# Patient Record
Sex: MALE | Race: WHITE | Employment: OTHER
[De-identification: names, ages, dates, MRNs, and addresses within clinical notes are randomized per-mention and may not be internally consistent; named-entity substitution may affect disease eponyms.]

---

## 2017-02-01 ENCOUNTER — OFFICE VISIT (OUTPATIENT)
Dept: PAIN MANAGEMENT | Facility: CLINIC | Age: 57
End: 2017-02-01

## 2017-02-01 VITALS
SYSTOLIC BLOOD PRESSURE: 122 MMHG | BODY MASS INDEX: 27.49 KG/M2 | RESPIRATION RATE: 16 BRPM | HEIGHT: 70 IN | WEIGHT: 192 LBS | OXYGEN SATURATION: 99 % | HEART RATE: 67 BPM | DIASTOLIC BLOOD PRESSURE: 72 MMHG

## 2017-02-01 DIAGNOSIS — M51.26 LUMBAR DISC HERNIATION: ICD-10-CM

## 2017-02-01 DIAGNOSIS — M54.16 LUMBAR RADICULITIS: Primary | Chronic | ICD-10-CM

## 2017-02-01 PROCEDURE — 4004F PT TOBACCO SCREEN RCVD TLK: CPT | Performed by: ANESTHESIOLOGY

## 2017-02-01 PROCEDURE — G8419 CALC BMI OUT NRM PARAM NOF/U: HCPCS | Performed by: ANESTHESIOLOGY

## 2017-02-01 PROCEDURE — 99213 OFFICE O/P EST LOW 20 MIN: CPT | Performed by: ANESTHESIOLOGY

## 2017-02-01 PROCEDURE — 3017F COLORECTAL CA SCREEN DOC REV: CPT | Performed by: ANESTHESIOLOGY

## 2017-02-01 PROCEDURE — G8484 FLU IMMUNIZE NO ADMIN: HCPCS | Performed by: ANESTHESIOLOGY

## 2017-02-01 PROCEDURE — G8427 DOCREV CUR MEDS BY ELIG CLIN: HCPCS | Performed by: ANESTHESIOLOGY

## 2017-02-01 ASSESSMENT — ENCOUNTER SYMPTOMS
BACK PAIN: 1
RESPIRATORY NEGATIVE: 1

## 2017-02-14 DIAGNOSIS — F17.210 CIGARETTE NICOTINE DEPENDENCE WITHOUT COMPLICATION: Primary | ICD-10-CM

## 2017-02-14 RX ORDER — NICOTINE 21 MG/24HR
1 PATCH, TRANSDERMAL 24 HOURS TRANSDERMAL EVERY 24 HOURS
Qty: 30 PATCH | Refills: 3 | Status: SHIPPED | OUTPATIENT
Start: 2017-02-14 | End: 2017-03-23 | Stop reason: DRUGHIGH

## 2017-02-17 ENCOUNTER — TELEPHONE (OUTPATIENT)
Dept: GASTROENTEROLOGY | Facility: CLINIC | Age: 57
End: 2017-02-17

## 2017-03-14 RX ORDER — ZOLPIDEM TARTRATE 5 MG/1
5 TABLET ORAL NIGHTLY PRN
Qty: 30 TABLET | Refills: 2 | OUTPATIENT
Start: 2017-03-14

## 2017-03-16 ENCOUNTER — HOSPITAL ENCOUNTER (OUTPATIENT)
Age: 57
Discharge: HOME | End: 2017-03-16
Payer: MEDICARE

## 2017-03-16 DIAGNOSIS — K74.60 HEPATIC CIRRHOSIS, UNSPECIFIED HEPATIC CIRRHOSIS TYPE (HCC): ICD-10-CM

## 2017-03-16 DIAGNOSIS — B18.2 HEP C W/O COMA, CHRONIC (HCC): ICD-10-CM

## 2017-03-16 LAB
ABSOLUTE EOS #: 0.2 K/UL (ref 0–0.4)
ABSOLUTE LYMPH #: 1.9 K/UL (ref 1–4.8)
ABSOLUTE MONO #: 1 K/UL (ref 0.1–1.3)
ALBUMIN SERPL-MCNC: 3.7 G/DL (ref 3.5–5.2)
ALBUMIN/GLOBULIN RATIO: ABNORMAL (ref 1–2.5)
ALP BLD-CCNC: 91 U/L (ref 40–129)
ALT SERPL-CCNC: 10 U/L (ref 5–41)
ANION GAP SERPL CALCULATED.3IONS-SCNC: 12 MMOL/L (ref 9–17)
AST SERPL-CCNC: 19 U/L
BASOPHILS # BLD: 1 % (ref 0–2)
BASOPHILS ABSOLUTE: 0.1 K/UL (ref 0–0.2)
BILIRUB SERPL-MCNC: 1.01 MG/DL (ref 0.3–1.2)
BUN BLDV-MCNC: 6 MG/DL (ref 6–20)
BUN/CREAT BLD: ABNORMAL (ref 9–20)
CALCIUM SERPL-MCNC: 9.1 MG/DL (ref 8.6–10.4)
CHLORIDE BLD-SCNC: 99 MMOL/L (ref 98–107)
CO2: 27 MMOL/L (ref 20–31)
CREAT SERPL-MCNC: 0.56 MG/DL (ref 0.7–1.2)
DIFFERENTIAL TYPE: ABNORMAL
EOSINOPHILS RELATIVE PERCENT: 2 % (ref 0–4)
GFR AFRICAN AMERICAN: >60 ML/MIN
GFR NON-AFRICAN AMERICAN: >60 ML/MIN
GFR SERPL CREATININE-BSD FRML MDRD: ABNORMAL ML/MIN/{1.73_M2}
GFR SERPL CREATININE-BSD FRML MDRD: ABNORMAL ML/MIN/{1.73_M2}
GLUCOSE BLD-MCNC: 102 MG/DL (ref 70–99)
HCT VFR BLD CALC: 39.8 % (ref 41–53)
HEMOGLOBIN: 12.9 G/DL (ref 13.5–17.5)
LYMPHOCYTES # BLD: 20 % (ref 24–44)
MCH RBC QN AUTO: 27.9 PG (ref 26–34)
MCHC RBC AUTO-ENTMCNC: 32.5 G/DL (ref 31–37)
MCV RBC AUTO: 86.1 FL (ref 80–100)
MONOCYTES # BLD: 10 % (ref 1–7)
PDW BLD-RTO: 14.7 % (ref 11.5–14.9)
PLATELET # BLD: 221 K/UL (ref 150–450)
PLATELET ESTIMATE: ABNORMAL
PMV BLD AUTO: 9 FL (ref 6–12)
POTASSIUM SERPL-SCNC: 4.1 MMOL/L (ref 3.7–5.3)
RBC # BLD: 4.63 M/UL (ref 4.5–5.9)
RBC # BLD: ABNORMAL 10*6/UL
SEG NEUTROPHILS: 67 % (ref 36–66)
SEGMENTED NEUTROPHILS ABSOLUTE COUNT: 6.6 K/UL (ref 1.3–9.1)
SODIUM BLD-SCNC: 138 MMOL/L (ref 135–144)
TOTAL PROTEIN: 7.4 G/DL (ref 6.4–8.3)
WBC # BLD: 9.8 K/UL (ref 3.5–11)
WBC # BLD: ABNORMAL 10*3/UL

## 2017-03-16 PROCEDURE — 80053 COMPREHEN METABOLIC PANEL: CPT

## 2017-03-16 PROCEDURE — 85025 COMPLETE CBC W/AUTO DIFF WBC: CPT

## 2017-03-16 PROCEDURE — 36415 COLL VENOUS BLD VENIPUNCTURE: CPT

## 2017-03-21 RX ORDER — ZOLPIDEM TARTRATE 5 MG/1
TABLET ORAL
Qty: 30 TABLET | Refills: 1 | Status: SHIPPED | OUTPATIENT
Start: 2017-03-21 | End: 2017-05-18 | Stop reason: SDUPTHER

## 2017-03-22 ENCOUNTER — TELEPHONE (OUTPATIENT)
Dept: FAMILY MEDICINE CLINIC | Age: 57
End: 2017-03-22

## 2017-03-23 ENCOUNTER — NURSE ONLY (OUTPATIENT)
Dept: GASTROENTEROLOGY | Age: 57
End: 2017-03-23

## 2017-03-23 ENCOUNTER — TELEPHONE (OUTPATIENT)
Dept: GASTROENTEROLOGY | Age: 57
End: 2017-03-23

## 2017-03-23 ENCOUNTER — HOSPITAL ENCOUNTER (OUTPATIENT)
Age: 57
Discharge: HOME | End: 2017-03-23
Payer: MEDICARE

## 2017-03-23 ENCOUNTER — OFFICE VISIT (OUTPATIENT)
Dept: FAMILY MEDICINE CLINIC | Age: 57
End: 2017-03-23
Payer: MEDICARE

## 2017-03-23 VITALS
WEIGHT: 205 LBS | SYSTOLIC BLOOD PRESSURE: 138 MMHG | TEMPERATURE: 97.6 F | DIASTOLIC BLOOD PRESSURE: 82 MMHG | HEART RATE: 77 BPM | BODY MASS INDEX: 29.41 KG/M2 | OXYGEN SATURATION: 98 %

## 2017-03-23 VITALS
HEIGHT: 70 IN | SYSTOLIC BLOOD PRESSURE: 139 MMHG | BODY MASS INDEX: 29.45 KG/M2 | TEMPERATURE: 97.3 F | OXYGEN SATURATION: 97 % | WEIGHT: 205.7 LBS | HEART RATE: 77 BPM | DIASTOLIC BLOOD PRESSURE: 84 MMHG

## 2017-03-23 DIAGNOSIS — Z00.00 HEALTH CARE MAINTENANCE: ICD-10-CM

## 2017-03-23 DIAGNOSIS — R68.89 HEAD PROBLEM: Primary | ICD-10-CM

## 2017-03-23 DIAGNOSIS — N39.43 URINARY DRIBBLING: ICD-10-CM

## 2017-03-23 DIAGNOSIS — N52.9 ERECTILE DYSFUNCTION, UNSPECIFIED ERECTILE DYSFUNCTION TYPE: ICD-10-CM

## 2017-03-23 DIAGNOSIS — R09.89 BRUIT OF LEFT CAROTID ARTERY: ICD-10-CM

## 2017-03-23 DIAGNOSIS — Z12.5 SCREENING FOR PROSTATE CANCER: ICD-10-CM

## 2017-03-23 DIAGNOSIS — H93.13 TINNITUS, BILATERAL: Primary | ICD-10-CM

## 2017-03-23 DIAGNOSIS — B18.2 CHRONIC HEPATITIS C WITHOUT HEPATIC COMA (HCC): ICD-10-CM

## 2017-03-23 DIAGNOSIS — H69.83 EUSTACHIAN TUBE DYSFUNCTION, BILATERAL: ICD-10-CM

## 2017-03-23 PROBLEM — H69.90 EUSTACHIAN TUBE DYSFUNCTION: Status: ACTIVE | Noted: 2017-03-23

## 2017-03-23 PROBLEM — H93.19 TINNITUS: Status: ACTIVE | Noted: 2017-03-23

## 2017-03-23 PROBLEM — H69.80 EUSTACHIAN TUBE DYSFUNCTION: Status: ACTIVE | Noted: 2017-03-23

## 2017-03-23 LAB
CHOLESTEROL/HDL RATIO: 3.1
CHOLESTEROL: 168 MG/DL
HDLC SERPL-MCNC: 55 MG/DL
LDL CHOLESTEROL: 87 MG/DL (ref 0–130)
PROSTATE SPECIFIC ANTIGEN: 0.44 UG/L
TRIGL SERPL-MCNC: 131 MG/DL
VLDLC SERPL CALC-MCNC: NORMAL MG/DL (ref 1–30)

## 2017-03-23 PROCEDURE — 36415 COLL VENOUS BLD VENIPUNCTURE: CPT

## 2017-03-23 PROCEDURE — G8484 FLU IMMUNIZE NO ADMIN: HCPCS | Performed by: NURSE PRACTITIONER

## 2017-03-23 PROCEDURE — 99213 OFFICE O/P EST LOW 20 MIN: CPT | Performed by: INTERNAL MEDICINE

## 2017-03-23 PROCEDURE — G8427 DOCREV CUR MEDS BY ELIG CLIN: HCPCS | Performed by: NURSE PRACTITIONER

## 2017-03-23 PROCEDURE — 3017F COLORECTAL CA SCREEN DOC REV: CPT | Performed by: NURSE PRACTITIONER

## 2017-03-23 PROCEDURE — 80061 LIPID PANEL: CPT

## 2017-03-23 PROCEDURE — 99214 OFFICE O/P EST MOD 30 MIN: CPT | Performed by: NURSE PRACTITIONER

## 2017-03-23 PROCEDURE — G0103 PSA SCREENING: HCPCS

## 2017-03-23 PROCEDURE — 1036F TOBACCO NON-USER: CPT | Performed by: NURSE PRACTITIONER

## 2017-03-23 PROCEDURE — 84153 ASSAY OF PSA TOTAL: CPT

## 2017-03-23 PROCEDURE — G8419 CALC BMI OUT NRM PARAM NOF/U: HCPCS | Performed by: NURSE PRACTITIONER

## 2017-03-23 RX ORDER — FUROSEMIDE 40 MG/1
40 TABLET ORAL DAILY
Qty: 60 TABLET | Refills: 5 | Status: SHIPPED | OUTPATIENT
Start: 2017-03-23 | End: 2017-09-13 | Stop reason: SDUPTHER

## 2017-03-23 RX ORDER — NICOTINE 7 MG/24H
1 PATCH, EXTENDED RELEASE TRANSDERMAL DAILY
Refills: 0 | COMMUNITY
Start: 2017-03-20 | End: 2017-03-23

## 2017-03-23 RX ORDER — FLUTICASONE PROPIONATE 50 MCG
1 SPRAY, SUSPENSION (ML) NASAL DAILY
Qty: 1 BOTTLE | Refills: 3 | Status: SHIPPED | OUTPATIENT
Start: 2017-03-23 | End: 2017-05-05 | Stop reason: ALTCHOICE

## 2017-03-23 ASSESSMENT — ENCOUNTER SYMPTOMS
VOICE CHANGE: 0
COUGH: 0
SHORTNESS OF BREATH: 0
DIARRHEA: 0
GASTROINTESTINAL NEGATIVE: 1
RESPIRATORY NEGATIVE: 1
BACK PAIN: 1
CONSTIPATION: 0
DIARRHEA: 0
BLOOD IN STOOL: 0
SHORTNESS OF BREATH: 0
ABDOMINAL PAIN: 0
WHEEZING: 0
TROUBLE SWALLOWING: 0
RESPIRATORY NEGATIVE: 1
CHOKING: 0
COUGH: 0
ABDOMINAL DISTENTION: 0
ABDOMINAL PAIN: 0
SINUS PRESSURE: 0
NAUSEA: 0
WHEEZING: 0
ALLERGIC/IMMUNOLOGIC NEGATIVE: 1
CONSTIPATION: 0
EYES NEGATIVE: 1
RECTAL PAIN: 0
VOMITING: 0
SORE THROAT: 0
FACIAL SWELLING: 0
ALLERGIC/IMMUNOLOGIC NEGATIVE: 1
RHINORRHEA: 0
ANAL BLEEDING: 0

## 2017-03-29 ENCOUNTER — NURSE ONLY (OUTPATIENT)
Dept: GASTROENTEROLOGY | Age: 57
End: 2017-03-29
Payer: MEDICARE

## 2017-03-29 VITALS
WEIGHT: 205.6 LBS | HEART RATE: 74 BPM | HEIGHT: 70 IN | DIASTOLIC BLOOD PRESSURE: 80 MMHG | BODY MASS INDEX: 29.43 KG/M2 | OXYGEN SATURATION: 98 % | TEMPERATURE: 97.1 F | SYSTOLIC BLOOD PRESSURE: 130 MMHG

## 2017-03-29 DIAGNOSIS — Z23 NEED FOR IMMUNIZATION AGAINST VIRAL HEPATITIS: Primary | ICD-10-CM

## 2017-03-29 PROCEDURE — G0010 ADMIN HEPATITIS B VACCINE: HCPCS | Performed by: INTERNAL MEDICINE

## 2017-03-29 PROCEDURE — 90632 HEPA VACCINE ADULT IM: CPT | Performed by: INTERNAL MEDICINE

## 2017-03-29 PROCEDURE — 90746 HEPB VACCINE 3 DOSE ADULT IM: CPT | Performed by: INTERNAL MEDICINE

## 2017-04-03 ENCOUNTER — HOSPITAL ENCOUNTER (OUTPATIENT)
Dept: VASCULAR LAB | Age: 57
Discharge: HOME | End: 2017-04-03
Payer: MEDICARE

## 2017-04-03 DIAGNOSIS — R09.89 BRUIT OF LEFT CAROTID ARTERY: ICD-10-CM

## 2017-04-03 DIAGNOSIS — H93.13 TINNITUS, BILATERAL: ICD-10-CM

## 2017-04-03 PROCEDURE — 93880 EXTRACRANIAL BILAT STUDY: CPT

## 2017-04-06 ENCOUNTER — OFFICE VISIT (OUTPATIENT)
Dept: FAMILY MEDICINE CLINIC | Age: 57
End: 2017-04-06

## 2017-04-06 VITALS
OXYGEN SATURATION: 99 % | SYSTOLIC BLOOD PRESSURE: 118 MMHG | DIASTOLIC BLOOD PRESSURE: 64 MMHG | WEIGHT: 204 LBS | HEART RATE: 70 BPM | BODY MASS INDEX: 29.27 KG/M2

## 2017-04-06 DIAGNOSIS — N62 GYNECOMASTIA, MALE: ICD-10-CM

## 2017-04-06 DIAGNOSIS — Z23 NEED FOR TDAP VACCINATION: ICD-10-CM

## 2017-04-06 DIAGNOSIS — H93.13 TINNITUS, BILATERAL: Primary | ICD-10-CM

## 2017-04-06 PROCEDURE — 3017F COLORECTAL CA SCREEN DOC REV: CPT | Performed by: NURSE PRACTITIONER

## 2017-04-06 PROCEDURE — 1036F TOBACCO NON-USER: CPT | Performed by: NURSE PRACTITIONER

## 2017-04-06 PROCEDURE — 99214 OFFICE O/P EST MOD 30 MIN: CPT | Performed by: NURSE PRACTITIONER

## 2017-04-06 PROCEDURE — G8427 DOCREV CUR MEDS BY ELIG CLIN: HCPCS | Performed by: NURSE PRACTITIONER

## 2017-04-06 PROCEDURE — 90715 TDAP VACCINE 7 YRS/> IM: CPT | Performed by: NURSE PRACTITIONER

## 2017-04-06 PROCEDURE — 90471 IMMUNIZATION ADMIN: CPT | Performed by: NURSE PRACTITIONER

## 2017-04-06 PROCEDURE — G8419 CALC BMI OUT NRM PARAM NOF/U: HCPCS | Performed by: NURSE PRACTITIONER

## 2017-04-06 ASSESSMENT — ENCOUNTER SYMPTOMS
WHEEZING: 0
GASTROINTESTINAL NEGATIVE: 1
COUGH: 0
EYES NEGATIVE: 1
RESPIRATORY NEGATIVE: 1
ABDOMINAL PAIN: 0
DIARRHEA: 0
ALLERGIC/IMMUNOLOGIC NEGATIVE: 1
SHORTNESS OF BREATH: 0
CONSTIPATION: 0

## 2017-04-12 ENCOUNTER — TELEPHONE (OUTPATIENT)
Dept: FAMILY MEDICINE CLINIC | Age: 57
End: 2017-04-12

## 2017-04-12 DIAGNOSIS — N63.0 BREAST MASS IN MALE: Primary | ICD-10-CM

## 2017-04-17 DIAGNOSIS — N63.0 BREAST MASS IN MALE: ICD-10-CM

## 2017-04-17 DIAGNOSIS — N63.0 BREAST MASS IN MALE: Primary | ICD-10-CM

## 2017-04-24 ENCOUNTER — OFFICE VISIT (OUTPATIENT)
Dept: GASTROENTEROLOGY | Age: 57
End: 2017-04-24
Payer: MEDICARE

## 2017-04-24 VITALS
TEMPERATURE: 97.4 F | OXYGEN SATURATION: 97 % | BODY MASS INDEX: 29.06 KG/M2 | HEART RATE: 87 BPM | HEIGHT: 70 IN | DIASTOLIC BLOOD PRESSURE: 80 MMHG | WEIGHT: 203 LBS | SYSTOLIC BLOOD PRESSURE: 140 MMHG

## 2017-04-24 DIAGNOSIS — K74.60 HEPATIC CIRRHOSIS, UNSPECIFIED HEPATIC CIRRHOSIS TYPE (HCC): ICD-10-CM

## 2017-04-24 DIAGNOSIS — K76.0 FATTY LIVER: ICD-10-CM

## 2017-04-24 DIAGNOSIS — B18.2 HEP C W/O COMA, CHRONIC (HCC): ICD-10-CM

## 2017-04-24 DIAGNOSIS — Z86.010 HISTORY OF COLON POLYPS: Primary | ICD-10-CM

## 2017-04-24 PROCEDURE — 1036F TOBACCO NON-USER: CPT | Performed by: INTERNAL MEDICINE

## 2017-04-24 PROCEDURE — 3017F COLORECTAL CA SCREEN DOC REV: CPT | Performed by: INTERNAL MEDICINE

## 2017-04-24 PROCEDURE — G8427 DOCREV CUR MEDS BY ELIG CLIN: HCPCS | Performed by: INTERNAL MEDICINE

## 2017-04-24 PROCEDURE — G8419 CALC BMI OUT NRM PARAM NOF/U: HCPCS | Performed by: INTERNAL MEDICINE

## 2017-04-24 PROCEDURE — 99213 OFFICE O/P EST LOW 20 MIN: CPT | Performed by: INTERNAL MEDICINE

## 2017-04-24 ASSESSMENT — ENCOUNTER SYMPTOMS
BLOOD IN STOOL: 0
TROUBLE SWALLOWING: 0
VOICE CHANGE: 0
ANAL BLEEDING: 0
WHEEZING: 0
RESPIRATORY NEGATIVE: 1
CHOKING: 0
NAUSEA: 0
RECTAL PAIN: 0
VOMITING: 0
COUGH: 0
ALLERGIC/IMMUNOLOGIC NEGATIVE: 1
CONSTIPATION: 0
GASTROINTESTINAL NEGATIVE: 1
ABDOMINAL PAIN: 0
BACK PAIN: 1
ABDOMINAL DISTENTION: 0
SHORTNESS OF BREATH: 0
RHINORRHEA: 0
SINUS PRESSURE: 0
SORE THROAT: 0
FACIAL SWELLING: 0
DIARRHEA: 0

## 2017-04-25 ENCOUNTER — HOSPITAL ENCOUNTER (OUTPATIENT)
Dept: WOMENS IMAGING | Age: 57
Discharge: HOME | End: 2017-04-25
Payer: MEDICARE

## 2017-04-25 DIAGNOSIS — N63.0 BREAST MASS IN MALE: ICD-10-CM

## 2017-04-25 PROCEDURE — 76642 ULTRASOUND BREAST LIMITED: CPT

## 2017-04-25 PROCEDURE — G0279 TOMOSYNTHESIS, MAMMO: HCPCS

## 2017-04-26 ENCOUNTER — OFFICE VISIT (OUTPATIENT)
Dept: PAIN MANAGEMENT | Age: 57
End: 2017-04-26
Payer: MEDICARE

## 2017-04-26 VITALS
DIASTOLIC BLOOD PRESSURE: 81 MMHG | HEART RATE: 77 BPM | OXYGEN SATURATION: 97 % | RESPIRATION RATE: 16 BRPM | WEIGHT: 208.2 LBS | SYSTOLIC BLOOD PRESSURE: 131 MMHG | BODY MASS INDEX: 29.81 KG/M2 | HEIGHT: 70 IN

## 2017-04-26 DIAGNOSIS — M51.26 LUMBAR DISC HERNIATION: Primary | ICD-10-CM

## 2017-04-26 DIAGNOSIS — M79.641 RIGHT HAND PAIN: ICD-10-CM

## 2017-04-26 PROCEDURE — 1036F TOBACCO NON-USER: CPT | Performed by: ANESTHESIOLOGY

## 2017-04-26 PROCEDURE — G8419 CALC BMI OUT NRM PARAM NOF/U: HCPCS | Performed by: ANESTHESIOLOGY

## 2017-04-26 PROCEDURE — 99213 OFFICE O/P EST LOW 20 MIN: CPT | Performed by: ANESTHESIOLOGY

## 2017-04-26 PROCEDURE — G8427 DOCREV CUR MEDS BY ELIG CLIN: HCPCS | Performed by: ANESTHESIOLOGY

## 2017-04-26 PROCEDURE — 3017F COLORECTAL CA SCREEN DOC REV: CPT | Performed by: ANESTHESIOLOGY

## 2017-04-26 ASSESSMENT — ENCOUNTER SYMPTOMS: BACK PAIN: 1

## 2017-05-05 ENCOUNTER — OFFICE VISIT (OUTPATIENT)
Dept: UROLOGY | Age: 57
End: 2017-05-05
Payer: MEDICARE

## 2017-05-05 VITALS
TEMPERATURE: 98 F | DIASTOLIC BLOOD PRESSURE: 84 MMHG | SYSTOLIC BLOOD PRESSURE: 145 MMHG | HEART RATE: 83 BPM | BODY MASS INDEX: 30.06 KG/M2 | HEIGHT: 70 IN | WEIGHT: 210 LBS

## 2017-05-05 DIAGNOSIS — R35.1 NOCTURIA: ICD-10-CM

## 2017-05-05 DIAGNOSIS — R35.0 URINARY FREQUENCY: Primary | ICD-10-CM

## 2017-05-05 LAB
BILIRUBIN, POC: ABNORMAL
BLOOD URINE, POC: ABNORMAL
CLARITY, POC: CLEAR
COLOR, POC: YELLOW
GLUCOSE URINE, POC: ABNORMAL
KETONES, POC: ABNORMAL
LEUKOCYTE EST, POC: ABNORMAL
NITRITE, POC: ABNORMAL
PH, POC: ABNORMAL
PROTEIN, POC: ABNORMAL
SPECIFIC GRAVITY, POC: ABNORMAL
UROBILINOGEN, POC: ABNORMAL

## 2017-05-05 PROCEDURE — 81003 URINALYSIS AUTO W/O SCOPE: CPT | Performed by: UROLOGY

## 2017-05-05 PROCEDURE — 3017F COLORECTAL CA SCREEN DOC REV: CPT | Performed by: UROLOGY

## 2017-05-05 PROCEDURE — 99204 OFFICE O/P NEW MOD 45 MIN: CPT | Performed by: UROLOGY

## 2017-05-05 PROCEDURE — 1036F TOBACCO NON-USER: CPT | Performed by: UROLOGY

## 2017-05-05 PROCEDURE — 51798 US URINE CAPACITY MEASURE: CPT | Performed by: UROLOGY

## 2017-05-05 PROCEDURE — G8427 DOCREV CUR MEDS BY ELIG CLIN: HCPCS | Performed by: UROLOGY

## 2017-05-05 PROCEDURE — G8419 CALC BMI OUT NRM PARAM NOF/U: HCPCS | Performed by: UROLOGY

## 2017-05-05 RX ORDER — TAMSULOSIN HYDROCHLORIDE 0.4 MG/1
0.4 CAPSULE ORAL DAILY
Qty: 30 CAPSULE | Refills: 11 | Status: SHIPPED | OUTPATIENT
Start: 2017-05-05 | End: 2018-03-12 | Stop reason: ALTCHOICE

## 2017-05-05 ASSESSMENT — ENCOUNTER SYMPTOMS
SHORTNESS OF BREATH: 0
ABDOMINAL PAIN: 0
BACK PAIN: 1
VOMITING: 0
EYE PAIN: 0
WHEEZING: 0
COLOR CHANGE: 0
EYE REDNESS: 0
COUGH: 0
NAUSEA: 0

## 2017-05-18 RX ORDER — ZOLPIDEM TARTRATE 5 MG/1
TABLET ORAL
Qty: 30 TABLET | Refills: 1 | Status: SHIPPED | OUTPATIENT
Start: 2017-05-18 | End: 2017-07-12 | Stop reason: SDUPTHER

## 2017-05-31 ENCOUNTER — OFFICE VISIT (OUTPATIENT)
Dept: FAMILY MEDICINE CLINIC | Age: 57
End: 2017-05-31

## 2017-05-31 VITALS
BODY MASS INDEX: 30.06 KG/M2 | SYSTOLIC BLOOD PRESSURE: 136 MMHG | HEIGHT: 70 IN | HEART RATE: 75 BPM | WEIGHT: 210 LBS | DIASTOLIC BLOOD PRESSURE: 80 MMHG | RESPIRATION RATE: 18 BRPM | TEMPERATURE: 96.7 F

## 2017-05-31 DIAGNOSIS — G56.01 CARPAL TUNNEL SYNDROME OF RIGHT WRIST: Primary | ICD-10-CM

## 2017-05-31 DIAGNOSIS — M67.40 GANGLION CYST: ICD-10-CM

## 2017-05-31 DIAGNOSIS — N62 GYNECOMASTIA, MALE: ICD-10-CM

## 2017-05-31 PROCEDURE — 3017F COLORECTAL CA SCREEN DOC REV: CPT | Performed by: NURSE PRACTITIONER

## 2017-05-31 PROCEDURE — G8417 CALC BMI ABV UP PARAM F/U: HCPCS | Performed by: NURSE PRACTITIONER

## 2017-05-31 PROCEDURE — G8427 DOCREV CUR MEDS BY ELIG CLIN: HCPCS | Performed by: NURSE PRACTITIONER

## 2017-05-31 PROCEDURE — 99214 OFFICE O/P EST MOD 30 MIN: CPT | Performed by: NURSE PRACTITIONER

## 2017-05-31 PROCEDURE — 1036F TOBACCO NON-USER: CPT | Performed by: NURSE PRACTITIONER

## 2017-05-31 ASSESSMENT — PATIENT HEALTH QUESTIONNAIRE - PHQ9
1. LITTLE INTEREST OR PLEASURE IN DOING THINGS: 0
SUM OF ALL RESPONSES TO PHQ9 QUESTIONS 1 & 2: 0
SUM OF ALL RESPONSES TO PHQ QUESTIONS 1-9: 0
2. FEELING DOWN, DEPRESSED OR HOPELESS: 0

## 2017-05-31 ASSESSMENT — ENCOUNTER SYMPTOMS
ALLERGIC/IMMUNOLOGIC NEGATIVE: 1
SHORTNESS OF BREATH: 0
CONSTIPATION: 0
RESPIRATORY NEGATIVE: 1
ABDOMINAL PAIN: 0
GASTROINTESTINAL NEGATIVE: 1
WHEEZING: 0
EYES NEGATIVE: 1
COUGH: 0
DIARRHEA: 0

## 2017-06-29 ENCOUNTER — OFFICE VISIT (OUTPATIENT)
Dept: ORTHOPEDIC SURGERY | Age: 57
End: 2017-06-29

## 2017-06-29 VITALS — BODY MASS INDEX: 30.06 KG/M2 | HEIGHT: 70 IN | WEIGHT: 210 LBS

## 2017-06-29 DIAGNOSIS — G56.01 CARPAL TUNNEL SYNDROME OF RIGHT WRIST: Primary | ICD-10-CM

## 2017-06-29 PROCEDURE — G8417 CALC BMI ABV UP PARAM F/U: HCPCS | Performed by: ORTHOPAEDIC SURGERY

## 2017-06-29 PROCEDURE — G8427 DOCREV CUR MEDS BY ELIG CLIN: HCPCS | Performed by: ORTHOPAEDIC SURGERY

## 2017-06-29 PROCEDURE — 1036F TOBACCO NON-USER: CPT | Performed by: ORTHOPAEDIC SURGERY

## 2017-06-29 PROCEDURE — 99213 OFFICE O/P EST LOW 20 MIN: CPT | Performed by: ORTHOPAEDIC SURGERY

## 2017-06-29 PROCEDURE — 3017F COLORECTAL CA SCREEN DOC REV: CPT | Performed by: ORTHOPAEDIC SURGERY

## 2017-07-03 ENCOUNTER — OFFICE VISIT (OUTPATIENT)
Dept: GASTROENTEROLOGY | Age: 57
End: 2017-07-03

## 2017-07-03 VITALS
HEART RATE: 69 BPM | HEIGHT: 70 IN | SYSTOLIC BLOOD PRESSURE: 141 MMHG | DIASTOLIC BLOOD PRESSURE: 87 MMHG | OXYGEN SATURATION: 98 % | BODY MASS INDEX: 30.86 KG/M2 | TEMPERATURE: 98.1 F | RESPIRATION RATE: 14 BRPM | WEIGHT: 215.6 LBS

## 2017-07-03 DIAGNOSIS — B18.2 HEP C W/O COMA, CHRONIC (HCC): Primary | ICD-10-CM

## 2017-07-03 DIAGNOSIS — K74.60 HEPATIC CIRRHOSIS, UNSPECIFIED HEPATIC CIRRHOSIS TYPE (HCC): ICD-10-CM

## 2017-07-03 PROCEDURE — G8417 CALC BMI ABV UP PARAM F/U: HCPCS | Performed by: INTERNAL MEDICINE

## 2017-07-03 PROCEDURE — 99214 OFFICE O/P EST MOD 30 MIN: CPT | Performed by: INTERNAL MEDICINE

## 2017-07-03 PROCEDURE — 3017F COLORECTAL CA SCREEN DOC REV: CPT | Performed by: INTERNAL MEDICINE

## 2017-07-03 PROCEDURE — G8427 DOCREV CUR MEDS BY ELIG CLIN: HCPCS | Performed by: INTERNAL MEDICINE

## 2017-07-03 PROCEDURE — 1036F TOBACCO NON-USER: CPT | Performed by: INTERNAL MEDICINE

## 2017-07-03 ASSESSMENT — ENCOUNTER SYMPTOMS
SHORTNESS OF BREATH: 0
ALLERGIC/IMMUNOLOGIC NEGATIVE: 1
TROUBLE SWALLOWING: 0
VOMITING: 0
FACIAL SWELLING: 0
SINUS PRESSURE: 0
CONSTIPATION: 0
ABDOMINAL PAIN: 0
RESPIRATORY NEGATIVE: 1
BLOOD IN STOOL: 0
VOICE CHANGE: 0
ANAL BLEEDING: 0
NAUSEA: 0
RECTAL PAIN: 0
COUGH: 0
WHEEZING: 0
GASTROINTESTINAL NEGATIVE: 1
CHOKING: 0
BACK PAIN: 1
SORE THROAT: 0
DIARRHEA: 0
ABDOMINAL DISTENTION: 0
RHINORRHEA: 0

## 2017-07-13 RX ORDER — OMEPRAZOLE 20 MG/1
20 CAPSULE, DELAYED RELEASE ORAL DAILY
Qty: 30 CAPSULE | Refills: 5 | Status: SHIPPED | OUTPATIENT
Start: 2017-07-13 | End: 2017-09-13 | Stop reason: ALTCHOICE

## 2017-07-13 RX ORDER — ZOLPIDEM TARTRATE 5 MG/1
TABLET ORAL
Qty: 30 TABLET | Refills: 1 | Status: SHIPPED | OUTPATIENT
Start: 2017-07-13 | End: 2017-09-13 | Stop reason: SDUPTHER

## 2017-08-01 ENCOUNTER — OFFICE VISIT (OUTPATIENT)
Dept: UROLOGY | Age: 57
End: 2017-08-01

## 2017-08-01 VITALS
HEART RATE: 91 BPM | WEIGHT: 224.2 LBS | HEIGHT: 70 IN | BODY MASS INDEX: 32.1 KG/M2 | SYSTOLIC BLOOD PRESSURE: 128 MMHG | TEMPERATURE: 97.8 F | DIASTOLIC BLOOD PRESSURE: 76 MMHG

## 2017-08-01 DIAGNOSIS — N52.03 COMBINED ARTERIAL INSUFFICIENCY AND CORPORO-VENOUS OCCLUSIVE ERECTILE DYSFUNCTION: Primary | ICD-10-CM

## 2017-08-01 DIAGNOSIS — R35.1 NOCTURIA: ICD-10-CM

## 2017-08-01 DIAGNOSIS — N13.8 BPH WITH OBSTRUCTION/LOWER URINARY TRACT SYMPTOMS: ICD-10-CM

## 2017-08-01 DIAGNOSIS — N40.1 BPH WITH OBSTRUCTION/LOWER URINARY TRACT SYMPTOMS: ICD-10-CM

## 2017-08-01 PROCEDURE — G8417 CALC BMI ABV UP PARAM F/U: HCPCS | Performed by: UROLOGY

## 2017-08-01 PROCEDURE — 3017F COLORECTAL CA SCREEN DOC REV: CPT | Performed by: UROLOGY

## 2017-08-01 PROCEDURE — 1036F TOBACCO NON-USER: CPT | Performed by: UROLOGY

## 2017-08-01 PROCEDURE — G8427 DOCREV CUR MEDS BY ELIG CLIN: HCPCS | Performed by: UROLOGY

## 2017-08-01 PROCEDURE — 99214 OFFICE O/P EST MOD 30 MIN: CPT | Performed by: UROLOGY

## 2017-08-01 ASSESSMENT — ENCOUNTER SYMPTOMS
EYE PAIN: 0
COUGH: 0
SHORTNESS OF BREATH: 0
ABDOMINAL PAIN: 0
BACK PAIN: 1
EYE REDNESS: 0
WHEEZING: 0
NAUSEA: 0
COLOR CHANGE: 0
VOMITING: 0

## 2017-08-10 ENCOUNTER — OFFICE VISIT (OUTPATIENT)
Dept: ORTHOPEDIC SURGERY | Age: 57
End: 2017-08-10

## 2017-08-10 VITALS — HEIGHT: 70 IN | RESPIRATION RATE: 18 BRPM | HEART RATE: 64 BPM | WEIGHT: 222 LBS | BODY MASS INDEX: 31.78 KG/M2

## 2017-08-10 DIAGNOSIS — G56.03 CARPAL TUNNEL SYNDROME, BILATERAL: Primary | ICD-10-CM

## 2017-08-10 PROCEDURE — 3017F COLORECTAL CA SCREEN DOC REV: CPT | Performed by: ORTHOPAEDIC SURGERY

## 2017-08-10 PROCEDURE — 1036F TOBACCO NON-USER: CPT | Performed by: ORTHOPAEDIC SURGERY

## 2017-08-10 PROCEDURE — G8417 CALC BMI ABV UP PARAM F/U: HCPCS | Performed by: ORTHOPAEDIC SURGERY

## 2017-08-10 PROCEDURE — 99213 OFFICE O/P EST LOW 20 MIN: CPT | Performed by: ORTHOPAEDIC SURGERY

## 2017-08-10 PROCEDURE — G8427 DOCREV CUR MEDS BY ELIG CLIN: HCPCS | Performed by: ORTHOPAEDIC SURGERY

## 2017-08-17 ENCOUNTER — HOSPITAL ENCOUNTER (OUTPATIENT)
Dept: PREADMISSION TESTING | Age: 57
Discharge: HOME | End: 2017-08-17
Payer: MEDICARE

## 2017-08-17 ENCOUNTER — HOSPITAL ENCOUNTER (OUTPATIENT)
Age: 57
Discharge: HOME | End: 2017-08-17
Payer: MEDICARE

## 2017-08-17 VITALS
TEMPERATURE: 98.1 F | HEIGHT: 70 IN | OXYGEN SATURATION: 98 % | RESPIRATION RATE: 20 BRPM | DIASTOLIC BLOOD PRESSURE: 82 MMHG | BODY MASS INDEX: 31.5 KG/M2 | WEIGHT: 220 LBS | SYSTOLIC BLOOD PRESSURE: 149 MMHG | HEART RATE: 79 BPM

## 2017-08-17 DIAGNOSIS — B18.2 HEP C W/O COMA, CHRONIC (HCC): ICD-10-CM

## 2017-08-17 LAB
ABSOLUTE EOS #: 0.2 K/UL (ref 0–0.4)
ABSOLUTE EOS #: 0.2 K/UL (ref 0–0.4)
ABSOLUTE LYMPH #: 2.1 K/UL (ref 1–4.8)
ABSOLUTE LYMPH #: 2.2 K/UL (ref 1–4.8)
ABSOLUTE MONO #: 0.7 K/UL (ref 0.1–1.3)
ABSOLUTE MONO #: 0.8 K/UL (ref 0.1–1.3)
ALBUMIN SERPL-MCNC: 4.1 G/DL (ref 3.5–5.2)
ALBUMIN/GLOBULIN RATIO: ABNORMAL (ref 1–2.5)
ALP BLD-CCNC: 140 U/L (ref 40–129)
ALT SERPL-CCNC: 18 U/L (ref 5–41)
ANION GAP SERPL CALCULATED.3IONS-SCNC: 14 MMOL/L (ref 9–17)
AST SERPL-CCNC: 28 U/L
BASOPHILS # BLD: 1 %
BASOPHILS # BLD: 1 %
BASOPHILS ABSOLUTE: 0.1 K/UL (ref 0–0.2)
BASOPHILS ABSOLUTE: 0.1 K/UL (ref 0–0.2)
BILIRUB SERPL-MCNC: 0.91 MG/DL (ref 0.3–1.2)
BILIRUBIN DIRECT: 0.21 MG/DL
BILIRUBIN, INDIRECT: 0.7 MG/DL (ref 0–1)
BUN BLDV-MCNC: 9 MG/DL (ref 6–20)
BUN/CREAT BLD: NORMAL (ref 9–20)
CALCIUM SERPL-MCNC: 9.2 MG/DL (ref 8.6–10.4)
CHLORIDE BLD-SCNC: 98 MMOL/L (ref 98–107)
CO2: 26 MMOL/L (ref 20–31)
CREAT SERPL-MCNC: 0.71 MG/DL (ref 0.7–1.2)
DIFFERENTIAL TYPE: NORMAL
DIFFERENTIAL TYPE: NORMAL
EOSINOPHILS RELATIVE PERCENT: 2 %
EOSINOPHILS RELATIVE PERCENT: 2 %
GFR AFRICAN AMERICAN: >60 ML/MIN
GFR NON-AFRICAN AMERICAN: >60 ML/MIN
GFR SERPL CREATININE-BSD FRML MDRD: NORMAL ML/MIN/{1.73_M2}
GFR SERPL CREATININE-BSD FRML MDRD: NORMAL ML/MIN/{1.73_M2}
GLOBULIN: ABNORMAL G/DL (ref 1.5–3.8)
GLUCOSE BLD-MCNC: 87 MG/DL (ref 70–99)
HCT VFR BLD CALC: 42.7 % (ref 41–53)
HCT VFR BLD CALC: 43.8 % (ref 41–53)
HEMOGLOBIN: 14.1 G/DL (ref 13.5–17.5)
HEMOGLOBIN: 14.5 G/DL (ref 13.5–17.5)
LYMPHOCYTES # BLD: 25 %
LYMPHOCYTES # BLD: 26 %
MCH RBC QN AUTO: 29 PG (ref 26–34)
MCH RBC QN AUTO: 29.1 PG (ref 26–34)
MCHC RBC AUTO-ENTMCNC: 33.1 G/DL (ref 31–37)
MCHC RBC AUTO-ENTMCNC: 33.1 G/DL (ref 31–37)
MCV RBC AUTO: 87.7 FL (ref 80–100)
MCV RBC AUTO: 87.9 FL (ref 80–100)
MONOCYTES # BLD: 9 %
MONOCYTES # BLD: 9 %
PDW BLD-RTO: 13.3 % (ref 11.5–14.9)
PDW BLD-RTO: 13.3 % (ref 11.5–14.9)
PLATELET # BLD: 188 K/UL (ref 150–450)
PLATELET # BLD: 199 K/UL (ref 150–450)
PLATELET ESTIMATE: NORMAL
PLATELET ESTIMATE: NORMAL
PMV BLD AUTO: 10 FL (ref 6–12)
PMV BLD AUTO: 10.2 FL (ref 6–12)
POTASSIUM SERPL-SCNC: 3.9 MMOL/L (ref 3.7–5.3)
RBC # BLD: 4.85 M/UL (ref 4.5–5.9)
RBC # BLD: 5 M/UL (ref 4.5–5.9)
RBC # BLD: NORMAL 10*6/UL
RBC # BLD: NORMAL 10*6/UL
SEG NEUTROPHILS: 62 %
SEG NEUTROPHILS: 63 %
SEGMENTED NEUTROPHILS ABSOLUTE COUNT: 5.1 K/UL (ref 1.3–9.1)
SEGMENTED NEUTROPHILS ABSOLUTE COUNT: 5.3 K/UL (ref 1.3–9.1)
SODIUM BLD-SCNC: 138 MMOL/L (ref 135–144)
TOTAL PROTEIN: 7.6 G/DL (ref 6.4–8.3)
WBC # BLD: 8.2 K/UL (ref 3.5–11)
WBC # BLD: 8.4 K/UL (ref 3.5–11)
WBC # BLD: NORMAL 10*3/UL
WBC # BLD: NORMAL 10*3/UL

## 2017-08-17 PROCEDURE — 80048 BASIC METABOLIC PNL TOTAL CA: CPT

## 2017-08-17 PROCEDURE — 85025 COMPLETE CBC W/AUTO DIFF WBC: CPT

## 2017-08-17 PROCEDURE — 36415 COLL VENOUS BLD VENIPUNCTURE: CPT

## 2017-08-17 PROCEDURE — 93005 ELECTROCARDIOGRAM TRACING: CPT

## 2017-08-17 PROCEDURE — 87522 HEPATITIS C REVRS TRNSCRPJ: CPT

## 2017-08-17 PROCEDURE — 80076 HEPATIC FUNCTION PANEL: CPT

## 2017-08-18 ENCOUNTER — ANESTHESIA EVENT (OUTPATIENT)
Dept: OPERATING ROOM | Age: 57
End: 2017-08-18
Payer: MEDICARE

## 2017-08-22 LAB
DIRECT EXAM: NORMAL
Lab: NORMAL
SPECIMEN DESCRIPTION: NORMAL
SPECIMEN DESCRIPTION: NORMAL
STATUS: NORMAL

## 2017-08-23 LAB
EKG ATRIAL RATE: 77 BPM
EKG P AXIS: 30 DEGREES
EKG P-R INTERVAL: 168 MS
EKG Q-T INTERVAL: 390 MS
EKG QRS DURATION: 84 MS
EKG QTC CALCULATION (BAZETT): 441 MS
EKG R AXIS: 28 DEGREES
EKG T AXIS: 35 DEGREES
EKG VENTRICULAR RATE: 77 BPM

## 2017-08-29 ENCOUNTER — ANESTHESIA (OUTPATIENT)
Dept: OPERATING ROOM | Age: 57
End: 2017-08-29
Payer: MEDICARE

## 2017-08-29 ENCOUNTER — HOSPITAL ENCOUNTER (OUTPATIENT)
Age: 57
Setting detail: OUTPATIENT SURGERY
Discharge: HOME | End: 2017-08-29
Attending: ORTHOPAEDIC SURGERY | Admitting: ORTHOPAEDIC SURGERY
Payer: MEDICARE

## 2017-08-29 VITALS — SYSTOLIC BLOOD PRESSURE: 112 MMHG | DIASTOLIC BLOOD PRESSURE: 55 MMHG | OXYGEN SATURATION: 98 %

## 2017-08-29 VITALS
TEMPERATURE: 97.3 F | SYSTOLIC BLOOD PRESSURE: 150 MMHG | OXYGEN SATURATION: 98 % | DIASTOLIC BLOOD PRESSURE: 73 MMHG | BODY MASS INDEX: 31.5 KG/M2 | RESPIRATION RATE: 16 BRPM | HEIGHT: 70 IN | HEART RATE: 75 BPM | WEIGHT: 220 LBS

## 2017-08-29 PROCEDURE — 3700000001 HC ADD 15 MINUTES (ANESTHESIA): Performed by: ORTHOPAEDIC SURGERY

## 2017-08-29 PROCEDURE — 2500000003 HC RX 250 WO HCPCS: Performed by: NURSE ANESTHETIST, CERTIFIED REGISTERED

## 2017-08-29 PROCEDURE — 2580000003 HC RX 258: Performed by: ORTHOPAEDIC SURGERY

## 2017-08-29 PROCEDURE — 7100000031 HC ASPR PHASE II RECOVERY - ADDTL 15 MIN: Performed by: ORTHOPAEDIC SURGERY

## 2017-08-29 PROCEDURE — 7100000030 HC ASPR PHASE II RECOVERY - FIRST 15 MIN: Performed by: ORTHOPAEDIC SURGERY

## 2017-08-29 PROCEDURE — 6360000002 HC RX W HCPCS: Performed by: ORTHOPAEDIC SURGERY

## 2017-08-29 PROCEDURE — 6360000002 HC RX W HCPCS: Performed by: NURSE ANESTHETIST, CERTIFIED REGISTERED

## 2017-08-29 PROCEDURE — 3700000000 HC ANESTHESIA ATTENDED CARE: Performed by: ORTHOPAEDIC SURGERY

## 2017-08-29 PROCEDURE — 7100000000 HC PACU RECOVERY - FIRST 15 MIN: Performed by: ORTHOPAEDIC SURGERY

## 2017-08-29 PROCEDURE — 3600000002 HC SURGERY LEVEL 2 BASE: Performed by: ORTHOPAEDIC SURGERY

## 2017-08-29 PROCEDURE — 7100000001 HC PACU RECOVERY - ADDTL 15 MIN: Performed by: ORTHOPAEDIC SURGERY

## 2017-08-29 PROCEDURE — 3600000012 HC SURGERY LEVEL 2 ADDTL 15MIN: Performed by: ORTHOPAEDIC SURGERY

## 2017-08-29 RX ORDER — MORPHINE SULFATE 2 MG/ML
2 INJECTION, SOLUTION INTRAMUSCULAR; INTRAVENOUS EVERY 5 MIN PRN
Status: DISCONTINUED | OUTPATIENT
Start: 2017-08-29 | End: 2017-08-29 | Stop reason: HOSPADM

## 2017-08-29 RX ORDER — PROPOFOL 10 MG/ML
INJECTION, EMULSION INTRAVENOUS PRN
Status: DISCONTINUED | OUTPATIENT
Start: 2017-08-29 | End: 2017-08-29 | Stop reason: SDUPTHER

## 2017-08-29 RX ORDER — OXYCODONE AND ACETAMINOPHEN 10; 325 MG/1; MG/1
1 TABLET ORAL EVERY 6 HOURS PRN
Qty: 20 TABLET | Refills: 0 | Status: SHIPPED | OUTPATIENT
Start: 2017-08-29 | End: 2017-09-05

## 2017-08-29 RX ORDER — ONDANSETRON 2 MG/ML
4 INJECTION INTRAMUSCULAR; INTRAVENOUS
Status: DISCONTINUED | OUTPATIENT
Start: 2017-08-29 | End: 2017-08-29 | Stop reason: HOSPADM

## 2017-08-29 RX ORDER — FENTANYL CITRATE 50 UG/ML
INJECTION, SOLUTION INTRAMUSCULAR; INTRAVENOUS PRN
Status: DISCONTINUED | OUTPATIENT
Start: 2017-08-29 | End: 2017-08-29 | Stop reason: SDUPTHER

## 2017-08-29 RX ORDER — SODIUM CHLORIDE, SODIUM LACTATE, POTASSIUM CHLORIDE, CALCIUM CHLORIDE 600; 310; 30; 20 MG/100ML; MG/100ML; MG/100ML; MG/100ML
INJECTION, SOLUTION INTRAVENOUS CONTINUOUS
Status: DISCONTINUED | OUTPATIENT
Start: 2017-08-29 | End: 2017-08-29 | Stop reason: HOSPADM

## 2017-08-29 RX ORDER — LIDOCAINE HYDROCHLORIDE 10 MG/ML
INJECTION, SOLUTION EPIDURAL; INFILTRATION; INTRACAUDAL; PERINEURAL PRN
Status: DISCONTINUED | OUTPATIENT
Start: 2017-08-29 | End: 2017-08-29 | Stop reason: SDUPTHER

## 2017-08-29 RX ORDER — OXYCODONE HYDROCHLORIDE AND ACETAMINOPHEN 5; 325 MG/1; MG/1
1 TABLET ORAL
Status: DISCONTINUED | OUTPATIENT
Start: 2017-08-29 | End: 2017-08-29 | Stop reason: HOSPADM

## 2017-08-29 RX ORDER — DIPHENHYDRAMINE HYDROCHLORIDE 50 MG/ML
12.5 INJECTION INTRAMUSCULAR; INTRAVENOUS
Status: DISCONTINUED | OUTPATIENT
Start: 2017-08-29 | End: 2017-08-29 | Stop reason: HOSPADM

## 2017-08-29 RX ORDER — ROPIVACAINE HYDROCHLORIDE 5 MG/ML
INJECTION, SOLUTION EPIDURAL; INFILTRATION; PERINEURAL PRN
Status: DISCONTINUED | OUTPATIENT
Start: 2017-08-29 | End: 2017-08-29 | Stop reason: HOSPADM

## 2017-08-29 RX ADMIN — SODIUM CHLORIDE, POTASSIUM CHLORIDE, SODIUM LACTATE AND CALCIUM CHLORIDE: 600; 310; 30; 20 INJECTION, SOLUTION INTRAVENOUS at 10:27

## 2017-08-29 RX ADMIN — SODIUM CHLORIDE, POTASSIUM CHLORIDE, SODIUM LACTATE AND CALCIUM CHLORIDE: 600; 310; 30; 20 INJECTION, SOLUTION INTRAVENOUS at 11:33

## 2017-08-29 RX ADMIN — FENTANYL CITRATE 100 MCG: 50 INJECTION, SOLUTION INTRAMUSCULAR; INTRAVENOUS at 11:39

## 2017-08-29 RX ADMIN — PROPOFOL 200 MG: 10 INJECTION, EMULSION INTRAVENOUS at 11:36

## 2017-08-29 RX ADMIN — LIDOCAINE HYDROCHLORIDE 50 MG: 10 INJECTION, SOLUTION EPIDURAL; INFILTRATION; INTRACAUDAL; PERINEURAL at 11:36

## 2017-08-29 ASSESSMENT — PAIN SCALES - GENERAL
PAINLEVEL_OUTOF10: 0
PAINLEVEL_OUTOF10: 0

## 2017-08-29 ASSESSMENT — PAIN - FUNCTIONAL ASSESSMENT: PAIN_FUNCTIONAL_ASSESSMENT: 0-10

## 2017-09-06 ENCOUNTER — OFFICE VISIT (OUTPATIENT)
Dept: ORTHOPEDIC SURGERY | Age: 57
End: 2017-09-06

## 2017-09-06 DIAGNOSIS — G56.03 CARPAL TUNNEL SYNDROME, BILATERAL: Primary | ICD-10-CM

## 2017-09-06 PROCEDURE — 99024 POSTOP FOLLOW-UP VISIT: CPT | Performed by: ORTHOPAEDIC SURGERY

## 2017-09-11 ENCOUNTER — OFFICE VISIT (OUTPATIENT)
Dept: GASTROENTEROLOGY | Age: 57
End: 2017-09-11

## 2017-09-11 VITALS
WEIGHT: 226.1 LBS | OXYGEN SATURATION: 99 % | HEIGHT: 70 IN | TEMPERATURE: 96.8 F | HEART RATE: 94 BPM | DIASTOLIC BLOOD PRESSURE: 83 MMHG | BODY MASS INDEX: 32.37 KG/M2 | SYSTOLIC BLOOD PRESSURE: 149 MMHG

## 2017-09-11 DIAGNOSIS — Z86.19 HISTORY OF HEPATITIS C: ICD-10-CM

## 2017-09-11 DIAGNOSIS — K75.9 HEPATITIS, UNSPECIFIED: ICD-10-CM

## 2017-09-11 DIAGNOSIS — K74.60 HEPATIC CIRRHOSIS, UNSPECIFIED HEPATIC CIRRHOSIS TYPE (HCC): ICD-10-CM

## 2017-09-11 DIAGNOSIS — Z86.010 HISTORY OF COLON POLYPS: ICD-10-CM

## 2017-09-11 DIAGNOSIS — K76.0 FATTY LIVER: Primary | ICD-10-CM

## 2017-09-11 PROCEDURE — 3017F COLORECTAL CA SCREEN DOC REV: CPT | Performed by: INTERNAL MEDICINE

## 2017-09-11 PROCEDURE — 99214 OFFICE O/P EST MOD 30 MIN: CPT | Performed by: INTERNAL MEDICINE

## 2017-09-11 PROCEDURE — G8427 DOCREV CUR MEDS BY ELIG CLIN: HCPCS | Performed by: INTERNAL MEDICINE

## 2017-09-11 PROCEDURE — G8417 CALC BMI ABV UP PARAM F/U: HCPCS | Performed by: INTERNAL MEDICINE

## 2017-09-11 PROCEDURE — 1036F TOBACCO NON-USER: CPT | Performed by: INTERNAL MEDICINE

## 2017-09-11 ASSESSMENT — ENCOUNTER SYMPTOMS
CHOKING: 0
FACIAL SWELLING: 0
GASTROINTESTINAL NEGATIVE: 1
CONSTIPATION: 0
VOICE CHANGE: 0
ABDOMINAL PAIN: 0
SORE THROAT: 0
SHORTNESS OF BREATH: 0
RECTAL PAIN: 0
RESPIRATORY NEGATIVE: 1
TROUBLE SWALLOWING: 0
DIARRHEA: 0
ALLERGIC/IMMUNOLOGIC NEGATIVE: 1
ABDOMINAL DISTENTION: 0
WHEEZING: 0
BACK PAIN: 1
COUGH: 0
RHINORRHEA: 0
SINUS PRESSURE: 0
ANAL BLEEDING: 0
VOMITING: 0
NAUSEA: 0
BLOOD IN STOOL: 0

## 2017-09-13 ENCOUNTER — HOSPITAL ENCOUNTER (OUTPATIENT)
Age: 57
Setting detail: SPECIMEN
Discharge: HOME | End: 2017-09-13
Payer: MEDICARE

## 2017-09-13 ENCOUNTER — OFFICE VISIT (OUTPATIENT)
Dept: FAMILY MEDICINE CLINIC | Age: 57
End: 2017-09-13
Payer: MEDICARE

## 2017-09-13 VITALS
BODY MASS INDEX: 32.86 KG/M2 | DIASTOLIC BLOOD PRESSURE: 70 MMHG | HEART RATE: 70 BPM | WEIGHT: 229 LBS | SYSTOLIC BLOOD PRESSURE: 122 MMHG | OXYGEN SATURATION: 98 % | TEMPERATURE: 97.8 F

## 2017-09-13 DIAGNOSIS — R35.1 NOCTURIA: ICD-10-CM

## 2017-09-13 DIAGNOSIS — R35.0 URINARY FREQUENCY: ICD-10-CM

## 2017-09-13 DIAGNOSIS — Z23 NEED FOR INFLUENZA VACCINATION: ICD-10-CM

## 2017-09-13 DIAGNOSIS — K70.31 ALCOHOLIC CIRRHOSIS OF LIVER WITH ASCITES (HCC): ICD-10-CM

## 2017-09-13 DIAGNOSIS — R53.83 FATIGUE, UNSPECIFIED TYPE: ICD-10-CM

## 2017-09-13 DIAGNOSIS — E55.9 VITAMIN D DEFICIENCY: ICD-10-CM

## 2017-09-13 DIAGNOSIS — G47.00 INSOMNIA, UNSPECIFIED TYPE: Primary | ICD-10-CM

## 2017-09-13 LAB
SEX HORMONE BINDING GLOBULIN: 99 NMOL/L (ref 11–80)
TESTOSTERONE FREE-NONMALE: 41 PG/ML (ref 47–244)
TESTOSTERONE TOTAL: 451 NG/DL (ref 220–1000)
VITAMIN D 25-HYDROXY: 12.2 NG/ML (ref 30–100)

## 2017-09-13 PROCEDURE — 99214 OFFICE O/P EST MOD 30 MIN: CPT | Performed by: NURSE PRACTITIONER

## 2017-09-13 PROCEDURE — 3017F COLORECTAL CA SCREEN DOC REV: CPT | Performed by: NURSE PRACTITIONER

## 2017-09-13 PROCEDURE — 1036F TOBACCO NON-USER: CPT | Performed by: NURSE PRACTITIONER

## 2017-09-13 PROCEDURE — G0008 ADMIN INFLUENZA VIRUS VAC: HCPCS | Performed by: NURSE PRACTITIONER

## 2017-09-13 PROCEDURE — G8427 DOCREV CUR MEDS BY ELIG CLIN: HCPCS | Performed by: NURSE PRACTITIONER

## 2017-09-13 PROCEDURE — G8417 CALC BMI ABV UP PARAM F/U: HCPCS | Performed by: NURSE PRACTITIONER

## 2017-09-13 PROCEDURE — 90688 IIV4 VACCINE SPLT 0.5 ML IM: CPT | Performed by: NURSE PRACTITIONER

## 2017-09-13 RX ORDER — TAMSULOSIN HYDROCHLORIDE 0.4 MG/1
0.4 CAPSULE ORAL DAILY
Qty: 30 CAPSULE | Refills: 11 | Status: CANCELLED | OUTPATIENT
Start: 2017-09-13

## 2017-09-13 RX ORDER — ZOLPIDEM TARTRATE 5 MG/1
TABLET ORAL
Qty: 30 TABLET | Refills: 2 | Status: SHIPPED | OUTPATIENT
Start: 2017-09-13 | End: 2017-12-04 | Stop reason: SDUPTHER

## 2017-09-13 RX ORDER — FUROSEMIDE 40 MG/1
40 TABLET ORAL DAILY
Qty: 60 TABLET | Refills: 5 | Status: SHIPPED | OUTPATIENT
Start: 2017-09-13 | End: 2018-05-18 | Stop reason: SDUPTHER

## 2017-09-13 RX ORDER — DICLOFENAC SODIUM 75 MG/1
75 TABLET, DELAYED RELEASE ORAL 2 TIMES DAILY
Qty: 60 TABLET | Refills: 2 | Status: SHIPPED | OUTPATIENT
Start: 2017-09-13 | End: 2017-12-04

## 2017-09-13 ASSESSMENT — ENCOUNTER SYMPTOMS
COUGH: 0
SHORTNESS OF BREATH: 0
WHEEZING: 0
ABDOMINAL PAIN: 0
DIARRHEA: 0
ALLERGIC/IMMUNOLOGIC NEGATIVE: 1
EYES NEGATIVE: 1
RESPIRATORY NEGATIVE: 1
CONSTIPATION: 0
GASTROINTESTINAL NEGATIVE: 1

## 2017-09-20 DIAGNOSIS — E55.9 VITAMIN D DEFICIENCY: Primary | ICD-10-CM

## 2017-09-20 RX ORDER — ERGOCALCIFEROL (VITAMIN D2) 1250 MCG
50000 CAPSULE ORAL WEEKLY
Qty: 4 CAPSULE | Refills: 5 | Status: SHIPPED | OUTPATIENT
Start: 2017-09-20 | End: 2017-10-24 | Stop reason: SDUPTHER

## 2017-10-02 ENCOUNTER — HOSPITAL ENCOUNTER (OUTPATIENT)
Dept: NEUROLOGY | Age: 57
Discharge: HOME | End: 2017-10-02
Payer: MEDICARE

## 2017-10-02 DIAGNOSIS — G56.03 CARPAL TUNNEL SYNDROME, BILATERAL: ICD-10-CM

## 2017-10-02 PROCEDURE — 95886 MUSC TEST DONE W/N TEST COMP: CPT | Performed by: PHYSICAL MEDICINE & REHABILITATION

## 2017-10-02 PROCEDURE — 95910 NRV CNDJ TEST 7-8 STUDIES: CPT | Performed by: PHYSICAL MEDICINE & REHABILITATION

## 2017-10-05 ENCOUNTER — TELEPHONE (OUTPATIENT)
Dept: ORTHOPEDIC SURGERY | Age: 57
End: 2017-10-05

## 2017-10-17 ENCOUNTER — HOSPITAL ENCOUNTER (OUTPATIENT)
Dept: PREADMISSION TESTING | Age: 57
Discharge: HOME | End: 2017-10-17
Payer: MEDICARE

## 2017-10-17 VITALS
HEIGHT: 70 IN | SYSTOLIC BLOOD PRESSURE: 142 MMHG | RESPIRATION RATE: 20 BRPM | BODY MASS INDEX: 33.5 KG/M2 | TEMPERATURE: 97.3 F | WEIGHT: 234 LBS | OXYGEN SATURATION: 99 % | HEART RATE: 80 BPM | DIASTOLIC BLOOD PRESSURE: 89 MMHG

## 2017-10-17 LAB
ABSOLUTE EOS #: 0.1 K/UL (ref 0–0.4)
ABSOLUTE IMMATURE GRANULOCYTE: NORMAL K/UL (ref 0–0.3)
ABSOLUTE LYMPH #: 2 K/UL (ref 1–4.8)
ABSOLUTE MONO #: 0.6 K/UL (ref 0.1–1.3)
ANION GAP SERPL CALCULATED.3IONS-SCNC: 12 MMOL/L (ref 9–17)
BASOPHILS # BLD: 0 %
BASOPHILS ABSOLUTE: 0 K/UL (ref 0–0.2)
BUN BLDV-MCNC: 15 MG/DL (ref 6–20)
BUN/CREAT BLD: ABNORMAL (ref 9–20)
CALCIUM SERPL-MCNC: 9.6 MG/DL (ref 8.6–10.4)
CHLORIDE BLD-SCNC: 102 MMOL/L (ref 98–107)
CO2: 26 MMOL/L (ref 20–31)
CREAT SERPL-MCNC: 0.7 MG/DL (ref 0.7–1.2)
DIFFERENTIAL TYPE: NORMAL
EOSINOPHILS RELATIVE PERCENT: 1 %
GFR AFRICAN AMERICAN: >60 ML/MIN
GFR NON-AFRICAN AMERICAN: >60 ML/MIN
GFR SERPL CREATININE-BSD FRML MDRD: ABNORMAL ML/MIN/{1.73_M2}
GFR SERPL CREATININE-BSD FRML MDRD: ABNORMAL ML/MIN/{1.73_M2}
GLUCOSE BLD-MCNC: 108 MG/DL (ref 70–99)
HCT VFR BLD CALC: 42.6 % (ref 41–53)
HEMOGLOBIN: 14.5 G/DL (ref 13.5–17.5)
IMMATURE GRANULOCYTES: NORMAL %
LYMPHOCYTES # BLD: 27 %
MCH RBC QN AUTO: 30 PG (ref 26–34)
MCHC RBC AUTO-ENTMCNC: 34.1 G/DL (ref 31–37)
MCV RBC AUTO: 87.9 FL (ref 80–100)
MONOCYTES # BLD: 9 %
PDW BLD-RTO: 13.9 % (ref 11.5–14.9)
PLATELET # BLD: 198 K/UL (ref 150–450)
PLATELET ESTIMATE: NORMAL
PMV BLD AUTO: 9.5 FL (ref 6–12)
POTASSIUM SERPL-SCNC: 4.5 MMOL/L (ref 3.7–5.3)
RBC # BLD: 4.84 M/UL (ref 4.5–5.9)
RBC # BLD: NORMAL 10*6/UL
SEG NEUTROPHILS: 63 %
SEGMENTED NEUTROPHILS ABSOLUTE COUNT: 4.6 K/UL (ref 1.3–9.1)
SODIUM BLD-SCNC: 140 MMOL/L (ref 135–144)
WBC # BLD: 7.3 K/UL (ref 3.5–11)
WBC # BLD: NORMAL 10*3/UL

## 2017-10-17 PROCEDURE — 80048 BASIC METABOLIC PNL TOTAL CA: CPT

## 2017-10-17 PROCEDURE — 85025 COMPLETE CBC W/AUTO DIFF WBC: CPT

## 2017-10-17 PROCEDURE — 36415 COLL VENOUS BLD VENIPUNCTURE: CPT

## 2017-10-17 NOTE — H&P
HISTORY and [facility] [address]       NAME:  Ruthy Villa  MRN: [id number]   YOB: 1960   Date: 10/17/2017   Age: 61 y.o. Gender: male       COMPLAINT AND PRESENT HISTORY:     Ruthy Villa is 61 y.o. , male, Preadmission Testing for left carpal tunnel release. Pt states he had the right carpal tunnel release 9/2017. Pt states he has had some numbness and tingling, aching pain 7/10 at the worst. Pain begins at wrist and radiates to the forearm. Numbness and tingling to left thumb, index and middle finger. Symptoms began \"years ago. \" Has difficulty gripping and opening jars. Has tried the splint with no relief. Has some weakness and decreased  strength.      PAST MEDICAL HISTORY     Past Medical History:   Diagnosis Date    Alcohol abuse     6-12 beers a day; quit drinking July 2016    Arthritis     Back pain, chronic     dr. Shirley George, orthopedic, every 3-4 months, gets steroid injection    BPH (benign prostatic hypertrophy)     Cholelithiasis     Cirrhosis ([facility] [address] [facility]     DDD (degenerative disc disease), lumbar     Fatty liver     GERD (gastroesophageal reflux disease)     Hep C w/o coma, chronic (HCC)     History of colon polyps 2016    [place] (hard of hearing)     Stomach ulcer     hx of    Tobacco abuse     Tubular adenoma of colon 2016    Wears glasses      Pt denies any history of Diabetes mellitus type 2, hypertension, stroke, heart disease, COPD, Asthma, HLD, Seizures,Thyroid disease, TB.     SURGICAL HISTORY       Past Surgical History:   Procedure Laterality Date    BUNIONECTOMY      twice on right side    BUNIONECTOMY Left     CARPAL TUNNEL RELEASE Right     COLONOSCOPY      at age 35    COLONOSCOPY  10/05/2016    polyps-pathology tubular adenoma, and abnormal looking mucosa right colon-pathology-tubular adenoma    ENDOSCOPY, COLON, DIAGNOSTIC      EGD    KNEE SURGERY Left     cyst removed    NASAL SEPTUM SURGERY      OTHER SURGICAL HISTORY

## 2017-10-18 ENCOUNTER — ANESTHESIA EVENT (OUTPATIENT)
Dept: OPERATING ROOM | Age: 57
End: 2017-10-18
Payer: MEDICARE

## 2017-10-18 RX ORDER — SODIUM CHLORIDE 0.9 % (FLUSH) 0.9 %
10 SYRINGE (ML) INJECTION EVERY 12 HOURS SCHEDULED
Status: CANCELLED | OUTPATIENT
Start: 2017-10-18

## 2017-10-18 RX ORDER — SODIUM CHLORIDE, SODIUM LACTATE, POTASSIUM CHLORIDE, CALCIUM CHLORIDE 600; 310; 30; 20 MG/100ML; MG/100ML; MG/100ML; MG/100ML
INJECTION, SOLUTION INTRAVENOUS CONTINUOUS
Status: CANCELLED | OUTPATIENT
Start: 2017-10-18

## 2017-10-18 RX ORDER — LIDOCAINE HYDROCHLORIDE 10 MG/ML
1 INJECTION, SOLUTION EPIDURAL; INFILTRATION; INTRACAUDAL; PERINEURAL
Status: CANCELLED | OUTPATIENT
Start: 2017-10-18 | End: 2017-10-18

## 2017-10-18 RX ORDER — SODIUM CHLORIDE 0.9 % (FLUSH) 0.9 %
10 SYRINGE (ML) INJECTION PRN
Status: CANCELLED | OUTPATIENT
Start: 2017-10-18

## 2017-10-24 RX ORDER — ERGOCALCIFEROL (VITAMIN D2) 1250 MCG
50000 CAPSULE ORAL WEEKLY
Qty: 4 CAPSULE | Refills: 5 | Status: SHIPPED | OUTPATIENT
Start: 2017-10-24 | End: 2018-03-12 | Stop reason: ALTCHOICE

## 2017-10-31 ENCOUNTER — HOSPITAL ENCOUNTER (OUTPATIENT)
Age: 57
Setting detail: OUTPATIENT SURGERY
Discharge: HOME | End: 2017-10-31
Attending: ORTHOPAEDIC SURGERY | Admitting: ORTHOPAEDIC SURGERY
Payer: MEDICARE

## 2017-10-31 ENCOUNTER — ANESTHESIA (OUTPATIENT)
Dept: OPERATING ROOM | Age: 57
End: 2017-10-31
Payer: MEDICARE

## 2017-10-31 VITALS — OXYGEN SATURATION: 99 % | DIASTOLIC BLOOD PRESSURE: 74 MMHG | TEMPERATURE: 96.6 F | SYSTOLIC BLOOD PRESSURE: 126 MMHG

## 2017-10-31 VITALS
RESPIRATION RATE: 13 BRPM | OXYGEN SATURATION: 97 % | TEMPERATURE: 98.1 F | BODY MASS INDEX: 32.5 KG/M2 | WEIGHT: 227 LBS | HEIGHT: 70 IN | HEART RATE: 78 BPM | DIASTOLIC BLOOD PRESSURE: 92 MMHG | SYSTOLIC BLOOD PRESSURE: 151 MMHG

## 2017-10-31 PROCEDURE — 6360000002 HC RX W HCPCS: Performed by: ORTHOPAEDIC SURGERY

## 2017-10-31 PROCEDURE — 7100000030 HC ASPR PHASE II RECOVERY - FIRST 15 MIN: Performed by: ORTHOPAEDIC SURGERY

## 2017-10-31 PROCEDURE — 2580000003 HC RX 258: Performed by: ANESTHESIOLOGY

## 2017-10-31 PROCEDURE — 7100000000 HC PACU RECOVERY - FIRST 15 MIN: Performed by: ORTHOPAEDIC SURGERY

## 2017-10-31 PROCEDURE — 2500000003 HC RX 250 WO HCPCS: Performed by: NURSE ANESTHETIST, CERTIFIED REGISTERED

## 2017-10-31 PROCEDURE — 3700000001 HC ADD 15 MINUTES (ANESTHESIA): Performed by: ORTHOPAEDIC SURGERY

## 2017-10-31 PROCEDURE — 3600000012 HC SURGERY LEVEL 2 ADDTL 15MIN: Performed by: ORTHOPAEDIC SURGERY

## 2017-10-31 PROCEDURE — 6360000002 HC RX W HCPCS: Performed by: NURSE ANESTHETIST, CERTIFIED REGISTERED

## 2017-10-31 PROCEDURE — 7100000031 HC ASPR PHASE II RECOVERY - ADDTL 15 MIN: Performed by: ORTHOPAEDIC SURGERY

## 2017-10-31 PROCEDURE — 3700000000 HC ANESTHESIA ATTENDED CARE: Performed by: ORTHOPAEDIC SURGERY

## 2017-10-31 PROCEDURE — 3600000002 HC SURGERY LEVEL 2 BASE: Performed by: ORTHOPAEDIC SURGERY

## 2017-10-31 PROCEDURE — 7100000001 HC PACU RECOVERY - ADDTL 15 MIN: Performed by: ORTHOPAEDIC SURGERY

## 2017-10-31 RX ORDER — OXYCODONE HYDROCHLORIDE AND ACETAMINOPHEN 5; 325 MG/1; MG/1
2 TABLET ORAL EVERY 4 HOURS PRN
Qty: 20 TABLET | Refills: 0 | Status: SHIPPED | OUTPATIENT
Start: 2017-10-31 | End: 2017-11-07

## 2017-10-31 RX ORDER — SODIUM CHLORIDE 0.9 % (FLUSH) 0.9 %
10 SYRINGE (ML) INJECTION EVERY 12 HOURS SCHEDULED
Status: DISCONTINUED | OUTPATIENT
Start: 2017-10-31 | End: 2017-10-31 | Stop reason: HOSPADM

## 2017-10-31 RX ORDER — KETOROLAC TROMETHAMINE 30 MG/ML
INJECTION, SOLUTION INTRAMUSCULAR; INTRAVENOUS PRN
Status: DISCONTINUED | OUTPATIENT
Start: 2017-10-31 | End: 2017-10-31 | Stop reason: SDUPTHER

## 2017-10-31 RX ORDER — MEPERIDINE HYDROCHLORIDE 50 MG/ML
12.5 INJECTION INTRAMUSCULAR; INTRAVENOUS; SUBCUTANEOUS EVERY 5 MIN PRN
Status: DISCONTINUED | OUTPATIENT
Start: 2017-10-31 | End: 2017-10-31 | Stop reason: HOSPADM

## 2017-10-31 RX ORDER — ROPIVACAINE HYDROCHLORIDE 5 MG/ML
INJECTION, SOLUTION EPIDURAL; INFILTRATION; PERINEURAL PRN
Status: DISCONTINUED | OUTPATIENT
Start: 2017-10-31 | End: 2017-10-31 | Stop reason: HOSPADM

## 2017-10-31 RX ORDER — MORPHINE SULFATE 4 MG/ML
2 INJECTION, SOLUTION INTRAMUSCULAR; INTRAVENOUS EVERY 5 MIN PRN
Status: DISCONTINUED | OUTPATIENT
Start: 2017-10-31 | End: 2017-10-31 | Stop reason: HOSPADM

## 2017-10-31 RX ORDER — ONDANSETRON 2 MG/ML
4 INJECTION INTRAMUSCULAR; INTRAVENOUS
Status: DISCONTINUED | OUTPATIENT
Start: 2017-10-31 | End: 2017-10-31 | Stop reason: HOSPADM

## 2017-10-31 RX ORDER — PROPOFOL 10 MG/ML
INJECTION, EMULSION INTRAVENOUS PRN
Status: DISCONTINUED | OUTPATIENT
Start: 2017-10-31 | End: 2017-10-31 | Stop reason: SDUPTHER

## 2017-10-31 RX ORDER — LIDOCAINE HYDROCHLORIDE 10 MG/ML
1 INJECTION, SOLUTION EPIDURAL; INFILTRATION; INTRACAUDAL; PERINEURAL
Status: DISCONTINUED | OUTPATIENT
Start: 2017-10-31 | End: 2017-10-31 | Stop reason: HOSPADM

## 2017-10-31 RX ORDER — LABETALOL HYDROCHLORIDE 5 MG/ML
5 INJECTION, SOLUTION INTRAVENOUS EVERY 10 MIN PRN
Status: DISCONTINUED | OUTPATIENT
Start: 2017-10-31 | End: 2017-10-31 | Stop reason: HOSPADM

## 2017-10-31 RX ORDER — LIDOCAINE HYDROCHLORIDE 10 MG/ML
INJECTION, SOLUTION EPIDURAL; INFILTRATION; INTRACAUDAL; PERINEURAL PRN
Status: DISCONTINUED | OUTPATIENT
Start: 2017-10-31 | End: 2017-10-31 | Stop reason: SDUPTHER

## 2017-10-31 RX ORDER — DEXAMETHASONE SODIUM PHOSPHATE 4 MG/ML
INJECTION, SOLUTION INTRA-ARTICULAR; INTRALESIONAL; INTRAMUSCULAR; INTRAVENOUS; SOFT TISSUE PRN
Status: DISCONTINUED | OUTPATIENT
Start: 2017-10-31 | End: 2017-10-31 | Stop reason: SDUPTHER

## 2017-10-31 RX ORDER — FENTANYL CITRATE 50 UG/ML
INJECTION, SOLUTION INTRAMUSCULAR; INTRAVENOUS PRN
Status: DISCONTINUED | OUTPATIENT
Start: 2017-10-31 | End: 2017-10-31 | Stop reason: SDUPTHER

## 2017-10-31 RX ORDER — SODIUM CHLORIDE, SODIUM LACTATE, POTASSIUM CHLORIDE, CALCIUM CHLORIDE 600; 310; 30; 20 MG/100ML; MG/100ML; MG/100ML; MG/100ML
INJECTION, SOLUTION INTRAVENOUS CONTINUOUS
Status: DISCONTINUED | OUTPATIENT
Start: 2017-10-31 | End: 2017-10-31 | Stop reason: HOSPADM

## 2017-10-31 RX ORDER — DIPHENHYDRAMINE HYDROCHLORIDE 50 MG/ML
12.5 INJECTION INTRAMUSCULAR; INTRAVENOUS
Status: DISCONTINUED | OUTPATIENT
Start: 2017-10-31 | End: 2017-10-31 | Stop reason: HOSPADM

## 2017-10-31 RX ORDER — HYDROMORPHONE HCL 110MG/55ML
0.5 PATIENT CONTROLLED ANALGESIA SYRINGE INTRAVENOUS EVERY 5 MIN PRN
Status: DISCONTINUED | OUTPATIENT
Start: 2017-10-31 | End: 2017-10-31 | Stop reason: HOSPADM

## 2017-10-31 RX ORDER — OXYCODONE HYDROCHLORIDE AND ACETAMINOPHEN 5; 325 MG/1; MG/1
2 TABLET ORAL EVERY 4 HOURS PRN
Status: DISCONTINUED | OUTPATIENT
Start: 2017-10-31 | End: 2017-10-31 | Stop reason: HOSPADM

## 2017-10-31 RX ORDER — ONDANSETRON 2 MG/ML
INJECTION INTRAMUSCULAR; INTRAVENOUS PRN
Status: DISCONTINUED | OUTPATIENT
Start: 2017-10-31 | End: 2017-10-31 | Stop reason: SDUPTHER

## 2017-10-31 RX ORDER — MIDAZOLAM HYDROCHLORIDE 1 MG/ML
INJECTION INTRAMUSCULAR; INTRAVENOUS PRN
Status: DISCONTINUED | OUTPATIENT
Start: 2017-10-31 | End: 2017-10-31 | Stop reason: SDUPTHER

## 2017-10-31 RX ORDER — SODIUM CHLORIDE 0.9 % (FLUSH) 0.9 %
10 SYRINGE (ML) INJECTION PRN
Status: DISCONTINUED | OUTPATIENT
Start: 2017-10-31 | End: 2017-10-31 | Stop reason: HOSPADM

## 2017-10-31 RX ADMIN — SODIUM CHLORIDE, POTASSIUM CHLORIDE, SODIUM LACTATE AND CALCIUM CHLORIDE: 600; 310; 30; 20 INJECTION, SOLUTION INTRAVENOUS at 11:39

## 2017-10-31 RX ADMIN — PROPOFOL 200 MG: 10 INJECTION, EMULSION INTRAVENOUS at 13:15

## 2017-10-31 RX ADMIN — MIDAZOLAM 2 MG: 1 INJECTION INTRAMUSCULAR; INTRAVENOUS at 13:10

## 2017-10-31 RX ADMIN — DEXAMETHASONE SODIUM PHOSPHATE 4 MG: 4 INJECTION, SOLUTION INTRAMUSCULAR; INTRAVENOUS at 13:23

## 2017-10-31 RX ADMIN — ONDANSETRON 4 MG: 2 INJECTION INTRAMUSCULAR; INTRAVENOUS at 13:23

## 2017-10-31 RX ADMIN — LIDOCAINE HYDROCHLORIDE 40 MG: 10 INJECTION, SOLUTION EPIDURAL; INFILTRATION; INTRACAUDAL; PERINEURAL at 13:15

## 2017-10-31 RX ADMIN — FENTANYL CITRATE 100 MCG: 50 INJECTION INTRAMUSCULAR; INTRAVENOUS at 13:15

## 2017-10-31 RX ADMIN — KETOROLAC TROMETHAMINE 30 MG: 30 INJECTION, SOLUTION INTRAMUSCULAR at 13:23

## 2017-10-31 ASSESSMENT — ENCOUNTER SYMPTOMS
STRIDOR: 0
SHORTNESS OF BREATH: 0

## 2017-10-31 ASSESSMENT — PAIN SCALES - GENERAL: PAINLEVEL_OUTOF10: 0

## 2017-10-31 ASSESSMENT — LIFESTYLE VARIABLES: SMOKING_STATUS: 0

## 2017-10-31 ASSESSMENT — PAIN - FUNCTIONAL ASSESSMENT: PAIN_FUNCTIONAL_ASSESSMENT: 0-10

## 2017-10-31 NOTE — ANESTHESIA PRE PROCEDURE
Department of Anesthesiology  Preprocedure Note       Name:  Say Vivas   Age:  61 y.o.  :  1960                                          MRN:  [id number]         Date:  10/31/2017      Surgeon: Nneka Brothers):  Saba Mora MD    Procedure: Procedure(s):  CARPAL TUNNEL RELEASE    Medications prior to admission:   Prior to Admission medications    Medication Sig Start Date End Date Taking?  Authorizing Provider   ergocalciferol (DRISDOL) 31800 units capsule Take 1 capsule by mouth once a week 10/24/17  Yes Karissa Borges NP   zolpidem (AMBIEN) 5 MG tablet take 1 tablet by mouth NIGHTLY if needed for sleep 17  Yes Manish Pathak NP   furosemide (LASIX) 40 MG tablet Take 1 tablet by mouth daily 17  Yes Manish Pathak NP   diclofenac (VOLTAREN) 75 MG EC tablet Take 1 tablet by mouth 2 times daily 17  Yes Manish Pathak NP   tamsulosin (FLOMAX) 0.4 MG capsule Take 1 capsule by mouth daily 17  Yes Yazmin Pelletier MD       Current medications:    Current Facility-Administered Medications   Medication Dose Route Frequency Provider Last Rate Last Dose    lactated ringers infusion   Intravenous Continuous Bianca Scott  mL/hr at 10/31/17 1139      lidocaine PF 1 % injection 1 mL  1 mL Intradermal Once PRN Bianca Scott MD        sodium chloride flush 0.9 % injection 10 mL  10 mL Intravenous 2 times per day Bianca Scott MD        sodium chloride flush 0.9 % injection 10 mL  10 mL Intravenous PRN Bianca Scott MD           Allergies:  No Known Allergies    Problem List:    Patient Active Problem List   Diagnosis Code    Back pain, chronic M54.9, G89.29    Hearing difficulty H91.90    GERD (gastroesophageal reflux disease) K21.9    Cervical radicular pain M54.12    Alcohol withdrawal (HCC) F10.239    Hyponatremia N26.5    Alcoholic cirrhosis of liver with ascites ([facility] [address] K70.31    Ascites R18.8    Hypomagnesemia E83.42    Chronic viral hepatitis B without delta agent and without coma ([facility] [address] B18.1    Acute hepatitis C virus infection without hepatic coma B17.10    Ascites due to alcoholic hepatitis Y33.80    Calculus of gallbladder without cholecystitis K80.20    Hep C w/o coma, chronic (HCC) B18.2    Fatty liver K76.0    Weight loss R63.4    Itching L29.9    Insomnia G47.00    Cirrhosis (HCC) K74.60    Cholelithiasis K80.20    Hepatic cirrhosis (HCC) K74.60    Chronic midline low back pain without sciatica M54.5, G89.29    DDD (degenerative disc disease), lumbar M51.36    Depression F32.9    Grief F43.20    Tubular adenoma of colon D12.6    History of colon polyps Z86.010    Gynecomastia, male N58    Lumbar radiculitis M54.16    Lumbar disc herniation M51.26    Tinnitus H93.19    Eustachian tube dysfunction H69.80    Ganglion cyst M67.40    Carpal tunnel syndrome of right wrist G56.01    History of hepatitis C Z86.19    Vitamin D deficiency E55.9       Past Medical History:        Diagnosis Date    Alcohol abuse     6-12 beers a day; quit drinking July 2016    Arthritis     Back pain, chronic     dr. Lobo Munoz, orthopedic, every 3-4 months, gets steroid injection    BPH (benign prostatic hypertrophy)     Cholelithiasis     Cirrhosis ([facility] [address]     DDD (degenerative disc disease), lumbar     Fatty liver     GERD (gastroesophageal reflux disease)     Hep C w/o coma, chronic (HCC)     History of colon polyps 2016    [place] (hard of hearing)     Stomach ulcer     hx of    Tobacco abuse     Tubular adenoma of colon 2016    Wears glasses        Past Surgical History:        Procedure Laterality Date    BUNIONECTOMY      twice on right side    BUNIONECTOMY Left     CARPAL TUNNEL RELEASE Right     COLONOSCOPY      at age 35    COLONOSCOPY  10/05/2016    polyps-pathology tubular adenoma, and abnormal looking mucosa right colon-pathology-tubular adenoma    ENDOSCOPY, COLON, DIAGNOSTIC      EGD    KNEE SURGERY Left     cyst removed    NASAL SEPTUM Induction: intravenous. MIPS: Postoperative opioids intended and Prophylactic antiemetics administered. Anesthetic plan and risks discussed with patient. Plan discussed with CRNA.                   Tomas Davis MD   10/31/2017

## 2017-10-31 NOTE — INTERVAL H&P NOTE
HISTORY and [facility] A[facility] [address]       NAME:  Jered Duvall  MRN: [id number]   YOB: 1960   Date: 10/31/2017   Age: 61 y.o. Gender: male     H&P Update Note    H&P from 10/17/2017  reviewed and updated, Patient examined. Vitals: /79   Pulse 79   Temp 97.3 °F (36.3 °C) (Oral)   Resp 16   Ht 5' 10\" (1.778 m)   Wt 227 lb (103 kg)   SpO2 96%   BMI 32.57 kg/m²  Body mass index is 32.57 kg/m². No interval changes. I concur with the findings.        Ana Burr PA-C on 10/31/2017 at 11:20 AM

## 2017-10-31 NOTE — ANESTHESIA POSTPROCEDURE EVALUATION
POST- ANESTHESIA EVALUATION       Pt Name: Leticia Chao  MRN: [id number]  YOB: 1960  Date of evaluation: 10/31/2017  Time:  3:33 PM      BP (!) 151/92   Pulse 78   Temp 98.1 °F (36.7 °C)   Resp 13   Ht 5' 10\" (1.778 m)   Wt 227 lb (103 kg)   SpO2 97%   BMI 32.57 kg/m²      Consciousness Level  Awake  Cardiopulmonary Status  Stable  Pain Adequately Treated YES  Nausea / Vomiting  NO  Adequate Hydration  YES  Anesthesia Related Complications NONE      Electronically signed by Kei Pulliam MD on 10/31/2017 at 3:33 PM       Department of Anesthesiology  Postprocedure Note    Patient: Leticia Chao  MRN: [id number]  YOB: 1960  Date of evaluation: 10/31/2017  Time:  3:33 PM     Procedure Summary     Date:  10/31/17 Room / Location:  [address] [place] [address] [address] / [address]    Anesthesia Start:  1310 Anesthesia Stop:  2608    Procedure:  CARPAL TUNNEL RELEASE (Left Hand) Diagnosis:  (LEFT CARPAL TUNNEL SYNDROME )    Surgeon:  Mary Kay Azul MD Responsible Provider:  Kei Pulliam MD    Anesthesia Type:  general ASA Status:  3          Anesthesia Type: general    Yen Phase I: Yen Score: 9    Yen Phase II: Yen Score: 10    Last vitals: Reviewed and per EMR flowsheets.        Anesthesia Post Evaluation

## 2017-10-31 NOTE — OP NOTE
Operative dictation  . Diagnosis: Carpal tunnel syndrome left  Posterior diagnosis: Same  Procedure: Left carpal tunnel release  [facility] [address] [facility]  Anesthesia Gen. Uzair Gross is a 79-year-old gentleman who had his right carpal tunnel release a month and half ago now presents for his left having failed conservative treatment and a positive EMG/NCV    Patient taken operating room had successful induction of general anesthesia. Tourniquet applied to left upper extremity. There was a prepped and draped in usual fashion. The arms extended and the tourniquet inflated to 280 mmHg. A small incision was made at the intersections of lines drawn [place] thumb and the radial sided ring finger. This extended proximally slightly ulnarly for about a centimeter and a half. The skin and subcu were split and retracted position. A mosquito hemostat was used to spread the palmar fascia in order to gain access to the transverse carpal ligament. The transverse carpal ligament was then punctured with a mosquito hemostat and this was used as a guide to protect underlying structures while the transverse carpal ligament was incised with 15 blade beginning distally and with the wrist hyperextended approximately. Complete release of the transverse carpal he was carried out with the blunt Metzenbaum scissors into the deep volar forearm fascia. This was also used to complete the release distally. After complete release was verified wound was irrigated and instilled with 8 mL 0.2% or open and the skin and subcu holes together the 4-0 nylon suture. Sterile bulky dressing was applied. The tourniquet was released. Tourniquet time less than 20 minutes.   Patient was awakened and taken to postanesthesia care unit in good condition

## 2017-10-31 NOTE — H&P (VIEW-ONLY)
HISTORY and [facility] [address]       NAME:  Shreya Barrios  MRN: [id number]   YOB: 1960   Date: 10/17/2017   Age: 61 y.o. Gender: male       COMPLAINT AND PRESENT HISTORY:     Shreya Barrios is 61 y.o. , male, Preadmission Testing for left carpal tunnel release. Pt states he had the right carpal tunnel release 9/2017. Pt states he has had some numbness and tingling, aching pain 7/10 at the worst. Pain begins at wrist and radiates to the forearm. Numbness and tingling to left thumb, index and middle finger. Symptoms began \"years ago. \" Has difficulty gripping and opening jars. Has tried the splint with no relief. Has some weakness and decreased  strength.      PAST MEDICAL HISTORY     Past Medical History:   Diagnosis Date    Alcohol abuse     6-12 beers a day; quit drinking July 2016    Arthritis     Back pain, chronic     dr. Gordo Ramírez, orthopedic, every 3-4 months, gets steroid injection    BPH (benign prostatic hypertrophy)     Cholelithiasis     Cirrhosis ([address]     DDD (degenerative disc disease), lumbar     Fatty liver     GERD (gastroesophageal reflux disease)     Hep C w/o coma, chronic (HCC)     History of colon polyps 2016    [place] (hard of hearing)     Stomach ulcer     hx of    Tobacco abuse     Tubular adenoma of colon 2016    Wears glasses      Pt denies any history of Diabetes mellitus type 2, hypertension, stroke, heart disease, COPD, Asthma, HLD, Seizures,Thyroid disease, TB.     SURGICAL HISTORY       Past Surgical History:   Procedure Laterality Date    BUNIONECTOMY      twice on right side    BUNIONECTOMY Left     CARPAL TUNNEL RELEASE Right     COLONOSCOPY      at age 35    COLONOSCOPY  10/05/2016    polyps-pathology tubular adenoma, and abnormal looking mucosa right colon-pathology-tubular adenoma    ENDOSCOPY, COLON, DIAGNOSTIC      EGD    KNEE SURGERY Left     cyst removed    NASAL SEPTUM SURGERY      OTHER SURGICAL HISTORY 01/04/16    steroid injection C7 T1    OTHER SURGICAL HISTORY  11/21/2016    Bilateral Lumbar CACHORRO L4-L5 injections    OTHER SURGICAL HISTORY  12/19/2016    lumbar steroid injection    ND REVISE MEDIAN N/CARPAL TUNNEL SURG Right 8/29/2017    CARPAL TUNNEL RELEASE RIGHT performed by Nilam Overton MD at [facility] HISTORY       Family History   Problem Relation Age of Onset    Cancer Mother      pancreatic    Cancer Father      bone    Diabetes Sister     Asthma Brother        SOCIAL HISTORY       Social History     Social History    Marital status: Single     Spouse name: N/A    Number of children: N/A    Years of education: N/A     Social History Main Topics    Smoking status: Former Smoker     Packs/day: 1.00     Years: 45.00     Quit date: 1/17/2017    Smokeless tobacco: Never Used    Alcohol use No      Comment: 7/17/2016 he stopped drinking    Drug use: No      Comment: cocaine, not regularly stopped spring 2016    Sexual activity: Yes     Partners: Female     Other Topics Concern    None     Social History Narrative     in the past, retired           REVIEW OF SYSTEMS      No Known Allergies    Current Outpatient Prescriptions on File Prior to Encounter   Medication Sig Dispense Refill    zolpidem (AMBIEN) 5 MG tablet take 1 tablet by mouth NIGHTLY if needed for sleep 30 tablet 2    furosemide (LASIX) 40 MG tablet Take 1 tablet by mouth daily 60 tablet 5    diclofenac (VOLTAREN) 75 MG EC tablet Take 1 tablet by mouth 2 times daily 60 tablet 2    tamsulosin (FLOMAX) 0.4 MG capsule Take 1 capsule by mouth daily 30 capsule 11    ergocalciferol (DRISDOL) 73451 units capsule Take 1 capsule by mouth once a week 4 capsule 5     No current facility-administered medications on file prior to encounter. General health:  Fairly good. No fever or chills. Skin:  No itching, redness or rash. HEENT:  No headache, epistaxis or sore throat. Neck:  No pain, stiffness or masses. Cardiovascular/Respiratory system:  No chest pain, palpitation or shortness of breath. Gastrointestinal tract: No abdominal pain, nausea, vomiting, diarrhea or constipation. Genitourinary:  No burning on micturition. No hesitancy, urgency, frequency or discoloration of urine. Locomotor:  See HPI. Neuropsychiatric:  No referable complaints. GENERAL PHYSICAL EXAM:     Vitals: BP (!) 142/89   Pulse 80   Temp 97.3 °F (36.3 °C) (Oral)   Resp 20   Ht 5' 10\" (1.778 m)   Wt 234 lb (106.1 kg)   SpO2 99%   BMI 33.58 kg/m²  Body mass index is 33.58 kg/m². GENERAL APPEARANCE:   Osbaldo Mahoney is 61 y.o.,  male, mildly obese, nourished, conscious, alert. Does not appear to be distress or pain at this time. SKIN:  Warm, dry, no cyanosis or jaundice. HEAD:  Normocephalic, atraumatic, no swelling or tenderness. EYES:  Wears glasses. Pupils equal, reactive to light. EARS:  No discharge, no marked hearing loss. NOSE:  No rhinorrhea, epistaxis or septal deformity. THROAT:  Not congested. No ulceration bleeding or discharge. NECK:  No stiffness, trachea central.                 CHEST:  Symmetrical and equal on expansion. HEART:  RRR S1 > S2. No audible murmurs or gallops. LUNGS:  Equal on expansion, normal breath sounds. No wheezing, rhonchi or rales. ABDOMEN:  Obese. Soft on palpation. No localized tenderness. No guarding or rigidity. No palpable organomegaly. LYMPHATICS:  No palpable cervical lymphadenopathy. LOCOMOTOR, BACK AND SPINE:  No tenderness or deformities. EXTREMITIES:  Tinel's and Phalen's positive on left.  Tenderness to palpation of left dorsal wrist. Symmetrical, no pedal edema.   strength diminished on left. Right wrist w/p carpal tunnel release. No calf tenderness. No discoloration or ulcerations. NEUROLOGIC:  The patient is conscious, alert, oriented, No apparent focal sensory or motor deficits.                                                                             PROVISIONAL DIAGNOSES / SURGERY:      Left Carpal Tunnel Syndrome  Left Carpal Tunnel Release     Patient Active Problem List    Diagnosis Date Noted    Vitamin D deficiency 09/20/2017    History of hepatitis C 09/11/2017    Ganglion cyst 05/31/2017    Carpal tunnel syndrome of right wrist 05/31/2017    Tinnitus 03/23/2017    Eustachian tube dysfunction 03/23/2017    Lumbar radiculitis 11/08/2016    Lumbar disc herniation 11/08/2016    Gynecomastia, male 10/26/2016    Depression 10/13/2016    Grief 10/13/2016    Chronic midline low back pain without sciatica 10/06/2016    DDD (degenerative disc disease), lumbar 10/06/2016    Hepatic cirrhosis ([address] 09/15/2016    Insomnia 09/14/2016    Cirrhosis ([address]     Cholelithiasis     Weight loss 08/16/2016    Itching 08/16/2016    Hep C w/o coma, chronic (HCC)     Fatty liver     Calculus of gallbladder without cholecystitis 08/10/2016    Ascites due to alcoholic hepatitis     Acute hepatitis C virus infection without hepatic coma     Chronic viral hepatitis B without delta agent and without coma ([address] 98/02/6454    Alcoholic cirrhosis of liver with ascites (HCC)     Ascites     Hypomagnesemia     Alcohol withdrawal ([address] 07/20/2016    Hyponatremia 07/20/2016    Cervical radicular pain 01/04/2016    Tubular adenoma of colon 01/01/2016    History of colon polyps 01/01/2016    Back pain, chronic 04/19/2012    Hearing difficulty 04/19/2012    GERD (gastroesophageal reflux disease) 04/19/2012           John Faust NP on 10/17/2017 at 3:52 PM

## 2017-11-10 ENCOUNTER — OFFICE VISIT (OUTPATIENT)
Dept: ORTHOPEDIC SURGERY | Age: 57
End: 2017-11-10

## 2017-11-10 DIAGNOSIS — Z98.890 STATUS POST CARPAL TUNNEL RELEASE: ICD-10-CM

## 2017-11-10 DIAGNOSIS — G56.02 CARPAL TUNNEL SYNDROME OF LEFT WRIST: Primary | ICD-10-CM

## 2017-11-10 PROCEDURE — 99024 POSTOP FOLLOW-UP VISIT: CPT | Performed by: ORTHOPAEDIC SURGERY

## 2017-11-10 NOTE — PROGRESS NOTES
SURGICAL HISTORY  11/21/2016    Bilateral Lumbar CACHORRO L4-L5 injections    OTHER SURGICAL HISTORY  12/19/2016    lumbar steroid injection    ID REVISE MEDIAN N/CARPAL TUNNEL SURG Right 8/29/2017    CARPAL TUNNEL RELEASE RIGHT performed by Nilam Overton MD at [address] N/CARPAL TUNNEL SURG Left 10/31/2017    CARPAL TUNNEL RELEASE performed by Nilam Overton MD at [address]     Family History   Problem Relation Age of Onset    Cancer Mother      pancreatic    Cancer Father      bone    Diabetes Sister     Asthma Brother          No orders of the defined types were placed in this encounter. 1. Carpal tunnel syndrome of left wrist    2. Status post carpal tunnel release        Nilam Overton MD    Please note that this chart was generated using voice recognition Dragon dictation software. Although every effort was made to ensure the accuracy of this automated transcription, some errors in transcription may have occurred.

## 2017-12-04 ENCOUNTER — OFFICE VISIT (OUTPATIENT)
Dept: FAMILY MEDICINE CLINIC | Age: 57
End: 2017-12-04
Payer: MEDICARE

## 2017-12-04 VITALS
SYSTOLIC BLOOD PRESSURE: 124 MMHG | HEART RATE: 88 BPM | TEMPERATURE: 97.4 F | BODY MASS INDEX: 34.58 KG/M2 | DIASTOLIC BLOOD PRESSURE: 70 MMHG | WEIGHT: 241 LBS | OXYGEN SATURATION: 99 %

## 2017-12-04 DIAGNOSIS — F32.89 OTHER DEPRESSION: ICD-10-CM

## 2017-12-04 DIAGNOSIS — M79.672 FOOT PAIN, LEFT: Primary | ICD-10-CM

## 2017-12-04 DIAGNOSIS — Z12.11 SCREENING FOR COLON CANCER: ICD-10-CM

## 2017-12-04 DIAGNOSIS — G47.00 INSOMNIA, UNSPECIFIED TYPE: ICD-10-CM

## 2017-12-04 PROCEDURE — 3017F COLORECTAL CA SCREEN DOC REV: CPT | Performed by: NURSE PRACTITIONER

## 2017-12-04 PROCEDURE — 99214 OFFICE O/P EST MOD 30 MIN: CPT | Performed by: NURSE PRACTITIONER

## 2017-12-04 PROCEDURE — G8484 FLU IMMUNIZE NO ADMIN: HCPCS | Performed by: NURSE PRACTITIONER

## 2017-12-04 PROCEDURE — G8427 DOCREV CUR MEDS BY ELIG CLIN: HCPCS | Performed by: NURSE PRACTITIONER

## 2017-12-04 PROCEDURE — G8417 CALC BMI ABV UP PARAM F/U: HCPCS | Performed by: NURSE PRACTITIONER

## 2017-12-04 PROCEDURE — 1036F TOBACCO NON-USER: CPT | Performed by: NURSE PRACTITIONER

## 2017-12-04 RX ORDER — FUROSEMIDE 40 MG/1
40 TABLET ORAL DAILY
Qty: 90 TABLET | Refills: 1 | Status: CANCELLED | OUTPATIENT
Start: 2017-12-04

## 2017-12-04 RX ORDER — ZOLPIDEM TARTRATE 5 MG/1
TABLET ORAL
Qty: 30 TABLET | Refills: 2 | Status: SHIPPED | OUTPATIENT
Start: 2017-12-04 | End: 2017-12-04 | Stop reason: SDUPTHER

## 2017-12-04 RX ORDER — BUPROPION HYDROCHLORIDE 150 MG/1
150 TABLET ORAL EVERY MORNING
Qty: 30 TABLET | Refills: 3 | Status: SHIPPED | OUTPATIENT
Start: 2017-12-04 | End: 2017-12-04 | Stop reason: SDUPTHER

## 2017-12-04 RX ORDER — BUPROPION HYDROCHLORIDE 150 MG/1
150 TABLET ORAL EVERY MORNING
Qty: 30 TABLET | Refills: 3 | Status: SHIPPED | OUTPATIENT
Start: 2017-12-04 | End: 2018-01-04 | Stop reason: SINTOL

## 2017-12-04 RX ORDER — DICLOFENAC SODIUM 75 MG/1
75 TABLET, DELAYED RELEASE ORAL 2 TIMES DAILY
Qty: 180 TABLET | Refills: 1 | Status: CANCELLED | OUTPATIENT
Start: 2017-12-04

## 2017-12-04 RX ORDER — ZOLPIDEM TARTRATE 5 MG/1
TABLET ORAL
Qty: 30 TABLET | Refills: 2 | Status: SHIPPED | OUTPATIENT
Start: 2017-12-04 | End: 2018-03-07 | Stop reason: SDUPTHER

## 2017-12-04 RX ORDER — OMEPRAZOLE 20 MG/1
20 CAPSULE, DELAYED RELEASE ORAL DAILY
Refills: 0 | COMMUNITY
Start: 2017-11-13 | End: 2020-11-03 | Stop reason: ALTCHOICE

## 2017-12-04 ASSESSMENT — ENCOUNTER SYMPTOMS
ABDOMINAL PAIN: 0
CONSTIPATION: 0
DIARRHEA: 0
EYES NEGATIVE: 1
RESPIRATORY NEGATIVE: 1
GASTROINTESTINAL NEGATIVE: 1
SHORTNESS OF BREATH: 0
COUGH: 0
ALLERGIC/IMMUNOLOGIC NEGATIVE: 1
WHEEZING: 0

## 2017-12-04 NOTE — PROGRESS NOTES
[address] [facility] [address]  Dept: [phone]  Dept Fax: [phone]      Wayne Clarke is a 61 y.o. male who presents today for his medical conditions/complaints as noted below. Wayne Clarke is c/o of 6 Month Follow-Up and Medication Refill            HPI:     HPI  HTN- BP is well controlled today. Compliant with taking medications. Denies chest pain, dyspnea, edema, or HA. He is having pain in his right wrist. He has had surgery for carpal tunnel and the pain is less than prior to the surgery but is bothersome. He has had the left wrist done and it is healing well. He is feeling very depressed, he is not wanting to get out of bed. He has never been like this in the past. He has tried celexa in the past. He did not get much benefit from this. He does have a relationship with his ex wife and she looks after him. His dog gives him pleasure. Otherwise he does not feel that he has anything to look forward to.      No results found for: LABA1C          ( goal A1C is < 7)   No results found for: LABMICR  LDL Cholesterol (mg/dL)   Date Value   03/23/2017 87   07/31/2015 140 (H)   04/19/2012 132 (H)       (goal LDL is <100)   AST (U/L)   Date Value   08/17/2017 28     ALT (U/L)   Date Value   08/17/2017 18     BUN (mg/dL)   Date Value   10/17/2017 15     BP Readings from Last 3 Encounters:   12/04/17 124/70   10/31/17 (!) 151/92   10/31/17 126/74          (goal 120/80)    Past Medical History:   Diagnosis Date    Alcohol abuse     6-12 beers a day; quit drinking July 2016    Arthritis     Back pain, chronic     dr. Gabriela Aaron, orthopedic, every 3-4 months, gets steroid injection    BPH (benign prostatic hypertrophy)     Cholelithiasis     [facility]     DDD (degenerative disc disease), lumbar     Fatty liver     GERD (gastroesophageal reflux disease)     Hep C w/o coma, chronic ([address]     History of colon polyps 2016   [facility] (hard of hearing)     Stomach ulcer     hx of    Tobacco abuse     Tubular adenoma of colon 2016    Wears glasses       Past Surgical History:   Procedure Laterality Date    BUNIONECTOMY      twice on right side    BUNIONECTOMY Left     CARPAL TUNNEL RELEASE Right     COLONOSCOPY      at age 35    COLONOSCOPY  10/05/2016    polyps-pathology tubular adenoma, and abnormal looking mucosa right colon-pathology-tubular adenoma    ENDOSCOPY, COLON, DIAGNOSTIC      EGD    KNEE SURGERY Left     cyst removed    NASAL SEPTUM SURGERY      OTHER SURGICAL HISTORY  01/04/16    steroid injection C7 T1    OTHER SURGICAL HISTORY  11/21/2016    Bilateral Lumbar CACHORRO L4-L5 injections    OTHER SURGICAL HISTORY  12/19/2016    lumbar steroid injection    [place] REVISE MEDIAN N/CARPAL TUNNEL SURG Right 8/29/2017    CARPAL TUNNEL RELEASE RIGHT performed by Jeanne Montague MD at [address] N/CARPAL TUNNEL SURG Left 10/31/2017    CARPAL TUNNEL RELEASE performed by Jeanne Montague MD at [address] History   Problem Relation Age of Onset    Cancer Mother      pancreatic    Cancer Father      bone    Diabetes Sister     Asthma Brother        Social History   Substance Use Topics    Smoking status: Former Smoker     Packs/day: 1.00     Years: 45.00     Quit date: 1/17/2017    Smokeless tobacco: Never Used    Alcohol use No      Comment: 7/17/2016 he stopped drinking      Current Outpatient Prescriptions   Medication Sig Dispense Refill    zolpidem (AMBIEN) 5 MG tablet take 1 tablet by mouth NIGHTLY if needed for sleep.  30 tablet 2    buPROPion (WELLBUTRIN XL) 150 MG extended release tablet Take 1 tablet by mouth every morning 30 tablet 3    ergocalciferol (DRISDOL) 00646 units capsule Take 1 capsule by mouth once a week 4 capsule 5    furosemide (LASIX) 40 MG tablet Take 1 tablet by mouth daily 60 tablet 5    tamsulosin (FLOMAX) 0.4 MG capsule Take 1 capsule by mouth daily 30 capsule 11    omeprazole (PRILOSEC) 20 MG delayed release capsule   0     No current facility-administered medications for this visit. No Known Allergies    Health Maintenance   Topic Date Due    Smoker: low dose lung CT screening  08/15/2014    Colon cancer screen colonoscopy  06/04/2018 (Originally 10/5/2017)    HIV screen  12/04/2018 (Originally 8/15/1974)    Diabetes screen  03/16/2020    Lipid screen  03/23/2022    DTaP/Tdap/Td vaccine (2 - Td) 04/06/2027    Flu vaccine  Completed    Pneumococcal med risk  Completed          Review of Systems   Constitutional: Positive for fatigue. Negative for diaphoresis and fever. Eyes: Negative. Respiratory: Negative. Negative for cough, shortness of breath and wheezing. Cardiovascular: Negative. Negative for chest pain and palpitations. Gastrointestinal: Negative. Negative for abdominal pain, constipation and diarrhea. Endocrine: Negative. Negative for cold intolerance and heat intolerance. Genitourinary: Negative. Negative for difficulty urinating and urgency. Musculoskeletal: Positive for arthralgias. Skin: Negative. Negative for pallor and rash. Allergic/Immunologic: Negative. Neurological: Negative for headaches. Hematological: Negative. Psychiatric/Behavioral: Negative for sleep disturbance. The patient is not nervous/anxious. Objective:     /70 (Site: Left Arm, Position: Sitting, Cuff Size: Large Adult)   Pulse 88   Temp 97.4 °F (36.3 °C) (Oral)   Wt 241 lb (109.3 kg)   SpO2 99%   BMI 34.58 kg/m²   Physical Exam   Constitutional: He is oriented to person, place, and time. Vital signs are normal. He appears well-developed and well-nourished. HENT:   Head: Normocephalic and atraumatic. Neck: Normal range of motion. Cardiovascular: Normal rate, regular rhythm and normal heart sounds. Exam reveals no gallop and no friction rub. No murmur heard.   Pulmonary/Chest: Effort normal and breath sounds normal. No respiratory distress. He has no wheezes. He has no rales. Musculoskeletal: Normal range of motion. He exhibits no edema. Right wrist: He exhibits tenderness and crepitus. Left wrist: He exhibits tenderness. Feet:    Neurological: He is alert and oriented to person, place, and time. Skin: Skin is warm and dry. No rash noted. No erythema. Psychiatric: He has a normal mood and affect. His behavior is normal. Judgment and thought content normal.   Vitals reviewed. Assessment:      1. Foot pain, left    2. Insomnia, unspecified type    3. Other depression    4. Screening for colon cancer        Plan:     1. Insomnia, unspecified type  Uses often but not every night.   - zolpidem (AMBIEN) 5 MG tablet; take 1 tablet by mouth NIGHTLY if needed for sleep. Dispense: 30 tablet; Refill: 2    2. Foot pain, left  Pt choice of podiatry  - [facility] Fredy Roque DPM    3. Other depression  Will start for worsening depression  - buPROPion (WELLBUTRIN XL) 150 MG extended release tablet; Take 1 tablet by mouth every morning  Dispense: 30 tablet; Refill: 3    4. Screening for colon cancer    - POCT Fecal Immunochemical Test (FIT); Future      Orders Placed This Encounter   Procedures    [address] - Fredy Roqeu DPM     Referral Priority:   Routine     Referral Type:   Consult for Advice and Opinion     Referral Reason:   Specialty Services Required     Referred to Provider:   Rosie Pillai DPM     Requested Specialty:   Podiatry     Number of Visits Requested:   1    POCT Fecal Immunochemical Test (FIT)     Standing Status:   Future     Standing Expiration Date:   12/4/2018       Return in about 1 month (around 1/4/2018) for depression. Patient given educational materials - see patient instructions. Discussed use, benefit, and side effects of prescribed medications. All patient questions answered. Pt voiced understanding. Reviewed health maintenance. Instructed to continue current medications, diet and exercise. Patient agreed with treatment plan. Follow up as directed.      Electronically signed by Cat Badillo NP on 12/4/2017

## 2017-12-04 NOTE — PROGRESS NOTES
After talking to Olvin he does not want to go through another colonoscopy. I gave him a fit kit and explained to him what it was for and he agreed to do it. I told him if it came back positive that he will have to see GI and they may then do the colonoscopy and he was ok with this!

## 2018-01-04 ENCOUNTER — OFFICE VISIT (OUTPATIENT)
Dept: FAMILY MEDICINE CLINIC | Age: 58
End: 2018-01-04

## 2018-01-04 ENCOUNTER — TELEPHONE (OUTPATIENT)
Dept: FAMILY MEDICINE CLINIC | Age: 58
End: 2018-01-04

## 2018-01-04 VITALS
TEMPERATURE: 98 F | HEART RATE: 70 BPM | BODY MASS INDEX: 33.72 KG/M2 | OXYGEN SATURATION: 98 % | DIASTOLIC BLOOD PRESSURE: 80 MMHG | WEIGHT: 235 LBS | SYSTOLIC BLOOD PRESSURE: 132 MMHG

## 2018-01-04 DIAGNOSIS — K70.30 ALCOHOLIC CIRRHOSIS OF LIVER WITHOUT ASCITES (HCC): ICD-10-CM

## 2018-01-04 DIAGNOSIS — F10.21 ALCOHOLISM IN REMISSION (HCC): ICD-10-CM

## 2018-01-04 DIAGNOSIS — F33.41 MAJOR DEPRESSIVE DISORDER, RECURRENT, IN PARTIAL REMISSION (HCC): ICD-10-CM

## 2018-01-04 DIAGNOSIS — F32.89 OTHER DEPRESSION: Primary | ICD-10-CM

## 2018-01-04 DIAGNOSIS — B18.2 HEPATITIS C VIRUS CARRIER STATE (HCC): ICD-10-CM

## 2018-01-04 DIAGNOSIS — F41.9 ANXIETY: ICD-10-CM

## 2018-01-04 PROCEDURE — G8484 FLU IMMUNIZE NO ADMIN: HCPCS | Performed by: NURSE PRACTITIONER

## 2018-01-04 PROCEDURE — 99214 OFFICE O/P EST MOD 30 MIN: CPT | Performed by: NURSE PRACTITIONER

## 2018-01-04 PROCEDURE — G8427 DOCREV CUR MEDS BY ELIG CLIN: HCPCS | Performed by: NURSE PRACTITIONER

## 2018-01-04 PROCEDURE — 3017F COLORECTAL CA SCREEN DOC REV: CPT | Performed by: NURSE PRACTITIONER

## 2018-01-04 PROCEDURE — 1036F TOBACCO NON-USER: CPT | Performed by: NURSE PRACTITIONER

## 2018-01-04 PROCEDURE — G8417 CALC BMI ABV UP PARAM F/U: HCPCS | Performed by: NURSE PRACTITIONER

## 2018-01-04 RX ORDER — HYDROXYZINE PAMOATE 25 MG/1
25 CAPSULE ORAL 3 TIMES DAILY PRN
Qty: 60 CAPSULE | Refills: 1 | Status: SHIPPED | OUTPATIENT
Start: 2018-01-04 | End: 2018-02-19 | Stop reason: SDUPTHER

## 2018-01-04 RX ORDER — ESCITALOPRAM OXALATE 10 MG/1
10 TABLET ORAL DAILY
Qty: 30 TABLET | Refills: 3 | Status: SHIPPED | OUTPATIENT
Start: 2018-01-04 | End: 2018-05-07 | Stop reason: SDUPTHER

## 2018-01-04 ASSESSMENT — ENCOUNTER SYMPTOMS
DIARRHEA: 0
SHORTNESS OF BREATH: 0
COUGH: 0
CONSTIPATION: 0
WHEEZING: 0
EYES NEGATIVE: 1
ALLERGIC/IMMUNOLOGIC NEGATIVE: 1
ABDOMINAL PAIN: 0
RESPIRATORY NEGATIVE: 1
GASTROINTESTINAL NEGATIVE: 1

## 2018-01-04 NOTE — PROGRESS NOTES
Patient is present for a 1 month follow up for depression. Patient is not stable. Patient has been having a lot of panic attacks. Night time is the worse. Medications and pharmacy reviewed with patient. Patient is not sleeping well with anxiety. Patient is under a lot of stress currently. Loss of appetite. Visit Information    Have you changed or started any medications since your last visit including any over-the-counter medicines, vitamins, or herbal medicines? no   Have you stopped taking any of your medications? Is so, why? -  no  Are you having any side effects from any of your medications? - no    Have you seen any other physician or provider since your last visit?  no   Have you had any other diagnostic tests since your last visit?  no   Have you been seen in the emergency room and/or had an admission in a hospital since we last saw you?  no   Have you had your routine dental cleaning in the past 6 months?  no     Do you have an active [employer] account? If no, what is the barrier?   Yes    Patient Care Team:  Mp Burt NP as PCP - General (Certified Nurse Practitioner)    Medical History Review  Past Medical, Family, and Social History reviewed and does contribute to the patient presenting condition    Health Maintenance   Topic Date Due    Potassium monitoring  1960    Creatinine monitoring  1960    Smoker: low dose lung CT screening  08/15/2014    Colon cancer screen colonoscopy  06/04/2018 (Originally 10/5/2017)    HIV screen  12/04/2018 (Originally 8/15/1974)    Diabetes screen  03/16/2020    Lipid screen  03/23/2022    DTaP/Tdap/Td vaccine (2 - Td) 04/06/2027    Flu vaccine  Completed    Pneumococcal med risk  Completed
Plan:      No orders of the defined types were placed in this encounter. 1. Other depression  2. Major depressive disorder, recurrent, in partial remission ([facility]-  Agreeable to try lexapro  - escitalopram (LEXAPRO) 10 MG tablet; Take 1 tablet by mouth daily  Dispense: 30 tablet; Refill: 3  3. Hepatitis C virus carrier state ([facility]  4. Alcoholic cirrhosis of liver without ascites ([facility]  5. Alcoholism in remission ([facility]  6. Anxiety  Will add hydroxyzine for prn anxiety  - hydrOXYzine (VISTARIL) 25 MG capsule; Take 1 capsule by mouth 3 times daily as needed for Anxiety  Dispense: 60 capsule; Refill: 1        No Follow-up on file. Patient given educational materials - see patient instructions. Discussed use, benefit, and side effects of prescribed medications. All patient questions answered. Pt voiced understanding. Reviewed health maintenance. Instructed to continue current medications, diet and exercise. Patient agreed with treatment plan. Follow up as directed.      Electronically signed by Jen Walls NP on 1/4/2018

## 2018-02-06 ENCOUNTER — OFFICE VISIT (OUTPATIENT)
Dept: UROLOGY | Age: 58
End: 2018-02-06

## 2018-02-06 VITALS
BODY MASS INDEX: 33.79 KG/M2 | SYSTOLIC BLOOD PRESSURE: 135 MMHG | TEMPERATURE: 98.2 F | WEIGHT: 236 LBS | HEIGHT: 70 IN | HEART RATE: 79 BPM | DIASTOLIC BLOOD PRESSURE: 87 MMHG

## 2018-02-06 DIAGNOSIS — R35.0 FREQUENCY OF URINATION: Primary | ICD-10-CM

## 2018-02-06 DIAGNOSIS — R39.15 URINARY URGENCY: ICD-10-CM

## 2018-02-06 DIAGNOSIS — Z12.5 SCREENING FOR PROSTATE CANCER: ICD-10-CM

## 2018-02-06 PROCEDURE — 1036F TOBACCO NON-USER: CPT | Performed by: NURSE PRACTITIONER

## 2018-02-06 PROCEDURE — 99213 OFFICE O/P EST LOW 20 MIN: CPT | Performed by: NURSE PRACTITIONER

## 2018-02-06 PROCEDURE — G8484 FLU IMMUNIZE NO ADMIN: HCPCS | Performed by: NURSE PRACTITIONER

## 2018-02-06 PROCEDURE — G8417 CALC BMI ABV UP PARAM F/U: HCPCS | Performed by: NURSE PRACTITIONER

## 2018-02-06 PROCEDURE — 3017F COLORECTAL CA SCREEN DOC REV: CPT | Performed by: NURSE PRACTITIONER

## 2018-02-06 PROCEDURE — G8427 DOCREV CUR MEDS BY ELIG CLIN: HCPCS | Performed by: NURSE PRACTITIONER

## 2018-02-06 RX ORDER — HYDROXYZINE PAMOATE 25 MG/1
25 CAPSULE ORAL 3 TIMES DAILY PRN
COMMUNITY
End: 2018-02-19

## 2018-02-06 ASSESSMENT — ENCOUNTER SYMPTOMS
WHEEZING: 0
ABDOMINAL PAIN: 0
NAUSEA: 0
COUGH: 0
SHORTNESS OF BREATH: 0
BACK PAIN: 0
EYE REDNESS: 0
EYE PAIN: 0
COLOR CHANGE: 0
VOMITING: 0

## 2018-02-06 NOTE — PROGRESS NOTES
RELEASE performed by Vijaya Burt MD at [facility] History   Problem Relation Age of Onset    Cancer Mother      pancreatic    Cancer Father      bone    Diabetes Sister     Asthma Brother      Outpatient Prescriptions Marked as Taking for the 2/6/18 encounter (Office Visit) with Ivelisse Zarate CNP   Medication Sig Dispense Refill    hydrOXYzine (VISTARIL) 25 MG capsule Take 25 mg by mouth 3 times daily as needed for Itching or Anxiety       escitalopram (LEXAPRO) 10 MG tablet Take 1 tablet by mouth daily 30 tablet 3    omeprazole (PRILOSEC) 20 MG delayed release capsule Take 20 mg by mouth Daily   0    zolpidem (AMBIEN) 5 MG tablet take 1 tablet by mouth NIGHTLY if needed for sleep. 30 tablet 2    ergocalciferol (DRISDOL) 70298 units capsule Take 1 capsule by mouth once a week 4 capsule 5    furosemide (LASIX) 40 MG tablet Take 1 tablet by mouth daily 60 tablet 5    tamsulosin (FLOMAX) 0.4 MG capsule Take 1 capsule by mouth daily 30 capsule 11       Review of patient's allergies indicates no known allergies. History   Smoking Status    Former Smoker    Packs/day: 1.00    Years: 45.00    Quit date: 1/17/2017   Smokeless Tobacco    Never Used     (If patient a smoker, smoking cessation counseling offered)    History   Alcohol Use No     Comment: 7/17/2016 he stopped drinking       REVIEW OF SYSTEMS:  Review of Systems   Constitutional: Negative for activity change, chills and fever. Eyes: Negative for pain, redness and visual disturbance. Respiratory: Negative for cough, shortness of breath and wheezing. Cardiovascular: Negative for chest pain and leg swelling. Gastrointestinal: Negative for abdominal pain, nausea and vomiting. Genitourinary: Negative for difficulty urinating, discharge, dysuria, flank pain, frequency, hematuria, scrotal swelling and testicular pain. Musculoskeletal: Negative for back pain, joint swelling and myalgias.    Skin: Negative for color change and rash.   Neurological: Negative for dizziness, tremors and numbness. Hematological: Negative for adenopathy. Does not bruise/bleed easily. Physical Exam:      Vitals:    02/06/18 1355   BP: 135/87   Pulse: 79   Temp: 98.2 °F (36.8 °C)     Body mass index is 33.86 kg/m². Patient is a 61 y.o. male in no acute distress and alert and oriented to person, place and time. Physical Exam  Constitutional: Patient in no acute distress. Neuro: Alert and oriented to person, place and time. Psych: Mood normal, affect normal  Skin: No rash noted  HEENT: Head: Normocephalic and atraumatic  Conjunctivae and EOM are normal. Pupils are equal, round  Nose: Normal  Right External Ear: Normal; Left External Ear: Normal  Mouth: Mucosa Moist  Neck: Supple  Lungs: Respiratory effort is normal  Cardiovascular: strong and regular. Abdomen: Soft, non-tender, non-distended  Bladder non-tender and not distended. Musculoskeletal: Normal gait and station      Assessment and Plan      1. Frequency of urination    2. Urinary urgency    3. Screening for prostate cancer           Plan:    Stop Flomax for 1 week- if urinary symptoms worsen go back on Flomax. If urinary do not worsen you can remain off. You have decreased a large amount of pop in your diet and this may have been contributing to your symptoms. Follow up in 6 months with a PSA    Call with questions or concerns sooner  No Follow-up on file. Prescriptions Ordered:  No orders of the defined types were placed in this encounter.      Orders Placed:  Orders Placed This Encounter   Procedures    PSA Screening     Standing Status:   Future     Standing Expiration Date:   2/6/2019          Loraine Michel CNP

## 2018-02-07 ENCOUNTER — OFFICE VISIT (OUTPATIENT)
Dept: FAMILY MEDICINE CLINIC | Age: 58
End: 2018-02-07

## 2018-02-07 VITALS
HEART RATE: 74 BPM | BODY MASS INDEX: 34.01 KG/M2 | OXYGEN SATURATION: 98 % | WEIGHT: 237 LBS | SYSTOLIC BLOOD PRESSURE: 120 MMHG | TEMPERATURE: 97.1 F | DIASTOLIC BLOOD PRESSURE: 80 MMHG | RESPIRATION RATE: 16 BRPM

## 2018-02-07 DIAGNOSIS — G47.00 INSOMNIA, UNSPECIFIED TYPE: ICD-10-CM

## 2018-02-07 DIAGNOSIS — F32.89 OTHER DEPRESSION: Primary | ICD-10-CM

## 2018-02-07 PROCEDURE — 1036F TOBACCO NON-USER: CPT | Performed by: NURSE PRACTITIONER

## 2018-02-07 PROCEDURE — G8417 CALC BMI ABV UP PARAM F/U: HCPCS | Performed by: NURSE PRACTITIONER

## 2018-02-07 PROCEDURE — G8484 FLU IMMUNIZE NO ADMIN: HCPCS | Performed by: NURSE PRACTITIONER

## 2018-02-07 PROCEDURE — 99213 OFFICE O/P EST LOW 20 MIN: CPT | Performed by: NURSE PRACTITIONER

## 2018-02-07 PROCEDURE — 3017F COLORECTAL CA SCREEN DOC REV: CPT | Performed by: NURSE PRACTITIONER

## 2018-02-07 PROCEDURE — G8427 DOCREV CUR MEDS BY ELIG CLIN: HCPCS | Performed by: NURSE PRACTITIONER

## 2018-02-07 ASSESSMENT — ENCOUNTER SYMPTOMS
COUGH: 0
ABDOMINAL PAIN: 0
EYES NEGATIVE: 1
WHEEZING: 0
RESPIRATORY NEGATIVE: 1
CONSTIPATION: 0
SHORTNESS OF BREATH: 0
ALLERGIC/IMMUNOLOGIC NEGATIVE: 1
DIARRHEA: 0
GASTROINTESTINAL NEGATIVE: 1

## 2018-02-07 NOTE — PROGRESS NOTES
Procedure Laterality Date    BUNIONECTOMY      twice on right side    BUNIONECTOMY Left     CARPAL TUNNEL RELEASE Right     COLONOSCOPY      at age 35    COLONOSCOPY  10/05/2016    polyps-pathology tubular adenoma, and abnormal looking mucosa right colon-pathology-tubular adenoma    ENDOSCOPY, COLON, DIAGNOSTIC      EGD    KNEE SURGERY Left     cyst removed    NASAL SEPTUM SURGERY      OTHER SURGICAL HISTORY  01/04/16    steroid injection C7 T1    OTHER SURGICAL HISTORY  11/21/2016    Bilateral Lumbar CACHORRO L4-L5 injections    OTHER SURGICAL HISTORY  12/19/2016    lumbar steroid injection    [place] REVISE MEDIAN N/CARPAL TUNNEL SURG Right 8/29/2017    CARPAL TUNNEL RELEASE RIGHT performed by Michael Yu MD at [address] N/CARPAL TUNNEL SURG Left 10/31/2017    CARPAL TUNNEL RELEASE performed by Michael Yu MD at [address] History   Problem Relation Age of Onset    Cancer Mother      pancreatic    Cancer Father      bone    Diabetes Sister     Asthma Brother        Social History   Substance Use Topics    Smoking status: Former Smoker     Packs/day: 1.00     Years: 45.00     Quit date: 1/17/2017    Smokeless tobacco: Never Used    Alcohol use No      Comment: 7/17/2016 he stopped drinking      Current Outpatient Prescriptions   Medication Sig Dispense Refill    hydrOXYzine (VISTARIL) 25 MG capsule Take 25 mg by mouth 3 times daily as needed for Itching or Anxiety       escitalopram (LEXAPRO) 10 MG tablet Take 1 tablet by mouth daily 30 tablet 3    omeprazole (PRILOSEC) 20 MG delayed release capsule Take 20 mg by mouth Daily   0    zolpidem (AMBIEN) 5 MG tablet take 1 tablet by mouth NIGHTLY if needed for sleep.  30 tablet 2    ergocalciferol (DRISDOL) 04379 units capsule Take 1 capsule by mouth once a week 4 capsule 5    furosemide (LASIX) 40 MG tablet Take 1 tablet by mouth daily 60 tablet 5    tamsulosin (FLOMAX) 0.4 MG capsule Take 1 capsule by mouth daily

## 2018-02-13 DIAGNOSIS — Z12.11 SCREENING FOR COLON CANCER: ICD-10-CM

## 2018-02-13 LAB
CONTROL: POSITIVE
HEMOCCULT STL QL: NEGATIVE

## 2018-02-13 PROCEDURE — 82274 ASSAY TEST FOR BLOOD FECAL: CPT | Performed by: NURSE PRACTITIONER

## 2018-02-19 DIAGNOSIS — F41.9 ANXIETY: ICD-10-CM

## 2018-02-19 RX ORDER — HYDROXYZINE PAMOATE 25 MG/1
CAPSULE ORAL
Qty: 90 CAPSULE | Refills: 1 | Status: SHIPPED | OUTPATIENT
Start: 2018-02-19 | End: 2018-04-26 | Stop reason: SDUPTHER

## 2018-02-22 ENCOUNTER — OFFICE VISIT (OUTPATIENT)
Dept: ORTHOPEDIC SURGERY | Age: 58
End: 2018-02-22

## 2018-02-22 DIAGNOSIS — G56.01 CARPAL TUNNEL SYNDROME OF RIGHT WRIST: Primary | ICD-10-CM

## 2018-02-22 DIAGNOSIS — M25.531 RIGHT WRIST PAIN: ICD-10-CM

## 2018-02-22 PROCEDURE — 99213 OFFICE O/P EST LOW 20 MIN: CPT | Performed by: ORTHOPAEDIC SURGERY

## 2018-02-22 PROCEDURE — 3017F COLORECTAL CA SCREEN DOC REV: CPT | Performed by: ORTHOPAEDIC SURGERY

## 2018-02-22 PROCEDURE — G8484 FLU IMMUNIZE NO ADMIN: HCPCS | Performed by: ORTHOPAEDIC SURGERY

## 2018-02-22 PROCEDURE — G8417 CALC BMI ABV UP PARAM F/U: HCPCS | Performed by: ORTHOPAEDIC SURGERY

## 2018-02-22 PROCEDURE — G8427 DOCREV CUR MEDS BY ELIG CLIN: HCPCS | Performed by: ORTHOPAEDIC SURGERY

## 2018-02-22 PROCEDURE — 1036F TOBACCO NON-USER: CPT | Performed by: ORTHOPAEDIC SURGERY

## 2018-02-22 NOTE — PROGRESS NOTES
of    Tobacco abuse     Tubular adenoma of colon 2016    Wears glasses      Past Surgical History:   Procedure Laterality Date    BUNIONECTOMY      twice on right side    BUNIONECTOMY Left     CARPAL TUNNEL RELEASE Right     COLONOSCOPY      at age 35    COLONOSCOPY  10/05/2016    polyps-pathology tubular adenoma, and abnormal looking mucosa right colon-pathology-tubular adenoma    ENDOSCOPY, COLON, DIAGNOSTIC      EGD    KNEE SURGERY Left     cyst removed    NASAL SEPTUM SURGERY      OTHER SURGICAL HISTORY  01/04/16    steroid injection C7 T1    OTHER SURGICAL HISTORY  11/21/2016    Bilateral Lumbar CACHORRO L4-L5 injections    OTHER SURGICAL HISTORY  12/19/2016    lumbar steroid injection    [place] REVISE MEDIAN N/CARPAL TUNNEL SURG Right 8/29/2017    CARPAL TUNNEL RELEASE RIGHT performed by Stefanie Ingram MD at [address] N/CARPAL TUNNEL SURG Left 10/31/2017    CARPAL TUNNEL RELEASE performed by Stefanie Ingram MD at [facility] [place], operated by [facility] History   Problem Relation Age of Onset    Cancer Mother      pancreatic    Cancer Father      bone    Diabetes Sister     Asthma Brother            Orders Placed This Encounter   Procedures    XR WRIST RIGHT (2 VIEWS)     Standing Status:   Future     Number of Occurrences:   1     Standing Expiration Date:   2/22/2019    External Referral To Hand Surgery     Referral Priority:   Routine     Referral Type:   Consult for Advice and Opinion     Referral Reason:   Specialty Services Required     Referred to Provider:   Nahum Galindo MD     Requested Specialty:   Orthopedic Surgery     Number of Visits Requested:   1       1. Carpal tunnel syndrome of right wrist    2. Right wrist pain        Stefanie Ingram MD    Please note that this chart was generated using voice recognition Dragon dictation software. Although every effort was made to ensure the accuracy of this automated transcription, some errors in transcription may have occurred.

## 2018-02-26 ENCOUNTER — HOSPITAL ENCOUNTER (OUTPATIENT)
Dept: ULTRASOUND IMAGING | Age: 58
Discharge: HOME | End: 2018-02-28
Payer: MEDICARE

## 2018-02-26 DIAGNOSIS — B19.20 HEPATITIS C VIRUS INFECTION WITHOUT HEPATIC COMA, UNSPECIFIED CHRONICITY: ICD-10-CM

## 2018-02-26 PROCEDURE — 76705 ECHO EXAM OF ABDOMEN: CPT

## 2018-03-05 ENCOUNTER — TELEPHONE (OUTPATIENT)
Dept: FAMILY MEDICINE CLINIC | Age: 58
End: 2018-03-05

## 2018-03-05 DIAGNOSIS — G47.00 INSOMNIA, UNSPECIFIED TYPE: ICD-10-CM

## 2018-03-05 NOTE — TELEPHONE ENCOUNTER
Pt called and wants to know if Aminah Cardenas will refill his Ambien 5 mg tablets    Request a call back so he knows what's going on with it [phone]

## 2018-03-07 ENCOUNTER — TELEPHONE (OUTPATIENT)
Dept: GASTROENTEROLOGY | Age: 58
End: 2018-03-07

## 2018-03-07 DIAGNOSIS — G47.00 INSOMNIA, UNSPECIFIED TYPE: ICD-10-CM

## 2018-03-07 RX ORDER — ZOLPIDEM TARTRATE 5 MG/1
5 TABLET ORAL NIGHTLY PRN
Qty: 30 TABLET | Refills: 2 | Status: SHIPPED | OUTPATIENT
Start: 2018-03-07 | End: 2018-03-09 | Stop reason: SDUPTHER

## 2018-03-08 ENCOUNTER — HOSPITAL ENCOUNTER (OUTPATIENT)
Age: 58
Discharge: HOME | End: 2018-03-08
Payer: MEDICARE

## 2018-03-08 DIAGNOSIS — Z86.19 HISTORY OF HEPATITIS C: ICD-10-CM

## 2018-03-08 DIAGNOSIS — K75.9 HEPATITIS: ICD-10-CM

## 2018-03-08 LAB
ABSOLUTE EOS #: 0.1 K/UL (ref 0–0.4)
ABSOLUTE IMMATURE GRANULOCYTE: NORMAL K/UL (ref 0–0.3)
ABSOLUTE LYMPH #: 2 K/UL (ref 1–4.8)
ABSOLUTE MONO #: 0.6 K/UL (ref 0.1–1.3)
ALBUMIN SERPL-MCNC: 3.9 G/DL (ref 3.5–5.2)
ALBUMIN/GLOBULIN RATIO: ABNORMAL (ref 1–2.5)
ALP BLD-CCNC: 131 U/L (ref 40–129)
ALT SERPL-CCNC: 18 U/L (ref 5–41)
AST SERPL-CCNC: 36 U/L
BASOPHILS # BLD: 1 % (ref 0–2)
BASOPHILS ABSOLUTE: 0 K/UL (ref 0–0.2)
BILIRUB SERPL-MCNC: 1.16 MG/DL (ref 0.3–1.2)
BILIRUBIN DIRECT: 0.29 MG/DL
BILIRUBIN, INDIRECT: 0.87 MG/DL (ref 0–1)
DIFFERENTIAL TYPE: NORMAL
EOSINOPHILS RELATIVE PERCENT: 1 % (ref 0–4)
GLOBULIN: ABNORMAL G/DL (ref 1.5–3.8)
HCT VFR BLD CALC: 42.9 % (ref 41–53)
HEMOGLOBIN: 14.7 G/DL (ref 13.5–17.5)
IMMATURE GRANULOCYTES: NORMAL %
LYMPHOCYTES # BLD: 26 % (ref 24–44)
MCH RBC QN AUTO: 30.5 PG (ref 26–34)
MCHC RBC AUTO-ENTMCNC: 34.2 G/DL (ref 31–37)
MCV RBC AUTO: 89.3 FL (ref 80–100)
MONOCYTES # BLD: 7 % (ref 1–7)
NRBC AUTOMATED: NORMAL PER 100 WBC
PDW BLD-RTO: 13.1 % (ref 11.5–14.9)
PLATELET # BLD: 193 K/UL (ref 150–450)
PLATELET ESTIMATE: NORMAL
PMV BLD AUTO: 8.5 FL (ref 6–12)
RBC # BLD: 4.81 M/UL (ref 4.5–5.9)
RBC # BLD: NORMAL 10*6/UL
SEG NEUTROPHILS: 65 % (ref 36–66)
SEGMENTED NEUTROPHILS ABSOLUTE COUNT: 5.1 K/UL (ref 1.3–9.1)
TOTAL PROTEIN: 7.2 G/DL (ref 6.4–8.3)
WBC # BLD: 7.8 K/UL (ref 3.5–11)
WBC # BLD: NORMAL 10*3/UL

## 2018-03-08 PROCEDURE — 87522 HEPATITIS C REVRS TRNSCRPJ: CPT

## 2018-03-08 PROCEDURE — 80076 HEPATIC FUNCTION PANEL: CPT

## 2018-03-08 PROCEDURE — 85025 COMPLETE CBC W/AUTO DIFF WBC: CPT

## 2018-03-08 RX ORDER — ZOLPIDEM TARTRATE 5 MG/1
TABLET ORAL
Qty: 30 TABLET | Refills: 2 | OUTPATIENT
Start: 2018-03-08

## 2018-03-09 DIAGNOSIS — G47.00 INSOMNIA, UNSPECIFIED TYPE: ICD-10-CM

## 2018-03-09 NOTE — TELEPHONE ENCOUNTER
The medication was sent to the wrong pharmacy on 03/07/2018. Pt has no idea why this pharmacy is in his chart. Pt does not know who [facility]. Pt needs this script to go to his local pharmacy. Pt is aware Kallie Krause is out of the office until Monday.

## 2018-03-12 ENCOUNTER — OFFICE VISIT (OUTPATIENT)
Dept: GASTROENTEROLOGY | Age: 58
End: 2018-03-12

## 2018-03-12 VITALS
HEIGHT: 70 IN | BODY MASS INDEX: 33.93 KG/M2 | HEART RATE: 66 BPM | DIASTOLIC BLOOD PRESSURE: 95 MMHG | WEIGHT: 237 LBS | SYSTOLIC BLOOD PRESSURE: 156 MMHG | OXYGEN SATURATION: 98 %

## 2018-03-12 DIAGNOSIS — K70.31 ALCOHOLIC CIRRHOSIS OF LIVER WITH ASCITES (HCC): ICD-10-CM

## 2018-03-12 DIAGNOSIS — Z86.19 HISTORY OF HEPATITIS C: ICD-10-CM

## 2018-03-12 DIAGNOSIS — Z86.010 HISTORY OF COLON POLYPS: ICD-10-CM

## 2018-03-12 DIAGNOSIS — K21.9 GASTROESOPHAGEAL REFLUX DISEASE WITHOUT ESOPHAGITIS: Primary | ICD-10-CM

## 2018-03-12 DIAGNOSIS — K76.0 FATTY LIVER: ICD-10-CM

## 2018-03-12 PROCEDURE — G8427 DOCREV CUR MEDS BY ELIG CLIN: HCPCS | Performed by: INTERNAL MEDICINE

## 2018-03-12 PROCEDURE — 3017F COLORECTAL CA SCREEN DOC REV: CPT | Performed by: INTERNAL MEDICINE

## 2018-03-12 PROCEDURE — 99214 OFFICE O/P EST MOD 30 MIN: CPT | Performed by: INTERNAL MEDICINE

## 2018-03-12 PROCEDURE — G8482 FLU IMMUNIZE ORDER/ADMIN: HCPCS | Performed by: INTERNAL MEDICINE

## 2018-03-12 PROCEDURE — 1036F TOBACCO NON-USER: CPT | Performed by: INTERNAL MEDICINE

## 2018-03-12 PROCEDURE — G8417 CALC BMI ABV UP PARAM F/U: HCPCS | Performed by: INTERNAL MEDICINE

## 2018-03-12 RX ORDER — ZOLPIDEM TARTRATE 5 MG/1
5 TABLET ORAL NIGHTLY PRN
Qty: 30 TABLET | Refills: 2 | Status: SHIPPED | OUTPATIENT
Start: 2018-03-12 | End: 2018-04-11

## 2018-03-12 ASSESSMENT — ENCOUNTER SYMPTOMS
DIARRHEA: 0
VOMITING: 0
ABDOMINAL DISTENTION: 0
SINUS PRESSURE: 0
SINUS PAIN: 0
ANAL BLEEDING: 0
NAUSEA: 0
WHEEZING: 0
CHOKING: 0
BLOOD IN STOOL: 0
COUGH: 0
RECTAL PAIN: 0
TROUBLE SWALLOWING: 0
SHORTNESS OF BREATH: 0
ABDOMINAL PAIN: 0
ALLERGIC/IMMUNOLOGIC NEGATIVE: 1
CONSTIPATION: 0
SORE THROAT: 0
BACK PAIN: 1
FACIAL SWELLING: 0
GASTROINTESTINAL NEGATIVE: 1
RHINORRHEA: 0
RESPIRATORY NEGATIVE: 1
VOICE CHANGE: 0

## 2018-03-12 NOTE — PROGRESS NOTES
alert and oriented to person, place, and time. No cranial nerve deficit. Skin: Skin is warm and dry. No rash noted. No erythema. Psychiatric: Thought content normal.   Nursing note and vitals reviewed. Assessment:      1. Gastroesophageal reflux disease without esophagitis     2. Alcoholic cirrhosis of liver with ascites ([address]     3. Fatty liver     4. History of colon polyps  COLONOSCOPY W/ OR W/O BIOPSY   5. History of hepatitis C             Plan:      Patient is explained regarding the lab values and reassured. Discussed with the patient regarding cholelithiasis and advised to contact physician if he develops any symptoms. Given that he had abnormal mucosa of the right colon in 2016, he needs colonoscopy to evaluate the right colon. After discussion patient understood and agreed. The Endoscopic procedure was explained to the patient in detail  The prep and NPO were explained  All the Risks, Benefits, and Alternatives were explained  Risk of Bleeding, Perforation and Cardio Respiratory risks were explained  his questions were answered  The procedure has been scheduled with the  in the office  Patient was asked to give us a call for any questions  The patient has verbalized understanding and agreement to this plan.

## 2018-03-30 ENCOUNTER — HOSPITAL ENCOUNTER (OUTPATIENT)
Age: 58
Setting detail: OUTPATIENT SURGERY
Discharge: HOME | End: 2018-03-30
Attending: INTERNAL MEDICINE | Admitting: INTERNAL MEDICINE
Payer: MEDICARE

## 2018-03-30 VITALS
RESPIRATION RATE: 16 BRPM | WEIGHT: 230 LBS | HEART RATE: 77 BPM | OXYGEN SATURATION: 97 % | HEIGHT: 70 IN | DIASTOLIC BLOOD PRESSURE: 88 MMHG | SYSTOLIC BLOOD PRESSURE: 150 MMHG | BODY MASS INDEX: 32.93 KG/M2 | TEMPERATURE: 98.1 F

## 2018-03-30 PROCEDURE — 6360000002 HC RX W HCPCS: Performed by: INTERNAL MEDICINE

## 2018-03-30 PROCEDURE — 88305 TISSUE EXAM BY PATHOLOGIST: CPT

## 2018-03-30 PROCEDURE — 99152 MOD SED SAME PHYS/QHP 5/>YRS: CPT | Performed by: INTERNAL MEDICINE

## 2018-03-30 PROCEDURE — 2580000003 HC RX 258: Performed by: INTERNAL MEDICINE

## 2018-03-30 PROCEDURE — 3609010600 HC COLONOSCOPY POLYPECTOMY SNARE/COLD BIOPSY: Performed by: INTERNAL MEDICINE

## 2018-03-30 PROCEDURE — 99153 MOD SED SAME PHYS/QHP EA: CPT | Performed by: INTERNAL MEDICINE

## 2018-03-30 PROCEDURE — 7100000011 HC PHASE II RECOVERY - ADDTL 15 MIN: Performed by: INTERNAL MEDICINE

## 2018-03-30 PROCEDURE — 7100000010 HC PHASE II RECOVERY - FIRST 15 MIN: Performed by: INTERNAL MEDICINE

## 2018-03-30 RX ORDER — MIDAZOLAM HYDROCHLORIDE 1 MG/ML
INJECTION INTRAMUSCULAR; INTRAVENOUS PRN
Status: DISCONTINUED | OUTPATIENT
Start: 2018-03-30 | End: 2018-03-30 | Stop reason: HOSPADM

## 2018-03-30 RX ORDER — SODIUM CHLORIDE 9 MG/ML
INJECTION, SOLUTION INTRAVENOUS CONTINUOUS
Status: DISCONTINUED | OUTPATIENT
Start: 2018-03-30 | End: 2018-03-30 | Stop reason: HOSPADM

## 2018-03-30 RX ORDER — FENTANYL CITRATE 50 UG/ML
INJECTION, SOLUTION INTRAMUSCULAR; INTRAVENOUS PRN
Status: DISCONTINUED | OUTPATIENT
Start: 2018-03-30 | End: 2018-03-30 | Stop reason: HOSPADM

## 2018-03-30 RX ADMIN — SODIUM CHLORIDE: 9 INJECTION, SOLUTION INTRAVENOUS at 07:25

## 2018-03-30 ASSESSMENT — PAIN SCALES - GENERAL: PAINLEVEL_OUTOF10: 0

## 2018-03-30 ASSESSMENT — PAIN - FUNCTIONAL ASSESSMENT: PAIN_FUNCTIONAL_ASSESSMENT: 0-10

## 2018-04-02 LAB — SURGICAL PATHOLOGY REPORT: NORMAL

## 2018-04-11 ENCOUNTER — TELEPHONE (OUTPATIENT)
Dept: GASTROENTEROLOGY | Age: 58
End: 2018-04-11

## 2018-04-17 DIAGNOSIS — Z86.010 HISTORY OF COLON POLYPS: ICD-10-CM

## 2018-04-26 DIAGNOSIS — F41.9 ANXIETY: ICD-10-CM

## 2018-04-26 RX ORDER — HYDROXYZINE PAMOATE 25 MG/1
CAPSULE ORAL
Qty: 90 CAPSULE | Refills: 1 | Status: SHIPPED | OUTPATIENT
Start: 2018-04-26 | End: 2019-01-24 | Stop reason: SDUPTHER

## 2018-05-02 ENCOUNTER — OFFICE VISIT (OUTPATIENT)
Dept: GASTROENTEROLOGY | Age: 58
End: 2018-05-02

## 2018-05-02 VITALS
WEIGHT: 236 LBS | HEART RATE: 85 BPM | OXYGEN SATURATION: 95 % | HEIGHT: 70 IN | DIASTOLIC BLOOD PRESSURE: 93 MMHG | SYSTOLIC BLOOD PRESSURE: 150 MMHG | BODY MASS INDEX: 33.79 KG/M2

## 2018-05-02 DIAGNOSIS — K70.30 ALCOHOLIC CIRRHOSIS OF LIVER WITHOUT ASCITES (HCC): ICD-10-CM

## 2018-05-02 DIAGNOSIS — Z86.19 HISTORY OF HEPATITIS C: Primary | ICD-10-CM

## 2018-05-02 DIAGNOSIS — Z86.010 HISTORY OF COLON POLYPS: ICD-10-CM

## 2018-05-02 DIAGNOSIS — D12.6 TUBULAR ADENOMA OF COLON: ICD-10-CM

## 2018-05-02 DIAGNOSIS — K76.0 FATTY LIVER: ICD-10-CM

## 2018-05-02 PROCEDURE — 1036F TOBACCO NON-USER: CPT | Performed by: INTERNAL MEDICINE

## 2018-05-02 PROCEDURE — G8417 CALC BMI ABV UP PARAM F/U: HCPCS | Performed by: INTERNAL MEDICINE

## 2018-05-02 PROCEDURE — 99213 OFFICE O/P EST LOW 20 MIN: CPT | Performed by: INTERNAL MEDICINE

## 2018-05-02 PROCEDURE — G8427 DOCREV CUR MEDS BY ELIG CLIN: HCPCS | Performed by: INTERNAL MEDICINE

## 2018-05-02 PROCEDURE — 3017F COLORECTAL CA SCREEN DOC REV: CPT | Performed by: INTERNAL MEDICINE

## 2018-05-02 ASSESSMENT — ENCOUNTER SYMPTOMS
CONSTIPATION: 0
RHINORRHEA: 0
TROUBLE SWALLOWING: 0
SINUS PRESSURE: 0
RECTAL PAIN: 0
FACIAL SWELLING: 0
BLOOD IN STOOL: 0
SORE THROAT: 0
SHORTNESS OF BREATH: 0
ABDOMINAL DISTENTION: 0
RESPIRATORY NEGATIVE: 1
VOMITING: 0
GASTROINTESTINAL NEGATIVE: 1
ALLERGIC/IMMUNOLOGIC NEGATIVE: 1
WHEEZING: 0
DIARRHEA: 0
NAUSEA: 0
BACK PAIN: 1
SINUS PAIN: 0
VOICE CHANGE: 0
ABDOMINAL PAIN: 0
COUGH: 0
CHOKING: 0
ANAL BLEEDING: 0

## 2018-05-07 ENCOUNTER — OFFICE VISIT (OUTPATIENT)
Dept: FAMILY MEDICINE CLINIC | Age: 58
End: 2018-05-07

## 2018-05-07 VITALS
TEMPERATURE: 97.8 F | HEART RATE: 74 BPM | DIASTOLIC BLOOD PRESSURE: 80 MMHG | OXYGEN SATURATION: 98 % | SYSTOLIC BLOOD PRESSURE: 138 MMHG | BODY MASS INDEX: 34.24 KG/M2 | WEIGHT: 238.6 LBS

## 2018-05-07 DIAGNOSIS — F32.89 OTHER DEPRESSION: ICD-10-CM

## 2018-05-07 PROCEDURE — 1036F TOBACCO NON-USER: CPT | Performed by: NURSE PRACTITIONER

## 2018-05-07 PROCEDURE — 99213 OFFICE O/P EST LOW 20 MIN: CPT | Performed by: NURSE PRACTITIONER

## 2018-05-07 PROCEDURE — 3017F COLORECTAL CA SCREEN DOC REV: CPT | Performed by: NURSE PRACTITIONER

## 2018-05-07 PROCEDURE — G8417 CALC BMI ABV UP PARAM F/U: HCPCS | Performed by: NURSE PRACTITIONER

## 2018-05-07 PROCEDURE — G8427 DOCREV CUR MEDS BY ELIG CLIN: HCPCS | Performed by: NURSE PRACTITIONER

## 2018-05-07 RX ORDER — ZOLPIDEM TARTRATE 5 MG/1
5 TABLET ORAL DAILY
Refills: 0 | COMMUNITY
Start: 2018-04-19 | End: 2018-06-25 | Stop reason: SDUPTHER

## 2018-05-07 RX ORDER — ESCITALOPRAM OXALATE 20 MG/1
20 TABLET ORAL DAILY
Qty: 90 TABLET | Refills: 1 | Status: SHIPPED | OUTPATIENT
Start: 2018-05-07 | End: 2018-05-21 | Stop reason: SDUPTHER

## 2018-05-07 ASSESSMENT — ENCOUNTER SYMPTOMS
EYES NEGATIVE: 1
ABDOMINAL PAIN: 0
SHORTNESS OF BREATH: 0
WHEEZING: 0
COUGH: 0
GASTROINTESTINAL NEGATIVE: 1
DIARRHEA: 0
ALLERGIC/IMMUNOLOGIC NEGATIVE: 1
CONSTIPATION: 0
RESPIRATORY NEGATIVE: 1

## 2018-05-18 DIAGNOSIS — K70.31 ALCOHOLIC CIRRHOSIS OF LIVER WITH ASCITES (HCC): ICD-10-CM

## 2018-05-21 DIAGNOSIS — F32.89 OTHER DEPRESSION: ICD-10-CM

## 2018-05-21 RX ORDER — FUROSEMIDE 40 MG/1
TABLET ORAL
Qty: 90 TABLET | Refills: 1 | Status: SHIPPED | OUTPATIENT
Start: 2018-05-21 | End: 2018-12-20 | Stop reason: SDUPTHER

## 2018-05-21 RX ORDER — ESCITALOPRAM OXALATE 20 MG/1
20 TABLET ORAL DAILY
Qty: 90 TABLET | Refills: 1 | Status: SHIPPED | OUTPATIENT
Start: 2018-05-21 | End: 2019-01-24 | Stop reason: SDUPTHER

## 2018-06-25 DIAGNOSIS — G47.00 INSOMNIA, UNSPECIFIED TYPE: Primary | ICD-10-CM

## 2018-06-25 RX ORDER — ZOLPIDEM TARTRATE 5 MG/1
5 TABLET ORAL DAILY
Qty: 30 TABLET | Refills: 0 | Status: SHIPPED | OUTPATIENT
Start: 2018-06-25 | End: 2018-07-26 | Stop reason: SDUPTHER

## 2018-07-03 DIAGNOSIS — F41.9 ANXIETY: ICD-10-CM

## 2018-07-03 RX ORDER — HYDROXYZINE PAMOATE 25 MG/1
CAPSULE ORAL
Qty: 90 CAPSULE | Refills: 1 | OUTPATIENT
Start: 2018-07-03

## 2018-07-26 DIAGNOSIS — G47.00 INSOMNIA, UNSPECIFIED TYPE: ICD-10-CM

## 2018-07-27 NOTE — TELEPHONE ENCOUNTER
Last seen 5/7/18    Next Visit Date:  Future Appointments  Date Time Provider Carolyn English   8/7/2018 1:15 PM MASSIMO Montoya CNP [facility]   8/7/2018 3:30 PM MASSIMO Corral CNP [facility] [address] Maintenance   Topic Date Due    Shingles Vaccine (1 of 2 - 2 Dose Series) 08/15/2009    Low dose CT lung screening  08/15/2014    HIV screen  12/04/2018 (Originally 8/15/1974)    Flu vaccine (1) 09/01/2018    Potassium monitoring  10/17/2018    Creatinine monitoring  10/17/2018    Diabetes screen  03/16/2020    Lipid screen  03/23/2022    Colon cancer screen colonoscopy  03/30/2023    DTaP/Tdap/Td vaccine (2 - Td) 04/06/2027    Pneumococcal med risk  Completed       No results found for: LABA1C          ( goal A1C is < 7)   No results found for: LABMICR  LDL Cholesterol (mg/dL)   Date Value   03/23/2017 87   07/31/2015 140 (H)       (goal LDL is <100)   AST (U/L)   Date Value   03/08/2018 36     ALT (U/L)   Date Value   03/08/2018 18     BUN (mg/dL)   Date Value   10/17/2017 15     BP Readings from Last 3 Encounters:   05/07/18 138/80   05/02/18 (!) 150/93   03/30/18 (!) 150/88          (goal 120/80)    All Future Testing planned in [facility]  Lab Frequency Next Occurrence   PSA Screening Once 08/14/2018   Hepatic Function Panel Once 08/02/2018   CBC Auto Differential Once 08/02/2018   Hepatitis C RNA, quantitative, PCR Once 08/02/2018               Patient Active Problem List:     Back pain, chronic     Hearing difficulty     GERD (gastroesophageal reflux disease)     Cervical radicular pain     Alcohol withdrawal ([address]     Hyponatremia     Alcoholic cirrhosis of liver with ascites (HCC)     Ascites     Hypomagnesemia     Chronic viral hepatitis B without delta agent and without coma ([address]     Acute hepatitis C virus infection without hepatic coma     Ascites due to alcoholic hepatitis     Calculus of gallbladder without cholecystitis     Hep C w/o coma, chronic

## 2018-07-30 DIAGNOSIS — G47.00 INSOMNIA, UNSPECIFIED TYPE: ICD-10-CM

## 2018-07-30 RX ORDER — ZOLPIDEM TARTRATE 5 MG/1
TABLET ORAL
Qty: 30 TABLET | Refills: 0 | Status: SHIPPED | OUTPATIENT
Start: 2018-07-30 | End: 2018-08-29

## 2018-07-30 RX ORDER — ZOLPIDEM TARTRATE 5 MG/1
TABLET ORAL
Qty: 30 TABLET | Refills: 0 | OUTPATIENT
Start: 2018-07-30 | End: 2019-07-30

## 2018-08-10 ENCOUNTER — TELEPHONE (OUTPATIENT)
Dept: PAIN MANAGEMENT | Age: 58
End: 2018-08-10

## 2018-08-15 ENCOUNTER — OFFICE VISIT (OUTPATIENT)
Dept: PAIN MANAGEMENT | Age: 58
End: 2018-08-15

## 2018-08-15 VITALS
TEMPERATURE: 97.4 F | SYSTOLIC BLOOD PRESSURE: 140 MMHG | DIASTOLIC BLOOD PRESSURE: 82 MMHG | HEART RATE: 76 BPM | OXYGEN SATURATION: 96 % | WEIGHT: 238.8 LBS | BODY MASS INDEX: 34.19 KG/M2 | HEIGHT: 70 IN | RESPIRATION RATE: 16 BRPM

## 2018-08-15 DIAGNOSIS — M54.41 CHRONIC BILATERAL LOW BACK PAIN WITH BILATERAL SCIATICA: Primary | ICD-10-CM

## 2018-08-15 DIAGNOSIS — M54.42 CHRONIC BILATERAL LOW BACK PAIN WITH BILATERAL SCIATICA: Primary | ICD-10-CM

## 2018-08-15 DIAGNOSIS — G89.29 CHRONIC BILATERAL LOW BACK PAIN WITH BILATERAL SCIATICA: Primary | ICD-10-CM

## 2018-08-15 DIAGNOSIS — M51.26 LUMBAR DISC HERNIATION: ICD-10-CM

## 2018-08-15 PROCEDURE — 3017F COLORECTAL CA SCREEN DOC REV: CPT | Performed by: ANESTHESIOLOGY

## 2018-08-15 PROCEDURE — 1036F TOBACCO NON-USER: CPT | Performed by: ANESTHESIOLOGY

## 2018-08-15 PROCEDURE — G8417 CALC BMI ABV UP PARAM F/U: HCPCS | Performed by: ANESTHESIOLOGY

## 2018-08-15 PROCEDURE — 99213 OFFICE O/P EST LOW 20 MIN: CPT | Performed by: ANESTHESIOLOGY

## 2018-08-15 PROCEDURE — G8427 DOCREV CUR MEDS BY ELIG CLIN: HCPCS | Performed by: ANESTHESIOLOGY

## 2018-08-15 ASSESSMENT — ENCOUNTER SYMPTOMS
GASTROINTESTINAL NEGATIVE: 1
RESPIRATORY NEGATIVE: 1
BACK PAIN: 1

## 2018-08-22 ENCOUNTER — TELEPHONE (OUTPATIENT)
Dept: UROLOGY | Age: 58
End: 2018-08-22

## 2018-09-04 RX ORDER — SILDENAFIL CITRATE 20 MG/1
TABLET ORAL
Qty: 30 TABLET | Refills: 5 | OUTPATIENT
Start: 2018-09-04

## 2018-09-06 ENCOUNTER — HOSPITAL ENCOUNTER (OUTPATIENT)
Age: 58
Setting detail: OUTPATIENT SURGERY
Discharge: HOME | End: 2018-09-06
Attending: ANESTHESIOLOGY | Admitting: ANESTHESIOLOGY
Payer: MEDICARE

## 2018-09-06 ENCOUNTER — APPOINTMENT (OUTPATIENT)
Dept: GENERAL RADIOLOGY | Age: 58
End: 2018-09-06
Attending: ANESTHESIOLOGY
Payer: MEDICARE

## 2018-09-06 VITALS
DIASTOLIC BLOOD PRESSURE: 85 MMHG | BODY MASS INDEX: 32.93 KG/M2 | HEART RATE: 70 BPM | RESPIRATION RATE: 18 BRPM | WEIGHT: 230 LBS | HEIGHT: 70 IN | OXYGEN SATURATION: 95 % | SYSTOLIC BLOOD PRESSURE: 156 MMHG | TEMPERATURE: 97.5 F

## 2018-09-06 PROCEDURE — 3209999900 FLUORO FOR SURGICAL PROCEDURES

## 2018-09-06 PROCEDURE — 2709999900 HC NON-CHARGEABLE SUPPLY: Performed by: ANESTHESIOLOGY

## 2018-09-06 PROCEDURE — 62323 NJX INTERLAMINAR LMBR/SAC: CPT | Performed by: ANESTHESIOLOGY

## 2018-09-06 PROCEDURE — 2500000003 HC RX 250 WO HCPCS: Performed by: ANESTHESIOLOGY

## 2018-09-06 PROCEDURE — 3600000051 HC PAIN LEVEL 1 ADDL 15 MIN: Performed by: ANESTHESIOLOGY

## 2018-09-06 PROCEDURE — 7100000010 HC PHASE II RECOVERY - FIRST 15 MIN: Performed by: ANESTHESIOLOGY

## 2018-09-06 PROCEDURE — 99152 MOD SED SAME PHYS/QHP 5/>YRS: CPT | Performed by: ANESTHESIOLOGY

## 2018-09-06 PROCEDURE — 6360000004 HC RX CONTRAST MEDICATION: Performed by: ANESTHESIOLOGY

## 2018-09-06 PROCEDURE — 3600000050 HC PAIN LEVEL 1 BASE: Performed by: ANESTHESIOLOGY

## 2018-09-06 PROCEDURE — 6360000002 HC RX W HCPCS: Performed by: ANESTHESIOLOGY

## 2018-09-06 PROCEDURE — 7100000011 HC PHASE II RECOVERY - ADDTL 15 MIN: Performed by: ANESTHESIOLOGY

## 2018-09-06 PROCEDURE — 2580000003 HC RX 258: Performed by: ANESTHESIOLOGY

## 2018-09-06 RX ORDER — SODIUM CHLORIDE 0.9 % (FLUSH) 0.9 %
10 SYRINGE (ML) INJECTION 2 TIMES DAILY
Status: DISCONTINUED | OUTPATIENT
Start: 2018-09-06 | End: 2018-09-06 | Stop reason: HOSPADM

## 2018-09-06 RX ORDER — MIDAZOLAM HYDROCHLORIDE 1 MG/ML
INJECTION INTRAMUSCULAR; INTRAVENOUS PRN
Status: DISCONTINUED | OUTPATIENT
Start: 2018-09-06 | End: 2018-09-06 | Stop reason: HOSPADM

## 2018-09-06 RX ORDER — LIDOCAINE HYDROCHLORIDE 10 MG/ML
1 INJECTION, SOLUTION EPIDURAL; INFILTRATION; INTRACAUDAL; PERINEURAL ONCE
Status: DISCONTINUED | OUTPATIENT
Start: 2018-09-06 | End: 2018-09-06 | Stop reason: HOSPADM

## 2018-09-06 RX ORDER — DEXAMETHASONE SODIUM PHOSPHATE 10 MG/ML
INJECTION INTRAMUSCULAR; INTRAVENOUS PRN
Status: DISCONTINUED | OUTPATIENT
Start: 2018-09-06 | End: 2018-09-06 | Stop reason: HOSPADM

## 2018-09-06 RX ORDER — FENTANYL CITRATE 50 UG/ML
INJECTION, SOLUTION INTRAMUSCULAR; INTRAVENOUS PRN
Status: DISCONTINUED | OUTPATIENT
Start: 2018-09-06 | End: 2018-09-06 | Stop reason: HOSPADM

## 2018-09-06 RX ORDER — LIDOCAINE HYDROCHLORIDE 10 MG/ML
INJECTION, SOLUTION INFILTRATION; PERINEURAL PRN
Status: DISCONTINUED | OUTPATIENT
Start: 2018-09-06 | End: 2018-09-06 | Stop reason: HOSPADM

## 2018-09-06 RX ADMIN — Medication 10 ML: at 13:18

## 2018-09-06 ASSESSMENT — PAIN SCALES - GENERAL
PAINLEVEL_OUTOF10: 0
PAINLEVEL_OUTOF10: 0
PAINLEVEL_OUTOF10: 6
PAINLEVEL_OUTOF10: 0

## 2018-09-06 ASSESSMENT — PAIN - FUNCTIONAL ASSESSMENT: PAIN_FUNCTIONAL_ASSESSMENT: 0-10

## 2018-09-06 ASSESSMENT — PAIN DESCRIPTION - DESCRIPTORS: DESCRIPTORS: ACHING;CRAMPING;DISCOMFORT;DULL

## 2018-09-28 ENCOUNTER — APPOINTMENT (OUTPATIENT)
Dept: GENERAL RADIOLOGY | Age: 58
End: 2018-09-28
Attending: ANESTHESIOLOGY
Payer: MEDICARE

## 2018-09-28 ENCOUNTER — HOSPITAL ENCOUNTER (OUTPATIENT)
Age: 58
Setting detail: OUTPATIENT SURGERY
Discharge: HOME | End: 2018-09-28
Attending: ANESTHESIOLOGY | Admitting: ANESTHESIOLOGY
Payer: MEDICARE

## 2018-09-28 VITALS
OXYGEN SATURATION: 95 % | TEMPERATURE: 98.6 F | WEIGHT: 237.25 LBS | HEART RATE: 64 BPM | HEIGHT: 70 IN | SYSTOLIC BLOOD PRESSURE: 133 MMHG | DIASTOLIC BLOOD PRESSURE: 81 MMHG | BODY MASS INDEX: 33.96 KG/M2 | RESPIRATION RATE: 15 BRPM

## 2018-09-28 PROCEDURE — 7100000011 HC PHASE II RECOVERY - ADDTL 15 MIN: Performed by: ANESTHESIOLOGY

## 2018-09-28 PROCEDURE — 6360000004 HC RX CONTRAST MEDICATION: Performed by: ANESTHESIOLOGY

## 2018-09-28 PROCEDURE — 6360000002 HC RX W HCPCS: Performed by: ANESTHESIOLOGY

## 2018-09-28 PROCEDURE — 3600000050 HC PAIN LEVEL 1 BASE: Performed by: ANESTHESIOLOGY

## 2018-09-28 PROCEDURE — 99152 MOD SED SAME PHYS/QHP 5/>YRS: CPT | Performed by: ANESTHESIOLOGY

## 2018-09-28 PROCEDURE — 2709999900 HC NON-CHARGEABLE SUPPLY: Performed by: ANESTHESIOLOGY

## 2018-09-28 PROCEDURE — 64483 NJX AA&/STRD TFRM EPI L/S 1: CPT | Performed by: ANESTHESIOLOGY

## 2018-09-28 PROCEDURE — 7100000010 HC PHASE II RECOVERY - FIRST 15 MIN: Performed by: ANESTHESIOLOGY

## 2018-09-28 PROCEDURE — 3209999900 FLUORO FOR SURGICAL PROCEDURES

## 2018-09-28 PROCEDURE — 64484 NJX AA&/STRD TFRM EPI L/S EA: CPT | Performed by: ANESTHESIOLOGY

## 2018-09-28 PROCEDURE — 2580000003 HC RX 258: Performed by: ANESTHESIOLOGY

## 2018-09-28 PROCEDURE — 3600000051 HC PAIN LEVEL 1 ADDL 15 MIN: Performed by: ANESTHESIOLOGY

## 2018-09-28 RX ORDER — FENTANYL CITRATE 50 UG/ML
INJECTION, SOLUTION INTRAMUSCULAR; INTRAVENOUS PRN
Status: DISCONTINUED | OUTPATIENT
Start: 2018-09-28 | End: 2018-09-28 | Stop reason: HOSPADM

## 2018-09-28 RX ORDER — MIDAZOLAM HYDROCHLORIDE 1 MG/ML
INJECTION INTRAMUSCULAR; INTRAVENOUS PRN
Status: DISCONTINUED | OUTPATIENT
Start: 2018-09-28 | End: 2018-09-28 | Stop reason: HOSPADM

## 2018-09-28 RX ORDER — SODIUM CHLORIDE 0.9 % (FLUSH) 0.9 %
10 SYRINGE (ML) INJECTION PRN
Status: DISCONTINUED | OUTPATIENT
Start: 2018-09-28 | End: 2018-09-28 | Stop reason: HOSPADM

## 2018-09-28 RX ORDER — DEXAMETHASONE SODIUM PHOSPHATE 10 MG/ML
INJECTION INTRAMUSCULAR; INTRAVENOUS PRN
Status: DISCONTINUED | OUTPATIENT
Start: 2018-09-28 | End: 2018-09-28 | Stop reason: HOSPADM

## 2018-09-28 RX ADMIN — Medication 10 ML: at 14:41

## 2018-09-28 ASSESSMENT — PAIN DESCRIPTION - PAIN TYPE
TYPE: CHRONIC PAIN

## 2018-09-28 ASSESSMENT — PAIN DESCRIPTION - LOCATION
LOCATION: BACK

## 2018-09-28 ASSESSMENT — PAIN DESCRIPTION - DIRECTION: RADIATING_TOWARDS: HIPS

## 2018-09-28 ASSESSMENT — PAIN SCALES - GENERAL
PAINLEVEL_OUTOF10: 5
PAINLEVEL_OUTOF10: 6
PAINLEVEL_OUTOF10: 5
PAINLEVEL_OUTOF10: 6
PAINLEVEL_OUTOF10: 6

## 2018-09-28 ASSESSMENT — PAIN - FUNCTIONAL ASSESSMENT: PAIN_FUNCTIONAL_ASSESSMENT: 0-10

## 2018-09-28 ASSESSMENT — PAIN DESCRIPTION - DESCRIPTORS
DESCRIPTORS: DULL;ACHING
DESCRIPTORS: DULL;ACHING

## 2018-09-28 NOTE — H&P
OTHER SURGICAL HISTORY  01/04/16    steroid injection C7 T1    OTHER SURGICAL HISTORY  11/21/2016    Bilateral Lumbar CACHORRO L4-L5 injections    OTHER SURGICAL HISTORY  12/19/2016    lumbar steroid injection    [place] INJECT ANES/STEROID FORAMEN LUMBAR/SACRAL W IMG GUIDE ,1 LEVEL Bilateral 9/6/2018    BILATERAL L5 CACHORRO performed by Leelee Thornton MD at [address] [facility] N/CARPAL TUNNEL SURG Right 8/29/2017    CARPAL TUNNEL RELEASE RIGHT performed by Tadeo Eldridge MD at [address] [facility] N/CARPAL TUNNEL SURG Left 10/31/2017    CARPAL TUNNEL RELEASE performed by Tadeo Eldridge MD at [address]        Medications Prior to Admission:     Prior to Admission medications    Medication Sig Start Date End Date Taking? Authorizing Provider   furosemide (LASIX) 40 MG tablet take 1 tablet by mouth once daily 5/21/18 9/28/18 Yes MASSIMO Coleman CNP   escitalopram (LEXAPRO) 20 MG tablet Take 1 tablet by mouth daily 5/21/18  Yes MASSIMO Coleman CNP   hydrOXYzine (VISTARIL) 25 MG capsule take 1 capsule by mouth three times a day if needed for anxiety 4/26/18  Yes MASSIMO Coleman CNP   omeprazole (PRILOSEC) 20 MG delayed release capsule Take 20 mg by mouth Daily  11/13/17  Yes Historical Provider, MD        Allergies:     Patient has no known allergies. Social History:     Tobacco:    reports that he quit smoking about 20 months ago. He has a 45.00 pack-year smoking history. He has never used smokeless tobacco.  Alcohol:      reports that he does not drink alcohol. Drug Use:  reports that he does not use drugs. Family History:     Family History   Problem Relation Age of Onset   [facility] Cancer Mother         pancreatic    Cancer Father         bone    Diabetes Sister     Asthma Brother        Review of Systems:     Positive and Negative as described in HPI.     CONSTITUTIONAL:  negative for fevers, chills, sweats, fatigue, weight loss  HEENT:  negative for vision, hearing changes, runny bulk, no abnormal sensation, normal speech, cranial nerves II through XII grossly intact EXCEPT STRENGTH TESTING ON DORS/IPLANTAR FLEXION IS 5/5 BILATERAL PLANTAR, DORSI FLEXION  5/5 RIGHT;  DORSI FLEXIO, LEFT  4/5   Skin: No gross lesions, rashes, bruising or bleeding on exposed skin area  Extremities:  peripheral pulses palpable, no pedal edema or calf pain with palpation  Psych: normal affect     Investigations:      Laboratory Testing:  No results found for this or any previous visit (from the past 24 hour(s)). No results for input(s): HGB, HCT, WBC, MCV, PLATELET, NA, K, CL, CO2, BUN, CREATININE, GLUCOSE, INR, PROTIME, APTT, AST, ALT, LABALBU, HCG in the last 720 hours. Imaging/Diagnostics:        Diagnosis:      1. BILATERAL SCIATICA AND LUMBAR DISC HERNIATION    Plans:     1. BILATERAL L 5 EPIDURAL STEROID INJECTION.       MASSIMO Jones - CNP  9/28/2018  2:42 PM

## 2018-09-28 NOTE — H&P
History and Physical Update    Pt Name: Shelli Bear  MRN: [id number]  YOB: 1960  Date of evaluation: 9/6/2018      [x] I have reviewed the Progress notes found in [facility] dated 8/15/18 by Dr. Lennox Sepulveda which meets the criteria for an Interval History and Physical note and is attached below. Procedure: Bilateral L5 CACHORRO  Dx: Chronic bilateral lower back pain  [x] I have examined  Shelli Bear and there are no changes to the patient or plans. Denies recent illness fever or chills. Denies the use of blood thinners. States last steroid injection was two years ago. Vital signs: BP (!) 172/93   Pulse 72   Temp 97.9 °F (36.6 °C) (Oral)   Resp 18   Ht 5' 10\" (1.778 m)   Wt 230 lb (104.3 kg)   SpO2 96%   BMI 33.00 kg/m²     Allergies:  Patient has no known allergies. Medications:   No current facility-administered medications on file prior to encounter. Current Outpatient Prescriptions on File Prior to Encounter   Medication Sig Dispense Refill    furosemide (LASIX) 40 MG tablet take 1 tablet by mouth once daily 90 tablet 1    escitalopram (LEXAPRO) 20 MG tablet Take 1 tablet by mouth daily 90 tablet 1    hydrOXYzine (VISTARIL) 25 MG capsule take 1 capsule by mouth three times a day if needed for anxiety 90 capsule 1    omeprazole (PRILOSEC) 20 MG delayed release capsule Take 20 mg by mouth Daily   0            Prior to Admission medications    Medication Sig Start Date End Date Taking?  Authorizing Provider   furosemide (LASIX) 40 MG tablet take 1 tablet by mouth once daily 5/21/18 9/6/18 Yes MASSIMO Coleman CNP   escitalopram (LEXAPRO) 20 MG tablet Take 1 tablet by mouth daily 5/21/18  Yes MASSIMO Coleman CNP   hydrOXYzine (VISTARIL) 25 MG capsule take 1 capsule by mouth three times a day if needed for anxiety 4/26/18  Yes MASSIMO Coleman CNP   omeprazole (PRILOSEC) 20 MG delayed release capsule Take 20 mg by mouth Daily  11/13/17  Yes Historical Provider, MD
 OTHER SURGICAL HISTORY  12/19/2016    lumbar steroid injection    [place] INJECT ANES/STEROID FORAMEN LUMBAR/SACRAL W IMG GUIDE ,1 LEVEL Bilateral 9/6/2018    BILATERAL L5 CACHORRO performed by Justina Arita MD at [address] [facility] N/CARPAL TUNNEL SURG Right 8/29/2017    CARPAL TUNNEL RELEASE RIGHT performed by Cassandra Gorman MD at [address] [facility] N/CARPAL TUNNEL SURG Left 10/31/2017    CARPAL TUNNEL RELEASE performed by Cassandra Gorman MD at [address]        Medications Prior to Admission:     Prior to Admission medications    Medication Sig Start Date End Date Taking? Authorizing Provider   furosemide (LASIX) 40 MG tablet take 1 tablet by mouth once daily 5/21/18 9/28/18 Yes MASSIMO Coleman CNP   escitalopram (LEXAPRO) 20 MG tablet Take 1 tablet by mouth daily 5/21/18  Yes MASSIMO Coleman CNP   hydrOXYzine (VISTARIL) 25 MG capsule take 1 capsule by mouth three times a day if needed for anxiety 4/26/18  Yes MASSIMO Coleman CNP   omeprazole (PRILOSEC) 20 MG delayed release capsule Take 20 mg by mouth Daily  11/13/17  Yes Historical Provider, MD        Allergies:     Patient has no known allergies. Social History:     Tobacco:    reports that he quit smoking about 20 months ago. He has a 45.00 pack-year smoking history. He has never used smokeless tobacco.  Alcohol:      reports that he does not drink alcohol. Drug Use:  reports that he does not use drugs. Family History:     Family History   Problem Relation Age of Onset   [facility] Cancer Mother         pancreatic    Cancer Father         bone    Diabetes Sister     Asthma Brother        Review of Systems:     Positive and Negative as described in HPI. CONSTITUTIONAL:  negative for fevers, chills, sweats, fatigue, weight loss  HEENT:  negative for vision, hearing changes, runny nose, throat pain  RESPIRATORY:  negative for shortness of breath, cough, congestion, wheezing.   CARDIOVASCULAR:  negative for chest pain,

## 2018-10-11 ENCOUNTER — OFFICE VISIT (OUTPATIENT)
Dept: PAIN MANAGEMENT | Age: 58
End: 2018-10-11
Payer: MEDICARE

## 2018-10-11 VITALS
SYSTOLIC BLOOD PRESSURE: 146 MMHG | BODY MASS INDEX: 33.5 KG/M2 | HEIGHT: 70 IN | DIASTOLIC BLOOD PRESSURE: 85 MMHG | WEIGHT: 234 LBS | OXYGEN SATURATION: 95 % | HEART RATE: 95 BPM

## 2018-10-11 DIAGNOSIS — G89.29 CHRONIC BILATERAL LOW BACK PAIN WITH BILATERAL SCIATICA: Chronic | ICD-10-CM

## 2018-10-11 DIAGNOSIS — Z92.241 S/P EPIDURAL STEROID INJECTION: Primary | ICD-10-CM

## 2018-10-11 DIAGNOSIS — M54.42 CHRONIC BILATERAL LOW BACK PAIN WITH BILATERAL SCIATICA: Chronic | ICD-10-CM

## 2018-10-11 DIAGNOSIS — M54.41 CHRONIC BILATERAL LOW BACK PAIN WITH BILATERAL SCIATICA: Chronic | ICD-10-CM

## 2018-10-11 DIAGNOSIS — M51.26 LUMBAR DISC HERNIATION: ICD-10-CM

## 2018-10-11 PROCEDURE — G8427 DOCREV CUR MEDS BY ELIG CLIN: HCPCS | Performed by: ANESTHESIOLOGY

## 2018-10-11 PROCEDURE — G8484 FLU IMMUNIZE NO ADMIN: HCPCS | Performed by: ANESTHESIOLOGY

## 2018-10-11 PROCEDURE — 3017F COLORECTAL CA SCREEN DOC REV: CPT | Performed by: ANESTHESIOLOGY

## 2018-10-11 PROCEDURE — G8417 CALC BMI ABV UP PARAM F/U: HCPCS | Performed by: ANESTHESIOLOGY

## 2018-10-11 PROCEDURE — 99213 OFFICE O/P EST LOW 20 MIN: CPT | Performed by: ANESTHESIOLOGY

## 2018-10-11 PROCEDURE — 1036F TOBACCO NON-USER: CPT | Performed by: ANESTHESIOLOGY

## 2018-10-11 ASSESSMENT — ENCOUNTER SYMPTOMS
BACK PAIN: 1
RESPIRATORY NEGATIVE: 1

## 2018-12-16 DIAGNOSIS — K70.31 ALCOHOLIC CIRRHOSIS OF LIVER WITH ASCITES (HCC): ICD-10-CM

## 2018-12-17 RX ORDER — FUROSEMIDE 40 MG/1
TABLET ORAL
Qty: 90 TABLET | Refills: 1 | OUTPATIENT
Start: 2018-12-17 | End: 2019-03-17

## 2018-12-20 DIAGNOSIS — K70.31 ALCOHOLIC CIRRHOSIS OF LIVER WITH ASCITES (HCC): ICD-10-CM

## 2018-12-21 RX ORDER — FUROSEMIDE 40 MG/1
TABLET ORAL
Qty: 90 TABLET | Refills: 1 | Status: SHIPPED | OUTPATIENT
Start: 2018-12-21 | End: 2019-06-22 | Stop reason: SDUPTHER

## 2018-12-21 NOTE — TELEPHONE ENCOUNTER
Last visit: 5/7/18  Last Med refill: 5/21/18    Next Visit Date:  Future Appointments  Date Time Provider Carolyn English   1/24/2019 4:15 PM MASSMIO Arellano CNP [facility] [address] Maintenance   Topic Date Due    Shingles Vaccine (1 of 2 - 2 Dose Series) 08/15/2009    Low dose CT lung screening  08/15/2014    Flu vaccine (1) 09/01/2018    Diabetes screen  03/16/2020    Lipid screen  03/23/2022    Colon cancer screen colonoscopy  03/30/2023    DTaP/Tdap/Td vaccine (2 - Td) 04/06/2027    Pneumococcal med risk  Completed    HIV screen  Excluded       No results found for: LABA1C          ( goal A1C is < 7)   No results found for: LABMICR  LDL Cholesterol (mg/dL)   Date Value   03/23/2017 87   07/31/2015 140 (H)       (goal LDL is <100)   AST (U/L)   Date Value   03/08/2018 36     ALT (U/L)   Date Value   03/08/2018 18     BUN (mg/dL)   Date Value   10/17/2017 15     BP Readings from Last 3 Encounters:   10/11/18 (!) 146/85   09/28/18 133/81   09/06/18 (!) 156/85          (goal 120/80)    All Future Testing planned in [facility]  Lab Frequency Next Occurrence   PSA Screening Once 02/01/2019   Hepatitis C RNA, quantitative, PCR Once 08/02/2018               Patient Active Problem List:     Back pain, chronic     Hearing difficulty     GERD (gastroesophageal reflux disease)     Cervical radicular pain     Alcohol withdrawal ([address]     Hyponatremia     Alcoholic cirrhosis of liver with ascites (HCC)     Ascites     Hypomagnesemia     Chronic viral hepatitis B without delta agent and without coma ([address]     Acute hepatitis C virus infection without hepatic coma     Ascites due to alcoholic hepatitis     Calculus of gallbladder without cholecystitis     Hep C w/o coma, chronic (HCC)     Fatty liver     Weight loss     Itching     Insomnia     Cirrhosis ([address]     Cholelithiasis     Hepatic cirrhosis (HCC)     Chronic bilateral low back pain with bilateral sciatica     DDD (degenerative disc

## 2019-01-10 ENCOUNTER — HOSPITAL ENCOUNTER (OUTPATIENT)
Age: 59
Discharge: HOME | End: 2019-01-10
Payer: MEDICARE

## 2019-01-10 DIAGNOSIS — K70.30 ALCOHOLIC CIRRHOSIS OF LIVER WITHOUT ASCITES (HCC): ICD-10-CM

## 2019-01-10 LAB
ABSOLUTE EOS #: 0.1 K/UL (ref 0–0.4)
ABSOLUTE IMMATURE GRANULOCYTE: ABNORMAL K/UL (ref 0–0.3)
ABSOLUTE LYMPH #: 1.8 K/UL (ref 1–4.8)
ABSOLUTE MONO #: 0.7 K/UL (ref 0.1–1.3)
ALBUMIN SERPL-MCNC: 3.9 G/DL (ref 3.5–5.2)
ALBUMIN/GLOBULIN RATIO: ABNORMAL (ref 1–2.5)
ALP BLD-CCNC: 74 U/L (ref 40–129)
ALT SERPL-CCNC: 31 U/L (ref 5–41)
AST SERPL-CCNC: 52 U/L
BASOPHILS # BLD: 1 % (ref 0–2)
BASOPHILS ABSOLUTE: 0.1 K/UL (ref 0–0.2)
BILIRUB SERPL-MCNC: 0.89 MG/DL (ref 0.3–1.2)
BILIRUBIN DIRECT: 0.22 MG/DL
BILIRUBIN, INDIRECT: 0.67 MG/DL (ref 0–1)
DIFFERENTIAL TYPE: ABNORMAL
EOSINOPHILS RELATIVE PERCENT: 1 % (ref 0–4)
GLOBULIN: ABNORMAL G/DL (ref 1.5–3.8)
HCT VFR BLD CALC: 45.2 % (ref 41–53)
HEMOGLOBIN: 15.3 G/DL (ref 13.5–17.5)
IMMATURE GRANULOCYTES: ABNORMAL %
LYMPHOCYTES # BLD: 26 % (ref 24–44)
MCH RBC QN AUTO: 32.3 PG (ref 26–34)
MCHC RBC AUTO-ENTMCNC: 33.8 G/DL (ref 31–37)
MCV RBC AUTO: 95.5 FL (ref 80–100)
MONOCYTES # BLD: 10 % (ref 1–7)
NRBC AUTOMATED: ABNORMAL PER 100 WBC
PDW BLD-RTO: 12.5 % (ref 11.5–14.9)
PLATELET # BLD: 149 K/UL (ref 150–450)
PLATELET ESTIMATE: ABNORMAL
PMV BLD AUTO: 8.4 FL (ref 6–12)
RBC # BLD: 4.73 M/UL (ref 4.5–5.9)
RBC # BLD: ABNORMAL 10*6/UL
SEG NEUTROPHILS: 62 % (ref 36–66)
SEGMENTED NEUTROPHILS ABSOLUTE COUNT: 4.1 K/UL (ref 1.3–9.1)
TOTAL PROTEIN: 7.4 G/DL (ref 6.4–8.3)
WBC # BLD: 6.7 K/UL (ref 3.5–11)
WBC # BLD: ABNORMAL 10*3/UL

## 2019-01-10 PROCEDURE — 85025 COMPLETE CBC W/AUTO DIFF WBC: CPT

## 2019-01-10 PROCEDURE — 87522 HEPATITIS C REVRS TRNSCRPJ: CPT

## 2019-01-10 PROCEDURE — 36415 COLL VENOUS BLD VENIPUNCTURE: CPT

## 2019-01-10 PROCEDURE — 80076 HEPATIC FUNCTION PANEL: CPT

## 2019-01-14 LAB
DIRECT EXAM: NORMAL
Lab: NORMAL
SPECIMEN DESCRIPTION: NORMAL
STATUS: NORMAL

## 2019-01-24 ENCOUNTER — OFFICE VISIT (OUTPATIENT)
Dept: FAMILY MEDICINE CLINIC | Age: 59
End: 2019-01-24
Payer: MEDICARE

## 2019-01-24 VITALS
DIASTOLIC BLOOD PRESSURE: 72 MMHG | HEART RATE: 95 BPM | BODY MASS INDEX: 35.01 KG/M2 | SYSTOLIC BLOOD PRESSURE: 132 MMHG | TEMPERATURE: 97.8 F | WEIGHT: 244 LBS | OXYGEN SATURATION: 98 %

## 2019-01-24 DIAGNOSIS — Z79.899 MEDICATION MANAGEMENT: ICD-10-CM

## 2019-01-24 DIAGNOSIS — R26.89 LOSS OF BALANCE: ICD-10-CM

## 2019-01-24 DIAGNOSIS — Z23 NEED FOR INFLUENZA VACCINATION: ICD-10-CM

## 2019-01-24 DIAGNOSIS — F32.89 OTHER DEPRESSION: Primary | ICD-10-CM

## 2019-01-24 DIAGNOSIS — E78.2 MIXED HYPERLIPIDEMIA: ICD-10-CM

## 2019-01-24 DIAGNOSIS — F41.9 ANXIETY: ICD-10-CM

## 2019-01-24 DIAGNOSIS — E55.9 VITAMIN D DEFICIENCY: ICD-10-CM

## 2019-01-24 PROCEDURE — G0008 ADMIN INFLUENZA VIRUS VAC: HCPCS | Performed by: NURSE PRACTITIONER

## 2019-01-24 PROCEDURE — G8427 DOCREV CUR MEDS BY ELIG CLIN: HCPCS | Performed by: NURSE PRACTITIONER

## 2019-01-24 PROCEDURE — G8417 CALC BMI ABV UP PARAM F/U: HCPCS | Performed by: NURSE PRACTITIONER

## 2019-01-24 PROCEDURE — 3017F COLORECTAL CA SCREEN DOC REV: CPT | Performed by: NURSE PRACTITIONER

## 2019-01-24 PROCEDURE — 99214 OFFICE O/P EST MOD 30 MIN: CPT | Performed by: NURSE PRACTITIONER

## 2019-01-24 PROCEDURE — 90688 IIV4 VACCINE SPLT 0.5 ML IM: CPT | Performed by: NURSE PRACTITIONER

## 2019-01-24 PROCEDURE — G8482 FLU IMMUNIZE ORDER/ADMIN: HCPCS | Performed by: NURSE PRACTITIONER

## 2019-01-24 PROCEDURE — 1036F TOBACCO NON-USER: CPT | Performed by: NURSE PRACTITIONER

## 2019-01-24 RX ORDER — ESCITALOPRAM OXALATE 20 MG/1
20 TABLET ORAL DAILY
Qty: 90 TABLET | Refills: 1 | Status: SHIPPED | OUTPATIENT
Start: 2019-01-24 | End: 2019-06-30 | Stop reason: SDUPTHER

## 2019-01-24 RX ORDER — HYDROXYZINE PAMOATE 25 MG/1
CAPSULE ORAL
Qty: 90 CAPSULE | Refills: 1 | Status: SHIPPED | OUTPATIENT
Start: 2019-01-24 | End: 2019-04-24 | Stop reason: SDUPTHER

## 2019-01-24 ASSESSMENT — PATIENT HEALTH QUESTIONNAIRE - PHQ9
1. LITTLE INTEREST OR PLEASURE IN DOING THINGS: 1
SUM OF ALL RESPONSES TO PHQ9 QUESTIONS 1 & 2: 2
SUM OF ALL RESPONSES TO PHQ QUESTIONS 1-9: 2
2. FEELING DOWN, DEPRESSED OR HOPELESS: 1
SUM OF ALL RESPONSES TO PHQ QUESTIONS 1-9: 2

## 2019-01-25 ASSESSMENT — ENCOUNTER SYMPTOMS
EYES NEGATIVE: 1
ALLERGIC/IMMUNOLOGIC NEGATIVE: 1
ABDOMINAL PAIN: 0
GASTROINTESTINAL NEGATIVE: 1
SHORTNESS OF BREATH: 0
COUGH: 0
RESPIRATORY NEGATIVE: 1
CONSTIPATION: 0
WHEEZING: 0
DIARRHEA: 0

## 2019-01-30 RX ORDER — ERGOCALCIFEROL 1.25 MG/1
CAPSULE ORAL
Qty: 4 CAPSULE | Refills: 5 | Status: SHIPPED | OUTPATIENT
Start: 2019-01-30 | End: 2020-05-21 | Stop reason: SDUPTHER

## 2019-02-01 ENCOUNTER — HOSPITAL ENCOUNTER (OUTPATIENT)
Dept: VASCULAR LAB | Age: 59
Discharge: HOME | End: 2019-02-01
Payer: MEDICARE

## 2019-02-01 DIAGNOSIS — R26.89 LOSS OF BALANCE: ICD-10-CM

## 2019-02-01 PROCEDURE — 93880 EXTRACRANIAL BILAT STUDY: CPT

## 2019-02-02 ENCOUNTER — HOSPITAL ENCOUNTER (OUTPATIENT)
Age: 59
Discharge: HOME | End: 2019-02-02
Payer: MEDICARE

## 2019-02-02 DIAGNOSIS — Z79.899 MEDICATION MANAGEMENT: ICD-10-CM

## 2019-02-02 DIAGNOSIS — E55.9 VITAMIN D DEFICIENCY: ICD-10-CM

## 2019-02-02 DIAGNOSIS — E78.2 MIXED HYPERLIPIDEMIA: ICD-10-CM

## 2019-02-02 LAB
ALBUMIN SERPL-MCNC: 4.1 G/DL (ref 3.5–5.2)
ALBUMIN/GLOBULIN RATIO: ABNORMAL (ref 1–2.5)
ALP BLD-CCNC: 73 U/L (ref 40–129)
ALT SERPL-CCNC: 55 U/L (ref 5–41)
ANION GAP SERPL CALCULATED.3IONS-SCNC: 16 MMOL/L (ref 9–17)
AST SERPL-CCNC: 88 U/L
BILIRUB SERPL-MCNC: 0.84 MG/DL (ref 0.3–1.2)
BUN BLDV-MCNC: 6 MG/DL (ref 6–20)
BUN/CREAT BLD: ABNORMAL (ref 9–20)
CALCIUM SERPL-MCNC: 9 MG/DL (ref 8.6–10.4)
CHLORIDE BLD-SCNC: 96 MMOL/L (ref 98–107)
CHOLESTEROL/HDL RATIO: 2.9
CHOLESTEROL: 227 MG/DL
CO2: 27 MMOL/L (ref 20–31)
CREAT SERPL-MCNC: 0.55 MG/DL (ref 0.7–1.2)
GFR AFRICAN AMERICAN: >60 ML/MIN
GFR NON-AFRICAN AMERICAN: >60 ML/MIN
GFR SERPL CREATININE-BSD FRML MDRD: ABNORMAL ML/MIN/{1.73_M2}
GFR SERPL CREATININE-BSD FRML MDRD: ABNORMAL ML/MIN/{1.73_M2}
GLUCOSE BLD-MCNC: 103 MG/DL (ref 70–99)
HDLC SERPL-MCNC: 78 MG/DL
LDL CHOLESTEROL: 129 MG/DL (ref 0–130)
POTASSIUM SERPL-SCNC: 3.2 MMOL/L (ref 3.7–5.3)
SODIUM BLD-SCNC: 139 MMOL/L (ref 135–144)
TOTAL PROTEIN: 7.6 G/DL (ref 6.4–8.3)
TRIGL SERPL-MCNC: 102 MG/DL
VITAMIN D 25-HYDROXY: 13.5 NG/ML (ref 30–100)
VLDLC SERPL CALC-MCNC: ABNORMAL MG/DL (ref 1–30)

## 2019-02-02 PROCEDURE — 80061 LIPID PANEL: CPT

## 2019-02-02 PROCEDURE — 82306 VITAMIN D 25 HYDROXY: CPT

## 2019-02-02 PROCEDURE — 36415 COLL VENOUS BLD VENIPUNCTURE: CPT

## 2019-02-02 PROCEDURE — 80053 COMPREHEN METABOLIC PANEL: CPT

## 2019-02-04 DIAGNOSIS — E87.6 HYPOKALEMIA DUE TO LOSS OF POTASSIUM: ICD-10-CM

## 2019-02-04 DIAGNOSIS — E78.00 PURE HYPERCHOLESTEROLEMIA: Primary | ICD-10-CM

## 2019-02-04 RX ORDER — POTASSIUM CHLORIDE 20 MEQ/1
20 TABLET, EXTENDED RELEASE ORAL DAILY
Qty: 90 TABLET | Refills: 1 | Status: SHIPPED | OUTPATIENT
Start: 2019-02-04 | End: 2019-05-09 | Stop reason: SDUPTHER

## 2019-02-04 RX ORDER — ATORVASTATIN CALCIUM 20 MG/1
20 TABLET, FILM COATED ORAL NIGHTLY
Qty: 90 TABLET | Refills: 1 | Status: SHIPPED | OUTPATIENT
Start: 2019-02-04 | End: 2019-08-14 | Stop reason: SDUPTHER

## 2019-04-24 ENCOUNTER — OFFICE VISIT (OUTPATIENT)
Dept: FAMILY MEDICINE CLINIC | Age: 59
End: 2019-04-24
Payer: MEDICARE

## 2019-04-24 VITALS
SYSTOLIC BLOOD PRESSURE: 134 MMHG | WEIGHT: 240.6 LBS | DIASTOLIC BLOOD PRESSURE: 72 MMHG | TEMPERATURE: 97.4 F | BODY MASS INDEX: 34.52 KG/M2 | HEART RATE: 84 BPM | OXYGEN SATURATION: 95 %

## 2019-04-24 DIAGNOSIS — F32.89 OTHER DEPRESSION: Primary | ICD-10-CM

## 2019-04-24 DIAGNOSIS — F33.41 RECURRENT MAJOR DEPRESSIVE DISORDER IN PARTIAL REMISSION (HCC): ICD-10-CM

## 2019-04-24 DIAGNOSIS — E87.6 HYPOKALEMIA: ICD-10-CM

## 2019-04-24 DIAGNOSIS — I10 ESSENTIAL HYPERTENSION: ICD-10-CM

## 2019-04-24 DIAGNOSIS — F41.9 ANXIETY: ICD-10-CM

## 2019-04-24 PROCEDURE — G8417 CALC BMI ABV UP PARAM F/U: HCPCS | Performed by: NURSE PRACTITIONER

## 2019-04-24 PROCEDURE — 99214 OFFICE O/P EST MOD 30 MIN: CPT | Performed by: NURSE PRACTITIONER

## 2019-04-24 PROCEDURE — G8427 DOCREV CUR MEDS BY ELIG CLIN: HCPCS | Performed by: NURSE PRACTITIONER

## 2019-04-24 PROCEDURE — 1036F TOBACCO NON-USER: CPT | Performed by: NURSE PRACTITIONER

## 2019-04-24 PROCEDURE — 3017F COLORECTAL CA SCREEN DOC REV: CPT | Performed by: NURSE PRACTITIONER

## 2019-04-24 RX ORDER — HYDROXYZINE PAMOATE 25 MG/1
CAPSULE ORAL
Qty: 90 CAPSULE | Refills: 2 | Status: SHIPPED | OUTPATIENT
Start: 2019-04-24 | End: 2019-11-01 | Stop reason: SDUPTHER

## 2019-04-24 ASSESSMENT — ENCOUNTER SYMPTOMS
EYES NEGATIVE: 1
COUGH: 0
ALLERGIC/IMMUNOLOGIC NEGATIVE: 1
SHORTNESS OF BREATH: 0
RESPIRATORY NEGATIVE: 1
WHEEZING: 0

## 2019-04-24 NOTE — PROGRESS NOTES
[address] [place] [id number]  Dept: [phone]  Dept Fax: [phone]      Iris Rose is a 60 y.o. male who presents today for hismedical conditions/complaints as noted below. Iris Rose is c/o of 3 Month Follow-Up and Depression            HPI:      HPI  Joey Diallo is here for mood check. He is doing good. He has a son that is graduating from engineering program at [employer]. He has his  Ivana". She gives him a lot of chirag. HEP c - has had treatment, no issues. No problems. Not drinking.        No results found for: LABA1C          ( goal A1Cis < 7)   No results found for: LABMICR  LDL Cholesterol (mg/dL)   Date Value   02/02/2019 129   03/23/2017 87   07/31/2015 140 (H)       (goal LDL is <100)   AST (U/L)   Date Value   02/02/2019 88 (H)     ALT (U/L)   Date Value   02/02/2019 55 (H)     BUN (mg/dL)   Date Value   02/02/2019 6     BP Readings from Last 3 Encounters:   04/24/19 134/72   01/24/19 132/72   10/11/18 (!) 146/85          (goal 120/80)    Past Medical History:   Diagnosis Date    Alcohol abuse     6-12 beers a day; quit drinking July 2016    Arthritis     Back pain, chronic     dr. Shell Razo, orthopedic, every 3-4 months, gets steroid injection    BPH (benign prostatic hypertrophy)     Cholelithiasis     Cirrhosis ([address]     DDD (degenerative disc disease), lumbar     Fatty liver     GERD (gastroesophageal reflux disease)     Hep C w/o coma, chronic (HCC)     History of colon polyps 2016    [place] (hard of hearing)     Stomach ulcer     hx of    Tobacco abuse     Tubular adenoma of colon 2016, 2018    Wears glasses       Past Surgical History:   Procedure Laterality Date    BUNIONECTOMY      twice on right side    BUNIONECTOMY Left     CARPAL TUNNEL RELEASE Right     COLONOSCOPY      at age 35    COLONOSCOPY  10/05/2016    polyps-pathology tubular adenoma, and abnormal looking mucosa right colon-pathology-tubular adenoma    COLONOSCOPY N/A 3/30/2018    COLONOSCOPY POLYPECTOMY COLD BIOPSY performed by Katarzyna Bryant MD at [address]  2018    Small polyp in the sigmoid colon and excised with biopsy forceps--tubular adenoma    ENDOSCOPY, COLON, DIAGNOSTIC      EGD    KNEE SURGERY Left     cyst removed    NASAL SEPTUM SURGERY      OTHER SURGICAL HISTORY  16    steroid injection C7 T1    OTHER SURGICAL HISTORY  2016    Bilateral Lumbar CACHORRO L4-L5 injections    OTHER SURGICAL HISTORY  2016    lumbar steroid injection    OTHER SURGICAL HISTORY  2018    BILATERAL L5 CACHORRO (N/A Back)    NE INJECT ANES/STEROID FORAMEN LUMBAR/SACRAL W IMG GUIDE ,1 LEVEL Bilateral 2018    BILATERAL L5 [facility]ACHORRO [facility] ANES/STEROID [address] [facility] ,1 LEVEL N/A 2018    BILATERAL L5 CACHORRO performed by Radames Tijerina MD at [address] [place] [address] N/CARPAL TUNNEL SURG Right 2017    CARPAL TUNNEL RELEASE RIGHT performed by Hemant Cisneros MD at [address] [place] [address] N/CARPAL TUNNEL SURG Left 10/31/2017    CARPAL TUNNEL RELEASE performed by Hemant Cisneros MD at [address] History   Problem Relation Age of Onset    Cancer Mother         pancreatic    Cancer Father         bone    Diabetes Sister     Asthma Brother           Social History     Tobacco Use    Smoking status: Former Smoker     Packs/day: 1.00     Years: 45.00     Pack years: 45.00     Last attempt to quit: 2017     Years since quittin.2    Smokeless tobacco: Never Used   Substance Use Topics    Alcohol use: No     Comment: 2016 he stopped drinking         Current Outpatient Medications   Medication Sig Dispense Refill    hydrOXYzine (VISTARIL) 25 MG capsule take 1 capsule by mouth three times a day if needed for anxiety 90 capsule 2    atorvastatin (LIPITOR) 20 MG tablet Take 1 tablet by mouth nightly 90 tablet 1    potassium chloride (KLOR-CON M) 20 MEQ extended release tablet Take 1 tablet by mouth daily 90 tablet 1    vitamin D (ERGOCALCIFEROL) 86109 units CAPS capsule take 1 capsule by mouth every week 4 capsule 5    escitalopram (LEXAPRO) 20 MG tablet Take 1 tablet by mouth daily 90 tablet 1    furosemide (LASIX) 40 MG tablet take 1 tablet by mouth once daily 90 tablet 1    omeprazole (PRILOSEC) 20 MG delayed release capsule Take 20 mg by mouth Daily   0     No current facility-administered medications for this visit. No Known Allergies    Health Maintenance   Topic Date Due    Low dose CT lung screening  08/15/2014    Shingles Vaccine (1 of 2) 01/24/2020 (Originally 8/15/2009)    Potassium monitoring  02/02/2020    Creatinine monitoring  02/02/2020    Diabetes screen  03/16/2020    Colon cancer screen colonoscopy  03/30/2023    Lipid screen  02/02/2024    DTaP/Tdap/Td vaccine (2 - Td) 04/06/2027    Hepatitis A vaccine  Completed    Hepatitis B Vaccine  Completed    Flu vaccine  Completed    Pneumococcal 0-64 years Vaccine  Completed    HIV screen  Discontinued          Review of Systems   Constitutional: Positive for fatigue. Negative for diaphoresis and fever. Eyes: Negative. Respiratory: Negative. Negative for cough, shortness of breath and wheezing. Cardiovascular: Negative. Negative for chest pain and palpitations. Endocrine: Negative. Negative for cold intolerance and heat intolerance. Musculoskeletal: Positive for arthralgias. Skin: Negative. Negative for pallor and rash. Allergic/Immunologic: Negative. Neurological: Negative for headaches. Hematological: Negative. Psychiatric/Behavioral: Positive for dysphoric mood and sleep disturbance. The patient is nervous/anxious.         Objective:     /72 (Site: Left Upper Arm, Position: Sitting, Cuff Size: Large Adult)   Pulse 84   Temp 97.4 °F (36.3 °C) (Oral)   Wt 240 lb 9.6 oz (109.1 kg)   SpO2 95%   BMI 34.52 kg/m²   Physical Exam   Constitutional: He is oriented to person, place, and time. Vital signs are normal. He appears well-developed and well-nourished. obese   HENT:   Head: Normocephalic and atraumatic. Neck: Normal range of motion. Cardiovascular: Normal rate, regular rhythm and normal heart sounds. No murmur heard. Pulmonary/Chest: Effort normal.   Musculoskeletal: Normal range of motion. Neurological: He is alert and oriented to person, place, and time. Skin: Skin is warm and dry. No rash noted. No erythema. Psychiatric: His behavior is normal. Judgment and thought content normal.   Vitals reviewed. Assessment:      1. Other depression    2. Essential hypertension    3. Anxiety    4. Hypokalemia               Quality & Risk Score Accuracy    Last edited 04/24/19 16:35 EDT by MASSIMO Nino CNP           Plan:      Orders Placed This Encounter   Procedures    Basic Metabolic Panel     Standing Status:   Future     Standing Expiration Date:   4/24/2020     1. Other depression  3. Anxiety  5. Recurrent major depressive disorder in partial remission (HCC)  - hydrOXYzine (VISTARIL) 25 MG capsule; take 1 capsule by mouth three times a day if needed for anxiety  Dispense: 90 capsule; Refill: 2    2. Essential hypertension    well controlled, continue plan of care;      4. Hypokalemia    - Basic Metabolic Panel; Future            Return in about 6 months (around 10/24/2019). Patient given educational materials - see patientinstructions. Discussed use, benefit, and side effects of prescribed medications. All patient questions answered. Pt voiced understanding. Reviewed health maintenance. Instructed to continue current medications, diet and exercise. Patient agreedwith treatment plan. Follow up as directed.      Electronically signed by MASSIMO Nino CNP on 4/24/2019

## 2019-04-24 NOTE — PROGRESS NOTES
Patient is present for 3 month f/u  Patient states he has been feeling good    Pharmacy and medication reviewed with patient    Visit Information    Have you changed or started any medications since your last visit including any over-the-counter medicines, vitamins, or herbal medicines? no   Have you stopped taking any of your medications? Is so, why? -  no  Are you having any side effects from any of your medications? - no    Have you seen any other physician or provider since your last visit?  no   Have you had any other diagnostic tests since your last visit?  no   Have you been seen in the emergency room and/or had an admission in a hospital since we last saw you?  no   Have you had your routine dental cleaning in the past 6 months?  no     Do you have an active MyChart account? If no, what is the barrier?   Yes    Patient Care Team:  MASSIMO Box CNP as PCP - General (Certified Nurse Practitioner)  Olivia Holman MD as Consulting Physician (Pain Management)    Medical History Review  Past Medical, Family, and Social History reviewed and does contribute to the patient presenting condition    Health Maintenance   Topic Date Due    Low dose CT lung screening  08/15/2014    Shingles Vaccine (1 of 2) 01/24/2020 (Originally 8/15/2009)    Potassium monitoring  02/02/2020    Creatinine monitoring  02/02/2020    Diabetes screen  03/16/2020    Colon cancer screen colonoscopy  03/30/2023    Lipid screen  02/02/2024    DTaP/Tdap/Td vaccine (2 - Td) 04/06/2027    Hepatitis A vaccine  Completed    Hepatitis B Vaccine  Completed    Flu vaccine  Completed    Pneumococcal 0-64 years Vaccine  Completed    HIV screen  Discontinued

## 2019-05-07 ENCOUNTER — HOSPITAL ENCOUNTER (OUTPATIENT)
Age: 59
Discharge: HOME | End: 2019-05-07
Payer: MEDICARE

## 2019-05-07 DIAGNOSIS — E87.6 HYPOKALEMIA: ICD-10-CM

## 2019-05-07 LAB
ANION GAP SERPL CALCULATED.3IONS-SCNC: 17 MMOL/L (ref 9–17)
BUN BLDV-MCNC: 4 MG/DL (ref 6–20)
BUN/CREAT BLD: ABNORMAL (ref 9–20)
CALCIUM SERPL-MCNC: 8.4 MG/DL (ref 8.6–10.4)
CHLORIDE BLD-SCNC: 92 MMOL/L (ref 98–107)
CO2: 24 MMOL/L (ref 20–31)
CREAT SERPL-MCNC: 0.65 MG/DL (ref 0.7–1.2)
GFR AFRICAN AMERICAN: >60 ML/MIN
GFR NON-AFRICAN AMERICAN: >60 ML/MIN
GFR SERPL CREATININE-BSD FRML MDRD: ABNORMAL ML/MIN/{1.73_M2}
GFR SERPL CREATININE-BSD FRML MDRD: ABNORMAL ML/MIN/{1.73_M2}
GLUCOSE BLD-MCNC: 99 MG/DL (ref 70–99)
POTASSIUM SERPL-SCNC: 3.1 MMOL/L (ref 3.7–5.3)
SODIUM BLD-SCNC: 133 MMOL/L (ref 135–144)

## 2019-05-07 PROCEDURE — 80048 BASIC METABOLIC PNL TOTAL CA: CPT

## 2019-05-07 PROCEDURE — 36415 COLL VENOUS BLD VENIPUNCTURE: CPT

## 2019-05-09 DIAGNOSIS — E87.6 HYPOKALEMIA DUE TO LOSS OF POTASSIUM: ICD-10-CM

## 2019-05-09 RX ORDER — SPIRONOLACTONE 50 MG/1
50 TABLET, FILM COATED ORAL DAILY
Qty: 90 TABLET | Refills: 1 | Status: SHIPPED | OUTPATIENT
Start: 2019-05-09 | End: 2019-10-31 | Stop reason: SDUPTHER

## 2019-05-09 RX ORDER — POTASSIUM CHLORIDE 20 MEQ/1
20 TABLET, EXTENDED RELEASE ORAL 2 TIMES DAILY
Qty: 180 TABLET | Refills: 1 | Status: SHIPPED | OUTPATIENT
Start: 2019-05-09 | End: 2020-05-21 | Stop reason: ALTCHOICE

## 2019-05-28 ENCOUNTER — HOSPITAL ENCOUNTER (OUTPATIENT)
Age: 59
Discharge: HOME | End: 2019-05-28
Payer: MEDICARE

## 2019-05-28 DIAGNOSIS — E87.6 HYPOKALEMIA DUE TO LOSS OF POTASSIUM: ICD-10-CM

## 2019-05-28 LAB
ANION GAP SERPL CALCULATED.3IONS-SCNC: 16 MMOL/L (ref 9–17)
BUN BLDV-MCNC: 4 MG/DL (ref 6–20)
BUN/CREAT BLD: ABNORMAL (ref 9–20)
CALCIUM SERPL-MCNC: 8.3 MG/DL (ref 8.6–10.4)
CHLORIDE BLD-SCNC: 104 MMOL/L (ref 98–107)
CO2: 20 MMOL/L (ref 20–31)
CREAT SERPL-MCNC: 0.67 MG/DL (ref 0.7–1.2)
GFR AFRICAN AMERICAN: >60 ML/MIN
GFR NON-AFRICAN AMERICAN: >60 ML/MIN
GFR SERPL CREATININE-BSD FRML MDRD: ABNORMAL ML/MIN/{1.73_M2}
GFR SERPL CREATININE-BSD FRML MDRD: ABNORMAL ML/MIN/{1.73_M2}
GLUCOSE BLD-MCNC: 90 MG/DL (ref 70–99)
POTASSIUM SERPL-SCNC: 4.1 MMOL/L (ref 3.7–5.3)
SODIUM BLD-SCNC: 140 MMOL/L (ref 135–144)

## 2019-05-28 PROCEDURE — 36415 COLL VENOUS BLD VENIPUNCTURE: CPT

## 2019-05-28 PROCEDURE — 80048 BASIC METABOLIC PNL TOTAL CA: CPT

## 2019-06-22 DIAGNOSIS — K70.31 ALCOHOLIC CIRRHOSIS OF LIVER WITH ASCITES (HCC): ICD-10-CM

## 2019-06-24 RX ORDER — FUROSEMIDE 40 MG/1
TABLET ORAL
Qty: 90 TABLET | Refills: 1 | Status: SHIPPED | OUTPATIENT
Start: 2019-06-24 | End: 2020-05-21 | Stop reason: ALTCHOICE

## 2019-06-24 NOTE — TELEPHONE ENCOUNTER
Last visit: 4/24/19  Last Med refill: 12/21/18  Does patient have enough medication for 72 hours: No:     Next Visit Date:  No future appointments.     Health Maintenance   Topic Date Due    Low dose CT lung screening  08/15/2014    Shingles Vaccine (1 of 2) 01/24/2020 (Originally 8/15/2009)    Potassium monitoring  05/28/2020    Creatinine monitoring  05/28/2020    Annual Wellness Visit (AWV)  08/15/2022    Colon cancer screen colonoscopy  03/30/2023    Lipid screen  02/02/2024    DTaP/Tdap/Td vaccine (2 - Td) 04/06/2027    Hepatitis A vaccine  Completed    Hepatitis B Vaccine  Completed    Flu vaccine  Completed    Pneumococcal 0-64 years Vaccine  Completed    HIV screen  Discontinued       No results found for: LABA1C          ( goal A1C is < 7)   No results found for: LABMICR  LDL Cholesterol (mg/dL)   Date Value   02/02/2019 129   03/23/2017 87       (goal LDL is <100)   AST (U/L)   Date Value   02/02/2019 88 (H)     ALT (U/L)   Date Value   02/02/2019 55 (H)     BUN (mg/dL)   Date Value   05/28/2019 4 (L)     BP Readings from Last 3 Encounters:   04/24/19 134/72   01/24/19 132/72   10/11/18 (!) 146/85          (goal 120/80)    All Future Testing planned in [facility]  Lab Frequency Next Occurrence               Patient Active Problem List:     Back pain, chronic     Hearing difficulty     GERD (gastroesophageal reflux disease)     Cervical radicular pain     Chronic viral hepatitis B without delta agent and without coma (HCC)     Calculus of gallbladder without cholecystitis     Hep C w/o coma, chronic (HCC)     Fatty liver     Insomnia     Cirrhosis (HCC)     Hepatic cirrhosis (HCC)     Chronic bilateral low back pain with bilateral sciatica     DDD (degenerative disc disease), lumbar     Depression     Grief     Tubular adenoma of colon     History of colon polyps     Gynecomastia, male     Lumbar radiculitis     Lumbar disc herniation     Tinnitus     Eustachian tube dysfunction     Ganglion cyst     Carpal tunnel syndrome of right wrist     History of hepatitis C     Vitamin D deficiency     Pure hypercholesterolemia     Hypokalemia     Essential hypertension     Recurrent major depressive disorder in partial remission ([address]

## 2019-06-30 DIAGNOSIS — F32.89 OTHER DEPRESSION: ICD-10-CM

## 2019-07-01 RX ORDER — ESCITALOPRAM OXALATE 20 MG/1
TABLET ORAL
Qty: 90 TABLET | Refills: 1 | Status: SHIPPED | OUTPATIENT
Start: 2019-07-01 | End: 2019-12-10 | Stop reason: SDUPTHER

## 2019-07-18 NOTE — OP NOTE
Patient Name: Cabrera Hartmann   YOB: 1960  Room/Bed: STAZ OR Pool/NONE  Medical Record Number: [id number]  Date: 9/6/2018       Sedation/ Anesthesia Plan:   intravenous sedation   as needed. Medications Planned:   midazolam (Versed) / Fentanyl  Intravenously  as needed. Preoperative Diagnosis:   Chronic lower back pain with bilateral sciatica  Lumbar disc disease    Postoperative Diagnosis:   Chronic lower back pain with bilateral sciatica  Lumbar disc disease    Procedure Performed:  Lumbar epidural steroid injection under fluoroscopy guidance at L5-V0upgva    Procedure: The Patient was seen in the preop area, chart was reviewed, informed consent was obtained. Patient was taken to procedure room and was placed in prone position. Vital signs were monitored through out the  Procedure. A time out was completed. The skin over the back was prepped and draped in sterile manner. The target point was marked at The interlaminar space at L5-S1 . Skin and deep tissues were anesthetized with 1 % lidocaine. A 20-gauge Tuohy epidural needlele was advanced  under fluoroscopy guidance in AP view. Epidural space was identified using MED technique. Position ws confirmed in Lateral view. Then after negative aspiration contrast dye Omnipaque-180 was injected with live fluoroscopy in AP views that showed  spread of the contrast in the epidural space  and no vascular runoff or intrathecal spread. Finally 10 ml of treatment solution containing 5 ml of  1 % PF lidocaine and 4 ml of PF NS and 1 ml of dexamethasone 10  mg / ml was injected. The needle was removed and a Band-Aid was placed over the needle  insertion site. The patient's vital signs remained stable and the patient tolerated the procedure well.       Electronically signed by Mary Ann Leigh MD on 9/6/2018 at 4:36 PM Greater than or equal to 4 cm

## 2019-08-14 DIAGNOSIS — E78.00 PURE HYPERCHOLESTEROLEMIA: ICD-10-CM

## 2019-08-14 RX ORDER — ATORVASTATIN CALCIUM 20 MG/1
TABLET, FILM COATED ORAL
Qty: 90 TABLET | Refills: 1 | Status: SHIPPED | OUTPATIENT
Start: 2019-08-14 | End: 2019-10-31 | Stop reason: SDUPTHER

## 2019-08-14 NOTE — TELEPHONE ENCOUNTER
wrist     History of hepatitis C     Vitamin D deficiency     Pure hypercholesterolemia     Hypokalemia     Essential hypertension     Recurrent major depressive disorder in partial remission ([address]

## 2019-12-10 DIAGNOSIS — F32.89 OTHER DEPRESSION: ICD-10-CM

## 2019-12-10 RX ORDER — ESCITALOPRAM OXALATE 20 MG/1
TABLET ORAL
Qty: 90 TABLET | Refills: 0 | Status: SHIPPED | OUTPATIENT
Start: 2019-12-10 | End: 2020-05-21 | Stop reason: ALTCHOICE

## 2019-12-22 RX ORDER — SPIRONOLACTONE 50 MG/1
TABLET, FILM COATED ORAL
Qty: 90 TABLET | Refills: 0 | Status: SHIPPED | OUTPATIENT
Start: 2019-12-22 | End: 2020-04-07

## 2020-02-21 RX ORDER — ESCITALOPRAM OXALATE 20 MG/1
TABLET ORAL
Qty: 90 TABLET | Refills: 0 | OUTPATIENT
Start: 2020-02-21

## 2020-02-26 ENCOUNTER — OFFICE VISIT (OUTPATIENT)
Dept: GASTROENTEROLOGY | Age: 60
End: 2020-02-26
Payer: MEDICARE

## 2020-02-26 ENCOUNTER — HOSPITAL ENCOUNTER (OUTPATIENT)
Age: 60
Discharge: HOME | End: 2020-02-26
Payer: MEDICARE

## 2020-02-26 VITALS
BODY MASS INDEX: 34.07 KG/M2 | DIASTOLIC BLOOD PRESSURE: 76 MMHG | SYSTOLIC BLOOD PRESSURE: 125 MMHG | HEART RATE: 108 BPM | OXYGEN SATURATION: 96 % | WEIGHT: 238 LBS | HEIGHT: 70 IN

## 2020-02-26 LAB
ABSOLUTE EOS #: 0 K/UL (ref 0–0.4)
ABSOLUTE IMMATURE GRANULOCYTE: ABNORMAL K/UL (ref 0–0.3)
ABSOLUTE LYMPH #: 1.4 K/UL (ref 1–4.8)
ABSOLUTE MONO #: 0.8 K/UL (ref 0.1–1.3)
ALBUMIN SERPL-MCNC: 2.5 G/DL (ref 3.5–5.2)
ALBUMIN/GLOBULIN RATIO: ABNORMAL (ref 1–2.5)
ALP BLD-CCNC: 160 U/L (ref 40–129)
ALT SERPL-CCNC: 54 U/L (ref 5–41)
AST SERPL-CCNC: 118 U/L
BASOPHILS # BLD: 1 % (ref 0–2)
BASOPHILS ABSOLUTE: 0 K/UL (ref 0–0.2)
BILIRUB SERPL-MCNC: 5.26 MG/DL (ref 0.3–1.2)
BILIRUBIN DIRECT: 3.61 MG/DL
BILIRUBIN, INDIRECT: 1.65 MG/DL (ref 0–1)
DIFFERENTIAL TYPE: ABNORMAL
EOSINOPHILS RELATIVE PERCENT: 1 % (ref 0–4)
GLOBULIN: ABNORMAL G/DL (ref 1.5–3.8)
HCT VFR BLD CALC: 38.3 % (ref 41–53)
HEMOGLOBIN: 12.6 G/DL (ref 13.5–17.5)
IMMATURE GRANULOCYTES: ABNORMAL %
LYMPHOCYTES # BLD: 25 % (ref 24–44)
MCH RBC QN AUTO: 32.9 PG (ref 26–34)
MCHC RBC AUTO-ENTMCNC: 32.8 G/DL (ref 31–37)
MCV RBC AUTO: 100.1 FL (ref 80–100)
MONOCYTES # BLD: 14 % (ref 1–7)
NRBC AUTOMATED: ABNORMAL PER 100 WBC
PDW BLD-RTO: 16.4 % (ref 11.5–14.9)
PLATELET # BLD: 94 K/UL (ref 150–450)
PLATELET ESTIMATE: ABNORMAL
PMV BLD AUTO: 8.2 FL (ref 6–12)
RBC # BLD: 3.83 M/UL (ref 4.5–5.9)
RBC # BLD: ABNORMAL 10*6/UL
SEG NEUTROPHILS: 59 % (ref 36–66)
SEGMENTED NEUTROPHILS ABSOLUTE COUNT: 3.3 K/UL (ref 1.3–9.1)
TOTAL PROTEIN: 6.3 G/DL (ref 6.4–8.3)
WBC # BLD: 5.5 K/UL (ref 3.5–11)
WBC # BLD: ABNORMAL 10*3/UL

## 2020-02-26 PROCEDURE — G8417 CALC BMI ABV UP PARAM F/U: HCPCS | Performed by: INTERNAL MEDICINE

## 2020-02-26 PROCEDURE — 87522 HEPATITIS C REVRS TRNSCRPJ: CPT

## 2020-02-26 PROCEDURE — 80076 HEPATIC FUNCTION PANEL: CPT

## 2020-02-26 PROCEDURE — 36415 COLL VENOUS BLD VENIPUNCTURE: CPT

## 2020-02-26 PROCEDURE — G8427 DOCREV CUR MEDS BY ELIG CLIN: HCPCS | Performed by: INTERNAL MEDICINE

## 2020-02-26 PROCEDURE — 85025 COMPLETE CBC W/AUTO DIFF WBC: CPT

## 2020-02-26 PROCEDURE — G8484 FLU IMMUNIZE NO ADMIN: HCPCS | Performed by: INTERNAL MEDICINE

## 2020-02-26 PROCEDURE — 99213 OFFICE O/P EST LOW 20 MIN: CPT | Performed by: INTERNAL MEDICINE

## 2020-02-26 PROCEDURE — 3017F COLORECTAL CA SCREEN DOC REV: CPT | Performed by: INTERNAL MEDICINE

## 2020-02-26 PROCEDURE — 1036F TOBACCO NON-USER: CPT | Performed by: INTERNAL MEDICINE

## 2020-02-26 ASSESSMENT — ENCOUNTER SYMPTOMS
RHINORRHEA: 0
ALLERGIC/IMMUNOLOGIC NEGATIVE: 1
SHORTNESS OF BREATH: 0
GASTROINTESTINAL NEGATIVE: 1
WHEEZING: 0
ABDOMINAL DISTENTION: 0
DIARRHEA: 0
VOICE CHANGE: 0
BACK PAIN: 1
VOMITING: 0
COUGH: 0
BLOOD IN STOOL: 0
SINUS PRESSURE: 0
CHOKING: 0
SORE THROAT: 0
RECTAL PAIN: 0
ABDOMINAL PAIN: 0
ANAL BLEEDING: 0
RESPIRATORY NEGATIVE: 1
NAUSEA: 0
SINUS PAIN: 0
CONSTIPATION: 0
TROUBLE SWALLOWING: 0
FACIAL SWELLING: 0

## 2020-02-28 ENCOUNTER — TELEPHONE (OUTPATIENT)
Dept: GASTROENTEROLOGY | Age: 60
End: 2020-02-28

## 2020-02-29 LAB
DIRECT EXAM: NORMAL
Lab: NORMAL
SPECIMEN DESCRIPTION: NORMAL

## 2020-03-18 ENCOUNTER — TELEPHONE (OUTPATIENT)
Dept: GASTROENTEROLOGY | Age: 60
End: 2020-03-18

## 2020-03-18 ENCOUNTER — HOSPITAL ENCOUNTER (OUTPATIENT)
Age: 60
Discharge: HOME | End: 2020-03-18
Payer: MEDICARE

## 2020-03-18 LAB
ABSOLUTE EOS #: 0.1 K/UL (ref 0–0.4)
ABSOLUTE IMMATURE GRANULOCYTE: ABNORMAL K/UL (ref 0–0.3)
ABSOLUTE LYMPH #: 1.4 K/UL (ref 1–4.8)
ABSOLUTE MONO #: 0.7 K/UL (ref 0.1–1.3)
ALBUMIN SERPL-MCNC: 3.6 G/DL (ref 3.5–5.2)
ALBUMIN/GLOBULIN RATIO: ABNORMAL (ref 1–2.5)
ALP BLD-CCNC: 76 U/L (ref 40–129)
ALT SERPL-CCNC: 17 U/L (ref 5–41)
AST SERPL-CCNC: 41 U/L
BASOPHILS # BLD: 1 % (ref 0–2)
BASOPHILS ABSOLUTE: 0 K/UL (ref 0–0.2)
BILIRUB SERPL-MCNC: 1.33 MG/DL (ref 0.3–1.2)
BILIRUBIN DIRECT: 0.63 MG/DL
BILIRUBIN, INDIRECT: 0.7 MG/DL (ref 0–1)
DIFFERENTIAL TYPE: ABNORMAL
EOSINOPHILS RELATIVE PERCENT: 2 % (ref 0–4)
GLOBULIN: ABNORMAL G/DL (ref 1.5–3.8)
HCT VFR BLD CALC: 38.2 % (ref 41–53)
HEMOGLOBIN: 12.9 G/DL (ref 13.5–17.5)
IMMATURE GRANULOCYTES: ABNORMAL %
LYMPHOCYTES # BLD: 23 % (ref 24–44)
MCH RBC QN AUTO: 34.1 PG (ref 26–34)
MCHC RBC AUTO-ENTMCNC: 33.7 G/DL (ref 31–37)
MCV RBC AUTO: 101.2 FL (ref 80–100)
MONOCYTES # BLD: 12 % (ref 1–7)
NRBC AUTOMATED: ABNORMAL PER 100 WBC
PDW BLD-RTO: 14.6 % (ref 11.5–14.9)
PLATELET # BLD: 149 K/UL (ref 150–450)
PLATELET ESTIMATE: ABNORMAL
PMV BLD AUTO: 8.3 FL (ref 6–12)
RBC # BLD: 3.78 M/UL (ref 4.5–5.9)
RBC # BLD: ABNORMAL 10*6/UL
SEG NEUTROPHILS: 62 % (ref 36–66)
SEGMENTED NEUTROPHILS ABSOLUTE COUNT: 3.7 K/UL (ref 1.3–9.1)
TOTAL PROTEIN: 7.1 G/DL (ref 6.4–8.3)
WBC # BLD: 6 K/UL (ref 3.5–11)
WBC # BLD: ABNORMAL 10*3/UL

## 2020-03-18 PROCEDURE — 85025 COMPLETE CBC W/AUTO DIFF WBC: CPT

## 2020-03-18 PROCEDURE — 80076 HEPATIC FUNCTION PANEL: CPT

## 2020-03-18 PROCEDURE — 36415 COLL VENOUS BLD VENIPUNCTURE: CPT

## 2020-03-24 ENCOUNTER — VIRTUAL VISIT (OUTPATIENT)
Dept: GASTROENTEROLOGY | Age: 60
End: 2020-03-24

## 2020-03-24 NOTE — PROGRESS NOTES
Mark Babin is a 60 y.o. male evaluated via telephone on 03/18/2020. Consent:  He and/or health care decision maker is aware that that he may receive a bill for this telephone service, depending on his insurance coverage, and has provided verbal consent to proceed: Yes      Documentation:    I communicated with the patient and/or health care decision maker about jaundice, elevated LFTs. Details of this discussion including any medical advice provided:     HPI    Patient called to discuss recent labs. Patient was last seen 02/26/2020 for follow-up alcoholic cirrhosis of the liver. Patient was found to have elevated LFTs, bilirubin, and appeared jaundice at that time. Patient reports that he has been drinking beer daily. Patient denies any abdominal pain, nausea, vomiting. Tolerating diet. No weight loss, fevers, chills. Bowel movements satisfactory. No swelling of the bilateral lower extremities or abdominal ascites per patient. ROS as above    ASSESSMENT   Diagnosis Orders   1. Gastroesophageal reflux disease without esophagitis     2. History of hepatitis C     3. Fatty liver     4. Alcoholic cirrhosis of liver with ascites [facility])           PLAN  Patient was advised to have labs done and call for results. He was counseled on the abstinence of alcohol. He is advised to eat healthy diet and avoid processed foods, red meats, and to follow a 2 gram sodium diet. To call for results. I affirm this is a Patient Initiated Episode with an Established Patient who has not had a related appointment within my department in the past 7 days or scheduled within the next 24 hours.     Total Time: minutes: 5-10 minutes    Note: not billable if this call serves to triage the patient into an appointment for the relevant concern      Dhavla Valenzuela

## 2020-04-06 NOTE — TELEPHONE ENCOUNTER
Last visit: 4/24/19  Last Med refill: 12/22/19  Does patient have enough medication for 72 hours: Yes    Next Visit Date:  No future appointments.     Health Maintenance   Topic Date Due    Shingles Vaccine (1 of 2) 08/15/2009    Low dose CT lung screening  08/15/2014    Annual Wellness Visit (AWV)  05/29/2019    Lipid screen  02/02/2020    Potassium monitoring  05/28/2020    Creatinine monitoring  05/28/2020    Flu vaccine (Season Ended) 09/01/2020    Colon cancer screen colonoscopy  03/30/2023    DTaP/Tdap/Td vaccine (2 - Td) 04/06/2027    Hepatitis A vaccine  Completed    Hepatitis B vaccine  Completed    Pneumococcal 0-64 years Vaccine  Completed    Hib vaccine  Aged Out    Meningococcal (ACWY) vaccine  Aged Out    HIV screen  Discontinued       No results found for: LABA1C          ( goal A1C is < 7)   No results found for: LABMICR  LDL Cholesterol (mg/dL)   Date Value   02/02/2019 129   03/23/2017 87       (goal LDL is <100)   AST (U/L)   Date Value   03/18/2020 41 (H)     ALT (U/L)   Date Value   03/18/2020 17     BUN (mg/dL)   Date Value   05/28/2019 4 (L)     BP Readings from Last 3 Encounters:   02/26/20 125/76   04/24/19 134/72   01/24/19 132/72          (goal 120/80)    All Future Testing planned in [facility]  Lab Frequency Next Occurrence               Patient Active Problem List:     Back pain, chronic     Hearing difficulty     GERD (gastroesophageal reflux disease)     Cervical radicular pain     Chronic viral hepatitis B without delta agent and without coma (HCC)     Calculus of gallbladder without cholecystitis     Hep C w/o coma, chronic (HCC)     Fatty liver     Insomnia     Cirrhosis (HCC)     Hepatic cirrhosis (HCC)     Chronic bilateral low back pain with bilateral sciatica     DDD (degenerative disc disease), lumbar     Depression     Grief     Tubular adenoma of colon     History of colon polyps     Gynecomastia, male     Lumbar radiculitis     Lumbar disc herniation Tinnitus     Eustachian tube dysfunction     Ganglion cyst     Carpal tunnel syndrome of right wrist     History of hepatitis C     Vitamin D deficiency     Pure hypercholesterolemia     Hypokalemia     Essential hypertension     Recurrent major depressive disorder in partial remission ([address]

## 2020-04-07 RX ORDER — SPIRONOLACTONE 50 MG/1
TABLET, FILM COATED ORAL
Qty: 90 TABLET | Refills: 0 | Status: SHIPPED | OUTPATIENT
Start: 2020-04-07 | End: 2020-06-30

## 2020-05-21 ENCOUNTER — VIRTUAL VISIT (OUTPATIENT)
Dept: FAMILY MEDICINE CLINIC | Age: 60
End: 2020-05-21
Payer: MEDICARE

## 2020-05-21 PROCEDURE — 99442 PR PHYS/QHP TELEPHONE EVALUATION 11-20 MIN: CPT | Performed by: NURSE PRACTITIONER

## 2020-05-21 RX ORDER — ATORVASTATIN CALCIUM 20 MG/1
TABLET, FILM COATED ORAL
Qty: 90 TABLET | Refills: 1 | Status: SHIPPED | OUTPATIENT
Start: 2020-05-21 | End: 2020-07-01 | Stop reason: SDUPTHER

## 2020-05-21 RX ORDER — SILDENAFIL 100 MG/1
100 TABLET, FILM COATED ORAL PRN
Qty: 10 TABLET | Refills: 3 | Status: SHIPPED | OUTPATIENT
Start: 2020-05-21 | End: 2021-08-25 | Stop reason: ALTCHOICE

## 2020-05-21 RX ORDER — ERGOCALCIFEROL 1.25 MG/1
CAPSULE ORAL
Qty: 12 CAPSULE | Refills: 1 | Status: SHIPPED | OUTPATIENT
Start: 2020-05-21 | End: 2021-02-23 | Stop reason: SDUPTHER

## 2020-07-01 ENCOUNTER — OFFICE VISIT (OUTPATIENT)
Dept: PAIN MANAGEMENT | Age: 60
End: 2020-07-01
Payer: MEDICARE

## 2020-07-01 ENCOUNTER — OFFICE VISIT (OUTPATIENT)
Dept: FAMILY MEDICINE CLINIC | Age: 60
End: 2020-07-01
Payer: MEDICARE

## 2020-07-01 VITALS
BODY MASS INDEX: 33.64 KG/M2 | SYSTOLIC BLOOD PRESSURE: 138 MMHG | HEART RATE: 111 BPM | OXYGEN SATURATION: 98 % | DIASTOLIC BLOOD PRESSURE: 70 MMHG | HEIGHT: 70 IN | WEIGHT: 235 LBS

## 2020-07-01 VITALS
OXYGEN SATURATION: 96 % | HEART RATE: 91 BPM | TEMPERATURE: 98 F | SYSTOLIC BLOOD PRESSURE: 158 MMHG | DIASTOLIC BLOOD PRESSURE: 83 MMHG

## 2020-07-01 PROCEDURE — G0444 DEPRESSION SCREEN ANNUAL: HCPCS | Performed by: NURSE PRACTITIONER

## 2020-07-01 PROCEDURE — 99214 OFFICE O/P EST MOD 30 MIN: CPT | Performed by: NURSE PRACTITIONER

## 2020-07-01 PROCEDURE — 1036F TOBACCO NON-USER: CPT | Performed by: ANESTHESIOLOGY

## 2020-07-01 PROCEDURE — G8417 CALC BMI ABV UP PARAM F/U: HCPCS | Performed by: NURSE PRACTITIONER

## 2020-07-01 PROCEDURE — G8431 POS CLIN DEPRES SCRN F/U DOC: HCPCS | Performed by: NURSE PRACTITIONER

## 2020-07-01 PROCEDURE — 3017F COLORECTAL CA SCREEN DOC REV: CPT | Performed by: ANESTHESIOLOGY

## 2020-07-01 PROCEDURE — 3017F COLORECTAL CA SCREEN DOC REV: CPT | Performed by: NURSE PRACTITIONER

## 2020-07-01 PROCEDURE — G8427 DOCREV CUR MEDS BY ELIG CLIN: HCPCS | Performed by: ANESTHESIOLOGY

## 2020-07-01 PROCEDURE — G8417 CALC BMI ABV UP PARAM F/U: HCPCS | Performed by: ANESTHESIOLOGY

## 2020-07-01 PROCEDURE — 1036F TOBACCO NON-USER: CPT | Performed by: NURSE PRACTITIONER

## 2020-07-01 PROCEDURE — G8427 DOCREV CUR MEDS BY ELIG CLIN: HCPCS | Performed by: NURSE PRACTITIONER

## 2020-07-01 PROCEDURE — 99213 OFFICE O/P EST LOW 20 MIN: CPT | Performed by: ANESTHESIOLOGY

## 2020-07-01 RX ORDER — ATORVASTATIN CALCIUM 20 MG/1
TABLET, FILM COATED ORAL
Qty: 90 TABLET | Refills: 1 | Status: SHIPPED | OUTPATIENT
Start: 2020-07-01 | End: 2021-02-23 | Stop reason: SDUPTHER

## 2020-07-01 RX ORDER — FLUOXETINE 10 MG/1
10 CAPSULE ORAL DAILY
Qty: 30 CAPSULE | Refills: 2 | Status: SHIPPED | OUTPATIENT
Start: 2020-07-01 | End: 2020-08-12 | Stop reason: SDUPTHER

## 2020-07-01 ASSESSMENT — ENCOUNTER SYMPTOMS
WHEEZING: 0
BACK PAIN: 1
SHORTNESS OF BREATH: 0
RESPIRATORY NEGATIVE: 1
RESPIRATORY NEGATIVE: 1
COUGH: 0
COUGH: 0
EYES NEGATIVE: 1
ALLERGIC/IMMUNOLOGIC NEGATIVE: 1

## 2020-07-01 ASSESSMENT — PATIENT HEALTH QUESTIONNAIRE - PHQ9
7. TROUBLE CONCENTRATING ON THINGS, SUCH AS READING THE NEWSPAPER OR WATCHING TELEVISION: 1
SUM OF ALL RESPONSES TO PHQ QUESTIONS 1-9: 12
SUM OF ALL RESPONSES TO PHQ9 QUESTIONS 1 & 2: 3
9. THOUGHTS THAT YOU WOULD BE BETTER OFF DEAD, OR OF HURTING YOURSELF: 0
6. FEELING BAD ABOUT YOURSELF - OR THAT YOU ARE A FAILURE OR HAVE LET YOURSELF OR YOUR FAMILY DOWN: 1
SUM OF ALL RESPONSES TO PHQ QUESTIONS 1-9: 12
2. FEELING DOWN, DEPRESSED OR HOPELESS: 3
4. FEELING TIRED OR HAVING LITTLE ENERGY: 3
10. IF YOU CHECKED OFF ANY PROBLEMS, HOW DIFFICULT HAVE THESE PROBLEMS MADE IT FOR YOU TO DO YOUR WORK, TAKE CARE OF THINGS AT HOME, OR GET ALONG WITH OTHER PEOPLE: 2
3. TROUBLE FALLING OR STAYING ASLEEP: 3
8. MOVING OR SPEAKING SO SLOWLY THAT OTHER PEOPLE COULD HAVE NOTICED. OR THE OPPOSITE, BEING SO FIGETY OR RESTLESS THAT YOU HAVE BEEN MOVING AROUND A LOT MORE THAN USUAL: 0
5. POOR APPETITE OR OVEREATING: 1
1. LITTLE INTEREST OR PLEASURE IN DOING THINGS: 0

## 2020-07-01 NOTE — PROGRESS NOTES
The patient is a 60 y. o. Non-/non  male.     Chief Complaint   Patient presents with    Back Pain    Leg Pain     left        HPI    Is a pleasant 70-year-old gentleman who was last seen in my clinic 2 years ago  Today he presented with flareup of his back pain  Back pain is chronic onset many years ago located in the lumbar area across midline left side predominantly  Pain radiates down left leg all the way to the foot  Pain aggravated with standing lifting bending when walking  Associated symptoms include left leg numbness and tingling  Denies any loss of bladder or bowel control  Nothing seems to alleviate the pain  Average pain score 8/10  Describes it as sharp shooting sensation  Pain is significantly interfering with daily life activities    He had tried conservative measures with therapy and medications in past  His last MRI lumbar spine was in 2016 that showed at L4-L5 level disc protrusion resulting in moderate central canal and bilateral severe foraminal stenosis  He had epidural injection in past with excellent outcome  Last injection was 2 years ago  Patient is not taking any blood thinner and is not diabetic    Past Medical History:   Diagnosis Date    Alcohol abuse     6-12 beers a day; quit drinking July 2016    Arthritis     Back pain, chronic     dr. Mark Martínez, orthopedic, every 3-4 months, gets steroid injection    BPH (benign prostatic hypertrophy)     Cholelithiasis     Cirrhosis ([address]     DDD (degenerative disc disease), lumbar     Fatty liver     GERD (gastroesophageal reflux disease)     Hep C w/o coma, chronic ([address]     History of colon polyps 2016    [place] (hard of hearing)     Stomach ulcer     hx of    Tobacco abuse     Tubular adenoma of colon 2016, 2018    Wears glasses       Past Surgical History:   Procedure Laterality Date    BUNIONECTOMY      twice on right side    BUNIONECTOMY Left     CARPAL TUNNEL RELEASE Right     COLONOSCOPY      at age 44    tobacco: Never Used   Substance and Sexual Activity    Alcohol use: No     Comment: 7/17/2016 he stopped drinking    Drug use: No     Frequency: 1.0 times per week     Comment: cocaine,  stopped spring 2016    Sexual activity: Yes     Partners: Female   Lifestyle    Physical activity     Days per week: Not on file     Minutes per session: Not on file    Stress: Not on file   Relationships    Social connections     Talks on phone: Not on file     Gets together: Not on file     Attends Yazidism service: Not on file     Active member of club or organization: Not on file     Attends meetings of clubs or organizations: Not on file     Relationship status: Not on file    Intimate partner violence     Fear of current or ex partner: Not on file     Emotionally abused: Not on file     Physically abused: Not on file     Forced sexual activity: Not on file   Other Topics Concern    Not on file   Social History Narrative     in the past, retired     Family History   Problem Relation Age of Onset    Cancer Mother         pancreatic    Cancer Father         bone    Diabetes Sister     Asthma Brother      No Known Allergies  Patient has no known allergies. There were no vitals filed for this visit.   Current Outpatient Medications   Medication Sig Dispense Refill    atorvastatin (LIPITOR) 20 MG tablet take 1 tablet by mouth at bedtime 90 tablet 1    FLUoxetine (PROZAC) 10 MG capsule Take 1 capsule by mouth daily 30 capsule 2    spironolactone (ALDACTONE) 50 MG tablet take 1 tablet by mouth once daily 90 tablet 2    vitamin D (ERGOCALCIFEROL) 1.25 MG (52056 UT) CAPS capsule take 1 capsule by mouth every week 12 capsule 1    sildenafil (VIAGRA) 100 MG tablet Take 1 tablet by mouth as needed for Erectile Dysfunction (Patient not taking: Reported on 7/1/2020) 10 tablet 3    hydrOXYzine (VISTARIL) 25 MG capsule take 1 capsule by mouth three times a day if needed for anxiety 90 capsule 2    omeprazole (PRILOSEC) 20 MG delayed release capsule Take 20 mg by mouth Daily   0     No current facility-administered medications for this visit. Review of Systems   Constitutional: Negative for chills and fever. Respiratory: Negative. Negative for cough. Musculoskeletal: Positive for back pain. Neurological: Positive for numbness. Psychiatric/Behavioral: Negative. Objective:  General Appearance:  Well-appearing, in no acute distress, uncomfortable and in pain. Vital signs: (most recent): There were no vitals taken for this visit. Vital signs are normal.  No fever. Output: Producing urine and producing stool. HEENT: Normal HEENT exam.  (No visible masses in neck  Range of motion appears normal on cervical spine  External ears appears normal)    Lungs:  Normal effort. Breath sounds clear to auscultation. He is not in respiratory distress. Heart: Normal rate. Abdomen: Abdomen is soft. Extremities: Normal range of motion. Neurological: Patient is alert and oriented to person, place and time. Normal strength. Patient has normal reflexes, normal muscle tone and normal coordination.  (Able to follow command    Psych  Mood is good  Affect is normal). Pupils:  Pupils are equal, round, and reactive to light. Skin:  Warm, dry and pale. No rash, ecchymosis, cyanosis or ulceration.    Musculoskeletal examination  No apparent deformity on inspection  Range of motion is marked limited and associated with pain particularly on forward bending  Gait is antalgic  Straight leg raise is read on left side    Neurological examination  Normal muscle mass, normal muscle tone  Muscle strength upper back and both lower extremities in all muscle groups  Assessment & Plan    Is a pleasant 70-year-old gentleman who was last seen in my clinic 2 years ago  Today he presented with flareup of his back pain  Back pain is chronic onset many years ago located in the lumbar area across midline left side predominantly  Pain radiates down left leg all the way to the foot  Pain aggravated with standing lifting bending when walking  Associated symptoms include left leg numbness and tingling  Denies any loss of bladder or bowel control  Nothing seems to alleviate the pain  Average pain score 8/10  Describes it as sharp shooting sensation  Pain is significantly interfering with daily life activities    He had tried conservative measures with therapy and medications in past  His last MRI lumbar spine was in 2016 that showed at L4-L5 level disc protrusion resulting in moderate central canal and bilateral severe foraminal stenosis  He had epidural injection in past with excellent outcome  Last injection was 2 years ago  Patient is not taking any blood thinner and is not diabetic  1. Chronic bilateral low back pain with bilateral sciatica    2. Lumbar disc herniation        Think it is appropriate to repeat a lumbar epidural injection as that has been very helpful in past  If that fails then consider obtaining a new MRI lumbar spine  Plan discussed with patient and his accompanying wife  They are agreeable to the plan  We will proceed    Orders Placed This Encounter   Procedures    VA INJECT ANES/STEROID FORAMEN LUMBAR/SACRAL W IMG GUIDE ,1 LEVEL      No orders of the defined types were placed in this encounter.          Electronically signed by Nestor Atkinson MD on 7/1/2020 at 1:58 PM

## 2020-07-01 NOTE — PROGRESS NOTES
[address]  [facility].  [address] [address]  Dept: [phone]  Dept Fax: [phone]      Leo Lares is a 60 y.o. male who presents today for hismedical conditions/complaints as noted below. Leo Lares is c/o of Depression and Insomnia            HPI:      HPI  Alfie Waldron is here today with mood and sleep issues. Depression-He is not sleeping well. He finds then that he is dozing off all day. He stopped the lexapro and is not finding his mood to be good. He has a dog that brings him chirag and he gets up with her. He desires to just stay in bed. He has no thoughts of harming himself or others. He is very fatigued. Vitamin D def-using supplement, 50,000 units weekly. +fatigue. Due for recheck on level. Fatigue-questions if his testosterone is low due to the fatigue and ED issues he is experiencing. HLD-using statin nightly and is not having any negative side effects with it.        No results found for: LABA1C          ( goal A1Cis < 7)   No results found for: LABMICR  LDL Cholesterol (mg/dL)   Date Value   02/02/2019 129   03/23/2017 87   07/31/2015 140 (H)       (goal LDL is <100)   AST (U/L)   Date Value   03/18/2020 41 (H)     ALT (U/L)   Date Value   03/18/2020 17     BUN (mg/dL)   Date Value   05/28/2019 4 (L)     BP Readings from Last 3 Encounters:   07/01/20 138/70   02/26/20 125/76   04/24/19 134/72          (goal 120/80)    Past Medical History:   Diagnosis Date    Alcohol abuse     6-12 beers a day; quit drinking July 2016    Arthritis     Back pain, chronic     dr. Margarito Clark, orthopedic, every 3-4 months, gets steroid injection    BPH (benign prostatic hypertrophy)     Cholelithiasis     [facility] ([facility]     DDD (degenerative disc disease), lumbar     Fatty liver     GERD (gastroesophageal reflux disease)     Hep C w/o coma, chronic (HCC)     History of colon polyps 2016    [place] (hard of hearing)     Stomach ulcer Smokeless tobacco: Never Used   Substance Use Topics    Alcohol use: No     Comment: 7/17/2016 he stopped drinking         Current Outpatient Medications   Medication Sig Dispense Refill    atorvastatin (LIPITOR) 20 MG tablet take 1 tablet by mouth at bedtime 90 tablet 1    FLUoxetine (PROZAC) 10 MG capsule Take 1 capsule by mouth daily 30 capsule 2    spironolactone (ALDACTONE) 50 MG tablet take 1 tablet by mouth once daily 90 tablet 2    vitamin D (ERGOCALCIFEROL) 1.25 MG (86546 UT) CAPS capsule take 1 capsule by mouth every week 12 capsule 1    hydrOXYzine (VISTARIL) 25 MG capsule take 1 capsule by mouth three times a day if needed for anxiety 90 capsule 2    omeprazole (PRILOSEC) 20 MG delayed release capsule Take 20 mg by mouth Daily   0    sildenafil (VIAGRA) 100 MG tablet Take 1 tablet by mouth as needed for Erectile Dysfunction (Patient not taking: Reported on 7/1/2020) 10 tablet 3     No current facility-administered medications for this visit. No Known Allergies    Health Maintenance   Topic Date Due    Shingles Vaccine (1 of 2) 08/15/2009    Low dose CT lung screening  08/15/2014    Annual Wellness Visit (AWV)  05/29/2019    Lipid screen  02/02/2020    Potassium monitoring  05/28/2020    Creatinine monitoring  05/28/2020    Flu vaccine (1) 09/01/2020    Colon cancer screen colonoscopy  03/30/2023    DTaP/Tdap/Td vaccine (2 - Td) 04/06/2027    Hepatitis A vaccine  Completed    Hepatitis B vaccine  Completed    Pneumococcal 0-64 years Vaccine  Completed    Hib vaccine  Aged Out    Meningococcal (ACWY) vaccine  Aged Out    HIV screen  Discontinued          Review of Systems   Constitutional: Positive for fatigue. Negative for diaphoresis and fever. Eyes: Negative. Respiratory: Negative. Negative for cough, shortness of breath and wheezing. Cardiovascular: Negative. Negative for chest pain and palpitations. Endocrine: Negative.   Negative for cold intolerance and heat intolerance. Musculoskeletal: Positive for arthralgias. Skin: Negative. Negative for pallor and rash. Allergic/Immunologic: Negative. Neurological: Negative for headaches. Hematological: Negative. Psychiatric/Behavioral: Positive for dysphoric mood and sleep disturbance. The patient is not nervous/anxious. Objective:     /70   Pulse 111   Ht 5' 10\" (1.778 m)   Wt 235 lb (106.6 kg)   SpO2 98%   BMI 33.72 kg/m²   Physical Exam  Vitals signs reviewed. Constitutional:       Appearance: He is well-developed. He is obese. HENT:      Head: Normocephalic and atraumatic. Eyes:      General:         Right eye: No discharge. Left eye: No discharge. Conjunctiva/sclera: Conjunctivae normal.   Neck:      Musculoskeletal: Normal range of motion. Cardiovascular:      Rate and Rhythm: Normal rate and regular rhythm. Heart sounds: Normal heart sounds. No murmur. No friction rub. No gallop. Pulmonary:      Effort: Pulmonary effort is normal. No respiratory distress. Breath sounds: Normal breath sounds. No wheezing or rales. Musculoskeletal: Normal range of motion. Skin:     General: Skin is warm and dry. Findings: No erythema or rash. Neurological:      Mental Status: He is alert and oriented to person, place, and time. Psychiatric:         Mood and Affect: Mood normal.         Behavior: Behavior normal.         Thought Content: Thought content normal.         Judgment: Judgment normal.           Assessment:      1. Other depression    2. Pure hypercholesterolemia    3. Hypokalemia    4. Fatigue, unspecified type    5. Vitamin D deficiency    6.  Positive depression screening                     Plan:      Orders Placed This Encounter   Procedures    Lipid, Fasting     Standing Status:   Future     Standing Expiration Date:   7/1/2021    Basic Metabolic Panel     Standing Status:   Future     Standing Expiration Date:   7/1/2021    Testosterone Free and Total Male     Needs obtained first thing in the morning, fasting     Standing Status:   Future     Standing Expiration Date:   7/1/2021    TSH     Standing Status:   Future     Standing Expiration Date:   7/1/2021    Vitamin D 25 Hydroxy     Standing Status:   Future     Standing Expiration Date:   7/1/2021   Varsha Dietrich MD, Endocrinology, [facility]     Referral Priority:   Routine     Referral Type:   Eval and Treat     Referral Reason:   Specialty Services Required     Referred to Provider:   Prema Archer MD     Requested Specialty:   Endocrinology     Number of Visits Requested:   1    Positive Screen for Clinical Depression with a Documented Follow-up Plan    1. Pure hypercholesterolemia  Continue on statin  - Lipid, Fasting; Future  - atorvastatin (LIPITOR) 20 MG tablet; take 1 tablet by mouth at bedtime  Dispense: 90 tablet; Refill: 1    2. Other depression  Will start prozac and evaluate in 6 weeks  - FLUoxetine (PROZAC) 10 MG capsule; Take 1 capsule by mouth daily  Dispense: 30 capsule; Refill: 2    3. Hypokalemia    - Basic Metabolic Panel; Future    4. Fatigue, unspecified type    - Varsha Dietrich MD, Endocrinology, [facility]  - Testosterone Free and Total Male; Future  - TSH; Future    5. Vitamin D deficiency    - Vitamin D 25 Hydroxy; Future    6. Positive depression screening  See above  - Positive Screen for Clinical Depression with a Documented Follow-up Plan         Return in about 6 weeks (around 8/12/2020) for MOOD. Patient given educational materials - see patientinstructions. Discussed use, benefit, and side effects of prescribed medications. All patient questions answered. Pt voiced understanding. Reviewed health maintenance. Instructed to continue current medications, diet and exercise. Patient agreedwith treatment plan. Follow up as directed.      Electronically signed by MASSIMO Antonio CNP on 7/1/2020  On the basis of positive PHQ-9 screening (PHQ-9 Total Score: 12), the following plan was implemented: medication prescribed: Prozac- 20- patient will call for any significant medication side effects or worsening symptoms of depression. Patient will follow-up in 6 week(s) with PCP.

## 2020-07-01 NOTE — PROGRESS NOTES
Visit Information    Have you changed or started any medications since your last visit including any over-the-counter medicines, vitamins, or herbal medicines? no   Are you having any side effects from any of your medications? -  no  Have you stopped taking any of your medications? Is so, why? -  no    Have you seen any other physician or provider since your last visit? No  Have you had any other diagnostic tests since your last visit? No  Have you been seen in the emergency room and/or had an admission to a hospital since we last saw you? No  Have you had your routine dental cleaning in the past 6 months? no    Have you activated your [employer] account? If not, what are your barriers?  Yes     Patient Care Team:  MASSIMO Akbar CNP as PCP - General (Certified Nurse Practitioner)  MASSIMO Akbar CNP as PCP - [facility]  Lesa Sanchez MD as Consulting Physician (Pain Management)    Medical History Review  Past Medical, Family, and Social History reviewed and does contribute to the patient presenting condition    Health Maintenance   Topic Date Due    Shingles Vaccine (1 of 2) 08/15/2009    Annual Wellness Visit (AWV)  05/29/2019    Lipid screen  02/02/2020    Potassium monitoring  05/28/2020    Creatinine monitoring  05/28/2020    Flu vaccine (1) 09/01/2020    Colon cancer screen colonoscopy  03/30/2023    DTaP/Tdap/Td vaccine (2 - Td) 04/06/2027    Hepatitis A vaccine  Completed    Hepatitis B vaccine  Completed    Pneumococcal 0-64 years Vaccine  Completed    Hib vaccine  Aged Out    Meningococcal (ACWY) vaccine  Aged Out    HIV screen  Discontinued

## 2020-07-03 ENCOUNTER — HOSPITAL ENCOUNTER (OUTPATIENT)
Age: 60
Discharge: HOME | End: 2020-07-03
Payer: MEDICARE

## 2020-07-03 LAB
ANION GAP SERPL CALCULATED.3IONS-SCNC: 11 MMOL/L (ref 9–17)
BUN BLDV-MCNC: 4 MG/DL (ref 8–23)
BUN/CREAT BLD: ABNORMAL (ref 9–20)
CALCIUM SERPL-MCNC: 8.8 MG/DL (ref 8.6–10.4)
CHLORIDE BLD-SCNC: 105 MMOL/L (ref 98–107)
CHOLESTEROL, FASTING: 201 MG/DL
CHOLESTEROL/HDL RATIO: 2.9
CO2: 24 MMOL/L (ref 20–31)
CREAT SERPL-MCNC: 0.58 MG/DL (ref 0.7–1.2)
GFR AFRICAN AMERICAN: >60 ML/MIN
GFR NON-AFRICAN AMERICAN: >60 ML/MIN
GFR SERPL CREATININE-BSD FRML MDRD: ABNORMAL ML/MIN/{1.73_M2}
GFR SERPL CREATININE-BSD FRML MDRD: ABNORMAL ML/MIN/{1.73_M2}
GLUCOSE BLD-MCNC: 120 MG/DL (ref 70–99)
HDLC SERPL-MCNC: 70 MG/DL
LDL CHOLESTEROL: 103 MG/DL (ref 0–130)
POTASSIUM SERPL-SCNC: 3.9 MMOL/L (ref 3.7–5.3)
SEX HORMONE BINDING GLOBULIN: 90 NMOL/L (ref 11–80)
SODIUM BLD-SCNC: 140 MMOL/L (ref 135–144)
TESTOSTERONE FREE-NONMALE: 34.4 PG/ML (ref 47–244)
TESTOSTERONE TOTAL: 357 NG/DL (ref 220–1000)
TRIGLYCERIDE, FASTING: 139 MG/DL
TSH SERPL DL<=0.05 MIU/L-ACNC: 3.35 MIU/L (ref 0.3–5)
VITAMIN D 25-HYDROXY: 20.3 NG/ML (ref 30–100)
VLDLC SERPL CALC-MCNC: ABNORMAL MG/DL (ref 1–30)

## 2020-07-03 PROCEDURE — 80061 LIPID PANEL: CPT

## 2020-07-03 PROCEDURE — 84403 ASSAY OF TOTAL TESTOSTERONE: CPT

## 2020-07-03 PROCEDURE — 36415 COLL VENOUS BLD VENIPUNCTURE: CPT

## 2020-07-03 PROCEDURE — 82306 VITAMIN D 25 HYDROXY: CPT

## 2020-07-03 PROCEDURE — 80048 BASIC METABOLIC PNL TOTAL CA: CPT

## 2020-07-03 PROCEDURE — 84443 ASSAY THYROID STIM HORMONE: CPT

## 2020-07-03 PROCEDURE — 84270 ASSAY OF SEX HORMONE GLOBUL: CPT

## 2020-07-06 ENCOUNTER — HOSPITAL ENCOUNTER (OUTPATIENT)
Dept: PREADMISSION TESTING | Age: 60
Setting detail: SPECIMEN
Discharge: HOME | End: 2020-07-10
Payer: MEDICARE

## 2020-07-06 PROCEDURE — U0004 COV-19 TEST NON-CDC HGH THRU: HCPCS

## 2020-07-07 LAB
SARS-COV-2, PCR: NOT DETECTED
SARS-COV-2, RAPID: NORMAL
SARS-COV-2: NORMAL
SOURCE: NORMAL

## 2020-07-09 ENCOUNTER — APPOINTMENT (OUTPATIENT)
Dept: GENERAL RADIOLOGY | Age: 60
End: 2020-07-09
Attending: ANESTHESIOLOGY
Payer: MEDICARE

## 2020-07-09 ENCOUNTER — HOSPITAL ENCOUNTER (OUTPATIENT)
Age: 60
Setting detail: OUTPATIENT SURGERY
Discharge: HOME | End: 2020-07-09
Attending: ANESTHESIOLOGY | Admitting: ANESTHESIOLOGY
Payer: MEDICARE

## 2020-07-09 VITALS
BODY MASS INDEX: 33.64 KG/M2 | SYSTOLIC BLOOD PRESSURE: 153 MMHG | DIASTOLIC BLOOD PRESSURE: 82 MMHG | TEMPERATURE: 97.7 F | WEIGHT: 235 LBS | OXYGEN SATURATION: 94 % | HEIGHT: 70 IN | RESPIRATION RATE: 18 BRPM | HEART RATE: 92 BPM

## 2020-07-09 PROCEDURE — 6360000002 HC RX W HCPCS: Performed by: ANESTHESIOLOGY

## 2020-07-09 PROCEDURE — 7100000011 HC PHASE II RECOVERY - ADDTL 15 MIN: Performed by: ANESTHESIOLOGY

## 2020-07-09 PROCEDURE — 64484 NJX AA&/STRD TFRM EPI L/S EA: CPT | Performed by: ANESTHESIOLOGY

## 2020-07-09 PROCEDURE — 99152 MOD SED SAME PHYS/QHP 5/>YRS: CPT | Performed by: ANESTHESIOLOGY

## 2020-07-09 PROCEDURE — 6360000004 HC RX CONTRAST MEDICATION: Performed by: ANESTHESIOLOGY

## 2020-07-09 PROCEDURE — 7100000010 HC PHASE II RECOVERY - FIRST 15 MIN: Performed by: ANESTHESIOLOGY

## 2020-07-09 PROCEDURE — 64483 NJX AA&/STRD TFRM EPI L/S 1: CPT | Performed by: ANESTHESIOLOGY

## 2020-07-09 PROCEDURE — 3600000051 HC PAIN LEVEL 1 ADDL 15 MIN: Performed by: ANESTHESIOLOGY

## 2020-07-09 PROCEDURE — 85610 PROTHROMBIN TIME: CPT

## 2020-07-09 PROCEDURE — 3600000050 HC PAIN LEVEL 1 BASE: Performed by: ANESTHESIOLOGY

## 2020-07-09 PROCEDURE — 2500000003 HC RX 250 WO HCPCS: Performed by: ANESTHESIOLOGY

## 2020-07-09 PROCEDURE — 2709999900 HC NON-CHARGEABLE SUPPLY: Performed by: ANESTHESIOLOGY

## 2020-07-09 PROCEDURE — 3209999900 FLUORO FOR SURGICAL PROCEDURES

## 2020-07-09 RX ORDER — DEXAMETHASONE SODIUM PHOSPHATE 10 MG/ML
INJECTION INTRAMUSCULAR; INTRAVENOUS PRN
Status: DISCONTINUED | OUTPATIENT
Start: 2020-07-09 | End: 2020-07-09 | Stop reason: ALTCHOICE

## 2020-07-09 RX ORDER — SODIUM CHLORIDE 0.9 % (FLUSH) 0.9 %
10 SYRINGE (ML) INJECTION EVERY 12 HOURS SCHEDULED
Status: DISCONTINUED | OUTPATIENT
Start: 2020-07-09 | End: 2020-07-09 | Stop reason: HOSPADM

## 2020-07-09 RX ORDER — MIDAZOLAM HYDROCHLORIDE 1 MG/ML
INJECTION INTRAMUSCULAR; INTRAVENOUS PRN
Status: DISCONTINUED | OUTPATIENT
Start: 2020-07-09 | End: 2020-07-09 | Stop reason: ALTCHOICE

## 2020-07-09 RX ORDER — LIDOCAINE HYDROCHLORIDE 10 MG/ML
INJECTION, SOLUTION INFILTRATION; PERINEURAL PRN
Status: DISCONTINUED | OUTPATIENT
Start: 2020-07-09 | End: 2020-07-09 | Stop reason: ALTCHOICE

## 2020-07-09 RX ORDER — FENTANYL CITRATE 50 UG/ML
INJECTION, SOLUTION INTRAMUSCULAR; INTRAVENOUS PRN
Status: DISCONTINUED | OUTPATIENT
Start: 2020-07-09 | End: 2020-07-09 | Stop reason: ALTCHOICE

## 2020-07-09 RX ORDER — SODIUM CHLORIDE 0.9 % (FLUSH) 0.9 %
10 SYRINGE (ML) INJECTION PRN
Status: DISCONTINUED | OUTPATIENT
Start: 2020-07-09 | End: 2020-07-09 | Stop reason: HOSPADM

## 2020-07-09 ASSESSMENT — PAIN SCALES - GENERAL
PAINLEVEL_OUTOF10: 0
PAINLEVEL_OUTOF10: 4
PAINLEVEL_OUTOF10: 0

## 2020-07-09 ASSESSMENT — PAIN DESCRIPTION - PAIN TYPE: TYPE: CHRONIC PAIN

## 2020-07-09 ASSESSMENT — PAIN DESCRIPTION - FREQUENCY: FREQUENCY: CONTINUOUS

## 2020-07-09 ASSESSMENT — PAIN DESCRIPTION - DESCRIPTORS: DESCRIPTORS: ACHING

## 2020-07-09 ASSESSMENT — PAIN DESCRIPTION - LOCATION: LOCATION: BACK

## 2020-07-09 NOTE — H&P
bilateral severe foraminal stenosis  He had epidural injection in past with excellent outcome  Last injection was 2 years ago  Patient is not taking any blood thinner and is not diabetic     Past Medical History        Past Medical History:   Diagnosis Date    Alcohol abuse       6-12 beers a day; quit drinking July 2016    Arthritis      Back pain, chronic       dr. Sammy Baca, orthopedic, every 3-4 months, gets steroid injection    BPH (benign prostatic hypertrophy)      Cholelithiasis      Cirrhosis ([address]      DDD (degenerative disc disease), lumbar      Fatty liver      GERD (gastroesophageal reflux disease)      Hep C w/o coma, chronic (HCC)      History of colon polyps 2016    [place] (hard of hearing)      Stomach ulcer       hx of    Tobacco abuse      Tubular adenoma of colon 2016, 2018    Wears glasses           Past Surgical History         Past Surgical History:   Procedure Laterality Date    BUNIONECTOMY         twice on right side    BUNIONECTOMY Left      CARPAL TUNNEL RELEASE Right      COLONOSCOPY         at age 35    COLONOSCOPY   10/05/2016     polyps-pathology tubular adenoma, and abnormal looking mucosa right colon-pathology-tubular adenoma    COLONOSCOPY N/A 3/30/2018     COLONOSCOPY POLYPECTOMY COLD BIOPSY performed by Oracio Ball MD at [address] [place] [address]   03/30/2018     Small polyp in the sigmoid colon and excised with biopsy forceps--tubular adenoma    ENDOSCOPY, COLON, DIAGNOSTIC         EGD    KNEE SURGERY Left       cyst removed    NASAL SEPTUM SURGERY        OTHER SURGICAL HISTORY   01/04/16     steroid injection C7 T1    OTHER SURGICAL HISTORY   11/21/2016     Bilateral Lumbar CACHORRO L4-L5 injections    OTHER SURGICAL HISTORY   12/19/2016     lumbar steroid injection    OTHER SURGICAL HISTORY   09/28/2018     BILATERAL L5 CACHORRO (N/A Back)    ID INJECT ANES/STEROID FORAMEN LUMBAR/SACRAL W IMG GUIDE ,1 LEVEL Bilateral 9/6/2018     BILATERAL L5 CACHORRO performed by Nestor Atkinson MD at [facility] 65 ANES/STEROID FORAMEN LUMBAR/SACRAL W IMG GUIDE ,1 LEVEL N/A 9/28/2018     BILATERAL L5 CACHORRO performed by Nestor Atkinson MD at [address] [place] [address] N/CARPAL TUNNEL SURG Right 8/29/2017     CARPAL TUNNEL RELEASE RIGHT performed by Jazz Maldonado MD at [address] [place] [address] N/CARPAL TUNNEL SURG Left 10/31/2017     CARPAL TUNNEL RELEASE performed by Jazz Maldonado MD at [address] History            Socioeconomic History    Marital status: Single       Spouse name: Not on file    Number of children: Not on file    Years of education: Not on file    Highest education level: Not on file   Occupational History    Not on file   Social Needs    Financial resource strain: Not on file    Food insecurity       Worry: Not on file       Inability: Not on file    Transportation needs       Medical: Not on file       Non-medical: Not on file   Tobacco Use    Smoking status: Former Smoker       Packs/day: 1.00       Years: 45.00       Pack years: 45.00       Last attempt to quit: 1/17/2017       Years since quitting: 3.4    Smokeless tobacco: Never Used   Substance and Sexual Activity    Alcohol use: No       Comment: 7/17/2016 he stopped drinking    Drug use: No       Frequency: 1.0 times per week       Comment: cocaine,  stopped spring 2016    Sexual activity: Yes       Partners: Female   Lifestyle    Physical activity       Days per week: Not on file       Minutes per session: Not on file    Stress: Not on file   Relationships    Social connections       Talks on phone: Not on file       Gets together: Not on file       Attends Voodoo service: Not on file       Active member of club or organization: Not on file       Attends meetings of clubs or organizations: Not on file       Relationship status: Not on file    Intimate partner violence       Fear of current or ex partner: Not on file       Emotionally abused: Not on file       Physically abused: Not on file       Forced sexual activity: Not on file   Other Topics Concern    Not on file   Social History Narrative      in the past, retired         Family History         Family History   Problem Relation Age of Onset    Cancer Mother           pancreatic    Cancer Father           bone    Diabetes Sister      Asthma Brother           No Known Allergies  Patient has no known allergies. There were no vitals filed for this visit. Current Facility-Administered Medications          Current Outpatient Medications   Medication Sig Dispense Refill    atorvastatin (LIPITOR) 20 MG tablet take 1 tablet by mouth at bedtime 90 tablet 1    FLUoxetine (PROZAC) 10 MG capsule Take 1 capsule by mouth daily 30 capsule 2    spironolactone (ALDACTONE) 50 MG tablet take 1 tablet by mouth once daily 90 tablet 2    vitamin D (ERGOCALCIFEROL) 1.25 MG (50889 UT) CAPS capsule take 1 capsule by mouth every week 12 capsule 1    sildenafil (VIAGRA) 100 MG tablet Take 1 tablet by mouth as needed for Erectile Dysfunction (Patient not taking: Reported on 7/1/2020) 10 tablet 3    hydrOXYzine (VISTARIL) 25 MG capsule take 1 capsule by mouth three times a day if needed for anxiety 90 capsule 2    omeprazole (PRILOSEC) 20 MG delayed release capsule Take 20 mg by mouth Daily    0      No current facility-administered medications for this visit. Review of Systems   Constitutional: Negative for chills and fever. Respiratory: Negative. Negative for cough. Musculoskeletal: Positive for back pain. Neurological: Positive for numbness. Psychiatric/Behavioral: Negative. Objective:  General Appearance:  Well-appearing, in no acute distress, uncomfortable and in pain. Vital signs: (most recent): There were no vitals taken for this visit. Vital signs are normal.  No fever. Output: Producing urine and producing stool.     HEENT: Normal HEENT exam.  (No visible masses in neck  Range of motion appears normal on cervical spine  External ears appears normal)    Lungs:  Normal effort. Breath sounds clear to auscultation. He is not in respiratory distress. Heart: Normal rate. Abdomen: Abdomen is soft. Extremities: Normal range of motion. Neurological: Patient is alert and oriented to person, place and time. Normal strength. Patient has normal reflexes, normal muscle tone and normal coordination.  (Able to follow command     Psych  Mood is good  Affect is normal). Pupils:  Pupils are equal, round, and reactive to light. Skin:  Warm, dry and pale. No rash, ecchymosis, cyanosis or ulceration.    Musculoskeletal examination  No apparent deformity on inspection  Range of motion is marked limited and associated with pain particularly on forward bending  Gait is antalgic  Straight leg raise is read on left side     Neurological examination  Normal muscle mass, normal muscle tone  Muscle strength upper back and both lower extremities in all muscle groups  Assessment & Plan     Is a pleasant 50-year-old gentleman who was last seen in my clinic 2 years ago  Today he presented with flareup of his back pain  Back pain is chronic onset many years ago located in the lumbar area across midline left side predominantly  Pain radiates down left leg all the way to the foot  Pain aggravated with standing lifting bending when walking  Associated symptoms include left leg numbness and tingling  Denies any loss of bladder or bowel control  Nothing seems to alleviate the pain  Average pain score 8/10  Describes it as sharp shooting sensation  Pain is significantly interfering with daily life activities     He had tried conservative measures with therapy and medications in past  His last MRI lumbar spine was in 2016 that showed at L4-L5 level disc protrusion resulting in moderate central canal and bilateral severe foraminal stenosis  He had epidural injection in past with excellent outcome  Last injection was 2 years ago  Patient is not taking any blood thinner and is not diabetic  1. Chronic bilateral low back pain with bilateral sciatica    2. Lumbar disc herniation        Think it is appropriate to repeat a lumbar epidural injection as that has been very helpful in past  If that fails then consider obtaining a new MRI lumbar spine  Plan discussed with patient and his accompanying wife  They are agreeable to the plan  We will proceed         Orders Placed This Encounter   Procedures    IA INJECT ANES/STEROID FORAMEN LUMBAR/SACRAL W IMG GUIDE ,1 LEVEL        Encounter Medications    No orders of the defined types were placed in this encounter.             Electronically signed by Corinna Chun MD on 7/1/2020 at 1:58 PM

## 2020-07-09 NOTE — OP NOTE
Operative Note      Patient: Taiwo Bolden  YOB: 1960  MRN: [id number]    Date of Procedure: 7/9/2020    Pre-Op Diagnosis: DX CHRONIC DANYA LOW BACK PAIN   WITH DANYA SCIATICA    LUMBAR DISC HERNIATION    Post-Op Diagnosis: Same       Procedure(s):  EPIDURAL STEROID INJECTION LEFT L4 L5    Surgeon(s):  Dago Sandoval MD    Assistant:   * No surgical staff found *    Anesthesia: IV Sedation    Estimated Blood Loss (mL): Minimal    Complications: None    Specimens:   * No specimens in log *    Implants:  * No implants in log *      Drains: * No LDAs found *    Findings:  n/a    Detailed Description of Procedure:     Patient Name: Taiwo Bolden   YOB: 1960  Room/Bed: [facility] OR Pool/NONE  Medical Record Number: [id number]  Date: 7/9/2020       Preoperative Diagnosis:    Back pain with left-sided sciatica  Left lumbar radiculitis    Postoperative diagnosis: Back pain with left-sided sciatica  Left lumbar radiculitis      Blood Loss: none    Procedure Performed:  Transforaminal lumbar epidural steroid injection at the levels of L4 and L5  on the Left side under fluoroscopic guidance. Procedure: The Patient was seen in the preop area, chart was reviewed, informed consent was obtained. Patient was taken to procedure room and was placed in prone position. Vital signs were monitored through out the  Procedure. A time out was completed. The skin over the back was prepped and draped in sterile manner. The target point was marked in ipsilateral obliques just below the pedicle at the target levels. Skin and deep tissues were anesthetized with 1 % lidocaine. A 20-gauge, spinal needle was advanced  under fluoroscopy guidance in AP / Oblique and lateral views, such that the tip of the needle lies in the neuroforamina at about the 6 o'clock position under the pedicle of the target vertebra. This was confirmed with AP and lateral views of the fluoroscopy.      Then after negative aspiration contrast dye

## 2020-07-10 ENCOUNTER — TELEPHONE (OUTPATIENT)
Dept: FAMILY MEDICINE CLINIC | Age: 60
End: 2020-07-10

## 2020-07-10 NOTE — TELEPHONE ENCOUNTER
is retiring , office is not excepting new patients , patient is asking for a new referral ?  Please Advice

## 2020-07-16 ENCOUNTER — HOSPITAL ENCOUNTER (OUTPATIENT)
Dept: PREADMISSION TESTING | Age: 60
Setting detail: SPECIMEN
Discharge: HOME | End: 2020-07-20
Payer: MEDICARE

## 2020-07-17 ENCOUNTER — HOSPITAL ENCOUNTER (OUTPATIENT)
Dept: PREADMISSION TESTING | Age: 60
Setting detail: SPECIMEN
Discharge: HOME | End: 2020-07-21
Payer: MEDICARE

## 2020-07-17 PROCEDURE — U0004 COV-19 TEST NON-CDC HGH THRU: HCPCS

## 2020-07-18 LAB
SARS-COV-2, PCR: NOT DETECTED
SARS-COV-2, RAPID: NORMAL
SARS-COV-2: NORMAL
SOURCE: NORMAL

## 2020-07-20 ENCOUNTER — HOSPITAL ENCOUNTER (OUTPATIENT)
Age: 60
Setting detail: OUTPATIENT SURGERY
Discharge: HOME | End: 2020-07-20
Attending: ANESTHESIOLOGY | Admitting: ANESTHESIOLOGY
Payer: MEDICARE

## 2020-07-20 ENCOUNTER — APPOINTMENT (OUTPATIENT)
Dept: GENERAL RADIOLOGY | Age: 60
End: 2020-07-20
Attending: ANESTHESIOLOGY
Payer: MEDICARE

## 2020-07-20 VITALS
DIASTOLIC BLOOD PRESSURE: 74 MMHG | HEIGHT: 70 IN | HEART RATE: 78 BPM | OXYGEN SATURATION: 93 % | TEMPERATURE: 97.3 F | BODY MASS INDEX: 32.93 KG/M2 | WEIGHT: 230 LBS | RESPIRATION RATE: 14 BRPM | SYSTOLIC BLOOD PRESSURE: 136 MMHG

## 2020-07-20 PROCEDURE — 64484 NJX AA&/STRD TFRM EPI L/S EA: CPT | Performed by: ANESTHESIOLOGY

## 2020-07-20 PROCEDURE — 99152 MOD SED SAME PHYS/QHP 5/>YRS: CPT | Performed by: ANESTHESIOLOGY

## 2020-07-20 PROCEDURE — 2709999900 HC NON-CHARGEABLE SUPPLY: Performed by: ANESTHESIOLOGY

## 2020-07-20 PROCEDURE — 3600000050 HC PAIN LEVEL 1 BASE: Performed by: ANESTHESIOLOGY

## 2020-07-20 PROCEDURE — 2500000003 HC RX 250 WO HCPCS: Performed by: ANESTHESIOLOGY

## 2020-07-20 PROCEDURE — 6360000004 HC RX CONTRAST MEDICATION: Performed by: ANESTHESIOLOGY

## 2020-07-20 PROCEDURE — 64483 NJX AA&/STRD TFRM EPI L/S 1: CPT | Performed by: ANESTHESIOLOGY

## 2020-07-20 PROCEDURE — 7100000010 HC PHASE II RECOVERY - FIRST 15 MIN: Performed by: ANESTHESIOLOGY

## 2020-07-20 PROCEDURE — 3209999900 FLUORO FOR SURGICAL PROCEDURES

## 2020-07-20 PROCEDURE — 3600000051 HC PAIN LEVEL 1 ADDL 15 MIN: Performed by: ANESTHESIOLOGY

## 2020-07-20 PROCEDURE — 7100000011 HC PHASE II RECOVERY - ADDTL 15 MIN: Performed by: ANESTHESIOLOGY

## 2020-07-20 PROCEDURE — 6360000002 HC RX W HCPCS: Performed by: ANESTHESIOLOGY

## 2020-07-20 RX ORDER — SODIUM CHLORIDE 0.9 % (FLUSH) 0.9 %
10 SYRINGE (ML) INJECTION PRN
Status: DISCONTINUED | OUTPATIENT
Start: 2020-07-20 | End: 2020-07-20 | Stop reason: HOSPADM

## 2020-07-20 RX ORDER — FENTANYL CITRATE 50 UG/ML
INJECTION, SOLUTION INTRAMUSCULAR; INTRAVENOUS PRN
Status: DISCONTINUED | OUTPATIENT
Start: 2020-07-20 | End: 2020-07-20 | Stop reason: ALTCHOICE

## 2020-07-20 RX ORDER — LIDOCAINE HYDROCHLORIDE 10 MG/ML
INJECTION, SOLUTION INFILTRATION; PERINEURAL PRN
Status: DISCONTINUED | OUTPATIENT
Start: 2020-07-20 | End: 2020-07-20 | Stop reason: ALTCHOICE

## 2020-07-20 RX ORDER — DEXAMETHASONE SODIUM PHOSPHATE 10 MG/ML
INJECTION INTRAMUSCULAR; INTRAVENOUS PRN
Status: DISCONTINUED | OUTPATIENT
Start: 2020-07-20 | End: 2020-07-20 | Stop reason: ALTCHOICE

## 2020-07-20 RX ORDER — SODIUM CHLORIDE 0.9 % (FLUSH) 0.9 %
10 SYRINGE (ML) INJECTION EVERY 12 HOURS SCHEDULED
Status: DISCONTINUED | OUTPATIENT
Start: 2020-07-20 | End: 2020-07-20 | Stop reason: HOSPADM

## 2020-07-20 RX ORDER — MIDAZOLAM HYDROCHLORIDE 1 MG/ML
INJECTION INTRAMUSCULAR; INTRAVENOUS PRN
Status: DISCONTINUED | OUTPATIENT
Start: 2020-07-20 | End: 2020-07-20 | Stop reason: ALTCHOICE

## 2020-07-20 ASSESSMENT — PAIN SCALES - GENERAL
PAINLEVEL_OUTOF10: 4
PAINLEVEL_OUTOF10: 0
PAINLEVEL_OUTOF10: 4
PAINLEVEL_OUTOF10: 5
PAINLEVEL_OUTOF10: 0
PAINLEVEL_OUTOF10: 0

## 2020-07-20 ASSESSMENT — PAIN - FUNCTIONAL ASSESSMENT: PAIN_FUNCTIONAL_ASSESSMENT: 0-10

## 2020-07-20 ASSESSMENT — PAIN DESCRIPTION - DESCRIPTORS: DESCRIPTORS: ACHING;DULL

## 2020-07-20 NOTE — H&P
Daily  11/13/17   Historical Provider, MD         This is a 60 y.o. male who is pleasant, cooperative, alert and oriented x3, in no acute distress. Heart: Heart sounds are normal.  HR 84 regular rate and rhythm without murmur, gallop or rub. Lungs: Normal respiratory effort with equal expansion, good air exchange, unlabored and clear to auscultation without wheezes or rales bilaterally   Abdomen: rotund, obese, nontender, nondistended with bowel sounds . Neuro: equal bilateral lower extremity strength  No calf tenderness or peripheral edema       Labs:  Recent Labs     07/03/20  1624      K 3.9      CO2 24   BUN 4*   CREATININE 0.58*   GLUCOSE 120*       Recent Labs     07/17/20  1000   COVID19 Not Detected                   Erna Grant ANP-BC  Electronically signed 7/20/2020 at 11:03 AM        Kelvin Rankin MD    Physician    Specialty:  Pain Management    Progress Notes      Signed    Encounter Date:  7/1/2020                Signed        Expand All Collapse All      The patient is a 60 y. o. Non-/non  male.      Chief Complaint   Patient presents with    Back Pain    Leg Pain       left         HPI     Is a pleasant 76-year-old gentleman who was last seen in my clinic 2 years ago  Today he presented with flareup of his back pain  Back pain is chronic onset many years ago located in the lumbar area across midline left side predominantly  Pain radiates down left leg all the way to the foot  Pain aggravated with standing lifting bending when walking  Associated symptoms include left leg numbness and tingling  Denies any loss of bladder or bowel control  Nothing seems to alleviate the pain  Average pain score 8/10  Describes it as sharp shooting sensation  Pain is significantly interfering with daily life activities     He had tried conservative measures with therapy and medications in past  His last MRI lumbar spine was in 2016 that showed at L4-L5 level disc protrusion resulting in moderate central canal and bilateral severe foraminal stenosis  He had epidural injection in past with excellent outcome  Last injection was 2 years ago  Patient is not taking any blood thinner and is not diabetic     Past Medical History        Past Medical History:   Diagnosis Date    Alcohol abuse       6-12 beers a day; quit drinking July 2016    Arthritis      Back pain, chronic       dr. Rosaline George, orthopedic, every 3-4 months, gets steroid injection    BPH (benign prostatic hypertrophy)      Cholelithiasis      Cirrhosis ([address]      DDD (degenerative disc disease), lumbar      Fatty liver      GERD (gastroesophageal reflux disease)      Hep C w/o coma, chronic (HCC)      History of colon polyps 2016    [place] (hard of hearing)      Stomach ulcer       hx of    Tobacco abuse      Tubular adenoma of colon 2016, 2018    Wears glasses           Past Surgical History         Past Surgical History:   Procedure Laterality Date    BUNIONECTOMY         twice on right side    BUNIONECTOMY Left      CARPAL TUNNEL RELEASE Right      COLONOSCOPY         at age 35    COLONOSCOPY   10/05/2016     polyps-pathology tubular adenoma, and abnormal looking mucosa right colon-pathology-tubular adenoma    COLONOSCOPY N/A 3/30/2018     COLONOSCOPY POLYPECTOMY COLD BIOPSY performed by Leanna Sim MD at [address] [place] [address]   03/30/2018     Small polyp in the sigmoid colon and excised with biopsy forceps--tubular adenoma    ENDOSCOPY, COLON, DIAGNOSTIC         EGD    KNEE SURGERY Left       cyst removed    NASAL SEPTUM SURGERY        OTHER SURGICAL HISTORY   01/04/16     steroid injection C7 T1    OTHER SURGICAL HISTORY   11/21/2016     Bilateral Lumbar CACHORRO L4-L5 injections    OTHER SURGICAL HISTORY   12/19/2016     lumbar steroid injection    OTHER SURGICAL HISTORY   09/28/2018     BILATERAL L5 CACHORRO (N/A Back)    CT INJECT ANES/STEROID FORAMEN LUMBAR/SACRAL W IMG GUIDE ,1 LEVEL Bilateral 9/6/2018     BILATERAL L5 CACHORRO performed by Celsa Solo MD at [facility] 65 ANES/STEROID FORAMEN LUMBAR/SACRAL W IMG GUIDE ,1 LEVEL N/A 9/28/2018     BILATERAL L5 CACHORRO performed by Celsa Solo MD at [address] [place] [address] N/CARPAL TUNNEL SURG Right 8/29/2017     CARPAL TUNNEL RELEASE RIGHT performed by Aldair Bray MD at [address] [place] [address] N/CARPAL TUNNEL SURG Left 10/31/2017     CARPAL TUNNEL RELEASE performed by Aldair Bray MD at [address] [place] [address] Rd History            Socioeconomic History    Marital status: Single       Spouse name: Not on file    Number of children: Not on file    Years of education: Not on file    Highest education level: Not on file   Occupational History    Not on file   Social Needs    Financial resource strain: Not on file    Food insecurity       Worry: Not on file       Inability: Not on file    Transportation needs       Medical: Not on file       Non-medical: Not on file   Tobacco Use    Smoking status: Former Smoker       Packs/day: 1.00       Years: 45.00       Pack years: 45.00       Last attempt to quit: 1/17/2017       Years since quitting: 3.4    Smokeless tobacco: Never Used   Substance and Sexual Activity    Alcohol use: No       Comment: 7/17/2016 he stopped drinking    Drug use: No       Frequency: 1.0 times per week       Comment: cocaine,  stopped spring 2016    Sexual activity: Yes       Partners: Female   Lifestyle    Physical activity       Days per week: Not on file       Minutes per session: Not on file    Stress: Not on file   Relationships    Social connections       Talks on phone: Not on file       Gets together: Not on file       Attends Hoahaoism service: Not on file       Active member of club or organization: Not on file       Attends meetings of clubs or organizations: Not on file       Relationship status: Not on file    Intimate partner violence       Fear of current or ex partner: Not on file       Emotionally abused: Not on file       Physically abused: Not on file       Forced sexual activity: Not on file   Other Topics Concern    Not on file   Social History Narrative      in the past, retired        Family History         Family History   Problem Relation Age of Onset    Cancer Mother           pancreatic    Cancer Father           bone    Diabetes Sister      Asthma Brother          No Known Allergies  Patient has no known allergies. There were no vitals filed for this visit. Current Facility-Administered Medications          Current Outpatient Medications   Medication Sig Dispense Refill    atorvastatin (LIPITOR) 20 MG tablet take 1 tablet by mouth at bedtime 90 tablet 1    FLUoxetine (PROZAC) 10 MG capsule Take 1 capsule by mouth daily 30 capsule 2    spironolactone (ALDACTONE) 50 MG tablet take 1 tablet by mouth once daily 90 tablet 2    vitamin D (ERGOCALCIFEROL) 1.25 MG (49031 UT) CAPS capsule take 1 capsule by mouth every week 12 capsule 1    sildenafil (VIAGRA) 100 MG tablet Take 1 tablet by mouth as needed for Erectile Dysfunction (Patient not taking: Reported on 7/1/2020) 10 tablet 3    hydrOXYzine (VISTARIL) 25 MG capsule take 1 capsule by mouth three times a day if needed for anxiety 90 capsule 2    omeprazole (PRILOSEC) 20 MG delayed release capsule Take 20 mg by mouth Daily    0      No current facility-administered medications for this visit. Review of Systems   Constitutional: Negative for chills and fever. Respiratory: Negative. Negative for cough. Musculoskeletal: Positive for back pain. Neurological: Positive for numbness. Psychiatric/Behavioral: Negative. Objective:  General Appearance:  Well-appearing, in no acute distress, uncomfortable and in pain. Vital signs: (most recent): There were no vitals taken for this visit. Vital signs are normal.  No fever. Output: Producing urine and producing stool.     HEENT: Normal HEENT exam.  (No visible masses in neck  Range of motion appears normal on cervical spine  External ears appears normal)    Lungs:  Normal effort. Breath sounds clear to auscultation. He is not in respiratory distress. Heart: Normal rate. Abdomen: Abdomen is soft. Extremities: Normal range of motion. Neurological: Patient is alert and oriented to person, place and time. Normal strength. Patient has normal reflexes, normal muscle tone and normal coordination.  (Able to follow command     Psych  Mood is good  Affect is normal). Pupils:  Pupils are equal, round, and reactive to light. Skin:  Warm, dry and pale. No rash, ecchymosis, cyanosis or ulceration.    Musculoskeletal examination  No apparent deformity on inspection  Range of motion is marked limited and associated with pain particularly on forward bending  Gait is antalgic  Straight leg raise is read on left side     Neurological examination  Normal muscle mass, normal muscle tone  Muscle strength upper back and both lower extremities in all muscle groups  Assessment & Plan     Is a pleasant 59-year-old gentleman who was last seen in my clinic 2 years ago  Today he presented with flareup of his back pain  Back pain is chronic onset many years ago located in the lumbar area across midline left side predominantly  Pain radiates down left leg all the way to the foot  Pain aggravated with standing lifting bending when walking  Associated symptoms include left leg numbness and tingling  Denies any loss of bladder or bowel control  Nothing seems to alleviate the pain  Average pain score 8/10  Describes it as sharp shooting sensation  Pain is significantly interfering with daily life activities     He had tried conservative measures with therapy and medications in past  His last MRI lumbar spine was in 2016 that showed at L4-L5 level disc protrusion resulting in moderate central canal and bilateral severe foraminal stenosis  He had epidural injection in past with excellent outcome  Last injection was 2 years ago  Patient is not taking any blood thinner and is not diabetic  1. Chronic bilateral low back pain with bilateral sciatica    2. Lumbar disc herniation        Think it is appropriate to repeat a lumbar epidural injection as that has been very helpful in past  If that fails then consider obtaining a new MRI lumbar spine  Plan discussed with patient and his accompanying wife  They are agreeable to the plan  We will proceed         Orders Placed This Encounter   Procedures    DC INJECT ANES/STEROID FORAMEN LUMBAR/SACRAL W IMG GUIDE ,1 LEVEL        Encounter Medications    No orders of the defined types were placed in this encounter. Electronically signed by Tricia Aguero MD on 7/1/2020 at 1:58 PM               ·   Office Visit on 7/1/2020   ·   ·   Detailed Report   ·   Progress Notes Info     Author  Note Status  Last Update User    Tricia Aguero MD  Signed  Tricia Aguero MD    Last Update Date/Time: 7/1/2020  2:07 PM    Chart Review Routing History     No routing history on file.

## 2020-07-20 NOTE — OP NOTE
Operative Note      Patient: Stevie Chavez  YOB: 1960  MRN: [id number]    Date of Procedure: 7/20/2020    Pre-Op Diagnosis: DX  CHRONIC DANYA LOW BACK PAIN WITH BILATERAL SCIATICA   LUMBAR DISC HERNIATION    Post-Op Diagnosis: Same       Procedure(s):  LEFT L4 L5 EPIDURAL STEROID INJECTION    Surgeon(s):  Diana Valencia MD    Assistant:   * No surgical staff found *    Anesthesia: IV Sedation    Estimated Blood Loss (mL): Minimal    Complications: None    Specimens:   * No specimens in log *    Implants:  * No implants in log *      Drains: * No LDAs found *    Findings:  n/a    Detailed Description of Procedure:     Patient Name: Stevie Chavez   YOB: 1960  Room/Bed: ST[facility] OR Pool/NONE  Medical Record Number: [id number]  Date: 7/20/2020       Preoperative Diagnosis:    Bar disc herniation  Low back pain with left-sided sciatica    Postoperative diagnosis:   Bar disc herniation  Low back pain with left-sided sciatica    Blood Loss: none    Procedure Performed:  Transforaminal lumbar epidural steroid injection at the levels of L4 and L5 on the Left side under fluoroscopic guidance. Procedure: The Patient was seen in the preop area, chart was reviewed, informed consent was obtained. Patient was taken to procedure room and was placed in prone position. Vital signs were monitored through out the  Procedure. A time out was completed. The skin over the back was prepped and draped in sterile manner. The target point was marked in ipsilateral obliques just below the pedicle at the target levels. Skin and deep tissues were anesthetized with 1 % lidocaine. A 20-gauge, spinal needle was advanced  under fluoroscopy guidance in AP / Oblique and lateral views, such that the tip of the needle lies in the neuroforamina at about the 6 o'clock position under the pedicle of the target vertebra. This was confirmed with AP and lateral views of the fluoroscopy.      Then after negative aspiration contrast dye Omnipaque-180 was injected with live fluoroscopy in AP views that showed  spread of the contrast in the epidural space  and no vascular runoff or intrathecal spread. Finally 3 ml of treatment solution containing 5 ml of  1 % PF lidocaine and 1 ml of dexamethasone 10 mg / ml was injected. The needle was removed and a Band-Aid was placed over the needle  insertion site. The patient's vital signs remained stable and the patient tolerated the procedure well. The same procedure was then repeated at left at L 5level with same technique. The remaining 3 ml of treatment solution was injected at that. The total dose of steroid injected today was dexamethasone 10 mg. Electronically signed by Tricia Aguero MD on 7/20/2020 at 11:40 AM    SEDATION NOTE:    ASA CLASSIFICATION  2  MP   CLASSIFICATION  2    · Moderate intravenous conscious sedation was supervised by Dr. Bradly Mejia  . The patient was independently monitored by a Registered Nurse assigned to the Procedure Room  . Monitoring included automated blood pressure, continuous EKG, Capnography and continuous pulse oximetry. . The detailed Conscious Record is permanently stored in the [place] [id number].      The following is the conscious sedation record;  Start Time:  1123  End times:  1140  Duration:  17 MINUTES  MEDS GIVEN 2 MG VERSED  MCG FENTANYL    Electronically signed by Tricia Aguero MD on 7/20/2020 at 11:40 AM

## 2020-07-28 RX ORDER — HYDROXYZINE PAMOATE 25 MG/1
CAPSULE ORAL
Qty: 90 CAPSULE | Refills: 2 | Status: SHIPPED | OUTPATIENT
Start: 2020-07-28 | End: 2021-02-23 | Stop reason: SDUPTHER

## 2020-07-28 NOTE — TELEPHONE ENCOUNTER
Please Approve or Refuse.   Send to Pharmacy per Pt's Request:      Next Visit Date:  8/12/2020   Last Visit Date: 7/1/2020    No results found for: LABA1C          ( goal A1C is < 7)   BP Readings from Last 3 Encounters:   07/20/20 136/74   07/09/20 (!) 153/82   07/01/20 (!) 158/83          (goal 120/80)  BUN   Date Value Ref Range Status   07/03/2020 4 (L) 8 - 23 mg/dL Final     CREATININE   Date Value Ref Range Status   07/03/2020 0.58 (L) 0.70 - 1.20 mg/dL Final     Potassium   Date Value Ref Range Status   07/03/2020 3.9 3.7 - 5.3 mmol/L Final

## 2020-08-05 ENCOUNTER — OFFICE VISIT (OUTPATIENT)
Dept: PAIN MANAGEMENT | Age: 60
End: 2020-08-05
Payer: MEDICARE

## 2020-08-05 VITALS
HEART RATE: 95 BPM | SYSTOLIC BLOOD PRESSURE: 159 MMHG | WEIGHT: 236 LBS | DIASTOLIC BLOOD PRESSURE: 89 MMHG | TEMPERATURE: 97.3 F | BODY MASS INDEX: 33.86 KG/M2 | OXYGEN SATURATION: 95 %

## 2020-08-05 PROBLEM — Z92.241 S/P EPIDURAL STEROID INJECTION: Status: ACTIVE | Noted: 2020-08-05

## 2020-08-05 PROCEDURE — 1036F TOBACCO NON-USER: CPT | Performed by: ANESTHESIOLOGY

## 2020-08-05 PROCEDURE — G8417 CALC BMI ABV UP PARAM F/U: HCPCS | Performed by: ANESTHESIOLOGY

## 2020-08-05 PROCEDURE — 99213 OFFICE O/P EST LOW 20 MIN: CPT | Performed by: ANESTHESIOLOGY

## 2020-08-05 PROCEDURE — 3017F COLORECTAL CA SCREEN DOC REV: CPT | Performed by: ANESTHESIOLOGY

## 2020-08-05 PROCEDURE — G8427 DOCREV CUR MEDS BY ELIG CLIN: HCPCS | Performed by: ANESTHESIOLOGY

## 2020-08-05 ASSESSMENT — ENCOUNTER SYMPTOMS
GASTROINTESTINAL NEGATIVE: 1
RESPIRATORY NEGATIVE: 1
BACK PAIN: 1

## 2020-08-05 NOTE — PROGRESS NOTES
The patient is a 60 y. o. Non-/non  male. Chief Complaint   Patient presents with    Follow Up After Procedure        HPI    50-year-old man was seen for flareup of left lumbar radicular pain  He had lumbar epidural steroid injection  Today for postprocedure follow-up  Report 100% improvement in left leg pain    Main issue today is axial back pain  Pain is chronic onset many years ago located in the lumbar area across midline describes it as aching nagging constant stiffness  Pain is worse in the morning  It improves with moving around and get aggravated with routine daily activities  Pain interferes with quality of life  No changes in bladder or bowel control  Has tried conservative measures in past      S/P:07/09/20EPIDURAL STEROID INJECTION LEFT L4 L5  07/20/20LEFT L4 L5 EPIDURAL STEROID INJECTION      Outcome   Any improvement of activity?   No   Any side effects (appetite,leg cramping,facial fleshing):none   Increase of pain:  No  Pain score Today:  3  % of pain relief: 100%  Pain diary (medial branch block): No    No results found for: LABA1C  No results found for: EAG    Past Medical History:   Diagnosis Date    Alcohol abuse     6-12 beers a day; quit drinking July 2016    Arthritis     Back pain, chronic     dr. Amy Petersen, orthopedic, every 3-4 months, gets steroid injection    BPH (benign prostatic hypertrophy)     Cholelithiasis     Cirrhosis ([address]     DDD (degenerative disc disease), lumbar     Fatty liver     GERD (gastroesophageal reflux disease)     Hep C w/o coma, chronic ([address]     History of colon polyps 2016    [place] (hard of hearing)     Hyperlipidemia     Hypertension     Stomach ulcer     hx of    Tobacco abuse     Tubular adenoma of colon 2016, 2018    Wears glasses       Past Surgical History:   Procedure Laterality Date    BUNIONECTOMY      twice on right side    BUNIONECTOMY Left     CARPAL TUNNEL RELEASE Right     COLONOSCOPY      at age 35    COLONOSCOPY 10/05/2016    polyps-pathology tubular adenoma, and abnormal looking mucosa right colon-pathology-tubular adenoma    COLONOSCOPY N/A 3/30/2018    COLONOSCOPY POLYPECTOMY COLD BIOPSY performed by Oracio Ball MD at [address] [place] [address]  03/30/2018    Small polyp in the sigmoid colon and excised with biopsy forceps--tubular adenoma    ENDOSCOPY, COLON, DIAGNOSTIC      EGD    KNEE SURGERY Left     cyst removed    NASAL SEPTUM SURGERY      OTHER SURGICAL HISTORY  01/04/16    steroid injection C7 T1    OTHER SURGICAL HISTORY  11/21/2016    Bilateral Lumbar CACHORRO L4-L5 injections    OTHER SURGICAL HISTORY  12/19/2016    lumbar steroid injection    OTHER SURGICAL HISTORY  09/28/2018    BILATERAL L5 CACHORRO (N/A Back)    PAIN MANAGEMENT PROCEDURE Left 7/9/2020    EPIDURAL STEROID INJECTION LEFT L4 L5 performed by Gabe Lennox, MD at [address] [place] [address] Left 7/20/2020    LEFT L4 L5 EPIDURAL STEROID INJECTION performed by Gabe Lennox, MD at [place] [address] ANES/STEROID [address] ,1 LEVEL Bilateral 9/6/2018    BILATERAL L5 CACHORRO performed by Gabe Lennox, MD at [place] [address] ANES/STEROID [address] [facility] ,1 LEVEL N/A 9/28/2018    BILATERAL L5 CACHORRO performed by Gabe Lennox, MD at [address] [place] [address] N/CARPAL TUNNEL SURG Right 8/29/2017    CARPAL TUNNEL RELEASE RIGHT performed by Kenyon Martell MD at [address] [place] [address] N/CARPAL TUNNEL SURG Left 10/31/2017    CARPAL TUNNEL RELEASE performed by Kenyon Martell MD at [address] [place] [address] History     Socioeconomic History    Marital status: Single     Spouse name: Not on file    Number of children: Not on file    Years of education: Not on file    Highest education level: Not on file   Occupational History    Not on file   Social Needs    Financial resource strain: Not on file    Food insecurity     Worry: Not on file     Inability: Not on file   Soft Tissue Regeneration needs     Medical: Not on file     Non-medical: Not on file   Tobacco Use    Smoking status: Former Smoker     Packs/day: 1.00     Years: 45.00     Pack years: 45.00     Last attempt to quit: 1/17/2017     Years since quitting: 3.5    Smokeless tobacco: Never Used   Substance and Sexual Activity    Alcohol use: No     Comment: 7/17/2016 he stopped drinking    Drug use: No     Frequency: 1.0 times per week     Comment: cocaine,  stopped spring 2016    Sexual activity: Yes     Partners: Female   Lifestyle    Physical activity     Days per week: Not on file     Minutes per session: Not on file    Stress: Not on file   Relationships    Social connections     Talks on phone: Not on file     Gets together: Not on file     Attends Mandaeism service: Not on file     Active member of club or organization: Not on file     Attends meetings of clubs or organizations: Not on file     Relationship status: Not on file    Intimate partner violence     Fear of current or ex partner: Not on file     Emotionally abused: Not on file     Physically abused: Not on file     Forced sexual activity: Not on file   Other Topics Concern    Not on file   Social History Narrative     in the past, retired     Family History   Problem Relation Age of Onset    Cancer Mother         pancreatic    Cancer Father         bone    Diabetes Sister     Asthma Brother      No Known Allergies  Patient has no known allergies.    Vitals:    08/05/20 1548   BP: (!) 159/89   Pulse: 95   Temp: 97.3 °F (36.3 °C)   SpO2: 95%     Current Outpatient Medications   Medication Sig Dispense Refill    hydrOXYzine (VISTARIL) 25 MG capsule take 1 capsule by mouth three times a day if needed for anxiety 90 capsule 2    atorvastatin (LIPITOR) 20 MG tablet take 1 tablet by mouth at bedtime 90 tablet 1    FLUoxetine (PROZAC) 10 MG capsule Take 1 capsule by mouth daily 30 capsule 2    spironolactone (ALDACTONE) 50 MG tablet take 1 tablet by mouth once

## 2020-08-12 ENCOUNTER — OFFICE VISIT (OUTPATIENT)
Dept: FAMILY MEDICINE CLINIC | Age: 60
End: 2020-08-12
Payer: MEDICARE

## 2020-08-12 VITALS
BODY MASS INDEX: 33.69 KG/M2 | WEIGHT: 234.8 LBS | DIASTOLIC BLOOD PRESSURE: 78 MMHG | HEART RATE: 89 BPM | OXYGEN SATURATION: 96 % | TEMPERATURE: 97.6 F | SYSTOLIC BLOOD PRESSURE: 132 MMHG

## 2020-08-12 PROBLEM — J30.2 SEASONAL ALLERGIES: Status: ACTIVE | Noted: 2020-08-12

## 2020-08-12 PROBLEM — R79.89 LOW TESTOSTERONE: Status: ACTIVE | Noted: 2020-08-12

## 2020-08-12 PROCEDURE — 99214 OFFICE O/P EST MOD 30 MIN: CPT | Performed by: NURSE PRACTITIONER

## 2020-08-12 PROCEDURE — G8417 CALC BMI ABV UP PARAM F/U: HCPCS | Performed by: NURSE PRACTITIONER

## 2020-08-12 PROCEDURE — 1036F TOBACCO NON-USER: CPT | Performed by: NURSE PRACTITIONER

## 2020-08-12 PROCEDURE — 3017F COLORECTAL CA SCREEN DOC REV: CPT | Performed by: NURSE PRACTITIONER

## 2020-08-12 PROCEDURE — G8427 DOCREV CUR MEDS BY ELIG CLIN: HCPCS | Performed by: NURSE PRACTITIONER

## 2020-08-12 RX ORDER — FLUOXETINE HYDROCHLORIDE 20 MG/1
20 CAPSULE ORAL DAILY
Qty: 90 CAPSULE | Refills: 0 | Status: SHIPPED | OUTPATIENT
Start: 2020-08-12 | End: 2020-11-06

## 2020-08-12 ASSESSMENT — PATIENT HEALTH QUESTIONNAIRE - PHQ9
SUM OF ALL RESPONSES TO PHQ QUESTIONS 1-9: 0
2. FEELING DOWN, DEPRESSED OR HOPELESS: 0
1. LITTLE INTEREST OR PLEASURE IN DOING THINGS: 0
SUM OF ALL RESPONSES TO PHQ QUESTIONS 1-9: 0
SUM OF ALL RESPONSES TO PHQ9 QUESTIONS 1 & 2: 0

## 2020-08-12 ASSESSMENT — ENCOUNTER SYMPTOMS
COUGH: 0
WHEEZING: 0
EYES NEGATIVE: 1
SHORTNESS OF BREATH: 0
RESPIRATORY NEGATIVE: 1

## 2020-08-12 NOTE — PROGRESS NOTES
[address]  C[facility] K[facility].  [address] [address]  Dept: [phone]  Dept Fax: [phone]      Nuha Kapoor is a 60 y.o. male who presents today for hismedical conditions/complaints as noted below. Nuha Kapoor is c/o of Depression        HPI:      DEBBY Swanson is here today for mood. He continues to feel depressed. He continues to sleep more than he feels he should. Denies any suicidal or homicidal thoughts. He has a friend that he does go out with and his dog helps his mood. He does have an upcoming appt with Mojelly for low Vanessa Kimberly did cortisone injection and this has relieved his pain at this point.      No results found for: LABA1C          ( goal A1Cis < 7)   No results found for: LABMICR  LDL Cholesterol (mg/dL)   Date Value   07/03/2020 103   02/02/2019 129   03/23/2017 87       (goal LDL is <100)   AST (U/L)   Date Value   03/18/2020 41 (H)     ALT (U/L)   Date Value   03/18/2020 17     BUN (mg/dL)   Date Value   07/03/2020 4 (L)     BP Readings from Last 3 Encounters:   08/12/20 132/78   08/05/20 (!) 159/89   07/20/20 136/74          (goal 120/80)    Past Medical History:   Diagnosis Date    Alcohol abuse     6-12 beers a day; quit drinking July 2016    Arthritis     Back pain, chronic     dr. Ingram Part, orthopedic, every 3-4 months, gets steroid injection    BPH (benign prostatic hypertrophy)     Cholelithiasis     [address] [address]     DDD (degenerative disc disease), lumbar     Fatty liver     GERD (gastroesophageal reflux disease)     Hep C w/o coma, chronic ([facility]     History of colon polyps 2016    [place] (hard of hearing)     Hyperlipidemia     Hypertension     Stomach ulcer     hx of    Tobacco abuse     Tubular adenoma of colon 2016, 2018    Wears glasses       Past Surgical History:   Procedure Laterality Date    BUNIONECTOMY      twice on right side    BUNIONECTOMY Left     CARPAL TUNNEL RELEASE Right     COLONOSCOPY      at age 35    COLONOSCOPY  10/05/2016    polyps-pathology tubular adenoma, and abnormal looking mucosa right colon-pathology-tubular adenoma    COLONOSCOPY N/A 3/30/2018    COLONOSCOPY POLYPECTOMY COLD BIOPSY performed by Richard Huerta MD at [address] [place] [address]  03/30/2018    Small polyp in the sigmoid colon and excised with biopsy forceps--tubular adenoma    ENDOSCOPY, COLON, DIAGNOSTIC      EGD    KNEE SURGERY Left     cyst removed    NASAL SEPTUM SURGERY      OTHER SURGICAL HISTORY  01/04/16    steroid injection C7 T1    OTHER SURGICAL HISTORY  11/21/2016    Bilateral Lumbar CACHORRO L4-L5 injections    OTHER SURGICAL HISTORY  12/19/2016    lumbar steroid injection    OTHER SURGICAL HISTORY  09/28/2018    BILATERAL L5 CACHORRO (N/A Back)    PAIN MANAGEMENT PROCEDURE Left 7/9/2020    EPIDURAL STEROID INJECTION LEFT L4 L5 performed by Harper Horowitz MD at [address] [facility] Left 7/20/2020    LEFT L4 L5 EPIDURAL STEROID INJECTION performed by Harper Horowitz MD at [place] [address] ANES/STEROID [address] ,1 LEVEL Bilateral 9/6/2018    BILATERAL L5 CACHORRO performed by Harper Horowitz MD at [place] [address] ANES/STEROID [address] [facility] ,1 LEVEL N/A 9/28/2018    BILATERAL L5 CACHORRO performed by Harper Horowitz MD at [address] [place] [address] N/CARPAL TUNNEL SURG Right 8/29/2017    CARPAL TUNNEL RELEASE RIGHT performed by Noris Fajardo MD at [address] [place] [address] N/CARPAL TUNNEL SURG Left 10/31/2017    CARPAL TUNNEL RELEASE performed by Noris Fajardo MD at [address] History   Problem Relation Age of Onset    Cancer Mother         pancreatic    Cancer Father         bone    Diabetes Sister     Asthma Brother           Social History     Tobacco Use    Smoking status: Former Smoker     Packs/day: 1.00     Years: 45.00     Pack years: 45.00     Last attempt to quit: 1/17/2017     Years since quitting: 3.5    Smokeless tobacco: Never Used   Substance Use Topics    Alcohol use: No     Comment: 7/17/2016 he stopped drinking         Current Outpatient Medications   Medication Sig Dispense Refill    FLUoxetine (PROZAC) 20 MG capsule Take 1 capsule by mouth daily 90 capsule 0    hydrOXYzine (VISTARIL) 25 MG capsule take 1 capsule by mouth three times a day if needed for anxiety 90 capsule 2    atorvastatin (LIPITOR) 20 MG tablet take 1 tablet by mouth at bedtime 90 tablet 1    spironolactone (ALDACTONE) 50 MG tablet take 1 tablet by mouth once daily 90 tablet 2    vitamin D (ERGOCALCIFEROL) 1.25 MG (28128 UT) CAPS capsule take 1 capsule by mouth every week 12 capsule 1    sildenafil (VIAGRA) 100 MG tablet Take 1 tablet by mouth as needed for Erectile Dysfunction 10 tablet 3    omeprazole (PRILOSEC) 20 MG delayed release capsule Take 20 mg by mouth Daily   0     No current facility-administered medications for this visit. No Known Allergies    Health Maintenance   Topic Date Due    Shingles Vaccine (1 of 2) 08/15/2009    Low dose CT lung screening  08/15/2014    Annual Wellness Visit (AWV)  05/29/2019    Diabetes screen  03/16/2020    Flu vaccine (1) 09/01/2020    Lipid screen  07/03/2021    Potassium monitoring  07/03/2021    Creatinine monitoring  07/03/2021    Colon cancer screen colonoscopy  03/30/2023    DTaP/Tdap/Td vaccine (2 - Td) 04/06/2027    Hepatitis A vaccine  Completed    Hepatitis B vaccine  Completed    Pneumococcal 0-64 years Vaccine  Completed    Hib vaccine  Aged Out    Meningococcal (ACWY) vaccine  Aged Out    HIV screen  Discontinued          Review of Systems   Constitutional: Positive for fatigue. Negative for diaphoresis and fever. HENT: Positive for postnasal drip and sneezing. Eyes: Negative. Respiratory: Negative. Negative for cough, shortness of breath and wheezing. Cardiovascular: Negative. Negative for chest pain and palpitations. with Dr. Garcia Kidney on 8/31    4. Seasonal allergies    Will  some non drowsy allergy medicaiton    No follow-ups on file. Patient given educational materials - see patientinstructions. Discussed use, benefit, and side effects of prescribed medications. All patient questions answered. Pt voiced understanding. Reviewed health maintenance. Instructed to continue current medications, diet and exercise. Patient agreedwith treatment plan. Follow up as directed.      Electronically signed by MASSIMO Mcmullen CNP on 8/12/2020

## 2020-08-12 NOTE — PROGRESS NOTES
Chronic Disease Visit Information    BP Readings from Last 3 Encounters:   08/05/20 (!) 159/89   07/20/20 136/74   07/09/20 (!) 153/82          LDL Cholesterol (mg/dL)   Date Value   07/03/2020 103     HDL (mg/dL)   Date Value   07/03/2020 70     BUN (mg/dL)   Date Value   07/03/2020 4 (L)     CREATININE (mg/dL)   Date Value   07/03/2020 0.58 (L)     Glucose (mg/dL)   Date Value   07/03/2020 120 (H)   04/19/2012 65 (L)            Have you changed or started any medications since your last visit including any over-the-counter medicines, vitamins, or herbal medicines? no   Are you having any side effects from any of your medications? -  no  Have you stopped taking any of your medications? Is so, why? -  no    Have you seen any other physician or provider since your last visit? Yes - Records Obtained  Have you had any other diagnostic tests since your last visit? Yes - Records Obtained  Have you been seen in the emergency room and/or had an admission to a hospital since we last saw you? Yes - Records Obtained  Have you had your annual diabetic retinal (eye) exam? No  Have you had your routine dental cleaning in the past 6 months? no    Have you activated your [employer] account? If not, what are your barriers?  Yes     Patient Care Team:  MASSIMO Suazo CNP as PCP - General (Certified Nurse Practitioner)  MASSIMO Suazo CNP as PCP - [facility] Provider  Bambi Ken MD as Consulting Physician (Pain Management)         Medical History Review  Past Medical, Family, and Social History reviewed and does contribute to the patient presenting condition    Health Maintenance   Topic Date Due    Shingles Vaccine (1 of 2) 08/15/2009    Low dose CT lung screening  08/15/2014    Annual Wellness Visit (AWV)  05/29/2019    Diabetes screen  03/16/2020    Flu vaccine (1) 09/01/2020    Lipid screen  07/03/2021    Potassium monitoring  07/03/2021    Creatinine monitoring  07/03/2021    Colon cancer screen colonoscopy  03/30/2023    DTaP/Tdap/Td vaccine (2 - Td) 04/06/2027    Hepatitis A vaccine  Completed    Hepatitis B vaccine  Completed    Pneumococcal 0-64 years Vaccine  Completed    Hib vaccine  Aged Out    Meningococcal (ACWY) vaccine  Aged Out    HIV screen  Discontinued

## 2020-08-17 ENCOUNTER — APPOINTMENT (OUTPATIENT)
Dept: GENERAL RADIOLOGY | Age: 60
End: 2020-08-17
Attending: ANESTHESIOLOGY
Payer: MEDICARE

## 2020-08-17 ENCOUNTER — HOSPITAL ENCOUNTER (OUTPATIENT)
Age: 60
Setting detail: OUTPATIENT SURGERY
Discharge: HOME | End: 2020-08-17
Attending: ANESTHESIOLOGY | Admitting: ANESTHESIOLOGY
Payer: MEDICARE

## 2020-08-17 VITALS
RESPIRATION RATE: 15 BRPM | BODY MASS INDEX: 33.21 KG/M2 | OXYGEN SATURATION: 96 % | TEMPERATURE: 96.8 F | SYSTOLIC BLOOD PRESSURE: 141 MMHG | HEART RATE: 87 BPM | DIASTOLIC BLOOD PRESSURE: 79 MMHG | HEIGHT: 70 IN | WEIGHT: 232 LBS

## 2020-08-17 PROBLEM — M47.816 LUMBAR FACET JOINT SYNDROME: Chronic | Status: ACTIVE | Noted: 2020-08-17

## 2020-08-17 PROCEDURE — 7100000011 HC PHASE II RECOVERY - ADDTL 15 MIN: Performed by: ANESTHESIOLOGY

## 2020-08-17 PROCEDURE — 2500000003 HC RX 250 WO HCPCS: Performed by: ANESTHESIOLOGY

## 2020-08-17 PROCEDURE — 3209999900 FLUORO FOR SURGICAL PROCEDURES

## 2020-08-17 PROCEDURE — 6360000002 HC RX W HCPCS: Performed by: ANESTHESIOLOGY

## 2020-08-17 PROCEDURE — 3600000051 HC PAIN LEVEL 1 ADDL 15 MIN: Performed by: ANESTHESIOLOGY

## 2020-08-17 PROCEDURE — 7100000010 HC PHASE II RECOVERY - FIRST 15 MIN: Performed by: ANESTHESIOLOGY

## 2020-08-17 PROCEDURE — 64495 INJ PARAVERT F JNT L/S 3 LEV: CPT | Performed by: ANESTHESIOLOGY

## 2020-08-17 PROCEDURE — 99152 MOD SED SAME PHYS/QHP 5/>YRS: CPT | Performed by: ANESTHESIOLOGY

## 2020-08-17 PROCEDURE — 6360000004 HC RX CONTRAST MEDICATION: Performed by: ANESTHESIOLOGY

## 2020-08-17 PROCEDURE — 3600000050 HC PAIN LEVEL 1 BASE: Performed by: ANESTHESIOLOGY

## 2020-08-17 PROCEDURE — 64493 INJ PARAVERT F JNT L/S 1 LEV: CPT | Performed by: ANESTHESIOLOGY

## 2020-08-17 PROCEDURE — 2709999900 HC NON-CHARGEABLE SUPPLY: Performed by: ANESTHESIOLOGY

## 2020-08-17 PROCEDURE — 2580000003 HC RX 258: Performed by: ANESTHESIOLOGY

## 2020-08-17 PROCEDURE — 64494 INJ PARAVERT F JNT L/S 2 LEV: CPT | Performed by: ANESTHESIOLOGY

## 2020-08-17 RX ORDER — SODIUM CHLORIDE 0.9 % (FLUSH) 0.9 %
10 SYRINGE (ML) INJECTION EVERY 12 HOURS SCHEDULED
Status: DISCONTINUED | OUTPATIENT
Start: 2020-08-17 | End: 2020-08-17 | Stop reason: HOSPADM

## 2020-08-17 RX ORDER — FENTANYL CITRATE 50 UG/ML
INJECTION, SOLUTION INTRAMUSCULAR; INTRAVENOUS PRN
Status: DISCONTINUED | OUTPATIENT
Start: 2020-08-17 | End: 2020-08-17 | Stop reason: ALTCHOICE

## 2020-08-17 RX ORDER — LIDOCAINE HYDROCHLORIDE 10 MG/ML
INJECTION, SOLUTION INFILTRATION; PERINEURAL PRN
Status: DISCONTINUED | OUTPATIENT
Start: 2020-08-17 | End: 2020-08-17 | Stop reason: ALTCHOICE

## 2020-08-17 RX ORDER — SODIUM CHLORIDE 0.9 % (FLUSH) 0.9 %
10 SYRINGE (ML) INJECTION PRN
Status: DISCONTINUED | OUTPATIENT
Start: 2020-08-17 | End: 2020-08-17 | Stop reason: HOSPADM

## 2020-08-17 RX ORDER — DEXAMETHASONE SODIUM PHOSPHATE 10 MG/ML
INJECTION INTRAMUSCULAR; INTRAVENOUS PRN
Status: DISCONTINUED | OUTPATIENT
Start: 2020-08-17 | End: 2020-08-17 | Stop reason: ALTCHOICE

## 2020-08-17 RX ORDER — BUPIVACAINE HYDROCHLORIDE 5 MG/ML
INJECTION, SOLUTION EPIDURAL; INTRACAUDAL PRN
Status: DISCONTINUED | OUTPATIENT
Start: 2020-08-17 | End: 2020-08-17 | Stop reason: ALTCHOICE

## 2020-08-17 RX ORDER — MIDAZOLAM HYDROCHLORIDE 1 MG/ML
INJECTION INTRAMUSCULAR; INTRAVENOUS PRN
Status: DISCONTINUED | OUTPATIENT
Start: 2020-08-17 | End: 2020-08-17 | Stop reason: ALTCHOICE

## 2020-08-17 RX ADMIN — Medication 10 ML: at 12:41

## 2020-08-17 ASSESSMENT — PAIN SCALES - GENERAL
PAINLEVEL_OUTOF10: 2
PAINLEVEL_OUTOF10: 0

## 2020-08-17 ASSESSMENT — PAIN DESCRIPTION - DESCRIPTORS: DESCRIPTORS: SHARP;SQUEEZING

## 2020-08-17 ASSESSMENT — PAIN - FUNCTIONAL ASSESSMENT: PAIN_FUNCTIONAL_ASSESSMENT: 0-10

## 2020-08-17 NOTE — H&P
History and Physical Update    Pt Name: Darrick Weinstein  MRN: [id number]  YOB: 1960  Date of evaluation: 8/17/2020      [x] I have reviewed the Pain Management Progress Note by Dr Perla Khoury dated 8/5/20 in epic which meets the criteria for an Interval History and Physical note and is attached below. [x] I have examined  Darrick Weinstein  There are no changes to the patient who is scheduled for a bilateral lumbar facet L2-L5 injections by Dr Perla Khoury for lumbosacral spondylosis. The patient denies health changes, fever, chills, wheezing, cough, increased SOB, chest pain, open sores or wounds. No DM  No use of anticoagulants    Vital signs: BP (!) 151/77   Pulse 87   Temp 97.5 °F (36.4 °C) (Temporal)   Resp 18   Ht 5' 10\" (1.778 m)   Wt 232 lb (105.2 kg)   SpO2 94%   BMI 33.29 kg/m²     Allergies:  Patient has no known allergies. Medications:    Prior to Admission medications    Medication Sig Start Date End Date Taking? Authorizing Provider   FLUoxetine (PROZAC) 20 MG capsule Take 1 capsule by mouth daily 8/12/20   MASSIMO Villalobos CNP   hydrOXYzine (VISTARIL) 25 MG capsule take 1 capsule by mouth three times a day if needed for anxiety 7/28/20   MASSIMO Villalobos CNP   atorvastatin (LIPITOR) 20 MG tablet take 1 tablet by mouth at bedtime 7/1/20   MASSIMO Villalobos CNP   spironolactone (ALDACTONE) 50 MG tablet take 1 tablet by mouth once daily 6/30/20   MASSIMO Villalobos CNP   vitamin D (ERGOCALCIFEROL) 1.25 MG (68644 UT) CAPS capsule take 1 capsule by mouth every week 5/21/20   MASSIMO Self CNP   sildenafil (VIAGRA) 100 MG tablet Take 1 tablet by mouth as needed for Erectile Dysfunction 5/21/20   MASSIMO Self CNP   omeprazole (PRILOSEC) 20 MG delayed release capsule Take 20 mg by mouth Daily  11/13/17   Historical Provider, MD         This is a 63 y.o. male who is pleasant, cooperative, alert and oriented x3, in no acute distress.     Heart: LABA1C  No results found for: EAG     Past Medical History        Past Medical History:   Diagnosis Date    Alcohol abuse       6-12 beers a day; quit drinking July 2016    Arthritis      Back pain, chronic       dr. Montes Left, orthopedic, every 3-4 months, gets steroid injection    BPH (benign prostatic hypertrophy)      Cholelithiasis      Cir[facility] [address]      DDD (degenerative disc disease), lumbar      Fatty liver      GERD (gastroesophageal reflux disease)      Hep C w/o coma, chronic ([facility] [address] [facility]      History of colon polyps 2016    [place]      Hyperlipidemia      Hypertension      Stomach ulcer       hx of    Tobacco abuse      Tubular adenoma of colon 2016, 2018    Wears glasses           Past Surgical History         Past Surgical History:   Procedure Laterality Date    BUNIONECTOMY         twice on right side    BUNIONECTOMY Left      CARPAL TUNNEL RELEASE Right      COLONOSCOPY         at age 35    COLONOSCOPY   10/05/2016     polyps-pathology tubular adenoma, and abnormal looking mucosa right colon-pathology-tubular adenoma    COLONOSCOPY N/A 3/30/2018     COLONOSCOPY POLYPECTOMY COLD BIOPSY performed by Retia Oppenheim, MD at [address] [place] [address]   03/30/2018     Small polyp in the sigmoid colon and excised with biopsy forceps--tubular adenoma    ENDOSCOPY, COLON, DIAGNOSTIC         EGD    KNEE SURGERY Left       cyst removed    NASAL SEPTUM SURGERY        OTHER SURGICAL HISTORY   01/04/16     steroid injection C7 T1    OTHER SURGICAL HISTORY   11/21/2016     Bilateral Lumbar CACHORRO L4-L5 injections    OTHER SURGICAL HISTORY   12/19/2016     lumbar steroid injection    OTHER SURGICAL HISTORY   09/28/2018     BILATERAL L5 CACHORRO (N/A Back)    PAIN MANAGEMENT PROCEDURE Left 7/9/2020     EPIDURAL STEROID INJECTION LEFT L4 L5 performed by Pamella Tamayo MD at [facility] Left 7/20/2020     LEFT L4 L5 EPIDURAL STEROID INJECTION performed by Lluvia Hardin Chloe Trinh MD at [place] [address] ANES/STEROID FORAMEN LUMBAR/SACRAL W IMG GUIDE ,1 LEVEL Bilateral 9/6/2018     BILATERAL L5 CACHORRO performed by Edwardo Loya MD at [place] [address] ANES/STEROID FORAMEN LUMBAR/SACRAL W IMG GUIDE ,1 LEVEL N/A 9/28/2018     BILATERAL L5 CACHORRO performed by Edwardo Loya MD at [address] [place] [address] N/CARPAL TUNNEL SURG Right 8/29/2017     CARPAL TUNNEL RELEASE RIGHT performed by Megan Alcala MD at [address] [place] [address] N/CARPAL TUNNEL SURG Left 10/31/2017     CARPAL TUNNEL RELEASE performed by Megan Alcala MD at [address] [place] [address] Rd History            Socioeconomic History    Marital status: Single       Spouse name: Not on file    Number of children: Not on file    Years of education: Not on file    Highest education level: Not on file   Occupational History    Not on file   Social Needs    Financial resource strain: Not on file    Food insecurity       Worry: Not on file       Inability: Not on file    Transportation needs       Medical: Not on file       Non-medical: Not on file   Tobacco Use    Smoking status: Former Smoker       Packs/day: 1.00       Years: 45.00       Pack years: 45.00       Last attempt to quit: 1/17/2017       Years since quitting: 3.5    Smokeless tobacco: Never Used   Substance and Sexual Activity    Alcohol use: No       Comment: 7/17/2016 he stopped drinking    Drug use: No       Frequency: 1.0 times per week       Comment: cocaine,  stopped spring 2016    Sexual activity: Yes       Partners: Female   Lifestyle    Physical activity       Days per week: Not on file       Minutes per session: Not on file    Stress: Not on file   Relationships    Social connections       Talks on phone: Not on file       Gets together: Not on file       Attends Caodaism service: Not on file       Active member of club or organization: Not on file       Attends meetings of clubs or organizations: Not on file SpO2 95 %. Vital signs are normal.  No fever. Output: Producing urine and producing stool. HEENT: Normal HEENT exam.    Lungs:  Normal effort and normal respiratory rate. Breath sounds clear to auscultation. He is not in respiratory distress. Heart: Normal rate. Extremities: Normal range of motion. There is no deformity. Neurological: Patient is alert and oriented to person, place and time. Patient has normal coordination. Pupils:  Pupils are equal, round, and reactive to light. Pupils are equal.   Skin:  Warm and dry. No rash or cyanosis. Musculoskeletal examination  No apparent deformity and spine is back  Range of motion is preserved  Positive tenderness to palpation over bilateral lumbar paraspinal muscle and facet joint  Facet loading maneuver is positive  Gait is stable     Assessment & Plan      impoved left lumbar radicular pain     Main issue is chronic axial lumbosacral area pain onset many years ago progressively worse and refractory to conservative measures  Imaging showed multilevel lumbar facet arthropathy  Clinical exam suggest facet mediated pain  I will schedule patient for lateral lumbar facet steroid injection     1. Lumbosacral spondylosis without myelopathy    2. S/P epidural steroid injection            Orders Placed This Encounter   Procedures    IA INJ DX/THER AGNT PARAVERT FACET JOINT, LUMBAR/SAC, 1ST LEVEL        Encounter Medications    No orders of the defined types were placed in this encounter.             Electronically signed by Catarino Quevedo MD on 8/5/2020 at 4:20 PM               Revision History

## 2020-08-17 NOTE — OP NOTE
Operative Note      Patient: Yamileth Bernard  YOB: 1960  MRN: [id number]    Date of Procedure: 8/17/2020    Pre-Op Diagnosis: DX LUMBOSACRAL SPONDYLOSIS    Post-Op Diagnosis: Same       Procedure(s):  LUMBAR FACET BILATERAL L2-L5    Surgeon(s):  Lalita Issa MD    Assistant:   * No surgical staff found *    Anesthesia: IV Sedation    Estimated Blood Loss (mL): Minimal    Complications: None    Specimens:   * No specimens in log *    Implants:  * No implants in log *      Drains: * No LDAs found *    Findings: n/a    Detailed Description of Procedure:       Preoperative Diagnosis:    Chronic lower back pain without sciatica  Lumbar facet syndrome      Postoperative Diagnosis:   Chronic lower back pain without sciatica  Lumbar facet syndrome      Blood loss: none    Procedure Performed[de-identified]    Lumbar facet joint injections at the levels of L3-L4, L4-L5 and L5-S1 on the Bilateral side with fluoroscopy guidance, with IV sedation. Procedure:     Using fluoroscopy the facet joints were identified and by adjusting the angle of the fluoroscopy, the view of the joint space was optimized. The skin and deep tissues over the joint space were anesthetized with 1 ml of 1 % lidocaine    The #22-gauge, spinal needle was introduced through the skin wheal under fluoroscopoc guidance such that the tip of the needle lies in the joint space. This was confirmed by injecting about 0.2 ml of Omnipaque-180 through the needle and observing the spread of the contrast along the joint space. Then after negative aspiration 0.7 ml from a treatment mixture made with 4 ml of 0.5% Marcaine with 1 mL of dexamethasone 10 mg/mL was injected into the joint space. Procedure was first performed on right side and was then repeated on the left side at the same level with same technique    A total of 10 mg of dexamethasone was used and was divided in equal amounts among the joints.   After removing the needles Band-Aids were placed over the needle insertion sites. Patient's vital signs remained stable and tolerated the procedure well. The patient was discharged home in stable condition and will be followed in the pain clinic in the next few weeks for further planning. Electronically signed by Cesla Solo MD on 8/17/2020 at 2:49 PM    SEDATION NOTE:    ASA CLASSIFICATION  2  MP   CLASSIFICATION  2    · Moderate intravenous conscious sedation was supervised by Dr. Darrion Hearn  . The patient was independently monitored by a Registered Nurse assigned to the Procedure Room  . Monitoring included automated blood pressure, continuous EKG, Capnography and continuous pulse oximetry. . The detailed Conscious Record is permanently stored in the [place] [id number].      The following is the conscious sedation record;  Start Time:  1428  End times:  1444  Duration:  16 minutes  MEDS GIVEN 2 MG VERSED AND 50 MCG FENTANYL

## 2020-09-30 ENCOUNTER — HOSPITAL ENCOUNTER (OUTPATIENT)
Dept: MRI IMAGING | Age: 60
Discharge: HOME | End: 2020-10-02
Payer: MEDICARE

## 2020-09-30 LAB
BUN BLDV-MCNC: 3 MG/DL (ref 8–23)
CREAT SERPL-MCNC: 0.59 MG/DL (ref 0.7–1.2)
GFR AFRICAN AMERICAN: >60 ML/MIN
GFR NON-AFRICAN AMERICAN: >60 ML/MIN
GFR SERPL CREATININE-BSD FRML MDRD: ABNORMAL ML/MIN/{1.73_M2}
GFR SERPL CREATININE-BSD FRML MDRD: ABNORMAL ML/MIN/{1.73_M2}

## 2020-09-30 PROCEDURE — 70553 MRI BRAIN STEM W/O & W/DYE: CPT

## 2020-09-30 PROCEDURE — 84520 ASSAY OF UREA NITROGEN: CPT

## 2020-09-30 PROCEDURE — 82565 ASSAY OF CREATININE: CPT

## 2020-09-30 PROCEDURE — A9579 GAD-BASE MR CONTRAST NOS,1ML: HCPCS | Performed by: INTERNAL MEDICINE

## 2020-09-30 PROCEDURE — 2580000003 HC RX 258: Performed by: INTERNAL MEDICINE

## 2020-09-30 PROCEDURE — 36415 COLL VENOUS BLD VENIPUNCTURE: CPT

## 2020-09-30 PROCEDURE — 6360000004 HC RX CONTRAST MEDICATION: Performed by: INTERNAL MEDICINE

## 2020-09-30 RX ORDER — SODIUM CHLORIDE 0.9 % (FLUSH) 0.9 %
10 SYRINGE (ML) INJECTION PRN
Status: DISCONTINUED | OUTPATIENT
Start: 2020-09-30 | End: 2020-10-03 | Stop reason: HOSPADM

## 2020-09-30 RX ADMIN — GADOTERIDOL 20 ML: 279.3 INJECTION, SOLUTION INTRAVENOUS at 11:16

## 2020-09-30 RX ADMIN — Medication 10 ML: at 11:16

## 2020-10-30 RX ORDER — ERGOCALCIFEROL 1.25 MG/1
CAPSULE ORAL
Qty: 12 CAPSULE | Refills: 1 | OUTPATIENT
Start: 2020-10-30

## 2020-11-03 ENCOUNTER — OFFICE VISIT (OUTPATIENT)
Dept: PAIN MANAGEMENT | Age: 60
End: 2020-11-03
Payer: MEDICARE

## 2020-11-03 VITALS
HEART RATE: 82 BPM | WEIGHT: 234 LBS | BODY MASS INDEX: 33.5 KG/M2 | OXYGEN SATURATION: 95 % | HEIGHT: 70 IN | SYSTOLIC BLOOD PRESSURE: 121 MMHG | DIASTOLIC BLOOD PRESSURE: 66 MMHG

## 2020-11-03 PROCEDURE — G8417 CALC BMI ABV UP PARAM F/U: HCPCS | Performed by: ANESTHESIOLOGY

## 2020-11-03 PROCEDURE — 1036F TOBACCO NON-USER: CPT | Performed by: ANESTHESIOLOGY

## 2020-11-03 PROCEDURE — 3017F COLORECTAL CA SCREEN DOC REV: CPT | Performed by: ANESTHESIOLOGY

## 2020-11-03 PROCEDURE — 99214 OFFICE O/P EST MOD 30 MIN: CPT | Performed by: ANESTHESIOLOGY

## 2020-11-03 PROCEDURE — G8427 DOCREV CUR MEDS BY ELIG CLIN: HCPCS | Performed by: ANESTHESIOLOGY

## 2020-11-03 PROCEDURE — G8484 FLU IMMUNIZE NO ADMIN: HCPCS | Performed by: ANESTHESIOLOGY

## 2020-11-03 RX ORDER — TESTOSTERONE 4 MG/D
PATCH TRANSDERMAL
COMMUNITY
Start: 2020-10-28 | End: 2021-03-18 | Stop reason: ALTCHOICE

## 2020-11-03 ASSESSMENT — ENCOUNTER SYMPTOMS
BACK PAIN: 1
RESPIRATORY NEGATIVE: 1

## 2020-11-03 NOTE — PROGRESS NOTES
 BUNIONECTOMY      twice on right side    BUNIONECTOMY Left     CARPAL TUNNEL RELEASE Right     COLONOSCOPY      at age 35    COLONOSCOPY  10/05/2016    polyps-pathology tubular adenoma, and abnormal looking mucosa right colon-pathology-tubular adenoma    COLONOSCOPY N/A 3/30/2018    COLONOSCOPY POLYPECTOMY COLD BIOPSY performed by Bridget Booth MD at [place] [address]  03/30/2018    Small polyp in the sigmoid colon and excised with biopsy forceps--tubular adenoma    ENDOSCOPY, COLON, DIAGNOSTIC      EGD    KNEE SURGERY Left     cyst removed    NASAL SEPTUM SURGERY      OTHER SURGICAL HISTORY  01/04/16    steroid injection C7 T1    OTHER SURGICAL HISTORY  11/21/2016    Bilateral Lumbar CACHORRO L4-L5 injections    OTHER SURGICAL HISTORY  12/19/2016    lumbar steroid injection    OTHER SURGICAL HISTORY  09/28/2018    BILATERAL L5 CACHORRO (N/A Back)    PAIN MANAGEMENT PROCEDURE Left 7/9/2020    EPIDURAL STEROID INJECTION LEFT L4 L5 performed by Sabas Kennedy MD at [facility] Left 7/20/2020    LEFT L4 L5 EPIDURAL STEROID INJECTION performed by Sabas Kennedy MD at [address] [place] [address] Bilateral 8/17/2020    LUMBAR FACET BILATERAL L2-L5 performed by Sabas Kennedy MD at [place] [address] ANES/STEROID [facility] ,1 LEVEL Bilateral 9/6/2018    BILATERAL L5 CACHORRO performed by Sabas Kennedy MD at [place] [address] ANES/STEROID [facility] ,1 LEVEL N/A 9/28/2018    BILATERAL L5 CACHORRO performed by Sabas Kennedy MD at [facility]/CARPAL [facility] Right 8/29/2017    CARPAL TUNNEL RELEASE RIGHT performed by Joy Coughlin MD at [facility]/CARPAL TUNNEL SURG Left 10/31/2017    CARPAL TUNNEL RELEASE performed by Joy Coughlin MD at [address] [place] [address] Marital status: Single     Spouse name: None    Number of children: None    Years of education: None    Highest education level: None   Occupational History    None   Social Needs    Financial resource strain: None    Food insecurity     Worry: None     Inability: None    Transportation needs     Medical: None     Non-medical: None   Tobacco Use    Smoking status: Former Smoker     Packs/day: 1.00     Years: 45.00     Pack years: 45.00     Last attempt to quit: 1/17/2017     Years since quitting: 3.7    Smokeless tobacco: Never Used   Substance and Sexual Activity    Alcohol use: No     Comment: 7/17/2016 he stopped drinking    Drug use: No     Frequency: 1.0 times per week     Comment: cocaine,  stopped spring 2016    Sexual activity: Yes     Partners: Female   Lifestyle    Physical activity     Days per week: None     Minutes per session: None    Stress: None   Relationships    Social connections     Talks on phone: None     Gets together: None     Attends Episcopalian service: None     Active member of club or organization: None     Attends meetings of clubs or organizations: None     Relationship status: None    Intimate partner violence     Fear of current or ex partner: None     Emotionally abused: None     Physically abused: None     Forced sexual activity: None   Other Topics Concern    None   Social History Narrative     in the past, retired     Family History   Problem Relation Age of Onset    Cancer Mother         pancreatic    Cancer Father         bone    Diabetes Sister     Asthma Brother      No Known Allergies  Patient has no known allergies.    Vitals:    11/03/20 1539   BP: 121/66   Pulse: 82   SpO2: 95%     Current Outpatient Medications   Medication Sig Dispense Refill    ANDRODERM 4 MG/24HR PT24 APPLY 1 PATCH TO SHOULDER EVERY DAY      FLUoxetine (PROZAC) 20 MG capsule Take 1 capsule by mouth daily 90 capsule 0    hydrOXYzine (VISTARIL) 25 MG capsule take 1 capsule by mouth three times a day if needed for anxiety 90 capsule 2    atorvastatin (LIPITOR) 20 MG tablet take 1 tablet by mouth at bedtime 90 tablet 1    spironolactone (ALDACTONE) 50 MG tablet take 1 tablet by mouth once daily 90 tablet 2    vitamin D (ERGOCALCIFEROL) 1.25 MG (75815 UT) CAPS capsule take 1 capsule by mouth every week 12 capsule 1    sildenafil (VIAGRA) 100 MG tablet Take 1 tablet by mouth as needed for Erectile Dysfunction 10 tablet 3     No current facility-administered medications for this visit. Review of Systems   Constitutional: Negative. Negative for fever. Respiratory: Negative. Musculoskeletal: Positive for back pain and neck pain. Neurological: Positive for headaches. Objective:  General Appearance:  Well-appearing and in no acute distress. Vital signs: (most recent): Blood pressure 121/66, pulse 82, height 5' 10\" (1.778 m), weight 234 lb (106.1 kg), SpO2 95 %. Vital signs are normal.  No fever. Output: Producing urine and producing stool. HEENT: Normal HEENT exam.    Lungs:  Normal effort and normal respiratory rate. Breath sounds clear to auscultation. He is not in respiratory distress. Heart: Normal rate. Extremities: Normal range of motion. There is no deformity. Neurological: Patient is alert and oriented to person, place and time. Patient has normal coordination. Pupils:  Pupils are equal, round, and reactive to light. Pupils are equal.   Skin:  Warm and dry. No rash or cyanosis. Spine examination  Cervical spine examination  No apparent deformity on inspection  Range of motion is limited and associated with pain  Positive tenderness to palpation over right cervical facet joints with reproduction of his typical pain    Lumbar spine examination  No apparent deformity inspection range of motion is limited and associated with pain  Positive tenderness to palpation over bilateral lower lumbar paraspinal muscle and facet joint  Facet loading maneuvers positive    Gait stable    Assessment & Plan  1.  Lumbar facet arthropathy    2. Lumbar facet joint syndrome    3. Chronic bilateral low back pain with left-sided sciatica    4. Cervical facet syndrome        Neck right-sided neck pain associated with occipital headache  Clinical presentation suggest facet mediated pain  I will recommend for right-sided cervical facet injection    Chronic axial lumbar spinal pain  Onset many years ago progressively worsened affecting daily life activity  Refractory to conservative measures  Patient had excellent response with the 100% relief for 2 months with a lumbar facet injection  I will recommend for a second diagnostic lumbar medial branch nerve block  If this provides good short-term relief then he will be a candidate for radiofrequency ablation      Orders Placed This Encounter   Procedures    DC INJ DX/THER AGNT PARAVERT FACET JOINT, LUMBAR/SAC, 1ST LEVEL    DC INJ DX/THER AGNT PARAVERT FACET JOINT, CERV/THORAC, 1ST LEVEL      No orders of the defined types were placed in this encounter.          Electronically signed by Juliana Self MD on 11/3/2020 at 4:06 PM

## 2020-11-23 ENCOUNTER — APPOINTMENT (OUTPATIENT)
Dept: GENERAL RADIOLOGY | Age: 60
End: 2020-11-23
Attending: ANESTHESIOLOGY
Payer: MEDICARE

## 2020-11-23 ENCOUNTER — HOSPITAL ENCOUNTER (OUTPATIENT)
Age: 60
Setting detail: OUTPATIENT SURGERY
Discharge: HOME | End: 2020-11-23
Attending: ANESTHESIOLOGY | Admitting: ANESTHESIOLOGY
Payer: MEDICARE

## 2020-11-23 VITALS
HEIGHT: 70 IN | HEART RATE: 86 BPM | SYSTOLIC BLOOD PRESSURE: 136 MMHG | TEMPERATURE: 97.2 F | WEIGHT: 220 LBS | OXYGEN SATURATION: 92 % | RESPIRATION RATE: 15 BRPM | BODY MASS INDEX: 31.5 KG/M2 | DIASTOLIC BLOOD PRESSURE: 121 MMHG

## 2020-11-23 PROBLEM — M47.812 CERVICAL FACET SYNDROME: Chronic | Status: ACTIVE | Noted: 2020-11-23

## 2020-11-23 PROCEDURE — 7100000010 HC PHASE II RECOVERY - FIRST 15 MIN: Performed by: ANESTHESIOLOGY

## 2020-11-23 PROCEDURE — 2580000003 HC RX 258: Performed by: ANESTHESIOLOGY

## 2020-11-23 PROCEDURE — 99152 MOD SED SAME PHYS/QHP 5/>YRS: CPT | Performed by: ANESTHESIOLOGY

## 2020-11-23 PROCEDURE — 2709999900 HC NON-CHARGEABLE SUPPLY: Performed by: ANESTHESIOLOGY

## 2020-11-23 PROCEDURE — 6360000004 HC RX CONTRAST MEDICATION: Performed by: ANESTHESIOLOGY

## 2020-11-23 PROCEDURE — 6360000002 HC RX W HCPCS: Performed by: ANESTHESIOLOGY

## 2020-11-23 PROCEDURE — 3209999900 FLUORO FOR SURGICAL PROCEDURES

## 2020-11-23 PROCEDURE — 7100000011 HC PHASE II RECOVERY - ADDTL 15 MIN: Performed by: ANESTHESIOLOGY

## 2020-11-23 PROCEDURE — 3600000051 HC PAIN LEVEL 1 ADDL 15 MIN: Performed by: ANESTHESIOLOGY

## 2020-11-23 PROCEDURE — 64490 INJ PARAVERT F JNT C/T 1 LEV: CPT | Performed by: ANESTHESIOLOGY

## 2020-11-23 PROCEDURE — 64492 INJ PARAVERT F JNT C/T 3 LEV: CPT | Performed by: ANESTHESIOLOGY

## 2020-11-23 PROCEDURE — 3600000050 HC PAIN LEVEL 1 BASE: Performed by: ANESTHESIOLOGY

## 2020-11-23 PROCEDURE — 2500000003 HC RX 250 WO HCPCS: Performed by: ANESTHESIOLOGY

## 2020-11-23 PROCEDURE — 64491 INJ PARAVERT F JNT C/T 2 LEV: CPT | Performed by: ANESTHESIOLOGY

## 2020-11-23 RX ORDER — BUPIVACAINE HYDROCHLORIDE 5 MG/ML
INJECTION, SOLUTION EPIDURAL; INTRACAUDAL PRN
Status: DISCONTINUED | OUTPATIENT
Start: 2020-11-23 | End: 2020-11-23 | Stop reason: ALTCHOICE

## 2020-11-23 RX ORDER — MIDAZOLAM HYDROCHLORIDE 1 MG/ML
INJECTION INTRAMUSCULAR; INTRAVENOUS PRN
Status: DISCONTINUED | OUTPATIENT
Start: 2020-11-23 | End: 2020-11-23 | Stop reason: ALTCHOICE

## 2020-11-23 RX ORDER — SODIUM CHLORIDE 0.9 % (FLUSH) 0.9 %
10 SYRINGE (ML) INJECTION EVERY 12 HOURS SCHEDULED
Status: DISCONTINUED | OUTPATIENT
Start: 2020-11-23 | End: 2020-11-23 | Stop reason: HOSPADM

## 2020-11-23 RX ORDER — FENTANYL CITRATE 50 UG/ML
INJECTION, SOLUTION INTRAMUSCULAR; INTRAVENOUS PRN
Status: DISCONTINUED | OUTPATIENT
Start: 2020-11-23 | End: 2020-11-23 | Stop reason: ALTCHOICE

## 2020-11-23 RX ORDER — SODIUM CHLORIDE 0.9 % (FLUSH) 0.9 %
10 SYRINGE (ML) INJECTION PRN
Status: DISCONTINUED | OUTPATIENT
Start: 2020-11-23 | End: 2020-11-23 | Stop reason: HOSPADM

## 2020-11-23 RX ORDER — DEXAMETHASONE SODIUM PHOSPHATE 10 MG/ML
INJECTION INTRAMUSCULAR; INTRAVENOUS PRN
Status: DISCONTINUED | OUTPATIENT
Start: 2020-11-23 | End: 2020-11-23 | Stop reason: ALTCHOICE

## 2020-11-23 RX ADMIN — SODIUM CHLORIDE, PRESERVATIVE FREE 10 ML: 5 INJECTION INTRAVENOUS at 14:15

## 2020-11-23 ASSESSMENT — PAIN SCALES - GENERAL
PAINLEVEL_OUTOF10: 5
PAINLEVEL_OUTOF10: 5
PAINLEVEL_OUTOF10: 0

## 2020-11-23 ASSESSMENT — PAIN - FUNCTIONAL ASSESSMENT: PAIN_FUNCTIONAL_ASSESSMENT: 0-10

## 2020-11-23 ASSESSMENT — PAIN DESCRIPTION - DESCRIPTORS: DESCRIPTORS: ACHING;DISCOMFORT;CONSTANT

## 2020-11-23 NOTE — H&P
History and Physical Update    Pt Name: Haven Bear  MRN: [id number]  YOB: 1960  Date of evaluation: 11/23/2020      [x] I have reviewed the PAIN MANAGEMENT PROGRESS NOTE OF 11/03/2020 BY   which meets the criteria for an Interval History and Physical note COPIED BELOW. .    [x] I have examined  Haven Bear  There are no changes to the patient who is scheduled for a BILATERAL LUMBAR MEDIAL BRANCH NERVE BLOCK AT L 2 -L 5 . The patient denies new health changes, fever, chills, wheezing, cough, increased SOB, chest pain, open sores or wounds. Vital signs: BP (!) 143/74   Pulse 89   Temp 96.9 °F (36.1 °C) (Temporal)   Resp 20   Ht 5' 10\" (1.778 m)   Wt 220 lb (99.8 kg)   SpO2 96%   BMI 31.57 kg/m²     Allergies:  Patient has no known allergies. Medications:    Prior to Admission medications    Medication Sig Start Date End Date Taking? Authorizing Provider   FLUoxetine (PROZAC) 20 MG capsule take 1 capsule by mouth once daily 11/6/20  Yes MASSIMO Coleman CNP   ANDRODERM 4 MG/24HR PT24 APPLY 1 PATCH TO SHOULDER EVERY DAY 10/28/20  Yes Historical Provider, MD   hydrOXYzine (VISTARIL) 25 MG capsule take 1 capsule by mouth three times a day if needed for anxiety 7/28/20  Yes MASSIMO Coleman CNP   atorvastatin (LIPITOR) 20 MG tablet take 1 tablet by mouth at bedtime 7/1/20  Yes MASSIMO Coleman CNP   spironolactone (ALDACTONE) 50 MG tablet take 1 tablet by mouth once daily 6/30/20  Yes MASSIMO Coleman CNP   vitamin D (ERGOCALCIFEROL) 1.25 MG (48967 UT) CAPS capsule take 1 capsule by mouth every week 5/21/20  Yes MASSIMO Coleman CNP   sildenafil (VIAGRA) 100 MG tablet Take 1 tablet by mouth as needed for Erectile Dysfunction 5/21/20  Yes MASSIMO Coyne CNP         This is a 63 y.o. male who is pleasant, cooperative, alert and oriented x3, in no acute distress.     Heart: Heart sounds are normal.  HR ***regular rate and rhythm without murmur, BILATERAL L5 CACHORRO (N/A Back)    PAIN MANAGEMENT PROCEDURE Left 7/9/2020     EPIDURAL STEROID INJECTION LEFT L4 L5 performed by Berenice Vinson MD at [address] [place] [address] Left 7/20/2020     LEFT L4 L5 EPIDURAL STEROID INJECTION performed by Berenice Vinson MD at [address] [place] [address] Bilateral 8/17/2020     LUMBAR FACET BILATERAL L2-L5 performed by Berenice Vinson MD at [place] [address] ANES/STEROID FORAMEN LUMBAR/SACRAL W IMG GUIDE ,1 LEVEL Bilateral 9/6/2018     BILATERAL L5 CACHORRO performed by Berenice Vinson MD at [place] [address] ANES/STEROID [address] [facility] ,1 LEVEL N/A 9/28/2018     BILATERAL L5 CACHORRO performed by Berenice Vinson MD at [address] [place] [address] N/CARPAL TUNNEL SURG Right 8/29/2017     CARPAL TUNNEL RELEASE RIGHT performed by Semaj Ramires MD at [address] [place] [address] N/CARPAL TUNNEL SURG Left 10/31/2017     CARPAL TUNNEL RELEASE performed by Semaj Ramires MD at [address] [place] [address] Rd History            Socioeconomic History    Marital status: Single       Spouse name: None    Number of children: None    Years of education: None    Highest education level: None   Occupational History    None   Social Needs    Financial resource strain: None    Food insecurity       Worry: None       Inability: None    Transportation needs       Medical: None       Non-medical: None   Tobacco Use    Smoking status: Former Smoker       Packs/day: 1.00       Years: 45.00       Pack years: 45.00       Last attempt to quit: 1/17/2017       Years since quitting: 3.7    Smokeless tobacco: Never Used   Substance and Sexual Activity    Alcohol use: No       Comment: 7/17/2016 he stopped drinking    Drug use: No       Frequency: 1.0 times per week       Comment: cocaine,  stopped spring 2016    Sexual activity: Yes       Partners: Female   Lifestyle    Physical activity       Days per week: None       Minutes per session: None  Stress: None   Relationships    Social connections       Talks on phone: None       Gets together: None       Attends Jainism service: None       Active member of club or organization: None       Attends meetings of clubs or organizations: None       Relationship status: None    Intimate partner violence       Fear of current or ex partner: None       Emotionally abused: None       Physically abused: None       Forced sexual activity: None   Other Topics Concern    None   Social History Narrative      in the past, retired        Family History         Family History   Problem Relation Age of Onset    Cancer Mother           pancreatic    Cancer Father           bone    Diabetes Sister      Asthma Brother          No Known Allergies  Patient has no known allergies. Vitals:     11/03/20 1539   BP: 121/66   Pulse: 82   SpO2: 95%      Current Facility-Administered Medications          Current Outpatient Medications   Medication Sig Dispense Refill    ANDRODERM 4 MG/24HR PT24 APPLY 1 PATCH TO SHOULDER EVERY DAY        FLUoxetine (PROZAC) 20 MG capsule Take 1 capsule by mouth daily 90 capsule 0    hydrOXYzine (VISTARIL) 25 MG capsule take 1 capsule by mouth three times a day if needed for anxiety 90 capsule 2    atorvastatin (LIPITOR) 20 MG tablet take 1 tablet by mouth at bedtime 90 tablet 1    spironolactone (ALDACTONE) 50 MG tablet take 1 tablet by mouth once daily 90 tablet 2    vitamin D (ERGOCALCIFEROL) 1.25 MG (88850 UT) CAPS capsule take 1 capsule by mouth every week 12 capsule 1    sildenafil (VIAGRA) 100 MG tablet Take 1 tablet by mouth as needed for Erectile Dysfunction 10 tablet 3      No current facility-administered medications for this visit. Review of Systems   Constitutional: Negative. Negative for fever. Respiratory: Negative. Musculoskeletal: Positive for back pain and neck pain. Neurological: Positive for headaches.        Objective:  General Appearance: relief then he will be a candidate for radiofrequency ablation            Orders Placed This Encounter   Procedures    [place] INJ DX/THER AGNT PARAVERT FACET JOINT, LUMBAR/SAC, 1ST LEVEL    [place] INJ DX/THER AGNT PARAVERT FACET JOINT, CERV/THORAC, 1ST LEVEL        Encounter Medications    No orders of the defined types were placed in this encounter.             Electronically signed by Berenice Vinson MD on 11/3/2020 at 4:06 PM               Revision History

## 2020-11-23 NOTE — H&P
History and Physical Update    Pt Name: Salvador Sarah  MRN: [id number]  YOB: 1960  Date of evaluation: 11/23/2020      [x] I have reviewed the Pain Management Progress Ntoe by Dr Janny Bowman dated 11/3/20 in [facility] which meets the criteria for an Interval History and Physical note and is attached below. [x] I have examined  Salvador Sarah  There are no changes to the patient who is scheduled for a nerve block right cervical steroid injection C3-C6 by Dr Janny Bowman for cervical facet syndrome  The patient denies new health changes, fever, chills, wheezing, cough, increased SOB, chest pain, open sores or wounds. No Dm or use of anticoagulants    PMH, Surgical History, Social History, Psych, and Family History reviewed and updated in EPIC in appropriate section. Vital signs: BP (!) 143/74   Pulse 89   Temp 96.9 °F (36.1 °C) (Temporal)   Resp 20   Ht 5' 10\" (1.778 m)   Wt 220 lb (99.8 kg)   SpO2 96%   BMI 31.57 kg/m²     Allergies:  Patient has no known allergies. Medications:    Prior to Admission medications    Medication Sig Start Date End Date Taking?  Authorizing Provider   FLUoxetine (PROZAC) 20 MG capsule take 1 capsule by mouth once daily 11/6/20  Yes MASSIMO Coleman CNP   ANDRODERM 4 MG/24HR PT24 APPLY 1 PATCH TO SHOULDER EVERY DAY 10/28/20  Yes Historical Provider, MD   hydrOXYzine (VISTARIL) 25 MG capsule take 1 capsule by mouth three times a day if needed for anxiety 7/28/20  Yes MASSIMO Coleman CNP   atorvastatin (LIPITOR) 20 MG tablet take 1 tablet by mouth at bedtime 7/1/20  Yes MASSMIO Coleman CNP   spironolactone (ALDACTONE) 50 MG tablet take 1 tablet by mouth once daily 6/30/20  Yes MASSIMO Coleman CNP   vitamin D (ERGOCALCIFEROL) 1.25 MG (21794 UT) CAPS capsule take 1 capsule by mouth every week 5/21/20  Yes MASSIMO Coleman CNP   sildenafil (VIAGRA) 100 MG tablet Take 1 tablet by mouth as needed for Erectile Dysfunction 5/21/20  Yes Maureen FELIX Reynaldo Pena CNP         This is a 63 y.o. male who is pleasant, cooperative, alert and oriented x3, in no acute distress. Heart: Heart sounds are normal.  HR 89 regular rate and rhythm without murmur, gallop or rub. Lungs: Normal respiratory effort with equal expansion, good air exchange, unlabored and without wheezes or rales bilaterally   Abdomen: round, nontender, nondistended with bowel sounds  Neuro: equal bilateral upper and lower extremity strength. Labs:  No results for input(s): HGB, HCT, WBC, MCV, PLT, NA, K, CL, CO2, BUN, CREATININE, GLUCOSE, INR, PROTIME, APTT, AST, ALT, LABALBU, HCG in the last 720 hours. No results for input(s): COVID19 in the last 720 hours. Jeanne KEYS, ANP-BC  Electronically signed 11/23/2020 at 2:30 PM        Jo Jorge MD    Physician    Specialty:  Pain Management    Progress Notes    Signed    Encounter Date:  11/3/2020          Related encounter: Office Visit from 11/3/2020 in [facility] [place]          Signed        Expand All Collapse All      The patient is a 63 y. o. Non-/non  male.      Chief Complaint   Patient presents with    Back Pain    Follow Up After Procedure    Neck Pain         HPI     Lower back pain  Pain is chronic onset many years ago  Predominantly axial lumbar spinal pain affecting both side  In past he had radiation pain in left leg with left leg numbness and tingling which responded well to epidural injection  For axial lower back pain he failed conservative measures  We did a lumbar facet injection in August  Patient report today that he had 100% relief for 2 months and then the pain came back     Neck pain  Pain is chronic onset many years ago  Have progressively worsened over time  Affecting mainly right side  Associated with occipital headache on right side  No radiation in arm  No associated numbness or tingling in arm  No changes in bladder or bowel control  Pain aggravated with neck movement  Alleviating factors include massage  Pain affect daily life activities        S/P:LUMBAR FACET BILATERAL L2-L5  DOS 8/17/2020           Outcome              Any improvement of activity?   Yes              Any side effects (appetite,leg cramping,facial fleshing):none              Increase of pain:  No  Pain score Today:  0  % of pain relief:100%  Pain diary (medial branch block): No        Past Medical History        Past Medical History:   Diagnosis Date    Alcohol abuse       6-12 beers a day; quit drinking July 2016    Arthritis      Back pain, chronic       dr. Gael Marti, orthopedic, every 3-4 months, gets steroid injection    BPH (benign prostatic hypertrophy)      Cholelithiasis      Cirrhosis ([facility] [address]      DDD (degenerative disc disease), lumbar      Fatty liver      GERD (gastroesophageal reflux disease)      Hep C w/o coma, chronic (HCC)      History of colon polyps 2016    [place]      Hyperlipidemia      Hypertension      Stomach ulcer       hx of    Tobacco abuse      Tubular adenoma of colon 2016, 2018    Wears glasses           Past Surgical History         Past Surgical History:   Procedure Laterality Date    BUNIONECTOMY         twice on right side    BUNIONECTOMY Left      CARPAL TUNNEL RELEASE Right      COLONOSCOPY         at age 35    COLONOSCOPY   10/05/2016     polyps-pathology tubular adenoma, and abnormal looking mucosa right colon-pathology-tubular adenoma    COLONOSCOPY N/A 3/30/2018     COLONOSCOPY POLYPECTOMY COLD BIOPSY performed by Shelley Griffith MD at [place] [address]   03/30/2018     Small polyp in the sigmoid colon and excised with biopsy forceps--tubular adenoma    ENDOSCOPY, COLON, DIAGNOSTIC         EGD    KNEE SURGERY Left       cyst removed    NASAL SEPTUM SURGERY        OTHER SURGICAL HISTORY   01/04/16     steroid injection C7 T1    OTHER SURGICAL HISTORY   11/21/2016     Bilateral Lumbar CACHORRO L4-L5 injections    OTHER SURGICAL HISTORY   12/19/2016     lumbar steroid injection    OTHER SURGICAL HISTORY   09/28/2018     BILATERAL L5 CACHORRO (N/A Back)    PAIN MANAGEMENT PROCEDURE Left 7/9/2020     EPIDURAL STEROID INJECTION LEFT L4 L5 performed by Ezekiel Walters MD at [address] [place] [address] Left 7/20/2020     LEFT L4 L5 EPIDURAL STEROID INJECTION performed by Ezekiel Walters MD at [address] [place] [address] Bilateral 8/17/2020     LUMBAR FACET BILATERAL L2-L5 performed by Ezekiel Walters MD at [place] [address] ANES/STEROID FORAMEN LUMBAR/SACRAL [address] [place] [address] ,1 LEVEL Bilateral 9/6/2018     BILATERAL L5 CACHORRO performed by Ezekiel Walters MD at [place] [address] ANES/STEROID [address] [facility] ,1 LEVEL N/A 9/28/2018     BILATERAL L5 CACHORRO performed by Ezekiel Walters MD at [address] [place] [address] N/CARPAL TUNNEL SURG Right 8/29/2017     CARPAL TUNNEL RELEASE RIGHT performed by Alyse Bolaños MD at [address] [place] [address] N/CARPAL TUNNEL SURG Left 10/31/2017     CARPAL TUNNEL RELEASE performed by Alyse Bolaños MD at [place] [address] History   Social History            Socioeconomic History    Marital status: Single       Spouse name: None    Number of children: None    Years of education: None    Highest education level: None   Occupational History    None   Social Needs    Financial resource strain: None    Food insecurity       Worry: None       Inability: None    Transportation needs       Medical: None       Non-medical: None   Tobacco Use    Smoking status: Former Smoker       Packs/day: 1.00       Years: 45.00       Pack years: 45.00       Last attempt to quit: 1/17/2017       Years since quitting: 3.7    Smokeless tobacco: Never Used   Substance and Sexual Activity    Alcohol use: No       Comment: 7/17/2016 he stopped drinking    Drug use: No       Frequency: 1.0 times per week       Comment: cocaine,  stopped spring 2016    Sexual activity: Yes Partners: Female   Lifestyle    Physical activity       Days per week: None       Minutes per session: None    Stress: None   Relationships    Social connections       Talks on phone: None       Gets together: None       Attends Confucianism service: None       Active member of club or organization: None       Attends meetings of clubs or organizations: None       Relationship status: None    Intimate partner violence       Fear of current or ex partner: None       Emotionally abused: None       Physically abused: None       Forced sexual activity: None   Other Topics Concern    None   Social History Narrative      in the past, retired        Family History         Family History   Problem Relation Age of Onset    Cancer Mother           pancreatic    Cancer Father           bone    Diabetes Sister      Asthma Brother          No Known Allergies  Patient has no known allergies. Vitals:     11/03/20 1539   BP: 121/66   Pulse: 82   SpO2: 95%      Current Facility-Administered Medications          Current Outpatient Medications   Medication Sig Dispense Refill    ANDRODERM 4 MG/24HR PT24 APPLY 1 PATCH TO SHOULDER EVERY DAY        FLUoxetine (PROZAC) 20 MG capsule Take 1 capsule by mouth daily 90 capsule 0    hydrOXYzine (VISTARIL) 25 MG capsule take 1 capsule by mouth three times a day if needed for anxiety 90 capsule 2    atorvastatin (LIPITOR) 20 MG tablet take 1 tablet by mouth at bedtime 90 tablet 1    spironolactone (ALDACTONE) 50 MG tablet take 1 tablet by mouth once daily 90 tablet 2    vitamin D (ERGOCALCIFEROL) 1.25 MG (55733 UT) CAPS capsule take 1 capsule by mouth every week 12 capsule 1    sildenafil (VIAGRA) 100 MG tablet Take 1 tablet by mouth as needed for Erectile Dysfunction 10 tablet 3      No current facility-administered medications for this visit. Review of Systems   Constitutional: Negative. Negative for fever. Respiratory: Negative.     Musculoskeletal: injection  I will recommend for a second diagnostic lumbar medial branch nerve block  If this provides good short-term relief then he will be a candidate for radiofrequency ablation            Orders Placed This Encounter   Procedures    DC INJ DX/THER AGNT PARAVERT FACET JOINT, LUMBAR/SAC, 1ST LEVEL    DC INJ DX/THER AGNT PARAVERT FACET JOINT, CERV/THORAC, 1ST LEVEL        Encounter Medications    No orders of the defined types were placed in this encounter.             Electronically signed by Dorcas Puri MD on 11/3/2020 at 4:06 PM               Revision History

## 2020-11-23 NOTE — OP NOTE
Operative Note      Patient: Ana Maria Dennis  YOB: 1960  MRN: [id number]    Date of Procedure: 11/23/2020    Pre-Op Diagnosis: DX CERVICAL FACET SYNDROME    Post-Op Diagnosis: Same       Procedure(s):  NERVE BLOCK RIGHT CERVICAL STEROID INJECTION  C3-C6    Surgeon(s):  Nura Najera MD    Assistant:   * No surgical staff found *    Anesthesia: IV Sedation    Estimated Blood Loss (mL): Minimal    Complications: None    Specimens:   * No specimens in log *    Implants:  * No implants in log *      Drains: * No LDAs found *    Findings: N/A    Detailed Description of Procedure:   Preoperative Diagnosis: Cervical spondylosis and chronic cervicalgia  Postoperative Diagnosis: Cervical spondylosis and chronic cervicalgia    Procedure Performed:  RIGHT Cervical Facet joint steroid injection at C2/3, C3/4 and C4/5 under fluoroscopy guidance    BLOOD LOSS: NONE   Procedure: The Patient was seen in the preop area, chart was reviewed, informed consent was obtained. Patient was taken to procedure room and was placed in lateral position with procedure side facing upward. . Vital signs were monitored through out the  Procedure. A time out was completed. The site was prepped and draped in sterile manner. The target point was identified with fluoro guidance. Skin and deep tissues were anesthetized with 1 % lidocaine. A  25-gauge needlele was advanced under fluoroscopy guidance to the targets until a bony contact was made. Position was conformed in AP and lateral views. Then after negative aspiration contrast dye was injected that showed  spread of the contrast over the target area with no epidural, vascular runoff or intrathecal spread. Finally 0. 5 ml of treatment solution containing 2 ml of 1% PF lidocaine and 1 ml of Celestone 6 mg/ml was injected at each spot. The needle was removed and a Band-Aid was placed over the needle  insertion site.   The patient's vital signs remained stable and the patient tolerated the procedure well. Electronically signed by Konstantin Pulido MD on 11/23/2020 at 2:53 PM  SEDATION NOTE:    ASA CLASSIFICATION  2  MP   CLASSIFICATION  2    · Moderate intravenous conscious sedation was supervised by Dr. Benedict Pérez  . The patient was independently monitored by a Registered Nurse assigned to the Procedure Room  . Monitoring included automated blood pressure, continuous EKG, Capnography and continuous pulse oximetry. . The detailed Conscious Record is permanently stored in the [place] [address].      The following is the conscious sedation record;  Start Time:  1437  End times:  1452  Duration:  15 MINUTES  MEDS GIVEN 2 MG VERSED AND 50 MCG FENTANYL

## 2020-11-30 RX ORDER — POTASSIUM CHLORIDE 20 MEQ/1
TABLET, EXTENDED RELEASE ORAL
Qty: 180 TABLET | Refills: 1 | OUTPATIENT
Start: 2020-11-30

## 2020-12-07 ENCOUNTER — HOSPITAL ENCOUNTER (OUTPATIENT)
Age: 60
Setting detail: OUTPATIENT SURGERY
Discharge: HOME | End: 2020-12-07
Attending: ANESTHESIOLOGY | Admitting: ANESTHESIOLOGY
Payer: MEDICARE

## 2020-12-07 ENCOUNTER — APPOINTMENT (OUTPATIENT)
Dept: GENERAL RADIOLOGY | Age: 60
End: 2020-12-07
Attending: ANESTHESIOLOGY
Payer: MEDICARE

## 2020-12-07 VITALS
HEIGHT: 70 IN | DIASTOLIC BLOOD PRESSURE: 81 MMHG | WEIGHT: 225 LBS | TEMPERATURE: 98.4 F | BODY MASS INDEX: 32.21 KG/M2 | SYSTOLIC BLOOD PRESSURE: 131 MMHG | OXYGEN SATURATION: 98 % | RESPIRATION RATE: 18 BRPM | HEART RATE: 74 BPM

## 2020-12-07 PROCEDURE — 99152 MOD SED SAME PHYS/QHP 5/>YRS: CPT | Performed by: ANESTHESIOLOGY

## 2020-12-07 PROCEDURE — 3600000051 HC PAIN LEVEL 1 ADDL 15 MIN: Performed by: ANESTHESIOLOGY

## 2020-12-07 PROCEDURE — 3600000050 HC PAIN LEVEL 1 BASE: Performed by: ANESTHESIOLOGY

## 2020-12-07 PROCEDURE — 7100000010 HC PHASE II RECOVERY - FIRST 15 MIN: Performed by: ANESTHESIOLOGY

## 2020-12-07 PROCEDURE — 6360000002 HC RX W HCPCS: Performed by: ANESTHESIOLOGY

## 2020-12-07 PROCEDURE — 7100000011 HC PHASE II RECOVERY - ADDTL 15 MIN: Performed by: ANESTHESIOLOGY

## 2020-12-07 PROCEDURE — 64495 INJ PARAVERT F JNT L/S 3 LEV: CPT | Performed by: ANESTHESIOLOGY

## 2020-12-07 PROCEDURE — 64494 INJ PARAVERT F JNT L/S 2 LEV: CPT | Performed by: ANESTHESIOLOGY

## 2020-12-07 PROCEDURE — 6360000004 HC RX CONTRAST MEDICATION: Performed by: ANESTHESIOLOGY

## 2020-12-07 PROCEDURE — 2500000003 HC RX 250 WO HCPCS: Performed by: ANESTHESIOLOGY

## 2020-12-07 PROCEDURE — 2709999900 HC NON-CHARGEABLE SUPPLY: Performed by: ANESTHESIOLOGY

## 2020-12-07 PROCEDURE — 64493 INJ PARAVERT F JNT L/S 1 LEV: CPT | Performed by: ANESTHESIOLOGY

## 2020-12-07 PROCEDURE — 3209999900 FLUORO FOR SURGICAL PROCEDURES

## 2020-12-07 RX ORDER — LIDOCAINE HYDROCHLORIDE 10 MG/ML
INJECTION, SOLUTION INFILTRATION; PERINEURAL PRN
Status: DISCONTINUED | OUTPATIENT
Start: 2020-12-07 | End: 2020-12-07 | Stop reason: ALTCHOICE

## 2020-12-07 RX ORDER — SODIUM CHLORIDE 0.9 % (FLUSH) 0.9 %
10 SYRINGE (ML) INJECTION PRN
Status: DISCONTINUED | OUTPATIENT
Start: 2020-12-07 | End: 2020-12-07 | Stop reason: HOSPADM

## 2020-12-07 RX ORDER — FENTANYL CITRATE 50 UG/ML
INJECTION, SOLUTION INTRAMUSCULAR; INTRAVENOUS PRN
Status: DISCONTINUED | OUTPATIENT
Start: 2020-12-07 | End: 2020-12-07 | Stop reason: ALTCHOICE

## 2020-12-07 RX ORDER — BUPIVACAINE HYDROCHLORIDE 5 MG/ML
INJECTION, SOLUTION EPIDURAL; INTRACAUDAL PRN
Status: DISCONTINUED | OUTPATIENT
Start: 2020-12-07 | End: 2020-12-07 | Stop reason: ALTCHOICE

## 2020-12-07 RX ORDER — SODIUM CHLORIDE 0.9 % (FLUSH) 0.9 %
10 SYRINGE (ML) INJECTION EVERY 12 HOURS SCHEDULED
Status: DISCONTINUED | OUTPATIENT
Start: 2020-12-07 | End: 2020-12-07 | Stop reason: HOSPADM

## 2020-12-07 RX ORDER — MIDAZOLAM HYDROCHLORIDE 1 MG/ML
INJECTION INTRAMUSCULAR; INTRAVENOUS PRN
Status: DISCONTINUED | OUTPATIENT
Start: 2020-12-07 | End: 2020-12-07 | Stop reason: ALTCHOICE

## 2020-12-07 ASSESSMENT — PAIN SCALES - GENERAL
PAINLEVEL_OUTOF10: 0
PAINLEVEL_OUTOF10: 0
PAINLEVEL_OUTOF10: 3
PAINLEVEL_OUTOF10: 3
PAINLEVEL_OUTOF10: 0
PAINLEVEL_OUTOF10: 3
PAINLEVEL_OUTOF10: 0
PAINLEVEL_OUTOF10: 0

## 2020-12-07 ASSESSMENT — PAIN - FUNCTIONAL ASSESSMENT
PAIN_FUNCTIONAL_ASSESSMENT: PREVENTS OR INTERFERES SOME ACTIVE ACTIVITIES AND ADLS
PAIN_FUNCTIONAL_ASSESSMENT: 0-10

## 2020-12-07 ASSESSMENT — PAIN DESCRIPTION - DESCRIPTORS: DESCRIPTORS: CONSTANT;ACHING

## 2020-12-07 NOTE — OP NOTE
Operative Note      Patient: Troy Cuellar  YOB: 1960  MRN: [id number]    Date of Procedure: 12/7/2020    Pre-Op Diagnosis: DX LUMBAR FACET JOINT SYNDROME, LUMBAR FACET ARTHROPATHY    Post-Op Diagnosis: Same       Procedure(s):  NERVE BLOCK BILATERAL LUMBAR MEDIAL BRANCH L2-L5    Surgeon(s):  Tad Callejas MD    Assistant:   * No surgical staff found *    Anesthesia: IV Sedation    Estimated Blood Loss (mL): Minimal    Complications: None    Specimens:   * No specimens in log *    Implants:  * No implants in log *      Drains: * No LDAs found *    Findings: N/A    Detailed Description of Procedure:     Patient Name: Troy Cuellar   YOB: 1960  Room/Bed: STAZ OR Pool/NONE  Medical Record Number: [id number]  Date: 12/7/2020       Sedation/ Anesthesia Plan:   intravenous sedation   as needed. Medications Planned:   midazolam (Versed) / Fentanyl  Intravenously  as needed. Preoperative Diagnosis: Lumbar spondylosis w/o myelopathy or radiculopathy  Postoperative Diagnosis: Lumbar spondylosis w/o myelopathy or radiculopathy  Blood Loss: none    Procedure Performed:  Bilateral Lumbar Medial Branch nerve Blocks at the transverse processes of L3, L4, L5 and sacral  ala under fluoroscopy guidance    Procedure: The Patient was seen in the preop area, chart was reviewed, informed consent was obtained. Patient was taken to procedure room and was placed in prone position. Vital signs were monitored through out the  Procedure. A time out was completed. The skin over the back was prepped and draped in sterile manner. The target point was marked at the junction of Transverse process and superior articular process at the target levels. Skin and deep tissues were anesthetized with 1 % lidocaine. A 25-gauge needlele was advanced to the target spots under fluoroscopy guidance in AP / Lateral and Oblique views.      Then after negative aspiration contrast dye was injected with live fluoroscopy in AP views that showed  spread of the contrast with no epidural space and no vascular runoff or intrathecal spread. Finally 0.5 ml of treatment solution 0.5% bupivacaine was injected at each level. The needle was removed and a Band-Aid was placed over the needle  insertion site. The patient's vital signs remained stable and the patient tolerated the procedure well. Post Procedure pain score in PACU was assessed with ambulation 0/10    Electronically signed by Kody Claros MD on 12/7/2020 at 2:46 PM      SEDATION NOTE:    ASA CLASSIFICATION  2  MP   CLASSIFICATION  2    · Moderate intravenous conscious sedation was supervised by Dr. Alyssa Chase  . The patient was independently monitored by a Registered Nurse assigned to the Procedure Room  . Monitoring included automated blood pressure, continuous EKG, Capnography and continuous pulse oximetry. . The detailed Conscious Record is permanently stored in the [place] [address].      The following is the conscious sedation record;  Start Time:  1429  End times:  1447  Duration:  18 MINUTES  MEDS GIVEN 2 MG VERSED  MCG FENTANYL        Electronically signed by Kody Claros MD on 12/7/2020 at 2:45 PM

## 2020-12-07 NOTE — H&P
History and Physical Service   [facility] [employer]    HISTORY AND PHYSICAL EXAMINATION            Date of Evaluation: 12/7/2020  Patient name:  Cesar River  MRN:   [id number]  YOB: 1960  PCP:    Jed [facility] [address], APRN - CNP    History Obtained From:     Patient, medical records    History of Present Illness: This is Cesar River a 63 y.o. male who presents today for a Nerve block bilateral lumbar medial branch L2-L5 by Dr. Ariel Horowitz for Lumbar facet joint syndrome, lumbar facet arthropathy. Hx Chronic back pain across lower back greatest to left with radiation down leg. Pain 3-8/10 depending on activity level. Follows with Dr Orlando Araya for pain management with previous CACHORRO and nerve blocks to both lower back and cervical spine over the years. Last lumbar procedure 8/17/20 with improved comfort. He now presents for his scheduled injections. Denies bowel or bladder incontinence, fever, chills, cough, increased SOB or chest pain. No DM or use of anticoagulants.       Past Medical History:     Past Medical History:   Diagnosis Date    Alcohol abuse     6-12 beers a day; quit drinking July 2016    Arthritis     Back pain, chronic     dr. Emmanuel Diallo, orthopedic, every 3-4 months, gets steroid injection    BPH (benign prostatic hypertrophy)     Cholelithiasis     Cirrhosis ([facility] [address]     DDD (degenerative disc disease), lumbar     Fatty liver     GERD (gastroesophageal reflux disease)     Hep C w/o coma, chronic (HCC)     History of colon polyps 2016    [place] (hard [place]     Hyperlipidemia     Hypertension     Stomach ulcer     hx of    Tobacco abuse     Tubular adenoma of colon 2016, 2018    Wears glasses         Past Surgical History:     Past Surgical History:   Procedure Laterality Date    BUNIONECTOMY      twice on right side    BUNIONECTOMY Left     CARPAL TUNNEL RELEASE Right     COLONOSCOPY      at age 35    COLONOSCOPY  10/05/2016    polyps-pathology INTEGUMENT:  negative for rash, skin lesions, easy bruising   HEMATOLOGIC/LYMPHATIC:  negative for swelling/edema   ALLERGIC/IMMUNOLOGIC:  negative for urticaria , itching  ENDOCRINE:  negative increase in drinking, increase in urination, hot or cold intolerance  MUSCULOSKELETAL: See HPI  negative joint pains, muscle aches, swelling of joints  NEUROLOGICAL:  negative for headaches, dizziness, lightheadedness, numbness, pain, tingling extremities  BEHAVIOR/PSYCH:  negative for depression, anxiety    Physical Exam:   BP (!) 143/72   Pulse 86   Temp 96 °F (35.6 °C) (Temporal)   Resp 16   Ht 5' 10\" (1.778 m)   Wt 225 lb (102.1 kg)   SpO2 96%   BMI 32.28 kg/m²   No results for input(s): POCGLU in the last 72 hours. General Appearance:  alert, well appearing, and in no acute distress  Mental status: oriented to person, place, and time with normal affect  Head:  normocephalic, atraumatic. Eye: no icterus, redness, pupils equal and reactive, extraocular eye movements intact, conjunctiva clear  Ear: normal external ear, no discharge, hearing intact  Nose:  no drainage noted  Mouth: mucous membranes moist  Neck: supple, no carotid bruits, thyroid not palpable  Lungs: Bilateral equal air entry, mildly diminished breath sounds bibasilar, no wheezing, rales or rhonchi, normal effort  Cardiovascular: HR 86  normal rate, regular rhythm, no murmur, gallop, rub.   Abdomen: round, soft, nontender, nondistended, normal bowel sounds  Neurologic: There are no new focal motor or sensory deficits, normal muscle tone and bulk, no abnormal sensation, normal speech, cranial nerves II through XII grossly intact  Equal bilateral lower extremity strength   Skin: No gross lesions, rashes, bruising or bleeding on exposed skin area  Extremities:  peripheral pulses palpable, no pedal edema or calf pain with palpation  Psych: normal affect     Investigations:      Laboratory Testing:  No results found for this or any previous visit (from the past 24 hour(s)). No results for input(s): HGB, HCT, WBC, MCV, PLATELET, NA, K, CL, CO2, BUN, CREATININE, GLUCOSE, INR, PROTIME, APTT, AST, ALT, LABALBU, HCG in the last 720 hours. No results for input(s): COVID19 in the last 720 hours. Imaging/Diagnostics:    Fluoro For Surgical Procedures    Result Date: 11/23/2020  Radiology exam is complete. No Radiologist dictation. Please follow up with ordering provider. Diagnosis:      1. Lumbar facet joint syndrome, lumbar facet arthropathy    Plans:     1.  Nerve block bilateral lumbar medial branch L2-L5      MASSIMO Del Cid - CNP  12/7/2020  2:03 PM

## 2020-12-23 ENCOUNTER — HOSPITAL ENCOUNTER (INPATIENT)
Age: 60
LOS: 7 days | Discharge: NURSING, REHAB OR LONG-TERM CARE | DRG: 432 | End: 2020-12-30
Attending: EMERGENCY MEDICINE | Admitting: INTERNAL MEDICINE
Payer: MEDICARE

## 2020-12-23 DIAGNOSIS — F10.10 ALCOHOL ABUSE: ICD-10-CM

## 2020-12-23 DIAGNOSIS — K92.1 GASTROINTESTINAL HEMORRHAGE WITH MELENA: Primary | ICD-10-CM

## 2020-12-23 PROBLEM — K92.2 ACUTE GASTROINTESTINAL BLEEDING: Status: ACTIVE | Noted: 2020-12-23

## 2020-12-23 PROBLEM — D69.6 THROMBOCYTOPENIA (HCC): Status: ACTIVE | Noted: 2020-12-23

## 2020-12-23 PROBLEM — Z86.19 HEPATITIS C VIRUS INFECTION RESOLVED AFTER ANTIVIRAL DRUG THERAPY: Status: ACTIVE | Noted: 2020-12-23

## 2020-12-23 LAB
A1 LECTIN: NEGATIVE
ABO/RH: NORMAL
ABSOLUTE BANDS #: 0.05 K/UL (ref 0–1)
ABSOLUTE EOS #: 0 K/UL (ref 0–0.4)
ABSOLUTE IMMATURE GRANULOCYTE: ABNORMAL K/UL (ref 0–0.3)
ABSOLUTE LYMPH #: 0.31 K/UL (ref 1–4.8)
ABSOLUTE MONO #: 0.15 K/UL (ref 0.1–1.3)
AFP: 3.4 UG/L
ALBUMIN SERPL-MCNC: 2.9 G/DL (ref 3.5–5.2)
ALBUMIN/GLOBULIN RATIO: ABNORMAL (ref 1–2.5)
ALP BLD-CCNC: 201 U/L (ref 40–129)
ALT SERPL-CCNC: 28 U/L (ref 5–41)
ANION GAP SERPL CALCULATED.3IONS-SCNC: 16 MMOL/L (ref 9–17)
ANTIBODY SCREEN: NEGATIVE
ARM BAND NUMBER: NORMAL
AST SERPL-CCNC: 135 U/L
BANDS: 1 % (ref 0–10)
BASOPHILS # BLD: 0 % (ref 0–2)
BASOPHILS ABSOLUTE: 0 K/UL (ref 0–0.2)
BILIRUB SERPL-MCNC: 5.57 MG/DL (ref 0.3–1.2)
BLOOD BANK COMMENT: NORMAL
BUN BLDV-MCNC: 10 MG/DL (ref 8–23)
BUN/CREAT BLD: ABNORMAL (ref 9–20)
CALCIUM SERPL-MCNC: 8.8 MG/DL (ref 8.6–10.4)
CHLORIDE BLD-SCNC: 99 MMOL/L (ref 98–107)
CO2: 23 MMOL/L (ref 20–31)
CREAT SERPL-MCNC: 0.59 MG/DL (ref 0.7–1.2)
DIFFERENTIAL TYPE: ABNORMAL
EOSINOPHILS RELATIVE PERCENT: 0 % (ref 0–4)
ETHANOL PERCENT: <0.01 %
ETHANOL: <10 MG/DL
EXPIRATION DATE: NORMAL
FOLATE: 6.3 NG/ML
GFR AFRICAN AMERICAN: >60 ML/MIN
GFR NON-AFRICAN AMERICAN: >60 ML/MIN
GFR SERPL CREATININE-BSD FRML MDRD: ABNORMAL ML/MIN/{1.73_M2}
GFR SERPL CREATININE-BSD FRML MDRD: ABNORMAL ML/MIN/{1.73_M2}
GLUCOSE BLD-MCNC: 80 MG/DL (ref 70–99)
HAV IGM SER IA-ACNC: NONREACTIVE
HCT VFR BLD CALC: 36.5 % (ref 41–53)
HCT VFR BLD CALC: 37.4 % (ref 41–53)
HEMOGLOBIN: 12.2 G/DL (ref 13.5–17.5)
HEMOGLOBIN: 12.4 G/DL (ref 13.5–17.5)
HEPATITIS B CORE IGM ANTIBODY: NONREACTIVE
HEPATITIS B SURFACE ANTIGEN: NONREACTIVE
HEPATITIS C ANTIBODY: REACTIVE
IMMATURE GRANULOCYTES: ABNORMAL %
INR BLD: 1.5
LIPASE: 11 U/L (ref 13–60)
LYMPHOCYTES # BLD: 6 % (ref 24–44)
MAGNESIUM: 1.5 MG/DL (ref 1.6–2.6)
MCH RBC QN AUTO: 30.3 PG (ref 26–34)
MCHC RBC AUTO-ENTMCNC: 33.2 G/DL (ref 31–37)
MCV RBC AUTO: 91.2 FL (ref 80–100)
MONOCYTES # BLD: 3 % (ref 1–7)
MORPHOLOGY: NORMAL
NRBC AUTOMATED: ABNORMAL PER 100 WBC
PARTIAL THROMBOPLASTIN TIME: 37.9 SEC (ref 24–36)
PDW BLD-RTO: 15.4 % (ref 11.5–14.9)
PLATELET # BLD: 65 K/UL (ref 150–450)
PLATELET ESTIMATE: ABNORMAL
PMV BLD AUTO: 8.7 FL (ref 6–12)
POTASSIUM SERPL-SCNC: 3.6 MMOL/L (ref 3.7–5.3)
PROTHROMBIN TIME: 18.3 SEC (ref 11.8–14.6)
RBC # BLD: 4.11 M/UL (ref 4.5–5.9)
RBC # BLD: ABNORMAL 10*6/UL
SARS-COV-2, RAPID: NOT DETECTED
SARS-COV-2: NORMAL
SARS-COV-2: NORMAL
SEG NEUTROPHILS: 90 % (ref 36–66)
SEGMENTED NEUTROPHILS ABSOLUTE COUNT: 4.59 K/UL (ref 1.3–9.1)
SODIUM BLD-SCNC: 138 MMOL/L (ref 135–144)
SOURCE: NORMAL
TOTAL PROTEIN: 6.5 G/DL (ref 6.4–8.3)
TROPONIN INTERP: NORMAL
TROPONIN INTERP: NORMAL
TROPONIN T: NORMAL NG/ML
TROPONIN T: NORMAL NG/ML
TROPONIN, HIGH SENSITIVITY: 15 NG/L (ref 0–22)
TROPONIN, HIGH SENSITIVITY: 17 NG/L (ref 0–22)
VITAMIN B-12: 1321 PG/ML (ref 232–1245)
WBC # BLD: 5.1 K/UL (ref 3.5–11)
WBC # BLD: ABNORMAL 10*3/UL

## 2020-12-23 PROCEDURE — 85730 THROMBOPLASTIN TIME PARTIAL: CPT

## 2020-12-23 PROCEDURE — 86901 BLOOD TYPING SEROLOGIC RH(D): CPT

## 2020-12-23 PROCEDURE — 6360000002 HC RX W HCPCS: Performed by: NURSE PRACTITIONER

## 2020-12-23 PROCEDURE — 83735 ASSAY OF MAGNESIUM: CPT

## 2020-12-23 PROCEDURE — 86850 RBC ANTIBODY SCREEN: CPT

## 2020-12-23 PROCEDURE — 85014 HEMATOCRIT: CPT

## 2020-12-23 PROCEDURE — 85018 HEMOGLOBIN: CPT

## 2020-12-23 PROCEDURE — 82746 ASSAY OF FOLIC ACID SERUM: CPT

## 2020-12-23 PROCEDURE — 2060000000 HC ICU INTERMEDIATE R&B

## 2020-12-23 PROCEDURE — 2580000003 HC RX 258: Performed by: NURSE PRACTITIONER

## 2020-12-23 PROCEDURE — 80053 COMPREHEN METABOLIC PANEL: CPT

## 2020-12-23 PROCEDURE — 84484 ASSAY OF TROPONIN QUANT: CPT

## 2020-12-23 PROCEDURE — 93005 ELECTROCARDIOGRAM TRACING: CPT | Performed by: EMERGENCY MEDICINE

## 2020-12-23 PROCEDURE — 96365 THER/PROPH/DIAG IV INF INIT: CPT

## 2020-12-23 PROCEDURE — 6360000002 HC RX W HCPCS: Performed by: EMERGENCY MEDICINE

## 2020-12-23 PROCEDURE — 80074 ACUTE HEPATITIS PANEL: CPT

## 2020-12-23 PROCEDURE — U0002 COVID-19 LAB TEST NON-CDC: HCPCS

## 2020-12-23 PROCEDURE — 82105 ALPHA-FETOPROTEIN SERUM: CPT

## 2020-12-23 PROCEDURE — 85025 COMPLETE CBC W/AUTO DIFF WBC: CPT

## 2020-12-23 PROCEDURE — U0003 INFECTIOUS AGENT DETECTION BY NUCLEIC ACID (DNA OR RNA); SEVERE ACUTE RESPIRATORY SYNDROME CORONAVIRUS 2 (SARS-COV-2) (CORONAVIRUS DISEASE [COVID-19]), AMPLIFIED PROBE TECHNIQUE, MAKING USE OF HIGH THROUGHPUT TECHNOLOGIES AS DESCRIBED BY CMS-2020-01-R: HCPCS

## 2020-12-23 PROCEDURE — 36415 COLL VENOUS BLD VENIPUNCTURE: CPT

## 2020-12-23 PROCEDURE — 6370000000 HC RX 637 (ALT 250 FOR IP): Performed by: EMERGENCY MEDICINE

## 2020-12-23 PROCEDURE — 86900 BLOOD TYPING SEROLOGIC ABO: CPT

## 2020-12-23 PROCEDURE — G0480 DRUG TEST DEF 1-7 CLASSES: HCPCS

## 2020-12-23 PROCEDURE — 82607 VITAMIN B-12: CPT

## 2020-12-23 PROCEDURE — 99285 EMERGENCY DEPT VISIT HI MDM: CPT

## 2020-12-23 PROCEDURE — C9113 INJ PANTOPRAZOLE SODIUM, VIA: HCPCS | Performed by: NURSE PRACTITIONER

## 2020-12-23 PROCEDURE — 83690 ASSAY OF LIPASE: CPT

## 2020-12-23 PROCEDURE — 2580000003 HC RX 258: Performed by: EMERGENCY MEDICINE

## 2020-12-23 PROCEDURE — 85610 PROTHROMBIN TIME: CPT

## 2020-12-23 PROCEDURE — C9113 INJ PANTOPRAZOLE SODIUM, VIA: HCPCS | Performed by: EMERGENCY MEDICINE

## 2020-12-23 PROCEDURE — 96375 TX/PRO/DX INJ NEW DRUG ADDON: CPT

## 2020-12-23 RX ORDER — ATORVASTATIN CALCIUM 20 MG/1
20 TABLET, FILM COATED ORAL NIGHTLY
Status: DISCONTINUED | OUTPATIENT
Start: 2020-12-23 | End: 2020-12-31 | Stop reason: HOSPADM

## 2020-12-23 RX ORDER — LABETALOL HYDROCHLORIDE 5 MG/ML
20 INJECTION, SOLUTION INTRAVENOUS
Status: DISCONTINUED | OUTPATIENT
Start: 2020-12-23 | End: 2020-12-31 | Stop reason: HOSPADM

## 2020-12-23 RX ORDER — POTASSIUM CHLORIDE 7.45 MG/ML
10 INJECTION INTRAVENOUS PRN
Status: DISCONTINUED | OUTPATIENT
Start: 2020-12-23 | End: 2020-12-31 | Stop reason: HOSPADM

## 2020-12-23 RX ORDER — 0.9 % SODIUM CHLORIDE 0.9 %
1000 INTRAVENOUS SOLUTION INTRAVENOUS ONCE
Status: COMPLETED | OUTPATIENT
Start: 2020-12-23 | End: 2020-12-23

## 2020-12-23 RX ORDER — SODIUM CHLORIDE 0.9 % (FLUSH) 0.9 %
10 SYRINGE (ML) INJECTION EVERY 12 HOURS SCHEDULED
Status: DISCONTINUED | OUTPATIENT
Start: 2020-12-23 | End: 2020-12-23 | Stop reason: SDUPTHER

## 2020-12-23 RX ORDER — LORAZEPAM 2 MG/ML
1 INJECTION INTRAMUSCULAR
Status: DISCONTINUED | OUTPATIENT
Start: 2020-12-23 | End: 2020-12-28

## 2020-12-23 RX ORDER — POLYETHYLENE GLYCOL 3350 17 G/17G
17 POWDER, FOR SOLUTION ORAL DAILY PRN
Status: DISCONTINUED | OUTPATIENT
Start: 2020-12-23 | End: 2020-12-31 | Stop reason: HOSPADM

## 2020-12-23 RX ORDER — LORAZEPAM 1 MG/1
2 TABLET ORAL
Status: DISCONTINUED | OUTPATIENT
Start: 2020-12-23 | End: 2020-12-28

## 2020-12-23 RX ORDER — LORAZEPAM 2 MG/ML
2 INJECTION INTRAMUSCULAR ONCE
Status: COMPLETED | OUTPATIENT
Start: 2020-12-23 | End: 2020-12-23

## 2020-12-23 RX ORDER — SODIUM CHLORIDE 0.9 % (FLUSH) 0.9 %
10 SYRINGE (ML) INJECTION PRN
Status: DISCONTINUED | OUTPATIENT
Start: 2020-12-23 | End: 2020-12-23 | Stop reason: SDUPTHER

## 2020-12-23 RX ORDER — SODIUM CHLORIDE 0.9 % (FLUSH) 0.9 %
10 SYRINGE (ML) INJECTION EVERY 12 HOURS SCHEDULED
Status: DISCONTINUED | OUTPATIENT
Start: 2020-12-23 | End: 2020-12-31 | Stop reason: HOSPADM

## 2020-12-23 RX ORDER — POTASSIUM CHLORIDE 20 MEQ/1
40 TABLET, EXTENDED RELEASE ORAL ONCE
Status: COMPLETED | OUTPATIENT
Start: 2020-12-23 | End: 2020-12-23

## 2020-12-23 RX ORDER — LORAZEPAM 2 MG/ML
2 INJECTION INTRAMUSCULAR
Status: DISCONTINUED | OUTPATIENT
Start: 2020-12-23 | End: 2020-12-28

## 2020-12-23 RX ORDER — LORAZEPAM 2 MG/ML
4 INJECTION INTRAMUSCULAR
Status: DISCONTINUED | OUTPATIENT
Start: 2020-12-23 | End: 2020-12-28

## 2020-12-23 RX ORDER — MAGNESIUM SULFATE 1 G/100ML
1 INJECTION INTRAVENOUS
Status: DISCONTINUED | OUTPATIENT
Start: 2020-12-23 | End: 2020-12-23

## 2020-12-23 RX ORDER — ACETAMINOPHEN 325 MG/1
650 TABLET ORAL EVERY 6 HOURS PRN
Status: DISCONTINUED | OUTPATIENT
Start: 2020-12-23 | End: 2020-12-24

## 2020-12-23 RX ORDER — MAGNESIUM SULFATE 1 G/100ML
1 INJECTION INTRAVENOUS ONCE
Status: DISCONTINUED | OUTPATIENT
Start: 2020-12-23 | End: 2020-12-23

## 2020-12-23 RX ORDER — PROMETHAZINE HYDROCHLORIDE 25 MG/1
12.5 TABLET ORAL EVERY 6 HOURS PRN
Status: DISCONTINUED | OUTPATIENT
Start: 2020-12-23 | End: 2020-12-31 | Stop reason: HOSPADM

## 2020-12-23 RX ORDER — LORAZEPAM 2 MG/ML
3 INJECTION INTRAMUSCULAR
Status: DISCONTINUED | OUTPATIENT
Start: 2020-12-23 | End: 2020-12-28

## 2020-12-23 RX ORDER — SODIUM CHLORIDE 0.9 % (FLUSH) 0.9 %
10 SYRINGE (ML) INJECTION PRN
Status: DISCONTINUED | OUTPATIENT
Start: 2020-12-23 | End: 2020-12-31 | Stop reason: HOSPADM

## 2020-12-23 RX ORDER — GAUZE BANDAGE 2" X 2"
100 BANDAGE TOPICAL DAILY
Status: DISCONTINUED | OUTPATIENT
Start: 2020-12-23 | End: 2020-12-24

## 2020-12-23 RX ORDER — ACETAMINOPHEN 650 MG/1
650 SUPPOSITORY RECTAL EVERY 6 HOURS PRN
Status: DISCONTINUED | OUTPATIENT
Start: 2020-12-23 | End: 2020-12-24

## 2020-12-23 RX ORDER — POTASSIUM CHLORIDE 20 MEQ/1
40 TABLET, EXTENDED RELEASE ORAL PRN
Status: DISCONTINUED | OUTPATIENT
Start: 2020-12-23 | End: 2020-12-31 | Stop reason: HOSPADM

## 2020-12-23 RX ORDER — SODIUM CHLORIDE 0.9 % (FLUSH) 0.9 %
10 SYRINGE (ML) INJECTION PRN
Status: DISCONTINUED | OUTPATIENT
Start: 2020-12-23 | End: 2020-12-23

## 2020-12-23 RX ORDER — LORAZEPAM 1 MG/1
3 TABLET ORAL
Status: DISCONTINUED | OUTPATIENT
Start: 2020-12-23 | End: 2020-12-28

## 2020-12-23 RX ORDER — ONDANSETRON 2 MG/ML
4 INJECTION INTRAMUSCULAR; INTRAVENOUS EVERY 6 HOURS PRN
Status: DISCONTINUED | OUTPATIENT
Start: 2020-12-23 | End: 2020-12-31 | Stop reason: HOSPADM

## 2020-12-23 RX ORDER — LORAZEPAM 1 MG/1
1 TABLET ORAL
Status: DISCONTINUED | OUTPATIENT
Start: 2020-12-23 | End: 2020-12-28

## 2020-12-23 RX ORDER — ONDANSETRON 2 MG/ML
8 INJECTION INTRAMUSCULAR; INTRAVENOUS ONCE
Status: COMPLETED | OUTPATIENT
Start: 2020-12-23 | End: 2020-12-23

## 2020-12-23 RX ORDER — LORAZEPAM 1 MG/1
4 TABLET ORAL
Status: DISCONTINUED | OUTPATIENT
Start: 2020-12-23 | End: 2020-12-28

## 2020-12-23 RX ORDER — SPIRONOLACTONE 25 MG/1
50 TABLET ORAL DAILY
Status: DISCONTINUED | OUTPATIENT
Start: 2020-12-23 | End: 2020-12-31 | Stop reason: HOSPADM

## 2020-12-23 RX ORDER — FOLIC ACID 1 MG/1
1 TABLET ORAL DAILY
Status: DISCONTINUED | OUTPATIENT
Start: 2020-12-23 | End: 2020-12-24

## 2020-12-23 RX ADMIN — OCTREOTIDE ACETATE 50 MCG/HR: 1000 INJECTION, SOLUTION INTRAVENOUS; SUBCUTANEOUS at 18:02

## 2020-12-23 RX ADMIN — ONDANSETRON 8 MG: 2 INJECTION INTRAMUSCULAR; INTRAVENOUS at 12:03

## 2020-12-23 RX ADMIN — SODIUM CHLORIDE 1000 ML: 9 INJECTION, SOLUTION INTRAVENOUS at 12:03

## 2020-12-23 RX ADMIN — SODIUM CHLORIDE 8 MG/HR: 9 INJECTION, SOLUTION INTRAVENOUS at 20:54

## 2020-12-23 RX ADMIN — POTASSIUM CHLORIDE 40 MEQ: 1500 TABLET, EXTENDED RELEASE ORAL at 13:12

## 2020-12-23 RX ADMIN — SODIUM CHLORIDE 80 MG: 9 INJECTION, SOLUTION INTRAVENOUS at 12:04

## 2020-12-23 RX ADMIN — MAGNESIUM SULFATE HEPTAHYDRATE 1 G: 1 INJECTION, SOLUTION INTRAVENOUS at 13:12

## 2020-12-23 RX ADMIN — LORAZEPAM 2 MG: 2 INJECTION INTRAMUSCULAR; INTRAVENOUS at 15:05

## 2020-12-23 ASSESSMENT — ENCOUNTER SYMPTOMS
ABDOMINAL PAIN: 0
WHEEZING: 0
DIARRHEA: 0
VOMITING: 1
CONSTIPATION: 0
NAUSEA: 1
ABDOMINAL PAIN: 1
ABDOMINAL DISTENTION: 0
SHORTNESS OF BREATH: 0
COUGH: 0
CHEST TIGHTNESS: 0

## 2020-12-23 NOTE — PLAN OF CARE
Case d/w dr Mirna Dominique tomorrow covid testing ordered ppi and sandostatin drip ordered . Miroslava Orlando Cea, APRN - CNP

## 2020-12-23 NOTE — PROGRESS NOTES
RN called pharmacy to verify compatibility of Protonix and Sandostatin as IV compatibility was unclear. OK to give together. Patient

## 2020-12-23 NOTE — ED PROVIDER NOTES
[facility]    Pt Name: Hilary Taylor  MRN: [id number]  [place] 1960  Date of evaluation: 12/23/20  CHIEF COMPLAINT       Chief Complaint   Patient presents with    Hematemesis    Weight Loss     HISTORY OF PRESENT ILLNESS     Nausea & Vomiting  Severity:  Severe  Duration:  3 days  Timing:  Constant  Quality:  Bright red blood  Progression:  Worsening  Chronicity:  New  Associated symptoms: no abdominal pain    Associated symptoms comment:  Black stools  Lost 35 pounds this week  Risk factors: alcohol use            REVIEW OF SYSTEMS     Review of Systems   Gastrointestinal: Positive for nausea and vomiting. Negative for abdominal pain. All other systems reviewed and are negative.     PASTMEDICAL HISTORY     Past Medical History:   Diagnosis Date    Alcohol abuse     6-12 beers a day; quit drinking July 2016    Arthritis     Back pain, chronic     dr. Kate Morales, orthopedic, every 3-4 months, gets steroid injection    BPH (benign prostatic hypertrophy)     Cholelithiasis     Cirrhosis ([address]     DDD (degenerative disc disease), lumbar     Fatty liver     GERD (gastroesophageal reflux disease)     Hep C w/o coma, chronic ([address]     History of colon polyps 2016    [place] (hard of hearing)     Hyperlipidemia     Hypertension     Stomach ulcer     hx of    Tobacco abuse     Tubular adenoma of colon 2016, 2018    Wears glasses      Past Problem List  Patient Active Problem List   Diagnosis Code    Back pain, chronic M54.9, G89.29    Hearing difficulty H91.90    GERD (gastroesophageal reflux disease) K21.9    Cervical radicular pain M54.12    Chronic viral hepatitis B without delta agent and without coma (HCC) B18.1    Calculus of gallbladder without cholecystitis K80.20    Hep C w/o coma, chronic (HCC) B18.2    Fatty liver K76.0    Insomnia G47.00    Cirrhosis ([address] K74.60    Hepatic cirrhosis ([address] K74.60    Chronic bilateral low back pain with left-sided sciatica M54.42, G89.29    DDD (degenerative disc disease), lumbar M51.36    Depression F32.9    Grief F43.21    Tubular adenoma of colon D12.6    History of colon polyps Z86.010    Gynecomastia, male N58    Lumbar radiculitis M54.16    Lumbar disc herniation M51.26    Tinnitus H93.19    Eustachian tube dysfunction H69.80    Ganglion cyst M67.40    Carpal tunnel syndrome of right wrist G56.01    History of hepatitis C Z86.19    Vitamin D deficiency E55.9    Pure hypercholesterolemia E78.00    Hypokalemia E87.6    Essential hypertension I10    Recurrent major depressive disorder in partial remission (HCC) F33.41    S/P epidural steroid injection Z92.241    Low testosterone R79.89    Seasonal allergies J30.2    Lumbar facet joint syndrome M47.816    Cervical facet syndrome M47.812    Acute gastrointestinal bleeding K92.2     SURGICAL HISTORY       Past Surgical History:   Procedure Laterality Date    BUNIONECTOMY      twice on right side    BUNIONECTOMY Left     CARPAL TUNNEL RELEASE Right     COLONOSCOPY      at age 35    COLONOSCOPY  10/05/2016    polyps-pathology tubular adenoma, and abnormal looking mucosa right colon-pathology-tubular adenoma    COLONOSCOPY N/A 3/30/2018    COLONOSCOPY POLYPECTOMY COLD BIOPSY performed by Darren Chapa MD at [address]  03/30/2018    Small polyp in the sigmoid colon and excised with biopsy forceps--tubular adenoma    ENDOSCOPY, COLON, DIAGNOSTIC      EGD    KNEE SURGERY Left     cyst removed    NASAL SEPTUM SURGERY      NERVE BLOCK Right 11/23/2020    NERVE BLOCK RIGHT CERVICAL STEROID INJECTION  C3-C6 [facility]    steroid injection C7 T1    OTHER SURGICAL HISTORY  11/21/2016    Bilateral Lumbar CACHORRO L4-L5 injections    OTHER SURGICAL HISTORY  12/19/2016    lumbar steroid injection    OTHER SURGICAL HISTORY  09/28/2018    BILATERAL L5 CACHORRO (N/A Back)    OTHER SURGICAL HISTORY Right 2020    cervical injection    PAIN MANAGEMENT PROCEDURE Left 2020    EPIDURAL STEROID INJECTION LEFT L4 L5 performed by Joe Wu MD at [facility] Left 2020    LEFT L4 L5 EPIDURAL STEROID INJECTION performed by Joe Wu MD at [address] [place] [address] Bilateral 2020    LUMBAR FACET BILATERAL L2-L5 performed by Joe Wu MD at [address] [place] [address] Bilateral 2020    NERVE BLOCK BILATERAL LUMBAR MEDIAL BRANCH L2-L5 performed by Joe Wu MD at [place] [address] ANES/STEROID FORAMEN LUMBAR/SACRAL [address] ,1 LEVEL Bilateral 2018    BILATERAL L5 CACHORRO performed by Joe Wu MD at [place] [address] ANES/STEROID [address] [facility] ,1 LEVEL N/A 2018    BILATERAL L5 CACHORRO performed by Joe Wu MD at [address] N/CARPAL TUNNEL SURG Right 2017    CARPAL TUNNEL RELEASE RIGHT performed by Winston Fuentes MD at [address] N/CARPAL TUNNEL SURG Left 10/31/2017    CARPAL TUNNEL RELEASE performed by Winston Fuentes MD at [address] [place] [address]       Previous Medications    ANDRODERM 4 MG/24HR PT24    APPLY 1 PATCH TO SHOULDER EVERY DAY    ATORVASTATIN (LIPITOR) 20 MG TABLET    take 1 tablet by mouth at bedtime    FLUOXETINE (PROZAC) 20 MG CAPSULE    take 1 capsule by mouth once daily    HYDROXYZINE (VISTARIL) 25 MG CAPSULE    take 1 capsule by mouth three times a day if needed for anxiety    SILDENAFIL (VIAGRA) 100 MG TABLET    Take 1 tablet by mouth as needed for Erectile Dysfunction    SPIRONOLACTONE (ALDACTONE) 50 MG TABLET    take 1 tablet by mouth once daily    VITAMIN D (ERGOCALCIFEROL) 1.25 MG (68699 UT) CAPS CAPSULE    take 1 capsule by mouth every week     ALLERGIES     has No Known Allergies. FAMILY HISTORY     He indicated that his mother is . He indicated that his father is .  He indicated that both of his sisters are alive. He indicated that his brother is alive. SOCIAL HISTORY       Social History     Tobacco Use    Smoking status: Former Smoker     Packs/day: 1.00     Years: 45.00     Pack years: 45.00     Quit date: 1/17/2017     Years since quitting: 3.9    Smokeless tobacco: Never Used   Substance Use Topics    Alcohol use: No     Comment: 7/17/2016 he stopped drinking    Drug use: No     Frequency: 1.0 times per week     Comment: cocaine,  stopped spring 2016     PHYSICAL EXAM     INITIAL VITALS: /74   Pulse 94   Temp 98.1 °F (36.7 °C) (Oral)   Resp 23   Ht 5' 10\" (1.778 m)   Wt 198 lb (89.8 kg)   SpO2 98%   BMI 28.41 kg/m²    Physical Exam  Vitals signs reviewed. Constitutional:       General: He is not in acute distress. Appearance: Normal appearance. He is well-developed. He is not diaphoretic. HENT:      Head: Normocephalic and atraumatic. Right Ear: External ear normal.      Left Ear: External ear normal.      Nose: Nose normal. No congestion. Mouth/Throat:      Mouth: Mucous membranes are moist.      Pharynx: Oropharynx is clear. Eyes:      General:         Right eye: No discharge. Left eye: No discharge. Conjunctiva/sclera: Conjunctivae normal.      Pupils: Pupils are equal, round, and reactive to light. Neck:      Musculoskeletal: Normal range of motion and neck supple. Trachea: No tracheal deviation. Cardiovascular:      Rate and Rhythm: Normal rate and regular rhythm. Pulses: Normal pulses. Heart sounds: Normal heart sounds. Pulmonary:      Effort: Pulmonary effort is normal. No respiratory distress. Breath sounds: Normal breath sounds. No stridor. No wheezing or rales. Abdominal:      Palpations: Abdomen is soft. Tenderness: There is no abdominal tenderness. There is no guarding or rebound. Genitourinary:     Rectum: Guaiac result positive. Musculoskeletal: Normal range of motion. General: No tenderness or deformity. Skin:     General: Skin is warm and dry. Capillary Refill: Capillary refill takes less than 2 seconds. Findings: No erythema or rash. Neurological:      General: No focal deficit present. Mental Status: He is alert and oriented to person, place, and time. Cranial Nerves: No cranial nerve deficit. Coordination: Coordination normal.   Psychiatric:         Mood and Affect: Mood normal.         Behavior: Behavior normal.         Thought Content: Thought content normal.         Judgment: Judgment normal.         MEDICAL DECISION MAKING:       ED Course as of Dec 23 1257   Wed Dec 23, 2020   1245 DW Dr Katja James accepts admit, he will consult GI    [WM]      ED Course User Index  [WM] Victoriano Sanders MD     Iv protonix  Iv fluids    Procedures    DIAGNOSTIC RESULTS   EKG:All EKG's are interpreted by the Emergency Department Physician who either signs or Co-signs this chart in the absence of a cardiologist.  SR, nonspecific changes, qtc 515, some lat st depressions, no change compared to old ekg    LABS: All lab results were reviewed by myself, and all abnormals are listed below.   Labs Reviewed   CBC WITH AUTO DIFFERENTIAL - Abnormal; Notable for the following components:       Result Value    RBC 4.11 (*)     Hemoglobin 12.4 (*)     Hematocrit 37.4 (*)     RDW 15.4 (*)     Platelets 65 (*)     Seg Neutrophils 90 (*)     Lymphocytes 6 (*)     Absolute Lymph # 0.31 (*)     All other components within normal limits   COMPREHENSIVE METABOLIC PANEL - Abnormal; Notable for the following components:    CREATININE 0.59 (*)     Potassium 3.6 (*)     Alkaline Phosphatase 201 (*)      (*)     Total Bilirubin 5.57 (*)     Alb 2.9 (*)     All other components within normal limits   PROTIME-INR - Abnormal; Notable for the following components:    Protime 18.3 (*)     All other components within normal limits   LIPASE - Abnormal; Notable for the following components: Lipase 11 (*)     All other components within normal limits   MAGNESIUM - Abnormal; Notable for the following components:    Magnesium 1.5 (*)     All other components within normal limits   TROPONIN   ETHANOL   TROPONIN   TYPE AND SCREEN       EMERGENCY DEPARTMENTCOURSE:         Vitals:    Vitals:    12/23/20 1208 12/23/20 1209 12/23/20 1210 12/23/20 1211   BP:       Pulse: 95 94 90 94   Resp: 17 24 21 23   Temp:       TempSrc:       SpO2: 98% 97% 98% 98%   Weight:       Height:           The patient was given the following medications while in the emergency department:  Orders Placed This Encounter   Medications    0.9 % sodium chloride bolus    pantoprazole (PROTONIX) 80 mg in sodium chloride 0.9 % 50 mL bolus    ondansetron (ZOFRAN) injection 8 mg    magnesium sulfate 1 g in dextrose 5% 100 mL IVPB    potassium chloride (KLOR-CON M) extended release tablet 40 mEq     CONSULTS:  IP CONSULT TO INTERNAL MEDICINE    FINAL IMPRESSION      1. Gastrointestinal hemorrhage with melena          DISPOSITION/PLAN   DISPOSITION Admitted 12/23/2020 12:47:27 PM      PATIENT REFERRED TO:  No follow-up provider specified.   DISCHARGE MEDICATIONS:  New Prescriptions    No medications on file     Hallie Campbell MD  Attending Emergency Physician                   Hallie Campbell MD  12/23/20 2458      I think the patient is in alcohol withdrawal  Giving ativan iv     Hallie Campbell MD  12/23/20 4045

## 2020-12-23 NOTE — PROGRESS NOTES
Dr Db Harris ([facility] ) notified of consult thru perfect serve at 4:18pm

## 2020-12-23 NOTE — H&P
[facility]     HISTORY AND PHYSICAL EXAMINATION            Date:   12/23/2020  Patient name:  Diogo Gtz  Date of admission:  12/23/2020 10:57 AM  MRN:   [id number]  Account:  [de-identified]  YOB: 1960  PCP:    MASSIMO Whitfield CNP  Room:   2090/2090-01  Code Status:    Full Code    Chief Complaint:     Chief Complaint   Patient presents with    Hematemesis    Weight Loss       History Obtained From:     Patient is a poor historian, electronic medical record    History of Present Illness: The patient is a 63 y.o. Non-/non  male who presents withHematemesis and Weight Loss   and he is admitted to the hospital for the management of GI bleed. Patient has a past medical history of chronic hepatitis C treated with Harvani, hepatic cirrhosis with ascites, GERD, essential hypertension presented to the ER with complaint of nausea and vomiting for 3 days. Patient states that his vomitus contains bright red blood. Patient is also complaining of black stools. He reports that he was drinking heavily on Friday and woke up the next day with abdominal cramping and projectile vomiting. Patient says that he vomited 7 times since Sunday and has not been able to  eat or drink since his symptoms started and has lost 6 pounds in 1 week. Patient denies any vomiting today and states that he only has dry heaves. On exam, patient has fine tremors with positive finger-to-nose test with impaired coordination and unsteady gait. His last drink was on Friday, ethanol levels less than 10 in ER. Patient drinks 6-8 beers on the weekend or \"enough until he blacks out\". Patient reportedly quit drinking many years back, recently resumed drinking on weekends.  According to charts patient had IR guided paracentesis 2016 due to ascites with successful removal of 7 L of clear fluid. Patient has not had paracentesis since then. CT abdomen pelvis done in 2016 showed splenic artery aneurysm, cholelithiasis with CT evidence of cholecystitis. Patient denies surgical history of cholecystectomy. In the ER FOBT positive, hemoglobin 12.4, potassium 3.6. Liver enzymes elevated , AST 28, alkaline phosphatase 201. Albumin 2.9    Patient put on CIWA protocol, octreotide drip 50 MCG per hour, Protonix drip 8 mg/h. Liver ultrasound, AFP ordered, GI consulted, plan for EGD tomorrow.      Past Medical History:     Past Medical History:   Diagnosis Date    Alcohol abuse     6-12 beers a day; quit drinking July 2016    Arthritis     Back pain, chronic     dr. Agustina Wren, orthopedic, every 3-4 months, gets steroid injection    BPH (benign prostatic hypertrophy)     Cholelithiasis     Cirrhosis ([address]     DDD (degenerative disc disease), lumbar     Fatty liver     GERD (gastroesophageal reflux disease)     Hep C w/o coma, chronic (HCC)     History of colon polyps 2016    [place] (hard of hearing)     Hyperlipidemia     Hypertension     Stomach ulcer     hx of    Tobacco abuse     Tubular adenoma of colon 2016, 2018    Wears glasses         Past SurgicalHistory:     Past Surgical History:   Procedure Laterality Date    BUNIONECTOMY      twice on right side    BUNIONECTOMY Left     CARPAL TUNNEL RELEASE Right     COLONOSCOPY      at age 35    COLONOSCOPY  10/05/2016    polyps-pathology tubular adenoma, and abnormal looking mucosa right colon-pathology-tubular adenoma    COLONOSCOPY N/A 3/30/2018    COLONOSCOPY POLYPECTOMY COLD BIOPSY performed by Meghan Diaz MD at [address] [place] [address]  03/30/2018    Small polyp in the sigmoid colon and excised with biopsy forceps--tubular adenoma    ENDOSCOPY, COLON, DIAGNOSTIC      EGD    KNEE SURGERY Left     cyst removed    NASAL SEPTUM SURGERY      NERVE BLOCK Right 11/23/2020    NERVE BLOCK RIGHT CERVICAL STEROID INJECTION  C3-C6 performed by Kassi Ford MD at [address]  01/04/16    steroid injection C7 T1    OTHER SURGICAL HISTORY  11/21/2016    Bilateral Lumbar CACHORRO L4-L5 injections    OTHER SURGICAL HISTORY  12/19/2016    lumbar steroid injection    OTHER SURGICAL HISTORY  09/28/2018    BILATERAL L5 CACHORRO (N/A Back)    OTHER SURGICAL HISTORY Right 11/23/2020    cervical injection    PAIN MANAGEMENT PROCEDURE Left 7/9/2020    EPIDURAL STEROID INJECTION LEFT L4 L5 [facility] Left 7/20/2020    LEFT L4 L5 EPIDURAL STEROID INJECTION performed by Kassi Ford MD at [facility] Bilateral 8/17/2020    LUMBAR FACET BILATERAL L2-L5 performed by Kassi Ford MD at [facility] Bilateral 12/7/2020    NERVE BLOCK BILATERAL LUMBAR MEDIAL BRANCH L2-L5 performed by Kassi Ford MD at [place] [address] ANES/STEROID FORAMEN LUMBAR/SACRAL [address] [place] [address] Dr ,1 LEVEL Bilateral 9/6/2018    BILATERAL L5 CACHORRO performed by Kassi Ford MD at [place] [address] ANES/STEROID [address] [facility] ,1 LEVEL N/A 9/28/2018    BILATERAL L5 CACHORRO performed by Kassi Ford MD at [facility] N/CARPAL [address] Right 8/29/2017    CARPAL TUNNEL RELEASE RIGHT performed by Yarely Nunn MD at [facility] N/CARPAL TUNNEL SURG Left 10/31/2017    CARPAL TUNNEL RELEASE performed by Yarely Nunn MD at [address]         Medications Prior to Admission:        Prior to Admission medications    Medication Sig Start Date End Date Taking?  Authorizing Provider   FLUoxetine (PROZAC) 20 MG capsule take 1 capsule by mouth once daily 11/6/20  Yes Maureen Borges, APRN - CNP   ANDRODERM 4 MG/24HR PT24 APPLY 1 PATCH TO SHOULDER EVERY DAY 10/28/20  Yes Historical Provider, MD   hydrOXYzine (VISTARIL) 25 MG capsule take 1 capsule by mouth three times a day if needed for anxiety 7/28/20  Yes MASSIMO Coleman CNP   atorvastatin (LIPITOR) 20 MG tablet take 1 tablet by mouth at bedtime 7/1/20  Yes MASSIMO Coleman CNP   spironolactone (ALDACTONE) 50 MG tablet take 1 tablet by mouth once daily 6/30/20  Yes MASSIMO Coleman CNP   vitamin D (ERGOCALCIFEROL) 1.25 MG (78758 UT) CAPS capsule take 1 capsule by mouth every week 5/21/20  Yes MASSIMO Coleman CNP   sildenafil (VIAGRA) 100 MG tablet Take 1 tablet by mouth as needed for Erectile Dysfunction 5/21/20   Anne MASSIMO Alvarado CNP        Allergies:     Patient has no known allergies. Social History:     Tobacco:    reports that he quit smoking about 3 years ago. He has a 45.00 pack-year smoking history. He has never used smokeless tobacco.  Alcohol:      reports no history of alcohol use. Drug Use:  reports no history of drug use. Family History:     Family History   Problem Relation Age of Onset   [facility] Cancer Mother         pancreatic    Cancer Father         bone    Diabetes Sister     Asthma Brother        Review of Systems:     Positive and Negative as described in HPI. Review of Systems   Constitutional: Positive for appetite change and fatigue. Negative for activity change, chills and fever. HENT: Negative for congestion. Respiratory: Negative for cough, chest tightness, shortness of breath and wheezing. Cardiovascular: Negative for chest pain and leg swelling. Gastrointestinal: Positive for abdominal pain, nausea and vomiting. Negative for abdominal distention, constipation and diarrhea. Genitourinary: Negative for dysuria and flank pain. Skin: Negative for rash. Neurological: Positive for tremors. Negative for dizziness, weakness, numbness and headaches. Psychiatric/Behavioral: Negative for agitation, behavioral problems, confusion and sleep disturbance.        Physical Exam:   BP (!) 137/90   Pulse 98   Temp 97.5 °F (36.4 °C) (Oral)   Resp 20   Ht 5' 10\" (1.778 m)   Wt 198 lb (89.8 kg)   SpO2 98%   BMI 28.41 kg/m²   Temp (24hrs), Av.8 °F (36.6 °C), Min:97.5 °F (36.4 °C), Max:98.1 °F (36.7 °C)    No results for input(s): POCGLU in the last 72 hours. Intake/Output Summary (Last 24 hours) at 2020 1702  Last data filed at 2020 1234  Gross per 24 hour   Intake 1050 ml   Output --   Net 1050 ml       Physical Exam  Constitutional:       General: He is not in acute distress. Appearance: Normal appearance. He is not ill-appearing. HENT:      Head: Normocephalic. Mouth/Throat:      Mouth: Mucous membranes are moist.   Cardiovascular:      Rate and Rhythm: Normal rate and regular rhythm. Pulses: Normal pulses. Heart sounds: Normal heart sounds. No murmur. No gallop. Pulmonary:      Effort: Pulmonary effort is normal. No respiratory distress. Breath sounds: Normal breath sounds. No wheezing. Chest:      Chest wall: No tenderness. Abdominal:      General: Bowel sounds are normal. There is distension. Palpations: Abdomen is soft. There is no mass. Tenderness: There is no abdominal tenderness. Comments: Gynecomastia, dilated superficial abdominal veins   Musculoskeletal:         General: No swelling, tenderness or deformity. Neurological:      General: No focal deficit present. Mental Status: He is alert and oriented to person, place, and time. Coordination: Coordination abnormal.      Comments: Fine tremors in distal extremities  Impaired finger-to-nose   Psychiatric:         Mood and Affect: Mood normal.         Behavior: Behavior normal.         Thought Content:  Thought content normal.         Investigations:     Laboratory Testing:  Recent Results (from the past 24 hour(s))   EKG 12 Lead    Collection Time: 20 11:27 AM   Result Value Ref Range    Ventricular Rate 96 BPM    Atrial Rate 96 BPM    P-R Interval 148 ms    QRS Duration 82 ms    Q-T Interval 408 ms    QTc Calculation (Bazett) 515 ms    P Axis 15 degrees    R Axis 54 degrees    T Axis 18 degrees   CBC Auto Differential    Collection Time: 12/23/20 12:00 PM   Result Value Ref Range    WBC 5.1 3.5 - 11.0 k/uL    RBC 4.11 (L) 4.5 - 5.9 m/uL    Hemoglobin 12.4 (L) 13.5 - 17.5 g/dL    Hematocrit 37.4 (L) 41 - 53 %    MCV 91.2 80 - 100 fL    MCH 30.3 26 - 34 pg    MCHC 33.2 31 - 37 g/dL    RDW 15.4 (H) 11.5 - 14.9 %    Platelets 65 (L) 777 - 450 k/uL    MPV 8.7 6.0 - 12.0 fL    NRBC Automated NOT REPORTED per 100 WBC    Differential Type NOT REPORTED     Immature Granulocytes NOT REPORTED 0 %    Absolute Immature Granulocyte NOT REPORTED 0.00 - 0.30 k/uL    WBC Morphology NOT REPORTED     RBC Morphology NOT REPORTED     Platelet Estimate NOT REPORTED     Seg Neutrophils 90 (H) 36 - 66 %    Lymphocytes 6 (L) 24 - 44 %    Monocytes 3 1 - 7 %    Eosinophils % 0 0 - 4 %    Basophils 0 0 - 2 %    Bands 1 0 - 10 %    Segs Absolute 4.59 1.3 - 9.1 k/uL    Absolute Lymph # 0.31 (L) 1.0 - 4.8 k/uL    Absolute Mono # 0.15 0.1 - 1.3 k/uL    Absolute Eos # 0.00 0.0 - 0.4 k/uL    Basophils Absolute 0.00 0.0 - 0.2 k/uL    Absolute Bands # 0.05 0.0 - 1.0 k/uL    Morphology Normal    Comprehensive Metabolic Panel    Collection Time: 12/23/20 12:00 PM   Result Value Ref Range    Glucose 80 70 - 99 mg/dL    BUN 10 8 - 23 mg/dL    CREATININE 0.59 (L) 0.70 - 1.20 mg/dL    Bun/Cre Ratio NOT REPORTED 9 - 20    Calcium 8.8 8.6 - 10.4 mg/dL    Sodium 138 135 - 144 mmol/L    Potassium 3.6 (L) 3.7 - 5.3 mmol/L    Chloride 99 98 - 107 mmol/L    CO2 23 20 - 31 mmol/L    Anion Gap 16 9 - 17 mmol/L    Alkaline Phosphatase 201 (H) 40 - 129 U/L    ALT 28 5 - 41 U/L     (H) <40 U/L    Total Bilirubin 5.57 (H) 0.3 - 1.2 mg/dL    Total Protein 6.5 6.4 - 8.3 g/dL    Alb 2.9 (L) 3.5 - 5.2 g/dL    Albumin/Globulin Ratio NOT REPORTED 1.0 - 2.5    GFR Non-African American >60 >60 mL/min    GFR African American >60 >60 mL/min    GFR Comment          GFR Staging NOT REPORTED Protime-INR    Collection Time: 12/23/20 12:00 PM   Result Value Ref Range    Protime 18.3 (H) 11.8 - 14.6 sec    INR 1.5    Lipase    Collection Time: 12/23/20 12:00 PM   Result Value Ref Range    Lipase 11 (L) 13 - 60 U/L   TYPE AND SCREEN    Collection Time: 12/23/20 12:00 PM   Result Value Ref Range    Expiration Date 12/26/2020,2359     Arm Band Number [id number]     ABO/Rh AB POSITIVE     Antibody Screen NEGATIVE     Blood Bank Comment Patient ABO/Rh confirmed as:  AB Positive [444]     A1 Lectin NEGATIVE    Troponin    Collection Time: 12/23/20 12:00 PM   Result Value Ref Range    Troponin, High Sensitivity 17 0 - 22 ng/L    Troponin T NOT REPORTED <0.03 ng/mL    Troponin Interp NOT REPORTED    Ethanol    Collection Time: 12/23/20 12:00 PM   Result Value Ref Range    Ethanol <10 <10 mg/dL    Ethanol percent <0.010 %   Magnesium    Collection Time: 12/23/20 12:00 PM   Result Value Ref Range    Magnesium 1.5 (L) 1.6 - 2.6 mg/dL   Troponin    Collection Time: 12/23/20  3:05 PM   Result Value Ref Range    Troponin, High Sensitivity 15 0 - 22 ng/L    Troponin T NOT REPORTED <0.03 ng/mL    Troponin Interp NOT REPORTED        Imaging/Diagnostics:  Fluoro For Surgical Procedures    Result Date: 12/7/2020  Radiology exam is complete. No Radiologist dictation. Please follow up with ordering provider.        Assessment :      Primary Problem  Acute gastrointestinal bleeding    Active Hospital Problems    Diagnosis Date Noted    Acute gastrointestinal bleeding [K92.2] 12/23/2020    Thrombocytopenia ([facility] [D69.6] 12/23/2020    Essential hypertension [I10] 04/24/2019    Cirrhosis (HCC) [K74.60]     Hep C w/o coma, chronic ([facility] [B18.2]        Plan:     Patient status Admit as inpatient in the  Progressive Unit/Step down    Acute GI bleed likely secondary to esophageal varices  -Hemoglobin 12.4, hematocrit 37.4  -Platelets 65  -Monitor platelets and H&H daily  -Patient has history of liver cirrhosis  -GI consulted for EGD, Covid test pending  -Octreotide drip 50 MCG per hour, pantoprazole drip 80 mg/h  -INR 1.5, PT 18.3, PTT 37.9    Liver cirrhosis 2/2 hepatitis C  -, ALT 28, alk phos 201, bilirubin 5.57 on 12/23/2020  -AST 41, ALT 17, alk phos 76, bilirubin 1.33 on 3/18 2020  -History of hep C, positive hepatitis C antibody on 7/21/2016  -Ultrasound on February 2018 significant for findings of cirrhosis, no focal liver lesion identified  -Ultrasound liver ordered  -Hepatic function panel, hepatitis panel ordered  -AFP pending   -Aldactone 50 mg p.o. daily    Alcohol abuse  -Thiamine 100 mg p.o. daily   -Folic acid 1 mg p.o. daily  -Folic acid and Z94 levels ordered  -Fall precautions  -Seizure precautions  -CIWA scale    Hypokalemia  -Potassium 3.6  -Potassium placement protocol    Hypomagnesemia  -Magnesium 1.5  -Magnesium 1 g IV given in ER    Diet: Clear liquid diet   GI prophylaxis: Pantoprazole drip 80 mg/h  DVT prophylaxis: EPC cuff, no Lovenox due to bleeding and thrombocytopenia  Consultations:   IP CONSULT TO INTERNAL MEDICINE  IP CONSULT TO GI  IP CONSULT TO SOCIAL WORK    Patient is admitted as inpatient status because of co-morbiditieslisted above, severity of signs and symptoms as outlined, requirement for current medical therapies and most importantly because of direct risk to patient if care not provided in a hospital setting.     Galina Eric MD  12/23/2020  5:02 PM    Copy sent to Dr. Marvin Umana, APRN - CNP

## 2020-12-23 NOTE — ED NOTES
Mode of arrival (squad #, walk in, police, etc) : Walk-in    Chief complaint(s): Hematemesis    Arrival Note (brief scenario, treatment PTA, etc). : Pt presents to ED c/o hematemesis for approx 3 days. Pt denies any other symptoms. Pt is A&O x4, PWD, eupneic, and no distress noted. Pt placed on cardiac monitor. Call light in reach. C= \"Have you ever felt that you should Cut down on your drinking? \"  No  A= \"Have people Annoyed you by criticizing your drinking? \"  No  G= \"Have you ever felt bad or Guilty about your drinking? \"  No  E= \"Have you ever had a drink as an Eye-opener first thing in the morning to steady your nerves or to help a hangover? \"  No      Deferred []      Reason for deferring: N/A    *If yes to two or more: probable alcohol abuse. Carlee Ramos RN  12/23/20 3350

## 2020-12-23 NOTE — CARE COORDINATION
[facility]  DVT Prophylaxis and Vaccine Status  Work List  Mandatory for all patients      Patient must be on both Chemical prophylaxis and Mechanical prophylaxis.  If chemical/mechanical prophylaxis is not ordered, the physician must document a reason for not using prophylaxis     Chemical Prophylaxis  Is patient on chemical prophylaxis: No  If no chemical prophylaxis Is a order in for No Chemical VTE prophylaxisYes  If no was the physician notified not applicable      Mechanical Prophylaxis  Is patient on mechanical prophylaxis, intermittent pneumatic compression device: Yes  If no was the physician notified not applicable        Pneumonia Vaccine  Vaccine indicated:  Not indicated  If indicated was the vaccine given: not applicable    Influenza Vaccine (applicable from October through March):  Vaccine indicated: Vaccination indicated  If indicated was the vaccine given: no- Prior to discharge    Patient Education  Education completed on DVT prophylaxis: yes

## 2020-12-23 NOTE — ED NOTES
Report given to JERI Pineda from U. Report method by phone   The following was reviewed with receiving RN:   Current vital signs:  /80   Pulse 113   Temp 98.1 °F (36.7 °C) (Oral)   Resp 25   Ht 5' 10\" (1.778 m)   Wt 198 lb (89.8 kg)   SpO2 96%   BMI 28.41 kg/m²                MEWS Score: 2     Any medication or safety alerts were reviewed. Any pending diagnostics and notifications were also reviewed, as well as any safety concerns or issues, abnormal labs, abnormal imaging, and abnormal assessment findings. Questions were answered.       Steven Plummer RN  12/23/20 6959

## 2020-12-23 NOTE — PROGRESS NOTES
RN spoke with patients emergency contact Imani Delphine [phone]. Nilam reports to RN that patient is not usually shaky, she does report that patient use to be an alcoholic however last time he was admitted he did stop and slowly restarted. Nilam reported that patient does drink again however she does not believe it is much.

## 2020-12-24 ENCOUNTER — APPOINTMENT (OUTPATIENT)
Dept: GENERAL RADIOLOGY | Age: 60
DRG: 432 | End: 2020-12-24
Payer: MEDICARE

## 2020-12-24 ENCOUNTER — APPOINTMENT (OUTPATIENT)
Dept: ULTRASOUND IMAGING | Age: 60
DRG: 432 | End: 2020-12-24
Payer: MEDICARE

## 2020-12-24 LAB
-: ABNORMAL
ABSOLUTE EOS #: 0 K/UL (ref 0–0.4)
ABSOLUTE IMMATURE GRANULOCYTE: ABNORMAL K/UL (ref 0–0.3)
ABSOLUTE LYMPH #: 0.6 K/UL (ref 1–4.8)
ABSOLUTE MONO #: 0.8 K/UL (ref 0.1–1.3)
ALBUMIN SERPL-MCNC: 2.6 G/DL (ref 3.5–5.2)
ALBUMIN/GLOBULIN RATIO: ABNORMAL (ref 1–2.5)
ALP BLD-CCNC: 163 U/L (ref 40–129)
ALT SERPL-CCNC: 25 U/L (ref 5–41)
AMMONIA: 40 UMOL/L (ref 16–60)
AMORPHOUS: ABNORMAL
AMPHETAMINE SCREEN URINE: NEGATIVE
ANION GAP SERPL CALCULATED.3IONS-SCNC: 17 MMOL/L (ref 9–17)
AST SERPL-CCNC: 112 U/L
BACTERIA: ABNORMAL
BARBITURATE SCREEN URINE: NEGATIVE
BASOPHILS # BLD: 0 % (ref 0–2)
BASOPHILS ABSOLUTE: 0 K/UL (ref 0–0.2)
BENZODIAZEPINE SCREEN, URINE: NEGATIVE
BILIRUB SERPL-MCNC: 4.69 MG/DL (ref 0.3–1.2)
BILIRUBIN DIRECT: 2.88 MG/DL
BILIRUBIN URINE: ABNORMAL
BILIRUBIN, INDIRECT: 1.81 MG/DL (ref 0–1)
BUN BLDV-MCNC: 12 MG/DL (ref 8–23)
BUN/CREAT BLD: ABNORMAL (ref 9–20)
BUPRENORPHINE URINE: NORMAL
CALCIUM SERPL-MCNC: 8.1 MG/DL (ref 8.6–10.4)
CANNABINOID SCREEN URINE: NEGATIVE
CASTS UA: ABNORMAL /LPF
CHLORIDE BLD-SCNC: 102 MMOL/L (ref 98–107)
CO2: 20 MMOL/L (ref 20–31)
COCAINE METABOLITE, URINE: NEGATIVE
COLOR: ABNORMAL
COMMENT UA: ABNORMAL
CREAT SERPL-MCNC: 0.52 MG/DL (ref 0.7–1.2)
CRYSTALS, UA: ABNORMAL /HPF
DIFFERENTIAL TYPE: ABNORMAL
EKG ATRIAL RATE: 96 BPM
EKG P AXIS: 15 DEGREES
EKG P-R INTERVAL: 148 MS
EKG Q-T INTERVAL: 408 MS
EKG QRS DURATION: 82 MS
EKG QTC CALCULATION (BAZETT): 515 MS
EKG R AXIS: 54 DEGREES
EKG T AXIS: 18 DEGREES
EKG VENTRICULAR RATE: 96 BPM
EOSINOPHILS RELATIVE PERCENT: 0 % (ref 0–4)
EPITHELIAL CELLS UA: ABNORMAL /HPF
FERRITIN: 187 UG/L (ref 30–400)
GFR AFRICAN AMERICAN: >60 ML/MIN
GFR NON-AFRICAN AMERICAN: >60 ML/MIN
GFR SERPL CREATININE-BSD FRML MDRD: ABNORMAL ML/MIN/{1.73_M2}
GFR SERPL CREATININE-BSD FRML MDRD: ABNORMAL ML/MIN/{1.73_M2}
GLOBULIN: ABNORMAL G/DL (ref 1.5–3.8)
GLUCOSE BLD-MCNC: 98 MG/DL (ref 70–99)
GLUCOSE URINE: NEGATIVE
HCT VFR BLD CALC: 33.3 % (ref 41–53)
HCT VFR BLD CALC: 33.8 % (ref 41–53)
HEMOGLOBIN: 10.7 G/DL (ref 13.5–17.5)
HEMOGLOBIN: 11.1 G/DL (ref 13.5–17.5)
IMMATURE GRANULOCYTES: ABNORMAL %
KETONES, URINE: ABNORMAL
LEUKOCYTE ESTERASE, URINE: ABNORMAL
LYMPHOCYTES # BLD: 11 % (ref 24–44)
MCH RBC QN AUTO: 30.1 PG (ref 26–34)
MCHC RBC AUTO-ENTMCNC: 32.8 G/DL (ref 31–37)
MCV RBC AUTO: 91.9 FL (ref 80–100)
MDMA URINE: NORMAL
METHADONE SCREEN, URINE: NEGATIVE
METHAMPHETAMINE, URINE: NORMAL
MONOCYTES # BLD: 14 % (ref 1–7)
MUCUS: ABNORMAL
NITRITE, URINE: POSITIVE
NRBC AUTOMATED: ABNORMAL PER 100 WBC
OPIATES, URINE: NEGATIVE
OTHER OBSERVATIONS UA: ABNORMAL
OXYCODONE SCREEN URINE: NEGATIVE
PDW BLD-RTO: 15.2 % (ref 11.5–14.9)
PH UA: 6 (ref 5–8)
PHENCYCLIDINE, URINE: NEGATIVE
PLATELET # BLD: 73 K/UL (ref 150–450)
PLATELET ESTIMATE: ABNORMAL
PMV BLD AUTO: 8.9 FL (ref 6–12)
POTASSIUM SERPL-SCNC: 3.7 MMOL/L (ref 3.7–5.3)
PROPOXYPHENE, URINE: NORMAL
PROTEIN UA: NEGATIVE
RBC # BLD: 3.68 M/UL (ref 4.5–5.9)
RBC # BLD: ABNORMAL 10*6/UL
RBC UA: ABNORMAL /HPF
RENAL EPITHELIAL, UA: ABNORMAL /HPF
SEG NEUTROPHILS: 75 % (ref 36–66)
SEGMENTED NEUTROPHILS ABSOLUTE COUNT: 4.1 K/UL (ref 1.3–9.1)
SODIUM BLD-SCNC: 139 MMOL/L (ref 135–144)
SPECIFIC GRAVITY UA: 1.02 (ref 1–1.03)
TEST INFORMATION: NORMAL
TOTAL PROTEIN: 6 G/DL (ref 6.4–8.3)
TRICHOMONAS: ABNORMAL
TRICYCLIC ANTIDEPRESSANTS, UR: NORMAL
TURBIDITY: CLEAR
URINE HGB: ABNORMAL
UROBILINOGEN, URINE: ABNORMAL
WBC # BLD: 5.5 K/UL (ref 3.5–11)
WBC # BLD: ABNORMAL 10*3/UL
WBC UA: ABNORMAL /HPF
YEAST: ABNORMAL

## 2020-12-24 PROCEDURE — 76705 ECHO EXAM OF ABDOMEN: CPT

## 2020-12-24 PROCEDURE — 2060000000 HC ICU INTERMEDIATE R&B

## 2020-12-24 PROCEDURE — 36415 COLL VENOUS BLD VENIPUNCTURE: CPT

## 2020-12-24 PROCEDURE — 2580000003 HC RX 258: Performed by: STUDENT IN AN ORGANIZED HEALTH CARE EDUCATION/TRAINING PROGRAM

## 2020-12-24 PROCEDURE — 2580000003 HC RX 258: Performed by: NURSE PRACTITIONER

## 2020-12-24 PROCEDURE — APPNB30 APP NON BILLABLE TIME 0-30 MINS: Performed by: NURSE PRACTITIONER

## 2020-12-24 PROCEDURE — 80307 DRUG TEST PRSMV CHEM ANLYZR: CPT

## 2020-12-24 PROCEDURE — 6360000002 HC RX W HCPCS: Performed by: NURSE PRACTITIONER

## 2020-12-24 PROCEDURE — 85014 HEMATOCRIT: CPT

## 2020-12-24 PROCEDURE — 2500000003 HC RX 250 WO HCPCS: Performed by: STUDENT IN AN ORGANIZED HEALTH CARE EDUCATION/TRAINING PROGRAM

## 2020-12-24 PROCEDURE — 6360000002 HC RX W HCPCS: Performed by: STUDENT IN AN ORGANIZED HEALTH CARE EDUCATION/TRAINING PROGRAM

## 2020-12-24 PROCEDURE — 85025 COMPLETE CBC W/AUTO DIFF WBC: CPT

## 2020-12-24 PROCEDURE — 80076 HEPATIC FUNCTION PANEL: CPT

## 2020-12-24 PROCEDURE — 81001 URINALYSIS AUTO W/SCOPE: CPT

## 2020-12-24 PROCEDURE — 85018 HEMOGLOBIN: CPT

## 2020-12-24 PROCEDURE — C9113 INJ PANTOPRAZOLE SODIUM, VIA: HCPCS | Performed by: NURSE PRACTITIONER

## 2020-12-24 PROCEDURE — 51701 INSERT BLADDER CATHETER: CPT

## 2020-12-24 PROCEDURE — 99222 1ST HOSP IP/OBS MODERATE 55: CPT | Performed by: INTERNAL MEDICINE

## 2020-12-24 PROCEDURE — 71045 X-RAY EXAM CHEST 1 VIEW: CPT

## 2020-12-24 PROCEDURE — 80048 BASIC METABOLIC PNL TOTAL CA: CPT

## 2020-12-24 PROCEDURE — 99223 1ST HOSP IP/OBS HIGH 75: CPT | Performed by: INTERNAL MEDICINE

## 2020-12-24 PROCEDURE — 87040 BLOOD CULTURE FOR BACTERIA: CPT

## 2020-12-24 PROCEDURE — 82728 ASSAY OF FERRITIN: CPT

## 2020-12-24 PROCEDURE — 93010 ELECTROCARDIOGRAM REPORT: CPT | Performed by: INTERNAL MEDICINE

## 2020-12-24 PROCEDURE — 82140 ASSAY OF AMMONIA: CPT

## 2020-12-24 RX ORDER — GAUZE BANDAGE 2" X 2"
500 BANDAGE TOPICAL 3 TIMES DAILY
Status: CANCELLED | OUTPATIENT
Start: 2020-12-24 | End: 2020-12-24

## 2020-12-24 RX ORDER — THIAMINE HYDROCHLORIDE 100 MG/ML
500 INJECTION, SOLUTION INTRAMUSCULAR; INTRAVENOUS 3 TIMES DAILY
Status: DISCONTINUED | OUTPATIENT
Start: 2020-12-24 | End: 2020-12-24

## 2020-12-24 RX ORDER — LORAZEPAM 2 MG/ML
4 INJECTION INTRAMUSCULAR ONCE
Status: COMPLETED | OUTPATIENT
Start: 2020-12-24 | End: 2020-12-24

## 2020-12-24 RX ORDER — CYANOCOBALAMIN 1000 UG/ML
1000 INJECTION INTRAMUSCULAR; SUBCUTANEOUS ONCE
Status: COMPLETED | OUTPATIENT
Start: 2020-12-24 | End: 2020-12-24

## 2020-12-24 RX ORDER — DEXMEDETOMIDINE HYDROCHLORIDE 4 UG/ML
0.2 INJECTION, SOLUTION INTRAVENOUS CONTINUOUS
Status: DISCONTINUED | OUTPATIENT
Start: 2020-12-24 | End: 2020-12-29

## 2020-12-24 RX ORDER — THIAMINE HYDROCHLORIDE 100 MG/ML
100 INJECTION, SOLUTION INTRAMUSCULAR; INTRAVENOUS DAILY
Status: DISCONTINUED | OUTPATIENT
Start: 2020-12-24 | End: 2020-12-24

## 2020-12-24 RX ADMIN — CEFTRIAXONE SODIUM 1 G: 1 INJECTION, POWDER, FOR SOLUTION INTRAMUSCULAR; INTRAVENOUS at 16:47

## 2020-12-24 RX ADMIN — THIAMINE HYDROCHLORIDE 100 MG: 100 INJECTION, SOLUTION INTRAMUSCULAR; INTRAVENOUS at 09:36

## 2020-12-24 RX ADMIN — CYANOCOBALAMIN 1000 MCG: 1000 INJECTION, SOLUTION INTRAMUSCULAR at 09:35

## 2020-12-24 RX ADMIN — OCTREOTIDE ACETATE 50 MCG/HR: 1000 INJECTION, SOLUTION INTRAVENOUS; SUBCUTANEOUS at 09:45

## 2020-12-24 RX ADMIN — SODIUM CHLORIDE 8 MG/HR: 9 INJECTION, SOLUTION INTRAVENOUS at 21:01

## 2020-12-24 RX ADMIN — LORAZEPAM 4 MG: 2 INJECTION INTRAMUSCULAR; INTRAVENOUS at 11:48

## 2020-12-24 RX ADMIN — OCTREOTIDE ACETATE 50 MCG/HR: 1000 INJECTION, SOLUTION INTRAVENOUS; SUBCUTANEOUS at 20:30

## 2020-12-24 RX ADMIN — THIAMINE HYDROCHLORIDE: 100 INJECTION, SOLUTION INTRAMUSCULAR; INTRAVENOUS at 17:31

## 2020-12-24 RX ADMIN — DEXMEDETOMIDINE HYDROCHLORIDE 0.4 MCG/KG/HR: 400 INJECTION INTRAVENOUS at 13:08

## 2020-12-24 RX ADMIN — LORAZEPAM 4 MG: 2 INJECTION INTRAMUSCULAR; INTRAVENOUS at 02:48

## 2020-12-24 RX ADMIN — LORAZEPAM 3 MG: 2 INJECTION INTRAMUSCULAR; INTRAVENOUS at 00:38

## 2020-12-24 RX ADMIN — LORAZEPAM 4 MG: 2 INJECTION INTRAMUSCULAR; INTRAVENOUS at 06:16

## 2020-12-24 RX ADMIN — LORAZEPAM 4 MG: 2 INJECTION INTRAMUSCULAR; INTRAVENOUS at 04:15

## 2020-12-24 ASSESSMENT — PAIN SCALES - WONG BAKER: WONGBAKER_NUMERICALRESPONSE: 0

## 2020-12-24 NOTE — PLAN OF CARE
Problem: Falls - Risk of:  Goal: Will remain free from falls  Description: Will remain free from falls  Outcome: Ongoing  Note: Pt assessed as a fall risk this shift. Remains free from falls and accidental injury at this time. Fall precautions in place, including falling star sign and fall risk band on pt. Floor free from obstacles, and bed is locked and in lowest position. Adequate lighting provided. Pt encouraged to call before getting OOB for any need. Bed alarm activated. Will continue to monitor needs during hourly rounding, and reinforce education on use of call light. Problem: Falls - Risk of:  Goal: Absence of physical injury  Description: Absence of physical injury  Outcome: Ongoing  Note: Pt remains free from physical injury this RNs shift. Will monitor      Problem: Restraint Use - Nonviolent/Non-Self-Destructive Behavior:  Goal: Absence of restraint indications  Description: Absence of restraint indications  Outcome: Ongoing  Note: Pt still remains agitated and restless. One on one in room      Problem: Restraint Use - Nonviolent/Non-Self-Destructive Behavior:  Goal: Absence of restraint-related injury  Description: Absence of restraint-related injury  Outcome: Ongoing  Note: Pt remains free from restraint-related injury.  Will closely monitor

## 2020-12-24 NOTE — FLOWSHEET NOTE
12/24/20 1211   Encounter Summary   Services provided to: Patient not available   Referral/Consult From: Ralph

## 2020-12-24 NOTE — PLAN OF CARE
hgb this am 11.1 no evidence of bleeding overnight, d/w dr Christy Hall cancelled for today, pt is likely going thru DT's, primary RN notified. Would keep pt npo right now  due to risk of aspiration  . Cahnell Scales, APRN - CNP

## 2020-12-24 NOTE — PROGRESS NOTES
Patient resting comfortably in bed, RN in to administer enema and patient started becoming aggressive. Patient kicked multiple staff members and enema was unsuccessful. Paula Balderas NP notified via perfect serve.

## 2020-12-24 NOTE — PROGRESS NOTES
Notified residents patient precedex gtt at 0.2 mcg/kg/hr. BP ranging between 61-25 systolic. Since gtt start patient sleeping, arouses to verbal stimuli at times. Instructed to stop gtt at this time and monitor patients response.

## 2020-12-24 NOTE — CARE COORDINATION
CASE MANAGEMENT NOTE:    Admission Date:  12/23/2020 Kaykay Figueroa is a 63 y.o.  male    Admitted for : Acute gastrointestinal bleeding [K92.2]    Met with:  Chart Notes    PCP:  Charity Jeffery NP                                Insurance:  Medicare      Current Residence/ Living Arrangements:  Patient is from home             Current Services PTA:  NA    Is patient agreeable to VNS: NA    Freedom of choice provided:  NA    List of [address] provided: NA    VNS chosen:  NA    DME:  none    Home Oxygen: NA    Nebulizer: NA    CPAP/BIPAP: NA    Supplier: N/A    Potential Assistance Needed: Yes    SNF needed: Yes - Has been to [address] in the past after going through DT's    Freedom of choice and list provided: NA    Pharmacy:  [facility]       Does Patient want to use MEDS to BEDS? N/A    Is patient currently receiving oral anticoagulation therapy? No    Is the Patient an NIHARIKA STEELE [facility] [place] with Readmission Risk Score greater than 14%? No  If yes, pt needs a follow up appointment made within 7 days. Family Members/Caregivers that pt would like involved in their care:    N/A    If yes, list name here:  N/A    Transportation Provider:  Patient             Is patient in Isolation/One on One/Altered Mental Status? Yes  If yes, skip next question. If no, would they like an I-Pad to  use? NA  If yes, call [phone]. Discharge Plan:  12/24: MEDICARE - Patient confused and in restraints - From home. Has been to [address] in the past after going through DT's on a previous admission. Started on Precedex gtt. Lactulose enemas. May need EGD if having active bleeding.  CIWA, bailee gtt and protonix gtt. //CANDY                Electronically signed by: Colette Noble RN on 12/24/2020 at 2:41 PM

## 2020-12-24 NOTE — PROGRESS NOTES
[address]    OCCUPATIONAL THERAPY MISSED TREATMENT NOTE   INPATIENT   Date: 20  Patient Name: Nick Sánchez       Room: 1586/1220-83  MRN: [id number]   Account #: [de-identified]    : 1960  (61 y.o.)  Gender: male     REASON FOR MISSED TREATMENT:  Hold treatment per nursing request   -   Pt in B wrist restraints 2* AMS/aggressive behavior overnight. RN requested therapy assessment be deferred at this time.  [address] [place] [address]

## 2020-12-24 NOTE — CONSULTS
INITIAL CONSULTATION     HISTORY OF PRESENT ILLNESS: Jo Ma is a 63 y.o. male with a past history remarkable for alcoholic cirrhosis, admitted to the hospital on 12/23/2020 for hematemesis. Reports hematemesis and melena for the past 3 days. Diffuse abdominal discomfort with that. She has been heavily drinking recently. He has known alcoholic cirrhosis. Elevated LFTs. Elevated bilirubin. Mild drop in H&H. He has been going through delirium tremens with agitation and confusion. Most recent colonoscopy in 2018 with removal of polyps. No reports of prior EGD. He is ex-smoker. Alcohol abuse. He denies any illicit drug use. No family history of GI or liver pathology.      PAST MEDICAL HISTORY:     Past Medical History:   Diagnosis Date    Alcohol abuse     6-12 beers a day; quit drinking July 2016    Arthritis     Back pain, chronic     dr. Dov Valles, orthopedic, every 3-4 months, gets steroid injection    BPH (benign prostatic hypertrophy)     Cholelithiasis     Cirrhosis ([address]     DDD (degenerative disc disease), lumbar     Fatty liver     GERD (gastroesophageal reflux disease)     Hep C w/o coma, chronic (HCC)     History of colon polyps 2016    [place] (hard of hearing)     Hyperlipidemia     Hypertension     Stomach ulcer     hx of    Tobacco abuse     Tubular adenoma of colon 2016, 2018    Wears glasses         Past Surgical History:   Procedure Laterality Date    BUNIONECTOMY      twice on right side    BUNIONECTOMY Left     CARPAL TUNNEL RELEASE Right     COLONOSCOPY      at age 35    COLONOSCOPY  10/05/2016    polyps-pathology tubular adenoma, and abnormal looking mucosa right colon-pathology-tubular adenoma    COLONOSCOPY N/A 3/30/2018    COLONOSCOPY POLYPECTOMY COLD BIOPSY performed by Laci Dudley MD at [address] [place] [address]  03/30/2018    Small polyp in the sigmoid colon and excised with biopsy forceps--tubular adenoma    ENDOSCOPY, COLON, DIAGNOSTIC      EGD [Held by provider] atorvastatin (LIPITOR) tablet 20 mg, 20 mg, Oral, Nightly, Jose Severino MD    spironolactone (ALDACTONE) tablet 50 mg, 50 mg, Oral, Daily, Jose Severino MD    sodium chloride flush 0.9 % injection 10 mL, 10 mL, Intravenous, 2 times per day, Jose Severino MD    sodium chloride flush 0.9 % injection 10 mL, 10 mL, Intravenous, PRN, Jsoe Severino MD    promethazine (PHENERGAN) tablet 12.5 mg, 12.5 mg, Oral, Q6H PRN **OR** ondansetron (ZOFRAN) injection 4 mg, 4 mg, Intravenous, Q6H PRN, Jose Severino MD    polyethylene glycol (GLYCOLAX) packet 17 g, 17 g, Oral, Daily PRN, Jose Severino MD    acetaminophen (TYLENOL) tablet 650 mg, 650 mg, Oral, Q6H PRN **OR** acetaminophen (TYLENOL) suppository 650 mg, 650 mg, Rectal, Q6H PRN, Jose Severino MD    influenza quadrivalent split vaccine (FLUZONE;FLUARIX;FLULAVAL;AFLURIA) injection 0.5 mL, 0.5 mL, Intramuscular, Prior to discharge, Umberto Mari MD    pantoprazole (PROTONIX) 80 mg in sodium chloride 0.9 % 100 mL infusion, 8 mg/hr, Intravenous, Continuous, MASSIMO Esquivel - CNP, Last Rate: 10 mL/hr at 12/23/20 2054, 8 mg/hr at 12/23/20 2054    octreotide (SANDOSTATIN) 500 mcg in sodium chloride 0.9 % 100 mL infusion, 50 mcg/hr, Intravenous, Continuous, MASSIMO Esquivel - CNP, Last Rate: 10 mL/hr at 12/24/20 0945, 50 mcg/hr at 12/24/20 0945    potassium chloride (KLOR-CON M) extended release tablet 40 mEq, 40 mEq, Oral, PRN **OR** potassium bicarb-citric acid (EFFER-K) effervescent tablet 40 mEq, 40 mEq, Oral, PRN **OR** potassium chloride 10 mEq/100 mL IVPB (Peripheral Line), 10 mEq, Intravenous, PRN, Shani Phan MD    LORazepam (ATIVAN) tablet 1 mg, 1 mg, Oral, Q1H PRN **OR** LORazepam (ATIVAN) injection 1 mg, 1 mg, Intravenous, Q1H PRN **OR** LORazepam (ATIVAN) tablet 2 mg, 2 mg, Oral, Q1H PRN **OR** LORazepam (ATIVAN) injection 2 mg, 2 mg, Intravenous, Q1H PRN **OR** LORazepam (ATIVAN) tablet 3 mg, 3 mg, Oral, Q1H PRN **OR** LORazepam (ATIVAN) injection 3 mg, 3 mg, Intravenous, Q1H PRN, 3 mg at 12/24/20 0038 **OR** LORazepam (ATIVAN) tablet 4 mg, 4 mg, Oral, Q1H PRN **OR** LORazepam (ATIVAN) injection 4 mg, 4 mg, Intravenous, Q1H PRN, Dung VELEZ MD, 4 mg at 12/24/20 0616    [Held by provider] folic acid (FOLVITE) tablet 1 mg, 1 mg, Oral, Daily, Antelmo Zaldivar MD    labetalol (NORMODYNE;TRANDATE) injection 20 mg, 20 mg, Intravenous, Q2H PRN, Antelmo Zaldivar MD       ALLERGIES:   No Known Allergies       FAMILY HISTORY: The patient's family history was reviewed.        SOCIAL HISTORY:   Social History     Socioeconomic History    Marital status: Single     Spouse name: Not on file    Number of children: Not on file    Years of education: Not on file    Highest education level: Not on file   Occupational History    Not on file   Social Needs    Financial resource strain: Not on file    Food insecurity     Worry: Not on file     Inability: Not on file    Transportation needs     Medical: Not on file     Non-medical: Not on file   Tobacco Use    Smoking status: Former Smoker     Packs/day: 1.00     Years: 45.00     Pack years: 45.00     Quit date: 1/17/2017     Years since quitting: 3.9    Smokeless tobacco: Never Used   Substance and Sexual Activity    Alcohol use: No     Comment: 7/17/2016 he stopped drinking    Drug use: No     Frequency: 1.0 times per week     Comment: cocaine,  stopped spring 2016    Sexual activity: Yes     Partners: Female   Lifestyle    Physical activity     Days per week: Not on file     Minutes per session: Not on file    Stress: Not on file   Relationships    Social connections     Talks on phone: Not on file     Gets together: Not on file     Attends Taoist service: Not on file     Active member of club or organization: Not on file     Attends meetings of clubs or organizations: Not on file     Relationship status: Not on file    Intimate partner violence     Fear of current or ex partner: Not on file     Emotionally abused: Not on file     Physically abused: Not on file     Forced sexual activity: Not on file   Other Topics Concern    Not on file   Social History Narrative     in the past, retired         REVIEW OF SYSTEMS: A 14-point review of systems was obtained and pertinent positives andnegatives were enumerated above in the history of present illness. All other reviewed systems / symptoms were negative. Review of Systems      PHYSICAL EXAMINATION: Vital signs reviewed per the nursing documentation. BP (!) 153/72   Pulse 98   Temp 97.9 °F (36.6 °C) (Oral)   Resp 16   Ht 5' 10\" (1.778 m)   Wt 233 lb 7.5 oz (105.9 kg)   SpO2 96%   BMI 33.50 kg/m²    [unfilled]   Body mass index is 33.5 kg/m². General: Sleepy. Arousable to painful stimuli. He has been receiving Ativan for agitation. Psych: . Normal affect. Mentation normal   HEENT: PERRLA. Clear conjunctivae and sclerae. Moist oral mucosae, no lesions orulcers. The neck is supple, without lymphadenopathy or jugular venous distension. No masses. Normal thyroid. Cardiovascular: S1 S2 RRR no rubs or murmurs. Pulmonary: clear BL. No accessory muscle usage. Abd Exam: Soft, NT ND, no hepato or spleno megaly, +BS, no ascites. No groin masses or lymphadenopathy. Extremities: No edema. Skin: Warm skin. No skin rash. No spider nevi palmar erythema naildystrophy. Joint: No joint swelling or deformity. Neurological: intact sensory. DTR+.  No asterixis     LABORATORY DATA: Reviewed   Lab Results   Component Value Date    WBC 5.5 12/24/2020    HGB 11.1 (L) 12/24/2020    HCT 33.8 (L) 12/24/2020    MCV 91.9 12/24/2020    PLT 73 (L) 12/24/2020     12/24/2020    K 3.7 12/24/2020     12/24/2020    CO2 20 12/24/2020    BUN 12 12/24/2020    CREATININE 0.52 (L) 12/24/2020    LABPROT 7.7 04/19/2012    LABALBU 2.6 (L) 12/24/2020    BILITOT 4.69 (H) 12/24/2020    ALKPHOS 163 (H) 12/24/2020  (H) 12/24/2020    ALT 25 12/24/2020    INR 1.5 12/23/2020      Lab Results   Component Value Date    RBC 3.68 (L) 12/24/2020    HGB 11.1 (L) 12/24/2020    MCV 91.9 12/24/2020    MCH 30.1 12/24/2020    MCHC 32.8 12/24/2020    RDW 15.2 (H) 12/24/2020    MPV 8.9 12/24/2020    BASOPCT 0 12/24/2020    LYMPHSABS 0.60 (L) 12/24/2020    MONOSABS 0.80 12/24/2020    NEUTROABS 4.10 12/24/2020    EOSABS 0.00 12/24/2020    BASOSABS 0.00 12/24/2020          DIAGNOSTIC TESTING:   Us Liver    Result Date: 12/24/2020  EXAMINATION: ULTRASOUND OF THE LIVER 12/24/2020 10:47 am COMPARISON: February 26, 2018 HISTORY: ORDERING SYSTEM PROVIDED HISTORY: elevated lft hx hep c TECHNOLOGIST PROVIDED HISTORY: elevated lft hx hep c Acuity: Acute Type of Exam: Initial FINDINGS: Pancreas is not visualized. Increased heterogeneous liver echotexture with mild nodular contour of the liver. Portal vein is patent with hepatopetal flow. No right-sided hydronephrosis. Cholelithiasis and gallbladder sludge. No gallbladder wall thickening. No pericholecystic fluid. Normal common bile duct measuring 4 mm. Hepatic cirrhosis. Cholelithiasis and gallbladder sludge without sonographic findings to suggest acute cholecystitis. Fluoro For Surgical Procedures    Result Date: 12/7/2020  Radiology exam is complete. No Radiologist dictation. Please follow up with ordering provider. IMPRESSION: Mr. Sho Troncoso is a 63 y.o. male with alcoholic cirrhosis. Hematemesis and melena. H&H stable overnight. Unfortunately he has been going through delirium tremens with agitation requiring Ativan and restraints. He is currently deeply sedated. We will monitor his H&H closely. Will avoid EGD with active DTs due to increased risk of complications. He would require EGD urgently in case of active bleeding. Start lactulose enemas. Thank you for allowing me to participate in the care of Mr. Sho Troncoso.  For any further questions please do not hesitate to contact me.        MD Isai Zepeda

## 2020-12-24 NOTE — PROGRESS NOTES
Notified residents of patients change throughout the night. Residents in to assess. Awaiting orders. Overall patients assessment unchanged from yesterday except for altered mental status - vital signs stable. Patient unable to follow conversation and most commands. Only oriented to self. Continues in bilateral wrist restraints with 1:1 at bedside. RN will continue to monitor.

## 2020-12-24 NOTE — PROGRESS NOTES
Patient transferred to room 2125 ICU Intermediate from 2090. Connected to cardiac monitor. Patient increased agitation, trying to fight out of restraints. Awaiting precedex gtt from pharmacy, call placed.

## 2020-12-24 NOTE — PROGRESS NOTES
[address] [facility].    PROGRESS NOTE             12/24/2020    9:42 AM    Name:   Eduard Hernandez  MRN:     [id number]     Acct:      [de-identified]   Room:   2090/2090-01   Day:  1  Admit Date:  12/23/2020 10:57 AM    PCP:  MASSIMO Caldwell CNP  Code Status:  Full Code    Subjective:     C/C:   Chief Complaint   Patient presents with    Hematemesis    Weight Loss     Interval History Status: worsened. Patient was seen and examined at the bedside. According to RN, last night patient became combative, agitated, and delirious with incoherent speech. Patient was placed in restraints, sitter at patient's bedside. Patient required 17 mg Ativan with minimal improvement in his mental status. Vitals are stable. On exam patient is oriented only to self. Unable to follow commands. Blood culture , chest x-ray, UDS  and thiamine ordered. According to RN, patient admits to doing cocaine prior to hospital admission. Patient's last alcoholic beverage was on Friday. EGD canceled for today. Liver functions have improved today ALT 25, , total bilirubin 4.69. Per GI, start lactulose enemas. Ammonia 40, CXR pending. Liver ultrasound shows hepatic cirrhosis with cholelithiasis and gallbladder sludge, no evidence of acute cholecystitis  Hemoglobin yesterday 12.4, 11.1 today, platelet 46>04 today. Patient has had no more episodes of hematemesis or bloody stools. AFP 3.4, B12 1321, folate 6.3  Remains on octreotide and Protonix drip. Brief History:     The patient is a 63 y.o. Non-/non  male who presents withHematemesis and Weight Loss   and he is admitted to the hospital for the management of GI bleed. Patient has a past medical history of chronic hepatitis C treated with [facility], hepatic cirrhosis with ascites, GERD, essential hypertension presented to the ER with complaint of nausea and vomiting for 3 days.  Patient states that his vomitus contains bright red blood. Patient is also complaining of black stools. He reports that he was drinking heavily on Friday and woke up the next day with abdominal cramping and projectile vomiting. Patient says that he vomited 7 times since Sunday and has not been able to  eat or drink since his symptoms started and has lost 6 pounds in 1 week. Patient denies any vomiting today and states that he only has dry heaves. On exam, patient has fine tremors with positive finger-to-nose test with impaired coordination and unsteady gait. His last drink was on Friday, ethanol levels less than 10 in ER. Patient drinks 6-8 beers on the weekend or \"enough until he blacks out\". Patient reportedly quit drinking many years back, recently resumed drinking on weekends. According to charts patient had IR guided paracentesis 2016 due to ascites with successful removal of 7 L of clear fluid. Patient has not had paracentesis since then. CT abdomen pelvis done in 2016 showed splenic artery aneurysm, cholelithiasis with CT evidence of cholecystitis. Patient denies surgical history of cholecystectomy.     In the ER FOBT positive, hemoglobin 12.4, potassium 3.6. Liver enzymes elevated , AST 28, alkaline phosphatase 201. Albumin 2.9     Patient put on CIWA protocol, octreotide drip 50 MCG per hour, Protonix drip 8 mg/h. Liver ultrasound, AFP ordered, GI consulted     Review of Systems:     Review of Systems   Unable to perform ROS: Mental status change   Psychiatric/Behavioral: Positive for agitation, behavioral problems, confusion and decreased concentration. The patient is hyperactive. Medications:      Allergies:  No Known Allergies    Current Meds:   Scheduled Meds:    thiamine (VITAMIN B1) IVPB  100 mg Intravenous Daily    [Held by provider] atorvastatin  20 mg Oral Nightly    spironolactone  50 mg Oral Daily    sodium chloride flush  10 mL Intravenous 2 times per day    influenza virus Labs:    [unfilled]    Lab Results   Component Value Date/Time    SPECIAL NOT REPORTED 02/26/2020 04:11 PM     Lab Results   Component Value Date/Time    CULTURE NO GROWTH 07/22/2016 01:36 PM    CULTURE  07/22/2016 01:36 PM     Performed at [facility], [address] [place] [address] [phone]       [facility]    Radiology:    [facility] Surgical Procedures    Result Date: 12/7/2020  Radiology exam is complete. No Radiologist dictation. Please follow up with ordering provider. Physical Examination:        Physical Exam  Constitutional:       General: He is not in acute distress. Appearance: Normal appearance. He is not ill-appearing. HENT:      Head: Normocephalic. Mouth/Throat:      Mouth: Mucous membranes are moist.   Cardiovascular:      Rate and Rhythm: Normal rate and regular rhythm. Pulses: Normal pulses. Heart sounds: Normal heart sounds. No murmur. No gallop. Pulmonary:      Effort: Pulmonary effort is normal. No respiratory distress. Breath sounds: Normal breath sounds. No wheezing. Chest:      Chest wall: No tenderness. Abdominal:      General: Bowel sounds are normal. There is distension. Palpations: Abdomen is soft. There is no mass. Tenderness: There is no abdominal tenderness. Musculoskeletal:         General: No swelling, tenderness or deformity. Neurological:      General: No focal deficit present. Mental Status: He is alert. He is disoriented. Psychiatric:         Attention and Perception: He is inattentive. Mood and Affect: Mood normal.         Behavior: Behavior is agitated. Thought Content:  Thought content is delusional.           Assessment:        Primary Problem  Acute gastrointestinal bleeding    Active Hospital Problems    Diagnosis Date Noted    Acute gastrointestinal bleeding [K92.2] 12/23/2020    Thrombocytopenia (HCC) [D69.6] 12/23/2020    Hepatitis C virus infection resolved after antiviral drug therapy [Z86.19] 12/23/2020    Essential hypertension [I10] 04/24/2019    Hypokalemia [E87.6] 02/04/2019    Cirrhosis ([facility] [K74.60]     Hypomagnesemia [E83.42]     GERD (gastroesophageal reflux disease) [K21.9] 04/19/2012       Plan:        Acute GI bleed likely secondary to esophageal varices  -Hemoglobin 12.4>11.1   -Platelets 16>09  -Monitor platelets and H&H daily  -Patient has history of liver cirrhosis  -GI consulted, no EGD today due to altered mental status  -Octreotide drip 50 MCG per hour, pantoprazole drip 80 mg/h  -INR 1.5, PT 18.3, PTT 37.9    Altered mental status likely secondary to DT  -CIWA protocol  -Ammonia 40  -UDS  -History of alcohol abuse, last drink Friday     Liver cirrhosis 2/2 hepatitis C  -, ALT 28, alk phos 201, bilirubin 5.57 on presentation  -, ALT 25, alk phos 163, bilirubin 4.69 on 12/24  -History of hep C, positive hepatitis C antibody  -Ultrasound on February 2018 significant for findings of cirrhosis, no focal liver lesion identified  -Ultrasound liver hepatic cirrhosis with cholelithiasis and gallbladder sludge, no evidence of acute cholecystitis  -AFP 3.4  -Aldactone 50 mg p.o. daily  -Lactulose enema started per GI     Alcohol abuse  -Thiamine 100 mg p.o. daily   -Folic acid 1 mg p.o. daily  -Folic acid 6.3 and B39 1321  -Fall precautions  -Seizure precautions  -CIWA scale     Hypokalemia  -Potassium 3.6  -Potassium placement protocol     Hypomagnesemia  -Magnesium 1.5  -Magnesium 1 g IV given in ER     Diet:  N.p.o.  GI prophylaxis: Pantoprazole drip 80 mg/h  DVT prophylaxis: EPC cuff, no Lovenox due to bleeding and thrombocytopenia  Isamar Ramirez MD  12/24/2020  9:42 AM

## 2020-12-24 NOTE — PROGRESS NOTES
RN walked in room to help the aide get pt back into bed and put a new IV in. Pt started repeating \"I gotta get out of here\". While trying to get back in bed, pt proceeded to become aggressive towards staff, hitting and kicking. RN got orders from Ramon Mesa NP for STAT 4mg Ativan IM to be given. RN also got orders for non-violent restraints. Pt now in 3 point restraints with a telesitter in the room. Pt still agitated and anxious. Will take out of 3 point and put in bilateral once agression towards staff subsides.       Electronically signed by Rosana Hill RN on 12/24/2020 at 3:07 AM

## 2020-12-24 NOTE — PLAN OF CARE
PRE CONSULT ROUNDING NOTE  HPI  63year old male with pmh of cirrhosis secondary to etoh abuse, hepatitis c treated with raymundo GERD htn who presented to the ED for hematemesis and melena and abdominal cramping for three days after drinking alcohol heavily the previous night. . The pt is currently not responding to commands, he has received 12 mg of ativan total since last night due to aggression and possible  alcohol withdrawal. HPI from RN and electronic record. Per the RN he has not had any BM hematemesis since admission. He was not c/o abdominal pain last night. He does see dr Esther France as an outpt for his cirrhosis. hgb 12.2, this mornings hgb is pending inr 1.5 alk phos 201 ast 135 bili 5.57. afp 3.4 plt 65 covid negative. liver us has been ordered but not completed yet.  No abdominal imaging has been completed    Endoscopy 3/30/18 colonoscopy (pangular) sigmoid polyp removed-tubular adenoma, no record of egd being done      Family reports mom had pancreatic cancer unknown if there is a hx of colon liver or pancreatic cancer  Social quit smoking  Hx etoh abuse unknown if he takes illicit drugs   BP (!) 153/72   Pulse 98   Temp 97.9 °F (36.6 °C) (Oral)   Resp 16   Ht 5' 10\" (1.778 m)   Wt 233 lb 7.5 oz (105.9 kg)   SpO2 96%   BMI 33.50 kg/m²      ROS unable to be completed secondary to AMS   meds labs imaging and past medical records were reviewed    Exam  General Appearance: does not respond to verbal commands will open eyes to tactile stimulation, tremors to extremities present well-developed and well-nourished, in no acute distress  Skin: warm and dry, no rash or erythema  Head: normocephalic and atraumatic  Eyes: pupils equal, round, and reactive to light, extraocular eye movements intact, conjunctivae icteric  ENT: hearing grossly normal bilaterally  Neck: neck supple and non tender without mass, no thyromegaly or thyroid nodules, no cervical lymphadenopathy   Pulmonary/Chest: clear to auscultation bilaterally- no wheezes, rales or rhonchi, normal air movement, no respiratory distress  Cardiovascular: normal rate, regular rhythm, normal S1 and S2, no murmurs, rubs, clicks or gallops, distal pulses intact, no carotid bruits  Abdomen: soft, obese non tender, non-distended, normal bowel sounds, no masses or organomegaly no ascites  Extremities: no cyanosis, clubbing or edema  Musculoskeletal: normal range of motion, no joint swelling, deformity or tenderness  Neurologic: responds to tactile stimulation, opens eyes, tremors to extremities are present    Assessment  Mild anemia with reported hematemesis and melena  Cirrhosis secondary to etoh abuse  Thrombocytopenia secondary to cirrhosis  Elevated lft  Possible DT's  AMS  Hepatitis c -treated    Plan  Case was d/w dr Oseas Watts, will await this mornings hgb if significant drop will do egd  Continue ppi and sandostatin drips  CIWA precautions  Trend hh and keep hgb >7  Liver  US and ammonia level   urine CXR blood culture ordered to r/o sepsis  Formal gi consult to follow  . MASSIMO Jaramillo - CNP

## 2020-12-25 LAB
-: ABNORMAL
ABSOLUTE EOS #: 0.07 K/UL (ref 0–0.4)
ABSOLUTE IMMATURE GRANULOCYTE: ABNORMAL K/UL (ref 0–0.3)
ABSOLUTE LYMPH #: 0.78 K/UL (ref 1–4.8)
ABSOLUTE MONO #: 0.56 K/UL (ref 0.1–1.3)
ALBUMIN SERPL-MCNC: 2.4 G/DL (ref 3.5–5.2)
ALBUMIN/GLOBULIN RATIO: ABNORMAL (ref 1–2.5)
ALP BLD-CCNC: 146 U/L (ref 40–129)
ALT SERPL-CCNC: 24 U/L (ref 5–41)
AMORPHOUS: ABNORMAL
ANION GAP SERPL CALCULATED.3IONS-SCNC: 11 MMOL/L (ref 9–17)
AST SERPL-CCNC: 111 U/L
BACTERIA: ABNORMAL
BASOPHILS # BLD: 1 % (ref 0–2)
BASOPHILS ABSOLUTE: 0.04 K/UL (ref 0–0.2)
BILIRUB SERPL-MCNC: 3.24 MG/DL (ref 0.3–1.2)
BILIRUBIN DIRECT: 2.06 MG/DL
BILIRUBIN URINE: ABNORMAL
BILIRUBIN, INDIRECT: 1.18 MG/DL (ref 0–1)
BUN BLDV-MCNC: 14 MG/DL (ref 8–23)
BUN/CREAT BLD: ABNORMAL (ref 9–20)
CALCIUM SERPL-MCNC: 7.6 MG/DL (ref 8.6–10.4)
CASTS UA: ABNORMAL /LPF
CHLORIDE BLD-SCNC: 108 MMOL/L (ref 98–107)
CO2: 23 MMOL/L (ref 20–31)
COLOR: ABNORMAL
COMMENT UA: ABNORMAL
CREAT SERPL-MCNC: 0.55 MG/DL (ref 0.7–1.2)
CRYSTALS, UA: ABNORMAL /HPF
DIFFERENTIAL TYPE: ABNORMAL
EOSINOPHILS RELATIVE PERCENT: 2 % (ref 0–4)
EPITHELIAL CELLS UA: ABNORMAL /HPF
GFR AFRICAN AMERICAN: >60 ML/MIN
GFR NON-AFRICAN AMERICAN: >60 ML/MIN
GFR SERPL CREATININE-BSD FRML MDRD: ABNORMAL ML/MIN/{1.73_M2}
GFR SERPL CREATININE-BSD FRML MDRD: ABNORMAL ML/MIN/{1.73_M2}
GLOBULIN: ABNORMAL G/DL (ref 1.5–3.8)
GLUCOSE BLD-MCNC: 103 MG/DL (ref 70–99)
GLUCOSE URINE: NEGATIVE
HCT VFR BLD CALC: 34.2 % (ref 41–53)
HCT VFR BLD CALC: 34.6 % (ref 41–53)
HCT VFR BLD CALC: 35.2 % (ref 41–53)
HCT VFR BLD CALC: 36.8 % (ref 41–53)
HCT VFR BLD CALC: 37.1 % (ref 41–53)
HEMOGLOBIN: 11 G/DL (ref 13.5–17.5)
HEMOGLOBIN: 11.1 G/DL (ref 13.5–17.5)
HEMOGLOBIN: 11.2 G/DL (ref 13.5–17.5)
HEMOGLOBIN: 12 G/DL (ref 13.5–17.5)
HEMOGLOBIN: 12 G/DL (ref 13.5–17.5)
IMMATURE GRANULOCYTES: ABNORMAL %
INR BLD: 1.4
IRON SATURATION: 32 % (ref 20–55)
IRON: 57 UG/DL (ref 59–158)
KETONES, URINE: ABNORMAL
LEUKOCYTE ESTERASE, URINE: ABNORMAL
LYMPHOCYTES # BLD: 21 % (ref 24–44)
MCH RBC QN AUTO: 30.6 PG (ref 26–34)
MCHC RBC AUTO-ENTMCNC: 32.5 G/DL (ref 31–37)
MCV RBC AUTO: 94 FL (ref 80–100)
MONOCYTES # BLD: 15 % (ref 1–7)
MORPHOLOGY: ABNORMAL
MUCUS: ABNORMAL
NITRITE, URINE: POSITIVE
NRBC AUTOMATED: ABNORMAL PER 100 WBC
OTHER OBSERVATIONS UA: ABNORMAL
PDW BLD-RTO: 15.3 % (ref 11.5–14.9)
PH UA: 6.5 (ref 5–8)
PHOSPHORUS: 2.3 MG/DL (ref 2.5–4.5)
PLATELET # BLD: 64 K/UL (ref 150–450)
PLATELET ESTIMATE: ABNORMAL
PMV BLD AUTO: 8.5 FL (ref 6–12)
POTASSIUM SERPL-SCNC: 3.9 MMOL/L (ref 3.7–5.3)
PROTEIN UA: NEGATIVE
PROTHROMBIN TIME: 17.5 SEC (ref 11.8–14.6)
RBC # BLD: 3.64 M/UL (ref 4.5–5.9)
RBC # BLD: ABNORMAL 10*6/UL
RBC UA: ABNORMAL /HPF
RENAL EPITHELIAL, UA: ABNORMAL /HPF
SEG NEUTROPHILS: 61 % (ref 36–66)
SEGMENTED NEUTROPHILS ABSOLUTE COUNT: 2.25 K/UL (ref 1.3–9.1)
SODIUM BLD-SCNC: 142 MMOL/L (ref 135–144)
SPECIFIC GRAVITY UA: 1.02 (ref 1–1.03)
TOTAL IRON BINDING CAPACITY: 180 UG/DL (ref 250–450)
TOTAL PROTEIN: 5.7 G/DL (ref 6.4–8.3)
TRANSFERRIN: 154 MG/DL (ref 200–360)
TRICHOMONAS: ABNORMAL
TURBIDITY: CLEAR
UNSATURATED IRON BINDING CAPACITY: 123 UG/DL (ref 112–347)
URINE HGB: NEGATIVE
UROBILINOGEN, URINE: ABNORMAL
WBC # BLD: 3.7 K/UL (ref 3.5–11)
WBC # BLD: ABNORMAL 10*3/UL
WBC UA: ABNORMAL /HPF
YEAST: ABNORMAL

## 2020-12-25 PROCEDURE — 83550 IRON BINDING TEST: CPT

## 2020-12-25 PROCEDURE — APPSS30 APP SPLIT SHARED TIME 16-30 MINUTES: Performed by: NURSE PRACTITIONER

## 2020-12-25 PROCEDURE — 6360000002 HC RX W HCPCS: Performed by: STUDENT IN AN ORGANIZED HEALTH CARE EDUCATION/TRAINING PROGRAM

## 2020-12-25 PROCEDURE — 2060000000 HC ICU INTERMEDIATE R&B

## 2020-12-25 PROCEDURE — 99233 SBSQ HOSP IP/OBS HIGH 50: CPT | Performed by: INTERNAL MEDICINE

## 2020-12-25 PROCEDURE — 6370000000 HC RX 637 (ALT 250 FOR IP): Performed by: NURSE PRACTITIONER

## 2020-12-25 PROCEDURE — 85018 HEMOGLOBIN: CPT

## 2020-12-25 PROCEDURE — 83540 ASSAY OF IRON: CPT

## 2020-12-25 PROCEDURE — 85610 PROTHROMBIN TIME: CPT

## 2020-12-25 PROCEDURE — 99232 SBSQ HOSP IP/OBS MODERATE 35: CPT | Performed by: INTERNAL MEDICINE

## 2020-12-25 PROCEDURE — 2500000003 HC RX 250 WO HCPCS: Performed by: STUDENT IN AN ORGANIZED HEALTH CARE EDUCATION/TRAINING PROGRAM

## 2020-12-25 PROCEDURE — 80076 HEPATIC FUNCTION PANEL: CPT

## 2020-12-25 PROCEDURE — 2580000003 HC RX 258: Performed by: NURSE PRACTITIONER

## 2020-12-25 PROCEDURE — 2580000003 HC RX 258: Performed by: STUDENT IN AN ORGANIZED HEALTH CARE EDUCATION/TRAINING PROGRAM

## 2020-12-25 PROCEDURE — 85025 COMPLETE CBC W/AUTO DIFF WBC: CPT

## 2020-12-25 PROCEDURE — 81001 URINALYSIS AUTO W/SCOPE: CPT

## 2020-12-25 PROCEDURE — 51702 INSERT TEMP BLADDER CATH: CPT

## 2020-12-25 PROCEDURE — 84100 ASSAY OF PHOSPHORUS: CPT

## 2020-12-25 PROCEDURE — 6360000002 HC RX W HCPCS: Performed by: NURSE PRACTITIONER

## 2020-12-25 PROCEDURE — C9113 INJ PANTOPRAZOLE SODIUM, VIA: HCPCS | Performed by: NURSE PRACTITIONER

## 2020-12-25 PROCEDURE — 85014 HEMATOCRIT: CPT

## 2020-12-25 PROCEDURE — 84466 ASSAY OF TRANSFERRIN: CPT

## 2020-12-25 PROCEDURE — 36415 COLL VENOUS BLD VENIPUNCTURE: CPT

## 2020-12-25 PROCEDURE — 80048 BASIC METABOLIC PNL TOTAL CA: CPT

## 2020-12-25 RX ORDER — CALCIUM CARBONATE 500(1250)
500 TABLET ORAL 2 TIMES DAILY
Status: DISCONTINUED | OUTPATIENT
Start: 2020-12-25 | End: 2020-12-27

## 2020-12-25 RX ORDER — ERGOCALCIFEROL 1.25 MG/1
50000 CAPSULE ORAL WEEKLY
Status: DISCONTINUED | OUTPATIENT
Start: 2020-12-25 | End: 2020-12-31 | Stop reason: HOSPADM

## 2020-12-25 RX ORDER — TAMSULOSIN HYDROCHLORIDE 0.4 MG/1
0.4 CAPSULE ORAL DAILY
Status: DISCONTINUED | OUTPATIENT
Start: 2020-12-25 | End: 2020-12-31 | Stop reason: HOSPADM

## 2020-12-25 RX ADMIN — DEXMEDETOMIDINE HYDROCHLORIDE 0.3 MCG/KG/HR: 400 INJECTION INTRAVENOUS at 00:57

## 2020-12-25 RX ADMIN — DEXMEDETOMIDINE HYDROCHLORIDE 0.7 MCG/KG/HR: 400 INJECTION INTRAVENOUS at 09:26

## 2020-12-25 RX ADMIN — SODIUM CHLORIDE 8 MG/HR: 9 INJECTION, SOLUTION INTRAVENOUS at 17:41

## 2020-12-25 RX ADMIN — DEXMEDETOMIDINE HYDROCHLORIDE 1.2 MCG/KG/HR: 400 INJECTION INTRAVENOUS at 17:43

## 2020-12-25 RX ADMIN — THIAMINE HYDROCHLORIDE: 100 INJECTION, SOLUTION INTRAMUSCULAR; INTRAVENOUS at 15:25

## 2020-12-25 RX ADMIN — OCTREOTIDE ACETATE 50 MCG/HR: 1000 INJECTION, SOLUTION INTRAVENOUS; SUBCUTANEOUS at 06:20

## 2020-12-25 RX ADMIN — CEFTRIAXONE SODIUM 1 G: 1 INJECTION, POWDER, FOR SOLUTION INTRAMUSCULAR; INTRAVENOUS at 16:19

## 2020-12-25 RX ADMIN — OCTREOTIDE ACETATE 50 MCG/HR: 1000 INJECTION, SOLUTION INTRAVENOUS; SUBCUTANEOUS at 17:41

## 2020-12-25 RX ADMIN — LACTULOSE: 10 SOLUTION ORAL at 23:00

## 2020-12-25 RX ADMIN — SODIUM CHLORIDE 8 MG/HR: 9 INJECTION, SOLUTION INTRAVENOUS at 06:20

## 2020-12-25 RX ADMIN — DEXMEDETOMIDINE HYDROCHLORIDE 1.3 MCG/KG/HR: 400 INJECTION INTRAVENOUS at 21:08

## 2020-12-25 RX ADMIN — DEXMEDETOMIDINE HYDROCHLORIDE 1 MCG/KG/HR: 400 INJECTION INTRAVENOUS at 14:10

## 2020-12-25 RX ADMIN — DEXTROSE AND SODIUM CHLORIDE: 5; 900 INJECTION, SOLUTION INTRAVENOUS at 14:07

## 2020-12-25 NOTE — PLAN OF CARE
Problem: Falls - Risk of:  Goal: Will remain free from falls  Description: Will remain free from falls  12/25/2020 1731 by Maximo Phillips RN  Outcome: Ongoing  Note: Patient remained free from falls. Call light within reach. Problem: Restraint Use - Nonviolent/Non-Self-Destructive Behavior:  Goal: Absence of restraint indications  Description: Absence of restraint indications  12/25/2020 1731 by Maximo Phillips RN  Outcome: Ongoing  Note: Patient still needed restraints as he kept trying to get out of bed stating, \"I need to leave now\". Problem: Skin Integrity:  Goal: Will show no infection signs and symptoms  Description: Will show no infection signs and symptoms  12/25/2020 1731 by Maximo Phillips RN  Outcome: Ongoing  Note: Patient received antibiotics as prescribed. Problem: Skin Integrity:  Goal: Absence of new skin breakdown  Description: Absence of new skin breakdown  12/25/2020 1731 by Maximo Phillips RN  Outcome: Ongoing  Note: No new alterations in skin integrity.

## 2020-12-25 NOTE — PROGRESS NOTES
[address] [facility].    PROGRESS NOTE             12/25/2020    10:03 AM    Name:   Hilary Taylor  MRN:     [id number]     Acct:      [de-identified]   Room:   212/2125-  IP Day:  2  Admit Date:  12/23/2020 10:57 AM    PCP:  MASSIMO Dodson CNP  Code Status:  Full Code    Subjective:     C/C:   Chief Complaint   Patient presents with    Hematemesis    Weight Loss     Interval History Status: not changed. Patient was seen and examined at the bedside. No acute events overnight. Patient on Precedex drip 7 MCG per KG per hour. Patient is still oriented to only person, having hallucinations. 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 Patient patient is still irritable and fighting restraints. Patient falls asleep for short amount of time and wakes up very aggravated. Patient is requiring oxygen via nasal cannula 2 L/min due to SPO2 of 91 this morning. Hepatic function panel: ALT 24, , total bilirubin 3.24, protein yesterday 4.69. Hemoglobin stable at 11  Patient is n.p.o. however no plans at this time for EGD due to inability to protect his airway. Normal episodes of hematemesis. Patient receiving Rocephin IV for urinalysis showing positive nitrite, small leuk esterase and moderate bacteria. Phosphorus is 2.3, will initiate replacement therapy, magnesium 1.5, receiving Mag-Ox 400 mg p.o. daily  Iron studies ordered      Brief History:   The patient is a 60 y.o.  Non-/non  male who presents withHematemesis and Weight Loss   and he is admitted to the hospital for the management of GI bleed. Patient has a past medical history of chronic hepatitis C treated with Harvani, hepatic cirrhosis with ascites, GERD, essential hypertension presented to the ER with complaint of nausea and vomiting for 3 days. Patient states that his vomitus contains bright red blood. Patient is also complaining of black stools. He reports that he was drinking heavily on Friday and woke up the next day with abdominal cramping and projectile vomiting.  Patient says that he vomited 7 times since Sunday and has not been able to  eat or drink since his symptoms started and has lost 6 pounds in 1 week.  Patient denies any vomiting today and states that he only has dry heaves. On exam, patient has fine tremors with positive finger-to-nose test with impaired coordination and unsteady gait. His last drink was on Friday, ethanol levels less than 10 in ER. Patient drinks 6-8 beers on the weekend or \"enough until he blacks out\".  Patient reportedly quit drinking many years back, recently resumed drinking on weekends. According to charts patient had IR guided paracentesis 2016 due to ascites with successful removal of 7 L of clear fluid. Patient has not had paracentesis since then.  CT abdomen pelvis done in 2016 showed splenic artery aneurysm, cholelithiasis with CT evidence of cholecystitis. Patient denies surgical history of cholecystectomy.     In the ER FOBT positive, hemoglobin 12.4, potassium 3.6.  Liver enzymes elevated , AST 28, alkaline phosphatase 201.  Albumin 2.9     Patient put on CIWA     Review of Systems:     Review of Systems   Reason unable to perform ROS: Patient in delirium tremens. Psychiatric/Behavioral: Positive for agitation, behavioral problems, confusion, decreased concentration, hallucinations and sleep disturbance. Medications:      Allergies:  No Known Allergies    Current Meds:   Scheduled Meds:    tamsulosin  0.4 mg Oral Daily    calcium elemental  500 mg Oral BID    vitamin D  50,000 Units Oral Weekly    magnesium oxide  400 mg Oral Daily    lactulose enema   Rectal BID    cefTRIAXone (ROCEPHIN) IV  1 g Intravenous Q34K    folic acid, thiamine, multi-vitamin with vitamin K infusion   Intravenous Q24H    [Held by provider] atorvastatin  20 mg Oral 12/25/2020 0556  Gross per 24 hour   Intake 1577.2 ml   Output 800 ml   Net 777.2 ml       Labs:    [unfilled]    Lab Results   Component Value Date/Time    SPECIAL 4 ML PURPLE RT [facility], [facility] 12/24/2020 09:54 AM     Lab Results   Component Value Date/Time    CULTURE NO GROWTH 2 HOURS 12/24/2020 09:54 AM       [unfilled]    Radiology:    Us Liver    Result Date: 12/24/2020  EXAMINATION: ULTRASOUND OF THE LIVER 12/24/2020 10:47 am COMPARISON: February 26, 2018 HISTORY: ORDERING SYSTEM PROVIDED HISTORY: elevated lft hx hep c TECHNOLOGIST PROVIDED HISTORY: elevated lft hx hep c Acuity: Acute Type of Exam: Initial FINDINGS: Pancreas is not visualized. Increased heterogeneous liver echotexture with mild nodular contour of the liver. Portal vein is patent with hepatopetal flow. No right-sided hydronephrosis. Cholelithiasis and gallbladder sludge. No gallbladder wall thickening. No pericholecystic fluid. Normal common bile duct measuring 4 mm. Hepatic cirrhosis. Cholelithiasis and gallbladder sludge without sonographic findings to suggest acute cholecystitis. Xr Chest Portable    Result Date: 12/24/2020  EXAMINATION: ONE XRAY VIEW OF THE CHEST 12/24/2020 2:50 pm COMPARISON: None. HISTORY: ORDERING SYSTEM PROVIDED HISTORY: AMS TECHNOLOGIST PROVIDED HISTORY: AMS Reason for Exam: AMS Acuity: Acute Type of Exam: Initial FINDINGS: A single frontal view of the chest was obtained. There is no acute skeletal abnormality. The lung volumes are low. The heart size is enlarged. The mediastinal contours are accentuated by low lung volumes, and are felt to be within normal limits. There are scattered calcified granulomata. There is pulmonary vascular congestion, without overt edema. No focus of acute airspace consolidation is identified. There is no evidence of a pneumothorax. 1. Low lung volumes, without acute airspace consolidation. 2. Pulmonary vascular congestion, without overt edema. 3. Cardiomegaly. Fluoro For Surgical Procedures    Result Date: 12/7/2020  Radiology exam is complete. No Radiologist dictation. Please follow up with ordering provider. Physical Examination:        Physical Exam  Constitutional:       General: He is not in acute distress. Appearance: Normal appearance. He is not ill-appearing. HENT:      Head: Normocephalic. Mouth/Throat:      Mouth: Mucous membranes are moist.   Cardiovascular:      Rate and Rhythm: Normal rate and regular rhythm. Pulses: Normal pulses. Heart sounds: Normal heart sounds. No murmur. No gallop. Pulmonary:      Effort: Pulmonary effort is normal. No respiratory distress. Breath sounds: Normal breath sounds. No wheezing. Chest:      Chest wall: No tenderness. Abdominal:      General: Bowel sounds are normal. There is no distension. Palpations: Abdomen is soft. There is no mass. Tenderness: There is no abdominal tenderness. Musculoskeletal:         General: No swelling, tenderness or deformity. Neurological:      General: No focal deficit present. Mental Status: He is alert and oriented to person, place, and time. Psychiatric:         Mood and Affect: Mood normal.         Behavior: Behavior normal.         Thought Content:  Thought content normal.           Assessment:        Primary Problem  Acute gastrointestinal bleeding    Active Hospital Problems    Diagnosis Date Noted    Gastrointestinal hemorrhage with melena [K92.1]     Alcohol abuse [F10.10]     Altered mental status [R41.82]     Acute gastrointestinal bleeding [K92.2] 12/23/2020    [facility] [D69.6] 12/23/2020    Hepatitis C virus infection resolved after antiviral drug therapy [Z86.19] 12/23/2020    Elevated LFTs [R79.89] 08/12/2020    Essential hypertension [I10] 04/24/2019    Hypokalemia [E87.6] 02/04/2019    Cirrhosis ([facility] [K74.60]     Hypomagnesemia [E83.42]     DTs (delirium tremens) ([facility] [F10.231] 07/20/2016  GERD (gastroesophageal reflux disease) [K21.9] 04/19/2012       Plan:        Acute GI bleed likely secondary to esophageal varices  -Hemoglobin 10.7>11.1, no evidence of further bleed  -Platelets 53>70>31  -Monitor platelets and H&H daily, hemoglobin 11.1  -Patient has history of liver cirrhosis  -GI consulted, no EGD today due to altered mental status  -Octreotide drip 50 MCG per hour, pantoprazole drip 80 mg/h  -INR 1.5, PT 18.3, PTT 37.9     Altered mental status likely secondary to DT  -CIWA protocol  -Patient on Precedex drip  -Ammonia 40  -UDS negative  -History of alcohol abuse, last drink reported to be Friday     Liver cirrhosis 2/2 hepatitis C  -, ALT 28, alk phos 201, bilirubin 5.57 on presentation  -AST  111, ALT 24, alk phos 146, bilirubin 3.24  -History of hep C, positive hepatitis C antibody  -Ultrasound on February 2018 significant for findings of cirrhosis, no focal liver lesion identified  -Ultrasound liver hepatic cirrhosis with cholelithiasis and gallbladder sludge, no evidence of acute cholecystitis  -AFP 3.4  -Aldactone 50 mg p.o. daily  -Lactulose enema started per GI     Alcohol abuse  -Thiamine 100 mg p.o. daily   -Folic acid 1 mg p.o. daily  -Folic acid 6.3 and O31 1321  -Fall precautions  -Seizure precautions  -CIWA scale    UTI  -UA positive for moderate bacteria, positive nitrite, trace leukocyte esterase  -Rocephin 1 g every 24 hours      Hypokalemia-resolving  -Potassium 3.9  -Potassium placement protocol     Hypomagnesemia  -Magnesium 1.5  -Magnesium 1 g IV given in ER  -Mag-Ox 400 mg p.o. when patient is able to tolerate oral    Hypophosphatemia  -Phosphorus 2.3  -Oral phosphate ordered when patient is able to tolerate oral    Hypocalcemia  -Calcium 7.6  -Patient on vitamin D 50,000 IU, vitamin D decreased on 7/2020 at 20.3    DVT prophylaxis: EPC cuff, no chemical prophylaxis due to hematemesis and thrombocytopenia  GI prophylaxis:  Nneka Gonzalez MD  12/25/2020  10:03 AM

## 2020-12-25 NOTE — PROGRESS NOTES
[facility]    Gastroenterology Daily Progress Note      Patient:   Nick Sánchez   :       Facility:   [facility]   Date:     2020  Consultant:   Munira Mascorro, CNP      SUBJECTIVE  63 y.o. male admitted 2020 with Acute gastrointestinal bleeding [K92.2] and seen for cirrhosis with reported hematemesis, DT's. The pt was seen and examined. He became further agitated last night and was started on a precidex drip, he remains in 4 point restraints today. He will wake up with verbal stimulation and is oriented to person. No c/o abdominal pain. RN stated that the rectal lactulose was not given last night. No hematemesis or BM overnight. Liver us showed cirrhosis with cholelithiasis. lft's are trending down.         OBJECTIVE  Scheduled Meds:   tamsulosin  0.4 mg Oral Daily    calcium elemental  500 mg Oral BID    vitamin D  50,000 Units Oral Weekly    magnesium oxide  400 mg Oral Daily    lactulose enema   Rectal BID    cefTRIAXone (ROCEPHIN) IV  1 g Intravenous O01N    folic acid, thiamine, multi-vitamin with vitamin K infusion   Intravenous Q24H    [Held by provider] atorvastatin  20 mg Oral Nightly    spironolactone  50 mg Oral Daily    sodium chloride flush  10 mL Intravenous 2 times per day    influenza virus vaccine  0.5 mL Intramuscular Prior to discharge       Vital Signs:  /79   Pulse 81   Temp 97.9 °F (36.6 °C) (Axillary)   Resp 16   Ht 5' 10\" (1.778 m)   Wt 229 lb 4.5 oz (104 kg)   SpO2 96%   BMI 32.90 kg/m²      Physical Exam:     General Appearance: lethargic and oriented to person-on precidex drip,  well-developed and well-nourished, in no acute distress  Skin: warm and dry, no rash or erythema  Head: normocephalic and atraumatic  Eyes: pupils equal, round, and reactive to light, extraocular eye movements intact, conjunctivae icteric  ENT: hearing grossly normal bilaterally  Neck: neck supple and non tender without mass, no thyromegaly or thyroid nodules, no cervical lymphadenopathy   Pulmonary/Chest: clear to auscultation bilaterally- no wheezes, rales or rhonchi, normal air movement, no respiratory distress  Cardiovascular: normal rate, regular rhythm, normal S1 and S2, no murmurs, rubs, clicks or gallops, distal pulses intact, no carotid bruits  Abdomen: soft, obese non-tender, non-distended, normal bowel sounds, no masses or organomegaly  Extremities: no cyanosis, clubbing or edema  Musculoskeletal: normal range of motion, no joint swelling, deformity or tenderness  Neurologic: tremors noted, pt will respond to verbal stimuli and is oriented to person only    Lab and Imaging Review     CBC  Recent Labs     12/23/20  1200 12/23/20  1200 12/24/20  0923 12/24/20  1659 12/25/20  0026 12/25/20  0442   WBC 5.1  --  5.5  --   --  3.7   HGB 12.4*   < > 11.1* 10.7* 11.2* 11.1*  11.0*   HCT 37.4*   < > 33.8* 33.3* 35.2* 34.2*  34.6*   MCV 91.2  --  91.9  --   --  94.0   PLT 65*  --  73*  --   --  64*    < > = values in this interval not displayed. BMP  Recent Labs     12/23/20  1200 12/24/20  0923 12/25/20  0442    139 142   K 3.6* 3.7 3.9   CL 99 102 108*   CO2 23 20 23   BUN 10 12 14   CREATININE 0.59* 0.52* 0.55*   GLUCOSE 80 98 103*   CALCIUM 8.8 8.1* 7.6*       LFTS  Recent Labs     12/23/20  1200 12/24/20  0923 12/25/20  0442   ALKPHOS 201* 163* 146*   ALT 28 25 24   * 112* 111*   PROT 6.5 6.0* 5.7*   BILITOT 5.57* 4.69* 3.24*   BILIDIR  --  2.88* 2.06*   LABALBU 2.9* 2.6* 2.4*       AMYLASE/LIPASE/AMMONIA  Recent Labs     12/23/20  1200 12/24/20  0954   LIPASE 11*  --    AMMONIA  --  40       PT/INR  Recent Labs     12/23/20  1200   PROTIME 18.3*   INR 1.5         ANEMIA STUDIES  Recent Labs     12/23/20  1716 12/24/20  0923   FERRITIN  --  187   [id number] 1321*  --    FOLATE 6.3  --    Results for Marsha Rubalcava (MRN [id number]) as of 12/25/2020 12:21   Ref.  Range 12/23/2020 17:09   AFP (Alpha Fetoprotein) Latest Ref Range: <8.4 ug/L 3.4     Liver us 12/24/20   Pancreas is not visualized.       Increased heterogeneous liver echotexture with mild nodular contour of the   liver.  Portal vein is patent with hepatopetal flow.       No right-sided hydronephrosis.       Cholelithiasis and gallbladder sludge.  No gallbladder wall thickening.  No   pericholecystic fluid.  Normal common bile duct measuring 4 mm.           Impression   Hepatic cirrhosis.       Cholelithiasis and gallbladder sludge without sonographic findings to suggest   acute cholecystitis. ASSESSMENT/PLAN  1.cirrhosis secondary to etoh abuse, currently going thru DT's  -recc npo for now due to risk of aspiration  -RN informed to start lactulose enemas  -continue ciwa protocol     2.anemia with hematemesis; hgb currently stable no hematemesis overnight  -continue ppi and sandostatin drips  -trend hh and keep hgb >7  -egd when stable unless overt bleeding occurs    3.hepatitis c -treated with harvoni       This plan was formulated in collaboration with . Electronically signed by: MASSIMO Resendiz - CNP on 12/25/2020 at 9:21 AM     Attending Physician Statement  I have discussed the care of Abiodun Peck and   I have examined the patient myselft independently, and taken ros and hpi , including pertinent history and exam findings,  with the author of this note . I have reviewed the key elements of all parts of the encounter with the nurse practitioner/resident.     I agree with the assessment, plan and orders as documented by the above health care provider         Electronically signed by Obdulia Valle MD

## 2020-12-25 NOTE — PROGRESS NOTES
Physical Therapy  DATE: 2020    NAME: Chaparrita Barnard  MRN: [id number]   : 1960    Patient not seen this date for Physical Therapy due to:  [] Blood transfusion in progress  [] Hemodialysis  [] Patient Declined  [] Spine Precautions   [] Strict Bedrest  [] Surgery/ Procedure  [] Testing      [x] Other- pt not appropriate at this time- in restraints/hallucinations        [] PT is being discontinued at this time. Patient independent. No further needs. [] PT is being discontinued at this time due to declining physical/ medical status. Therapy is not appropriate at this time.     Giovana Bosch, PT

## 2020-12-25 NOTE — PLAN OF CARE
Problem: Falls - Risk of:  Goal: Will remain free from falls  Description: Will remain free from falls  12/25/2020 0015 by Harvel Mohs, RN  Outcome: Ongoing  12/24/2020 1719 by Cherrie Rapp RN  Outcome: Ongoing  Note: Patient agitated and confused. Attempts to hit and kick staff. Patient medicated for agitation. Restraints applied. 1:1 sitter at bedside. Problem: Falls - Risk of:  Goal: Will remain free from falls  Description: Will remain free from falls  12/25/2020 0015 by Harvel Mohs, RN  Outcome: Ongoing     Problem: Falls - Risk of:  Goal: Absence of physical injury  Description: Absence of physical injury  12/25/2020 0015 by Harvel Mohs, RN  Outcome: Ongoing  12/24/2020 1719 by Cherrie Rapp RN  Outcome: Ongoing  Note: Patient hitting and kicking staff. Restraints applied. ROM and restraints released every 1-2 hours. 1:1 continued.     Pt has had zero falls or injuries so far this shift

## 2020-12-26 PROBLEM — E83.51 HYPOCALCEMIA: Status: ACTIVE | Noted: 2020-12-26

## 2020-12-26 PROBLEM — E83.39 HYPOPHOSPHATEMIA: Status: ACTIVE | Noted: 2020-12-26

## 2020-12-26 PROBLEM — N39.0 UTI (URINARY TRACT INFECTION): Status: ACTIVE | Noted: 2020-12-26

## 2020-12-26 LAB
ABSOLUTE EOS #: 0 K/UL (ref 0–0.4)
ABSOLUTE IMMATURE GRANULOCYTE: ABNORMAL K/UL (ref 0–0.3)
ABSOLUTE LYMPH #: 0.74 K/UL (ref 1–4.8)
ABSOLUTE MONO #: 0.56 K/UL (ref 0.1–1.3)
ALBUMIN SERPL-MCNC: 2.6 G/DL (ref 3.5–5.2)
ALBUMIN/GLOBULIN RATIO: ABNORMAL (ref 1–2.5)
ALP BLD-CCNC: 141 U/L (ref 40–129)
ALT SERPL-CCNC: 28 U/L (ref 5–41)
ANION GAP SERPL CALCULATED.3IONS-SCNC: 13 MMOL/L (ref 9–17)
AST SERPL-CCNC: 121 U/L
BASOPHILS # BLD: 0 % (ref 0–2)
BASOPHILS ABSOLUTE: 0 K/UL (ref 0–0.2)
BILIRUB SERPL-MCNC: 2.96 MG/DL (ref 0.3–1.2)
BILIRUBIN DIRECT: 1.77 MG/DL
BILIRUBIN, INDIRECT: 1.19 MG/DL (ref 0–1)
BUN BLDV-MCNC: 12 MG/DL (ref 8–23)
BUN/CREAT BLD: ABNORMAL (ref 9–20)
CALCIUM SERPL-MCNC: 7.5 MG/DL (ref 8.6–10.4)
CHLORIDE BLD-SCNC: 107 MMOL/L (ref 98–107)
CO2: 22 MMOL/L (ref 20–31)
CREAT SERPL-MCNC: 0.66 MG/DL (ref 0.7–1.2)
DIFFERENTIAL TYPE: ABNORMAL
EOSINOPHILS RELATIVE PERCENT: 0 % (ref 0–4)
GFR AFRICAN AMERICAN: >60 ML/MIN
GFR NON-AFRICAN AMERICAN: >60 ML/MIN
GFR SERPL CREATININE-BSD FRML MDRD: ABNORMAL ML/MIN/{1.73_M2}
GFR SERPL CREATININE-BSD FRML MDRD: ABNORMAL ML/MIN/{1.73_M2}
GLOBULIN: ABNORMAL G/DL (ref 1.5–3.8)
GLUCOSE BLD-MCNC: 157 MG/DL (ref 70–99)
HCT VFR BLD CALC: 35.5 % (ref 41–53)
HCT VFR BLD CALC: 35.6 % (ref 41–53)
HEMOGLOBIN: 11.5 G/DL (ref 13.5–17.5)
HEMOGLOBIN: 11.7 G/DL (ref 13.5–17.5)
IMMATURE GRANULOCYTES: ABNORMAL %
LYMPHOCYTES # BLD: 20 % (ref 24–44)
MAGNESIUM: 1.6 MG/DL (ref 1.6–2.6)
MCH RBC QN AUTO: 30.1 PG (ref 26–34)
MCHC RBC AUTO-ENTMCNC: 32.5 G/DL (ref 31–37)
MCV RBC AUTO: 92.6 FL (ref 80–100)
MONOCYTES # BLD: 15 % (ref 1–7)
MORPHOLOGY: ABNORMAL
NRBC AUTOMATED: ABNORMAL PER 100 WBC
PDW BLD-RTO: 15.1 % (ref 11.5–14.9)
PLATELET # BLD: 66 K/UL (ref 150–450)
PLATELET ESTIMATE: ABNORMAL
PMV BLD AUTO: 8.5 FL (ref 6–12)
POTASSIUM SERPL-SCNC: 3.5 MMOL/L (ref 3.7–5.3)
RBC # BLD: 3.83 M/UL (ref 4.5–5.9)
RBC # BLD: ABNORMAL 10*6/UL
SEG NEUTROPHILS: 65 % (ref 36–66)
SEGMENTED NEUTROPHILS ABSOLUTE COUNT: 2.4 K/UL (ref 1.3–9.1)
SODIUM BLD-SCNC: 142 MMOL/L (ref 135–144)
TOTAL PROTEIN: 6.1 G/DL (ref 6.4–8.3)
WBC # BLD: 3.7 K/UL (ref 3.5–11)
WBC # BLD: ABNORMAL 10*3/UL

## 2020-12-26 PROCEDURE — 6360000002 HC RX W HCPCS: Performed by: STUDENT IN AN ORGANIZED HEALTH CARE EDUCATION/TRAINING PROGRAM

## 2020-12-26 PROCEDURE — 6360000002 HC RX W HCPCS: Performed by: NURSE PRACTITIONER

## 2020-12-26 PROCEDURE — 94761 N-INVAS EAR/PLS OXIMETRY MLT: CPT

## 2020-12-26 PROCEDURE — 2580000003 HC RX 258: Performed by: INTERNAL MEDICINE

## 2020-12-26 PROCEDURE — 2580000003 HC RX 258: Performed by: NURSE PRACTITIONER

## 2020-12-26 PROCEDURE — 2060000000 HC ICU INTERMEDIATE R&B

## 2020-12-26 PROCEDURE — C9113 INJ PANTOPRAZOLE SODIUM, VIA: HCPCS | Performed by: NURSE PRACTITIONER

## 2020-12-26 PROCEDURE — 80048 BASIC METABOLIC PNL TOTAL CA: CPT

## 2020-12-26 PROCEDURE — 99233 SBSQ HOSP IP/OBS HIGH 50: CPT | Performed by: INTERNAL MEDICINE

## 2020-12-26 PROCEDURE — 6370000000 HC RX 637 (ALT 250 FOR IP): Performed by: NURSE PRACTITIONER

## 2020-12-26 PROCEDURE — 85025 COMPLETE CBC W/AUTO DIFF WBC: CPT

## 2020-12-26 PROCEDURE — 80076 HEPATIC FUNCTION PANEL: CPT

## 2020-12-26 PROCEDURE — 83735 ASSAY OF MAGNESIUM: CPT

## 2020-12-26 PROCEDURE — 2700000000 HC OXYGEN THERAPY PER DAY

## 2020-12-26 PROCEDURE — 2580000003 HC RX 258: Performed by: STUDENT IN AN ORGANIZED HEALTH CARE EDUCATION/TRAINING PROGRAM

## 2020-12-26 PROCEDURE — 85018 HEMOGLOBIN: CPT

## 2020-12-26 PROCEDURE — 85014 HEMATOCRIT: CPT

## 2020-12-26 PROCEDURE — 36415 COLL VENOUS BLD VENIPUNCTURE: CPT

## 2020-12-26 PROCEDURE — 99232 SBSQ HOSP IP/OBS MODERATE 35: CPT | Performed by: INTERNAL MEDICINE

## 2020-12-26 PROCEDURE — 2500000003 HC RX 250 WO HCPCS: Performed by: STUDENT IN AN ORGANIZED HEALTH CARE EDUCATION/TRAINING PROGRAM

## 2020-12-26 RX ORDER — CHLORDIAZEPOXIDE HYDROCHLORIDE 25 MG/1
25 CAPSULE, GELATIN COATED ORAL 4 TIMES DAILY
Status: DISCONTINUED | OUTPATIENT
Start: 2020-12-26 | End: 2020-12-29

## 2020-12-26 RX ORDER — SODIUM CHLORIDE 9 MG/ML
INJECTION, SOLUTION INTRAVENOUS CONTINUOUS
Status: DISCONTINUED | OUTPATIENT
Start: 2020-12-27 | End: 2020-12-30

## 2020-12-26 RX ORDER — 0.9 % SODIUM CHLORIDE 0.9 %
1000 INTRAVENOUS SOLUTION INTRAVENOUS ONCE
Status: COMPLETED | OUTPATIENT
Start: 2020-12-26 | End: 2020-12-27

## 2020-12-26 RX ORDER — LORAZEPAM 2 MG/ML
2 INJECTION INTRAMUSCULAR
Status: DISCONTINUED | OUTPATIENT
Start: 2020-12-26 | End: 2020-12-28

## 2020-12-26 RX ADMIN — DEXMEDETOMIDINE HYDROCHLORIDE 0.5 MCG/KG/HR: 400 INJECTION INTRAVENOUS at 15:37

## 2020-12-26 RX ADMIN — OCTREOTIDE ACETATE 50 MCG/HR: 1000 INJECTION, SOLUTION INTRAVENOUS; SUBCUTANEOUS at 15:37

## 2020-12-26 RX ADMIN — OCTREOTIDE ACETATE 50 MCG/HR: 1000 INJECTION, SOLUTION INTRAVENOUS; SUBCUTANEOUS at 03:58

## 2020-12-26 RX ADMIN — LACTULOSE: 10 SOLUTION ORAL at 13:14

## 2020-12-26 RX ADMIN — DEXMEDETOMIDINE HYDROCHLORIDE 1.3 MCG/KG/HR: 400 INJECTION INTRAVENOUS at 03:58

## 2020-12-26 RX ADMIN — DEXMEDETOMIDINE HYDROCHLORIDE 0.8 MCG/KG/HR: 400 INJECTION INTRAVENOUS at 14:21

## 2020-12-26 RX ADMIN — DEXMEDETOMIDINE HYDROCHLORIDE 1.3 MCG/KG/HR: 400 INJECTION INTRAVENOUS at 07:12

## 2020-12-26 RX ADMIN — SODIUM CHLORIDE 8 MG/HR: 9 INJECTION, SOLUTION INTRAVENOUS at 03:58

## 2020-12-26 RX ADMIN — DEXMEDETOMIDINE HYDROCHLORIDE 1.3 MCG/KG/HR: 400 INJECTION INTRAVENOUS at 01:10

## 2020-12-26 RX ADMIN — CEFTRIAXONE SODIUM 1 G: 1 INJECTION, POWDER, FOR SOLUTION INTRAMUSCULAR; INTRAVENOUS at 16:35

## 2020-12-26 RX ADMIN — LORAZEPAM 2 MG: 2 INJECTION INTRAMUSCULAR; INTRAVENOUS at 13:14

## 2020-12-26 RX ADMIN — THIAMINE HYDROCHLORIDE: 100 INJECTION, SOLUTION INTRAMUSCULAR; INTRAVENOUS at 17:18

## 2020-12-26 RX ADMIN — SODIUM CHLORIDE 1000 ML: 9 INJECTION, SOLUTION INTRAVENOUS at 21:39

## 2020-12-26 RX ADMIN — LACTULOSE: 10 SOLUTION ORAL at 21:40

## 2020-12-26 RX ADMIN — SODIUM CHLORIDE 8 MG/HR: 9 INJECTION, SOLUTION INTRAVENOUS at 15:37

## 2020-12-26 RX ADMIN — MAGNESIUM SULFATE HEPTAHYDRATE 2 G: 500 INJECTION, SOLUTION INTRAMUSCULAR; INTRAVENOUS at 16:35

## 2020-12-26 ASSESSMENT — PAIN SCALES - WONG BAKER
WONGBAKER_NUMERICALRESPONSE: 0
WONGBAKER_NUMERICALRESPONSE: 0

## 2020-12-26 ASSESSMENT — PAIN SCALES - GENERAL: PAINLEVEL_OUTOF10: 0

## 2020-12-26 NOTE — PROGRESS NOTES
[address] [facility].    PROGRESS NOTE             12/26/2020    9:00 AM    Name:   Dick Eng  MRN:     [id number]     Acct:      [de-identified]   Room:   2125/2125-01  IP Day:  3  Admit Date:  12/23/2020 10:57 AM    PCP:  MASSIMO Esposito CNP  Code Status:  Full Code    Subjective:     C/C:   Chief Complaint   Patient presents with    Hematemesis    Weight Loss     Interval History Status: not changed. Patient seen and examined at the bedside. No acute events overnight. Patient is on two-point restraints, currently sedated with Precedex. Will attempt to wean Precedex and cover with Ativan IV per ANGELO. LFTs improving, patient n.p.o., on sandostatin and Protonix drip, Rocephin IV for SBP prophylaxis. Plan for EGD when patient is stable unless overt bleeding occurs according to GI. Hemoglobin is stable at 11.7. Patient is +2.5 L since admission, urine output 900 mL in 24 hours. Brief History:     The patient is a 60 y.o.  Non-/non  male who presents withHematemesis and Weight Loss   and he is admitted to the hospital for the management of GI bleed. Patient has a past medical history of chronic hepatitis C treated with Harvani, hepatic cirrhosis with ascites, GERD, essential hypertension presented to the ER with complaint of nausea and vomiting for 3 days. Patient states that his vomitus contains bright red blood. Patient is also complaining of black stools. He reports that he was drinking heavily on Friday and woke up the next day with abdominal cramping and projectile vomiting.  Patient says that he vomited 7 times since Sunday and has not been able to  eat or drink since his symptoms started and has lost 6 pounds in 1 week.  Patient denies any vomiting today and states that he only has dry heaves. On exam, patient has fine tremors with positive finger-to-nose test with impaired coordination and unsteady gait. His last drink was on Friday, ethanol levels less than 10 in ER. Patient drinks 6-8 beers on the weekend or \"enough until he blacks out\".  Patient reportedly quit drinking many years back, recently resumed drinking on weekends. According to charts patient had IR guided paracentesis 2016 due to ascites with successful removal of 7 L of clear fluid. Patient has not had paracentesis since then.  CT abdomen pelvis done in 2016 showed splenic artery aneurysm, cholelithiasis with CT evidence of cholecystitis. Patient denies surgical history of cholecystectomy.     In the ER FOBT positive, hemoglobin 12.4, potassium 3.6.  Liver enzymes elevated , AST 28, alkaline phosphatase 201.  Albumin 2.9     Patient put on CIWA     Review of Systems:     Review of Systems   Reason unable to perform ROS: Patient in delirium tremens, unable to obtain ROS. Medications:      Allergies:  No Known Allergies    Current Meds:   Scheduled Meds:    tamsulosin  0.4 mg Oral Daily    calcium elemental  500 mg Oral BID    vitamin D  50,000 Units Oral Weekly    magnesium oxide  400 mg Oral Daily    phosphorus  250 mg Oral BID    lactulose enema   Rectal BID    cefTRIAXone (ROCEPHIN) IV  1 g Intravenous Q74K    folic acid, thiamine, multi-vitamin with vitamin K infusion   Intravenous Q24H    [Held by provider] atorvastatin  20 mg Oral Nightly    spironolactone  50 mg Oral Daily    sodium chloride flush  10 mL Intravenous 2 times per day    influenza virus vaccine  0.5 mL Intramuscular Prior to discharge     Continuous Infusions:    IV infusion builder 50 mL/hr at 12/25/20 1407    dexmedetomidine 1.3 mcg/kg/hr (12/26/20 0712)    pantoprozole (PROTONIX) infusion 8 mg/hr (12/26/20 0358)    octreotide (SANDOSTATIN) infusion 50 mcg/hr (12/26/20 0358)     PRN Meds: sodium phosphate IVPB **OR** sodium phosphate IVPB, sodium chloride flush, promethazine **OR** ondansetron, polyethylene glycol, potassium chloride **OR** potassium alternative oral replacement **OR** potassium chloride, LORazepam **OR** LORazepam **OR** LORazepam **OR** LORazepam **OR** LORazepam **OR** LORazepam **OR** LORazepam **OR** LORazepam, labetalol    Data:     Past Medical History:   has a past medical history of Alcohol abuse, Arthritis, Back pain, chronic, BPH (benign prostatic hypertrophy), Cholelithiasis, Cirrhosis ([facility] [address] DDD (degenerative disc disease), lumbar, Fatty liver, GERD (gastroesophageal reflux disease), Hep C w/o coma, chronic ([facility] [address] History of colon polyps, [place] (hard of hearing), Hyperlipidemia, Hypertension, Stomach ulcer, Tobacco abuse, Tubular adenoma of colon, and Wears glasses. Social History:   reports that he quit smoking about 3 years ago. He has a 45.00 pack-year smoking history. He has never used smokeless tobacco. He reports that he does not drink alcohol or use drugs. Family History:   Family History   Problem Relation Age of Onset   [de-identified] Cancer Mother         pancreatic    Cancer Father         bone    Diabetes Sister     Asthma Brother        Vitals:  /82   Pulse 63   Temp 99.5 °F (37.5 °C) (Axillary)   Resp 18   Ht 5' 10\" (1.778 m)   Wt 236 lb 15.9 oz (107.5 kg)   SpO2 96%   BMI 34.01 kg/m²   Temp (24hrs), Av °F (36.7 °C), Min:96.5 °F (35.8 °C), Max:99.5 °F (37.5 °C)    No results for input(s): POCGLU in the last 72 hours. I/O(24Hr):     Intake/Output Summary (Last 24 hours) at 2020 0900  Last data filed at 2020 0559  Gross per 24 hour   Intake 1955.44 ml   Output 1250 ml   Net 705.44 ml       Labs:    [unfilled]    Lab Results   Component Value Date/Time    SPECIAL 4 ML PURPLE RT AC, 1 ML RED [facility] 2020 09:54 AM     Lab Results   Component Value Date/Time    CULTURE NO GROWTH 1 DAY 2020 09:54 AM       [unfilled]    Radiology:    Us Liver    Result Date: 2020  EXAMINATION: ULTRASOUND OF THE LIVER 2020 10:47 am COMPARISON: 2018 HISTORY: [facility] HISTORY: elevated lft hx hep c TECHNOLOGIST PROVIDED HISTORY: elevated lft hx hep c Acuity: Acute Type of Exam: Initial FINDINGS: Pancreas is not visualized. Increased heterogeneous liver echotexture with mild nodular contour of the liver. Portal vein is patent with hepatopetal flow. No right-sided hydronephrosis. Cholelithiasis and gallbladder sludge. No gallbladder wall thickening. No pericholecystic fluid. Normal common bile duct measuring 4 mm. Hepatic cirrhosis. Cholelithiasis and gallbladder sludge without sonographic findings to suggest acute cholecystitis. Xr Chest Portable    Result Date: 12/24/2020  EXAMINATION: ONE XRAY VIEW OF THE CHEST 12/24/2020 2:50 pm COMPARISON: None. HISTORY: ORDERING SYSTEM PROVIDED HISTORY: AMS TECHNOLOGIST PROVIDED HISTORY: AMS Reason for Exam: AMS Acuity: Acute Type of Exam: Initial FINDINGS: A single frontal view of the chest was obtained. There is no acute skeletal abnormality. The lung volumes are low. The heart size is enlarged. The mediastinal contours are accentuated by low lung volumes, and are felt to be within normal limits. There are scattered calcified granulomata. There is pulmonary vascular congestion, without overt edema. No focus of acute airspace consolidation is identified. There is no evidence of a pneumothorax. 1. Low lung volumes, without acute airspace consolidation. 2. Pulmonary vascular congestion, without overt edema. 3. Cardiomegaly. Fluoro For Surgical Procedures    Result Date: 12/7/2020  Radiology exam is complete. No Radiologist dictation. Please follow up with ordering provider. Physical Examination:        Physical Exam  Constitutional:       General: He is not in acute distress. Appearance: Normal appearance. He is not ill-appearing. HENT:      Head: Normocephalic. Mouth/Throat:      Mouth: Mucous membranes are moist.   Cardiovascular:      Rate and Rhythm: Normal rate and regular rhythm. Pulses: Normal pulses. Heart sounds: Normal heart sounds. No murmur. No gallop. Pulmonary:      Effort: Pulmonary effort is normal. No respiratory distress. Breath sounds: Normal breath sounds. No wheezing. Comments: On 2 L oxygen via nasal cannula  Chest:      Chest wall: No tenderness. Abdominal:      General: Bowel sounds are normal. There is no distension. Palpations: Abdomen is soft. There is no mass. Tenderness: There is no abdominal tenderness. Musculoskeletal:         General: No swelling, tenderness or deformity. Neurological:      Comments: Disoriented with incoherent speech   Psychiatric:         Mood and Affect: Mood normal.         Behavior: Behavior normal.         Thought Content:  Thought content normal.           Assessment:        Primary Problem  Acute gastrointestinal bleeding    Active Hospital Problems    Diagnosis Date Noted    Gastrointestinal hemorrhage with melena [K92.1]     Alcohol abuse [F10.10]     Altered mental status [R41.82]     Acute gastrointestinal bleeding [K92.2] 12/23/2020    Thrombocytopenia ([facility] [address] [D69.6] 12/23/2020    Hepatitis C virus infection resolved after antiviral drug therapy [Z86.19] 12/23/2020    Elevated LFTs [R79.89] 08/12/2020    Essential hypertension [I10] 04/24/2019    Hypokalemia [E87.6] 02/04/2019    Cirrhosis ([facility] [address] [K74.60]     Hypomagnesemia [E83.42]     DTs (delirium tremens) ([facility] [address] [F10.231] 07/20/2016    GERD (gastroesophageal reflux disease) [K21.9] 04/19/2012       Plan:           Acute GI bleed likely secondary to esophageal varices  -Hemoglobin 10.7>11.7, no evidence of further bleed  -Platelets 88>89>11  -Monitor platelets and H&H daily, hemoglobin 11.1  -Patient has history of liver cirrhosis  -GI consulted, no EGD today due to altered mental status  -Octreotide drip 50 MCG per hour, pantoprazole drip 80 mg/h  -INR 1.5, PT 18.3, PTT 37.9  -IV Rocephin for SBP prophylaxis     Delirium tremens  -CORKYWA

## 2020-12-26 NOTE — PROGRESS NOTES
Physical Therapy  DATE: 2020    NAME: Kaykay Figueroa  MRN: [id number]   : 1960    Patient not seen this date for Physical Therapy due to:  [] Blood transfusion in progress  [] Hemodialysis  [] Patient Declined  [] Spine Precautions   [] Strict Bedrest  [] Surgery/ Procedure  [] Testing      [x] Other- cx per RN due to pt in DTs        [] PT is being discontinued at this time. Patient independent. No further needs. [] PT is being discontinued at this time due to declining physical/ medical status. Therapy is not appropriate at this time.     Meldon Mix, PT

## 2020-12-26 NOTE — CARE COORDINATION
ONGOING DISCHARGE PLAN:    Plan remains for LSW to follow for possible SNF. Per Notes, Pt. Has been to [address] in past.     Pt. Remains Confused & In Restraints. Per Nursing, Attempting to wean Precedex GTT. Remains on IV Rocephin. HGB today 11.5. Pt. On PO Libruim & CIWA Scale continues. PT/OT on board. Will continue to follow for additional discharge needs.     Electronically signed by Kassi Eaton RN on 12/26/2020 at 4:20 PM

## 2020-12-26 NOTE — PROGRESS NOTES
RN called to update resident's regarding trying a swallow study with the patient. Patient is not alert enough to do the swallow study. RN will try again before she leaves. If not residents would like night shift to try again.

## 2020-12-26 NOTE — PROGRESS NOTES
[address]   OCCUPATIONAL THERAPY MISSED TREATMENT NOTE   INPATIENT   Date: 20  Patient Name: Jo Ma       Room: 4433/1539-78  MRN: [id number]   Account #: [de-identified]    : 1960  (61 y.o.)  Gender: male                 REASON FOR MISSED TREATMENT:  Hold treatment per nursing request   -   Other - Per RN Chayito Patrick, patient not appropriate for OT Eval this date. Will continue to follow and check back for OT needs as appropriate.          Nano Calvert, OT

## 2020-12-26 NOTE — PROGRESS NOTES
THE [facility] AT [facility]   Progress Note    Jo Ma is a 63 y.o. male patient. Hospitalization Day:3      Chief consult reason:   Cirrhosis  Hematemesis  Alcohol withdrawl    Subjective:  Pt seen and examined. Pt still in active DT's with sitter at bedside, in restraints and altered. Pt will awake to voice and follow limited commands. No rectal bleeding overnight or hematemesis  Pt is on Prece dex drip, PPI and Octreotide drips  Hgb stable 11.7. LFT's improving    VITALS:  /80   Pulse 76   Temp 99.2 °F (37.3 °C) (Axillary)   Resp 18   Ht 5' 10\" (1.778 m)   Wt 236 lb 15.9 oz (107.5 kg)   SpO2 97%   BMI 34.01 kg/m²   TEMPERATURE:  Current - Temp: 99.2 °F (37.3 °C); Max - Temp  Av.3 °F (36.8 °C)  Min: 96.5 °F (35.8 °C)  Max: 99.5 °F (37.5 °C)    Physical Assessment:  General appearance: Altered, uncooperative, in restraints  Mental Status:  oriented to name, place  Lungs:  DM in bases  Heart:  regular rate and rhythm, no murmur  Abdomen:  soft, nontender, nondistended, normal bowel sounds, no masses, hepatomegaly, splenomegaly  Extremities:  no edema, redness, tenderness in the calves  Skin:  no gross lesions, rashes, induration    Data Review:    Labs and Imaging:     CBC:  Recent Labs     20  0923 20  0923 20  0442 20  1249 20  2100 20  0512 20  1029   WBC 5.5  --  3.7  --   --   --  3.7   HGB 11.1*   < > 11.1*  11.0* 12.0* 12.0* 11.7* 11.5*   MCV 91.9  --  94.0  --   --   --  92.6   RDW 15.2*  --  15.3*  --   --   --  15.1*   PLT 73*  --  64*  --   --   --  66*    < > = values in this interval not displayed.        ANEMIA STUDIES:  Recent Labs     20  17120   LABIRON  --   --  32   TIBC  --   --  180*   FERRITIN  --  187  --    [id number] 1321*  --   --    FOLATE 6.3  --   --        BMP:  Recent Labs     20  1029    142 142   K 3.7 3.9 3.5*    108* 107   CO2 20 23 22   BUN 12 14 12   CREATININE 0.52* 0.55* 0.66*   GLUCOSE 98 103* 157*   CALCIUM 8.1* 7.6* 7.5*       LFTS:  Recent Labs     12/24/20  0923 12/25/20  0442 12/26/20  0512   ALKPHOS 163* 146* 141*   ALT 25 24 28   * 111* 121*   BILITOT 4.69* 3.24* 2.96*   BILIDIR 2.88* 2.06* 1.77*   LABALBU 2.6* 2.4* 2.6*       Amylase/Lipase and Ammonia:  Recent Labs     12/24/20  0954   AMMONIA 40       Acute Hepatitis Panel:  Lab Results   Component Value Date    HEPBSAG NONREACTIVE 12/23/2020    HEPCAB REACTIVE 12/23/2020    HEPBIGM NONREACTIVE 12/23/2020    HEPAIGM NONREACTIVE 12/23/2020       HCV Genotype:  Lab Results   Component Value Date    HEPATITISCGENOTYPE Mixed 09/06/2016       HCV Quantitative:  Lab Results   Component Value Date    HCVQNT NOT REPORTED 09/06/2016       LIVER WORK UP:    AFP  Lab Results   Component Value Date    AFP 3.4 12/23/2020       Alpha 1 antitrypsin   Lab Results   Component Value Date    A1A 178 07/21/2016       MARYSOL  Lab Results   Component Value Date    MARYSOL NEGATIVE 07/21/2016       AMA  Lab Results   Component Value Date    MITOAB 2.6 07/21/2016       ASMA  No results found for: SMOOTHMUSCAB    PT/INR  Recent Labs     12/25/20  0943   PROTIME 17.5*   INR 1.4       Cancer Markers:  CEA:  No results for input(s): CEA in the last 72 hours. Ca 125:  No results for input(s):  in the last 72 hours. Ca 19-9:   Invalid input(s):   AFP:   Recent Labs     12/23/20  1709   AFP 3.4     Lactic acid:Invalid input(s): LACTIC ACID    Radiology Review:    Us Liver    Result Date: 12/24/2020  EXAMINATION: ULTRASOUND OF THE LIVER 12/24/2020 10:47 am COMPARISON: February 26, 2018 HISTORY: ORDERING SYSTEM PROVIDED HISTORY: elevated lft hx hep c TECHNOLOGIST PROVIDED HISTORY: elevated lft hx hep c Acuity: Acute Type of Exam: Initial FINDINGS: Pancreas is not visualized. Increased heterogeneous liver echotexture with mild nodular contour of the liver.   Portal vein is patent with hepatopetal flow. No right-sided hydronephrosis. Cholelithiasis and gallbladder sludge. No gallbladder wall thickening. No pericholecystic fluid. Normal common bile duct measuring 4 mm. Hepatic cirrhosis. Cholelithiasis and gallbladder sludge without sonographic findings to suggest acute cholecystitis. Principal Problem:    Acute gastrointestinal bleeding  Active Problems:    GERD (gastroesophageal reflux disease)    DTs (delirium tremens) (HCC)    Hypomagnesemia    Cirrhosis (HCC)    Hypokalemia    Essential hypertension    Elevated LFTs    Thrombocytopenia (HCC)    Hepatitis C virus infection resolved after antiviral drug therapy    Gastrointestinal hemorrhage with melena    Alcohol abuse    Altered mental status    Hypocalcemia    Hypophosphatemia    UTI (urinary tract infection)  Resolved Problems:    * No resolved hospital problems. *       GI Impression:    1. Alcoholic/Hep C Cirrhosis  2. Alcohol misuse disorder  3. Alcohol with drawls  4. Hematemesis-resolved  5. Melena-resolved  6. HX treated Hep C      Plan and Recommendations:    1. Cont with PPI and Octreotide drips  2. If pt can tolerate, may have clear diet and nutritional supplements QID  3. Daily Labs including CBC, BMP, LFT's INR  4. Once pt is stable and not inactive withdrawal's, we will consider endoscopy  5. Lactulose enemas as ordered. 6. If pt becomes more awake, may start Lactulose 30 g TID-titrate to 3-4 bm's daily. Then DC enemas  7. Strict I/O's  8. Cont Aldactone 50 mg po daily   9. Will follow-please call with any questions or concerns      This plan was formulated in collaboration with Dr. Ravindra Zhou MD    Thank you for allowing me to participate in the care of your patient. Please feel free to contact me with any questions or concerns.      [facility]  [phone]    Attending Physician Statement  I have discussed the care of Caitlyn Slater and   I have examined the patient myselft independently, and taken ros and hpi , including pertinent history and exam findings,  with the author of this note . I have reviewed the key elements of all parts of the encounter with the nurse practitioner/resident.     I agree with the assessment, plan and orders as documented by the above health care provider       Mental status is better  Still not able to eat and drink well  We will waiting until this is happening so we can start lactulose and Xifaxan  No sign of bleeding at this time  Electronically signed by Kirit Plascencia MD

## 2020-12-27 LAB
ALBUMIN SERPL-MCNC: 2.3 G/DL (ref 3.5–5.2)
ALBUMIN/GLOBULIN RATIO: ABNORMAL (ref 1–2.5)
ALP BLD-CCNC: 127 U/L (ref 40–129)
ALT SERPL-CCNC: 27 U/L (ref 5–41)
ANION GAP SERPL CALCULATED.3IONS-SCNC: 9 MMOL/L (ref 9–17)
AST SERPL-CCNC: 96 U/L
BILIRUB SERPL-MCNC: 2.81 MG/DL (ref 0.3–1.2)
BILIRUBIN DIRECT: 1.71 MG/DL
BILIRUBIN, INDIRECT: 1.1 MG/DL (ref 0–1)
BUN BLDV-MCNC: 10 MG/DL (ref 8–23)
BUN/CREAT BLD: ABNORMAL (ref 9–20)
CALCIUM SERPL-MCNC: 7.4 MG/DL (ref 8.6–10.4)
CHLORIDE BLD-SCNC: 110 MMOL/L (ref 98–107)
CO2: 24 MMOL/L (ref 20–31)
CREAT SERPL-MCNC: 0.52 MG/DL (ref 0.7–1.2)
GFR AFRICAN AMERICAN: >60 ML/MIN
GFR NON-AFRICAN AMERICAN: >60 ML/MIN
GFR SERPL CREATININE-BSD FRML MDRD: ABNORMAL ML/MIN/{1.73_M2}
GFR SERPL CREATININE-BSD FRML MDRD: ABNORMAL ML/MIN/{1.73_M2}
GLOBULIN: ABNORMAL G/DL (ref 1.5–3.8)
GLUCOSE BLD-MCNC: 118 MG/DL (ref 70–99)
HCT VFR BLD CALC: 36 % (ref 41–53)
HEMOGLOBIN: 11.5 G/DL (ref 13.5–17.5)
MAGNESIUM: 1.9 MG/DL (ref 1.6–2.6)
MCH RBC QN AUTO: 30.2 PG (ref 26–34)
MCHC RBC AUTO-ENTMCNC: 32 G/DL (ref 31–37)
MCV RBC AUTO: 94.5 FL (ref 80–100)
NRBC AUTOMATED: ABNORMAL PER 100 WBC
PDW BLD-RTO: 15.5 % (ref 11.5–14.9)
PLATELET # BLD: 68 K/UL (ref 150–450)
PMV BLD AUTO: 8.7 FL (ref 6–12)
POTASSIUM SERPL-SCNC: 3.1 MMOL/L (ref 3.7–5.3)
RBC # BLD: 3.81 M/UL (ref 4.5–5.9)
SODIUM BLD-SCNC: 143 MMOL/L (ref 135–144)
TOTAL PROTEIN: 5.5 G/DL (ref 6.4–8.3)
WBC # BLD: 4.1 K/UL (ref 3.5–11)

## 2020-12-27 PROCEDURE — 6360000002 HC RX W HCPCS: Performed by: STUDENT IN AN ORGANIZED HEALTH CARE EDUCATION/TRAINING PROGRAM

## 2020-12-27 PROCEDURE — 80048 BASIC METABOLIC PNL TOTAL CA: CPT

## 2020-12-27 PROCEDURE — 2700000000 HC OXYGEN THERAPY PER DAY

## 2020-12-27 PROCEDURE — 83735 ASSAY OF MAGNESIUM: CPT

## 2020-12-27 PROCEDURE — 2060000000 HC ICU INTERMEDIATE R&B

## 2020-12-27 PROCEDURE — 85027 COMPLETE CBC AUTOMATED: CPT

## 2020-12-27 PROCEDURE — 2580000003 HC RX 258: Performed by: INTERNAL MEDICINE

## 2020-12-27 PROCEDURE — 94761 N-INVAS EAR/PLS OXIMETRY MLT: CPT

## 2020-12-27 PROCEDURE — 2580000003 HC RX 258: Performed by: STUDENT IN AN ORGANIZED HEALTH CARE EDUCATION/TRAINING PROGRAM

## 2020-12-27 PROCEDURE — 6370000000 HC RX 637 (ALT 250 FOR IP): Performed by: INTERNAL MEDICINE

## 2020-12-27 PROCEDURE — 6370000000 HC RX 637 (ALT 250 FOR IP): Performed by: STUDENT IN AN ORGANIZED HEALTH CARE EDUCATION/TRAINING PROGRAM

## 2020-12-27 PROCEDURE — 99232 SBSQ HOSP IP/OBS MODERATE 35: CPT | Performed by: INTERNAL MEDICINE

## 2020-12-27 PROCEDURE — 36415 COLL VENOUS BLD VENIPUNCTURE: CPT

## 2020-12-27 PROCEDURE — 99233 SBSQ HOSP IP/OBS HIGH 50: CPT | Performed by: INTERNAL MEDICINE

## 2020-12-27 PROCEDURE — 6370000000 HC RX 637 (ALT 250 FOR IP): Performed by: NURSE PRACTITIONER

## 2020-12-27 PROCEDURE — 80076 HEPATIC FUNCTION PANEL: CPT

## 2020-12-27 PROCEDURE — 6360000002 HC RX W HCPCS: Performed by: INTERNAL MEDICINE

## 2020-12-27 RX ORDER — ACETAMINOPHEN 500 MG
500 TABLET ORAL EVERY 6 HOURS PRN
Status: DISCONTINUED | OUTPATIENT
Start: 2020-12-27 | End: 2020-12-31 | Stop reason: HOSPADM

## 2020-12-27 RX ORDER — PANTOPRAZOLE SODIUM 40 MG/1
40 TABLET, DELAYED RELEASE ORAL
Status: DISCONTINUED | OUTPATIENT
Start: 2020-12-27 | End: 2020-12-31 | Stop reason: HOSPADM

## 2020-12-27 RX ORDER — LACTULOSE 10 G/15ML
20 SOLUTION ORAL 3 TIMES DAILY
Status: DISCONTINUED | OUTPATIENT
Start: 2020-12-27 | End: 2020-12-30

## 2020-12-27 RX ADMIN — PANTOPRAZOLE SODIUM 40 MG: 40 TABLET, DELAYED RELEASE ORAL at 11:18

## 2020-12-27 RX ADMIN — TAMSULOSIN HYDROCHLORIDE 0.4 MG: 0.4 CAPSULE ORAL at 09:09

## 2020-12-27 RX ADMIN — CHLORDIAZEPOXIDE HYDROCHLORIDE 25 MG: 25 CAPSULE ORAL at 09:10

## 2020-12-27 RX ADMIN — SODIUM CHLORIDE: 9 INJECTION, SOLUTION INTRAVENOUS at 23:24

## 2020-12-27 RX ADMIN — CHLORDIAZEPOXIDE HYDROCHLORIDE 25 MG: 25 CAPSULE ORAL at 20:30

## 2020-12-27 RX ADMIN — RIFAXIMIN 550 MG: 550 TABLET ORAL at 20:31

## 2020-12-27 RX ADMIN — LACTULOSE 20 G: 20 SOLUTION ORAL at 11:18

## 2020-12-27 RX ADMIN — CHLORDIAZEPOXIDE HYDROCHLORIDE 25 MG: 25 CAPSULE ORAL at 12:53

## 2020-12-27 RX ADMIN — LORAZEPAM 1 MG: 1 TABLET ORAL at 07:15

## 2020-12-27 RX ADMIN — POTASSIUM CHLORIDE 40 MEQ: 1500 TABLET, EXTENDED RELEASE ORAL at 09:09

## 2020-12-27 RX ADMIN — SPIRONOLACTONE 50 MG: 25 TABLET, FILM COATED ORAL at 09:10

## 2020-12-27 RX ADMIN — CHLORDIAZEPOXIDE HYDROCHLORIDE 25 MG: 25 CAPSULE ORAL at 15:58

## 2020-12-27 RX ADMIN — CALCIUM GLUCONATE 2 G: 98 INJECTION, SOLUTION INTRAVENOUS at 10:26

## 2020-12-27 RX ADMIN — SODIUM CHLORIDE: 9 INJECTION, SOLUTION INTRAVENOUS at 09:11

## 2020-12-27 RX ADMIN — CEFTRIAXONE SODIUM 1 G: 1 INJECTION, POWDER, FOR SOLUTION INTRAMUSCULAR; INTRAVENOUS at 15:44

## 2020-12-27 RX ADMIN — ACETAMINOPHEN 500 MG: 500 TABLET, FILM COATED ORAL at 21:27

## 2020-12-27 RX ADMIN — RIFAXIMIN 550 MG: 550 TABLET ORAL at 15:58

## 2020-12-27 RX ADMIN — DIBASIC SODIUM PHOSPHATE, MONOBASIC POTASSIUM PHOSPHATE AND MONOBASIC SODIUM PHOSPHATE 1 TABLET: 852; 155; 130 TABLET ORAL at 11:18

## 2020-12-27 RX ADMIN — DIBASIC SODIUM PHOSPHATE, MONOBASIC POTASSIUM PHOSPHATE AND MONOBASIC SODIUM PHOSPHATE 1 TABLET: 852; 155; 130 TABLET ORAL at 20:30

## 2020-12-27 ASSESSMENT — ENCOUNTER SYMPTOMS
WHEEZING: 0
SHORTNESS OF BREATH: 0
DIARRHEA: 0
VOMITING: 0
CHEST TIGHTNESS: 0
NAUSEA: 0
COUGH: 0
ABDOMINAL DISTENTION: 0
ABDOMINAL PAIN: 0

## 2020-12-27 ASSESSMENT — PAIN SCALES - GENERAL: PAINLEVEL_OUTOF10: 3

## 2020-12-27 NOTE — CARE COORDINATION
ONGOING DISCHARGE PLAN:    Plan remains for LSW to follow for possible SNF.     Per Notes, Pt. Has been to [address] in past.     Pt. Is out of restraints today & Per Nursing Mentation is improving. PT/OT on board, will follow rec. Pt. Remains on IV Rocephin. HGB today 11.5. PO Lactulose & Xifaxan started  today. Per GI notes, EGD itzel. Will continue to follow for additional discharge needs.     Electronically signed by Malathi Meza RN on 12/27/2020 at 3:45 PM

## 2020-12-27 NOTE — PLAN OF CARE
Problem: Falls - Risk of:  Goal: Will remain free from falls  Outcome: Ongoing  Goal: Absence of physical injury  Outcome: Ongoing     Problem: Restraint Use - Nonviolent/Non-Self-Destructive Behavior:  Goal: Absence of restraint indications  Outcome: Ongoing  Goal: Absence of restraint-related injury  Outcome: Ongoing     Problem: Skin Integrity:  Goal: Will show no infection signs and symptoms  Outcome: Ongoing  Goal: Absence of new skin breakdown  Outcome: Ongoing

## 2020-12-27 NOTE — PROGRESS NOTES
Patient able to complete bedside swallow study. Patient passed.      Electronically signed by Dunia Snider RN on 12/26/2020 at 11:39 PM

## 2020-12-27 NOTE — PROGRESS NOTES
[address] [facility].    PROGRESS NOTE             12/27/2020    9:55 AM    Name:   Jo Ma  MRN:     [id number]     Acct:      [de-identified]   Room:   212/[facility] Day:  4  Admit Date:  12/23/2020 10:57 AM    PCP:  MASSIMO Villa CNP  Code Status:  Full Code    Subjective:     C/C:   Chief Complaint   Patient presents with    Hematemesis    Weight Loss     Interval History Status: significantly improved. Patient was seen and examined bedside. No acute events overnight. Patient's blood pressure and heart rate slightly on the lower side, /55, HR 49 last night. This morning /66, HR 57.     Last night patient passed bedside swallow study. Patient is AAO x3, out of restraints, and Precedex has been stopped. Patient receiving Librium 1 g every 24 and had 1 dose of Ativan 1 mg p.o. this morning. Octeotride and Protonix d/c yesterday by GI. Rocephin day 4. GI consulted, EGD when patient stable. Xifaxan 550 mg tid added for encephalopathy with oral lactulose 20 g tid, lactulose enemas d/c. Transfer to PCU     Brief History:     The patient is a 60 y.o.  Non-/non  male who presents withHematemesis and Weight Loss   and he is admitted to the hospital for the management of GI bleed. Patient has a past medical history of chronic hepatitis C treated with Harvani, hepatic cirrhosis with ascites, GERD, essential hypertension presented to the ER with complaint of nausea and vomiting for 3 days. Patient states that his vomitus contains bright red blood. Patient is also complaining of black stools.  He reports that he was drinking heavily on Friday and woke up the next day with abdominal cramping and projectile vomiting.  Patient says that he vomited 7 times since Sunday and has not been able to  eat or drink since his symptoms started and has lost 6 pounds in 1 week.  Patient denies any vomiting today and states that he only has dry heaves. On exam, patient has fine tremors with positive finger-to-nose test with impaired coordination and unsteady gait. His last drink was on Friday, ethanol levels less than 10 in ER. Patient drinks 6-8 beers on the weekend or \"enough until he blacks out\".  Patient reportedly quit drinking many years back, recently resumed drinking on weekends. According to charts patient had IR guided paracentesis 2016 due to ascites with successful removal of 7 L of clear fluid. Patient has not had paracentesis since then.  CT abdomen pelvis done in 2016 showed splenic artery aneurysm, cholelithiasis with CT evidence of cholecystitis. Patient denies surgical history of cholecystectomy.     In the ER FOBT positive, hemoglobin 12.4, potassium 3.6.  Liver enzymes elevated , AST 28, alkaline phosphatase 201.  Albumin 2.9     Patient put on CIWA     Review of Systems:     Review of Systems   Constitutional: Negative for activity change, appetite change, chills and fever. HENT: Negative for congestion. Respiratory: Negative for cough, chest tightness, shortness of breath and wheezing. Cardiovascular: Negative for chest pain and leg swelling. Gastrointestinal: Negative for abdominal distention, abdominal pain, diarrhea, nausea and vomiting. Genitourinary: Negative for dysuria and flank pain. Skin: Negative for rash. Neurological: Positive for tremors and speech difficulty. Negative for dizziness, weakness, numbness and headaches. Psychiatric/Behavioral: Negative for agitation, behavioral problems, confusion and sleep disturbance. Medications:      Allergies:  No Known Allergies    Current Meds:   Scheduled Meds:    calcium gluconate IVPB  2 g Intravenous Once    chlordiazePOXIDE  25 mg Oral 4x Daily    tamsulosin  0.4 mg Oral Daily    vitamin D  50,000 Units Oral Weekly    phosphorus  250 mg Oral BID    lactulose enema   Rectal BID    cefTRIAXone (ROCEPHIN) IV  1 g Intravenous Q24H    [Held by provider] atorvastatin  20 mg Oral Nightly    spironolactone  50 mg Oral Daily    sodium chloride flush  10 mL Intravenous 2 times per day    influenza virus vaccine  0.5 mL Intramuscular Prior to discharge     Continuous Infusions:    sodium chloride 100 mL/hr at 20 0911    dexmedetomidine Stopped (20 1629)     PRN Meds: sodium phosphate IVPB **OR** sodium phosphate IVPB, LORazepam, sodium chloride flush, promethazine **OR** ondansetron, polyethylene glycol, potassium chloride **OR** potassium alternative oral replacement **OR** potassium chloride, LORazepam **OR** LORazepam **OR** LORazepam **OR** LORazepam **OR** LORazepam **OR** LORazepam **OR** LORazepam **OR** LORazepam, labetalol    Data:     Past Medical History:   has a past medical history of Alcohol abuse, Arthritis, Back pain, chronic, BPH (benign prostatic hypertrophy), Cholelithiasis, Cirrhosis ([address] DDD (degenerative disc disease), lumbar, Fatty liver, GERD (gastroesophageal reflux disease), Hep C w/o coma, chronic ([address] History of colon polyps, [place] (hard of hearing), Hyperlipidemia, Hypertension, Stomach ulcer, Tobacco abuse, Tubular adenoma of colon, and Wears glasses. Social History:   reports that he quit smoking about 3 years ago. He has a 45.00 pack-year smoking history. He has never used smokeless tobacco. He reports that he does not drink alcohol or use drugs. Family History:   Family History   Problem Relation Age of Onset   [facility] Cancer Mother         pancreatic    Cancer Father         bone    Diabetes Sister     Asthma Brother        Vitals:  /66   Pulse 57   Temp 98.1 °F (36.7 °C) (Axillary)   Resp 18   Ht 5' 10\" (1.778 m)   Wt 233 lb 0.4 oz (105.7 kg)   SpO2 100%   BMI 33.44 kg/m²   Temp (24hrs), Av °F (36.7 °C), Min:96 °F (35.6 °C), Max:99.2 °F (37.3 °C)    No results for input(s): POCGLU in the last 72 hours. I/O(24Hr):     Intake/Output Summary (Last 24 hours) at 12/27/2020 0955  Last data filed at 12/27/2020 0458  Gross per 24 hour   Intake 660 ml   Output 3025 ml   Net -2365 ml       Labs:    [unfilled]    Lab Results   Component Value Date/Time    SPECIAL 4 ML PURPLE RT AC, [facility] ML RED [facility] 12/24/2020 09:54 AM     Lab Results   Component Value Date/Time    CULTURE NO GROWTH 2 DAYS 12/24/2020 09:54 AM       [facility]    Radiology:    Us Liver    Result Date: 12/24/2020  EXAMINATION: ULTRASOUND OF THE LIVER 12/24/2020 10:47 am COMPARISON: February 26, 2018 HISTORY: ORDERING SYSTEM PROVIDED HISTORY: elevated lft hx hep c TECHNOLOGIST PROVIDED HISTORY: elevated lft hx hep c Acuity: Acute Type of Exam: Initial FINDINGS: Pancreas is not visualized. Increased heterogeneous liver echotexture with mild nodular contour of the liver. Portal vein is patent with hepatopetal flow. No right-sided hydronephrosis. Cholelithiasis and gallbladder sludge. No gallbladder wall thickening. No pericholecystic fluid. Normal common bile duct measuring 4 mm. Hepatic cirrhosis. Cholelithiasis and gallbladder sludge without sonographic findings to suggest acute cholecystitis. Xr Chest Portable    Result Date: 12/24/2020  EXAMINATION: ONE XRAY VIEW OF THE CHEST 12/24/2020 2:50 pm COMPARISON: None. HISTORY: ORDERING SYSTEM PROVIDED HISTORY: AMS TECHNOLOGIST PROVIDED HISTORY: AMS Reason for Exam: AMS Acuity: Acute Type of Exam: Initial FINDINGS: A single frontal view of the chest was obtained. There is no acute skeletal abnormality. The lung volumes are low. The heart size is enlarged. The mediastinal contours are accentuated by low lung volumes, and are felt to be within normal limits. There are scattered calcified granulomata. There is pulmonary vascular congestion, without overt edema. No focus of acute airspace consolidation is identified. There is no evidence of a pneumothorax. 1. Low lung volumes, without acute airspace consolidation.  2. Pulmonary vascular congestion, without overt edema. 3. Cardiomegaly. Fluoro For Surgical Procedures    Result Date: 12/7/2020  Radiology exam is complete. No Radiologist dictation. Please follow up with ordering provider. Physical Examination:        Physical Exam  Constitutional:       General: He is not in acute distress. Appearance: Normal appearance. He is not ill-appearing. HENT:      Head: Normocephalic. Mouth/Throat:      Mouth: Mucous membranes are moist.   Cardiovascular:      Rate and Rhythm: Normal rate and regular rhythm. Pulses: Normal pulses. Heart sounds: Normal heart sounds. No murmur. No gallop. Pulmonary:      Effort: Pulmonary effort is normal. No respiratory distress. Breath sounds: Normal breath sounds. No wheezing. Chest:      Chest wall: No tenderness. Abdominal:      General: Bowel sounds are normal. There is no distension. Palpations: Abdomen is soft. There is no mass. Tenderness: There is no abdominal tenderness. Musculoskeletal:         General: No swelling, tenderness or deformity. Neurological:      General: No focal deficit present. Mental Status: He is alert and oriented to person, place, and time. Psychiatric:         Mood and Affect: Mood normal.         Behavior: Behavior normal.         Thought Content:  Thought content normal.           Assessment:        Primary Problem  Acute gastrointestinal bleeding    Active Hospital Problems    Diagnosis Date Noted    Hypocalcemia [E83.51] 12/26/2020    Hypophosphatemia [E83.39] 12/26/2020    UTI (urinary tract infection) [N39.0] 12/26/2020    Gastrointestinal hemorrhage with melena [K92.1]     Alcohol abuse [F10.10]     Altered mental status [R41.82]     Acute gastrointestinal bleeding [K92.2] 12/23/2020    Thrombocytopenia ([facility] [D69.6] 12/23/2020    Hepatitis C virus infection resolved after antiviral drug therapy [Z86.19] 12/23/2020    Elevated LFTs [R79.89] 08/12/2020    Essential hypertension [I10] 04/24/2019    Hypokalemia [E87.6] 02/04/2019    Cirrhosis ([facility] [K74.60]     Hypomagnesemia [E83.42]     DTs (delirium tremens) ([facility] [F10.231] 07/20/2016    GERD (gastroesophageal reflux disease) [K21.9] 04/19/2012       Plan:        Acute GI bleed likely secondary to esophageal varices  -Hemoglobin 10.7>11.7>11.5, no evidence of further bleed  -Platelets 33>44>25>95  -Monitor platelets and H&H daily  -Patient has history of liver cirrhosis  -GI consulted, EGD likely Monday   -Octreotide and Protonix drip d/c   -INR 1.5, PT 18.3, PTT 37.9  -IV Rocephin for SBP prophylaxis     Delirium tremens-resolving  -CIWA protocol  -Patient off Precedex drip, on Librium 25 mg qid  -Ammonia 40  -UDS negative   -History of alcohol abuse, last drink reported to be Friday  -Blood culture showed no growth  -Xifaxan 550 tid for encephalopathy      Liver cirrhosis 2/2 hepatitis C  -, ALT 28, alk phos 201, bilirubin 5.57 on presentation  -AST 96, ALT 27, alk phos 127, bilirubin 2.81-improving  -History of hep C, positive hepatitis C antibody, treated with Harvoni  -Ultrasound on February 2018 significant for findings of cirrhosis, no focal liver lesion identified  -Ultrasound liver hepatic cirrhosis with cholelithiasis and gallbladder sludge, no evidence of acute cholecystitis  -AFP 3.4  -Aldactone 50 mg p.o. daily  -Lactulose 20 g tid.      Alcohol abuse  -Thiamine 100 mg p.o. daily   -Folic acid 1 mg p.o. daily  -Folic MDMZ 1.8 and C58 1321  -Fall precautions  -Seizure precautions  -CIWA scale     UTI  -UA positive for moderate bacteria, positive nitrite, trace leukocyte esterase  -Already on antibiotics     Hypokalemia-resolving  -Potassium 3.5  -Potassium placement protocol, oral K given this morning     Hypomagnesemia  -Magnesium 1.5>1.9  -Magnesium 2 g IV on 12/26  -Mag-Ox 400 mg p.o. when patient is able to tolerate oral     Hypocalcemia  -Calcium 7.4  -Calcium 2 g IV given   -Patient on vitamin D 50,000 IU         DVT prophylaxis: EPC cuffs  Dispo: Logan Schwartz MD  12/27/2020  9:55 AM

## 2020-12-28 LAB
ABSOLUTE EOS #: 0 K/UL (ref 0–0.4)
ABSOLUTE IMMATURE GRANULOCYTE: ABNORMAL K/UL (ref 0–0.3)
ABSOLUTE LYMPH #: 0.5 K/UL (ref 1–4.8)
ABSOLUTE MONO #: 0.6 K/UL (ref 0.1–1.3)
ALBUMIN SERPL-MCNC: 2.1 G/DL (ref 3.5–5.2)
ALBUMIN/GLOBULIN RATIO: ABNORMAL (ref 1–2.5)
ALP BLD-CCNC: 115 U/L (ref 40–129)
ALT SERPL-CCNC: 25 U/L (ref 5–41)
ANION GAP SERPL CALCULATED.3IONS-SCNC: 10 MMOL/L (ref 9–17)
AST SERPL-CCNC: 88 U/L
BASOPHILS # BLD: 1 % (ref 0–2)
BASOPHILS ABSOLUTE: 0 K/UL (ref 0–0.2)
BILIRUB SERPL-MCNC: 2.45 MG/DL (ref 0.3–1.2)
BILIRUBIN DIRECT: 1.53 MG/DL
BILIRUBIN, INDIRECT: 0.92 MG/DL (ref 0–1)
BUN BLDV-MCNC: 8 MG/DL (ref 8–23)
BUN/CREAT BLD: ABNORMAL (ref 9–20)
CALCIUM SERPL-MCNC: 7.7 MG/DL (ref 8.6–10.4)
CHLORIDE BLD-SCNC: 104 MMOL/L (ref 98–107)
CO2: 24 MMOL/L (ref 20–31)
CREAT SERPL-MCNC: 0.5 MG/DL (ref 0.7–1.2)
DIFFERENTIAL TYPE: ABNORMAL
EOSINOPHILS RELATIVE PERCENT: 1 % (ref 0–4)
GFR AFRICAN AMERICAN: >60 ML/MIN
GFR NON-AFRICAN AMERICAN: >60 ML/MIN
GFR SERPL CREATININE-BSD FRML MDRD: ABNORMAL ML/MIN/{1.73_M2}
GFR SERPL CREATININE-BSD FRML MDRD: ABNORMAL ML/MIN/{1.73_M2}
GLOBULIN: ABNORMAL G/DL (ref 1.5–3.8)
GLUCOSE BLD-MCNC: 104 MG/DL (ref 70–99)
HCT VFR BLD CALC: 35.6 % (ref 41–53)
HEMOGLOBIN: 11.4 G/DL (ref 13.5–17.5)
IMMATURE GRANULOCYTES: ABNORMAL %
LYMPHOCYTES # BLD: 11 % (ref 24–44)
MCH RBC QN AUTO: 30.4 PG (ref 26–34)
MCHC RBC AUTO-ENTMCNC: 32 G/DL (ref 31–37)
MCV RBC AUTO: 94.9 FL (ref 80–100)
MONOCYTES # BLD: 15 % (ref 1–7)
NRBC AUTOMATED: ABNORMAL PER 100 WBC
PDW BLD-RTO: 16 % (ref 11.5–14.9)
PLATELET # BLD: 76 K/UL (ref 150–450)
PLATELET ESTIMATE: ABNORMAL
PMV BLD AUTO: 9.5 FL (ref 6–12)
POTASSIUM SERPL-SCNC: 3.8 MMOL/L (ref 3.7–5.3)
RBC # BLD: 3.75 M/UL (ref 4.5–5.9)
RBC # BLD: ABNORMAL 10*6/UL
SEG NEUTROPHILS: 72 % (ref 36–66)
SEGMENTED NEUTROPHILS ABSOLUTE COUNT: 3 K/UL (ref 1.3–9.1)
SODIUM BLD-SCNC: 138 MMOL/L (ref 135–144)
TOTAL PROTEIN: 5.1 G/DL (ref 6.4–8.3)
WBC # BLD: 4.2 K/UL (ref 3.5–11)
WBC # BLD: ABNORMAL 10*3/UL

## 2020-12-28 PROCEDURE — 80076 HEPATIC FUNCTION PANEL: CPT

## 2020-12-28 PROCEDURE — 2580000003 HC RX 258: Performed by: STUDENT IN AN ORGANIZED HEALTH CARE EDUCATION/TRAINING PROGRAM

## 2020-12-28 PROCEDURE — 97116 GAIT TRAINING THERAPY: CPT

## 2020-12-28 PROCEDURE — 36415 COLL VENOUS BLD VENIPUNCTURE: CPT

## 2020-12-28 PROCEDURE — 97162 PT EVAL MOD COMPLEX 30 MIN: CPT

## 2020-12-28 PROCEDURE — 6370000000 HC RX 637 (ALT 250 FOR IP): Performed by: STUDENT IN AN ORGANIZED HEALTH CARE EDUCATION/TRAINING PROGRAM

## 2020-12-28 PROCEDURE — 80048 BASIC METABOLIC PNL TOTAL CA: CPT

## 2020-12-28 PROCEDURE — 6370000000 HC RX 637 (ALT 250 FOR IP): Performed by: NURSE PRACTITIONER

## 2020-12-28 PROCEDURE — 85025 COMPLETE CBC W/AUTO DIFF WBC: CPT

## 2020-12-28 PROCEDURE — 1200000000 HC SEMI PRIVATE

## 2020-12-28 PROCEDURE — 6370000000 HC RX 637 (ALT 250 FOR IP): Performed by: INTERNAL MEDICINE

## 2020-12-28 PROCEDURE — 99232 SBSQ HOSP IP/OBS MODERATE 35: CPT | Performed by: INTERNAL MEDICINE

## 2020-12-28 PROCEDURE — 2060000000 HC ICU INTERMEDIATE R&B

## 2020-12-28 PROCEDURE — 99233 SBSQ HOSP IP/OBS HIGH 50: CPT | Performed by: INTERNAL MEDICINE

## 2020-12-28 PROCEDURE — 6360000002 HC RX W HCPCS: Performed by: STUDENT IN AN ORGANIZED HEALTH CARE EDUCATION/TRAINING PROGRAM

## 2020-12-28 RX ORDER — LORAZEPAM 2 MG/ML
1 INJECTION INTRAMUSCULAR
Status: DISCONTINUED | OUTPATIENT
Start: 2020-12-28 | End: 2020-12-30

## 2020-12-28 RX ADMIN — DIBASIC SODIUM PHOSPHATE, MONOBASIC POTASSIUM PHOSPHATE AND MONOBASIC SODIUM PHOSPHATE 1 TABLET: 852; 155; 130 TABLET ORAL at 21:16

## 2020-12-28 RX ADMIN — RIFAXIMIN 550 MG: 550 TABLET ORAL at 10:10

## 2020-12-28 RX ADMIN — CHLORDIAZEPOXIDE HYDROCHLORIDE 25 MG: 25 CAPSULE ORAL at 16:56

## 2020-12-28 RX ADMIN — CEFTRIAXONE SODIUM 1 G: 1 INJECTION, POWDER, FOR SOLUTION INTRAMUSCULAR; INTRAVENOUS at 14:29

## 2020-12-28 RX ADMIN — ATORVASTATIN CALCIUM 20 MG: 20 TABLET, FILM COATED ORAL at 21:15

## 2020-12-28 RX ADMIN — Medication 10 ML: at 21:47

## 2020-12-28 RX ADMIN — LACTULOSE 20 G: 20 SOLUTION ORAL at 12:19

## 2020-12-28 RX ADMIN — DIBASIC SODIUM PHOSPHATE, MONOBASIC POTASSIUM PHOSPHATE AND MONOBASIC SODIUM PHOSPHATE 1 TABLET: 852; 155; 130 TABLET ORAL at 10:12

## 2020-12-28 RX ADMIN — CHLORDIAZEPOXIDE HYDROCHLORIDE 25 MG: 25 CAPSULE ORAL at 21:15

## 2020-12-28 RX ADMIN — RIFAXIMIN 550 MG: 550 TABLET ORAL at 21:15

## 2020-12-28 RX ADMIN — PANTOPRAZOLE SODIUM 40 MG: 40 TABLET, DELAYED RELEASE ORAL at 05:16

## 2020-12-28 RX ADMIN — CHLORDIAZEPOXIDE HYDROCHLORIDE 25 MG: 25 CAPSULE ORAL at 12:19

## 2020-12-28 RX ADMIN — TAMSULOSIN HYDROCHLORIDE 0.4 MG: 0.4 CAPSULE ORAL at 10:10

## 2020-12-28 RX ADMIN — CHLORDIAZEPOXIDE HYDROCHLORIDE 25 MG: 25 CAPSULE ORAL at 10:10

## 2020-12-28 RX ADMIN — SODIUM CHLORIDE: 9 INJECTION, SOLUTION INTRAVENOUS at 15:48

## 2020-12-28 RX ADMIN — RIFAXIMIN 550 MG: 550 TABLET ORAL at 14:29

## 2020-12-28 RX ADMIN — SPIRONOLACTONE 50 MG: 25 TABLET, FILM COATED ORAL at 10:10

## 2020-12-28 ASSESSMENT — ENCOUNTER SYMPTOMS
WHEEZING: 0
CHEST TIGHTNESS: 0
NAUSEA: 0
APNEA: 0
VOMITING: 0
CONSTIPATION: 0
COUGH: 0
ABDOMINAL DISTENTION: 0
DIARRHEA: 0
ABDOMINAL PAIN: 0
SHORTNESS OF BREATH: 0

## 2020-12-28 ASSESSMENT — PAIN SCALES - GENERAL: PAINLEVEL_OUTOF10: 0

## 2020-12-28 NOTE — PROGRESS NOTES
lumbar/sacral 1 level (Bilateral, 9/6/2018); other surgical history (09/28/2018); pr njx aa&/strd tfrml epi lumbar/sacral 1 level (N/A, 9/28/2018); Pain management procedure (Left, 7/9/2020); Pain management procedure (Left, 7/20/2020); Pain management procedure (Bilateral, 8/17/2020); other surgical history (Right, 11/23/2020); Nerve Block (Right, 11/23/2020); and Pain management procedure (Bilateral, 12/7/2020). Restrictions  Restrictions/Precautions  Restrictions/Precautions: Fall Risk(1:1 sitter)  Vision/Hearing  Vision: Impaired  Vision Exceptions: Wears glasses at all times  Hearing: Exceptions to [facility]  Hearing Exceptions: Hard of hearing/hearing concerns(Deaf Left ear, difficulty hearing R ear)     Subjective  General  Patient assessed for rehabilitation services?: Yes  Additional Pertinent Hx: Abiodun Peck is a 63 y.o. male with a past history remarkable for alcoholic cirrhosis, admitted to the hospital on 12/23/2020 for hematemesis. Reports hematemesis and melena for the past 3 days. Diffuse abdominal discomfort with that. He has been heavily drinking recently. He has known alcoholic cirrhosis, GI consulted for GI bleed, plans to do EGD soon. Referral Date : 12/23/20  Diagnosis: Acute gastrointestinal bleeding  Follows Commands: Impaired  Subjective  Subjective: Pt eager to try to get up and walk, pt still slightly confused. Per 1:1 sitter, pt very weak in UE yet, decreased fine motor coordiantion, and 1:1 sitter assisted with feeding breakfast this morning. Pain Screening  Patient Currently in Pain: Denies  Vital Signs  Patient Currently in Pain: Denies  Oxygen Therapy  O2 Device: Nasal cannula  O2 Flow Rate (L/min): 2 L/min       Orientation  Orientation  Overall Orientation Status: Impaired  Orientation Level: Oriented to place; Disoriented to time;Oriented to situation  Social/Functional History  Social/Functional History  Lives With: Alone  Type of Home: House  Home Layout: One level  Home Dynamic: Fair;-(RW)        Plan   Plan  Times per week: 5 to 6 x/week  Safety Devices  Type of devices: Gait belt, Left in bed, Call light within reach, Sitter present    G-Code       OutComes Score                                                  AM-PAC Score  AM-PAC Inpatient Mobility Raw Score : 12 (12/28/20 1116)  AM-PAC Inpatient T-Scale Score : 35.33 (12/28/20 1116)  Mobility Inpatient CMS 0-100% Score: 68.66 (12/28/20 1116)  Mobility Inpatient CMS G-Code Modifier : CL (12/28/20 1116)          Goals  Short term goals  Time Frame for Short term goals: 7 visits  Short term goal 1: Pt able to perform supine<.sit at [address] term goal 2: Pt able to perform sit to stnd at min A from toilet/chair  Short term goal 3: Pt able to ambulate with RW dist of 100 ft, min A   Short term goal 4: Pt able to tolerate 20 minutes of strengthening ex's/activites to improve fucntion. Patient Goals   Patient goals : I want to walk better       Therapy Time   Individual Concurrent Group Co-treatment   Time In 0933         Time Out 1006         Minutes 33         Timed Code Treatment Minutes: 12 Minutestreatment time 12 minutes.        Devin Bryan, PT

## 2020-12-28 NOTE — PROGRESS NOTES
[facility] [place] [facility]   Progress Note    Juve Hernández is a 63 y.o. male patient. Hospitalization Day:5      Chief consult reason:   Cirrhosis  Hematemesis  Alcohol withdrawl    Subjective:  Pt seen and examined. No acute events over night. Pt working with therapy now. VITALS:  /87   Pulse 91   Temp 98.1 °F (36.7 °C) (Oral)   Resp 18   Ht 5' 10\" (1.778 m)   Wt 235 lb 0.2 oz (106.6 kg)   SpO2 99%   BMI 33.72 kg/m²   TEMPERATURE:  Current - Temp: 98.1 °F (36.7 °C); Max - Temp  Av.2 °F (36.8 °C)  Min: 97.1 °F (36.2 °C)  Max: 98.6 °F (37 °C)    Physical Assessment:  General appearance:  cooperative  Mental Status:  oriented to name, place, date  Lungs:  DM in bases  Heart:  regular rate and rhythm, no murmur  Abdomen:  soft, nontender, nondistended, normal bowel sounds, no masses, hepatomegaly, splenomegaly  Extremities:  no edema, redness, tenderness in the calves  Skin:  no gross lesions, rashes, induration    Data Review:    Labs and Imaging:     CBC:  Recent Labs     20  2100 20  0512 20  1029 20  0406 20  0407   WBC  --   --  3.7 4.1 4.2   HGB 12.0* 11.7* 11.5* 11.5* 11.4*   MCV  --   --  92.6 94.5 94.9   RDW  --   --  15.1* 15.5* 16.0*   PLT  --   --  66* 68* 76*       ANEMIA STUDIES:  No results for input(s): LABIRON, TIBC, FERRITIN, THDVWCVC90, FOLATE, OCCULTBLD in the last 72 hours. BMP:  Recent Labs     20  1029 20  0406 20  0407    143 138   K 3.5* 3.1* 3.8    110* 104   CO2 22 24 24   BUN 12 10 8   CREATININE 0.66* 0.52* 0.50*   GLUCOSE 157* 118* 104*   CALCIUM 7.5* 7.4* 7.7*       LFTS:  Recent Labs     20  0512 20  0908 20  0407   ALKPHOS 141* 127 115   ALT 28 27 25   * 96* 88*   BILITOT 2.96* 2.81* 2.45*   BILIDIR 1.77* 1.71* 1.53*   LABALBU 2.6* 2.3* 2.1*       Amylase/Lipase and Ammonia:  No results for input(s): AMYLASE, LIPASE, AMMONIA in the last 72 hours.     Acute Hepatitis Panel:  Lab Results   Component Value Date    HEPBSAG NONREACTIVE 12/23/2020    HEPCAB REACTIVE 12/23/2020    HEPBIGM NONREACTIVE 12/23/2020    HEPAIGM NONREACTIVE 12/23/2020       HCV Genotype:  Lab Results   Component Value Date    HEPATITISCGENOTYPE Mixed 09/06/2016       HCV Quantitative:  Lab Results   Component Value Date    HCVQNT NOT REPORTED 09/06/2016       LIVER WORK UP:    AFP  Lab Results   Component Value Date    AFP 3.4 12/23/2020       Alpha 1 antitrypsin   Lab Results   Component Value Date    A1A 178 07/21/2016       MARYSOL  Lab Results   Component Value Date    MARYSOL NEGATIVE 07/21/2016       AMA  Lab Results   Component Value Date    MITOAB 2.6 07/21/2016       ASMA  No results found for: SMOOTHMUSCAB    PT/INR  Recent Labs     12/25/20  0943   PROTIME 17.5*   INR 1.4       Cancer Markers:  CEA:  No results for input(s): CEA in the last 72 hours. Ca 125:  No results for input(s):  in the last 72 hours. Ca 19-9:   Invalid input(s):   AFP:   No results for input(s): AFP in the last 72 hours. Lactic acid:Invalid input(s): LACTIC ACID    Radiology Review:    Us Liver    Result Date: 12/24/2020  EXAMINATION: ULTRASOUND OF THE LIVER 12/24/2020 10:47 am COMPARISON: February 26, 2018 HISTORY: ORDERING SYSTEM PROVIDED HISTORY: elevated lft hx hep c TECHNOLOGIST PROVIDED HISTORY: elevated lft hx hep c Acuity: Acute Type of Exam: Initial FINDINGS: Pancreas is not visualized. Increased heterogeneous liver echotexture with mild nodular contour of the liver. Portal vein is patent with hepatopetal flow. No right-sided hydronephrosis. Cholelithiasis and gallbladder sludge. No gallbladder wall thickening. No pericholecystic fluid. Normal common bile duct measuring 4 mm. Hepatic cirrhosis. Cholelithiasis and gallbladder sludge without sonographic findings to suggest acute cholecystitis.          Principal Problem:    Acute gastrointestinal bleeding  Active Problems:    GERD (gastroesophageal reflux disease)    DTs (delirium tremens) (HCC)    Hypomagnesemia    Cirrhosis (HCC)    Hypokalemia    Essential hypertension    Elevated LFTs    Thrombocytopenia (HCC)    Hepatitis C virus infection resolved after antiviral drug therapy    Gastrointestinal hemorrhage with melena    Alcohol abuse    Altered mental status    Hypocalcemia    Hypophosphatemia    UTI (urinary tract infection)  Resolved Problems:    * No resolved hospital problems. *       GI Impression:    1. Alcoholic/Hep C Cirrhosis  2. Alcohol misuse disorder  3. Alcohol with drawls-appears to be at baseline  4. Hematemesis-resolved  5. Melena-resolved  6. HX treated Hep C      Plan and Recommendations:    1. Protonix 40 po daily  2. Cont Xifaxan 550 mg PO BID for encephalopathy   3. Soft diet with supplements-NPO MN  4. Daily Labs including CBC, BMP, LFT's INR  5. Strict I/O's  6. Cont Aldactone 50 mg po daily   7. Will plan for EGD Tuesday  8. Will follow-please call with any questions or concerns      This plan was formulated in collaboration with Dr. Arnaud Borden MD    Thank you for allowing me to participate in the care of your patient. Please feel free to contact me with any questions or concerns. [address] [facility]  [phone]    Attending Physician Statement  I have discussed the care of Nick Sánchez and   I have examined the patient myselft independently, and taken ros and hpi , including pertinent history and exam findings,  with the author of this note . I have reviewed the key elements of all parts of the encounter with the nurse practitioner/resident.     I agree with the assessment, plan and orders as documented by the above health care provider       Mental status is a lot better  We will advance the diet if he can tolerate it  Lactulose and Xifaxan  We will postpone the EGD another day so the patient mental status goes back to normal before we sedate him again  Electronically signed by Jose Roberto Steen Marichuy Hernandez - CNP   Attending Physician Statement  I have discussed the care of Wesley Bolton and   I have examined the patient myselft independently, and taken ros and hpi , including pertinent history and exam findings,  with the author of this note . I have reviewed the key elements of all parts of the encounter with the nurse practitioner/resident.     I agree with the assessment, plan and orders as documented by the above health care provider       EGD tomorrow  Electronically signed by Tressa Clarke MD

## 2020-12-28 NOTE — PROGRESS NOTES
RN contacted Dr. Susi Bryson regarding patient complaint of headache. Patient states he usually takes naproxen. Per Dr. Susi Bryson, no naproxen. Orders received for po tylenol 500mg Q6 prn.

## 2020-12-28 NOTE — PLAN OF CARE
Problem: Falls - Risk of:  Goal: Will remain free from falls  Description: Will remain free from falls  12/28/2020 1543 by Humaira Mota RN  Outcome: MET    Problem: Restraint Use - Nonviolent/Non-Self-Destructive Behavior:  Goal: Absence of restraint indications  Description: Absence of restraint indications  12/28/2020 1543 by Humaira Mota RN  Outcome: Ongoing   1:1 sitter d/c today d/t pt calm and cooperative. Problem: Skin Integrity:  Goal: Will show no infection signs and symptoms  Description: Will show no infection signs and symptoms  12/28/2020 1543 by Humaira Mota RN  Outcome: Ongoing   Head to toe charted. No new signs of breakdown    Problem: Musculor/Skeletal Functional Status  Goal: Highest potential functional level  Outcome: Ongoing   Physical therapy eval today. Pt had not been up since admission. Pt very weak at this time. Problem: Discharge Planning:  Goal: Discharged to appropriate level of care  Description: Discharged to appropriate level of care  Outcome: Ongoing   EGD tomorrow.  Awaiting plan post procedure

## 2020-12-28 NOTE — PROGRESS NOTES
[facility].    PROGRESS NOTE             12/28/2020    10:27 AM    Name:   Mark Alarcon  MRN:     [id number]     Acct:      [de-identified]   Room:   [address] [place] [address] Day:  5  Admit Date:  12/23/2020 10:57 AM    PCP:  MASSIMO Castorena CNP  Code Status:  Full Code    Subjective:     C/C:   Chief Complaint   Patient presents with    Hematemesis    Weight Loss     Interval History Status: improved. Patient seen and examined at bedside. No acute events overnight. Patient is currently comfortably sitting in bed, just finished breakfast. Patient is not in restraints anymore and appears much more alert and oriented. No agitation overnight. Sitter at bedside. Patient required 1 mg of Ativan this morning. No bloody bowel movements over the last 24 hours, patient has had multiple bowel movements. GI on board, plan for EGD tomorrow. Review of Systems:     Review of Systems   Constitutional: Negative for activity change, appetite change, chills, diaphoresis and fever. Respiratory: Negative for apnea, cough, chest tightness, shortness of breath and wheezing. Cardiovascular: Negative for chest pain and palpitations. Gastrointestinal: Negative for abdominal distention, abdominal pain, constipation, diarrhea, nausea and vomiting. Genitourinary: Negative for difficulty urinating, dysuria and frequency. Musculoskeletal: Negative for arthralgias and myalgias. Neurological: Negative for dizziness, light-headedness and headaches. Psychiatric/Behavioral: Negative for agitation and confusion. All other systems reviewed and are negative. Medications:      Allergies:  No Known Allergies    Current Meds:   Scheduled Meds:    lactulose  20 g Oral TID    pantoprazole  40 mg Oral QAM AC    rifaximin  550 mg Oral TID    chlordiazePOXIDE  25 mg Oral 4x Daily    tamsulosin  0.4 mg Oral Daily    vitamin D  50,000 Units Oral Weekly    phosphorus  250 mg Oral BID    cefTRIAXone (ROCEPHIN) IV  1 g Intravenous Q24H    [Held by provider] atorvastatin  20 mg Oral Nightly    spironolactone  50 mg Oral Daily    sodium chloride flush  10 mL Intravenous 2 times per day    influenza virus vaccine  0.5 mL Intramuscular Prior to discharge     Continuous Infusions:    sodium chloride 50 mL/hr at 20 2324    dexmedetomidine Stopped (20 1629)     PRN Meds: acetaminophen, sodium phosphate IVPB **OR** sodium phosphate IVPB, LORazepam, sodium chloride flush, promethazine **OR** ondansetron, polyethylene glycol, potassium chloride **OR** potassium alternative oral replacement **OR** potassium chloride, LORazepam **OR** LORazepam **OR** LORazepam **OR** LORazepam **OR** LORazepam **OR** LORazepam **OR** LORazepam **OR** LORazepam, labetalol    Data:     Past Medical History:   has a past medical history of Alcohol abuse, Arthritis, Back pain, chronic, BPH (benign prostatic hypertrophy), Cholelithiasis, Cirrhosis ([address] DDD (degenerative disc disease), lumbar, Fatty liver, GERD (gastroesophageal reflux disease), Hep C w/o coma, chronic ([address] History of colon polyps, [place] (hard of hearing), Hyperlipidemia, Hypertension, Stomach ulcer, Tobacco abuse, Tubular adenoma of colon, and Wears glasses. Social History:   reports that he quit smoking about 3 years ago. He has a 45.00 pack-year smoking history. He has never used smokeless tobacco. He reports that he does not drink alcohol or use drugs.      Family History:   Family History   Problem Relation Age of Onset   Yeimy Cushing Cancer Mother         pancreatic    Cancer Father         bone    Diabetes Sister     Asthma Brother        Vitals:  /87   Pulse 91   Temp 98.1 °F (36.7 °C) (Oral)   Resp 18   Ht 5' 10\" (1.778 m)   Wt 235 lb 0.2 oz (106.6 kg)   SpO2 99%   BMI 33.72 kg/m²   Temp (24hrs), Av.4 °F (36.9 °C), Min:98.1 °F (36.7 °C), Max:98.6 °F (37 °C)    No results for input(s): POCGLU in the last 72 hours. I/O(24Hr): Intake/Output Summary (Last 24 hours) at 12/28/2020 1027  Last data filed at 12/28/2020 0520  Gross per 24 hour   Intake 460 ml   Output 925 ml   Net -465 ml       Labs:    [unfilled]    Lab Results   Component Value Date/Time    SPECIAL 4 ML PURPLE RT AC, 1 ML RED RT [facility] 12/24/2020 09:54 AM     Lab Results   Component Value Date/Time    CULTURE NO GROWTH 3 DAYS 12/24/2020 09:54 AM       [unfilled]    Radiology:    Us Liver    Result Date: 12/24/2020  EXAMINATION: ULTRASOUND OF THE LIVER 12/24/2020 10:47 am COMPARISON: February 26, 2018 HISTORY: ORDERING SYSTEM PROVIDED HISTORY: elevated lft hx hep c TECHNOLOGIST PROVIDED HISTORY: elevated lft hx hep c Acuity: Acute Type of Exam: Initial FINDINGS: Pancreas is not visualized. Increased heterogeneous liver echotexture with mild nodular contour of the liver. Portal vein is patent with hepatopetal flow. No right-sided hydronephrosis. Cholelithiasis and gallbladder sludge. No gallbladder wall thickening. No pericholecystic fluid. Normal common bile duct measuring 4 mm. Hepatic cirrhosis. Cholelithiasis and gallbladder sludge without sonographic findings to suggest acute cholecystitis. Xr Chest Portable    Result Date: 12/24/2020  EXAMINATION: ONE XRAY VIEW OF THE CHEST 12/24/2020 2:50 pm COMPARISON: None. HISTORY: ORDERING SYSTEM PROVIDED HISTORY: AMS TECHNOLOGIST PROVIDED HISTORY: AMS Reason for Exam: AMS Acuity: Acute Type of Exam: Initial FINDINGS: A single frontal view of the chest was obtained. There is no acute skeletal abnormality. The lung volumes are low. The heart size is enlarged. The mediastinal contours are accentuated by low lung volumes, and are felt to be within normal limits. There are scattered calcified granulomata. There is pulmonary vascular congestion, without overt edema. No focus of acute airspace consolidation is identified. There is no evidence of a pneumothorax.      1. 04/24/2019    Hypokalemia [E87.6] 02/04/2019    Cirrhosis ([facility] [K74.60]     Hypomagnesemia [E83.42]     DTs (delirium tremens) ([facility] [F10.231] 07/20/2016    GERD (gastroesophageal reflux disease) [K21.9] 04/19/2012       Plan:        Acute GI bleed likely secondary to esophageal varices  -Hemoglobin 10.7>11.7>11.5>11.4, no evidence of further bleed  -Platelets 27>06>15>76>02  -Monitor platelets and H&H daily  -Patient has history of liver cirrhosis  -EGD on 12/29/2020.  -Octreotide and Protonix drip d/c   -INR 1.5, PT 18.3, PTT 37.9  -IV Rocephin for SBP prophylaxis  -GI consulted and following.     Delirium tremens (resolved)  -CIWA protocol  -Patient off Precedex drip, on Librium 25 mg qid  -Ammonia 40  -UDS negative   -History of alcohol abuse, last drink reported to be Friday  -Blood culture showed no growth     Liver cirrhosis 2/2 hepatitis C  -, ALT 28, alk phos 201, bilirubin 5.57 on presentation  -AST 96, ALT 27, alk phos 127, bilirubin 2.81-improving  -History of hep C, positive hepatitis C antibody, treated with Harvoni  -Ultrasound on February 2018 significant for findings of cirrhosis, no focal liver lesion identified  -Ultrasound liver hepatic cirrhosis with cholelithiasis and gallbladder sludge, no evidence of acute cholecystitis  -AFP 3.4  -Aldactone 50 mg p.o. daily  -Lactulose 20 g 3 times daily  -Xifaxan 550 3 times daily     UTI  -UA positive for moderate bacteria, positive nitrite, trace leukocyte esterase  -Already on Rocephin    Alcohol abuse  -Thiamine 100 mg p.o. daily   -Folic acid 1 mg p.o. daily  -Folic WBCX 8.7 and G31 1321  -Fall precautions  -Seizure precautions  -CIWA scale     Hypokalemia (resolved)  -Potassium 3.5  -Potassium placement protocol, oral K given this morning     Hypomagnesemia  -Magnesium 1.5>1.9  -Magnesium 2 g IV on 12/26  -Mag-Ox 400 mg p.o. when patient is able to tolerate oral    Diet: Dental soft  DVT prophylaxis: EPC cuffs  GI prophylaxis: Protonix 40 mg  PT/OT/SW    Dispo: Home    Avis Martinez MD  12/28/2020  10:27 AM       Attestation and add on       I have discussed the care of Jamaica Mckeon , including pertinent history and exam findings,      12/28/20    with the resident. I have seen and examined the patient and the key elements of all parts of the encounter have been performed by me . I agree with the assessment, plan and orders as documented by the resident. Principal Problem:    Acute gastrointestinal bleeding  Active Problems:    GERD (gastroesophageal reflux disease)    DTs (delirium tremens) (HCC)    Hypomagnesemia    Cirrhosis (HCC)    Hypokalemia    Essential hypertension    Elevated LFTs    Thrombocytopenia (HCC)    Hepatitis C virus infection resolved after antiviral drug therapy    Gastrointestinal hemorrhage with melena    Alcohol abuse    Altered mental status    Hypocalcemia    Hypophosphatemia    UTI (urinary tract infection)  Resolved Problems:    * No resolved hospital problems. *            --Patient is ill high risk  Treatment reviewed  We will continue-- ;        Medications:      Allergies:  No Known Allergies    Current Meds:   Scheduled Meds:    lactulose  20 g Oral TID    pantoprazole  40 mg Oral QAM AC    rifaximin  550 mg Oral TID    chlordiazePOXIDE  25 mg Oral 4x Daily    tamsulosin  0.4 mg Oral Daily    vitamin D  50,000 Units Oral Weekly    phosphorus  250 mg Oral BID    cefTRIAXone (ROCEPHIN) IV  1 g Intravenous Q24H    atorvastatin  20 mg Oral Nightly    spironolactone  50 mg Oral Daily    sodium chloride flush  10 mL Intravenous 2 times per day    influenza virus vaccine  0.5 mL Intramuscular Prior to discharge     Continuous Infusions:    sodium chloride 50 mL/hr at 12/27/20 2324    dexmedetomidine Stopped (12/26/20 1629)     PRN Meds: LORazepam, acetaminophen, sodium phosphate IVPB **OR** sodium phosphate IVPB, sodium chloride flush, promethazine **OR** ondansetron, polyethylene glycol, potassium chloride **OR** potassium alternative oral replacement **OR** potassium chloride, labetalol        [address]    Phone [phone]   Fax: [phone]  Answering Service: [phone]

## 2020-12-29 ENCOUNTER — ANESTHESIA (OUTPATIENT)
Dept: ENDOSCOPY | Age: 60
DRG: 432 | End: 2020-12-29
Payer: MEDICARE

## 2020-12-29 ENCOUNTER — ANESTHESIA EVENT (OUTPATIENT)
Dept: ENDOSCOPY | Age: 60
DRG: 432 | End: 2020-12-29
Payer: MEDICARE

## 2020-12-29 VITALS
OXYGEN SATURATION: 100 % | SYSTOLIC BLOOD PRESSURE: 120 MMHG | DIASTOLIC BLOOD PRESSURE: 6 MMHG | RESPIRATION RATE: 15 BRPM

## 2020-12-29 LAB
ABSOLUTE EOS #: 0 K/UL (ref 0–0.4)
ABSOLUTE IMMATURE GRANULOCYTE: ABNORMAL K/UL (ref 0–0.3)
ABSOLUTE LYMPH #: 0.61 K/UL (ref 1–4.8)
ABSOLUTE MONO #: 0.58 K/UL (ref 0.1–1.3)
ALBUMIN SERPL-MCNC: 2.2 G/DL (ref 3.5–5.2)
ALBUMIN/GLOBULIN RATIO: ABNORMAL (ref 1–2.5)
ALP BLD-CCNC: 113 U/L (ref 40–129)
ALT SERPL-CCNC: 28 U/L (ref 5–41)
AMMONIA: 27 UMOL/L (ref 16–60)
ANION GAP SERPL CALCULATED.3IONS-SCNC: 8 MMOL/L (ref 9–17)
AST SERPL-CCNC: 85 U/L
BASOPHILS # BLD: 0 % (ref 0–2)
BASOPHILS ABSOLUTE: 0 K/UL (ref 0–0.2)
BILIRUB SERPL-MCNC: 1.96 MG/DL (ref 0.3–1.2)
BILIRUBIN DIRECT: 1.23 MG/DL
BILIRUBIN, INDIRECT: 0.73 MG/DL (ref 0–1)
BUN BLDV-MCNC: 5 MG/DL (ref 8–23)
BUN/CREAT BLD: ABNORMAL (ref 9–20)
CALCIUM SERPL-MCNC: 8 MG/DL (ref 8.6–10.4)
CHLORIDE BLD-SCNC: 105 MMOL/L (ref 98–107)
CO2: 27 MMOL/L (ref 20–31)
CREAT SERPL-MCNC: 0.48 MG/DL (ref 0.7–1.2)
DIFFERENTIAL TYPE: ABNORMAL
EOSINOPHILS RELATIVE PERCENT: 0 % (ref 0–4)
GFR AFRICAN AMERICAN: >60 ML/MIN
GFR NON-AFRICAN AMERICAN: >60 ML/MIN
GFR SERPL CREATININE-BSD FRML MDRD: ABNORMAL ML/MIN/{1.73_M2}
GFR SERPL CREATININE-BSD FRML MDRD: ABNORMAL ML/MIN/{1.73_M2}
GLOBULIN: ABNORMAL G/DL (ref 1.5–3.8)
GLUCOSE BLD-MCNC: 104 MG/DL (ref 70–99)
HCT VFR BLD CALC: 33.5 % (ref 41–53)
HEMOGLOBIN: 10.9 G/DL (ref 13.5–17.5)
IMMATURE GRANULOCYTES: ABNORMAL %
LYMPHOCYTES # BLD: 17 % (ref 24–44)
MAGNESIUM: 1.5 MG/DL (ref 1.6–2.6)
MAGNESIUM: 2.1 MG/DL (ref 1.6–2.6)
MCH RBC QN AUTO: 30.4 PG (ref 26–34)
MCHC RBC AUTO-ENTMCNC: 32.5 G/DL (ref 31–37)
MCV RBC AUTO: 93.6 FL (ref 80–100)
MONOCYTES # BLD: 16 % (ref 1–7)
MORPHOLOGY: ABNORMAL
NRBC AUTOMATED: ABNORMAL PER 100 WBC
PDW BLD-RTO: 16.3 % (ref 11.5–14.9)
PLATELET # BLD: 80 K/UL (ref 150–450)
PLATELET ESTIMATE: ABNORMAL
PMV BLD AUTO: 8.5 FL (ref 6–12)
POTASSIUM SERPL-SCNC: 2.9 MMOL/L (ref 3.7–5.3)
POTASSIUM SERPL-SCNC: 3.1 MMOL/L (ref 3.7–5.3)
RBC # BLD: 3.58 M/UL (ref 4.5–5.9)
RBC # BLD: ABNORMAL 10*6/UL
SEG NEUTROPHILS: 67 % (ref 36–66)
SEGMENTED NEUTROPHILS ABSOLUTE COUNT: 2.41 K/UL (ref 1.3–9.1)
SODIUM BLD-SCNC: 140 MMOL/L (ref 135–144)
TOTAL PROTEIN: 5.2 G/DL (ref 6.4–8.3)
WBC # BLD: 3.6 K/UL (ref 3.5–11)
WBC # BLD: ABNORMAL 10*3/UL

## 2020-12-29 PROCEDURE — 6360000002 HC RX W HCPCS: Performed by: INTERNAL MEDICINE

## 2020-12-29 PROCEDURE — 3609012400 HC EGD TRANSORAL BIOPSY SINGLE/MULTIPLE: Performed by: INTERNAL MEDICINE

## 2020-12-29 PROCEDURE — 6370000000 HC RX 637 (ALT 250 FOR IP): Performed by: STUDENT IN AN ORGANIZED HEALTH CARE EDUCATION/TRAINING PROGRAM

## 2020-12-29 PROCEDURE — 6370000000 HC RX 637 (ALT 250 FOR IP): Performed by: INTERNAL MEDICINE

## 2020-12-29 PROCEDURE — 88305 TISSUE EXAM BY PATHOLOGIST: CPT

## 2020-12-29 PROCEDURE — 6360000002 HC RX W HCPCS: Performed by: STUDENT IN AN ORGANIZED HEALTH CARE EDUCATION/TRAINING PROGRAM

## 2020-12-29 PROCEDURE — 3700000000 HC ANESTHESIA ATTENDED CARE: Performed by: INTERNAL MEDICINE

## 2020-12-29 PROCEDURE — 84132 ASSAY OF SERUM POTASSIUM: CPT

## 2020-12-29 PROCEDURE — 7100000001 HC PACU RECOVERY - ADDTL 15 MIN: Performed by: INTERNAL MEDICINE

## 2020-12-29 PROCEDURE — 6370000000 HC RX 637 (ALT 250 FOR IP): Performed by: NURSE PRACTITIONER

## 2020-12-29 PROCEDURE — 80076 HEPATIC FUNCTION PANEL: CPT

## 2020-12-29 PROCEDURE — 36415 COLL VENOUS BLD VENIPUNCTURE: CPT

## 2020-12-29 PROCEDURE — 2580000003 HC RX 258: Performed by: STUDENT IN AN ORGANIZED HEALTH CARE EDUCATION/TRAINING PROGRAM

## 2020-12-29 PROCEDURE — 83735 ASSAY OF MAGNESIUM: CPT

## 2020-12-29 PROCEDURE — 2709999900 HC NON-CHARGEABLE SUPPLY: Performed by: INTERNAL MEDICINE

## 2020-12-29 PROCEDURE — 3700000001 HC ADD 15 MINUTES (ANESTHESIA): Performed by: INTERNAL MEDICINE

## 2020-12-29 PROCEDURE — 1200000000 HC SEMI PRIVATE

## 2020-12-29 PROCEDURE — 7100000000 HC PACU RECOVERY - FIRST 15 MIN: Performed by: INTERNAL MEDICINE

## 2020-12-29 PROCEDURE — 6360000002 HC RX W HCPCS: Performed by: NURSE ANESTHETIST, CERTIFIED REGISTERED

## 2020-12-29 PROCEDURE — 2500000003 HC RX 250 WO HCPCS: Performed by: NURSE ANESTHETIST, CERTIFIED REGISTERED

## 2020-12-29 PROCEDURE — 43239 EGD BIOPSY SINGLE/MULTIPLE: CPT | Performed by: INTERNAL MEDICINE

## 2020-12-29 PROCEDURE — 99232 SBSQ HOSP IP/OBS MODERATE 35: CPT | Performed by: INTERNAL MEDICINE

## 2020-12-29 PROCEDURE — 2580000003 HC RX 258: Performed by: INTERNAL MEDICINE

## 2020-12-29 PROCEDURE — 82140 ASSAY OF AMMONIA: CPT

## 2020-12-29 PROCEDURE — 85025 COMPLETE CBC W/AUTO DIFF WBC: CPT

## 2020-12-29 PROCEDURE — 0DB58ZX EXCISION OF ESOPHAGUS, VIA NATURAL OR ARTIFICIAL OPENING ENDOSCOPIC, DIAGNOSTIC: ICD-10-PCS | Performed by: INTERNAL MEDICINE

## 2020-12-29 PROCEDURE — 0DB68ZX EXCISION OF STOMACH, VIA NATURAL OR ARTIFICIAL OPENING ENDOSCOPIC, DIAGNOSTIC: ICD-10-PCS | Performed by: INTERNAL MEDICINE

## 2020-12-29 PROCEDURE — 80048 BASIC METABOLIC PNL TOTAL CA: CPT

## 2020-12-29 RX ORDER — CHLORDIAZEPOXIDE HYDROCHLORIDE 25 MG/1
25 CAPSULE, GELATIN COATED ORAL 3 TIMES DAILY
Status: DISCONTINUED | OUTPATIENT
Start: 2020-12-29 | End: 2020-12-29

## 2020-12-29 RX ORDER — NADOLOL 20 MG/1
20 TABLET ORAL DAILY
Status: DISCONTINUED | OUTPATIENT
Start: 2020-12-29 | End: 2020-12-31 | Stop reason: HOSPADM

## 2020-12-29 RX ORDER — CHLORDIAZEPOXIDE HYDROCHLORIDE 5 MG/1
15 CAPSULE, GELATIN COATED ORAL 3 TIMES DAILY
Status: DISCONTINUED | OUTPATIENT
Start: 2020-12-29 | End: 2020-12-30

## 2020-12-29 RX ORDER — MAGNESIUM SULFATE 1 G/100ML
1 INJECTION INTRAVENOUS PRN
Status: DISCONTINUED | OUTPATIENT
Start: 2020-12-29 | End: 2020-12-31 | Stop reason: HOSPADM

## 2020-12-29 RX ORDER — LIDOCAINE HYDROCHLORIDE 20 MG/ML
INJECTION, SOLUTION EPIDURAL; INFILTRATION; INTRACAUDAL; PERINEURAL PRN
Status: DISCONTINUED | OUTPATIENT
Start: 2020-12-29 | End: 2020-12-29 | Stop reason: SDUPTHER

## 2020-12-29 RX ORDER — PROPOFOL 10 MG/ML
INJECTION, EMULSION INTRAVENOUS PRN
Status: DISCONTINUED | OUTPATIENT
Start: 2020-12-29 | End: 2020-12-29 | Stop reason: SDUPTHER

## 2020-12-29 RX ORDER — POTASSIUM CHLORIDE 20 MEQ/1
40 TABLET, EXTENDED RELEASE ORAL ONCE
Status: COMPLETED | OUTPATIENT
Start: 2020-12-29 | End: 2020-12-29

## 2020-12-29 RX ADMIN — CEFTRIAXONE SODIUM 1 G: 1 INJECTION, POWDER, FOR SOLUTION INTRAMUSCULAR; INTRAVENOUS at 15:42

## 2020-12-29 RX ADMIN — DIBASIC SODIUM PHOSPHATE, MONOBASIC POTASSIUM PHOSPHATE AND MONOBASIC SODIUM PHOSPHATE 1 TABLET: 852; 155; 130 TABLET ORAL at 14:52

## 2020-12-29 RX ADMIN — LORAZEPAM 1 MG: 2 INJECTION INTRAMUSCULAR; INTRAVENOUS at 22:42

## 2020-12-29 RX ADMIN — CHLORDIAZEPOXIDE HYDROCHLORIDE 25 MG: 25 CAPSULE ORAL at 14:51

## 2020-12-29 RX ADMIN — LIDOCAINE HYDROCHLORIDE 60 MG: 20 INJECTION, SOLUTION EPIDURAL; INFILTRATION; INTRACAUDAL at 13:29

## 2020-12-29 RX ADMIN — PROPOFOL 5 MG: 10 INJECTION, EMULSION INTRAVENOUS at 13:32

## 2020-12-29 RX ADMIN — Medication 10 ML: at 10:07

## 2020-12-29 RX ADMIN — SPIRONOLACTONE 50 MG: 25 TABLET, FILM COATED ORAL at 10:07

## 2020-12-29 RX ADMIN — POTASSIUM CHLORIDE 10 MEQ: 10 INJECTION, SOLUTION INTRAVENOUS at 06:59

## 2020-12-29 RX ADMIN — SODIUM CHLORIDE: 9 INJECTION, SOLUTION INTRAVENOUS at 13:03

## 2020-12-29 RX ADMIN — RIFAXIMIN 550 MG: 550 TABLET ORAL at 14:53

## 2020-12-29 RX ADMIN — POTASSIUM CHLORIDE 10 MEQ: 10 INJECTION, SOLUTION INTRAVENOUS at 08:44

## 2020-12-29 RX ADMIN — LORAZEPAM 1 MG: 2 INJECTION INTRAMUSCULAR; INTRAVENOUS at 18:41

## 2020-12-29 RX ADMIN — POTASSIUM CHLORIDE 10 MEQ: 10 INJECTION, SOLUTION INTRAVENOUS at 15:42

## 2020-12-29 RX ADMIN — LIDOCAINE HYDROCHLORIDE 40 MG: 20 INJECTION, SOLUTION EPIDURAL; INFILTRATION; INTRACAUDAL at 13:31

## 2020-12-29 RX ADMIN — POTASSIUM CHLORIDE 10 MEQ: 10 INJECTION, SOLUTION INTRAVENOUS at 12:17

## 2020-12-29 RX ADMIN — NADOLOL 20 MG: 20 TABLET ORAL at 17:33

## 2020-12-29 RX ADMIN — PROPOFOL 50 MG: 10 INJECTION, EMULSION INTRAVENOUS at 13:35

## 2020-12-29 RX ADMIN — LORAZEPAM 1 MG: 2 INJECTION INTRAMUSCULAR; INTRAVENOUS at 23:44

## 2020-12-29 RX ADMIN — TAMSULOSIN HYDROCHLORIDE 0.4 MG: 0.4 CAPSULE ORAL at 10:07

## 2020-12-29 RX ADMIN — POTASSIUM CHLORIDE 40 MEQ: 1500 TABLET, EXTENDED RELEASE ORAL at 10:06

## 2020-12-29 RX ADMIN — PROPOFOL 150 MG: 10 INJECTION, EMULSION INTRAVENOUS at 13:29

## 2020-12-29 RX ADMIN — MAGNESIUM SULFATE HEPTAHYDRATE 3 G: 500 INJECTION, SOLUTION INTRAMUSCULAR; INTRAVENOUS at 11:47

## 2020-12-29 RX ADMIN — POTASSIUM CHLORIDE 10 MEQ: 10 INJECTION, SOLUTION INTRAVENOUS at 05:52

## 2020-12-29 RX ADMIN — LORAZEPAM 1 MG: 2 INJECTION INTRAMUSCULAR; INTRAVENOUS at 20:54

## 2020-12-29 ASSESSMENT — PULMONARY FUNCTION TESTS
PIF_VALUE: 1
PIF_VALUE: 0
PIF_VALUE: 1
PIF_VALUE: 1
PIF_VALUE: 0
PIF_VALUE: 1

## 2020-12-29 ASSESSMENT — LIFESTYLE VARIABLES: SMOKING_STATUS: 0

## 2020-12-29 ASSESSMENT — ENCOUNTER SYMPTOMS
STRIDOR: 0
SHORTNESS OF BREATH: 0

## 2020-12-29 ASSESSMENT — PAIN SCALES - GENERAL
PAINLEVEL_OUTOF10: 0
PAINLEVEL_OUTOF10: 0

## 2020-12-29 NOTE — PROGRESS NOTES
Comprehensive Nutrition Assessment    Type and Reason for Visit:  Initial(length of stay)    Nutrition Recommendations/Plan: Will monitor for reinitiation of diet and add supplements as appropriate to diet advance. Nutrition Assessment:  Pt admitted due to GI bleed, H/O Cirrhosis with ascites and ETOH abuse noted. He is currently npo for EGD. Pt states wt loss of 6- 42# over the week prior to admit however this is not substantiated by documented wt history. Pt states 3 days of poor intake prior to admit. Malnutrition Assessment:  Malnutrition Status: At risk for malnutrition (Comment)    Context:  Acute Illness     Findings of the 6 clinical characteristics of malnutrition:  Energy Intake:  7 - 50% or less of estimated energy requirements for 5 or more days  Weight Loss:  No significant weight loss     Body Fat Loss:  No significant body fat loss     Muscle Mass Loss:  No significant muscle mass loss    Fluid Accumulation:  No significant fluid accumulation     Strength:  Not Performed    Estimated Daily Nutrient Needs:  Energy (kcal):  6122-0071 kcal ([place] x 1.2 factor); Weight Used for Energy Requirements:  Current(105kg)     Protein (g):  1.3g/kg=  g; Weight Used for Protein Requirements:  Ideal          Nutrition Related Findings:  Edema:none, Abd: Round/Soft, Labs (12/29) K 2.9 Glu 104 Meds: Reviewed, BM 12/29      Wounds:  (pucture wounds)       Current Nutrition Therapies:    Diet NPO, After Midnight    Anthropometric Measures:  · Height: 5' 10\" (177.8 cm)  · Current Body Weight: 233 lb (105.7 kg)   · Admission Body Weight: 198 lb (89.8 kg)(stated)    · Usual Body Weight: 235 lb (106.6 kg)     · Ideal Body Weight: 166 lbs; BMI: 33.4  · BMI Categories: Obese Class 1 (BMI 30.0-34. 9)       Nutrition Diagnosis:   · Inadequate oral intake related to altered GI function as evidenced by NPO or clear liquid status due to medical condition    Nutrition Interventions:   Food and/or Nutrient

## 2020-12-29 NOTE — DISCHARGE INSTR - COC
Continuity of Care Form    Patient Name: Romayne Lade   :    MRN:  [id number]    Admit date:  2020  Discharge date:  2020  Code Status Order: Full Code   Advance Directives:   Advance Care Flowsheet Documentation       Date/Time Healthcare Directive Type of Healthcare Directive Copy in [address] Agent's Name Healthcare Agent's Phone Number    20 0014  Yes, patient has an advance directive for healthcare treatment  Living will;Durable power of  for health care  No, copy requested from family  --  --  --            Admitting Physician:  Fay Cortez MD  PCP: Teresita Leo, APRN - CNP    Discharging Nurse: Travis Farah Unit/Room#: 6634/1627-89  Discharging Unit Phone Number: [phone]    Emergency Contact:   Extended Emergency Contact Information  Primary Emergency Contact: Jonelle Rolon[address] Phone: [phone]  Work Phone: [phone]  Mobile Phone: [phone]  Relation: Other  Secondary Emergency Contact: Mallory Whitaker [address] Phone: [phone]  Work Phone: [phone]  Mobile Phone: [phone]  Relation: Other    Past Surgical History:  Past Surgical History:   Procedure Laterality Date    BUNIONECTOMY      twice on right side    BUNIONECTOMY Left     CARPAL TUNNEL RELEASE Right     COLONOSCOPY      at age 35    COLONOSCOPY  10/05/2016    polyps-pathology tubular adenoma, and abnormal looking mucosa right colon-pathology-tubular adenoma    COLONOSCOPY N/A 3/30/2018    COLONOSCOPY POLYPECTOMY COLD BIOPSY performed by Ary Galindo MD at [address] [place] [address]  2018    Small polyp in the sigmoid colon and excised with biopsy forceps--tubular adenoma    ENDOSCOPY, COLON, DIAGNOSTIC      EGD    KNEE SURGERY Left     cyst removed    NASAL SEPTUM SURGERY      NERVE BLOCK Right 2020    NERVE BLOCK RIGHT CERVICAL STEROID INJECTION  C3-C6 performed by Roe Phillips Mirta Guzmán MD at [facility]  01/04/16    steroid injection C7 T1    OTHER SURGICAL HISTORY  11/21/2016    Bilateral Lumbar CACHORRO L4-L5 injections    OTHER SURGICAL HISTORY  12/19/2016    lumbar steroid injection    OTHER SURGICAL HISTORY  09/28/2018    BILATERAL L5 CACHORRO (N/A Back)    OTHER SURGICAL HISTORY Right 11/23/2020    cervical injection    PAIN MANAGEMENT PROCEDURE Left 7/9/2020    EPIDURAL STEROID INJECTION LEFT L4 L5 performed by Shyla Mann MD at [place] [address] Left 7/20/2020    LEFT L4 L5 EPIDURAL STEROID INJECTION performed by Shyla Mann MD at [place] [address] Bilateral 8/17/2020    LUMBAR FACET BILATERAL L2-L5 performed by Shyla Mann MD at [place] [address] Bilateral 12/7/2020    NERVE BLOCK BILATERAL LUMBAR MEDIAL BRANCH L2-L5 performed by Shyla Mann MD at [place] [address] ANES/STEROID FORAMEN LUMBAR/SACRAL [address] [place] [address] ,1 LEVEL Bilateral 9/6/2018    BILATERAL L5 CACHORRO performed by Shyla Mann MD at [place] [address] ANES/STEROID [facility] ,1 LEVEL N/A 9/28/2018    BILATERAL L5 CACHORRO performed by Shyla Mann MD at [facility] N/CARPAL [address] Right 8/29/2017    CARPAL TUNNEL RELEASE RIGHT performed by Meir Nichole MD at [facility] N/CARPAL TUNNEL SURG Left 10/31/2017    CARPAL TUNNEL RELEASE performed by Meir Nichole MD at [facility] N/A 12/29/2020    EGD BIOPSY performed by Migue Mason MD at [facility]       Immunization History:   Immunization History   Administered Date(s) Administered    Hepatitis A 09/20/2016, 03/29/2017    Hepatitis B (Recombivax HB) 09/20/2016, 10/19/2016, 03/29/2017    Influenza 11/29/2012    Influenza, Quadv, IM, (6 mo and older Fluzone, Flulaval, Fluarix and 3 yrs and older Afluria) 09/13/2017, 01/24/2019    Influenza, Quadv, IM, PF (6 mo and older Fluzone, Flulaval, Fluarix, and 3 yrs and older Afluria) 09/13/2016    Pneumococcal Polysaccharide ([id number]) 11/29/2012, 07/25/2016    Tdap (Boostrix, Adacel) 04/06/2017       Active Problems:  Patient Active Problem List   Diagnosis Code    Back pain, chronic M54.9, G89.29    Hearing difficulty H91.90    GERD (gastroesophageal reflux disease) K21.9    Cervical radicular pain M54.12    DTs (delirium tremens) (HCC) F10.231    Hypomagnesemia E83.42    Chronic viral hepatitis B without delta agent and without coma (HCC) B18.1    Calculus of gallbladder without cholecystitis K80.20    Hep C w/o coma, chronic (HCC) B18.2    Fatty liver K76.0    Insomnia G47.00    Cirrhosis (HCC) K74.60    Hepatic cirrhosis (HCC) K74.60    Chronic bilateral low back pain with left-sided sciatica M54.42, G89.29    DDD (degenerative disc disease), lumbar M51.36    Depression F32.9    Grief F43.21    Tubular adenoma of colon D12.6    History of colon polyps Z86.010    Gynecomastia, male N58    Lumbar radiculitis M54.16    Lumbar disc herniation M51.26    Tinnitus H93.19    Eustachian tube dysfunction H69.80    Ganglion cyst M67.40    Carpal tunnel syndrome of right wrist G56.01    History of hepatitis C Z86.19    Vitamin D deficiency E55.9    Pure hypercholesterolemia E78.00    Hypokalemia E87.6    Essential hypertension I10    Recurrent major depressive disorder in partial remission (HCC) F33.41    S/P epidural steroid injection Z92.241    Elevated LFTs R79.89    Seasonal allergies J30.2    Lumbar facet joint syndrome M47.816    Cervical facet syndrome M47.812    Acute gastrointestinal bleeding K92.2    Thrombocytopenia (HCC) D69.6    Hepatitis C virus infection resolved after antiviral drug therapy Z86.19    Gastrointestinal hemorrhage with melena K92.1    Alcohol abuse F10.10    Altered mental status R41.82    Hypocalcemia E83.51    Hypophosphatemia E83.39    UTI (urinary tract infection) N39.0 Isolation/Infection:   Isolation            No Isolation          Patient Infection Status       Infection Onset Added Last Indicated Last Indicated By Review Planned Expiration Resolved Resolved By    None active    Resolved    COVID-19 Rule Out 07/17/20 07/17/20 07/17/20 COVID-19 (Ordered)   07/18/20 Rule-Out Test Resulted    COVID-19 Rule Out 07/06/20 07/06/20 07/06/20 COVID-19 (Ordered)   07/07/20 Rule-Out Test Resulted            Nurse Assessment:  Last Vital Signs: /67   Pulse 82   Temp 98 °F (36.7 °C) (Oral)   Resp 20   Ht 5' 10\" (1.778 m)   Wt 233 lb 7.5 oz (105.9 kg)   SpO2 95%   BMI 33.50 kg/m²     Last documented pain score (0-10 scale): Pain Level: 0  Last Weight:   Wt Readings from Last 1 Encounters:   12/29/20 233 lb 7.5 oz (105.9 kg)     Mental Status:  disoriented, oriented and alert     IV Access:  - None    Nursing Mobility/ADLs:  Walking   Assisted  Transfer  Assisted  Bathing  Assisted  Dressing  Assisted  Toileting  Assisted  Feeding  Independent  Med Admin  Assisted  Med Delivery   whole    Wound Care Documentation and Therapy:  Puncture 11/23/20 Neck Dorsal (Active)   Wound Assessment Clean;Dry; Intact 11/23/20 1457   Drainage Amount None 11/23/20 1457   Dressing/Treatment Adhesive bandage 11/23/20 1457   Dressing Status Clean;Dry; Intact 11/23/20 1457   Number of days: 36       Puncture 12/07/20 Back Lower;Medial (Active)   Wound Assessment Other (Comment) 12/24/20 0444   Closure Adhesive bandage 12/24/20 0444   Drainage Amount None 12/24/20 0444   Odor None 12/24/20 0444   Dressing/Treatment Adhesive bandage 12/24/20 0444   Dressing Status Clean;Dry; Intact 12/24/20 0444   Number of days: 22        Elimination:  Continence:   · Bowel:  Yes  · Bladder: No, incontinent at times, wears a brief  Urinary Catheter: None   Colostomy/Ileostomy/Ileal Conduit: No       Date of Last BM:     Intake/Output Summary (Last 24 hours) at 12/29/2020 1600  Last data filed at 12/29/2020 1532  Gross per 24 hour   Intake 1515 ml   Output 1350 ml   Net 165 ml     I/O last 3 completed shifts: In: 2215 [P.O.:500; I.V.:1665; IV Piggyback:50]  Out: 475 [Urine:475]    Safety Concerns: At Risk for Falls    Impairments/Disabilities:      None    Nutrition Therapy:  Current Nutrition Therapy:   - Oral Diet:  General    Routes of Feeding: Oral  Liquids: No Restrictions  Daily Fluid Restriction: no  Last Modified Barium Swallow with Video (Video Swallowing Test): not done    Treatments at the Time of Hospital Discharge:   Respiratory Treatments: none  Oxygen Therapy:  is not on home oxygen therapy. Ventilator:    - No ventilator support    Rehab Therapies: Physical Therapy and Occupational Therapy  Weight Bearing Status/Restrictions: Other Medical Equipment (for information only, NOT a DME order):     Other Treatments: skilled nursing assessment per protocol   not be able to do the start of care until Jan 1st or 2nd  Patient's personal belongings (please select all that are sent with patient):  Glasses    RN SIGNATURE:  Electronically signed by Timmy Waggoner RN on 12/30/20 at 5:32 PM EST    CASE MANAGEMENT/SOCIAL WORK SECTION    Inpatient Status Date: 12/23/20    Readmission Risk Assessment Score:  Readmission Risk              Risk of Unplanned Readmission:        17           Discharging to Facility/ [address] [place], [place] [id number]: [phone]  F: [phone]    Dialysis Facility (if applicable)   · Name:  · Address:  · Dialysis Schedule:  · Phone:  · Fax:    / signature: Electronically signed by Rosario Morel RN on 12/29/2020 at 4:02 PM      PHYSICIAN SECTION    Prognosis: Good    Condition at Discharge: Stable    Rehab Potential (if transferring to Rehab): Good    Recommended Labs or Other Treatments After Discharge:     Physician Certification: I certify the above information and transfer of Serafin Smith  is necessary for the continuing treatment of the diagnosis listed and that he requires [place] for less than 30 days.      Update Admission H&P: No change in H&P    PHYSICIAN SIGNATURE:  Electronically signed by Christine Tamez MD on 12/29/20 at 4:18 PM EST

## 2020-12-29 NOTE — PLAN OF CARE
Problem: Falls - Risk of:  Goal: Will remain free from falls  Description: Will remain free from falls  Outcome: Ongoing  Goal: Absence of physical injury  Description: Absence of physical injury  Outcome: Ongoing     Problem: Skin Integrity:  Goal: Will show no infection signs and symptoms  Description: Will show no infection signs and symptoms  Outcome: Ongoing  Goal: Absence of new skin breakdown  Description: Absence of new skin breakdown  Outcome: Ongoing     Problem: Musculor/Skeletal Functional Status  Goal: Highest potential functional level  Outcome: Ongoing  Goal: Absence of falls  Outcome: Ongoing     Problem: Discharge Planning:  Goal: Discharged to appropriate level of care  Description: Discharged to appropriate level of care  Outcome: Ongoing     Problem: Fluid Volume - Deficit:  Goal: Absence of fluid volume deficit signs and symptoms  Description: Absence of fluid volume deficit signs and symptoms  Outcome: Ongoing     Problem: Nutrition Deficit:  Goal: Ability to achieve adequate nutritional intake will improve  Description: Ability to achieve adequate nutritional intake will improve  Outcome: Ongoing     Problem: Sleep Pattern Disturbance:  Goal: Appears well-rested  Description: Appears well-rested  Outcome: Ongoing     Problem: Violence - Risk of, Self/Other-Directed:  Goal: Knowledge of developmental care interventions  Description: Absence of violence  Outcome: Ongoing     Problem: Nutrition  Goal: Optimal nutrition therapy  Description: Nutrition Problem #1: Inadequate oral intake  Intervention: Food and/or Nutrient Delivery: Start Oral Diet  Nutritional Goals: restart of diet within 48 hours     Outcome: Ongoing

## 2020-12-29 NOTE — FLOWSHEET NOTE
Pt anxiously waits surgery since Monday 12/29/20 1228   Encounter Summary   Services provided to: Patient   Referral/Consult From: Rounding   Continue Visiting   (12-29-20)   Complexity of Encounter Moderate   Length of Encounter 15 minutes   Spiritual Assessment Completed Yes   Routine   Type Initial   Assessment Approachable; Anxious; Coping   Intervention Active listening;Explored feelings, thoughts, concerns   Outcome Expressed gratitude;Expressed feelings/needs/concerns;Engaged in conversation;Receptive

## 2020-12-29 NOTE — OP NOTE
throughout the procedure. the vital signs remained stable , and no immediate complication form the procedure noted, patient will be ready for d/c when criteria is met . Findings:    Retropharyngeal area was grossly normal appearing    Esophagus: abnormal: Garde 2 EV, no stigmata of bleeding   H H , polypoidal flat lesion in the hernia sac, gentle bx        Stomach:    Fundus:  severe portal HTN gastropathy     Body: abnormal: severe portal HTN gastropathy    Antrum: abnormal: severe portal HTN gastropathy    Duodenum:     Descending: normal    Bulb: normal    The scope was removed and the patient tolerated the procedure well. Recommendations/Plan:   1. F/U Biopsies  2. Nonselective beta-blocker like nadolol or Inderal  3.  Okay to discharge and follow as an outpatient    Electronically signed by Paula Morse MD  on 12/29/2020 at 2:07 PM

## 2020-12-29 NOTE — PLAN OF CARE
Problem: Falls - Risk of:  Goal: Will remain free from falls  Description: Will remain free from falls  12/29/2020 1823 by Cornelio Redman RN  Outcome: Ongoing  12/29/2020 1813 by Cornelio Redman RN  Outcome: Ongoing  Goal: Absence of physical injury  Description: Absence of physical injury  12/29/2020 1823 by Cornelio Redman RN  Outcome: Ongoing  12/29/2020 1813 by Cornelio Redman RN  Outcome: Ongoing     Problem: Skin Integrity:  Goal: Will show no infection signs and symptoms  Description: Will show no infection signs and symptoms  12/29/2020 1823 by Cornelio Redman RN  Outcome: Ongoing  12/29/2020 1813 by Cornelio Redman RN  Outcome: Ongoing  Goal: Absence of new skin breakdown  Description: Absence of new skin breakdown  12/29/2020 1823 by Cornelio Redman RN  Outcome: Ongoing  12/29/2020 1813 by Cornelio Redman RN  Outcome: Ongoing     Problem: Musculor/Skeletal Functional Status  Goal: Highest potential functional level  12/29/2020 1823 by Cornelio Redman RN  Outcome: Ongoing  12/29/2020 1813 by Cornelio Redman RN  Outcome: Ongoing  Goal: Absence of falls  12/29/2020 1823 by Cornelio Redman RN  Outcome: Ongoing  12/29/2020 1813 by Cornelio Redman RN  Outcome: Ongoing     Problem: Discharge Planning:  Goal: Discharged to appropriate level of care  Description: Discharged to appropriate level of care  12/29/2020 1823 by Cornelio Redman RN  Outcome: Ongoing  12/29/2020 1813 by Cornelio Redman RN  Outcome: Ongoing     Problem: Fluid Volume - Deficit:  Goal: Absence of fluid volume deficit signs and symptoms  Description: Absence of fluid volume deficit signs and symptoms  12/29/2020 1823 by Cornelio Redman RN  Outcome: Ongoing  12/29/2020 1813 by Cornelio Redman RN  Outcome: Ongoing     Problem: Nutrition Deficit:  Goal: Ability to achieve adequate nutritional intake will improve  Description: Ability to achieve adequate nutritional intake will improve  12/29/2020 1823 by Cornelio Redman RN  Outcome: Ongoing  12/29/2020 1813 by Cornelio Redman RN Outcome: Ongoing     Problem: Sleep Pattern Disturbance:  Goal: Appears well-rested  Description: Appears well-rested  12/29/2020 1823 by Cornelio Redman RN  Outcome: Ongoing  12/29/2020 1813 by Cornelio Redman RN  Outcome: Ongoing     Problem: Violence - Risk of, Self/Other-Directed:  Goal: Knowledge of developmental care interventions  Description: Absence of violence  12/29/2020 1823 by Cornelio Redman RN  Outcome: Ongoing  12/29/2020 1813 by Cornelio Redman RN  Outcome: Ongoing     Problem: Nutrition  Goal: Optimal nutrition therapy  Description: Nutrition Problem #1: Inadequate oral intake  Intervention: Food and/or Nutrient Delivery: Start Oral Diet  Nutritional Goals: restart of diet within 48 hours     12/29/2020 1823 by Cornelio Redman RN  Outcome: Ongoing  12/29/2020 1813 by Cornelio Redman RN  Outcome: Ongoing

## 2020-12-29 NOTE — PLAN OF CARE
Problem: Falls - Risk of:  Goal: Will remain free from falls  Description: Will remain free from falls  12/29/2020 0336 by Johnathan Barrett RN  Outcome: Ongoing  Note: Pt assessed as a fall risk this shift. Remains free from falls and accidental injury at this time. Fall precautions in place, including falling star sign and fall risk band on pt. Floor free from obstacles, and bed is locked and in lowest position. Adequate lighting provided. Pt encouraged to call before getting OOB for any need. Bed alarm activated. Will continue to monitor needs during hourly rounding, and reinforce education on use of call light. Patient ambulates with x2 staff assistance and walker. Bed kept in low position. Safe environment maintained. Bedside table & call light in reach. Uses call light appropriately when needing assistance. Problem: Skin Integrity:  Goal: Will show no infection signs and symptoms  Description: Will show no infection signs and symptoms  12/29/2020 0336 by Johnathan Barrett RN  Outcome: Ongoing  Note: Skin assessment complete. Waffle mattress in place. Coccyx reddened. Sensicare applied PRN. Turned and repositioned every two hours. Area kept free from moisture. Proper nourishment and fluids encouraged, as appropriate. Will continue to monitor for additional needs and changes in skin breakdown. Problem: Restraint Use - Nonviolent/Non-Self-Destructive Behavior:  Goal: Absence of restraint indications  Description: Absence of restraint indications  12/29/2020 0336 by Johnathan Barrett RN  Outcome: Completed  Note: No restraints used during this shift; pt able to re-orient to place and situation when confused.      Problem: Restraint Use - Nonviolent/Non-Self-Destructive Behavior:  Goal: Absence of restraint-related injury  Description: Absence of restraint-related injury  12/29/2020 0336 by Johnathan Barrett RN  Outcome: Completed

## 2020-12-29 NOTE — ANESTHESIA POSTPROCEDURE EVALUATION
POST- ANESTHESIA EVALUATION       Pt Name: Dorcas Ortiz  MRN: [id number]  YOB: 1960  Date of evaluation: 12/29/2020  Time:  2:36 PM      /68   Pulse 81   Temp 97.5 °F (36.4 °C)   Resp 24   Ht 5' 10\" (1.778 m)   Wt 233 lb 7.5 oz (105.9 kg)   SpO2 95%   BMI 33.50 kg/m²      Consciousness Level  Awake  Cardiopulmonary Status  Stable  Pain Adequately Treated YES  Nausea / Vomiting  NO  Adequate Hydration  YES  Anesthesia Related Complications NONE      Electronically signed by Severo Gavel, MD on 12/29/2020 at 2:36 PM       Department of Anesthesiology  Postprocedure Note    Patient: Dorcas Ortiz  MRN: [id number]  YOB: 1960  Date of evaluation: 12/29/2020  Time:  2:36 PM     Procedure Summary     Date: 12/29/20 Room / Location: [place] [id number] / [facility]    Anesthesia Start: 1326 Anesthesia Stop: 1727    Procedure: EGD BIOPSY (N/A Esophagus) Diagnosis: (HEMATEMESIS)    Surgeons: Juan Alberto Chung MD Responsible Provider: Severo Gavel, MD    Anesthesia Type: MAC, general ASA Status: 3          Anesthesia Type: No value filed. Yen Phase I: Yen Score: 10    Yen Phase II:      Last vitals: Reviewed and per EMR flowsheets.        Anesthesia Post Evaluation

## 2020-12-29 NOTE — PLAN OF CARE
Nutrition Problem #1: Inadequate oral intake  Intervention: Food and/or Nutrient Delivery: Start Oral Diet  Nutritional Goals: restart of diet within 48 hours

## 2020-12-29 NOTE — CARE COORDINATION
ONGOING DISCHARGE PLAN:    Reviewed patients chart regarding discharge plan and chart still confirms the plan is to discharge to home vs [place]   Patient remains on iv atb   Patient was positive UA already on iv atb  Patient will have a EGD done today   On ciwa protocol   Potassium replaced     Will review plan with patient and or family      Will continue to follow for additional discharge needs.      Electronically signed by Dorothea Ontiveros RN on 12/29/2020 at 12:30 PM

## 2020-12-29 NOTE — PROGRESS NOTES
[facility]    PROGRESS NOTE             12/29/2020    10:34 AM    Name:   William Magana  MRN:     [id number]     Acct:      [de-identified]   Room:   2059/2059-01  IP Day:  6  Admit Date:  12/23/2020 10:57 AM    PCP:  MASSIMO Mistry CNP  Code Status:  Full Code    Subjective:     C/C:   Chief Complaint   Patient presents with    Hematemesis    Weight Loss     Interval History Status: not changed. Patient seen and examined at bedside this morning. No acute changes overnight. Patient denies any nausea vomiting, diarrhea, bright red blood in stool, chest pain, shortness of breath, abdominal pain. Patient is to undergo EGD this morning. Potassium and magnesium noted to be low and will be replaced. Review of Systems:     Review of Systems   Constitutional: Negative for activity change, appetite change, chills, diaphoresis and fever. Respiratory: Negative for apnea, cough, chest tightness, shortness of breath and wheezing. Cardiovascular: Negative for chest pain and palpitations. Gastrointestinal: Negative for abdominal distention, abdominal pain, constipation, diarrhea, nausea and vomiting. Genitourinary: Negative for difficulty urinating, dysuria and frequency. Musculoskeletal: Negative for arthralgias and myalgias. Neurological: Negative for dizziness, light-headedness and headaches. Psychiatric/Behavioral: Negative for agitation and confusion. All other systems reviewed and are negative       Medications:      Allergies:  No Known Allergies    Current Meds:   Scheduled Meds:    chlordiazePOXIDE  25 mg Oral TID    magnesium sulfate  3 g Intravenous Once    lactulose  20 g Oral TID    pantoprazole  40 mg Oral QAM AC    rifaximin  550 mg Oral TID    tamsulosin  0.4 mg Oral Daily    vitamin D  50,000 Units Oral Weekly    phosphorus  250 mg Oral BID    cefTRIAXone (ROCEPHIN) IV  1 g Intravenous Q24H  atorvastatin  20 mg Oral Nightly    spironolactone  50 mg Oral Daily    sodium chloride flush  10 mL Intravenous 2 times per day    influenza virus vaccine  0.5 mL Intramuscular Prior to discharge     Continuous Infusions:    sodium chloride 50 mL/hr at 20 1548     PRN Meds: magnesium sulfate, LORazepam, acetaminophen, sodium phosphate IVPB **OR** sodium phosphate IVPB, sodium chloride flush, promethazine **OR** ondansetron, polyethylene glycol, potassium chloride **OR** potassium alternative oral replacement **OR** potassium chloride, labetalol    Data:     Past Medical History:   has a past medical history of Alcohol abuse, Arthritis, Back pain, chronic, BPH (benign prostatic hypertrophy), Cholelithiasis, Cirrhosis ([facility] [address] DDD (degenerative disc disease), lumbar, Fatty liver, GERD (gastroesophageal reflux disease), Hep C w/o coma, chronic ([facility] [address] History of colon polyps, Inupiat (hard of hearing), Hyperlipidemia, Hypertension, Stomach ulcer, Tobacco abuse, Tubular adenoma of colon, and Wears glasses. Social History:   reports that he quit smoking about 3 years ago. He has a 45.00 pack-year smoking history. He has never used smokeless tobacco. He reports that he does not drink alcohol or use drugs. Family History:   Family History   Problem Relation Age of Onset   Jeanne Officer Cancer Mother         pancreatic    Cancer Father         bone    Diabetes Sister     Asthma Brother        Vitals:  /76   Pulse 92   Temp 98.5 °F (36.9 °C) (Oral)   Resp 15   Ht 5' 10\" (1.778 m)   Wt 233 lb 7.5 oz (105.9 kg)   SpO2 96%   BMI 33.50 kg/m²   Temp (24hrs), Av.3 °F (36.8 °C), Min:98.1 °F (36.7 °C), Max:98.5 °F (36.9 °C)    No results for input(s): POCGLU in the last 72 hours. I/O(24Hr):     Intake/Output Summary (Last 24 hours) at 2020 1034  Last data filed at 2020 0506  Gross per 24 hour   Intake 2165 ml   Output 475 ml   Net 1690 ml       Labs:    [unfilled]    Lab Results   Component Value Date/Time    SPECIAL 4 ML PURPLE RT AC, 1 ML RED RT [facility] 12/24/2020 09:54 AM     Lab Results   Component Value Date/Time    CULTURE NO GROWTH 4 DAYS 12/24/2020 09:54 AM       [unfilled]    Radiology:    Us Liver    Result Date: 12/24/2020  EXAMINATION: ULTRASOUND OF THE LIVER 12/24/2020 10:47 am COMPARISON: February 26, 2018 HISTORY: ORDERING SYSTEM PROVIDED HISTORY: elevated lft hx hep c TECHNOLOGIST PROVIDED HISTORY: elevated lft hx hep c Acuity: Acute Type of Exam: Initial FINDINGS: Pancreas is not visualized. Increased heterogeneous liver echotexture with mild nodular contour of the liver. Portal vein is patent with hepatopetal flow. No right-sided hydronephrosis. Cholelithiasis and gallbladder sludge. No gallbladder wall thickening. No pericholecystic fluid. Normal common bile duct measuring 4 mm. Hepatic cirrhosis. Cholelithiasis and gallbladder sludge without sonographic findings to suggest acute cholecystitis. Xr Chest Portable    Result Date: 12/24/2020  EXAMINATION: ONE XRAY VIEW OF THE CHEST 12/24/2020 2:50 pm COMPARISON: None. HISTORY: ORDERING SYSTEM PROVIDED HISTORY: AMS TECHNOLOGIST PROVIDED HISTORY: AMS Reason for Exam: AMS Acuity: Acute Type of Exam: Initial FINDINGS: A single frontal view of the chest was obtained. There is no acute skeletal abnormality. The lung volumes are low. The heart size is enlarged. The mediastinal contours are accentuated by low lung volumes, and are felt to be within normal limits. There are scattered calcified granulomata. There is pulmonary vascular congestion, without overt edema. No focus of acute airspace consolidation is identified. There is no evidence of a pneumothorax. 1. Low lung volumes, without acute airspace consolidation. 2. Pulmonary vascular congestion, without overt edema. 3. Cardiomegaly. Fluoro For Surgical Procedures    Result Date: 12/7/2020  Radiology exam is complete. No Radiologist dictation.  Please follow up with esophageal varices  -Hemoglobin 10.9, appears stable  -Platelets 80  -Monitor platelets and H&H daily  -Patient has history of liver cirrhosis  -EGD today  -INR 1.5, PT 18.3, PTT 37.9  -IV Rocephin for SBP prophylaxis  -GI consulted and following.     Delirium tremens (resolved)  -CIWA protocol  -Patient off Precedex drip, on Librium 25 mg TID  -UDS negative   -History of alcohol abuse, last drink reported to be Friday  -Blood culture showed no growth     Liver cirrhosis 2/2 hepatitis C  -, ALT 28, alk phos 201, bilirubin 5.57 on presentation  -AST 96, ALT 27, alk phos 127, bilirubin 2.81-improving  -History of hep C, positive hepatitis C antibody, treated with Harvoni  -Ultrasound on February 2018 significant for findings of cirrhosis, no focal liver lesion identified  -Ultrasound liver hepatic cirrhosis with cholelithiasis and gallbladder sludge, no evidence of acute cholecystitis  -AFP 3.4  -Aldactone 50 mg p.o. daily  -Lactulose 20 g 3 times daily  -Xifaxan 550 3 times daily     UTI  -UA positive for moderate bacteria, positive nitrite, trace leukocyte esterase  -Already on Rocephin     Alcohol abuse  -Thiamine 100 mg p.o. daily   -Folic acid 1 mg p.o. daily  -Folic UGJM 9.2 and H21 1321  -Fall precautions  -Seizure precautions  -CIWA scale     Hypokalemia (resolved)  -Potassium 3.5  -Potassium placement protocol, oral K given this morning     Hypomagnesemia  -Magnesium 1.5>1.9  -Magnesium 2 g IV on 12/26  -Mag-Ox 400 mg p.o. when patient is able to tolerate oral    Ricardo Murray MD  12/29/2020  10:34 AM     Attestation and add on       I have discussed the care of Mark Alarcon , including pertinent history and exam findings,      12/29/20    with the resident. I have seen and examined the patient and the key elements of all parts of the encounter have been performed by me . I agree with the assessment, plan and orders as documented by the resident.      Principal Problem:    Acute gastrointestinal bleeding  Active Problems:    GERD (gastroesophageal reflux disease)    DTs (delirium tremens) (HCC)    Hypomagnesemia    Cirrhosis (HCC)    Hypokalemia    Essential hypertension    Elevated LFTs    Thrombocytopenia (HCC)    Hepatitis C virus infection resolved after antiviral drug therapy    Gastrointestinal hemorrhage with melena    Alcohol abuse    Altered mental status    Hypocalcemia    Hypophosphatemia    UTI (urinary tract infection)  Resolved Problems:    * No resolved hospital problems. *            ---- ;        Medications: Allergies:  No Known Allergies    Current Meds:   Scheduled Meds:    FLUoxetine  20 mg Oral Daily    lactulose  20 g Oral Q1H    potassium & sodium phosphates  1 packet Oral BID    chlordiazePOXIDE  15 mg Oral TID    nadolol  20 mg Oral Daily    pantoprazole  40 mg Oral QAM AC    rifaximin  550 mg Oral TID    tamsulosin  0.4 mg Oral Daily    vitamin D  50,000 Units Oral Weekly    atorvastatin  20 mg Oral Nightly    spironolactone  50 mg Oral Daily    sodium chloride flush  10 mL Intravenous 2 times per day    influenza virus vaccine  0.5 mL Intramuscular Prior to discharge     Continuous Infusions:   PRN Meds: LORazepam **OR** [DISCONTINUED] LORazepam, magnesium sulfate, acetaminophen, sodium phosphate IVPB **OR** sodium phosphate IVPB, sodium chloride flush, promethazine **OR** ondansetron, polyethylene glycol, potassium chloride **OR** potassium alternative oral replacement **OR** potassium chloride, labetalol        [facility] [address], [address] [place] [address].    Phone [phone]   Fax: [phone]  Answering Service: [phone]

## 2020-12-29 NOTE — ANESTHESIA PRE PROCEDURE
Department of Anesthesiology  Preprocedure Note       Name:  Karel Schwartz   Age:  63 y.o.  :  1960                                          MRN:  [id number]         Date:  2020      Surgeon: Renu Thornton):  Mike Almanza MD    Procedure: Procedure(s):  EGD ESOPHAGOGASTRODUODENOSCOPY    Medications prior to admission:   Prior to Admission medications    Medication Sig Start Date End Date Taking?  Authorizing Provider   FLUoxetine (PROZAC) 20 MG capsule take 1 capsule by mouth once daily 20  Yes MASSIMO Coleman CNP   ANDRODERM 4 MG/24HR PT24 APPLY 1 PATCH TO SHOULDER EVERY DAY 10/28/20  Yes Historical Provider, MD   hydrOXYzine (VISTARIL) 25 MG capsule take 1 capsule by mouth three times a day if needed for anxiety 20  Yes MASSIMO Coleman CNP   atorvastatin (LIPITOR) 20 MG tablet take 1 tablet by mouth at bedtime 20  Yes MASSIMO Coleman CNP   spironolactone (ALDACTONE) 50 MG tablet take 1 tablet by mouth once daily 20  Yes MASSIMO Coleman CNP   vitamin D (ERGOCALCIFEROL) 1.25 MG (23910 UT) CAPS capsule take 1 capsule by mouth every week 20  Yes MASSIMO Coleman CNP   sildenafil (VIAGRA) 100 MG tablet Take 1 tablet by mouth as needed for Erectile Dysfunction 20   MASSIMO Carl Asp, CNP       Current medications:    Current Facility-Administered Medications   Medication Dose Route Frequency Provider Last Rate Last Admin    chlordiazePOXIDE (LIBRIUM) capsule 25 mg  25 mg Oral TID Ashok Mercer MD   Stopped at 20 1006    magnesium sulfate 1 g in dextrose 5% 100 mL IVPB  1 g Intravenous PRN Ashok Mercer MD        magnesium sulfate 3 g in dextrose 5 % 100 mL IVPB  3 g Intravenous Once Pao Blue MD 33.3 mL/hr at 20 1147 3 g at 20 1147    LORazepam (ATIVAN) injection 1 mg  1 mg Intravenous Q1H PRN Pao Blue MD        lactulose (CHRONULAC) 10 GM/15ML solution 20 g  20 g Oral TID MASSIMO Headley CNP Stopped at 12/29/20 1038    pantoprazole (PROTONIX) tablet 40 mg  40 mg Oral QAM AC Kei Mansfield Asif, APRN - CNP   Stopped at 12/29/20 1038    rifaximin (XIFAXAN) tablet 550 mg  550 mg Oral TID Kei Borgesdouglas Gold, APRN - CNP   Stopped at 12/29/20 1007    acetaminophen (TYLENOL) tablet 500 mg  500 mg Oral Q6H PRN Nunu Denis MD   500 mg at 12/27/20 2127    sodium phosphate 17.19 mmol in dextrose 5 % 250 mL IVPB  0.16 mmol/kg Intravenous PRN Trina Jauregui MD        Or    sodium phosphate 34.41 mmol in dextrose 5 % 250 mL IVPB  0.32 mmol/kg Intravenous PRN Trina Jauregui MD        0.9 % sodium chloride infusion   Intravenous Continuous Estela Bernheim, MD 50 mL/hr at 12/28/20 1548 New Bag at 12/28/20 1548    tamsulosin (FLOMAX) capsule 0.4 mg  0.4 mg Oral Daily Amy Sánchez MD   0.4 mg at 12/29/20 1007    vitamin D (ERGOCALCIFEROL) capsule 50,000 Units  50,000 Units Oral Weekly Estela Bernheim, MD        phosphorus (K PHOS NEUTRAL) tablet 1 tablet  250 mg Oral BID Estela Bernheim, MD   Stopped at 12/29/20 1038    cefTRIAXone (ROCEPHIN) 1 g IVPB in 50 mL D5W minibag  1 g Intravenous Q24H Ronak Veliz MD   Stopped at 12/28/20 1459    atorvastatin (LIPITOR) tablet 20 mg  20 mg Oral Nightly Ronak Veliz MD   20 mg at 12/28/20 2115    spironolactone (ALDACTONE) tablet 50 mg  50 mg Oral Daily Ronak Veliz MD   50 mg at 12/29/20 1007    sodium chloride flush 0.9 % injection 10 mL  10 mL Intravenous 2 times per day Ronak Veliz MD   10 mL at 12/29/20 1007    sodium chloride flush 0.9 % injection 10 mL  10 mL Intravenous PRN Ronak Veliz MD        promethazine (PHENERGAN) tablet 12.5 mg  12.5 mg Oral Q6H PRN Ronak Veliz MD        Or    on[facility]etron [facility]) injection 4 mg  4 mg Intravenous Q6H PRN Ronak Veliz MD        polyethylene glycol (GLYCOLAX) packet 17 g  17 g Oral Daily PRN Ronak Veliz MD        influenza quadrivalent split vaccine (FLUZONE;FLUARIX;FLULAVAL;AFLURIA) injection 0.5 mL  0.5 mL Intramuscular Prior to discharge Kerry Kelsey MD        potassium chloride (KLOR-CON M) extended release tablet 40 mEq  40 mEq Oral PRN Henrry Zamora MD   40 mEq at 12/27/20 3343    Or    potassium bicarb-citric acid (EFFER-K) effervescent tablet 40 mEq  40 mEq Oral PRN Henrry Zamora MD        Or    potassium chloride 10 mEq/100 mL IVPB (Peripheral Line)  10 mEq Intravenous PRN Henrry Zamora  mL/hr at 12/29/20 1217 10 mEq at 12/29/20 1217    labetalol (NORMODYNE;TRANDATE) injection 20 mg  20 mg Intravenous Q2H PRN Henrry Zamora MD           Allergies:  No Known Allergies    Problem List:    Patient Active Problem List   Diagnosis Code    Back pain, chronic M54.9, G89.29    Hearing difficulty H91.90    GERD (gastroesophageal reflux disease) K21.9    Cervical radicular pain M54.12    DTs (delirium tremens) (HCC) F10.231    Hypomagnesemia E83.42    Chronic viral hepatitis B without delta agent and without coma (HCC) B18.1    Calculus of gallbladder without cholecystitis K80.20    Hep C w/o coma, chronic (HCC) B18.2    Fatty liver K76.0    Insomnia G47.00    Cirrhosis ([facility] [address] K74.60    Hepatic cirrhosis (HCC) K74.60    Chronic bilateral low back pain with left-sided sciatica M54.42, G89.29    DDD (degenerative disc disease), lumbar M51.36    Depression F32.9    Grief F43.21    Tubular adenoma of colon D12.6    History of colon polyps Z86.010    Gynecomastia, male N58    Lumbar radiculitis M54.16    Lumbar disc herniation M51.26    Tinnitus H93.19    Eustachian tube dysfunction H69.80    Ganglion cyst M67.40    Carpal tunnel syndrome of right wrist G56.01    History of hepatitis C Z86.19    Vitamin D deficiency E55.9    Pure hypercholesterolemia E78.00    Hypokalemia E87.6    Essential hypertension I10    Recurrent major depressive disorder in partial remission ([facility] [address] F33.41    S/P epidural steroid injection Z92.241    Elevated LFTs R79.89    Seasonal OTHER SURGICAL HISTORY  11/21/2016    Bilateral Lumbar CACHORRO L4-L5 injections    OTHER SURGICAL HISTORY  12/19/2016    lumbar steroid injection    OTHER SURGICAL HISTORY  09/28/2018    BILATERAL L5 CACHORRO (N/A Back)    OTHER SURGICAL HISTORY Right 11/23/2020    cervical injection    PAIN MANAGEMENT PROCEDURE Left 7/9/2020    EPIDURAL STEROID INJECTION LEFT L4 L5 performed by Ximena Walton MD at [facility] Left 7/20/2020    LEFT L4 L5 EPIDURAL STEROID INJECTION performed by Ximena Walton MD at [facility] Bilateral 8/17/2020    LUMBAR FACET BILATERAL L2-L5 performed by Ximena Walton MD at [facility] Bilateral 12/7/2020    NERVE BLOCK BILATERAL LUMBAR MEDIAL BRANCH L2-L5 performed by Ximena Walton MD at [place] [address] ANES/STEROID FORAMEN LUMBAR/SACRAL [address] ,1 LEVEL Bilateral 9/6/2018    BILATERAL L5 CACHORRO performed by Ximena Walton MD at [place] [address] ANES/STEROID [address] [facility] ,1 LEVEL N/A 9/28/2018    BILATERAL L5 CACHORRO performed by Ximena Walton MD at [address] [place] [address] N/CARPAL TUNNEL SURG Right 8/29/2017    CARPAL TUNNEL RELEASE RIGHT performed by Ronnie Mccallum MD at [address] [place] [address] N/CARPAL TUNNEL SURG Left 10/31/2017    CARPAL TUNNEL RELEASE performed by Ronnie Mccallum MD at [place] [address] History:    Social History     Tobacco Use    Smoking status: Former Smoker     Packs/day: 1.00     Years: 45.00     Pack years: 45.00     Quit date: 1/17/2017     Years since quitting: 3.9    Smokeless tobacco: Never Used   Substance Use Topics    Alcohol use: No     Comment: 7/17/2016 he stopped drinking                                Counseling given: Not Answered      Vital Signs (Current):   Vitals:    12/28/20 1145 12/28/20 1911 12/29/20 0128 12/29/20 0741   BP: 132/69 (!) 145/76  128/76   Pulse: 96 96  92   Resp: 19 16  15   Temp: 98.1 °F (36.7 °C) 98.4 °F 07/17/2020         Anesthesia Evaluation  Patient summary reviewed and Nursing notes reviewed no history of anesthetic complications:   Airway: Mallampati: III  TM distance: >3 FB   Neck ROM: full  Mouth opening: > = 3 FB Dental: normal exam         Pulmonary:Negative Pulmonary ROS and normal exam  breath sounds clear to auscultation      (-) pneumonia, COPD, asthma, shortness of breath, recent URI, sleep apnea, rhonchi, wheezes, rales, stridor and not a current smoker          Patient did not smoke on day of surgery. Cardiovascular:  Exercise tolerance: good (>4 METS),   (+) hypertension: no interval change,     (-) pacemaker, valvular problems/murmurs, past MI, CAD, CABG/stent, dysrhythmias,  angina,  CHF, orthopnea, PND,  FARIA, murmur, weak pulses,  friction rub, systolic click, carotid bruit,  JVD and peripheral edema    ECG reviewed  Rhythm: regular  Rate: normal           Beta Blocker:  Not on Beta Blocker         Neuro/Psych:   (+) neuromuscular disease:, psychiatric history: stable with treatment   (-) seizures, TIA, CVA, headaches and depression/anxiety            GI/Hepatic/Renal:   (+) GERD:, PUD, hepatitis:, liver disease:,      (-) no renal disease, bowel prep and no morbid obesity       Endo/Other: Negative Endo/Other ROS   (+) no malignancy/cancer. (-) diabetes mellitus, hypothyroidism, hyperthyroidism, blood dyscrasia, arthritis, no electrolyte abnormalities, no malignancy/cancer               Abdominal:           Vascular:     - PVD, DVT and PE. Anesthesia Plan      MAC and general     ASA 3       Induction: intravenous. MIPS: Postoperative opioids intended and Prophylactic antiemetics administered. Anesthetic plan and risks discussed with patient. Plan discussed with CRNA. The patient was counseled at length about the risks of mary Covid-19 during their perioperative period and any recovery window from their procedure.   The patient was made aware that mary Covid-19  may worsen their prognosis for recovering from their procedure  and lend to a higher morbidity and/or mortality risk. All material risks, benefits, and reasonable alternatives including postponing the procedure were discussed. The patient DOES wish to proceed with the procedure at this time.             Sherrill Somers MD   12/29/2020

## 2020-12-29 NOTE — PROGRESS NOTES
Discharge instructions, follow up appts, and prescriptions reviewed with patient. All questions answered to patient satisfaction.

## 2020-12-29 NOTE — CARE COORDINATION
[facility] the patient for home care. They will not be able to do the start of care until Jan 1st or 2nd will follow for needs . John Brady

## 2020-12-30 VITALS
TEMPERATURE: 98 F | WEIGHT: 233.47 LBS | SYSTOLIC BLOOD PRESSURE: 128 MMHG | RESPIRATION RATE: 16 BRPM | HEIGHT: 70 IN | OXYGEN SATURATION: 92 % | DIASTOLIC BLOOD PRESSURE: 75 MMHG | BODY MASS INDEX: 33.42 KG/M2 | HEART RATE: 90 BPM

## 2020-12-30 LAB
ABSOLUTE BANDS #: 0.04 K/UL (ref 0–1)
ABSOLUTE EOS #: 0.04 K/UL (ref 0–0.4)
ABSOLUTE IMMATURE GRANULOCYTE: ABNORMAL K/UL (ref 0–0.3)
ABSOLUTE LYMPH #: 1.16 K/UL (ref 1–4.8)
ABSOLUTE MONO #: 0.3 K/UL (ref 0.1–1.3)
ALBUMIN SERPL-MCNC: 2.3 G/DL (ref 3.5–5.2)
ALBUMIN/GLOBULIN RATIO: ABNORMAL (ref 1–2.5)
ALP BLD-CCNC: 110 U/L (ref 40–129)
ALT SERPL-CCNC: 26 U/L (ref 5–41)
ANION GAP SERPL CALCULATED.3IONS-SCNC: 9 MMOL/L (ref 9–17)
AST SERPL-CCNC: 74 U/L
BANDS: 1 % (ref 0–10)
BASOPHILS # BLD: 0 % (ref 0–2)
BASOPHILS ABSOLUTE: 0 K/UL (ref 0–0.2)
BILIRUB SERPL-MCNC: 2.13 MG/DL (ref 0.3–1.2)
BILIRUBIN DIRECT: 1.19 MG/DL
BILIRUBIN, INDIRECT: 0.94 MG/DL (ref 0–1)
BUN BLDV-MCNC: 4 MG/DL (ref 8–23)
BUN/CREAT BLD: ABNORMAL (ref 9–20)
CALCIUM SERPL-MCNC: 7.8 MG/DL (ref 8.6–10.4)
CHLORIDE BLD-SCNC: 105 MMOL/L (ref 98–107)
CO2: 26 MMOL/L (ref 20–31)
CREAT SERPL-MCNC: 0.4 MG/DL (ref 0.7–1.2)
DIFFERENTIAL TYPE: ABNORMAL
EOSINOPHILS RELATIVE PERCENT: 1 % (ref 0–4)
GFR AFRICAN AMERICAN: >60 ML/MIN
GFR NON-AFRICAN AMERICAN: >60 ML/MIN
GFR SERPL CREATININE-BSD FRML MDRD: ABNORMAL ML/MIN/{1.73_M2}
GFR SERPL CREATININE-BSD FRML MDRD: ABNORMAL ML/MIN/{1.73_M2}
GLOBULIN: ABNORMAL G/DL (ref 1.5–3.8)
GLUCOSE BLD-MCNC: 88 MG/DL (ref 70–99)
HCT VFR BLD CALC: 36.2 % (ref 41–53)
HEMOGLOBIN: 11.8 G/DL (ref 13.5–17.5)
IMMATURE GRANULOCYTES: ABNORMAL %
LYMPHOCYTES # BLD: 27 % (ref 24–44)
MCH RBC QN AUTO: 30.4 PG (ref 26–34)
MCHC RBC AUTO-ENTMCNC: 32.6 G/DL (ref 31–37)
MCV RBC AUTO: 93.2 FL (ref 80–100)
MONOCYTES # BLD: 7 % (ref 1–7)
MORPHOLOGY: ABNORMAL
MORPHOLOGY: ABNORMAL
NRBC AUTOMATED: ABNORMAL PER 100 WBC
PDW BLD-RTO: 16.6 % (ref 11.5–14.9)
PLATELET # BLD: 97 K/UL (ref 150–450)
PLATELET ESTIMATE: ABNORMAL
PMV BLD AUTO: 8.6 FL (ref 6–12)
POTASSIUM SERPL-SCNC: 3.6 MMOL/L (ref 3.7–5.3)
RBC # BLD: 3.89 M/UL (ref 4.5–5.9)
RBC # BLD: ABNORMAL 10*6/UL
SEG NEUTROPHILS: 64 % (ref 36–66)
SEGMENTED NEUTROPHILS ABSOLUTE COUNT: 2.76 K/UL (ref 1.3–9.1)
SODIUM BLD-SCNC: 140 MMOL/L (ref 135–144)
TOTAL PROTEIN: 5.5 G/DL (ref 6.4–8.3)
WBC # BLD: 4.3 K/UL (ref 3.5–11)
WBC # BLD: ABNORMAL 10*3/UL

## 2020-12-30 PROCEDURE — 6370000000 HC RX 637 (ALT 250 FOR IP): Performed by: STUDENT IN AN ORGANIZED HEALTH CARE EDUCATION/TRAINING PROGRAM

## 2020-12-30 PROCEDURE — 6370000000 HC RX 637 (ALT 250 FOR IP): Performed by: INTERNAL MEDICINE

## 2020-12-30 PROCEDURE — 99233 SBSQ HOSP IP/OBS HIGH 50: CPT | Performed by: INTERNAL MEDICINE

## 2020-12-30 PROCEDURE — 90686 IIV4 VACC NO PRSV 0.5 ML IM: CPT | Performed by: INTERNAL MEDICINE

## 2020-12-30 PROCEDURE — 99232 SBSQ HOSP IP/OBS MODERATE 35: CPT | Performed by: INTERNAL MEDICINE

## 2020-12-30 PROCEDURE — 85025 COMPLETE CBC W/AUTO DIFF WBC: CPT

## 2020-12-30 PROCEDURE — 97110 THERAPEUTIC EXERCISES: CPT

## 2020-12-30 PROCEDURE — 6360000002 HC RX W HCPCS: Performed by: INTERNAL MEDICINE

## 2020-12-30 PROCEDURE — 36415 COLL VENOUS BLD VENIPUNCTURE: CPT

## 2020-12-30 PROCEDURE — G0008 ADMIN INFLUENZA VIRUS VAC: HCPCS | Performed by: INTERNAL MEDICINE

## 2020-12-30 PROCEDURE — 80048 BASIC METABOLIC PNL TOTAL CA: CPT

## 2020-12-30 PROCEDURE — 97116 GAIT TRAINING THERAPY: CPT

## 2020-12-30 PROCEDURE — 80076 HEPATIC FUNCTION PANEL: CPT

## 2020-12-30 RX ORDER — PANTOPRAZOLE SODIUM 40 MG/1
40 TABLET, DELAYED RELEASE ORAL
Qty: 30 TABLET | Refills: 3 | Status: SHIPPED | OUTPATIENT
Start: 2020-12-31 | End: 2021-07-19 | Stop reason: ALTCHOICE

## 2020-12-30 RX ORDER — LORAZEPAM 1 MG/1
1 TABLET ORAL EVERY 4 HOURS PRN
Status: DISCONTINUED | OUTPATIENT
Start: 2020-12-30 | End: 2020-12-31 | Stop reason: HOSPADM

## 2020-12-30 RX ORDER — CHLORDIAZEPOXIDE HYDROCHLORIDE 5 MG/1
15 CAPSULE, GELATIN COATED ORAL 3 TIMES DAILY
Qty: 45 CAPSULE | Refills: 0 | Status: SHIPPED | OUTPATIENT
Start: 2020-12-30 | End: 2021-01-04

## 2020-12-30 RX ORDER — LACTULOSE 10 G/15ML
20 SOLUTION ORAL
Status: DISCONTINUED | OUTPATIENT
Start: 2020-12-30 | End: 2020-12-31 | Stop reason: HOSPADM

## 2020-12-30 RX ORDER — NADOLOL 20 MG/1
20 TABLET ORAL DAILY
Qty: 30 TABLET | Refills: 3 | Status: SHIPPED | OUTPATIENT
Start: 2020-12-31 | End: 2021-08-25 | Stop reason: ALTCHOICE

## 2020-12-30 RX ORDER — CHLORDIAZEPOXIDE HYDROCHLORIDE 5 MG/1
15 CAPSULE, GELATIN COATED ORAL 3 TIMES DAILY
Status: DISCONTINUED | OUTPATIENT
Start: 2020-12-30 | End: 2020-12-31 | Stop reason: HOSPADM

## 2020-12-30 RX ORDER — LORAZEPAM 2 MG/ML
1 INJECTION INTRAMUSCULAR EVERY 6 HOURS PRN
Status: DISCONTINUED | OUTPATIENT
Start: 2020-12-30 | End: 2020-12-30

## 2020-12-30 RX ORDER — LORAZEPAM 1 MG/1
1 TABLET ORAL EVERY 4 HOURS PRN
Qty: 30 TABLET | Refills: 0 | Status: CANCELLED | OUTPATIENT
Start: 2020-12-30 | End: 2021-01-04

## 2020-12-30 RX ORDER — LACTULOSE 10 G/15ML
10 SOLUTION ORAL EVERY 6 HOURS
Qty: 1800 ML | Refills: 0 | Status: SHIPPED | OUTPATIENT
Start: 2020-12-30 | End: 2021-01-29

## 2020-12-30 RX ORDER — FLUOXETINE HYDROCHLORIDE 20 MG/1
20 CAPSULE ORAL DAILY
Status: DISCONTINUED | OUTPATIENT
Start: 2020-12-30 | End: 2020-12-31 | Stop reason: HOSPADM

## 2020-12-30 RX ORDER — CHLORDIAZEPOXIDE HYDROCHLORIDE 25 MG/1
25 CAPSULE, GELATIN COATED ORAL 3 TIMES DAILY
Status: DISCONTINUED | OUTPATIENT
Start: 2020-12-30 | End: 2020-12-30

## 2020-12-30 RX ADMIN — LORAZEPAM 1 MG: 2 INJECTION INTRAMUSCULAR; INTRAVENOUS at 02:06

## 2020-12-30 RX ADMIN — RIFAXIMIN 550 MG: 550 TABLET ORAL at 21:25

## 2020-12-30 RX ADMIN — LORAZEPAM 1 MG: 1 TABLET ORAL at 11:50

## 2020-12-30 RX ADMIN — INFLUENZA A VIRUS A/VICTORIA/2454/2019 IVR-207 (H1N1) ANTIGEN (PROPIOLACTONE INACTIVATED), INFLUENZA A VIRUS A/HONG KONG/2671/2019 IVR-208 (H3N2) ANTIGEN (PROPIOLACTONE INACTIVATED), INFLUENZA B VIRUS B/VICTORIA/705/2018 BVR-11 ANTIGEN (PROPIOLACTONE INACTIVATED), INFLUENZA B VIRUS B/PHUKET/3073/2013 BVR-1B ANTIGEN (PROPIOLACTONE INACTIVATED) 0.5 ML: 15; 15; 15; 15 INJECTION, SUSPENSION INTRAMUSCULAR at 17:39

## 2020-12-30 RX ADMIN — LORAZEPAM 1 MG: 2 INJECTION INTRAMUSCULAR; INTRAVENOUS at 01:02

## 2020-12-30 RX ADMIN — RIFAXIMIN 550 MG: 550 TABLET ORAL at 15:13

## 2020-12-30 RX ADMIN — POTASSIUM & SODIUM PHOSPHATES POWDER PACK 280-160-250 MG 250 MG: 280-160-250 PACK at 17:13

## 2020-12-30 RX ADMIN — CHLORDIAZEPOXIDE HYDROCHLORIDE 15 MG: 5 CAPSULE ORAL at 21:25

## 2020-12-30 RX ADMIN — ATORVASTATIN CALCIUM 20 MG: 20 TABLET, FILM COATED ORAL at 21:25

## 2020-12-30 NOTE — PROGRESS NOTES
Patient previously under surveillance by telesitter due to pulling out IV lines. Patient has not been aggressive or threatening toward staff and has no intention of self-harm or harming others. Telesitter discontinued as of 12 PM 12/30/2020.

## 2020-12-30 NOTE — DISCHARGE SUMMARY
with gastroenterology in the outpatient setting. Patient did suffer delirium tremens due to alcohol withdrawal during his stay and required CIWA protocol and Librium. Patient was discharged with 15 mg Librium 3 times daily to be managed and titrated down at his external care facility. Patient is not to be discharged home with benzodiazepines due to risk of continued alcohol consumption in the outpatient setting. Patient also suffered from hyperammonemia during his stay and was placed on rifaximin and lactulose which will be continued after discharge. Significant therapeutic interventions:     Significant Diagnostic Studies:   Labs / Micro:  CBC:   Lab Results   Component Value Date    WBC 4.3 12/30/2020    RBC 3.89 12/30/2020    RBC 4.75 04/19/2012    HGB 11.8 12/30/2020    HCT 36.2 12/30/2020    MCV 93.2 12/30/2020    MCH 30.4 12/30/2020    MCHC 32.6 12/30/2020    RDW 16.6 12/30/2020    PLT 97 12/30/2020     04/19/2012     BMP:    Lab Results   Component Value Date    GLUCOSE 88 12/30/2020    GLUCOSE 65 04/19/2012     12/30/2020    K 3.6 12/30/2020     12/30/2020    CO2 26 12/30/2020    ANIONGAP 9 12/30/2020    BUN 4 12/30/2020    CREATININE 0.40 12/30/2020    BUNCRER NOT REPORTED 12/30/2020    CALCIUM 7.8 12/30/2020    LABGLOM >60 12/30/2020    GFRAA >60 12/30/2020    GFR      12/30/2020    GFR NOT REPORTED 12/30/2020       Radiology:    Us Liver    Result Date: 12/24/2020  EXAMINATION: ULTRASOUND OF THE LIVER 12/24/2020 10:47 am COMPARISON: February 26, 2018 HISTORY: ORDERING SYSTEM PROVIDED HISTORY: elevated lft hx hep c TECHNOLOGIST PROVIDED HISTORY: elevated lft hx hep c Acuity: Acute Type of Exam: Initial FINDINGS: Pancreas is not visualized. Increased heterogeneous liver echotexture with mild nodular contour of the liver. Portal vein is patent with hepatopetal flow. No right-sided hydronephrosis. Cholelithiasis and gallbladder sludge. No gallbladder wall thickening.   No pericholecystic fluid. Normal common bile duct measuring 4 mm. Hepatic cirrhosis. Cholelithiasis and gallbladder sludge without sonographic findings to suggest acute cholecystitis. Xr Chest Portable    Result Date: 12/24/2020  EXAMINATION: ONE XRAY VIEW OF THE CHEST 12/24/2020 2:50 pm COMPARISON: None. HISTORY: ORDERING SYSTEM PROVIDED HISTORY: AMS TECHNOLOGIST PROVIDED HISTORY: AMS Reason for Exam: AMS Acuity: Acute Type of Exam: Initial FINDINGS: A single frontal view of the chest was obtained. There is no acute skeletal abnormality. The lung volumes are low. The heart size is enlarged. The mediastinal contours are accentuated by low lung volumes, and are felt to be within normal limits. There are scattered calcified granulomata. There is pulmonary vascular congestion, without overt edema. No focus of acute airspace consolidation is identified. There is no evidence of a pneumothorax. 1. Low lung volumes, without acute airspace consolidation. 2. Pulmonary vascular congestion, without overt edema. 3. Cardiomegaly. Fluoro For Surgical Procedures    Result Date: 12/7/2020  Radiology exam is complete. No Radiologist dictation. Please follow up with ordering provider. Consultations:    Consults:     Final Specialist Recommendations/Findings:   IP CONSULT TO INTERNAL MEDICINE  IP CONSULT TO GI  IP CONSULT TO SOCIAL WORK      The patient was seen and examined on day of discharge and this discharge summary is in conjunction with any daily progress note from day of discharge. Discharge plan:       Disposition:  To a non-[facility] facility    Physician Follow Up:     MD [address], [place] [address]  [address] [facility] [address]  [phone]    Schedule an appointment as soon as possible for a visit in 2 weeks      MASSIMO Marinelli - CNP  [address]  [address] [facility] [address]  [phone]    Schedule an appointment as soon as possible for a visit in 1 week         Requiring capsule  Commonly known as: ERGOCALCIFEROL  take 1 capsule by mouth every week           Where to Get Your Medications      These medications were sent to [facility] [address] [facility]  [address] [place], [address]    Phone: [phone]   · lactulose 10 GM/15ML solution  · nadolol 20 MG tablet  · pantoprazole 40 MG tablet  · rifaximin 550 MG tablet     You can get these medications from any pharmacy    Bring a paper prescription for each of these medications  · chlordiazePOXIDE 5 MG capsule         Time Spent on discharge is  33 mins in patient examination, evaluation, counseling as well as medication reconciliation, prescriptions for required medications, discharge plan and follow up. Electronically signed by   Javier Berkowitz MD  12/30/2020  3:26 PM      Thank you MASSIMO Brannon - CARMEL for the opportunity to be involved in this patient's care.

## 2020-12-30 NOTE — PLAN OF CARE
Problem: Falls - Risk of:  Goal: Will remain free from falls  12/30/2020 0429 by Elle Martinez RN  Outcome: Ongoing  Note: Pt remains free from falls this shift. Bed in lowest position with wheels locked and 2/4 siderails up. Nonskid socks on. Call light and bedside table within reach. Bed alarm activated. Telesitter initiated for pt safety. Will continue to monitor.    12/29/2020 1823 by Augustus Prabhakar RN  Outcome: Ongoing  12/29/2020 1813 by Augustus Prabhakar RN  Outcome: Ongoing  Goal: Absence of physical injury  12/30/2020 0429 by Elle Martinez RN  Outcome: Ongoing  12/29/2020 1823 by Augustus Prabhakar RN  Outcome: Ongoing  12/29/2020 1813 by Augustus Prabhakar RN  Outcome: Ongoing     Problem: Skin Integrity:  Goal: Will show no infection signs and symptoms  12/30/2020 0429 by Elle Martinez RN  Outcome: Ongoing  12/29/2020 1823 by Augustus Prabhakar RN  Outcome: Ongoing  12/29/2020 1813 by Augustus Prabhakar RN  Outcome: Ongoing  Goal: Absence of new skin breakdown  12/30/2020 0429 by Elle Martinez RN  Outcome: Ongoing  12/29/2020 1823 by Augustus Prabhakar RN  Outcome: Ongoing  12/29/2020 1813 by Augustus Prabhakar RN  Outcome: Ongoing     Problem: Musculor/Skeletal Functional Status  Goal: Highest potential functional level  12/30/2020 0429 by Elle Martinez RN  Outcome: Ongoing  12/29/2020 1823 by Augustus Prabhakar RN  Outcome: Ongoing  12/29/2020 1813 by Augustus Prabhakar RN  Outcome: Ongoing  Goal: Absence of falls  12/30/2020 0429 by Elle Martinez RN  Outcome: Ongoing  12/29/2020 1823 by Augustus Prabhakar RN  Outcome: Ongoing  12/29/2020 1813 by Augustus Prabhakar RN  Outcome: Ongoing     Problem: Discharge Planning:  Goal: Discharged to appropriate level of care  12/30/2020 0429 by Elle Martinez RN  Outcome: Ongoing  12/29/2020 1823 by Augustus Prabhakar RN  Outcome: Ongoing  12/29/2020 1813 by Margurette Pop, RN  Outcome: Ongoing     Problem: Fluid Volume - Deficit:  Goal: Absence of fluid volume deficit signs and symptoms  12/30/2020 0429 by Sarah Yu Mitra Taylor RN  Outcome: Ongoing  12/29/2020 1823 by Cat Le RN  Outcome: Ongoing  12/29/2020 1813 by Cat Le RN  Outcome: Ongoing     Problem: Nutrition Deficit:  Goal: Ability to achieve adequate nutritional intake will improve  12/30/2020 0429 by Luis Alfaro RN  Outcome: Ongoing  12/29/2020 1823 by Cat Le RN  Outcome: Ongoing  12/29/2020 1813 by Cat Le RN  Outcome: Ongoing     Problem: Sleep Pattern Disturbance:  Goal: Appears well-rested  12/30/2020 0429 by Luis Alfaro RN  Outcome: Ongoing  12/29/2020 1823 by Cat Le RN  Outcome: Ongoing  12/29/2020 1813 by aCt Le RN  Outcome: Ongoing     Problem: Violence - Risk of, Self/Other-Directed:  Goal: Knowledge of developmental care interventions  12/30/2020 0429 by Luis Alfaro RN  Outcome: Ongoing  12/29/2020 1823 by Cat Le RN  Outcome: Ongoing  12/29/2020 1813 by Cat Le RN  Outcome: Ongoing     Problem: Nutrition  Goal: Optimal nutrition therapy  12/30/2020 0429 by Luis Alfaro RN  Outcome: Ongoing  12/29/2020 1823 by Cat Le RN  Outcome: Ongoing  12/29/2020 1813 by Cat Le RN  Outcome: Ongoing

## 2020-12-30 NOTE — PROGRESS NOTES
Pt attempting to climb out of bed again. RN in to pt room. Pt brief soaked. RN and another staff member attempted to clean pt up at this time. Pt refusing saying \"get the fuck out of here\" and \"don't touch me goodbye\" RN sent [employer] message to Alex Burk CNP again regarding pt agitation. Will continue to monitor.

## 2020-12-30 NOTE — PROGRESS NOTES
Shelbie Owen, CNP, gave orders for a stat ammonia draw at this time. Gaby Weiner will be up to see pt shortly.

## 2020-12-30 NOTE — PROGRESS NOTES
RN in to give pt PM medications. Pt refusing at this time telling RN \"go away I don't need any of your medication, good bye! \" RN gave IV ativan at this time. Will try to give PO medications again in 1 hour.

## 2020-12-30 NOTE — PROGRESS NOTES
THE [facility]   Progress Note    Sukhdeep Wade is a 63 y.o. male patient. Hospitalization Day:7      Chief consult reason:   Cirrhosis  Hematemesis  Alcohol withdrawl    Subjective:  Pt seen and examined. Pt became uncooperative last night, acted out towards staff. This am he is again uncooperative, has tella sitter in room. Pt had EGD yesterday that revealed severe portal HTN gastropathy-bx taken     VITALS:  BP (!) 142/74   Pulse 58   Temp 97.9 °F (36.6 °C) (Oral)   Resp 16   Ht 5' 10\" (1.778 m)   Wt 233 lb 7.5 oz (105.9 kg)   SpO2 99%   BMI 33.50 kg/m²   TEMPERATURE:  Current - Temp: 97.9 °F (36.6 °C); Max - Temp  Av °F (36.7 °C)  Min: 97.5 °F (36.4 °C)  Max: 98.7 °F (37.1 °C)    Physical Assessment:  General appearance:  Uncooperative  Mental Status:  oriented to name, place, date  Lungs:  DM in bases  Heart:  regular rate and rhythm, no murmur  Abdomen:  soft, nontender, nondistended, normal bowel sounds, no masses, hepatomegaly, splenomegaly  Extremities:  no edema, redness, tenderness in the calves  Skin:  no gross lesions, rashes, induration    Data Review:    Labs and Imaging:     CBC:  Recent Labs     20  04020  0423 20  0741   WBC 4.2 3.6 4.3   HGB 11.4* 10.9* 11.8*   MCV 94.9 93.6 93.2   RDW 16.0* 16.3* 16.6*   PLT 76* 80* 97*       ANEMIA STUDIES:  No results for input(s): LABIRON, TIBC, FERRITIN, BRNSNFAL02, FOLATE, OCCULTBLD in the last 72 hours.     BMP:  Recent Labs     20  04020  0423 20  1600 20  0741    140  --  140   K 3.8 2.9* 3.1* 3.6*    105  --  105   CO2 24 27  --  26   BUN 8 5*  --  4*   CREATININE 0.50* 0.48*  --  0.40*   GLUCOSE 104* 104*  --  88   CALCIUM 7.7* 8.0*  --  7.8*       LFTS:  Recent Labs     20  0407 20  0423 20  0741   ALKPHOS 115 113 110   ALT 25 28 26   AST 88* 85* 74*   BILITOT 2.45* 1.96* 2.13*   BILIDIR 1.53* 1.23* 1.19*   LABALBU 2.1* 2.2* 2.3* Amylase/Lipase and Ammonia:  Recent Labs     12/29/20 2052   AMMONIA 27       Acute Hepatitis Panel:  Lab Results   Component Value Date    HEPBSAG NONREACTIVE 12/23/2020    HEPCAB REACTIVE 12/23/2020    HEPBIGM NONREACTIVE 12/23/2020    HEPAIGM NONREACTIVE 12/23/2020       HCV Genotype:  Lab Results   Component Value Date    HEPATITISCGENOTYPE Mixed 09/06/2016       HCV Quantitative:  Lab Results   Component Value Date    HCVQNT NOT REPORTED 09/06/2016       LIVER WORK UP:    AFP  Lab Results   Component Value Date    AFP 3.4 12/23/2020       Alpha 1 antitrypsin   Lab Results   Component Value Date    A1A 178 07/21/2016       MARYSOL  Lab Results   Component Value Date    MARYSOL NEGATIVE 07/21/2016       AMA  Lab Results   Component Value Date    MITOAB 2.6 07/21/2016       ASMA  No results found for: SMOOTHMUSCAB    PT/INR  No results for input(s): PROTIME, INR in the last 72 hours. Cancer Markers:  CEA:  No results for input(s): CEA in the last 72 hours. Ca 125:  No results for input(s):  in the last 72 hours. Ca 19-9:   Invalid input(s):   AFP:   No results for input(s): AFP in the last 72 hours. Lactic acid:Invalid input(s): LACTIC ACID    Radiology Review:    Us Liver    Result Date: 12/24/2020  EXAMINATION: ULTRASOUND OF THE LIVER 12/24/2020 10:47 am COMPARISON: February 26, 2018 HISTORY: ORDERING SYSTEM PROVIDED HISTORY: elevated lft hx hep c TECHNOLOGIST PROVIDED HISTORY: elevated lft hx hep c Acuity: Acute Type of Exam: Initial FINDINGS: Pancreas is not visualized. Increased heterogeneous liver echotexture with mild nodular contour of the liver. Portal vein is patent with hepatopetal flow. No right-sided hydronephrosis. Cholelithiasis and gallbladder sludge. No gallbladder wall thickening. No pericholecystic fluid. Normal common bile duct measuring 4 mm. Hepatic cirrhosis. Cholelithiasis and gallbladder sludge without sonographic findings to suggest acute cholecystitis. Principal Problem:    Acute gastrointestinal bleeding  Active Problems:    GERD (gastroesophageal reflux disease)    DTs (delirium tremens) (HCC)    Hypomagnesemia    Cirrhosis (HCC)    Hypokalemia    Essential hypertension    Elevated LFTs    Thrombocytopenia (HCC)    Hepatitis C virus infection resolved after antiviral drug therapy    Gastrointestinal hemorrhage with melena    Alcohol abuse    Altered mental status    Hypocalcemia    Hypophosphatemia    UTI (urinary tract infection)  Resolved Problems:    * No resolved hospital problems. *       GI Impression:    1. Philly Shine yesterday that revealed severe portal HTN gastropathy-bx taken   2. Alcohol misuse disorder  3. Alcohol with drawls-appears to be at baseline  4. Hematemesis-resolved  5. Melena-resolved  6. HX treated Hep C      Plan and Recommendations:    1. Protonix 40 po daily  2. Cont Xifaxan 550 mg PO BID for encephalopathy   3. Soft diet with supplements  4. Daily Labs including CBC, BMP, LFT's INR  5. Strict I/O's  6. Cont Aldactone 50 mg po daily   7. Lactulose 30 gr hourly until pt had BM. Then Q 6 hrs-titrate to 3-4 bm's daily  8. Will follow-please call with any questions or concerns      This plan was formulated in collaboration with Dr. Elgin Woodruff MD    Thank you for allowing me to participate in the care of your patient. Please feel free to contact me with any questions or concerns. [facility]  [phone]    Attending Physician Statement  I have discussed the care of Diogo Gtz and   I have examined the patient myselft independently, and taken ros and hpi , including pertinent history and exam findings,  with the author of this note . I have reviewed the key elements of all parts of the encounter with the nurse practitioner/resident.     I agree with the assessment, plan and orders as documented by the above health care provider     Aggressive lactulose treatment  No sign of bleeding    Electronically signed by Bouchra Zaldivar MD

## 2020-12-30 NOTE — PROGRESS NOTES
Patient agitated and pulled out his IV. Resident rounds on patient and stated there is no need to restart IV, but  to attempt to administer medications orally.

## 2020-12-30 NOTE — PROGRESS NOTES
Pt attempting to get out of bed. RN and other staff member in to pt room. Pt becoming verbally aggressive telling us to \"get the fuck away from me\" and \"go help yourself\" Pt threw urinal at staff at this time. RN called security and sent [employer] message to Manuel Venegas CNP at this time.

## 2020-12-30 NOTE — PROGRESS NOTES
[facility] [employer]    PROGRESS NOTE             12/30/2020    9:47 AM    Name:   Hilary Taylor  MRN:     [id number]     Acct:      [de-identified]   Room:   2059/2059-01  IP Day:  7  Admit Date:  12/23/2020 10:57 AM    PCP:  MASSIMO Dodson CNP  Code Status:  Full Code    Subjective:     C/C:   Chief Complaint   Patient presents with    Hematemesis    Weight Loss     Interval History Status: worsened. Patient seen and examined at bedside this morning. Patient is agitated and confused alert and oriented x1. Fluctuating mental status consistent with delirium. Patient would like to return home this afternoon; however, patient did not have decision-making capacity at time of examination. We will reevaluate discharge plan at this time. Review of Systems:     Review of Systems   Constitutional: Negative for activity change, appetite change, chills, diaphoresis and fever. Respiratory: Negative for apnea, cough, chest tightness, shortness of breath and wheezing.    Cardiovascular: Negative for chest pain and palpitations. Gastrointestinal: Negative for abdominal distention, abdominal pain, constipation, diarrhea, nausea and vomiting. Genitourinary: Negative for difficulty urinating, dysuria and frequency. Musculoskeletal: Negative for arthralgias and myalgias. Neurological: Negative for dizziness, light-headedness and headaches. Psychiatric/Behavioral: Patient states that he is agitated at nursing staff and believes that he is in a hotel    Medications:      Allergies:  No Known Allergies    Current Meds:   Scheduled Meds:    FLUoxetine  20 mg Oral Daily    nadolol  20 mg Oral Daily    chlordiazePOXIDE  15 mg Oral TID    lactulose  20 g Oral TID    pantoprazole  40 mg Oral QAM AC    rifaximin  550 mg Oral TID    tamsulosin  0.4 mg Oral Daily    vitamin D  50,000 Units Oral Weekly    phosphorus  250 mg Oral BID    atorvastatin  20 mg Oral Nightly    spironolactone  50 mg Oral Daily    sodium chloride flush  10 mL Intravenous 2 times per day    influenza virus vaccine  0.5 mL Intramuscular Prior to discharge     Continuous Infusions:    sodium chloride 50 mL/hr at 20 1303     PRN Meds: magnesium sulfate, LORazepam, acetaminophen, sodium phosphate IVPB **OR** sodium phosphate IVPB, sodium chloride flush, promethazine **OR** ondansetron, polyethylene glycol, potassium chloride **OR** potassium alternative oral replacement **OR** potassium chloride, labetalol    Data:     Past Medical History:   has a past medical history of Alcohol abuse, Arthritis, Back pain, chronic, BPH (benign prostatic hypertrophy), Cholelithiasis, Cirrhosis ([address] DDD (degenerative disc disease), lumbar, Fatty liver, GERD (gastroesophageal reflux disease), Hep C w/o coma, chronic ([facility] [address] History of colon polyps, [place] (hard of hearing), Hyperlipidemia, Hypertension, Stomach ulcer, Tobacco abuse, Tubular adenoma of colon, and Wears glasses. Social History:   reports that he quit smoking about 3 years ago. He has a 45.00 pack-year smoking history. He has never used smokeless tobacco. He reports that he does not drink alcohol or use drugs. Family History:   Family History   Problem Relation Age of Onset   [facility] Cancer Mother         pancreatic    Cancer Father         bone    Diabetes Sister     Asthma Brother        Vitals:  BP (!) 142/74   Pulse 58   Temp 97.9 °F (36.6 °C) (Oral)   Resp 16   Ht 5' 10\" (1.778 m)   Wt 233 lb 7.5 oz (105.9 kg)   SpO2 99%   BMI 33.50 kg/m²   Temp (24hrs), Av.1 °F (36.7 °C), Min:97.5 °F (36.4 °C), Max:98.7 °F (37.1 °C)    No results for input(s): POCGLU in the last 72 hours. I/O(24Hr):     Intake/Output Summary (Last 24 hours) at 2020 0942  Last data filed at 2020 0612  Gross per 24 hour   Intake 1224.17 ml   Output 1025 ml   Net 199.17 ml       Labs:    [unfilled]    Lab Results follow up with ordering provider. Physical Examination:        Physical Exam  Vitals signs reviewed. Constitutional:       General: He is not in acute distress.     Appearance: Normal appearance. He is obese. HENT:      Head: Normocephalic. Neck:      Musculoskeletal: Normal range of motion. Cardiovascular:      Rate and Rhythm: Normal rate and regular rhythm.      Pulses: Normal pulses.      Heart sounds: Normal heart sounds. Pulmonary:      Effort: Pulmonary effort is normal. No respiratory distress.      Breath sounds: Normal breath sounds.      Comments: On nasal cannula 2 L/min. Abdominal:      General: Bowel sounds are normal. There is no distension.      Palpations: Abdomen is soft.      Tenderness: There is no abdominal tenderness. There is no guarding. Musculoskeletal: Normal range of motion. Skin:     General: Skin is warm. Neurological:      General: No focal deficit present.      Mental Status: He is alert and oriented to person, place, and time. Psychiatric: Velvet Carlton and Affect: Patient is agitated and cursing at medical staff.        Assessment:        Primary Problem  Acute gastrointestinal bleeding    Active Hospital Problems    Diagnosis Date Noted    Hypocalcemia [E83.51] 12/26/2020    Hypophosphatemia [E83.39] 12/26/2020    UTI (urinary tract infection) [N39.0] 12/26/2020    Gastrointestinal hemorrhage with melena [K92.1]     Alcohol abuse [F10.10]     Altered mental status [R41.82]     Acute gastrointestinal bleeding [K92.2] 12/23/2020    Thrombocytopenia ([facility] [address] [D69.6] 12/23/2020    Hepatitis C virus infection resolved after antiviral drug therapy [Z86.19] 12/23/2020    Elevated LFTs [R79.89] 08/12/2020    Essential hypertension [I10] 04/24/2019    Hypokalemia [E87.6] 02/04/2019    Cirrhosis ([facility] [address] [K74.60]     Hypomagnesemia [E83.42]     DTs (delirium tremens) ([facility] [address] [F10.231] 07/20/2016    GERD (gastroesophageal reflux disease) [K21.9] 04/19/2012 Plan:         Liver cirrhosis 2/2 hepatitis C  -, ALT 28, alk phos 201, bilirubin 5.57 on presentation  -AST 96, ALT 27, alk phos 127, bilirubin 2.81-improving  -History of hep C, positive hepatitis C antibody, treated with Harvoni  -Ultrasound on February 2018 significant for findings of cirrhosis, no focal liver lesion identified  -Ultrasound liver hepatic cirrhosis with cholelithiasis and gallbladder sludge, no evidence of acute cholecystitis  -AFP 3.4  -Aldactone 50 mg p.o. daily  -Lactulose 20 g 3 times daily  -Xifaxan 550 3 times daily     Alcohol abuse  -Thiamine 100 mg p.o. daily   -Folic acid 1 mg p.o. daily  -Folic ZBKD 4.5 and W91 1321  -Fall precautions  -Seizure precautions  - Librium 15mg TID     Hypokalemia (resolved)  -Potassium 3.5  -Potassium placement protocol, oral K given this morning    Acute GI bleed likely secondary to esophageal varices (resolved)  -Hemoglobin 11.8  -Platelets 97  -Patient has history of liver cirrhosis  -EGD displaying  -INR 1.5, PT 18.3, PTT 37.9  -IV Rocephin for SBP prophylaxis discontinued  -GI consulted and following.     Hypomagnesemia (resolved)  -Magnesium 2.1  - received 2g IV on 12/29/2020    Inez Henson MD  12/30/2020  9:47 AM

## 2020-12-30 NOTE — PROGRESS NOTES
SW sent referral to [facility] per pt request. Facility will not accept pt. SW was able to secure a bed at [facility]. SW gave pt the address and phone number for family. Transport set for 2115 via [facility]. 7000 completed. Orders faxed.

## 2020-12-31 LAB
CULTURE: NORMAL
Lab: NORMAL
SPECIMEN DESCRIPTION: NORMAL

## 2020-12-31 NOTE — PROGRESS NOTES
Physical Therapy  Facility/Department: [facility] MED SURG  Daily Treatment Note  NAME: Dick Eng  : 1960  MRN: [id number]    Date of Service: 2020    Discharge Recommendations:  Patient would benefit from continued therapy after discharge        Assessment   Body structures, Functions, Activity limitations: Decreased functional mobility ; Decreased safe awareness;Decreased endurance;Decreased balance;Decreased strength;Decreased posture;Decreased coordination  Treatment Diagnosis: Impaired mobility  Prognosis: Good  Barriers to Learning: Cognition  REQUIRES PT FOLLOW UP: Yes     Patient Diagnosis(es): The primary encounter diagnosis was Gastrointestinal hemorrhage with melena. A diagnosis of Alcohol abuse was also pertinent to this visit. has a past medical history of Alcohol abuse, Arthritis, Back pain, chronic, BPH (benign prostatic hypertrophy), Cholelithiasis, Cirrhosis ([address] DDD (degenerative disc disease), lumbar, Fatty liver, GERD (gastroesophageal reflux disease), Hep C w/o coma, chronic ([address] History of colon polyps, [place] (hard of hearing), Hyperlipidemia, Hypertension, Stomach ulcer, Tobacco abuse, Tubular adenoma of colon, and Wears glasses. has a past surgical history that includes Bunionectomy; Nasal septum surgery; other surgical history (16); Colonoscopy; Colonoscopy (10/05/2016); other surgical history (2016); other surgical history (2016); knee surgery (Left); Bunionectomy (Left); Endoscopy, colon, diagnostic; pr revise median n/carpal tunnel surg (Right, 2017); Carpal tunnel release (Right); pr revise median n/carpal tunnel surg (Left, 10/31/2017); Colonoscopy (N/A, 3/30/2018); Colonoscopy (2018); pr njx aa&/strd tfrml epi lumbar/sacral 1 level (Bilateral, 2018); other surgical history (2018); pr njx aa&/strd tfrml epi lumbar/sacral 1 level (N/A, 2018); Pain management procedure (Left, 2020);  Pain management procedure (Left, 7/20/2020); Pain management procedure (Bilateral, 8/17/2020); other surgical history (Right, 11/23/2020); Nerve Block (Right, 11/23/2020); Pain management procedure (Bilateral, 12/7/2020); and Upper gastrointestinal endoscopy (N/A, 12/29/2020). Restrictions  Restrictions/Precautions  Restrictions/Precautions: Fall Risk(1:1 sitter)  Subjective   General  Additional Pertinent Hx: Doris Cifuentes is a 63 y.o. male with a past history remarkable for alcoholic cirrhosis, admitted to the hospital on 12/23/2020 for hematemesis. Reports hematemesis and melena for the past 3 days. Diffuse abdominal discomfort with that. He has been heavily drinking recently. He has known alcoholic cirrhosis, GI consulted for GI bleed, plans to do EGD soon. Subjective  Subjective: Pt alone in room upon entry, earlier nursing stated that pt was very agitated. Pt report sleeping poorly during the night. Pt was agreeable with therapy. General Comment  Comments: Pt was very cooperative throughout treatment session. Pain Screening  Patient Currently in Pain: Denies  Pain Assessment  Pain Assessment: 0-10  Pain Level: 0  Vital Signs  Patient Currently in Pain: Denies  Oxygen Therapy  SpO2: 97 %  O2 Device: None (Room air)       Orientation  Orientation  Overall Orientation Status: Impaired  Orientation Level: Oriented to place; Disoriented to time;Oriented to situation  Cognition      Objective   Bed mobility  Rolling to Left: Stand by assistance  Supine to Sit: Minimal assistance(Instructed pt to use bedrail for assist.)  Sit to Supine: Minimal assistance  Scooting: Stand by assistance(scoot to EOB.)  Transfers  Sit to Stand: Contact guard assistance  Stand to sit: Contact guard assistance  Comment: cues for hand placement  Ambulation  Ambulation?: Yes  Ambulation 1  Surface: level tile  Device: Rolling Walker  Assistance: Contact guard assistance(Min A to manuever RW)  Quality of Gait: unsteady gait, small shuffling steps, cues to stay inside the walker. Gait Deviations: Decreased step length;Shuffles  Distance: 30' x 1, 60' x 2  Comments: Cues to stand tall inside walker. Stairs/Curb  Stairs?: No(Not ready today)     Balance  Posture: Fair  Sitting - Static: Good;-  Sitting - Dynamic: Fair;+  Standing - Static: Fair;+(w/RW)  Standing - Dynamic: Fair(w/RW)  Comments: standing with RW  Other exercises  Other exercises?: Yes  Other exercises 1: Bed Mobility  Other exercises 2: Ther ex seated @ EOB both L/E x 10  Other exercises 3: Sit<>Stand x 3(Cues for hand placement.)                        G-Code     OutComes Score                                                     AM-PAC Score             Goals  Short term goals  Time Frame for Short term goals: 7 visits  Short term goal 1: Pt able to perform supine<.sit at [address] term goal 2: Pt able to perform sit to stnd at min A from toilet/chair  Short term goal 3: Pt able to ambulate with RW dist of 100 ft, min A   Short term goal 4: Pt able to tolerate 20 minutes of strengthening ex's/activites to improve fucntion.   Patient Goals   Patient goals : I want to walk better    Plan    Plan  Times per week: 5 to 6 x/week  Safety Devices  Type of devices: Gait belt, Left in bed, Call light within reach, Sitter present, Bed alarm in place, Nurse notified     Therapy Time   Individual Concurrent Group Co-treatment   Time In [facility]         Time Out Devi Lopez PTA   Electronically signed by Maya Wyatt PTA on 12/30/2020 at 7:47 PM

## 2021-01-05 LAB — SURGICAL PATHOLOGY REPORT: NORMAL

## 2021-01-06 ENCOUNTER — TELEPHONE (OUTPATIENT)
Dept: GASTROENTEROLOGY | Age: 61
End: 2021-01-06

## 2021-01-08 ENCOUNTER — TELEPHONE (OUTPATIENT)
Dept: GASTROENTEROLOGY | Age: 61
End: 2021-01-08

## 2021-01-08 ENCOUNTER — VIRTUAL VISIT (OUTPATIENT)
Dept: GASTROENTEROLOGY | Age: 61
End: 2021-01-08
Payer: MEDICARE

## 2021-01-08 DIAGNOSIS — R18.8 CIRRHOSIS OF LIVER WITH ASCITES, UNSPECIFIED HEPATIC CIRRHOSIS TYPE (HCC): ICD-10-CM

## 2021-01-08 DIAGNOSIS — K70.31 ALCOHOLIC CIRRHOSIS OF LIVER WITH ASCITES (HCC): ICD-10-CM

## 2021-01-08 DIAGNOSIS — K74.60 CIRRHOSIS OF LIVER WITH ASCITES, UNSPECIFIED HEPATIC CIRRHOSIS TYPE (HCC): ICD-10-CM

## 2021-01-08 DIAGNOSIS — Z85.028 HISTORY OF STOMACH CANCER: Primary | ICD-10-CM

## 2021-01-08 DIAGNOSIS — Z86.19 HISTORY OF HEPATITIS C: ICD-10-CM

## 2021-01-08 PROCEDURE — 99442 PR PHYS/QHP TELEPHONE EVALUATION 11-20 MIN: CPT | Performed by: INTERNAL MEDICINE

## 2021-01-08 NOTE — TELEPHONE ENCOUNTER
Spoke with MARIAELENA Chu and she would like the patient to be seen on 2/1/21. LVM for patient that we have him scheduled for his follow up on 2/1/21 @ 10:45 am.  Mailing letter as well.

## 2021-01-08 NOTE — PROGRESS NOTES
Abiodun Peck is a 63 y.o. male evaluated via telephone on 1/8/2021. Consent:  He and/or health care decision maker is aware that that he may receive a bill for this telephone service, depending on his insurance coverage, and has provided verbal consent to proceed: Yes      Documentation:  I communicated with the patient and/or health care decision maker about recent EGD procedure and histology results. .   Details of this discussion including any medical advice provided: At present patient appears to be in ECF. Is positive for COVID-19. After discussion, it appears the patient is doing well. He denied GI symptoms. He stated that he has good appetite and there is no abdominal pain, nausea vomiting. No swelling of the lower extremities or abdominal distention. He is following salt restricted diet. His medications reviewed. Recently he was in the hospital that he is at the end of December 2020, and at that time he had a EGD done and was found to have grade 2 esophageal varices and a hiatal hernia and a flat lesion in the hiatal hernia. Biopsies from the lesion is positive for invasive cancer. Histology was reviewed at [address] [place] [address],[address] as well. Patient denies weight loss. No history of melena or hematochezia. He is known to have chronic liver disease/cirrhosis of the liver secondary to alcoholism. His hospital records reviewed. Recommendations:    After COVID-19 infection resolves, patient need to be reevaluated regarding the lesion in the hiatal hernia. Not clear the size of the lesion. Discussed with the patient regarding abnormal histology/cancer.

## 2021-01-13 ENCOUNTER — TELEPHONE (OUTPATIENT)
Dept: GASTROENTEROLOGY | Age: 61
End: 2021-01-13

## 2021-01-13 NOTE — TELEPHONE ENCOUNTER
Received communication from Shereen Dillon RN navigator stating that the patient came up on her path list.  Asking if we are referring him to oncology. Please call her at [phone].

## 2021-01-14 ENCOUNTER — TELEPHONE (OUTPATIENT)
Dept: ONCOLOGY | Age: 61
End: 2021-01-14

## 2021-01-14 NOTE — TELEPHONE ENCOUNTER
Writer spoke with Cleotha Denver. She is asking for an oncology consult, as patient has invasive adenocarcinoma. Writer spoke with [facility] CNP, as there is a note in the chart stating patient should follow up with her. [facility] states she believes Dr Sangita Brito is holding off on oncology consult because he may be able to remove the cancer with another procedure. She asks that Brock Giron (his MA) speak with him and see if we can place the consult while we wait for another GI appt once patient is cleared of COVID. .  Writer spoke with Brock Giron and she states she did talk to Dr Sangita Brito and he says he does not want an oncology consult. Writer then message [facility] again asking her to personally speak with Dr Sangita Brito regarding plan for patient treatment. She states she will talk to him this evening.

## 2021-01-14 NOTE — TELEPHONE ENCOUNTER
After reviewing writers path list, Jessica Payan noted that pt may have an invasive cancer per Dr. Geoffrey Bonilla note. Writer reached out to Gennaro Solomon at [facility]. GI inquiring if pt is being referred to medical oncology for evaluation. Gennaro Solomon will reach out to MARIAELENA Cage for clarification and get back to writer.

## 2021-01-15 ENCOUNTER — TELEPHONE (OUTPATIENT)
Dept: GASTROENTEROLOGY | Age: 61
End: 2021-01-15

## 2021-01-15 NOTE — TELEPHONE ENCOUNTER
Writer received a request for a referral to be sent to oncology for Patient. Writer asked Dr. Ashley Thomas for approval to send. Dr. Ewelina Tuttle stated that the patient is Covid positive and he wants to see the patient first before the patient is sent to oncology. Writer was asked by Erin Murrell to talk to doctor also about the referral.  Dr. Catrina Overton was asked about about the referral and doctor stated to have the PCP send the referral to oncology for the patient.

## 2021-01-22 NOTE — TELEPHONE ENCOUNTER
On 1/12/21 Dr Robert Navarro asked about pt and made sure he was on the schedule. He was informed pt was taken care of. He pulled patient chart and seen Dr Helen Kingsley had seen patient the week prior. He stated he was just wanted to be sure patient was seen. On 1/20/21, Adali Cotton spoke with Dr Robert Navarro again to confirm he did not need to do anything with patient. He stated because patient is established and current with a GI provider, patient should follow with that provider.  was also notified of patient on 1/14/21 after receiving the message from Linden Chao. She was then updated on 1/20/21 after speaking with Dr Rboert Navarro.

## 2021-01-25 PROBLEM — N39.0 UTI (URINARY TRACT INFECTION): Status: RESOLVED | Noted: 2020-12-26 | Resolved: 2021-01-25

## 2021-02-01 ENCOUNTER — OFFICE VISIT (OUTPATIENT)
Dept: GASTROENTEROLOGY | Age: 61
End: 2021-02-01
Payer: MEDICARE

## 2021-02-01 VITALS
TEMPERATURE: 97.4 F | HEIGHT: 70 IN | HEART RATE: 55 BPM | DIASTOLIC BLOOD PRESSURE: 80 MMHG | RESPIRATION RATE: 18 BRPM | BODY MASS INDEX: 30.92 KG/M2 | SYSTOLIC BLOOD PRESSURE: 127 MMHG | WEIGHT: 216 LBS

## 2021-02-01 VITALS — BODY MASS INDEX: 30.92 KG/M2 | WEIGHT: 216 LBS | HEIGHT: 70 IN

## 2021-02-01 DIAGNOSIS — C80.1 ADENOCARCINOMA IN A POLYP (HCC): Primary | ICD-10-CM

## 2021-02-01 DIAGNOSIS — K74.60 CIRRHOSIS OF LIVER WITH ASCITES, UNSPECIFIED HEPATIC CIRRHOSIS TYPE (HCC): ICD-10-CM

## 2021-02-01 DIAGNOSIS — R18.8 CIRRHOSIS OF LIVER WITH ASCITES, UNSPECIFIED HEPATIC CIRRHOSIS TYPE (HCC): ICD-10-CM

## 2021-02-01 PROCEDURE — G8417 CALC BMI ABV UP PARAM F/U: HCPCS | Performed by: NURSE PRACTITIONER

## 2021-02-01 PROCEDURE — 3017F COLORECTAL CA SCREEN DOC REV: CPT | Performed by: NURSE PRACTITIONER

## 2021-02-01 PROCEDURE — 99214 OFFICE O/P EST MOD 30 MIN: CPT | Performed by: NURSE PRACTITIONER

## 2021-02-01 PROCEDURE — 1036F TOBACCO NON-USER: CPT | Performed by: NURSE PRACTITIONER

## 2021-02-01 PROCEDURE — G8427 DOCREV CUR MEDS BY ELIG CLIN: HCPCS | Performed by: NURSE PRACTITIONER

## 2021-02-01 PROCEDURE — G8482 FLU IMMUNIZE ORDER/ADMIN: HCPCS | Performed by: NURSE PRACTITIONER

## 2021-02-01 ASSESSMENT — ENCOUNTER SYMPTOMS
SINUS PRESSURE: 0
BLOOD IN STOOL: 0
DIARRHEA: 0
CONSTIPATION: 0
ABDOMINAL DISTENTION: 0
GASTROINTESTINAL NEGATIVE: 1
FACIAL SWELLING: 0
ALLERGIC/IMMUNOLOGIC NEGATIVE: 1
RHINORRHEA: 0
BACK PAIN: 1
ANAL BLEEDING: 0
TROUBLE SWALLOWING: 0
NAUSEA: 0
VOICE CHANGE: 0
SORE THROAT: 0
SHORTNESS OF BREATH: 0
VOMITING: 0
SINUS PAIN: 0
ABDOMINAL PAIN: 0
WHEEZING: 0
RESPIRATORY NEGATIVE: 1
RECTAL PAIN: 0
CHOKING: 0
COUGH: 0

## 2021-02-01 NOTE — PROGRESS NOTES
GI CLINIC FOLLOW UP    INTERVAL HISTORY:   No referring provider defined for this encounter. Chief Complaint   Patient presents with    Follow-up     Patient is a egd f/u. Patient notes feeling good. Denies new GI symptoms. HISTORY OF PRESENT ILLNESS:     Patient being seen for follow-up. Patient has history of known alcoholic cirrhosis. EGD on 12/29/2020 revealed grade 2 esophageal varices, hiatal hernia, severe portal hypertensive gastropathy, polypoidal flat lesion in the hernia sac. Biopsies from this lesion revealed invasive adenocarcinoma. Patient has recently been on skilled facility for rehabilitation. Also patient was diagnosed with Covid roughly 4 weeks ago. During today's visit patient denies any overt GI bleeding. No nausea, vomiting. He has not had alcohol since his ED admission. No significant ascites, no bilateral lower extremity edema. No encephalopathy. Patient is currently on nadolol 20 mg daily, Protonix 40 mg daily, Aldactone 50 mg daily. Past Medical,Family, and Social History reviewed and does contribute to the patient presentingcondition. Patient's PMH/PSH,SH,PSYCH Hx, MEDs, ALLERGIES, and ROS were all reviewed and updated in the appropriate sections.     PAST MEDICAL HISTORY:  Past Medical History:   Diagnosis Date    Alcohol abuse     6-12 beers a day; quit drinking July 2016    Arthritis     Back pain, chronic     dr. Jade Kanner, orthopedic, every 3-4 months, gets steroid injection    BPH (benign prostatic hypertrophy)     Cholelithiasis     Cirrhosis ([address]     DDD (degenerative disc disease), lumbar     Fatty liver     GERD (gastroesophageal reflux disease)     Hep C w/o coma, chronic (HCC)     History of colon polyps 2016    [place] (hard of hearing)     Hyperlipidemia     Hypertension     Stomach ulcer     hx of    Tobacco abuse     Tubular adenoma of colon 2016, 2018    Wears glasses        Past Surgical History:   Procedure Laterality Date    BUNIONECTOMY      twice on right side    BUNIONECTOMY Left     CARPAL TUNNEL RELEASE Right     COLONOSCOPY      at age 35    COLONOSCOPY  10/05/2016    polyps-pathology tubular adenoma, and abnormal looking mucosa right colon-pathology-tubular adenoma    COLONOSCOPY N/A 3/30/2018    COLONOSCOPY POLYPECTOMY COLD BIOPSY performed by Shirley Davis MD at [facility]  03/30/2018    Small polyp in the sigmoid colon and excised with biopsy forceps--tubular adenoma    ENDOSCOPY, COLON, DIAGNOSTIC      EGD    KNEE SURGERY Left     cyst removed    NASAL SEPTUM SURGERY      NERVE BLOCK Right 11/23/2020    NERVE BLOCK RIGHT CERVICAL STEROID INJECTION  C3-C6 performed by Yessenia Morales MD at [place] [address]  01/04/16    steroid injection C7 T1    OTHER SURGICAL HISTORY  11/21/2016    Bilateral Lumbar CACHORRO L4-L5 injections    OTHER SURGICAL HISTORY  12/19/2016    lumbar steroid injection    OTHER SURGICAL HISTORY  09/28/2018    BILATERAL L5 CACHORRO (N/A Back)    OTHER SURGICAL HISTORY Right 11/23/2020    cervical injection    PAIN MANAGEMENT PROCEDURE Left 7/9/2020    EPIDURAL STEROID INJECTION LEFT L4 L5 performed by Yessenia Morales MD at [facility] Left 7/20/2020    LEFT L4 L5 EPIDURAL STEROID INJECTION performed by Yessenia Morales MD at [address] [place] [address] Bilateral 8/17/2020    LUMBAR FACET BILATERAL L2-L5 performed by Yessenia Morales MD at [address] [place] [address] Bilateral 12/7/2020    NERVE BLOCK BILATERAL LUMBAR MEDIAL BRANCH L2-L5 performed by Yessenia Morales MD at [address] AA&/STRD TFRML EPI LUMBAR/SACRAL 1 LEVEL Bilateral 9/6/2018    BILATERAL L5 CACHORRO performed by Yessenia Morales MD at [address] AA&/STRD TFRML EPI LUMBAR/SACRAL 1 LEVEL N/A 9/28/2018    BILATERAL L5 CACHORRO performed by Yessenia Morales MD at [address] [place] [address] Right 8/29/2017    CARPAL TUNNEL RELEASE RIGHT performed by Ky Austin MD at [address] N/CARPAL TUNNEL SURG Left 10/31/2017    CARPAL TUNNEL RELEASE performed by Ky Austin MD at [address] [facility] N/A 12/29/2020    EGD BIOPSY performed by Adelaida Delatorre MD at [address]:    Current Outpatient Medications:     nadolol (CORGARD) 20 MG tablet, Take 1 tablet by mouth daily, Disp: 30 tablet, Rfl: 3    rifaximin (XIFAXAN) 550 MG tablet, Take 1 tablet by mouth 3 times daily, Disp: 42 tablet, Rfl: 0    pantoprazole (PROTONIX) 40 MG tablet, Take 1 tablet by mouth every morning (before breakfast), Disp: 30 tablet, Rfl: 3    FLUoxetine (PROZAC) 20 MG capsule, take 1 capsule by mouth once daily, Disp: 90 capsule, Rfl: 1    ANDRODERM 4 MG/24HR PT24, APPLY 1 PATCH TO SHOULDER EVERY DAY, Disp: , Rfl:     hydrOXYzine (VISTARIL) 25 MG capsule, take 1 capsule by mouth three times a day if needed for anxiety, Disp: 90 capsule, Rfl: 2    atorvastatin (LIPITOR) 20 MG tablet, take 1 tablet by mouth at bedtime, Disp: 90 tablet, Rfl: 1    spironolactone (ALDACTONE) 50 MG tablet, take 1 tablet by mouth once daily, Disp: 90 tablet, Rfl: 2    vitamin D (ERGOCALCIFEROL) 1.25 MG (22460 UT) CAPS capsule, take 1 capsule by mouth every week, Disp: 12 capsule, Rfl: 1    sildenafil (VIAGRA) 100 MG tablet, Take 1 tablet by mouth as needed for Erectile Dysfunction, Disp: 10 tablet, Rfl: 3    ALLERGIES:   No Known Allergies    FAMILY HISTORY:       Problem Relation Age of Onset    Cancer Mother         pancreatic    Cancer Father         bone    Diabetes Sister     Asthma Brother          SOCIAL HISTORY:   Social History     Socioeconomic History    Marital status: Single     Spouse name: Not on file    Number of children: Not on file    Years of education: Not on file    Highest education level: Not on file   Occupational History    Not on file   Social Needs    Financial resource and voice change. Eyes: Positive for visual disturbance (wears glasses). Respiratory: Negative. Negative for cough, choking, shortness of breath and wheezing. Cardiovascular: Negative. Negative for chest pain, palpitations and leg swelling. Gastrointestinal: Negative. Negative for abdominal distention, abdominal pain, anal bleeding, blood in stool, constipation, diarrhea, nausea, rectal pain and vomiting. Denies   Endocrine: Negative. Negative for polydipsia, polyphagia and polyuria. Genitourinary: Negative for difficulty urinating, dysuria, frequency, hematuria and urgency. Musculoskeletal: Positive for arthralgias, back pain and myalgias. Negative for gait problem, neck pain and neck stiffness. Skin: Negative. Allergic/Immunologic: Negative. Negative for environmental allergies, food allergies and immunocompromised state. Neurological: Negative. Negative for dizziness, tremors, seizures, syncope, facial asymmetry, speech difficulty, weakness, light-headedness, numbness and headaches. Hematological: Negative. Does not bruise/bleed easily. Psychiatric/Behavioral: Negative for agitation, behavioral problems, confusion and sleep disturbance. The patient is nervous/anxious. PHYSICAL EXAMINATION: Vital signs reviewed per the nursing documentation. There were no vitals taken for this visit. There is no height or weight on file to calculate BMI.    Physical Exam      LABORATORY DATA: Reviewed  Lab Results   Component Value Date    WBC 4.3 12/30/2020    HGB 11.8 (L) 12/30/2020    HCT 36.2 (L) 12/30/2020    MCV 93.2 12/30/2020    PLT 97 (L) 12/30/2020     12/30/2020    K 3.6 (L) 12/30/2020     12/30/2020    CO2 26 12/30/2020    BUN 4 (L) 12/30/2020    CREATININE 0.40 (L) 12/30/2020    LABPROT 7.7 04/19/2012    LABALBU 2.3 (L) 12/30/2020    BILITOT 2.13 (H) 12/30/2020    ALKPHOS 110 12/30/2020    AST 74 (H) 12/30/2020    ALT 26 12/30/2020    INR 1.4 12/25/2020 Lab Results   Component Value Date    RBC 3.89 (L) 12/30/2020    HGB 11.8 (L) 12/30/2020    MCV 93.2 12/30/2020    MCH 30.4 12/30/2020    MCHC 32.6 12/30/2020    RDW 16.6 (H) 12/30/2020    MPV 8.6 12/30/2020    BASOPCT 0 12/30/2020    LYMPHSABS 1.16 12/30/2020    MONOSABS 0.30 12/30/2020    NEUTROABS 2.76 12/30/2020    EOSABS 0.04 12/30/2020    BASOSABS 0.00 12/30/2020         DIAGNOSTIC TESTING:     No results found. IMPRESSION: Mr. Sho Troncoso is a 63 y.o. male with    Diagnosis Orders   1. 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 ([facility] [address]  EGD   2. Cirrhosis of liver with ascites, unspecified hepatic cirrhosis type ([facility] [address]  CBC Auto Differential    Comprehensive Metabolic Panel     Patient needs EGD and this will be arranged for tomorrow. Patient had recent Covid infection. No need for testing. Also advised to have CBC and CMP. To continue medications as ordered    The Endoscopic procedure was explained to the patient in detail  The prep and NPO were explained  All the Risks, Benefits, and Alternatives were explained  Risk of Bleeding, Perforation and Cardio Respiratory risks were explained  his questions were answered  The procedure has been scheduled with the  in the office  Patient was asked to give us a call for any questions  The patient has verbalized understanding and agreement to this plan. The patient was counseled at length about the risks of mary Covid-19 during their perioperative period and any recovery window from their procedure. The patient was made aware that mary Covid-19  may worsen their prognosis for recovering from their procedure  and lend to a higher morbidity and/or mortality risk. All material risks, benefits, and reasonable alternatives including postponing the procedure were discussed. The patient does wish to proceed with the procedure at this time. Thank you for allowing me to participate in the care of Mr. Sho Troncoso.  For any further questions please do not hesitate to contact me. I have reviewed and agree with the ROS entered by the MA/TODD.          ANNA Sterling    [facility]  Office #: [phone]

## 2021-02-01 NOTE — PROGRESS NOTES
Pre-op Instructions For Out-Patient Surgery    Medication Instructions:  · Please stop herbs and any supplements now (includes vitamins and minerals). · Please contact your surgeon and prescribing physician for pre-op instructions for any blood thinners. · If you have inhalers/aerosol treatments at home, please use them the morning of your surgery and bring the inhalers with you to the hospital.    · Please take the following medications the morning of your surgery with a sip of water:  Nadolol, Xifaxan. Surgery Instructions:  1. After midnight before surgery:  Do not eat or drink anything, including water, mints, gum, and hard candy. You may brush your teeth without swallowing. No smoking, chewing tobacco, or street drugs. 2. Please shower or bathe before surgery. 3. Please do not wear any cologne, lotion, powder, deodorant, jewelry, piercings, perfume, makeup, nail polish, hair accessories, or hair spray on the day of surgery. Wear loose comfortable clothing. 4. Leave your valuables at home. Bring a storage case for any glasses/contacts. 5. An adult who is responsible for you MUST drive you home and should be with you for the first 24 hours after surgery. The Day of Surgery:  · Arrive at [address] Surgery Entrance at the time directed by your surgeon and check in at the desk. · If you have a living will or healthcare power of , please bring a copy. · You will be taken to the pre-op holding area where you will be prepared for surgery. A physical assessment will be performed by a nurse practitioner or house officer. Your IV will be started and you will meet your anesthesiologist.    · We are currently limiting visitors to only one designated person in the pre-op holding area.   When you go to surgery, your family will be directed to the surgical waiting room, where the doctor should speak with them after your surgery. · After surgery, you will be taken to the recovery room then when you are awake and stable you will go to the short stay unit for preparation to be discharged. Only your one designated person is allowed to come to short stay for your discharge. · If you use a Bi-PAP or C-PAP machine, please bring it with you and leave it in the car in case it is needed in recovery room. Instructions read to patient and verbalizes understanding.

## 2021-02-02 ENCOUNTER — HOSPITAL ENCOUNTER (OUTPATIENT)
Age: 61
Setting detail: OUTPATIENT SURGERY
Discharge: HOME | End: 2021-02-02
Attending: INTERNAL MEDICINE | Admitting: INTERNAL MEDICINE
Payer: MEDICARE

## 2021-02-02 ENCOUNTER — HOSPITAL ENCOUNTER (OUTPATIENT)
Dept: PREADMISSION TESTING | Age: 61
Discharge: HOME | End: 2021-02-02

## 2021-02-02 ENCOUNTER — ANESTHESIA EVENT (OUTPATIENT)
Dept: ENDOSCOPY | Age: 61
End: 2021-02-02
Payer: MEDICARE

## 2021-02-02 ENCOUNTER — ANESTHESIA (OUTPATIENT)
Dept: ENDOSCOPY | Age: 61
End: 2021-02-02
Payer: MEDICARE

## 2021-02-02 VITALS
BODY MASS INDEX: 30.92 KG/M2 | WEIGHT: 216 LBS | TEMPERATURE: 97.7 F | DIASTOLIC BLOOD PRESSURE: 81 MMHG | SYSTOLIC BLOOD PRESSURE: 98 MMHG | RESPIRATION RATE: 20 BRPM | OXYGEN SATURATION: 100 % | HEART RATE: 55 BPM | HEIGHT: 70 IN

## 2021-02-02 VITALS — SYSTOLIC BLOOD PRESSURE: 137 MMHG | OXYGEN SATURATION: 97 % | DIASTOLIC BLOOD PRESSURE: 61 MMHG

## 2021-02-02 LAB
SARS-COV-2, RAPID: DETECTED
SARS-COV-2: ABNORMAL
SARS-COV-2: ABNORMAL
SOURCE: ABNORMAL

## 2021-02-02 PROCEDURE — 43236 UPPR GI SCOPE W/SUBMUC INJ: CPT | Performed by: INTERNAL MEDICINE

## 2021-02-02 PROCEDURE — 3700000001 HC ADD 15 MINUTES (ANESTHESIA): Performed by: INTERNAL MEDICINE

## 2021-02-02 PROCEDURE — 6360000002 HC RX W HCPCS: Performed by: NURSE ANESTHETIST, CERTIFIED REGISTERED

## 2021-02-02 PROCEDURE — 2500000003 HC RX 250 WO HCPCS: Performed by: NURSE ANESTHETIST, CERTIFIED REGISTERED

## 2021-02-02 PROCEDURE — 2709999900 HC NON-CHARGEABLE SUPPLY: Performed by: INTERNAL MEDICINE

## 2021-02-02 PROCEDURE — 7100000000 HC PACU RECOVERY - FIRST 15 MIN: Performed by: INTERNAL MEDICINE

## 2021-02-02 PROCEDURE — 43239 EGD BIOPSY SINGLE/MULTIPLE: CPT | Performed by: INTERNAL MEDICINE

## 2021-02-02 PROCEDURE — 88305 TISSUE EXAM BY PATHOLOGIST: CPT

## 2021-02-02 PROCEDURE — 3609012400 HC EGD TRANSORAL BIOPSY SINGLE/MULTIPLE: Performed by: INTERNAL MEDICINE

## 2021-02-02 PROCEDURE — 7100000001 HC PACU RECOVERY - ADDTL 15 MIN: Performed by: INTERNAL MEDICINE

## 2021-02-02 PROCEDURE — 3700000000 HC ANESTHESIA ATTENDED CARE: Performed by: INTERNAL MEDICINE

## 2021-02-02 PROCEDURE — 88342 IMHCHEM/IMCYTCHM 1ST ANTB: CPT

## 2021-02-02 PROCEDURE — 2580000003 HC RX 258: Performed by: ANESTHESIOLOGY

## 2021-02-02 PROCEDURE — U0002 COVID-19 LAB TEST NON-CDC: HCPCS

## 2021-02-02 RX ORDER — SODIUM CHLORIDE 0.9 % (FLUSH) 0.9 %
10 SYRINGE (ML) INJECTION EVERY 12 HOURS SCHEDULED
Status: DISCONTINUED | OUTPATIENT
Start: 2021-02-02 | End: 2021-02-02 | Stop reason: HOSPADM

## 2021-02-02 RX ORDER — LIDOCAINE HYDROCHLORIDE 10 MG/ML
1 INJECTION, SOLUTION EPIDURAL; INFILTRATION; INTRACAUDAL; PERINEURAL
Status: DISCONTINUED | OUTPATIENT
Start: 2021-02-02 | End: 2021-02-02 | Stop reason: HOSPADM

## 2021-02-02 RX ORDER — PROPOFOL 10 MG/ML
INJECTION, EMULSION INTRAVENOUS PRN
Status: DISCONTINUED | OUTPATIENT
Start: 2021-02-02 | End: 2021-02-02 | Stop reason: SDUPTHER

## 2021-02-02 RX ORDER — SODIUM CHLORIDE, SODIUM LACTATE, POTASSIUM CHLORIDE, CALCIUM CHLORIDE 600; 310; 30; 20 MG/100ML; MG/100ML; MG/100ML; MG/100ML
INJECTION, SOLUTION INTRAVENOUS CONTINUOUS
Status: DISCONTINUED | OUTPATIENT
Start: 2021-02-02 | End: 2021-02-02 | Stop reason: HOSPADM

## 2021-02-02 RX ORDER — SODIUM CHLORIDE 0.9 % (FLUSH) 0.9 %
10 SYRINGE (ML) INJECTION PRN
Status: DISCONTINUED | OUTPATIENT
Start: 2021-02-02 | End: 2021-02-02 | Stop reason: HOSPADM

## 2021-02-02 RX ORDER — LIDOCAINE HYDROCHLORIDE 10 MG/ML
INJECTION, SOLUTION EPIDURAL; INFILTRATION; INTRACAUDAL; PERINEURAL PRN
Status: DISCONTINUED | OUTPATIENT
Start: 2021-02-02 | End: 2021-02-02 | Stop reason: SDUPTHER

## 2021-02-02 RX ADMIN — SODIUM CHLORIDE, POTASSIUM CHLORIDE, SODIUM LACTATE AND CALCIUM CHLORIDE: 600; 310; 30; 20 INJECTION, SOLUTION INTRAVENOUS at 10:41

## 2021-02-02 RX ADMIN — PROPOFOL 310 MG: 10 INJECTION, EMULSION INTRAVENOUS at 10:56

## 2021-02-02 RX ADMIN — LIDOCAINE HYDROCHLORIDE 50 MG: 10 INJECTION, SOLUTION EPIDURAL; INFILTRATION; INTRACAUDAL; PERINEURAL at 10:56

## 2021-02-02 ASSESSMENT — PULMONARY FUNCTION TESTS
PIF_VALUE: 1

## 2021-02-02 ASSESSMENT — PAIN SCALES - GENERAL: PAINLEVEL_OUTOF10: 0

## 2021-02-02 NOTE — H&P
HISTORY and [facility] [address]       NAME:  Kelley Kay  MRN: [id number]   YOB: 1960   Date: 2/2/2021   Age: 63 y.o. Gender: male       COMPLAINT AND PRESENT HISTORY:                Kelley Kay is 63 y.o.,  male, undergoing for EGD. Patient has had previous endoscopies done  On 12/29/2020  Patient denies any FH of Esophogeal Cancer. Pt has  history of known alcoholic cirrhosis. According to the result of the EGD on 12/29/2020 pt has grade 2 esophageal varices, hiatal hernia, severe portal hypertensive gastropathy, polypoidal flat lesion in the hernia sac. Biopsies from this lesion revealed invasive adenocarcinoma. Patient  denies  frequent heartburn,  Pt has hx of GERD  Pt is not taking his Protonix  9PPI) for about one month. Patient denies any Dysphagia. Patient denies any epigastric pains, no Nausea/vomiting . No diarrhea / constipation, no changes in the color, caliber or consistency of the stools. Pt denies fever/chills, chest pain , or SOB  Pt denies problem with anesthesia, no hx of MRSA infection   Pt Npo since the past midnight, pt took Aldactone with sip of water. No anticoagulant medication use. PMH:  Hypertension pt on Aldactone. Hyperlipidemia pt on Lipitor. Lab Results  Component Value Date   WBC 4.3 12/30/2020   HGB 11.8 (L) 12/30/2020   HCT 36.2 (L) 12/30/2020   MCV 93.2 12/30/2020   PLT 97 (L) 12/30/2020  Lab Results  Component Value Date    12/30/2020   K 3.6 12/30/2020    12/30/2020   CO2 26 12/30/2020   BUN 4 12/30/2020   CREATININE 0.40 12/30/2020   GLUCOSE 88 12/30/2020   GLUCOSE 65 04/19/2012   CALCIUM 7.8 12/30/2020     US LIVER   Impression  Hepatic cirrhosis.   Cholelithiasis and gallbladder sludge without sonographic findings to suggest  acute cholecystitis.       PAST MEDICAL HISTORY     Past Medical History:   Diagnosis Date    Alcohol abuse     6-12 beers a day; quit drinking July 2016    Arthritis     PROCEDURE Bilateral 2020    LUMBAR FACET BILATERAL L2-L5 performed by Nubia Hernandez MD at [address] Bilateral 2020    NERVE BLOCK BILATERAL LUMBAR MEDIAL BRANCH L2-L5 performed by Nubia Hernandez MD at [address] AA&/STRD TFRML EPI LUMBAR/SACRAL 1 LEVEL Bilateral 2018    BILATERAL L5 CACHORRO performed by Nubia Hernandez MD at [address] AA&/STRD TFRML EPI LUMBAR/SACRAL 1 LEVEL N/A 2018    BILATERAL L5 CACHORRO performed by Nubia Hernandez MD at [facility] N/CARPAL TUNNEL SURG Right 2017    CARPAL TUNNEL RELEASE RIGHT performed by Georgie Jiang MD at [facility] N/CARPAL TUNNEL SURG Left 10/31/2017    CARPAL TUNNEL RELEASE performed by Georgie Jiang MD at [facility] N/A 2020    EGD BIOPSY performed by Ruben Morales MD at [address] [place] [address]       Family History   Problem Relation Age of Onset    Cancer Mother         pancreatic    Cancer Father         bone    Diabetes Sister     Asthma Brother        SOCIAL HISTORY       Social History     Socioeconomic History    Marital status: Single     Spouse name: Not on file    Number of children: Not on file    Years of education: Not on file    Highest education level: Not on file   Occupational History    Not on file   Social Needs    Financial resource strain: Not on file    Food insecurity     Worry: Not on file     Inability: Not on file    Transportation needs     Medical: Not on file     Non-medical: Not on file   Tobacco Use    Smoking status: Former Smoker     Packs/day: 1.00     Years: 45.00     Pack years: 45.00     Quit date: 2017     Years since quittin.0    Smokeless tobacco: Never Used   Substance and Sexual Activity    Alcohol use: No     Comment: 2016 he stopped drinking    Drug use: No     Frequency: 1.0 times per week     Comment: cocaine,  stopped spring 2016    Sexual activity: Yes     Partners: Female   Lifestyle    Physical activity     Days per week: Not on file     Minutes per session: Not on file    Stress: Not on file   Relationships    Social connections     Talks on phone: Not on file     Gets together: Not on file     Attends Buddhism service: Not on file     Active member of club or organization: Not on file     Attends meetings of clubs or organizations: Not on file     Relationship status: Not on file    Intimate partner violence     Fear of current or ex partner: Not on file     Emotionally abused: Not on file     Physically abused: Not on file     Forced sexual activity: Not on file   Other Topics Concern    Not on file   Social History Narrative     in the past, retired           REVIEW OF SYSTEMS      No Known Allergies    No current facility-administered medications on file prior to encounter.       Current Outpatient Medications on File Prior to Encounter   Medication Sig Dispense Refill    nadolol (CORGARD) 20 MG tablet Take 1 tablet by mouth daily 30 tablet 3    rifaximin (XIFAXAN) 550 MG tablet Take 1 tablet by mouth 3 times daily 42 tablet 0    pantoprazole (PROTONIX) 40 MG tablet Take 1 tablet by mouth every morning (before breakfast) 30 tablet 3    FLUoxetine (PROZAC) 20 MG capsule take 1 capsule by mouth once daily 90 capsule 1    ANDRODERM 4 MG/24HR PT24 APPLY 1 PATCH TO SHOULDER EVERY DAY      hydrOXYzine (VISTARIL) 25 MG capsule take 1 capsule by mouth three times a day if needed for anxiety 90 capsule 2    atorvastatin (LIPITOR) 20 MG tablet take 1 tablet by mouth at bedtime 90 tablet 1    spironolactone (ALDACTONE) 50 MG tablet take 1 tablet by mouth once daily 90 tablet 2    vitamin D (ERGOCALCIFEROL) 1.25 MG (92924 UT) CAPS capsule take 1 capsule by mouth every week 12 capsule 1    sildenafil (VIAGRA) 100 MG tablet Take 1 tablet by mouth as needed for Erectile Dysfunction 10 tablet 3       Negative except for what is mentioned in the HPI. GENERAL PHYSICAL EXAM     Vitals:see nursing flow sheet for vital signs. GENERAL APPEARANCE:   Juve Hernández is 63 y.o.,  male,nourished, conscious, alert. Does not appear to be distress or pain at this time. SKIN:  Warm, dry, no cyanosis or jaundice. HEAD:  Normocephalic, atraumatic, no swelling or tenderness. EYES:  Pupils equal, reactive to light. EARS:  No discharge, no marked hearing loss. NOSE:  No rhinorrhea, epistaxis or septal deformity. THROAT:  Not congested. No ulceration bleeding or discharge. NECK:  No stiffness, trachea central.  No palpable masses or L.N.                 CHEST:  Symmetrical and equal on expansion. HEART:  RRR S1 > S2. No audible murmurs or gallops. LUNGS:  Equal on expansion, normal breath sounds. No adventitious sounds. ABDOMEN:   Soft on palpation. No localized tenderness. No guarding or rigidity. No palpable hepatosplenomegaly. No  ascites              LYMPHATICS:  No palpable cervical lymphadenopathy. LOCOMOTOR, BACK AND SPINE:  No tenderness or deformities. EXTREMITIES:  Symmetrical, no pretibial edema. No calf tenderness,  No discoloration or ulcerations. NEUROLOGIC:  The patient is conscious, alert, oriented,No apparent focal sensory or motor deficits.              PROVISIONAL DIAGNOSES / SURGERY:    ADENOCARCINOMA  EGD  Patient Active Problem List    Diagnosis Date Noted    Hypocalcemia 12/26/2020    Hypophosphatemia 12/26/2020    Gastrointestinal hemorrhage with melena     Alcohol abuse     Altered mental status     Acute gastrointestinal bleeding 12/23/2020    Thrombocytopenia ([address] 12/23/2020    Hepatitis C virus infection resolved after antiviral drug therapy 12/23/2020    Cervical facet syndrome 11/23/2020    Lumbar facet joint syndrome 08/17/2020    Elevated LFTs 08/12/2020    Seasonal allergies 08/12/2020    S/P epidural steroid injection 08/05/2020    Essential hypertension 04/24/2019    Recurrent major depressive disorder in partial remission ([facility] [address] 04/24/2019    Pure hypercholesterolemia 02/04/2019    Hypokalemia 02/04/2019    Vitamin D deficiency 09/20/2017    History of hepatitis C 09/11/2017    Ganglion cyst 05/31/2017    Carpal tunnel syndrome of right wrist 05/31/2017    Tinnitus 03/23/2017    Eustachian tube dysfunction 03/23/2017    Lumbar radiculitis 11/08/2016    Lumbar disc herniation 11/08/2016    Gynecomastia, male 10/26/2016    Depression 10/13/2016    Grief 10/13/2016    Chronic bilateral low back pain with left-sided sciatica 10/06/2016    DDD (degenerative disc disease), lumbar 10/06/2016    Hepatic cirrhosis ([address] 09/15/2016    Insomnia 09/14/2016    Cirrhosis (HCC)     Hep C w/o coma, chronic (HCC)     Fatty liver     Calculus of gallbladder without cholecystitis 08/10/2016    Chronic viral hepatitis B without delta agent and without coma ([address] 07/22/2016    Hypomagnesemia     DTs (delirium tremens) ([facility] [address] 07/20/2016    Cervical radicular pain 01/04/2016    Tubular adenoma of colon 01/01/2016    History of colon polyps 01/01/2016    Back pain, chronic 04/19/2012    Hearing difficulty 04/19/2012    GERD (gastroesophageal reflux disease) 04/19/2012           MASSIMO Hadley - CNP on 2/2/2021 at 8:49 AM

## 2021-02-02 NOTE — ANESTHESIA PRE PROCEDURE
 Hypomagnesemia E83.42    Chronic viral hepatitis B without delta agent and without coma (HCC) B18.1    Calculus of gallbladder without cholecystitis K80.20    Hep C w/o coma, chronic (HCC) B18.2    Fatty liver K76.0    Insomnia G47.00    Cirrhosis (HCC) K74.60    Hepatic cirrhosis (HCC) K74.60    Chronic bilateral low back pain with left-sided sciatica M54.42, G89.29    DDD (degenerative disc disease), lumbar M51.36    Depression F32.9    Grief F43.21    Tubular adenoma of colon D12.6    History of colon polyps Z86.010    Gynecomastia, male N58    Lumbar radiculitis M54.16    Lumbar disc herniation M51.26    Tinnitus H93.19    Eustachian tube dysfunction H69.80    Ganglion cyst M67.40    Carpal tunnel syndrome of right wrist G56.01    History of hepatitis C Z86.19    Vitamin D deficiency E55.9    Pure hypercholesterolemia E78.00    Hypokalemia E87.6    Essential hypertension I10    Recurrent major depressive disorder in partial remission (HCC) F33.41    S/P epidural steroid injection Z92.241    Elevated LFTs R79.89    Seasonal allergies J30.2    Lumbar facet joint syndrome M47.816    Cervical facet syndrome M47.812    Acute gastrointestinal bleeding K92.2    Thrombocytopenia (HCC) D69.6    Hepatitis C virus infection resolved after antiviral drug therapy Z86.19    Gastrointestinal hemorrhage with melena K92.1    Alcohol abuse F10.10    Altered mental status R41.82    Hypocalcemia E83.51    Hypophosphatemia E83.39       Past Medical History:        Diagnosis Date    Alcohol abuse     6-12 beers a day; quit drinking July 2016    Arthritis     Back pain, chronic     dr. Yuko Albright, orthopedic, every 3-4 months, gets steroid injection    BPH (benign prostatic hypertrophy)     Cholelithiasis     Cirrhosis ([address]     DDD (degenerative disc disease), lumbar     Fatty liver     GERD (gastroesophageal reflux disease)     [facility]     History of colon polyps 2016    [place] (hard of hearing)     Hyperlipidemia     Hypertension     Stomach ulcer     hx of    Tobacco abuse     Tubular adenoma of colon 2016, 2018    Wears glasses        Past Surgical History:        Procedure Laterality Date    BUNIONECTOMY      twice on right side    BUNIONECTOMY Left     CARPAL TUNNEL RELEASE Right     COLONOSCOPY      at age 35    COLONOSCOPY  10/05/2016    polyps-pathology tubular adenoma, and abnormal looking mucosa right colon-pathology-tubular adenoma    COLONOSCOPY N/A 3/30/2018    COLONOSCOPY POLYPECTOMY COLD BIOPSY performed by Ary Galindo MD at [address] [place] [address]  03/30/2018    Small polyp in the sigmoid colon and excised with biopsy forceps--tubular adenoma    ENDOSCOPY, COLON, DIAGNOSTIC      EGD    KNEE SURGERY Left     cyst removed    NASAL SEPTUM SURGERY      NERVE BLOCK Right 11/23/2020    NERVE BLOCK RIGHT CERVICAL STEROID INJECTION  C3-C6 performed by Joe Wu MD at [place] [address].  01/04/16    steroid injection C7 T1    OTHER SURGICAL HISTORY  11/21/2016    Bilateral Lumbar CACHORRO L4-L5 injections    OTHER SURGICAL HISTORY  12/19/2016    lumbar steroid injection    OTHER SURGICAL HISTORY  09/28/2018    BILATERAL L5 CACHORRO (N/A Back)    OTHER SURGICAL HISTORY Right 11/23/2020    cervical injection    PAIN MANAGEMENT PROCEDURE Left 7/9/2020    EPIDURAL STEROID INJECTION LEFT L4 L5 performed by Joe Wu MD at [place] [address] Left 7/20/2020    LEFT L4 L5 EPIDURAL STEROID INJECTION performed by Joe Wu MD at [place] [address] Bilateral 8/17/2020    LUMBAR FACET BILATERAL L2-L5 performed by Joe Wu MD at [place] [address] Bilateral 12/7/2020    NERVE BLOCK BILATERAL LUMBAR MEDIAL BRANCH L2-L5 performed by Joe Wu MD at [address] AA&/STRD TFRML EPI LUMBAR/SACRAL 1 LEVEL Bilateral 9/6/2018    BILATERAL L5 CACHORRO performed by Roe Phillips Belkis Alfonso MD at [address] AA&/STRD TFRML EPI LUMBAR/SACRAL 1 LEVEL N/A 2018    BILATERAL L5 CACHORRO performed by Jacki Barber MD at [facility] N/CARPAL TUNNEL SURG Right 2017    CARPAL TUNNEL RELEASE RIGHT performed by Alejandro Monterroso MD at [address] [facility] N/CARPAL TUNNEL SURG Left 10/31/2017    CARPAL TUNNEL RELEASE performed by Alejandro Monterroso MD at [address] N/A 2020    EGD BIOPSY performed by Eric David MD at [facility]       Social History:    Social History     Tobacco Use    Smoking status: Former Smoker     Packs/day: 1.00     Years: 45.00     Pack years: 45.00     Quit date: 2017     Years since quittin.0    Smokeless tobacco: Never Used   Substance Use Topics    Alcohol use: No     Comment: 2016 he stopped drinking                                Counseling given: Not Answered      Vital Signs (Current): There were no vitals filed for this visit.                                            BP Readings from Last 3 Encounters:   21 127/80   20 128/75   20 (!) 120/6       NPO Status:                                                                                 BMI:   Wt Readings from Last 3 Encounters:   21 216 lb (98 kg)   20 233 lb 7.5 oz (105.9 kg)   20 225 lb (102.1 kg)     There is no height or weight on file to calculate BMI.    CBC:   Lab Results   Component Value Date    WBC 4.3 2020    RBC 3.89 2020    RBC 4.75 2012    HGB 11.8 2020    HCT 36.2 2020    MCV 93.2 2020    RDW 16.6 2020    PLT 97 2020     2012       CMP:   Lab Results   Component Value Date     2020    K 3.6 2020     2020    CO2 26 2020    BUN 4 2020    CREATININE 0.40 2020    GFRAA >60 2020    LABGLOM >60 2020    GLUCOSE 88 2020    GLUCOSE 65 2012    PROT 5.5 12/30/2020    CALCIUM 7.8 12/30/2020    BILITOT 2.13 12/30/2020    ALKPHOS 110 12/30/2020    AST 74 12/30/2020    ALT 26 12/30/2020       POC Tests: No results for input(s): POCGLU, POCNA, POCK, POCCL, POCBUN, POCHEMO, POCHCT in the last 72 hours. Coags:   Lab Results   Component Value Date    PROTIME 17.5 12/25/2020    INR 1.4 12/25/2020    APTT 37.9 12/23/2020       HCG (If Applicable): No results found for: PREGTESTUR, PREGSERUM, HCG, HCGQUANT     ABGs: No results found for: PHART, PO2ART, JWC5WYZ, TMB8RUK, BEART, W9JWGANH     Type & Screen (If Applicable):  No results found for: LABABO, LABRH    Drug/Infectious Status (If Applicable):  Lab Results   Component Value Date    HEPCAB REACTIVE 12/23/2020       COVID-19 Screening (If Applicable):   Lab Results   Component Value Date    COVID19 DETECTED 02/02/2021    COVID19 Not Detected 07/17/2020         Anesthesia Evaluation  Patient summary reviewed and Nursing notes reviewed no history of anesthetic complications:   Airway: Mallampati: II  TM distance: >3 FB   Neck ROM: full  Mouth opening: > = 3 FB Dental: normal exam         Pulmonary:normal exam  breath sounds clear to auscultation                            ROS comment: COVID +ve - h/o COVID infection in December 2020   Cardiovascular:    (+) hypertension:, hyperlipidemia      ECG reviewed  Rhythm: regular  Rate: normal           Beta Blocker:  Not on Beta Blocker         Neuro/Psych:   (+) neuromuscular disease:, psychiatric history:depression/anxiety              ROS comment: Chronic low back pain   hearing loss (rt ear). GI/Hepatic/Renal:   (+) GERD:, PUD, hepatitis: C and B, liver disease (Cirrhosis): portal hypertension,          ROS comment: esophageal varices. Endo/Other:    (+) blood dyscrasia: anemia and thrombocytopenia, arthritis: OA., malignancy/cancer (?Gastric Adenocarcinoma ).                   ROS comment: Alcohol Abuse Abdominal:           Vascular: Anesthesia Plan      MAC     ASA 3       Induction: intravenous. MIPS: Prophylactic antiemetics administered. Anesthetic plan and risks discussed with patient. Plan discussed with CRNA.                   Monty Plummer MD   2/2/2021

## 2021-02-02 NOTE — OP NOTE
ESOPHAGOGASTRODUODENOSCOPY   ( EGD )  DATE OF PROCEDURE: 2/2/2021     SURGEON: Johnathan Jung MD    ASSISTANT: None    PREOPERATIVE DIAGNOSIS: This 70-year-old gentleman has history of chronic liver disease secondary to alcoholism. Recently this patient had a EGD done and a biopsy taken from hernia sac. This biopsy revealed invasive adenocarcinoma. Unfortunately we did not have description of the lesion such as size of the lesion and other morphology Etc. Procedure performed to evaluate the lesion so that decision can be made regarding further management. POSTOPERATIVE DIAGNOSIS: Appears to have small esophageal varices and also small fundal varix. May have short segment Lezama's mucosa probably C2 M2. There is a small mucosal prominence, questionable lesion of about 3 to 5 mm in the upper margin of the Lezama's mucosa at about 34 cm from the incisor teeth. .    OPERATION: Upper GI endoscopy with Biopsy, spot marking. ANESTHESIA: MAC    ESTIMATED BLOOD LOSS: None    COMPLICATIONS: None. SPECIMENS:  Was Obtained: Lesion from possible Lezama's mucosa, to check for adenocarcinoma. Lesion is flat, 3 to 5 mm. HISTORY: The patient is a 63y.o. year old male with history of above preop diagnosis. I recommended esophagogastroduodenoscopy with possible biopsy and I explained the risk, benefits, expected outcome, and alternatives to the procedure. Risks included but are not limited to bleeding, infection, respiratory distress, hypotension, and perforation of the esophagus, stomach, or duodenum. Patient understands and is in agreement. PROCEDURE: The patient was given IV conscious sedation. The patient's SPO2 remained above 90% throughout the procedure. Cetacaine spray given. Patient placed in left lateral position.   Olympus  videogastroscope was inserted orally under vision into the esophagus without difficulty and advanced into the stomach then through the pylorus up to

## 2021-02-03 LAB — SURGICAL PATHOLOGY REPORT: NORMAL

## 2021-02-08 ENCOUNTER — OFFICE VISIT (OUTPATIENT)
Dept: GASTROENTEROLOGY | Age: 61
End: 2021-02-08
Payer: MEDICARE

## 2021-02-08 VITALS
SYSTOLIC BLOOD PRESSURE: 129 MMHG | HEART RATE: 68 BPM | DIASTOLIC BLOOD PRESSURE: 74 MMHG | BODY MASS INDEX: 31.34 KG/M2 | TEMPERATURE: 98.1 F | WEIGHT: 218.9 LBS | OXYGEN SATURATION: 98 % | HEIGHT: 70 IN

## 2021-02-08 DIAGNOSIS — C80.1 ADENOCARCINOMA IN A POLYP (HCC): Primary | ICD-10-CM

## 2021-02-08 DIAGNOSIS — K70.31 ALCOHOLIC CIRRHOSIS OF LIVER WITH ASCITES (HCC): ICD-10-CM

## 2021-02-08 PROCEDURE — 1036F TOBACCO NON-USER: CPT | Performed by: INTERNAL MEDICINE

## 2021-02-08 PROCEDURE — G8417 CALC BMI ABV UP PARAM F/U: HCPCS | Performed by: INTERNAL MEDICINE

## 2021-02-08 PROCEDURE — G8482 FLU IMMUNIZE ORDER/ADMIN: HCPCS | Performed by: INTERNAL MEDICINE

## 2021-02-08 PROCEDURE — 99214 OFFICE O/P EST MOD 30 MIN: CPT | Performed by: INTERNAL MEDICINE

## 2021-02-08 PROCEDURE — G8427 DOCREV CUR MEDS BY ELIG CLIN: HCPCS | Performed by: INTERNAL MEDICINE

## 2021-02-08 PROCEDURE — 3017F COLORECTAL CA SCREEN DOC REV: CPT | Performed by: INTERNAL MEDICINE

## 2021-02-08 ASSESSMENT — ENCOUNTER SYMPTOMS
SINUS PAIN: 0
TROUBLE SWALLOWING: 0
DIARRHEA: 0
BACK PAIN: 0
SORE THROAT: 0
NAUSEA: 0
SHORTNESS OF BREATH: 0
ALLERGIC/IMMUNOLOGIC NEGATIVE: 1
WHEEZING: 0
VOMITING: 0
ABDOMINAL PAIN: 0
CONSTIPATION: 0
FACIAL SWELLING: 0
ABDOMINAL DISTENTION: 0
VOICE CHANGE: 0
RESPIRATORY NEGATIVE: 1
SINUS PRESSURE: 0
COUGH: 0
GASTROINTESTINAL NEGATIVE: 1
RECTAL PAIN: 0
ANAL BLEEDING: 0
RHINORRHEA: 0
CHOKING: 0
BLOOD IN STOOL: 0

## 2021-02-08 NOTE — PROGRESS NOTES
GI OFFICE FOLLOW UP    INTERVAL HISTORY:   No referring provider defined for this encounter. Chief Complaint   Patient presents with   Charles Bias is here today to f/u on EGD       1. 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 ([facility]    2. Alcoholic cirrhosis of liver with ascites (HCC)              HISTORY OF PRESENT ILLNESS: Mr.Mark ARACELIS Waggoner is a 63 y.o. male with a past history remarkable for adenocarcinoma and Lezama's segment,   Patient has EGD done recently and was found to have 5 to 6 mm flat lesion in the background of Lezama's mucosa. Lezama's mucosa appears to be short segment. Biopsies from this area revealed invasive adenocarcinoma. This patient also known to have small esophageal varices and also small fundal varices. At present he is doing well. He does not have swelling of the lower extremities. No significant ascites. No signs of encephalopathy. His labs that were done 8 days ago reviewed including renal function and potassium which appears to be within normal limits. Patient has been on nonselective beta-blockers and PPI therapy as well. Past Medical,Family, and Social History reviewed and does contribute to the patient presenting condition. Patient's PMH/PSH,SH,PSYCH Hx, MEDs, ALLERGIES, and ROS were all reviewed and updated in the appropriate sections.  Yes      PAST MEDICAL HISTORY:  Past Medical History:   Diagnosis Date    Alcohol abuse     6-12 beers a day; quit drinking July 2016    Arthritis     Back pain, chronic     dr. Torrey Liao, orthopedic, every 3-4 months, gets steroid injection    BPH (benign prostatic hypertrophy)     Cholelithiasis     Cir[facility]osis ([facility]     DDD (degenerative disc disease), lumbar     Fatty liver     GERD (gastroesophageal reflux disease)     Hep C w/o coma, chronic ([facility]     History of colon polyps 2016    [place] (hard of hearing)  Hyperlipidemia     Hypertension     Stomach ulcer     hx of    Tobacco abuse     Tubular adenoma of colon 2016, 2018    Wears glasses        Past Surgical History:   Procedure Laterality Date    BUNIONECTOMY      twice on right side    BUNIONECTOMY Left     CARPAL TUNNEL RELEASE Right     COLONOSCOPY      at age 35    COLONOSCOPY  10/05/2016    polyps-pathology tubular adenoma, and abnormal looking mucosa right colon-pathology-tubular adenoma    COLONOSCOPY N/A 3/30/2018    COLONOSCOPY POLYPECTOMY COLD BIOPSY performed by Ev Perkins MD at [address] [place] [address]  03/30/2018    Small polyp in the sigmoid colon and excised with biopsy forceps--tubular adenoma    ENDOSCOPY, COLON, DIAGNOSTIC      EGD    KNEE SURGERY Left     cyst removed    NASAL SEPTUM SURGERY      NERVE BLOCK Right 11/23/2020    NERVE BLOCK RIGHT CERVICAL STEROID INJECTION  C3-C6 performed by Alexander Do MD at [address] [place] [address] Drive,[facility]  01/04/16    steroid injection C7 T1    OTHER SURGICAL HISTORY  11/21/2016    Bilateral Lumbar CACHORRO L4-L5 injections    OTHER SURGICAL HISTORY  12/19/2016    lumbar steroid injection    OTHER SURGICAL HISTORY  09/28/2018    BILATERAL L5 CACHORRO (N/A Back)    OTHER SURGICAL HISTORY Right 11/23/2020    cervical injection    PAIN MANAGEMENT PROCEDURE Left 7/9/2020    EPIDURAL STEROID INJECTION LEFT L4 L5 performed by Alexander Do MD at [address] [facility] Street Left 7/20/2020    LEFT L4 L5 EPIDURAL STEROID INJECTION performed by Alexander Do MD at [address] Bilateral 8/17/2020    LUMBAR FACET BILATERAL L2-L5 performed by Alexander Do MD at [address] Bilateral 12/7/2020    NERVE BLOCK BILATERAL LUMBAR MEDIAL BRANCH L2-L5 performed by Alexander Do MD at [address] AA&/STRD TFRML EPI LUMBAR/SACRAL 1 LEVEL Bilateral 9/6/2018    BILATERAL L5 CACHORRO performed by Alexander Do MD at [address] AA&/STRD TFRML EPI LUMBAR/SACRAL 1 LEVEL N/A 9/28/2018    BILATERAL L5 CACHORRO performed by Geovanna Macdonald MD at [address] [place] [address] N/CARPAL TUNNEL SURG Right 8/29/2017    CARPAL TUNNEL RELEASE RIGHT performed by Ky Austin MD at [address] [place] [address] N/CARPAL TUNNEL SURG Left 10/31/2017    CARPAL TUNNEL RELEASE performed by Ky Austin MD at [facility] N/A 12/29/2020    EGD BIOPSY performed by Adelaida Delatorre MD at [facility] N/A 2/2/2021    EGD BIOPSY and spot marking performed by Cherrie Gaucher, MD at [address] [place] [address] Street:    Current Outpatient Medications:     nadolol (CORGARD) 20 MG tablet, Take 1 tablet by mouth daily, Disp: 30 tablet, Rfl: 3    rifaximin (XIFAXAN) 550 MG tablet, Take 1 tablet by mouth 3 times daily, Disp: 42 tablet, Rfl: 0    pantoprazole (PROTONIX) 40 MG tablet, Take 1 tablet by mouth every morning (before breakfast), Disp: 30 tablet, Rfl: 3    FLUoxetine (PROZAC) 20 MG capsule, take 1 capsule by mouth once daily, Disp: 90 capsule, Rfl: 1    ANDRODERM 4 MG/24HR PT24, APPLY 1 PATCH TO SHOULDER EVERY DAY, Disp: , Rfl:     hydrOXYzine (VISTARIL) 25 MG capsule, take 1 capsule by mouth three times a day if needed for anxiety, Disp: 90 capsule, Rfl: 2    atorvastatin (LIPITOR) 20 MG tablet, take 1 tablet by mouth at bedtime, Disp: 90 tablet, Rfl: 1    spironolactone (ALDACTONE) 50 MG tablet, take 1 tablet by mouth once daily, Disp: 90 tablet, Rfl: 2    vitamin D (ERGOCALCIFEROL) 1.25 MG (23083 UT) CAPS capsule, take 1 capsule by mouth every week, Disp: 12 capsule, Rfl: 1    sildenafil (VIAGRA) 100 MG tablet, Take 1 tablet by mouth as needed for Erectile Dysfunction, Disp: 10 tablet, Rfl: 3    ALLERGIES:   No Known Allergies    FAMILY HISTORY:       Problem Relation Age of Onset    Cancer Mother         pancreatic    Cancer Father         bone    Diabetes Sister     Asthma Brother          SOCIAL HISTORY:   Social History     Socioeconomic History    Marital status: Single     Spouse name: Not on file    Number of children: Not on file    Years of education: Not on file    Highest education level: Not on file   Occupational History    Not on file   Social Needs    Financial resource strain: Not on file    Food insecurity     Worry: Not on file     Inability: Not on file    Transportation needs     Medical: Not on file     Non-medical: Not on file   Tobacco Use    Smoking status: Former Smoker     Packs/day: 1.00     Years: 45.00     Pack years: 45.00     Quit date: 2017     Years since quittin.0    Smokeless tobacco: Never Used   Substance and Sexual Activity    Alcohol use: No     Comment: 2016 he stopped drinking    Drug use: No     Frequency: 1.0 times per week     Comment: cocaine,  stopped spring 2016    Sexual activity: Yes     Partners: Female   Lifestyle    Physical activity     Days per week: Not on file     Minutes per session: Not on file    Stress: Not on file   Relationships    Social connections     Talks on phone: Not on file     Gets together: Not on file     Attends [employer] service: Not on file     Active member of club or organization: Not on file     Attends meetings of clubs or organizations: Not on file     Relationship status: Not on file    Intimate partner violence     Fear of current or ex partner: Not on file     Emotionally abused: Not on file     Physically abused: Not on file     Forced sexual activity: Not on file   Other Topics Concern    Not on file   Social History Narrative     in the past, retired         REVIEW OF SYSTEMS:         Review of Systems   Constitutional: Negative for activity change, appetite change, chills, diaphoresis, fatigue, fever and unexpected weight change. HENT: Positive for hearing loss (rt ear).  Negative for congestion, dental problem, drooling, ear discharge, ear pain, facial swelling, mouth sores, nosebleeds, postnasal drip, rhinorrhea, sinus pressure, sinus pain, sneezing, sore throat, tinnitus, trouble swallowing and voice change. Eyes: Positive for visual disturbance (wears glasses). Respiratory: Negative. Negative for cough, choking, shortness of breath and wheezing. Cardiovascular: Negative. Negative for chest pain, palpitations and leg swelling. Gastrointestinal: Negative. Negative for abdominal distention, abdominal pain, anal bleeding, blood in stool, constipation, diarrhea, nausea, rectal pain and vomiting. Denies   Endocrine: Negative. Negative for polydipsia, polyphagia and polyuria. Genitourinary: Negative for difficulty urinating, dysuria, frequency, hematuria and urgency. Musculoskeletal: Negative for arthralgias, back pain, gait problem, myalgias, neck pain and neck stiffness. Skin: Negative. Allergic/Immunologic: Negative. Negative for environmental allergies, food allergies and immunocompromised state. Neurological: Negative. Negative for dizziness, tremors, seizures, syncope, facial asymmetry, speech difficulty, weakness, light-headedness, numbness and headaches. Hematological: Negative. Does not bruise/bleed easily. Psychiatric/Behavioral: Negative for agitation, behavioral problems, confusion and sleep disturbance. The patient is not nervous/anxious. PHYSICAL EXAMINATION:     Vital signs reviewed per the nursing documentation. /74   Pulse 68   Temp 98.1 °F (36.7 °C)   Ht 5' 10\" (1.778 m)   Wt 218 lb 14.4 oz (99.3 kg)   SpO2 98%   BMI 31.41 kg/m²   Body mass index is 31.41 kg/m². Physical Exam  Vitals signs reviewed. Constitutional:       Appearance: Normal appearance. HENT:      Head: Normocephalic and atraumatic. Eyes:      General: Scleral icterus present. Extraocular Movements: Extraocular movements intact.       Conjunctiva/sclera: Conjunctivae normal.   Cardiovascular:      Rate and Rhythm: Normal rate and regular rhythm. Heart sounds: Normal heart sounds. Pulmonary:      Effort: Pulmonary effort is normal.      Breath sounds: Normal breath sounds. Abdominal:      General: Bowel sounds are normal. There is no distension. Palpations: Abdomen is soft. There is no mass. Tenderness: There is no abdominal tenderness. There is no guarding. Hernia: No hernia is present. Skin:     General: Skin is warm and dry. Neurological:      General: No focal deficit present. Mental Status: He is alert and oriented to person, place, and time. Psychiatric:         Mood and Affect: Mood normal.         Behavior: Behavior normal.           LABORATORY DATA: Reviewed  Lab Results   Component Value Date    WBC 4.3 12/30/2020    HGB 11.8 (L) 12/30/2020    HCT 36.2 (L) 12/30/2020    MCV 93.2 12/30/2020    PLT 97 (L) 12/30/2020     12/30/2020    K 3.6 (L) 12/30/2020     12/30/2020    CO2 26 12/30/2020    BUN 4 (L) 12/30/2020    CREATININE 0.40 (L) 12/30/2020    LABPROT 7.7 04/19/2012    LABALBU 2.3 (L) 12/30/2020    BILITOT 2.13 (H) 12/30/2020    ALKPHOS 110 12/30/2020    AST 74 (H) 12/30/2020    ALT 26 12/30/2020    INR 1.4 12/25/2020         Lab Results   Component Value Date    RBC 3.89 (L) 12/30/2020    HGB 11.8 (L) 12/30/2020    MCV 93.2 12/30/2020    MCH 30.4 12/30/2020    MCHC 32.6 12/30/2020    RDW 16.6 (H) 12/30/2020    MPV 8.6 12/30/2020    BASOPCT 0 12/30/2020    LYMPHSABS 1.16 12/30/2020    MONOSABS 0.30 12/30/2020    NEUTROABS 2.76 12/30/2020    EOSABS 0.04 12/30/2020    BASOSABS 0.00 12/30/2020         DIAGNOSTIC TESTING:     No results found. Assessment  1. 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 ([facility]    2. Alcoholic cirrhosis of liver with ascites ([facility]        Plan  This patient may need RFA of the Lezama's mucosa and SMR of the small lesion/adenocarcinoma in the Lezama's mucosa.   This needs to be arranged with a different physician and patient is advised to contact me in the next 2 to 3 days. Patient is explained regarding histology results/adenocarcinoma. Patient understood and agreed. Thank you for allowing me to participate in the care of Mr. Naz Cifuentes. For any further questions please do not hesitate to contact me. I have reviewed and agree with the ROS entered by the MA/LPN. Note is dictated utilizing voice recognition software. Unfortunately this leads to occasional typographical errors.  Please contact our office if you have any questions        Elijah Holland MD,FACP, [facility]  Board Certified in Gastroenterology and [address] [place] [address] Gastroenterology  Office #: [phone]

## 2021-02-10 NOTE — TELEPHONE ENCOUNTER
Per Dr. Rosalind Simon note    This patient may need RFA of the Lezama's mucosa and SMR of the small lesion/adenocarcinoma in the Lezama's mucosa. This needs to be arranged with a different physician and patient is advised to contact me in the next 2 to 3 days. Patient is explained regarding histology results/adenocarcinoma. Patient understood and agreed.         Per request of Dr. Sky Friedman at [facility], writer lvm for patient that Dr Sky Friedman would like him scheduled this Thur 2/11/21 at [facility] for an EUS/EGD for esophageal cancer/staging/resection. Writer has already spoken to [facility] surgery scheduler and they are able to do rapid covid if patient is agreeable to schedule.

## 2021-02-10 NOTE — TELEPHONE ENCOUNTER
Called [facility] back and informed that they will not be completing procedure tomorrow; procedure will be on Fri 2/12/21 at 830am with Dr Bladimir Zuniga in an isolation room. Pt needs to arrive at [facility] pt to inform him of the change in date of procedure. Pt voices understanding. Pt informed the EUS/EGD prep form will be sent to him via [employer].

## 2021-02-10 NOTE — TELEPHONE ENCOUNTER
Called pt informing him that Dr Bladimir Zuniga is able to do pt's EUS/EGD tomorrow at [facility]. Pt states he is able to do this. Pt advised to arrive at 8am for 10am procedure. Pt instructed to remain NPO after midnight for procedure tomorrow. Pt voices understanding. Writer called [facility] to book procedure; informed that pt tested positive for covid on 2/2/21 and they will need to contact Dr Bladimir Zuniga to approve the procedure tomorrow.

## 2021-02-12 ENCOUNTER — TELEPHONE (OUTPATIENT)
Dept: FAMILY MEDICINE CLINIC | Age: 61
End: 2021-02-12

## 2021-02-12 ENCOUNTER — TELEPHONE (OUTPATIENT)
Dept: GASTROENTEROLOGY | Age: 61
End: 2021-02-12

## 2021-02-12 ENCOUNTER — ANESTHESIA EVENT (OUTPATIENT)
Dept: ENDOSCOPY | Age: 61
End: 2021-02-12
Payer: MEDICARE

## 2021-02-12 ENCOUNTER — ANESTHESIA (OUTPATIENT)
Dept: ENDOSCOPY | Age: 61
End: 2021-02-12
Payer: MEDICARE

## 2021-02-12 ENCOUNTER — HOSPITAL ENCOUNTER (OUTPATIENT)
Age: 61
Setting detail: OUTPATIENT SURGERY
Discharge: HOME | End: 2021-02-12
Attending: INTERNAL MEDICINE | Admitting: INTERNAL MEDICINE
Payer: MEDICARE

## 2021-02-12 ENCOUNTER — HOSPITAL ENCOUNTER (OUTPATIENT)
Dept: ULTRASOUND IMAGING | Age: 61
Setting detail: OUTPATIENT SURGERY
Discharge: HOME | End: 2021-02-14
Attending: INTERNAL MEDICINE
Payer: MEDICARE

## 2021-02-12 VITALS
BODY MASS INDEX: 30.06 KG/M2 | HEIGHT: 70 IN | RESPIRATION RATE: 17 BRPM | WEIGHT: 210 LBS | SYSTOLIC BLOOD PRESSURE: 104 MMHG | DIASTOLIC BLOOD PRESSURE: 89 MMHG | HEART RATE: 63 BPM | TEMPERATURE: 97.6 F | OXYGEN SATURATION: 94 %

## 2021-02-12 VITALS
SYSTOLIC BLOOD PRESSURE: 86 MMHG | OXYGEN SATURATION: 97 % | DIASTOLIC BLOOD PRESSURE: 59 MMHG | RESPIRATION RATE: 12 BRPM

## 2021-02-12 DIAGNOSIS — R10.9 ABDOMINAL PAIN, UNSPECIFIED ABDOMINAL LOCATION: ICD-10-CM

## 2021-02-12 PROCEDURE — 2500000003 HC RX 250 WO HCPCS: Performed by: NURSE ANESTHETIST, CERTIFIED REGISTERED

## 2021-02-12 PROCEDURE — 2580000003 HC RX 258: Performed by: INTERNAL MEDICINE

## 2021-02-12 PROCEDURE — 3609017100 HC EGD: Performed by: INTERNAL MEDICINE

## 2021-02-12 PROCEDURE — 3700000000 HC ANESTHESIA ATTENDED CARE: Performed by: INTERNAL MEDICINE

## 2021-02-12 PROCEDURE — 3609018500 HC EGD US SCOPE W/ADJACENT STRUCTURES: Performed by: INTERNAL MEDICINE

## 2021-02-12 PROCEDURE — 43259 EGD US EXAM DUODENUM/JEJUNUM: CPT | Performed by: INTERNAL MEDICINE

## 2021-02-12 PROCEDURE — 7100000010 HC PHASE II RECOVERY - FIRST 15 MIN: Performed by: INTERNAL MEDICINE

## 2021-02-12 PROCEDURE — 2709999900 HC NON-CHARGEABLE SUPPLY: Performed by: INTERNAL MEDICINE

## 2021-02-12 PROCEDURE — 76975 GI ENDOSCOPIC ULTRASOUND: CPT

## 2021-02-12 PROCEDURE — 7100000011 HC PHASE II RECOVERY - ADDTL 15 MIN: Performed by: INTERNAL MEDICINE

## 2021-02-12 PROCEDURE — 3700000001 HC ADD 15 MINUTES (ANESTHESIA): Performed by: INTERNAL MEDICINE

## 2021-02-12 PROCEDURE — 6360000002 HC RX W HCPCS: Performed by: NURSE ANESTHETIST, CERTIFIED REGISTERED

## 2021-02-12 RX ORDER — SODIUM CHLORIDE 9 MG/ML
INJECTION, SOLUTION INTRAVENOUS CONTINUOUS
Status: DISCONTINUED | OUTPATIENT
Start: 2021-02-12 | End: 2021-02-12 | Stop reason: HOSPADM

## 2021-02-12 RX ORDER — PROPOFOL 10 MG/ML
INJECTION, EMULSION INTRAVENOUS PRN
Status: DISCONTINUED | OUTPATIENT
Start: 2021-02-12 | End: 2021-02-12 | Stop reason: SDUPTHER

## 2021-02-12 RX ORDER — PHENYLEPHRINE HCL IN 0.9% NACL 1 MG/10 ML
SYRINGE (ML) INTRAVENOUS PRN
Status: DISCONTINUED | OUTPATIENT
Start: 2021-02-12 | End: 2021-02-12 | Stop reason: SDUPTHER

## 2021-02-12 RX ORDER — LIDOCAINE HYDROCHLORIDE 10 MG/ML
INJECTION, SOLUTION EPIDURAL; INFILTRATION; INTRACAUDAL; PERINEURAL PRN
Status: DISCONTINUED | OUTPATIENT
Start: 2021-02-12 | End: 2021-02-12 | Stop reason: SDUPTHER

## 2021-02-12 RX ORDER — PROPOFOL 10 MG/ML
INJECTION, EMULSION INTRAVENOUS CONTINUOUS PRN
Status: DISCONTINUED | OUTPATIENT
Start: 2021-02-12 | End: 2021-02-12 | Stop reason: SDUPTHER

## 2021-02-12 RX ORDER — FENTANYL CITRATE 50 UG/ML
INJECTION, SOLUTION INTRAMUSCULAR; INTRAVENOUS PRN
Status: DISCONTINUED | OUTPATIENT
Start: 2021-02-12 | End: 2021-02-12 | Stop reason: SDUPTHER

## 2021-02-12 RX ADMIN — Medication 100 MCG: at 09:06

## 2021-02-12 RX ADMIN — PROPOFOL 20 MG: 10 INJECTION, EMULSION INTRAVENOUS at 09:13

## 2021-02-12 RX ADMIN — PROPOFOL 50 MG: 10 INJECTION, EMULSION INTRAVENOUS at 08:54

## 2021-02-12 RX ADMIN — Medication 100 MCG: at 09:28

## 2021-02-12 RX ADMIN — Medication 100 MCG: at 09:11

## 2021-02-12 RX ADMIN — PROPOFOL 100 MCG/KG/MIN: 10 INJECTION, EMULSION INTRAVENOUS at 08:54

## 2021-02-12 RX ADMIN — SODIUM CHLORIDE: 9 INJECTION, SOLUTION INTRAVENOUS at 08:16

## 2021-02-12 RX ADMIN — PROPOFOL 20 MG: 10 INJECTION, EMULSION INTRAVENOUS at 08:56

## 2021-02-12 RX ADMIN — FENTANYL CITRATE 50 MCG: 50 INJECTION, SOLUTION INTRAMUSCULAR; INTRAVENOUS at 08:58

## 2021-02-12 RX ADMIN — FENTANYL CITRATE 25 MCG: 50 INJECTION, SOLUTION INTRAMUSCULAR; INTRAVENOUS at 09:08

## 2021-02-12 RX ADMIN — LIDOCAINE HYDROCHLORIDE 50 MG: 10 INJECTION, SOLUTION EPIDURAL; INFILTRATION; INTRACAUDAL; PERINEURAL at 08:54

## 2021-02-12 RX ADMIN — PROPOFOL 20 MG: 10 INJECTION, EMULSION INTRAVENOUS at 09:01

## 2021-02-12 RX ADMIN — Medication 100 MCG: at 09:20

## 2021-02-12 RX ADMIN — PROPOFOL 10 MG: 10 INJECTION, EMULSION INTRAVENOUS at 09:08

## 2021-02-12 RX ADMIN — Medication 100 MCG: at 09:17

## 2021-02-12 ASSESSMENT — PAIN SCALES - GENERAL
PAINLEVEL_OUTOF10: 0
PAINLEVEL_OUTOF10: 0

## 2021-02-12 ASSESSMENT — LIFESTYLE VARIABLES: SMOKING_STATUS: 0

## 2021-02-12 ASSESSMENT — PAIN - FUNCTIONAL ASSESSMENT: PAIN_FUNCTIONAL_ASSESSMENT: 0-10

## 2021-02-12 NOTE — TELEPHONE ENCOUNTER
Patient left message to schedule a follow up appointment. Returned call and left a message that we scheduled him for 2/18/21 1:30 pm at the [place] office. If this does not work to call back.

## 2021-02-12 NOTE — OP NOTE
EGD/EUS      Patient:   Ta Juares    :    1960    Facility:   [facility]   Referring/PCP: MASSIMO Fitzgerald CNP    Procedure:   Esophagogastroduodenoscopy --diagnostic and Endoscopic ultrasound   Date:     2021   Endoscopist:  Pierre Romano MD, [facility]    Indication:   Esophageal cancer staging and possible EMR    Procedure diagnosis: Esophageal cancer T2N0Mx with underlying esophageal varices    Anesthesia:  MAC    Complications: None    Specimen: None    Description of Procedure:  Informed consent was obtained from the patient after explanation of the procedure including indications, description of the procedure,  benefits and possible risks and complications of the procedure, and alternatives. Questions were answered. The patient's history was reviewed and a directed physical examination was performed prior to the procedure. Patient was monitored throughout the procedure with pulse oximetry and periodic assessment of vital signs. Patient was sedated as noted above. With the patient in the left lateral decubitus position, the Olympus videoendoscope followed by endoechoendoscope was placed in the patient's mouth and under direct visualization passed into the esophagus. The scope was passed to the 2nd portion of the duodenum. The patient tolerated the procedure well and was taken to the recovery area in good condition. Estimated blood loss was none. EGD Findings[de-identified]   Esophagus: There is esophageal cancer at 38 cm from incisors with small ulcerated area with underlying Lezama's and underlying varices. The esophagus was otherwise normal.  Stomach: Stomach had small sliding hiatal hernia. Stomach also had mild to moderate portal hypertensive gastropathy in the fundus and body of the stomach. Stomach was otherwise normal.  Duodenum: normal    EUS findings:  Esophagus:  There was a hypoechoic lesion in the lower esophagus around 38 cm from incisors which appeared to be penetrating into the submucosa and into muscularis propria. There were esophageal varices around this lesion. There was no periesophageal lymphadenopathy. Pancreas: There was a diffuse lobularity and hyperechoic stranding in the entire pancreas. CBD: Normal, no stones, sludge. CBD diameter:  4 mm. Gallbladder: S gallbladder had multiple hyperechoic stones with shadowing. Recommendations: Refer to medical and radiation oncology.     Electronically signed by Sarah Celeste MD, FACG  on 2/12/2021 at 9:36 AM

## 2021-02-12 NOTE — ANESTHESIA PRE PROCEDURE
Department of Anesthesiology  Preprocedure Note       Name:  Serafin Smith   Age:  63 y.o.  :  1960                                          MRN:  [id number]         Date:  2021      Surgeon: Mohini Pride):  Baljinder Gonzalez MD    Procedure: Procedure(s):  ENDOSCOPIC ULTRASOUND, EGD    Medications prior to admission:   Prior to Admission medications    Medication Sig Start Date End Date Taking?  Authorizing Provider   nadolol (CORGARD) 20 MG tablet Take 1 tablet by mouth daily 20  Yes Vickey Brittle, MD   rifaximin Nydia Sermons) 550 MG tablet Take 1 tablet by mouth 3 times daily 20  Yes Vickey Brittle, MD   pantoprazole (PROTONIX) 40 MG tablet Take 1 tablet by mouth every morning (before breakfast) 20  Yes Vickey Brittle, MD   FLUoxetine (PROZAC) 20 MG capsule take 1 capsule by mouth once daily 20  Yes MASSIMO Coleman CNP   ANDRODERM 4 MG/24HR PT24 APPLY 1 PATCH TO SHOULDER EVERY DAY 10/28/20  Yes Historical Provider, MD   hydrOXYzine (VISTARIL) 25 MG capsule take 1 capsule by mouth three times a day if needed for anxiety 20  Yes MASSIMO Coleman CNP   atorvastatin (LIPITOR) 20 MG tablet take 1 tablet by mouth at bedtime 20  Yes MASSIMO Coleman CNP   spironolactone (ALDACTONE) 50 MG tablet take 1 tablet by mouth once daily 20  Yes MASSIMO Coleman CNP   vitamin D (ERGOCALCIFEROL) 1.25 MG (98035 UT) CAPS capsule take 1 capsule by mouth every week 20  Yes MASSIMO Coleman CNP   sildenafil (VIAGRA) 100 MG tablet Take 1 tablet by mouth as needed for Erectile Dysfunction 20   MASSIMO Villegas CNP       Current medications:    Current Facility-Administered Medications   Medication Dose Route Frequency Provider Last Rate Last Admin    0.9 % sodium chloride infusion   Intravenous Continuous Baljinder Gonzalez MD           Allergies:  No Known Allergies    Problem List:    Patient Active Problem List   Diagnosis Code    Back pain, chronic M54.9, G89.29    Hearing difficulty H91.90    GERD (gastroesophageal reflux disease) K21.9    Cervical radicular pain M54.12    DTs (delirium tremens) (HCC) F10.231    Hypomagnesemia E83.42    Chronic viral hepatitis B without delta agent and without coma (HCC) B18.1    Calculus of gallbladder without cholecystitis K80.20    Hep C w/o coma, chronic (HCC) B18.2    Fatty liver K76.0    Insomnia G47.00    Cirrhosis (HCC) K74.60    Hepatic cirrhosis (HCC) K74.60    Chronic bilateral low back pain with left-sided sciatica M54.42, G89.29    DDD (degenerative disc disease), lumbar M51.36    Depression F32.9    Grief F43.21    Tubular adenoma of colon D12.6    History of colon polyps Z86.010    Gynecomastia, male N58    Lumbar radiculitis M54.16    Lumbar disc herniation M51.26    Tinnitus H93.19    Eustachian tube dysfunction H69.80    Ganglion cyst M67.40    Carpal tunnel syndrome of right wrist G56.01    History of hepatitis C Z86.19    Vitamin D deficiency E55.9    Pure hypercholesterolemia E78.00    Hypokalemia E87.6    Essential hypertension I10    Recurrent major depressive disorder in partial remission (HCC) F33.41    S/P epidural steroid injection Z92.241    Elevated LFTs R79.89    Seasonal allergies J30.2    Lumbar facet joint syndrome M47.816    Cervical facet syndrome M47.812    Acute gastrointestinal bleeding K92.2    Thrombocytopenia (HCC) D69.6    Hepatitis C virus infection resolved after antiviral drug therapy Z86.19    Gastrointestinal hemorrhage with melena K92.1    Alcohol abuse F10.10    Altered mental status R41.82    Hypocalcemia E83.51    Hypophosphatemia E83.39    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 (HCC) C15.5, K22.70       Past Medical History:        Diagnosis Date    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 ([facility] [address]     Alcohol abuse     6-12 beers a day; quit drinking July 2016    Arthritis     Back pain, chronic      Sayda Reaves, orthopedic, every 3-4 months, gets steroid injection    Lezama esophagus     BPH (benign prostatic hypertrophy)     Cholelithiasis     Cirrhosis ([facility] [address]     COVID-19 12/2020    pt reports he tested + while at [facility] DDD (degenerative disc disease), lumbar     Fatty liver     GERD (gastroesophageal reflux disease)     Hep C w/o coma, chronic ([facility] [address]     History of colon polyps 2016    [place] (hard of hearing)     Hyperlipidemia     Hypertension     Stomach ulcer     hx of    Tobacco abuse     Tubular adenoma of colon 2016, 2018    Wears glasses        Past Surgical History:        Procedure Laterality Date    BUNIONECTOMY      twice on right side    BUNIONECTOMY Left     CARPAL TUNNEL RELEASE Right     COLONOSCOPY      at age 35    COLONOSCOPY  10/05/2016    polyps-pathology tubular adenoma, and abnormal looking mucosa right colon-pathology-tubular adenoma    COLONOSCOPY N/A 3/30/2018    COLONOSCOPY POLYPECTOMY COLD BIOPSY performed by Heri King MD at [address] [place] [address]  03/30/2018    Small polyp in the sigmoid colon and excised with biopsy forceps--tubular adenoma    ENDOSCOPY, COLON, DIAGNOSTIC      EGD    KNEE SURGERY Left     cyst removed    NASAL SEPTUM SURGERY      NERVE BLOCK Right 11/23/2020    NERVE BLOCK RIGHT CERVICAL STEROID INJECTION  C3-C6 performed by Shyla Mann MD at [address]  01/04/16    steroid injection C7 T1    OTHER SURGICAL HISTORY  11/21/2016    Bilateral Lumbar CACHORRO L4-L5 injections    OTHER SURGICAL HISTORY  12/19/2016    lumbar steroid injection    OTHER SURGICAL HISTORY  09/28/2018    BILATERAL L5 CACHORRO (N/A Back)    OTHER SURGICAL HISTORY Right 11/23/2020    cervical injection    PAIN MANAGEMENT PROCEDURE Left 7/9/2020    EPIDURAL STEROID INJECTION LEFT L4 L5 performed by Shyla Mann MD at [address] Left 7/20/2020    LEFT L4 L5 EPIDURAL STEROID INJECTION performed by Marilee Vasquez last liquid consumption: 02/12/21                        Date of last solid food consumption: 02/11/21    BMI:   Wt Readings from Last 3 Encounters:   02/12/21 210 lb (95.3 kg)   02/08/21 218 lb 14.4 oz (99.3 kg)   02/01/21 216 lb (98 kg)     Body mass index is 30.13 kg/m². CBC:   Lab Results   Component Value Date    WBC 4.3 12/30/2020    RBC 3.89 12/30/2020    RBC 4.75 04/19/2012    HGB 11.8 12/30/2020    HCT 36.2 12/30/2020    MCV 93.2 12/30/2020    RDW 16.6 12/30/2020    PLT 97 12/30/2020     04/19/2012       CMP:   Lab Results   Component Value Date     12/30/2020    K 3.6 12/30/2020     12/30/2020    CO2 26 12/30/2020    BUN 4 12/30/2020    CREATININE 0.40 12/30/2020    GFRAA >60 12/30/2020    LABGLOM >60 12/30/2020    GLUCOSE 88 12/30/2020    GLUCOSE 65 04/19/2012    PROT 5.5 12/30/2020    CALCIUM 7.8 12/30/2020    BILITOT 2.13 12/30/2020    ALKPHOS 110 12/30/2020    AST 74 12/30/2020    ALT 26 12/30/2020       POC Tests: No results for input(s): POCGLU, POCNA, POCK, POCCL, POCBUN, POCHEMO, POCHCT in the last 72 hours.     Coags:   Lab Results   Component Value Date    PROTIME 17.5 12/25/2020    INR 1.4 12/25/2020    APTT 37.9 12/23/2020       HCG (If Applicable): No results found for: PREGTESTUR, PREGSERUM, HCG, HCGQUANT     ABGs: No results found for: PHART, PO2ART, HBF2NFL, DUA8FUH, BEART, D4WAGBOY     Type & Screen (If Applicable):  No results found for: LABABO, LABRH    Drug/Infectious Status (If Applicable):  Lab Results   Component Value Date    HEPCAB REACTIVE 12/23/2020       COVID-19 Screening (If Applicable):   Lab Results   Component Value Date    COVID19 DETECTED 02/02/2021    COVID19 Not Detected 07/17/2020         Anesthesia Evaluation  Patient summary reviewed no history of anesthetic complications:   Airway: Mallampati: II  TM distance: >3 FB   Neck ROM: full  Mouth opening: > = 3 FB Dental:          Pulmonary:       (-) not a current smoker Cardiovascular:  Exercise tolerance: good (>4 METS),   (+) hypertension: mild, hyperlipidemia      ECG reviewed  Rhythm: regular  Rate: normal           Beta Blocker:  Dose within 24 Hrs         Neuro/Psych:   (+) neuromuscular disease:, psychiatric history:depression/anxiety             GI/Hepatic/Renal:   (+) GERD: well controlled, PUD, hepatitis: C, liver disease:,           Endo/Other:    (+) blood dyscrasia: anemia and thrombocytopenia, arthritis:., malignancy/cancer. Abdominal:           Vascular: negative vascular ROS. Narrative & Impression    Normal sinus rhythm  Nonspecific ST abnormality  Prolonged QT  Abnormal ECG  When compared with ECG of 17-AUG-2017 14:49,  QT has lengthened          Anesthesia Plan      MAC     ASA 3       Induction: intravenous. Anesthetic plan and risks discussed with patient. Use of blood products discussed with patient whom. Blood Products Consent Comment: Pt consents verbally to receiving blood products if needed  Plan discussed with attending.     Attending anesthesiologist reviewed and agrees with Pre Eval content              MASSIMO Cruz - CRNA   2/12/2021

## 2021-02-12 NOTE — ANESTHESIA POSTPROCEDURE EVALUATION
Department of Anesthesiology  Postprocedure Note    Patient: Syed Villanueva  MRN: [id number]  YOB: 1960  Date of evaluation: 2/12/2021  Time:  11:29 AM     Procedure Summary     Date: 02/12/21 Room / Location: [address] [place] [address]    Anesthesia Start: 6495 Anesthesia Stop: 0933    Procedures:       ENDOSCOPIC ULTRASOUND, EGD (N/A )      EGD DIAGNOSTIC ONLY Diagnosis: (ADENOMA CARCINOMA, POLYP)    Surgeons: Cassie Betancourt MD Responsible Provider: Ana Cristina Murry MD    Anesthesia Type: MAC ASA Status: 3          Anesthesia Type: MAC    Yen Phase I:      Yen Phase II: Yen Score: 10    Last vitals: Reviewed and per EMR flowsheets.        Anesthesia Post Evaluation    Patient location during evaluation: PACU  Patient participation: complete - patient participated  Level of consciousness: awake and alert  Pain score: 0  Airway patency: patent  Nausea & Vomiting: no nausea and no vomiting  Complications: no  Cardiovascular status: hemodynamically stable  Respiratory status: room air  Hydration status: euvolemic

## 2021-02-12 NOTE — H&P
History and Physical    Pt Name: William Magana  MRN: [id number]  YOB: 1960  Date of evaluation: 2/12/2021  Primary Care Physician: MASSIMO Mistry CNP    SUBJECTIVE:   History of Chief Complaint:    William Magana is a 63 y.o. male who is scheduled today for EUS, EGD. He reports \"I have a cancerous lesion in my lower esophagus\" and is here for further evaluation/treatment. Had EGD 2/2/21 with Dr. Sarah Rodriguez per his notes was found to have 5 to 6 mm flat lesion in the background of Lezama's mucosa. Lezama's mucosa appears to be short segment. Biopsies from this area revealed invasive adenocarcinoma, also small esophageal varices and also small fundal varices. Patient reports being sober from alcohol and drug use for several years. Allergies  has No Known Allergies. Medications  Prior to Admission medications    Medication Sig Start Date End Date Taking?  Authorizing Provider   nadolol (CORGARD) 20 MG tablet Take 1 tablet by mouth daily 12/31/20  Yes Thaddeus Payan MD   rifaximin Darin Rickers) 550 MG tablet Take 1 tablet by mouth 3 times daily 12/30/20  Yes Thaddeus Payan MD   pantoprazole (PROTONIX) 40 MG tablet Take 1 tablet by mouth every morning (before breakfast) 12/31/20  Yes Thaddeus Payan MD   FLUoxetine (PROZAC) 20 MG capsule take 1 capsule by mouth once daily 11/6/20  Yes MASSIMO Coleman CNP   ANDRODERM 4 MG/24HR PT24 APPLY 1 PATCH TO SHOULDER EVERY DAY 10/28/20  Yes Historical Provider, MD   hydrOXYzine (VISTARIL) 25 MG capsule take 1 capsule by mouth three times a day if needed for anxiety 7/28/20  Yes MASSIMO Coleman CNP   atorvastatin (LIPITOR) 20 MG tablet take 1 tablet by mouth at bedtime 7/1/20  Yes MASSIMO Coleman CNP   spironolactone (ALDACTONE) 50 MG tablet take 1 tablet by mouth once daily 6/30/20  Yes MASSIMO Coleman CNP   vitamin D (ERGOCALCIFEROL) 1.25 MG (30262 UT) CAPS capsule take 1 capsule by mouth every week 5/21/20  Yes Rachel Phan APRN - CNP   sildenafil (VIAGRA) 100 MG tablet Take 1 tablet by mouth as needed for Erectile Dysfunction 5/21/20   MASSIMO Lira CNP     Past Medical History    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 ([facility] [address] Alcohol abuse, Arthritis, Back pain, chronic, Lezama esophagus, BPH (benign prostatic hypertrophy), Cholelithiasis, Cirrhosis ([facility] [address] COVID-19, DDD (degenerative disc disease), lumbar, Fatty liver, GERD (gastroesophageal reflux disease), Hep C w/o coma, chronic ([facility] [address] History of colon polyps, [place] (hard of hearing), Hyperlipidemia, Hypertension, Stomach ulcer, Tobacco abuse, Tubular adenoma of colon, and Wears glasses. Past Surgical History   has a past surgical history that includes Bunionectomy; Nasal septum surgery; other surgical history (01/04/16); Colonoscopy; Colonoscopy (10/05/2016); other surgical history (11/21/2016); other surgical history (12/19/2016); knee surgery (Left); Bunionectomy (Left); Endoscopy, colon, diagnostic; pr revise median n/carpal tunnel surg (Right, 8/29/2017); Carpal tunnel release (Right); pr revise median n/carpal tunnel surg (Left, 10/31/2017); Colonoscopy (N/A, 3/30/2018); Colonoscopy (03/30/2018); pr njx aa&/strd tfrml epi lumbar/sacral 1 level (Bilateral, 9/6/2018); other surgical history (09/28/2018); pr njx aa&/strd tfrml epi lumbar/sacral 1 level (N/A, 9/28/2018); Pain management procedure (Left, 7/9/2020); Pain management procedure (Left, 7/20/2020); Pain management procedure (Bilateral, 8/17/2020); other surgical history (Right, 11/23/2020); Nerve Block (Right, 11/23/2020); Pain management procedure (Bilateral, 12/7/2020); Upper gastrointestinal endoscopy (N/A, 12/29/2020); and Upper gastrointestinal endoscopy (N/A, 2/2/2021). Social History   reports that he quit smoking about 4 years ago. He has a 45.00 pack-year smoking history. He has never used smokeless tobacco.   reports no history of alcohol use.    reports no history of drug use.  Marital Status single  Children   Occupation retired local iron[employer]  Family History  Family Status   Relation Name Status    Mother     Mary Kay Ramos Father     [facility] Sister basia Alive    Brother saeed Alive   Louisa Stella     family history includes Asthma in his brother; Cancer in his father and mother; Diabetes in his sister. OBJECTIVE:   VITALS:  height is 5' 10\" (1.778 m) and weight is 210 lb (95.3 kg). His infrared temperature is 97 °F (36.1 °C). His blood pressure is 120/74 and his pulse is 62. His respiration is 16 and oxygen saturation is 96%. CONSTITUTIONAL:Alert and orientated to person, place and time. No acute distress. Friendly. Pleasant, joking around. SKIN:  Warm & dry, no rashes on exposed skin  HEENT: HEAD: Normocephalic, atraumatic        EYES:  PERRL, EOMs intact, conjunctiva clear, wearing glasses       EARS:  Equal bilaterally, no edema or thickening, skin is intact without lumps or lesions. No discharge. HEARING LOSS. NOSE:  Nares patent, septum midline, no rhinorrhea      MOUTH/THROAT:  Mucous membranes moist, tongue is pink,   NECK:  Supple, full ROM  LUNGS: Respirations even and non-labored. Clear to auscultation bilaterally, no wheezes/rales/rhonchi   CARDIOVASCULAR: regular rate and rhythm, no murmurs/rubs/gallops   ABDOMEN: soft, non-tender, non-distended, bowel sounds active x 4, rotund   MUSCULOSKELETAL: Full ROM bilateral upper extremities, Full ROM bilateral lower extremities. Strength of 5/5 bilateral upper extremities. Strength 5/5 bilateral lower extremities. VASCULAR:  Brisk cap refill bilateral fingers. Radial pulses are intact, 2+ bilaterally. Dorsalis pedis pulse 2+ bilaterally. No edema or varicosities bilateral lower extremities  NEUROLOGIC: CN II-XII are grossly intact. Gait not assessed.      IMPRESSIONS:   Adenoma carcinoma, polyp       Diagnosis Date    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 ([facility] [address]     Alcohol abuse     6-12 beers a day; quit drinking July 2016    Arthritis     Back pain, chronic     dr. Kate Morales, orthopedic, every 3-4 months, gets steroid injection    Lezama esophagus     BPH (benign prostatic hypertrophy)     Cholelithiasis     Cirrhosis ([facility] [address]     COVID-19 12/2020    pt reports he tested + while at [facility] DDD (degenerative disc disease), lumbar     Fatty liver     GERD (gastroesophageal reflux disease)     Hep C w/o coma, chronic ([facility] [address]     History of colon polyps 2016    [place]     Hyperlipidemia     Hypertension     Stomach ulcer     hx of    Tobacco abuse     Tubular adenoma of colon 2016, 2018    Wears glasses    1.    PLANS:   EUS, EGD    ARMOND KEYS-CNP  Electronically signed 2/12/2021 at 7:57 AM

## 2021-02-15 ENCOUNTER — TELEPHONE (OUTPATIENT)
Dept: ONCOLOGY | Age: 61
End: 2021-02-15

## 2021-02-15 NOTE — TELEPHONE ENCOUNTER
Dr. Brennon Nye called stated he wanted patient added on to his schedule on 2/23. Did tried calling patient no answer. Did leave a message asking patient to call to schedule his appointment. Will try calling patient again to schedule his appointment.

## 2021-02-17 ENCOUNTER — TELEPHONE (OUTPATIENT)
Dept: ONCOLOGY | Age: 61
End: 2021-02-17

## 2021-02-17 ENCOUNTER — TELEPHONE (OUTPATIENT)
Dept: GASTROENTEROLOGY | Age: 61
End: 2021-02-17

## 2021-02-17 ENCOUNTER — OFFICE VISIT (OUTPATIENT)
Dept: GASTROENTEROLOGY | Age: 61
End: 2021-02-17
Payer: MEDICARE

## 2021-02-17 VITALS
OXYGEN SATURATION: 98 % | SYSTOLIC BLOOD PRESSURE: 105 MMHG | TEMPERATURE: 98.1 F | HEIGHT: 70 IN | WEIGHT: 221.4 LBS | BODY MASS INDEX: 31.7 KG/M2 | HEART RATE: 63 BPM | DIASTOLIC BLOOD PRESSURE: 66 MMHG

## 2021-02-17 DIAGNOSIS — C80.1 ADENOCARCINOMA IN A POLYP (HCC): Primary | ICD-10-CM

## 2021-02-17 DIAGNOSIS — Z86.19 HISTORY OF HEPATITIS C: ICD-10-CM

## 2021-02-17 DIAGNOSIS — K70.31 ALCOHOLIC CIRRHOSIS OF LIVER WITH ASCITES (HCC): ICD-10-CM

## 2021-02-17 PROCEDURE — 1036F TOBACCO NON-USER: CPT | Performed by: INTERNAL MEDICINE

## 2021-02-17 PROCEDURE — 3017F COLORECTAL CA SCREEN DOC REV: CPT | Performed by: INTERNAL MEDICINE

## 2021-02-17 PROCEDURE — G8482 FLU IMMUNIZE ORDER/ADMIN: HCPCS | Performed by: INTERNAL MEDICINE

## 2021-02-17 PROCEDURE — G8427 DOCREV CUR MEDS BY ELIG CLIN: HCPCS | Performed by: INTERNAL MEDICINE

## 2021-02-17 PROCEDURE — APPSS45 APP SPLIT SHARED TIME 31-45 MINUTES: Performed by: NURSE PRACTITIONER

## 2021-02-17 PROCEDURE — 99214 OFFICE O/P EST MOD 30 MIN: CPT | Performed by: INTERNAL MEDICINE

## 2021-02-17 PROCEDURE — G8417 CALC BMI ABV UP PARAM F/U: HCPCS | Performed by: INTERNAL MEDICINE

## 2021-02-17 ASSESSMENT — ENCOUNTER SYMPTOMS
GASTROINTESTINAL NEGATIVE: 1
VOMITING: 0
SORE THROAT: 0
CONSTIPATION: 0
SINUS PRESSURE: 0
WHEEZING: 0
ANAL BLEEDING: 0
BLOOD IN STOOL: 0
TROUBLE SWALLOWING: 0
RESPIRATORY NEGATIVE: 1
VOICE CHANGE: 0
NAUSEA: 0
CHOKING: 0
ABDOMINAL PAIN: 0
ALLERGIC/IMMUNOLOGIC NEGATIVE: 1
SINUS PAIN: 0
BACK PAIN: 0
RECTAL PAIN: 0
FACIAL SWELLING: 0
RHINORRHEA: 0
SHORTNESS OF BREATH: 0
ABDOMINAL DISTENTION: 0
COUGH: 0
DIARRHEA: 0

## 2021-02-17 NOTE — TELEPHONE ENCOUNTER
Patient called and left a message that the number give for Dr Osman Kyle is not correct. Writer has the same number and must no longer have an office in [place] but he has a number of [phone] that is the Cancer oncology center in [facility]. Writer left message on patients voicemail with the correct number.

## 2021-02-17 NOTE — PROGRESS NOTES
Lezama esophagus     BPH (benign prostatic hypertrophy)     Cholelithiasis     Cirrhosis ([facility]     COVID-19 12/2020    pt reports he tested + while at [facility] DDD (degenerative disc disease), lumbar     Esophageal varices ([facility]     Fatty liver     GERD (gastroesophageal reflux disease)     Hep C w/o coma, chronic ([facility]     History of colon polyps 2016    [place] (hard of hearing)     Hyperlipidemia     Hypertension     Stomach ulcer     hx of    Tobacco abuse     Tubular adenoma of colon 2016, 2018    Wears glasses        Past Surgical History:   Procedure Laterality Date    BUNIONECTOMY      twice on right side    BUNIONECTOMY Left     CARPAL TUNNEL RELEASE Right     COLONOSCOPY      at age 35    COLONOSCOPY  10/05/2016    polyps-pathology tubular adenoma, and abnormal looking mucosa right colon-pathology-tubular adenoma    COLONOSCOPY N/A 3/30/2018    COLONOSCOPY POLYPECTOMY COLD BIOPSY performed by Vincent Rodriguez MD at [address] [place] [address]  03/30/2018    Small polyp in the sigmoid colon and excised with biopsy forceps--tubular adenoma    ENDOSCOPY, COLON, DIAGNOSTIC      EGD    KNEE SURGERY Left     cyst removed    NASAL SEPTUM SURGERY      NERVE BLOCK Right 11/23/2020    NERVE BLOCK RIGHT CERVICAL STEROID INJECTION  C3-C6 performed by Kathryn Giron MD at [address] [place] [address]  01/04/16    steroid injection C7 T1    OTHER SURGICAL HISTORY  11/21/2016    Bilateral Lumbar CACHORRO L4-L5 injections    OTHER SURGICAL HISTORY  12/19/2016    lumbar steroid injection    OTHER SURGICAL HISTORY  09/28/2018    BILATERAL L5 CACHORRO (N/A Back)    OTHER SURGICAL HISTORY Right 11/23/2020    cervical injection    PAIN MANAGEMENT PROCEDURE Left 7/9/2020    EPIDURAL STEROID INJECTION LEFT L4 L5 performed by Kathryn Giron MD at [address] [place] [address] 7/20/2020    LEFT L4 L5 EPIDURAL STEROID INJECTION performed by Kathryn Giron MD at [place] [address] MANAGEMENT PROCEDURE Bilateral 8/17/2020    LUMBAR FACET BILATERAL L2-L5 performed by Dc Panda MD at [address] Bilateral 12/7/2020    NERVE BLOCK BILATERAL LUMBAR MEDIAL BRANCH L2-L5 performed by Dc Panda MD at [address] AA&/STRD TFRML EPI LUMBAR/SACRAL 1 LEVEL Bilateral 9/6/2018    BILATERAL L5 CACHORRO performed by Dc Panda MD at [address] AA&/STRD TFRML EPI LUMBAR/SACRAL 1 LEVEL N/A 9/28/2018    BILATERAL L5 CACHORRO performed by Dc Panda MD at [address] N/CARPAL TUNNEL SURG Right 8/29/2017    CARPAL TUNNEL RELEASE RIGHT performed by Lee Allen MD at [address] [facility] N/CARPAL TUNNEL SURG Left 10/31/2017    CARPAL TUNNEL RELEASE performed by Lee Allen MD at [address] N/A 12/29/2020    EGD BIOPSY performed by Smita Campbell MD at [address] N/A 2/2/2021    EGD BIOPSY and spot marking performed by Chadd Bauer MD at [address] N/A 2/12/2021    ENDOSCOPIC ULTRASOUND, EGD performed by Daya Rosa MD at [facility]  2/12/2021    EGD DIAGNOSTIC ONLY performed by Daya Rosa MD at [address]:    Current Outpatient Medications:     nadolol (CORGARD) 20 MG tablet, Take 1 tablet by mouth daily, Disp: 30 tablet, Rfl: 3    rifaximin (XIFAXAN) 550 MG tablet, Take 1 tablet by mouth 3 times daily, Disp: 42 tablet, Rfl: 0    pantoprazole (PROTONIX) 40 MG tablet, Take 1 tablet by mouth every morning (before breakfast), Disp: 30 tablet, Rfl: 3    FLUoxetine (PROZAC) 20 MG capsule, take 1 capsule by mouth once daily, Disp: 90 capsule, Rfl: 1    ANDRODERM 4 MG/24HR PT24, APPLY 1 PATCH TO SHOULDER EVERY DAY, Disp: , Rfl:     hydrOXYzine (VISTARIL) 25 MG capsule, take 1 capsule by mouth three times a day if needed for anxiety, Disp: 90 capsule, Rfl: 2    atorvastatin (LIPITOR) 20 MG tablet, take 1 tablet by mouth at bedtime, Disp: 90 tablet, Rfl: 1    spironolactone (ALDACTONE) 50 MG tablet, take 1 tablet by mouth once daily, Disp: 90 tablet, Rfl: 2    vitamin D (ERGOCALCIFEROL) 1.25 MG (09642 UT) CAPS capsule, take 1 capsule by mouth every week, Disp: 12 capsule, Rfl: 1    sildenafil (VIAGRA) 100 MG tablet, Take 1 tablet by mouth as needed for Erectile Dysfunction, Disp: 10 tablet, Rfl: 3    ALLERGIES:   No Known Allergies    FAMILY HISTORY:       Problem Relation Age of Onset    Cancer Mother         pancreatic    Cancer Father         bone    Diabetes Sister     Asthma Brother          SOCIAL HISTORY:   Social History     Socioeconomic History    Marital status: Single     Spouse name: Not on file    Number of children: Not on file    Years of education: Not on file    Highest education level: Not on file   Occupational History    Not on file   Social Needs    Financial resource strain: Not on file    Food insecurity     Worry: Not on file     Inability: Not on file    Transportation needs     Medical: Not on file     Non-medical: Not on file   Tobacco Use    Smoking status: Former Smoker     Packs/day: 1.00     Years: 45.00     Pack years: 45.00     Quit date: 2017     Years since quittin.0    Smokeless tobacco: Never Used   Substance and Sexual Activity    Alcohol use: No     Comment: 2016 he stopped drinking    Drug use: No     Frequency: 1.0 times per week     Comment: cocaine,  stopped spring 2016    Sexual activity: Yes     Partners: Female   Lifestyle    Physical activity     Days per week: Not on file     Minutes per session: Not on file    Stress: Not on file   Relationships    Social connections     Talks on phone: Not on file     Gets together: Not on file     Attends Latter day service: Not on file     Active member of club or organization: Not on file     Attends meetings of clubs or organizations: Not on file     Relationship status: Not on file    Intimate partner violence     Fear of current or ex partner: Not on file     Emotionally abused: Not on file     Physically abused: Not on file     Forced sexual activity: Not on file   Other Topics Concern    Not on file   Social History Narrative     in the past, retired         REVIEW OF SYSTEMS:         Review of Systems   Constitutional: Negative for activity change, appetite change, chills, diaphoresis, fatigue, fever and unexpected weight change. HENT: Positive for hearing loss (rt ear). Negative for congestion, dental problem, drooling, ear discharge, ear pain, facial swelling, mouth sores, nosebleeds, postnasal drip, rhinorrhea, sinus pressure, sinus pain, sneezing, sore throat, tinnitus, trouble swallowing and voice change. Eyes: Positive for visual disturbance (wears glasses). Respiratory: Negative. Negative for cough, choking, shortness of breath and wheezing. Cardiovascular: Negative. Negative for chest pain, palpitations and leg swelling. Gastrointestinal: Negative. Negative for abdominal distention, abdominal pain, anal bleeding, blood in stool, constipation, diarrhea, nausea, rectal pain and vomiting. Denies   Endocrine: Negative. Negative for polydipsia, polyphagia and polyuria. Genitourinary: Negative for difficulty urinating, dysuria, frequency, hematuria and urgency. Musculoskeletal: Negative for arthralgias, back pain, gait problem, myalgias, neck pain and neck stiffness. Skin: Negative. Allergic/Immunologic: Negative. Negative for environmental allergies, food allergies and immunocompromised state. Neurological: Negative. Negative for dizziness, tremors, seizures, syncope, facial asymmetry, speech difficulty, weakness, light-headedness, numbness and headaches. Hematological: Negative. Does not bruise/bleed easily.    Psychiatric/Behavioral: Negative for agitation, behavioral problems, confusion and sleep disturbance. The patient is not nervous/anxious. PHYSICAL EXAMINATION:     Vital signs reviewed per the nursing documentation. /66   Pulse 63   Temp 98.1 °F (36.7 °C)   Ht 5' 10\" (1.778 m)   Wt 221 lb 6.4 oz (100.4 kg)   SpO2 98%   BMI 31.77 kg/m²   Body mass index is 31.77 kg/m². Physical Exam  Vitals signs and nursing note reviewed. Constitutional:       General: He is not in acute distress. Appearance: He is well-developed. HENT:      Head: Normocephalic and atraumatic. Mouth/Throat:      Pharynx: No oropharyngeal exudate. Eyes:      General: No scleral icterus. Conjunctiva/sclera: Conjunctivae normal.      Pupils: Pupils are equal, round, and reactive to light. Neck:      Musculoskeletal: Normal range of motion and neck supple. Thyroid: No thyromegaly. Trachea: No tracheal deviation. Cardiovascular:      Rate and Rhythm: Normal rate and regular rhythm. Heart sounds: Normal heart sounds. No murmur. Pulmonary:      Effort: Pulmonary effort is normal. No respiratory distress. Breath sounds: Normal breath sounds. No wheezing or rales. Abdominal:      General: Bowel sounds are normal. There is no distension. Palpations: Abdomen is soft. There is no hepatomegaly or mass. Tenderness: There is no abdominal tenderness. There is no guarding or rebound. Hernia: No hernia is present. Genitourinary:     Rectum: Normal.   Lymphadenopathy:      Cervical: No cervical adenopathy. Skin:     General: Skin is warm and dry. Findings: No erythema or rash. Neurological:      Mental Status: He is alert and oriented to person, place, and time. Cranial Nerves: No cranial nerve deficit. Psychiatric:         Thought Content:  Thought content normal.           LABORATORY DATA: Reviewed  Lab Results   Component Value Date    WBC 4.3 12/30/2020    HGB 11.8 (L) 12/30/2020    HCT 36.2 (L) 12/30/2020    MCV 93.2 12/30/2020    PLT 97 (L) 12/30/2020     12/30/2020    K 3.6 (L) 12/30/2020     12/30/2020    CO2 26 12/30/2020    BUN 4 (L) 12/30/2020    CREATININE 0.40 (L) 12/30/2020    LABPROT 7.7 04/19/2012    LABALBU 2.3 (L) 12/30/2020    BILITOT 2.13 (H) 12/30/2020    ALKPHOS 110 12/30/2020    AST 74 (H) 12/30/2020    ALT 26 12/30/2020    INR 1.4 12/25/2020         Lab Results   Component Value Date    RBC 3.89 (L) 12/30/2020    HGB 11.8 (L) 12/30/2020    MCV 93.2 12/30/2020    MCH 30.4 12/30/2020    MCHC 32.6 12/30/2020    RDW 16.6 (H) 12/30/2020    MPV 8.6 12/30/2020    BASOPCT 0 12/30/2020    LYMPHSABS 1.16 12/30/2020    MONOSABS 0.30 12/30/2020    NEUTROABS 2.76 12/30/2020    EOSABS 0.04 12/30/2020    BASOSABS 0.00 12/30/2020         DIAGNOSTIC TESTING:     Us Gi Endoscopic S&i    Result Date: 2/12/2021  Radiology exam is complete. No Radiologist dictation. Please follow up with ordering provider. Assessment  1. 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 Alcoholic cirrhosis of liver with ascites ([facility] [address] [facility]    3. History of hepatitis C        Plan  EGD/EUS reviewed with patient in detail. Unable to have SMR. May not be surgical candidate with his hx of alcoholic cirrhosis of the liver. Case discussed between Dr. Rich Thakur and Dr. Alix Castleman. Will see Dr. Alix Castleman 02/23/21. Await recommendations. Continue PPI  Continue nonselective beta-blocker  Alcohol abstinence. Thank you for allowing me to participate in the care of Mr. Kenny Ortiz. For any further questions please do not hesitate to contact me. I independently performed an evaluation on [employer]. I have reviewed the above documentation completed by the Nurse Practitioner and agree with the documented findings and plan of care. I have personally evaluated this patient with the APRN and have completed at least one if not all key elements of the E/M (history, physical exam, and MDM).  Please see my additional contributions to the HPI, physical exam, assessment, and medical decision making. Discussed with the patient in detail regarding EGD, US findings. Explained that the lesion is extending to deep layers of esophageal wall and could not be removed. For this reason he is advised to see oncology under radiation therapy. Personally I did talk to oncologist regarding this patient and appointment made. After discussion with the oncologist the consensus is that he may be referred to [address] [place] [address],[address] for surgical opinion as well. After above discussion, patient understood and verbalized agreement. I have reviewed and agree with the ROS entered by the MA/LPN. Note is dictated utilizing voice recognition software. Unfortunately this leads to occasional typographical errors.  Please contact our office if you have any questions        Patricia Schaefer MD,FACP, [facility]  Board Certified in Gastroenterology and [address] [place] [address] Gastroenterology  Office #: [phone]

## 2021-02-17 NOTE — TELEPHONE ENCOUNTER
Received internal referral from Sadie Valenzuela MD for pt to see Dr. 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 LM for pt to call back and schedule consult. Per Buffy Eddy, he wants to see pt on 2/23/2021. Referral is in deferred work-q.           Electronically signed by Melani Yu on 2/17/2021 at 3:05 PM

## 2021-02-23 ENCOUNTER — OFFICE VISIT (OUTPATIENT)
Dept: FAMILY MEDICINE CLINIC | Age: 61
End: 2021-02-23
Payer: MEDICARE

## 2021-02-23 VITALS
HEART RATE: 72 BPM | DIASTOLIC BLOOD PRESSURE: 82 MMHG | OXYGEN SATURATION: 95 % | TEMPERATURE: 96.9 F | HEIGHT: 70 IN | WEIGHT: 217.4 LBS | SYSTOLIC BLOOD PRESSURE: 126 MMHG | BODY MASS INDEX: 31.12 KG/M2

## 2021-02-23 DIAGNOSIS — E55.9 VITAMIN D DEFICIENCY: ICD-10-CM

## 2021-02-23 DIAGNOSIS — C15.5 PRIMARY ADENOCARCINOMA OF LOWER THIRD OF ESOPHAGUS DUE TO BARRETT ESOPHAGUS (HCC): ICD-10-CM

## 2021-02-23 DIAGNOSIS — E78.00 PURE HYPERCHOLESTEROLEMIA: ICD-10-CM

## 2021-02-23 DIAGNOSIS — U07.1 COVID-19: ICD-10-CM

## 2021-02-23 DIAGNOSIS — K22.70 PRIMARY ADENOCARCINOMA OF LOWER THIRD OF ESOPHAGUS DUE TO BARRETT ESOPHAGUS (HCC): ICD-10-CM

## 2021-02-23 DIAGNOSIS — N63.0 BREAST MASS IN MALE: ICD-10-CM

## 2021-02-23 DIAGNOSIS — F41.9 ANXIETY: ICD-10-CM

## 2021-02-23 DIAGNOSIS — N62 GYNECOMASTIA: Primary | ICD-10-CM

## 2021-02-23 DIAGNOSIS — Z23 NEED FOR PROPHYLACTIC VACCINATION AND INOCULATION AGAINST VARICELLA: ICD-10-CM

## 2021-02-23 PROCEDURE — 1036F TOBACCO NON-USER: CPT | Performed by: NURSE PRACTITIONER

## 2021-02-23 PROCEDURE — 3017F COLORECTAL CA SCREEN DOC REV: CPT | Performed by: NURSE PRACTITIONER

## 2021-02-23 PROCEDURE — 99214 OFFICE O/P EST MOD 30 MIN: CPT | Performed by: NURSE PRACTITIONER

## 2021-02-23 PROCEDURE — G8427 DOCREV CUR MEDS BY ELIG CLIN: HCPCS | Performed by: NURSE PRACTITIONER

## 2021-02-23 PROCEDURE — G8417 CALC BMI ABV UP PARAM F/U: HCPCS | Performed by: NURSE PRACTITIONER

## 2021-02-23 PROCEDURE — G8482 FLU IMMUNIZE ORDER/ADMIN: HCPCS | Performed by: NURSE PRACTITIONER

## 2021-02-23 RX ORDER — HYDROXYZINE PAMOATE 25 MG/1
CAPSULE ORAL
Qty: 90 CAPSULE | Refills: 2 | Status: SHIPPED | OUTPATIENT
Start: 2021-02-23 | End: 2022-04-13

## 2021-02-23 RX ORDER — PHENOL 1.4 %
AEROSOL, SPRAY (ML) MUCOUS MEMBRANE
COMMUNITY
Start: 2021-01-22 | End: 2021-07-19 | Stop reason: ALTCHOICE

## 2021-02-23 RX ORDER — METHOCARBAMOL 750 MG/1
TABLET ORAL WEEKLY
COMMUNITY
Start: 2021-01-22 | End: 2021-12-20

## 2021-02-23 RX ORDER — SPIRONOLACTONE 50 MG/1
TABLET, FILM COATED ORAL
Qty: 90 TABLET | Refills: 3 | Status: ON HOLD | OUTPATIENT
Start: 2021-02-23 | End: 2022-04-21 | Stop reason: HOSPADM

## 2021-02-23 RX ORDER — ERGOCALCIFEROL 1.25 MG/1
CAPSULE ORAL
Qty: 12 CAPSULE | Refills: 1 | Status: SHIPPED | OUTPATIENT
Start: 2021-02-23 | End: 2021-12-20

## 2021-02-23 RX ORDER — ATORVASTATIN CALCIUM 20 MG/1
TABLET, FILM COATED ORAL
Qty: 90 TABLET | Refills: 1 | Status: ON HOLD | OUTPATIENT
Start: 2021-02-23 | End: 2022-04-21 | Stop reason: HOSPADM

## 2021-02-23 ASSESSMENT — ENCOUNTER SYMPTOMS
WHEEZING: 0
COUGH: 0
NAUSEA: 0
ABDOMINAL DISTENTION: 0
GASTROINTESTINAL NEGATIVE: 1
BLOOD IN STOOL: 0
SHORTNESS OF BREATH: 0
RESPIRATORY NEGATIVE: 1
ABDOMINAL PAIN: 0

## 2021-02-23 ASSESSMENT — PATIENT HEALTH QUESTIONNAIRE - PHQ9
SUM OF ALL RESPONSES TO PHQ QUESTIONS 1-9: 0
SUM OF ALL RESPONSES TO PHQ9 QUESTIONS 1 & 2: 0
SUM OF ALL RESPONSES TO PHQ QUESTIONS 1-9: 0
SUM OF ALL RESPONSES TO PHQ QUESTIONS 1-9: 0
1. LITTLE INTEREST OR PLEASURE IN DOING THINGS: 0

## 2021-02-23 NOTE — PROGRESS NOTES
Visit Information    Have you changed or started any medications since your last visit including any over-the-counter medicines, vitamins, or herbal medicines? no   Have you stopped taking any of your medications? Is so, why? -  no  Are you having any side effects from any of your medications? - no    Have you seen any other physician or provider since your last visit? yes - GASTRO; PAIN MANAGEMENT; Have you had any other diagnostic tests since your last visit? yes - 12/24/2020 XR CHEST; US LIVER   Have you been seen in the emergency room and/or had an admission in a hospital since we last saw you?  yes - 02/12/2021   Have you had your routine dental cleaning in the past 6 months?  no     Do you have an active MyChart account? If no, what is the barrier?   Yes    Patient Care Team:  MASSIMO Vance CNP as PCP - General (Certified Nurse Practitioner)  MASSIMO Vance CNP as PCP - [facility]  Jackson Linqduist MD as Consulting Physician (Pain Management)  Ralf Ontiveros MD as Consulting Physician (Gastroenterology)    Medical History Review  Past Medical, Family, and Social History reviewed and does contribute to the patient presenting condition    Health Maintenance   Topic Date Due    Shingles Vaccine (1 of 2) 08/15/2009    Low dose CT lung screening  08/15/2014    Annual Wellness Visit (AWV)  01/07/2021    Lipid screen  07/03/2021    Potassium monitoring  12/30/2021    Creatinine monitoring  12/30/2021    Colon cancer screen colonoscopy  03/30/2023    DTaP/Tdap/Td vaccine (2 - Td) 04/06/2027    Hepatitis A vaccine  Completed    Hepatitis B vaccine  Completed    Flu vaccine  Completed    Pneumococcal 0-64 years Vaccine  Completed    Hib vaccine  Aged Out    Meningococcal (ACWY) vaccine  Aged Out    HIV screen  Discontinued

## 2021-02-23 NOTE — PROGRESS NOTES
[address]  Cori Kaur [facility] 2.  [address] [address]  Dept: [phone]  Dept Fax: [phone]    Chaparrita Barnard is a 63 y.o. male who presents today for his medical conditions/complaintsas noted below. Chaparrita Barnard is c/o of   Chief Complaint   Patient presents with    Results     ENDOSCOPY          HPI:     DEBBY Mcarthur is here today after getting diagnosis of adenocarcinoma of lower esophagus. He was hospitalized at the end of 2020 after having bloody emesis and melena. He was found to have esophageal varices that had ruptured with vomiting. He has had several EGD and EUS with Dr. Gina Harris for esophageal cancer staging. Beta blocker has been initiated for portal hypertension. Appetite is good. Quit drinking in December. He will see oncology next week. Uncertain if surgical intervention is a possibility due to history of alcoholic cirrhosis. Did spend a month at [address] [place] [address] after hospitalization. He did test positive for covid at that time. He denies that he had any indications of illness throughout this time. Left breast with painful nodule. Mammogram and us in 2017 were negative for concern.  He has recent increase in size and tenderness of the left breast. He has small marble size nodules in the right breast.   No results found for: LABA1C          ( goal A1Cis < 7)    No results found for: LABMICR  LDL Cholesterol (mg/dL)   Date Value   07/03/2020 103   02/02/2019 129   03/23/2017 87       (goal LDL is <100)   AST (U/L)   Date Value   12/30/2020 74 (H)     ALT (U/L)   Date Value   12/30/2020 26     BUN (mg/dL)   Date Value   12/30/2020 4 (L)     BP Readings from Last 3 Encounters:   02/23/21 126/82   02/17/21 105/66   02/12/21 104/89          (goal 120/80)    Past Medical History:   Diagnosis Date    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     Alcohol abuse     6-12 beers a day; quit drinking July 2016    Arthritis     Back pain, chronic      Hiral Gómez, orthopedic, every 3-4 months, gets steroid injection    Lezama esophagus     BPH (benign prostatic hypertrophy)     Cholelithiasis     [facility]osis ([facility]     COVID-19 12/2020    pt reports he tested + while at [facility] DDD (degenerative disc disease), lumbar     Esophageal varices ([facility]     Fatty liver     GERD (gastroesophageal reflux disease)     Hep C w/o coma, chronic ([facility]     History of colon polyps 2016    [place] (hard of hearing)     Hyperlipidemia     Hypertension     Stomach ulcer     hx of    Tobacco abuse     Tubular adenoma of colon 2016, 2018    Wears glasses       Past Surgical History:   Procedure Laterality Date    BUNIONECTOMY      twice on right side    BUNIONECTOMY Left     CARPAL TUNNEL RELEASE Right     COLONOSCOPY      at age 35    COLONOSCOPY  10/05/2016    polyps-pathology tubular adenoma, and abnormal looking mucosa right colon-pathology-tubular adenoma    COLONOSCOPY N/A 3/30/2018    COLONOSCOPY POLYPECTOMY COLD BIOPSY performed by Shirley Davis MD at [address] [place] [address]  03/30/2018    Small polyp in the sigmoid colon and excised with biopsy forceps--tubular adenoma    ENDOSCOPY, COLON, DIAGNOSTIC      EGD    KNEE SURGERY Left     cyst removed    NASAL SEPTUM SURGERY      NERVE BLOCK Right 11/23/2020    NERVE BLOCK RIGHT CERVICAL STEROID INJECTION  C3-C6 performed by Yessenia Morales MD at [address] [facility]  01/04/16    steroid injection C7 T1    OTHER SURGICAL HISTORY  11/21/2016    Bilateral Lumbar CACHORRO L4-L5 injections    OTHER SURGICAL HISTORY  12/19/2016    lumbar steroid injection    OTHER SURGICAL HISTORY  09/28/2018    BILATERAL L5 CACHORRO (N/A Back)    OTHER SURGICAL HISTORY Right 11/23/2020    cervical injection    PAIN MANAGEMENT PROCEDURE Left 7/9/2020    EPIDURAL STEROID INJECTION LEFT L4 L5 performed by Yessenia Morales MD at [facility] Left 7/20/2020    LEFT L4 L5 EPIDURAL STEROID INJECTION performed by Trupti Lockett MD at [facility] Bilateral 2020    LUMBAR FACET BILATERAL L2-L5 performed by Trupti Lockett MD at [address] [place] [address] Bilateral 2020    NERVE BLOCK BILATERAL LUMBAR MEDIAL BRANCH L2-L5 performed by Trupti Lockett MD at [address] AA&/STRD TFRML EPI LUMBAR/SACRAL 1 LEVEL Bilateral 2018    BILATERAL L5 CACHORRO performed by Trupti Lockett MD at [address] AA&/STRD TFRML EPI LUMBAR/SACRAL 1 LEVEL N/A 2018    BILATERAL L5 CACHORRO performed by Trupti Lockett MD at [facility] N/CARPAL TUNNEL SURG Right 2017    CARPAL TUNNEL RELEASE RIGHT performed by Jennifer Gurrola MD at [address] [place] [address] N/CARPAL TUNNEL SURG Left 10/31/2017    CARPAL TUNNEL RELEASE performed by Jennifer Gurrola MD at [place] [address]. N/A 2020    EGD BIOPSY performed by Donovan Timmons MD at [place] [address]. N/A 2021    EGD BIOPSY and spot marking performed by Berta Smallwood MD at [place] [address]. N/A 2021    ENDOSCOPIC ULTRASOUND, EGD performed by Eva Storey MD at [address] [place] [address]  2021    EGD DIAGNOSTIC ONLY performed by Eva Storey MD at [facility]       Family History   Problem Relation Age of Onset    Cancer Mother         pancreatic    Cancer Father         bone    Diabetes Sister     Asthma Brother        Social History     Tobacco Use    Smoking status: Former Smoker     Packs/day: 1.00     Years: 45.00     Pack years: 45.00     Quit date: 2017     Years since quittin.1    Smokeless tobacco: Never Used   Substance Use Topics    Alcohol use: No     Comment: 2016 he stopped drinking      Current Outpatient Medications   Medication Sig Dispense Refill    calcium carbonate 600 MG TABS tablet take 1 tablet by mouth once daily      chest pain and palpitations. Gastrointestinal: Negative. Negative for abdominal distention, abdominal pain, blood in stool and nausea. Musculoskeletal: Negative. Negative for arthralgias. Skin: Negative. Negative for pallor and rash. Neurological: Negative for syncope and headaches. Hematological: Negative. Psychiatric/Behavioral: Positive for dysphoric mood. Negative for sleep disturbance. The patient is nervous/anxious. Objective:     Physical Exam  Vitals signs reviewed. Constitutional:       Appearance: He is well-developed. He is obese. HENT:      Head: Normocephalic and atraumatic. Eyes:      Conjunctiva/sclera: Conjunctivae normal.   Neck:      Musculoskeletal: Normal range of motion. Cardiovascular:      Rate and Rhythm: Normal rate and regular rhythm. Heart sounds: Normal heart sounds. Pulmonary:      Effort: Pulmonary effort is normal.      Breath sounds: Normal breath sounds. Chest:      Breasts: Breasts are asymmetrical.         Right: No inverted nipple, mass, nipple discharge, skin change or tenderness. Left: Mass ( golf ball size firm mobile mass, slight tenderness) and tenderness present. No inverted nipple, nipple discharge or skin change. Musculoskeletal: Normal range of motion. Skin:     General: Skin is warm and dry. Neurological:      Mental Status: He is alert and oriented to person, place, and time. Psychiatric:         Mood and Affect: Mood normal.         Behavior: Behavior normal.         Thought Content: Thought content normal.         Judgment: Judgment normal.       /82 (Site: Left Upper Arm)   Pulse 72   Temp 96.9 °F (36.1 °C)   Ht 5' 10\" (1.778 m)   Wt 217 lb 6.4 oz (98.6 kg)   SpO2 95%   BMI 31.19 kg/m²     Assessment:       Diagnosis Orders   1. Gynecomastia  [facility] DIGITAL DIAGNOSTIC W OR WO CAD BILATERAL   2. Breast mass in male  [facility] DIGITAL DIAGNOSTIC W OR WO CAD BILATERAL   3.  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 Anxiety  hydrOXYzine (VISTARIL) 25 MG capsule   5. Pure hypercholesterolemia  atorvastatin (LIPITOR) 20 MG tablet   6. Vitamin D deficiency  vitamin D (ERGOCALCIFEROL) 1.25 MG (86369 UT) CAPS capsule   7. COVID-19     8. Need for prophylactic vaccination and inoculation against varicella               Plan:      Return in about 3 months (around 5/23/2021) for HTN. Orders Placed This Encounter   Procedures    [facility] DIGITAL DIAGNOSTIC W OR WO CAD BILATERAL     Standing Status:   Future     Standing Expiration Date:   4/23/2022     Orders Placed This Encounter   Medications    hydrOXYzine (VISTARIL) 25 MG capsule     Sig: take 1 capsule by mouth three times a day if needed for anxiety     Dispense:  90 capsule     Refill:  2    atorvastatin (LIPITOR) 20 MG tablet     Sig: take 1 tablet by mouth at bedtime     Dispense:  90 tablet     Refill:  1    spironolactone (ALDACTONE) 50 MG tablet     Sig: take 1 tablet by mouth once daily     Dispense:  90 tablet     Refill:  3    vitamin D (ERGOCALCIFEROL) 1.25 MG (58444 UT) CAPS capsule     Sig: take 1 capsule by mouth every week     Dispense:  12 capsule     Refill:  1    1. Gynecomastia    - [facility] DIGITAL DIAGNOSTIC W OR WO CAD BILATERAL; Future    2. Breast mass in male    - [facility] DIGITAL DIAGNOSTIC W OR WO CAD BILATERAL; Future    3. 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 Alpheus Nageotte on 3/3    4. Anxiety    - hydrOXYzine (VISTARIL) 25 MG capsule; take 1 capsule by mouth three times a day if needed for anxiety  Dispense: 90 capsule; Refill: 2    5. Pure hypercholesterolemia    - atorvastatin (LIPITOR) 20 MG tablet; take 1 tablet by mouth at bedtime  Dispense: 90 tablet; Refill: 1    6. Vitamin D deficiency    - vitamin D (ERGOCALCIFEROL) 1.25 MG (10713 UT) CAPS capsule; take 1 capsule by mouth every week  Dispense: 12 capsule; Refill: 1    7.  COVID-19  Was asymptomatic      8. Need for prophylactic vaccination and inoculation against varicella         Patient given educational materials - see patient instructions. Discussed use, benefit, and side effects of prescribed medications. All patientquestions answered. Pt voiced understanding. Reviewed health maintenance. Instructedto continue current medications, diet and exercise. Patient agreed with treatmentplan. Follow up as directed.      Electronically signed by MASSIMO Castorena CNP on 2/23/2021 at 8:57 PM

## 2021-03-02 ENCOUNTER — INITIAL CONSULT (OUTPATIENT)
Dept: ONCOLOGY | Age: 61
End: 2021-03-02
Payer: MEDICARE

## 2021-03-02 VITALS
DIASTOLIC BLOOD PRESSURE: 83 MMHG | WEIGHT: 220 LBS | OXYGEN SATURATION: 97 % | RESPIRATION RATE: 18 BRPM | TEMPERATURE: 97.8 F | HEART RATE: 61 BPM | SYSTOLIC BLOOD PRESSURE: 134 MMHG | BODY MASS INDEX: 31.57 KG/M2

## 2021-03-02 DIAGNOSIS — C80.1 ADENOCARCINOMA IN A POLYP (HCC): ICD-10-CM

## 2021-03-02 PROCEDURE — 99205 OFFICE O/P NEW HI 60 MIN: CPT | Performed by: INTERNAL MEDICINE

## 2021-03-02 PROCEDURE — G8417 CALC BMI ABV UP PARAM F/U: HCPCS | Performed by: INTERNAL MEDICINE

## 2021-03-02 PROCEDURE — 1036F TOBACCO NON-USER: CPT | Performed by: INTERNAL MEDICINE

## 2021-03-02 PROCEDURE — G8482 FLU IMMUNIZE ORDER/ADMIN: HCPCS | Performed by: INTERNAL MEDICINE

## 2021-03-02 PROCEDURE — 3017F COLORECTAL CA SCREEN DOC REV: CPT | Performed by: INTERNAL MEDICINE

## 2021-03-02 PROCEDURE — G8427 DOCREV CUR MEDS BY ELIG CLIN: HCPCS | Performed by: INTERNAL MEDICINE

## 2021-03-02 PROCEDURE — 99202 OFFICE O/P NEW SF 15 MIN: CPT | Performed by: INTERNAL MEDICINE

## 2021-03-02 NOTE — PROGRESS NOTES
Patient ID: Jamaica Mckeon, [birth year], [id number], 63 y.o. Referred by : Dr Rosaline Arguelles  Reason for consultation:   Esophageal cancer T2N0Mx  Stage II  HISTORY OF PRESENT ILLNESS:    Oncologic History:  Jamaica Mckeon is a 63 y.o. male with a history of hepatitis C, status post treatment, liver cirrhosis, history of alcohol abuse was seen during initial consultation visit for newly diagnosed esophageal cancer. Patient reportedly had \"heavy drinking alcohol in about 2016 however recently in December he had 2 beers and he presented with hematemesis. On 12/29/2020 he had upper endoscopy which showed severe portal hypertension, gastropathy. The biopsy from the GE junction showed invasive adenocarcinoma. Patient had a follow-up EGD on 2/2/2021 which confirmed esophageal adenocarcinoma. Patient had endoscopic ultrasound on 2/12/2021 which showed T2 N0 MX disease with underlying esophageal varices. In the esophagus hypoechoic lesion noted in the lower esophagus penetrating into submucosa and into muscularis propria. No lymphadenopathy noted. Patient was referred to medical oncology for further recommendations. He denies any difficulty swallowing. He does not smoke he has quit smoking in 2016. He has not drank alcohol since December. He has a history of hepatitis C and he has received treatment. He lives by himself. He is accompanied by his ex-wife during today's office visit.     Past Medical History:   Diagnosis Date    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 ([facility]     Alcohol abuse     6-12 beers a day; quit drinking July 2016    Arthritis     Back pain, chronic     dr. Micki Gay, orthopedic, every 3-4 months, gets steroid injection    Lezama esophagus     BPH (benign prostatic hypertrophy)     Cholelithiasis     Cirrhosis ([facility]     COVID-19 12/2020    pt reports he tested + while at [facility] DDD (degenerative disc disease), lumbar     Esophageal varices ([facility]     Fatty liver     GERD (gastroesophageal reflux disease)     Hep C w/o coma, chronic ([address]     History of colon polyps 2016    [place] (hard of hearing)     Hyperlipidemia     Hypertension     Stomach ulcer     hx of    Tobacco abuse     Tubular adenoma of colon 2016, 2018    Wears glasses        Past Surgical History:   Procedure Laterality Date    BUNIONECTOMY      twice on right side    BUNIONECTOMY Left     CARPAL TUNNEL RELEASE Right     COLONOSCOPY      at age 35    COLONOSCOPY  10/05/2016    polyps-pathology tubular adenoma, and abnormal looking mucosa right colon-pathology-tubular adenoma    COLONOSCOPY N/A 3/30/2018    COLONOSCOPY POLYPECTOMY COLD BIOPSY performed by Cyrus Quinonez MD at [address] [place] [address]  03/30/2018    Small polyp in the sigmoid colon and excised with biopsy forceps--tubular adenoma    ENDOSCOPY, COLON, DIAGNOSTIC      EGD    KNEE SURGERY Left     cyst removed    NASAL SEPTUM SURGERY      NERVE BLOCK Right 11/23/2020    NERVE BLOCK RIGHT CERVICAL STEROID INJECTION  C3-C6 performed by Xu Aranda MD at [facility]  01/04/16    steroid injection C7 T1    OTHER SURGICAL HISTORY  11/21/2016    Bilateral Lumbar CACHORRO L4-L5 injections    OTHER SURGICAL HISTORY  12/19/2016    lumbar steroid injection    OTHER SURGICAL HISTORY  09/28/2018    BILATERAL L5 CACHORRO (N/A Back)    OTHER SURGICAL HISTORY Right 11/23/2020    cervical injection    PAIN MANAGEMENT PROCEDURE Left 7/9/2020    EPIDURAL STEROID INJECTION LEFT L4 L5 performed by Xu Aranda MD at [facility] Left 7/20/2020    LEFT L4 L5 EPIDURAL STEROID INJECTION performed by Xu Aranda MD at [facility] Bilateral 8/17/2020    LUMBAR FACET BILATERAL L2-L5 performed by Xu Aranda MD at [facility] Bilateral 12/7/2020    NERVE BLOCK BILATERAL LUMBAR MEDIAL BRANCH L2-L5 performed by Xu Aranda MD at [address] AA&/STRD TFRML EPI LUMBAR/SACRAL 1 LEVEL Bilateral 9/6/2018    BILATERAL L5 CACHORRO performed by Michelle Alexis MD at [address] AA&/STRD TFRML EPI LUMBAR/SACRAL 1 LEVEL N/A 9/28/2018    BILATERAL L5 CACHORRO performed by Michelle Alexis MD at [facility] N/CARPAL TUNNEL SURG Right 8/29/2017    CARPAL TUNNEL RELEASE RIGHT performed by Nazia Danielle MD at [facility] N/CARPAL TUNNEL SURG Left 10/31/2017    CARPAL TUNNEL RELEASE performed by Nazia Danielle MD at [address] 12/29/2020    EGD BIOPSY performed by Tressa Clarke MD at [address] 2/2/2021    EGD BIOPSY and spot marking performed by Jensen Ceballos MD at [address] 2/12/2021    ENDOSCOPIC ULTRASOUND, EGD performed by Sarah Celeste MD at [address] [place] [address]  2/12/2021    EGD DIAGNOSTIC ONLY performed by Sarah Celeste MD at [facility]     No Known Allergies    Current Outpatient Medications   Medication Sig Dispense Refill    calcium carbonate 600 MG TABS tablet take 1 tablet by mouth once daily      D3-50 1.25 MG (40747 UT) CAPS take 1 capsule by mouth every FRIDAY      hydrOXYzine (VISTARIL) 25 MG capsule take 1 capsule by mouth three times a day if needed for anxiety 90 capsule 2    atorvastatin (LIPITOR) 20 MG tablet take 1 tablet by mouth at bedtime 90 tablet 1    spironolactone (ALDACTONE) 50 MG tablet take 1 tablet by mouth once daily 90 tablet 3    vitamin D (ERGOCALCIFEROL) 1.25 MG (15562 UT) CAPS capsule take 1 capsule by mouth every week 12 capsule 1    nadolol (CORGARD) 20 MG tablet Take 1 tablet by mouth daily 30 tablet 3    rifaximin (XIFAXAN) 550 MG tablet Take 1 tablet by mouth 3 times daily 42 tablet 0    pantoprazole (PROTONIX) 40 MG tablet Take 1 tablet by mouth every morning (before breakfast) 30 tablet 3    FLUoxetine (PROZAC) 20 MG capsule take 1 capsule by mouth once daily 90 capsule 1    sildenafil (VIAGRA) 100 MG tablet Take 1 tablet by mouth as needed for Erectile Dysfunction 10 tablet 3    ANDRODERM 4 MG/24HR PT24 APPLY 1 PATCH TO SHOULDER EVERY DAY       No current facility-administered medications for this visit.         Social History     Socioeconomic History    Marital status: Single     Spouse name: Not on file    Number of children: Not on file    Years of education: Not on file    Highest education level: Not on file   Occupational History    Not on file   Social Needs    Financial resource strain: Not on file    Food insecurity     Worry: Not on file     Inability: Not on file    Transportation needs     Medical: Not on file     Non-medical: Not on file   Tobacco Use    Smoking status: Former Smoker     Packs/day: 1.00     Years: 45.00     Pack years: 45.00     Quit date: 2017     Years since quittin.1    Smokeless tobacco: Never Used   Substance and Sexual Activity    Alcohol use: No     Comment: 2016 he stopped drinking    Drug use: No     Frequency: 1.0 times per week     Comment: cocaine,  stopped spring 2016    Sexual activity: Yes     Partners: Female   Lifestyle    Physical activity     Days per week: Not on file     Minutes per session: Not on file    Stress: Not on file   Relationships    Social connections     Talks on phone: Not on file     Gets together: Not on file     Attends Alevism service: Not on file     Active member of club or organization: Not on file     Attends meetings of clubs or organizations: Not on file     Relationship status: Not on file    Intimate partner violence     Fear of current or ex partner: Not on file     Emotionally abused: Not on file     Physically abused: Not on file     Forced sexual activity: Not on file   Other Topics Concern    Not on file   Social History Narrative     in the past, retired       Family History   Problem Relation Age of Onset organomegaly   Neurological - alert, oriented, normal speech, no focal findings or movement disorder noted   Musculoskeletal - no joint tenderness, deformity or swelling   Extremities - peripheral pulses normal, no pedal edema, no clubbing or cyanosis   Skin - normal coloration and turgor, no rashes, no suspicious skin lesions noted   LABORATORY DATA:     Lab Results   Component Value Date    WBC 4.3 12/30/2020    HGB 11.8 (L) 12/30/2020    HCT 36.2 (L) 12/30/2020    MCV 93.2 12/30/2020    PLT 97 (L) 12/30/2020    LYMPHOPCT 27 12/30/2020    RBC 3.89 (L) 12/30/2020    MCH 30.4 12/30/2020    MCHC 32.6 12/30/2020    RDW 16.6 (H) 12/30/2020    MONOPCT 7 12/30/2020    BASOPCT 0 12/30/2020    NEUTROABS 2.76 12/30/2020    LYMPHSABS 1.16 12/30/2020    MONOSABS 0.30 12/30/2020    EOSABS 0.04 12/30/2020    BASOSABS 0.00 12/30/2020       Chemistry        Component Value Date/Time     12/30/2020 0741    K 3.6 (L) 12/30/2020 0741     12/30/2020 0741    CO2 26 12/30/2020 0741    BUN 4 (L) 12/30/2020 0741    CREATININE 0.40 (L) 12/30/2020 0741        Component Value Date/Time    CALCIUM 7.8 (L) 12/30/2020 0741    ALKPHOS 110 12/30/2020 0741    AST 74 (H) 12/30/2020 0741    ALT 26 12/30/2020 0741    BILITOT 2.13 (H) 12/30/2020 0741        PATHOLOGY DATA:   Surgical Pathology Report  Surgical Pathology  Collected: 12/29/20 1334   Lab status: Final   Resulting lab: [address] LAB   Value: [id number]   [address]   [address] [facility], [address] [facility]   [phone]   Fax: [phone]   SURGICAL PATHOLOGY REPORT     Patient Name: Hanh Ferris   MR#: [id number]   Specimen #[id number]         Final Diagnosis   GASTROESOPHAGEAL JUNCTION, BIOPSY:          INVASIVE ADENOCARCINOMA    Final Diagnosis   ESOPHAGUS, LESION AT 34 CM, BIOPSY:          INVASIVE ADENOCARCINOMA ARISING IN TUBULAR ADENOMA WITH HIGH   GRADE DYSPLASIA, ASSOCIATED WITH FOCAL INTESTINAL METAPLASIA (SEE COMMENT)     Diagnosis Comment   The malignancy diagnosis is confirmed by review of a second   pathologist. Subhash Guevara result of HER2 IHC with reflex to [facility] for   gastroesophageal adenocarcinoma will be issued in an addendum.    ADDENDUM ([facility])     Date Ordered:     2/16/2021     Status: Signed Out        Date Complete:     2/16/2021     By: Marli Calloway M.D.        Date Reported:     2/16/2021       ADDENDUM COMMENT   This addendum is issued in order to incorporate the result of HER2 by   [facility], performed at [address] [place], [place] [address] ([id number], 02/16/2021).       \"RESULTS:  INDETERMINATE     Interpretation:     Average HER2 signals/nucleus:  4.4   Average SUNG 17 signals/nucleus:  3.4   HER2/SUNG 17 signal ratio:  1.3   Number of Observers:  2     Even after analysis of additional cells and review of slides by a   pathologist, FISH results show a HER2/SUNG 17 ratio < 2.0 and an   average of 4-6 HER2 signals per nucleus and 3 or more SUNG 17 signals   per nucleus.  The results are INDETERMINATE.       In this situation, the 2016 CAP/ASCP/ASCO guidelines recommend   selecting a different tumor block for HER2 testing, if available. \"       Of note, there is only one block available for testing of invasive   esophageal adenocarcinoma tumor cells.  It was already used for HER2   IHC and HER2 FISH testing with the results issued in the addendums.     IMAGING DATA:    Reviewed  ASSESSMENT:    Giuseppe Abebe is a 63 y.o. male with history of hepatitis C, liver cirrhosis, history of alcohol abuse, with esophageal cancer. In total, I spent greater than 80 minutes in face-to-face consultation with the patient and the majority of this time was spent in education, counseling, and coordination of care. During our encounter, I personally reviewed the above history and evaluation, including radiology and pathology data, in detail  I reviewed the NCCN guidelines and goals of care.   Clinically appears to have T2 N0 M0  Stag II,

## 2021-03-05 ENCOUNTER — TELEPHONE (OUTPATIENT)
Dept: ONCOLOGY | Age: 61
End: 2021-03-05

## 2021-03-05 ENCOUNTER — TELEPHONE (OUTPATIENT)
Dept: RADIATION ONCOLOGY | Age: 61
End: 2021-03-05

## 2021-03-05 NOTE — TELEPHONE ENCOUNTER
New referral in que from [address] [place] [address] Ln    CT ordered on 3/2/21 not scheduled yet awaiting CT to be completed for staging prior to scheduling rad onc consult. No diagnostic testing completed yet.

## 2021-03-05 NOTE — TELEPHONE ENCOUNTER
AVS from 3/2/21     Referral to surgeon at  of [facility] Don MD  Referral to radiation oncology  RTc 3 weeks  Nurse amirah    Faxed referral to  of [facility], confirmation rcv'd  Radiation will callpt  rv scheduled for 3/23/21 @ 3:30p  CT put in after pt left, call and message left for pt to call scheduling [phone] to schedule CT scan  Navigation referral put in system - they will call pt    Pt was given AVS and appt schedule

## 2021-03-08 NOTE — TELEPHONE ENCOUNTER
Pt called back to schedule consult  Pt scheduled for CT scan 3/12 @nelly  Pt scheduled for rad onc consult 3/18/21 @ pt given instructions including he will see Dr. Charlie Srinivasan for consult.

## 2021-03-11 ENCOUNTER — OFFICE VISIT (OUTPATIENT)
Dept: FAMILY MEDICINE CLINIC | Age: 61
End: 2021-03-11
Payer: MEDICARE

## 2021-03-11 VITALS
HEART RATE: 74 BPM | WEIGHT: 219.2 LBS | TEMPERATURE: 98.2 F | BODY MASS INDEX: 31.38 KG/M2 | SYSTOLIC BLOOD PRESSURE: 126 MMHG | DIASTOLIC BLOOD PRESSURE: 78 MMHG | HEIGHT: 70 IN | RESPIRATION RATE: 14 BRPM | OXYGEN SATURATION: 97 %

## 2021-03-11 DIAGNOSIS — Z00.00 ROUTINE GENERAL MEDICAL EXAMINATION AT A HEALTH CARE FACILITY: Primary | ICD-10-CM

## 2021-03-11 PROCEDURE — G8482 FLU IMMUNIZE ORDER/ADMIN: HCPCS | Performed by: NURSE PRACTITIONER

## 2021-03-11 PROCEDURE — G0438 PPPS, INITIAL VISIT: HCPCS | Performed by: NURSE PRACTITIONER

## 2021-03-11 PROCEDURE — 3017F COLORECTAL CA SCREEN DOC REV: CPT | Performed by: NURSE PRACTITIONER

## 2021-03-11 SDOH — ECONOMIC STABILITY: FOOD INSECURITY: WITHIN THE PAST 12 MONTHS, THE FOOD YOU BOUGHT JUST DIDN'T LAST AND YOU DIDN'T HAVE MONEY TO GET MORE.: NEVER TRUE

## 2021-03-11 SDOH — ECONOMIC STABILITY: TRANSPORTATION INSECURITY
IN THE PAST 12 MONTHS, HAS THE LACK OF TRANSPORTATION KEPT YOU FROM MEDICAL APPOINTMENTS OR FROM GETTING MEDICATIONS?: NO

## 2021-03-11 SDOH — ECONOMIC STABILITY: FOOD INSECURITY: WITHIN THE PAST 12 MONTHS, YOU WORRIED THAT YOUR FOOD WOULD RUN OUT BEFORE YOU GOT MONEY TO BUY MORE.: NEVER TRUE

## 2021-03-11 SDOH — ECONOMIC STABILITY: INCOME INSECURITY: HOW HARD IS IT FOR YOU TO PAY FOR THE VERY BASICS LIKE FOOD, HOUSING, MEDICAL CARE, AND HEATING?: NOT HARD AT ALL

## 2021-03-11 SDOH — ECONOMIC STABILITY: TRANSPORTATION INSECURITY
IN THE PAST 12 MONTHS, HAS LACK OF TRANSPORTATION KEPT YOU FROM MEETINGS, WORK, OR FROM GETTING THINGS NEEDED FOR DAILY LIVING?: NO

## 2021-03-11 ASSESSMENT — PATIENT HEALTH QUESTIONNAIRE - PHQ9
1. LITTLE INTEREST OR PLEASURE IN DOING THINGS: 0
SUM OF ALL RESPONSES TO PHQ QUESTIONS 1-9: 0
2. FEELING DOWN, DEPRESSED OR HOPELESS: 0
SUM OF ALL RESPONSES TO PHQ QUESTIONS 1-9: 0
SUM OF ALL RESPONSES TO PHQ9 QUESTIONS 1 & 2: 0

## 2021-03-11 NOTE — PATIENT INSTRUCTIONS
Learning About Living Perroy  What is a living will? A living will, also called a declaration, is a legal form. It tells your family and your doctor your wishes when you can't speak for yourself. It's used by the health professionals who will treat you as you near the end of your life or if you get seriously hurt or ill. If you put your wishes in writing, your loved ones and others will know what kind of care you want. They won't need to guess. This can ease your mind and be helpful to others. And you can change or cancel your living will at any time. A living will is not the same as an estate or property will. An estate will explains what you want to happen with your money and property after you die. How do you use it? A living will is used to describe the kinds of treatment or life support you want as you near the end of your life or if you get seriously hurt or ill. Keep these facts in mind about living loredo. · Your living will is used only if you can't speak or make decisions for yourself. Most often, one or more doctors must certify that you can't speak or decide for yourself before your living will takes effect. · If you get better and can speak for yourself again, you can accept or refuse any treatment. It doesn't matter what you said in your living will. · Some states may limit your right to refuse treatment in certain cases. For example, you may need to clearly state in your living will that you don't want artificial hydration and nutrition, such as being fed through a tube. Is a living will a legal document? A living will is a legal document. Each state has its own laws about living loredo. And a living will may be called something else in your state. Here are some things to know about living loredo. · You don't need an  to complete a living will. But legal advice can be helpful if your state's laws are unclear.  It can also help if your health history is complicated or your family can't agree on what should be in your living will. · You can change your living will at any time. Some people find that their wishes about end-of-life care change as their health changes. If you make big changes to your living will, complete a new form. · If you move to another state, make sure that your living will is legal in the state where you now live. In most cases, doctors will respect your wishes even if you have a form from a different state. · You might use a universal form that has been approved by many states. This kind of form can sometimes be filled out and stored online. Your digital copy will then be available wherever you have a connection to the internet. The doctors and nurses who need to treat you can find it right away. · Your state may offer an online registry. This is another place where you can store your living will online. · It's a good idea to get your living will notarized. This means using a person called a  to watch two people sign, or witness, your living will. What should you know when you create a living will? Here are some questions to ask yourself as you make your living will:  · Do you know enough about life support methods that might be used? If not, talk to your doctor so you know what might be done if you can't breathe on your own, your heart stops, or you can't swallow. · What things would you still want to be able to do after you receive life-support methods? Would you want to be able to walk? To speak? To eat on your own? To live without the help of machines? · Do you want certain Rastafarian practices performed if you become very ill? · If you have a choice, where do you want to be cared for? In your home? At a hospital or nursing home? · If you have a choice at the end of your life, where would you prefer to die? At home? In a hospital or nursing home? Somewhere else? · Would you prefer to be buried or cremated?   · Do you want your organs to be donated after you die? What should you do with your living will? · Make sure that your family members and your health care agent have copies of your living will (also called a declaration). · Give your doctor a copy of your living will. Ask him or her to keep it as part of your medical record. If you have more than one doctor, make sure that each one has a copy. · Put a copy of your living will where it can be easily found. For example, some people may put a copy on their refrigerator door. If you are using a digital copy, be sure your doctor, family members, and health care agent know how to find and access it. Where can you learn more? Go to https://ReqSpot.compeBullGuardeweb.Paradial. org and sign in to your Cardize account. Enter E898 in the Telelogos box to learn more about \"Learning About Living Perroy. \"     If you do not have an account, please click on the \"Sign Up Now\" link. Current as of: July 17, 2020               Content Version: 12.8  © 2006-2021 [employer]. Care instructions adapted under license by [facility] ([facility]). If you have questions about a medical condition or this instruction, always ask your healthcare professional. [place] [address] any warranty or liability for your use of this information. Eating Healthy Foods: Care Instructions  Your Care Instructions     Eating healthy foods can help lower your risk for disease. Healthy food gives you energy and keeps your heart strong, your brain active, your muscles working, and your bones strong. A healthy diet includes a variety of foods from the basic food groups: grains, vegetables, fruits, milk and milk products, and meat and beans. Some people may eat more of their favorite foods from only one food group and, as a result, miss getting the nutrients they need. So, it is important to pay attention not only to what you eat but also to what you are missing from your diet.  You can eat a healthy, balanced diet by making a few small changes. Follow-up care is a key part of your treatment and safety. Be sure to make and go to all appointments, and call your doctor if you are having problems. It's also a good idea to know your test results and keep a list of the medicines you take. How can you care for yourself at home? Look at what you eat  · Keep a food diary for a week or two and record everything you eat or drink. Track the number of servings you eat from each food group. · For a balanced diet every day, eat a variety of:  ? 6 or more ounce-equivalents of grains, such as cereals, breads, crackers, rice, or pasta, every day. An ounce-equivalent is 1 slice of bread, 1 cup of ready-to-eat cereal, or ½ cup of cooked rice, cooked pasta, or cooked cereal.  ? 2½ cups of vegetables, especially:  § Dark-green vegetables such as broccoli and spinach. § Orange vegetables such as carrots and sweet potatoes. § Dry beans (such as loo and kidney beans) and peas (such as lentils). ? 2 cups of fresh, frozen, or canned fruit. A small apple or 1 banana or orange equals 1 cup. ? 3 cups of nonfat or low-fat milk, yogurt, or other milk products. ? 5½ ounces of meat and beans, such as chicken, fish, lean meat, beans, nuts, and seeds. One egg, 1 tablespoon of peanut butter, ½ ounce nuts or seeds, or ¼ cup of cooked beans equals 1 ounce of meat. · Learn how to read food labels for serving sizes and ingredients. Fast-food and convenience-food meals often contain few or no fruits or vegetables. Make sure you eat some fruits and vegetables to make the meal more nutritious. · Look at your food diary. For each food group, add up what you have eaten and then divide the total by the number of days. This will give you an idea of how much you are eating from each food group. See if you can find some ways to change your diet to make it more healthy. Start small  · Do not try to make dramatic changes to your diet all at once. You might feel that you are missing out on your favorite foods and then be more likely to fail. · Start slowly, and gradually change your habits. Try some of the following:  ? Use whole wheat bread instead of white bread. ? Use nonfat or low-fat milk instead of whole milk. ? Eat brown rice instead of white rice, and eat whole wheat pasta instead of white-flour pasta. ? Try low-fat cheeses and low-fat yogurt. ? Add more fruits and vegetables to meals and have them for snacks. ? Add lettuce, tomato, cucumber, and onion to sandwiches. ? Add fruit to yogurt and cereal.  Enjoy food  · You can still eat your favorite foods. You just may need to eat less of them. If your favorite foods are high in fat, salt, and sugar, limit how often you eat them, but do not cut them out entirely. · Eat a wide variety of foods. Make healthy choices when eating out  · The type of restaurant you choose can help you make healthy choices. Even fast-food chains are now offering more low-fat or healthier choices on the menu. · Choose smaller portions, or take half of your meal home. · When eating out, try:  ? A veggie pizza with a whole wheat crust or grilled chicken (instead of sausage or pepperoni). ? Pasta with roasted vegetables, grilled chicken, or marinara sauce instead of cream sauce. ? A vegetable wrap or grilled chicken wrap. ? Broiled or poached food instead of fried or breaded items. Make healthy choices easy  · Buy packaged, prewashed, ready-to-eat fresh vegetables and fruits, such as baby carrots, salad mixes, and chopped or shredded broccoli and cauliflower. · Buy packaged, presliced fruits, such as melon or pineapple. · Choose 100% fruit or vegetable juice instead of soda. Limit juice intake to 4 to 6 oz (½ to ¾ cup) a day. · Blend low-fat yogurt, fruit juice, and canned or frozen fruit to make a smoothie for breakfast or a snack. Where can you learn more? Go to https://albino.health-partners. org and sign vitamin D per day. It is possible to meet your calcium requirement with diet alone, but a vitamin D supplement is usually necessary to meet this goal.  · When exposed to the sun, use a sunscreen that protects against both UVA and UVB radiation with an SPF of 30 or greater. Reapply every 2 to 3 hours or after sweating, drying off with a towel, or swimming. · Always wear a seat belt when traveling in a car. Always wear a helmet when riding a bicycle or motorcycle.

## 2021-03-11 NOTE — PROGRESS NOTES
Medicare Annual Wellness Visit  Name: Lynda Ball Date: 3/11/2021   MRN: [id number] Sex: Male   Age: 63 y.o. Ethnicity: Non-/Non    : 1960 Race: Radha Odom is here for Medicare AWV    Screenings for behavioral, psychosocial and functional/safety risks, and cognitive dysfunction are all negative except as indicated below. These results, as well as other patient data from the [address] form, are documented in Flowsheets linked to this Encounter. No Known Allergies    Prior to Visit Medications    Medication Sig Taking?  Authorizing Provider   calcium carbonate 600 MG TABS tablet take 1 tablet by mouth once daily Yes Historical Provider, MD   D3-50 1.25 MG (08752 UT) CAPS take 1 capsule by mouth every FRIDAY Yes Historical Provider, MD   hydrOXYzine (VISTARIL) 25 MG capsule take 1 capsule by mouth three times a day if needed for anxiety Yes MASSIMO Coleman CNP   atorvastatin (LIPITOR) 20 MG tablet take 1 tablet by mouth at bedtime Yes MASSIMO Coleman CNP   spironolactone (ALDACTONE) 50 MG tablet take 1 tablet by mouth once daily Yes MASSIMO Coleman CNP   vitamin D (ERGOCALCIFEROL) 1.25 MG (83143 UT) CAPS capsule take 1 capsule by mouth every week Yes MASSIMO Coleman CNP   nadolol (CORGARD) 20 MG tablet Take 1 tablet by mouth daily Yes Dale Milner MD   rifaximin (XIFAXAN) 550 MG tablet Take 1 tablet by mouth 3 times daily Yes Dale Milner MD   pantoprazole (PROTONIX) 40 MG tablet Take 1 tablet by mouth every morning (before breakfast) Yes Dale Milner MD   FLUoxetine (PROZAC) 20 MG capsule take 1 capsule by mouth once daily Yes MASSIMO Coleman CNP   ANDRODERM 4 MG/24HR PT24 APPLY 1 PATCH TO SHOULDER EVERY DAY Yes Historical Provider, MD   sildenafil (VIAGRA) 100 MG tablet Take 1 tablet by mouth as needed for Erectile Dysfunction Yes MASSIMO Dumont CNP       Past Medical History:   Diagnosis Date    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 ([facility]     Alcohol abuse     6-12 beers a day; quit drinking July 2016    Arthritis     Back pain, chronic     dr. Dong Harding, orthopedic, every 3-4 months, gets steroid injection    Lezama esophagus     BPH (benign prostatic hypertrophy)     Cholelithiasis     [facility]     COVID-19 12/2020    pt reports he tested + [facility] DDD (degenerative disc disease), lumbar     Esophageal varices ([facility]     Fatty liver     GERD (gastroesophageal reflux disease)     Hep C w/o coma, chronic ([facility]     History of colon polyps 2016    [place] (hard of hearing)     Hyperlipidemia     Hypertension     Stomach ulcer     hx of    Tobacco abuse     Tubular adenoma of colon 2016, 2018    Wears glasses        Past Surgical History:   Procedure Laterality Date    BUNIONECTOMY      twice on right side    BUNIONECTOMY Left     CARPAL TUNNEL RELEASE Right     COLONOSCOPY      at age 35    COLONOSCOPY  10/05/2016    polyps-pathology tubular adenoma, and abnormal looking mucosa right colon-pathology-tubular adenoma    COLONOSCOPY N/A 3/30/2018    COLONOSCOPY POLYPECTOMY COLD BIOPSY performed by Magui Logan MD at [place] [address]  03/30/2018    Small polyp in the sigmoid colon and excised with biopsy forceps--tubular adenoma    ENDOSCOPY, COLON, DIAGNOSTIC      EGD    KNEE SURGERY Left     cyst removed    NASAL SEPTUM SURGERY      NERVE BLOCK Right 11/23/2020    NERVE BLOCK RIGHT CERVICAL STEROID INJECTION  C3-C6 performed by Kassi Ford MD at [address]  01/04/16    steroid injection C7 T1    OTHER SURGICAL HISTORY  11/21/2016    Bilateral Lumbar CACHORRO L4-L5 injections    OTHER SURGICAL HISTORY  12/19/2016    lumbar steroid injection    OTHER SURGICAL HISTORY  09/28/2018    BILATERAL L5 CACHORRO (N/A Back)    OTHER SURGICAL HISTORY Right 11/23/2020    cervical injection    PAIN MANAGEMENT PROCEDURE Left 7/9/2020    EPIDURAL STEROID Management)  Vincent Rodriguez MD as Consulting Physician (Gastroenterology)    Wt Readings from Last 3 Encounters:   03/11/21 219 lb 3.2 oz (99.4 kg)   03/02/21 220 lb (99.8 kg)   02/23/21 217 lb 6.4 oz (98.6 kg)     Vitals:    03/11/21 1441   BP: 126/78   Pulse: 74   Resp: 14   Temp: 98.2 °F (36.8 °C)   SpO2: 97%   Weight: 219 lb 3.2 oz (99.4 kg)   Height: 5' 10\" (1.778 m)     Body mass index is 31.45 kg/m². Based upon direct observation of the patient, evaluation of cognition reveals remote memory intact, recent memory impaired. Patient's complete Health Risk Assessment and screening values have been reviewed and are found in Flowsheets. The following problems were reviewed today and where indicated follow up appointments were made and/or referrals ordered. Positive Risk Factor Screenings with Interventions:     Fall Risk:  Timed Up and Go Test > 12 seconds? (Complete if either Fall Risk answers are Yes): (!) yes  2 or more falls in past year?: (!) yes  Fall with injury in past year?: no  Fall Risk Interventions:    · Home safety tips provided    Cognitive: Words recalled: 0 Words Recalled  Clock Drawing Test (CDT) Score: (!) Abnormal  Total Score Interpretation: Positive Mini-Cog  Did the patient refuse to take the cognition test?: No  Cognitive Impairment Interventions:  · Patient declines any further evaluation/treatment for cognitive impairment       General Health and ACP:  General  In general, how would you say your health is?: Good  In the past 7 days, have you experienced any of the following?  New or Increased Pain, New or Increased Fatigue, Loneliness, Social Isolation, Stress or Anger?: None of These  Do you get the social and emotional support that you need?: Yes  Do you have a Living Will?: Yes  Advance Directives     Power of [facility] Will ACP-Advance Directive ACP-Power of     Not on File Not on File Not on File Not on [facility] Interventions:  · NA    Health Habits/Nutrition:  Health Habits/Nutrition  Do you exercise for at least 20 minutes 2-3 times per week?: Yes  Have you lost any weight without trying in the past 3 months?: (!) Yes  Do you eat only one meal per day?: (!) Yes  Have you seen the dentist within the past year?: (!) No  Body mass index: (!) 31.45  Health Habits/Nutrition Interventions:  · Dental exam overdue:  patient encouraged to make appointment with his/her dentist    Hearing/Vision:  No exam data present  Hearing/Vision  Do you or your family notice any trouble with your hearing that hasn't been managed with hearing aids?: (!) Yes  Do you have difficulty driving, watching TV, or doing any of your daily activities because of your eyesight?: No  Have you had an eye exam within the past year?: Yes  Hearing/Vision Interventions:  · Hearing concerns:  [facility]    Safety:  Safety  Do you have working smoke detectors?: Yes  Have all throw rugs been removed or fastened?: (!) No  Do you have non-slip mats or surfaces in all bathtubs/showers?: (!) No  Do all of your stairways have a railing or banister?: Yes  Are your doorways, halls and stairs free of clutter?: (!) No  Do you always fasten your seatbelt when you are in a car?: Yes  Safety Interventions:  · Home safety tips provided     Personalized Preventive Plan   Current Health Maintenance Status  Immunization History   Administered Date(s) Administered    Hepatitis A 09/20/2016, 03/29/2017    Hepatitis B (Recombivax HB) 09/20/2016, 10/19/2016, 03/29/2017    Influenza 11/29/2012    Influenza, Quadv, IM, (6 mo and older Fluzone, Flulaval, Fluarix and 3 yrs and older Afluria) 09/13/2017, 01/24/2019    Influenza, Quadv, IM, PF (6 mo and older Fluzone, Flulaval, Fluarix, and 3 yrs and older Afluria) 09/13/2016, 12/30/2020    Pneumococcal Polysaccharide ([id number]) 11/29/2012, 07/25/2016    Tdap (Boostrix, Adacel) 04/06/2017        Health Maintenance   Topic Date Due    COVID-19 Vaccine (1 of 2) Never done    Low dose CT lung screening  Never done   [employer] Visit (AWV)  Never done    Shingles Vaccine (1 of 2) 02/23/2022 (Originally 8/15/2009)    Lipid screen  07/03/2021    Potassium monitoring  12/30/2021    Creatinine monitoring  12/30/2021    Colon cancer screen colonoscopy  03/30/2023    DTaP/Tdap/Td vaccine (2 - Td) 04/06/2027    Hepatitis A vaccine  Completed    Hepatitis B vaccine  Completed    Flu vaccine  Completed    Pneumococcal 0-64 years Vaccine  Completed    Hib vaccine  Aged Out    Meningococcal (ACWY) vaccine  Aged Out    HIV screen  Discontinued     Recommendations for Preventive Services Due: see orders and patient instructions/AVS.  . Recommended screening schedule for the next 5-10 years is provided to the patient in written form: see Patient Instructions/AVS.    There are no diagnoses linked to this encounter. Advance Care Planning   Advanced Care Planning: Discussed the patients choices for care and treatment in case of a health event that adversely affects decision-making abilities. Also discussed the patients long-term treatment options. Reviewed with the patient the [address] [place] [address] of [address] [place] [address] Declaration forms  Reviewed the process of designating a competent adult as an Agent (or -in-fact) that could take make health care decisions for the patient if incompetent. Patient was asked to complete the declaration forms, either acknowledge the forms by a public notary or an eligible witness and provide a signed copy to the practice office. Time spent (minutes): 10     Cardiovascular Disease Risk Counseling: Assessed the patient's risk to develop cardiovascular disease and reviewed main risk factors.    Reviewed steps to reduce disease risk including:   · Quitting tobacco use, reducing amount smoked, or not starting the habit  · Making healthy food

## 2021-03-12 ENCOUNTER — HOSPITAL ENCOUNTER (OUTPATIENT)
Dept: CT IMAGING | Age: 61
Discharge: HOME | End: 2021-03-14
Payer: MEDICARE

## 2021-03-12 DIAGNOSIS — C80.1 ADENOCARCINOMA IN A POLYP (HCC): ICD-10-CM

## 2021-03-12 LAB
BUN BLDV-MCNC: 3 MG/DL (ref 8–23)
CREAT SERPL-MCNC: 0.58 MG/DL (ref 0.7–1.2)
GFR AFRICAN AMERICAN: >60 ML/MIN
GFR NON-AFRICAN AMERICAN: >60 ML/MIN
GFR SERPL CREATININE-BSD FRML MDRD: ABNORMAL ML/MIN/{1.73_M2}
GFR SERPL CREATININE-BSD FRML MDRD: ABNORMAL ML/MIN/{1.73_M2}

## 2021-03-12 PROCEDURE — 82565 ASSAY OF CREATININE: CPT

## 2021-03-12 PROCEDURE — 36415 COLL VENOUS BLD VENIPUNCTURE: CPT

## 2021-03-12 PROCEDURE — 6360000004 HC RX CONTRAST MEDICATION: Performed by: INTERNAL MEDICINE

## 2021-03-12 PROCEDURE — 2580000003 HC RX 258: Performed by: INTERNAL MEDICINE

## 2021-03-12 PROCEDURE — 74177 CT ABD & PELVIS W/CONTRAST: CPT

## 2021-03-12 PROCEDURE — 84520 ASSAY OF UREA NITROGEN: CPT

## 2021-03-12 RX ORDER — SODIUM CHLORIDE 0.9 % (FLUSH) 0.9 %
10 SYRINGE (ML) INJECTION PRN
Status: DISCONTINUED | OUTPATIENT
Start: 2021-03-12 | End: 2021-03-15 | Stop reason: HOSPADM

## 2021-03-12 RX ORDER — 0.9 % SODIUM CHLORIDE 0.9 %
80 INTRAVENOUS SOLUTION INTRAVENOUS ONCE
Status: COMPLETED | OUTPATIENT
Start: 2021-03-12 | End: 2021-03-12

## 2021-03-12 RX ADMIN — IOPAMIDOL 75 ML: 755 INJECTION, SOLUTION INTRAVENOUS at 15:55

## 2021-03-12 RX ADMIN — Medication 10 ML: at 15:55

## 2021-03-12 RX ADMIN — IOHEXOL 50 ML: 240 INJECTION, SOLUTION INTRATHECAL; INTRAVASCULAR; INTRAVENOUS; ORAL at 15:54

## 2021-03-12 RX ADMIN — SODIUM CHLORIDE 80 ML: 9 INJECTION, SOLUTION INTRAVENOUS at 15:55

## 2021-03-16 ENCOUNTER — TELEPHONE (OUTPATIENT)
Dept: ONCOLOGY | Age: 61
End: 2021-03-16

## 2021-03-16 NOTE — TELEPHONE ENCOUNTER
As of 3/16/21 chart prep. Patient is not scheduled with [facility] yet. No referral was ever put in for navigation as noted on check out note from 3/5/21 nor was a navigator notified. Today 3/16/21 I notified navigators.

## 2021-03-17 NOTE — CONSULTS
[facility]            Radiation Oncology          [address] [facility]          [address]        Melecio Alt: [phone]        F: [phone]       AlterG           3/18/2021    Patient: Caitlyn Slater   YOB: 1960       Dear Dr Odessa Payan: Thank you for referring Caitlyn Slater to me for evaluation. Below are the relevant portions of my assessment and plan of care. If you have questions, please do not hesitate to call me. I look forward to following this patient along with you. Sincerely,    Electronically signed by Mary Kate Camejo MD on 3/17/21 at 10:24 AM EDT    CC: Patient Care Team:  MASSIMO Mcdaniel CNP as PCP - General (Certified Nurse Practitioner)  MASSIMO Mcdaniel CNP as PCP - [facility]  Shyla Mann MD as Consulting Physician (Pain Management)  Heri King MD as Consulting Physician (Gastroenterology)  Bev Long RN as Nurse Navigator (Oncology)  ------------------------------------------------------------------------------------------------------------------------------------------------------------------------------------------      RADIATION ONCOLOGY NEW PATIENT VISIT:    Date of Service: 3/18/2021    Location:  [address] [place] [address],   [address] [place] [address]., [place]   [phone]     Patient ID:   Caitlyn Slater  : 1960   MRN: [id number]    CHIEF COMPLAINT: \" He is here today regarding his recently diagnosed esophageal cancer. 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 male with a history of hepatitis C, liver cirrhosis and alcohol abuse. He presented in December with hematemisis. 2020 - upper endoscopy  Showed severe portal hypertension, gastropathy. A biopsy from the GE junction showed invasive adenocarcinoma. Surgical Pathology Report  Surgical Pathology  Collected: 12/29/20 [id number]   Lab status: Final   Resulting lab: [facility]   Value:     Final Diagnosis   GASTROESOPHAGEAL JUNCTION, BIOPSY:          INVASIVE ADENOCARCINOMA    Final Diagnosis   ESOPHAGUS, LESION AT 34 CM, BIOPSY:          INVASIVE ADENOCARCINOMA ARISING IN TUBULAR ADENOMA WITH HIGH   GRADE DYSPLASIA, ASSOCIATED WITH FOCAL INTESTINAL METAPLASIA (SEE   COMMENT)     Diagnosis Comment   The malignancy diagnosis is confirmed by review of a second pathologist. Karla Cooler result of HER2 IHC with reflex to [facility] for gastroesophageal adenocarcinoma will be issued in an addendum.          ADDENDUM COMMENT   This addendum is issued in order to incorporate the result of HER2 by   [address] [place] [address], performed at [address] [place] [address],Third Floor ([id number], 02/16/2021).       \"RESULTS: Dutch Herron        2/12/2021 -endoscopic ultrasound   EGD Findings[de-identified]   Esophagus: There is esophageal cancer at 38 cm from incisors with small ulcerated area with underlying Lezama's and underlying varices. The esophagus was otherwise normal.  Stomach: Stomach had small sliding hiatal hernia. Stomach also had mild to moderate portal hypertensive gastropathy in the fundus and body of the stomach. Stomach was otherwise normal.  Duodenum: normal     EUS findings:  Esophagus: There was a hypoechoic lesion in the lower esophagus around 38 cm from incisors which appeared to be penetrating into the submucosa and into muscularis propria. There were esophageal varices around this lesion. There was no periesophageal lymphadenopathy. 3/12/2021 - CT chest, abdomen, pelvis  Impression   No acute thoracic, abdominal, or pelvic abnormality.       No pathologic adenopathy.       Nodular hepatic border consistent with a degree of cirrhosis.       Splenomegaly that may relate to a degree of portal hypertension.            He is here today to review treatment.   He has been referred to the [facility] to discuss surgery.     PRIOR RADIATION HISTORY:  No prior history of radiation therapy    PACEMAKER: None    PAST MEDICAL HISTORY:  Past Medical History:   Diagnosis Date    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 ([facility] [address]     Alcohol abuse     6-12 beers a day; quit drinking July 2016    Arthritis     Back pain, chronic     dr. Jony Pereyra, orthopedic, every 3-4 months, gets steroid injection    Lezama esophagus     BPH (benign prostatic hypertrophy)     Cholelithiasis     Cirrhosis ([facility] [address]     COVID-19 12/2020    pt reports he tested + while at [employer] DDD (degenerative disc disease), lumbar     Esophageal varices ([facility] [address]     Fatty liver     GERD (gastroesophageal reflux disease)     Hep C w/o coma, chronic ([facility] [address]     History of colon polyps 2016    [place] (hard of hearing)     Hyperlipidemia     Hypertension     Stomach ulcer     hx of    Tobacco abuse     Tubular adenoma of colon 2016, 2018    Wears glasses        PAST SURGICAL HISTORY:  Past Surgical History:   Procedure Laterality Date    BUNIONECTOMY      twice on right side    BUNIONECTOMY Left     CARPAL TUNNEL RELEASE Right     COLONOSCOPY      at age 35    COLONOSCOPY  10/05/2016    polyps-pathology tubular adenoma, and abnormal looking mucosa right colon-pathology-tubular adenoma    COLONOSCOPY N/A 3/30/2018    COLONOSCOPY POLYPECTOMY COLD BIOPSY performed by Candelario Kenney MD at [address] [place] [address]  03/30/2018    Small polyp in the sigmoid colon and excised with biopsy forceps--tubular adenoma    ENDOSCOPY, COLON, DIAGNOSTIC      EGD    KNEE SURGERY Left     cyst removed    NASAL SEPTUM SURGERY      NERVE BLOCK Right 11/23/2020    NERVE BLOCK RIGHT CERVICAL STEROID INJECTION  C3-C6 performed by Maxi Olguin MD at [address] [place] [address]  01/04/16    steroid injection C7 T1    OTHER SURGICAL HISTORY  11/21/2016    Bilateral Lumbar CACHORRO L4-L5 injections    OTHER SURGICAL HISTORY  12/19/2016 lumbar steroid injection    OTHER SURGICAL HISTORY  09/28/2018    BILATERAL L5 CACHORRO (N/A Back)    OTHER SURGICAL HISTORY Right 11/23/2020    cervical injection    PAIN MANAGEMENT PROCEDURE Left 7/9/2020    EPIDURAL STEROID INJECTION LEFT L4 L5 performed by Maxi Olguin MD at [facility] Left 7/20/2020    LEFT L4 L5 EPIDURAL STEROID INJECTION performed by Maxi Olguin MD at [facility] Bilateral 8/17/2020    LUMBAR FACET BILATERAL L2-L5 performed by Maxi Olguin MD at [facility] Bilateral 12/7/2020    NERVE BLOCK BILATERAL LUMBAR MEDIAL BRANCH L2-L5 performed by Maxi Olguin MD at [address] AA&/STRD TFRML EPI LUMBAR/SACRAL 1 LEVEL Bilateral 9/6/2018    BILATERAL L5 CACHORRO performed by Maxi Olguin MD at [address] AA&/STRD TFRML EPI LUMBAR/SACRAL 1 LEVEL N/A 9/28/2018    BILATERAL L5 CACHORRO performed by Maxi Olguin MD at [facility] N/CARPAL [address] Right 8/29/2017    CARPAL TUNNEL RELEASE RIGHT performed by Kushal Gonzalez MD at [facility] N/CARPAL TUNNEL SURG Left 10/31/2017    CARPAL TUNNEL RELEASE performed by Kushal Gonzalez MD at [facility] 12/29/2020    EGD BIOPSY performed by Maye Bolden MD at [facility] 2/2/2021    EGD BIOPSY and spot marking performed by Candelario Kenney MD at [facility] N/A 2/12/2021    ENDOSCOPIC ULTRASOUND, EGD performed by Rosette Cortez MD at [address] [place] [address]  2/12/2021    EGD DIAGNOSTIC ONLY performed by Rosette Cortez MD at [facility]       MEDICATIONS:    Current Outpatient Medications:     calcium carbonate 600 MG TABS tablet, take 1 tablet by mouth once daily, Disp: , Rfl:     D3-50 1.25 MG (06526 UT) CAPS, take 1 capsule by mouth every FRIDAY, Disp: , Rfl:     hydrOXYzine (VISTARIL) 25 MG capsule, take 1 capsule by mouth three times a day if needed for anxiety, Disp: 90 capsule, Rfl: 2    atorvastatin (LIPITOR) 20 MG tablet, take 1 tablet by mouth at bedtime, Disp: 90 tablet, Rfl: 1    spironolactone (ALDACTONE) 50 MG tablet, take 1 tablet by mouth once daily, Disp: 90 tablet, Rfl: 3    vitamin D (ERGOCALCIFEROL) 1.25 MG (15768 UT) CAPS capsule, take 1 capsule by mouth every week, Disp: 12 capsule, Rfl: 1    nadolol (CORGARD) 20 MG tablet, Take 1 tablet by mouth daily, Disp: 30 tablet, Rfl: 3    rifaximin (XIFAXAN) 550 MG tablet, Take 1 tablet by mouth 3 times daily, Disp: 42 tablet, Rfl: 0    pantoprazole (PROTONIX) 40 MG tablet, Take 1 tablet by mouth every morning (before breakfast), Disp: 30 tablet, Rfl: 3    sildenafil (VIAGRA) 100 MG tablet, Take 1 tablet by mouth as needed for Erectile Dysfunction, Disp: 10 tablet, Rfl: 3    ALLERGIES:   No Known Allergies    SOCIAL HISTORY:  Social History     Socioeconomic History    Marital status: Single     Spouse name: None    Number of children: None    Years of education: None    Highest education level: None   Occupational History    None   Social Needs    Financial resource strain: Not hard at all   [place] insecurity     Worry: Never true     Inability: Never true    Transportation needs     Medical: No     Non-medical: No   Tobacco Use    Smoking status: Former Smoker     Packs/day: 1.00     Years: 45.00     Pack years: 45.00     Quit date: 2017     Years since quittin.1    Smokeless tobacco: Never Used   Substance and Sexual Activity    Alcohol use: No     Comment:     Drug use: No     Frequency: 1.0 times per week     Comment: cocaine,  stopped spring 2016    Sexual activity: Yes     Partners: Female   Lifestyle    Physical activity     Days per week: None     Minutes per session: None    Stress: None   Relationships    Social connections     Talks on phone: None     Gets together: None Attends Synagogue service: None     Active member of club or organization: None     Attends meetings of clubs or organizations: None     Relationship status: None    Intimate partner violence     Fear of current or ex partner: None     Emotionally abused: None     Physically abused: None     Forced sexual activity: None   Other Topics Concern    None   Social History Narrative     in the past, retired       FAMILY HISTORY:  Family History   Problem Relation Age of Onset    Cancer Mother         pancreatic    Cancer Father         bone    Diabetes Sister     Asthma Brother        REVIEW OF SYSTEMS:    GENERAL/CONSTITUTIONAL: The patient denies fever, fatigue, weakness, weight gain or weight loss. HEENT: The patient denies pain, redness, loss of vision, double or blurred vision. The patient denies ringing in the ears, loss of hearing, hoarseness, trouble swallowing, or painful swallowing. CARDIOVASCULAR: The patient denies chest pain, irregular heartbeats, sudden changes in heartbeat or palpitation. RESPIRATORY: The patient denies shortness of breath, cough, hemoptysis. GASTROINTESTINAL: The patient denies nausea, vomiting, diarrhea, constipation, blood in the stools. GENITOURINARY: The patient denies difficult urination, pain or burning with urination, blood in the urine. MUSCULOSKELETAL: The patient denies arm, buttock, thigh or calf cramps. No joint or muscle pain. SKIN: The patient denies easy bruising, skin redness, skin rash, hives. NEUROLOGIC: The patient denies headache, dizziness, fainting, muscle spasm, loss of consciousness. ENDOCRINE: The patient denies intolerance to hot or cold temperature, flushing. HEMATOLOGIC/LYMPHATIC: The patient denies anemia, bleeding tendency or clotting tendency. ALLERGIC/IMMUNOLOGIC: The patient denies rhinitis, asthma, skin sensitivity. PYSCHOLOGIC: The patient denies any depression, anxiety, or confusion.     A full 14 point review of systems was performed and assessed and found to be negative except as noted above. PHYSICAL EXAMINATION:    CHAPERONE: Not Required    ECO Asymptomatic    VITAL SIGNS: /73   Pulse 62   Temp 98 °F (36.7 °C)   Resp 14   Wt 222 lb (100.7 kg)   SpO2 98%   BMI 31.85 kg/m²   GENERAL:  General appearance is that of a well-nourished, well-developed in no apparent distress. HEENT: Normocephalic, atraumatic, EOMI. LABS:  WBC   Date Value Ref Range Status   2020 4.3 3.5 - 11.0 k/uL Final     Segs Absolute   Date Value Ref Range Status   2020 2.76 1.3 - 9.1 k/uL Final     Hemoglobin   Date Value Ref Range Status   2020 11.8 (L) 13.5 - 17.5 g/dL Final     Platelet Count   Date Value Ref Range Status   2012 308 140 - 450 k/uL Final     Platelets   Date Value Ref Range Status   2020 97 (L) 150 - 450 k/uL Final     No results found for: , CEA  PSA   Date Value Ref Range Status   2017 0.44 <4.1 ug/L Final     Comment:     The Roche \"ECLIA\" assay is used. Results obtained with different assay methods   cannot be used interchangeably. Performed at [facility] [address] [place] [address], [address] [place] [address] [phone]     2012 0.54 0 - 4 ug/L Final     Comment:     Performed at [address] [place] [address] [phone]       IMAGING:   As per HPI    PATHOLOGY:  As per HPI      ASSESSMENT AND PLAN:   Jamaica Mckeon is a 63 y.o. male with a Cancer Staging  Primary adenocarcinoma of lower third of esophagus. Stage IIb (cT2, N0, M0)     I reviewed with him and his wife the testing that has been done so far, including imaging and pathology results. We also reviewed their staging based on the testing that has been done and the recommendations per the NCCN guidelines. I explained that this appears to be an early incidentally found esophagus cancer. I discussed the NCCN guidelines with him.   I told him that for a favorable T2 lesion surgery alone would be appropriate. We discussed that based upon his medical conditions he may or may not be a candidate for surgery. I explained that for more advanced cancers of the esophagus generally chemotherapy and radiation therapy possibly followed by surgery was usually recommended. I reviewed with him that if he was not medically a candidate for surgery, then chemotherapy and radiation therapy would be indicated for early disease. I explained that the goal of treatment would be curative. I recommended he see surgery at the [address] [place] [address],[address] as recommended. I told him that if they recommended surgery I thought that would be the best option. If he is not a candidate for surgery then I would recommend radiation therapy with chemotherapy if he is a candidate. We reviewed the logistics of treatment planning, including a CT Simulation session, as well as approximately 25-30 daily treatments. I also briefly reviewed the acute and chronic side effects and risks of radiation therapy for a distal esophageal cancer. An appointment with the [facility] thoracic surgeons will be scheduled. He will be scheduled to return here if he is not a candidate for definitive surgery. He was in agreement with my recommendations. All questions were answered to their satisfaction. He was advised to contact us anytime should they have any questions or concerns. I spent greater than 50 minutes counseling and evaluating the different treatment options available to the patient and formulating a plan of action today. Santiago Lynch MD  Electronically signed by Santiago Lynch MD on 3/17/21 at 10:24 AM EDT      Drugs Prescribed:  New Prescriptions    No medications on file       Orders Placed:   No orders of the defined types were placed in this encounter.         CC:  Patient Care Team:  MASSIMO Mejia CNP as PCP - General (Certified Nurse Practitioner)  MASSIMO Mejia CNP as PCP - [facility] Provider  Fara Pinedo MD as Consulting Physician (Pain Management)  Chandrakant Celeste MD as Consulting Physician (Gastroenterology)  Rosio Traylor RN as Nurse Navigator (Oncology)

## 2021-03-18 ENCOUNTER — TELEPHONE (OUTPATIENT)
Dept: ONCOLOGY | Age: 61
End: 2021-03-18

## 2021-03-18 ENCOUNTER — HOSPITAL ENCOUNTER (OUTPATIENT)
Dept: RADIATION ONCOLOGY | Age: 61
Discharge: HOME | End: 2021-03-18
Payer: MEDICARE

## 2021-03-18 VITALS
SYSTOLIC BLOOD PRESSURE: 115 MMHG | RESPIRATION RATE: 14 BRPM | DIASTOLIC BLOOD PRESSURE: 73 MMHG | OXYGEN SATURATION: 98 % | WEIGHT: 222 LBS | HEART RATE: 62 BPM | TEMPERATURE: 98 F | BODY MASS INDEX: 31.85 KG/M2

## 2021-03-18 DIAGNOSIS — K22.70 PRIMARY ADENOCARCINOMA OF LOWER THIRD OF ESOPHAGUS DUE TO BARRETT ESOPHAGUS (HCC): Primary | ICD-10-CM

## 2021-03-18 DIAGNOSIS — C15.5 PRIMARY ADENOCARCINOMA OF LOWER THIRD OF ESOPHAGUS DUE TO BARRETT ESOPHAGUS (HCC): Primary | ICD-10-CM

## 2021-03-18 PROCEDURE — 99213 OFFICE O/P EST LOW 20 MIN: CPT | Performed by: RADIOLOGY

## 2021-03-18 PROCEDURE — 99204 OFFICE O/P NEW MOD 45 MIN: CPT | Performed by: RADIOLOGY

## 2021-03-18 NOTE — PROGRESS NOTES
Referring Physician: Dr Partida Lin/18/21 1315   BP: 115/73   Pulse: 62   Resp: 14   Temp: 98 °F (36.7 °C)   SpO2: 98%    :  Patient Currently in Pain: Denies             Wt Readings from Last 1 Encounters:   21 222 lb (100.7 kg)        Body mass index is 31.85 kg/m². Immunizations:    Influenza status:    [x]   Current   []   Patient declined    Pneumococcal status:  [x]   Current  []   Patient declined  Covid status:   []  Dose #1:                     []  Dose #2:               [x]   Patient declined    Smoking Status:    [] Smoker - PPD:   [x] Nonsmoker - Quit Date:               [] Never a smoker      No chief complaint on file. Cancer Staging  No matching staging information was found for the patient. Prior Radiation Therapy? No   If yes, site treated:   Facility:                             Date:    Concurrent Chemo/radiation? No   If yes, start date:    Prior Chemotherapy? No   If yes    Facility:                             Date:    Prior Hormonal Therapy? No   If yes   Facility:                             Date:    Head and Neck Cancer? No   If yes, please remind physician to place swallow study order and speech therapy order.       Pacemaker/Defibulator/ICD:  No             BREAST/GYN  Patient only:     LMP:    Age at first Menses:    Para:    :    Cup size:    Lymphedema Evaluation[de-identified]   [] left arm      [] right arm  Location:     Measurement (cm)    Upper Bicep :    Lower Bicep :           Current Outpatient Medications   Medication Sig Dispense Refill    calcium carbonate 600 MG TABS tablet take 1 tablet by mouth once daily      D3-50 1.25 MG (74856 UT) CAPS take 1 capsule by mouth every FRIDAY      hydrOXYzine (VISTARIL) 25 MG capsule take 1 capsule by mouth three times a day if needed for anxiety 90 capsule 2    atorvastatin (LIPITOR) 20 MG tablet take 1 tablet by mouth at bedtime 90 tablet 1    spironolactone (ALDACTONE) 50 MG tablet take 1 tablet by mouth once daily 90 tablet 3    vitamin D (ERGOCALCIFEROL) 1.25 MG (58072 UT) CAPS capsule take 1 capsule by mouth every week 12 capsule 1    nadolol (CORGARD) 20 MG tablet Take 1 tablet by mouth daily 30 tablet 3    rifaximin (XIFAXAN) 550 MG tablet Take 1 tablet by mouth 3 times daily 42 tablet 0    pantoprazole (PROTONIX) 40 MG tablet Take 1 tablet by mouth every morning (before breakfast) 30 tablet 3    sildenafil (VIAGRA) 100 MG tablet Take 1 tablet by mouth as needed for Erectile Dysfunction 10 tablet 3     No current facility-administered medications for this encounter.         Past Medical History:   Diagnosis Date    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     Alcohol abuse     6-12 beers a day; quit drinking July 2016    Arthritis     Back pain, chronic     dr. Dhruv Huber, orthopedic, every 3-4 months, gets steroid injection    Lezama esophagus     BPH (benign prostatic hypertrophy)     Cholelithiasis     [facility]osis ([facility]     COVID-19 12/2020    pt reports he tested + while at [facility] DDD (degenerative disc disease), lumbar     Esophageal varices ([facility]     Fatty liver     GERD (gastroesophageal reflux disease)     Hep C w/o coma, chronic ([facility]     History of colon polyps 2016    [place] (hard of hearing)     Hyperlipidemia     Hypertension     Stomach ulcer     hx of    Tobacco abuse     Tubular adenoma of colon 2016, 2018    Wears glasses        Past Surgical History:   Procedure Laterality Date    BUNIONECTOMY      twice on right side    BUNIONECTOMY Left     CARPAL TUNNEL RELEASE Right     COLONOSCOPY      at age 35    COLONOSCOPY  10/05/2016    polyps-pathology tubular adenoma, and abnormal looking mucosa right colon-pathology-tubular adenoma    COLONOSCOPY N/A 3/30/2018    COLONOSCOPY POLYPECTOMY COLD BIOPSY performed by Luther Bass MD at [address] [place] [address]  03/30/2018    Small polyp in the sigmoid colon and excised with biopsy forceps--tubular adenoma  ENDOSCOPY, COLON, DIAGNOSTIC      EGD    KNEE SURGERY Left     cyst removed    NASAL SEPTUM SURGERY      NERVE BLOCK Right 11/23/2020    NERVE BLOCK RIGHT CERVICAL STEROID INJECTION  C3-C6 performed by Fara Pinedo MD at [facility]  01/04/16    steroid injection C7 T1    OTHER SURGICAL HISTORY  11/21/2016    Bilateral Lumbar CACHORRO L4-L5 injections    OTHER SURGICAL HISTORY  12/19/2016    lumbar steroid injection    OTHER SURGICAL HISTORY  09/28/2018    BILATERAL L5 CACHORRO (N/A Back)    OTHER SURGICAL HISTORY Right 11/23/2020    cervical injection    PAIN MANAGEMENT PROCEDURE Left 7/9/2020    EPIDURAL STEROID INJECTION LEFT L4 L5 performed by Fara Pinedo MD at [facility] Left 7/20/2020    LEFT L4 L5 EPIDURAL STEROID INJECTION performed by Fara Pinedo MD at [address] [place] [address] Bilateral 8/17/2020    LUMBAR FACET BILATERAL L2-L5 performed by Fara Pinedo MD at [address] [place] [address] Bilateral 12/7/2020    NERVE BLOCK BILATERAL LUMBAR MEDIAL BRANCH L2-L5 performed by Fara Pinedo MD at [address] AA&/STRD TFRML EPI LUMBAR/SACRAL 1 LEVEL Bilateral 9/6/2018    BILATERAL L5 CACHORRO performed by Fara Pinedo MD at [address] AA&/STRD TFRML EPI LUMBAR/SACRAL 1 LEVEL N/A 9/28/2018    BILATERAL L5 CACHORRO performed by Fara Pinedo MD at [facility] N/CARPAL TUNNEL SURG Right 8/29/2017    CARPAL TUNNEL RELEASE RIGHT performed by Siomara Hudson MD at [facility] N/CARPAL TUNNEL SURG Left 10/31/2017    CARPAL TUNNEL RELEASE performed by Siomara Hudson MD at [facility] 12/29/2020    EGD BIOPSY performed by Valentino Eon, MD at [facility] 2/2/2021    EGD BIOPSY and spot marking performed by Chandrakant Celeste MD at [facility] 2/12/2021    ENDOSCOPIC ULTRASOUND, EGD performed by Brittnee Zapata MD at [facility]    UPPER GASTROINTESTINAL ENDOSCOPY  2021    EGD DIAGNOSTIC ONLY performed by Brittnee Zapata MD at [facility]       Family History   Problem Relation Age of Onset    Cancer Mother         pancreatic    Cancer Father         bone    Diabetes Sister     Asthma Brother        Social History     Socioeconomic History    Marital status: Single     Spouse name: Not on file    Number of children: Not on file    Years of education: Not on file    Highest education level: Not on file   Occupational History    Not on file   Social Needs    Financial resource strain: Not hard at all   [employer] insecurity     Worry: Never true     Inability: Never true   [employer] needs     Medical: No     Non-medical: No   Tobacco Use    Smoking status: Former Smoker     Packs/day: 1.00     Years: 45.00     Pack years: 45.00     Quit date: 2017     Years since quittin.1    Smokeless tobacco: Never Used   Substance and Sexual Activity    Alcohol use: No     Comment:     Drug use: No     Frequency: 1.0 times per week     Comment: cocaine,  stopped spring 2016    Sexual activity: Yes     Partners: Female   Lifestyle    Physical activity     Days per week: Not on file     Minutes per session: Not on file    Stress: Not on file   Relationships    Social connections     Talks on phone: Not on file     Gets together: Not on file     Attends Gnosticist service: Not on file     Active member of club or organization: Not on file     Attends meetings of clubs or organizations: Not on file     Relationship status: Not on file    Intimate partner violence     Fear of current or ex partner: Not on file     Emotionally abused: Not on file     Physically abused: Not on file     Forced sexual activity: Not on file   Other Topics Concern    Not on file   Social History Narrative     in the past, retired             FALLS RISK SCREEN  Instructions: Assess the patient and enter the appropriate indicators that are present for fall risk identification. Total the numbers entered and assign a fall risk score from Table 2.  Reassess patient at a minimum every 12 weeks or with status change. Assessment   Date  3/18/2021     1. Mental Ability: confusion/cognitively impaired 0     2. Elimination Issues: incontinence, frequency 0       3. Ambulatory: use of assistive devices (walker, cane, off-loading devices),        attached to equipment (IV pole, oxygen) 0     4. Sensory Limitations: dizziness, vertigo, impaired vision 0     5. Age less than 65        0     6. Age 72 or greater 0     7. Medication: diuretics, strong analgesics, hypnotics, sedatives,        antihypertensive agents 3   8. Falls:  recent history of falls within the last 3 months (not to include slipping or        tripping) 0   TOTAL 3    If score of 4 or greater was education given? No       TABLE 2   Risk Score Risk Level Plan of Care   0-3 Little or  No Risk 1. Provide assistance as indicated for ambulation activities  2. Reorient confused/cognitively impaired patient  3. Call-light/bell within patient's reach  4. Chair/bed in low position, stretcher/bed with siderails up except when performing patient care activities  5. Educate patient/family/caregiver on falls prevention  6.  Reassess in 12 weeks or with any noted change in patient condition which places them at a risk for a fall   4-6 Moderate Risk 1. Provide assistance as indicated for ambulation activities  2. Reorient confused/cognitively impaired patient  3. Call-light/bell within patient's reach  4. Chair/bed in low position, stretcher/bed with siderails up except when performing patient care activities  5. Educate patient/family/caregiver on falls prevention     7 or   Higher High Risk 1. Place patient in easily observable treatment room  2. Patient attended at all times by family member or staff  3.   Provide assistance as indicated for ambulation activities  4. Reorient confused/cognitively impaired patient  5. Call-light/bell within patient's reach  6. Chair/bed in low position, stretcher/bed with siderails up except when performing patient care activities  7. Educate patient/family/caregiver on falls prevention             Assessment/Plan: Patient was seen today for consultation. He reports no pains, no dysphagia. No blood in his sputum. Dr Parul Barragan notified and examined pt. Pt to be evaluated at [facility] for surgical input. Pt placed on pending list and will return once surgical input is received.      Jason Bryant notified that pt has not heard from [facility] [employer] 3/18/2021 1:33 PM

## 2021-03-18 NOTE — TELEPHONE ENCOUNTER
Attempted to call pt a 2nd time to introduce self as navigator. No answer, detailed VM left. Writer received a message from JERI Silva at [facility] stating that the patient was there today and hasnt heard from [facility]. Writer instructed her to inform pt I would help make that connection and that I would call today. Writer did call [facility], cardiothoracic surgery and spoke to [employer], she states they have attempted to call him a few times and he hasnt returned their calls. Per Ricardo, she states to call spouse. Spouse's number provided to Rose Pack who will call to set up consultation today. Writer will call pt spouse tomorrow to not add to confusion.

## 2021-03-19 ENCOUNTER — TELEPHONE (OUTPATIENT)
Dept: ONCOLOGY | Age: 61
End: 2021-03-19

## 2021-03-19 NOTE — TELEPHONE ENCOUNTER
Name: Uriel Hernandez  : 1960  MRN: [id number]    Oncology Navigation Follow-Up Note    Contact Type:  Telephone    Notes: Writer received a call from [facility], coordinator stating they have pt tentatively scheduled for thoracic surgery consult on 21 in [place], [place] through [facility]. She does however state that his insurance is OON and connects me to Navdeep Aquino who [employer]. Navdeep Aquino states that we have to submit a PA through insurance. Writer placed call to [address] at [facility] looking for direction. Writer will update Navdeep Aquino [phone] once writer receives update on procedure for PA. Ashley, triage nurse to look into placing PA for insurance. Will continue to follow. Update: Navdeep Aquino,  from [employer] called writer back and confirms that pt doesn't need a PA like she originally  Nilam Kaufman, triage nurse updated via [email or web address].     Electronically signed by Serjio Ibrahim RN on 3/19/2021 at 9:35 AM

## 2021-03-19 NOTE — TELEPHONE ENCOUNTER
Writer called pt spouse per their request to introduce self as navigator. No answer, detailed VM left. Will await a return call.

## 2021-03-31 ENCOUNTER — HOSPITAL ENCOUNTER (EMERGENCY)
Age: 61
Discharge: HOME | End: 2021-03-31
Attending: EMERGENCY MEDICINE
Payer: MEDICARE

## 2021-03-31 ENCOUNTER — TELEPHONE (OUTPATIENT)
Dept: FAMILY MEDICINE CLINIC | Age: 61
End: 2021-03-31

## 2021-03-31 VITALS
HEART RATE: 102 BPM | SYSTOLIC BLOOD PRESSURE: 179 MMHG | TEMPERATURE: 98.3 F | DIASTOLIC BLOOD PRESSURE: 96 MMHG | WEIGHT: 208 LBS | BODY MASS INDEX: 29.78 KG/M2 | OXYGEN SATURATION: 96 % | RESPIRATION RATE: 19 BRPM | HEIGHT: 70 IN

## 2021-03-31 DIAGNOSIS — E87.6 HYPOKALEMIA: ICD-10-CM

## 2021-03-31 DIAGNOSIS — F10.930 ALCOHOL WITHDRAWAL SYNDROME WITHOUT COMPLICATION (HCC): Primary | ICD-10-CM

## 2021-03-31 LAB
ABSOLUTE BANDS #: 0.05 K/UL (ref 0–1)
ABSOLUTE EOS #: 0 K/UL (ref 0–0.4)
ABSOLUTE IMMATURE GRANULOCYTE: ABNORMAL K/UL (ref 0–0.3)
ABSOLUTE LYMPH #: 0.95 K/UL (ref 1–4.8)
ABSOLUTE MONO #: 0.7 K/UL (ref 0.1–1.3)
ACETAMINOPHEN LEVEL: <5 UG/ML (ref 10–30)
ALBUMIN SERPL-MCNC: 3.7 G/DL (ref 3.5–5.2)
ALBUMIN/GLOBULIN RATIO: ABNORMAL (ref 1–2.5)
ALP BLD-CCNC: 84 U/L (ref 40–129)
ALT SERPL-CCNC: 14 U/L (ref 5–41)
AMMONIA: 39 UMOL/L (ref 16–60)
ANION GAP SERPL CALCULATED.3IONS-SCNC: 15 MMOL/L (ref 9–17)
AST SERPL-CCNC: 52 U/L
BANDS: 1 % (ref 0–10)
BASOPHILS # BLD: 0 % (ref 0–2)
BASOPHILS ABSOLUTE: 0 K/UL (ref 0–0.2)
BILIRUB SERPL-MCNC: 3.58 MG/DL (ref 0.3–1.2)
BUN BLDV-MCNC: 9 MG/DL (ref 8–23)
BUN/CREAT BLD: ABNORMAL (ref 9–20)
CALCIUM SERPL-MCNC: 9.3 MG/DL (ref 8.6–10.4)
CHLORIDE BLD-SCNC: 98 MMOL/L (ref 98–107)
CO2: 22 MMOL/L (ref 20–31)
CREAT SERPL-MCNC: 0.57 MG/DL (ref 0.7–1.2)
DIFFERENTIAL TYPE: ABNORMAL
EOSINOPHILS RELATIVE PERCENT: 0 % (ref 0–4)
ETHANOL PERCENT: <0.01 %
ETHANOL: <10 MG/DL
GFR AFRICAN AMERICAN: >60 ML/MIN
GFR NON-AFRICAN AMERICAN: >60 ML/MIN
GFR SERPL CREATININE-BSD FRML MDRD: ABNORMAL ML/MIN/{1.73_M2}
GFR SERPL CREATININE-BSD FRML MDRD: ABNORMAL ML/MIN/{1.73_M2}
GLUCOSE BLD-MCNC: 93 MG/DL (ref 70–99)
HCT VFR BLD CALC: 38.5 % (ref 41–53)
HEMOGLOBIN: 12.5 G/DL (ref 13.5–17.5)
IMMATURE GRANULOCYTES: ABNORMAL %
LIPASE: 14 U/L (ref 13–60)
LYMPHOCYTES # BLD: 19 % (ref 24–44)
MCH RBC QN AUTO: 28.4 PG (ref 26–34)
MCHC RBC AUTO-ENTMCNC: 32.6 G/DL (ref 31–37)
MCV RBC AUTO: 87.2 FL (ref 80–100)
MONOCYTES # BLD: 14 % (ref 1–7)
MORPHOLOGY: NORMAL
NRBC AUTOMATED: ABNORMAL PER 100 WBC
PDW BLD-RTO: 14.9 % (ref 11.5–14.9)
PLATELET # BLD: 62 K/UL (ref 150–450)
PLATELET ESTIMATE: ABNORMAL
PMV BLD AUTO: 8.3 FL (ref 6–12)
POTASSIUM SERPL-SCNC: 3 MMOL/L (ref 3.7–5.3)
RBC # BLD: 4.41 M/UL (ref 4.5–5.9)
RBC # BLD: ABNORMAL 10*6/UL
SALICYLATE LEVEL: <1 MG/DL (ref 3–10)
SEG NEUTROPHILS: 66 % (ref 36–66)
SEGMENTED NEUTROPHILS ABSOLUTE COUNT: 3.3 K/UL (ref 1.3–9.1)
SODIUM BLD-SCNC: 135 MMOL/L (ref 135–144)
TOTAL PROTEIN: 7.6 G/DL (ref 6.4–8.3)
TOXIC TRICYCLIC SC,BLOOD: ABNORMAL
WBC # BLD: 5 K/UL (ref 3.5–11)
WBC # BLD: ABNORMAL 10*3/UL

## 2021-03-31 PROCEDURE — G0480 DRUG TEST DEF 1-7 CLASSES: HCPCS

## 2021-03-31 PROCEDURE — 82140 ASSAY OF AMMONIA: CPT

## 2021-03-31 PROCEDURE — 6370000000 HC RX 637 (ALT 250 FOR IP): Performed by: EMERGENCY MEDICINE

## 2021-03-31 PROCEDURE — 80179 DRUG ASSAY SALICYLATE: CPT

## 2021-03-31 PROCEDURE — 80307 DRUG TEST PRSMV CHEM ANLYZR: CPT

## 2021-03-31 PROCEDURE — 6360000002 HC RX W HCPCS: Performed by: EMERGENCY MEDICINE

## 2021-03-31 PROCEDURE — 80143 DRUG ASSAY ACETAMINOPHEN: CPT

## 2021-03-31 PROCEDURE — 99283 EMERGENCY DEPT VISIT LOW MDM: CPT

## 2021-03-31 PROCEDURE — 96375 TX/PRO/DX INJ NEW DRUG ADDON: CPT

## 2021-03-31 PROCEDURE — 36415 COLL VENOUS BLD VENIPUNCTURE: CPT

## 2021-03-31 PROCEDURE — 96365 THER/PROPH/DIAG IV INF INIT: CPT

## 2021-03-31 PROCEDURE — 2500000003 HC RX 250 WO HCPCS: Performed by: EMERGENCY MEDICINE

## 2021-03-31 PROCEDURE — 83690 ASSAY OF LIPASE: CPT

## 2021-03-31 PROCEDURE — 80053 COMPREHEN METABOLIC PANEL: CPT

## 2021-03-31 PROCEDURE — 96376 TX/PRO/DX INJ SAME DRUG ADON: CPT

## 2021-03-31 PROCEDURE — 2580000003 HC RX 258: Performed by: EMERGENCY MEDICINE

## 2021-03-31 PROCEDURE — 85025 COMPLETE CBC W/AUTO DIFF WBC: CPT

## 2021-03-31 RX ORDER — POTASSIUM CHLORIDE 20 MEQ/1
40 TABLET, EXTENDED RELEASE ORAL ONCE
Status: COMPLETED | OUTPATIENT
Start: 2021-03-31 | End: 2021-03-31

## 2021-03-31 RX ORDER — LORAZEPAM 1 MG/1
1 TABLET ORAL EVERY 8 HOURS PRN
Qty: 6 TABLET | Refills: 0 | Status: SHIPPED | OUTPATIENT
Start: 2021-03-31 | End: 2021-04-02

## 2021-03-31 RX ORDER — 0.9 % SODIUM CHLORIDE 0.9 %
1000 INTRAVENOUS SOLUTION INTRAVENOUS ONCE
Status: COMPLETED | OUTPATIENT
Start: 2021-03-31 | End: 2021-03-31

## 2021-03-31 RX ORDER — POTASSIUM CHLORIDE 20 MEQ/1
20 TABLET, EXTENDED RELEASE ORAL DAILY
Qty: 7 TABLET | Refills: 0 | Status: SHIPPED | OUTPATIENT
Start: 2021-03-31 | End: 2021-07-19 | Stop reason: ALTCHOICE

## 2021-03-31 RX ORDER — LORAZEPAM 2 MG/ML
1 INJECTION INTRAMUSCULAR ONCE
Status: COMPLETED | OUTPATIENT
Start: 2021-03-31 | End: 2021-03-31

## 2021-03-31 RX ADMIN — LORAZEPAM 1 MG: 2 INJECTION INTRAMUSCULAR; INTRAVENOUS at 14:38

## 2021-03-31 RX ADMIN — LORAZEPAM 1 MG: 2 INJECTION INTRAMUSCULAR; INTRAVENOUS at 13:49

## 2021-03-31 RX ADMIN — SODIUM CHLORIDE 1000 ML: 9 INJECTION, SOLUTION INTRAVENOUS at 13:49

## 2021-03-31 RX ADMIN — FOLIC ACID: 5 INJECTION, SOLUTION INTRAMUSCULAR; INTRAVENOUS; SUBCUTANEOUS at 14:30

## 2021-03-31 RX ADMIN — POTASSIUM CHLORIDE 40 MEQ: 1500 TABLET, EXTENDED RELEASE ORAL at 14:29

## 2021-03-31 ASSESSMENT — ENCOUNTER SYMPTOMS
DIARRHEA: 0
COUGH: 0
CONSTIPATION: 0
COLOR CHANGE: 0
VOMITING: 0
ABDOMINAL PAIN: 0
BACK PAIN: 0
SORE THROAT: 0
NAUSEA: 0
SHORTNESS OF BREATH: 0
BLOOD IN STOOL: 0
TROUBLE SWALLOWING: 0

## 2021-03-31 NOTE — TELEPHONE ENCOUNTER
Patient states he needs a script for Paws and Ambien as he is having withdrawals, unable to sleep x4 days, and hallucinations. Patient denies any suicidal or homicidal thoughts. Patient states his walls and ceilings were discolored with letters, cartoons, and numbers. Patient states he saw a large figure with a top hat and long coat at his window. Patient states it freaked him out. Patient states he tried sleeping this morning and when he had his eyes closed he could read a book. I advised if he was having any suicidal or homicidal ideations to go to the ED. Patient states the sooner the better for getting the scripts so he can sleep. Please advise.

## 2021-03-31 NOTE — ED PROVIDER NOTES
[address]  EMERGENCY DEPARTMENT ENCOUNTER    Pt Name: Jamari King  MRN: [id number]  YOB: 1960  Date of evaluation:3/31/21  PCP: MASSIMO Green CNP    CHIEF COMPLAINT       Chief Complaint   Patient presents with    Headache    Shaking    Hallucinations    Psychiatric Evaluation    Insomnia       HISTORY OF PRESENT ILLNESS    Jamari King is a 63 y.o. male who presents with a chief complaint of withdrawal symptoms. Patient states that he stopped drinking alcohol on Friday. He has been drinking daily. He states that starting Sunday he started getting some visual and auditory hallucinations, general shaking and difficulty sleeping. He states that he detox from alcohol in December and then started drinking again at some point between now and then. No recent fevers, chills or illnesses. No falls or head trauma. No chest pain or difficulty breathing. No other drug use. He states he actually feels better today than he did yesterday. His ex-wife was concerned because she thought he could be going through a secondary withdrawal from his initial detox. She was unclear that he had started drinking again. Symptoms are acute. Symptoms are moderate. Nothing make symptoms better or worse. Patient has no other complaints at this time. REVIEW OF SYSTEMS       Review of Systems   Constitutional: Negative for chills, fatigue and fever. HENT: Negative for congestion, ear pain, sore throat and trouble swallowing. Eyes: Negative for visual disturbance. Respiratory: Negative for cough and shortness of breath. Cardiovascular: Negative for chest pain, palpitations and leg swelling. Gastrointestinal: Negative for abdominal pain, blood in stool, constipation, diarrhea, nausea and vomiting. Genitourinary: Negative for dysuria and flank pain. Musculoskeletal: Negative for arthralgias, back pain, myalgias and neck pain. Skin: Negative for color change, rash and wound. Neurological: Positive for tremors. Negative for dizziness, weakness, light-headedness, numbness and headaches. Psychiatric/Behavioral: Positive for hallucinations and sleep disturbance. Negative for confusion and suicidal ideas. The patient is nervous/anxious and is hyperactive. All other systems reviewed and are negative. Negative in 10 essential Systems except as mentioned above and in the HPI. PAST MEDICAL HISTORY     Past Medical History:   Diagnosis Date    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     Alcohol abuse     6-12 beers a day; quit drinking July 2016    Arthritis     Back pain, chronic     dr. Dianna Larson, orthopedic, every 3-4 months, gets steroid injection    Lezama esophagus     BPH (benign prostatic hypertrophy)     Cholelithiasis     [facility]     COVID-19 12/2020    pt reports he tested + while at [facility] DDD (degenerative disc disease), lumbar     Esophageal varices ([facility]     Fatty liver     GERD (gastroesophageal reflux disease)     Hep C w/o coma, chronic ([facility]     History of colon polyps 2016    [place] (hard of hearing)     Hyperlipidemia     Hypertension     Stomach ulcer     hx of    Tobacco abuse     Tubular adenoma of colon 2016, 2018    Wears glasses          SURGICAL HISTORY      has a past surgical history that includes Bunionectomy; Nasal septum surgery; other surgical history (01/04/16); Colonoscopy; Colonoscopy (10/05/2016); other surgical history (11/21/2016); other surgical history (12/19/2016); knee surgery (Left); Bunionectomy (Left); Endoscopy, colon, diagnostic; pr revise median n/carpal tunnel surg (Right, 8/29/2017); Carpal tunnel release (Right); pr revise median n/carpal tunnel surg (Left, 10/31/2017); Colonoscopy (N/A, 3/30/2018); Colonoscopy (03/30/2018); pr njx aa&/strd tfrml epi lumbar/sacral 1 level (Bilateral, 9/6/2018); other surgical history (09/28/2018); pr njx aa&/strd tfrml epi lumbar/sacral 1 level (N/A, 9/28/2018);  Pain INITIAL VITALS:  height is 5' 10\" (1.778 m) and weight is 208 lb (94.3 kg). His oral temperature is 98.3 °F (36.8 °C). His blood pressure is 179/96 (abnormal) and his pulse is 102. His respiration is 19 and oxygen saturation is 96%. Physical Exam  Vitals signs and nursing note reviewed. Constitutional:       General: He is not in acute distress. HENT:      Head: Normocephalic and atraumatic. Eyes:      Conjunctiva/sclera: Conjunctivae normal.      Pupils: Pupils are equal, round, and reactive to light. Neck:      Musculoskeletal: Neck supple. Cardiovascular:      Rate and Rhythm: Normal rate and regular rhythm. Heart sounds: Normal heart sounds. No murmur. Pulmonary:      Effort: Pulmonary effort is normal. No respiratory distress. Breath sounds: Normal breath sounds. Abdominal:      General: Bowel sounds are normal. There is no distension. Palpations: Abdomen is soft. Tenderness: There is no abdominal tenderness. Musculoskeletal:         General: No tenderness. Lymphadenopathy:      Cervical: No cervical adenopathy. Skin:     General: Skin is warm and dry. Findings: No rash. Neurological:      Mental Status: He is alert and oriented to person, place, and time. GCS: GCS eye subscore is 4. GCS verbal subscore is 5. GCS motor subscore is 6. Motor: Tremor present. Psychiatric:         Judgment: Judgment normal.           DIFFERENTIAL DIAGNOSIS/MDM:   69-year-old male presents with withdrawal symptoms. He is afebrile, nontoxic, hypertensive but otherwise vital signs normal.  No acute distress. He is alert and orient x4. GCS 15. He does have some tremoring noted of his upper extremities. I think his symptoms are probably secondary to alcohol withdrawal.  It seems like he has passed the worst of it with his symptoms improving since yesterday. We will check lab work, give IV fluids, Ativan, thiamine, folate.     DIAGNOSTIC RESULTS     EKG: All EKG's APRN - CNP  [facility], [address] [facility] [address]  [address] [facility] [address]  [phone]    Schedule an appointment as soon as possible for a visit       [facility] ED  [place] [id number] [phone]    As needed, If symptoms worsen      DISCHARGE MEDICATIONS:  New Prescriptions    LORAZEPAM (ATIVAN) 1 MG TABLET    Take 1 tablet by mouth every 8 hours as needed for Anxiety for up to 2 days.     POTASSIUM CHLORIDE (KLOR-CON M) 20 MEQ EXTENDED RELEASE TABLET    Take 1 tablet by mouth daily for 7 days       (Please note that portions ofthis note were completed with a voice recognition program.  Efforts were made to edit the dictations but occasionally words are mis-transcribed.)    Jed Issa DO  Attending Emergency Physician          Jed Issa DO  03/31/21 4620

## 2021-03-31 NOTE — ED NOTES
Pt given instructions for follow-up and discharge. Pt verbalizes understanding. Pt is A&O x4, PWD, eupneic, and ambulatory with steady, even gait upon discharge.       Anil Carlin RN  03/31/21 6220

## 2021-04-01 ENCOUNTER — TELEPHONE (OUTPATIENT)
Dept: FAMILY MEDICINE CLINIC | Age: 61
End: 2021-04-01

## 2021-04-01 NOTE — TELEPHONE ENCOUNTER
[facility] ([facility]) ED Follow up Call            FU appts/Provider:    Future Appointments   Date Time Provider Carolyn English   4/6/2021  4:00 PM Tank Grove MD [address] [place] [address]   5/25/2021  2:30 PM MASSIMO Coleman - CNP [facility] [address] IF NOT USED  Hi, this message is for  Priti Santos  This is Garima from [address] [place] [address] office. Just calling to see how you are doing after your recent visit to the Emergency Room. [address] [place] [address] wants to make sure you were able to fill any prescriptions and that you understand your discharge instructions. Please return our call if you need to make a follow up appointment with your provider or have any further needs. Our phone number is [phone]. Have a great day.

## 2021-04-03 RX ORDER — LACTULOSE 10 G/15ML
15 SOLUTION ORAL 4 TIMES DAILY PRN
COMMUNITY
Start: 2021-01-22 | End: 2021-08-25 | Stop reason: ALTCHOICE

## 2021-04-04 NOTE — PROGRESS NOTES
[facility] PHYSICIANS  [facility] FAMILY PHYSICIANS [address] [facility] [address].  [address] [id number]  Dept: Edvin Mendoza (:  1960) is a 63 y.o. male,Established patient, here for evaluation of the following chief complaint(s):  Chief Complaint   Patient presents with    ED Follow-up     ALCOHOL WITHDRAWAL    Other     asking for refill on ativan - not on his list of medications         SUBJECTIVE/OBJECTIVE:  HPI   Serenity Su is a 63 y.o. male patient. He is an established patient of HAYDEN Lopez. Here today for a follow-up on his most recent admission to the hospital.    Patient declared himself an alcoholic. Patient has had struggles with alcohol use. Patient has tried to stop drinking several times but managed to relapse. Patient have not gone to any outpatient rehab facility but helps been able to quit on his own. He was seen in the emergency room recently. Patient had an elevated ammonia level and alcohol level. Also some hypokalemia. Patient states that he has not drank for 2 weeks now. He wants to continue this path. Patient states that he usually drinks beer because he is not able to sleep at night. Patient has been trialed on melatonin in the past.  He is also tried some Ambien which caused him to have some hallucinations therefore he stopped. Patient was requesting to have a refill on the Xanax. A long discussion about the inappropriateness of that medication. Patient is not showing any withdrawal symptoms at all. Patient is willing to try trazodone to help him sleep    HEPATITIS C  Patient was noted to have some hepatitis C in the past.  Patient have had his treatment done. Patient is established with [facility] . DEPRESSION AND ANXIETY/ESOPHAGEAL CANCER  Serenity Su reported some ongoing issues with depression and anxiety.    Since patient was told that he does have an esophageal cancer and he needed to be sent to [facility] for possible partial esophagectomy. Patient has been having some worsening symptoms of depression and anxiety. Patient is also established with Dr. Martha Cifuentes. In fact, he has an appointment to see him tomorrow. He will be seeing  from  . Patient is  with grown kids. He lives on his own. Patient finds himself not able to sleep even with some of the sleep aids that he is tried in the past.  Patient was also started on hydroxyzine which he does not think actually helps him. Symptoms includes insomnia, irritable, psychomotor agitation, racing thoughts and anhedonia. Current therapy includes hydroxyzine,, which is working well for him. he denies adverse reaction to current therapy. he also denies suicidal/homicidal ideation, plan or intent. Patient being seen by Jessica Stafford. PHQ-2 Over the past 2 weeks, how often have you been bothered by any of the following problems? Little interest or pleasure in doing things: Not at all  Feeling down, depressed, or hopeless: Not at all  PHQ-2 Score: 0  PHQ-9 Over the past 2 weeks, how often have you been bothered by any of the following problems? PHQ-9 Total Score: 0   No flowsheet data found. NON SEASONAL ALLERGIC RHINITIS  Presented with erythematous pharynx and nasal mucosa. Patient states that he does have some postnasal drip and some rhinorrhea which is worse at night. This is not seasonal but year-round. Patient is never taking any medication for allergies in the past.  Although he is on hydroxyzine he does not think that it is helping at all. We are going to start him on some Claritin. CBC and CMP reviewed with Frank : Low hemoglobin and hematocrit, thrombocytopenia lymphopenia, hypokalemia and elevated liver function tests. Patient was given some potassium supplementation which she will be continuing. I will go ahead and reorder a recheck to ensure proper placement.   Patient denies any chest pain, shortness of breath, or palpitations. Patient denies abdominal pain, diarrhea or constipation. Patient denies any fever or chills. Patient's ammonia level was elevated and was given some lactulose. Lab Results   Component Value Date    WBC 5.0 03/31/2021    HGB 12.5 (L) 03/31/2021    HCT 38.5 (L) 03/31/2021    MCV 87.2 03/31/2021    PLT 62 (L) 03/31/2021    LYMPHOPCT 19 (L) 03/31/2021    RBC 4.41 (L) 03/31/2021    MCH 28.4 03/31/2021    MCHC 32.6 03/31/2021    RDW 14.9 03/31/2021     Lab Results   Component Value Date     03/31/2021    K 3.0 (L) 03/31/2021    CL 98 03/31/2021    CO2 22 03/31/2021    BUN 9 03/31/2021    CREATININE 0.57 (L) 03/31/2021    GLUCOSE 93 03/31/2021    CALCIUM 9.3 03/31/2021    PROT 7.6 03/31/2021    LABALBU 3.7 03/31/2021    BILITOT 3.58 (H) 03/31/2021    ALKPHOS 84 03/31/2021    AST 52 (H) 03/31/2021    ALT 14 03/31/2021    LABGLOM >60 03/31/2021    GFRAA >60 03/31/2021    GLOB NOT REPORTED 12/30/2020       Vitals:    04/05/21 1125   BP: 120/80   Pulse: 78   Temp: 97.7 °F (36.5 °C)   SpO2: 98%   Weight: 223 lb (101.2 kg)   Height: 5' 10\" (1.778 m)   Estimated body mass index is 32 kg/m² as calculated from the following:    Height as of this encounter: 5' 10\" (1.778 m). Weight as of this encounter: 223 lb (101.2 kg). Review of Systems   Constitutional: Negative for chills, fatigue and fever. HENT: Positive for postnasal drip and rhinorrhea. Respiratory: Negative. Negative for shortness of breath. Cardiovascular: Negative. Negative for chest pain and palpitations. Gastrointestinal: Negative for abdominal pain. Endocrine: Negative. Genitourinary: Negative for dysuria. Musculoskeletal: Negative for arthralgias and myalgias. Skin: Negative for rash. Neurological: Negative for headaches. Psychiatric/Behavioral: Positive for dysphoric mood. Negative for sleep disturbance and suicidal ideas. The patient is nervous/anxious.         Physical Exam  Vitals signs and nursing note reviewed. Constitutional:       Appearance: He is well-developed. HENT:      Head: Normocephalic. Right Ear: External ear normal.      Left Ear: External ear normal.      Nose: Congestion present. Mouth/Throat:      Mouth: Mucous membranes are moist.      Pharynx: Posterior oropharyngeal erythema present. Eyes:      Pupils: Pupils are equal, round, and reactive to light. Neck:      Musculoskeletal: Normal range of motion and neck supple. Cardiovascular:      Rate and Rhythm: Normal rate and regular rhythm. Pulses: Normal pulses. Heart sounds: Normal heart sounds. Pulmonary:      Effort: Pulmonary effort is normal. No respiratory distress. Breath sounds: Normal breath sounds. Abdominal:      General: Bowel sounds are normal. There is no distension. Palpations: Abdomen is soft. Tenderness: There is no abdominal tenderness. Musculoskeletal: Normal range of motion. Skin:     General: Skin is warm and dry. Capillary Refill: Capillary refill takes less than 2 seconds. Neurological:      Mental Status: He is alert and oriented to person, place, and time. Psychiatric:         Mood and Affect: Mood is anxious.          Behavior: Behavior normal.             Diagnosis Date    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 [address] [facility]     Alcohol abuse     6-12 beers a day; quit drinking July 2016    Arthritis     Back pain, chronic     dr. Mike Javier, orthopedic, every 3-4 months, gets steroid injection    Lezama esophagus     BPH (benign prostatic hypertrophy)     Cholelithiasis     Cirrhosis [facility] [address] [facility]     COVID-19 12/2020    pt reports he tested + while at [facility] DDD (degenerative disc disease), lumbar     Esophageal varices ([facility] [address] [facility]     Fatty liver     GERD (gastroesophageal reflux disease)     Hep C w/o coma, chronic ([facility] [address] [facility]     History of colon polyps 2016    [place] (hard of hearing)     Hyperlipidemia     Hypertension     Stomach ulcer     hx of    Tobacco abuse     Tubular adenoma of colon 2016, 2018    Wears glasses       Prior to Visit Medications    Medication Sig Taking? Authorizing Provider   lactulose (CHRONULAC) 10 GM/15ML solution Take 15 mLs by mouth 4 times daily as needed Yes Historical Provider, MD   potassium chloride (KLOR-CON M) 20 MEQ extended release tablet Take 1 tablet by mouth daily for 7 days Yes Sophia Machado,    calcium carbonate 600 MG TABS tablet take 1 tablet by mouth once daily Yes Historical Provider, MD   D3-50 1.25 MG (13632 UT) CAPS take 1 capsule by mouth every FRIDAY Yes Historical Provider, MD   hydrOXYzine (VISTARIL) 25 MG capsule take 1 capsule by mouth three times a day if needed for anxiety Yes MASSIMO Coleman CNP   atorvastatin (LIPITOR) 20 MG tablet take 1 tablet by mouth at bedtime Yes MASSIMO Coleman CNP   spironolactone (ALDACTONE) 50 MG tablet take 1 tablet by mouth once daily Yes MASSIMO Coleman CNP   vitamin D (ERGOCALCIFEROL) 1.25 MG (69723 UT) CAPS capsule take 1 capsule by mouth every week Yes MASSIMO Coleman CNP   nadolol (CORGARD) 20 MG tablet Take 1 tablet by mouth daily Yes Marcianne Kocher, MD   rifaximin (XIFAXAN) 550 MG tablet Take 1 tablet by mouth 3 times daily Yes Marcianne Kocher, MD   pantoprazole (PROTONIX) 40 MG tablet Take 1 tablet by mouth every morning (before breakfast) Yes Marcianne Kocher, MD   sildenafil (VIAGRA) 100 MG tablet Take 1 tablet by mouth as needed for Erectile Dysfunction Yes MASSIMO Fairbanks CNP       ASSESSMENT/PLAN:  1. Malignant neoplasm of lower third of esophagus [facility])  Ongoing  Established with [address] with Dr. Kirit Salgado  Encourage to keep appointments    2. Alcohol withdrawal syndrome without complication (HCC)  Stable  No withdrawal symptoms  Encouraged to increase fluid intake  Encouraged to never drink alcohol again    3. Alcohol abuse, in remission  ADVISED TO:  Decrease/stop alcohol use. Discussed healthy drinking guidelines. Discussed risk factors for alcohol misuse. Encouraged patient to set goal to moderate alcohol use no more than 1-2 alcoholic beverages/day. - Comprehensive Metabolic Panel; Future    4. Recurrent major depressive disorder in partial remission ([facility] [address]  Failure to Improve  Start trazodone to help with sleep  Continue hydroxyzine  Continue follow-up with a counselor  DISCUSSED and ADVISED TO:  Not stopping medication suddenly. See the specialist as discussed. Report for feelings of SI, HI, and hallucinations. Go to the ER for increasing urge to hurt yourself. 5. Psychophysiologic insomnia  Failure to Improve  Start trazodone  DISCUSSED AND ADVISED TO:  Cut down intake of drinks with caffeine, such as coffee or black tea, for 8 hours before bed. Cut down on smoking or use other types of tobacco near bedtime. Cut down on Nicotine and alcohol   Stop eating a big meal close to bedtime. Stop drinking a lot of  fluids close to bedtime. - traZODone (DESYREL) 50 MG tablet; Take 1 tablet by mouth nightly  Dispense: 90 tablet; Refill: 1    6. Hep C w/o coma, chronic (HCC)  Historical    7. Hypokalemia  Recheck  On potassium supplementation  - Comprehensive Metabolic Panel; Future    8. Thrombocytopenia (HCC)  Worsening  Established with Dr. Myrna Roman and   Appointment tomorrow    9. Lymphopenia  Worsening  Established with Dr. Myrna Roman and   Appointment tomorrow      10. Smokes with greater than 30 pack year history  Quit smoking  Evaluate for lung cancer in as recommended  - CT LUNG SCREENING; Future    11. Non-seasonal allergic rhinitis due to other allergic trigger  Failure to Improve  Continue with the same therapy   ADVISED TO:  Avoid known allergens/irritants. Stop smoking or avoid second hand smoke. Stay hydrated. Report for worsening symptoms    - loratadine (CLARITIN) 10 MG tablet; Take 1 tablet by mouth daily  Dispense: 30 tablet;  Refill: 0       No follow-ups on file.    On this date 4/5/2021 I have spent 45 minutes reviewing previous notes, test results and face to face with the patient discussing the diagnosis and importance of compliance with the treatment plan as well as documenting on the day of the visit. This note was completed by using the assistance of a speech-recognition program. However, inadvertent computerized transcription errors may be present. Although every effort was made to ensure accuracy, no guarantees can be provided that every mistake has been identified and corrected by editing.   Electronically signed by MASSIMO Cary CNP on 4/3/21 at 9:43 PM EDT     --MASSIMO Cary CNP

## 2021-04-05 ENCOUNTER — TELEPHONE (OUTPATIENT)
Dept: ONCOLOGY | Age: 61
End: 2021-04-05

## 2021-04-05 ENCOUNTER — HOSPITAL ENCOUNTER (OUTPATIENT)
Age: 61
Discharge: HOME | End: 2021-04-05
Payer: MEDICARE

## 2021-04-05 ENCOUNTER — OFFICE VISIT (OUTPATIENT)
Dept: FAMILY MEDICINE CLINIC | Age: 61
End: 2021-04-05
Payer: MEDICARE

## 2021-04-05 VITALS
HEIGHT: 70 IN | DIASTOLIC BLOOD PRESSURE: 80 MMHG | SYSTOLIC BLOOD PRESSURE: 120 MMHG | WEIGHT: 223 LBS | BODY MASS INDEX: 31.92 KG/M2 | TEMPERATURE: 97.7 F | HEART RATE: 78 BPM | OXYGEN SATURATION: 98 %

## 2021-04-05 DIAGNOSIS — J30.89 NON-SEASONAL ALLERGIC RHINITIS DUE TO OTHER ALLERGIC TRIGGER: ICD-10-CM

## 2021-04-05 DIAGNOSIS — B18.2 HEP C W/O COMA, CHRONIC (HCC): ICD-10-CM

## 2021-04-05 DIAGNOSIS — F10.11 ALCOHOL ABUSE, IN REMISSION: ICD-10-CM

## 2021-04-05 DIAGNOSIS — F17.210 SMOKES WITH GREATER THAN 30 PACK YEAR HISTORY: ICD-10-CM

## 2021-04-05 DIAGNOSIS — C15.5 MALIGNANT NEOPLASM OF LOWER THIRD OF ESOPHAGUS (HCC): Primary | ICD-10-CM

## 2021-04-05 DIAGNOSIS — F51.04 PSYCHOPHYSIOLOGIC INSOMNIA: ICD-10-CM

## 2021-04-05 DIAGNOSIS — D72.810 LYMPHOPENIA: ICD-10-CM

## 2021-04-05 DIAGNOSIS — E87.6 HYPOKALEMIA: ICD-10-CM

## 2021-04-05 DIAGNOSIS — D69.6 THROMBOCYTOPENIA (HCC): ICD-10-CM

## 2021-04-05 DIAGNOSIS — F33.41 RECURRENT MAJOR DEPRESSIVE DISORDER IN PARTIAL REMISSION (HCC): ICD-10-CM

## 2021-04-05 DIAGNOSIS — F10.930 ALCOHOL WITHDRAWAL SYNDROME WITHOUT COMPLICATION (HCC): ICD-10-CM

## 2021-04-05 PROBLEM — F17.200 CURRENT SMOKER: Status: ACTIVE | Noted: 2021-04-05

## 2021-04-05 LAB
ALBUMIN SERPL-MCNC: 3.5 G/DL (ref 3.5–5.2)
ALBUMIN/GLOBULIN RATIO: ABNORMAL (ref 1–2.5)
ALP BLD-CCNC: 65 U/L (ref 40–129)
ALT SERPL-CCNC: 23 U/L (ref 5–41)
ANION GAP SERPL CALCULATED.3IONS-SCNC: 10 MMOL/L (ref 9–17)
AST SERPL-CCNC: 86 U/L
BILIRUB SERPL-MCNC: 1.09 MG/DL (ref 0.3–1.2)
BUN BLDV-MCNC: 3 MG/DL (ref 8–23)
BUN/CREAT BLD: ABNORMAL (ref 9–20)
CALCIUM SERPL-MCNC: 9.5 MG/DL (ref 8.6–10.4)
CHLORIDE BLD-SCNC: 102 MMOL/L (ref 98–107)
CO2: 25 MMOL/L (ref 20–31)
CREAT SERPL-MCNC: 0.55 MG/DL (ref 0.7–1.2)
GFR AFRICAN AMERICAN: >60 ML/MIN
GFR NON-AFRICAN AMERICAN: >60 ML/MIN
GFR SERPL CREATININE-BSD FRML MDRD: ABNORMAL ML/MIN/{1.73_M2}
GFR SERPL CREATININE-BSD FRML MDRD: ABNORMAL ML/MIN/{1.73_M2}
GLUCOSE BLD-MCNC: 95 MG/DL (ref 70–99)
POTASSIUM SERPL-SCNC: 3.8 MMOL/L (ref 3.7–5.3)
SODIUM BLD-SCNC: 137 MMOL/L (ref 135–144)
TOTAL PROTEIN: 6.8 G/DL (ref 6.4–8.3)

## 2021-04-05 PROCEDURE — 36415 COLL VENOUS BLD VENIPUNCTURE: CPT

## 2021-04-05 PROCEDURE — 1036F TOBACCO NON-USER: CPT | Performed by: FAMILY MEDICINE

## 2021-04-05 PROCEDURE — 99214 OFFICE O/P EST MOD 30 MIN: CPT | Performed by: FAMILY MEDICINE

## 2021-04-05 PROCEDURE — G8427 DOCREV CUR MEDS BY ELIG CLIN: HCPCS | Performed by: FAMILY MEDICINE

## 2021-04-05 PROCEDURE — 80053 COMPREHEN METABOLIC PANEL: CPT

## 2021-04-05 PROCEDURE — 3017F COLORECTAL CA SCREEN DOC REV: CPT | Performed by: FAMILY MEDICINE

## 2021-04-05 PROCEDURE — G8417 CALC BMI ABV UP PARAM F/U: HCPCS | Performed by: FAMILY MEDICINE

## 2021-04-05 RX ORDER — TRAZODONE HYDROCHLORIDE 50 MG/1
50 TABLET ORAL NIGHTLY
Qty: 90 TABLET | Refills: 1 | Status: SHIPPED | OUTPATIENT
Start: 2021-04-05 | End: 2021-07-19 | Stop reason: SDUPTHER

## 2021-04-05 RX ORDER — LORATADINE 10 MG/1
10 TABLET ORAL DAILY
Qty: 30 TABLET | Refills: 0 | Status: SHIPPED | OUTPATIENT
Start: 2021-04-05 | End: 2021-05-05

## 2021-04-05 ASSESSMENT — PATIENT HEALTH QUESTIONNAIRE - PHQ9
SUM OF ALL RESPONSES TO PHQ QUESTIONS 1-9: 0
1. LITTLE INTEREST OR PLEASURE IN DOING THINGS: 0
SUM OF ALL RESPONSES TO PHQ QUESTIONS 1-9: 0

## 2021-04-05 ASSESSMENT — ENCOUNTER SYMPTOMS
RESPIRATORY NEGATIVE: 1
ABDOMINAL PAIN: 0
RHINORRHEA: 1
SHORTNESS OF BREATH: 0

## 2021-04-05 NOTE — PATIENT INSTRUCTIONS
[facility] COVID-19 Vaccination Hotline: [phone]    COVID-19 vaccine appointments are not available through our practice. As you're eligible to receive the COVID-19 vaccine, as determined by your state's department of health, you will be able to schedule an appointment through [address] or by calling [phone]. Appointments are required to receive the COVID-19 vaccine. As vaccine supply continues to be limited, we anticipate open appointments to fill up quickly and appreciate your patience as we work through the process of providing vaccines to those in our communities. Schedule a Vaccine  When you qualify to receive the vaccine, call the [facility] COVID-19 Vaccination Hotline to schedule your appointment or to get additional information about the [facility] locations which are offering the COVID-19 vaccine. To be 94% effective, it's important that you receive two doses of one of the COVID-19 vaccines. -If you are receiving the Dyer Peter vaccine, your second shot will be scheduled as close to 21 days after the first shot as possible. -If you are receiving the Moderna vaccine, your second shot will be scheduled as close to 28 days after the first shot as possible. Links to [facility]) website and [facility] website:    [employer]/mercy-[facility]-monitoring-coronavirus-covid-19/covid-19-vaccine/[place]-vaccine    https://[employer]/covidvaccine    [place]  https://sites. google. com/view/wchd[place]-coronavirus/home/vaccines/get-vaccinated  [employer].fi. com/r/[place]OVID_Vaccine_On-Call_List      https://[employer]/shared/[email or web address]?:toolbar=n&:display_count=n&:origin=viz_share_link&:embed=y    PHARMACY LOCATIONS  Https://vaccine. coronavirus. [place].gov/    [facility]  Https://[employer]/shared/[email or web address]?:toolbar=n&:display_count=n&:origin=viz_share_link&:embed=y    [facility]  Https://[employer]/shared/[email or web address]?:toolbar=n&:display_count=n&:origin=mike_share_link&:embed=y    [address]  Https://[employer]/shared/[email or web address]?:toolbar=n&:display_count=n&:origin=[employer]_share_link&:embed=y    [address]  https://[employer]/shared/[email or web address]?:toolbar=n&:display_count=n&:origin=[employer]_Teespring_link&:embed=y    Patient Education        Alcohol Detoxification and Withdrawal: Care Instructions  Your Care Instructions     If you drink alcohol regularly and then suddenly stop, you may go through some physical and emotional problems while the alcohol clears out of your system. Clearing the alcohol from your body is called detoxification, or detox. Physical and emotional problems that may happen during detox are called withdrawal.  Symptoms of withdrawal can be scary and dangerous. Mild symptoms include nausea and vomiting, sweating, shakiness, and intense worry. Severe symptoms include being confused and irritable, feeling things on your body that are not there, seeing or hearing things that are not there, and trembling. You may even have seizures. If your symptoms become severe you must see a doctor. People who drink large amounts of alcohol should not try to detox at home. A person can die of severe alcohol withdrawal.  Symptoms of alcohol withdrawal may begin from 4 to 12 hours after you stop drinking. But they may not start for several days after the last drink. They can last a few days. It is hard to stop drinking. But when you have cleared the alcohol from your system, you will be able to start the next part of your life, free from the burden of being dependent. Follow-up care is a key part of your treatment and safety. Be sure to make and go to all appointments, and call your doctor if you are having problems. It's also a good idea to know your test results and keep a list of the medicines you take. How can you care for yourself at home?   · Before you stop drinking, talk to your doctor about how you plan to stop. Be sure to be completely honest with him or her about how much you have been drinking. Your doctor will figure out whether you need to detox in a supervised medical center. · Take your medicines exactly as prescribed. Call your doctor if you think you are having a problem with your medicine. · Make sure someone you trust is with you the whole time. Have friends and family members take turns staying with you until you are done with detox. · Put a list of emergency numbers near the phone. This should include your doctor, the police, the nearest hospital and emergency room, and neighbors who can help if needed. · Make sure all alcohol is removed from the house before you start. This includes beverages as well as medicines, rubbing alcohol, and certain flavorings like vanilla extract. · Keep \"drinking buddies\" away during the time you are going through detox. · Make your surroundings calm. Soft lights, soft music, and a comfortable place to sit or lie down can help make the process easier. · Drink lots of fluids and eat snacks such as fruit, cheese and crackers, and pretzels. Foods high in carbohydrate may help reduce the craving for alcohol. · Understand that detox is going to be hard. · Keep in mind that the people watching over you during detox are there to help. Explain to them before you start that you may not act like yourself until detox is finished. · Consider joining a support group such as Alcoholics Anonymous. Sharing your experiences with other people who face similar challenges may help you feel less overwhelmed. · Keep the numbers for these national suicide hotlines: 6-672-607-TALK (5-173.674.6151) and 4-212-IPFOKWB (6-918.307.4908). If you or someone you know talks about suicide or feeling hopeless, get help right away. When should you call for help? Call 911 anytime you think you may need emergency care.  For example, call if:

## 2021-04-05 NOTE — TELEPHONE ENCOUNTER
Spoke with Annalise Knight from Dr. Mari Babcock office about patient visit. Dr. Mari Babcock note is not signed yet, spoke to Annalise Knight about getting the note signed for his appointment on 4/6/2021. Annalise Knight stated that Dr. Mari Babcock was off last week, she will try to get him to sign it today. Annalise Knight stated Dr. Mari Babcock would like a PET scan done soon as possible, testing done for Liver cirrhosis, and Pordal HTN to be done by Dr. Awilda Corona. Did try calling Dr. Awilda Corona to let him know, he did not answer the phone did leave him a message. Did call Virgen Cotton RN to let her know of what to Dr. Mari Babcock office stated he wanted done with patient.

## 2021-04-05 NOTE — TELEPHONE ENCOUNTER
Name: Desirae Dorsey  : 1960  MRN: [id number]    Oncology Navigation- Initial Note:    Intake-  Contact Type: Telephone    Diagnosis: GI- malignant    Home Disposition: Lives with other who is able to assist    Patient needs and barriers to care: none identified today     Referral Source: Health Professional    Receptive to Advanced Care Planning/ Palliative Care:  yes    Interventions-   General Interventions: none     Education/Screenings:  no     Currently on HRT: no     Referrals: none today     Biopsy site status: esophagus       Continuum of Care: Diagnosis/Active Treatment    Notes: Patient returned writers call. Writer introduced self as navigator. Name and contact had been provided previously via . Writer explained my role in his care moving forward. Writer did inform him that I'd likely be at his f/u tomorrow with Dr. Rosangela Lawson, pt was appreciative of support. Will continue to follow.     Electronically signed by Eduardo Macdonald RN on 2021 at 2:59 PM

## 2021-04-05 NOTE — RESULT ENCOUNTER NOTE
Potassium level is fine. Continue potassium supplements. I will send a refill. One of his liver enzymes still elevated. One of them is improving. Thanks.

## 2021-04-05 NOTE — TELEPHONE ENCOUNTER
Spoke to Dr. Vaughn Nunes he wanted to clarify what other testing he wanted done for the liver and the HTN. Did call to speak with Eliceo again to let ask for what testing he wanted done. She did ask Dr. Filomena Simmons he just would like the PET from Dr. Vaughn Nunes. He referred patient to Dr. Virgen Massey for the other issues with the liver. Did let Dr. Vaughn Nunes know also.

## 2021-04-06 ENCOUNTER — TELEPHONE (OUTPATIENT)
Dept: INFUSION THERAPY | Age: 61
End: 2021-04-06

## 2021-04-06 ENCOUNTER — OFFICE VISIT (OUTPATIENT)
Dept: ONCOLOGY | Age: 61
End: 2021-04-06
Payer: MEDICARE

## 2021-04-06 VITALS
BODY MASS INDEX: 32.57 KG/M2 | TEMPERATURE: 98.4 F | WEIGHT: 227 LBS | SYSTOLIC BLOOD PRESSURE: 122 MMHG | HEART RATE: 99 BPM | DIASTOLIC BLOOD PRESSURE: 79 MMHG

## 2021-04-06 DIAGNOSIS — C15.5 MALIGNANT NEOPLASM OF LOWER THIRD OF ESOPHAGUS (HCC): ICD-10-CM

## 2021-04-06 PROCEDURE — 3017F COLORECTAL CA SCREEN DOC REV: CPT | Performed by: INTERNAL MEDICINE

## 2021-04-06 PROCEDURE — G8417 CALC BMI ABV UP PARAM F/U: HCPCS | Performed by: INTERNAL MEDICINE

## 2021-04-06 PROCEDURE — G8427 DOCREV CUR MEDS BY ELIG CLIN: HCPCS | Performed by: INTERNAL MEDICINE

## 2021-04-06 PROCEDURE — 99215 OFFICE O/P EST HI 40 MIN: CPT | Performed by: INTERNAL MEDICINE

## 2021-04-06 PROCEDURE — 99211 OFF/OP EST MAY X REQ PHY/QHP: CPT | Performed by: INTERNAL MEDICINE

## 2021-04-06 PROCEDURE — 1036F TOBACCO NON-USER: CPT | Performed by: INTERNAL MEDICINE

## 2021-04-06 NOTE — PROGRESS NOTES
Patient ID: Mirza Roman, [birth year], [id number], 63 y.o. Referred by : Dr Prema Mascorro  Reason for consultation:   Esophageal cancer T2N0Mx  Stage II  HISTORY OF PRESENT ILLNESS:    Oncologic History:  Mirza Roman is a 63 y.o. male with a history of hepatitis C, status post treatment, liver cirrhosis, history of alcohol abuse was seen during initial consultation visit for newly diagnosed esophageal cancer. Patient reportedly had \"heavy drinking alcohol in about 2016 however recently in December he had 2 beers and he presented with hematemesis. On 12/29/2020 he had upper endoscopy which showed severe portal hypertension, gastropathy. The biopsy from the GE junction showed invasive adenocarcinoma. Patient had a follow-up EGD on 2/2/2021 which confirmed esophageal adenocarcinoma. Patient had endoscopic ultrasound on 2/12/2021 which showed T2 N0 MX disease with underlying esophageal varices. In the esophagus hypoechoic lesion noted in the lower esophagus penetrating into submucosa and into muscularis propria. No lymphadenopathy noted. Patient was referred to medical oncology for further recommendations. He denies any difficulty swallowing. He does not smoke he has quit smoking in 2016. He has not drank alcohol since December. He has a history of hepatitis C and he has received treatment. He lives by himself. He is accompanied by his ex-wife during today's office visit. INTERVAL HISTORY:  Patient is returning for follow up visit to to discuss further recommendations. He was evaluated at [facility] thoracic surgery Dr Logan Webb and they recommended PET scan and GI evaluation prior to making decision about surgery. It seems they thought the surgery could be high risk given his live disease. During this visit patient's allergy, social, medical, surgical history and medications were reviewed and updated.     Past Medical History:   Diagnosis Date    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 ([address]     Alcohol abuse 6-12 beers a day; quit drinking July 2016    Arthritis     Back pain, chronic     dr. Elton Aguirre, orthopedic, every 3-4 months, gets steroid injection    Lezama esophagus     BPH (benign prostatic hypertrophy)     Cholelithiasis     Cirrhosis ([address]     COVID-19 12/2020    pt reports he tested + while at [facility] DDD (degenerative disc disease), lumbar     Esophageal varices ([address]     Fatty liver     GERD (gastroesophageal reflux disease)     Hep C w/o coma, chronic ([address]     History of colon polyps 2016    [place] (hard of hearing)     Hyperlipidemia     Hypertension     Stomach ulcer     hx of    Tobacco abuse     Tubular adenoma of colon 2016, 2018    Wears glasses        Past Surgical History:   Procedure Laterality Date    BUNIONECTOMY      twice on right side    BUNIONECTOMY Left     CARPAL TUNNEL RELEASE Right     COLONOSCOPY      at age 35    COLONOSCOPY  10/05/2016    polyps-pathology tubular adenoma, and abnormal looking mucosa right colon-pathology-tubular adenoma    COLONOSCOPY N/A 3/30/2018    COLONOSCOPY POLYPECTOMY COLD BIOPSY performed by Jaguar Corcoran MD at [address] [place] [address]  03/30/2018    Small polyp in the sigmoid colon and excised with biopsy forceps--tubular adenoma    ENDOSCOPY, COLON, DIAGNOSTIC      EGD    KNEE SURGERY Left     cyst removed    NASAL SEPTUM SURGERY      NERVE BLOCK Right 11/23/2020    NERVE BLOCK RIGHT CERVICAL STEROID INJECTION  C3-C6 performed by Cruz Jordan MD at [place] [address]  01/04/16    steroid injection C7 T1    OTHER SURGICAL HISTORY  11/21/2016    Bilateral Lumbar CACHORRO L4-L5 injections    OTHER SURGICAL HISTORY  12/19/2016    lumbar steroid injection    OTHER SURGICAL HISTORY  09/28/2018    BILATERAL L5 CACHORRO (N/A Back)    OTHER SURGICAL HISTORY Right 11/23/2020    cervical injection    PAIN MANAGEMENT PROCEDURE Left 7/9/2020    EPIDURAL STEROID INJECTION LEFT L4 L5 performed by Cruz Jordan MD at [facility]    PAIN MANAGEMENT PROCEDURE Left 7/20/2020    LEFT L4 L5 EPIDURAL STEROID INJECTION performed by Ash Ryan MD at [facility] Bilateral 8/17/2020    LUMBAR FACET BILATERAL L2-L5 performed by Ash Ryan MD at [facility] Bilateral 12/7/2020    NERVE BLOCK BILATERAL LUMBAR MEDIAL BRANCH L2-L5 performed by Ash Ryan MD at [address] AA&/STRD TFRML EPI LUMBAR/SACRAL 1 LEVEL Bilateral 9/6/2018    BILATERAL L5 CACHORRO performed by Ash Ryan MD at [address] AA&/STRD TFRML EPI LUMBAR/SACRAL 1 LEVEL N/A 9/28/2018    BILATERAL L5 CACHORRO performed by Ash Ryan MD at [facility] N/CARPAL [address] Right 8/29/2017    CARPAL TUNNEL RELEASE RIGHT performed by Aleah Rodriguez MD at [facility] N/CARPAL TUNNEL SURG Left 10/31/2017    CARPAL TUNNEL RELEASE performed by Aleah Rodriguez MD at [address] [place] [address] 12/29/2020    EGD BIOPSY performed by Lilibeth Berry MD at [address] [place] [address] 2/2/2021    EGD BIOPSY and spot marking performed by Jovanny Loya MD at [address] [place] [address] 2/12/2021    ENDOSCOPIC ULTRASOUND, EGD performed by Vero Gutiérrez MD at [address] [place] [address]  2/12/2021    EGD DIAGNOSTIC ONLY performed by Vero Gutiérrez MD at [facility]     Allergies   Allergen Reactions    Pollen Extract      Runny nose, watery eyes       Current Outpatient Medications   Medication Sig Dispense Refill    traZODone (DESYREL) 50 MG tablet Take 1 tablet by mouth nightly 90 tablet 1    loratadine (CLARITIN) 10 MG tablet Take 1 tablet by mouth daily 30 tablet 0    lactulose (CHRONULAC) 10 GM/15ML solution Take 15 mLs by mouth 4 times daily as needed      potassium chloride (KLOR-CON M) 20 MEQ extended release tablet Take 1 tablet by mouth daily for 7 days 7 tablet 0    calcium carbonate 600 MG TABS tablet take 1 tablet by mouth once daily      D3-50 1.25 MG (54942 UT) CAPS take 1 capsule by mouth every FRIDAY      hydrOXYzine (VISTARIL) 25 MG capsule take 1 capsule by mouth three times a day if needed for anxiety 90 capsule 2    atorvastatin (LIPITOR) 20 MG tablet take 1 tablet by mouth at bedtime 90 tablet 1    spironolactone (ALDACTONE) 50 MG tablet take 1 tablet by mouth once daily 90 tablet 3    vitamin D (ERGOCALCIFEROL) 1.25 MG (39796 UT) CAPS capsule take 1 capsule by mouth every week 12 capsule 1    nadolol (CORGARD) 20 MG tablet Take 1 tablet by mouth daily 30 tablet 3    rifaximin (XIFAXAN) 550 MG tablet Take 1 tablet by mouth 3 times daily 42 tablet 0    pantoprazole (PROTONIX) 40 MG tablet Take 1 tablet by mouth every morning (before breakfast) 30 tablet 3    sildenafil (VIAGRA) 100 MG tablet Take 1 tablet by mouth as needed for Erectile Dysfunction 10 tablet 3     No current facility-administered medications for this visit.         Social History     Socioeconomic History    Marital status: Single     Spouse name: Not on file    Number of children: Not on file    Years of education: Not on file    Highest education level: Not on file   Occupational History    Not on file   Social Needs    Financial resource strain: Not hard at all    Food insecurity     Worry: Never true     Inability: Never true    Transportation needs     Medical: No     Non-medical: No   Tobacco Use    Smoking status: Former Smoker     Packs/day: 1.00     Years: 45.00     Pack years: 45.00     Quit date: 2017     Years since quittin.2    Smokeless tobacco: Never Used   Substance and Sexual Activity    Alcohol use: No     Comment:     Drug use: No     Frequency: 1.0 times per week     Comment: cocaine,  stopped spring 2016    Sexual activity: Yes     Partners: Female   Lifestyle    Physical activity     Days per week: Not on file     Minutes per session: Not on PHYSICAL EXAM:   General appearance - well appearing, no in pain or distress   Mental status - alert and cooperative   Eyes - pupils equal and reactive, extraocular eye movements intact   Ears - bilateral TM's and external ear canals normal   Mouth - mucous membranes moist, pharynx normal without lesions   Neck - supple, no significant adenopathy   Lymphatics - no palpable lymphadenopathy, no hepatosplenomegaly   Chest - clear to auscultation, no wheezes, rales or rhonchi, symmetric air entry   Heart - normal rate, regular rhythm, normal S1, S2, no murmurs, rubs, clicks or gallops   Abdomen - soft, nontender, nondistended, no masses or organomegaly   Neurological - alert, oriented, normal speech, no focal findings or movement disorder noted   Musculoskeletal - no joint tenderness, deformity or swelling   Extremities - peripheral pulses normal, no pedal edema, no clubbing or cyanosis   Skin - normal coloration and turgor, no rashes, no suspicious skin lesions noted   LABORATORY DATA:     Lab Results   Component Value Date    WBC 5.0 03/31/2021    HGB 12.5 (L) 03/31/2021    HCT 38.5 (L) 03/31/2021    MCV 87.2 03/31/2021    PLT 62 (L) 03/31/2021    LYMPHOPCT 19 (L) 03/31/2021    RBC 4.41 (L) 03/31/2021    MCH 28.4 03/31/2021    MCHC 32.6 03/31/2021    RDW 14.9 03/31/2021    MONOPCT 14 (H) 03/31/2021    BASOPCT 0 03/31/2021    NEUTROABS 3.30 03/31/2021    LYMPHSABS 0.95 (L) 03/31/2021    MONOSABS 0.70 03/31/2021    EOSABS 0.00 03/31/2021    BASOSABS 0.00 03/31/2021       Chemistry        Component Value Date/Time     04/05/2021 1226    K 3.8 04/05/2021 1226     04/05/2021 1226    CO2 25 04/05/2021 1226    BUN 3 (L) 04/05/2021 1226    CREATININE 0.55 (L) 04/05/2021 1226        Component Value Date/Time    CALCIUM 9.5 04/05/2021 1226    ALKPHOS 65 04/05/2021 1226    AST 86 (H) 04/05/2021 1226    ALT 23 04/05/2021 1226    BILITOT 1.09 04/05/2021 1226        PATHOLOGY DATA:   Surgical Pathology Report  Surgical Pathology  Collected: 12/29/20 4621   Lab status: Final   Resulting lab: [facility] LAB   Value: [id number]   [facility]   [address] [place], [address] [place] [address]   [phone]   Fax: [phone]   SURGICAL PATHOLOGY REPORT     Patient Name: Vianney Guzman   MR#: [id number]   Specimen #[id number]         Final Diagnosis   GASTROESOPHAGEAL JUNCTION, BIOPSY:          INVASIVE ADENOCARCINOMA    Final Diagnosis   ESOPHAGUS, LESION AT 34 CM, BIOPSY:          INVASIVE ADENOCARCINOMA ARISING IN TUBULAR ADENOMA WITH HIGH   GRADE DYSPLASIA, ASSOCIATED WITH FOCAL INTESTINAL METAPLASIA (SEE   COMMENT)     Diagnosis Comment   The malignancy diagnosis is confirmed by review of a second   pathologist. Christie Lamb result [facility] HER2 IHC [facility]   gastroesophageal adenocarcinoma will be issued in an addendum.    ADDENDUM (SCM)     Date Ordered:     2/16/2021     Status: Signed Out        Date Complete:     2/16/2021     By: Jurgen Blanchard M.D.        Date Reported:     2/16/2021       ADDENDUM COMMENT   This addendum is issued in order to incorporate the result of HER2 by   [address] [place] [address], performed at [address] [place] [address] ([id number], 02/16/2021).       \"RESULTS:  INDETERMINATE     Interpretation:     Average HER2 signals/nucleus:  4.4   Average SUNG 17 signals/nucleus:  3.4   HER2/SUNG 17 signal ratio:  1.3   Number of Observers:  2     Even after analysis of additional cells and review of slides by a   pathologist, FISH results show a HER2/SUNG 17 ratio < 2.0 and an   average of 4-6 HER2 signals per nucleus and 3 or more SUNG 17 signals   per nucleus.  The results are INDETERMINATE.       In this situation, the 2016 CAP/ASCP/ASCO guidelines recommend   selecting a different tumor block for HER2 testing, if available. \"       Of note, there is only one block available for testing of invasive   esophageal adenocarcinoma tumor cells.  It was already used for HER2   IHC and HER2 FISH testing with the results issued in the addendums.     IMAGING DATA:    Reviewed  ASSESSMENT:    Debra Villarreal is a 63 y.o. male with history of hepatitis C, liver cirrhosis, history of alcohol abuse, with esophageal cancer. In total, I spent greater than 80 minutes in face-to-face consultation with the patient and the majority of this time was spent in education, counseling, and coordination of care. During our encounter, I personally reviewed the above history and evaluation, including radiology and pathology data, in detail  I reviewed the NCCN guidelines and goals of care. Clinically appears to have T2 N0 M0  Stag II, disease. Based on the pathology and description on endoscopies he does not seems to have high risk features, therefore he would be candidate for esophagectomy as per the guidelines however given his history of liver cirrhosis and other comorbidities he  Was thought to be likely high risk  As per thoracic surgery at [facility]. They will make recommendations after PET. If he is not a candidate for surgical resection then concurrent chemoradiation would be recommended. I reviewed the risk benefits and side effects of chemoRT. During today's visit, the patient and the family had a number of reasonable questions which were answered to their satisfaction. They verbalized understanding of the information provided and they agreed to proceed as outlined above. PLAN:   I reviewed the treatment plan with patient and family  I will get PET scan as recommended by [facility] thoracic surgery  Patient will follow with Dr Kannan Mckay at [address] [place] [address],[address] for surgical evaluation if PET negative for distant mets  If surgery Not possible, then I will consider concurrent chemoRT  RTC 2-3 weeks or earlier if needed    Garrison Nugent MD  Hematologist/Medical Oncologist      This note is created with the assistance of a speech recognition program.  While intending to generate a document that actually reflects the content of the visit, the document can still have some errors including those of syntax and sound a like substitutions which may escape proof reading. It such instances, actual meaning can be extrapolated by contextual diversion.

## 2021-04-06 NOTE — TELEPHONE ENCOUNTER
Name: Desirae Dorsey  : 1960  MRN: [id number]    Oncology Navigation Follow-Up Note    Contact Type:  Telephone    Notes: Writer met with pt face to face today during her f/u with Dr. Rosangela Lawson. Per [facility], Dr. Lurdes Cleaning is requesting a PET asap and to be done locally. Order placed today. Pt is alert and denies any major issues or complaints. Writer will track PET results and alert Dr. Rosangela Lawson once back. If neg. For mets pt [facility] work[facility], if metastatic disease noted, writer will obtain f/u with Dr. Rosangela Lawson. Dr. Rosangela Lawson explained above in detail. Pt was appreciative of support. Will continue to follow.     Electronically signed by Eduardo Macdonald RN on 2021 at 3:19 PM

## 2021-04-07 ENCOUNTER — TELEPHONE (OUTPATIENT)
Dept: ONCOLOGY | Age: 61
End: 2021-04-07

## 2021-04-07 NOTE — TELEPHONE ENCOUNTER
Sravan Hernandez, Yes I spoke to Dr. Radha Isaac and attended the pt f/u with him. Were all set.  Thank you!!

## 2021-04-08 ENCOUNTER — HOSPITAL ENCOUNTER (OUTPATIENT)
Dept: NUCLEAR MEDICINE | Age: 61
Discharge: HOME | End: 2021-04-10
Payer: MEDICARE

## 2021-04-08 VITALS — WEIGHT: 213 LBS | BODY MASS INDEX: 30.56 KG/M2

## 2021-04-08 DIAGNOSIS — C15.5 MALIGNANT NEOPLASM OF LOWER THIRD OF ESOPHAGUS (HCC): ICD-10-CM

## 2021-04-08 LAB — GLUCOSE BLD-MCNC: 93 MG/DL (ref 75–110)

## 2021-04-08 PROCEDURE — A9552 F18 FDG: HCPCS | Performed by: INTERNAL MEDICINE

## 2021-04-08 PROCEDURE — 78815 PET IMAGE W/CT SKULL-THIGH: CPT

## 2021-04-08 PROCEDURE — 82947 ASSAY GLUCOSE BLOOD QUANT: CPT

## 2021-04-08 PROCEDURE — 2580000003 HC RX 258: Performed by: INTERNAL MEDICINE

## 2021-04-08 PROCEDURE — 3430000000 HC RX DIAGNOSTIC RADIOPHARMACEUTICAL: Performed by: INTERNAL MEDICINE

## 2021-04-08 RX ORDER — SODIUM CHLORIDE 0.9 % (FLUSH) 0.9 %
10 SYRINGE (ML) INJECTION PRN
Status: DISCONTINUED | OUTPATIENT
Start: 2021-04-08 | End: 2021-04-11 | Stop reason: HOSPADM

## 2021-04-08 RX ORDER — FLUDEOXYGLUCOSE F 18 200 MCI/ML
10 INJECTION, SOLUTION INTRAVENOUS
Status: COMPLETED | OUTPATIENT
Start: 2021-04-08 | End: 2021-04-08

## 2021-04-08 RX ADMIN — SODIUM CHLORIDE, PRESERVATIVE FREE 10 ML: 5 INJECTION INTRAVENOUS at 12:58

## 2021-04-08 RX ADMIN — FLUDEOXYGLUCOSE F 18 9.7 MILLICURIE: 200 INJECTION, SOLUTION INTRAVENOUS at 12:58

## 2021-04-09 ENCOUNTER — TELEPHONE (OUTPATIENT)
Dept: ONCOLOGY | Age: 61
End: 2021-04-09

## 2021-04-09 NOTE — TELEPHONE ENCOUNTER
MD INSTRUCTIONS TO NOTIFY PT PET SHOWS NO EVIDENCE OF METASTATIC DISEASE. PT TO F/U WITH DR RIVERS IN [place].    CALLED PT AND HAD TO LEAVE MESSAGE THAT PET NEGATIVE FOR METASTASIS. [facility] PHONE NUMBER LEFT FOR PT IF ANY QUESTIONS.

## 2021-04-12 ENCOUNTER — TELEPHONE (OUTPATIENT)
Dept: ONCOLOGY | Age: 61
End: 2021-04-12

## 2021-04-12 DIAGNOSIS — C15.5 MALIGNANT NEOPLASM OF LOWER THIRD OF ESOPHAGUS (HCC): Primary | ICD-10-CM

## 2021-04-12 NOTE — TELEPHONE ENCOUNTER
Writer received a call from [place], [place] at [facility] stating that [facility] is requesting a PORT placement and gave plan of care to Dr. Alize Tompkins for chemo. Mary states that Dr. Alize Tompkins wanted writer to place order and coordinate PORT. Writer place order and reached out to [place] in [employer] for appt. [place] will schedule PORT tomorrow and alert writer of time/date. Writer did call pt to inform of above, no answer, detailed VM left. Will await a return call.

## 2021-04-13 ENCOUNTER — TELEPHONE (OUTPATIENT)
Dept: ONCOLOGY | Age: 61
End: 2021-04-13

## 2021-04-13 ENCOUNTER — TELEPHONE (OUTPATIENT)
Dept: INTERVENTIONAL RADIOLOGY/VASCULAR | Age: 61
End: 2021-04-13

## 2021-04-13 NOTE — TELEPHONE ENCOUNTER
Writer received a call from pt Abby rogers ([phone]) asking why IR is calling to set up a PORT. Writer explained to her that his nurse Alexandra Gilliland left a VM on our clinic line yesterday stating that Dr. Dhaval Browne wasn't going to do surgery and to start chemo/RT and get PORT. After writer reveived [facility] notes, there was no documentation noted. Writer called Dr. Radha Robles office and spoke to Alexandra Gilliland. She states that was the VO Dr. Dhaval Browne gave her. Writer explained that his note stated if PET was negative for mets he was to return to [facility] for hepatology and surgical clearance. She agreed and will send Dr. Dhaval Browne a message directly to confirm his plan. Nilam updated and will update her and pt once I get confirmation. Dr. Shahla Castillo updated also.

## 2021-04-15 ENCOUNTER — TELEPHONE (OUTPATIENT)
Dept: ONCOLOGY | Age: 61
End: 2021-04-15

## 2021-04-15 NOTE — TELEPHONE ENCOUNTER
Writer received a call from Dr. Anabelle Hampton, cardiothoracic surgeon from [facility]. He confirmed that his recommendation was pt to have concurrent chemo/RT up front and then return to him for surgical eval. He did say they would continue with hepatology clearance now. Writer updated pt and pt ken Demarco. Writer notes that PORT has been scheduled for 4/19. Will continue to follow.

## 2021-04-16 RX ORDER — SODIUM CHLORIDE 0.9 % (FLUSH) 0.9 %
10 SYRINGE (ML) INJECTION PRN
Status: CANCELLED | OUTPATIENT
Start: 2021-04-16

## 2021-04-19 ENCOUNTER — TELEPHONE (OUTPATIENT)
Dept: INFUSION THERAPY | Age: 61
End: 2021-04-19

## 2021-04-19 ENCOUNTER — HOSPITAL ENCOUNTER (OUTPATIENT)
Dept: INTERVENTIONAL RADIOLOGY/VASCULAR | Age: 61
Discharge: HOME | End: 2021-04-21
Payer: MEDICARE

## 2021-04-19 VITALS
OXYGEN SATURATION: 96 % | TEMPERATURE: 97.3 F | BODY MASS INDEX: 30.78 KG/M2 | HEIGHT: 70 IN | DIASTOLIC BLOOD PRESSURE: 76 MMHG | RESPIRATION RATE: 18 BRPM | WEIGHT: 215 LBS | SYSTOLIC BLOOD PRESSURE: 131 MMHG | HEART RATE: 82 BPM

## 2021-04-19 DIAGNOSIS — C15.5 MALIGNANT NEOPLASM OF LOWER THIRD OF ESOPHAGUS (HCC): Primary | ICD-10-CM

## 2021-04-19 DIAGNOSIS — C15.5 MALIGNANT NEOPLASM OF LOWER THIRD OF ESOPHAGUS (HCC): ICD-10-CM

## 2021-04-19 LAB
INR BLD: 1.2
PARTIAL THROMBOPLASTIN TIME: 35.1 SEC (ref 24–36)
PLATELET # BLD: 147 K/UL (ref 150–450)
PROTHROMBIN TIME: 15.2 SEC (ref 11.8–14.6)

## 2021-04-19 PROCEDURE — 99153 MOD SED SAME PHYS/QHP EA: CPT

## 2021-04-19 PROCEDURE — 36561 INSERT TUNNELED CV CATH: CPT | Performed by: RADIOLOGY

## 2021-04-19 PROCEDURE — 2709999900 IR PORT PLACEMENT > 5 YEARS

## 2021-04-19 PROCEDURE — 85049 AUTOMATED PLATELET COUNT: CPT

## 2021-04-19 PROCEDURE — 6360000002 HC RX W HCPCS: Performed by: RADIOLOGY

## 2021-04-19 PROCEDURE — 36415 COLL VENOUS BLD VENIPUNCTURE: CPT

## 2021-04-19 PROCEDURE — 7100000031 HC ASPR PHASE II RECOVERY - ADDTL 15 MIN

## 2021-04-19 PROCEDURE — 77001 FLUOROGUIDE FOR VEIN DEVICE: CPT | Performed by: RADIOLOGY

## 2021-04-19 PROCEDURE — 76937 US GUIDE VASCULAR ACCESS: CPT | Performed by: RADIOLOGY

## 2021-04-19 PROCEDURE — 7100000030 HC ASPR PHASE II RECOVERY - FIRST 15 MIN

## 2021-04-19 PROCEDURE — 85610 PROTHROMBIN TIME: CPT

## 2021-04-19 PROCEDURE — 99152 MOD SED SAME PHYS/QHP 5/>YRS: CPT

## 2021-04-19 PROCEDURE — 2580000003 HC RX 258: Performed by: RADIOLOGY

## 2021-04-19 PROCEDURE — 85730 THROMBOPLASTIN TIME PARTIAL: CPT

## 2021-04-19 RX ORDER — MIDAZOLAM HYDROCHLORIDE 1 MG/ML
INJECTION INTRAMUSCULAR; INTRAVENOUS
Status: COMPLETED | OUTPATIENT
Start: 2021-04-19 | End: 2021-04-19

## 2021-04-19 RX ORDER — SODIUM CHLORIDE 9 MG/ML
INJECTION, SOLUTION INTRAVENOUS CONTINUOUS
Status: DISCONTINUED | OUTPATIENT
Start: 2021-04-19 | End: 2021-04-22 | Stop reason: HOSPADM

## 2021-04-19 RX ORDER — HEPARIN SODIUM (PORCINE) LOCK FLUSH IV SOLN 100 UNIT/ML 100 UNIT/ML
SOLUTION INTRAVENOUS
Status: COMPLETED | OUTPATIENT
Start: 2021-04-19 | End: 2021-04-19

## 2021-04-19 RX ORDER — FENTANYL CITRATE 50 UG/ML
INJECTION, SOLUTION INTRAMUSCULAR; INTRAVENOUS
Status: COMPLETED | OUTPATIENT
Start: 2021-04-19 | End: 2021-04-19

## 2021-04-19 RX ORDER — SODIUM CHLORIDE 0.9 % (FLUSH) 0.9 %
10 SYRINGE (ML) INJECTION PRN
Status: DISCONTINUED | OUTPATIENT
Start: 2021-04-19 | End: 2021-04-22 | Stop reason: HOSPADM

## 2021-04-19 RX ORDER — ACETAMINOPHEN 325 MG/1
650 TABLET ORAL EVERY 4 HOURS PRN
Status: DISCONTINUED | OUTPATIENT
Start: 2021-04-19 | End: 2021-04-22 | Stop reason: HOSPADM

## 2021-04-19 RX ADMIN — SODIUM CHLORIDE, PRESERVATIVE FREE 500 UNITS: 5 INJECTION INTRAVENOUS at 12:13

## 2021-04-19 RX ADMIN — MIDAZOLAM 1 MG: 1 INJECTION INTRAMUSCULAR; INTRAVENOUS at 11:56

## 2021-04-19 RX ADMIN — FENTANYL CITRATE 50 MCG: 50 INJECTION, SOLUTION INTRAMUSCULAR; INTRAVENOUS at 11:56

## 2021-04-19 RX ADMIN — MIDAZOLAM 0.5 MG: 1 INJECTION INTRAMUSCULAR; INTRAVENOUS at 12:08

## 2021-04-19 RX ADMIN — CEFAZOLIN 1000 MG: 1 INJECTION, POWDER, FOR SOLUTION INTRAMUSCULAR; INTRAVENOUS at 11:07

## 2021-04-19 RX ADMIN — FENTANYL CITRATE 50 MCG: 50 INJECTION, SOLUTION INTRAMUSCULAR; INTRAVENOUS at 12:08

## 2021-04-19 ASSESSMENT — PAIN SCALES - GENERAL
PAINLEVEL_OUTOF10: 0

## 2021-04-19 NOTE — H&P
junction showed invasive adenocarcinoma. Patient had a follow-up EGD on 2/2/2021 which confirmed esophageal adenocarcinoma. Patient had endoscopic ultrasound on 2/12/2021 which showed T2 N0 MX disease with underlying esophageal varices. In the esophagus hypoechoic lesion noted in the lower esophagus penetrating into submucosa and into muscularis propria. No lymphadenopathy noted. Patient was referred to medical oncology for further recommendations. He denies any difficulty swallowing. He does not smoke he has quit smoking in 2016. He has not drank alcohol since December. He has a history of hepatitis C and he has received treatment.     Pt reports persistent dysphagia with foods and liquids. Reports he has a persistent sore throat with cough which is occasionally productive. Reports intermittent nausea without associated vomiting. Denies chest pain/pressure, shortness of breath, hemoptysis, hematemesis, bloating, loss of appetite or unintentional weight loss. NPO. Took am medications with sip of water. Denies taking any blood thinning medications. PET CT completed on 04/08/2021  Narrative   EXAMINATION:   WHOLE BODY PET/CT       4/8/2021       TECHNIQUE:   Following IV injection of 9.7 mCi of F 18 -FDG, PET  tumor imaging was   acquired from the base of the skull to the mid thighs.  Computed tomography   was used for purposes of attenuation correction and anatomic localization.    Fusion imaging was utilized for interpretation.       Uptake time 55 mins.  Glucose level 93 mg/dl.       COMPARISON:   CT chest abdomen pelvis 03/12/2021       HISTORY:   ORDERING SYSTEM PROVIDED HISTORY: Malignant neoplasm of lower third of   esophagus ([address]   TECHNOLOGIST PROVIDED HISTORY: Evaluate for mets   Reason for Exam: malignant neoplasm of lower third of esophagus   Type of Exam: Initial       FINDINGS:   HEAD/NECK: No metabolically active cervical lymphadenopathy.       CHEST: No metabolically active pulmonary nodules.  No metabolically active   axillary, hilar, or mediastinal lymphadenopathy.       ABDOMEN/PELVIS: No metabolically active intraperitoneal mass.  No   metabolically active abdominal or pelvic lymphadenopathy.  Physiologic   activity in the gastrointestinal and genitourinary systems.       BONES/SOFT TISSUE: No abnormal FDG activity localizes to the bones.  No   aggressive osseous lesion.       INCIDENTAL CT FINDINGS: Redemonstration of cirrhotic hepatic morphology. Cholelithiasis.   Apparent calcified diverticulum off the posteromedial   gastric fundus.  Please see recent CT chest, abdomen and pelvis for further   detail.           Impression   Negative PET-CT with no evidence of metastatic disease         PAST MEDICAL HISTORY     Past Medical History:   Diagnosis Date    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     Alcohol abuse     6-12 beers a day; quit drinking July 2016    Arthritis     Back pain, chronic     dr. Ava Rubio, orthopedic, every 3-4 months, gets steroid injection    Lezama esophagus     BPH (benign prostatic hypertrophy)     Cholelithiasis     Cirrhosis ([facility]     COVID-19 12/2020    pt reports he tested + while at [facility] DDD (degenerative disc disease), lumbar     Esophageal varices ([facility]     Fatty liver     GERD (gastroesophageal reflux disease)     Hep C w/o coma, chronic ([facility]     History of blood transfusion     History of colon polyps 2016    [place] (hard of hearing)     Hyperlipidemia     Hypertension     Stomach ulcer     hx of    Tobacco abuse     Tubular adenoma of colon 2016, 2018    Wears glasses        SURGICAL HISTORY       Past Surgical History:   Procedure Laterality Date    BUNIONECTOMY      twice on right side    BUNIONECTOMY Left     CARPAL TUNNEL RELEASE Right     COLONOSCOPY      at age 35    COLONOSCOPY  10/05/2016    polyps-pathology tubular adenoma, and abnormal looking mucosa right colon-pathology-tubular adenoma    COLONOSCOPY N/A 3/30/2018 COLONOSCOPY POLYPECTOMY COLD BIOPSY performed by Darryn Stock MD at [place] [address]  03/30/2018    Small polyp in the sigmoid colon and excised with biopsy forceps--tubular adenoma    ENDOSCOPY, COLON, DIAGNOSTIC      EGD    KNEE SURGERY Left     cyst removed    NASAL SEPTUM SURGERY      NERVE BLOCK Right 11/23/2020    NERVE BLOCK RIGHT CERVICAL STEROID INJECTION  C3-C6 performed by Tolu Escobar MD at [place] [address]  01/04/16    steroid injection C7 T1    OTHER SURGICAL HISTORY  11/21/2016    Bilateral Lumbar CACHORRO L4-L5 injections    OTHER SURGICAL HISTORY  12/19/2016    lumbar steroid injection    OTHER SURGICAL HISTORY  09/28/2018    BILATERAL L5 CACHORRO (N/A Back)    OTHER SURGICAL HISTORY Right 11/23/2020    cervical injection    PAIN MANAGEMENT PROCEDURE Left 7/9/2020    EPIDURAL STEROID INJECTION LEFT L4 L5 performed by Tolu Escobar MD at [facility] Left 7/20/2020    LEFT L4 L5 EPIDURAL STEROID INJECTION performed by Tolu Escobar MD at [facility] Bilateral 8/17/2020    LUMBAR FACET BILATERAL L2-L5 performed by Tolu Escobar MD at [facility] Bilateral 12/7/2020    NERVE BLOCK BILATERAL LUMBAR MEDIAL BRANCH L2-L5 performed by Tolu Escobar MD at [address] AA&/STRD TFRML EPI LUMBAR/SACRAL 1 LEVEL Bilateral 9/6/2018    BILATERAL L5 CACHORRO performed by Tolu Escobar MD at [address] AA&/STRD TFRML EPI LUMBAR/SACRAL 1 LEVEL N/A 9/28/2018    BILATERAL L5 CACHORRO performed by Tolu Escobar MD at [address] [facility] N/CARPAL [address] Right 8/29/2017    CARPAL TUNNEL RELEASE RIGHT performed by Sandee White MD at [address] N/CARPAL TUNNEL SURG Left 10/31/2017    CARPAL TUNNEL RELEASE performed by Sandee White MD at [place] [address] 12/29/2020    EGD BIOPSY performed by Valentin Max MD at [facility] GASTROINTESTINAL ENDOSCOPY N/A 2021    EGD BIOPSY and spot marking performed by Kitty Brian MD at [facility] N/A 2021    ENDOSCOPIC ULTRASOUND, EGD performed by Leonardo Bautista MD at [facility] ENDOSCOPY  2021    EGD DIAGNOSTIC ONLY performed by Leonardo Bautista MD at [address] [place] HISTORY       Family History   Problem Relation Age of Onset    Cancer Mother         pancreatic    Cancer Father         bone    Diabetes Sister     Asthma Brother        SOCIAL HISTORY       Social History     Socioeconomic History    Marital status: Single     Spouse name: None    Number of children: None    Years of education: None    Highest education level: None   Occupational History    None   Social Needs    Financial resource strain: Not hard at all   true[x] Media-Pam insecurity     Worry: Never true     Inability: Never true    Transportation needs     Medical: No     Non-medical: No   Tobacco Use    Smoking status: Former Smoker     Packs/day: 1.00     Years: 45.00     Pack years: 45.00     Quit date: 2017     Years since quittin.2    Smokeless tobacco: Never Used   Substance and Sexual Activity    Alcohol use: No     Comment:     Drug use: No     Frequency: 1.0 times per week     Comment: cocaine,  stopped spring 2016    Sexual activity: Yes     Partners: Female   Lifestyle    Physical activity     Days per week: None     Minutes per session: None    Stress: None   Relationships    Social connections     Talks on phone: None     Gets together: None     Attends Baptism service: None     Active member of club or organization: None     Attends meetings of clubs or organizations: None     Relationship status: None    Intimate partner violence     Fear of current or ex partner: None     Emotionally abused: None     Physically abused: None     Forced sexual activity: None Other Topics Concern    None   Social History Narrative     in the past, retired           REVIEW OF SYSTEMS      Allergies   Allergen Reactions    Pollen Extract      Runny nose, watery eyes       Current Outpatient Medications on File Prior to Encounter   Medication Sig Dispense Refill    traZODone (DESYREL) 50 MG tablet Take 1 tablet by mouth nightly 90 tablet 1    loratadine (CLARITIN) 10 MG tablet Take 1 tablet by mouth daily 30 tablet 0    lactulose (CHRONULAC) 10 GM/15ML solution Take 15 mLs by mouth 4 times daily as needed      potassium chloride (KLOR-CON M) 20 MEQ extended release tablet Take 1 tablet by mouth daily for 7 days 7 tablet 0    D3-50 1.25 MG (87950 UT) CAPS once a week On Tuesdays      hydrOXYzine (VISTARIL) 25 MG capsule take 1 capsule by mouth three times a day if needed for anxiety 90 capsule 2    atorvastatin (LIPITOR) 20 MG tablet take 1 tablet by mouth at bedtime 90 tablet 1    spironolactone (ALDACTONE) 50 MG tablet take 1 tablet by mouth once daily 90 tablet 3    vitamin D (ERGOCALCIFEROL) 1.25 MG (55962 UT) CAPS capsule take 1 capsule by mouth every week 12 capsule 1    nadolol (CORGARD) 20 MG tablet Take 1 tablet by mouth daily 30 tablet 3    pantoprazole (PROTONIX) 40 MG tablet Take 1 tablet by mouth every morning (before breakfast) 30 tablet 3    sildenafil (VIAGRA) 100 MG tablet Take 1 tablet by mouth as needed for Erectile Dysfunction 10 tablet 3    calcium carbonate 600 MG TABS tablet take 1 tablet by mouth once daily      rifaximin (XIFAXAN) 550 MG tablet Take 1 tablet by mouth 3 times daily 42 tablet 0     No current facility-administered medications on file prior to encounter. Negative except for what is mentioned in the HPI. GENERAL PHYSICAL EXAM     Vitals: /78   Pulse 78   Temp 96.8 °F (36 °C) (Infrared)   Resp 20   Ht 5' 10\" (1.778 m)   Wt 215 lb (97.5 kg)   SpO2 96%   BMI 30.85 kg/m²  Body mass index is 30.85 kg/m².  Elevated LFTs 08/12/2020    Seasonal allergies 08/12/2020    S/P epidural steroid injection 08/05/2020    Essential hypertension 04/24/2019    Recurrent major depressive disorder in partial remission ([facility] 04/24/2019    Pure hypercholesterolemia 02/04/2019    Hypokalemia 02/04/2019    Vitamin D deficiency 09/20/2017    History of hepatitis C 09/11/2017    Ganglion cyst 05/31/2017    Carpal tunnel syndrome of right wrist 05/31/2017    Tinnitus 03/23/2017    Eustachian tube dysfunction 03/23/2017    Lumbar radiculitis 11/08/2016    Lumbar disc herniation 11/08/2016    Gynecomastia, male 10/26/2016    Depression 10/13/2016    Chronic bilateral low back pain with left-sided sciatica 10/06/2016    DDD (degenerative disc disease), lumbar 10/06/2016    Hepatic cirrhosis ([address] 09/15/2016    Psychophysiologic insomnia 09/14/2016    Cirrhosis (HCC)     Hep C w/o coma, chronic (HCC)     Fatty liver     Calculus of gallbladder without cholecystitis 08/10/2016    Chronic viral hepatitis B without delta agent and without coma ([address] 07/22/2016    Hypomagnesemia     Alcohol withdrawal syndrome without complication ([facility] 63/61/5692    Cervical radicular pain 01/04/2016    Tubular adenoma of colon 01/01/2016    History of colon polyps 01/01/2016    Back pain, chronic 04/19/2012    Hearing difficulty 04/19/2012    GERD (gastroesophageal reflux disease) 04/19/2012           MASSIMO Gilliam - CNP on 4/19/2021 at 9:24 AM

## 2021-04-19 NOTE — PRE SEDATION
Sedation Pre-Procedure Note    Patient Name: Corinna Vargas   YOB: 1960  Room/Bed: Room/bed info not found  Medical Record Number: [id number]  Date: 4/19/2021   Time: 11:49 AM       Indication:  Esophageal cancer    Consent: I have discussed with the patient and/or the patient representative the indication, alternatives, and the possible risks and/or complications of the planned procedure and the anesthesia methods. The patient and/or patient representative appear to understand and agree to proceed. Vital Signs:   Vitals:    04/19/21 0842   BP: 139/78   Pulse: 78   Resp: 20   Temp: 96.8 °F (36 °C)   SpO2: 96%       Past Medical History:   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 ([facility] Alcohol abuse, Arthritis, Back pain, chronic, Lezama esophagus, BPH (benign prostatic hypertrophy), Cholelithiasis, Cirrhosis ([address] COVID-19, DDD (degenerative disc disease), lumbar, Esophageal varices ([facility] Fatty liver, GERD (gastroesophageal reflux disease), Hep C w/o coma, chronic ([address] History of blood transfusion, History of colon polyps, [place] (hard of hearing), Hyperlipidemia, Hypertension, Stomach ulcer, Tobacco abuse, Tubular adenoma of colon, and Wears glasses. Past Surgical History:   has a past surgical history that includes Bunionectomy; Nasal septum surgery; other surgical history (01/04/16); Colonoscopy; Colonoscopy (10/05/2016); other surgical history (11/21/2016); other surgical history (12/19/2016); knee surgery (Left); Bunionectomy (Left); Endoscopy, colon, diagnostic; pr revise median n/carpal tunnel surg (Right, 8/29/2017); Carpal tunnel release (Right); pr revise median n/carpal tunnel surg (Left, 10/31/2017); Colonoscopy (N/A, 3/30/2018); Colonoscopy (03/30/2018); pr njx aa&/strd tfrml epi lumbar/sacral 1 level (Bilateral, 9/6/2018); other surgical history (09/28/2018); pr njx aa&/strd tfrml epi lumbar/sacral 1 level (N/A, 9/28/2018);  Pain management procedure (Left, 7/9/2020); Pain management procedure (Left, 7/20/2020); Pain management procedure (Bilateral, 8/17/2020); other surgical history (Right, 11/23/2020); Nerve Block (Right, 11/23/2020); Pain management procedure (Bilateral, 12/7/2020); Upper gastrointestinal endoscopy (N/A, 12/29/2020); Upper gastrointestinal endoscopy (N/A, 2/2/2021); Upper gastrointestinal endoscopy (N/A, 2/12/2021); Upper gastrointestinal endoscopy (2/12/2021); and [place] tooth extraction. Medications:   Scheduled Meds:    ceFAZolin  1,000 mg Intravenous Q8H     Continuous Infusions:   PRN Meds: sodium chloride flush  Home Meds:   Prior to Admission medications    Medication Sig Start Date End Date Taking?  Authorizing Provider   traZODone (DESYREL) 50 MG tablet Take 1 tablet by mouth nightly 4/5/21  Yes MASSIMO Weaver CNP   loratadine (CLARITIN) 10 MG tablet Take 1 tablet by mouth daily 4/5/21 5/5/21 Yes MASSIMO Weaver CNP   lactulose (CHRONULAC) 10 GM/15ML solution Take 15 mLs by mouth 4 times daily as needed 1/22/21  Yes Historical Provider, MD   potassium chloride (KLOR-CON M) 20 MEQ extended release tablet Take 1 tablet by mouth daily for 7 days 3/31/21 4/19/21 Yes Patricia Nassar DO   D3-50 1.25 MG (67431 UT) CAPS once a week On Tuesdays 1/22/21  Yes Historical Provider, MD   hydrOXYzine (VISTARIL) 25 MG capsule take 1 capsule by mouth three times a day if needed for anxiety 2/23/21  Yes MASSIMO Coleman CNP   atorvastatin (LIPITOR) 20 MG tablet take 1 tablet by mouth at bedtime 2/23/21  Yes MASSIMO Coleman CNP   spironolactone (ALDACTONE) 50 MG tablet take 1 tablet by mouth once daily 2/23/21  Yes MASSIMO Coleman CNP   vitamin D (ERGOCALCIFEROL) 1.25 MG (78617 UT) CAPS capsule take 1 capsule by mouth every week 2/23/21  Yes MASSIMO Coleman - CNP   nadolol (CORGARD) 20 MG tablet Take 1 tablet by mouth daily 12/31/20  Yes Latricia Ribera MD   pantoprazole (PROTONIX) 40 MG tablet Take 1 tablet by mouth every morning (before breakfast) 12/31/20  Yes Elisa Rockwell MD   sildenafil (VIAGRA) 100 MG tablet Take 1 tablet by mouth as needed for Erectile Dysfunction 5/21/20  Yes MASSIMO Sheets - CNP   calcium carbonate 600 MG TABS tablet take 1 tablet by mouth once daily 1/22/21   Historical Provider, MD   rifaximin (XIFAXAN) 550 MG tablet Take 1 tablet by mouth 3 times daily 12/30/20   Elisa Rockwell MD     Coumadin Use Last 7 Days:  no  Antiplatelet drug therapy use last 7 days: no  Other anticoagulant use last 7 days: no  Additional Medication Information:  See [facility]      Pre-Sedation Documentation and Exam:   I have reviewed the patient's history and review of systems.     Mallampati Airway Assessment:  Mallampati Class II - (soft palate, fauces & uvula are visible)    Prior History of Anesthesia Complications:   none    ASA Classification:  Class 2 - A normal healthy patient with mild systemic disease    Sedation/ Anesthesia Plan:   intravenous sedation    Medications Planned:   midazolam (Versed) intravenously and fentanyl intravenously    Patient is an appropriate candidate for plan of sedation: yes    Electronically signed by Gaby Marquez MD on 4/19/2021 at 11:49 AM

## 2021-04-19 NOTE — POST SEDATION
Sedation Post Procedure Note    Patient Name: Antonio Christian   YOB: 1960  Room/Bed: Room/bed info not found  Medical Record Number: [id number]  Date: 4/19/2021   Time: 12:32 PM         Physicians/Assistants: Alessandra Rea MD    Procedure Performed:  Port placement    Post-Sedation Vital Signs:  Vitals:    04/19/21 1215   BP: (!) 122/2   Pulse: 80   Resp: 21   Temp:    SpO2: 95%      Vital signs were reviewed and were stable after the procedure (see flow sheet for vitals)            Complications: none    Electronically signed by Alessandra Rea MD on 4/19/2021 at 12:32 PM

## 2021-04-19 NOTE — TELEPHONE ENCOUNTER
Chemotherapy orders received:    Ht=70 inches  Fj=755 lbs  BSA=2.19  Chemotherapy doses verified:  Taxol 50 mg/m2 = 109 mg  Carboplatin AUC 2 to be determined on day of treatment   Suze Johnson RN

## 2021-04-19 NOTE — BRIEF OP NOTE
Brief Postoperative Note    Luis Castro  YOB: 1960  [id number]    Pre-operative Diagnosis: Esophageal cancer    Post-operative Diagnosis: Same    Procedure: Port placement    Anesthesia: Moderate Sedation    Surgeons/Assistants: Gregg    Estimated Blood Loss: less than 50     Complications: None    Specimens: Was Not Obtained    Electronically signed by Preston Virk MD on 4/19/2021 at 12:32 PM

## 2021-04-19 NOTE — TELEPHONE ENCOUNTER
Chemo orders received, ht 70\", wt 215 lbs, and bsa 2.19 verified. Dose of chemotherapy verified:  Taxol 50mg/m2 = 109mg  Carbo AUC 2 to be calculated at every tx.    Sukhdeep Palacio

## 2021-04-20 ENCOUNTER — HOSPITAL ENCOUNTER (OUTPATIENT)
Dept: RADIATION ONCOLOGY | Age: 61
Discharge: HOME | End: 2021-04-20
Attending: RADIOLOGY
Payer: MEDICARE

## 2021-04-20 ENCOUNTER — TELEPHONE (OUTPATIENT)
Dept: ONCOLOGY | Age: 61
End: 2021-04-20

## 2021-04-20 PROCEDURE — 77470 SPECIAL RADIATION TREATMENT: CPT | Performed by: RADIOLOGY

## 2021-04-20 PROCEDURE — 77334 RADIATION TREATMENT AID(S): CPT | Performed by: RADIOLOGY

## 2021-04-20 PROCEDURE — 77263 THER RADIOLOGY TX PLNG CPLX: CPT | Performed by: RADIOLOGY

## 2021-04-20 NOTE — PROGRESS NOTES
Pt here today for teach and simulation scan. Radiation and You information provided along with additional education regarding radiation therapy and what to expect. Pt verbalizes understanding and all questions answered to the best of my knowledge. Samples of aquaphor and community resource information provided. Pt escorted to CT room without any difficulty. Port accessed after 2 attempts. Pt needs a 1 inch bob needle. Positive blood return and flushed with ease. De accessed after procedure complete.

## 2021-04-20 NOTE — TELEPHONE ENCOUNTER
[facility] Nutrition Note    PG-SGA screening tool reviewed with score of 16. Pt reports problems swallowing, back pain, n/v, consuming less than normal amounts of food, and able to do little activity and spends most of day in bed/chair. Full nutrition assessment for scores > 4. Will follow up for full nutrition assessment.     TYRONE Stubbs, RD, LD  Registered Dietitian  Carolyn Nevarez  [phone]

## 2021-04-22 ENCOUNTER — OFFICE VISIT (OUTPATIENT)
Dept: ONCOLOGY | Age: 61
End: 2021-04-22
Payer: MEDICARE

## 2021-04-22 DIAGNOSIS — C15.5 MALIGNANT NEOPLASM OF LOWER THIRD OF ESOPHAGUS (HCC): Primary | ICD-10-CM

## 2021-04-22 PROCEDURE — 99999 PR OFFICE/OUTPT VISIT,PROCEDURE ONLY: CPT | Performed by: INTERNAL MEDICINE

## 2021-04-22 PROCEDURE — 99213 OFFICE O/P EST LOW 20 MIN: CPT | Performed by: INTERNAL MEDICINE

## 2021-04-22 RX ORDER — ONDANSETRON 8 MG/1
8 TABLET, ORALLY DISINTEGRATING ORAL 3 TIMES DAILY PRN
Qty: 90 TABLET | Refills: 2 | Status: SHIPPED | OUTPATIENT
Start: 2021-04-22 | End: 2021-12-20

## 2021-04-22 RX ORDER — PROCHLORPERAZINE MALEATE 10 MG
10 TABLET ORAL EVERY 6 HOURS PRN
Qty: 120 TABLET | Refills: 3 | Status: ON HOLD | OUTPATIENT
Start: 2021-04-22 | End: 2021-08-31

## 2021-04-22 RX ORDER — DEXAMETHASONE 4 MG/1
TABLET ORAL
Qty: 30 TABLET | Refills: 2 | Status: CANCELLED | OUTPATIENT
Start: 2021-04-22

## 2021-04-22 RX ORDER — DEXAMETHASONE 4 MG/1
TABLET ORAL
Qty: 30 TABLET | Refills: 2 | Status: SHIPPED | OUTPATIENT
Start: 2021-04-22 | End: 2021-08-25 | Stop reason: ALTCHOICE

## 2021-04-22 RX ORDER — LIDOCAINE AND PRILOCAINE 25; 25 MG/G; MG/G
CREAM TOPICAL
Qty: 1 TUBE | Refills: 2 | Status: SHIPPED | OUTPATIENT
Start: 2021-04-22 | End: 2022-05-05

## 2021-04-23 ENCOUNTER — HOSPITAL ENCOUNTER (OUTPATIENT)
Dept: RADIATION ONCOLOGY | Age: 61
Discharge: HOME | End: 2021-04-23
Attending: RADIOLOGY
Payer: MEDICARE

## 2021-04-23 PROCEDURE — 77301 RADIOTHERAPY DOSE PLAN IMRT: CPT | Performed by: RADIOLOGY

## 2021-04-23 PROCEDURE — 77338 DESIGN MLC DEVICE FOR IMRT: CPT | Performed by: RADIOLOGY

## 2021-04-23 PROCEDURE — 77300 RADIATION THERAPY DOSE PLAN: CPT | Performed by: RADIOLOGY

## 2021-04-26 ENCOUNTER — TELEPHONE (OUTPATIENT)
Dept: ONCOLOGY | Age: 61
End: 2021-04-26

## 2021-04-26 NOTE — TELEPHONE ENCOUNTER
called patient to introduce self and services.  left message encouraging patient to call if needed.

## 2021-04-29 ENCOUNTER — TELEPHONE (OUTPATIENT)
Dept: ONCOLOGY | Age: 61
End: 2021-04-29

## 2021-04-29 NOTE — TELEPHONE ENCOUNTER
Writer called pt care giver Demian Harris to assure she was able to obtain pt XRT schedule. She did confirm she spoke to [employer] appreciative of f/u. Will continue to follow.

## 2021-05-04 ENCOUNTER — HOSPITAL ENCOUNTER (OUTPATIENT)
Dept: RADIATION ONCOLOGY | Age: 61
Discharge: HOME | End: 2021-05-04
Attending: RADIOLOGY
Payer: MEDICARE

## 2021-05-04 ENCOUNTER — TELEPHONE (OUTPATIENT)
Dept: ONCOLOGY | Age: 61
End: 2021-05-04

## 2021-05-04 ENCOUNTER — OFFICE VISIT (OUTPATIENT)
Dept: ONCOLOGY | Age: 61
End: 2021-05-04
Payer: MEDICARE

## 2021-05-04 ENCOUNTER — HOSPITAL ENCOUNTER (OUTPATIENT)
Dept: INFUSION THERAPY | Age: 61
Discharge: HOME | End: 2021-05-04
Payer: MEDICARE

## 2021-05-04 VITALS
HEART RATE: 68 BPM | SYSTOLIC BLOOD PRESSURE: 149 MMHG | BODY MASS INDEX: 31.75 KG/M2 | DIASTOLIC BLOOD PRESSURE: 82 MMHG | WEIGHT: 221.8 LBS | TEMPERATURE: 98.4 F | RESPIRATION RATE: 18 BRPM | HEIGHT: 70 IN

## 2021-05-04 VITALS
BODY MASS INDEX: 31.82 KG/M2 | TEMPERATURE: 98.4 F | WEIGHT: 221.8 LBS | DIASTOLIC BLOOD PRESSURE: 86 MMHG | HEART RATE: 65 BPM | SYSTOLIC BLOOD PRESSURE: 133 MMHG

## 2021-05-04 DIAGNOSIS — C15.5 MALIGNANT NEOPLASM OF LOWER THIRD OF ESOPHAGUS (HCC): Primary | ICD-10-CM

## 2021-05-04 DIAGNOSIS — C15.5 MALIGNANT NEOPLASM OF LOWER THIRD OF ESOPHAGUS (HCC): ICD-10-CM

## 2021-05-04 LAB
ABSOLUTE EOS #: 0.1 K/UL (ref 0–0.4)
ABSOLUTE IMMATURE GRANULOCYTE: ABNORMAL K/UL (ref 0–0.3)
ABSOLUTE LYMPH #: 1.6 K/UL (ref 1–4.8)
ABSOLUTE MONO #: 0.5 K/UL (ref 0.1–1.2)
ALBUMIN SERPL-MCNC: 3.7 G/DL (ref 3.5–5.2)
ALBUMIN/GLOBULIN RATIO: 1 (ref 1–2.5)
ALP BLD-CCNC: 78 U/L (ref 40–129)
ALT SERPL-CCNC: 10 U/L (ref 5–41)
ANION GAP SERPL CALCULATED.3IONS-SCNC: 12 MMOL/L (ref 9–17)
AST SERPL-CCNC: 46 U/L
BASOPHILS # BLD: 0 % (ref 0–2)
BASOPHILS ABSOLUTE: 0 K/UL (ref 0–0.2)
BILIRUB SERPL-MCNC: 1.14 MG/DL (ref 0.3–1.2)
BUN BLDV-MCNC: 4 MG/DL (ref 8–23)
BUN/CREAT BLD: ABNORMAL (ref 9–20)
CALCIUM SERPL-MCNC: 8.5 MG/DL (ref 8.6–10.4)
CHLORIDE BLD-SCNC: 103 MMOL/L (ref 98–107)
CO2: 24 MMOL/L (ref 20–31)
CREAT SERPL-MCNC: 0.47 MG/DL (ref 0.7–1.2)
DIFFERENTIAL TYPE: ABNORMAL
EOSINOPHILS RELATIVE PERCENT: 2 % (ref 1–4)
GFR AFRICAN AMERICAN: >60 ML/MIN
GFR NON-AFRICAN AMERICAN: >60 ML/MIN
GFR SERPL CREATININE-BSD FRML MDRD: ABNORMAL ML/MIN/{1.73_M2}
GFR SERPL CREATININE-BSD FRML MDRD: ABNORMAL ML/MIN/{1.73_M2}
GLUCOSE BLD-MCNC: 103 MG/DL (ref 70–99)
HCT VFR BLD CALC: 38 % (ref 41–53)
HEMOGLOBIN: 12.4 G/DL (ref 13.5–17.5)
IMMATURE GRANULOCYTES: ABNORMAL %
LYMPHOCYTES # BLD: 37 % (ref 24–44)
MCH RBC QN AUTO: 28.3 PG (ref 26–34)
MCHC RBC AUTO-ENTMCNC: 32.6 G/DL (ref 31–37)
MCV RBC AUTO: 86.8 FL (ref 80–100)
MONOCYTES # BLD: 11 % (ref 2–11)
NRBC AUTOMATED: ABNORMAL PER 100 WBC
PDW BLD-RTO: 15.8 % (ref 12.5–15.4)
PLATELET # BLD: 112 K/UL (ref 140–450)
PLATELET ESTIMATE: ABNORMAL
PMV BLD AUTO: 8.1 FL (ref 6–12)
POTASSIUM SERPL-SCNC: 4.2 MMOL/L (ref 3.7–5.3)
RBC # BLD: 4.37 M/UL (ref 4.5–5.9)
RBC # BLD: ABNORMAL 10*6/UL
SEG NEUTROPHILS: 50 % (ref 36–66)
SEGMENTED NEUTROPHILS ABSOLUTE COUNT: 2.3 K/UL (ref 1.8–7.7)
SODIUM BLD-SCNC: 139 MMOL/L (ref 135–144)
TOTAL PROTEIN: 7.4 G/DL (ref 6.4–8.3)
WBC # BLD: 4.5 K/UL (ref 3.5–11)
WBC # BLD: ABNORMAL 10*3/UL

## 2021-05-04 PROCEDURE — 36591 DRAW BLOOD OFF VENOUS DEVICE: CPT

## 2021-05-04 PROCEDURE — G8427 DOCREV CUR MEDS BY ELIG CLIN: HCPCS | Performed by: INTERNAL MEDICINE

## 2021-05-04 PROCEDURE — 99211 OFF/OP EST MAY X REQ PHY/QHP: CPT | Performed by: INTERNAL MEDICINE

## 2021-05-04 PROCEDURE — 99215 OFFICE O/P EST HI 40 MIN: CPT | Performed by: INTERNAL MEDICINE

## 2021-05-04 PROCEDURE — 80053 COMPREHEN METABOLIC PANEL: CPT

## 2021-05-04 PROCEDURE — 2500000003 HC RX 250 WO HCPCS: Performed by: INTERNAL MEDICINE

## 2021-05-04 PROCEDURE — 3017F COLORECTAL CA SCREEN DOC REV: CPT | Performed by: INTERNAL MEDICINE

## 2021-05-04 PROCEDURE — 2580000003 HC RX 258: Performed by: INTERNAL MEDICINE

## 2021-05-04 PROCEDURE — 85025 COMPLETE CBC W/AUTO DIFF WBC: CPT

## 2021-05-04 PROCEDURE — 96417 CHEMO IV INFUS EACH ADDL SEQ: CPT

## 2021-05-04 PROCEDURE — 6360000002 HC RX W HCPCS: Performed by: INTERNAL MEDICINE

## 2021-05-04 PROCEDURE — 96415 CHEMO IV INFUSION ADDL HR: CPT

## 2021-05-04 PROCEDURE — 77014 PR CT GUIDANCE PLACEMENT RAD THERAPY FIELDS: CPT | Performed by: RADIOLOGY

## 2021-05-04 PROCEDURE — G8417 CALC BMI ABV UP PARAM F/U: HCPCS | Performed by: INTERNAL MEDICINE

## 2021-05-04 PROCEDURE — 96413 CHEMO IV INFUSION 1 HR: CPT

## 2021-05-04 PROCEDURE — 1036F TOBACCO NON-USER: CPT | Performed by: INTERNAL MEDICINE

## 2021-05-04 PROCEDURE — 77386 HC NTSTY MODUL RAD TX DLVR CPLX: CPT | Performed by: RADIOLOGY

## 2021-05-04 PROCEDURE — 96375 TX/PRO/DX INJ NEW DRUG ADDON: CPT

## 2021-05-04 RX ORDER — SODIUM CHLORIDE 9 MG/ML
INJECTION, SOLUTION INTRAVENOUS CONTINUOUS
Status: CANCELLED | OUTPATIENT
Start: 2021-05-18

## 2021-05-04 RX ORDER — DIPHENHYDRAMINE HYDROCHLORIDE 50 MG/ML
50 INJECTION INTRAMUSCULAR; INTRAVENOUS ONCE
Status: CANCELLED | OUTPATIENT
Start: 2021-05-04

## 2021-05-04 RX ORDER — DIPHENHYDRAMINE HYDROCHLORIDE 50 MG/ML
50 INJECTION INTRAMUSCULAR; INTRAVENOUS ONCE
Status: CANCELLED | OUTPATIENT
Start: 2021-05-18

## 2021-05-04 RX ORDER — MEPERIDINE HYDROCHLORIDE 50 MG/ML
12.5 INJECTION INTRAMUSCULAR; INTRAVENOUS; SUBCUTANEOUS ONCE
Status: CANCELLED | OUTPATIENT
Start: 2021-05-04 | End: 2021-05-04

## 2021-05-04 RX ORDER — HEPARIN SODIUM (PORCINE) LOCK FLUSH IV SOLN 100 UNIT/ML 100 UNIT/ML
500 SOLUTION INTRAVENOUS PRN
Status: CANCELLED | OUTPATIENT
Start: 2021-05-11

## 2021-05-04 RX ORDER — SODIUM CHLORIDE 9 MG/ML
20 INJECTION, SOLUTION INTRAVENOUS ONCE
Status: CANCELLED | OUTPATIENT
Start: 2021-05-04 | End: 2021-05-04

## 2021-05-04 RX ORDER — SODIUM CHLORIDE 9 MG/ML
25 INJECTION, SOLUTION INTRAVENOUS PRN
Status: CANCELLED | OUTPATIENT
Start: 2021-05-04

## 2021-05-04 RX ORDER — METHYLPREDNISOLONE SODIUM SUCCINATE 125 MG/2ML
125 INJECTION, POWDER, LYOPHILIZED, FOR SOLUTION INTRAMUSCULAR; INTRAVENOUS ONCE
Status: CANCELLED | OUTPATIENT
Start: 2021-05-04 | End: 2021-05-04

## 2021-05-04 RX ORDER — SODIUM CHLORIDE 0.9 % (FLUSH) 0.9 %
5-40 SYRINGE (ML) INJECTION PRN
Status: DISCONTINUED | OUTPATIENT
Start: 2021-05-04 | End: 2021-05-05 | Stop reason: HOSPADM

## 2021-05-04 RX ORDER — MEPERIDINE HYDROCHLORIDE 50 MG/ML
12.5 INJECTION INTRAMUSCULAR; INTRAVENOUS; SUBCUTANEOUS ONCE
Status: CANCELLED | OUTPATIENT
Start: 2021-05-18 | End: 2021-05-11

## 2021-05-04 RX ORDER — DEXAMETHASONE SODIUM PHOSPHATE 10 MG/ML
10 INJECTION INTRAMUSCULAR; INTRAVENOUS ONCE
Status: COMPLETED | OUTPATIENT
Start: 2021-05-04 | End: 2021-05-04

## 2021-05-04 RX ORDER — METHYLPREDNISOLONE SODIUM SUCCINATE 125 MG/2ML
125 INJECTION, POWDER, LYOPHILIZED, FOR SOLUTION INTRAMUSCULAR; INTRAVENOUS ONCE
Status: CANCELLED | OUTPATIENT
Start: 2021-05-18 | End: 2021-05-11

## 2021-05-04 RX ORDER — SODIUM CHLORIDE 9 MG/ML
20 INJECTION, SOLUTION INTRAVENOUS ONCE
Status: CANCELLED | OUTPATIENT
Start: 2021-05-18 | End: 2021-05-11

## 2021-05-04 RX ORDER — SODIUM CHLORIDE 0.9 % (FLUSH) 0.9 %
5-40 SYRINGE (ML) INJECTION PRN
Status: CANCELLED | OUTPATIENT
Start: 2021-05-04

## 2021-05-04 RX ORDER — PALONOSETRON 0.05 MG/ML
0.25 INJECTION, SOLUTION INTRAVENOUS ONCE
Status: CANCELLED | OUTPATIENT
Start: 2021-05-04

## 2021-05-04 RX ORDER — SODIUM CHLORIDE 0.9 % (FLUSH) 0.9 %
5-40 SYRINGE (ML) INJECTION PRN
Status: CANCELLED | OUTPATIENT
Start: 2021-05-11

## 2021-05-04 RX ORDER — SODIUM CHLORIDE 9 MG/ML
INJECTION, SOLUTION INTRAVENOUS CONTINUOUS
Status: CANCELLED | OUTPATIENT
Start: 2021-05-04

## 2021-05-04 RX ORDER — DIPHENHYDRAMINE HYDROCHLORIDE 50 MG/ML
50 INJECTION INTRAMUSCULAR; INTRAVENOUS ONCE
Status: COMPLETED | OUTPATIENT
Start: 2021-05-04 | End: 2021-05-04

## 2021-05-04 RX ORDER — PALONOSETRON 0.05 MG/ML
0.25 INJECTION, SOLUTION INTRAVENOUS ONCE
Status: COMPLETED | OUTPATIENT
Start: 2021-05-04 | End: 2021-05-04

## 2021-05-04 RX ORDER — HEPARIN SODIUM (PORCINE) LOCK FLUSH IV SOLN 100 UNIT/ML 100 UNIT/ML
500 SOLUTION INTRAVENOUS PRN
Status: CANCELLED | OUTPATIENT
Start: 2021-05-04

## 2021-05-04 RX ORDER — EPINEPHRINE 1 MG/ML
0.3 INJECTION, SOLUTION, CONCENTRATE INTRAVENOUS PRN
Status: CANCELLED | OUTPATIENT
Start: 2021-05-04

## 2021-05-04 RX ORDER — EPINEPHRINE 1 MG/ML
0.3 INJECTION, SOLUTION, CONCENTRATE INTRAVENOUS PRN
Status: CANCELLED | OUTPATIENT
Start: 2021-05-18

## 2021-05-04 RX ORDER — HEPARIN SODIUM (PORCINE) LOCK FLUSH IV SOLN 100 UNIT/ML 100 UNIT/ML
500 SOLUTION INTRAVENOUS PRN
Status: DISCONTINUED | OUTPATIENT
Start: 2021-05-04 | End: 2021-05-05 | Stop reason: HOSPADM

## 2021-05-04 RX ORDER — SODIUM CHLORIDE 9 MG/ML
25 INJECTION, SOLUTION INTRAVENOUS PRN
Status: CANCELLED | OUTPATIENT
Start: 2021-05-18

## 2021-05-04 RX ORDER — DIPHENHYDRAMINE HYDROCHLORIDE 50 MG/ML
50 INJECTION INTRAMUSCULAR; INTRAVENOUS ONCE
Status: CANCELLED | OUTPATIENT
Start: 2021-05-04 | End: 2021-05-04

## 2021-05-04 RX ORDER — SODIUM CHLORIDE 9 MG/ML
20 INJECTION, SOLUTION INTRAVENOUS ONCE
Status: COMPLETED | OUTPATIENT
Start: 2021-05-04 | End: 2021-05-04

## 2021-05-04 RX ORDER — PALONOSETRON 0.05 MG/ML
0.25 INJECTION, SOLUTION INTRAVENOUS ONCE
Status: CANCELLED | OUTPATIENT
Start: 2021-05-18

## 2021-05-04 RX ORDER — DIPHENHYDRAMINE HYDROCHLORIDE 50 MG/ML
50 INJECTION INTRAMUSCULAR; INTRAVENOUS ONCE
Status: CANCELLED | OUTPATIENT
Start: 2021-05-18 | End: 2021-05-11

## 2021-05-04 RX ADMIN — DEXAMETHASONE SODIUM PHOSPHATE 10 MG: 10 INJECTION INTRAMUSCULAR; INTRAVENOUS at 09:40

## 2021-05-04 RX ADMIN — SODIUM CHLORIDE, PRESERVATIVE FREE 10 ML: 5 INJECTION INTRAVENOUS at 08:25

## 2021-05-04 RX ADMIN — PALONOSETRON 0.25 MG: 0.05 INJECTION, SOLUTION INTRAVENOUS at 09:33

## 2021-05-04 RX ADMIN — SODIUM CHLORIDE 20 ML/HR: 9 INJECTION, SOLUTION INTRAVENOUS at 09:32

## 2021-05-04 RX ADMIN — CARBOPLATIN 300 MG: 10 INJECTION INTRAVENOUS at 11:27

## 2021-05-04 RX ADMIN — SODIUM CHLORIDE, PRESERVATIVE FREE 10 ML: 5 INJECTION INTRAVENOUS at 12:06

## 2021-05-04 RX ADMIN — FAMOTIDINE 20 MG: 10 INJECTION, SOLUTION INTRAVENOUS at 09:37

## 2021-05-04 RX ADMIN — PACLITAXEL 114 MG: 300 INJECTION, SOLUTION INTRAVENOUS at 10:18

## 2021-05-04 RX ADMIN — DIPHENHYDRAMINE HYDROCHLORIDE 50 MG: 50 INJECTION, SOLUTION INTRAMUSCULAR; INTRAVENOUS at 09:34

## 2021-05-04 RX ADMIN — HEPARIN 500 UNITS: 100 SYRINGE at 12:06

## 2021-05-04 NOTE — PROGRESS NOTES
Pt here for C.1D1 Carboplatin/Taxol. Arrives ambulatory; accompanied by his ex-wife. Labs drawn from port, results reviewed. Pt was seen by Dr. Worthy Sandro, order rec'd to proceed with tx. Tx complete without incident. After de-accessing port; blood noted at site; cotton taped to site and direct pressure applied. Pt d/c'd in stable condition. Returns 5/11/21 for C.2D1 Carbo/Taxol.

## 2021-05-04 NOTE — TELEPHONE ENCOUNTER
AVS from 5/4/21      Treatment as planned   RTc next week    *tx as scheduled   *rv is scheduled for 5/14/21 @11:45a per-    PT was given AVS and an appt schedule

## 2021-05-04 NOTE — PROGRESS NOTES
Patient ID: Corinna Vargas, [birth year], [id number], 63 y.o. Referred by : Dr Luana Hernandez  Reason for consultation:   Esophageal cancer T2N0Mx  Stage II  Plan to start preoperative chemoradiation on 5/4/2021  HISTORY OF PRESENT ILLNESS:    Oncologic History:  Corinna Vargas is a 63 y.o. male with a history of hepatitis C, status post treatment, liver cirrhosis, history of alcohol abuse was seen during initial consultation visit for newly diagnosed esophageal cancer. Patient reportedly had \"heavy drinking alcohol in about 2016 however recently in December he had 2 beers and he presented with hematemesis. On 12/29/2020 he had upper endoscopy which showed severe portal hypertension, gastropathy. The biopsy from the GE junction showed invasive adenocarcinoma. Patient had a follow-up EGD on 2/2/2021 which confirmed esophageal adenocarcinoma. Patient had endoscopic ultrasound on 2/12/2021 which showed T2 N0 MX disease with underlying esophageal varices. In the esophagus hypoechoic lesion noted in the lower esophagus penetrating into submucosa and into muscularis propria. No lymphadenopathy noted. Patient was referred to medical oncology for further recommendations. He denies any difficulty swallowing. He does not smoke he has quit smoking in 2016. He has not drank alcohol since December. He has a history of hepatitis C and he has received treatment. He lives by himself. He is accompanied by his ex-wife during today's office visit. INTERVAL HISTORY:  Patient is returning for follow up visit to to discuss further recommendations. He was evaluated at [facility] thoracic surgery Dr Emmanuel Hester and his PET scan showed no evidence of metastatic disease. Discussed with Dr. Velásquez's office and plan to start on concurrent chemoradiation. During this visit patient's allergy, social, medical, surgical history and medications were reviewed and updated.     Past Medical History:   Diagnosis Date    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 [facility]     Alcohol abuse     6-12 beers a day; quit drinking July 2016    Arthritis     Back pain, chronic     dr. Arletha Pallas, orthopedic, every 3-4 months, gets steroid injection    Lezama esophagus     BPH (benign prostatic hypertrophy)     Cholelithiasis     Cirrhosis ([facility]     COVID-19 12/2020    pt reports he tested + while at [facility] DDD (degenerative disc disease), lumbar     Esophageal varices ([facility]     Fatty liver     GERD (gastroesophageal reflux disease)     Hep C w/o coma, chronic ([facility]     History of blood transfusion     History of colon polyps 2016    [place] (hard of hearing)     Hyperlipidemia     Hypertension     Stomach ulcer     hx of    Tobacco abuse     Tubular adenoma of colon 2016, 2018    Wears glasses        Past Surgical History:   Procedure Laterality Date    BUNIONECTOMY      twice on right side    BUNIONECTOMY Left     CARPAL TUNNEL RELEASE Right     COLONOSCOPY      at age 35    COLONOSCOPY  10/05/2016    polyps-pathology tubular adenoma, and abnormal looking mucosa right colon-pathology-tubular adenoma    COLONOSCOPY N/A 3/30/2018    COLONOSCOPY POLYPECTOMY COLD BIOPSY performed by Marisela Garcia MD at [address] [place] [address]  03/30/2018    Small polyp in the sigmoid colon and excised with biopsy forceps--tubular adenoma    ENDOSCOPY, COLON, DIAGNOSTIC      EGD    IR PORT PLACEMENT EQUAL OR GREATER THAN 5 YEARS  4/19/2021    IR PORT PLACEMENT EQUAL OR GREATER THAN 5 YEARS 4/19/2021 [facility] SPECIAL PROCEDURES    KNEE SURGERY Left     cyst removed    NASAL SEPTUM SURGERY      NERVE BLOCK Right 11/23/2020    NERVE BLOCK RIGHT CERVICAL STEROID INJECTION  C3-C6 performed by Pankaj Martinez MD at [address] [place] [address]  01/04/16    steroid injection C7 T1    OTHER SURGICAL HISTORY  11/21/2016    Bilateral Lumbar CACHORRO L4-L5 injections    OTHER SURGICAL HISTORY  12/19/2016    lumbar steroid injection    OTHER SURGICAL HISTORY  09/28/2018 BILATERAL L5 CACHORRO (N/A Back)    OTHER SURGICAL HISTORY Right 11/23/2020    cervical injection    PAIN MANAGEMENT PROCEDURE Left 7/9/2020    EPIDURAL STEROID INJECTION LEFT L4 L5 performed by Pankaj Martinez MD at [facility] Left 7/20/2020    LEFT L4 L5 EPIDURAL STEROID INJECTION performed by Pankaj Martinez MD at [facility] Bilateral 8/17/2020    LUMBAR FACET BILATERAL L2-L5 performed by Pankaj Martinez MD at [facility] Bilateral 12/7/2020    NERVE BLOCK BILATERAL LUMBAR MEDIAL BRANCH L2-L5 performed by Pankaj Martinez MD at [address] AA&/STRD TFRML EPI LUMBAR/SACRAL 1 LEVEL Bilateral 9/6/2018    BILATERAL L5 CACHORRO performed by Pankaj Martinez MD at [address] AA&/STRD TFRML EPI LUMBAR/SACRAL 1 LEVEL N/A 9/28/2018    BILATERAL L5 CACHORRO performed by Pankaj Martinez MD at [facility] N/CARPAL TUNNEL SURG Right 8/29/2017    CARPAL TUNNEL RELEASE RIGHT performed by Tashi Solomon MD at [facility] N/CARPAL TUNNEL SURG Left 10/31/2017    CARPAL TUNNEL RELEASE performed by Tashi Solomon MD at [facility] 12/29/2020    EGD BIOPSY performed by Chago Aaron MD at [place] [address] N/A 2/2/2021    EGD BIOPSY and spot marking performed by Marisela Garcia MD at [place] [address] N/A 2/12/2021    ENDOSCOPIC ULTRASOUND, EGD performed by Casandra Tucker MD at [address] [place] [address]  2/12/2021    EGD DIAGNOSTIC ONLY performed by Casandra Tucker MD at [address] [place] [address] EXTRACTION       Allergies   Allergen Reactions    Pollen Extract      Runny nose, watery eyes       Current Outpatient Medications   Medication Sig Dispense Refill    lidocaine-prilocaine (EMLA) 2.5-2.5 % cream Apply topically 45-60 mins prior to needle poke daily PRN 1 Tube 2    prochlorperazine (COMPAZINE) 10 MG tablet Take 1 tablet by mouth every 6 hours as needed (nausea vomiting) 120 tablet 3    ondansetron (ZOFRAN-ODT) 8 MG TBDP disintegrating tablet Place 1 tablet under the tongue 3 times daily as needed for Nausea or Vomiting 90 tablet 2    dexamethasone (DECADRON) 4 MG tablet Take 5 tabs (20 mg) 12 and 6 hours before scheduled PACLItaxel (Taxol) infusion. 30 tablet 2    traZODone (DESYREL) 50 MG tablet Take 1 tablet by mouth nightly 90 tablet 1    loratadine (CLARITIN) 10 MG tablet Take 1 tablet by mouth daily 30 tablet 0    lactulose (CHRONULAC) 10 GM/15ML solution Take 15 mLs by mouth 4 times daily as needed      calcium carbonate 600 MG TABS tablet take 1 tablet by mouth once daily      D3-50 1.25 MG (70273 UT) CAPS once a week On Tuesdays      hydrOXYzine (VISTARIL) 25 MG capsule take 1 capsule by mouth three times a day if needed for anxiety 90 capsule 2    atorvastatin (LIPITOR) 20 MG tablet take 1 tablet by mouth at bedtime 90 tablet 1    spironolactone (ALDACTONE) 50 MG tablet take 1 tablet by mouth once daily 90 tablet 3    vitamin D (ERGOCALCIFEROL) 1.25 MG (88156 UT) CAPS capsule take 1 capsule by mouth every week 12 capsule 1    nadolol (CORGARD) 20 MG tablet Take 1 tablet by mouth daily 30 tablet 3    rifaximin (XIFAXAN) 550 MG tablet Take 1 tablet by mouth 3 times daily 42 tablet 0    pantoprazole (PROTONIX) 40 MG tablet Take 1 tablet by mouth every morning (before breakfast) 30 tablet 3    sildenafil (VIAGRA) 100 MG tablet Take 1 tablet by mouth as needed for Erectile Dysfunction 10 tablet 3    potassium chloride (KLOR-CON M) 20 MEQ extended release tablet Take 1 tablet by mouth daily for 7 days (Patient not taking: Reported on 5/4/2021) 7 tablet 0     No current facility-administered medications for this visit.         Social History     Socioeconomic History    Marital status: Single     Spouse name: Not on file    Number of children: Not on file  Years of education: Not on file    Highest education level: Not on file   Occupational History    Not on file   Social Needs    Financial resource strain: Not hard at all    Food insecurity     Worry: Never true     Inability: Never true   [employer] needs     Medical: No     Non-medical: No   Tobacco Use    Smoking status: Former Smoker     Packs/day: 1.00     Years: 45.00     Pack years: 45.00     Quit date: 2017     Years since quittin.2    Smokeless tobacco: Never Used   Substance and Sexual Activity    Alcohol use: No     Comment:     Drug use: No     Frequency: 1.0 times per week     Comment: cocaine,  stopped spring 2016    Sexual activity: Yes     Partners: Female   Lifestyle    Physical activity     Days per week: Not on file     Minutes per session: Not on file    Stress: Not on file   Relationships    Social connections     Talks on phone: Not on file     Gets together: Not on file     Attends Muslim service: Not on file     Active member of club or organization: Not on file     Attends meetings of clubs or organizations: Not on file     Relationship status: Not on file    Intimate partner violence     Fear of current or ex partner: Not on file     Emotionally abused: Not on file     Physically abused: Not on file     Forced sexual activity: Not on file   Other Topics Concern    Not on file   Social History Narrative     in the past, retired       Family History   Problem Relation Age of Onset    Cancer Mother         pancreatic    Cancer Father         bone    Diabetes Sister     Asthma Brother         REVIEW OF SYSTEM:     Constitutional: No fever or chills.  No night sweats, no weight loss   Eyes: No eye discharge, double vision, or eye pain   HEENT: negative for sore mouth, sore throat, hoarseness and voice change   Respiratory: negative for cough , sputum, dyspnea, wheezing, hemoptysis, chest pain   Cardiovascular: negative for chest pain, dyspnea, palpitations, orthopnea, PND   Gastrointestinal: negative for nausea, vomiting, diarrhea, constipation, abdominal pain, Dysphagia, hematemesis and hematochezia   Genitourinary: negative for frequency, dysuria, nocturia, urinary incontinence, and hematuria   Integument: negative for rash, skin lesions, bruises. Hematologic/Lymphatic: negative for easy bruising, bleeding, lymphadenopathy, petechiae and swelling/edema   Endocrine: negative for heat or cold intolerance, tremor, weight changes, change in bowel habits and hair loss   Musculoskeletal: negative for myalgias, arthralgias, pain, joint swelling,and bone pain   Neurological: negative for headaches, dizziness, seizures, weakness, numbness       OBJECTIVE:         There were no vitals filed for this visit.     PHYSICAL EXAM:   General appearance - well appearing, no in pain or distress   Mental status - alert and cooperative   Eyes - pupils equal and reactive, extraocular eye movements intact   Ears - bilateral TM's and external ear canals normal   Mouth - mucous membranes moist, pharynx normal without lesions   Neck - supple, no significant adenopathy   Lymphatics - no palpable lymphadenopathy, no hepatosplenomegaly   Chest - clear to auscultation, no wheezes, rales or rhonchi, symmetric air entry   Heart - normal rate, regular rhythm, normal S1, S2, no murmurs, rubs, clicks or gallops   Abdomen - soft, nontender, nondistended, no masses or organomegaly   Neurological - alert, oriented, normal speech, no focal findings or movement disorder noted   Musculoskeletal - no joint tenderness, deformity or swelling   Extremities - peripheral pulses normal, no pedal edema, no clubbing or cyanosis   Skin - normal coloration and turgor, no rashes, no suspicious skin lesions noted   LABORATORY DATA:     Lab Results   Component Value Date    WBC 4.5 05/04/2021    HGB 12.4 (L) 05/04/2021    HCT 38.0 (L) 05/04/2021    MCV 86.8 05/04/2021     (L) 05/04/2021 LYMPHOPCT 37 05/04/2021    RBC 4.37 (L) 05/04/2021    MCH 28.3 05/04/2021    MCHC 32.6 05/04/2021    RDW 15.8 (H) 05/04/2021    MONOPCT 11 05/04/2021    BASOPCT 0 05/04/2021    NEUTROABS 2.30 05/04/2021    LYMPHSABS 1.60 05/04/2021    MONOSABS 0.50 05/04/2021    EOSABS 0.10 05/04/2021    BASOSABS 0.00 05/04/2021       Chemistry        Component Value Date/Time     05/04/2021 0818    K 4.2 05/04/2021 0818     05/04/2021 0818    CO2 24 05/04/2021 0818    BUN 4 (L) 05/04/2021 0818    CREATININE 0.47 (L) 05/04/2021 0818        Component Value Date/Time    CALCIUM 8.5 (L) 05/04/2021 0818    ALKPHOS 78 05/04/2021 0818    AST 46 (H) 05/04/2021 0818    ALT 10 05/04/2021 0818    BILITOT 1.14 05/04/2021 0818        PATHOLOGY DATA:   Surgical Pathology Report  Surgical Pathology  Collected: 12/29/20 1334   Lab status: Final   Resulting lab: [facility]   Value: [id number]   [address]   [phone]   Fax: [phone]   SURGICAL PATHOLOGY REPORT     Patient Name: Mare Pedraza   MR#: [id number]   Specimen #[id number]         Final Diagnosis   GASTROESOPHAGEAL JUNCTION, BIOPSY:          INVASIVE ADENOCARCINOMA    Final Diagnosis   ESOPHAGUS, LESION AT 34 CM, BIOPSY:          INVASIVE ADENOCARCINOMA ARISING IN TUBULAR ADENOMA WITH HIGH   GRADE DYSPLASIA, ASSOCIATED WITH FOCAL INTESTINAL METAPLASIA (SEE   COMMENT)     Diagnosis Comment   The malignancy diagnosis is confirmed by review of a second   pathologist. Jenise Purcell result of HER2 IHC with reflex to [facility] for   gastroesophageal adenocarcinoma will be issued in an addendum.    ADDENDUM (SCM)     Date Ordered:     2/16/2021     Status: Signed Out        Date Complete:     2/16/2021     By: Latrelle Vanessa, M.D.        Date Reported:     2/16/2021       ADDENDUM COMMENT   This addendum is issued in order to incorporate the result of HER2 by   FISH, performed at [facility] ([id number], 02/16/2021).       \"RESULTS:  INDETERMINATE     Interpretation:     Average HER2 signals/nucleus:  4.4   Average SUNG 17 signals/nucleus:  3.4   HER2/SUNG 17 signal ratio:  1.3   Number of Observers:  2     Even after analysis of additional cells and review of slides by a   pathologist, FISH results show a HER2/SUNG 17 ratio < 2.0 and an   average of 4-6 HER2 signals per nucleus and 3 or more SUNG 17 signals   per nucleus.  The results are INDETERMINATE.       In this situation, the 2016 CAP/ASCP/ASCO guidelines recommend   selecting a different tumor block for HER2 testing, if available. \"       Of note, there is only one block available for testing of invasive   esophageal adenocarcinoma tumor cells.  It was already used for HER2   IHC and HER2 FISH testing with the results issued in the addendums.     IMAGING DATA:    Reviewed  ASSESSMENT:    Meggan Aleman is a 63 y.o. male with history of hepatitis C, liver cirrhosis, history of alcohol abuse, with esophageal cancer. In total, I spent greater than 80 minutes in face-to-face consultation with the patient and the majority of this time was spent in education, counseling, and coordination of care. During our encounter, I personally reviewed the above history and evaluation, including radiology and pathology data, in detail  I reviewed the NCCN guidelines and goals of care. Clinically appears to have T2 N0 M0  Stag II, disease. I reviewed the risk benefits and side effects of chemoRT. During today's visit, the patient and the family had a number of reasonable questions which were answered to their satisfaction. They verbalized understanding of the information provided and they agreed to proceed as outlined above.      PLAN:   I reviewed the treatment plan with patient and family  I the results of PET scan  Discussed with Dr. Velásquez's office from [address] [place] [address],[address]  Discussed with radiation oncology  Plan to start concurrent chemoradiation today  Return to clinic in 1 week or earlier if needed    Garrison Ballesteros MD  Hematologist/Medical Oncologist    On this date 5/23/21  I have spent 40 minutes reviewing previous notes, test results and face to face with the patient discussing the diagnosis and importance of compliance with the treatment plan. Greater than 50% of that time was spent face-to-face with the patient in counseling and coordinating her care. This note is created with the assistance of a speech recognition program.  While intending to generate a document that actually reflects the content of the visit, the document can still have some errors including those of syntax and sound a like substitutions which may escape proof reading. It such instances, actual meaning can be extrapolated by contextual diversion.

## 2021-05-05 ENCOUNTER — HOSPITAL ENCOUNTER (OUTPATIENT)
Dept: RADIATION ONCOLOGY | Age: 61
Discharge: HOME | End: 2021-05-05
Attending: RADIOLOGY
Payer: MEDICARE

## 2021-05-05 VITALS
BODY MASS INDEX: 32.14 KG/M2 | TEMPERATURE: 97.9 F | OXYGEN SATURATION: 94 % | WEIGHT: 224 LBS | HEART RATE: 88 BPM | SYSTOLIC BLOOD PRESSURE: 147 MMHG | RESPIRATION RATE: 20 BRPM | DIASTOLIC BLOOD PRESSURE: 86 MMHG

## 2021-05-05 DIAGNOSIS — C15.5 MALIGNANT NEOPLASM OF LOWER THIRD OF ESOPHAGUS (HCC): Primary | ICD-10-CM

## 2021-05-05 PROCEDURE — 77014 PR CT GUIDANCE PLACEMENT RAD THERAPY FIELDS: CPT | Performed by: RADIOLOGY

## 2021-05-05 PROCEDURE — 77386 HC NTSTY MODUL RAD TX DLVR CPLX: CPT | Performed by: RADIOLOGY

## 2021-05-05 PROCEDURE — 77336 RADIATION PHYSICS CONSULT: CPT | Performed by: RADIOLOGY

## 2021-05-05 NOTE — PROGRESS NOTES
[facility]            Radiation Oncology          [address]          [place] [address]        Daniel Parikhhy: [phone]        F: [phone]       [email or web address]             RADIATION ONCOLOGY WEEKLY PROGRESS NOTE  Patient ID:   Mauri De La Cruz  : 1960   MRN: [id number]    DIAGNOSIS:  Cancer Staging  Malignant neoplasm of lower third of esophagus ([facility] [address]  Staging form: Esophagus - Adenocarcinoma, AJCC 8th Edition  - Clinical: Stage IIB (cT2, cN0, cM0) - Signed by Rosy Faustin MD on 3/18/2021      TREATMENT DETAILS:  Treatment Site: Esophagus  Actual Dose: 360 cGy  Total Planned Dose: 5040 cGy  Treatment Technique: IMRT  Fraction Technique: Daily  Therapy imaging monitoring: CBCT daily  Concurrent Chemotherapy: Yes    SUBJECTIVE:   Patient seen for their weekly on treatment evaluation today. He noted no changes after treatment yesterday. He received chemotherapy yesterday. He was feeling lightheaded today. He notes no other changes. He has no calf pain. OBJECTIVE:   CHAPERONE: Not Required    ECO Asymptomatic    VITAL SIGNS: BP (!) 147/86   Pulse 88   Temp 97.9 °F (36.6 °C)   Resp 20   SpO2 94%   Wt Readings from Last 5 Encounters:   21 221 lb 12.8 oz (100.6 kg)   21 221 lb 12.8 oz (100.6 kg)   21 215 lb (97.5 kg)   21 213 lb (96.6 kg)   21 227 lb (103 kg)     GENERAL: He was awake, alert and oriented. He was seated comfortably in a wheelchair. HEENT: Normocephalic. HEART:  Regular rate and rhythm. LUNGS: He was not tachypneic. EXTREMITIES: He had no lower extremity edema. No calf tenderness.     LABS:  WBC   Date Value Ref Range Status   2021 4.5 3.5 - 11.0 k/uL Final   2021 5.0 3.5 - 11.0 k/uL Final   2020 4.3 3.5 - 11.0 k/uL Final     Segs Absolute   Date Value Ref Range Status   2021 2.30 1.8 - 7.7 k/uL Final   2021 3.30 1.3 - 9.1 k/uL Final   2020 2.76 1.3 - 9.1 k/uL Final Hemoglobin   Date Value Ref Range Status   05/04/2021 12.4 (L) 13.5 - 17.5 g/dL Final   03/31/2021 12.5 (L) 13.5 - 17.5 g/dL Final   12/30/2020 11.8 (L) 13.5 - 17.5 g/dL Final     Platelet Count   Date Value Ref Range Status   04/19/2012 308 140 - 450 k/uL Final     Platelets   Date Value Ref Range Status   05/04/2021 112 (L) 140 - 450 k/uL Final   04/19/2021 147 (L) 150 - 450 k/uL Final   03/31/2021 62 (L) 150 - 450 k/uL Final     CREATININE   Date Value Ref Range Status   05/04/2021 0.47 (L) 0.70 - 1.20 mg/dL Final   04/05/2021 0.55 (L) 0.70 - 1.20 mg/dL Final   03/31/2021 0.57 (L) 0.70 - 1.20 mg/dL Final     No results found for: , CEA  PSA   Date Value Ref Range Status   03/23/2017 0.44 <4.1 ug/L Final     Comment:     The Roche \"ECLIA\" assay is used. Results obtained with different assay methods   cannot be used interchangeably. Performed at [address], [address] [place] [address] [phone]     04/19/2012 0.54 0 - 4 ug/L Final     Comment:     Performed at [address], [address] [phone]       MEDICATIONS:    Current Outpatient Medications:     lidocaine-prilocaine (EMLA) 2.5-2.5 % cream, Apply topically 45-60 mins prior to needle poke daily PRN, Disp: 1 Tube, Rfl: 2    prochlorperazine (COMPAZINE) 10 MG tablet, Take 1 tablet by mouth every 6 hours as needed (nausea vomiting), Disp: 120 tablet, Rfl: 3    ondansetron (ZOFRAN-ODT) 8 MG TBDP disintegrating tablet, Place 1 tablet under the tongue 3 times daily as needed for Nausea or Vomiting, Disp: 90 tablet, Rfl: 2    dexamethasone (DECADRON) 4 MG tablet, Take 5 tabs (20 mg) 12 and 6 hours before scheduled PACLItaxel (Taxol) infusion. , Disp: 30 tablet, Rfl: 2    traZODone (DESYREL) 50 MG tablet, Take 1 tablet by mouth nightly, Disp: 90 tablet, Rfl: 1    loratadine (CLARITIN) 10 MG tablet, Take 1 tablet by mouth daily, Disp: 30 tablet, Rfl: 0    lactulose (CHRONULAC) 10 GM/15ML solution, Take 15 mLs by mouth 4 times daily as needed, Disp: , Rfl:     potassium chloride (KLOR-CON M) 20 MEQ extended release tablet, Take 1 tablet by mouth daily for 7 days (Patient not taking: Reported on 5/4/2021), Disp: 7 tablet, Rfl: 0    calcium carbonate 600 MG TABS tablet, take 1 tablet by mouth once daily, Disp: , Rfl:     D3-50 1.25 MG (73794 UT) CAPS, once a week On Tuesdays, Disp: , Rfl:     hydrOXYzine (VISTARIL) 25 MG capsule, take 1 capsule by mouth three times a day if needed for anxiety, Disp: 90 capsule, Rfl: 2    atorvastatin (LIPITOR) 20 MG tablet, take 1 tablet by mouth at bedtime, Disp: 90 tablet, Rfl: 1    spironolactone (ALDACTONE) 50 MG tablet, take 1 tablet by mouth once daily, Disp: 90 tablet, Rfl: 3    vitamin D (ERGOCALCIFEROL) 1.25 MG (12083 UT) CAPS capsule, take 1 capsule by mouth every week, Disp: 12 capsule, Rfl: 1    nadolol (CORGARD) 20 MG tablet, Take 1 tablet by mouth daily, Disp: 30 tablet, Rfl: 3    rifaximin (XIFAXAN) 550 MG tablet, Take 1 tablet by mouth 3 times daily, Disp: 42 tablet, Rfl: 0    pantoprazole (PROTONIX) 40 MG tablet, Take 1 tablet by mouth every morning (before breakfast), Disp: 30 tablet, Rfl: 3    sildenafil (VIAGRA) 100 MG tablet, Take 1 tablet by mouth as needed for Erectile Dysfunction, Disp: 10 tablet, Rfl: 3      ASSESSMENT PLAN:   I reviewed with him that the dizziness could be related to multiple issues. He was quite adamant he felt it was related to fatigue and that after a short nap while waiting he was actually feeling better. I told him I would recommend evaluation in the emergency department. He did not wish to go to the emergency department today. I told him if he should feel worse this evening he should go to the emergency department. Treatment setup and plan reviewed. Port images/CBCT images reviewed. Appropriate laboratory work was reviewed.  Treatment side effects and toxicities reviewed with the patient, and appropriate management was advised. Will continue radiation treatment as planned, and recommend patient contact us if they have any questions or concerns. Electronically signed by Too Gerard MD on 5/5/2021 at 3:07 PM      Drugs Prescribed:  New Prescriptions    No medications on file       Other Orders Placed:  No orders of the defined types were placed in this encounter.

## 2021-05-06 ENCOUNTER — HOSPITAL ENCOUNTER (OUTPATIENT)
Dept: RADIATION ONCOLOGY | Age: 61
Discharge: HOME | End: 2021-05-06
Attending: RADIOLOGY
Payer: MEDICARE

## 2021-05-06 PROCEDURE — 77014 PR CT GUIDANCE PLACEMENT RAD THERAPY FIELDS: CPT | Performed by: RADIOLOGY

## 2021-05-06 PROCEDURE — 77386 HC NTSTY MODUL RAD TX DLVR CPLX: CPT | Performed by: RADIOLOGY

## 2021-05-07 ENCOUNTER — HOSPITAL ENCOUNTER (OUTPATIENT)
Dept: RADIATION ONCOLOGY | Age: 61
Discharge: HOME | End: 2021-05-07
Attending: RADIOLOGY
Payer: MEDICARE

## 2021-05-07 PROCEDURE — 77014 PR CT GUIDANCE PLACEMENT RAD THERAPY FIELDS: CPT | Performed by: RADIOLOGY

## 2021-05-07 PROCEDURE — 77386 HC NTSTY MODUL RAD TX DLVR CPLX: CPT | Performed by: RADIOLOGY

## 2021-05-10 ENCOUNTER — HOSPITAL ENCOUNTER (OUTPATIENT)
Dept: RADIATION ONCOLOGY | Age: 61
Discharge: HOME | End: 2021-05-10
Attending: RADIOLOGY
Payer: MEDICARE

## 2021-05-10 PROCEDURE — 77386 HC NTSTY MODUL RAD TX DLVR CPLX: CPT | Performed by: RADIOLOGY

## 2021-05-10 PROCEDURE — 77014 PR CT GUIDANCE PLACEMENT RAD THERAPY FIELDS: CPT | Performed by: RADIOLOGY

## 2021-05-11 ENCOUNTER — HOSPITAL ENCOUNTER (OUTPATIENT)
Dept: RADIATION ONCOLOGY | Age: 61
Discharge: HOME | End: 2021-05-11
Attending: RADIOLOGY
Payer: MEDICARE

## 2021-05-11 ENCOUNTER — HOSPITAL ENCOUNTER (OUTPATIENT)
Dept: INFUSION THERAPY | Age: 61
Discharge: HOME | End: 2021-05-11
Payer: MEDICARE

## 2021-05-11 VITALS
SYSTOLIC BLOOD PRESSURE: 139 MMHG | HEART RATE: 89 BPM | TEMPERATURE: 97.9 F | WEIGHT: 223.4 LBS | RESPIRATION RATE: 18 BRPM | BODY MASS INDEX: 32.05 KG/M2 | DIASTOLIC BLOOD PRESSURE: 76 MMHG

## 2021-05-11 DIAGNOSIS — C15.5 MALIGNANT NEOPLASM OF LOWER THIRD OF ESOPHAGUS (HCC): Primary | ICD-10-CM

## 2021-05-11 LAB
ABSOLUTE EOS #: 0 K/UL (ref 0–0.4)
ABSOLUTE IMMATURE GRANULOCYTE: ABNORMAL K/UL (ref 0–0.3)
ABSOLUTE LYMPH #: 0.17 K/UL (ref 1–4.8)
ABSOLUTE MONO #: 0.02 K/UL (ref 0.1–0.8)
ALBUMIN SERPL-MCNC: 3.8 G/DL (ref 3.5–5.2)
ALBUMIN/GLOBULIN RATIO: 1.2 (ref 1–2.5)
ALP BLD-CCNC: 66 U/L (ref 40–129)
ALT SERPL-CCNC: 17 U/L (ref 5–41)
ANION GAP SERPL CALCULATED.3IONS-SCNC: 13 MMOL/L (ref 9–17)
AST SERPL-CCNC: 46 U/L
BASOPHILS # BLD: 0 % (ref 0–2)
BASOPHILS ABSOLUTE: 0 K/UL (ref 0–0.2)
BILIRUB SERPL-MCNC: 2.7 MG/DL (ref 0.3–1.2)
BUN BLDV-MCNC: 5 MG/DL (ref 8–23)
BUN/CREAT BLD: ABNORMAL (ref 9–20)
CALCIUM SERPL-MCNC: 8.9 MG/DL (ref 8.6–10.4)
CHLORIDE BLD-SCNC: 96 MMOL/L (ref 98–107)
CO2: 25 MMOL/L (ref 20–31)
CREAT SERPL-MCNC: <0.4 MG/DL (ref 0.7–1.2)
DIFFERENTIAL TYPE: ABNORMAL
EOSINOPHILS RELATIVE PERCENT: 0 % (ref 1–4)
GFR AFRICAN AMERICAN: ABNORMAL ML/MIN
GFR NON-AFRICAN AMERICAN: ABNORMAL ML/MIN
GFR SERPL CREATININE-BSD FRML MDRD: ABNORMAL ML/MIN/{1.73_M2}
GFR SERPL CREATININE-BSD FRML MDRD: ABNORMAL ML/MIN/{1.73_M2}
GLUCOSE BLD-MCNC: 217 MG/DL (ref 70–99)
HCT VFR BLD CALC: 32.2 % (ref 41–53)
HEMOGLOBIN: 10.5 G/DL (ref 13.5–17.5)
IMMATURE GRANULOCYTES: ABNORMAL %
LYMPHOCYTES # BLD: 8 % (ref 24–44)
MCH RBC QN AUTO: 28.4 PG (ref 26–34)
MCHC RBC AUTO-ENTMCNC: 32.8 G/DL (ref 31–37)
MCV RBC AUTO: 86.5 FL (ref 80–100)
MONOCYTES # BLD: 1 % (ref 1–7)
MORPHOLOGY: NORMAL
NRBC AUTOMATED: ABNORMAL PER 100 WBC
PDW BLD-RTO: 15.9 % (ref 12.5–15.4)
PLATELET # BLD: 51 K/UL (ref 140–450)
PLATELET ESTIMATE: ABNORMAL
PMV BLD AUTO: 9.1 FL (ref 6–12)
POTASSIUM SERPL-SCNC: 4.1 MMOL/L (ref 3.7–5.3)
RBC # BLD: 3.72 M/UL (ref 4.5–5.9)
RBC # BLD: ABNORMAL 10*6/UL
SEG NEUTROPHILS: 91 % (ref 36–66)
SEGMENTED NEUTROPHILS ABSOLUTE COUNT: 1.91 K/UL (ref 1.8–7.7)
SODIUM BLD-SCNC: 134 MMOL/L (ref 135–144)
TOTAL PROTEIN: 7 G/DL (ref 6.4–8.3)
WBC # BLD: 2.1 K/UL (ref 3.5–11)
WBC # BLD: ABNORMAL 10*3/UL

## 2021-05-11 PROCEDURE — 77386 HC NTSTY MODUL RAD TX DLVR CPLX: CPT | Performed by: RADIOLOGY

## 2021-05-11 PROCEDURE — 77014 PR CT GUIDANCE PLACEMENT RAD THERAPY FIELDS: CPT | Performed by: RADIOLOGY

## 2021-05-11 PROCEDURE — 85025 COMPLETE CBC W/AUTO DIFF WBC: CPT

## 2021-05-11 PROCEDURE — 80053 COMPREHEN METABOLIC PANEL: CPT

## 2021-05-11 PROCEDURE — 6360000002 HC RX W HCPCS: Performed by: INTERNAL MEDICINE

## 2021-05-11 PROCEDURE — 2580000003 HC RX 258: Performed by: INTERNAL MEDICINE

## 2021-05-11 PROCEDURE — 36591 DRAW BLOOD OFF VENOUS DEVICE: CPT

## 2021-05-11 RX ORDER — HEPARIN SODIUM (PORCINE) LOCK FLUSH IV SOLN 100 UNIT/ML 100 UNIT/ML
500 SOLUTION INTRAVENOUS PRN
Status: DISCONTINUED | OUTPATIENT
Start: 2021-05-11 | End: 2021-05-12 | Stop reason: HOSPADM

## 2021-05-11 RX ORDER — HEPARIN SODIUM (PORCINE) LOCK FLUSH IV SOLN 100 UNIT/ML 100 UNIT/ML
500 SOLUTION INTRAVENOUS PRN
Status: CANCELLED | OUTPATIENT
Start: 2021-05-18

## 2021-05-11 RX ORDER — SODIUM CHLORIDE 0.9 % (FLUSH) 0.9 %
5-40 SYRINGE (ML) INJECTION PRN
Status: CANCELLED | OUTPATIENT
Start: 2021-05-18

## 2021-05-11 RX ORDER — SODIUM CHLORIDE 0.9 % (FLUSH) 0.9 %
5-40 SYRINGE (ML) INJECTION PRN
Status: DISCONTINUED | OUTPATIENT
Start: 2021-05-11 | End: 2021-05-12 | Stop reason: HOSPADM

## 2021-05-11 RX ADMIN — HEPARIN 500 UNITS: 100 SYRINGE at 09:23

## 2021-05-11 RX ADMIN — SODIUM CHLORIDE, PRESERVATIVE FREE 10 ML: 5 INJECTION INTRAVENOUS at 08:13

## 2021-05-11 RX ADMIN — SODIUM CHLORIDE, PRESERVATIVE FREE 10 ML: 5 INJECTION INTRAVENOUS at 09:23

## 2021-05-11 RX ADMIN — SODIUM CHLORIDE, PRESERVATIVE FREE 10 ML: 5 INJECTION INTRAVENOUS at 08:15

## 2021-05-11 NOTE — PROGRESS NOTES
Patienth here for C2D1 Taxol Carbo. Labs drawn from port and reviewed. Platelets 51 bilirubin 2.7  Dr. Yany Mena updated and ordered to hold treatment for 1 week. He  was discharged home in stable condition. He is due to return 5/14 for MD and 5/18 for C2D1 taxol carbo.

## 2021-05-12 ENCOUNTER — HOSPITAL ENCOUNTER (OUTPATIENT)
Dept: RADIATION ONCOLOGY | Age: 61
Discharge: HOME | End: 2021-05-12
Attending: RADIOLOGY
Payer: MEDICARE

## 2021-05-12 VITALS
RESPIRATION RATE: 18 BRPM | WEIGHT: 227 LBS | BODY MASS INDEX: 32.57 KG/M2 | OXYGEN SATURATION: 95 % | DIASTOLIC BLOOD PRESSURE: 101 MMHG | TEMPERATURE: 98.8 F | HEART RATE: 78 BPM | SYSTOLIC BLOOD PRESSURE: 154 MMHG

## 2021-05-12 DIAGNOSIS — C15.5 MALIGNANT NEOPLASM OF LOWER THIRD OF ESOPHAGUS (HCC): Primary | ICD-10-CM

## 2021-05-12 PROCEDURE — 77336 RADIATION PHYSICS CONSULT: CPT | Performed by: RADIOLOGY

## 2021-05-12 PROCEDURE — 77014 PR CT GUIDANCE PLACEMENT RAD THERAPY FIELDS: CPT | Performed by: RADIOLOGY

## 2021-05-12 PROCEDURE — 77386 HC NTSTY MODUL RAD TX DLVR CPLX: CPT | Performed by: RADIOLOGY

## 2021-05-12 NOTE — PROGRESS NOTES
03/31/2021 12.5 (L) 13.5 - 17.5 g/dL Final     Platelet Count   Date Value Ref Range Status   04/19/2012 308 140 - 450 k/uL Final     Platelets   Date Value Ref Range Status   05/11/2021 51 (L) 140 - 450 k/uL Final   05/04/2021 112 (L) 140 - 450 k/uL Final   04/19/2021 147 (L) 150 - 450 k/uL Final     CREATININE   Date Value Ref Range Status   05/11/2021 <0.40 (L) 0.70 - 1.20 mg/dL Final     Comment:     ICTERIC SPECIMEN   05/04/2021 0.47 (L) 0.70 - 1.20 mg/dL Final   04/05/2021 0.55 (L) 0.70 - 1.20 mg/dL Final     No results found for: , CEA  PSA   Date Value Ref Range Status   03/23/2017 0.44 <4.1 ug/L Final     Comment:     The Roche \"ECLIA\" assay is used. Results obtained with different assay methods   cannot be used interchangeably. Performed at [address], [address] [place] [address] [phone]     04/19/2012 0.54 0 - 4 ug/L Final     Comment:     Performed at [address], [place] [address] [phone]       MEDICATIONS:    Current Outpatient Medications:     Magic Mouthwash (MIRACLE MOUTHWASH), Swish and spit 5 mLs 4 times daily as needed for Irritation, Disp: , Rfl:     lidocaine-prilocaine (EMLA) 2.5-2.5 % cream, Apply topically 45-60 mins prior to needle poke daily PRN, Disp: 1 Tube, Rfl: 2    prochlorperazine (COMPAZINE) 10 MG tablet, Take 1 tablet by mouth every 6 hours as needed (nausea vomiting), Disp: 120 tablet, Rfl: 3    ondansetron (ZOFRAN-ODT) 8 MG TBDP disintegrating tablet, Place 1 tablet under the tongue 3 times daily as needed for Nausea or Vomiting, Disp: 90 tablet, Rfl: 2    dexamethasone (DECADRON) 4 MG tablet, Take 5 tabs (20 mg) 12 and 6 hours before scheduled PACLItaxel (Taxol) infusion. , Disp: 30 tablet, Rfl: 2    traZODone (DESYREL) 50 MG tablet, Take 1 tablet by mouth nightly, Disp: 90 tablet, Rfl: 1    lactulose (CHRONULAC) 10 GM/15ML solution, Take 15 mLs by mouth 4 times daily as needed, Disp: , Rfl:     potassium chloride (KLOR-CON M) 20 MEQ extended release tablet, Take 1 tablet by mouth daily for 7 days (Patient not taking: Reported on 5/4/2021), Disp: 7 tablet, Rfl: 0    calcium carbonate 600 MG TABS tablet, take 1 tablet by mouth once daily, Disp: , Rfl:     D3-50 1.25 MG (74272 UT) CAPS, once a week On Tuesdays, Disp: , Rfl:     hydrOXYzine (VISTARIL) 25 MG capsule, take 1 capsule by mouth three times a day if needed for anxiety, Disp: 90 capsule, Rfl: 2    atorvastatin (LIPITOR) 20 MG tablet, take 1 tablet by mouth at bedtime, Disp: 90 tablet, Rfl: 1    spironolactone (ALDACTONE) 50 MG tablet, take 1 tablet by mouth once daily, Disp: 90 tablet, Rfl: 3    vitamin D (ERGOCALCIFEROL) 1.25 MG (53372 UT) CAPS capsule, take 1 capsule by mouth every week, Disp: 12 capsule, Rfl: 1    nadolol (CORGARD) 20 MG tablet, Take 1 tablet by mouth daily, Disp: 30 tablet, Rfl: 3    rifaximin (XIFAXAN) 550 MG tablet, Take 1 tablet by mouth 3 times daily, Disp: 42 tablet, Rfl: 0    pantoprazole (PROTONIX) 40 MG tablet, Take 1 tablet by mouth every morning (before breakfast), Disp: 30 tablet, Rfl: 3    sildenafil (VIAGRA) 100 MG tablet, Take 1 tablet by mouth as needed for Erectile Dysfunction, Disp: 10 tablet, Rfl: 3      ASSESSMENT PLAN:   He has some esophagitis but continues to eat and his weight is up. He did not wish to begin any medications. He has expected fatigue    Treatment setup and plan reviewed. Port images/CBCT images reviewed. Appropriate laboratory work was reviewed. Treatment side effects and toxicities reviewed with the patient, and appropriate management was advised. Will continue radiation treatment as planned, and recommend patient contact us if they have any questions or concerns.     Electronically signed by Jonas Scott MD on 5/12/2021 at 3:05 PM      Drugs Prescribed:  New Prescriptions    No medications on file       Other Orders Placed:  No orders of the defined types were placed in this encounter.

## 2021-05-12 NOTE — PROGRESS NOTES
Uyen Rubyrset  5/12/2021  Wt Readings from Last 3 Encounters:   05/12/21 227 lb (103 kg)   05/11/21 223 lb 6.4 oz (101.3 kg)   05/05/21 224 lb (101.6 kg)     Body mass index is 32.57 kg/m². Treatment Area:esophageal     Patient was seen today for weekly visit. Comfort Alteration  Fatigue: None    Ventilation Alterations  Cough: Yes  Hemoptysis: No  Mucus Color: white/clear  Dyspnea: No      Nutritional Alteration  Anorexia: No  Nausea: No   Vomiting: No     Mucous Membrane Alteration  Voice Changes/ Stridor/Larynx: no  Pharynx & Esophagus: pain with swallowing     Elimination Alterations  Constipation: no  Diarrhea:  no    Skin Alteration   Sensation:intact     Radiation Dermatitis:  Intact [x]     Erythema  []     Discoloration  []     Rash []     Dry desquamation  []     Moist desquamation []       Emotional  Coping: effective      Injury, potential bleeding or infection: oral and skin care reviewed     Lab Results   Component Value Date    WBC 2.1 (L) 05/11/2021    PLT 51 (L) 05/11/2021         BP (!) 154/101   Pulse 78   Temp 98.8 °F (37.1 °C)   Resp 18   Wt 227 lb (103 kg)   SpO2 95%   BMI 32.57 kg/m²   Patient Currently in Pain: Denies               Assessment/Plan: Patient was seen today for weekly visit. Dr Rupali Hernandez notified.  Script for MMW called into pt's pharmacy for throat pains      Jessica Yu

## 2021-05-13 ENCOUNTER — HOSPITAL ENCOUNTER (OUTPATIENT)
Dept: RADIATION ONCOLOGY | Age: 61
Discharge: HOME | End: 2021-05-13
Attending: RADIOLOGY
Payer: MEDICARE

## 2021-05-13 ENCOUNTER — TELEPHONE (OUTPATIENT)
Dept: ONCOLOGY | Age: 61
End: 2021-05-13

## 2021-05-13 PROCEDURE — 77386 HC NTSTY MODUL RAD TX DLVR CPLX: CPT | Performed by: RADIOLOGY

## 2021-05-13 PROCEDURE — 77014 PR CT GUIDANCE PLACEMENT RAD THERAPY FIELDS: CPT | Performed by: RADIOLOGY

## 2021-05-13 NOTE — TELEPHONE ENCOUNTER
[facility] Initial Nutrition Assessment    Meggan Aleman is a 63 y.o.  male     Reason for Evaluation: PGSGA score 16    Subjective/Current Data:  Called pt on listed phone number. No answer. Left detailed message with return number. Pt's PGSGA indicated swallowing problems, n/v, consuming less than normal amounts of food. Weights have been stable. Pt with hx Hep C, alcohol abuse and cirrhosis. Pt has [facility] eval at [facility] with Dr Nestor Edwards. If sx not possible, MD note indicates concurrent chemoRT will be planned.      Past Medical History:  Past Medical History:   Diagnosis Date    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 [facility]     Alcohol abuse     6-12 beers a day; quit drinking July 2016    Arthritis     Back pain, chronic     dr. Danell Felty, orthopedic, every 3-4 months, gets steroid injection    Lezama esophagus     BPH (benign prostatic hypertrophy)     Cholelithiasis     Cirrhosis ([facility]     COVID-19 12/2020    pt reports he tested + while at [facility] DDD (degenerative disc disease), lumbar     Esophageal varices ([facility]     Fatty liver     GERD (gastroesophageal reflux disease)     Hep C w/o coma, chronic ([facility]     History of blood transfusion     History of colon polyps 2016    [place] hearing[place]     Hyperlipidemia     Hypertension     Stomach ulcer     hx of    Tobacco abuse     Tubular adenoma of colon 2016, 2018    Wears glasses      Past Surgical History:   Procedure Laterality Date    BUNIONECTOMY      twice on right side    BUNIONECTOMY Left     CARPAL TUNNEL RELEASE Right     COLONOSCOPY      at age 35    COLONOSCOPY  10/05/2016    polyps-pathology tubular adenoma, and abnormal looking mucosa right colon-pathology-tubular adenoma    COLONOSCOPY N/A 3/30/2018    COLONOSCOPY POLYPECTOMY COLD BIOPSY performed by Jerald Bai MD at [address] [facility] [address] [facility]  03/30/2018    Small polyp in the sigmoid colon and excised with biopsy forceps--tubular adenoma    ENDOSCOPY, COLON, DIAGNOSTIC      EGD    IR PORT PLACEMENT EQUAL OR GREATER THAN 5 YEARS  4/19/2021    IR PORT PLACEMENT EQUAL OR GREATER THAN 5 YEARS 4/19/2021 [facility]    KNEE SURGERY Left     cyst removed    NASAL SEPTUM SURGERY      NERVE BLOCK Right 11/23/2020    NERVE BLOCK RIGHT CERVICAL STEROID INJECTION  C3-C6 performed by Charlie Padilla MD at [address] [facility]  01/04/16    steroid injection C7 T1    OTHER SURGICAL HISTORY  11/21/2016    Bilateral Lumbar CACHORRO L4-L5 injections    OTHER SURGICAL HISTORY  12/19/2016    lumbar steroid injection    OTHER SURGICAL HISTORY  09/28/2018    BILATERAL L5 CACHORRO (N/A Back)    OTHER SURGICAL HISTORY Right 11/23/2020    cervical injection    PAIN MANAGEMENT PROCEDURE Left 7/9/2020    EPIDURAL STEROID INJECTION LEFT L4 L5 performed by Charlie Padilla MD at [address] Left 7/20/2020    LEFT L4 L5 EPIDURAL STEROID INJECTION performed by Charlie Padilla MD at [address] [facility] Bilateral 8/17/2020    LUMBAR FACET BILATERAL L2-L5 performed by Charlie Padilla MD at [address] Bilateral 12/7/2020    NERVE BLOCK BILATERAL LUMBAR MEDIAL BRANCH L2-L5 performed by Charlie Padilla MD at [address] AA&/STRD TFRML EPI LUMBAR/SACRAL 1 LEVEL Bilateral 9/6/2018    BILATERAL L5 CACHORRO performed by Charlie Padilla MD at [address] AA&/STRD TFRML EPI LUMBAR/SACRAL 1 LEVEL N/A 9/28/2018    BILATERAL L5 CACHORRO performed by Charlie Padilla MD at [facility] N/CARPAL TUNNEL SURG Right 8/29/2017    CARPAL TUNNEL RELEASE RIGHT performed by Winston Tavera MD at [facility] N/CARPAL TUNNEL SURG Left 10/31/2017    CARPAL TUNNEL RELEASE performed by Winston Tavera MD at [address] 12/29/2020    EGD BIOPSY performed by Toshia Dexter MD at [address] 2/2/2021    EGD BIOPSY and spot

## 2021-05-14 ENCOUNTER — OFFICE VISIT (OUTPATIENT)
Dept: ONCOLOGY | Age: 61
End: 2021-05-14
Payer: MEDICARE

## 2021-05-14 ENCOUNTER — TELEPHONE (OUTPATIENT)
Dept: ONCOLOGY | Age: 61
End: 2021-05-14

## 2021-05-14 ENCOUNTER — HOSPITAL ENCOUNTER (OUTPATIENT)
Age: 61
Discharge: HOME | End: 2021-05-14
Payer: MEDICARE

## 2021-05-14 ENCOUNTER — HOSPITAL ENCOUNTER (OUTPATIENT)
Dept: RADIATION ONCOLOGY | Age: 61
Discharge: HOME | End: 2021-05-14
Attending: RADIOLOGY
Payer: MEDICARE

## 2021-05-14 VITALS
BODY MASS INDEX: 31.84 KG/M2 | TEMPERATURE: 97.9 F | WEIGHT: 221.9 LBS | SYSTOLIC BLOOD PRESSURE: 115 MMHG | RESPIRATION RATE: 16 BRPM | HEART RATE: 64 BPM | DIASTOLIC BLOOD PRESSURE: 66 MMHG

## 2021-05-14 DIAGNOSIS — C15.5 MALIGNANT NEOPLASM OF LOWER THIRD OF ESOPHAGUS (HCC): ICD-10-CM

## 2021-05-14 LAB
A1 LECTIN: NEGATIVE
ABO/RH: NORMAL
ANTIBODY SCREEN: NEGATIVE
ARM BAND NUMBER: NORMAL
EXPIRATION DATE: NORMAL

## 2021-05-14 PROCEDURE — 86901 BLOOD TYPING SEROLOGIC RH(D): CPT

## 2021-05-14 PROCEDURE — 3017F COLORECTAL CA SCREEN DOC REV: CPT | Performed by: INTERNAL MEDICINE

## 2021-05-14 PROCEDURE — 99215 OFFICE O/P EST HI 40 MIN: CPT | Performed by: INTERNAL MEDICINE

## 2021-05-14 PROCEDURE — G8427 DOCREV CUR MEDS BY ELIG CLIN: HCPCS | Performed by: INTERNAL MEDICINE

## 2021-05-14 PROCEDURE — 99211 OFF/OP EST MAY X REQ PHY/QHP: CPT | Performed by: INTERNAL MEDICINE

## 2021-05-14 PROCEDURE — 77014 PR CT GUIDANCE PLACEMENT RAD THERAPY FIELDS: CPT | Performed by: RADIOLOGY

## 2021-05-14 PROCEDURE — 36415 COLL VENOUS BLD VENIPUNCTURE: CPT

## 2021-05-14 PROCEDURE — 77386 HC NTSTY MODUL RAD TX DLVR CPLX: CPT | Performed by: RADIOLOGY

## 2021-05-14 PROCEDURE — 1036F TOBACCO NON-USER: CPT | Performed by: INTERNAL MEDICINE

## 2021-05-14 PROCEDURE — G8417 CALC BMI ABV UP PARAM F/U: HCPCS | Performed by: INTERNAL MEDICINE

## 2021-05-14 PROCEDURE — 86900 BLOOD TYPING SEROLOGIC ABO: CPT

## 2021-05-14 PROCEDURE — 86850 RBC ANTIBODY SCREEN: CPT

## 2021-05-14 NOTE — PROGRESS NOTES
Patient ID: Sebas Medley, [birth year], [id number], 63 y.o. Referred by : Dr Bronson Zamorano  Reason for consultation:   Esophageal cancer T2N0Mx  Stage II   started on concurrent chemoradiation preoperatively on 5/4/21  HISTORY OF PRESENT ILLNESS:    Oncologic History:  Sebas Medley is a 63 y.o. male with a history of hepatitis C, status post treatment, liver cirrhosis, history of alcohol abuse was seen during initial consultation visit for newly diagnosed esophageal cancer. Patient reportedly had \"heavy drinking alcohol in about 2016 however recently in December he had 2 beers and he presented with hematemesis. On 12/29/2020 he had upper endoscopy which showed severe portal hypertension, gastropathy. The biopsy from the GE junction showed invasive adenocarcinoma. Patient had a follow-up EGD on 2/2/2021 which confirmed esophageal adenocarcinoma. Patient had endoscopic ultrasound on 2/12/2021 which showed T2 N0 MX disease with underlying esophageal varices. In the esophagus hypoechoic lesion noted in the lower esophagus penetrating into submucosa and into muscularis propria. No lymphadenopathy noted. Patient was referred to medical oncology for further recommendations. He denies any difficulty swallowing. He does not smoke he has quit smoking in 2016. He has not drank alcohol since December. He has a history of hepatitis C and he has received treatment. He lives by himself. He is accompanied by his ex-wife during today's office visit. INTERVAL HISTORY:  Patient is returning for follow up visit to to discuss further recommendations. His tolerating chemotherapy well. He does have some difficulty swallowing. He denies any significant nausea vomiting, abdominal pain. He denies any bleeding symptoms. He does have low platelets and his chemotherapy was held last week. During this visit patient's allergy, social, medical, surgical history and medications were reviewed and updated.     Past Medical History:   Diagnosis Date    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 ([facility]     Alcohol abuse     6-12 beers a day; quit drinking July 2016    Arthritis     Back pain, chronic     dr. Arletha Pallas, orthopedic, every 3-4 months, gets steroid injection    Lezama esophagus     BPH (benign prostatic hypertrophy)     Cholelithiasis     Cirrhosis ([facility]     COVID-19 12/2020    pt reports he tested + while at [facility] DDD (degenerative disc disease), lumbar     Esophageal varices ([facility]     Fatty liver     GERD (gastroesophageal reflux disease)     Hep C w/o coma, chronic ([facility]     History of blood transfusion     History of colon polyps 2016    [place] (hard of hearing)     Hyperlipidemia     Hypertension     Stomach ulcer     hx of    Tobacco abuse     Tubular adenoma of colon 2016, 2018    Wears glasses        Past Surgical History:   Procedure Laterality Date    BUNIONECTOMY      twice on right side    BUNIONECTOMY Left     CARPAL TUNNEL RELEASE Right     COLONOSCOPY      at age 35    COLONOSCOPY  10/05/2016    polyps-pathology tubular adenoma, and abnormal looking mucosa right colon-pathology-tubular adenoma    COLONOSCOPY N/A 3/30/2018    COLONOSCOPY POLYPECTOMY COLD BIOPSY performed by Marisela Garcia MD at [address]  03/30/2018    Small polyp in the sigmoid colon and excised with biopsy forceps--tubular adenoma    ENDOSCOPY, COLON, DIAGNOSTIC      EGD    IR PORT PLACEMENT EQUAL OR GREATER THAN 5 YEARS  4/19/2021    IR PORT PLACEMENT EQUAL OR GREATER THAN 5 YEARS 4/19/2021 [facility]    KNEE SURGERY Left     cyst removed    NASAL SEPTUM SURGERY      NERVE BLOCK Right 11/23/2020    NERVE BLOCK RIGHT CERVICAL STEROID INJECTION  C3-C6 [facility]ankaj [facility] [address] [facility]  [facility]16    steroid injection C7 T1    OTHER SURGICAL HISTORY  11/21/2016    Bilateral Lumbar CACHORRO L4-L5 injections    OTHER SURGICAL HISTORY  12/19/2016    lumbar steroid injection    OTHER SURGICAL HISTORY  09/28/2018    BILATERAL L5 CACHORRO (N/A Back)    OTHER SURGICAL HISTORY Right 11/23/2020    cervical injection    PAIN MANAGEMENT PROCEDURE Left 7/9/2020    EPIDURAL STEROID INJECTION LEFT L4 L5 performed by Kiley Louie MD at [facility] Left 7/20/2020    LEFT L4 L5 EPIDURAL STEROID INJECTION performed by Kiley Louie MD at [address] [place] [address] Bilateral 8/17/2020    LUMBAR FACET BILATERAL L2-L5 performed by Kiley Louie MD at [address] [place] [address] Bilateral 12/7/2020    NERVE BLOCK BILATERAL LUMBAR MEDIAL BRANCH L2-L5 performed by Kiley Louie MD at [address] AA&/STRD TFRML EPI LUMBAR/SACRAL 1 LEVEL Bilateral 9/6/2018    BILATERAL L5 CACHORRO performed by Kiley Louie MD at [address] AA&/STRD TFRML EPI LUMBAR/SACRAL 1 LEVEL N/A 9/28/2018    BILATERAL L5 CACHORRO performed by Kiley Louie MD at [address] [place] [address] N/CARPAL TUNNEL SURG Right 8/29/2017    CARPAL TUNNEL RELEASE RIGHT performed by Chip Mason MD at [address] [place] [address] N/CARPAL TUNNEL SURG Left 10/31/2017    CARPAL TUNNEL RELEASE performed by Chip Mason MD at [facility] 12/29/2020    EGD BIOPSY performed by Lottie Kline MD at [place] [address] N/A 2/2/2021    EGD BIOPSY and spot marking performed by Krysta Cortez MD at [place] [address] N/A 2/12/2021    ENDOSCOPIC ULTRASOUND, EGD performed by Charanjit Christianson MD at [address] [place] [address]  2/12/2021    EGD DIAGNOSTIC ONLY performed by Charanjit Christianson MD at [address] [place] [address] EXTRACTION       Allergies   Allergen Reactions    Pollen Extract      Runny nose, watery eyes       Current Outpatient Medications   Medication Sig Dispense Refill    Magic Mouthwash (MIRACLE MOUTHWASH) Swish and spit 5 mLs 4 times daily as needed for Irritation      lidocaine-prilocaine (EMLA) 2.5-2.5 % cream Apply topically 45-60 mins prior to needle poke daily PRN 1 Tube 2    prochlorperazine (COMPAZINE) 10 MG tablet Take 1 tablet by mouth every 6 hours as needed (nausea vomiting) 120 tablet 3    ondansetron (ZOFRAN-ODT) 8 MG TBDP disintegrating tablet Place 1 tablet under the tongue 3 times daily as needed for Nausea or Vomiting 90 tablet 2    dexamethasone (DECADRON) 4 MG tablet Take 5 tabs (20 mg) 12 and 6 hours before scheduled PACLItaxel (Taxol) infusion. 30 tablet 2    traZODone (DESYREL) 50 MG tablet Take 1 tablet by mouth nightly 90 tablet 1    lactulose (CHRONULAC) 10 GM/15ML solution Take 15 mLs by mouth 4 times daily as needed      potassium chloride (KLOR-CON M) 20 MEQ extended release tablet Take 1 tablet by mouth daily for 7 days (Patient not taking: Reported on 5/4/2021) 7 tablet 0    calcium carbonate 600 MG TABS tablet take 1 tablet by mouth once daily      D3-50 1.25 MG (45953 UT) CAPS once a week On Tuesdays      hydrOXYzine (VISTARIL) 25 MG capsule take 1 capsule by mouth three times a day if needed for anxiety 90 capsule 2    atorvastatin (LIPITOR) 20 MG tablet take 1 tablet by mouth at bedtime 90 tablet 1    spironolactone (ALDACTONE) 50 MG tablet take 1 tablet by mouth once daily 90 tablet 3    vitamin D (ERGOCALCIFEROL) 1.25 MG (62255 UT) CAPS capsule take 1 capsule by mouth every week 12 capsule 1    nadolol (CORGARD) 20 MG tablet Take 1 tablet by mouth daily 30 tablet 3    rifaximin (XIFAXAN) 550 MG tablet Take 1 tablet by mouth 3 times daily 42 tablet 0    pantoprazole (PROTONIX) 40 MG tablet Take 1 tablet by mouth every morning (before breakfast) 30 tablet 3    sildenafil (VIAGRA) 100 MG tablet Take 1 tablet by mouth as needed for Erectile Dysfunction 10 tablet 3     No current facility-administered medications for this visit.         Social History     Socioeconomic History    Marital status: Single     Spouse name: Not on file    Number of children: Not on file    Years of education: Not on file    Highest education level: Not on file   Occupational History    Not on file   Social Needs    Financial resource strain: Not hard at all    Food insecurity     Worry: Never true     Inability: Never true   [employer] needs     Medical: No     Non-medical: No   Tobacco Use    Smoking status: Former Smoker     Packs/day: 1.00     Years: 45.00     Pack years: 45.00     Quit date: 2017     Years since quittin.3    Smokeless tobacco: Never Used   Substance and Sexual Activity    Alcohol use: No     Comment:     Drug use: No     Frequency: 1.0 times per week     Comment: cocaine,  stopped spring 2016    Sexual activity: Yes     Partners: Female   Lifestyle    Physical activity     Days per week: Not on file     Minutes per session: Not on file    Stress: Not on file   Relationships    Social connections     Talks on phone: Not on file     Gets together: Not on file     Attends Confucianism service: Not on file     Active member of club or organization: Not on file     Attends meetings of clubs or organizations: Not on file     Relationship status: Not on file    Intimate partner violence     Fear of current or ex partner: Not on file     Emotionally abused: Not on file     Physically abused: Not on file     Forced sexual activity: Not on file   Other Topics Concern    Not on file   Social History Narrative     in the past, retired       Family History   Problem Relation Age of Onset    Cancer Mother         pancreatic    Cancer Father         bone    Diabetes Sister     Asthma Brother         REVIEW OF SYSTEM:     Constitutional: No fever or chills.  No night sweats, no weight loss   Eyes: No eye discharge, double vision, or eye pain   HEENT: negative for sore mouth, sore throat, hoarseness and voice change   Respiratory: negative for cough , sputum, dyspnea, wheezing, hemoptysis, chest pain   Cardiovascular: negative for chest pain, dyspnea, palpitations, orthopnea, PND   Gastrointestinal: negative for nausea, vomiting, diarrhea, constipation, abdominal pain, Dysphagia, hematemesis and hematochezia   Genitourinary: negative for frequency, dysuria, nocturia, urinary incontinence, and hematuria   Integument: negative for rash, skin lesions, bruises. Hematologic/Lymphatic: negative for easy bruising, bleeding, lymphadenopathy, petechiae and swelling/edema   Endocrine: negative for heat or cold intolerance, tremor, weight changes, change in bowel habits and hair loss   Musculoskeletal: negative for myalgias, arthralgias, pain, joint swelling,and bone pain   Neurological: negative for headaches, dizziness, seizures, weakness, numbness       OBJECTIVE:         There were no vitals filed for this visit.     PHYSICAL EXAM:   General appearance - well appearing, no in pain or distress   Mental status - alert and cooperative   Eyes - pupils equal and reactive, extraocular eye movements intact   Ears - bilateral TM's and external ear canals normal   Mouth - mucous membranes moist, pharynx normal without lesions   Neck - supple, no significant adenopathy   Lymphatics - no palpable lymphadenopathy, no hepatosplenomegaly   Chest - clear to auscultation, no wheezes, rales or rhonchi, symmetric air entry   Heart - normal rate, regular rhythm, normal S1, S2, no murmurs, rubs, clicks or gallops   Abdomen - soft, nontender, nondistended, no masses or organomegaly   Neurological - alert, oriented, normal speech, no focal findings or movement disorder noted   Musculoskeletal - no joint tenderness, deformity or swelling   Extremities - peripheral pulses normal, no pedal edema, no clubbing or cyanosis   Skin - normal coloration and turgor, no rashes, no suspicious skin lesions noted   LABORATORY DATA:     Lab Results   Component Value Date    WBC 2.1 (L) 05/11/2021    HGB 10.5 (L) 05/11/2021    HCT 32.2 (L) 05/11/2021 MCV 86.5 05/11/2021    PLT 51 (L) 05/11/2021    LYMPHOPCT 8 (L) 05/11/2021    RBC 3.72 (L) 05/11/2021    MCH 28.4 05/11/2021    MCHC 32.8 05/11/2021    RDW 15.9 (H) 05/11/2021    MONOPCT 1 05/11/2021    BASOPCT 0 05/11/2021    NEUTROABS 1.91 05/11/2021    LYMPHSABS 0.17 (L) 05/11/2021    MONOSABS 0.02 (L) 05/11/2021    EOSABS 0.00 05/11/2021    BASOSABS 0.00 05/11/2021       Chemistry        Component Value Date/Time     (L) 05/11/2021 0810    K 4.1 05/11/2021 0810    CL 96 (L) 05/11/2021 0810    CO2 25 05/11/2021 0810    BUN 5 (L) 05/11/2021 0810    CREATININE <0.40 (L) 05/11/2021 0810        Component Value Date/Time    CALCIUM 8.9 05/11/2021 0810    ALKPHOS 66 05/11/2021 0810    AST 46 (H) 05/11/2021 0810    ALT 17 05/11/2021 0810    BILITOT 2.70 (H) 05/11/2021 0810        PATHOLOGY DATA:   Surgical Pathology Report  Surgical Pathology  Collected: 12/29/20 1334   Lab status: Final   Resulting lab: [facility]   Value: [id number]   [address], [id number] [place] [address]   [phone]   Fax: [phone]   SURGICAL PATHOLOGY REPORT     Patient Name: Cami Barone   MR#: [id number]   Specimen #[id number]         Final Diagnosis   GASTROESOPHAGEAL JUNCTION, BIOPSY:          INVASIVE ADENOCARCINOMA    Final Diagnosis   ESOPHAGUS, LESION AT 34 CM, BIOPSY:          INVASIVE ADENOCARCINOMA ARISING IN TUBULAR ADENOMA WITH HIGH   GRADE DYSPLASIA, ASSOCIATED WITH FOCAL INTESTINAL METAPLASIA (SEE   COMMENT)     Diagnosis Comment   The malignancy diagnosis is confirmed by review of a second   pathologist. Rah Vera result of HER2 IHC with reflex to [address] for   gastroesophageal adenocarcinoma will be issued in an addendum.    ADDENDUM (SCM)     Date Ordered:     2/16/2021     Status: Signed Out        Date Complete:     2/16/2021     By: Pooja Varghese M.D.        Date Reported:     2/16/2021       ADDENDUM COMMENT   This addendum is issued in order to incorporate the result of HER2 by   FISH, performed at [facility] ([id number], 02/16/2021).       \"RESULTS:  INDETERMINATE     Interpretation:     Average HER2 signals/nucleus:  4.4   Average SUNG 17 signals/nucleus:  3.4   HER2/SUNG 17 signal ratio:  1.3   Number of Observers:  2     Even after analysis of additional cells and review of slides by a   pathologist, FISH results show a HER2/SUNG 17 ratio < 2.0 and an   average of 4-6 HER2 signals per nucleus and 3 or more SUNG 17 signals   per nucleus.  The results are INDETERMINATE.       In this situation, the 2016 CAP/ASCP/ASCO guidelines recommend   selecting a different tumor block for HER2 testing, if available. \"       Of note, there is only one block available for testing of invasive   esophageal adenocarcinoma tumor cells.  It was already used for HER2   IHC and HER2 FISH testing with the results issued in the addendums.     IMAGING DATA:    Reviewed  ASSESSMENT:    Mirza Roman is a 63 y.o. male with history of hepatitis C, liver cirrhosis, history of alcohol abuse, with esophageal cancer. In total, I spent greater than 80 minutes in face-to-face consultation with the patient and the majority of this time was spent in education, counseling, and coordination of care. During our encounter, I personally reviewed the above history and evaluation, including radiology and pathology data, in detail  I reviewed the NCCN guidelines and goals of care. Clinically appears to have T2 N0 M0  Stag II, disease. Started on preoperative  concurrent chemoradiation   I reviewed the risk benefits and side effects of chemoRT. During today's visit, the patient and the family had a number of reasonable questions which were answered to their satisfaction. They verbalized understanding of the information provided and they agreed to proceed as outlined above.      PLAN:   I reviewed the treatment plan with patient and family  He tolerated first cycle of chemotherapy well however he does have thrombocytopenia possibly from his liver disease and subsequently chemotherapy decreasing it further  I will decrease the dose of chemotherapy and plan to continue chemotherapy  Return to clinic in 2 weeks or earlier if needed    Garrison Nugent MD  Hematologist/Medical Oncologist      This note is created with the assistance of a speech recognition program.  While intending to generate a document that actually reflects the content of the visit, the document can still have some errors including those of syntax and sound a like substitutions which may escape proof reading. It such instances, actual meaning can be extrapolated by contextual diversion.

## 2021-05-14 NOTE — TELEPHONE ENCOUNTER
AVS from 5/14/21     One unit platelet transfuion on Monday' Chemo on Tuesday  Dose reduction in chemo  RTC 2 weeks    Transfusion scheduled for Monday    Notified pharmacy of dosage change, confirmed with precert tx does not need to be re ran    PT will be seen for RV during tx 6/1/21    PT was given AVS and an appt schedule    Electronically signed by Daily Weeks on 5/14/2021 at 1:27 PM

## 2021-05-17 ENCOUNTER — HOSPITAL ENCOUNTER (OUTPATIENT)
Dept: INFUSION THERAPY | Age: 61
Discharge: HOME | End: 2021-05-17
Payer: MEDICARE

## 2021-05-17 ENCOUNTER — APPOINTMENT (OUTPATIENT)
Dept: RADIATION ONCOLOGY | Age: 61
End: 2021-05-17
Attending: RADIOLOGY
Payer: MEDICARE

## 2021-05-17 VITALS
RESPIRATION RATE: 18 BRPM | DIASTOLIC BLOOD PRESSURE: 81 MMHG | SYSTOLIC BLOOD PRESSURE: 148 MMHG | HEART RATE: 87 BPM | TEMPERATURE: 98.5 F

## 2021-05-17 DIAGNOSIS — C15.5 MALIGNANT NEOPLASM OF LOWER THIRD OF ESOPHAGUS (HCC): Primary | ICD-10-CM

## 2021-05-17 PROCEDURE — P9037 PLATE PHERES LEUKOREDU IRRAD: HCPCS

## 2021-05-17 PROCEDURE — 6360000002 HC RX W HCPCS: Performed by: INTERNAL MEDICINE

## 2021-05-17 PROCEDURE — 2580000003 HC RX 258: Performed by: INTERNAL MEDICINE

## 2021-05-17 PROCEDURE — 36430 TRANSFUSION BLD/BLD COMPNT: CPT

## 2021-05-17 RX ORDER — HEPARIN SODIUM (PORCINE) LOCK FLUSH IV SOLN 100 UNIT/ML 100 UNIT/ML
500 SOLUTION INTRAVENOUS PRN
Status: DISCONTINUED | OUTPATIENT
Start: 2021-05-17 | End: 2021-05-18 | Stop reason: HOSPADM

## 2021-05-17 RX ORDER — SODIUM CHLORIDE 9 MG/ML
25 INJECTION, SOLUTION INTRAVENOUS PRN
Status: CANCELLED | OUTPATIENT
Start: 2021-05-17

## 2021-05-17 RX ORDER — SODIUM CHLORIDE 0.9 % (FLUSH) 0.9 %
5-40 SYRINGE (ML) INJECTION PRN
Status: DISCONTINUED | OUTPATIENT
Start: 2021-05-17 | End: 2021-05-18 | Stop reason: HOSPADM

## 2021-05-17 RX ORDER — HEPARIN SODIUM (PORCINE) LOCK FLUSH IV SOLN 100 UNIT/ML 100 UNIT/ML
500 SOLUTION INTRAVENOUS PRN
Status: CANCELLED | OUTPATIENT
Start: 2021-05-17

## 2021-05-17 RX ORDER — SODIUM CHLORIDE 0.9 % (FLUSH) 0.9 %
5-40 SYRINGE (ML) INJECTION PRN
Status: CANCELLED | OUTPATIENT
Start: 2021-05-17

## 2021-05-17 RX ORDER — SODIUM CHLORIDE 9 MG/ML
25 INJECTION, SOLUTION INTRAVENOUS PRN
Status: DISCONTINUED | OUTPATIENT
Start: 2021-05-17 | End: 2021-05-18 | Stop reason: HOSPADM

## 2021-05-17 RX ORDER — SODIUM CHLORIDE 9 MG/ML
INJECTION, SOLUTION INTRAVENOUS CONTINUOUS
Status: DISCONTINUED | OUTPATIENT
Start: 2021-05-17 | End: 2021-05-18 | Stop reason: HOSPADM

## 2021-05-17 RX ADMIN — HEPARIN 500 UNITS: 100 SYRINGE at 13:53

## 2021-05-17 RX ADMIN — SODIUM CHLORIDE, PRESERVATIVE FREE 10 ML: 5 INJECTION INTRAVENOUS at 13:53

## 2021-05-17 RX ADMIN — SODIUM CHLORIDE, PRESERVATIVE FREE 10 ML: 5 INJECTION INTRAVENOUS at 13:21

## 2021-05-17 RX ADMIN — SODIUM CHLORIDE 250 ML: 9 INJECTION, SOLUTION INTRAVENOUS at 13:22

## 2021-05-17 NOTE — PROGRESS NOTES
Patient arrived for 1 unit PLT  Consent signed   Tolerated w/o incident   Return 5/18 for Dr and possible tx   Frankie Siddiqui, RN RN

## 2021-05-18 ENCOUNTER — HOSPITAL ENCOUNTER (OUTPATIENT)
Dept: RADIATION ONCOLOGY | Age: 61
Discharge: HOME | End: 2021-05-18
Attending: RADIOLOGY
Payer: MEDICARE

## 2021-05-18 ENCOUNTER — HOSPITAL ENCOUNTER (OUTPATIENT)
Dept: INFUSION THERAPY | Age: 61
Discharge: HOME | End: 2021-05-18
Payer: MEDICARE

## 2021-05-18 VITALS
BODY MASS INDEX: 31.55 KG/M2 | TEMPERATURE: 97.2 F | WEIGHT: 220.4 LBS | DIASTOLIC BLOOD PRESSURE: 69 MMHG | RESPIRATION RATE: 16 BRPM | HEART RATE: 77 BPM | HEIGHT: 70 IN | SYSTOLIC BLOOD PRESSURE: 114 MMHG

## 2021-05-18 DIAGNOSIS — C15.5 MALIGNANT NEOPLASM OF LOWER THIRD OF ESOPHAGUS (HCC): Primary | ICD-10-CM

## 2021-05-18 LAB
ABSOLUTE EOS #: 0.02 K/UL (ref 0–0.4)
ABSOLUTE IMMATURE GRANULOCYTE: ABNORMAL K/UL (ref 0–0.3)
ABSOLUTE LYMPH #: 0.51 K/UL (ref 1–4.8)
ABSOLUTE MONO #: 0.78 K/UL (ref 0.1–0.8)
ALBUMIN SERPL-MCNC: 3.7 G/DL (ref 3.5–5.2)
ALBUMIN/GLOBULIN RATIO: 1.5 (ref 1–2.5)
ALP BLD-CCNC: 74 U/L (ref 40–129)
ALT SERPL-CCNC: 18 U/L (ref 5–41)
ANION GAP SERPL CALCULATED.3IONS-SCNC: 11 MMOL/L (ref 9–17)
AST SERPL-CCNC: 41 U/L
BASOPHILS # BLD: 1 % (ref 0–2)
BASOPHILS ABSOLUTE: 0.02 K/UL (ref 0–0.2)
BILIRUB SERPL-MCNC: 1.48 MG/DL (ref 0.3–1.2)
BLD PROD TYP BPU: NORMAL
BUN BLDV-MCNC: 4 MG/DL (ref 8–23)
BUN/CREAT BLD: ABNORMAL (ref 9–20)
CALCIUM SERPL-MCNC: 8.9 MG/DL (ref 8.6–10.4)
CHLORIDE BLD-SCNC: 103 MMOL/L (ref 98–107)
CO2: 23 MMOL/L (ref 20–31)
CREAT SERPL-MCNC: <0.4 MG/DL (ref 0.7–1.2)
DIFFERENTIAL TYPE: ABNORMAL
DISPENSE STATUS BLOOD BANK: NORMAL
EOSINOPHILS RELATIVE PERCENT: 1 % (ref 1–4)
GFR AFRICAN AMERICAN: ABNORMAL ML/MIN
GFR NON-AFRICAN AMERICAN: ABNORMAL ML/MIN
GFR SERPL CREATININE-BSD FRML MDRD: ABNORMAL ML/MIN/{1.73_M2}
GFR SERPL CREATININE-BSD FRML MDRD: ABNORMAL ML/MIN/{1.73_M2}
GLUCOSE BLD-MCNC: 109 MG/DL (ref 70–99)
HCT VFR BLD CALC: 35.6 % (ref 41–53)
HEMOGLOBIN: 11.4 G/DL (ref 13.5–17.5)
IMMATURE GRANULOCYTES: ABNORMAL %
LYMPHOCYTES # BLD: 22 % (ref 24–44)
MCH RBC QN AUTO: 28.7 PG (ref 26–34)
MCHC RBC AUTO-ENTMCNC: 32 G/DL (ref 31–37)
MCV RBC AUTO: 89.7 FL (ref 80–100)
MONOCYTES # BLD: 34 % (ref 1–7)
MORPHOLOGY: ABNORMAL
NRBC AUTOMATED: ABNORMAL PER 100 WBC
PDW BLD-RTO: 17.8 % (ref 12.5–15.4)
PLATELET # BLD: 89 K/UL (ref 140–450)
PLATELET ESTIMATE: ABNORMAL
PMV BLD AUTO: 7.4 FL (ref 6–12)
POTASSIUM SERPL-SCNC: 4.2 MMOL/L (ref 3.7–5.3)
RBC # BLD: 3.97 M/UL (ref 4.5–5.9)
RBC # BLD: ABNORMAL 10*6/UL
SEG NEUTROPHILS: 42 % (ref 36–66)
SEGMENTED NEUTROPHILS ABSOLUTE COUNT: 0.97 K/UL (ref 1.8–7.7)
SODIUM BLD-SCNC: 137 MMOL/L (ref 135–144)
TOTAL PROTEIN: 6.2 G/DL (ref 6.4–8.3)
TRANSFUSION STATUS: NORMAL
UNIT DIVISION: 0
UNIT NUMBER: NORMAL
WBC # BLD: 2.3 K/UL (ref 3.5–11)
WBC # BLD: ABNORMAL 10*3/UL

## 2021-05-18 PROCEDURE — 36591 DRAW BLOOD OFF VENOUS DEVICE: CPT

## 2021-05-18 PROCEDURE — 77386 HC NTSTY MODUL RAD TX DLVR CPLX: CPT | Performed by: RADIOLOGY

## 2021-05-18 PROCEDURE — 6360000002 HC RX W HCPCS: Performed by: INTERNAL MEDICINE

## 2021-05-18 PROCEDURE — 96417 CHEMO IV INFUS EACH ADDL SEQ: CPT

## 2021-05-18 PROCEDURE — 77427 RADIATION TX MANAGEMENT X5: CPT | Performed by: RADIOLOGY

## 2021-05-18 PROCEDURE — 96413 CHEMO IV INFUSION 1 HR: CPT

## 2021-05-18 PROCEDURE — 2500000003 HC RX 250 WO HCPCS: Performed by: INTERNAL MEDICINE

## 2021-05-18 PROCEDURE — 77014 PR CT GUIDANCE PLACEMENT RAD THERAPY FIELDS: CPT | Performed by: RADIOLOGY

## 2021-05-18 PROCEDURE — 96375 TX/PRO/DX INJ NEW DRUG ADDON: CPT

## 2021-05-18 PROCEDURE — 2580000003 HC RX 258: Performed by: INTERNAL MEDICINE

## 2021-05-18 PROCEDURE — 80053 COMPREHEN METABOLIC PANEL: CPT

## 2021-05-18 PROCEDURE — 85025 COMPLETE CBC W/AUTO DIFF WBC: CPT

## 2021-05-18 RX ORDER — DEXAMETHASONE SODIUM PHOSPHATE 10 MG/ML
10 INJECTION INTRAMUSCULAR; INTRAVENOUS ONCE
Status: COMPLETED | OUTPATIENT
Start: 2021-05-18 | End: 2021-05-18

## 2021-05-18 RX ORDER — HEPARIN SODIUM (PORCINE) LOCK FLUSH IV SOLN 100 UNIT/ML 100 UNIT/ML
500 SOLUTION INTRAVENOUS PRN
Status: DISCONTINUED | OUTPATIENT
Start: 2021-05-18 | End: 2021-05-19 | Stop reason: HOSPADM

## 2021-05-18 RX ORDER — SODIUM CHLORIDE 0.9 % (FLUSH) 0.9 %
5-40 SYRINGE (ML) INJECTION PRN
Status: DISCONTINUED | OUTPATIENT
Start: 2021-05-18 | End: 2021-05-19 | Stop reason: HOSPADM

## 2021-05-18 RX ORDER — SODIUM CHLORIDE 9 MG/ML
20 INJECTION, SOLUTION INTRAVENOUS ONCE
Status: COMPLETED | OUTPATIENT
Start: 2021-05-18 | End: 2021-05-18

## 2021-05-18 RX ORDER — DIPHENHYDRAMINE HYDROCHLORIDE 50 MG/ML
50 INJECTION INTRAMUSCULAR; INTRAVENOUS ONCE
Status: COMPLETED | OUTPATIENT
Start: 2021-05-18 | End: 2021-05-18

## 2021-05-18 RX ORDER — PALONOSETRON 0.05 MG/ML
0.25 INJECTION, SOLUTION INTRAVENOUS ONCE
Status: COMPLETED | OUTPATIENT
Start: 2021-05-18 | End: 2021-05-18

## 2021-05-18 RX ADMIN — FAMOTIDINE 20 MG: 10 INJECTION, SOLUTION INTRAVENOUS at 11:48

## 2021-05-18 RX ADMIN — PALONOSETRON 0.25 MG: 0.05 INJECTION, SOLUTION INTRAVENOUS at 11:32

## 2021-05-18 RX ADMIN — PACLITAXEL 78 MG: 6 INJECTION, SOLUTION, CONCENTRATE INTRAVENOUS at 12:07

## 2021-05-18 RX ADMIN — SODIUM CHLORIDE, PRESERVATIVE FREE 10 ML: 5 INJECTION INTRAVENOUS at 14:03

## 2021-05-18 RX ADMIN — SODIUM CHLORIDE 20 ML/HR: 9 INJECTION, SOLUTION INTRAVENOUS at 11:31

## 2021-05-18 RX ADMIN — HEPARIN 500 UNITS: 100 SYRINGE at 14:03

## 2021-05-18 RX ADMIN — DEXAMETHASONE SODIUM PHOSPHATE 10 MG: 10 INJECTION INTRAMUSCULAR; INTRAVENOUS at 11:50

## 2021-05-18 RX ADMIN — DIPHENHYDRAMINE HYDROCHLORIDE 50 MG: 50 INJECTION, SOLUTION INTRAMUSCULAR; INTRAVENOUS at 11:33

## 2021-05-18 RX ADMIN — CARBOPLATIN 225 MG: 10 INJECTION INTRAVENOUS at 13:16

## 2021-05-18 RX ADMIN — SODIUM CHLORIDE, PRESERVATIVE FREE 10 ML: 5 INJECTION INTRAVENOUS at 09:20

## 2021-05-18 NOTE — PROGRESS NOTES
Pt here for C.2D1 Taxol/Carboplatin. Arrives ambulatory; accompanied by his wife. Labs drawn from port, results reviewed. Plt=89, ANC=0.97, total bilirubin=1.48   Pt received 1 unit of platelets 3/45/54. Dr Cece Orr notified of lab results; Dr Cece Orr requested decrease in doses of Taxol (35mg/m2) and Carboplatin (AUC=1.5) ; ok to proceed with chemo today. Pt educated on neutropenic and blood precautions. Tx complete without incident. Pt d/c'd in stable condition. Returns 5/25/21 for C.3D1 Taxol/Carbo.

## 2021-05-18 NOTE — PROGRESS NOTES
Due to low plt count (89) - Dr. Yany Mena would like reduction in carbo AUC by 25% - order updated accordingly.      Reduction in taxol dose was completed Friday by Dr. Yany Mena

## 2021-05-19 ENCOUNTER — HOSPITAL ENCOUNTER (OUTPATIENT)
Dept: RADIATION ONCOLOGY | Age: 61
Discharge: HOME | End: 2021-05-19
Attending: RADIOLOGY
Payer: MEDICARE

## 2021-05-19 VITALS
SYSTOLIC BLOOD PRESSURE: 123 MMHG | BODY MASS INDEX: 31.57 KG/M2 | HEART RATE: 82 BPM | TEMPERATURE: 97.6 F | RESPIRATION RATE: 18 BRPM | DIASTOLIC BLOOD PRESSURE: 78 MMHG | WEIGHT: 220 LBS | OXYGEN SATURATION: 98 %

## 2021-05-19 PROCEDURE — 77386 HC NTSTY MODUL RAD TX DLVR CPLX: CPT | Performed by: RADIOLOGY

## 2021-05-19 PROCEDURE — 77336 RADIATION PHYSICS CONSULT: CPT | Performed by: RADIOLOGY

## 2021-05-19 NOTE — PROGRESS NOTES
Value Ref Range Status   05/18/2021 11.4 (L) 13.5 - 17.5 g/dL Final   05/11/2021 10.5 (L) 13.5 - 17.5 g/dL Final   05/04/2021 12.4 (L) 13.5 - 17.5 g/dL Final     Platelet Count   Date Value Ref Range Status   04/19/2012 308 140 - 450 k/uL Final     Platelets   Date Value Ref Range Status   05/18/2021 89 (L) 140 - 450 k/uL Final   05/11/2021 51 (L) 140 - 450 k/uL Final   05/04/2021 112 (L) 140 - 450 k/uL Final     CREATININE   Date Value Ref Range Status   05/18/2021 <0.40 (L) 0.70 - 1.20 mg/dL Final   05/11/2021 <0.40 (L) 0.70 - 1.20 mg/dL Final     Comment:     ICTERIC SPECIMEN   05/04/2021 0.47 (L) 0.70 - 1.20 mg/dL Final     No results found for: , CEA  PSA   Date Value Ref Range Status   03/23/2017 0.44 <4.1 ug/L Final     Comment:     The Roche \"ECLIA\" assay is used. Results obtained with different assay methods   cannot be used interchangeably. Performed at [address], [address] [place] [address] [phone]     04/19/2012 0.54 0 - 4 ug/L Final     Comment:     Performed at [address], Rolf Pandey [address] [phone]       MEDICATIONS:    Current Outpatient Medications:     Magic Mouthwash (MIRACLE MOUTHWASH), Swish and spit 5 mLs 4 times daily as needed for Irritation, Disp: , Rfl:     lidocaine-prilocaine (EMLA) 2.5-2.5 % cream, Apply topically 45-60 mins prior to needle poke daily PRN, Disp: 1 Tube, Rfl: 2    prochlorperazine (COMPAZINE) 10 MG tablet, Take 1 tablet by mouth every 6 hours as needed (nausea vomiting), Disp: 120 tablet, Rfl: 3    ondansetron (ZOFRAN-ODT) 8 MG TBDP disintegrating tablet, Place 1 tablet under the tongue 3 times daily as needed for Nausea or Vomiting, Disp: 90 tablet, Rfl: 2    dexamethasone (DECADRON) 4 MG tablet, Take 5 tabs (20 mg) 12 and 6 hours before scheduled PACLItaxel (Taxol) infusion. , Disp: 30 tablet, Rfl: 2    traZODone (DESYREL) 50 MG tablet, Take 1 tablet by mouth nightly, Disp: 90 tablet, Rfl: 1    lactulose (CHRONULAC) 10 GM/15ML solution, Take 15 mLs by mouth 4 times daily as needed, Disp: , Rfl:     potassium chloride (KLOR-CON M) 20 MEQ extended release tablet, Take 1 tablet by mouth daily for 7 days (Patient not taking: Reported on 5/4/2021), Disp: 7 tablet, Rfl: 0    calcium carbonate 600 MG TABS tablet, take 1 tablet by mouth once daily, Disp: , Rfl:     D3-50 1.25 MG (12625 UT) CAPS, once a week On Tuesdays, Disp: , Rfl:     hydrOXYzine (VISTARIL) 25 MG capsule, take 1 capsule by mouth three times a day if needed for anxiety, Disp: 90 capsule, Rfl: 2    atorvastatin (LIPITOR) 20 MG tablet, take 1 tablet by mouth at bedtime, Disp: 90 tablet, Rfl: 1    spironolactone (ALDACTONE) 50 MG tablet, take 1 tablet by mouth once daily, Disp: 90 tablet, Rfl: 3    vitamin D (ERGOCALCIFEROL) 1.25 MG (23141 UT) CAPS capsule, take 1 capsule by mouth every week, Disp: 12 capsule, Rfl: 1    nadolol (CORGARD) 20 MG tablet, Take 1 tablet by mouth daily, Disp: 30 tablet, Rfl: 3    rifaximin (XIFAXAN) 550 MG tablet, Take 1 tablet by mouth 3 times daily, Disp: 42 tablet, Rfl: 0    pantoprazole (PROTONIX) 40 MG tablet, Take 1 tablet by mouth every morning (before breakfast), Disp: 30 tablet, Rfl: 3    sildenafil (VIAGRA) 100 MG tablet, Take 1 tablet by mouth as needed for Erectile Dysfunction, Disp: 10 tablet, Rfl: 3      ASSESSMENT PLAN:     Treatment setup and plan reviewed. Port images/CBCT images reviewed. Appropriate laboratory work was reviewed. Treatment side effects and toxicities reviewed with the patient, and appropriate management was advised. Will continue radiation treatment as planned, and recommend patient contact us if they have any questions or concerns. Patient was discussed the role of Magic mouthwash however he is comfortable with his symptoms presently. We did discuss using Mucinex to help with his chest congestion as well.     Electronically signed by Hannah Leija MD on 5/19/2021 at 5:59 PM      Drugs Prescribed:  New Prescriptions    No medications on file       Other Orders Placed:  No orders of the defined types were placed in this encounter.

## 2021-05-20 ENCOUNTER — HOSPITAL ENCOUNTER (OUTPATIENT)
Dept: RADIATION ONCOLOGY | Age: 61
Discharge: HOME | End: 2021-05-20
Attending: RADIOLOGY
Payer: MEDICARE

## 2021-05-20 PROCEDURE — 77386 HC NTSTY MODUL RAD TX DLVR CPLX: CPT | Performed by: RADIOLOGY

## 2021-05-20 PROCEDURE — 77014 PR CT GUIDANCE PLACEMENT RAD THERAPY FIELDS: CPT | Performed by: RADIOLOGY

## 2021-05-21 ENCOUNTER — TELEPHONE (OUTPATIENT)
Dept: ONCOLOGY | Age: 61
End: 2021-05-21

## 2021-05-21 ENCOUNTER — HOSPITAL ENCOUNTER (OUTPATIENT)
Dept: RADIATION ONCOLOGY | Age: 61
Discharge: HOME | End: 2021-05-21
Attending: RADIOLOGY
Payer: MEDICARE

## 2021-05-21 PROCEDURE — 77336 RADIATION PHYSICS CONSULT: CPT | Performed by: RADIOLOGY

## 2021-05-21 PROCEDURE — 77386 HC NTSTY MODUL RAD TX DLVR CPLX: CPT | Performed by: RADIOLOGY

## 2021-05-21 PROCEDURE — 77014 PR CT GUIDANCE PLACEMENT RAD THERAPY FIELDS: CPT | Performed by: RADIOLOGY

## 2021-05-24 ENCOUNTER — HOSPITAL ENCOUNTER (OUTPATIENT)
Dept: RADIATION ONCOLOGY | Age: 61
Discharge: HOME | End: 2021-05-24
Attending: RADIOLOGY
Payer: MEDICARE

## 2021-05-24 PROCEDURE — 77014 PR CT GUIDANCE PLACEMENT RAD THERAPY FIELDS: CPT | Performed by: RADIOLOGY

## 2021-05-24 PROCEDURE — 77386 HC NTSTY MODUL RAD TX DLVR CPLX: CPT | Performed by: RADIOLOGY

## 2021-05-24 RX ORDER — DIPHENHYDRAMINE HYDROCHLORIDE 50 MG/ML
50 INJECTION INTRAMUSCULAR; INTRAVENOUS ONCE
Status: CANCELLED | OUTPATIENT
Start: 2021-05-25

## 2021-05-24 RX ORDER — SODIUM CHLORIDE 9 MG/ML
INJECTION, SOLUTION INTRAVENOUS CONTINUOUS
Status: CANCELLED | OUTPATIENT
Start: 2021-05-25

## 2021-05-24 RX ORDER — MEPERIDINE HYDROCHLORIDE 50 MG/ML
12.5 INJECTION INTRAMUSCULAR; INTRAVENOUS; SUBCUTANEOUS ONCE
Status: CANCELLED | OUTPATIENT
Start: 2021-05-25 | End: 2021-05-25

## 2021-05-24 RX ORDER — METHYLPREDNISOLONE SODIUM SUCCINATE 125 MG/2ML
125 INJECTION, POWDER, LYOPHILIZED, FOR SOLUTION INTRAMUSCULAR; INTRAVENOUS ONCE
Status: CANCELLED | OUTPATIENT
Start: 2021-05-25 | End: 2021-05-25

## 2021-05-24 RX ORDER — DIPHENHYDRAMINE HYDROCHLORIDE 50 MG/ML
50 INJECTION INTRAMUSCULAR; INTRAVENOUS ONCE
Status: CANCELLED | OUTPATIENT
Start: 2021-05-25 | End: 2021-05-25

## 2021-05-24 RX ORDER — SODIUM CHLORIDE 9 MG/ML
20 INJECTION, SOLUTION INTRAVENOUS ONCE
Status: CANCELLED | OUTPATIENT
Start: 2021-05-25 | End: 2021-05-25

## 2021-05-24 RX ORDER — PALONOSETRON 0.05 MG/ML
0.25 INJECTION, SOLUTION INTRAVENOUS ONCE
Status: CANCELLED | OUTPATIENT
Start: 2021-05-25

## 2021-05-24 RX ORDER — SODIUM CHLORIDE 0.9 % (FLUSH) 0.9 %
5-40 SYRINGE (ML) INJECTION PRN
Status: CANCELLED | OUTPATIENT
Start: 2021-05-25

## 2021-05-24 RX ORDER — EPINEPHRINE 1 MG/ML
0.3 INJECTION, SOLUTION, CONCENTRATE INTRAVENOUS PRN
Status: CANCELLED | OUTPATIENT
Start: 2021-05-25

## 2021-05-24 RX ORDER — SODIUM CHLORIDE 9 MG/ML
25 INJECTION, SOLUTION INTRAVENOUS PRN
Status: CANCELLED | OUTPATIENT
Start: 2021-05-25

## 2021-05-24 RX ORDER — HEPARIN SODIUM (PORCINE) LOCK FLUSH IV SOLN 100 UNIT/ML 100 UNIT/ML
500 SOLUTION INTRAVENOUS PRN
Status: CANCELLED | OUTPATIENT
Start: 2021-05-25

## 2021-05-25 ENCOUNTER — HOSPITAL ENCOUNTER (OUTPATIENT)
Dept: RADIATION ONCOLOGY | Age: 61
Discharge: HOME | End: 2021-05-25
Attending: RADIOLOGY
Payer: MEDICARE

## 2021-05-25 ENCOUNTER — HOSPITAL ENCOUNTER (OUTPATIENT)
Dept: INFUSION THERAPY | Age: 61
Discharge: HOME | End: 2021-05-25
Payer: MEDICARE

## 2021-05-25 VITALS
WEIGHT: 219.8 LBS | TEMPERATURE: 97.6 F | SYSTOLIC BLOOD PRESSURE: 144 MMHG | BODY MASS INDEX: 31.54 KG/M2 | HEART RATE: 75 BPM | RESPIRATION RATE: 18 BRPM | DIASTOLIC BLOOD PRESSURE: 81 MMHG

## 2021-05-25 DIAGNOSIS — C15.5 MALIGNANT NEOPLASM OF LOWER THIRD OF ESOPHAGUS (HCC): Primary | ICD-10-CM

## 2021-05-25 LAB
ABSOLUTE EOS #: 0 K/UL (ref 0–0.4)
ABSOLUTE IMMATURE GRANULOCYTE: ABNORMAL K/UL (ref 0–0.3)
ABSOLUTE LYMPH #: 0.13 K/UL (ref 1–4.8)
ABSOLUTE MONO #: 0.02 K/UL (ref 0.1–0.8)
ALBUMIN SERPL-MCNC: 3.7 G/DL (ref 3.5–5.2)
ALBUMIN/GLOBULIN RATIO: 1.4 (ref 1–2.5)
ALP BLD-CCNC: 73 U/L (ref 40–129)
ALT SERPL-CCNC: 16 U/L (ref 5–41)
ANION GAP SERPL CALCULATED.3IONS-SCNC: 11 MMOL/L (ref 9–17)
AST SERPL-CCNC: 31 U/L
BASOPHILS # BLD: 0 % (ref 0–2)
BASOPHILS ABSOLUTE: 0 K/UL (ref 0–0.2)
BILIRUB SERPL-MCNC: 1 MG/DL (ref 0.3–1.2)
BUN BLDV-MCNC: 8 MG/DL (ref 8–23)
BUN/CREAT BLD: ABNORMAL (ref 9–20)
CALCIUM SERPL-MCNC: 9.7 MG/DL (ref 8.6–10.4)
CHLORIDE BLD-SCNC: 104 MMOL/L (ref 98–107)
CO2: 23 MMOL/L (ref 20–31)
CREAT SERPL-MCNC: 0.44 MG/DL (ref 0.7–1.2)
DIFFERENTIAL TYPE: ABNORMAL
EOSINOPHILS RELATIVE PERCENT: 0 % (ref 1–4)
GFR AFRICAN AMERICAN: >60 ML/MIN
GFR NON-AFRICAN AMERICAN: >60 ML/MIN
GFR SERPL CREATININE-BSD FRML MDRD: ABNORMAL ML/MIN/{1.73_M2}
GFR SERPL CREATININE-BSD FRML MDRD: ABNORMAL ML/MIN/{1.73_M2}
GLUCOSE BLD-MCNC: 269 MG/DL (ref 70–99)
HCT VFR BLD CALC: 35 % (ref 41–53)
HEMOGLOBIN: 11.4 G/DL (ref 13.5–17.5)
IMMATURE GRANULOCYTES: ABNORMAL %
LYMPHOCYTES # BLD: 6 % (ref 24–44)
MCH RBC QN AUTO: 29.3 PG (ref 26–34)
MCHC RBC AUTO-ENTMCNC: 32.5 G/DL (ref 31–37)
MCV RBC AUTO: 90.1 FL (ref 80–100)
MONOCYTES # BLD: 1 % (ref 1–7)
MORPHOLOGY: ABNORMAL
NRBC AUTOMATED: ABNORMAL PER 100 WBC
PDW BLD-RTO: 18.1 % (ref 12.5–15.4)
PLATELET # BLD: 137 K/UL (ref 140–450)
PLATELET ESTIMATE: ABNORMAL
PMV BLD AUTO: 8 FL (ref 6–12)
POTASSIUM SERPL-SCNC: 4.4 MMOL/L (ref 3.7–5.3)
RBC # BLD: 3.88 M/UL (ref 4.5–5.9)
RBC # BLD: ABNORMAL 10*6/UL
SEG NEUTROPHILS: 93 % (ref 36–66)
SEGMENTED NEUTROPHILS ABSOLUTE COUNT: 2.05 K/UL (ref 1.8–7.7)
SODIUM BLD-SCNC: 138 MMOL/L (ref 135–144)
TOTAL PROTEIN: 6.3 G/DL (ref 6.4–8.3)
WBC # BLD: 2.2 K/UL (ref 3.5–11)
WBC # BLD: ABNORMAL 10*3/UL

## 2021-05-25 PROCEDURE — 2500000003 HC RX 250 WO HCPCS: Performed by: INTERNAL MEDICINE

## 2021-05-25 PROCEDURE — 77014 PR CT GUIDANCE PLACEMENT RAD THERAPY FIELDS: CPT | Performed by: RADIOLOGY

## 2021-05-25 PROCEDURE — 6360000002 HC RX W HCPCS: Performed by: INTERNAL MEDICINE

## 2021-05-25 PROCEDURE — 96413 CHEMO IV INFUSION 1 HR: CPT

## 2021-05-25 PROCEDURE — 85025 COMPLETE CBC W/AUTO DIFF WBC: CPT

## 2021-05-25 PROCEDURE — 77427 RADIATION TX MANAGEMENT X5: CPT | Performed by: RADIOLOGY

## 2021-05-25 PROCEDURE — 36591 DRAW BLOOD OFF VENOUS DEVICE: CPT

## 2021-05-25 PROCEDURE — 96367 TX/PROPH/DG ADDL SEQ IV INF: CPT

## 2021-05-25 PROCEDURE — 96417 CHEMO IV INFUS EACH ADDL SEQ: CPT

## 2021-05-25 PROCEDURE — 96375 TX/PRO/DX INJ NEW DRUG ADDON: CPT

## 2021-05-25 PROCEDURE — 80053 COMPREHEN METABOLIC PANEL: CPT

## 2021-05-25 PROCEDURE — 2580000003 HC RX 258: Performed by: INTERNAL MEDICINE

## 2021-05-25 PROCEDURE — 77386 HC NTSTY MODUL RAD TX DLVR CPLX: CPT | Performed by: RADIOLOGY

## 2021-05-25 RX ORDER — HEPARIN SODIUM (PORCINE) LOCK FLUSH IV SOLN 100 UNIT/ML 100 UNIT/ML
500 SOLUTION INTRAVENOUS PRN
Status: DISCONTINUED | OUTPATIENT
Start: 2021-05-25 | End: 2021-05-26 | Stop reason: HOSPADM

## 2021-05-25 RX ORDER — SODIUM CHLORIDE 0.9 % (FLUSH) 0.9 %
5-40 SYRINGE (ML) INJECTION PRN
Status: DISCONTINUED | OUTPATIENT
Start: 2021-05-25 | End: 2021-05-26 | Stop reason: HOSPADM

## 2021-05-25 RX ORDER — SODIUM CHLORIDE 9 MG/ML
20 INJECTION, SOLUTION INTRAVENOUS ONCE
Status: COMPLETED | OUTPATIENT
Start: 2021-05-25 | End: 2021-05-25

## 2021-05-25 RX ORDER — PALONOSETRON 0.05 MG/ML
0.25 INJECTION, SOLUTION INTRAVENOUS ONCE
Status: COMPLETED | OUTPATIENT
Start: 2021-05-25 | End: 2021-05-25

## 2021-05-25 RX ORDER — DIPHENHYDRAMINE HYDROCHLORIDE 50 MG/ML
50 INJECTION INTRAMUSCULAR; INTRAVENOUS ONCE
Status: COMPLETED | OUTPATIENT
Start: 2021-05-25 | End: 2021-05-25

## 2021-05-25 RX ORDER — DEXAMETHASONE SODIUM PHOSPHATE 10 MG/ML
10 INJECTION INTRAMUSCULAR; INTRAVENOUS ONCE
Status: COMPLETED | OUTPATIENT
Start: 2021-05-25 | End: 2021-05-25

## 2021-05-25 RX ADMIN — SODIUM CHLORIDE, PRESERVATIVE FREE 10 ML: 5 INJECTION INTRAVENOUS at 11:47

## 2021-05-25 RX ADMIN — PALONOSETRON 0.25 MG: 0.05 INJECTION, SOLUTION INTRAVENOUS at 09:38

## 2021-05-25 RX ADMIN — DIPHENHYDRAMINE HYDROCHLORIDE 50 MG: 50 INJECTION, SOLUTION INTRAMUSCULAR; INTRAVENOUS at 09:42

## 2021-05-25 RX ADMIN — FAMOTIDINE 20 MG: 10 INJECTION, SOLUTION INTRAVENOUS at 09:39

## 2021-05-25 RX ADMIN — SODIUM CHLORIDE, PRESERVATIVE FREE 10 ML: 5 INJECTION INTRAVENOUS at 08:15

## 2021-05-25 RX ADMIN — DEXAMETHASONE SODIUM PHOSPHATE 10 MG: 10 INJECTION INTRAMUSCULAR; INTRAVENOUS at 09:43

## 2021-05-25 RX ADMIN — SODIUM CHLORIDE, PRESERVATIVE FREE 10 ML: 5 INJECTION INTRAVENOUS at 08:16

## 2021-05-25 RX ADMIN — SODIUM CHLORIDE 20 ML/HR: 9 INJECTION, SOLUTION INTRAVENOUS at 09:37

## 2021-05-25 RX ADMIN — CARBOPLATIN 220 MG: 10 INJECTION INTRAVENOUS at 11:07

## 2021-05-25 RX ADMIN — HEPARIN 500 UNITS: 100 SYRINGE at 11:47

## 2021-05-25 RX ADMIN — PACLITAXEL 78 MG: 6 INJECTION, SOLUTION, CONCENTRATE INTRAVENOUS at 09:59

## 2021-05-25 NOTE — PROGRESS NOTES
Pt here for C3D1 taxol/carbo. Denies any new complaints. Labs drawn from port and results reviewed. Glucose 269. Dr. Torrey Johnson informed and no orders received. Checked with Dr. Torrey Johnson if he wants carbo  AUC of 1.5 for remaining treatments and he confirmed. Zuleyka Linda informed. Pt was treated without incident and d/c'd in stable condition. Returns 6/1/21 for C4D1 and MD follow up.

## 2021-05-26 ENCOUNTER — HOSPITAL ENCOUNTER (OUTPATIENT)
Dept: RADIATION ONCOLOGY | Age: 61
Discharge: HOME | End: 2021-05-26
Attending: RADIOLOGY
Payer: MEDICARE

## 2021-05-26 VITALS
WEIGHT: 218 LBS | TEMPERATURE: 96 F | OXYGEN SATURATION: 98 % | SYSTOLIC BLOOD PRESSURE: 146 MMHG | HEART RATE: 74 BPM | BODY MASS INDEX: 31.28 KG/M2 | DIASTOLIC BLOOD PRESSURE: 83 MMHG | RESPIRATION RATE: 16 BRPM

## 2021-05-26 PROCEDURE — 77014 PR CT GUIDANCE PLACEMENT RAD THERAPY FIELDS: CPT | Performed by: RADIOLOGY

## 2021-05-26 PROCEDURE — 77386 HC NTSTY MODUL RAD TX DLVR CPLX: CPT | Performed by: RADIOLOGY

## 2021-05-27 ENCOUNTER — HOSPITAL ENCOUNTER (OUTPATIENT)
Dept: RADIATION ONCOLOGY | Age: 61
Discharge: HOME | End: 2021-05-27
Attending: RADIOLOGY
Payer: MEDICARE

## 2021-05-27 PROCEDURE — 77014 PR CT GUIDANCE PLACEMENT RAD THERAPY FIELDS: CPT | Performed by: RADIOLOGY

## 2021-05-27 PROCEDURE — 77386 HC NTSTY MODUL RAD TX DLVR CPLX: CPT | Performed by: RADIOLOGY

## 2021-05-28 ENCOUNTER — TELEPHONE (OUTPATIENT)
Dept: RADIATION ONCOLOGY | Age: 61
End: 2021-05-28

## 2021-05-28 ENCOUNTER — HOSPITAL ENCOUNTER (OUTPATIENT)
Dept: RADIATION ONCOLOGY | Age: 61
Discharge: HOME | End: 2021-05-28
Attending: RADIOLOGY
Payer: MEDICARE

## 2021-05-28 PROCEDURE — 77386 HC NTSTY MODUL RAD TX DLVR CPLX: CPT | Performed by: RADIOLOGY

## 2021-05-28 PROCEDURE — 77014 PR CT GUIDANCE PLACEMENT RAD THERAPY FIELDS: CPT | Performed by: RADIOLOGY

## 2021-05-28 NOTE — TELEPHONE ENCOUNTER
PT reports pain with swallowing.  Dr Tevin Anton notified and order for MMW called into pt's pharmacy

## 2021-05-31 ENCOUNTER — APPOINTMENT (OUTPATIENT)
Dept: RADIATION ONCOLOGY | Age: 61
End: 2021-05-31
Attending: RADIOLOGY
Payer: MEDICARE

## 2021-06-01 ENCOUNTER — HOSPITAL ENCOUNTER (OUTPATIENT)
Dept: INFUSION THERAPY | Age: 61
Discharge: HOME | End: 2021-06-01
Payer: MEDICARE

## 2021-06-01 ENCOUNTER — TELEPHONE (OUTPATIENT)
Dept: ONCOLOGY | Age: 61
End: 2021-06-01

## 2021-06-01 ENCOUNTER — OFFICE VISIT (OUTPATIENT)
Dept: ONCOLOGY | Age: 61
End: 2021-06-01
Payer: MEDICARE

## 2021-06-01 ENCOUNTER — HOSPITAL ENCOUNTER (OUTPATIENT)
Dept: RADIATION ONCOLOGY | Age: 61
Discharge: HOME | End: 2021-06-01
Attending: RADIOLOGY
Payer: MEDICARE

## 2021-06-01 VITALS
WEIGHT: 219.8 LBS | DIASTOLIC BLOOD PRESSURE: 67 MMHG | BODY MASS INDEX: 31.47 KG/M2 | HEIGHT: 70 IN | SYSTOLIC BLOOD PRESSURE: 114 MMHG | RESPIRATION RATE: 18 BRPM | HEART RATE: 81 BPM | TEMPERATURE: 97.9 F

## 2021-06-01 VITALS
DIASTOLIC BLOOD PRESSURE: 67 MMHG | HEART RATE: 81 BPM | TEMPERATURE: 97.9 F | SYSTOLIC BLOOD PRESSURE: 114 MMHG | BODY MASS INDEX: 31.42 KG/M2 | WEIGHT: 219 LBS

## 2021-06-01 DIAGNOSIS — C15.5 MALIGNANT NEOPLASM OF LOWER THIRD OF ESOPHAGUS (HCC): Primary | ICD-10-CM

## 2021-06-01 DIAGNOSIS — C15.5 MALIGNANT NEOPLASM OF LOWER THIRD OF ESOPHAGUS (HCC): ICD-10-CM

## 2021-06-01 LAB
ABSOLUTE EOS #: 0.01 K/UL (ref 0–0.4)
ABSOLUTE IMMATURE GRANULOCYTE: ABNORMAL K/UL (ref 0–0.3)
ABSOLUTE LYMPH #: 0.31 K/UL (ref 1–4.8)
ABSOLUTE MONO #: 0.16 K/UL (ref 0.1–0.8)
ALBUMIN SERPL-MCNC: 3.3 G/DL (ref 3.5–5.2)
ALBUMIN/GLOBULIN RATIO: 1.4 (ref 1–2.5)
ALP BLD-CCNC: 57 U/L (ref 40–129)
ALT SERPL-CCNC: 15 U/L (ref 5–41)
ANION GAP SERPL CALCULATED.3IONS-SCNC: 8 MMOL/L (ref 9–17)
AST SERPL-CCNC: 27 U/L
BASOPHILS # BLD: 0 % (ref 0–2)
BASOPHILS ABSOLUTE: 0 K/UL (ref 0–0.2)
BILIRUB SERPL-MCNC: 0.89 MG/DL (ref 0.3–1.2)
BUN BLDV-MCNC: 5 MG/DL (ref 8–23)
BUN/CREAT BLD: ABNORMAL (ref 9–20)
CALCIUM SERPL-MCNC: 8.4 MG/DL (ref 8.6–10.4)
CHLORIDE BLD-SCNC: 105 MMOL/L (ref 98–107)
CO2: 25 MMOL/L (ref 20–31)
CREAT SERPL-MCNC: 0.43 MG/DL (ref 0.7–1.2)
DIFFERENTIAL TYPE: ABNORMAL
EOSINOPHILS RELATIVE PERCENT: 1 % (ref 1–4)
GFR AFRICAN AMERICAN: >60 ML/MIN
GFR NON-AFRICAN AMERICAN: >60 ML/MIN
GFR SERPL CREATININE-BSD FRML MDRD: ABNORMAL ML/MIN/{1.73_M2}
GFR SERPL CREATININE-BSD FRML MDRD: ABNORMAL ML/MIN/{1.73_M2}
GLUCOSE BLD-MCNC: 134 MG/DL (ref 70–99)
HCT VFR BLD CALC: 32 % (ref 41–53)
HEMOGLOBIN: 10.5 G/DL (ref 13.5–17.5)
IMMATURE GRANULOCYTES: ABNORMAL %
LYMPHOCYTES # BLD: 24 % (ref 24–44)
MCH RBC QN AUTO: 29.5 PG (ref 26–34)
MCHC RBC AUTO-ENTMCNC: 32.9 G/DL (ref 31–37)
MCV RBC AUTO: 89.6 FL (ref 80–100)
MONOCYTES # BLD: 12 % (ref 1–7)
MORPHOLOGY: ABNORMAL
MYELOCYTES ABSOLUTE COUNT: 0.01 K/UL
MYELOCYTES: 1 %
NRBC AUTOMATED: ABNORMAL PER 100 WBC
PDW BLD-RTO: 17.6 % (ref 12.5–15.4)
PLATELET # BLD: 134 K/UL (ref 140–450)
PLATELET ESTIMATE: ABNORMAL
PMV BLD AUTO: 8 FL (ref 6–12)
POTASSIUM SERPL-SCNC: 3.5 MMOL/L (ref 3.7–5.3)
RBC # BLD: 3.57 M/UL (ref 4.5–5.9)
RBC # BLD: ABNORMAL 10*6/UL
SEG NEUTROPHILS: 62 % (ref 36–66)
SEGMENTED NEUTROPHILS ABSOLUTE COUNT: 0.81 K/UL (ref 1.8–7.7)
SODIUM BLD-SCNC: 138 MMOL/L (ref 135–144)
TOTAL PROTEIN: 5.7 G/DL (ref 6.4–8.3)
WBC # BLD: 1.3 K/UL (ref 3.5–11)
WBC # BLD: ABNORMAL 10*3/UL

## 2021-06-01 PROCEDURE — 1036F TOBACCO NON-USER: CPT | Performed by: INTERNAL MEDICINE

## 2021-06-01 PROCEDURE — 85025 COMPLETE CBC W/AUTO DIFF WBC: CPT

## 2021-06-01 PROCEDURE — G8427 DOCREV CUR MEDS BY ELIG CLIN: HCPCS | Performed by: INTERNAL MEDICINE

## 2021-06-01 PROCEDURE — 80053 COMPREHEN METABOLIC PANEL: CPT

## 2021-06-01 PROCEDURE — 96375 TX/PRO/DX INJ NEW DRUG ADDON: CPT

## 2021-06-01 PROCEDURE — 2580000003 HC RX 258: Performed by: INTERNAL MEDICINE

## 2021-06-01 PROCEDURE — G8417 CALC BMI ABV UP PARAM F/U: HCPCS | Performed by: INTERNAL MEDICINE

## 2021-06-01 PROCEDURE — 96417 CHEMO IV INFUS EACH ADDL SEQ: CPT

## 2021-06-01 PROCEDURE — 99215 OFFICE O/P EST HI 40 MIN: CPT | Performed by: INTERNAL MEDICINE

## 2021-06-01 PROCEDURE — 2500000003 HC RX 250 WO HCPCS: Performed by: INTERNAL MEDICINE

## 2021-06-01 PROCEDURE — 96413 CHEMO IV INFUSION 1 HR: CPT

## 2021-06-01 PROCEDURE — 3017F COLORECTAL CA SCREEN DOC REV: CPT | Performed by: INTERNAL MEDICINE

## 2021-06-01 PROCEDURE — 77386 HC NTSTY MODUL RAD TX DLVR CPLX: CPT | Performed by: RADIOLOGY

## 2021-06-01 PROCEDURE — 6360000002 HC RX W HCPCS: Performed by: INTERNAL MEDICINE

## 2021-06-01 PROCEDURE — 99211 OFF/OP EST MAY X REQ PHY/QHP: CPT | Performed by: INTERNAL MEDICINE

## 2021-06-01 PROCEDURE — 77014 PR CT GUIDANCE PLACEMENT RAD THERAPY FIELDS: CPT | Performed by: RADIOLOGY

## 2021-06-01 PROCEDURE — 6370000000 HC RX 637 (ALT 250 FOR IP): Performed by: INTERNAL MEDICINE

## 2021-06-01 PROCEDURE — 36591 DRAW BLOOD OFF VENOUS DEVICE: CPT

## 2021-06-01 RX ORDER — METHYLPREDNISOLONE SODIUM SUCCINATE 125 MG/2ML
125 INJECTION, POWDER, LYOPHILIZED, FOR SOLUTION INTRAMUSCULAR; INTRAVENOUS ONCE
Status: CANCELLED | OUTPATIENT
Start: 2021-06-01 | End: 2021-06-01

## 2021-06-01 RX ORDER — PALONOSETRON 0.05 MG/ML
0.25 INJECTION, SOLUTION INTRAVENOUS ONCE
Status: COMPLETED | OUTPATIENT
Start: 2021-06-01 | End: 2021-06-01

## 2021-06-01 RX ORDER — DIPHENHYDRAMINE HYDROCHLORIDE 50 MG/ML
50 INJECTION INTRAMUSCULAR; INTRAVENOUS ONCE
Status: COMPLETED | OUTPATIENT
Start: 2021-06-01 | End: 2021-06-01

## 2021-06-01 RX ORDER — SODIUM CHLORIDE 9 MG/ML
INJECTION, SOLUTION INTRAVENOUS CONTINUOUS
Status: CANCELLED | OUTPATIENT
Start: 2021-06-01

## 2021-06-01 RX ORDER — HEPARIN SODIUM (PORCINE) LOCK FLUSH IV SOLN 100 UNIT/ML 100 UNIT/ML
500 SOLUTION INTRAVENOUS PRN
Status: DISCONTINUED | OUTPATIENT
Start: 2021-06-01 | End: 2021-06-02 | Stop reason: HOSPADM

## 2021-06-01 RX ORDER — HEPARIN SODIUM (PORCINE) LOCK FLUSH IV SOLN 100 UNIT/ML 100 UNIT/ML
500 SOLUTION INTRAVENOUS PRN
Status: CANCELLED | OUTPATIENT
Start: 2021-06-01

## 2021-06-01 RX ORDER — SODIUM CHLORIDE 9 MG/ML
20 INJECTION, SOLUTION INTRAVENOUS ONCE
Status: COMPLETED | OUTPATIENT
Start: 2021-06-01 | End: 2021-06-01

## 2021-06-01 RX ORDER — EPINEPHRINE 1 MG/ML
0.3 INJECTION, SOLUTION, CONCENTRATE INTRAVENOUS PRN
Status: CANCELLED | OUTPATIENT
Start: 2021-06-01

## 2021-06-01 RX ORDER — SODIUM CHLORIDE 0.9 % (FLUSH) 0.9 %
5-40 SYRINGE (ML) INJECTION PRN
Status: DISCONTINUED | OUTPATIENT
Start: 2021-06-01 | End: 2021-06-02 | Stop reason: HOSPADM

## 2021-06-01 RX ORDER — MEPERIDINE HYDROCHLORIDE 50 MG/ML
12.5 INJECTION INTRAMUSCULAR; INTRAVENOUS; SUBCUTANEOUS ONCE
Status: CANCELLED | OUTPATIENT
Start: 2021-06-01 | End: 2021-06-01

## 2021-06-01 RX ORDER — DIPHENHYDRAMINE HYDROCHLORIDE 50 MG/ML
50 INJECTION INTRAMUSCULAR; INTRAVENOUS ONCE
Status: CANCELLED | OUTPATIENT
Start: 2021-06-01

## 2021-06-01 RX ORDER — SODIUM CHLORIDE 9 MG/ML
25 INJECTION, SOLUTION INTRAVENOUS PRN
Status: CANCELLED | OUTPATIENT
Start: 2021-06-01

## 2021-06-01 RX ORDER — DEXAMETHASONE SODIUM PHOSPHATE 10 MG/ML
10 INJECTION INTRAMUSCULAR; INTRAVENOUS ONCE
Status: COMPLETED | OUTPATIENT
Start: 2021-06-01 | End: 2021-06-01

## 2021-06-01 RX ORDER — SODIUM CHLORIDE 9 MG/ML
20 INJECTION, SOLUTION INTRAVENOUS ONCE
Status: CANCELLED | OUTPATIENT
Start: 2021-06-01 | End: 2021-06-01

## 2021-06-01 RX ORDER — DIPHENHYDRAMINE HYDROCHLORIDE 50 MG/ML
50 INJECTION INTRAMUSCULAR; INTRAVENOUS ONCE
Status: CANCELLED | OUTPATIENT
Start: 2021-06-01 | End: 2021-06-01

## 2021-06-01 RX ORDER — PALONOSETRON 0.05 MG/ML
0.25 INJECTION, SOLUTION INTRAVENOUS ONCE
Status: CANCELLED | OUTPATIENT
Start: 2021-06-01

## 2021-06-01 RX ORDER — POTASSIUM CHLORIDE 20 MEQ/1
40 TABLET, EXTENDED RELEASE ORAL ONCE
Status: COMPLETED | OUTPATIENT
Start: 2021-06-01 | End: 2021-06-01

## 2021-06-01 RX ADMIN — POTASSIUM CHLORIDE 40 MEQ: 1500 TABLET, EXTENDED RELEASE ORAL at 12:12

## 2021-06-01 RX ADMIN — DIPHENHYDRAMINE HYDROCHLORIDE 50 MG: 50 INJECTION, SOLUTION INTRAMUSCULAR; INTRAVENOUS at 11:07

## 2021-06-01 RX ADMIN — FAMOTIDINE 20 MG: 10 INJECTION, SOLUTION INTRAVENOUS at 11:05

## 2021-06-01 RX ADMIN — SODIUM CHLORIDE, PRESERVATIVE FREE 10 ML: 5 INJECTION INTRAVENOUS at 13:41

## 2021-06-01 RX ADMIN — PACLITAXEL 78 MG: 6 INJECTION, SOLUTION, CONCENTRATE INTRAVENOUS at 11:45

## 2021-06-01 RX ADMIN — DEXAMETHASONE SODIUM PHOSPHATE 10 MG: 10 INJECTION INTRAMUSCULAR; INTRAVENOUS at 11:09

## 2021-06-01 RX ADMIN — HEPARIN 500 UNITS: 100 SYRINGE at 13:41

## 2021-06-01 RX ADMIN — SODIUM CHLORIDE, PRESERVATIVE FREE 10 ML: 5 INJECTION INTRAVENOUS at 08:25

## 2021-06-01 RX ADMIN — CARBOPLATIN 220 MG: 10 INJECTION INTRAVENOUS at 13:06

## 2021-06-01 RX ADMIN — PALONOSETRON 0.25 MG: 0.05 INJECTION, SOLUTION INTRAVENOUS at 11:04

## 2021-06-01 RX ADMIN — SODIUM CHLORIDE 20 ML/HR: 9 INJECTION, SOLUTION INTRAVENOUS at 11:03

## 2021-06-01 NOTE — PROGRESS NOTES
Pt here for C.4D1 Taxol/Carboplatin. Arrives ambulatory; accompanied by his ex wife. Labs drawn from port, results reviewed. WBC=1.3, Segs Abs=0.81 Dr Fernando Barraza aware of labs. Pt was seen by Dr. Fernando Barraza, order rec'd to proceed with tx. Pt received 40 mEq of Potassium since potassium level at 3.5  Tx complete without incident. Pt d/c'd in stable condition. Returns 6/8/21for C.5D1 Taxol/Carboplatin.

## 2021-06-01 NOTE — TELEPHONE ENCOUNTER
AVS from 6/1/21     Chemo as planned  RTC 2 weeks   Next week last treatment   Check with pharamcy to see if we can use granix tomorrow    Tx today as scheduled    RV scheduled 6/22/21 @ 9:30am    Noted last tx next week    Disregard request for granix per Dr. Ike Wynn, [address] [place] [address] stated it cannot be given with RTX    PT was given AVS and an appt schedule    Electronically signed by Jamal Ellison on 6/1/2021 at 12:27 PM

## 2021-06-01 NOTE — PROGRESS NOTES
Patient ID: Antonio Christian, [birth year], [id number], 63 y.o. Referred by : Dr Matias Marquez  Reason for consultation:   Esophageal cancer T2N0Mx  Stage II   started on concurrent chemoradiation preoperatively on 5/4/21  Rt to be completed 6/9  HISTORY OF PRESENT ILLNESS:    Oncologic History:  Antonio Christian is a 63 y.o. male with a history of hepatitis C, status post treatment, liver cirrhosis, history of alcohol abuse was seen during initial consultation visit for newly diagnosed esophageal cancer. Patient reportedly had \"heavy drinking alcohol in about 2016 however recently in December he had 2 beers and he presented with hematemesis. On 12/29/2020 he had upper endoscopy which showed severe portal hypertension, gastropathy. The biopsy from the GE junction showed invasive adenocarcinoma. Patient had a follow-up EGD on 2/2/2021 which confirmed esophageal adenocarcinoma. Patient had endoscopic ultrasound on 2/12/2021 which showed T2 N0 MX disease with underlying esophageal varices. In the esophagus hypoechoic lesion noted in the lower esophagus penetrating into submucosa and into muscularis propria. No lymphadenopathy noted. Patient was referred to medical oncology for further recommendations. He denies any difficulty swallowing. He does not smoke he has quit smoking in 2016. He has not drank alcohol since December. He has a history of hepatitis C and he has received treatment. He lives by himself. He is accompanied by his ex-wife during today's office visit. INTERVAL HISTORY:   Patient Is returning for follow-up visit and to discuss further recommendations. He does have some difficulty in swallowing. He denies any fever chills, nausea vomiting or abdominal pain. During this visit patient's allergy, social, medical, surgical history and medications were reviewed and updated.     Past Medical History:   Diagnosis Date    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 ([facility]     Alcohol abuse     6-12 beers a day; quit drinking July 2016    Arthritis     Back pain, chronic     dr. Danell Felty, orthopedic, every 3-4 months, gets steroid injection    Lezama esophagus     BPH (benign prostatic hypertrophy)     Cholelithiasis     Cirrhosis [facility]     COVID-19 12/2020    pt reports he tested + while at [facility] DDD (degenerative disc disease), lumbar     Esophageal varices ([facility]     Fatty liver     GERD (gastroesophageal reflux disease)     Hep C w/o coma, chronic ([facility]     History of blood transfusion     History of colon polyps 2016    Augustine (hard of hearing)     Hyperlipidemia     Hypertension     Stomach ulcer     hx of    Tobacco abuse     Tubular adenoma of colon 2016, 2018    Wears glasses        Past Surgical History:   Procedure Laterality Date    BUNIONECTOMY      twice on right side    BUNIONECTOMY Left     CARPAL TUNNEL RELEASE Right     COLONOSCOPY      at age 35    COLONOSCOPY  10/05/2016    polyps-pathology tubular adenoma, and abnormal looking mucosa right colon-pathology-tubular adenoma    COLONOSCOPY N/A 3/30/2018    COLONOSCOPY POLYPECTOMY COLD BIOPSY performed by Jerald Bai MD at [place] [address]  03/30/2018    Small polyp in the sigmoid colon and excised with biopsy forceps--tubular adenoma    ENDOSCOPY, COLON, DIAGNOSTIC      EGD    IR PORT PLACEMENT EQUAL OR GREATER THAN 5 YEARS  4/19/2021    IR PORT PLACEMENT EQUAL OR GREATER THAN 5 YEARS 4/19/2021 [facility]ED[facility]    KNEE SURGERY Left     cyst removed    NASAL SEPTUM SURGERY      NERVE BLOCK Right 11/23/2020    NERVE BLOCK RIGHT CERVICAL STEROID INJECTION  C3-C6 performed by Shannan Ojeda MD at [address] [place] [address]  01/04/16    steroid injection C7 T1    OTHER SURGICAL HISTORY  11/21/2016    Bilateral Lumbar CACHORRO L4-L5 injections    OTHER SURGICAL HISTORY  12/19/2016    lumbar steroid injection    OTHER SURGICAL HISTORY  09/28/2018    BILATERAL L5 CACHORRO (N/A Back)    OTHER SURGICAL HISTORY Right 11/23/2020    cervical injection    PAIN MANAGEMENT PROCEDURE Left 7/9/2020    EPIDURAL STEROID INJECTION LEFT L4 L5 performed by Khushboo Kent MD at [facility] Left 7/20/2020    LEFT L4 L5 EPIDURAL STEROID INJECTION performed by Khushboo Kent MD at [facility] [address] Bilateral 8/17/2020    LUMBAR FACET BILATERAL L2-L5 performed by Khushboo Kent MD at [place] [id number] Bilateral 12/7/2020    NERVE BLOCK BILATERAL LUMBAR MEDIAL BRANCH L2-L5 performed by Khushboo Kent MD at [address] AA&/STRD TFRML EPI LUMBAR/SACRAL 1 LEVEL Bilateral 9/6/2018    BILATERAL L5 CACHORRO performed by Khushboo Kent MD at [address] AA&/STRD TFRML EPI LUMBAR/SACRAL 1 LEVEL N/A 9/28/2018    BILATERAL L5 CACHORRO performed by Khushboo Kent MD at [address] [facility] N/CARPAL TUNNEL SURG Right 8/29/2017    CARPAL TUNNEL RELEASE RIGHT performed by Ivan Shone, MD at [address] [facility] N/CARPAL TUNNEL SURG Left 10/31/2017    CARPAL TUNNEL RELEASE performed by Ivan Shone, MD at [address] [place] [address] 12/29/2020    EGD BIOPSY performed by Cristobal Heimlich, MD at [place] [address] 2/2/2021    EGD BIOPSY and spot marking performed by Stuart Fritz MD at [place] [address] 2/12/2021    ENDOSCOPIC ULTRASOUND, EGD performed by Reina Eddy MD at [address] [place] [address]  2/12/2021    EGD DIAGNOSTIC ONLY performed by Reina Eddy MD at [address] [place] [address] EXTRACTION       Allergies   Allergen Reactions    Pollen Extract      Runny nose, watery eyes       Current Outpatient Medications   Medication Sig Dispense Refill    Magic Mouthwash (MIRACLE MOUTHWASH) Swish and spit 5 mLs 4 times daily as needed for Irritation      lidocaine-prilocaine (EMLA) 2.5-2.5 % cream Apply topically 45-60 mins prior to needle poke daily PRN 1 Tube 2    prochlorperazine (COMPAZINE) 10 MG tablet Take 1 tablet by mouth every 6 hours as needed (nausea vomiting) 120 tablet 3    ondansetron (ZOFRAN-ODT) 8 MG TBDP disintegrating tablet Place 1 tablet under the tongue 3 times daily as needed for Nausea or Vomiting 90 tablet 2    dexamethasone (DECADRON) 4 MG tablet Take 5 tabs (20 mg) 12 and 6 hours before scheduled PACLItaxel (Taxol) infusion. 30 tablet 2    traZODone (DESYREL) 50 MG tablet Take 1 tablet by mouth nightly 90 tablet 1    lactulose (CHRONULAC) 10 GM/15ML solution Take 15 mLs by mouth 4 times daily as needed      calcium carbonate 600 MG TABS tablet take 1 tablet by mouth once daily      D3-50 1.25 MG (04236 UT) CAPS once a week On Tuesdays      hydrOXYzine (VISTARIL) 25 MG capsule take 1 capsule by mouth three times a day if needed for anxiety 90 capsule 2    atorvastatin (LIPITOR) 20 MG tablet take 1 tablet by mouth at bedtime 90 tablet 1    spironolactone (ALDACTONE) 50 MG tablet take 1 tablet by mouth once daily 90 tablet 3    vitamin D (ERGOCALCIFEROL) 1.25 MG (37570 UT) CAPS capsule take 1 capsule by mouth every week 12 capsule 1    nadolol (CORGARD) 20 MG tablet Take 1 tablet by mouth daily 30 tablet 3    rifaximin (XIFAXAN) 550 MG tablet Take 1 tablet by mouth 3 times daily 42 tablet 0    pantoprazole (PROTONIX) 40 MG tablet Take 1 tablet by mouth every morning (before breakfast) 30 tablet 3    sildenafil (VIAGRA) 100 MG tablet Take 1 tablet by mouth as needed for Erectile Dysfunction 10 tablet 3    potassium chloride (KLOR-CON M) 20 MEQ extended release tablet Take 1 tablet by mouth daily for 7 days (Patient not taking: Reported on 5/4/2021) 7 tablet 0     No current facility-administered medications for this visit.      Facility-Administered Medications Ordered in Other Visits   Medication Dose Route Frequency Provider Last Rate Last Admin    sodium chloride flush 0.9 % injection 5-40 mL  5-40 mL bone    Diabetes Sister     Asthma Brother         REVIEW OF SYSTEM:     Constitutional: No fever or chills. No night sweats, no weight loss   Eyes: No eye discharge, double vision, or eye pain   HEENT: negative for sore mouth, sore throat, hoarseness and voice change   Respiratory: negative for cough , sputum, dyspnea, wheezing, hemoptysis, chest pain   Cardiovascular: negative for chest pain, dyspnea, palpitations, orthopnea, PND   Gastrointestinal: negative for nausea, vomiting, diarrhea, constipation, abdominal pain, Dysphagia, hematemesis and hematochezia   Genitourinary: negative for frequency, dysuria, nocturia, urinary incontinence, and hematuria   Integument: negative for rash, skin lesions, bruises.    Hematologic/Lymphatic: negative for easy bruising, bleeding, lymphadenopathy, petechiae and swelling/edema   Endocrine: negative for heat or cold intolerance, tremor, weight changes, change in bowel habits and hair loss   Musculoskeletal: negative for myalgias, arthralgias, pain, joint swelling,and bone pain   Neurological: negative for headaches, dizziness, seizures, weakness, numbness       OBJECTIVE:         Vitals:    06/01/21 0911   BP: 114/67   Pulse: 81   Temp: 97.9 °F (36.6 °C)       PHYSICAL EXAM:   General appearance - well appearing, no in pain or distress   Mental status - alert and cooperative   Eyes - pupils equal and reactive, extraocular eye movements intact   Ears - bilateral TM's and external ear canals normal   Mouth - mucous membranes moist, pharynx normal without lesions   Neck - supple, no significant adenopathy   Lymphatics - no palpable lymphadenopathy, no hepatosplenomegaly   Chest - clear to auscultation, no wheezes, rales or rhonchi, symmetric air entry   Heart - normal rate, regular rhythm, normal S1, S2, no murmurs, rubs, clicks or gallops   Abdomen - soft, nontender, nondistended, no masses or organomegaly   Neurological - alert, oriented, normal speech, no focal findings or movement disorder noted   Musculoskeletal - no joint tenderness, deformity or swelling   Extremities - peripheral pulses normal, no pedal edema, no clubbing or cyanosis   Skin - normal coloration and turgor, no rashes, no suspicious skin lesions noted   LABORATORY DATA:     Lab Results   Component Value Date    WBC 1.3 (LL) 06/01/2021    HGB 10.5 (L) 06/01/2021    HCT 32.0 (L) 06/01/2021    MCV 89.6 06/01/2021     (L) 06/01/2021    LYMPHOPCT 24 06/01/2021    RBC 3.57 (L) 06/01/2021    MCH 29.5 06/01/2021    MCHC 32.9 06/01/2021    RDW 17.6 (H) 06/01/2021    MONOPCT 12 (H) 06/01/2021    BASOPCT 0 06/01/2021    NEUTROABS 0.81 (L) 06/01/2021    LYMPHSABS 0.31 (L) 06/01/2021    MONOSABS 0.16 06/01/2021    EOSABS 0.01 06/01/2021    BASOSABS 0.00 06/01/2021       Chemistry        Component Value Date/Time     06/01/2021 0825    K 3.5 (L) 06/01/2021 0825     06/01/2021 0825    CO2 25 06/01/2021 0825    BUN 5 (L) 06/01/2021 0825    CREATININE 0.43 (L) 06/01/2021 0825        Component Value Date/Time    CALCIUM 8.4 (L) 06/01/2021 0825    ALKPHOS 57 06/01/2021 0825    AST 27 06/01/2021 0825    ALT 15 06/01/2021 0825    BILITOT 0.89 06/01/2021 0825        PATHOLOGY DATA:   Surgical Pathology Report  Surgical Pathology  Collected: 12/29/20 1334   Lab status: Final   Resulting lab: [address] LAB   Value: [id number]   [address]   [address] [facility], [address] [facility]   [phone]   Fax: [phone]   SURGICAL PATHOLOGY REPORT     Patient Name: Juana Lomas   MR#: [id number]   Specimen #[id number]         Final Diagnosis   GASTROESOPHAGEAL JUNCTION, BIOPSY:          INVASIVE ADENOCARCINOMA    Final Diagnosis   ESOPHAGUS, LESION AT 34 CM, BIOPSY:          INVASIVE ADENOCARCINOMA ARISING IN TUBULAR ADENOMA WITH HIGH   GRADE DYSPLASIA, ASSOCIATED WITH FOCAL INTESTINAL METAPLASIA (SEE   COMMENT)     Diagnosis Comment   The malignancy diagnosis is confirmed by review of a second   pathologist. Anjelica Mariah result of HER2 IHC with reflex to [address] [place] [address] for   gastroesophageal adenocarcinoma will be issued in an addendum.    ADDENDUM ([facility])     Date Ordered:     2/16/2021     Status: Signed Out        Date Complete:     2/16/2021     By: Dinora Evangelista M.D.        Date Reported:     2/16/2021       ADDENDUM COMMENT   This addendum is issued in order to incorporate the result of HER2 by   [address] [place] [address], performed at [address] [place], [place] [address] ([id number], 02/16/2021).       \"RESULTS:  INDETERMINATE     Interpretation:     Average HER2 signals/nucleus:  4.4   Average SUNG 17 signals/nucleus:  3.4   HER2/SUNG 17 signal ratio:  1.3   Number of Observers:  2     Even after analysis of additional cells and review of slides by a   pathologist, FISH results show a HER2/SUNG 17 ratio < 2.0 and an   average of 4-6 HER2 signals per nucleus and 3 or more SUNG 17 signals   per nucleus.  The results are INDETERMINATE.       In this situation, the 2016 CAP/ASCP/ASCO guidelines recommend   selecting a different tumor block for HER2 testing, if available. \"       Of note, there is only one block available for testing of invasive   esophageal adenocarcinoma tumor cells.  It was already used for HER2   IHC and HER2 FISH testing with the results issued in the addendums.     IMAGING DATA:    Reviewed  ASSESSMENT:    Swetha Cartagena is a 63 y.o. male with history of hepatitis C, liver cirrhosis, history of alcohol abuse, with esophageal cancer. In total, I spent greater than 80 minutes in face-to-face consultation with the patient and the majority of this time was spent in education, counseling, and coordination of care. During our encounter, I personally reviewed the above history and evaluation, including radiology and pathology data, in detail  I reviewed the NCCN guidelines and goals of care. Clinically appears to have T2 N0 M0  Stag II, disease.   Started on preoperative  concurrent chemoradiation   I reviewed the risk benefits and side effects of chemoRT. During today's visit, the patient and the family had a number of reasonable questions which were answered to their satisfaction. They verbalized understanding of the information provided and they agreed to proceed as outlined above. PLAN:   I reviewed the treatment plan with patient and family  He tolerated first cycle of chemotherapy well however he does have thrombocytopenia possibly from his liver disease and subsequently chemotherapy decreasing it further  Continue chemo as planned  Return to clinic in 2 weeks or earlier if needed    Garrison Floyd MD  Hematologist/Medical Oncologist      This note is created with the assistance of a speech recognition program.  While intending to generate a document that actually reflects the content of the visit, the document can still have some errors including those of syntax and sound a like substitutions which may escape proof reading. It such instances, actual meaning can be extrapolated by contextual diversion.

## 2021-06-01 NOTE — PATIENT INSTRUCTIONS
Chemo as planned  RTC 2 weeks   Next week last treatment   Check with pharamcy to see if we can use granix tomorrow

## 2021-06-02 ENCOUNTER — HOSPITAL ENCOUNTER (OUTPATIENT)
Dept: RADIATION ONCOLOGY | Age: 61
Discharge: HOME | End: 2021-06-02
Attending: RADIOLOGY
Payer: MEDICARE

## 2021-06-02 VITALS
DIASTOLIC BLOOD PRESSURE: 75 MMHG | HEART RATE: 70 BPM | RESPIRATION RATE: 18 BRPM | WEIGHT: 219 LBS | OXYGEN SATURATION: 98 % | BODY MASS INDEX: 31.42 KG/M2 | TEMPERATURE: 98.2 F | SYSTOLIC BLOOD PRESSURE: 158 MMHG

## 2021-06-02 PROCEDURE — 77336 RADIATION PHYSICS CONSULT: CPT | Performed by: RADIOLOGY

## 2021-06-02 PROCEDURE — 77427 RADIATION TX MANAGEMENT X5: CPT | Performed by: RADIOLOGY

## 2021-06-02 PROCEDURE — 77014 PR CT GUIDANCE PLACEMENT RAD THERAPY FIELDS: CPT | Performed by: RADIOLOGY

## 2021-06-02 PROCEDURE — 77386 HC NTSTY MODUL RAD TX DLVR CPLX: CPT | Performed by: RADIOLOGY

## 2021-06-02 NOTE — PROGRESS NOTES
k/uL Final     Hemoglobin   Date Value Ref Range Status   06/01/2021 10.5 (L) 13.5 - 17.5 g/dL Final   05/25/2021 11.4 (L) 13.5 - 17.5 g/dL Final   05/18/2021 11.4 (L) 13.5 - 17.5 g/dL Final     Platelet Count   Date Value Ref Range Status   04/19/2012 308 140 - 450 k/uL Final     Platelets   Date Value Ref Range Status   06/01/2021 134 (L) 140 - 450 k/uL Final   05/25/2021 137 (L) 140 - 450 k/uL Final   05/18/2021 89 (L) 140 - 450 k/uL Final     CREATININE   Date Value Ref Range Status   06/01/2021 0.43 (L) 0.70 - 1.20 mg/dL Final   05/25/2021 0.44 (L) 0.70 - 1.20 mg/dL Final   05/18/2021 <0.40 (L) 0.70 - 1.20 mg/dL Final     No results found for: , CEA  PSA   Date Value Ref Range Status   03/23/2017 0.44 <4.1 ug/L Final     Comment:     The Roche \"ECLIA\" assay is used. Results obtained with different assay methods   cannot be used interchangeably. Performed at [facility] [address], [address] [place] [address] [phone]     04/19/2012 0.54 0 - 4 ug/L Final     Comment:     Performed at [facility] [address], [place] [address] [phone]       MEDICATIONS:    Current Outpatient Medications:     Magic Mouthwash (MIRACLE MOUTHWASH), Swish and spit 5 mLs 4 times daily as needed for Irritation, Disp: , Rfl:     lidocaine-prilocaine (EMLA) 2.5-2.5 % cream, Apply topically 45-60 mins prior to needle poke daily PRN, Disp: 1 Tube, Rfl: 2    prochlorperazine (COMPAZINE) 10 MG tablet, Take 1 tablet by mouth every 6 hours as needed (nausea vomiting), Disp: 120 tablet, Rfl: 3    ondansetron (ZOFRAN-ODT) 8 MG TBDP disintegrating tablet, Place 1 tablet under the tongue 3 times daily as needed for Nausea or Vomiting, Disp: 90 tablet, Rfl: 2    dexamethasone (DECADRON) 4 MG tablet, Take 5 tabs (20 mg) 12 and 6 hours before scheduled PACLItaxel (Taxol) infusion. , Disp: 30 tablet, Rfl: 2    traZODone (DESYREL) 50 MG tablet, Take 1 tablet by mouth nightly, Disp: 90 tablet, Rfl: 1    lactulose Other Orders Placed:  No orders of the defined types were placed in this encounter.

## 2021-06-03 ENCOUNTER — HOSPITAL ENCOUNTER (OUTPATIENT)
Dept: RADIATION ONCOLOGY | Age: 61
Discharge: HOME | End: 2021-06-03
Attending: RADIOLOGY
Payer: MEDICARE

## 2021-06-03 PROCEDURE — 77014 PR CT GUIDANCE PLACEMENT RAD THERAPY FIELDS: CPT | Performed by: RADIOLOGY

## 2021-06-03 PROCEDURE — 77386 HC NTSTY MODUL RAD TX DLVR CPLX: CPT | Performed by: RADIOLOGY

## 2021-06-04 ENCOUNTER — HOSPITAL ENCOUNTER (OUTPATIENT)
Dept: RADIATION ONCOLOGY | Age: 61
Discharge: HOME | End: 2021-06-04
Attending: RADIOLOGY
Payer: MEDICARE

## 2021-06-04 PROCEDURE — 77386 HC NTSTY MODUL RAD TX DLVR CPLX: CPT | Performed by: RADIOLOGY

## 2021-06-04 PROCEDURE — 77014 PR CT GUIDANCE PLACEMENT RAD THERAPY FIELDS: CPT | Performed by: RADIOLOGY

## 2021-06-07 ENCOUNTER — APPOINTMENT (OUTPATIENT)
Dept: RADIATION ONCOLOGY | Age: 61
End: 2021-06-07
Attending: RADIOLOGY
Payer: MEDICARE

## 2021-06-07 ENCOUNTER — HOSPITAL ENCOUNTER (OUTPATIENT)
Dept: RADIATION ONCOLOGY | Age: 61
Discharge: HOME | End: 2021-06-07
Attending: RADIOLOGY
Payer: MEDICARE

## 2021-06-07 ENCOUNTER — TELEPHONE (OUTPATIENT)
Dept: ONCOLOGY | Age: 61
End: 2021-06-07

## 2021-06-07 PROCEDURE — 77338 DESIGN MLC DEVICE FOR IMRT: CPT | Performed by: RADIOLOGY

## 2021-06-07 PROCEDURE — 77300 RADIATION THERAPY DOSE PLAN: CPT | Performed by: RADIOLOGY

## 2021-06-07 NOTE — TELEPHONE ENCOUNTER
NUTRITION NOTE    Called pt on listed phone number. No answer, detailed message left with return number.      TYRONE Oh, RD, LD  Registered Dietitian  Carolyn Nevarez  [phone]

## 2021-06-08 ENCOUNTER — HOSPITAL ENCOUNTER (OUTPATIENT)
Dept: INFUSION THERAPY | Age: 61
Discharge: HOME | End: 2021-06-08
Payer: MEDICARE

## 2021-06-08 ENCOUNTER — HOSPITAL ENCOUNTER (OUTPATIENT)
Dept: RADIATION ONCOLOGY | Age: 61
Discharge: HOME | End: 2021-06-08
Attending: RADIOLOGY
Payer: MEDICARE

## 2021-06-08 VITALS
HEART RATE: 112 BPM | SYSTOLIC BLOOD PRESSURE: 133 MMHG | WEIGHT: 214.8 LBS | DIASTOLIC BLOOD PRESSURE: 90 MMHG | RESPIRATION RATE: 18 BRPM | BODY MASS INDEX: 30.75 KG/M2 | TEMPERATURE: 97.7 F | HEIGHT: 70 IN

## 2021-06-08 DIAGNOSIS — C15.5 MALIGNANT NEOPLASM OF LOWER THIRD OF ESOPHAGUS (HCC): Primary | ICD-10-CM

## 2021-06-08 LAB
ABSOLUTE EOS #: 0 K/UL (ref 0–0.4)
ABSOLUTE IMMATURE GRANULOCYTE: ABNORMAL K/UL (ref 0–0.3)
ABSOLUTE LYMPH #: 0.06 K/UL (ref 1–4.8)
ABSOLUTE MONO #: 0.13 K/UL (ref 0.1–0.8)
ALBUMIN SERPL-MCNC: 3.2 G/DL (ref 3.5–5.2)
ALBUMIN/GLOBULIN RATIO: 1.2 (ref 1–2.5)
ALP BLD-CCNC: 71 U/L (ref 40–129)
ALT SERPL-CCNC: 14 U/L (ref 5–41)
ANION GAP SERPL CALCULATED.3IONS-SCNC: 12 MMOL/L (ref 9–17)
AST SERPL-CCNC: 28 U/L
BASOPHILS # BLD: 0 % (ref 0–2)
BASOPHILS ABSOLUTE: 0 K/UL (ref 0–0.2)
BILIRUB SERPL-MCNC: 1.22 MG/DL (ref 0.3–1.2)
BUN BLDV-MCNC: 7 MG/DL (ref 8–23)
BUN/CREAT BLD: ABNORMAL (ref 9–20)
CALCIUM SERPL-MCNC: 8.8 MG/DL (ref 8.6–10.4)
CHLORIDE BLD-SCNC: 98 MMOL/L (ref 98–107)
CO2: 24 MMOL/L (ref 20–31)
CREAT SERPL-MCNC: 0.46 MG/DL (ref 0.7–1.2)
DIFFERENTIAL TYPE: ABNORMAL
EOSINOPHILS RELATIVE PERCENT: 0 % (ref 1–4)
GFR AFRICAN AMERICAN: >60 ML/MIN
GFR NON-AFRICAN AMERICAN: >60 ML/MIN
GFR SERPL CREATININE-BSD FRML MDRD: ABNORMAL ML/MIN/{1.73_M2}
GFR SERPL CREATININE-BSD FRML MDRD: ABNORMAL ML/MIN/{1.73_M2}
GLUCOSE BLD-MCNC: 136 MG/DL (ref 70–99)
HCT VFR BLD CALC: 29.8 % (ref 41–53)
HEMOGLOBIN: 9.8 G/DL (ref 13.5–17.5)
IMMATURE GRANULOCYTES: ABNORMAL %
LYMPHOCYTES # BLD: 3 % (ref 24–44)
MCH RBC QN AUTO: 29.4 PG (ref 26–34)
MCHC RBC AUTO-ENTMCNC: 32.8 G/DL (ref 31–37)
MCV RBC AUTO: 89.5 FL (ref 80–100)
MONOCYTES # BLD: 7 % (ref 1–7)
MORPHOLOGY: NORMAL
NRBC AUTOMATED: ABNORMAL PER 100 WBC
NUCLEATED RED BLOOD CELLS: 2 PER 100 WBC
PDW BLD-RTO: 17.3 % (ref 12.5–15.4)
PLATELET # BLD: 134 K/UL (ref 140–450)
PLATELET ESTIMATE: ABNORMAL
PMV BLD AUTO: 8 FL (ref 6–12)
POTASSIUM SERPL-SCNC: 3.8 MMOL/L (ref 3.7–5.3)
RBC # BLD: 3.33 M/UL (ref 4.5–5.9)
RBC # BLD: ABNORMAL 10*6/UL
SEG NEUTROPHILS: 90 % (ref 36–66)
SEGMENTED NEUTROPHILS ABSOLUTE COUNT: 1.71 K/UL (ref 1.8–7.7)
SODIUM BLD-SCNC: 134 MMOL/L (ref 135–144)
TOTAL PROTEIN: 5.8 G/DL (ref 6.4–8.3)
WBC # BLD: 1.9 K/UL (ref 3.5–11)
WBC # BLD: ABNORMAL 10*3/UL

## 2021-06-08 PROCEDURE — 77386 HC NTSTY MODUL RAD TX DLVR CPLX: CPT | Performed by: RADIOLOGY

## 2021-06-08 PROCEDURE — 80053 COMPREHEN METABOLIC PANEL: CPT

## 2021-06-08 PROCEDURE — 36591 DRAW BLOOD OFF VENOUS DEVICE: CPT

## 2021-06-08 PROCEDURE — 77014 PR CT GUIDANCE PLACEMENT RAD THERAPY FIELDS: CPT | Performed by: RADIOLOGY

## 2021-06-08 PROCEDURE — 96375 TX/PRO/DX INJ NEW DRUG ADDON: CPT

## 2021-06-08 PROCEDURE — 85025 COMPLETE CBC W/AUTO DIFF WBC: CPT

## 2021-06-08 PROCEDURE — 6360000002 HC RX W HCPCS: Performed by: INTERNAL MEDICINE

## 2021-06-08 PROCEDURE — 2500000003 HC RX 250 WO HCPCS: Performed by: INTERNAL MEDICINE

## 2021-06-08 PROCEDURE — 2580000003 HC RX 258: Performed by: INTERNAL MEDICINE

## 2021-06-08 PROCEDURE — 96413 CHEMO IV INFUSION 1 HR: CPT

## 2021-06-08 PROCEDURE — 96417 CHEMO IV INFUS EACH ADDL SEQ: CPT

## 2021-06-08 RX ORDER — HEPARIN SODIUM (PORCINE) LOCK FLUSH IV SOLN 100 UNIT/ML 100 UNIT/ML
500 SOLUTION INTRAVENOUS PRN
Status: DISCONTINUED | OUTPATIENT
Start: 2021-06-08 | End: 2021-06-09 | Stop reason: HOSPADM

## 2021-06-08 RX ORDER — DEXAMETHASONE SODIUM PHOSPHATE 10 MG/ML
10 INJECTION INTRAMUSCULAR; INTRAVENOUS ONCE
Status: COMPLETED | OUTPATIENT
Start: 2021-06-08 | End: 2021-06-08

## 2021-06-08 RX ORDER — DIPHENHYDRAMINE HYDROCHLORIDE 50 MG/ML
50 INJECTION INTRAMUSCULAR; INTRAVENOUS ONCE
Status: CANCELLED | OUTPATIENT
Start: 2021-06-08 | End: 2021-06-08

## 2021-06-08 RX ORDER — SODIUM CHLORIDE 0.9 % (FLUSH) 0.9 %
5-40 SYRINGE (ML) INJECTION PRN
Status: DISCONTINUED | OUTPATIENT
Start: 2021-06-08 | End: 2021-06-09 | Stop reason: HOSPADM

## 2021-06-08 RX ORDER — DIPHENHYDRAMINE HYDROCHLORIDE 50 MG/ML
50 INJECTION INTRAMUSCULAR; INTRAVENOUS ONCE
Status: COMPLETED | OUTPATIENT
Start: 2021-06-08 | End: 2021-06-08

## 2021-06-08 RX ORDER — SODIUM CHLORIDE 9 MG/ML
25 INJECTION, SOLUTION INTRAVENOUS PRN
Status: CANCELLED | OUTPATIENT
Start: 2021-06-08

## 2021-06-08 RX ORDER — SODIUM CHLORIDE 9 MG/ML
20 INJECTION, SOLUTION INTRAVENOUS ONCE
Status: COMPLETED | OUTPATIENT
Start: 2021-06-08 | End: 2021-06-08

## 2021-06-08 RX ORDER — METHYLPREDNISOLONE SODIUM SUCCINATE 125 MG/2ML
125 INJECTION, POWDER, LYOPHILIZED, FOR SOLUTION INTRAMUSCULAR; INTRAVENOUS ONCE
Status: CANCELLED | OUTPATIENT
Start: 2021-06-08 | End: 2021-06-08

## 2021-06-08 RX ORDER — PALONOSETRON 0.05 MG/ML
0.25 INJECTION, SOLUTION INTRAVENOUS ONCE
Status: COMPLETED | OUTPATIENT
Start: 2021-06-08 | End: 2021-06-08

## 2021-06-08 RX ORDER — SODIUM CHLORIDE 9 MG/ML
INJECTION, SOLUTION INTRAVENOUS CONTINUOUS
Status: CANCELLED | OUTPATIENT
Start: 2021-06-08

## 2021-06-08 RX ORDER — EPINEPHRINE 1 MG/ML
0.3 INJECTION, SOLUTION, CONCENTRATE INTRAVENOUS PRN
Status: CANCELLED | OUTPATIENT
Start: 2021-06-08

## 2021-06-08 RX ADMIN — SODIUM CHLORIDE, PRESERVATIVE FREE 10 ML: 5 INJECTION INTRAVENOUS at 08:25

## 2021-06-08 RX ADMIN — SODIUM CHLORIDE, PRESERVATIVE FREE 10 ML: 5 INJECTION INTRAVENOUS at 12:13

## 2021-06-08 RX ADMIN — PALONOSETRON 0.25 MG: 0.05 INJECTION, SOLUTION INTRAVENOUS at 09:29

## 2021-06-08 RX ADMIN — HEPARIN 500 UNITS: 100 SYRINGE at 12:13

## 2021-06-08 RX ADMIN — PACLITAXEL 78 MG: 6 INJECTION, SOLUTION, CONCENTRATE INTRAVENOUS at 10:01

## 2021-06-08 RX ADMIN — FAMOTIDINE 20 MG: 10 INJECTION, SOLUTION INTRAVENOUS at 09:30

## 2021-06-08 RX ADMIN — SODIUM CHLORIDE 20 ML/HR: 9 INJECTION, SOLUTION INTRAVENOUS at 09:20

## 2021-06-08 RX ADMIN — DEXAMETHASONE SODIUM PHOSPHATE 10 MG: 10 INJECTION INTRAMUSCULAR; INTRAVENOUS at 09:35

## 2021-06-08 RX ADMIN — DIPHENHYDRAMINE HYDROCHLORIDE 50 MG: 50 INJECTION INTRAMUSCULAR; INTRAVENOUS at 09:33

## 2021-06-08 RX ADMIN — CARBOPLATIN 230 MG: 10 INJECTION INTRAVENOUS at 11:29

## 2021-06-08 NOTE — PROGRESS NOTES
Pt here for C.5D1 Taxol/Carboplatin. Arrives ambulatory; accompanied by his ex wife. C/O fatigue, no appetite & painful swallowing; pt had a 5 lb wt loss since last week. Pt declined a visit with dietary at this time. Labs drawn from port, results reviewed. Pt received radiation tx; clarified that pt's last radiation tx is on 6/11/21. Tx complete without incident. Pt d/c'd in stable condition. Returns 6/22/21 for follow up with Dr Chandan Garcia.

## 2021-06-09 ENCOUNTER — HOSPITAL ENCOUNTER (OUTPATIENT)
Dept: RADIATION ONCOLOGY | Age: 61
Discharge: HOME | End: 2021-06-09
Attending: RADIOLOGY
Payer: MEDICARE

## 2021-06-09 VITALS
WEIGHT: 219 LBS | TEMPERATURE: 97.8 F | SYSTOLIC BLOOD PRESSURE: 160 MMHG | RESPIRATION RATE: 18 BRPM | HEART RATE: 100 BPM | DIASTOLIC BLOOD PRESSURE: 103 MMHG | OXYGEN SATURATION: 97 % | BODY MASS INDEX: 31.42 KG/M2

## 2021-06-09 DIAGNOSIS — C15.5 MALIGNANT NEOPLASM OF LOWER THIRD OF ESOPHAGUS (HCC): Primary | ICD-10-CM

## 2021-06-09 PROCEDURE — 77336 RADIATION PHYSICS CONSULT: CPT | Performed by: RADIOLOGY

## 2021-06-09 PROCEDURE — 77014 PR CT GUIDANCE PLACEMENT RAD THERAPY FIELDS: CPT | Performed by: RADIOLOGY

## 2021-06-09 PROCEDURE — 77386 HC NTSTY MODUL RAD TX DLVR CPLX: CPT | Performed by: RADIOLOGY

## 2021-06-09 ASSESSMENT — PAIN SCALES - GENERAL: PAINLEVEL_OUTOF10: 10

## 2021-06-09 ASSESSMENT — PAIN DESCRIPTION - PAIN TYPE: TYPE: ACUTE PAIN

## 2021-06-09 ASSESSMENT — PAIN DESCRIPTION - LOCATION: LOCATION: THROAT

## 2021-06-09 NOTE — PROGRESS NOTES
04/19/2012 308 140 - 450 k/uL Final     Platelets   Date Value Ref Range Status   06/08/2021 134 (L) 140 - 450 k/uL Final   06/01/2021 134 (L) 140 - 450 k/uL Final   05/25/2021 137 (L) 140 - 450 k/uL Final     CREATININE   Date Value Ref Range Status   06/08/2021 0.46 (L) 0.70 - 1.20 mg/dL Final   06/01/2021 0.43 (L) 0.70 - 1.20 mg/dL Final   05/25/2021 0.44 (L) 0.70 - 1.20 mg/dL Final       MEDICATIONS:    Current Outpatient Medications:     Magic Mouthwash (MIRACLE MOUTHWASH), Swish and spit 5 mLs 4 times daily as needed for Irritation, Disp: , Rfl:     lidocaine-prilocaine (EMLA) 2.5-2.5 % cream, Apply topically 45-60 mins prior to needle poke daily PRN, Disp: 1 Tube, Rfl: 2    prochlorperazine (COMPAZINE) 10 MG tablet, Take 1 tablet by mouth every 6 hours as needed (nausea vomiting), Disp: 120 tablet, Rfl: 3    ondansetron (ZOFRAN-ODT) 8 MG TBDP disintegrating tablet, Place 1 tablet under the tongue 3 times daily as needed for Nausea or Vomiting, Disp: 90 tablet, Rfl: 2    dexamethasone (DECADRON) 4 MG tablet, Take 5 tabs (20 mg) 12 and 6 hours before scheduled PACLItaxel (Taxol) infusion. , Disp: 30 tablet, Rfl: 2    traZODone (DESYREL) 50 MG tablet, Take 1 tablet by mouth nightly, Disp: 90 tablet, Rfl: 1    lactulose (CHRONULAC) 10 GM/15ML solution, Take 15 mLs by mouth 4 times daily as needed, Disp: , Rfl:     potassium chloride (KLOR-CON M) 20 MEQ extended release tablet, Take 1 tablet by mouth daily for 7 days (Patient not taking: Reported on 5/4/2021), Disp: 7 tablet, Rfl: 0    calcium carbonate 600 MG TABS tablet, take 1 tablet by mouth once daily, Disp: , Rfl:     D3-50 1.25 MG (78699 UT) CAPS, once a week On Tuesdays, Disp: , Rfl:     hydrOXYzine (VISTARIL) 25 MG capsule, take 1 capsule by mouth three times a day if needed for anxiety, Disp: 90 capsule, Rfl: 2    atorvastatin (LIPITOR) 20 MG tablet, take 1 tablet by mouth at bedtime, Disp: 90 tablet, Rfl: 1    spironolactone (ALDACTONE) 50 MG tablet, take 1 tablet by mouth once daily, Disp: 90 tablet, Rfl: 3    vitamin D (ERGOCALCIFEROL) 1.25 MG (63211 UT) CAPS capsule, take 1 capsule by mouth every week, Disp: 12 capsule, Rfl: 1    nadolol (CORGARD) 20 MG tablet, Take 1 tablet by mouth daily, Disp: 30 tablet, Rfl: 3    rifaximin (XIFAXAN) 550 MG tablet, Take 1 tablet by mouth 3 times daily, Disp: 42 tablet, Rfl: 0    pantoprazole (PROTONIX) 40 MG tablet, Take 1 tablet by mouth every morning (before breakfast), Disp: 30 tablet, Rfl: 3    sildenafil (VIAGRA) 100 MG tablet, Take 1 tablet by mouth as needed for Erectile Dysfunction, Disp: 10 tablet, Rfl: 3      ASSESSMENT PLAN:   He is having increasing discomfort swallowing. We have contacted the medical oncology office regarding possible pain medications for him. He will continue treatment as planned. He will finish treatment next week. Treatment setup and plan reviewed. Port images/CBCT images reviewed. Appropriate laboratory work was reviewed. Treatment side effects and toxicities reviewed with the patient, and appropriate management was advised. Will continue radiation treatment as planned, and recommend patient contact us if they have any questions or concerns. Electronically signed by Bita Yip MD on 6/9/2021 at 2:09 PM      Drugs Prescribed:  New Prescriptions    No medications on file       Other Orders Placed:  No orders of the defined types were placed in this encounter.

## 2021-06-09 NOTE — PROGRESS NOTES
Meggan Aleman  6/9/2021  Wt Readings from Last 3 Encounters:   06/09/21 219 lb (99.3 kg)   06/08/21 214 lb 12.8 oz (97.4 kg)   06/02/21 219 lb (99.3 kg)     Body mass index is 31.42 kg/m². Treatment Area:esophageal     Patient was seen today for weekly visit. Comfort Alteration  Fatigue: Mild    Ventilation Alterations  Cough: No  Hemoptysis: No  Mucus Color: n/a  Dyspnea: No      Nutritional Alteration  Anorexia: Yes  Nausea: No   Vomiting: No     Mucous Membrane Alteration  Voice Changes/ Stridor/Larynx: no  Pharynx & Esophagus: dysphagia     Elimination Alterations  Constipation: no  Diarrhea:  no    Skin Alteration   Sensation:intact    Radiation Dermatitis:  Intact [x]     Erythema  [x]     Discoloration  []     Rash []     Dry desquamation  []     Moist desquamation []       Emotional  Coping: effective      Injury, potential bleeding or infection: n/a     Lab Results   Component Value Date    WBC 1.9 (L) 06/08/2021     (L) 06/08/2021         BP (!) 160/103 Comment: pt reports he didnt take BP medication today   Pulse 100   Temp 97.8 °F (36.6 °C)   Resp 18   Wt 219 lb (99.3 kg)   SpO2 97%   BMI 31.42 kg/m²   Patient Currently in Pain: Yes  Pain Assessment: 0-10  Pain Level: 10         Assessment/Plan: Patient was seen today for weekly visit. Patient reports pain as severe in his throat with swallowing. He is using MMW to help cpntrol pain but it is short lived. Dr Dinorah Noonan unable to prescribe narcotics. Writer [facility] to see if Dr Garrison Martin would be able to call a medication in for pt. Dr Dinorah Noonan notified and examined pt.        Moshe Nissen, RN

## 2021-06-09 NOTE — TELEPHONE ENCOUNTER
Per Maria A De Jesus RN, pt c/o pain in his throat. He is requesting pain medication. Pt unable to swallow pills comfortably. Spoke with Dr. Toni Machado and he states to route RX for norco liquid if not able to swallow pills. RX routed and [employer] to notify pt.

## 2021-06-10 ENCOUNTER — HOSPITAL ENCOUNTER (OUTPATIENT)
Dept: RADIATION ONCOLOGY | Age: 61
Discharge: HOME | End: 2021-06-10
Attending: RADIOLOGY
Payer: MEDICARE

## 2021-06-10 PROCEDURE — 77427 RADIATION TX MANAGEMENT X5: CPT | Performed by: RADIOLOGY

## 2021-06-10 PROCEDURE — 77386 HC NTSTY MODUL RAD TX DLVR CPLX: CPT | Performed by: RADIOLOGY

## 2021-06-10 PROCEDURE — 77014 PR CT GUIDANCE PLACEMENT RAD THERAPY FIELDS: CPT | Performed by: RADIOLOGY

## 2021-06-11 ENCOUNTER — HOSPITAL ENCOUNTER (OUTPATIENT)
Dept: RADIATION ONCOLOGY | Age: 61
Discharge: HOME | End: 2021-06-11
Attending: STUDENT IN AN ORGANIZED HEALTH CARE EDUCATION/TRAINING PROGRAM
Payer: MEDICARE

## 2021-06-11 PROCEDURE — 77014 PR CT GUIDANCE PLACEMENT RAD THERAPY FIELDS: CPT | Performed by: STUDENT IN AN ORGANIZED HEALTH CARE EDUCATION/TRAINING PROGRAM

## 2021-06-11 PROCEDURE — 77386 HC NTSTY MODUL RAD TX DLVR CPLX: CPT | Performed by: STUDENT IN AN ORGANIZED HEALTH CARE EDUCATION/TRAINING PROGRAM

## 2021-06-14 ENCOUNTER — HOSPITAL ENCOUNTER (OUTPATIENT)
Dept: RADIATION ONCOLOGY | Age: 61
Discharge: HOME | End: 2021-06-14
Attending: RADIOLOGY
Payer: MEDICARE

## 2021-06-14 PROCEDURE — 77386 HC NTSTY MODUL RAD TX DLVR CPLX: CPT | Performed by: RADIOLOGY

## 2021-06-14 PROCEDURE — 77014 PR CT GUIDANCE PLACEMENT RAD THERAPY FIELDS: CPT | Performed by: RADIOLOGY

## 2021-06-15 ENCOUNTER — HOSPITAL ENCOUNTER (OUTPATIENT)
Dept: RADIATION ONCOLOGY | Age: 61
Discharge: HOME | End: 2021-06-15
Attending: RADIOLOGY
Payer: MEDICARE

## 2021-06-15 VITALS
DIASTOLIC BLOOD PRESSURE: 82 MMHG | OXYGEN SATURATION: 96 % | TEMPERATURE: 98 F | SYSTOLIC BLOOD PRESSURE: 126 MMHG | RESPIRATION RATE: 16 BRPM | HEART RATE: 110 BPM

## 2021-06-15 PROCEDURE — 77014 PR CT GUIDANCE PLACEMENT RAD THERAPY FIELDS: CPT | Performed by: RADIOLOGY

## 2021-06-15 PROCEDURE — 77386 HC NTSTY MODUL RAD TX DLVR CPLX: CPT | Performed by: RADIOLOGY

## 2021-06-16 NOTE — PROGRESS NOTES
[facility]            Radiation Oncology          [address] [facility]          [place] [place], [address]        Osbaldo Merrillsant: [phone]        F: [phone]       [email or web address]             RADIATION ONCOLOGY WEEKLY PROGRESS NOTE  Patient ID:   Desirae Dorsey  : 1960   MRN: [id number]    DIAGNOSIS:  Cancer Staging  Malignant neoplasm of lower third of esophagus ([address]  Staging form: Esophagus - Adenocarcinoma, AJCC 8th Edition  - Clinical: Stage IIB (cT2, cN0, cM0) - Signed by Clifford Bryan MD on 3/18/2021      TREATMENT DETAILS:  Treatment Site: Esophagus   actual Dose: 5040 cGy  Total Planned Dose: 5040 cGy  Treatment Technique: IMRT  Fraction Technique: Daily  Therapy imaging monitoring: CBCT daily  Concurrent Chemotherapy: weekly carbo/taxol    SUBJECTIVE:   Patient seen for their weekly on treatment evaluation today. He denies any further discomfort with swallowing and is now eating a mostly regular diet. His energy is low but he is otherwise doing well and denies any symptoms of nausea or pain today. Patient finishes radiation treatments today. OBJECTIVE:   CHAPERONE: Family/friend/companieon Present    ECO Symptomatic but completely ambulatory    VITAL SIGNS: /82   Pulse 110   Temp 98 °F (36.7 °C)   Resp 16   SpO2 96%   Wt Readings from Last 5 Encounters:   21 219 lb (99.3 kg)   21 214 lb 12.8 oz (97.4 kg)   21 219 lb (99.3 kg)   21 219 lb (99.3 kg)   21 219 lb 12.8 oz (99.7 kg)     GENERAL:  General appearance is that of a well-nourished, well-developed in no apparent distress. ABDOMEN: Soft nontender nondistended.     LABS:  WBC   Date Value Ref Range Status   2021 1.9 (L) 3.5 - 11.0 k/uL Final   2021 1.3 (LL) 3.5 - 11.0 k/uL Final   2021 2.2 (L) 3.5 - 11.0 k/uL Final     Segs Absolute   Date Value Ref Range Status   2021 1.71 (L) 1.8 - 7.7 k/uL Final   2021 0.81 (L) 1.8 - 7.7 k/uL Final   2021 2. 05 1.8 - 7.7 k/uL Final     Hemoglobin   Date Value Ref Range Status   06/08/2021 9.8 (L) 13.5 - 17.5 g/dL Final   06/01/2021 10.5 (L) 13.5 - 17.5 g/dL Final   05/25/2021 11.4 (L) 13.5 - 17.5 g/dL Final     Platelet Count   Date Value Ref Range Status   04/19/2012 308 140 - 450 k/uL Final     Platelets   Date Value Ref Range Status   06/08/2021 134 (L) 140 - 450 k/uL Final   06/01/2021 134 (L) 140 - 450 k/uL Final   05/25/2021 137 (L) 140 - 450 k/uL Final     CREATININE   Date Value Ref Range Status   06/08/2021 0.46 (L) 0.70 - 1.20 mg/dL Final   06/01/2021 0.43 (L) 0.70 - 1.20 mg/dL Final   05/25/2021 0.44 (L) 0.70 - 1.20 mg/dL Final     No results found for: , CEA  PSA   Date Value Ref Range Status   03/23/2017 0.44 <4.1 ug/L Final     Comment:     The Roche \"ECLIA\" assay is used. Results obtained with different assay methods   cannot be used interchangeably. Performed at [address] [phone]     04/19/2012 0.54 0 - 4 ug/L Final     Comment:     Performed at [address], [place] [address] [phone]       MEDICATIONS:    Current Outpatient Medications:     HYDROcodone-acetaminophen (LORTAB)  MG per 15ML SOLN solution, Take 5 mLs by mouth every 6 hours as needed for Pain for up to 14 days. , Disp: 280 mL, Rfl: 0    Magic Mouthwash (MIRACLE MOUTHWASH), Swish and spit 5 mLs 4 times daily as needed for Irritation, Disp: , Rfl:     lidocaine-prilocaine (EMLA) 2.5-2.5 % cream, Apply topically 45-60 mins prior to needle poke daily PRN, Disp: 1 Tube, Rfl: 2    prochlorperazine (COMPAZINE) 10 MG tablet, Take 1 tablet by mouth every 6 hours as needed (nausea vomiting), Disp: 120 tablet, Rfl: 3    ondansetron (ZOFRAN-ODT) 8 MG TBDP disintegrating tablet, Place 1 tablet under the tongue 3 times daily as needed for Nausea or Vomiting, Disp: 90 tablet, Rfl: 2    dexamethasone (DECADRON) 4 MG tablet, Take 5 tabs (20 mg) 12 and 6 hours before scheduled PACLItaxel (Taxol) infusion. , Disp: 30 tablet, Rfl: 2    traZODone (DESYREL) 50 MG tablet, Take 1 tablet by mouth nightly, Disp: 90 tablet, Rfl: 1    lactulose (CHRONULAC) 10 GM/15ML solution, Take 15 mLs by mouth 4 times daily as needed, Disp: , Rfl:     potassium chloride (KLOR-CON M) 20 MEQ extended release tablet, Take 1 tablet by mouth daily for 7 days (Patient not taking: Reported on 5/4/2021), Disp: 7 tablet, Rfl: 0    calcium carbonate 600 MG TABS tablet, take 1 tablet by mouth once daily, Disp: , Rfl:     D3-50 1.25 MG (97147 UT) CAPS, once a week On Tuesdays, Disp: , Rfl:     hydrOXYzine (VISTARIL) 25 MG capsule, take 1 capsule by mouth three times a day if needed for anxiety, Disp: 90 capsule, Rfl: 2    atorvastatin (LIPITOR) 20 MG tablet, take 1 tablet by mouth at bedtime, Disp: 90 tablet, Rfl: 1    spironolactone (ALDACTONE) 50 MG tablet, take 1 tablet by mouth once daily, Disp: 90 tablet, Rfl: 3    vitamin D (ERGOCALCIFEROL) 1.25 MG (53793 UT) CAPS capsule, take 1 capsule by mouth every week, Disp: 12 capsule, Rfl: 1    nadolol (CORGARD) 20 MG tablet, Take 1 tablet by mouth daily, Disp: 30 tablet, Rfl: 3    rifaximin (XIFAXAN) 550 MG tablet, Take 1 tablet by mouth 3 times daily, Disp: 42 tablet, Rfl: 0    pantoprazole (PROTONIX) 40 MG tablet, Take 1 tablet by mouth every morning (before breakfast), Disp: 30 tablet, Rfl: 3    sildenafil (VIAGRA) 100 MG tablet, Take 1 tablet by mouth as needed for Erectile Dysfunction, Disp: 10 tablet, Rfl: 3      ASSESSMENT PLAN:     Treatment setup and plan reviewed. Port images/CBCT images reviewed. Appropriate laboratory work was reviewed. Treatment side effects and toxicities reviewed with the patient, and appropriate management was advised. Patient completed radiation treatment as planned, and recommend patient contact us if they have any questions or concerns.   Patient is encouraged to continue using his pain medication and Protonix as needed and will be recommended to follow-up with his surgeons at [facility] as scheduled. Patient will see us in approximately 1 month for follow-up visit. Electronically signed by Anne Marie Vigil MD on 6/16/2021 at 8:14 AM      Drugs Prescribed:  New Prescriptions    No medications on file       Other Orders Placed:  No orders of the defined types were placed in this encounter.

## 2021-06-17 PROCEDURE — 77427 RADIATION TX MANAGEMENT X5: CPT | Performed by: RADIOLOGY

## 2021-06-21 NOTE — PROGRESS NOTES
month for a follow up visit and to assess treatment toxicity and response. Patient was advised to continue close follow up with medical oncology and surgical oncology at  as well. Patient does have our contact information in case they have any questions or concerns in the interim.     Electronically signed by Shy Ocampo MD on 6/21/2021 at 6:32 PM

## 2021-06-23 ENCOUNTER — TELEPHONE (OUTPATIENT)
Dept: ONCOLOGY | Age: 61
End: 2021-06-23

## 2021-06-23 NOTE — TELEPHONE ENCOUNTER
Name: Debra Villarreal  : 1960  MRN: [id number]    Oncology Navigation Follow-Up Note    Contact Type:  Telephone    Notes: Writer called patient to check on him and spoke to [place] pt hippa. She states shes doing OK but has periods of nausea. She does states that he does have medication for nausea and it seems to work, otherwise he is recovering well from concurrent chemo/RT. She states that pt has testing and appt at [facility] on  and . She states they should find out at latter appt if he qualifies to have esophagectomy. Writer informed her to call me if needed, if not Id f/u with them after their appt at [facility] to get plan. Joesph Fish was appreciative of call. Will continue to follow.     Electronically signed by Tristan Abad RN on 2021 at 10:01 AM

## 2021-06-24 ENCOUNTER — OFFICE VISIT (OUTPATIENT)
Dept: ONCOLOGY | Age: 61
End: 2021-06-24
Payer: MEDICARE

## 2021-06-24 ENCOUNTER — TELEPHONE (OUTPATIENT)
Dept: ONCOLOGY | Age: 61
End: 2021-06-24

## 2021-06-24 VITALS
BODY MASS INDEX: 31.11 KG/M2 | RESPIRATION RATE: 18 BRPM | WEIGHT: 216.8 LBS | DIASTOLIC BLOOD PRESSURE: 79 MMHG | SYSTOLIC BLOOD PRESSURE: 114 MMHG | HEART RATE: 99 BPM | TEMPERATURE: 98.7 F

## 2021-06-24 DIAGNOSIS — C15.5 MALIGNANT NEOPLASM OF LOWER THIRD OF ESOPHAGUS (HCC): ICD-10-CM

## 2021-06-24 PROCEDURE — G8417 CALC BMI ABV UP PARAM F/U: HCPCS | Performed by: INTERNAL MEDICINE

## 2021-06-24 PROCEDURE — 99214 OFFICE O/P EST MOD 30 MIN: CPT | Performed by: INTERNAL MEDICINE

## 2021-06-24 PROCEDURE — 1036F TOBACCO NON-USER: CPT | Performed by: INTERNAL MEDICINE

## 2021-06-24 PROCEDURE — 99211 OFF/OP EST MAY X REQ PHY/QHP: CPT | Performed by: INTERNAL MEDICINE

## 2021-06-24 PROCEDURE — G8427 DOCREV CUR MEDS BY ELIG CLIN: HCPCS | Performed by: INTERNAL MEDICINE

## 2021-06-24 PROCEDURE — 3017F COLORECTAL CA SCREEN DOC REV: CPT | Performed by: INTERNAL MEDICINE

## 2021-06-24 RX ORDER — OMEPRAZOLE 40 MG/1
40 CAPSULE, DELAYED RELEASE ORAL
Qty: 30 CAPSULE | Refills: 2 | Status: SHIPPED | OUTPATIENT
Start: 2021-06-24 | End: 2021-11-12

## 2021-06-24 NOTE — TELEPHONE ENCOUNTER
Per avs,     Rv in Mid August with labs/appt scheduled for 8/19/2021 @ 1:15. *pt left without checking out, avs and schedule mailed to patient.       Electronically signed by Monalisa Gonzalez on 6/24/2021 at 3:50 PM

## 2021-06-24 NOTE — PROGRESS NOTES
surgery Dr. Pallavi Marcelino on July 30. During this visit patient's allergy, social, medical, surgical history and medications were reviewed and updated.     Past Medical History:   Diagnosis Date    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     Alcohol abuse     6-12 beers a day; quit drinking July 2016    Arthritis     Back pain, chronic     dr. Alee Orozco, orthopedic, every 3-4 months, gets steroid injection    Lezama esophagus     BPH (benign prostatic hypertrophy)     Cholelithiasis     Cirrhosis ([facility]     COVID-19 12/2020    pt reports he tested + while at [facility] DDD (degenerative disc disease), lumbar     Esophageal varices ([facility]     Fatty liver     GERD (gastroesophageal reflux disease)     Hep C w/o coma, chronic ([facility]     History of blood transfusion     History of colon polyps 2016    [place] (hard of hearing)     Hyperlipidemia     Hypertension     Stomach ulcer     hx of    Tobacco abuse     Tubular adenoma of colon 2016, 2018    Wears glasses        Past Surgical History:   Procedure Laterality Date    BUNIONECTOMY      twice on right side    BUNIONECTOMY Left     CARPAL TUNNEL RELEASE Right     COLONOSCOPY      at age 35    COLONOSCOPY  10/05/2016    polyps-pathology tubular adenoma, and abnormal looking mucosa right colon-pathology-tubular adenoma    COLONOSCOPY N/A 3/30/2018    COLONOSCOPY POLYPECTOMY COLD BIOPSY performed by Freddy Martinez MD at [place] [address]  03/30/2018    Small polyp in the sigmoid colon and excised with biopsy forceps--tubular adenoma    ENDOSCOPY, COLON, DIAGNOSTIC      EGD    IR PORT PLACEMENT EQUAL OR GREATER THAN 5 YEARS  4/19/2021    IR PORT PLACEMENT EQUAL OR GREATER THAN 5 YEARS 4/19/2021 [facility]    KNEE SURGERY Left     cyst removed    NASAL SEPTUM SURGERY      NERVE BLOCK Right 11/23/2020    NERVE BLOCK RIGHT CERVICAL STEROID INJECTION  C3-C6 performed by Shelbi Sumner MD at [facility] 6  01/04/16 steroid injection C7 T1    OTHER SURGICAL HISTORY  11/21/2016    Bilateral Lumbar CACHORRO L4-L5 injections    OTHER SURGICAL HISTORY  12/19/2016    lumbar steroid injection    OTHER SURGICAL HISTORY  09/28/2018    BILATERAL L5 CACHORRO (N/A Back)    OTHER SURGICAL HISTORY Right 11/23/2020    cervical injection    PAIN MANAGEMENT PROCEDURE Left 7/9/2020    EPIDURAL STEROID INJECTION LEFT L4 L5 performed by Ash Ryan MD at [place] [address] Left 7/20/2020    LEFT L4 L5 EPIDURAL STEROID INJECTION performed by Ash Ryan MD at [place] [address] Bilateral 8/17/2020    LUMBAR FACET BILATERAL L2-L5 performed by Ash Ryan MD at [place] [address] Bilateral 12/7/2020    NERVE BLOCK BILATERAL LUMBAR MEDIAL BRANCH L2-L5 performed by Ash Ryan MD at [address] AA&/STRD TFRML EPI LUMBAR/SACRAL 1 LEVEL Bilateral 9/6/2018    BILATERAL L5 CACHORRO performed by Ash Ryan MD at [address] AA&/STRD TFRML EPI LUMBAR/SACRAL 1 LEVEL N/A 9/28/2018    BILATERAL L5 CACHORRO performed by Ash Ryan MD at [facility] N/CARPAL TUNNEL SURG Right 8/29/2017    CARPAL TUNNEL RELEASE RIGHT performed by Aleah Rodriguez MD at [facility] N/CARPAL TUNNEL SURG Left 10/31/2017    CARPAL TUNNEL RELEASE performed by Aleah Rodriguez MD at [address] [place] [address] 12/29/2020    EGD BIOPSY performed by Lilibeth Berry MD at [address] [place] [address] 2/2/2021    EGD BIOPSY and spot marking performed by Jovanny Loya MD at [address] [place] [address] N/A 2/12/2021    ENDOSCOPIC ULTRASOUND, EGD performed by Vero Gutiérrez MD at [address] [place] [address]  2/12/2021    EGD DIAGNOSTIC ONLY performed by Vero Gutiérrez MD at [address] [place] [address]       Allergies   Allergen Reactions    Pollen Extract      Runny nose, watery eyes       Current Outpatient Medications   Medication Sig Dispense Refill    Magic Mouthwash (MIRACLE MOUTHWASH) Swish and spit 5 mLs 4 times daily as needed for Irritation      lidocaine-prilocaine (EMLA) 2.5-2.5 % cream Apply topically 45-60 mins prior to needle poke daily PRN 1 Tube 2    prochlorperazine (COMPAZINE) 10 MG tablet Take 1 tablet by mouth every 6 hours as needed (nausea vomiting) 120 tablet 3    ondansetron (ZOFRAN-ODT) 8 MG TBDP disintegrating tablet Place 1 tablet under the tongue 3 times daily as needed for Nausea or Vomiting 90 tablet 2    dexamethasone (DECADRON) 4 MG tablet Take 5 tabs (20 mg) 12 and 6 hours before scheduled PACLItaxel (Taxol) infusion.  30 tablet 2    traZODone (DESYREL) 50 MG tablet Take 1 tablet by mouth nightly 90 tablet 1    lactulose (CHRONULAC) 10 GM/15ML solution Take 15 mLs by mouth 4 times daily as needed      calcium carbonate 600 MG TABS tablet take 1 tablet by mouth once daily      D3-50 1.25 MG (99936 UT) CAPS once a week On Tuesdays      hydrOXYzine (VISTARIL) 25 MG capsule take 1 capsule by mouth three times a day if needed for anxiety 90 capsule 2    atorvastatin (LIPITOR) 20 MG tablet take 1 tablet by mouth at bedtime 90 tablet 1    spironolactone (ALDACTONE) 50 MG tablet take 1 tablet by mouth once daily 90 tablet 3    vitamin D (ERGOCALCIFEROL) 1.25 MG (25220 UT) CAPS capsule take 1 capsule by mouth every week 12 capsule 1    nadolol (CORGARD) 20 MG tablet Take 1 tablet by mouth daily 30 tablet 3    rifaximin (XIFAXAN) 550 MG tablet Take 1 tablet by mouth 3 times daily 42 tablet 0    pantoprazole (PROTONIX) 40 MG tablet Take 1 tablet by mouth every morning (before breakfast) 30 tablet 3    sildenafil (VIAGRA) 100 MG tablet Take 1 tablet by mouth as needed for Erectile Dysfunction 10 tablet 3    potassium chloride (KLOR-CON M) 20 MEQ extended release tablet Take 1 tablet by mouth daily for 7 days (Patient not taking: Reported on 2021) 7 tablet 0     No current facility-administered medications for this visit. Social History     Socioeconomic History    Marital status: Single     Spouse name: Not on file    Number of children: Not on file    Years of education: Not on file    Highest education level: Not on file   Occupational History    Not on file   Tobacco Use    Smoking status: Former Smoker     Packs/day: 1.00     Years: 45.00     Pack years: 45.00     Quit date: 2017     Years since quittin.4    Smokeless tobacco: Never Used   Vaping Use    Vaping Use: Never used   Substance and Sexual Activity    Alcohol use: No     Comment:     Drug use: No     Frequency: 1.0 times per week     Comment: cocaine,  stopped spring 2016    Sexual activity: Yes     Partners: Female   Other Topics Concern    Not on file   Social History Narrative     in the past, retired     Social Determinants of Health     Financial Resource Strain: [address] Difficulty of Paying Living Expenses: Not hard at all   Food Insecurity: No Food Insecurity    Worried About [address] in the Last Year: Never true   [address] in the Last Year: Never true   Transportation Needs: No Transportation Needs    Lack of Transportation (Medical): No    Lack of Transportation (Non-Medical):  No   Physical Activity:     Days of Exercise per Week:     Minutes of Exercise per Session:    Stress:     Feeling of Stress :    Social Connections:     Frequency of Communication with Friends and Family:     Frequency of Social Gatherings with Friends and Family:     Attends Zoroastrian Services:     Active Member of Clubs or Organizations:     Attends Club or Organization Meetings:     Marital Status:    Intimate Partner Violence:     Fear of Current or Ex-Partner:     Emotionally Abused:     Physically Abused:     Sexually Abused:        Family History   Problem Relation Age of Onset    Cancer Mother         pancreatic  Cancer Father         bone    Diabetes Sister     Asthma Brother         REVIEW OF SYSTEM:     Constitutional: No fever or chills. No night sweats, no weight loss   Eyes: No eye discharge, double vision, or eye pain   HEENT: negative for sore mouth, sore throat, hoarseness and voice change   Respiratory: negative for cough , sputum, dyspnea, wheezing, hemoptysis, chest pain   Cardiovascular: negative for chest pain, dyspnea, palpitations, orthopnea, PND   Gastrointestinal: negative for nausea, vomiting, diarrhea, constipation, abdominal pain, Dysphagia, hematemesis and hematochezia   Genitourinary: negative for frequency, dysuria, nocturia, urinary incontinence, and hematuria   Integument: negative for rash, skin lesions, bruises. Hematologic/Lymphatic: negative for easy bruising, bleeding, lymphadenopathy, petechiae and swelling/edema   Endocrine: negative for heat or cold intolerance, tremor, weight changes, change in bowel habits and hair loss   Musculoskeletal: negative for myalgias, arthralgias, pain, joint swelling,and bone pain   Neurological: negative for headaches, dizziness, seizures, weakness, numbness       OBJECTIVE:         There were no vitals filed for this visit.     PHYSICAL EXAM:   General appearance - well appearing, no in pain or distress   Mental status - alert and cooperative   Eyes - pupils equal and reactive, extraocular eye movements intact   Ears - bilateral TM's and external ear canals normal   Mouth - mucous membranes moist, pharynx normal without lesions   Neck - supple, no significant adenopathy   Lymphatics - no palpable lymphadenopathy, no hepatosplenomegaly   Chest - clear to auscultation, no wheezes, rales or rhonchi, symmetric air entry   Heart - normal rate, regular rhythm, normal S1, S2, no murmurs, rubs, clicks or gallops   Abdomen - soft, nontender, nondistended, no masses or organomegaly   Neurological - alert, oriented, normal speech, no focal findings or movement and side effects of chemoRT. Now he has completed chemoradiation  During today's visit, the patient and the family had a number of reasonable questions which were answered to their satisfaction. They verbalized understanding of the information provided and they agreed to proceed as outlined above. PLAN:   I reviewed the treatment plan with patient and family  He has completed chemoradiation  I will increase the dose of Prilosec to 40 mg  Prescription for Lortab liquid was given  Continue Magic mouthwash  Patient planning to have restaging scans with you from on July 21 and follow-up with thoracic surgery on July 30  Return to clinic in mid August with labs prior    Garrison Floyd MD  Hematologist/Medical Oncologist      This note is created with the assistance of a speech recognition program.  While intending to generate a document that actually reflects the content of the visit, the document can still have some errors including those of syntax and sound a like substitutions which may escape proof reading. It such instances, actual meaning can be extrapolated by contextual diversion.

## 2021-06-25 ENCOUNTER — TELEPHONE (OUTPATIENT)
Dept: INFUSION THERAPY | Age: 61
End: 2021-06-25

## 2021-06-25 NOTE — TELEPHONE ENCOUNTER
Received a fax requesting refill clarification on dose of magic mouthwash. Agreed with the 1:1:1 on the maalox/lidocaine/benadryl. Faxed answer back to the [facility] pharmacy on [address] in [place], [place]. correspondence was put in scan bin.

## 2021-06-30 ENCOUNTER — TELEPHONE (OUTPATIENT)
Dept: ONCOLOGY | Age: 61
End: 2021-06-30

## 2021-07-06 ENCOUNTER — OFFICE VISIT (OUTPATIENT)
Dept: PAIN MANAGEMENT | Age: 61
End: 2021-07-06

## 2021-07-06 ENCOUNTER — HOSPITAL ENCOUNTER (OUTPATIENT)
Age: 61
Setting detail: SPECIMEN
Discharge: HOME | End: 2021-07-06
Payer: MEDICARE

## 2021-07-06 VITALS
WEIGHT: 212.4 LBS | HEIGHT: 70 IN | BODY MASS INDEX: 30.41 KG/M2 | SYSTOLIC BLOOD PRESSURE: 134 MMHG | DIASTOLIC BLOOD PRESSURE: 95 MMHG

## 2021-07-06 DIAGNOSIS — D61.818 PANCYTOPENIA (HCC): ICD-10-CM

## 2021-07-06 DIAGNOSIS — M54.42 CHRONIC BILATERAL LOW BACK PAIN WITH LEFT-SIDED SCIATICA: Primary | Chronic | ICD-10-CM

## 2021-07-06 DIAGNOSIS — C15.5 MALIGNANT NEOPLASM OF LOWER THIRD OF ESOPHAGUS (HCC): ICD-10-CM

## 2021-07-06 DIAGNOSIS — M48.061 LUMBAR FORAMINAL STENOSIS: ICD-10-CM

## 2021-07-06 DIAGNOSIS — G89.29 CHRONIC BILATERAL LOW BACK PAIN WITH LEFT-SIDED SCIATICA: Primary | Chronic | ICD-10-CM

## 2021-07-06 LAB
ABSOLUTE EOS #: 0.3 K/UL (ref 0–0.44)
ABSOLUTE IMMATURE GRANULOCYTE: 0.03 K/UL (ref 0–0.3)
ABSOLUTE LYMPH #: 1.18 K/UL (ref 1.1–3.7)
ABSOLUTE MONO #: 1.3 K/UL (ref 0.1–1.2)
BASOPHILS # BLD: 1 % (ref 0–2)
BASOPHILS ABSOLUTE: 0.04 K/UL (ref 0–0.2)
DIFFERENTIAL TYPE: ABNORMAL
EOSINOPHILS RELATIVE PERCENT: 5 % (ref 1–4)
HCT VFR BLD CALC: 38.7 % (ref 40.7–50.3)
HEMOGLOBIN: 12.2 G/DL (ref 13–17)
IMMATURE GRANULOCYTES: 1 %
LYMPHOCYTES # BLD: 18 % (ref 24–43)
MCH RBC QN AUTO: 30.4 PG (ref 25.2–33.5)
MCHC RBC AUTO-ENTMCNC: 31.5 G/DL (ref 28.4–34.8)
MCV RBC AUTO: 96.5 FL (ref 82.6–102.9)
MONOCYTES # BLD: 20 % (ref 3–12)
NRBC AUTOMATED: 0 PER 100 WBC
PDW BLD-RTO: 18.9 % (ref 11.8–14.4)
PLATELET # BLD: ABNORMAL K/UL (ref 138–453)
PLATELET ESTIMATE: ABNORMAL
PLATELET, FLUORESCENCE: 113 K/UL (ref 138–453)
PLATELET, IMMATURE FRACTION: 2.7 % (ref 1.1–10.3)
PMV BLD AUTO: ABNORMAL FL (ref 8.1–13.5)
RBC # BLD: 4.01 M/UL (ref 4.21–5.77)
RBC # BLD: ABNORMAL 10*6/UL
SEG NEUTROPHILS: 56 % (ref 36–65)
SEGMENTED NEUTROPHILS ABSOLUTE COUNT: 3.59 K/UL (ref 1.5–8.1)
WBC # BLD: 6.4 K/UL (ref 3.5–11.3)
WBC # BLD: ABNORMAL 10*3/UL

## 2021-07-06 PROCEDURE — 99214 OFFICE O/P EST MOD 30 MIN: CPT | Performed by: ANESTHESIOLOGY

## 2021-07-06 ASSESSMENT — ENCOUNTER SYMPTOMS
RESPIRATORY NEGATIVE: 1
BACK PAIN: 1

## 2021-07-06 NOTE — PROGRESS NOTES
The patient is a 63 y. o. Non-/non  male. Chief Complaint   Patient presents with    Follow-up    Back Pain        HPI  34-year-old man with history of chronic axial lower back pain located in the lumbar area  Reports radiation of pain down left leg  Pain aggravated with standing and walking  Aggravates with lifting twisting bending  Associated symptoms include left leg numbness  No changes in bladder or bowel control  Pain is constant throbbing burning shooting sensation    MRI lumbar spine at shown no multilevel lumbar facet arthropathy and moderate L3-L4 level left-sided foraminal narrowing and severe L4-L5 level left-sided foraminal narrowing  Tried conservative measures in past including therapy and medication management    Patient had a lumbar epidural injection more than 6 months ago with the more than 50% improvement in symptoms with improved activity tolerance  Pain is now back to the baseline    In interim he has been diagnosed with esophageal cancer and completed radiation therapy and is planning for a surgical treatment now    Patient is here today to discuss getting set up for back injections. Pain level is 6/10 today and located in the lower. Pain is going down both legs and stops at the knee. Walking, standing and bending make the pain worse. Sitting helps the pain. The pain is aching. Patient states no weakness and no incontinence.      Past Medical History:   Diagnosis Date    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 ([address]     Alcohol abuse     6-12 beers a day; quit drinking July 2016    Arthritis     Back pain, chronic     dr. Layla Quezada, orthopedic, every 3-4 months, gets steroid injection    Lezama esophagus     BPH (benign prostatic hypertrophy)     Cholelithiasis     Cirrhosis ([address]     COVID-19 12/2020    pt reports he tested + while at [facility] DDD (degenerative disc disease), lumbar     Esophageal varices ([address]     Fatty liver     GERD (gastroesophageal reflux disease)  Hep C w/o coma, chronic (HCC)     History of blood transfusion     History of colon polyps 2016    [place] (hard of hearing)     Hyperlipidemia     Hypertension     Stomach ulcer     hx of    Tobacco abuse     Tubular adenoma of colon 2016, 2018    Wears glasses       Past Surgical History:   Procedure Laterality Date    BUNIONECTOMY      twice on right side    BUNIONECTOMY Left     CARPAL TUNNEL RELEASE Right     COLONOSCOPY      at age 35    COLONOSCOPY  10/05/2016    polyps-pathology tubular adenoma, and abnormal looking mucosa right colon-pathology-tubular adenoma    COLONOSCOPY N/A 3/30/2018    COLONOSCOPY POLYPECTOMY COLD BIOPSY performed by Shanita Lopez MD at [address] [place] [address]  03/30/2018    Small polyp in the sigmoid colon and excised with biopsy forceps--tubular adenoma    ENDOSCOPY, COLON, DIAGNOSTIC      EGD    IR PORT PLACEMENT EQUAL OR GREATER THAN 5 YEARS  4/19/2021    IR PORT PLACEMENT EQUAL OR GREATER THAN 5 YEARS 4/19/2021 [facility]    KNEE SURGERY Left     cyst removed    NASAL SEPTUM SURGERY      NERVE BLOCK Right 11/23/2020    NERVE BLOCK RIGHT CERVICAL STEROID INJECTION  C3-C6 performed by Tyson Shukla MD at [address] [place] [address]  01/04/16    steroid injection C7 T1    OTHER SURGICAL HISTORY  11/21/2016    Bilateral Lumbar CACHORRO L4-L5 injections    OTHER SURGICAL HISTORY  12/19/2016    lumbar steroid injection    OTHER SURGICAL HISTORY  09/28/2018    BILATERAL L5 CACHORRO (N/A Back)    OTHER SURGICAL HISTORY Right 11/23/2020    cervical injection    PAIN MANAGEMENT PROCEDURE Left 7/9/2020    EPIDURAL STEROID INJECTION LEFT L4 L5 performed by Tyson Shukla MD at [address] [place] [address] Left 7/20/2020    LEFT L4 L5 EPIDURAL STEROID INJECTION performed by Tyson Shukla MD at [address] [place] [address] Bilateral 8/17/2020    LUMBAR FACET BILATERAL L2-L5 performed by Tyson Shukla MD at [place] [address] MANAGEMENT PROCEDURE Bilateral 2020    NERVE BLOCK BILATERAL LUMBAR MEDIAL BRANCH L2-L5 performed by Peyman Zapata MD at [address] AA&/STRD TFRML EPI LUMBAR/SACRAL 1 LEVEL Bilateral 2018    BILATERAL L5 CACHORRO performed by Peyman Zapata MD at [address] AA&/STRD TFRML EPI LUMBAR/SACRAL 1 LEVEL N/A 2018    BILATERAL L5 CACHORRO performed by Peyman Zapata MD at [address] N/CARPAL TUNNEL SURG Right 2017    CARPAL TUNNEL RELEASE RIGHT performed by Erica Campo MD at [facility] N/CARPAL TUNNEL SURG Left 10/31/2017    CARPAL TUNNEL RELEASE performed by Erica Campo MD at [address] [place] [address] 2020    EGD BIOPSY performed by Ana Ding MD at [place] [address] N/A 2021    EGD BIOPSY and spot marking performed by Rahul Owen MD at [place] [address] N/A 2021    ENDOSCOPIC ULTRASOUND, EGD performed by Alan Dinero MD at [address] [place] [address]  2021    EGD DIAGNOSTIC ONLY performed by Alan Dinero MD at [address] [place] [address]       Social History     Socioeconomic History    Marital status: Single     Spouse name: None    Number of children: None    Years of education: None    Highest education level: None   Occupational History    None   Tobacco Use    Smoking status: Former Smoker     Packs/day: 1.00     Years: 45.00     Pack years: 45.00     Quit date: 2017     Years since quittin.4    Smokeless tobacco: Never Used   Vaping Use    Vaping Use: Never used   Substance and Sexual Activity    Alcohol use: No     Comment:     Drug use: No     Frequency: 1.0 times per week     Comment: cocaine,  stopped spring 2016    Sexual activity: Yes     Partners: Female   Other Topics Concern    None   Social History Narrative     in the past, retired     Social Determinants of Health     Financial Resource Strain: Low Risk     Difficulty of Paying Living Expenses: Not hard at all   Food Insecurity: No Food Insecurity    Worried About Running Out of Food in the Last Year: Never true    Marge of Food in the Last Year: Never true   Transportation Needs: No Transportation Needs    Lack of Transportation (Medical): No    Lack of Transportation (Non-Medical): No   Physical Activity:     Days of Exercise per Week:     Minutes of Exercise per Session:    Stress:     Feeling of Stress :    Social Connections:     Frequency of Communication with Friends and Family:     Frequency of Social Gatherings with Friends and Family:     Attends Religion Services:     Active Member of Clubs or Organizations:     Attends Club or Organization Meetings:     Marital Status:    Intimate Partner Violence:     Fear of Current or Ex-Partner:     Emotionally Abused:     Physically Abused:     Sexually Abused:      Family History   Problem Relation Age of Onset    Cancer Mother         pancreatic    Cancer Father         bone    Diabetes Sister     Asthma Brother      Allergies   Allergen Reactions    Pollen Extract      Runny nose, watery eyes     Pollen extract   Vitals:    07/06/21 1128   BP: (!) 134/95     Current Outpatient Medications   Medication Sig Dispense Refill    HYDROcodone-acetaminophen (LORTAB)  MG per 15ML SOLN solution Take 5 mLs by mouth every 6 hours as needed for Pain for up to 14 days.  280 mL 0    Magic Mouthwash (MIRACLE MOUTHWASH) Swish and spit 5 mLs 4 times daily as needed for Irritation 240 mL 2    omeprazole (PRILOSEC) 40 MG delayed release capsule Take 1 capsule by mouth every morning (before breakfast) 30 capsule 2    lidocaine-prilocaine (EMLA) 2.5-2.5 % cream Apply topically 45-60 mins prior to needle poke daily PRN 1 Tube 2    prochlorperazine (COMPAZINE) 10 MG tablet Take 1 tablet by mouth every 6 hours as needed (nausea vomiting) 120 tablet 3    ondansetron (ZOFRAN-ODT) 8 MG TBDP disintegrating tablet Place 1 tablet under the tongue 3 times daily as needed for Nausea or Vomiting 90 tablet 2    dexamethasone (DECADRON) 4 MG tablet Take 5 tabs (20 mg) 12 and 6 hours before scheduled PACLItaxel (Taxol) infusion. 30 tablet 2    traZODone (DESYREL) 50 MG tablet Take 1 tablet by mouth nightly 90 tablet 1    lactulose (CHRONULAC) 10 GM/15ML solution Take 15 mLs by mouth 4 times daily as needed      potassium chloride (KLOR-CON M) 20 MEQ extended release tablet Take 1 tablet by mouth daily for 7 days 7 tablet 0    calcium carbonate 600 MG TABS tablet take 1 tablet by mouth once daily      D3-50 1.25 MG (23382 UT) CAPS once a week On Tuesdays      hydrOXYzine (VISTARIL) 25 MG capsule take 1 capsule by mouth three times a day if needed for anxiety 90 capsule 2    atorvastatin (LIPITOR) 20 MG tablet take 1 tablet by mouth at bedtime 90 tablet 1    spironolactone (ALDACTONE) 50 MG tablet take 1 tablet by mouth once daily 90 tablet 3    vitamin D (ERGOCALCIFEROL) 1.25 MG (39888 UT) CAPS capsule take 1 capsule by mouth every week 12 capsule 1    nadolol (CORGARD) 20 MG tablet Take 1 tablet by mouth daily 30 tablet 3    rifaximin (XIFAXAN) 550 MG tablet Take 1 tablet by mouth 3 times daily 42 tablet 0    pantoprazole (PROTONIX) 40 MG tablet Take 1 tablet by mouth every morning (before breakfast) 30 tablet 3    sildenafil (VIAGRA) 100 MG tablet Take 1 tablet by mouth as needed for Erectile Dysfunction 10 tablet 3     No current facility-administered medications for this visit. Review of Systems   Constitutional: Positive for fatigue. Negative for fever. Respiratory: Negative. Musculoskeletal: Positive for back pain. Psychiatric/Behavioral: Negative. Objective:  General Appearance:  Uncomfortable and in pain. Vital signs: (most recent): Blood pressure (!) 134/95, height 5' 10\" (1.778 m), weight 212 lb 6.4 oz (96.3 kg). Vital signs are normal.  No fever. Output: Producing urine and producing stool. HEENT: Normal HEENT exam.    Lungs:  Normal effort and normal respiratory rate. Breath sounds clear to auscultation. He is not in respiratory distress. Heart: Normal rate. Extremities: Normal range of motion. There is no deformity. Neurological: Patient is alert and oriented to person, place and time. Patient has normal coordination. Pupils:  Pupils are equal, round, and reactive to light. Pupils are equal.   Skin:  Warm and dry. No rash or cyanosis. Assessment & Plan  1. Chronic bilateral low back pain with left-sided sciatica    2. Lumbar foraminal stenosis    3. Pancytopenia ([address]        Orders Placed This Encounter   Procedures    ND NJX AA&/STRD TFRML EPI LUMBAR/SACRAL 1 LEVEL      No orders of the defined types were placed in this encounter.      58-year-old man with history of chronic lower back pain  Presenting with flareup of back pain and left-sided sciatica  In past epidural injection have been helpful  Reports no progressive leg weakness no changes in bladder or bowel control I think epidural injection could be potentially helpful    Patient is recently diagnosed with esophageal carcinoma and underwent radiation therapy  Lab work-up have shown pancytopenia with the thrombocytopenia and leukopenia  Last CBC was a month ago  I will obtain new CBC  If his platelet count is above 100,000 and his white cell count is above 4000 then I will proceed with epidural injection otherwise we will avoid injection for risk of bleeding and infection          Electronically signed by Shaun Longo MD on 7/6/2021 at 12:23 PM

## 2021-07-12 ENCOUNTER — TELEPHONE (OUTPATIENT)
Dept: PAIN MANAGEMENT | Age: 61
End: 2021-07-12

## 2021-07-12 NOTE — TELEPHONE ENCOUNTER
Pt last seen on 7/6/21 and was instructed to complete CBC lab to check platelet count before scheduling CACHORRO.  Please review results and advise

## 2021-07-13 ENCOUNTER — APPOINTMENT (OUTPATIENT)
Dept: RADIATION ONCOLOGY | Age: 61
End: 2021-07-13
Attending: RADIOLOGY
Payer: MEDICARE

## 2021-07-14 NOTE — TELEPHONE ENCOUNTER
Attempted to return patient phone call and schedule procedure.  Pt did not answer so writer left a message requesting pt to call office back

## 2021-07-19 ENCOUNTER — TELEPHONE (OUTPATIENT)
Dept: RADIATION ONCOLOGY | Age: 61
End: 2021-07-19

## 2021-07-19 ENCOUNTER — OFFICE VISIT (OUTPATIENT)
Dept: PRIMARY CARE CLINIC | Age: 61
End: 2021-07-19
Payer: MEDICARE

## 2021-07-19 VITALS
DIASTOLIC BLOOD PRESSURE: 78 MMHG | WEIGHT: 211.2 LBS | OXYGEN SATURATION: 97 % | HEART RATE: 100 BPM | BODY MASS INDEX: 30.3 KG/M2 | SYSTOLIC BLOOD PRESSURE: 122 MMHG

## 2021-07-19 DIAGNOSIS — F32.A DEPRESSION, UNSPECIFIED DEPRESSION TYPE: Primary | ICD-10-CM

## 2021-07-19 DIAGNOSIS — F51.04 PSYCHOPHYSIOLOGIC INSOMNIA: ICD-10-CM

## 2021-07-19 PROCEDURE — 3017F COLORECTAL CA SCREEN DOC REV: CPT | Performed by: PHYSICIAN ASSISTANT

## 2021-07-19 PROCEDURE — G8427 DOCREV CUR MEDS BY ELIG CLIN: HCPCS | Performed by: PHYSICIAN ASSISTANT

## 2021-07-19 PROCEDURE — 99204 OFFICE O/P NEW MOD 45 MIN: CPT | Performed by: PHYSICIAN ASSISTANT

## 2021-07-19 PROCEDURE — 1036F TOBACCO NON-USER: CPT | Performed by: PHYSICIAN ASSISTANT

## 2021-07-19 PROCEDURE — G8417 CALC BMI ABV UP PARAM F/U: HCPCS | Performed by: PHYSICIAN ASSISTANT

## 2021-07-19 RX ORDER — TRAZODONE HYDROCHLORIDE 50 MG/1
50 TABLET ORAL NIGHTLY
Qty: 90 TABLET | Refills: 1 | Status: SHIPPED | OUTPATIENT
Start: 2021-07-19 | End: 2021-07-19

## 2021-07-19 RX ORDER — FLUOXETINE HYDROCHLORIDE 20 MG/1
20 CAPSULE ORAL DAILY
Qty: 30 CAPSULE | Refills: 3 | Status: SHIPPED | OUTPATIENT
Start: 2021-07-19 | End: 2021-11-12

## 2021-07-19 RX ORDER — ZOLPIDEM TARTRATE 5 MG/1
5 TABLET ORAL NIGHTLY PRN
Qty: 14 TABLET | Refills: 0 | Status: SHIPPED | OUTPATIENT
Start: 2021-07-19 | End: 2021-08-02

## 2021-07-19 ASSESSMENT — ENCOUNTER SYMPTOMS
COUGH: 0
VOMITING: 1
ABDOMINAL DISTENTION: 0
ABDOMINAL PAIN: 0
SINUS PRESSURE: 0
RHINORRHEA: 0
SORE THROAT: 0
EYE DISCHARGE: 0
SHORTNESS OF BREATH: 0
DIARRHEA: 0
CONSTIPATION: 0
PHOTOPHOBIA: 0
CHEST TIGHTNESS: 0
NAUSEA: 1
TROUBLE SWALLOWING: 1

## 2021-07-19 NOTE — PROGRESS NOTES
[facility] PRIMARY CARE  [address] [facility] [address]  Dept: [address] Maribell Jacobsen is a 63 y.o. male New patient, who presents today for his medical conditions/complaints as noted below. Chief Complaint   Patient presents with    New Patient     get established    Depression     Pt c/o depression and anxiety. Pt has cancer        HPI:     HPI: The patient is having depression. He used prozac which he is unsure if it worked or not. The patient is also having anxiety about his esophageal cancer. Has a PET scan and Reedi a thoracic surgeon for his cancer. The patient was given wellbutrin. He is still seeing pain management and has a procedure coming up on the second. He is still seeing Dr. Enrico Van. Has not seen him in awhile.      Reviewed prior notes None  Reviewed previous Labs    LDL Cholesterol (mg/dL)   Date Value   07/03/2020 103   02/02/2019 129   03/23/2017 87       (goal LDL is <100)   AST (U/L)   Date Value   06/08/2021 28     ALT (U/L)   Date Value   06/08/2021 14     BUN (mg/dL)   Date Value   06/08/2021 7 (L)     TSH (mIU/L)   Date Value   07/03/2020 3.35     BP Readings from Last 3 Encounters:   07/19/21 122/78   07/06/21 (!) 134/95   06/24/21 114/79          (goal 120/80)    Past Medical History:   Diagnosis Date    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 [facility]     Alcohol abuse     6-12 beers a day; quit drinking July 2016    Arthritis     Back pain, chronic     dr. Ele Lopez, orthopedic, every 3-4 months, gets steroid injection    Lezama esophagus     BPH (benign prostatic hypertrophy)     Cholelithiasis     Cirrhosis ([address]     COVID-19 12/2020    pt reports he tested + while at [facility] DDD (degenerative disc disease), lumbar     Esophageal varices ([address]     Fatty liver     GERD (gastroesophageal reflux disease)     Hep C w/o coma, chronic ([address]     History of blood transfusion     History of colon polyps 2016  [place] (hard of hearing)     Hyperlipidemia     Hypertension     Stomach ulcer     hx of    Tobacco abuse     Tubular adenoma of colon 2016, 2018    Wears glasses       Past Surgical History:   Procedure Laterality Date    BUNIONECTOMY      twice on right side    BUNIONECTOMY Left     CARPAL TUNNEL RELEASE Right     COLONOSCOPY      at age 35    COLONOSCOPY  10/05/2016    polyps-pathology tubular adenoma, and abnormal looking mucosa right colon-pathology-tubular adenoma    COLONOSCOPY N/A 3/30/2018    COLONOSCOPY POLYPECTOMY COLD BIOPSY performed by Sandra Hernández MD at [address] [place] [address]  03/30/2018    Small polyp in the sigmoid colon and excised with biopsy forceps--tubular adenoma    ENDOSCOPY, COLON, DIAGNOSTIC      EGD    IR PORT PLACEMENT EQUAL OR GREATER THAN 5 YEARS  4/19/2021    IR PORT PLACEMENT EQUAL OR GREATER THAN 5 YEARS 4/19/2021 [facility] PROCEDURES    KNEE SURGERY Left     cyst removed    NASAL SEPTUM SURGERY      NERVE BLOCK Right 11/23/2020    NERVE BLOCK RIGHT CERVICAL STEROID INJECTION  C3-C6 performed by Ezekiel Walters MD at [address] [place] [address]  01/04/16    steroid injection C7 T1    OTHER SURGICAL HISTORY  11/21/2016    Bilateral Lumbar CACHORRO L4-L5 injections    OTHER SURGICAL HISTORY  12/19/2016    lumbar steroid injection    OTHER SURGICAL HISTORY  09/28/2018    BILATERAL L5 CACHORRO (N/A Back)    OTHER SURGICAL HISTORY Right 11/23/2020    cervical injection    PAIN MANAGEMENT PROCEDURE Left 7/9/2020    EPIDURAL STEROID INJECTION LEFT L4 L5 performed by Ezekiel Walters MD at [facility] [address] Left 7/20/2020    LEFT L4 L5 EPIDURAL STEROID INJECTION performed by Ezekiel Walters MD at [facility] [address] Bilateral 8/17/2020    LUMBAR FACET BILATERAL L2-L5 performed by Ezekiel Walters MD at [facility] [address] Bilateral 12/7/2020    NERVE BLOCK BILATERAL LUMBAR MEDIAL BRANCH L2-L5 performed by Dorcas Puri MD at [address] AA&/STRD TFRML EPI LUMBAR/SACRAL 1 LEVEL Bilateral 2018    BILATERAL L5 CACHORRO performed by Dorcas Puri MD at [address] AA&/STRD TFRML EPI LUMBAR/SACRAL 1 LEVEL N/A 2018    BILATERAL L5 CACHORRO performed by Dorcas Puri MD at [address] N/CARPAL TUNNEL SURG Right 2017    CARPAL TUNNEL RELEASE RIGHT performed by Susana Anders MD at [address] N/CARPAL TUNNEL SURG Left 10/31/2017    CARPAL TUNNEL RELEASE performed by Susana Anders MD at [address] [place] [address] N/A 2020    EGD BIOPSY performed by Zohra Diego MD at [address] [place] [address] N/A 2021    EGD BIOPSY and spot marking performed by Josue Reyes MD at [address] [place] [address] N/A 2021    ENDOSCOPIC ULTRASOUND, EGD performed by Chai Sanabria MD at [address] [place] [address]  2021    EGD DIAGNOSTIC ONLY performed by Chai [facility]abria MD at [facility]    WISDOM TOOTH EXTRACTION         Family History   Problem Relation Age of Onset    Cancer Mother         pancreatic    Cancer Father         bone    Diabetes Sister     Asthma Brother        Social History     Tobacco Use    Smoking status: Former Smoker     Packs/day: 1.00     Years: 45.00     Pack years: 45.00     Quit date: 2017     Years since quittin.5    Smokeless tobacco: Never Used   Substance Use Topics    Alcohol use: No     Comment: 2019      Current Outpatient Medications   Medication Sig Dispense Refill    FLUoxetine (PROZAC) 20 MG capsule Take 1 capsule by mouth daily 30 capsule 3    zolpidem (AMBIEN) 5 MG tablet Take 1 tablet by mouth nightly as needed for Sleep for up to 14 days.  14 tablet 0    Magic Mouthwash (MIRACLE MOUTHWASH) Swish and spit 5 mLs 4 times daily as needed for Irritation 240 mL 2    omeprazole (PRILOSEC) 40 MG delayed release capsule Take 1 capsule by mouth every morning (before breakfast) 30 capsule 2    lidocaine-prilocaine (EMLA) 2.5-2.5 % cream Apply topically 45-60 mins prior to needle poke daily PRN 1 Tube 2    prochlorperazine (COMPAZINE) 10 MG tablet Take 1 tablet by mouth every 6 hours as needed (nausea vomiting) 120 tablet 3    ondansetron (ZOFRAN-ODT) 8 MG TBDP disintegrating tablet Place 1 tablet under the tongue 3 times daily as needed for Nausea or Vomiting 90 tablet 2    dexamethasone (DECADRON) 4 MG tablet Take 5 tabs (20 mg) 12 and 6 hours before scheduled PACLItaxel (Taxol) infusion. 30 tablet 2    D3-50 1.25 MG (19164 UT) CAPS once a week On Tuesdays      hydrOXYzine (VISTARIL) 25 MG capsule take 1 capsule by mouth three times a day if needed for anxiety 90 capsule 2    atorvastatin (LIPITOR) 20 MG tablet take 1 tablet by mouth at bedtime 90 tablet 1    spironolactone (ALDACTONE) 50 MG tablet take 1 tablet by mouth once daily 90 tablet 3    vitamin D (ERGOCALCIFEROL) 1.25 MG (49694 UT) CAPS capsule take 1 capsule by mouth every week 12 capsule 1    nadolol (CORGARD) 20 MG tablet Take 1 tablet by mouth daily 30 tablet 3    rifaximin (XIFAXAN) 550 MG tablet Take 1 tablet by mouth 3 times daily 42 tablet 0    sildenafil (VIAGRA) 100 MG tablet Take 1 tablet by mouth as needed for Erectile Dysfunction 10 tablet 3    lactulose (CHRONULAC) 10 GM/15ML solution Take 15 mLs by mouth 4 times daily as needed       No current facility-administered medications for this visit.      Allergies   Allergen Reactions    Pollen Extract      Runny nose, watery eyes       Health Maintenance   Topic Date Due    Diabetes screen  03/16/2020    COVID-19 Vaccine (2 - Moderna 2-dose series) 06/17/2021    Lipid screen  07/03/2021    Shingles Vaccine (1 of 2) 02/23/2022 (Originally 8/15/2009)    Flu vaccine (1) 09/01/2021    Low dose CT lung screening  04/08/2022    Potassium monitoring  06/08/2022    Creatinine monitoring  06/08/2022    Colon cancer screen colonoscopy  03/30/2023    Pneumococcal 0-64 years Vaccine (2 of 2 - PPSV23) 08/15/2024    DTaP/Tdap/Td vaccine (2 - Td or Tdap) 04/06/2027    Hepatitis A vaccine  Completed    Hepatitis B vaccine  Completed    Hib vaccine  Aged Out    Meningococcal (ACWY) vaccine  Aged Out    HIV screen  Discontinued       Subjective:      Review of Systems   Constitutional: Positive for activity change and fatigue. Negative for chills, fever and unexpected weight change. HENT: Positive for trouble swallowing. Negative for congestion, hearing loss, rhinorrhea, sinus pressure and sore throat. Eyes: Negative for photophobia, discharge and visual disturbance. Respiratory: Negative for cough, chest tightness and shortness of breath. Cardiovascular: Negative for chest pain, palpitations and leg swelling. Gastrointestinal: Positive for nausea and vomiting. Negative for abdominal distention, abdominal pain, constipation and diarrhea. Endocrine: Negative for polydipsia and polyuria. Genitourinary: Negative for decreased urine volume, difficulty urinating, frequency and urgency. Musculoskeletal: Negative for arthralgias, gait problem and myalgias. Skin: Negative for rash. Allergic/Immunologic: Negative for food allergies. Neurological: Negative for dizziness, weakness, numbness and headaches. Hematological: Negative for adenopathy. Psychiatric/Behavioral: Positive for dysphoric mood and sleep disturbance. Negative for behavioral problems, self-injury and suicidal ideas. The patient is nervous/anxious. Objective:     /78   Pulse 100   Wt 211 lb 3.2 oz (95.8 kg)   SpO2 97%   BMI 30.30 kg/m²   Physical Exam  Constitutional:       General: He is not in acute distress. Appearance: Normal appearance. He is not ill-appearing. HENT:      Head: Normocephalic and atraumatic.       Right Ear: External ear normal.      Left Ear: External ear normal.      Nose: Nose normal. Mouth/Throat:      Mouth: Mucous membranes are moist.   Eyes:      Extraocular Movements: Extraocular movements intact. Conjunctiva/sclera: Conjunctivae normal.      Pupils: Pupils are equal, round, and reactive to light. Neck:      Vascular: No carotid bruit. Cardiovascular:      Rate and Rhythm: Normal rate and regular rhythm. Pulses: Normal pulses. Heart sounds: Normal heart sounds. Pulmonary:      Effort: Pulmonary effort is normal. No respiratory distress. Breath sounds: Normal breath sounds. Abdominal:      General: Bowel sounds are normal. There is no distension. Tenderness: There is no abdominal tenderness. Musculoskeletal:         General: Normal range of motion. Cervical back: Normal range of motion and neck supple. Lymphadenopathy:      Cervical: No cervical adenopathy. Skin:     General: Skin is warm and dry. Neurological:      General: No focal deficit present. Mental Status: He is alert and oriented to person, place, and time. Psychiatric:         Mood and Affect: Mood normal.         Behavior: Behavior normal.         Thought Content: Thought content normal.         Assessment and Plan:          1. Depression, unspecified depression type  -     FLUoxetine (PROZAC) 20 MG capsule; Take 1 capsule by mouth daily, Disp-30 capsule, R-3Normal  -     [employer]  2. Psychophysiologic insomnia  -     zolpidem (AMBIEN) 5 MG tablet; Take 1 tablet by mouth nightly as needed for Sleep for up to 14 days. , Disp-14 tablet, R-0Normal   Patient educated on black box warning of medication. Patient is not suicidal and states he has his dog to live for. He was educated not to take Ambien if he does not have a full 8 hours to sleep. Patient has taken [place] before and is agreeable to the plan. Patient was only given a 14 days supply and educated to use it only when he is unable to sleep at all. Do not take with any other sedating medications.  Keep follow up with oncology, thoracic surgery, GI, Pain management, and get follow up with behavior health. Patient given educational materials - see patient instructions. Discussed use, benefit, and side effects of prescribed medications. All patient questions answered. Pt voiced understanding. Reviewed health maintenance. Instructed to continue current medications, diet and exercise. Patient agreed with treatment plan. Follow up as directed.      Electronically signed by VASU Alexander on 7/19/2021 at 5:46 PM

## 2021-07-19 NOTE — TELEPHONE ENCOUNTER
Pt's wife called requesting to change NAVEED f/u w/Dr. lin from 8/2/21 to 8/19/21 so that they may have a DUAL appt w/MO.    Appt changed per request

## 2021-07-20 ENCOUNTER — APPOINTMENT (OUTPATIENT)
Dept: RADIATION ONCOLOGY | Age: 61
End: 2021-07-20
Attending: RADIOLOGY
Payer: MEDICARE

## 2021-07-29 ENCOUNTER — TELEPHONE (OUTPATIENT)
Dept: ONCOLOGY | Age: 61
End: 2021-07-29

## 2021-07-29 NOTE — TELEPHONE ENCOUNTER
Writer received a call from pt s.o. asking to verify an appt at [facility] for 7/30/21. After reviewing [facility] notes, Leandro Kumari does note that he is scheduled for f/u with Dr. Melinda Traylor on 7/30. Writer unable to see time. She states that pt was suppose to have a consult with Dr. Adeline Perez, hem/onc at [facility] before going back to see Dr. Melinda Traylor and Dr. Flory Reid previously scheduled appt was cancelled by the office. She states shes going to call [facility] now to get it all figured out. Will continue to follow.

## 2021-08-16 ENCOUNTER — OFFICE VISIT (OUTPATIENT)
Dept: PRIMARY CARE CLINIC | Age: 61
End: 2021-08-16
Payer: MEDICARE

## 2021-08-16 VITALS
OXYGEN SATURATION: 97 % | SYSTOLIC BLOOD PRESSURE: 124 MMHG | HEART RATE: 93 BPM | HEIGHT: 70 IN | BODY MASS INDEX: 30.26 KG/M2 | DIASTOLIC BLOOD PRESSURE: 80 MMHG | WEIGHT: 211.4 LBS

## 2021-08-16 DIAGNOSIS — Z00.00 HEALTH CARE MAINTENANCE: ICD-10-CM

## 2021-08-16 DIAGNOSIS — F41.9 ANXIETY: Primary | ICD-10-CM

## 2021-08-16 DIAGNOSIS — Z12.5 SCREENING FOR PROSTATE CANCER: ICD-10-CM

## 2021-08-16 PROCEDURE — 99213 OFFICE O/P EST LOW 20 MIN: CPT | Performed by: PHYSICIAN ASSISTANT

## 2021-08-16 ASSESSMENT — ENCOUNTER SYMPTOMS
RHINORRHEA: 0
COUGH: 0
VOMITING: 0
PHOTOPHOBIA: 0
DIARRHEA: 0
CONSTIPATION: 0
ABDOMINAL PAIN: 0
EYE DISCHARGE: 0
CHEST TIGHTNESS: 0
ABDOMINAL DISTENTION: 0
SHORTNESS OF BREATH: 0
SINUS PRESSURE: 0
SORE THROAT: 0

## 2021-08-16 NOTE — PROGRESS NOTES
[facility]  [address] [facility] [address]  Dept: [address] Saadia Maharaj is a 57 y.o. male Established patient, who presents today for his medical conditions/complaints as noted below. Chief Complaint   Patient presents with    1 Month Follow-Up     pt states he's feeling better       HPI:     HPI: The patient is doing better. His pain is better and mood is improved. He got some appetite back and is swallowing well. He is going to [facility] for his esophageal cancer. Nothing else bothering him right now.      Reviewed prior notes None  Reviewed previous Labs    LDL Cholesterol (mg/dL)   Date Value   08/19/2021 46   07/03/2020 103   02/02/2019 129       (goal LDL is <100)   AST (U/L)   Date Value   08/19/2021 39   08/19/2021 39     ALT (U/L)   Date Value   08/19/2021 10   08/19/2021 10     BUN (mg/dL)   Date Value   08/19/2021 5 (L)   08/19/2021 5 (L)     Hemoglobin A1C (%)   Date Value   08/19/2021 4.2     TSH (mIU/L)   Date Value   07/03/2020 3.35     BP Readings from Last 3 Encounters:   08/23/21 125/75   08/23/21 125/75   08/19/21 128/79          (goal 120/80)    Past Medical History:   Diagnosis Date    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 Christina Valentino, orthopedic, every 3-4 months, gets steroid injection    Lezama esophagus     BPH (benign prostatic hypertrophy)     Cholelithiasis     Cirrhosis ([address]     COVID-19 12/2020    pt reports he had a positive test while at [facility] in 2020, was asymptomatic    COVID-19 vaccine series completed 5/20/2021, 6/22/2021    Moderna 5/20/2021, 6/22/2021    DDD (degenerative disc disease), lumbar     Depression     Esophageal cancer ([address]     INVASIVE ADENOCARCINOMA ARISING IN TUBULAR ADENOMA WITH HIGH GRADE DYSPLASIA, ASSOCIATED WITH FOCAL INTESTINAL METAPLASIA     Esophageal varices ([address]     Fatty liver     GERD (gastroesophageal reflux disease)     Hep C w/o coma, chronic (HCC)     History of alcohol abuse     6-12 beers a day; quit drinking July 2016    History of blood transfusion     History of colon polyps 2016    History of tobacco abuse     [place] (hard of hearing)     Hyperlipidemia     Hypertension     Port-A-Cath in place     right upper chest    Stomach ulcer     hx of    Tubular adenoma of colon 2016, 2018    Vitamin D deficiency     Wears glasses       Past Surgical History:   Procedure Laterality Date    BUNIONECTOMY      twice on right side    BUNIONECTOMY Left     CARPAL TUNNEL RELEASE Right     COLONOSCOPY      at age 35    COLONOSCOPY  10/05/2016    polyps-pathology tubular adenoma, and abnormal looking mucosa right colon-pathology-tubular adenoma    COLONOSCOPY N/A 3/30/2018    COLONOSCOPY POLYPECTOMY COLD BIOPSY performed by Sandra Hernández MD at [address] [place] [address] Way  03/30/2018    Small polyp in the sigmoid colon and excised with biopsy forceps--tubular adenoma    ENDOSCOPY, COLON, DIAGNOSTIC      EGD    IR PORT PLACEMENT EQUAL OR GREATER THAN 5 YEARS  4/19/2021    IR PORT PLACEMENT EQUAL OR GREATER THAN 5 YEARS 4/19/2021 [facility] SPECIAL PROCEDURES    KNEE SURGERY Left     cyst removed    NASAL SEPTUM SURGERY      NERVE BLOCK Right 11/23/2020    NERVE BLOCK RIGHT CERVICAL STEROID INJECTION  C3-C6 performed by Ezekiel Walters MD at [address] [place] [address]  01/04/16    steroid injection C7 T1    OTHER SURGICAL HISTORY  11/21/2016    Bilateral Lumbar CACHORRO L4-L5 injections    OTHER SURGICAL HISTORY  12/19/2016    lumbar steroid injection    OTHER SURGICAL HISTORY  09/28/2018    BILATERAL L5 CACHORRO (N/A Back)    OTHER SURGICAL HISTORY Right 11/23/2020    cervical injection    PAIN MANAGEMENT PROCEDURE Left 7/9/2020    EPIDURAL STEROID INJECTION LEFT L4 L5 performed by Ezekiel Walters MD at [facility] [address] Left 7/20/2020    LEFT L4 L5 EPIDURAL STEROID INJECTION performed by Jo Jorge MD at [facility] Bilateral 2020    LUMBAR FACET BILATERAL L2-L5 performed by Jo Jorge MD at [facility] Bilateral 2020    NERVE BLOCK BILATERAL LUMBAR MEDIAL BRANCH L2-L5 performed by Jo Jorge MD at [address] AA&/STRD TFRML EPI LUMBAR/SACRAL 1 LEVEL Bilateral 2018    BILATERAL L5 CACHORRO performed by Jo Jorge MD at [address] AA&/STRD TFRML EPI LUMBAR/SACRAL 1 LEVEL N/A 2018    BILATERAL L5 CACHORRO performed by Jo Jorge MD at [address] [facility] N/CARPAL TUNNEL SURG Right 2017    CARPAL TUNNEL RELEASE RIGHT performed by Jose Magana MD at [facility] N/CARPAL TUNNEL SURG Left 10/31/2017    CARPAL TUNNEL RELEASE performed by Jose Magana MD at [facility] N/A 2020    EGD BIOPSY performed by Darlene Lazar MD at [facility] N/A 2021    EGD BIOPSY and spot marking performed by Laurita Kendall MD at [facility] N/A 2021    ENDOSCOPIC ULTRASOUND, EGD performed by Vilma Hardy MD at [place] [address]  2021    EGD DIAGNOSTIC ONLY performed by Vilma Hardy MD at [facility]    WISDOM TOOTH EXTRACTION         Family History   Problem Relation Age of Onset    Cancer Mother         pancreatic    Cancer Father         bone    Diabetes Sister     Asthma Brother        Social History     Tobacco Use    Smoking status: Former Smoker     Packs/day: 1.00     Years: 45.00     Pack years: 45.00     Quit date: 2017     Years since quittin.6    Smokeless tobacco: Never Used   Substance Use Topics    Alcohol use: Not Currently     Comment: quit 2019      Current Outpatient Medications   Medication Sig Dispense Refill    FLUoxetine (PROZAC) 20 MG capsule Take 1 capsule by mouth daily 30 capsule 3    omeprazole (PRILOSEC) 40 MG delayed release capsule Take 1 capsule by mouth every morning (before breakfast) 30 capsule 2    lidocaine-prilocaine (EMLA) 2.5-2.5 % cream Apply topically 45-60 mins prior to needle poke daily PRN 1 Tube 2    prochlorperazine (COMPAZINE) 10 MG tablet Take 1 tablet by mouth every 6 hours as needed (nausea vomiting) 120 tablet 3    ondansetron (ZOFRAN-ODT) 8 MG TBDP disintegrating tablet Place 1 tablet under the tongue 3 times daily as needed for Nausea or Vomiting 90 tablet 2    D3-50 1.25 MG (60110 UT) CAPS once a week On Tuesdays      hydrOXYzine (VISTARIL) 25 MG capsule take 1 capsule by mouth three times a day if needed for anxiety 90 capsule 2    atorvastatin (LIPITOR) 20 MG tablet take 1 tablet by mouth at bedtime 90 tablet 1    spironolactone (ALDACTONE) 50 MG tablet take 1 tablet by mouth once daily 90 tablet 3    vitamin D (ERGOCALCIFEROL) 1.25 MG (23478 UT) CAPS capsule take 1 capsule by mouth every week 12 capsule 1    VITAMIN D, CHOLECALCIFEROL, PO Take by mouth Takes an OTC dose, unsure of dosage      traZODone (DESYREL) 50 MG tablet take 1 tablet by mouth nightly       No current facility-administered medications for this visit.      Allergies   Allergen Reactions    Pollen Extract      Runny nose, watery eyes       Health Maintenance   Topic Date Due    Pneumococcal 0-64 years Vaccine (3 of 4 - PCV13) 07/25/2017    Shingles Vaccine (1 of 2) 02/23/2022 (Originally 8/15/2009)    Flu vaccine (1) 09/01/2021    Annual Wellness Visit (AWV)  03/12/2022    Low dose CT lung screening  07/21/2022    Lipid screen  08/19/2022    Potassium monitoring  08/19/2022    Creatinine monitoring  08/19/2022    Colon cancer screen colonoscopy  03/30/2023    DTaP/Tdap/Td vaccine (2 - Td or Tdap) 04/06/2027    Hepatitis A vaccine  Completed    Hepatitis B vaccine  Completed    COVID-19 Vaccine  Completed    Hib vaccine  Aged C/ Get Luli 19 Meningococcal (ACWY) vaccine  Aged Out    HIV screen  Discontinued       Subjective:      Review of Systems   Constitutional: Negative for chills, fever and unexpected weight change. HENT: Negative for congestion, hearing loss, rhinorrhea, sinus pressure and sore throat. Eyes: Negative for photophobia, discharge and visual disturbance. Respiratory: Negative for cough, chest tightness and shortness of breath. Cardiovascular: Negative for chest pain, palpitations and leg swelling. Gastrointestinal: Negative for abdominal distention, abdominal pain, constipation, diarrhea and vomiting. Endocrine: Negative for polydipsia and polyuria. Genitourinary: Negative for decreased urine volume, difficulty urinating, frequency and urgency. Musculoskeletal: Negative for arthralgias, gait problem and myalgias. Skin: Negative for rash. Allergic/Immunologic: Negative for food allergies. Neurological: Negative for dizziness, weakness, numbness and headaches. Hematological: Negative for adenopathy. Psychiatric/Behavioral: Negative for dysphoric mood and sleep disturbance. The patient is not nervous/anxious. Objective:     /80   Pulse 93   Ht 5' 10\" (1.778 m)   Wt 211 lb 6.4 oz (95.9 kg)   SpO2 97%   BMI 30.33 kg/m²   Physical Exam  Constitutional:       General: He is not in acute distress. Appearance: Normal appearance. He is not ill-appearing. HENT:      Head: Normocephalic and atraumatic. Right Ear: External ear normal.      Left Ear: External ear normal.      Nose: Nose normal.      Mouth/Throat:      Mouth: Mucous membranes are moist.   Eyes:      Extraocular Movements: Extraocular movements intact. Conjunctiva/sclera: Conjunctivae normal.      Pupils: Pupils are equal, round, and reactive to light. Neck:      Vascular: No carotid bruit. Cardiovascular:      Rate and Rhythm: Normal rate and regular rhythm. Pulses: Normal pulses.       Heart sounds: Normal heart sounds. Pulmonary:      Effort: Pulmonary effort is normal. No respiratory distress. Breath sounds: Normal breath sounds. Abdominal:      General: Bowel sounds are normal. There is no distension. Tenderness: There is no abdominal tenderness. Musculoskeletal:         General: Normal range of motion. Cervical back: Normal range of motion and neck supple. Lymphadenopathy:      Cervical: No cervical adenopathy. Skin:     General: Skin is warm and dry. Neurological:      General: No focal deficit present. Mental Status: He is alert and oriented to person, place, and time. Psychiatric:         Mood and Affect: Mood normal.         Behavior: Behavior normal.         Thought Content: Thought content normal.         Assessment and Plan:          1. Anxiety  2. Health care maintenance  -     Hemoglobin A1C; Future  -     Lipid, Fasting; Future  -     PSA Screening; Future  3. Screening for prostate cancer  -     PSA Screening; Future     Continue medication as is        Patient given educational materials - see patient instructions. Discussed use, benefit, and side effects of prescribed medications. All patient questions answered. Pt voiced understanding. Reviewed health maintenance. Instructed to continue current medications, diet and exercise. Patient agreed with treatment plan. Follow up as directed.      Electronically signed by VASU Harvey on 8/27/2021 at 8:48 AM

## 2021-08-19 ENCOUNTER — HOSPITAL ENCOUNTER (OUTPATIENT)
Age: 61
Discharge: HOME | End: 2021-08-19
Payer: MEDICARE

## 2021-08-19 ENCOUNTER — HOSPITAL ENCOUNTER (OUTPATIENT)
Dept: RADIATION ONCOLOGY | Age: 61
Discharge: HOME | End: 2021-08-19
Attending: RADIOLOGY
Payer: MEDICARE

## 2021-08-19 ENCOUNTER — TELEPHONE (OUTPATIENT)
Dept: ONCOLOGY | Age: 61
End: 2021-08-19

## 2021-08-19 ENCOUNTER — OFFICE VISIT (OUTPATIENT)
Dept: ONCOLOGY | Age: 61
End: 2021-08-19
Payer: MEDICARE

## 2021-08-19 VITALS
BODY MASS INDEX: 28.84 KG/M2 | OXYGEN SATURATION: 97 % | DIASTOLIC BLOOD PRESSURE: 79 MMHG | SYSTOLIC BLOOD PRESSURE: 128 MMHG | HEART RATE: 98 BPM | WEIGHT: 201 LBS | TEMPERATURE: 97.9 F | RESPIRATION RATE: 14 BRPM

## 2021-08-19 VITALS
DIASTOLIC BLOOD PRESSURE: 86 MMHG | WEIGHT: 210.1 LBS | SYSTOLIC BLOOD PRESSURE: 145 MMHG | TEMPERATURE: 97.8 F | BODY MASS INDEX: 30.15 KG/M2 | HEART RATE: 80 BPM

## 2021-08-19 DIAGNOSIS — C15.5 MALIGNANT NEOPLASM OF LOWER THIRD OF ESOPHAGUS (HCC): Primary | ICD-10-CM

## 2021-08-19 DIAGNOSIS — Z00.00 HEALTH CARE MAINTENANCE: ICD-10-CM

## 2021-08-19 DIAGNOSIS — Z12.5 SCREENING FOR PROSTATE CANCER: ICD-10-CM

## 2021-08-19 DIAGNOSIS — C15.5 MALIGNANT NEOPLASM OF LOWER THIRD OF ESOPHAGUS (HCC): ICD-10-CM

## 2021-08-19 LAB
ABSOLUTE EOS #: 0.1 K/UL (ref 0–0.4)
ABSOLUTE EOS #: 0.1 K/UL (ref 0–0.4)
ABSOLUTE IMMATURE GRANULOCYTE: ABNORMAL K/UL (ref 0–0.3)
ABSOLUTE IMMATURE GRANULOCYTE: ABNORMAL K/UL (ref 0–0.3)
ABSOLUTE LYMPH #: 0.7 K/UL (ref 1–4.8)
ABSOLUTE LYMPH #: 0.7 K/UL (ref 1–4.8)
ABSOLUTE MONO #: 0.6 K/UL (ref 0.1–1.2)
ABSOLUTE MONO #: 0.6 K/UL (ref 0.1–1.2)
ALBUMIN SERPL-MCNC: 3.4 G/DL (ref 3.5–5.2)
ALBUMIN SERPL-MCNC: 3.4 G/DL (ref 3.5–5.2)
ALBUMIN/GLOBULIN RATIO: 0.9 (ref 1–2.5)
ALBUMIN/GLOBULIN RATIO: 0.9 (ref 1–2.5)
ALP BLD-CCNC: 75 U/L (ref 40–129)
ALP BLD-CCNC: 75 U/L (ref 40–129)
ALT SERPL-CCNC: 10 U/L (ref 5–41)
ALT SERPL-CCNC: 10 U/L (ref 5–41)
ANION GAP SERPL CALCULATED.3IONS-SCNC: 11 MMOL/L (ref 9–17)
ANION GAP SERPL CALCULATED.3IONS-SCNC: 11 MMOL/L (ref 9–17)
AST SERPL-CCNC: 39 U/L
AST SERPL-CCNC: 39 U/L
BASOPHILS # BLD: 0 % (ref 0–2)
BASOPHILS # BLD: 0 % (ref 0–2)
BASOPHILS ABSOLUTE: 0 K/UL (ref 0–0.2)
BASOPHILS ABSOLUTE: 0 K/UL (ref 0–0.2)
BILIRUB SERPL-MCNC: 1.32 MG/DL (ref 0.3–1.2)
BILIRUB SERPL-MCNC: 1.32 MG/DL (ref 0.3–1.2)
BILIRUBIN DIRECT: 0.5 MG/DL
BILIRUBIN, INDIRECT: 0.82 MG/DL (ref 0–1)
BUN BLDV-MCNC: 5 MG/DL (ref 8–23)
BUN BLDV-MCNC: 5 MG/DL (ref 8–23)
BUN/CREAT BLD: ABNORMAL (ref 9–20)
BUN/CREAT BLD: ABNORMAL (ref 9–20)
CALCIUM SERPL-MCNC: 9.7 MG/DL (ref 8.6–10.4)
CALCIUM SERPL-MCNC: 9.7 MG/DL (ref 8.6–10.4)
CHLORIDE BLD-SCNC: 100 MMOL/L (ref 98–107)
CHLORIDE BLD-SCNC: 100 MMOL/L (ref 98–107)
CHOLESTEROL, FASTING: 158 MG/DL
CHOLESTEROL/HDL RATIO: 1.6
CO2: 26 MMOL/L (ref 20–31)
CO2: 26 MMOL/L (ref 20–31)
CREAT SERPL-MCNC: 0.53 MG/DL (ref 0.7–1.2)
CREAT SERPL-MCNC: 0.53 MG/DL (ref 0.7–1.2)
DIFFERENTIAL TYPE: ABNORMAL
DIFFERENTIAL TYPE: ABNORMAL
EOSINOPHILS RELATIVE PERCENT: 2 % (ref 1–4)
EOSINOPHILS RELATIVE PERCENT: 2 % (ref 1–4)
ESTIMATED AVERAGE GLUCOSE: 74 MG/DL
GFR AFRICAN AMERICAN: >60 ML/MIN
GFR AFRICAN AMERICAN: >60 ML/MIN
GFR NON-AFRICAN AMERICAN: >60 ML/MIN
GFR NON-AFRICAN AMERICAN: >60 ML/MIN
GFR SERPL CREATININE-BSD FRML MDRD: ABNORMAL ML/MIN/{1.73_M2}
GLOBULIN: ABNORMAL G/DL (ref 1.5–3.8)
GLUCOSE BLD-MCNC: 98 MG/DL (ref 70–99)
GLUCOSE BLD-MCNC: 98 MG/DL (ref 70–99)
HAV IGM SER IA-ACNC: NONREACTIVE
HBA1C MFR BLD: 4.2 % (ref 4–6)
HBV SURFACE AB TITR SER: 6.04 MIU/ML
HCT VFR BLD CALC: 41.7 % (ref 41–53)
HCT VFR BLD CALC: 41.7 % (ref 41–53)
HDLC SERPL-MCNC: 99 MG/DL
HEMOGLOBIN: 14 G/DL (ref 13.5–17.5)
HEMOGLOBIN: 14 G/DL (ref 13.5–17.5)
IMMATURE GRANULOCYTES: ABNORMAL %
IMMATURE GRANULOCYTES: ABNORMAL %
INR BLD: 1.1
LDL CHOLESTEROL: 46 MG/DL (ref 0–130)
LYMPHOCYTES # BLD: 11 % (ref 24–44)
LYMPHOCYTES # BLD: 11 % (ref 24–44)
MCH RBC QN AUTO: 32.5 PG (ref 26–34)
MCH RBC QN AUTO: 32.5 PG (ref 26–34)
MCHC RBC AUTO-ENTMCNC: 33.5 G/DL (ref 31–37)
MCHC RBC AUTO-ENTMCNC: 33.5 G/DL (ref 31–37)
MCV RBC AUTO: 97.2 FL (ref 80–100)
MCV RBC AUTO: 97.2 FL (ref 80–100)
MONOCYTES # BLD: 11 % (ref 2–11)
MONOCYTES # BLD: 11 % (ref 2–11)
NRBC AUTOMATED: ABNORMAL PER 100 WBC
NRBC AUTOMATED: ABNORMAL PER 100 WBC
PDW BLD-RTO: 13.6 % (ref 12.5–15.4)
PDW BLD-RTO: 13.6 % (ref 12.5–15.4)
PLATELET # BLD: 122 K/UL (ref 140–450)
PLATELET # BLD: 122 K/UL (ref 140–450)
PLATELET ESTIMATE: ABNORMAL
PLATELET ESTIMATE: ABNORMAL
PMV BLD AUTO: 8.1 FL (ref 6–12)
PMV BLD AUTO: 8.1 FL (ref 6–12)
POTASSIUM SERPL-SCNC: 4.4 MMOL/L (ref 3.7–5.3)
POTASSIUM SERPL-SCNC: 4.4 MMOL/L (ref 3.7–5.3)
PROSTATE SPECIFIC ANTIGEN: 0.11 UG/L
PROTHROMBIN TIME: 11.6 SEC (ref 9.4–12.6)
RBC # BLD: 4.29 M/UL (ref 4.5–5.9)
RBC # BLD: 4.29 M/UL (ref 4.5–5.9)
RBC # BLD: ABNORMAL 10*6/UL
RBC # BLD: ABNORMAL 10*6/UL
SEG NEUTROPHILS: 76 % (ref 36–66)
SEG NEUTROPHILS: 76 % (ref 36–66)
SEGMENTED NEUTROPHILS ABSOLUTE COUNT: 4.5 K/UL (ref 1.8–7.7)
SEGMENTED NEUTROPHILS ABSOLUTE COUNT: 4.5 K/UL (ref 1.8–7.7)
SODIUM BLD-SCNC: 137 MMOL/L (ref 135–144)
SODIUM BLD-SCNC: 137 MMOL/L (ref 135–144)
TOTAL PROTEIN: 7.4 G/DL (ref 6.4–8.3)
TOTAL PROTEIN: 7.4 G/DL (ref 6.4–8.3)
TRIGLYCERIDE, FASTING: 64 MG/DL
VLDLC SERPL CALC-MCNC: NORMAL MG/DL (ref 1–30)
WBC # BLD: 5.9 K/UL (ref 3.5–11)
WBC # BLD: 5.9 K/UL (ref 3.5–11)
WBC # BLD: ABNORMAL 10*3/UL
WBC # BLD: ABNORMAL 10*3/UL

## 2021-08-19 PROCEDURE — 80053 COMPREHEN METABOLIC PANEL: CPT

## 2021-08-19 PROCEDURE — G0103 PSA SCREENING: HCPCS

## 2021-08-19 PROCEDURE — 86709 HEPATITIS A IGM ANTIBODY: CPT

## 2021-08-19 PROCEDURE — 87522 HEPATITIS C REVRS TRNSCRPJ: CPT

## 2021-08-19 PROCEDURE — 80061 LIPID PANEL: CPT

## 2021-08-19 PROCEDURE — 85025 COMPLETE CBC W/AUTO DIFF WBC: CPT

## 2021-08-19 PROCEDURE — 36415 COLL VENOUS BLD VENIPUNCTURE: CPT

## 2021-08-19 PROCEDURE — 82103 ALPHA-1-ANTITRYPSIN TOTAL: CPT

## 2021-08-19 PROCEDURE — 99211 OFF/OP EST MAY X REQ PHY/QHP: CPT | Performed by: INTERNAL MEDICINE

## 2021-08-19 PROCEDURE — 80076 HEPATIC FUNCTION PANEL: CPT

## 2021-08-19 PROCEDURE — 80048 BASIC METABOLIC PNL TOTAL CA: CPT

## 2021-08-19 PROCEDURE — 86317 IMMUNOASSAY INFECTIOUS AGENT: CPT

## 2021-08-19 PROCEDURE — 85610 PROTHROMBIN TIME: CPT

## 2021-08-19 PROCEDURE — 86708 HEPATITIS A ANTIBODY: CPT

## 2021-08-19 PROCEDURE — 82104 ALPHA-1-ANTITRYPSIN PHENO: CPT

## 2021-08-19 PROCEDURE — 99212 OFFICE O/P EST SF 10 MIN: CPT | Performed by: RADIOLOGY

## 2021-08-19 PROCEDURE — 83036 HEMOGLOBIN GLYCOSYLATED A1C: CPT

## 2021-08-19 PROCEDURE — 99214 OFFICE O/P EST MOD 30 MIN: CPT | Performed by: INTERNAL MEDICINE

## 2021-08-19 RX ORDER — ZOLPIDEM TARTRATE 5 MG/1
5 TABLET ORAL NIGHTLY PRN
COMMUNITY
End: 2021-08-25 | Stop reason: ALTCHOICE

## 2021-08-19 RX ORDER — TRAZODONE HYDROCHLORIDE 50 MG/1
TABLET ORAL
Status: ON HOLD | COMMUNITY
Start: 2021-06-12 | End: 2021-08-31

## 2021-08-19 NOTE — PROGRESS NOTES
Patient ID: Mohsen Parks, [birth year], [id number], 57 y.o. Referred by : Dr Marivel Neal  Reason for consultation:   Esophageal cancer T2N0Mx  Stage II   started on concurrent chemoradiation preoperatively on 5/4/21  Chemoradiation completed 6/9/21  Patient had a PET scan on 7/23/2021 which showed no evidence of recurrence  Patient has been evaluated by thoracic surgeon at [address] [place] [address],[address] Dr. Myah Goff and also hepatologist Dr. Markus Ahmadi:    Oncologic History:  Mohsen Parks is a 57 y.o. male with a history of hepatitis C, status post treatment, liver cirrhosis, history of alcohol abuse was seen during initial consultation visit for newly diagnosed esophageal cancer. Patient reportedly had \"heavy drinking alcohol in about 2016 however recently in December he had 2 beers and he presented with hematemesis. On 12/29/2020 he had upper endoscopy which showed severe portal hypertension, gastropathy. The biopsy from the GE junction showed invasive adenocarcinoma. Patient had a follow-up EGD on 2/2/2021 which confirmed esophageal adenocarcinoma. Patient had endoscopic ultrasound on 2/12/2021 which showed T2 N0 MX disease with underlying esophageal varices. In the esophagus hypoechoic lesion noted in the lower esophagus penetrating into submucosa and into muscularis propria. No lymphadenopathy noted. Patient was referred to medical oncology for further recommendations. He denies any difficulty swallowing. He does not smoke he has quit smoking in 2016. He has not drank alcohol since December. He has a history of hepatitis C and he has received treatment. He lives by himself. He is accompanied by his ex-wife during today's office visit. INTERVAL HISTORY:   Is return for follow that and to discuss further recommendations.   Patient has been following with thoracic surgery at [facility],[address] and has been evaluated by Dr. Myah Goff who referred him to hepatologist Dr. Bert Sainz for assessing the risk because of his liver disease for his esophageal resection. Patient has a follow-up appointment with Dr. Steven Dietrich on 9/3/2021. Patient will be scheduled with gastroenterology to have follow-up endoscopy in couple of weeks. He denies any swallowing difficulty. He denies any pain, abdominal pain nausea vomiting. During this visit patient's allergy, social, medical, surgical history and medications were reviewed and updated.     Past Medical History:   Diagnosis Date    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 ([facility]     Alcohol abuse     6-12 beers a day; quit drinking July 2016    Arthritis     Back pain, chronic     dr. Sean Rosen, orthopedic, every 3-4 months, gets steroid injection    Lezama esophagus     BPH (benign prostatic hypertrophy)     Cholelithiasis     Cirrhosis ([facility]     COVID-19 12/2020    pt reports he tested + while at [facility] DDD (degenerative disc disease), lumbar     Esophageal varices ([facility]     Fatty liver     GERD (gastroesophageal reflux disease)     Hep C w/o coma, chronic ([facility]     History of blood transfusion     History of colon polyps 2016    [place] (hard of hearing)     Hyperlipidemia     Hypertension     Stomach ulcer     hx of    Tobacco abuse     Tubular adenoma of colon 2016, 2018    Wears glasses        Past Surgical History:   Procedure Laterality Date    BUNIONECTOMY      twice on right side    BUNIONECTOMY Left     CARPAL TUNNEL RELEASE Right     COLONOSCOPY      at age 35    COLONOSCOPY  10/05/2016    polyps-pathology tubular adenoma, and abnormal looking mucosa right colon-pathology-tubular adenoma    COLONOSCOPY N/A 3/30/2018    COLONOSCOPY POLYPECTOMY COLD BIOPSY performed by Orlando Freeman MD at [address]  03/30/2018    Small polyp in the sigmoid colon and excised with biopsy forceps--tubular adenoma    ENDOSCOPY, COLON, DIAGNOSTIC      EGD    IR PORT PLACEMENT EQUAL OR GREATER THAN 5 YEARS  4/19/2021    IR PORT PLACEMENT EQUAL OR GREATER THAN 5 YEARS 4/19/2021 [facility] SPECIAL PROCEDURES    KNEE SURGERY Left     cyst removed    NASAL SEPTUM SURGERY      NERVE BLOCK Right 11/23/2020    NERVE BLOCK RIGHT CERVICAL STEROID INJECTION  C3-C6 performed by Makenzie Chang MD at [address] [place] [address]  01/04/16    steroid injection C7 T1    OTHER SURGICAL HISTORY  11/21/2016    Bilateral Lumbar CACHORRO L4-L5 injections    OTHER SURGICAL HISTORY  12/19/2016    lumbar steroid injection    OTHER SURGICAL HISTORY  09/28/2018    BILATERAL L5 CACHORRO (N/A Back)    OTHER SURGICAL HISTORY Right 11/23/2020    cervical injection    PAIN MANAGEMENT PROCEDURE Left 7/9/2020    EPIDURAL STEROID INJECTION LEFT L4 L5 performed by Makenzie Chang MD at [address] [place] [address] Left 7/20/2020    LEFT L4 L5 EPIDURAL STEROID INJECTION performed by Makenzie Chang MD at [address] [place] [address] Bilateral 8/17/2020    LUMBAR FACET BILATERAL L2-L5 performed by Makenzie Chang MD at [address] [place] [address] Bilateral 12/7/2020    NERVE BLOCK BILATERAL LUMBAR MEDIAL BRANCH L2-L5 performed by Makenzie Chang MD at [address] AA&/STRD TFRML EPI LUMBAR/SACRAL 1 LEVEL Bilateral 9/6/2018    BILATERAL L5 CACHORRO performed by Makenzie Chang MD at [address] AA&/STRD TFRML EPI LUMBAR/SACRAL 1 LEVEL N/A 9/28/2018    BILATERAL L5 CACHORRO performed by Makenzie Chang MD at [facility]/CARPAL TUNNEL SURG Right 8/29/2017    CARPAL TUNNEL RELEASE RIGHT performed by Kraig Sanchez MD at [facility]/CARPAL TUNNEL SURG Left 10/31/2017    CARPAL TUNNEL RELEASE performed by Kraig Sanchez MD at [address] [facility] [address] 12/29/2020    EGD BIOPSY performed by Veronique Whitman MD at [address] [facility] [address] 2/2/2021    EGD BIOPSY and spot marking performed by Robert Padgett MD at [address] [place] [address] 2/12/2021    ENDOSCOPIC ULTRASOUND, EGD performed by Loretta Tapia MD at [facility]  2/12/2021    EGD DIAGNOSTIC ONLY performed by Loretta Tapia MD at [facility] EXTRACTION       Allergies   Allergen Reactions    Pollen Extract      Runny nose, watery eyes       Current Outpatient Medications   Medication Sig Dispense Refill    FLUoxetine (PROZAC) 20 MG capsule Take 1 capsule by mouth daily 30 capsule 3    omeprazole (PRILOSEC) 40 MG delayed release capsule Take 1 capsule by mouth every morning (before breakfast) 30 capsule 2    lidocaine-prilocaine (EMLA) 2.5-2.5 % cream Apply topically 45-60 mins prior to needle poke daily PRN 1 Tube 2    prochlorperazine (COMPAZINE) 10 MG tablet Take 1 tablet by mouth every 6 hours as needed (nausea vomiting) 120 tablet 3    ondansetron (ZOFRAN-ODT) 8 MG TBDP disintegrating tablet Place 1 tablet under the tongue 3 times daily as needed for Nausea or Vomiting 90 tablet 2    dexamethasone (DECADRON) 4 MG tablet Take 5 tabs (20 mg) 12 and 6 hours before scheduled PACLItaxel (Taxol) infusion.  30 tablet 2    lactulose (CHRONULAC) 10 GM/15ML solution Take 15 mLs by mouth 4 times daily as needed      D3-50 1.25 MG (41752 UT) CAPS once a week On Tuesdays      hydrOXYzine (VISTARIL) 25 MG capsule take 1 capsule by mouth three times a day if needed for anxiety 90 capsule 2    atorvastatin (LIPITOR) 20 MG tablet take 1 tablet by mouth at bedtime 90 tablet 1    spironolactone (ALDACTONE) 50 MG tablet take 1 tablet by mouth once daily 90 tablet 3    vitamin D (ERGOCALCIFEROL) 1.25 MG (29826 UT) CAPS capsule take 1 capsule by mouth every week 12 capsule 1    nadolol (CORGARD) 20 MG tablet Take 1 tablet by mouth daily 30 tablet 3    rifaximin (XIFAXAN) 550 MG tablet Take 1 tablet by mouth 3 times daily 42 tablet 0    sildenafil (VIAGRA) 100 MG tablet Take 1 tablet by mouth as needed for Erectile Dysfunction 10 tablet 3     No current facility-administered medications for this visit. Social History     Socioeconomic History    Marital status: Single     Spouse name: Not on file    Number of children: Not on file    Years of education: Not on file    Highest education level: Not on file   Occupational History    Not on file   Tobacco Use    Smoking status: Former Smoker     Packs/day: 1.00     Years: 45.00     Pack years: 45.00     Quit date: 2017     Years since quittin.5    Smokeless tobacco: Never Used   Vaping Use    Vaping Use: Never used   Substance and Sexual Activity    Alcohol use: No     Comment: 2019    Drug use: No     Frequency: 1.0 times per week     Comment: cocaine,  stopped spring 2016    Sexual activity: Yes     Partners: Female   Other Topics Concern    Not on file   Social History Narrative     in the past, retired     Social Determinants of Health     Financial Resource Strain: [address] Difficulty of Paying Living Expenses: Not hard at all   Food Insecurity: No Food Insecurity    Worried About [employer] [employer] in the Last Year: Never true   [email or web address] in the Last Year: Never true   Transportation Needs: No Transportation Needs    Lack of Transportation (Medical): No    Lack of Transportation (Non-Medical):  No   Physical Activity:     Days of Exercise per Week:     Minutes of Exercise per Session:    Stress:     Feeling of Stress :    Social Connections:     Frequency of Communication with Friends and Family:     Frequency of Social Gatherings with Friends and Family:     Attends [employer] Services:     Active Member of Clubs or Organizations:     Attends Club or Organization Meetings:     Marital Status:    Intimate Partner Violence:     Fear of Current or Ex-Partner:     Emotionally Abused:     Physically Abused:     Sexually Abused:        Family History   Problem Relation Age of Onset    Cancer Mother         pancreatic    Cancer Father         bone    Diabetes Sister     Asthma Brother         REVIEW OF SYSTEM:     Constitutional: No fever or chills. No night sweats, no weight loss   Eyes: No eye discharge, double vision, or eye pain   HEENT: negative for sore mouth, sore throat, hoarseness and voice change   Respiratory: negative for cough , sputum, dyspnea, wheezing, hemoptysis, chest pain   Cardiovascular: negative for chest pain, dyspnea, palpitations, orthopnea, PND   Gastrointestinal: negative for nausea, vomiting, diarrhea, constipation, abdominal pain, Dysphagia, hematemesis and hematochezia   Genitourinary: negative for frequency, dysuria, nocturia, urinary incontinence, and hematuria   Integument: negative for rash, skin lesions, bruises. Hematologic/Lymphatic: negative for easy bruising, bleeding, lymphadenopathy, petechiae and swelling/edema   Endocrine: negative for heat or cold intolerance, tremor, weight changes, change in bowel habits and hair loss   Musculoskeletal: negative for myalgias, arthralgias, pain, joint swelling,and bone pain   Neurological: negative for headaches, dizziness, seizures, weakness, numbness       OBJECTIVE:         There were no vitals filed for this visit.     PHYSICAL EXAM:   General appearance - well appearing, no in pain or distress   Mental status - alert and cooperative   Eyes - pupils equal and reactive, extraocular eye movements intact   Ears - bilateral TM's and external ear canals normal   Mouth - mucous membranes moist, pharynx normal without lesions   Neck - supple, no significant adenopathy   Lymphatics - no palpable lymphadenopathy, no hepatosplenomegaly   Chest - clear to auscultation, no wheezes, rales or rhonchi, symmetric air entry   Heart - normal rate, regular rhythm, normal S1, S2, no murmurs, rubs, clicks or gallops   Abdomen - soft, nontender, nondistended, no masses or organomegaly   Neurological - alert, oriented, normal speech, no focal findings or movement disorder noted Musculoskeletal - no joint tenderness, deformity or swelling   Extremities - peripheral pulses normal, no pedal edema, no clubbing or cyanosis   Skin - normal coloration and turgor, no rashes, no suspicious skin lesions noted   LABORATORY DATA:     Lab Results   Component Value Date    WBC 6.4 07/06/2021    HGB 12.2 (L) 07/06/2021    HCT 38.7 (L) 07/06/2021    MCV 96.5 07/06/2021    PLT See Reflexed IPF Result 07/06/2021    LYMPHOPCT 18 (L) 07/06/2021    RBC 4.01 (L) 07/06/2021    MCH 30.4 07/06/2021    MCHC 31.5 07/06/2021    RDW 18.9 (H) 07/06/2021    MONOPCT 20 (H) 07/06/2021    BASOPCT 1 07/06/2021    NEUTROABS 3.59 07/06/2021    LYMPHSABS 1.18 07/06/2021    MONOSABS 1.30 (H) 07/06/2021    EOSABS 0.30 07/06/2021    BASOSABS 0.04 07/06/2021       Chemistry        Component Value Date/Time     (L) 06/08/2021 0827    K 3.8 06/08/2021 0827    CL 98 06/08/2021 0827    CO2 24 06/08/2021 0827    BUN 7 (L) 06/08/2021 0827    CREATININE 0.46 (L) 06/08/2021 0827        Component Value Date/Time    CALCIUM 8.8 06/08/2021 0827    ALKPHOS 71 06/08/2021 0827    AST 28 06/08/2021 0827    ALT 14 06/08/2021 0827    BILITOT 1.22 (H) 06/08/2021 0827        PATHOLOGY DATA:   Surgical Pathology Report  Surgical Pathology  Collected: 12/29/20 1334   Lab status: Final   Resulting lab: [facility]   Value: [id number]   [address]   [phone]   Fax: [phone]   SURGICAL PATHOLOGY REPORT     Patient Name: Thais Montoya   MR#: [id number]   Specimen #[id number]         Final Diagnosis   GASTROESOPHAGEAL JUNCTION, BIOPSY:          INVASIVE ADENOCARCINOMA    Final Diagnosis   ESOPHAGUS, LESION AT 34 CM, BIOPSY:          INVASIVE ADENOCARCINOMA ARISING IN TUBULAR ADENOMA WITH HIGH   GRADE DYSPLASIA, ASSOCIATED WITH FOCAL INTESTINAL METAPLASIA (SEE   COMMENT)     Diagnosis Comment   The malignancy diagnosis is confirmed by review of a second   pathologist. Christiano Olson result of HER2 IHC with reflex to [facility] for   gastroesophageal adenocarcinoma will be issued in an addendum.    ADDENDUM ([facility])     Date Ordered:     2/16/2021     Status: Signed Out        Date Complete:     2/16/2021     By: Shawnee Griffin M.D.        Date Reported:     2/16/2021       ADDENDUM COMMENT   This addendum is issued in order to incorporate the result of HER2 by   [facility], performed at [address] [place] [address],[address] ([id number], 02/16/2021).       \"RESULTS:  INDETERMINATE     Interpretation:     Average HER2 signals/nucleus:  4.4   Average SUNG 17 signals/nucleus:  3.4   HER2/SUNG 17 signal ratio:  1.3   Number of Observers:  2     Even after analysis of additional cells and review of slides by a   pathologist, FISH results show a HER2/SUNG 17 ratio < 2.0 and an   average of 4-6 HER2 signals per nucleus and 3 or more SUNG 17 signals   per nucleus.  The results are INDETERMINATE.       In this situation, the 2016 CAP/ASCP/ASCO guidelines recommend   selecting a different tumor block for HER2 testing, if available. \"       Of note, there is only one block available for testing of invasive   esophageal adenocarcinoma tumor cells.  It was already used for HER2   IHC and HER2 FISH testing with the results issued in the addendums.     IMAGING DATA:    Reviewed  ASSESSMENT:    Felice Veliz is a 57 y.o. male with history of hepatitis C, liver cirrhosis, history of alcohol abuse, with esophageal cancer. I reviewed the NCCN guidelines and goals of care. Clinically appears to have T2 N0 M0  Stag II, disease. Started on preoperative  concurrent chemoradiation  Now he has completed chemoradiation  A PET scan on 7/21/21 after chemoradiation showed no metabolic evidence of active neoplasm. Again seen is a rim calcified 3.5 cm cystic lesion abutting the lesser curvature of the stomach and pancreatic tail.   Ongoing discussion with hepatologist and thoracic surgery at [facility] because of his high risk comorbidities and liver disease. During today's visit, the patient and the family had a number of reasonable questions which were answered to their satisfaction. They verbalized understanding of the information provided and they agreed to proceed as outlined above. PLAN:   I reviewed the treatment plan with patient and family  He has completed chemoradiation  He has been following with hepatologist and thoracic surgery at [address] [place] [address],[address] and has another follow-up on 9/3 to discuss surgical risk and further recommendation  He will see gastroenterology for follow-up endoscopy in 2 weeks  If further surgery is not possible he will be followed as per the guidelines  He would need another PET scan in October  Return to clinic in about 6 weeks or earlier if needed      Garrison Ji MD  Hematologist/Medical Oncologist      This note is created with the assistance of a speech recognition program.  While intending to generate a document that actually reflects the content of the visit, the document can still have some errors including those of syntax and sound a like substitutions which may escape proof reading. It such instances, actual meaning can be extrapolated by contextual diversion.

## 2021-08-19 NOTE — TELEPHONE ENCOUNTER
AVS from 8/19/21    RTC 6 weeks    RV scheduled 9/21/21 @ 2:30pm    PT was given AVS and an appt schedule    Electronically signed by Grisel Fiore on 8/19/2021 at 2:23 PM

## 2021-08-19 NOTE — PROGRESS NOTES
Ellie Turcios  8/19/2021  12:56 PM      Vitals:    08/19/21 1245   BP: 128/79   Pulse: 98   Resp: 14   Temp: 97.9 °F (36.6 °C)   SpO2: 97%    :  Patient Currently in Pain: Denies             Wt Readings from Last 1 Encounters:   08/19/21 201 lb (91.2 kg)                Current Outpatient Medications:     FLUoxetine (PROZAC) 20 MG capsule, Take 1 capsule by mouth daily, Disp: 30 capsule, Rfl: 3    omeprazole (PRILOSEC) 40 MG delayed release capsule, Take 1 capsule by mouth every morning (before breakfast), Disp: 30 capsule, Rfl: 2    lidocaine-prilocaine (EMLA) 2.5-2.5 % cream, Apply topically 45-60 mins prior to needle poke daily PRN, Disp: 1 Tube, Rfl: 2    prochlorperazine (COMPAZINE) 10 MG tablet, Take 1 tablet by mouth every 6 hours as needed (nausea vomiting), Disp: 120 tablet, Rfl: 3    ondansetron (ZOFRAN-ODT) 8 MG TBDP disintegrating tablet, Place 1 tablet under the tongue 3 times daily as needed for Nausea or Vomiting, Disp: 90 tablet, Rfl: 2    dexamethasone (DECADRON) 4 MG tablet, Take 5 tabs (20 mg) 12 and 6 hours before scheduled PACLItaxel (Taxol) infusion. , Disp: 30 tablet, Rfl: 2    lactulose (CHRONULAC) 10 GM/15ML solution, Take 15 mLs by mouth 4 times daily as needed, Disp: , Rfl:     D3-50 1.25 MG (44545 UT) CAPS, once a week On Tuesdays, Disp: , Rfl:     hydrOXYzine (VISTARIL) 25 MG capsule, take 1 capsule by mouth three times a day if needed for anxiety, Disp: 90 capsule, Rfl: 2    atorvastatin (LIPITOR) 20 MG tablet, take 1 tablet by mouth at bedtime, Disp: 90 tablet, Rfl: 1    spironolactone (ALDACTONE) 50 MG tablet, take 1 tablet by mouth once daily, Disp: 90 tablet, Rfl: 3    vitamin D (ERGOCALCIFEROL) 1.25 MG (79018 UT) CAPS capsule, take 1 capsule by mouth every week, Disp: 12 capsule, Rfl: 1    nadolol (CORGARD) 20 MG tablet, Take 1 tablet by mouth daily, Disp: 30 tablet, Rfl: 3    rifaximin (XIFAXAN) 550 MG tablet, Take 1 tablet by mouth 3 times daily, Disp: 42 tablet, Rfl: 0 sildenafil (VIAGRA) 100 MG tablet, Take 1 tablet by mouth as needed for Erectile Dysfunction, Disp: 10 tablet, Rfl: 3    Immunizations:    Influenza status:    [x]   Current   []   Patient declined    Pneumococcal status:  [x]   Current  []   Patient declined  Covid status:   [x]  Dose #1:                     [x]  Dose #2:               []   Patient declined    Smoking Status:    [] Smoker - PPD:   [x] Nonsmoker - Quit Date:   2017            [] Never a smoker            FALLS RISK SCREEN  Instructions:  Assess the patient and enter the appropriate indicators that are present for fall risk identification. Total the numbers entered and assign a fall risk score from Table 2.  Reassess patient at a minimum every 12 weeks or with status change. Assessment   Date  8/19/2021     1. Mental Ability: confusion/cognitively impaired 0     2. Elimination Issues: incontinence, frequency 0       3. Ambulatory: use of assistive devices (walker, cane, off-loading devices),        attached to equipment (IV pole, oxygen) 0     4. Sensory Limitations: dizziness, vertigo, impaired vision 0     5. Age less than 65        0     6. Age 72 or greater 1     7. Medication: diuretics, strong analgesics, hypnotics, sedatives,        antihypertensive agents 3   8. Falls:  recent history of falls within the last 3 months (not to include slipping or        tripping) 0   TOTAL 4    If score of 4 or greater was education given? No           TABLE 2   Risk Score Risk Level Plan of Care   0-3 Little or  No Risk 1. Provide assistance as indicated for ambulation activities  2. Reorient confused/cognitively impaired patient  3. Chair/bed in low position, stretcher/bed with siderails up except when performing patient care activities  5. Educate patient/family/caregiver on falls prevention  6.  Reassess in 12 weeks or with any noted change in patient condition which places them at a risk for a fall   4-6 Moderate Risk 1.   Provide assistance as indicated for ambulation activities  2. Reorient confused/cognitively impaired patient  3. Chair/bed in low position, stretcher/bed with siderails up except when performing patient care activities  4. Educate patient/family/caregiver on falls prevention     7 or   Higher High Risk 1. Place patient in easily observable treatment room  2. Patient attended at all times by family member or staff  3. Provide assistance as indicated for ambulation activities  4. Reorient confused/cognitively impaired patient  5. Chair/bed in low position, stretcher/bed with siderails up except when performing patient care activities  6. Educate patient/family/caregiver on falls prevention         PLAN: Patient is seen today in follow up. Dr Genevieve Anderson notified and examined pt.  Pt to f/u with RO in 2 months after he sees ron Prescott RN

## 2021-08-20 ENCOUNTER — TELEPHONE (OUTPATIENT)
Dept: PAIN MANAGEMENT | Age: 61
End: 2021-08-20

## 2021-08-20 LAB — HAV AB SERPL IA-ACNC: REACTIVE

## 2021-08-20 NOTE — PROGRESS NOTES
[facility]            Radiation Oncology          [address] [facility]          [place] [address]        Debora Jewels: [phone]        F: [phone]       [email or web address]         Date of Service: 2021     Location:  [facility],   [address] [facility], [place]   [phone]        RADIATION ONCOLOGY FOLLOW UP NOTE    Patient ID:   Dorcas Ortiz  : 1960   MRN: [id number]    DIAGNOSIS:  Cancer Staging  Malignant neoplasm of lower third of esophagus ([facility] [address]  Staging form: Esophagus - Adenocarcinoma, AJCC 8th Edition  - Clinical: Stage IIB (cT2, cN0, cM0) - Signed by Angelica Rajan MD on 3/18/2021    -s/p neoadj chemoRT 50.4Gy + weekly carbo/taxol 21    INTERVAL HISTORY:   Dorcas Ortiz is a 57 y.o.. male with a history of a lower esophageal adenocarcinoma for which he underwent definitive neoadjuvant treatment with chemoradiation therapy completing 2 months ago on 2021. Patient has been following with Dr. Светлана Villarreal at [facility] and underwent a PET scan on 2021 which showed no evidence of any residual disease. Patient subsequently has been recommended to undergo additional procedures including stenting of his liver in preparation for of his esophagectomy. Patient otherwise states that he is feeling well noting that he is eating a regular diet denying any chest pain nausea or trouble swallowing. He has some fatigue but otherwise feels better since completing treatments. He denies any headaches, dizziness, chest pain, abdominal pain, nausea, diarrhea, bony pain, swelling, or bleeding. Patient has not had a post EGD to assess his primary lesion.     MEDICATIONS:    Current Outpatient Medications:     traZODone (DESYREL) 50 MG tablet, take 1 tablet by mouth nightly, Disp: , Rfl:     HYDROcodone-acetaminophen (LORTAB)  MG per 15ML SOLN solution, TAKE 5 ML BY MOUTH EVERY 6 HOURS AS NEEDED FOR PAIN FOR UP TO 14 DAYS, Disp: , Rfl:     zolpidem (AMBIEN) 5 MG tablet, Take 5 mg by mouth nightly as needed for Sleep., Disp: , Rfl:     FLUoxetine (PROZAC) 20 MG capsule, Take 1 capsule by mouth daily, Disp: 30 capsule, Rfl: 3    omeprazole (PRILOSEC) 40 MG delayed release capsule, Take 1 capsule by mouth every morning (before breakfast), Disp: 30 capsule, Rfl: 2    lidocaine-prilocaine (EMLA) 2.5-2.5 % cream, Apply topically 45-60 mins prior to needle poke daily PRN, Disp: 1 Tube, Rfl: 2    prochlorperazine (COMPAZINE) 10 MG tablet, Take 1 tablet by mouth every 6 hours as needed (nausea vomiting), Disp: 120 tablet, Rfl: 3    ondansetron (ZOFRAN-ODT) 8 MG TBDP disintegrating tablet, Place 1 tablet under the tongue 3 times daily as needed for Nausea or Vomiting, Disp: 90 tablet, Rfl: 2    dexamethasone (DECADRON) 4 MG tablet, Take 5 tabs (20 mg) 12 and 6 hours before scheduled PACLItaxel (Taxol) infusion. , Disp: 30 tablet, Rfl: 2    lactulose (CHRONULAC) 10 GM/15ML solution, Take 15 mLs by mouth 4 times daily as needed, Disp: , Rfl:     D3-50 1.25 MG (54907 UT) CAPS, once a week On Tuesdays, Disp: , Rfl:     hydrOXYzine (VISTARIL) 25 MG capsule, take 1 capsule by mouth three times a day if needed for anxiety, Disp: 90 capsule, Rfl: 2    atorvastatin (LIPITOR) 20 MG tablet, take 1 tablet by mouth at bedtime, Disp: 90 tablet, Rfl: 1    spironolactone (ALDACTONE) 50 MG tablet, take 1 tablet by mouth once daily, Disp: 90 tablet, Rfl: 3    vitamin D (ERGOCALCIFEROL) 1.25 MG (39710 UT) CAPS capsule, take 1 capsule by mouth every week, Disp: 12 capsule, Rfl: 1    nadolol (CORGARD) 20 MG tablet, Take 1 tablet by mouth daily, Disp: 30 tablet, Rfl: 3    rifaximin (XIFAXAN) 550 MG tablet, Take 1 tablet by mouth 3 times daily, Disp: 42 tablet, Rfl: 0    sildenafil (VIAGRA) 100 MG tablet, Take 1 tablet by mouth as needed for Erectile Dysfunction, Disp: 10 tablet, Rfl: 3    ALLERGIES:  Allergies   Allergen Reactions  Pollen Extract      Runny nose, watery eyes         REVIEW OF SYSTEMS:    A full 14 point review of systems was performed and assessed and found to be negative except as noted above. PHYSICAL EXAMINATION:    CHAPERONE: Family/friend/companieon Present    ECO Asymptomatic    VITAL SIGNS: /79   Pulse 98   Temp 97.9 °F (36.6 °C)   Resp 14   Wt 201 lb (91.2 kg)   SpO2 97%   BMI 28.84 kg/m²   GENERAL:  General appearance is that of a well-nourished, well-developed in no apparent distress. HEENT: Normocephalic, atraumatic, EOMI, moist mucosa, no erythema. NECK:  No adenopathy or a palpable thyroid mass, trachea is midline. LYMPHATICS: No cervical, supraclavicular adenopathy. HEART:  Regular rate and rhythm, S1, S2, no murmurs. LUNGS:  Clear to auscultation bilaterally with no wheezing or crackles. ABDOMEN:  Soft, nontender, non distended. EXTREMITIES:  No clubbing, cyanosis, or edema. No calf tenderness. MSK:  No CVA or spinal tenderness. NEUROLOGICAL: No focal deficits. CN II-XII intact. Strength and sensation intact bilaterally. SKIN: No erythema or desquamation. LABS:  WBC   Date Value Ref Range Status   2021 5.9 3.5 - 11.0 k/uL Final   2021 5.9 3.5 - 11.0 k/uL Final     Segs Absolute   Date Value Ref Range Status   2021 4.50 1.8 - 7.7 k/uL Final   2021 4.50 1.8 - 7.7 k/uL Final     Hemoglobin   Date Value Ref Range Status   2021 14.0 13.5 - 17.5 g/dL Final   2021 14.0 13.5 - 17.5 g/dL Final     Platelet Count   Date Value Ref Range Status   2012 308 140 - 450 k/uL Final     Platelets   Date Value Ref Range Status   2021 122 (L) 140 - 450 k/uL Final   2021 122 (L) 140 - 450 k/uL Final     No results found for: , CEA  PSA   Date Value Ref Range Status   2021 0.11 <4.1 ug/L Final     Comment:     The Roche \"ECLIA\" assay is used. Results obtained with different assay methods cannot be   used interchangeably. 2017 0.44 <4.1 ug/L Final     Comment:     The Roche \"ECLIA\" assay is used. Results obtained with different assay methods   cannot be used interchangeably. Performed at [facility] [address] [facility], [address] [place] [address] [phone]     2012 0.54 0 - 4 ug/L Final     Comment:     Performed at [address] [place] [address] 3 [phone]       IMAGIN21 PET Scan (Outside records scanned into Media folder)  IMPRESSION(S):   1. No metabolic evidence of active neoplasm. 2. Again seen is a rim calcified 3.5 cm cystic lesion abutting the   lesser curvature of the stomach and pancreatic tail. This could   represent a calcified pseudocyst versus gastric diverticulum. 3. Cirrhosis with sequela of portal hypertension including splenomegaly   (14.1 cm). ASSESSMENT AND PLAN:  Zahra Akers is a 57 y.o. male with a Cancer Staging  Malignant neoplasm of lower third of esophagus ([facility] [address]  Staging form: Esophagus - Adenocarcinoma, AJCC 8th Edition  - Clinical: Stage IIB (cT2, cN0, cM0) - Signed by Jennie Avendano MD on 3/18/2021  . Patient presents today for a follow-up visit approximately 2 months after completion of his chemoradiation therapy. Patient is overall doing well noting significant provement in his swallowing function. He is in preparation for his ultimate esophagectomy at [facility] and will be meeting with Dr. Cassandra Prado on September 3, 2021. In the interim however I do recommend patient follow-up with his gastroenterologist Dr. Enrico Van here in [place] for an EGD to assess his primary lesion. Patient is hesitant about surgery and the recovery process as well as is unsure if he will be medically operable given his liver disease. We agreed that a endoscopic evaluation and biopsy will help us understand the extent of residual disease to better decide on surgery.     Patient is recommended to come back in 2 months for another follow up visit and exam, or can call to come see us sooner if symptoms change. Patient was in agreement with my recommendations. All questions were answered to their satisfaction. Patient was advised to contact us anytime should they have any questions or concerns. Electronically signed by Jean Paul Diggs MD on 8/20/2021 at 3:19 PM        Medications Prescribed:   New Prescriptions    No medications on file       Orders:   Orders Placed This Encounter   Procedures    Comprehensive Metabolic Panel    CBC With Auto Differential   Jose Whiting MD, Gastroenterology, [place]       CC:  Patient Care Team:  Alvarez Gordon as PCP - General (Physician Assistant)  VASU Gordon as PCP - [address] [place] [address]  [facility] Provider  Jose A Nunes MD as Consulting Physician (Pain Management)  Ari Luis MD as Consulting Physician (Gastroenterology)  Carol Ha RN as Nurse Navigator (Oncology)  Amna Francis MD as Resident     Total time spent on this case on the day of encounter is more than 30 minutes. This time includes combination of one or more of the following - review of necessary tests, review of pertinent medical records from the EMR, performing medically appropriate examination and evaluation, counseling and educating the patient/family/caregiver, ordering necessary medical tests, procedures etc., documenting the clinical information in the electronic medical record, care coordination, referring and communicating with other health care providers and interpretation of results independently. This note is created with the assistance of a speech recognition program.  While intending to generate a document that actually reflects the content of the visit, the document can still have some errors including those of syntax and sound a like substitutions which may escape proof reading. It such instances, actual meaning can be extrapolated by contextual diversion.

## 2021-08-22 LAB
ALPHA-1 ANTITRYPSIN PHENOTYPE: NORMAL
ALPHA-1 ANTITRYPSIN: 152 MG/DL (ref 90–200)

## 2021-08-23 ENCOUNTER — OFFICE VISIT (OUTPATIENT)
Dept: GASTROENTEROLOGY | Age: 61
End: 2021-08-23
Payer: MEDICARE

## 2021-08-23 ENCOUNTER — HOSPITAL ENCOUNTER (OUTPATIENT)
Dept: PAIN MANAGEMENT | Facility: CLINIC | Age: 61
Discharge: HOME | End: 2021-08-23
Payer: MEDICARE

## 2021-08-23 VITALS
HEART RATE: 72 BPM | BODY MASS INDEX: 30.09 KG/M2 | WEIGHT: 210.2 LBS | HEIGHT: 70 IN | TEMPERATURE: 98.5 F | SYSTOLIC BLOOD PRESSURE: 125 MMHG | DIASTOLIC BLOOD PRESSURE: 75 MMHG | OXYGEN SATURATION: 90 %

## 2021-08-23 VITALS
TEMPERATURE: 98.5 F | WEIGHT: 210 LBS | OXYGEN SATURATION: 90 % | DIASTOLIC BLOOD PRESSURE: 75 MMHG | RESPIRATION RATE: 17 BRPM | HEART RATE: 72 BPM | HEIGHT: 70 IN | BODY MASS INDEX: 30.06 KG/M2 | SYSTOLIC BLOOD PRESSURE: 125 MMHG

## 2021-08-23 DIAGNOSIS — K31.89 PORTAL HYPERTENSIVE GASTROPATHY (HCC): ICD-10-CM

## 2021-08-23 DIAGNOSIS — R52 PAIN MANAGEMENT: ICD-10-CM

## 2021-08-23 DIAGNOSIS — K70.30 ALCOHOLIC CIRRHOSIS OF LIVER WITHOUT ASCITES (HCC): ICD-10-CM

## 2021-08-23 DIAGNOSIS — Z85.028 HISTORY OF STOMACH CANCER: Primary | ICD-10-CM

## 2021-08-23 DIAGNOSIS — K76.6 PORTAL HYPERTENSIVE GASTROPATHY (HCC): ICD-10-CM

## 2021-08-23 LAB
DIRECT EXAM: NORMAL
Lab: NORMAL
SPECIMEN DESCRIPTION: NORMAL

## 2021-08-23 PROCEDURE — 2500000003 HC RX 250 WO HCPCS: Performed by: ANESTHESIOLOGY

## 2021-08-23 PROCEDURE — 64484 NJX AA&/STRD TFRM EPI L/S EA: CPT

## 2021-08-23 PROCEDURE — 62323 NJX INTERLAMINAR LMBR/SAC: CPT | Performed by: ANESTHESIOLOGY

## 2021-08-23 PROCEDURE — 99152 MOD SED SAME PHYS/QHP 5/>YRS: CPT | Performed by: ANESTHESIOLOGY

## 2021-08-23 PROCEDURE — 99214 OFFICE O/P EST MOD 30 MIN: CPT | Performed by: INTERNAL MEDICINE

## 2021-08-23 PROCEDURE — 6360000002 HC RX W HCPCS: Performed by: ANESTHESIOLOGY

## 2021-08-23 PROCEDURE — 6360000004 HC RX CONTRAST MEDICATION: Performed by: ANESTHESIOLOGY

## 2021-08-23 PROCEDURE — 64483 NJX AA&/STRD TFRM EPI L/S 1: CPT

## 2021-08-23 RX ORDER — LIDOCAINE HYDROCHLORIDE 10 MG/ML
INJECTION, SOLUTION EPIDURAL; INFILTRATION; INTRACAUDAL; PERINEURAL
Status: COMPLETED | OUTPATIENT
Start: 2021-08-23 | End: 2021-08-23

## 2021-08-23 RX ORDER — DEXAMETHASONE SODIUM PHOSPHATE 10 MG/ML
INJECTION, SOLUTION INTRAMUSCULAR; INTRAVENOUS
Status: COMPLETED | OUTPATIENT
Start: 2021-08-23 | End: 2021-08-23

## 2021-08-23 RX ORDER — MIDAZOLAM HYDROCHLORIDE 2 MG/2ML
INJECTION, SOLUTION INTRAMUSCULAR; INTRAVENOUS
Status: COMPLETED | OUTPATIENT
Start: 2021-08-23 | End: 2021-08-23

## 2021-08-23 RX ORDER — FENTANYL CITRATE 50 UG/ML
INJECTION, SOLUTION INTRAMUSCULAR; INTRAVENOUS
Status: COMPLETED | OUTPATIENT
Start: 2021-08-23 | End: 2021-08-23

## 2021-08-23 RX ADMIN — LIDOCAINE HYDROCHLORIDE 5 ML: 10 INJECTION, SOLUTION EPIDURAL; INFILTRATION; INTRACAUDAL at 12:48

## 2021-08-23 RX ADMIN — IOHEXOL 6 ML: 180 INJECTION INTRAVENOUS at 12:49

## 2021-08-23 RX ADMIN — MIDAZOLAM HYDROCHLORIDE 1 MG: 1 INJECTION, SOLUTION INTRAMUSCULAR; INTRAVENOUS at 12:46

## 2021-08-23 RX ADMIN — FENTANYL CITRATE 50 MCG: 50 INJECTION INTRAMUSCULAR; INTRAVENOUS at 12:46

## 2021-08-23 RX ADMIN — DEXAMETHASONE SODIUM PHOSPHATE 10 MG: 10 INJECTION, SOLUTION INTRAMUSCULAR; INTRAVENOUS at 12:48

## 2021-08-23 ASSESSMENT — ENCOUNTER SYMPTOMS
ABDOMINAL PAIN: 0
SHORTNESS OF BREATH: 0
DIARRHEA: 0
CONSTIPATION: 0
ANAL BLEEDING: 0
COUGH: 0
RECTAL PAIN: 0
BLOOD IN STOOL: 0
ALLERGIC/IMMUNOLOGIC NEGATIVE: 1
RESPIRATORY NEGATIVE: 1
SORE THROAT: 0
GASTROINTESTINAL NEGATIVE: 1
NAUSEA: 0
TROUBLE SWALLOWING: 0
BACK PAIN: 1
ABDOMINAL DISTENTION: 0
CHOKING: 0
VOICE CHANGE: 0
ABDOMINAL PAIN: 1
BACK PAIN: 0
VOMITING: 0

## 2021-08-23 ASSESSMENT — PAIN - FUNCTIONAL ASSESSMENT
PAIN_FUNCTIONAL_ASSESSMENT: 0-10
PAIN_FUNCTIONAL_ASSESSMENT: 0-10

## 2021-08-23 NOTE — OP NOTE
Operative Note      Patient: Sharonda Quiroz  YOB: 1960  MRN: [id number]    Date of Procedure:     Pre-Op Diagnosis:   Chronic low back pain with left-sided sciatica    Post-Op Diagnosis: Same           * Surgery not found *    Assistant:   * Surgery not found *    Anesthesia: * No surgery found *    Estimated Blood Loss (mL): Minimal    Complications: None    Specimens:   * Cannot find log *    Implants:  * No surgical log found *      Drains: * No LDAs found *    Findings: n/a    Detailed Description of Procedure:     Patient Name: Sharonda Quiroz   YOB: 1960  Room/Bed: Room/bed info not found  Medical Record Number: [id number]  Date: 8/23/2021       Sedation/ Anesthesia Plan:   intravenous sedation   as needed. Medications Planned:   midazolam (Versed) / Fentanyl  Intravenously  as needed. Preoperative Diagnosis:   Chronic low back pain with left-sided sciatica    Postoperative Diagnosis:    Chronic low back pain with left-sided sciatica  At L4-L5    Procedure Performed:  Lumbar epidural steroid injection under fluoroscopy guidance    Blood Loss: None    Procedure: The Patient was seen in the preop area, chart was reviewed, informed consent was obtained. Patient was taken to procedure room and was placed in prone position. Vital signs were monitored through out the  Procedure. A time out was completed. The skin over the back was prepped and draped in sterile manner. The target point was marked at The interlaminar space at L4-L5. Skin and deep tissues were anesthetized with 1 % lidocaine. A 20-gauge Tuohy epidural needlele was advanced  under fluoroscopy guidance in AP view. Epidural space was identified using MED technique. Position ws confirmed in Lateral view. Then after negative aspiration contrast dye Omnipaque-180 was injected with live fluoroscopy in AP views that showed  spread of the contrast in the epidural space  and no vascular runoff or intrathecal spread. Finally 10 ml of treatment solution containing 5 ml of  1 % PF lidocaine and 1 ml of dexamethasone 10 mg / ml was injected. The needle was removed and a Band-Aid was placed over the needle  insertion site. The patient's vital signs remained stable and the patient tolerated the procedure well. Electronically signed by Lolis Yu MD on 8/23/2021 at 12:54 PM      SEDATION NOTE:    ASA CLASSIFICATION  3  MP   CLASSIFICATION  3    · Moderate intravenous conscious sedation was supervised by Dr. Elizabet Be  . The patient was independently monitored by a Registered Nurse assigned to the Procedure Room  . Monitoring included automated blood pressure, continuous EKG, Capnography and continuous pulse oximetry. . The detailed Conscious Record is permanently stored in the [place] [address].      The following is the conscious sedation record;  Start Time:  1241  End times:  1257  Duration:  16 MINUTES  MEDS GIVEN 1 MG VERSED AND 50 [address]      Electronically signed by Lolis Yu MD on 8/23/2021 at 12:54 PM

## 2021-08-23 NOTE — PROGRESS NOTES
GI OFFICE FOLLOW UP    INTERVAL HISTORY:   Jean Paul Diggs MD  [address] [facility] [address]    No chief complaint on file. No diagnosis found. HISTORY OF PRESENT ILLNESS: Lilliana Narvaez is a 57 y.o. 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 This was a stage T2 N0 MX tumor. This is a flat lesion. Patient had  EUS in February 2021. Following that looks like patient was evaluated at [address] [place] [address],[address] and he had chemoradiation therapy. At present patient is referred to evaluate the lesion so that further decisions can be made regarding management. Patient denies dysphagia. He has a fair appetite. No nausea vomiting. No melena or hematochezia. He is known to have chronic liver disease. He had history of hepatitis C and was treated in the past, and hepatitis C RNA was negative in 2019. Ultrasound of the liver in December 2020 revealed hepatic cirrhosis. Also was found to have gallbladder sludge and cholelithiasis. Also CT scan in March 2021 did reveal splenomegaly. Imaging studies in the past did reveal small esophageal varices and also fundal varix. 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 Patient also has portal hypertensive gastropathy. Recent labs revealed that his hepatitis C RNA on 8/19/2021 not detected. INR within normal limits. Platelet count is about 122. Past Medical,Family, and Social History reviewed and does contribute to the patient presenting condition. Patient's PMH/PSH,SH,PSYCH Hx, MEDs, ALLERGIES, and ROS were all reviewed and updated in the appropriate sections.  Yes      PAST MEDICAL HISTORY:  Past Medical History:   Diagnosis Date    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 ([facility] [address]     Alcohol abuse 6-12 beers a day; quit drinking July 2016    Arthritis     Back pain, chronic     dr. Scott Garcia, orthopedic, every 3-4 months, gets steroid injection    Lezama esophagus     BPH (benign prostatic hypertrophy)     Cholelithiasis     Cirrhosis ([facility] [address]     COVID-19 12/2020    pt reports he tested + while at [facility] DDD (degenerative disc disease), lumbar     Esophageal varices ([facility] [address]     Fatty liver     GERD (gastroesophageal reflux disease)     Hep C w/o coma, chronic ([facility] [address]     History of blood transfusion     History of colon polyps 2016    [place] (hard of hearing)     Hyperlipidemia     Hypertension     Stomach ulcer     hx of    Tobacco abuse     Tubular adenoma of colon 2016, 2018    Wears glasses        Past Surgical History:   Procedure Laterality Date    BUNIONECTOMY      twice on right side    BUNIONECTOMY Left     CARPAL TUNNEL RELEASE Right     COLONOSCOPY      at age 35    COLONOSCOPY  10/05/2016    polyps-pathology tubular adenoma, and abnormal looking mucosa right colon-pathology-tubular adenoma    COLONOSCOPY N/A 3/30/2018    COLONOSCOPY POLYPECTOMY COLD BIOPSY performed by Prema Velasquez MD at [place] [address]  03/30/2018    Small polyp in the sigmoid colon and excised with biopsy forceps--tubular adenoma    ENDOSCOPY, COLON, DIAGNOSTIC      EGD    IR PORT PLACEMENT EQUAL OR GREATER THAN 5 YEARS  4/19/2021    IR PORT PLACEMENT EQUAL OR GREATER THAN 5 YEARS 4/19/2021 [facility] PROCEDURES    KNEE SURGERY Left     cyst removed    NASAL SEPTUM SURGERY      NERVE BLOCK Right 11/23/2020    NERVE BLOCK RIGHT CERVICAL STEROID INJECTION  C3-C6 performed by Dylon Campos MD at [address]  01/04/16    steroid injection C7 T1    OTHER SURGICAL HISTORY  11/21/2016    Bilateral Lumbar CACHORRO L4-L5 injections    OTHER SURGICAL HISTORY  12/19/2016    lumbar steroid injection    OTHER SURGICAL HISTORY  09/28/2018    BILATERAL L5 CACHORRO (N/A Back)    OTHER SURGICAL HISTORY Right 11/23/2020    cervical injection    PAIN MANAGEMENT PROCEDURE Left 7/9/2020    EPIDURAL STEROID INJECTION LEFT L4 L5 performed by Ariel Horowitz MD at [address] [place] [address] Left 7/20/2020    LEFT L4 L5 EPIDURAL STEROID INJECTION performed by Ariel Horowitz MD at [address] [place] [address] Bilateral 8/17/2020    LUMBAR FACET BILATERAL L2-L5 performed by Ariel Horowitz MD at [address] [place] [address] Bilateral 12/7/2020    NERVE BLOCK BILATERAL LUMBAR MEDIAL BRANCH L2-L5 performed by Ariel Horowitz MD at [address] AA&/STRD TFRML EPI LUMBAR/SACRAL 1 LEVEL Bilateral 9/6/2018    BILATERAL L5 CACHORRO performed by Ariel Horowitz MD at [address] AA&/STRD TFRML EPI LUMBAR/SACRAL 1 LEVEL N/A 9/28/2018    BILATERAL L5 CACHORRO performed by Ariel Horowitz MD at [address]/CARPAL TUNNEL SURG Right 8/29/2017    CARPAL TUNNEL RELEASE RIGHT performed by Franc Alexis MD at [address]/CARPAL TUNNEL SURG Left 10/31/2017    CARPAL TUNNEL RELEASE performed by Franc Alexis MD at [address] [place] [address] 12/29/2020    EGD BIOPSY performed by Jayce Drake MD at [address] [place] [address] 2/2/2021    EGD BIOPSY and spot marking performed by Rene Benson MD at [address] [place] [address] 2/12/2021    ENDOSCOPIC ULTRASOUND, EGD performed by Loretta Tapia MD at [address] [place] [address]  2/12/2021    EGD DIAGNOSTIC ONLY performed by Loretta Tapia MD at [facility]:    Current Outpatient Medications:     traZODone (DESYREL) 50 MG tablet, take 1 tablet by mouth nightly, Disp: , Rfl:     HYDROcodone-acetaminophen (LORTAB)  MG per 15ML SOLN solution, TAKE 5 ML BY MOUTH EVERY 6 HOURS AS NEEDED FOR PAIN FOR UP TO 14 DAYS, Disp: , Rfl:     zolpidem (AMBIEN) 5 MG tablet, Take 5 mg by mouth nightly as needed for Sleep., Disp: , Rfl:     FLUoxetine (PROZAC) 20 MG capsule, Take 1 capsule by mouth daily, Disp: 30 capsule, Rfl: 3    omeprazole (PRILOSEC) 40 MG delayed release capsule, Take 1 capsule by mouth every morning (before breakfast), Disp: 30 capsule, Rfl: 2    lidocaine-prilocaine (EMLA) 2.5-2.5 % cream, Apply topically 45-60 mins prior to needle poke daily PRN, Disp: 1 Tube, Rfl: 2    prochlorperazine (COMPAZINE) 10 MG tablet, Take 1 tablet by mouth every 6 hours as needed (nausea vomiting), Disp: 120 tablet, Rfl: 3    ondansetron (ZOFRAN-ODT) 8 MG TBDP disintegrating tablet, Place 1 tablet under the tongue 3 times daily as needed for Nausea or Vomiting, Disp: 90 tablet, Rfl: 2    dexamethasone (DECADRON) 4 MG tablet, Take 5 tabs (20 mg) 12 and 6 hours before scheduled PACLItaxel (Taxol) infusion. , Disp: 30 tablet, Rfl: 2    lactulose (CHRONULAC) 10 GM/15ML solution, Take 15 mLs by mouth 4 times daily as needed, Disp: , Rfl:     D3-50 1.25 MG (07668 UT) CAPS, once a week On Tuesdays, Disp: , Rfl:     hydrOXYzine (VISTARIL) 25 MG capsule, take 1 capsule by mouth three times a day if needed for anxiety, Disp: 90 capsule, Rfl: 2    atorvastatin (LIPITOR) 20 MG tablet, take 1 tablet by mouth at bedtime, Disp: 90 tablet, Rfl: 1    spironolactone (ALDACTONE) 50 MG tablet, take 1 tablet by mouth once daily, Disp: 90 tablet, Rfl: 3    vitamin D (ERGOCALCIFEROL) 1.25 MG (15707 UT) CAPS capsule, take 1 capsule by mouth every week, Disp: 12 capsule, Rfl: 1    nadolol (CORGARD) 20 MG tablet, Take 1 tablet by mouth daily, Disp: 30 tablet, Rfl: 3    rifaximin (XIFAXAN) 550 MG tablet, Take 1 tablet by mouth 3 times daily, Disp: 42 tablet, Rfl: 0    sildenafil (VIAGRA) 100 MG tablet, Take 1 tablet by mouth as needed for Erectile Dysfunction, Disp: 10 tablet, Rfl: 3    ALLERGIES:   Allergies   Allergen Reactions    Pollen Extract      Runny nose, watery eyes       FAMILY HISTORY:       Problem Relation Age of Onset   [employer] Cancer Mother         pancreatic    Cancer Father         bone    Diabetes Sister     Asthma Brother          SOCIAL HISTORY:   Social History     Socioeconomic History    Marital status: Single     Spouse name: Not on file    Number of children: Not on file    Years of education: Not on file    Highest education level: Not on file   Occupational History    Not on file   Tobacco Use    Smoking status: Former Smoker     Packs/day: 1.00     Years: 45.00     Pack years: 45.00     Quit date: 2017     Years since quittin.6    Smokeless tobacco: Never Used   Vaping Use    Vaping Use: Never used   Substance and Sexual Activity    Alcohol use: No     Comment: 2019    Drug use: No     Frequency: 1.0 times per week     Comment: cocaine,  stopped spring 2016    Sexual activity: Yes     Partners: Female   Other Topics Concern    Not on file   Social History Narrative     in the past, retired     Social Determinants of Health     Financial Resource Strain: Low Risk     Difficulty of Paying Living Expenses: Not hard at all   Food Insecurity: No Food Insecurity    Worried About [address] [employer] in the Last Year: Never true   [address] in the Last Year: Never true   Transportation Needs: No Transportation Needs    Lack of Transportation (Medical): No    Lack of Transportation (Non-Medical):  No   Physical Activity:     Days of Exercise per Week:     Minutes of Exercise per Session:    Stress:     Feeling of Stress :    Social Connections:     Frequency of Communication with Friends and Family:     Frequency of Social Gatherings with Friends and Family:     Attends Gnosticism Services:     Active Member of Clubs or Organizations:     Attends Club or Organization Meetings:     Marital Status:    Intimate Partner Violence:     Fear of Current or Ex-Partner:     Emotionally Abused:     Physically Abused:     Sexually Abused:          REVIEW OF SYSTEMS:         Review of Systems   Constitutional: Positive for appetite change, fatigue and unexpected weight change. HENT: Positive for hearing loss (rt ear). Negative for sore throat, trouble swallowing and voice change. Eyes: Positive for visual disturbance (wears glasses). Respiratory: Negative. Negative for cough, choking and shortness of breath. Cardiovascular: Negative. Negative for chest pain and leg swelling. Gastrointestinal: Negative. Negative for abdominal distention, abdominal pain, anal bleeding, blood in stool, constipation, diarrhea, nausea, rectal pain and vomiting. Denies   Endocrine: Negative. Negative for polydipsia, polyphagia and polyuria. Genitourinary: Negative for difficulty urinating, frequency, hematuria and urgency. Musculoskeletal: Negative for back pain, joint swelling and myalgias. Skin: Negative. Allergic/Immunologic: Negative. Negative for environmental allergies, food allergies and immunocompromised state. Neurological: Positive for weakness. Negative for dizziness, tremors, light-headedness, numbness and headaches. Hematological: Negative. Does not bruise/bleed easily. Psychiatric/Behavioral: Negative for sleep disturbance. The patient is not nervous/anxious. PHYSICAL EXAMINATION:     Vital signs reviewed per the nursing documentation. /75   Pulse 72   Temp 98.5 °F (36.9 °C)   Ht 5' 10\" (1.778 m)   Wt 210 lb 3.2 oz (95.3 kg)   SpO2 90%   BMI 30.16 kg/m²   Body mass index is 30.16 kg/m². Physical Exam  Vitals reviewed. Constitutional:       Appearance: Normal appearance. HENT:      Head: Normocephalic and atraumatic. Eyes:      Extraocular Movements: Extraocular movements intact. Conjunctiva/sclera: Conjunctivae normal.   Cardiovascular:      Rate and Rhythm: Normal rate and regular rhythm. Heart sounds: Normal heart sounds.    Pulmonary:      Effort: Pulmonary effort is normal.      Breath sounds: Normal breath sounds. Abdominal:      General: Bowel sounds are normal. There is no distension. Palpations: Abdomen is soft. There is no mass. Tenderness: There is no abdominal tenderness. There is no guarding. Hernia: No hernia is present. Skin:     General: Skin is warm and dry. Neurological:      General: No focal deficit present. Mental Status: He is alert and oriented to person, place, and time.    Psychiatric:         Mood and Affect: Mood normal.         Behavior: Behavior normal.           LABORATORY DATA: Reviewed  Lab Results   Component Value Date    WBC 5.9 08/19/2021    WBC 5.9 08/19/2021    HGB 14.0 08/19/2021    HGB 14.0 08/19/2021    HCT 41.7 08/19/2021    HCT 41.7 08/19/2021    MCV 97.2 08/19/2021    MCV 97.2 08/19/2021     (L) 08/19/2021     (L) 08/19/2021     08/19/2021     08/19/2021    K 4.4 08/19/2021    K 4.4 08/19/2021     08/19/2021     08/19/2021    CO2 26 08/19/2021    CO2 26 08/19/2021    BUN 5 (L) 08/19/2021    BUN 5 (L) 08/19/2021    CREATININE 0.53 (L) 08/19/2021    CREATININE 0.53 (L) 08/19/2021    LABPROT 7.7 04/19/2012    LABALBU 3.4 (L) 08/19/2021    LABALBU 3.4 (L) 08/19/2021    BILITOT 1.32 (H) 08/19/2021    BILITOT 1.32 (H) 08/19/2021    ALKPHOS 75 08/19/2021    ALKPHOS 75 08/19/2021    AST 39 08/19/2021    AST 39 08/19/2021    ALT 10 08/19/2021    ALT 10 08/19/2021    INR 1.1 08/19/2021         Lab Results   Component Value Date    RBC 4.29 (L) 08/19/2021    RBC 4.29 (L) 08/19/2021    HGB 14.0 08/19/2021    HGB 14.0 08/19/2021    MCV 97.2 08/19/2021    MCV 97.2 08/19/2021    MCH 32.5 08/19/2021    MCH 32.5 08/19/2021    MCHC 33.5 08/19/2021    MCHC 33.5 08/19/2021    RDW 13.6 08/19/2021    RDW 13.6 08/19/2021    MPV 8.1 08/19/2021    MPV 8.1 08/19/2021    BASOPCT 0 08/19/2021    BASOPCT 0 08/19/2021    LYMPHSABS 0.70 (L) 08/19/2021    LYMPHSABS 0.70 (L) 08/19/2021    MONOSABS 0.60 08/19/2021    MONOSABS 0.60 08/19/2021    NEUTROABS 4.50 08/19/2021    NEUTROABS 4.50 08/19/2021    EOSABS 0.10 08/19/2021    EOSABS 0.10 08/19/2021    BASOSABS 0.00 08/19/2021    BASOSABS 0.00 08/19/2021         DIAGNOSTIC TESTING:     FLUORO FOR SURGICAL PROCEDURES    Result Date: 8/23/2021  Radiology result is complete; follow up with provider / physician office for radiology results          Assessment  No diagnosis found. Plan  At present patient is stable. He completed chemoradiation therapy. He may need EGD to evaluate the status of the small flat lesion in the lower esophagus/hiatal hernia. Discussed with the patient and wife regarding the procedure, risks and benefits, they understood and agreed. Patient may be at high risk for surgical resection given signs of portal hypertension chronic liver disease. Thank you for allowing me to participate in the care of Mr. Claire Gerardo. For any further questions please do not hesitate to contact me. I have reviewed and agree with the ROS entered by the MA/LPN. Note is dictated utilizing voice recognition software. Unfortunately this leads to occasional typographical errors.  Please contact our office if you have any questions        Charline Cruz MD,FACP, [facility]  Board Certified in Gastroenterology and [address] [place] [address] Gastroenterology  Office #: [phone]

## 2021-08-24 ENCOUNTER — TELEPHONE (OUTPATIENT)
Dept: GASTROENTEROLOGY | Age: 61
End: 2021-08-24

## 2021-08-25 ENCOUNTER — HOSPITAL ENCOUNTER (OUTPATIENT)
Dept: PREADMISSION TESTING | Age: 61
Discharge: HOME | End: 2021-08-29
Payer: MEDICARE

## 2021-08-25 VITALS — HEIGHT: 70 IN | BODY MASS INDEX: 29.2 KG/M2 | WEIGHT: 204 LBS

## 2021-08-25 NOTE — PROGRESS NOTES
Pre-op Instructions For Out-Patient Endoscopy Surgery    Medication Instructions:  · Please stop herbs and any supplements now (includes vitamins and minerals). · Please contact your surgeon and prescribing physician for pre-op instructions for any blood thinners. · Please take the following medications the morning of your surgery with a sip of water:    None    Surgery Instructions:  1. After midnight before surgery:  Do not eat or drink anything, including water, mints, gum, and hard candy. You may brush your teeth without swallowing. No smoking, chewing tobacco, or street drugs. 2. Please shower or bathe before surgery. 3. Please do not wear any cologne, lotion, powder, jewelry, piercings, perfume, makeup, nail polish, hair accessories, or hair spray on the day of surgery. Wear loose comfortable clothing. 4. Leave your valuables at home. Bring a storage case for any glasses/contacts. 5. An adult who is responsible for you MUST drive you home and should be with you for the first 24 hours after surgery. The Day of Surgery:  · Arrive at [address] [place] [address] Surgery Entrance at the time directed by your surgeon and check in at the desk. · If you have a living will or healthcare power of , please bring a copy. · You will be taken to the pre-op holding area where you will be prepared for surgery. A physical assessment will be performed by a nurse practitioner or house officer. Your IV will be started and you will meet your anesthesiologist.    · When you go to surgery, your family will be directed to the surgical waiting room, where the doctor should speak with them after your surgery. · After surgery, you will be taken to the recovery room then when you are awake and stable you will go to the short stay unit for preparation to be discharged. Only your one designated person is allowed to come to short stay for your discharge. Normal vision: sees adequately in most situations; can see medication labels, newsprint

## 2021-08-26 ENCOUNTER — TELEPHONE (OUTPATIENT)
Dept: ONCOLOGY | Age: 61
End: 2021-08-26

## 2021-08-30 ENCOUNTER — ANESTHESIA EVENT (OUTPATIENT)
Dept: ENDOSCOPY | Age: 61
End: 2021-08-30
Payer: MEDICARE

## 2021-08-31 ENCOUNTER — HOSPITAL ENCOUNTER (OUTPATIENT)
Age: 61
Setting detail: OUTPATIENT SURGERY
Discharge: HOME | End: 2021-08-31
Attending: INTERNAL MEDICINE | Admitting: INTERNAL MEDICINE
Payer: MEDICARE

## 2021-08-31 ENCOUNTER — ANESTHESIA (OUTPATIENT)
Dept: ENDOSCOPY | Age: 61
End: 2021-08-31
Payer: MEDICARE

## 2021-08-31 VITALS
HEIGHT: 70 IN | RESPIRATION RATE: 16 BRPM | WEIGHT: 204 LBS | OXYGEN SATURATION: 92 % | DIASTOLIC BLOOD PRESSURE: 83 MMHG | SYSTOLIC BLOOD PRESSURE: 160 MMHG | HEART RATE: 95 BPM | BODY MASS INDEX: 29.2 KG/M2 | TEMPERATURE: 97.4 F

## 2021-08-31 VITALS — DIASTOLIC BLOOD PRESSURE: 70 MMHG | OXYGEN SATURATION: 96 % | SYSTOLIC BLOOD PRESSURE: 114 MMHG

## 2021-08-31 PROCEDURE — 2580000003 HC RX 258: Performed by: ANESTHESIOLOGY

## 2021-08-31 PROCEDURE — 2500000003 HC RX 250 WO HCPCS: Performed by: NURSE ANESTHETIST, CERTIFIED REGISTERED

## 2021-08-31 PROCEDURE — 7100000000 HC PACU RECOVERY - FIRST 15 MIN: Performed by: INTERNAL MEDICINE

## 2021-08-31 PROCEDURE — 3700000000 HC ANESTHESIA ATTENDED CARE: Performed by: INTERNAL MEDICINE

## 2021-08-31 PROCEDURE — 3609012400 HC EGD TRANSORAL BIOPSY SINGLE/MULTIPLE: Performed by: INTERNAL MEDICINE

## 2021-08-31 PROCEDURE — 43239 EGD BIOPSY SINGLE/MULTIPLE: CPT | Performed by: INTERNAL MEDICINE

## 2021-08-31 PROCEDURE — 6360000002 HC RX W HCPCS: Performed by: NURSE ANESTHETIST, CERTIFIED REGISTERED

## 2021-08-31 PROCEDURE — 88305 TISSUE EXAM BY PATHOLOGIST: CPT

## 2021-08-31 PROCEDURE — 7100000001 HC PACU RECOVERY - ADDTL 15 MIN: Performed by: INTERNAL MEDICINE

## 2021-08-31 PROCEDURE — 2709999900 HC NON-CHARGEABLE SUPPLY: Performed by: INTERNAL MEDICINE

## 2021-08-31 PROCEDURE — 3700000001 HC ADD 15 MINUTES (ANESTHESIA): Performed by: INTERNAL MEDICINE

## 2021-08-31 RX ORDER — SODIUM CHLORIDE, SODIUM LACTATE, POTASSIUM CHLORIDE, CALCIUM CHLORIDE 600; 310; 30; 20 MG/100ML; MG/100ML; MG/100ML; MG/100ML
INJECTION, SOLUTION INTRAVENOUS CONTINUOUS
Status: DISCONTINUED | OUTPATIENT
Start: 2021-08-31 | End: 2021-08-31 | Stop reason: HOSPADM

## 2021-08-31 RX ORDER — LABETALOL HYDROCHLORIDE 5 MG/ML
5 INJECTION, SOLUTION INTRAVENOUS EVERY 10 MIN PRN
Status: DISCONTINUED | OUTPATIENT
Start: 2021-08-31 | End: 2021-08-31 | Stop reason: HOSPADM

## 2021-08-31 RX ORDER — DIPHENHYDRAMINE HYDROCHLORIDE 50 MG/ML
12.5 INJECTION INTRAMUSCULAR; INTRAVENOUS
Status: DISCONTINUED | OUTPATIENT
Start: 2021-08-31 | End: 2021-08-31 | Stop reason: HOSPADM

## 2021-08-31 RX ORDER — SODIUM CHLORIDE 9 MG/ML
25 INJECTION, SOLUTION INTRAVENOUS PRN
Status: DISCONTINUED | OUTPATIENT
Start: 2021-08-31 | End: 2021-08-31 | Stop reason: HOSPADM

## 2021-08-31 RX ORDER — ONDANSETRON 2 MG/ML
4 INJECTION INTRAMUSCULAR; INTRAVENOUS
Status: DISCONTINUED | OUTPATIENT
Start: 2021-08-31 | End: 2021-08-31 | Stop reason: HOSPADM

## 2021-08-31 RX ORDER — MEPERIDINE HYDROCHLORIDE 25 MG/ML
12.5 INJECTION INTRAMUSCULAR; INTRAVENOUS; SUBCUTANEOUS EVERY 5 MIN PRN
Status: DISCONTINUED | OUTPATIENT
Start: 2021-08-31 | End: 2021-08-31 | Stop reason: HOSPADM

## 2021-08-31 RX ORDER — PROPOFOL 10 MG/ML
INJECTION, EMULSION INTRAVENOUS PRN
Status: DISCONTINUED | OUTPATIENT
Start: 2021-08-31 | End: 2021-08-31 | Stop reason: SDUPTHER

## 2021-08-31 RX ORDER — LIDOCAINE HYDROCHLORIDE 10 MG/ML
INJECTION, SOLUTION EPIDURAL; INFILTRATION; INTRACAUDAL; PERINEURAL PRN
Status: DISCONTINUED | OUTPATIENT
Start: 2021-08-31 | End: 2021-08-31 | Stop reason: SDUPTHER

## 2021-08-31 RX ORDER — SODIUM CHLORIDE 0.9 % (FLUSH) 0.9 %
10 SYRINGE (ML) INJECTION PRN
Status: DISCONTINUED | OUTPATIENT
Start: 2021-08-31 | End: 2021-08-31 | Stop reason: HOSPADM

## 2021-08-31 RX ORDER — MORPHINE SULFATE 2 MG/ML
2 INJECTION, SOLUTION INTRAMUSCULAR; INTRAVENOUS EVERY 5 MIN PRN
Status: DISCONTINUED | OUTPATIENT
Start: 2021-08-31 | End: 2021-08-31 | Stop reason: HOSPADM

## 2021-08-31 RX ADMIN — PROPOFOL 250 MG: 10 INJECTION, EMULSION INTRAVENOUS at 08:55

## 2021-08-31 RX ADMIN — SODIUM CHLORIDE, POTASSIUM CHLORIDE, SODIUM LACTATE AND CALCIUM CHLORIDE: 600; 310; 30; 20 INJECTION, SOLUTION INTRAVENOUS at 08:05

## 2021-08-31 RX ADMIN — LIDOCAINE HYDROCHLORIDE 50 MG: 10 INJECTION, SOLUTION EPIDURAL; INFILTRATION; INTRACAUDAL; PERINEURAL at 08:55

## 2021-08-31 ASSESSMENT — PULMONARY FUNCTION TESTS
PIF_VALUE: 1
PIF_VALUE: 2
PIF_VALUE: 1
PIF_VALUE: 1

## 2021-08-31 ASSESSMENT — PAIN - FUNCTIONAL ASSESSMENT: PAIN_FUNCTIONAL_ASSESSMENT: 0-10

## 2021-08-31 ASSESSMENT — LIFESTYLE VARIABLES: SMOKING_STATUS: 0

## 2021-08-31 ASSESSMENT — ENCOUNTER SYMPTOMS
SHORTNESS OF BREATH: 0
STRIDOR: 0

## 2021-08-31 ASSESSMENT — PAIN SCALES - GENERAL: PAINLEVEL_OUTOF10: 0

## 2021-08-31 NOTE — ANESTHESIA POSTPROCEDURE EVALUATION
POST- ANESTHESIA EVALUATION       Pt Name: Ela Garcia  MRN: [id number]  YOB: 1960  Date of evaluation: 8/31/2021  Time:  10:21 AM      BP (!) 160/83   Pulse 95   Temp 97.4 °F (36.3 °C)   Resp 16   Ht 5' 10\" (1.778 m)   Wt 204 lb (92.5 kg)   SpO2 92%   BMI 29.27 kg/m²      Consciousness Level  Awake  Cardiopulmonary Status  Stable  Pain Adequately Treated YES  Nausea / Vomiting  NO  Adequate Hydration  YES  Anesthesia Related Complications NONE      Electronically signed by Yomi Sauer MD on 8/31/2021 at 10:21 AM       Department of Anesthesiology  Postprocedure Note    Patient: Ela Garcia  MRN: [id number]  YOB: 1960  Date of evaluation: 8/31/2021  Time:  10:20 AM     Procedure Summary     Date: 08/31/21 Room / Location: [place] [address] / [facility]    Anesthesia Start: 7339 Anesthesia Stop: 3182    Procedure: EGD BIOPSY (N/A Esophagus) Diagnosis: (HISTORY OF STOMACH CANCER (PT HAS HAD COVID VACCINE DONE))    Surgeons: Orlando Freeman MD Responsible Provider: Yomi Sauer MD    Anesthesia Type: general ASA Status: 3          Anesthesia Type: general    Yen Phase I: Yen Score: 10    Yen Phase II:      Last vitals: Reviewed and per EMR flowsheets.        Anesthesia Post Evaluation

## 2021-08-31 NOTE — H&P
HISTORY and [address]       NAME:  Salvador Sarah  MRN: [id number]   YOB: 1960   Date: 8/31/2021   Age: 57 y.o. Gender: male       COMPLAINT AND PRESENT HISTORY:                Salvador Sarah is 57 y.o.,  male, undergoing for EGD. Patient has had multiple endoscopies done, he states that this is # 4. Patient denies any FH of Esophogeal Cancer. Patient has hx of Esophageal  Cancer with onset of a year ago. Patient has had chemo and radiation. Patient denies any heartburn. Patient denies any hoarseness or changes in voice. Pt has hx of GERD and   Lezama's Esophagus  and is taking Prilosec  in throat; drinking fluids help. Pt admits to regurgitation. Patient denies any Dysphagia. \" I feel good\". Patient denies any  epigastric pains. no nausea and no vomiting . Patient denies any indigestion, difficulty swallowing  or heartburn. No diarrhea / constipation, no changes in the color, caliber or consistency of the stools. Hx of any significant  medical hx:  HTN, HLD, HEP C -treated. Hx of smoking: no   Patient has been NPO since midnight. No blood thinners in the past 5-7 days. No cp or sob. No fever or chills   Pt denies any issues with Anesthesia. No fever or chills.       PAST MEDICAL HISTORY     Past Medical History:   Diagnosis Date    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 Jazzmine Burr, orthopedic, every 3-4 months, gets steroid injection    Lezama esophagus     BPH (benign prostatic hypertrophy)     Cholelithiasis     Cirrhosis ([address]     COVID-19 12/2020    pt reports he had a positive test while at [facility] in 2020, was asymptomatic    COVID-19 vaccine series completed 5/20/2021, 6/22/2021    Moderna 5/20/2021, 6/22/2021    DDD (degenerative disc disease), lumbar     Depression     Esophageal cancer (HCC)     INVASIVE ADENOCARCINOMA ARISING IN TUBULAR ADENOMA WITH HIGH GRADE DYSPLASIA, ASSOCIATED WITH FOCAL INTESTINAL METAPLASIA     Esophageal varices (HCC)     Fatty liver     GERD (gastroesophageal reflux disease)     Hep C w/o coma, chronic (HCC)     History of alcohol abuse     6-12 beers a day; quit drinking July 2016    History of blood transfusion     History of colon polyps 2016    History of tobacco abuse     [place] (hard of hearing)     Hyperlipidemia     Hypertension     Port-A-Cath in place     right upper chest    Stomach ulcer     hx of    Tubular adenoma of colon 2016, 2018    Vitamin D deficiency     Wears glasses        SURGICAL HISTORY       Past Surgical History:   Procedure Laterality Date    BUNIONECTOMY      twice on right side    BUNIONECTOMY Left     CARPAL TUNNEL RELEASE Right     COLONOSCOPY      at age 35    COLONOSCOPY  10/05/2016    polyps-pathology tubular adenoma, and abnormal looking mucosa right colon-pathology-tubular adenoma    COLONOSCOPY N/A 3/30/2018    COLONOSCOPY POLYPECTOMY COLD BIOPSY performed by Laurita Kendall MD at [place] [address]  03/30/2018    Small polyp in the sigmoid colon and excised with biopsy forceps--tubular adenoma    ENDOSCOPY, COLON, DIAGNOSTIC      EGD    IR PORT PLACEMENT EQUAL OR GREATER THAN 5 YEARS  4/19/2021    IR PORT PLACEMENT EQUAL OR GREATER THAN 5 YEARS 4/19/2021 [facility] PROCED[facility]    KNEE SURGERY Left     cyst removed    NASAL SEPTUM SURGERY      NERVE BLOCK Right 11/23/2020    NERVE BLOCK RIGHT CERVICAL STEROID INJECTION  C3-C6 performed by Jo Jorge MD at [address] [place] [address]  01/04/16    steroid injection C7 T1    OTHER SURGICAL HISTORY  11/21/2016    Bilateral Lumbar CACOHRRO L4-L5 injections    OTHER SURGICAL HISTORY  12/19/2016    lumbar steroid injection    OTHER SURGICAL HISTORY  09/28/2018    BILATERAL L5 CACHORRO (N/A Back)    OTHER SURGICAL HISTORY Right 11/23/2020    cervical injection    PAIN MANAGEMENT PROCEDURE Left 7/9/2020 EPIDURAL STEROID INJECTION LEFT L4 L5 performed by Felipe Grant MD at [address] [facility] Left 7/20/2020    LEFT L4 L5 EPIDURAL STEROID INJECTION performed by Felipe Grant MD at [address] [place] [address] Bilateral 8/17/2020    LUMBAR FACET BILATERAL L2-L5 performed by Felipe Grant MD at [address] [place] [address] Bilateral 12/7/2020    NERVE BLOCK BILATERAL LUMBAR MEDIAL BRANCH L2-L5 performed by Felipe Grant MD at [address] AA&/STRD TFRML EPI LUMBAR/SACRAL 1 LEVEL Bilateral 9/6/2018    BILATERAL L5 CACHORRO performed by Felipe Grant MD at [address] AA&/STRD TFRML EPI LUMBAR/SACRAL 1 LEVEL N/A 9/28/2018    BILATERAL L5 CACHORRO performed by Felipe Grant MD at [address] [facility] N/CARPAL TUNNEL SURG Right 8/29/2017    CARPAL TUNNEL RELEASE RIGHT performed by Wayne Eng MD at [address] [facility] N/CARPAL TUNNEL SURG Left 10/31/2017    CARPAL TUNNEL RELEASE performed by Wayne Eng MD at [facility] N/A 12/29/2020    EGD BIOPSY performed by Paige Peña MD at [facility] N/A 2/2/2021    EGD BIOPSY and spot marking performed by Radha Hernandez MD at [facility] N/A 2/12/2021    ENDOSCOPIC ULTRASOUND, EGD performed by Willie Jiang MD at [address] [place] [address]  2/12/2021    EGD DIAGNOSTIC ONLY performed by Willie Jiang MD at [facility]    WISDOM TOOTH EXTRACTION         FAMILY HISTORY       Family History   Problem Relation Age of Onset    Cancer Mother         pancreatic    Cancer Father         bone    Diabetes Sister     Asthma Brother        SOCIAL HISTORY       Social History     Socioeconomic History    Marital status: Single     Spouse name: Not on file    Number of children: Not on file    Years of education: Not on file    Highest education level: Not on file Occupational History    Not on file   Tobacco Use    Smoking status: Former Smoker     Packs/day: 1.00     Years: 45.00     Pack years: 45.00     Quit date: 2017     Years since quittin.6    Smokeless tobacco: Never Used   Vaping Use    Vaping Use: Never used   Substance and Sexual Activity    Alcohol use: Not Currently     Comment: quit     Drug use: Not Currently     Frequency: 1.0 times per week     Comment: cocaine,  stopped spring 2016    Sexual activity: Yes     Partners: Female   Other Topics Concern    Not on file   Social History Narrative     in the past, retired     Social Determinants of Health     Financial Resource Strain: [address] Difficulty of Paying Living Expenses: Not hard at all   Food Insecurity: No Food Insecurity    Worried About [address] in the Last Year: Never true   [email or web address] in the Last Year: Never true   Transportation Needs: No Transportation Needs    Lack of Transportation (Medical): No    Lack of Transportation (Non-Medical): No   Physical Activity:     Days of Exercise per Week:     Minutes of Exercise per Session:    Stress:     Feeling of Stress :    Social Connections:     Frequency of Communication with Friends and Family:     Frequency of Social Gatherings with Friends and Family:     Attends Latter day Services:     Active Member of Clubs or Organizations:     Attends Club or Organization Meetings:     Marital Status:    Intimate Partner Violence:     Fear of Current or Ex-Partner:     Emotionally Abused:     Physically Abused:     Sexually Abused:            REVIEW OF SYSTEMS      Allergies   Allergen Reactions    Pollen Extract      Runny nose, watery eyes       No current facility-administered medications on file prior to encounter.      Current Outpatient Medications on File Prior to Encounter   Medication Sig Dispense Refill    traZODone (DESYREL) 50 MG tablet take 1 tablet by mouth nightly      FLUoxetine (PROZAC) 20 MG capsule Take 1 capsule by mouth daily 30 capsule 3    omeprazole (PRILOSEC) 40 MG delayed release capsule Take 1 capsule by mouth every morning (before breakfast) 30 capsule 2    lidocaine-prilocaine (EMLA) 2.5-2.5 % cream Apply topically 45-60 mins prior to needle poke daily PRN 1 Tube 2    prochlorperazine (COMPAZINE) 10 MG tablet Take 1 tablet by mouth every 6 hours as needed (nausea vomiting) 120 tablet 3    ondansetron (ZOFRAN-ODT) 8 MG TBDP disintegrating tablet Place 1 tablet under the tongue 3 times daily as needed for Nausea or Vomiting 90 tablet 2    D3-50 1.25 MG (43576 UT) CAPS once a week On Tuesdays      hydrOXYzine (VISTARIL) 25 MG capsule take 1 capsule by mouth three times a day if needed for anxiety 90 capsule 2    atorvastatin (LIPITOR) 20 MG tablet take 1 tablet by mouth at bedtime 90 tablet 1    spironolactone (ALDACTONE) 50 MG tablet take 1 tablet by mouth once daily 90 tablet 3    vitamin D (ERGOCALCIFEROL) 1.25 MG (95453 UT) CAPS capsule take 1 capsule by mouth every week 12 capsule 1       Negative except for what is mentioned in the HPI. GENERAL PHYSICAL EXAM     Vitals :   See vital signs in RN flow sheet. GENERAL APPEARANCE:   Dorcas Ortiz is 57 y.o.,  male, moderately obese, nourished, conscious, alert. Does not appear to be distress or pain at this time. SKIN:  Warm, dry, no cyanosis or jaundice. HEAD:  Normocephalic, atraumatic, no swelling or tenderness. EYES:  Pupils equal, reactive to light. EARS:  No discharge, no marked hearing loss. NOSE:  No rhinorrhea, epistaxis or septal deformity. THROAT:  Not congested. No ulceration bleeding or discharge. NECK:  No stiffness, trachea central.  No palpable masses or L.N.                 CHEST:  Symmetrical and equal on expansion. HEART:  RRR S1 > S2.  No audible murmurs or gallops. LUNGS:  Equal on expansion, normal breath sounds. No adventitious sounds. ABDOMEN:  Obese. Soft on palpation. No dysphagia, No localized tenderness. No guarding or rigidity. No palpable hepatosplenomegaly. LYMPHATICS:  No palpable cervical lymphadenopathy. LOCOMOTOR, BACK AND SPINE:  No tenderness or deformities. EXTREMITIES:  Symmetrical, no pretibial edema. Sebastiens sign negative. No discoloration or ulcerations. NEUROLOGIC:  The patient is conscious, alert, oriented,Cranial nerve II-XII intact, taste and smell were not examined. No apparent focal sensory or motor deficits.              PROVISIONAL DIAGNOSES / SURGERY:      HISTORY OF STOMACH CANCER    EGD ESOPHAGOGASTRODUODENOSCOPY      Patient Active Problem List    Diagnosis Date Noted    Current smoker 04/05/2021    COVID-19 02/23/2021    Anxiety 02/23/2021    Malignant neoplasm of lower third of esophagus ([address]     Hypocalcemia 12/26/2020    Hypophosphatemia 12/26/2020    Gastrointestinal hemorrhage with melena     Alcohol abuse     Altered mental status     Acute gastrointestinal bleeding 12/23/2020    Thrombocytopenia ([address] 12/23/2020    Hepatitis C virus infection resolved after antiviral drug therapy 12/23/2020    Cervical facet syndrome 11/23/2020    Lumbar facet joint syndrome 08/17/2020    Elevated LFTs 08/12/2020    Seasonal allergies 08/12/2020    S/P epidural steroid injection 08/05/2020    Essential hypertension 04/24/2019    Recurrent major depressive disorder in partial remission ([address] 04/24/2019    Pure hypercholesterolemia 02/04/2019    Hypokalemia 02/04/2019    Vitamin D deficiency 09/20/2017    History of hepatitis C 09/11/2017    Ganglion cyst 05/31/2017    Carpal tunnel syndrome of right wrist 05/31/2017    Tinnitus 03/23/2017    Eustachian tube dysfunction 03/23/2017    Lumbar radiculitis 11/08/2016    Lumbar disc herniation 11/08/2016    Gynecomastia, male 10/26/2016    Depression 10/13/2016    Chronic bilateral low back pain with left-sided sciatica 10/06/2016    DDD (degenerative disc disease), lumbar 10/06/2016    Hepatic cirrhosis ([facility] 09/15/2016    Psychophysiologic insomnia 09/14/2016    Cirrhosis (HCC)     Hep C w/o coma, chronic (HCC)     Fatty liver     Calculus of gallbladder without cholecystitis 08/10/2016    Chronic viral hepatitis B without delta agent and without coma ([facility] 07/22/2016    Hypomagnesemia     Alcohol withdrawal syndrome without complication (HCC) 27/23/1240    Cervical radicular pain 01/04/2016    Tubular adenoma of colon 01/01/2016    History of colon polyps 01/01/2016    Back pain, chronic 04/19/2012    Hearing difficulty 04/19/2012    GERD (gastroesophageal reflux disease) 04/19/2012           ANITA Acosta, APRN - CNP on 8/31/2021 at 6:14 AM

## 2021-08-31 NOTE — ANESTHESIA PRE PROCEDURE
Department of Anesthesiology  Preprocedure Note       Name:  Kulwant Ruggiero   Age:  57 y.o.  :  1960                                          MRN:  [id number]         Date:  2021      Surgeon: Ata Walker):  Tabatha Albarran MD    Procedure: Procedure(s):  EGD ESOPHAGOGASTRODUODENOSCOPY    Medications prior to admission:   Prior to Admission medications    Medication Sig Start Date End Date Taking?  Authorizing Provider   VITAMIN D, CHOLECALCIFEROL, PO Take by mouth Takes an OTC dose, unsure of dosage    Historical Provider, MD   traZODone (DESYREL) 50 MG tablet take 1 tablet by mouth nightly 21   Historical Provider, MD   FLUoxetine (PROZAC) 20 MG capsule Take 1 capsule by mouth daily 21   VASU Contreras   omeprazole (PRILOSEC) 40 MG delayed release capsule Take 1 capsule by mouth every morning (before breakfast) 21   Amber Gastelum MD   lidocaine-prilocaine (EMLA) 2.5-2.5 % cream Apply topically 45-60 mins prior to needle poke daily PRN 21   Amber Gastelum MD   prochlorperazine (COMPAZINE) 10 MG tablet Take 1 tablet by mouth every 6 hours as needed (nausea vomiting) 21   Amber Gastelum MD   ondansetron (ZOFRAN-ODT) 8 MG TBDP disintegrating tablet Place 1 tablet under the tongue 3 times daily as needed for Nausea or Vomiting 21   Amber Gastelum MD   D3-50 1.25 MG (73948 UT) CAPS once a week On    Historical Provider, MD   hydrOXYzine (VISTARIL) 25 MG capsule take 1 capsule by mouth three times a day if needed for anxiety 21   MASSIMO Pizano CNP   atorvastatin (LIPITOR) 20 MG tablet take 1 tablet by mouth at bedtime 21   MASSIMO Pizano CNP   spironolactone (ALDACTONE) 50 MG tablet take 1 tablet by mouth once daily 21   MASSIMO Pizano CNP   vitamin D (ERGOCALCIFEROL) 1.25 MG (29760 UT) CAPS capsule take 1 capsule by mouth every week 21   MASSIMO Beltran CNP       Current medications:    Current  Thrombocytopenia (HCC) D69.6    Hepatitis C virus infection resolved after antiviral drug therapy Z86.19    Gastrointestinal hemorrhage with melena K92.1    Alcohol abuse F10.10    Altered mental status R41.82    Hypocalcemia E83.51    Hypophosphatemia E83.39    Malignant neoplasm of lower third of esophagus (HCC) C15.5    COVID-19 U07.1    Anxiety F41.9    Current smoker F17.200       Past Medical History:        Diagnosis Date    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 Jazzmine Burr, orthopedic, every 3-4 months, gets steroid injection    Lezama esophagus     BPH (benign prostatic hypertrophy)     Cholelithiasis     Cirrhosis ([address]     COVID-19 12/2020    pt reports he had a positive test while at [facility] in 2020, was asymptomatic    COVID-19 vaccine series completed 5/20/2021, 6/22/2021    Moderna 5/20/2021, 6/22/2021    DDD (degenerative disc disease), lumbar     Depression     Esophageal cancer ([address]     INVASIVE ADENOCARCINOMA ARISING IN TUBULAR ADENOMA WITH HIGH GRADE DYSPLASIA, ASSOCIATED WITH FOCAL INTESTINAL METAPLASIA     Esophageal varices (HCC)     Fatty liver     GERD (gastroesophageal reflux disease)     Hep C w/o coma, chronic (HCC)     History of alcohol abuse     6-12 beers a day; quit drinking July 2016    History of blood transfusion     History of colon polyps 2016    History of tobacco abuse     [place] (hard of hearing)     Hyperlipidemia     Hypertension     Port-A-Cath in place     right upper chest    Stomach ulcer     hx of    Tubular adenoma of colon 2016, 2018    Vitamin D deficiency     Wears glasses        Past Surgical History:        Procedure Laterality Date    BUNIONECTOMY      twice on right side    BUNIONECTOMY Left     CARPAL TUNNEL RELEASE Right     COLONOSCOPY      at age 35    COLONOSCOPY  10/05/2016    polyps-pathology tubular adenoma, and abnormal looking mucosa right colon-pathology-tubular adenoma    COLONOSCOPY N/A 3/30/2018    COLONOSCOPY POLYPECTOMY COLD BIOPSY performed by Shelley Griffith MD at [address] [facility]  03/30/2018    Small polyp in the sigmoid colon and excised with biopsy forceps--tubular adenoma    ENDOSCOPY, COLON, DIAGNOSTIC      EGD    IR PORT PLACEMENT EQUAL OR GREATER THAN 5 YEARS  4/19/2021    IR PORT PLACEMENT EQUAL OR GREATER THAN 5 YEARS 4/19/2021 [facility]ED[facility]    KNEE SURGERY Left     cyst removed    NASAL SEPTUM SURGERY      NERVE BLOCK Right 11/23/2020    NERVE BLOCK RIGHT CERVICAL STEROID INJECTION  C3-C6 performed by Melecio Delaney MD at [address] [facility]  01/04/16    steroid injection C7 T1    OTHER SURGICAL HISTORY  11/21/2016    Bilateral Lumbar CACHORRO L4-L5 injections    OTHER SURGICAL HISTORY  12/19/2016    lumbar steroid injection    OTHER SURGICAL HISTORY  09/28/2018    BILATERAL L5 CACHORRO (N/A Back)    OTHER SURGICAL HISTORY Right 11/23/2020    cervical injection    PAIN MANAGEMENT PROCEDURE Left 7/9/2020    EPIDURAL STEROID INJECTION LEFT L4 L5 performed by Melecio Delaney MD at [address] [facility] Left 7/20/2020    LEFT L4 L5 EPIDURAL STEROID INJECTION performed by Melecio Delaney MD at [address] [place] [address] Bilateral 8/17/2020    LUMBAR FACET BILATERAL L2-L5 performed by Melecio Delaney MD at [address] [facility] Bilateral 12/7/2020    NERVE BLOCK BILATERAL LUMBAR MEDIAL BRANCH L2-L5 performed by Melecio Delaney MD at [address] AA&/STRD TFRML EPI LUMBAR/SACRAL 1 LEVEL Bilateral 9/6/2018    BILATERAL L5 CACHORRO performed by Melecio Delaney MD at [address] AA&/STRD TFRML EPI LUMBAR/SACRAL 1 LEVEL N/A 9/28/2018    BILATERAL L5 CACHORRO performed by Melecio Delaney MD at [address] N/CARPAL [address] Right 8/29/2017    CARPAL TUNNEL RELEASE RIGHT performed by Francis Sewell MD at [address] [facility] N/CARPAL TUNNEL SURG Left 10/31/2017    CARPAL TUNNEL RELEASE performed by Francis Sewell MD at [address] [facility] 2020    EGD BIOPSY performed by Ramakrishna Grey MD at [address] [facility] 2021    EGD BIOPSY and spot marking performed by Shelley Griffith MD at [address] [facility] N/A 2021    ENDOSCOPIC ULTRASOUND, EGD performed by Everardo Diez MD at [address] [facility] ENDOSCOPY  2021    EGD DIAGNOSTIC ONLY performed by Everardo Diez MD at [address] [facility] EXTRACTION         Social History:    Social History     Tobacco Use    Smoking status: Former Smoker     Packs/day: 1.00     Years: 45.00     Pack years: 45.00     Quit date: 2017     Years since quittin.6    Smokeless tobacco: Never Used   Substance Use Topics    Alcohol use: Not Currently     Comment: quit                                 Counseling given: Not Answered      Vital Signs (Current): There were no vitals filed for this visit.                                            BP Readings from Last 3 Encounters:   21 125/75   21 125/75   21 128/79       NPO Status:                                                                                 BMI:   Wt Readings from Last 3 Encounters:   21 204 lb (92.5 kg)   21 210 lb 3.2 oz (95.3 kg)   21 210 lb (95.3 kg)     There is no height or weight on file to calculate BMI.    CBC:   Lab Results   Component Value Date    WBC 5.9 2021    WBC 5.9 2021    RBC 4.29 2021    RBC 4.29 2021    RBC 4.75 2012    HGB 14.0 2021    HGB 14.0 2021    HCT 41.7 2021    HCT 41.7 2021    MCV 97.2 2021    MCV 97.2 2021    RDW 13.6 2021    RDW 13.6 2021     2021     2021     2012     LR    CMP:   Lab Results   Component Value Date    NA 137 08/19/2021     08/19/2021    K 4.4 08/19/2021    K 4.4 08/19/2021     08/19/2021     08/19/2021    CO2 26 08/19/2021    CO2 26 08/19/2021    BUN 5 08/19/2021    BUN 5 08/19/2021    CREATININE 0.53 08/19/2021    CREATININE 0.53 08/19/2021    GFRAA >60 08/19/2021    GFRAA >60 08/19/2021    LABGLOM >60 08/19/2021    LABGLOM >60 08/19/2021    GLUCOSE 98 08/19/2021    GLUCOSE 98 08/19/2021    GLUCOSE 65 04/19/2012    PROT 7.4 08/19/2021    PROT 7.4 08/19/2021    CALCIUM 9.7 08/19/2021    CALCIUM 9.7 08/19/2021    BILITOT 1.32 08/19/2021    BILITOT 1.32 08/19/2021    ALKPHOS 75 08/19/2021    ALKPHOS 75 08/19/2021    AST 39 08/19/2021    AST 39 08/19/2021    ALT 10 08/19/2021    ALT 10 08/19/2021       POC Tests: No results for input(s): POCGLU, POCNA, POCK, POCCL, POCBUN, POCHEMO, POCHCT in the last 72 hours.     Coags:   Lab Results   Component Value Date    PROTIME 11.6 08/19/2021    INR 1.1 08/19/2021    APTT 35.1 04/19/2021       HCG (If Applicable): No results found for: PREGTESTUR, PREGSERUM, HCG, HCGQUANT     ABGs: No results found for: PHART, PO2ART, FHR8NAD, UYA0BLD, BEART, K9IXYCLZ     Type & Screen (If Applicable):  No results found for: LABABO, LABRH    Drug/Infectious Status (If Applicable):  Lab Results   Component Value Date    HEPCAB REACTIVE 12/23/2020       COVID-19 Screening (If Applicable):   Lab Results   Component Value Date    COVID19 DETECTED 02/02/2021    COVID19 Not Detected 07/17/2020           Anesthesia Evaluation  Patient summary reviewed and Nursing notes reviewed no history of anesthetic complications:   Airway: Mallampati: II  TM distance: >3 FB   Neck ROM: full  Mouth opening: > = 3 FB Dental:          Pulmonary:Negative Pulmonary ROS and normal exam  breath sounds clear to auscultation      (-) pneumonia, COPD, asthma, shortness of breath, recent URI, sleep apnea, rhonchi, wheezes, rales, stridor, not a current smoker and no decreased breath sounds          Patient did not smoke on day of surgery. Cardiovascular:  Exercise tolerance: good (>4 METS),   (+) hypertension: no interval change,     (-) pacemaker, valvular problems/murmurs, past MI, CAD, CABG/stent, dysrhythmias,  angina,  CHF, orthopnea, PND,  FARIA, murmur, weak pulses,  friction rub, systolic click, carotid bruit,  JVD, peripheral edema, no pulmonary hypertension and no hyperlipidemia    ECG reviewed  Rhythm: regular  Rate: normal           Beta Blocker:  Not on Beta Blocker         Neuro/Psych:   (+) neuromuscular disease:, psychiatric history: stable with treatmentdepression/anxiety    (-) seizures, TIA, CVA and headaches           GI/Hepatic/Renal:   (+) GERD: no interval change, PUD, hepatitis: C, liver disease:,      (-) no renal disease, bowel prep and no morbid obesity       Endo/Other: Negative Endo/Other ROS   (+) no malignancy/cancer. (-) diabetes mellitus, hypothyroidism, hyperthyroidism, blood dyscrasia, arthritis, no electrolyte abnormalities, no malignancy/cancer               Abdominal:             Vascular: negative vascular ROS. - PVD, DVT and PE. Other Findings: Bad teeth          Anesthesia Plan      general     ASA 3       Induction: intravenous. MIPS: Postoperative opioids intended and Prophylactic antiemetics administered. Anesthetic plan and risks discussed with patient. Plan discussed with CRNA.                   Raz Browning MD   8/31/2021

## 2021-08-31 NOTE — OP NOTE
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 Patient received chemoradiation therapy. This was a flat lesion in the past.  Procedure performed to evaluate the lesion following chemoradiation therapy. POSTOPERATIVE DIAGNOSIS: Patient has radiation-induced esophagitis of the lower esophagus. Patient has very friable mucosa at the GE junction may be secondary to radiation therapy. Previous lesion site is very difficult to identify given that this was a thin plaque lesion. However, multiple biopsies taken at the approximate site of previous lesion. OPERATION: Upper GI endoscopy with Biopsy    ANESTHESIA: MAC    ESTIMATED BLOOD LOSS: None    COMPLICATIONS: None. SPECIMENS:  Was Obtained: At the previous lesion site to evaluate malignancy    HISTORY: The patient is a 57y.o. year old male with history of above preop diagnosis. I recommended esophagogastroduodenoscopy with possible biopsy and I explained the risk, benefits, expected outcome, and alternatives to the procedure. Risks included but are not limited to bleeding, infection, respiratory distress, hypotension, and perforation of the esophagus, stomach, or duodenum. Patient understands and is in agreement. PROCEDURE: The patient was given IV conscious sedation. The patient's SPO2 remained above 90% throughout the procedure. Cetacaine spray given. Patient placed in left lateral position. Olympus  videogastroscope was inserted orally under vision into the esophagus without difficulty and advanced into the stomach then through the pylorus up to the second part of duodenum. Findings:    Retropharyngeal area was grossly normal appearing    Esophagus: abnormal: In the lower esophagus patient had radiation-induced esophagitis and a friable mucosa.   No obvious lesion seen at the probable previous lesion site. Mucosae in this area is very friable may be secondary to radiation therapy. At the approximate site, multiple biopsies taken evaluate possibility of malignancy. A small sliding hiatal hernia. No obvious varices seen. Stomach:    Fundus and Cardia Examined in Retroflexed View: abnormal: May have probable radiation-induced mucosal changes in the fundus and upper part of the stomach. Also it is possible that he may have portal hypertensive gastropathy. Body: normal    Antrum: normal    Duodenum:     Descending: normal    Bulb: normal    While withdrawing the scope the above findings were verified and the scope was removed. The patient has tolerated the procedure without unusual events. Recommendations/Plan:   1. F/U Biopsies  2. F/U In Office as instructed  3.  Discussed with the family                   Electronically signed by Ata Lopez MD  on 8/31/2021 at 9:12 AM

## 2021-09-03 LAB — SURGICAL PATHOLOGY REPORT: NORMAL

## 2021-09-20 ENCOUNTER — OFFICE VISIT (OUTPATIENT)
Dept: GASTROENTEROLOGY | Age: 61
End: 2021-09-20
Payer: MEDICARE

## 2021-09-20 VITALS
SYSTOLIC BLOOD PRESSURE: 155 MMHG | OXYGEN SATURATION: 94 % | DIASTOLIC BLOOD PRESSURE: 92 MMHG | TEMPERATURE: 98.4 F | BODY MASS INDEX: 29.86 KG/M2 | HEIGHT: 70 IN | HEART RATE: 91 BPM | WEIGHT: 208.6 LBS

## 2021-09-20 DIAGNOSIS — K92.1 MELENA: Primary | ICD-10-CM

## 2021-09-20 PROCEDURE — 99214 OFFICE O/P EST MOD 30 MIN: CPT | Performed by: INTERNAL MEDICINE

## 2021-09-20 ASSESSMENT — ENCOUNTER SYMPTOMS
TROUBLE SWALLOWING: 0
SORE THROAT: 0
RESPIRATORY NEGATIVE: 1
BACK PAIN: 0
ABDOMINAL PAIN: 1
SHORTNESS OF BREATH: 0
CONSTIPATION: 1
COUGH: 0
VOMITING: 1
ALLERGIC/IMMUNOLOGIC NEGATIVE: 1
ANAL BLEEDING: 1
ABDOMINAL DISTENTION: 1
CHOKING: 0
RECTAL PAIN: 0
NAUSEA: 0
BLOOD IN STOOL: 1
VOICE CHANGE: 0
DIARRHEA: 1

## 2021-09-20 NOTE — PROGRESS NOTES
"Acute Care - Physical Therapy Discharge Summary   Martha Mauricio       Patient Name: Esdras Ko  : 1950  MRN: [id number]    Today's Date: 2021                 Admit Date: 2021    Patient ambulating independently in room upon arrival without use of device.  Patient declines further therapy needs, stating \"I have been getting up fine, just like I do at home.\"  Patient reports no concerns regarding return home with spouse support at this time.  Education provided on potential use of rolling walker, however patient declines.  Will discharge from PT services.    Anu Pierre, PT   2021       " GI OFFICE FOLLOW UP    INTERVAL HISTORY:   No referring provider defined for this encounter. Chief Complaint   Patient presents with    Follow-up     Patient is here today to f/u on 8/31/21 EGD       No diagnosis found. HISTORY OF PRESENT ILLNESS: Hali Holland is a 57 y.o. male with a past history remarkable for ,   Patient seen for follow-up of malignancy at the GE junction. Basing on the EUS, this lesion was T2 N0 MX. Following that patient was evaluated at [address] [place] [address],[address], and had chemoradiation therapy. Repeat EGD recently did not reveal obvious lesions. At the previous lesion site, patient has friable mucosa may be secondary to radiation esophagitis, Lezama's esophagitis. Extensive biopsies from the site revealed dysplasia. No clear-cut invasive malignancy noted. Patient continues to drink alcohol. Is known to have alcoholic liver disease/cirrhosis. Last alcoholic drink was 2 weeks ago. Known to have ascites. Patient was evaluated at [address] [place] [address],[address] and it was felt that patient may not be a good surgical candidate for esophagectomy. Also imaging studies did reveal cholelithiasis and the patient denies symptoms related to gallstones. Past Medical,Family, and Social History reviewed and does contribute to the patient presenting condition. Patient's PMH/PSH,SH,PSYCH Hx, MEDs, ALLERGIES, and ROS were all reviewed and updated in the appropriate sections.  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 Mohsen Voss, orthopedic, every 3-4 months, gets steroid injection    Lezama esophagus     BPH (benign prostatic hypertrophy)     Cholelithiasis     [facility]     COVID-19 12/2020    pt reports he had a positive test while at [facility] in 2020, was asymptomatic    COVID-19 vaccine series completed 5/20/2021, 6/22/2021    Moderna 5/20/2021, 6/22/2021    DDD (degenerative disc disease), lumbar     Depression     Esophageal cancer ([facility]     INVASIVE ADENOCARCINOMA ARISING IN TUBULAR ADENOMA WITH HIGH GRADE DYSPLASIA, ASSOCIATED WITH FOCAL INTESTINAL METAPLASIA     Esophageal varices ([facility]     Fatty liver     GERD (gastroesophageal reflux disease)     Hep C w/o coma, chronic ([facility]     History of alcohol abuse     6-12 beers a day; quit drinking July 2016    History of blood transfusion     History of colon polyps 2016    History of tobacco abuse     [place] (hard of hearing)     Hyperlipidemia     Hypertension     Port-A-Cath in place     right upper chest    Stomach ulcer     hx of    Tubular adenoma of colon 2016, 2018    Vitamin D deficiency     Wears glasses        Past Surgical History:   Procedure Laterality Date    BUNIONECTOMY      twice on right side    BUNIONECTOMY Left     CARPAL TUNNEL RELEASE Right     COLONOSCOPY      at age 35    COLONOSCOPY  10/05/2016    polyps-pathology tubular adenoma, and abnormal looking mucosa right colon-pathology-tubular adenoma    COLONOSCOPY N/A 3/30/2018    COLONOSCOPY POLYPECTOMY COLD BIOPSY performed by Ata Lopez MD at [facility]  03/30/2018    Small polyp in the sigmoid colon and excised with biopsy forceps--tubular adenoma    ENDOSCOPY, COLON, DIAGNOSTIC      EGD    IR PORT PLACEMENT EQUAL OR GREATER THAN 5 YEARS  4/19/2021    IR PORT PLACEMENT EQUAL OR GREATER THAN 5 YEARS 4/19/2021 [facility] PROCED[facility]    KNEE SURGERY Left     cyst removed    NASAL SEPTUM SURGERY      NERVE BLOCK Right 11/23/2020    NERVE BLOCK RIGHT CERVICAL STEROID INJECTION  C3-C6 performed by Ximena Walton MD at [facility]  01/04/16    steroid injection C7 T1    OTHER SURGICAL HISTORY  11/21/2016    Bilateral Lumbar CACHORRO L4-L5 injections    OTHER SURGICAL HISTORY  12/19/2016    lumbar steroid injection    OTHER SURGICAL HISTORY  09/28/2018    BILATERAL L5 CACHORRO (N/A Back)    OTHER SURGICAL HISTORY Right 11/23/2020    cervical injection    PAIN MANAGEMENT PROCEDURE Left 7/9/2020    EPIDURAL STEROID INJECTION LEFT L4 L5 performed by Rigoberto Alexandre MD at [facility] Left 7/20/2020    LEFT L4 L5 EPIDURAL STEROID INJECTION performed by Rigoberto Alexandre MD at [facility] Bilateral 8/17/2020    LUMBAR FACET BILATERAL L2-L5 performed by Rigoberto Alexandre MD at [facility] Bilateral 12/7/2020    NERVE BLOCK BILATERAL LUMBAR MEDIAL BRANCH L2-L5 performed by Rigoberto Alexandre MD at [address] AA&/STRD TFRML EPI LUMBAR/SACRAL 1 LEVEL Bilateral 9/6/2018    BILATERAL L5 CACHORRO performed by Rigoberto Alexandre MD at [address] AA&/STRD TFRML EPI LUMBAR/SACRAL 1 LEVEL N/A 9/28/2018    BILATERAL L5 CACHORRO performed by Rigoberto Alexandre MD at [facility] N/CARPAL TUNNEL SURG Right 8/29/2017    CARPAL TUNNEL RELEASE RIGHT performed by Garrett Hudson MD at [address] [place] [address] N/CARPAL TUNNEL SURG Left 10/31/2017    CARPAL TUNNEL RELEASE performed by Garrett Hudson MD at [address] [place] [address] 12/29/2020    EGD BIOPSY performed by Gen Vallejo MD at [place] [address] N/A 2/2/2021    EGD BIOPSY and spot marking performed by Hema Jones MD at [place] [address] N/A 2/12/2021    ENDOSCOPIC ULTRASOUND, EGD performed by Andrea Daniel MD at [address] [place] [address]  2/12/2021    EGD DIAGNOSTIC ONLY performed by Andrea Daniel MD at [address] [place] [address] 8/31/2021    EGD BIOPSY performed by Hema Jones MD at [facility]:    Current Outpatient Medications:     VITAMIN D, CHOLECALCIFEROL, PO, Take by mouth Takes an OTC dose, unsure of dosage, Disp: , Rfl:     FLUoxetine (PROZAC) 20 MG capsule, Take 1 capsule by mouth daily, Disp: 30 capsule, Rfl: 3    omeprazole (PRILOSEC) 40 MG delayed release capsule, Take 1 capsule by mouth every morning (before breakfast), Disp: 30 capsule, Rfl: 2    lidocaine-prilocaine (EMLA) 2.5-2.5 % cream, Apply topically 45-60 mins prior to needle poke daily PRN, Disp: 1 Tube, Rfl: 2    ondansetron (ZOFRAN-ODT) 8 MG TBDP disintegrating tablet, Place 1 tablet under the tongue 3 times daily as needed for Nausea or Vomiting, Disp: 90 tablet, Rfl: 2    D3-50 1.25 MG (09096 UT) CAPS, once a week On , Disp: , Rfl:     hydrOXYzine (VISTARIL) 25 MG capsule, take 1 capsule by mouth three times a day if needed for anxiety, Disp: 90 capsule, Rfl: 2    atorvastatin (LIPITOR) 20 MG tablet, take 1 tablet by mouth at bedtime, Disp: 90 tablet, Rfl: 1    spironolactone (ALDACTONE) 50 MG tablet, take 1 tablet by mouth once daily, Disp: 90 tablet, Rfl: 3    vitamin D (ERGOCALCIFEROL) 1.25 MG (68435 UT) CAPS capsule, take 1 capsule by mouth every week, Disp: 12 capsule, Rfl: 1    ALLERGIES:   Allergies   Allergen Reactions    Pollen Extract      Runny nose, watery eyes       FAMILY HISTORY:       Problem Relation Age of Onset    Cancer Mother         pancreatic    Cancer Father         bone    Diabetes Sister     Asthma Brother          SOCIAL HISTORY:   Social History     Socioeconomic History    Marital status: Single     Spouse name: Not on file    Number of children: Not on file    Years of education: Not on file    Highest education level: Not on file   Occupational History    Not on file   Tobacco Use    Smoking status: Former Smoker     Packs/day: 1.00     Years: 45.00     Pack years: 45.00     Quit date: 2017     Years since quittin.6    Smokeless tobacco: Never Used   Vaping Use    Vaping Use: Never used   Substance and Sexual Activity    Alcohol use: Not Currently     Comment: quit 2019    Drug use: Not Currently     Frequency: 1.0 times per week     Comment: cocaine,  stopped spring 2016    Sexual activity: Yes     Partners: Female   Other Topics Concern    Not on file   Social History Narrative     in the past, retired     Social Determinants of Health     Financial Resource Strain: [address] Difficulty of Paying Living Expenses: Not hard at all   Food Insecurity: No Food Insecurity    Worried About [address] in the Last Year: Never true   [address] in the Last Year: Never true   Transportation Needs: No Transportation Needs    Lack of Transportation (Medical): No    Lack of Transportation (Non-Medical): No   Physical Activity:     Days of Exercise per Week:     Minutes of Exercise per Session:    Stress:     Feeling of Stress :    Social Connections:     Frequency of Communication with Friends and Family:     Frequency of Social Gatherings with Friends and Family:     Attends [employer] Services:     Active Member of Clubs or Organizations:     Attends Club or Organization Meetings:     Marital Status:    Intimate Partner Violence:     Fear of Current or Ex-Partner:     Emotionally Abused:     Physically Abused:     Sexually Abused:          REVIEW OF SYSTEMS:         Review of Systems   Constitutional: Positive for fatigue. Negative for appetite change and unexpected weight change. HENT: Positive for hearing loss (rt ear). Negative for sore throat, trouble swallowing and voice change. Eyes: Positive for visual disturbance (wears glasses). Respiratory: Negative. Negative for cough, choking and shortness of breath. Cardiovascular: Negative. Negative for chest pain and leg swelling. Gastrointestinal: Positive for abdominal distention, abdominal pain, anal bleeding, blood in stool, constipation, diarrhea and vomiting.  Negative for nausea and rectal pain. Denies   Endocrine: Negative. Negative for polydipsia, polyphagia and polyuria. Genitourinary: Negative for difficulty urinating, frequency, hematuria and urgency. Musculoskeletal: Negative for back pain, joint swelling and myalgias. Skin: Negative. Allergic/Immunologic: Negative. Negative for environmental allergies, food allergies and immunocompromised state. Neurological: Negative for dizziness, tremors, weakness, light-headedness, numbness and headaches. Hematological: Bruises/bleeds easily. Psychiatric/Behavioral: Positive for sleep disturbance. The patient is nervous/anxious. PHYSICAL EXAMINATION:     Vital signs reviewed per the nursing documentation. Temp 98.4 °F (36.9 °C)   Ht 5' 10\" (1.778 m)   Wt 208 lb 9.6 oz (94.6 kg)   BMI 29.93 kg/m²   Body mass index is 29.93 kg/m². Physical Exam  Vitals and nursing note reviewed. Constitutional:       General: He is not in acute distress. Appearance: He is well-developed. HENT:      Head: Normocephalic and atraumatic. Mouth/Throat:      Pharynx: No oropharyngeal exudate. Eyes:      General: No scleral icterus. Conjunctiva/sclera: Conjunctivae normal.      Pupils: Pupils are equal, round, and reactive to light. Neck:      Thyroid: No thyromegaly. Trachea: No tracheal deviation. Cardiovascular:      Rate and Rhythm: Normal rate and regular rhythm. Heart sounds: Normal heart sounds. No murmur heard. Pulmonary:      Effort: Pulmonary effort is normal. No respiratory distress. Breath sounds: Normal breath sounds. No wheezing or rales. Abdominal:      General: Bowel sounds are normal. There is no distension. Palpations: Abdomen is soft. There is no hepatomegaly or mass. Tenderness: There is no abdominal tenderness. There is no guarding. Hernia: No hernia is present. Comments: No peripheral signs of ch. Liver disease. Has mild ascites.    Genitourinary: Rectum: Normal.   Musculoskeletal:      Cervical back: Normal range of motion and neck supple. Right lower leg: No edema. Left lower leg: No edema. Lymphadenopathy:      Cervical: No cervical adenopathy. Skin:     General: Skin is warm and dry. Findings: No erythema or rash. Neurological:      Mental Status: He is alert and oriented to person, place, and time. Cranial Nerves: No cranial nerve deficit. Psychiatric:         Thought Content:  Thought content normal.           LABORATORY DATA: Reviewed  Lab Results   Component Value Date    WBC 5.9 08/19/2021    WBC 5.9 08/19/2021    HGB 14.0 08/19/2021    HGB 14.0 08/19/2021    HCT 41.7 08/19/2021    HCT 41.7 08/19/2021    MCV 97.2 08/19/2021    MCV 97.2 08/19/2021     (L) 08/19/2021     (L) 08/19/2021     08/19/2021     08/19/2021    K 4.4 08/19/2021    K 4.4 08/19/2021     08/19/2021     08/19/2021    CO2 26 08/19/2021    CO2 26 08/19/2021    BUN 5 (L) 08/19/2021    BUN 5 (L) 08/19/2021    CREATININE 0.53 (L) 08/19/2021    CREATININE 0.53 (L) 08/19/2021    LABPROT 7.7 04/19/2012    LABALBU 3.4 (L) 08/19/2021    LABALBU 3.4 (L) 08/19/2021    BILITOT 1.32 (H) 08/19/2021    BILITOT 1.32 (H) 08/19/2021    ALKPHOS 75 08/19/2021    ALKPHOS 75 08/19/2021    AST 39 08/19/2021    AST 39 08/19/2021    ALT 10 08/19/2021    ALT 10 08/19/2021    INR 1.1 08/19/2021         Lab Results   Component Value Date    RBC 4.29 (L) 08/19/2021    RBC 4.29 (L) 08/19/2021    HGB 14.0 08/19/2021    HGB 14.0 08/19/2021    MCV 97.2 08/19/2021    MCV 97.2 08/19/2021    MCH 32.5 08/19/2021    MCH 32.5 08/19/2021    MCHC 33.5 08/19/2021    MCHC 33.5 08/19/2021    RDW 13.6 08/19/2021    RDW 13.6 08/19/2021    MPV 8.1 08/19/2021    MPV 8.1 08/19/2021    BASOPCT 0 08/19/2021    BASOPCT 0 08/19/2021    LYMPHSABS 0.70 (L) 08/19/2021    LYMPHSABS 0.70 (L) 08/19/2021    MONOSABS 0.60 08/19/2021    MONOSABS 0.60 08/19/2021    NEUTROABS 4.50 08/19/2021    NEUTROABS 4.50 08/19/2021    EOSABS 0.10 08/19/2021    EOSABS 0.10 08/19/2021    BASOSABS 0.00 08/19/2021    BASOSABS 0.00 08/19/2021         DIAGNOSTIC TESTING:     FLUORO FOR SURGICAL PROCEDURES    Result Date: 8/23/2021  Radiology result is complete; follow up with provider / physician office for radiology results          Assessment  No diagnosis found. Plan:    Discussed with the patient and the family regarding EGD findings and histology results. 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 May need RFA. Given that he has hiatal hernia, the efficacy of RFA is in question. However this can be attempted at [facility]. Also patient is going to see oncology service tomorrow and according to the patient, he is going to have PET CT scan. I will discuss with oncology service regarding further management. Strongly encouraged abstinence from alcohol. To stay on 2 g sodium diet. If he develops melanotic stools, edema of the lower extremities, drowsiness, or worsening of the ascites to contact me. After discussion with oncology will decide further management of Lezama's mucosa with dysplasia. Thank you for allowing me to participate in the care of Mr. Cristhian Young. For any further questions please do not hesitate to contact me. I have reviewed and agree with the ROS entered by the MA/LPN. Note is dictated utilizing voice recognition software. Unfortunately this leads to occasional typographical errors.  Please contact our office if you have any questions        Lindsay Jorge MD,FACP, [facility]  Board Certified in Gastroenterology and [address] [place] [address] Gastroenterology  Office #: [phone]

## 2021-09-21 ENCOUNTER — OFFICE VISIT (OUTPATIENT)
Dept: ONCOLOGY | Age: 61
End: 2021-09-21
Payer: MEDICARE

## 2021-09-21 ENCOUNTER — TELEPHONE (OUTPATIENT)
Dept: ONCOLOGY | Age: 61
End: 2021-09-21

## 2021-09-21 ENCOUNTER — HOSPITAL ENCOUNTER (OUTPATIENT)
Age: 61
Discharge: HOME | End: 2021-09-21
Payer: MEDICARE

## 2021-09-21 VITALS
TEMPERATURE: 98 F | HEART RATE: 102 BPM | WEIGHT: 210.8 LBS | DIASTOLIC BLOOD PRESSURE: 84 MMHG | RESPIRATION RATE: 20 BRPM | SYSTOLIC BLOOD PRESSURE: 128 MMHG | BODY MASS INDEX: 30.25 KG/M2

## 2021-09-21 DIAGNOSIS — K92.1 MELENA: ICD-10-CM

## 2021-09-21 DIAGNOSIS — C15.5 MALIGNANT NEOPLASM OF LOWER THIRD OF ESOPHAGUS (HCC): Primary | ICD-10-CM

## 2021-09-21 LAB
HCT VFR BLD CALC: 37.5 % (ref 41–53)
HEMOGLOBIN: 12.4 G/DL (ref 13.5–17.5)
MCH RBC QN AUTO: 31.5 PG (ref 26–34)
MCHC RBC AUTO-ENTMCNC: 33.2 G/DL (ref 31–37)
MCV RBC AUTO: 95 FL (ref 80–100)
NRBC AUTOMATED: ABNORMAL PER 100 WBC
PDW BLD-RTO: 14.1 % (ref 11.5–14.9)
PLATELET # BLD: 105 K/UL (ref 150–450)
PMV BLD AUTO: 7.7 FL (ref 6–12)
RBC # BLD: 3.95 M/UL (ref 4.5–5.9)
WBC # BLD: 4.8 K/UL (ref 3.5–11)

## 2021-09-21 PROCEDURE — 36415 COLL VENOUS BLD VENIPUNCTURE: CPT

## 2021-09-21 PROCEDURE — 99214 OFFICE O/P EST MOD 30 MIN: CPT | Performed by: INTERNAL MEDICINE

## 2021-09-21 PROCEDURE — 85027 COMPLETE CBC AUTOMATED: CPT

## 2021-09-21 NOTE — PROGRESS NOTES
Patient ID: Zahra Akers, [birth year], [id number], 57 y.o. Referred by : Dr Enrico Van  Reason for consultation:   Esophageal cancer T2N0Mx  Stage II   started on concurrent chemoradiation preoperatively on 5/4/21  Chemoradiation completed 6/9/21  Patient had a PET scan on 7/23/2021 which showed no evidence of recurrence  Patient has been evaluated by thoracic surgeon at [facility] Dr. Cassandra Prado and also hepatologist Dr. Weston Gruber his case was discussed at thoracic tumor oncology board with recommendations NOT to do any surgery because of his liver failure. So surveillance recommended  HISTORY OF PRESENT ILLNESS:    Oncologic History:  Zahra Akers is a 57 y.o. male with a history of hepatitis C, status post treatment, liver cirrhosis, history of alcohol abuse was seen during initial consultation visit for newly diagnosed esophageal cancer. Patient reportedly had \"heavy drinking alcohol in about 2016 however recently in December he had 2 beers and he presented with hematemesis. On 12/29/2020 he had upper endoscopy which showed severe portal hypertension, gastropathy. The biopsy from the GE junction showed invasive adenocarcinoma. Patient had a follow-up EGD on 2/2/2021 which confirmed esophageal adenocarcinoma. Patient had endoscopic ultrasound on 2/12/2021 which showed T2 N0 MX disease with underlying esophageal varices. In the esophagus hypoechoic lesion noted in the lower esophagus penetrating into submucosa and into muscularis propria. No lymphadenopathy noted. Patient was referred to medical oncology for further recommendations. He denies any difficulty swallowing. He does not smoke he has quit smoking in 2016. He has not drank alcohol since December. He has a history of hepatitis C and he has received treatment. He lives by himself. He is accompanied by his ex-wife during today's office visit.     INTERVAL HISTORY:   Patient is returning for follow-up visit and to discuss further recommendations. He was evaluated by thoracic surgery at [facility]. As per the tumor board recommendations from there surgery was deemed very high risk therefore surveillance only recommended. He had an upper endoscopy on 8/31 which showed no residual cancer but showed Lezama's esophagus with high-grade dysplasia. He denies any swallowing difficulty. He denies any pain, abdominal pain nausea vomiting. During this visit patient's allergy, social, medical, surgical history and medications were reviewed and updated.     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 Leidy Burr, orthopedic, every 3-4 months, gets steroid injection    Lezama esophagus     BPH (benign prostatic hypertrophy)     Cholelithiasis     Cirrhosis ([address]     COVID-19 12/2020    pt reports he had a positive test while at [facility] in 2020, was asymptomatic    COVID-19 vaccine series completed 5/20/2021, 6/22/2021    Moderna 5/20/2021, 6/22/2021    DDD (degenerative disc disease), lumbar     Depression     Esophageal cancer ([address]     INVASIVE ADENOCARCINOMA ARISING IN TUBULAR ADENOMA WITH HIGH GRADE DYSPLASIA, ASSOCIATED WITH FOCAL INTESTINAL METAPLASIA     Esophageal varices ([address]     Fatty liver     GERD (gastroesophageal reflux disease)     Hep C w/o coma, chronic ([address]     History of alcohol abuse     6-12 beers a day; quit drinking July 2016    History of blood transfusion     History of colon polyps 2016    History of tobacco abuse     [place] (hard of hearing)     Hyperlipidemia     Hypertension     Port-A-Cath in place     right upper chest    Stomach ulcer     hx of    Tubular adenoma of colon 2016, 2018    Vitamin D deficiency     Wears glasses        Past Surgical History:   Procedure Laterality Date    BUNIONECTOMY      twice on right side    BUNIONECTOMY Left     CARPAL TUNNEL RELEASE Right  COLONOSCOPY      at age 35    COLONOSCOPY  10/05/2016    polyps-pathology tubular adenoma, and abnormal looking mucosa right colon-pathology-tubular adenoma    COLONOSCOPY N/A 3/30/2018    COLONOSCOPY POLYPECTOMY COLD BIOPSY performed by Alistair Dean MD at [address] [place] [address]  03/30/2018    Small polyp in the sigmoid colon and excised with biopsy forceps--tubular adenoma    ENDOSCOPY, COLON, DIAGNOSTIC      EGD    IR PORT PLACEMENT EQUAL OR GREATER THAN 5 YEARS  4/19/2021    IR PORT PLACEMENT EQUAL OR GREATER THAN 5 YEARS 4/19/2021 [facility] SPECIAL PROCEDURES    KNEE SURGERY Left     cyst removed    NASAL SEPTUM SURGERY      NERVE BLOCK Right 11/23/2020    NERVE BLOCK RIGHT CERVICAL STEROID INJECTION  C3-C6 performed by Satish Smallwood MD at [facility]  01/04/16    steroid injection C7 T1    OTHER SURGICAL HISTORY  11/21/2016    Bilateral Lumbar CACHORRO L4-L5 injections    OTHER SURGICAL HISTORY  12/19/2016    lumbar steroid injection    OTHER SURGICAL HISTORY  09/28/2018    BILATERAL L5 CACHORRO (N/A Back)    OTHER SURGICAL HISTORY Right 11/23/2020    cervical injection    PAIN MANAGEMENT PROCEDURE Left 7/9/2020    EPIDURAL STEROID INJECTION LEFT L4 L5 performed by Satish Smallwood MD at [address] [place] [address] Left 7/20/2020    LEFT L4 L5 EPIDURAL STEROID INJECTION performed by Satish Smallwood MD at [address] [place] [address] Bilateral 8/17/2020    LUMBAR FACET BILATERAL L2-L5 performed by Satish Smallwood MD at [address] [place] [address] Bilateral 12/7/2020    NERVE BLOCK BILATERAL LUMBAR MEDIAL BRANCH L2-L5 performed by Satish Smallwood MD at [address] AA&/STRD TFRML EPI LUMBAR/SACRAL 1 LEVEL Bilateral 9/6/2018    BILATERAL L5 CACHORRO performed by Satish Smallwood MD at [address] AA&/STRD TFRML EPI LUMBAR/SACRAL 1 LEVEL N/A 9/28/2018    BILATERAL L5 CACHORRO performed by Satish Smallwood MD at [facility] SURG Right 8/29/2017    CARPAL TUNNEL RELEASE RIGHT performed by Esdras Sanford MD at [address] N/CARPAL TUNNEL SURG Left 10/31/2017    CARPAL TUNNEL RELEASE performed by Esdras Sanford MD at [facility] N/A 12/29/2020    EGD BIOPSY performed by Marichuy Ramos MD at [facility] N/A 2/2/2021    EGD BIOPSY and spot marking performed by Jose Juan Chris MD at [facility] 2/12/2021    ENDOSCOPIC ULTRASOUND, EGD performed by Mayco Gan MD at [address] [place] [address]  2/12/2021    EGD DIAGNOSTIC ONLY performed by Mayco Gan MD at [address] [place] [address] N/A 8/31/2021    EGD BIOPSY performed by Jose Juan Chris MD at [facility]   Allergen Reactions    Pollen Extract      Runny nose, watery eyes       Current Outpatient Medications   Medication Sig Dispense Refill    VITAMIN D, CHOLECALCIFEROL, PO Take by mouth Takes an OTC dose, unsure of dosage      FLUoxetine (PROZAC) 20 MG capsule Take 1 capsule by mouth daily 30 capsule 3    omeprazole (PRILOSEC) 40 MG delayed release capsule Take 1 capsule by mouth every morning (before breakfast) 30 capsule 2    lidocaine-prilocaine (EMLA) 2.5-2.5 % cream Apply topically 45-60 mins prior to needle poke daily PRN 1 Tube 2    ondansetron (ZOFRAN-ODT) 8 MG TBDP disintegrating tablet Place 1 tablet under the tongue 3 times daily as needed for Nausea or Vomiting 90 tablet 2    D3-50 1.25 MG (41351 UT) CAPS once a week On Tuesdays      hydrOXYzine (VISTARIL) 25 MG capsule take 1 capsule by mouth three times a day if needed for anxiety 90 capsule 2    atorvastatin (LIPITOR) 20 MG tablet take 1 tablet by mouth at bedtime 90 tablet 1    spironolactone (ALDACTONE) 50 MG tablet take 1 tablet by mouth once daily 90 tablet 3    vitamin D (ERGOCALCIFEROL) 1.25 MG (09211 UT) CAPS capsule take 1 capsule by mouth every week 12 capsule 1     No current facility-administered medications for this visit. Social History     Socioeconomic History    Marital status: Single     Spouse name: Not on file    Number of children: Not on file    Years of education: Not on file    Highest education level: Not on file   Occupational History    Not on file   Tobacco Use    Smoking status: Former Smoker     Packs/day: 1.00     Years: 45.00     Pack years: 45.00     Quit date: 2017     Years since quittin.6    Smokeless tobacco: Never Used   Vaping Use    Vaping Use: Never used   Substance and Sexual Activity    Alcohol use: Not Currently     Comment: quit     Drug use: Not Currently     Frequency: 1.0 times per week     Comment: cocaine,  stopped spring 2016    Sexual activity: Yes     Partners: Female   Other Topics Concern    Not on file   Social History Narrative     in the past, retired     Social Determinants of Health     Financial Resource Strain: [address] Difficulty of Paying Living Expenses: Not hard at all   Food Insecurity: No Food Insecurity    Worried About [address] in the Last Year: Never true   [address] [facility] in the Last Year: Never true   Transportation Needs: No Transportation Needs    Lack of Transportation (Medical): No    Lack of Transportation (Non-Medical):  No   Physical Activity:     Days of Exercise per Week:     Minutes of Exercise per Session:    Stress:     Feeling of Stress :    Social Connections:     Frequency of Communication with Friends and Family:     Frequency of Social Gatherings with Friends and Family:     Attends Sikh Services:     Active Member of Clubs or Organizations:     Attends Club or Organization Meetings:     Marital Status:    Intimate Partner Violence:     Fear of Current or Ex-Partner:     Emotionally Abused:     Physically Abused:     Sexually Abused:        Family History   Problem Relation Age of Onset    Cancer Mother         pancreatic    Cancer Father         bone    Diabetes Sister     Asthma Brother         REVIEW OF SYSTEM:     Constitutional: No fever or chills. No night sweats, no weight loss   Eyes: No eye discharge, double vision, or eye pain   HEENT: negative for sore mouth, sore throat, hoarseness and voice change   Respiratory: negative for cough , sputum, dyspnea, wheezing, hemoptysis, chest pain   Cardiovascular: negative for chest pain, dyspnea, palpitations, orthopnea, PND   Gastrointestinal: negative for nausea, vomiting, diarrhea, constipation, abdominal pain, Dysphagia, hematemesis and hematochezia   Genitourinary: negative for frequency, dysuria, nocturia, urinary incontinence, and hematuria   Integument: negative for rash, skin lesions, bruises.    Hematologic/Lymphatic: negative for easy bruising, bleeding, lymphadenopathy, petechiae and swelling/edema   Endocrine: negative for heat or cold intolerance, tremor, weight changes, change in bowel habits and hair loss   Musculoskeletal: negative for myalgias, arthralgias, pain, joint swelling,and bone pain   Neurological: negative for headaches, dizziness, seizures, weakness, numbness       OBJECTIVE:         Vitals:    09/21/21 1434   BP: 128/84   Pulse: 102   Resp: 20   Temp: 98 °F (36.7 °C)       PHYSICAL EXAM:   General appearance - well appearing, no in pain or distress   Mental status - alert and cooperative   Eyes - pupils equal and reactive, extraocular eye movements intact   Ears - bilateral TM's and external ear canals normal   Mouth - mucous membranes moist, pharynx normal without lesions   Neck - supple, no significant adenopathy   Lymphatics - no palpable lymphadenopathy, no hepatosplenomegaly   Chest - clear to auscultation, no wheezes, rales or rhonchi, symmetric air entry   Heart - normal rate, regular rhythm, normal S1, S2, no murmurs, rubs, clicks or Lab status: Final   Resulting lab: [address] LAB   Value: [id number]   [address]   [address] [facility] [place], [address] [place] [address]   [phone]   Fax: [phone]   SURGICAL PATHOLOGY REPORT     Patient Name: Genevieve Odom   MR#: [id number]   Specimen #[id number]         Final Diagnosis   GASTROESOPHAGEAL JUNCTION, BIOPSY:          INVASIVE ADENOCARCINOMA    Final Diagnosis   ESOPHAGUS, LESION AT 34 CM, BIOPSY:          INVASIVE ADENOCARCINOMA ARISING IN TUBULAR ADENOMA WITH HIGH   GRADE DYSPLASIA, ASSOCIATED WITH FOCAL INTESTINAL METAPLASIA (SEE   COMMENT)     Diagnosis Comment   The malignancy diagnosis is confirmed by review of a second   pathologist. Shine Walker result [facility] HER[facility] IHC [facility]   gastroesophageal adenocarcinoma will be issued in an addendum.    ADDENDUM (SCM)     Date Ordered:     2/16/2021     Status: Signed Out        Date Complete:     2/16/2021     By: Daija Sanders M.D.        Date Reported:     2/16/2021       ADDENDUM COMMENT   This addendum is issued in order to incorporate the result of HER2 by   [address] [place] [address], performed at [address] [place], [place] [address] ([id number], 02/16/2021).       \"RESULTS:  INDETERMINATE     Interpretation:     Average HER2 signals/nucleus:  4.4   Average SUNG 17 signals/nucleus:  3.4   HER2/SUNG 17 signal ratio:  1.3   Number of Observers:  2     Even after analysis of additional cells and review of slides by a   pathologist, FISH results show a HER2/SUNG 17 ratio < 2.0 and an   average of 4-6 HER2 signals per nucleus and 3 or more SUNG 17 signals   per nucleus.  The results are INDETERMINATE.       In this situation, the 2016 CAP/ASCP/ASCO guidelines recommend   selecting a different tumor block for HER2 testing, if available. \"       Of note, there is only one block available for testing of invasive   esophageal adenocarcinoma tumor cells.  It was already used for HER2   IHC and HER2 FISH testing with the results issued in the addendums.     IMAGING DATA:    Reviewed  ASSESSMENT:    Dorcas Ortiz is a 57 y.o. male with history of hepatitis C, liver cirrhosis, history of alcohol abuse, with esophageal cancer. I reviewed the NCCN guidelines and goals of care. Clinically appears to have T2 N0 M0  Stag II, disease. Started on preoperative  concurrent chemoradiation  Now he has completed chemoradiation  A PET scan on 7/21/21 after chemoradiation showed no metabolic evidence of active neoplasm. Again seen is a rim calcified 3.5 cm cystic lesion abutting the lesser curvature of the stomach and pancreatic tail. Ongoing discussion with hepatologist and thoracic surgery at [address] [place] [address] because of his high risk comorbidities and liver disease. During today's visit, the patient and the family had a number of reasonable questions which were answered to their satisfaction. They verbalized understanding of the information provided and they agreed to proceed as outlined above. PLAN:   I reviewed the treatment plan with patient and family  He has completed chemoradiation  Patient has been evaluated by hematologist, thoracic surgery at [address] [place] [address],[address] and he was deemed high risk surgical candidate so surgery was recommended  He will get upper endoscopy every 3 to 4 months. His last endoscopy was on 8/31/2021 which showed Lezama's esophagus with high-grade dysplasia  I will get PET scan in 4 weeks  Return to clinic after the PET scan      Garrison Momin MD  Hematologist/Medical Oncologist      This note is created with the assistance of a speech recognition program.  While intending to generate a document that actually reflects the content of the visit, the document can still have some errors including those of syntax and sound a like substitutions which may escape proof reading. It such instances, actual meaning can be extrapolated by contextual diversion.

## 2021-09-24 ENCOUNTER — HOSPITAL ENCOUNTER (OUTPATIENT)
Dept: ULTRASOUND IMAGING | Age: 61
Discharge: HOME | End: 2021-09-26
Payer: MEDICARE

## 2021-09-24 DIAGNOSIS — K74.69 OTHER CIRRHOSIS OF LIVER (HCC): ICD-10-CM

## 2021-09-24 DIAGNOSIS — C15.9 MALIGNANT NEOPLASM OF ESOPHAGUS, UNSPECIFIED LOCATION (HCC): ICD-10-CM

## 2021-09-24 DIAGNOSIS — Z87.898: ICD-10-CM

## 2021-09-24 PROCEDURE — 76705 ECHO EXAM OF ABDOMEN: CPT

## 2021-09-30 ENCOUNTER — TELEPHONE (OUTPATIENT)
Dept: ONCOLOGY | Age: 61
End: 2021-09-30

## 2021-10-14 ENCOUNTER — TELEPHONE (OUTPATIENT)
Dept: ONCOLOGY | Age: 61
End: 2021-10-14

## 2021-10-14 DIAGNOSIS — C15.5 MALIGNANT NEOPLASM OF LOWER THIRD OF ESOPHAGUS (HCC): Primary | ICD-10-CM

## 2021-10-14 NOTE — TELEPHONE ENCOUNTER
Pt came into office stating that when he arrived for his PET scan that was supposed to be at 2pm today, he was told it was cancelled. Upon review of patients appt notes, Tamara Chandler cancelled pt appt yesterday.  According to pt, he was not notified and there are no notes or documentation in chart of notification to this office    Pt was told that writer would follow up with  and get denial information and forward to Dr Shad Garay who is in the office today     Electronically signed by Keyona Brooks on 10/14/2021 at 1:14 PM

## 2021-10-20 ENCOUNTER — HOSPITAL ENCOUNTER (OUTPATIENT)
Dept: CT IMAGING | Age: 61
Discharge: HOME | End: 2021-10-22
Payer: MEDICARE

## 2021-10-20 DIAGNOSIS — C15.5 MALIGNANT NEOPLASM OF LOWER THIRD OF ESOPHAGUS (HCC): ICD-10-CM

## 2021-10-20 LAB
BUN BLDV-MCNC: 4 MG/DL (ref 8–23)
CREAT SERPL-MCNC: 0.57 MG/DL (ref 0.7–1.2)
GFR AFRICAN AMERICAN: >60 ML/MIN
GFR NON-AFRICAN AMERICAN: >60 ML/MIN
GFR SERPL CREATININE-BSD FRML MDRD: ABNORMAL ML/MIN/{1.73_M2}
GFR SERPL CREATININE-BSD FRML MDRD: ABNORMAL ML/MIN/{1.73_M2}

## 2021-10-20 PROCEDURE — 2580000003 HC RX 258: Performed by: INTERNAL MEDICINE

## 2021-10-20 PROCEDURE — 84520 ASSAY OF UREA NITROGEN: CPT

## 2021-10-20 PROCEDURE — 74177 CT ABD & PELVIS W/CONTRAST: CPT

## 2021-10-20 PROCEDURE — 82565 ASSAY OF CREATININE: CPT

## 2021-10-20 PROCEDURE — 6360000004 HC RX CONTRAST MEDICATION: Performed by: INTERNAL MEDICINE

## 2021-10-20 PROCEDURE — 36415 COLL VENOUS BLD VENIPUNCTURE: CPT

## 2021-10-20 RX ORDER — 0.9 % SODIUM CHLORIDE 0.9 %
80 INTRAVENOUS SOLUTION INTRAVENOUS ONCE
Status: COMPLETED | OUTPATIENT
Start: 2021-10-20 | End: 2021-10-20

## 2021-10-20 RX ORDER — SODIUM CHLORIDE 0.9 % (FLUSH) 0.9 %
10 SYRINGE (ML) INJECTION PRN
Status: DISCONTINUED | OUTPATIENT
Start: 2021-10-20 | End: 2021-10-23 | Stop reason: HOSPADM

## 2021-10-20 RX ADMIN — IOHEXOL 50 ML: 240 INJECTION, SOLUTION INTRATHECAL; INTRAVASCULAR; INTRAVENOUS; ORAL at 11:40

## 2021-10-20 RX ADMIN — SODIUM CHLORIDE 80 ML: 9 INJECTION, SOLUTION INTRAVENOUS at 11:40

## 2021-10-20 RX ADMIN — IOPAMIDOL 100 ML: 755 INJECTION, SOLUTION INTRAVENOUS at 11:41

## 2021-10-20 RX ADMIN — SODIUM CHLORIDE, PRESERVATIVE FREE 10 ML: 5 INJECTION INTRAVENOUS at 11:41

## 2021-10-27 ENCOUNTER — OFFICE VISIT (OUTPATIENT)
Dept: GASTROENTEROLOGY | Age: 61
End: 2021-10-27
Payer: MEDICARE

## 2021-10-27 VITALS
TEMPERATURE: 97.5 F | BODY MASS INDEX: 31.07 KG/M2 | HEART RATE: 83 BPM | OXYGEN SATURATION: 96 % | HEIGHT: 70 IN | SYSTOLIC BLOOD PRESSURE: 123 MMHG | DIASTOLIC BLOOD PRESSURE: 78 MMHG | WEIGHT: 217 LBS

## 2021-10-27 DIAGNOSIS — K22.711 BARRETT'S ESOPHAGUS WITH HIGH GRADE DYSPLASIA: ICD-10-CM

## 2021-10-27 DIAGNOSIS — K70.30 ALCOHOLIC CIRRHOSIS OF LIVER WITHOUT ASCITES (HCC): Primary | ICD-10-CM

## 2021-10-27 DIAGNOSIS — C15.5 MALIGNANT NEOPLASM OF LOWER THIRD OF ESOPHAGUS (HCC): ICD-10-CM

## 2021-10-27 DIAGNOSIS — Z86.19 HISTORY OF HEPATITIS C: ICD-10-CM

## 2021-10-27 PROCEDURE — APPSS45 APP SPLIT SHARED TIME 31-45 MINUTES: Performed by: NURSE PRACTITIONER

## 2021-10-27 PROCEDURE — 99214 OFFICE O/P EST MOD 30 MIN: CPT | Performed by: INTERNAL MEDICINE

## 2021-10-27 ASSESSMENT — ENCOUNTER SYMPTOMS
SORE THROAT: 0
NAUSEA: 0
TROUBLE SWALLOWING: 0
BACK PAIN: 0
CHOKING: 0
CONSTIPATION: 0
SHORTNESS OF BREATH: 0
ANAL BLEEDING: 0
COUGH: 0
VOMITING: 0
RECTAL PAIN: 0
ALLERGIC/IMMUNOLOGIC NEGATIVE: 1
BLOOD IN STOOL: 0
VOICE CHANGE: 0
ABDOMINAL DISTENTION: 0
DIARRHEA: 0
ABDOMINAL PAIN: 0
RESPIRATORY NEGATIVE: 1

## 2021-10-27 NOTE — PROGRESS NOTES
GI OFFICE FOLLOW UP    INTERVAL HISTORY:   No referring provider defined for this encounter. Chief Complaint   Patient presents with    Follow-up     Patient is here for a 3 week f/u and test results       1. Alcoholic cirrhosis of liver without ascites ([address]    2. History of hepatitis C    3. Malignant neoplasm of lower third of esophagus (HCC)    4. Lezama's esophagus with high grade dysplasia        HISTORY OF PRESENT ILLNESS:     Being seen for follow-up labs, CT abdomen and pelvis. Patient has history of esophageal cancer. Status post radiation completed on 6/9/2021. PET scan on 7/23/2021 showed no evidence of recurrence. Patient was evaluated by thoracic surgeon and hepatologist at [facility]. Not candidate for esophagectomy due to liver failure. Patient had surveillance EGD with on 8/31/2021. Noted to have radiation-induced esophagitis of the lower esophagus. Very friable mucosa at the GE junction may be secondary to radiation therapy. Previous lesion site is very difficult to identify given that this was a thin plaque lesion. Multiple biopsies taken revealed Lezama's esophagus with high-grade dysplasia. At present patient is doing well. He denies any abdominal pain, nausea, vomiting. He is tolerating diet well. No significant dysphagia or odynophagia. Weight is stable    Bowel movements are satisfactory. Denies any melena, hematochezia. Past Medical,Family, and Social History reviewed and does contribute to the patient presenting condition. Patient's PMH/PSH,SH,PSYCH Hx, MEDs, ALLERGIES, and ROS were all reviewed and updated in the appropriate sections.  Yes      PAST MEDICAL HISTORY:  Past Medical History:   Diagnosis Date    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 [facility]     Anxiety     Arthritis     Back pain, chronic     dr. Jocelyne Smith, orthopedic, every 3-4 months, gets steroid injection    Lezama esophagus     BPH (benign prostatic hypertrophy)     Cholelithiasis     Cirrhosis ([address]     COVID-19 12/2020    pt reports he had a positive test while at [facility] in 2020, was asymptomatic    COVID-19 vaccine series completed 5/20/2021, 6/22/2021    Moderna 5/20/2021, 6/22/2021    DDD (degenerative disc disease), lumbar     Depression     Esophageal cancer ([facility]     INVASIVE ADENOCARCINOMA ARISING IN TUBULAR ADENOMA WITH HIGH GRADE DYSPLASIA, ASSOCIATED WITH FOCAL INTESTINAL METAPLASIA     Esophageal varices ([address]     Fatty liver     GERD (gastroesophageal reflux disease)     Hep C w/o coma, chronic (HCC)     History of alcohol abuse     6-12 beers a day; quit drinking July 2016    History of blood transfusion     History of colon polyps 2016    History of tobacco abuse     Belk[place]ski (hard of hearing)     Hyperlipidemia     Hypertension     Port-A-Cath in place     right upper chest    Stomach ulcer     hx of    Tubular adenoma of colon 2016, 2018    Vitamin D deficiency     Wears glasses        Past Surgical History:   Procedure Laterality Date    BUNIONECTOMY      twice on right side    BUNIONECTOMY Left     CARPAL TUNNEL RELEASE Right     COLONOSCOPY      at age 35    COLONOSCOPY  10/05/2016    polyps-pathology tubular adenoma, and abnormal looking mucosa right colon-pathology-tubular adenoma    COLONOSCOPY N/A 3/30/2018    COLONOSCOPY POLYPECTOMY COLD BIOPSY performed by Raya Fischer MD at [address] [place] [address]  03/30/2018    Small polyp in the sigmoid colon and excised with biopsy forceps--tubular adenoma    ENDOSCOPY, COLON, DIAGNOSTIC      EGD    IR PORT PLACEMENT EQUAL OR GREATER THAN 5 YEARS  4/19/2021    IR PORT PLACEMENT EQUAL OR GREATER THAN 5 YEARS 4/19/2021 [facility] SPECIAL PROCEDURES    KNEE SURGERY Left     cyst removed    NASAL SEPTUM SURGERY      NERVE BLOCK Right 11/23/2020    NERVE BLOCK RIGHT CERVICAL STEROID INJECTION  C3-C6 performed by Lalo Mercedes MD at [place] [address]  01/04/16    steroid injection C7 T1    OTHER SURGICAL HISTORY  11/21/2016    Bilateral Lumbar CACHORRO L4-L5 injections    OTHER SURGICAL HISTORY  12/19/2016    lumbar steroid injection    OTHER SURGICAL HISTORY  09/28/2018    BILATERAL L5 CACHORRO (N/A Back)    OTHER SURGICAL HISTORY Right 11/23/2020    cervical injection    PAIN MANAGEMENT PROCEDURE Left 7/9/2020    EPIDURAL STEROID INJECTION LEFT L4 L5 performed by Lalo Mercedes MD at [place] [address] Left 7/20/2020    LEFT L4 L5 EPIDURAL STEROID INJECTION performed by Lalo Mercedes MD at [place] [address] Bilateral 8/17/2020    LUMBAR FACET BILATERAL L2-L5 performed by Lalo Mercedes MD at [place] [address] Bilateral 12/7/2020    NERVE BLOCK BILATERAL LUMBAR MEDIAL BRANCH L2-L5 performed by Lalo Mercedes MD at [address] AA&/STRD TFRML EPI LUMBAR/SACRAL 1 LEVEL Bilateral 9/6/2018    BILATERAL L5 CACHORRO performed by Lalo Mercedes MD at [address] AA&/STRD TFRML EPI LUMBAR/SACRAL 1 LEVEL N/A 9/28/2018    BILATERAL L5 CACHORRO performed by Lalo Mercedes MD at [address] [facility] N/CARPAL TUNNEL SURG Right 8/29/2017    CARPAL TUNNEL RELEASE RIGHT performed by Milady Saenz MD at [address] [facility] N/CARPAL TUNNEL SURG Left 10/31/2017    CARPAL TUNNEL RELEASE performed by Milady Saenz MD at [address] [place] [address] 12/29/2020    EGD BIOPSY performed by Ileana Salinas MD at [address] [place] [address] 2/2/2021    EGD BIOPSY and spot marking performed by Kassandra Abarca MD at [address] [place] [address] N/A 2/12/2021    ENDOSCOPIC ULTRASOUND, EGD performed by Marney Hammans, MD at [address] [place] [address]  2/12/2021    EGD DIAGNOSTIC ONLY performed by Marney Hammans, MD at [id number] [place] [address] UPPER GASTROINTESTINAL ENDOSCOPY N/A 8/31/2021    EGD BIOPSY performed by Min Smith MD at [facility]:    Current Outpatient Medications:     VITAMIN D, CHOLECALCIFEROL, PO, Take by mouth Takes an OTC dose, unsure of dosage, Disp: , Rfl:     FLUoxetine (PROZAC) 20 MG capsule, Take 1 capsule by mouth daily, Disp: 30 capsule, Rfl: 3    omeprazole (PRILOSEC) 40 MG delayed release capsule, Take 1 capsule by mouth every morning (before breakfast), Disp: 30 capsule, Rfl: 2    lidocaine-prilocaine (EMLA) 2.5-2.5 % cream, Apply topically 45-60 mins prior to needle poke daily PRN, Disp: 1 Tube, Rfl: 2    ondansetron (ZOFRAN-ODT) 8 MG TBDP disintegrating tablet, Place 1 tablet under the tongue 3 times daily as needed for Nausea or Vomiting, Disp: 90 tablet, Rfl: 2    D3-50 1.25 MG (10749 UT) CAPS, once a week On Tuesdays, Disp: , Rfl:     hydrOXYzine (VISTARIL) 25 MG capsule, take 1 capsule by mouth three times a day if needed for anxiety, Disp: 90 capsule, Rfl: 2    atorvastatin (LIPITOR) 20 MG tablet, take 1 tablet by mouth at bedtime, Disp: 90 tablet, Rfl: 1    spironolactone (ALDACTONE) 50 MG tablet, take 1 tablet by mouth once daily, Disp: 90 tablet, Rfl: 3    vitamin D (ERGOCALCIFEROL) 1.25 MG (11772 UT) CAPS capsule, take 1 capsule by mouth every week, Disp: 12 capsule, Rfl: 1    ALLERGIES:   Allergies   Allergen Reactions    Pollen Extract      Runny nose, watery eyes       FAMILY HISTORY:       Problem Relation Age of Onset    Cancer Mother         pancreatic    Cancer Father         bone    Diabetes Sister     Asthma Brother          SOCIAL HISTORY:   Social History     Socioeconomic History    Marital status: Single     Spouse name: Not on file    Number of children: Not on file    Years of education: Not on file    Highest education level: Not on file   Occupational History    Not on file   Tobacco Use    Smoking status: Former Smoker     Packs/day: 1.00     Years: 45.00     Pack years: 37.1     Quit date: 2017     Years since quittin.7    Smokeless tobacco: Never Used   Vaping Use    Vaping Use: Never used   Substance and Sexual Activity    Alcohol use: Not Currently     Comment: quit     Drug use: Not Currently     Frequency: 1.0 times per week     Comment: cocaine,  stopped spring 2016    Sexual activity: Yes     Partners: Female   Other Topics Concern    Not on file   Social History Narrative     in the past, retired     Social Determinants of [address] Strain: [address] Difficulty of Paying Living Expenses: Not hard at all   Food Insecurity: No Food Insecurity    Worried About [address] in the Last Year: Never true   [address] in the Last Year: Never true   Transportation Needs: No Transportation Needs    Lack of Transportation (Medical): No    Lack of Transportation (Non-Medical): No   Physical Activity:     Days of Exercise per Week:     Minutes of Exercise per Session:    Stress:     Feeling of Stress :    Social Connections:     Frequency of Communication with Friends and Family:     Frequency of Social Gatherings with Friends and Family:     Attends Orthodoxy Services:     Active Member of Clubs or Organizations:     Attends Club or Organization Meetings:     Marital Status:    Intimate Partner Violence:     Fear of Current or Ex-Partner:     Emotionally Abused:     Physically Abused:     Sexually Abused:          REVIEW OF SYSTEMS:         Review of Systems   Constitutional: Positive for fatigue. Negative for appetite change and unexpected weight change. HENT: Positive for hearing loss (rt ear). Negative for sore throat, trouble swallowing and voice change. Eyes: Positive for visual disturbance (wears glasses). Respiratory: Negative. Negative for cough, choking and shortness of breath. Cardiovascular: Negative.   Negative for chest pain and leg swelling. Gastrointestinal: Negative for abdominal distention, abdominal pain, anal bleeding, blood in stool, constipation, diarrhea, nausea, rectal pain and vomiting. Denies   Endocrine: Negative. Negative for polydipsia, polyphagia and polyuria. Genitourinary: Negative for difficulty urinating, frequency, hematuria and urgency. Musculoskeletal: Negative for back pain, joint swelling and myalgias. Skin: Negative. Allergic/Immunologic: Negative. Negative for environmental allergies, food allergies and immunocompromised state. Neurological: Negative for dizziness, tremors, weakness, light-headedness, numbness and headaches. Hematological: Bruises/bleeds easily. Psychiatric/Behavioral: Positive for sleep disturbance. The patient is nervous/anxious. PHYSICAL EXAMINATION:     Vital signs reviewed per the nursing documentation. /78   Pulse 83   Temp 97.5 °F (36.4 °C)   Ht 5' 10\" (1.778 m)   Wt 217 lb (98.4 kg)   SpO2 96%   BMI 31.14 kg/m²   Body mass index is 31.14 kg/m². Physical Exam  Constitutional:       Appearance: Normal appearance. Eyes:      General: No scleral icterus. Pupils: Pupils are equal, round, and reactive to light. Cardiovascular:      Rate and Rhythm: Normal rate and regular rhythm. Heart sounds: Normal heart sounds. Pulmonary:      Effort: Pulmonary effort is normal.      Breath sounds: Normal breath sounds. Abdominal:      General: Bowel sounds are normal. There is no distension. Palpations: Abdomen is soft. There is no mass. Tenderness: There is no abdominal tenderness. There is no guarding. Skin:     General: Skin is warm and dry. Coloration: Skin is not jaundiced. Neurological:      Mental Status: He is alert and oriented to person, place, and time. Mental status is at baseline.        LABORATORY DATA: Reviewed  Lab Results   Component Value Date    WBC 4.8 09/21/2021    HGB 12.4 (L) 09/21/2021 HCT 37.5 (L) 09/21/2021    MCV 95.0 09/21/2021     (L) 09/21/2021     08/19/2021     08/19/2021    K 4.4 08/19/2021    K 4.4 08/19/2021     08/19/2021     08/19/2021    CO2 26 08/19/2021    CO2 26 08/19/2021    BUN 4 (L) 10/20/2021    CREATININE 0.57 (L) 10/20/2021    LABPROT 7.7 04/19/2012    LABALBU 3.4 (L) 08/19/2021    LABALBU 3.4 (L) 08/19/2021    BILITOT 1.32 (H) 08/19/2021    BILITOT 1.32 (H) 08/19/2021    ALKPHOS 75 08/19/2021    ALKPHOS 75 08/19/2021    AST 39 08/19/2021    AST 39 08/19/2021    ALT 10 08/19/2021    ALT 10 08/19/2021    INR 1.1 08/19/2021         Lab Results   Component Value Date    RBC 3.95 (L) 09/21/2021    HGB 12.4 (L) 09/21/2021    MCV 95.0 09/21/2021    MCH 31.5 09/21/2021    MCHC 33.2 09/21/2021    RDW 14.1 09/21/2021    MPV 7.7 09/21/2021    BASOPCT 0 08/19/2021    BASOPCT 0 08/19/2021    LYMPHSABS 0.70 (L) 08/19/2021    LYMPHSABS 0.70 (L) 08/19/2021    MONOSABS 0.60 08/19/2021    MONOSABS 0.60 08/19/2021    NEUTROABS 4.50 08/19/2021    NEUTROABS 4.50 08/19/2021    EOSABS 0.10 08/19/2021    EOSABS 0.10 08/19/2021    BASOSABS 0.00 08/19/2021    BASOSABS 0.00 08/19/2021         DIAGNOSTIC TESTING:     CT CHEST ABDOMEN PELVIS W CONTRAST    Result Date: 10/20/2021  EXAMINATION: CT OF THE CHEST, ABDOMEN, AND PELVIS WITH CONTRAST 10/20/2021 11:33 am TECHNIQUE: CT of the chest, abdomen and pelvis was performed with the administration of intravenous contrast. Multiplanar reformatted images are provided for review. Dose modulation, iterative reconstruction, and/or weight based adjustment of the mA/kV was utilized to reduce the radiation dose to as low as reasonably achievable. COMPARISON: Ultrasound liver 09/24/2021. PET-CT 04/08/2021. CT chest abdomen and pelvis 03/12/2021.  HISTORY: ORDERING SYSTEM PROVIDED HISTORY: Malignant neoplasm of lower third of esophagus [facility]) TECHNOLOGIST PROVIDED HISTORY: follow up Reason for Exam: follow up, Malignant neoplasm of lower third of esophagus Acuity: Chronic Type of Exam: Subsequent/Follow-up Relevant Medical/Surgical History: gerd, cirrhosis, htn, port FINDINGS: Chest: Mediastinum: The heart and great vessels are normal in size. Calcified atheromatous plaque and coronary calcifications are noted. No pericardial effusion. No enlarged or suspicious-appearing lymph nodes are identified. The esophagus is decompressed. Serpiginous mucosal enhancement in the distal 1/3 of the esophagus is noted, which in part may represent mucosal enhancement and/or underlying varices. Lungs/pleura: Mild paraseptal emphysematous disease. Subpleural ground-glass in the medial lower lobes is noted, which may represent scarring. Nonspecific subpleural reticular opacities in the lung bases are also noted. No consolidation, mass or suspicious nodule. Sequela of old granulomatous disease is noted. Soft Tissues/Bones: Bilateral gynecomastia. Chronic mild anterior wedging of T12. No acute osseous abnormality identified. Abdomen/Pelvis: Organs: Morphologic findings of cirrhosis are again demonstrated. No focal liver lesion identified. Small recannulized paraumbilical vein and gastrohepatic ligament varices are present. The spleen is normal in size. The gallbladder is contracted with small stones present. No biliary dilatation. The pancreas, spleen, adrenals and kidneys reveal no acute findings. Incidental note is made of a thrombosed peripherally calcified splenic artery aneurysm measuring up to 3.4 cm, unchanged. GI/Bowel: Oral contrast is present to the level of the rectum. Mild regional wall thickening in the proximal colon and distal ileum is noted without adjacent inflammatory change. Normal appendix. Pelvis: No acute findings. Peritoneum/Retroperitoneum: Trace abdominopelvic ascites. No free air. No lymphadenopathy. Bones/Soft Tissues: No acute findings.   Multilevel degenerative change with disc disease and facet arthropathy in the lumbar spine. 1.  Stable exam of the chest, abdomen and pelvis without evidence for metastatic disease. 2.  The esophagus is decompressed. Mucosal enhancement in the distal esophagus may be due to underlying varices. Underlying inflammation or mass is considered less likely given the stability of this finding and recent negative PET-CT. 3.  Morphologic findings of cirrhosis and portal hypertension again demonstrated. No focal liver lesion identified. Trace abdominopelvic ascites. Small varices. Assessment  1. Alcoholic cirrhosis of liver without ascites ([address]    2. History of hepatitis C    3. Malignant neoplasm of lower third of esophagus (HCC)    4. Lezama's esophagus with high grade dysplasia        Plan    Has hx of esophageal cancer. S/p radiation. PET scan on 7/23/21 did not reveal cancer reoccurrence. CT chest abdomen pelvis -  May have underlying varices, inflammation, mass. EGD revealed Lezama's with high grade dysplasia. Also has radiation induced esophagitis. May need RFA. Not candidate for esophagectomy d/t liver disease. [facility] Follow-up with Dr. Iris Vu. Thank you for allowing me to participate in the care of Mr. Amira Allen. For any further questions please do not hesitate to contact me. I have reviewed and agree with the ROS entered by the MA/LPN. Note is dictated utilizing voice recognition software. Unfortunately this leads to occasional typographical errors. Please contact our office if you have any questions    GI attending physician note. Patient seen with APRN   I independently performed an evaluation on Claretta Mar. I have reviewed the above documentation completed by the Nurse Practitioner and agree with the history, examination, and management plan. Recommendations discussed.                    Reyes Esquivel MD,FACP, [facility]  Board Certified in Gastroenterology and [facility]  Office #: [phone]

## 2021-10-29 ENCOUNTER — HOSPITAL ENCOUNTER (OUTPATIENT)
Dept: RADIATION ONCOLOGY | Age: 61
Discharge: HOME | End: 2021-10-29
Attending: RADIOLOGY
Payer: MEDICARE

## 2021-10-29 ENCOUNTER — TELEPHONE (OUTPATIENT)
Dept: ONCOLOGY | Age: 61
End: 2021-10-29

## 2021-10-29 ENCOUNTER — HOSPITAL ENCOUNTER (OUTPATIENT)
Dept: INFUSION THERAPY | Age: 61
Discharge: HOME | End: 2021-10-29
Payer: MEDICARE

## 2021-10-29 ENCOUNTER — OFFICE VISIT (OUTPATIENT)
Dept: ONCOLOGY | Age: 61
End: 2021-10-29
Payer: MEDICARE

## 2021-10-29 VITALS
WEIGHT: 209 LBS | TEMPERATURE: 98 F | OXYGEN SATURATION: 98 % | DIASTOLIC BLOOD PRESSURE: 90 MMHG | HEART RATE: 90 BPM | SYSTOLIC BLOOD PRESSURE: 150 MMHG | BODY MASS INDEX: 29.99 KG/M2 | RESPIRATION RATE: 16 BRPM

## 2021-10-29 VITALS
SYSTOLIC BLOOD PRESSURE: 134 MMHG | DIASTOLIC BLOOD PRESSURE: 79 MMHG | RESPIRATION RATE: 16 BRPM | TEMPERATURE: 97.9 F | BODY MASS INDEX: 31.14 KG/M2 | WEIGHT: 217 LBS | HEART RATE: 86 BPM

## 2021-10-29 DIAGNOSIS — C15.5 MALIGNANT NEOPLASM OF LOWER THIRD OF ESOPHAGUS (HCC): ICD-10-CM

## 2021-10-29 DIAGNOSIS — C15.5 MALIGNANT NEOPLASM OF LOWER THIRD OF ESOPHAGUS (HCC): Primary | ICD-10-CM

## 2021-10-29 DIAGNOSIS — R97.8 OTHER ABNORMAL TUMOR MARKERS: ICD-10-CM

## 2021-10-29 PROCEDURE — 99212 OFFICE O/P EST SF 10 MIN: CPT | Performed by: RADIOLOGY

## 2021-10-29 PROCEDURE — 2580000003 HC RX 258: Performed by: INTERNAL MEDICINE

## 2021-10-29 PROCEDURE — 99213 OFFICE O/P EST LOW 20 MIN: CPT | Performed by: RADIOLOGY

## 2021-10-29 PROCEDURE — 96523 IRRIG DRUG DELIVERY DEVICE: CPT

## 2021-10-29 PROCEDURE — 99214 OFFICE O/P EST MOD 30 MIN: CPT | Performed by: INTERNAL MEDICINE

## 2021-10-29 PROCEDURE — 6360000002 HC RX W HCPCS: Performed by: INTERNAL MEDICINE

## 2021-10-29 PROCEDURE — 99211 OFF/OP EST MAY X REQ PHY/QHP: CPT | Performed by: INTERNAL MEDICINE

## 2021-10-29 RX ORDER — HEPARIN SODIUM (PORCINE) LOCK FLUSH IV SOLN 100 UNIT/ML 100 UNIT/ML
500 SOLUTION INTRAVENOUS PRN
Status: CANCELLED | OUTPATIENT
Start: 2021-10-29

## 2021-10-29 RX ORDER — SODIUM CHLORIDE 9 MG/ML
25 INJECTION, SOLUTION INTRAVENOUS PRN
Status: CANCELLED | OUTPATIENT
Start: 2021-10-29

## 2021-10-29 RX ORDER — SODIUM CHLORIDE 0.9 % (FLUSH) 0.9 %
5-40 SYRINGE (ML) INJECTION PRN
Status: CANCELLED | OUTPATIENT
Start: 2021-10-29

## 2021-10-29 RX ORDER — HEPARIN SODIUM (PORCINE) LOCK FLUSH IV SOLN 100 UNIT/ML 100 UNIT/ML
500 SOLUTION INTRAVENOUS PRN
Status: DISCONTINUED | OUTPATIENT
Start: 2021-10-29 | End: 2021-10-30 | Stop reason: HOSPADM

## 2021-10-29 RX ORDER — SODIUM CHLORIDE 0.9 % (FLUSH) 0.9 %
5-40 SYRINGE (ML) INJECTION PRN
Status: DISCONTINUED | OUTPATIENT
Start: 2021-10-29 | End: 2021-10-30 | Stop reason: HOSPADM

## 2021-10-29 RX ADMIN — HEPARIN SODIUM (PORCINE) LOCK FLUSH IV SOLN 100 UNIT/ML 500 UNITS: 100 SOLUTION at 11:09

## 2021-10-29 RX ADMIN — SODIUM CHLORIDE, PRESERVATIVE FREE 10 ML: 5 INJECTION INTRAVENOUS at 11:10

## 2021-10-29 RX ADMIN — SODIUM CHLORIDE, PRESERVATIVE FREE 10 ML: 5 INJECTION INTRAVENOUS at 11:03

## 2021-10-29 NOTE — PROGRESS NOTES
Patient ID: Francis Montano, [birth year], [id number], 57 y.o. Referred by : Dr Vee Smalls  Reason for consultation:   Esophageal cancer T2N0Mx  Stage II   started on concurrent chemoradiation preoperatively on 5/4/21  Chemoradiation completed 6/9/21  Patient had a PET scan on 7/23/2021 which showed no evidence of recurrence  Patient has been evaluated by thoracic surgeon at [facility] Dr. Mark Fulton and also hepatologist Dr. Candelario Jeffery his case was discussed at thoracic tumor oncology board with recommendations NOT to do any surgery because of his liver failure. So surveillance recommended  He had an upper endoscopy on 8/31 which showed no residual cancer but showed Lezama's esophagus with high-grade dysplasia. GI planning to consider RFA  HISTORY OF PRESENT ILLNESS:    Oncologic History:  Francis Montano is a 57 y.o. male with a history of hepatitis C, status post treatment, liver cirrhosis, history of alcohol abuse was seen during initial consultation visit for newly diagnosed esophageal cancer. Patient reportedly had \"heavy drinking alcohol in about 2016 however recently in December he had 2 beers and he presented with hematemesis. On 12/29/2020 he had upper endoscopy which showed severe portal hypertension, gastropathy. The biopsy from the GE junction showed invasive adenocarcinoma. Patient had a follow-up EGD on 2/2/2021 which confirmed esophageal adenocarcinoma. Patient had endoscopic ultrasound on 2/12/2021 which showed T2 N0 MX disease with underlying esophageal varices. In the esophagus hypoechoic lesion noted in the lower esophagus penetrating into submucosa and into muscularis propria. No lymphadenopathy noted. Patient was referred to medical oncology for further recommendations. He denies any difficulty swallowing. He does not smoke he has quit smoking in 2016. He has not drank alcohol since December. He has a history of hepatitis C and he has received treatment.     He lives by himself. He is accompanied by his ex-wife during today's office visit. INTERVAL HISTORY:   Patient is returning for follow-up visit and to discuss further recommendations. He had a restaging CT scan and is here to discuss results. CT scan showed no evidence of recurrence. He is swallowing well. He denies any pain, abdominal pain nausea vomiting. During this visit patient's allergy, social, medical, surgical history and medications were reviewed and updated.     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 Tati Lu, orthopedic, every 3-4 months, gets steroid injection    Lezama esophagus     BPH (benign prostatic hypertrophy)     Cholelithiasis     Cirrhosis ([address]     COVID-19 12/2020    pt reports he had a positive test while at [facility] in 2020, was asymptomatic    COVID-19 vaccine series completed 5/20/2021, 6/22/2021    Moderna 5/20/2021, 6/22/2021    DDD (degenerative disc disease), lumbar     Depression     Esophageal cancer ([address]     INVASIVE ADENOCARCINOMA ARISING IN TUBULAR ADENOMA WITH HIGH GRADE DYSPLASIA, ASSOCIATED WITH FOCAL INTESTINAL METAPLASIA     Esophageal varices ([address]     Fatty liver     GERD (gastroesophageal reflux disease)     Hep C w/o coma, chronic ([address]     History of alcohol abuse     6-12 beers a day; quit drinking July 2016    History of blood transfusion     History of colon polyps 2016    History of tobacco abuse     [place] (hard of hearing)     Hyperlipidemia     Hypertension     Port-A-Cath in place     right upper chest    Stomach ulcer     hx of    Tubular adenoma of colon 2016, 2018    Vitamin D deficiency     Wears glasses        Past Surgical History:   Procedure Laterality Date    BUNIONECTOMY      twice on right side    BUNIONECTOMY Left     CARPAL TUNNEL RELEASE Right     COLONOSCOPY      at age 35    COLONOSCOPY  10/05/2016 polyps-pathology tubular adenoma, and abnormal looking mucosa right colon-pathology-tubular adenoma    COLONOSCOPY N/A 3/30/2018    COLONOSCOPY POLYPECTOMY COLD BIOPSY performed by Jennifer Barlow MD at [address]  03/30/2018    Small polyp in the sigmoid colon and excised with biopsy forceps--tubular adenoma    ENDOSCOPY, COLON, DIAGNOSTIC      EGD    IR PORT PLACEMENT EQUAL OR GREATER THAN 5 YEARS  4/19/2021    IR PORT PLACEMENT EQUAL OR GREATER THAN 5 YEARS 4/19/2021 [facility] SPECIAL PROCEDURES    KNEE SURGERY Left     cyst removed    NASAL SEPTUM SURGERY      NERVE BLOCK Right 11/23/2020    NERVE BLOCK RIGHT CERVICAL STEROID INJECTION  C3-C6 performed by Guanaco Chambers MD at [address]  01/04/16    steroid injection C7 T1    OTHER SURGICAL HISTORY  11/21/2016    Bilateral Lumbar CACHORRO L4-L5 injections    OTHER SURGICAL HISTORY  12/19/2016    lumbar steroid injection    OTHER SURGICAL HISTORY  09/28/2018    BILATERAL L5 CACHORRO (N/A Back)    OTHER SURGICAL HISTORY Right 11/23/2020    cervical injection    PAIN MANAGEMENT PROCEDURE Left 7/9/2020    EPIDURAL STEROID INJECTION LEFT L4 L5 performed by Guanaco Chambers MD at [address] Left 7/20/2020    LEFT L4 L5 EPIDURAL STEROID INJECTION performed by Guanaco Chambers MD at [facility] Bilateral 8/17/2020    LUMBAR FACET BILATERAL L2-L5 performed by Guanaco Chambers MD at [facility] Bilateral 12/7/2020    [facility] L2-L5 performed by Guanaco Chambers MD at [address] AA&/STRD TFRML EPI LUMBAR/SACRAL 1 LEVEL Bilateral 9/6/2018    BILATERAL L5 CACHORRO performed by Guanaco Chambers MD at [address] AA&/STRD TFRML EPI LUMBAR/SACRAL 1 LEVEL N/A 9/28/2018    BILATERAL L5 CACHORRO performed by Guanaco Chambers MD at [address] [facility] N/CARPAL TUNNEL SURG Right 8/29/2017    CARPAL TUNNEL RELEASE RIGHT performed by Krishna Riojas MD at [address] N/CARPAL TUNNEL SURG Left 10/31/2017    CARPAL TUNNEL RELEASE performed by Krishna Riojas MD at [address] N/A 12/29/2020    EGD BIOPSY performed by Tanvi De Leon MD at [address] [place] [address] 2/2/2021    EGD BIOPSY and spot marking performed by Martha Christensen MD at [address] [place] [address] 2/12/2021    ENDOSCOPIC ULTRASOUND, EGD performed by Xuan Sweeney MD at [address] [place] [address]  2/12/2021    EGD DIAGNOSTIC ONLY performed by Xuan Sweeney MD at [address] [place] [address] N/A 8/31/2021    EGD BIOPSY performed by Martha Christensen MD at [address] [facility]   Allergen Reactions    Pollen Extract      Runny nose, watery eyes       Current Outpatient Medications   Medication Sig Dispense Refill    VITAMIN D, CHOLECALCIFEROL, PO Take by mouth Takes an OTC dose, unsure of dosage      FLUoxetine (PROZAC) 20 MG capsule Take 1 capsule by mouth daily 30 capsule 3    omeprazole (PRILOSEC) 40 MG delayed release capsule Take 1 capsule by mouth every morning (before breakfast) 30 capsule 2    lidocaine-prilocaine (EMLA) 2.5-2.5 % cream Apply topically 45-60 mins prior to needle poke daily PRN 1 Tube 2    ondansetron (ZOFRAN-ODT) 8 MG TBDP disintegrating tablet Place 1 tablet under the tongue 3 times daily as needed for Nausea or Vomiting 90 tablet 2    D3-50 1.25 MG (98130 UT) CAPS once a week On Tuesdays      hydrOXYzine (VISTARIL) 25 MG capsule take 1 capsule by mouth three times a day if needed for anxiety 90 capsule 2    atorvastatin (LIPITOR) 20 MG tablet take 1 tablet by mouth at bedtime 90 tablet 1    spironolactone (ALDACTONE) 50 MG tablet take 1 tablet by mouth once daily 90 tablet 3    vitamin D (ERGOCALCIFEROL) 1.25 MG (53833 UT) CAPS capsule take 1 capsule by mouth every week 12 capsule 1     No current facility-administered medications for this visit. Social History     Socioeconomic History    Marital status: Single     Spouse name: Not on file    Number of children: Not on file    Years of education: Not on file    Highest education level: Not on file   Occupational History    Not on file   Tobacco Use    Smoking status: Former Smoker     Packs/day: 1.00     Years: 45.00     Pack years: 45.00     Quit date: 2017     Years since quittin.7    Smokeless tobacco: Never Used   Vaping Use    Vaping Use: Never used   Substance and Sexual Activity    Alcohol use: Not Currently     Comment: quit     Drug use: Not Currently     Frequency: 1.0 times per week     Comment: cocaine,  stopped spring 2016    Sexual activity: Yes     Partners: Female   Other Topics Concern    Not on file   Social History Narrative     in the past, retired     Social Determinants of Health     Financial Resource Strain: [address] Difficulty of Paying Living Expenses: Not hard at all   Food Insecurity: No Food Insecurity    Worried About [address] in the Last Year: Never true   [address] in the Last Year: Never true   Transportation Needs: No Transportation Needs    Lack of Transportation (Medical): No    Lack of Transportation (Non-Medical):  No   Physical Activity:     Days of Exercise per Week:     Minutes of Exercise per Session:    Stress:     Feeling of Stress :    Social Connections:     Frequency of Communication with Friends and Family:     Frequency of Social Gatherings with Friends and Family:     Attends Yarsanism Services:     Active Member of Clubs or Organizations:     Attends Club or Organization Meetings:     Marital Status:    Intimate Partner Violence:     Fear of Current or Ex-Partner:     Emotionally Abused:     Physically Abused:     Sexually Abused:        Family History   Problem Relation Age of Onset    Cancer Mother         pancreatic    Cancer Father         bone    Diabetes Sister     Asthma Brother         REVIEW OF SYSTEM:     Constitutional: No fever or chills. No night sweats, no weight loss   Eyes: No eye discharge, double vision, or eye pain   HEENT: negative for sore mouth, sore throat, hoarseness and voice change   Respiratory: negative for cough , sputum, dyspnea, wheezing, hemoptysis, chest pain   Cardiovascular: negative for chest pain, dyspnea, palpitations, orthopnea, PND   Gastrointestinal: negative for nausea, vomiting, diarrhea, constipation, abdominal pain, Dysphagia, hematemesis and hematochezia   Genitourinary: negative for frequency, dysuria, nocturia, urinary incontinence, and hematuria   Integument: negative for rash, skin lesions, bruises.    Hematologic/Lymphatic: negative for easy bruising, bleeding, lymphadenopathy, petechiae and swelling/edema   Endocrine: negative for heat or cold intolerance, tremor, weight changes, change in bowel habits and hair loss   Musculoskeletal: negative for myalgias, arthralgias, pain, joint swelling,and bone pain   Neurological: negative for headaches, dizziness, seizures, weakness, numbness       OBJECTIVE:         Vitals:    10/29/21 1009   BP: 134/79   Pulse: 86   Resp: 16   Temp: 97.9 °F (36.6 °C)       PHYSICAL EXAM:   General appearance - well appearing, no in pain or distress   Mental status - alert and cooperative   Eyes - pupils equal and reactive, extraocular eye movements intact   Ears - bilateral TM's and external ear canals normal   Mouth - mucous membranes moist, pharynx normal without lesions   Neck - supple, no significant adenopathy   Lymphatics - no palpable lymphadenopathy, no hepatosplenomegaly   Chest - clear to auscultation, no wheezes, rales or rhonchi, symmetric air entry   Heart - normal rate, regular rhythm, normal S1, S2, no murmurs, rubs, clicks or gallops   Abdomen - soft, nontender, nondistended, no masses or organomegaly Neurological - alert, oriented, normal speech, no focal findings or movement disorder noted   Musculoskeletal - no joint tenderness, deformity or swelling   Extremities - peripheral pulses normal, no pedal edema, no clubbing or cyanosis   Skin - normal coloration and turgor, no rashes, no suspicious skin lesions noted   LABORATORY DATA:     Lab Results   Component Value Date    WBC 4.8 09/21/2021    HGB 12.4 (L) 09/21/2021    HCT 37.5 (L) 09/21/2021    MCV 95.0 09/21/2021     (L) 09/21/2021    LYMPHOPCT 11 (L) 08/19/2021    LYMPHOPCT 11 (L) 08/19/2021    RBC 3.95 (L) 09/21/2021    MCH 31.5 09/21/2021    MCHC 33.2 09/21/2021    RDW 14.1 09/21/2021    MONOPCT 11 08/19/2021    MONOPCT 11 08/19/2021    BASOPCT 0 08/19/2021    BASOPCT 0 08/19/2021    NEUTROABS 4.50 08/19/2021    NEUTROABS 4.50 08/19/2021    LYMPHSABS 0.70 (L) 08/19/2021    LYMPHSABS 0.70 (L) 08/19/2021    MONOSABS 0.60 08/19/2021    MONOSABS 0.60 08/19/2021    EOSABS 0.10 08/19/2021    EOSABS 0.10 08/19/2021    BASOSABS 0.00 08/19/2021    BASOSABS 0.00 08/19/2021       Chemistry        Component Value Date/Time     08/19/2021 1329     08/19/2021 1329    K 4.4 08/19/2021 1329    K 4.4 08/19/2021 1329     08/19/2021 1329     08/19/2021 1329    CO2 26 08/19/2021 1329    CO2 26 08/19/2021 1329    BUN 4 (L) 10/20/2021 1018    CREATININE 0.57 (L) 10/20/2021 1018        Component Value Date/Time    CALCIUM 9.7 08/19/2021 1329    CALCIUM 9.7 08/19/2021 1329    ALKPHOS 75 08/19/2021 1329    ALKPHOS 75 08/19/2021 1329    AST 39 08/19/2021 1329    AST 39 08/19/2021 1329    ALT 10 08/19/2021 1329    ALT 10 08/19/2021 1329    BILITOT 1.32 (H) 08/19/2021 1329    BILITOT 1.32 (H) 08/19/2021 1329        PATHOLOGY DATA:   Surgical Pathology Report  Surgical Pathology  Collected: 12/29/20 1334   Lab status: Final   Resulting lab: [facility]   Value: [id number]   [facility]   [address], [address] [facility]   [phone]   Fax: [phone]   SURGICAL PATHOLOGY REPORT     Patient Name: Maria E Berkowitz   MR#: [id number]   Specimen #[id number]         Final Diagnosis   GASTROESOPHAGEAL JUNCTION, BIOPSY:          INVASIVE ADENOCARCINOMA    Final Diagnosis   ESOPHAGUS, LESION AT 34 CM, BIOPSY:          INVASIVE ADENOCARCINOMA ARISING IN TUBULAR ADENOMA WITH HIGH   GRADE DYSPLASIA, ASSOCIATED WITH FOCAL INTESTINAL METAPLASIA (SEE   COMMENT)     Diagnosis Comment   The malignancy diagnosis is confirmed by review of a second   pathologist. Linda Collins result of HER2 IHC with reflex to [address] [place] [address] for   gastroesophageal adenocarcinoma will be issued in an addendum.    ADDENDUM (SCM)     Date Ordered:     2/16/2021     Status: Signed Out        Date Complete:     2/16/2021     By: Elaine Culver M.D.        Date Reported:     2/16/2021       ADDENDUM COMMENT   This addendum is issued in order to incorporate the result of HER2 by   [address] [place] [address], performed at [address] [place] [address] ([id number], 02/16/2021).       \"RESULTS:  INDETERMINATE     Interpretation:     Average HER2 signals/nucleus:  4.4   Average SUNG 17 signals/nucleus:  3.4   HER2/SUNG 17 signal ratio:  1.3   Number of Observers:  2     Even after analysis of additional cells and review of slides by a   pathologist, FISH results show a HER2/SUNG 17 ratio < 2.0 and an   average of 4-6 HER2 signals per nucleus and 3 or more SUNG 17 signals   per nucleus.  The results are INDETERMINATE.       In this situation, the 2016 CAP/ASCP/ASCO guidelines recommend   selecting a different tumor block for HER2 testing, if available. \"       Of note, there is only one block available for testing of invasive   esophageal adenocarcinoma tumor cells.  It was already used for HER2   IHC and HER2 FISH testing with the results issued in the addendums.     IMAGING DATA:    Reviewed  CT chest abdomen pelvis 10/20/2021  Impression   1.  Stable exam of the chest, abdomen and pelvis without evidence for   metastatic disease.       2.  The esophagus is decompressed.  Mucosal enhancement in the distal   esophagus may be due to underlying varices.  Underlying inflammation or mass   is considered less likely given the stability of this finding and recent   negative PET-CT.       3.  Morphologic findings of cirrhosis and portal hypertension again   demonstrated.  No focal liver lesion identified.  Trace abdominopelvic   ascites.  Small varices. ASSESSMENT:    Erma Mei is a 57 y.o. male with history of hepatitis C, liver cirrhosis, history of alcohol abuse, with esophageal cancer. I reviewed the NCCN guidelines and goals of care. Clinically appears to have T2 N0 M0  Stag II, disease. Started on preoperative  concurrent chemoradiation  Now he has completed chemoradiation  A PET scan on 7/21/21 after chemoradiation showed no metabolic evidence of active neoplasm. Induced at [place] [facility] recommended surveillance. During today's visit, the patient and the family had a number of reasonable questions which were answered to their satisfaction. They verbalized understanding of the information provided and they agreed to proceed as outlined above. PLAN:   I reviewed the treatment plan with patient and family  He has completed chemoradiation  I reviewed the results of CT scan which showed no evidence of recurrence  Patient will follow with gastroenterology and is considering possible RFA  Return to clinic in 3 months with scans prior      Garrison Jaeger MD  Hematologist/Medical Oncologist      This note is created with the assistance of a speech recognition program.  While intending to generate a document that actually reflects the content of the visit, the document can still have some errors including those of syntax and sound a like substitutions which may escape proof reading.   It such instances, actual meaning can be extrapolated by contextual diversion.

## 2021-10-29 NOTE — TELEPHONE ENCOUNTER
AVS from 10/29/21    RTc in 3 months with scan with labs  PORT flush as scheduled    RV scheduled 1/25/22 @ 2pm    Port flushes scheduled: 12/10/21 & 1/18 w/ CT    CT scheduled 1/18/21 @ 11am    PT was given AVS and an appt schedule    Electronically signed by Samson Hernandez on 10/29/2021 at 1:05 PM

## 2021-10-29 NOTE — PROGRESS NOTES
Manjit Zamora  10/29/2021  9:03 AM      Vitals:    10/29/21 0900   BP: (!) 150/90   Pulse: 90   Resp: 16   Temp: 98 °F (36.7 °C)   SpO2: 98%    :  Patient Currently in Pain: Denies             Wt Readings from Last 1 Encounters:   10/29/21 209 lb (94.8 kg)                Current Outpatient Medications:     VITAMIN D, CHOLECALCIFEROL, PO, Take by mouth Takes an OTC dose, unsure of dosage, Disp: , Rfl:     FLUoxetine (PROZAC) 20 MG capsule, Take 1 capsule by mouth daily, Disp: 30 capsule, Rfl: 3    omeprazole (PRILOSEC) 40 MG delayed release capsule, Take 1 capsule by mouth every morning (before breakfast), Disp: 30 capsule, Rfl: 2    lidocaine-prilocaine (EMLA) 2.5-2.5 % cream, Apply topically 45-60 mins prior to needle poke daily PRN, Disp: 1 Tube, Rfl: 2    ondansetron (ZOFRAN-ODT) 8 MG TBDP disintegrating tablet, Place 1 tablet under the tongue 3 times daily as needed for Nausea or Vomiting, Disp: 90 tablet, Rfl: 2    D3-50 1.25 MG (69444 UT) CAPS, once a week On Tuesdays, Disp: , Rfl:     hydrOXYzine (VISTARIL) 25 MG capsule, take 1 capsule by mouth three times a day if needed for anxiety, Disp: 90 capsule, Rfl: 2    atorvastatin (LIPITOR) 20 MG tablet, take 1 tablet by mouth at bedtime, Disp: 90 tablet, Rfl: 1    spironolactone (ALDACTONE) 50 MG tablet, take 1 tablet by mouth once daily, Disp: 90 tablet, Rfl: 3    vitamin D (ERGOCALCIFEROL) 1.25 MG (23792 UT) CAPS capsule, take 1 capsule by mouth every week, Disp: 12 capsule, Rfl: 1    Immunizations:    Influenza status:    []   Current   [x]   Patient declined    Pneumococcal status:  [x]   Current  []   Patient declined  Covid status:   [x]  Dose #1:                     [x]  Dose #2:               []   Patient declined    Smoking Status:    [] Smoker - PPD:   [x] Nonsmoker - Quit [id number]               [] Never a smoker        Hormone:  Lupron []   Last dose given:           Next dose due:   Eligard []   Last dose given:           Next dose due: staff  3. Provide assistance as indicated for ambulation activities  4. Reorient confused/cognitively impaired patient  5. Chair/bed in low position, stretcher/bed with siderails up except when performing patient care activities  6. Educate patient/family/caregiver on falls prevention         PLAN: Patient is seen today in follow up. He reports no concerns at this time Dr Meredith Starks notified and examined pt.  Pt to f/u with RO eitan Quevedo RN

## 2021-10-29 NOTE — PROGRESS NOTES
[facility]            Radiation Oncology          [address] [facility]          [address] \[facility] [place] [facility]\"" [address]        Jaiden Hartmann: [phone]        F: [phone]       [email or web address]         Date of Service: 10/29/2021     Location:  [facility]  Raghu Ho,   [address] [facility] [place]   [phone]        RADIATION ONCOLOGY FOLLOW UP NOTE    Patient ID:   Martha Fernandez  : 1960   MRN: [id number]    DIAGNOSIS:  Cancer Staging  Malignant neoplasm of lower third of esophagus ([facility] [address]  Staging form: Esophagus - Adenocarcinoma, AJCC 8th Edition  - Clinical: Stage IIB (cT2, cN0, cM0) - Signed by Jesús Vega MD on 3/18/2021    Radiation therapy with concurrent chemotherapy completed 21, not a surgical candidate due to liver disease. INTERVAL HISTORY:   Martha Fernandez is a 57 y.o.. male who presents for follow the above diagnosis and treatment street. He had PET/CT on 2021 which showed no evidence of recurrence. He was evaluated by thoracic surgery at [facility] as well as hematologist and was deemed not a good surgical candidate because of his liver failure. Surveillance was recommended. CT chest abdomen pelvis on 10/20/2021 showed a stable exam of the chest and pelvis without evidence of metastatic disease or recurrence. He is following with with medical oncology, and he sees his GI doctor in January for probably another scope. He is doing well without any new problems or complaints. He denies any belly pain, nausea or vomiting, problems with eating, new lumps or bumps, chest pain, cough, hemoptysis, or involuntary weight loss.     MEDICATIONS:    Current Outpatient Medications:     VITAMIN D, CHOLECALCIFEROL, PO, Take by mouth Takes an OTC dose, unsure of dosage, Disp: , Rfl:     FLUoxetine (PROZAC) 20 MG capsule, Take 1 capsule by mouth daily, Disp: 30 capsule, Rfl: 3    omeprazole (PRILOSEC) 40 MG delayed release capsule, Take 1 capsule by mouth every morning (before breakfast), Disp: 30 capsule, Rfl: 2    lidocaine-prilocaine (EMLA) 2.5-2.5 % cream, Apply topically 45-60 mins prior to needle poke daily PRN, Disp: 1 Tube, Rfl: 2    ondansetron (ZOFRAN-ODT) 8 MG TBDP disintegrating tablet, Place 1 tablet under the tongue 3 times daily as needed for Nausea or Vomiting, Disp: 90 tablet, Rfl: 2    D3-50 1.25 MG (87133 UT) CAPS, once a week On , Disp: , Rfl:     hydrOXYzine (VISTARIL) 25 MG capsule, take 1 capsule by mouth three times a day if needed for anxiety, Disp: 90 capsule, Rfl: 2    atorvastatin (LIPITOR) 20 MG tablet, take 1 tablet by mouth at bedtime, Disp: 90 tablet, Rfl: 1    spironolactone (ALDACTONE) 50 MG tablet, take 1 tablet by mouth once daily, Disp: 90 tablet, Rfl: 3    vitamin D (ERGOCALCIFEROL) 1.25 MG (56071 UT) CAPS capsule, take 1 capsule by mouth every week, Disp: 12 capsule, Rfl: 1    ALLERGIES:  Allergies   Allergen Reactions    Pollen Extract      Runny nose, watery eyes         REVIEW OF SYSTEMS:    A full 14 point review of systems was performed and assessed and found to be negative except as noted above. PHYSICAL EXAMINATION:    CHAPERONE: Not Required    ECO    VITAL SIGNS: BP (!) 150/90   Pulse 90   Temp 98 °F (36.7 °C)   Resp 16   Wt 209 lb (94.8 kg)   SpO2 98%   BMI 29.99 kg/m²   GENERAL:  General appearance is that of a well-nourished, well-developed in no apparent distress. HEENT: Normocephalic, atraumatic, EOMI, moist mucosa, no erythema. Indirect exam .  NECK:  No adenopathy or a palpable thyroid mass, trachea is midline. LYMPHATICS: No cervical, supraclavicular, or axillary adenopathy. HEART:  Normal rate and regular rhythm, normal heart sounds. LUNGS:  Pulmonary effort normal. Normal breath sounds. ABDOMEN:  Soft, nontender, non distended, and no hepatosplenomegaly. EXTREMITIES:  No edema. No calf tenderness. MSK:  No spinal tenderness.  Normal ROM.  NEUROLOGICAL: Alert and oriented. Strength and sensation intact bilaterally. No focal deficits. PSYCH: Mood normal, behavior normal.  SKIN: No erythema, no desquamation. RECTAL: Deferred   GYN: Deferred   BREAST: Deferred       LABS:  WBC   Date Value Ref Range Status   09/21/2021 4.8 3.5 - 11.0 k/uL Final     Segs Absolute   Date Value Ref Range Status   08/19/2021 4.50 1.8 - 7.7 k/uL Final   08/19/2021 4.50 1.8 - 7.7 k/uL Final     Hemoglobin   Date Value Ref Range Status   09/21/2021 12.4 (L) 13.5 - 17.5 g/dL Final     Platelet Count   Date Value Ref Range Status   04/19/2012 308 140 - 450 k/uL Final     Platelets   Date Value Ref Range Status   09/21/2021 105 (L) 150 - 450 k/uL Final     No results found for: , CEA  PSA   Date Value Ref Range Status   08/19/2021 0.11 <4.1 ug/L Final     Comment:     The Roche \"ECLIA\" assay is used. Results obtained with different assay methods cannot be   used interchangeably. 03/23/2017 0.44 <4.1 ug/L Final     Comment:     The Roche \"ECLIA\" assay is used. Results obtained with different assay methods   cannot be used interchangeably. Performed at [facility] [address], [address] [place] [address] [phone]     04/19/2012 0.54 0 - 4 ug/L Final     Comment:     Performed at [facility] [address], [place] [address] [phone]       IMAGING:   PET/CT and CT done in October reviewed and noted above. ASSESSMENT AND PLAN:  Milan Vogel is a 57 y.o. male with a history of early stage lower esophageal cancer status post definitive chemoradiation completed in June of this year who has no clinical radiographic evidence of disease presents for follow-up. He is doing very well and is following closely with GI and medical oncology. He would like to consolidate some of his follow-ups if he can. I told him is fine and we will consolidate his follow-up today with [facility]. He will see them for future exams and surveillance.   He knows to call anytime with any questions or concerns that may arise in interim I will see him back on as-needed basis. Patient was in agreement with my recommendations. All questions were answered to their satisfaction. Patient was advised to contact us anytime should they have any questions or concerns. Time spent 35 minutes with 50% face-to-face with the patient    Electronically signed by Sapphire Curtis MD on 10/29/2021 at 9:30 AM        Medications Prescribed:   New Prescriptions    No medications on file       Orders: No orders of the defined types were placed in this encounter.       CC:  Patient Care Team:  Alvarez Rangel as PCP - General (Physician Assistant)  VASU Rangel as PCP - [address] [place] [address] Ceci Provider  Tacos Mars MD as Consulting Physician (Pain Management)  Any Altamirano MD as Consulting Physician (Gastroenterology)  Lauren Stockton RN as Nurse Navigator (Oncology)  Niraj Mai MD as Resident

## 2021-11-03 ENCOUNTER — TELEPHONE (OUTPATIENT)
Dept: ONCOLOGY | Age: 61
End: 2021-11-03

## 2021-11-03 NOTE — TELEPHONE ENCOUNTER
Name: Yan Kendall  : 1960  MRN: [id number]    Oncology Navigation Follow-Up Note    Contact Type:  Telephone    Notes: Writer called patients ken Jay to touch base with them and pt next step. Pierre states that the pt is doing really good and denies any issues at all. She states they see Dr. Rito Garcia in late January and at this point pt will stay on active surveillance with PORT flushes. Writer informed her that I would stay on for navigation and encouraged her to reach out to me for any needs, if not, I'd f/u with them in January. Pierre was appreciative of call.     Electronically signed by Doug Fuller RN on 11/3/2021 at 1:56 PM

## 2021-11-12 DIAGNOSIS — F32.A DEPRESSION, UNSPECIFIED DEPRESSION TYPE: ICD-10-CM

## 2021-11-12 RX ORDER — FLUOXETINE HYDROCHLORIDE 20 MG/1
CAPSULE ORAL
Qty: 30 CAPSULE | Refills: 3 | Status: ON HOLD | OUTPATIENT
Start: 2021-11-12 | End: 2022-04-21 | Stop reason: HOSPADM

## 2021-11-12 RX ORDER — OMEPRAZOLE 40 MG/1
CAPSULE, DELAYED RELEASE ORAL
Qty: 30 CAPSULE | Refills: 2 | Status: ON HOLD | OUTPATIENT
Start: 2021-11-12 | End: 2022-06-09 | Stop reason: HOSPADM

## 2021-11-30 ENCOUNTER — OFFICE VISIT (OUTPATIENT)
Dept: PAIN MANAGEMENT | Age: 61
End: 2021-11-30
Payer: MEDICARE

## 2021-11-30 VITALS
DIASTOLIC BLOOD PRESSURE: 57 MMHG | OXYGEN SATURATION: 98 % | BODY MASS INDEX: 30.06 KG/M2 | SYSTOLIC BLOOD PRESSURE: 105 MMHG | HEIGHT: 70 IN | WEIGHT: 210 LBS | HEART RATE: 88 BPM

## 2021-11-30 DIAGNOSIS — G95.19 NEUROGENIC CLAUDICATION (HCC): ICD-10-CM

## 2021-11-30 DIAGNOSIS — M54.2 CHRONIC NECK PAIN: ICD-10-CM

## 2021-11-30 DIAGNOSIS — R20.2 BILATERAL NUMBNESS AND TINGLING OF ARMS AND LEGS: ICD-10-CM

## 2021-11-30 DIAGNOSIS — G89.29 CHRONIC NECK PAIN: ICD-10-CM

## 2021-11-30 DIAGNOSIS — R26.81 GAIT INSTABILITY: Primary | ICD-10-CM

## 2021-11-30 DIAGNOSIS — R69 SEVERE COMORBID ILLNESS: ICD-10-CM

## 2021-11-30 DIAGNOSIS — R20.0 BILATERAL NUMBNESS AND TINGLING OF ARMS AND LEGS: ICD-10-CM

## 2021-11-30 PROCEDURE — 99214 OFFICE O/P EST MOD 30 MIN: CPT | Performed by: ANESTHESIOLOGY

## 2021-11-30 RX ORDER — HYDROCODONE BITARTRATE AND ACETAMINOPHEN 5; 325 MG/1; MG/1
1 TABLET ORAL DAILY PRN
Qty: 14 TABLET | Refills: 0 | Status: SHIPPED | OUTPATIENT
Start: 2021-11-30 | End: 2021-12-14

## 2021-11-30 ASSESSMENT — ENCOUNTER SYMPTOMS
BACK PAIN: 1
EYES NEGATIVE: 1
ALLERGIC/IMMUNOLOGIC NEGATIVE: 1

## 2021-11-30 NOTE — PROGRESS NOTES
The patient is a 57 y. o. Non- / non  male.     Chief Complaint   Patient presents with    Back Pain    Neck Pain        HPI     Chronic neck pain onset more than 1 year ago located over the cervical spine with radiation down both arm associated with arm numbness and tingling  No previous cervical spine surgical history    Back pain  Located in the lower lumbar area across midline aggravated with standing and walking  Radiates down both legs associated with leg numbness and tingling  Report progressive worsening of gait and balance  No previous lumbar spine surgical history    Last diagnostic work-up was MRI lumbar spine more than 5 years ago  Pain is severe progressively worsening affecting quality of life balance and progressive leg weakness and arm weakness  I am concerned for spinal cord compression  Will obtain cervical and lumbar spine MRI to rule out cervical or lumbar myelopathy or cauda equina  We will consider for appropriate interventional procedure after diagnostic imaging  Pt here today for back and neck pain      Past Medical History:   Diagnosis Date    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 Shellie Borjas, orthopedic, every 3-4 months, gets steroid injection    Lezama esophagus     BPH (benign prostatic hypertrophy)     Cholelithiasis     Cirrhosis ([address]     COVID-19 12/2020    pt reports he had a positive test while at [facility] in 2020, was asymptomatic    COVID-19 vaccine series completed 5/20/2021, 6/22/2021    Moderna 5/20/2021, 6/22/2021    DDD (degenerative disc disease), lumbar     Depression     Esophageal cancer ([address]     INVASIVE ADENOCARCINOMA ARISING IN TUBULAR ADENOMA WITH HIGH GRADE DYSPLASIA, ASSOCIATED WITH FOCAL INTESTINAL METAPLASIA     Esophageal varices ([address]     Fatty liver     GERD (gastroesophageal reflux disease)     Hep C w/o coma, chronic ([address]     History of alcohol abuse     6-12 beers a day; quit drinking July 2016    History of blood transfusion     History of colon polyps 2016    History of tobacco abuse     [place] (hard of hearing)     Hyperlipidemia     Hypertension     Port-A-Cath in place     right upper chest    Stomach ulcer     hx of    Tubular adenoma of colon 2016, 2018    Vitamin D deficiency     Wears glasses         Past Surgical History:   Procedure Laterality Date    BUNIONECTOMY      twice on right side    BUNIONECTOMY Left     CARPAL TUNNEL RELEASE Right     COLONOSCOPY      at age 35    COLONOSCOPY  10/05/2016    polyps-pathology tubular adenoma, and abnormal looking mucosa right colon-pathology-tubular adenoma    COLONOSCOPY N/A 3/30/2018    COLONOSCOPY POLYPECTOMY COLD BIOPSY performed by Darien Plascencia MD at [address] [place] [address]  03/30/2018    Small polyp in the sigmoid colon and excised with biopsy forceps--tubular adenoma    ENDOSCOPY, COLON, DIAGNOSTIC      EGD    IR PORT PLACEMENT EQUAL OR GREATER THAN 5 YEARS  4/19/2021    IR PORT PLACEMENT EQUAL OR GREATER THAN 5 YEARS 4/19/2021 [facility] SPECIAL PROCEDURES    KNEE SURGERY Left     cyst removed    NASAL SEPTUM SURGERY      NERVE BLOCK Right 11/23/2020    NERVE BLOCK RIGHT CERVICAL STEROID INJECTION  C3-C6 performed by Lindbergh Dakin, MD at [facility]  01/04/16    steroid injection C7 T1    OTHER SURGICAL HISTORY  11/21/2016    Bilateral Lumbar CACHORRO L4-L5 injections    OTHER SURGICAL HISTORY  12/19/2016    lumbar steroid injection    OTHER SURGICAL HISTORY  09/28/2018    BILATERAL L5 CACHORRO (N/A Back)    OTHER SURGICAL HISTORY Right 11/23/2020    cervical injection    PAIN MANAGEMENT PROCEDURE Left 7/9/2020    EPIDURAL STEROID INJECTION LEFT L4 L5 performed by Lindbergh Dakin, MD at [facility] Left 7/20/2020    LEFT L4 L5 EPIDURAL STEROID INJECTION performed by Lindbergh Dakin, MD at [address] [place] [address] Bilateral 8/17/2020    LUMBAR FACET BILATERAL L2-L5 performed by Rosa Beal MD at [facility] [address] Bilateral 2020    NERVE BLOCK BILATERAL LUMBAR MEDIAL BRANCH L2-L5 performed by Rosa Beal MD at [address] AA&/STRD TFRML EPI LUMBAR/SACRAL 1 LEVEL Bilateral 2018    BILATERAL L5 CACHORRO performed by Rosa Beal MD at [address] AA&/STRD TFRML EPI LUMBAR/SACRAL 1 LEVEL N/A 2018    BILATERAL L5 CACHORRO performed by Rosa Beal MD at [address] N/CARPAL TUNNEL SURG Right 2017    CARPAL TUNNEL RELEASE RIGHT performed by Opal Mota MD at [address] N/CARPAL TUNNEL SURG Left 10/31/2017    CARPAL TUNNEL RELEASE performed by Opal Mota MD at [address] [place] [address] 2020    EGD BIOPSY performed by Cassius Sutton MD at [address] [place] [address] 2021    EGD BIOPSY and spot marking performed by Munira Kay MD at [address] [place] [address] N/A 2021    ENDOSCOPIC ULTRASOUND, EGD performed by Echo Marrero MD at [address] [place] [address]  2021    EGD DIAGNOSTIC ONLY performed by Echo Marrero MD at [address] [place] [address] N/A 2021    EGD BIOPSY performed by Munira Kay MD at [facility] History     Socioeconomic History    Marital status: Single     Spouse name: None    Number of children: None    Years of education: None    Highest education level: None   Occupational History    None   Tobacco Use    Smoking status: Former Smoker     Packs/day: 1.00     Years: 45.00     Pack years: 45.00     Quit date: 2017     Years since quittin.8    Smokeless tobacco: Never Used   Vaping Use    Vaping Use: Never used   Substance and Sexual Activity    Alcohol use: Not Currently     Comment: quit 2019    Drug use: Not Currently     Frequency: 1.0 times per week     Comment: cocaine,  stopped spring 2016    Sexual activity: Yes     Partners: Female   Other Topics Concern    None   Social History Narrative     in the past, retired     Social Determinants of Health     Financial Resource Strain: Low Risk     Difficulty of Paying Living Expenses: Not hard at all   Food Insecurity: No Food Insecurity    Worried About [address] in the Last Year: Never true   [address] in the Last Year: Never true   Transportation Needs: No Transportation Needs    Lack of Transportation (Medical): No    Lack of Transportation (Non-Medical):  No   Physical Activity:     Days of Exercise per Week: Not on file    Minutes of Exercise per Session: Not on file   Stress:     Feeling of Stress : Not on file   Social Connections:     Frequency of Communication with Friends and Family: Not on file    Frequency of Social Gatherings with Friends and Family: Not on file    Attends [employer] Services: Not on file    Active Member of [address] [place] [address] or Organizations: Not on file    Attends Club or Organization Meetings: Not on file    Marital Status: Not on file   Intimate Partner Violence:     Fear of Current or Ex-Partner: Not on file    Emotionally Abused: Not on file    Physically Abused: Not on file    Sexually Abused: Not on file   Housing Stability:     Unable to Pay for Housing in the Last Year: Not on file    Number of [place] in the Last Year: Not on file    Unstable Housing in the Last Year: Not on file       Family History   Problem Relation Age of Onset    Cancer Mother         pancreatic    Cancer Father         bone    Diabetes Sister     Asthma Brother        Allergies   Allergen Reactions    Bee Pollen Other (See Comments)     Runny nose, watery eyes    Pollen Extract      Runny nose, watery eyes       Vitals:    11/30/21 0910   BP: (!) 105/57   Pulse: 88   SpO2: 98%       Current Outpatient Medications   Medication Sig Dispense Refill    omeprazole (PRILOSEC) 40 MG delayed release capsule take 1 capsule by mouth EVERY MORNING BEFORE BREAKFAST 30 capsule 2    FLUoxetine (PROZAC) 20 MG capsule take 1 capsule by mouth once daily 30 capsule 3    VITAMIN D, CHOLECALCIFEROL, PO Take by mouth Takes an OTC dose, unsure of dosage      lidocaine-prilocaine (EMLA) 2.5-2.5 % cream Apply topically 45-60 mins prior to needle poke daily PRN 1 Tube 2    ondansetron (ZOFRAN-ODT) 8 MG TBDP disintegrating tablet Place 1 tablet under the tongue 3 times daily as needed for Nausea or Vomiting 90 tablet 2    D3-50 1.25 MG (06528 UT) CAPS once a week On Tuesdays      hydrOXYzine (VISTARIL) 25 MG capsule take 1 capsule by mouth three times a day if needed for anxiety 90 capsule 2    atorvastatin (LIPITOR) 20 MG tablet take 1 tablet by mouth at bedtime 90 tablet 1    spironolactone (ALDACTONE) 50 MG tablet take 1 tablet by mouth once daily 90 tablet 3    vitamin D (ERGOCALCIFEROL) 1.25 MG (32218 UT) CAPS capsule take 1 capsule by mouth every week 12 capsule 1     No current facility-administered medications for this visit. Review of Systems   Constitutional: Negative. Negative for fever. Eyes: Negative. Endocrine: Negative. Musculoskeletal: Positive for back pain and neck pain. Allergic/Immunologic: Negative. Objective:  General Appearance:  Uncomfortable, in pain, well-appearing and in no acute distress. Vital signs: (most recent): Blood pressure (!) 105/57, pulse 88, height 5' 10\" (1.778 m), weight 210 lb (95.3 kg), SpO2 98 %. Vital signs are normal.  No fever. Output: Producing urine and producing stool. HEENT: Normal HEENT exam.    Lungs:  Normal effort and normal respiratory rate. Breath sounds clear to auscultation. He is not in respiratory distress. No decreased breath sounds. Heart: Normal rate. Regular rhythm. Extremities: Normal range of motion. There is no deformity.     Neurological: Patient is alert and oriented to person, place and time. Normal strength. Patient has normal coordination. Pupils:  Pupils are equal, round, and reactive to light. Pupils are equal.   Skin:  Warm and dry. No rash or cyanosis. Assessment & Plan       1. Neurogenic claudication (HCC)    2. Gait instability    3. Severe comorbid illness    4. Chronic neck pain    5. Bilateral numbness and tingling of arms and legs        Orders Placed This Encounter   Procedures    MRI LUMBAR SPINE WO CONTRAST    MRI CERVICAL SPINE WO CONTRAST      No orders of the defined types were placed in this encounter.            Electronically signed by Alejandrina Chin MD on 11/30/2021 at 9:29 AM

## 2021-12-07 ENCOUNTER — HOSPITAL ENCOUNTER (OUTPATIENT)
Dept: MRI IMAGING | Age: 61
Discharge: HOME | End: 2021-12-09
Payer: MEDICARE

## 2021-12-07 DIAGNOSIS — G89.29 CHRONIC NECK PAIN: ICD-10-CM

## 2021-12-07 DIAGNOSIS — M54.2 CHRONIC NECK PAIN: ICD-10-CM

## 2021-12-07 DIAGNOSIS — R20.2 BILATERAL NUMBNESS AND TINGLING OF ARMS AND LEGS: ICD-10-CM

## 2021-12-07 DIAGNOSIS — R20.0 BILATERAL NUMBNESS AND TINGLING OF ARMS AND LEGS: ICD-10-CM

## 2021-12-07 DIAGNOSIS — G95.19 NEUROGENIC CLAUDICATION (HCC): ICD-10-CM

## 2021-12-07 DIAGNOSIS — R26.81 GAIT INSTABILITY: ICD-10-CM

## 2021-12-07 PROCEDURE — 72148 MRI LUMBAR SPINE W/O DYE: CPT

## 2021-12-07 PROCEDURE — 72141 MRI NECK SPINE W/O DYE: CPT

## 2021-12-10 ENCOUNTER — HOSPITAL ENCOUNTER (OUTPATIENT)
Dept: INFUSION THERAPY | Age: 61
Discharge: HOME | End: 2021-12-10
Payer: MEDICARE

## 2021-12-10 DIAGNOSIS — C15.5 MALIGNANT NEOPLASM OF LOWER THIRD OF ESOPHAGUS (HCC): Primary | ICD-10-CM

## 2021-12-10 PROCEDURE — 96523 IRRIG DRUG DELIVERY DEVICE: CPT

## 2021-12-10 PROCEDURE — 6360000002 HC RX W HCPCS: Performed by: INTERNAL MEDICINE

## 2021-12-10 PROCEDURE — 2580000003 HC RX 258: Performed by: INTERNAL MEDICINE

## 2021-12-10 RX ORDER — SODIUM CHLORIDE 0.9 % (FLUSH) 0.9 %
5-40 SYRINGE (ML) INJECTION PRN
Status: CANCELLED | OUTPATIENT
Start: 2021-12-10

## 2021-12-10 RX ORDER — HEPARIN SODIUM (PORCINE) LOCK FLUSH IV SOLN 100 UNIT/ML 100 UNIT/ML
500 SOLUTION INTRAVENOUS PRN
Status: DISCONTINUED | OUTPATIENT
Start: 2021-12-10 | End: 2021-12-11 | Stop reason: HOSPADM

## 2021-12-10 RX ORDER — SODIUM CHLORIDE 9 MG/ML
25 INJECTION, SOLUTION INTRAVENOUS PRN
OUTPATIENT
Start: 2021-12-10

## 2021-12-10 RX ORDER — SODIUM CHLORIDE 0.9 % (FLUSH) 0.9 %
5-40 SYRINGE (ML) INJECTION PRN
Status: DISCONTINUED | OUTPATIENT
Start: 2021-12-10 | End: 2021-12-11 | Stop reason: HOSPADM

## 2021-12-10 RX ORDER — HEPARIN SODIUM (PORCINE) LOCK FLUSH IV SOLN 100 UNIT/ML 100 UNIT/ML
500 SOLUTION INTRAVENOUS PRN
Status: CANCELLED | OUTPATIENT
Start: 2021-12-10

## 2021-12-10 RX ADMIN — HEPARIN 500 UNITS: 100 SYRINGE at 13:37

## 2021-12-10 RX ADMIN — SODIUM CHLORIDE, PRESERVATIVE FREE 10 ML: 5 INJECTION INTRAVENOUS at 13:36

## 2021-12-10 RX ADMIN — SODIUM CHLORIDE, PRESERVATIVE FREE 10 ML: 5 INJECTION INTRAVENOUS at 13:37

## 2021-12-14 ENCOUNTER — OFFICE VISIT (OUTPATIENT)
Dept: PAIN MANAGEMENT | Age: 61
End: 2021-12-14
Payer: MEDICARE

## 2021-12-14 VITALS
HEART RATE: 97 BPM | SYSTOLIC BLOOD PRESSURE: 139 MMHG | OXYGEN SATURATION: 100 % | BODY MASS INDEX: 30.06 KG/M2 | DIASTOLIC BLOOD PRESSURE: 70 MMHG | HEIGHT: 70 IN | WEIGHT: 210 LBS

## 2021-12-14 DIAGNOSIS — R69 SEVERE COMORBID ILLNESS: ICD-10-CM

## 2021-12-14 DIAGNOSIS — R93.7 ABNORMAL FINDINGS ON DIAGNOSTIC IMAGING OF SPINE: ICD-10-CM

## 2021-12-14 DIAGNOSIS — M47.816 LUMBAR FACET ARTHROPATHY: ICD-10-CM

## 2021-12-14 DIAGNOSIS — M54.51 VERTEBROGENIC LOW BACK PAIN: ICD-10-CM

## 2021-12-14 DIAGNOSIS — M48.02 CERVICAL SPINAL STENOSIS: ICD-10-CM

## 2021-12-14 DIAGNOSIS — M48.062 SPINAL STENOSIS OF LUMBAR REGION WITH NEUROGENIC CLAUDICATION: Primary | ICD-10-CM

## 2021-12-14 PROCEDURE — 99214 OFFICE O/P EST MOD 30 MIN: CPT | Performed by: ANESTHESIOLOGY

## 2021-12-14 RX ORDER — OXYCODONE HYDROCHLORIDE AND ACETAMINOPHEN 5; 325 MG/1; MG/1
1 TABLET ORAL DAILY PRN
Qty: 30 TABLET | Refills: 0 | Status: ON HOLD | OUTPATIENT
Start: 2021-12-14 | End: 2021-12-23 | Stop reason: SDUPTHER

## 2021-12-14 ASSESSMENT — ENCOUNTER SYMPTOMS
EYES NEGATIVE: 1
BACK PAIN: 1

## 2021-12-14 NOTE — PROGRESS NOTES
The patient is a 57 y. o. Non- / non  male.     Chief Complaint   Patient presents with    Results     MRI        HPI     69-year-old man with history of chronic back pain and neck pain  Index pain is back pain    Neck pain  Pain is chronic onset more than 1 year ago located over the cervical spine radiates down both arms aggravated with neck movement associated with arm numbness and tingling  Of bladder about bowel control but noticing progressive weakness in leg    Back pain  Chronic onset more than 1 year ago located in the lower lumbar area across midline affect both side  Radiates down both legs aggravated standing and walking and alleviates with rest  He could barely stand and walk for a few seconds before he has to sit down and rest    Patient of tried physical therapy  He is currently taking Norco do not find it much helpful denies any side effect  No history of illicit substance use    I completed MRI cervical and lumbar spine and is here for review of the imaging and discuss further treatment option    He had an epidural injection in August which he did find helpful with more than 50% relief for about 3 months  Requesting to schedule for a repeat epidural injection  pt here today to discuss MRI results        Past Medical History:   Diagnosis Date    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 Yimi Barrios, orthopedic, every 3-4 months, gets steroid injection    Lezama esophagus     BPH (benign prostatic hypertrophy)     Cholelithiasis     Cirrhosis ([address]     COVID-19 12/2020    pt reports he had a positive test while at [facility] in 2020, was asymptomatic    COVID-19 vaccine series completed 5/20/2021, 6/22/2021    Moderna 5/20/2021, 6/22/2021    DDD (degenerative disc disease), lumbar     Depression     Esophageal cancer ([address]     INVASIVE ADENOCARCINOMA ARISING IN TUBULAR ADENOMA WITH HIGH GRADE DYSPLASIA, ASSOCIATED WITH FOCAL INTESTINAL METAPLASIA     Esophageal varices (HCC)     Fatty liver     GERD (gastroesophageal reflux disease)     Hep C w/o coma, chronic (HCC)     History of alcohol abuse     6-12 beers a day; quit drinking July 2016    History of blood transfusion     History of colon polyps 2016    History of tobacco abuse     [place] (hard of hearing)     Hyperlipidemia     Hypertension     Port-A-Cath in place     right upper chest    Stomach ulcer     hx of    Tubular adenoma of colon 2016, 2018    Vitamin D deficiency     Wears glasses         Past Surgical History:   Procedure Laterality Date    BUNIONECTOMY      twice on right side    BUNIONECTOMY Left     CARPAL TUNNEL RELEASE Right     COLONOSCOPY      at age 35    COLONOSCOPY  10/05/2016    polyps-pathology tubular adenoma, and abnormal looking mucosa right colon-pathology-tubular adenoma    COLONOSCOPY N/A 3/30/2018    COLONOSCOPY POLYPECTOMY COLD BIOPSY performed by Michele Miranda MD at [place] [address]  03/30/2018    Small polyp in the sigmoid colon and excised with biopsy forceps--tubular adenoma    ENDOSCOPY, COLON, DIAGNOSTIC      EGD    IR PORT PLACEMENT EQUAL OR GREATER THAN 5 YEARS  4/19/2021    IR PORT PLACEMENT EQUAL OR GREATER THAN 5 YEARS 4/19/2021 [facility] PROCED[facility]    KNEE SURGERY Left     cyst removed    NASAL SEPTUM SURGERY      NERVE BLOCK Right 11/23/2020    NERVE BLOCK RIGHT CERVICAL STEROID INJECTION  C3-C6 performed by Abraham Murry MD at [address]  01/04/16    steroid injection C7 T1    OTHER SURGICAL HISTORY  11/21/2016    Bilateral Lumbar CACHORRO L4-L5 injections    OTHER SURGICAL HISTORY  12/19/2016    lumbar steroid injection    OTHER SURGICAL HISTORY  09/28/2018    BILATERAL L5 CACHORRO (N/A Back)    OTHER SURGICAL HISTORY Right 11/23/2020    cervical injection    PAIN MANAGEMENT PROCEDURE Left 7/9/2020    EPIDURAL STEROID INJECTION LEFT L4 L5 performed by Abraham Murry MD at [facility]    PAIN MANAGEMENT PROCEDURE Left 2020    LEFT L4 L5 EPIDURAL STEROID INJECTION performed by Michelle Lyons MD at [facility] Bilateral 2020    LUMBAR FACET BILATERAL L2-L5 performed by Michelle Lyons MD at [facility] Bilateral 2020    NERVE BLOCK BILATERAL LUMBAR MEDIAL BRANCH L2-L5 performed by Michelle Lyons MD at [address] AA&/STRD TFRML EPI LUMBAR/SACRAL 1 LEVEL Bilateral 2018    BILATERAL L5 CACHORRO performed by Michelle Lyons MD at [address] AA&/STRD TFRML EPI LUMBAR/SACRAL 1 LEVEL N/A 2018    BILATERAL L5 CACHORRO performed by Michelle Lyons MD at [facility] N/CARPAL TUNNEL SURG Right 2017    CARPAL TUNNEL RELEASE RIGHT performed by Yancy Arnold MD at [facility] N/CARPAL TUNNEL SURG Left 10/31/2017    CARPAL TUNNEL RELEASE performed by Yancy Arnold MD at [facility] 2020    EGD BIOPSY performed by Melony Arnold MD at [place] [address] N/A 2021    EGD BIOPSY and spot marking performed by Irish Perez MD at [place] [address] N/A 2021    ENDOSCOPIC ULTRASOUND, EGD performed by Yusra Fulton MD at [address] [place] [address]  2021    EGD DIAGNOSTIC ONLY performed by Yusra Fulton MD at [address] [place] [address] N/A 2021    EGD BIOPSY performed by Irish Perez MD at [facility] History     Socioeconomic History    Marital status: Single     Spouse name: None    Number of children: None    Years of education: None    Highest education level: None   Occupational History    None   Tobacco Use    Smoking status: Former Smoker     Packs/day: 1.00     Years: 45.00     Pack years: 45.00     Quit date: 2017     Years since quittin.9    Smokeless tobacco: Never Used   Vaping Use    Vaping Use: Never used   Substance and Sexual Activity    Alcohol use: Not Currently     Comment: quit 2019    Drug use: Not Currently     Frequency: 1.0 times per week     Comment: cocaine,  stopped spring 2016    Sexual activity: Yes     Partners: Female   Other Topics Concern    None   Social History Narrative     in the past, retired     Social Determinants of Health     Financial Resource Strain: [address] Difficulty of Paying Living Expenses: Not hard at all   Food Insecurity: No Food Insecurity    Worried About [facility] in the Last Year: Never true   [address] in the Last Year: Never true   Transportation Needs: No Transportation Needs    Lack of Transportation (Medical): No    Lack of Transportation (Non-Medical):  No   Physical Activity:     Days of Exercise per Week: Not on file    Minutes of Exercise per Session: Not on file   Stress:     Feeling of Stress : Not on file   Social Connections:     Frequency of Communication with Friends and Family: Not on file    Frequency of Social Gatherings with Friends and Family: Not on file    Attends Mu-ism Services: Not on file    Active Member of [address] [place] [address] or Organizations: Not on file    Attends Club or Organization Meetings: Not on file    Marital Status: Not on file   Intimate Partner Violence:     Fear of Current or Ex-Partner: Not on file    Emotionally Abused: Not on file    Physically Abused: Not on file    Sexually Abused: Not on file   Housing Stability:     Unable to Pay for Housing in the Last Year: Not on file    Number of [place] in the Last Year: Not on file    Unstable Housing in the Last Year: Not on file       Family History   Problem Relation Age of Onset    Cancer Mother         pancreatic    Cancer Father         bone    Diabetes Sister     Asthma Brother        Allergies   Allergen Reactions    Bee Pollen Other (See Comments) Runny nose, watery eyes    Pollen Extract      Runny nose, watery eyes       Vitals:    12/14/21 1321   BP: 139/70   Pulse: 97   SpO2: 100%       Current Outpatient Medications   Medication Sig Dispense Refill    oxyCODONE-acetaminophen (PERCOCET) 5-325 MG per tablet Take 1 tablet by mouth daily as needed for Pain for up to 30 days. 30 tablet 0    Handicap Placard MISC by Does not apply route 1 each 0    HYDROcodone-acetaminophen (NORCO) 5-325 MG per tablet Take 1 tablet by mouth daily as needed for Pain for up to 14 days. 14 tablet 0    omeprazole (PRILOSEC) 40 MG delayed release capsule take 1 capsule by mouth EVERY MORNING BEFORE BREAKFAST 30 capsule 2    FLUoxetine (PROZAC) 20 MG capsule take 1 capsule by mouth once daily 30 capsule 3    VITAMIN D, CHOLECALCIFEROL, PO Take by mouth Takes an OTC dose, unsure of dosage      lidocaine-prilocaine (EMLA) 2.5-2.5 % cream Apply topically 45-60 mins prior to needle poke daily PRN 1 Tube 2    ondansetron (ZOFRAN-ODT) 8 MG TBDP disintegrating tablet Place 1 tablet under the tongue 3 times daily as needed for Nausea or Vomiting 90 tablet 2    D3-50 1.25 MG (70159 UT) CAPS once a week On Tuesdays      hydrOXYzine (VISTARIL) 25 MG capsule take 1 capsule by mouth three times a day if needed for anxiety 90 capsule 2    atorvastatin (LIPITOR) 20 MG tablet take 1 tablet by mouth at bedtime 90 tablet 1    spironolactone (ALDACTONE) 50 MG tablet take 1 tablet by mouth once daily 90 tablet 3    vitamin D (ERGOCALCIFEROL) 1.25 MG (62348 UT) CAPS capsule take 1 capsule by mouth every week 12 capsule 1     No current facility-administered medications for this visit. Review of Systems   Constitutional: Negative. Negative for fever. Eyes: Negative. Endocrine: Negative. Musculoskeletal: Positive for back pain. Objective:  General Appearance:  Well-appearing and in no acute distress.     Vital signs: (most recent): Blood pressure 139/70, pulse 97, height 5' 10\" (1.778 m), weight 210 lb (95.3 kg), SpO2 100 %. Vital signs are normal.  No fever. Output: Producing urine and producing stool. HEENT: Normal HEENT exam.    Lungs:  Normal effort and normal respiratory rate. Breath sounds clear to auscultation. He is not in respiratory distress. Heart: Normal rate. Extremities: Normal range of motion. There is no deformity. Neurological: Patient is alert and oriented to person, place and time. Patient has normal coordination. Pupils:  Pupils are equal, round, and reactive to light. Pupils are equal.   Skin:  Warm and dry. No rash or cyanosis. Assessment & Plan   69-year-old man with history of chronic back pain and neck pain  Index pain is back pain    Neck pain  Pain is chronic onset more than 1 year ago located over the cervical spine radiates down both arms aggravated with neck movement associated with arm numbness and tingling  Of bladder about bowel control but noticing progressive weakness in leg    Back pain  Chronic onset more than 1 year ago located in the lower lumbar area across midline affect both side  Radiates down both legs aggravated standing and walking and alleviates with rest  He could barely stand and walk for a few seconds before he has to sit down and rest    Patient of tried physical therapy  He is currently taking Norco do not find it much helpful denies any side effect  No history of illicit substance use    EXAMINATION: MRI OF THE CERVICAL SPINE WITHOUT CONTRAST 12/7/2021 7:36 am    Multilevel intervertebral disc space narrowing, worse at C5-C6 and C6-C7 where there is associated Modic type 2 endplate changes. No suspicious marrow edema. C5-C6: Disc osteophyte complex. Ligamentum flavum thickening. Uncovertebral hypertrophy. Motion degraded axial image. Suspected moderate thecal sac stenosis and severe bilateral foraminal narrowing. C6-C7: Disc osteophyte complex. Ligamentum flavum thickening. Uncovertebral hypertrophy. Motion degraded axial image. Suspected moderate thecal sac stenosis and severe bilateral foraminal narrowing. EXAMINATION: MRI OF THE LUMBAR SPINE WITHOUT CONTRAST, 12/7/2021 7:35 am    Multilevel intervertebral disc space narrowing, worse at L1-L2, L3-L4, and L4-L5 where there is associated vacuum disc phenomenon and Modic type 2 endplate changes. L3-L4: Disc osteophyte complex. Moderate thecal sac stenosis. Moderate right and mild/moderate left foraminal narrowing. Significant interval progression. L4-L5: Uncovering the disc with superimposed disc osteophyte complex eccentric to the left. Facet hypertrophy. No significant thecal sac stenosis. Moderate right and severe left foraminal narrowing       1. Spinal stenosis of lumbar region with neurogenic claudication    2. Severe comorbid illness    3. Abnormal findings on diagnostic imaging of spine    4. Cervical spinal stenosis    5. Vertebrogenic low back pain    6.  Lumbar facet arthropathy          -MRI cervical spine  MRI lumbar spine    Reports were reviewed  Findings were discussed in detail with patient  Images were independently viewed and discussed with patient and shown to the patient and accompanying family member    Pertinent finding include lumbar spinal stenosis moderate at L3-L4 which I think is contributing for chronic back and leg pain issues that comes with standing and walking  I will recommend for repeat lumbar epidural injection  If that only provide short-term relief then consider for minimally invasive lumbar decompression using mild procedure    Chronic persistent axial midline back pain that aggravated with forward bending is likely related to severe degenerative disc disease and Modic changes    Neck pain that radiates down both arms is likely associated with cervical spinal stenosis    We will schedule for a cervical epidural in future  If that do not help then will consider for neurosurgical referral    Plan discussed with patient  He is agreeable to the plan    He reports no relief from [address]  Requesting to switch to Percocet  Automated prescription report reviewed  Safe use of medication discussed  We will change to Percocet 1 tablet as needed daily  Orders Placed This Encounter   Procedures    CO NJX DX/THER SBST INTRLMNR LMBR/SAC W/IMG GDN      Orders Placed This Encounter   Medications    oxyCODONE-acetaminophen (PERCOCET) 5-325 MG per tablet     Sig: Take 1 tablet by mouth daily as needed for Pain for up to 30 days.      Dispense:  30 tablet     Refill:  0     Reduce doses taken as pain becomes manageable    Handicap Placard MISC     Sig: by Does not apply route     Dispense:  1 each     Refill:  0            Electronically signed by Sandra Carpenter MD on 12/14/2021 at 2:06 PM

## 2021-12-20 ENCOUNTER — HOSPITAL ENCOUNTER (OUTPATIENT)
Dept: GENERAL RADIOLOGY | Age: 61
Discharge: HOME | DRG: 812 | End: 2021-12-22
Payer: MEDICARE

## 2021-12-20 ENCOUNTER — HOSPITAL ENCOUNTER (OUTPATIENT)
Dept: VASCULAR LAB | Age: 61
Discharge: HOME | DRG: 812 | End: 2021-12-20
Payer: MEDICARE

## 2021-12-20 ENCOUNTER — OFFICE VISIT (OUTPATIENT)
Dept: PRIMARY CARE CLINIC | Age: 61
End: 2021-12-20
Payer: MEDICARE

## 2021-12-20 ENCOUNTER — HOSPITAL ENCOUNTER (OUTPATIENT)
Dept: PAIN MANAGEMENT | Age: 61
Discharge: HOME | DRG: 812 | End: 2021-12-20
Payer: MEDICARE

## 2021-12-20 ENCOUNTER — HOSPITAL ENCOUNTER (INPATIENT)
Age: 61
LOS: 3 days | Discharge: HOME | DRG: 812 | End: 2021-12-23
Attending: EMERGENCY MEDICINE | Admitting: FAMILY MEDICINE
Payer: MEDICARE

## 2021-12-20 ENCOUNTER — TELEPHONE (OUTPATIENT)
Dept: PRIMARY CARE CLINIC | Age: 61
End: 2021-12-20

## 2021-12-20 ENCOUNTER — HOSPITAL ENCOUNTER (OUTPATIENT)
Age: 61
Discharge: HOME | DRG: 812 | End: 2021-12-20
Payer: MEDICARE

## 2021-12-20 VITALS
HEART RATE: 90 BPM | DIASTOLIC BLOOD PRESSURE: 60 MMHG | BODY MASS INDEX: 31.73 KG/M2 | SYSTOLIC BLOOD PRESSURE: 118 MMHG | WEIGHT: 221.6 LBS | HEIGHT: 70 IN | OXYGEN SATURATION: 99 %

## 2021-12-20 VITALS
OXYGEN SATURATION: 99 % | RESPIRATION RATE: 16 BRPM | HEIGHT: 70 IN | DIASTOLIC BLOOD PRESSURE: 58 MMHG | TEMPERATURE: 97.8 F | WEIGHT: 210 LBS | HEART RATE: 76 BPM | SYSTOLIC BLOOD PRESSURE: 115 MMHG | BODY MASS INDEX: 30.06 KG/M2

## 2021-12-20 DIAGNOSIS — D64.9 SYMPTOMATIC ANEMIA: ICD-10-CM

## 2021-12-20 DIAGNOSIS — M54.51 VERTEBROGENIC LOW BACK PAIN: ICD-10-CM

## 2021-12-20 DIAGNOSIS — R17 JAUNDICE: Primary | ICD-10-CM

## 2021-12-20 DIAGNOSIS — M48.062 SPINAL STENOSIS OF LUMBAR REGION WITH NEUROGENIC CLAUDICATION: ICD-10-CM

## 2021-12-20 DIAGNOSIS — K74.3 HEPATIC CIRRHOSIS DUE TO PRIMARY BILIARY CHOLANGITIS (HCC): ICD-10-CM

## 2021-12-20 DIAGNOSIS — D64.9 SYMPTOMATIC ANEMIA: Primary | ICD-10-CM

## 2021-12-20 DIAGNOSIS — R06.00 DYSPNEA, UNSPECIFIED TYPE: ICD-10-CM

## 2021-12-20 DIAGNOSIS — M47.816 LUMBAR FACET ARTHROPATHY: ICD-10-CM

## 2021-12-20 DIAGNOSIS — R60.0 EDEMA OF LEFT UPPER ARM: ICD-10-CM

## 2021-12-20 DIAGNOSIS — K70.30 ALCOHOLIC CIRRHOSIS, UNSPECIFIED WHETHER ASCITES PRESENT (HCC): ICD-10-CM

## 2021-12-20 DIAGNOSIS — D69.6 THROMBOCYTOPENIA (HCC): ICD-10-CM

## 2021-12-20 DIAGNOSIS — R17 JAUNDICE: ICD-10-CM

## 2021-12-20 DIAGNOSIS — B18.2 HEP C W/O COMA, CHRONIC (HCC): ICD-10-CM

## 2021-12-20 DIAGNOSIS — R60.1 GENERALIZED EDEMA: ICD-10-CM

## 2021-12-20 DIAGNOSIS — M48.02 CERVICAL SPINAL STENOSIS: ICD-10-CM

## 2021-12-20 DIAGNOSIS — M48.062 SPINAL STENOSIS OF LUMBAR REGION WITH NEUROGENIC CLAUDICATION: Primary | ICD-10-CM

## 2021-12-20 DIAGNOSIS — R52 PAIN: ICD-10-CM

## 2021-12-20 PROBLEM — E78.5 HLD (HYPERLIPIDEMIA): Status: ACTIVE | Noted: 2021-12-20

## 2021-12-20 PROBLEM — Z87.891 FORMER SMOKER: Chronic | Status: ACTIVE | Noted: 2021-12-20

## 2021-12-20 PROBLEM — I95.9 HYPOTENSION: Status: ACTIVE | Noted: 2021-12-20

## 2021-12-20 LAB
-: NORMAL
ABSOLUTE BANDS #: 0.05 K/UL (ref 0–1)
ABSOLUTE EOS #: 0 K/UL (ref 0–0.4)
ABSOLUTE IMMATURE GRANULOCYTE: ABNORMAL K/UL (ref 0–0.3)
ABSOLUTE LYMPH #: 0.14 K/UL (ref 1–4.8)
ABSOLUTE MONO #: 0.14 K/UL (ref 0.1–1.3)
ABSOLUTE RETIC #: 0.07 M/UL (ref 0.02–0.1)
ALBUMIN SERPL-MCNC: 3.1 G/DL (ref 3.5–5.2)
ALBUMIN/GLOBULIN RATIO: ABNORMAL (ref 1–2.5)
ALP BLD-CCNC: 105 U/L (ref 40–129)
ALT SERPL-CCNC: 7 U/L (ref 5–41)
ANION GAP SERPL CALCULATED.3IONS-SCNC: 11 MMOL/L (ref 9–17)
AST SERPL-CCNC: 15 U/L
BANDS: 1 % (ref 0–10)
BASOPHILS # BLD: 0 % (ref 0–2)
BASOPHILS ABSOLUTE: 0 K/UL (ref 0–0.2)
BILIRUB SERPL-MCNC: 1.91 MG/DL (ref 0.3–1.2)
BNP INTERPRETATION: ABNORMAL
BUN BLDV-MCNC: 5 MG/DL (ref 8–23)
BUN/CREAT BLD: ABNORMAL (ref 9–20)
CALCIUM SERPL-MCNC: 8.7 MG/DL (ref 8.6–10.4)
CHLORIDE BLD-SCNC: 98 MMOL/L (ref 98–107)
CO2: 24 MMOL/L (ref 20–31)
CREAT SERPL-MCNC: 0.59 MG/DL (ref 0.7–1.2)
DATE, STOOL #1: NORMAL
DATE, STOOL #2: NORMAL
DATE, STOOL #3: NORMAL
DIFFERENTIAL TYPE: ABNORMAL
EOSINOPHILS RELATIVE PERCENT: 0 % (ref 0–4)
GFR AFRICAN AMERICAN: >60 ML/MIN
GFR NON-AFRICAN AMERICAN: >60 ML/MIN
GFR SERPL CREATININE-BSD FRML MDRD: ABNORMAL ML/MIN/{1.73_M2}
GFR SERPL CREATININE-BSD FRML MDRD: ABNORMAL ML/MIN/{1.73_M2}
GLUCOSE BLD-MCNC: 168 MG/DL (ref 70–99)
HCT VFR BLD CALC: 14.9 % (ref 41–53)
HCT VFR BLD CALC: 15.2 % (ref 41–53)
HCT VFR BLD CALC: 17.8 % (ref 41–53)
HEMOCCULT SP1 STL QL: NEGATIVE
HEMOCCULT SP2 STL QL: NORMAL
HEMOCCULT SP3 STL QL: NORMAL
HEMOGLOBIN: 4.2 G/DL (ref 13.5–17.5)
HEMOGLOBIN: 4.3 G/DL (ref 13.5–17.5)
HEMOGLOBIN: 5.2 G/DL (ref 13.5–17.5)
IMMATURE GRANULOCYTES: ABNORMAL %
IMMATURE RETIC FRACT: ABNORMAL %
LYMPHOCYTES # BLD: 3 % (ref 24–44)
MCH RBC QN AUTO: 18.3 PG (ref 26–34)
MCHC RBC AUTO-ENTMCNC: 27.7 G/DL (ref 31–37)
MCV RBC AUTO: 66 FL (ref 80–100)
MONOCYTES # BLD: 3 % (ref 1–7)
MORPHOLOGY: ABNORMAL
NRBC AUTOMATED: ABNORMAL PER 100 WBC
PDW BLD-RTO: 19.4 % (ref 11.5–14.9)
PLATELET # BLD: 160 K/UL (ref 150–450)
PLATELET ESTIMATE: ABNORMAL
PMV BLD AUTO: 7.5 FL (ref 6–12)
POTASSIUM SERPL-SCNC: 3.4 MMOL/L (ref 3.7–5.3)
PRO-BNP: 602 PG/ML
RBC # BLD: 2.3 M/UL (ref 4.5–5.9)
RBC # BLD: ABNORMAL 10*6/UL
REASON FOR REJECTION: NORMAL
RETIC %: 3.2 % (ref 0.5–2)
RETIC HEMOGLOBIN: ABNORMAL PG (ref 28.2–35.7)
SEG NEUTROPHILS: 93 % (ref 36–66)
SEGMENTED NEUTROPHILS ABSOLUTE COUNT: 4.47 K/UL (ref 1.3–9.1)
SODIUM BLD-SCNC: 133 MMOL/L (ref 135–144)
TIME, STOOL #1: NORMAL
TIME, STOOL #2: NORMAL
TIME, STOOL #3: NORMAL
TOTAL PROTEIN: 5.8 G/DL (ref 6.4–8.3)
TROPONIN INTERP: NORMAL
TROPONIN T: NORMAL NG/ML
TROPONIN, HIGH SENSITIVITY: <6 NG/L (ref 0–22)
WBC # BLD: 4.8 K/UL (ref 3.5–11)
WBC # BLD: ABNORMAL 10*3/UL
ZZ NTE CLEAN UP: ORDERED TEST: NORMAL
ZZ NTE WITH NAME CLEAN UP: SPECIMEN SOURCE: NORMAL

## 2021-12-20 PROCEDURE — 3E0233Z INTRODUCTION OF ANTI-INFLAMMATORY INTO MUSCLE, PERCUTANEOUS APPROACH: ICD-10-PCS | Performed by: ANESTHESIOLOGY

## 2021-12-20 PROCEDURE — 85045 AUTOMATED RETICULOCYTE COUNT: CPT

## 2021-12-20 PROCEDURE — 99283 EMERGENCY DEPT VISIT LOW MDM: CPT

## 2021-12-20 PROCEDURE — 6370000000 HC RX 637 (ALT 250 FOR IP)

## 2021-12-20 PROCEDURE — 36430 TRANSFUSION BLD/BLD COMPNT: CPT

## 2021-12-20 PROCEDURE — 86901 BLOOD TYPING SEROLOGIC RH(D): CPT

## 2021-12-20 PROCEDURE — 83010 ASSAY OF HAPTOGLOBIN QUANT: CPT

## 2021-12-20 PROCEDURE — 6370000000 HC RX 637 (ALT 250 FOR IP): Performed by: STUDENT IN AN ORGANIZED HEALTH CARE EDUCATION/TRAINING PROGRAM

## 2021-12-20 PROCEDURE — 82728 ASSAY OF FERRITIN: CPT

## 2021-12-20 PROCEDURE — 85018 HEMOGLOBIN: CPT

## 2021-12-20 PROCEDURE — 6370000000 HC RX 637 (ALT 250 FOR IP): Performed by: EMERGENCY MEDICINE

## 2021-12-20 PROCEDURE — 2580000003 HC RX 258: Performed by: STUDENT IN AN ORGANIZED HEALTH CARE EDUCATION/TRAINING PROGRAM

## 2021-12-20 PROCEDURE — 99214 OFFICE O/P EST MOD 30 MIN: CPT | Performed by: PHYSICIAN ASSISTANT

## 2021-12-20 PROCEDURE — 6360000002 HC RX W HCPCS: Performed by: ANESTHESIOLOGY

## 2021-12-20 PROCEDURE — 80053 COMPREHEN METABOLIC PANEL: CPT

## 2021-12-20 PROCEDURE — 85025 COMPLETE CBC W/AUTO DIFF WBC: CPT

## 2021-12-20 PROCEDURE — 82270 OCCULT BLOOD FECES: CPT

## 2021-12-20 PROCEDURE — 6360000002 HC RX W HCPCS: Performed by: STUDENT IN AN ORGANIZED HEALTH CARE EDUCATION/TRAINING PROGRAM

## 2021-12-20 PROCEDURE — 83550 IRON BINDING TEST: CPT

## 2021-12-20 PROCEDURE — 99152 MOD SED SAME PHYS/QHP 5/>YRS: CPT | Performed by: ANESTHESIOLOGY

## 2021-12-20 PROCEDURE — 85014 HEMATOCRIT: CPT

## 2021-12-20 PROCEDURE — 62323 NJX INTERLAMINAR LMBR/SAC: CPT | Performed by: ANESTHESIOLOGY

## 2021-12-20 PROCEDURE — 84484 ASSAY OF TROPONIN QUANT: CPT

## 2021-12-20 PROCEDURE — 6360000004 HC RX CONTRAST MEDICATION: Performed by: ANESTHESIOLOGY

## 2021-12-20 PROCEDURE — 62323 NJX INTERLAMINAR LMBR/SAC: CPT

## 2021-12-20 PROCEDURE — 86900 BLOOD TYPING SEROLOGIC ABO: CPT

## 2021-12-20 PROCEDURE — 82306 VITAMIN D 25 HYDROXY: CPT

## 2021-12-20 PROCEDURE — 36415 COLL VENOUS BLD VENIPUNCTURE: CPT

## 2021-12-20 PROCEDURE — 86880 COOMBS TEST DIRECT: CPT

## 2021-12-20 PROCEDURE — 86850 RBC ANTIBODY SCREEN: CPT

## 2021-12-20 PROCEDURE — 83880 ASSAY OF NATRIURETIC PEPTIDE: CPT

## 2021-12-20 PROCEDURE — 83540 ASSAY OF IRON: CPT

## 2021-12-20 PROCEDURE — 3209999900 FLUORO FOR SURGICAL PROCEDURES

## 2021-12-20 PROCEDURE — P9016 RBC LEUKOCYTES REDUCED: HCPCS

## 2021-12-20 PROCEDURE — 2060000000 HC ICU INTERMEDIATE R&B

## 2021-12-20 PROCEDURE — 86920 COMPATIBILITY TEST SPIN: CPT

## 2021-12-20 PROCEDURE — 93971 EXTREMITY STUDY: CPT

## 2021-12-20 PROCEDURE — C9113 INJ PANTOPRAZOLE SODIUM, VIA: HCPCS | Performed by: STUDENT IN AN ORGANIZED HEALTH CARE EDUCATION/TRAINING PROGRAM

## 2021-12-20 RX ORDER — SODIUM CHLORIDE 0.9 % (FLUSH) 0.9 %
5-40 SYRINGE (ML) INJECTION PRN
Status: DISCONTINUED | OUTPATIENT
Start: 2021-12-20 | End: 2021-12-23 | Stop reason: HOSPADM

## 2021-12-20 RX ORDER — DEXAMETHASONE SODIUM PHOSPHATE 10 MG/ML
INJECTION, SOLUTION INTRAMUSCULAR; INTRAVENOUS
Status: COMPLETED | OUTPATIENT
Start: 2021-12-20 | End: 2021-12-20

## 2021-12-20 RX ORDER — SODIUM CHLORIDE 9 MG/ML
10 INJECTION INTRAVENOUS DAILY
Status: DISCONTINUED | OUTPATIENT
Start: 2021-12-23 | End: 2021-12-20 | Stop reason: SDUPTHER

## 2021-12-20 RX ORDER — OXYCODONE HYDROCHLORIDE AND ACETAMINOPHEN 5; 325 MG/1; MG/1
1 TABLET ORAL DAILY PRN
Status: DISCONTINUED | OUTPATIENT
Start: 2021-12-21 | End: 2021-12-21

## 2021-12-20 RX ORDER — SODIUM CHLORIDE 0.9 % (FLUSH) 0.9 %
5-40 SYRINGE (ML) INJECTION EVERY 12 HOURS SCHEDULED
Status: DISCONTINUED | OUTPATIENT
Start: 2021-12-20 | End: 2021-12-23 | Stop reason: HOSPADM

## 2021-12-20 RX ORDER — ACETAMINOPHEN 325 MG/1
650 TABLET ORAL EVERY 6 HOURS PRN
Status: DISCONTINUED | OUTPATIENT
Start: 2021-12-20 | End: 2021-12-23 | Stop reason: HOSPADM

## 2021-12-20 RX ORDER — FENTANYL CITRATE 50 UG/ML
INJECTION, SOLUTION INTRAMUSCULAR; INTRAVENOUS
Status: COMPLETED | OUTPATIENT
Start: 2021-12-20 | End: 2021-12-20

## 2021-12-20 RX ORDER — ONDANSETRON 4 MG/1
4 TABLET, ORALLY DISINTEGRATING ORAL EVERY 8 HOURS PRN
Status: DISCONTINUED | OUTPATIENT
Start: 2021-12-20 | End: 2021-12-23 | Stop reason: HOSPADM

## 2021-12-20 RX ORDER — SALIVA STIMULANT COMB. NO.3
SPRAY, NON-AEROSOL (ML) MUCOUS MEMBRANE PRN
Status: DISCONTINUED | OUTPATIENT
Start: 2021-12-20 | End: 2021-12-23 | Stop reason: HOSPADM

## 2021-12-20 RX ORDER — ATORVASTATIN CALCIUM 20 MG/1
20 TABLET, FILM COATED ORAL NIGHTLY
Status: DISCONTINUED | OUTPATIENT
Start: 2021-12-20 | End: 2021-12-23 | Stop reason: HOSPADM

## 2021-12-20 RX ORDER — OCTREOTIDE ACETATE 50 UG/ML
50 INJECTION, SOLUTION INTRAVENOUS; SUBCUTANEOUS ONCE
Status: COMPLETED | OUTPATIENT
Start: 2021-12-20 | End: 2021-12-20

## 2021-12-20 RX ORDER — SPIRONOLACTONE 25 MG/1
50 TABLET ORAL DAILY
Status: DISCONTINUED | OUTPATIENT
Start: 2021-12-21 | End: 2021-12-23 | Stop reason: HOSPADM

## 2021-12-20 RX ORDER — POTASSIUM CHLORIDE 7.45 MG/ML
10 INJECTION INTRAVENOUS
Status: DISPENSED | OUTPATIENT
Start: 2021-12-21 | End: 2021-12-21

## 2021-12-20 RX ORDER — PANTOPRAZOLE SODIUM 40 MG/10ML
40 INJECTION, POWDER, LYOPHILIZED, FOR SOLUTION INTRAVENOUS DAILY
Status: DISCONTINUED | OUTPATIENT
Start: 2021-12-23 | End: 2021-12-20 | Stop reason: SDUPTHER

## 2021-12-20 RX ORDER — ACETAMINOPHEN 650 MG/1
650 SUPPOSITORY RECTAL EVERY 6 HOURS PRN
Status: DISCONTINUED | OUTPATIENT
Start: 2021-12-20 | End: 2021-12-23 | Stop reason: HOSPADM

## 2021-12-20 RX ORDER — FLUOXETINE HYDROCHLORIDE 20 MG/1
20 CAPSULE ORAL DAILY
Status: DISCONTINUED | OUTPATIENT
Start: 2021-12-21 | End: 2021-12-23 | Stop reason: HOSPADM

## 2021-12-20 RX ORDER — SODIUM CHLORIDE 9 MG/ML
INJECTION, SOLUTION INTRAVENOUS PRN
Status: DISCONTINUED | OUTPATIENT
Start: 2021-12-20 | End: 2021-12-23 | Stop reason: HOSPADM

## 2021-12-20 RX ORDER — SODIUM CHLORIDE 9 MG/ML
INJECTION, SOLUTION INTRAVENOUS CONTINUOUS
Status: DISCONTINUED | OUTPATIENT
Start: 2021-12-20 | End: 2021-12-23 | Stop reason: HOSPADM

## 2021-12-20 RX ORDER — ONDANSETRON 2 MG/ML
4 INJECTION INTRAMUSCULAR; INTRAVENOUS EVERY 6 HOURS PRN
Status: DISCONTINUED | OUTPATIENT
Start: 2021-12-20 | End: 2021-12-23 | Stop reason: HOSPADM

## 2021-12-20 RX ORDER — SODIUM CHLORIDE 9 MG/ML
25 INJECTION, SOLUTION INTRAVENOUS PRN
Status: DISCONTINUED | OUTPATIENT
Start: 2021-12-20 | End: 2021-12-23 | Stop reason: HOSPADM

## 2021-12-20 RX ORDER — HYDROXYZINE HYDROCHLORIDE 25 MG/1
25 TABLET, FILM COATED ORAL 3 TIMES DAILY PRN
Status: DISCONTINUED | OUTPATIENT
Start: 2021-12-21 | End: 2021-12-23 | Stop reason: HOSPADM

## 2021-12-20 RX ORDER — PANTOPRAZOLE SODIUM 40 MG/1
40 TABLET, DELAYED RELEASE ORAL
Status: DISCONTINUED | OUTPATIENT
Start: 2021-12-21 | End: 2021-12-20 | Stop reason: SDUPTHER

## 2021-12-20 RX ORDER — MIDAZOLAM HYDROCHLORIDE 1 MG/ML
INJECTION INTRAMUSCULAR; INTRAVENOUS
Status: COMPLETED | OUTPATIENT
Start: 2021-12-20 | End: 2021-12-20

## 2021-12-20 RX ADMIN — OXYCODONE HYDROCHLORIDE AND ACETAMINOPHEN 1 TABLET: 5; 325 TABLET ORAL at 22:34

## 2021-12-20 RX ADMIN — IOHEXOL 0.5 ML: 180 INJECTION INTRAVENOUS at 09:58

## 2021-12-20 RX ADMIN — PANTOPRAZOLE SODIUM 8 MG/HR: 40 INJECTION, POWDER, FOR SOLUTION INTRAVENOUS at 22:00

## 2021-12-20 RX ADMIN — Medication 50 MCG: at 09:56

## 2021-12-20 RX ADMIN — SODIUM CHLORIDE: 9 INJECTION, SOLUTION INTRAVENOUS at 20:35

## 2021-12-20 RX ADMIN — SODIUM CHLORIDE 80 MG: 9 INJECTION, SOLUTION INTRAVENOUS at 21:29

## 2021-12-20 RX ADMIN — DEXAMETHASONE SODIUM PHOSPHATE 10 MG: 10 INJECTION, SOLUTION INTRAMUSCULAR; INTRAVENOUS at 09:59

## 2021-12-20 RX ADMIN — ATORVASTATIN CALCIUM 20 MG: 20 TABLET, FILM COATED ORAL at 20:39

## 2021-12-20 RX ADMIN — MIDAZOLAM 1 MG: 1 INJECTION INTRAMUSCULAR; INTRAVENOUS at 09:55

## 2021-12-20 RX ADMIN — Medication: at 19:06

## 2021-12-20 RX ADMIN — OCTREOTIDE ACETATE 50 MCG: 50 INJECTION, SOLUTION INTRAVENOUS; SUBCUTANEOUS at 21:26

## 2021-12-20 ASSESSMENT — PAIN DESCRIPTION - DIRECTION: RADIATING_TOWARDS: BILATERAL LEGS TO FEET

## 2021-12-20 ASSESSMENT — ENCOUNTER SYMPTOMS
VOICE CHANGE: 1
COUGH: 0
SINUS PRESSURE: 0
SHORTNESS OF BREATH: 1
SHORTNESS OF BREATH: 1
CONSTIPATION: 0
BLOOD IN STOOL: 0
COUGH: 0
NAUSEA: 0
SORE THROAT: 0
PHOTOPHOBIA: 0
TROUBLE SWALLOWING: 0
DIARRHEA: 0
SORE THROAT: 0
RHINORRHEA: 0
VOMITING: 0
CONSTIPATION: 1
DIARRHEA: 0
CHEST TIGHTNESS: 0
BACK PAIN: 1
PHOTOPHOBIA: 0
ABDOMINAL PAIN: 0
VOMITING: 0
ABDOMINAL PAIN: 0
RHINORRHEA: 0
EYE DISCHARGE: 0
ABDOMINAL DISTENTION: 0

## 2021-12-20 ASSESSMENT — PAIN DESCRIPTION - PROGRESSION: CLINICAL_PROGRESSION: GRADUALLY WORSENING

## 2021-12-20 ASSESSMENT — PAIN DESCRIPTION - LOCATION
LOCATION: BACK
LOCATION: BACK

## 2021-12-20 ASSESSMENT — PAIN SCALES - GENERAL
PAINLEVEL_OUTOF10: 8

## 2021-12-20 ASSESSMENT — PAIN DESCRIPTION - FREQUENCY: FREQUENCY: CONTINUOUS

## 2021-12-20 ASSESSMENT — PAIN DESCRIPTION - PAIN TYPE
TYPE: CHRONIC PAIN
TYPE: CHRONIC PAIN

## 2021-12-20 ASSESSMENT — PAIN DESCRIPTION - ORIENTATION
ORIENTATION: RIGHT;LEFT;LOWER
ORIENTATION: LOWER;RIGHT;LEFT

## 2021-12-20 ASSESSMENT — PAIN DESCRIPTION - ONSET: ONSET: ON-GOING

## 2021-12-20 ASSESSMENT — PAIN DESCRIPTION - DESCRIPTORS: DESCRIPTORS: STABBING;RADIATING

## 2021-12-20 ASSESSMENT — PAIN - FUNCTIONAL ASSESSMENT
PAIN_FUNCTIONAL_ASSESSMENT: 0-10
PAIN_FUNCTIONAL_ASSESSMENT: PREVENTS OR INTERFERES SOME ACTIVE ACTIVITIES AND ADLS
PAIN_FUNCTIONAL_ASSESSMENT: 0-10

## 2021-12-20 NOTE — ED TRIAGE NOTES
Mode of arrival (squad #, walk in, police, etc) : Walk in        Chief complaint(s): Abnormal labs        Arrival Note (brief scenario, treatment PTA, etc). : Pt arrives to ED c/o low hemoglobin. PAtient had blood work done today due to swelling and paleness that was noted at his outpatient procedure for his back. Patient reports feeling tired and short of breath for \"quite a while. \"         C= \"Have you ever felt that you should Cut down on your drinking? \"  No  A= \"Have people Annoyed you by criticizing your drinking? \"  No  G= \"Have you ever felt bad or Guilty about your drinking? \"  No  E= \"Have you ever had a drink as an Eye-opener first thing in the morning to steady your nerves or to help a hangover? \"  No      Deferred []      Reason for deferring: N/A    *If yes to two or more: probable alcohol abuse. *

## 2021-12-20 NOTE — RESULT ENCOUNTER NOTE
Call and advise patient that the results were able to be performed due to a clotted specimen. Please have patient be drawn again.

## 2021-12-20 NOTE — PROGRESS NOTES
Discharge criteria met. Patient alert and oriented x3  Post procedure dressing dry and intact. Sensory and motor function intact as per pre-procedure. Instructions and follow up reviewed with pt at patient at discharge. Patient discharged per wheelchair, gait steady. Reviewed instructions with patient and [address] [place] [address]. Nilam notified of patient has appt with PCP office for evaluation at 36 for left arm and color pale @ 1028.

## 2021-12-20 NOTE — PLAN OF CARE
1960 Dr. Mazie Severance notified of left hand,wrist and forearm pitting edema. Patient relates started 1 week ago, Patient denies injury or pain. Patient to be evaluated by his PCP today per Dr. Mazie Severance. Patient verbalized understanding. 4690 Dr. Mazie Severance notified of platelet count 1-06-87. No labs ordered. Consent obtained  0666 called Garrison Easton PA office- [address] [facility] Primary care office- [facility] and notified of above and patient to be seen today at 1130.  Patient notified of appointment

## 2021-12-20 NOTE — OP NOTE
Patient Name: Fernando Orozco   YOB: 1960  Room/Bed: Room/bed info not found  Medical Record Number: [id number]  Date: 12/20/2021       Sedation/ Anesthesia Plan:   intravenous sedation   as needed. Medications Planned:   midazolam (Versed) / Fentanyl  Intravenously  as needed. Preoperative Diagnosis:    1. Spinal stenosis of lumbar region with neurogenic claudication        Postoperative Diagnosis:    1. Spinal stenosis of lumbar region with neurogenic claudication        Procedure Performed:  Lumbar epidural steroid injection under fluoroscopy guidance    Blood Loss: None    Procedure: The Patient was seen in the preop area, chart was reviewed, informed consent was obtained. Patient was taken to procedure room and was placed in prone position. Vital signs were monitored through out the  Procedure. A time out was completed. The skin over the back was prepped and draped in sterile manner. The target point was marked at The interlaminar space at L3/4 . Skin and deep tissues were anesthetized with 1 % lidocaine. A 20-gauge Tuohy epidural needlele was advanced  under fluoroscopy guidance in AP view. Epidural space was identified using MED technique. Position ws confirmed in Lateral view. Then after negative aspiration contrast dye Omnipaque-180 was injected with live fluoroscopy in AP views that showed  spread of the contrast in the epidural space  and no vascular runoff or intrathecal spread. Finally 5 ml of treatment solution containing 4 mL of preservative-free normal saline mixed with 1 mL of dexamethasone 10 mg/mL was injected. The needle was removed and a Band-Aid was placed over the needle  insertion site. The patient's vital signs remained stable and the patient tolerated the procedure well.       Electronically signed by Betty Brandon MD on 12/20/2021 at 10:02 AM   SEDATION NOTE:    ASA CLASSIFICATION  3  MP   CLASSIFICATION  3    Moderate intravenous conscious sedation was supervised by Dr. Karel Lacy  The patient was independently monitored by a Registered Nurse assigned to the Procedure Room  Monitoring included automated blood pressure, continuous EKG, Capnography and continuous pulse oximetry. The detailed Conscious Record is permanently stored in the [place] [address].      The following is the conscious sedation record;  Start Time:  0952  End times:  1002  Duration:  10 MINUTES  MEDS GIVEN 1 MG VERSED AND 50 MCG FENTANYL

## 2021-12-20 NOTE — H&P
[address] [place] [address]     HISTORY AND PHYSICAL EXAMINATION            Date:   12/20/2021  Patient name:  Mina Tamez  Date of admission:  12/20/2021  5:12 PM  MRN:   [id number]  Account:  [de-identified]  YOB: 1960  PCP:    VASU Rios  Room:   09/09  Code Status:    Full Code    Chief Complaint:     Chief Complaint   Patient presents with    Abnormal Lab     History Obtained From:     patient, electronic medical record    History of Present Illness:     Mina Tamez is a 57 y.o. male with past medical history of arthritis, chronic back pain 2/2 cervical and lumbar spinal stenosis with neurogenic claudication, hep C s/p treatment, liver cirrhosis, previous smoker (~50+ pack years, quit a few years ago), esophageal CA currently in remission s/p chemotherapy and radiation, previous alcohol use (6 pack of beer per day, quit a few years ago), depression, and anxiety who presents to the ED on 12/20/21 after being referred by pain management doctor for abnormal lab values with low Hgb. Labs drawn as outpatient today via his regular pain management physician whom pt was seeing today for lumbar epidural steroid injection for management of spinal stenosis and chronic back pain. Admits he has been feeling progressively increasing fatigue, weakness, shortness of breath exacerbated by exertion and limiting the distance he can walk, lightheadedness, and poor p.o. intake secondary to no appetite for the past month. Denies any cravings of ice or other. Admits to a few falls in the past month without any trauma/bleeding and without any hits to the head. 1 week ago patient also had itching and swelling of left arm which improved after he applied lidocaine topically to it.  Bilateral leg swelling noted since 2-3 days ago and pt reports 10 lb weight gain within the last week despite little to no p.o. intake. In the ED, patient found to be hypotensive on presentation with /46 labs showing hemoglobin of 4.2. Type and cross ordered and transfusion of 1 PRBC started in ED. Decision was made to admit the patient for management and work-up of symptomatic anemia.     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 Robb Cutler, orthopedic, every 3-4 months, gets steroid injection    Lezama esophagus     BPH (benign prostatic hypertrophy)     Cholelithiasis     Cirrhosis ([address]     COVID-19 12/2020    pt reports he had a positive test while at [facility] in 2020, was asymptomatic    COVID-19 vaccine series completed 5/20/2021, 6/22/2021    Moderna 5/20/2021, 6/22/2021    DDD (degenerative disc disease), lumbar     Depression     Esophageal cancer ([address]     INVASIVE ADENOCARCINOMA ARISING IN TUBULAR ADENOMA WITH HIGH GRADE DYSPLASIA, ASSOCIATED WITH FOCAL INTESTINAL METAPLASIA     Esophageal varices ([address]     Fatty liver     GERD (gastroesophageal reflux disease)     Hep C w/o coma, chronic ([address]     History of alcohol abuse     6-12 beers a day; quit drinking July 2016    History of blood transfusion     History of colon polyps 2016    History of tobacco abuse     [place] (hard of hearing)     Hyperlipidemia     Hypertension     Port-A-Cath in place     right upper chest    Stomach ulcer     hx of    Tubular adenoma of colon 2016, 2018    Vitamin D deficiency     Wears glasses         Past SurgicalHistory:     Past Surgical History:   Procedure Laterality Date    BUNIONECTOMY      twice on right side    BUNIONECTOMY Left     CARPAL TUNNEL RELEASE Right     COLONOSCOPY      at age 35    COLONOSCOPY  10/05/2016    polyps-pathology tubular adenoma, and abnormal looking mucosa right colon-pathology-tubular adenoma    COLONOSCOPY N/A 3/30/2018    COLONOSCOPY POLYPECTOMY COLD BIOPSY performed by Min Smith MD at [address] [place] [address]  03/30/2018    Small polyp in the sigmoid colon and excised with biopsy forceps--tubular adenoma    ENDOSCOPY, COLON, DIAGNOSTIC      EGD    IR PORT PLACEMENT EQUAL OR GREATER THAN 5 YEARS  4/19/2021    IR PORT PLACEMENT EQUAL OR GREATER THAN 5 YEARS 4/19/2021 [facility] PROCEDURES    KNEE SURGERY Left     cyst removed    NASAL SEPTUM SURGERY      NERVE BLOCK Right 11/23/2020    NERVE BLOCK RIGHT CERVICAL STEROID INJECTION  C3-C6 performed by Alejandrina Chin MD at [address] [place] [address]  01/04/16    steroid injection C7 T1    OTHER SURGICAL HISTORY  11/21/2016    Bilateral Lumbar CACHORRO L4-L5 injections    OTHER SURGICAL HISTORY  12/19/2016    lumbar steroid injection    OTHER SURGICAL HISTORY  09/28/2018    BILATERAL L5 CACHORRO (N/A Back)    OTHER SURGICAL HISTORY Right 11/23/2020    cervical injection    PAIN MANAGEMENT PROCEDURE Left 7/9/2020    EPIDURAL STEROID INJECTION LEFT L4 L5 performed by Alejandrina Chin MD at [place] [address] Left 7/20/2020    LEFT L4 L5 EPIDURAL STEROID INJECTION performed by Alejandrina Chin MD at [place] [address] Bilateral 8/17/2020    LUMBAR FACET BILATERAL L2-L5 performed by Alejandrina Chin MD at [place] [address] Bilateral 12/7/2020    NERVE BLOCK BILATERAL LUMBAR MEDIAL BRANCH L2-L5 performed by Alejandrina Chin MD at [address] AA&/STRD TFRML EPI LUMBAR/SACRAL 1 LEVEL Bilateral 9/6/2018    BILATERAL L5 CACHORRO performed by Alejandrina Chin MD at [address] AA&/STRD TFRML EPI LUMBAR/SACRAL 1 LEVEL N/A 9/28/2018    BILATERAL L5 CACHORRO performed by Alejandrina Chin MD at [address] N/CARPAL [address] Right 8/29/2017    CARPAL TUNNEL RELEASE RIGHT performed by Solange Blanchard MD at [facility] N/CARPAL TUNNEL SURG Left 10/31/2017    CARPAL TUNNEL RELEASE performed by Ben Ng MD at [address] [place] [address] 12/29/2020    EGD BIOPSY performed by Andrea Ritter MD at [address] [place] [address] 2/2/2021    EGD BIOPSY and spot marking performed by Meggan Camarillo MD at [address] [place] [address] N/A 2/12/2021    ENDOSCOPIC ULTRASOUND, EGD performed by Wen العلي MD at [address] [place] [address]  2/12/2021    EGD DIAGNOSTIC ONLY performed by Wne العلي MD at [address] [place] [address] N/A 8/31/2021    EGD BIOPSY performed by Meggan Camarillo MD at [address] [facility]          Medications Prior to Admission:     Prior to Admission medications    Medication Sig Start Date End Date Taking? Authorizing Provider   oxyCODONE-acetaminophen (PERCOCET) 5-325 MG per tablet Take 1 tablet by mouth daily as needed for Pain for up to 30 days.  12/14/21 1/13/22 Yes Brigid Pratt MD   omeprazole (PRILOSEC) 40 MG delayed release capsule take 1 capsule by mouth EVERY MORNING BEFORE BREAKFAST 11/12/21  Yes Tino Anna MD   FLUoxetine (PROZAC) 20 MG capsule take 1 capsule by mouth once daily 11/12/21  Yes VASU Vivas   lidocaine-prilocaine (EMLA) 2.5-2.5 % cream Apply topically 45-60 mins prior to needle poke daily PRN 4/22/21  Yes Tino Anna MD   hydrOXYzine (VISTARIL) 25 MG capsule take 1 capsule by mouth three times a day if needed for anxiety 2/23/21  Yes MASSIMO Coleman CNP   atorvastatin (LIPITOR) 20 MG tablet take 1 tablet by mouth at bedtime 2/23/21  Yes MASSIMO Coleman CNP   spironolactone (ALDACTONE) 50 MG tablet take 1 tablet by mouth once daily 2/23/21  Yes MASSIMO Shin CNP   Handicap Placard MISC by Does not apply route 12/14/21   Brigid Pratt MD        Allergies:     Bee pollen and Pollen extract    Social History:     Tobacco:    reports that he quit smoking about 4 years ago. He has a 45.00 pack-year smoking history. He has never used smokeless tobacco.  Alcohol:      reports previous alcohol use. Drug Use:  reports previous drug use. Frequency: 1.00 time per week. Family History:     Family History   Problem Relation Age of Onset   Oliver Crowder Cancer Mother         pancreatic    Cancer Father         bone    Diabetes Sister     Asthma Brother        Review of Systems:     Positive and Negative as described in HPI. Review of Systems   Constitutional: Positive for appetite change (no appetite x1 month), fatigue and unexpected weight change (10 lb weight gain in past week). Negative for chills. HENT: Positive for voice change (huskier x few days). Negative for rhinorrhea, sore throat and trouble swallowing. Eyes: Negative for photophobia and visual disturbance. Respiratory: Positive for shortness of breath. Negative for cough. Cardiovascular: Positive for leg swelling (past few days). Negative for chest pain and palpitations. Gastrointestinal: Positive for constipation. Negative for abdominal pain, blood in stool, diarrhea, nausea and vomiting. Genitourinary: Negative for dysuria and hematuria. Musculoskeletal: Positive for arthralgias (chronic) and back pain (chronic). Neurological: Positive for weakness and light-headedness. Negative for headaches. Psychiatric/Behavioral: Negative for confusion and decreased concentration. Physical Exam:   /68   Pulse 75   Temp 98.2 °F (36.8 °C) (Oral)   Resp 12   Ht 5' 10\" (1.778 m)   Wt 220 lb (99.8 kg)   SpO2 97%   BMI 31.57 kg/m²   Temp (24hrs), Av °F (36.7 °C), Min:97.7 °F (36.5 °C), Max:98.2 °F (36.8 °C)    No results for input(s): POCGLU in the last 72 hours.   No intake or output data in the 24 hours ending 219  Vitals:    21 1933 21   BP:  131/67  125/68   Pulse: 74 82 71 75   Resp: 15 18 14 12   Temp:    98.2 °F (36.8 °C)   TempSrc:    Oral SpO2: 99% 99% 97% 97%   Weight:       Height:             Physical Exam  Constitutional:       General: He is not in acute distress. Comments: Sitting in bed, appears comfortable, but significant skin pallor noted. HENT:      Head: Normocephalic and atraumatic. Nose: Nose normal.      Mouth/Throat:      Pharynx: Oropharynx is clear. Comments: Possible mild tongue pallor  Eyes:      Comments: Conjunctival pallor present   Cardiovascular:      Rate and Rhythm: Normal rate and regular rhythm. Pulmonary:      Effort: No respiratory distress. Breath sounds: Normal breath sounds. Abdominal:      Tenderness: There is no abdominal tenderness. Comments: Hypoactive bowel sounds,  Abdomen feels somewhat hard to palpation concerning for constipation, but no rigidity/signs of surgical abd present. Musculoskeletal:         General: No tenderness or signs of injury. Right lower leg: Edema (pitting) present. Left lower leg: Edema (pitting) present. Skin:     Coloration: Skin is pale. Findings: No bruising. Neurological:      Mental Status: He is alert and oriented to person, place, and time. Gait: Gait normal.      Comments: No weakness of upper extremities   Psychiatric:         Mood and Affect: Mood normal.         Behavior: Behavior normal.         Investigations:     Laboratory Testing:  Recent Results (from the past 24 hour(s))   SPECIMEN REJECTION    Collection Time: 12/20/21 12:12 PM   Result Value Ref Range    Specimen Source . BLOOD     Ordered Test CDP CP BNP     Reason for Rejection Unable to perform testing: Specimen clotted.      - NOT REPORTED    Brain Natriuretic Peptide    Collection Time: 12/20/21  4:20 PM   Result Value Ref Range    Pro- (H) <300 pg/mL    BNP Interpretation NOT REPORTED    CBC Auto Differential    Collection Time: 12/20/21  4:20 PM   Result Value Ref Range    WBC 4.8 3.5 - 11.0 k/uL    RBC 2.30 (L) 4.5 - 5.9 m/uL    Hemoglobin 4.2 (LL) 13.5 - 17.5 g/dL    Hematocrit 15.2 (L) 41 - 53 %    MCV 66.0 (L) 80 - 100 fL    MCH 18.3 (L) 26 - 34 pg    MCHC 27.7 (L) 31 - 37 g/dL    RDW 19.4 (H) 11.5 - 14.9 %    Platelets 487 734 - 001 k/uL    MPV 7.5 6.0 - 12.0 fL    NRBC Automated NOT REPORTED per 100 WBC    Differential Type NOT REPORTED     Immature Granulocytes NOT REPORTED 0 %    Absolute Immature Granulocyte NOT REPORTED 0.00 - 0.30 k/uL    WBC Morphology NOT REPORTED     RBC Morphology NOT REPORTED     Platelet Estimate NOT REPORTED     Seg Neutrophils 93 (H) 36 - 66 %    Lymphocytes 3 (L) 24 - 44 %    Monocytes 3 1 - 7 %    Eosinophils % 0 0 - 4 %    Basophils 0 0 - 2 %    Bands 1 0 - 10 %    Segs Absolute 4.47 1.3 - 9.1 k/uL    Absolute Lymph # 0.14 (L) 1.0 - 4.8 k/uL    Absolute Mono # 0.14 0.1 - 1.3 k/uL    Absolute Eos # 0.00 0.0 - 0.4 k/uL    Basophils Absolute 0.00 0.0 - 0.2 k/uL    Absolute Bands # 0.05 0.0 - 1.0 k/uL    Morphology ANISOCYTOSIS PRESENT     Morphology HYPOCHROMIA PRESENT     Morphology MICROCYTOSIS PRESENT     Morphology 1+ OVALOCYTES    Comprehensive Metabolic Panel    Collection Time: 12/20/21  4:20 PM   Result Value Ref Range    Glucose 168 (H) 70 - 99 mg/dL    BUN 5 (L) 8 - 23 mg/dL    CREATININE 0.59 (L) 0.70 - 1.20 mg/dL    Bun/Cre Ratio NOT REPORTED 9 - 20    Calcium 8.7 8.6 - 10.4 mg/dL    Sodium 133 (L) 135 - 144 mmol/L    Potassium 3.4 (L) 3.7 - 5.3 mmol/L    Chloride 98 98 - 107 mmol/L    CO2 24 20 - 31 mmol/L    Anion Gap 11 9 - 17 mmol/L    Alkaline Phosphatase 105 40 - 129 U/L    ALT 7 5 - 41 U/L    AST 15 <40 U/L    Total Bilirubin 1.91 (H) 0.3 - 1.2 mg/dL    Total Protein 5.8 (L) 6.4 - 8.3 g/dL    Albumin 3.1 (L) 3.5 - 5.2 g/dL    Albumin/Globulin Ratio NOT REPORTED 1.0 - 2.5    GFR Non-African American >60 >60 mL/min    GFR African American >60 >60 mL/min    GFR Comment          GFR Staging NOT REPORTED    Reticulocytes    Collection Time: 12/20/21  4:20 PM   Result Value Ref Range    Retic % 3.2 (H) 0.5 - 2.0 %    Absolute Retic # 0.073 0.0245 - 0.098 M/uL    Immature Retic Fract NOT REPORTED %    Retic Hemoglobin NOT REPORTED 28.2 - 35.7 pg   TYPE AND SCREEN    Collection Time: 12/20/21  5:51 PM   Result Value Ref Range    Expiration Date 12/23/2021,2359     Arm Band Number [id number]     ABO/Rh AB POSITIVE     Antibody Screen NEGATIVE     PAVITHRA, Polyspecific NOT REPORTED     A1 Lectin NEGATIVE     Blood Bank Comment PT HAS AntiA1 ANTIBODY     Unit Number [id number]     Product Code Leukocyte Reduced Red Cell     Unit Divison 00     Dispense Status ISSUED     Transfusion Status OK TO TRANSFUSE     Crossmatch Result COMPATIBLE     Unit Number [id number]     Product Code Leukocyte Reduced Red Cell     Unit Divison 00     Dispense Status ALLOCATED     Transfusion Status OK TO TRANSFUSE     Crossmatch Result COMPATIBLE    Occult Blood Screen    Collection Time: 12/20/21  7:09 PM   Result Value Ref Range    Occult Blood, Stool #1 NEGATIVE NEGATIVE    Date, Stool #1 [phone]     Time, Stool #1 UNKNOWN     Occult Blood, Stool #2 NOT REPORTED NEGATIVE    Date, Stool #2 NOT REPORTED     Time, Stool #2 NOT REPORTED     Occult Blood, Stool #3 NOT REPORTED NEGATIVE    Date, Stool #3 NOT REPORTED     Time, Stool #3 NOT REPORTED        Imaging/Diagnostics:  MRI CERVICAL SPINE WO CONTRAST    Result Date: 12/7/2021  EXAMINATION: MRI OF THE CERVICAL SPINE WITHOUT CONTRAST 12/7/2021 7:36 am TECHNIQUE: Multiplanar multisequence MRI of the cervical spine was performed without the administration of intravenous contrast. COMPARISON: 11/02/2015. HISTORY: ORDERING SYSTEM PROVIDED HISTORY: Chronic neck pain TECHNOLOGIST PROVIDED HISTORY: Reason for Exam: neurogenic claudication, chronic neck pain, bilateral numbness and tingling of arms and legs FINDINGS: Suboptimal evaluation secondary to motion degradation in the axial sequences. BONES/ALIGNMENT: Straightening the cervical spine without significant spondylolisthesis.   Marrow signal appears within normal limits. Vertebral body heights are preserved. Multilevel intervertebral disc space narrowing, worse at C5-C6 and C6-C7 where there is associated Modic type 2 endplate changes. No suspicious marrow edema. SPINAL CORD: No abnormal cord signal is seen. SOFT TISSUES: No paraspinal mass identified. Partially visualized right mastoid effusion. C2-C3: Disc bulge. Facet hypertrophy. Mild thecal sac stenosis. Mild left foraminal narrowing. C3-C4: Disc bulge. No significant thecal sac stenosis or foraminal narrowing. C4-C5: Disc bulge. No significant thecal sac stenosis or foraminal narrowing. C5-C6: Disc osteophyte complex. Ligamentum flavum thickening. Uncovertebral hypertrophy. Motion degraded axial image. Suspected moderate thecal sac stenosis and severe bilateral foraminal narrowing. C6-C7: Disc osteophyte complex. Ligamentum flavum thickening. Uncovertebral hypertrophy. Motion degraded axial image. Suspected moderate thecal sac stenosis and severe bilateral foraminal narrowing. C7-T1: No significant thecal sac stenosis or foraminal narrowing. Partially visualized disc bulge at T2-T3 on the sagittal sequence resulting mild thecal sac stenosis. Multilevel cervical spondylosis, worse at C5-C6 and C6-C7. Motion degradation in axial sequences limits evaluation but there is suspected moderate thecal sac stenosis and severe bilateral foraminal narrowing at these levels. Findings appear relatively similar to 11/02/2015 allowing for motion degradation. MRI LUMBAR SPINE WO CONTRAST    Result Date: 12/7/2021  EXAMINATION: MRI OF THE LUMBAR SPINE WITHOUT CONTRAST, 12/7/2021 7:35 am TECHNIQUE: Multiplanar multisequence MRI of the lumbar spine was performed without the administration of intravenous contrast. COMPARISON: 09/16/2016.  HISTORY: ORDERING SYSTEM PROVIDED HISTORY: Neurogenic claudication [facility]) TECHNOLOGIST PROVIDED HISTORY: Reason for Exam: neurogenic claudication, chronic neck pain, bilateral numbness and tingling of arms and legs FINDINGS: BONES/ALIGNMENT: Trace retrolisthesis of L1 on L2 and of L4 on L5. Marrow signal appears within normal limits. Chronic T12 wedge compression fracture deformity with 40% height loss and mild bony retropulsion (no high-grade thecal sac stenosis). Multilevel intervertebral disc space narrowing, worse at L1-L2, L3-L4, and L4-L5 where there is associated vacuum disc phenomenon and Modic type 2 endplate changes. SPINAL CORD: The conus terminates normally. SOFT TISSUES: No paraspinal mass identified. L1-L2: Trace uncovering the disc with superimposed disc osteophyte complex. Mild/moderate thecal sac stenosis. Mild bilateral foraminal narrowing. L2-L3: Disc bulge eccentric to the left. Mild thecal sac stenosis. Mild left foraminal narrowing. L3-L4: Disc osteophyte complex. Moderate thecal sac stenosis. Moderate right and mild/moderate left foraminal narrowing. Significant interval progression. L4-L5: Uncovering the disc with superimposed disc osteophyte complex eccentric to the left. Facet hypertrophy. No significant thecal sac stenosis. Moderate right and severe left foraminal narrowing L5-S1: No significant thecal sac stenosis or foraminal narrowing. 1. Multilevel lumbar spondylosis, most notable at L4-L5 (moderate right and severe left foraminal narrowing). Overall interval progression compared to 2016. 2. Chronic T12 wedge compression fracture deformity. Present in 2016. FLUORO FOR SURGICAL PROCEDURES    Result Date: 12/20/2021  Radiology exam is complete. No Radiologist dictation. Please follow up with ordering provider.        Assessment :      Primary Problem  Symptomatic anemia    Active Hospital Problems    Diagnosis Date Noted    Low hemoglobin [D64.9] 12/20/2021    Symptomatic anemia, microcytic, acute [D64.9] 12/20/2021    Hypotension [I95.9] 12/20/2021    Former smoker, 50+ pack years, quit 2016 [facility]HE[facility] LP 12/20/2021  HLD (hyperlipidemia) [E78.5] 12/20/2021    Spinal stenosis of lumbar region with neurogenic claudication [M48.062] 12/14/2021    Anxiety [F41.9] 02/23/2021    Hepatitis C virus infection resolved after antiviral drug therapy [Z86.19] 12/23/2020    Hypokalemia [E87.6] 02/04/2019    Depression [F32.A] 10/13/2016    Hepatic cirrhosis ([facility] [K74.60] 09/15/2016    Hyponatremia [E87.1] 07/20/2016    Hearing difficulty [H91.90] 04/19/2012       Plan:     Patient status Admit as inpatient in the  Progressive Unit/Step down    Microcytic anemia, symptomatic, cause unspecified  Hemoglobin 4.2 with MCV 66 on presentation  Baseline Hgb of 12.4 on 9/2021  Ddx: includes GI bleed, malignancy  Sx:   · Fatigue, lightheadedness, SOB, diffuse weakness, and poor appetite with little to no p.o. intake x1 month. · Denies blood in stool, hematuria, hemoptysis, & hematemesis  Relevant Hx:  · Esophageal adenocarcinoma T0 N0 M0 Stag II (diagnosed 2/2021) s/p chemoradiation   · liver cirrhosis, hep C, previous tobacco abuse with 50+ pack year hx, portal HTN, esophageal varices, steinberg's esophagus w high grade dysplasia s/p esophageal CA tx  No overt signs of bleeding on exam  Workup:  · Iron, TIBC, ferritin, haptoglobin, reticulocytes, blood smear  · FOBT & UA to rule out blood in stool/hematuria  1 unit PRBC transfused in ED  H&H every 4 hours, daily CBC  Protonix  IVF  Peripheral IV access x2 sites  Consult GI  Consult heme/onc    Hypotension  /46 on presentation  Responsive to IV fluids; BP now in 120s/60s  Continue NS at 100 ml/h    Rule out CHF  Despite hx of portal HTN in setting of chronic cirrhosis, pt reports b/l leg edema is new since 2-3 days ago   ProBNP 602, baseline not known  Echo ordered  Daily weights  I&O's    Electrolyte imbalance  K3.4 on admission => place with 30 mEq IV potassium  Na 133 on admission => IVF rate decreased but continued d/t low BP; will monitor Na in a.m.   Daily BMP    Comorbid Conditions  · Lumbago 2/2 spinal stenosis: follows with OP pain management; Percocet 5-325 mg 1 tablet p.o. daily as needed (home dose), PT/OT while inpatient  · Liver cirrhosis in setting of hep C s/p tx & prior alcohol abuse: Spironolactone 50 mg p.o. daily (home dose)  · HLD: Atorvastatin 20 mg p.o. nightly (home dose)  · Depression: Fluoxetine 20 mg p.o. daily (home dose)  · Anxiety: Hydroxyzine 25 mg p.o. 3 times daily as needed (home dose)  · Hx of Vit D deficiency: was on supplementation until Rx ; will recheck levels    Code: Full  DVT prophylaxis: SCDs, no AC 2/2 anemia  GI prophylaxis: Protonix  Diet: N.p.o. for possible GI scopes  Activity: Up as tolerated, fall precautions    Please note: Use of a speech recognition software was used in the creation of portions of this note and dictation errors, including those of syntax and sound alike word substitutions, may have escaped proofreading. Consultations:   IP CONSULT TO INTERNAL MEDICINE  IP CONSULT TO GI  IP CONSULT TO HEM/ONC  IP CONSULT TO PRIMARY CARE PROVIDER    Patient is admitted as inpatient status because of co-morbiditieslisted above, severity of signs and symptoms as outlined, requirement for current medical therapies and most importantly because of direct risk to patient if care not provided in a hospital setting.     Vilma Hoffmann DO  2021  9:39 PM    Copy sent to VASU Herring

## 2021-12-20 NOTE — PROGRESS NOTES
[facility] PRIMARY CARE  [id number]  [facility] [address]  Dept: [address] Juan Diego Kaufman is a 57 y.o. male Established patient, who presents today for his medical conditions/complaints as noted below. Chief Complaint   Patient presents with    Leg Swelling     Both     Arm Swelling     Left arm        HPI:     HPI: The patient is taking spironolactone. He was seeing cancer specialist Dr. Kimberley Antunez for his cancer. Seeing Dr. Estela Huston however this is clear. Had a procedure on back and said he looked pale.      Reviewed prior notes None  Reviewed previous Labs    LDL Cholesterol (mg/dL)   Date Value   08/19/2021 46   07/03/2020 103   02/02/2019 129       (goal LDL is <100)   AST (U/L)   Date Value   08/19/2021 39   08/19/2021 39     ALT (U/L)   Date Value   08/19/2021 10   08/19/2021 10     BUN (mg/dL)   Date Value   10/20/2021 4 (L)     Hemoglobin A1C (%)   Date Value   08/19/2021 4.2     TSH (mIU/L)   Date Value   07/03/2020 3.35     BP Readings from Last 3 Encounters:   12/20/21 118/60   12/20/21 (!) 115/58   12/14/21 139/70          (goal 120/80)    Past Medical History:   Diagnosis Date    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 Robb Cutler, orthopedic, every 3-4 months, gets steroid injection    Lezama esophagus     BPH (benign prostatic hypertrophy)     Cholelithiasis     Cirrhosis ([address]     COVID-19 12/2020    pt reports he had a positive test while at [facility] in 2020, was asymptomatic    COVID-19 vaccine series completed 5/20/2021, 6/22/2021    Moderna 5/20/2021, 6/22/2021    DDD (degenerative disc disease), lumbar     Depression     Esophageal cancer ([address]     INVASIVE ADENOCARCINOMA ARISING IN TUBULAR ADENOMA WITH HIGH GRADE DYSPLASIA, ASSOCIATED WITH FOCAL INTESTINAL METAPLASIA     Esophageal varices ([address]     Fatty liver     GERD (gastroesophageal reflux disease)     [facility]o coma[facility] chronic [facility]     History of alcohol abuse     6-12 beers a day; quit drinking July 2016    History of blood transfusion     History of colon polyps 2016    History of tobacco abuse     [place] (hard of hearing)     Hyperlipidemia     Hypertension     Port-A-Cath in place     right upper chest    Stomach ulcer     hx of    Tubular adenoma of colon 2016, 2018    Vitamin D deficiency     Wears glasses       Past Surgical History:   Procedure Laterality Date    BUNIONECTOMY      twice on right side    BUNIONECTOMY Left     CARPAL TUNNEL RELEASE Right     COLONOSCOPY      at age 35    COLONOSCOPY  10/05/2016    polyps-pathology tubular adenoma, and abnormal looking mucosa right colon-pathology-tubular adenoma    COLONOSCOPY N/A 3/30/2018    COLONOSCOPY POLYPECTOMY COLD BIOPSY performed by Munira Kay MD at [place] [address]  03/30/2018    Small polyp in the sigmoid colon and excised with biopsy forceps--tubular adenoma    ENDOSCOPY, COLON, DIAGNOSTIC      EGD    IR PORT PLACEMENT EQUAL OR GREATER THAN 5 YEARS  4/19/2021    IR PORT PLACEMENT EQUAL OR GREATER THAN 5 YEARS 4/19/2021 [facility] SPECIAL PROCEDURES    KNEE SURGERY Left     cyst removed    NASAL SEPTUM SURGERY      NERVE BLOCK Right 11/23/2020    NERVE BLOCK RIGHT CERVICAL STEROID INJECTION  C3-C6 performed by Rosa Beal MD at [address] [place] [address] 54  01/04/16    steroid injection C7 T1    OTHER SURGICAL HISTORY  11/21/2016    Bilateral Lumbar CACHORRO L4-L5 injections    OTHER SURGICAL HISTORY  12/19/2016    lumbar steroid injection    OTHER SURGICAL HISTORY  09/28/2018    BILATERAL L5 CACHORRO (N/A Back)    OTHER SURGICAL HISTORY Right 11/23/2020    cervical injection    PAIN MANAGEMENT PROCEDURE Left 7/9/2020    EPIDURAL STEROID INJECTION LEFT L4 L5 performed by Rosa Beal MD at [address] [place] [address] Left 7/20/2020    LEFT L4 L5 EPIDURAL STEROID INJECTION performed by Rosa [facility]al MD at [facility] OR    PAIN MANAGEMENT PROCEDURE Bilateral 2020    LUMBAR FACET BILATERAL L2-L5 performed by Rich Nichols MD at [facility] [address] Bilateral 2020    NERVE BLOCK BILATERAL LUMBAR MEDIAL BRANCH L2-L5 performed by Rich Nichols MD at [address] AA&/STRD TFRML EPI LUMBAR/SACRAL 1 LEVEL Bilateral 2018    BILATERAL L5 CACHORRO performed by Rich Nichols MD at [address] AA&/STRD TFRML EPI LUMBAR/SACRAL 1 LEVEL N/A 2018    BILATERAL L5 CACHORRO performed by Rich Nichols MD at [address] [facility] N/CARPAL TUNNEL SURG Right 2017    CARPAL TUNNEL RELEASE RIGHT performed by Veronica Grimes MD at [address] [facility] N/CARPAL TUNNEL SURG Left 10/31/2017    CARPAL TUNNEL RELEASE performed by Veronica Grimes MD at [address] [facility] N/A 2020    EGD BIOPSY performed by Mandi Brooks MD at [place] [address] N/A 2021    EGD BIOPSY and spot marking performed by Tricia Haddad MD at [place] [address] N/A 2021    ENDOSCOPIC ULTRASOUND, EGD performed by Tierney Archer MD at [address] [place] [address]  2021    EGD DIAGNOSTIC ONLY performed by Tierney Archer MD at [address] [place] [address] N/A 2021    EGD BIOPSY performed by Tricia Haddad MD at [facility]         Family History   Problem Relation Age of Onset    Cancer Mother         pancreatic    Cancer Father         bone    Diabetes Sister     Asthma Brother        Social History     Tobacco Use    Smoking status: Former Smoker     Packs/day: 1.00     Years: 45.00     Pack years: 45.00     Quit date: 2017     Years since quittin.9    Smokeless tobacco: Never Used   Substance Use Topics    Alcohol use: Not Currently     Comment: quit 2019      Current Outpatient Medications   Medication Sig Dispense Refill    oxyCODONE-acetaminophen (PERCOCET) 5-325 MG per tablet Take 1 tablet by mouth daily as needed for Pain for up to 30 days. 30 tablet 0    Handicap Placard MISC by Does not apply route 1 each 0    omeprazole (PRILOSEC) 40 MG delayed release capsule take 1 capsule by mouth EVERY MORNING BEFORE BREAKFAST 30 capsule 2    FLUoxetine (PROZAC) 20 MG capsule take 1 capsule by mouth once daily 30 capsule 3    VITAMIN D, CHOLECALCIFEROL, PO Take by mouth Takes an OTC dose, unsure of dosage      lidocaine-prilocaine (EMLA) 2.5-2.5 % cream Apply topically 45-60 mins prior to needle poke daily PRN 1 Tube 2    ondansetron (ZOFRAN-ODT) 8 MG TBDP disintegrating tablet Place 1 tablet under the tongue 3 times daily as needed for Nausea or Vomiting 90 tablet 2    D3-50 1.25 MG (76069 UT) CAPS once a week On Tuesdays      hydrOXYzine (VISTARIL) 25 MG capsule take 1 capsule by mouth three times a day if needed for anxiety 90 capsule 2    atorvastatin (LIPITOR) 20 MG tablet take 1 tablet by mouth at bedtime 90 tablet 1    spironolactone (ALDACTONE) 50 MG tablet take 1 tablet by mouth once daily 90 tablet 3    vitamin D (ERGOCALCIFEROL) 1.25 MG (19257 UT) CAPS capsule take 1 capsule by mouth every week 12 capsule 1     No current facility-administered medications for this visit.      Allergies   Allergen Reactions    Bee Pollen Other (See Comments)     Runny nose, watery eyes    Pollen Extract      Runny nose, watery eyes       Health Maintenance   Topic Date Due    COVID-19 Vaccine (1) Never done    Pneumococcal 0-64 years Vaccine (3 of 4 - PCV13) 07/25/2017    Flu vaccine (1) 09/01/2021    Shingles Vaccine (1 of 2) 02/23/2022 (Originally 8/15/2009)    Annual Wellness Visit (AWV)  03/12/2022    Low dose CT lung screening  07/21/2022    Lipid screen  08/19/2022    Potassium monitoring  08/19/2022    Creatinine monitoring  10/20/2022    Colon cancer screen colonoscopy  03/30/2023    DTaP/Tdap/Td vaccine (2 - Td or Tdap) 04/06/2027    Hepatitis A vaccine  Completed    Hepatitis B vaccine  Completed    Hib vaccine  Aged Out    Meningococcal (ACWY) vaccine  Aged Out    HIV screen  Discontinued       Subjective:      Review of Systems   Constitutional: Negative for chills, fever and unexpected weight change. HENT: Negative for congestion, hearing loss, rhinorrhea, sinus pressure and sore throat. Eyes: Negative for photophobia, discharge and visual disturbance. Respiratory: Positive for shortness of breath. Negative for cough and chest tightness. Cardiovascular: Positive for leg swelling. Negative for chest pain and palpitations. Gastrointestinal: Negative for abdominal distention, abdominal pain, constipation, diarrhea and vomiting. Endocrine: Negative for polydipsia and polyuria. Genitourinary: Negative for decreased urine volume, difficulty urinating, frequency and urgency. Musculoskeletal: Negative for arthralgias, gait problem and myalgias. Skin: Negative for rash. Allergic/Immunologic: Negative for food allergies. Neurological: Negative for dizziness, weakness, numbness and headaches. Hematological: Negative for adenopathy. Psychiatric/Behavioral: Negative for dysphoric mood and sleep disturbance. The patient is not nervous/anxious. Objective:     /60   Pulse 90   Ht 5' 10\" (1.778 m)   Wt 221 lb 9.6 oz (100.5 kg)   SpO2 99%   BMI 31.80 kg/m²   Physical Exam  Constitutional:       General: He is not in acute distress. Appearance: Normal appearance. He is not ill-appearing. HENT:      Head: Normocephalic and atraumatic. Right Ear: External ear normal.      Left Ear: External ear normal.      Nose: Nose normal.      Mouth/Throat:      Mouth: Mucous membranes are moist.   Eyes:      Extraocular Movements: Extraocular movements intact. Conjunctiva/sclera: Conjunctivae normal.      Pupils: Pupils are equal, round, and reactive to light.    Neck: Vascular: No carotid bruit. Cardiovascular:      Rate and Rhythm: Normal rate and regular rhythm. Pulses: Normal pulses. Heart sounds: Normal heart sounds. Pulmonary:      Effort: Pulmonary effort is normal. No respiratory distress. Breath sounds: Normal breath sounds. Abdominal:      General: Bowel sounds are normal. There is no distension. Tenderness: There is no abdominal tenderness. Musculoskeletal:         General: Swelling present. Normal range of motion. Cervical back: Normal range of motion and neck supple. Right lower leg: Edema present. Left lower leg: Edema present. Lymphadenopathy:      Cervical: No cervical adenopathy. Skin:     General: Skin is warm and dry. Neurological:      General: No focal deficit present. Mental Status: He is alert and oriented to person, place, and time. Psychiatric:         Mood and Affect: Mood normal.         Behavior: Behavior normal.         Thought Content: Thought content normal.         Assessment and Plan:          1. Jaundice  -     Brain Natriuretic Peptide; Future  -     Comprehensive Metabolic Panel; Future  -     CBC Auto Differential; Future  2. Hep C w/o coma, chronic (HCC)  -     Brain Natriuretic Peptide; Future  -     Comprehensive Metabolic Panel; Future  -     CBC Auto Differential; Future  3. Alcoholic cirrhosis, unspecified whether ascites present ([address]  -     Brain Natriuretic Peptide; Future  -     Comprehensive Metabolic Panel; Future  -     CBC Auto Differential; Future  4. 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 Thrombocytopenia ([address]  -     Brain Natriuretic Peptide; Future  -     Comprehensive Metabolic Panel; Future  -     CBC Auto Differential; Future  6.  Edema of left upper arm  -     US DUP UPPER EXTREMITY LEFT VENOUS; Future  -     Echocardiogram

## 2021-12-20 NOTE — ED PROVIDER NOTES
[address] ED  EMERGENCY DEPARTMENT ENCOUNTER      Pt Name: China Moody  MRN: [id number]  [place] 1960  Date of evaluation: 12/20/21    CHIEF COMPLAINT     Generalized weakness and fatigue    HISTORY OF PRESENT ILLNESS   HPI 57 y.o. male presents with c/o generalized weakness and fatigue and anemia. The patient's been feeling particularly weak and fatigued over the last month. He says that about a year ago he was diagnosed with esophageal cancer, he was treated with chemo and radiation, so really he has been feeling tired for over a year, but the symptoms of been most severe in the last month. He denies any blood in the stool. No vomiting blood. He has not been eating or drinking very well. He says he just does not have any appetite and nothing sounds good to him. Patient is also noted that he has had swelling to both of his lower legs and his left hand. He had a Doppler of the hand today, I cannot see the results at this time, but he states he was told that the test was negative. REVIEW OF SYSTEMS       Review of Systems   Constitutional: Positive for appetite change and fatigue. HENT: Negative for congestion. Eyes: Negative for visual disturbance. Respiratory: Positive for shortness of breath. Cardiovascular: Negative for chest pain. Gastrointestinal: Negative for abdominal pain, blood in stool, nausea and vomiting. Endocrine: Positive for polydipsia. Genitourinary: Positive for decreased urine volume. Musculoskeletal: Positive for back pain and neck pain. Skin: Negative for rash. Neurological: Positive for weakness (generalized).        PAST MEDICAL HISTORY     Past Medical History:   Diagnosis Date    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 ([facility]     Anxiety     Arthritis     Back pain, chronic     dr. Stephan Helms, orthopedic, every 3-4 months, gets steroid injection    Lezama esophagus     BPH (benign prostatic hypertrophy)     Cholelithiasis     Cirrhosis ([facility]    Ольга Gee COVID-19 12/2020    pt reports he had a positive test while at [facility] in 2020, was asymptomatic    COVID-19 vaccine series completed 5/20/2021, 6/22/2021    Moderna 5/20/2021, 6/22/2021    DDD (degenerative disc disease), lumbar     Depression     Esophageal cancer ([facility]     INVASIVE ADENOCARCINOMA ARISING IN TUBULAR ADENOMA WITH HIGH GRADE DYSPLASIA, ASSOCIATED WITH FOCAL INTESTINAL METAPLASIA     Esophageal varices ([facility]     Fatty liver     GERD (gastroesophageal reflux disease)     Hep C w/o coma, chronic ([facility]     History of alcohol abuse     6-12 beers a day; quit drinking July 2016    History of blood transfusion     History of colon polyps 2016    History of tobacco abuse     [place] (hard of hearing)     Hyperlipidemia     Hypertension     Port-A-Cath in place     right upper chest    Stomach ulcer     hx of    Tubular adenoma of colon 2016, 2018    Vitamin D deficiency     Wears glasses        SURGICAL HISTORY       Past Surgical History:   Procedure Laterality Date    BUNIONECTOMY      twice on right side    BUNIONECTOMY Left     CARPAL TUNNEL RELEASE Right     COLONOSCOPY      at age 35    COLONOSCOPY  10/05/2016    polyps-pathology tubular adenoma, and abnormal looking mucosa right colon-pathology-tubular adenoma    COLONOSCOPY N/A 3/30/2018    COLONOSCOPY POLYPECTOMY COLD BIOPSY performed by Whitley Ricardo MD at [place] [address]  03/30/2018    Small polyp in the sigmoid colon and excised with biopsy forceps--tubular adenoma    ENDOSCOPY, COLON, DIAGNOSTIC      EGD    IR PORT PLACEMENT EQUAL OR GREATER THAN 5 YEARS  4/19/2021    IR PORT PLACEMENT EQUAL OR GREATER THAN 5 YEARS 4/19/2021 [facility] SPECIAL PROCEDURES    KNEE SURGERY Left     cyst removed    NASAL SEPTUM SURGERY      NERVE BLOCK Right 11/23/2020    NERVE BLOCK RIGHT CERVICAL STEROID INJECTION  C3-C6 performed by Chilton Prader, MD at [facility] [employer] [facility]  01/04/16    steroid injection C7 T1    OTHER SURGICAL HISTORY  11/21/2016    Bilateral Lumbar CACHORRO L4-L5 injections    OTHER SURGICAL HISTORY  12/19/2016    lumbar steroid injection    OTHER SURGICAL HISTORY  09/28/2018    BILATERAL L5 CACHORRO (N/A Back)    OTHER SURGICAL HISTORY Right 11/23/2020    cervical injection    PAIN MANAGEMENT PROCEDURE Left 7/9/2020    EPIDURAL STEROID INJECTION LEFT L4 L5 performed by Shante Walton MD at [address] [place] [address] Left 7/20/2020    LEFT L4 L5 EPIDURAL STEROID INJECTION performed by Shante Walton MD at [address] [place] [address] Bilateral 8/17/2020    LUMBAR FACET BILATERAL L2-L5 performed by Shante Walton MD at [address] [place] [address] Bilateral 12/7/2020    NERVE BLOCK BILATERAL LUMBAR MEDIAL BRANCH L2-L5 performed by Shante Walton MD at [address] AA&/STRD TFRML EPI LUMBAR/SACRAL 1 LEVEL Bilateral 9/6/2018    BILATERAL L5 CACHORRO performed by Shante Walton MD at [address] AA&/STRD TFRML EPI LUMBAR/SACRAL 1 LEVEL N/A 9/28/2018    BILATERAL L5 CACHORRO performed by Shante Walton MD at [facility]/CARPAL TUNNEL SURG Right 8/29/2017    CARPAL TUNNEL RELEASE RIGHT performed by Clarissa Biggs MD at [facility]/CARPAL TUNNEL SURG Left 10/31/2017    CARPAL TUNNEL RELEASE performed by Clarissa Biggs MD at [address] [place] [address] 12/29/2020    EGD BIOPSY performed by Zuleyka Shearer MD at [address] [place] [address] 2/2/2021    EGD BIOPSY and spot marking performed by Jian Uribe MD at [address] [place] [address] 2/12/2021    ENDOSCOPIC ULTRASOUND, EGD performed by Shankar Michaels MD at [address] [place] [address]  2/12/2021    EGD DIAGNOSTIC ONLY performed by Shankar Michaels MD at [address] [place] [address] N/A 8/31/2021    EGD BIOPSY performed by Jian Uribe MD at [facility] EXTRACTION         CURRENT MEDICATIONS       Previous Medications    ATORVASTATIN (LIPITOR) 20 MG TABLET    take 1 tablet by mouth at bedtime    FLUOXETINE (PROZAC) 20 MG CAPSULE    take 1 capsule by mouth once daily    HANDICAP PLACARD MISC    by Does not apply route    HYDROXYZINE (VISTARIL) 25 MG CAPSULE    take 1 capsule by mouth three times a day if needed for anxiety    LIDOCAINE-PRILOCAINE (EMLA) 2.5-2.5 % CREAM    Apply topically 45-60 mins prior to needle poke daily PRN    OMEPRAZOLE (PRILOSEC) 40 MG DELAYED RELEASE CAPSULE    take 1 capsule by mouth EVERY MORNING BEFORE BREAKFAST    OXYCODONE-ACETAMINOPHEN (PERCOCET) 5-325 MG PER TABLET    Take 1 tablet by mouth daily as needed for Pain for up to 30 days. SPIRONOLACTONE (ALDACTONE) 50 MG TABLET    take 1 tablet by mouth once daily       ALLERGIES     is allergic to bee pollen and pollen extract. FAMILY HISTORY     He indicated that his mother is . He indicated that his father is . He indicated that both of his sisters are alive. He indicated that his brother is alive. SOCIAL HISTORY      reports that he quit smoking about 4 years ago. He has a 45.00 pack-year smoking history. He has never used smokeless tobacco. He reports previous alcohol use. He reports previous drug use. Frequency: 1.00 time per week. PHYSICAL EXAM     INITIAL VITALS: /68   Pulse 75   Temp 98.2 °F (36.8 °C) (Oral)   Resp 12   Ht 5' 10\" (1.778 m)   Wt 220 lb (99.8 kg)   SpO2 97%   BMI 31.57 kg/m²   Gen: Appears weak and fatigued  Head: Normocephalic, atraumatic  Eye: Pupils equal round reactive to light, no conjunctivitis, pale conjunctiva  ENT: Oral mucosa is moist  Heart: Regular rate and rhythm no murmurs  Lungs: Clear to auscultation bilaterally, no respiratory distress  Chest wall: No crepitus, no tenderness palpation  Abdomen: Soft, nontender, nondistended, with no peritoneal signs  Skin: No rash.   Skin turgor is normal  Neurologic: Patient is alert and oriented x3, Pt is able to move all extremities symmetrically but is generally weak, speech is fluent. Cranial nerves II - XII are appropriate. Extremities: There is some edema to the left hand and also equal edema bilateral lower extremities    MEDICAL DECISION MAKING:     MDM  57 y.o. male presenting with generalized weakness. Patient has profound anemia. Given the low MCV I am thinking that he has profound iron deficiency. I did send off some iron studies. Discussed the risk and benefits of a blood transfusion, he is agreeable to proceed. Patient will need admission to the hospital for further evaluation of his anemia. The patient is in agreement with the plan. We have a page out to Dr. Rosie Garner. Emergency Department course:  Discussed with internal medicine service. Pt being admitted to hospital.         DIAGNOSTIC RESULTS     LABS: All lab results were reviewed by myself, and all abnormals are listed below.   Labs Reviewed   OCCULT BLOOD SCREEN   IRON AND TIBC   FERRITIN   HAPTOGLOBIN   HEMOGLOBIN AND HEMATOCRIT, BLOOD   HEMOGLOBIN AND HEMATOCRIT, BLOOD   HEMOGLOBIN AND HEMATOCRIT, BLOOD   HEMOGLOBIN AND HEMATOCRIT, BLOOD   IRON AND TIBC   BASIC METABOLIC PANEL W/ REFLEX TO MG FOR LOW K   TROPONIN   TROPONIN   VITAMIN D 25 HYDROXY   TYPE AND SCREEN   PREPARE RBC (CROSSMATCH)       EMERGENCY DEPARTMENT COURSE:   Vitals:    Vitals:    12/20/21 1933 12/20/21 2015 12/20/21 2020 12/20/21 2030   BP:  131/67  125/68   Pulse: 74 82 71 75   Resp: 15 18 14 12   Temp:    98.2 °F (36.8 °C)   TempSrc:    Oral   SpO2: 99% 99% 97% 97%   Weight:       Height:           The patient was given the following medications while in the emergency department:  Orders Placed This Encounter   Medications    0.9 % sodium chloride infusion    biotene dry mouth moisturizing liquid    atorvastatin (LIPITOR) tablet 20 mg    FLUoxetine (PROZAC) capsule 20 mg    spironolactone (ALDACTONE) tablet 50 mg  0.9 % sodium chloride infusion    sodium chloride flush 0.9 % injection 5-40 mL    sodium chloride flush 0.9 % injection 5-40 mL    0.9 % sodium chloride infusion    OR Linked Order Group     ondansetron (ZOFRAN-ODT) disintegrating tablet 4 mg     ondansetron (ZOFRAN) injection 4 mg    OR Linked Order Group     acetaminophen (TYLENOL) tablet 650 mg     acetaminophen (TYLENOL) suppository 650 mg    pantoprazole (PROTONIX) 80 mg in sodium chloride 0.9 % 50 mL bolus    pantoprazole (PROTONIX) 80 mg in sodium chloride 0.9 % 100 mL infusion    DISCONTD: pantoprazole (PROTONIX) injection 40 mg    DISCONTD: sodium chloride (PF) 0.9 % injection 10 mL    octreotide (SANDOSTATIN) injection 50 mcg    hydrOXYzine (ATARAX) tablet 25 mg    DISCONTD: pantoprazole (PROTONIX) tablet 40 mg    oxyCODONE-acetaminophen (PERCOCET) 5-325 MG per tablet 1 tablet     -------------------------  CRITICAL CARE:   CONSULTS: IP CONSULT TO INTERNAL MEDICINE  IP CONSULT TO GI  IP CONSULT TO HEM/ONC  PROCEDURES: Procedures     FINAL IMPRESSION      1. Symptomatic anemia          DISPOSITION/PLAN   DISPOSITION Admitted 12/20/2021 06:20:48 PM      PATIENT REFERRED TO:  No follow-up provider specified.     DISCHARGE MEDICATIONS:  New Prescriptions    No medications on file         Niharika Field MD  Attending Emergency Physician       Niharika Field MD  12/20/21 2059

## 2021-12-20 NOTE — Clinical Note
Patient Class: Inpatient [101]   REQUIRED: Diagnosis: Low hemoglobin [960514]   Estimated Length of Stay: Estimated stay of more than 2 midnights

## 2021-12-20 NOTE — H&P
UPDATE:  Office visit pain clinic in [facility] with all required elements of H&P dated 12/14/2021  Patient seen preop, chart reviewed, AAO x 3, in NAD, VSS,. No changes in medical history, physical exam and health assessment since last evaluation. Risk / Benefits explained to patient, patient agree to proceed with plan.   ASA 3  MP 3

## 2021-12-21 ENCOUNTER — TELEPHONE (OUTPATIENT)
Dept: PAIN MANAGEMENT | Age: 61
End: 2021-12-21

## 2021-12-21 ENCOUNTER — APPOINTMENT (OUTPATIENT)
Dept: NON INVASIVE DIAGNOSTICS | Age: 61
DRG: 812 | End: 2021-12-21
Payer: MEDICARE

## 2021-12-21 ENCOUNTER — APPOINTMENT (OUTPATIENT)
Dept: GENERAL RADIOLOGY | Age: 61
DRG: 812 | End: 2021-12-21
Payer: MEDICARE

## 2021-12-21 LAB
-: NORMAL
-: NORMAL
AMORPHOUS: NORMAL
ANION GAP SERPL CALCULATED.3IONS-SCNC: 10 MMOL/L (ref 9–17)
BACTERIA: NORMAL
BILIRUBIN URINE: NEGATIVE
BUN BLDV-MCNC: 4 MG/DL (ref 8–23)
BUN/CREAT BLD: ABNORMAL (ref 9–20)
CALCIUM SERPL-MCNC: 8.6 MG/DL (ref 8.6–10.4)
CASTS UA: NORMAL /LPF
CHLORIDE BLD-SCNC: 104 MMOL/L (ref 98–107)
CO2: 23 MMOL/L (ref 20–31)
COLOR: YELLOW
COMMENT UA: ABNORMAL
CREAT SERPL-MCNC: 0.43 MG/DL (ref 0.7–1.2)
CRYSTALS, UA: NORMAL /HPF
EPITHELIAL CELLS UA: NORMAL /HPF
FERRITIN: 14 UG/L (ref 30–400)
GFR AFRICAN AMERICAN: >60 ML/MIN
GFR NON-AFRICAN AMERICAN: >60 ML/MIN
GFR SERPL CREATININE-BSD FRML MDRD: ABNORMAL ML/MIN/{1.73_M2}
GFR SERPL CREATININE-BSD FRML MDRD: ABNORMAL ML/MIN/{1.73_M2}
GLUCOSE BLD-MCNC: 166 MG/DL (ref 70–99)
GLUCOSE URINE: NEGATIVE
HAPTOGLOBIN: 92 MG/DL (ref 30–200)
HCT VFR BLD CALC: 19.4 % (ref 41–53)
HCT VFR BLD CALC: 19.8 % (ref 41–53)
HCT VFR BLD CALC: 19.9 % (ref 41–53)
HCT VFR BLD CALC: 22.8 % (ref 41–53)
HEMOGLOBIN: 5.8 G/DL (ref 13.5–17.5)
HEMOGLOBIN: 6 G/DL (ref 13.5–17.5)
HEMOGLOBIN: 6 G/DL (ref 13.5–17.5)
HEMOGLOBIN: 6.9 G/DL (ref 13.5–17.5)
IRON SATURATION: 16 % (ref 20–55)
IRON SATURATION: 7 % (ref 20–55)
IRON: 24 UG/DL (ref 59–158)
IRON: 51 UG/DL (ref 59–158)
KETONES, URINE: NEGATIVE
LEUKOCYTE ESTERASE, URINE: NEGATIVE
LV EF: 63 %
LVEF MODALITY: NORMAL
MAGNESIUM: 1.7 MG/DL (ref 1.6–2.6)
MUCUS: NORMAL
NITRITE, URINE: NEGATIVE
OTHER OBSERVATIONS UA: NORMAL
PH UA: 6 (ref 5–8)
POTASSIUM SERPL-SCNC: 3.6 MMOL/L (ref 3.7–5.3)
PROTEIN UA: NEGATIVE
RBC UA: NORMAL /HPF
REASON FOR REJECTION: NORMAL
RENAL EPITHELIAL, UA: NORMAL /HPF
SODIUM BLD-SCNC: 137 MMOL/L (ref 135–144)
SPECIFIC GRAVITY UA: 1.01 (ref 1–1.03)
SURGICAL PATHOLOGY REPORT: NORMAL
TOTAL IRON BINDING CAPACITY: 314 UG/DL (ref 250–450)
TOTAL IRON BINDING CAPACITY: 344 UG/DL (ref 250–450)
TRICHOMONAS: NORMAL
TROPONIN INTERP: NORMAL
TROPONIN T: NORMAL NG/ML
TROPONIN, HIGH SENSITIVITY: 8 NG/L (ref 0–22)
TURBIDITY: CLEAR
UNSATURATED IRON BINDING CAPACITY: 263 UG/DL (ref 112–347)
UNSATURATED IRON BINDING CAPACITY: 320 UG/DL (ref 112–347)
URINE HGB: NEGATIVE
UROBILINOGEN, URINE: ABNORMAL
VITAMIN D 25-HYDROXY: 27.7 NG/ML (ref 30–100)
WBC UA: NORMAL /HPF
YEAST: NORMAL
ZZ NTE CLEAN UP: ORDERED TEST: NORMAL
ZZ NTE WITH NAME CLEAN UP: SPECIMEN SOURCE: NORMAL

## 2021-12-21 PROCEDURE — 6360000002 HC RX W HCPCS: Performed by: NURSE PRACTITIONER

## 2021-12-21 PROCEDURE — P9016 RBC LEUKOCYTES REDUCED: HCPCS

## 2021-12-21 PROCEDURE — 6370000000 HC RX 637 (ALT 250 FOR IP)

## 2021-12-21 PROCEDURE — 36415 COLL VENOUS BLD VENIPUNCTURE: CPT

## 2021-12-21 PROCEDURE — 2580000003 HC RX 258: Performed by: NURSE PRACTITIONER

## 2021-12-21 PROCEDURE — 85018 HEMOGLOBIN: CPT

## 2021-12-21 PROCEDURE — 2060000000 HC ICU INTERMEDIATE R&B

## 2021-12-21 PROCEDURE — 83540 ASSAY OF IRON: CPT

## 2021-12-21 PROCEDURE — 2580000003 HC RX 258

## 2021-12-21 PROCEDURE — 99223 1ST HOSP IP/OBS HIGH 75: CPT | Performed by: INTERNAL MEDICINE

## 2021-12-21 PROCEDURE — 6370000000 HC RX 637 (ALT 250 FOR IP): Performed by: FAMILY MEDICINE

## 2021-12-21 PROCEDURE — 2580000003 HC RX 258: Performed by: STUDENT IN AN ORGANIZED HEALTH CARE EDUCATION/TRAINING PROGRAM

## 2021-12-21 PROCEDURE — 2580000003 HC RX 258: Performed by: INTERNAL MEDICINE

## 2021-12-21 PROCEDURE — 6370000000 HC RX 637 (ALT 250 FOR IP): Performed by: STUDENT IN AN ORGANIZED HEALTH CARE EDUCATION/TRAINING PROGRAM

## 2021-12-21 PROCEDURE — 93306 TTE W/DOPPLER COMPLETE: CPT

## 2021-12-21 PROCEDURE — 6360000002 HC RX W HCPCS: Performed by: INTERNAL MEDICINE

## 2021-12-21 PROCEDURE — 99222 1ST HOSP IP/OBS MODERATE 55: CPT | Performed by: FAMILY MEDICINE

## 2021-12-21 PROCEDURE — C9113 INJ PANTOPRAZOLE SODIUM, VIA: HCPCS | Performed by: STUDENT IN AN ORGANIZED HEALTH CARE EDUCATION/TRAINING PROGRAM

## 2021-12-21 PROCEDURE — 83550 IRON BINDING TEST: CPT

## 2021-12-21 PROCEDURE — 6360000002 HC RX W HCPCS: Performed by: STUDENT IN AN ORGANIZED HEALTH CARE EDUCATION/TRAINING PROGRAM

## 2021-12-21 PROCEDURE — 85014 HEMATOCRIT: CPT

## 2021-12-21 PROCEDURE — 71045 X-RAY EXAM CHEST 1 VIEW: CPT

## 2021-12-21 PROCEDURE — 36430 TRANSFUSION BLD/BLD COMPNT: CPT

## 2021-12-21 PROCEDURE — 80048 BASIC METABOLIC PNL TOTAL CA: CPT

## 2021-12-21 PROCEDURE — 86900 BLOOD TYPING SEROLOGIC ABO: CPT

## 2021-12-21 PROCEDURE — 81001 URINALYSIS AUTO W/SCOPE: CPT

## 2021-12-21 PROCEDURE — 83735 ASSAY OF MAGNESIUM: CPT

## 2021-12-21 PROCEDURE — 97162 PT EVAL MOD COMPLEX 30 MIN: CPT

## 2021-12-21 PROCEDURE — 97166 OT EVAL MOD COMPLEX 45 MIN: CPT

## 2021-12-21 PROCEDURE — 84484 ASSAY OF TROPONIN QUANT: CPT

## 2021-12-21 RX ORDER — OXYCODONE HYDROCHLORIDE AND ACETAMINOPHEN 5; 325 MG/1; MG/1
1 TABLET ORAL EVERY 6 HOURS PRN
Status: DISCONTINUED | OUTPATIENT
Start: 2021-12-21 | End: 2021-12-23 | Stop reason: HOSPADM

## 2021-12-21 RX ORDER — SODIUM CHLORIDE 9 MG/ML
INJECTION, SOLUTION INTRAVENOUS PRN
Status: DISCONTINUED | OUTPATIENT
Start: 2021-12-21 | End: 2021-12-23 | Stop reason: HOSPADM

## 2021-12-21 RX ADMIN — IRON SUCROSE 300 MG: 20 INJECTION, SOLUTION INTRAVENOUS at 20:22

## 2021-12-21 RX ADMIN — ATORVASTATIN CALCIUM 20 MG: 20 TABLET, FILM COATED ORAL at 20:17

## 2021-12-21 RX ADMIN — SPIRONOLACTONE 50 MG: 25 TABLET, FILM COATED ORAL at 09:17

## 2021-12-21 RX ADMIN — OCTREOTIDE ACETATE 25 MCG/HR: 500 INJECTION, SOLUTION INTRAVENOUS; SUBCUTANEOUS at 03:46

## 2021-12-21 RX ADMIN — FLUOXETINE HYDROCHLORIDE 20 MG: 20 CAPSULE ORAL at 09:17

## 2021-12-21 RX ADMIN — Medication 10 ML: at 20:18

## 2021-12-21 RX ADMIN — PANTOPRAZOLE SODIUM 8 MG/HR: 40 INJECTION, POWDER, FOR SOLUTION INTRAVENOUS at 20:41

## 2021-12-21 RX ADMIN — OXYCODONE HYDROCHLORIDE AND ACETAMINOPHEN 1 TABLET: 5; 325 TABLET ORAL at 20:18

## 2021-12-21 RX ADMIN — SODIUM CHLORIDE: 9 INJECTION, SOLUTION INTRAVENOUS at 17:19

## 2021-12-21 RX ADMIN — PANTOPRAZOLE SODIUM 8 MG/HR: 40 INJECTION, POWDER, FOR SOLUTION INTRAVENOUS at 09:17

## 2021-12-21 RX ADMIN — OXYCODONE HYDROCHLORIDE AND ACETAMINOPHEN 1 TABLET: 5; 325 TABLET ORAL at 10:31

## 2021-12-21 RX ADMIN — OCTREOTIDE ACETATE 25 MCG/HR: 500 INJECTION, SOLUTION INTRAVENOUS; SUBCUTANEOUS at 17:18

## 2021-12-21 ASSESSMENT — PAIN - FUNCTIONAL ASSESSMENT: PAIN_FUNCTIONAL_ASSESSMENT: PREVENTS OR INTERFERES SOME ACTIVE ACTIVITIES AND ADLS

## 2021-12-21 ASSESSMENT — PAIN DESCRIPTION - ORIENTATION
ORIENTATION: LOWER
ORIENTATION: LOWER

## 2021-12-21 ASSESSMENT — PAIN DESCRIPTION - LOCATION
LOCATION: BACK
LOCATION: BACK

## 2021-12-21 ASSESSMENT — PAIN SCALES - GENERAL
PAINLEVEL_OUTOF10: 8
PAINLEVEL_OUTOF10: 5
PAINLEVEL_OUTOF10: 5
PAINLEVEL_OUTOF10: 8

## 2021-12-21 ASSESSMENT — PAIN DESCRIPTION - FREQUENCY
FREQUENCY: CONTINUOUS
FREQUENCY: CONTINUOUS

## 2021-12-21 ASSESSMENT — PAIN DESCRIPTION - ONSET: ONSET: ON-GOING

## 2021-12-21 ASSESSMENT — PAIN DESCRIPTION - PAIN TYPE
TYPE: CHRONIC PAIN
TYPE: CHRONIC PAIN

## 2021-12-21 ASSESSMENT — PAIN DESCRIPTION - DESCRIPTORS
DESCRIPTORS: DISCOMFORT;SORE
DESCRIPTORS: DISCOMFORT;SORE

## 2021-12-21 ASSESSMENT — PAIN DESCRIPTION - PROGRESSION: CLINICAL_PROGRESSION: GRADUALLY WORSENING

## 2021-12-21 NOTE — PROGRESS NOTES
Physical Therapy    Facility/Department: [facility] PROGRESSIVE CARE  Initial Assessment    NAME: Toney Mccain  : 1960  MRN: [id number]    Date of Service: 2021    Discharge Recommendations:  Patient would benefit from continued therapy after discharge   PT Equipment Recommendations  Equipment Needed: No    Assessment   Body structures, Functions, Activity limitations: Decreased functional mobility ; Decreased ADL status; Decreased strength;Decreased safe awareness;Decreased balance; Increased pain;Decreased posture  Assessment: Pt most limited by back pain and low hemoglobin this date. Pt also reports multiple falls and may benefit from continued PT. Treatment Diagnosis: Impaired functional mobility 2* pain  Specific instructions for Next Treatment: check Hgb, progress gait, stairs, HEP  Prognosis: Good  Decision Making: Medium Complexity  Exam: ROM, MMT, bed mobility, transfers, amb  Clinical Presentation: Pt alert, cooperative  Barriers to Learning: none  REQUIRES PT FOLLOW UP: Yes  Activity Tolerance  Activity Tolerance: Patient Tolerated treatment well;Treatment limited secondary to medical complications (free text) (low Hgb)       Patient Diagnosis(es): The encounter diagnosis was Symptomatic anemia. 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 ([facility] [address] Anxiety, Arthritis, Back pain, chronic, [place] esophagus, BPH (benign prostatic hypertrophy), Cholelithiasis, Cirrhosis ([address] COVID-19, COVID-19 vaccine series completed, DDD (degenerative disc disease), lumbar, Depression, Esophageal cancer ([address] Esophageal varices ([facility] [address] Fatty liver, GERD (gastroesophageal reflux disease), Hep C w/o coma, chronic ([address] History of alcohol abuse, History of blood transfusion, History of colon polyps, History of tobacco abuse, [place] (hard of hearing), Hyperlipidemia, Hypertension, Port-A-Cath in place, Stomach ulcer, Tubular adenoma of colon, Vitamin D deficiency, and Wears glasses.    has a past surgical history that includes Bunionectomy; Nasal septum surgery; other surgical history (01/04/16); Colonoscopy; Colonoscopy (10/05/2016); other surgical history (11/21/2016); other surgical history (12/19/2016); knee surgery (Left); Bunionectomy (Left); Endoscopy, colon, diagnostic; pr revise median n/carpal tunnel surg (Right, 8/29/2017); Carpal tunnel release (Right); pr revise median n/carpal tunnel surg (Left, 10/31/2017); Colonoscopy (N/A, 3/30/2018); Colonoscopy (03/30/2018); pr njx aa&/strd tfrml epi lumbar/sacral 1 level (Bilateral, 9/6/2018); other surgical history (09/28/2018); pr njx aa&/strd tfrml epi lumbar/sacral 1 level (N/A, 9/28/2018); Pain management procedure (Left, 7/9/2020); Pain management procedure (Left, 7/20/2020); Pain management procedure (Bilateral, 8/17/2020); other surgical history (Right, 11/23/2020); Nerve Block (Right, 11/23/2020); Pain management procedure (Bilateral, 12/7/2020); Upper gastrointestinal endoscopy (N/A, 12/29/2020); Upper gastrointestinal endoscopy (N/A, 2/2/2021); Upper gastrointestinal endoscopy (N/A, 2/12/2021); Upper gastrointestinal endoscopy (2/12/2021); [place] tooth extraction; IR PORT PLACEMENT > 5 YEARS (4/19/2021); and Upper gastrointestinal endoscopy (N/A, 8/31/2021). Restrictions  Restrictions/Precautions  Restrictions/Precautions: General Precautions,Fall Risk ([place] [employer] twice in past month)  Required Braces or Orthoses?: No  Implants present? :  (pt denies)  Position Activity Restriction  Other position/activity restrictions: Hgb 6.0 - pt not receiving transfusion per RN, ok to proceed with OT/PT evaluations  Vision/Hearing  Vision: Impaired  Vision Exceptions: Wears glasses at all times  Hearing: Exceptions to [facility]  Hearing Exceptions: Hard of hearing/hearing concerns; No hearing aid     Subjective  General  Chart Reviewed: Yes  Patient assessed for rehabilitation services?: Yes  Additional Pertinent Hx: Hgb 6.0 this morning  Family / Caregiver Present: No  Referring Practitioner: Riley Zapata MD  Referral Date : 21  Diagnosis: low hemoglobin  Follows Commands: Within Functional Limits  Subjective  Subjective: Pt in bed, agreeable to PT OT will. RN Doneta Carrel. BP and HR monitored  Pain Screening  Patient Currently in Pain: Yes  Pain Assessment  Pain Assessment: 0-10  Pain Level: 8  Pain Type: Chronic pain  Pain Location: Back  Pain Orientation: Lower  Pain Radiating Towards: none  Pain Descriptors: Discomfort; Sore  Pain Frequency: Continuous  Pain Onset: On-going  Clinical Progression: Gradually worsening  Functional Pain Assessment: Prevents or interferes some active activities and ADLs  Non-Pharmaceutical Pain Intervention(s): Ambulation/Increased Activity; Distraction;Repositioned; Rest  Response to Pain Intervention: Patient Satisfied  Vital Signs  Pulse: 73  BP: 114/64 (seated)  Orthostatic B/P and Pulse?: Yes  Blood Pressure Standin/64  Pulse Standin PER MINUTE  Patient Currently in Pain: Yes  Orthostatic B/P and Pulse?: Yes  Oxygen Therapy  O2 Device: None (Room air)       Orientation  Orientation  Overall Orientation Status: Within Normal Limits  Social/Functional History  Social/Functional History  Lives With: Alone  Type of Home: House  Home Layout: One level  Home Access: Stairs to enter with rails  Entrance Stairs - Number of Steps: 2  Entrance Stairs - Rails: Both (can reach both)  Bathroom Shower/Tub: Tub/Shower unit,Curtain  Bathroom Equipment: Hand-held shower,Grab bars in shower,Grab bars around toilet (reports shower chair is too big)  Bathroom Accessibility: Accessible  Home Equipment: Cane,Rolling walker  ADL Assistance: Independent  Homemaking Assistance: Needs assistance (ex wife A with shopping and taking care of dog)  Homemaking Responsibilities: Yes  Ambulation Assistance: Independent  Transfer Assistance: Independent  Active : Yes  Mode of Transportation: Truck  Occupation: Retired  Type of occupation: iron worker  IADL Comments: sleeps in regular flat bed  Additional Comments: Ex wife assists prn - is also retired. No recent PT OT. Cognition        Objective          AROM RLE (degrees)  RLE AROM: WNL  AROM LLE (degrees)  LLE AROM : WNL  AROM RUE (degrees)  RUE General AROM: See OT  AROM LUE (degrees)  LUE General AROM: See OT  Strength RLE  Strength RLE: WNL  Comment: Grossly 4/5  Strength LLE  Strength LLE: WNL  Comment: Grossly 4/5  Strength RUE  Comment: See OT  Strength LUE  Comment: See OT     Sensation  Overall Sensation Status: WFL (pt denies)  Bed mobility  Rolling to Left: Modified independent  Supine to Sit: Modified independent  Sit to Supine: Modified independent  Scooting: Independent  Comment: Head of bed elevated. No difficulty wtih bed mobility. No dizziness reported. Back to bed end of session for safety. Transfers  Sit to Stand: Supervision  Stand to sit: Supervision  Comment: No device used. No dizziness reported. Ambulation  Ambulation?: Yes  Ambulation 1  Surface: level tile  Device: No Device  Assistance: Contact guard assistance  Quality of Gait: normal yessica, small steps, mild increase sway  Gait Deviations: Decreased step length;Decreased step height  Distance: 20'  Comments: CGA for safety. IV managed by Zaria Worley. Pt reports hx of falls.   Stairs/Curb  Stairs?: No     Balance  Posture: Good  Sitting - Static: Good  Sitting - Dynamic: Good  Standing - Static: Good;-  Standing - Dynamic: Good;-  Comments: Fall risk, no device used        Plan   Plan  Times per week: 3-4x/week  Specific instructions for Next Treatment: check Hgb, progress gait, stairs, HEP  Current Treatment Recommendations: Strengthening,Balance Training,Functional Mobility Training,Transfer Training,Endurance Training,Gait Training,Stair training,Equipment Evaluation, Education, & procurement,Patient/Caregiver Education & Training,Safety Education & Training,Home Exercise Program,Positioning  Safety Devices  Type of devices: All fall risk precautions in place,Call light within reach,Gait belt,Patient at risk for falls,Left in bed,Nurse notified (RN Onel falls)    G-Code       OutComes Score                                                  AM-PAC Score  AM-PAC Inpatient Mobility Raw Score : 18 (12/21/21 1243)  AM-PAC Inpatient T-Scale Score : 43.63 (12/21/21 1243)  Mobility Inpatient CMS 0-100% Score: 46.58 (12/21/21 1243)  Mobility Inpatient CMS G-Code Modifier : CK (12/21/21 1243)          Goals  Short term goals  Time Frame for Short term goals: 5 days  Short term goal 1: Pt to demo bed mobility IND. Short term goal 2: pt to demo transfers IND. Short term goal 3: Pt to amb 150' IND. Short term goal 4: Pt to ascend/descend at least 2 stairs, 1 HR, CGA. Short term goal 5: Pt to demo good technique for balance, walking, and strengthening program.  Patient Goals   Patient goals :  To go home       Therapy Time   Individual Concurrent Group Co-treatment   Time In 1243         Time Out 1303         Minutes Daniela Marie

## 2021-12-21 NOTE — TELEPHONE ENCOUNTER
Post procedure call made. Patient stated he was admitted to the hospital due to low hemoglobin level. Dr. Sarah Santos sent patient to be seen by PCP after procedure due to swelling in arm and legs. Patient had no concerns after procedure and all questions answered.

## 2021-12-21 NOTE — PROGRESS NOTES
10\" (1.778 m)   Wt 225 lb 1.4 oz (102.1 kg)   SpO2 97%   BMI 32.30 kg/m²   General appearance: alert and cooperative with exam, looks pale  Neck: supple, symmetrical, trachea midline  Lungs: clear to auscultation bilaterally  Heart: regular rate and rhythm, S1, S2 normal, no murmur, click, rub or gallop  Abdomen: normal findings: soft, non-tender  Extremities: extremities normal, atraumatic, no cyanosis or edema  Neurologic: Mental status: Alert, oriented, thought content appropriate    Assessment and Plan:   1. Anemia- pt denies any hematemesis, bloody or black stools. No other bleeding anywhere else. Will give another unit of blood as Hb is still only 6 after 2 units. Will consult with hematology/ oncology and GI on board. Will let him eat if no scope today.       Patient Active Problem List:     Back pain, chronic     Hearing difficulty     GERD (gastroesophageal reflux disease)     Cervical radicular pain     Alcohol withdrawal syndrome without complication (HCC)     Hyponatremia     Hypomagnesemia     Chronic viral hepatitis B without delta agent and without coma (HCC)     Calculus of gallbladder without cholecystitis     Hep C w/o coma, chronic (HCC)     Fatty liver     Psychophysiologic insomnia     Cirrhosis (HCC)     Hepatic cirrhosis (HCC)     Vertebrogenic low back pain     DDD (degenerative disc disease), lumbar     Depression     Tubular adenoma of colon     History of colon polyps     Gynecomastia, male     Lumbar radiculitis     Lumbar disc herniation     Tinnitus     Eustachian tube dysfunction     Ganglion cyst     Carpal tunnel syndrome of right wrist     History of hepatitis C     Vitamin D deficiency     Pure hypercholesterolemia     Hypokalemia     Essential hypertension     Recurrent major depressive disorder in partial remission (HCC)     S/P epidural steroid injection     Elevated LFTs     Seasonal allergies     Lumbar facet arthropathy     Cervical facet syndrome     Acute gastrointestinal bleeding     Thrombocytopenia (HCC)     Hepatitis C virus infection resolved after antiviral drug therapy     Gastrointestinal hemorrhage with melena     Alcohol abuse     Altered mental status     Hypocalcemia     Hypophosphatemia     Malignant neoplasm of lower third of esophagus (HCC)     COVID-19     Anxiety     Current smoker     Severe comorbid illness     Gait instability     Abnormal findings on diagnostic imaging of spine     Cervical spinal stenosis     Spinal stenosis of lumbar region with neurogenic claudication     Low hemoglobin     Symptomatic anemia, microcytic, acute     Hypotension     Former smoker, 50+ pack years, quit 2016     HLD (hyperlipidemia)      Electronically signed by Jesús Parrish MD on 12/21/2021 at 8:54 AM

## 2021-12-21 NOTE — PROGRESS NOTES
[address]   OCCUPATIONAL THERAPY MISSED TREATMENT NOTE   INPATIENT   Date: 21  Patient Name: Breanne Garcia       Room: 4997/8627-49  MRN: [id number]   Account #: [de-identified]    : 1960  (63 y.o.)  Gender: male     REASON FOR MISSED TREATMENT:  Patient unable to participate   -   Pt with Hgb of 6.0 and awaiting transfusion per RN. Will check back as able.  Vaughn Benton

## 2021-12-21 NOTE — PLAN OF CARE
Problem: Pain:  Goal: Pain level will decrease  Description: Pain level will decrease  Outcome: Ongoing  Note: Chronic back pain. Medicated as needed. Patient tolerating. Will continue to monitor      Problem: Pain:  Goal: Control of acute pain  Description: Control of acute pain  Outcome: Ongoing  Note: Chronic back pain. Medicated as needed. Patient tolerating. Will continue to monitor      Problem: Pain:  Goal: Control of chronic pain  Description: Control of chronic pain  Outcome: Ongoing  Note: Chronic back pain. Medicated as needed. Patient tolerating. Will continue to monitor      Problem: Falls - Risk of:  Goal: Will remain free from falls  Description: Will remain free from falls  Outcome: Ongoing  Note: Pt remained absent from falls. Call light within reach. Bed locked and in lowest position. Problem: Falls - Risk of:  Goal: Absence of physical injury  Description: Absence of physical injury  Outcome: Ongoing  Note: Patient remains free of injury.  safe environment maintained

## 2021-12-21 NOTE — PROGRESS NOTES
Dr Devante Clark at bedside this am, another unit of prbcs ordered at this time, RN called lab to ensure patient would be able to receive blood with hemoglobin of 6, they stated yes. RN received a call 20m later from lab asking if the patient was symptomatic, they stated if not,then blood will need to be preserved. RN then received a call from the pathologist around [phone] stating if the patient is not symptomatic can the blood be on hold for now. RN notified Dr Ana Rosa Gabriel of this, notified her of pts bp and hr, she states if HR above 90 and sbp less than 100 to give the third unit of blood. RN also spoke with dr Di Putnam regarding patient request for more pain meds at this time, ok to change percocet to q6h prn.   Electronically signed by Rox Bullock RN on 12/21/2021 at 10:52 AM

## 2021-12-21 NOTE — PROGRESS NOTES
RN spoke with dr Radha Long about hemoglobin 5.8, ordered to give unit of prbc now  Electronically signed by Herb Sim RN on 12/21/2021 at 4:27 PM

## 2021-12-21 NOTE — CONSULTS
GI Consult Note:    Name: Julio Hendrix  MRN: [id number]     [place]: [de-identified]  Room: 2086/2086-01    Admit Date: 12/20/2021  PCP: VASU Mazariegos    Physician Requesting Consult: Bobby Drake MD     Reason for Consult:    Severe anemia  Weakness and fatigue  History for cirrhosis of the liver secondary to alcohol abuse  History for esophageal cancer      Chief Complaint:     Chief Complaint   Patient presents with    Abnormal Lab       History Obtained From:     Patient and EMR    History of Present Illness:      Julio Hendrix is a  57 y.o.  male who presents with Abnormal Lab    This gentleman has history significant of multiple chronic medical issues  He has history for cirrhosis of the liver from alcohol abuse  He says has not been drinking for the past couple of months?   He has been seen and followed by my partner  Had multiple upper endoscopies done with the findings of esophageal varices as well as esophageal flat lesion consistent with adenocarcinoma  Has been treated with chemoradiation  Had an endoscopy done late August  Also has been seen and followed at [facility] for that reason    Currently has been admitted with severe symptomatic anemia weakness fatigue and tiredness mild abdominal discomfort  He denies having any fresh rectal bleeding any melanotic stools he had a colonoscopy done in 2018 by my partner small polyp was removed repeat colonoscopy is recommended for next 5 years    Has no known family history for colon cancer  He denies any dysphagia  Denies any nausea vomiting  He is feeling better after blood transfusion    Patient's previous records were reviewed and discussed with the patient    Symptoms:  Onset:  Location:  abdomen  Duration:  day(s)  Severity:  mild, moderate  Quality:  intermittent      Past Medical History:     Past Medical History:   Diagnosis Date    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 ([address]     Anxiety     Arthritis     Back pain, chronic dr. Courtney Duncan, orthopedic, every 3-4 months, gets steroid injection    Lezama esophagus     BPH (benign prostatic hypertrophy)     Cholelithiasis     Cirrhosis ([facility]     COVID-19 12/2020    pt reports he had a positive test while at [facility] in 2020, was asymptomatic    COVID-19 vaccine series completed 5/20/2021, 6/22/2021    Moderna 5/20/2021, 6/22/2021    DDD (degenerative disc disease), lumbar     Depression     Esophageal cancer ([address]     INVASIVE ADENOCARCINOMA ARISING IN TUBULAR ADENOMA WITH HIGH GRADE DYSPLASIA, ASSOCIATED WITH FOCAL INTESTINAL METAPLASIA     Esophageal varices ([address]     Fatty liver     GERD (gastroesophageal reflux disease)     Hep C w/o coma, chronic ([address]     History of alcohol abuse     6-12 beers a day; quit drinking July 2016    History of blood transfusion     History of colon polyps 2016    History of tobacco abuse     [place] (hard of hearing)     Hyperlipidemia     Hypertension     Port-A-Cath in place     right upper chest    Stomach ulcer     hx of    Tubular adenoma of colon 2016, 2018    Vitamin D deficiency     Wears glasses         Past Surgical History:     Past Surgical History:   Procedure Laterality Date    BUNIONECTOMY      twice on right side    BUNIONECTOMY Left     CARPAL TUNNEL RELEASE Right     COLONOSCOPY      at age 35    COLONOSCOPY  10/05/2016    polyps-pathology tubular adenoma, and abnormal looking mucosa right colon-pathology-tubular adenoma    COLONOSCOPY N/A 3/30/2018    COLONOSCOPY POLYPECTOMY COLD BIOPSY performed by Jovan Quinn MD at [address] [place] [address]  03/30/2018    Small polyp in the sigmoid colon and excised with biopsy forceps--tubular adenoma    ENDOSCOPY, COLON, DIAGNOSTIC      EGD    IR PORT PLACEMENT EQUAL OR GREATER THAN 5 YEARS  4/19/2021    IR PORT PLACEMENT EQUAL OR GREATER THAN 5 YEARS 4/19/2021 [facility]ED[facility]    KNEE SURGERY Left     cyst removed    NASAL SEPTUM SURGERY      NERVE BLOCK Right 11/23/2020    NERVE BLOCK RIGHT CERVICAL STEROID INJECTION  C3-C6 performed by Sharon Bermudez MD at [facility]  01/04/16    steroid injection C7 T1    OTHER SURGICAL HISTORY  11/21/2016    Bilateral Lumbar CACHORRO L4-L5 injections    OTHER SURGICAL HISTORY  12/19/2016    lumbar steroid injection    OTHER SURGICAL HISTORY  09/28/2018    BILATERAL L5 CACHORRO (N/A Back)    OTHER SURGICAL HISTORY Right 11/23/2020    cervical injection    PAIN MANAGEMENT PROCEDURE Left 7/9/2020    EPIDURAL STEROID INJECTION LEFT L4 L5 performed by Sharon Bermudez MD at [address] Left 7/20/2020    LEFT L4 L5 EPIDURAL STEROID INJECTION performed by Sharon Bermudez MD at [address] [place] [address] Bilateral 8/17/2020    LUMBAR FACET BILATERAL L2-L5 performed by Sharon Bermudez MD at [address] [place] [address] Bilateral 12/7/2020    NERVE BLOCK BILATERAL LUMBAR MEDIAL BRANCH L2-L5 performed by Sharon Bermudez MD at [address] AA&/STRD TFRML EPI LUMBAR/SACRAL 1 LEVEL Bilateral 9/6/2018    BILATERAL L5 CACHORRO performed by Sharon Bermudez MD at [address] AA&/STRD TFRML EPI LUMBAR/SACRAL 1 LEVEL N/A 9/28/2018    BILATERAL L5 CACHORRO performed by Sharon Bermudez MD at [address] [place] [address] N/CARPAL TUNNEL SURG Right 8/29/2017    CARPAL TUNNEL RELEASE RIGHT performed by Maddie Wilson MD at [address] [place] [address] N/CARPAL TUNNEL SURG Left 10/31/2017    CARPAL TUNNEL RELEASE performed by Maddie Wilson MD at [facility] 12/29/2020    EGD BIOPSY performed by Ben Adamson MD at [address] [place] [address] 2/2/2021    EGD BIOPSY and spot marking performed by Itzel Rodriguez MD at [address] [place] [address] N/A 2/12/2021    ENDOSCOPIC ULTRASOUND, EGD performed by Angelo Burks MD at [address] [place] [address]  2/12/2021    EGD DIAGNOSTIC ONLY performed by Antonio Bolton MD at [address] N/A 8/31/2021    EGD BIOPSY performed by Min Smith MD at [address]          Medications Prior to Admission:       Prior to Admission medications    Medication Sig Start Date End Date Taking? Authorizing Provider   oxyCODONE-acetaminophen (PERCOCET) 5-325 MG per tablet Take 1 tablet by mouth daily as needed for Pain for up to 30 days. 12/14/21 1/13/22 Yes Alejandrina Chin MD   omeprazole (PRILOSEC) 40 MG delayed release capsule take 1 capsule by mouth EVERY MORNING BEFORE BREAKFAST 11/12/21  Yes Arpit Michel MD   FLUoxetine (PROZAC) 20 MG capsule take 1 capsule by mouth once daily 11/12/21  Yes VASU Vargas   lidocaine-prilocaine (EMLA) 2.5-2.5 % cream Apply topically 45-60 mins prior to needle poke daily PRN 4/22/21  Yes Arpit Michel MD   hydrOXYzine (VISTARIL) 25 MG capsule take 1 capsule by mouth three times a day if needed for anxiety 2/23/21  Yes MASSIMO Coleman CNP   atorvastatin (LIPITOR) 20 MG tablet take 1 tablet by mouth at bedtime 2/23/21  Yes MASSIMO Coleman CNP   spironolactone (ALDACTONE) 50 MG tablet take 1 tablet by mouth once daily 2/23/21  Yes MASSIMO Gonzalez CNP   Handicap Placard MISC by Does not apply route 12/14/21   Alejandrina Chin MD        Allergies:       Bee pollen and Pollen extract    Social History:     Tobacco:    reports that he quit smoking about 4 years ago. He has a 45.00 pack-year smoking history. He has never used smokeless tobacco.  Alcohol:      reports previous alcohol use. Drug Use:  reports previous drug use. Frequency: 1.00 time per week.     Family History:     Family History   Problem Relation Age of Onset   [employer] Cancer Mother         pancreatic    Cancer Father         bone    Diabetes Sister     Asthma Brother        Review of Systems:     Positive and Negative as described in HPI    Constitutional:  negative for  fevers, chills, sweats, positive fatigue, and weight loss  HEENT:  negative for vision or hearing changes,   Respiratory:  negative for shortness of breath, cough, or congestion  Cardiovascular:  negative for  chest pain, palpitations  Gastrointestinal:  negative for nausea, vomiting, diarrhea, constipation, mild abdominal pain  Genitourinary:  negative for frequency, dysuria  Integument:  negative for rash, skin lesions  Musculoskeletal: Positive for muscle aches or joint pain  Neurological:  negative for headaches, positive dizziness, lightheadedness, numbness, pain and tingling extrimities  Behavior/Psych:  negative for depression and positive anxiety    Code Status:  Full Code    Physical Exam:     Vitals:  /73   Pulse 75   Temp 98.3 °F (36.8 °C) (Oral)   Resp 20   Ht 5' 10\" (1.778 m)   Wt 225 lb 1.4 oz (102.1 kg)   SpO2 97%   BMI 32.30 kg/m²   Temp (24hrs), Av.1 °F (36.7 °C), Min:97.7 °F (36.5 °C), Max:98.4 °F (36.9 °C)      General appearance - alert, well appearing, and in no acute distress  Mental status - oriented to person, place, and time with anxious affect  Head - normocephalic and atraumatic  Eyes - pupils equal and reactive, extraocular eye movements intact, conjunctiva clear  Ears - hearing appears to be intact  Nose - no drainage noted  Mouth - mucous membranes moist  Neck - supple, no carotid bruits, thyroid not palpable  Chest -Rales to auscultation, normal effort  Heart - normal rate, regular rhythm, no murmurs  Abdomen - soft, bloated mild diffuse tenderness, nondistended, bowel sounds present all four quadrants,  Neurological - normal speech, no focal findings or movement disorder noted, cranial nerves not tested at this time  Extremities -mild pedal edema or calf pain with palpation  Skin - no gross lesions, rashes, or induration noted  Cranial Nerves : Not done at this time  Lymph nodes: not done at this time    Data:   CBC:   Lab Results   Component Value Date    WBC 4.8 12/20/2021    RBC 2.30 12/20/2021    RBC 4.75 04/19/2012    HGB 6.0 12/21/2021    HCT 19.9 12/21/2021    MCV 66.0 12/20/2021    MCH 18.3 12/20/2021    MCHC 27.7 12/20/2021    RDW 19.4 12/20/2021     12/20/2021     04/19/2012    MPV 7.5 12/20/2021     CBC with Differential:    Lab Results   Component Value Date    WBC 4.8 12/20/2021    RBC 2.30 12/20/2021    RBC 4.75 04/19/2012    HGB 6.0 12/21/2021    HCT 19.9 12/21/2021     12/20/2021     04/19/2012    MCV 66.0 12/20/2021    MCH 18.3 12/20/2021    MCHC 27.7 12/20/2021    RDW 19.4 12/20/2021    NRBC 2 06/08/2021    LYMPHOPCT 3 12/20/2021    MONOPCT 3 12/20/2021    MYELOPCT 1 06/01/2021    BASOPCT 0 12/20/2021    MONOSABS 0.14 12/20/2021    LYMPHSABS 0.14 12/20/2021    EOSABS 0.00 12/20/2021    BASOSABS 0.00 12/20/2021    DIFFTYPE NOT REPORTED 12/20/2021     Hemoglobin/Hematocrit:    Lab Results   Component Value Date    HGB 6.0 12/21/2021    HCT 19.9 12/21/2021     CMP:    Lab Results   Component Value Date     12/21/2021    K 3.6 12/21/2021     12/21/2021    CO2 23 12/21/2021    BUN 4 12/21/2021    CREATININE 0.43 12/21/2021    GFRAA >60 12/21/2021    LABGLOM >60 12/21/2021    GLUCOSE 166 12/21/2021    GLUCOSE 65 04/19/2012    PROT 5.8 12/20/2021    LABALBU 3.1 12/20/2021    LABALBU 4.7 04/19/2012    CALCIUM 8.6 12/21/2021    BILITOT 1.91 12/20/2021    ALKPHOS 105 12/20/2021    AST 15 12/20/2021    ALT 7 12/20/2021     BMP:    Lab Results   Component Value Date     12/21/2021    K 3.6 12/21/2021     12/21/2021    CO2 23 12/21/2021    BUN 4 12/21/2021    LABALBU 3.1 12/20/2021    LABALBU 4.7 04/19/2012    CREATININE 0.43 12/21/2021    CALCIUM 8.6 12/21/2021    GFRAA >60 12/21/2021    LABGLOM >60 12/21/2021    GLUCOSE 166 12/21/2021    GLUCOSE 65 04/19/2012     PT/INR:    Lab Results   Component Value Date    PROTIME 11.6 08/19/2021    INR 1.1 08/19/2021     PTT:    Lab Results   Component Value Date    APTT 35.1 04/19/2021   [APTT}    Assesment:     Primary Problem  Symptomatic anemia    Active Hospital Problems    Diagnosis Date Noted    Low hemoglobin [D64.9] 12/20/2021    Symptomatic anemia, microcytic, acute [D64.9] 12/20/2021    Hypotension [I95.9] 12/20/2021    Former smoker, 50+ pack years, quit 2016 [Z87.891] 12/20/2021    HLD (hyperlipidemia) [E78.5] 12/20/2021    Spinal stenosis of lumbar region with neurogenic claudication [M48.062] 12/14/2021    Anxiety [F41.9] 02/23/2021    Hepatitis C virus infection resolved after antiviral drug therapy [Z86.19] 12/23/2020    Hypokalemia [E87.6] 02/04/2019    Depression [F32.A] 10/13/2016    Hepatic cirrhosis ([facility] [K74.60] 09/15/2016    Hyponatremia [E87.1] 07/20/2016    Hearing difficulty [H91.90] 04/19/2012     Severe anemia  Weakness and fatigue  History for cirrhosis of the liver secondary to alcohol abuse  History for esophageal cancer  Plan:     1. Severe symptomatic anemia  2. No overt GI bleeding  3. Occult blood negative  4. Had EGD colonoscopy done in the past  5. There is currently no dysphagia nausea vomiting hematemesis  6. Recommend PPI  7. Blood transfusion  8. IV iron  9. He is already tolerating soft diet  10. Hematology oncology follow-up  11. Will require capsule endoscopy as an outpatient  12. Continue follow-up [address] [facility]  13. Reevaluate in the morning        Thank you for allowing me to participate in the care of your patient. Please feel free to contact me with any questions or concerns.      Electronically signed by Lexy Bynum MD on 12/21/2021 at 11:46 AM     Copy sent to VASU Morgan

## 2021-12-21 NOTE — RESULT ENCOUNTER NOTE
Patient was advised of results and was admitted to the hospital for evaluation of symptomatic anemia.

## 2021-12-21 NOTE — CARE COORDINATION
CASE MANAGEMENT NOTE:    Admission Date:  12/20/2021 Fernando Orozco is a 57 y.o.  male    Admitted for : Low hemoglobin [D64.9]  Symptomatic anemia [D64.9]    Met with:  Patient    PCP:  Dr. Meehan Gist:  [facility]      Is patient alert and oriented at time of discussion:  Yes    Current Residence/ Living Arrangements:  independently at home             Current Services PTA:  Yes pain management    Does patient go to outpatient dialysis: No  If yes, location and chair time: None    Is patient agreeable to VNS: No    Freedom of choice provided:  No    List of [address] provided: No    VNS chosen:  No    DME:  straight cane and walker    Home Oxygen: No    Nebulizer: No    CPAP/BIPAP: No    Supplier: N/A    Potential Assistance Needed: No    SNF needed: No    Freedom of choice and list provided: No    Pharmacy:  [facility]       Does Patient want to use MEDS to BEDS? No    Is patient currently receiving oral anticoagulation therapy? No    Is the Patient an [facility] with Readmission Risk Score greater than 14%? No  If yes, pt needs a follow up appointment made within 7 days. Family Members/Caregivers that pt would like involved in their care:    Yes     If yes, list name here:  [address]    Transportation Provider:  Family             Discharge Plan:  12.21.2021 [facility]. Pt lives in ranch home with basement. DME: cane and walker. Denies need for VNS/SNF. Currently goig to pain management for intervention. GI consulted possible EGD/Colonoscopy.  Hbb 6.0 HCT 19.9 ProBNP 602 Will continue to follow. //SD                 Electronically signed by: Josue Toribio RN on 12/21/2021 at 11:09 AM

## 2021-12-21 NOTE — PROGRESS NOTES
Patient arrived to room 2086 via bed. A&O x 4. In no acute distress. Vitals stable. Bed lock in lowest position, call light in reach. Assessment complete.  All questions answered

## 2021-12-21 NOTE — PROGRESS NOTES
Notified MARIAELENA Panchal of Hgb 6.0.  Verified with lab that due to blood shortage patient does not need infused unless lower than 6.0

## 2021-12-21 NOTE — PROGRESS NOTES
Medication History completed:    New medications: none    Medications discontinued: ergocalciferol, ondansetron    Changes to dosing: none    Stated allergies: As listed    Other pertinent information: Medications confirmed with [facility].      Thank you,  Alvah Duane, PharmD, BCPS  [phone]

## 2021-12-21 NOTE — CONSULTS
Today's Date: 12/21/2021  Patient Name: Sohan Arguelles  Date of admission: 12/20/2021  5:12 PM  Patient's age: 57 y.o., 1960  Admission Dx: Low hemoglobin [D64.9]  Symptomatic anemia [D64.9]    Reason for Consult: management recommendations  Requesting Physician: Jesús Parrish MD    CHIEF COMPLAINT: anemia     History Obtained From:  patient    HISTORY OF PRESENT ILLNESS:      The patient is a 57 y.o.  male who is admitted to the hospital for management of anemia  Pt has known alcoholic cirrhosis , he has known esophageal varices. And hx of esophageal cancer, treated with chemoradiation. Pt seen by GI with plans for EGD. Pt is receiving transfusion  Pt denies bleeding      Past Medical History:   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 ([address] Anxiety, Arthritis, Back pain, chronic, Lezama esophagus, BPH (benign prostatic hypertrophy), Cholelithiasis, Cirrhosis ([address] COVID-19, COVID-19 vaccine series completed, DDD (degenerative disc disease), lumbar, Depression, Esophageal cancer ([address] Esophageal varices ([address] Fatty liver, GERD (gastroesophageal reflux disease), Hep C w/o coma, chronic ([address] History of alcohol abuse, History of blood transfusion, History of colon polyps, History of tobacco abuse, [place] (hard of hearing), Hyperlipidemia, Hypertension, Port-A-Cath in place, Stomach ulcer, Tubular adenoma of colon, Vitamin D deficiency, and Wears glasses. Past Surgical History:   has a past surgical history that includes Bunionectomy; Nasal septum surgery; other surgical history (01/04/16); Colonoscopy; Colonoscopy (10/05/2016); other surgical history (11/21/2016); other surgical history (12/19/2016); knee surgery (Left); Bunionectomy (Left); Endoscopy, colon, diagnostic; pr revise median n/carpal tunnel surg (Right, 8/29/2017); Carpal tunnel release (Right); pr revise median n/carpal tunnel surg (Left, 10/31/2017); Colonoscopy (N/A, 3/30/2018);  Colonoscopy (03/30/2018); pr njx aa&/strd tfrml epi lumbar/sacral 1 level (Bilateral, 9/6/2018); other surgical history (09/28/2018); pr njx aa&/strd tfrml epi lumbar/sacral 1 level (N/A, 9/28/2018); Pain management procedure (Left, 7/9/2020); Pain management procedure (Left, 7/20/2020); Pain management procedure (Bilateral, 8/17/2020); other surgical history (Right, 11/23/2020); Nerve Block (Right, 11/23/2020); Pain management procedure (Bilateral, 12/7/2020); Upper gastrointestinal endoscopy (N/A, 12/29/2020); Upper gastrointestinal endoscopy (N/A, 2/2/2021); Upper gastrointestinal endoscopy (N/A, 2/12/2021); Upper gastrointestinal endoscopy (2/12/2021); [place] tooth extraction; IR PORT PLACEMENT > 5 YEARS (4/19/2021); and Upper gastrointestinal endoscopy (N/A, 8/31/2021). Medications:    Reviewed in Epic     Allergies:  Bee pollen and Pollen extract    Social History:   reports that he quit smoking about 4 years ago. He has a 45.00 pack-year smoking history. He has never used smokeless tobacco. He reports previous alcohol use. He reports previous drug use. Frequency: 1.00 time per week. Family History: family history includes Asthma in his brother; Cancer in his father and mother; Diabetes in his sister. REVIEW OF SYSTEMS:    Constitutional: No fever or chills. No night sweats, no weight loss , worsening fatigue   Eyes: No eye discharge, double vision, or eye pain   HEENT: negative for sore mouth, sore throat, hoarseness and voice change   Respiratory: negative for cough , sputum, dyspnea, wheezing, hemoptysis, chest pain   Cardiovascular: negative for chest pain, dyspnea, palpitations, orthopnea, PND   Gastrointestinal: negative for nausea, vomiting, diarrhea, constipation, abdominal pain, Dysphagia, hematemesis and hematochezia   Genitourinary: negative for frequency, dysuria, nocturia, urinary incontinence, and hematuria   Integument: negative for rash, skin lesions, bruises.    Hematologic/Lymphatic: negative for easy bruising, bleeding, lymphadenopathy, or petechiae   Endocrine: negative for heat or cold intolerance,weight changes, change in bowel habits and hair loss   Musculoskeletal: negative for myalgias, arthralgias, pain, joint swelling,and bone pain   Neurological: negative for headaches, dizziness, seizures, weakness, numbness    PHYSICAL EXAM:      BP (!) 104/50   Pulse 74   Temp 97.6 °F (36.4 °C) (Axillary)   Resp 16   Ht 5' 10\" (1.778 m)   Wt 225 lb 1.4 oz (102.1 kg)   SpO2 100%   BMI 32.30 kg/m²    Temp (24hrs), Av.1 °F (36.7 °C), Min:97.6 °F (36.4 °C), Max:98.5 °F (36.9 °C)    General appearance - well appearing, no in pain or distress   Mental status - alert and cooperative   Eyes - pupils equal and reactive, extraocular eye movements intact   Ears - bilateral TM's and external ear canals normal   Mouth - mucous membranes moist, pharynx normal without lesions   Neck - supple, no significant adenopathy   Lymphatics - no palpable lymphadenopathy, no hepatosplenomegaly   Chest - clear to auscultation, no wheezes, rales or rhonchi, symmetric air entry   Heart - normal rate, regular rhythm, normal S1, S2, no murmurs  Abdomen - soft, nontender, nondistended, no masses or organomegaly   Neurological - alert, oriented, normal speech, no focal findings or movement disorder noted   Musculoskeletal - no joint tenderness, deformity or swelling   Extremities - peripheral pulses normal, no pedal edema, no clubbing or cyanosis   Skin - normal coloration and turgor, no rashes, no suspicious skin lesions noted ,    DATA:    Labs:   CBC: Recent Labs     21  1620 21  2142 21  0553 21  1525   WBC 4.8  --   --   --    HGB 4.2*   < > 6.0* 5.8*   HCT 15.2*   < > 19.9* 19.4*     --   --   --     < > = values in this interval not displayed.      BMP:   Recent Labs     21  1620 21  0553   * 137   K 3.4* 3.6*   CO2 24 23   BUN 5* 4*   CREATININE 0.59* 0.43*   LABGLOM >60 >60 GLUCOSE 168* 166*     PT/INR: No results for input(s): PROTIME, INR in the last 72 hours. IMAGING DATA:      Primary Problem  Symptomatic anemia    Active Hospital Problems    Diagnosis Date Noted    Esophageal adenocarcinoma ([facility] [C15.9]     Low hemoglobin [D64.9] 12/20/2021    Symptomatic anemia, microcytic, acute [D64.9] 12/20/2021    Hypotension [I95.9] 12/20/2021    Former smoker, 50+ pack years, quit 2016 [Z87.891] 12/20/2021    HLD (hyperlipidemia) [E78.5] 12/20/2021    Spinal stenosis of lumbar region with neurogenic claudication [M48.062] 12/14/2021    Anxiety [F41.9] 02/23/2021    Hepatitis C virus infection resolved after antiviral drug therapy [Z86.19] 12/23/2020    Hypokalemia [E87.6] 02/04/2019    Depression [F32.A] 10/13/2016    Hepatic cirrhosis ([facility] [K74.60] 09/15/2016    Hyponatremia [E87.1] 07/20/2016    Hearing difficulty [H91.90] 04/19/2012         IMPRESSION:   1. Alcoholic cirrhosis   2. Hx of esophageal varices   3. GI bleeding  4. Hx pf esophageal cancer, post chemo XRT       RECOMMENDATIONS:  1. Agree with transfusion  2. Plan IV iron  3. Might need PICC line  4. Need GI workup      Discussed with patient and Nurse. Thank you for asking us to see this patient.     DHARMESH CARBALLO [facility] MD [place]  Hematologist/Medical Oncologist  Cell: [phone]

## 2021-12-21 NOTE — PROGRESS NOTES
[address]   Occupational Therapy Evaluation  Date: 21  Patient Name: Emma Zimmer       Room: 3813/7105-06  MRN: [id number]  Account: [de-identified]   : 1960  (63 y.o.) Gender: male     Discharge Recommendations: The patient's needs are being met with no further Occupational Therapy recommended at discharge. Equipment Needed:  (TBD)    Referring Practitioner: Dr. Meghana Sher  Diagnosis: Symptomatic anemia, low Hgb    Treatment Diagnosis: Impaired self-care status  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 ([facility] Anxiety, Arthritis, Back pain, chronic, [place] esophagus, BPH (benign prostatic hypertrophy), Cholelithiasis, Cirrhosis ([address] COVID-19, COVID-19 vaccine series completed, DDD (degenerative disc disease), lumbar, Depression, Esophageal cancer ([facility] Esophageal varices ([facility] Fatty liver, GERD (gastroesophageal reflux disease), Hep C w/o coma, chronic ([address] History of alcohol abuse, History of blood transfusion, History of colon polyps, History of tobacco abuse, [place] (hard of hearing), Hyperlipidemia, Hypertension, Port-A-Cath in place, Stomach ulcer, Tubular adenoma of colon, Vitamin D deficiency, and Wears glasses. Past Surgical History:   has a past surgical history that includes Bunionectomy; Nasal septum surgery; other surgical history (16); Colonoscopy; Colonoscopy (10/05/2016); other surgical history (2016); other surgical history (2016); knee surgery (Left); Bunionectomy (Left); Endoscopy, colon, diagnostic; pr revise median n/carpal tunnel surg (Right, 2017); Carpal tunnel release (Right); pr revise median n/carpal tunnel surg (Left, 10/31/2017); Colonoscopy (N/A, 3/30/2018); Colonoscopy (2018); pr njx aa&/strd tfrml epi lumbar/sacral 1 level (Bilateral, 2018); other surgical history (2018); pr njx aa&/strd tfrml epi lumbar/sacral 1 level (N/A, 2018);  Pain management procedure (Left, 7/9/2020); Pain management procedure (Left, 7/20/2020); Pain management procedure (Bilateral, 8/17/2020); other surgical history (Right, 11/23/2020); Nerve Block (Right, 11/23/2020); Pain management procedure (Bilateral, 12/7/2020); Upper gastrointestinal endoscopy (N/A, 12/29/2020); Upper gastrointestinal endoscopy (N/A, 2/2/2021); Upper gastrointestinal endoscopy (N/A, 2/12/2021); Upper gastrointestinal endoscopy (2/12/2021); [place] tooth extraction; IR PORT PLACEMENT > 5 YEARS (4/19/2021); and Upper gastrointestinal endoscopy (N/A, 8/31/2021). Restrictions  Restrictions/Precautions: General Precautions,Fall Risk (Daivd Hosteller twice in past month)  Implants present? :  (pt denies)  Other position/activity restrictions: Hgb 6.0 - pt not receiving transfusion per RN, ok to proceed with OT/PT evaluations  Required Braces or Orthoses?: No     Vitals  Temp: 98.3 °F (36.8 °C)  Pulse: 73 (seated; 78 in standing)  Resp: 20  BP: 114/64 (seated; 118/62 in standing)  Height: 5' 10\" (177.8 cm)  Weight: 225 lb 1.4 oz (102.1 kg)  BMI (Calculated): 32.4  Oxygen Therapy  SpO2: 97 %  Pulse Oximeter Device Mode: Intermittent  Pulse Oximeter Device Location: Right,Hand,Finger  O2 Device: None (Room air)  Level of Consciousness: Alert (0)    Subjective  Subjective: \"My back really hurts\"  Comments: Pt reported back pain in supine and in sitting at EOB, some relief in standing.   Overall Orientation Status: Within Functional Limits  Vision  Vision: Impaired  Vision Exceptions: Wears glasses at all times  Hearing  Hearing: Exceptions to [facility]  Hearing Exceptions: Hard of hearing/hearing concerns,No hearing aid  Social/Functional History  Lives With: Alone  Type of Home: House  Home Layout: One level  Home Access: Stairs to enter with rails  Entrance Stairs - Number of Steps: 2  Entrance Stairs - Rails: Both (can reach both)  Bathroom Shower/Tub: Tub/Shower unit,Curtain  Bathroom Equipment: Hand-held shower,Grab bars in shower,Grab bars around toilet (reports shower chair is too big)  Bathroom Accessibility: Accessible  Home Equipment: Cane,Rolling walker  ADL Assistance: Independent  Homemaking Assistance: Needs assistance (ex wife A with shopping and taking care of dog)  Homemaking Responsibilities: Yes  Ambulation Assistance: Independent  Transfer Assistance: Independent  Active : Yes  Mode of Transportation: Truck  Occupation: Retired  Type of occupation:   IADL Comments: sleeps in regular flat bed  Additional Comments: Ex wife assists prn - is also retired. No recent PT OT. Pain Assessment  Pain Assessment: 0-10  Pain Level: 8  Pain Type: Chronic pain  Pain Location: Back  Pain Orientation: Lower  Pain Descriptors: Discomfort,Sore  Pain Frequency: Continuous    Objective  Vision - Basic Assessment  Patient Visual Report: No visual complaint reported. Cognition  Overall Cognitive Status: WFL   Perception  Overall Perceptual Status: WFL  Sensation  Overall Sensation Status: WFL (pt denies)   ADL  Feeding: Independent  Grooming: Setup  UE Bathing: Setup  LE Bathing: Contact guard assistance  UE Dressing: Setup  LE Dressing: Contact guard assistance  Toileting: Contact guard assistance  Additional Comments: Recommend CGA for all standing tasks and transfers 2* slightly unsteady as well as low Hgb. Pt denies any difficulty don/doffing socks but declines to demonstrate. Other areas based on pt report, observation, and clinical reasoning.     UE Function  LUE Strength  Gross LUE Strength: WFL  L Hand General: 4+/5     LUE Tone: Normotonic     LUE AROM (degrees)  LUE AROM : WFL     Left Hand AROM (degrees)  Left Hand AROM: WFL     RUE Strength  Gross RUE Strength: WFL  R Hand General: 4+/5      RUE Tone: Normotonic     RUE AROM (degrees)  RUE AROM : WFL     Right Hand AROM (degrees)  Right Hand AROM: WFL    Fine Motor Skills  Coordination  Movements Are Fluid And Coordinated: Yes     Mobility  Supine to Sit: Modified independent  Sit to Supine: Modified independent       Balance  Sitting Balance: Independent  Standing Balance: Contact guard assistance     Functional Mobility  Functional Mobility Comments: Pt walked to door and back with no device and CGA for safety. Bed mobility  Supine to Sit: Modified independent  Sit to Supine: Modified independent  Scooting: Modified independent     Transfers  Stand Step Transfers: Contact guard assistance  Stand Pivot Transfers: Contact guard assistance  Sit to stand: Supervision  Stand to sit: Supervision  Transfer Comments: No device, unsteady at times  Functional Activity Tolerance  Functional Activity Tolerance: Tolerates < 10 Min exercise, no significant change in vital signs  Additional Comments: Pt fatigued and c/o back pain. Assessment  Assessment  Performance deficits / Impairments: Decreased functional mobility ,Decreased ADL status,Decreased endurance,Decreased balance,Decreased high-level IADLs  Treatment Diagnosis: Impaired self-care status  Prognosis: Good  Decision Making: Medium Complexity  REQUIRES OT FOLLOW UP: Yes  Activity Tolerance: Treatment limited secondary to medical complications (free text)  Activity Tolerance: Limited mobility 2* Hgb 6.0    Goals  Patient Goals   Patient goals : Return home with A as needed. Short term goals  Time Frame for Short term goals: By Discharge  Short term goal 1: Pt will complete BADL's with Mod I and Good safety. Short term goal 2: Pt will complete functional transfers with Mod I and Good safety using least restrictive device. Short term goal 3: Pt will tolerate standing for 3-5 minutes with 0-1 UE support and no LOB while completing a functional task. Short term goal 4: Pt will actively participate in 15-20 minutes of therapeutic exercise/activity to promote increased independence and safety with self-care and mobility.     Plan  Safety Devices  Safety Devices in place: Yes  Type of devices: Patient at risk for falls,Gait

## 2021-12-21 NOTE — PROGRESS NOTES
Physical Therapy        Physical Therapy Cancel Note      DATE: 2021    NAME: Martha Fernandez  MRN: [id number]   : 1960      Patient not seen this date for Physical Therapy due to:    Blood Transfusion:  Pt Hgb critical 6.0, awaiting transfusion per JERI Guajardo at 840, will reattempt as able.       Electronically signed by Arnaud Sanchez PT on 2021 at 8:45 AM

## 2021-12-21 NOTE — FLOWSHEET NOTE
12/21/21 0948   Encounter Summary   Services provided to: Patient   Referral/Consult From: Rounding   Continue Visiting   (12/21/21)   Complexity of Encounter Low   Length of Encounter 15 minutes   Spiritual/Mu-ism   Type Spiritual support   Sacraments   Sacrament of Sick-Anointing Patient declined anointing

## 2021-12-21 NOTE — PLAN OF CARE
Jewels Payan is a 57 y.o. male with past medical history of arthritis, chronic back pain 2/2 cervical and lumbar spinal stenosis with neurogenic claudication, hep C s/p treatment, liver cirrhosis, previous smoker (~50+ pack years, quit a few years ago), esophageal CA currently in remission s/p chemotherapy and radiation, previous alcohol use (6 pack of beer per day, quit a few years ago), depression, and anxiety who presents to the ED on 12/20/21 after being referred by pain management doctor for abnormal lab values with low Hgb.      Labs drawn as outpatient today via his regular pain management physician whom pt was seeing today for lumbar epidural steroid injection for management of spinal stenosis and chronic back pain.      Admits he has been feeling progressively increasing fatigue, weakness, shortness of breath exacerbated by exertion and limiting the distance he can walk, lightheadedness, and poor p.o. intake secondary to no appetite for the past month. Denies any cravings of ice or other. Admits to a few falls in the past month without any trauma/bleeding and without any hits to the head. 1 week ago patient also had itching and swelling of left arm which improved after he applied lidocaine topically to it. Bilateral leg swelling noted since 2-3 days ago and pt reports 10 lb weight gain within the last week despite little to no p.o. intake.      In the ED, patient found to be hypotensive on presentation with /46 labs showing hemoglobin of 4.2. Type and cross ordered and transfusion of 1 PRBC started in ED.     Decision was made to admit the patient for management and work-up of symptomatic anemia. Nursing team informed residents that this was a patient of Dr. Monika Ann. Resident team will no longer see patient.     Electronically signed by Maria Elena Maria MD on 12/21/2021 at 8:10 AM

## 2021-12-22 ENCOUNTER — APPOINTMENT (OUTPATIENT)
Dept: INTERVENTIONAL RADIOLOGY/VASCULAR | Age: 61
DRG: 812 | End: 2021-12-22
Payer: MEDICARE

## 2021-12-22 LAB
ANION GAP SERPL CALCULATED.3IONS-SCNC: 8 MMOL/L (ref 9–17)
BUN BLDV-MCNC: 7 MG/DL (ref 8–23)
BUN/CREAT BLD: ABNORMAL (ref 9–20)
CALCIUM SERPL-MCNC: 8.4 MG/DL (ref 8.6–10.4)
CHLORIDE BLD-SCNC: 107 MMOL/L (ref 98–107)
CO2: 23 MMOL/L (ref 20–31)
CREAT SERPL-MCNC: 0.51 MG/DL (ref 0.7–1.2)
GFR AFRICAN AMERICAN: >60 ML/MIN
GFR NON-AFRICAN AMERICAN: >60 ML/MIN
GFR SERPL CREATININE-BSD FRML MDRD: ABNORMAL ML/MIN/{1.73_M2}
GFR SERPL CREATININE-BSD FRML MDRD: ABNORMAL ML/MIN/{1.73_M2}
GLUCOSE BLD-MCNC: 115 MG/DL (ref 70–99)
HCT VFR BLD CALC: 23 % (ref 41–53)
HCT VFR BLD CALC: 24.2 % (ref 41–53)
HEMOGLOBIN: 7 G/DL (ref 13.5–17.5)
HEMOGLOBIN: 7.5 G/DL (ref 13.5–17.5)
PATHOLOGIST REVIEW: NORMAL
POTASSIUM SERPL-SCNC: 3.8 MMOL/L (ref 3.7–5.3)
SODIUM BLD-SCNC: 138 MMOL/L (ref 135–144)

## 2021-12-22 PROCEDURE — 02HV33Z INSERTION OF INFUSION DEVICE INTO SUPERIOR VENA CAVA, PERCUTANEOUS APPROACH: ICD-10-PCS | Performed by: ANESTHESIOLOGY

## 2021-12-22 PROCEDURE — 2709999900 IR FLUORO GUIDED CVA DEVICE PLMT/REPLACE/REMOVAL

## 2021-12-22 PROCEDURE — C9113 INJ PANTOPRAZOLE SODIUM, VIA: HCPCS | Performed by: STUDENT IN AN ORGANIZED HEALTH CARE EDUCATION/TRAINING PROGRAM

## 2021-12-22 PROCEDURE — 36415 COLL VENOUS BLD VENIPUNCTURE: CPT

## 2021-12-22 PROCEDURE — 97116 GAIT TRAINING THERAPY: CPT

## 2021-12-22 PROCEDURE — 2580000003 HC RX 258

## 2021-12-22 PROCEDURE — 6370000000 HC RX 637 (ALT 250 FOR IP): Performed by: FAMILY MEDICINE

## 2021-12-22 PROCEDURE — 6360000002 HC RX W HCPCS: Performed by: STUDENT IN AN ORGANIZED HEALTH CARE EDUCATION/TRAINING PROGRAM

## 2021-12-22 PROCEDURE — APPSS30 APP SPLIT SHARED TIME 16-30 MINUTES: Performed by: NURSE PRACTITIONER

## 2021-12-22 PROCEDURE — 36573 INSJ PICC RS&I 5 YR+: CPT

## 2021-12-22 PROCEDURE — 6360000002 HC RX W HCPCS: Performed by: INTERNAL MEDICINE

## 2021-12-22 PROCEDURE — 6370000000 HC RX 637 (ALT 250 FOR IP): Performed by: STUDENT IN AN ORGANIZED HEALTH CARE EDUCATION/TRAINING PROGRAM

## 2021-12-22 PROCEDURE — 2580000003 HC RX 258: Performed by: INTERNAL MEDICINE

## 2021-12-22 PROCEDURE — 6370000000 HC RX 637 (ALT 250 FOR IP)

## 2021-12-22 PROCEDURE — 2060000000 HC ICU INTERMEDIATE R&B

## 2021-12-22 PROCEDURE — 85014 HEMATOCRIT: CPT

## 2021-12-22 PROCEDURE — 80048 BASIC METABOLIC PNL TOTAL CA: CPT

## 2021-12-22 PROCEDURE — 2580000003 HC RX 258: Performed by: STUDENT IN AN ORGANIZED HEALTH CARE EDUCATION/TRAINING PROGRAM

## 2021-12-22 PROCEDURE — 97530 THERAPEUTIC ACTIVITIES: CPT

## 2021-12-22 PROCEDURE — 85018 HEMOGLOBIN: CPT

## 2021-12-22 PROCEDURE — 99232 SBSQ HOSP IP/OBS MODERATE 35: CPT | Performed by: INTERNAL MEDICINE

## 2021-12-22 PROCEDURE — 6360000002 HC RX W HCPCS: Performed by: NURSE PRACTITIONER

## 2021-12-22 PROCEDURE — 2580000003 HC RX 258: Performed by: NURSE PRACTITIONER

## 2021-12-22 RX ADMIN — OCTREOTIDE ACETATE 25 MCG/HR: 500 INJECTION, SOLUTION INTRAVENOUS; SUBCUTANEOUS at 10:32

## 2021-12-22 RX ADMIN — PANTOPRAZOLE SODIUM 8 MG/HR: 40 INJECTION, POWDER, FOR SOLUTION INTRAVENOUS at 10:31

## 2021-12-22 RX ADMIN — OCTREOTIDE ACETATE 25 MCG/HR: 500 INJECTION, SOLUTION INTRAVENOUS; SUBCUTANEOUS at 07:58

## 2021-12-22 RX ADMIN — SPIRONOLACTONE 50 MG: 25 TABLET, FILM COATED ORAL at 07:40

## 2021-12-22 RX ADMIN — PANTOPRAZOLE SODIUM 8 MG/HR: 40 INJECTION, POWDER, FOR SOLUTION INTRAVENOUS at 07:58

## 2021-12-22 RX ADMIN — OXYCODONE HYDROCHLORIDE AND ACETAMINOPHEN 1 TABLET: 5; 325 TABLET ORAL at 20:10

## 2021-12-22 RX ADMIN — FLUOXETINE HYDROCHLORIDE 20 MG: 20 CAPSULE ORAL at 07:40

## 2021-12-22 RX ADMIN — ATORVASTATIN CALCIUM 20 MG: 20 TABLET, FILM COATED ORAL at 20:10

## 2021-12-22 RX ADMIN — IRON SUCROSE 300 MG: 20 INJECTION, SOLUTION INTRAVENOUS at 18:35

## 2021-12-22 RX ADMIN — PANTOPRAZOLE SODIUM 8 MG/HR: 40 INJECTION, POWDER, FOR SOLUTION INTRAVENOUS at 18:30

## 2021-12-22 RX ADMIN — OXYCODONE HYDROCHLORIDE AND ACETAMINOPHEN 1 TABLET: 5; 325 TABLET ORAL at 14:40

## 2021-12-22 RX ADMIN — SODIUM CHLORIDE: 9 INJECTION, SOLUTION INTRAVENOUS at 17:21

## 2021-12-22 RX ADMIN — SODIUM CHLORIDE: 9 INJECTION, SOLUTION INTRAVENOUS at 10:32

## 2021-12-22 RX ADMIN — SODIUM CHLORIDE: 9 INJECTION, SOLUTION INTRAVENOUS at 18:34

## 2021-12-22 RX ADMIN — OXYCODONE HYDROCHLORIDE AND ACETAMINOPHEN 1 TABLET: 5; 325 TABLET ORAL at 07:40

## 2021-12-22 RX ADMIN — Medication 10 ML: at 07:43

## 2021-12-22 ASSESSMENT — PAIN SCALES - GENERAL
PAINLEVEL_OUTOF10: 8
PAINLEVEL_OUTOF10: 4
PAINLEVEL_OUTOF10: 8
PAINLEVEL_OUTOF10: 8
PAINLEVEL_OUTOF10: 6
PAINLEVEL_OUTOF10: 8
PAINLEVEL_OUTOF10: 5
PAINLEVEL_OUTOF10: 8
PAINLEVEL_OUTOF10: 0
PAINLEVEL_OUTOF10: 5

## 2021-12-22 ASSESSMENT — PAIN DESCRIPTION - PAIN TYPE
TYPE: CHRONIC PAIN

## 2021-12-22 ASSESSMENT — PAIN DESCRIPTION - ORIENTATION
ORIENTATION: LOWER

## 2021-12-22 ASSESSMENT — PAIN DESCRIPTION - LOCATION
LOCATION: BACK

## 2021-12-22 ASSESSMENT — PAIN DESCRIPTION - PROGRESSION: CLINICAL_PROGRESSION: GRADUALLY WORSENING

## 2021-12-22 ASSESSMENT — PAIN - FUNCTIONAL ASSESSMENT: PAIN_FUNCTIONAL_ASSESSMENT: PREVENTS OR INTERFERES SOME ACTIVE ACTIVITIES AND ADLS

## 2021-12-22 ASSESSMENT — PAIN DESCRIPTION - FREQUENCY: FREQUENCY: CONTINUOUS

## 2021-12-22 ASSESSMENT — PAIN DESCRIPTION - ONSET: ONSET: ON-GOING

## 2021-12-22 ASSESSMENT — PAIN DESCRIPTION - DESCRIPTORS: DESCRIPTORS: DISCOMFORT

## 2021-12-22 NOTE — PROGRESS NOTES
Goal: No pain  Pain Type: Chronic pain  Pain Location: Back  Pain Orientation: Lower  Pain Descriptors: Discomfort  Pain Frequency: Continuous  Pain Onset: On-going  Clinical Progression: Gradually worsening  Functional Pain Assessment: Prevents or interferes some active activities and ADLs  Non-Pharmaceutical Pain Intervention(s): Ambulation/Increased Activity; Distraction;Repositioned; Rest  Response to Pain Intervention: Patient Satisfied  Vital Signs  Patient Currently in Pain: Yes  Oxygen Therapy  SpO2: 100 %  Pulse Oximeter Device Mode: Intermittent  Pulse Oximeter Device Location: Finger  O2 Device: None (Room air)     Objective   Bed mobility  Rolling to Left: Independent  Supine to Sit: Independent  Sit to Supine: Independent  Scooting: Independent  Comment: No signs of difficulty with bed mobility. Patient left in bed with call light within reach. Transfers  Sit to Stand: Supervision  Stand to sit: Supervision  Comment: No device used. No dizziness reported. Ambulation  Ambulation?: Yes  Ambulation 1  Surface: level tile  Device: No Device  Other Apparatus:  (IV pole)  Assistance: Contact guard assistance  Quality of Gait: normal yessica, small steps, mild increase sway  Gait Deviations: Decreased step length;Decreased step height  Distance: 200ft  Comments: CGA for safety. 1 seated rest break. Encouraged patient to take rest breaks prn. Slight fatigue noted following amb, 2 min to recover. Stairs/Curb  Stairs?: No     Balance  Posture: Good  Sitting - Static: Good  Sitting - Dynamic: Good  Standing - Static: Good;-  Standing - Dynamic: Good;-  Comments: Fall risk, no device used            Other Activities: (P) Other (see comment)  Comment: (P) Provided patient with Supine HEP and educated patient on benefits of mobility. Goals  Short term goals  Time Frame for Short term goals: 5 days  Short term goal 1: Pt to demo bed mobility IND. Short term goal 2: pt to demo transfers IND.   Short term goal 3: Pt to amb 150' IND. Short term goal 4: Pt to ascend/descend at least 2 stairs, 1 HR, CGA. Short term goal 5: Pt to demo good technique for balance, walking, and strengthening program.  Patient Goals   Patient goals : To go home    Plan    Plan  Times per week: 3-4x/week  Specific instructions for Next Treatment: check Hgb, progress gait, stairs, HEP  Current Treatment Recommendations: Strengthening,Balance Training,Functional Mobility Training,Transfer Training,Endurance Training,Gait Training,Stair training,Equipment Evaluation, Education, & procurement,Patient/Caregiver Education & Training,Safety Education & Training,Home Exercise Program,Positioning  Safety Devices  Type of devices:  All fall risk precautions in place,Call light within reach,Gait belt,Patient at risk for falls,Left in bed,Nurse notified (JERI Rincon)     Therapy Time   Individual Concurrent Group Co-treatment   Time In 1105         Time Out 1128         Minutes [address] [facility], [address]

## 2021-12-22 NOTE — PROGRESS NOTES
Ultrasound in use. Right arm prepped and draped. Numbed and accessed per Dr. Ronald Warren. PICC line insertion completed. Dressing placed. Report called to floor.

## 2021-12-22 NOTE — PROGRESS NOTES
nocturia, urinary incontinence, and hematuria   Integument: negative for rash, skin lesions, bruises. Hematologic/Lymphatic: negative for easy bruising, bleeding, lymphadenopathy, or petechiae   Endocrine: negative for heat or cold intolerance,weight changes, change in bowel habits and hair loss   Musculoskeletal: negative for myalgias, arthralgias, pain, joint swelling,and bone pain   Neurological: negative for headaches, dizziness, seizures, weakness, numbness    PHYSICAL EXAM:      /65   Pulse 81   Temp 97.8 °F (36.6 °C) (Oral)   Resp 16   Ht 5' 10\" (1.778 m)   Wt 225 lb 12 oz (102.4 kg)   SpO2 100%   BMI 32.39 kg/m²    Temp (24hrs), Av °F (36.7 °C), Min:97.6 °F (36.4 °C), Max:98.5 °F (36.9 °C)    General appearance - well appearing, no in pain or distress   Mental status - alert and cooperative   Eyes - pupils equal and reactive, extraocular eye movements intact   Ears - bilateral TM's and external ear canals normal   Mouth - mucous membranes moist, pharynx normal without lesions   Neck - supple, no significant adenopathy   Lymphatics - no palpable lymphadenopathy, no hepatosplenomegaly   Chest - clear to auscultation, no wheezes, rales or rhonchi, symmetric air entry   Heart - normal rate, regular rhythm, normal S1, S2, no murmurs  Abdomen - soft, nontender, nondistended, no masses or organomegaly   Neurological - alert, oriented, normal speech, no focal findings or movement disorder noted   Musculoskeletal - no joint tenderness, deformity or swelling   Extremities - peripheral pulses normal, no pedal edema, no clubbing or cyanosis   Skin - normal coloration and turgor, no rashes, no suspicious skin lesions noted ,    DATA:    Labs:   CBC:   Recent Labs     21  1620 21  2142 21  2303 21  0731   WBC 4.8  --   --   --    HGB 4.2*   < > 6.9* 7.5*   HCT 15.2*   < > 22.8* 24.2*     --   --   --     < > = values in this interval not displayed.      BMP:   Recent Labs 12/21/21  0553 12/22/21  0731    138   K 3.6* 3.8   CO2 23 23   BUN 4* 7*   CREATININE 0.43* 0.51*   LABGLOM >60 >60   GLUCOSE 166* 115*     PT/INR: No results for input(s): PROTIME, INR in the last 72 hours. IMAGING DATA:      Primary Problem  Symptomatic anemia    Active Hospital Problems    Diagnosis Date Noted    Esophageal adenocarcinoma ([facility] [C15.9]     Low hemoglobin [D64.9] 12/20/2021    Symptomatic anemia, microcytic, acute [D64.9] 12/20/2021    Hypotension [I95.9] 12/20/2021    Former smoker, 50+ pack years, quit 2016 [Z87.891] 12/20/2021    HLD (hyperlipidemia) [E78.5] 12/20/2021    Spinal stenosis of lumbar region with neurogenic claudication [M48.062] 12/14/2021    Anxiety [F41.9] 02/23/2021    Hepatitis C virus infection resolved after antiviral drug therapy [Z86.19] 12/23/2020    Hypokalemia [E87.6] 02/04/2019    Depression [F32.A] 10/13/2016    Hepatic cirrhosis ([facility] [K74.60] 09/15/2016    Hyponatremia [E87.1] 07/20/2016    Hearing difficulty [H91.90] 04/19/2012         IMPRESSION:   1. Alcoholic cirrhosis   2. Hx of esophageal varices   3. GI bleeding  4. Hx pf esophageal cancer, post chemo XRT       RECOMMENDATIONS:  1. Hemoglobin is up after transfusion  2. Continue with IV iron  3. Needs GI work-up, await GI input  4. Watch hemoglobin, if there is no further drop, the work-up can be done as an outpatient      Discussed with patient and Nurse. Thank you for asking us to see this patient.     DHARMESH ALATORRELouisville Medical Center MD [place]  Hematologist/Medical Oncologist  Cell: [phone]

## 2021-12-22 NOTE — PLAN OF CARE
Problem: Pain:  Goal: Pain level will decrease  Description: Pain level will decrease  Outcome: Ongoing  Note: Pt given medication to help decrease pain. See MAR. Problem: Falls - Risk of:  Goal: Will remain free from falls  Description: Will remain free from falls  Outcome: Ongoing  Note: Pt free of falls. Call light and bedside table within reach. Bed locked and in lowest position, nonskid socks on feet.       Problem: Falls - Risk of:  Goal: Absence of physical injury  Description: Absence of physical injury  Outcome: Ongoing  Note: Pt absent of any physical injury

## 2021-12-22 NOTE — H&P
History and Physical    Patient:  Yan Kendall  MRN: [id number]    CHIEF COMPLAINT:  anemia    History Obtained From:  patient, electronic medical record  PCP: VASU Verduzco    HISTORY OF PRESENT ILLNESS:   The patient is a 57 y.o. male who presents with anemia- Hb 4.2- got 3 units of blood and 1 dose of IV iron and is still c/o fatigue but is better. C/o back pain as well.      Past Medical History:        Diagnosis Date    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 Slava Jimenez, orthopedic, every 3-4 months, gets steroid injection    Lezama esophagus     BPH (benign prostatic hypertrophy)     Cholelithiasis     Cirrhosis ([address]     COVID-19 12/2020    pt reports he had a positive test while at [facility] in 2020, was asymptomatic    COVID-19 vaccine series completed 5/20/2021, 6/22/2021    Moderna 5/20/2021, 6/22/2021    DDD (degenerative disc disease), lumbar     Depression     Esophageal cancer ([address]     INVASIVE ADENOCARCINOMA ARISING IN TUBULAR ADENOMA WITH HIGH GRADE DYSPLASIA, ASSOCIATED WITH FOCAL INTESTINAL METAPLASIA     Esophageal varices ([address]     Fatty liver     GERD (gastroesophageal reflux disease)     Hep C w/o coma, chronic ([address]     History of alcohol abuse     6-12 beers a day; quit drinking July 2016    History of blood transfusion     History of colon polyps 2016    History of tobacco abuse     [place] (hard of hearing)     Hyperlipidemia     Hypertension     Port-A-Cath in place     right upper chest    Stomach ulcer     hx of    Tubular adenoma of colon 2016, 2018    Vitamin D deficiency     Wears glasses        Past Surgical History:        Procedure Laterality Date    BUNIONECTOMY      twice on right side    BUNIONECTOMY Left     CARPAL TUNNEL RELEASE Right     COLONOSCOPY      at age 35    COLONOSCOPY  10/05/2016    polyps-pathology tubular adenoma, and abnormal looking mucosa right colon-pathology-tubular adenoma  COLONOSCOPY N/A 3/30/2018    COLONOSCOPY POLYPECTOMY COLD BIOPSY performed by Darien Plascencia MD at [address]  03/30/2018    Small polyp in the sigmoid colon and excised with biopsy forceps--tubular adenoma    ENDOSCOPY, COLON, DIAGNOSTIC      EGD    IR PORT PLACEMENT EQUAL OR GREATER THAN 5 YEARS  4/19/2021    IR PORT PLACEMENT EQUAL OR GREATER THAN 5 YEARS 4/19/2021 [facility] PROCED[facility]    KNEE SURGERY Left     cyst removed    NASAL SEPTUM SURGERY      NERVE BLOCK Right 11/23/2020    NERVE BLOCK RIGHT CERVICAL STEROID INJECTION  C3-C6 performed by Lindbergh Dakin, MD at [facility]  01/04/16    steroid injection C7 T1    OTHER SURGICAL HISTORY  11/21/2016    Bilateral Lumbar CACHORRO L4-L5 injections    OTHER SURGICAL HISTORY  12/19/2016    lumbar steroid injection    OTHER SURGICAL HISTORY  09/28/2018    BILATERAL L5 CACHORRO (N/A Back)    OTHER SURGICAL HISTORY Right 11/23/2020    cervical injection    PAIN MANAGEMENT PROCEDURE Left 7/9/2020    EPIDURAL STEROID INJECTION LEFT L4 L5 performed by Lindbergh Dakin, MD at [facility] Left 7/20/2020    LEFT L4 L5 EPIDURAL STEROID INJECTION performed by Lindbergh Dakin, MD at [facility] Bilateral 8/17/2020    LUMBAR FACET BILATERAL L2-L5 performed by Lindbergh Dakin, MD at [facility] Bilateral 12/7/2020    [facility] L2-L5 performed by Lindbergh Dakin, MD at [address] AA&/STRD TFRML EPI LUMBAR/SACRAL 1 LEVEL Bilateral 9/6/2018    BILATERAL L5 CACHORRO performed by Lindbergh Dakin, MD at [address] AA&/STRD TFRML EPI LUMBAR/SACRAL 1 LEVEL N/A 9/28/2018    BILATERAL L5 CACHORRO performed by Lindbergh Dakin, MD at [address] [facility] N/CARPAL [address] Right 8/29/2017    CARPAL TUNNEL RELEASE RIGHT performed by Lyndon Gross MD at [address] [facility] N/CARPAL TUNNEL SURG Left 10/31/2017    CARPAL TUNNEL RELEASE performed by Mary Joseph MD at [facility] N/A 12/29/2020    EGD BIOPSY performed by Diana Mccurdy MD at [facility] 2/2/2021    EGD BIOPSY and spot marking performed by Jennifer Barlow MD at [facility] N/A 2/12/2021    ENDOSCOPIC ULTRASOUND, EGD performed by Jeniffer Menchaca MD at [address] [place] [address]  2/12/2021    EGD DIAGNOSTIC ONLY performed by Jeniffer Menchaca MD at [id number] N/A 8/31/2021    EGD BIOPSY performed by Jennifer Barlow MD at [facility]         Medications Prior to Admission:    Prior to Admission medications    Medication Sig Start Date End Date Taking? Authorizing Provider   oxyCODONE-acetaminophen (PERCOCET) 5-325 MG per tablet Take 1 tablet by mouth daily as needed for Pain for up to 30 days.  12/14/21 1/13/22 Yes Guanaco Chambers MD   omeprazole (PRILOSEC) 40 MG delayed release capsule take 1 capsule by mouth EVERY MORNING BEFORE BREAKFAST 11/12/21  Yes Nikunj Marroquin MD   FLUoxetine (PROZAC) 20 MG capsule take 1 capsule by mouth once daily 11/12/21  Yes VASU Maldonado   lidocaine-prilocaine (EMLA) 2.5-2.5 % cream Apply topically 45-60 mins prior to needle poke daily PRN 4/22/21  Yes Nikunj Marroquin MD   hydrOXYzine (VISTARIL) 25 MG capsule take 1 capsule by mouth three times a day if needed for anxiety 2/23/21  Yes MASSIMO Coleman CNP   atorvastatin (LIPITOR) 20 MG tablet take 1 tablet by mouth at bedtime 2/23/21  Yes MASSIMO Coleman CNP   spironolactone (ALDACTONE) 50 MG tablet take 1 tablet by mouth once daily 2/23/21  Yes MASSIMO Yo CNP   Handicap Placard MISC by Does not apply route 12/14/21   Guanaco Chambers MD       Allergies:  Bee pollen and Pollen extract    Social History:   TOBACCO:   reports that he quit smoking about 4 years ago. He has a 45.00 pack-year smoking history. He has never used smokeless tobacco.  ETOH:   reports previous alcohol use. OCCUPATION:      Family History:       Problem Relation Age of Onset   [facility] St[facility] Cancer Mother         pancreatic    Cancer Father         bone    Diabetes Sister     Asthma Brother        REVIEW OF SYSTEMS:  Negative except for as above. No fever, chills, h/a. No nausea or vomiting- did have decreased appetite recently but that is better since got the blood. Physical Exam:    Vitals: /73   Pulse 66   Temp 97.2 °F (36.2 °C) (Oral)   Resp 18   Ht 5' 10\" (1.778 m)   Wt 225 lb 12 oz (102.4 kg)   SpO2 98%   BMI 32.39 kg/m²   General appearance: alert, appears stated age and cooperative  Skin: Skin color, texture, turgor normal. No rashes or lesions  HEENT: Head: Normocephalic, no lesions, without obvious abnormality. Neck: supple, symmetrical, trachea midline  Lungs: clear to auscultation bilaterally  Heart: regular rate and rhythm, S1, S2 normal, no murmur, click, rub or gallop  Abdomen: normal findings: soft, non-tender  Extremities: extremities normal, atraumatic, no cyanosis or edema  Neurologic: Mental status: Alert, oriented, thought content appropriate    CBC:   Recent Labs     12/20/21  1620 12/20/21  2142 12/21/21  1525 12/21/21  2303 12/22/21  0731   WBC 4.8  --   --   --   --    HGB 4.2*   < > 5.8* 6.9* 7.5*     --   --   --   --     < > = values in this interval not displayed. BMP:    Recent Labs     12/20/21  1620 12/21/21  0553 12/22/21  0731   * 137 138   K 3.4* 3.6* 3.8   CL 98 104 107   CO2 24 23 23   BUN 5* 4* 7*   CREATININE 0.59* 0.43* 0.51*   GLUCOSE 168* 166* 115*     Hepatic:   Recent Labs     12/20/21  1620   AST 15   ALT 7   BILITOT 1.91*   ALKPHOS 105     Troponin: No results for input(s): TROPONINI in the last 72 hours. BNP: No results for input(s): BNP in the last 72 hours.   Lipids: No results for input(s): CHOL, HDL in the last 72 hours.    Invalid input(s): LDLCALCU  ABGs: No results found for: PHART, PO2ART, RYG5ZND  INR: No results for input(s): INR in the last 72 hours. -----------------------------------------------------------------  PA/lat CXR: -  EKG: -    Assessment and Plan   1. Anemia- capsule endoscopy as OP. Had EGD and colonoscopy done prior. IV iron x3 doses- after last dose could d/c home if okay with others. F/u with hematolgy/ oncology as OP  2. Cirrhosis- f/u with GI at [facility] as OP for capsule endoscopy and further treatment. Patient Active Problem List   Diagnosis Code    Back pain, chronic M54.9, G89.29    Hearing difficulty H91.90    GERD (gastroesophageal reflux disease) K21.9    Cervical radicular pain M54.12    Alcohol withdrawal syndrome without complication (HCC) O54.891    Hyponatremia E87.1    Hypomagnesemia E83.42    Chronic viral hepatitis B without delta agent and without coma (HCC) B18.1    Calculus of gallbladder without cholecystitis K80.20    Hep C w/o coma, chronic (HCC) B18.2    Fatty liver K76.0    Psychophysiologic insomnia F51.04    Cirrhosis (HCC) K74.60    Hepatic cirrhosis (HCC) K74.60    Vertebrogenic low back pain M54.51    DDD (degenerative disc disease), lumbar M51.36    Depression F32. A    Tubular adenoma of colon D12.6    History of colon polyps Z86.010    Gynecomastia, male N58    Lumbar radiculitis M54.16    Lumbar disc herniation M51.26    Tinnitus H93.19    Eustachian tube dysfunction H69.80    Ganglion cyst M67.40    Carpal tunnel syndrome of right wrist G56.01    History of hepatitis C Z86.19    Vitamin D deficiency E55.9    Pure hypercholesterolemia E78.00    Hypokalemia E87.6    Essential hypertension I10    Recurrent major depressive disorder in partial remission (HCC) F33.41    S/P epidural steroid injection Z92.241    Elevated LFTs R79.89    Seasonal allergies J30.2    Lumbar facet arthropathy M47.816    Cervical facet syndrome M47.812    Acute gastrointestinal bleeding K92.2    Thrombocytopenia (HCC) D69.6    Hepatitis C virus infection resolved after antiviral drug therapy Z86.19    Gastrointestinal hemorrhage with melena K92.1    Alcohol abuse F10.10    Altered mental status R41.82    Hypocalcemia E83.51    Hypophosphatemia E83.39    Malignant neoplasm of lower third of esophagus (HCC) C15.5    COVID-19 U07.1    Anxiety F41.9    Current smoker F17.200    Severe comorbid illness R69    Gait instability R26.81    Abnormal findings on diagnostic imaging of spine R93.7    Cervical spinal stenosis M48.02    Spinal stenosis of lumbar region with neurogenic claudication M48.062    Low hemoglobin D64.9    Symptomatic anemia, microcytic, acute D64.9    Hypotension I95.9    Former smoker, 50+ pack years, quit 2016 Z87.891    HLD (hyperlipidemia) E78.5    Esophageal adenocarcinoma ([facility] C15.9       Electronically signed by Jung Saleem MD on 12/22/2021 at 1:29 PM

## 2021-12-22 NOTE — PROGRESS NOTES
(FLUZONE;FLUARIX;FLULAVAL;AFLURIA) injection 0.5 mL, Prior to discharge        Data:     Code Status:  Full Code    Family History   Problem Relation Age of Onset   [facility] Cancer Mother         pancreatic    Cancer Father         bone    Diabetes Sister     Asthma Brother        Social History     Socioeconomic History    Marital status: Single     Spouse name: Not on file    Number of children: Not on file    Years of education: Not on file    Highest education level: Not on file   Occupational History    Not on file   Tobacco Use    Smoking status: Former Smoker     Packs/day: 1.00     Years: 45.00     Pack years: 45.00     Quit date: 2017     Years since quittin.9    Smokeless tobacco: Never Used   Vaping Use    Vaping Use: Never used   Substance and Sexual Activity    Alcohol use: Not Currently     Comment: quit     Drug use: Not Currently     Frequency: 1.0 times per week     Comment: cocaine,  stopped spring 2016    Sexual activity: Yes     Partners: Female   Other Topics Concern    Not on file   Social History Narrative     in the past, retired     Social Determinants of Health     Financial Resource Strain: [address] Difficulty of Paying Living Expenses: Not hard at all   Food Insecurity: No Food Insecurity    Worried About [facility] in the Last Year: Never true   [address] in the Last Year: Never true   Transportation Needs: No Transportation Needs    Lack of Transportation (Medical): No    Lack of Transportation (Non-Medical):  No   Physical Activity:     Days of Exercise per Week: Not on file    Minutes of Exercise per Session: Not on file   Stress:     Feeling of Stress : Not on file   Social Connections:     Frequency of Communication with Friends and Family: Not on file    Frequency of Social Gatherings with Friends and Family: Not on file    Attends Jew Services: Not on file    Active Member of Clubs or Organizations: Not on file  Attends Club or Organization Meetings: Not on file    Marital Status: Not on file   Intimate Partner Violence:     Fear of Current or Ex-Partner: Not on file    Emotionally Abused: Not on file    Physically Abused: Not on file    Sexually Abused: Not on file   Housing Stability:     Unable to Pay for Housing in the Last Year: Not on file    Number of Mic in the Last Year: Not on file    Unstable Housing in the Last Year: Not on file       Vitals:  /73   Pulse 66   Temp 97.2 °F (36.2 °C) (Oral)   Resp 18   Ht 5' 10\" (1.778 m)   Wt 225 lb 12 oz (102.4 kg)   SpO2 98%   BMI 32.39 kg/m²   Temp (24hrs), Av.9 °F (36.6 °C), Min:97.2 °F (36.2 °C), Max:98.5 °F (36.9 °C)    No results for input(s): POCGLU in the last 72 hours. I/O (24Hr):     Intake/Output Summary (Last 24 hours) at 2021 1348  Last data filed at 2021 1302  Gross per 24 hour   Intake 2040 ml   Output --   Net 2040 ml       Labs:      CBC:   Lab Results   Component Value Date    WBC 4.8 2021    RBC 2.30 2021    RBC 4.75 2012    HGB 7.5 2021    HCT 24.2 2021    MCV 66.0 2021    MCH 18.3 2021    MCHC 27.7 2021    RDW 19.4 2021     2021     2012    MPV 7.5 2021     CBC with Differential:    Lab Results   Component Value Date    WBC 4.8 2021    RBC 2.30 2021    RBC 4.75 2012    HGB 7.5 2021    HCT 24.2 2021     2021     2012    MCV 66.0 2021    MCH 18.3 2021    MCHC 27.7 2021    RDW 19.4 2021    NRBC 2 2021    LYMPHOPCT 3 2021    MONOPCT 3 2021    MYELOPCT 1 2021    BASOPCT 0 2021    MONOSABS 0.14 2021    LYMPHSABS 0.14 2021    EOSABS 0.00 2021    BASOSABS 0.00 2021    DIFFTYPE NOT REPORTED 2021     Hemoglobin/Hematocrit:    Lab Results   Component Value Date    HGB 7.5 2021    HCT 24.2 [K74.60] 09/15/2016    Hyponatremia [E87.1] 07/20/2016    Hearing difficulty [H91.90] 04/19/2012     Past Medical History:   Diagnosis Date    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 [facility]     Anxiety     Arthritis     Back pain, chronic     dr. Vu Ramos, orthopedic, every 3-4 months, gets steroid injection    Lezama esophagus     BPH (benign prostatic hypertrophy)     Cholelithiasis     Cirrhosis ([address]     COVID-19 12/2020    pt reports he had a positive test while at [facility] in [facility], was asymptomatic    COVID-19 vaccine series completed 5/20/2021, 6/22/2021    Moderna 5/20/2021, 6/22/2021    DDD (degenerative disc disease), lumbar     Depression     Esophageal cancer ([facility]     INVASIVE ADENOCARCINOMA ARISING IN TUBULAR ADENOMA WITH HIGH GRADE DYSPLASIA, ASSOCIATED WITH FOCAL INTESTINAL METAPLASIA     Esophageal varices ([address]     Fatty liver     GERD (gastroesophageal reflux disease)     Hep C w/o coma, chronic ([address]     History of alcohol abuse     6-12 beers a day; quit drinking July 2016    History of blood transfusion     History of colon polyps 2016    History of tobacco abuse     [place] (hard of hearing)     Hyperlipidemia     Hypertension     Port-A-Cath in place     right upper chest    Stomach ulcer     hx of    Tubular adenoma of colon 2016, 2018    Vitamin D deficiency     Wears glasses         Plan:        1. Anemia, FOBT -, hx of esophageal cancer  1. Hgb stable  2. Outpatient workup  3. Recommended patient follow-up at [facility] for possible RFA  4. PPI BID  5. Alcohol abstinence  6. May d/c per GI  7. GI signing off.     Explained to the patient and d/W Nursing Staff  Will F/U with you  Please call or Page for any issues or change in status  Thanks    Electronically signed by MASSIMO Lambert NP on 12/22/2021 at 1:48 PM

## 2021-12-23 VITALS
SYSTOLIC BLOOD PRESSURE: 119 MMHG | DIASTOLIC BLOOD PRESSURE: 66 MMHG | HEIGHT: 70 IN | OXYGEN SATURATION: 94 % | TEMPERATURE: 97.7 F | HEART RATE: 62 BPM | BODY MASS INDEX: 33.49 KG/M2 | RESPIRATION RATE: 16 BRPM | WEIGHT: 233.91 LBS

## 2021-12-23 LAB
A1 LECTIN: NEGATIVE
ABO/RH: NORMAL
ANION GAP SERPL CALCULATED.3IONS-SCNC: 8 MMOL/L (ref 9–17)
ANTIBODY SCREEN: NEGATIVE
ARM BAND NUMBER: NORMAL
BLD PROD TYP BPU: NORMAL
BLOOD BANK COMMENT: NORMAL
BUN BLDV-MCNC: 8 MG/DL (ref 8–23)
BUN/CREAT BLD: ABNORMAL (ref 9–20)
CALCIUM SERPL-MCNC: 8 MG/DL (ref 8.6–10.4)
CHLORIDE BLD-SCNC: 110 MMOL/L (ref 98–107)
CO2: 22 MMOL/L (ref 20–31)
CREAT SERPL-MCNC: 0.53 MG/DL (ref 0.7–1.2)
CROSSMATCH RESULT: NORMAL
DAT, POLYSPECIFIC: NEGATIVE
DISPENSE STATUS BLOOD BANK: NORMAL
EXPIRATION DATE: NORMAL
GFR AFRICAN AMERICAN: >60 ML/MIN
GFR NON-AFRICAN AMERICAN: >60 ML/MIN
GFR SERPL CREATININE-BSD FRML MDRD: ABNORMAL ML/MIN/{1.73_M2}
GFR SERPL CREATININE-BSD FRML MDRD: ABNORMAL ML/MIN/{1.73_M2}
GLUCOSE BLD-MCNC: 94 MG/DL (ref 70–99)
HCT VFR BLD CALC: 21.7 % (ref 41–53)
HCT VFR BLD CALC: 23.4 % (ref 41–53)
HCT VFR BLD CALC: 23.7 % (ref 41–53)
HEMOGLOBIN: 6.5 G/DL (ref 13.5–17.5)
HEMOGLOBIN: 7 G/DL (ref 13.5–17.5)
HEMOGLOBIN: 7.1 G/DL (ref 13.5–17.5)
POTASSIUM SERPL-SCNC: 3.6 MMOL/L (ref 3.7–5.3)
SODIUM BLD-SCNC: 140 MMOL/L (ref 135–144)
TRANSFUSION STATUS: NORMAL
UNIT DIVISION: 0
UNIT NUMBER: NORMAL

## 2021-12-23 PROCEDURE — G0008 ADMIN INFLUENZA VIRUS VAC: HCPCS | Performed by: FAMILY MEDICINE

## 2021-12-23 PROCEDURE — 99238 HOSP IP/OBS DSCHRG MGMT 30/<: CPT | Performed by: FAMILY MEDICINE

## 2021-12-23 PROCEDURE — 2580000003 HC RX 258

## 2021-12-23 PROCEDURE — C9113 INJ PANTOPRAZOLE SODIUM, VIA: HCPCS | Performed by: STUDENT IN AN ORGANIZED HEALTH CARE EDUCATION/TRAINING PROGRAM

## 2021-12-23 PROCEDURE — 36415 COLL VENOUS BLD VENIPUNCTURE: CPT

## 2021-12-23 PROCEDURE — 6360000002 HC RX W HCPCS: Performed by: STUDENT IN AN ORGANIZED HEALTH CARE EDUCATION/TRAINING PROGRAM

## 2021-12-23 PROCEDURE — 85014 HEMATOCRIT: CPT

## 2021-12-23 PROCEDURE — 6370000000 HC RX 637 (ALT 250 FOR IP)

## 2021-12-23 PROCEDURE — 6370000000 HC RX 637 (ALT 250 FOR IP): Performed by: FAMILY MEDICINE

## 2021-12-23 PROCEDURE — 6370000000 HC RX 637 (ALT 250 FOR IP): Performed by: STUDENT IN AN ORGANIZED HEALTH CARE EDUCATION/TRAINING PROGRAM

## 2021-12-23 PROCEDURE — 85018 HEMOGLOBIN: CPT

## 2021-12-23 PROCEDURE — 90686 IIV4 VACC NO PRSV 0.5 ML IM: CPT | Performed by: FAMILY MEDICINE

## 2021-12-23 PROCEDURE — 6360000002 HC RX W HCPCS: Performed by: INTERNAL MEDICINE

## 2021-12-23 PROCEDURE — 80048 BASIC METABOLIC PNL TOTAL CA: CPT

## 2021-12-23 PROCEDURE — 2580000003 HC RX 258: Performed by: INTERNAL MEDICINE

## 2021-12-23 PROCEDURE — 6360000002 HC RX W HCPCS: Performed by: NURSE PRACTITIONER

## 2021-12-23 PROCEDURE — 6360000002 HC RX W HCPCS: Performed by: FAMILY MEDICINE

## 2021-12-23 PROCEDURE — 2580000003 HC RX 258: Performed by: NURSE PRACTITIONER

## 2021-12-23 PROCEDURE — 2580000003 HC RX 258: Performed by: STUDENT IN AN ORGANIZED HEALTH CARE EDUCATION/TRAINING PROGRAM

## 2021-12-23 RX ORDER — PANTOPRAZOLE SODIUM 40 MG/1
40 TABLET, DELAYED RELEASE ORAL
Qty: 60 TABLET | Refills: 5 | Status: ON HOLD | OUTPATIENT
Start: 2021-12-23 | End: 2022-01-21

## 2021-12-23 RX ORDER — OXYCODONE HYDROCHLORIDE AND ACETAMINOPHEN 5; 325 MG/1; MG/1
1 TABLET ORAL DAILY PRN
Qty: 30 TABLET | Refills: 0 | Status: ON HOLD
Start: 2021-12-23 | End: 2022-01-21 | Stop reason: ALTCHOICE

## 2021-12-23 RX ADMIN — OCTREOTIDE ACETATE 25 MCG/HR: 500 INJECTION, SOLUTION INTRAVENOUS; SUBCUTANEOUS at 06:16

## 2021-12-23 RX ADMIN — OXYCODONE HYDROCHLORIDE AND ACETAMINOPHEN 1 TABLET: 5; 325 TABLET ORAL at 09:52

## 2021-12-23 RX ADMIN — PANTOPRAZOLE SODIUM 8 MG/HR: 40 INJECTION, POWDER, FOR SOLUTION INTRAVENOUS at 06:17

## 2021-12-23 RX ADMIN — OXYCODONE HYDROCHLORIDE AND ACETAMINOPHEN 1 TABLET: 5; 325 TABLET ORAL at 16:10

## 2021-12-23 RX ADMIN — SODIUM CHLORIDE: 9 INJECTION, SOLUTION INTRAVENOUS at 05:09

## 2021-12-23 RX ADMIN — FLUOXETINE HYDROCHLORIDE 20 MG: 20 CAPSULE ORAL at 08:28

## 2021-12-23 RX ADMIN — SPIRONOLACTONE 50 MG: 25 TABLET, FILM COATED ORAL at 08:28

## 2021-12-23 RX ADMIN — IRON SUCROSE 300 MG: 20 INJECTION, SOLUTION INTRAVENOUS at 14:55

## 2021-12-23 RX ADMIN — IRON SUCROSE 300 MG: 20 INJECTION, SOLUTION INTRAVENOUS at 09:18

## 2021-12-23 RX ADMIN — INFLUENZA A VIRUS A/VICTORIA/2570/2019 IVR-215 (H1N1) ANTIGEN (PROPIOLACTONE INACTIVATED), INFLUENZA A VIRUS A/CAMBODIA/E0826360/2020 IVR-224 (H3N2) ANTIGEN (PROPIOLACTONE INACTIVATED), INFLUENZA B VIRUS B/VICTORIA/705/2018 BVR-11 ANTIGEN (PROPIOLACTONE INACTIVATED), INFLUENZA B VIRUS B/PHUKET/3073/2013 BVR-1B ANTIGEN (PROPIOLACTONE INACTIVATED) 0.5 ML: 15; 15; 15; 15 INJECTION, SUSPENSION INTRAMUSCULAR at 14:43

## 2021-12-23 RX ADMIN — OXYCODONE HYDROCHLORIDE AND ACETAMINOPHEN 1 TABLET: 5; 325 TABLET ORAL at 03:55

## 2021-12-23 ASSESSMENT — PAIN DESCRIPTION - FREQUENCY: FREQUENCY: CONTINUOUS

## 2021-12-23 ASSESSMENT — PAIN DESCRIPTION - DESCRIPTORS: DESCRIPTORS: ACHING

## 2021-12-23 ASSESSMENT — PAIN SCALES - GENERAL
PAINLEVEL_OUTOF10: 8
PAINLEVEL_OUTOF10: 4
PAINLEVEL_OUTOF10: 8
PAINLEVEL_OUTOF10: 8

## 2021-12-23 ASSESSMENT — PAIN - FUNCTIONAL ASSESSMENT: PAIN_FUNCTIONAL_ASSESSMENT: PREVENTS OR INTERFERES SOME ACTIVE ACTIVITIES AND ADLS

## 2021-12-23 ASSESSMENT — PAIN DESCRIPTION - PROGRESSION: CLINICAL_PROGRESSION: NOT CHANGED

## 2021-12-23 ASSESSMENT — PAIN DESCRIPTION - PAIN TYPE: TYPE: CHRONIC PAIN

## 2021-12-23 ASSESSMENT — PAIN DESCRIPTION - LOCATION: LOCATION: BACK

## 2021-12-23 ASSESSMENT — PAIN DESCRIPTION - ORIENTATION: ORIENTATION: LOWER

## 2021-12-23 ASSESSMENT — PAIN DESCRIPTION - ONSET: ONSET: ON-GOING

## 2021-12-23 NOTE — PROGRESS NOTES
RN agrees with ashleigh mckeon charting.   Electronically signed by Nakia Grullon RN on 12/22/2021 at 7:00 PM

## 2021-12-23 NOTE — PROGRESS NOTES
Physical Therapy        Physical Therapy Cancel Note      DATE: 2021    NAME: Luiza Montes  MRN: [id number]   : 1960      Patient not seen this date for Physical Therapy due to: Other:  Pt is soundly asleep with pillow over head and not responding to calling pt's name. Pt will be going home this afternoon per JERI Obrien.       Electronically signed by Anupam Saavedra PTA on 2021 at 11:02 AM

## 2021-12-23 NOTE — PROGRESS NOTES
Progress Note    12/23/2021 7:56 AM    Subjective:     Chief Complaint   Patient presents with    Abnormal Lab       Interval History: Pt without complaint. No chest pain. No rectal bleed. Diet: ADULT DIET; Regular    Medications:   Reviewed medications    Labs:   CBC:   Recent Labs     12/20/21  1620 12/20/21  2142 12/23/21  0528   WBC 4.8  --   --    HGB 4.2*   < > 7.0*     --   --     < > = values in this interval not displayed. BMP:    Recent Labs     12/23/21  0528      K 3.6*   *   CO2 22   BUN 8   CREATININE 0.53*   GLUCOSE 94     Hepatic:   Recent Labs     12/20/21  1620   AST 15   ALT 7   BILITOT 1.91*   ALKPHOS 105     Troponin: No results for input(s): TROPONINI in the last 72 hours. BNP: No results for input(s): BNP in the last 72 hours. Lipids: No results for input(s): CHOL, HDL in the last 72 hours. Invalid input(s): LDLCALCU  INR: No results for input(s): INR in the last 72 hours.     CBC with Differential:    Lab Results   Component Value Date    WBC 4.8 12/20/2021    RBC 2.30 12/20/2021    RBC 4.75 04/19/2012    HGB 7.0 12/23/2021    HCT 23.4 12/23/2021     12/20/2021     04/19/2012    MCV 66.0 12/20/2021    MCH 18.3 12/20/2021    MCHC 27.7 12/20/2021    RDW 19.4 12/20/2021    NRBC 2 06/08/2021    LYMPHOPCT 3 12/20/2021    MONOPCT 3 12/20/2021    MYELOPCT 1 06/01/2021    BASOPCT 0 12/20/2021    MONOSABS 0.14 12/20/2021    LYMPHSABS 0.14 12/20/2021    EOSABS 0.00 12/20/2021    BASOSABS 0.00 12/20/2021    DIFFTYPE NOT REPORTED 12/20/2021     BMP:    Lab Results   Component Value Date     12/23/2021    K 3.6 12/23/2021     12/23/2021    CO2 22 12/23/2021    BUN 8 12/23/2021    LABALBU 3.1 12/20/2021    LABALBU 4.7 04/19/2012    CREATININE 0.53 12/23/2021    CALCIUM 8.0 12/23/2021    GFRAA >60 12/23/2021    LABGLOM >60 12/23/2021    GLUCOSE 94 12/23/2021    GLUCOSE 65 04/19/2012       Objective:   Vitals: /67   Pulse 67   Temp 97.3 °F (36.3 °C) (Axillary)   Resp 16   Ht 5' 10\" (1.778 m)   Wt 233 lb 14.5 oz (106.1 kg)   SpO2 96%   BMI 33.56 kg/m²   General appearance: alert and cooperative with exam  Neck: no adenopathy, no carotid bruit, no JVD, supple, symmetrical, trachea midline and thyroid not enlarged, symmetric, no tenderness/mass/nodules  Lungs: clear to auscultation bilaterally  Heart: regular rate and rhythm, S1, S2 normal, no murmur, click, rub or gallop  Abdomen: soft, non-tender; bowel sounds normal; no masses,  no organomegaly  Extremities: extremities normal, atraumatic, no cyanosis or edema  Neurologic: Mental status: Alert, oriented, thought content appropriate    Assessment and Plan:   1. GI bleed- chronic. Stable at present. 2. Received IV iron. 3. Dc home. Follow up GI as outpatient for scoping.   4. PPI bid     Patient Active Problem List:     Back pain, chronic     Hearing difficulty     GERD (gastroesophageal reflux disease)     Cervical radicular pain     Alcohol withdrawal syndrome without complication (HCC)     Hyponatremia     Hypomagnesemia     Chronic viral hepatitis B without delta agent and without coma (HCC)     Calculus of gallbladder without cholecystitis     Hep C w/o coma, chronic (HCC)     Fatty liver     Psychophysiologic insomnia     Cirrhosis (HCC)     Hepatic cirrhosis (HCC)     Vertebrogenic low back pain     DDD (degenerative disc disease), lumbar     Depression     Tubular adenoma of colon     History of colon polyps     Gynecomastia, male     Lumbar radiculitis     Lumbar disc herniation     Tinnitus     Eustachian tube dysfunction     Ganglion cyst     Carpal tunnel syndrome of right wrist     History of hepatitis C     Vitamin D deficiency     Pure hypercholesterolemia     Hypokalemia     Essential hypertension     Recurrent major depressive disorder in partial remission (HCC)     S/P epidural steroid injection     Elevated LFTs     Seasonal allergies     Lumbar facet arthropathy     Cervical facet syndrome     Acute gastrointestinal bleeding     Thrombocytopenia (HCC)     Hepatitis C virus infection resolved after antiviral drug therapy     Gastrointestinal hemorrhage with melena     Alcohol abuse     Altered mental status     Hypocalcemia     Hypophosphatemia     Malignant neoplasm of lower third of esophagus (HCC)     COVID-19     Anxiety     Current smoker     Severe comorbid illness     Gait instability     Abnormal findings on diagnostic imaging of spine     Cervical spinal stenosis     Spinal stenosis of lumbar region with neurogenic claudication     Low hemoglobin     Symptomatic anemia, microcytic, acute     Hypotension     Former smoker, 50+ pack years, quit 2016     HLD (hyperlipidemia)     Esophageal adenocarcinoma ([facility] [address]      Electronically signed by Luzmaria Wagner MD on 12/23/2021 at 7:56 AM

## 2021-12-23 NOTE — PLAN OF CARE
Problem: Pain:  Goal: Pain level will decrease  Description: Pain level will decrease  12/23/2021 0251 by Layne Cifuentes RN  Outcome: Ongoing     Problem: Falls - Risk of:  Goal: Will remain free from falls  Description: Will remain free from falls  12/23/2021 0251 by Layne Cifuentes RN  Outcome: Ongoing     Problem: Musculor/Skeletal Functional Status  Goal: Highest potential functional level  12/23/2021 0251 by Layne Cifuentes RN  Outcome: Ongoing

## 2021-12-23 NOTE — PROGRESS NOTES
Per Dr. Daya Linton request RN spoke with pharmacy and IV iron doses will be ordered to be completed today with anticipated discharge later today. Dr. Beatrice Wilder updated via perfect serve of the plan.

## 2021-12-28 NOTE — DISCHARGE SUMMARY
Discharge Summary    Julio Hendrix  :  1960  MRN:  [id number]    Admit date:  2021  Discharge date:  2021    Admitting Physician:  Bobby Drake MD    PCP: VASU Mazariegos    Discharge Diagnoses:    Patient Active Problem List   Diagnosis    Back pain, chronic    Hearing difficulty    GERD (gastroesophageal reflux disease)    Cervical radicular pain    Alcohol withdrawal syndrome without complication ([address]    Hyponatremia    Hypomagnesemia    Chronic viral hepatitis B without delta agent and without coma ([address]    Calculus of gallbladder without cholecystitis    Hep C w/o coma, chronic (HCC)    Fatty liver    Psychophysiologic insomnia    Cirrhosis ([address]    Hepatic cirrhosis ([address]    Vertebrogenic low back pain    DDD (degenerative disc disease), lumbar    Depression    Tubular adenoma of colon    History of colon polyps    Gynecomastia, male    Lumbar radiculitis    Lumbar disc herniation    Tinnitus    Eustachian tube dysfunction    Ganglion cyst    Carpal tunnel syndrome of right wrist    History of hepatitis C    Vitamin D deficiency    Pure hypercholesterolemia    Hypokalemia    Essential hypertension    Recurrent major depressive disorder in partial remission ([address]    S/P epidural steroid injection    Elevated LFTs    Seasonal allergies    Lumbar facet arthropathy    Cervical facet syndrome    Acute gastrointestinal bleeding    Thrombocytopenia (HCC)    Hepatitis C virus infection resolved after antiviral drug therapy    Gastrointestinal hemorrhage with melena    Alcohol abuse    Altered mental status    Hypocalcemia    Hypophosphatemia    Malignant neoplasm of lower third of esophagus (HCC)    COVID-19    Anxiety    Current smoker    Severe comorbid illness    Gait instability    Abnormal findings on diagnostic imaging of spine    Cervical spinal stenosis    Spinal stenosis of lumbar region with neurogenic claudication    Low hemoglobin    Symptomatic anemia, microcytic, acute    Hypotension    Former smoker, 50+ pack years, quit 2016    HLD (hyperlipidemia)    Esophageal adenocarcinoma ([facility]       Discharged Condition:  good    Hospital Course:   Patient admitted for severe anemia. Patient was determined to have chronic blood loss anemia. Had no active bleeding while in the hospital.  Patient had received IV iron infusions without difficulty. Was put on a proton pump inhibitor twice daily. Patient was to follow-up with GI as an outpatient for scoping. Agus Odom Discharge Medications:         Medication List      START taking these medications    pantoprazole 40 MG tablet  Commonly known as: PROTONIX  Take 1 tablet by mouth 2 times daily (before meals)        CONTINUE taking these medications    atorvastatin 20 MG tablet  Commonly known as: LIPITOR  take 1 tablet by mouth at bedtime     FLUoxetine 20 MG capsule  Commonly known as: PROZAC  take 1 capsule by mouth once daily     hydrOXYzine 25 MG capsule  Commonly known as: VISTARIL  take 1 capsule by mouth three times a day if needed for anxiety     lidocaine-prilocaine 2.5-2.5 % cream  Commonly known as: EMLA  Apply topically 45-60 mins prior to needle poke daily PRN     omeprazole 40 MG delayed release capsule  Commonly known as: PRILOSEC  take 1 capsule by mouth EVERY MORNING BEFORE BREAKFAST     oxyCODONE-acetaminophen 5-325 MG per tablet  Commonly known as: Percocet  Take 1 tablet by mouth daily as needed for Pain for up to 30 days.      spironolactone 50 MG tablet  Commonly known as: ALDACTONE  take 1 tablet by mouth once daily        STOP taking these medications    [facility]           Where to Get Your Medications      These medications were sent to [facility] [address]    Phone: [phone]   · pantoprazole 40 MG tablet     Information about where to get these medications is not yet available    Ask your nurse or doctor about these medications  · oxyCODONE-acetaminophen 5-325 MG per tablet         Significant Diagnostic Studies:      Radiology Review: Other diagnostic test:      Disposition:   home  Follow up with VASU Maldonado in 2 weeks.   Discharge patient to: Home    Electronically signed by Brittany Lopez MD on 12/28/2021 at 2:33 PM

## 2022-01-11 DIAGNOSIS — E55.9 VITAMIN D DEFICIENCY: Primary | ICD-10-CM

## 2022-01-11 RX ORDER — MELATONIN
1000 DAILY
Qty: 30 TABLET | Refills: 0 | Status: SHIPPED | OUTPATIENT
Start: 2022-01-11 | End: 2022-02-11

## 2022-01-12 ENCOUNTER — TELEPHONE (OUTPATIENT)
Dept: PRIMARY CARE CLINIC | Age: 62
End: 2022-01-12

## 2022-01-12 DIAGNOSIS — D64.9 ANEMIA, UNSPECIFIED TYPE: Primary | ICD-10-CM

## 2022-01-12 RX ORDER — FERROUS SULFATE 325(65) MG
325 TABLET ORAL 2 TIMES DAILY
Qty: 60 TABLET | Refills: 5 | Status: SHIPPED | OUTPATIENT
Start: 2022-01-12 | End: 2022-10-07

## 2022-01-20 ENCOUNTER — HOSPITAL ENCOUNTER (OUTPATIENT)
Age: 62
Discharge: HOME | End: 2022-01-20
Payer: MEDICARE

## 2022-01-20 ENCOUNTER — HOSPITAL ENCOUNTER (OUTPATIENT)
Dept: PREADMISSION TESTING | Age: 62
Discharge: HOME | End: 2022-01-24
Payer: MEDICARE

## 2022-01-20 ENCOUNTER — OFFICE VISIT (OUTPATIENT)
Dept: GASTROENTEROLOGY | Age: 62
End: 2022-01-20
Payer: MEDICARE

## 2022-01-20 VITALS — WEIGHT: 217 LBS | BODY MASS INDEX: 31.07 KG/M2 | HEIGHT: 70 IN

## 2022-01-20 VITALS
DIASTOLIC BLOOD PRESSURE: 92 MMHG | OXYGEN SATURATION: 99 % | TEMPERATURE: 97.2 F | HEART RATE: 110 BPM | WEIGHT: 217.2 LBS | BODY MASS INDEX: 31.09 KG/M2 | HEIGHT: 70 IN | SYSTOLIC BLOOD PRESSURE: 144 MMHG

## 2022-01-20 DIAGNOSIS — K76.9 LIVER DISEASE, CHRONIC: ICD-10-CM

## 2022-01-20 DIAGNOSIS — C15.5 MALIGNANT NEOPLASM OF LOWER THIRD OF ESOPHAGUS (HCC): ICD-10-CM

## 2022-01-20 DIAGNOSIS — K76.9 LIVER DISEASE, CHRONIC: Primary | ICD-10-CM

## 2022-01-20 DIAGNOSIS — K92.1 MELENA: ICD-10-CM

## 2022-01-20 LAB
ABSOLUTE EOS #: 0 K/UL (ref 0–0.4)
ABSOLUTE IMMATURE GRANULOCYTE: ABNORMAL K/UL (ref 0–0.3)
ABSOLUTE LYMPH #: 0.41 K/UL (ref 1–4.8)
ABSOLUTE MONO #: 0.68 K/UL (ref 0.1–1.3)
ALBUMIN SERPL-MCNC: 3.2 G/DL (ref 3.5–5.2)
ALBUMIN/GLOBULIN RATIO: ABNORMAL (ref 1–2.5)
ALP BLD-CCNC: 130 U/L (ref 40–129)
ALT SERPL-CCNC: 11 U/L (ref 5–41)
ANION GAP SERPL CALCULATED.3IONS-SCNC: 15 MMOL/L (ref 9–17)
AST SERPL-CCNC: 37 U/L
BASOPHILS # BLD: 0 % (ref 0–2)
BASOPHILS ABSOLUTE: 0 K/UL (ref 0–0.2)
BILIRUB SERPL-MCNC: 2.38 MG/DL (ref 0.3–1.2)
BUN BLDV-MCNC: 11 MG/DL (ref 8–23)
BUN/CREAT BLD: ABNORMAL (ref 9–20)
CALCIUM SERPL-MCNC: 8.9 MG/DL (ref 8.6–10.4)
CHLORIDE BLD-SCNC: 105 MMOL/L (ref 98–107)
CO2: 23 MMOL/L (ref 20–31)
CREAT SERPL-MCNC: 0.49 MG/DL (ref 0.7–1.2)
DIFFERENTIAL TYPE: ABNORMAL
EOSINOPHILS RELATIVE PERCENT: 0 % (ref 0–4)
GFR AFRICAN AMERICAN: >60 ML/MIN
GFR NON-AFRICAN AMERICAN: >60 ML/MIN
GFR SERPL CREATININE-BSD FRML MDRD: ABNORMAL ML/MIN/{1.73_M2}
GFR SERPL CREATININE-BSD FRML MDRD: ABNORMAL ML/MIN/{1.73_M2}
GLUCOSE BLD-MCNC: 98 MG/DL (ref 70–99)
HCT VFR BLD CALC: 30 % (ref 41–53)
HEMOGLOBIN: 9.4 G/DL (ref 13.5–17.5)
IMMATURE GRANULOCYTES: ABNORMAL %
INR BLD: 1.2
LIPASE: 10 U/L (ref 13–60)
LYMPHOCYTES # BLD: 6 % (ref 24–44)
MCH RBC QN AUTO: 27 PG (ref 26–34)
MCHC RBC AUTO-ENTMCNC: 31.5 G/DL (ref 31–37)
MCV RBC AUTO: 85.8 FL (ref 80–100)
MONOCYTES # BLD: 10 % (ref 1–7)
MORPHOLOGY: ABNORMAL
NRBC AUTOMATED: ABNORMAL PER 100 WBC
PDW BLD-RTO: 24 % (ref 11.5–14.9)
PLATELET # BLD: 135 K/UL (ref 150–450)
PLATELET ESTIMATE: ABNORMAL
PMV BLD AUTO: 7.2 FL (ref 6–12)
POTASSIUM SERPL-SCNC: 3.3 MMOL/L (ref 3.7–5.3)
PROTHROMBIN TIME: 15.3 SEC (ref 11.8–14.6)
RBC # BLD: 3.49 M/UL (ref 4.5–5.9)
RBC # BLD: ABNORMAL 10*6/UL
SEG NEUTROPHILS: 84 % (ref 36–66)
SEGMENTED NEUTROPHILS ABSOLUTE COUNT: 5.71 K/UL (ref 1.3–9.1)
SODIUM BLD-SCNC: 143 MMOL/L (ref 135–144)
TOTAL PROTEIN: 6.1 G/DL (ref 6.4–8.3)
WBC # BLD: 6.8 K/UL (ref 3.5–11)
WBC # BLD: ABNORMAL 10*3/UL

## 2022-01-20 PROCEDURE — 36415 COLL VENOUS BLD VENIPUNCTURE: CPT

## 2022-01-20 PROCEDURE — 83690 ASSAY OF LIPASE: CPT

## 2022-01-20 PROCEDURE — 99214 OFFICE O/P EST MOD 30 MIN: CPT | Performed by: INTERNAL MEDICINE

## 2022-01-20 PROCEDURE — 80053 COMPREHEN METABOLIC PANEL: CPT

## 2022-01-20 PROCEDURE — 85610 PROTHROMBIN TIME: CPT

## 2022-01-20 PROCEDURE — 85025 COMPLETE CBC W/AUTO DIFF WBC: CPT

## 2022-01-20 ASSESSMENT — ENCOUNTER SYMPTOMS
CHOKING: 0
BACK PAIN: 0
VOMITING: 0
ANAL BLEEDING: 1
SORE THROAT: 0
CONSTIPATION: 0
BLOOD IN STOOL: 1
ABDOMINAL DISTENTION: 1
DIARRHEA: 1
SHORTNESS OF BREATH: 0
RECTAL PAIN: 0
ABDOMINAL PAIN: 1
NAUSEA: 1
TROUBLE SWALLOWING: 0
VOICE CHANGE: 0
COUGH: 0
RESPIRATORY NEGATIVE: 1
ALLERGIC/IMMUNOLOGIC NEGATIVE: 1

## 2022-01-20 NOTE — PROGRESS NOTES
Pre-op Instructions For Out-Patient Endoscopy Surgery    Medication Instructions:  · Please stop herbs and any supplements now (includes vitamins and minerals). · Please contact your surgeon and prescribing physician for pre-op instructions for any blood thinners. · If you have inhalers/aerosol treatments at home, please use them the morning of your surgery and bring the inhalers with you to the hospital.    · Please take the following medications the morning of your surgery with a sip of water:    None    Surgery Instructions:  1. After midnight before surgery:  Do not eat or drink anything, including water, mints, gum, and hard candy. You may brush your teeth without swallowing. No smoking, chewing tobacco, or street drugs. 2. Please shower or bathe before surgery. 3. Please do not wear any cologne, lotion, powder, jewelry, piercings, perfume, makeup, nail polish, hair accessories, or hair spray on the day of surgery. Wear loose comfortable clothing. 4. Leave your valuables at home. Bring a storage case for any glasses/contacts. 5. An adult who is responsible for you MUST drive you home and should be with you for the first 24 hours after surgery. The Day of Surgery:  · Arrive at [address] [place] [address] Surgery Entrance at the time directed by your surgeon and check in at the desk. · If you have a living will or healthcare power of , please bring a copy. · You will be taken to the pre-op holding area where you will be prepared for surgery. A physical assessment will be performed by a nurse practitioner or house officer. Your IV will be started and you will meet your anesthesiologist.    · When you go to surgery, your family will be directed to the surgical waiting room, where the doctor should speak with them after your surgery.     · After surgery, you will be taken to the recovery room then when you are awake and stable you will go to the short stay unit for preparation to be discharged. Only your one designated person is allowed to come to short stay for your discharge.

## 2022-01-20 NOTE — PROGRESS NOTES
GI OFFICE FOLLOW UP    INTERVAL HISTORY:   No referring provider defined for this encounter. Chief Complaint   Patient presents with    Follow-up     Patient is here today for a 3 month f/u on EGD work up       1. Liver disease, chronic    2. Melena    3. Malignant neoplasm of lower third of esophagus (HCC)              HISTORY OF PRESENT ILLNESS: Mr.Mark ARACELIS Aden is a 57 y.o. male with a past history remarkable for ,   Patient seen with a history of melanotic stools. In the last week patient is noticing melena. Also stated that he had burgundy colored stools. No chest pain, shortness of breath, palpitations. No nausea or vomiting. Recently, just before Christmas he was admitted with significant anemia. In the past he was found to have stage I esophageal cancer. Was evaluated locally and also at [facility] and felt that he is not a surgical candidate. Subsequently he is being followed by oncology. Previous records reviewed. Past Medical,Family, and Social History reviewed and does contribute to the patient presenting condition. Patient's PMH/PSH,SH,PSYCH Hx, MEDs, ALLERGIES, and ROS were all reviewed and updated in the appropriate sections.  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 Tona Valdez, orthopedic, every 3-4 months, gets steroid injection    Lezama esophagus     BPH (benign prostatic hypertrophy)     Cholelithiasis     Cirrhosis ([address]     COVID-19 12/2020    pt reports he had a positive test while at [facility] in 2020, was asymptomatic    COVID-19 vaccine series completed 5/20/2021, 6/22/2021    Moderna 5/20/2021, 6/22/2021    DDD (degenerative disc disease), lumbar     Depression     Esophageal cancer ([address]     INVASIVE ADENOCARCINOMA ARISING IN TUBULAR ADENOMA WITH HIGH GRADE DYSPLASIA, ASSOCIATED WITH FOCAL INTESTINAL METAPLASIA     Esophageal varices (HCC)     Fatty liver     GERD (gastroesophageal reflux disease)     Hep C w/o coma, chronic (HCC)     History of alcohol abuse     6-12 beers a day; quit drinking July 2016    History of blood transfusion     History of colon polyps 2016    History of tobacco abuse     [place] (hard of hearing)     Hyperlipidemia     Hypertension     Port-A-Cath in place     right upper chest    Stomach ulcer     hx of    Tubular adenoma of colon 2016, 2018    Vitamin D deficiency     Wears glasses        Past Surgical History:   Procedure Laterality Date    BUNIONECTOMY      twice on right side    BUNIONECTOMY Left     CARPAL TUNNEL RELEASE Right     COLONOSCOPY      at age 35    COLONOSCOPY  10/05/2016    polyps-pathology tubular adenoma, and abnormal looking mucosa right colon-pathology-tubular adenoma    COLONOSCOPY N/A 3/30/2018    COLONOSCOPY POLYPECTOMY COLD BIOPSY performed by Barrett Morfin MD at [address] [place] [address]  03/30/2018    Small polyp in the sigmoid colon and excised with biopsy forceps--tubular adenoma    ENDOSCOPY, COLON, DIAGNOSTIC      EGD    IR PORT PLACEMENT EQUAL OR GREATER THAN 5 YEARS  4/19/2021    IR PORT PLACEMENT EQUAL OR GREATER THAN 5 YEARS 4/19/2021 [facility] SPECIAL PROCEDURES    KNEE SURGERY Left     cyst removed    NASAL SEPTUM SURGERY      NERVE BLOCK Right 11/23/2020    NERVE BLOCK RIGHT CERVICAL STEROID INJECTION  C3-C6 performed by Kylah Cortez MD at [address] [place] [address]  01/04/16    steroid injection C7 T1    OTHER SURGICAL HISTORY  11/21/2016    Bilateral Lumbar CACHORRO L4-L5 injections    OTHER SURGICAL HISTORY  12/19/2016    lumbar steroid injection    OTHER SURGICAL HISTORY  09/28/2018    BILATERAL L5 CACHORRO (N/A Back)    OTHER SURGICAL HISTORY Right 11/23/2020    cervical injection    PAIN MANAGEMENT PROCEDURE Left 7/9/2020    EPIDURAL STEROID INJECTION LEFT L4 L5 performed by Reed Grijalva MD at [facility] Left 7/20/2020    LEFT L4 L5 EPIDURAL STEROID INJECTION performed by Reed Grijalva MD at [address] [place] [address] Bilateral 8/17/2020    LUMBAR FACET BILATERAL L2-L5 performed by Reed Grijalva MD at [facility] Bilateral 12/7/2020    NERVE BLOCK BILATERAL LUMBAR MEDIAL BRANCH L2-L5 performed by Reed Grijalva MD at [address] AA&/STRD TFRML EPI LUMBAR/SACRAL 1 LEVEL Bilateral 9/6/2018    BILATERAL L5 CACHORRO performed by Reed Grijalva MD at [address] AA&/STRD TFRML EPI LUMBAR/SACRAL 1 LEVEL N/A 9/28/2018    BILATERAL L5 CACHORRO performed by Reed Grijalva MD at [address] N/CARPAL TUNNEL SURG Right 8/29/2017    CARPAL TUNNEL RELEASE RIGHT performed by Serenity Pak MD at [address] N/CARPAL TUNNEL SURG Left 10/31/2017    CARPAL TUNNEL RELEASE performed by Serenity Pak MD at [facility] 12/29/2020    EGD BIOPSY performed by Sheela Clark MD at [facility] 2/2/2021    EGD BIOPSY and spot marking performed by Deirdre Soto MD at [facility] 2/12/2021    ENDOSCOPIC ULTRASOUND, EGD performed by Hilary Alvarado MD at [address] [place] [address]  2/12/2021    EGD DIAGNOSTIC ONLY performed by Hilary Alvarado MD at [address] [place] [address] N/A 8/31/2021    EGD BIOPSY performed by Deirdre Soto MD at [facility]:    Current Outpatient Medications:     ferrous sulfate (IRON 325) 325 (65 Fe) MG tablet, Take 1 tablet by mouth 2 times daily, Disp: 60 tablet, Rfl: 5    vitamin D3 (CHOLECALCIFEROL) 25 MCG (1000 UT) TABS tablet, Take 1 tablet by mouth daily, Disp: 30 tablet, Rfl: 0   omeprazole (PRILOSEC) 40 MG delayed release capsule, take 1 capsule by mouth EVERY MORNING BEFORE BREAKFAST, Disp: 30 capsule, Rfl: 2    FLUoxetine (PROZAC) 20 MG capsule, take 1 capsule by mouth once daily, Disp: 30 capsule, Rfl: 3    lidocaine-prilocaine (EMLA) 2.5-2.5 % cream, Apply topically 45-60 mins prior to needle poke daily PRN, Disp: 1 Tube, Rfl: 2    hydrOXYzine (VISTARIL) 25 MG capsule, take 1 capsule by mouth three times a day if needed for anxiety, Disp: 90 capsule, Rfl: 2    atorvastatin (LIPITOR) 20 MG tablet, take 1 tablet by mouth at bedtime, Disp: 90 tablet, Rfl: 1    spironolactone (ALDACTONE) 50 MG tablet, take 1 tablet by mouth once daily, Disp: 90 tablet, Rfl: 3    ALLERGIES:   Allergies   Allergen Reactions    Bee Pollen Other (See Comments)     Runny nose, watery eyes    Pollen Extract      Runny nose, watery eyes       FAMILY HISTORY:       Problem Relation Age of Onset    Cancer Mother         pancreatic    Cancer Father         bone    Diabetes Sister     Asthma Brother          SOCIAL HISTORY:   Social History     Socioeconomic History    Marital status: Single     Spouse name: Not on file    Number of children: Not on file    Years of education: Not on file    Highest education level: Not on file   Occupational History    Not on file   Tobacco Use    Smoking status: Former Smoker     Packs/day: 1.00     Years: 45.00     Pack years: 45.00     Quit date: 2017     Years since quittin.0    Smokeless tobacco: Never Used   Vaping Use    Vaping Use: Never used   Substance and Sexual Activity    Alcohol use: Not Currently     Comment: quit     Drug use: Not Currently     Frequency: 1.0 times per week     Comment: cocaine,  stopped spring 2016    Sexual activity: Yes     Partners: Female   Other Topics Concern    Not on file   Social History Narrative     in the past, retired     Social Determinants of Health     Financial Resource Strain: Low Risk  Difficulty of Paying Living Expenses: Not hard at all   Food Insecurity: No Food Insecurity    Worried About Running Out of Food in the Last Year: Never true    Ran Out of Food in the Last Year: Never true   Transportation Needs: No Transportation Needs    Lack of Transportation (Medical): No    Lack of Transportation (Non-Medical): No   Physical Activity:     Days of Exercise per Week: Not on file    Minutes of Exercise per Session: Not on file   Stress:     Feeling of Stress : Not on file   Social Connections:     Frequency of Communication with Friends and Family: Not on file    Frequency of Social Gatherings with Friends and Family: Not on file    Attends [employer] Services: Not on file    Active Member of [address] [place] [address] or Organizations: Not on file    Attends Club or Organization Meetings: Not on file    Marital Status: Not on file   Intimate Partner Violence:     Fear of Current or Ex-Partner: Not on file    Emotionally Abused: Not on file    Physically Abused: Not on file    Sexually Abused: Not on file   Housing Stability:     Unable to Pay for Housing in the Last Year: Not on file    Number of [place] in the Last Year: Not on file    Unstable Housing in the Last Year: Not on file         REVIEW OF SYSTEMS:         Review of Systems   Constitutional: Positive for fatigue. Negative for appetite change and unexpected weight change. HENT: Positive for hearing loss (rt ear). Negative for sore throat, trouble swallowing and voice change. Eyes: Positive for visual disturbance (wears glasses). Respiratory: Negative. Negative for cough, choking and shortness of breath. Cardiovascular: Negative. Negative for chest pain and leg swelling. Gastrointestinal: Positive for abdominal distention, abdominal pain, anal bleeding, blood in stool, diarrhea and nausea. Negative for constipation, rectal pain and vomiting. Denies   Endocrine: Negative.   Negative for polydipsia, polyphagia and polyuria. Genitourinary: Negative for difficulty urinating, frequency, hematuria and urgency. Musculoskeletal: Negative for back pain, joint swelling and myalgias. Skin: Negative. Allergic/Immunologic: Negative. Negative for environmental allergies, food allergies and immunocompromised state. Neurological: Negative for dizziness, tremors, weakness, light-headedness, numbness and headaches. Hematological: Bruises/bleeds easily. Psychiatric/Behavioral: Positive for sleep disturbance. The patient is nervous/anxious. PHYSICAL EXAMINATION:     Vital signs reviewed per the nursing documentation. BP (!) 144/92   Pulse 110   Temp 97.2 °F (36.2 °C)   Ht 5' 10\" (1.778 m)   Wt 217 lb 3.2 oz (98.5 kg)   SpO2 99%   BMI 31.16 kg/m²   Body mass index is 31.16 kg/m². Physical Exam  Vitals reviewed. Constitutional:       Appearance: Normal appearance. HENT:      Head: Normocephalic and atraumatic. Eyes:      Extraocular Movements: Extraocular movements intact. Conjunctiva/sclera: Conjunctivae normal.   Cardiovascular:      Rate and Rhythm: Normal rate and regular rhythm. Heart sounds: Normal heart sounds. Pulmonary:      Effort: Pulmonary effort is normal.      Breath sounds: Normal breath sounds. Abdominal:      General: Bowel sounds are normal. There is no distension. Palpations: Abdomen is soft. There is no mass. Tenderness: There is no abdominal tenderness. There is no guarding. Hernia: No hernia is present. Genitourinary:     Rectum: Guaiac result positive. Comments: Stool is strongly positive for blood. Skin:     General: Skin is warm and dry. Neurological:      General: No focal deficit present. Mental Status: He is alert and oriented to person, place, and time.    Psychiatric:         Mood and Affect: Mood normal.         Behavior: Behavior normal.           LABORATORY DATA: Reviewed  Lab Results   Component Value Date    WBC 6.8 01/20/2022    HGB 9.4 (L) 01/20/2022    HCT 30.0 (L) 01/20/2022    MCV 85.8 01/20/2022     (L) 01/20/2022     01/20/2022    K 3.3 (L) 01/20/2022     01/20/2022    CO2 23 01/20/2022    BUN 11 01/20/2022    CREATININE 0.49 (L) 01/20/2022    LABPROT 7.7 04/19/2012    LABALBU 3.2 (L) 01/20/2022    BILITOT 2.38 (H) 01/20/2022    ALKPHOS 130 (H) 01/20/2022    AST 37 01/20/2022    ALT 11 01/20/2022    INR 1.2 01/20/2022         Lab Results   Component Value Date    RBC 3.49 (L) 01/20/2022    HGB 9.4 (L) 01/20/2022    MCV 85.8 01/20/2022    MCH 27.0 01/20/2022    MCHC 31.5 01/20/2022    RDW 24.0 (H) 01/20/2022    MPV 7.2 01/20/2022    BASOPCT 0 01/20/2022    LYMPHSABS 0.41 (L) 01/20/2022    MONOSABS 0.68 01/20/2022    NEUTROABS 5.71 01/20/2022    EOSABS 0.00 01/20/2022    BASOSABS 0.00 01/20/2022         DIAGNOSTIC TESTING:     IR FLUORO GUIDED CVA DEVICE PLMT/REPLACE/REMOVAL    Result Date: 12/22/2021  PROCEDURE: ULTRASOUND GUIDED VASCULAR ACCESS. FLUOROSCOPY GUIDED PICC PLACEMENT 12/22/2021. HISTORY: ORDERING SYSTEM PROVIDED HISTORY: IV access multiple IV meds TECHNOLOGIST PROVIDED HISTORY: IV access multiple IV meds Lumen?->Double Lumen SEDATION: None FLUOROSCOPY DOSE AND TYPE OR TIME AND EXPOSURES: 9 seconds; D AP 42 cGy cm2 TECHNIQUE: Informed consent was obtained after a detailed explanation of the procedure including risks, benefits, and alternatives. Universal protocol was observed. The right arm was prepped and draped in sterile fashion using maximum sterile barrier technique. Local anesthesia was achieved with lidocaine. A micropuncture needle was used to access the right basilic vein using ultrasound guidance. An ultrasound image demonstrating patency of the vein with needle tip located within it. An image was obtained and stored in PACs.  A 0.018 guidewire was used to place a peel-a-way sheath and a 5 Afghan dual-lumen PICC was advanced with fluoroscopic guidance with the tip at the cavo-atrial junction. The catheter flushed easily and there was a good blood return. The catheter was secured to the skin. The patient tolerated the procedure well and there were no immediate complications. EBL: Less than 3 mL FINDINGS: Fluoroscopic image demonstrates the tip of the catheter at the cavo-atrial junction. Successful ultrasound and fluoroscopy guided PICC placement          Assessment  1. Liver disease, chronic    2. Melena    3. Malignant neoplasm of lower third of esophagus ([address]        Plan  Patient's stool is positive for blood. Patient had significant anemia. Also known to have chronic liver disease with varices. He needs EGD as early as possible. Also advised to have labs. After discussion both patient and her family understood and agreed. EGD arranged. Thank you for allowing me to participate in the care of Mr. Johanna Dasilva. For any further questions please do not hesitate to contact me. I have reviewed and agree with the ROS entered by the MA/LPN. Note is dictated utilizing voice recognition software. Unfortunately this leads to occasional typographical errors.  Please contact our office if you have any questions        Harshad Dumont MD,FACP, [facility]  Board Certified in Gastroenterology and [address] [place] [address] Gastroenterology  Office #: [phone]

## 2022-01-21 ENCOUNTER — ANESTHESIA EVENT (OUTPATIENT)
Dept: ENDOSCOPY | Age: 62
End: 2022-01-21
Payer: MEDICARE

## 2022-01-21 ENCOUNTER — ANESTHESIA (OUTPATIENT)
Dept: ENDOSCOPY | Age: 62
End: 2022-01-21
Payer: MEDICARE

## 2022-01-21 ENCOUNTER — HOSPITAL ENCOUNTER (OUTPATIENT)
Age: 62
Setting detail: OUTPATIENT SURGERY
Discharge: HOME | End: 2022-01-21
Attending: INTERNAL MEDICINE | Admitting: INTERNAL MEDICINE
Payer: MEDICARE

## 2022-01-21 VITALS
DIASTOLIC BLOOD PRESSURE: 85 MMHG | BODY MASS INDEX: 31.07 KG/M2 | OXYGEN SATURATION: 99 % | WEIGHT: 217 LBS | SYSTOLIC BLOOD PRESSURE: 122 MMHG | TEMPERATURE: 97.4 F | RESPIRATION RATE: 21 BRPM | HEIGHT: 70 IN | HEART RATE: 119 BPM

## 2022-01-21 VITALS
OXYGEN SATURATION: 100 % | SYSTOLIC BLOOD PRESSURE: 128 MMHG | RESPIRATION RATE: 16 BRPM | DIASTOLIC BLOOD PRESSURE: 79 MMHG

## 2022-01-21 PROCEDURE — 43239 EGD BIOPSY SINGLE/MULTIPLE: CPT | Performed by: INTERNAL MEDICINE

## 2022-01-21 PROCEDURE — 6360000002 HC RX W HCPCS: Performed by: NURSE ANESTHETIST, CERTIFIED REGISTERED

## 2022-01-21 PROCEDURE — 7100000011 HC PHASE II RECOVERY - ADDTL 15 MIN: Performed by: INTERNAL MEDICINE

## 2022-01-21 PROCEDURE — 2709999900 HC NON-CHARGEABLE SUPPLY: Performed by: INTERNAL MEDICINE

## 2022-01-21 PROCEDURE — 2580000003 HC RX 258: Performed by: ANESTHESIOLOGY

## 2022-01-21 PROCEDURE — 7100000010 HC PHASE II RECOVERY - FIRST 15 MIN: Performed by: INTERNAL MEDICINE

## 2022-01-21 PROCEDURE — 3700000001 HC ADD 15 MINUTES (ANESTHESIA): Performed by: INTERNAL MEDICINE

## 2022-01-21 PROCEDURE — 88305 TISSUE EXAM BY PATHOLOGIST: CPT

## 2022-01-21 PROCEDURE — 6360000002 HC RX W HCPCS: Performed by: ANESTHESIOLOGY

## 2022-01-21 PROCEDURE — 2500000003 HC RX 250 WO HCPCS: Performed by: NURSE ANESTHETIST, CERTIFIED REGISTERED

## 2022-01-21 PROCEDURE — 3700000000 HC ANESTHESIA ATTENDED CARE: Performed by: INTERNAL MEDICINE

## 2022-01-21 PROCEDURE — 3609012400 HC EGD TRANSORAL BIOPSY SINGLE/MULTIPLE: Performed by: INTERNAL MEDICINE

## 2022-01-21 RX ORDER — DIPHENHYDRAMINE HYDROCHLORIDE 50 MG/ML
12.5 INJECTION INTRAMUSCULAR; INTRAVENOUS
Status: DISCONTINUED | OUTPATIENT
Start: 2022-01-21 | End: 2022-01-21 | Stop reason: HOSPADM

## 2022-01-21 RX ORDER — PROPOFOL 10 MG/ML
INJECTION, EMULSION INTRAVENOUS PRN
Status: DISCONTINUED | OUTPATIENT
Start: 2022-01-21 | End: 2022-01-21 | Stop reason: SDUPTHER

## 2022-01-21 RX ORDER — FENTANYL CITRATE 50 UG/ML
50 INJECTION, SOLUTION INTRAMUSCULAR; INTRAVENOUS EVERY 5 MIN PRN
Status: DISCONTINUED | OUTPATIENT
Start: 2022-01-21 | End: 2022-01-21 | Stop reason: HOSPADM

## 2022-01-21 RX ORDER — ONDANSETRON 2 MG/ML
4 INJECTION INTRAMUSCULAR; INTRAVENOUS
Status: COMPLETED | OUTPATIENT
Start: 2022-01-21 | End: 2022-01-21

## 2022-01-21 RX ORDER — HYDRALAZINE HYDROCHLORIDE 20 MG/ML
5 INJECTION INTRAMUSCULAR; INTRAVENOUS EVERY 10 MIN PRN
Status: DISCONTINUED | OUTPATIENT
Start: 2022-01-21 | End: 2022-01-21 | Stop reason: HOSPADM

## 2022-01-21 RX ORDER — HYDROCODONE BITARTRATE AND ACETAMINOPHEN 5; 325 MG/1; MG/1
2 TABLET ORAL PRN
Status: DISCONTINUED | OUTPATIENT
Start: 2022-01-21 | End: 2022-01-21 | Stop reason: HOSPADM

## 2022-01-21 RX ORDER — MEPERIDINE HYDROCHLORIDE 25 MG/ML
12.5 INJECTION INTRAMUSCULAR; INTRAVENOUS; SUBCUTANEOUS EVERY 5 MIN PRN
Status: DISCONTINUED | OUTPATIENT
Start: 2022-01-21 | End: 2022-01-21 | Stop reason: HOSPADM

## 2022-01-21 RX ORDER — LABETALOL HYDROCHLORIDE 5 MG/ML
5 INJECTION, SOLUTION INTRAVENOUS EVERY 10 MIN PRN
Status: DISCONTINUED | OUTPATIENT
Start: 2022-01-21 | End: 2022-01-21 | Stop reason: HOSPADM

## 2022-01-21 RX ORDER — SODIUM CHLORIDE, SODIUM LACTATE, POTASSIUM CHLORIDE, CALCIUM CHLORIDE 600; 310; 30; 20 MG/100ML; MG/100ML; MG/100ML; MG/100ML
INJECTION, SOLUTION INTRAVENOUS CONTINUOUS
Status: DISCONTINUED | OUTPATIENT
Start: 2022-01-21 | End: 2022-01-21 | Stop reason: HOSPADM

## 2022-01-21 RX ORDER — LIDOCAINE HYDROCHLORIDE 20 MG/ML
INJECTION, SOLUTION INFILTRATION; PERINEURAL PRN
Status: DISCONTINUED | OUTPATIENT
Start: 2022-01-21 | End: 2022-01-21 | Stop reason: SDUPTHER

## 2022-01-21 RX ORDER — HYDROCODONE BITARTRATE AND ACETAMINOPHEN 5; 325 MG/1; MG/1
1 TABLET ORAL PRN
Status: DISCONTINUED | OUTPATIENT
Start: 2022-01-21 | End: 2022-01-21 | Stop reason: HOSPADM

## 2022-01-21 RX ORDER — MORPHINE SULFATE 2 MG/ML
2 INJECTION, SOLUTION INTRAMUSCULAR; INTRAVENOUS EVERY 5 MIN PRN
Status: DISCONTINUED | OUTPATIENT
Start: 2022-01-21 | End: 2022-01-21 | Stop reason: HOSPADM

## 2022-01-21 RX ORDER — FENTANYL CITRATE 50 UG/ML
25 INJECTION, SOLUTION INTRAMUSCULAR; INTRAVENOUS EVERY 5 MIN PRN
Status: DISCONTINUED | OUTPATIENT
Start: 2022-01-21 | End: 2022-01-21 | Stop reason: HOSPADM

## 2022-01-21 RX ORDER — METOCLOPRAMIDE HYDROCHLORIDE 5 MG/ML
10 INJECTION INTRAMUSCULAR; INTRAVENOUS
Status: COMPLETED | OUTPATIENT
Start: 2022-01-21 | End: 2022-01-21

## 2022-01-21 RX ADMIN — SODIUM CHLORIDE, POTASSIUM CHLORIDE, SODIUM LACTATE AND CALCIUM CHLORIDE: 600; 310; 30; 20 INJECTION, SOLUTION INTRAVENOUS at 09:37

## 2022-01-21 RX ADMIN — METOCLOPRAMIDE 10 MG: 5 INJECTION, SOLUTION INTRAMUSCULAR; INTRAVENOUS at 12:34

## 2022-01-21 RX ADMIN — PROPOFOL 250 MG: 10 INJECTION, EMULSION INTRAVENOUS at 11:37

## 2022-01-21 RX ADMIN — ONDANSETRON 4 MG: 2 INJECTION INTRAMUSCULAR; INTRAVENOUS at 12:20

## 2022-01-21 RX ADMIN — LIDOCAINE HYDROCHLORIDE 100 MG: 20 INJECTION, SOLUTION INFILTRATION; PERINEURAL at 11:37

## 2022-01-21 ASSESSMENT — PAIN SCALES - GENERAL: PAINLEVEL_OUTOF10: 0

## 2022-01-21 ASSESSMENT — PULMONARY FUNCTION TESTS
PIF_VALUE: 0
PIF_VALUE: 1
PIF_VALUE: 0
PIF_VALUE: 1
PIF_VALUE: 0

## 2022-01-21 ASSESSMENT — ENCOUNTER SYMPTOMS
RHINORRHEA: 0
TROUBLE SWALLOWING: 0
SHORTNESS OF BREATH: 0
SINUS PAIN: 0
WHEEZING: 0
CHEST TIGHTNESS: 0
APNEA: 0
SINUS PRESSURE: 0
BACK PAIN: 0
COUGH: 0
SORE THROAT: 0

## 2022-01-21 ASSESSMENT — PAIN - FUNCTIONAL ASSESSMENT: PAIN_FUNCTIONAL_ASSESSMENT: 0-10

## 2022-01-21 NOTE — OP NOTE
ESOPHAGOGASTRODUODENOSCOPY   ( EGD )  DATE OF PROCEDURE: 1/21/2022     SURGEON: Tenzin Gamez MD    ASSISTANT: None    PREOPERATIVE DIAGNOSIS: Patient has melanotic stools. Known to have liver disease. Also had history of probable stage I esophageal cancer. Procedure performed evaluate upper GI lesions    POSTOPERATIVE DIAGNOSIS: Severe portal hypertensive gastropathy. At the previous biopsy site at the GE junction there appears to be questionable plaque polyp 2 to 3 mm seen biopsy taken. No ulcer disease noted. OPERATION: Upper GI endoscopy with Biopsy    ANESTHESIA: MAC    ESTIMATED BLOOD LOSS: None    COMPLICATIONS: None. SPECIMENS:  Was Obtained: From GE junction to evaluate malignancy. This patient has history of malignancy in the same area in the past.    HISTORY: The patient is a 57y.o. year old male with history of above preop diagnosis. I recommended esophagogastroduodenoscopy with possible biopsy and I explained the risk, benefits, expected outcome, and alternatives to the procedure. Risks included but are not limited to bleeding, infection, respiratory distress, hypotension, and perforation of the esophagus, stomach, or duodenum. Patient understands and is in agreement. PROCEDURE: The patient was given IV conscious sedation. The patient's SPO2 remained above 90% throughout the procedure. Cetacaine spray given. Patient placed in left lateral position. Olympus  videogastroscope was inserted orally under vision into the esophagus without difficulty and advanced into the stomach then through the pylorus up to the second part of duodenum. Findings:    Retropharyngeal area was grossly normal appearing    Esophagus: abnormal: Appears to have Lezama's mucosa. At the GE junction, at the previous biopsy site, there is a flat polyp 3 to 4 mm seen biopsied. Patient had severe portal hypertensive gastropathy starting from the GE junction.     Stomach:    Fundus and Cardia Examined in Retroflexed View: abnormal: Has severe portal hypertensive gastropathy. Initially there was some oozing of blood from this area. Body: abnormal: Signs of portal hypertensive gastropathy    Antrum: normal    Duodenum:     Descending: normal    Bulb: normal    While withdrawing the scope the above findings were verified and the scope was removed. The patient has tolerated the procedure without unusual events. Recommendations/Plan:   1. F/U Biopsies  2. F/U In Office as instructed  3. Discussed with the family  4. To start on beta-blocker therapy. 5. To repeat CBC in the next 2 days. 6. To see in the office next week.                    Electronically signed by Ryne Stafford MD  on 1/21/2022 at 11:53 AM

## 2022-01-21 NOTE — H&P
HISTORY and [address] ALAY[address]       NAME:  Carolee Paz  MRN: [id number]   YOB: 1960   Date: 1/21/2022   Age: 57 y.o. Gender: male       COMPLAINT AND PRESENT HISTORY:   Carolee Paz  is a 57 y.o. male presenting today for an EGD d/t melena. Pt was admitted to the hospital in December of 2021 with anemia, given 3UPRBC as his HGB was 4.2. Pt does have hx of alcoholic cirrhosis and esophageal cancer which was treated with chemoradiation. Pt was d/c from the hospital with gi follow up and ppi. Currently he c/o \"a little stomach ache\" he denies any n/v/d/c. He does report black stools. Pt states he does have a hx of esophageal varices. He is somewhat of a poor historian and extremely hard of hearing, I feel he may just be answering yes or no to my questions without fully understanding however, I have attempted to repeat myself and slow down my questioning. STOP-BANG Sleep Apnea Questionnaire     SNORE loudly (heard through closed doors)?                                   no  TIRED, fatigued, sleepy during daytime?                                           no  OBSERVED stopping breathing during sleep?                                  no  High blood PRESSURE or being treated? yes     BMI over 35?                                                                                       no  AGE over 50?                                                                                    yes  NECK circumference over 16\"? no  GENDER (male)? yes                                                                                                                 Total 3  High risk 5-8  Intermediate risk 3-4  Low risk 0-2      NPO since midnight. meds with sip of water  Pt does not wear dentures.    Pt denies any hx of MRSA infection  Pt not currently taking any blood thinners or anticoagulants  Pt denies any personal or FHx of complications with anesthesia. Pt denies any acute symptoms of illness at this time including no SOB, CP, fever, URI or UTI symptoms.       RECENT IMAGING    IR FLUORO GUIDED CVA DEVICE PLMT/REPLACE/REMOVAL    Result Date: 12/22/2021  Successful ultrasound and fluoroscopy guided PICC placement        PAST MEDICAL HISTORY     Past Medical History:   Diagnosis Date    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 Merlyn Daniel, orthopedic, every 3-4 months, gets steroid injection    Lezama esophagus     BPH (benign prostatic hypertrophy)     Cholelithiasis     Cirrhosis ([address]     COVID-19 12/2020    pt reports he had a positive test while at [facility] in 2020, was asymptomatic    COVID-19 vaccine series completed 5/20/2021, 6/22/2021    Moderna 5/20/2021, 6/22/2021    DDD (degenerative disc disease), lumbar     Depression     Esophageal cancer ([address]     INVASIVE ADENOCARCINOMA ARISING IN TUBULAR ADENOMA WITH HIGH GRADE DYSPLASIA, ASSOCIATED WITH FOCAL INTESTINAL METAPLASIA     Esophageal varices ([address]     Fatty liver     GERD (gastroesophageal reflux disease)     Hep C w/o coma, chronic ([address]     History of alcohol abuse     6-12 beers a day; quit drinking July 2016    History of blood transfusion     History of colon polyps 2016    History of tobacco abuse     [place] (hard of hearing)     Hyperlipidemia     Hypertension     Port-A-Cath in place     right upper chest    Stomach ulcer     hx of    Tubular adenoma of colon 2016, 2018    Vitamin D deficiency     Wears glasses        SURGICAL HISTORY       Past Surgical History:   Procedure Laterality Date    BUNIONECTOMY      twice on right side    BUNIONECTOMY Left     CARPAL TUNNEL RELEASE Right     COLONOSCOPY      at age 35    COLONOSCOPY  10/05/2016    polyps-pathology tubular adenoma, and abnormal looking mucosa right colon-pathology-tubular adenoma    COLONOSCOPY N/A 3/30/2018    COLONOSCOPY POLYPECTOMY COLD BIOPSY performed by Maribel Lyn MD at [facility]  03/30/2018    Small polyp in the sigmoid colon and excised with biopsy forceps--tubular adenoma    ENDOSCOPY, COLON, DIAGNOSTIC      EGD    IR PORT PLACEMENT EQUAL OR GREATER THAN 5 YEARS  4/19/2021    IR PORT PLACEMENT EQUAL OR GREATER THAN 5 YEARS 4/19/2021 [facility] SPECIAL PROCEDURES    KNEE SURGERY Left     cyst removed    NASAL SEPTUM SURGERY      NERVE BLOCK Right 11/23/2020    NERVE BLOCK RIGHT CERVICAL STEROID INJECTION  C3-C6 performed by Shi Walls MD at [address]  01/04/16    steroid injection C7 T1    OTHER SURGICAL HISTORY  11/21/2016    Bilateral Lumbar CACHORRO L4-L5 injections    OTHER SURGICAL HISTORY  12/19/2016    lumbar steroid injection    OTHER SURGICAL HISTORY  09/28/2018    BILATERAL L5 CACHORRO (N/A Back)    OTHER SURGICAL HISTORY Right 11/23/2020    cervical injection    PAIN MANAGEMENT PROCEDURE Left 7/9/2020    EPIDURAL STEROID INJECTION LEFT L4 L5 performed by Shi Walls MD at [facility] Left 7/20/2020    LEFT L4 L5 EPIDURAL STEROID INJECTION performed by Shi Walls MD at [facility] Bilateral 8/17/2020    LUMBAR FACET BILATERAL L2-L5 performed by Shi Walls MD at [facility] Bilateral 12/7/2020    NERVE BLOCK BILATERAL LUMBAR MEDIAL BRANCH L2-L5 performed by Shi Walls MD at [address] AA&/STRD TFRML EPI LUMBAR/SACRAL 1 LEVEL Bilateral 9/6/2018    BILATERAL L5 CACHORRO performed by Shi Walls MD at [address] AA&/STRD TFRML EPI LUMBAR/SACRAL 1 LEVEL N/A 9/28/2018    BILATERAL L5 CACHORRO performed by Shi Walls MD at [facility] N/CARPAL [address] Right 8/29/2017    CARPAL TUNNEL RELEASE RIGHT performed by Patrice Ellis MD at [facility] [place] [facility]VISE MEDIAN N/CARPAL TUNNEL SURG Left 10/31/2017    CARPAL TUNNEL RELEASE performed by Ana Laura Mari MD at [address] [place] [address] N/A 2020    EGD BIOPSY performed by Anila Roca MD at [address] [place] [address] N/A 2021    EGD BIOPSY and spot marking performed by Barrett Morfin MD at [address] [place] [address] N/A 2021    ENDOSCOPIC ULTRASOUND, EGD performed by Zulma Alcocer MD at [address] [facility] ENDOSCOPY  2021    EGD DIAGNOSTIC ONLY performed by Zulma Alcocer MD at [address] [place] [address] N/A 2021    EGD BIOPSY performed by Barrett Morfin MD at [address] [place] [address]       Family History   Problem Relation Age of Onset    Cancer Mother         pancreatic    Cancer Father         bone    Diabetes Sister     Asthma Brother        SOCIAL HISTORY       Social History     Socioeconomic History    Marital status: Single     Spouse name: Not on file    Number of children: Not on file    Years of education: Not on file    Highest education level: Not on file   Occupational History    Not on file   Tobacco Use    Smoking status: Former Smoker     Packs/day: 1.00     Years: 45.00     Pack years: 45.00     Quit date: 2017     Years since quittin.0    Smokeless tobacco: Never Used   Vaping Use    Vaping Use: Never used   Substance and Sexual Activity    Alcohol use: Not Currently     Comment: quit     Drug use: Not Currently     Frequency: 1.0 times per week     Comment: cocaine,  stopped spring 2016    Sexual activity: Yes     Partners: Female   Other Topics Concern    Not on file   Social History Narrative     in the past, retired     Social Determinants of [address] Strain: [address] Difficulty of Paying Living Expenses: Not hard at [address]: No Food Insecurity    Worried About Running Out of Food in the Last Year: Never true    Marge of Food in the Last Year: Never true   Transportation Needs: No Transportation Needs    Lack of Transportation (Medical): No    Lack of Transportation (Non-Medical): No   Physical Activity:     Days of Exercise per Week: Not on file    Minutes of Exercise per Session: Not on file   Stress:     Feeling of Stress : Not on file   Social Connections:     Frequency of Communication with Friends and Family: Not on file    Frequency of Social Gatherings with Friends and Family: Not on file    Attends Confucianist Services: Not on file    Active Member of [address] [place] [address] or Organizations: Not on file    Attends Club or Organization Meetings: Not on file    Marital Status: Not on file   Intimate Partner Violence:     Fear of Current or Ex-Partner: Not on file    Emotionally Abused: Not on file    Physically Abused: Not on file    Sexually Abused: Not on file   Housing Stability:     Unable to Pay for Housing in the Last Year: Not on file    Number of [place] in the Last Year: Not on file    Unstable Housing in the Last Year: Not on file           REVIEW OF SYSTEMS      Allergies   Allergen Reactions    Bee Pollen Other (See Comments)     Runny nose, watery eyes    Pollen Extract      Runny nose, watery eyes       No current facility-administered medications on file prior to encounter. Current Outpatient Medications on File Prior to Encounter   Medication Sig Dispense Refill    ferrous sulfate (IRON 325) 325 (65 Fe) MG tablet Take 1 tablet by mouth 2 times daily 60 tablet 5    vitamin D3 (CHOLECALCIFEROL) 25 MCG (1000 UT) TABS tablet Take 1 tablet by mouth daily 30 tablet 0    oxyCODONE-acetaminophen (PERCOCET) 5-325 MG per tablet Take 1 tablet by mouth daily as needed for Pain for up to 30 days.  30 tablet 0    pantoprazole (PROTONIX) 40 MG tablet Take 1 tablet by mouth 2 times daily (before meals) 60 tablet 5  omeprazole (PRILOSEC) 40 MG delayed release capsule take 1 capsule by mouth EVERY MORNING BEFORE BREAKFAST 30 capsule 2    FLUoxetine (PROZAC) 20 MG capsule take 1 capsule by mouth once daily 30 capsule 3    lidocaine-prilocaine (EMLA) 2.5-2.5 % cream Apply topically 45-60 mins prior to needle poke daily PRN 1 Tube 2    hydrOXYzine (VISTARIL) 25 MG capsule take 1 capsule by mouth three times a day if needed for anxiety 90 capsule 2    atorvastatin (LIPITOR) 20 MG tablet take 1 tablet by mouth at bedtime 90 tablet 1    spironolactone (ALDACTONE) 50 MG tablet take 1 tablet by mouth once daily 90 tablet 3        Review of Systems   Constitutional: Negative for chills, diaphoresis, fatigue and fever. HENT: Positive for hearing loss. Negative for congestion, dental problem, ear pain, postnasal drip, rhinorrhea, sinus pressure, sinus pain, sore throat and trouble swallowing. Eyes: Positive for visual disturbance.        +glasses   Respiratory: Negative for apnea, cough, chest tightness, shortness of breath and wheezing. Cardiovascular: Negative for chest pain, palpitations and leg swelling. Genitourinary: Negative for dysuria, flank pain, frequency and hematuria. Musculoskeletal: Negative for back pain, joint swelling and myalgias. Skin: Negative for rash and wound. Neurological: Negative for dizziness, weakness, numbness and headaches. Hematological: Does not bruise/bleed easily. Psychiatric/Behavioral: Negative for agitation and confusion. The patient is not nervous/anxious. See HPI    GENERAL PHYSICAL EXAM:     Vitals: See nurse flowsheet for current vitals. Physical Exam  Constitutional:       General: He is not in acute distress. Appearance: Normal appearance. He is well-developed and normal weight. He is not ill-appearing or toxic-appearing. HENT:      Head: Normocephalic and atraumatic.       Ears:      Comments: [place]     Mouth/Throat:      Mouth: Mucous membranes are dry. Pharynx: Oropharynx is clear. No oropharyngeal exudate or posterior oropharyngeal erythema. Eyes:      Extraocular Movements: Extraocular movements intact. Conjunctiva/sclera: Conjunctivae normal.      Pupils: Pupils are equal, round, and reactive to light. Comments: +glasses   Cardiovascular:      Rate and Rhythm: Normal rate and regular rhythm. Pulses: Normal pulses. Heart sounds: Normal heart sounds. No murmur heard. No friction rub. No gallop. Pulmonary:      Effort: Pulmonary effort is normal.      Breath sounds: Normal breath sounds. No wheezing. Abdominal:      General: Bowel sounds are normal. There is no distension. Palpations: Abdomen is soft. Tenderness: There is no abdominal tenderness. There is no guarding or rebound. Comments: Hyperactive bs. Musculoskeletal:         General: No swelling. Normal range of motion. Cervical back: Normal range of motion and neck supple. No rigidity or tenderness. Right lower leg: No edema. Left lower leg: No edema. Skin:     General: Skin is warm and dry. Coloration: Skin is pale. Findings: No erythema. Neurological:      General: No focal deficit present. Mental Status: He is alert and oriented to person, place, and time. Mental status is at baseline. Sensory: No sensory deficit. Psychiatric:         Mood and Affect: Mood normal.         Behavior: Behavior normal.         Thought Content:  Thought content normal.         Judgment: Judgment normal.                                                                                         PROVISIONAL DIAGNOSES / SURGERY:      egd  melena    Patient Active Problem List    Diagnosis Date Noted    Esophageal adenocarcinoma [facility]     Low hemoglobin 12/20/2021    Symptomatic anemia, microcytic, acute 12/20/2021    Hypotension 12/20/2021    Former smoker, 50+ pack years, quit 2016 12/20/2021    HLD (hyperlipidemia) 12/20/2021    Abnormal findings on diagnostic imaging of spine 12/14/2021    Cervical spinal stenosis 12/14/2021    Spinal stenosis of lumbar region with neurogenic claudication 12/14/2021    Severe comorbid illness 11/30/2021    Gait instability 11/30/2021    Current smoker 04/05/2021    COVID-19 02/23/2021    Anxiety 02/23/2021    Malignant neoplasm of lower third of esophagus (HCC)     Hypocalcemia 12/26/2020    Hypophosphatemia 12/26/2020    Gastrointestinal hemorrhage with melena     Alcohol abuse     Altered mental status     Acute gastrointestinal bleeding 12/23/2020    Thrombocytopenia ([address] 12/23/2020    Hepatitis C virus infection resolved after antiviral drug therapy 12/23/2020    Cervical facet syndrome 11/23/2020    Lumbar facet arthropathy 08/17/2020    Elevated LFTs 08/12/2020    Seasonal allergies 08/12/2020    S/P epidural steroid injection 08/05/2020    Essential hypertension 04/24/2019    Recurrent major depressive disorder in partial remission ([address] 04/24/2019    Pure hypercholesterolemia 02/04/2019    Hypokalemia 02/04/2019    Vitamin D deficiency 09/20/2017    History of hepatitis C 09/11/2017    Ganglion cyst 05/31/2017    Carpal tunnel syndrome of right wrist 05/31/2017    Tinnitus 03/23/2017    Eustachian tube dysfunction 03/23/2017    Lumbar radiculitis 11/08/2016    Lumbar disc herniation 11/08/2016    Gynecomastia, male 10/26/2016    Depression 10/13/2016    Vertebrogenic low back pain 10/06/2016    DDD (degenerative disc disease), lumbar 10/06/2016    Hepatic cirrhosis ([address] 09/15/2016    Psychophysiologic insomnia 09/14/2016    Cirrhosis (HCC)     Hep C w/o coma, chronic (HCC)     Fatty liver     Calculus of gallbladder without cholecystitis 08/10/2016    Chronic viral hepatitis B without delta agent and without coma ([address] 07/22/2016    Hypomagnesemia     Alcohol withdrawal syndrome without complication (HCC) 42/60/6070    Hyponatremia 07/20/2016    Cervical radicular pain 01/04/2016    Tubular adenoma of colon 01/01/2016    History of colon polyps 01/01/2016    Back pain, chronic 04/19/2012    Hearing difficulty 04/19/2012    GERD (gastroesophageal reflux disease) 04/19/2012               MASSIMO Dimas - CNP on 1/21/2022 at 8:41 AM

## 2022-01-21 NOTE — ANESTHESIA PRE PROCEDURE
Allergen Reactions    Bee Pollen Other (See Comments)     Runny nose, watery eyes    Pollen Extract      Runny nose, watery eyes       Problem List:    Patient Active Problem List   Diagnosis Code    Back pain, chronic M54.9, G89.29    Hearing difficulty H91.90    GERD (gastroesophageal reflux disease) K21.9    Cervical radicular pain M54.12    Alcohol withdrawal syndrome without complication (HCC) K39.276    Hyponatremia E87.1    Hypomagnesemia E83.42    Chronic viral hepatitis B without delta agent and without coma (HCC) B18.1    Calculus of gallbladder without cholecystitis K80.20    Hep C w/o coma, chronic (HCC) B18.2    Fatty liver K76.0    Psychophysiologic insomnia F51.04    Cirrhosis (HCC) K74.60    Hepatic cirrhosis (HCC) K74.60    Vertebrogenic low back pain M54.51    DDD (degenerative disc disease), lumbar M51.36    Depression F32. A    Tubular adenoma of colon D12.6    History of colon polyps Z86.010    Gynecomastia, male N58    Lumbar radiculitis M54.16    Lumbar disc herniation M51.26    Tinnitus H93.19    Eustachian tube dysfunction H69.80    Ganglion cyst M67.40    Carpal tunnel syndrome of right wrist G56.01    History of hepatitis C Z86.19    Vitamin D deficiency E55.9    Pure hypercholesterolemia E78.00    Hypokalemia E87.6    Essential hypertension I10    Recurrent major depressive disorder in partial remission (HCC) F33.41    S/P epidural steroid injection Z92.241    Elevated LFTs R79.89    Seasonal allergies J30.2    Lumbar facet arthropathy M47.816    Cervical facet syndrome M47.812    Acute gastrointestinal bleeding K92.2    Thrombocytopenia (HCC) D69.6    Hepatitis C virus infection resolved after antiviral drug therapy Z86.19    Gastrointestinal hemorrhage with melena K92.1    Alcohol abuse F10.10    Altered mental status R41.82    Hypocalcemia E83.51    Hypophosphatemia E83.39    Malignant neoplasm of lower third of esophagus (HCC) C15.5    COVID-19 U07.1    Anxiety F41.9    Current smoker F17.200    Severe comorbid illness R69    Gait instability R26.81    Abnormal findings on diagnostic imaging of spine R93.7    Cervical spinal stenosis M48.02    Spinal stenosis of lumbar region with neurogenic claudication M48.062    Low hemoglobin D64.9    Symptomatic anemia, microcytic, acute D64.9    Hypotension I95.9    Former smoker, 50+ pack years, quit 2016 Z87.891    HLD (hyperlipidemia) E78.5    Esophageal adenocarcinoma (HCC) C15.9       Past Medical History:        Diagnosis Date    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 Hollis Villanueva, orthopedic, every 3-4 months, gets steroid injection    Lezama esophagus     BPH (benign prostatic hypertrophy)     Cholelithiasis     Cirrhosis ([address]     COVID-19 12/2020    pt reports he had a positive test while at [facility] in 2020, was asymptomatic    COVID-19 vaccine series completed 5/20/2021, 6/22/2021    Moderna 5/20/2021, 6/22/2021    DDD (degenerative disc disease), lumbar     Depression     Esophageal cancer ([address]     INVASIVE ADENOCARCINOMA ARISING IN TUBULAR ADENOMA WITH HIGH GRADE DYSPLASIA, ASSOCIATED WITH FOCAL INTESTINAL METAPLASIA     Esophageal varices ([address]     Fatty liver     GERD (gastroesophageal reflux disease)     Hep C w/o coma, chronic ([address]     History of alcohol abuse     6-12 beers a day; quit drinking July 2016    History of blood transfusion     History of colon polyps 2016    History of tobacco abuse     [place] (hard of hearing)     Hyperlipidemia     Hypertension     Port-A-Cath in place     right upper chest    Stomach ulcer     hx of    Tubular adenoma of colon 2016, 2018    Vitamin D deficiency     Wears glasses        Past Surgical History:        Procedure Laterality Date    BUNIONECTOMY      twice on right side    BUNIONECTOMY Left     CARPAL TUNNEL RELEASE Right     COLONOSCOPY      at age 35  COLONOSCOPY  10/05/2016    polyps-pathology tubular adenoma, and abnormal looking mucosa right colon-pathology-tubular adenoma    COLONOSCOPY N/A 3/30/2018    COLONOSCOPY POLYPECTOMY COLD BIOPSY performed by Enoch Solis MD at [address] [facility] [address]  03/30/2018    Small polyp in the sigmoid colon and excised with biopsy forceps--tubular adenoma    ENDOSCOPY, COLON, DIAGNOSTIC      EGD    IR PORT PLACEMENT EQUAL OR GREATER THAN 5 YEARS  4/19/2021    IR PORT PLACEMENT EQUAL OR GREATER THAN 5 YEARS 4/19/2021 [facility] SPECIAL PROCEDURES    KNEE SURGERY Left     cyst removed    NASAL SEPTUM SURGERY      NERVE BLOCK Right 11/23/2020    NERVE BLOCK RIGHT CERVICAL STEROID INJECTION  C3-C6 performed by Cheryl Doan MD at [address] [place] [address]  01/04/16    steroid injection C7 T1    OTHER SURGICAL HISTORY  11/21/2016    Bilateral Lumbar CACHORRO L4-L5 injections    OTHER SURGICAL HISTORY  12/19/2016    lumbar steroid injection    OTHER SURGICAL HISTORY  09/28/2018    BILATERAL L5 CACHORRO (N/A Back)    OTHER SURGICAL HISTORY Right 11/23/2020    cervical injection    PAIN MANAGEMENT PROCEDURE Left 7/9/2020    EPIDURAL STEROID INJECTION LEFT L4 L5 performed by Cheryl Doan MD at [facility] Left 7/20/2020    LEFT L4 L5 EPIDURAL STEROID INJECTION performed by Cheryl Doan MD at [address] [place] [address] Bilateral 8/17/2020    LUMBAR FACET BILATERAL L2-L5 performed by Cheryl Doan MD at [address] [place] [address] Bilateral 12/7/2020    [facility] L2-L5 performed by Cheryl Doan MD at [address] AA&/STRD TFRML EPI LUMBAR/SACRAL 1 LEVEL Bilateral 9/6/2018    BILATERAL L5 CACHORRO performed by Cheryl Doan MD at [address] AA&/STRD TFRML EPI LUMBAR/SACRAL 1 LEVEL N/A 9/28/2018    BILATERAL L5 CACHORRO performed by Cheryl Doan MD at [facility] Right 8/29/2017    [facility] 04/19/2012       CMP:   Lab Results   Component Value Date     01/20/2022    K 3.3 01/20/2022     01/20/2022    CO2 23 01/20/2022    BUN 11 01/20/2022    CREATININE 0.49 01/20/2022    GFRAA >60 01/20/2022    LABGLOM >60 01/20/2022    GLUCOSE 98 01/20/2022    GLUCOSE 65 04/19/2012    PROT 6.1 01/20/2022    CALCIUM 8.9 01/20/2022    BILITOT 2.38 01/20/2022    ALKPHOS 130 01/20/2022    AST 37 01/20/2022    ALT 11 01/20/2022       POC Tests: No results for input(s): POCGLU, POCNA, POCK, POCCL, POCBUN, POCHEMO, POCHCT in the last 72 hours. Coags:   Lab Results   Component Value Date    PROTIME 15.3 01/20/2022    INR 1.2 01/20/2022    APTT 35.1 04/19/2021       HCG (If Applicable): No results found for: PREGTESTUR, PREGSERUM, HCG, HCGQUANT     ABGs: No results found for: PHART, PO2ART, CJN8OUJ, NBN2MAQ, BEART, C8SUYBWM     Type & Screen (If Applicable):  No results found for: LABABO, LABRH    Drug/Infectious Status (If Applicable):  Lab Results   Component Value Date    HEPCAB REACTIVE 12/23/2020       COVID-19 Screening (If Applicable):   Lab Results   Component Value Date    COVID19 DETECTED 02/02/2021    COVID19 Not Detected 07/17/2020           Anesthesia Evaluation  Patient summary reviewed and Nursing notes reviewed no history of anesthetic complications:   Airway: Mallampati: II  TM distance: >3 FB   Neck ROM: full  Mouth opening: > = 3 FB Dental: normal exam         Pulmonary: breath sounds clear to auscultation                             Cardiovascular:    (+) hypertension: no interval change,       ECG reviewed  Rhythm: regular  Rate: normal  Echocardiogram reviewed               ROS comment: Left ventricle is normal in size. Mild left ventricular hypertrophy. Global left ventricular systolic function is normal. Estimated LV EF 60-65%.      Neuro/Psych:   (+) neuromuscular disease:, psychiatric history:            GI/Hepatic/Renal:   (+) GERD: no interval change, PUD, hepatitis: C, liver disease: esophageal varices,          ROS comment: Liver cirrhosis. Endo/Other: Negative Endo/Other ROS                    Abdominal:             Vascular: negative vascular ROS. Other Findings:           Anesthesia Plan      general     ASA 3       Induction: intravenous. Anesthetic plan and risks discussed with patient. Plan discussed with CRNA.                   Sabine Mason MD   1/21/2022

## 2022-01-24 ENCOUNTER — HOSPITAL ENCOUNTER (EMERGENCY)
Age: 62
Discharge: HOME | End: 2022-01-24
Attending: STUDENT IN AN ORGANIZED HEALTH CARE EDUCATION/TRAINING PROGRAM
Payer: MEDICARE

## 2022-01-24 ENCOUNTER — TELEPHONE (OUTPATIENT)
Dept: GASTROENTEROLOGY | Age: 62
End: 2022-01-24

## 2022-01-24 ENCOUNTER — HOSPITAL ENCOUNTER (OUTPATIENT)
Age: 62
Discharge: HOME | End: 2022-01-24
Payer: MEDICARE

## 2022-01-24 VITALS
DIASTOLIC BLOOD PRESSURE: 60 MMHG | HEART RATE: 60 BPM | RESPIRATION RATE: 17 BRPM | SYSTOLIC BLOOD PRESSURE: 110 MMHG | OXYGEN SATURATION: 99 % | TEMPERATURE: 98.4 F

## 2022-01-24 DIAGNOSIS — M54.2 NECK PAIN: Primary | ICD-10-CM

## 2022-01-24 LAB
HCT VFR BLD CALC: 30 % (ref 41–53)
HEMOGLOBIN: 9.6 G/DL (ref 13.5–17.5)
MCH RBC QN AUTO: 28.1 PG (ref 26–34)
MCHC RBC AUTO-ENTMCNC: 32.1 G/DL (ref 31–37)
MCV RBC AUTO: 87.5 FL (ref 80–100)
NRBC AUTOMATED: ABNORMAL PER 100 WBC
PDW BLD-RTO: 22.8 % (ref 11.5–14.9)
PLATELET # BLD: 147 K/UL (ref 150–450)
PMV BLD AUTO: 7 FL (ref 6–12)
RBC # BLD: 3.43 M/UL (ref 4.5–5.9)
WBC # BLD: 5.1 K/UL (ref 3.5–11)

## 2022-01-24 PROCEDURE — 6360000002 HC RX W HCPCS: Performed by: STUDENT IN AN ORGANIZED HEALTH CARE EDUCATION/TRAINING PROGRAM

## 2022-01-24 PROCEDURE — 85027 COMPLETE CBC AUTOMATED: CPT

## 2022-01-24 PROCEDURE — 6370000000 HC RX 637 (ALT 250 FOR IP): Performed by: STUDENT IN AN ORGANIZED HEALTH CARE EDUCATION/TRAINING PROGRAM

## 2022-01-24 PROCEDURE — 99283 EMERGENCY DEPT VISIT LOW MDM: CPT

## 2022-01-24 PROCEDURE — 36415 COLL VENOUS BLD VENIPUNCTURE: CPT

## 2022-01-24 PROCEDURE — 96372 THER/PROPH/DIAG INJ SC/IM: CPT

## 2022-01-24 RX ORDER — ORPHENADRINE CITRATE 30 MG/ML
60 INJECTION INTRAMUSCULAR; INTRAVENOUS ONCE
Status: COMPLETED | OUTPATIENT
Start: 2022-01-24 | End: 2022-01-24

## 2022-01-24 RX ORDER — LIDOCAINE 4 G/G
1 PATCH TOPICAL ONCE
Status: DISCONTINUED | OUTPATIENT
Start: 2022-01-24 | End: 2022-01-24 | Stop reason: HOSPADM

## 2022-01-24 RX ORDER — CYCLOBENZAPRINE HCL 10 MG
10 TABLET ORAL NIGHTLY PRN
Qty: 10 TABLET | Refills: 0 | Status: SHIPPED | OUTPATIENT
Start: 2022-01-24 | End: 2022-02-03

## 2022-01-24 RX ORDER — KETOROLAC TROMETHAMINE 30 MG/ML
15 INJECTION, SOLUTION INTRAMUSCULAR; INTRAVENOUS ONCE
Status: COMPLETED | OUTPATIENT
Start: 2022-01-24 | End: 2022-01-24

## 2022-01-24 RX ORDER — ACETAMINOPHEN 325 MG/1
650 TABLET ORAL ONCE
Status: DISCONTINUED | OUTPATIENT
Start: 2022-01-24 | End: 2022-01-24 | Stop reason: HOSPADM

## 2022-01-24 RX ADMIN — KETOROLAC TROMETHAMINE 15 MG: 30 INJECTION, SOLUTION INTRAMUSCULAR; INTRAVENOUS at 19:47

## 2022-01-24 RX ADMIN — ORPHENADRINE CITRATE 60 MG: 30 INJECTION INTRAMUSCULAR; INTRAVENOUS at 19:47

## 2022-01-24 ASSESSMENT — ENCOUNTER SYMPTOMS
DIARRHEA: 0
EYE PAIN: 0
COUGH: 0
NAUSEA: 0
RHINORRHEA: 0
FACIAL SWELLING: 0
COLOR CHANGE: 0
SHORTNESS OF BREATH: 0
SORE THROAT: 0
EYE REDNESS: 0
ABDOMINAL PAIN: 0
BACK PAIN: 0
VOMITING: 0

## 2022-01-24 ASSESSMENT — PAIN SCALES - GENERAL
PAINLEVEL_OUTOF10: 8
PAINLEVEL_OUTOF10: 0

## 2022-01-24 NOTE — ED NOTES
Patient to emergency department with complaints of left sided neck pain since this morning. Pt states he may have slept on it wrong or has a pinched nerve, and is \"unable to lift it off my chest\".  Denies trauma/injury, and numbness/tingling      Richard Fu RN  01/24/22 [address], RN  01/24/22 3593

## 2022-01-24 NOTE — TELEPHONE ENCOUNTER
Jamaica Morris called to verify the patient's next appointment and advised 2/18/22 2 pm.  Questioned his financial assistance discount and advised to call [phone] billing.

## 2022-01-25 LAB — SURGICAL PATHOLOGY REPORT: NORMAL

## 2022-01-25 NOTE — ED PROVIDER NOTES
EMERGENCY DEPARTMENT ENCOUNTER    Pt Name: Mauri De La Cruz  MRN: [id number]  [place] 1960  Date of evaluation: 1/24/22  CHIEF COMPLAINT       Chief Complaint   Patient presents with    Neck Pain     HISTORY OF PRESENT ILLNESS   HPI  16-year-old male history of chronic back pain, anxiety, arthritis presents for evaluation of neck pain. Symptoms started over the past 1 day. Symptoms again gradually. Describes left lateral sharp pain. No associated neurologic symptoms. No direct antecedent trauma. No fever chills. Described as sharp moderate and constant. Decreased mobility of his neck associated with it. No home treatment prior to arrival.  History of similar symptoms with flares of arthritis in the past      REVIEW OF SYSTEMS     Review of Systems   Constitutional: Negative for chills and fatigue. HENT: Negative for facial swelling, nosebleeds, rhinorrhea and sore throat. Eyes: Negative for pain and redness. Respiratory: Negative for cough and shortness of breath. Cardiovascular: Negative for chest pain and leg swelling. Gastrointestinal: Negative for abdominal pain, diarrhea, nausea and vomiting. Genitourinary: Negative for flank pain and hematuria. Musculoskeletal: Positive for neck pain. Negative for arthralgias and back pain. Skin: Negative for color change and rash. Neurological: Negative for dizziness, tremors, facial asymmetry, speech difficulty, weakness and numbness.      PASTMEDICAL HISTORY     Past Medical History:   Diagnosis Date    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 Rika Landin, orthopedic, every 3-4 months, gets steroid injection    Lezama esophagus     BPH (benign prostatic hypertrophy)     Cholelithiasis     [facility]osis ([facility] [address]     COVID-19 12/2020    pt reports he had a positive test while at [facility] in 2020, was asymptomatic    COVID-19 vaccine series completed 5/20/2021, 6/22/2021    Moderna 5/20/2021, 6/22/2021    DDD (degenerative disc disease), lumbar     Depression     Esophageal cancer (HCC)     INVASIVE ADENOCARCINOMA ARISING IN TUBULAR ADENOMA WITH HIGH GRADE DYSPLASIA, ASSOCIATED WITH FOCAL INTESTINAL METAPLASIA     Esophageal varices (HCC)     Fatty liver     GERD (gastroesophageal reflux disease)     Hep C w/o coma, chronic (HCC)     History of alcohol abuse     6-12 beers a day; quit drinking July 2016    History of blood transfusion     History of colon polyps 2016    History of tobacco abuse     [place] (hard of hearing)     Hyperlipidemia     Hypertension     Port-A-Cath in place     right upper chest    Stomach ulcer     hx of    Tubular adenoma of colon 2016, 2018    Vitamin D deficiency     Wears glasses      Past Problem List  Patient Active Problem List   Diagnosis Code    Back pain, chronic M54.9, G89.29    Hearing difficulty H91.90    GERD (gastroesophageal reflux disease) K21.9    Cervical radicular pain M54.12    Alcohol withdrawal syndrome without complication (HCC) J89.812    Hyponatremia E87.1    Hypomagnesemia E83.42    Chronic viral hepatitis B without delta agent and without coma (HCC) B18.1    Calculus of gallbladder without cholecystitis K80.20    Hep C w/o coma, chronic (HCC) B18.2    Fatty liver K76.0    Psychophysiologic insomnia F51.04    Cirrhosis (HCC) K74.60    Hepatic cirrhosis (HCC) K74.60    Vertebrogenic low back pain M54.51    DDD (degenerative disc disease), lumbar M51.36    Depression F32. A    Tubular adenoma of colon D12.6    History of colon polyps Z86.010    Gynecomastia, male N58    Lumbar radiculitis M54.16    Lumbar disc herniation M51.26    Tinnitus H93.19    Eustachian tube dysfunction H69.80    Ganglion cyst M67.40    Carpal tunnel syndrome of right wrist G56.01    History of hepatitis C Z86.19    Vitamin D deficiency E55.9    Pure hypercholesterolemia E78.00    Hypokalemia E87.6    Essential hypertension I10    Recurrent major depressive disorder in partial remission (HCC) F33.41    S/P epidural steroid injection Z92.241    Elevated LFTs R79.89    Seasonal allergies J30.2    Lumbar facet arthropathy M47.816    Cervical facet syndrome M47.812    Acute gastrointestinal bleeding K92.2    Thrombocytopenia (HCC) D69.6    Hepatitis C virus infection resolved after antiviral drug therapy Z86.19    Gastrointestinal hemorrhage with melena K92.1    Alcohol abuse F10.10    Altered mental status R41.82    Hypocalcemia E83.51    Hypophosphatemia E83.39    Malignant neoplasm of lower third of esophagus (HCC) C15.5    COVID-19 U07.1    Anxiety F41.9    Current smoker F17.200    Severe comorbid illness R69    Gait instability R26.81    Abnormal findings on diagnostic imaging of spine R93.7    Cervical spinal stenosis M48.02    Spinal stenosis of lumbar region with neurogenic claudication M48.062    Low hemoglobin D64.9    Symptomatic anemia, microcytic, acute D64.9    Hypotension I95.9    Former smoker, 50+ pack years, quit 2016 Z87.891    HLD (hyperlipidemia) E78.5    Esophageal adenocarcinoma (HCC) C15.9     SURGICAL HISTORY       Past Surgical History:   Procedure Laterality Date    BUNIONECTOMY      twice on right side    BUNIONECTOMY Left     CARPAL TUNNEL RELEASE Right     COLONOSCOPY      at age 35    COLONOSCOPY  10/05/2016    polyps-pathology tubular adenoma, and abnormal looking mucosa right colon-pathology-tubular adenoma    COLONOSCOPY N/A 3/30/2018    COLONOSCOPY POLYPECTOMY COLD BIOPSY performed by Vishnu Castro MD at [address] [place] [address]  03/30/2018    Small polyp in the sigmoid colon and excised with biopsy forceps--tubular adenoma    ENDOSCOPY, COLON, DIAGNOSTIC      EGD    IR PORT PLACEMENT EQUAL OR GREATER THAN 5 YEARS  4/19/2021    IR PORT PLACEMENT EQUAL OR GREATER THAN 5 YEARS 4/19/2021 [facility] PROCED[facility]    KNEE SURGERY Left     cyst removed    NASAL SEPTUM SURGERY      NERVE BLOCK Right 11/23/2020    NERVE BLOCK RIGHT CERVICAL STEROID INJECTION  C3-C6 performed by Lovely Ramirez MD at [facility]  01/04/16    steroid injection C7 T1    OTHER SURGICAL HISTORY  11/21/2016    Bilateral Lumbar CACHORRO L4-L5 injections    OTHER SURGICAL HISTORY  12/19/2016    lumbar steroid injection    OTHER SURGICAL HISTORY  09/28/2018    BILATERAL L5 CACHORRO (N/A Back)    OTHER SURGICAL HISTORY Right 11/23/2020    cervical injection    PAIN MANAGEMENT PROCEDURE Left 7/9/2020    EPIDURAL STEROID INJECTION LEFT L4 L5 performed by Lovely Ramirez MD at [place] [address] Left 7/20/2020    LEFT L4 L5 EPIDURAL STEROID INJECTION performed by Lovely Ramirez MD at [place] [address] Bilateral 8/17/2020    LUMBAR FACET BILATERAL L2-L5 performed by Lovely Ramirez MD at [place] [address] Bilateral 12/7/2020    NERVE BLOCK BILATERAL LUMBAR MEDIAL BRANCH L2-L5 performed by Lovely Ramirez MD at [address] AA&/STRD TFRML EPI LUMBAR/SACRAL 1 LEVEL Bilateral 9/6/2018    BILATERAL L5 CACHORRO performed by Lovely Ramirez MD at [address] AA&/STRD TFRML EPI LUMBAR/SACRAL 1 LEVEL N/A 9/28/2018    BILATERAL L5 CACHORRO performed by Lovely Ramirez MD at [address] [facility] N/CARPAL TUNNEL SURG Right 8/29/2017    CARPAL TUNNEL RELEASE RIGHT performed by Sabina Blanca MD at [facility] N/CARPAL TUNNEL SURG Left 10/31/2017    CARPAL TUNNEL RELEASE performed by Sabina Blanca MD at [address] [facility] 12/29/2020    EGD BIOPSY performed by Jarad Barksdale MD at [place] [address] 2/2/2021    EGD BIOPSY and spot marking performed by Staci Valentin MD at [place] [address] N/A 2/12/2021    ENDOSCOPIC ULTRASOUND, EGD performed by Loraine Castillo MD at [address] [place] [address]  2/12/2021 Vaping Use: Never used   Substance Use Topics    Alcohol use: Not Currently     Comment: quit 2019    Drug use: Not Currently     Frequency: 1.0 times per week     Comment: cocaine,  stopped spring 2016     PHYSICAL EXAM     INITIAL VITALS: /60   Pulse 60   Temp 98.4 °F (36.9 °C)   Resp 17   SpO2 99%    Physical Exam  Vitals and nursing note reviewed. Constitutional:       Appearance: Normal appearance. HENT:      Head: Normocephalic and atraumatic. Nose: Nose normal.   Eyes:      Extraocular Movements: Extraocular movements intact. Pupils: Pupils are equal, round, and reactive to light. Cardiovascular:      Rate and Rhythm: Normal rate and regular rhythm. Pulmonary:      Effort: Pulmonary effort is normal. No respiratory distress. Breath sounds: Normal breath sounds. Abdominal:      General: Abdomen is flat. There is no distension. Palpations: Abdomen is soft. There is no mass. Musculoskeletal:         General: No swelling. Normal range of motion. Cervical back: Normal range of motion. No rigidity. Comments: Left lateral paraspinal cervical tenderness no midline tenderness no deformity   Skin:     General: Skin is warm and dry. Neurological:      General: No focal deficit present. Mental Status: He is alert. Mental status is at baseline. Cranial Nerves: No cranial nerve deficit. MEDICAL DECISION MAKIN-year-old male presents for evaluation of lateralized neck pain over the past 1 day or so. Patient has significant palpable muscle spasm on exam.  No midline tenderness. No infectious symptoms. No direct antecedent trauma. Will treat with anti-inflammatories and muscle relaxers and reassess. Patient is feeling better after a shot of Norflex and Toradol and lidocaine patch.   We will discharge home with primary care follow-up         CRITICAL CARE:       PROCEDURES:    Procedures    DIAGNOSTIC RESULTS   EKG:All EKG's are interpreted by the Emergency Department Physician who either signs or Co-signs this chart in the absence of a cardiologist.        RADIOLOGY:All plain film, CT, MRI, and formal ultrasound images (except ED bedside ultrasound) are read by the radiologist, see reports below, unless otherwisenoted in MDM or here. No orders to display     LABS: All lab results were reviewed by myself, and all abnormals are listed below. Labs Reviewed - No data to display    EMERGENCY DEPARTMENTCOURSE:         Vitals:    Vitals:    01/24/22 1846 01/24/22 2041   BP: 109/63 110/60   Pulse: 64 60   Resp: 18 17   Temp: 98.4 °F (36.9 °C)    SpO2: 99% 99%       The patient was given the following medications while in the emergency department:  Orders Placed This Encounter   Medications    orphenadrine (NORFLEX) injection 60 mg    ketorolac (TORADOL) injection 15 mg    lidocaine 4 % external patch 1 patch    acetaminophen (TYLENOL) tablet 650 mg    cyclobenzaprine (FLEXERIL) 10 MG tablet     Sig: Take 1 tablet by mouth nightly as needed for Muscle spasms     Dispense:  10 tablet     Refill:  0     CONSULTS:  None    FINAL IMPRESSION      1. Neck pain          DISPOSITION/PLAN   DISPOSITION Decision To Discharge 01/24/2022 08:30:33 PM      PATIENT REFERRED TO:  Fran Little,  [place] [address] [phone]    Call   As needed    DISCHARGE MEDICATIONS:  Discharge Medication List as of 1/24/2022  8:31 PM      START taking these medications    Details   cyclobenzaprine (FLEXERIL) 10 MG tablet Take 1 tablet by mouth nightly as needed for Muscle spasms, Disp-10 tablet, R-0Print           The care is provided during an unprecedented national emergency due to the novel coronavirus, COVID 19.   MD Sarabjit Garcia MD  01/24/22 7225

## 2022-01-31 ENCOUNTER — HOSPITAL ENCOUNTER (OUTPATIENT)
Dept: INFUSION THERAPY | Age: 62
Discharge: HOME | End: 2022-01-31
Payer: MEDICARE

## 2022-01-31 ENCOUNTER — HOSPITAL ENCOUNTER (OUTPATIENT)
Dept: CT IMAGING | Age: 62
Discharge: HOME | End: 2022-02-02
Payer: MEDICARE

## 2022-01-31 DIAGNOSIS — C15.5 MALIGNANT NEOPLASM OF LOWER THIRD OF ESOPHAGUS (HCC): Primary | ICD-10-CM

## 2022-01-31 DIAGNOSIS — C15.5 MALIGNANT NEOPLASM OF LOWER THIRD OF ESOPHAGUS (HCC): ICD-10-CM

## 2022-01-31 DIAGNOSIS — R97.8 OTHER ABNORMAL TUMOR MARKERS: ICD-10-CM

## 2022-01-31 LAB
ABSOLUTE EOS #: 0.11 K/UL (ref 0–0.4)
ABSOLUTE IMMATURE GRANULOCYTE: ABNORMAL K/UL (ref 0–0.3)
ABSOLUTE LYMPH #: 0.58 K/UL (ref 1–4.8)
ABSOLUTE MONO #: 0.69 K/UL (ref 0.1–0.8)
ALBUMIN SERPL-MCNC: 2.6 G/DL (ref 3.5–5.2)
ALBUMIN/GLOBULIN RATIO: 0.9 (ref 1–2.5)
ALP BLD-CCNC: 122 U/L (ref 40–129)
ALT SERPL-CCNC: 12 U/L (ref 5–41)
ANION GAP SERPL CALCULATED.3IONS-SCNC: 10 MMOL/L (ref 9–17)
AST SERPL-CCNC: 34 U/L
BASOPHILS # BLD: 0 % (ref 0–2)
BASOPHILS ABSOLUTE: 0 K/UL (ref 0–0.2)
BILIRUB SERPL-MCNC: 0.63 MG/DL (ref 0.3–1.2)
BUN BLDV-MCNC: 3 MG/DL (ref 8–23)
BUN/CREAT BLD: ABNORMAL (ref 9–20)
CA 19-9: 35 U/ML (ref 0–35)
CALCIUM SERPL-MCNC: 8.2 MG/DL (ref 8.6–10.4)
CHLORIDE BLD-SCNC: 103 MMOL/L (ref 98–107)
CO2: 23 MMOL/L (ref 20–31)
CREAT SERPL-MCNC: 0.43 MG/DL (ref 0.7–1.2)
DIFFERENTIAL TYPE: ABNORMAL
EOSINOPHILS RELATIVE PERCENT: 2 % (ref 1–4)
GFR AFRICAN AMERICAN: >60 ML/MIN
GFR NON-AFRICAN AMERICAN: >60 ML/MIN
GFR SERPL CREATININE-BSD FRML MDRD: ABNORMAL ML/MIN/{1.73_M2}
GFR SERPL CREATININE-BSD FRML MDRD: ABNORMAL ML/MIN/{1.73_M2}
GLUCOSE BLD-MCNC: 103 MG/DL (ref 70–99)
HCT VFR BLD CALC: 29.3 % (ref 41–53)
HEMOGLOBIN: 9.5 G/DL (ref 13.5–17.5)
IMMATURE GRANULOCYTES: ABNORMAL %
LYMPHOCYTES # BLD: 11 % (ref 24–44)
MCH RBC QN AUTO: 27.6 PG (ref 26–34)
MCHC RBC AUTO-ENTMCNC: 32.3 G/DL (ref 31–37)
MCV RBC AUTO: 85.5 FL (ref 80–100)
MONOCYTES # BLD: 13 % (ref 1–7)
MORPHOLOGY: NORMAL
NRBC AUTOMATED: ABNORMAL PER 100 WBC
PDW BLD-RTO: 19.9 % (ref 12.5–15.4)
PLATELET # BLD: 287 K/UL (ref 140–450)
PLATELET ESTIMATE: ABNORMAL
PMV BLD AUTO: 7.4 FL (ref 6–12)
POTASSIUM SERPL-SCNC: 3.6 MMOL/L (ref 3.7–5.3)
RBC # BLD: 3.43 M/UL (ref 4.5–5.9)
RBC # BLD: ABNORMAL 10*6/UL
SEG NEUTROPHILS: 74 % (ref 36–66)
SEGMENTED NEUTROPHILS ABSOLUTE COUNT: 3.92 K/UL (ref 1.8–7.7)
SODIUM BLD-SCNC: 136 MMOL/L (ref 135–144)
TOTAL PROTEIN: 5.5 G/DL (ref 6.4–8.3)
WBC # BLD: 5.3 K/UL (ref 3.5–11)
WBC # BLD: ABNORMAL 10*3/UL

## 2022-01-31 PROCEDURE — 71260 CT THORAX DX C+: CPT

## 2022-01-31 PROCEDURE — 36415 COLL VENOUS BLD VENIPUNCTURE: CPT

## 2022-01-31 PROCEDURE — 80053 COMPREHEN METABOLIC PANEL: CPT

## 2022-01-31 PROCEDURE — 6360000002 HC RX W HCPCS: Performed by: INTERNAL MEDICINE

## 2022-01-31 PROCEDURE — 2580000003 HC RX 258: Performed by: INTERNAL MEDICINE

## 2022-01-31 PROCEDURE — 86301 IMMUNOASSAY TUMOR CA 19-9: CPT

## 2022-01-31 PROCEDURE — 6360000004 HC RX CONTRAST MEDICATION: Performed by: INTERNAL MEDICINE

## 2022-01-31 PROCEDURE — 85025 COMPLETE CBC W/AUTO DIFF WBC: CPT

## 2022-01-31 PROCEDURE — 36591 DRAW BLOOD OFF VENOUS DEVICE: CPT

## 2022-01-31 RX ORDER — SODIUM CHLORIDE 9 MG/ML
25 INJECTION, SOLUTION INTRAVENOUS PRN
Status: CANCELLED | OUTPATIENT
Start: 2022-01-31

## 2022-01-31 RX ORDER — SODIUM CHLORIDE 0.9 % (FLUSH) 0.9 %
5-40 SYRINGE (ML) INJECTION PRN
Status: CANCELLED | OUTPATIENT
Start: 2022-01-31

## 2022-01-31 RX ORDER — HEPARIN SODIUM (PORCINE) LOCK FLUSH IV SOLN 100 UNIT/ML 100 UNIT/ML
500 SOLUTION INTRAVENOUS PRN
Status: CANCELLED | OUTPATIENT
Start: 2022-01-31

## 2022-01-31 RX ORDER — HEPARIN SODIUM (PORCINE) LOCK FLUSH IV SOLN 100 UNIT/ML 100 UNIT/ML
500 SOLUTION INTRAVENOUS PRN
Status: DISCONTINUED | OUTPATIENT
Start: 2022-01-31 | End: 2022-02-01 | Stop reason: HOSPADM

## 2022-01-31 RX ORDER — SODIUM CHLORIDE 0.9 % (FLUSH) 0.9 %
10 SYRINGE (ML) INJECTION PRN
Status: DISCONTINUED | OUTPATIENT
Start: 2022-01-31 | End: 2022-02-03 | Stop reason: HOSPADM

## 2022-01-31 RX ORDER — SODIUM CHLORIDE 0.9 % (FLUSH) 0.9 %
5-40 SYRINGE (ML) INJECTION PRN
Status: DISCONTINUED | OUTPATIENT
Start: 2022-01-31 | End: 2022-02-01 | Stop reason: HOSPADM

## 2022-01-31 RX ORDER — 0.9 % SODIUM CHLORIDE 0.9 %
80 INTRAVENOUS SOLUTION INTRAVENOUS ONCE
Status: COMPLETED | OUTPATIENT
Start: 2022-01-31 | End: 2022-01-31

## 2022-01-31 RX ADMIN — HEPARIN 500 UNITS: 100 SYRINGE at 15:33

## 2022-01-31 RX ADMIN — SODIUM CHLORIDE, PRESERVATIVE FREE 10 ML: 5 INJECTION INTRAVENOUS at 15:26

## 2022-01-31 RX ADMIN — IOHEXOL 50 ML: 240 INJECTION, SOLUTION INTRATHECAL; INTRAVASCULAR; INTRAVENOUS; ORAL at 15:22

## 2022-01-31 RX ADMIN — SODIUM CHLORIDE, PRESERVATIVE FREE 10 ML: 5 INJECTION INTRAVENOUS at 14:10

## 2022-01-31 RX ADMIN — SODIUM CHLORIDE, PRESERVATIVE FREE 10 ML: 5 INJECTION INTRAVENOUS at 14:04

## 2022-01-31 RX ADMIN — IOPAMIDOL 100 ML: 755 INJECTION, SOLUTION INTRAVENOUS at 15:21

## 2022-01-31 RX ADMIN — SODIUM CHLORIDE 80 ML: 9 INJECTION, SOLUTION INTRAVENOUS at 15:21

## 2022-01-31 RX ADMIN — SODIUM CHLORIDE, PRESERVATIVE FREE 10 ML: 5 INJECTION INTRAVENOUS at 15:33

## 2022-02-01 ENCOUNTER — TELEPHONE (OUTPATIENT)
Dept: ONCOLOGY | Age: 62
End: 2022-02-01

## 2022-02-01 ENCOUNTER — OFFICE VISIT (OUTPATIENT)
Dept: ONCOLOGY | Age: 62
End: 2022-02-01
Payer: MEDICARE

## 2022-02-01 VITALS
WEIGHT: 231 LBS | DIASTOLIC BLOOD PRESSURE: 79 MMHG | BODY MASS INDEX: 33.15 KG/M2 | OXYGEN SATURATION: 99 % | HEART RATE: 74 BPM | TEMPERATURE: 97.3 F | SYSTOLIC BLOOD PRESSURE: 144 MMHG

## 2022-02-01 DIAGNOSIS — C15.9 ESOPHAGEAL ADENOCARCINOMA (HCC): ICD-10-CM

## 2022-02-01 PROCEDURE — 99214 OFFICE O/P EST MOD 30 MIN: CPT | Performed by: INTERNAL MEDICINE

## 2022-02-01 PROCEDURE — 99211 OFF/OP EST MAY X REQ PHY/QHP: CPT | Performed by: INTERNAL MEDICINE

## 2022-02-01 NOTE — TELEPHONE ENCOUNTER
PORT flush as scheduled  RTc 3 months with ct    Port flushes schedule through RV    RV scheduled 5/5/22 @ 10:30am    CT scheduled 4/25/22 @ 10am    PT was given AVS and an appt schedule    Electronically signed by Serene Sands on 2/1/2022 at 3:09 PM

## 2022-02-01 NOTE — TELEPHONE ENCOUNTER
Name: Shirlene Berkowitz  : 1960  MRN: [id number]    Oncology Navigation Follow-Up Note    Contact Type:  Telephone    Notes: Writer called pt caregiver Pierre to check on pt and how hes doing. She states hes doing well and remains in remission d/t recent CT scan and EGD. Writer informed her I would be ending navigation but did encourage her to keep my contact information for any future needs or questions. Pierre appreciative of call.     Electronically signed by Guillermina Perry RN on 2022 at 3:42 PM

## 2022-02-01 NOTE — PROGRESS NOTES
Patient ID: Karla Sandhu, [birth year], [id number], 57 y.o. Referred by : Dr Nicky Cesar  Reason for consultation:   Esophageal cancer T2N0Mx  Stage II   started on concurrent chemoradiation preoperatively on 5/4/21  Chemoradiation completed 6/9/21  Patient had a PET scan on 7/23/2021 which showed no evidence of recurrence  Patient has been evaluated by thoracic surgeon at [facility] Dr. Bel Saldaña and also hepatologist Dr. Tran Medrano his case was discussed at thoracic tumor oncology board with recommendations NOT to do any surgery because of his liver failure. So surveillance recommended  He had an upper endoscopy on 8/31 which showed no residual cancer but showed Lezmaa's esophagus with high-grade dysplasia. GI planning to consider RFA  HISTORY OF PRESENT ILLNESS:    Oncologic History:  Karla Sandhu is a 57 y.o. male with a history of hepatitis C, status post treatment, liver cirrhosis, history of alcohol abuse was seen during initial consultation visit for newly diagnosed esophageal cancer. Patient reportedly had \"heavy drinking alcohol in about 2016 however recently in December he had 2 beers and he presented with hematemesis. On 12/29/2020 he had upper endoscopy which showed severe portal hypertension, gastropathy. The biopsy from the GE junction showed invasive adenocarcinoma. Patient had a follow-up EGD on 2/2/2021 which confirmed esophageal adenocarcinoma. Patient had endoscopic ultrasound on 2/12/2021 which showed T2 N0 MX disease with underlying esophageal varices. In the esophagus hypoechoic lesion noted in the lower esophagus penetrating into submucosa and into muscularis propria. No lymphadenopathy noted. Patient was referred to medical oncology for further recommendations. He denies any difficulty swallowing. He does not smoke he has quit smoking in 2016. He has not drank alcohol since December. He has a history of hepatitis C and he has received treatment.     He lives by himself. He is accompanied by his ex-wife during today's office visit. INTERVAL HISTORY:   Patient is return for follow that and to discuss lab results, CT scan results, biopsy results and further recommendations. He was admitted to hospital in December for anemia and GI bleed and received PRBC transfusion and IV iron infusion. He recently had a endoscopy with gastroenterology and the biopsy did not show any evidence of malignancy. He reports he is feeling better. His CT scan showed no evidence of recurrence. He does have ascites and feels that his abdomen is distended but denies any pain. No bleeding symptoms at this point. During this visit patient's allergy, social, medical, surgical history and medications were reviewed and updated.     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 Demian Sandoval, orthopedic, every 3-4 months, gets steroid injection    Lezama esophagus     BPH (benign prostatic hypertrophy)     Cholelithiasis     Cirrhosis ([address]     COVID-19 12/2020    pt reports he had a positive test [facility], was asymptomatic    COVID-19 vaccine series completed 5/20/2021, 6/22/2021    Moderna 5/20/2021, 6/22/2021    DDD (degenerative disc disease), lumbar     Depression     Esophageal cancer ([address]     INVASIVE ADENOCARCINOMA ARISING IN TUBULAR ADENOMA WITH HIGH GRADE DYSPLASIA, ASSOCIATED WITH FOCAL INTESTINAL METAPLASIA     Esophageal varices ([address]     Fatty liver     GERD (gastroesophageal reflux disease)     Hep C w/o coma, chronic ([address]     History of alcohol abuse     6-12 beers a day; quit drinking July 2016    History of blood transfusion     History of colon polyps 2016    History of tobacco abuse     [place] (hard of hearing)     Hyperlipidemia     Hypertension     Port-A-Cath in place     right upper chest    Stomach ulcer     hx of    Tubular adenoma of colon 2016, 2018    Vitamin D deficiency     Wears glasses        Past Surgical History:   Procedure Laterality Date    BUNIONECTOMY      twice on right side    BUNIONECTOMY Left     CARPAL TUNNEL RELEASE Right     COLONOSCOPY      at age 35    COLONOSCOPY  10/05/2016    polyps-pathology tubular adenoma, and abnormal looking mucosa right colon-pathology-tubular adenoma    COLONOSCOPY N/A 3/30/2018    COLONOSCOPY POLYPECTOMY COLD BIOPSY performed by Yadiel Giron MD at [address] [facility] [address] [address]  03/30/2018    Small polyp in the sigmoid colon and excised with biopsy forceps--tubular adenoma    ENDOSCOPY, COLON, DIAGNOSTIC      EGD    IR PORT PLACEMENT EQUAL OR GREATER THAN 5 YEARS  4/19/2021    IR PORT PLACEMENT EQUAL OR GREATER THAN 5 YEARS 4/19/2021 [facility] PROCEDURES    KNEE SURGERY Left     cyst removed    NASAL SEPTUM SURGERY      NERVE BLOCK Right 11/23/2020    NERVE BLOCK RIGHT CERVICAL STEROID INJECTION  C3-C6 performed by Vazquez Spivey MD at [place] [address]  01/04/16    steroid injection C7 T1    OTHER SURGICAL HISTORY  11/21/2016    Bilateral Lumbar CACHORRO L4-L5 injections    OTHER SURGICAL HISTORY  12/19/2016    lumbar steroid injection    OTHER SURGICAL HISTORY  09/28/2018    BILATERAL L5 CACHORRO (N/A Back)    OTHER SURGICAL HISTORY Right 11/23/2020    cervical injection    PAIN MANAGEMENT PROCEDURE Left 7/9/2020    EPIDURAL STEROID INJECTION LEFT L4 L5 performed by Vazquez Spivey MD at [address] [facility] Left 7/20/2020    LEFT L4 L5 EPIDURAL STEROID INJECTION performed by Vazquez Spivey MD at [address] [facility] Bilateral 8/17/2020    LUMBAR FACET BILATERAL L2-L5 performed by Vazquez Spivey MD at [address] [facility] Bilateral 12/7/2020    NERVE BLOCK BILATERAL LUMBAR MEDIAL BRANCH L2-L5 performed by Vazquez Spivey MD at [address] AA&/STRD TFRML EPI LUMBAR/SACRAL 1 LEVEL Bilateral 9/6/2018    BILATERAL L5 CACHORRO performed by Hernán Harmon MD at [address] AA&/STRD TFRML EPI LUMBAR/SACRAL 1 LEVEL N/A 9/28/2018    BILATERAL L5 CACHORRO performed by Hernán Harmon MD at [address] N/CARPAL TUNNEL SURG Right 8/29/2017    CARPAL TUNNEL RELEASE RIGHT performed by Sherian Leyden, MD at [address] N/CARPAL TUNNEL SURG Left 10/31/2017    CARPAL TUNNEL RELEASE performed by Sherian Leyden, MD at [place] [address] 12/29/2020    EGD BIOPSY performed by Zaria Pryor MD at [place] [address] 2/2/2021    EGD BIOPSY and spot marking performed by Heather Lyons MD at [place] [address] 2/12/2021    ENDOSCOPIC ULTRASOUND, EGD performed by Vicente Cruz MD at [address] [place] [address]  2/12/2021    EGD DIAGNOSTIC ONLY performed by Vicente Cruz MD at [address] [place] [address] N/A 8/31/2021    EGD BIOPSY performed by Heather Lyons MD at [place] [address] 1/21/2022    EGD BIOPSY performed by Heather Lyons MD at [address] [facility]   Allergen Reactions    Bee Pollen Other (See Comments)     Runny nose, watery eyes    Pollen Extract      Runny nose, watery eyes       Current Outpatient Medications   Medication Sig Dispense Refill    cyclobenzaprine (FLEXERIL) 10 MG tablet Take 1 tablet by mouth nightly as needed for Muscle spasms 10 tablet 0    ferrous sulfate (IRON 325) 325 (65 Fe) MG tablet Take 1 tablet by mouth 2 times daily 60 tablet 5    vitamin D3 (CHOLECALCIFEROL) 25 MCG (1000 UT) TABS tablet Take 1 tablet by mouth daily 30 tablet 0    omeprazole (PRILOSEC) 40 MG delayed release capsule take 1 capsule by mouth EVERY MORNING BEFORE BREAKFAST 30 capsule 2    FLUoxetine (PROZAC) 20 MG capsule take 1 capsule by mouth once daily 30 capsule 3    lidocaine-prilocaine (EMLA) 2.5-2.5 % cream Apply topically 45-60 mins prior to needle poke daily PRN 1 Tube 2    hydrOXYzine (VISTARIL) 25 MG capsule take 1 capsule by mouth three times a day if needed for anxiety 90 capsule 2    atorvastatin (LIPITOR) 20 MG tablet take 1 tablet by mouth at bedtime 90 tablet 1    spironolactone (ALDACTONE) 50 MG tablet take 1 tablet by mouth once daily 90 tablet 3     No current facility-administered medications for this visit. Facility-Administered Medications Ordered in Other Visits   Medication Dose Route Frequency Provider Last Rate Last Admin    sodium chloride flush 0.9 % injection 10 mL  10 mL IntraVENous PRN Jerry Hogan MD   10 mL at 22 1526       Social History     Socioeconomic History    Marital status: Single     Spouse name: Not on file    Number of children: Not on file    Years of education: Not on file    Highest education level: Not on file   Occupational History    Not on file   Tobacco Use    Smoking status: Former Smoker     Packs/day: 1.00     Years: 45.00     Pack years: 45.00     Quit date: 2017     Years since quittin.0    Smokeless tobacco: Never Used   Vaping Use    Vaping Use: Never used   Substance and Sexual Activity    Alcohol use: Not Currently     Comment: quit     Drug use: Not Currently     Frequency: 1.0 times per week     Comment: cocaine,  stopped spring 2016    Sexual activity: Yes     Partners: Female   Other Topics Concern    Not on file   Social History Narrative     in the past, retired     Social Determinants of Health     Financial Resource Strain: [address] Difficulty of Paying Living Expenses: Not hard at all   Food Insecurity: No Food Insecurity    Worried About [address] in the Last Year: Never true   [address] in the Last Year: Never true   Transportation Needs: No Transportation Needs    Lack of Transportation (Medical):  No    Lack of Transportation (Non-Medical): No   Physical Activity:     Days of Exercise per Week: Not on file    Minutes of Exercise per Session: Not on file   Stress:     Feeling of Stress : Not on file   Social Connections:     Frequency of Communication with Friends and Family: Not on file    Frequency of Social Gatherings with Friends and Family: Not on file    Attends Sabianism Services: Not on file    Active Member of [address] [place] [address] or Organizations: Not on file    Attends Club or Organization Meetings: Not on file    Marital Status: Not on file   Intimate Partner Violence:     Fear of Current or Ex-Partner: Not on file    Emotionally Abused: Not on file    Physically Abused: Not on file    Sexually Abused: Not on file   Housing Stability:     Unable to Pay for Housing in the Last Year: Not on file    Number of [place] in the Last Year: Not on file    Unstable Housing in the Last Year: Not on file       Family History   Problem Relation Age of Onset    Cancer Mother         pancreatic    Cancer Father         bone    Diabetes Sister     Asthma Brother         REVIEW OF SYSTEM:     Constitutional: No fever or chills. No night sweats, no weight loss   Eyes: No eye discharge, double vision, or eye pain   HEENT: negative for sore mouth, sore throat, hoarseness and voice change   Respiratory: negative for cough , sputum, dyspnea, wheezing, hemoptysis, chest pain   Cardiovascular: negative for chest pain, dyspnea, palpitations, orthopnea, PND   Gastrointestinal: negative for nausea, vomiting, diarrhea, constipation, abdominal pain, Dysphagia, hematemesis and hematochezia   Genitourinary: negative for frequency, dysuria, nocturia, urinary incontinence, and hematuria   Integument: negative for rash, skin lesions, bruises.    Hematologic/Lymphatic: negative for easy bruising, bleeding, lymphadenopathy, petechiae and swelling/edema   Endocrine: negative for heat or cold intolerance, tremor, weight changes, change in bowel habits and hair loss   Musculoskeletal: negative for myalgias, arthralgias, pain, joint swelling,and bone pain   Neurological: negative for headaches, dizziness, seizures, weakness, numbness       OBJECTIVE:         Vitals:    02/01/22 1201   BP: (!) 144/79   Pulse: 74   Temp: 97.3 °F (36.3 °C)   SpO2: 99%       PHYSICAL EXAM:   General appearance - well appearing, no in pain or distress   Mental status - alert and cooperative   Eyes - pupils equal and reactive, extraocular eye movements intact   Ears - bilateral TM's and external ear canals normal   Mouth - mucous membranes moist, pharynx normal without lesions   Neck - supple, no significant adenopathy   Lymphatics - no palpable lymphadenopathy, no hepatosplenomegaly   Chest - clear to auscultation, no wheezes, rales or rhonchi, symmetric air entry   Heart - normal rate, regular rhythm, normal S1, S2, no murmurs, rubs, clicks or gallops   Abdomen - soft, nontender, nondistended, no masses or organomegaly   Neurological - alert, oriented, normal speech, no focal findings or movement disorder noted   Musculoskeletal - no joint tenderness, deformity or swelling   Extremities - peripheral pulses normal, no pedal edema, no clubbing or cyanosis   Skin - normal coloration and turgor, no rashes, no suspicious skin lesions noted   LABORATORY DATA:     Lab Results   Component Value Date    WBC 5.3 01/31/2022    HGB 9.5 (L) 01/31/2022    HCT 29.3 (L) 01/31/2022    MCV 85.5 01/31/2022     01/31/2022    LYMPHOPCT 11 (L) 01/31/2022    RBC 3.43 (L) 01/31/2022    MCH 27.6 01/31/2022    MCHC 32.3 01/31/2022    RDW 19.9 (H) 01/31/2022    MONOPCT 13 (H) 01/31/2022    BASOPCT 0 01/31/2022    NEUTROABS 3.92 01/31/2022    LYMPHSABS 0.58 (L) 01/31/2022    MONOSABS 0.69 01/31/2022    EOSABS 0.11 01/31/2022    BASOSABS 0.00 01/31/2022       Chemistry        Component Value Date/Time     01/31/2022 1404    K 3.6 (L) 01/31/2022 1404     01/31/2022 1404    CO2 23 01/31/2022 1404 BUN 3 (L) 01/31/2022 1404    CREATININE 0.43 (L) 01/31/2022 1404        Component Value Date/Time    CALCIUM 8.2 (L) 01/31/2022 1404    ALKPHOS 122 01/31/2022 1404    AST 34 01/31/2022 1404    ALT 12 01/31/2022 1404    BILITOT 0.63 01/31/2022 1404        PATHOLOGY DATA:   Surgical Pathology Report  Surgical Pathology  Collected: 12/29/20 1334   Lab status: Final   Resulting lab: [address] LAB   Value: [id number]   [address]   [address] [facility] [place], [address] [place] [address]   [phone]   Fax: [phone]   SURGICAL PATHOLOGY REPORT     Patient Name: Yoselyn Pimentel   MR#: [id number]   Specimen #[id number]         Final Diagnosis   GASTROESOPHAGEAL JUNCTION, BIOPSY:          INVASIVE ADENOCARCINOMA    Final Diagnosis   ESOPHAGUS, LESION AT 34 CM, BIOPSY:          INVASIVE ADENOCARCINOMA ARISING IN TUBULAR ADENOMA WITH HIGH   GRADE DYSPLASIA, ASSOCIATED WITH FOCAL INTESTINAL METAPLASIA (SEE   COMMENT)     Diagnosis Comment   The malignancy diagnosis is confirmed by review of a second   pathologist. Dorinda Man result of HER2 IHC with reflex to [address] [place] [address] for   gastroesophageal adenocarcinoma will be issued in an addendum.    ADDENDUM (SCM)     Date Ordered:     2/16/2021     Status: Signed Out        Date Complete:     2/16/2021     By: Hakeem Bennett M.D.        Date Reported:     2/16/2021       ADDENDUM COMMENT   This addendum is issued in order to incorporate the result of HER2 by   [address] [place] [address], performed at [address] [place] [address] ([id number], 02/16/2021).       \"RESULTS:  INDETERMINATE     Interpretation:     Average HER2 signals/nucleus:  4.4   Average SUNG 17 signals/nucleus:  3.4   HER2/SUNG 17 signal ratio:  1.3   Number of Observers:  2     Even after analysis of additional cells and review of slides by a   pathologist, FISH results show a HER2/SUNG 17 ratio < 2.0 and an   average of 4-6 HER2 signals per nucleus and 3 or more SUNG 17 signals   per nucleus.  The results are INDETERMINATE.       In this situation, the 2016 CAP/ASCP/ASCO guidelines recommend   selecting a different tumor block for HER2 testing, if available. \"       Of note, there is only one block available for testing of invasive   esophageal adenocarcinoma tumor cells.  It was already used for HER2   IHC and HER2 FISH testing with the results issued in the addendums.     IMAGING DATA:    Reviewed  CT chest abdomen pelvis 10/20/2021  Impression   1.  Stable exam of the chest, abdomen and pelvis without evidence for   metastatic disease.       2.  The esophagus is decompressed.  Mucosal enhancement in the distal   esophagus may be due to underlying varices.  Underlying inflammation or mass   is considered less likely given the stability of this finding and recent   negative PET-CT.       3.  Morphologic findings of cirrhosis and portal hypertension again   demonstrated.  No focal liver lesion identified.  Trace abdominopelvic   ascites.  Small varices. ASSESSMENT:    Jaleesa [place] is a 57 y.o. male with history of hepatitis C, liver cirrhosis, history of alcohol abuse, with esophageal cancer. I reviewed the NCCN guidelines and goals of care. Clinically appears to have T2 N0 M0  Stag II, disease. Started on preoperative  concurrent chemoradiation  Now he has completed chemoradiation  A PET scan on 7/21/21 after chemoradiation showed no metabolic evidence of active neoplasm. Induced at [place] [facility] recommended surveillance. During today's visit, the patient and the family had a number of reasonable questions which were answered to their satisfaction. They verbalized understanding of the information provided and they agreed to proceed as outlined above.      PLAN:   I reviewed his recent lab work, imaging studies, biopsy results and further recommendations   His biopsy from recent EGD showed no evidence of malignancy   His CT scan showed mild esophageal thickening but no other distant metastasis noted   Treatment could still be from postradiation changes   He will continue surveillance with GI for his liver cirrhosis and esophageal varices   I will plan to repeat his CT scan and lab work in 3 months  Return to clinic in 3 months with labs and scans prior    Garrison Robison MD  Hematologist/Medical Oncologist      This note is created with the assistance of a speech recognition program.  While intending to generate a document that actually reflects the content of the visit, the document can still have some errors including those of syntax and sound a like substitutions which may escape proof reading. It such instances, actual meaning can be extrapolated by contextual diversion.

## 2022-02-02 ENCOUNTER — APPOINTMENT (OUTPATIENT)
Dept: GENERAL RADIOLOGY | Age: 62
End: 2022-02-02
Payer: MEDICARE

## 2022-02-02 ENCOUNTER — HOSPITAL ENCOUNTER (EMERGENCY)
Age: 62
Discharge: HOME | End: 2022-02-02
Attending: EMERGENCY MEDICINE
Payer: MEDICARE

## 2022-02-02 VITALS
TEMPERATURE: 98.2 F | DIASTOLIC BLOOD PRESSURE: 83 MMHG | SYSTOLIC BLOOD PRESSURE: 109 MMHG | RESPIRATION RATE: 26 BRPM | HEART RATE: 89 BPM | OXYGEN SATURATION: 98 %

## 2022-02-02 DIAGNOSIS — R53.83 FATIGUE, UNSPECIFIED TYPE: Primary | ICD-10-CM

## 2022-02-02 LAB
ABSOLUTE EOS #: 0.07 K/UL (ref 0–0.4)
ABSOLUTE IMMATURE GRANULOCYTE: ABNORMAL K/UL (ref 0–0.3)
ABSOLUTE LYMPH #: 0.54 K/UL (ref 1–4.8)
ABSOLUTE MONO #: 0.6 K/UL (ref 0.1–1.3)
ALBUMIN SERPL-MCNC: 3.1 G/DL (ref 3.5–5.2)
ALBUMIN/GLOBULIN RATIO: ABNORMAL (ref 1–2.5)
ALP BLD-CCNC: 133 U/L (ref 40–129)
ALT SERPL-CCNC: 11 U/L (ref 5–41)
AMMONIA: 15 UMOL/L (ref 16–60)
ANION GAP SERPL CALCULATED.3IONS-SCNC: 13 MMOL/L (ref 9–17)
AST SERPL-CCNC: 31 U/L
BASOPHILS # BLD: 0 % (ref 0–2)
BASOPHILS ABSOLUTE: 0 K/UL (ref 0–0.2)
BILIRUB SERPL-MCNC: 0.68 MG/DL (ref 0.3–1.2)
BILIRUBIN URINE: NEGATIVE
BUN BLDV-MCNC: 4 MG/DL (ref 8–23)
BUN/CREAT BLD: ABNORMAL (ref 9–20)
CALCIUM SERPL-MCNC: 8.4 MG/DL (ref 8.6–10.4)
CHLORIDE BLD-SCNC: 101 MMOL/L (ref 98–107)
CO2: 23 MMOL/L (ref 20–31)
COLOR: YELLOW
COMMENT UA: NORMAL
CREAT SERPL-MCNC: 0.49 MG/DL (ref 0.7–1.2)
DIFFERENTIAL TYPE: ABNORMAL
EKG ATRIAL RATE: 83 BPM
EKG P-R INTERVAL: 152 MS
EKG Q-T INTERVAL: 396 MS
EKG QRS DURATION: 74 MS
EKG QTC CALCULATION (BAZETT): 465 MS
EKG R AXIS: 6 DEGREES
EKG T AXIS: -22 DEGREES
EKG VENTRICULAR RATE: 83 BPM
EOSINOPHILS RELATIVE PERCENT: 1 % (ref 0–4)
GFR AFRICAN AMERICAN: >60 ML/MIN
GFR NON-AFRICAN AMERICAN: >60 ML/MIN
GFR SERPL CREATININE-BSD FRML MDRD: ABNORMAL ML/MIN/{1.73_M2}
GFR SERPL CREATININE-BSD FRML MDRD: ABNORMAL ML/MIN/{1.73_M2}
GLUCOSE BLD-MCNC: 102 MG/DL (ref 70–99)
GLUCOSE URINE: NEGATIVE
HCT VFR BLD CALC: 30.7 % (ref 41–53)
HEMOGLOBIN: 9.6 G/DL (ref 13.5–17.5)
IMMATURE GRANULOCYTES: ABNORMAL %
INFLUENZA A: NOT DETECTED
INFLUENZA B: NOT DETECTED
INR BLD: 1.3
KETONES, URINE: NEGATIVE
LEUKOCYTE ESTERASE, URINE: NEGATIVE
LYMPHOCYTES # BLD: 8 % (ref 24–44)
MAGNESIUM: 1.6 MG/DL (ref 1.6–2.6)
MCH RBC QN AUTO: 26.5 PG (ref 26–34)
MCHC RBC AUTO-ENTMCNC: 31.2 G/DL (ref 31–37)
MCV RBC AUTO: 85 FL (ref 80–100)
MONOCYTES # BLD: 9 % (ref 1–7)
MORPHOLOGY: ABNORMAL
NITRITE, URINE: NEGATIVE
NRBC AUTOMATED: ABNORMAL PER 100 WBC
PDW BLD-RTO: 19.9 % (ref 11.5–14.9)
PH UA: 6 (ref 5–8)
PLATELET # BLD: 286 K/UL (ref 150–450)
PLATELET ESTIMATE: ABNORMAL
PMV BLD AUTO: 6.5 FL (ref 6–12)
POTASSIUM SERPL-SCNC: 4 MMOL/L (ref 3.7–5.3)
PROTEIN UA: NEGATIVE
PROTHROMBIN TIME: 15.8 SEC (ref 11.8–14.6)
RBC # BLD: 3.61 M/UL (ref 4.5–5.9)
RBC # BLD: ABNORMAL 10*6/UL
SARS-COV-2 RNA, RT PCR: NOT DETECTED
SEG NEUTROPHILS: 82 % (ref 36–66)
SEGMENTED NEUTROPHILS ABSOLUTE COUNT: 5.49 K/UL (ref 1.3–9.1)
SODIUM BLD-SCNC: 137 MMOL/L (ref 135–144)
SOURCE: NORMAL
SPECIFIC GRAVITY UA: 1.01 (ref 1–1.03)
SPECIMEN DESCRIPTION: NORMAL
TOTAL PROTEIN: 5.9 G/DL (ref 6.4–8.3)
TROPONIN INTERP: NORMAL
TROPONIN INTERP: NORMAL
TROPONIN T: NORMAL NG/ML
TROPONIN T: NORMAL NG/ML
TROPONIN, HIGH SENSITIVITY: 10 NG/L (ref 0–22)
TROPONIN, HIGH SENSITIVITY: 10 NG/L (ref 0–22)
TURBIDITY: CLEAR
URINE HGB: NEGATIVE
UROBILINOGEN, URINE: NORMAL
WBC # BLD: 6.7 K/UL (ref 3.5–11)
WBC # BLD: ABNORMAL 10*3/UL

## 2022-02-02 PROCEDURE — 36415 COLL VENOUS BLD VENIPUNCTURE: CPT

## 2022-02-02 PROCEDURE — 99284 EMERGENCY DEPT VISIT MOD MDM: CPT

## 2022-02-02 PROCEDURE — 80053 COMPREHEN METABOLIC PANEL: CPT

## 2022-02-02 PROCEDURE — 93005 ELECTROCARDIOGRAM TRACING: CPT | Performed by: EMERGENCY MEDICINE

## 2022-02-02 PROCEDURE — 85610 PROTHROMBIN TIME: CPT

## 2022-02-02 PROCEDURE — 85025 COMPLETE CBC W/AUTO DIFF WBC: CPT

## 2022-02-02 PROCEDURE — 87636 SARSCOV2 & INF A&B AMP PRB: CPT

## 2022-02-02 PROCEDURE — 93010 ELECTROCARDIOGRAM REPORT: CPT | Performed by: INTERNAL MEDICINE

## 2022-02-02 PROCEDURE — 71045 X-RAY EXAM CHEST 1 VIEW: CPT

## 2022-02-02 PROCEDURE — 84484 ASSAY OF TROPONIN QUANT: CPT

## 2022-02-02 PROCEDURE — 82140 ASSAY OF AMMONIA: CPT

## 2022-02-02 PROCEDURE — 83735 ASSAY OF MAGNESIUM: CPT

## 2022-02-02 PROCEDURE — 81003 URINALYSIS AUTO W/O SCOPE: CPT

## 2022-02-02 PROCEDURE — 87086 URINE CULTURE/COLONY COUNT: CPT

## 2022-02-02 ASSESSMENT — ENCOUNTER SYMPTOMS
ABDOMINAL PAIN: 0
COUGH: 0
NAUSEA: 0
DIARRHEA: 0

## 2022-02-02 NOTE — ED PROVIDER NOTES
EMERGENCY DEPARTMENT ENCOUNTER    Pt Name: Jarad Shipley  MRN: [id number]  [place] 1960  Date of evaluation: 2/2/22  CHIEF COMPLAINT       Chief Complaint   Patient presents with    Fatigue     HISTORY OF PRESENT ILLNESS     Fatigue  Severity:  Severe  Onset quality:  Gradual  Duration:  1 day  Timing:  Constant  Progression:  Unchanged  Chronicity:  New  Context comment:  Brandin Band last night at 1 am, no pain or injury, EMS came out he signed out AMA from them, told them he'd come to ED in am  Relieved by:  Rest  Worsened by: Activity  Ineffective treatments:  None tried  Associated symptoms: difficulty walking and falls    Associated symptoms: no abdominal pain, no chest pain, no cough, no diarrhea, no dizziness, no fever, no loss of consciousness, no nausea, no near-syncope, no seizures and no syncope    he states he falls a lot at home  Lives alone  Told yesterday that his esophageal cancer is resolved        REVIEW OF SYSTEMS     Review of Systems   Constitutional: Positive for fatigue. Negative for fever. Respiratory: Negative for cough. Cardiovascular: Negative for chest pain, syncope and near-syncope. Gastrointestinal: Negative for abdominal pain, diarrhea and nausea. Musculoskeletal: Positive for falls. Neurological: Negative for dizziness, seizures and loss of consciousness. All other systems reviewed and are negative.     PASTMEDICAL HISTORY     Past Medical History:   Diagnosis Date    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 ([facility]     Anxiety     Arthritis     Back pain, chronic     dr. Mare Michael, orthopedic, every 3-4 months, gets steroid injection    Lezama esophagus     BPH (benign prostatic hypertrophy)     Cholelithiasis     Cirrhosis ([facility]     COVID-19 12/2020    pt reports he had a positive test while at [facility] in 2020, was asymptomatic    COVID-19 vaccine series completed 5/20/2021, 6/22/2021    Moderna 5/20/2021, 6/22/2021    DDD (degenerative disc disease), lumbar     Depression     Esophageal cancer ([facility] [address] [facility]     INVASIVE ADENOCARCINOMA ARISING IN TUBULAR ADENOMA WITH HIGH GRADE DYSPLASIA, ASSOCIATED WITH FOCAL INTESTINAL METAPLASIA     Esophageal varices (HCC)     Fatty liver     GERD (gastroesophageal reflux disease)     Hep C w/o coma, chronic (HCC)     History of alcohol abuse     6-12 beers a day; quit drinking July 2016    History of blood transfusion     History of colon polyps 2016    History of tobacco abuse     [place] (hard of hearing)     Hyperlipidemia     Hypertension     Port-A-Cath in place     right upper chest    Stomach ulcer     hx of    Tubular adenoma of colon 2016, 2018    Vitamin D deficiency     Wears glasses      Past Problem List  Patient Active Problem List   Diagnosis Code    Back pain, chronic M54.9, G89.29    Hearing difficulty H91.90    GERD (gastroesophageal reflux disease) K21.9    Cervical radicular pain M54.12    Alcohol withdrawal syndrome without complication (HCC) N25.821    Hyponatremia E87.1    Hypomagnesemia E83.42    Chronic viral hepatitis B without delta agent and without coma (HCC) B18.1    Calculus of gallbladder without cholecystitis K80.20    Hep C w/o coma, chronic (HCC) B18.2    Fatty liver K76.0    Psychophysiologic insomnia F51.04    Cirrhosis (HCC) K74.60    Hepatic cirrhosis (HCC) K74.60    Vertebrogenic low back pain M54.51    DDD (degenerative disc disease), lumbar M51.36    Depression F32. A    Tubular adenoma of colon D12.6    History of colon polyps Z86.010    Gynecomastia, male N58    Lumbar radiculitis M54.16    Lumbar disc herniation M51.26    Tinnitus H93.19    Eustachian tube dysfunction H69.80    Ganglion cyst M67.40    Carpal tunnel syndrome of right wrist G56.01    History of hepatitis C Z86.19    Vitamin D deficiency E55.9    Pure hypercholesterolemia E78.00    Hypokalemia E87.6    Essential hypertension I10    Recurrent major depressive disorder in partial remission ([facility] [address] F33.41    S/P epidural steroid injection Z92.241    Elevated LFTs R79.89    Seasonal allergies J30.2    Lumbar facet arthropathy M47.816    Cervical facet syndrome M47.812    Acute gastrointestinal bleeding K92.2    Thrombocytopenia (HCC) D69.6    Hepatitis C virus infection resolved after antiviral drug therapy Z86.19    Gastrointestinal hemorrhage with melena K92.1    Alcohol abuse F10.10    Altered mental status R41.82    Hypocalcemia E83.51    Hypophosphatemia E83.39    Malignant neoplasm of lower third of esophagus (HCC) C15.5    COVID-19 U07.1    Anxiety F41.9    Current smoker F17.200    Severe comorbid illness R69    Gait instability R26.81    Abnormal findings on diagnostic imaging of spine R93.7    Cervical spinal stenosis M48.02    Spinal stenosis of lumbar region with neurogenic claudication M48.062    Low hemoglobin D64.9    Symptomatic anemia, microcytic, acute D64.9    Hypotension I95.9    Former smoker, 50+ pack years, quit 2016 Z87.891    HLD (hyperlipidemia) E78.5    Esophageal adenocarcinoma (HCC) C15.9     SURGICAL HISTORY       Past Surgical History:   Procedure Laterality Date    BUNIONECTOMY      twice on right side    BUNIONECTOMY Left     CARPAL TUNNEL RELEASE Right     COLONOSCOPY      at age 35    COLONOSCOPY  10/05/2016    polyps-pathology tubular adenoma, and abnormal looking mucosa right colon-pathology-tubular adenoma    COLONOSCOPY N/A 3/30/2018    COLONOSCOPY POLYPECTOMY COLD BIOPSY performed by Milton Delgadillo MD at [address] [facility],[address]  03/30/2018    Small polyp in the sigmoid colon and excised with biopsy forceps--tubular adenoma    ENDOSCOPY, COLON, DIAGNOSTIC      EGD    IR PORT PLACEMENT EQUAL OR GREATER THAN 5 YEARS  4/19/2021    IR PORT PLACEMENT EQUAL OR GREATER THAN 5 YEARS 4/19/2021 [facility]    KNEE SURGERY Left     cyst removed    NASAL SEPTUM SURGERY      NERVE BLOCK Right 11/23/2020    NERVE BLOCK RIGHT CERVICAL STEROID INJECTION  C3-C6 performed by Ash Ryan MD at [place] [address]  01/04/16    steroid injection C7 T1    OTHER SURGICAL HISTORY  11/21/2016    Bilateral Lumbar CACHORRO L4-L5 injections    OTHER SURGICAL HISTORY  12/19/2016    lumbar steroid injection    OTHER SURGICAL HISTORY  09/28/2018    BILATERAL L5 CACHORRO (N/A Back)    OTHER SURGICAL HISTORY Right 11/23/2020    cervical injection    PAIN MANAGEMENT PROCEDURE Left 7/9/2020    EPIDURAL STEROID INJECTION LEFT L4 L5 performed by Ash Ryan MD at [facility] Left 7/20/2020    LEFT L4 L5 EPIDURAL STEROID INJECTION performed by Ash Ryan MD at [facility] Bilateral 8/17/2020    LUMBAR FACET BILATERAL L2-L5 performed by Ash Ryan MD at [facility] Bilateral 12/7/2020    NERVE BLOCK BILATERAL LUMBAR MEDIAL BRANCH L2-L5 performed by Ash Ryan MD at [address] AA&/STRD TFRML EPI LUMBAR/SACRAL 1 LEVEL Bilateral 9/6/2018    BILATERAL L5 CACHORRO performed by Ash Ryan MD at [address] AA&/STRD TFRML EPI LUMBAR/SACRAL 1 LEVEL N/A 9/28/2018    BILATERAL L5 CACHORRO performed by Ash Ryan MD at [address]/CARPAL TUNNEL SURG Right 8/29/2017    CARPAL TUNNEL RELEASE RIGHT performed by Aleah Rodriguez MD at [address]/CARPAL TUNNEL SURG Left 10/31/2017    CARPAL TUNNEL RELEASE performed by Aleah Rodriguez MD at [facility] 12/29/2020    EGD BIOPSY performed by Lilibeth Berry MD at [facility] 2/2/2021    EGD BIOPSY and spot marking performed by Jovanny Loya MD at [address] [place] [address] N/A 2/12/2021    ENDOSCOPIC ULTRASOUND, EGD performed by Vero Gutiérrez MD at [address] [place] [address]  2/12/2021    EGD DIAGNOSTIC ONLY performed by Vero Gutiérrez MD at [facility]    UPPER GASTROINTESTINAL ENDOSCOPY N/A 2021    EGD BIOPSY performed by Staci Valentin MD at [address] [place] [address] 2022    EGD BIOPSY performed by Staci Valentin MD at [facility] [address]       Previous Medications    ATORVASTATIN (LIPITOR) 20 MG TABLET    take 1 tablet by mouth at bedtime    CYCLOBENZAPRINE (FLEXERIL) 10 MG TABLET    Take 1 tablet by mouth nightly as needed for Muscle spasms    FERROUS SULFATE (IRON 325) 325 (65 FE) MG TABLET    Take 1 tablet by mouth 2 times daily    FLUOXETINE (PROZAC) 20 MG CAPSULE    take 1 capsule by mouth once daily    HYDROXYZINE (VISTARIL) 25 MG CAPSULE    take 1 capsule by mouth three times a day if needed for anxiety    LIDOCAINE-PRILOCAINE (EMLA) 2.5-2.5 % CREAM    Apply topically 45-60 mins prior to needle poke daily PRN    OMEPRAZOLE (PRILOSEC) 40 MG DELAYED RELEASE CAPSULE    take 1 capsule by mouth EVERY MORNING BEFORE BREAKFAST    SPIRONOLACTONE (ALDACTONE) 50 MG TABLET    take 1 tablet by mouth once daily    VITAMIN D3 (CHOLECALCIFEROL) 25 MCG (1000 UT) TABS TABLET    Take 1 tablet by mouth daily     ALLERGIES     is allergic to bee pollen and pollen extract. FAMILY HISTORY     He indicated that his mother is . He indicated that his father is . He indicated that both of his sisters are alive. He indicated that his brother is alive.      SOCIAL HISTORY       Social History     Tobacco Use    Smoking status: Former Smoker     Packs/day: 1.00     Years: 45.00     Pack years: 45.00     Quit date: 2017     Years since quittin.0    Smokeless tobacco: Never Used   Vaping Use    Vaping Use: Never used   Substance Use Topics    Alcohol use: Not Currently     Comment: quit     Drug use: Not Currently     Frequency: 1.0 times per week     Comment: cocaine,  stopped spring 2016     PHYSICAL EXAM     INITIAL VITALS: /72   Pulse 88 Temp 98.2 °F (36.8 °C) (Oral)   Resp 23   SpO2 100%    Physical Exam  Constitutional:       General: He is not in acute distress. Appearance: Normal appearance. He is well-developed. He is obese. He is not ill-appearing or diaphoretic. HENT:      Head: Normocephalic and atraumatic. Right Ear: External ear normal.      Left Ear: External ear normal.      Nose: Nose normal. No congestion. Mouth/Throat:      Mouth: Mucous membranes are moist.      Pharynx: Oropharynx is clear. Eyes:      General:         Right eye: No discharge. Left eye: No discharge. Conjunctiva/sclera: Conjunctivae normal.      Pupils: Pupils are equal, round, and reactive to light. Neck:      Trachea: No tracheal deviation. Cardiovascular:      Rate and Rhythm: Normal rate and regular rhythm. Pulses: Normal pulses. Heart sounds: Normal heart sounds. Pulmonary:      Effort: Pulmonary effort is normal. No respiratory distress. Breath sounds: Normal breath sounds. No stridor. No wheezing or rales. Abdominal:      Palpations: Abdomen is soft. Tenderness: There is no abdominal tenderness. There is no guarding or rebound. Musculoskeletal:         General: No tenderness or deformity. Normal range of motion. Cervical back: Normal range of motion and neck supple. Skin:     General: Skin is warm and dry. Capillary Refill: Capillary refill takes less than 2 seconds. Coloration: Skin is pale. Findings: Bruising present. No erythema or rash. Neurological:      General: No focal deficit present. Mental Status: He is alert and oriented to person, place, and time. Coordination: Coordination normal.   Psychiatric:         Mood and Affect: Mood normal.         Behavior: Behavior normal.         Thought Content:  Thought content normal.         Judgment: Judgment normal.         MEDICAL DECISION MAKING:       ED Course as of 02/02/22 1301   Wed Feb 02, 2022   1242 Dw patient lab results [WM]      ED Course User Index  [WM] Cherry Goel MD     Do not suspect ami or sepsis or pe  He has a walker at home, he will start using it  He didn't eat yesterday and doesn't want food now, dw him increase po intake  He is calling a ride home  Discussed with patient anticipatory guidance, discharge instructions, follow up PCP 24 hours  He will call his PCP for PT eval  Procedures    DIAGNOSTIC RESULTS   EKG:All EKG's are interpreted by the Emergency Department Physician who either signs or Co-signs this chart in the absence of a cardiologist.  NSR, nonspecific changes, no acute ischemic changes on ST segments, no change compared to old, normal rate and normal intervals        RADIOLOGY:All plain film, CT, MRI, and formal ultrasound images (except ED bedside ultrasound) are read by the radiologist, see reports below, unless otherwisenoted in MDM or here. XR CHEST PORTABLE   Final Result   Unchanged appearance of the chest without acute airspace disease identified. LABS: All lab results were reviewed by myself, and all abnormals are listed below.   Labs Reviewed   CBC WITH AUTO DIFFERENTIAL - Abnormal; Notable for the following components:       Result Value    RBC 3.61 (*)     Hemoglobin 9.6 (*)     Hematocrit 30.7 (*)     RDW 19.9 (*)     Seg Neutrophils 82 (*)     Lymphocytes 8 (*)     Monocytes 9 (*)     Absolute Lymph # 0.54 (*)     All other components within normal limits   COMPREHENSIVE METABOLIC PANEL - Abnormal; Notable for the following components:    Glucose 102 (*)     BUN 4 (*)     CREATININE 0.49 (*)     Calcium 8.4 (*)     Alkaline Phosphatase 133 (*)     Total Protein 5.9 (*)     Albumin 3.1 (*)     All other components within normal limits   PROTIME-INR - Abnormal; Notable for the following components:    Protime 15.8 (*)     All other components within normal limits   AMMONIA - Abnormal; Notable for the following components:    Ammonia 15 (*)     All other components within normal limits   COVID-19 & INFLUENZA COMBO   CULTURE, URINE   TROPONIN   TROPONIN   URINE RT REFLEX TO CULTURE   MAGNESIUM       EMERGENCY DEPARTMENTCOURSE:         Vitals:    Vitals:    02/02/22 1057 02/02/22 1127 02/02/22 1158 02/02/22 1230   BP: 125/74 124/75 122/77 122/72   Pulse: 103 89 104 88   Resp: 28 23 28 23   Temp:       TempSrc:       SpO2: 97% 98% 99% 100%       FINAL IMPRESSION      1. Fatigue, unspecified type          DISPOSITION/PLAN   DISPOSITION Decision To Discharge 02/02/2022 12:57:57 PM      PATIENT REFERRED TO:  Anusha Reese, [address] [place] [address]  [phone]    Schedule an appointment as soon as possible for a visit in 1 day      DISCHARGE MEDICATIONS:  New Prescriptions    No medications on file     The care is provided during an unprecedented national emergency due to the novel coronavirus, COVID 19.   MD Elle Funes MD  02/02/22 0220

## 2022-02-02 NOTE — FLOWSHEET NOTE
Mode of arrival (squad #, walk in, police, etc) : [facility] complaint(s): Weakness        Arrival Note (brief scenario, treatment PTA, etc). : Patient presents to ED for generalized weakness that started last night. Patient had fall last night around 1000pm but patient did not want to come to ED. Patient states this morning when he had awaken he was unable to get out of bed so he called EMS. Patient states he has had this happen before. Patient states recently he has been in and out of hospital. Patient denies chest pain, shortness of breath, fever, chills, headache, abdominal pain, numbness, tingling. C= \"Have you ever felt that you should Cut down on your drinking? \"  No  A= \"Have people Annoyed you by criticizing your drinking? \"  No  G= \"Have you ever felt bad or Guilty about your drinking? \"  No  E= \"Have you ever had a drink as an Eye-opener first thing in the morning to steady your nerves or to help a hangover? \"  No      Deferred []      Reason for deferring: N/A    *If yes to two or more: probable alcohol abuse. *

## 2022-02-03 LAB
CULTURE: NO GROWTH
Lab: NORMAL
SPECIMEN DESCRIPTION: NORMAL

## 2022-02-08 ENCOUNTER — OFFICE VISIT (OUTPATIENT)
Dept: GASTROENTEROLOGY | Age: 62
End: 2022-02-08
Payer: MEDICARE

## 2022-02-08 VITALS
OXYGEN SATURATION: 97 % | HEIGHT: 70 IN | WEIGHT: 229.4 LBS | DIASTOLIC BLOOD PRESSURE: 73 MMHG | BODY MASS INDEX: 32.84 KG/M2 | SYSTOLIC BLOOD PRESSURE: 130 MMHG | HEART RATE: 71 BPM | TEMPERATURE: 97.1 F

## 2022-02-08 DIAGNOSIS — K31.89 PORTAL HYPERTENSIVE GASTROPATHY (HCC): ICD-10-CM

## 2022-02-08 DIAGNOSIS — K76.9 LIVER DISEASE, CHRONIC: ICD-10-CM

## 2022-02-08 DIAGNOSIS — K70.31 ASCITES DUE TO ALCOHOLIC CIRRHOSIS (HCC): Primary | ICD-10-CM

## 2022-02-08 DIAGNOSIS — C15.5 MALIGNANT NEOPLASM OF LOWER THIRD OF ESOPHAGUS (HCC): ICD-10-CM

## 2022-02-08 DIAGNOSIS — K76.6 PORTAL HYPERTENSIVE GASTROPATHY (HCC): ICD-10-CM

## 2022-02-08 DIAGNOSIS — K22.711 BARRETT'S ESOPHAGUS WITH HIGH GRADE DYSPLASIA: ICD-10-CM

## 2022-02-08 DIAGNOSIS — K70.30 ALCOHOLIC CIRRHOSIS OF LIVER WITHOUT ASCITES (HCC): ICD-10-CM

## 2022-02-08 DIAGNOSIS — Z86.19 HISTORY OF HEPATITIS C: ICD-10-CM

## 2022-02-08 PROCEDURE — 99214 OFFICE O/P EST MOD 30 MIN: CPT | Performed by: INTERNAL MEDICINE

## 2022-02-08 RX ORDER — NADOLOL 20 MG/1
20 TABLET ORAL DAILY
Qty: 30 TABLET | Refills: 3 | Status: SHIPPED | OUTPATIENT
Start: 2022-02-08 | End: 2022-05-05 | Stop reason: ALTCHOICE

## 2022-02-08 RX ORDER — FUROSEMIDE 20 MG/1
20 TABLET ORAL DAILY
Qty: 60 TABLET | Refills: 0 | Status: SHIPPED | OUTPATIENT
Start: 2022-02-08 | End: 2022-04-04

## 2022-02-08 ASSESSMENT — ENCOUNTER SYMPTOMS
ANAL BLEEDING: 1
ABDOMINAL DISTENTION: 1
SHORTNESS OF BREATH: 0
ALLERGIC/IMMUNOLOGIC NEGATIVE: 1
ABDOMINAL PAIN: 1
DIARRHEA: 1
RESPIRATORY NEGATIVE: 1
NAUSEA: 1
VOMITING: 0
BACK PAIN: 0
BLOOD IN STOOL: 1
TROUBLE SWALLOWING: 0
SORE THROAT: 0
RECTAL PAIN: 0
CHOKING: 0
COUGH: 0
CONSTIPATION: 0
VOICE CHANGE: 0

## 2022-02-08 NOTE — PROGRESS NOTES
GI OFFICE FOLLOW UP    INTERVAL HISTORY:   No referring provider defined for this encounter. No chief complaint on file. 1. Ascites due to alcoholic cirrhosis ([facility]    2. Liver disease, chronic    3. Alcoholic cirrhosis of liver without ascites ([facility]    4. Malignant neoplasm of lower third of esophagus (HCC)    5. Lezama's esophagus with high grade dysplasia    6. Portal hypertensive gastropathy ([facility]    7. History of hepatitis C              HISTORY OF PRESENT ILLNESS: Shabbir Coreas is a 57 y.o. male with a past history remarkable for ,   Patient seen along with his wife. This patient is known to have alcoholic liver disease, portal hypertensive gastropathy, Lezama's esophagitis. Also patient was noted to have small lesion at the GE junction, biopsies revealed adenocarcinoma for which he was further evaluated locally and also at [address] [place] [address],[address] and felt that he is not a surgical candidate. Subsequently he is being followed by oncology service. Recently patient had a melanotic stools. He had a EGD done and was found to have significant portal hypertensive gastropathy with oozing of blood. Also also found to have Lezama's mucosa. Multiple biopsies taken from the GE junction where there was malignant lesion. At present no obvious lesions at that site seen. The histology from the site did not reveal malignancy. His repeat hemoglobin on 2-20 22 is about 10 g. Patient bowel movements normal color. On further discussion it appears that patient continues to drink alcohol on daily basis. Is not following salt restricted diet. He has been on Aldactone 50 mg a day. During this visit his blood pressure 130/80. His labs 8/19/2021 did not reveal hepatitis C RNA.   Past Medical,Family, and Social History reviewed and does contribute to the patient presenting condition. Patient's PMH/PSH,SH,PSYCH Hx, MEDs, ALLERGIES, and ROS were all reviewed and updated in the appropriate sections.  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 Rika Landin, orthopedic, every 3-4 months, gets steroid injection    Lezama esophagus     BPH (benign prostatic hypertrophy)     Cholelithiasis     Cirrhosis ([address]     COVID-19 12/2020    pt reports he had a positive test while at [facility] in 2020, was asymptomatic    COVID-19 vaccine series completed 5/20/2021, 6/22/2021    Moderna 5/20/2021, 6/22/2021    DDD (degenerative disc disease), lumbar     Depression     Esophageal cancer ([address]     INVASIVE ADENOCARCINOMA ARISING IN TUBULAR ADENOMA WITH HIGH GRADE DYSPLASIA, ASSOCIATED WITH FOCAL INTESTINAL METAPLASIA     Esophageal varices ([address]     Fatty liver     GERD (gastroesophageal reflux disease)     Hep C w/o coma, chronic ([address]     History of alcohol abuse     6-12 beers a day; quit drinking July 2016    History of blood transfusion     History of colon polyps 2016    History of tobacco abuse     Iroquois (hard of hearing)     Hyperlipidemia     Hypertension     Port-A-Cath in place     right upper chest    Stomach ulcer     hx of    Tubular adenoma of colon 2016, 2018    Vitamin D deficiency     Wears glasses        Past Surgical History:   Procedure Laterality Date    BUNIONECTOMY      twice on right side    BUNIONECTOMY Left     CARPAL TUNNEL RELEASE Right     COLONOSCOPY      at age 35    COLONOSCOPY  10/05/2016    polyps-pathology tubular adenoma, and abnormal looking mucosa right colon-pathology-tubular adenoma    COLONOSCOPY N/A 3/30/2018    COLONOSCOPY POLYPECTOMY COLD BIOPSY performed by Paula Faulkner MD at [place] [address]  03/30/2018    Small polyp in the sigmoid colon and excised with biopsy forceps--tubular adenoma    ENDOSCOPY, COLON, DIAGNOSTIC      EGD    IR PORT PLACEMENT EQUAL OR GREATER THAN 5 YEARS  4/19/2021    IR PORT PLACEMENT EQUAL OR GREATER THAN 5 YEARS 4/19/2021 [facility] PROCEDURES    KNEE SURGERY Left     cyst removed    NASAL SEPTUM SURGERY      NERVE BLOCK Right 11/23/2020    NERVE BLOCK RIGHT CERVICAL STEROID INJECTION  C3-C6 performed by Ernesto Gamez MD at [place] [address]  01/04/16    steroid injection C7 T1    OTHER SURGICAL HISTORY  11/21/2016    Bilateral Lumbar CACHORRO L4-L5 injections    OTHER SURGICAL HISTORY  12/19/2016    lumbar steroid injection    OTHER SURGICAL HISTORY  09/28/2018    BILATERAL L5 CACHORRO (N/A Back)    OTHER SURGICAL HISTORY Right 11/23/2020    cervical injection    PAIN MANAGEMENT PROCEDURE Left 7/9/2020    EPIDURAL STEROID INJECTION LEFT L4 L5 performed by Ernesto Gamez MD at [place] [address] Left 7/20/2020    LEFT L4 L5 EPIDURAL STEROID INJECTION performed by Ernesto Gamez MD at [place] [address] Bilateral 8/17/2020    LUMBAR FACET BILATERAL L2-L5 performed by Ernesto Gamez MD at [place] [address] Bilateral 12/7/2020    NERVE BLOCK BILATERAL LUMBAR MEDIAL BRANCH L2-L5 performed by Ernesto Gamez MD at [address] AA&/STRD TFRML EPI LUMBAR/SACRAL 1 LEVEL Bilateral 9/6/2018    BILATERAL L5 CACHORRO performed by Ernesto Gamez MD at [address] AA&/STRD TFRML EPI LUMBAR/SACRAL 1 LEVEL N/A 9/28/2018    BILATERAL L5 CACHORRO performed by Ernesto Gamez MD at [address]/CARPAL TUNNEL SURG Right 8/29/2017    CARPAL TUNNEL RELEASE RIGHT performed by Ev Law MD at [address]/CARPAL TUNNEL SURG Left 10/31/2017    CARPAL TUNNEL RELEASE performed by Ev Law MD at [address] 12/29/2020    EGD BIOPSY performed by Neto Reyes MD at [address] 2/2/2021    EGD BIOPSY and spot marking performed by Jemma Arce MD at [place] [address] N/A 2/12/2021    ENDOSCOPIC ULTRASOUND, EGD performed by Mikael Ferguson MD at [address] [place] [address]  2/12/2021    EGD DIAGNOSTIC ONLY performed by Mikael Ferguson MD at [facility]    UPPER GASTROINTESTINAL ENDOSCOPY N/A 8/31/2021    EGD BIOPSY performed by Jemma Arce MD at [place] [address] N/A 1/21/2022    EGD BIOPSY performed by Jemma Arce MD at [facility]:    Current Outpatient Medications:     nadolol (CORGARD) 20 MG tablet, Take 1 tablet by mouth daily, Disp: 30 tablet, Rfl: 3    furosemide (LASIX) 20 MG tablet, Take 1 tablet by mouth daily, Disp: 60 tablet, Rfl: 0    ferrous sulfate (IRON 325) 325 (65 Fe) MG tablet, Take 1 tablet by mouth 2 times daily, Disp: 60 tablet, Rfl: 5    vitamin D3 (CHOLECALCIFEROL) 25 MCG (1000 UT) TABS tablet, Take 1 tablet by mouth daily, Disp: 30 tablet, Rfl: 0    omeprazole (PRILOSEC) 40 MG delayed release capsule, take 1 capsule by mouth EVERY MORNING BEFORE BREAKFAST, Disp: 30 capsule, Rfl: 2    FLUoxetine (PROZAC) 20 MG capsule, take 1 capsule by mouth once daily, Disp: 30 capsule, Rfl: 3    lidocaine-prilocaine (EMLA) 2.5-2.5 % cream, Apply topically 45-60 mins prior to needle poke daily PRN, Disp: 1 Tube, Rfl: 2    hydrOXYzine (VISTARIL) 25 MG capsule, take 1 capsule by mouth three times a day if needed for anxiety, Disp: 90 capsule, Rfl: 2    atorvastatin (LIPITOR) 20 MG tablet, take 1 tablet by mouth at bedtime, Disp: 90 tablet, Rfl: 1    spironolactone (ALDACTONE) 50 MG tablet, take 1 tablet by mouth once daily, Disp: 90 tablet, Rfl: 3    ALLERGIES:   Allergies   Allergen Reactions    Bee Pollen Other (See Comments)     Runny nose, watery eyes    Pollen Extract      Runny nose, watery eyes       FAMILY HISTORY:       Problem Relation Age of Onset    Cancer Mother         pancreatic    Cancer Father         bone    Diabetes Sister     Asthma Brother          SOCIAL HISTORY:   Social History     Socioeconomic History    Marital status: Single     Spouse name: Not on file    Number of children: Not on file    Years of education: Not on file    Highest education level: Not on file   Occupational History    Not on file   Tobacco Use    Smoking status: Former Smoker     Packs/day: 1.00     Years: 45.00     Pack years: 45.00     Quit date: 2017     Years since quittin.0    Smokeless tobacco: Never Used   Vaping Use    Vaping Use: Never used   Substance and Sexual Activity    Alcohol use: Not Currently     Comment: quit     Drug use: Not Currently     Frequency: 1.0 times per week     Comment: cocaine,  stopped spring 2016    Sexual activity: Yes     Partners: Female   Other Topics Concern    Not on file   Social History Narrative     in the past, retired     Social Determinants of Health     Financial Resource Strain: Low Risk     Difficulty of Paying Living Expenses: Not hard at all   Food Insecurity: No Food Insecurity    Worried About [address] in the Last Year: Never true   [address] in the Last Year: Never true   Transportation Needs: No Transportation Needs    Lack of Transportation (Medical): No    Lack of Transportation (Non-Medical):  No   Physical Activity:     Days of Exercise per Week: Not on file    Minutes of Exercise per Session: Not on file   Stress:     Feeling of Stress : Not on file   Social Connections:     Frequency of Communication with Friends and Family: Not on file    Frequency of Social Gatherings with Friends and Family: Not on file    Attends Voodoo Services: Not on file    Active Member of Clubs or Organizations: Not on file    Attends Club or Organization Meetings: Not on file    Marital Status: Not on file   Intimate Partner Violence:     Fear of Current or Ex-Partner: Not on file    Emotionally Abused: Not on file    Physically Abused: Not on file    Sexually Abused: Not on file   Housing Stability:     Unable to Pay for Housing in the Last Year: Not on file    Number of [place] in the Last Year: Not on file    Unstable Housing in the Last Year: Not on file         REVIEW OF SYSTEMS:         Review of Systems   Constitutional: Positive for fatigue. Negative for appetite change and unexpected weight change. HENT: Positive for hearing loss (rt ear). Negative for sore throat, trouble swallowing and voice change. Eyes: Positive for visual disturbance (wears glasses). Respiratory: Negative. Negative for cough, choking and shortness of breath. Cardiovascular: Negative. Negative for chest pain and leg swelling. Gastrointestinal: Positive for abdominal distention, abdominal pain, anal bleeding, blood in stool, diarrhea and nausea. Negative for constipation, rectal pain and vomiting. Denies   Endocrine: Negative. Negative for polydipsia, polyphagia and polyuria. Genitourinary: Negative for difficulty urinating, frequency, hematuria and urgency. Musculoskeletal: Negative for back pain, joint swelling and myalgias. Skin: Negative. Allergic/Immunologic: Negative. Negative for environmental allergies, food allergies and immunocompromised state. Neurological: Negative for dizziness, tremors, weakness, light-headedness, numbness and headaches. Hematological: Bruises/bleeds easily. Psychiatric/Behavioral: Positive for sleep disturbance. The patient is nervous/anxious. PHYSICAL EXAMINATION:     Vital signs reviewed per the nursing documentation. /73   Pulse 71   Temp 97.1 °F (36.2 °C)   Ht 5' 10\" (1.778 m)   Wt 229 lb 6.4 oz (104.1 kg)   SpO2 97%   BMI 32.92 kg/m²   Body mass index is 32.92 kg/m².    Physical Exam      LABORATORY DATA: Reviewed  Lab Results   Component Value Date    WBC 6.7 02/02/2022    HGB 9.6 (L) 02/02/2022    HCT 30.7 (L) 02/02/2022    MCV 85.0 02/02/2022     02/02/2022     02/02/2022    K 4.0 02/02/2022     02/02/2022    CO2 23 02/02/2022    BUN 4 (L) 02/02/2022    CREATININE 0.49 (L) 02/02/2022    LABPROT 7.7 04/19/2012    LABALBU 3.1 (L) 02/02/2022    BILITOT 0.68 02/02/2022    ALKPHOS 133 (H) 02/02/2022    AST 31 02/02/2022    ALT 11 02/02/2022    INR 1.3 02/02/2022         Lab Results   Component Value Date    RBC 3.61 (L) 02/02/2022    HGB 9.6 (L) 02/02/2022    MCV 85.0 02/02/2022    MCH 26.5 02/02/2022    MCHC 31.2 02/02/2022    RDW 19.9 (H) 02/02/2022    MPV 6.5 02/02/2022    BASOPCT 0 02/02/2022    LYMPHSABS 0.54 (L) 02/02/2022    MONOSABS 0.60 02/02/2022    NEUTROABS 5.49 02/02/2022    EOSABS 0.07 02/02/2022    BASOSABS 0.00 02/02/2022         DIAGNOSTIC TESTING:     XR CHEST PORTABLE    Result Date: 2/2/2022  EXAMINATION: ONE XRAY VIEW OF THE CHEST 2/2/2022 9:06 am COMPARISON: Chest radiograph 12/21/2021. Chest CT 01/31/2022. HISTORY: ORDERING SYSTEM PROVIDED HISTORY: fatigue TECHNOLOGIST PROVIDED HISTORY: fatigue FINDINGS: The cardiomediastinal silhouette is unchanged in appearance. Right internal jugular port in place. Calcified granuloma in the mid left lung field again noted. There is no consolidation, pneumothorax, or evidence of edema. No significant effusion is appreciated. The osseous structures are unchanged in appearance. Unchanged appearance of the chest without acute airspace disease identified. CT CHEST ABDOMEN PELVIS W CONTRAST    Result Date: 2/2/2022  EXAMINATION: CT OF THE CHEST, ABDOMEN, AND PELVIS WITH CONTRAST 1/31/2022 2:27 pm TECHNIQUE: CT of the chest, abdomen and pelvis was performed with the administration of intravenous contrast. Multiplanar reformatted images are provided for review.  Dose modulation, iterative reconstruction, and/or weight based adjustment of the mA/kV was utilized to reduce the radiation dose to as low as reasonably achievable. COMPARISON: 10/20/2021 CT chest, abdomen and pelvis HISTORY: ORDERING SYSTEM PROVIDED HISTORY: Malignant neoplasm of lower third of esophagus [facility]) TECHNOLOGIST PROVIDED HISTORY: Follow up Reason for Exam: esophageal ca f/u FINDINGS: Chest: Mediastinum:  Thyroid is grossly unremarkable within the limits of CT. No evidence of mediastinal, hilar or axillary lymphadenopathy. Coronary and aortic atherosclerotic calcifications are noted. There is a small stable pericardial effusion. The central airways are clear. There is no evidence of esophageal obstruction. There is mild wall thickening and mucosal enhancement of the distal 3rd of the esophagus and gastroesophageal junction unchanged in appearance compared to prior examination. There are possible esophageal varices noted with prominence of the paraesophageal vessels. No evidence of paraesophageal lymphadenopathy. There is a right chest wall Port-A-Cath line with its tip at the cavoatrial junction. Lungs/pleura: There are small layering bilateral pleural effusions. There is bibasilar subsegmental and passive atelectasis. No evidence of new or enlarging pulmonary nodules. There is scattered calcified granulomata noted. No acute process. Soft Tissues/Bones:  Age related degenerative changes of the visualized osseous structures without focal destructive lesion. Abdomen/Pelvis: Organs: There is a nodular contour of the liver with enlargement of the caudate and left hepatic lobe suggesting hepatic cirrhosis. No evidence of focal hepatic mass. No biliary ductal dilatation. Portal vein is patent. There is cholelithiasis without CT evidence of acute cholecystitis. The pancreas is unremarkable in appearance. No focal adrenal nodules. The kidneys enhance symmetrically without evidence of hydronephrosis. There is splenomegaly measuring approximately 13.4 cm in sagittal dimension. No evidence of splenic mass.   There are scattered splenic calcified granulomata. Splenomegaly is likely secondary to portal venous hypertension. GI/Bowel: Mild wall thickening of the gastroesophageal junction is again noted. There is no evidence of bowel obstruction. No evidence of abnormal bowel wall thickening or distension. Pelvis: Bladder demonstrates no acute abnormality. Prostate demonstrates no acute abnormality. Peritoneum/Retroperitoneum: There is moderate abdominal and pelvic ascites likely related to cirrhosis. Stable calcified thrombosed 3.4 cm splenic artery aneurysm again noted. Aorta is normal in caliber. Mild atherosclerotic calcifications of the aorta and branch vessels. No evidence of free air. No discrete drainable fluid collection to suggest abscess. No evidence of lymphadenopathy. Bones/Soft Tissues:  Age-related degenerative changes of the visualized osseous structures without focal destructive lesion. Chronic mild T12 compression fracture. Persistent mild wall thickening of the distal 3rd of the esophagus and gastroesophageal junction consistent with history of esophageal malignancy. No evidence of esophageal obstruction. Persistent findings of prominent serpiginous vessels near the distal 3rd of the esophagus may represent sequela of known esophageal malignancy versus esophageal varices secondary to cirrhosis. No change in appearance compared to prior exam. Morphologic findings of hepatic cirrhosis and portal venous hypertension again demonstrated. Moderate abdominal ascites increased since prior examination likely secondary to cirrhosis. No evidence of lymphadenopathy or metastatic disease. Unchanged peripherally calcified thrombosed 3.4 cm splenic artery aneurysm. Assessment  1. Ascites due to alcoholic cirrhosis ([address]    2. Liver disease, chronic    3. Alcoholic cirrhosis of liver without ascites ([address]    4.  Malignant neoplasm of lower third of esophagus (HCC)    5. Lezama's esophagus with high grade dysplasia 6. Portal hypertensive gastropathy ([facility]    7. History of hepatitis C        Plan  Patient is stable. He continues to drink alcohol. He is noncompliant regarding diet. I had long discussion with the patient and the patient's wife regarding low-salt diet. Also discussed regarding abstinence from alcohol. Explained regarding consequences of continuous drinking. At present he has ascites but not tense. Advised to add Lasix 20 mg a day along with Aldactone 50 mg a day. Discussed importance of low-salt diet. Advised to have labs in the next 3 weeks. At that time basing on the renal status and potassium levels, will adjust diuretic therapy. Patient is advised nadolol 20 mg a day and after 2 to 3 weeks, basing on his blood pressure will increase the dose. After discussion they understood and agreed for follow-up. Thank you for allowing me to participate in the care of Mr. Beth Hull. For any further questions please do not hesitate to contact me. I have reviewed and agree with the ROS entered by the MA/LPN. Note is dictated utilizing voice recognition software. Unfortunately this leads to occasional typographical errors.  Please contact our office if you have any questions        Layla Katz MD,FACP, [facility]  Board Certified in Gastroenterology and [address] [place] [address] Gastroenterology  Office #: [phone]

## 2022-02-11 DIAGNOSIS — E55.9 VITAMIN D DEFICIENCY: ICD-10-CM

## 2022-02-11 RX ORDER — MELATONIN
Qty: 30 TABLET | Refills: 0 | Status: SHIPPED | OUTPATIENT
Start: 2022-02-11 | End: 2022-04-13

## 2022-03-01 ENCOUNTER — HOSPITAL ENCOUNTER (OUTPATIENT)
Age: 62
Discharge: HOME | End: 2022-03-01
Payer: MEDICARE

## 2022-03-01 DIAGNOSIS — K70.31 ASCITES DUE TO ALCOHOLIC CIRRHOSIS (HCC): ICD-10-CM

## 2022-03-01 LAB
ABSOLUTE EOS #: 0 K/UL (ref 0–0.4)
ABSOLUTE LYMPH #: 0.94 K/UL (ref 1–4.8)
ABSOLUTE MONO #: 0.55 K/UL (ref 0.1–1.3)
ALBUMIN SERPL-MCNC: 3.2 G/DL (ref 3.5–5.2)
ALP BLD-CCNC: 162 U/L (ref 40–129)
ALT SERPL-CCNC: 10 U/L (ref 5–41)
ANION GAP SERPL CALCULATED.3IONS-SCNC: 12 MMOL/L (ref 9–17)
AST SERPL-CCNC: 32 U/L
BASOPHILS # BLD: 0 % (ref 0–2)
BASOPHILS ABSOLUTE: 0 K/UL (ref 0–0.2)
BILIRUB SERPL-MCNC: 1.15 MG/DL (ref 0.3–1.2)
BUN BLDV-MCNC: 4 MG/DL (ref 8–23)
CALCIUM SERPL-MCNC: 8.2 MG/DL (ref 8.6–10.4)
CHLORIDE BLD-SCNC: 100 MMOL/L (ref 98–107)
CO2: 27 MMOL/L (ref 20–31)
CREAT SERPL-MCNC: 0.48 MG/DL (ref 0.7–1.2)
EOSINOPHILS RELATIVE PERCENT: 0 % (ref 0–4)
GFR AFRICAN AMERICAN: >60 ML/MIN
GFR NON-AFRICAN AMERICAN: >60 ML/MIN
GFR SERPL CREATININE-BSD FRML MDRD: ABNORMAL ML/MIN/{1.73_M2}
GLUCOSE BLD-MCNC: 135 MG/DL (ref 70–99)
HCT VFR BLD CALC: 25.6 % (ref 41–53)
HEMOGLOBIN: 7.9 G/DL (ref 13.5–17.5)
LIPASE: 52 U/L (ref 13–60)
LYMPHOCYTES # BLD: 17 % (ref 24–44)
MCH RBC QN AUTO: 25.1 PG (ref 26–34)
MCHC RBC AUTO-ENTMCNC: 30.9 G/DL (ref 31–37)
MCV RBC AUTO: 81.5 FL (ref 80–100)
MONOCYTES # BLD: 10 % (ref 1–7)
MORPHOLOGY: ABNORMAL
PDW BLD-RTO: 18 % (ref 11.5–14.9)
PLATELET # BLD: 219 K/UL (ref 150–450)
PMV BLD AUTO: 6.8 FL (ref 6–12)
POTASSIUM SERPL-SCNC: 3.3 MMOL/L (ref 3.7–5.3)
RBC # BLD: 3.14 M/UL (ref 4.5–5.9)
SEG NEUTROPHILS: 73 % (ref 36–66)
SEGMENTED NEUTROPHILS ABSOLUTE COUNT: 4.01 K/UL (ref 1.3–9.1)
SODIUM BLD-SCNC: 139 MMOL/L (ref 135–144)
TOTAL PROTEIN: 6 G/DL (ref 6.4–8.3)
WBC # BLD: 5.5 K/UL (ref 3.5–11)

## 2022-03-01 PROCEDURE — 36415 COLL VENOUS BLD VENIPUNCTURE: CPT

## 2022-03-01 PROCEDURE — 85025 COMPLETE CBC W/AUTO DIFF WBC: CPT

## 2022-03-01 PROCEDURE — 80053 COMPREHEN METABOLIC PANEL: CPT

## 2022-03-01 PROCEDURE — 83690 ASSAY OF LIPASE: CPT

## 2022-03-15 ENCOUNTER — HOSPITAL ENCOUNTER (OUTPATIENT)
Dept: INFUSION THERAPY | Age: 62
Discharge: HOME | End: 2022-03-15
Payer: MEDICARE

## 2022-03-15 DIAGNOSIS — C15.9 ESOPHAGEAL ADENOCARCINOMA (HCC): Primary | ICD-10-CM

## 2022-03-15 PROCEDURE — 6360000002 HC RX W HCPCS: Performed by: INTERNAL MEDICINE

## 2022-03-15 PROCEDURE — 96523 IRRIG DRUG DELIVERY DEVICE: CPT

## 2022-03-15 PROCEDURE — 2580000003 HC RX 258: Performed by: INTERNAL MEDICINE

## 2022-03-15 RX ORDER — SODIUM CHLORIDE 9 MG/ML
25 INJECTION, SOLUTION INTRAVENOUS PRN
Status: CANCELLED | OUTPATIENT
Start: 2022-03-15

## 2022-03-15 RX ORDER — HEPARIN SODIUM (PORCINE) LOCK FLUSH IV SOLN 100 UNIT/ML 100 UNIT/ML
500 SOLUTION INTRAVENOUS PRN
Status: CANCELLED | OUTPATIENT
Start: 2022-03-15

## 2022-03-15 RX ORDER — HEPARIN SODIUM (PORCINE) LOCK FLUSH IV SOLN 100 UNIT/ML 100 UNIT/ML
500 SOLUTION INTRAVENOUS PRN
Status: DISCONTINUED | OUTPATIENT
Start: 2022-03-15 | End: 2022-03-16 | Stop reason: HOSPADM

## 2022-03-15 RX ORDER — SODIUM CHLORIDE 0.9 % (FLUSH) 0.9 %
5-40 SYRINGE (ML) INJECTION PRN
Status: DISCONTINUED | OUTPATIENT
Start: 2022-03-15 | End: 2022-03-16 | Stop reason: HOSPADM

## 2022-03-15 RX ORDER — SODIUM CHLORIDE 0.9 % (FLUSH) 0.9 %
5-40 SYRINGE (ML) INJECTION PRN
Status: CANCELLED | OUTPATIENT
Start: 2022-03-15

## 2022-03-15 RX ADMIN — HEPARIN 500 UNITS: 100 SYRINGE at 15:39

## 2022-03-15 RX ADMIN — SODIUM CHLORIDE, PRESERVATIVE FREE 10 ML: 5 INJECTION INTRAVENOUS at 15:39

## 2022-03-22 ENCOUNTER — OFFICE VISIT (OUTPATIENT)
Dept: PAIN MANAGEMENT | Age: 62
End: 2022-03-22
Payer: MEDICARE

## 2022-03-22 VITALS
BODY MASS INDEX: 30.78 KG/M2 | WEIGHT: 215 LBS | HEART RATE: 98 BPM | HEIGHT: 70 IN | DIASTOLIC BLOOD PRESSURE: 73 MMHG | SYSTOLIC BLOOD PRESSURE: 123 MMHG | OXYGEN SATURATION: 100 %

## 2022-03-22 DIAGNOSIS — K70.30 ALCOHOLIC CIRRHOSIS, UNSPECIFIED WHETHER ASCITES PRESENT (HCC): ICD-10-CM

## 2022-03-22 DIAGNOSIS — M48.062 SPINAL STENOSIS OF LUMBAR REGION WITH NEUROGENIC CLAUDICATION: Primary | ICD-10-CM

## 2022-03-22 PROCEDURE — 99214 OFFICE O/P EST MOD 30 MIN: CPT | Performed by: ANESTHESIOLOGY

## 2022-03-22 ASSESSMENT — ENCOUNTER SYMPTOMS
ABDOMINAL PAIN: 1
SHORTNESS OF BREATH: 0
EYE PAIN: 0
COUGH: 0
EYE REDNESS: 0
SINUS PAIN: 0
DIARRHEA: 0
BACK PAIN: 1
SINUS PRESSURE: 0
NAUSEA: 0
CHEST TIGHTNESS: 0
CONSTIPATION: 0
SORE THROAT: 0

## 2022-03-22 NOTE — PROGRESS NOTES
The patient is a 57 y. o. Non- / non  male. Chief Complaint   Patient presents with    Back Pain    Follow-up        HPI  78-year-old man with history of alcoholic cirrhosis, chronic anemia, history of esophageal cancer    Today here for follow-up of her back pain  Back pain is chronic onset many years ago located in the lower lumbar area  Back pain is constant  Reports severe radiation down both legs with standing and walking  He said he could barely walk 60 feet  Pain relieved with sitting and rest  He is diagnosed with lumbar spinal stenosis  Had surgical evaluation couple of years ago and was not considered a surgical candidate considering severe comorbidities  Had epidural injection last injection was in December that provided 50% relief for more than 6 weeks  Pain is now back to the baseline    Last MRI was in 2021 that showed multilevel spinal stenosis and foraminal narrowing at L4 severe left-sided foraminal narrowing with moderate right side foraminal narrowing    Pain is severe sharp shooting and is affecting quality of life    Patient is here for a follow up in regards to back pain. Patient states he is at a 10/10 and is in significant pain. Patient states that lately he has had difficulty walking and difficulty maintaining balance.      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 Ashley Bynum, orthopedic, every 3-4 months, gets steroid injection    Lezama esophagus     BPH (benign prostatic hypertrophy)     Cholelithiasis     Cirrhosis ([address]     COVID-19 12/2020    pt reports he had a positive test while at [facility] in 2020, was asymptomatic    COVID-19 vaccine series completed 5/20/2021, 6/22/2021    Moderna 5/20/2021, 6/22/2021    DDD (degenerative disc disease), lumbar     Depression     Esophageal cancer (HCC)     INVASIVE ADENOCARCINOMA ARISING IN TUBULAR ADENOMA WITH HIGH GRADE DYSPLASIA, ASSOCIATED WITH FOCAL INTESTINAL METAPLASIA     Esophageal varices (HCC)     Fatty liver     GERD (gastroesophageal reflux disease)     Hep C w/o coma, chronic (HCC)     History of alcohol abuse     6-12 beers a day; quit drinking July 2016    History of blood transfusion     History of colon polyps 2016    History of tobacco abuse     [place] (hard of hearing)     Hyperlipidemia     Hypertension     Port-A-Cath in place     right upper chest    Stomach ulcer     hx of    Tubular adenoma of colon 2016, 2018    Vitamin D deficiency     Wears glasses         Past Surgical History:   Procedure Laterality Date    BUNIONECTOMY      twice on right side    BUNIONECTOMY Left     CARPAL TUNNEL RELEASE Right     COLONOSCOPY      at age 35    COLONOSCOPY  10/05/2016    polyps-pathology tubular adenoma, and abnormal looking mucosa right colon-pathology-tubular adenoma    COLONOSCOPY N/A 3/30/2018    COLONOSCOPY POLYPECTOMY COLD BIOPSY performed by General Sujey MD at [address] [place] [address]  03/30/2018    Small polyp in the sigmoid colon and excised with biopsy forceps--tubular adenoma    ENDOSCOPY, COLON, DIAGNOSTIC      EGD    IR PORT PLACEMENT EQUAL OR GREATER THAN 5 YEARS  4/19/2021    IR PORT PLACEMENT EQUAL OR GREATER THAN 5 YEARS 4/19/2021 [facility] PROCED[facility]    KNEE SURGERY Left     cyst removed    NASAL SEPTUM SURGERY      NERVE BLOCK Right 11/23/2020    NERVE BLOCK RIGHT CERVICAL STEROID INJECTION  C3-C6 performed by Niels Coe MD at [place] [address]  01/04/16    steroid injection C7 T1    OTHER SURGICAL HISTORY  11/21/2016    Bilateral Lumbar CACHORRO L4-L5 injections    OTHER SURGICAL HISTORY  12/19/2016    lumbar steroid injection    OTHER SURGICAL HISTORY  09/28/2018    BILATERAL L5 CACHORRO (N/A Back)    OTHER SURGICAL HISTORY Right 11/23/2020    cervical injection    PAIN MANAGEMENT PROCEDURE Left 7/9/2020    EPIDURAL STEROID INJECTION LEFT L4 L5 performed by Uyen Qiu MD at [address] [place] [address] Left 7/20/2020    LEFT L4 L5 EPIDURAL STEROID INJECTION performed by Uyen Qiu MD at [address] [place] [address] Bilateral 8/17/2020    LUMBAR FACET BILATERAL L2-L5 performed by Uyen Qiu MD at [address] [place] [address] Bilateral 12/7/2020    NERVE BLOCK BILATERAL LUMBAR MEDIAL BRANCH L2-L5 performed by Uyen Qiu MD at [address] AA&/STRD TFRML EPI LUMBAR/SACRAL 1 LEVEL Bilateral 9/6/2018    BILATERAL L5 CACHORRO performed by Uyen Qiu MD at [address] AA&/STRD TFRML EPI LUMBAR/SACRAL 1 LEVEL N/A 9/28/2018    BILATERAL L5 CACHORRO performed by Uyen Qiu MD at [address] [facility] N/CARPAL TUNNEL SURG Right 8/29/2017    CARPAL TUNNEL RELEASE RIGHT performed by Lynda Silva MD at [address] [place] [address]/CARPAL TUNNEL SURG Left 10/31/2017    CARPAL TUNNEL RELEASE performed by Lynda Silva MD at [place] [address] 12/29/2020    EGD BIOPSY performed by Jose Yu MD at [address] [place] [address] N/A 2/2/2021    EGD BIOPSY and spot marking performed by Justus Faulkner MD at [address] [place] [address] 2/12/2021    ENDOSCOPIC ULTRASOUND, EGD performed by Dick Winchester MD at [address] [place] [address]  2/12/2021    EGD DIAGNOSTIC ONLY performed by Dick Winchester MD at [address] [place] [address] N/A 8/31/2021    EGD BIOPSY performed by Justus Faulkner MD at [address] [place] [address] N/A 1/21/2022    EGD BIOPSY performed by Justus Faulkner MD at [facility] History     Socioeconomic History    Marital status: Single     Spouse name: None    Number of children: None    Years of education: None    Highest education level: None   Occupational History    None   Tobacco Use    Smoking status: Former Smoker     Packs/day: 1.00     Years: 45.00     Pack years: 45.00     Quit date: 2017     Years since quittin.1    Smokeless tobacco: Never Used   Vaping Use    Vaping Use: Never used   Substance and Sexual Activity    Alcohol use: Not Currently     Comment: quit     Drug use: Not Currently     Frequency: 1.0 times per week     Comment: cocaine,  stopped spring 2016    Sexual activity: Yes     Partners: Female   Other Topics Concern    None   Social History Narrative     in the past, retired     Social Determinants of Health     Financial Resource Strain:     Difficulty of Paying Living Expenses: Not on file   Food Insecurity:     Worried About [address] [employer] in the Last Year: Not on file    [address] [employer] in the Last Year: Not on file   Transportation Needs:     Lack of Transportation (Medical): Not on file    Lack of Transportation (Non-Medical):  Not on file   Physical Activity:     Days of Exercise per Week: Not on file    Minutes of Exercise per Session: Not on file   Stress:     Feeling of Stress : Not on file   Social Connections:     Frequency of Communication with Friends and Family: Not on file    Frequency of Social Gatherings with Friends and Family: Not on file    Attends [employer]ism Services: Not on file    Active Member of [address] [place] [address] or Organizations: Not on file    Attends Club or Organization Meetings: Not on file    Marital Status: Not on file   Intimate Partner Violence:     Fear of Current or Ex-Partner: Not on file    Emotionally Abused: Not on file    Physically Abused: Not on file    Sexually Abused: Not on file   Housing Stability:     Unable to Pay for Housing in the Last Year: Not on file    Number of [place] in the Last Year: Not on file    Unstable Housing in the Last Year: Not on file       Family History   Problem Relation Age of Onset    Cancer Mother         pancreatic    Cancer Father         bone    Diabetes Sister     Asthma Brother        Allergies   Allergen Reactions    Bee Pollen Other (See Comments)     Runny nose, watery eyes    Pollen Extract      Runny nose, watery eyes       Vitals:    03/22/22 1358   BP: 123/73   Pulse: 98   SpO2: 100%       Current Outpatient Medications   Medication Sig Dispense Refill    vitamin D3 (CHOLECALCIFEROL) 25 MCG (1000 UT) TABS tablet take 1 tablet by mouth once daily 30 tablet 0    nadolol (CORGARD) 20 MG tablet Take 1 tablet by mouth daily 30 tablet 3    furosemide (LASIX) 20 MG tablet Take 1 tablet by mouth daily 60 tablet 0    ferrous sulfate (IRON 325) 325 (65 Fe) MG tablet Take 1 tablet by mouth 2 times daily 60 tablet 5    omeprazole (PRILOSEC) 40 MG delayed release capsule take 1 capsule by mouth EVERY MORNING BEFORE BREAKFAST 30 capsule 2    FLUoxetine (PROZAC) 20 MG capsule take 1 capsule by mouth once daily 30 capsule 3    lidocaine-prilocaine (EMLA) 2.5-2.5 % cream Apply topically 45-60 mins prior to needle poke daily PRN 1 Tube 2    hydrOXYzine (VISTARIL) 25 MG capsule take 1 capsule by mouth three times a day if needed for anxiety 90 capsule 2    atorvastatin (LIPITOR) 20 MG tablet take 1 tablet by mouth at bedtime 90 tablet 1    spironolactone (ALDACTONE) 50 MG tablet take 1 tablet by mouth once daily 90 tablet 3     No current facility-administered medications for this visit. Review of Systems   Constitutional: Negative for activity change, appetite change, chills and fever. HENT: Negative for congestion, sinus pressure, sinus pain, sneezing and sore throat. Eyes: Negative for pain and redness. Respiratory: Negative for cough, chest tightness and shortness of breath. Cardiovascular: Negative for chest pain and leg swelling. Gastrointestinal: Positive for abdominal pain. Negative for constipation, diarrhea and nausea. Endocrine: Negative for cold intolerance and heat intolerance.    Genitourinary: Negative for difficulty urinating. Musculoskeletal: Positive for back pain. Negative for neck pain and neck stiffness. Allergic/Immunologic: Negative for food allergies. Neurological: Positive for weakness. Negative for dizziness, tremors, light-headedness and headaches. Hematological: Bruises/bleeds easily. Psychiatric/Behavioral: Negative for agitation, confusion and suicidal ideas. Objective:  General Appearance: In pain, uncomfortable, in no acute distress and ill-appearing. Vital signs: (most recent): Blood pressure 123/73, pulse 98, height 5' 10\" (1.778 m), weight 215 lb (97.5 kg), SpO2 100 %. Vital signs are normal.  No fever. Output: Producing urine and producing stool. HEENT: Normal HEENT exam.    Lungs:  Normal effort and normal respiratory rate. He is not in respiratory distress. Heart: Normal rate. Extremities: Normal range of motion. There is no deformity. Neurological: Patient is alert and oriented to person, place and time. Patient has normal coordination. Pupils:  Pupils are equal, round, and reactive to light. Pupils are equal.   Skin:  Warm and dry. No rash or cyanosis.      Assessment & Plan   76-year-old man with history of alcoholic cirrhosis, chronic anemia, history of esophageal cancer    Today here for follow-up of her back pain  Back pain is chronic onset many years ago located in the lower lumbar area  Back pain is constant  Reports severe radiation down both legs with standing and walking  He said he could barely walk 60 feet  Pain relieved with sitting and rest  He is diagnosed with lumbar spinal stenosis  Had surgical evaluation couple of years ago and was not considered a surgical candidate considering severe comorbidities  Had epidural injection last injection was in December that provided 50% relief for more than 6 weeks  Pain is now back to the baseline    Last MRI was in 2021 that showed multilevel spinal stenosis and foraminal narrowing at L4 severe left-sided foraminal narrowing with moderate right side foraminal narrowing  EXAMINATION: MRI OF THE LUMBAR SPINE WITHOUT CONTRAST, 12/7/2021 7:35 am    Impression 1. Multilevel lumbar spondylosis, most notable at L4-L5 (moderate right and severe left foraminal narrowing). Overall interval progression compared to 2016. 2. Chronic T12 wedge compression fracture deformity. Present in 2016. Pain is severe sharp shooting and is affecting quality of life    1. Spinal stenosis of lumbar region with neurogenic claudication    2. Alcoholic cirrhosis, unspecified whether ascites present [facility])        Patient had good but short-term relief with previous epidural injection  We will schedule for a repeat epidural injection    Will send for a second surgical opinion  He is definitely a high risk patient for any major spine surgery    If no surgery is advised and epidural injection do not help then minimally invasive lumbar spine spacer for treatment of spinal stenosis could potentially be helpful    Information material provided    Orders Placed This Encounter   Procedures   [address] Jeanine Brian DO, Neurosurgery, [facility] Lacy Ch DX/THER SBST INTRLMNR LMBR/SAC W/IMG GDN      No orders of the defined types were placed in this encounter.            Electronically signed by Preet Umana MD on 3/22/2022 at 2:49 PM

## 2022-03-23 ENCOUNTER — OFFICE VISIT (OUTPATIENT)
Dept: GASTROENTEROLOGY | Age: 62
End: 2022-03-23
Payer: MEDICARE

## 2022-03-23 VITALS
SYSTOLIC BLOOD PRESSURE: 107 MMHG | BODY MASS INDEX: 30.85 KG/M2 | DIASTOLIC BLOOD PRESSURE: 64 MMHG | OXYGEN SATURATION: 100 % | WEIGHT: 215 LBS | HEART RATE: 117 BPM | TEMPERATURE: 97.7 F

## 2022-03-23 DIAGNOSIS — K70.31 ASCITES DUE TO ALCOHOLIC CIRRHOSIS (HCC): Primary | ICD-10-CM

## 2022-03-23 PROCEDURE — 99214 OFFICE O/P EST MOD 30 MIN: CPT | Performed by: INTERNAL MEDICINE

## 2022-03-23 ASSESSMENT — ENCOUNTER SYMPTOMS
BACK PAIN: 0
VOICE CHANGE: 0
VOMITING: 0
CONSTIPATION: 0
COUGH: 0
TROUBLE SWALLOWING: 0
RECTAL PAIN: 0
ALLERGIC/IMMUNOLOGIC NEGATIVE: 1
SORE THROAT: 0
DIARRHEA: 0
RESPIRATORY NEGATIVE: 1
ABDOMINAL PAIN: 1
ANAL BLEEDING: 0
NAUSEA: 0
SHORTNESS OF BREATH: 0
CHOKING: 0
BLOOD IN STOOL: 0
ABDOMINAL DISTENTION: 1

## 2022-03-24 ENCOUNTER — TELEPHONE (OUTPATIENT)
Dept: PAIN MANAGEMENT | Age: 62
End: 2022-03-24

## 2022-03-24 ENCOUNTER — TELEPHONE (OUTPATIENT)
Dept: INTERVENTIONAL RADIOLOGY/VASCULAR | Age: 62
End: 2022-03-24

## 2022-03-28 ENCOUNTER — TELEPHONE (OUTPATIENT)
Dept: PAIN MANAGEMENT | Age: 62
End: 2022-03-28

## 2022-03-28 NOTE — TELEPHONE ENCOUNTER
Patient is requesting to schedule a back injection at earliest convenience. Please contact Nilam to discuss. Phone number on file has been verified. Thank you.

## 2022-04-04 ENCOUNTER — TELEPHONE (OUTPATIENT)
Dept: PAIN MANAGEMENT | Age: 62
End: 2022-04-04

## 2022-04-04 RX ORDER — SODIUM CHLORIDE 9 MG/ML
INJECTION, SOLUTION INTRAVENOUS CONTINUOUS
Status: CANCELLED | OUTPATIENT
Start: 2022-04-04

## 2022-04-04 RX ORDER — FUROSEMIDE 20 MG/1
TABLET ORAL
Qty: 60 TABLET | Refills: 0 | Status: ON HOLD | OUTPATIENT
Start: 2022-04-04 | End: 2022-04-21 | Stop reason: HOSPADM

## 2022-04-05 ENCOUNTER — HOSPITAL ENCOUNTER (OUTPATIENT)
Dept: INTERVENTIONAL RADIOLOGY/VASCULAR | Age: 62
Discharge: HOME | End: 2022-04-07
Payer: MEDICARE

## 2022-04-05 VITALS
HEIGHT: 70 IN | SYSTOLIC BLOOD PRESSURE: 110 MMHG | OXYGEN SATURATION: 100 % | TEMPERATURE: 97.7 F | HEART RATE: 106 BPM | RESPIRATION RATE: 18 BRPM | DIASTOLIC BLOOD PRESSURE: 72 MMHG | BODY MASS INDEX: 30.85 KG/M2

## 2022-04-05 DIAGNOSIS — K70.31 ASCITES DUE TO ALCOHOLIC CIRRHOSIS (HCC): ICD-10-CM

## 2022-04-05 LAB
ALBUMIN FLUID: 0.5 G/DL
APPEARANCE FLUID: NORMAL
COLOR FLUID: YELLOW
INR BLD: 1.3
PLATELET # BLD: 217 K/UL (ref 150–450)
PROTHROMBIN TIME: 15.9 SEC (ref 11.8–14.6)
RBC FLUID: 188 /MM3
SPECIMEN TYPE: NORMAL
TOTAL PROTEIN, BODY FLUID: <1 G/DL
WBC FLUID: 72 /MM3

## 2022-04-05 PROCEDURE — P9047 ALBUMIN (HUMAN), 25%, 50ML: HCPCS | Performed by: RADIOLOGY

## 2022-04-05 PROCEDURE — 7100000010 HC PHASE II RECOVERY - FIRST 15 MIN

## 2022-04-05 PROCEDURE — 84157 ASSAY OF PROTEIN OTHER: CPT

## 2022-04-05 PROCEDURE — 49083 ABD PARACENTESIS W/IMAGING: CPT

## 2022-04-05 PROCEDURE — 2580000003 HC RX 258: Performed by: RADIOLOGY

## 2022-04-05 PROCEDURE — 6360000002 HC RX W HCPCS: Performed by: RADIOLOGY

## 2022-04-05 PROCEDURE — 36415 COLL VENOUS BLD VENIPUNCTURE: CPT

## 2022-04-05 PROCEDURE — 2709999900 IR US GUIDED PARACENTESIS

## 2022-04-05 PROCEDURE — 7100000011 HC PHASE II RECOVERY - ADDTL 15 MIN

## 2022-04-05 PROCEDURE — 85610 PROTHROMBIN TIME: CPT

## 2022-04-05 PROCEDURE — 82042 OTHER SOURCE ALBUMIN QUAN EA: CPT

## 2022-04-05 PROCEDURE — 85049 AUTOMATED PLATELET COUNT: CPT

## 2022-04-05 RX ORDER — ALBUMIN (HUMAN) 12.5 G/50ML
50 SOLUTION INTRAVENOUS ONCE
Status: COMPLETED | OUTPATIENT
Start: 2022-04-05 | End: 2022-04-05

## 2022-04-05 RX ORDER — SODIUM CHLORIDE 9 MG/ML
INJECTION, SOLUTION INTRAVENOUS CONTINUOUS
Status: DISCONTINUED | OUTPATIENT
Start: 2022-04-05 | End: 2022-04-08 | Stop reason: HOSPADM

## 2022-04-05 RX ADMIN — ALBUMIN (HUMAN) 50 G: 0.25 INJECTION, SOLUTION INTRAVENOUS at 11:36

## 2022-04-05 RX ADMIN — SODIUM CHLORIDE: 9 INJECTION, SOLUTION INTRAVENOUS at 10:55

## 2022-04-05 ASSESSMENT — ENCOUNTER SYMPTOMS
SORE THROAT: 0
APNEA: 0
SINUS PRESSURE: 0
WHEEZING: 0
COUGH: 0
BACK PAIN: 1
CHEST TIGHTNESS: 0
RHINORRHEA: 0
SINUS PAIN: 0
TROUBLE SWALLOWING: 0
SHORTNESS OF BREATH: 1

## 2022-04-05 ASSESSMENT — PAIN - FUNCTIONAL ASSESSMENT
PAIN_FUNCTIONAL_ASSESSMENT: 0-10

## 2022-04-05 NOTE — H&P
HISTORY and [facility] [id number]       NAME:  Nena Dos Santos  MRN: [id number]   YOB: 1960   Date: 4/5/2022   Age: 57 y.o. Gender: male       COMPLAINT AND PRESENT HISTORY:   Nena Dos Santos  is a 57 y.o. male presenting today an IR paracentesis as r/t cirrhosis. Patient has history of Alcohol Cirrhosis of Liver with ascites. , Chronic Liver disease, Portal Hypertension and hx of Hepatitis C.   Pt states he has been sober for the last month.  Is not following salt restricted diet.  He has been on Aldactone 50 mg a day  Patient bowel movements normal color. Patient has hx of abdominal pain, distension, diarrhea and nausea. Pt states he did have one paracentesis several years. He states over the past year he has noticed his stomach has been increasing with fluid retention. He denies any pain currently. Pt reports SOB with exertion, denies o2 at home, is using nc with o2 here.    reports getting dizzy with sudden positon change, denies any recent falls. Pt has a PMHX significant for esophageal cancer, denies any dysphagia. Does have a right chest port, last chemo over a year ago. Water, no meds taken. Pt does not wear dentures. Pt denies any hx of MRSA infection  Pt not currently taking any blood thinners or anticoagulants  Pt denies any personal or FHx of complications with anesthesia. Pt denies any acute symptoms of illness at this time including no SOB, CP, fever, URI or UTI symptoms. RECENT IMAGING    No results found.      PAST MEDICAL HISTORY     Past Medical History:   Diagnosis Date    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 ([facility] [place] [address]     Anxiety     Arthritis     Back pain, chronic     dr. Almond Siemens, orthopedic, every 3-4 months, gets steroid injection    Lezama esophagus     BPH (benign prostatic hypertrophy)     Cholelithiasis     Cirrhosis ([facility] [place] [address]     COVID-19 12/2020    pt reports he had a positive test [facility], was asymptomatic    COVID-19 vaccine series completed 5/20/2021, 6/22/2021    Moderna 5/20/2021, 6/22/2021    DDD (degenerative disc disease), lumbar     Depression     Esophageal cancer ([facility] [place] [address]     INVASIVE ADENOCARCINOMA ARISING IN TUBULAR ADENOMA WITH HIGH GRADE DYSPLASIA, ASSOCIATED WITH FOCAL INTESTINAL METAPLASIA     Esophageal varices ([facility] [address]     Fatty liver     GERD (gastroesophageal reflux disease)     Hep C w/o coma, chronic ([facility] [address]     History of alcohol abuse     6-12 beers a day; quit drinking July 2016    History of blood transfusion     History of colon polyps 2016    History of tobacco abuse     [place] (hard of hearing)     Hyperlipidemia     Hypertension     Port-A-Cath in place     right upper chest    Stomach ulcer     hx of    Tubular adenoma of colon 2016, 2018    Vitamin D deficiency     Wears glasses        SURGICAL HISTORY       Past Surgical History:   Procedure Laterality Date    BUNIONECTOMY      twice on right side    BUNIONECTOMY Left     CARPAL TUNNEL RELEASE Right     COLONOSCOPY      at age 35    COLONOSCOPY  10/05/2016    polyps-pathology tubular adenoma, and abnormal looking mucosa right colon-pathology-tubular adenoma    COLONOSCOPY N/A 3/30/2018    COLONOSCOPY POLYPECTOMY COLD BIOPSY performed by Tabatha Albarran MD at [address] [place] [address]  03/30/2018    Small polyp in the sigmoid colon and excised with biopsy forceps--tubular adenoma    ENDOSCOPY, COLON, DIAGNOSTIC      EGD    IR PORT PLACEMENT EQUAL OR GREATER THAN 5 YEARS  4/19/2021    IR PORT PLACEMENT EQUAL OR GREATER THAN 5 YEARS 4/19/2021 [facility] PROCED[facility]    KNEE SURGERY Left     cyst removed    NASAL SEPTUM SURGERY      NERVE BLOCK Right 11/23/2020    NERVE BLOCK RIGHT CERVICAL STEROID INJECTION  C3-C6 performed by Antonietta Fulton MD at [address]  01/04/16    steroid injection C7 T1    OTHER SURGICAL HISTORY  11/21/2016    Bilateral Lumbar CACHORRO L4-L5 injections    OTHER SURGICAL HISTORY 12/19/2016    lumbar steroid injection    OTHER SURGICAL HISTORY  09/28/2018    BILATERAL L5 CACHORRO (N/A Back)    OTHER SURGICAL HISTORY Right 11/23/2020    cervical injection    PAIN MANAGEMENT PROCEDURE Left 7/9/2020    EPIDURAL STEROID INJECTION LEFT L4 L5 performed by Ariel Horowitz MD at [address] [place] [address] Left 7/20/2020    LEFT L4 L5 EPIDURAL STEROID INJECTION performed by Ariel Horowitz MD at [address] [place] [address] Bilateral 8/17/2020    LUMBAR FACET BILATERAL L2-L5 performed by Ariel Horowitz MD at [address] [place] [address] Bilateral 12/7/2020    NERVE BLOCK BILATERAL LUMBAR MEDIAL BRANCH L2-L5 performed by Ariel Horowitz MD at [address] AA&/STRD TFRML EPI LUMBAR/SACRAL 1 LEVEL Bilateral 9/6/2018    BILATERAL L5 CACHORRO performed by Ariel Horowitz MD at [address] AA&/STRD TFRML EPI LUMBAR/SACRAL 1 LEVEL N/A 9/28/2018    BILATERAL L5 CACHORRO performed by Ariel Horowitz MD at [address]/CARPAL TUNNEL SURG Right 8/29/2017    CARPAL TUNNEL RELEASE RIGHT performed by Franc Alexis MD at [address]/CARPAL TUNNEL SURG Left 10/31/2017    CARPAL TUNNEL RELEASE performed by Franc Alexis MD at [address] [place] [address] 12/29/2020    EGD BIOPSY performed by Jayce Drake MD at [place] [address] N/A 2/2/2021    EGD BIOPSY and spot marking performed by Rene Benson MD at [place] [address] N/A 2/12/2021    ENDOSCOPIC ULTRASOUND, EGD performed by Loretta Tapia MD at [address] [place] [address]  2/12/2021    EGD DIAGNOSTIC ONLY performed by Loretta Tapia MD at [address] [place] [address] N/A 8/31/2021    EGD BIOPSY performed by Rene Benson MD at [place] [address] 1/21/2022    EGD BIOPSY performed by Rene Benson MD at [address] [place] [address] WISDOM TOOTH EXTRACTION         FAMILY HISTORY       Family History   Problem Relation Age of Onset    Cancer Mother         pancreatic    Cancer Father         bone    Diabetes Sister     Asthma Brother        SOCIAL HISTORY       Social History     Socioeconomic History    Marital status: Single     Spouse name: Not on file    Number of children: Not on file    Years of education: Not on file    Highest education level: Not on file   Occupational History    Not on file   Tobacco Use    Smoking status: Former Smoker     Packs/day: 1.00     Years: 45.00     Pack years: 45.00     Quit date: 2017     Years since quittin.2    Smokeless tobacco: Never Used   Vaping Use    Vaping Use: Never used   Substance and Sexual Activity    Alcohol use: Not Currently     Comment: quit     Drug use: Not Currently     Frequency: 1.0 times per week     Comment: cocaine,  stopped spring 2016    Sexual activity: Yes     Partners: Female   Other Topics Concern    Not on file   Social History Narrative     in the past, retired     Social Determinants of Health     Financial Resource Strain:     Difficulty of Paying Living Expenses: Not on file   Food Insecurity:     Worried About [address] in the Last Year: Not on file    [address] in the Last Year: Not on file   Transportation Needs:     Lack of Transportation (Medical): Not on file    Lack of Transportation (Non-Medical):  Not on file   Physical Activity:     Days of Exercise per Week: Not on file    Minutes of Exercise per Session: Not on file   Stress:     Feeling of Stress : Not on file   Social Connections:     Frequency of Communication with Friends and Family: Not on file    Frequency of Social Gatherings with Friends and Family: Not on file    Attends [employer] Services: Not on file    Active Member of Clubs or Organizations: Not on file    Attends Club or Organization Meetings: Not on file    Marital Status: Not on file   Intimate Partner Violence:     Fear of Current or Ex-Partner: Not on file    Emotionally Abused: Not on file    Physically Abused: Not on file    Sexually Abused: Not on file   Housing Stability:     Unable to Pay for Housing in the Last Year: Not on file    Number of [place] in the Last Year: Not on file    Unstable Housing in the Last Year: Not on file           REVIEW OF SYSTEMS      Allergies   Allergen Reactions    Bee Pollen Other (See Comments)     Runny nose, watery eyes    Pollen Extract      Runny nose, watery eyes       Current Outpatient Medications on File Prior to Encounter   Medication Sig Dispense Refill    furosemide (LASIX) 20 MG tablet take 1 tablet by mouth once daily 60 tablet 0    vitamin D3 (CHOLECALCIFEROL) 25 MCG (1000 UT) TABS tablet take 1 tablet by mouth once daily 30 tablet 0    nadolol (CORGARD) 20 MG tablet Take 1 tablet by mouth daily 30 tablet 3    ferrous sulfate (IRON 325) 325 (65 Fe) MG tablet Take 1 tablet by mouth 2 times daily 60 tablet 5    omeprazole (PRILOSEC) 40 MG delayed release capsule take 1 capsule by mouth EVERY MORNING BEFORE BREAKFAST 30 capsule 2    FLUoxetine (PROZAC) 20 MG capsule take 1 capsule by mouth once daily 30 capsule 3    lidocaine-prilocaine (EMLA) 2.5-2.5 % cream Apply topically 45-60 mins prior to needle poke daily PRN 1 Tube 2    hydrOXYzine (VISTARIL) 25 MG capsule take 1 capsule by mouth three times a day if needed for anxiety 90 capsule 2    atorvastatin (LIPITOR) 20 MG tablet take 1 tablet by mouth at bedtime 90 tablet 1    spironolactone (ALDACTONE) 50 MG tablet take 1 tablet by mouth once daily 90 tablet 3     No current facility-administered medications on file prior to encounter. Review of Systems   Constitutional: Negative for chills, diaphoresis, fatigue and fever.    HENT: Negative for congestion, dental problem, ear pain, postnasal drip, rhinorrhea, sinus pressure, sinus pain, sore throat and trouble swallowing. Eyes: Positive for visual disturbance. Respiratory: Positive for shortness of breath. Negative for apnea, cough, chest tightness and wheezing. Cardiovascular: Positive for leg swelling. Negative for chest pain and palpitations. Gastrointestinal:        See hpi    Genitourinary: Negative for dysuria, flank pain, frequency and hematuria. Musculoskeletal: Positive for back pain. Negative for joint swelling and myalgias. Skin: Negative for rash and wound. Neurological: Positive for dizziness. Negative for weakness, numbness and headaches. Hematological: Does not bruise/bleed easily. Psychiatric/Behavioral: Negative for agitation and confusion. The patient is not nervous/anxious. See HPI    GENERAL PHYSICAL EXAM:     Vitals: BP (!) 144/68   Pulse 114   Temp 98.2 °F (36.8 °C) (Infrared)   Resp 18   Ht 5' 10\" (1.778 m)   SpO2 100%   BMI 30.85 kg/m²  Body mass index is 30.85 kg/m². Physical Exam  Constitutional:       General: He is not in acute distress. Appearance: Normal appearance. He is well-developed. He is obese. He is not ill-appearing or toxic-appearing. HENT:      Head: Normocephalic and atraumatic. Mouth/Throat:      Mouth: Mucous membranes are dry. Pharynx: Oropharynx is clear. No oropharyngeal exudate or posterior oropharyngeal erythema. Eyes:      Extraocular Movements: Extraocular movements intact. Conjunctiva/sclera: Conjunctivae normal.      Pupils: Pupils are equal, round, and reactive to light. Comments: +glasses    Cardiovascular:      Rate and Rhythm: Normal rate. Rhythm irregular. Pulses: Normal pulses. Heart sounds: Normal heart sounds. No murmur heard. No friction rub. No gallop. Comments: Several skipped beats. Pulmonary:      Effort: Pulmonary effort is normal.      Breath sounds: Normal breath sounds. No wheezing.       Comments: SOB at rest, on O2 via NC  Abdominal:      General: Bowel sounds are normal. There is distension. Palpations: Abdomen is soft. Tenderness: There is no abdominal tenderness. There is no guarding or rebound. Comments: Distended, positive fluid shift. Musculoskeletal:         General: No swelling. Normal range of motion. Cervical back: Normal range of motion and neck supple. No rigidity or tenderness. Right lower leg: Edema present. Left lower leg: Edema present. Comments: +2 bilateral    Skin:     General: Skin is warm and dry. Coloration: Skin is jaundiced. Findings: No erythema. Neurological:      General: No focal deficit present. Mental Status: He is alert and oriented to person, place, and time. Mental status is at baseline. Sensory: No sensory deficit. Psychiatric:         Mood and Affect: Mood normal.         Behavior: Behavior normal.         Thought Content:  Thought content normal.         Judgment: Judgment normal.                                                                                         PROVISIONAL DIAGNOSES / SURGERY:      Paracentesis     Patient Active Problem List    Diagnosis Date Noted    Esophageal adenocarcinoma [facility] [address]     Low hemoglobin 12/20/2021    Symptomatic anemia, microcytic, acute 12/20/2021    Hypotension 12/20/2021    Former smoker, 50+ pack years, quit 2016 12/20/2021    HLD (hyperlipidemia) 12/20/2021    Abnormal findings on diagnostic imaging of spine 12/14/2021    Cervical spinal stenosis 12/14/2021    Spinal stenosis of lumbar region with neurogenic claudication 12/14/2021    Severe comorbid illness 11/30/2021    Gait instability 11/30/2021    Current smoker 04/05/2021    COVID-19 02/23/2021    Anxiety 02/23/2021    Malignant neoplasm of lower third of esophagus ([facility] [address]     Hypocalcemia 12/26/2020    Hypophosphatemia 12/26/2020    Gastrointestinal hemorrhage with melena     Alcohol abuse     Altered mental status     Acute gastrointestinal bleeding

## 2022-04-05 NOTE — PROGRESS NOTES
Alert and oriented. US in use. Rt abd prepped draped. Numbed and accessed by Dr Robert Monday 7.25 L of clear yellow fluid. Access removed, dressing applied.  Tolerated well Report called to [place]

## 2022-04-05 NOTE — BRIEF OP NOTE
Brief Postoperative Note    Orestes Kimball  YOB: 1960  [id number]    Pre-operative Diagnosis: Recurrent ascites; abdominal discomfort    Post-operative Diagnosis: Same    Procedure: US guided paracentesis     Anesthesia: Local    Surgeons/Assistants: Gregg    Estimated Blood Loss: less than 50     Complications: None    Fluid drained: non turbid yellow    Electronically signed by Ev Bean MD on 4/5/2022 at 11:41 AM

## 2022-04-06 ENCOUNTER — OFFICE VISIT (OUTPATIENT)
Dept: PAIN MANAGEMENT | Age: 62
End: 2022-04-06
Payer: MEDICARE

## 2022-04-06 VITALS
DIASTOLIC BLOOD PRESSURE: 58 MMHG | SYSTOLIC BLOOD PRESSURE: 87 MMHG | HEART RATE: 110 BPM | HEIGHT: 70 IN | BODY MASS INDEX: 28.35 KG/M2 | OXYGEN SATURATION: 89 % | WEIGHT: 198 LBS

## 2022-04-06 DIAGNOSIS — M54.51 VERTEBROGENIC LOW BACK PAIN: ICD-10-CM

## 2022-04-06 DIAGNOSIS — Z79.891 CHRONIC USE OF OPIATE FOR THERAPEUTIC PURPOSE: ICD-10-CM

## 2022-04-06 DIAGNOSIS — M48.062 SPINAL STENOSIS OF LUMBAR REGION WITH NEUROGENIC CLAUDICATION: Primary | ICD-10-CM

## 2022-04-06 DIAGNOSIS — M48.02 CERVICAL SPINAL STENOSIS: ICD-10-CM

## 2022-04-06 DIAGNOSIS — M47.816 LUMBAR FACET ARTHROPATHY: ICD-10-CM

## 2022-04-06 LAB
BASO FLUID: ABNORMAL %
EOSINOPHIL FLUID: 1 %
FLUID DIFF COMMENT: ABNORMAL
LYMPHOCYTES, BODY FLUID: 15 %
MONOCYTE, FLUID: ABNORMAL %
NEUTROPHIL, FLUID: 38 %
OTHER CELLS FLUID: 46 %

## 2022-04-06 PROCEDURE — 99213 OFFICE O/P EST LOW 20 MIN: CPT | Performed by: NURSE PRACTITIONER

## 2022-04-06 RX ORDER — OXYCODONE HYDROCHLORIDE AND ACETAMINOPHEN 5; 325 MG/1; MG/1
1 TABLET ORAL DAILY PRN
Qty: 30 TABLET | Refills: 0 | Status: ON HOLD | OUTPATIENT
Start: 2022-04-06 | End: 2022-04-21 | Stop reason: HOSPADM

## 2022-04-06 ASSESSMENT — ENCOUNTER SYMPTOMS
SORE THROAT: 0
WHEEZING: 0
COUGH: 0
DOUBLE VISION: 0
SHORTNESS OF BREATH: 1
BACK PAIN: 1
BLURRED VISION: 0
CONSTIPATION: 0
EYE PAIN: 0
BLOATING: 0
ABDOMINAL PAIN: 0

## 2022-04-06 NOTE — PROGRESS NOTES
Chief Complaint   Patient presents with    Back Pain    Medication Refill         PMH     74-year-old man with history of alcoholic cirrhosis, chronic anemia, history of esophageal cancer. Paracentesis with 7.5L removed yesterday       back pain  chronic onset many years ago located in the lower lumbar area  Back pain is constant with severe radiation down both legs with standing and walking - pt reports at this time extreme difficulty with standing and had fall yesterday when legs gave out. Last MRI was in 2021 that showed multilevel spinal stenosis and foraminal narrowing at L4 severe left-sided foraminal narrowing with moderate right side foraminal narrowing  Had surgical evaluation couple of years ago and was not considered a surgical candidate considering severe comorbidities. Has appt later this month with [facility] [place] for 2nd opinion  Had epidural injection in December that provided 50% relief for more than 6 weeks and scheduled for another one next week. Here today requesting refill of medication to help with pain control     HPI:   Back Pain  This is a chronic problem. The current episode started more than 1 year ago. The problem occurs constantly. The problem has been gradually worsening since onset. The pain is present in the lumbar spine. The quality of the pain is described as aching. The pain radiates to the right foot and left foot. The pain is at a severity of 10/10. The pain is severe. The pain is worse during the day. The symptoms are aggravated by standing and position. Associated symptoms include weakness. Pertinent negatives include no abdominal pain, bladder incontinence, chest pain, fever, headaches, numbness or paresthesias. Risk factors include obesity, poor posture, sedentary lifestyle and lack of exercise. He has tried analgesics for the symptoms. The treatment provided no relief.      Medication Refill:     Pain score Today:  10  Adverse effects (Constipation / Nausea / Sedation / sexual Dysfunction / others) : None  Mood: poor  Sleep pattern and quality: poor  Activity level: poor    Pill count Today: last filled 12/21  Last dose taken   OARRS report reviewed today: yes  ER/Hospitalizations/PCP visit related to pain since last visit:no   Any legal problems e.g. DUI etc.:No  Satisfied with current management: No, Patient states he is in immense pain that radiates down both sides to toes. Opioid Contract:N/A  Last Urine Dug screen dated:N/A    Lab Results   Component Value Date    LABA1C 4.2 08/19/2021     Lab Results   Component Value Date    EAG 74 08/19/2021       Past Medical History, Past Surgical History, Social History, Allergies and Medications reviewed and updated in EPIC as indicated    Family History reviewed and is noncontributory. Periodic Controlled Substance Monitoring: Possible medication side effects, risk of tolerance/dependence & alternative treatments discussed. ,No signs of potential drug abuse or diversion identified. ,Assessed functional status. ,Obtaining appropriate analgesic effect of treatment.  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 Scott Garcia, orthopedic, every 3-4 months, gets steroid injection    Lezama esophagus     BPH (benign prostatic hypertrophy)     Cholelithiasis     Cirrhosis ([address]     COVID-19 12/2020    pt reports he had a positive test while at [facility] in 2020, was asymptomatic    COVID-19 vaccine series completed 5/20/2021, 6/22/2021    Moderna 5/20/2021, 6/22/2021    DDD (degenerative disc disease), lumbar     Depression     Esophageal cancer ([address]     INVASIVE ADENOCARCINOMA ARISING IN TUBULAR ADENOMA WITH HIGH GRADE DYSPLASIA, ASSOCIATED WITH FOCAL INTESTINAL METAPLASIA     Esophageal varices ([address]     Fatty liver     GERD (gastroesophageal reflux disease)     Hep C w/o coma, chronic ([address]     MD at [facility] Bilateral 8/17/2020    LUMBAR FACET BILATERAL L2-L5 performed by Antonietta Fulton MD at [facility] Bilateral 12/7/2020    NERVE BLOCK BILATERAL LUMBAR MEDIAL BRANCH L2-L5 performed by Antonietta Fulton MD at [address] AA&/STRD TFRML EPI LUMBAR/SACRAL 1 LEVEL Bilateral 9/6/2018    BILATERAL L5 CACHORRO performed by Antonietta Fulton MD at [address] AA&/STRD TFRML EPI LUMBAR/SACRAL 1 LEVEL N/A 9/28/2018    BILATERAL L5 CACHORRO performed by Antonietta Fulton MD at [address]/CARPAL TUNNEL SURG Right 8/29/2017    CARPAL TUNNEL RELEASE RIGHT performed by Aileen Chang MD at [address]/CARPAL TUNNEL SURG Left 10/31/2017    CARPAL TUNNEL RELEASE performed by Aileen Chang MD at [facility] 12/29/2020    EGD BIOPSY performed by Jan Oliveros MD at [address] [place] [address] 2/2/2021    EGD BIOPSY and spot marking performed by Tabatha Albarran MD at [address] [place] [address] 2/12/2021    ENDOSCOPIC ULTRASOUND, EGD performed by Cassandra Ambriz MD at [address] [place] [address]  2/12/2021    EGD DIAGNOSTIC ONLY performed by Cassandra Ambriz MD at [address] [place] [address] N/A 8/31/2021    EGD BIOPSY performed by Tabatha Albarran MD at [address] [place] [address] N/A 1/21/2022    EGD BIOPSY performed by Tabatha Albarran MD at [address] [place] [address]   Allergen Reactions    Bee Pollen Other (See Comments)     Runny nose, watery eyes    Pollen Extract      Runny nose, watery eyes         Current Outpatient Medications:     oxyCODONE-acetaminophen (PERCOCET) 5-325 MG per tablet, Take 1 tablet by mouth daily as needed for Pain for up to 30 days. , Disp: 30 tablet, Rfl: 0    furosemide (LASIX) 20 MG tablet, take 1 tablet by mouth once daily, Disp: 60 tablet, Rfl: 0    vitamin D3 (CHOLECALCIFEROL) 25 MCG (1000 UT) TABS tablet, take 1 tablet by mouth once daily, Disp: 30 tablet, Rfl: 0    nadolol (CORGARD) 20 MG tablet, Take 1 tablet by mouth daily, Disp: 30 tablet, Rfl: 3    ferrous sulfate (IRON 325) 325 (65 Fe) MG tablet, Take 1 tablet by mouth 2 times daily, Disp: 60 tablet, Rfl: 5    omeprazole (PRILOSEC) 40 MG delayed release capsule, take 1 capsule by mouth EVERY MORNING BEFORE BREAKFAST, Disp: 30 capsule, Rfl: 2    FLUoxetine (PROZAC) 20 MG capsule, take 1 capsule by mouth once daily, Disp: 30 capsule, Rfl: 3    lidocaine-prilocaine (EMLA) 2.5-2.5 % cream, Apply topically 45-60 mins prior to needle poke daily PRN, Disp: 1 Tube, Rfl: 2    hydrOXYzine (VISTARIL) 25 MG capsule, take 1 capsule by mouth three times a day if needed for anxiety, Disp: 90 capsule, Rfl: 2    atorvastatin (LIPITOR) 20 MG tablet, take 1 tablet by mouth at bedtime, Disp: 90 tablet, Rfl: 1    spironolactone (ALDACTONE) 50 MG tablet, take 1 tablet by mouth once daily, Disp: 90 tablet, Rfl: 3    Family History   Problem Relation Age of Onset    Cancer Mother         pancreatic    Cancer Father         bone    Diabetes Sister     Asthma Brother        Social History     Socioeconomic History    Marital status: Single     Spouse name: Not on file    Number of children: Not on file    Years of education: Not on file    Highest education level: Not on file   Occupational History    Not on file   Tobacco Use    Smoking status: Former Smoker     Packs/day: 1.00     Years: 45.00     Pack years: 45.00     Quit date: 2017     Years since quittin.2    Smokeless tobacco: Never Used   Vaping Use    Vaping Use: Never used   Substance and Sexual Activity    Alcohol use: Not Currently     Comment: quit     Drug use: Not Currently     Frequency: 1.0 times per week     Comment: cocaine,  stopped spring 2016    Sexual activity: Yes Partners: Female   Other Topics Concern    Not on file   Social History Narrative     in the past, retired     Social Determinants of Health     Financial Resource Strain:     Difficulty of Paying Living Expenses: Not on file   Food Insecurity:     Worried About [address] in the Last Year: Not on file    [address] in the Last Year: Not on file   Transportation Needs:     Lack of Transportation (Medical): Not on file    Lack of Transportation (Non-Medical): Not on file   Physical Activity:     Days of Exercise per Week: Not on file    Minutes of Exercise per Session: Not on file   Stress:     Feeling of Stress : Not on file   Social Connections:     Frequency of Communication with Friends and Family: Not on file    Frequency of Social Gatherings with Friends and Family: Not on file    Attends Gnosticist Services: Not on file    Active Member of [address] [place] [address] or Organizations: Not on file    Attends Club or Organization Meetings: Not on file    Marital Status: Not on file   Intimate Partner Violence:     Fear of Current or Ex-Partner: Not on file    Emotionally Abused: Not on file    Physically Abused: Not on file    Sexually Abused: Not on file   Housing Stability:     Unable to Pay for Housing in the Last Year: Not on file    Number of [place] in the Last Year: Not on file    Unstable Housing in the Last Year: Not on file       Review of Systems:  Review of Systems   Constitutional: Negative for chills, decreased appetite and fever. HENT: Negative for congestion and sore throat. Eyes: Negative for blurred vision, double vision and pain. Cardiovascular: Negative for chest pain. Respiratory: Positive for shortness of breath. Negative for cough and wheezing. Endocrine: Negative for cold intolerance and heat intolerance. Hematologic/Lymphatic: Bruises/bleeds easily.    Musculoskeletal: Positive for back pain, falls, muscle weakness, myalgias, neck pain and stiffness. Gastrointestinal: Negative for bloating, abdominal pain and constipation. Genitourinary: Negative for bladder incontinence. Neurological: Positive for weakness. Negative for dizziness, headaches, numbness and paresthesias. Physical Exam:  BP (!) 87/58   Pulse 110   Ht 5' 10\" (1.778 m)   Wt 198 lb (89.8 kg)   SpO2 (!) 89%   BMI 28.41 kg/m²     Physical Exam  Cardiovascular:      Rate and Rhythm: Normal rate. Pulmonary:      Effort: Pulmonary effort is normal.   Musculoskeletal:      Lumbar back: Decreased range of motion. Comments: In w/c   Skin:     General: Skin is warm and dry. Coloration: Skin is jaundiced. Neurological:      Mental Status: He is alert and oriented to person, place, and time. Assessment:  Problem List Items Addressed This Visit     Vertebrogenic low back pain    Relevant Medications    oxyCODONE-acetaminophen (PERCOCET) 5-325 MG per tablet    Lumbar facet arthropathy    Relevant Medications    oxyCODONE-acetaminophen (PERCOCET) 5-325 MG per tablet    Cervical spinal stenosis    Relevant Medications    oxyCODONE-acetaminophen (PERCOCET) 5-325 MG per tablet    Spinal stenosis of lumbar region with neurogenic claudication - Primary    Relevant Medications    oxyCODONE-acetaminophen (PERCOCET) 5-325 MG per tablet      Other Visit Diagnoses     Chronic use of opiate for therapeutic purpose                 Treatment Plan:  Patient relates current medications are helping the pain. Patient reports taking pain medications as prescribed, denies obtaining medications from different sources and denies use of illegal drugs. Patient denies side effects from medications like nausea, vomiting, constipation or drowsiness. Patient reports current activities of daily living are possible due to medications and would like to continue them.      As always, we encourage daily stretching and strengthening exercises, and recommend minimizing use of pain medications unless patient cannot get through daily activities due to pain. Script written for percocet  Follow up appointment made for 4 weeks for f/u after LESI and NS appt    I have reviewed the chief complaint and history of present illness (including ROS and PFSH) and vital documentation by my staff and I agree with their documentation and have added where applicable.

## 2022-04-13 ENCOUNTER — HOSPITAL ENCOUNTER (OUTPATIENT)
Dept: PAIN MANAGEMENT | Facility: CLINIC | Age: 62
Discharge: HOME | End: 2022-04-13

## 2022-04-13 ENCOUNTER — HOSPITAL ENCOUNTER (INPATIENT)
Age: 62
LOS: 8 days | Discharge: NURSING, REHAB OR LONG-TERM CARE | DRG: 378 | End: 2022-04-21
Attending: EMERGENCY MEDICINE | Admitting: INTERNAL MEDICINE
Payer: MEDICARE

## 2022-04-13 VITALS
SYSTOLIC BLOOD PRESSURE: 120 MMHG | TEMPERATURE: 97 F | DIASTOLIC BLOOD PRESSURE: 56 MMHG | BODY MASS INDEX: 30.06 KG/M2 | HEART RATE: 98 BPM | HEIGHT: 70 IN | OXYGEN SATURATION: 97 % | RESPIRATION RATE: 14 BRPM | WEIGHT: 210 LBS

## 2022-04-13 DIAGNOSIS — R52 PAIN MANAGEMENT: ICD-10-CM

## 2022-04-13 DIAGNOSIS — D64.9 ANEMIA, UNSPECIFIED TYPE: Primary | ICD-10-CM

## 2022-04-13 PROBLEM — N17.9 ACUTE KIDNEY INJURY (HCC): Status: ACTIVE | Noted: 2022-04-13

## 2022-04-13 LAB
ABSOLUTE BANDS #: 0.14 K/UL (ref 0–1)
ABSOLUTE EOS #: 0 K/UL (ref 0–0.4)
ABSOLUTE EOS #: 0 K/UL (ref 0–0.4)
ABSOLUTE LYMPH #: 0.28 K/UL (ref 1–4.8)
ABSOLUTE LYMPH #: 0.43 K/UL (ref 1–4.8)
ABSOLUTE MONO #: 0.99 K/UL (ref 0.1–1.3)
ABSOLUTE MONO #: 1.7 K/UL (ref 0.1–1.3)
ABSOLUTE RETIC #: 0.09 M/UL (ref 0.02–0.1)
AFP: 2.5 UG/L
ALBUMIN SERPL-MCNC: 2.9 G/DL (ref 3.5–5.2)
ALP BLD-CCNC: 134 U/L (ref 40–129)
ALT SERPL-CCNC: 20 U/L (ref 5–41)
AMMONIA: 32 UMOL/L (ref 16–60)
ANION GAP SERPL CALCULATED.3IONS-SCNC: 20 MMOL/L (ref 9–17)
AST SERPL-CCNC: 33 U/L
BANDS: 1 % (ref 0–10)
BASOPHILS # BLD: 0 % (ref 0–2)
BASOPHILS # BLD: 0 % (ref 0–2)
BASOPHILS ABSOLUTE: 0 K/UL (ref 0–0.2)
BASOPHILS ABSOLUTE: 0 K/UL (ref 0–0.2)
BILIRUB SERPL-MCNC: 2.4 MG/DL (ref 0.3–1.2)
BUN BLDV-MCNC: 36 MG/DL (ref 8–23)
CALCIUM SERPL-MCNC: 9.2 MG/DL (ref 8.6–10.4)
CHLORIDE BLD-SCNC: 80 MMOL/L (ref 98–107)
CO2: 20 MMOL/L (ref 20–31)
CREAT SERPL-MCNC: 1.42 MG/DL (ref 0.7–1.2)
EOSINOPHILS RELATIVE PERCENT: 0 % (ref 0–4)
EOSINOPHILS RELATIVE PERCENT: 0 % (ref 0–4)
FERRITIN: 20 NG/ML (ref 30–400)
GFR AFRICAN AMERICAN: >60 ML/MIN
GFR NON-AFRICAN AMERICAN: 51 ML/MIN
GFR SERPL CREATININE-BSD FRML MDRD: ABNORMAL ML/MIN/{1.73_M2}
GLUCOSE BLD-MCNC: 111 MG/DL (ref 70–99)
HCT VFR BLD CALC: 12.6 % (ref 41–53)
HCT VFR BLD CALC: 12.9 % (ref 41–53)
HCT VFR BLD CALC: 14 % (ref 41–53)
HEMOGLOBIN: 3.5 G/DL (ref 13.5–17.5)
HEMOGLOBIN: 3.6 G/DL (ref 13.5–17.5)
HEMOGLOBIN: 4.3 G/DL (ref 13.5–17.5)
INR BLD: 1.5
IRON SATURATION: 28 % (ref 20–55)
IRON: 79 UG/DL (ref 59–158)
LACTIC ACID: 2 MMOL/L (ref 0.5–2.2)
LYMPHOCYTES # BLD: 2 % (ref 24–44)
LYMPHOCYTES # BLD: 3 % (ref 24–44)
MCH RBC QN AUTO: 19.9 PG (ref 26–34)
MCH RBC QN AUTO: 19.9 PG (ref 26–34)
MCHC RBC AUTO-ENTMCNC: 27.9 G/DL (ref 31–37)
MCHC RBC AUTO-ENTMCNC: 28.1 G/DL (ref 31–37)
MCV RBC AUTO: 71 FL (ref 80–100)
MCV RBC AUTO: 71.4 FL (ref 80–100)
MONOCYTES # BLD: 12 % (ref 1–7)
MONOCYTES # BLD: 7 % (ref 1–7)
MORPHOLOGY: ABNORMAL
NUCLEATED RED BLOOD CELLS: 1 PER 100 WBC
PARTIAL THROMBOPLASTIN TIME: 34.8 SEC (ref 24–36)
PDW BLD-RTO: 18.9 % (ref 11.5–14.9)
PDW BLD-RTO: 18.9 % (ref 11.5–14.9)
PLATELET # BLD: 370 K/UL (ref 150–450)
PLATELET # BLD: 370 K/UL (ref 150–450)
PMV BLD AUTO: 7.5 FL (ref 6–12)
PMV BLD AUTO: 7.6 FL (ref 6–12)
POTASSIUM SERPL-SCNC: 5.2 MMOL/L (ref 3.7–5.3)
PROTHROMBIN TIME: 18.3 SEC (ref 11.8–14.6)
RBC # BLD: 1.78 M/UL (ref 4.5–5.9)
RBC # BLD: 1.8 M/UL (ref 4.5–5.9)
RETIC %: 4.6 % (ref 0.5–2)
SEG NEUTROPHILS: 84 % (ref 36–66)
SEG NEUTROPHILS: 91 % (ref 36–66)
SEGMENTED NEUTROPHILS ABSOLUTE COUNT: 11.93 K/UL (ref 1.3–9.1)
SEGMENTED NEUTROPHILS ABSOLUTE COUNT: 12.89 K/UL (ref 1.3–9.1)
SODIUM BLD-SCNC: 120 MMOL/L (ref 135–144)
TOTAL IRON BINDING CAPACITY: 280 UG/DL (ref 250–450)
TOTAL PROTEIN: 5.2 G/DL (ref 6.4–8.3)
UNSATURATED IRON BINDING CAPACITY: 201 UG/DL (ref 112–347)
WBC # BLD: 14.2 K/UL (ref 3.5–11)
WBC # BLD: 14.2 K/UL (ref 3.5–11)

## 2022-04-13 PROCEDURE — 85014 HEMATOCRIT: CPT

## 2022-04-13 PROCEDURE — 82105 ALPHA-FETOPROTEIN SERUM: CPT

## 2022-04-13 PROCEDURE — 82140 ASSAY OF AMMONIA: CPT

## 2022-04-13 PROCEDURE — 99285 EMERGENCY DEPT VISIT HI MDM: CPT

## 2022-04-13 PROCEDURE — 6360000002 HC RX W HCPCS: Performed by: INTERNAL MEDICINE

## 2022-04-13 PROCEDURE — 85610 PROTHROMBIN TIME: CPT

## 2022-04-13 PROCEDURE — 99223 1ST HOSP IP/OBS HIGH 75: CPT | Performed by: INTERNAL MEDICINE

## 2022-04-13 PROCEDURE — 82728 ASSAY OF FERRITIN: CPT

## 2022-04-13 PROCEDURE — 36430 TRANSFUSION BLD/BLD COMPNT: CPT

## 2022-04-13 PROCEDURE — 6360000002 HC RX W HCPCS

## 2022-04-13 PROCEDURE — 2580000003 HC RX 258: Performed by: EMERGENCY MEDICINE

## 2022-04-13 PROCEDURE — 87040 BLOOD CULTURE FOR BACTERIA: CPT

## 2022-04-13 PROCEDURE — 86920 COMPATIBILITY TEST SPIN: CPT

## 2022-04-13 PROCEDURE — 99222 1ST HOSP IP/OBS MODERATE 55: CPT | Performed by: INTERNAL MEDICINE

## 2022-04-13 PROCEDURE — 85018 HEMOGLOBIN: CPT

## 2022-04-13 PROCEDURE — 2060000000 HC ICU INTERMEDIATE R&B

## 2022-04-13 PROCEDURE — 85025 COMPLETE CBC W/AUTO DIFF WBC: CPT

## 2022-04-13 PROCEDURE — 86900 BLOOD TYPING SEROLOGIC ABO: CPT

## 2022-04-13 PROCEDURE — 80053 COMPREHEN METABOLIC PANEL: CPT

## 2022-04-13 PROCEDURE — 6360000002 HC RX W HCPCS: Performed by: EMERGENCY MEDICINE

## 2022-04-13 PROCEDURE — 2580000003 HC RX 258: Performed by: INTERNAL MEDICINE

## 2022-04-13 PROCEDURE — 96374 THER/PROPH/DIAG INJ IV PUSH: CPT

## 2022-04-13 PROCEDURE — 86850 RBC ANTIBODY SCREEN: CPT

## 2022-04-13 PROCEDURE — 85045 AUTOMATED RETICULOCYTE COUNT: CPT

## 2022-04-13 PROCEDURE — A4216 STERILE WATER/SALINE, 10 ML: HCPCS | Performed by: INTERNAL MEDICINE

## 2022-04-13 PROCEDURE — C9113 INJ PANTOPRAZOLE SODIUM, VIA: HCPCS | Performed by: INTERNAL MEDICINE

## 2022-04-13 PROCEDURE — 86901 BLOOD TYPING SEROLOGIC RH(D): CPT

## 2022-04-13 PROCEDURE — 83605 ASSAY OF LACTIC ACID: CPT

## 2022-04-13 PROCEDURE — 2580000003 HC RX 258

## 2022-04-13 PROCEDURE — C9113 INJ PANTOPRAZOLE SODIUM, VIA: HCPCS

## 2022-04-13 PROCEDURE — P9016 RBC LEUKOCYTES REDUCED: HCPCS

## 2022-04-13 PROCEDURE — 83550 IRON BINDING TEST: CPT

## 2022-04-13 PROCEDURE — 83930 ASSAY OF BLOOD OSMOLALITY: CPT

## 2022-04-13 PROCEDURE — P9047 ALBUMIN (HUMAN), 25%, 50ML: HCPCS | Performed by: NURSE PRACTITIONER

## 2022-04-13 PROCEDURE — 83540 ASSAY OF IRON: CPT

## 2022-04-13 PROCEDURE — 85730 THROMBOPLASTIN TIME PARTIAL: CPT

## 2022-04-13 PROCEDURE — 6360000002 HC RX W HCPCS: Performed by: NURSE PRACTITIONER

## 2022-04-13 PROCEDURE — 36415 COLL VENOUS BLD VENIPUNCTURE: CPT

## 2022-04-13 RX ORDER — 0.9 % SODIUM CHLORIDE 0.9 %
1000 INTRAVENOUS SOLUTION INTRAVENOUS ONCE
Status: COMPLETED | OUTPATIENT
Start: 2022-04-13 | End: 2022-04-13

## 2022-04-13 RX ORDER — DEXTROSE AND SODIUM CHLORIDE 5; .9 G/100ML; G/100ML
INJECTION, SOLUTION INTRAVENOUS CONTINUOUS
Status: DISCONTINUED | OUTPATIENT
Start: 2022-04-13 | End: 2022-04-14

## 2022-04-13 RX ORDER — ONDANSETRON 2 MG/ML
4 INJECTION INTRAMUSCULAR; INTRAVENOUS EVERY 6 HOURS PRN
Status: DISCONTINUED | OUTPATIENT
Start: 2022-04-13 | End: 2022-04-21 | Stop reason: HOSPADM

## 2022-04-13 RX ORDER — POLYETHYLENE GLYCOL 3350 17 G/17G
17 POWDER, FOR SOLUTION ORAL DAILY PRN
Status: DISCONTINUED | OUTPATIENT
Start: 2022-04-13 | End: 2022-04-21 | Stop reason: HOSPADM

## 2022-04-13 RX ORDER — SODIUM CHLORIDE 0.9 % (FLUSH) 0.9 %
5-40 SYRINGE (ML) INJECTION 2 TIMES DAILY
Status: DISCONTINUED | OUTPATIENT
Start: 2022-04-13 | End: 2022-04-21 | Stop reason: HOSPADM

## 2022-04-13 RX ORDER — SODIUM CHLORIDE 9 MG/ML
INJECTION, SOLUTION INTRAVENOUS PRN
Status: DISCONTINUED | OUTPATIENT
Start: 2022-04-13 | End: 2022-04-21 | Stop reason: HOSPADM

## 2022-04-13 RX ORDER — FLUOXETINE HYDROCHLORIDE 20 MG/1
20 CAPSULE ORAL DAILY
Status: DISCONTINUED | OUTPATIENT
Start: 2022-04-13 | End: 2022-04-21 | Stop reason: HOSPADM

## 2022-04-13 RX ORDER — LACTULOSE 10 G/15ML
20 SOLUTION ORAL 3 TIMES DAILY
Status: DISCONTINUED | OUTPATIENT
Start: 2022-04-13 | End: 2022-04-21 | Stop reason: HOSPADM

## 2022-04-13 RX ORDER — SODIUM CHLORIDE 9 MG/ML
INJECTION, SOLUTION INTRAVENOUS PRN
Status: DISCONTINUED | OUTPATIENT
Start: 2022-04-13 | End: 2022-04-16 | Stop reason: SDUPTHER

## 2022-04-13 RX ORDER — ATORVASTATIN CALCIUM 20 MG/1
20 TABLET, FILM COATED ORAL NIGHTLY
Status: DISCONTINUED | OUTPATIENT
Start: 2022-04-13 | End: 2022-04-21 | Stop reason: HOSPADM

## 2022-04-13 RX ORDER — MIDODRINE HYDROCHLORIDE 5 MG/1
5 TABLET ORAL 3 TIMES DAILY PRN
Status: DISCONTINUED | OUTPATIENT
Start: 2022-04-13 | End: 2022-04-21 | Stop reason: HOSPADM

## 2022-04-13 RX ORDER — ONDANSETRON 4 MG/1
4 TABLET, ORALLY DISINTEGRATING ORAL EVERY 8 HOURS PRN
Status: DISCONTINUED | OUTPATIENT
Start: 2022-04-13 | End: 2022-04-21 | Stop reason: HOSPADM

## 2022-04-13 RX ORDER — SODIUM CHLORIDE 0.9 % (FLUSH) 0.9 %
5-40 SYRINGE (ML) INJECTION PRN
Status: DISCONTINUED | OUTPATIENT
Start: 2022-04-13 | End: 2022-04-21 | Stop reason: HOSPADM

## 2022-04-13 RX ORDER — ACETAMINOPHEN 325 MG/1
650 TABLET ORAL EVERY 6 HOURS PRN
Status: DISCONTINUED | OUTPATIENT
Start: 2022-04-13 | End: 2022-04-13

## 2022-04-13 RX ORDER — NADOLOL 20 MG/1
20 TABLET ORAL DAILY
Status: DISCONTINUED | OUTPATIENT
Start: 2022-04-13 | End: 2022-04-13

## 2022-04-13 RX ORDER — CYCLOBENZAPRINE HCL 10 MG
10 TABLET ORAL DAILY PRN
COMMUNITY
End: 2022-05-05 | Stop reason: ALTCHOICE

## 2022-04-13 RX ORDER — ACETAMINOPHEN 650 MG/1
650 SUPPOSITORY RECTAL EVERY 6 HOURS PRN
Status: DISCONTINUED | OUTPATIENT
Start: 2022-04-13 | End: 2022-04-13

## 2022-04-13 RX ORDER — ALBUMIN (HUMAN) 12.5 G/50ML
25 SOLUTION INTRAVENOUS ONCE
Status: COMPLETED | OUTPATIENT
Start: 2022-04-13 | End: 2022-04-13

## 2022-04-13 RX ORDER — SODIUM CHLORIDE 9 MG/ML
25 INJECTION, SOLUTION INTRAVENOUS PRN
Status: DISCONTINUED | OUTPATIENT
Start: 2022-04-13 | End: 2022-04-21 | Stop reason: HOSPADM

## 2022-04-13 RX ORDER — SODIUM CHLORIDE 9 MG/ML
INJECTION, SOLUTION INTRAVENOUS CONTINUOUS
Status: DISCONTINUED | OUTPATIENT
Start: 2022-04-13 | End: 2022-04-13

## 2022-04-13 RX ORDER — OXYCODONE HYDROCHLORIDE AND ACETAMINOPHEN 5; 325 MG/1; MG/1
1 TABLET ORAL DAILY PRN
Status: DISCONTINUED | OUTPATIENT
Start: 2022-04-13 | End: 2022-04-16

## 2022-04-13 RX ORDER — ONDANSETRON 2 MG/ML
4 INJECTION INTRAMUSCULAR; INTRAVENOUS ONCE
Status: COMPLETED | OUTPATIENT
Start: 2022-04-13 | End: 2022-04-13

## 2022-04-13 RX ORDER — SODIUM CHLORIDE 0.9 % (FLUSH) 0.9 %
5-40 SYRINGE (ML) INJECTION EVERY 12 HOURS SCHEDULED
Status: DISCONTINUED | OUTPATIENT
Start: 2022-04-13 | End: 2022-04-21 | Stop reason: HOSPADM

## 2022-04-13 RX ORDER — HYDROXYZINE HYDROCHLORIDE 10 MG/1
10 TABLET, FILM COATED ORAL ONCE
Status: DISCONTINUED | OUTPATIENT
Start: 2022-04-13 | End: 2022-04-15

## 2022-04-13 RX ADMIN — ALBUMIN (HUMAN) 25 G: 0.25 INJECTION, SOLUTION INTRAVENOUS at 18:20

## 2022-04-13 RX ADMIN — IRON SUCROSE 200 MG: 20 INJECTION, SOLUTION INTRAVENOUS at 22:50

## 2022-04-13 RX ADMIN — ONDANSETRON 4 MG: 2 INJECTION INTRAMUSCULAR; INTRAVENOUS at 11:18

## 2022-04-13 RX ADMIN — PANTOPRAZOLE SODIUM 8 MG/HR: 40 INJECTION, POWDER, FOR SOLUTION INTRAVENOUS at 18:20

## 2022-04-13 RX ADMIN — SODIUM CHLORIDE 1000 ML: 9 INJECTION, SOLUTION INTRAVENOUS at 11:29

## 2022-04-13 RX ADMIN — SODIUM CHLORIDE 40 MG: 9 INJECTION, SOLUTION INTRAMUSCULAR; INTRAVENOUS; SUBCUTANEOUS at 14:42

## 2022-04-13 RX ADMIN — ONDANSETRON 4 MG: 2 INJECTION INTRAMUSCULAR; INTRAVENOUS at 18:18

## 2022-04-13 RX ADMIN — DEXTROSE AND SODIUM CHLORIDE: 5; 900 INJECTION, SOLUTION INTRAVENOUS at 18:19

## 2022-04-13 ASSESSMENT — ENCOUNTER SYMPTOMS
COUGH: 0
ABDOMINAL PAIN: 0
BLOOD IN STOOL: 0
SINUS PRESSURE: 0
DIARRHEA: 0
EYE REDNESS: 0
BACK PAIN: 0
CONSTIPATION: 0
NAUSEA: 1
SORE THROAT: 0
WHEEZING: 0
FACIAL SWELLING: 0
CHEST TIGHTNESS: 0
ABDOMINAL DISTENTION: 1
SHORTNESS OF BREATH: 1
EYE DISCHARGE: 0
RHINORRHEA: 0
COLOR CHANGE: 0
EYE PAIN: 0
TROUBLE SWALLOWING: 0
VOMITING: 0

## 2022-04-13 ASSESSMENT — PAIN - FUNCTIONAL ASSESSMENT
PAIN_FUNCTIONAL_ASSESSMENT: 0-10
PAIN_FUNCTIONAL_ASSESSMENT: 0-10
PAIN_FUNCTIONAL_ASSESSMENT: PREVENTS OR INTERFERES WITH ALL ACTIVE AND SOME PASSIVE ACTIVITIES

## 2022-04-13 ASSESSMENT — PAIN SCALES - GENERAL
PAINLEVEL_OUTOF10: 0
PAINLEVEL_OUTOF10: 0

## 2022-04-13 ASSESSMENT — PAIN DESCRIPTION - DESCRIPTORS: DESCRIPTORS: ACHING

## 2022-04-13 NOTE — CONSULTS
_                         Today's Date: 4/13/2022  Patient Name: Rick La  Date of admission: 4/13/2022 10:10 AM  Patient's age: 57 y.o., 1960  Admission Dx: Severe anemia [D64.9]  Anemia, unspecified type [D64.9]      Requesting Physician: Amy Sánchez MD    CHIEF COMPLAINT: Excessive weakness and fatigue. Severe anemia. Esophageal cancer. History Obtained From:  patient, electronic medical record    HISTORY OF PRESENT ILLNESS:      The patient is a 57 y.o.  male who is admitted to the hospital for further management of severe anemia. Patient presents with extreme weakness and fatigue. Symptoms started about 3 to 4 days ago. He has no dizziness. He has shortness of breath on minimal exertion. No abdominal pain. No chest pain. No difficulty swallowing. No nausea or vomiting. Patient denies any melena or hematochezia. No hematemesis. The patient is known to our practice. 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 Patient received chemoradiation with very good results. Patient's labs showed severe anemia with hemoglobin of 3.6. He was hospitalized with received blood transfusion. He is slightly better. Patient has no other complaints with treatment plan. No fever or signs of infection. No respiratory distress.     Past Medical History:   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 ([address] Anxiety, Arthritis, Back pain, chronic, [place] esophagus, BPH (benign prostatic hypertrophy), Cholelithiasis, Cirrhosis ([address] COVID-19, COVID-19 vaccine series completed, DDD (degenerative disc disease), lumbar, Depression, Esophageal cancer ([address] Esophageal varices ([address] Fatty liver, GERD (gastroesophageal reflux disease), Hep C w/o coma, chronic ([address] History of alcohol abuse, History of blood transfusion, History of colon polyps, History of tobacco abuse, [place] (hard of hearing), Hyperlipidemia, Hypertension, Port-A-Cath in place, Sciatica, Spinal stenosis, Stomach ulcer, Tubular adenoma of colon, Vitamin D deficiency, and Wears glasses. Past Surgical History:   has a past surgical history that includes Bunionectomy; Nasal septum surgery; other surgical history (01/04/16); Colonoscopy; Colonoscopy (10/05/2016); other surgical history (11/21/2016); other surgical history (12/19/2016); knee surgery (Left); Bunionectomy (Left); Endoscopy, colon, diagnostic; pr revise median n/carpal tunnel surg (Right, 8/29/2017); Carpal tunnel release (Right); pr revise median n/carpal tunnel surg (Left, 10/31/2017); Colonoscopy (N/A, 3/30/2018); Colonoscopy (03/30/2018); pr njx aa&/strd tfrml epi lumbar/sacral 1 level (Bilateral, 9/6/2018); other surgical history (09/28/2018); pr njx aa&/strd tfrml epi lumbar/sacral 1 level (N/A, 9/28/2018); Pain management procedure (Left, 7/9/2020); Pain management procedure (Left, 7/20/2020); Pain management procedure (Bilateral, 8/17/2020); other surgical history (Right, 11/23/2020); Nerve Block (Right, 11/23/2020); Pain management procedure (Bilateral, 12/7/2020); Upper gastrointestinal endoscopy (N/A, 12/29/2020); Upper gastrointestinal endoscopy (N/A, 2/2/2021); Upper gastrointestinal endoscopy (N/A, 2/12/2021); Upper gastrointestinal endoscopy (2/12/2021); [place] tooth extraction; IR PORT PLACEMENT > 5 YEARS (4/19/2021); Upper gastrointestinal endoscopy (N/A, 8/31/2021); and Upper gastrointestinal endoscopy (N/A, 1/21/2022). Family History: family history includes Asthma in his brother; Cancer in his father and mother; Diabetes in his sister. Social History:   reports that he quit smoking about 5 years ago. He has a 45.00 pack-year smoking history. He has never used smokeless tobacco. He reports previous alcohol use. He reports previous drug use. Frequency: 1.00 time per week. Medications:    Prior to Admission medications    Medication Sig Start Date End Date Taking?  Authorizing Provider cyclobenzaprine (FLEXERIL) 10 MG tablet Take 10 mg by mouth daily as needed for Muscle spasms   Yes Historical Provider, MD   oxyCODONE-acetaminophen (PERCOCET) 5-325 MG per tablet Take 1 tablet by mouth daily as needed for Pain for up to 30 days.  4/6/22 5/6/22  MASSIMO Peace CNP   furosemide (LASIX) 20 MG tablet take 1 tablet by mouth once daily 4/4/22   MASSIMO Street NP   nadolol (CORGARD) 20 MG tablet Take 1 tablet by mouth daily 2/8/22   Laurita Kendall MD   ferrous sulfate (IRON 325) 325 (65 Fe) MG tablet Take 1 tablet by mouth 2 times daily 1/12/22   VASU Alexander   omeprazole (PRILOSEC) 40 MG delayed release capsule take 1 capsule by mouth EVERY MORNING BEFORE BREAKFAST 11/12/21   Karissa Shaw MD   FLUoxetine (PROZAC) 20 MG capsule take 1 capsule by mouth once daily 11/12/21   VASU Alexander   lidocaine-prilocaine (EMLA) 2.5-2.5 % cream Apply topically 45-60 mins prior to needle poke daily PRN 4/22/21   Karissa Shaw MD   atorvastatin (LIPITOR) 20 MG tablet take 1 tablet by mouth at bedtime 2/23/21   MASSIMO Muir CNP   spironolactone (ALDACTONE) 50 MG tablet take 1 tablet by mouth once daily 2/23/21   MASSIMO Osman CNP     Current Facility-Administered Medications   Medication Dose Route Frequency Provider Last Rate Last Admin    0.9 % sodium chloride infusion   IntraVENous PRN Bessy Higgins MD        sodium chloride flush 0.9 % injection 5-40 mL  5-40 mL IntraVENous 2 times per day Gilford Meth, MD        sodium chloride flush 0.9 % injection 5-40 mL  5-40 mL IntraVENous PRN Gilford Meth, MD        0.9 % sodium chloride infusion  25 mL IntraVENous PRN Gilford Meth, MD        ondansetron (ZOFRAN-ODT) disintegrating tablet 4 mg  4 mg Oral Q8H PRN Gilford Meth, MD        Or    ondansetron [facility]) injection 4 mg  4 mg IntraVENous Q6H PRN Gilford Meth, MD   4 mg at 04/13/22 1813    polyethylene glycol (GLYCOLAX) packet 17 g  17 g Oral Daily PRN Cherylene Patten, MD        hydrOXYzine (ATARAX) tablet 10 mg  10 mg Oral Once Cherylene Patten, MD        midodrine (PROAMATINE) tablet 5 mg  5 mg Oral TID PRN Celsa Curtis MD        atorvastatin (LIPITOR) tablet 20 mg  20 mg Oral Nightly Celsa Curtis MD        FLUoxetine (PROZAC) capsule 20 mg  20 mg Oral Daily Celsa Curtis MD        sodium chloride flush 0.9 % injection 5-40 mL  5-40 mL IntraVENous BID Celsa Curtis MD        albumin human 25 % IV solution 25 g  25 g IntraVENous Once Selene Spies, APRN -  mL/hr at 04/13/22 1820 25 g at 04/13/22 1820    rifaximin (XIFAXAN) tablet 550 mg  550 mg Oral BID Selene Spies, APRN - CNP        lactulose (CHRONULAC) 10 GM/15ML solution 20 g  20 g Oral TID Selene Spies, APRN - CNP        [START ON 4/14/2022] cefTRIAXone (ROCEPHIN) 2000 mg IVPB in D5W 50ml minibag  2,000 mg IntraVENous Q24H Celsa Curtis MD        dextrose 5 % and 0.9 % sodium chloride infusion   IntraVENous Continuous Celsa Curtis MD 50 mL/hr at 04/13/22 1819 New Bag at 04/13/22 1819    pantoprazole (PROTONIX) 80 mg in sodium chloride 0.9 % 100 mL infusion  8 mg/hr IntraVENous Continuous Celsa Curtis MD 10 mL/hr at 04/13/22 1820 8 mg/hr at 04/13/22 1820    0.9 % sodium chloride infusion   IntraVENous PRN Celsa Curtis MD           Allergies:  Bee pollen and Pollen extract    REVIEW OF SYSTEMS:      · General: Positive for weakness and fatigue. No unanticipated weight loss or decreased appetite. No fever or chills. · Eyes: No blurred vision, eye pain or double vision. · Ears: No hearing problems or drainage. No tinnitus. · Throat: No sore throat, problems with swallowing or dysphagia. · Respiratory: No cough, sputum or hemoptysis. Positive for exertional shortness of breath. No pleuritic chest pain. · Cardiovascular: No chest pain, orthopnea or PND. No lower extremity edema. No palpitation. · Gastrointestinal: As above.   · Genitourinary: No dysuria, hematuria, frequency or urgency. · Musculoskeletal: No muscle aches or pains. No limitation of movement. No back pain. No gait disturbance, No joint complaints. · Dermatologic: No skin rashes or pruritus. No skin lesions or discolorations. · Psychiatric: No depression, anxiety, or stress or signs of schizophrenia. No change in mood or affect. · Hematologic: No history of bleeding tendency. No bruises or ecchymosis. No history of clotting problems. · Infectious disease: No fever, chills or frequent infections. · Endocrine: No polydipsia or polyuria. No temperature intolerance. · Neurologic: No headaches or dizziness. No weakness or numbness of the extremities. No changes in balance, coordination,  memory, mentation, behavior. · Allergic/Immunologic: No nasal congestion or hives. No repeated infections. PHYSICAL EXAM:      /63   Pulse 92   Temp 97.9 °F (36.6 °C)   Resp 16   SpO2 98%    Temp (24hrs), Av.5 °F (36.4 °C), Min:97 °F (36.1 °C), Max:97.9 °F (36.6 °C)      General appearance - not in pain or distress. Looks pale.   Mental status - alert and oriented  Eyes - pupils equal and reactive, extraocular eye movements intact  Ears - bilateral TM's and external ear canals normal  Nose - normal and patent, no erythema, discharge or polyps  Mouth - mucous membranes moist, pharynx normal without lesions  Neck - supple, no significant adenopathy  Lymphatics - no palpable lymphadenopathy, no hepatosplenomegaly  Chest - clear to auscultation, no wheezes, rales or rhonchi, symmetric air entry  Heart - normal rate, regular rhythm, normal S1, S2, no murmurs, rubs, clicks or gallops  Abdomen - soft, nontender, nondistended, no masses or organomegaly  Neurological - alert, oriented, normal speech, no focal findings or movement disorder noted  Musculoskeletal - no joint tenderness, deformity or swelling  Extremities - peripheral pulses normal, no pedal edema, no clubbing or cyanosis  Skin - normal coloration and turgor, no rashes, no suspicious skin lesions noted           DATA:      Labs:       CBC:   Recent Labs     04/13/22  1020 04/13/22  1020 04/13/22  1105 04/13/22  1853   WBC 14.2*  --  14.2*  --    HGB 3.6*   < > 3.5* 4.3*   HCT 12.9*   < > 12.6* 14.0*     --  370  --     < > = values in this interval not displayed. BMP:   Recent Labs     04/13/22  1020   *   K 5.2   CO2 20   BUN 36*   CREATININE 1.42*   LABGLOM 51*   GLUCOSE 111*     PT/INR:   Recent Labs     04/13/22  1020   PROTIME 18.3*   INR 1.5     APTT:  Recent Labs     04/13/22  1020   APTT 34.8     LIVER PROFILE:  Recent Labs     04/13/22  1020   AST 33   ALT 20   LABALBU 2.9*     IR US GUIDED PARACENTESIS  Narrative: PROCEDURE:  IR US GUIDED PARACENTESIS W IMAGING    4/5/2022    HISTORY:  ORDERING SYSTEM PROVIDED HISTORY: Ascites due to alcoholic cirrhosis (HCC)    TECHNIQUE:  Sonography was utilized    CONTRAST:  None    SEDATION:  None    FLUOROSCOPY DOSE AND TYPE OR TIME AND EXPOSURES:  None    DESCRIPTION OF PROCEDURE:  Informed consent was obtained after a detailed explanation of the procedure  including risks, benefits, and alternatives. Universal protocol was  observed. Preprocedure ultrasound shows a dominant fluid pocket in the right  lowerquadrant. Using ultrasound guidance (image attached to the medical  record), the fluid pocket was accessed with a 5 [employer] Lorrie Yueh needle with  aspiration of inch clear yellow fluid. Vacuum bottle paracentesis was  performed and approximately 7.25 L was removed. the patient tolerated the  procedure well and left the department in good condition. Dr. Juan Dillon was  present. Patient received IV albumin replacement.     FINDINGS:  7.25 L drained  Impression: Successful ultrasound-guided therapeutic paracentesis            IMPRESSION:    Primary Problem  Severe anemia    Active Hospital Problems    Diagnosis Date Noted    Severe anemia [D64.9] 04/13/2022    Acute kidney injury ([facility] [N17.9] 04/13/2022    Esophageal adenocarcinoma ([facility] [C15.9]     Former smoker, 50+ pack years, quit 2016 [Z87.891] 12/20/2021    Spinal stenosis of lumbar region with neurogenic claudication [M48.062] 12/14/2021    Malignant neoplasm of lower third of esophagus ([facility] [C15.5]     Hepatitis C virus infection resolved after antiviral drug therapy [Z86.19] 12/23/2020    Hepatic cirrhosis ([facility] [K74.60] 09/15/2016    GERD (gastroesophageal reflux disease) [K21.9] 04/19/2012       RECOMMENDATIONS:  1. Records and labs and images were reviewed and discussed with the patient. 2. Patient had esophageal cancer of the lower third of the esophagus. Status post chemoradiation with good results. 3. Current presentation with severe anemia with hemoglobin of 3.6. Concerning for acute blood loss anemia. Patient does not have any clinical evidence of active bleeding still the findings of the labs are quite concerning. Patient will need GI evaluation and endoscopy. Complete. We will continue to transfuse to maintain hemoglobin above 7 and I will give IV iron infusion. 4. Further recommendations based results of endoscopy. 5. Patient's questions were answered to the best of his satisfaction and he verbalized full understanding and agreement. 6. Thank you for allowing us to participate in the care of this pleasant patient. Discussed with patient and Nurse. Karena Bryson MD, MD                            6 [facility], [facility]                            This note is created with the assistance of a speech recognition program.  While intending to generate a document that actually reflects the content of the visit, the document can still have some errors including those of syntax and sound a like substitutions which may escape proof reading. It such instances, actual meaning can be extrapolated by contextual diversion.

## 2022-04-13 NOTE — ED NOTES
Pt states he is feeling anxious and is refusing tylenol for pain. Pt states he is in pain from \"heartburn\". . MD notified     Shelia Santana RN  04/13/22 [address], RN  04/13/22 [id number]

## 2022-04-13 NOTE — PLAN OF CARE
PRE CONSULT ROUNDING NOTE  HPI  57 year old male with pmh of cirrhosis secondary to etoh abuse, hepatitis c treated with harvoni GERD esophageal cancer, esohpageal varices ,htn who presented to the ED for   Shortness of breath. He is being treated for anemia. He states he was supposed to get a pain medication injection in his back today but was unable to lay prone and was short of breath. He was sent to the ED and was found to have hgb of 3.5. he is on iron supplementation at home. He is not on anticoagulation meds. He has not had melena hematochezia or hematemesis. Reports intermittent epigastric pain that does not radiate. He has not been taking his diuretics because he \"has had low blood pressure\". He does not take lactulose, his last BM was today. He has been evaluated at the [facility] and and the last visit TIPS was discussed; the pt does not know the plan regarding this.labs show creat 1.42 na 120 wbc 14.2 inr 1.5 bili 2.40 alk phos 134 normal afp two years ago. Liver us in September of 2021 showed cirrhosis with cholelithiasis. Last paracentesis was on 4/5/22 that removed 7.5 liters; Fluid was negative for sbp. He denies fevers chills weight loss dysphagia change in the bowel habits rash. Endoscopy 1/21/22 egd ([facility])  Severe portal hypertensive gastropathy. At the previous biopsy site at the GE junction there appears to be questionable plaque polyp 2 to 3 mm seen biopsy taken-negative for malignancy. No ulcer disease noted. 3/30/18 colonoscopy ([facility]) sigmoid polyp removed-tubular adenoma    Family reports dad with liver disease, no stomach colon or liver cancer mom had pancreatic cancer,  no uc/crohns  Social quit smoking states last  etoh drink was a few weeks ago no  illicit drugs   BP (!) 111/55   Pulse 93   Temp 97.6 °F (36.4 °C) (Oral)   Resp 15   SpO2 100%     ROS as above meds labs imaging and past medical records were reviewed    Exam  General Appearance: alert and oriented to person, place and time, poor historian well-developed and well-nourished, in no acute distress  Skin: warm and dry, no rash or erythema pt is pale  Head: normocephalic and atraumatic  Eyes: pupils equal, round, and reactive to light, extraocular eye movements intact, conjunctivae normal  ENT: hearing grossly normal bilaterally  Neck: neck supple and non tender without mass, no thyromegaly or thyroid nodules, no cervical lymphadenopathy   Pulmonary/Chest: clear to auscultation bilaterally- no wheezes, rales or rhonchi, normal air movement, no respiratory distress  Cardiovascular: normal rate, regular rhythm, normal S1 and S2, no murmurs, rubs, clicks or gallops, distal pulses intact, no carotid bruits  Abdomen: soft,  non tender, distended, normal bowel sounds, no masses or organomegaly positive for  ascites  Extremities: no cyanosis, clubbing plus 1  Edema to both lower legs  Musculoskeletal: normal range of motion, no joint swelling, deformity or tenderness  Neurologic:mild encephalopathy,  no cranial nerve deficit and muscle strength normal pt has asterixis    Assessment  Cirrhosis secondary to etoh abuse (pt is still drinking) and hepatitis c (treated) with ascites, mild encephalopathy  Severe anemia with no overt bleeding  Hyponatremia  Leucocytosis  Epigastric pain; hx esophageal cancer  harry      Plan  D/w md  ppi  Ammonia and afp  Paracentesis with labs to r/o sbp, albumin ordered to remove only 5 liters  Hold diuretics due to harry  Lactulose and xifaxin  2gm low salt diet  Oncology f/u  Trend labs  Formal gi consult to follow  . Micheal Almanza, APRN - CNP

## 2022-04-13 NOTE — H&P
UPDATE:  Office visit pain clinic in [place] with all required elements of H&P dated 04/06/2022  Patient seen preop, chart reviewed,   No changes in medical history and health assessment since last evaluation. PE:  AAO x 3, in NAD, VSS,. Resp: breathing on RA, no respiratory distress  Cardiac: rate normal    Risk / Benefits explained to patient, patient agree to proceed with plan.   ASA 3  MP 3

## 2022-04-13 NOTE — H&P
[address] [place] [address]     HISTORY AND PHYSICAL EXAMINATION            Date:   4/13/2022  Patient name:  Heidi Estrada  Date of admission:  4/13/2022 10:10 AM  MRN:   [id number]  Account:  [de-identified]  YOB: 1960  PCP:    VASU Riley  Room:   08/08  Code Status:    Full Code    Chief Complaint:     Chief Complaint   Patient presents with    Shortness of Breath       History Obtained From:     patient    History of Present Illness: The patient is a 57 y.o. Non- / non  male with PMH of alcoholic liver disease/cirrhosis, history of hepatitis C (treated). He is a former smoker with 50+ pack year history, and admits to former heavy alcohol use. Denies illicit or IV drug use. Patient also has a history of GE junction adenocarcinoma s/p chemoradiation completed 6/9/21 and PET 7/23/21 with no recurrence, as well as most recent EGD on 1/21/22 which was negative for malignanacy. This EGD also showed severe portal hypertensive gastropathy with some oozing of blood visualized. He presented to the emergency department today from pain management clinic where he was scheduled to have an epidural steroid injection for back pain due to spinal stenosis. However, he was unable to lie prone due to abdominal distention and shortness of breath and he was sent to the emergency department. According to the patient, he was scheduled for therapeutic paracentesis today at [facility] this afternoon. His most recent paracentesis was 4/5/2022 with 7.5 L removed. Per patient, he states that it accumulated quickly afterwards and came back \"worse. \"  This was his second time having paracentesis done; previous paracentesis done in 2016.   He states that he has not been taking his spironolactone for the past few days, as he felt that it was making his blood pressure too low. He reports that he took all other home medications, though. Currently, patient states he has some shortness of breath which she attributes to abdominal distention, as well as nausea. However, denies  fever, chills, chest pain, abdominal pain, hematuria, or dark/bloody stool. Last bowel movement was yesterday    In the emergency department, hemoglobin was found to be 3.6, recheck 3.5. Sodium 120. Creatinine 1.42, baseline within normal limits. Total bili 2.4. INR 1.5. He was given a dose of Protonix and 1 unit PRBCs in the ED. He is being admitted to the hospital for the management of severe anemia and sepsis.     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 Natalie Davis, orthopedic, every 3-4 months, gets steroid injection    Lezama esophagus     BPH (benign prostatic hypertrophy)     Cholelithiasis     Cirrhosis ([address]     COVID-19 12/2020    pt reports he had a positive test [facility] in [facility], was asymptomatic    COVID-19 vaccine series completed 5/20/2021, 6/22/2021    Moderna 5/20/2021, 6/22/2021    DDD (degenerative disc disease), lumbar     Depression     Esophageal cancer ([address]     INVASIVE ADENOCARCINOMA ARISING IN TUBULAR ADENOMA WITH HIGH GRADE DYSPLASIA, ASSOCIATED WITH FOCAL INTESTINAL METAPLASIA     Esophageal varices ([address]     Fatty liver     GERD (gastroesophageal reflux disease)     Hep C w/o coma, chronic ([address]     History of alcohol abuse     6-12 beers a day; quit drinking July 2016    History of blood transfusion     History of colon polyps 2016    History of tobacco abuse     [place] (hard of hearing)     Hyperlipidemia     Hypertension     Port-A-Cath in place     right upper chest    Sciatica     Spinal stenosis     Stomach ulcer     hx of    Tubular adenoma of colon 2016, 2018    Vitamin D deficiency     Wears glasses         Past SurgicalHistory:     Past Surgical History:   Procedure Laterality Date    BUNIONECTOMY      twice on right side    BUNIONECTOMY Left     CARPAL TUNNEL RELEASE Right     COLONOSCOPY      at age 35    COLONOSCOPY  10/05/2016    polyps-pathology tubular adenoma, and abnormal looking mucosa right colon-pathology-tubular adenoma    COLONOSCOPY N/A 3/30/2018    COLONOSCOPY POLYPECTOMY COLD BIOPSY performed by Laurita Kendall MD at [address]  03/30/2018    Small polyp in the sigmoid colon and excised with biopsy forceps--tubular adenoma    ENDOSCOPY, COLON, DIAGNOSTIC      EGD    IR PORT PLACEMENT EQUAL OR GREATER THAN 5 YEARS  4/19/2021    IR PORT PLACEMENT EQUAL OR GREATER THAN 5 YEARS 4/19/2021 [facility] PROCEDURES    KNEE SURGERY Left     cyst removed    NASAL SEPTUM SURGERY      NERVE BLOCK Right 11/23/2020    NERVE BLOCK RIGHT CERVICAL STEROID INJECTION  C3-C6 performed by Jo Jorge MD at [address] [facility]  01/04/16    steroid injection C7 T1    OTHER SURGICAL HISTORY  11/21/2016    Bilateral Lumbar CACHORRO L4-L5 injections    OTHER SURGICAL HISTORY  12/19/2016    lumbar steroid injection    OTHER SURGICAL HISTORY  09/28/2018    BILATERAL L5 CACHORRO (N/A Back)    OTHER SURGICAL HISTORY Right 11/23/2020    cervical injection    PAIN MANAGEMENT PROCEDURE Left 7/9/2020    EPIDURAL STEROID INJECTION LEFT L4 L5 performed by Jo Jorge MD at [address] [place] [address] Left 7/20/2020    LEFT L4 L5 EPIDURAL STEROID INJECTION performed by Jo Jorge MD at [address] [place] [address] Bilateral 8/17/2020    LUMBAR FACET BILATERAL L2-L5 performed by Jo Jorge MD at [address] [place] [address] Bilateral 12/7/2020    NERVE BLOCK BILATERAL LUMBAR MEDIAL BRANCH L2-L5 performed by Jo Jorge MD at [address] AA&/STRD TFRML EPI LUMBAR/SACRAL 1 LEVEL Bilateral 9/6/2018    BILATERAL L5 CACHORRO performed by Elle Garcia MD at [address] AA&/STRD TFRML EPI LUMBAR/SACRAL 1 LEVEL N/A 9/28/2018    BILATERAL L5 CACHORRO performed by Elle Garcia MD at [address] [facility] N/CARPAL TUNNEL SURG Right 8/29/2017    CARPAL TUNNEL RELEASE RIGHT performed by Karolyn Cabrera MD at [address] [facility] N/CARPAL TUNNEL SURG Left 10/31/2017    CARPAL TUNNEL RELEASE performed by Karolyn Cabrera MD at [place] [address] 12/29/2020    EGD BIOPSY performed by Mark Hicks MD at [address] [place] [address] 2/2/2021    EGD BIOPSY and spot marking performed by Claudia Ortiz MD at [address] [place] [address] N/A 2/12/2021    ENDOSCOPIC ULTRASOUND, EGD performed by Ketan Lozano MD at [address] [place] [address]  2/12/2021    EGD DIAGNOSTIC ONLY performed by Ketan Lozano MD at [address] [place] [address] N/A 8/31/2021    EGD BIOPSY performed by Claudia Ortiz MD at [address] [place] [address] 1/21/2022    EGD BIOPSY performed by Claudia Ortiz MD at [address] [facility]          Medications Prior to Admission:        Prior to Admission medications    Medication Sig Start Date End Date Taking? Authorizing Provider   cyclobenzaprine (FLEXERIL) 10 MG tablet Take 10 mg by mouth daily as needed for Muscle spasms   Yes Historical Provider, MD   oxyCODONE-acetaminophen (PERCOCET) 5-325 MG per tablet Take 1 tablet by mouth daily as needed for Pain for up to 30 days.  4/6/22 5/6/22  MASSIMO Zhang - CNP   furosemide (LASIX) 20 MG tablet take 1 tablet by mouth once daily 4/4/22   Uriel Muse APRN - NP   nadolol (CORGARD) 20 MG tablet Take 1 tablet by mouth daily 2/8/22   Claudia Ortiz MD   ferrous sulfate (IRON 325) 325 (65 Fe) MG tablet Take 1 tablet by mouth 2 times daily 1/12/22   VASU De La Torre   omeprazole (PRILOSEC) 40 MG delayed release capsule take 1 capsule by mouth EVERY MORNING BEFORE BREAKFAST 21   Anny Villa MD   FLUoxetine (PROZAC) 20 MG capsule take 1 capsule by mouth once daily 21   VASU Demarco   lidocaine-prilocaine (EMLA) 2.5-2.5 % cream Apply topically 45-60 mins prior to needle poke daily PRN 21   Anny Villa MD   atorvastatin (LIPITOR) 20 MG tablet take 1 tablet by mouth at bedtime 21   MASSIMO Franco - CNP   spironolactone (ALDACTONE) 50 MG tablet take 1 tablet by mouth once daily 21   MASSIMO Escoto - CNP        Allergies:     Bee pollen and Pollen extract    Social History:     Tobacco:    reports that he quit smoking about 5 years ago. He has a 45.00 pack-year smoking history. He has never used smokeless tobacco.  Alcohol:      reports previous alcohol use. Drug Use:  reports previous drug use. Frequency: 1.00 time per week. Family History:     Family History   Problem Relation Age of Onset   [place] Cancer Mother         pancreatic    Cancer Father         bone    Diabetes Sister     Asthma Brother        Review of Systems:     Positive and Negative as described in HPI. Review of Systems   Constitutional: Positive for fatigue. Negative for chills and fever. Respiratory: Positive for shortness of breath. Cardiovascular: Positive for leg swelling. Negative for chest pain. Gastrointestinal: Positive for abdominal distention and nausea. Negative for abdominal pain, blood in stool, constipation and diarrhea. Genitourinary: Negative for difficulty urinating and hematuria. Hematological:        Has not noticed bleeding from any source   All other systems reviewed and are negative. Physical Exam:   BP (!) 114/59   Pulse 90   Temp 97.8 °F (36.6 °C) (Oral)   Resp 10   SpO2 100%   Temp (24hrs), Av.4 °F (36.3 °C), Min:97 °F (36.1 °C), Max:97.8 °F (36.6 °C)    No results for input(s): POCGLU in the last 72 hours.   No intake or output data in the 24 hours ending 04/13/22 1713    Physical Exam  Constitutional:       General: He is not in acute distress. Appearance: He is obese. He is not toxic-appearing. Comments: Uncomfortable appearing   Cardiovascular:      Rate and Rhythm: Normal rate and regular rhythm. Pulmonary:      Effort: Pulmonary effort is normal.      Breath sounds: Normal breath sounds. Musculoskeletal:      Right lower leg: Edema (2+) present. Left lower leg: Edema (2+) present. Skin:     Coloration: Skin is jaundiced. Neurological:      General: No focal deficit present. Mental Status: He is alert and oriented to person, place, and time.          Investigations:     Laboratory Testing:  Recent Results (from the past 24 hour(s))   CBC with Auto Differential    Collection Time: 04/13/22 10:20 AM   Result Value Ref Range    WBC 14.2 (H) 3.5 - 11.0 k/uL    RBC 1.80 (L) 4.5 - 5.9 m/uL    Hemoglobin 3.6 (LL) 13.5 - 17.5 g/dL    Hematocrit 12.9 (L) 41 - 53 %    MCV 71.4 (L) 80 - 100 fL    MCH 19.9 (L) 26 - 34 pg    MCHC 27.9 (L) 31 - 37 g/dL    RDW 18.9 (H) 11.5 - 14.9 %    Platelets 612 761 - 310 k/uL    MPV 7.5 6.0 - 12.0 fL    Seg Neutrophils 84 (H) 36 - 66 %    Lymphocytes 3 (L) 24 - 44 %    Monocytes 12 (H) 1 - 7 %    Eosinophils % 0 0 - 4 %    Basophils 0 0 - 2 %    Bands 1 0 - 10 %    Segs Absolute 11.93 (H) 1.3 - 9.1 k/uL    Absolute Lymph # 0.43 (L) 1.0 - 4.8 k/uL    Absolute Mono # 1.70 (H) 0.1 - 1.3 k/uL    Absolute Eos # 0.00 0.0 - 0.4 k/uL    Basophils Absolute 0.00 0.0 - 0.2 k/uL    Absolute Bands # 0.14 0.0 - 1.0 k/uL    Morphology HYPOCHROMIA PRESENT     Morphology ANISOCYTOSIS PRESENT     Morphology MICROCYTOSIS PRESENT     Morphology SLT POLYCHROMASIA    Comprehensive Metabolic Panel    Collection Time: 04/13/22 10:20 AM   Result Value Ref Range    Glucose 111 (H) 70 - 99 mg/dL    BUN 36 (H) 8 - 23 mg/dL    CREATININE 1.42 (H) 0.70 - 1.20 mg/dL    Calcium 9.2 8.6 - 10.4 mg/dL    Sodium 120 (L) 135 - 144 mmol/L    Potassium 5.2 3.7 - 5.3 mmol/L    Chloride 80 (L) 98 - 107 mmol/L    CO2 20 20 - 31 mmol/L    Anion Gap 20 (H) 9 - 17 mmol/L    Alkaline Phosphatase 134 (H) 40 - 129 U/L    ALT 20 5 - 41 U/L    AST 33 <40 U/L    Total Bilirubin 2.40 (H) 0.3 - 1.2 mg/dL    Total Protein 5.2 (L) 6.4 - 8.3 g/dL    Albumin 2.9 (L) 3.5 - 5.2 g/dL    GFR Non- 51 (L) >60 mL/min    GFR African American >60 >60 mL/min    GFR Comment         Protime-INR    Collection Time: 04/13/22 10:20 AM   Result Value Ref Range    Protime 18.3 (H) 11.8 - 14.6 sec    INR 1.5    APTT    Collection Time: 04/13/22 10:20 AM   Result Value Ref Range    PTT 34.8 24.0 - 36.0 sec   CBC with Auto Differential    Collection Time: 04/13/22 11:05 AM   Result Value Ref Range    WBC 14.2 (H) 3.5 - 11.0 k/uL    RBC 1.78 (L) 4.5 - 5.9 m/uL    Hemoglobin 3.5 (LL) 13.5 - 17.5 g/dL    Hematocrit 12.6 (L) 41 - 53 %    MCV 71.0 (L) 80 - 100 fL    MCH 19.9 (L) 26 - 34 pg    MCHC 28.1 (L) 31 - 37 g/dL    RDW 18.9 (H) 11.5 - 14.9 %    Platelets 179 355 - 730 k/uL    MPV 7.6 6.0 - 12.0 fL    Seg Neutrophils 91 (H) 36 - 66 %    Lymphocytes 2 (L) 24 - 44 %    Monocytes 7 1 - 7 %    Eosinophils % 0 0 - 4 %    Basophils 0 0 - 2 %    nRBC 1 (H) 0 per 100 WBC    Segs Absolute 12.89 (H) 1.3 - 9.1 k/uL    Absolute Lymph # 0.28 (L) 1.0 - 4.8 k/uL    Absolute Mono # 0.99 0.1 - 1.3 k/uL    Absolute Eos # 0.00 0.0 - 0.4 k/uL    Basophils Absolute 0.00 0.0 - 0.2 k/uL    Morphology HYPOCHROMIA PRESENT     Morphology MICROCYTOSIS PRESENT     Morphology ANISOCYTOSIS PRESENT     Morphology SLT POLYCHROMASIA    TYPE AND SCREEN    Collection Time: 04/13/22 11:05 AM   Result Value Ref Range    Expiration Date 04/16/2022,2359     Arm Band Number [id number]     ABO/Rh AB POSITIVE     Antibody Screen NEGATIVE     A1 Lectin NEGATIVE     Unit Number [id number]     Product Code Leukocyte Reduced Red Cell     Unit Divison 00     Dispense Status ISSUED     Unit Issue Date/Time [id number]     Product Code Blood Bank [id number]     Blood Bank Unit Type and Rh O POS     Blood Bank ISBT Product Blood Type 5100     Blood Bank Blood Product Expiration Date 942751015652     Transfusion Status OK TO TRANSFUSE     Crossmatch Result COMPATIBLE     Unit Number [id number]     Product Code Leukocyte Reduced Red Cell     Unit Divison 00     Dispense Status ALLOCATED     Transfusion Status OK TO TRANSFUSE     Crossmatch Result COMPATIBLE    Reticulocytes    Collection Time: 04/13/22  3:37 PM   Result Value Ref Range    Retic % 4.6 (H) 0.5 - 2.0 %    Absolute Retic # 0.094 0.0245 - 0.098 M/uL       Imaging/Diagnostics:  IR US GUIDED PARACENTESIS    Result Date: 4/5/2022  PROCEDURE: IR US GUIDED PARACENTESIS W IMAGING 4/5/2022 HISTORY: ORDERING SYSTEM PROVIDED HISTORY: Ascites due to alcoholic cirrhosis (HCC) TECHNIQUE: Sonography was utilized CONTRAST: None SEDATION: None FLUOROSCOPY DOSE AND TYPE OR TIME AND EXPOSURES: None DESCRIPTION OF PROCEDURE: Informed consent was obtained after a detailed explanation of the procedure including risks, benefits, and alternatives. Universal protocol was observed. Preprocedure ultrasound shows a dominant fluid pocket in the right lowerquadrant. Using ultrasound guidance (image attached to the medical record), the fluid pocket was accessed with a 5 [place] Lorrie Yueh needle with aspiration of inch clear yellow fluid. Vacuum bottle paracentesis was performed and approximately 7.25 L was removed. the patient tolerated the procedure well and left the department in good condition. Dr. Jojo Wall was present. Patient received IV albumin replacement.  FINDINGS: 7.25 L drained     Successful ultrasound-guided therapeutic paracentesis       Assessment :      Primary Problem  Severe anemia    Active Hospital Problems    Diagnosis Date Noted    Severe anemia [D64.9] 04/13/2022    Acute kidney injury ([address] [N17.9] 04/13/2022    Esophageal adenocarcinoma ([address] [C15.9]     Former smoker, 50+ pack years, quit 2016 [Z87.891] 12/20/2021    Spinal stenosis of lumbar region with neurogenic claudication [M48.062] 12/14/2021    Malignant neoplasm of lower third of esophagus ([facility] [C15.5]     Hepatitis C virus infection resolved after antiviral drug therapy [Z86.19] 12/23/2020    Hepatic cirrhosis ([facility] [K74.60] 09/15/2016    GERD (gastroesophageal reflux disease) [K21.9] 04/19/2012       Plan:     Patient status Admit as inpatient in the  Progressive Unit/Step down    Severe anemia in the setting of cirrhosis and portal hypertension, possibly secondary to GI bleed  -Hemoglobin 3.5 on admission  -Currently receiving 1 unit PRBCs, pending re-check will transfuse more  -H&H every 6 hours  -Continue to transfuse PRBCs until hgb >7  -Protonix drip  -FOBT pending    Sepsis, source unknown  -Tachycardia  -WBCs 14.2  -SBP in 100s, midodrine 5mg TIID PRN for SBP <110  -Urinalysis, blood cultures and urine culture pending  -Lactate pending    Hyponatremia  -Sodium 120 on admission  -Previous sodium levels within normal limits in the past  -Patient is likely intravascularly volume depleted  -IV fluid hydration with D5 NS at 50cc/h  -Patient reportedly takes Lasix and spironolactone at home; urine urea, urine creatinine, urine osmolality, serum osmolality pending    Ascites 2/2 cirrhotic liver disease  -cirrhosis 2/2 heavy alcohol use in the past; patient also has history of hepatitis C s/p treatment  -Does not appear encephalopathic at this time  -Patient had IR guided paracentesis on 4/5/2022 and had rapid reaccumulation of ascitic fluid during this time; was scheduled for paracentesis today (4/13)  -Leukocytosis of 14.2, concerning for possible SBP  -IV Rocephin for SBP prophylaxis, as well as rifaximin  -GI is following; IR consulted for paracentesis once hemoglobin stable  -MELD score 27, Child-Laguna score 8     Portal hypertension, held home lasix, nadolol, and spironolactone d/t hypotension    History of GE junction adenocarcinoma, in remission  -pt completed chemoradiation 6/9/2021; PET scan 7/23/21 showed no recurrence  -Most recent EGD done 1/21/22: flat 3-4mm polyp at GE junction which showed no malignancy    GI PPx: Protonix drip  DVT prophylaxis: SCD cuffs, no chemical anticoagulation at this time due to severe anemia and possible active bleed  Diet: NPO for procedure      Consultations:   IP CONSULT TO PRIMARY CARE PROVIDER  IP CONSULT TO GI  IP CONSULT TO ONCOLOGY    Patient is admitted as inpatient status because of co-morbidities listed above, severity of signs and symptoms as outlined, requirement for current medical therapies and most importantly because of direct risk to patient if care not provided in a hospital setting.     Agustin Cain MD  4/13/2022  5:13 PM    Copy sent to VASU Hoover

## 2022-04-13 NOTE — ED PROVIDER NOTES
[address]  eMERGENCY dEPARTMENT eNCOUnter      Pt Name: Dayanara Lucas  MRN: [id number]  Odessagfmartin 1960  Date of evaluation: 4/13/22      CHIEF COMPLAINT       Chief Complaint   Patient presents with    Shortness of [place] Cristhian Young is a 57 y.o. male who presents complaining of shortness of breath. Patient was sent over from his pain clinic where they were going to do an epidural steroid injection for his back pain but because he could not lay on his belly they sent him over. Patient has liver disease and is been bloated. Patient states that he had 7 L of fluid removed about 2 weeks ago and since then it she started reaccumulating. He has only ever had drainage done twice. Patient does follow with a GI doctor. Patient states that he does feel little nauseous and short of breath all started this morning. REVIEW OF SYSTEMS       Review of Systems   Constitutional: Negative for activity change, appetite change, chills, diaphoresis and fever. HENT: Negative for congestion, ear pain, facial swelling, nosebleeds, rhinorrhea, sinus pressure, sore throat and trouble swallowing. Eyes: Negative for pain, discharge and redness. Respiratory: Positive for shortness of breath. Negative for cough, chest tightness and wheezing. Cardiovascular: Negative for chest pain, palpitations and leg swelling. Gastrointestinal: Positive for abdominal distention and nausea. Negative for abdominal pain, blood in stool, constipation, diarrhea and vomiting. Genitourinary: Negative for difficulty urinating, dysuria, flank pain, frequency, genital sores and hematuria. Musculoskeletal: Negative for arthralgias, back pain, gait problem, joint swelling, myalgias and neck pain. Skin: Negative for color change, pallor, rash and wound. Neurological: Negative for dizziness, tremors, seizures, syncope, speech difficulty, weakness, numbness and headaches. Psychiatric/Behavioral: Negative for confusion, decreased concentration, hallucinations, self-injury, sleep disturbance and suicidal ideas.        PAST MEDICAL HISTORY     Past Medical History:   Diagnosis Date    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 Deep Blackmon, orthopedic, every 3-4 months, gets steroid injection    Lezama esophagus     BPH (benign prostatic hypertrophy)     Cholelithiasis     Cirrhosis ([address]     COVID-19 12/2020    pt reports he had a positive test while at [facility] in 2020, was asymptomatic    COVID-19 vaccine series completed 5/20/2021, 6/22/2021    Moderna 5/20/2021, 6/22/2021    DDD (degenerative disc disease), lumbar     Depression     Esophageal cancer ([address]     INVASIVE ADENOCARCINOMA ARISING IN TUBULAR ADENOMA WITH HIGH GRADE DYSPLASIA, ASSOCIATED WITH FOCAL INTESTINAL METAPLASIA     Esophageal varices ([address]     Fatty liver     GERD (gastroesophageal reflux disease)     Hep C w/o coma, chronic ([address]     History of alcohol abuse     6-12 beers a day; quit drinking July 2016    History of blood transfusion     History of colon polyps 2016    History of tobacco abuse     [place] (hard of hearing)     Hyperlipidemia     Hypertension     Port-A-Cath in place     right upper chest    Sciatica     Spinal stenosis     Stomach ulcer     hx of    Tubular adenoma of colon 2016, 2018    Vitamin D deficiency     Wears glasses        SURGICAL HISTORY       Past Surgical History:   Procedure Laterality Date    BUNIONECTOMY      twice on right side    BUNIONECTOMY Left     CARPAL TUNNEL RELEASE Right     COLONOSCOPY      at age 35    COLONOSCOPY  10/05/2016    polyps-pathology tubular adenoma, and abnormal looking mucosa right colon-pathology-tubular adenoma    COLONOSCOPY N/A 3/30/2018    COLONOSCOPY POLYPECTOMY COLD BIOPSY performed by Laurita Kendall MD at [facility]  03/30/2018    Small polyp in the sigmoid colon and excised with biopsy forceps--tubular adenoma    ENDOSCOPY, COLON, DIAGNOSTIC      EGD    IR PORT PLACEMENT EQUAL OR GREATER THAN 5 YEARS  4/19/2021    IR PORT PLACEMENT EQUAL OR GREATER THAN 5 YEARS 4/19/2021 [facility]    KNEE SURGERY Left     cyst removed    NASAL SEPTUM SURGERY      NERVE BLOCK Right 11/23/2020    NERVE BLOCK RIGHT CERVICAL STEROID INJECTION  C3-C6 performed by Ezekiel Walters MD at [address]  01/04/16    steroid injection C7 T1    OTHER SURGICAL HISTORY  11/21/2016    Bilateral Lumbar CACHORRO L4-L5 injections    OTHER SURGICAL HISTORY  12/19/2016    lumbar steroid injection    OTHER SURGICAL HISTORY  09/28/2018    BILATERAL L5 CACHORRO (N/A Back)    OTHER SURGICAL HISTORY Right 11/23/2020    cervical injection    PAIN MANAGEMENT PROCEDURE Left 7/9/2020    EPIDURAL STEROID INJECTION LEFT L4 L5 performed by Ezekiel Walters MD at [place] [address] Left 7/20/2020    LEFT L4 L5 EPIDURAL STEROID INJECTION performed by Ezekiel Walters MD at [place] [address] Bilateral 8/17/2020    LUMBAR FACET BILATERAL L2-L5 performed by Ezekiel Walters MD at [place] [address] Bilateral 12/7/2020    NERVE BLOCK BILATERAL LUMBAR MEDIAL BRANCH L2-L5 performed by Ezekiel Walters MD at [address] AA&/STRD TFRML EPI LUMBAR/SACRAL 1 LEVEL Bilateral 9/6/2018    BILATERAL L5 CACHORRO performed by Ezekiel Walters MD at [address] AA&/STRD TFRML EPI LUMBAR/SACRAL 1 LEVEL N/A 9/28/2018    BILATERAL L5 CACHORRO performed by Ezekiel Walters MD at [facility] N/CARPAL TUNNEL SURG Right 8/29/2017    CARPAL TUNNEL RELEASE RIGHT performed by Alyse Bolaños MD at [address] [place] [address] N/CARPAL TUNNEL SURG Left 10/31/2017    CARPAL TUNNEL RELEASE performed by Alyse Bolaños MD at [address] [place] [address] 12/29/2020    EGD BIOPSY performed by Juan Alberto Chung MD at [place] [address]  UPPER GASTROINTESTINAL ENDOSCOPY N/A 2/2/2021    EGD BIOPSY and spot marking performed by Jorge Valentine MD at [address] [place] [address] 2/12/2021    ENDOSCOPIC ULTRASOUND, EGD performed by Maryruth Dakin, MD at [address] [place] [address]  2/12/2021    EGD DIAGNOSTIC ONLY performed by Maryruth Dakin, MD at [address] [place] [address] N/A 8/31/2021    EGD BIOPSY performed by Jorge Valentine MD at [address] [place] [address] 1/21/2022    EGD BIOPSY performed by Jorge Valentine MD at [place] [address]       Previous Medications    ATORVASTATIN (LIPITOR) 20 MG TABLET    take 1 tablet by mouth at bedtime    FERROUS SULFATE (IRON 325) 325 (65 FE) MG TABLET    Take 1 tablet by mouth 2 times daily    FLUOXETINE (PROZAC) 20 MG CAPSULE    take 1 capsule by mouth once daily    FUROSEMIDE (LASIX) 20 MG TABLET    take 1 tablet by mouth once daily    HYDROXYZINE (VISTARIL) 25 MG CAPSULE    take 1 capsule by mouth three times a day if needed for anxiety    LIDOCAINE-PRILOCAINE (EMLA) 2.5-2.5 % CREAM    Apply topically 45-60 mins prior to needle poke daily PRN    NADOLOL (CORGARD) 20 MG TABLET    Take 1 tablet by mouth daily    OMEPRAZOLE (PRILOSEC) 40 MG DELAYED RELEASE CAPSULE    take 1 capsule by mouth EVERY MORNING BEFORE BREAKFAST    OXYCODONE-ACETAMINOPHEN (PERCOCET) 5-325 MG PER TABLET    Take 1 tablet by mouth daily as needed for Pain for up to 30 days. SPIRONOLACTONE (ALDACTONE) 50 MG TABLET    take 1 tablet by mouth once daily    VITAMIN D3 (CHOLECALCIFEROL) 25 MCG (1000 UT) TABS TABLET    take 1 tablet by mouth once daily       ALLERGIES     is allergic to bee pollen and pollen extract. SOCIAL HISTORY      reports that he quit smoking about 5 years ago. He has a 45.00 pack-year smoking history.  He has never used smokeless tobacco. He reports previous by the radiologist and the interpretations are directly viewed by the emergency physician. No results found. LABS: All lab results were reviewed by myself, and all abnormals are listed below. Labs Reviewed   CBC WITH AUTO DIFFERENTIAL - Abnormal; Notable for the following components:       Result Value    WBC 14.2 (*)     RBC 1.80 (*)     Hemoglobin 3.6 (*)     Hematocrit 12.9 (*)     MCV 71.4 (*)     MCH 19.9 (*)     MCHC 27.9 (*)     RDW 18.9 (*)     Seg Neutrophils 84 (*)     Lymphocytes 3 (*)     Monocytes 12 (*)     Segs Absolute 11.93 (*)     Absolute Lymph # 0.43 (*)     Absolute Mono # 1.70 (*)     All other components within normal limits   COMPREHENSIVE METABOLIC PANEL - Abnormal; Notable for the following components:    Glucose 111 (*)     BUN 36 (*)     CREATININE 1.42 (*)     Sodium 120 (*)     Chloride 80 (*)     Anion Gap 20 (*)     Alkaline Phosphatase 134 (*)     Total Bilirubin 2.40 (*)     Total Protein 5.2 (*)     Albumin 2.9 (*)     GFR Non- 51 (*)     All other components within normal limits   PROTIME-INR - Abnormal; Notable for the following components:    Protime 18.3 (*)     All other components within normal limits   CBC WITH AUTO DIFFERENTIAL - Abnormal; Notable for the following components:    WBC 14.3 (*)     RBC 1.78 (*)     Hemoglobin 3.5 (*)     Hematocrit 12.6 (*)     MCV 71.0 (*)     MCH 19.9 (*)     MCHC 28.1 (*)     RDW 18.9 (*)     All other components within normal limits   APTT   PERIPHERAL BLOOD SMEAR, PATH REVIEW   PERIPHERAL BLOOD SMEAR, PATH REVIEW   TYPE AND SCREEN   PREPARE RBC (CROSSMATCH)         MEDICAL DECISION MAKING:     Patient looks like he needs paracentesis done again and that will probably help his symptoms and then he can go back to get his pain shot.       EMERGENCY DEPARTMENT COURSE:   Vitals:    Vitals:    04/13/22 1117 04/13/22 1118 04/13/22 1120 04/13/22 1132   BP:  (!) 101/29 (!) 90/43 110/67   Pulse: 101 102 100 98   Resp: 18 18 18 21   Temp:       TempSrc:       SpO2: 100%          The patient was given the following medications while in the emergency department:  Orders Placed This Encounter   Medications    ondansetron (ZOFRAN) injection 4 mg    0.9 % sodium chloride bolus    0.9 % sodium chloride infusion    sodium chloride flush 0.9 % injection 5-40 mL    sodium chloride flush 0.9 % injection 5-40 mL    0.9 % sodium chloride infusion    OR Linked Order Group     ondansetron (ZOFRAN-ODT) disintegrating tablet 4 mg     ondansetron (ZOFRAN) injection 4 mg    polyethylene glycol (GLYCOLAX) packet 17 g    OR Linked Order Group     acetaminophen (TYLENOL) tablet 650 mg     acetaminophen (TYLENOL) suppository 650 mg       -------------------------  11:48 AM EDT  Patient was notified of the abnormal hemoglobin. They state the patient has had to have blood transfusions in the past.  We will go ahead and get the blood transfusion as he did consent to the blood transfusion. I spoke with Dr. Sofi Shaikh who agrees to the admission. We will put the patient in intermediate ICU for the day just because his blood pressure keeps fluctuating and how low his hemoglobin is. CRITICAL CARE: There was a high probability of clinically significant/life threatening deterioration in this patient's condition which required my urgent intervention. Total critical care time was 30 minutes. This excludes any time for separately reportable procedures. CONSULTS:  IP CONSULT TO PRIMARY CARE PROVIDER    PROCEDURES:  None    FINAL IMPRESSION      1. Anemia, unspecified type          DISPOSITION/PLAN   DISPOSITION Admitted 04/13/2022 11:37:54 AM      PATIENT REFERREDTO:  No follow-up provider specified.     DISCHARGEMEDICATIONS:  New Prescriptions    No medications on file       (Please note that portions of this note were completed with a voice recognition program.  Efforts were made to edit thedictations but occasionally words are mis-transcribed.)    Tony Argueta MD  Attending Emergency Physician                       Tony Argueta MD  04/13/22 1016

## 2022-04-13 NOTE — PROGRESS NOTES
Medication History completed:    New medications: cyclobenzaprine    Medications discontinued:hydroxyzine, vitamin D3    Changes to dosing: none    Stated allergies: As listed    Other pertinent information: Medications confirmed with patient and [facility]. Of note, patient has not refilled medications through [address] [place] [address] since January. Patient states that he feels the spironolactone makes his BP too low.      Thank you,   Colleen Escalona  PharmD Candidate 2022

## 2022-04-13 NOTE — DISCHARGE INSTR - COC
Continuity of Care Form    Patient Name: Salvador Sarah   :    MRN:  [id number]    Admit date:  2022  Discharge date: 2022    Code Status Order: Full Code   Advance Directives:      Admitting Physician:  No admitting provider for patient encounter. PCP: Alvarez Calderon    Discharging Nurse: Scarlett Khan.    [address] Unit/Room#:   Discharging Unit Phone Number: [phone]    Emergency Contact:   Extended Emergency Contact Information  Primary Emergency Contact: Chetan Joya, [address] Phone: [phone]  Work Phone: [phone]  Mobile Phone: [phone]  Relation: Other    Past Surgical History:  Past Surgical History:   Procedure Laterality Date    BUNIONECTOMY      twice on right side    BUNIONECTOMY Left     CARPAL TUNNEL RELEASE Right     COLONOSCOPY      at age 35    COLONOSCOPY  10/05/2016    polyps-pathology tubular adenoma, and abnormal looking mucosa right colon-pathology-tubular adenoma    COLONOSCOPY N/A 3/30/2018    COLONOSCOPY POLYPECTOMY COLD BIOPSY performed by Mana Adame MD at [address] [place] [address]  2018    Small polyp in the sigmoid colon and excised with biopsy forceps--tubular adenoma    ENDOSCOPY, COLON, DIAGNOSTIC      EGD    IR PORT PLACEMENT EQUAL OR GREATER THAN 5 YEARS  2021    IR PORT PLACEMENT EQUAL OR GREATER THAN 5 YEARS 2021 [facility] PROCED[facility]    KNEE SURGERY Left     cyst removed    NASAL SEPTUM SURGERY      NERVE BLOCK Right 2020    NERVE BLOCK RIGHT CERVICAL STEROID INJECTION  C3-C6 performed by Margorie Crigler, MD at [address] [place] [address]  16    steroid injection C7 T1    OTHER SURGICAL HISTORY  2016    Bilateral Lumbar CACHORRO L4-L5 injections    OTHER SURGICAL HISTORY  2016    lumbar steroid injection    OTHER SURGICAL HISTORY  2018    BILATERAL L5 CACHORRO (N/A Back)    OTHER SURGICAL HISTORY Right 2020    cervical injection    PAIN MANAGEMENT PROCEDURE Left 2020 EPIDURAL STEROID INJECTION LEFT L4 L5 performed by Sabas Kennedy MD at [facility] Left 7/20/2020    LEFT L4 L5 EPIDURAL STEROID INJECTION performed by Sabas Kennedy MD at [facility] Bilateral 8/17/2020    LUMBAR FACET BILATERAL L2-L5 performed by Sabas Kennedy MD at [facility] Bilateral 12/7/2020    NERVE BLOCK BILATERAL LUMBAR MEDIAL BRANCH L2-L5 performed by Sabas Kennedy MD at [facility] AA&/STRD TFRML EPI LUMBAR/SACRAL 1 LEVEL Bilateral 9/6/2018    BILATERAL L5 CACHORRO performed by Sabas Kennedy MD at [facility] AA&/STRD TFRML EPI LUMBAR/SACRAL 1 LEVEL N/A 9/28/2018    BILATERAL L5 CACHORRO performed by Sabas Kennedy MD at [address] [place] [address] N/CARPAL TUNNEL SURG Right 8/29/2017    CARPAL TUNNEL RELEASE RIGHT performed by Joy Coughlin MD at [address] [place] [address] N/CARPAL TUNNEL SURG Left 10/31/2017    CARPAL TUNNEL RELEASE performed by Joy Coughiln MD at [address] [place] [address] 12/29/2020    EGD BIOPSY performed by Chago Miller MD at [address] [place] [address] N/A 2/2/2021    EGD BIOPSY and spot marking performed by Bridget Booth MD at [address] [place] [address] N/A 2/12/2021    ENDOSCOPIC ULTRASOUND, EGD performed by Milton Horan MD at [address] [place] [address]  2/12/2021    EGD DIAGNOSTIC ONLY performed by Milton Horan MD at [address] [place] [address] 8/31/2021    EGD BIOPSY performed by Bridget Booth MD at [address] [place] [address] N/A 1/21/2022    EGD BIOPSY performed by Bridget Booth MD at [address] [place] [address]         Immunization History:   Immunization History   Administered Date(s) Administered    Hepatitis A 09/20/2016, 03/29/2017    Hepatitis B (Recombivax HB) 09/20/2016, 10/19/2016, 03/29/2017    Influenza 11/29/2012 Influenza, Quadv, IM, (6 mo and older Fluzone, Flulaval, Fluarix and 3 yrs and older Afluria) 09/13/2017, 01/24/2019    Influenza, Harlen Kilts, IM, PF (6 mo and older Fluzone, Flulaval, Fluarix, and 3 yrs and older Afluria) 09/13/2016, 12/30/2020, 12/23/2021    Pneumococcal Polysaccharide ([id number]) 11/29/2012, 07/25/2016    Tdap (Boostrix, Adacel) 04/06/2017       Active Problems:  Patient Active Problem List   Diagnosis Code    Back pain, chronic M54.9, G89.29    Hearing difficulty H91.90    GERD (gastroesophageal reflux disease) K21.9    Cervical radicular pain M54.12    Alcohol withdrawal syndrome without complication (HCC) S25.720    Hyponatremia E87.1    Hypomagnesemia E83.42    Chronic viral hepatitis B without delta agent and without coma (HCC) B18.1    Calculus of gallbladder without cholecystitis K80.20    Hep C w/o coma, chronic (HCC) B18.2    Fatty liver K76.0    Psychophysiologic insomnia F51.04    Cirrhosis (HCC) K74.60    Hepatic cirrhosis (HCC) K74.60    Vertebrogenic low back pain M54.51    DDD (degenerative disc disease), lumbar M51.36    Depression F32. A    Tubular adenoma of colon D12.6    History of colon polyps Z86.010    Gynecomastia, male N62    Lumbar radiculitis M54.16    Lumbar disc herniation M51.26    Tinnitus H93.19    Eustachian tube dysfunction H69.80    Ganglion cyst M67.40    Carpal tunnel syndrome of right wrist G56.01    History of hepatitis C Z86.19    Vitamin D deficiency E55.9    Pure hypercholesterolemia E78.00    Hypokalemia E87.6    Essential hypertension I10    Recurrent major depressive disorder in partial remission (HCC) F33.41    S/P epidural steroid injection Z92.241    Elevated LFTs R79.89    Seasonal allergies J30.2    Lumbar facet arthropathy M47.816    Cervical facet syndrome M47.812    Acute gastrointestinal bleeding K92.2    Thrombocytopenia (HCC) D69.6    Hepatitis C virus infection resolved after antiviral drug therapy Z86.19    Gastrointestinal hemorrhage with melena K92.1    Alcohol abuse F10.10    Altered mental status R41.82    Hypocalcemia E83.51    Hypophosphatemia E83.39    Malignant neoplasm of lower third of esophagus (HCC) C15.5    COVID-19 U07.1    Anxiety F41.9    Current smoker F17.200    Severe comorbid illness R69    Gait instability R26.81    Abnormal findings on diagnostic imaging of spine R93.7    Cervical spinal stenosis M48.02    Spinal stenosis of lumbar region with neurogenic claudication M48.062    Low hemoglobin D64.9    Symptomatic anemia, microcytic, acute D64.9    Hypotension I95.9    Former smoker, 50+ pack years, quit  Z87.891    HLD (hyperlipidemia) E78.5    Esophageal adenocarcinoma (HCC) C15.9    Severe anemia D64.9       Isolation/Infection:   Isolation            No Isolation          Patient Infection Status       Infection Onset Added Last Indicated Last Indicated By Review Planned Expiration Resolved Resolved By    None active    Resolved    COVID-19 (Rule Out) 22 COVID-19 & Influenza Combo (Ordered)   22 Rule-Out Test Resulted    COVID-19 21 COVID-19   21     COVID-19 (Rule Out) 21 COVID-19 (Ordered)   21 Rule-Out Test Resulted    COVID-19 (Rule Out) 20 COVID-19 (Ordered)   20 Rule-Out Test Resulted    COVID-19 (Rule Out) 20 COVID-19 (Ordered)   20 Rule-Out Test Resulted            Nurse Assessment:  Last Vital Signs: BP (!) 111/55   Pulse 93   Temp 97.6 °F (36.4 °C) (Oral)   Resp 15   SpO2 100%     Last documented pain score (0-10 scale):    Last Weight:   Wt Readings from Last 1 Encounters:   22 210 lb (95.3 kg)     Mental Status:  oriented, alert, logical, and thought processes intact    IV Access:  - implanted port    Nursing Mobility/ADLs:  Walking   Assisted  Transfer  Assisted  Bathing  Assisted  Dressing  Assisted  Toileting  Independent  Feeding Facility (if applicable)   Name:  Address:  Dialysis Schedule:  Phone:  Fax:    / signature: Electronically signed by Shilpi Falk RN on 4/15/22 at 1:24 PM EDT    PHYSICIAN SECTION    Prognosis: Good    Condition at Discharge: Stable    Rehab Potential (if transferring to Rehab): Good    Recommended Labs or Other Treatments After Discharge:     Physician Certification: I certify the above information and transfer of Elizabeth Figueredo  is necessary for the continuing treatment of the diagnosis listed and that he requires [facility] for less 30 days.      Update Admission H&P: No change in H&P    PHYSICIAN SIGNATURE:  Electronically signed by Serenity Oviedo MD on 4/20/22 at 3:16 PM EDT

## 2022-04-14 ENCOUNTER — APPOINTMENT (OUTPATIENT)
Dept: INTERVENTIONAL RADIOLOGY/VASCULAR | Age: 62
DRG: 378 | End: 2022-04-14
Payer: MEDICARE

## 2022-04-14 LAB
ALBUMIN FLUID: 0.6 G/DL
AMORPHOUS: ABNORMAL
AMYLASE FLUID: 7 U/L
ANION GAP SERPL CALCULATED.3IONS-SCNC: 9 MMOL/L (ref 9–17)
BACTERIA: ABNORMAL
BILIRUBIN URINE: ABNORMAL
BUN BLDV-MCNC: 41 MG/DL (ref 8–23)
CALCIUM SERPL-MCNC: 8.6 MG/DL (ref 8.6–10.4)
CASTS UA: ABNORMAL /LPF
CASTS UA: ABNORMAL /LPF
CHLORIDE BLD-SCNC: 88 MMOL/L (ref 98–107)
CO2: 25 MMOL/L (ref 20–31)
COLOR: ABNORMAL
CREAT SERPL-MCNC: 1.32 MG/DL (ref 0.7–1.2)
CREATININE URINE: 83 MG/DL (ref 39–259)
DATE, STOOL #1: NORMAL
EPITHELIAL CELLS UA: ABNORMAL /HPF
GFR AFRICAN AMERICAN: >60 ML/MIN
GFR NON-AFRICAN AMERICAN: 55 ML/MIN
GFR SERPL CREATININE-BSD FRML MDRD: ABNORMAL ML/MIN/{1.73_M2}
GLUCOSE BLD-MCNC: 99 MG/DL (ref 70–99)
GLUCOSE URINE: NEGATIVE
GLUCOSE, FLUID: 115 MG/DL
HCT VFR BLD CALC: 15 % (ref 41–53)
HCT VFR BLD CALC: 18.1 % (ref 41–53)
HCT VFR BLD CALC: 19.6 % (ref 41–53)
HCT VFR BLD CALC: 21.5 % (ref 41–53)
HEMOCCULT SP1 STL QL: POSITIVE
HEMOGLOBIN: 4.7 G/DL (ref 13.5–17.5)
HEMOGLOBIN: 5.7 G/DL (ref 13.5–17.5)
HEMOGLOBIN: 6.2 G/DL (ref 13.5–17.5)
HEMOGLOBIN: 6.9 G/DL (ref 13.5–17.5)
KETONES, URINE: NEGATIVE
LACTATE DEHYDROGENASE, FLUID: 34 U/L
LEUKOCYTE ESTERASE, URINE: ABNORMAL
MAGNESIUM: 1.8 MG/DL (ref 1.6–2.6)
MCH RBC QN AUTO: 24.1 PG (ref 26–34)
MCHC RBC AUTO-ENTMCNC: 31.7 G/DL (ref 31–37)
MCV RBC AUTO: 76 FL (ref 80–100)
NITRITE, URINE: POSITIVE
OSMOLALITY URINE: 323 MOSM/KG (ref 80–1300)
PDW BLD-RTO: 20 % (ref 11.5–14.9)
PH UA: 5.5 (ref 5–8)
PHOSPHORUS: 3.9 MG/DL (ref 2.5–4.5)
PLATELET # BLD: 173 K/UL (ref 150–450)
PMV BLD AUTO: 6.8 FL (ref 6–12)
POTASSIUM SERPL-SCNC: 5.2 MMOL/L (ref 3.7–5.3)
PROTEIN UA: NEGATIVE
RBC # BLD: 2.38 M/UL (ref 4.5–5.9)
RBC UA: ABNORMAL /HPF
SARS-COV-2, RAPID: NOT DETECTED
SERUM OSMOLALITY: 269 MOSM/KG (ref 275–295)
SODIUM BLD-SCNC: 122 MMOL/L (ref 135–144)
SODIUM BLD-SCNC: 126 MMOL/L (ref 135–144)
SPECIFIC GRAVITY UA: 1.01 (ref 1–1.03)
SPECIMEN DESCRIPTION: NORMAL
SPECIMEN DESCRIPTION: NORMAL
SPECIMEN TYPE: NORMAL
SURGICAL PATHOLOGY REPORT: NORMAL
TIME, STOOL #1: NORMAL
TOTAL PROTEIN, BODY FLUID: <1 G/DL
TROPONIN, HIGH SENSITIVITY: 18 NG/L (ref 0–22)
TURBIDITY: CLEAR
UREA NITROGEN, UR: 502 MG/DL
URINE HGB: NEGATIVE
UROBILINOGEN, URINE: NORMAL
WBC # BLD: 16.4 K/UL (ref 3.5–11)
WBC UA: ABNORMAL /HPF

## 2022-04-14 PROCEDURE — 2709999900 IR US GUIDED PARACENTESIS

## 2022-04-14 PROCEDURE — 84157 ASSAY OF PROTEIN OTHER: CPT

## 2022-04-14 PROCEDURE — 89051 BODY FLUID CELL COUNT: CPT

## 2022-04-14 PROCEDURE — P9047 ALBUMIN (HUMAN), 25%, 50ML: HCPCS | Performed by: RADIOLOGY

## 2022-04-14 PROCEDURE — 81001 URINALYSIS AUTO W/SCOPE: CPT

## 2022-04-14 PROCEDURE — 2580000003 HC RX 258: Performed by: INTERNAL MEDICINE

## 2022-04-14 PROCEDURE — 2580000003 HC RX 258: Performed by: NURSE PRACTITIONER

## 2022-04-14 PROCEDURE — 0W9G3ZZ DRAINAGE OF PERITONEAL CAVITY, PERCUTANEOUS APPROACH: ICD-10-PCS | Performed by: INTERNAL MEDICINE

## 2022-04-14 PROCEDURE — 93005 ELECTROCARDIOGRAM TRACING: CPT

## 2022-04-14 PROCEDURE — 6360000002 HC RX W HCPCS: Performed by: RADIOLOGY

## 2022-04-14 PROCEDURE — 36430 TRANSFUSION BLD/BLD COMPNT: CPT

## 2022-04-14 PROCEDURE — 6360000002 HC RX W HCPCS: Performed by: INTERNAL MEDICINE

## 2022-04-14 PROCEDURE — 86927 PLASMA FRESH FROZEN: CPT

## 2022-04-14 PROCEDURE — 84100 ASSAY OF PHOSPHORUS: CPT

## 2022-04-14 PROCEDURE — 99223 1ST HOSP IP/OBS HIGH 75: CPT | Performed by: INTERNAL MEDICINE

## 2022-04-14 PROCEDURE — 6370000000 HC RX 637 (ALT 250 FOR IP): Performed by: NURSE PRACTITIONER

## 2022-04-14 PROCEDURE — 6360000002 HC RX W HCPCS: Performed by: STUDENT IN AN ORGANIZED HEALTH CARE EDUCATION/TRAINING PROGRAM

## 2022-04-14 PROCEDURE — 85018 HEMOGLOBIN: CPT

## 2022-04-14 PROCEDURE — 83615 LACTATE (LD) (LDH) ENZYME: CPT

## 2022-04-14 PROCEDURE — 6360000002 HC RX W HCPCS

## 2022-04-14 PROCEDURE — 82042 OTHER SOURCE ALBUMIN QUAN EA: CPT

## 2022-04-14 PROCEDURE — 85027 COMPLETE CBC AUTOMATED: CPT

## 2022-04-14 PROCEDURE — 82272 OCCULT BLD FECES 1-3 TESTS: CPT

## 2022-04-14 PROCEDURE — 6360000002 HC RX W HCPCS: Performed by: NURSE PRACTITIONER

## 2022-04-14 PROCEDURE — 49083 ABD PARACENTESIS W/IMAGING: CPT

## 2022-04-14 PROCEDURE — 87086 URINE CULTURE/COLONY COUNT: CPT

## 2022-04-14 PROCEDURE — 99232 SBSQ HOSP IP/OBS MODERATE 35: CPT | Performed by: INTERNAL MEDICINE

## 2022-04-14 PROCEDURE — 36415 COLL VENOUS BLD VENIPUNCTURE: CPT

## 2022-04-14 PROCEDURE — 87205 SMEAR GRAM STAIN: CPT

## 2022-04-14 PROCEDURE — 88112 CYTOPATH CELL ENHANCE TECH: CPT

## 2022-04-14 PROCEDURE — 2580000003 HC RX 258: Performed by: STUDENT IN AN ORGANIZED HEALTH CARE EDUCATION/TRAINING PROGRAM

## 2022-04-14 PROCEDURE — 85014 HEMATOCRIT: CPT

## 2022-04-14 PROCEDURE — 83935 ASSAY OF URINE OSMOLALITY: CPT

## 2022-04-14 PROCEDURE — 86900 BLOOD TYPING SEROLOGIC ABO: CPT

## 2022-04-14 PROCEDURE — 83735 ASSAY OF MAGNESIUM: CPT

## 2022-04-14 PROCEDURE — C9113 INJ PANTOPRAZOLE SODIUM, VIA: HCPCS

## 2022-04-14 PROCEDURE — 84540 ASSAY OF URINE/UREA-N: CPT

## 2022-04-14 PROCEDURE — 87635 SARS-COV-2 COVID-19 AMP PRB: CPT

## 2022-04-14 PROCEDURE — 82150 ASSAY OF AMYLASE: CPT

## 2022-04-14 PROCEDURE — 82945 GLUCOSE OTHER FLUID: CPT

## 2022-04-14 PROCEDURE — 2000000000 HC ICU R&B

## 2022-04-14 PROCEDURE — 82570 ASSAY OF URINE CREATININE: CPT

## 2022-04-14 PROCEDURE — 6370000000 HC RX 637 (ALT 250 FOR IP)

## 2022-04-14 PROCEDURE — 84484 ASSAY OF TROPONIN QUANT: CPT

## 2022-04-14 PROCEDURE — P9016 RBC LEUKOCYTES REDUCED: HCPCS

## 2022-04-14 PROCEDURE — 2580000003 HC RX 258

## 2022-04-14 PROCEDURE — 87070 CULTURE OTHR SPECIMN AEROBIC: CPT

## 2022-04-14 PROCEDURE — 84295 ASSAY OF SERUM SODIUM: CPT

## 2022-04-14 PROCEDURE — P9017 PLASMA 1 DONOR FRZ W/IN 8 HR: HCPCS

## 2022-04-14 PROCEDURE — 88305 TISSUE EXAM BY PATHOLOGIST: CPT

## 2022-04-14 PROCEDURE — 80048 BASIC METABOLIC PNL TOTAL CA: CPT

## 2022-04-14 PROCEDURE — 87075 CULTR BACTERIA EXCEPT BLOOD: CPT

## 2022-04-14 RX ORDER — SODIUM CHLORIDE 9 MG/ML
INJECTION, SOLUTION INTRAVENOUS PRN
Status: DISCONTINUED | OUTPATIENT
Start: 2022-04-14 | End: 2022-04-16 | Stop reason: SDUPTHER

## 2022-04-14 RX ORDER — ALBUMIN (HUMAN) 12.5 G/50ML
50 SOLUTION INTRAVENOUS ONCE
Status: COMPLETED | OUTPATIENT
Start: 2022-04-14 | End: 2022-04-14

## 2022-04-14 RX ORDER — MAGNESIUM SULFATE 1 G/100ML
1000 INJECTION INTRAVENOUS ONCE
Status: COMPLETED | OUTPATIENT
Start: 2022-04-14 | End: 2022-04-14

## 2022-04-14 RX ORDER — FUROSEMIDE 10 MG/ML
20 INJECTION INTRAMUSCULAR; INTRAVENOUS ONCE
Status: COMPLETED | OUTPATIENT
Start: 2022-04-14 | End: 2022-04-14

## 2022-04-14 RX ORDER — DEXTROSE AND SODIUM CHLORIDE 5; .9 G/100ML; G/100ML
INJECTION, SOLUTION INTRAVENOUS CONTINUOUS
Status: DISCONTINUED | OUTPATIENT
Start: 2022-04-14 | End: 2022-04-18

## 2022-04-14 RX ORDER — SODIUM CHLORIDE 9 MG/ML
INJECTION, SOLUTION INTRAVENOUS CONTINUOUS
Status: DISCONTINUED | OUTPATIENT
Start: 2022-04-14 | End: 2022-04-14

## 2022-04-14 RX ORDER — MAGNESIUM SULFATE 1 G/100ML
1000 INJECTION INTRAVENOUS PRN
Status: DISCONTINUED | OUTPATIENT
Start: 2022-04-14 | End: 2022-04-21 | Stop reason: HOSPADM

## 2022-04-14 RX ADMIN — ONDANSETRON 4 MG: 2 INJECTION INTRAMUSCULAR; INTRAVENOUS at 01:17

## 2022-04-14 RX ADMIN — OCTREOTIDE ACETATE 50 MCG/HR: 500 INJECTION, SOLUTION INTRAVENOUS; SUBCUTANEOUS at 14:05

## 2022-04-14 RX ADMIN — PANTOPRAZOLE SODIUM 8 MG/HR: 40 INJECTION, POWDER, FOR SOLUTION INTRAVENOUS at 15:08

## 2022-04-14 RX ADMIN — SODIUM CHLORIDE, PRESERVATIVE FREE 10 ML: 5 INJECTION INTRAVENOUS at 19:45

## 2022-04-14 RX ADMIN — IRON SUCROSE 200 MG: 20 INJECTION, SOLUTION INTRAVENOUS at 20:36

## 2022-04-14 RX ADMIN — DEXTROSE AND SODIUM CHLORIDE: 5; 900 INJECTION, SOLUTION INTRAVENOUS at 14:06

## 2022-04-14 RX ADMIN — SODIUM CHLORIDE, PRESERVATIVE FREE 10 ML: 5 INJECTION INTRAVENOUS at 19:46

## 2022-04-14 RX ADMIN — ALBUMIN (HUMAN) 50 G: 0.25 INJECTION, SOLUTION INTRAVENOUS at 15:45

## 2022-04-14 RX ADMIN — MAGNESIUM SULFATE HEPTAHYDRATE 1000 MG: 1 INJECTION, SOLUTION INTRAVENOUS at 19:29

## 2022-04-14 RX ADMIN — OCTREOTIDE ACETATE 50 MCG/HR: 500 INJECTION, SOLUTION INTRAVENOUS; SUBCUTANEOUS at 21:50

## 2022-04-14 RX ADMIN — CEFTRIAXONE SODIUM 2000 MG: 2 INJECTION, POWDER, FOR SOLUTION INTRAMUSCULAR; INTRAVENOUS at 16:53

## 2022-04-14 RX ADMIN — FUROSEMIDE 20 MG: 10 INJECTION, SOLUTION INTRAMUSCULAR; INTRAVENOUS at 14:06

## 2022-04-14 RX ADMIN — DEXTROSE AND SODIUM CHLORIDE: 5; 900 INJECTION, SOLUTION INTRAVENOUS at 19:54

## 2022-04-14 RX ADMIN — PANTOPRAZOLE SODIUM 8 MG/HR: 40 INJECTION, POWDER, FOR SOLUTION INTRAVENOUS at 02:38

## 2022-04-14 RX ADMIN — FLUOXETINE HYDROCHLORIDE 20 MG: 20 CAPSULE ORAL at 08:28

## 2022-04-14 RX ADMIN — PANTOPRAZOLE SODIUM 8 MG/HR: 40 INJECTION, POWDER, FOR SOLUTION INTRAVENOUS at 22:41

## 2022-04-14 RX ADMIN — RIFAXIMIN 550 MG: 550 TABLET ORAL at 08:27

## 2022-04-14 RX ADMIN — LACTULOSE 20 G: 20 SOLUTION ORAL at 08:28

## 2022-04-14 ASSESSMENT — ENCOUNTER SYMPTOMS
SHORTNESS OF BREATH: 0
ABDOMINAL PAIN: 0

## 2022-04-14 ASSESSMENT — PAIN SCALES - GENERAL
PAINLEVEL_OUTOF10: 0
PAINLEVEL_OUTOF10: 0

## 2022-04-14 NOTE — CARE COORDINATION
CASE MANAGEMENT NOTE:    Admission Date:  4/13/2022 Ashley Hammond is a 57 y.o.  male    Admitted for : Severe anemia [D64.9]  Anemia, unspecified type [D64.9]    Met with:  Patient    PCP:  Herrera Romano                                Insurance:  [facility] Medicare      Is patient alert and oriented at time of discussion:  Yes    Current Residence/ Living Arrangements:  independently at home             Current Services PTA:  Yes, [facility]    Does patient go to outpatient dialysis: No  If yes, location and chair time: NA    Is patient agreeable to VNS: Yes    Freedom of choice provided:  Yes    List of [address] provided: No    VNS chosen:  Yes, Estefanía, per Pt. request    DME:  walker    Home Oxygen: No    Nebulizer: No    CPAP/BIPAP: No    Supplier: N/A    Potential Assistance Needed: Yes, VNS, May need SNF    SNF needed: Will follow, Daisha Braswell 57 of choice and list provided: No, Has been to [facility] in past. LSW notified, to follow    Pharmacy:  [facility]       Does Patient want to use MEDS to BEDS? No    Is patient currently receiving oral anticoagulation therapy? No    Is the Patient an [facility] with Readmission Risk Score greater than 14%? No  If yes, pt needs a follow up appointment made within 7 days. Family Members/Caregivers that pt would like involved in their care:    Yes    If yes, list name here:  X Wife, [address]    Transportation Provider:  Patient/X Wife, [address]             Discharge Plan:  4/14/22 [facility] Medicare Pt. Lives alone in 1 story home. DME- walker. Wants VNS, Pura José notified. Current at [address], for Epidural Back Injections. May need SNF, Has been to [facility], [facility] notified to follow for Deepthi, PT/OT, HGB on admit 3.5/5.7 today. , IV Rocephin, Sandostatin/Protonix GTT, GI. Had Paracentesis 4/5, had 7.5 L removed.  Tien will need signed/ completed, Will follow//KB                 Electronically signed by: Shilpi Falk RN on 4/14/2022 at 11:23 AM

## 2022-04-14 NOTE — CARE COORDINATION
MARGARITA sent referral to [address] [place] [address] [address] for if SNF placement is needed.

## 2022-04-14 NOTE — PLAN OF CARE
Called by primary rn that pt's repeat hgb is 6.9, pt had another bloody bm per rn, and had run of v tach. D/w dr Hansel Lazo, he is requesting that the pt be kept npo and moved to icu, will order another unit of blood and unit of ffp. Informed rn to notify primary service of pt status and dr Nguyen Running request. . Rita Fernando, APRN - CNP

## 2022-04-14 NOTE — PROGRESS NOTES
Lab notified of need to redraw h/h d/t the last level being drawn while pts blood was still infusing per night shift RN. 3160: RN touched base with lab. They reportedly just sander h/h. Beti Monroe has given orders to transfuse another unit if hgb is less than 7.  Electronically signed by Tori Young RN on 4/14/2022 at 9:51 AM

## 2022-04-14 NOTE — PROGRESS NOTES
RN received call from Samra Cannon NP and Dr. Vina Bernheim. They informed RN that they would like pt to be transferred to ICU. Would also like a unit of FFP. Blood can be infused first. RN updated family and residents.

## 2022-04-14 NOTE — PROGRESS NOTES
[facility]    Gastroenterology Daily Progress Note      Patient:   Nydia Simon   :       Facility:   [facility]  Date:     2022  Consultant:   MASSIMO Shah CNP, CNP      SUBJECTIVE  57 y.o. male admitted 2022 with Severe anemia [D64.9]  Anemia, unspecified type [D64.9] and seen for cirrhosis and anemia. The pt was seen and examined. Pt alert and has no c/o abdominal pain. Repeat hgb is pending this am. RN reports that pt had a bloody BM today. Creat trending down. Paracentesis with labs ordered.          OBJECTIVE  Scheduled Meds:   sodium chloride flush  5-40 mL IntraVENous 2 times per day    hydrOXYzine  10 mg Oral Once    atorvastatin  20 mg Oral Nightly    FLUoxetine  20 mg Oral Daily    sodium chloride flush  5-40 mL IntraVENous BID    rifaximin  550 mg Oral BID    lactulose  20 g Oral TID    cefTRIAXone (ROCEPHIN) IV  2,000 mg IntraVENous Q24H    iron sucrose  200 mg IntraVENous Q24H       Vital Signs:  BP (!) 104/47   Pulse 99   Temp 98 °F (36.7 °C)   Resp 16   SpO2 97%      Physical Exam:     General Appearance: alert and oriented to person, place and time, poor historian well-developed and well-nourished, in no acute distress  Skin: warm and dry, no rash or erythema pt is pale  Head: normocephalic and atraumatic  Eyes: pupils equal, round, and reactive to light, extraocular eye movements intact, conjunctivae normal  ENT: hearing grossly normal bilaterally  Neck: neck supple and non tender without mass, no thyromegaly or thyroid nodules, no cervical lymphadenopathy   Pulmonary/Chest: clear to auscultation bilaterally- no wheezes, rales or rhonchi, normal air movement, no respiratory distress  Cardiovascular: normal rate, regular rhythm, normal S1 and S2, no murmurs, rubs, clicks or gallops, distal pulses intact, no carotid bruits  Abdomen: soft,  non tender, distended, normal bowel sounds, no masses or organomegaly positive for ascites  Extremities: no cyanosis, clubbing plus 1  Edema to both lower legs  Musculoskeletal: normal range of motion, no joint swelling, deformity or tenderness  Neurologic:mild encephalopathy,  no cranial nerve deficit and muscle strength normal pt has asterixis      Lab and Imaging Review     CBC  Recent Labs     04/13/22  1020 04/13/22  1020 04/13/22  1105 04/13/22  1105 04/13/22  1853 04/14/22  0119 04/14/22  0434   WBC 14.2*  --  14.2*  --   --   --  16.4*   HGB 3.6*   < > 3.5*   < > 4.3* 4.7* 5.7*   HCT 12.9*   < > 12.6*   < > 14.0* 15.0* 18.1*   MCV 71.4*  --  71.0*  --   --   --  76.0*     --  370  --   --   --  173    < > = values in this interval not displayed. BMP  Recent Labs     04/13/22  1020 04/14/22  0434   * 122*   K 5.2 5.2   CL 80* 88*   CO2 20 25   BUN 36* 41*   CREATININE 1.42* 1.32*   GLUCOSE 111* 99   CALCIUM 9.2 8.6       LFTS  Recent Labs     04/13/22  1020   ALKPHOS 134*   ALT 20   AST 33   PROT 5.2*   BILITOT 2.40*   LABALBU 2.9*       AMYLASE/LIPASE/AMMONIA  Recent Labs     04/13/22  1630   AMMONIA 32       PT/INR  Recent Labs     04/13/22  1020   PROTIME 18.3*   INR 1.5         ANEMIA STUDIES  Recent Labs     04/13/22  1537   LABIRON 28   TIBC 280   FERRITIN 20*   Results for Trisha Terry (MRN [id number]) as of 4/14/2022 15:15   Ref. Range 4/13/2022 15:37   AFP (Alpha Fetoprotein) Latest Ref Range: <8.4 ug/L 2.5         ASSESSMENT/plan  1. Cirrhosis secondary to etoh abuse (pt is still drinking) and hepatitis c (treated) with ascites, mentation improved today  -continue lactulose and xifaxin  -2gm low salt diet  -paracentesis with labs ordered, albumin ordered, to remove only up to 5 liters  -recc holding diuretics until creat improved  -avoid sedatives and narcotics      2.severe anemia with bloody stool this am  -d/w md will plan for egd tomorrow  -ppi drip and will add sandostatin  -continue rocephin to decrease risk of sbp  -trend hh    3.hx esophageal cancer,

## 2022-04-14 NOTE — PROGRESS NOTES
[facility]    PROGRESS NOTE             4/14/2022    9:55 AM    Name:   Heidi Estrada  MRN:     [id number]     Acct:      [de-identified]   Room:   2090/2090-01   Day:  1  Admit Date:  4/13/2022 10:10 AM    PCP:  VASU Riley  Code Status:  Full Code    Subjective:     C/C:   Chief Complaint   Patient presents with    Shortness of Breath       Upon entry into the patient's room, he requested assistance with getting to the restroom as he felt he needed to have a bowel movement. Did not answer very many questions as PCA came to room to assist during encounter. States he generally feels better than yesterday denies any chest pain, abdominal pain, fevers, chills. When I went to check on him again after some time, patient was still in the restroom and requested I return later. Brief History:     The patient is a 57 y.o. Non- / non  male with PMH of alcoholic liver disease/cirrhosis, history of hepatitis C (treated). He is a former smoker with 50+ pack year history, and admits to former heavy alcohol use. Denies illicit or IV drug use. Patient also has a history of GE junction adenocarcinoma s/p chemoradiation completed 6/9/21 and PET 7/23/21 with no recurrence, as well as most recent EGD on 1/21/22 which was negative for malignanacy. This EGD also showed severe portal hypertensive gastropathy with some oozing of blood visualized.     He presented to the emergency department today from pain management clinic where he was scheduled to have an epidural steroid injection for back pain due to spinal stenosis. However, he was unable to lie prone due to abdominal distention and shortness of breath and he was sent to the emergency department.      According to the patient, he was scheduled for therapeutic paracentesis today at [facility] this afternoon. His most recent paracentesis was 4/5/2022 with 7.5 L removed.   Per patient, he states that it accumulated quickly afterwards and came back \"worse. \"  This was his second time having paracentesis done; previous paracentesis done in 2016. He states that he has not been taking his spironolactone for the past few days, as he felt that it was making his blood pressure too low. He reports that he took all other home medications, though.     Currently, patient states he has some shortness of breath which she attributes to abdominal distention, as well as nausea. However, denies  fever, chills, chest pain, abdominal pain, hematuria, or dark/bloody stool. Last bowel movement was yesterday     In the emergency department, hemoglobin was found to be 3.6, recheck 3.5. Sodium 120. Creatinine 1.42, baseline within normal limits. Total bili 2.4. INR 1.5.       He was given a dose of Protonix and 1 unit PRBCs in the ED.     He is being admitted to the hospital for the management of severe anemia and sepsis. 4/13: pt received 3 units PRBCs, venofer added per heme/onc    4/14: on unit 4 of PRBCs, hgb following 3 units 5.7, FOBT positive    Review of Systems:     Review of Systems   Reason unable to perform ROS: patient eager to get help to get to restroom. Constitutional: Negative for chills and fever. Respiratory: Negative for shortness of breath. Cardiovascular: Negative for chest pain. Gastrointestinal: Negative for abdominal pain. Medications: Allergies:     Allergies   Allergen Reactions    Bee Pollen Other (See Comments)     Runny nose, watery eyes    Pollen Extract      Runny nose, watery eyes       Current Meds:   Scheduled Meds:    sodium chloride flush  5-40 mL IntraVENous 2 times per day    hydrOXYzine  10 mg Oral Once    atorvastatin  20 mg Oral Nightly    FLUoxetine  20 mg Oral Daily    sodium chloride flush  5-40 mL IntraVENous BID    rifaximin  550 mg Oral BID    lactulose  20 g Oral TID    cefTRIAXone (ROCEPHIN) IV  2,000 mg IntraVENous Q24H    iron sucrose  200 mg IntraVENous Q24H     Continuous Infusions:    sodium chloride      octreotide (SandoSTATIN) infusion      sodium chloride      sodium chloride      dextrose 5 % and 0.9 % NaCl 50 mL/hr at 22 1819    pantoprazole 8 mg/hr (22 0534)    sodium chloride       PRN Meds: sodium chloride, sodium chloride, sodium chloride flush, sodium chloride, ondansetron **OR** ondansetron, polyethylene glycol, midodrine, sodium chloride, oxyCODONE-acetaminophen    Data:     Past Medical History:   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 ([facility] [address] Anxiety, Arthritis, Back pain, chronic, Lezama esophagus, BPH (benign prostatic hypertrophy), Cholelithiasis, Cirrhosis ([facility] [address] COVID-19, COVID-19 vaccine series completed, DDD (degenerative disc disease), lumbar, Depression, Esophageal cancer ([facility] Esophageal varices ([facility] Fatty liver, GERD (gastroesophageal reflux disease), Hep C w/o coma, chronic ([facility] History of alcohol abuse, History of blood transfusion, History of colon polyps, History of tobacco abuse, [place] (hard of hearing), Hyperlipidemia, Hypertension, Port-A-Cath in place, Sciatica, Spinal stenosis, Stomach ulcer, Tubular adenoma of colon, Vitamin D deficiency, and Wears glasses. Social History:   reports that he quit smoking about 5 years ago. He has a 45.00 pack-year smoking history. He has never used smokeless tobacco. He reports previous alcohol use. He reports previous drug use. Frequency: 1.00 time per week. Family History:   Family History   Problem Relation Age of Onset   Radha Ni Cancer Mother         pancreatic    Cancer Father         bone    Diabetes Sister     Asthma Brother        Vitals:  /66   Pulse 89   Temp 98.1 °F (36.7 °C) (Axillary)   Resp 20   SpO2 98%   Temp (24hrs), Av.8 °F (36.6 °C), Min:97.3 °F (36.3 °C), Max:98.3 °F (36.8 °C)    No results for input(s): POCGLU in the last 72 hours. I/O(24Hr):     Intake/Output Summary (Last 24 hours) at 4/14/2022 0955  Last data filed at 4/14/2022 0732  Gross per 24 hour   Intake --   Output 690 ml   Net -690 ml       Labs:    CBC with Differential:    Lab Results   Component Value Date    WBC 16.4 04/14/2022    RBC 2.38 04/14/2022    RBC 4.75 04/19/2012    HGB 5.7 04/14/2022    HCT 18.1 04/14/2022     04/14/2022     04/19/2012    MCV 76.0 04/14/2022    MCH 24.1 04/14/2022    MCHC 31.7 04/14/2022    RDW 20.0 04/14/2022    NRBC 1 04/13/2022    LYMPHOPCT 2 04/13/2022    MONOPCT 7 04/13/2022    MYELOPCT 1 06/01/2021    BASOPCT 0 04/13/2022    MONOSABS 0.99 04/13/2022    LYMPHSABS 0.28 04/13/2022    EOSABS 0.00 04/13/2022    BASOSABS 0.00 04/13/2022    DIFFTYPE NOT REPORTED 02/02/2022     Hemoglobin/Hematocrit:    Lab Results   Component Value Date    HGB 5.7 04/14/2022    HCT 18.1 04/14/2022     BMP:    Lab Results   Component Value Date     04/14/2022    K 5.2 04/14/2022    CL 88 04/14/2022    CO2 25 04/14/2022    BUN 41 04/14/2022    LABALBU 2.9 04/13/2022    LABALBU 4.7 04/19/2012    CREATININE 1.32 04/14/2022    CALCIUM 8.6 04/14/2022    GFRAA >60 04/14/2022    LABGLOM 55 04/14/2022    GLUCOSE 99 04/14/2022    GLUCOSE 65 04/19/2012     U/A:    Lab Results   Component Value Date    NITRITE neg 05/05/2017    COLORU Orange 04/14/2022    PROTEINU NEGATIVE 04/14/2022    PHUR 5.5 04/14/2022    WBCUA 0 TO 2 04/14/2022    RBCUA 0 TO 2 04/14/2022    MUCUS NOT REPORTED 12/21/2021    TRICHOMONAS NOT REPORTED 12/21/2021    YEAST NOT REPORTED 12/21/2021    BACTERIA None 04/14/2022    CLARITYU clear 05/05/2017    SPECGRAV 1.014 04/14/2022    LEUKOCYTESUR TRACE 04/14/2022    UROBILINOGEN Normal 04/14/2022    BILIRUBINUR SMALL 04/14/2022    BILIRUBINUR - 05/05/2017    BILIRUBINUR NEGATIVE 04/19/2012    BLOODU neg 05/05/2017    GLUCOSEU NEGATIVE 04/14/2022    GLUCOSEU NEGATIVE 04/19/2012    AMORPHOUS 1+ 04/14/2022         Radiology:    IR US GUIDED PARACENTESIS    Result Date: 4/5/2022  PROCEDURE: IR US ([facility] [C15.9]     Former smoker, 50+ pack years, quit 2016 [Z87.891] 12/20/2021    Spinal stenosis of lumbar region with neurogenic claudication [M48.062] 12/14/2021    Malignant neoplasm of lower third of esophagus ([facility] [C15.5]     Hepatitis C virus infection resolved after antiviral drug therapy [Z86.19] 12/23/2020    Hepatic cirrhosis ([facility] [K74.60] 09/15/2016    GERD (gastroesophageal reflux disease) [K21.9] 04/19/2012       Plan:        Severe anemia in the setting of cirrhosis and portal hypertension, likely secondary to GI bleed   -Hemoglobin 3.5 on admission, 5.7 this morning  -Has received 3 units of blood, so far, currently receiving 4th  -H&H every 6 hours  -Continue to transfuse PRBCs until hgb >7  -Protonix drip   -Most recent EGD in January showed portal hypertensive gastropathy with some oozing blood noted  -FOBT positive  -GI is following     Sepsis, source unknown  -Tachycardia and WBCs 14.2 on admission, increased to 16.4 this morning  -SBP in 100s, midodrine 5mg TIID PRN for SBP <110  -Urinalysis positive for nitrites and leukocyte esterase, no hgb  -blood cultures and urine culture pending  -Lactic acid 2.0  -IV rocephin for UTI and SBP ppx, as well as rifaximin     Hyponatremia  -Sodium 120 on admission, 122 this AM  -Pt is not chronically hyponatremic  -Patient is likely intravascularly volume depleted 2/2 third spacing from ascites  -IV fluid hydration with D5 NS at 50cc/h  -Patient reportedly takes Lasix and spironolactone at home, but has not taken spironolactone for a few days prior to admission; urine urea, urine creatinine, urine osmolality, serum osmolality pending     Ascites 2/2 cirrhotic liver disease  -cirrhosis 2/2 heavy alcohol use in the past; patient also has history of hepatitis C s/p treatment  -Does not appear encephalopathic at this time  -Patient had IR guided paracentesis on 4/5/2022 and had rapid reaccumulation of ascitic fluid during this time; was scheduled for paracentesis on day of admission  -Leukocytosis of 14.2 on admission, concerning for possible SBP  -IV Rocephin for SBP prophylaxis, as well as rifaximin  -GI is following; IR consulted for paracentesis once hemoglobin stable  -MELD score 27, Child-Laguna score 8      Portal hypertension, held home lasix, nadolol, and spironolactone d/t hypotension     History of GE junction adenocarcinoma, in remission  -pt completed chemoradiation 6/9/2021; PET scan 7/23/21 showed no recurrence  -Most recent EGD done 1/21/22: flat 3-4mm polyp at GE junction which showed no malignancy     GI PPx: Protonix drip  DVT prophylaxis: SCD cuffs, no chemical anticoagulation at this time due to severe anemia and possible active bleed  Diet: MABEL Alston MD  4/14/2022  9:55 AM     I have discussed the care of Ana Maria Dennis , including pertinent history and exam findings,    today with the resident. I have seen and examined the patient and the key elements of all parts of the encounter have been performed by me . I agree with the assessment, plan and orders as documented by the resident. Principal Problem:    Anemia  Active Problems:    GERD (gastroesophageal reflux disease)    Hepatic cirrhosis (HCC)    Hepatitis C virus infection resolved after antiviral drug therapy    Malignant neoplasm of lower third of esophagus (HCC)    Spinal stenosis of lumbar region with neurogenic claudication    Former smoker, 50+ pack years, quit 2016    Esophageal adenocarcinoma ([address]    Acute kidney injury ([facility] [address]  Resolved Problems:    * No resolved hospital problems. *        Overall  course ;                                   are improving over time.         Patient multiple medical problem which include cirrhosis of liver, malignancy GE junction, finished chemoradiation  Admitted with anemia, stool for occult blood is positive  GI bleed  History of portal hypertension, on Protonix and octreotide drip  Checking H&H every 6  Patient has hyponatremia, ROBYN  Likely hypervolumic  hyponatremia, will give Lasix 20 mg IV once we will monitor sodium every 6  Patient is kept n.p.o., will continue with dextrose normal saline for nutrition  Overall prognosis is very is poor  Will need EGD  If sodium worsen, will request nephro consult  ROBYN, may be due to abdominal compartment syndrome because of ascites  Will need IR guided paracentesis          Electronically signed by Amber Irizarry MD

## 2022-04-14 NOTE — PROGRESS NOTES
Patient to 2007 from PCU. Patient placed on bedside cardiac monitor. Patient currently has PRBCs infusing. Patient alert and oriented. Denies further questions at this time.

## 2022-04-14 NOTE — PLAN OF CARE
Problem: Activity:  Goal: Fatigue will decrease  Description: Fatigue will decrease  Outcome: Ongoing    Goal: Ability to tolerate increased activity will improve  Description: Ability to tolerate increased activity will improve  Outcome: Ongoing     Problem:  Bowel/Gastric:  Goal: Ability to achieve a regular elimination pattern will improve  Description: Ability to achieve a regular elimination pattern will improve  Outcome: Ongoing     Problem: Cardiac:  Goal: Ability to maintain an adequate cardiac output will improve  Description: Ability to maintain an adequate cardiac output will improve  Outcome: Ongoing     Problem: Coping:  Goal: Ability to adjust to condition or change in health will improve  Description: Ability to adjust to condition or change in health will improve  Outcome: Ongoing     Problem: Health Behavior:  Goal: Identification of resources available to assist in meeting health care needs will improve  Description: Identification of resources available to assist in meeting health care needs will improve  Outcome: Ongoing     Problem: Safety:  Goal: Ability to remain free from injury will improve  Description: Ability to remain free from injury will improve  Outcome: Ongoing

## 2022-04-14 NOTE — PROGRESS NOTES
RN notified resident team that pt had a 7 beat run of v-tach during paracentesis today. EKG, troponin, mag, phos levels ordered. Phos wnl  Magnesium 1.8, will replace to keep above 2  Troponin unremarkable  EKG showed sinus rhythm with fusion complexes, nonspecific t-wave abnormalities, prolonged QT. Patient seen at bedside. Denies chest pain, palpitations. States his shortness of breath and abdominal distension have improved. Is requesting something to eat, since his EGD will likely not occur until tomorrow. Patient should remain NPO per GI recommendation until at least after the procedure tomorrow.        Electronically signed by Maria Victoria Strong MD on 4/14/2022 at 7:03 PM

## 2022-04-14 NOTE — PROGRESS NOTES
Writer informed by Mouna Alvarez that family called for update. Writer return call  from family, Kristen Gil, on contact list. Updated on patient.

## 2022-04-14 NOTE — PROGRESS NOTES
Physical Therapy        Physical Therapy Cancel Note      DATE: 2022    NAME: Petty Casarez  MRN: [id number]   : 1960      Patient not seen this date for Physical Therapy due to:    PT eval held due to critical Hemaglobin of 6.2.  Will reattempt to see pt on 4-15-22      Electronically signed by Odette Ellington PT on 2022 at 10:28 AM

## 2022-04-14 NOTE — PROGRESS NOTES
Notified Jose Miguel Kailee of hgb 6.9 after 4th unit of blood. Ordered another unit and will update Dr. Candelaria Nagy.

## 2022-04-14 NOTE — CONSULTS
Gastroenterology Consult Note      Patient: Sharonda Quiroz  : [birth year]  Acct#:  [id number]     Date:  2022    Subjective:       History of Present Illness  Patient is a 57 y.o.  male admitted with Severe anemia [D64.9]  Anemia, unspecified type [D64.9] who is seen in consult for *liver cirrhosis and anemia    57year-old gentleman  With known history of cirrhosis related to hepatitis C and alcohol although his hepatitis C has been treated in the past he does have also history of GERD he does have history of esophageal cancer and esophageal varices. Came to the emergency room complaining of significant shortness of breath  Found to be very anemic with hemoglobin of 3.5  He was scheduled to have injection in his back today for pain but he was not able to lay in the prone position was very short of breath for which she was sent to the emergency room    As mentioned the hemoglobin was found to be 3.5 is iron deficient is on iron at home  He denied any sign of bleeding including hematemesis hematochezia or melena  He denied any anticoagulation or NSAIDs use    He did admit to some epigastric pain  He was supposed to be on diuretics but he is not able to take them because of low blood pressure  He is not taking his lactulose either  But he did have a bowel movement today which was normal in color  The patient currently evaluated at the [facility] for transplant  His hepatitis C is being treated  He does have TIPS  His creatinine is 1.42  Sodium of 120  White blood count 14.2  INR 1.5  Bilirubin 2.4  Alkaline phosphatase 134    No recent alpha-fetoprotein but it was normal 2 years ago    Ultrasound from 2021 no mass, positive cirrhosis with cholelithiasis.   Paracentesis on 2022 removed 7 and half liters negative for SBP    Currently denied any fever or chills denied any other symptoms       Endoscopy 22 egd (pangular)  Severe portal hypertensive gastropathy.  At the previous biopsy site at the GE junction there appears to be questionable plaque polyp 2 to 3 mm seen biopsy taken-negative for malignancy.  No ulcer disease noted. 3/30/18 colonoscopy (pangular) sigmoid polyp removed-tubular adenoma      Past Medical History:   Diagnosis Date    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 Sonia Bloom, orthopedic, every 3-4 months, gets steroid injection    Lezama esophagus     BPH (benign prostatic hypertrophy)     Cholelithiasis     Cirrhosis ([address]     COVID-19 12/2020    pt reports he had a positive test while at [facility] in 2020, was asymptomatic    COVID-19 vaccine series completed 5/20/2021, 6/22/2021    Moderna 5/20/2021, 6/22/2021    DDD (degenerative disc disease), lumbar     Depression     Esophageal cancer ([address]     INVASIVE ADENOCARCINOMA ARISING IN TUBULAR ADENOMA WITH HIGH GRADE DYSPLASIA, ASSOCIATED WITH FOCAL INTESTINAL METAPLASIA     Esophageal varices ([address]     Fatty liver     GERD (gastroesophageal reflux disease)     Hep C w/o coma, chronic ([address]     History of alcohol abuse     6-12 beers a day; quit drinking July 2016    History of blood transfusion     History of colon polyps 2016    History of tobacco abuse     Telida (hard of hearing)     Hyperlipidemia     Hypertension     Port-A-Cath in place     right upper chest    Sciatica     Spinal stenosis     Stomach ulcer     hx of    Tubular adenoma of colon 2016, 2018    Vitamin D deficiency     Wears glasses       Past Surgical History:   Procedure Laterality Date    BUNIONECTOMY      twice on right side    BUNIONECTOMY Left     CARPAL TUNNEL RELEASE Right     COLONOSCOPY      at age 35    COLONOSCOPY  10/05/2016    polyps-pathology tubular adenoma, and abnormal looking mucosa right colon-pathology-tubular adenoma    COLONOSCOPY N/A 3/30/2018    COLONOSCOPY POLYPECTOMY COLD BIOPSY performed by Tabatha Albarran MD at [facility] [employer] COLONOSCOPY  03/30/2018    Small polyp in the sigmoid colon and excised with biopsy forceps--tubular adenoma    ENDOSCOPY, COLON, DIAGNOSTIC      EGD    IR PORT PLACEMENT EQUAL OR GREATER THAN 5 YEARS  4/19/2021    IR PORT PLACEMENT EQUAL OR GREATER THAN 5 YEARS 4/19/2021 [facility]    KNEE SURGERY Left     cyst removed    NASAL SEPTUM SURGERY      NERVE BLOCK Right 11/23/2020    NERVE BLOCK RIGHT CERVICAL STEROID INJECTION  C3-C6 performed by Jena Hobbs MD at [address]  01/04/16    steroid injection C7 T1    OTHER SURGICAL HISTORY  11/21/2016    Bilateral Lumbar CACHORRO L4-L5 injections    OTHER SURGICAL HISTORY  12/19/2016    lumbar steroid injection    OTHER SURGICAL HISTORY  09/28/2018    BILATERAL L5 CACHORRO (N/A Back)    OTHER SURGICAL HISTORY Right 11/23/2020    cervical injection    PAIN MANAGEMENT PROCEDURE Left 7/9/2020    EPIDURAL STEROID INJECTION LEFT L4 L5 performed by Jena Hobbs MD at [address] Left 7/20/2020    LEFT L4 L5 EPIDURAL STEROID INJECTION performed by Jena Hobbs MD at [address] Bilateral 8/17/2020    LUMBAR FACET BILATERAL L2-L5 performed by Jena Hobbs MD at [facility] Bilateral 12/7/2020    NERVE BLOCK BILATERAL LUMBAR MEDIAL BRANCH L2-L5 performed by Jena Hobbs MD at [address] AA&/STRD TFRML EPI LUMBAR/SACRAL 1 LEVEL Bilateral 9/6/2018    BILATERAL L5 CACHORRO performed by Jena Hobbs MD at [address] AA&/STRD TFRML EPI LUMBAR/SACRAL 1 LEVEL N/A 9/28/2018    BILATERAL L5 CACHORRO performed by Jena Hobbs MD at [address] N/CARPAL TUNNEL SURG Right 8/29/2017    CARPAL TUNNEL RELEASE RIGHT performed by Dunia Glover MD at [address] N/CARPAL TUNNEL SURG Left 10/31/2017    CARPAL TUNNEL RELEASE performed by Dunia Glover MD at [address] [facility] N/A 12/29/2020    EGD BIOPSY performed by Juan Alberto Chung MD at [place] [address] N/A 2/2/2021    EGD BIOPSY and spot marking performed by Sandra Hernández MD at [place] [address] 2/12/2021    ENDOSCOPIC ULTRASOUND, EGD performed by Lindsay Holden MD at [address] [place] [address]  2/12/2021    EGD DIAGNOSTIC ONLY performed by Lindsay Holden MD at [address] [place] [address] N/A 8/31/2021    EGD BIOPSY performed by Sandra Hernández MD at [place] [address] N/A 1/21/2022    EGD BIOPSY performed by Sandra Hernández MD at [address] [facility]        Past Endoscopic History as above    Admission Meds  No current facility-administered medications on file prior to encounter. Current Outpatient Medications on File Prior to Encounter   Medication Sig Dispense Refill    cyclobenzaprine (FLEXERIL) 10 MG tablet Take 10 mg by mouth daily as needed for Muscle spasms      oxyCODONE-acetaminophen (PERCOCET) 5-325 MG per tablet Take 1 tablet by mouth daily as needed for Pain for up to 30 days.  30 tablet 0    furosemide (LASIX) 20 MG tablet take 1 tablet by mouth once daily 60 tablet 0    nadolol (CORGARD) 20 MG tablet Take 1 tablet by mouth daily 30 tablet 3    ferrous sulfate (IRON 325) 325 (65 Fe) MG tablet Take 1 tablet by mouth 2 times daily 60 tablet 5    omeprazole (PRILOSEC) 40 MG delayed release capsule take 1 capsule by mouth EVERY MORNING BEFORE BREAKFAST 30 capsule 2    FLUoxetine (PROZAC) 20 MG capsule take 1 capsule by mouth once daily 30 capsule 3    lidocaine-prilocaine (EMLA) 2.5-2.5 % cream Apply topically 45-60 mins prior to needle poke daily PRN 1 Tube 2    atorvastatin (LIPITOR) 20 MG tablet take 1 tablet by mouth at bedtime 90 tablet 1    spironolactone (ALDACTONE) 50 MG tablet take 1 tablet by mouth once daily 90 tablet 3       Patient   Does Use ASA, NSAID No  Allergies  Allergies   Allergen Reactions    Bee Pollen Other (See Comments)     Runny nose, watery eyes    Pollen Extract      Runny nose, watery eyes        Social   Social History     Tobacco Use    Smoking status: Former Smoker     Packs/day: 1.00     Years: 45.00     Pack years: 45.00     Quit date: 2017     Years since quittin.2    Smokeless tobacco: Never Used   Substance Use Topics    Alcohol use: Not Currently     Comment: quit 2019        PSYCH HISTORY:  Depression No  Anxiety No  Suicide No       Family History   Problem Relation Age of Onset    Cancer Mother         pancreatic    Cancer Father         bone    Diabetes Sister     Asthma Brother       No family history of colon cancer, Crohn's disease, or ulcerative colitis. Review of Systems  Constitutional: negative  Eyes: negative  Ears, nose, mouth, throat, and face: negative  Respiratory: negative  Cardiovascular: negative  Gastrointestinal: negative  Genitourinary:negative  Integument/breast: negative  Hematologic/lymphatic: negative  Musculoskeletal:negative  Endocrine: negative           Physical Exam  Blood pressure (!) 102/58, pulse 88, temperature 98.2 °F (36.8 °C), temperature source Oral, resp. rate 16, SpO2 100 %.          General Appearance: alert and oriented to person, place and time, well-developed and well-nourished, in no acute distress  Skin: warm and dry, no rash or erythema  Head: normocephalic and atraumatic  Eyes: pupils equal, round, and reactive to light, extraocular eye movements intact, conjunctivae normal  ENT: hearing grossly normal bilaterally  Neck: neck supple and non tender without mass, no thyromegaly or thyroid nodules, no cervical lymphadenopathy   Pulmonary/Chest: clear to auscultation bilaterally- no wheezes, rales or rhonchi, normal air movement, no respiratory distress  Cardiovascular: normal rate, regular rhythm, normal S1 and S2, no murmurs, rubs, clicks or gallops, distal pulses intact, no carotid bruits  Abdomen: soft, non-tender, distended with ascites, normal bowel sounds, no masses or organomegaly  Extremities: no cyanosis, clubbing or edema  Musculoskeletal: normal range of motion, no joint swelling, deformity or tenderness  Neurologic: no cranial nerve deficit and muscle strength normal    Data Review:    Recent Labs     04/13/22  1020 04/13/22  1105 04/13/22  1853   WBC 14.2* 14.2*  --    HGB 3.6* 3.5* 4.3*   HCT 12.9* 12.6* 14.0*   MCV 71.4* 71.0*  --     370  --      Recent Labs     04/13/22  1020   *   K 5.2   CL 80*   CO2 20   BUN 36*   CREATININE 1.42*     Recent Labs     04/13/22  1020   AST 33   ALT 20   BILITOT 2.40*   ALKPHOS 134*     No results for input(s): LIPASE, AMYLASE in the last 72 hours. Recent Labs     04/13/22  1020   PROTIME 18.3*   INR 1.5     No results for input(s): PTT in the last 72 hours. No results for input(s): OCCULTBLD in the last 72 hours. CEA:  No results found for: CEA  Ca 125:  No results found for:   Ca 19-9:    Lab Results   Component Value Date     35 01/31/2022     Ca 15-3:  No results found for:   AFP:  No components found for: AFAFP  Beta HCG:  No components found for: BHCG  Neuron Specific Enolase:  No results found for: NSE  Imaging Studies:                           All appropriate imaging studies and reports reviewed: Yes                 Assessment:     Principal Problem:    Anemia  Active Problems:    GERD (gastroesophageal reflux disease)    Hepatic cirrhosis (HCC)    Hepatitis C virus infection resolved after antiviral drug therapy    Malignant neoplasm of lower third of esophagus (HCC)    Spinal stenosis of lumbar region with neurogenic claudication    Former smoker, 50+ pack years, quit 2016    Esophageal adenocarcinoma ([address]    Acute kidney injury ([address]  Resolved Problems:    * No resolved hospital problems.  *      Cirrhosis related to hep C and alcohol  Hep C which was treated  Mild encephalopathy  Severe anemia with no overt bleeding  Hyponatremia  Elevated creatinine  Leukocytosis  Epigastric pain  History of esophageal cancer and esophageal varices      Recommendations:   PPI  Blood transfusion  Ammonia level  Alpha-fetoprotein level  Restart lactulose and Xifaxan  Paracentesis with albumin infusion and not remove more than 5 L and rule out SBP  Continue to hold the diuretics for now with the kidney function not been normal  Low-sodium diet  Oncology follow-up                      Thank you for allowing me to participate in the care of your patient. Please feel free to contact me with any questions or concerns.      Gaurav Monique MD

## 2022-04-14 NOTE — PROGRESS NOTES
Patient tolerated oaracentesis without distress. 5000 ml of yelow fluid removed. Dry dressing to site. Nurse updated.

## 2022-04-15 ENCOUNTER — APPOINTMENT (OUTPATIENT)
Dept: GENERAL RADIOLOGY | Age: 62
DRG: 378 | End: 2022-04-15
Payer: MEDICARE

## 2022-04-15 ENCOUNTER — ANESTHESIA (OUTPATIENT)
Dept: OPERATING ROOM | Age: 62
DRG: 378 | End: 2022-04-15
Payer: MEDICARE

## 2022-04-15 ENCOUNTER — ANESTHESIA EVENT (OUTPATIENT)
Dept: OPERATING ROOM | Age: 62
DRG: 378 | End: 2022-04-15
Payer: MEDICARE

## 2022-04-15 VITALS
SYSTOLIC BLOOD PRESSURE: 100 MMHG | RESPIRATION RATE: 11 BRPM | DIASTOLIC BLOOD PRESSURE: 55 MMHG | OXYGEN SATURATION: 100 %

## 2022-04-15 LAB
ABSOLUTE EOS #: 0 K/UL (ref 0–0.4)
ABSOLUTE LYMPH #: 0.16 K/UL (ref 1–4.8)
ABSOLUTE MONO #: 0.47 K/UL (ref 0.1–1.3)
ALBUMIN SERPL-MCNC: 2.9 G/DL (ref 3.5–5.2)
ALP BLD-CCNC: 90 U/L (ref 40–129)
ALT SERPL-CCNC: 17 U/L (ref 5–41)
ANION GAP SERPL CALCULATED.3IONS-SCNC: 13 MMOL/L (ref 9–17)
APPEARANCE FLUID: ABNORMAL
AST SERPL-CCNC: 34 U/L
BASO FLUID: ABNORMAL %
BASOPHILS # BLD: 0 % (ref 0–2)
BASOPHILS ABSOLUTE: 0 K/UL (ref 0–0.2)
BILIRUB SERPL-MCNC: 6.08 MG/DL (ref 0.3–1.2)
BILIRUBIN DIRECT: 2.46 MG/DL
BILIRUBIN, INDIRECT: 3.62 MG/DL (ref 0–1)
BUN BLDV-MCNC: 27 MG/DL (ref 8–23)
CALCIUM SERPL-MCNC: 8.3 MG/DL (ref 8.6–10.4)
CHLORIDE BLD-SCNC: 93 MMOL/L (ref 98–107)
CO2: 23 MMOL/L (ref 20–31)
COLOR FLUID: YELLOW
CREAT SERPL-MCNC: 0.82 MG/DL (ref 0.7–1.2)
CULTURE: NORMAL
EKG ATRIAL RATE: 90 BPM
EKG P AXIS: 34 DEGREES
EKG P-R INTERVAL: 160 MS
EKG Q-T INTERVAL: 398 MS
EKG QRS DURATION: 78 MS
EKG QTC CALCULATION (BAZETT): 513 MS
EKG R AXIS: 23 DEGREES
EKG T AXIS: -12 DEGREES
EKG VENTRICULAR RATE: 100 BPM
EOSINOPHIL FLUID: ABNORMAL %
EOSINOPHILS RELATIVE PERCENT: 0 % (ref 0–4)
FLUID DIFF COMMENT: ABNORMAL
GFR AFRICAN AMERICAN: >60 ML/MIN
GFR NON-AFRICAN AMERICAN: >60 ML/MIN
GFR SERPL CREATININE-BSD FRML MDRD: ABNORMAL ML/MIN/{1.73_M2}
GLUCOSE BLD-MCNC: 158 MG/DL (ref 70–99)
HCT VFR BLD CALC: 21 % (ref 41–53)
HCT VFR BLD CALC: 21.6 % (ref 41–53)
HCT VFR BLD CALC: 23.1 % (ref 41–53)
HCT VFR BLD CALC: 24.4 % (ref 41–53)
HCT VFR BLD CALC: 25.4 % (ref 41–53)
HEMOGLOBIN: 6.4 G/DL (ref 13.5–17.5)
HEMOGLOBIN: 6.8 G/DL (ref 13.5–17.5)
HEMOGLOBIN: 7.6 G/DL (ref 13.5–17.5)
HEMOGLOBIN: 7.8 G/DL (ref 13.5–17.5)
HEMOGLOBIN: 8.3 G/DL (ref 13.5–17.5)
LYMPHOCYTES # BLD: 2 % (ref 24–44)
LYMPHOCYTES, BODY FLUID: 9 %
MAGNESIUM: 1.9 MG/DL (ref 1.6–2.6)
MAGNESIUM: 2.1 MG/DL (ref 1.6–2.6)
MCH RBC QN AUTO: 26.2 PG (ref 26–34)
MCHC RBC AUTO-ENTMCNC: 32.8 G/DL (ref 31–37)
MCV RBC AUTO: 79.9 FL (ref 80–100)
MONOCYTE, FLUID: ABNORMAL %
MONOCYTES # BLD: 6 % (ref 1–7)
MORPHOLOGY: ABNORMAL
NEUTROPHIL, FLUID: 19 %
OTHER CELLS FLUID: 72 %
PATHOLOGIST REVIEW: NORMAL
PDW BLD-RTO: 18.6 % (ref 11.5–14.9)
PLATELET # BLD: 104 K/UL (ref 150–450)
PMV BLD AUTO: 7.2 FL (ref 6–12)
POTASSIUM SERPL-SCNC: 3.2 MMOL/L (ref 3.7–5.3)
POTASSIUM SERPL-SCNC: 3.5 MMOL/L (ref 3.7–5.3)
RBC # BLD: 2.89 M/UL (ref 4.5–5.9)
RBC FLUID: 531 /MM3
REASON FOR REJECTION: NORMAL
SEG NEUTROPHILS: 92 % (ref 36–66)
SEGMENTED NEUTROPHILS ABSOLUTE COUNT: 7.17 K/UL (ref 1.3–9.1)
SODIUM BLD-SCNC: 127 MMOL/L (ref 135–144)
SODIUM BLD-SCNC: 129 MMOL/L (ref 135–144)
SODIUM BLD-SCNC: 131 MMOL/L (ref 135–144)
SODIUM BLD-SCNC: 133 MMOL/L (ref 135–144)
SPECIMEN DESCRIPTION: NORMAL
SPECIMEN TYPE: ABNORMAL
TOTAL PROTEIN: 4.5 G/DL (ref 6.4–8.3)
WBC # BLD: 7.8 K/UL (ref 3.5–11)
WBC FLUID: 63 /MM3
ZZ NTE CLEAN UP: ORDERED TEST: NORMAL
ZZ NTE WITH NAME CLEAN UP: SPECIMEN SOURCE: NORMAL

## 2022-04-15 PROCEDURE — 97530 THERAPEUTIC ACTIVITIES: CPT

## 2022-04-15 PROCEDURE — 85018 HEMOGLOBIN: CPT

## 2022-04-15 PROCEDURE — 2580000003 HC RX 258: Performed by: INTERNAL MEDICINE

## 2022-04-15 PROCEDURE — 36415 COLL VENOUS BLD VENIPUNCTURE: CPT

## 2022-04-15 PROCEDURE — 85025 COMPLETE CBC W/AUTO DIFF WBC: CPT

## 2022-04-15 PROCEDURE — 6370000000 HC RX 637 (ALT 250 FOR IP): Performed by: INTERNAL MEDICINE

## 2022-04-15 PROCEDURE — 84132 ASSAY OF SERUM POTASSIUM: CPT

## 2022-04-15 PROCEDURE — 6360000002 HC RX W HCPCS: Performed by: INTERNAL MEDICINE

## 2022-04-15 PROCEDURE — 3700000000 HC ANESTHESIA ATTENDED CARE: Performed by: INTERNAL MEDICINE

## 2022-04-15 PROCEDURE — 7100000000 HC PACU RECOVERY - FIRST 15 MIN: Performed by: INTERNAL MEDICINE

## 2022-04-15 PROCEDURE — 2709999900 HC NON-CHARGEABLE SUPPLY: Performed by: INTERNAL MEDICINE

## 2022-04-15 PROCEDURE — 0DJ08ZZ INSPECTION OF UPPER INTESTINAL TRACT, VIA NATURAL OR ARTIFICIAL OPENING ENDOSCOPIC: ICD-10-PCS | Performed by: INTERNAL MEDICINE

## 2022-04-15 PROCEDURE — 7100000001 HC PACU RECOVERY - ADDTL 15 MIN: Performed by: INTERNAL MEDICINE

## 2022-04-15 PROCEDURE — 6370000000 HC RX 637 (ALT 250 FOR IP): Performed by: NURSE PRACTITIONER

## 2022-04-15 PROCEDURE — 80048 BASIC METABOLIC PNL TOTAL CA: CPT

## 2022-04-15 PROCEDURE — P9016 RBC LEUKOCYTES REDUCED: HCPCS

## 2022-04-15 PROCEDURE — C9113 INJ PANTOPRAZOLE SODIUM, VIA: HCPCS | Performed by: INTERNAL MEDICINE

## 2022-04-15 PROCEDURE — 2500000003 HC RX 250 WO HCPCS: Performed by: STUDENT IN AN ORGANIZED HEALTH CARE EDUCATION/TRAINING PROGRAM

## 2022-04-15 PROCEDURE — 2580000003 HC RX 258: Performed by: ANESTHESIOLOGY

## 2022-04-15 PROCEDURE — 71045 X-RAY EXAM CHEST 1 VIEW: CPT

## 2022-04-15 PROCEDURE — 3609017100 HC EGD: Performed by: INTERNAL MEDICINE

## 2022-04-15 PROCEDURE — 83735 ASSAY OF MAGNESIUM: CPT

## 2022-04-15 PROCEDURE — P9047 ALBUMIN (HUMAN), 25%, 50ML: HCPCS | Performed by: STUDENT IN AN ORGANIZED HEALTH CARE EDUCATION/TRAINING PROGRAM

## 2022-04-15 PROCEDURE — 80076 HEPATIC FUNCTION PANEL: CPT

## 2022-04-15 PROCEDURE — 99291 CRITICAL CARE FIRST HOUR: CPT | Performed by: INTERNAL MEDICINE

## 2022-04-15 PROCEDURE — 36430 TRANSFUSION BLD/BLD COMPNT: CPT

## 2022-04-15 PROCEDURE — 43235 EGD DIAGNOSTIC BRUSH WASH: CPT | Performed by: INTERNAL MEDICINE

## 2022-04-15 PROCEDURE — 6360000002 HC RX W HCPCS: Performed by: ANESTHESIOLOGY

## 2022-04-15 PROCEDURE — 93010 ELECTROCARDIOGRAM REPORT: CPT | Performed by: INTERNAL MEDICINE

## 2022-04-15 PROCEDURE — 85014 HEMATOCRIT: CPT

## 2022-04-15 PROCEDURE — 97162 PT EVAL MOD COMPLEX 30 MIN: CPT

## 2022-04-15 PROCEDURE — 99232 SBSQ HOSP IP/OBS MODERATE 35: CPT | Performed by: INTERNAL MEDICINE

## 2022-04-15 PROCEDURE — 2500000003 HC RX 250 WO HCPCS: Performed by: ANESTHESIOLOGY

## 2022-04-15 PROCEDURE — 6360000002 HC RX W HCPCS: Performed by: STUDENT IN AN ORGANIZED HEALTH CARE EDUCATION/TRAINING PROGRAM

## 2022-04-15 PROCEDURE — 84295 ASSAY OF SERUM SODIUM: CPT

## 2022-04-15 PROCEDURE — 6370000000 HC RX 637 (ALT 250 FOR IP)

## 2022-04-15 PROCEDURE — 2000000000 HC ICU R&B

## 2022-04-15 RX ORDER — SODIUM CHLORIDE 9 MG/ML
INJECTION, SOLUTION INTRAVENOUS CONTINUOUS PRN
Status: DISCONTINUED | OUTPATIENT
Start: 2022-04-15 | End: 2022-04-15 | Stop reason: SDUPTHER

## 2022-04-15 RX ORDER — POTASSIUM CHLORIDE 20 MEQ/1
40 TABLET, EXTENDED RELEASE ORAL PRN
Status: DISCONTINUED | OUTPATIENT
Start: 2022-04-15 | End: 2022-04-21 | Stop reason: HOSPADM

## 2022-04-15 RX ORDER — POTASSIUM CHLORIDE 7.45 MG/ML
10 INJECTION INTRAVENOUS PRN
Status: DISCONTINUED | OUTPATIENT
Start: 2022-04-15 | End: 2022-04-21 | Stop reason: HOSPADM

## 2022-04-15 RX ORDER — MIDAZOLAM HYDROCHLORIDE 1 MG/ML
INJECTION INTRAMUSCULAR; INTRAVENOUS PRN
Status: DISCONTINUED | OUTPATIENT
Start: 2022-04-15 | End: 2022-04-15 | Stop reason: SDUPTHER

## 2022-04-15 RX ORDER — ALBUMIN (HUMAN) 12.5 G/50ML
25 SOLUTION INTRAVENOUS ONCE
Status: COMPLETED | OUTPATIENT
Start: 2022-04-15 | End: 2022-04-15

## 2022-04-15 RX ORDER — SODIUM CHLORIDE 9 MG/ML
INJECTION, SOLUTION INTRAVENOUS PRN
Status: DISCONTINUED | OUTPATIENT
Start: 2022-04-15 | End: 2022-04-16 | Stop reason: SDUPTHER

## 2022-04-15 RX ORDER — PROPOFOL 10 MG/ML
INJECTION, EMULSION INTRAVENOUS PRN
Status: DISCONTINUED | OUTPATIENT
Start: 2022-04-15 | End: 2022-04-15 | Stop reason: SDUPTHER

## 2022-04-15 RX ORDER — ETOMIDATE 2 MG/ML
INJECTION INTRAVENOUS PRN
Status: DISCONTINUED | OUTPATIENT
Start: 2022-04-15 | End: 2022-04-15 | Stop reason: SDUPTHER

## 2022-04-15 RX ORDER — FUROSEMIDE 10 MG/ML
20 INJECTION INTRAMUSCULAR; INTRAVENOUS ONCE
Status: COMPLETED | OUTPATIENT
Start: 2022-04-15 | End: 2022-04-15

## 2022-04-15 RX ORDER — MAGNESIUM SULFATE HEPTAHYDRATE 40 MG/ML
2000 INJECTION, SOLUTION INTRAVENOUS ONCE
Status: COMPLETED | OUTPATIENT
Start: 2022-04-15 | End: 2022-04-15

## 2022-04-15 RX ADMIN — PROPOFOL 10 MG: 10 INJECTION, EMULSION INTRAVENOUS at 02:38

## 2022-04-15 RX ADMIN — OCTREOTIDE ACETATE 50 MCG/HR: 500 INJECTION, SOLUTION INTRAVENOUS; SUBCUTANEOUS at 23:55

## 2022-04-15 RX ADMIN — SODIUM CHLORIDE, PRESERVATIVE FREE 10 ML: 5 INJECTION INTRAVENOUS at 09:34

## 2022-04-15 RX ADMIN — PANTOPRAZOLE SODIUM 8 MG/HR: 40 INJECTION, POWDER, FOR SOLUTION INTRAVENOUS at 11:08

## 2022-04-15 RX ADMIN — IRON SUCROSE 200 MG: 20 INJECTION, SOLUTION INTRAVENOUS at 21:34

## 2022-04-15 RX ADMIN — BISACODYL 10 MG: 5 TABLET, COATED ORAL at 17:54

## 2022-04-15 RX ADMIN — ETOMIDATE 4 MG: 2 INJECTION INTRAVENOUS at 02:37

## 2022-04-15 RX ADMIN — PANTOPRAZOLE SODIUM 8 MG/HR: 40 INJECTION, POWDER, FOR SOLUTION INTRAVENOUS at 18:42

## 2022-04-15 RX ADMIN — SODIUM CHLORIDE, PRESERVATIVE FREE 10 ML: 5 INJECTION INTRAVENOUS at 21:35

## 2022-04-15 RX ADMIN — MIDAZOLAM 2 MG: 1 INJECTION INTRAMUSCULAR; INTRAVENOUS at 02:34

## 2022-04-15 RX ADMIN — SODIUM CHLORIDE: 9 INJECTION, SOLUTION INTRAVENOUS at 02:29

## 2022-04-15 RX ADMIN — MAGNESIUM SULFATE HEPTAHYDRATE 2000 MG: 40 INJECTION, SOLUTION INTRAVENOUS at 09:34

## 2022-04-15 RX ADMIN — POTASSIUM CHLORIDE 40 MEQ: 20 TABLET, EXTENDED RELEASE ORAL at 08:04

## 2022-04-15 RX ADMIN — ETOMIDATE 6 MG: 2 INJECTION INTRAVENOUS at 02:34

## 2022-04-15 RX ADMIN — RIFAXIMIN 550 MG: 550 TABLET ORAL at 21:34

## 2022-04-15 RX ADMIN — POTASSIUM CHLORIDE 40 MEQ: 20 TABLET, EXTENDED RELEASE ORAL at 17:54

## 2022-04-15 RX ADMIN — ALBUMIN (HUMAN) 25 G: 0.25 INJECTION, SOLUTION INTRAVENOUS at 11:30

## 2022-04-15 RX ADMIN — FLUOXETINE HYDROCHLORIDE 20 MG: 20 CAPSULE ORAL at 07:53

## 2022-04-15 RX ADMIN — FUROSEMIDE 20 MG: 10 INJECTION, SOLUTION INTRAMUSCULAR; INTRAVENOUS at 12:30

## 2022-04-15 RX ADMIN — OCTREOTIDE ACETATE 50 MCG/HR: 500 INJECTION, SOLUTION INTRAVENOUS; SUBCUTANEOUS at 15:19

## 2022-04-15 RX ADMIN — ATORVASTATIN CALCIUM 20 MG: 20 TABLET, FILM COATED ORAL at 21:35

## 2022-04-15 RX ADMIN — LACTULOSE 20 G: 20 SOLUTION ORAL at 07:53

## 2022-04-15 RX ADMIN — OCTREOTIDE ACETATE 50 MCG/HR: 500 INJECTION, SOLUTION INTRAVENOUS; SUBCUTANEOUS at 05:43

## 2022-04-15 RX ADMIN — RIFAXIMIN 550 MG: 550 TABLET ORAL at 07:52

## 2022-04-15 RX ADMIN — PROPOFOL 10 MG: 10 INJECTION, EMULSION INTRAVENOUS at 02:35

## 2022-04-15 RX ADMIN — DEXTROSE AND SODIUM CHLORIDE: 5; 900 INJECTION, SOLUTION INTRAVENOUS at 18:03

## 2022-04-15 ASSESSMENT — PAIN SCALES - GENERAL
PAINLEVEL_OUTOF10: 0
PAINLEVEL_OUTOF10: 0
PAINLEVEL_OUTOF10: 3
PAINLEVEL_OUTOF10: 2
PAINLEVEL_OUTOF10: 0

## 2022-04-15 ASSESSMENT — PULMONARY FUNCTION TESTS
PIF_VALUE: 1
PIF_VALUE: 0
PIF_VALUE: 1
PIF_VALUE: 1

## 2022-04-15 ASSESSMENT — PAIN DESCRIPTION - LOCATION: LOCATION: BACK

## 2022-04-15 ASSESSMENT — PAIN DESCRIPTION - ORIENTATION: ORIENTATION: LOWER

## 2022-04-15 ASSESSMENT — PAIN DESCRIPTION - PAIN TYPE: TYPE: CHRONIC PAIN

## 2022-04-15 NOTE — FLOWSHEET NOTE
04/15/22 0027   Treatment Team Notification   Reason for Communication Critical results   Team Member Name Dr. Dmitry Barrett Provider   Method of Communication Secure Message   Response See orders   Notification Time 0157   Dr. Oconnor Cover notified of critical hemoglobin of 6.8 even after multiple units of blood have been given. MD calls up to unit stating that he is in the hospital now. Orders received to order 1 unit of blood STAT and to bring patient down for EGD now.

## 2022-04-15 NOTE — PLAN OF CARE
Will plan for colonoscopy tomorrow with dr Sharee Dyer, primary rn notified orders in . Shalini Villeda, APRN - CNP

## 2022-04-15 NOTE — OP NOTE
PROCEDURE NOTE    DATE OF PROCEDURE: 4/15/2022     SURGEON: Paige Peña MD  Facility: [facility]  ASSISTANT: None  Anesthesia: MAC  PREOPERATIVE DIAGNOSIS:   Patient keep drop in his hemoglobin did have bright blood per rectum  He has a history of esophageal cancer  His liver disease with possible varices  He is status post radiation and chemotherapy for the esophageal cancer    Diagnosis:  As below          POSTOPERATIVE DIAGNOSIS: As described below    OPERATION: Upper GI endoscopy with Biopsy    ANESTHESIA: Moderate Sedation     ESTIMATED BLOOD LOSS: Less than 50 ml    COMPLICATIONS: None. SPECIMENS:  Was Not Obtained    HISTORY: The patient is a 57y.o. year old male with history of above preop diagnosis. I recommended esophagogastroduodenoscopy with possible biopsy and I explained the risk, benefits, expected outcome, and alternatives to the procedure. Risks included but are not limited to bleeding, infection, respiratory distress, hypotension, and perforation of the esophagus, stomach, or duodenum. Patient understands and is in agreement. The patient was counseled at length about the risks of mary Covid-19 during their perioperative period and any recovery window from their procedure. The patient was made aware that mary Covid-19  may worsen their prognosis for recovering from their procedure  and lend to a higher morbidity and/or mortality risk. All material risks, benefits, and reasonable alternatives including postponing the procedure were discussed. The patient does wish to proceed with the procedure at this time. PROCEDURE: The patient was given IV conscious sedation. The patient's SPO2 remained above 90% throughout the procedure. The gastroscope was inserted orally and advanced under direct vision through the esophagus, through the stomach, through the pylorus, and into the descending duodenum. Post sedation note : The patient's SPO2 remained above 90% throughout the procedure. the vital signs remained stable , and no immediate complication form the procedure noted, patient will be ready for d/c when criteria is met . Findings:    Retropharyngeal area was grossly normal appearing    The lower esophagus is inflamed with white ulceration and bluish discoloration    And there is hypervascularity indicative of radiation esophagitis  The cardia of the stomach is severely involved with those hypervascularity and the appearance of radiation effect  With very tiny oozing  No big vessel this is could be the source of the anemia but no active bleeding at this time    No annamaria esophageal varices or gastric varices seen    Around 5 cm above the GE junction there is a inflammatory polypoidal lesion measuring around 7 mm I did not want to biopsy it not to cause bleeding but this is need to be biopsied on a different occasion to rule out recurrence of malignancy      Small hiatal hernia      The rest of the stomach has some portal hypertension appearance    Duodenum was normal    The scope was removed and the patient tolerated the procedure well. Recommendations/Plan:   1. Continue Sandostatin  2. Continue PPI  3. Continue H&H monitoring and transfusion  4.  If the patient continued to have bright blood per rectum then he would need a colonoscopy    Electronically signed by Lexi Gallegos MD  on 4/15/2022 at 2:41 AM

## 2022-04-15 NOTE — PROGRESS NOTES
Dr. Alonzo Greenberg calls unit for update on patient. Pt has not had anymore bloody bowel movements since about 1430 and he only had one. Provider states to hold off on the colonoscopy tomorrow morning and to stop the golytely. Monitor patient's h/h closely q6h. Transfuse 1 PRBC if hbg < 7. Provider states to take patient for a nuclear bleeding scan STAT if patient starts having bloody bowel movements again. Dr. Alonzo Greenberg has asked to be called for an update on patient at 7 am tomorrow morning. Pt was notified of this and is to still remain NPO after midnight. Message relayed to night nurse, Walt Wiseman.

## 2022-04-15 NOTE — PROGRESS NOTES
_                         Today's Date: 4/14/2022  Patient Name: Karel Schwartz  Date of admission: 4/13/2022 10:10 AM  Patient's age: 57 y.o., 1960  Admission Dx: Severe anemia [D64.9]  Anemia, unspecified type [D64.9]      Requesting Physician: Noris White MD    CHIEF COMPLAINT: Excessive weakness and fatigue. Severe anemia. Esophageal cancer. SUBJECTIVE:  .patient was seen and examined. Feels better after blood transfusion. Had bloody BM. GI following. No CP. No dizziness. No fever. BRIEF CASE HISTORY:    The patient is a 57 y.o.  male who is admitted to the hospital for further management of severe anemia. Patient presents with extreme weakness and fatigue. Symptoms started about 3 to 4 days ago. He has no dizziness. He has shortness of breath on minimal exertion. No abdominal pain. No chest pain. No difficulty swallowing. No nausea or vomiting. Patient denies any melena or hematochezia. No hematemesis. The patient is known to our practice. 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 Patient received chemoradiation with very good results. Patient's labs showed severe anemia with hemoglobin of 3.6. He was hospitalized with received blood transfusion. He is slightly better. Patient has no other complaints with treatment plan. No fever or signs of infection. No respiratory distress.     Past Medical History:   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 ([address] Anxiety, Arthritis, Back pain, chronic, [place] esophagus, BPH (benign prostatic hypertrophy), Cholelithiasis, Cirrhosis ([address] COVID-19, COVID-19 vaccine series completed, DDD (degenerative disc disease), lumbar, Depression, Esophageal cancer ([address] Esophageal varices ([address] Fatty liver, GERD (gastroesophageal reflux disease), Hep C w/o coma, chronic ([address] History of alcohol abuse, History of blood transfusion, History of colon polyps, History of tobacco abuse, [place] (hard of hearing), Hyperlipidemia, Hypertension, Port-A-Cath in place, Sciatica, Spinal stenosis, Stomach ulcer, Tubular adenoma of colon, Vitamin D deficiency, and Wears glasses. Past Surgical History:   has a past surgical history that includes Bunionectomy; Nasal septum surgery; other surgical history (01/04/16); Colonoscopy; Colonoscopy (10/05/2016); other surgical history (11/21/2016); other surgical history (12/19/2016); knee surgery (Left); Bunionectomy (Left); Endoscopy, colon, diagnostic; pr revise median n/carpal tunnel surg (Right, 8/29/2017); Carpal tunnel release (Right); pr revise median n/carpal tunnel surg (Left, 10/31/2017); Colonoscopy (N/A, 3/30/2018); Colonoscopy (03/30/2018); pr njx aa&/strd tfrml epi lumbar/sacral 1 level (Bilateral, 9/6/2018); other surgical history (09/28/2018); pr njx aa&/strd tfrml epi lumbar/sacral 1 level (N/A, 9/28/2018); Pain management procedure (Left, 7/9/2020); Pain management procedure (Left, 7/20/2020); Pain management procedure (Bilateral, 8/17/2020); other surgical history (Right, 11/23/2020); Nerve Block (Right, 11/23/2020); Pain management procedure (Bilateral, 12/7/2020); Upper gastrointestinal endoscopy (N/A, 12/29/2020); Upper gastrointestinal endoscopy (N/A, 2/2/2021); Upper gastrointestinal endoscopy (N/A, 2/12/2021); Upper gastrointestinal endoscopy (2/12/2021); [place] tooth extraction; IR PORT PLACEMENT > 5 YEARS (4/19/2021); Upper gastrointestinal endoscopy (N/A, 8/31/2021); and Upper gastrointestinal endoscopy (N/A, 1/21/2022). Family History: family history includes Asthma in his brother; Cancer in his father and mother; Diabetes in his sister. Social History:   reports that he quit smoking about 5 years ago. He has a 45.00 pack-year smoking history. He has never used smokeless tobacco. He reports previous alcohol use. He reports previous drug use. Frequency: 1.00 time per week.    Medications:    Prior to Admission medications    Medication Sig Start Date End Date Taking? Authorizing Provider   cyclobenzaprine (FLEXERIL) 10 MG tablet Take 10 mg by mouth daily as needed for Muscle spasms   Yes Historical Provider, MD   oxyCODONE-acetaminophen (PERCOCET) 5-325 MG per tablet Take 1 tablet by mouth daily as needed for Pain for up to 30 days.  4/6/22 5/6/22  MASSIMO Busby CNP   furosemide (LASIX) 20 MG tablet take 1 tablet by mouth once daily 4/4/22   Candy Kanner, APRN - NP   nadolol (CORGARD) 20 MG tablet Take 1 tablet by mouth daily 2/8/22   Shelley Griffith MD   ferrous sulfate (IRON 325) 325 (65 Fe) MG tablet Take 1 tablet by mouth 2 times daily 1/12/22   VASU Duran   omeprazole (PRILOSEC) 40 MG delayed release capsule take 1 capsule by mouth EVERY MORNING BEFORE BREAKFAST 11/12/21   Brent Boyer MD   FLUoxetine (PROZAC) 20 MG capsule take 1 capsule by mouth once daily 11/12/21   VASU Duran   lidocaine-prilocaine (EMLA) 2.5-2.5 % cream Apply topically 45-60 mins prior to needle poke daily PRN 4/22/21   Brent Boyer MD   atorvastatin (LIPITOR) 20 MG tablet take 1 tablet by mouth at bedtime 2/23/21   MASSIMO Connell CNP   spironolactone (ALDACTONE) 50 MG tablet take 1 tablet by mouth once daily 2/23/21   MASSIMO Connell CNP     Current Facility-Administered Medications   Medication Dose Route Frequency Provider Last Rate Last Admin    0.9 % sodium chloride infusion   IntraVENous PRN MASSIMO Vance CNP        octreotide (SANDOSTATIN) 500 mcg in sodium chloride 0.9 % 100 mL infusion  50 mcg/hr IntraVENous Continuous MASSIMO Colin CNP 10 mL/hr at 04/14/22 2150 50 mcg/hr at 04/14/22 2150    0.9 % sodium chloride infusion   IntraVENous PRN MASSIMO Colin CNP        dextrose 5 % and 0.9 % sodium chloride infusion   IntraVENous Continuous Rick Schwartz MD 50 mL/hr at 04/14/22 1954 New Bag at 04/14/22 1954    0.9 % sodium chloride infusion   IntraVENous PRN MASSIMO Beasley - CNP        0.9 % sodium chloride infusion   IntraVENous PRN Lucian Harley MD        magnesium sulfate 1000 mg in dextrose 5% 100 mL IVPB  1,000 mg IntraVENous PRN Karen Massey MD        0.9 % sodium chloride infusion   IntraVENous PRN Nicky Disla MD        sodium chloride flush 0.9 % injection 5-40 mL  5-40 mL IntraVENous 2 times per day Marybel Shell MD   10 mL at 04/14/22 1946    sodium chloride flush 0.9 % injection 5-40 mL  5-40 mL IntraVENous PRN Marybel Shell MD        0.9 % sodium chloride infusion  25 mL IntraVENous PRN Marybel Shell MD        ondansetron (ZOFRAN-ODT) disintegrating tablet 4 mg  4 mg Oral Q8H PRN Marybel Shell MD        Or    [facility]etron [facility] injection 4 mg  4 mg IntraVENous Q6H PRN Marybel Shell MD   4 mg at 04/14/22 0117    polyethylene glycol (GLYCOLAX) packet 17 g  17 g Oral Daily PRN Marybel Shell MD        hydrOXYzine (ATARAX) tablet 10 mg  10 mg Oral Once Marybel Shell MD        midodrine (PROAMATINE) tablet 5 mg  5 mg Oral TID PRN Karen Massey MD        atorvastatin (LIPITOR) tablet 20 mg  20 mg Oral Nightly Karen Massey MD        FLUoxetine (PROZAC) capsule 20 mg  20 mg Oral Daily Karen Massey MD   20 mg at 04/14/22 0828    sodium chloride flush 0.9 % injection 5-40 mL  5-40 mL IntraVENous BID Karen Massey MD   10 mL at 04/14/22 1945    rifaximin (XIFAXAN) tablet 550 mg  550 mg Oral BID MASSIMO Beasley - CNP   550 mg at 04/14/22 0827    lactulose (CHRONULAC) 10 GM/15ML solution 20 g  20 g Oral TID MASSIMO Beasley - CNP   20 g at 04/14/22 0828    cefTRIAXone (ROCEPHIN) 2000 mg IVPB in D5W 50ml minibag  2,000 mg IntraVENous Q24H Karen Massey MD   Stopped at 04/14/22 1735    pantoprazole (PROTONIX) 80 mg in sodium chloride 0.9 % 100 mL infusion  8 mg/hr IntraVENous Continuous Karen Massey MD 10 mL/hr at 04/14/22 2241 8 mg/hr at 04/14/22 2241    0.9 % sodium chloride infusion   IntraVENous PRN Rene Walker Michi Dunlap MD        iron sucrose (VENOFER) 200 mg in sodium chloride 0.9 % 100 mL IVPB  200 mg IntraVENous Q24H Teri Houser MD   Stopped at 22    oxyCODONE-acetaminophen (PERCOCET) 5-325 MG per tablet 1 tablet  1 tablet Oral Daily PRN Darryl Graves, APRN - CNP           Allergies:  Bee pollen and Pollen extract    REVIEW OF SYSTEMS:      · General: Positive for weakness and fatigue. No unanticipated weight loss or decreased appetite. No fever or chills. · Eyes: No blurred vision, eye pain or double vision. · Ears: No hearing problems or drainage. No tinnitus. · Throat: No sore throat, problems with swallowing or dysphagia. · Respiratory: No cough, sputum or hemoptysis. Positive for exertional shortness of breath. No pleuritic chest pain. · Cardiovascular: No chest pain, orthopnea or PND. No lower extremity edema. No palpitation. · Gastrointestinal: As above. · Genitourinary: No dysuria, hematuria, frequency or urgency. · Musculoskeletal: No muscle aches or pains. No limitation of movement. No back pain. No gait disturbance, No joint complaints. · Dermatologic: No skin rashes or pruritus. No skin lesions or discolorations. · Psychiatric: No depression, anxiety, or stress or signs of schizophrenia. No change in mood or affect. · Hematologic: No history of bleeding tendency. No bruises or ecchymosis. No history of clotting problems. · Infectious disease: No fever, chills or frequent infections. · Endocrine: No polydipsia or polyuria. No temperature intolerance. · Neurologic: No headaches or dizziness. No weakness or numbness of the extremities. No changes in balance, coordination,  memory, mentation, behavior. · Allergic/Immunologic: No nasal congestion or hives. No repeated infections.        PHYSICAL EXAM:      BP (!) 104/55   Pulse 95   Temp 98.4 °F (36.9 °C) (Oral)   Resp 17   SpO2 98%    Temp (24hrs), Av.4 °F (36.9 °C), Min:97.5 °F (36.4 °C), Max:98.8 °F (37.1 °C)      General appearance - not in pain or distress. Looks pale. Mental status - alert and oriented  Eyes - pupils equal and reactive, extraocular eye movements intact  Ears - bilateral TM's and external ear canals normal  Nose - normal and patent, no erythema, discharge or polyps  Mouth - mucous membranes moist, pharynx normal without lesions  Neck - supple, no significant adenopathy  Lymphatics - no palpable lymphadenopathy, no hepatosplenomegaly  Chest - clear to auscultation, no wheezes, rales or rhonchi, symmetric air entry  Heart - normal rate, regular rhythm, normal S1, S2, no murmurs, rubs, clicks or gallops  Abdomen - soft, nontender, nondistended, no masses or organomegaly  Neurological - alert, oriented, normal speech, no focal findings or movement disorder noted  Musculoskeletal - no joint tenderness, deformity or swelling  Extremities - peripheral pulses normal, no pedal edema, no clubbing or cyanosis  Skin - normal coloration and turgor, no rashes, no suspicious skin lesions noted           DATA:      Labs:       CBC:   Recent Labs     04/13/22  1105 04/13/22  1853 04/14/22  0434 04/14/22  0434 04/14/22  0947 04/14/22  1502   WBC 14.2*  --  16.4*  --   --   --    HGB 3.5*   < > 5.7*   < > 6.2* 6.9*   HCT 12.6*   < > 18.1*   < > 19.6* 21.5*     --  173  --   --   --     < > = values in this interval not displayed. BMP:   Recent Labs     04/13/22  1020 04/13/22  1020 04/14/22  0434 04/14/22  2106   *   < > 122* 126*   K 5.2  --  5.2  --    CO2 20  --  25  --    BUN 36*  --  41*  --    CREATININE 1.42*  --  1.32*  --    LABGLOM 51*  --  55*  --    GLUCOSE 111*  --  99  --     < > = values in this interval not displayed.      PT/INR:   Recent Labs     04/13/22  1020   PROTIME 18.3*   INR 1.5     APTT:  Recent Labs     04/13/22  1020   APTT 34.8     LIVER PROFILE:  Recent Labs     04/13/22  1020   AST 33   ALT 20   LABALBU 2.9*     IR US GUIDED PARACENTESIS  Narrative: PROCEDURE:  PARACENTESIS WITH IMAGE GUIDANCE    US ABDOMEN LIMITED    4/14/2022    HISTORY:  ORDERING SYSTEM PROVIDED HISTORY: ascites remove 5 liters only  TECHNOLOGIST PROVIDED HISTORY:  ascites remove 5 liters only    TECHNIQUE:  Informed consent was obtained after a detailed explanation of the procedure  including risks, benefits, and alternatives. Universal protocol was  followed. A limited ultrasound of the abdomen was performed and shows a  moderate to large amount of ascites. The right abdomen was prepped and  draped in sterile fashion and local anesthesia was achieved with lidocaine. A 5 Tamazight needle sheath was advanced into ascites using realtime ultrasound  guidance and paracentesis was performed. The patient tolerated the procedure  well. EBL: None    FINDINGS:  Limited ultrasound of the abdomen demonstrates ascites. A total of 5 L of  slightly turbid yellow colored fluid was removed. Additional fluid remains  on completion. Impression: Successful ultrasound-guided paracentesis. IMPRESSION:    Primary Problem  Gastrointestinal hemorrhage with melena    Active Hospital Problems    Diagnosis Date Noted    Anemia [D64.9] 04/13/2022    Acute kidney injury ([address] [N17.9] 04/13/2022    Esophageal [facility] [C15.9]     Former smoker, 50+ pack years, quit 2016 [Z87.891] 12/20/2021    Spinal stenosis of lumbar region with neurogenic claudication [M48.062] 12/14/2021    Malignant neoplasm of lower third of esophagus ([address] [C15.5]     Gastrointestinal hemorrhage with melena [K92.1]     Hepatitis C virus infection resolved after antiviral drug therapy [Z86.19] 12/23/2020    Hepatic cirrhosis ([address] [K74.60] 09/15/2016    GERD (gastroesophageal reflux disease) [K21.9] 04/19/2012       RECOMMENDATIONS:  1. Records and labs and images were reviewed and discussed with the patient. 2. Patient had esophageal cancer of the lower third of the esophagus.   Status post chemoradiation with good results. 3. Current presentation with severe anemia with hemoglobin of 3.6. Concerning for acute blood loss anemia. Patient does not have any clinical evidence of active bleeding still the findings of the labs are quite concerning. 4. Appreciate GI input. 5.  We will continue to transfuse to maintain hemoglobin above 7   6. I will continue  IV iron infusion. 7. Further recommendations based results of endoscopy. 8. Patient's questions were answered to the best of his satisfaction and he verbalized full understanding and agreement. Discussed with patient and Nurse. Janice Wallis MD, MD                            [address] [place] [address] [facility]                            This note is created with the assistance of a speech recognition program.  While intending to generate a document that actually reflects the content of the visit, the document can still have some errors including those of syntax and sound a like substitutions which may escape proof reading. It such instances, actual meaning can be extrapolated by contextual diversion.

## 2022-04-15 NOTE — CARE COORDINATION
ONGOING DISCHARGE PLAN:    Patient is alert and oriented x4. Spoke with patient regarding discharge plan and patient confirms that plan is still to return to home w/ YULIANA Bennett, [facility]. LSW is following. Referral was sent yesterday. Pt. Had EGD today, per notes, [place]. No BX taken. GI following. Remains On Sandostatin/Protonix GTT'S. HGB today 8.3, IV FE. Full liquids. Pulm/GI following. Paracentesis 4/14 w/ 5L taken off. HX of Esophageal Cancer. Will continue to follow for additional discharge needs.     Electronically signed by Magdalena Gan RN on 4/15/2022 at 12:43 PM

## 2022-04-15 NOTE — PROGRESS NOTES
[address]    PROGRESS NOTE             4/15/2022    1:08 PM    Name:   Nydia Simon  MRN:     [id number]     [place]:      [de-identified]   Room:   2007/2007-01  IP Day:  2  Admit Date:  4/13/2022 10:10 AM    PCP:  Marjie Holter, PA  Code Status:  Full Code    Subjective:     C/C:   Chief Complaint   Patient presents with    Shortness of Breath     Interval History Status: improved. Patient had emergent EGD overnight for hemoglobin which remained below 7 despite several units of PRBCs. No active bleed was seen. May need colonoscopy if continuing to have bright red blood per rectum. Low grade fever overnight 100.2. Patient states he feels well and that the paracentesis provided him some relief, though he still becomes short of breath with exertion and has resultant back pain from the extra weight he carries in his abdomen. Brief History:     The patient is a 58 y.o.  Non- / non  male with PMH of alcoholic liver disease/cirrhosis, history of hepatitis C (treated).  He is a former smoker with 50+ pack year history, and admits to former heavy alcohol use.  Denies illicit or IV drug use.  Patient also has a history of GE junction adenocarcinoma s/p chemoradiation completed 6/9/21 and PET 7/23/21 with no recurrence, as well as most recent EGD on 1/21/22 which was negative for malignanacy.  This EGD also showed severe portal hypertensive gastropathy with some oozing of blood visualized.     He presented to the emergency department today from pain management clinic where he was scheduled to have an epidural steroid injection for back pain due to spinal stenosis.  However, he was unable to lie prone due to abdominal distention and shortness of breath and he was sent to the emergency department.      According to the patient, he was scheduled for therapeutic paracentesis today at [facility] this afternoon.  His most recent paracentesis was 4/5/2022 with 7.5 L removed.  Per patient, he states that it accumulated quickly afterwards and came back Nunez Isai. \"  This was his second time having paracentesis done; previous paracentesis done in 2016.  He states that he has not been taking his spironolactone for the past few days, as he felt that it was making his blood pressure too low.  He reports that he took all other home medications, though.     Currently, patient states he has some shortness of breath which she attributes to abdominal distention, as well as nausea. However, denies  fever, chills, chest pain, abdominal pain, hematuria, or dark/bloody stool.  Last bowel movement was yesterday     In the emergency department, hemoglobin was found to be 3.6, recheck 3.5. Sodium 120.  Creatinine 1.42, baseline within normal limits.  Total bili 2.4.  INR 1.5.       He was given a dose of Protonix and 1 unit PRBCs in the ED.     He is being admitted to the hospital for the management of severe anemia and sepsis.     4/13: pt received 3 units PRBCs, venofer added per heme/onc     4/14: IR guided paracentesis 5L removed, FOBT positive, bowel movement revealed bright red blood per rectum; hgb 6.9 despite receiving 6 units of blood and FFP, emergent EGD overnight following additional episodes of hematochezia, no annamaria bleeding noted, may need colonoscopy    Review of Systems:     Review of Systems   Unable to perform ROS: Other (pt using restroom )       Medications: Allergies:     Allergies   Allergen Reactions    Bee Pollen Other (See Comments)     Runny nose, watery eyes    Pollen Extract      Runny nose, watery eyes       Current Meds:   Scheduled Meds:    furosemide  20 mg IntraVENous Once    sodium chloride flush  5-40 mL IntraVENous 2 times per day    atorvastatin  20 mg Oral Nightly    [Held by provider] FLUoxetine  20 mg Oral Daily    sodium chloride flush  5-40 mL IntraVENous BID    rifaximin  550 mg Oral BID    lactulose  20 g Oral TID    iron sucrose  200 mg IntraVENous Q24H     Continuous Infusions:    sodium chloride      sodium chloride      octreotide (SandoSTATIN) infusion 50 mcg/hr (04/15/22 0578)    sodium chloride      dextrose 5 % and 0.9 % NaCl 50 mL/hr at 04/14/22 1954    sodium chloride      sodium chloride      sodium chloride      sodium chloride      pantoprazole 8 mg/hr (04/15/22 1108)    sodium chloride       PRN Meds: sodium chloride, potassium chloride **OR** potassium alternative oral replacement **OR** potassium chloride, sodium chloride, sodium chloride, sodium chloride, sodium chloride, magnesium sulfate, sodium chloride, sodium chloride flush, sodium chloride, ondansetron **OR** ondansetron, polyethylene glycol, midodrine, sodium chloride, oxyCODONE-acetaminophen    Data:     Past Medical History:   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 ([facility] Anxiety, Arthritis, Back pain, chronic, Lezama esophagus, BPH (benign prostatic hypertrophy), Cholelithiasis, Cirrhosis ([facility] COVID-19, COVID-19 vaccine series completed, DDD (degenerative disc disease), lumbar, Depression, Esophageal cancer ([facility] Esophageal varices ([facility] Fatty liver, GERD (gastroesophageal reflux disease), Hep C w/o coma, chronic ([facility] History of alcohol abuse, History of blood transfusion, History of colon polyps, History of tobacco abuse, [place] (hard of hearing), Hyperlipidemia, Hypertension, Port-A-Cath in place, Sciatica, Spinal stenosis, Stomach ulcer, Tubular adenoma of colon, Vitamin D deficiency, and Wears glasses. Social History:   reports that he quit smoking about 5 years ago. He has a 45.00 pack-year smoking history. He has never used smokeless tobacco. He reports previous alcohol use. He reports previous drug use. Frequency: 1.00 time per week.      Family History:   Family History   Problem Relation Age of Onset   [place] Favorite Cancer Mother         pancreatic    Cancer Father         bone    Diabetes Sister    [place] Favorite Asthma Brother        Vitals:  BP (!) 123/54   Pulse 80   Temp 97.8 °F (36.6 °C) (Oral)   Resp 13   Wt 216 lb 4.3 oz (98.1 kg)   SpO2 92%   BMI 31.03 kg/m²   Temp (24hrs), Av.7 °F (37.1 °C), Min:97.8 °F (36.6 °C), Max:100.2 °F (37.9 °C)    No results for input(s): POCGLU in the last 72 hours. I/O(24Hr):     Intake/Output Summary (Last 24 hours) at 4/15/2022 1308  Last data filed at 4/15/2022 0627  Gross per 24 hour   Intake 2538.19 ml   Output 5000 ml   Net -2461.81 ml       Labs:    CBC with Differential:    Lab Results   Component Value Date    WBC 7.8 04/15/2022    RBC 2.89 04/15/2022    RBC 4.75 2012    HGB 8.3 04/15/2022    HCT 25.4 04/15/2022     04/15/2022     2012    MCV 79.9 04/15/2022    MCH 26.2 04/15/2022    MCHC 32.8 04/15/2022    RDW 18.6 04/15/2022    NRBC 1 2022    LYMPHOPCT 2 04/15/2022    MONOPCT 6 04/15/2022    MYELOPCT 1 2021    BASOPCT 0 04/15/2022    MONOSABS 0.47 04/15/2022    LYMPHSABS 0.16 04/15/2022    EOSABS 0.00 04/15/2022    BASOSABS 0.00 04/15/2022    DIFFTYPE NOT REPORTED 2022     Hemoglobin/Hematocrit:    Lab Results   Component Value Date    HGB 8.3 04/15/2022    HCT 25.4 04/15/2022     BMP:    Lab Results   Component Value Date     04/15/2022    K 3.2 04/15/2022    CL 93 04/15/2022    CO2 23 04/15/2022    BUN 27 04/15/2022    LABALBU 2.9 04/15/2022    LABALBU 4.7 2012    CREATININE 0.82 04/15/2022    CALCIUM 8.3 04/15/2022    GFRAA >60 04/15/2022    LABGLOM >60 04/15/2022    GLUCOSE 158 04/15/2022    GLUCOSE 65 2012     Sodium:    Lab Results   Component Value Date     04/15/2022       Lab Results   Component Value Date/Time    SPECIAL NOT REPORTED 2022 11:28 AM     Lab Results   Component Value Date/Time    CULTURE NO GROWTH 10 HOURS 2022 03:30 PM         Radiology:    IR US GUIDED PARACENTESIS    Result Date: 2022  PROCEDURE: PARACENTESIS WITH IMAGE GUIDANCE US ABDOMEN LIMITED 4/14/2022 HISTORY: ORDERING SYSTEM PROVIDED HISTORY: ascites remove 5 liters only TECHNOLOGIST PROVIDED HISTORY: ascites remove 5 liters only TECHNIQUE: Informed consent was obtained after a detailed explanation of the procedure including risks, benefits, and alternatives. Universal protocol was followed. A limited ultrasound of the abdomen was performed and shows a moderate to large amount of ascites. The right abdomen was prepped and draped in sterile fashion and local anesthesia was achieved with lidocaine. A 5 Macanese needle sheath was advanced into ascites using realtime ultrasound guidance and paracentesis was performed. The patient tolerated the procedure well. EBL: None FINDINGS: Limited ultrasound of the abdomen demonstrates ascites. A total of 5 L of slightly turbid yellow colored fluid was removed. Additional fluid remains on completion. Successful ultrasound-guided paracentesis. IR US GUIDED PARACENTESIS    Result Date: 4/5/2022  PROCEDURE: IR US GUIDED PARACENTESIS W IMAGING 4/5/2022 HISTORY: ORDERING SYSTEM PROVIDED HISTORY: Ascites due to alcoholic cirrhosis (HCC) TECHNIQUE: Sonography was utilized CONTRAST: None SEDATION: None FLUOROSCOPY DOSE AND TYPE OR TIME AND EXPOSURES: None DESCRIPTION OF PROCEDURE: Informed consent was obtained after a detailed explanation of the procedure including risks, benefits, and alternatives. Universal protocol was observed. Preprocedure ultrasound shows a dominant fluid pocket in the right lowerquadrant. Using ultrasound guidance (image attached to the medical record), the fluid pocket was accessed with a 5 [place] Lorrie Yueh needle with aspiration of inch clear yellow fluid. Vacuum bottle paracentesis was performed and approximately 7.25 L was removed. the patient tolerated the procedure well and left the department in good condition. Dr. Lianne Guevara was present. Patient received IV albumin replacement.  FINDINGS: 7.25 L drained     Successful ultrasound-guided therapeutic paracentesis         Physical Examination:        Physical Exam  Eyes:      General: No scleral icterus. Extraocular Movements: Extraocular movements intact. Conjunctiva/sclera: Conjunctivae normal.   Cardiovascular:      Rate and Rhythm: Normal rate. Pulmonary:      Effort: Pulmonary effort is normal. No respiratory distress. Skin:     Coloration: Skin is jaundiced. Neurological:      General: No focal deficit present. Mental Status: He is alert and oriented to person, place, and time.    Psychiatric:         Mood and Affect: Mood normal.         Behavior: Behavior normal.           Assessment:        Primary Problem  Gastrointestinal hemorrhage with melena    Active Hospital Problems    Diagnosis Date Noted    Anemia [D64.9] 04/13/2022    Acute kidney injury ([facility] [N17.9] 04/13/2022    Esophageal adenocarcinoma ([facility] [C15.9]     Former smoker, 50+ pack years, quit 2016 [Z87.891] 12/20/2021    Spinal stenosis of lumbar region with neurogenic claudication [M48.062] 12/14/2021    Malignant neoplasm of lower third of esophagus ([facility] [C15.5]     Gastrointestinal hemorrhage with melena [K92.1]     Hepatitis C virus infection resolved after antiviral drug therapy [Z86.19] 12/23/2020    Hepatic cirrhosis ([facility] [K74.60] 09/15/2016    GERD (gastroesophageal reflux disease) [K21.9] 04/19/2012       Plan:        Severe anemia in the setting of cirrhosis and portal hypertension, likely secondary to GI bleed   -Hemoglobin 3.5 on admission, 7.6 this morning, recheck 8.3  -Has received 7 units PRBCs and 1 unit FFP  -H&H every 6 hours  -Continue to monitor, transfuse if hgb <7  -Protonix drip   -FOBT positive and pt is having melena and hematochezia  -Emergent EGD 2am on 4/15, revealed:   -Lower esophagus: Inflammation with white ulceration and bluish discoloration, hypervascularity indicative of radiation esophagitis   -Cardia of stomach: Radiation changes with hypervascularity and \"very tiny oozing\"   -Rest of stomach has some portal hypertensive changes   -5 cm proximal to GE junction, there is an inflammatory polypoid lesion measuring around 7 mm; was not biopsied due to risk of bleeding but will require outpatient follow-up to rule out cancer recurrence  -GI is following, may need colonoscopy  -Clear liquid diet for now    Hypokalemia  -Potassium 3.2 this AM  -40 mEq oral administered this morning  -Recheck this afternoon  -Continue to monitor and replace per protocol     Hyponatremia, improving  -Sodium 120 on admission, 129 this AM, recheck 131  -Pt is not chronically hyponatremic  -Patient is likely intravascularly volume depleted 2/2 third spacing from ascites  -IV fluid hydration with D5 NS at 50cc/h  -Lasix 20mg IV administered yesterday, will give additional dose today    Sepsis, ruled out   -Tachycardia and WBCs 14.2 on admission  -Lactic acid 2.0  -SBP in 100s, midodrine 5mg TIID PRN for SBP <110,   -Urinalysis positive for nitrites and leukocyte esterase, no hgb, WBC 0-2, no growth on urine culture - UTI ruled out  -Leukocytosis resolved  -blood cultures no growth  -IV rocephin for UTI and SBP ppx, as well as rifaximin; SBP ruled out based on results of fluid analysis (see below), d/c rocephin  -IR guided paracentesis 4/14; culture no growth 10 hours, cell count not indicative of SBP, only 63 WBCs/mm3, 19% neutrophils    Acute kidney injury, resolved  -pt does not have history of CKD or hepatorenal syndrome  -Creatinine 1.42 on admission, 0.82 this AM  -FeUrea 19.5% indicative of prerenal injury  -continue IV fluids D5 NS at 50cc/h     Ascites 2/2 cirrhotic liver disease  -cirrhosis 2/2 heavy alcohol use in the past; patient also has history of hepatitis C s/p treatment  -Patient had IR guided paracentesis on 4/5/2022 and had rapid reaccumulation of ascitic fluid during this time; was scheduled for paracentesis on day of admission  -s/p IR guided paracentesis 4/14; 5L max removed per GI recommendations  -MELD score 27, Child-Laguna score 8      Portal hypertension, held home lasix, nadolol, and spironolactone d/t hypotension     History of GE junction adenocarcinoma, in remission  -pt completed chemoradiation 6/9/2021; PET scan 7/23/21 showed no recurrence  -Most recent EGD done 1/21/22: flat 3-4mm polyp at GE junction which showed no malignancy     GI PPx: Protonix drip  DVT prophylaxis: SCD cuffs, no chemical anticoagulation at this time due to severe anemia and possible active bleed  Diet: clear liquid    Michelle Curtis MD  4/15/2022  1:08 PM

## 2022-04-15 NOTE — ANESTHESIA POSTPROCEDURE EVALUATION
Department of Anesthesiology  Postprocedure Note    Patient: Antonella Grant  MRN: [id number]  YOB: 1960  Date of evaluation: 4/15/2022  Time:  2:58 AM     Procedure Summary     Date: 04/15/22 Room / Location: [address] [place] [address] [address] / [facility]: WESLEY HAILE    Anesthesia Start: 0229 Anesthesia Stop: 9510    Procedure: EGD ESOPHAGOGASTRODUODENOSCOPY (N/A Esophagus) Diagnosis: (GI bleed)    Surgeons: Zohra Diego MD Responsible Provider: Len Chen MD    Anesthesia Type: general ASA Status: 4 - Emergent          Anesthesia Type: general    Yen Phase I: Yen Score: 8    Yen Phase II:      Last vitals: Reviewed and per EMR flowsheets.        Anesthesia Post Evaluation    Comments: POST- ANESTHESIA EVALUATION       Pt Name: Antonella Grant  MRN: [id number]  YOB: 1960  Date of evaluation: 4/15/2022  Time:  2:58 AM      BP (!) 92/56   Pulse 78   Temp 100.2 °F (37.9 °C) (Infrared)   Resp 15   SpO2 97%      Consciousness Level  Awake  Cardiopulmonary Status  Stable  Pain Adequately Treated YES  Nausea / Vomiting  NO  Adequate Hydration  YES  Anesthesia Related Complications NONE      Electronically signed by Len Chen MD on 4/15/2022 at 2:58 AM

## 2022-04-15 NOTE — PLAN OF CARE
Problem: Falls - Risk of:  Goal: Will remain free from falls  Description: Will remain free from falls  4/15/2022 1814 by Vance Chang RN  Outcome: Ongoing  4/15/2022 0425 by Wagner Storey RN  Outcome: Ongoing  Note: Bed remains in lowest position, call light within reach. Patient remains free of falls at this time. RN will continue to monitor. Goal: Absence of physical injury  Description: Absence of physical injury  4/15/2022 1814 by Vance Chang RN  Outcome: Ongoing  4/15/2022 0425 by Wagner Storey RN  Outcome: Ongoing  Note: Fall assessment performed and appropriate measures implemented. Room freed from clutter. Bed in lowest position with wheels locked. Call light in place. ID band in place. Problem: Activity:  Goal: Fatigue will decrease  Description: Fatigue will decrease  4/15/2022 1814 by Vance Chang RN  Outcome: Ongoing  4/15/2022 0425 by Wagner Storey RN  Outcome: Ongoing  Goal: Ability to tolerate increased activity will improve  Description: Ability to tolerate increased activity will improve  4/15/2022 1814 by Vance Chang RN  Outcome: Ongoing  4/15/2022 0425 by Wagner Storey RN  Outcome: Ongoing  Goal: Ability to maintain optimal joint mobility will improve  Description: Ability to maintain optimal joint mobility will improve  4/15/2022 1814 by Vance Chang RN  Outcome: Ongoing  4/15/2022 0425 by Wagner tSorey RN  Outcome: Ongoing     Problem:  Bowel/Gastric:  Goal: Ability to achieve a regular elimination pattern will improve  Description: Ability to achieve a regular elimination pattern will improve  4/15/2022 1814 by Vance Chang RN  Outcome: Ongoing  4/15/2022 0425 by Wagner Storey RN  Outcome: Ongoing     Problem: Cardiac:  Goal: Ability to maintain an adequate cardiac output will improve  Description: Ability to maintain an adequate cardiac output will improve  4/15/2022 1814 by Vance Chang RN  Outcome: Ongoing  4/15/2022 0425 by Johnathan Bryant RN  Outcome: Ongoing  Goal: Ability to maintain adequate ventilation will improve  Description: Ability to maintain adequate ventilation will improve  4/15/2022 1814 by Shannon Wolfe RN  Outcome: Ongoing  4/15/2022 0425 by Johnathan Bryant RN  Outcome: Ongoing  Goal: Ability to achieve and maintain adequate cardiopulmonary perfusion will improve  Description: Ability to achieve and maintain adequate cardiopulmonary perfusion will improve  4/15/2022 1814 by Shannon Wolfe RN  Outcome: Ongoing  4/15/2022 0425 by Johnathan Bryant RN  Outcome: Ongoing     Problem: Coping:  Goal: Ability to adjust to condition or change in health will improve  Description: Ability to adjust to condition or change in health will improve  4/15/2022 1814 by Shannon Wolfe RN  Outcome: Ongoing  4/15/2022 0425 by Johnathan Bryant RN  Outcome: Ongoing  Goal: Communication of feelings regarding changes in body function or appearance will improve  Description: Communication of feelings regarding changes in body function or appearance will improve  4/15/2022 1814 by Shannon Wolfe RN  Outcome: Ongoing  4/15/2022 0425 by Johnathan Bryant RN  Outcome: Ongoing     Problem: Safety:  Goal: Ability to remain free from injury will improve  Description: Ability to remain free from injury will improve  4/15/2022 1814 by Shannon Wolfe RN  Outcome: Ongoing  4/15/2022 0425 by Johnathan Bryant RN  Outcome: Ongoing     Problem: Sensory:  Goal: Pain level will decrease  Description: Pain level will decrease  4/15/2022 1814 by Shannon Wolfe RN  Outcome: Ongoing  4/15/2022 0425 by Johnathan Bryant RN  Outcome: Ongoing  Note: Patient denies having any pain.    Goal: General experience of comfort will improve  Description: General experience of comfort will improve  4/15/2022 1814 by Shannon Wolfe RN  Outcome: Ongoing  4/15/2022 0425 by Johnathan Bryant RN  Outcome: Ongoing     Problem: Skin Integrity:  Goal: Skin integrity will improve  Description: Skin integrity will improve  4/15/2022 1814 by Aditi Velasco RN  Outcome: Ongoing  4/15/2022 0425 by Melony Gonzalez RN  Outcome: Ongoing  Goal: Signs of wound healing will improve  Description: Signs of wound healing will improve  4/15/2022 1814 by Aditi Velasco RN  Outcome: Ongoing  4/15/2022 0425 by Melony Gonzalez RN  Outcome: Ongoing  Goal: Will show no infection signs and symptoms  Description: Will show no infection signs and symptoms  4/15/2022 1814 by Aditi Velasco RN  Outcome: Ongoing  4/15/2022 0425 by Melony Gonzalez RN  Outcome: Ongoing  Note: Patient afebrile. No signs or symptoms of infection noted.    Goal: Absence of new skin breakdown  Description: Absence of new skin breakdown  4/15/2022 1814 by Aditi Velasco RN  Outcome: Ongoing  4/15/2022 0425 by Melony Gonzalez RN  Outcome: Ongoing

## 2022-04-15 NOTE — PLAN OF CARE
Problem: Falls - Risk of:  Goal: Will remain free from falls  Description: Will remain free from falls  Outcome: Ongoing  Note: Bed remains in lowest position, call light within reach. Patient remains free of falls at this time. RN will continue to monitor. Goal: Absence of physical injury  Description: Absence of physical injury  Outcome: Ongoing  Note: Fall assessment performed and appropriate measures implemented. Room freed from clutter. Bed in lowest position with wheels locked. Call light in place. ID band in place. Problem: Activity:  Goal: Fatigue will decrease  Description: Fatigue will decrease  Outcome: Ongoing  Goal: Ability to tolerate increased activity will improve  Description: Ability to tolerate increased activity will improve  Outcome: Ongoing  Goal: Ability to maintain optimal joint mobility will improve  Description: Ability to maintain optimal joint mobility will improve  Outcome: Ongoing     Problem:  Bowel/Gastric:  Goal: Ability to achieve a regular elimination pattern will improve  Description: Ability to achieve a regular elimination pattern will improve  Outcome: Ongoing     Problem: Cardiac:  Goal: Ability to maintain an adequate cardiac output will improve  Description: Ability to maintain an adequate cardiac output will improve  Outcome: Ongoing  Goal: Ability to maintain adequate ventilation will improve  Description: Ability to maintain adequate ventilation will improve  Outcome: Ongoing  Goal: Ability to achieve and maintain adequate cardiopulmonary perfusion will improve  Description: Ability to achieve and maintain adequate cardiopulmonary perfusion will improve  Outcome: Ongoing     Problem: Coping:  Goal: Ability to adjust to condition or change in health will improve  Description: Ability to adjust to condition or change in health will improve  Outcome: Ongoing  Goal: Communication of feelings regarding changes in body function or appearance will improve  Description: Communication of feelings regarding changes in body function or appearance will improve  Outcome: Ongoing     Problem: Health Behavior:  Goal: Identification of resources available to assist in meeting health care needs will improve  Description: Identification of resources available to assist in meeting health care needs will improve  Outcome: Ongoing     Problem: Nutritional:  Goal: Maintenance of adequate nutrition will improve  Description: Maintenance of adequate nutrition will improve  Outcome: Ongoing     Problem: Physical Regulation:  Goal: Signs and symptoms of infection will decrease  Description: Signs and symptoms of infection will decrease  Outcome: Ongoing  Goal: Will show no signs and symptoms of excessive bleeding  Description: Will show no signs and symptoms of excessive bleeding  Outcome: Ongoing  Note: Patient had one episode of bloody stool during shift. Patient taken for EGD tonight. Goal: Complications related to the disease process, condition or treatment will be avoided or minimized  Description: Complications related to the disease process, condition or treatment will be avoided or minimized  Outcome: Ongoing     Problem: Safety:  Goal: Ability to remain free from injury will improve  Description: Ability to remain free from injury will improve  Outcome: Ongoing     Problem: Sensory:  Goal: Pain level will decrease  Description: Pain level will decrease  Outcome: Ongoing  Note: Patient denies having any pain.    Goal: General experience of comfort will improve  Description: General experience of comfort will improve  Outcome: Ongoing     Problem: Skin Integrity:  Goal: Skin integrity will improve  Description: Skin integrity will improve  Outcome: Ongoing  Goal: Signs of wound healing will improve  Description: Signs of wound healing will improve  Outcome: Ongoing  Goal: Will show no infection signs and symptoms  Description: Will show no infection signs and symptoms  Outcome: Ongoing  Note: Patient afebrile. No signs or symptoms of infection noted.    Goal: Absence of new skin breakdown  Description: Absence of new skin breakdown  Outcome: Ongoing     Problem: Tissue Perfusion:  Goal: Ability to maintain adequate tissue perfusion will improve  Description: Ability to maintain adequate tissue perfusion will improve  Outcome: Ongoing  Goal: Ability to maintain a stable neurologic state will improve  Description: Ability to maintain a stable neurologic state will improve  Outcome: Ongoing

## 2022-04-15 NOTE — PROGRESS NOTES
Patient seen and examined family medicine resident team  Patient s/p EGD, no obvious source of bleeding  Hypokalemia, replacing lites  Had large volume paracentesis around 5 L of fluid was taken out negative for SBP  On Rocephin, although patient spiking low-grade fever  Urine culture is negative  Fever may be due to GI bleed, will discontinue antibiotics  Hemoglobin is better  Sodium improved, will give 1 more dose of Lasix  Full dictation to follow   Patient still critically sick, seen in ICU  CC time 32-minute

## 2022-04-15 NOTE — PLAN OF CARE
Pt seen post egd, no BM since procedure, hgb 7.6 he denies nausea and abdominal pain. Will advance to full liquids. If he starts having bloody BM again will need colonoscopy. D/w bedside RN . Lucius Salter Daymon Aschoff, APRN - CNP

## 2022-04-15 NOTE — PROGRESS NOTES
Patient hooked up to portable cardiac monitor and transferred down to surgery for EGD with house supervisor, BODØ.

## 2022-04-15 NOTE — PROGRESS NOTES
Physical Therapy        Physical Therapy Cancel Note      DATE: 4/15/2022    NAME: Dorcas Ortiz  MRN: [id number]   : 1960      Patient not seen this date for Physical Therapy due to:  Writer Attempts to initiate Eval; pt declining at this time d/t brief needing changed, but states he would like more time to have another BM possibly. Writer Offers assistance to mobilize, change brief, assist to toilet; pt continues to refuse. PT to follow and check back later as able.       Electronically signed by Paula Richey PT on 4/15/2022 at 1:35 PM

## 2022-04-15 NOTE — CONSULTS
[facility]   CONSULT NOTE:      DATE OF CONSULT 4/15/2022    REASON FOR CONSULTATION:   Anemia      PCP VASU Contreras     CHIEF COMPLAINT: SOB    HISTORY OF PRESENT ILLNESS:   57 y.o. male with pmhx of alcoholic liver disease, HepC cirrhosis and esophageal adenocarcinoma presented to ED from pain management. For the past 2 weeks he had a decreased appetite, abd pain, cough, dry heaving. He had no fever. He has hx of spinal stenosis and was going to get treatment and epidural steroid injxn, after he was unable to lie prone for the procedure due SOB and abd distension he was sent to the ED. In the ED he was anemic with Hgb of 3.6 and 3.5 on recheck. He was also iron deficient and hyponatremic at 120 with elevated bilirubin at 2.4. He was give a dose of Protonix and 1 unit PRBCs. He was then transferred to the floor. Pt evaluated/followed by GI and oncology. He had a paracentesis performed by IR to remove 5 liters. Testing is negative for SBP. On multiple rechecks pt hemoglobin continued to remain low subsequently continued to receive PRBC and iron. Total of 6 units of PRBCs since admission. Decision was then made to admit to the ICU. He had an EGD today that showed hypervascularity of the stomach with small amount of oozing at the cardia of the stomach but no active bleeding. Today he state he has a lot of energy. He does not have any pain. He is breathing well. He has noticed swelling in extremities. He is hungry. He has diarrhea. No vomiting. No fevers. He had a therapeutic paracentesis on 4/5/22 that removed 7.5L and was negative for SBP. Endoscopy on 1/21/22 showed severe portal HTN with gastropathy. No ulcer disease noted at that time. 2021 ultrasound was positive for cirrhosis with cholelithiasis. Being followed by [address] [place] [address] for liver transplant.        Pmhx: esophageal adenocarcinoma s/p chemoradiation, alcoholic liver disease, HepC, severe portal HTN gastropathy, Lopez's essophagus, Covid-19, BPH, Colon polyps, VitD deficiency, GERD, Hyperlipidemia  Pshx: TIPS  Smoking: Quit smoking 5 years ago, 45-pack year hx  Alcohol: Heavy drinking history. No use for a couple months. Rec Drugs: Cocaine, maurijuana. No use for several years. 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 ALLERGIES:  Allergies   Allergen Reactions    Bee Pollen Other (See Comments)     Runny nose, watery eyes    Pollen Extract      Runny nose, watery eyes       HOME MEDICATIONS:  Medications Prior to Admission: cyclobenzaprine (FLEXERIL) 10 MG tablet, Take 10 mg by mouth daily as needed for Muscle spasms  oxyCODONE-acetaminophen (PERCOCET) 5-325 MG per tablet, Take 1 tablet by mouth daily as needed for Pain for up to 30 days.   furosemide (LASIX) 20 MG tablet, take 1 tablet by mouth once daily  nadolol (CORGARD) 20 MG tablet, Take 1 tablet by mouth daily  ferrous sulfate (IRON 325) 325 (65 Fe) MG tablet, Take 1 tablet by mouth 2 times daily  omeprazole (PRILOSEC) 40 MG delayed release capsule, take 1 capsule by mouth EVERY MORNING BEFORE BREAKFAST  FLUoxetine (PROZAC) 20 MG capsule, take 1 capsule by mouth once daily  lidocaine-prilocaine (EMLA) 2.5-2.5 % cream, Apply topically 45-60 mins prior to needle poke daily PRN  atorvastatin (LIPITOR) 20 MG tablet, take 1 tablet by mouth at bedtime  spironolactone (ALDACTONE) 50 MG tablet, take 1 tablet by mouth once daily      PAST MEDICAL HISTORY:  Past Medical History:   Diagnosis Date    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 ([facility]     Anxiety     Arthritis     Back pain, chronic     dr. Deep Blackmon, orthopedic, every 3-4 months, gets steroid injection    Lezama esophagus     BPH (benign prostatic hypertrophy)     Cholelithiasis     Cirrhosis ([facility]     COVID-19 12/2020    pt reports he had a positive test while at [facility] in 2020, was asymptomatic    COVID-19 vaccine series completed 5/20/2021, 6/22/2021    Moderna 5/20/2021, 6/22/2021    DDD (degenerative disc disease), lumbar     Depression     Esophageal cancer ([facility]     INVASIVE ADENOCARCINOMA ARISING IN TUBULAR ADENOMA WITH HIGH GRADE DYSPLASIA, ASSOCIATED WITH FOCAL INTESTINAL METAPLASIA     Esophageal varices ([facility]     Fatty liver     GERD (gastroesophageal reflux disease)     Hep C w/o coma, chronic ([facility]     History of alcohol abuse     6-12 beers a day; quit drinking July 2016    History of blood transfusion     History of colon polyps 2016    History of tobacco abuse     [place] (hard of hearing)     Hyperlipidemia     Hypertension     Port-A-Cath in place     right upper chest    Sciatica     Spinal stenosis     Stomach ulcer     hx of    Tubular adenoma of colon 2016, 2018    Vitamin D deficiency     Wears glasses        PAST SURGICAL HISTORY:  Past Surgical History:   Procedure Laterality Date    BUNIONECTOMY      twice on right side    BUNIONECTOMY Left     CARPAL TUNNEL RELEASE Right     COLONOSCOPY      at age 35    COLONOSCOPY  10/05/2016    polyps-pathology tubular adenoma, and abnormal looking mucosa right colon-pathology-tubular adenoma    COLONOSCOPY N/A 3/30/2018    COLONOSCOPY POLYPECTOMY COLD BIOPSY performed by Jose Tompkins MD at [address] [place] [address]  03/30/2018    Small polyp in the sigmoid colon and excised with biopsy forceps--tubular adenoma    ENDOSCOPY, COLON, DIAGNOSTIC      EGD    IR PORT PLACEMENT EQUAL OR GREATER THAN 5 YEARS  4/19/2021    IR PORT PLACEMENT EQUAL OR GREATER THAN 5 YEARS 4/19/2021 [facility]    KNEE SURGERY Left     cyst removed    NASAL SEPTUM SURGERY      NERVE BLOCK Right 11/23/2020    NERVE BLOCK RIGHT CERVICAL STEROID INJECTION  C3-C6 performed by Juliana Self MD at [place] [address]  01/04/16    steroid injection C7 T1    OTHER SURGICAL HISTORY  11/21/2016    Bilateral Lumbar CACHORRO L4-L5 injections    OTHER SURGICAL HISTORY  12/19/2016    lumbar steroid injection    OTHER SURGICAL HISTORY  09/28/2018    BILATERAL L5 CACHORRO (N/A Back)    OTHER SURGICAL HISTORY Right 11/23/2020    cervical injection    PAIN MANAGEMENT PROCEDURE Left 7/9/2020    EPIDURAL STEROID INJECTION LEFT L4 L5 performed by Brock Lombardo MD at [facility] Left 7/20/2020    LEFT L4 L5 EPIDURAL STEROID INJECTION performed by Brock Lombardo MD at [facility] Bilateral 8/17/2020    LUMBAR FACET BILATERAL L2-L5 performed [facility] at [facility] Bilateral 12/7/2020    NERVE BLOCK BILATERAL LUMBAR MEDIAL BRANCH L2-L5 performed by Brock Lombardo MD at [address] AA&/STRD TFRML EPI LUMBAR/SACRAL 1 LEVEL Bilateral 9/6/2018    BILATERAL L5 CACHORRO performed by Brock Lombardo MD at [address] AA&/STRD TFRML EPI LUMBAR/SACRAL 1 LEVEL N/A 9/28/2018    BILATERAL L5 CACHORRO performed by Brock Lombardo MD at [facility] N/CARPAL TUNNEL SURG Right 8/29/2017    CARPAL TUNNEL RELEASE RIGHT performed by Margo Fischer MD at [facility] N/CARPAL TUNNEL SURG Left 10/31/2017    CARPAL TUNNEL RELEASE performed by Margo Fischer MD at [facility] 12/29/2020    EGD BIOPSY performed by Sonny Bolton MD at [address] [place] [address] N/A 2/2/2021    EGD BIOPSY and spot marking performed by Jorge Valentine MD at [address] [place] [address] N/A 2/12/2021    ENDOSCOPIC ULTRASOUND, EGD performed by Maryruth Dakin, MD at [address] [place] [address]  2/12/2021    EGD DIAGNOSTIC ONLY performed by Maryruth Dakin, MD at [address] [place] [address] N/A 8/31/2021    EGD BIOPSY performed by Jorge Valentine MD at [address] [place] [address] 1/21/2022    EGD BIOPSY performed by Jorge Valentine MD at [facility]    WISDOM TOOTH EXTRACTION            SOCIAL HISTORY:  Social History     Socioeconomic History    Marital status: Single     Spouse name: Not on file    Number of children: Not on file    Years of education: Not on file    Highest education level: Not on file   Occupational History    Not on file   Tobacco Use    Smoking status: Former Smoker     Packs/day: 1.00     Years: 45.00     Pack years: 45.00     Quit date: 2017     Years since quittin.2    Smokeless tobacco: Never Used   Vaping Use    Vaping Use: Never used   Substance and Sexual Activity    Alcohol use: Not Currently     Comment: quit     Drug use: Not Currently     Frequency: 1.0 times per week     Comment: cocaine,  stopped spring 2016    Sexual activity: Yes     Partners: Female   Other Topics Concern    Not on file   Social History Narrative     in the past, retired     Social Determinants of Health     Financial Resource Strain:     Difficulty of Paying Living Expenses: Not on file   Food Insecurity:     Worried About [address] in the Last Year: Not on file    [address] in the Last Year: Not on file   Transportation Needs:     Lack of Transportation (Medical): Not on file    Lack of Transportation (Non-Medical):  Not on file   Physical Activity:     Days of Exercise per Week: Not on file    Minutes of Exercise per Session: Not on file   Stress:     Feeling of Stress : Not on file   Social Connections:     Frequency of Communication with Friends and Family: Not on file    Frequency of Social Gatherings with Friends and Family: Not on file    Attends Sabianist Services: Not on file    Active Member of Clubs or Organizations: Not on file    Attends Club or Organization Meetings: Not on file    Marital Status: Not on file   Intimate Partner Violence:     Fear of Current or Ex-Partner: Not on file    Emotionally Abused: Not on file    Physically Abused: Not on file   Combs Sexually Abused: Not on file   Housing Stability:     Unable to Pay for Housing in the Last Year: Not on file    Number of Places Lived in the Last Year: Not on file    Unstable Housing in the Last Year: Not on file       FAMILY HISTORY:  Family History   Problem Relation Age of Onset    Cancer Mother         pancreatic    Cancer Father         bone    Diabetes Sister     Asthma Brother        REVIEW OF SYSTEMS:  All other systems reviewed and are negative. PHYSICAL EXAM:  Vital Signs Blood pressure (!) 113/55, pulse 85, temperature 97.8 °F (36.6 °C), temperature source Oral, resp. rate 20, weight 216 lb 4.3 oz (98.1 kg), SpO2 99 %. Oxygen Amount and Delivery: O2 Flow Rate (L/min): 0 L/min    Admission Weight Weight: 216 lb 4.3 oz (98.1 kg)     Drips  10 mcg/ml octreotide  25 mcg/ml magnesium sulfate  10 mcg/ml pantoprazole   D5 NS bolus     General Appearance Awake, active, talkative. No acute distress. Head  Normocephalic, without obvious abnormality, atraumatic  Eyes  conjunctivae/corneas clear. PERRL. ENT  Mouth clear. Uvula midline. Neck  no adenopathy, no JVD, supple, symmetrical, trachea midline and thyroid not enlarged, symmetric, no tenderness/mass/nodules  Lungs  CTA. No wheezes, rales, rhonchi. No retractions. No accessory muscle use. Heart: regular rate and rhythm, S1, S2 normal, no murmur, click, rub or gallop  Abdomen  full and firm. soft, non-tender; bowel sounds normal; no masses,  no organomegaly  Extremities 2+ pitting edema in the lower extremities. No cyanosis. Skin  Skin color, texture, turgor normal. No rashes or lesions  Neurologic: Alert and oriented X 3, normal strength and tone.          Imaging      Lab Review  CBC     Lab Results   Component Value Date    WBC 7.8 04/15/2022    RBC 2.89 04/15/2022    RBC 4.75 04/19/2012    HGB 7.6 04/15/2022    HCT 23.1 04/15/2022     04/15/2022     04/19/2012    MCV 79.9 04/15/2022    MCH 26.2 04/15/2022    MCHC 32.8 04/15/2022 RDW 18.6 04/15/2022    NRBC 1 04/13/2022    LYMPHOPCT 2 04/15/2022    MONOPCT 6 04/15/2022    MYELOPCT 1 06/01/2021    BASOPCT 0 04/15/2022    MONOSABS 0.47 04/15/2022    LYMPHSABS 0.16 04/15/2022    EOSABS 0.00 04/15/2022    BASOSABS 0.00 04/15/2022    DIFFTYPE NOT REPORTED 02/02/2022       BMP   Lab Results   Component Value Date     04/15/2022    K 3.2 04/15/2022    CL 93 04/15/2022    CO2 23 04/15/2022    BUN 27 04/15/2022    CREATININE 0.82 04/15/2022    GLUCOSE 158 04/15/2022    GLUCOSE 65 04/19/2012    CALCIUM 8.3 04/15/2022       LFTS  Lab Results   Component Value Date    ALKPHOS 134 04/13/2022    ALT 20 04/13/2022    AST 33 04/13/2022    PROT 5.2 04/13/2022    BILITOT 2.40 04/13/2022    BILIDIR 0.50 08/19/2021    IBILI 0.82 08/19/2021    LABALBU 2.9 04/13/2022    LABALBU 4.7 04/19/2012       INR  Recent Labs     04/13/22  1020   PROTIME 18.3*   INR 1.5       APTT  Recent Labs     04/13/22  1020   APTT 34.8       Lactic Acid  Lab Results   Component Value Date    LACTA 2.0 04/13/2022    LACTA 1.0 07/21/2016    LACTA 2.8 07/20/2016        PRO-BNP   No results for input(s): PROBNP in the last 72 hours. ABGs: No results found for: PHART, PO2ART, EMP6BME    No results found for: IFIO2, MODE, SETTIDVOL, SETPEEP      Impression  Anemia due to blood loss  R/o GI bleed  Portal hypertension  Alcohol chronic liver cirrhosis  Hyponatremia  Heavy drinking history  Former smoker    Plan:    Monitor H&H - transfuse PRBCs if drops below 7  D5 IV fluids  Monitor sodium  Octreotide, Magnesium sulfate, protononix drips  Consider colonoscopy to r/o lower GI bleed  No signs of infection - abx d/c  Follow up with GI and Oncology      Electronically signed ORVILLE Guevara-III 10:40 4/15/22  Patient seen and examined independently by me. Above discussed and I agree with medical student note except where indicated in the EMR revision history.  Also see my additional comments and changes indicated by discrete font, text color, italics, and/or initials. Labs, cultures, and radiographs where available were reviewed. Had dyspnea with severe anemia on presentation has received 6 units of packed red blood cells and 1 unit of FFP. Currently alert and oriented but on Protonix drip, Sandostatin drip, and still getting IV fluids. He says he quit drinking 1 month ago. He is not a transplant candidate because of his recent alcohol history and also most likely adenocarcinoma of the esophagus. Closely follow his hemoglobin and his sodium. We will check chest x-ray since none was done on this admission. We will see if there is any evidence of asbestosis. Would also suggest outpatient pulmonary function testing given his smoking and occupational history. Replace potassium. His abdomen has become distended again despite 2 paracentesis. Will discuss with GI the utility of another paracentesis, especially given the weekend.   Continue ICU monitoring, critical care time 35-minute  Discussed with patient and bedside nursing    Electronically signed by Jyothi Early MD on 4/15/2022 at 11:11 AM

## 2022-04-15 NOTE — ANESTHESIA PRE PROCEDURE
Department of Anesthesiology  Preprocedure Note       Name:  Subha Valdivia   Age:  57 y.o.  :  1960                                          MRN:  [id number]         Date:  4/15/2022      Surgeon: Parris Garcia):  Chantale Bennett MD    Procedure: Procedure(s):  EGD ESOPHAGOGASTRODUODENOSCOPY    Medications prior to admission:   Prior to Admission medications    Medication Sig Start Date End Date Taking? Authorizing Provider   cyclobenzaprine (FLEXERIL) 10 MG tablet Take 10 mg by mouth daily as needed for Muscle spasms    Historical Provider, MD   oxyCODONE-acetaminophen (PERCOCET) 5-325 MG per tablet Take 1 tablet by mouth daily as needed for Pain for up to 30 days. 22  MASSIMO Lopez CNP   furosemide (LASIX) 20 MG tablet take 1 tablet by mouth once daily 22   MASSIMO Vargas NP   nadolol (CORGARD) 20 MG tablet Take 1 tablet by mouth daily 22   Mohan Lin MD   ferrous sulfate (IRON 325) 325 (65 Fe) MG tablet Take 1 tablet by mouth 2 times daily 22   VASU Abrams Do   omeprazole (PRILOSEC) 40 MG delayed release capsule take 1 capsule by mouth EVERY MORNING BEFORE BREAKFAST 21   Panda Bolton MD   FLUoxetine (PROZAC) 20 MG capsule take 1 capsule by mouth once daily 21   VASU Abrams Do   lidocaine-prilocaine (EMLA) 2.5-2.5 % cream Apply topically 45-60 mins prior to needle poke daily PRN 21   Panda Bolton MD   atorvastatin (LIPITOR) 20 MG tablet take 1 tablet by mouth at bedtime 21   MASSIMO Guzman CNP   spironolactone (ALDACTONE) 50 MG tablet take 1 tablet by mouth once daily 21   MASSIMO Guzman CNP       Current medications:    No current facility-administered medications for this visit. No current outpatient medications on file.      Facility-Administered Medications Ordered in Other Visits   Medication Dose Route Frequency Provider Last Rate Last Admin    0.9 % sodium chloride infusion   IntraVENous PRN Lucian Harley MD        0.9 % sodium chloride infusion   IntraVENous PRN Kristen Sample, APRN - CNP        octreotide (SANDOSTATIN) 500 mcg in sodium chloride 0.9 % 100 mL infusion  50 mcg/hr IntraVENous Continuous Scar Danger, APRN - CNP 10 mL/hr at 04/14/22 2150 50 mcg/hr at 04/14/22 2150    0.9 % sodium chloride infusion   IntraVENous PRN Scar Danger, APRN - CNP        dextrose 5 % and 0.9 % sodium chloride infusion   IntraVENous Continuous Giacomo Coronado MD 50 mL/hr at 04/14/22 2605 Kaden Blake at 04/14/22 1954    0.9 % sodium chloride infusion   IntraVENous PRN Scar Trevino, APRN - CNP        0.9 % sodium chloride infusion   IntraVENous PRN Lucian Harley MD        magnesium sulfate 1000 mg in dextrose 5% 100 mL IVPB  1,000 mg IntraVENous PRN Don Corey MD        0.9 % sodium chloride infusion   IntraVENous PRN Sarah Beth Bundy MD        sodium chloride flush 0.9 % injection 5-40 mL  5-40 mL IntraVENous 2 times per day Hay Galvez MD   10 mL at 04/14/22 1946    sodium chloride flush 0.9 % injection 5-40 mL  5-40 mL IntraVENous PRN Hay Galvez MD        0.9 % sodium chloride infusion  25 mL IntraVENous PRN Hay Galvez MD        ondansetron (ZOFRAN-ODT) disintegrating tablet 4 mg  4 mg Oral Q8H PRN Hay Galvez MD        Or    ondansetron [facility]) injection 4 mg  4 mg IntraVENous Q6H PRN Hay Galvez MD   4 mg at 04/14/22 0117    polyethylene glycol (GLYCOLAX) packet 17 g  17 g Oral Daily PRN Hay Galvez MD        hydrOXYzine (ATARAX) tablet 10 mg  10 mg Oral Once Hay Galvez MD        midodrine (PROAMATINE) tablet 5 mg  5 mg Oral TID PRN Don Corey MD        atorvastatin (LIPITOR) tablet 20 mg  20 mg Oral Nightly Don Corey MD        FLUoxetine (PROZAC) capsule 20 mg  20 mg Oral Daily Don Corey MD   20 mg at 04/14/22 0828    sodium chloride flush 0.9 % injection 5-40 mL  5-40 mL IntraVENous BID Don Corey MD   10 mL at 04/14/22 1945    rifaximin Hannah Kil) tablet 550 mg  550 mg Oral BID Hany Safe, APRN - CNP   550 mg at 04/14/22 0827    lactulose (CHRONULAC) 10 GM/15ML solution 20 g  20 g Oral TID Hany Safe, APRN - CNP   20 g at 04/14/22 0828    cefTRIAXone (ROCEPHIN) 2000 mg IVPB in D5W 50ml minibag  2,000 mg IntraVENous Q24H Michelle Curtis MD   Stopped at 04/14/22 1735    pantoprazole (PROTONIX) 80 mg in sodium chloride 0.9 % 100 mL infusion  8 mg/hr IntraVENous Continuous Michelle Curtis MD 10 mL/hr at 04/14/22 2241 8 mg/hr at 04/14/22 2241    0.9 % sodium chloride infusion   IntraVENous PRN Michelle Curtis MD        iron sucrose (VENOFER) 200 mg in sodium chloride 0.9 % 100 mL IVPB  200 mg IntraVENous Q24H Marion Barajas MD   Stopped at 04/14/22 2136    oxyCODONE-acetaminophen (PERCOCET) 5-325 MG per tablet 1 tablet  1 tablet Oral Daily PRN Makenzie Valadez APRN - CNP           Allergies: Allergies   Allergen Reactions    Bee Pollen Other (See Comments)     Runny nose, watery eyes    Pollen Extract      Runny nose, watery eyes       Problem List:    Patient Active Problem List   Diagnosis Code    Back pain, chronic M54.9, G89.29    Hearing difficulty H91.90    GERD (gastroesophageal reflux disease) K21.9    Cervical radicular pain M54.12    Alcohol withdrawal syndrome without complication (HCC) E17.929    Hyponatremia E87.1    Hypomagnesemia E83.42    Chronic viral hepatitis B without delta agent and without coma (HCC) B18.1    Calculus of gallbladder without cholecystitis K80.20    Hep C w/o coma, chronic (HCC) B18.2    Fatty liver K76.0    Psychophysiologic insomnia F51.04    Cirrhosis (HCC) K74.60    Hepatic cirrhosis (HCC) K74.60    Vertebrogenic low back pain M54.51    DDD (degenerative disc disease), lumbar M51.36    Depression F32. A    Tubular adenoma of colon D12.6    History of colon polyps Z86.010    Gynecomastia, male N58    Lumbar radiculitis M54.16    Lumbar disc herniation M51.26    Tinnitus H93.19    Eustachian tube dysfunction H69.80    Ganglion cyst M67.40    Carpal tunnel syndrome of right wrist G56.01    History of hepatitis C Z86.19    Vitamin D deficiency E55.9    Pure hypercholesterolemia E78.00    Hypokalemia E87.6    Essential hypertension I10    Recurrent major depressive disorder in partial remission (HCC) F33.41    S/P epidural steroid injection Z92.241    Elevated LFTs R79.89    Seasonal allergies J30.2    Lumbar facet arthropathy M47.816    Cervical facet syndrome M47.812    Acute gastrointestinal bleeding K92.2    Thrombocytopenia (HCC) D69.6    Hepatitis C virus infection resolved after antiviral drug therapy Z86.19    Gastrointestinal hemorrhage with melena K92.1    Alcohol abuse F10.10    Altered mental status R41.82    Hypocalcemia E83.51    Hypophosphatemia E83.39    Malignant neoplasm of lower third of esophagus (HCC) C15.5    COVID-19 U07.1    Anxiety F41.9    Current smoker F17.200    Severe comorbid illness R69    Gait instability R26.81    Abnormal findings on diagnostic imaging of spine R93.7    Cervical spinal stenosis M48.02    Spinal stenosis of lumbar region with neurogenic claudication M48.062    Low hemoglobin D64.9    Symptomatic anemia, microcytic, acute D64.9    Hypotension I95.9    Former smoker, 50+ pack years, quit 2016 Z87.891    HLD (hyperlipidemia) E78.5    Esophageal adenocarcinoma (HCC) C15.9    Anemia D64.9    Acute kidney injury ([facility] [address] N17.9       Past Medical History:        Diagnosis Date    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)     Anxiety     Arthritis     Back pain, chronic     dr. Christina Valentino, orthopedic, every 3-4 months, gets steroid injection    Lezama esophagus     BPH (benign prostatic hypertrophy)     Cholelithiasis     Cirrhosis ([facility] [address]     COVID-19 12/2020    pt reports he had a positive test while at [facility] in 2020, was asymptomatic    COVID-19 vaccine series completed 5/20/2021, 6/22/2021    Moderna 5/20/2021, 6/22/2021    DDD (degenerative disc disease), lumbar     Depression     Esophageal cancer (HCC)     INVASIVE ADENOCARCINOMA ARISING IN TUBULAR ADENOMA WITH HIGH GRADE DYSPLASIA, ASSOCIATED WITH FOCAL INTESTINAL METAPLASIA     Esophageal varices (HCC)     Fatty liver     GERD (gastroesophageal reflux disease)     Hep C w/o coma, chronic (HCC)     History of alcohol abuse     6-12 beers a day; quit drinking July 2016    History of blood transfusion     History of colon polyps 2016    History of tobacco abuse     [place] (hard of hearing)     Hyperlipidemia     Hypertension     Port-A-Cath in place     right upper chest    Sciatica     Spinal stenosis     Stomach ulcer     hx of    Tubular adenoma of colon 2016, 2018    Vitamin D deficiency     Wears glasses        Past Surgical History:        Procedure Laterality Date    BUNIONECTOMY      twice on right side    BUNIONECTOMY Left     CARPAL TUNNEL RELEASE Right     COLONOSCOPY      at age 35    COLONOSCOPY  10/05/2016    polyps-pathology tubular adenoma, and abnormal looking mucosa right colon-pathology-tubular adenoma    COLONOSCOPY N/A 3/30/2018    COLONOSCOPY POLYPECTOMY COLD BIOPSY performed by Jose Tompkins MD at [address] [facility]  03/30/2018    Small polyp in the sigmoid colon and excised with biopsy forceps--tubular adenoma    ENDOSCOPY, COLON, DIAGNOSTIC      EGD    IR PORT PLACEMENT EQUAL OR GREATER THAN 5 YEARS  4/19/2021    IR PORT PLACEMENT EQUAL OR GREATER THAN 5 YEARS 4/19/2021 [facility] PROCED[facility]    KNEE SURGERY Left     cyst removed    NASAL SEPTUM SURGERY      NERVE BLOCK Right 11/23/2020    NERVE BLOCK RIGHT CERVICAL STEROID INJECTION  C3-C6 performed by Juliana Self MD at [address]  01/04/16    steroid injection C7 T1    OTHER SURGICAL HISTORY  11/21/2016    Bilateral Lumbar CACHORRO L4-L5 injections    OTHER SURGICAL HISTORY  12/19/2016    lumbar steroid injection    OTHER SURGICAL HISTORY  09/28/2018    BILATERAL L5 CACHORRO (N/A Back)    OTHER SURGICAL HISTORY Right 11/23/2020    cervical injection    PAIN MANAGEMENT PROCEDURE Left 7/9/2020    EPIDURAL STEROID INJECTION LEFT L4 L5 performed by Rigoberto Alexandre MD at [address] [facility] Left 7/20/2020    LEFT L4 L5 EPIDURAL STEROID INJECTION performed by Rigoberto Alexandre MD at [address] [facility] Bilateral 8/17/2020    LUMBAR FACET BILATERAL L2-L5 performed by Rigoberto Alexandre MD at [address] Bilateral 12/7/2020    NERVE BLOCK BILATERAL LUMBAR MEDIAL BRANCH L2-L5 performed by Rigoberto Alexandre MD at [address] AA&/STRD TFRML EPI LUMBAR/SACRAL 1 LEVEL Bilateral 9/6/2018    BILATERAL L5 CACHORRO performed by Rigoberto Alexandre MD at [address] AA&/STRD TFRML EPI LUMBAR/SACRAL 1 LEVEL N/A 9/28/2018    BILATERAL L5 CACHORRO performed by Rigoberto Alexandre MD at [address] [facility] N/CARPAL TUNNEL SURG Right 8/29/2017    CARPAL TUNNEL RELEASE RIGHT performed by Garrett Hudson MD at [address] [facility] N/CARPAL TUNNEL SURG Left 10/31/2017    CARPAL TUNNEL RELEASE performed by Garrett Hudson MD at [address] [place] [address] 12/29/2020    EGD BIOPSY performed by Gen Vallejo MD at [address] [place] [address] N/A 2/2/2021    EGD BIOPSY and spot marking performed by Hema Jones MD at [address] [place] [address] N/A 2/12/2021    ENDOSCOPIC ULTRASOUND, EGD performed by Andrea Daniel MD at [address] [place] [address]  2/12/2021    EGD DIAGNOSTIC ONLY performed by Andrea Daniel MD at [address] [place] [address] N/A 8/31/2021    EGD BIOPSY performed by Hema Jones MD at [address] [place] [address] 1/21/2022    EGD BIOPSY performed by Hema Jones MD at [facility] History: Social History     Tobacco Use    Smoking status: Former Smoker     Packs/day: 1.00     Years: 45.00     Pack years: 45.00     Quit date: 2017     Years since quittin.2    Smokeless tobacco: Never Used   Substance Use Topics    Alcohol use: Not Currently     Comment: quit 2019                                Counseling given: Not Answered      Vital Signs (Current): There were no vitals filed for this visit. BP Readings from Last 3 Encounters:   04/15/22 112/72   22 (!) 120/56   22 110/72       NPO Status:                                                                                 BMI:   Wt Readings from Last 3 Encounters:   22 210 lb (95.3 kg)   22 198 lb (89.8 kg)   22 215 lb (97.5 kg)     There is no height or weight on file to calculate BMI.    CBC:   Lab Results   Component Value Date    WBC 16.4 2022    RBC 2.38 2022    RBC 4.75 2012    HGB 6.8 04/15/2022    HCT 21.0 04/15/2022    MCV 76.0 2022    RDW 20.0 2022     2022     2012       CMP:   Lab Results   Component Value Date     2022    K 5.2 2022    CL 88 2022    CO2 25 2022    BUN 41 2022    CREATININE 1.32 2022    GFRAA >60 2022    LABGLOM 55 2022    GLUCOSE 99 2022    GLUCOSE 65 2012    PROT 5.2 2022    CALCIUM 8.6 2022    BILITOT 2.40 2022    ALKPHOS 134 2022    AST 33 2022    ALT 20 2022       POC Tests: No results for input(s): POCGLU, POCNA, POCK, POCCL, POCBUN, POCHEMO, POCHCT in the last 72 hours.     Coags:   Lab Results   Component Value Date    PROTIME 18.3 2022    INR 1.5 2022    APTT 34.8 2022       HCG (If Applicable): No results found for: PREGTESTUR, PREGSERUM, HCG, HCGQUANT     ABGs: No results found for: PHART, PO2ART, IFQ3ECP, POT4BXH, BEART, G3FWBUDC     Type & Screen (If Applicable):  No results found for: LABABO, [address]    Drug/Infectious Status (If Applicable):  Lab Results   Component Value Date    HEPCAB REACTIVE 12/23/2020       COVID-19 Screening (If Applicable):   Lab Results   Component Value Date    COVID19 Not Detected 04/14/2022    COVID19 Not Detected 02/02/2022    COVID19 Not Detected 07/17/2020           Anesthesia Evaluation  Patient summary reviewed and Nursing notes reviewed no history of anesthetic complications:   Airway: Mallampati: II  TM distance: >3 FB   Neck ROM: full  Mouth opening: > = 3 FB Dental:    (+) poor dentition      Pulmonary: breath sounds clear to auscultation                             Cardiovascular:    (+) hypertension: no interval change, dysrhythmias:, hyperlipidemia      ECG reviewed  Rhythm: irregular  Rate: abnormal  Echocardiogram reviewed               ROS comment: Left ventricle is normal in size. Mild left ventricular hypertrophy. Global left ventricular systolic function is normal. Estimated LV EF 60-65%. Neuro/Psych:   (+) neuromuscular disease:, psychiatric history:            GI/Hepatic/Renal:   (+) GERD: no interval change, PUD, hepatitis: C, liver disease: esophageal varices,          ROS comment: Liver cirrhosis. Endo/Other:    (+) blood dyscrasia: anemia:., malignancy/cancer (esophageal). Abdominal:             Vascular: negative vascular ROS. Other Findings:             Anesthesia Plan      general     ASA 4 - emergent       Induction: intravenous. MIPS: Prophylactic antiemetics administered. Anesthetic plan and risks discussed with patient.                       Jaspreet Bethea MD   4/15/2022

## 2022-04-15 NOTE — PLAN OF CARE
Called by rn stating that pt had a large bm with blood clots. Will order stat hgb and make npo. Will update md .. Corby Greenfield, MASSIMO - CNP

## 2022-04-15 NOTE — CARE COORDINATION
Writer was asked to speak to Pt's Sister, Jay Savage, in regards to DC plans/options. Informed her of Plan of Home w/ [facility], [facility], [facility]. She would also like assistance w/ Mobile Meals, if/when he gets out of SNF. Writer did add this to the [address]. Nilam, did understand DC Plan & she was informed, we will continue to follow for any needs on a daily basis.

## 2022-04-16 ENCOUNTER — ANESTHESIA EVENT (OUTPATIENT)
Dept: OPERATING ROOM | Age: 62
DRG: 378 | End: 2022-04-16
Payer: MEDICARE

## 2022-04-16 ENCOUNTER — ANESTHESIA (OUTPATIENT)
Dept: OPERATING ROOM | Age: 62
DRG: 378 | End: 2022-04-16
Payer: MEDICARE

## 2022-04-16 VITALS — DIASTOLIC BLOOD PRESSURE: 58 MMHG | OXYGEN SATURATION: 100 % | SYSTOLIC BLOOD PRESSURE: 100 MMHG

## 2022-04-16 LAB
ABSOLUTE BANDS #: 0.08 K/UL (ref 0–1)
ABSOLUTE EOS #: 0.08 K/UL (ref 0–0.4)
ABSOLUTE LYMPH #: 0.25 K/UL (ref 1–4.8)
ABSOLUTE MONO #: 0.91 K/UL (ref 0.1–1.3)
ANION GAP SERPL CALCULATED.3IONS-SCNC: 10 MMOL/L (ref 9–17)
ANION GAP SERPL CALCULATED.3IONS-SCNC: 10 MMOL/L (ref 9–17)
BANDS: 1 % (ref 0–10)
BASOPHILS # BLD: 0 % (ref 0–2)
BASOPHILS ABSOLUTE: 0 K/UL (ref 0–0.2)
BUN BLDV-MCNC: 12 MG/DL (ref 8–23)
BUN BLDV-MCNC: 13 MG/DL (ref 8–23)
CALCIUM SERPL-MCNC: 7.7 MG/DL (ref 8.6–10.4)
CALCIUM SERPL-MCNC: 8 MG/DL (ref 8.6–10.4)
CHLORIDE BLD-SCNC: 96 MMOL/L (ref 98–107)
CHLORIDE BLD-SCNC: 98 MMOL/L (ref 98–107)
CO2: 20 MMOL/L (ref 20–31)
CO2: 23 MMOL/L (ref 20–31)
CREAT SERPL-MCNC: 0.57 MG/DL (ref 0.7–1.2)
CREAT SERPL-MCNC: <0.4 MG/DL (ref 0.7–1.2)
EOSINOPHILS RELATIVE PERCENT: 1 % (ref 0–4)
GFR AFRICAN AMERICAN: >60 ML/MIN
GFR AFRICAN AMERICAN: ABNORMAL ML/MIN
GFR NON-AFRICAN AMERICAN: >60 ML/MIN
GFR NON-AFRICAN AMERICAN: ABNORMAL ML/MIN
GFR SERPL CREATININE-BSD FRML MDRD: ABNORMAL ML/MIN/{1.73_M2}
GFR SERPL CREATININE-BSD FRML MDRD: ABNORMAL ML/MIN/{1.73_M2}
GLUCOSE BLD-MCNC: 131 MG/DL (ref 70–99)
GLUCOSE BLD-MCNC: 133 MG/DL (ref 70–99)
HCT VFR BLD CALC: 27.9 % (ref 41–53)
HCT VFR BLD CALC: 29.6 % (ref 41–53)
HCT VFR BLD CALC: 30.1 % (ref 41–53)
HEMOGLOBIN: 8.7 G/DL (ref 13.5–17.5)
HEMOGLOBIN: 9.2 G/DL (ref 13.5–17.5)
HEMOGLOBIN: 9.6 G/DL (ref 13.5–17.5)
LYMPHOCYTES # BLD: 3 % (ref 24–44)
MAGNESIUM: 2.1 MG/DL (ref 1.6–2.6)
MCH RBC QN AUTO: 27.3 PG (ref 26–34)
MCHC RBC AUTO-ENTMCNC: 32.8 G/DL (ref 31–37)
MCV RBC AUTO: 83 FL (ref 80–100)
MONOCYTES # BLD: 11 % (ref 1–7)
MORPHOLOGY: ABNORMAL
MORPHOLOGY: ABNORMAL
PDW BLD-RTO: 18.9 % (ref 11.5–14.9)
PLATELET # BLD: 83 K/UL (ref 150–450)
PMV BLD AUTO: 6.6 FL (ref 6–12)
POTASSIUM SERPL-SCNC: 3.3 MMOL/L (ref 3.7–5.3)
POTASSIUM SERPL-SCNC: ABNORMAL MMOL/L (ref 3.7–5.3)
RBC # BLD: 3.37 M/UL (ref 4.5–5.9)
REASON FOR REJECTION: NORMAL
SEG NEUTROPHILS: 84 % (ref 36–66)
SEGMENTED NEUTROPHILS ABSOLUTE COUNT: 6.98 K/UL (ref 1.3–9.1)
SODIUM BLD-SCNC: 126 MMOL/L (ref 135–144)
SODIUM BLD-SCNC: 128 MMOL/L (ref 135–144)
SODIUM BLD-SCNC: 131 MMOL/L (ref 135–144)
SODIUM BLD-SCNC: 132 MMOL/L (ref 135–144)
WBC # BLD: 8.3 K/UL (ref 3.5–11)
ZZ NTE CLEAN UP: ORDERED TEST: NORMAL
ZZ NTE WITH NAME CLEAN UP: SPECIMEN SOURCE: NORMAL

## 2022-04-16 PROCEDURE — 3609010300 HC COLONOSCOPY W/BIOPSY SINGLE/MULTIPLE: Performed by: INTERNAL MEDICINE

## 2022-04-16 PROCEDURE — 36415 COLL VENOUS BLD VENIPUNCTURE: CPT

## 2022-04-16 PROCEDURE — 85018 HEMOGLOBIN: CPT

## 2022-04-16 PROCEDURE — 83735 ASSAY OF MAGNESIUM: CPT

## 2022-04-16 PROCEDURE — 85025 COMPLETE CBC W/AUTO DIFF WBC: CPT

## 2022-04-16 PROCEDURE — 6370000000 HC RX 637 (ALT 250 FOR IP): Performed by: INTERNAL MEDICINE

## 2022-04-16 PROCEDURE — 6360000002 HC RX W HCPCS: Performed by: INTERNAL MEDICINE

## 2022-04-16 PROCEDURE — 2580000003 HC RX 258: Performed by: ANESTHESIOLOGY

## 2022-04-16 PROCEDURE — 3700000000 HC ANESTHESIA ATTENDED CARE: Performed by: INTERNAL MEDICINE

## 2022-04-16 PROCEDURE — 99232 SBSQ HOSP IP/OBS MODERATE 35: CPT | Performed by: INTERNAL MEDICINE

## 2022-04-16 PROCEDURE — 2500000003 HC RX 250 WO HCPCS: Performed by: ANESTHESIOLOGY

## 2022-04-16 PROCEDURE — 2000000000 HC ICU R&B

## 2022-04-16 PROCEDURE — 0DBN8ZX EXCISION OF SIGMOID COLON, VIA NATURAL OR ARTIFICIAL OPENING ENDOSCOPIC, DIAGNOSTIC: ICD-10-PCS | Performed by: INTERNAL MEDICINE

## 2022-04-16 PROCEDURE — 6360000002 HC RX W HCPCS: Performed by: ANESTHESIOLOGY

## 2022-04-16 PROCEDURE — 36430 TRANSFUSION BLD/BLD COMPNT: CPT

## 2022-04-16 PROCEDURE — 2580000003 HC RX 258: Performed by: INTERNAL MEDICINE

## 2022-04-16 PROCEDURE — C9113 INJ PANTOPRAZOLE SODIUM, VIA: HCPCS | Performed by: INTERNAL MEDICINE

## 2022-04-16 PROCEDURE — 84295 ASSAY OF SERUM SODIUM: CPT

## 2022-04-16 PROCEDURE — 7100000000 HC PACU RECOVERY - FIRST 15 MIN: Performed by: INTERNAL MEDICINE

## 2022-04-16 PROCEDURE — 99233 SBSQ HOSP IP/OBS HIGH 50: CPT | Performed by: INTERNAL MEDICINE

## 2022-04-16 PROCEDURE — 3700000001 HC ADD 15 MINUTES (ANESTHESIA): Performed by: INTERNAL MEDICINE

## 2022-04-16 PROCEDURE — 45380 COLONOSCOPY AND BIOPSY: CPT | Performed by: INTERNAL MEDICINE

## 2022-04-16 PROCEDURE — 88305 TISSUE EXAM BY PATHOLOGIST: CPT

## 2022-04-16 PROCEDURE — 7100000001 HC PACU RECOVERY - ADDTL 15 MIN: Performed by: INTERNAL MEDICINE

## 2022-04-16 PROCEDURE — 0DBK8ZX EXCISION OF ASCENDING COLON, VIA NATURAL OR ARTIFICIAL OPENING ENDOSCOPIC, DIAGNOSTIC: ICD-10-PCS | Performed by: INTERNAL MEDICINE

## 2022-04-16 PROCEDURE — 80048 BASIC METABOLIC PNL TOTAL CA: CPT

## 2022-04-16 PROCEDURE — P9016 RBC LEUKOCYTES REDUCED: HCPCS

## 2022-04-16 PROCEDURE — 85014 HEMATOCRIT: CPT

## 2022-04-16 PROCEDURE — 2709999900 HC NON-CHARGEABLE SUPPLY: Performed by: INTERNAL MEDICINE

## 2022-04-16 RX ORDER — OXYCODONE HYDROCHLORIDE 5 MG/1
5 TABLET ORAL EVERY 6 HOURS PRN
Status: DISCONTINUED | OUTPATIENT
Start: 2022-04-16 | End: 2022-04-21 | Stop reason: HOSPADM

## 2022-04-16 RX ORDER — PROPOFOL 10 MG/ML
INJECTION, EMULSION INTRAVENOUS PRN
Status: DISCONTINUED | OUTPATIENT
Start: 2022-04-16 | End: 2022-04-16 | Stop reason: SDUPTHER

## 2022-04-16 RX ORDER — SODIUM CHLORIDE 9 MG/ML
INJECTION, SOLUTION INTRAVENOUS CONTINUOUS PRN
Status: DISCONTINUED | OUTPATIENT
Start: 2022-04-16 | End: 2022-04-16 | Stop reason: SDUPTHER

## 2022-04-16 RX ORDER — SODIUM CHLORIDE 9 MG/ML
INJECTION, SOLUTION INTRAVENOUS PRN
Status: DISCONTINUED | OUTPATIENT
Start: 2022-04-16 | End: 2022-04-21 | Stop reason: HOSPADM

## 2022-04-16 RX ORDER — LIDOCAINE HYDROCHLORIDE 10 MG/ML
INJECTION, SOLUTION EPIDURAL; INFILTRATION; INTRACAUDAL; PERINEURAL PRN
Status: DISCONTINUED | OUTPATIENT
Start: 2022-04-16 | End: 2022-04-16 | Stop reason: SDUPTHER

## 2022-04-16 RX ADMIN — OXYCODONE HYDROCHLORIDE 5 MG: 5 TABLET ORAL at 20:43

## 2022-04-16 RX ADMIN — OXYCODONE HYDROCHLORIDE AND ACETAMINOPHEN 1 TABLET: 5; 325 TABLET ORAL at 06:05

## 2022-04-16 RX ADMIN — ATORVASTATIN CALCIUM 20 MG: 20 TABLET, FILM COATED ORAL at 20:43

## 2022-04-16 RX ADMIN — SODIUM CHLORIDE 8 MG/HR: 9 INJECTION, SOLUTION INTRAVENOUS at 20:45

## 2022-04-16 RX ADMIN — SODIUM CHLORIDE: 9 INJECTION, SOLUTION INTRAVENOUS at 13:29

## 2022-04-16 RX ADMIN — POTASSIUM CHLORIDE 40 MEQ: 20 TABLET, EXTENDED RELEASE ORAL at 14:45

## 2022-04-16 RX ADMIN — SODIUM CHLORIDE, PRESERVATIVE FREE 10 ML: 5 INJECTION INTRAVENOUS at 20:58

## 2022-04-16 RX ADMIN — PROPOFOL 25 MG: 10 INJECTION, EMULSION INTRAVENOUS at 13:51

## 2022-04-16 RX ADMIN — RIFAXIMIN 550 MG: 550 TABLET ORAL at 20:43

## 2022-04-16 RX ADMIN — PROPOFOL 50 MG: 10 INJECTION, EMULSION INTRAVENOUS at 13:33

## 2022-04-16 RX ADMIN — IRON SUCROSE 200 MG: 20 INJECTION, SOLUTION INTRAVENOUS at 20:46

## 2022-04-16 RX ADMIN — SODIUM CHLORIDE, PRESERVATIVE FREE 10 ML: 5 INJECTION INTRAVENOUS at 20:44

## 2022-04-16 RX ADMIN — PANTOPRAZOLE SODIUM 8 MG/HR: 40 INJECTION, POWDER, FOR SOLUTION INTRAVENOUS at 11:52

## 2022-04-16 RX ADMIN — LIDOCAINE HYDROCHLORIDE 20 MG: 10 INJECTION, SOLUTION EPIDURAL; INFILTRATION; INTRACAUDAL; PERINEURAL at 13:33

## 2022-04-16 RX ADMIN — OCTREOTIDE ACETATE 50 MCG/HR: 500 INJECTION, SOLUTION INTRAVENOUS; SUBCUTANEOUS at 11:52

## 2022-04-16 RX ADMIN — PROPOFOL 25 MG: 10 INJECTION, EMULSION INTRAVENOUS at 13:46

## 2022-04-16 RX ADMIN — PROPOFOL 25 MG: 10 INJECTION, EMULSION INTRAVENOUS at 13:42

## 2022-04-16 RX ADMIN — PANTOPRAZOLE SODIUM 8 MG/HR: 40 INJECTION, POWDER, FOR SOLUTION INTRAVENOUS at 00:16

## 2022-04-16 RX ADMIN — PROPOFOL 50 MG: 10 INJECTION, EMULSION INTRAVENOUS at 13:37

## 2022-04-16 RX ADMIN — PROPOFOL 50 MG: 10 INJECTION, EMULSION INTRAVENOUS at 13:31

## 2022-04-16 ASSESSMENT — ENCOUNTER SYMPTOMS
STRIDOR: 0
VOMITING: 0
ABDOMINAL DISTENTION: 1
NAUSEA: 0
ABDOMINAL PAIN: 0
BLOOD IN STOOL: 0
SHORTNESS OF BREATH: 0
CONSTIPATION: 0

## 2022-04-16 ASSESSMENT — PULMONARY FUNCTION TESTS
PIF_VALUE: 1
PIF_VALUE: 0
PIF_VALUE: 1

## 2022-04-16 ASSESSMENT — PAIN SCALES - GENERAL
PAINLEVEL_OUTOF10: 0
PAINLEVEL_OUTOF10: 10
PAINLEVEL_OUTOF10: 0
PAINLEVEL_OUTOF10: 0
PAINLEVEL_OUTOF10: 10
PAINLEVEL_OUTOF10: 0
PAINLEVEL_OUTOF10: 0
PAINLEVEL_OUTOF10: 10

## 2022-04-16 ASSESSMENT — PAIN DESCRIPTION - LOCATION: LOCATION: HEAD

## 2022-04-16 ASSESSMENT — PAIN DESCRIPTION - PAIN TYPE: TYPE: ACUTE PAIN

## 2022-04-16 NOTE — PROGRESS NOTES
[address] [facility].    PROGRESS NOTE             4/16/2022    9:13 AM    Name:   Ember Juarez  MRN:     [id number]     Acct:      [de-identified]   Room:   2007/2007-01  IP Day:  3  Admit Date:  4/13/2022 10:10 AM    PCP:  VASU Meyer  Code Status:  Full Code    Subjective:     C/C:   Chief Complaint   Patient presents with    Shortness of Breath     Interval History Status: not changed. Overnight, patient had a few episodes of bloody stool. Hemoglobin 6.4, received an additional unit of blood. Patient denies seeing any bloody stools throughout this admission. Patient is frustrated but understands the need for the scopes and the severity of his condition. Patient seen drinking GoLytely colonoscopy prep and sitting on the bedside commode. Plan for colonoscopy at 1 PM.    Brief History:     The patient is a 58 y.o.  Non- / non  male with PMH of alcoholic liver disease/cirrhosis, history of hepatitis C (treated).  He is a former smoker with 50+ pack year history, and admits to former heavy alcohol use.  Denies illicit or IV drug use.  Patient also has a history of GE junction adenocarcinoma s/p chemoradiation completed 6/9/21 and PET 7/23/21 with no recurrence, as well as most recent EGD on 1/21/22 which was negative for malignanacy.  This EGD also showed severe portal hypertensive gastropathy with some oozing of blood visualized.     He presented to the emergency department today from pain management clinic where he was scheduled to have an epidural steroid injection for back pain due to spinal stenosis.  However, he was unable to lie prone due to abdominal distention and shortness of breath and he was sent to the emergency department.      According to the patient, he was scheduled for therapeutic paracentesis today at [facility] this afternoon.  His most recent paracentesis was 4/5/2022 with 7.5 L removed.  Per patient, he states that it accumulated quickly afterwards and came back \"worse. \"  This was his second time having paracentesis done; previous paracentesis done in 2016.  He states that he has not been taking his spironolactone for the past few days, as he felt that it was making his blood pressure too low.  He reports that he took all other home medications, though.     Currently, patient states he has some shortness of breath which she attributes to abdominal distention, as well as nausea. However, denies  fever, chills, chest pain, abdominal pain, hematuria, or dark/bloody stool.  Last bowel movement was yesterday     In the emergency department, hemoglobin was found to be 3.6, recheck 3.5. Sodium 120.  Creatinine 1.42, baseline within normal limits.  Total bili 2.4.  INR 1.5.       He was given a dose of Protonix and 1 unit PRBCs in the ED.     He is being admitted to the hospital for the management of severe anemia and sepsis.     4/13: pt received 3 units PRBCs, venofer added per heme/onc     4/14: IR guided paracentesis 5L removed, FOBT positive, bowel movement revealed bright red blood per rectum; hgb 6.9 despite receiving 6 units of blood and FFP, emergent EGD overnight following additional episodes of hematochezia, no annamaria bleeding noted, may need colonoscopy    4/15: more bloody stools, plan for colonoscopy tomorrow, may need nuclear bleeding scan, as well    Review of Systems:     Review of Systems   Constitutional: Negative for chills and fever. Respiratory: Negative for shortness of breath. Cardiovascular: Negative for chest pain. Gastrointestinal: Positive for abdominal distention. Negative for abdominal pain, blood in stool (reported by RN that pt had several bloody bowel movements), constipation, nausea and vomiting. All other systems reviewed and are negative. Medications: Allergies:     Allergies   Allergen Reactions    Bee Pollen Other (See Comments)     Runny nose, watery eyes    Pollen Extract      Runny nose, watery eyes       Current Meds:   Scheduled Meds:    sodium chloride flush  5-40 mL IntraVENous 2 times per day    atorvastatin  20 mg Oral Nightly    [Held by provider] FLUoxetine  20 mg Oral Daily    sodium chloride flush  5-40 mL IntraVENous BID    rifaximin  550 mg Oral BID    lactulose  20 g Oral TID    iron sucrose  200 mg IntraVENous Q24H     Continuous Infusions:    sodium chloride      octreotide (SandoSTATIN) infusion 50 mcg/hr (04/15/22 0707)    dextrose 5 % and 0.9 % NaCl 50 mL/hr at 04/15/22 1803    sodium chloride      sodium chloride      pantoprazole 8 mg/hr (04/16/22 0016)     PRN Meds: sodium chloride, potassium chloride **OR** potassium alternative oral replacement **OR** potassium chloride, magnesium sulfate, sodium chloride, sodium chloride flush, sodium chloride, ondansetron **OR** ondansetron, polyethylene glycol, midodrine, oxyCODONE-acetaminophen    Data:     Past Medical History:   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 ([facility] Anxiety, Arthritis, Back pain, chronic, Lezama esophagus, BPH (benign prostatic hypertrophy), Cholelithiasis, Cirrhosis ([facility] COVID-19, COVID-19 vaccine series completed, DDD (degenerative disc disease), lumbar, Depression, Esophageal cancer ([facility] Esophageal varices ([facility] Fatty liver, GERD (gastroesophageal reflux disease), Hep C w/o coma, chronic ([facility] History of alcohol abuse, History of blood transfusion, History of colon polyps, History of tobacco abuse, [place] (hard of hearing), Hyperlipidemia, Hypertension, Port-A-Cath in place, Sciatica, Spinal stenosis, Stomach ulcer, Tubular adenoma of colon, Vitamin D deficiency, and Wears glasses. Social History:   reports that he quit smoking about 5 years ago. He has a 45.00 pack-year smoking history. He has never used smokeless tobacco. He reports previous alcohol use. He reports previous drug use. Frequency: 1.00 time per week.      Family History:   Family History   Problem Relation Age of Onset    Cancer Mother         pancreatic    Cancer Father         bone    Diabetes Sister     Asthma Brother        Vitals:  BP (!) 133/57   Pulse 81   Temp 98 °F (36.7 °C) (Oral)   Resp 9   Wt 216 lb 4.3 oz (98.1 kg)   SpO2 94%   BMI 31.03 kg/m²   Temp (24hrs), Av.4 °F (36.9 °C), Min:97.9 °F (36.6 °C), Max:98.9 °F (37.2 °C)    No results for input(s): POCGLU in the last 72 hours. I/O(24Hr):     Intake/Output Summary (Last 24 hours) at 2022 0913  Last data filed at 2022 0344  Gross per 24 hour   Intake 2481.22 ml   Output --   Net 2481.22 ml       Labs:    CBC with Differential:    Lab Results   Component Value Date    WBC 8.3 2022    RBC 3.37 2022    RBC 4.75 2012    HGB 9.2 2022    HCT 27.9 2022    PLT 83 2022     2012    MCV 83.0 2022    MCH 27.3 2022    MCHC 32.8 2022    RDW 18.9 2022    NRBC 1 2022    LYMPHOPCT 3 2022    MONOPCT 11 2022    MYELOPCT 1 2021    BASOPCT 0 2022    MONOSABS 0.91 2022    LYMPHSABS 0.25 2022    EOSABS 0.08 2022    BASOSABS 0.00 2022    DIFFTYPE NOT REPORTED 2022     BMP:    Lab Results   Component Value Date     2022    K 3.3 2022    CL 98 2022    CO2 23 2022    BUN 12 2022    LABALBU 2.9 04/15/2022    LABALBU 4.7 2012    CREATININE 0.57 2022    CALCIUM 8.0 2022    GFRAA >60 2022    LABGLOM >60 2022    GLUCOSE 133 2022    GLUCOSE 65 2012     Sodium:    Lab Results   Component Value Date     2022     Magnesium:    Lab Results   Component Value Date    MG 2.1 2022         Radiology:    XR CHEST PORTABLE    Result Date: 4/15/2022  EXAMINATION: ONE XRAY VIEW OF THE CHEST 4/15/2022 7:59 am COMPARISON: 2022, 2021 HISTORY: ORDERING SYSTEM PROVIDED HISTORY: Dyspnea TECHNOLOGIST PROVIDED HISTORY: Dyspnea Reason for Exam: Dyspnea FINDINGS: Right chest port terminates in the superior vena cava. Mild pulmonary vascular congestion. No focal pulmonary opacities. Calcified granulomata and calcified mediastinal nodes from prior granulomatous infection. Cardiomegaly which is similar to 4 months prior. No acute bony findings. Mild pulmonary vascular congestion. Cardiomegaly. IR US GUIDED PARACENTESIS    Result Date: 4/14/2022  PROCEDURE: PARACENTESIS WITH IMAGE GUIDANCE US ABDOMEN LIMITED 4/14/2022 HISTORY: ORDERING SYSTEM PROVIDED HISTORY: ascites remove 5 liters only TECHNOLOGIST PROVIDED HISTORY: ascites remove 5 liters only TECHNIQUE: Informed consent was obtained after a detailed explanation of the procedure including risks, benefits, and alternatives. Universal protocol was followed. A limited ultrasound of the abdomen was performed and shows a moderate to large amount of ascites. The right abdomen was prepped and draped in sterile fashion and local anesthesia was achieved with lidocaine. A 5 Wallisian needle sheath was advanced into ascites using realtime ultrasound guidance and paracentesis was performed. The patient tolerated the procedure well. EBL: None FINDINGS: Limited ultrasound of the abdomen demonstrates ascites. A total of 5 L of slightly turbid yellow colored fluid was removed. Additional fluid remains on completion. Successful ultrasound-guided paracentesis. IR US GUIDED PARACENTESIS    Result Date: 4/5/2022  PROCEDURE: IR US GUIDED PARACENTESIS W IMAGING 4/5/2022 HISTORY: ORDERING SYSTEM PROVIDED HISTORY: Ascites due to alcoholic cirrhosis (HCC) TECHNIQUE: Sonography was utilized CONTRAST: None SEDATION: None FLUOROSCOPY DOSE AND TYPE OR TIME AND EXPOSURES: None DESCRIPTION OF PROCEDURE: Informed consent was obtained after a detailed explanation of the procedure including risks, benefits, and alternatives. Universal protocol was observed.   Preprocedure ultrasound shows a dominant fluid pocket in the right lowerquadrant. Using ultrasound guidance (image attached to the medical record), the fluid pocket was accessed with a 5 [place] Lorrie Yueh needle with aspiration of inch clear yellow fluid. Vacuum bottle paracentesis was performed and approximately 7.25 L was removed. the patient tolerated the procedure well and left the department in good condition. Dr. Benjamin Reyes was present. Patient received IV albumin replacement. FINDINGS: 7.25 L drained     Successful ultrasound-guided therapeutic paracentesis         Physical Examination:        Physical Exam  Constitutional:       General: He is not in acute distress. Appearance: Normal appearance. He is obese. He is not ill-appearing. Eyes:      General: No scleral icterus. Extraocular Movements: Extraocular movements intact. Conjunctiva/sclera: Conjunctivae normal.   Cardiovascular:      Rate and Rhythm: Normal rate and regular rhythm. Pulmonary:      Effort: Pulmonary effort is normal.      Breath sounds: Normal breath sounds. Abdominal:      General: Bowel sounds are normal. There is distension. Palpations: Abdomen is soft. Tenderness: There is no abdominal tenderness. Skin:     Coloration: Skin is jaundiced. Neurological:      General: No focal deficit present. Mental Status: He is alert and oriented to person, place, and time.    Psychiatric:         Mood and Affect: Mood normal.         Behavior: Behavior normal.           Assessment:        Primary Problem  Gastrointestinal hemorrhage with melena    Active Hospital Problems    Diagnosis Date Noted    Anemia [D64.9] 04/13/2022    Acute kidney injury ([address] [N17.9] 04/13/2022    Esophageal adenocarcinoma ([address] [C15.9]     Former smoker, 50+ pack years, quit 2016 [Z87.891] 12/20/2021    Spinal stenosis of lumbar region with neurogenic claudication [M48.062] 12/14/2021    Malignant neoplasm of lower third of esophagus ([address] [C15.5]     Gastrointestinal hemorrhage with melena [K92.1]     Hepatitis C virus infection resolved after antiviral drug therapy [Z86.19] 12/23/2020    Hepatic cirrhosis ([facility] [K74.60] 09/15/2016    GERD (gastroesophageal reflux disease) [K21.9] 04/19/2012       Plan:        Severe anemia in the setting of cirrhosis and portal hypertension, likely secondary to GI bleed   -Hemoglobin 3.5 on admission  -Has received 7 units PRBCs and 1 unit FFP total since admission (+1 unit in ED)  -Protonix and sandostatin drip   -FOBT positive and pt is having melena/hematochezia  -Emergent EGD 2am on 4/15, revealed no active bleed:              -Lower esophagus: Inflammation with white ulceration and bluish discoloration, hypervascularity indicative of radiation esophagitis              -Cardia of stomach: Radiation changes with hypervascularity and \"very tiny oozing\"              -Rest of stomach has some portal hypertensive changes              -5 cm proximal to GE junction, there is an inflammatory polypoid lesion measuring around 7 mm; was not biopsied due to risk of bleeding but will require outpatient follow-up to rule out cancer recurrence  -H&H every 6 hours, transfuse if hgb <7  -GI is following, colonoscopy vs nuclear bleeding scan today     Hypokalemia  -Potassium 3.3 this AM  -Replace  -Continue to monitor and replace per protocol      Hyponatremia, improving  -Sodium 120 on admission, 131 this AM  -Pt is not chronically hyponatremic  -Patient is likely intravascularly volume depleted 2/2 third spacing from ascites  -IV fluid hydration with D5 NS at 50cc/h     Sepsis, ruled out   -Tachycardia and WBCs 14.2 on admission  -Lactic acid 2.0  -SBP in 100s, midodrine 5mg TIID PRN for SBP <110,   -Urinalysis positive for nitrites and leukocyte esterase, no hgb, WBC 0-2, no growth on urine culture - UTI ruled out  -Leukocytosis resolved  -blood cultures no growth  -IV rocephin for UTI and SBP ppx, as well as rifaximin; SBP ruled out based on results of fluid analysis (see below), d/c rocephin  -IR guided paracentesis 4/14; culture no growth 10 hours, cell count not indicative of SBP, only 63 WBCs/mm3, 19% neutrophils     Acute kidney injury, resolved  -pt does not have history of CKD or hepatorenal syndrome  -Creatinine 1.42 on admission, wnl now  -FeUrea 19.5% indicative of prerenal injury  -continue IV fluids D5 NS at 50cc/h     Ascites 2/2 cirrhotic liver disease  -cirrhosis 2/2 heavy alcohol use in the past; patient also has history of hepatitis C s/p treatment  -Patient had IR guided paracentesis on 4/5/2022 and had rapid reaccumulation of ascitic fluid during this time; was scheduled for paracentesis on day of admission  -s/p IR guided paracentesis 4/14; 5L max removed per GI recommendations  -fluid has reaccumulated and abdomen is pretty distended today  -MELD score 27, Child-Laguna score 8       Portal hypertension, held home lasix, nadolol, and spironolactone d/t hypotension     History of GE junction adenocarcinoma, in remission  -pt completed chemoradiation 6/9/2021; PET scan 7/23/21 showed no recurrence  -Most recent EGD done 1/21/22: flat 3-4mm polyp at GE junction which showed no malignancy     GI PPx: Protonix drip  DVT prophylaxis: SCD cuffs, no chemical anticoagulation at this time due to severe anemia and possible active bleed  Diet: MABEL Vivar MD  4/16/2022  9:13 AM       Attending Physician Statement  I have discussed the care of Tyesha Fletcher with the resident team. I have examined the patient myself and taken ros and hpi , including pertinent history and exam findings,  with the resident. I have reviewed the key elements of all parts of the encounter with the resident. I agree with the assessment, plan and orders as documented by the resident.     Principal Problem:    Gastrointestinal hemorrhage with melena  Active Problems:    GERD (gastroesophageal reflux disease)    Hepatic cirrhosis (HCC)    Hepatitis C virus infection resolved after antiviral drug therapy    Malignant neoplasm of lower third of esophagus ([address]    Spinal stenosis of lumbar region with neurogenic claudication    Former smoker, 50+ pack years, quit 2016    Esophageal adenocarcinoma ([address]    Anemia    Acute kidney injury ([address]  Resolved Problems:    * No resolved hospital problems. *    Severe blood loss anemia presenting hemoglobin 3.5, noted bloody bowel movements, however EGD done on 4/14 as well as colonoscopy done on 4/16 did not show any obvious source of bleed, on Protonix and octreotide drips  Patient has history of lower esophageal malignancy, alcoholic liver cirrhosis with massive ascites s/p paracentesis with removal of 5 L, no SBP  Hb today 9.6, pt has receiv'd 7 U PRBC in all . Hyponatremia, hypokalemia  Thrombocytopenia platelet count 83 today, will monitor  No concern for HIT as pt has not been on AC due to anemia.    PT/OT  Discharge planning      Electronically signed by Johnny Gómez MD

## 2022-04-16 NOTE — PROGRESS NOTES
_                         Today's Date: 4/16/2022  Patient Name: Juan Pablo Brandon  Date of admission: 4/13/2022 10:10 AM  Patient's age: 57 y.o., 1960  Admission Dx: Severe anemia [D64.9]  Anemia, unspecified type [D64.9]      Requesting Physician: Mari Sears MD    CHIEF COMPLAINT: Excessive weakness and fatigue. Severe anemia. Esophageal cancer. SUBJECTIVE:  .patient was seen and examined. Clinically stable. Hemoglobin stable. .  He had GI evaluation. No CP. No dizziness. No fever. EGD showed no obvious source of active bleeding    BRIEF CASE HISTORY:    The patient is a 57 y.o.  male who is admitted to the hospital for further management of severe anemia. Patient presents with extreme weakness and fatigue. Symptoms started about 3 to 4 days ago. He has no dizziness. He has shortness of breath on minimal exertion. No abdominal pain. No chest pain. No difficulty swallowing. No nausea or vomiting. Patient denies any melena or hematochezia. No hematemesis. The patient is known to our practice. 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 Patient received chemoradiation with very good results. Patient's labs showed severe anemia with hemoglobin of 3.6. He was hospitalized with received blood transfusion. He is slightly better. Patient has no other complaints with treatment plan. No fever or signs of infection. No respiratory distress.     Past Medical History:   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 ([address] Anxiety, Arthritis, Back pain, chronic, [place] esophagus, BPH (benign prostatic hypertrophy), Cholelithiasis, Cirrhosis ([address] COVID-19, COVID-19 vaccine series completed, DDD (degenerative disc disease), lumbar, Depression, Esophageal cancer ([address] Esophageal varices ([address] Fatty liver, GERD (gastroesophageal reflux disease), Hep C w/o coma, chronic ([address] History of alcohol abuse, History of blood transfusion, History of colon polyps, History of tobacco abuse, [place] (hard of hearing), Hyperlipidemia, Hypertension, Port-A-Cath in place, Sciatica, Spinal stenosis, Stomach ulcer, Tubular adenoma of colon, Vitamin D deficiency, and Wears glasses. Past Surgical History:   has a past surgical history that includes Bunionectomy; Nasal septum surgery; other surgical history (01/04/16); Colonoscopy; Colonoscopy (10/05/2016); other surgical history (11/21/2016); other surgical history (12/19/2016); knee surgery (Left); Bunionectomy (Left); Endoscopy, colon, diagnostic; pr revise median n/carpal tunnel surg (Right, 8/29/2017); Carpal tunnel release (Right); pr revise median n/carpal tunnel surg (Left, 10/31/2017); Colonoscopy (N/A, 3/30/2018); Colonoscopy (03/30/2018); pr njx aa&/strd tfrml epi lumbar/sacral 1 level (Bilateral, 9/6/2018); other surgical history (09/28/2018); pr njx aa&/strd tfrml epi lumbar/sacral 1 level (N/A, 9/28/2018); Pain management procedure (Left, 7/9/2020); Pain management procedure (Left, 7/20/2020); Pain management procedure (Bilateral, 8/17/2020); other surgical history (Right, 11/23/2020); Nerve Block (Right, 11/23/2020); Pain management procedure (Bilateral, 12/7/2020); Upper gastrointestinal endoscopy (N/A, 12/29/2020); Upper gastrointestinal endoscopy (N/A, 2/2/2021); Upper gastrointestinal endoscopy (N/A, 2/12/2021); Upper gastrointestinal endoscopy (2/12/2021); [facility] tooth extraction; IR PORT PLACEMENT > 5 YEARS (4/19/2021); Upper gastrointestinal endoscopy (N/A, 8/31/2021); Upper gastrointestinal endoscopy (N/A, 1/21/2022); Upper gastrointestinal endoscopy (N/A, 4/15/2022); and Colonoscopy (N/A, 4/16/2022). Family History: family history includes Asthma in his brother; Cancer in his father and mother; Diabetes in his sister. Social History:   reports that he quit smoking about 5 years ago. He has a 45.00 pack-year smoking history.  He has never used smokeless tobacco. He reports previous alcohol use. He reports previous drug use. Frequency: 1.00 time per week. Medications:    Prior to Admission medications    Medication Sig Start Date End Date Taking? Authorizing Provider   cyclobenzaprine (FLEXERIL) 10 MG tablet Take 10 mg by mouth daily as needed for Muscle spasms   Yes Historical Provider, MD   oxyCODONE-acetaminophen (PERCOCET) 5-325 MG per tablet Take 1 tablet by mouth daily as needed for Pain for up to 30 days.  4/6/22 5/6/22  MASSIMO Lazaro - CNP   furosemide (LASIX) 20 MG tablet take 1 tablet by mouth once daily 4/4/22   Reyes BrothersMASSIMO - NP   nadolol (CORGARD) 20 MG tablet Take 1 tablet by mouth daily 2/8/22   Mounika Dugan MD   ferrous sulfate (IRON 325) 325 (65 Fe) MG tablet Take 1 tablet by mouth 2 times daily 1/12/22   VASU Rubi   omeprazole (PRILOSEC) 40 MG delayed release capsule take 1 capsule by mouth EVERY MORNING BEFORE BREAKFAST 11/12/21   Lay Mulligan MD   FLUoxetine (PROZAC) 20 MG capsule take 1 capsule by mouth once daily 11/12/21   VASU Rubi   lidocaine-prilocaine (EMLA) 2.5-2.5 % cream Apply topically 45-60 mins prior to needle poke daily PRN 4/22/21   Lay Mulligan MD   atorvastatin (LIPITOR) 20 MG tablet take 1 tablet by mouth at bedtime 2/23/21   MASSIMO Crowder CNP   spironolactone (ALDACTONE) 50 MG tablet take 1 tablet by mouth once daily 2/23/21   MASSIMO Crowder CNP     Current Facility-Administered Medications   Medication Dose Route Frequency Provider Last Rate Last Admin    0.9 % sodium chloride infusion   IntraVENous PRN Mounika Dugan MD        potassium chloride (KLOR-CON M) extended release tablet 40 mEq  40 mEq Oral PRN Mounika Dugan MD   40 mEq at 04/16/22 1445    Or    potassium bicarb-citric acid (EFFER-K) effervescent tablet 40 mEq  40 mEq Oral PRN Mounika Dugan MD        Or    potassium chloride 10 mEq/100 mL IVPB (Peripheral Line)  10 mEq IntraVENous PRN Ata Lopez MD        octreotide (SANDOSTATIN) 500 mcg in sodium chloride 0.9 % 100 mL infusion  25 mcg/hr IntraVENous Continuous MASSIMO Calderon NP 5 mL/hr at 04/16/22 1530 25 mcg/hr at 04/16/22 1530    dextrose 5 % and 0.9 % sodium chloride infusion   IntraVENous Continuous Ata Lopez MD 50 mL/hr at 04/15/22 1803 New Bag at 04/15/22 1803    magnesium sulfate 1000 mg in dextrose 5% 100 mL IVPB  1,000 mg IntraVENous PRN Ata Lopez MD        0.9 % sodium chloride infusion   IntraVENous PRN Ata Lopez MD        sodium chloride flush 0.9 % injection 5-40 mL  5-40 mL IntraVENous 2 times per day Ata Lopez MD   10 mL at 04/15/22 2135    sodium chloride flush 0.9 % injection 5-40 mL  5-40 mL IntraVENous PRN Ata Lopez MD        0.9 % sodium chloride infusion  25 mL IntraVENous PRN Ata Lopez MD        ondansetron (ZOFRAN-ODT) disintegrating tablet 4 mg  4 mg Oral Q8H PRN Ata Lopez MD        Or    ondansetron (ZOFRAN) injection 4 mg  4 mg IntraVENous Q6H PRN Ata Lopez MD   4 mg at 04/14/22 0117    polyethylene glycol (GLYCOLAX) packet 17 g  17 g Oral Daily PRN Ata Lopez MD        midodrine (PROAMATINE) tablet 5 mg  5 mg Oral TID PRN Ata Lopez MD        atorvastatin (LIPITOR) tablet 20 mg  20 mg Oral Nightly Augusto Cordova MD   20 mg at 04/15/22 2135    [Held by provider] FLUoxetine (PROZAC) capsule 20 mg  20 mg Oral Daily Lucian Harley MD   20 mg at 04/15/22 0753    sodium chloride flush 0.9 % injection 5-40 mL  5-40 mL IntraVENous BID Ata Lopez MD   10 mL at 04/15/22 2135    rifaximin (XIFAXAN) tablet 550 mg  550 mg Oral BID Ata Lopez MD   550 mg at 04/15/22 2134    lactulose (CHRONULAC) 10 GM/15ML solution 20 g  20 g Oral TID Ata Lopez MD   20 g at 04/15/22 0753    pantoprazole (PROTONIX) 80 mg in sodium chloride 0.9 % 100 mL infusion  8 mg/hr IntraVENous Continuous Jose Tompkins MD 10 mL/hr at 04/16/22 1152 8 mg/hr at 04/16/22 1152    iron sucrose (VENOFER) 200 mg in sodium chloride 0.9 % 100 mL IVPB  200 mg IntraVENous Q24H Jose Tompkins MD   Stopped at 04/15/22 2234    oxyCODONE-acetaminophen (PERCOCET) 5-325 MG per tablet 1 tablet  1 tablet Oral Daily PRN Jose Tompkins MD   1 tablet at 04/16/22 0605       Allergies:  Bee pollen and Pollen extract    REVIEW OF SYSTEMS:      · General: Positive for weakness and fatigue. No unanticipated weight loss or decreased appetite. No fever or chills. · Eyes: No blurred vision, eye pain or double vision. · Ears: No hearing problems or drainage. No tinnitus. · Throat: No sore throat, problems with swallowing or dysphagia. · Respiratory: No cough, sputum or hemoptysis. Positive for exertional shortness of breath. No pleuritic chest pain. · Cardiovascular: No chest pain, orthopnea or PND. No lower extremity edema. No palpitation. · Gastrointestinal: As above. · Genitourinary: No dysuria, hematuria, frequency or urgency. · Musculoskeletal: No muscle aches or pains. No limitation of movement. No back pain. No gait disturbance, No joint complaints. · Dermatologic: No skin rashes or pruritus. No skin lesions or discolorations. · Psychiatric: No depression, anxiety, or stress or signs of schizophrenia. No change in mood or affect. · Hematologic: No history of bleeding tendency. No bruises or ecchymosis. No history of clotting problems. · Infectious disease: No fever, chills or frequent infections. · Endocrine: No polydipsia or polyuria. No temperature intolerance. · Neurologic: No headaches or dizziness. No weakness or numbness of the extremities. No changes in balance, coordination,  memory, mentation, behavior. · Allergic/Immunologic: No nasal congestion or hives. No repeated infections.        PHYSICAL EXAM:      /60   Pulse 85   Temp 97.7 °F (36.5 °C) (Axillary)   Resp 19   Wt 216 lb 4.3 oz (98.1 kg)   SpO2 98%   BMI 31.03 kg/m²    Temp (24hrs), Av.2 °F (36.8 °C), Min:97.5 °F (36.4 °C), Max:98.9 °F (37.2 °C)      General appearance - not in pain or distress. Looks pale. Mental status - alert and oriented  Eyes - pupils equal and reactive, extraocular eye movements intact  Ears - bilateral TM's and external ear canals normal  Nose - normal and patent, no erythema, discharge or polyps  Mouth - mucous membranes moist, pharynx normal without lesions  Neck - supple, no significant adenopathy  Lymphatics - no palpable lymphadenopathy, no hepatosplenomegaly  Chest - clear to auscultation, no wheezes, rales or rhonchi, symmetric air entry  Heart - normal rate, regular rhythm, normal S1, S2, no murmurs, rubs, clicks or gallops  Abdomen - soft, nontender, nondistended, no masses or organomegaly  Neurological - alert, oriented, normal speech, no focal findings or movement disorder noted  Musculoskeletal - no joint tenderness, deformity or swelling  Extremities - peripheral pulses normal, no pedal edema, no clubbing or cyanosis  Skin - normal coloration and turgor, no rashes, no suspicious skin lesions noted           DATA:      Labs:       CBC:   Recent Labs     04/15/22  0526 04/15/22  1132 22  0711 22  1221   WBC 7.8  --  8.3  --    HGB 7.6*   < > 9.2* 9.6*   HCT 23.1*   < > 27.9* 30.1*   *  --  83*  --     < > = values in this interval not displayed. BMP:   Recent Labs     22  0437 22  0437 22  0711 22  1221   *   < > 131* 132*   K UNABLE TO REPORT POTASSIUM DUE TO GROSS HEMOLYSIS OF SPECIMEN. --  3.3*  --    CO2 20  --  23  --    BUN 13  --  12  --    CREATININE <0.40*  --  0.57*  --    LABGLOM Can not be calculated  --  >60  --    GLUCOSE 131*  --  133*  --     < > = values in this interval not displayed. PT/INR:   No results for input(s): PROTIME, INR in the last 72 hours.   APTT:  No results for input(s): APTT in the last 72 hours. LIVER PROFILE:  Recent Labs     04/15/22  1132   AST 34   ALT 17   LABALBU 2.9*     XR CHEST PORTABLE  Narrative: EXAMINATION:  ONE XRAY VIEW OF THE CHEST    4/15/2022 7:59 am    COMPARISON:  02/02/2022, 12/21/2021    HISTORY:  ORDERING SYSTEM PROVIDED HISTORY: Dyspnea  TECHNOLOGIST PROVIDED HISTORY:  Dyspnea  Reason for Exam: Dyspnea    FINDINGS:  Right chest port terminates in the superior vena cava. Mild pulmonary  vascular congestion. No focal pulmonary opacities. Calcified granulomata  and calcified mediastinal nodes from prior granulomatous infection. Cardiomegaly which is similar to 4 months prior. No acute bony findings. Impression: Mild pulmonary vascular congestion. Cardiomegaly. IMPRESSION:    Primary Problem  Gastrointestinal hemorrhage with melena    Active Hospital Problems    Diagnosis Date Noted    Anemia [D64.9] 04/13/2022    Acute kidney injury ([address] [N17.9] 04/13/2022    Esophageal adenocarcinoma ([address] [C15.9]     Former smoker, 50+ pack years, quit 2016 [Z87.891] 12/20/2021    Spinal stenosis of lumbar region with neurogenic claudication [M48.062] 12/14/2021    Malignant neoplasm of lower third of esophagus ([address] [C15.5]     Gastrointestinal hemorrhage with melena [K92.1]     Hepatitis C virus infection resolved after antiviral drug therapy [Z86.19] 12/23/2020    Hepatic cirrhosis ([address] [K74.60] 09/15/2016    GERD (gastroesophageal reflux disease) [K21.9] 04/19/2012       RECOMMENDATIONS:  1. Records and labs and images were reviewed and discussed with the patient. 2. Patient had esophageal cancer of the lower third of the esophagus. Status post chemoradiation with good results. 3. Current presentation with severe anemia status post transfusion and iron infusion  4. Appreciate GI input. 5.  We will continue to transfuse to maintain hemoglobin above 7   6. I will continue  IV iron infusion.           Discussed with patient and Nurse.    Fuentes Wallace MD, MD [facility]                            This note is created with the assistance of a speech recognition program.  While intending to generate a document that actually reflects the content of the visit, the document can still have some errors including those of syntax and sound a like substitutions which may escape proof reading. It such instances, actual meaning can be extrapolated by contextual diversion.

## 2022-04-16 NOTE — ANESTHESIA PRE PROCEDURE
Department of Anesthesiology  Preprocedure Note       Name:  Horace Wood   Age:  57 y.o.  :  1960                                          MRN:  [id number]         Date:  2022      Surgeon: Govind Plascencia):  Catracho Lamar MD    Procedure: Procedure(s):  COLONOSCOPY DIAGNOSTIC    Medications prior to admission:   Prior to Admission medications    Medication Sig Start Date End Date Taking? Authorizing Provider   cyclobenzaprine (FLEXERIL) 10 MG tablet Take 10 mg by mouth daily as needed for Muscle spasms   Yes Historical Provider, MD   oxyCODONE-acetaminophen (PERCOCET) 5-325 MG per tablet Take 1 tablet by mouth daily as needed for Pain for up to 30 days.  22  MASSIMO Arreola CNP   furosemide (LASIX) 20 MG tablet take 1 tablet by mouth once daily 22   MASSIMO Nance NP   nadolol (CORGARD) 20 MG tablet Take 1 tablet by mouth daily 22   Catracho Lamar MD   ferrous sulfate (IRON 325) 325 (65 Fe) MG tablet Take 1 tablet by mouth 2 times daily 22   VASU Hooks   omeprazole (PRILOSEC) 40 MG delayed release capsule take 1 capsule by mouth EVERY MORNING BEFORE BREAKFAST 21   Alvina Cabrera MD   FLUoxetine (PROZAC) 20 MG capsule take 1 capsule by mouth once daily 21   VASU Hooks   lidocaine-prilocaine (EMLA) 2.5-2.5 % cream Apply topically 45-60 mins prior to needle poke daily PRN 21   Alvina Cabrera MD   atorvastatin (LIPITOR) 20 MG tablet take 1 tablet by mouth at bedtime 21   MASSIMO Canales CNP   spironolactone (ALDACTONE) 50 MG tablet take 1 tablet by mouth once daily 21   MASSIMO Canales CNP       Current medications:    Current Facility-Administered Medications   Medication Dose Route Frequency Provider Last Rate Last Admin    0.9 % sodium chloride infusion   IntraVENous PRN Catracho Lamar MD        potassium chloride (KLOR-CON M) extended release tablet 40 mEq  40 mEq Oral PRN Sofi Nieto Marcy Shearer MD   40 mEq at 04/15/22 1754    Or    potassium bicarb-citric acid (EFFER-K) effervescent tablet 40 mEq  40 mEq Oral PRN Orlando Chapin MD        Or    potassium chloride 10 mEq/100 mL IVPB (Peripheral Line)  10 mEq IntraVENous PRN Orlando Chapin MD        octreotide (SANDOSTATIN) 500 mcg in sodium chloride 0.9 % 100 mL infusion  50 mcg/hr IntraVENous Continuous Lucian Harley MD 10 mL/hr at 04/16/22 1152 50 mcg/hr at 04/16/22 1152    dextrose 5 % and 0.9 % sodium chloride infusion   IntraVENous Continuous Lucian Harley MD 50 mL/hr at 04/15/22 1803 New Bag at 04/15/22 1803    magnesium sulfate 1000 mg in dextrose 5% 100 mL IVPB  1,000 mg IntraVENous PRN Lucian Harley MD        0.9 % sodium chloride infusion   IntraVENous PRN Lucian Harley MD        sodium chloride flush 0.9 % injection 5-40 mL  5-40 mL IntraVENous 2 times per day Keisha Mcclendon MD   10 mL at 04/15/22 2135    sodium chloride flush 0.9 % injection 5-40 mL  5-40 mL IntraVENous PRN Lucian Harley MD        0.9 % sodium chloride infusion  25 mL IntraVENous PRN Lucian Harley MD        ondansetron (ZOFRAN-ODT) disintegrating tablet 4 mg  4 mg Oral Q8H PRN Lucian Harley MD        Or    ondansetron (ZOFRAN) injection 4 mg  4 mg IntraVENous Q6H PRN Lucian Harley MD   4 mg at 04/14/22 0117    polyethylene glycol (GLYCOLAX) packet 17 g  17 g Oral Daily PRN Lucian Harley MD        midodrine (PROAMATINE) tablet 5 mg  5 mg Oral TID PRN Lucian Harley MD        atorvastatin (LIPITOR) tablet 20 mg  20 mg Oral Nightly Lucian Harley MD   20 mg at 04/15/22 2135    [Held by provider] FLUoxetine (PROZAC) capsule 20 mg  20 mg Oral Daily Lucian Harley MD   20 mg at 04/15/22 0753    sodium chloride flush 0.9 % injection 5-40 mL  5-40 mL IntraVENous BID Lucian Harley MD   10 mL at 04/15/22 2135    rifaximin (XIFAXAN) tablet 550 mg  550 mg Oral BID Lucian Harley MD   550 mg at 04/15/22 2134    lactulose (CHRONULAC) 10 GM/15ML solution 20 g  20 g Oral TID Keisha Mcclendon MD 20 g at 04/15/22 0753    pantoprazole (PROTONIX) 80 mg in sodium chloride 0.9 % 100 mL infusion  8 mg/hr IntraVENous Continuous Lucian Harley MD 10 mL/hr at 04/16/22 1152 8 mg/hr at 04/16/22 1152    iron sucrose (VENOFER) 200 mg in sodium chloride 0.9 % 100 mL IVPB  200 mg IntraVENous Q24H Lucian Harley MD   Stopped at 04/15/22 2234    oxyCODONE-acetaminophen (PERCOCET) 5-325 MG per tablet 1 tablet  1 tablet Oral Daily PRN Paige Peña MD   1 tablet at 04/16/22 0605       Allergies: Allergies   Allergen Reactions    Bee Pollen Other (See Comments)     Runny nose, watery eyes    Pollen Extract      Runny nose, watery eyes       Problem List:    Patient Active Problem List   Diagnosis Code    Back pain, chronic M54.9, G89.29    Hearing difficulty H91.90    GERD (gastroesophageal reflux disease) K21.9    Cervical radicular pain M54.12    Alcohol withdrawal syndrome without complication (HCC) B23.765    Hyponatremia E87.1    Hypomagnesemia E83.42    Chronic viral hepatitis B without delta agent and without coma (HCC) B18.1    Calculus of gallbladder without cholecystitis K80.20    Hep C w/o coma, chronic (HCC) B18.2    Fatty liver K76.0    Psychophysiologic insomnia F51.04    Cirrhosis (HCC) K74.60    Hepatic cirrhosis (HCC) K74.60    Vertebrogenic low back pain M54.51    DDD (degenerative disc disease), lumbar M51.36    Depression F32. A    Tubular adenoma of colon D12.6    History of colon polyps Z86.010    Gynecomastia, male N58    Lumbar radiculitis M54.16    Lumbar disc herniation M51.26    Tinnitus H93.19    Eustachian tube dysfunction H69.80    Ganglion cyst M67.40    Carpal tunnel syndrome of right wrist G56.01    History of hepatitis C Z86.19    Vitamin D deficiency E55.9    Pure hypercholesterolemia E78.00    Hypokalemia E87.6    Essential hypertension I10    Recurrent major depressive disorder in partial remission (HCC) F33.41    S/P epidural steroid injection Z92.241    Elevated LFTs R79.89    Seasonal allergies J30.2    Lumbar facet arthropathy M47.816    Cervical facet syndrome M47.812    Acute gastrointestinal bleeding K92.2    Thrombocytopenia (HCC) D69.6    Hepatitis C virus infection resolved after antiviral drug therapy Z86.19    Gastrointestinal hemorrhage with melena K92.1    Alcohol abuse F10.10    Altered mental status R41.82    Hypocalcemia E83.51    Hypophosphatemia E83.39    Malignant neoplasm of lower third of esophagus (HCC) C15.5    COVID-19 U07.1    Anxiety F41.9    Current smoker F17.200    Severe comorbid illness R69    Gait instability R26.81    Abnormal findings on diagnostic imaging of spine R93.7    Cervical spinal stenosis M48.02    Spinal stenosis of lumbar region with neurogenic claudication M48.062    Low hemoglobin D64.9    Symptomatic anemia, microcytic, acute D64.9    Hypotension I95.9    Former smoker, 50+ pack years, quit 2016 Z87.891    HLD (hyperlipidemia) E78.5    Esophageal adenocarcinoma (HCC) C15.9    Anemia D64.9    Acute kidney injury ([address] N17.9       Past Medical History:        Diagnosis Date    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)     Anxiety     Arthritis     Back pain, chronic     dr. Ele Lopez, orthopedic, every 3-4 months, gets steroid injection    Lezama esophagus     BPH (benign prostatic hypertrophy)     Cholelithiasis     Cirrhosis ([address]     COVID-19 12/2020    pt reports he had a positive test while at [facility] in 2020, was asymptomatic    COVID-19 vaccine series completed 5/20/2021, 6/22/2021    Moderna 5/20/2021, 6/22/2021    DDD (degenerative disc disease), lumbar     Depression     Esophageal cancer ([address]     INVASIVE ADENOCARCINOMA ARISING IN TUBULAR ADENOMA WITH HIGH GRADE DYSPLASIA, ASSOCIATED WITH FOCAL INTESTINAL METAPLASIA     Esophageal varices ([address]     Fatty liver     GERD (gastroesophageal reflux disease)     Hep C w/o coma, chronic ([address]     History of alcohol abuse 6-12 beers a day; quit drinking July 2016    History of blood transfusion     History of colon polyps 2016    History of tobacco abuse     Resighini (hard of hearing)     Hyperlipidemia     Hypertension     Port-A-Cath in place     right upper chest    Sciatica     Spinal stenosis     Stomach ulcer     hx of    Tubular adenoma of colon 2016, 2018    Vitamin D deficiency     Wears glasses        Past Surgical History:        Procedure Laterality Date    BUNIONECTOMY      twice on right side    BUNIONECTOMY Left     CARPAL TUNNEL RELEASE Right     COLONOSCOPY      at age 35    COLONOSCOPY  10/05/2016    polyps-pathology tubular adenoma, and abnormal looking mucosa right colon-pathology-tubular adenoma    COLONOSCOPY N/A 3/30/2018    COLONOSCOPY POLYPECTOMY COLD BIOPSY performed by Laurita Kendall MD at [address] [place] [address]  03/30/2018    Small polyp in the sigmoid colon and excised with biopsy forceps--tubular adenoma    ENDOSCOPY, COLON, DIAGNOSTIC      EGD    IR PORT PLACEMENT EQUAL OR GREATER THAN 5 YEARS  4/19/2021    IR PORT PLACEMENT EQUAL OR GREATER THAN 5 YEARS 4/19/2021 [facility]ED[facility]    KNEE SURGERY Left     cyst removed    NASAL SEPTUM SURGERY      NERVE BLOCK Right 11/23/2020    NERVE BLOCK RIGHT CERVICAL STEROID INJECTION  C3-C6 performed by Jo Jorge MD at [address] [place] [address]  01/04/16    steroid injection C7 T1    OTHER SURGICAL HISTORY  11/21/2016    Bilateral Lumbar CACHORRO L4-L5 injections    OTHER SURGICAL HISTORY  12/19/2016    lumbar steroid injection    OTHER SURGICAL HISTORY  09/28/2018    BILATERAL L5 CACHORRO (N/A Back)    OTHER SURGICAL HISTORY Right 11/23/2020    cervical injection    PAIN MANAGEMENT PROCEDURE Left 7/9/2020    EPIDURAL STEROID INJECTION LEFT L4 L5 performed by Jo Jorge MD at [address] 7/20/2020    LEFT L4 L5 EPIDURAL STEROID INJECTION performed by Jo Jorge MD at [place] [address] MANAGEMENT PROCEDURE Bilateral 2020    LUMBAR FACET BILATERAL L2-L5 performed by Berenice Vinson MD at [address] [place] [address] Bilateral 2020    NERVE BLOCK BILATERAL LUMBAR MEDIAL BRANCH L2-L5 performed by Berenice Vinson MD at [address] AA&/STRD TFRML EPI LUMBAR/SACRAL 1 LEVEL Bilateral 2018    BILATERAL L5 CACHORRO performed by Berenice Vinson MD at [address] AA&/STRD TFRML EPI LUMBAR/SACRAL 1 LEVEL N/A 2018    BILATERAL L5 CACHORRO performed by Berenice Vinson MD at [address] [place] [address] N/CARPAL TUNNEL SURG Right 2017    CARPAL TUNNEL RELEASE RIGHT performed by Semaj Ramires MD at [address] [place] [address] N/CARPAL TUNNEL SURG Left 10/31/2017    CARPAL TUNNEL RELEASE performed by Semaj Ramires MD at [facility] 2020    EGD BIOPSY performed by Mayank Zamorano MD at [facility] 2021    EGD BIOPSY and spot marking performed by Marylou Lovelace MD at [facility] 2021    ENDOSCOPIC ULTRASOUND, EGD performed by Howard Holloway MD at [address] [place] [address]  2021    EGD DIAGNOSTIC ONLY performed by Howard Holloway MD at [address] [place] [address] N/A 2021    EGD BIOPSY performed by Marylou Lovelace MD at [facility] N/A 2022    EGD BIOPSY performed by Marylou Lovelace MD at [facility] 4/15/2022    EGD ESOPHAGOGASTRODUODENOSCOPY performed by Mayank Zamorano MD at [address] [place] [address] History:    Social History     Tobacco Use    Smoking status: Former Smoker     Packs/day: 1.00     Years: 45.00     Pack years: 45.00     Quit date: 2017     Years since quittin.2    Smokeless tobacco: Never Used   Substance Use Topics    Alcohol use: Not Currently Comment: quit 2019                                Counseling given: Not Answered      Vital Signs (Current):   Vitals:    04/16/22 0800 04/16/22 0830 04/16/22 0900 04/16/22 0930   BP: (!) 130/101 (!) 142/92 (!) 158/96 138/72   Pulse: 79 88 81 72   Resp: 16 16 10 15   Temp: 98.2 °F (36.8 °C)      TempSrc: Axillary      SpO2: 92%  94% 94%   Weight:                                                  BP Readings from Last 3 Encounters:   04/16/22 138/72   04/15/22 (!) 100/55   04/13/22 (!) 120/56       NPO Status:                                                                                 BMI:   Wt Readings from Last 3 Encounters:   04/15/22 216 lb 4.3 oz (98.1 kg)   04/13/22 210 lb (95.3 kg)   04/06/22 198 lb (89.8 kg)     Body mass index is 31.03 kg/m². CBC:   Lab Results   Component Value Date    WBC 8.3 04/16/2022    RBC 3.37 04/16/2022    RBC 4.75 04/19/2012    HGB 9.2 04/16/2022    HCT 27.9 04/16/2022    MCV 83.0 04/16/2022    RDW 18.9 04/16/2022    PLT 83 04/16/2022     04/19/2012       CMP:   Lab Results   Component Value Date     04/16/2022    K 3.3 04/16/2022    CL 98 04/16/2022    CO2 23 04/16/2022    BUN 12 04/16/2022    CREATININE 0.57 04/16/2022    GFRAA >60 04/16/2022    LABGLOM >60 04/16/2022    GLUCOSE 133 04/16/2022    GLUCOSE 65 04/19/2012    PROT 4.5 04/15/2022    CALCIUM 8.0 04/16/2022    BILITOT 6.08 04/15/2022    ALKPHOS 90 04/15/2022    AST 34 04/15/2022    ALT 17 04/15/2022       POC Tests: No results for input(s): POCGLU, POCNA, POCK, POCCL, POCBUN, POCHEMO, POCHCT in the last 72 hours.     Coags:   Lab Results   Component Value Date    PROTIME 18.3 04/13/2022    INR 1.5 04/13/2022    APTT 34.8 04/13/2022       HCG (If Applicable): No results found for: PREGTESTUR, PREGSERUM, HCG, HCGQUANT     ABGs: No results found for: PHART, PO2ART, FNN0SGR, ODP6VFY, BEART, E1TIOJCH     Type & Screen (If Applicable):  No results found for: LABABO, LABRH    Drug/Infectious Status (If Applicable):  Lab Results   Component Value Date    HEPCAB REACTIVE 12/23/2020       COVID-19 Screening (If Applicable):   Lab Results   Component Value Date    COVID19 Not Detected 04/14/2022    COVID19 Not Detected 02/02/2022    COVID19 Not Detected 07/17/2020           Anesthesia Evaluation  Patient summary reviewed and Nursing notes reviewed  Airway: Mallampati: II     Neck ROM: full  Mouth opening: > = 3 FB Dental: normal exam         Pulmonary: breath sounds clear to auscultation      (-) rhonchi, wheezes, rales, stridor and no decreased breath sounds                           Cardiovascular:    (+) hypertension:,     (-) murmur, weak pulses,  friction rub, systolic click,  JVD and peripheral edema      Rhythm: regular  Rate: normal                 ROS comment: Left ventricle is normal in size. Mild left ventricular hypertrophy. Global left ventricular systolic function is normal. Estimated LV EF 60-65%. Left atrium is mildly dilated. No significant valvular regurgitation or stenosis seen. No significant pericardial effusion is seen. Normal aortic root dimension. Neuro/Psych:   (+) neuromuscular disease:, psychiatric history:            GI/Hepatic/Renal:   (+) GERD:, PUD, hepatitis: B, liver disease:,           Endo/Other:    (+) blood dyscrasia: anemia and thrombocytopenia:., malignancy/cancer. ROS comment: Hx of esophageal CA Abdominal:   (+) obese,           Vascular: Other Findings:           Anesthesia Plan      general     ASA 3       Induction: intravenous. Anesthetic plan and risks discussed with patient.                   Heather Walton MD   4/16/2022

## 2022-04-16 NOTE — OP NOTE
COLONOSCOPY    DATE OF PROCEDURE: 4/16/2022    SURGEON: Catracho Lamar MD    ASSISTANT: None    PREOPERATIVE DIAGNOSIS: Patient had GI bleeding. Patient was evaluated by Dr. Veronica Cano MD and felt that patient needs colonoscopy and arranged. POSTOPERATIVE DIAGNOSIS: Small hemorrhoids. Questionable inflamed mucosa or lesion in the sigmoid colon, flat polyp right colon    OPERATION: Total colonoscopy, biopsies    ANESTHESIA: MAC    ESTIMATED BLOOD LOSS: None    COMPLICATIONS: None     SPECIMENS:  Was Obtained: Biopsy of lesion sigmoid colon and a polyp biopsy right colon/rule out malignancy, dysplasia    HISTORY: The patient is a 57y.o. year old male with history of above preop diagnosis. I recommended colonoscopy with possible biopsy or polypectomy and I explained the risk, benefits, expected outcome, and alternatives to the procedure. Risks included but are not limited to bleeding, infection, respiratory distress, hypotension, and perforation of the colon and possibility of missing a lesion. The patient understands and is in agreement. PROCEDURE:  The patient's SPO2 remained above 90% throughout the procedure. Digital rectal exam was normal.  The colonoscope was inserted through the anus into the rectum and advanced under direct vision to the cecum without difficulty. Terminal ileum was examined for approximately 2 inches. The prep was good. Findings:  Terminal ileum: normal, examined for a couple of inches no bleeding noted in this area    Cecum/Ascending colon: abnormal: Had edema of the mucosa. There was spot marking noted in the mid right colon. Just above this area there is a polyp which is about 7 to 10 mm flat. This is biopsied. Transverse colon: normal    Descending/Sigmoid colon: abnormal: Has abnormal looking mucosa. Multiple biopsies taken to rule out malignancy and dysplasia.     Rectum/Anus: examined in normal and retroflexed positions and was normal, as small hemorrhoids. During this examination no clots, old blood, obvious diverticulosis seen. No angiodysplasia seen. Withdrawal Time was (minutes): 12          The colon was decompressed and the scope was removed. The patient tolerated the procedure without unusual events. During the procedure, the patient's blood pressure, pulse and oxygen saturation remained stable and documented. No unusual events occurred during the procedure. Patient was transferred to recovery room and will be discharged when criteria is met. Recommendations/Plan:   1. F/U Biopsies  2. F/U In Office as instructed  3. Discussed with the family  4. High fiber diet   5.  Precautions to avoid constipation         Electronically signed by Orlando Freeman MD  on 4/16/2022 at 1:55 PM

## 2022-04-16 NOTE — PROGRESS NOTES
Patient taken to surgery for colonoscopy. Patient attached to portable cardiac monitor. RN x2 with patient.

## 2022-04-16 NOTE — PROGRESS NOTES
ICU Progress Note (Non-Vent)  [facility]    Patient - Ellie Turcios,  Age - 57 y.o.    - 1960      Room Number -    N -  [id number]   Acct # - [de-identified]  Date of Admission -  2022 10:10 AM    Events of Past 24 Hours   Had several bloody bowel movements overnight, hemoglobin this a.m. is 6.4    Vitals    weight is 216 lb 4.3 oz (98.1 kg). His oral temperature is 98 °F (36.7 °C). His blood pressure is 133/57 (abnormal) and his pulse is 81. His respiration is 9 and oxygen saturation is 94%.        Temperature Range: Temp: 98 °F (36.7 °C) Temp  Av.3 °F (36.8 °C)  Min: 97.8 °F (36.6 °C)  Max: 98.9 °F (37.2 °C)  BP Range:  Systolic (88NXZ), AYR:397 , Min:86 , CXJ:432     Diastolic (08OSJ), DMM:25, Min:35, Max:86    Pulse Range: Pulse  Av.3  Min: 74  Max: 116  Respiration Range: Resp  Av.6  Min: 9  Max: 29  Current Pulse Ox[de-identified]  SpO2: 94 %  24HR Pulse Ox Range:  SpO2  Av.4 %  Min: 72 %  Max: 100 %  Oxygen Amount and Delivery: O2 Flow Rate (L/min): 0 L/min    Wt Readings from Last 3 Encounters:   04/15/22 216 lb 4.3 oz (98.1 kg)   22 210 lb (95.3 kg)   22 198 lb (89.8 kg)     I/O       Intake/Output Summary (Last 24 hours) at 2022 0631  Last data filed at 2022 0344  Gross per 24 hour   Intake 2481.22 ml   Output --   Net 2481.22 ml     DRAIN/TUBE OUTPUT       Invasive Lines   ICP PRESSURE RANGE  No data recorded  CVP PRESSURE RANGE  No data recorded      Medications      sodium chloride flush  5-40 mL IntraVENous 2 times per day    atorvastatin  20 mg Oral Nightly    [Held by provider] FLUoxetine  20 mg Oral Daily    sodium chloride flush  5-40 mL IntraVENous BID    rifaximin  550 mg Oral BID    lactulose  20 g Oral TID    iron sucrose  200 mg IntraVENous Q24H     sodium chloride, sodium chloride, potassium chloride **OR** potassium alternative oral replacement **OR** potassium chloride, sodium chloride, sodium chloride, sodium chloride, sodium chloride, magnesium sulfate, sodium chloride, sodium chloride flush, sodium chloride, ondansetron **OR** ondansetron, polyethylene glycol, midodrine, sodium chloride, oxyCODONE-acetaminophen  IV Drips/Infusions   sodium chloride      sodium chloride      sodium chloride      octreotide (SandoSTATIN) infusion 50 mcg/hr (04/15/22 9468)    sodium chloride      dextrose 5 % and 0.9 % NaCl 50 mL/hr at 04/15/22 1803    sodium chloride      sodium chloride      sodium chloride      sodium chloride      pantoprazole 8 mg/hr (04/16/22 0016)    sodium chloride         Diet/Nutrition   Diet NPO Exceptions are: Sips of Water with Meds    Exam      Constitutional -sleeping  General Appearance  well developed, well nourished obese  HEENT -normocephalic, atraumatic. PERRLA  Lungs - Chest expands equally, no wheezes, rales or rhonchi. Cardiovascular - Heart sounds are normal.  normal rate and rhythm regular, no murmur, gallop or rub. Abdomen -firm, distended, no masses or organomegaly  Neurologic - CN II-XII are grossly intact.  There are no focal motor deficits  Skin - no bruising or bleeding  Extremities - no cyanosis, clubbing or edema    Lab Results   CBC     Lab Results   Component Value Date    WBC 7.8 04/15/2022    RBC 2.89 04/15/2022    RBC 4.75 04/19/2012    HGB 6.4 04/15/2022    HCT 21.6 04/15/2022     04/15/2022     04/19/2012    MCV 79.9 04/15/2022    MCH 26.2 04/15/2022    MCHC 32.8 04/15/2022    RDW 18.6 04/15/2022    NRBC 1 04/13/2022    LYMPHOPCT 2 04/15/2022    MONOPCT 6 04/15/2022    MYELOPCT 1 06/01/2021    BASOPCT 0 04/15/2022    MONOSABS 0.47 04/15/2022    LYMPHSABS 0.16 04/15/2022    EOSABS 0.00 04/15/2022    BASOSABS 0.00 04/15/2022    DIFFTYPE NOT REPORTED 02/02/2022       BMP   Lab Results   Component Value Date     04/16/2022    K  04/16/2022     UNABLE TO REPORT POTASSIUM DUE TO GROSS HEMOLYSIS OF SPECIMEN. CL 96 04/16/2022    CO2 20 04/16/2022    BUN 13 04/16/2022    CREATININE <0.40 04/16/2022    GLUCOSE 131 04/16/2022    GLUCOSE 65 04/19/2012       LFTS  Lab Results   Component Value Date    ALKPHOS 90 04/15/2022    ALT 17 04/15/2022    AST 34 04/15/2022    PROT 4.5 04/15/2022    BILITOT 6.08 04/15/2022    BILIDIR 2.46 04/15/2022    IBILI 3.62 04/15/2022    LABALBU 2.9 04/15/2022    LABALBU 4.7 04/19/2012       ABG ABGs: No results found for: PHART, PO2ART, RHJ8PXJ    No results found for: IFIO2, MODE, SETTIDVOL, SETPEEP      INR  Recent Labs     04/13/22  1020   PROTIME 18.3*   INR 1.5       APTT  Recent Labs     04/13/22  1020   APTT 34.8       Lactic Acid  Lab Results   Component Value Date    LACTA 2.0 04/13/2022    LACTA 1.0 07/21/2016    LACTA 2.8 07/20/2016        BNP   No results for input(s): BNP in the last 72 hours.      Cultures       Radiology     CXR      CT Scans    (See actual reports for details)      SYSTEMS ASSESSMENT    Anemia due to blood loss  Alcoholic liver cirrhosis with portal hypertension  Hyponatremia and hypokalemia  History of tobacco use  Nonmorbid obesity  Possible ONOFRE      Okay to proceed with GI procedure, moderate risk-he has now received 7 units of packed red blood cells  Continue to monitor H&H  Transfuse if hemoglobin less than 7  Remains on octreotide and Protonix drips  I do not see the benefit of giving him any albumin and Lasix   Continue ICU monitoring      Critical Care Time   0 min    Electronically signed by Jyothi Early MD on 4/16/2022 at 6:31 AM

## 2022-04-16 NOTE — PLAN OF CARE
Problem: Falls - Risk of:  Goal: Will remain free from falls  Description: Will remain free from falls  Outcome: Ongoing  Note: Patient remains free from falls this shift, appropriate safety measures in place      Problem:  Bowel/Gastric:  Goal: Ability to achieve a regular elimination pattern will improve  Description: Ability to achieve a regular elimination pattern will improve  Outcome: Ongoing  Note: Patient had a colonoscopy today      Problem: Skin Integrity:  Goal: Skin integrity will improve  Description: Skin integrity will improve  Outcome: Ongoing  Note: No new skin breakdown noted this shift, patient turns self in bed      Problem: Pain:  Goal: Pain level will decrease  Description: Pain level will decrease  Outcome: Ongoing  Note: Patient denies pain this shift

## 2022-04-16 NOTE — FLOWSHEET NOTE
04/15/22 3379   Treatment Team Notification   Reason for Communication Evaluate   Team Member Name Dr. Ruthann Mota Team Role Consulting Provider   Method of Communication Call   Response See orders   Dr. Chance Brenner called up to unit requesting patient update. Updated on recent vital signs and that patient has had two episodes of bloody stool. Updated that recent hemoglobin check has not yet resulted. Orders received to transfuse 1 unit of PRBC's if hemoglobin is less than 7.

## 2022-04-16 NOTE — PROGRESS NOTES
Spoke with GI regarding continuous Protonix and Sandostatin drips. Per NP decrease Sandostatin to 25mcg/hr for 8 hours, then discontinue. Continue Protonix gtt.

## 2022-04-16 NOTE — FLOWSHEET NOTE
04/16/22 1100   Encounter Summary   Services provided to: Patient   Referral/Consult From: Ralph   Complexity of Encounter Low   Length of Encounter 15 minutes   Spiritual/Mandaen   Type Spiritual support   Assessment Sleeping   Intervention Prayer

## 2022-04-16 NOTE — ANESTHESIA POSTPROCEDURE EVALUATION
POST- ANESTHESIA EVALUATION       Pt Name: Nydia Simon  MRN: [id number]  YOB: 1960  Date of evaluation: 4/16/2022  Time:  2:27 PM      /88   Pulse 71   Temp 97.8 °F (36.6 °C)   Resp 17   Wt 216 lb 4.3 oz (98.1 kg)   SpO2 98%   BMI 31.03 kg/m²      Consciousness Level  Awake  Cardiopulmonary Status  Stable  Pain Adequately Treated YES  Nausea / Vomiting  NO  Adequate Hydration  YES  Anesthesia Related Complications NONE      Electronically signed by Cecilia Javed MD on 4/16/2022 at 2:27 PM       Department of Anesthesiology  Postprocedure Note    Patient: Nydia Simon  MRN: [id number]  YOB: 1960  Date of evaluation: 4/16/2022  Time:  2:27 PM     Procedure Summary     Date: 04/16/22 Room / Location: [address] [place] [address]: [facility]    Anesthesia Start: 1329 Anesthesia Stop: 1400    Procedure: COLONOSCOPY POLYPECTOMY (N/A ) Diagnosis: (GI Bleed)    Surgeons: Jose Juan Chris MD Responsible Provider: Cecilia Javed MD    Anesthesia Type: general ASA Status: 3          Anesthesia Type: general    Yen Phase I: Yen Score: 10    Yen Phase II:      Last vitals: Reviewed and per EMR flowsheets.        Anesthesia Post Evaluation

## 2022-04-17 LAB
ANION GAP SERPL CALCULATED.3IONS-SCNC: 6 MMOL/L (ref 9–17)
BUN BLDV-MCNC: 7 MG/DL (ref 8–23)
CALCIUM IONIZED: 1.14 MMOL/L (ref 1.13–1.33)
CALCIUM SERPL-MCNC: 7.5 MG/DL (ref 8.6–10.4)
CHLORIDE BLD-SCNC: 100 MMOL/L (ref 98–107)
CO2: 23 MMOL/L (ref 20–31)
CREAT SERPL-MCNC: 0.45 MG/DL (ref 0.7–1.2)
GFR AFRICAN AMERICAN: >60 ML/MIN
GFR NON-AFRICAN AMERICAN: >60 ML/MIN
GFR SERPL CREATININE-BSD FRML MDRD: ABNORMAL ML/MIN/{1.73_M2}
GLUCOSE BLD-MCNC: 128 MG/DL (ref 70–99)
HCT VFR BLD CALC: 28 % (ref 41–53)
HEMOGLOBIN: 8.9 G/DL (ref 13.5–17.5)
MCH RBC QN AUTO: 27.2 PG (ref 26–34)
MCHC RBC AUTO-ENTMCNC: 31.7 G/DL (ref 31–37)
MCV RBC AUTO: 85.8 FL (ref 80–100)
PDW BLD-RTO: 19.2 % (ref 11.5–14.9)
PLATELET # BLD: 77 K/UL (ref 150–450)
PMV BLD AUTO: 6.9 FL (ref 6–12)
POTASSIUM SERPL-SCNC: 4.3 MMOL/L (ref 3.7–5.3)
RBC # BLD: 3.26 M/UL (ref 4.5–5.9)
SODIUM BLD-SCNC: 128 MMOL/L (ref 135–144)
SODIUM BLD-SCNC: 129 MMOL/L (ref 135–144)
SODIUM BLD-SCNC: 130 MMOL/L (ref 135–144)
WBC # BLD: 7.4 K/UL (ref 3.5–11)

## 2022-04-17 PROCEDURE — 2580000003 HC RX 258: Performed by: INTERNAL MEDICINE

## 2022-04-17 PROCEDURE — 6370000000 HC RX 637 (ALT 250 FOR IP): Performed by: INTERNAL MEDICINE

## 2022-04-17 PROCEDURE — 80048 BASIC METABOLIC PNL TOTAL CA: CPT

## 2022-04-17 PROCEDURE — 36415 COLL VENOUS BLD VENIPUNCTURE: CPT

## 2022-04-17 PROCEDURE — 6360000002 HC RX W HCPCS: Performed by: INTERNAL MEDICINE

## 2022-04-17 PROCEDURE — 99232 SBSQ HOSP IP/OBS MODERATE 35: CPT | Performed by: INTERNAL MEDICINE

## 2022-04-17 PROCEDURE — 82330 ASSAY OF CALCIUM: CPT

## 2022-04-17 PROCEDURE — 99233 SBSQ HOSP IP/OBS HIGH 50: CPT | Performed by: INTERNAL MEDICINE

## 2022-04-17 PROCEDURE — 85027 COMPLETE CBC AUTOMATED: CPT

## 2022-04-17 PROCEDURE — 2000000000 HC ICU R&B

## 2022-04-17 PROCEDURE — 84295 ASSAY OF SERUM SODIUM: CPT

## 2022-04-17 PROCEDURE — C9113 INJ PANTOPRAZOLE SODIUM, VIA: HCPCS | Performed by: INTERNAL MEDICINE

## 2022-04-17 RX ADMIN — LACTULOSE 20 G: 20 SOLUTION ORAL at 16:05

## 2022-04-17 RX ADMIN — OXYCODONE HYDROCHLORIDE 5 MG: 5 TABLET ORAL at 08:23

## 2022-04-17 RX ADMIN — DEXTROSE AND SODIUM CHLORIDE: 5; 900 INJECTION, SOLUTION INTRAVENOUS at 01:14

## 2022-04-17 RX ADMIN — SODIUM CHLORIDE 8 MG/HR: 9 INJECTION, SOLUTION INTRAVENOUS at 05:17

## 2022-04-17 RX ADMIN — SODIUM CHLORIDE, PRESERVATIVE FREE 10 ML: 5 INJECTION INTRAVENOUS at 21:58

## 2022-04-17 RX ADMIN — LACTULOSE 20 G: 20 SOLUTION ORAL at 08:23

## 2022-04-17 RX ADMIN — DEXTROSE AND SODIUM CHLORIDE: 5; 900 INJECTION, SOLUTION INTRAVENOUS at 22:04

## 2022-04-17 RX ADMIN — RIFAXIMIN 550 MG: 550 TABLET ORAL at 21:57

## 2022-04-17 RX ADMIN — SODIUM CHLORIDE 8 MG/HR: 9 INJECTION, SOLUTION INTRAVENOUS at 17:32

## 2022-04-17 RX ADMIN — LACTULOSE 20 G: 20 SOLUTION ORAL at 21:58

## 2022-04-17 RX ADMIN — IRON SUCROSE 200 MG: 20 INJECTION, SOLUTION INTRAVENOUS at 19:17

## 2022-04-17 RX ADMIN — RIFAXIMIN 550 MG: 550 TABLET ORAL at 08:23

## 2022-04-17 RX ADMIN — ATORVASTATIN CALCIUM 20 MG: 20 TABLET, FILM COATED ORAL at 21:58

## 2022-04-17 RX ADMIN — SODIUM CHLORIDE, PRESERVATIVE FREE 10 ML: 5 INJECTION INTRAVENOUS at 08:24

## 2022-04-17 RX ADMIN — SODIUM CHLORIDE 8 MG/HR: 9 INJECTION, SOLUTION INTRAVENOUS at 22:05

## 2022-04-17 ASSESSMENT — PAIN SCALES - GENERAL
PAINLEVEL_OUTOF10: 0
PAINLEVEL_OUTOF10: 10
PAINLEVEL_OUTOF10: 0
PAINLEVEL_OUTOF10: 10
PAINLEVEL_OUTOF10: 0
PAINLEVEL_OUTOF10: 0

## 2022-04-17 ASSESSMENT — PAIN DESCRIPTION - DESCRIPTORS
DESCRIPTORS: ACHING
DESCRIPTORS: ACHING

## 2022-04-17 ASSESSMENT — ENCOUNTER SYMPTOMS
NAUSEA: 0
CONSTIPATION: 0
VOMITING: 0
BACK PAIN: 1
BLOOD IN STOOL: 0
ABDOMINAL PAIN: 0
SHORTNESS OF BREATH: 1
DIARRHEA: 0
ABDOMINAL DISTENTION: 1

## 2022-04-17 ASSESSMENT — PAIN DESCRIPTION - LOCATION
LOCATION: HEAD
LOCATION: HEAD

## 2022-04-17 ASSESSMENT — PAIN DESCRIPTION - ORIENTATION: ORIENTATION: OTHER (COMMENT)

## 2022-04-17 ASSESSMENT — PAIN DESCRIPTION - FREQUENCY
FREQUENCY: CONTINUOUS
FREQUENCY: CONTINUOUS

## 2022-04-17 ASSESSMENT — PAIN DESCRIPTION - PAIN TYPE: TYPE: ACUTE PAIN

## 2022-04-17 NOTE — PLAN OF CARE
Problem: Falls - Risk of:  Goal: Will remain free from falls  4/17/2022 1707 by Jacey Haq RN  Outcome: Met This Shift  4/17/2022 0348 by Ezekiel Shetty RN  Outcome: Ongoing  Note: Bed remains in lowest position, call light within reach. Patient remains free of falls at this time. RN will continue to monitor. Goal: Absence of physical injury  4/17/2022 1707 by Jacey Haq RN  Outcome: Met This Shift  4/17/2022 0348 by Ezekiel Shetty RN  Outcome: Ongoing  Note: Fall assessment performed and appropriate measures implemented. Room freed from clutter. Bed in lowest position with wheels locked. Call light in place. ID band in place. Problem: Activity:  Goal: Fatigue will decrease  4/17/2022 1707 by Jacey Haq RN  Outcome: Met This Shift  4/17/2022 0348 by Ezekiel Shetty RN  Outcome: Ongoing  Goal: Ability to tolerate increased activity will improve  4/17/2022 1707 by Jacey Haq RN  Outcome: Met This Shift  4/17/2022 0348 by Ezekiel Shetty RN  Outcome: Ongoing  Goal: Ability to maintain optimal joint mobility will improve  4/17/2022 1707 by Jacey Haq RN  Outcome: Met This Shift  4/17/2022 0348 by Ezekiel Shetty RN  Outcome: Ongoing     Problem:  Bowel/Gastric:  Goal: Ability to achieve a regular elimination pattern will improve  4/17/2022 1707 by Jacey Haq RN  Outcome: Met This Shift  4/17/2022 0348 by Ezekiel Shetty RN  Outcome: Ongoing     Problem: Cardiac:  Goal: Ability to maintain an adequate cardiac output will improve  4/17/2022 1707 by Jacey Haq RN  Outcome: Met This Shift  4/17/2022 0348 by Ezekiel Shetty RN  Outcome: Ongoing  Goal: Ability to maintain adequate ventilation will improve  4/17/2022 1707 by Jacey Haq RN  Outcome: Met This Shift  4/17/2022 0348 by Ezekiel Shetty RN  Outcome: Ongoing  Goal: Ability to achieve and maintain adequate cardiopulmonary perfusion will improve  4/17/2022 1707 by Jacey Haq RN  Outcome: Met This Shift  4/17/2022 0348 by Ezekiel Shetty RN  Outcome: Ongoing     Problem: Cardiac:  Goal: Ability to maintain an adequate cardiac output will improve  4/17/2022 1707 by Akanksha Christian RN  Outcome: Met This Shift  4/17/2022 0348 by Isabela Smith RN  Outcome: Ongoing  Goal: Ability to maintain adequate ventilation will improve  4/17/2022 1707 by Akanksha Christian RN  Outcome: Met This Shift  4/17/2022 0348 by Isabela Smith RN  Outcome: Ongoing  Goal: Ability to achieve and maintain adequate cardiopulmonary perfusion will improve  4/17/2022 1707 by Akanksha Christian RN  Outcome: Met This Shift  4/17/2022 0348 by Isabela Smith RN  Outcome: Ongoing     Problem: Cardiac:  Goal: Ability to maintain an adequate cardiac output will improve  4/17/2022 1707 by Akanksha Christian RN  Outcome: Met This Shift  4/17/2022 0348 by Isabela Smith RN  Outcome: Ongoing  Goal: Ability to maintain adequate ventilation will improve  4/17/2022 1707 by Akanksha Christian RN  Outcome: Met This Shift  4/17/2022 0348 by Isabela Smith RN  Outcome: Ongoing  Goal: Ability to achieve and maintain adequate cardiopulmonary perfusion will improve  4/17/2022 1707 by Akanksha Christian RN  Outcome: Met This Shift  4/17/2022 0348 by Isabela Smith RN  Outcome: Ongoing     Problem: Coping:  Goal: Ability to adjust to condition or change in health will improve  4/17/2022 1707 by Akanksha Christian RN  Outcome: Met This Shift  4/17/2022 0348 by Isabela Smith RN  Outcome: Ongoing  Goal: Communication of feelings regarding changes in body function or appearance will improve  4/17/2022 1707 by Akanksha Christian RN  Outcome: Met This Shift  4/17/2022 0348 by Isabela Smith RN  Outcome: Ongoing     Problem: Health Behavior:  Goal: Identification of resources available to assist in meeting health care needs will improve  4/17/2022 1707 by Akanksha Christian RN  Outcome: Met This Shift  4/17/2022 0348 by Isabela Smith RN  Outcome: Ongoing     Problem: Nutritional:  Goal: Maintenance of adequate nutrition will improve  4/17/2022 1707 by Akanksha Christian RN  Outcome:

## 2022-04-17 NOTE — PROGRESS NOTES
Dr. Angela Dee rounding at bedside. Discussed patient's current condition, plan of care,labs and medications, Patient may transfer out of unit.

## 2022-04-17 NOTE — FLOWSHEET NOTE
04/17/22 1103   Encounter Summary   Services provided to: Patient   Referral/Consult From: Ralph   Continue Visiting   (4/17/22)   Complexity of Encounter Low   Length of Encounter 15 minutes   Spiritual/Mandaeism   Type Spiritual support   Intervention Anointing   Sacraments   Sacrament of Sick-Anointing Patient declined anointing  (Ron Fowler 4/17/22)

## 2022-04-17 NOTE — PLAN OF CARE
Problem: Falls - Risk of:  Goal: Will remain free from falls  Description: Will remain free from falls  4/17/2022 0348 by Johnathan Braynt RN  Outcome: Ongoing  Note: Bed remains in lowest position, call light within reach. Patient remains free of falls at this time. RN will continue to monitor. Goal: Absence of physical injury  Description: Absence of physical injury  4/17/2022 0348 by Johnathan Bryant RN  Outcome: Ongoing  Note: Fall assessment performed and appropriate measures implemented. Room freed from clutter. Bed in lowest position with wheels locked. Call light in place. ID band in place. Problem: Activity:  Goal: Fatigue will decrease  Description: Fatigue will decrease  4/17/2022 0348 by Johnathan Bryant RN  Outcome: Ongoing  Goal: Ability to tolerate increased activity will improve  Description: Ability to tolerate increased activity will improve  4/17/2022 0348 by Johnathan Bryant RN  Outcome: Ongoing  Goal: Ability to maintain optimal joint mobility will improve  Description: Ability to maintain optimal joint mobility will improve  4/17/2022 0348 by Johnathan Bryant RN  Outcome: Ongoing     Problem:  Bowel/Gastric:  Goal: Ability to achieve a regular elimination pattern will improve  Description: Ability to achieve a regular elimination pattern will improve  4/17/2022 0348 by Johnathan Bryant RN  Outcome: Ongoing     Problem: Cardiac:  Goal: Ability to maintain an adequate cardiac output will improve  Description: Ability to maintain an adequate cardiac output will improve  4/17/2022 0348 by Johnathan Bryant RN  Outcome: Ongoing  Goal: Ability to maintain adequate ventilation will improve  Description: Ability to maintain adequate ventilation will improve  4/17/2022 0348 by Johnathan Bryant RN  Outcome: Ongoing  Goal: Ability to achieve and maintain adequate cardiopulmonary perfusion will improve  Description: Ability to achieve and maintain adequate cardiopulmonary perfusion will improve  4/17/2022 0348 by Johnathan Bryant RN  Outcome: Ongoing     Problem: Coping:  Goal: Ability to adjust to condition or change in health will improve  Description: Ability to adjust to condition or change in health will improve  4/17/2022 0348 by Ezekiel Shetty RN  Outcome: Ongoing  Goal: Communication of feelings regarding changes in body function or appearance will improve  Description: Communication of feelings regarding changes in body function or appearance will improve  4/17/2022 0348 by Ezekiel Shetty RN  Outcome: Ongoing     Problem: Health Behavior:  Goal: Identification of resources available to assist in meeting health care needs will improve  Description: Identification of resources available to assist in meeting health care needs will improve  4/17/2022 0348 by Ezekiel Shetty RN  Outcome: Ongoing     Problem: Nutritional:  Goal: Maintenance of adequate nutrition will improve  Description: Maintenance of adequate nutrition will improve  4/17/2022 0348 by Ezekiel Shetty RN  Outcome: Ongoing     Problem: Physical Regulation:  Goal: Signs and symptoms of infection will decrease  Description: Signs and symptoms of infection will decrease  4/17/2022 0348 by Ezekiel Shetty RN  Outcome: Ongoing  Goal: Will show no signs and symptoms of excessive bleeding  Description: Will show no signs and symptoms of excessive bleeding  4/17/2022 0348 by Ezekiel Shetty RN  Outcome: Ongoing  Goal: Complications related to the disease process, condition or treatment will be avoided or minimized  Description: Complications related to the disease process, condition or treatment will be avoided or minimized  4/17/2022 0348 by Ezekiel Shetty RN  Outcome: Ongoing     Problem: Safety:  Goal: Ability to remain free from injury will improve  Description: Ability to remain free from injury will improve  4/17/2022 0348 by Ezekiel Shetty RN  Outcome: Ongoing     Problem: Sensory:  Goal: Pain level will decrease  Description: Pain level will decrease  4/17/2022 0348 by Ezekiel Shetty RN  Outcome: Ongoing  Goal: General experience of comfort will improve  Description: General experience of comfort will improve  4/17/2022 0348 by Naheed Wilson RN  Outcome: Ongoing     Problem: Skin Integrity:  Goal: Skin integrity will improve  Description: Skin integrity will improve  4/17/2022 0348 by Naheed Wilson RN  Outcome: Ongoing  Goal: Signs of wound healing will improve  Description: Signs of wound healing will improve  4/17/2022 0348 by Naheed Wilson RN  Outcome: Ongoing  Goal: Will show no infection signs and symptoms  Description: Will show no infection signs and symptoms  4/17/2022 0348 by Naheed Wilson RN  Outcome: Ongoing  Goal: Absence of new skin breakdown  Description: Absence of new skin breakdown  4/17/2022 0348 by Naheed Wilson RN  Outcome: Ongoing     Problem: Tissue Perfusion:  Goal: Ability to maintain adequate tissue perfusion will improve  Description: Ability to maintain adequate tissue perfusion will improve  4/17/2022 0348 by Naheed Wilson RN  Outcome: Ongoing  Goal: Ability to maintain a stable neurologic state will improve  Description: Ability to maintain a stable neurologic state will improve  4/17/2022 0348 by Naheed Wilson RN  Outcome: Ongoing     Problem: Pain:  Goal: Pain level will decrease  Description: Pain level will decrease  4/17/2022 0348 by Naheed Wilson RN  Outcome: Ongoing  Goal: Control of acute pain  Description: Control of acute pain  4/17/2022 0348 by Naheed Wilson RN  Outcome: Ongoing  Note: Pain assessed at regular intervals. Pain medication given as shown in [facility] Goal: Control of chronic pain  Description: Control of chronic pain  4/17/2022 0348 by Naheed Wilson RN  Outcome: Ongoing     Problem: Musculor/Skeletal Functional Status  Goal: Highest potential functional level  4/17/2022 0348 by Naheed Wilson RN  Outcome: Ongoing  Note: Patient has generalized weakness.   Goal: Absence of falls  4/17/2022 0348 by Naheed Wilson RN  Outcome: Ongoing

## 2022-04-17 NOTE — PROGRESS NOTES
ASSESSMENT:  Principal Problem:    Gastrointestinal hemorrhage with melena  Active Problems:    GERD (gastroesophageal reflux disease)    Hepatic cirrhosis (HCC)    Hepatitis C virus infection resolved after antiviral drug therapy    Malignant neoplasm of lower third of esophagus (HCC)    Spinal stenosis of lumbar region with neurogenic claudication    Former smoker, 50+ pack years, quit 2016    Esophageal adenocarcinoma ([facility] [address]    Anemia    Acute kidney injury ([facility] [address]  Resolved Problems:    * No resolved hospital problems. *      The conditions that I am treating are Stable and are improving    PLAN:  1. Ultrasound of the liver to evaluate portal vein thrombosis  2. Advance diet to soft from tonight  3. To keep PPI drip  4. If he does well without signs of bleeding, may be transferred to stepdown unit this evening  5. Discussed with the patient regarding prognosis. He is having decompensated liver disease. 6. 2 g sodium diet  Discussed with the nursing staff regarding patient's care. Thank you for allowing me to participate in the care of your patient. Feel free to contact me with any questions or concerns. Arcadio Aragon MD    Note is dictated utilizing voice recognition software. Unfortunately this leads to occasional typographical errors. Please contact our office if you have any questions.

## 2022-04-17 NOTE — PROGRESS NOTES
[facility]    PROGRESS NOTE             4/17/2022    9:45 AM    Name:   Salvador Sarah  MRN:     [id number]     Acct:      [de-identified]   Room:   2007/2007-01  IP Day:  4  Admit Date:  4/13/2022 10:10 AM    PCP:  VASU Calderon  Code Status:  Full Code    Subjective:     C/C:   Chief Complaint   Patient presents with    Shortness of Breath     Interval History Status: improved. Patient states he feels well and has been tolerating a clear liquid diet well. Has not noticed additional bloody stools. Hemoglobin stable this morning. States he has shortness of breath again from the abdominal distension and is curious to know when he is able to have a paracentesis again. States he did not have regular taps scheduled as outpatient, just one on the day he was admitted. Brief History:     The patient is a 58 y.o.  Non- / non  male with PMH of alcoholic liver disease/cirrhosis, history of hepatitis C (treated).  He is a former smoker with 50+ pack year history, and admits to former heavy alcohol use.  Denies illicit or IV drug use.  Patient also has a history of GE junction adenocarcinoma s/p chemoradiation completed 6/9/21 and PET 7/23/21 with no recurrence, as well as most recent EGD on 1/21/22 which was negative for malignanacy.  This EGD also showed severe portal hypertensive gastropathy with some oozing of blood visualized.     He presented to the emergency department today from pain management clinic where he was scheduled to have an epidural steroid injection for back pain due to spinal stenosis.  However, he was unable to lie prone due to abdominal distention and shortness of breath and he was sent to the emergency department.      According to the patient, he was scheduled for therapeutic paracentesis today at [facility] this afternoon.  His most recent paracentesis was 4/5/2022 with 7.5 L removed.  Per patient, he states that it accumulated quickly afterwards and came back \"worse. \"  This was his second time having paracentesis done; previous paracentesis done in 2016.  He states that he has not been taking his spironolactone for the past few days, as he felt that it was making his blood pressure too low.  He reports that he took all other home medications, though.     Currently, patient states he has some shortness of breath which she attributes to abdominal distention, as well as nausea. However, denies  fever, chills, chest pain, abdominal pain, hematuria, or dark/bloody stool.  Last bowel movement was yesterday     In the emergency department, hemoglobin was found to be 3.6, recheck 3.5. Sodium 120.  Creatinine 1.42, baseline within normal limits.  Total bili 2.4.  INR 1.5.       He was given a dose of Protonix and 1 unit PRBCs in the ED.     He is being admitted to the hospital for the management of severe anemia and sepsis.     4/13: pt received 3 units PRBCs, venofer added per heme/onc     4/14: IR guided paracentesis 5L removed, FOBT positive, bowel movement revealed bright red blood per rectum; hgb 6.9 despite receiving 6 units of blood and FFP, emergent EGD overnight following additional episodes of hematochezia, no annamaria bleeding noted, may need colonoscopy     4/15: more bloody stools, plan for colonoscopy tomorrow, may need nuclear bleeding scan, as well    4/16: colonoscopy, no source of active bleed visualized, hemoglobin stabilizing, platelets trending down, hyponatremia improving but still low    Review of Systems:     Review of Systems   Constitutional: Negative for appetite change, chills and fever. Respiratory: Positive for shortness of breath (attributed to abdominal distension). Gastrointestinal: Positive for abdominal distention. Negative for abdominal pain, blood in stool, constipation, diarrhea, nausea and vomiting.    Musculoskeletal: Positive for back pain (2/2 ascites; states if he could get his abdomen drained regularly, he believes it woudl help his back pain). Neurological: Negative for dizziness, light-headedness and headaches. All other systems reviewed and are negative. Medications: Allergies: Allergies   Allergen Reactions    Bee Pollen Other (See Comments)     Runny nose, watery eyes    Pollen Extract      Runny nose, watery eyes       Current Meds:   Scheduled Meds:    sodium chloride flush  5-40 mL IntraVENous 2 times per day    atorvastatin  20 mg Oral Nightly    [Held by provider] FLUoxetine  20 mg Oral Daily    sodium chloride flush  5-40 mL IntraVENous BID    rifaximin  550 mg Oral BID    lactulose  20 g Oral TID    iron sucrose  200 mg IntraVENous Q24H     Continuous Infusions:    sodium chloride      pantoprazole 8 mg/hr (04/17/22 0517)    dextrose 5 % and 0.9 % NaCl 50 mL/hr at 04/17/22 0114    sodium chloride      sodium chloride       PRN Meds: sodium chloride, oxyCODONE, potassium chloride **OR** potassium alternative oral replacement **OR** potassium chloride, magnesium sulfate, sodium chloride, sodium chloride flush, sodium chloride, ondansetron **OR** ondansetron, polyethylene glycol, midodrine    Data:     Past Medical History:   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 [address] Anxiety, Arthritis, Back pain, chronic, Lezama esophagus, BPH (benign prostatic hypertrophy), Cholelithiasis, Cirrhosis ([facility] [address] COVID-19, COVID-19 vaccine series completed, DDD (degenerative disc disease), lumbar, Depression, Esophageal cancer ([facility] [address] [facility] Fatty liver, GERD (gastroesophageal reflux disease), Hep C w/o coma, chronic ([facility] [address] History of alcohol abuse, History of blood transfusion, History of colon polyps, History of tobacco abuse, [place] (hard of hearing), Hyperlipidemia, Hypertension, Port-A-Cath in place, Sciatica, Spinal stenosis, Stomach ulcer, Tubular adenoma of colon, Vitamin D deficiency, and Wears glasses.     Social History: reports that he quit smoking about 5 years ago. He has a 45.00 pack-year smoking history. He has never used smokeless tobacco. He reports previous alcohol use. He reports previous drug use. Frequency: 1.00 time per week. Family History:   Family History   Problem Relation Age of Onset    Cancer Mother         pancreatic    Cancer Father         bone    Diabetes Sister     Asthma Brother        Vitals:  BP (!) 132/57   Pulse 82   Temp 98.5 °F (36.9 °C) (Axillary)   Resp 18   Wt 228 lb 6.3 oz (103.6 kg)   SpO2 100%   BMI 32.77 kg/m²   Temp (24hrs), Av.5 °F (36.9 °C), Min:97.5 °F (36.4 °C), Max:99.5 °F (37.5 °C)    No results for input(s): POCGLU in the last 72 hours. I/O(24Hr):     Intake/Output Summary (Last 24 hours) at 2022 0945  Last data filed at 2022 0827  Gross per 24 hour   Intake 3589.58 ml   Output 0 ml   Net 3589.58 ml       Labs:    CBC with Differential:    Lab Results   Component Value Date    WBC 7.4 2022    RBC 3.26 2022    RBC 4.75 2012    HGB 8.9 2022    HCT 28.0 2022    PLT 77 2022     2012    MCV 85.8 2022    MCH 27.2 2022    MCHC 31.7 2022    RDW 19.2 2022    NRBC 1 2022    LYMPHOPCT 3 2022    MONOPCT 11 2022    MYELOPCT 1 2021    BASOPCT 0 2022    MONOSABS 0.91 2022    LYMPHSABS 0.25 2022    EOSABS 0.08 2022    BASOSABS 0.00 2022    DIFFTYPE NOT REPORTED 2022     BMP:    Lab Results   Component Value Date     2022    K 4.3 2022     2022    CO2 23 2022    BUN 7 2022    LABALBU 2.9 04/15/2022    LABALBU 4.7 2012    CREATININE 0.45 2022    CALCIUM 7.5 2022    GFRAA >60 2022    LABGLOM >60 2022    GLUCOSE 128 2022    GLUCOSE 65 2012     Sodium:    Lab Results   Component Value Date     2022     Ionized Calcium:  No results found for: [facility]      Radiology:    XR CHEST PORTABLE    Result Date: 4/15/2022  EXAMINATION: ONE XRAY VIEW OF THE CHEST 4/15/2022 7:59 am COMPARISON: 02/02/2022, 12/21/2021 HISTORY: ORDERING SYSTEM PROVIDED HISTORY: Dyspnea TECHNOLOGIST PROVIDED HISTORY: Dyspnea Reason for Exam: Dyspnea FINDINGS: Right chest port terminates in the superior vena cava. Mild pulmonary vascular congestion. No focal pulmonary opacities. Calcified granulomata and calcified mediastinal nodes from prior granulomatous infection. Cardiomegaly which is similar to 4 months prior. No acute bony findings. Mild pulmonary vascular congestion. Cardiomegaly. IR US GUIDED PARACENTESIS    Result Date: 4/14/2022  PROCEDURE: PARACENTESIS WITH IMAGE GUIDANCE US ABDOMEN LIMITED 4/14/2022 HISTORY: ORDERING SYSTEM PROVIDED HISTORY: ascites remove 5 liters only TECHNOLOGIST PROVIDED HISTORY: ascites remove 5 liters only TECHNIQUE: Informed consent was obtained after a detailed explanation of the procedure including risks, benefits, and alternatives. Universal protocol was followed. A limited ultrasound of the abdomen was performed and shows a moderate to large amount of ascites. The right abdomen was prepped and draped in sterile fashion and local anesthesia was achieved with lidocaine. A 5 Syrian needle sheath was advanced into ascites using realtime ultrasound guidance and paracentesis was performed. The patient tolerated the procedure well. EBL: None FINDINGS: Limited ultrasound of the abdomen demonstrates ascites. A total of 5 L of slightly turbid yellow colored fluid was removed. Additional fluid remains on completion. Successful ultrasound-guided paracentesis.      IR US GUIDED PARACENTESIS    Result Date: 4/5/2022  PROCEDURE: IR US GUIDED PARACENTESIS W IMAGING 4/5/2022 HISTORY: ORDERING SYSTEM PROVIDED HISTORY: Ascites due to alcoholic cirrhosis (HCC) TECHNIQUE: Sonography was utilized CONTRAST: None SEDATION: None FLUOROSCOPY DOSE AND TYPE OR TIME AND EXPOSURES: None DESCRIPTION OF PROCEDURE: Informed consent was obtained after a detailed explanation of the procedure including risks, benefits, and alternatives. Universal protocol was observed. Preprocedure ultrasound shows a dominant fluid pocket in the right lowerquadrant. Using ultrasound guidance (image attached to the medical record), the fluid pocket was accessed with a 5 [place] Lorrie Yueh needle with aspiration of inch clear yellow fluid. Vacuum bottle paracentesis was performed and approximately 7.25 L was removed. the patient tolerated the procedure well and left the department in good condition. Dr. Javier So was present. Patient received IV albumin replacement. FINDINGS: 7.25 L drained     Successful ultrasound-guided therapeutic paracentesis         Physical Examination:        Physical Exam  Constitutional:       General: He is not in acute distress. Appearance: Normal appearance. He is not ill-appearing. Eyes:      General: No scleral icterus. Extraocular Movements: Extraocular movements intact. Conjunctiva/sclera: Conjunctivae normal.   Cardiovascular:      Rate and Rhythm: Normal rate and regular rhythm. Pulmonary:      Effort: Pulmonary effort is normal. No respiratory distress. Breath sounds: Normal breath sounds. No wheezing, rhonchi or rales. Abdominal:      General: Bowel sounds are normal. There is distension (fluid wave present). Palpations: Abdomen is soft. Tenderness: There is no abdominal tenderness. Musculoskeletal:      Right lower leg: No edema. Left lower leg: No edema. Skin:     Coloration: Skin is jaundiced (improved from prior). Neurological:      General: No focal deficit present. Mental Status: He is alert and oriented to person, place, and time.    Psychiatric:         Mood and Affect: Mood normal.         Behavior: Behavior normal.         Assessment:        Primary Problem  Gastrointestinal hemorrhage with melena    Active Hospital Problems    Diagnosis Date Noted    Anemia [D64.9] 04/13/2022    Acute kidney injury ([facility] [address] [N17.9] 04/13/2022    [facility] [C15.9]     Former smoker, 50+ pack years, quit 2016 [Z87.891] 12/20/2021    Spinal stenosis of lumbar region with neurogenic claudication [M48.062] 12/14/2021    Malignant neoplasm of lower third of esophagus ([facility] [address] [C15.5]     Gastrointestinal hemorrhage with melena [K92.1]     Hepatitis C virus infection resolved after antiviral drug therapy [Z86.19] 12/23/2020    Hepatic cirrhosis ([facility] [address] [K74.60] 09/15/2016    GERD (gastroesophageal reflux disease) [K21.9] 04/19/2012       Plan:        Severe anemia in the setting of cirrhosis and portal hypertension, likely secondary to GI bleed, stable   -Hemoglobin 3.5 on admission, 8.9 this morning  -Has received 7 units PRBCs and 1 unit FFP total since admission (+1 unit in ED)  -Sandostatin drip OFF - 72h course completed  -Protonix drip continued per GI   -FOBT positive and pt is having melena/hematochezia  -EGD and colonoscopy did not reveal source of active bleed  -Monitor hgb, transfuse if <7  -GI is following    Thrombocytopenia, trending down  -platelets 627 on admission  -Has received 7 units of blood, 1 unit FFP  -Platelets this morning 77, trending down, continue to monitor  -does not appear to be bleeding currently, hgb stable      Hyponatremia, improving  -Sodium 120 on admission, 129 this AM  -Pt is not chronically hyponatremic  -Patient is likely intravascularly volume depleted 2/2 third spacing from ascites  -IV fluid hydration with D5 NS at 50cc/h     Ascites 2/2 cirrhotic liver disease  -cirrhosis 2/2 heavy alcohol use in the past; patient also has history of hepatitis C s/p treatment  -Patient had IR guided paracentesis on 4/5/2022 and had rapid reaccumulation of ascitic fluid during this time; was scheduled for paracentesis on day of admission  -s/p IR guided paracentesis 4/14; 5L max removed per GI recommendations  -fluid has reaccumulated and abdomen is pretty distended today  -MELD score 27, Child-Laguna score 8    -Will need outpatient GI to coordinate need for regular therapeutic paracentesis     Acute kidney injury, resolved  -pt does not have history of CKD or hepatorenal syndrome  -Creatinine 1.42 on admission, wnl now  -FeUrea 19.5% indicative of prerenal injury  -continue IV fluids D5 NS at 50cc/h    Hypokalemia, resolved  -Potassium 4.3 this AM  -Continue to monitor and replace per protocol as needed    Sepsis, ruled out   -Tachycardia and WBCs 14.2 on admission  -Lactic acid 2.0  -SBP in 100s, midodrine 5mg TIID PRN for SBP <110,   -Urinalysis positive for nitrites and leukocyte esterase, no hgb, WBC 0-2, no growth on urine culture - UTI ruled out  -Leukocytosis resolved  -blood cultures no growth  -IV rocephin for UTI and SBP ppx, as well as rifaximin; SBP ruled out based on results of fluid analysis (see below), d/c rocephin  -IR guided paracentesis 4/14; culture no growth 10 hours, cell count not indicative of SBP, only 63 WBCs/mm3, 19% neutrophils     Portal hypertension, with findings of portal hypertensive gastropathy on EGD, held home lasix, nadolol, and spironolactone d/t hypotension     History of GE junction adenocarcinoma, in remission  -pt completed chemoradiation 6/9/2021; PET scan 7/23/21 showed no recurrence  -Most recent EGD done 1/21/22: flat 3-4mm polyp at GE junction which showed no malignancy  -Polypoid lesion seen on EGD 4/15, not biopsied because of bleeding risk, will need outpatient follow up     GI PPx: Protonix drip  DVT prophylaxis: SCD cuffs, no chemical anticoagulation at this time due to severe anemia and possible active bleed  Diet: clear liquids    Carissa Jang MD  4/17/2022  9:45 AM     Attending Physician Statement  I have discussed the care of Ellie Turcios with the resident team. I have examined the patient myself and taken ros and hpi ,

## 2022-04-17 NOTE — FLOWSHEET NOTE
04/16/22 2006   Treatment Team Notification   Reason for Communication Evaluate   Team Member Name Dr. Rufina Rose Team Role Attending Provider   Method of Communication Secure Message   Response See orders   Dr. Washburn Commander notified that patient is requesting pain medication. Percocet is currently ordered for pain but only PRN daily. Patient was given pain medication this morning.  Orders placed by MD.

## 2022-04-17 NOTE — PROGRESS NOTES
_                         Today's Date: 4/17/2022  Patient Name: Dorcas Ortiz  Date of admission: 4/13/2022 10:10 AM  Patient's age: 57 y.o., 1960  Admission Dx: Severe anemia [D64.9]  Anemia, unspecified type [D64.9]      Requesting Physician: Amber Irizarry MD    CHIEF COMPLAINT: Excessive weakness and fatigue. Severe anemia. Esophageal cancer. SUBJECTIVE:  .patient was seen and examined. Clinically stable. Hemoglobin stable. .  He had GI evaluation. No CP. No dizziness. No fever. EGD showed no obvious source of active bleeding    BRIEF CASE HISTORY:    The patient is a 57 y.o.  male who is admitted to the hospital for further management of severe anemia. Patient presents with extreme weakness and fatigue. Symptoms started about 3 to 4 days ago. He has no dizziness. He has shortness of breath on minimal exertion. No abdominal pain. No chest pain. No difficulty swallowing. No nausea or vomiting. Patient denies any melena or hematochezia. No hematemesis. The patient is known to our practice. 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 Patient received chemoradiation with very good results. Patient's labs showed severe anemia with hemoglobin of 3.6. He was hospitalized with received blood transfusion. He is slightly better. Patient has no other complaints with treatment plan. No fever or signs of infection. No respiratory distress.     Past Medical History:   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 ([address] Anxiety, Arthritis, Back pain, chronic, Lez[place] esophagus, BPH (benign prostatic hypertrophy), Cholelithiasis, Cirrhosis ([address] COVID-19, COVID-19 vaccine series completed, DDD (degenerative disc disease), lumbar, Depression, Esophageal cancer ([address] Esophageal varices ([address] Fatty liver, GERD (gastroesophageal reflux disease), Hep C w/o coma, chronic ([address] History of alcohol abuse, History of blood transfusion, History of colon polyps, History of tobacco abuse, [place] (hard of hearing), Hyperlipidemia, Hypertension, Port-A-Cath in place, Sciatica, Spinal stenosis, Stomach ulcer, Tubular adenoma of colon, Vitamin D deficiency, and Wears glasses. Past Surgical History:   has a past surgical history that includes Bunionectomy; Nasal septum surgery; other surgical history (01/04/16); Colonoscopy; Colonoscopy (10/05/2016); other surgical history (11/21/2016); other surgical history (12/19/2016); knee surgery (Left); Bunionectomy (Left); Endoscopy, colon, diagnostic; pr revise median n/carpal tunnel surg (Right, 8/29/2017); Carpal tunnel release (Right); pr revise median n/carpal tunnel surg (Left, 10/31/2017); Colonoscopy (N/A, 3/30/2018); Colonoscopy (03/30/2018); pr njx aa&/strd tfrml epi lumbar/sacral 1 level (Bilateral, 9/6/2018); other surgical history (09/28/2018); pr njx aa&/strd tfrml epi lumbar/sacral 1 level (N/A, 9/28/2018); Pain management procedure (Left, 7/9/2020); Pain management procedure (Left, 7/20/2020); Pain management procedure (Bilateral, 8/17/2020); other surgical history (Right, 11/23/2020); Nerve Block (Right, 11/23/2020); Pain management procedure (Bilateral, 12/7/2020); Upper gastrointestinal endoscopy (N/A, 12/29/2020); Upper gastrointestinal endoscopy (N/A, 2/2/2021); Upper gastrointestinal endoscopy (N/A, 2/12/2021); Upper gastrointestinal endoscopy (2/12/2021); Lawn tooth extraction; IR PORT PLACEMENT > 5 YEARS (4/19/2021); Upper gastrointestinal endoscopy (N/A, 8/31/2021); Upper gastrointestinal endoscopy (N/A, 1/21/2022); Upper gastrointestinal endoscopy (N/A, 4/15/2022); and Colonoscopy (N/A, 4/16/2022). Family History: family history includes Asthma in his brother; Cancer in his father and mother; Diabetes in his sister. Social History:   reports that he quit smoking about 5 years ago. He has a 45.00 pack-year smoking history.  He has never used smokeless tobacco. He reports previous alcohol use. He reports previous drug use. Frequency: 1.00 time per week. Medications:    Prior to Admission medications    Medication Sig Start Date End Date Taking? Authorizing Provider   cyclobenzaprine (FLEXERIL) 10 MG tablet Take 10 mg by mouth daily as needed for Muscle spasms   Yes Historical Provider, MD   oxyCODONE-acetaminophen (PERCOCET) 5-325 MG per tablet Take 1 tablet by mouth daily as needed for Pain for up to 30 days.  4/6/22 5/6/22  MASSIMO Zhang CNP   furosemide (LASIX) 20 MG tablet take 1 tablet by mouth once daily 4/4/22   MASSIMO Chan - NP   nadolol (CORGARD) 20 MG tablet Take 1 tablet by mouth daily 2/8/22   Claudia Ortiz MD   ferrous sulfate (IRON 325) 325 (65 Fe) MG tablet Take 1 tablet by mouth 2 times daily 1/12/22   VASU De La Torre   omeprazole (PRILOSEC) 40 MG delayed release capsule take 1 capsule by mouth EVERY MORNING BEFORE BREAKFAST 11/12/21   Uri Choudhury MD   FLUoxetine (PROZAC) 20 MG capsule take 1 capsule by mouth once daily 11/12/21   VASU De La Torre   lidocaine-prilocaine (EMLA) 2.5-2.5 % cream Apply topically 45-60 mins prior to needle poke daily PRN 4/22/21   Uri Choudhury MD   atorvastatin (LIPITOR) 20 MG tablet take 1 tablet by mouth at bedtime 2/23/21   MASSIMO Duarte - CNP   spironolactone (ALDACTONE) 50 MG tablet take 1 tablet by mouth once daily 2/23/21   MASSIMO Tran - CNP     Current Facility-Administered Medications   Medication Dose Route Frequency Provider Last Rate Last Admin    0.9 % sodium chloride infusion   IntraVENous PRN Claudia Ortiz MD        pantoprazole (PROTONIX) 80 mg in sodium chloride 0.9 % 100 mL infusion  8 mg/hr IntraVENous Continuous Claudia Ortiz MD 10 mL/hr at 04/17/22 0517 8 mg/hr at 04/17/22 0517    oxyCODONE (ROXICODONE) immediate release tablet 5 mg  5 mg Oral Q6H PRN Argentina Man MD   5 mg at 04/17/22 0823    potassium chloride (KLOR-CON M) extended release tablet 40 mEq  40 mEq Oral PRN Aylin Herndon MD   40 mEq at 04/16/22 1445    Or    potassium bicarb-citric acid (EFFER-K) effervescent tablet 40 mEq  40 mEq Oral PRN Aylin Herndon MD        Or    potassium chloride 10 mEq/100 mL IVPB (Peripheral Line)  10 mEq IntraVENous PRN Aylin Herndon MD        dextrose 5 % and 0.9 % sodium chloride infusion   IntraVENous Continuous Aylin Herndon MD 50 mL/hr at 04/17/22 0114 New Bag at 04/17/22 0114    magnesium sulfate 1000 mg in dextrose 5% 100 mL IVPB  1,000 mg IntraVENous PRN Aylin Herndon MD        0.9 % sodium chloride infusion   IntraVENous PRN Aylin Herndon MD        sodium chloride flush 0.9 % injection 5-40 mL  5-40 mL IntraVENous 2 times per day Aylin Herndon MD   10 mL at 04/16/22 2058    sodium chloride flush 0.9 % injection 5-40 mL  5-40 mL IntraVENous PRN Aylin Herndon MD        0.9 % sodium chloride infusion  25 mL IntraVENous PRN Aylin Herndon MD        ondansetron (ZOFRAN-ODT) disintegrating tablet 4 mg  4 mg Oral Q8H PRN Augusto Cordova MD        Or    ondansetron (ZOFRAN) injection 4 mg  4 mg IntraVENous Q6H PRN Aylin Herndon MD   4 mg at 04/14/22 0117    polyethylene glycol (GLYCOLAX) packet 17 g  17 g Oral Daily PRN Aylin Herndon MD        midodrine (PROAMATINE) tablet 5 mg  5 mg Oral TID PRN Aylin Herndon MD        atorvastatin (LIPITOR) tablet 20 mg  20 mg Oral Nightly Aylin Herndon MD   20 mg at 04/16/22 2043    [Held by provider] FLUoxetine (PROZAC) capsule 20 mg  20 mg Oral Daily Lucian Harley MD   20 mg at 04/15/22 0753    sodium chloride flush 0.9 % injection 5-40 mL  5-40 mL IntraVENous BID Aylin Herndon MD   10 mL at 04/17/22 0824    rifaximin (XIFAXAN) tablet 550 mg  550 mg Oral BID Aylin Herndon MD   550 mg at 04/17/22 0823    lactulose (CHRONULAC) 10 GM/15ML solution 20 g  20 g Oral TID Augusto COONEY distress. Looks pale. Mental status - alert and oriented  Eyes - pupils equal and reactive, extraocular eye movements intact  Ears - bilateral TM's and external ear canals normal  Nose - normal and patent, no erythema, discharge or polyps  Mouth - mucous membranes moist, pharynx normal without lesions  Neck - supple, no significant adenopathy  Lymphatics - no palpable lymphadenopathy, no hepatosplenomegaly  Chest - clear to auscultation, no wheezes, rales or rhonchi, symmetric air entry  Heart - normal rate, regular rhythm, normal S1, S2, no murmurs, rubs, clicks or gallops  Abdomen - soft, nontender, nondistended, no masses or organomegaly  Neurological - alert, oriented, normal speech, no focal findings or movement disorder noted  Musculoskeletal - no joint tenderness, deformity or swelling  Extremities - peripheral pulses normal, no pedal edema, no clubbing or cyanosis  Skin - normal coloration and turgor, no rashes, no suspicious skin lesions noted           DATA:      Labs:       CBC:   Recent Labs     04/16/22  0711 04/16/22  1221 04/16/22  1837 04/17/22  0430   WBC 8.3  --   --  7.4   HGB 9.2*   < > 8.7* 8.9*   HCT 27.9*   < > 29.6* 28.0*   PLT 83*  --   --  77*    < > = values in this interval not displayed. BMP:   Recent Labs     04/16/22  0711 04/16/22  1221 04/17/22  0430 04/17/22  1636   *   < > 129* 130*   K 3.3*  --  4.3  --    CO2 23  --  23  --    BUN 12  --  7*  --    CREATININE 0.57*  --  0.45*  --    LABGLOM >60  --  >60  --    GLUCOSE 133*  --  128*  --     < > = values in this interval not displayed. PT/INR:   No results for input(s): PROTIME, INR in the last 72 hours. APTT:  No results for input(s): APTT in the last 72 hours.   LIVER PROFILE:  Recent Labs     04/15/22  1132   AST 34   ALT 17   LABALBU 2.9*     XR CHEST PORTABLE  Narrative: EXAMINATION:  ONE XRAY VIEW OF THE CHEST    4/15/2022 7:59 am    COMPARISON:  02/02/2022, 12/21/2021    HISTORY:  ORDERING SYSTEM PROVIDED HISTORY: Dyspnea  TECHNOLOGIST PROVIDED HISTORY:  Dyspnea  Reason for Exam: Dyspnea    FINDINGS:  Right chest port terminates in the superior vena cava. Mild pulmonary  vascular congestion. No focal pulmonary opacities. Calcified granulomata  and calcified mediastinal nodes from prior granulomatous infection. Cardiomegaly which is similar to 4 months prior. No acute bony findings. Impression: Mild pulmonary vascular congestion. Cardiomegaly. IMPRESSION:    Primary Problem  Gastrointestinal hemorrhage with melena    Active Hospital Problems    Diagnosis Date Noted    Anemia [D64.9] 04/13/2022    Acute kidney injury ([facility] [N17.9] 04/13/2022    Esophageal adenocarcinoma ([facility] [C15.9]     Former smoker, 50+ pack years, quit 2016 [Z87.891] 12/20/2021    Spinal stenosis of lumbar region with neurogenic claudication [M48.062] 12/14/2021    Malignant neoplasm of lower third of esophagus ([facility] [C15.5]     Gastrointestinal hemorrhage with melena [K92.1]     Hepatitis C virus infection resolved after antiviral drug therapy [Z86.19] 12/23/2020    Hepatic cirrhosis ([facility] [K74.60] 09/15/2016    GERD (gastroesophageal reflux disease) [K21.9] 04/19/2012       RECOMMENDATIONS:  1. Records and labs and images were reviewed and discussed with the patient. 2. Patient had esophageal cancer of the lower third of the esophagus. Status post chemoradiation with good results. 3. Current presentation with severe anemia status post transfusion and iron infusion  4. Appreciate GI input. 5.  We will continue to transfuse to maintain hemoglobin above 7   6. I will continue  IV iron infusion. Discussed with patient and Nurse.     Susana Espino MD, MD [facility]                            This note is created with the assistance of a speech recognition program.  While intending to generate a document that actually reflects the content of the visit, the document can still have some errors including those of syntax and sound a like substitutions which may escape proof reading. It such instances, actual meaning can be extrapolated by contextual diversion.

## 2022-04-17 NOTE — CARE COORDINATION
ONGOING DISCHARGE PLAN:    Unable to speak to pt. At this time. Currently sound asleep, no family at the bedside. Pt. Remains on Sandostatin/Protonix GTT's.     GI following, HGB today. 8.9, IV FE. Per Notes, US of Liver, Soft diet tonight. PT/OT on board. [facility]- [facility] following. LSW has been following for [facility]. Na 129. ?PCU today. Will continue to follow for additional discharge needs.     Electronically signed by Jorge Moon RN on 4/17/2022 at 3:12 PM

## 2022-04-18 ENCOUNTER — APPOINTMENT (OUTPATIENT)
Dept: INTERVENTIONAL RADIOLOGY/VASCULAR | Age: 62
DRG: 378 | End: 2022-04-18
Payer: MEDICARE

## 2022-04-18 LAB
A1 LECTIN: NEGATIVE
ABO/RH: NORMAL
ANION GAP SERPL CALCULATED.3IONS-SCNC: 7 MMOL/L (ref 9–17)
ANTIBODY SCREEN: NEGATIVE
ARM BAND NUMBER: NORMAL
BLD PROD TYP BPU: NORMAL
BLOOD BANK BLOOD PRODUCT EXPIRATION DATE: NORMAL
BLOOD BANK ISBT PRODUCT BLOOD TYPE: 5100
BLOOD BANK ISBT PRODUCT BLOOD TYPE: 7300
BLOOD BANK ISBT PRODUCT BLOOD TYPE: 8400
BLOOD BANK PRODUCT CODE: NORMAL
BLOOD BANK UNIT TYPE AND RH: NORMAL
BPU ID: NORMAL
BUN BLDV-MCNC: 4 MG/DL (ref 8–23)
CALCIUM SERPL-MCNC: 7.6 MG/DL (ref 8.6–10.4)
CHLORIDE BLD-SCNC: 101 MMOL/L (ref 98–107)
CO2: 21 MMOL/L (ref 20–31)
CREAT SERPL-MCNC: <0.4 MG/DL (ref 0.7–1.2)
CROSSMATCH RESULT: NORMAL
CULTURE: NORMAL
CULTURE: NORMAL
DISPENSE STATUS BLOOD BANK: NORMAL
EXPIRATION DATE: NORMAL
GFR AFRICAN AMERICAN: ABNORMAL ML/MIN
GFR NON-AFRICAN AMERICAN: ABNORMAL ML/MIN
GFR SERPL CREATININE-BSD FRML MDRD: ABNORMAL ML/MIN/{1.73_M2}
GLUCOSE BLD-MCNC: 132 MG/DL (ref 70–99)
HCT VFR BLD CALC: 29.1 % (ref 41–53)
HEMOGLOBIN: 9.3 G/DL (ref 13.5–17.5)
MCH RBC QN AUTO: 27.8 PG (ref 26–34)
MCHC RBC AUTO-ENTMCNC: 32 G/DL (ref 31–37)
MCV RBC AUTO: 86.9 FL (ref 80–100)
PDW BLD-RTO: 19.8 % (ref 11.5–14.9)
PLATELET # BLD: 78 K/UL (ref 150–450)
PMV BLD AUTO: 6.9 FL (ref 6–12)
POTASSIUM SERPL-SCNC: 4.2 MMOL/L (ref 3.7–5.3)
RBC # BLD: 3.35 M/UL (ref 4.5–5.9)
SODIUM BLD-SCNC: 129 MMOL/L (ref 135–144)
SPECIMEN DESCRIPTION: NORMAL
SPECIMEN DESCRIPTION: NORMAL
SURGICAL PATHOLOGY REPORT: NORMAL
TRANSFUSION STATUS: NORMAL
UNIT DIVISION: 0
UNIT ISSUE DATE/TIME: NORMAL
WBC # BLD: 7.5 K/UL (ref 3.5–11)

## 2022-04-18 PROCEDURE — 49083 ABD PARACENTESIS W/IMAGING: CPT

## 2022-04-18 PROCEDURE — 2580000003 HC RX 258: Performed by: INTERNAL MEDICINE

## 2022-04-18 PROCEDURE — 97166 OT EVAL MOD COMPLEX 45 MIN: CPT

## 2022-04-18 PROCEDURE — C9113 INJ PANTOPRAZOLE SODIUM, VIA: HCPCS | Performed by: INTERNAL MEDICINE

## 2022-04-18 PROCEDURE — 85027 COMPLETE CBC AUTOMATED: CPT

## 2022-04-18 PROCEDURE — 97116 GAIT TRAINING THERAPY: CPT

## 2022-04-18 PROCEDURE — 36415 COLL VENOUS BLD VENIPUNCTURE: CPT

## 2022-04-18 PROCEDURE — 6370000000 HC RX 637 (ALT 250 FOR IP): Performed by: INTERNAL MEDICINE

## 2022-04-18 PROCEDURE — APPSS30 APP SPLIT SHARED TIME 16-30 MINUTES: Performed by: NURSE PRACTITIONER

## 2022-04-18 PROCEDURE — 6360000002 HC RX W HCPCS: Performed by: INTERNAL MEDICINE

## 2022-04-18 PROCEDURE — 6370000000 HC RX 637 (ALT 250 FOR IP): Performed by: NURSE PRACTITIONER

## 2022-04-18 PROCEDURE — 80048 BASIC METABOLIC PNL TOTAL CA: CPT

## 2022-04-18 PROCEDURE — 2060000000 HC ICU INTERMEDIATE R&B

## 2022-04-18 PROCEDURE — 99233 SBSQ HOSP IP/OBS HIGH 50: CPT | Performed by: INTERNAL MEDICINE

## 2022-04-18 PROCEDURE — 0W9G3ZZ DRAINAGE OF PERITONEAL CAVITY, PERCUTANEOUS APPROACH: ICD-10-PCS | Performed by: INTERNAL MEDICINE

## 2022-04-18 PROCEDURE — 99232 SBSQ HOSP IP/OBS MODERATE 35: CPT | Performed by: INTERNAL MEDICINE

## 2022-04-18 PROCEDURE — 6360000002 HC RX W HCPCS: Performed by: RADIOLOGY

## 2022-04-18 PROCEDURE — 93005 ELECTROCARDIOGRAM TRACING: CPT | Performed by: STUDENT IN AN ORGANIZED HEALTH CARE EDUCATION/TRAINING PROGRAM

## 2022-04-18 PROCEDURE — 2709999900 IR US GUIDED PARACENTESIS

## 2022-04-18 PROCEDURE — P9047 ALBUMIN (HUMAN), 25%, 50ML: HCPCS | Performed by: RADIOLOGY

## 2022-04-18 PROCEDURE — 6370000000 HC RX 637 (ALT 250 FOR IP): Performed by: STUDENT IN AN ORGANIZED HEALTH CARE EDUCATION/TRAINING PROGRAM

## 2022-04-18 RX ORDER — MORPHINE SULFATE 2 MG/ML
2 INJECTION, SOLUTION INTRAMUSCULAR; INTRAVENOUS EVERY 4 HOURS PRN
Status: DISCONTINUED | OUTPATIENT
Start: 2022-04-18 | End: 2022-04-21 | Stop reason: HOSPADM

## 2022-04-18 RX ORDER — FUROSEMIDE 40 MG/1
40 TABLET ORAL DAILY
Status: DISCONTINUED | OUTPATIENT
Start: 2022-04-18 | End: 2022-04-20

## 2022-04-18 RX ORDER — ALBUMIN (HUMAN) 12.5 G/50ML
50 SOLUTION INTRAVENOUS ONCE
Status: COMPLETED | OUTPATIENT
Start: 2022-04-18 | End: 2022-04-18

## 2022-04-18 RX ORDER — FUROSEMIDE 10 MG/ML
40 INJECTION INTRAMUSCULAR; INTRAVENOUS ONCE
Status: DISCONTINUED | OUTPATIENT
Start: 2022-04-18 | End: 2022-04-20

## 2022-04-18 RX ORDER — SPIRONOLACTONE 25 MG/1
50 TABLET ORAL DAILY
Status: DISCONTINUED | OUTPATIENT
Start: 2022-04-18 | End: 2022-04-18

## 2022-04-18 RX ORDER — SPIRONOLACTONE 25 MG/1
100 TABLET ORAL DAILY
Status: DISCONTINUED | OUTPATIENT
Start: 2022-04-19 | End: 2022-04-20

## 2022-04-18 RX ORDER — NADOLOL 20 MG/1
20 TABLET ORAL DAILY
Status: DISCONTINUED | OUTPATIENT
Start: 2022-04-18 | End: 2022-04-21 | Stop reason: HOSPADM

## 2022-04-18 RX ADMIN — SODIUM CHLORIDE, PRESERVATIVE FREE 10 ML: 5 INJECTION INTRAVENOUS at 16:10

## 2022-04-18 RX ADMIN — RIFAXIMIN 550 MG: 550 TABLET ORAL at 08:19

## 2022-04-18 RX ADMIN — SODIUM CHLORIDE, PRESERVATIVE FREE 10 ML: 5 INJECTION INTRAVENOUS at 09:00

## 2022-04-18 RX ADMIN — NADOLOL 20 MG: 20 TABLET ORAL at 13:04

## 2022-04-18 RX ADMIN — LACTULOSE 20 G: 20 SOLUTION ORAL at 13:04

## 2022-04-18 RX ADMIN — LACTULOSE 20 G: 20 SOLUTION ORAL at 08:19

## 2022-04-18 RX ADMIN — RIFAXIMIN 550 MG: 550 TABLET ORAL at 20:13

## 2022-04-18 RX ADMIN — SODIUM CHLORIDE, PRESERVATIVE FREE 10 ML: 5 INJECTION INTRAVENOUS at 16:09

## 2022-04-18 RX ADMIN — SODIUM CHLORIDE, PRESERVATIVE FREE 10 ML: 5 INJECTION INTRAVENOUS at 20:14

## 2022-04-18 RX ADMIN — FUROSEMIDE 40 MG: 40 TABLET ORAL at 16:34

## 2022-04-18 RX ADMIN — OXYCODONE HYDROCHLORIDE 5 MG: 5 TABLET ORAL at 02:38

## 2022-04-18 RX ADMIN — SODIUM CHLORIDE 8 MG/HR: 9 INJECTION, SOLUTION INTRAVENOUS at 20:32

## 2022-04-18 RX ADMIN — ALBUMIN (HUMAN) 50 G: 0.25 INJECTION, SOLUTION INTRAVENOUS at 11:57

## 2022-04-18 RX ADMIN — ATORVASTATIN CALCIUM 20 MG: 20 TABLET, FILM COATED ORAL at 20:13

## 2022-04-18 RX ADMIN — SODIUM CHLORIDE 8 MG/HR: 9 INJECTION, SOLUTION INTRAVENOUS at 08:21

## 2022-04-18 RX ADMIN — SODIUM CHLORIDE, PRESERVATIVE FREE 10 ML: 5 INJECTION INTRAVENOUS at 20:15

## 2022-04-18 ASSESSMENT — ENCOUNTER SYMPTOMS
VOMITING: 0
CONSTIPATION: 0
ABDOMINAL PAIN: 0
BACK PAIN: 1
ABDOMINAL DISTENTION: 1
DIARRHEA: 0
NAUSEA: 0
SHORTNESS OF BREATH: 1

## 2022-04-18 ASSESSMENT — PAIN SCALES - GENERAL
PAINLEVEL_OUTOF10: 4
PAINLEVEL_OUTOF10: 0
PAINLEVEL_OUTOF10: 8
PAINLEVEL_OUTOF10: 10

## 2022-04-18 ASSESSMENT — PAIN DESCRIPTION - LOCATION
LOCATION: HEAD
LOCATION: HEAD

## 2022-04-18 ASSESSMENT — PAIN DESCRIPTION - PAIN TYPE: TYPE: ACUTE PAIN

## 2022-04-18 ASSESSMENT — PAIN DESCRIPTION - DESCRIPTORS: DESCRIPTORS: HEADACHE

## 2022-04-18 NOTE — PROGRESS NOTES
ICU Progress Note (Non-Vent)  [facility]    Patient - Richard Goff,  Age - 57 y.o.    - 1960      Room Number -    MRN -  [id number]   Acct # - [de-identified]  Date of Admission -  2022 10:10 AM    Events of Past 24 Hours   Pt c/o HA today and abd fullness. States he needs abd drained. Fullness is starting to cause some intermittent SOB. He has no other complaints. He is urinating and stooling well. He is tolerating clear liquid diet. Denies pain. Vitals    weight is 228 lb 6.3 oz (103.6 kg). His axillary temperature is 98.5 °F (36.9 °C). His blood pressure is 113/46 (abnormal) and his pulse is 70. His respiration is 16 and oxygen saturation is 96%.        Temperature Range: Temp: 98.5 °F (36.9 °C) Temp  Av.9 °F (36.6 °C)  Min: 96.8 °F (36 °C)  Max: 98.5 °F (36.9 °C)  BP Range:  Systolic (65UTV), BZP:178 , Min:95 , HWI:764     Diastolic (01XWQ), ZBC:36, Min:40, Max:86    Pulse Range: Pulse  Av.1  Min: 63  Max: 80  Respiration Range: Resp  Av  Min: 8  Max: 27  Current Pulse Ox[de-identified]  SpO2: 96 %  24HR Pulse Ox Range:  SpO2  Av.1 %  Min: 96 %  Max: 100 %  Oxygen Amount and Delivery: O2 Flow Rate (L/min): 6 L/min    Wt Readings from Last 3 Encounters:   22 228 lb 6.3 oz (103.6 kg)   22 210 lb (95.3 kg)   22 198 lb (89.8 kg)     I/O       Intake/Output Summary (Last 24 hours) at 2022 0801  Last data filed at 2022 1918  Gross per 24 hour   Intake 2878.23 ml   Output --   Net 2878.23 ml     DRAIN/TUBE OUTPUT       Invasive Lines   ICP PRESSURE RANGE  No data recorded  CVP PRESSURE RANGE  No data recorded      Medications      sodium chloride flush  5-40 mL IntraVENous 2 times per day    atorvastatin  20 mg Oral Nightly    [Held by provider] FLUoxetine  20 mg Oral Daily    sodium chloride flush  5-40 mL IntraVENous BID    rifaximin  550 mg Oral BID    lactulose  20 g Oral TID     sodium chloride, oxyCODONE, potassium chloride **OR** potassium alternative oral replacement **OR** potassium chloride, magnesium sulfate, sodium chloride, sodium chloride flush, sodium chloride, ondansetron **OR** ondansetron, polyethylene glycol, midodrine  IV Drips/Infusions   sodium chloride      pantoprazole 8 mg/hr (04/17/22 2205)    dextrose 5 % and 0.9 % NaCl 50 mL/hr at 04/17/22 2204    sodium chloride      sodium chloride         Diet/Nutrition   ADULT ORAL NUTRITION SUPPLEMENT; Breakfast, Lunch, Dinner; Clear Liquid Oral Supplement  ADULT DIET; Dysphagia - Soft and Bite Sized; Low Sodium (2 gm)    Exam      Constitutional - Alert, arousable  General Appearance  well developed, well nourished  HEENT -normocephalic, atraumatic. PERRLA  Lungs - Chest expands equally, no wheezes, rales or rhonchi. Cardiovascular - Heart sounds are normal.  normal rate and rhythm regular, no murmur, gallop or rub. Abdomen - soft, nontender, nondistended, no masses or organomegaly  Neurologic - CN II-XII are grossly intact.  There are no focal motor deficits  Skin - no bruising or bleeding  Extremities - no cyanosis, clubbing or edema    Lab Results   CBC     Lab Results   Component Value Date    WBC 7.5 04/18/2022    RBC 3.35 04/18/2022    RBC 4.75 04/19/2012    HGB 9.3 04/18/2022    HCT 29.1 04/18/2022    PLT 78 04/18/2022     04/19/2012    MCV 86.9 04/18/2022    MCH 27.8 04/18/2022    MCHC 32.0 04/18/2022    RDW 19.8 04/18/2022    NRBC 1 04/13/2022    LYMPHOPCT 3 04/16/2022    MONOPCT 11 04/16/2022    MYELOPCT 1 06/01/2021    BASOPCT 0 04/16/2022    MONOSABS 0.91 04/16/2022    LYMPHSABS 0.25 04/16/2022    EOSABS 0.08 04/16/2022    BASOSABS 0.00 04/16/2022    DIFFTYPE NOT REPORTED 02/02/2022       BMP   Lab Results   Component Value Date     04/18/2022    K 4.2 04/18/2022     04/18/2022    CO2 21 04/18/2022    BUN 4 04/18/2022    CREATININE <0.40 04/18/2022 GLUCOSE 132 04/18/2022    GLUCOSE 65 04/19/2012       LFTS  Lab Results   Component Value Date    ALKPHOS 90 04/15/2022    ALT 17 04/15/2022    AST 34 04/15/2022    PROT 4.5 04/15/2022    BILITOT 6.08 04/15/2022    BILIDIR 2.46 04/15/2022    IBILI 3.62 04/15/2022    LABALBU 2.9 04/15/2022    LABALBU 4.7 04/19/2012       ABG ABGs: No results found for: PHART, PO2ART, TGV7YGV    No results found for: IFIO2, MODE, SETTIDVOL, SETPEEP      INR  No results for input(s): PROTIME, INR in the last 72 hours. APTT  No results for input(s): APTT in the last 72 hours. Lactic Acid  Lab Results   Component Value Date    LACTA 2.0 04/13/2022    LACTA 1.0 07/21/2016    LACTA 2.8 07/20/2016        BNP   No results for input(s): BNP in the last 72 hours. Cultures       Radiology     CXR      CT Scans    (See actual reports for details)      SYSTEMS ASSESSMENT  Anemia due to blood loss  Alcoholic liver cirrhosis with portal hypertension  Decompensated liver failure  Hyponatremia and hypokalemia  History of tobacco use  Nonmorbid obesity  Possible ONOFRE    Neuro   No focal deficits    Respiratory   SOB secondary to abdominal fullness - expected until therapeutic paracentesis can be performed    Cardiovascular   Hemodynamically stable      Gastrointestinal   No gross bleeding noted on colonoscopy - pathology pending  Further requests for therapeutic paracentesis will need to be coordinated through GI and IR. Pt already received therapeutic paracentesis this admission.    Ascites fluid did not show signs of SBP  Diet to be advanced  Protonix drip     Renal   Hyponatremia at 129 - continue Na supplementation      Infectious Disease   WBC not elevated  Afebrile      Hematology/Oncology   H&H stable - continued to monitor   Transfuse if below 7 HgB  Platelets low at 78 - likely due to liver disease  Hypokalemia resolved    Endocrine       Social/Spiritual/DNR/Disposition/Other       Critical Care Time   0 min    Electronically signed by Ana Maria Roberts on 4/18/2022 at 8:01 AM        Patient seen and examined independently by me. Above discussed and I agree with medical student note except where indicated in the EMR revision history. Also see my additional comments and changes indicated by discrete font, text color, italics, and/or initials. Labs, cultures, and radiographs where available were reviewed. Apparently no beds which is why was not transferred to the stepdown area. We will order morphine as needed for headache he cannot take acetaminophen or any nonsteroidals due to his liver disease. Agree with plans for paracentesis  Agree with resuming his Aldactone  Will order 1 dose of Lasix IV while he is about 5 L ahead.   Electronically signed by Marcos Gil MD on 4/18/2022 at 11:19 AM

## 2022-04-18 NOTE — PROGRESS NOTES
[address]    Occupational Therapy Evaluation  Date: 22  Patient Name: Petty Casarez       Room:   MRN: [id number]  Account: [de-identified]   : 1960  (63 y.o.) Gender: male     Discharge Recommendations:  Further Occupational Therapy is recommended upon facility discharge. Equipment Needed:  (TBD)    Referring Practitioner: Hayes Huang MD  Diagnosis: Gastrointestinal hemorrhage with melena  Additional Pertinent Hx: 57 y.o. Non- / non  male with PMH of alcoholic liver disease/cirrhosis, history of hepatitis C (treated). He is a former smoker with 50+ pack year history, and admits to former heavy alcohol use. Denies illicit or IV drug use. Patient also has a history of GE junction adenocarcinoma s/p chemoradiation completed 21 and PET 21 with no recurrence, as well as most recent EGD on 22 which was negative for malignanacy. This EGD also showed severe portal hypertensive gastropathy with some oozing of blood visualized    Treatment Diagnosis: Impaired self-care status  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 ([address] Anxiety, Arthritis, Back pain, chronic, Lezama esophagus, BPH (benign prostatic hypertrophy), Cholelithiasis, Cirrhosis ([address] COVID-19, COVID-19 vaccine series completed, DDD (degenerative disc disease), lumbar, Depression, Esophageal cancer ([address] Esophageal varices ([address] Fatty liver, GERD (gastroesophageal reflux disease), Hep C w/o coma, chronic ([address] History of alcohol abuse, History of blood transfusion, History of colon polyps, History of tobacco abuse, [place] (hard of hearing), Hyperlipidemia, Hypertension, Port-A-Cath in place, Sciatica, Spinal stenosis, Stomach ulcer, Tubular adenoma of colon, Vitamin D deficiency, and Wears glasses. Past Surgical History:   has a past surgical history that includes Bunionectomy;  Nasal septum surgery; other surgical history (01/04/16); Colonoscopy; Colonoscopy (10/05/2016); other surgical history (11/21/2016); other surgical history (12/19/2016); knee surgery (Left); Bunionectomy (Left); Endoscopy, colon, diagnostic; pr revise median n/carpal tunnel surg (Right, 8/29/2017); Carpal tunnel release (Right); pr revise median n/carpal tunnel surg (Left, 10/31/2017); Colonoscopy (N/A, 3/30/2018); Colonoscopy (03/30/2018); pr njx aa&/strd tfrml epi lumbar/sacral 1 level (Bilateral, 9/6/2018); other surgical history (09/28/2018); pr njx aa&/strd tfrml epi lumbar/sacral 1 level (N/A, 9/28/2018); Pain management procedure (Left, 7/9/2020); Pain management procedure (Left, 7/20/2020); Pain management procedure (Bilateral, 8/17/2020); other surgical history (Right, 11/23/2020); Nerve Block (Right, 11/23/2020); Pain management procedure (Bilateral, 12/7/2020); Upper gastrointestinal endoscopy (N/A, 12/29/2020); Upper gastrointestinal endoscopy (N/A, 2/2/2021); Upper gastrointestinal endoscopy (N/A, 2/12/2021); Upper gastrointestinal endoscopy (2/12/2021); Atkins tooth extraction; IR PORT PLACEMENT > 5 YEARS (4/19/2021); Upper gastrointestinal endoscopy (N/A, 8/31/2021); Upper gastrointestinal endoscopy (N/A, 1/21/2022); Upper gastrointestinal endoscopy (N/A, 4/15/2022); and Colonoscopy (N/A, 4/16/2022). Restrictions  Restrictions/Precautions: General Precautions,Fall Risk,Up as Tolerated  Implants present? :  (denies)  Required Braces or Orthoses?: No     Vitals  Temp: 97.8 °F (36.6 °C)  Pulse: 80  Resp: 17  BP: (!) 116/53  Weight: 228 lb 6.3 oz (103.6 kg)  BMI (Calculated): 0  Oxygen Therapy  SpO2: 97 %  Pulse Oximeter Device Mode: Intermittent  Pulse Oximeter Device Location: Right  O2 Device: None (Room air)  Skin Assessment: Clean, dry, & intact  O2 Flow Rate (L/min): 6 L/min  Level of Consciousness: Alert (0)    Subjective  Subjective: \"My arms are so weak. I've lost so much muscle mass\"  Comments: Okay for OT will per RN Bogdan Goss. Pt is pleasant and agreeable for today's session. Vision  Vision: Impaired  Vision Exceptions: Wears glasses at all times  Hearing  Hearing: Exceptions to [facility]  Hearing Exceptions: Hard of hearing/hearing concerns,No hearing aid  Social/Functional History  Lives With: Alone  Type of Home: House  Home Layout: One level,Laundry in basement (Pt reports laundry on main floor, as well )  Home Access: Stairs to enter without rails  Entrance Stairs - Number of Steps: 2  Bathroom Shower/Tub: Tub/Shower unit,Curtain  Bathroom Toilet: Standard  Bathroom Equipment: Grab bars in shower,Tub transfer bench  Bathroom Accessibility: Not accessible  Home Equipment: Rolling walker  Receives Help From: Family  ADL Assistance: [facility] [address]: Independent  Homemaking Responsibilities: Yes  Ambulation Assistance: Independent (RW)  Transfer Assistance: Independent  Active : Yes  Mode of Transportation: Truck  Occupation: Retired  Type of occupation:   IADL Comments: sleeps on flat bed at home  Additional Comments: pt has family and friends who regularly visit; can recieve help prn       Objective      Cognition  Overall Cognitive Status: Impaired  Following Directions: Follows two step commands  Safety Judgement: Decreased awareness of need for safety  Awareness of Errors: Assistance required to identify errors made  Insights: Decreased awareness of deficits  Sequencing and Organization: Assistance required to identify errors made,Assistance required to generate solutions,Assistance required to implement solutions   Sensation  Overall Sensation Status: WFL (denies)   ADL  Feeding: Minimal assistance (A with opening container)  Grooming: Minimal assistance  UE Bathing: Minimal assistance  LE Bathing: Moderate assistance  UE Dressing: Minimal assistance  LE Dressing: Moderate assistance  Toileting:  Moderate assistance  Additional Comments: ADL scores based on skilled observation and clinical reasoning, unless otherwise noted. Pt able to adjust B socks seated EOB, flexing trunk forward. Assistance required due to weakness and poor safety awareness, impacting independence with self care    UE Function           LUE Strength  L Hand General: 4-/5  LUE Strength Comment: Deconditioned: overall 4-/5     LUE Tone: Normotonic     LUE AROM (degrees)  LUE AROM : WFL     Left Hand AROM (degrees)  Left Hand AROM: WFL  RUE Strength  R Hand General: 4-/5  RUE Strength Comment: Deconditioned: overall 4-/5      RUE Tone: Normotonic     RUE AROM (degrees)  RUE AROM : WFL     Right Hand AROM (degrees)  Right Hand AROM: WFL    Fine Motor Skills  Coordination  Movements Are Fluid And Coordinated: No  Coordination and Movement description: Fine motor impairments,Right UE,Left UE                           Mobility  Supine to Sit: Supervision  Sit to Supine: Supervision     Balance  Sitting Balance: Supervision  Standing Balance: Contact guard assistance  Standing Balance  Time: 1-2 minutes  Activity: functional transfers, functional mobility  Comment: with RW for UE support  Functional Mobility  Functional - Mobility Device: Rolling Walker  Activity:  (In patient's room (~12 feet))  Assist Level: Contact guard assistance  Functional Mobility Comments: Assist with line mgmt, poor safety awareness as pt did not monitor lines prior to ambulation. Bed mobility  Rolling to Left: Supervision  Supine to Sit: Supervision  Sit to Supine: Supervision  Scooting: Supervision  Comment: HOB slightly elevated with use of hand rails. Denied lightheadedness/dizziness     Transfers  Sit to stand: Stand by assistance  Stand to sit: Stand by assistance  Transfer Comments: Good hand placement this date  Functional Activity Tolerance  Functional Activity Tolerance:  Tolerates 10 - 20 min exercise with multiple rests     Assessment  Assessment  Performance deficits / Impairments: Decreased functional mobility ,Decreased ADL status,Decreased strength,Decreased safe awareness,Decreased endurance,Decreased balance,Decreased high-level IADLs  Treatment Diagnosis: Impaired self-care status  Prognosis: Good  Decision Making: Medium Complexity  REQUIRES OT FOLLOW UP: Yes  Discharge Recommendations: Patient would benefit from continued therapy after discharge  Activity Tolerance: Patient Tolerated treatment well         Functional Outcome Measures  AM-PAC Daily Activity Inpatient   How much help for putting on and taking off regular lower body clothing?: A Little  How much help for Bathing?: A Lot  How much help for Toileting?: A Lot  How much help for putting on and taking off regular upper body clothing?: A Lot  How much help for taking care of personal grooming?: A Little  How much help for eating meals?: A Little  AM-[facility] Inpatient Daily Activity Raw Score: 15  AM-PAC Inpatient ADL T-Scale Score : 34.69  ADL Inpatient CMS 0-100% Score: 56.46  ADL Inpatient CMS G-Code Modifier : CK       Goals  Patient Goals   Patient goals : To go home  Short term goals  Time Frame for Short term goals: By discharge  Short term goal 1: Pt will perform BADLs with supervision and Good safety  Short term goal 2: Pt will perform transfers/functional mobility with supervision and Good safety  Short term goal 3: Pt will participate in HEP, including UE exercises/hand strengthening, to increase strength/endurance for improved occupational performance  Short term goal 4: Pt will V/D at least three fall prevention/home safety techniques to increase safety in daily routine  Short term goal 5: Pt will actively participate in 15+ minutes of therapeutic exercise/functional activity to promote safety and indepenence with self care and mobility    Plan  Safety Devices  Safety Devices in place: Yes  Type of devices:  All fall risk precautions in place,Bed alarm in place,Call light within reach,Patient at risk for falls,Left in bed     Plan  Times per week: 4-5  Current Treatment Recommendations: Strengthening,Balance Training,Functional Mobility Training,Endurance Training,Safety Education & Training,Patient/Caregiver Education & Training,Equipment Evaluation, Education, & procurement,Self-Care / ADL       Equipment Recommendations  Equipment Needed:  (TBD)  OT Individual Minutes  Time In: 3070  Time Out: 0529  Minutes: 16    Electronically signed by Ronney Goodpasture, OT on 4/18/22 at 3:51 PM EDT         04/18/22 1550   OT Individual Minutes   Time In 2707   Time Out 9686   [facility]

## 2022-04-18 NOTE — PROGRESS NOTES
Physical Therapy      Patient Name: Abbey Sanchez  Date of admission:      4/13/2022  Patient's age:  57 y.o., 1960  Admission Dx: Severe anemia [D64.9]  Anemia, unspecified type [D64.9]  Past Medical History:   Diagnosis Date    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 Deep Blackmon, orthopedic, every 3-4 months, gets steroid injection    Lezama esophagus     BPH (benign prostatic hypertrophy)     Cholelithiasis     Cirrhosis ([address]     COVID-19 12/2020    pt reports he had a positive test while at [facility] in 2020, was asymptomatic    COVID-19 vaccine series completed 5/20/2021, 6/22/2021    Moderna 5/20/2021, 6/22/2021    DDD (degenerative disc disease), lumbar     Depression     Esophageal cancer ([address]     INVASIVE ADENOCARCINOMA ARISING IN TUBULAR ADENOMA WITH HIGH GRADE DYSPLASIA, ASSOCIATED WITH FOCAL INTESTINAL METAPLASIA     Esophageal varices ([address]     Fatty liver     GERD (gastroesophageal reflux disease)     Hep C w/o coma, chronic ([address]     History of alcohol abuse     6-12 beers a day; quit drinking July 2016    History of blood transfusion     History of colon polyps 2016    History of tobacco abuse     [place] (hard of hearing)     Hyperlipidemia     Hypertension     Port-A-Cath in place     right upper chest    Sciatica     Spinal stenosis     Stomach ulcer     hx of    Tubular adenoma of colon 2016, 2018    Vitamin D deficiency     Wears glasses      Past Surgical History:   Procedure Laterality Date    BUNIONECTOMY      twice on right side    BUNIONECTOMY Left     CARPAL TUNNEL RELEASE Right     COLONOSCOPY      at age 35    COLONOSCOPY  10/05/2016    polyps-pathology tubular adenoma, and abnormal looking mucosa right colon-pathology-tubular adenoma    COLONOSCOPY N/A 3/30/2018    COLONOSCOPY POLYPECTOMY COLD BIOPSY performed by Laurita Kendall MD at [address]  03/30/2018    Small polyp in the sigmoid colon and excised with biopsy forceps--tubular adenoma    COLONOSCOPY N/A 4/16/2022    COLONOSCOPY POLYPECTOMY performed by Emiliano Hook MD at [address] [place] [address] [place], COLON, DIAGNOSTIC      EGD    IR PORT PLACEMENT EQUAL OR GREATER THAN 5 YEARS  4/19/2021    IR PORT PLACEMENT EQUAL OR GREATER THAN 5 YEARS 4/19/2021 [facility] SPECIAL PROCED[facility]    KNEE SURGERY Left     cyst removed    NASAL SEPTUM SURGERY      NERVE BLOCK Right 11/23/2020    NERVE BLOCK RIGHT CERVICAL STEROID INJECTION  C3-C6 performed by Konstantin Pulido MD at [address] [place] [address],[facility]  01/04/16    steroid injection C7 T1    OTHER SURGICAL HISTORY  11/21/2016    Bilateral Lumbar CACHORRO L4-L5 injections    OTHER SURGICAL HISTORY  12/19/2016    lumbar steroid injection    OTHER SURGICAL HISTORY  09/28/2018    BILATERAL L5 CACHORRO (N/A Back)    OTHER SURGICAL HISTORY Right 11/23/2020    cervical injection    PAIN MANAGEMENT PROCEDURE Left 7/9/2020    EPIDURAL STEROID INJECTION LEFT L4 L5 performed by Konstantin Pulido MD at [place] [address] Left 7/20/2020    LEFT L4 L5 EPIDURAL STEROID INJECTION performed by Konstantin Pulido MD at [place] [address] Bilateral 8/17/2020    LUMBAR FACET BILATERAL L2-L5 performed by Konstantin Pulido MD at [place] [address] Bilateral 12/7/2020    [facility] L2-L5 performed by Konstantin Pulido MD at [place] AA&/STRD TFRML EPI LUMBAR/SACRAL 1 LEVEL Bilateral 9/6/2018    BILATERAL L5 CACHORRO performed by Konstantin Pulido MD at [place] AA[facility]/STRD TFRML EPI LUMBAR/SACRAL 1 LEVEL N/A 9/28/2018    BILATERAL L5 CACHORRO performed by Konstantin Pulido MD at [facility] [place] N/CARPAL [address] Right 8/29/2017    CARPAL TUNNEL RELEASE RIGHT performed by Booker Valdez MD at [address] N/CARPAL TUNNEL SURG Left 10/31/2017    CARPAL TUNNEL RELEASE performed by Booker Valdez MD at [address] [place] [address] UPPER GASTROINTESTINAL ENDOSCOPY N/A 12/29/2020    EGD BIOPSY performed by Maya Hoffmann MD at [place] [address] N/A 2/2/2021    EGD BIOPSY and spot marking performed by Mana Adame MD at [place] [address] N/A 2/12/2021    ENDOSCOPIC ULTRASOUND, EGD performed by Nicola Goel MD at [address] [place] [address]  2/12/2021    EGD DIAGNOSTIC ONLY performed by Nicola Goel MD at [address] [place] [address] N/A 8/31/2021    EGD BIOPSY performed by Mana Adame MD at [place] [address] 1/21/2022    EGD BIOPSY performed by Mana Adame MD at [place] [address] N/A 4/15/2022    EGD ESOPHAGOGASTRODUODENOSCOPY performed by Maya Hoffmann MD at [address]            04/15/22 1541   Restrictions/Precautions   Restrictions/Precautions General Precautions; Fall Risk;Up as Tolerated   Required Braces or Orthoses? No   Implants present?    (none)   Vision   Vision Impaired   Vision Exceptions Wears glasses at all times   Hearing   Hearing X   Hearing Exceptions Hard of hearing/hearing concerns   General   Chart Reviewed Yes   Patient assessed for rehabilitation services? Yes   Additional Pertinent Hx Pt is a 57 y.o. male with H/O hepatic Cirrhosis, anemia, lumbar stenosis, and cancer presents to [place] from pain management clinic where he was scheduled to have an epidural steroid injection for back pain due to spinal stenosis. However, he was unable to lie prone due to abdominal distention and shortness of breath and he was sent to the emergency department from pain management clinic where he was scheduled to have an epidural steroid injection for back pain due to spinal stenosis. most recent EGD on 1/21/22 which was negative for malignanacy.   This EGD also showed severe portal hypertensive gastropathy with some oozing of blood visualized. Response To Previous Treatment Not applicable   Family / Caregiver Present No   Referring Practitioner Dr. Zeeshan Pulido   Referral Date  04/13/22   Diagnosis GI Hemorrhage; severe anemia    Follows Commands WFL   General Comment   Comments Ok per nurse Emmanuel Rajan to proceed with PT Eval   Subjective   Subjective Pt is agreeable to assessments and states that his pain isn't at its worst as of current   Pain Screening   Patient Currently in Pain Yes   Pain Assessment   Pain Assessment 0-10   Pain Level 3   Patient's Stated Pain Goal No pain   Pain Type Chronic pain   Pain Location Back   Pain Orientation Lower   Orientation   Overall Orientation Status WFL   Oxygen Therapy   SpO2 98 %   Pulse Oximeter Device Mode Continuous   Pulse Oximeter Device Location Finger   O2 Device None (Room air)   Patient Observation   Observations pt seated on EOB with Nurse Emmanuel Rajan at beginning of session and retires to rest in bed at end of session; SOB noted when lying in bed 2* inc pressure on chest cavity from distended abdomen. Continuous vitals monitoring and peripheral IV Left forearm   Social/Functional History   Lives With Alone   Type of [address] [place] [address] One level   Home Access Stairs to enter without rails   Entrance Stairs - Number of Steps 3-4   Bathroom Shower/Tub Tub/Shower unit   Bathroom Toilet Standard   Bathroom Equipment Grab bars in shower; Tub transfer bench   Bathroom Accessibility Accessible  (no Walker accessible; RW stays outside [facility])   Renzo 18 walker   Receives Help From [address] Responsibilities Yes   Ambulation Assistance Independent  (RW)   Transfer Assistance Independent  ([address] [place] [address])   Active  Yes   Occupation Retired   Type of occupation    IADL Comments sleeps on flat bed at home   Additional Comments pt has family and friends who regularly visit; can recieve help prn AROM RLE (degrees)   RLE AROM WFL   AROM LLE (degrees)   LLE AROM  WFL   AROM RUE (degrees)   RUE AROM    (See OT assessments)   AROM LUE (degrees)   LUE AROM    (See OT assessments)   Strength RLE   Strength RLE WFL   Strength LLE   Strength LLE WFL   Strength RUE   Strength RUE   (See OT assessments)   Strength LUE   Strength LUE   (See OT assessments)   Sensation   Overall Sensation Status Impaired  (occasional numbness in Bilat legs; not present currently.)   Bed Mobility   Rolling Supervision   Supine to Sit Supervision   Sit to Supine Supervision   Scooting Supervision   Comment Supervision provided for safety, HOB flattened for all bed mobility; pt has difficulty tolerating lying flat d/t abdominal distension and inc pressure onto chest cavity. Transfers   Sit to Stand Stand by assistance   Stand to sit Stand by assistance   Bed to Chair Contact guard assistance   Stand Pivot Transfers Contact guard assistance   Comment RW for all transfers; cues for safe hands placement with limited carry over,   Ambulation   Ambulation? Yes   Ambulation 1   Surface level tile   Device Rolling Walker   Assistance Contact guard assistance;Stand by assistance  (IV pole managed by this writer)   Quality of Gait Narrow step width, Shoulders elevated with forward trunk lean   Gait Deviations Decreased step height  (narrow step width)   Distance 15'x3, 10' retro walking, 65'x2    Comments RW for all ambulation; cues for posture with fair carry over; Education on good posture to reduce pain and stiffness during activity. Stairs/Curb   Stairs? No   Balance   Posture Fair  (fwd head, elevated shoulders, rounded back)   Sitting - Static Good   Sitting - Dynamic Good   Standing - Static Good   Standing - Dynamic Fair   Comments Standing balance assessed with RW   Other exercises   Other exercises?  Yes   Other exercises 1 Postural Correct in Staticstanding and during dynamic movements with education   Patient Goals    Patient goals  to return home    Short term goals   Time Frame for Short term goals 5 days   Short term goal 1 pt to demo independent bed mobility   Short term goal 2 pt to demo all transfers Mod I with RW demonstrating safe hands placement   Short term goal 3 pt to correct and maintain improved posture during static and dynamic standing   Short term goal 4 pt to negotiate 3-4 steps without railing to allow for safe access to home   Conditions Requiring Skilled Therapeutic Intervention   Body structures, Functions, Activity limitations Decreased functional mobility ; Decreased balance; Increased pain;Decreased posture   Assessment pt requires supervision for safety with bed mobility and CGA-SBA with transfers and ambulation using the RW; pt is most limited by inc pain but does report that walking and movement improve his back pain this date; pt demonstrates decreased posture which causes concerns for impaired balance and may be responsible for his increased pain. Pt would benefit from continued PT services to address deficits in pain and posture which overall limit safe functional mobility and tolerance for activity. Treatment Diagnosis Inc pain and decreased posture   Prognosis Guarded   Decision Making Medium Complexity   Exam ROM, MMT, Balance, and functional mobility assessments   Clinical Presentation pt is agreeable and pleasant; Activity/ambulation seem to benefit pain levels this date.    REQUIRES PT FOLLOW UP Yes   Treatment Initiated  Bed mobility, safe transfer training, ambulation    Discharge Recommendations Patient would benefit from continued therapy after discharge  (OP PT for pain management and postural improvement)   Timed Code Treatment Minutes 35 Minutes   Activity Tolerance   Activity Tolerance Patient Tolerated treatment well   PT Equipment Recommendations   Equipment Needed No   Plan   Times per week 4-5x per week   Specific instructions for Next Treatment Progress to stair negotiation 3-4 steps; reinforce postural correction statically/dynamically   Current Treatment Recommendations Balance Training;Functional Mobility Training;Transfer Training;Gait Training;Stair training;Pain Management;Home Exercise Program;Patient/Caregiver Education & Training;Positioning   Safety Devices   Type of devices All fall risk precautions in place;Gait belt;Patient at risk for falls; Left in bed;Nurse notified  (Nurse Allison Niño)   PT Whiteboard Notes   Therapy Whiteboard 4/15/22 ** Home with A prn; Supervision bed mobility; CGA-SBA transfers and gait with RW; multiple amb distances; pain seems to improve with movement; fall risk     Electronically signed by Katja Anderson, PT on 4/18/2022 at 7:34 AM

## 2022-04-18 NOTE — PROGRESS NOTES
Today's Date: 4/18/2022  Patient Name: Dayanara Lucas  Date of admission: 4/13/2022 10:10 AM  Patient's age: 57 y.o., 1960  Admission Dx: Severe anemia [D64.9]  Anemia, unspecified type [D64.9]    Requesting Physician: Alexa Barboza MD    CHIEF COMPLAINT: Excessive weakness and fatigue. Severe anemia. Esophageal cancer. SUBJECTIVE:  Patient was seen and examined. Hb stable  Plts are stable   No active bleeding  ++fatigue      BRIEF CASE HISTORY:    The patient is a 57 y.o.  male who is admitted to the hospital for further management of severe anemia. Patient presents with extreme weakness and fatigue. Symptoms started about 3 to 4 days ago. He has no dizziness. He has shortness of breath on minimal exertion. No abdominal pain. No chest pain. No difficulty swallowing. No nausea or vomiting. Patient denies any melena or hematochezia. No hematemesis. The patient is known to our practice. 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 Patient received chemoradiation with very good results. Patient's labs showed severe anemia with hemoglobin of 3.6. He was hospitalized with received blood transfusion. He is slightly better. Patient has no other complaints with treatment plan. No fever or signs of infection. No respiratory distress.     Past Medical History:   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 ([address] Anxiety, Arthritis, Back pain, chronic, Lezama esophagus, BPH (benign prostatic hypertrophy), Cholelithiasis, Cirrhosis ([address] COVID-19, COVID-19 vaccine series completed, DDD (degenerative disc disease), lumbar, Depression, Esophageal cancer ([address] Esophageal varices ([address] Fatty liver, GERD (gastroesophageal reflux disease), Hep C w/o coma, chronic ([address] History of alcohol abuse, History of blood transfusion, History of colon polyps, History of tobacco abuse, [place] (hard of hearing), Hyperlipidemia, Hypertension, Port-A-Cath in place, Sciatica, Spinal stenosis, Stomach ulcer, Tubular adenoma of colon, Vitamin D deficiency, and Wears glasses. Past Surgical History:   has a past surgical history that includes Bunionectomy; Nasal septum surgery; other surgical history (01/04/16); Colonoscopy; Colonoscopy (10/05/2016); other surgical history (11/21/2016); other surgical history (12/19/2016); knee surgery (Left); Bunionectomy (Left); Endoscopy, colon, diagnostic; pr revise median n/carpal tunnel surg (Right, 8/29/2017); Carpal tunnel release (Right); pr revise median n/carpal tunnel surg (Left, 10/31/2017); Colonoscopy (N/A, 3/30/2018); Colonoscopy (03/30/2018); pr njx aa&/strd tfrml epi lumbar/sacral 1 level (Bilateral, 9/6/2018); other surgical history (09/28/2018); pr njx aa&/strd tfrml epi lumbar/sacral 1 level (N/A, 9/28/2018); Pain management procedure (Left, 7/9/2020); Pain management procedure (Left, 7/20/2020); Pain management procedure (Bilateral, 8/17/2020); other surgical history (Right, 11/23/2020); Nerve Block (Right, 11/23/2020); Pain management procedure (Bilateral, 12/7/2020); Upper gastrointestinal endoscopy (N/A, 12/29/2020); Upper gastrointestinal endoscopy (N/A, 2/2/2021); Upper gastrointestinal endoscopy (N/A, 2/12/2021); Upper gastrointestinal endoscopy (2/12/2021); [place] tooth extraction; IR PORT PLACEMENT > 5 YEARS (4/19/2021); Upper gastrointestinal endoscopy (N/A, 8/31/2021); Upper gastrointestinal endoscopy (N/A, 1/21/2022); Upper gastrointestinal endoscopy (N/A, 4/15/2022); and Colonoscopy (N/A, 4/16/2022). Family History: family history includes Asthma in his brother; Cancer in his father and mother; Diabetes in his sister. Social History:   reports that he quit smoking about 5 years ago. He has a 45.00 pack-year smoking history. He has never used smokeless tobacco. He reports previous alcohol use. He reports previous drug use. Frequency: 1.00 time per week.    Medications: Continuous Prasanna Singh MD 10 mL/hr at 04/18/22 0821 8 mg/hr at 04/18/22 0821    oxyCODONE (ROXICODONE) immediate release tablet 5 mg  5 mg Oral Q6H PRN Hayes Huang MD   5 mg at 04/18/22 0238    potassium chloride (KLOR-CON M) extended release tablet 40 mEq  40 mEq Oral PRN Prasanna Singh MD   40 mEq at 04/16/22 1445    Or    potassium bicarb-citric acid (EFFER-K) effervescent tablet 40 mEq  40 mEq Oral PRN Prasanna Singh MD        Or    potassium chloride 10 mEq/100 mL IVPB (Peripheral Line)  10 mEq IntraVENous PRN Prasanna Singh MD        magnesium sulfate 1000 mg in dextrose 5% 100 mL IVPB  1,000 mg IntraVENous PRN Prasanna Singh MD        0.9 % sodium chloride infusion   IntraVENous PRN Prasanna Singh MD        sodium chloride flush 0.9 % injection 5-40 mL  5-40 mL IntraVENous 2 times per day Prasanna Singh MD   10 mL at 04/18/22 0900    sodium chloride flush 0.9 % injection 5-40 mL  5-40 mL IntraVENous PRN Augusto Cordova MD        0.9 % sodium chloride infusion  25 mL IntraVENous PRN Prasanna Singh MD        ondansetron (ZOFRAN-ODT) disintegrating tablet 4 mg  4 mg Oral Q8H PRN Augusto Cordova MD        Or    ondansetron (ZOFRAN) injection 4 mg  4 mg IntraVENous Q6H PRN Prasanna Singh MD   4 mg at 04/14/22 0117    polyethylene glycol (GLYCOLAX) packet 17 g  17 g Oral Daily PRN Prasanna Singh MD        midodrine (PROAMATINE) tablet 5 mg  5 mg Oral TID PRN Prasanna Singh MD        atorvastatin (LIPITOR) tablet 20 mg  20 mg Oral Nightly Prasanna Singh MD   20 mg at 04/17/22 7438    [Held by provider] FLUoxetine (PROZAC) capsule 20 mg  20 mg Oral Daily Lucian Harley MD   20 mg at 04/15/22 0753    sodium chloride flush 0.9 % injection 5-40 mL  5-40 mL IntraVENous BID Laurie Cordova MD   10 mL at 04/18/22 0900    rifaximin (XIFAXAN) tablet 550 mg  550 mg Oral BID Prasanna Singh MD   550 mg at 04/18/22 6299    lactulose (CHRONULAC) 10 GM/15ML solution 20 g  20 g Oral TID Isaiah Elliott MD   20 g at 22 1304       Allergies:  Bee pollen and Pollen extract    REVIEW OF SYSTEMS:      · General: Positive for weakness and fatigue. No unanticipated weight loss or decreased appetite. No fever or chills. · Eyes: No blurred vision, eye pain or double vision. · Ears: No hearing problems or drainage. No tinnitus. · Throat: No sore throat, problems with swallowing or dysphagia. · Respiratory: No cough, sputum or hemoptysis. Positive for exertional shortness of breath. No pleuritic chest pain. · Cardiovascular: No chest pain, orthopnea or PND. No lower extremity edema. No palpitation. · Gastrointestinal: As above. · Genitourinary: No dysuria, hematuria, frequency or urgency. · Musculoskeletal: No muscle aches or pains. No limitation of movement. No back pain. No gait disturbance, No joint complaints. · Dermatologic: No skin rashes or pruritus. No skin lesions or discolorations. · Psychiatric: No depression, anxiety, or stress or signs of schizophrenia. No change in mood or affect. · Hematologic: No history of bleeding tendency. No bruises or ecchymosis. No history of clotting problems. · Infectious disease: No fever, chills or frequent infections. · Endocrine: No polydipsia or polyuria. No temperature intolerance. · Neurologic: No headaches or dizziness. No weakness or numbness of the extremities. No changes in balance, coordination,  memory, mentation, behavior. · Allergic/Immunologic: No nasal congestion or hives. No repeated infections. PHYSICAL EXAM:      /88   Pulse 71   Temp 97.8 °F (36.6 °C) (Oral)   Resp 14   Wt 228 lb 6.3 oz (103.6 kg)   SpO2 97%   BMI 32.77 kg/m²    Temp (24hrs), Av.1 °F (36.7 °C), Min:97.7 °F (36.5 °C), Max:98.5 °F (36.9 °C)      General appearance - not in pain or distress. Looks pale.   Mental status - alert and oriented  Eyes - pupils equal and reactive, extraocular eye movements intact  Ears - bilateral TM's and external ear canals normal  Nose - normal and patent, no erythema, discharge or polyps  Mouth - mucous membranes moist, pharynx normal without lesions  Neck - supple, no significant adenopathy  Lymphatics - no palpable lymphadenopathy, no hepatosplenomegaly  Chest - clear to auscultation, no wheezes, rales or rhonchi, symmetric air entry  Heart - normal rate, regular rhythm, normal S1, S2, no murmurs, rubs, clicks or gallops  Abdomen - soft, nontender, nondistended, no masses or organomegaly  Neurological - alert, oriented, normal speech, no focal findings or movement disorder noted  Musculoskeletal - no joint tenderness, deformity or swelling  Extremities - peripheral pulses normal, no pedal edema, no clubbing or cyanosis  Skin - normal coloration and turgor, no rashes, no suspicious skin lesions noted           DATA:      Labs:       CBC:   Recent Labs     04/17/22  0430 04/18/22  0410   WBC 7.4 7.5   HGB 8.9* 9.3*   HCT 28.0* 29.1*   PLT 77* 78*     BMP:   Recent Labs     04/17/22  0430 04/17/22  0430 04/17/22  1636 04/18/22  0410   *   < > 130* 129*   K 4.3  --   --  4.2   CO2 23  --   --  21   BUN 7*  --   --  4*   CREATININE 0.45*  --   --  <0.40*   LABGLOM >60  --   --  Can not be calculated   GLUCOSE 128*  --   --  132*    < > = values in this interval not displayed. PT/INR:   No results for input(s): PROTIME, INR in the last 72 hours. APTT:  No results for input(s): APTT in the last 72 hours. LIVER PROFILE:  No results for input(s): AST, ALT, LABALBU in the last 72 hours.   IR US GUIDED PARACENTESIS  Narrative: PROCEDURE:  PARACENTESIS WITH IMAGE GUIDANCE    US ABDOMEN LIMITED    4/18/2022    HISTORY:  ORDERING SYSTEM PROVIDED HISTORY: worsening acites and SOB  TECHNOLOGIST PROVIDED HISTORY:  worsening acites and SOB    Please remove no more than 5 L    TECHNIQUE:  Informed consent was obtained after a detailed explanation of the procedure  including risks, benefits, and alternatives. Universal protocol was  followed. A limited ultrasound of the abdomen was performed and shows large  amount of ascites. The right abdomen was prepped and draped in sterile  fashion and local anesthesia was achieved with lidocaine. A 5 Persian needle  sheath was advanced into ascites using realtime ultrasound guidance and  paracentesis was performed. The patient tolerated the procedure well. EBL: None    FINDINGS:  Limited ultrasound of the abdomen demonstrates ascites. A total of 4.8 L of  clear yellow fluid was removed. Impression: Successful ultrasound guided paracentesis. IMPRESSION:    Primary Problem  Gastrointestinal hemorrhage with melena    Active Hospital Problems    Diagnosis Date Noted    Anemia [D64.9] 04/13/2022    Acute kidney injury ([facility] [N17.9] 04/13/2022    Esophageal adenocarcinoma ([address] [C15.9]     Former smoker, 50+ pack years, quit 2016 [Z87.891] 12/20/2021    Spinal stenosis of lumbar region with neurogenic claudication [M48.062] 12/14/2021    Malignant neoplasm of lower third of esophagus ([address] [C15.5]     Gastrointestinal hemorrhage with melena [K92.1]     Hepatitis C virus infection resolved after antiviral drug therapy [Z86.19] 12/23/2020    Hepatic cirrhosis ([address] [K74.60] 09/15/2016    GERD (gastroesophageal reflux disease) [K21.9] 04/19/2012       RECOMMENDATIONS:  1. Records and labs and images were reviewed and discussed with the patient. 2. Patient had esophageal cancer of the lower third of the esophagus. Status post chemoradiation with good results. 3. Current presentation with severe anemia status post transfusion and iron infusion  4. Appreciate GI input. 5.  We will continue to transfuse to maintain hemoglobin above 7   6. I will continue  IV iron infusion. 7. We will follow      Discussed with patient and Nurse. Garrison Carlos MD  Hematologist/Medical Oncologist    Cell: [phone]            This note is created with the assistance of a speech recognition program.  While intending to generate a document that actually reflects the content of the visit, the document can still have some errors including those of syntax and sound a like substitutions which may escape proof reading. It such instances, actual meaning can be extrapolated by contextual diversion.

## 2022-04-18 NOTE — PROGRESS NOTES
GI Progress notes    4/18/2022   2:30 PM    Name:  Juan Pablo Brandon  MRN:    [id number]     [place]:     [de-identified]   Room:  2007/2007-01  IP Day: 5     Admit Date: 4/13/2022 10:10 AM  PCP: VASU Mccartney    Subjective:     C/C:   Chief Complaint   Patient presents with    Shortness of Breath       Interval History: Status: improved. Patient seen and examined. No acute events overnight  Had paracentesis today with 4.8 L removed  Tolerating diet  No overt GI bleeding  Hgb stable      ROS:  Constitutional: negative for chills, fevers and sweats  Gastrointestinal: negative for abdominal pain, constipation, diarrhea, nausea and vomiting  Neurological: negative for dizziness and headaches    Medications: Allergies:    Allergies   Allergen Reactions    Bee Pollen Other (See Comments)     Runny nose, watery eyes    Pollen Extract      Runny nose, watery eyes       Current Meds: spironolactone (ALDACTONE) tablet 50 mg, Daily  nadolol (CORGARD) tablet 20 mg, Daily  furosemide (LASIX) injection 40 mg, Once  morphine (PF) injection 2 mg, Q4H PRN  0.9 % sodium chloride infusion, PRN  pantoprazole (PROTONIX) 80 mg in sodium chloride 0.9 % 100 mL infusion, Continuous  oxyCODONE (ROXICODONE) immediate release tablet 5 mg, Q6H PRN  potassium chloride (KLOR-CON M) extended release tablet 40 mEq, PRN   Or  potassium bicarb-citric acid (EFFER-K) effervescent tablet 40 mEq, PRN   Or  potassium chloride 10 mEq/100 mL IVPB (Peripheral Line), PRN  magnesium sulfate 1000 mg in dextrose 5% 100 mL IVPB, PRN  0.9 % sodium chloride infusion, PRN  sodium chloride flush 0.9 % injection 5-40 mL, 2 times per day  sodium chloride flush 0.9 % injection 5-40 mL, PRN  0.9 % sodium chloride infusion, PRN  ondansetron (ZOFRAN-ODT) disintegrating tablet 4 mg, Q8H PRN   Or  ondansetron (ZOFRAN) injection 4 mg, Q6H PRN  polyethylene glycol (GLYCOLAX) packet 17 g, Daily PRN  midodrine (PROAMATINE) tablet 5 mg, TID PRN  atorvastatin (LIPITOR) tablet 20 mg, Nightly  [Held by provider] FLUoxetine (PROZAC) capsule 20 mg, Daily  sodium chloride flush 0.9 % injection 5-40 mL, BID  rifaximin (XIFAXAN) tablet 550 mg, BID  lactulose (CHRONULAC) 10 GM/15ML solution 20 g, TID        Data:     Code Status:  Full Code    Family History   Problem Relation Age of Onset    Cancer Mother         pancreatic    Cancer Father         bone    Diabetes Sister     Asthma Brother        Social History     Socioeconomic History    Marital status: Single     Spouse name: Not on file    Number of children: Not on file    Years of education: Not on file    Highest education level: Not on file   Occupational History    Not on file   Tobacco Use    Smoking status: Former Smoker     Packs/day: 1.00     Years: 45.00     Pack years: 45.00     Quit date: 2017     Years since quittin.2    Smokeless tobacco: Never Used   Vaping Use    Vaping Use: Never used   Substance and Sexual Activity    Alcohol use: Not Currently     Comment: quit     Drug use: Not Currently     Frequency: 1.0 times per week     Comment: cocaine,  stopped spring 2016    Sexual activity: Yes     Partners: Female   Other Topics Concern    Not on file   Social History Narrative     in the past, retired     Social Determinants of Health     Financial Resource Strain:     Difficulty of Paying Living Expenses: Not on file   Food Insecurity:     Worried About [address] in the Last Year: Not on file    [address] in the Last Year: Not on file   Transportation Needs:     Lack of Transportation (Medical): Not on file    Lack of Transportation (Non-Medical):  Not on file   Physical Activity:     Days of Exercise per Week: Not on file    Minutes of Exercise per Session: Not on file   Stress:     Feeling of Stress : Not on file   Social Connections:     Frequency of Communication with Friends and Family: Not on file    Frequency of Social Gatherings with Friends and Family: Not on file    Attends Baptism Services: Not on file    Active Member of Clubs or Organizations: Not on file    Attends Club or Organization Meetings: Not on file    Marital Status: Not on file   Intimate Partner Violence:     Fear of Current or Ex-Partner: Not on file    Emotionally Abused: Not on file    Physically Abused: Not on file    Sexually Abused: Not on file   Housing Stability:     Unable to Pay for Housing in the Last Year: Not on file    Number of [place] in the Last Year: Not on file    Unstable Housing in the Last Year: Not on file       Vitals:  BP (!) 116/53   Pulse 80   Temp 97.8 °F (36.6 °C) (Oral)   Resp 17   Wt 228 lb 6.3 oz (103.6 kg)   SpO2 97%   BMI 32.77 kg/m²   Temp (24hrs), Av.1 °F (36.7 °C), Min:97.7 °F (36.5 °C), Max:98.5 °F (36.9 °C)    No results for input(s): POCGLU in the last 72 hours. I/O (24Hr):     Intake/Output Summary (Last 24 hours) at 2022 1430  Last data filed at 2022 1300  Gross per 24 hour   Intake 2048.23 ml   Output 4800 ml   Net -2751.77 ml       Labs:      CBC:   Lab Results   Component Value Date    WBC 7.5 2022    RBC 3.35 2022    RBC 4.75 2012    HGB 9.3 2022    HCT 29.1 2022    MCV 86.9 2022    MCH 27.8 2022    MCHC 32.0 2022    RDW 19.8 2022    PLT 78 2022     2012    MPV 6.9 2022     CBC with Differential:    Lab Results   Component Value Date    WBC 7.5 2022    RBC 3.35 2022    RBC 4.75 2012    HGB 9.3 2022    HCT 29.1 2022    PLT 78 2022     2012    MCV 86.9 2022    MCH 27.8 2022    MCHC 32.0 2022    RDW 19.8 2022    NRBC 1 2022    LYMPHOPCT 3 2022    MONOPCT 11 2022    MYELOPCT 1 2021    BASOPCT 0 2022    MONOSABS 0.91 2022    LYMPHSABS 0.25 2022    EOSABS 0.08 2022    BASOSABS 0.00 2022    DIFFTYPE NOT REPORTED 02/02/2022     Hemoglobin/Hematocrit:    Lab Results   Component Value Date    HGB 9.3 04/18/2022    HCT 29.1 04/18/2022     CMP:    Lab Results   Component Value Date     04/18/2022    K 4.2 04/18/2022     04/18/2022    CO2 21 04/18/2022    BUN 4 04/18/2022    CREATININE <0.40 04/18/2022    GFRAA Can not be calculated 04/18/2022    LABGLOM Can not be calculated 04/18/2022    GLUCOSE 132 04/18/2022    GLUCOSE 65 04/19/2012    PROT 4.5 04/15/2022    LABALBU 2.9 04/15/2022    LABALBU 4.7 04/19/2012    CALCIUM 7.6 04/18/2022    BILITOT 6.08 04/15/2022    ALKPHOS 90 04/15/2022    AST 34 04/15/2022    ALT 17 04/15/2022     BMP:    Lab Results   Component Value Date     04/18/2022    K 4.2 04/18/2022     04/18/2022    CO2 21 04/18/2022    BUN 4 04/18/2022    LABALBU 2.9 04/15/2022    LABALBU 4.7 04/19/2012    CREATININE <0.40 04/18/2022    CALCIUM 7.6 04/18/2022    GFRAA Can not be calculated 04/18/2022    LABGLOM Can not be calculated 04/18/2022    GLUCOSE 132 04/18/2022    GLUCOSE 65 04/19/2012     PT/INR:    Lab Results   Component Value Date    PROTIME 18.3 04/13/2022    INR 1.5 04/13/2022     PTT:    Lab Results   Component Value Date    APTT 34.8 04/13/2022   [APTT}    Physical Examination:        General appearance: alert, cooperative and no distress  Mental Status: oriented to person, place and time and normal affect  Abdomen: soft, nontender, distended, bowel sounds present  Extremities: no edema, redness or tenderness in the calves  Skin: no gross lesions, rashes, or induration    Assessment:        Primary Problem  Gastrointestinal hemorrhage with melena     Active Hospital Problems    Diagnosis Date Noted    Anemia [D64.9] 04/13/2022    Acute kidney injury ([address] [N17.9] 04/13/2022    Esophageal adenocarcinoma ([facility] [C15.9]     Former smoker, 50+ pack years, quit 2016 [Z87.891] 12/20/2021    Spinal stenosis of lumbar region with neurogenic claudication [M48.062] 12/14/2021  Malignant neoplasm of lower third of esophagus ([facility] [C15.5]     Gastrointestinal hemorrhage with melena [K92.1]     Hepatitis C virus infection resolved after antiviral drug therapy [Z86.19] 12/23/2020    Hepatic cirrhosis ([facility] [K74.60] 09/15/2016    GERD (gastroesophageal reflux disease) [K21.9] 04/19/2012     Past Medical History:   Diagnosis Date    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 ([facility]     Anxiety     Arthritis     Back pain, chronic     dr. Nelly Wei, orthopedic, every 3-4 months, gets steroid injection    Lezama esophagus     BPH (benign prostatic hypertrophy)     Cholelithiasis     Cirrhosis ([address]     COVID-19 12/2020    pt reports he had a positive test while [facility] in [facility], was asymptomatic    COVID-19 vaccine series completed 5/20/2021, 6/22/2021    Moderna 5/20/2021, 6/22/2021    DDD (degenerative disc disease), lumbar     Depression     Esophageal cancer ([facility]     INVASIVE ADENOCARCINOMA ARISING IN TUBULAR ADENOMA WITH HIGH GRADE DYSPLASIA, ASSOCIATED WITH FOCAL INTESTINAL METAPLASIA     Esophageal varices ([address]     Fatty liver     GERD (gastroesophageal reflux disease)     Hep C w/o coma, chronic ([address]     History of alcohol abuse     6-12 beers a day; quit drinking July 2016    History of blood transfusion     History of colon polyps 2016    History of tobacco abuse     [place] (hard of hearing)     Hyperlipidemia     Hypertension     Port-A-Cath in place     right upper chest    Sciatica     Spinal stenosis     Stomach ulcer     hx of    Tubular adenoma of colon 2016, 2018    Vitamin D deficiency     Wears glasses         Plan:        1. Cirrhosis 2/2 EtOH abuse with ascites  1. S/p repeat para today  2. Currently receiving albumin  3. Will start lasix and aldactone  4. 2 gm sodium diet  5. Avoid sedatives and narcotics  6. US liver to evaluate portal/hepatic vein  2.  Severe anemia, GI bleeding,hx of esophageal cancer s/p EGD/colonoscopy revealing some radiation gastritis, no esophageal varices, portal hypertensive gastropathy, hemorrhoids, lesion sigmoid colon, polyp R colon  1. Bx pending  2. No overt bleeding  3. Hgb stable  4. Change to Protonix push BID  5. Okay for step down per GI perspective    Explained to the patient and d/W Nursing Staff  Will F/U with you  Please call or Page for any issues or change in status  Thanks    Electronically signed by MASSIMO Lopez NP on 4/18/2022 at 2:30 PM       GI attending physician note. Patient seen with APRN     I independently performed an evaluation on Mohsen Parks. I have reviewed the above documentation completed by the Nurse Practitioner and agree with the history, examination, and management plan. Recommendations discussed. Patient has significant ascites needing frequent paracentesis. GI bleeding appears to be stabilized. Tolerating soft diet. Advised to have ultrasound of the portal vein and hepatic vein. Increase diuretics. May transfer to floor.

## 2022-04-18 NOTE — CARE COORDINATION
ONGOING DISCHARGE PLAN:    Patient is alert and oriented x4. Spoke with patient regarding discharge plan and patient confirms that plan is still home with [facility]. SW also following for [place]. Pt on protonix gtt, 4/16 colonoscopy showed no bleeding. 4/14 EGD and IR guided paracentesis for 5 L. HGB 9.3 today plt 78 today, . Pt to have liver ultrasound today to r/o portal vein thrombosis. Soft diet . PT/OT eval.     Will continue to follow for additional discharge needs.     Electronically signed by Claire Gerardo RN on 4/18/2022 at 10:29 AM

## 2022-04-18 NOTE — PLAN OF CARE
Nutrition Problem #1: Inadequate oral intake  Intervention: Food and/or Nutrient Delivery: Continue Current Diet,Discontinue Oral Nutrition Supplement  Nutritional Goals: Meet estimated nutrient needs

## 2022-04-18 NOTE — PROGRESS NOTES
Comprehensive Nutrition Assessment    Type and Reason for Visit:  Initial    Nutrition Recommendations/Plan: Modified diet to discontinue the soft & bite sized which pt states he doesn't need. Discontinued supplements. Nutrition Assessment:  Seeing pt for length of stay. Pt was at the pain care clinic to get steroid injection in his back but couldn't tolerate laying on his stomach so sent to ED. Pt has hx cirrhosis of the liver and abdomen is bloated and most likely needs paracentesis done. In ED pt found to have a hemoglobin of 3.5, he has known iron deficiency on FE at home. Pt had paracentesis done today with 5 liters removed. Pulmonology ordered 1 dose IV Lasix since he is about 5 liters ahead. Pt states his appetite is good but doesn't need his food diced or cut up, writer indicated would discontinue it but need to continue with 2 gm sodium restriction. Malnutrition Assessment:  Malnutrition Status:  No malnutrition    Context:  Acute Illness     Findings of the 6 clinical characteristics of malnutrition:  Energy Intake:  No significant decrease in energy intake  Weight Loss:  No significant weight loss     Body Fat Loss:  No significant body fat loss     Muscle Mass Loss:  No significant muscle mass loss    Fluid Accumulation:  No significant fluid accumulation     Strength:  Not Performed    Estimated Daily Nutrient Needs:  Energy (kcal):  2145 kcals based on Starr-[place]. A[place]a Font with 1.2 factor using adm wt 98 kg; Weight Used for Energy Requirements:  Admission     Protein (g):   gm protein based on 1.3-1.4 gm/kg using IBW; Weight Used for Protein Requirements:  Ideal          Nutrition Related Findings:  Edema: Trace generalized. 4-18 Paracentesis done with 5 liters removed. 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 Most recent EGD done 1-21-22 negative for malignancy.       Wounds:  None       Current Nutrition Therapies:    ADULT ORAL NUTRITION SUPPLEMENT; Breakfast, Lunch, Dinner; Clear Liquid Oral Supplement  ADULT DIET; Regular; Low Sodium (2 gm)    Anthropometric Measures:  · Height: 5' 10\" (177.8 cm)  · Current Body Weight: 228 lb (103.4 kg) (prior to paracentesis done today)   · Admission Body Weight: 216 lb (98 kg)    · Ideal Body Weight: 166 lbs; % Ideal Body Weight 137.3 %   · BMI: 32.7  · BMI Categories: Obese Class 1 (BMI 30.0-34. 9)       Nutrition Diagnosis:   · Inadequate oral intake related to altered GI function,pain as evidenced by poor intake prior to admission      Nutrition Interventions:   Food and/or Nutrient Delivery:  Continue Current Diet,Discontinue Oral Nutrition Supplement  Nutrition Education/Counseling:  No recommendation at this time   Coordination of Nutrition Care:  Continue to monitor while inpatient    Goals:  Meet estimated nutrient needs       Nutrition Monitoring and Evaluation:   Behavioral-Environmental Outcomes:  None Identified   Food/Nutrient Intake Outcomes:  Food and Nutrient Intake  Physical Signs/Symptoms Outcomes:  Biochemical Data,GI Status,Fluid Status or Edema,Nutrition Focused Physical Findings,Skin,Weight     Discharge Planning: Too soon to determine     Some areas of assessment may be incomplete due to COVID-19 precautions. Sarah Eason R.D. L.SOFIE.   Clinical Dietitian  Office: [phone]

## 2022-04-18 NOTE — PROGRESS NOTES
[facility]    PROGRESS NOTE             4/18/2022    9:18 AM    Name:   Salvador Sarah  MRN:     [id number]     [place]:      [de-identified]   Room:   2007/2007-01  IP Day:  5  Admit Date:  4/13/2022 10:10 AM    PCP:  VASU Calderon  Code Status:  Full Code    Subjective:     C/C:   Chief Complaint   Patient presents with    Shortness of Breath     Interval History Status: improved. Patient, this morning, is complaining of a headache. RN was in the room and noted that he was due for his next dose of oxycodone shortly. Patient request Tylenol and it is explained to him that he should avoid Tylenol due to his liver disease. Hemoglobin has been stable, denies any dark or bloody stools. He states that he needs to have his abdomen drained again as it is contributing to shortness of breath and his back pain. Explained to him that this would need to be coordinated with GI, possibly outpatient. Brief History:     The patient is a 58 y.o.  Non- / non  male with PMH of alcoholic liver disease/cirrhosis, history of hepatitis C (treated).  He is a former smoker with 50+ pack year history, and admits to former heavy alcohol use.  Denies illicit or IV drug use.  Patient also has a history of GE junction adenocarcinoma s/p chemoradiation completed 6/9/21 and PET 7/23/21 with no recurrence, as well as most recent EGD on 1/21/22 which was negative for malignanacy.  This EGD also showed severe portal hypertensive gastropathy with some oozing of blood visualized.     He presented to the emergency department today from pain management clinic where he was scheduled to have an epidural steroid injection for back pain due to spinal stenosis.  However, he was unable to lie prone due to abdominal distention and shortness of breath and he was sent to the emergency department.      According to the patient, he was scheduled for therapeutic paracentesis today at [facility] this afternoon.  His most recent paracentesis was 4/5/2022 with 7.5 L removed.  Per patient, he states that it accumulated quickly afterwards and came back Nunezamee Alex. \"  This was his second time having paracentesis done; previous paracentesis done in 2016.  He states that he has not been taking his spironolactone for the past few days, as he felt that it was making his blood pressure too low.  He reports that he took all other home medications, though.     Currently, patient states he has some shortness of breath which she attributes to abdominal distention, as well as nausea. However, denies  fever, chills, chest pain, abdominal pain, hematuria, or dark/bloody stool.  Last bowel movement was yesterday     In the emergency department, hemoglobin was found to be 3.6, recheck 3.5. Sodium 120.  Creatinine 1.42, baseline within normal limits.  Total bili 2.4.  INR 1.5.       He was given a dose of Protonix and 1 unit PRBCs in the ED.     He is being admitted to the hospital for the management of severe anemia and sepsis.     4/13: pt received 3 units PRBCs, venofer added per heme/onc     4/14: IR guided paracentesis 5L removed, FOBT positive, bowel movement revealed bright red blood per rectum; hgb 6.9 despite receiving 6 units of blood and FFP, emergent EGD overnight following additional episodes of hematochezia, no annamaria bleeding noted, may need colonoscopy     4/15: more bloody stools, plan for colonoscopy tomorrow, may need nuclear bleeding scan, as well     4/16: colonoscopy, no source of active bleed visualized, hemoglobin stabilizing, platelets trending down, hyponatremia improving but still low    4/17: hgb stable, advance diet slowly    4/18: liver u/s today to assess for portal vein thrombosis    Review of Systems:     Review of Systems   Constitutional: Negative for chills and fever. Respiratory: Positive for shortness of breath. Cardiovascular: Negative for chest pain. Gastrointestinal: Positive for abdominal distention. Negative for abdominal pain, constipation, diarrhea, nausea and vomiting. Musculoskeletal: Positive for back pain. Neurological: Positive for headaches. All other systems reviewed and are negative. Medications: Allergies:     Allergies   Allergen Reactions    Bee Pollen Other (See Comments)     Runny nose, watery eyes    Pollen Extract      Runny nose, watery eyes       Current Meds:   Scheduled Meds:    sodium chloride flush  5-40 mL IntraVENous 2 times per day    atorvastatin  20 mg Oral Nightly    [Held by provider] FLUoxetine  20 mg Oral Daily    sodium chloride flush  5-40 mL IntraVENous BID    rifaximin  550 mg Oral BID    lactulose  20 g Oral TID     Continuous Infusions:    sodium chloride      pantoprazole 8 mg/hr (04/18/22 0821)    dextrose 5 % and 0.9 % NaCl 50 mL/hr at 04/17/22 2204    sodium chloride      sodium chloride       PRN Meds: sodium chloride, oxyCODONE, potassium chloride **OR** potassium alternative oral replacement **OR** potassium chloride, magnesium sulfate, sodium chloride, sodium chloride flush, sodium chloride, ondansetron **OR** ondansetron, polyethylene glycol, midodrine    Data:     Past Medical History:   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 ([facility] [address] Anxiety, Arthritis, Back pain, chronic, Lezama esophagus, BPH (benign prostatic hypertrophy), Cholelithiasis, Cirrhosis ([facility] [address] COVID-19, COVID-19 vaccine series completed, DDD (degenerative disc disease), lumbar, Depression, Esophageal cancer ([facility] [address] [facility] Fatty liver, GERD (gastroesophageal reflux disease), Hep C w/o coma, chronic ([facility] History of alcohol abuse, History of blood transfusion, History of colon polyps, History of tobacco abuse, [place] (hard of hearing), Hyperlipidemia, Hypertension, Port-A-Cath in place, Sciatica, Spinal stenosis, Stomach ulcer, Tubular adenoma of colon, Vitamin D deficiency, and Wears glasses. Social History:   reports that he quit smoking about 5 years ago. He has a 45.00 pack-year smoking history. He has never used smokeless tobacco. He reports previous alcohol use. He reports previous drug use. Frequency: 1.00 time per week. Family History:   Family History   Problem Relation Age of Onset    Cancer Mother         pancreatic    Cancer Father         bone    Diabetes Sister     Asthma Brother        Vitals:  BP (!) 153/87   Pulse 82   Temp 97.9 °F (36.6 °C)   Resp 22   Wt 228 lb 6.3 oz (103.6 kg)   SpO2 96%   BMI 32.77 kg/m²   Temp (24hrs), Av.9 °F (36.6 °C), Min:96.8 °F (36 °C), Max:98.5 °F (36.9 °C)    No results for input(s): POCGLU in the last 72 hours. I/O(24Hr):     Intake/Output Summary (Last 24 hours) at 2022 0918  Last data filed at 2022 1918  Gross per 24 hour   Intake 2158.23 ml   Output --   Net 2158.23 ml       Labs:    CBC:   Lab Results   Component Value Date    WBC 7.5 2022    RBC 3.35 2022    RBC 4.75 2012    HGB 9.3 2022    HCT 29.1 2022    MCV 86.9 2022    MCH 27.8 2022    MCHC 32.0 2022    RDW 19.8 2022    PLT 78 2022     2012    MPV 6.9 2022     BMP:    Lab Results   Component Value Date     2022    K 4.2 2022     2022    CO2 21 2022    BUN 4 2022    LABALBU 2.9 04/15/2022    LABALBU 4.7 2012    CREATININE <0.40 2022    CALCIUM 7.6 2022    GFRAA Can not be calculated 2022    LABGLOM Can not be calculated 2022    GLUCOSE 132 2022    GLUCOSE 65 2012     Sodium:    Lab Results   Component Value Date     2022     Ionized Calcium:  No results found for: IONCA    Lab Results   Component Value Date/Time    SPECIAL NOT REPORTED 2022 11:28 AM     Lab Results   Component Value Date/Time    CULTURE NO GROWTH 3 DAYS 2022 03:30 PM         Radiology:    XR CHEST PORTABLE    Result Date: 4/15/2022  EXAMINATION: ONE XRAY VIEW OF THE CHEST 4/15/2022 7:59 am COMPARISON: 02/02/2022, 12/21/2021 HISTORY: ORDERING SYSTEM PROVIDED HISTORY: Dyspnea TECHNOLOGIST PROVIDED HISTORY: Dyspnea Reason for Exam: Dyspnea FINDINGS: Right chest port terminates in the superior vena cava. Mild pulmonary vascular congestion. No focal pulmonary opacities. Calcified granulomata and calcified mediastinal nodes from prior granulomatous infection. Cardiomegaly which is similar to 4 months prior. No acute bony findings. Mild pulmonary vascular congestion. Cardiomegaly. IR US GUIDED PARACENTESIS    Result Date: 4/14/2022  PROCEDURE: PARACENTESIS WITH IMAGE GUIDANCE US ABDOMEN LIMITED 4/14/2022 HISTORY: ORDERING SYSTEM PROVIDED HISTORY: ascites remove 5 liters only TECHNOLOGIST PROVIDED HISTORY: ascites remove 5 liters only TECHNIQUE: Informed consent was obtained after a detailed explanation of the procedure including risks, benefits, and alternatives. Universal protocol was followed. A limited ultrasound of the abdomen was performed and shows a moderate to large amount of ascites. The right abdomen was prepped and draped in sterile fashion and local anesthesia was achieved with lidocaine. A 5 Romanian needle sheath was advanced into ascites using realtime ultrasound guidance and paracentesis was performed. The patient tolerated the procedure well. EBL: None FINDINGS: Limited ultrasound of the abdomen demonstrates ascites. A total of 5 L of slightly turbid yellow colored fluid was removed. Additional fluid remains on completion. Successful ultrasound-guided paracentesis.      IR US GUIDED PARACENTESIS    Result Date: 4/5/2022  PROCEDURE: IR US GUIDED PARACENTESIS W IMAGING 4/5/2022 HISTORY: ORDERING SYSTEM PROVIDED HISTORY: Ascites due to alcoholic cirrhosis (HCC) TECHNIQUE: Sonography was utilized CONTRAST: None SEDATION: None FLUOROSCOPY DOSE AND TYPE OR TIME AND EXPOSURES: None DESCRIPTION OF PROCEDURE: Informed consent was obtained after a detailed explanation of the procedure including risks, benefits, and alternatives. Universal protocol was observed. Preprocedure ultrasound shows a dominant fluid pocket in the right lowerquadrant. Using ultrasound guidance (image attached to the medical record), the fluid pocket was accessed with a 5 [place] Lorrie Yueh needle with aspiration of inch clear yellow fluid. Vacuum bottle paracentesis was performed and approximately 7.25 L was removed. the patient tolerated the procedure well and left the department in good condition. Dr. Luiza Peters was present. Patient received IV albumin replacement. FINDINGS: 7.25 L drained     Successful ultrasound-guided therapeutic paracentesis         Physical Examination:        Physical Exam  Constitutional:       General: He is not in acute distress. Comments: Fidgeting in bed, frustrating in regards to his headache   Eyes:      General: No scleral icterus. Extraocular Movements: Extraocular movements intact. Conjunctiva/sclera: Conjunctivae normal.   Cardiovascular:      Rate and Rhythm: Normal rate and regular rhythm. Pulmonary:      Effort: Pulmonary effort is normal.      Breath sounds: Wheezing present. No rhonchi or rales. Abdominal:      General: Bowel sounds are normal. There is distension. Tenderness: There is no abdominal tenderness. Musculoskeletal:      Right lower leg: No edema. Left lower leg: No edema. Skin:     Coloration: Skin is jaundiced. Neurological:      General: No focal deficit present. Mental Status: He is alert and oriented to person, place, and time.          Assessment:        Primary Problem  Gastrointestinal hemorrhage with melena    Active Hospital Problems    Diagnosis Date Noted    Anemia [D64.9] 04/13/2022    Acute kidney injury ([facility] [address] [N17.9] 04/13/2022    Esophageal adenocarcinoma ([facility] [address] [C15.9]     Former smoker, 50+ pack years, quit 2016 [Z76.827] 12/20/2021    Spinal stenosis of lumbar region with neurogenic claudication [M48.062] 12/14/2021    Malignant neoplasm of lower third of esophagus (HCC) [C15.5]     Gastrointestinal hemorrhage with melena [K92.1]     Hepatitis C virus infection resolved after antiviral drug therapy [Z86.19] 12/23/2020    Hepatic cirrhosis ([facility] [address] [K74.60] 09/15/2016    GERD (gastroesophageal reflux disease) [K21.9] 04/19/2012       Plan:        Severe anemia in the setting of cirrhosis and portal hypertension, likely secondary to GI bleed, stable   -Hemoglobin 3.5 on admission, 9.3 this morning  -Has received 7 units PRBCs and 1 unit FFP total since admission (+1 unit in ED)  -Protonix drip continued per GI   -FOBT positive and pt was having melena/hematochezia; no longer appears to be having bloody bowel movements  -EGD and colonoscopy did not reveal source of active bleed  -Monitor hgb, transfuse if <7  -GI is following     Thrombocytopenia, trending down  -platelets 521 on admission  -Has received 7 units of blood, 1 unit FFP  -Platelets this morning 78, stabilizing, continue to monitor  -does not appear to be bleeding currently, hgb stable      Hyponatremia, stable  -Sodium 120 on admission, 129 this AM  -Pt is not chronically hyponatremic  -Patient is likely intravascularly volume depleted 2/2 third spacing from ascites  -IV fluid hydration with D5 NS at 50cc/h     Ascites 2/2 cirrhotic liver disease  -cirrhosis 2/2 heavy alcohol use in the past; patient also has history of hepatitis C s/p treatment  -Patient had IR guided paracentesis on 4/5/2022 and had rapid reaccumulation of ascitic fluid during this time; was scheduled for paracentesis on day of admission  -s/p IR guided paracentesis 4/14; 5L max removed per GI recommendations, fluid has reaccumulated   -MELD score 27, [place] score 8    -Will need outpatient GI to coordinate need for regular therapeutic paracentesis     Acute kidney injury, resolved  -pt does not have history of CKD or hepatorenal syndrome  -Creatinine 1.42 on admission, wnl now  -FeUrea 19.5% indicative of prerenal injury  -continue IV fluids D5 NS at 50cc/h     Hypokalemia, resolved, continue to monitor and replace per protocol as needed     Sepsis, ruled out   -Tachycardia and WBCs 14.2 on admission  -Lactic acid 2.0  -SBP in 100s, midodrine 5mg TIID PRN for SBP <110,   -Urinalysis positive for nitrites and leukocyte esterase, no hgb, WBC 0-2, no growth on urine culture - UTI ruled out  -Leukocytosis resolved  -blood cultures no growth  -IV rocephin for UTI and SBP ppx, as well as rifaximin; SBP ruled out based on results of fluid analysis (see below), d/c rocephin  -IR guided paracentesis 4/14; culture no growth 10 hours, cell count not indicative of SBP, only 63 WBCs/mm3, 19% neutrophils     Portal hypertension, with findings of portal hypertensive gastropathy on EGD, held home lasix, nadolol, and spironolactone d/t hypotension     History of GE junction adenocarcinoma, in remission  -pt completed chemoradiation 6/9/2021; PET scan 7/23/21 showed no recurrence  -Most recent EGD done 1/21/22: flat 3-4mm polyp at GE junction which showed no malignancy  -Polypoid lesion seen on EGD 4/15, not biopsied because of bleeding risk, will need outpatient follow up     GI PPx: Protonix drip  DVT prophylaxis: SCD cuffs, no chemical anticoagulation at this time due to severe anemia and possible active bleed  Diet: clear liquids    Orlando Chapin MD  4/18/2022  9:18 AM     Attending Physician Statement  I have discussed the care of Juan Pablo Brandon and I have examined the patient myselft and taken ros and hpi , including pertinent history and exam findings,  with the resident. I have reviewed the key elements of all parts of the encounter with the resident. I agree with the assessment, plan and orders as documented by the resident.   Spent 35 minutes in reviewing data/medicines/talking to patient/family, explaining and answering all the questions.     Severe anemia,  Alcoholic cirrhosis,  Portal hypertension,  Acute GI bleed, admitted with a hemoglobin of 3.5, status post replacement of 7 units, check ferritin levels,  Thrombocytopenia,  Hyponatremia chronic sinusitis, status post paracentesis, repeat ordered by IR,  Acute kidney injury, hepatorenal syndrome,  Hypokalemia,  History of GE junction adenocarcinoma in remission as per hematology oncology, work-up under progress,  Overall prognosis very poor,      Electronically signed by Franciso Schaumann, MD

## 2022-04-18 NOTE — PROGRESS NOTES
Pt arrives to room 2115 per wheelchair from ICU. With belon ings, chart, and meds. Pt transferred to bed with assist of 1. HOB elevated for pt comfort. No acute distress noted. telemetry applied, VS done and charted. EPC cuffs applied. Call light within reach.

## 2022-04-18 NOTE — PROGRESS NOTES
Physical Therapy  Facility/Department: [facility] ICU  Daily Treatment Note  NAME: Salvador Sarah  : 1960  MRN: [id number]    Date of Service: 2022    Discharge Recommendations:  Patient would benefit from continued therapy after discharge   PT Equipment Recommendations  Equipment Needed: No    Assessment   Body structures, Functions, Activity limitations: Decreased functional mobility ; Decreased balance; Increased pain;Decreased posture  Treatment Diagnosis: Inc pain and decreased posture  Specific instructions for Next Treatment: Progress to stair negotiation 3-4 steps; reinforce postural correction statically/dynamically  REQUIRES PT FOLLOW UP: Yes  Activity Tolerance  Activity Tolerance: Patient Tolerated treatment well     Patient Diagnosis(es): The encounter diagnosis was Anemia, unspecified type. 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 ([facility] [address] Anxiety, Arthritis, Back pain, chronic, Lezama esophagus, BPH (benign prostatic hypertrophy), Cholelithiasis, Cirrhosis ([address] COVID-19, COVID-19 vaccine series completed, DDD (degenerative disc disease), lumbar, Depression, Esophageal cancer ([address] Esophageal varices ([address] Fatty liver, GERD (gastroesophageal reflux disease), Hep C w/o coma, chronic ([address] History of alcohol abuse, History of blood transfusion, History of colon polyps, History of tobacco abuse, [place] (hard of hearing), Hyperlipidemia, Hypertension, Port-A-Cath in place, Sciatica, Spinal stenosis, Stomach ulcer, Tubular adenoma of colon, Vitamin D deficiency, and Wears glasses. has a past surgical history that includes Bunionectomy; Nasal septum surgery; other surgical history (16); Colonoscopy; Colonoscopy (10/05/2016); other surgical history (2016); other surgical history (2016); knee surgery (Left); Bunionectomy (Left); Endoscopy, colon, diagnostic; pr revise median n/carpal tunnel surg (Right, 2017);  Carpal tunnel release (Right); pr revise median n/carpal tunnel surg (Left, 10/31/2017); Colonoscopy (N/A, 3/30/2018); Colonoscopy (03/30/2018); pr njx aa&/strd tfrml epi lumbar/sacral 1 level (Bilateral, 9/6/2018); other surgical history (09/28/2018); pr njx aa&/strd tfrml epi lumbar/sacral 1 level (N/A, 9/28/2018); Pain management procedure (Left, 7/9/2020); Pain management procedure (Left, 7/20/2020); Pain management procedure (Bilateral, 8/17/2020); other surgical history (Right, 11/23/2020); Nerve Block (Right, 11/23/2020); Pain management procedure (Bilateral, 12/7/2020); Upper gastrointestinal endoscopy (N/A, 12/29/2020); Upper gastrointestinal endoscopy (N/A, 2/2/2021); Upper gastrointestinal endoscopy (N/A, 2/12/2021); Upper gastrointestinal endoscopy (2/12/2021); [place] tooth extraction; IR PORT PLACEMENT > 5 YEARS (4/19/2021); Upper gastrointestinal endoscopy (N/A, 8/31/2021); Upper gastrointestinal endoscopy (N/A, 1/21/2022); Upper gastrointestinal endoscopy (N/A, 4/15/2022); and Colonoscopy (N/A, 4/16/2022). Restrictions  Restrictions/Precautions  Restrictions/Precautions: General Precautions,Fall Risk,Up as Tolerated  Required Braces or Orthoses?: No  Implants present? :  (none)  Subjective   General  Chart Reviewed: Yes  Additional Pertinent Hx: Pt is a 57 y.o. male with H/O hepatic Cirrhosis, anemia, lumbar stenosis, and cancer presents to [facility] from pain management clinic where he was scheduled to have an epidural steroid injection for back pain due to spinal stenosis. However, he was unable to lie prone due to abdominal distention and shortness of breath and he was sent to the emergency department from pain management clinic where he was scheduled to have an epidural steroid injection for back pain due to spinal stenosis. most recent EGD on 1/21/22 which was negative for malignanacy. This EGD also showed severe portal hypertensive gastropathy with some oozing of blood visualized.      Response To Previous Treatment: Patient with no complaints from previous session. Family / Caregiver Present: No  Referring Practitioner: Dr. Leola Curran: Patient laying in bed upon arrival. Initially not agreeable to therapy. Stating that he wanted to take a nap. Provided encouragement and patient became agreeable to short session   General Comment  Comments: JERI Maldonado approved therapy   Pain Screening  Patient Currently in Pain: Denies  Vital Signs  Patient Currently in Pain: Denies       Orientation  Orientation  Overall Orientation Status: Within Functional Limits  Objective   Bed mobility  Rolling to Left: Supervision  Rolling to Right: Supervision  Supine to Sit: Supervision  Sit to Supine: Supervision  Scooting: Supervision  Transfers  Sit to Stand: Stand by assistance  Stand to sit: Stand by assistance  Bed to Chair: Contact guard assistance  Stand Pivot Transfers: Contact guard assistance  Ambulation  Ambulation?: Yes  Ambulation 1  Surface: level tile  Device: Rolling Walker  Assistance: Contact guard assistance;Stand by assistance  Quality of Gait: Narrow step width, Shoulders elevated with forward trunk lean  Gait Deviations: Decreased step height  Distance: 100ft  Comments: RW for all ambulation; cues for posture with fair carry over; Education on good posture to reduce pain and stiffness during activity. Stairs/Curb  Stairs?: Yes  Stairs  # Steps : 5  Stairs Height: 4\"  Rails: None  Device: Rolling walker  Assistance: Contact guard assistance  Comment: patient completed box step with RW placed overtop.  Patient with mild safety concerns, impulsive at times         Other exercises  Other exercises?: Yes  Other exercises 1: bed mobility x2  Other exercises 2: STS x2      Goals  Short term goals  Time Frame for Short term goals: 5 days  Short term goal 1: pt to demo independent bed mobility  Short term goal 2: pt to demo all transfers Mod I with RW demonstrating safe hands placement  Short term goal 3: pt to correct and maintain improved posture during static and dynamic standing  Short term goal 4: pt to negotiate 3-4 steps without railing to allow for safe access to home  Patient Goals   Patient goals : to return home     Plan    Plan  Times per week: 4-5x per week  Specific instructions for Next Treatment: Progress to stair negotiation 3-4 steps; reinforce postural correction statically/dynamically  Current Treatment Recommendations: Balance Training,Functional Mobility Training,Transfer Training,Gait Training,Stair training,Pain Management,Home Exercise Program,Patient/Caregiver Education & Training,Positioning  Safety Devices  Type of devices:  All fall risk precautions in place,Gait belt,Patient at risk for falls,Left in bed,Nurse notified        04/18/22 1411   PT Individual Minutes   Time In 3808   Time Out 1408   Minutes 11     Electronically signed by Conor Rm PTA on 4/18/22 at 2:19 PM EDT

## 2022-04-18 NOTE — PROGRESS NOTES
Patient tolerated paracentesis without distress. 4800 ml of clear yellow fluid removed. Dry dressing to site. Patient returned to room. Nurse updated.

## 2022-04-18 NOTE — CARE COORDINATION
SW spoke with Patient to confirm if Patient wanted to go to [address] [place], [place] [id number] if needed, Patient stated\" if I have to\". SW asked if there were any other facilities he would like to go to if needed, and he said \"no\". He liked that [facility] was close to here if needed. SW will continue to follow. [facility] is able to accept, but will need OT notes to start a Pre-cert.      Electronically signed by Paulo Harden on 4/18/22 at 1:37 PM EDT

## 2022-04-19 ENCOUNTER — APPOINTMENT (OUTPATIENT)
Dept: ULTRASOUND IMAGING | Age: 62
DRG: 378 | End: 2022-04-19
Payer: MEDICARE

## 2022-04-19 LAB
ANION GAP SERPL CALCULATED.3IONS-SCNC: 9 MMOL/L (ref 9–17)
BUN BLDV-MCNC: 3 MG/DL (ref 8–23)
CALCIUM SERPL-MCNC: 7.7 MG/DL (ref 8.6–10.4)
CHLORIDE BLD-SCNC: 101 MMOL/L (ref 98–107)
CO2: 22 MMOL/L (ref 20–31)
CREAT SERPL-MCNC: <0.4 MG/DL (ref 0.7–1.2)
EKG ATRIAL RATE: 73 BPM
EKG P AXIS: 53 DEGREES
EKG P-R INTERVAL: 146 MS
EKG Q-T INTERVAL: 400 MS
EKG QRS DURATION: 70 MS
EKG QTC CALCULATION (BAZETT): 440 MS
EKG R AXIS: 19 DEGREES
EKG T AXIS: 15 DEGREES
EKG VENTRICULAR RATE: 73 BPM
GFR AFRICAN AMERICAN: ABNORMAL ML/MIN
GFR NON-AFRICAN AMERICAN: ABNORMAL ML/MIN
GFR SERPL CREATININE-BSD FRML MDRD: ABNORMAL ML/MIN/{1.73_M2}
GLUCOSE BLD-MCNC: 120 MG/DL (ref 70–99)
HCT VFR BLD CALC: 30.4 % (ref 41–53)
HEMOGLOBIN: 9.9 G/DL (ref 13.5–17.5)
MCH RBC QN AUTO: 28.7 PG (ref 26–34)
MCHC RBC AUTO-ENTMCNC: 32.4 G/DL (ref 31–37)
MCV RBC AUTO: 88.4 FL (ref 80–100)
PDW BLD-RTO: 19.6 % (ref 11.5–14.9)
PLATELET # BLD: 83 K/UL (ref 150–450)
PMV BLD AUTO: 7.5 FL (ref 6–12)
POTASSIUM SERPL-SCNC: 3.7 MMOL/L (ref 3.7–5.3)
RBC # BLD: 3.45 M/UL (ref 4.5–5.9)
SODIUM BLD-SCNC: 132 MMOL/L (ref 135–144)
SURGICAL PATHOLOGY REPORT: NORMAL
WBC # BLD: 8.7 K/UL (ref 3.5–11)

## 2022-04-19 PROCEDURE — 6360000002 HC RX W HCPCS: Performed by: INTERNAL MEDICINE

## 2022-04-19 PROCEDURE — 6360000002 HC RX W HCPCS: Performed by: NURSE PRACTITIONER

## 2022-04-19 PROCEDURE — C9113 INJ PANTOPRAZOLE SODIUM, VIA: HCPCS | Performed by: INTERNAL MEDICINE

## 2022-04-19 PROCEDURE — 99232 SBSQ HOSP IP/OBS MODERATE 35: CPT | Performed by: INTERNAL MEDICINE

## 2022-04-19 PROCEDURE — 2580000003 HC RX 258

## 2022-04-19 PROCEDURE — 6370000000 HC RX 637 (ALT 250 FOR IP): Performed by: INTERNAL MEDICINE

## 2022-04-19 PROCEDURE — 36415 COLL VENOUS BLD VENIPUNCTURE: CPT

## 2022-04-19 PROCEDURE — 2060000000 HC ICU INTERMEDIATE R&B

## 2022-04-19 PROCEDURE — 6370000000 HC RX 637 (ALT 250 FOR IP): Performed by: STUDENT IN AN ORGANIZED HEALTH CARE EDUCATION/TRAINING PROGRAM

## 2022-04-19 PROCEDURE — P9047 ALBUMIN (HUMAN), 25%, 50ML: HCPCS | Performed by: NURSE PRACTITIONER

## 2022-04-19 PROCEDURE — 93010 ELECTROCARDIOGRAM REPORT: CPT | Performed by: INTERNAL MEDICINE

## 2022-04-19 PROCEDURE — 2580000003 HC RX 258: Performed by: INTERNAL MEDICINE

## 2022-04-19 PROCEDURE — 85027 COMPLETE CBC AUTOMATED: CPT

## 2022-04-19 PROCEDURE — C9113 INJ PANTOPRAZOLE SODIUM, VIA: HCPCS

## 2022-04-19 PROCEDURE — 6360000002 HC RX W HCPCS

## 2022-04-19 PROCEDURE — APPSS30 APP SPLIT SHARED TIME 16-30 MINUTES: Performed by: NURSE PRACTITIONER

## 2022-04-19 PROCEDURE — A4216 STERILE WATER/SALINE, 10 ML: HCPCS

## 2022-04-19 PROCEDURE — 6370000000 HC RX 637 (ALT 250 FOR IP): Performed by: NURSE PRACTITIONER

## 2022-04-19 PROCEDURE — 76705 ECHO EXAM OF ABDOMEN: CPT

## 2022-04-19 PROCEDURE — 80048 BASIC METABOLIC PNL TOTAL CA: CPT

## 2022-04-19 RX ORDER — FUROSEMIDE 10 MG/ML
40 INJECTION INTRAMUSCULAR; INTRAVENOUS ONCE
Status: COMPLETED | OUTPATIENT
Start: 2022-04-19 | End: 2022-04-19

## 2022-04-19 RX ORDER — ALBUMIN (HUMAN) 12.5 G/50ML
25 SOLUTION INTRAVENOUS ONCE
Status: COMPLETED | OUTPATIENT
Start: 2022-04-19 | End: 2022-04-19

## 2022-04-19 RX ADMIN — ATORVASTATIN CALCIUM 20 MG: 20 TABLET, FILM COATED ORAL at 21:46

## 2022-04-19 RX ADMIN — MIDODRINE HYDROCHLORIDE 5 MG: 5 TABLET ORAL at 09:00

## 2022-04-19 RX ADMIN — SODIUM CHLORIDE, PRESERVATIVE FREE 40 MG: 5 INJECTION INTRAVENOUS at 09:01

## 2022-04-19 RX ADMIN — RIFAXIMIN 550 MG: 550 TABLET ORAL at 09:01

## 2022-04-19 RX ADMIN — FUROSEMIDE 40 MG: 10 INJECTION, SOLUTION INTRAVENOUS at 14:42

## 2022-04-19 RX ADMIN — RIFAXIMIN 550 MG: 550 TABLET ORAL at 21:47

## 2022-04-19 RX ADMIN — FUROSEMIDE 40 MG: 40 TABLET ORAL at 09:01

## 2022-04-19 RX ADMIN — SODIUM CHLORIDE, PRESERVATIVE FREE 40 MG: 5 INJECTION INTRAVENOUS at 21:47

## 2022-04-19 RX ADMIN — MORPHINE SULFATE 2 MG: 2 INJECTION, SOLUTION INTRAMUSCULAR; INTRAVENOUS at 02:10

## 2022-04-19 RX ADMIN — SODIUM CHLORIDE, PRESERVATIVE FREE 10 ML: 5 INJECTION INTRAVENOUS at 09:03

## 2022-04-19 RX ADMIN — SPIRONOLACTONE 100 MG: 25 TABLET, FILM COATED ORAL at 09:02

## 2022-04-19 RX ADMIN — SODIUM CHLORIDE 8 MG/HR: 9 INJECTION, SOLUTION INTRAVENOUS at 04:51

## 2022-04-19 RX ADMIN — ALBUMIN (HUMAN) 25 G: 0.25 INJECTION, SOLUTION INTRAVENOUS at 14:41

## 2022-04-19 RX ADMIN — NADOLOL 20 MG: 20 TABLET ORAL at 09:01

## 2022-04-19 ASSESSMENT — PAIN SCALES - GENERAL
PAINLEVEL_OUTOF10: 4
PAINLEVEL_OUTOF10: 0

## 2022-04-19 ASSESSMENT — ENCOUNTER SYMPTOMS
NAUSEA: 0
CONSTIPATION: 0
SHORTNESS OF BREATH: 1
ABDOMINAL PAIN: 0
DIARRHEA: 0
BLOOD IN STOOL: 0
VOMITING: 0

## 2022-04-19 ASSESSMENT — PAIN DESCRIPTION - LOCATION: LOCATION: BACK

## 2022-04-19 ASSESSMENT — PAIN DESCRIPTION - DESCRIPTORS: DESCRIPTORS: SORE

## 2022-04-19 ASSESSMENT — PAIN DESCRIPTION - PAIN TYPE: TYPE: CHRONIC PAIN

## 2022-04-19 NOTE — PROGRESS NOTES
PRN  atorvastatin (LIPITOR) tablet 20 mg, Nightly  [Held by provider] FLUoxetine (PROZAC) capsule 20 mg, Daily  sodium chloride flush 0.9 % injection 5-40 mL, BID  rifaximin (XIFAXAN) tablet 550 mg, BID  lactulose (CHRONULAC) 10 GM/15ML solution 20 g, TID        Data:     Code Status:  Full Code    Family History   Problem Relation Age of Onset    Cancer Mother         pancreatic    Cancer Father         bone    Diabetes Sister     Asthma Brother        Social History     Socioeconomic History    Marital status: Single     Spouse name: Not on file    Number of children: Not on file    Years of education: Not on file    Highest education level: Not on file   Occupational History    Not on file   Tobacco Use    Smoking status: Former Smoker     Packs/day: 1.00     Years: 45.00     Pack years: 45.00     Quit date: 2017     Years since quittin.2    Smokeless tobacco: Never Used   Vaping Use    Vaping Use: Never used   Substance and Sexual Activity    Alcohol use: Not Currently     Comment: quit     Drug use: Not Currently     Frequency: 1.0 times per week     Comment: cocaine,  stopped spring 2016    Sexual activity: Yes     Partners: Female   Other Topics Concern    Not on file   Social History Narrative     in the past, retired     Social Determinants of Health     Financial Resource Strain:     Difficulty of Paying Living Expenses: Not on file   Food Insecurity:     Worried About [address] in the Last Year: Not on file    [address] in the Last Year: Not on file   Transportation Needs:     Lack of Transportation (Medical): Not on file    Lack of Transportation (Non-Medical):  Not on file   Physical Activity:     Days of Exercise per Week: Not on file    Minutes of Exercise per Session: Not on file   Stress:     Feeling of Stress : Not on file   Social Connections:     Frequency of Communication with Friends and Family: Not on file    Frequency of Social Gatherings with Friends and Family: Not on file    Attends Worship Services: Not on file    Active Member of Clubs or Organizations: Not on file    Attends Club or Organization Meetings: Not on file    Marital Status: Not on file   Intimate Partner Violence:     Fear of Current or Ex-Partner: Not on file    Emotionally Abused: Not on file    Physically Abused: Not on file    Sexually Abused: Not on file   Housing Stability:     Unable to Pay for Housing in the Last Year: Not on file    Number of [place] in the Last Year: Not on file    Unstable Housing in the Last Year: Not on file       Vitals:  /75   Pulse 65   Temp 97.8 °F (36.6 °C) (Oral)   Resp 18   Ht 5' 10\" (1.778 m)   Wt 228 lb 6.3 oz (103.6 kg)   SpO2 99%   BMI 32.77 kg/m²   Temp (24hrs), Av.9 °F (36.6 °C), Min:97.7 °F (36.5 °C), Max:98.2 °F (36.8 °C)    No results for input(s): POCGLU in the last 72 hours. I/O (24Hr):     Intake/Output Summary (Last 24 hours) at 2022 1542  Last data filed at 2022 1306  Gross per 24 hour   Intake 2419.41 ml   Output --   Net 2419.41 ml       Labs:      CBC:   Lab Results   Component Value Date    WBC 8.7 2022    RBC 3.45 2022    RBC 4.75 2012    HGB 9.9 2022    HCT 30.4 2022    MCV 88.4 2022    MCH 28.7 2022    MCHC 32.4 2022    RDW 19.6 2022    PLT 83 2022     2012    MPV 7.5 2022     CBC with Differential:    Lab Results   Component Value Date    WBC 8.7 2022    RBC 3.45 2022    RBC 4.75 2012    HGB 9.9 2022    HCT 30.4 2022    PLT 83 2022     2012    MCV 88.4 2022    MCH 28.7 2022    MCHC 32.4 2022    RDW 19.6 2022    NRBC 1 2022    LYMPHOPCT 3 2022    MONOPCT 11 2022    MYELOPCT 1 2021    BASOPCT 0 2022    MONOSABS 0.91 2022    LYMPHSABS 0.25 2022    EOSABS 0.08 2022 BASOSABS 0.00 04/16/2022    DIFFTYPE NOT REPORTED 02/02/2022     Hemoglobin/Hematocrit:    Lab Results   Component Value Date    HGB 9.9 04/19/2022    HCT 30.4 04/19/2022     CMP:    Lab Results   Component Value Date     04/19/2022    K 3.7 04/19/2022     04/19/2022    CO2 22 04/19/2022    BUN 3 04/19/2022    CREATININE <0.40 04/19/2022    GFRAA Can not be calculated 04/19/2022    LABGLOM Can not be calculated 04/19/2022    GLUCOSE 120 04/19/2022    GLUCOSE 65 04/19/2012    PROT 4.5 04/15/2022    LABALBU 2.9 04/15/2022    LABALBU 4.7 04/19/2012    CALCIUM 7.7 04/19/2022    BILITOT 6.08 04/15/2022    ALKPHOS 90 04/15/2022    AST 34 04/15/2022    ALT 17 04/15/2022     BMP:    Lab Results   Component Value Date     04/19/2022    K 3.7 04/19/2022     04/19/2022    CO2 22 04/19/2022    BUN 3 04/19/2022    LABALBU 2.9 04/15/2022    LABALBU 4.7 04/19/2012    CREATININE <0.40 04/19/2022    CALCIUM 7.7 04/19/2022    GFRAA Can not be calculated 04/19/2022    LABGLOM Can not be calculated 04/19/2022    GLUCOSE 120 04/19/2022    GLUCOSE 65 04/19/2012     PT/INR:    Lab Results   Component Value Date    PROTIME 18.3 04/13/2022    INR 1.5 04/13/2022     PTT:    Lab Results   Component Value Date    APTT 34.8 04/13/2022   [APTT}    Physical Examination:        General appearance: alert, cooperative and no distress  Mental Status: oriented to person, place and time and normal affect  Abdomen: soft, nontender, distended, bowel sounds present  Extremities: no edema, redness or tenderness in the calves  Skin: no gross lesions, rashes, or induration    Assessment:        Primary Problem  Gastrointestinal hemorrhage with melena     Active Hospital Problems    Diagnosis Date Noted    Anemia [D64.9] 04/13/2022    [facility] kidney [facility] ([facility] [N17.9] 04/13/2022    Esophageal adenocarcinoma ([facility] [C15.9]     Former smoker, 50+ pack years, quit 2016 [Z87.891] 12/20/2021    Spinal stenosis of lumbar region with neurogenic claudication [M48.062] 12/14/2021    Malignant neoplasm of lower third of esophagus ([address] [C15.5]     Gastrointestinal hemorrhage with melena [K92.1]     Hepatitis C virus infection resolved after antiviral drug therapy [Z86.19] 12/23/2020    Hepatic cirrhosis ([address] [K74.60] 09/15/2016    GERD (gastroesophageal reflux disease) [K21.9] 04/19/2012     Past Medical History:   Diagnosis Date    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 Sonia Bloom, orthopedic, every 3-4 months, gets steroid injection    Lezama esophagus     BPH (benign prostatic hypertrophy)     Cholelithiasis     Cirrhosis ([address]     COVID-19 12/2020    pt reports he had a positive test while at [facility] in 2020, was asymptomatic    COVID-19 vaccine series completed 5/20/2021, 6/22/2021    Moderna 5/20/2021, 6/22/2021    DDD (degenerative disc disease), lumbar     Depression     Esophageal cancer ([address]     INVASIVE ADENOCARCINOMA ARISING IN TUBULAR ADENOMA WITH HIGH GRADE DYSPLASIA, ASSOCIATED WITH FOCAL INTESTINAL METAPLASIA     Esophageal varices ([address]     Fatty liver     GERD (gastroesophageal reflux disease)     Hep C w/o coma, chronic ([address]     History of alcohol abuse     6-12 beers a day; quit drinking July 2016    History of blood transfusion     History of colon polyps 2016    History of tobacco abuse     [place] (hard of hearing)     Hyperlipidemia     Hypertension     Port-A-Cath in place     right upper chest    Sciatica     Spinal stenosis     Stomach ulcer     hx of    Tubular adenoma of colon 2016, 2018    Vitamin D deficiency     Wears glasses         Plan:        1. Cirrhosis 2/2 EtOH abuse with ascites  1. Continue lasix and aldactone  2. Monitor BMP  3. Will give extra dose lasix and albumin today  4. 2 gm sodium diet  5. Avoid sedatives and narcotics  6. US liver reviewed, hepatopetal flow  7.  If recurrent/refractory ascites, will need to discuss TIPS further  8. Xifaxan  9. Lactulose  2. Severe anemia, GI bleeding,hx of esophageal cancer s/p EGD/colonoscopy revealing some radiation gastritis, no esophageal varices, portal hypertensive gastropathy, hemorrhoids, lesion sigmoid colon, polyp R colon  1. Bx pending  2. No overt bleeding  3. Hgb stable  4. Continue PPI  5. Continue Nadolol      Explained to the patient and d/W Nursing Staff  Will F/U with you  Please call or Page for any issues or change in status  Thanks    Electronically signed by MASSIMO Isabel NP on 4/19/2022 at 3:42 PM         GI attending physician note. Patient seen with MASSIMO   I independently performed an evaluation on [place]. I have reviewed the above documentation completed by the Nurse Practitioner and agree with the history, examination, and management plan. Recommendations discussed. Patient has ascites. Looks like he is having issue in the last 2 months. Ultrasound of the liver did not reveal portal vein thrombosis. We will keep him on 2 g sodium diet, and diuretics.     If stable may be discharged in the 24 to 48 hours

## 2022-04-19 NOTE — PLAN OF CARE
Problem: Falls - Risk of:  Goal: Will remain free from falls  Description: Will remain free from falls  Outcome: Ongoing  Note: Patient remains free of falls and injuries throughout shift. Bed remains in the lowest position, wheels locked, call light and bedside table are within reach. Goal: Absence of physical injury  Description: Absence of physical injury  Outcome: Ongoing     Problem: Activity:  Goal: Fatigue will decrease  Description: Fatigue will decrease  Outcome: Ongoing  Goal: Ability to tolerate increased activity will improve  Description: Ability to tolerate increased activity will improve  Outcome: Ongoing  Goal: Ability to maintain optimal joint mobility will improve  Description: Ability to maintain optimal joint mobility will improve  Outcome: Ongoing     Problem:  Bowel/Gastric:  Goal: Ability to achieve a regular elimination pattern will improve  Description: Ability to achieve a regular elimination pattern will improve  Outcome: Ongoing     Problem: Cardiac:  Goal: Ability to maintain an adequate cardiac output will improve  Description: Ability to maintain an adequate cardiac output will improve  Outcome: Ongoing  Goal: Ability to maintain adequate ventilation will improve  Description: Ability to maintain adequate ventilation will improve  Outcome: Ongoing  Goal: Ability to achieve and maintain adequate cardiopulmonary perfusion will improve  Description: Ability to achieve and maintain adequate cardiopulmonary perfusion will improve  Outcome: Ongoing     Problem: Coping:  Goal: Ability to adjust to condition or change in health will improve  Description: Ability to adjust to condition or change in health will improve  Outcome: Ongoing  Goal: Communication of feelings regarding changes in body function or appearance will improve  Description: Communication of feelings regarding changes in body function or appearance will improve  Outcome: Ongoing     Problem: Health Behavior:  Goal: Identification of resources available to assist in meeting health care needs will improve  Description: Identification of resources available to assist in meeting health care needs will improve  Outcome: Ongoing     Problem: Nutritional:  Goal: Maintenance of adequate nutrition will improve  Description: Maintenance of adequate nutrition will improve  Outcome: Ongoing     Problem: Physical Regulation:  Goal: Signs and symptoms of infection will decrease  Description: Signs and symptoms of infection will decrease  Outcome: Ongoing  Goal: Will show no signs and symptoms of excessive bleeding  Description: Will show no signs and symptoms of excessive bleeding  Outcome: Ongoing  Goal: Complications related to the disease process, condition or treatment will be avoided or minimized  Description: Complications related to the disease process, condition or treatment will be avoided or minimized  Outcome: Ongoing     Problem: Safety:  Goal: Ability to remain free from injury will improve  Description: Ability to remain free from injury will improve  Outcome: Ongoing     Problem: Sensory:  Goal: Pain level will decrease  Description: Pain level will decrease  Outcome: Ongoing  Goal: General experience of comfort will improve  Description: General experience of comfort will improve  Outcome: Ongoing     Problem: Skin Integrity:  Goal: Skin integrity will improve  Description: Skin integrity will improve  Outcome: Ongoing  Goal: Signs of wound healing will improve  Description: Signs of wound healing will improve  Outcome: Ongoing  Goal: Will show no infection signs and symptoms  Description: Will show no infection signs and symptoms  Outcome: Ongoing  Note: Assess the overall condition of the skin. Check on bony prominences such as the sacrum, trochanters, scapulae, elbows, heels, inner and outer malleolus, inner and outer knees, back of head). Reinforce the importance of turning, mobility, and ambulation.    Goal: Absence of new skin breakdown  Description: Absence of new skin breakdown  Outcome: Ongoing     Problem: Tissue Perfusion:  Goal: Ability to maintain adequate tissue perfusion will improve  Description: Ability to maintain adequate tissue perfusion will improve  Outcome: Ongoing  Goal: Ability to maintain a stable neurologic state will improve  Description: Ability to maintain a stable neurologic state will improve  Outcome: Ongoing     Problem: Pain:  Goal: Pain level will decrease  Description: Pain level will decrease  Outcome: Ongoing  Goal: Control of acute pain  Description: Control of acute pain  Outcome: Ongoing  Goal: Control of chronic pain  Description: Control of chronic pain  Outcome: Ongoing     Problem: Musculor/Skeletal Functional Status  Goal: Highest potential functional level  Outcome: Ongoing  Goal: Absence of falls  Outcome: Ongoing     Problem: Nutrition  Goal: Optimal nutrition therapy  Outcome: Ongoing

## 2022-04-19 NOTE — PROGRESS NOTES
sign positive    Lungs - CTA. No wheezes, rales, rhonchi. No retractions. No accessory muscle use.    Cardiovascular - Heart sounds are normal.  Regular rate and rhythm   Abdomen - Full but improved from penny exam. nontender, no masses or organomegaly  Neurologic - There are no focal motor or sensory deficits  Skin - No bruising or bleeding  Extremities - No clubbing, cyanosis, edema    MEDS      pantoprazole (PROTONIX) 40 mg injection  40 mg IntraVENous Q12H    nadolol  20 mg Oral Daily    furosemide  40 mg IntraVENous Once    spironolactone  100 mg Oral Daily    furosemide  40 mg Oral Daily    sodium chloride flush  5-40 mL IntraVENous 2 times per day    atorvastatin  20 mg Oral Nightly    [Held by provider] FLUoxetine  20 mg Oral Daily    sodium chloride flush  5-40 mL IntraVENous BID    rifaximin  550 mg Oral BID    lactulose  20 g Oral TID      sodium chloride      sodium chloride      sodium chloride       morphine, sodium chloride, oxyCODONE, potassium chloride **OR** potassium alternative oral replacement **OR** potassium chloride, magnesium sulfate, sodium chloride, sodium chloride flush, sodium chloride, ondansetron **OR** ondansetron, polyethylene glycol, midodrine    LABS   CBC   Recent Labs     04/19/22  0450   WBC 8.7   HGB 9.9*   HCT 30.4*   MCV 88.4   PLT 83*     BMP:   Lab Results   Component Value Date     04/19/2022    K 3.7 04/19/2022     04/19/2022    CO2 22 04/19/2022    BUN 3 04/19/2022    LABALBU 2.9 04/15/2022    LABALBU 4.7 04/19/2012    CREATININE <0.40 04/19/2022    CALCIUM 7.7 04/19/2022    GFRAA Can not be calculated 04/19/2022    LABGLOM Can not be calculated 04/19/2022     ABGs:No results found for: PHART, PO2ART, YLI9CVP No results found for: IFIO2, MODE, SETTIDVOL, SETPEEP  Ionized Calcium:  No results found for: IONCA  Magnesium:    Lab Results   Component Value Date    MG 2.1 04/16/2022     Phosphorus:    Lab Results   Component Value Date    PHOS 3.9 04/14/2022        LIVER PROFILE No results for input(s): AST, ALT, LIPASE, BILIDIR, BILITOT, ALKPHOS in the last 72 hours. Invalid input(s): AMYLASE,  ALB  INR No results for input(s): INR in the last 72 hours. PTT   Lab Results   Component Value Date    APTT 34.8 04/13/2022         RADIOLOGY     (See actual reports for details)    ASSESSMENT/PLAN   Principal Problem:    Gastrointestinal hemorrhage with melena  Active Problems:    GERD (gastroesophageal reflux disease)    Hepatic cirrhosis (HCC)    Hepatitis C virus infection resolved after antiviral drug therapy    Malignant neoplasm of lower third of esophagus ([facility]    Spinal stenosis of lumbar region with neurogenic claudication    Former smoker, 50+ pack years, quit 2016    Esophageal adenocarcinoma ([facility] [address]    Anemia    Acute kidney injury ([facility] [address]  Resolved Problems:    * No resolved hospital problems. *    Plan  Hemoglobin stable  Continue follow up with GI  Further therapeutic paracentesis will need to be coordinated bt/GI and IR  Continue lasix outpatient  Plan for discharge to [facility] tomorrow      Electronically signed by PAMELA Rosenberg on 4/19/2022 at 12:44 PM    Patient seen and examined independently by me. Above discussed and I agree with medical student note except where indicated in the EMR revision history. Also see my additional comments and changes indicated by discrete font, text color, italics, and/or initials. Labs, cultures, and radiographs where available were reviewed. He was extremely short of breath because he went to the bathroom without asking help from the nurses. However, his pulse oximetry is normal.  I would continue to try to diurese him. Agree with increasing the Aldactone. May need higher dose of Lasix as well.   Much of his dyspnea is secondary to deconditioning  Electronically signed by Kiley Jimenez MD on 4/19/2022 at 2:40 PM

## 2022-04-19 NOTE — CARE COORDINATION
ONGOING DISCHARGE PLAN:    Patient is alert and oriented x4. Spoke with patient regarding discharge plan and patient confirms that plan is still to discharge to a facility   HBG is 9.9  Protonix 40 mg BID  Will need a pre cert started    Will continue to follow for additional discharge needs.     Electronically signed by Kenisha Madrid RN on 4/19/2022 at 1:49 PM

## 2022-04-19 NOTE — CARE COORDINATION
MARGARITA spoke with Misael Cabral at [facility], still awaiting precert for patient. SW to continue to assist with d/c needs.

## 2022-04-19 NOTE — PROGRESS NOTES
Physical Therapy  DATE: 2022    NAME: Abbey Sanchez  MRN: [id number]   : 1960    Patient not seen this date for Physical Therapy due to:    [] Cancel by RN or physician due to:    [] Hemodialysis    [] Critical Lab Value Level     [] Blood transfusion in progress    [] Acute or unstable cardiovascular status   _MAP < 55 or more than >115  _HR < 40 or > 130    [] Acute or unstable pulmonary status   -FiO2 > 60%   _RR < 5 or >40    _O2 sats < 85%    [] Strict Bedrest    [] Off Unit for surgery or procedure    [] Off Unit for testing       [] Pending imaging to R/O fracture    [x] Refusal by Patient: pt declining tx at this time d/t fatigue and just returning to bed, will follow up next session. [] Other      [] PT being discontinued at this time. Patient independent. No further needs. [] PT being discontinued at this time as the patient has been transferred to hospice care. No further needs.       Yohannes Led, PTA

## 2022-04-19 NOTE — PROGRESS NOTES
Pt up to [facility]. Increased SOB after getting back to bed. No c/o chest pain. Pulse ox 99% on RA. No cough noted. After a few moments, SOB subsided.  Pt without acute distress at this time

## 2022-04-19 NOTE — PROGRESS NOTES
Today's Date: 4/19/2022  Patient Name: Antonella Grant  Date of admission: 4/13/2022 10:10 AM  Patient's age: 57 y.o., 1960  Admission Dx: Severe anemia [D64.9]  Anemia, unspecified type [D64.9]    Requesting Physician: Ravi Castillo MD    CHIEF COMPLAINT: Excessive weakness and fatigue. Severe anemia. Esophageal cancer. SUBJECTIVE:  Patient was seen and examined. Hb stable  Plts are stable   No active bleeding  ++fatigue      BRIEF CASE HISTORY:    The patient is a 57 y.o.  male who is admitted to the hospital for further management of severe anemia. Patient presents with extreme weakness and fatigue. Symptoms started about 3 to 4 days ago. He has no dizziness. He has shortness of breath on minimal exertion. No abdominal pain. No chest pain. No difficulty swallowing. No nausea or vomiting. Patient denies any melena or hematochezia. No hematemesis. The patient is known to our practice. 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 Patient received chemoradiation with very good results. Patient's labs showed severe anemia with hemoglobin of 3.6. He was hospitalized with received blood transfusion. He is slightly better. Patient has no other complaints with treatment plan. No fever or signs of infection. No respiratory distress.     Past Medical History:   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 ([address] Anxiety, Arthritis, Back pain, chronic, Lezama esophagus, BPH (benign prostatic hypertrophy), Cholelithiasis, Cirrhosis ([address] COVID-19, COVID-19 vaccine series completed, DDD (degenerative disc disease), lumbar, Depression, Esophageal cancer ([address] Esophageal varices ([address] Fatty liver, GERD (gastroesophageal reflux disease), Hep C w/o coma, chronic ([address] History of alcohol abuse, History of blood transfusion, History of colon polyps, History of tobacco abuse, [place] (hard of hearing), Hyperlipidemia, Hypertension, Port-A-Cath in place, Sciatica, Spinal stenosis, Stomach ulcer, Tubular adenoma of colon, Vitamin D deficiency, and Wears glasses. Past Surgical History:   has a past surgical history that includes Bunionectomy; Nasal septum surgery; other surgical history (01/04/16); Colonoscopy; Colonoscopy (10/05/2016); other surgical history (11/21/2016); other surgical history (12/19/2016); knee surgery (Left); Bunionectomy (Left); Endoscopy, colon, diagnostic; pr revise median n/carpal tunnel surg (Right, 8/29/2017); Carpal tunnel release (Right); pr revise median n/carpal tunnel surg (Left, 10/31/2017); Colonoscopy (N/A, 3/30/2018); Colonoscopy (03/30/2018); pr njx aa&/strd tfrml epi lumbar/sacral 1 level (Bilateral, 9/6/2018); other surgical history (09/28/2018); pr njx aa&/strd tfrml epi lumbar/sacral 1 level (N/A, 9/28/2018); Pain management procedure (Left, 7/9/2020); Pain management procedure (Left, 7/20/2020); Pain management procedure (Bilateral, 8/17/2020); other surgical history (Right, 11/23/2020); Nerve Block (Right, 11/23/2020); Pain management procedure (Bilateral, 12/7/2020); Upper gastrointestinal endoscopy (N/A, 12/29/2020); Upper gastrointestinal endoscopy (N/A, 2/2/2021); Upper gastrointestinal endoscopy (N/A, 2/12/2021); Upper gastrointestinal endoscopy (2/12/2021); Cerulean tooth extraction; IR PORT PLACEMENT > 5 YEARS (4/19/2021); Upper gastrointestinal endoscopy (N/A, 8/31/2021); Upper gastrointestinal endoscopy (N/A, 1/21/2022); Upper gastrointestinal endoscopy (N/A, 4/15/2022); and Colonoscopy (N/A, 4/16/2022). Family History: family history includes Asthma in his brother; Cancer in his father and mother; Diabetes in his sister. Social History:   reports that he quit smoking about 5 years ago. He has a 45.00 pack-year smoking history. He has never used smokeless tobacco. He reports previous alcohol use. He reports previous drug use. Frequency: 1.00 time per week.    Medications: Prior to Admission medications    Medication Sig Start Date End Date Taking? Authorizing Provider   cyclobenzaprine (FLEXERIL) 10 MG tablet Take 10 mg by mouth daily as needed for Muscle spasms   Yes Historical Provider, MD   oxyCODONE-acetaminophen (PERCOCET) 5-325 MG per tablet Take 1 tablet by mouth daily as needed for Pain for up to 30 days.  4/6/22 5/6/22  MASSIMO Matias CNP   furosemide (LASIX) 20 MG tablet take 1 tablet by mouth once daily 4/4/22   MASSIMO Street NP   nadolol (CORGARD) 20 MG tablet Take 1 tablet by mouth daily 2/8/22   Laurita Kendall MD   ferrous sulfate (IRON 325) 325 (65 Fe) MG tablet Take 1 tablet by mouth 2 times daily 1/12/22   VASU Alexander   omeprazole (PRILOSEC) 40 MG delayed release capsule take 1 capsule by mouth EVERY MORNING BEFORE BREAKFAST 11/12/21   Karissa Shaw MD   FLUoxetine (PROZAC) 20 MG capsule take 1 capsule by mouth once daily 11/12/21   VASU Alexander   lidocaine-prilocaine (EMLA) 2.5-2.5 % cream Apply topically 45-60 mins prior to needle poke daily PRN 4/22/21   Karissa Shaw MD   atorvastatin (LIPITOR) 20 MG tablet take 1 tablet by mouth at bedtime 2/23/21   MASSIMO Muir CNP   spironolactone (ALDACTONE) 50 MG tablet take 1 tablet by mouth once daily 2/23/21   MASSIMO Muir CNP     Current Facility-Administered Medications   Medication Dose Route Frequency Provider Last Rate Last Admin    pantoprazole (PROTONIX) 40 mg in sodium chloride (PF) 10 mL injection  40 mg IntraVENous Q12H Eri Cano MD   40 mg at 04/19/22 0901    nadolol (CORGARD) tablet 20 mg  20 mg Oral Daily Gab Martinez MD   20 mg at 04/19/22 0901    furosemide (LASIX) injection 40 mg  40 mg IntraVENous Once Beryl Sharma MD        morphine (PF) injection 2 mg  2 mg IntraVENous Q4H PRN Beryl Sharma MD   2 mg at 04/19/22 0210    spironolactone (ALDACTONE) tablet 100 mg  100 mg Oral Daily Laurita Kendall MD   100 mg at 04/19/22 0902    furosemide (LASIX) tablet 40 mg  40 mg Oral Daily Rheba Almaz, APRN - NP   40 mg at 04/19/22 0901    0.9 % sodium chloride infusion   IntraVENous PRN Claudia Ortiz MD        oxyCODONE (ROXICODONE) immediate release tablet 5 mg  5 mg Oral Q6H PRN Aregntina Man MD   5 mg at 04/18/22 0238    potassium chloride (KLOR-CON M) extended release tablet 40 mEq  40 mEq Oral PRN Claudia Ortiz MD   40 mEq at 04/16/22 1445    Or    potassium bicarb-citric acid (EFFER-K) effervescent tablet 40 mEq  40 mEq Oral PRN Claudia Ortiz MD        Or    potassium chloride 10 mEq/100 mL IVPB (Peripheral Line)  10 mEq IntraVENous PRN Claudia Ortiz MD        magnesium sulfate 1000 mg in dextrose 5% 100 mL IVPB  1,000 mg IntraVENous PRN Claudia Ortiz MD        0.9 % sodium chloride infusion   IntraVENous PRN Claudia Ortiz MD        sodium chloride flush 0.9 % injection 5-40 mL  5-40 mL IntraVENous 2 times per day Claudia Ortiz MD   10 mL at 04/19/22 0903    sodium chloride flush 0.9 % injection 5-40 mL  5-40 mL IntraVENous PRN Claudia Ortiz MD   10 mL at 04/18/22 1610    0.9 % sodium chloride infusion  25 mL IntraVENous PRN Claudia Ortiz MD        ondansetron (ZOFRAN-ODT) disintegrating tablet 4 mg  4 mg Oral Q8H PRN Claudia Ortiz MD        Or    ondansetron (ZOFRAN) injection 4 mg  4 mg IntraVENous Q6H PRN Claudia Ortiz MD   4 mg at 04/14/22 0117    polyethylene glycol (GLYCOLAX) packet 17 g  17 g Oral Daily PRN Claudia Ortiz MD        midodrine (PROAMATINE) tablet 5 mg  5 mg Oral TID PRN Claudia Ortiz MD   5 mg at 04/19/22 0900    atorvastatin (LIPITOR) tablet 20 mg  20 mg Oral Nightly Augusto Cordova MD   20 mg at 04/18/22 2013    [Held by provider] FLUoxetine (PROZAC) capsule 20 mg  20 mg Oral Daily Lucian Harley MD   20 mg at 04/15/22 0753    sodium chloride flush 0.9 % injection 5-40 mL  5-40 mL (24hrs), Av.9 °F (36.6 °C), Min:97.7 °F (36.5 °C), Max:98.2 °F (36.8 °C)      General appearance - not in pain or distress. Looks pale. Mental status - alert and oriented  Eyes - pupils equal and reactive, extraocular eye movements intact  Ears - bilateral TM's and external ear canals normal  Nose - normal and patent, no erythema, discharge or polyps  Mouth - mucous membranes moist, pharynx normal without lesions  Neck - supple, no significant adenopathy  Lymphatics - no palpable lymphadenopathy, no hepatosplenomegaly  Chest - clear to auscultation, no wheezes, rales or rhonchi, symmetric air entry  Heart - normal rate, regular rhythm, normal S1, S2, no murmurs, rubs, clicks or gallops  Abdomen - soft, nontender, nondistended, no masses or organomegaly  Neurological - alert, oriented, normal speech, no focal findings or movement disorder noted  Musculoskeletal - no joint tenderness, deformity or swelling  Extremities - peripheral pulses normal, no pedal edema, no clubbing or cyanosis  Skin - normal coloration and turgor, no rashes, no suspicious skin lesions noted           DATA:      Labs:       CBC:   Recent Labs     22  0410 22  0450   WBC 7.5 8.7   HGB 9.3* 9.9*   HCT 29.1* 30.4*   PLT 78* 83*     BMP:   Recent Labs     22  0410 22  0450   * 132*   K 4.2 3.7   CO2 21 22   BUN 4* 3*   CREATININE <0.40* <0.40*   LABGLOM Can not be calculated Can not be calculated   GLUCOSE 132* 120*     PT/INR:   No results for input(s): PROTIME, INR in the last 72 hours. APTT:  No results for input(s): APTT in the last 72 hours. LIVER PROFILE:  No results for input(s): AST, ALT, LABALBU in the last 72 hours. US LIVER  Narrative: EXAMINATION:  RIGHT UPPER QUADRANT ULTRASOUND    2022 9:24 am    COMPARISON:  None.     HISTORY:  ORDERING SYSTEM PROVIDED HISTORY: evaluate portal/hepatic vein, r/o PVT  TECHNOLOGIST PROVIDED HISTORY:    evaluate portal/hepatic vein, r/o PVT    FINDINGS:  LIVER: Nodular margins of the liver would suggest hepatic cirrhosis. No  focal lesion. Liver 15.6 cm in length. Hepatopetal flow portal vein. BILIARY SYSTEM:  Gallstones and sludge within the gallbladder. No wall  thickening. Negative sonographic Silva's sign. Common bile duct is within normal limits measuring 4.6 mm.    OTHER: Moderate ascites visualized right upper quadrant. Impression: 1. Hepatic cirrhosis. No focal mass. Hepatopetal flow portal vein. 2. Gallstones and sludge within the gallbladder. 3. Ascites    RECOMMENDATIONS:  Unavailable  IMPRESSION:    Primary Problem  Gastrointestinal hemorrhage with melena    Active Hospital Problems    Diagnosis Date Noted    Anemia [D64.9] 04/13/2022    Acute kidney injury ([address] [N17.9] 04/13/2022    Esophageal adenocarcinoma ([address] [C15.9]     Former smoker, 50+ pack years, quit 2016 [Z87.891] 12/20/2021    Spinal stenosis of lumbar region with neurogenic claudication [M48.062] 12/14/2021    Malignant neoplasm of lower third of esophagus ([address] [C15.5]     Gastrointestinal hemorrhage with melena [K92.1]     Hepatitis C virus infection resolved after antiviral drug therapy [Z86.19] 12/23/2020    Hepatic cirrhosis ([address] [K74.60] 09/15/2016    GERD (gastroesophageal reflux disease) [K21.9] 04/19/2012       RECOMMENDATIONS:  1. Records and labs and images were reviewed and discussed with the patient. 2. Patient had esophageal cancer of the lower third of the esophagus. Status post chemoradiation with good results. 3. Current presentation with severe anemia status post transfusion and iron infusion  4. Thrombocytopenia, chronic likely from his liver disease with acute decrease and peripheral blood smear did not show any microangiopathic picture or symptoms suggestive of DIC. Due to monitor platelets at this time  5. Appreciate GI input. 6.  We will continue to transfuse to maintain hemoglobin above 7   7. I will continue  IV iron infusion.   8. We will follow      Discussed with patient and Nurse. Garrison Leo MD  Hematologist/Medical Oncologist    Cell: [phone]            This note is created with the assistance of a speech recognition program.  While intending to generate a document that actually reflects the content of the visit, the document can still have some errors including those of syntax and sound a like substitutions which may escape proof reading. It such instances, actual meaning can be extrapolated by contextual diversion.

## 2022-04-19 NOTE — PROGRESS NOTES
[id number]    PROGRESS NOTE             4/19/2022    9:57 AM    Name:   Walt Antunez  MRN:     [id number]     Acct:      [de-identified]   Room:   2115/2115-01  IP Day:  6  Admit Date:  4/13/2022 10:10 AM    PCP:  VASU Garnett  Code Status:  Full Code    Subjective:     C/C:   Chief Complaint   Patient presents with    Shortness of Breath     Interval History Status: improved. Patient doing well, abdomen still distended and contributing to some shortness of breath which is positional. Denies dark/bloody stool. States he has been getting headaches throughout this admission which is atypical for him; does not usually get headaches at home. Likely due to interrupted sleep while in the hospital. Otherwise, has no concerns. Is tolerating regular diet well. Brief History:     The patient is a 58 y.o.  Non- / non  male with PMH of alcoholic liver disease/cirrhosis, history of hepatitis C (treated).  He is a former smoker with 50+ pack year history, and admits to former heavy alcohol use.  Denies illicit or IV drug use.  Patient also has a history of GE junction adenocarcinoma s/p chemoradiation completed 6/9/21 and PET 7/23/21 with no recurrence, as well as most recent EGD on 1/21/22 which was negative for malignanacy.  This EGD also showed severe portal hypertensive gastropathy with some oozing of blood visualized.     He presented to the emergency department today from pain management clinic where he was scheduled to have an epidural steroid injection for back pain due to spinal stenosis.  However, he was unable to lie prone due to abdominal distention and shortness of breath and he was sent to the emergency department.      According to the patient, he was scheduled for therapeutic paracentesis today at [facility] this afternoon.  His most recent paracentesis was 4/5/2022 with 7.5 L removed.  Per patient, he states that nausea and vomiting. Neurological: Positive for headaches. Negative for dizziness. Psychiatric/Behavioral: Positive for sleep disturbance (interrupted sleep). All other systems reviewed and are negative. Medications: Allergies:     Allergies   Allergen Reactions    Bee Pollen Other (See Comments)     Runny nose, watery eyes    Pollen Extract      Runny nose, watery eyes       Current Meds:   Scheduled Meds:    pantoprazole (PROTONIX) 40 mg injection  40 mg IntraVENous Q12H    nadolol  20 mg Oral Daily    furosemide  40 mg IntraVENous Once    spironolactone  100 mg Oral Daily    furosemide  40 mg Oral Daily    sodium chloride flush  5-40 mL IntraVENous 2 times per day    atorvastatin  20 mg Oral Nightly    [Held by provider] FLUoxetine  20 mg Oral Daily    sodium chloride flush  5-40 mL IntraVENous BID    rifaximin  550 mg Oral BID    lactulose  20 g Oral TID     Continuous Infusions:    sodium chloride      sodium chloride      sodium chloride       PRN Meds: morphine, sodium chloride, oxyCODONE, potassium chloride **OR** potassium alternative oral replacement **OR** potassium chloride, magnesium sulfate, sodium chloride, sodium chloride flush, sodium chloride, ondansetron **OR** ondansetron, polyethylene glycol, midodrine    Data:     Past Medical History:   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 ([facility] Anxiety, Arthritis, Back pain, chronic, Le[place]ama esophagus, BPH (benign prostatic hypertrophy), Cholelithiasis, Cirrhosis ([facility] COVID-19, COVID-19 vaccine series completed, DDD (degenerative disc disease), lumbar, Depression, Esophageal cancer ([facility] Esophageal varices ([facility] Fatty liver, GERD (gastroesophageal reflux disease), Hep C w/o coma, chronic ([facility] History of alcohol abuse, History of blood transfusion, History of colon polyps, History of tobacco abuse, [place] (hard of hearing), Hyperlipidemia, Hypertension, Port-A-Cath in place, Sciatica, Spinal stenosis, Stomach ulcer, Tubular adenoma of colon, Vitamin D deficiency, and Wears glasses. Social History:   reports that he quit smoking about 5 years ago. He has a 45.00 pack-year smoking history. He has never used smokeless tobacco. He reports previous alcohol use. He reports previous drug use. Frequency: 1.00 time per week. Family History:   Family History   Problem Relation Age of Onset   Unk Марина Cancer Mother         pancreatic    Cancer Father         bone    Diabetes Sister     Asthma Brother        Vitals:  BP (!) 105/57   Pulse 72   Temp 97.7 °F (36.5 °C) (Oral)   Resp 18   Ht 5' 10\" (1.778 m)   Wt 228 lb 6.3 oz (103.6 kg)   SpO2 97%   BMI 32.77 kg/m²   Temp (24hrs), Av.9 °F (36.6 °C), Min:97.7 °F (36.5 °C), Max:98.2 °F (36.8 °C)    No results for input(s): POCGLU in the last 72 hours. I/O(24Hr):     Intake/Output Summary (Last 24 hours) at 2022 0957  Last data filed at 2022 0904  Gross per 24 hour   Intake 2539.41 ml   Output 4800 ml   Net -2260.59 ml       Labs:    CBC:   Lab Results   Component Value Date    WBC 8.7 2022    RBC 3.45 2022    RBC 4.75 2012    HGB 9.9 2022    HCT 30.4 2022    MCV 88.4 2022    MCH 28.7 2022    MCHC 32.4 2022    RDW 19.6 2022    PLT 83 2022     2012    MPV 7.5 2022     BMP:    Lab Results   Component Value Date     2022    K 3.7 2022     2022    CO2 22 2022    BUN 3 2022    LABALBU 2.9 04/15/2022    LABALBU 4.7 2012    CREATININE <0.40 2022    CALCIUM 7.7 2022    GFRAA Can not be calculated 2022    LABGLOM Can not be calculated 2022    GLUCOSE 120 2022    GLUCOSE 65 2012         Radiology:    XR CHEST PORTABLE    Result Date: 4/15/2022  EXAMINATION: ONE XRAY VIEW OF THE CHEST 4/15/2022 7:59 am COMPARISON: 2022, 2021 HISTORY: ORDERING SYSTEM PROVIDED HISTORY: Dyspnea TECHNOLOGIST PROVIDED HISTORY: Dyspnea Reason for Exam: Dyspnea FINDINGS: Right chest port terminates in the superior vena cava. Mild pulmonary vascular congestion. No focal pulmonary opacities. Calcified granulomata and calcified mediastinal nodes from prior granulomatous infection. Cardiomegaly which is similar to 4 months prior. No acute bony findings. Mild pulmonary vascular congestion. Cardiomegaly. IR US GUIDED PARACENTESIS    Result Date: 4/18/2022  PROCEDURE: PARACENTESIS WITH IMAGE GUIDANCE US ABDOMEN LIMITED 4/18/2022 HISTORY: ORDERING SYSTEM PROVIDED HISTORY: worsening acites and SOB TECHNOLOGIST PROVIDED HISTORY: worsening acites and SOB Please remove no more than 5 L TECHNIQUE: Informed consent was obtained after a detailed explanation of the procedure including risks, benefits, and alternatives. Universal protocol was followed. A limited ultrasound of the abdomen was performed and shows large amount of ascites. The right abdomen was prepped and draped in sterile fashion and local anesthesia was achieved with lidocaine. A 5 Danish needle sheath was advanced into ascites using realtime ultrasound guidance and paracentesis was performed. The patient tolerated the procedure well. EBL: None FINDINGS: Limited ultrasound of the abdomen demonstrates ascites. A total of 4.8 L of clear yellow fluid was removed. Successful ultrasound guided paracentesis. IR US GUIDED PARACENTESIS    Result Date: 4/14/2022  PROCEDURE: PARACENTESIS WITH IMAGE GUIDANCE US ABDOMEN LIMITED 4/14/2022 HISTORY: ORDERING SYSTEM PROVIDED HISTORY: ascites remove 5 liters only TECHNOLOGIST PROVIDED HISTORY: ascites remove 5 liters only TECHNIQUE: Informed consent was obtained after a detailed explanation of the procedure including risks, benefits, and alternatives. Universal protocol was followed. A limited ultrasound of the abdomen was performed and shows a moderate to large amount of ascites.   The right abdomen was prepped and draped in sterile fashion and local anesthesia was achieved with lidocaine. A 5 Indonesian needle sheath was advanced into ascites using realtime ultrasound guidance and paracentesis was performed. The patient tolerated the procedure well. EBL: None FINDINGS: Limited ultrasound of the abdomen demonstrates ascites. A total of 5 L of slightly turbid yellow colored fluid was removed. Additional fluid remains on completion. Successful ultrasound-guided paracentesis. IR US GUIDED PARACENTESIS    Result Date: 4/5/2022  PROCEDURE: IR US GUIDED PARACENTESIS W IMAGING 4/5/2022 HISTORY: ORDERING SYSTEM PROVIDED HISTORY: Ascites due to alcoholic cirrhosis (HCC) TECHNIQUE: Sonography was utilized CONTRAST: None SEDATION: None FLUOROSCOPY DOSE AND TYPE OR TIME AND EXPOSURES: None DESCRIPTION OF PROCEDURE: Informed consent was obtained after a detailed explanation of the procedure including risks, benefits, and alternatives. Universal protocol was observed. Preprocedure ultrasound shows a dominant fluid pocket in the right lowerquadrant. Using ultrasound guidance (image attached to the medical record), the fluid pocket was accessed with a 5 [place] Lorrie Yueh needle with aspiration of inch clear yellow fluid. Vacuum bottle paracentesis was performed and approximately 7.25 L was removed. the patient tolerated the procedure well and left the department in good condition. Dr. Balaji Hernandez was present. Patient received IV albumin replacement. FINDINGS: 7.25 L drained     Successful ultrasound-guided therapeutic paracentesis         Physical Examination:        Physical Exam  Constitutional:       General: He is not in acute distress. Appearance: Normal appearance. He is not ill-appearing. Comments: Sleeping, lying comfortably in bed   Eyes:      General: No scleral icterus. Extraocular Movements: Extraocular movements intact.       Conjunctiva/sclera: Conjunctivae normal.   Cardiovascular:      Rate and Rhythm: Normal rate and regular rhythm. Pulmonary:      Effort: Pulmonary effort is normal. No respiratory distress. Breath sounds: Normal breath sounds. No wheezing, rhonchi or rales. Abdominal:      General: Bowel sounds are normal. There is distension. Palpations: Abdomen is soft. Tenderness: There is no abdominal tenderness. Musculoskeletal:      Right lower leg: No edema. Left lower leg: No edema. Skin:     Coloration: Skin is jaundiced. Neurological:      General: No focal deficit present. Mental Status: He is alert and oriented to person, place, and time.    Psychiatric:         Mood and Affect: Mood normal.         Behavior: Behavior normal.         Assessment:        Primary Problem  Gastrointestinal hemorrhage with melena    Active Hospital Problems    Diagnosis Date Noted    Anemia [D64.9] 04/13/2022    Acute kidney injury ([facility] [N17.9] 04/13/2022    Esophageal adenocarcinoma ([facility] [C15.9]     Former smoker, 50+ pack years, quit 2016 [Z87.891] 12/20/2021    Spinal stenosis of lumbar region with neurogenic claudication [M48.062] 12/14/2021    Malignant neoplasm of lower third of esophagus ([facility] [C15.5]     Gastrointestinal hemorrhage with melena [K92.1]     Hepatitis C virus infection resolved after antiviral drug therapy [Z86.19] 12/23/2020    Hepatic cirrhosis ([facility] [K74.60] 09/15/2016    GERD (gastroesophageal reflux disease) [K21.9] 04/19/2012       Plan:        Severe anemia in the setting of cirrhosis and portal hypertension, likely secondary to GI bleed, stable   -Hemoglobin 3.5 on admission, 9.9 this morning  -Has received 7 units PRBCs and 1 unit FFP total since admission (+1 unit in ED)  -Protonix drip switched to 40mg IV BID  -FOBT positive and pt was having melena/hematochezia; no longer appears to be having bloody bowel movements  -EGD and colonoscopy did not reveal source of active bleed  -Monitor hgb, transfuse if <7  -GI is following     Thrombocytopenia, stable  -platelets 508 on admission  -Has received 7 units of blood, 1 unit FFP  -Platelets this morning 83, stabilizing, continue to monitor  -Likely 2/2 liver disease  -does not appear to be bleeding currently, hgb stable      Hyponatremia, stable  -Sodium 120 on admission, 132 this AM  -Pt is not chronically hyponatremic  -Patient is likely intravascularly volume depleted 2/2 third spacing from ascites  -continue to monitor     Ascites 2/2 cirrhotic liver disease  -cirrhosis 2/2 heavy alcohol use in the past; patient also has history of hepatitis C s/p treatment  -Patient had IR guided paracentesis on 4/5/2022 and had rapid reaccumulation of ascitic fluid during this time; was scheduled for paracentesis on day of admission  -s/p IR guided paracentesis 4/14; 5L max removed per GI recommendations, fluid has reaccumulated   -IR guided paracentesis also done 4/18; 4.8L removed  -Plan for liver u/s to evaluate portal vein today, per GI recommendation  -Will need outpatient GI to coordinate need for regular therapeutic paracentesis     Acute kidney injury, resolved  -pt does not have history of CKD or hepatorenal syndrome  -Creatinine 1.42 on admission, wnl now  -FeUrea 19.5% indicative of prerenal injury     Hypokalemia, resolved, continue to monitor and replace per protocol as needed     Portal hypertension, with findings of portal hypertensive gastropathy on EGD, held home lasix, nadolol, and spironolactone d/t hypotension     History of GE junction adenocarcinoma, in remission  -pt completed chemoradiation 6/9/2021; PET scan 7/23/21 showed no recurrence  -Most recent EGD done 1/21/22: flat 3-4mm polyp at GE junction which showed no malignancy  -Polypoid lesion seen on EGD 4/15, not biopsied because of bleeding risk, will need outpatient follow up     GI PPx: Protonix drip  DVT prophylaxis: SCD cuffs, no chemical anticoagulation at this time due to severe anemia and possible active bleed  Diet: regular diet, low sodium      Quin Barton MD  4/19/2022  9:57 AM     Attending Physician Statement  I have discussed the care of Dino Reyes and I have examined the patient myselft and taken ros and hpi , including pertinent history and exam findings,  with the resident. I have reviewed the key elements of all parts of the encounter with the resident. I agree with the assessment, plan and orders as documented by the resident. Spent 35 minutes in reviewing data/medicines/talking to patient/family,  explaining and answering all the questions.     Severe anemia,  Alcoholic cirrhosis,  Portal hypertension,  Acute GI bleed, admitted with a hemoglobin of 3.5, status post replacement of 7 units, check ferritin levels,  Thrombocytopenia,  Hyponatremia chronic sinusitis, status post paracentesis, repeat ordered by IR,  Acute kidney injury, hepatorenal syndrome,  Hypokalemia,  History of GE junction adenocarcinoma in remission as per hematology oncology, work-up under progress,  Overall prognosis very poor,    Electronically signed by Tianna Gomez MD

## 2022-04-20 LAB
ANION GAP SERPL CALCULATED.3IONS-SCNC: 10 MMOL/L (ref 9–17)
BUN BLDV-MCNC: 6 MG/DL (ref 8–23)
CALCIUM SERPL-MCNC: 7.5 MG/DL (ref 8.6–10.4)
CHLORIDE BLD-SCNC: 101 MMOL/L (ref 98–107)
CO2: 21 MMOL/L (ref 20–31)
CREAT SERPL-MCNC: <0.4 MG/DL (ref 0.7–1.2)
CULTURE: ABNORMAL
DIRECT EXAM: ABNORMAL
GFR AFRICAN AMERICAN: ABNORMAL ML/MIN
GFR NON-AFRICAN AMERICAN: ABNORMAL ML/MIN
GFR SERPL CREATININE-BSD FRML MDRD: ABNORMAL ML/MIN/{1.73_M2}
GLUCOSE BLD-MCNC: 107 MG/DL (ref 70–99)
HCT VFR BLD CALC: 27.9 % (ref 41–53)
HEMOGLOBIN: 9 G/DL (ref 13.5–17.5)
MAGNESIUM: 1.3 MG/DL (ref 1.6–2.6)
MCH RBC QN AUTO: 28.3 PG (ref 26–34)
MCHC RBC AUTO-ENTMCNC: 32.3 G/DL (ref 31–37)
MCV RBC AUTO: 87.8 FL (ref 80–100)
PDW BLD-RTO: 24.4 % (ref 11.5–14.9)
PLATELET # BLD: 62 K/UL (ref 150–450)
PMV BLD AUTO: 7.7 FL (ref 6–12)
POTASSIUM SERPL-SCNC: 3.5 MMOL/L (ref 3.7–5.3)
RBC # BLD: 3.18 M/UL (ref 4.5–5.9)
SODIUM BLD-SCNC: 132 MMOL/L (ref 135–144)
SPECIMEN DESCRIPTION: ABNORMAL
WBC # BLD: 7.6 K/UL (ref 3.5–11)

## 2022-04-20 PROCEDURE — 85027 COMPLETE CBC AUTOMATED: CPT

## 2022-04-20 PROCEDURE — 2580000003 HC RX 258: Performed by: INTERNAL MEDICINE

## 2022-04-20 PROCEDURE — 6360000002 HC RX W HCPCS

## 2022-04-20 PROCEDURE — 36415 COLL VENOUS BLD VENIPUNCTURE: CPT

## 2022-04-20 PROCEDURE — 6370000000 HC RX 637 (ALT 250 FOR IP): Performed by: NURSE PRACTITIONER

## 2022-04-20 PROCEDURE — 6360000002 HC RX W HCPCS: Performed by: INTERNAL MEDICINE

## 2022-04-20 PROCEDURE — 99232 SBSQ HOSP IP/OBS MODERATE 35: CPT | Performed by: INTERNAL MEDICINE

## 2022-04-20 PROCEDURE — 2060000000 HC ICU INTERMEDIATE R&B

## 2022-04-20 PROCEDURE — 6370000000 HC RX 637 (ALT 250 FOR IP): Performed by: INTERNAL MEDICINE

## 2022-04-20 PROCEDURE — 80048 BASIC METABOLIC PNL TOTAL CA: CPT

## 2022-04-20 PROCEDURE — C9113 INJ PANTOPRAZOLE SODIUM, VIA: HCPCS

## 2022-04-20 PROCEDURE — A4216 STERILE WATER/SALINE, 10 ML: HCPCS

## 2022-04-20 PROCEDURE — 83735 ASSAY OF MAGNESIUM: CPT

## 2022-04-20 PROCEDURE — 6370000000 HC RX 637 (ALT 250 FOR IP): Performed by: STUDENT IN AN ORGANIZED HEALTH CARE EDUCATION/TRAINING PROGRAM

## 2022-04-20 PROCEDURE — 2580000003 HC RX 258

## 2022-04-20 PROCEDURE — APPSS30 APP SPLIT SHARED TIME 16-30 MINUTES: Performed by: NURSE PRACTITIONER

## 2022-04-20 RX ORDER — SPIRONOLACTONE 25 MG/1
50 TABLET ORAL EVERY EVENING
Status: DISCONTINUED | OUTPATIENT
Start: 2022-04-20 | End: 2022-04-21 | Stop reason: HOSPADM

## 2022-04-20 RX ORDER — FUROSEMIDE 40 MG/1
40 TABLET ORAL EVERY MORNING
Status: DISCONTINUED | OUTPATIENT
Start: 2022-04-21 | End: 2022-04-21 | Stop reason: HOSPADM

## 2022-04-20 RX ORDER — OXYCODONE HYDROCHLORIDE 5 MG/1
5 TABLET ORAL EVERY 6 HOURS PRN
Qty: 12 TABLET | Refills: 0 | Status: CANCELLED | OUTPATIENT
Start: 2022-04-20 | End: 2022-04-23

## 2022-04-20 RX ORDER — SPIRONOLACTONE 25 MG/1
100 TABLET ORAL EVERY MORNING
Status: DISCONTINUED | OUTPATIENT
Start: 2022-04-21 | End: 2022-04-21 | Stop reason: HOSPADM

## 2022-04-20 RX ORDER — FUROSEMIDE 20 MG/1
20 TABLET ORAL EVERY EVENING
Status: DISCONTINUED | OUTPATIENT
Start: 2022-04-20 | End: 2022-04-21 | Stop reason: HOSPADM

## 2022-04-20 RX ORDER — LANOLIN ALCOHOL/MO/W.PET/CERES
3 CREAM (GRAM) TOPICAL NIGHTLY PRN
Status: DISCONTINUED | OUTPATIENT
Start: 2022-04-20 | End: 2022-04-21 | Stop reason: HOSPADM

## 2022-04-20 RX ORDER — FUROSEMIDE 10 MG/ML
20 INJECTION INTRAMUSCULAR; INTRAVENOUS ONCE
Status: COMPLETED | OUTPATIENT
Start: 2022-04-20 | End: 2022-04-20

## 2022-04-20 RX ADMIN — ATORVASTATIN CALCIUM 20 MG: 20 TABLET, FILM COATED ORAL at 20:44

## 2022-04-20 RX ADMIN — RIFAXIMIN 550 MG: 550 TABLET ORAL at 11:12

## 2022-04-20 RX ADMIN — SODIUM CHLORIDE, PRESERVATIVE FREE 10 ML: 5 INJECTION INTRAVENOUS at 11:15

## 2022-04-20 RX ADMIN — NADOLOL 20 MG: 20 TABLET ORAL at 11:12

## 2022-04-20 RX ADMIN — SODIUM CHLORIDE, PRESERVATIVE FREE 10 ML: 5 INJECTION INTRAVENOUS at 00:25

## 2022-04-20 RX ADMIN — MAGNESIUM SULFATE HEPTAHYDRATE 1000 MG: 1 INJECTION, SOLUTION INTRAVENOUS at 07:14

## 2022-04-20 RX ADMIN — MAGNESIUM SULFATE HEPTAHYDRATE 1000 MG: 1 INJECTION, SOLUTION INTRAVENOUS at 14:08

## 2022-04-20 RX ADMIN — RIFAXIMIN 550 MG: 550 TABLET ORAL at 20:44

## 2022-04-20 RX ADMIN — MORPHINE SULFATE 2 MG: 2 INJECTION, SOLUTION INTRAMUSCULAR; INTRAVENOUS at 00:25

## 2022-04-20 RX ADMIN — MAGNESIUM SULFATE HEPTAHYDRATE 1000 MG: 1 INJECTION, SOLUTION INTRAVENOUS at 11:02

## 2022-04-20 RX ADMIN — POTASSIUM CHLORIDE 40 MEQ: 20 TABLET, EXTENDED RELEASE ORAL at 07:13

## 2022-04-20 RX ADMIN — SODIUM CHLORIDE, PRESERVATIVE FREE 40 MG: 5 INJECTION INTRAVENOUS at 11:14

## 2022-04-20 RX ADMIN — SODIUM CHLORIDE, PRESERVATIVE FREE 40 MG: 5 INJECTION INTRAVENOUS at 20:44

## 2022-04-20 RX ADMIN — MAGNESIUM SULFATE HEPTAHYDRATE 1000 MG: 1 INJECTION, SOLUTION INTRAVENOUS at 12:47

## 2022-04-20 RX ADMIN — SPIRONOLACTONE 50 MG: 25 TABLET, FILM COATED ORAL at 18:18

## 2022-04-20 RX ADMIN — FUROSEMIDE 40 MG: 40 TABLET ORAL at 11:20

## 2022-04-20 RX ADMIN — FLUOXETINE HYDROCHLORIDE 20 MG: 20 CAPSULE ORAL at 11:12

## 2022-04-20 RX ADMIN — SPIRONOLACTONE 100 MG: 25 TABLET, FILM COATED ORAL at 11:13

## 2022-04-20 RX ADMIN — LACTULOSE 20 G: 20 SOLUTION ORAL at 11:12

## 2022-04-20 RX ADMIN — FUROSEMIDE 20 MG: 10 INJECTION, SOLUTION INTRAMUSCULAR; INTRAVENOUS at 16:09

## 2022-04-20 RX ADMIN — SODIUM CHLORIDE, PRESERVATIVE FREE 10 ML: 5 INJECTION INTRAVENOUS at 11:11

## 2022-04-20 ASSESSMENT — ENCOUNTER SYMPTOMS
VOMITING: 0
ABDOMINAL PAIN: 1
ABDOMINAL DISTENTION: 1
NAUSEA: 1
SHORTNESS OF BREATH: 0

## 2022-04-20 ASSESSMENT — PAIN DESCRIPTION - LOCATION: LOCATION: HEAD

## 2022-04-20 ASSESSMENT — PAIN SCALES - GENERAL: PAINLEVEL_OUTOF10: 4

## 2022-04-20 ASSESSMENT — PAIN DESCRIPTION - ORIENTATION: ORIENTATION: MID;OTHER (COMMENT)

## 2022-04-20 NOTE — PLAN OF CARE
Problem: Falls - Risk of:  Goal: Will remain free from falls  Description: Will remain free from falls  4/20/2022 0353 by Haley Weems RN  Outcome: Met This Shift     Problem: Falls - Risk of:  Goal: Absence of physical injury  Description: Absence of physical injury  Outcome: Met This Shift     Problem: Activity:  Goal: Ability to tolerate increased activity will improve  Description: Ability to tolerate increased activity will improve  Outcome: Ongoing     Problem:  Bowel/Gastric:  Goal: Ability to achieve a regular elimination pattern will improve  Description: Ability to achieve a regular elimination pattern will improve  Outcome: Ongoing     Problem: Cardiac:  Goal: Ability to maintain an adequate cardiac output will improve  Description: Ability to maintain an adequate cardiac output will improve  Outcome: Ongoing     Problem: Coping:  Goal: Ability to adjust to condition or change in health will improve  Description: Ability to adjust to condition or change in health will improve  Outcome: Ongoing     Problem: Coping:  Goal: Communication of feelings regarding changes in body function or appearance will improve  Description: Communication of feelings regarding changes in body function or appearance will improve  Outcome: Ongoing     Problem: Sensory:  Goal: General experience of comfort will improve  Description: General experience of comfort will improve  Outcome: Ongoing

## 2022-04-20 NOTE — PROGRESS NOTES
Pulmonary Progress Note  [facility]      Patient - Rocael Ibrahim,  Age - 57 y.o.    - 1960      Room Number - 2115/2115-01   N -  [id number]   [facility]  Date of Admission -  2022 10:10 AM        Consulting Liborio Nickerson MD  Primary Care Physician - VASU Madrigal     SUBJECTIVE   States he is doing well but is tired. He does not c/o chest pain or any other pain. He is still getting SOB with movement and exertion. States he feels bloated. States he will be going to [facility] rehabilitation and has transportation for future therapeutic paracentesis. OBJECTIVE   VITALS    height is 5' 10\" (1.778 m) and weight is 228 lb 6.3 oz (103.6 kg). His oral temperature is 98.2 °F (36.8 °C). His blood pressure is 112/68 and his pulse is 67. His respiration is 18 and oxygen saturation is 98%. Body mass index is 32.77 kg/m². Temperature Range: Temp: 98.2 °F (36.8 °C) Temp  Av.8 °F (36.6 °C)  Min: 97.6 °F (36.4 °C)  Max: 98.2 °F (36.8 °C)  BP Range:  Systolic (11HIL), QOY:344 , Min:100 , ZYL:081     Diastolic (45YFB), IFT:00, Min:61, Max:68    Pulse Range: Pulse  Av.7  Min: 61  Max: 67  Respiration Range: Resp  Av.5  Min: 18  Max: 20  Current Pulse Ox[de-identified]  SpO2: 98 %  24HR Pulse Ox Range:  SpO2  Av.7 %  Min: 92 %  Max: 100 %  Oxygen Amount and Delivery: O2 Flow Rate (L/min): 6 L/min    Wt Readings from Last 3 Encounters:   22 228 lb 6.3 oz (103.6 kg)   22 210 lb (95.3 kg)   22 198 lb (89.8 kg)       I/O (24 Hours)    Intake/Output Summary (Last 24 hours) at 2022 1347  Last data filed at 2022 0445  Gross per 24 hour   Intake 460 ml   Output 950 ml   Net -490 ml       EXAM     General Appearance  Awake, alert, oriented, in no acute distress  HEENT - normocephalic, atraumatic.  []  Mallampati  [] Crowded airway   [] Macroglossia  []  Retrognathia  [] Micrognathia  []  Normal tongue size []  Normal Bite  [] Calvert sign positive    Lungs -  CTA. No wheezes, rales, rhonchi. No increased WOB.    Cardiovascular - Heart sounds are normal.  Regular rate and rhythm   Abdomen - Full but soft, nontender, no masses or organomegaly  Neurologic - There are no focal motor or sensory deficits  Skin - No bruising or bleeding  Extremities - No clubbing, cyanosis, edema    MEDS      pantoprazole (PROTONIX) 40 mg injection  40 mg IntraVENous Q12H    nadolol  20 mg Oral Daily    furosemide  40 mg IntraVENous Once    spironolactone  100 mg Oral Daily    furosemide  40 mg Oral Daily    sodium chloride flush  5-40 mL IntraVENous 2 times per day    atorvastatin  20 mg Oral Nightly    FLUoxetine  20 mg Oral Daily    sodium chloride flush  5-40 mL IntraVENous BID    rifAXIMin  550 mg Oral BID    lactulose  20 g Oral TID      sodium chloride      sodium chloride      sodium chloride       morphine, sodium chloride, oxyCODONE, potassium chloride **OR** potassium alternative oral replacement **OR** potassium chloride, magnesium sulfate, sodium chloride, sodium chloride flush, sodium chloride, ondansetron **OR** ondansetron, polyethylene glycol, midodrine    LABS   CBC   Recent Labs     04/20/22  0517   WBC 7.6   HGB 9.0*   HCT 27.9*   MCV 87.8   PLT 62*     BMP:   Lab Results   Component Value Date     04/20/2022    K 3.5 04/20/2022     04/20/2022    CO2 21 04/20/2022    BUN 6 04/20/2022    LABALBU 2.9 04/15/2022    LABALBU 4.7 04/19/2012    CREATININE <0.40 04/20/2022    CALCIUM 7.5 04/20/2022    GFRAA Can not be calculated 04/20/2022    LABGLOM Can not be calculated 04/20/2022     ABGs:No results found for: PHART, PO2ART, MSS9MOS No results found for: IFIO2, MODE, SETTIDVOL, SETPEEP  Ionized Calcium:  No results found for: IONCA  Magnesium:    Lab Results   Component Value Date    MG 1.3 04/20/2022     Phosphorus:    Lab Results   Component Value Date    PHOS 3.9 04/14/2022        LIVER PROFILE No results for input(s): AST, ALT, LIPASE, BILIDIR, BILITOT, ALKPHOS in the last 72 hours. Invalid input(s): AMYLASE,  ALB  INR No results for input(s): INR in the last 72 hours. PTT   Lab Results   Component Value Date    APTT 34.8 04/13/2022         RADIOLOGY     (See actual reports for details)    ASSESSMENT/PLAN   Principal Problem:    Gastrointestinal hemorrhage with melena  Active Problems:    GERD (gastroesophageal reflux disease)    Hepatic cirrhosis (HCC)    Hepatitis C virus infection resolved after antiviral drug therapy    Malignant neoplasm of lower third of esophagus ([facility]    Spinal stenosis of lumbar region with neurogenic claudication    Former smoker, 50+ pack years, quit 2016    Esophageal adenocarcinoma ([facility]    Anemia    Acute kidney injury ([facility]  Resolved Problems:    * No resolved hospital problems. *  Plan  Pt with chronic cirrhosis and ascites requiring therapeutic paracentetics. Recommendation of 2g sodium diet per GI  Continue diuretics outpatient  Will need to continue to follow with GI outpatient. Appears stable for discharge      Electronically signed by PAMELA Joshua on 4/20/2022 at 1:47 PM  Patient seen and examined independently by me. Above discussed and I agree with medical student note except where indicated in the EMR revision history. Also see my additional comments and changes indicated by discrete font, text color, italics, and/or initials. Labs, cultures, and radiographs where available were reviewed. Still appears to be fluid overloaded/distended. We will hold evening dose of Lasix and give him IV instead. Additionally, would suggest consolidating his spironolactone dose. Would also suggest keeping on the same dose of oral Lasix twice a day. Has not been mobilized for some reason he has not gotten in a chair.   Would also suggest switching him to oral Protonix    Electronically signed by Bryce Phillips MD on 4/20/2022 at 3:17 PM

## 2022-04-20 NOTE — PROGRESS NOTES
Today's Date: 4/20/2022  Patient Name: Dorcas Ortiz  Date of admission: 4/13/2022 10:10 AM  Patient's age: 57 y.o., 1960  Admission Dx: Severe anemia [D64.9]  Anemia, unspecified type [D64.9]    Requesting Physician: Amber Irizarry MD    CHIEF COMPLAINT: Excessive weakness and fatigue. Severe anemia. Esophageal cancer. SUBJECTIVE:  Patient was seen and examined. Hb stable  Platelets are slightly decreased to 62 today   No active bleeding  ++fatigue      BRIEF CASE HISTORY:    The patient is a 57 y.o.  male who is admitted to the hospital for further management of severe anemia. Patient presents with extreme weakness and fatigue. Symptoms started about 3 to 4 days ago. He has no dizziness. He has shortness of breath on minimal exertion. No abdominal pain. No chest pain. No difficulty swallowing. No nausea or vomiting. Patient denies any melena or hematochezia. No hematemesis. The patient is known to our practice. 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 Patient received chemoradiation with very good results. Patient's labs showed severe anemia with hemoglobin of 3.6. He was hospitalized with received blood transfusion. He is slightly better. Patient has no other complaints with treatment plan. No fever or signs of infection. No respiratory distress.     Past Medical History:   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 ([address] Anxiety, Arthritis, Back pain, chronic, Lezama esophagus, BPH (benign prostatic hypertrophy), Cholelithiasis, Cirrhosis ([address] COVID-19, COVID-19 vaccine series completed, DDD (degenerative disc disease), lumbar, Depression, Esophageal cancer ([address] Esophageal varices ([address] Fatty liver, GERD (gastroesophageal reflux disease), Hep C w/o coma, chronic ([address] History of alcohol abuse, History of blood transfusion, History of colon polyps, History of tobacco abuse, [place] (hard of hearing), Hyperlipidemia, Hypertension, Port-A-Cath in place, Sciatica, Spinal stenosis, Stomach ulcer, Tubular adenoma of colon, Vitamin D deficiency, and Wears glasses. Past Surgical History:   has a past surgical history that includes Bunionectomy; Nasal septum surgery; other surgical history (01/04/16); Colonoscopy; Colonoscopy (10/05/2016); other surgical history (11/21/2016); other surgical history (12/19/2016); knee surgery (Left); Bunionectomy (Left); Endoscopy, colon, diagnostic; pr revise median n/carpal tunnel surg (Right, 8/29/2017); Carpal tunnel release (Right); pr revise median n/carpal tunnel surg (Left, 10/31/2017); Colonoscopy (N/A, 3/30/2018); Colonoscopy (03/30/2018); pr njx aa&/strd tfrml epi lumbar/sacral 1 level (Bilateral, 9/6/2018); other surgical history (09/28/2018); pr njx aa&/strd tfrml epi lumbar/sacral 1 level (N/A, 9/28/2018); Pain management procedure (Left, 7/9/2020); Pain management procedure (Left, 7/20/2020); Pain management procedure (Bilateral, 8/17/2020); other surgical history (Right, 11/23/2020); Nerve Block (Right, 11/23/2020); Pain management procedure (Bilateral, 12/7/2020); Upper gastrointestinal endoscopy (N/A, 12/29/2020); Upper gastrointestinal endoscopy (N/A, 2/2/2021); Upper gastrointestinal endoscopy (N/A, 2/12/2021); Upper gastrointestinal endoscopy (2/12/2021); Sandy tooth extraction; IR PORT PLACEMENT > 5 YEARS (4/19/2021); Upper gastrointestinal endoscopy (N/A, 8/31/2021); Upper gastrointestinal endoscopy (N/A, 1/21/2022); Upper gastrointestinal endoscopy (N/A, 4/15/2022); and Colonoscopy (N/A, 4/16/2022). Family History: family history includes Asthma in his brother; Cancer in his father and mother; Diabetes in his sister. Social History:   reports that he quit smoking about 5 years ago. He has a 45.00 pack-year smoking history. He has never used smokeless tobacco. He reports previous alcohol use. He reports previous drug use.  Frequency: 1.00 time per week.   Medications:    Prior to Admission medications    Medication Sig Start Date End Date Taking? Authorizing Provider   cyclobenzaprine (FLEXERIL) 10 MG tablet Take 10 mg by mouth daily as needed for Muscle spasms   Yes Historical Provider, MD   oxyCODONE-acetaminophen (PERCOCET) 5-325 MG per tablet Take 1 tablet by mouth daily as needed for Pain for up to 30 days.  4/6/22 5/6/22  MASSIMO Meehan CNP   furosemide (LASIX) 20 MG tablet take 1 tablet by mouth once daily 4/4/22   Ramirez Spine, APRN - NP   nadolol (CORGARD) 20 MG tablet Take 1 tablet by mouth daily 2/8/22   Emiliano Hook MD   ferrous sulfate (IRON 325) 325 (65 Fe) MG tablet Take 1 tablet by mouth 2 times daily 1/12/22   VASU Dior   omeprazole (PRILOSEC) 40 MG delayed release capsule take 1 capsule by mouth EVERY MORNING BEFORE BREAKFAST 11/12/21   Governor Ahumada, MD   FLUoxetine (PROZAC) 20 MG capsule take 1 capsule by mouth once daily 11/12/21   VASU Dior   lidocaine-prilocaine (EMLA) 2.5-2.5 % cream Apply topically 45-60 mins prior to needle poke daily PRN 4/22/21   Governor Ahumada, MD   atorvastatin (LIPITOR) 20 MG tablet take 1 tablet by mouth at bedtime 2/23/21   MASSIMO Barrera CNP   spironolactone (ALDACTONE) 50 MG tablet take 1 tablet by mouth once daily 2/23/21   MASSIMO Barrera CNP     Current Facility-Administered Medications   Medication Dose Route Frequency Provider Last Rate Last Admin    [START ON 4/21/2022] furosemide (LASIX) tablet 40 mg  40 mg Oral QAM Ramirez Spine, APRN - NP        furosemide (LASIX) tablet 20 mg  20 mg Oral QPM Ramirez Spine, APRN - NP       Doni Gilmore Cotton ON 4/21/2022] spironolactone (ALDACTONE) tablet 100 mg  100 mg Oral QAM Ramirez Spine, APRN - NP        spironolactone (ALDACTONE) tablet 50 mg  50 mg Oral QPM Ramirez Spine, APRN - NP        pantoprazole (PROTONIX) 40 mg in sodium chloride (PF) 10 mL injection  40 mg IntraVENous Q12H Nancy Kendall MD   40 mg at 04/20/22 1114    nadolol (CORGARD) tablet 20 mg  20 mg Oral Daily Gab Martinez MD   20 mg at 04/20/22 1112    morphine (PF) injection 2 mg  2 mg IntraVENous Q4H PRN Meño Serna MD   2 mg at 04/20/22 0025    0.9 % sodium chloride infusion   IntraVENous PRN Marylou Lovelace MD        oxyCODONE (ROXICODONE) immediate release tablet 5 mg  5 mg Oral Q6H PRN Deb Phelan MD   5 mg at 04/18/22 0238    potassium chloride (KLOR-CON M) extended release tablet 40 mEq  40 mEq Oral PRN Marylou Lovelace MD   40 mEq at 04/20/22 6504    Or    potassium bicarb-citric acid (EFFER-K) effervescent tablet 40 mEq  40 mEq Oral PRN Marylou Lovelace MD        Or    potassium chloride 10 mEq/100 mL IVPB (Peripheral Line)  10 mEq IntraVENous PRN Marylou Lovelace MD        magnesium sulfate 1000 mg in dextrose 5% 100 mL IVPB  1,000 mg IntraVENous PRN Marylou Lovelace  mL/hr at 04/20/22 1408 1,000 mg at 04/20/22 1408    0.9 % sodium chloride infusion   IntraVENous PRN Augusto Cordova MD        sodium chloride flush 0.9 % injection 5-40 mL  5-40 mL IntraVENous 2 times per day Marylou Lovelace MD   10 mL at 04/20/22 1111    sodium chloride flush 0.9 % injection 5-40 mL  5-40 mL IntraVENous PRN Augusto Kumar MD   10 mL at 04/20/22 0025    0.9 % sodium chloride infusion  25 mL IntraVENous PRN Marylou Lovelace MD        ondansetron (ZOFRAN-ODT) disintegrating tablet 4 mg  4 mg Oral Q8H PRN Marylou Lovelace MD        Or    ondansetron (ZOFRAN) injection 4 mg  4 mg IntraVENous Q6H PRN Marylou Lovelace MD   4 mg at 04/14/22 0117    polyethylene glycol (GLYCOLAX) packet 17 g  17 g Oral Daily PRN Marylou Lovelace MD        midodrine (PROAMATINE) tablet 5 mg  5 mg Oral TID PRN Marylou Lovelace MD   5 mg at 04/19/22 0900    atorvastatin (LIPITOR) tablet 20 mg  20 mg Oral Nightly Marylou Lovelace MD   20 mg at 04/19/22 0327    FLUoxetine (PROZAC) capsule 20 mg  20 mg Oral Daily Lucian Harley MD   20 mg at 04/20/22 1112    sodium chloride flush 0.9 % injection 5-40 mL  5-40 mL IntraVENous BID Isaiah Elliott MD   10 mL at 04/20/22 1115    rifaximin (XIFAXAN) tablet 550 mg  550 mg Oral BID Isaiah Elliott MD   550 mg at 04/20/22 1112    lactulose (CHRONULAC) 10 GM/15ML solution 20 g  20 g Oral TID Isaiah Elliott MD   20 g at 04/20/22 1112       Allergies:  Bee pollen and Pollen extract    REVIEW OF SYSTEMS:      · General: Positive for weakness and fatigue. No unanticipated weight loss or decreased appetite. No fever or chills. · Eyes: No blurred vision, eye pain or double vision. · Ears: No hearing problems or drainage. No tinnitus. · Throat: No sore throat, problems with swallowing or dysphagia. · Respiratory: No cough, sputum or hemoptysis. Positive for exertional shortness of breath. No pleuritic chest pain. · Cardiovascular: No chest pain, orthopnea or PND. No lower extremity edema. No palpitation. · Gastrointestinal: As above. · Genitourinary: No dysuria, hematuria, frequency or urgency. · Musculoskeletal: No muscle aches or pains. No limitation of movement. No back pain. No gait disturbance, No joint complaints. · Dermatologic: No skin rashes or pruritus. No skin lesions or discolorations. · Psychiatric: No depression, anxiety, or stress or signs of schizophrenia. No change in mood or affect. · Hematologic: No history of bleeding tendency. No bruises or ecchymosis. No history of clotting problems. · Infectious disease: No fever, chills or frequent infections. · Endocrine: No polydipsia or polyuria. No temperature intolerance. · Neurologic: No headaches or dizziness. No weakness or numbness of the extremities. No changes in balance, coordination,  memory, mentation, behavior. · Allergic/Immunologic: No nasal congestion or hives. No repeated infections.        PHYSICAL EXAM:      BP (!) 98/52   Pulse 60   Temp 98 °F (36.7 °C) (Oral)   Resp 19   Ht 5' 10\" (1.778 m)   Wt 228 lb 6.3 oz (103.6 kg)   SpO2 98%   BMI 32.77 kg/m²    Temp (24hrs), Av.9 °F (36.6 °C), Min:97.6 °F (36.4 °C), Max:98.2 °F (36.8 °C)      General appearance - not in pain or distress. Looks pale. Mental status - alert and oriented  Eyes - pupils equal and reactive, extraocular eye movements intact  Ears - bilateral TM's and external ear canals normal  Nose - normal and patent, no erythema, discharge or polyps  Mouth - mucous membranes moist, pharynx normal without lesions  Neck - supple, no significant adenopathy  Lymphatics - no palpable lymphadenopathy, no hepatosplenomegaly  Chest - clear to auscultation, no wheezes, rales or rhonchi, symmetric air entry  Heart - normal rate, regular rhythm, normal S1, S2, no murmurs, rubs, clicks or gallops  Abdomen - soft, nontender, nondistended, no masses or organomegaly  Neurological - alert, oriented, normal speech, no focal findings or movement disorder noted  Musculoskeletal - no joint tenderness, deformity or swelling  Extremities - peripheral pulses normal, no pedal edema, no clubbing or cyanosis  Skin - normal coloration and turgor, no rashes, no suspicious skin lesions noted           DATA:      Labs:       CBC:   Recent Labs     22  0450 22  0517   WBC 8.7 7.6   HGB 9.9* 9.0*   HCT 30.4* 27.9*   PLT 83* 62*     BMP:   Recent Labs     22  0450 22  0517   * 132*   K 3.7 3.5*   CO2 22 21   BUN 3* 6*   CREATININE <0.40* <0.40*   LABGLOM Can not be calculated Can not be calculated   GLUCOSE 120* 107*     PT/INR:   No results for input(s): PROTIME, INR in the last 72 hours. APTT:  No results for input(s): APTT in the last 72 hours. LIVER PROFILE:  No results for input(s): AST, ALT, LABALBU in the last 72 hours. US LIVER  Narrative: EXAMINATION:  RIGHT UPPER QUADRANT ULTRASOUND    2022 9:24 am    COMPARISON:  None.     HISTORY:  ORDERING SYSTEM PROVIDED HISTORY: evaluate portal/hepatic vein, r/o PVT  TECHNOLOGIST PROVIDED HISTORY:    evaluate portal/hepatic vein, r/o PVT    FINDINGS:  LIVER:  Nodular margins of the liver would suggest hepatic cirrhosis. No  focal lesion. Liver 15.6 cm in length. Hepatopetal flow portal vein. BILIARY SYSTEM:  Gallstones and sludge within the gallbladder. No wall  thickening. Negative sonographic Silva's sign. Common bile duct is within normal limits measuring 4.6 mm.    OTHER: Moderate ascites visualized right upper quadrant. Impression: 1. Hepatic cirrhosis. No focal mass. Hepatopetal flow portal vein. 2. Gallstones and sludge within the gallbladder. 3. Ascites    RECOMMENDATIONS:  Unavailable  IMPRESSION:    Primary Problem  Gastrointestinal hemorrhage with melena    Active Hospital Problems    Diagnosis Date Noted    Anemia [D64.9] 04/13/2022    Acute kidney injury ([facility] [N17.9] 04/13/2022    Esophageal adenocarcinoma ([facility] [C15.9]     Former smoker, 50+ pack years, quit 2016 [Z87.891] 12/20/2021    Spinal stenosis of lumbar region with neurogenic claudication [M48.062] 12/14/2021    Malignant neoplasm of lower third of esophagus ([address] [C15.5]     Gastrointestinal hemorrhage with melena [K92.1]     Hepatitis C virus infection resolved after antiviral drug therapy [Z86.19] 12/23/2020    Hepatic cirrhosis ([address] [K74.60] 09/15/2016    GERD (gastroesophageal reflux disease) [K21.9] 04/19/2012       RECOMMENDATIONS:  1. Records and labs and images were reviewed and discussed with the patient. 2. Patient had esophageal cancer of the lower third of the esophagus. Status post chemoradiation with good results. 3. Current presentation with severe anemia status post transfusion and iron infusion  4. Thrombocytopenia, chronic likely from his liver disease with acute decrease and peripheral blood smear did not show any microangiopathic picture or symptoms suggestive of DIC.   Plan to monitor platelets at this time  5. Appreciate GI input. 6.  We will continue to transfuse to maintain hemoglobin above 7   7. I will continue  IV iron infusion. 8. We will follow      Discussed with patient and Nurse. Garrison James MD  Hematologist/Medical Oncologist    Cell: [phone]            This note is created with the assistance of a speech recognition program.  While intending to generate a document that actually reflects the content of the visit, the document can still have some errors including those of syntax and sound a like substitutions which may escape proof reading. It such instances, actual meaning can be extrapolated by contextual diversion.

## 2022-04-20 NOTE — CARE COORDINATION
Pt to be discharged to [facility] on 04/21/22 at 1200 transported by [facility]. writer faxed info to Aga Andre

## 2022-04-20 NOTE — PLAN OF CARE
Problem: Falls - Risk of:  Goal: Will remain free from falls  Description: Will remain free from falls  Outcome: Met This Shift  Note: No falls this shift. Pt used call light at times. Pt had been found to get up to [facility] per self without assist. Pt states \"I couldn't wait\" pt takes lactulose for liver disease and has freq stools at times. Medicated with IV lasix this shift with freq urination. Problem: Skin Integrity:  Goal: Absence of new skin breakdown  Description: Absence of new skin breakdown  Outcome: Met This Shift  Note: No new skin breakdown noted this shift. Pt able to reposition self side to side without difficulty. Problem: Tissue Perfusion:  Goal: Ability to maintain adequate tissue perfusion will improve  Description: Ability to maintain adequate tissue perfusion will improve  Outcome: Ongoing  Note: VS as charted. SBP <110. Prn proamitine given as ordered. Pt denies dizziness. Hgb 9.9. no s/s of bleeding noted. Problem: Pain:  Goal: Control of chronic pain  Description: Control of chronic pain  Outcome: Ongoing  Note: Pt c/o chronic back pain. Has stated he'll \"have to get his injections for his back after he leaves here. \". pt repositions self for comfort without difficulty.

## 2022-04-20 NOTE — CARE COORDINATION
Writer called [facility]( [phone])  to advise patient will be discharging tomorrow. Was advised they need a HENS lace tomorrow and needed approval code from [facility] given to her. Writer advised DR Lui Pedroza our medical was not given a auth code and was advised insurance company would be sending this information on to [facility]. Patient set to discharge tomorrow.     Electronically signed by Mary Velez RN on 4/20/2022 at 5:27 PM

## 2022-04-20 NOTE — CARE COORDINATION
MARGARITA spoke with Dr. Dea Orr, states she has been informed by insurance that pt was approved as a result of the [id number] call today, Dr Dea Orr states she did not receive any reference/confirmation number states she was informed the [place] will be sent to the facility directly. MARGARITA updated staff.

## 2022-04-20 NOTE — PROGRESS NOTES
[address]    PROGRESS NOTE             4/20/2022    8:33 AM    Name:   Ela Garcia  MRN:     [id number]     Acct:      [de-identified]   Room:   [id number]  IP Day:  7  Admit Date:  4/13/2022 10:10 AM    PCP:  VASU Gomez  Code Status:  Full Code    Subjective:     C/C:   Chief Complaint   Patient presents with    Shortness of Breath     Interval History Status: not changed. Patient having nausea this morning. No vomiting. Denies bloody stools, last BM about 230am.     Brief History:     The patient is a 61 y.o.  Non- / Chuy Hose PMH of alcoholic liver disease/cirrhosis, history of hepatitis C (treated).  He is a former smoker with 50+ pack year history, and admits to former heavy alcohol use.  Denies illicit or IV drug use. Patient also has a history of GE junction adenocarcinoma s/p chemoradiation completed 6/9/21 and PET 7/23/21 with no recurrence, as well as most recent EGD on 1/21/22 which was negative for malignanacy.  This EGD also showed severe portal hypertensive gastropathy with some oozing of blood visualized.     He presented to the emergency department today from pain management clinic where he was scheduled to have an epidural steroid injection for back pain due to spinal stenosis.  However, he was unable to lie prone due to abdominal distention and shortness of breath and he was sent to the emergency department.      According to the patient, he was scheduled for therapeutic paracentesis today at [facility] this afternoon.  His most recent paracentesis was 4/5/2022 with 7.5 L removed.  Per patient, he states that it accumulated quickly afterwards and came back Nunez Isai. \"  This was his second time having paracentesis done; previous paracentesis done in 2016.  He states that he has not been taking his spironolactone for the past few days, as he felt that it was making his blood pressure too low.  He reports that he took all other home medications, though.     Currently, patient states he has some shortness of breath which she attributes to abdominal distention, as well as nausea. However, denies  fever, chills, chest pain, abdominal pain, hematuria, or dark/bloody stool.  Last bowel movement was yesterday     In the emergency department, hemoglobin was found to be 3.6, recheck 3.5. Sodium 120.  Creatinine 1.42, baseline within normal limits.  Total bili 2.4.  INR 1.5.       He was given a dose of Protonix and 1 unit PRBCs in the ED.     He is being admitted to the hospital for the management of severe anemia and sepsis.     4/13: pt received 3 units PRBCs, venofer added per heme/onc     4/14: IR guided paracentesis 5L removed, FOBT positive, bowel movement revealed bright red blood per rectum; hgb 6.9 despite receiving 6 units of blood and FFP, emergent EGD overnight following additional episodes of hematochezia, no annamaria bleeding noted, may need colonoscopy     4/15: more bloody stools, plan for colonoscopy tomorrow, may need nuclear bleeding scan, as well     4/16: colonoscopy, no source of active bleed visualized, hemoglobin stabilizing, platelets trending down, hyponatremia improving but still low     4/17: hgb stable, advance diet slowly     4/18: liver u/s today to assess for portal vein thrombosis, spironolactone, lasix, and nadolol restarted, paracentesis today 4.8L removed    4/19: liver u/s showed patent portal vein, pt tolerating diet, platelets improving    4/20: results of colon biopsies consistent with tubular adenoma and adenoma (low grade dysplasia) with acute inflammation, platelets decreasing    Review of Systems:     Review of Systems   Constitutional: Negative for chills and fatigue. Respiratory: Negative for shortness of breath. Cardiovascular: Negative for chest pain and leg swelling. Gastrointestinal: Positive for abdominal distention, abdominal pain (from nausea) and nausea. Negative for vomiting. Neurological: Negative for dizziness, light-headedness and headaches. All other systems reviewed and are negative. Medications: Allergies: Allergies   Allergen Reactions    Bee Pollen Other (See Comments)     Runny nose, watery eyes    Pollen Extract      Runny nose, watery eyes       Current Meds:   Scheduled Meds:    pantoprazole (PROTONIX) 40 mg injection  40 mg IntraVENous Q12H    nadolol  20 mg Oral Daily    furosemide  40 mg IntraVENous Once    spironolactone  100 mg Oral Daily    furosemide  40 mg Oral Daily    sodium chloride flush  5-40 mL IntraVENous 2 times per day    atorvastatin  20 mg Oral Nightly    FLUoxetine  20 mg Oral Daily    sodium chloride flush  5-40 mL IntraVENous BID    rifAXIMin  550 mg Oral BID    lactulose  20 g Oral TID     Continuous Infusions:    sodium chloride      sodium chloride      sodium chloride       PRN Meds: morphine, sodium chloride, oxyCODONE, potassium chloride **OR** potassium alternative oral replacement **OR** potassium chloride, magnesium sulfate, sodium chloride, sodium chloride flush, sodium chloride, ondansetron **OR** ondansetron, polyethylene glycol, midodrine    Data:     Past Medical History:   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 ([facility] Anxiety, Arthritis, Back pain, chronic, Lezama esophagus, BPH (benign prostatic hypertrophy), Cholelithiasis, Cirrhosis ([facility] COVID-19, COVID-19 vaccine series completed, DDD (degenerative disc disease), lumbar, Depression, Esophageal cancer ([facility] Esophageal varices ([facility] Fatty liver, GERD (gastroesophageal reflux disease), Hep C w/o coma, chronic ([facility] History of alcohol abuse, History of blood transfusion, History of colon polyps, History of tobacco abuse, Kiana (hard of hearing), Hyperlipidemia, Hypertension, Port-A-Cath in place, Sciatica, Spinal stenosis, Stomach ulcer, Tubular adenoma of colon, Vitamin D deficiency, and Wears glasses.     Social History:   reports that he quit smoking about 5 years ago. He has a 45.00 pack-year smoking history. He has never used smokeless tobacco. He reports previous alcohol use. He reports previous drug use. Frequency: 1.00 time per week. Family History:   Family History   Problem Relation Age of Onset   Combs Cancer Mother         pancreatic    Cancer Father         bone    Diabetes Sister     Asthma Brother        Vitals:  /68   Pulse 67   Temp 98.2 °F (36.8 °C) (Oral)   Resp 18   Ht 5' 10\" (1.778 m)   Wt 228 lb 6.3 oz (103.6 kg)   SpO2 98%   BMI 32.77 kg/m²   Temp (24hrs), Av.8 °F (36.6 °C), Min:97.6 °F (36.4 °C), Max:98.2 °F (36.8 °C)    No results for input(s): POCGLU in the last 72 hours. I/O(24Hr):     Intake/Output Summary (Last 24 hours) at 2022 0833  Last data filed at 2022 0445  Gross per 24 hour   Intake 987.24 ml   Output 950 ml   Net 37.24 ml       Labs:    CBC with Differential:    Lab Results   Component Value Date    WBC 7.6 2022    RBC 3.18 2022    RBC 4.75 2012    HGB 9.0 2022    HCT 27.9 2022    PLT 62 2022     2012    MCV 87.8 2022    MCH 28.3 2022    MCHC 32.3 2022    RDW 24.4 2022    NRBC 1 2022    LYMPHOPCT 3 2022    MONOPCT 11 2022    MYELOPCT 1 2021    BASOPCT 0 2022    MONOSABS 0.91 2022    LYMPHSABS 0.25 2022    EOSABS 0.08 2022    BASOSABS 0.00 2022    DIFFTYPE NOT REPORTED 2022     BMP:    Lab Results   Component Value Date     2022    K 3.5 2022     2022    CO2 21 2022    BUN 6 2022    LABALBU 2.9 04/15/2022    LABALBU 4.7 2012    CREATININE <0.40 2022    CALCIUM 7.5 2022    GFRAA Can not be calculated 2022    LABGLOM Can not be calculated 2022    GLUCOSE 107 2022    GLUCOSE 65 2012       Radiology:    US LIVER    Result Date: 4/19/2022  EXAMINATION: RIGHT UPPER QUADRANT ULTRASOUND 4/19/2022 9:24 am COMPARISON: None. HISTORY: ORDERING SYSTEM PROVIDED HISTORY: evaluate portal/hepatic vein, r/o PVT TECHNOLOGIST PROVIDED HISTORY: evaluate portal/hepatic vein, r/o PVT FINDINGS: LIVER:  Nodular margins of the liver would suggest hepatic cirrhosis. No focal lesion. Liver 15.6 cm in length. Hepatopetal flow portal vein. BILIARY SYSTEM:  Gallstones and sludge within the gallbladder. No wall thickening. Negative sonographic Silva's sign. Common bile duct is within normal limits measuring 4.6 mm. OTHER: Moderate ascites visualized right upper quadrant. 1. Hepatic cirrhosis. No focal mass. Hepatopetal flow portal vein. 2. Gallstones and sludge within the gallbladder. 3. Ascites RECOMMENDATIONS: Unavailable     XR CHEST PORTABLE    Result Date: 4/15/2022  EXAMINATION: ONE XRAY VIEW OF THE CHEST 4/15/2022 7:59 am COMPARISON: 02/02/2022, 12/21/2021 HISTORY: ORDERING SYSTEM PROVIDED HISTORY: Dyspnea TECHNOLOGIST PROVIDED HISTORY: Dyspnea Reason for Exam: Dyspnea FINDINGS: Right chest port terminates in the superior vena cava. Mild pulmonary vascular congestion. No focal pulmonary opacities. Calcified granulomata and calcified mediastinal nodes from prior granulomatous infection. Cardiomegaly which is similar to 4 months prior. No acute bony findings. Mild pulmonary vascular congestion. Cardiomegaly. IR US GUIDED PARACENTESIS    Result Date: 4/18/2022  PROCEDURE: PARACENTESIS WITH IMAGE GUIDANCE US ABDOMEN LIMITED 4/18/2022 HISTORY: ORDERING SYSTEM PROVIDED HISTORY: worsening acites and SOB TECHNOLOGIST PROVIDED HISTORY: worsening acites and SOB Please remove no more than 5 L TECHNIQUE: Informed consent was obtained after a detailed explanation of the procedure including risks, benefits, and alternatives. Universal protocol was followed. A limited ultrasound of the abdomen was performed and shows large amount of ascites.   The right abdomen was prepped and draped in sterile fashion and local anesthesia was achieved with lidocaine. A 5 English needle sheath was advanced into ascites using realtime ultrasound guidance and paracentesis was performed. The patient tolerated the procedure well. EBL: None FINDINGS: Limited ultrasound of the abdomen demonstrates ascites. A total of 4.8 L of clear yellow fluid was removed. Successful ultrasound guided paracentesis. IR US GUIDED PARACENTESIS    Result Date: 4/14/2022  PROCEDURE: PARACENTESIS WITH IMAGE GUIDANCE US ABDOMEN LIMITED 4/14/2022 HISTORY: ORDERING SYSTEM PROVIDED HISTORY: ascites remove 5 liters only TECHNOLOGIST PROVIDED HISTORY: ascites remove 5 liters only TECHNIQUE: Informed consent was obtained after a detailed explanation of the procedure including risks, benefits, and alternatives. Universal protocol was followed. A limited ultrasound of the abdomen was performed and shows a moderate to large amount of ascites. The right abdomen was prepped and draped in sterile fashion and local anesthesia was achieved with lidocaine. A 5 English needle sheath was advanced into ascites using realtime ultrasound guidance and paracentesis was performed. The patient tolerated the procedure well. EBL: None FINDINGS: Limited ultrasound of the abdomen demonstrates ascites. A total of 5 L of slightly turbid yellow colored fluid was removed. Additional fluid remains on completion. Successful ultrasound-guided paracentesis. IR US GUIDED PARACENTESIS    Result Date: 4/5/2022  PROCEDURE: IR US GUIDED PARACENTESIS W IMAGING 4/5/2022 HISTORY: ORDERING SYSTEM PROVIDED HISTORY: Ascites due to alcoholic cirrhosis (HCC) TECHNIQUE: Sonography was utilized CONTRAST: None SEDATION: None FLUOROSCOPY DOSE AND TYPE OR TIME AND EXPOSURES: None DESCRIPTION OF PROCEDURE: Informed consent was obtained after a detailed explanation of the procedure including risks, benefits, and alternatives.   Universal protocol was observed. Preprocedure ultrasound shows a dominant fluid pocket in the right lowerquadrant. Using ultrasound guidance (image attached to the medical record), the fluid pocket was accessed with a 5 [place] Lorrie Yueh needle with aspiration of inch clear yellow fluid. Vacuum bottle paracentesis was performed and approximately 7.25 L was removed. the patient tolerated the procedure well and left the department in good condition. Dr. Shannan Castillo was present. Patient received IV albumin replacement. FINDINGS: 7.25 L drained     Successful ultrasound-guided therapeutic paracentesis         Physical Examination:        Physical Exam  Constitutional:       General: He is not in acute distress. Appearance: Normal appearance. He is not ill-appearing. Eyes:      General: No scleral icterus. Extraocular Movements: Extraocular movements intact. Conjunctiva/sclera: Conjunctivae normal.   Cardiovascular:      Rate and Rhythm: Normal rate and regular rhythm. Pulmonary:      Effort: Pulmonary effort is normal. No respiratory distress. Breath sounds: Normal breath sounds. No wheezing, rhonchi or rales. Abdominal:      General: Bowel sounds are normal. There is distension (improved from prior). Tenderness: There is no abdominal tenderness. Comments: Protuberant   Musculoskeletal:      Right lower leg: No edema. Left lower leg: No edema. Skin:     Coloration: Skin is jaundiced. Neurological:      General: No focal deficit present. Mental Status: He is alert and oriented to person, place, and time.    Psychiatric:         Mood and Affect: Mood normal.         Behavior: Behavior normal.           Assessment:        Primary Problem  Gastrointestinal hemorrhage with melena    Active Hospital Problems    Diagnosis Date Noted    Anemia [D64.9] 04/13/2022    Acute kidney injury ([facility] [N17.9] 04/13/2022    Esophageal adenocarcinoma ([facility] [C15.9]     Former smoker, 50+ pack years, quit 2016 [Z87.891] 12/20/2021    Spinal stenosis of lumbar region with neurogenic claudication [M48.062] 12/14/2021    Malignant neoplasm of lower third of esophagus (HCC) [C15.5]     Gastrointestinal hemorrhage with melena [K92.1]     Hepatitis C virus infection resolved after antiviral drug therapy [Z86.19] 12/23/2020    Hepatic cirrhosis ([facility] [K74.60] 09/15/2016    GERD (gastroesophageal reflux disease) [K21.9] 04/19/2012       Plan:        Severe anemia in the setting of cirrhosis and portal hypertension, likely secondary to GI bleed, improved  -Hemoglobin 3.5 on admission, 9.0 this morning  -Has received 7 units PRBCs and 1 unit FFP total since admission (+1 unit in ED)  -Protonix drip switched to 40mg IV BID  -FOBT positive and pt was having melena/hematochezia; no longer appears to be having bloody bowel movements  -EGD and colonoscopy did not reveal source of active bleed  -Monitor hgb, transfuse if <7  -GI is following     Thrombocytopenia, worsening  -platelets 886 on admission  -Has received 7 units of blood, 1 unit FFP  -Platelets this morning 63, decreased  -Likely 2/2 liver disease  -does not appear to be bleeding currently, hgb stable  -heme onc on board, appreciate recommendations       Hyponatremia, stable  -Sodium 120 on admission, 132 this AM  -Pt is not chronically hyponatremic  -Patient is likely intravascularly volume depleted 2/2 third spacing from ascites  -continue to monitor     Ascites 2/2 cirrhotic liver disease  -cirrhosis 2/2 heavy alcohol use in the past; patient also has history of hepatitis C s/p treatment  -Patient had IR guided paracentesis on 4/5/2022 and had rapid reaccumulation of ascitic fluid during this time; was scheduled for paracentesis on day of admission  -s/p IR guided paracentesis 4/14; 5L max removed per GI recommendations, fluid has reaccumulated   -IR guided paracentesis also done 4/18; 4.8L removed  -liver u/s showed patency of portal vein, no cirrhosis,  Portal hypertension,  Acute GI bleed, admitted with a hemoglobin of 3.5, status post replacement of 7 units, check ferritin levels,  Thrombocytopenia,  Hyponatremia chronic sinusitis, status post paracentesis, repeat ordered by IR,  Acute kidney injury, hepatorenal syndrome,  Hypokalemia,  History of GE junction adenocarcinoma in remission as per hematology oncology, work-up under progress,  Overall prognosis very poor,      Electronically signed by Celina Woods MD

## 2022-04-20 NOTE — PROGRESS NOTES
GI Progress notes    4/20/2022   2:00 PM    Name:  Karel Schwartz  MRN:    [id number]     Acct:     [de-identified]   Room:  2115/2115-01   Day: 7     Admit Date: 4/13/2022 10:10 AM  PCP: VASU Clements    Subjective:     C/C:   Chief Complaint   Patient presents with    Shortness of Breath       Interval History: Status: improved. Patient seen and examined. No acute events overnight. Reports fatigue. No significant abdominal pain but reports pressure  Has SOB with exertion. ROS:  Constitutional: negative for chills, fevers and sweats  Gastrointestinal: negative for constipation, diarrhea, nausea and vomiting  Neurological: negative for dizziness and headaches    Medications: Allergies:    Allergies   Allergen Reactions    Bee Pollen Other (See Comments)     Runny nose, watery eyes    Pollen Extract      Runny nose, watery eyes       Current Meds: pantoprazole (PROTONIX) 40 mg in sodium chloride (PF) 10 mL injection, Q12H  nadolol (CORGARD) tablet 20 mg, Daily  furosemide (LASIX) injection 40 mg, Once  morphine (PF) injection 2 mg, Q4H PRN  spironolactone (ALDACTONE) tablet 100 mg, Daily  furosemide (LASIX) tablet 40 mg, Daily  0.9 % sodium chloride infusion, PRN  oxyCODONE (ROXICODONE) immediate release tablet 5 mg, Q6H PRN  potassium chloride (KLOR-CON M) extended release tablet 40 mEq, PRN   Or  potassium bicarb-citric acid (EFFER-K) effervescent tablet 40 mEq, PRN   Or  potassium chloride 10 mEq/100 mL IVPB (Peripheral Line), PRN  magnesium sulfate 1000 mg in dextrose 5% 100 mL IVPB, PRN  0.9 % sodium chloride infusion, PRN  sodium chloride flush 0.9 % injection 5-40 mL, 2 times per day  sodium chloride flush 0.9 % injection 5-40 mL, PRN  0.9 % sodium chloride infusion, PRN  ondansetron (ZOFRAN-ODT) disintegrating tablet 4 mg, Q8H PRN   Or  ondansetron (ZOFRAN) injection 4 mg, Q6H PRN  polyethylene glycol (GLYCOLAX) packet 17 g, Daily PRN  midodrine (PROAMATINE) tablet 5 mg, TID PRN  atorvastatin (LIPITOR) tablet 20 mg, Nightly  FLUoxetine (PROZAC) capsule 20 mg, Daily  sodium chloride flush 0.9 % injection 5-40 mL, BID  rifaximin (XIFAXAN) tablet 550 mg, BID  lactulose (CHRONULAC) 10 GM/15ML solution 20 g, TID        Data:     Code Status:  Full Code    Family History   Problem Relation Age of Onset    Cancer Mother         pancreatic    Cancer Father         bone    Diabetes Sister     Asthma Brother        Social History     Socioeconomic History    Marital status: Single     Spouse name: Not on file    Number of children: Not on file    Years of education: Not on file    Highest education level: Not on file   Occupational History    Not on file   Tobacco Use    Smoking status: Former Smoker     Packs/day: 1.00     Years: 45.00     Pack years: 45.00     Quit date: 2017     Years since quittin.2    Smokeless tobacco: Never Used   Vaping Use    Vaping Use: Never used   Substance and Sexual Activity    Alcohol use: Not Currently     Comment: quit     Drug use: Not Currently     Frequency: 1.0 times per week     Comment: cocaine,  stopped spring 2016    Sexual activity: Yes     Partners: Female   Other Topics Concern    Not on file   Social History Narrative     in the past, retired     Social Determinants of Health     Financial Resource Strain:     Difficulty of Paying Living Expenses: Not on file   Food Insecurity:     Worried About [address] in the Last Year: Not on file    [address] in the Last Year: Not on file   Transportation Needs:     Lack of Transportation (Medical): Not on file    Lack of Transportation (Non-Medical):  Not on file   Physical Activity:     Days of Exercise per Week: Not on file    Minutes of Exercise per Session: Not on file   Stress:     Feeling of Stress : Not on file   Social Connections:     Frequency of Communication with Friends and Family: Not on file    Frequency of Social Gatherings with Friends and Family: Not on file    Attends Druze Services: Not on file    Active Member of Clubs or Organizations: Not on file    Attends Club or Organization Meetings: Not on file    Marital Status: Not on file   Intimate Partner Violence:     Fear of Current or Ex-Partner: Not on file    Emotionally Abused: Not on file    Physically Abused: Not on file    Sexually Abused: Not on file   Housing Stability:     Unable to Pay for Housing in the Last Year: Not on file    Number of [place] in the Last Year: Not on file    Unstable Housing in the Last Year: Not on file       Vitals:  /68   Pulse 67   Temp 98.2 °F (36.8 °C) (Oral)   Resp 18   Ht 5' 10\" (1.778 m)   Wt 228 lb 6.3 oz (103.6 kg)   SpO2 98%   BMI 32.77 kg/m²   Temp (24hrs), Av.8 °F (36.6 °C), Min:97.6 °F (36.4 °C), Max:98.2 °F (36.8 °C)    No results for input(s): POCGLU in the last 72 hours. I/O (24Hr):     Intake/Output Summary (Last 24 hours) at 2022 1400  Last data filed at 2022 0445  Gross per 24 hour   Intake 460 ml   Output 950 ml   Net -490 ml       Labs:      CBC:   Lab Results   Component Value Date    WBC 7.6 2022    RBC 3.18 2022    RBC 4.75 2012    HGB 9.0 2022    HCT 27.9 2022    MCV 87.8 2022    MCH 28.3 2022    MCHC 32.3 2022    RDW 24.4 2022    PLT 62 2022     2012    MPV 7.7 2022     CBC with Differential:    Lab Results   Component Value Date    WBC 7.6 2022    RBC 3.18 2022    RBC 4.75 2012    HGB 9.0 2022    HCT 27.9 2022    PLT 62 2022     2012    MCV 87.8 2022    MCH 28.3 2022    MCHC 32.3 2022    RDW 24.4 2022    NRBC 1 2022    LYMPHOPCT 3 2022    MONOPCT 11 2022    MYELOPCT 1 2021    BASOPCT 0 2022    MONOSABS 0.91 2022    LYMPHSABS 0.25 2022    EOSABS 0.08 2022    BASOSABS 0.00 2022 DIFFTYPE NOT REPORTED 02/02/2022     Hemoglobin/Hematocrit:    Lab Results   Component Value Date    HGB 9.0 04/20/2022    HCT 27.9 04/20/2022     CMP:    Lab Results   Component Value Date     04/20/2022    K 3.5 04/20/2022     04/20/2022    CO2 21 04/20/2022    BUN 6 04/20/2022    CREATININE <0.40 04/20/2022    GFRAA Can not be calculated 04/20/2022    LABGLOM Can not be calculated 04/20/2022    GLUCOSE 107 04/20/2022    GLUCOSE 65 04/19/2012    PROT 4.5 04/15/2022    LABALBU 2.9 04/15/2022    LABALBU 4.7 04/19/2012    CALCIUM 7.5 04/20/2022    BILITOT 6.08 04/15/2022    ALKPHOS 90 04/15/2022    AST 34 04/15/2022    ALT 17 04/15/2022     BMP:    Lab Results   Component Value Date     04/20/2022    K 3.5 04/20/2022     04/20/2022    CO2 21 04/20/2022    BUN 6 04/20/2022    LABALBU 2.9 04/15/2022    LABALBU 4.7 04/19/2012    CREATININE <0.40 04/20/2022    CALCIUM 7.5 04/20/2022    GFRAA Can not be calculated 04/20/2022    LABGLOM Can not be calculated 04/20/2022    GLUCOSE 107 04/20/2022    GLUCOSE 65 04/19/2012     PT/INR:    Lab Results   Component Value Date    PROTIME 18.3 04/13/2022    INR 1.5 04/13/2022     PTT:    Lab Results   Component Value Date    APTT 34.8 04/13/2022   [APTT}    Physical Examination:        General appearance: alert, cooperative and no distress  Mental Status: oriented to person, place and time and normal affect  Abdomen: soft, nontender, distended, bowel sounds present  Extremities: no edema, redness or tenderness in the calves  Skin: no gross lesions, rashes, or induration    Assessment:        Primary Problem  Gastrointestinal hemorrhage with melena     Active Hospital Problems    Diagnosis Date Noted    Anemia [D64.9] 04/13/2022    Acute kidney injury ([address] [N17.9] 04/13/2022    Esophageal adenocarcinoma ([facility] [C15.9]     Former smoker, 50+ pack years, quit 2016 [Z87.891] 12/20/2021    Spinal stenosis of lumbar region with neurogenic claudication [B04.975] 12/14/2021    Malignant neoplasm of lower third of esophagus ([facility] [C15.5]     Gastrointestinal hemorrhage with melena [K92.1]     Hepatitis C virus infection resolved after antiviral drug therapy [Z86.19] 12/23/2020    Hepatic cirrhosis ([address] [K74.60] 09/15/2016    GERD (gastroesophageal reflux disease) [K21.9] 04/19/2012     Past Medical History:   Diagnosis Date    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 Osbaldo Roman, orthopedic, every 3-4 months, gets steroid injection    Lezama esophagus     BPH (benign prostatic hypertrophy)     Cholelithiasis     Cirrhosis ([address]     COVID-19 12/2020    pt reports he had a positive test while at [facility] in 2020, was asymptomatic    COVID-19 vaccine series completed 5/20/2021, 6/22/2021    Moderna 5/20/2021, 6/22/2021    DDD (degenerative disc disease), lumbar     Depression     Esophageal cancer ([facility]     INVASIVE ADENOCARCINOMA ARISING IN TUBULAR ADENOMA WITH HIGH GRADE DYSPLASIA, ASSOCIATED WITH FOCAL INTESTINAL METAPLASIA     Esophageal varices ([address]     Fatty liver     GERD (gastroesophageal reflux disease)     Hep C w/o coma, chronic ([address]     History of alcohol abuse     6-12 beers a day; quit drinking July 2016    History of blood transfusion     History of colon polyps 2016    History of tobacco abuse     [place]     Hyperlipidemia     Hypertension     Port-A-Cath in place     right upper chest    Sciatica     Spinal stenosis     Stomach ulcer     hx of    Tubular adenoma of colon 2016, 2018    Vitamin D deficiency     Wears glasses         Plan:        1. Cirrhosis 2/2 EtOH abuse with ascites  1. Continue lasix and aldactone, will add evening combination dose of lasix and aldactone  2. PRN paracentesis  3. Monitor BMP  4. 2 gm sodium diet  5. Avoid sedatives and narcotics  6. US liver reviewed, hepatopetal flow  7.  If recurrent/refractory ascites, will need to discuss TIPS further  8. Xifaxan  9. Lactulose  2. Severe anemia, GI bleeding,hx of esophageal cancer s/p EGD/colonoscopy revealing some radiation gastritis, no esophageal varices, portal hypertensive gastropathy, hemorrhoids, lesion sigmoid colon, polyp R colon  1. No overt bleeding  2. Hgb stable  3. Continue PPI  4. Continue Nadolol  5. Follow-up outpatient next 1-2 weeks  6. Plans for d/c to rehab    Explained to the patient and d/W Nursing Staff  Will F/U with you  Please call or Page for any issues or change in status  Thanks    Electronically signed by MASSIMO Howell NP on 4/20/2022 at 2:00 PM       GI attending physician note. Patient seen with APRN at about 11:45 AM    I independently performed an evaluation on [place]. I have reviewed the above documentation completed by the Nurse Practitioner and agree with the history, examination, and management plan. Recommendations discussed. To try aggressively with diuretics to manage ascites. Basing on the results we will decide whether he needs TIPS.   Needs to follow closely as an outpatient to adjust the medication and to watch renal function    Discussed with Dr. Amanuel Rico and patient

## 2022-04-21 VITALS
OXYGEN SATURATION: 96 % | WEIGHT: 228.4 LBS | HEIGHT: 70 IN | SYSTOLIC BLOOD PRESSURE: 96 MMHG | RESPIRATION RATE: 18 BRPM | DIASTOLIC BLOOD PRESSURE: 51 MMHG | TEMPERATURE: 98.5 F | HEART RATE: 55 BPM | BODY MASS INDEX: 32.7 KG/M2

## 2022-04-21 LAB
ANION GAP SERPL CALCULATED.3IONS-SCNC: 9 MMOL/L (ref 9–17)
BUN BLDV-MCNC: 8 MG/DL (ref 8–23)
CALCIUM SERPL-MCNC: 7.5 MG/DL (ref 8.6–10.4)
CHLORIDE BLD-SCNC: 100 MMOL/L (ref 98–107)
CO2: 23 MMOL/L (ref 20–31)
CREAT SERPL-MCNC: <0.4 MG/DL (ref 0.7–1.2)
GFR AFRICAN AMERICAN: ABNORMAL ML/MIN
GFR NON-AFRICAN AMERICAN: ABNORMAL ML/MIN
GFR SERPL CREATININE-BSD FRML MDRD: ABNORMAL ML/MIN/{1.73_M2}
GLUCOSE BLD-MCNC: 100 MG/DL (ref 70–99)
HCT VFR BLD CALC: 27 % (ref 41–53)
HEMOGLOBIN: 8.6 G/DL (ref 13.5–17.5)
MAGNESIUM: 1.7 MG/DL (ref 1.6–2.6)
MCH RBC QN AUTO: 28.4 PG (ref 26–34)
MCHC RBC AUTO-ENTMCNC: 32 G/DL (ref 31–37)
MCV RBC AUTO: 88.8 FL (ref 80–100)
PDW BLD-RTO: 23.9 % (ref 11.5–14.9)
PLATELET # BLD: 98 K/UL (ref 150–450)
PMV BLD AUTO: 7.5 FL (ref 6–12)
POTASSIUM SERPL-SCNC: 3.5 MMOL/L (ref 3.7–5.3)
RBC # BLD: 3.04 M/UL (ref 4.5–5.9)
SODIUM BLD-SCNC: 132 MMOL/L (ref 135–144)
WBC # BLD: 6.7 K/UL (ref 3.5–11)

## 2022-04-21 PROCEDURE — 80048 BASIC METABOLIC PNL TOTAL CA: CPT

## 2022-04-21 PROCEDURE — A4216 STERILE WATER/SALINE, 10 ML: HCPCS

## 2022-04-21 PROCEDURE — 99239 HOSP IP/OBS DSCHRG MGMT >30: CPT | Performed by: INTERNAL MEDICINE

## 2022-04-21 PROCEDURE — 85027 COMPLETE CBC AUTOMATED: CPT

## 2022-04-21 PROCEDURE — 2580000003 HC RX 258: Performed by: INTERNAL MEDICINE

## 2022-04-21 PROCEDURE — 2580000003 HC RX 258

## 2022-04-21 PROCEDURE — C9113 INJ PANTOPRAZOLE SODIUM, VIA: HCPCS

## 2022-04-21 PROCEDURE — 6370000000 HC RX 637 (ALT 250 FOR IP): Performed by: NURSE PRACTITIONER

## 2022-04-21 PROCEDURE — 6370000000 HC RX 637 (ALT 250 FOR IP): Performed by: INTERNAL MEDICINE

## 2022-04-21 PROCEDURE — 6360000002 HC RX W HCPCS

## 2022-04-21 PROCEDURE — 6370000000 HC RX 637 (ALT 250 FOR IP): Performed by: STUDENT IN AN ORGANIZED HEALTH CARE EDUCATION/TRAINING PROGRAM

## 2022-04-21 PROCEDURE — 36415 COLL VENOUS BLD VENIPUNCTURE: CPT

## 2022-04-21 PROCEDURE — 6360000002 HC RX W HCPCS: Performed by: STUDENT IN AN ORGANIZED HEALTH CARE EDUCATION/TRAINING PROGRAM

## 2022-04-21 PROCEDURE — 83735 ASSAY OF MAGNESIUM: CPT

## 2022-04-21 RX ORDER — FUROSEMIDE 40 MG/1
20 TABLET ORAL 2 TIMES DAILY
Qty: 60 TABLET | Refills: 3 | Status: SHIPPED | OUTPATIENT
Start: 2022-04-21 | End: 2022-06-02 | Stop reason: DRUGHIGH

## 2022-04-21 RX ORDER — ONDANSETRON 4 MG/1
4 TABLET, ORALLY DISINTEGRATING ORAL EVERY 8 HOURS PRN
Qty: 10 TABLET | Refills: 0 | Status: SHIPPED | OUTPATIENT
Start: 2022-04-21 | End: 2022-05-31

## 2022-04-21 RX ORDER — POTASSIUM CHLORIDE 20 MEQ/1
20 TABLET, EXTENDED RELEASE ORAL DAILY
Qty: 60 TABLET | Refills: 0 | Status: ON HOLD | OUTPATIENT
Start: 2022-04-21 | End: 2022-04-27 | Stop reason: HOSPADM

## 2022-04-21 RX ORDER — SPIRONOLACTONE 50 MG/1
50 TABLET, FILM COATED ORAL EVERY EVENING
Qty: 30 TABLET | Refills: 3 | Status: SHIPPED | OUTPATIENT
Start: 2022-04-21 | End: 2022-06-02 | Stop reason: DRUGHIGH

## 2022-04-21 RX ORDER — SPIRONOLACTONE 100 MG/1
100 TABLET, FILM COATED ORAL EVERY MORNING
Qty: 30 TABLET | Refills: 3 | Status: SHIPPED | OUTPATIENT
Start: 2022-04-21 | End: 2022-05-05 | Stop reason: ALTCHOICE

## 2022-04-21 RX ORDER — FLUOXETINE HYDROCHLORIDE 20 MG/1
20 CAPSULE ORAL DAILY
Qty: 30 CAPSULE | Refills: 3 | Status: SHIPPED | OUTPATIENT
Start: 2022-04-21

## 2022-04-21 RX ORDER — MIDODRINE HYDROCHLORIDE 5 MG/1
5 TABLET ORAL 3 TIMES DAILY PRN
Qty: 90 TABLET | Refills: 3 | Status: SHIPPED | OUTPATIENT
Start: 2022-04-21 | End: 2022-08-04

## 2022-04-21 RX ORDER — LACTULOSE 10 G/15ML
20 SOLUTION ORAL 3 TIMES DAILY
Qty: 473 ML | Refills: 1 | Status: SHIPPED | OUTPATIENT
Start: 2022-04-21 | End: 2022-05-31

## 2022-04-21 RX ORDER — HEPARIN SODIUM (PORCINE) LOCK FLUSH IV SOLN 100 UNIT/ML 100 UNIT/ML
300 SOLUTION INTRAVENOUS ONCE
Status: COMPLETED | OUTPATIENT
Start: 2022-04-21 | End: 2022-04-21

## 2022-04-21 RX ORDER — GREEN TEA/HOODIA GORDONII 315-12.5MG
1 CAPSULE ORAL 2 TIMES DAILY
Qty: 60 TABLET | Refills: 0 | Status: SHIPPED | OUTPATIENT
Start: 2022-04-21 | End: 2022-05-21

## 2022-04-21 RX ORDER — ATORVASTATIN CALCIUM 20 MG/1
20 TABLET, FILM COATED ORAL NIGHTLY
Qty: 30 TABLET | Refills: 3 | Status: SHIPPED | OUTPATIENT
Start: 2022-04-21

## 2022-04-21 RX ADMIN — SPIRONOLACTONE 100 MG: 25 TABLET, FILM COATED ORAL at 08:39

## 2022-04-21 RX ADMIN — NADOLOL 20 MG: 20 TABLET ORAL at 08:39

## 2022-04-21 RX ADMIN — RIFAXIMIN 550 MG: 550 TABLET ORAL at 08:39

## 2022-04-21 RX ADMIN — SODIUM CHLORIDE, PRESERVATIVE FREE 10 ML: 5 INJECTION INTRAVENOUS at 08:42

## 2022-04-21 RX ADMIN — SODIUM CHLORIDE, PRESERVATIVE FREE 40 MG: 5 INJECTION INTRAVENOUS at 08:39

## 2022-04-21 RX ADMIN — Medication 3 MG: at 00:07

## 2022-04-21 RX ADMIN — LACTULOSE 20 G: 20 SOLUTION ORAL at 08:39

## 2022-04-21 RX ADMIN — POTASSIUM CHLORIDE 40 MEQ: 20 TABLET, EXTENDED RELEASE ORAL at 10:54

## 2022-04-21 RX ADMIN — HEPARIN 300 UNITS: 100 SYRINGE at 10:54

## 2022-04-21 RX ADMIN — FLUOXETINE HYDROCHLORIDE 20 MG: 20 CAPSULE ORAL at 08:39

## 2022-04-21 RX ADMIN — FUROSEMIDE 40 MG: 40 TABLET ORAL at 08:39

## 2022-04-21 ASSESSMENT — PAIN SCALES - GENERAL
PAINLEVEL_OUTOF10: 0

## 2022-04-21 ASSESSMENT — ENCOUNTER SYMPTOMS
ABDOMINAL PAIN: 1
SHORTNESS OF BREATH: 0
ABDOMINAL DISTENTION: 1
NAUSEA: 1
VOMITING: 0

## 2022-04-21 NOTE — PLAN OF CARE
Problem: Falls - Risk of:  Goal: Will remain free from falls  Description: Will remain free from falls  Outcome: Progressing  Note: Patient has ambulated to and from commode multiple times this shift, tolerating ambulation well. Patient remains free from falls. Problem: Activity:  Goal: Fatigue will decrease  Description: Fatigue will decrease  Outcome: Progressing     Problem: Activity:  Goal: Ability to tolerate increased activity will improve  Description: Ability to tolerate increased activity will improve  Outcome: Progressing     Problem: Bowel/Gastric:  Goal: Ability to achieve a regular elimination pattern will improve  Description: Ability to achieve a regular elimination pattern will improve  Outcome: Progressing  Note: Patient continues to have 3-4 bowel movements per day. Lactulose scheduled and is ordered to titrate BM's to 3 per day. Problem: Skin Integrity:  Goal: Signs of wound healing will improve  Description: Signs of wound healing will improve  Outcome: Progressing  Note: Patient's paracentesis site leaking upon assessment this shift, new dressing applied (2x2 gauze with tegaderm). No signs of redness or irritation noted this shift. Problem: Skin Integrity:  Goal: Will show no infection signs and symptoms  Description: Will show no infection signs and symptoms  Outcome: Progressing     Problem: Skin Integrity:  Goal: Absence of new skin breakdown  Description: Absence of new skin breakdown  Outcome: Progressing     Problem: Discharge Planning  Goal: Discharge to home or other facility with appropriate resources  Outcome: Progressing  Note: Patient to be discharged to [facility] 4/21/2022 at 12:00 for continued rehab.

## 2022-04-21 NOTE — CARE COORDINATION
Transport scheduled with [facility] for 12:00PM to [facility]. SW informed pt on discharge time. Pt informed SW that he would contact his family to inform them on his pickup time. Please complete LACY. Addendum: Transport moved up to 11:30AM. SW informed Helga Shepard RN.      Number for report: [phone]

## 2022-04-21 NOTE — PLAN OF CARE
Problem: Falls - Risk of:  Goal: Will remain free from falls  Description: Will remain free from falls  4/21/2022 1103 by Kike Mays RN  Outcome: Completed  4/21/2022 0434 by James Bosch RN  Outcome: Progressing  Note: Patient has ambulated to and from commode multiple times this shift, tolerating ambulation well. Patient remains free from falls. Goal: Absence of physical injury  Description: Absence of physical injury  Outcome: Completed     Problem: Activity:  Goal: Fatigue will decrease  Description: Fatigue will decrease  4/21/2022 1103 by Kike Mays RN  Outcome: Completed  4/21/2022 0434 by James Bosch RN  Outcome: Progressing  Goal: Ability to tolerate increased activity will improve  Description: Ability to tolerate increased activity will improve  4/21/2022 1103 by Kike Mays RN  Outcome: Completed  4/21/2022 0434 by James Bosch RN  Outcome: Progressing  Goal: Ability to maintain optimal joint mobility will improve  Description: Ability to maintain optimal joint mobility will improve  Outcome: Completed     Problem: Bowel/Gastric:  Goal: Ability to achieve a regular elimination pattern will improve  Description: Ability to achieve a regular elimination pattern will improve  4/21/2022 1103 by Kike Mays RN  Outcome: Completed  4/21/2022 0434 by James Bosch RN  Outcome: Progressing  Note: Patient continues to have 3-4 bowel movements per day. Lactulose scheduled and is ordered to titrate BM's to 3 per day.      Problem: Cardiac:  Goal: Ability to maintain an adequate cardiac output will improve  Description: Ability to maintain an adequate cardiac output will improve  Outcome: Completed  Goal: Ability to maintain adequate ventilation will improve  Description: Ability to maintain adequate ventilation will improve  Outcome: Completed  Goal: Ability to achieve and maintain adequate cardiopulmonary perfusion will improve  Description: Ability to achieve and maintain adequate cardiopulmonary perfusion will improve  Outcome: Completed     Problem: Coping:  Goal: Ability to adjust to condition or change in health will improve  Description: Ability to adjust to condition or change in health will improve  Outcome: Completed  Goal: Communication of feelings regarding changes in body function or appearance will improve  Description: Communication of feelings regarding changes in body function or appearance will improve  Outcome: Completed     Problem: Health Behavior:  Goal: Identification of resources available to assist in meeting health care needs will improve  Description: Identification of resources available to assist in meeting health care needs will improve  Outcome: Completed     Problem: Nutritional:  Goal: Maintenance of adequate nutrition will improve  Description: Maintenance of adequate nutrition will improve  Outcome: Completed     Problem: Physical Regulation:  Goal: Signs and symptoms of infection will decrease  Description: Signs and symptoms of infection will decrease  Outcome: Completed  Goal: Will show no signs and symptoms of excessive bleeding  Description: Will show no signs and symptoms of excessive bleeding  Outcome: Completed  Goal: Complications related to the disease process, condition or treatment will be avoided or minimized  Description: Complications related to the disease process, condition or treatment will be avoided or minimized  Outcome: Completed     Problem: Safety:  Goal: Ability to remain free from injury will improve  Description: Ability to remain free from injury will improve  Outcome: Completed     Problem: Sensory:  Goal: Pain level will decrease  Description: Pain level will decrease  Outcome: Completed  Goal: General experience of comfort will improve  Description: General experience of comfort will improve  Outcome: Completed     Problem: Skin Integrity:  Goal: Skin integrity will improve  Description: Skin integrity will improve  Outcome: Completed  Goal: Signs of wound healing will improve  Description: Signs of wound healing will improve  4/21/2022 1103 by Marcella Su RN  Outcome: Completed  4/21/2022 0434 by Barrett Zhang RN  Outcome: Progressing  Note: Patient's paracentesis site leaking upon assessment this shift, new dressing applied (2x2 gauze with tegaderm). No signs of redness or irritation noted this shift.   Goal: Will show no infection signs and symptoms  Description: Will show no infection signs and symptoms  4/21/2022 1103 by Marcella Su RN  Outcome: Completed  4/21/2022 0434 by Barrett Zhang RN  Outcome: Progressing  Goal: Absence of new skin breakdown  Description: Absence of new skin breakdown  4/21/2022 1103 by Marcella Su RN  Outcome: Completed  4/21/2022 0434 by Barrett Zhang RN  Outcome: Progressing     Problem: Tissue Perfusion:  Goal: Ability to maintain adequate tissue perfusion will improve  Description: Ability to maintain adequate tissue perfusion will improve  Outcome: Completed  Goal: Ability to maintain a stable neurologic state will improve  Description: Ability to maintain a stable neurologic state will improve  Outcome: Completed     Problem: Pain:  Goal: Pain level will decrease  Description: Pain level will decrease  Outcome: Completed  Goal: Control of acute pain  Description: Control of acute pain  Outcome: Completed  Goal: Control of chronic pain  Description: Control of chronic pain  Outcome: Completed     Problem: Musculor/Skeletal Functional Status  Goal: Highest potential functional level  Outcome: Completed  Goal: Absence of falls  Outcome: Completed     Problem: Nutrition  Goal: Optimal nutrition therapy  Outcome: Completed     Problem: Discharge Planning  Goal: Discharge to home or other facility with appropriate resources  4/21/2022 1103 by Marcella Su RN  Outcome: Completed  4/21/2022 0434 by Barrett Zhang RN  Outcome: Progressing  Note: Patient to be discharged to [facility] 4/21/2022 at 12:00 for continued rehab. Problem: Cardiovascular - Adult  Goal: Maintains optimal cardiac output and hemodynamic stability  Outcome: Completed  Goal: Absence of cardiac dysrhythmias or at baseline  Outcome: Completed     Problem: Skin/Tissue Integrity - Adult  Goal: Skin integrity remains intact  Outcome: Completed  Goal: Incisions, wounds, or drain sites healing without S/S of infection  Outcome: Completed  Goal: Oral mucous membranes remain intact  Outcome: Completed     Problem: Musculoskeletal - Adult  Goal: Return mobility to safest level of function  Outcome: Completed  Goal: Maintain proper alignment of affected body part  Outcome: Completed  Goal: Return ADL status to a safe level of function  Outcome: Completed     Problem: Metabolic/Fluid and Electrolytes - Adult  Goal: Electrolytes maintained within normal limits  Outcome: Completed  Goal: Hemodynamic stability and optimal renal function maintained  Outcome: Completed     Problem: Coping  Goal: Pt/Family able to verbalize concerns and demonstrate effective coping strategies  Description: INTERVENTIONS:  1. Assist patient/family to identify coping skills, available support systems and cultural and spiritual values  2. Provide emotional support, including active listening and acknowledgement of concerns of patient and caregivers  3. Reduce environmental stimuli, as able  4. Instruct patient/family in relaxation techniques, as appropriate  5.  Assess for spiritual pain/suffering and initiate Spiritual Care, Psychosocial Clinical Specialist consults as needed  Outcome: Completed

## 2022-04-21 NOTE — PROGRESS NOTES
Patient discharged to [address] [place] [address] [address] per [place] with belongings. Writer attempts to  call report to [address] [place] [address] [address], left on hold, and then disconnected.

## 2022-04-21 NOTE — PROGRESS NOTES
[address] [employer]    PROGRESS NOTE             4/21/2022    7:34 AM    Name:   Mohsen Parks  MRN:     [id number]     Acct:      [de-identified]   Room:   2115/2115-01  IP Day:  8  Admit Date:  4/13/2022 10:10 AM    PCP:  VASU Hays  Code Status:  Full Code    Subjective:     C/C:   Chief Complaint   Patient presents with    Shortness of Breath     Interval History Status: not changed. Patient seen and examined at bedside. Lying in bed comfortably, complains of some mild nausea and states that his abdomen feels very distended this morning. Advised to follow-up with GI consistently as an outpatient. And explained to them about the weekly paracentesis. Transport set up at noon today. Brief History:     The patient is a 58 y.o.  Non- / non  male with PMH of alcoholic liver disease/cirrhosis, history of hepatitis C (treated).  He is a former smoker with 50+ pack year history, and admits to former heavy alcohol use.  Denies illicit or IV drug use. Patient also has a history of GE junction adenocarcinoma s/p chemoradiation completed 6/9/21 and PET 7/23/21 with no recurrence, as well as most recent EGD on 1/21/22 which was negative for malignanacy.  This EGD also showed severe portal hypertensive gastropathy with some oozing of blood visualized.     He presented to the emergency department today from pain management clinic where he was scheduled to have an epidural steroid injection for back pain due to spinal stenosis.  However, he was unable to lie prone due to abdominal distention and shortness of breath and he was sent to the emergency department.      According to the patient, he was scheduled for therapeutic paracentesis today at [facility] this afternoon.  His most recent paracentesis was 4/5/2022 with 7.5 L removed.  Per patient, he states that it accumulated quickly afterwards and came back Nunez Isai. \"  This was his second time having paracentesis done; previous paracentesis done in 2016.  He states that he has not been taking his spironolactone for the past few days, as he felt that it was making his blood pressure too low.  He reports that he took all other home medications, though.     Currently, patient states he has some shortness of breath which she attributes to abdominal distention, as well as nausea. However, denies  fever, chills, chest pain, abdominal pain, hematuria, or dark/bloody stool.  Last bowel movement was yesterday     In the emergency department, hemoglobin was found to be 3.6, recheck 3.5. Sodium 120.  Creatinine 1.42, baseline within normal limits.  Total bili 2.4.  INR 1.5.       He was given a dose of Protonix and 1 unit PRBCs in the ED.     He is being admitted to the hospital for the management of severe anemia and sepsis.     4/13: pt received 3 units PRBCs, venofer added per heme/onc     4/14: IR guided paracentesis 5L removed, FOBT positive, bowel movement revealed bright red blood per rectum; hgb 6.9 despite receiving 6 units of blood and FFP, emergent EGD overnight following additional episodes of hematochezia, no annamaria bleeding noted, may need colonoscopy     4/15: more bloody stools, plan for colonoscopy tomorrow, may need nuclear bleeding scan, as well     4/16: colonoscopy, no source of active bleed visualized, hemoglobin stabilizing, platelets trending down, hyponatremia improving but still low     4/17: hgb stable, advance diet slowly     4/18: liver u/s today to assess for portal vein thrombosis, spironolactone, lasix, and nadolol restarted, paracentesis today 4.8L removed    4/19: liver u/s showed patent portal vein, pt tolerating diet, platelets improving    4/20: results of colon biopsies consistent with tubular adenoma and adenoma (low grade dysplasia) with acute inflammation, platelets decreasing    4/21: Patient having nausea this morning. No vomiting.  Denies bloody stools, last BM about 230am.     Review of Systems:     Review of Systems   Constitutional: Negative for chills and fatigue. Respiratory: Negative for shortness of breath. Cardiovascular: Negative for chest pain and leg swelling. Gastrointestinal: Positive for abdominal distention, abdominal pain (from nausea) and nausea. Negative for vomiting. Neurological: Negative for dizziness, light-headedness and headaches. All other systems reviewed and are negative. Medications: Allergies:     Allergies   Allergen Reactions    Bee Pollen Other (See Comments)     Runny nose, watery eyes    Pollen Extract      Runny nose, watery eyes       Current Meds:   Scheduled Meds:    furosemide  40 mg Oral QAM    [Held by provider] furosemide  20 mg Oral QPM    spironolactone  100 mg Oral QAM    spironolactone  50 mg Oral QPM    pantoprazole (PROTONIX) 40 mg injection  40 mg IntraVENous Q12H    nadolol  20 mg Oral Daily    sodium chloride flush  5-40 mL IntraVENous 2 times per day    atorvastatin  20 mg Oral Nightly    FLUoxetine  20 mg Oral Daily    sodium chloride flush  5-40 mL IntraVENous BID    rifAXIMin  550 mg Oral BID    lactulose  20 g Oral TID     Continuous Infusions:    sodium chloride      sodium chloride      sodium chloride       PRN Meds: melatonin, morphine, sodium chloride, oxyCODONE, potassium chloride **OR** potassium alternative oral replacement **OR** potassium chloride, magnesium sulfate, sodium chloride, sodium chloride flush, sodium chloride, ondansetron **OR** ondansetron, polyethylene glycol, midodrine    Data:     Past Medical History:   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 ([facility] [address] Anxiety, Arthritis, Back pain, chronic, Lezama esophagus, BPH (benign prostatic hypertrophy), Cholelithiasis, Cirrhosis ([address] COVID-19, COVID-19 vaccine series completed, DDD (degenerative disc disease), lumbar, Depression, Esophageal cancer ([address] Esophageal varices ([address] Fatty liver, 04/21/2022    CO2 23 04/21/2022    BUN 8 04/21/2022    LABALBU 2.9 04/15/2022    LABALBU 4.7 04/19/2012    CREATININE <0.40 04/21/2022    CALCIUM 7.5 04/21/2022    GFRAA Can not be calculated 04/21/2022    LABGLOM Can not be calculated 04/21/2022    GLUCOSE 100 04/21/2022    GLUCOSE 65 04/19/2012       Radiology:    US LIVER    Result Date: 4/19/2022  EXAMINATION: RIGHT UPPER QUADRANT ULTRASOUND 4/19/2022 9:24 am COMPARISON: None. HISTORY: ORDERING SYSTEM PROVIDED HISTORY: evaluate portal/hepatic vein, r/o PVT TECHNOLOGIST PROVIDED HISTORY: evaluate portal/hepatic vein, r/o PVT FINDINGS: LIVER:  Nodular margins of the liver would suggest hepatic cirrhosis. No focal lesion. Liver 15.6 cm in length. Hepatopetal flow portal vein. BILIARY SYSTEM:  Gallstones and sludge within the gallbladder. No wall thickening. Negative sonographic Silva's sign. Common bile duct is within normal limits measuring 4.6 mm. OTHER: Moderate ascites visualized right upper quadrant. 1. Hepatic cirrhosis. No focal mass. Hepatopetal flow portal vein. 2. Gallstones and sludge within the gallbladder. 3. Ascites RECOMMENDATIONS: Unavailable     XR CHEST PORTABLE    Result Date: 4/15/2022  EXAMINATION: ONE XRAY VIEW OF THE CHEST 4/15/2022 7:59 am COMPARISON: 02/02/2022, 12/21/2021 HISTORY: ORDERING SYSTEM PROVIDED HISTORY: Dyspnea TECHNOLOGIST PROVIDED HISTORY: Dyspnea Reason for Exam: Dyspnea FINDINGS: Right chest port terminates in the superior vena cava. Mild pulmonary vascular congestion. No focal pulmonary opacities. Calcified granulomata and calcified mediastinal nodes from prior granulomatous infection. Cardiomegaly which is similar to 4 months prior. No acute bony findings. Mild pulmonary vascular congestion. Cardiomegaly.      IR US GUIDED PARACENTESIS    Result Date: 4/18/2022  PROCEDURE: PARACENTESIS WITH IMAGE GUIDANCE US ABDOMEN LIMITED 4/18/2022 HISTORY: ORDERING SYSTEM PROVIDED HISTORY: worsening acites and SOB TECHNOLOGIST PROVIDED HISTORY: worsening acites and SOB Please remove no more than 5 L TECHNIQUE: Informed consent was obtained after a detailed explanation of the procedure including risks, benefits, and alternatives. Universal protocol was followed. A limited ultrasound of the abdomen was performed and shows large amount of ascites. The right abdomen was prepped and draped in sterile fashion and local anesthesia was achieved with lidocaine. A 5 [place]yan[place] needle sheath was advanced into ascites using realtime ultrasound guidance and paracentesis was performed. The patient tolerated the procedure well. EBL: None FINDINGS: Limited ultrasound of the abdomen demonstrates ascites. A total of 4.8 L of clear yellow fluid was removed. Successful ultrasound guided paracentesis. IR US GUIDED PARACENTESIS    Result Date: 4/14/2022  PROCEDURE: PARACENTESIS WITH IMAGE GUIDANCE US ABDOMEN LIMITED 4/14/2022 HISTORY: ORDERING SYSTEM PROVIDED HISTORY: ascites remove 5 liters only TECHNOLOGIST PROVIDED HISTORY: ascites remove 5 liters only TECHNIQUE: Informed consent was obtained after a detailed explanation of the procedure including risks, benefits, and alternatives. Universal protocol was followed. A limited ultrasound of the abdomen was performed and shows a moderate to large amount of ascites. The right abdomen was prepped and draped in sterile fashion and local anesthesia was achieved with lidocaine. A 5 Gu[place]ese needle sheath was advanced into ascites using realtime ultrasound guidance and paracentesis was performed. The patient tolerated the procedure well. EBL: None FINDINGS: Limited ultrasound of the abdomen demonstrates ascites. A total of 5 L of slightly turbid yellow colored fluid was removed. Additional fluid remains on completion. Successful ultrasound-guided paracentesis.      IR US GUIDED PARACENTESIS    Result Date: 4/5/2022  PROCEDURE: IR US GUIDED PARACENTESIS W IMAGING 4/5/2022 HISTORY: ORDERING normal.         Behavior: Behavior normal.           Assessment:        Primary Problem  Gastrointestinal hemorrhage with melena    Active Hospital Problems    Diagnosis Date Noted    Anemia [D64.9] 04/13/2022    Acute kidney injury ([facility] [address] [N17.9] 04/13/2022    Esophageal adenocarcinoma ([facility] [address] [C15.9]     Former smoker, 50+ pack years, quit 2016 [Z87.891] 12/20/2021    Spinal stenosis of lumbar region with neurogenic claudication [M48.062] 12/14/2021    Malignant neoplasm of lower third of esophagus ([facility] [address] [C15.5]     Gastrointestinal hemorrhage with melena [K92.1]     Hepatitis C virus infection resolved after antiviral drug therapy [Z86.19] 12/23/2020    Hepatic cirrhosis ([facility] [address] [K74.60] 09/15/2016    GERD (gastroesophageal reflux disease) [K21.9] 04/19/2012       Plan:        Severe anemia in the setting of cirrhosis and portal hypertension, likely secondary to GI bleed, improved  -Hemoglobin 3.5 on admission, 8.6 this morning  -Has received 7 units PRBCs and 1 unit FFP total since admission (+1 unit in ED)  -Protonix drip switched to 40mg IV BID  -FOBT positive and pt was having melena/hematochezia; no longer appears to be having bloody bowel movements  -EGD and colonoscopy did not reveal source of active bleed  -Monitor hgb, transfuse if <7  -GI is following     Thrombocytopenia, worsening  -platelets 489 on admission  -Has received 7 units of blood, 1 unit FFP  -Platelets this morning 63, decreased  -Likely 2/2 liver disease  -does not appear to be bleeding currently, hgb stable  -heme onc on board, appreciate recommendations       Hyponatremia, stable  -Sodium 120 on admission, 132 this AM  -Pt is not chronically hyponatremic  -Patient is likely intravascularly volume depleted 2/2 third spacing from ascites  -continue to monitor     Ascites 2/2 cirrhotic liver disease  -cirrhosis 2/2 heavy alcohol use in the past; patient also has history of hepatitis C s/p treatment  -Patient had IR guided paracentesis on 4/5/2022 and had rapid reaccumulation of ascitic fluid during this time; was scheduled for paracentesis on day of admission  -s/p IR guided paracentesis 4/14; 5L max removed per GI recommendations, fluid has reaccumulated   -IR guided paracentesis also done 4/18; 4.8L removed  -liver u/s showed patency of portal vein, no thrombosis     Acute kidney injury, resolved  -pt does not have history of CKD or hepatorenal syndrome  -Creatinine 1.42 on admission, wnl now  -FeUrea 19.5% indicative of prerenal injury     Hypokalemia, resolved, continue to monitor and replace per protocol as needed, 3.5 this morning - will replace     Portal hypertension, with findings of portal hypertensive gastropathy on EGD, home lasix increased from 20 to 40mg daily, nadolol 20mg daily restarted, spironolactone increased from 50 to 100mg daily      History of GE junction adenocarcinoma, in remission  -pt completed chemoradiation 6/9/2021; PET scan 7/23/21 showed no recurrence  -Most recent EGD done 1/21/22: flat 3-4mm polyp at GE junction which showed no malignancy  -Polypoid lesion seen on EGD 4/15, not biopsied because of bleeding risk, will need outpatient follow up  -Colonoscopy biopsies from 4/16: 1) Colon, sigmoid, colonoscopic biopsy:   Adenoma (low-grade dysplasia) with acute inflammation. 2) Colon, right, colonoscopic polypectomy: Tubular adenoma.      GI PPx: IV protonix 40mg BID  DVT prophylaxis: SCD cuffs, no chemical anticoagulation at this time d/t risk for bleeding  Diet: regular diet, low sodium    Pt will need regular paracentesis outpatient and requests that these be coordinated with his pain management appointments so he is able to lie prone on his abdomen during procedures.      Tati Acevedo MD  4/21/2022  7:34 AM

## 2022-04-21 NOTE — PROGRESS NOTES
Govind Carbone MD/John Betancourt MD/ Rod Browning MD/Quentin aCceres APRN AGACNP-BC, NP-C      Morales Bernal APRN NP-C     Chaz Christianson APRN NP-C                                           Pulmonary Progress Note    Patient - Dorcas Ortiz   Age - 57 y.o.   - 1960  MRN - [id number]  Acct # - [de-identified]  Date of Admission - 2022 10:10 AM    Consulting Service/Physician:       Primary Care Physician: VASU Kapoor    SUBJECTIVE:     Chief Complaint:   Chief Complaint   Patient presents with    Shortness of Breath     Subjective:    He states he feels a little more short of breath today, but thinks he is filling up with abdominal fluid again, states last night he had a leak from prior paracentesis which was completed on 22 with removal of 4.8L    He has not required any oxygen. He is awaiting discharge later today to SNF. Awaiting GI re eval re: poss paracentesis prior to discharge    VITALS  BP (!) 96/51   Pulse 55   Temp 98.5 °F (36.9 °C) (Oral)   Resp 18   Ht 5' 10\" (1.778 m)   Wt 228 lb 6.3 oz (103.6 kg)   SpO2 96%   BMI 32.77 kg/m²   Wt Readings from Last 3 Encounters:   22 228 lb 6.3 oz (103.6 kg)   22 210 lb (95.3 kg)   22 198 lb (89.8 kg)     I/O (24 Hours)    Intake/Output Summary (Last 24 hours) at 2022 0914  Last data filed at 2022 0426  Gross per 24 hour   Intake --   Output 750 ml   Net -750 ml     Ventilator:      Exam:   Physical Exam   Constitutional:  Oriented to person, place, and time. Appears well-developed and well-nourished. Lying in bed looks slightly uncomfortable, but not in distress  HENT: Unremarkable  Head: Normocephalic and atraumatic. Eyes: EOM are normal. Pupils are equal, round, and reactive to light. Neck: Neck supple. Cardiovascular:  Regular rate and rhythm. Normal heart tones. No JVD.     Pulmonary/Chest: Effort normal and breath sounds slightly diminished, no rales or rhonchi, respirations easy and nonlabored  Abdominal: Distended, Bowel sounds are hypoactive, + for ascites  Musculoskeletal: Normal range of motion. Some generalized weakness  Neurological:patient is alert and oriented to person, place, and time. Skin: Skin is warm and dry.    Extremities: No edema   Infusions:      sodium chloride      sodium chloride      sodium chloride       Meds:     Current Facility-Administered Medications:     furosemide (LASIX) tablet 40 mg, 40 mg, Oral, QAM, Darien Leaven, APRN - NP, 40 mg at 04/21/22 0839    [Held by provider] furosemide (LASIX) tablet 20 mg, 20 mg, Oral, QPM, Darien Leaven, APRN - NP    spironolactone (ALDACTONE) tablet 100 mg, 100 mg, Oral, QAM, Darien Leaven, APRN - NP, 100 mg at 04/21/22 4764    spironolactone (ALDACTONE) tablet 50 mg, 50 mg, Oral, QPM, Darien Leaven, APRN - NP, 50 mg at 04/20/22 1818    melatonin tablet 3 mg, 3 mg, Oral, Nightly PRN, Chai Moon APRN - CNP, 3 mg at 04/21/22 0007    pantoprazole (PROTONIX) 40 mg in sodium chloride (PF) 10 mL injection, 40 mg, IntraVENous, Q12H, Jessika Ramos MD, 40 mg at 04/21/22 0839    nadolol (CORGARD) tablet 20 mg, 20 mg, Oral, Daily, Gab Martinez MD, 20 mg at 04/21/22 0839    morphine (PF) injection 2 mg, 2 mg, IntraVENous, Q4H PRN, Jeannette London MD, 2 mg at 04/20/22 0025    0.9 % sodium chloride infusion, , IntraVENous, PRN, Robert Padgett MD    oxyCODONE (ROXICODONE) immediate release tablet 5 mg, 5 mg, Oral, Q6H PRN, Aggie Calle MD, 5 mg at 04/18/22 0238    potassium chloride (KLOR-CON M) extended release tablet 40 mEq, 40 mEq, Oral, PRN, 40 mEq at 04/20/22 0713 **OR** potassium bicarb-citric acid (EFFER-K) effervescent tablet 40 mEq, 40 mEq, Oral, PRN **OR** potassium chloride 10 mEq/100 mL IVPB (Peripheral Line), 10 mEq, IntraVENous, PRN, Robert Padgett MD    magnesium sulfate 1000 mg in dextrose 5% 100 mL IVPB, 1,000 mg, IntraVENous, PRN, Jayden Del Valle MD, Stopped at 04/20/22 1552    0.9 % sodium chloride infusion, , IntraVENous, PRN, Jayden Del Valle MD    sodium chloride flush 0.9 % injection 5-40 mL, 5-40 mL, IntraVENous, 2 times per day, Jayden Del Valle MD, 10 mL at 04/20/22 1111    sodium chloride flush 0.9 % injection 5-40 mL, 5-40 mL, IntraVENous, PRN, Jayden Del Valle MD, 10 mL at 04/20/22 0025    0.9 % sodium chloride infusion, 25 mL, IntraVENous, PRN, Jayden Del Valle MD    ondansetron (ZOFRAN-ODT) disintegrating tablet 4 mg, 4 mg, Oral, Q8H PRN **OR** ondansetron (ZOFRAN) injection 4 mg, 4 mg, IntraVENous, Q6H PRN, Jayden Del Valle MD, 4 mg at 04/14/22 0117    polyethylene glycol (GLYCOLAX) packet 17 g, 17 g, Oral, Daily PRN, Jayden Del Valle MD    midodrine (PROAMATINE) tablet 5 mg, 5 mg, Oral, TID PRN, Jayden Del Valle MD, 5 mg at 04/19/22 0900    atorvastatin (LIPITOR) tablet 20 mg, 20 mg, Oral, Nightly, Jayden Del Valle MD, 20 mg at 04/20/22 2044    FLUoxetine (PROZAC) capsule 20 mg, 20 mg, Oral, Daily, Lucian Harley MD, 20 mg at 04/21/22 0839    sodium chloride flush 0.9 % injection 5-40 mL, 5-40 mL, IntraVENous, BID, Jayden Del Valle MD, 10 mL at 04/21/22 0842    rifaximin (XIFAXAN) tablet 550 mg, 550 mg, Oral, BID, Jayden Del Valle MD, 550 mg at 04/21/22 0839    lactulose (CHRONULAC) 10 GM/15ML solution 20 g, 20 g, Oral, TID, Jayden Del Valle MD, 20 g at 04/21/22 0839    Lab Results:     Lab Results   Component Value Date    WBC 6.7 04/21/2022    HGB 8.6 (L) 04/21/2022    HCT 27.0 (L) 04/21/2022    MCV 88.8 04/21/2022    PLT 98 (L) 04/21/2022     Lab Results   Component Value Date    CALCIUM 7.5 (L) 04/21/2022     (L) 04/21/2022    K 3.5 (L) 04/21/2022    CO2 23 04/21/2022     04/21/2022    BUN 8 04/21/2022    CREATININE <0.40 (L) 04/21/2022       Lab Results   Component Value Date    INR 1.5 04/13/2022    PROTIME 18.3 (H) 04/13/2022       Radiology:       ASSESSMENT:       1. GI bleed  2. Anemia of acute blood loss/iron deficiency  3. Hepatic cirrhosis  4. Hx of hep C - resolved with antiviral therapy  5. Recent paracentesis 4/18/22, 4/14/22 and 4/5/22  6. Abdominal ascites  7. Esophageal adenocarcinoma s/p chemoradiation with good results, f/u oncology  8. Former smoker, quitting in 2016  9. Full code  PLAN:   1. Diuretics per GI on lasix and aldactone  2. May benefit from paracentesis prior to discharge to facility  3. Cont on xifaxan and lactulose  4. Pulmonary wise has been stable has not required any oxygen  5. Defer discharge to other services, noted plans to discharge to [facility]      Electronically signed by MASSIMO Jennings CNP on 04/21/22     This progress note was completed using a voice transcription system. Every effort was made to ensure accuracy. However, inadvertent computerized transcription errors may be present.     KACIE KEYS AGACNP-BC, NP-C  [facility] Pulmonary, Critical Care & Sleep

## 2022-04-25 ENCOUNTER — APPOINTMENT (OUTPATIENT)
Dept: INFUSION THERAPY | Age: 62
End: 2022-04-25
Payer: MEDICARE

## 2022-04-25 ENCOUNTER — TELEPHONE (OUTPATIENT)
Dept: GASTROENTEROLOGY | Age: 62
End: 2022-04-25

## 2022-04-25 ENCOUNTER — HOSPITAL ENCOUNTER (OUTPATIENT)
Dept: INFUSION THERAPY | Age: 62
Discharge: HOME | End: 2022-04-25
Payer: MEDICARE

## 2022-04-25 ENCOUNTER — TELEPHONE (OUTPATIENT)
Dept: ONCOLOGY | Age: 62
End: 2022-04-25

## 2022-04-25 ENCOUNTER — HOSPITAL ENCOUNTER (OUTPATIENT)
Dept: CT IMAGING | Age: 62
Discharge: HOME | End: 2022-04-27
Payer: MEDICARE

## 2022-04-25 DIAGNOSIS — C15.9 ESOPHAGEAL ADENOCARCINOMA (HCC): ICD-10-CM

## 2022-04-25 DIAGNOSIS — C15.9 ESOPHAGEAL ADENOCARCINOMA (HCC): Primary | ICD-10-CM

## 2022-04-25 PROCEDURE — 6360000002 HC RX W HCPCS: Performed by: INTERNAL MEDICINE

## 2022-04-25 PROCEDURE — 2580000003 HC RX 258: Performed by: INTERNAL MEDICINE

## 2022-04-25 PROCEDURE — 71260 CT THORAX DX C+: CPT

## 2022-04-25 PROCEDURE — 6360000004 HC RX CONTRAST MEDICATION: Performed by: INTERNAL MEDICINE

## 2022-04-25 RX ORDER — SODIUM CHLORIDE 0.9 % (FLUSH) 0.9 %
5-40 SYRINGE (ML) INJECTION PRN
Status: CANCELLED | OUTPATIENT
Start: 2022-04-25

## 2022-04-25 RX ORDER — HEPARIN SODIUM (PORCINE) LOCK FLUSH IV SOLN 100 UNIT/ML 100 UNIT/ML
500 SOLUTION INTRAVENOUS PRN
Status: DISCONTINUED | OUTPATIENT
Start: 2022-04-25 | End: 2022-04-26 | Stop reason: HOSPADM

## 2022-04-25 RX ORDER — SODIUM CHLORIDE 9 MG/ML
25 INJECTION, SOLUTION INTRAVENOUS PRN
Status: CANCELLED | OUTPATIENT
Start: 2022-04-25

## 2022-04-25 RX ORDER — 0.9 % SODIUM CHLORIDE 0.9 %
80 INTRAVENOUS SOLUTION INTRAVENOUS ONCE
Status: COMPLETED | OUTPATIENT
Start: 2022-04-25 | End: 2022-04-25

## 2022-04-25 RX ORDER — SODIUM CHLORIDE 0.9 % (FLUSH) 0.9 %
5-40 SYRINGE (ML) INJECTION PRN
Status: DISCONTINUED | OUTPATIENT
Start: 2022-04-25 | End: 2022-04-26 | Stop reason: HOSPADM

## 2022-04-25 RX ORDER — SODIUM CHLORIDE 0.9 % (FLUSH) 0.9 %
10 SYRINGE (ML) INJECTION PRN
Status: DISCONTINUED | OUTPATIENT
Start: 2022-04-25 | End: 2022-04-28 | Stop reason: HOSPADM

## 2022-04-25 RX ORDER — HEPARIN SODIUM (PORCINE) LOCK FLUSH IV SOLN 100 UNIT/ML 100 UNIT/ML
500 SOLUTION INTRAVENOUS PRN
Status: CANCELLED | OUTPATIENT
Start: 2022-04-25

## 2022-04-25 RX ADMIN — IOHEXOL 50 ML: 240 INJECTION, SOLUTION INTRATHECAL; INTRAVASCULAR; INTRAVENOUS; ORAL at 11:05

## 2022-04-25 RX ADMIN — IOPAMIDOL 100 ML: 755 INJECTION, SOLUTION INTRAVENOUS at 11:05

## 2022-04-25 RX ADMIN — SODIUM CHLORIDE, PRESERVATIVE FREE 10 ML: 5 INJECTION INTRAVENOUS at 11:05

## 2022-04-25 RX ADMIN — SODIUM CHLORIDE 80 ML: 9 INJECTION, SOLUTION INTRAVENOUS at 11:05

## 2022-04-25 RX ADMIN — HEPARIN 500 UNITS: 100 SYRINGE at 11:11

## 2022-04-25 RX ADMIN — SODIUM CHLORIDE, PRESERVATIVE FREE 10 ML: 5 INJECTION INTRAVENOUS at 11:11

## 2022-04-25 RX ADMIN — SODIUM CHLORIDE, PRESERVATIVE FREE 10 ML: 5 INJECTION INTRAVENOUS at 09:28

## 2022-04-25 RX ADMIN — SODIUM CHLORIDE, PRESERVATIVE FREE 10 ML: 5 INJECTION INTRAVENOUS at 09:29

## 2022-04-25 NOTE — TELEPHONE ENCOUNTER
Writer consulted with Dr. Ana Felix and he advised writer to call the patient and get him an appointment on Wednesday or Thursday.

## 2022-04-25 NOTE — TELEPHONE ENCOUNTER
Pt here for a ct and thought he was here for a paracentesis. Pt states that dr Tommie Riley was ordering. Called and spoke with bubba at dr Rosario Cam office and she states to continue with ct and she will reach out to dr Tommie Riley to see if he want to order a paracentesis. Gómez Baeza states she will call pt later today after she speaks to dr Tommie Riley.      Pt notified of above conversation

## 2022-04-25 NOTE — TELEPHONE ENCOUNTER
Patient showed up today at the Infusion center at [facility] and thought he was there for a Paracentesis ordered by Dr Rashmi Luna. [facility] stating there was no order in for the paracentesis but a CT scan. They will do the CT but can you talk to Dr Rashmi Luna to see if he needs a Paracentesis. He was just in the hospital.   Last Paracentesis was done in March.

## 2022-04-25 NOTE — ONCOLOGY
Patient arrived for CT port access. Pt stable at discharge. Scheduled to return 5/5/22 for MD visit.

## 2022-04-26 ENCOUNTER — OFFICE VISIT (OUTPATIENT)
Dept: GASTROENTEROLOGY | Age: 62
End: 2022-04-26
Payer: MEDICARE

## 2022-04-26 ENCOUNTER — HOSPITAL ENCOUNTER (INPATIENT)
Age: 62
LOS: 2 days | Discharge: HOME HEALTH | DRG: 433 | End: 2022-04-28
Attending: EMERGENCY MEDICINE | Admitting: INTERNAL MEDICINE
Payer: MEDICARE

## 2022-04-26 ENCOUNTER — APPOINTMENT (OUTPATIENT)
Dept: GENERAL RADIOLOGY | Age: 62
DRG: 433 | End: 2022-04-26
Payer: MEDICARE

## 2022-04-26 ENCOUNTER — HOSPITAL ENCOUNTER (OUTPATIENT)
Age: 62
Setting detail: SPECIMEN
Discharge: HOME | End: 2022-04-26

## 2022-04-26 VITALS
DIASTOLIC BLOOD PRESSURE: 73 MMHG | BODY MASS INDEX: 33.57 KG/M2 | OXYGEN SATURATION: 100 % | HEIGHT: 70 IN | HEART RATE: 60 BPM | WEIGHT: 234.5 LBS | SYSTOLIC BLOOD PRESSURE: 131 MMHG

## 2022-04-26 DIAGNOSIS — K22.711 BARRETT'S ESOPHAGUS WITH HIGH GRADE DYSPLASIA: ICD-10-CM

## 2022-04-26 DIAGNOSIS — C80.1 ADENOCARCINOMA IN A POLYP (HCC): ICD-10-CM

## 2022-04-26 DIAGNOSIS — K70.31 ASCITES DUE TO ALCOHOLIC CIRRHOSIS (HCC): Primary | ICD-10-CM

## 2022-04-26 DIAGNOSIS — K70.30 ALCOHOLIC CIRRHOSIS OF LIVER WITHOUT ASCITES (HCC): ICD-10-CM

## 2022-04-26 DIAGNOSIS — R18.8 OTHER ASCITES: ICD-10-CM

## 2022-04-26 DIAGNOSIS — R06.02 SHORTNESS OF BREATH: Primary | ICD-10-CM

## 2022-04-26 LAB
ABSOLUTE EOS #: 0.25 K/UL (ref 0–0.4)
ABSOLUTE LYMPH #: 0.67 K/UL (ref 1–4.8)
ABSOLUTE MONO #: 1.01 K/UL (ref 0.1–1.3)
ALBUMIN SERPL-MCNC: 3.1 G/DL (ref 3.5–5.2)
ALP BLD-CCNC: 261 U/L (ref 40–129)
ALT SERPL-CCNC: 66 U/L (ref 5–41)
AMMONIA: 14 UMOL/L (ref 16–60)
ANION GAP SERPL CALCULATED.3IONS-SCNC: 10 MMOL/L (ref 9–17)
AST SERPL-CCNC: 95 U/L
BACTERIA: ABNORMAL
BASOPHILS # BLD: 1 % (ref 0–2)
BASOPHILS ABSOLUTE: 0.08 K/UL (ref 0–0.2)
BILIRUB SERPL-MCNC: 2.84 MG/DL (ref 0.3–1.2)
BILIRUBIN DIRECT: 1.43 MG/DL
BILIRUBIN URINE: ABNORMAL
BILIRUBIN, INDIRECT: 1.41 MG/DL (ref 0–1)
BUN BLDV-MCNC: 9 MG/DL (ref 8–23)
CALCIUM SERPL-MCNC: 8.9 MG/DL (ref 8.6–10.4)
CASTS UA: ABNORMAL /LPF
CHLORIDE BLD-SCNC: 102 MMOL/L (ref 98–107)
CO2: 21 MMOL/L (ref 20–31)
COLOR: ABNORMAL
CREAT SERPL-MCNC: 0.46 MG/DL (ref 0.7–1.2)
DATE, STOOL #1: ABNORMAL
EOSINOPHILS RELATIVE PERCENT: 3 % (ref 0–4)
EPITHELIAL CELLS UA: ABNORMAL /HPF
GFR AFRICAN AMERICAN: >60 ML/MIN
GFR NON-AFRICAN AMERICAN: >60 ML/MIN
GFR SERPL CREATININE-BSD FRML MDRD: ABNORMAL ML/MIN/{1.73_M2}
GLUCOSE BLD-MCNC: 88 MG/DL (ref 70–99)
GLUCOSE URINE: NEGATIVE
HCT VFR BLD CALC: 29.7 % (ref 41–53)
HEMOCCULT SP1 STL QL: POSITIVE
HEMOGLOBIN: 9.5 G/DL (ref 13.5–17.5)
INR BLD: 1.2
KETONES, URINE: NEGATIVE
LEUKOCYTE ESTERASE, URINE: ABNORMAL
LIPASE: 20 U/L (ref 13–60)
LYMPHOCYTES # BLD: 8 % (ref 24–44)
MAGNESIUM: 1.7 MG/DL (ref 1.6–2.6)
MCH RBC QN AUTO: 29.2 PG (ref 26–34)
MCHC RBC AUTO-ENTMCNC: 32 G/DL (ref 31–37)
MCV RBC AUTO: 91.2 FL (ref 80–100)
MONOCYTES # BLD: 12 % (ref 1–7)
MORPHOLOGY: ABNORMAL
MORPHOLOGY: ABNORMAL
NITRITE, URINE: NEGATIVE
PARTIAL THROMBOPLASTIN TIME: 34.6 SEC (ref 24–36)
PDW BLD-RTO: 25 % (ref 11.5–14.9)
PH UA: 5.5 (ref 5–8)
PLATELET # BLD: 272 K/UL (ref 150–450)
PMV BLD AUTO: 7.1 FL (ref 6–12)
POTASSIUM SERPL-SCNC: 4.6 MMOL/L (ref 3.7–5.3)
PRO-BNP: 540 PG/ML
PROTEIN UA: NEGATIVE
PROTHROMBIN TIME: 15.5 SEC (ref 11.8–14.6)
RBC # BLD: 3.25 M/UL (ref 4.5–5.9)
RBC UA: ABNORMAL /HPF
SEG NEUTROPHILS: 76 % (ref 36–66)
SEGMENTED NEUTROPHILS ABSOLUTE COUNT: 6.39 K/UL (ref 1.3–9.1)
SODIUM BLD-SCNC: 133 MMOL/L (ref 135–144)
SPECIFIC GRAVITY UA: 1.02 (ref 1–1.03)
TIME, STOOL #1: ABNORMAL
TOTAL PROTEIN: 5.4 G/DL (ref 6.4–8.3)
TROPONIN, HIGH SENSITIVITY: 13 NG/L (ref 0–22)
TROPONIN, HIGH SENSITIVITY: 14 NG/L (ref 0–22)
TURBIDITY: CLEAR
URINE HGB: NEGATIVE
UROBILINOGEN, URINE: NORMAL
WBC # BLD: 8.4 K/UL (ref 3.5–11)
WBC UA: ABNORMAL /HPF

## 2022-04-26 PROCEDURE — G0378 HOSPITAL OBSERVATION PER HR: HCPCS

## 2022-04-26 PROCEDURE — 83880 ASSAY OF NATRIURETIC PEPTIDE: CPT

## 2022-04-26 PROCEDURE — 36415 COLL VENOUS BLD VENIPUNCTURE: CPT

## 2022-04-26 PROCEDURE — 1200000000 HC SEMI PRIVATE

## 2022-04-26 PROCEDURE — 84484 ASSAY OF TROPONIN QUANT: CPT

## 2022-04-26 PROCEDURE — 2580000003 HC RX 258: Performed by: NURSE PRACTITIONER

## 2022-04-26 PROCEDURE — 82140 ASSAY OF AMMONIA: CPT

## 2022-04-26 PROCEDURE — 83690 ASSAY OF LIPASE: CPT

## 2022-04-26 PROCEDURE — 85730 THROMBOPLASTIN TIME PARTIAL: CPT

## 2022-04-26 PROCEDURE — G0328 FECAL BLOOD SCRN IMMUNOASSAY: HCPCS

## 2022-04-26 PROCEDURE — 99214 OFFICE O/P EST MOD 30 MIN: CPT | Performed by: INTERNAL MEDICINE

## 2022-04-26 PROCEDURE — P9603 ONE-WAY ALLOW PRORATED MILES: HCPCS

## 2022-04-26 PROCEDURE — 85025 COMPLETE CBC W/AUTO DIFF WBC: CPT

## 2022-04-26 PROCEDURE — 80076 HEPATIC FUNCTION PANEL: CPT

## 2022-04-26 PROCEDURE — 93005 ELECTROCARDIOGRAM TRACING: CPT | Performed by: STUDENT IN AN ORGANIZED HEALTH CARE EDUCATION/TRAINING PROGRAM

## 2022-04-26 PROCEDURE — 83735 ASSAY OF MAGNESIUM: CPT

## 2022-04-26 PROCEDURE — 6360000002 HC RX W HCPCS: Performed by: NURSE PRACTITIONER

## 2022-04-26 PROCEDURE — 80048 BASIC METABOLIC PNL TOTAL CA: CPT

## 2022-04-26 PROCEDURE — 71045 X-RAY EXAM CHEST 1 VIEW: CPT

## 2022-04-26 PROCEDURE — 96374 THER/PROPH/DIAG INJ IV PUSH: CPT

## 2022-04-26 PROCEDURE — 6370000000 HC RX 637 (ALT 250 FOR IP): Performed by: NURSE PRACTITIONER

## 2022-04-26 PROCEDURE — 99285 EMERGENCY DEPT VISIT HI MDM: CPT

## 2022-04-26 PROCEDURE — 85610 PROTHROMBIN TIME: CPT

## 2022-04-26 PROCEDURE — 99220 PR INITIAL OBSERVATION CARE/DAY 70 MINUTES: CPT | Performed by: INTERNAL MEDICINE

## 2022-04-26 PROCEDURE — 81001 URINALYSIS AUTO W/SCOPE: CPT

## 2022-04-26 RX ORDER — FLUOXETINE HYDROCHLORIDE 20 MG/1
20 CAPSULE ORAL DAILY
Status: DISCONTINUED | OUTPATIENT
Start: 2022-04-27 | End: 2022-04-28 | Stop reason: HOSPADM

## 2022-04-26 RX ORDER — MIDODRINE HYDROCHLORIDE 5 MG/1
5 TABLET ORAL 3 TIMES DAILY PRN
Status: DISCONTINUED | OUTPATIENT
Start: 2022-04-26 | End: 2022-04-28 | Stop reason: HOSPADM

## 2022-04-26 RX ORDER — SPIRONOLACTONE 25 MG/1
50 TABLET ORAL EVERY EVENING
Status: DISCONTINUED | OUTPATIENT
Start: 2022-04-26 | End: 2022-04-28 | Stop reason: HOSPADM

## 2022-04-26 RX ORDER — FUROSEMIDE 10 MG/ML
20 INJECTION INTRAMUSCULAR; INTRAVENOUS ONCE
Status: COMPLETED | OUTPATIENT
Start: 2022-04-26 | End: 2022-04-26

## 2022-04-26 RX ORDER — POTASSIUM CHLORIDE 20 MEQ/1
20 TABLET, EXTENDED RELEASE ORAL DAILY
Status: DISCONTINUED | OUTPATIENT
Start: 2022-04-27 | End: 2022-04-28 | Stop reason: HOSPADM

## 2022-04-26 RX ORDER — CYCLOBENZAPRINE HCL 10 MG
10 TABLET ORAL DAILY PRN
Status: DISCONTINUED | OUTPATIENT
Start: 2022-04-26 | End: 2022-04-28 | Stop reason: HOSPADM

## 2022-04-26 RX ORDER — LACTULOSE 10 G/15ML
20 SOLUTION ORAL 3 TIMES DAILY
Status: DISCONTINUED | OUTPATIENT
Start: 2022-04-26 | End: 2022-04-28 | Stop reason: HOSPADM

## 2022-04-26 RX ORDER — ONDANSETRON 4 MG/1
4 TABLET, ORALLY DISINTEGRATING ORAL EVERY 8 HOURS PRN
Status: DISCONTINUED | OUTPATIENT
Start: 2022-04-26 | End: 2022-04-28 | Stop reason: HOSPADM

## 2022-04-26 RX ORDER — NADOLOL 20 MG/1
20 TABLET ORAL DAILY
Status: DISCONTINUED | OUTPATIENT
Start: 2022-04-27 | End: 2022-04-28 | Stop reason: HOSPADM

## 2022-04-26 RX ORDER — SODIUM CHLORIDE 9 MG/ML
INJECTION, SOLUTION INTRAVENOUS PRN
Status: DISCONTINUED | OUTPATIENT
Start: 2022-04-26 | End: 2022-04-28 | Stop reason: HOSPADM

## 2022-04-26 RX ORDER — ACETAMINOPHEN 650 MG/1
650 SUPPOSITORY RECTAL EVERY 6 HOURS PRN
Status: DISCONTINUED | OUTPATIENT
Start: 2022-04-26 | End: 2022-04-27 | Stop reason: SDUPTHER

## 2022-04-26 RX ORDER — SODIUM CHLORIDE 0.9 % (FLUSH) 0.9 %
10 SYRINGE (ML) INJECTION PRN
Status: DISCONTINUED | OUTPATIENT
Start: 2022-04-26 | End: 2022-04-28 | Stop reason: HOSPADM

## 2022-04-26 RX ORDER — ACETAMINOPHEN 325 MG/1
650 TABLET ORAL EVERY 6 HOURS PRN
Status: DISCONTINUED | OUTPATIENT
Start: 2022-04-26 | End: 2022-04-27 | Stop reason: SDUPTHER

## 2022-04-26 RX ORDER — OXYCODONE HYDROCHLORIDE AND ACETAMINOPHEN 5; 325 MG/1; MG/1
1 TABLET ORAL DAILY PRN
COMMUNITY
End: 2022-05-05 | Stop reason: ALTCHOICE

## 2022-04-26 RX ORDER — SODIUM CHLORIDE 0.9 % (FLUSH) 0.9 %
5-40 SYRINGE (ML) INJECTION EVERY 12 HOURS SCHEDULED
Status: DISCONTINUED | OUTPATIENT
Start: 2022-04-26 | End: 2022-04-28 | Stop reason: HOSPADM

## 2022-04-26 RX ORDER — FERROUS SULFATE 325(65) MG
325 TABLET ORAL 2 TIMES DAILY
Status: DISCONTINUED | OUTPATIENT
Start: 2022-04-26 | End: 2022-04-28 | Stop reason: HOSPADM

## 2022-04-26 RX ORDER — ATORVASTATIN CALCIUM 20 MG/1
20 TABLET, FILM COATED ORAL NIGHTLY
Status: DISCONTINUED | OUTPATIENT
Start: 2022-04-26 | End: 2022-04-28 | Stop reason: HOSPADM

## 2022-04-26 RX ORDER — FUROSEMIDE 20 MG/1
20 TABLET ORAL 2 TIMES DAILY
Status: DISCONTINUED | OUTPATIENT
Start: 2022-04-27 | End: 2022-04-28 | Stop reason: HOSPADM

## 2022-04-26 RX ORDER — ONDANSETRON 2 MG/ML
4 INJECTION INTRAMUSCULAR; INTRAVENOUS EVERY 6 HOURS PRN
Status: DISCONTINUED | OUTPATIENT
Start: 2022-04-26 | End: 2022-04-28 | Stop reason: HOSPADM

## 2022-04-26 RX ORDER — OXYCODONE HYDROCHLORIDE AND ACETAMINOPHEN 5; 325 MG/1; MG/1
1 TABLET ORAL DAILY PRN
Status: DISCONTINUED | OUTPATIENT
Start: 2022-04-26 | End: 2022-04-28 | Stop reason: HOSPADM

## 2022-04-26 RX ORDER — PANTOPRAZOLE SODIUM 40 MG/1
40 TABLET, DELAYED RELEASE ORAL
Status: DISCONTINUED | OUTPATIENT
Start: 2022-04-27 | End: 2022-04-28 | Stop reason: HOSPADM

## 2022-04-26 RX ORDER — LACTOBACILLUS RHAMNOSUS GG 10B CELL
1 CAPSULE ORAL 2 TIMES DAILY
Status: DISCONTINUED | OUTPATIENT
Start: 2022-04-26 | End: 2022-04-28 | Stop reason: HOSPADM

## 2022-04-26 RX ORDER — SPIRONOLACTONE 25 MG/1
100 TABLET ORAL EVERY MORNING
Status: DISCONTINUED | OUTPATIENT
Start: 2022-04-27 | End: 2022-04-28 | Stop reason: HOSPADM

## 2022-04-26 RX ADMIN — ATORVASTATIN CALCIUM 20 MG: 20 TABLET, FILM COATED ORAL at 22:52

## 2022-04-26 RX ADMIN — FUROSEMIDE 20 MG: 10 INJECTION, SOLUTION INTRAMUSCULAR; INTRAVENOUS at 21:57

## 2022-04-26 RX ADMIN — SPIRONOLACTONE 50 MG: 25 TABLET, FILM COATED ORAL at 23:25

## 2022-04-26 RX ADMIN — RIFAXIMIN 550 MG: 550 TABLET ORAL at 22:53

## 2022-04-26 RX ADMIN — Medication 1 CAPSULE: at 22:53

## 2022-04-26 RX ADMIN — OXYCODONE HYDROCHLORIDE AND ACETAMINOPHEN 1 TABLET: 5; 325 TABLET ORAL at 23:25

## 2022-04-26 RX ADMIN — FERROUS SULFATE TAB 325 MG (65 MG ELEMENTAL FE) 325 MG: 325 (65 FE) TAB at 22:53

## 2022-04-26 RX ADMIN — CYCLOBENZAPRINE 10 MG: 10 TABLET, FILM COATED ORAL at 22:53

## 2022-04-26 RX ADMIN — SODIUM CHLORIDE, PRESERVATIVE FREE 10 ML: 5 INJECTION INTRAVENOUS at 23:24

## 2022-04-26 ASSESSMENT — ENCOUNTER SYMPTOMS
ABDOMINAL PAIN: 1
BACK PAIN: 0
COUGH: 0
CONSTIPATION: 0
WHEEZING: 0
ABDOMINAL PAIN: 1
CHEST TIGHTNESS: 0
CHOKING: 0
SORE THROAT: 0
NAUSEA: 0
SORE THROAT: 0
SINUS PRESSURE: 0
TROUBLE SWALLOWING: 0
SHORTNESS OF BREATH: 1
VOMITING: 0
SINUS PAIN: 0
BACK PAIN: 0
COUGH: 0
DIARRHEA: 1
COLOR CHANGE: 0
ANAL BLEEDING: 0
DIARRHEA: 0
NAUSEA: 0
RESPIRATORY NEGATIVE: 1
FACIAL SWELLING: 0
BLOOD IN STOOL: 1
VOICE CHANGE: 0
CONSTIPATION: 0
ALLERGIC/IMMUNOLOGIC NEGATIVE: 1
BLOOD IN STOOL: 0
TROUBLE SWALLOWING: 0
ABDOMINAL DISTENTION: 1
SHORTNESS OF BREATH: 0
ABDOMINAL DISTENTION: 1
RECTAL PAIN: 0
VOMITING: 0

## 2022-04-26 ASSESSMENT — PAIN DESCRIPTION - LOCATION
LOCATION: BACK;ABDOMEN
LOCATION: BACK

## 2022-04-26 ASSESSMENT — PAIN SCALES - GENERAL
PAINLEVEL_OUTOF10: 10

## 2022-04-26 ASSESSMENT — PAIN DESCRIPTION - DESCRIPTORS
DESCRIPTORS: ACHING;BURNING;CRAMPING
DESCRIPTORS: ACHING;DULL;JABBING

## 2022-04-26 ASSESSMENT — PAIN DESCRIPTION - FREQUENCY: FREQUENCY: CONTINUOUS

## 2022-04-26 ASSESSMENT — PAIN DESCRIPTION - ORIENTATION
ORIENTATION: LOWER
ORIENTATION: LOWER

## 2022-04-26 ASSESSMENT — PAIN DESCRIPTION - PAIN TYPE: TYPE: CHRONIC PAIN

## 2022-04-26 NOTE — ED PROVIDER NOTES
[facility] ED  Emergency Department Encounter  Emergency Medicine Resident     Pt Name: Horace Wood  MRN: [id number]  [place] 1960  Date of evaluation: 4/26/22  PCP:  Arnie Hooks Dr       Chief Complaint   Patient presents with    Shortness of Breath       HISTORY OFPRESENT ILLNESS  (Location/Symptom, Timing/Onset, Context/Setting, Quality, Duration, Modifying Factors,Severity.)      Horace Wood is a 57 y. o.yo male who presents with history of colon cancer, esophageal cancer, cirrhosis and ascites. Patient states that he is currently in a nursing facility after a recent ICU stay. He states he supposed be getting rehab there but has not been receiving rehab. He states that he has been having increasing shortness of breath, worsening abdominal distention and black tarry stools. Patient states that he went and saw Dr. Savage Colón are his gastroenterologist today and he sent him here for admission with plans for paracentesis tomorrow. Patient states that he typically gets weekly paracentesis last had one done last Monday. Patient did have a CT chest abdomen pelvis done yesterday for the shortness of breath but did not have a paracentesis performed at that time. Patient denies any fever, chills nausea vomiting or urinary symptoms.     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 ([address] Anxiety, Arthritis, Back pain, chronic, [place] esophagus, BPH (benign prostatic hypertrophy), Cholelithiasis, Cirrhosis ([address] COVID-19, COVID-19 vaccine series completed, DDD (degenerative disc disease), lumbar, Depression, Esophageal cancer ([address] Esophageal varices ([address] Fatty liver, GERD (gastroesophageal reflux disease), Hep C w/o coma, chronic ([address] History of alcohol abuse, History of blood transfusion, History of colon polyps, History of tobacco abuse, [place] (hard of hearing), Hyperlipidemia, Hypertension, Port-A-Cath in place, Sciatica, Spinal stenosis, Stomach ulcer, Tubular adenoma of colon, Vitamin D deficiency, and Wears glasses. has a past surgical history that includes Bunionectomy; Nasal septum surgery; other surgical history (16); Colonoscopy; Colonoscopy (10/05/2016); other surgical history (2016); other surgical history (2016); knee surgery (Left); Bunionectomy (Left); Endoscopy, colon, diagnostic; pr revise median n/carpal tunnel surg (Right, 2017); Carpal tunnel release (Right); pr revise median n/carpal tunnel surg (Left, 10/31/2017); Colonoscopy (N/A, 3/30/2018); Colonoscopy (2018); pr njx aa&/strd tfrml epi lumbar/sacral 1 level (Bilateral, 2018); other surgical history (2018); pr njx aa&/strd tfrml epi lumbar/sacral 1 level (N/A, 2018); Pain management procedure (Left, 2020); Pain management procedure (Left, 2020); Pain management procedure (Bilateral, 2020); other surgical history (Right, 2020); Nerve Block (Right, 2020); Pain management procedure (Bilateral, 2020); Upper gastrointestinal endoscopy (N/A, 2020); Upper gastrointestinal endoscopy (N/A, 2021); Upper gastrointestinal endoscopy (N/A, 2021); Upper gastrointestinal endoscopy (2021); [place] tooth extraction; IR PORT PLACEMENT > 5 YEARS (2021); Upper gastrointestinal endoscopy (N/A, 2021); Upper gastrointestinal endoscopy (N/A, 2022); Upper gastrointestinal endoscopy (N/A, 4/15/2022); and Colonoscopy (N/A, 2022).      Social History     Socioeconomic History    Marital status: Single     Spouse name: Not on file    Number of children: Not on file    Years of education: Not on file    Highest education level: Not on file   Occupational History    Not on file   Tobacco Use    Smoking status: Former Smoker     Packs/day: 1.00     Years: 45.00     Pack years: 45.00     Quit date: 2017     Years since quittin.2    Smokeless tobacco: Never Used   Vaping Use    Vaping Use: Never used   Substance and Sexual Activity    Alcohol use: Not Currently     Comment: quit 2019    Drug use: Not Currently     Frequency: 1.0 times per week     Comment: cocaine,  stopped spring 2016    Sexual activity: Yes     Partners: Female   Other Topics Concern    Not on file   Social History Narrative     in the past, retired     Social Determinants of Health     Financial Resource Strain:     Difficulty of Paying Living Expenses: Not on file   Food Insecurity:     Worried About [address] in the Last Year: Not on file    [address] [facility] [employer] in the Last Year: Not on file   Transportation Needs:     Lack of Transportation (Medical): Not on file    Lack of Transportation (Non-Medical):  Not on file   Physical Activity:     Days of Exercise per Week: Not on file    Minutes of Exercise per Session: Not on file   Stress:     Feeling of Stress : Not on file   Social Connections:     Frequency of Communication with Friends and Family: Not on file    Frequency of Social Gatherings with Friends and Family: Not on file    Attends Hindu Services: Not on file    Active Member of [address] [place] [address] or Organizations: Not on file    Attends Club or Organization Meetings: Not on file    Marital Status: Not on file   Intimate Partner Violence:     Fear of Current or Ex-Partner: Not on file    Emotionally Abused: Not on file    Physically Abused: Not on file    Sexually Abused: Not on file   Housing Stability:     Unable to Pay for Housing in the Last Year: Not on file    Number of [place] in the Last Year: Not on file    Unstable Housing in the Last Year: Not on file       Family History   Problem Relation Age of Onset    Cancer Mother         pancreatic    Cancer Father         bone    Diabetes Sister     Asthma Brother         Allergies:  Bee pollen and Pollen extract    Home Medications:  Prior to Admission medications Medication Sig Start Date End Date Taking?  Authorizing Provider   FLUoxetine (PROZAC) 20 MG capsule Take 1 capsule by mouth daily 4/21/22   Delores Raphael MD   ondansetron (ZOFRAN-ODT) 4 MG disintegrating tablet Take 1 tablet by mouth every 8 hours as needed for Nausea or Vomiting 4/21/22   Delores Raphael MD   atorvastatin (LIPITOR) 20 MG tablet Take 1 tablet by mouth nightly 4/21/22   Delores Raphael MD   furosemide (LASIX) 40 MG tablet Take 0.5 tablets by mouth 2 times daily 4/21/22   Delores Raphael MD   spironolactone (ALDACTONE) 100 MG tablet Take 1 tablet by mouth every morning 4/21/22   Delores Raphael MD   spironolactone (ALDACTONE) 50 MG tablet Take 1 tablet by mouth every evening 4/21/22   Delores Raphael MD   potassium chloride (KLOR-CON M) 20 MEQ extended release tablet Take 1 tablet by mouth daily 4/21/22   Delores Raphael MD   midodrine (PROAMATINE) 5 MG tablet Take 1 tablet by mouth 3 times daily as needed (SBP <110) 4/21/22   Delores Raphael MD   Probiotic Acidophilus [facility]) TABS Take 1 tablet by mouth 2 times daily 4/21/22 5/21/22  Delores Raphael MD   rifAXIMin [facility]w Lanza) 550 MG tablet Take 1 tablet by mouth 2 times daily 4/21/22   Delores Raphael MD   lactulose [facility]) 10 GM/15ML solution Take 30 mLs by mouth 3 times daily 4/21/22 5/21/22  Delores Raphael MD   cyclobenzaprine (FLEXERIL) 10 MG tablet Take 10 mg by mouth daily as needed for Muscle spasms    Historical Provider, MD   nadolol (CORGARD) 20 MG tablet Take 1 tablet by mouth daily 2/8/22   Jayden Del Valle MD   ferrous sulfate (IRON 325) 325 (65 Fe) MG tablet Take 1 tablet by mouth 2 times daily 1/12/22   VASU Gambino   omeprazole (PRILOSEC) 40 MG delayed release capsule take 1 capsule by mouth EVERY MORNING BEFORE BREAKFAST 11/12/21   Saman Purcell MD   lidocaine-prilocaine (EMLA) 2.5-2.5 % cream Apply topically 45-60 mins prior to needle poke daily PRN 4/22/21   Saman Purcell MD       REVIEW OFSYSTEMS    (2-9 systems for level 4, 10 or more for level 5)      Review of Systems   Constitutional: Negative for chills, diaphoresis, fatigue and fever. HENT: Negative for facial swelling, nosebleeds, sinus pressure, sinus pain, sore throat and trouble swallowing. Respiratory: Positive for shortness of breath. Negative for cough, chest tightness and wheezing. Cardiovascular: Negative for chest pain, palpitations and leg swelling. Gastrointestinal: Positive for abdominal distention, abdominal pain, blood in stool and diarrhea. Negative for constipation, nausea and vomiting. Endocrine: Negative for polydipsia, polyphagia and polyuria. Genitourinary: Negative for dysuria, flank pain and hematuria. Musculoskeletal: Negative for arthralgias, gait problem, neck pain and neck stiffness. Skin: Negative for color change, rash and wound. Neurological: Negative for dizziness, syncope, weakness, light-headedness and headaches. PHYSICAL EXAM   (up to 7 for level 4, 8 or more forlevel 5)      INITIAL VITALS:   ED Triage Vitals   BP Temp Temp Source Pulse Resp SpO2 Height Weight   04/26/22 1619 04/26/22 1621 04/26/22 1621 04/26/22 1619 04/26/22 1619 04/26/22 1619 -- --   127/72 97.9 °F (36.6 °C) Oral 60 16 100 %         Physical Exam  Constitutional:       General: He is not in acute distress. Appearance: He is obese. HENT:      Head: Normocephalic and atraumatic. Right Ear: External ear normal.      Left Ear: External ear normal.      Nose: Nose normal.   Eyes:      General: No scleral icterus. Extraocular Movements: Extraocular movements intact. Conjunctiva/sclera: Conjunctivae normal.      Pupils: Pupils are equal, round, and reactive to light. Cardiovascular:      Rate and Rhythm: Normal rate and regular rhythm. Pulses: Normal pulses. Heart sounds: Normal heart sounds. Pulmonary:      Breath sounds: Normal breath sounds. Comments: Patient appears to get dyspneic after speaking in full sentences.   Vital signs stable  Abdominal: General: Bowel sounds are normal. There is distension. Tenderness: There is no abdominal tenderness. Comments: Large protuberant abdomen, positive fluid wave, minimal tenderness to palpation. Musculoskeletal:         General: Normal range of motion. Cervical back: Normal range of motion. No muscular tenderness. Skin:     General: Skin is warm and dry. Neurological:      General: No focal deficit present. Mental Status: He is alert and oriented to person, place, and time. Cranial Nerves: No cranial nerve deficit. DIFFERENTIAL  DIAGNOSIS     PLAN (LABS / IMAGING / EKG):  Orders Placed This Encounter   Procedures    XR CHEST PORTABLE    CBC with Auto Differential    Basic Metabolic Panel    Magnesium    Hepatic Function Panel    Lipase    Troponin    Brain Natriuretic Peptide    Protime-INR    APTT    Urinalysis    Urinalysis with Microscopic    Occult Blood Screen    Inpatient consult to Internal Medicine    EKG 12 Lead       MEDICATIONS ORDERED:  No orders of the defined types were placed in this encounter. DDX: Ascites, liver failure, pancreatitis, CHF, pulmonary edema, atypical chest pain, ACS, electrolyte abnormality, anemia, GI bleed    Initial MDM/Plan: 57 y.o. male who presents with concern for worsening abdominal distention and shortness of breath. Patient has history of esophageal cancer, ascites and is currently staying in a nursing home after a recent ICU admission. Patient denies any fever, chills is nontoxic-appearing. States he saw his GI doctor today who recommended he come here for admission and plans for paracentesis tomorrow. We will plan for lab work. Patient did have a CT chest abdomen pelvis performed yesterday at this time I do not feel it is necessary to repeat. My suspicion for SBP is low as patient is nontoxic, vital signs are stable.     DIAGNOSTIC RESULTS / EMERGENCYDEPARTMENT COURSE / MDM     LABS:  Labs Reviewed   CBC WITH AUTO DIFFERENTIAL - Abnormal; Notable for the following components:       Result Value    RBC 3.25 (*)     Hemoglobin 9.5 (*)     Hematocrit 29.7 (*)     RDW 25.0 (*)     Seg Neutrophils 76 (*)     Lymphocytes 8 (*)     Monocytes 12 (*)     Absolute Lymph # 0.67 (*)     All other components within normal limits   BASIC METABOLIC PANEL - Abnormal; Notable for the following components:    CREATININE 0.46 (*)     Sodium 133 (*)     All other components within normal limits   HEPATIC FUNCTION PANEL - Abnormal; Notable for the following components:    Albumin 3.1 (*)     Alkaline Phosphatase 261 (*)     ALT 66 (*)     AST 95 (*)     Total Bilirubin 2.84 (*)     Bilirubin, Direct 1.43 (*)     Bilirubin, Indirect 1.41 (*)     Total Protein 5.4 (*)     All other components within normal limits   BRAIN NATRIURETIC PEPTIDE - Abnormal; Notable for the following components:    Pro- (*)     All other components within normal limits   PROTIME-INR - Abnormal; Notable for the following components:    Protime 15.5 (*)     All other components within normal limits   MAGNESIUM   LIPASE   TROPONIN   APTT   TROPONIN   URINALYSIS   URINALYSIS WITH MICROSCOPIC   OCCULT BLOOD SCREEN         RADIOLOGY:  XR CHEST PORTABLE    Result Date: 4/26/2022  EXAMINATION: ONE XRAY VIEW OF THE CHEST 4/26/2022 6:18 pm COMPARISON: 04/15/2022 HISTORY: ORDERING SYSTEM PROVIDED HISTORY: SOB TECHNOLOGIST PROVIDED HISTORY: SOB Reason for Exam: shortness of breath FINDINGS: Right-sided central venous catheter remains in place. The lungs are without acute focal process. There is no effusion or pneumothorax. The cardiomediastinal silhouette is stable. The osseous structures are stable. No acute process.          EKG  EKG Interpretation    Interpreted by me    Rhythm: normal sinus   Rate: normal  Axis: normal  Ectopy: none  Conduction: normal  ST Segments: no acute change  T Waves: no acute change  Q Waves: none    Clinical Impression: no acute changes and normal EKG    All EKG's are interpreted by the Emergency Department Physicianwho either signs or Co-signs this chart in the absence of a cardiologist.    EMERGENCY DEPARTMENT COURSE:  ED Course as of 04/26/22 1912 Tue Apr 26, 2022   1804 Patient seen and evaluated. Was sent in from his GI office today secondary to worsening abdominal distention and shortness of breath. GI doctor recommending admission to PCP plan for paracentesis tomorrow [CD]   1843 No acute processes on chest x-ray. [CD]   1846 Hemoglobin Quant(!): 9.5  Stable [CD]   1900 Alk Phos(!): 261 [CD]   1900 AST and ALT elevated. 95/66 [CD]   1912 Discussed case with Rin Hopkins from internal medicine. Will admit to the hospital plan for paracentesis tomorrow [CD]      ED Course User Index  [CD] Carline Arvizu DO          PROCEDURES:  None    CONSULTS:  IP CONSULT TO INTERNAL MEDICINE    CRITICAL CARE:  Please see attending documentation    FINAL IMPRESSION      1. Shortness of breath    2. Other ascites          DISPOSITION / PLAN     DISPOSITION Decision To Admit 04/26/2022 07:04:58 PM      PATIENT REFERRED TO:  No follow-up provider specified.     DISCHARGE MEDICATIONS:  New Prescriptions    No medications on file       Carline Arvizu DO  Emergency Medicine Resident    (Please note that portions of this note were completed with a voice recognition program.Efforts were made to edit the dictations but occasionally words are mis-transcribed.)        Carline Arvizu DO  Resident  04/26/22 2738

## 2022-04-26 NOTE — ED TRIAGE NOTES
Patient to emergency department with difficulty breathing, abdominal distention and blood in stool. Pt states Dr. Mary Hook sent him to ed for admission. Pt states these symptoms have been ongoing for about 6 months.

## 2022-04-26 NOTE — H&P
[address] [facility]     HISTORY AND PHYSICAL EXAMINATION            Date:   4/27/2022  Patientname:  Abbey Sanchez  Date of admission:  4/26/2022  5:39 PM  MRN:   [id number]  Account:  [de-identified]  YOB: 1960  PCP:    VASU Alexander  Room:   2063/2063-01  Code Status:    Full Code    CHIEF COMPLAINT     Chief Complaint   Patient presents with    Shortness of Breath       HISTORY OF PRESENT ILLNESS  (Character, Onset, Location, Duration,  Exacerbating/RelievingFactors, Radiation,   Associated Symptoms, Severity )      The patient is a 57 y.o.  male, with a history of alcohol abuse - in remission, alcoholic cirrhosis of the liver with ascites, anemia, tobacco use, esophageal adenocarcinoma, HTN, GI bleed, and hepatitis C, who presents with shortness of breath. According to patient, he went for a follow-up appointment with his gastroenterologist today and he was instructed to come to the ED to be admitted for paracentesis in the morning. Patient reports having a paracentesis weekly, with the most recent one being 8 days ago. He was admitted to the hospital from 4/13 through 4/21 and states that he was sent to an ECF on discharge. Reports that he came here yesterday for CT scan but is unsure why he did not have his thoracentesis completed. Patient reports that he is having shortness of breath from the increasing fluid in his abdomen. States that he is not sure if he is receiving the correct medication and is concerned that he has not been receiving a low sodium diet. Symptoms are associated with mild, diffuse abdominal pain. Additionally, patient reports that he has been having dark, tarry, diarrhea stools that started on 4/23. Denies fever, chills, chest pain, cough, nausea, vomiting, and urinary symptoms. There are no aggravating or alleviating factors. Symptoms are reported as constant and moderate to severe; progressively worsening with time.     PAST MEDICAL HISTORY   Patient  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 ([facility] [address] Anxiety, Arthritis, Back pain, chronic, [place] esophagus, BPH (benign prostatic hypertrophy), Cholelithiasis, Cirrhosis ([address] COVID-19, COVID-19 vaccine series completed, DDD (degenerative disc disease), lumbar, Depression, Esophageal cancer ([facility] [address] Esophageal varices ([facility] [address] Fatty liver, GERD (gastroesophageal reflux disease), Hep C w/o coma, chronic ([address] History of alcohol abuse, History of blood transfusion, History of colon polyps, History of tobacco abuse, [place] (hard of hearing), Hyperlipidemia, Hypertension, Port-A-Cath in place, Sciatica, Spinal stenosis, Stomach ulcer, Tubular adenoma of colon, Vitamin D deficiency, and Wears glasses. PAST SURGICAL HISTORY    Patient  has a past surgical history that includes Bunionectomy; Nasal septum surgery; other surgical history (01/04/16); Colonoscopy; Colonoscopy (10/05/2016); other surgical history (11/21/2016); other surgical history (12/19/2016); knee surgery (Left); Bunionectomy (Left); Endoscopy, colon, diagnostic; pr revise median n/carpal tunnel surg (Right, 8/29/2017); Carpal tunnel release (Right); pr revise median n/carpal tunnel surg (Left, 10/31/2017); Colonoscopy (N/A, 3/30/2018); Colonoscopy (03/30/2018); pr njx aa&/strd tfrml epi lumbar/sacral 1 level (Bilateral, 9/6/2018); other surgical history (09/28/2018); pr njx aa&/strd tfrml epi lumbar/sacral 1 level (N/A, 9/28/2018); Pain management procedure (Left, 7/9/2020); Pain management procedure (Left, 7/20/2020); Pain management procedure (Bilateral, 8/17/2020); other surgical history (Right, 11/23/2020); Nerve Block (Right, 11/23/2020); Pain management procedure (Bilateral, 12/7/2020); Upper gastrointestinal endoscopy (N/A, 12/29/2020); Upper gastrointestinal endoscopy (N/A, 2/2/2021); Upper gastrointestinal endoscopy (N/A, 2/12/2021); Upper gastrointestinal endoscopy (2/12/2021);  [place] tooth extraction; IR PORT PLACEMENT > 5 YEARS (4/19/2021); Upper gastrointestinal endoscopy (N/A, 8/31/2021); Upper gastrointestinal endoscopy (N/A, 1/21/2022); Upper gastrointestinal endoscopy (N/A, 4/15/2022); and Colonoscopy (N/A, 4/16/2022). FAMILY HISTORY    Patient family history includes Asthma in his brother; Cancer in his father and mother; Diabetes in his sister. SOCIAL HISTORY    Patient  reports that he quit smoking about 5 years ago. He has a 45.00 pack-year smoking history. He has never used smokeless tobacco. He reports previous alcohol use. He reports previous drug use. Frequency: 1.00 time per week. HOME MEDICATIONS        Prior to Admission medications    Medication Sig Start Date End Date Taking? Authorizing Provider   oxyCODONE-acetaminophen (PERCOCET) 5-325 MG per tablet Take 1 tablet by mouth daily as needed for Pain.    Yes Historical Provider, MD   FLUoxetine (PROZAC) 20 MG capsule Take 1 capsule by mouth daily 4/21/22   Joaquina Pineda MD   ondansetron (ZOFRAN-ODT) 4 MG disintegrating tablet Take 1 tablet by mouth every 8 hours as needed for Nausea or Vomiting 4/21/22   Joaquina Pineda MD   atorvastatin (LIPITOR) 20 MG tablet Take 1 tablet by mouth nightly 4/21/22   Joaquina Pineda MD   furosemide (LASIX) 40 MG tablet Take 0.5 tablets by mouth 2 times daily 4/21/22   Joaquina Pineda MD   spironolactone (ALDACTONE) 100 MG tablet Take 1 tablet by mouth every morning 4/21/22   Joaquina Pineda MD   spironolactone (ALDACTONE) 50 MG tablet Take 1 tablet by mouth every evening 4/21/22   Joaquina Pineda MD   potassium chloride (KLOR-CON M) 20 MEQ extended release tablet Take 1 tablet by mouth daily 4/21/22   Joaquina Pineda MD   midodrine (PROAMATINE) 5 MG tablet Take 1 tablet by mouth 3 times daily as needed (SBP <110) 4/21/22   Joaquina Pineda MD   Probiotic Acidophilus [facility]) TABS Take 1 tablet by mouth 2 times daily 4/21/22 5/21/22  Joaquina Pineda MD   rifAXIMin Abiodun Snell) 550 MG tablet Take 1 tablet by mouth 2 times daily 4/21/22   Joaquina Pineda MD lactulose (CHRONULAC) 10 GM/15ML solution Take 30 mLs by mouth 3 times daily 4/21/22 5/21/22  Ciara Sepulveda MD   cyclobenzaprine (FLEXERIL) 10 MG tablet Take 10 mg by mouth daily as needed for Muscle spasms    Historical Provider, MD   nadolol (CORGARD) 20 MG tablet Take 1 tablet by mouth daily 2/8/22   Sandra Hernández MD   ferrous sulfate (IRON 325) 325 (65 Fe) MG tablet Take 1 tablet by mouth 2 times daily 1/12/22   VASU Kapoor   omeprazole (PRILOSEC) 40 MG delayed release capsule take 1 capsule by mouth EVERY MORNING BEFORE BREAKFAST 11/12/21   Matthew Edwards MD   lidocaine-prilocaine (EMLA) 2.5-2.5 % cream Apply topically 45-60 mins prior to needle poke daily PRN 4/22/21   Matthew Edwards MD       ALLERGIES      Bee pollen and Pollen extract    REVIEW OF SYSTEMS     Review of Systems   Constitutional: Negative for chills, diaphoresis and fever. HENT: Negative for congestion and sore throat. Respiratory: Positive for shortness of breath (d/t significant ascites). Negative for cough and wheezing. Cardiovascular: Negative for chest pain, palpitations and leg swelling. Gastrointestinal: Positive for abdominal distention, abdominal pain (diffuse), blood in stool and diarrhea. Negative for constipation, nausea and vomiting. Genitourinary: Negative for dysuria, frequency and urgency. Musculoskeletal: Negative for back pain and myalgias. Skin: Negative for rash. Neurological: Negative for dizziness, weakness and headaches. Psychiatric/Behavioral: The patient is not nervous/anxious. PHYSICAL EXAM      BP (!) 149/89   Pulse 73   Temp 97.7 °F (36.5 °C) (Oral)   Resp 18   Ht 5' 10\" (1.778 m)   Wt 231 lb 0.7 oz (104.8 kg)   SpO2 100%   BMI 33.15 kg/m²  Body mass index is 33.15 kg/m². Physical Exam  Constitutional:       General: He is not in acute distress. Appearance: He is well-developed. He is not diaphoretic. HENT:      Head: Normocephalic and atraumatic.    Eyes: General: Scleral icterus present. Conjunctiva/sclera: Conjunctivae normal.      Pupils: Pupils are equal, round, and reactive to light. Neck:      Trachea: No tracheal deviation. Cardiovascular:      Rate and Rhythm: Normal rate and regular rhythm. Heart sounds: Normal heart sounds. No murmur heard. No friction rub. No gallop. Pulmonary:      Effort: No respiratory distress. Breath sounds: Normal breath sounds. No wheezing or rales. Comments: Mildly dyspneic with normal conversation  Chest:      Chest wall: No tenderness. Abdominal:      General: Bowel sounds are normal. There is distension. Palpations: Abdomen is soft. Tenderness: There is abdominal tenderness (mild, diffuse). There is no guarding. Musculoskeletal:         General: No tenderness. Normal range of motion. Cervical back: Normal range of motion and neck supple. Right lower leg: Edema present. Left lower leg: Edema present. Lymphadenopathy:      Cervical: No cervical adenopathy. Skin:     General: Skin is warm and dry. Coloration: Skin is not pale. Findings: No erythema or rash. Neurological:      Mental Status: He is alert and oriented to person, place, and time. Motor: No seizure activity. Coordination: Coordination normal.   Psychiatric:         Behavior: Behavior normal.         Thought Content:  Thought content normal.       DIAGNOSTICS      EKG:   EKG 12 Lead [[id number]]    Collected: 04/26/22 1840    Updated: 04/26/22 1841     Ventricular Rate 61 BPM    Atrial Rate 61 BPM    P-R Interval 152 ms    QRS Duration 68 ms    Q-T Interval 438 ms    QTc Calculation (Bazett) 440 ms    P Axis 42 degrees    R Axis 15 degrees    T Axis 1 degrees   Narrative:     Normal sinus rhythm   Normal ECG   When compared with ECG of 18-APR-2022 10:06,   No significant change was found     Labs:  CBC:   Recent Labs     04/26/22  1830 04/27/22  0109   WBC 8.4  --    HGB 9.5* 9.2*   PLT 272  --      BMP:    Recent Labs     04/26/22  1830   *   K 4.6      CO2 21   BUN 9   CREATININE 0.46*   GLUCOSE 88     S. Calcium:  Recent Labs     04/26/22  1830   CALCIUM 8.9     S. Ionized Calcium:No results for input(s): IONCA in the last 72 hours. S. Magnesium:  Recent Labs     04/26/22  1830   MG 1.7     S. Phosphorus:No results for input(s): PHOS in the last 72 hours. S. Glucose:No results for input(s): POCGLU in the last 72 hours. Glycosylated hemoglobin A1C:   Lab Results   Component Value Date    LABA1C 4.2 08/19/2021     Hepatic:   Recent Labs     04/26/22 1830   AST 95*   ALT 66*   ALKPHOS 261*     CARDIAC ENZY:   Recent Labs     04/26/22 1830 04/26/22  2040   TROPHS 13 14     INR:   Recent Labs     04/26/22 1830   INR 1.2     BNP:   Recent Labs     04/26/22 1830   PROBNP 540*      ABGs: No results for input(s): PH, PCO2, PO2, HCO3, O2SAT in the last 72 hours. Lipids: No results for input(s): CHOL, TRIG, HDL, LDL, LDLCALC in the last 72 hours. Pancreatic functions:  Recent Labs     04/26/22 1830   LIPASE 20     S. LacticAcid: No results for input(s): LACTA in the last 72 hours. Thyroid functions:   Lab Results   Component Value Date    TSH 3.35 07/03/2020      U/A:  Recent Labs     04/26/22 2039   COLORU Dark Yellow*   WBCUA 0 TO 2   RBCUA 0 TO 2   BACTERIA None   SPECGRAV 1.017   LEUKOCYTESUR TRACE*   GLUCOSEU NEGATIVE     No results for input(s): COVID19 in the last 72 hours. Imaging/Diagonstics:     US LIVER    Result Date: 4/19/2022  EXAMINATION: RIGHT UPPER QUADRANT ULTRASOUND 4/19/2022 9:24 am COMPARISON: None. HISTORY: ORDERING SYSTEM PROVIDED HISTORY: evaluate portal/hepatic vein, r/o PVT TECHNOLOGIST PROVIDED HISTORY: evaluate portal/hepatic vein, r/o PVT FINDINGS: LIVER:  Nodular margins of the liver would suggest hepatic cirrhosis. No focal lesion. Liver 15.6 cm in length. Hepatopetal flow portal vein. BILIARY SYSTEM:  Gallstones and sludge within the gallbladder. No wall thickening. Negative sonographic Silva's sign. Common bile duct is within normal limits measuring 4.6 mm. OTHER: Moderate ascites visualized right upper quadrant. 1. Hepatic cirrhosis. No focal mass. Hepatopetal flow portal vein. 2. Gallstones and sludge within the gallbladder. 3. Ascites RECOMMENDATIONS: Unavailable     XR CHEST PORTABLE    Result Date: 4/26/2022  EXAMINATION: ONE XRAY VIEW OF THE CHEST 4/26/2022 6:18 pm COMPARISON: 04/15/2022 HISTORY: ORDERING SYSTEM PROVIDED HISTORY: SOB TECHNOLOGIST PROVIDED HISTORY: SOB Reason for Exam: shortness of breath FINDINGS: Right-sided central venous catheter remains in place. The lungs are without acute focal process. There is no effusion or pneumothorax. The cardiomediastinal silhouette is stable. The osseous structures are stable. No acute process. XR CHEST PORTABLE    Result Date: 4/15/2022  EXAMINATION: ONE XRAY VIEW OF THE CHEST 4/15/2022 7:59 am COMPARISON: 02/02/2022, 12/21/2021 HISTORY: ORDERING SYSTEM PROVIDED HISTORY: Dyspnea TECHNOLOGIST PROVIDED HISTORY: Dyspnea Reason for Exam: Dyspnea FINDINGS: Right chest port terminates in the superior vena cava. Mild pulmonary vascular congestion. No focal pulmonary opacities. Calcified granulomata and calcified mediastinal nodes from prior granulomatous infection. Cardiomegaly which is similar to 4 months prior. No acute bony findings. Mild pulmonary vascular congestion. Cardiomegaly. IR US GUIDED PARACENTESIS    Result Date: 4/18/2022  PROCEDURE: PARACENTESIS WITH IMAGE GUIDANCE US ABDOMEN LIMITED 4/18/2022 HISTORY: ORDERING SYSTEM PROVIDED HISTORY: worsening acites and SOB TECHNOLOGIST PROVIDED HISTORY: worsening acites and SOB Please remove no more than 5 L TECHNIQUE: Informed consent was obtained after a detailed explanation of the procedure including risks, benefits, and alternatives. Universal protocol was followed.   A limited ultrasound of the abdomen was performed and shows large amount of ascites. The right abdomen was prepped and draped in sterile fashion and local anesthesia was achieved with lidocaine. A 5 [place] needle sheath was advanced into ascites using realtime ultrasound guidance and paracentesis was performed. The patient tolerated the procedure well. EBL: None FINDINGS: Limited ultrasound of the abdomen demonstrates ascites. A total of 4.8 L of clear yellow fluid was removed. Successful ultrasound guided paracentesis. IR US GUIDED PARACENTESIS    Result Date: 4/14/2022  PROCEDURE: PARACENTESIS WITH IMAGE GUIDANCE US ABDOMEN LIMITED 4/14/2022 HISTORY: ORDERING SYSTEM PROVIDED HISTORY: ascites remove 5 liters only TECHNOLOGIST PROVIDED HISTORY: ascites remove 5 liters only TECHNIQUE: Informed consent was obtained after a detailed explanation of the procedure including risks, benefits, and alternatives. Universal protocol was followed. A limited ultrasound of the abdomen was performed and shows a moderate to large amount of ascites. The right abdomen was prepped and draped in sterile fashion and local anesthesia was achieved with lidocaine. A 5 Vietnamese needle sheath was advanced into ascites using realtime ultrasound guidance and paracentesis was performed. The patient tolerated the procedure well. EBL: None FINDINGS: Limited ultrasound of the abdomen demonstrates ascites. A total of 5 L of slightly turbid yellow colored fluid was removed. Additional fluid remains on completion. Successful ultrasound-guided paracentesis.      IR US GUIDED PARACENTESIS    Result Date: 4/5/2022  PROCEDURE: IR US GUIDED PARACENTESIS W IMAGING 4/5/2022 HISTORY: ORDERING SYSTEM PROVIDED HISTORY: Ascites due to alcoholic cirrhosis (HCC) TECHNIQUE: Sonography was utilized CONTRAST: None SEDATION: None FLUOROSCOPY DOSE AND TYPE OR TIME AND EXPOSURES: None DESCRIPTION OF PROCEDURE: Informed consent was obtained after a detailed explanation of the procedure including risks, benefits, and alternatives. Universal protocol was observed. Preprocedure ultrasound shows a dominant fluid pocket in the right lowerquadrant. Using ultrasound guidance (image attached to the medical record), the fluid pocket was accessed with a 5 [place] Lorrie Yueh needle with aspiration of inch clear yellow fluid. Vacuum bottle paracentesis was performed and approximately 7.25 L was removed. the patient tolerated the procedure well and left the department in good condition. Dr. Bryson Vazquez was present. Patient received IV albumin replacement. FINDINGS: 7.25 L drained     Successful ultrasound-guided therapeutic paracentesis     CT CHEST ABDOMEN PELVIS W CONTRAST    Result Date: 4/25/2022  EXAMINATION: CT OF THE CHEST, ABDOMEN, AND PELVIS WITH CONTRAST 4/25/2022 9:59 am TECHNIQUE: CT of the chest, abdomen and pelvis was performed with the administration of intravenous contrast. Multiplanar reformatted images are provided for review. Dose modulation, iterative reconstruction, and/or weight based adjustment of the mA/kV was utilized to reduce the radiation dose to as low as reasonably achievable. COMPARISON: 01/31/2022 HISTORY: ORDERING SYSTEM PROVIDED HISTORY: Esophageal adenocarcinoma ([facility] [address] TECHNOLOGIST PROVIDED HISTORY: follow up Reason for Exam: follow up esophageal cancer Additional signs and symptoms: pt sts increasing abdominal pain and shortness of breath. Sts bloating. FINDINGS: Chest: Mediastinum: The cardiac size is normal. Aortic and coronary artery calcifications. No significant mediastinal lymphadenopathy. .  Thyroid gland grossly normal in visualized portions. Patulous esophagus with intraluminal contrast.  Mild thickening of the esophagus most pronounced in the distal 3rd is noted. The appears to be a sliding hiatal hernia as well. Some periesophageal varices are noted similar to prior. Lungs/pleura: Calcified lingular nodule. No follow-up required. No evidence for metastatic nodules.  There is mild centrilobular emphysema. Central airways unremarkable. Soft Tissues/Bones: Right-sided infusion port terminates in SVC. No acute bony abnormalities. No aggressive lytic or blastic lesions. No significant superficial soft tissue lesion. Abdomen/Pelvis: Organs: Nodular shrunken cirrhotic liver in keeping with history of cirrhosis. No focal liver lesion. No splenomegaly. Pancreas, adrenal glands, kidneys show no significant abnormalities. Cholelithiasis noted. GI/Bowel: Small sliding hiatal hernia is suggested. Small bowel loops show no acute process or focal lesions. Unremarkable colon. No evidence for bowel obstruction. Pelvis: Urinary bladder is unremarkable. Peritoneum/Retroperitoneum: Large volume ascites increased from prior. Periesophageal varices as noted above. Normal enhancement of portal and splenic vein. No evidence for thrombosis. 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 There is no significant lymphadenopathy. Bones/Soft Tissues: No acute bony abnormalities. Diffuse degenerative changes in the spine. No aggressive lytic or blastic lesions. No significant superficial soft tissue lesion. 1. No clear evidence for metastatic disease. 2. Mild diffuse thickening of the esophagus most pronounced in the lower portion where there also appears to be a small sliding hiatal hernia. Consistent with history of esophageal cancer. 3. Cirrhotic liver with a few periesophageal varices. 4. Large volume ascites increased from prior. 5. Cholelithiasis unchanged. ASSESSMENT  and  PLAN     Principal Problem:    Ascites  Resolved Problems:    * No resolved hospital problems.  *    Plan:    Alcoholic cirrhosis with ascites  -Sent to ED per GI for paracentesis  -CT completed 4/25-no clear evidence for metastatic disease  --Mild diffuse thickening of esophagus  --Cirrhotic liver with few periesophageal varices  --Large volume ascites-increased from prior  -Consult IR for paracentesis in am  -Continue home dose of Lasix  --One-time dose Lasix IV administered  -Continue home dose spironolactone  -GI consulted   -Ammonia level 14 this a.m.  --continue home dose of Lactulose    Melena  -Patient reports having dark tarry diarrhea x3 days  -HGB 9.5; was 8.6 on 4/21  -Hemoccult stool positive in ED  -Recheck H&H at 0100  -Had EGD and colonoscopy during admission  --No source of bleeding found  -NPO at midnight-until seen by GI  -Hold DVT prophylaxis  -Monitor for signs of bleeding. Consultations:     IP CONSULT TO INTERNAL MEDICINE  IP CONSULT TO GI  IP CONSULT TO SOCIAL WORK      MASSIMO Haley - CNP   4/27/2022  2:25 AM    [facility] Center[facility] [address] [place] [address], [address] [place] [address]. Phone [phone]     Attending Physician Statement  I have discussed the care of Omi Serrano and I have examined the patient myselft and taken ros and hpi , including pertinent history and exam findings,  with the NP.  I have reviewed the key elements of all parts of the encounter with the NP  I agree with the assessment, plan and orders as documented by the NP      Electronically signed by Rose Rees MD

## 2022-04-26 NOTE — PROGRESS NOTES
GI OFFICE FOLLOW UP    INTERVAL HISTORY:   No referring provider defined for this encounter. Chief Complaint   Patient presents with    Follow-up     Patient is here today to f/u on labs       1. Ascites due to alcoholic cirrhosis ([address]    2. Lezama's esophagus with high grade dysplasia    3. Alcoholic cirrhosis of liver without ascites ([address]    4. 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 Patient was sent from the nursing home with a history of abdominal distention, discomfort, shortness of breath. Apparently patient had labs done this morning in the nursing home. We will try to get the lab reports from the nursing home, unfortunately our office was informed by the nursing home that the labs will not be available until tomorrow. Also it is not clear regarding diuretic dose that patient is on at present. Patient also gives history of dark stools over the weekend. He thinks that he has been bleeding over the weekend. He denies cough. But gets short of breath with minimal conversation. This patient was in the hospital recently with a GI bleed and ascites. At that time he had several abdominal paracentesis done. At present is not clear whether patient is on low-salt diet. 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 Past Medical,Family, and Social History reviewed and does contribute to the patient presenting condition. Patient's PMH/PSH,SH,PSYCH Hx, MEDs, ALLERGIES, and ROS were all reviewed and updated in the appropriate sections.  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 Jack Khan, orthopedic, every 3-4 months, gets steroid injection    Lezama esophagus     BPH (benign prostatic hypertrophy)     Cholelithiasis     Cirrhosis ([facility]     COVID-19 12/2020    pt reports he had a positive test while at [facility] in 2020, was asymptomatic    COVID-19 vaccine series completed 5/20/2021, 6/22/2021    Moderna 5/20/2021, 6/22/2021    DDD (degenerative disc disease), lumbar     Depression     Esophageal cancer ([address]     INVASIVE ADENOCARCINOMA ARISING IN TUBULAR ADENOMA WITH HIGH GRADE DYSPLASIA, ASSOCIATED WITH FOCAL INTESTINAL METAPLASIA     Esophageal varices ([address]     Fatty liver     GERD (gastroesophageal reflux disease)     Hep C w/o coma, chronic ([address]     History of alcohol abuse     6-12 beers a day; quit drinking July 2016    History of blood transfusion     History of colon polyps 2016    History of tobacco abuse     [place] (hard of hearing)     Hyperlipidemia     Hypertension     Port-A-Cath in place     right upper chest    Sciatica     Spinal stenosis     Stomach ulcer     hx of    Tubular adenoma of colon 2016, 2018    Vitamin D deficiency     Wears glasses        Past Surgical History:   Procedure Laterality Date    BUNIONECTOMY      twice on right side    BUNIONECTOMY Left     CARPAL TUNNEL RELEASE Right     COLONOSCOPY      at age 35    COLONOSCOPY  10/05/2016    polyps-pathology tubular adenoma, and abnormal looking mucosa right colon-pathology-tubular adenoma    COLONOSCOPY N/A 3/30/2018    COLONOSCOPY POLYPECTOMY COLD BIOPSY performed by Aylin Herndon MD at [facility]  03/30/2018    Small polyp in the sigmoid colon and excised with biopsy forceps--tubular adenoma    COLONOSCOPY N/A 4/16/2022    COLONOSCOPY POLYPECTOMY performed by Aylin Herndon MD at [address] [place] [address], COLON, DIAGNOSTIC      EGD    IR PORT PLACEMENT EQUAL OR GREATER THAN 5 YEARS  4/19/2021    IR PORT PLACEMENT EQUAL OR GREATER THAN 5 YEARS 4/19/2021 [facility]    KNEE SURGERY Left     cyst removed    NASAL SEPTUM SURGERY      NERVE BLOCK Right 11/23/2020    NERVE BLOCK RIGHT CERVICAL STEROID INJECTION  C3-C6 performed by Dylon Campos MD at [place] [address]  01/04/16    steroid injection C7 T1    OTHER SURGICAL HISTORY  11/21/2016    Bilateral Lumbar CACHORRO L4-L5 injections    OTHER SURGICAL HISTORY  12/19/2016    lumbar steroid injection    OTHER SURGICAL HISTORY  09/28/2018    BILATERAL L5 CACHORRO (N/A Back)    OTHER SURGICAL HISTORY Right 11/23/2020    cervical injection    PAIN MANAGEMENT PROCEDURE Left 7/9/2020    EPIDURAL STEROID INJECTION LEFT L4 L5 performed by Dylon Campos MD at [place] [address] Left 7/20/2020    LEFT L4 L5 EPIDURAL STEROID INJECTION performed by Dylon Campos MD at [place] [address] Bilateral 8/17/2020    LUMBAR FACET BILATERAL L2-L5 performed by Dylon Campos MD at [place] [address] Bilateral 12/7/2020    NERVE BLOCK BILATERAL LUMBAR MEDIAL BRANCH L2-L5 performed by Dylon Campos MD at [address] AA&/STRD TFRML EPI LUMBAR/SACRAL 1 LEVEL Bilateral 9/6/2018    BILATERAL L5 CACHORRO performed by Dylon Campos MD at [address] AA&/STRD TFRML EPI LUMBAR/SACRAL 1 LEVEL N/A 9/28/2018    BILATERAL L5 CACHORRO performed by Dylon Campos MD at [address] [place] [address] N/CARPAL TUNNEL SURG Right 8/29/2017    CARPAL TUNNEL RELEASE RIGHT performed by Cally Ruff MD at [address] [place] [address] N/CARPAL TUNNEL SURG Left 10/31/2017    CARPAL TUNNEL RELEASE performed by Cally Ruff MD at [address] [place] [address] 12/29/2020    EGD BIOPSY performed by Neo Mendiola MD at [address] [place] [address] 2/2/2021    EGD BIOPSY and spot marking performed by Prema Velasquez MD at [address] [place] [address] N/A 2/12/2021    ENDOSCOPIC ULTRASOUND, EGD performed by Nicola Goel MD at [facility]  2/12/2021    EGD DIAGNOSTIC ONLY performed by Nicola Goel MD at [facility]    UPPER GASTROINTESTINAL ENDOSCOPY N/A 8/31/2021    EGD BIOPSY performed by Mana Adame MD at [address] [place] [address] N/A 1/21/2022    EGD BIOPSY performed by Mana Adame MD at [address] [place] [address] N/A 4/15/2022    EGD ESOPHAGOGASTRODUODENOSCOPY performed by Maya Hoffmann MD at [address] [place] [address] [place]:    Current Outpatient Medications:     FLUoxetine (PROZAC) 20 MG capsule, Take 1 capsule by mouth daily, Disp: 30 capsule, Rfl: 3    ondansetron (ZOFRAN-ODT) 4 MG disintegrating tablet, Take 1 tablet by mouth every 8 hours as needed for Nausea or Vomiting, Disp: 10 tablet, Rfl: 0    atorvastatin (LIPITOR) 20 MG tablet, Take 1 tablet by mouth nightly, Disp: 30 tablet, Rfl: 3    furosemide (LASIX) 40 MG tablet, Take 0.5 tablets by mouth 2 times daily, Disp: 60 tablet, Rfl: 3    spironolactone (ALDACTONE) 100 MG tablet, Take 1 tablet by mouth every morning, Disp: 30 tablet, Rfl: 3    spironolactone (ALDACTONE) 50 MG tablet, Take 1 tablet by mouth every evening, Disp: 30 tablet, Rfl: 3    potassium chloride (KLOR-CON M) 20 MEQ extended release tablet, Take 1 tablet by mouth daily, Disp: 60 tablet, Rfl: 0    midodrine (PROAMATINE) 5 MG tablet, Take 1 tablet by mouth 3 times daily as needed (SBP <110), Disp: 90 tablet, Rfl: 3    Probiotic Acidophilus (FLORANEX) TABS, Take 1 tablet by mouth 2 times daily, Disp: 60 tablet, Rfl: 0    rifAXIMin (XIFAXAN) 550 MG tablet, Take 1 tablet by mouth 2 times daily, Disp: 42 tablet, Rfl: 1    lactulose (CHRONULAC) 10 GM/15ML solution, Take 30 mLs by mouth 3 times daily, Disp: 473 mL, Rfl: 1    cyclobenzaprine (FLEXERIL) 10 MG tablet, Take 10 mg by mouth daily as needed for Muscle spasms, Disp: , Rfl:     nadolol (CORGARD) 20 MG tablet, Take 1 tablet by mouth daily, Disp: 30 tablet, Rfl: 3    ferrous sulfate (IRON 325) 325 (65 Fe) MG tablet, Take 1 tablet by mouth 2 times daily, Disp: 60 tablet, Rfl: 5    omeprazole (PRILOSEC) 40 MG delayed release capsule, take 1 capsule by mouth EVERY MORNING BEFORE BREAKFAST, Disp: 30 capsule, Rfl: 2    lidocaine-prilocaine (EMLA) 2.5-2.5 % cream, Apply topically 45-60 mins prior to needle poke daily PRN, Disp: 1 Tube, Rfl: 2    ALLERGIES:   Allergies   Allergen Reactions    Bee Pollen Other (See Comments)     Runny nose, watery eyes    Pollen Extract      Runny nose, watery eyes       FAMILY HISTORY:       Problem Relation Age of Onset    Cancer Mother         pancreatic    Cancer Father         bone    Diabetes Sister     Asthma Brother          SOCIAL HISTORY:   Social History     Socioeconomic History    Marital status: Single     Spouse name: Not on file    Number of children: Not on file    Years of education: Not on file    Highest education level: Not on file   Occupational History    Not on file   Tobacco Use    Smoking status: Former Smoker     Packs/day: 1.00     Years: 45.00     Pack years: 45.00     Quit date: 2017     Years since quittin.2    Smokeless tobacco: Never Used   Vaping Use    Vaping Use: Never used   Substance and Sexual Activity    Alcohol use: Not Currently     Comment: quit     Drug use: Not Currently     Frequency: 1.0 times per week     Comment: cocaine,  stopped spring 2016    Sexual activity: Yes     Partners: Female   Other Topics Concern    Not on file   Social History Narrative     in the past, retired     Social Determinants of Health     Financial Resource Strain:     Difficulty of Paying Living Expenses: Not on file   Food Insecurity:     Worried About Running Out of Food in the Last Year: Not on file    Marge of Food in the Last Year: Not on JAMIA Villatoro Needs:     Lack of Transportation (Medical): Not on file    Lack of Transportation (Non-Medical): Not on file   Physical Activity:     Days of Exercise per Week: Not on file    Minutes of Exercise per Session: Not on file   Stress:     Feeling of Stress : Not on file   Social Connections:     Frequency of Communication with Friends and Family: Not on file    Frequency of Social Gatherings with Friends and Family: Not on file    Attends [employer] Services: Not on file    Active Member of [address] [place] [address] or Organizations: Not on file    Attends Club or Organization Meetings: Not on file    Marital Status: Not on file   Intimate Partner Violence:     Fear of Current or Ex-Partner: Not on file    Emotionally Abused: Not on file    Physically Abused: Not on file    Sexually Abused: Not on file   Housing Stability:     Unable to Pay for Housing in the Last Year: Not on file    Number of [place] in the Last Year: Not on file    Unstable Housing in the Last Year: Not on file         REVIEW OF SYSTEMS:         Review of Systems   Constitutional: Positive for fatigue. Negative for appetite change and unexpected weight change. HENT: Positive for hearing loss (rt ear). Negative for sore throat, trouble swallowing and voice change. Eyes: Positive for visual disturbance (wears glasses). Respiratory: Negative. Negative for cough, choking and shortness of breath. Cardiovascular: Negative. Negative for chest pain and leg swelling. Gastrointestinal: Positive for abdominal distention and abdominal pain. Negative for anal bleeding, blood in stool, constipation, diarrhea, nausea, rectal pain and vomiting. Denies   Endocrine: Negative. Negative for polydipsia, polyphagia and polyuria. Genitourinary: Negative for difficulty urinating, frequency, hematuria and urgency. Musculoskeletal: Positive for gait problem (in wheel chair). Negative for back pain, joint swelling and myalgias. Skin: Negative. Allergic/Immunologic: Negative. Negative for environmental allergies, food allergies and immunocompromised state. Neurological: Negative for dizziness, tremors, weakness, light-headedness, numbness and headaches. Hematological: Bruises/bleeds easily. Psychiatric/Behavioral: Positive for sleep disturbance. The patient is nervous/anxious. PHYSICAL EXAMINATION:   Patient is awake. He is alert. Abdominal examination revealed that his abdomen needs markedly distended. Bowel sounds present. No acute tenderness. Lungs expands fairly. No wheezing. Has a Rales and rhonchi. Cardiac examination revealed distant heart sounds. Extremities has edema. Conjunctiva looks pale. Vital signs reviewed per the nursing documentation. /73   Pulse 60   Ht 5' 10\" (1.778 m)   Wt 234 lb 8 oz (106.4 kg)   SpO2 100%   BMI 33.65 kg/m²   Body mass index is 33.65 kg/m².    Physical Exam      LABORATORY DATA: Reviewed  Lab Results   Component Value Date    WBC 8.4 04/26/2022    HGB 9.5 (L) 04/26/2022    HCT 29.7 (L) 04/26/2022    MCV 91.2 04/26/2022     04/26/2022     (L) 04/26/2022    K 4.6 04/26/2022     04/26/2022    CO2 21 04/26/2022    BUN 9 04/26/2022    CREATININE 0.46 (L) 04/26/2022    LABPROT 7.7 04/19/2012    LABALBU 3.1 (L) 04/26/2022    BILITOT 2.84 (H) 04/26/2022    ALKPHOS 261 (H) 04/26/2022    AST 95 (H) 04/26/2022    ALT 66 (H) 04/26/2022    INR 1.2 04/26/2022         Lab Results   Component Value Date    RBC 3.25 (L) 04/26/2022    HGB 9.5 (L) 04/26/2022    MCV 91.2 04/26/2022    MCH 29.2 04/26/2022    MCHC 32.0 04/26/2022    RDW 25.0 (H) 04/26/2022    MPV 7.1 04/26/2022    BASOPCT 1 04/26/2022    LYMPHSABS 0.67 (L) 04/26/2022    MONOSABS 1.01 04/26/2022    NEUTROABS 6.39 04/26/2022    EOSABS 0.25 04/26/2022    BASOSABS 0.08 04/26/2022         DIAGNOSTIC TESTING:     US LIVER    Result Date: 4/19/2022  EXAMINATION: RIGHT UPPER QUADRANT ULTRASOUND 4/19/2022 9:24 am COMPARISON: None. HISTORY: ORDERING SYSTEM PROVIDED HISTORY: evaluate portal/hepatic vein, r/o PVT TECHNOLOGIST PROVIDED HISTORY: evaluate portal/hepatic vein, r/o PVT FINDINGS: LIVER:  Nodular margins of the liver would suggest hepatic cirrhosis. No focal lesion. Liver 15.6 cm in length. Hepatopetal flow portal vein. BILIARY SYSTEM:  Gallstones and sludge within the gallbladder. No wall thickening. Negative sonographic Silva's sign. Common bile duct is within normal limits measuring 4.6 mm. OTHER: Moderate ascites visualized right upper quadrant. 1. Hepatic cirrhosis. No focal mass. Hepatopetal flow portal vein. 2. Gallstones and sludge within the gallbladder. 3. Ascites RECOMMENDATIONS: Unavailable     XR CHEST PORTABLE    Result Date: 4/15/2022  EXAMINATION: ONE XRAY VIEW OF THE CHEST 4/15/2022 7:59 am COMPARISON: 02/02/2022, 12/21/2021 HISTORY: ORDERING SYSTEM PROVIDED HISTORY: Dyspnea TECHNOLOGIST PROVIDED HISTORY: Dyspnea Reason for Exam: Dyspnea FINDINGS: Right chest port terminates in the superior vena cava. Mild pulmonary vascular congestion. No focal pulmonary opacities. Calcified granulomata and calcified mediastinal nodes from prior granulomatous infection. Cardiomegaly which is similar to 4 months prior. No acute bony findings. Mild pulmonary vascular congestion. Cardiomegaly. IR US GUIDED PARACENTESIS    Result Date: 4/18/2022  PROCEDURE: PARACENTESIS WITH IMAGE GUIDANCE US ABDOMEN LIMITED 4/18/2022 HISTORY: ORDERING SYSTEM PROVIDED HISTORY: worsening acites and SOB TECHNOLOGIST PROVIDED HISTORY: worsening acites and SOB Please remove no more than 5 L TECHNIQUE: Informed consent was obtained after a detailed explanation of the procedure including risks, benefits, and alternatives. Universal protocol was followed. A limited ultrasound of the abdomen was performed and shows large amount of ascites.   The right abdomen was prepped and draped in sterile fashion and local anesthesia in the right lowerquadrant. Using ultrasound guidance (image attached to the medical record), the fluid pocket was accessed with a 5 [place] Lorrie Yueh needle with aspiration of inch clear yellow fluid. Vacuum bottle paracentesis was performed and approximately 7.25 L was removed. the patient tolerated the procedure well and left the department in good condition. Dr. Makayla Guillaume was present. Patient received IV albumin replacement. FINDINGS: 7.25 L drained     Successful ultrasound-guided therapeutic paracentesis     CT CHEST ABDOMEN PELVIS W CONTRAST    Result Date: 4/25/2022  EXAMINATION: CT OF THE CHEST, ABDOMEN, AND PELVIS WITH CONTRAST 4/25/2022 9:59 am TECHNIQUE: CT of the chest, abdomen and pelvis was performed with the administration of intravenous contrast. Multiplanar reformatted images are provided for review. Dose modulation, iterative reconstruction, and/or weight based adjustment of the mA/kV was utilized to reduce the radiation dose to as low as reasonably achievable. COMPARISON: 01/31/2022 HISTORY: ORDERING SYSTEM PROVIDED HISTORY: Esophageal adenocarcinoma ([facility] [address] [address] TECHNOLOGIST PROVIDED HISTORY: follow up Reason for Exam: follow up esophageal cancer Additional signs and symptoms: pt sts increasing abdominal pain and shortness of breath. Sts bloating. FINDINGS: Chest: Mediastinum: The cardiac size is normal. Aortic and coronary artery calcifications. No significant mediastinal lymphadenopathy. .  Thyroid gland grossly normal in visualized portions. Patulous esophagus with intraluminal contrast.  Mild thickening of the esophagus most pronounced in the distal 3rd is noted. The appears to be a sliding hiatal hernia as well. Some periesophageal varices are noted similar to prior. Lungs/pleura: Calcified lingular nodule. No follow-up required. No evidence for metastatic nodules. There is mild centrilobular emphysema. Central airways unremarkable. Soft Tissues/Bones: Right-sided infusion port terminates in SVC. aggressive diuresis. However, patient states that he has significant abdominal discomfort and shortness of breath secondary to abdominal distention/ascites. He wants to have abdominal paracentesis done as early as possible. I will send him to the emergency department for having labs, following that to arrange paracentesis. I did discuss with emergency room physician regarding this patient. Thank you for allowing me to participate in the care of Mr. Chetan Joya. For any further questions please do not hesitate to contact me. I have reviewed and agree with the ROS entered by the MA/LPN. Note is dictated utilizing voice recognition software. Unfortunately this leads to occasional typographical errors.  Please contact our office if you have any questions        Jayson Gaona MD,FACP, [facility]  Board Certified in Gastroenterology and [address] [place] [address] Gastroenterology  Office #: [phone]

## 2022-04-26 NOTE — ED PROVIDER NOTES
[facility] [address] [facility] ED     Emergency Department     Faculty Attestation        I performed a history and physical examination of the patient and discussed management with the resident. I reviewed the residents note and agree with the documented findings and plan of care. Any areas of disagreement are noted on the chart. I was personally present for the key portions of any procedures. I have documented in the chart those procedures where I was not present during the key portions. I have reviewed the emergency nurses triage note. I agree with the chief complaint, past medical history, past surgical history, allergies, medications, social and family history as documented unless otherwise noted below. Documentation of the HPI, Physical Exam and Medical Decision Making performed by medical students or scribes is based on my personal performance of the HPI, PE and MDM. For Physician Assistant/ Nurse Practitioner cases/documentation I have have had a face to face evaluation with this patient and have completed at least one if not all key elements of the E/M (history, physical exam, and MDM). Additional findings are as noted. Pertinent Comments     Abdominal pain, distention, last had a paracentesis a week ago. Sent in for labs, admission and paracentesis tomorrow by his GI physician. On exam abdomen is distended however soft. Minimal tenderness throughout. Will get labs, admit. The care is provided during an unprecedented national emergency due to the novel coronavirus, COVID-19.     (Please note that portions of this note were completed with a voice recognition program.  Efforts were made to edit the dictations but occasionally words are mis-transcribed.)    Steven Saenz DO  Attending Emergency Physician         Steven Saenz DO  04/26/22 [id number]

## 2022-04-26 NOTE — DISCHARGE SUMMARY
[address] [facility]    Discharge Summary     Patient ID: Ember Juarez  :  [id number]   MRN: [id number]     ACCOUNT:  [de-identified]   Patient's PCP: VASU Meyer  Admit Date: 2022   Discharge Date: 2022     Length of Stay: 8  Code Status:  Prior  Admitting Physician: Eugenie Mcmillan MD  Discharge Physician: Nadege Teague MD     Active Discharge Diagnoses:       Primary Problem  Gastrointestinal hemorrhage with [address] Problems    Diagnosis Date Noted    Anemia [D64.9] 2022    Acute kidney injury ([facility] [address] [N17.9] 2022    Esophageal adenocarcinoma ([facility] [address] [C15.9]     Former smoker, 50+ pack years, quit  [Z87.891] 2021    Spinal stenosis of lumbar region with neurogenic claudication [M48.062] 2021    Malignant neoplasm of lower third of esophagus ([address] [C15.5]     Gastrointestinal hemorrhage with melena [K92.1]     Hepatitis C virus infection resolved after antiviral drug therapy [Z86.19] 2020    Hepatic cirrhosis ([address] [K74.60] 09/15/2016    GERD (gastroesophageal reflux disease) [K21.9] 2012       Admission Condition:  poor     Discharged Condition: poor    Hospital Stay:       Hospital Course:  Ember Juarez is a 57 y.o. male who was admitted for the management of   Gastrointestinal hemorrhage with melena , presented to ER with Shortness of Breath    PMH of alcoholic liver disease/cirrhosis, history of hepatitis C (treated).  He is a former smoker with 50+ pack year history, and admits to former heavy alcohol use.  Denies illicit or IV drug use.  Patient also has a history of GE junction adenocarcinoma s/p chemoradiation completed 21 and PET 21 with no recurrence, as well as most recent EGD on 22 which was negative for malignanacy.  This EGD also showed severe portal hypertensive gastropathy with some oozing of blood visualized.     He presented to the emergency department today from pain management clinic where he was scheduled to have an epidural steroid injection for back pain due to spinal stenosis.  However, he was unable to lie prone due to abdominal distention and shortness of breath and he was sent to the emergency department.      According to the patient, he was scheduled for therapeutic paracentesis today at [facility] this afternoon.  His most recent paracentesis was 4/5/2022 with 7.5 L removed.  Per patient, he states that it accumulated quickly afterwards and came back Alex Edgar. \"  This was his second time having paracentesis done; previous paracentesis done in 2016.  He states that he has not been taking his spironolactone for the past few days, as he felt that it was making his blood pressure too low.  He reports that he took all other home medications, though.     Currently, patient states he has some shortness of breath which she attributes to abdominal distention, as well as nausea. However, denies  fever, chills, chest pain, abdominal pain, hematuria, or dark/bloody stool.  Last bowel movement was yesterday     In the emergency department, hemoglobin was found to be 3.6, recheck 3.5. Sodium 120.  Creatinine 1.42, baseline within normal limits.  Total bili 2.4.  INR 1.5.       He was given a dose of Protonix and 1 unit PRBCs in the ED.     He is being admitted to the hospital for the management of severe anemia and sepsis. The next day pt received 3 units PRBCs, venofer added per heme/onc     Patient was feeling extremely uncomfortable and had an IR guided paracentesis 5L removed, FOBT positive, bowel movement revealed bright red blood per rectum; hgb 6.9 despite receiving 6 units of blood and FFP, emergent EGD overnight following additional episodes of hematochezia, no annamaria bleeding noted. Patient later got a colonoscopy which did not find any source of bleeding. Hemoglobin stabilized and platelets normalized.   Diet was advanced.     For ultrasound showed patent portal vein. Spironolactone, lasix, and nadolol restarted, paracentesis done again, 4.8L removed     Results of colon biopsies consistent with tubular adenoma and adenoma (low grade dysplasia) with acute inflammation, platelets decreasing    Patient was in stable condition. Advised to follow-up with GI as an outpatient and pursuing weekly paracentesis. Significant therapeutic interventions: Multiple PRBC transfusions, paracentesis, colonoscopy revealing a tubular adenoma. Significant Diagnostic Studies:   Labs / Micro:  CBC:   Lab Results   Component Value Date    WBC 6.7 04/21/2022    RBC 3.04 04/21/2022    RBC 4.75 04/19/2012    HGB 8.6 04/21/2022    HCT 27.0 04/21/2022    MCV 88.8 04/21/2022    MCH 28.4 04/21/2022    MCHC 32.0 04/21/2022    RDW 23.9 04/21/2022    PLT 98 04/21/2022     04/19/2012     CMP:    Lab Results   Component Value Date    GLUCOSE 100 04/21/2022    GLUCOSE 65 04/19/2012     04/21/2022    K 3.5 04/21/2022     04/21/2022    CO2 23 04/21/2022    BUN 8 04/21/2022    CREATININE <0.40 04/21/2022    ANIONGAP 9 04/21/2022    ALKPHOS 90 04/15/2022    ALT 17 04/15/2022    AST 34 04/15/2022    BILITOT 6.08 04/15/2022    LABALBU 2.9 04/15/2022    LABALBU 4.7 04/19/2012    ALBUMIN NOT REPORTED 02/02/2022    LABGLOM Can not be calculated 04/21/2022    GFRAA Can not be calculated 04/21/2022    GFR      04/21/2022    PROT 4.5 04/15/2022    CALCIUM 7.5 04/21/2022          Radiology:    US LIVER    Result Date: 4/19/2022  EXAMINATION: RIGHT UPPER QUADRANT ULTRASOUND 4/19/2022 9:24 am COMPARISON: None. HISTORY: ORDERING SYSTEM PROVIDED HISTORY: evaluate portal/hepatic vein, r/o PVT TECHNOLOGIST PROVIDED HISTORY: evaluate portal/hepatic vein, r/o PVT FINDINGS: LIVER:  Nodular margins of the liver would suggest hepatic cirrhosis. No focal lesion. Liver 15.6 cm in length. Hepatopetal flow portal vein.  BILIARY SYSTEM:  Gallstones and sludge within the gallbladder. No wall thickening. Negative sonographic Silva's sign. Common bile duct is within normal limits measuring 4.6 mm. OTHER: Moderate ascites visualized right upper quadrant. 1. Hepatic cirrhosis. No focal mass. Hepatopetal flow portal vein. 2. Gallstones and sludge within the gallbladder. 3. Ascites RECOMMENDATIONS: Unavailable     XR CHEST PORTABLE    Result Date: 4/15/2022  EXAMINATION: ONE XRAY VIEW OF THE CHEST 4/15/2022 7:59 am COMPARISON: 02/02/2022, 12/21/2021 HISTORY: ORDERING SYSTEM PROVIDED HISTORY: Dyspnea TECHNOLOGIST PROVIDED HISTORY: Dyspnea Reason for Exam: Dyspnea FINDINGS: Right chest port terminates in the superior vena cava. Mild pulmonary vascular congestion. No focal pulmonary opacities. Calcified granulomata and calcified mediastinal nodes from prior granulomatous infection. Cardiomegaly which is similar to 4 months prior. No acute bony findings. Mild pulmonary vascular congestion. Cardiomegaly. IR US GUIDED PARACENTESIS    Result Date: 4/18/2022  PROCEDURE: PARACENTESIS WITH IMAGE GUIDANCE US ABDOMEN LIMITED 4/18/2022 HISTORY: ORDERING SYSTEM PROVIDED HISTORY: worsening acites and SOB TECHNOLOGIST PROVIDED HISTORY: worsening acites and SOB Please remove no more than 5 L TECHNIQUE: Informed consent was obtained after a detailed explanation of the procedure including risks, benefits, and alternatives. Universal protocol was followed. A limited ultrasound of the abdomen was performed and shows large amount of ascites. The right abdomen was prepped and draped in sterile fashion and local anesthesia was achieved with lidocaine. A 5 Thai needle sheath was advanced into ascites using realtime ultrasound guidance and paracentesis was performed. The patient tolerated the procedure well. EBL: None FINDINGS: Limited ultrasound of the abdomen demonstrates ascites. A total of 4.8 L of clear yellow fluid was removed.      Successful ultrasound guided paracentesis. IR US GUIDED PARACENTESIS    Result Date: 4/14/2022  PROCEDURE: PARACENTESIS WITH IMAGE GUIDANCE US ABDOMEN LIMITED 4/14/2022 HISTORY: ORDERING SYSTEM PROVIDED HISTORY: ascites remove 5 liters only TECHNOLOGIST PROVIDED HISTORY: ascites remove 5 liters only TECHNIQUE: Informed consent was obtained after a detailed explanation of the procedure including risks, benefits, and alternatives. Universal protocol was followed. A limited ultrasound of the abdomen was performed and shows a moderate to large amount of ascites. The right abdomen was prepped and draped in sterile fashion and local anesthesia was achieved with lidocaine. A 5 Yemeni needle sheath was advanced into ascites using realtime ultrasound guidance and paracentesis was performed. The patient tolerated the procedure well. EBL: None FINDINGS: Limited ultrasound of the abdomen demonstrates ascites. A total of 5 L of slightly turbid yellow colored fluid was removed. Additional fluid remains on completion. Successful ultrasound-guided paracentesis. IR US GUIDED PARACENTESIS    Result Date: 4/5/2022  PROCEDURE: IR US GUIDED PARACENTESIS W IMAGING 4/5/2022 HISTORY: ORDERING SYSTEM PROVIDED HISTORY: Ascites due to alcoholic cirrhosis (HCC) TECHNIQUE: Sonography was utilized CONTRAST: None SEDATION: None FLUOROSCOPY DOSE AND TYPE OR TIME AND EXPOSURES: None DESCRIPTION OF PROCEDURE: Informed consent was obtained after a detailed explanation of the procedure including risks, benefits, and alternatives. Universal protocol was observed. Preprocedure ultrasound shows a dominant fluid pocket in the right lowerquadrant. Using ultrasound guidance (image attached to the medical record), the fluid pocket was accessed with a 5 [place] Lorrie Yueh needle with aspiration of inch clear yellow fluid. Vacuum bottle paracentesis was performed and approximately 7.25 L was removed.   the patient tolerated the procedure well and left the department in good condition. Dr. Jojo Wall was present. Patient received IV albumin replacement. FINDINGS: 7.25 L drained     Successful ultrasound-guided therapeutic paracentesis     CT CHEST ABDOMEN PELVIS W CONTRAST    Result Date: 4/25/2022  EXAMINATION: CT OF THE CHEST, ABDOMEN, AND PELVIS WITH CONTRAST 4/25/2022 9:59 am TECHNIQUE: CT of the chest, abdomen and pelvis was performed with the administration of intravenous contrast. Multiplanar reformatted images are provided for review. Dose modulation, iterative reconstruction, and/or weight based adjustment of the mA/kV was utilized to reduce the radiation dose to as low as reasonably achievable. COMPARISON: 01/31/2022 HISTORY: ORDERING SYSTEM PROVIDED HISTORY: Esophageal adenocarcinoma ([facility] [address] TECHNOLOGIST PROVIDED HISTORY: follow up Reason for Exam: follow up esophageal cancer Additional signs and symptoms: pt sts increasing abdominal pain and shortness of breath. Sts bloating. FINDINGS: Chest: Mediastinum: The cardiac size is normal. Aortic and coronary artery calcifications. No significant mediastinal lymphadenopathy. .  Thyroid gland grossly normal in visualized portions. Patulous esophagus with intraluminal contrast.  Mild thickening of the esophagus most pronounced in the distal 3rd is noted. The appears to be a sliding hiatal hernia as well. Some periesophageal varices are noted similar to prior. Lungs/pleura: Calcified lingular nodule. No follow-up required. No evidence for metastatic nodules. There is mild centrilobular emphysema. Central airways unremarkable. Soft Tissues/Bones: Right-sided infusion port terminates in SVC. No acute bony abnormalities. No aggressive lytic or blastic lesions. No significant superficial soft tissue lesion. Abdomen/Pelvis: Organs: Nodular shrunken cirrhotic liver in keeping with history of cirrhosis. No focal liver lesion. No splenomegaly. Pancreas, adrenal glands, kidneys show no significant abnormalities. Cholelithiasis noted. GI/Bowel: Small sliding hiatal hernia is suggested. Small bowel loops show no acute process or focal lesions. Unremarkable colon. No evidence for bowel obstruction. Pelvis: Urinary bladder is unremarkable. Peritoneum/Retroperitoneum: Large volume ascites increased from prior. Periesophageal varices as noted above. Normal enhancement of portal and splenic vein. No evidence for thrombosis. 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 There is no significant lymphadenopathy. Bones/Soft Tissues: No acute bony abnormalities. Diffuse degenerative changes in the spine. No aggressive lytic or blastic lesions. No significant superficial soft tissue lesion. 1. No clear evidence for metastatic disease. 2. Mild diffuse thickening of the esophagus most pronounced in the lower portion where there also appears to be a small sliding hiatal hernia. Consistent with history of esophageal cancer. 3. Cirrhotic liver with a few periesophageal varices. 4. Large volume ascites increased from prior. 5. Cholelithiasis unchanged. Consultations:    Consults:     Final Specialist Recommendations/Findings:   IP CONSULT TO PRIMARY CARE PROVIDER  IP CONSULT TO GI  IP CONSULT TO ONCOLOGY  IP CONSULT TO SOCIAL WORK  IP CONSULT TO CRITICAL CARE  IP CONSULT TO SOCIAL WORK      The patient was seen and examined on day of discharge and this discharge summary is in conjunction with any daily progress note from day of discharge. Discharge plan:       Disposition: To a non-[facility] facility    Physician Follow Up:     Mela Salvador, [address] [place] [address] 36. [phone]             Requiring Further Evaluation/Follow Up POST HOSPITALIZATION/Incidental Findings: Follow-up colonoscopy results outpatient, continue to follow-up with GI for weekly paracentesis    Diet:  Cirrhosis diet    Activity: As tolerated    Instructions to Patient: Take medication as prescribed.   Follow-up with PCP and GI. If symptoms of fever, nausea, chills recur please return to the ED. Discharge Medications:      Medication List      START taking these medications    lactulose 10 GM/15ML solution  Commonly known as: CHRONULAC  Take 30 mLs by mouth 3 times daily     midodrine 5 MG tablet  Commonly known as: PROAMATINE  Take 1 tablet by mouth 3 times daily as needed (SBP <110)     ondansetron 4 MG disintegrating tablet  Commonly known as: ZOFRAN-ODT  Take 1 tablet by mouth every 8 hours as needed for Nausea or Vomiting     potassium chloride 20 MEQ extended release tablet  Commonly known as: KLOR-CON M  Take 1 tablet by mouth daily     Probiotic Acidophilus Tabs  Take 1 tablet by mouth 2 times daily     rifAXIMin 550 MG tablet  Commonly known as: XIFAXAN  Take 1 tablet by mouth 2 times daily        CHANGE how you take these medications    atorvastatin 20 MG tablet  Commonly known as: LIPITOR  Take 1 tablet by mouth nightly  What changed:   · how much to take  · how to take this  · when to take this  · additional instructions     FLUoxetine 20 MG capsule  Commonly known as: PROZAC  Take 1 capsule by mouth daily  What changed: See the new instructions. furosemide 40 MG tablet  Commonly known as: LASIX  Take 0.5 tablets by mouth 2 times daily  What changed:   · medication strength  · See the new instructions. * spironolactone 100 MG tablet  Commonly known as: ALDACTONE  Take 1 tablet by mouth every morning  What changed:   · medication strength  · how much to take  · how to take this  · when to take this  · additional instructions     * spironolactone 50 MG tablet  Commonly known as: ALDACTONE  Take 1 tablet by mouth every evening  What changed: You were already taking a medication with the same name, and this prescription was added. Make sure you understand how and when to take each. * This list has 2 medication(s) that are the same as other medications prescribed for you.  Read the directions carefully, and ask your doctor or other care provider to review them with you. CONTINUE taking these medications    cyclobenzaprine 10 MG tablet  Commonly known as: FLEXERIL     ferrous sulfate 325 (65 Fe) MG tablet  Commonly known as: IRON 325  Take 1 tablet by mouth 2 times daily     lidocaine-prilocaine 2.5-2.5 % cream  Commonly known as: EMLA  Apply topically 45-60 mins prior to needle poke daily PRN     nadolol 20 MG tablet  Commonly known as: Corgard  Take 1 tablet by mouth daily     omeprazole 40 MG delayed release capsule  Commonly known as: PRILOSEC  take 1 capsule by mouth EVERY MORNING BEFORE BREAKFAST        STOP taking these medications    oxyCODONE-acetaminophen 5-325 MG per tablet  Commonly known as: Percocet           Where to Get Your Medications      These medications were sent to [facility], [address]    Phone: [phone]   · atorvastatin 20 MG tablet  · FLUoxetine 20 MG capsule  · furosemide 40 MG tablet  · lactulose 10 GM/15ML solution  · midodrine 5 MG tablet  · ondansetron 4 MG disintegrating tablet  · potassium chloride 20 MEQ extended release tablet  · Probiotic Acidophilus Tabs  · rifAXIMin 550 MG tablet  · spironolactone 100 MG tablet  · spironolactone 50 MG tablet         Electronically signed by   Hilary Rosas MD  4/26/2022  5:23 PM      Thank you Dr. Aliza Vega PA for the opportunity to be involved in this patient's care. Attending Physician Statement  I have discussed the care of Sub[facility] and I have examined the patient myselft and taken ros and hpi , including pertinent history and exam findings,  with the resident. I have reviewed the key elements of all parts of the encounter with the resident. I agree with the DC plan as documented by the resident.       Electronically signed by Aurora Murcia MD

## 2022-04-26 NOTE — PROGRESS NOTES
Medication History completed:    New medications: none    Medications discontinued: none    Changes to dosing: none    Stated allergies: As listed    Other pertinent information: Medications confirmed with [facility] paperwork.      Thank you,   Ailyn Bunch  PharmD Candidate 2022

## 2022-04-27 ENCOUNTER — APPOINTMENT (OUTPATIENT)
Dept: INTERVENTIONAL RADIOLOGY/VASCULAR | Age: 62
DRG: 433 | End: 2022-04-27
Payer: MEDICARE

## 2022-04-27 ENCOUNTER — TELEPHONE (OUTPATIENT)
Dept: GASTROENTEROLOGY | Age: 62
End: 2022-04-27

## 2022-04-27 LAB
ALBUMIN FLUID: 0.7 G/DL
ANION GAP SERPL CALCULATED.3IONS-SCNC: 10 MMOL/L (ref 9–17)
BUN BLDV-MCNC: 10 MG/DL (ref 8–23)
CALCIUM SERPL-MCNC: 8.9 MG/DL (ref 8.6–10.4)
CHLORIDE BLD-SCNC: 102 MMOL/L (ref 98–107)
CO2: 20 MMOL/L (ref 20–31)
CREAT SERPL-MCNC: 0.47 MG/DL (ref 0.7–1.2)
GFR AFRICAN AMERICAN: >60 ML/MIN
GFR NON-AFRICAN AMERICAN: >60 ML/MIN
GFR SERPL CREATININE-BSD FRML MDRD: ABNORMAL ML/MIN/{1.73_M2}
GLUCOSE BLD-MCNC: 84 MG/DL (ref 70–99)
HCT VFR BLD CALC: 28.2 % (ref 41–53)
HCT VFR BLD CALC: 28.4 % (ref 41–53)
HEMOGLOBIN: 9 G/DL (ref 13.5–17.5)
HEMOGLOBIN: 9.2 G/DL (ref 13.5–17.5)
MCH RBC QN AUTO: 29.5 PG (ref 26–34)
MCHC RBC AUTO-ENTMCNC: 32 G/DL (ref 31–37)
MCV RBC AUTO: 92.2 FL (ref 80–100)
PDW BLD-RTO: 25.2 % (ref 11.5–14.9)
PLATELET # BLD: 218 K/UL (ref 150–450)
PMV BLD AUTO: 7.2 FL (ref 6–12)
POTASSIUM SERPL-SCNC: 4.4 MMOL/L (ref 3.7–5.3)
RBC # BLD: 3.06 M/UL (ref 4.5–5.9)
SODIUM BLD-SCNC: 132 MMOL/L (ref 135–144)
SPECIMEN TYPE: NORMAL
SPECIMEN TYPE: NORMAL
TOTAL PROTEIN, BODY FLUID: <1 G/DL
WBC # BLD: 5.5 K/UL (ref 3.5–11)

## 2022-04-27 PROCEDURE — 2709999900 IR US GUIDED PARACENTESIS

## 2022-04-27 PROCEDURE — 85014 HEMATOCRIT: CPT

## 2022-04-27 PROCEDURE — APPNB30 APP NON BILLABLE TIME 0-30 MINS: Performed by: NURSE PRACTITIONER

## 2022-04-27 PROCEDURE — 0W9G3ZZ DRAINAGE OF PERITONEAL CAVITY, PERCUTANEOUS APPROACH: ICD-10-PCS | Performed by: RADIOLOGY

## 2022-04-27 PROCEDURE — G0378 HOSPITAL OBSERVATION PER HR: HCPCS

## 2022-04-27 PROCEDURE — 49083 ABD PARACENTESIS W/IMAGING: CPT

## 2022-04-27 PROCEDURE — 2580000003 HC RX 258: Performed by: NURSE PRACTITIONER

## 2022-04-27 PROCEDURE — 87075 CULTR BACTERIA EXCEPT BLOOD: CPT

## 2022-04-27 PROCEDURE — 85018 HEMOGLOBIN: CPT

## 2022-04-27 PROCEDURE — 80048 BASIC METABOLIC PNL TOTAL CA: CPT

## 2022-04-27 PROCEDURE — 6370000000 HC RX 637 (ALT 250 FOR IP): Performed by: NURSE PRACTITIONER

## 2022-04-27 PROCEDURE — 36415 COLL VENOUS BLD VENIPUNCTURE: CPT

## 2022-04-27 PROCEDURE — P9047 ALBUMIN (HUMAN), 25%, 50ML: HCPCS | Performed by: RADIOLOGY

## 2022-04-27 PROCEDURE — 82042 OTHER SOURCE ALBUMIN QUAN EA: CPT

## 2022-04-27 PROCEDURE — 87205 SMEAR GRAM STAIN: CPT

## 2022-04-27 PROCEDURE — 84157 ASSAY OF PROTEIN OTHER: CPT

## 2022-04-27 PROCEDURE — 99217 PR OBSERVATION CARE DISCHARGE MANAGEMENT: CPT | Performed by: INTERNAL MEDICINE

## 2022-04-27 PROCEDURE — 85027 COMPLETE CBC AUTOMATED: CPT

## 2022-04-27 PROCEDURE — 6360000002 HC RX W HCPCS: Performed by: RADIOLOGY

## 2022-04-27 PROCEDURE — 1200000000 HC SEMI PRIVATE

## 2022-04-27 PROCEDURE — 89051 BODY FLUID CELL COUNT: CPT

## 2022-04-27 PROCEDURE — 87070 CULTURE OTHR SPECIMN AEROBIC: CPT

## 2022-04-27 RX ORDER — ALBUMIN (HUMAN) 12.5 G/50ML
75 SOLUTION INTRAVENOUS ONCE
Status: COMPLETED | OUTPATIENT
Start: 2022-04-27 | End: 2022-04-27

## 2022-04-27 RX ORDER — ACETAMINOPHEN 325 MG/1
650 TABLET ORAL EVERY 4 HOURS PRN
Status: DISCONTINUED | OUTPATIENT
Start: 2022-04-27 | End: 2022-04-28 | Stop reason: HOSPADM

## 2022-04-27 RX ADMIN — RIFAXIMIN 550 MG: 550 TABLET ORAL at 21:30

## 2022-04-27 RX ADMIN — FERROUS SULFATE TAB 325 MG (65 MG ELEMENTAL FE) 325 MG: 325 (65 FE) TAB at 11:02

## 2022-04-27 RX ADMIN — ATORVASTATIN CALCIUM 20 MG: 20 TABLET, FILM COATED ORAL at 21:30

## 2022-04-27 RX ADMIN — SPIRONOLACTONE 50 MG: 25 TABLET, FILM COATED ORAL at 17:31

## 2022-04-27 RX ADMIN — SPIRONOLACTONE 100 MG: 25 TABLET, FILM COATED ORAL at 11:02

## 2022-04-27 RX ADMIN — LACTULOSE 20 G: 20 SOLUTION ORAL at 21:30

## 2022-04-27 RX ADMIN — SODIUM CHLORIDE, PRESERVATIVE FREE 10 ML: 5 INJECTION INTRAVENOUS at 21:34

## 2022-04-27 RX ADMIN — FUROSEMIDE 20 MG: 20 TABLET ORAL at 11:02

## 2022-04-27 RX ADMIN — LACTULOSE 20 G: 20 SOLUTION ORAL at 13:03

## 2022-04-27 RX ADMIN — POTASSIUM CHLORIDE 20 MEQ: 20 TABLET, EXTENDED RELEASE ORAL at 11:02

## 2022-04-27 RX ADMIN — SODIUM CHLORIDE, PRESERVATIVE FREE 10 ML: 5 INJECTION INTRAVENOUS at 13:03

## 2022-04-27 RX ADMIN — NADOLOL 20 MG: 20 TABLET ORAL at 11:08

## 2022-04-27 RX ADMIN — FLUOXETINE HYDROCHLORIDE 20 MG: 20 CAPSULE ORAL at 11:02

## 2022-04-27 RX ADMIN — ALBUMIN (HUMAN) 75 G: 0.25 INJECTION, SOLUTION INTRAVENOUS at 11:01

## 2022-04-27 RX ADMIN — FUROSEMIDE 20 MG: 20 TABLET ORAL at 21:30

## 2022-04-27 RX ADMIN — RIFAXIMIN 550 MG: 550 TABLET ORAL at 11:02

## 2022-04-27 RX ADMIN — Medication 1 CAPSULE: at 21:30

## 2022-04-27 RX ADMIN — Medication 1 CAPSULE: at 11:02

## 2022-04-27 RX ADMIN — FERROUS SULFATE TAB 325 MG (65 MG ELEMENTAL FE) 325 MG: 325 (65 FE) TAB at 21:30

## 2022-04-27 ASSESSMENT — ENCOUNTER SYMPTOMS
DIARRHEA: 1
ABDOMINAL DISTENTION: 1
BLOOD IN STOOL: 1
SHORTNESS OF BREATH: 1

## 2022-04-27 NOTE — PROGRESS NOTES
Nutrition Note    Pt is s/p 4/27 paracentesis with 11 liters removed. Chart reviewed due to nursing screen for vegan diet. During questioning, pt tries to limit his intake of red meat, but denies he is a vegan. He states he can swallow anything with no difficulties. Plan is for discharge later this afternoon.     Electronically signed by Murray Enriquez RD, LD on 4/27/22 at 2:03 PM EDT    Contact: [phone]

## 2022-04-27 NOTE — TELEPHONE ENCOUNTER
Patients wife called and her  was sent to the hospital and admitted due to ascites. 25 pounds was taken off him. Dr Rashmi Luna was concerned about his medication. Wife is as well. I printed med list and hi lighted what medication he is taking for his ascites. She wants to make sure he is taking the correct medication. Will let Radha Palomino know about this.

## 2022-04-27 NOTE — BRIEF OP NOTE
Brief Postoperative Note for Paracentesis    Dino Reyes  YOB: 1960  [id number]    Pre-operative Diagnosis: Ascites      Post-operative Diagnosis: Same    Procedure: Ultrasound guided Paracentesis     Anesthesia: 1% Lidocaine     Surgeons/Assistants: Carin Najjar, MD    Complications: none    Specimens: were not obtained    Ultrasound guided paracentesis performed. 87160 ml clear yellow fluid obtained from RUQ. Dressing applied. Vital signs were reviewed and were stable after the procedure. Discharged to [facility] for Albumin 75 gms IV.       Electronically signed by Carin Najjar, MD on 4/27/2022 at 10:56 AM

## 2022-04-27 NOTE — CARE COORDINATION
CASE MANAGEMENT NOTE:    Admission Date:  4/26/2022 Dino Reyes is a 57 y.o.  male    Admitted for : Shortness of breath [R06.02]  Ascites [R18.8]  Other ascites [R18.8]    Met with:  Patient    PCP:  Anne Marie Archer                                Insurance:  [facility] Medicare      Is patient alert and oriented at time of discussion:  Yes    Current Residence/ Living Arrangements:  Pt. Was coming from [address] [place] [address] [address], Does NOT want to return. Wants to go home. Current Services PTA:  Yes, [facility] having Steroid injections, Next Week for his Back    Does patient go to outpatient dialysis: No  If yes, location and chair time: NA    Is patient agreeable to VNS: Yes    Freedom of choice provided:  Yes    List of [address] provided: No    VNS chosen:  Yes, Niki Kyle following    DME:  straight cane and walker    Home Oxygen: No    Nebulizer: No    CPAP/BIPAP: No    Supplier: N/A    Potential Assistance Needed: Yes, Home Care    SNF needed: No    Freedom of choice and list provided: NA    Pharmacy:  [facility]       Is patient currently receiving oral anticoagulation therapy? No    Is the Patient an [facility] with Readmission Risk Score greater than 14%? No  If yes, pt needs a follow up appointment made within 7 days. Family Members/Caregivers that pt would like involved in their care:    Yes    If yes, list name here:  X- Wife, [address]    Transportation Provider:  Patient             Discharge Plan:  4/27/22 [facility] Pt. Marjorie, Coming from [place], does NOT want to go back, wants to go home. DME- walker, cane. Wants VNS- [facility], following. Getting Steroid injections, for his back, next week in Pain Clinic. Cirrhosis/Ascites. Had 11Liters taken off today in IR. On Xifaxan. Gi following. Tien will need signed/completed.  Will follow//KB              Electronically signed by: Julissa Bender RN on 4/27/2022 at 1:17 PM

## 2022-04-27 NOTE — ED NOTES
Report given to Abdifatah Mendoza from Med Surg. Report method by phone   The following was reviewed with receiving RN:   Current vital signs:  BP (!) 119/94   Pulse 65   Temp 97.9 °F (36.6 °C) (Oral)   Resp 19   SpO2 100%                      Any medication or safety alerts were reviewed. Any pending diagnostics and notifications were also reviewed, as well as any safety concerns or issues, abnormal labs, abnormal imaging, and abnormal assessment findings. Questions were answered.             [facility]  04/26/22 [id number]

## 2022-04-27 NOTE — PROGRESS NOTES
Patient tolerated paracnetesis without distress. 26177 ml of clear yellow fluid removed. Dry dressing to site. Patient returned to room. Nurse updated.

## 2022-04-27 NOTE — PROGRESS NOTES
Pt returned from IR, in report from Neyda Winston was informed that 11 L was removed and new order for 75 mg of Albumin to be administered. New order per Dr. Justus Cerda to resume previous diet.

## 2022-04-27 NOTE — DISCHARGE INSTR - COC
Continuity of Care Form    Patient Name: Richard Goff   :  [id number]  MRN:  [id number]    Admit date:  2022  Discharge date:  22    Code Status Order: Full Code   Advance Directives:      Admitting Physician:  Jose Miguel Aguilar MD  PCP: VASU Lechuga    Discharging Nurse:   [address] Unit/Room#: 2063/2063-01  Discharging Unit Phone Number:     Emergency Contact:   Extended Emergency Contact Information  Primary Emergency Contact: Carlee Chinchilla, [address] Phone: [phone]  Work Phone: [phone]  Mobile Phone: [phone]  Relation: Other    Past Surgical History:  Past Surgical History:   Procedure Laterality Date    BUNIONECTOMY      twice on right side    BUNIONECTOMY Left     CARPAL TUNNEL RELEASE Right     COLONOSCOPY      at age 35    COLONOSCOPY  10/05/2016    polyps-pathology tubular adenoma, and abnormal looking mucosa right colon-pathology-tubular adenoma    COLONOSCOPY N/A 3/30/2018    COLONOSCOPY POLYPECTOMY COLD BIOPSY performed by Hema Jones MD at [address] [place] [address]  2018    Small polyp in the sigmoid colon and excised with biopsy forceps--tubular adenoma    COLONOSCOPY N/A 2022    COLONOSCOPY POLYPECTOMY performed by Hema Jones MD at [facility], [address] [place] [address], DIAGNOSTIC      EGD    IR Marietta Posrclas 15 5 YEARS  2021    IR PORT PLACEMENT EQUAL OR GREATER THAN 5 YEARS 2021 [address] [facility] SPECIAL PROCEDURES    KNEE SURGERY Left     cyst removed    NASAL SEPTUM SURGERY      NERVE BLOCK Right 2020    NERVE BLOCK RIGHT CERVICAL STEROID INJECTION  C3-C6 performed by Rigoberto Alexandre MD at [address] [place] [address]  16    steroid injection C7 T1    OTHER SURGICAL HISTORY  2016    Bilateral Lumbar CACHORRO L4-L5 injections    OTHER SURGICAL HISTORY  2016    lumbar steroid injection    OTHER SURGICAL HISTORY  2018    BILATERAL L5 CACHORRO (N/A Back)    OTHER SURGICAL HISTORY Right 2020 cervical injection    PAIN MANAGEMENT PROCEDURE Left 7/9/2020    EPIDURAL STEROID INJECTION LEFT L4 L5 performed by Melecio Delaney MD at [facility] Left 7/20/2020    LEFT L4 L5 EPIDURAL STEROID INJECTION performed by Melecio Delaney MD at [facility] Bilateral 8/17/2020    LUMBAR FACET BILATERAL L2-L5 performed by Melecio Delaney MD at [facility] Bilateral 12/7/2020    NERVE BLOCK BILATERAL LUMBAR MEDIAL BRANCH L2-L5 performed by Melecio Delaney MD at [facility] AA&/STRD TFRML EPI LUMBAR/SACRAL 1 LEVEL Bilateral 9/6/2018    BILATERAL L5 CACHORRO performed by Melecio Delaney MD at [facility] AA&/STRD TFRML EPI LUMBAR/SACRAL 1 LEVEL N/A 9/28/2018    BILATERAL L5 CACHORRO performed by Melecio Delaney MD at [facility] N/CARPAL TUNNEL SURG Right 8/29/2017    CARPAL TUNNEL RELEASE RIGHT performed by Francis Sewell MD at [facility] N/CARPAL TUNNEL SURG Left 10/31/2017    CARPAL TUNNEL RELEASE performed by Francis Sewell MD at [address] [place] [address] 12/29/2020    EGD BIOPSY performed by Ramakrishna Grey MD at [address] [place] [address] N/A 2/2/2021    EGD BIOPSY and spot marking performed by Shelley Griffith MD at [address] [place] [address] N/A 2/12/2021    ENDOSCOPIC ULTRASOUND, EGD performed by Everardo Diez MD at [place] [address]  2/12/2021    EGD DIAGNOSTIC ONLY performed by Everardo Diez MD at [place] [address] 8/31/2021    EGD BIOPSY performed by Shelley Griffith MD at [address] [place] [address] 1/21/2022    EGD BIOPSY performed by Shelley Griffith MD at [address] [place] [address] 4/15/2022    EGD ESOPHAGOGASTRODUODENOSCOPY performed by Ramakrishna Grey MD at [address] [place] [address] History:   Immunization History   Administered Date(s) Administered    Hepatitis A 09/20/2016, 03/29/2017    Hepatitis B (Recombivax HB) 09/20/2016, 10/19/2016, 03/29/2017    Influenza 11/29/2012    Influenza, Euernestoe Edmar, IM, (6 mo and older Fluzone, Flulaval, Fluarix and 3 yrs and older Afluria) 09/13/2017, 01/24/2019    Influenza, Quadv, IM, PF (6 mo and older Fluzone, Flulaval, Fluarix, and 3 yrs and older Afluria) 09/13/2016, 12/30/2020, 12/23/2021    Pneumococcal Polysaccharide (Fykumjvox20) 11/29/2012, 07/25/2016    Tdap (Boostrix, Adacel) 04/06/2017       Active Problems:  Patient Active Problem List   Diagnosis Code    Back pain, chronic M54.9, G89.29    Hearing difficulty H91.90    GERD (gastroesophageal reflux disease) K21.9    Cervical radicular pain M54.12    Alcohol withdrawal syndrome without complication (HCC) B57.719    Hyponatremia E87.1    Hypomagnesemia E83.42    Chronic viral hepatitis B without delta agent and without coma (HCC) B18.1    Calculus of gallbladder without cholecystitis K80.20    Hep C w/o coma, chronic (HCC) B18.2    Fatty liver K76.0    Psychophysiologic insomnia F51.04    Cirrhosis (HCC) K74.60    Hepatic cirrhosis (HCC) K74.60    Vertebrogenic low back pain M54.51    DDD (degenerative disc disease), lumbar M51.36    Depression F32. A    Tubular adenoma of colon D12.6    History of colon polyps Z86.010    Gynecomastia, male N62    Lumbar radiculitis M54.16    Lumbar disc herniation M51.26    Tinnitus H93.19    Eustachian tube dysfunction H69.80    Ganglion cyst M67.40    Carpal tunnel syndrome of right wrist G56.01    History of hepatitis C Z86.19    Vitamin D deficiency E55.9    Pure hypercholesterolemia E78.00    Hypokalemia E87.6    Essential hypertension I10    Recurrent major depressive disorder in partial remission (HCC) F33.41    S/P epidural steroid injection Z92.241    Elevated LFTs R79.89    Seasonal allergies J30.2    Lumbar facet arthropathy M47.816    Cervical facet syndrome M47.812    Acute gastrointestinal bleeding K92.2    Thrombocytopenia (HCC) D69.6    Hepatitis C virus infection resolved after antiviral drug therapy Z86.19    Gastrointestinal hemorrhage with melena K92.1    Alcohol abuse F10.10    Altered mental status R41.82    Hypocalcemia E83.51    Hypophosphatemia E83.39    Malignant neoplasm of lower third of esophagus (HCC) C15.5    COVID-19 U07.1    Anxiety F41.9    Current smoker F17.200    Severe comorbid illness R69    Gait instability R26.81    Abnormal findings on diagnostic imaging of spine R93.7    Cervical spinal stenosis M48.02    Spinal stenosis of lumbar region with neurogenic claudication M48.062    Low hemoglobin D64.9    Symptomatic anemia, microcytic, acute D64.9    Hypotension I95.9    Former smoker, 50+ pack years, quit  Z87.891    HLD (hyperlipidemia) E78.5    Esophageal adenocarcinoma (HCC) C15.9    Anemia D64.9    Acute kidney injury ([address] N17.9    Ascites R18.8       Isolation/Infection:   Isolation            No Isolation          Patient Infection Status       Infection Onset Added Last Indicated Last Indicated By Review Planned Expiration Resolved Resolved By    None active    Resolved    COVID-19 (Rule Out) 22 COVID-19, Rapid (Ordered)   22 Rule-Out Test Resulted    COVID-19 (Rule Out) 22 COVID-19 & Influenza Combo (Ordered)   22 Rule-Out Test Resulted    COVID-19 21 COVID-19   21     COVID-19 (Rule Out) 21 COVID-19 (Ordered)   21 Rule-Out Test Resulted    COVID-19 (Rule Out) 20 COVID-19 (Ordered)   20 Rule-Out Test Resulted    COVID-19 (Rule Out) 20 COVID-19 (Ordered)   20 Rule-Out Test Resulted            Nurse Assessment:  Last Vital Signs: /67   Pulse 62   Temp 97.9 °F (36.6 °C) (Oral)   Resp 18   Ht 5' 10\" (1.778 m)   Wt 231 lb 0.7 oz (104.8 kg)   SpO2 100%   BMI 33.15 kg/m²     Last documented pain score (0-10 scale): Pain Level: 10  Last Weight:   Wt Readings from Last 1 Encounters:   04/26/22 231 lb 0.7 oz (104.8 kg)     Mental Status:  oriented and alert    IV Access:  - None    Nursing Mobility/ADLs:  Walking   Assisted  Transfer  Assisted  Bathing  Independent  Dressing  Independent  Toileting  Assisted  Feeding  [address]  Independent  Med Delivery   whole    Wound Care Documentation and Therapy:        Elimination:  Continence: Bowel: Yes  Bladder: Yes  Urinary Catheter: None   Colostomy/Ileostomy/Ileal Conduit: No       Date of Last BM:     Intake/Output Summary (Last 24 hours) at 4/27/2022 1221  Last data filed at 4/27/2022 1002  Gross per 24 hour   Intake 970 ml   Output 03349 ml   Net -25405 ml     I/O last 3 completed shifts: In: 1 [P.O.:960; I.V.:10]  Out: -     Safety Concerns: At Risk for Falls    Impairments/Disabilities:      Vision    Nutrition Therapy:  Current Nutrition Therapy:   - Oral Diet:  General and Low Sodium (2gm)    Routes of Feeding: Oral  Liquids: No Restrictions  Daily Fluid Restriction: no  Last Modified Barium Swallow with Video (Video Swallowing Test): not done    Treatments at the Time of Hospital Discharge:   Respiratory Treatments:   Oxygen Therapy:  is not on home oxygen therapy. Ventilator:    - No ventilator support    Rehab Therapies: Physical Therapy and Occupational Therapy  Weight Bearing Status/Restrictions: No weight bearing restrictions  Other Medical Equipment (for information only, NOT a DME order):  cane and walker  Other Treatments: Skilled Nursing Assessment Per Protocol. Medication Education & Monitoring. LSW to assess for other community resources, including Home Meals.      Patient's personal belongings (please select all that are sent with patient):  Glasses, clothing    RN SIGNATURE:  Electronically signed by Jan Parson RN on 4/28/22 at 5:11 PM EDT    CASE MANAGEMENT/SOCIAL WORK SECTION    Inpatient Status Date:     Readmission Risk Assessment Score:  Readmission Risk              Risk of Unplanned Readmission:  0           Discharging to Facility/ [address]  [address]  [address] [address]  Phone [phone]  Fax  [phone]     Dialysis Facility (if applicable)   Name:  Address:  Dialysis Schedule:  Phone:  Fax:    / signature: Electronically signed by Jose Werner RN on 4/27/22 at 1:32 PM EDT    PHYSICIAN SECTION    Prognosis: Good    Condition at Discharge: Stable    Rehab Potential (if transferring to Rehab): Good    Recommended Labs or Other Treatments After Discharge:     Physician Certification: I certify the above information and transfer of Orestes Kimball  is necessary for the continuing treatment of the diagnosis listed and that he requires [address] for less 30 days.      Update Admission H&P: No change in H&P    PHYSICIAN SIGNATURE:  Electronically signed by Sanchez Conrad MD on 4/27/22 at 12:21 PM EDT

## 2022-04-27 NOTE — PLAN OF CARE
PRE CONSULT ROUNDING NOTE  HPI  57 year old male with pmh of cirrhosis secondary to etoh abuse, hepatitis c treated with harvoni GERD esophageal cancer, esohpageal varices ,htn who presented to the ED for shortness of breath and abdominal distention. Our service is consulted for cirrhosis. He was seen by our service earlier this month for rectal bleeding and underwent endoscopy. He has had multiple paracentesis in the past negative for sbp. He was seen by dr Gianna Aggarwal yesterday and reported increased abdominal distention he states he was taking his diuretics its not clear if he was following a low salt diet or not. He is scheduled for a paracentesis today. He denies significant abdominal pain, reports melena but is taking iron pills. No fevers or chills. Creat today is normal. afp normal on 4/13/22 alk phos 261 alt 66 ast 95 bili 2.84 hgb 9 no leucocytosis. Ct abd shows patulous esophagus with mild thickening of the esophagus, periesophageal varices, cirrhosis with ascites and cholelithiasis. He denies fevers chills weight loss dysphagia hematochezia hematemesis change in the bowel habits rash.        Endoscopy 4/13/22 egd (daboul) inflamed lower esophagus, radiation esophagitis cardia with hypervascularity with tiny oozing 5cm above the GE junction he has inflammatory polypoidal lesion that was not biopsied small hh portal htn 4/16/22 colonoscopy (pangular)small hemorrhoids flat polyp right colon bx showed tubular adenoma, lesion in the sigmoid bx showed adenoma with acute inflammation    Family reports  reports dad with liver disease, no stomach colon or liver cancer mom had pancreatic cancer,  no uc/crohns  Social quit smoking no etoh in the last several weeks no illicit drugs  /34   Pulse 67   Temp 97.9 °F (36.6 °C) (Oral)   Resp 16   Ht 5' 10\" (1.778 m)   Wt 231 lb 0.7 oz (104.8 kg)   SpO2 100%   BMI 33.15 kg/m²     ROS as above meds labs imaging and past medical records were reviewed    Exam  General Appearance: alert and oriented to person, place and time, well-developed and well-nourished, in no acute distress  Skin: warm and dry, no rash or erythema  Head: normocephalic and atraumatic  Eyes: pupils equal, round, and reactive to light, extraocular eye movements intact, conjunctivae normal  ENT: hearing grossly normal bilaterally  Neck: neck supple and non tender without mass, no thyromegaly or thyroid nodules, no cervical lymphadenopathy   Pulmonary/Chest: clear to auscultation bilaterally- no wheezes, rales or rhonchi, normal air movement, no respiratory distress  Cardiovascular: normal rate, regular rhythm, normal S1 and S2, no murmurs, rubs, clicks or gallops, distal pulses intact, no carotid bruits  Abdomen: soft, obese non tender, distended, normal bowel sounds, no masses or organomegaly positive for  ascites  Extremities: no cyanosis, clubbing or edema  Musculoskeletal: normal range of motion, no joint swelling, deformity or tenderness  Neurologic: no cranial nerve deficit and muscle strength normal    Assessment  Cirrhosis with ascites secondary to alcohol abuse and treated hepatitis c  Hx esophageal cancer  Anemia hx iron deficiency    Plan  Will d/w md  To have paracentesis today, labs ordered  Low salt diet  Will discuss diuretics with md  Avoid sedatives and narcotics  Continue xifaxin  Trend hh  Formal gi consult to follow  . MASSIMO Weller - CNP

## 2022-04-27 NOTE — CARE COORDINATION
Patient is from [facility]. Patient can return, but will need a new Precert. Patient will also need PT/OT notes in . SW will continue to follow. Electronically signed by Batsheva Sanford on 4/27/22 at 12:08 PM EDT    Addendum: SW checking if Pt can return without a precert as he has been here less than 24 hours.      Electronically signed by Batsheva Sanford on 4/27/22 at 12:17 PM EDT

## 2022-04-27 NOTE — ED NOTES
Mode of arrival (squad #, walk in, police, etc) : Walk in        Chief complaint(s): SOB        Arrival Note (brief scenario, treatment PTA, etc). : Pt arrived from Dr. Alexa Lake office with complaints of SOB. SOB has been ongoing for 6 months. Pt not on oxygen he is 100% on RA. Pt states he is in need of paracentesis. Stomach it rounded and distended with ascites. Pt does not use oxygen at home. Pt is from [facility]. Pt alert and oriented x4.         C= \"Have you ever felt that you should Cut down on your drinking? \"  No  A= \"Have people Annoyed you by criticizing your drinking? \"  No  G= \"Have you ever felt bad or Guilty about your drinking? \"  No  E= \"Have you ever had a drink as an Eye-opener first thing in the morning to steady your nerves or to help a hangover? \"  No      Deferred []      Reason for deferring: N/A    *If yes to two or more: probable alcohol abuse. Sariah Jin RN  04/26/22 [id number]

## 2022-04-28 VITALS
BODY MASS INDEX: 33.08 KG/M2 | RESPIRATION RATE: 20 BRPM | HEIGHT: 70 IN | HEART RATE: 62 BPM | WEIGHT: 231.04 LBS | OXYGEN SATURATION: 100 % | TEMPERATURE: 97.7 F | SYSTOLIC BLOOD PRESSURE: 108 MMHG | DIASTOLIC BLOOD PRESSURE: 70 MMHG

## 2022-04-28 LAB
ANION GAP SERPL CALCULATED.3IONS-SCNC: 7 MMOL/L (ref 9–17)
APPEARANCE FLUID: ABNORMAL
BASO FLUID: 0 %
BUN BLDV-MCNC: 9 MG/DL (ref 8–23)
CALCIUM SERPL-MCNC: 8.4 MG/DL (ref 8.6–10.4)
CHLORIDE BLD-SCNC: 104 MMOL/L (ref 98–107)
CO2: 22 MMOL/L (ref 20–31)
COLOR FLUID: YELLOW
CREAT SERPL-MCNC: 0.5 MG/DL (ref 0.7–1.2)
EKG ATRIAL RATE: 61 BPM
EKG P AXIS: 42 DEGREES
EKG P-R INTERVAL: 152 MS
EKG Q-T INTERVAL: 438 MS
EKG QRS DURATION: 68 MS
EKG QTC CALCULATION (BAZETT): 440 MS
EKG R AXIS: 15 DEGREES
EKG T AXIS: 1 DEGREES
EKG VENTRICULAR RATE: 61 BPM
EOSINOPHIL FLUID: 0 %
FLUID DIFF COMMENT: ABNORMAL
GFR AFRICAN AMERICAN: >60 ML/MIN
GFR NON-AFRICAN AMERICAN: >60 ML/MIN
GFR SERPL CREATININE-BSD FRML MDRD: ABNORMAL ML/MIN/{1.73_M2}
GLUCOSE BLD-MCNC: 107 MG/DL (ref 70–99)
HCT VFR BLD CALC: 21.6 % (ref 41–53)
HCT VFR BLD CALC: 24.3 % (ref 41–53)
HEMOGLOBIN: 7.1 G/DL (ref 13.5–17.5)
HEMOGLOBIN: 7.9 G/DL (ref 13.5–17.5)
LYMPHOCYTES, BODY FLUID: 3 %
MCH RBC QN AUTO: 29.9 PG (ref 26–34)
MCHC RBC AUTO-ENTMCNC: 32.8 G/DL (ref 31–37)
MCV RBC AUTO: 91.3 FL (ref 80–100)
MONOCYTE, FLUID: 83 %
NEUTROPHIL, FLUID: 14 %
PDW BLD-RTO: 24.6 % (ref 11.5–14.9)
PLATELET # BLD: 158 K/UL (ref 150–450)
PMV BLD AUTO: 7.5 FL (ref 6–12)
POTASSIUM SERPL-SCNC: 4.3 MMOL/L (ref 3.7–5.3)
RBC # BLD: 2.37 M/UL (ref 4.5–5.9)
RBC FLUID: 122 /MM3
SODIUM BLD-SCNC: 133 MMOL/L (ref 135–144)
SPECIMEN TYPE: ABNORMAL
WBC # BLD: 4.1 K/UL (ref 3.5–11)
WBC FLUID: 88 /MM3

## 2022-04-28 PROCEDURE — 93010 ELECTROCARDIOGRAM REPORT: CPT | Performed by: INTERNAL MEDICINE

## 2022-04-28 PROCEDURE — 97165 OT EVAL LOW COMPLEX 30 MIN: CPT

## 2022-04-28 PROCEDURE — 85014 HEMATOCRIT: CPT

## 2022-04-28 PROCEDURE — G0378 HOSPITAL OBSERVATION PER HR: HCPCS

## 2022-04-28 PROCEDURE — 99214 OFFICE O/P EST MOD 30 MIN: CPT | Performed by: INTERNAL MEDICINE

## 2022-04-28 PROCEDURE — 85018 HEMOGLOBIN: CPT

## 2022-04-28 PROCEDURE — 85027 COMPLETE CBC AUTOMATED: CPT

## 2022-04-28 PROCEDURE — 6370000000 HC RX 637 (ALT 250 FOR IP): Performed by: NURSE PRACTITIONER

## 2022-04-28 PROCEDURE — 2580000003 HC RX 258: Performed by: NURSE PRACTITIONER

## 2022-04-28 PROCEDURE — 80048 BASIC METABOLIC PNL TOTAL CA: CPT

## 2022-04-28 PROCEDURE — APPSS30 APP SPLIT SHARED TIME 16-30 MINUTES: Performed by: NURSE PRACTITIONER

## 2022-04-28 PROCEDURE — 97161 PT EVAL LOW COMPLEX 20 MIN: CPT

## 2022-04-28 PROCEDURE — 36415 COLL VENOUS BLD VENIPUNCTURE: CPT

## 2022-04-28 RX ADMIN — POTASSIUM CHLORIDE 20 MEQ: 20 TABLET, EXTENDED RELEASE ORAL at 08:15

## 2022-04-28 RX ADMIN — FERROUS SULFATE TAB 325 MG (65 MG ELEMENTAL FE) 325 MG: 325 (65 FE) TAB at 08:15

## 2022-04-28 RX ADMIN — FUROSEMIDE 20 MG: 20 TABLET ORAL at 08:15

## 2022-04-28 RX ADMIN — LACTULOSE 20 G: 20 SOLUTION ORAL at 13:29

## 2022-04-28 RX ADMIN — SPIRONOLACTONE 100 MG: 25 TABLET, FILM COATED ORAL at 08:15

## 2022-04-28 RX ADMIN — FLUOXETINE HYDROCHLORIDE 20 MG: 20 CAPSULE ORAL at 08:15

## 2022-04-28 RX ADMIN — Medication 1 CAPSULE: at 08:15

## 2022-04-28 RX ADMIN — SODIUM CHLORIDE, PRESERVATIVE FREE 10 ML: 5 INJECTION INTRAVENOUS at 08:15

## 2022-04-28 RX ADMIN — NADOLOL 20 MG: 20 TABLET ORAL at 08:16

## 2022-04-28 RX ADMIN — RIFAXIMIN 550 MG: 550 TABLET ORAL at 08:15

## 2022-04-28 RX ADMIN — LACTULOSE 20 G: 20 SOLUTION ORAL at 08:16

## 2022-04-28 RX ADMIN — PANTOPRAZOLE SODIUM 40 MG: 40 TABLET, DELAYED RELEASE ORAL at 06:33

## 2022-04-28 ASSESSMENT — PAIN SCALES - GENERAL: PAINLEVEL_OUTOF10: 0

## 2022-04-28 NOTE — PROGRESS NOTES
[facility]    Gastroenterology Daily Progress Note      Patient:   Antonella Grant   :       [facility]  Date:     2022  Consultant:   MASSIMO Shaver CNP, CNP      SUBJECTIVE  57 y.o. male admitted 2022 with Shortness of breath [R06.02]  Ascites [R18.8]  Other ascites [R18.8] and seen for cirrhosis with melena andhx of esophageal cancer. The pt was seen and examined. hgb 7.1. reports one episode of black diarrhea but the pt is taking iron pills. No c/o nausea or abdominal pain. Loyd Tran He underwent paracentesis yesterday with removal of 11L of fluid negative for sbp. , creat normal today.          OBJECTIVE  Scheduled Meds:   sodium chloride flush  5-40 mL IntraVENous 2 times per day    atorvastatin  20 mg Oral Nightly    ferrous sulfate  325 mg Oral BID    FLUoxetine  20 mg Oral Daily    furosemide  20 mg Oral BID    lactulose  20 g Oral TID    nadolol  20 mg Oral Daily    pantoprazole  40 mg Oral QAM AC    potassium chloride  20 mEq Oral Daily    lactobacillus  1 capsule Oral BID    rifAXIMin  550 mg Oral BID    spironolactone  100 mg Oral QAM    spironolactone  50 mg Oral QPM       Vital Signs:  BP (!) 85/53   Pulse 74   Temp 98.1 °F (36.7 °C)   Resp 14   Ht 5' 10\" (1.778 m)   Wt 231 lb 0.7 oz (104.8 kg)   SpO2 97%   BMI 33.15 kg/m²      Physical Exam:   General Appearance: alert and oriented to person, place and time, well-developed and well-nourished, in no acute distress  Skin: warm and dry, no rash or erythema  Head: normocephalic and atraumatic  Eyes: pupils equal, round, and reactive to light, extraocular eye movements intact, conjunctivae normal  ENT: hearing grossly normal bilaterally  Neck: neck supple and non tender without mass, no thyromegaly or thyroid nodules, no cervical lymphadenopathy   Pulmonary/Chest: clear to auscultation bilaterally- no wheezes, rales or rhonchi, normal air movement, no respiratory distress  Cardiovascular: normal rate, regular rhythm, normal S1 and S2, no murmurs, rubs, clicks or gallops, distal pulses intact, no carotid bruits  Abdomen: soft, non-tender, distended, normal bowel sounds, no masses or organomegaly  Extremities: no cyanosis, clubbing or edema  Musculoskeletal: normal range of motion, no joint swelling, deformity or tenderness  Neurologic: no cranial nerve deficit and muscle strength normal    Lab and Imaging Review     CBC  Recent Labs     04/26/22  1830 04/26/22  1830 04/27/22  0109 04/27/22  0624 04/28/22  0605   WBC 8.4  --   --  5.5 4.1   HGB 9.5*   < > 9.2* 9.0* 7.1*   HCT 29.7*   < > 28.4* 28.2* 21.6*   MCV 91.2  --   --  92.2 91.3     --   --  218 158    < > = values in this interval not displayed. BMP  Recent Labs     04/26/22  1830 04/27/22  0624 04/28/22  0605   * 132* 133*   K 4.6 4.4 4.3    102 104   CO2 21 20 22   BUN 9 10 9   CREATININE 0.46* 0.47* 0.50*   GLUCOSE 88 84 107*   CALCIUM 8.9 8.9 8.4*       LFTS  Recent Labs     04/26/22  1830   ALKPHOS 261*   ALT 66*   AST 95*   PROT 5.4*   BILITOT 2.84*   BILIDIR 1.43*   LABALBU 3.1*       AMYLASE/LIPASE/AMMONIA  Recent Labs     04/26/22  0931 04/26/22 1830   LIPASE  --  20   AMMONIA 14*  --        PT/INR  Recent Labs     04/26/22 1830   PROTIME 15.5*   INR 1.2   Results for Pritesh Alcantar (MRN [id number]) as of 4/28/2022 17:16 paracentesis    Ref.  Range 4/27/2022 09:00   Appearance, Fluid Unknown HAZY   Basos, Fluid Latest Ref Range: 0 % 0   Color, Fluid Unknown YELLOW   Eos, Fluid Latest Ref Range: 0 % 0   RBC, Fluid Latest Units: /mm3 122   Lymphocytes, Body Fluid Latest Ref Range: 0 % 3 (H)   Monocyte Count, Fluid Latest Ref Range: 0 % 83 (H)   Neutrophil Count, Fluid Latest Ref Range: 0 % 14 (H)   Total Protein, Body Fluid Latest Units: g/dL <1.0   WBC, Fluid Latest Units: /mm3 88   Albumin, Fluid Latest Units: g/dL 0.7     CT CHEST ABDOMEN PELVIS W CONTRAST     Result Date: 4/25/2022  EXAMINATION: CT OF THE CHEST, ABDOMEN, AND PELVIS WITH CONTRAST 4/25/2022 9:59 am FINDINGS: Chest: Mediastinum: The cardiac size is normal. Aortic and coronary artery calcifications. No significant mediastinal lymphadenopathy. .  Thyroid gland grossly normal in visualized portions. Patulous esophagus with intraluminal contrast.  Mild thickening of the esophagus most pronounced in the distal 3rd is noted. The appears to be a sliding hiatal hernia as well. Some periesophageal varices are noted similar to prior. Lungs/pleura: Calcified lingular nodule. No follow-up required. No evidence for metastatic nodules. There is mild centrilobular emphysema. Central airways unremarkable. Soft Tissues/Bones: Right-sided infusion port terminates in SVC. No acute bony abnormalities. No aggressive lytic or blastic lesions. No significant superficial soft tissue lesion. Abdomen/Pelvis: Organs: Nodular shrunken cirrhotic liver in keeping with history of cirrhosis. No focal liver lesion. No splenomegaly. Pancreas, adrenal glands, kidneys show no significant abnormalities. Cholelithiasis noted. GI/Bowel: Small sliding hiatal hernia is suggested. Small bowel loops show no acute process or focal lesions. Unremarkable colon. No evidence for bowel obstruction. Pelvis: Urinary bladder is unremarkable. Peritoneum/Retroperitoneum: Large volume ascites increased from prior. Periesophageal varices as noted above. Normal enhancement of portal and splenic vein. No evidence for thrombosis. 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 There is no significant lymphadenopathy. Bones/Soft Tissues: No acute bony abnormalities. Diffuse degenerative changes in the spine. No aggressive lytic or blastic lesions. No significant superficial soft tissue lesion.      1. No clear evidence for metastatic disease.  2. Mild diffuse thickening of the esophagus most pronounced in the lower portion where there also appears to be a small sliding hiatal hernia. Consistent with history of esophageal cancer. 3. Cirrhotic liver with a few periesophageal varices. 4. Large volume ascites increased from prior. 5. Cholelithiasis unchanged. ASSESSMENT/plan  1. Cirrhosis secondary to etoh abuse and treated hepatitis c  -s/p paracentesis yesterday negative for sbp  -continue the diuretics  -may need prn outpt paracentesis  -2gm low salt diet  -continue lactulose and xifaxin    2.anemia; no overt bleeding, hx iron deficiency  Trend hh; will recheck at 1600 today and if no decrease noted he is ok to be discharged to home with follow up    3.hx esophageal cancer  outpt repeat egd    D/w md gi signing off    This plan was formulated in collaboration with Dr. John Lundy .     Electronically signed by: MASSIMO Cr CNP on 4/28/2022 at 7:44 AM

## 2022-04-28 NOTE — CARE COORDINATION
ONGOING DISCHARGE PLAN:    Patient is alert and oriented x4. Spoke with patient regarding discharge plan and patient confirms that plan is still to return to home w/ VNS, [facility]. Pt. Had, Paracentesis, 11 Liters taken off yesterday. HGB today 7.1. GI following. PT/OT on board. Will continue to follow for additional discharge needs.     Electronically signed by Chaka Verma RN on 4/28/2022 at 9:48 AM

## 2022-04-28 NOTE — DISCHARGE SUMMARY
[address]    Discharge Summary     Patient ID: Walt Antunez  :  [id number]   MRN: [id number]     ACCOUNT:  [de-identified]   Patient's PCP: VASU Garnett  Admit Date: 2022   Discharge Date: 2022     Length of Stay: 0  Code Status:  Full Code  Admitting Physician: Martir Brown MD  Discharge Physician: Martin Phillip MD     Active Discharge Diagnoses:       Primary Problem  Ascites      Hospital Problems  Active Hospital Problems    Diagnosis Date Noted    Ascites [R18.8] 2022     Priority: Medium       Admission Condition:  poor     Discharged Condition: poor    Hospital Stay:       Hospital Course:  Walt Antunez is a 57 y.o. male who was admitted for the management of   Ascites , presented to ER with Shortness of Breath    PMH of alcoholic liver disease/cirrhosis, history of hepatitis C (treated).  He is a former smoker with 50+ pack year history, and admits to former heavy alcohol use.  Denies illicit or IV drug use. Patient also has a history of GE junction adenocarcinoma s/p chemoradiation completed 21 and PET 21 with no recurrence, as well as most recent EGD on 22 which was negative for malignanacy.  This EGD also showed severe portal hypertensive gastropathy with some oozing of blood visualized.     He presented to the emergency department today from pain management clinic where he was scheduled to have an epidural steroid injection for back pain due to spinal stenosis.  However, he was unable to lie prone due to abdominal distention and shortness of breath and he was sent to the emergency department.      According to the patient, he was scheduled for therapeutic paracentesis today at [facility] this afternoon.  His most recent paracentesis was 2022 with 7.5 L removed.  Per patient, he states that it accumulated quickly afterwards and came back Nunez Isai. \"  This was his second time having paracentesis done; previous paracentesis done in 2016.  He states that he has not been taking his spironolactone for the past few days, as he felt that it was making his blood pressure too low.  He reports that he took all other home medications, though.     Currently, patient states he has some shortness of breath which she attributes to abdominal distention, as well as nausea. However, denies  fever, chills, chest pain, abdominal pain, hematuria, or dark/bloody stool.  Last bowel movement was yesterday     In the emergency department, hemoglobin was found to be 3.6, recheck 3.5. Sodium 120.  Creatinine 1.42, baseline within normal limits.  Total bili 2.4.  INR 1.5.       He was given a dose of Protonix and 1 unit PRBCs in the ED.     He is being admitted to the hospital for the management of severe anemia and sepsis. The next day pt received 3 units PRBCs, venofer added per heme/onc     Patient was feeling extremely uncomfortable and had an IR guided paracentesis 5L removed, FOBT positive, bowel movement revealed bright red blood per rectum; hgb 6.9 despite receiving 6 units of blood and FFP, emergent EGD overnight following additional episodes of hematochezia, no annamaria bleeding noted. Patient later got a colonoscopy which did not find any source of bleeding. Hemoglobin stabilized and platelets normalized. Diet was advanced.     For ultrasound showed patent portal vein. Spironolactone, lasix, and nadolol restarted, paracentesis done again, 4.8L removed     Results of colon biopsies consistent with tubular adenoma and adenoma (low grade dysplasia) with acute inflammation, platelets decreasing    Patient was in stable condition. Advised to follow-up with GI as an outpatient and pursuing weekly paracentesis. Significant therapeutic interventions: Multiple PRBC transfusions, paracentesis, colonoscopy revealing a tubular adenoma.     Significant Diagnostic Studies:   Labs / Micro:  CBC:   Lab Results Component Value Date    WBC 5.5 04/27/2022    RBC 3.06 04/27/2022    RBC 4.75 04/19/2012    HGB 9.0 04/27/2022    HCT 28.2 04/27/2022    MCV 92.2 04/27/2022    MCH 29.5 04/27/2022    MCHC 32.0 04/27/2022    RDW 25.2 04/27/2022     04/27/2022     04/19/2012     CMP:    Lab Results   Component Value Date    GLUCOSE 84 04/27/2022    GLUCOSE 65 04/19/2012     04/27/2022    K 4.4 04/27/2022     04/27/2022    CO2 20 04/27/2022    BUN 10 04/27/2022    CREATININE 0.47 04/27/2022    ANIONGAP 10 04/27/2022    ALKPHOS 261 04/26/2022    ALT 66 04/26/2022    AST 95 04/26/2022    BILITOT 2.84 04/26/2022    LABALBU 3.1 04/26/2022    LABALBU 4.7 04/19/2012    ALBUMIN NOT REPORTED 02/02/2022    LABGLOM >60 04/27/2022    GFRAA >60 04/27/2022    GFR      04/27/2022    PROT 5.4 04/26/2022    CALCIUM 8.9 04/27/2022          Radiology:    US LIVER    Result Date: 4/19/2022  EXAMINATION: RIGHT UPPER QUADRANT ULTRASOUND 4/19/2022 9:24 am COMPARISON: None. HISTORY: ORDERING SYSTEM PROVIDED HISTORY: evaluate portal/hepatic vein, r/o PVT TECHNOLOGIST PROVIDED HISTORY: evaluate portal/hepatic vein, r/o PVT FINDINGS: LIVER:  Nodular margins of the liver would suggest hepatic cirrhosis. No focal lesion. Liver 15.6 cm in length. Hepatopetal flow portal vein. BILIARY SYSTEM:  Gallstones and sludge within the gallbladder. No wall thickening. Negative sonographic Silva's sign. Common bile duct is within normal limits measuring 4.6 mm. OTHER: Moderate ascites visualized right upper quadrant. 1. Hepatic cirrhosis. No focal mass. Hepatopetal flow portal vein. 2. Gallstones and sludge within the gallbladder.  3. Ascites RECOMMENDATIONS: Unavailable     XR CHEST PORTABLE    Result Date: 4/15/2022  EXAMINATION: ONE XRAY VIEW OF THE CHEST 4/15/2022 7:59 am COMPARISON: 02/02/2022, 12/21/2021 HISTORY: ORDERING SYSTEM PROVIDED HISTORY: Dyspnea TECHNOLOGIST PROVIDED HISTORY: Dyspnea Reason for Exam: Dyspnea FINDINGS: Right chest port terminates in the superior vena cava. Mild pulmonary vascular congestion. No focal pulmonary opacities. Calcified granulomata and calcified mediastinal nodes from prior granulomatous infection. Cardiomegaly which is similar to 4 months prior. No acute bony findings. Mild pulmonary vascular congestion. Cardiomegaly. IR US GUIDED PARACENTESIS    Result Date: 4/18/2022  PROCEDURE: PARACENTESIS WITH IMAGE GUIDANCE US ABDOMEN LIMITED 4/18/2022 HISTORY: ORDERING SYSTEM PROVIDED HISTORY: worsening acites and SOB TECHNOLOGIST PROVIDED HISTORY: worsening acites and SOB Please remove no more than 5 L TECHNIQUE: Informed consent was obtained after a detailed explanation of the procedure including risks, benefits, and alternatives. Universal protocol was followed. A limited ultrasound of the abdomen was performed and shows large amount of ascites. The right abdomen was prepped and draped in sterile fashion and local anesthesia was achieved with lidocaine. A 5 Bulgarian needle sheath was advanced into ascites using realtime ultrasound guidance and paracentesis was performed. The patient tolerated the procedure well. EBL: None FINDINGS: Limited ultrasound of the abdomen demonstrates ascites. A total of 4.8 L of clear yellow fluid was removed. Successful ultrasound guided paracentesis. IR US GUIDED PARACENTESIS    Result Date: 4/14/2022  PROCEDURE: PARACENTESIS WITH IMAGE GUIDANCE US ABDOMEN LIMITED 4/14/2022 HISTORY: ORDERING SYSTEM PROVIDED HISTORY: ascites remove 5 liters only TECHNOLOGIST PROVIDED HISTORY: ascites remove 5 liters only TECHNIQUE: Informed consent was obtained after a detailed explanation of the procedure including risks, benefits, and alternatives. Universal protocol was followed. A limited ultrasound of the abdomen was performed and shows a moderate to large amount of ascites.   The right abdomen was prepped and draped in sterile fashion and local anesthesia was achieved with lidocaine. A 5 Georgian needle sheath was advanced into ascites using realtime ultrasound guidance and paracentesis was performed. The patient tolerated the procedure well. EBL: None FINDINGS: Limited ultrasound of the abdomen demonstrates ascites. A total of 5 L of slightly turbid yellow colored fluid was removed. Additional fluid remains on completion. Successful ultrasound-guided paracentesis. IR US GUIDED PARACENTESIS    Result Date: 4/5/2022  PROCEDURE: IR US GUIDED PARACENTESIS W IMAGING 4/5/2022 HISTORY: ORDERING SYSTEM PROVIDED HISTORY: Ascites due to alcoholic cirrhosis (HCC) TECHNIQUE: Sonography was utilized CONTRAST: None SEDATION: None FLUOROSCOPY DOSE AND TYPE OR TIME AND EXPOSURES: None DESCRIPTION OF PROCEDURE: Informed consent was obtained after a detailed explanation of the procedure including risks, benefits, and alternatives. Universal protocol was observed. Preprocedure ultrasound shows a dominant fluid pocket in the right lowerquadrant. Using ultrasound guidance (image attached to the medical record), the fluid pocket was accessed with a 5 Western Lorrie Yueh needle with aspiration of inch clear yellow fluid. Vacuum bottle paracentesis was performed and approximately 7.25 L was removed. the patient tolerated the procedure well and left the department in good condition. Dr. Grabiel Hull was present. Patient received IV albumin replacement. FINDINGS: 7.25 L drained     Successful ultrasound-guided therapeutic paracentesis     CT CHEST ABDOMEN PELVIS W CONTRAST    Result Date: 4/25/2022  EXAMINATION: CT OF THE CHEST, ABDOMEN, AND PELVIS WITH CONTRAST 4/25/2022 9:59 am TECHNIQUE: CT of the chest, abdomen and pelvis was performed with the administration of intravenous contrast. Multiplanar reformatted images are provided for review.  Dose modulation, iterative reconstruction, and/or weight based adjustment of the mA/kV was utilized to reduce the radiation dose to as low as reasonably achievable. COMPARISON: 01/31/2022 HISTORY: ORDERING SYSTEM PROVIDED HISTORY: Esophageal adenocarcinoma ([facility] [address] TECHNOLOGIST PROVIDED HISTORY: follow up Reason for Exam: follow up esophageal cancer Additional signs and symptoms: pt sts increasing abdominal pain and shortness of breath. Sts bloating. FINDINGS: Chest: Mediastinum: The cardiac size is normal. Aortic and coronary artery calcifications. No significant mediastinal lymphadenopathy. .  Thyroid gland grossly normal in visualized portions. Patulous esophagus with intraluminal contrast.  Mild thickening of the esophagus most pronounced in the distal 3rd is noted. The appears to be a sliding hiatal hernia as well. Some periesophageal varices are noted similar to prior. Lungs/pleura: Calcified lingular nodule. No follow-up required. No evidence for metastatic nodules. There is mild centrilobular emphysema. Central airways unremarkable. Soft Tissues/Bones: Right-sided infusion port terminates in SVC. No acute bony abnormalities. No aggressive lytic or blastic lesions. No significant superficial soft tissue lesion. Abdomen/Pelvis: Organs: Nodular shrunken cirrhotic liver in keeping with history of cirrhosis. No focal liver lesion. No splenomegaly. Pancreas, adrenal glands, kidneys show no significant abnormalities. Cholelithiasis noted. GI/Bowel: Small sliding hiatal hernia is suggested. Small bowel loops show no acute process or focal lesions. Unremarkable colon. No evidence for bowel obstruction. Pelvis: Urinary bladder is unremarkable. Peritoneum/Retroperitoneum: Large volume ascites increased from prior. Periesophageal varices as noted above. Normal enhancement of portal and splenic vein. No evidence for thrombosis. 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 There is no significant lymphadenopathy. Bones/Soft Tissues: No acute bony abnormalities. Diffuse degenerative changes in the spine.   No aggressive lytic or blastic lesions. No significant superficial soft tissue lesion. 1. No clear evidence for metastatic disease. 2. Mild diffuse thickening of the esophagus most pronounced in the lower portion where there also appears to be a small sliding hiatal hernia. Consistent with history of esophageal cancer. 3. Cirrhotic liver with a few periesophageal varices. 4. Large volume ascites increased from prior. 5. Cholelithiasis unchanged. Consultations:    Consults:     Final Specialist Recommendations/Findings:   IP CONSULT TO INTERNAL MEDICINE  IP CONSULT TO GI  IP CONSULT TO SOCIAL WORK      The patient was seen and examined on day of discharge and this discharge summary is in conjunction with any daily progress note from day of discharge. Discharge plan:       Disposition: To a non-[facility] facility    Physician Follow Up:     [facility] [address] [facility]  [phone]    They will call you at home to set up a time to come to your house. Requiring Further Evaluation/Follow Up POST HOSPITALIZATION/Incidental Findings: Follow-up colonoscopy results outpatient, continue to follow-up with GI for weekly paracentesis    Diet:  Cirrhosis diet    Activity: As tolerated    Instructions to Patient: Take medication as prescribed. Follow-up with PCP and GI. If symptoms of fever, nausea, chills recur please return to the ED.     Discharge Medications:      Medication List      CONTINUE taking these medications    atorvastatin 20 MG tablet  Commonly known as: LIPITOR  Take 1 tablet by mouth nightly     cyclobenzaprine 10 MG tablet  Commonly known as: FLEXERIL     ferrous sulfate 325 (65 Fe) MG tablet  Commonly known as: IRON 325  Take 1 tablet by mouth 2 times daily     FLUoxetine 20 MG capsule  Commonly known as: PROZAC  Take 1 capsule by mouth daily     furosemide 40 MG tablet  Commonly known as: LASIX  Take 0.5 tablets by mouth 2 times daily     lactulose 10 GM/15ML solution  Commonly known as: CHRONULAC  Take 30 mLs by mouth 3 times daily     lidocaine-prilocaine 2.5-2.5 % cream  Commonly known as: EMLA  Apply topically 45-60 mins prior to needle poke daily PRN     midodrine 5 MG tablet  Commonly known as: PROAMATINE  Take 1 tablet by mouth 3 times daily as needed (SBP <110)     nadolol 20 MG tablet  Commonly known as: Corgard  Take 1 tablet by mouth daily     omeprazole 40 MG delayed release capsule  Commonly known as: PRILOSEC  take 1 capsule by mouth EVERY MORNING BEFORE BREAKFAST     ondansetron 4 MG disintegrating tablet  Commonly known as: ZOFRAN-ODT  Take 1 tablet by mouth every 8 hours as needed for Nausea or Vomiting     oxyCODONE-acetaminophen 5-325 MG per tablet  Commonly known as: PERCOCET     Probiotic Acidophilus Tabs  Take 1 tablet by mouth 2 times daily     rifAXIMin 550 MG tablet  Commonly known as: XIFAXAN  Take 1 tablet by mouth 2 times daily     * spironolactone 100 MG tablet  Commonly known as: ALDACTONE  Take 1 tablet by mouth every morning     * spironolactone 50 MG tablet  Commonly known as: ALDACTONE  Take 1 tablet by mouth every evening         * This list has 2 medication(s) that are the same as other medications prescribed for you. Read the directions carefully, and ask your doctor or other care provider to review them with you. STOP taking these medications    potassium chloride 20 MEQ extended release tablet  Commonly known as: Parminder Party            Electronically signed by   Esme Acosta MD  4/27/2022  11:12 PM      Thank you VASU Hoover for the opportunity to be involved in this patient's care. Attending Physician Statement  I have discussed the care of Troy Cuellar and I have examined the patient myselft and taken ros and hpi , including pertinent history and exam findings,  with the resident. I have reviewed the key elements of all parts of the encounter with the resident.   I agree with the DC plan as documented by the resident.       Electronically signed by Kenzie Thomas MD

## 2022-04-28 NOTE — PROGRESS NOTES
Occupational Therapy  Facility/Department: [facility]  Occupational Therapy Initial Assessment    Name: Omi Serrano  :   MRN: [id number]  Date of Service: 2022    Discharge Recommendations:  Pt may need non-skilled A at discharge. OT Equipment Recommendations  Equipment Needed: No     Patient Diagnosis(es): The primary encounter diagnosis was Shortness of breath. A diagnosis of Other ascites was also pertinent to this visit. 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 ([address] Anxiety, Arthritis, Back pain, chronic, Lezama esophagus, BPH (benign prostatic hypertrophy), Cholelithiasis, Cirrhosis ([address] COVID-19, COVID-19 vaccine series completed, DDD (degenerative disc disease), lumbar, Depression, Esophageal cancer ([address] Esophageal varices ([address] Fatty liver, GERD (gastroesophageal reflux disease), Hep C w/o coma, chronic ([address] History of alcohol abuse, History of blood transfusion, History of colon polyps, History of tobacco abuse, [place] (hard of hearing), Hyperlipidemia, Hypertension, Port-A-Cath in place, Sciatica, Spinal stenosis, Stomach ulcer, Tubular adenoma of colon, Vitamin D deficiency, and Wears glasses. Past Surgical History:  has a past surgical history that includes Bunionectomy; Nasal septum surgery; other surgical history (16); Colonoscopy; Colonoscopy (10/05/2016); other surgical history (2016); other surgical history (2016); knee surgery (Left); Bunionectomy (Left); Endoscopy, colon, diagnostic; pr revise median n/carpal tunnel surg (Right, 2017); Carpal tunnel release (Right); pr revise median n/carpal tunnel surg (Left, 10/31/2017); Colonoscopy (N/A, 3/30/2018); Colonoscopy (2018); pr njx aa&/strd tfrml epi lumbar/sacral 1 level (Bilateral, 2018); other surgical history (2018); pr njx aa&/strd tfrml epi lumbar/sacral 1 level (N/A, 2018); Pain management procedure (Left, 2020);  Pain management procedure (Left, 7/20/2020); Pain management procedure (Bilateral, 8/17/2020); other surgical history (Right, 11/23/2020); Nerve Block (Right, 11/23/2020); Pain management procedure (Bilateral, 12/7/2020); Upper gastrointestinal endoscopy (N/A, 12/29/2020); Upper gastrointestinal endoscopy (N/A, 2/2/2021); Upper gastrointestinal endoscopy (N/A, 2/12/2021); Upper gastrointestinal endoscopy (2/12/2021); [place] tooth extraction; IR PORT PLACEMENT > 5 YEARS (4/19/2021); Upper gastrointestinal endoscopy (N/A, 8/31/2021); Upper gastrointestinal endoscopy (N/A, 1/21/2022); Upper gastrointestinal endoscopy (N/A, 4/15/2022); and Colonoscopy (N/A, 4/16/2022). Assessment   Assessment: Pt completing functional mobility w/Mod I and ADL's with Mod I/set-up A 2* hospital environment only. Pt reports no concerns re: home discharge. No need for further skilled OT services at this time. Prognosis: Good  Decision Making: Low Complexity  No Skilled OT: Safe to return home; No OT goals identified  REQUIRES OT FOLLOW-UP: No  Activity Tolerance  Activity Tolerance: Patient Tolerated treatment well        Restrictions  Restrictions/Precautions  Restrictions/Precautions: General Precautions  Required Braces or Orthoses?: No  Implants present? :  (pt denies)  Position Activity Restriction  Other position/activity restrictions: up as tolerated    Subjective   General  Chart Reviewed: Yes,Orders,Progress Notes,Previous Admission  Patient assessed for rehabilitation services?: Yes  Additional Pertinent Hx: Pt was at SNF prior to admission but wishes to return home. Abdominal paracentesis completed yesterday with 11L removed. Family / Caregiver Present: No  Referring Practitioner: Dr. Iveth Infante  Diagnosis: SOB, Ascites  Subjective  Subjective: \"I am just killing time until they let me out of here\"  General Comment  Comments: Pt reports planned discharge home this date.   Pain: Pt denies pain at this time     Social/Functional History  Social/Functional History  Lives With: Alone  Type of Home: House  Home Layout: One level  Home Access: Stairs to enter with rails  Entrance Stairs - Number of Steps: 2  Entrance Stairs - Rails: Both (can reach both)  Bathroom Shower/Tub: Tub/Shower unit,Curtain  Bathroom Equipment: Grab bars in shower,Hand-held shower,Grab bars around toilet (shower chair is too big)  Bathroom Accessibility: Accessible  Home Equipment: Cane,Walker, rolling  Has the patient had two or more falls in the past year or any fall with injury in the past year?: No  ADL Assistance: [address] [place] [address] Avenue: Independent  Homemaking Responsibilities: Yes  Ambulation Assistance: Independent (has been using RW)  Transfer Assistance: Independent  Active : Yes  Mode of Transportation: Truck  Occupation: Retired  Type of Occupation:   IADL Comments: sleeps in regular flat bad  Additional Comments: Has been at SNF recently, planning to return home from SNF. Ex wife is available to assist - is assisting wtih dog. Objective   Pulse: 74  BP: 109/65  MAP (Calculated): 79.67  Resp: 14  SpO2: 97 %  Vision Exceptions: Wears glasses at all times  Hearing: Within functional limits          Safety Devices  Type of Devices: Gait belt;Left in chair;Call light within reach        AROM: Within functional limits  Strength: Within functional limits  Coordination: Within functional limits  Tone: Normal  Sensation: Intact  ADL  Feeding: Independent  Grooming: Modified independent   UE Bathing: Setup  LE Bathing: Setup  UE Dressing: Setup  LE Dressing: Setup  Toileting: Modified independent   Additional Comments: Pt reports that he is up to BR per self with no difficulty. Set-up A for bathing/dressing 2* hospital environment only. Pt donned shoes without difficulty while sitting EOB.      Activity Tolerance  Activity Tolerance: Patient tolerated treatment well  Bed mobility  Rolling to Right: Independent  Supine to Sit: Independent  Sit to Supine: Independent  Scooting: Independent  Comment: Flat bed  Transfers  Stand Step Transfers: Modified independent  Stand Pivot Transfers: Modified independent  Sit to stand: Modified independent  Stand to sit: Modified independent  Transfer Comments: w/RW, no LOB or safety concerns  Vision - Basic Assessment  Patient Visual Report: No visual complaint reported. Cognition  Overall Cognitive Status: WFL  Perception  Overall Perceptual Status: WFL     Sensation  Overall Sensation Status: WNL (pt denies)    Goals  Patient Goals   Patient goals : Pt planning to discharge home today with A as needed from ex-wife.     Therapy Time   Individual Concurrent Group Co-treatment   [facility] [address] [facility]         Time Out 1010         Minutes [facility]

## 2022-04-28 NOTE — PROGRESS NOTES
Physical Therapy  Facility/Department: [facility] MED SURG  Physical Therapy Initial Assessment    Name: Sharonda Quiroz  : 1960  MRN: [id number]  Date of Service: 2022    Discharge Recommendations:  Home with assist PRN   PT Equipment Recommendations  Equipment Needed: No  Other: Pt has RW, advised to use      Patient Diagnosis(es): The primary encounter diagnosis was Shortness of breath. A diagnosis of Other ascites was also pertinent to this visit. 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 ([address] Anxiety, Arthritis, Back pain, chronic, Lezama esophagus, BPH (benign prostatic hypertrophy), Cholelithiasis, Cirrhosis ([address] COVID-19, COVID-19 vaccine series completed, DDD (degenerative disc disease), lumbar, Depression, Esophageal cancer ([address] Esophageal varices ([address] Fatty liver, GERD (gastroesophageal reflux disease), Hep C w/o coma, chronic ([address] History of alcohol abuse, History of blood transfusion, History of colon polyps, History of tobacco abuse, Knik (hard of hearing), Hyperlipidemia, Hypertension, Port-A-Cath in place, Sciatica, Spinal stenosis, Stomach ulcer, Tubular adenoma of colon, Vitamin D deficiency, and Wears glasses. Past Surgical History:  has a past surgical history that includes Bunionectomy; Nasal septum surgery; other surgical history (16); Colonoscopy; Colonoscopy (10/05/2016); other surgical history (2016); other surgical history (2016); knee surgery (Left); Bunionectomy (Left); Endoscopy, colon, diagnostic; pr revise median n/carpal tunnel surg (Right, 2017); Carpal tunnel release (Right); pr revise median n/carpal tunnel surg (Left, 10/31/2017); Colonoscopy (N/A, 3/30/2018); Colonoscopy (2018); pr njx aa&/strd tfrml epi lumbar/sacral 1 level (Bilateral, 2018); other surgical history (2018); pr njx aa&/strd tfrml epi lumbar/sacral 1 level (N/A, 2018); Pain management procedure (Left, 2020);  Pain management procedure (Left, 7/20/2020); Pain management procedure (Bilateral, 8/17/2020); other surgical history (Right, 11/23/2020); Nerve Block (Right, 11/23/2020); Pain management procedure (Bilateral, 12/7/2020); Upper gastrointestinal endoscopy (N/A, 12/29/2020); Upper gastrointestinal endoscopy (N/A, 2/2/2021); Upper gastrointestinal endoscopy (N/A, 2/12/2021); Upper gastrointestinal endoscopy (2/12/2021); [place] tooth extraction; IR PORT PLACEMENT > 5 YEARS (4/19/2021); Upper gastrointestinal endoscopy (N/A, 8/31/2021); Upper gastrointestinal endoscopy (N/A, 1/21/2022); Upper gastrointestinal endoscopy (N/A, 4/15/2022); and Colonoscopy (N/A, 4/16/2022). Assessment   Assessment: Pt doing well, amb with RW safely. Pt will not need further skilled PT immediately after d/c. Treatment Diagnosis: Impaired functional mobility 2* shortness of breath  Specific Instructions for Next Treatment: HEP, gait, stairs  Therapy Prognosis: Good  Decision Making: Low Complexity  History: paracentesis 4/27/22 with 11L removed  Exam: ROM, MMT, bed mobility, transfers, amb, balancec  Clinical Presentation: Pt alert, cooperative, agreeable  Barriers to Learning: none  Requires PT Follow-Up: Yes  Activity Tolerance  Activity Tolerance: Patient tolerated treatment well     Plan   Plan  Plan: 3-5 times per week  Specific Instructions for Next Treatment: HEP, gait, stairs  Current Treatment Recommendations: Balance training,Functional mobility training,Endurance training,Gait training,Stair training,Strengthening,Safety education & training,Home exercise program  Safety Devices  Type of Devices:  All fall risk precautions in place,Call light within reach,Gait belt,Patient at risk for falls,Left in bed,Nurse notified (JERI Dia)     Restrictions  Restrictions/Precautions  Restrictions/Precautions: General Precautions  Required Braces or Orthoses?: No  Implants present? :  (pt denies)  Position Activity Restriction  Other position/activity restrictions: up as tolerated     Subjective   Pain: Pt denies pain at this time  General  Chart Reviewed: Yes  Patient assessed for rehabilitation services?: Yes  Additional Pertinent Hx: back pain, COVID 19  Family / Caregiver Present: No  Referring Practitioner: France Barber MD  Referral Date : 04/27/22  Diagnosis: shortness of breath  Follows Commands: Within Functional Limits  Subjective  Subjective: Pt in bed, agreeable to PT OT will. RN Kimani Guillen         Social/Functional History  Social/Functional History  Lives With: Alone  Type of Home: House  Home Layout: One level  Home Access: Stairs to enter with rails  Entrance Stairs - Number of Steps: 2  Entrance Stairs - Rails: Both (can reach both)  Bathroom Shower/Tub: Tub/Shower unit,Curtain  Bathroom Equipment: Grab bars in shower,Hand-held shower,Grab bars around toilet (shower chair is too big)  Bathroom Accessibility: Accessible  Home Equipment: Cane,Walker, rolling  Has the patient had two or more falls in the past year or any fall with injury in the past year?: No  ADL Assistance: [facility]: Independent  Homemaking Responsibilities: Yes  Ambulation Assistance: Independent (has been using RW)  Transfer Assistance: Independent  Active : Yes  Mode of Transportation: Truck  Occupation: Retired  Type of Occupation:   IADL Comments: sleeps in regular flat bad  Additional Comments: Has been at SNF recently, planning to return home from SNF. Ex wife is available to assist - is assisting wtih dog.    Vision/Hearing  Vision Exceptions: Wears glasses at all times  Hearing: Within functional limits    Cognition   Orientation  Overall Orientation Status: Within Normal Limits     Objective   Pulse: 74  BP: 109/65  MAP (Calculated): 79.67  Resp: 14  SpO2: 97 %              AROM RLE (degrees)  RLE AROM: WNL  AROM LLE (degrees)  LLE AROM : WNL  AROM RUE (degrees)  RUE General AROM: See OT  AROM LUE (degrees)  LUE General AROM: See OT  Strength RLE  Strength RLE: WNL  Comment: Grossly 4/5  Strength LLE  Strength LLE: WNL  Comment: Grossly 4/5  Strength RUE  Comment: See OT  Strength LUE  Comment: See OT  Motor Control  Gross Motor?: WNL  Sensation  Overall Sensation Status: WNL (pt denies)     Bed mobility  Rolling to Right: Independent  Supine to Sit: Independent  Sit to Supine: Independent  Scooting: Independent  Comment: Flat bed, no cues needed. No dizziness reported. Transfers  Sit to Stand: Modified independent  Stand to sit: Modified independent  Comment: MOD I with RW, no cues needed. Pt politely declines up to chair. Ambulation  Surface: level tile  Device: Rolling Walker  Assistance: Supervision  Quality of Gait: fast moving, forward flexed posture, narrower SALVATORE  Distance: 150'  Comments: Cues for RW safety prn. No signs of fatigue  Stairs/Curb  Stairs?: Yes  Stairs  # Steps : 3  Stairs Height: 4\"  Device: Rolling walker  Assistance: Contact guard assistance  Comment: Step to pattern with good technique. No LOB     Balance  Posture: Good  Sitting - Static: Good  Sitting - Dynamic: Good  Standing - Static: Good  Standing - Dynamic: Good;-;Fair;+  Comments: Standing balance with RW           OutComes Score                                                  AM-PAC Score  AM-PAC Inpatient Mobility Raw Score : 22 (04/28/22 0956)  AM-PAC Inpatient T-Scale Score : 53.28 (04/28/22 0956)  Mobility Inpatient CMS 0-100% Score: 20.91 (04/28/22 0956)  Mobility Inpatient CMS G-Code Modifier : CJ (04/28/22 5070)          Goals  Short Term Goals  Time Frame for Short term goals: 3 days  Short term goal 1: Pt to ' MOD I. Short term goal 2: Pt to ascend/descend 3 stairs SBA with 1 UE support. Short term goal 3: Pt to improve standing balance to GOOD to reduce fall risk. Short term goal 4: Pt to demo good technique for HEP  Patient Goals   Patient goals :  To go home       Therapy Time   Individual Concurrent Group Co-treatment   Time In [address]         Time Out 1010         Minutes [address] [place], [place]

## 2022-04-28 NOTE — CONSULTS
INITIAL CONSULTATION     HISTORY OF PRESENT ILLNESS: Juan Pablo Brandon is a 57 y.o. male admitted to the hospital on 4/26/2022 for shortness of breath and abdominal distention. He has cirrhosis with ascites. History of alcohol abuse. He has hepatitis C. He is on Harvoni. He has had multiple paracentesis in the past.  No prior SBP. Reports no significant abdominal pain. He has been taking iron supplementation. No fever or chills. Creatinine is normal.  Liver enzymes are mildly elevated. EGD couple weeks ago showed esophagitis likely due to radiation. Candida was noted. Colonoscopy showed small internal hemorrhoids and polyps.      PAST MEDICAL HISTORY:     Past Medical History:   Diagnosis Date    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 Jack Khan, orthopedic, every 3-4 months, gets steroid injection    Lezama esophagus     BPH (benign prostatic hypertrophy)     Cholelithiasis     Cirrhosis ([address]     COVID-19 12/2020    pt reports he had a positive test while at [facility] in 2020, was asymptomatic    COVID-19 vaccine series completed 5/20/2021, 6/22/2021    Moderna 5/20/2021, 6/22/2021    DDD (degenerative disc disease), lumbar     Depression     Esophageal cancer ([address]     INVASIVE ADENOCARCINOMA ARISING IN TUBULAR ADENOMA WITH HIGH GRADE DYSPLASIA, ASSOCIATED WITH FOCAL INTESTINAL METAPLASIA     Esophageal varices ([address]     Fatty liver     GERD (gastroesophageal reflux disease)     Hep C w/o coma, chronic ([address]     History of alcohol abuse     6-12 beers a day; quit drinking July 2016    History of blood transfusion     History of colon polyps 2016    History of tobacco abuse     [place]     Hyperlipidemia     Hypertension     Port-A-Cath in place     right upper chest    Sciatica     Spinal stenosis     Stomach ulcer     hx of    Tubular adenoma of colon 2016, 2018    Vitamin D deficiency     Wears glasses Past Surgical History:   Procedure Laterality Date    BUNIONECTOMY      twice on right side    BUNIONECTOMY Left     CARPAL TUNNEL RELEASE Right     COLONOSCOPY      at age 35    COLONOSCOPY  10/05/2016    polyps-pathology tubular adenoma, and abnormal looking mucosa right colon-pathology-tubular adenoma    COLONOSCOPY N/A 3/30/2018    COLONOSCOPY POLYPECTOMY COLD BIOPSY performed by Aylin Herndon MD at [address]  03/30/2018    Small polyp in the sigmoid colon and excised with biopsy forceps--tubular adenoma    COLONOSCOPY N/A 4/16/2022    COLONOSCOPY POLYPECTOMY performed by Aylin Herndon MD at [address], COLON, DIAGNOSTIC      EGD    IR PORT PLACEMENT EQUAL OR GREATER THAN 5 YEARS  4/19/2021    IR PORT PLACEMENT EQUAL OR GREATER THAN 5 YEARS 4/19/2021 [facility] SPECIAL PROCEDURES    KNEE SURGERY Left     cyst removed    NASAL SEPTUM SURGERY      NERVE BLOCK Right 11/23/2020    NERVE BLOCK RIGHT CERVICAL STEROID INJECTION  C3-C6 performed by Jamee Mcmillan MD at [address] [facility]  01/04/16    steroid injection C7 T1    OTHER SURGICAL HISTORY  11/21/2016    Bilateral Lumbar CACHORRO L4-L5 injections    OTHER SURGICAL HISTORY  12/19/2016    lumbar steroid injection    OTHER SURGICAL HISTORY  09/28/2018    BILATERAL L5 CACHORRO (N/A Back)    OTHER SURGICAL HISTORY Right 11/23/2020    cervical injection    PAIN MANAGEMENT PROCEDURE Left 7/9/2020    EPIDURAL STEROID INJECTION LEFT L4 L5 performed by Jamee Mcmillan MD at [address] Left 7/20/2020    LEFT L4 L5 EPIDURAL STEROID INJECTION performed by Jamee Mcmillan MD at [address] Bilateral 8/17/2020    LUMBAR FACET BILATERAL L2-L5 performed by Jamee Mcmillan MD at [address] Bilateral 12/7/2020    NERVE BLOCK BILATERAL LUMBAR MEDIAL BRANCH L2-L5 performed by Jamee Mcmillan MD at [address] AA&/STRD TFRML EPI LUMBAR/SACRAL 1 LEVEL Bilateral 9/6/2018    BILATERAL L5 CACHORRO performed by Peyman Zapata MD at [address] AA&/STRD TFRML EPI LUMBAR/SACRAL 1 LEVEL N/A 9/28/2018    BILATERAL L5 CACHORRO performed by Peyman Zapata MD at [address] [facility] N/CARPAL TUNNEL SURG Right 8/29/2017    CARPAL TUNNEL RELEASE RIGHT performed by Erica Campo MD at [address] [facility] N/CARPAL TUNNEL SURG Left 10/31/2017    CARPAL TUNNEL RELEASE performed by Erica Campo MD at [address] [place] [address] N/A 12/29/2020    EGD BIOPSY performed by Ana Ding MD at [address] [place] [address] N/A 2/2/2021    EGD BIOPSY and spot marking performed by Rahul Owen MD at [address] [place] [address] N/A 2/12/2021    ENDOSCOPIC ULTRASOUND, EGD performed by Alan Dinero MD at [address] [place] [address]  2/12/2021    EGD DIAGNOSTIC ONLY performed by Alan Dinero MD at [address] [place] [address] N/A 8/31/2021    EGD BIOPSY performed by Rahul Owen MD at [address] [place] [address] N/A 1/21/2022    EGD BIOPSY performed by Rahul Owen MD at [address] [place] [address] N/A 4/15/2022    EGD ESOPHAGOGASTRODUODENOSCOPY performed by Ana Ding MD at [address] [place] [address] Row:       Current Facility-Administered Medications:     acetaminophen (TYLENOL) tablet 650 mg, 650 mg, Oral, Q4H PRN, Winston Lamb MD    sodium chloride flush 0.9 % injection 5-40 mL, 5-40 mL, IntraVENous, 2 times per day, MASSIMO Calero CNP, 10 mL at 04/28/22 0815    sodium chloride flush 0.9 % injection 10 mL, 10 mL, IntraVENous, PRN, MASSIMO Calero - CNP    0.9 % sodium chloride infusion, , IntraVENous, PRN, MASSIMO Calero - CNP    ondansetron (ZOFRAN-ODT) disintegrating tablet 4 mg, 4 mg, Oral, Q8H PRN **OR** ondansetron (ZOFRAN) injection 4 mg, 4 mg, IntraVENous, Q6H PRN, Charles Chroman, APRN - CNP    magnesium hydroxide (MILK OF MAGNESIA) 400 MG/5ML suspension 30 mL, 30 mL, Oral, Daily PRN, [place] Chroman, APRN - CNP    atorvastatin (LIPITOR) tablet 20 mg, 20 mg, Oral, Nightly, [place] Chroman, APRN - CNP, 20 mg at 04/27/22 2130    cyclobenzaprine (FLEXERIL) tablet 10 mg, 10 mg, Oral, Daily PRN, Charles Chroman, APRN - CNP, 10 mg at 04/26/22 2253    ferrous sulfate (IRON 325) tablet 325 mg, 325 mg, Oral, BID, [place] Chroman, APRN - CNP, 325 mg at 04/28/22 0815    FLUoxetine (PROZAC) capsule 20 mg, 20 mg, Oral, Daily, Charles Chroman, APRN - CNP, 20 mg at 04/28/22 0815    furosemide (LASIX) tablet 20 mg, 20 mg, Oral, BID, Charles Chroman, APRN - CNP, 20 mg at 04/28/22 0815    lactulose (CHRONULAC) 10 GM/15ML solution 20 g, 20 g, Oral, TID, Charles Chroman, APRN - CNP, 20 g at 04/28/22 0816    midodrine (PROAMATINE) tablet 5 mg, 5 mg, Oral, TID PRN, [place] Chroman, APRN - CNP    nadolol (CORGARD) tablet 20 mg, 20 mg, Oral, Daily, [place] Chroman, APRN - CNP, 20 mg at 04/28/22 0816    pantoprazole (PROTONIX) tablet 40 mg, 40 mg, Oral, QAM AC, [place] Chroman, APRN - CNP, 40 mg at 04/28/22 0382    potassium chloride (KLOR-CON M) extended release tablet 20 mEq, 20 mEq, Oral, Daily, Charles Chroman, APRN - CNP, 20 mEq at 04/28/22 0815    lactobacillus (CULTURELLE) capsule 1 capsule, 1 capsule, Oral, BID, [place] Chroman, APRN - CNP, 1 capsule at 04/28/22 0815    rifAXIMin (XIFAXAN) tablet 550 mg, 550 mg, Oral, BID, [place] Chroman, APRN - CNP, 550 mg at 04/28/22 0815    spironolactone (ALDACTONE) tablet 100 mg, 100 mg, Oral, QAM, Charles Chroman, APRN - CNP, 100 mg at 04/28/22 0815    spironolactone (ALDACTONE) tablet 50 mg, 50 mg, Oral, QPM, [place] Chroman, APRN - CNP, 50 mg at 04/27/22 6169    oxyCODONE-acetaminophen (PERCOCET) 5-325 MG per tablet 1 tablet, 1 tablet, Oral, Daily PRN, Charles Chroman, APRN - CNP, 1 tablet at 04/26/22 4346       ALLERGIES:   Allergies Allergen Reactions    Bee Pollen Other (See Comments)     Runny nose, watery eyes    Pollen Extract      Runny nose, watery eyes          FAMILY HISTORY: The patient's family history was reviewed. SOCIAL HISTORY:   Social History     Socioeconomic History    Marital status: Single     Spouse name: Not on file    Number of children: Not on file    Years of education: Not on file    Highest education level: Not on file   Occupational History    Not on file   Tobacco Use    Smoking status: Former Smoker     Packs/day: 1.00     Years: 45.00     Pack years: 45.00     Quit date: 2017     Years since quittin.2    Smokeless tobacco: Never Used   Vaping Use    Vaping Use: Never used   Substance and Sexual Activity    Alcohol use: Not Currently     Comment: quit     Drug use: Not Currently     Frequency: 1.0 times per week     Comment: cocaine,  stopped spring 2016    Sexual activity: Yes     Partners: Female   Other Topics Concern    Not on file   Social History Narrative     in the past, retired     Social Determinants of Health     Financial Resource Strain:     Difficulty of Paying Living Expenses: Not on file   Food Insecurity:     Worried About [address] in the Last Year: Not on file    [address] in the Last Year: Not on file   Transportation Needs:     Lack of Transportation (Medical): Not on file    Lack of Transportation (Non-Medical):  Not on file   Physical Activity:     Days of Exercise per Week: Not on file    Minutes of Exercise per Session: Not on file   Stress:     Feeling of Stress : Not on file   Social Connections:     Frequency of Communication with Friends and Family: Not on file    Frequency of Social Gatherings with Friends and Family: Not on file    Attends Quaker Services: Not on file    Active Member of Clubs or Organizations: Not on file    Attends Club or Organization Meetings: Not on file    Marital Status: Not on file Intimate Partner Violence:     Fear of Current or Ex-Partner: Not on file    Emotionally Abused: Not on file    Physically Abused: Not on file    Sexually Abused: Not on file   Housing Stability:     Unable to Pay for Housing in the Last Year: Not on file    Number of Mic in the Last Year: Not on file    Unstable Housing in the Last Year: Not on file         REVIEW OF SYSTEMS: A 14-point review of systems was obtained and pertinent positives andnegatives were enumerated above in the history of present illness. All other reviewed systems / symptoms were negative. Review of Systems      PHYSICAL EXAMINATION: Vital signs reviewed per the nursing documentation. /65   Pulse 74   Temp 98.1 °F (36.7 °C)   Resp 14   Ht 5' 10\" (1.778 m)   Wt 231 lb 0.7 oz (104.8 kg)   SpO2 97%   BMI 33.15 kg/m²    [unfilled]   Body mass index is 33.15 kg/m². General:  A O x 3 in NAD   Psych: . Normal affect. Mentation normal   HEENT: PERRLA. Clear conjunctivae and sclerae. Moist oral mucosae, no lesions orulcers. The neck is supple, without lymphadenopathy or jugular venous distension. No masses. Normal thyroid. Cardiovascular: S1 S2 RRR no rubs or murmurs. Pulmonary: clear BL. No accessory muscle usage. Abd Exam: Soft, NT ND, no hepato or spleno megaly, +BS, ++ ascites. No groin masses or lymphadenopathy. Extremities: No edema. Skin: Warm skin. No skin rash. No spider nevi palmar erythema naildystrophy. Joint: No joint swelling or deformity. Neurological: intact sensory. DTR+.  No asterixis     LABORATORY DATA: Reviewed   Lab Results   Component Value Date    WBC 4.1 04/28/2022    HGB 7.1 (L) 04/28/2022    HCT 21.6 (L) 04/28/2022    MCV 91.3 04/28/2022     04/28/2022     (L) 04/28/2022    K 4.3 04/28/2022     04/28/2022    CO2 22 04/28/2022    BUN 9 04/28/2022    CREATININE 0.50 (L) 04/28/2022    LABPROT 7.7 04/19/2012    LABALBU 3.1 (L) 04/26/2022 BILITOT 2.84 (H) 04/26/2022    ALKPHOS 261 (H) 04/26/2022    AST 95 (H) 04/26/2022    ALT 66 (H) 04/26/2022    INR 1.2 04/26/2022      Lab Results   Component Value Date    RBC 2.37 (L) 04/28/2022    HGB 7.1 (L) 04/28/2022    MCV 91.3 04/28/2022    MCH 29.9 04/28/2022    MCHC 32.8 04/28/2022    RDW 24.6 (H) 04/28/2022    MPV 7.5 04/28/2022    BASOPCT 1 04/26/2022    LYMPHSABS 0.67 (L) 04/26/2022    MONOSABS 1.01 04/26/2022    NEUTROABS 6.39 04/26/2022    EOSABS 0.25 04/26/2022    BASOSABS 0.08 04/26/2022          DIAGNOSTIC TESTING:   US LIVER    Result Date: 4/19/2022  EXAMINATION: RIGHT UPPER QUADRANT ULTRASOUND 4/19/2022 9:24 am COMPARISON: None. HISTORY: ORDERING SYSTEM PROVIDED HISTORY: evaluate portal/hepatic vein, r/o PVT TECHNOLOGIST PROVIDED HISTORY: evaluate portal/hepatic vein, r/o PVT FINDINGS: LIVER:  Nodular margins of the liver would suggest hepatic cirrhosis. No focal lesion. Liver 15.6 cm in length. Hepatopetal flow portal vein. BILIARY SYSTEM:  Gallstones and sludge within the gallbladder. No wall thickening. Negative sonographic Silva's sign. Common bile duct is within normal limits measuring 4.6 mm. OTHER: Moderate ascites visualized right upper quadrant. 1. Hepatic cirrhosis. No focal mass. Hepatopetal flow portal vein. 2. Gallstones and sludge within the gallbladder. 3. Ascites RECOMMENDATIONS: Unavailable     XR CHEST PORTABLE    Result Date: 4/26/2022  EXAMINATION: ONE XRAY VIEW OF THE CHEST 4/26/2022 6:18 pm COMPARISON: 04/15/2022 HISTORY: ORDERING SYSTEM PROVIDED HISTORY: SOB TECHNOLOGIST PROVIDED HISTORY: SOB Reason for Exam: shortness of breath FINDINGS: Right-sided central venous catheter remains in place. The lungs are without acute focal process. There is no effusion or pneumothorax. The cardiomediastinal silhouette is stable. The osseous structures are stable. No acute process.      XR CHEST PORTABLE    Result Date: 4/15/2022  EXAMINATION: ONE XRAY VIEW OF THE CHEST 4/15/2022 7:59 am COMPARISON: 02/02/2022, 12/21/2021 HISTORY: ORDERING SYSTEM PROVIDED HISTORY: Dyspnea TECHNOLOGIST PROVIDED HISTORY: Dyspnea Reason for Exam: Dyspnea FINDINGS: Right chest port terminates in the superior vena cava. Mild pulmonary vascular congestion. No focal pulmonary opacities. Calcified granulomata and calcified mediastinal nodes from prior granulomatous infection. Cardiomegaly which is similar to 4 months prior. No acute bony findings. Mild pulmonary vascular congestion. Cardiomegaly. IR US GUIDED PARACENTESIS    Result Date: 4/27/2022  PROCEDURE: PARACENTESIS WITHOUT IMAGE GUIDANCE US ABDOMEN LIMITED 4/27/2022 HISTORY: ORDERING SYSTEM PROVIDED HISTORY: ascites TECHNOLOGIST PROVIDED HISTORY: ascites Alcoholic cirrhosis TECHNIQUE: Informed consent was obtained after a detailed explanation of the procedure including risks, benefits, and alternatives. Universal protocol was followed. A limited ultrasound of the abdomen was performed. The right upper abdomen was prepped and draped in sterile fashion and local anesthesia was achieved with lidocaine. A 5 Korean needle sheath was advanced into ascites and paracentesis was performed. The patient tolerated the procedure well. Albumin a 5 g IV was administered post procedure. FINDINGS: Limited ultrasound of the abdomen demonstrates ascites. A total of 11 L was removed. Significant reduction ascitic volume demonstrated on postprocedure US imaging. Successful therapeutic paracentesis. IR US GUIDED PARACENTESIS    Result Date: 4/18/2022  PROCEDURE: PARACENTESIS WITH IMAGE GUIDANCE US ABDOMEN LIMITED 4/18/2022 HISTORY: ORDERING SYSTEM PROVIDED HISTORY: worsening acites and SOB TECHNOLOGIST PROVIDED HISTORY: worsening acites and SOB Please remove no more than 5 L TECHNIQUE: Informed consent was obtained after a detailed explanation of the procedure including risks, benefits, and alternatives. Universal protocol was followed.   A limited ultrasound of the abdomen was performed and shows large amount of ascites. The right abdomen was prepped and draped in sterile fashion and local anesthesia was achieved with lidocaine. A 5 Danish needle sheath was advanced into ascites using realtime ultrasound guidance and paracentesis was performed. The patient tolerated the procedure well. EBL: None FINDINGS: Limited ultrasound of the abdomen demonstrates ascites. A total of 4.8 L of clear yellow fluid was removed. Successful ultrasound guided paracentesis. IR US GUIDED PARACENTESIS    Result Date: 4/14/2022  PROCEDURE: PARACENTESIS WITH IMAGE GUIDANCE US ABDOMEN LIMITED 4/14/2022 HISTORY: ORDERING SYSTEM PROVIDED HISTORY: ascites remove 5 liters only TECHNOLOGIST PROVIDED HISTORY: ascites remove 5 liters only TECHNIQUE: Informed consent was obtained after a detailed explanation of the procedure including risks, benefits, and alternatives. Universal protocol was followed. A limited ultrasound of the abdomen was performed and shows a moderate to large amount of ascites. The right abdomen was prepped and draped in sterile fashion and local anesthesia was achieved with lidocaine. A 5 Danish needle sheath was advanced into ascites using realtime ultrasound guidance and paracentesis was performed. The patient tolerated the procedure well. EBL: None FINDINGS: Limited ultrasound of the abdomen demonstrates ascites. A total of 5 L of slightly turbid yellow colored fluid was removed. Additional fluid remains on completion. Successful ultrasound-guided paracentesis.      IR US GUIDED PARACENTESIS    Result Date: 4/5/2022  PROCEDURE: IR US GUIDED PARACENTESIS W IMAGING 4/5/2022 HISTORY: ORDERING SYSTEM PROVIDED HISTORY: Ascites due to alcoholic cirrhosis (HCC) TECHNIQUE: Sonography was utilized CONTRAST: None SEDATION: None FLUOROSCOPY DOSE AND TYPE OR TIME AND EXPOSURES: None DESCRIPTION OF PROCEDURE: Informed consent was obtained after a detailed explanation of the procedure including risks, benefits, and alternatives. Universal protocol was observed. Preprocedure ultrasound shows a dominant fluid pocket in the right lowerquadrant. Using ultrasound guidance (image attached to the medical record), the fluid pocket was accessed with a 5 [place] Lorrie Yueh needle with aspiration of inch clear yellow fluid. Vacuum bottle paracentesis was performed and approximately 7.25 L was removed. the patient tolerated the procedure well and left the department in good condition. Dr. Everton Dangelo was present. Patient received IV albumin replacement. FINDINGS: 7.25 L drained     Successful ultrasound-guided therapeutic paracentesis     CT CHEST ABDOMEN PELVIS W CONTRAST    Result Date: 4/25/2022  EXAMINATION: CT OF THE CHEST, ABDOMEN, AND PELVIS WITH CONTRAST 4/25/2022 9:59 am TECHNIQUE: CT of the chest, abdomen and pelvis was performed with the administration of intravenous contrast. Multiplanar reformatted images are provided for review. Dose modulation, iterative reconstruction, and/or weight based adjustment of the mA/kV was utilized to reduce the radiation dose to as low as reasonably achievable. COMPARISON: 01/31/2022 HISTORY: ORDERING SYSTEM PROVIDED HISTORY: Esophageal adenocarcinoma ([facility] [address] TECHNOLOGIST PROVIDED HISTORY: follow up Reason for Exam: follow up esophageal cancer Additional signs and symptoms: pt sts increasing abdominal pain and shortness of breath. Sts bloating. FINDINGS: Chest: Mediastinum: The cardiac size is normal. Aortic and coronary artery calcifications. No significant mediastinal lymphadenopathy. .  Thyroid gland grossly normal in visualized portions. Patulous esophagus with intraluminal contrast.  Mild thickening of the esophagus most pronounced in the distal 3rd is noted. The appears to be a sliding hiatal hernia as well. Some periesophageal varices are noted similar to prior. Lungs/pleura: Calcified lingular nodule. No follow-up required. No evidence for metastatic nodules. There is mild centrilobular emphysema. Central airways unremarkable. Soft Tissues/Bones: Right-sided infusion port terminates in SVC. No acute bony abnormalities. No aggressive lytic or blastic lesions. No significant superficial soft tissue lesion. Abdomen/Pelvis: Organs: Nodular shrunken cirrhotic liver in keeping with history of cirrhosis. No focal liver lesion. No splenomegaly. Pancreas, adrenal glands, kidneys show no significant abnormalities. Cholelithiasis noted. GI/Bowel: Small sliding hiatal hernia is suggested. Small bowel loops show no acute process or focal lesions. Unremarkable colon. No evidence for bowel obstruction. Pelvis: Urinary bladder is unremarkable. Peritoneum/Retroperitoneum: Large volume ascites increased from prior. Periesophageal varices as noted above. Normal enhancement of portal and splenic vein. No evidence for thrombosis. 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 There is no significant lymphadenopathy. Bones/Soft Tissues: No acute bony abnormalities. Diffuse degenerative changes in the spine. No aggressive lytic or blastic lesions. No significant superficial soft tissue lesion. 1. No clear evidence for metastatic disease. 2. Mild diffuse thickening of the esophagus most pronounced in the lower portion where there also appears to be a small sliding hiatal hernia. Consistent with history of esophageal cancer. 3. Cirrhotic liver with a few periesophageal varices. 4. Large volume ascites increased from prior. 5. Cholelithiasis unchanged. IMPRESSION: Mr. Edinson Jeffrey is a 57 y.o. male with cirrhosis and ascites. Alcohol abuse. Hepatitis C on treatment. History of esophageal cancer. He had paracentesis yesterday. He is stable. H&H dropped slightly. Recheck H&H in the afternoon. If stable he can be discharged home with outpatient follow-up.       Thank you for allowing me to participate in the care of Mr. Kerry Mukherjee. For any further questions please do not hesitate to contact me.        MD Sandhya Felix Cleaves

## 2022-04-28 NOTE — PLAN OF CARE
Will recheck hgb at 1600 today and no further decrease in hgb pt can be sent home from a gi standpoint . MASSIMO Cooper - CNP

## 2022-04-29 ENCOUNTER — TELEPHONE (OUTPATIENT)
Dept: PRIMARY CARE CLINIC | Age: 62
End: 2022-04-29

## 2022-04-29 ENCOUNTER — TELEPHONE (OUTPATIENT)
Dept: GASTROENTEROLOGY | Age: 62
End: 2022-04-29

## 2022-04-29 NOTE — TELEPHONE ENCOUNTER
Mire[facility] Transitions Initial Follow Up Call    Outreach made within 2 business days of discharge: Yes    Patient: Dayanara Profit Patient : 1960   MRN: [id number]  Reason for Admission: There are no discharge diagnoses documented for the most recent discharge.   Discharge Date: 22       Spoke with: CHANTELLE to call office back     Discharge department/facility: [facility]    TCM Interactive Patient Contact:  LVM TO CALL OFFICE BACK        Follow Up  Future Appointments   Date Time Provider Carolyn English   5/3/2022  1:45 PM Gómez Lechuga MD [facility] system [facility]   2022  3:00 PM Kobe Wilson MD [facility] Martine Zamora   2022 10:30 AM Anny Villa MD [address] [place] [address]   2022 12:30 PM Kobe Wilson MD [facility]   2022  2:30 PM [facility] MED ONC CHAIR [facility] Fredy Quentin   6/10/2022 10:30 AM [address], APRN - CNP [facility]       [place]

## 2022-04-29 NOTE — TELEPHONE ENCOUNTER
----- Message from Jailene Toribio sent at 4/29/2022  8:42 AM EDT -----  Subject: Message to Provider    QUESTIONS  Information for Provider? Gomez Moore from [facility] wants to know if pt   has hosp f/u scheduled, when he was last scene, and will DR Jesus Mac follow   him for his home care services. Gomez Moore can be reached @ [phone] ext   [phone]  ---------------------------------------------------------------------------  --------------  CALL BACK INFO  What is the best way for the office to contact you? OK to leave message on   voicemail  Preferred Call Back Phone Number? [phone]  ---------------------------------------------------------------------------  --------------  SCRIPT ANSWERS  Relationship to Patient? Third Party  Third Party Type?  Other  Other Third Party Type? [facility]  Representative Name? Bhumika Rowan

## 2022-04-29 NOTE — TELEPHONE ENCOUNTER
Lvm informing Geoff Guo will follow. Lvm for pt to call for f/u appt from hospital/rehab facility discharge not sure which.

## 2022-04-29 NOTE — CARE COORDINATION
Continuity of Care Form    Patient Name: Ela Garcia   :  [birth year]  MRN:  [id number]    Admit date:  2022  Discharge date:  22    Code Status Order: Full Code   Advance Directives:      Admitting Physician:  Danielle Nevarez MD  PCP: VASU Gomez    Discharging Nurse:   [address] Unit/Room#: 2063/2063-01  Discharging Unit Phone Number:     Emergency Contact:   Extended Emergency Contact Information  Primary Emergency Contact: Tavon Dixon [address] Phone: [phone]  Work Phone: [phone]  Mobile Phone: [phone]  Relation: Other    Past Surgical History:  Past Surgical History:   Procedure Laterality Date    BUNIONECTOMY      twice on right side    BUNIONECTOMY Left     CARPAL TUNNEL RELEASE Right     COLONOSCOPY      at age 35    COLONOSCOPY  10/05/2016    polyps-pathology tubular adenoma, and abnormal looking mucosa right colon-pathology-tubular adenoma    COLONOSCOPY N/A 3/30/2018    COLONOSCOPY POLYPECTOMY COLD BIOPSY performed by Orlando Freeman MD at [facility]  2018    Small polyp in the sigmoid colon and excised with biopsy forceps--tubular adenoma    COLONOSCOPY N/A 2022    COLONOSCOPY POLYPECTOMY performed by Orlando Freeman MD at [address], COLON, DIAGNOSTIC      EGD    IR [facility] 15 5 YEARS  2021    IR PORT PLACEMENT EQUAL OR GREATER THAN 5 YEARS 2021 [address] PROCEDURES    KNEE SURGERY Left     cyst removed    NASAL SEPTUM SURGERY      NERVE BLOCK Right 2020    NERVE BLOCK RIGHT CERVICAL STEROID INJECTION  C3-C6 [facility]  [facility]    steroid injection C7 T1    OTHER SURGICAL HISTORY  2016    Bilateral Lumbar CACHORRO L4-L5 injections    OTHER SURGICAL HISTORY  2016    lumbar steroid injection    OTHER SURGICAL HISTORY  2018    BILATERAL L5 CACHORRO (N/A Back)    OTHER SURGICAL HISTORY Right 11/23/2020    cervical injection    PAIN MANAGEMENT PROCEDURE Left 7/9/2020    EPIDURAL STEROID INJECTION LEFT L4 L5 performed by Berenice Vinson MD at [facility] Left 7/20/2020    LEFT L4 L5 EPIDURAL STEROID INJECTION performed by Berenice Vinson MD at [facility] Bilateral 8/17/2020    LUMBAR FACET BILATERAL L2-L5 performed by Berenice Vinson MD at [facility] Bilateral 12/7/2020    NERVE BLOCK BILATERAL LUMBAR MEDIAL BRANCH L2-L5 performed by Berenice Vinson MD at [address] AA&/STRD TFRML EPI LUMBAR/SACRAL 1 LEVEL Bilateral 9/6/2018    BILATERAL L5 CACHORRO performed by Berenice Vinson MD at [address] AA&/STRD TFRML EPI LUMBAR/SACRAL 1 LEVEL N/A 9/28/2018    BILATERAL L5 CACHORRO performed by Berenice Vinson MD at [facility] N/CARPAL TUNNEL SURG Right 8/29/2017    CARPAL TUNNEL RELEASE RIGHT performed by Semaj Ramires MD at [facility] N/CARPAL TUNNEL SURG Left 10/31/2017    CARPAL TUNNEL RELEASE performed by Semaj Ramires MD at [facility] 12/29/2020    EGD BIOPSY performed by Mayank Zamorano MD at [place] [address] N/A 2/2/2021    EGD BIOPSY and spot marking performed by Marylou Lovelace MD at [place] [address] 2/12/2021    ENDOSCOPIC ULTRASOUND, EGD performed by Howard Holloway MD at [address] [place] [address]  2/12/2021    EGD DIAGNOSTIC ONLY performed by Howard Holloway MD at [address] [place] [address] N/A 8/31/2021    EGD BIOPSY performed by Marylou Lovelace MD at [place] [address] 1/21/2022    EGD BIOPSY performed by Marylou Lovelace MD at [place] [address] 4/15/2022    EGD ESOPHAGOGASTRODUODENOSCOPY performed by Mayank Zamorano MD at [address] [place] [address] EXTRACTION         Immunization History:   Immunization History   Administered Date(s) Administered    Hepatitis A 09/20/2016, 03/29/2017    Hepatitis B (Recombivax HB) 09/20/2016, 10/19/2016, 03/29/2017    Influenza 11/29/2012    Influenza, Jerone Cushing, IM, (6 mo and older Fluzone, Flulaval, Fluarix and 3 yrs and older Afluria) 09/13/2017, 01/24/2019    Influenza, Jerone Cushing, IM, PF (6 mo and older Fluzone, Flulaval, Fluarix, and 3 yrs and older Afluria) 09/13/2016, 12/30/2020, 12/23/2021    Pneumococcal Polysaccharide (Vivrashuc42) 11/29/2012, 07/25/2016    Tdap (Boostrix, Adacel) 04/06/2017       Active Problems:  Patient Active Problem List   Diagnosis Code    Back pain, chronic M54.9, G89.29    Hearing difficulty H91.90    GERD (gastroesophageal reflux disease) K21.9    Cervical radicular pain M54.12    Alcohol withdrawal syndrome without complication (HCC) N77.178    Hyponatremia E87.1    Hypomagnesemia E83.42    Chronic viral hepatitis B without delta agent and without coma (HCC) B18.1    Calculus of gallbladder without cholecystitis K80.20    Hep C w/o coma, chronic (HCC) B18.2    Fatty liver K76.0    Psychophysiologic insomnia F51.04    Cirrhosis (HCC) K74.60    Hepatic cirrhosis (HCC) K74.60    Vertebrogenic low back pain M54.51    DDD (degenerative disc disease), lumbar M51.36    Depression F32. A    Tubular adenoma of colon D12.6    History of colon polyps Z86.010    Gynecomastia, male N58    Lumbar radiculitis M54.16    Lumbar disc herniation M51.26    Tinnitus H93.19    Eustachian tube dysfunction H69.80    Ganglion cyst M67.40    Carpal tunnel syndrome of right wrist G56.01    History of hepatitis C Z86.19    Vitamin D deficiency E55.9    Pure hypercholesterolemia E78.00    Hypokalemia E87.6    Essential hypertension I10    Recurrent major depressive disorder in partial remission (HCC) F33.41    S/P epidural steroid injection Z92.241    Elevated LFTs R79.89    Seasonal allergies J30.2    Lumbar facet arthropathy M47.816    Cervical facet syndrome M47.812    Acute gastrointestinal bleeding K92.2    Thrombocytopenia (HCC) D69.6    Hepatitis C virus infection resolved after antiviral drug therapy Z86.19    Gastrointestinal hemorrhage with melena K92.1    Alcohol abuse F10.10    Altered mental status R41.82    Hypocalcemia E83.51    Hypophosphatemia E83.39    Malignant neoplasm of lower third of esophagus (HCC) C15.5    COVID-19 U07.1    Anxiety F41.9    Current smoker F17.200    Severe comorbid illness R69    Gait instability R26.81    Abnormal findings on diagnostic imaging of spine R93.7    Cervical spinal stenosis M48.02    Spinal stenosis of lumbar region with neurogenic claudication M48.062    Low hemoglobin D64.9    Symptomatic anemia, microcytic, acute D64.9    Hypotension I95.9    Former smoker, 50+ pack years, quit  Z87.891    HLD (hyperlipidemia) E78.5    Esophageal adenocarcinoma (HCC) C15.9    Anemia D64.9    Acute kidney injury ([address] N17.9    Ascites R18.8       Isolation/Infection:   Isolation            No Isolation          Patient Infection Status       Infection Onset Added Last Indicated Last Indicated By Review Planned Expiration Resolved Resolved By    None active    Resolved    COVID-19 (Rule Out) 22 COVID-19, Rapid (Ordered)   22 Rule-Out Test Resulted    COVID-19 (Rule Out) 22 COVID-19 & Influenza Combo (Ordered)   22 Rule-Out Test Resulted    COVID-19 21 COVID-19   21     COVID-19 (Rule Out) 21 COVID-19 (Ordered)   21 Rule-Out Test Resulted    COVID-19 (Rule Out) 20 COVID-19 (Ordered)   20 Rule-Out Test Resulted    COVID-19 (Rule Out) 20 COVID-19 (Ordered)   07/07/20 Rule-Out Test Resulted            Nurse Assessment:  Last Vital Signs: /67 Pulse 62   Temp 97.9 °F (36.6 °C) (Oral)   Resp 18   Ht 5' 10\" (1.778 m)   Wt 231 lb 0.7 oz (104.8 kg)   SpO2 100%   BMI 33.15 kg/m²     Last documented pain score (0-10 scale): Pain Level: 10  Last Weight:   Wt Readings from Last 1 Encounters:   04/26/22 231 lb 0.7 oz (104.8 kg)     Mental Status:  oriented and alert    IV Access:  - None    Nursing Mobility/ADLs:  Walking   Assisted  Transfer  Assisted  Bathing  Independent  Dressing  Independent  Toileting  Assisted  Feeding  Independent  Med Admin  Independent  Med Delivery   whole    Wound Care Documentation and Therapy:        Elimination:  Continence:   · Bowel: Yes  · Bladder: Yes  Urinary Catheter: None   Colostomy/Ileostomy/Ileal Conduit: No       Date of Last BM:     Intake/Output Summary (Last 24 hours) at 4/27/2022 1221  Last data filed at 4/27/2022 1002  Gross per 24 hour   Intake 970 ml   Output 35674 ml   Net -85582 ml     I/O last 3 completed shifts: In: 1 [P.O.:960; I.V.:10]  Out: -     Safety Concerns: At Risk for Falls    Impairments/Disabilities:      Vision    Nutrition Therapy:  Current Nutrition Therapy:   - Oral Diet:  General and Low Sodium (2gm)    Routes of Feeding: Oral  Liquids: No Restrictions  Daily Fluid Restriction: no  Last Modified Barium Swallow with Video (Video Swallowing Test): not done    Treatments at the Time of Hospital Discharge:   Respiratory Treatments:   Oxygen Therapy:  is not on home oxygen therapy. Ventilator:    - No ventilator support    Rehab Therapies: Physical Therapy and Occupational Therapy  Weight Bearing Status/Restrictions: No weight bearing restrictions  Other Medical Equipment (for information only, NOT a DME order):  cane and walker  Other Treatments: Skilled Nursing Assessment Per Protocol. Medication Education & Monitoring. LSW to assess for other community resources, including Home Meals.      Patient's personal belongings (please select all that are sent with patient):  Glasses, clothing    RN SIGNATURE:  Electronically signed by Marley Lopez RN on 4/28/22 at 5:11 PM EDT    CASE MANAGEMENT/SOCIAL WORK SECTION    Inpatient Status Date:     Readmission Risk Assessment Score:  Readmission Risk              Risk of Unplanned Readmission:  0           Discharging to Facility/ Agency   · [facility]  · [address]  · [place] [facility] [address]  · Phone [phone]  · Fax  [phone]     Dialysis Facility (if applicable)   · Name:  · Address:  · Dialysis Schedule:  · Phone:  · Fax:    / signature: Electronically signed by Trent Myrick RN on 4/27/22 at 1:32 PM EDT    PHYSICIAN SECTION    Prognosis: Good    Condition at Discharge: Stable    Rehab Potential (if transferring to Rehab): Good    Recommended Labs or Other Treatments After Discharge:     Physician Certification: I certify the above information and transfer of Walt Antunez  is necessary for the continuing treatment of the diagnosis listed and that he requires [address] for less 30 days.      Update Admission H&P: No change in H&P    PHYSICIAN SIGNATURE:  Electronically signed by Martin Phillip MD on 4/27/22 at 12:21 PM EDT    Medication List      CONTINUE taking these medications    CONTINUE taking these medications   atorvastatin 20 MG tablet  Commonly known as: LIPITOR  Take 1 tablet by mouth nightly   cyclobenzaprine 10 MG tablet  Commonly known as: FLEXERIL  Take 10 mg by mouth daily as needed for Muscle spasms   ferrous sulfate 325 (65 Fe) MG tablet  Commonly known as: IRON 325  Take 1 tablet by mouth 2 times daily   FLUoxetine 20 MG capsule  Commonly known as: PROZAC  Take 1 capsule by mouth daily   furosemide 40 MG tablet  Commonly known as: LASIX  Take 0.5 tablets by mouth 2 times daily   lactulose 10 GM/15ML solution  Commonly known as: CHRONULAC  Take 30 mLs by mouth 3 times daily   lidocaine-prilocaine 2.5-2.5 % cream  Commonly known as: EMLA  Apply topically 45-60 mins prior to needle poke daily PRN   midodrine 5 MG tablet  Commonly known as: PROAMATINE  Take 1 tablet by mouth 3 times daily as needed (SBP <110)   nadolol 20 MG tablet  Commonly known as: Corgard  Take 1 tablet by mouth daily   omeprazole 40 MG delayed release capsule  Commonly known as: PRILOSEC  take 1 capsule by mouth EVERY MORNING BEFORE BREAKFAST   ondansetron 4 MG disintegrating tablet  Commonly known as: ZOFRAN-ODT  Take 1 tablet by mouth every 8 hours as needed for Nausea or Vomiting   oxyCODONE-acetaminophen 5-325 MG per tablet  Commonly known as: PERCOCET  Take 1 tablet by mouth daily as needed for Pain. Probiotic Acidophilus Tabs  Take 1 tablet by mouth 2 times daily   rifAXIMin 550 MG tablet  Commonly known as: XIFAXAN  Take 1 tablet by mouth 2 times daily   * spironolactone 100 MG tablet  Commonly known as: ALDACTONE  Take 1 tablet by mouth every morning   * spironolactone 50 MG tablet  Commonly known as: ALDACTONE  Take 1 tablet by mouth every evening    (very important)  * This list has 2 medication(s) that are the same as other medications prescribed for you. Read the directions carefully, and ask your doctor or other care provider to review them with you.        STOP taking these medications    STOP taking these medications   potassium chloride 20 MEQ extended release tablet  Commonly known as: [address] Report    Report Name Print   Discharge Meds Print

## 2022-04-29 NOTE — TELEPHONE ENCOUNTER
Patient called and stated that he wanted to be seen before may 4th he is getting lumbar injections on may 6th and he would like a paracentesis.  Please call patient back

## 2022-05-02 ENCOUNTER — OFFICE VISIT (OUTPATIENT)
Dept: PRIMARY CARE CLINIC | Age: 62
End: 2022-05-02
Payer: MEDICARE

## 2022-05-02 VITALS
BODY MASS INDEX: 29.41 KG/M2 | WEIGHT: 205.4 LBS | HEART RATE: 99 BPM | HEIGHT: 70 IN | SYSTOLIC BLOOD PRESSURE: 122 MMHG | DIASTOLIC BLOOD PRESSURE: 70 MMHG | OXYGEN SATURATION: 99 %

## 2022-05-02 DIAGNOSIS — Z09 HOSPITAL DISCHARGE FOLLOW-UP: ICD-10-CM

## 2022-05-02 DIAGNOSIS — K70.31 ALCOHOLIC CIRRHOSIS OF LIVER WITH ASCITES (HCC): Chronic | ICD-10-CM

## 2022-05-02 DIAGNOSIS — D64.9 ANEMIA, UNSPECIFIED TYPE: Primary | ICD-10-CM

## 2022-05-02 DIAGNOSIS — D69.6 THROMBOCYTOPENIA (HCC): ICD-10-CM

## 2022-05-02 DIAGNOSIS — B18.1 CHRONIC VIRAL HEPATITIS B WITHOUT DELTA AGENT AND WITHOUT COMA (HCC): ICD-10-CM

## 2022-05-02 DIAGNOSIS — C15.5 MALIGNANT NEOPLASM OF LOWER THIRD OF ESOPHAGUS (HCC): ICD-10-CM

## 2022-05-02 PROCEDURE — 1111F DSCHRG MED/CURRENT MED MERGE: CPT | Performed by: PHYSICIAN ASSISTANT

## 2022-05-02 PROCEDURE — 99496 TRANSJ CARE MGMT HIGH F2F 7D: CPT | Performed by: PHYSICIAN ASSISTANT

## 2022-05-02 SDOH — ECONOMIC STABILITY: FOOD INSECURITY: WITHIN THE PAST 12 MONTHS, YOU WORRIED THAT YOUR FOOD WOULD RUN OUT BEFORE YOU GOT MONEY TO BUY MORE.: NEVER TRUE

## 2022-05-02 SDOH — ECONOMIC STABILITY: FOOD INSECURITY: WITHIN THE PAST 12 MONTHS, THE FOOD YOU BOUGHT JUST DIDN'T LAST AND YOU DIDN'T HAVE MONEY TO GET MORE.: NEVER TRUE

## 2022-05-02 ASSESSMENT — PATIENT HEALTH QUESTIONNAIRE - PHQ9
5. POOR APPETITE OR OVEREATING: 0
SUM OF ALL RESPONSES TO PHQ QUESTIONS 1-9: 0
3. TROUBLE FALLING OR STAYING ASLEEP: 0
2. FEELING DOWN, DEPRESSED OR HOPELESS: 0
1. LITTLE INTEREST OR PLEASURE IN DOING THINGS: 0
6. FEELING BAD ABOUT YOURSELF - OR THAT YOU ARE A FAILURE OR HAVE LET YOURSELF OR YOUR FAMILY DOWN: 0
10. IF YOU CHECKED OFF ANY PROBLEMS, HOW DIFFICULT HAVE THESE PROBLEMS MADE IT FOR YOU TO DO YOUR WORK, TAKE CARE OF THINGS AT HOME, OR GET ALONG WITH OTHER PEOPLE: 0
7. TROUBLE CONCENTRATING ON THINGS, SUCH AS READING THE NEWSPAPER OR WATCHING TELEVISION: 0
9. THOUGHTS THAT YOU WOULD BE BETTER OFF DEAD, OR OF HURTING YOURSELF: 0
SUM OF ALL RESPONSES TO PHQ QUESTIONS 1-9: 0
4. FEELING TIRED OR HAVING LITTLE ENERGY: 0
SUM OF ALL RESPONSES TO PHQ9 QUESTIONS 1 & 2: 0
SUM OF ALL RESPONSES TO PHQ QUESTIONS 1-9: 0
8. MOVING OR SPEAKING SO SLOWLY THAT OTHER PEOPLE COULD HAVE NOTICED. OR THE OPPOSITE, BEING SO FIGETY OR RESTLESS THAT YOU HAVE BEEN MOVING AROUND A LOT MORE THAN USUAL: 0
SUM OF ALL RESPONSES TO PHQ QUESTIONS 1-9: 0

## 2022-05-02 ASSESSMENT — SOCIAL DETERMINANTS OF HEALTH (SDOH): HOW HARD IS IT FOR YOU TO PAY FOR THE VERY BASICS LIKE FOOD, HOUSING, MEDICAL CARE, AND HEATING?: NOT HARD AT ALL

## 2022-05-02 NOTE — PROGRESS NOTES
Post-Discharge Transitional Care  Follow Up      Petty Casarez   YOB: 1960    Date of Office Visit:  5/2/2022  Date of Hospital Admission: 4/26/22  Date of Hospital Discharge: 4/28/22  Risk of hospital readmission (high >=14%. Medium >=10%) :Readmission Risk Score: 22.5 ( )      Care management risk score Rising risk (score 2-5) and Complex Care (Scores >=6): 4     Non face to face  following discharge, date last encounter closed (first attempt may have been earlier): 4/29/2022  2:51 PM    Call initiated 2 business days of discharge: Yes    ASSESSMENT/PLAN:   Anemia, unspecified type  -     Hemoglobin and Hematocrit; Standing  -     [place] DISCHARGE MEDS RECONCILED [address] [facility] discharge follow-up  -     [place] DISCHARGE MEDS RECONCILED W/ CURRENT OUTPATIENT MED LIST  Alcoholic cirrhosis of liver with ascites ([facility]  Chronic viral hepatitis B without delta agent and without coma (HCC)  Malignant neoplasm of lower third of esophagus (HCC)  Thrombocytopenia (HCC)      Medical Decision Making: High complexity. Return in 1 month (on 6/2/2022). Patient is giving a standing order for hemoglobin and hematocrit every week 2 times. Following up with gastro for hepatitis and hepatic cirrhosis. Patient will need regular paracentesis. Patient may also need routine transfusions. We will check hemoglobin and hematocrit and when dropping below 7 we will schedule for transfusion. This will be done until source of bleeding is identified. Subjective:   HPI:  Follow up of Hospital problems/diagnosis(es): Went in for a shot on back but could not breath due to acitis. Was found to have very low hemoglobin. Needs blood transfusions. No signs of bleeding found. Had three paracentesis. Sees Dr. Holden Aldridge at 1:45. Still have not found bleeding. Inpatient course: Discharge summary reviewed- see chart. Hospital to rehab to hospital to home.      Interval history/Current status: Still having fluid building quickly no confusion, having nurse coming once a week.        Patient Active Problem List   Diagnosis    Back pain, chronic    Hearing difficulty    GERD (gastroesophageal reflux disease)    Cervical radicular pain    Alcohol withdrawal syndrome without complication (HCC)    Hyponatremia    Hypomagnesemia    Chronic viral hepatitis B without delta agent and without coma (HCC)    Calculus of gallbladder without cholecystitis    Hep C w/o coma, chronic (HCC)    Fatty liver    Psychophysiologic insomnia    Cirrhosis ([address]    Hepatic cirrhosis ([address]    Vertebrogenic low back pain    DDD (degenerative disc disease), lumbar    Depression    Tubular adenoma of colon    History of colon polyps    Gynecomastia, male    Lumbar radiculitis    Lumbar disc herniation    Tinnitus    Eustachian tube dysfunction    Ganglion cyst    Carpal tunnel syndrome of right wrist    History of hepatitis C    Vitamin D deficiency    Pure hypercholesterolemia    Hypokalemia    Essential hypertension    Recurrent major depressive disorder in partial remission (HCC)    S/P epidural steroid injection    Elevated LFTs    Seasonal allergies    Lumbar facet arthropathy    Cervical facet syndrome    Acute gastrointestinal bleeding    Thrombocytopenia (HCC)    Hepatitis C virus infection resolved after antiviral drug therapy    Gastrointestinal hemorrhage with melena    Alcohol abuse    Altered mental status    Hypocalcemia    Hypophosphatemia    Malignant neoplasm of lower third of esophagus (HCC)    COVID-19    Anxiety    Current smoker    Severe comorbid illness    Gait instability    Abnormal findings on diagnostic imaging of spine    Cervical spinal stenosis    Spinal stenosis of lumbar region with neurogenic claudication    Low hemoglobin    Symptomatic anemia, microcytic, acute    Hypotension    Former smoker, 50+ pack years, quit 2016    HLD (hyperlipidemia)    Esophageal adenocarcinoma (HCC)    Anemia    Acute kidney [facility]    Ascites    Shortness of breath       Medications listed as ordered at the time of discharge from hospital     Medication List          Accurate as of May 2, 2022  3:28 PM. If you have any questions, ask your nurse or doctor.             CONTINUE taking these medications    atorvastatin 20 MG tablet  Commonly known as: LIPITOR  Take 1 tablet by mouth nightly     cyclobenzaprine 10 MG tablet  Commonly known as: FLEXERIL     ferrous sulfate 325 (65 Fe) MG tablet  Commonly known as: IRON 325  Take 1 tablet by mouth 2 times daily     FLUoxetine 20 MG capsule  Commonly known as: PROZAC  Take 1 capsule by mouth daily     furosemide 40 MG tablet  Commonly known as: LASIX  Take 0.5 tablets by mouth 2 times daily     lactulose 10 GM/15ML solution  Commonly known as: CHRONULAC  Take 30 mLs by mouth 3 times daily     lidocaine-prilocaine 2.5-2.5 % cream  Commonly known as: EMLA  Apply topically 45-60 mins prior to needle poke daily PRN     midodrine 5 MG tablet  Commonly known as: PROAMATINE  Take 1 tablet by mouth 3 times daily as needed (SBP <110)     nadolol 20 MG tablet  Commonly known as: Corgard  Take 1 tablet by mouth daily     omeprazole 40 MG delayed release capsule  Commonly known as: PRILOSEC  take 1 capsule by mouth EVERY MORNING BEFORE BREAKFAST     ondansetron 4 MG disintegrating tablet  Commonly known as: ZOFRAN-ODT  Take 1 tablet by mouth every 8 hours as needed for Nausea or Vomiting     oxyCODONE-acetaminophen 5-325 MG per tablet  Commonly known as: PERCOCET     Probiotic Acidophilus Tabs  Take 1 tablet by mouth 2 times daily     rifAXIMin 550 MG tablet  Commonly known as: XIFAXAN  Take 1 tablet by mouth 2 times daily     * spironolactone 100 MG tablet  Commonly known as: ALDACTONE  Take 1 tablet by mouth every morning     * spironolactone 50 MG tablet  Commonly known as: ALDACTONE  Take 1 tablet by mouth every evening         * This list has 2 medication(s) that are the same as other medications prescribed for you. Read the directions carefully, and ask your doctor or other care provider to review them with you. Medications marked \"taking\" at this time  Outpatient Medications Marked as Taking for the 5/2/22 encounter (Office Visit) with VASU Olmstead   Medication Sig Dispense Refill    FLUoxetine (PROZAC) 20 MG capsule Take 1 capsule by mouth daily 30 capsule 3    ondansetron (ZOFRAN-ODT) 4 MG disintegrating tablet Take 1 tablet by mouth every 8 hours as needed for Nausea or Vomiting 10 tablet 0    atorvastatin (LIPITOR) 20 MG tablet Take 1 tablet by mouth nightly 30 tablet 3    furosemide (LASIX) 40 MG tablet Take 0.5 tablets by mouth 2 times daily 60 tablet 3    spironolactone (ALDACTONE) 100 MG tablet Take 1 tablet by mouth every morning 30 tablet 3    spironolactone (ALDACTONE) 50 MG tablet Take 1 tablet by mouth every evening 30 tablet 3    Probiotic Acidophilus (FLORANEX) TABS Take 1 tablet by mouth 2 times daily 60 tablet 0    lactulose (CHRONULAC) 10 GM/15ML solution Take 30 mLs by mouth 3 times daily 473 mL 1    ferrous sulfate (IRON 325) 325 (65 Fe) MG tablet Take 1 tablet by mouth 2 times daily 60 tablet 5    omeprazole (PRILOSEC) 40 MG delayed release capsule take 1 capsule by mouth EVERY MORNING BEFORE BREAKFAST 30 capsule 2        Medications patient taking as of now reconciled against medications ordered at time of hospital discharge: Yes    Taking spironolactone 100 mg daily and 50 mg in the evening. Was in a rehab facility and then came home. A comprehensive review of systems was negative except for what was noted in the HPI.     Objective:    /70   Pulse 99   Ht 5' 10\" (1.778 m)   Wt 205 lb 6.4 oz (93.2 kg)   SpO2 99%   BMI 29.47 kg/m²   General Appearance: alert and oriented to person, place and time, well developed and well- nourished, in no acute distress  Skin: warm and dry, no rash or erythema  Head: normocephalic and atraumatic  Eyes: pupils equal, round, and reactive to light, extraocular eye movements intact, conjunctivae normal  ENT: tympanic membrane, external ear and ear canal normal bilaterally, nose without deformity, nasal mucosa and turbinates normal without polyps  Neck: supple and non-tender without mass, no thyromegaly or thyroid nodules, no cervical lymphadenopathy  Pulmonary/Chest: clear to auscultation bilaterally- no wheezes, rales or rhonchi, normal air movement, no respiratory distress  Cardiovascular: normal rate, regular rhythm, normal S1 and S2, no murmurs, rubs, clicks, or gallops, distal pulses intact, no carotid bruits  Abdomen: soft, non-tender, non-distended, normal bowel sounds, no masses or organomegaly  Extremities: no cyanosis, clubbing or edema  Musculoskeletal: normal range of motion, no joint swelling, deformity or tenderness  Neurologic: reflexes normal and symmetric, no cranial nerve deficit, gait, coordination and speech normal      An electronic signature was used to authenticate this note.   --VASU Chambers

## 2022-05-03 ENCOUNTER — OFFICE VISIT (OUTPATIENT)
Dept: GASTROENTEROLOGY | Age: 62
End: 2022-05-03
Payer: MEDICARE

## 2022-05-03 VITALS
HEIGHT: 70 IN | OXYGEN SATURATION: 97 % | BODY MASS INDEX: 29.81 KG/M2 | WEIGHT: 208.2 LBS | SYSTOLIC BLOOD PRESSURE: 136 MMHG | DIASTOLIC BLOOD PRESSURE: 87 MMHG | HEART RATE: 101 BPM

## 2022-05-03 DIAGNOSIS — C80.1 ADENOCARCINOMA IN A POLYP (HCC): ICD-10-CM

## 2022-05-03 DIAGNOSIS — K76.9 LIVER DISEASE, CHRONIC: Primary | ICD-10-CM

## 2022-05-03 DIAGNOSIS — K22.711 BARRETT'S ESOPHAGUS WITH HIGH GRADE DYSPLASIA: ICD-10-CM

## 2022-05-03 DIAGNOSIS — K70.31 ASCITES DUE TO ALCOHOLIC CIRRHOSIS (HCC): ICD-10-CM

## 2022-05-03 LAB
CULTURE: ABNORMAL
DIRECT EXAM: ABNORMAL
SPECIMEN DESCRIPTION: ABNORMAL

## 2022-05-03 PROCEDURE — 99213 OFFICE O/P EST LOW 20 MIN: CPT | Performed by: INTERNAL MEDICINE

## 2022-05-03 ASSESSMENT — ENCOUNTER SYMPTOMS
ABDOMINAL DISTENTION: 1
BLOOD IN STOOL: 0
RESPIRATORY NEGATIVE: 1
TROUBLE SWALLOWING: 0
RECTAL PAIN: 0
ANAL BLEEDING: 0
ABDOMINAL PAIN: 1
SORE THROAT: 0
COUGH: 0
VOMITING: 0
VOICE CHANGE: 0
NAUSEA: 0
BACK PAIN: 0
DIARRHEA: 0
SHORTNESS OF BREATH: 0
CONSTIPATION: 0
ALLERGIC/IMMUNOLOGIC NEGATIVE: 1
CHOKING: 0

## 2022-05-03 NOTE — PROGRESS NOTES
GI OFFICE FOLLOW UP    INTERVAL HISTORY:   No referring provider defined for this encounter. Chief Complaint   Patient presents with    Cirrhosis     Patient is here today to f/u on paracentesis        No diagnosis found. HISTORY OF PRESENT ILLNESS:   Patient seen along with his wife. This patient has ascites. He had abdominal paracentesis done only few days ago. I had a long discussion with the patient and wife regarding salt restriction. Still patient has significant ascites. Labs that were done on 4/28/2022 reviewed. At present patient appears stable. Denies abdominal pain. Has abdominal distention. No shortness of breath. No nausea vomiting. Bowel movements satisfactory. He has a history of dark stools no significant change in the color of stools recently. May be secondary to chronic iron therapy. Other medications reviewed. Past Medical,Family, and Social History reviewed and does contribute to the patient presenting condition. Patient's PMH/PSH,SH,PSYCH Hx, MEDs, ALLERGIES, and ROS were all reviewed and updated in the appropriate sections.  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 Wendi Molina, orthopedic, every 3-4 months, gets steroid injection    Lezama esophagus     BPH (benign prostatic hypertrophy)     Cholelithiasis     Cirrhosis ([address]     COVID-19 12/2020    pt reports he had a positive test while at [facility] in 2020, was asymptomatic    COVID-19 vaccine series completed 5/20/2021, 6/22/2021    Moderna 5/20/2021, 6/22/2021    DDD (degenerative disc disease), lumbar     Depression     Esophageal cancer (HCC)     INVASIVE ADENOCARCINOMA ARISING IN TUBULAR ADENOMA WITH HIGH GRADE DYSPLASIA, ASSOCIATED WITH FOCAL INTESTINAL METAPLASIA     Esophageal varices (HCC)     Fatty liver     GERD (gastroesophageal reflux disease)     Hep C w/o coma, chronic (HCC)     History of alcohol abuse     6-12 beers a day; quit drinking July 2016    History of blood transfusion     History of colon polyps 2016    History of tobacco abuse     [place] (hard of hearing)     Hyperlipidemia     Hypertension     Port-A-Cath in place     right upper chest    Sciatica     Spinal stenosis     Stomach ulcer     hx of    Tubular adenoma of colon 2016, 2018    Vitamin D deficiency     Wears glasses        Past Surgical History:   Procedure Laterality Date    BUNIONECTOMY      twice on right side    BUNIONECTOMY Left     CARPAL TUNNEL RELEASE Right     COLONOSCOPY      at age 35    COLONOSCOPY  10/05/2016    polyps-pathology tubular adenoma, and abnormal looking mucosa right colon-pathology-tubular adenoma    COLONOSCOPY N/A 3/30/2018    COLONOSCOPY POLYPECTOMY COLD BIOPSY performed by Jayden Del Valle MD at [address] [place] [address]  03/30/2018    Small polyp in the sigmoid colon and excised with biopsy forceps--tubular adenoma    COLONOSCOPY N/A 4/16/2022    COLONOSCOPY POLYPECTOMY performed by Jayden Del Valle MD at [address] [place] [address], DIAGNOSTIC      EGD    IR PORT PLACEMENT EQUAL OR GREATER THAN 5 YEARS  4/19/2021    IR PORT PLACEMENT EQUAL OR GREATER THAN 5 YEARS 4/19/2021 [facility] SPECIAL PROCEDURES    KNEE SURGERY Left     cyst removed    NASAL SEPTUM SURGERY      NERVE BLOCK Right 11/23/2020    NERVE BLOCK RIGHT CERVICAL STEROID INJECTION  C3-C6 performed by Avinash Bazan MD at [facility]  01/04/16    steroid injection C7 T1    OTHER SURGICAL HISTORY  11/21/2016    Bilateral Lumbar CACHORRO L4-L5 injections    OTHER SURGICAL HISTORY  12/19/2016    lumbar steroid injection    OTHER SURGICAL HISTORY  09/28/2018    BILATERAL L5 CACHORRO (N/A Back)    OTHER SURGICAL HISTORY Right 11/23/2020 cervical injection    PAIN MANAGEMENT PROCEDURE Left 7/9/2020    EPIDURAL STEROID INJECTION LEFT L4 L5 performed by Kobe Wilson MD at [facility] Left 7/20/2020    LEFT L4 L5 EPIDURAL STEROID INJECTION performed by Kobe Wilson MD at [facility] Bilateral 8/17/2020    LUMBAR FACET BILATERAL L2-L5 performed by Kobe Wilson MD at [facility] Bilateral 12/7/2020    NERVE BLOCK BILATERAL LUMBAR MEDIAL BRANCH L2-L5 performed by Kobe Wilson MD at [address] AA&/STRD TFRML EPI LUMBAR/SACRAL 1 LEVEL Bilateral 9/6/2018    BILATERAL L5 CACHORRO performed by Kobe Wilson MD at [address] AA&/STRD TFRML EPI LUMBAR/SACRAL 1 LEVEL N/A 9/28/2018    BILATERAL L5 CACHORRO performed by Kobe Wilson MD at [facility] N/CARPAL TUNNEL SURG Right 8/29/2017    CARPAL TUNNEL RELEASE RIGHT performed by Michi Cho MD at [facility] N/CARPAL TUNNEL SURG Left 10/31/2017    CARPAL TUNNEL RELEASE performed by Michi Cho MD at [facility] 12/29/2020    EGD BIOPSY performed by Aditi Navarro MD at [place] [address] N/A 2/2/2021    EGD BIOPSY and spot marking performed by Gómez Lechuga MD at [place] [address] 2/12/2021    ENDOSCOPIC ULTRASOUND, EGD performed by Sharee Harkins MD at [address] [place] [address]  2/12/2021    EGD DIAGNOSTIC ONLY performed by Sharee Harkins MD at [address] [place] [address] N/A 8/31/2021    EGD BIOPSY performed by Gómez Lechuga MD at [place] [address] 1/21/2022    EGD BIOPSY performed by Gómez Lechuga MD at [place] [address] 4/15/2022    EGD ESOPHAGOGASTRODUODENOSCOPY performed by Aditi Navarro MD at [address] [place] [address] CURRENT MEDICATIONS:    Current Outpatient Medications:     FLUoxetine (PROZAC) 20 MG capsule, Take 1 capsule by mouth daily, Disp: 30 capsule, Rfl: 3    ondansetron (ZOFRAN-ODT) 4 MG disintegrating tablet, Take 1 tablet by mouth every 8 hours as needed for Nausea or Vomiting, Disp: 10 tablet, Rfl: 0    atorvastatin (LIPITOR) 20 MG tablet, Take 1 tablet by mouth nightly, Disp: 30 tablet, Rfl: 3    furosemide (LASIX) 40 MG tablet, Take 0.5 tablets by mouth 2 times daily, Disp: 60 tablet, Rfl: 3    spironolactone (ALDACTONE) 100 MG tablet, Take 1 tablet by mouth every morning, Disp: 30 tablet, Rfl: 3    spironolactone (ALDACTONE) 50 MG tablet, Take 1 tablet by mouth every evening, Disp: 30 tablet, Rfl: 3    midodrine (PROAMATINE) 5 MG tablet, Take 1 tablet by mouth 3 times daily as needed (SBP <110), Disp: 90 tablet, Rfl: 3    Probiotic Acidophilus (FLORANEX) TABS, Take 1 tablet by mouth 2 times daily, Disp: 60 tablet, Rfl: 0    lactulose (CHRONULAC) 10 GM/15ML solution, Take 30 mLs by mouth 3 times daily, Disp: 473 mL, Rfl: 1    nadolol (CORGARD) 20 MG tablet, Take 1 tablet by mouth daily, Disp: 30 tablet, Rfl: 3    ferrous sulfate (IRON 325) 325 (65 Fe) MG tablet, Take 1 tablet by mouth 2 times daily, Disp: 60 tablet, Rfl: 5    oxyCODONE-acetaminophen (PERCOCET) 5-325 MG per tablet, Take 1 tablet by mouth daily as needed for Pain.  (Patient not taking: Reported on 5/2/2022), Disp: , Rfl:     rifAXIMin (XIFAXAN) 550 MG tablet, Take 1 tablet by mouth 2 times daily (Patient not taking: Reported on 5/2/2022), Disp: 42 tablet, Rfl: 1    cyclobenzaprine (FLEXERIL) 10 MG tablet, Take 10 mg by mouth daily as needed for Muscle spasms (Patient not taking: Reported on 5/2/2022), Disp: , Rfl:     omeprazole (PRILOSEC) 40 MG delayed release capsule, take 1 capsule by mouth EVERY MORNING BEFORE BREAKFAST, Disp: 30 capsule, Rfl: 2    lidocaine-prilocaine (EMLA) 2.5-2.5 % cream, Apply topically 45-60 mins prior to needle poke daily PRN (Patient not taking: Reported on 2022), Disp: 1 Tube, Rfl: 2    ALLERGIES:   Allergies   Allergen Reactions    Bee Pollen Other (See Comments)     Runny nose, watery eyes    Pollen Extract      Runny nose, watery eyes       FAMILY HISTORY:       Problem Relation Age of Onset    Cancer Mother         pancreatic    Cancer Father         bone    Diabetes Sister     Asthma Brother          SOCIAL HISTORY:   Social History     Socioeconomic History    Marital status: Single     Spouse name: Not on file    Number of children: Not on file    Years of education: Not on file    Highest education level: Not on file   Occupational History    Not on file   Tobacco Use    Smoking status: Former Smoker     Packs/day: 1.00     Years: 45.00     Pack years: 45.00     Quit date: 2017     Years since quittin.2    Smokeless tobacco: Never Used   Vaping Use    Vaping Use: Never used   Substance and Sexual Activity    Alcohol use: Not Currently     Comment: quit     Drug use: Not Currently     Frequency: 1.0 times per week     Comment: cocaine,  stopped spring 2016    Sexual activity: Yes     Partners: Female   Other Topics Concern    Not on file   Social History Narrative     in the past, retired     Social Determinants of Health     Financial Resource Strain: Low Risk     Difficulty of Paying Living Expenses: Not hard at all   Food Insecurity: No Food Insecurity    Worried About [address] in the Last Year: Never true    [address] [facility] [address] [facility] [address]  in the Last Year: Never true   Transportation Needs:     Lack of Transportation (Medical): Not on file    Lack of Transportation (Non-Medical):  Not on file   Physical Activity:     Days of Exercise per Week: Not on file    Minutes of Exercise per Session: Not on file   Stress:     Feeling of Stress : Not on file   Social Connections:     Frequency of Communication with Friends and Family: Not on file    Frequency of Social Gatherings with Friends and Family: Not on file    Attends Jew Services: Not on file    Active Member of Clubs or Organizations: Not on file    Attends Club or Organization Meetings: Not on file    Marital Status: Not on file   Intimate Partner Violence:     Fear of Current or Ex-Partner: Not on file    Emotionally Abused: Not on file    Physically Abused: Not on file    Sexually Abused: Not on file   Housing Stability:     Unable to Pay for Housing in the Last Year: Not on file    Number of [place] in the Last Year: Not on file    Unstable Housing in the Last Year: Not on file         REVIEW OF SYSTEMS:         Review of Systems   Constitutional: Positive for fatigue. Negative for appetite change and unexpected weight change. HENT: Positive for hearing loss (rt ear). Negative for sore throat, trouble swallowing and voice change. Eyes: Positive for visual disturbance (wears glasses). Respiratory: Negative. Negative for cough, choking and shortness of breath. Cardiovascular: Negative. Negative for chest pain and leg swelling. Gastrointestinal: Positive for abdominal distention and abdominal pain. Negative for anal bleeding, blood in stool, constipation, diarrhea, nausea, rectal pain and vomiting. Denies   Endocrine: Negative. Negative for polydipsia, polyphagia and polyuria. Genitourinary: Negative for difficulty urinating, frequency, hematuria and urgency. Musculoskeletal: Positive for gait problem (in wheel chair). Negative for back pain, joint swelling and myalgias. Skin: Negative. Allergic/Immunologic: Negative. Negative for environmental allergies, food allergies and immunocompromised state. Neurological: Negative for dizziness, tremors, weakness, light-headedness, numbness and headaches. Hematological: Bruises/bleeds easily. Psychiatric/Behavioral: Positive for sleep disturbance. The patient is nervous/anxious.         PHYSICAL EXAMINATION: Vital signs reviewed per the nursing documentation. /87   Pulse 101   Ht 5' 10\" (1.778 m)   Wt 208 lb 3.2 oz (94.4 kg)   SpO2 97%   BMI 29.87 kg/m²   Body mass index is 29.87 kg/m². Physical Exam  Vitals reviewed. Constitutional:       Appearance: Normal appearance. Comments: Patient has signs of chronic liver disease. HENT:      Head: Normocephalic and atraumatic. Eyes:      Extraocular Movements: Extraocular movements intact. Conjunctiva/sclera: Conjunctivae normal.   Cardiovascular:      Rate and Rhythm: Normal rate and regular rhythm. Heart sounds: Normal heart sounds. Pulmonary:      Effort: Pulmonary effort is normal.      Breath sounds: Normal breath sounds. Abdominal:      General: Bowel sounds are normal. There is distension. Palpations: Abdomen is soft. There is no mass. Tenderness: There is no abdominal tenderness. There is no guarding. Hernia: No hernia is present. Comments: Abdomen is distended with ascites. Musculoskeletal:      Right lower leg: Edema present. Left lower leg: Edema present. Comments: Edema of the lower extremities. Skin:     General: Skin is warm and dry. Neurological:      General: No focal deficit present. Mental Status: He is alert and oriented to person, place, and time.    Psychiatric:         Mood and Affect: Mood normal.         Behavior: Behavior normal.           LABORATORY DATA: Reviewed  Lab Results   Component Value Date    WBC 4.1 04/28/2022    HGB 7.9 (L) 04/28/2022    HCT 24.3 (L) 04/28/2022    MCV 91.3 04/28/2022     04/28/2022     (L) 04/28/2022    K 4.3 04/28/2022     04/28/2022    CO2 22 04/28/2022    BUN 9 04/28/2022    CREATININE 0.50 (L) 04/28/2022    LABPROT 7.7 04/19/2012    LABALBU 3.1 (L) 04/26/2022    BILITOT 2.84 (H) 04/26/2022    ALKPHOS 261 (H) 04/26/2022    AST 95 (H) 04/26/2022    ALT 66 (H) 04/26/2022    INR 1.2 04/26/2022         Lab Results Component Value Date    RBC 2.37 (L) 04/28/2022    HGB 7.9 (L) 04/28/2022    MCV 91.3 04/28/2022    MCH 29.9 04/28/2022    MCHC 32.8 04/28/2022    RDW 24.6 (H) 04/28/2022    MPV 7.5 04/28/2022    BASOPCT 1 04/26/2022    LYMPHSABS 0.67 (L) 04/26/2022    MONOSABS 1.01 04/26/2022    NEUTROABS 6.39 04/26/2022    EOSABS 0.25 04/26/2022    BASOSABS 0.08 04/26/2022         DIAGNOSTIC TESTING:     US LIVER    Result Date: 4/19/2022  EXAMINATION: RIGHT UPPER QUADRANT ULTRASOUND 4/19/2022 9:24 am COMPARISON: None. HISTORY: ORDERING SYSTEM PROVIDED HISTORY: evaluate portal/hepatic vein, r/o PVT TECHNOLOGIST PROVIDED HISTORY: evaluate portal/hepatic vein, r/o PVT FINDINGS: LIVER:  Nodular margins of the liver would suggest hepatic cirrhosis. No focal lesion. Liver 15.6 cm in length. Hepatopetal flow portal vein. BILIARY SYSTEM:  Gallstones and sludge within the gallbladder. No wall thickening. Negative sonographic Silva's sign. Common bile duct is within normal limits measuring 4.6 mm. OTHER: Moderate ascites visualized right upper quadrant. 1. Hepatic cirrhosis. No focal mass. Hepatopetal flow portal vein. 2. Gallstones and sludge within the gallbladder. 3. Ascites RECOMMENDATIONS: Unavailable     XR CHEST PORTABLE    Result Date: 4/26/2022  EXAMINATION: ONE XRAY VIEW OF THE CHEST 4/26/2022 6:18 pm COMPARISON: 04/15/2022 HISTORY: ORDERING SYSTEM PROVIDED HISTORY: SOB TECHNOLOGIST PROVIDED HISTORY: SOB Reason for Exam: shortness of breath FINDINGS: Right-sided central venous catheter remains in place. The lungs are without acute focal process. There is no effusion or pneumothorax. The cardiomediastinal silhouette is stable. The osseous structures are stable. No acute process.      XR CHEST PORTABLE    Result Date: 4/15/2022  EXAMINATION: ONE XRAY VIEW OF THE CHEST 4/15/2022 7:59 am COMPARISON: 02/02/2022, 12/21/2021 HISTORY: ORDERING SYSTEM PROVIDED HISTORY: Dyspnea TECHNOLOGIST PROVIDED HISTORY: Dyspnea Reason for Exam: Dyspnea FINDINGS: Right chest port terminates in the superior vena cava. Mild pulmonary vascular congestion. No focal pulmonary opacities. Calcified granulomata and calcified mediastinal nodes from prior granulomatous infection. Cardiomegaly which is similar to 4 months prior. No acute bony findings. Mild pulmonary vascular congestion. Cardiomegaly. IR US GUIDED PARACENTESIS    Result Date: 4/27/2022  PROCEDURE: PARACENTESIS WITHOUT IMAGE GUIDANCE US ABDOMEN LIMITED 4/27/2022 HISTORY: ORDERING SYSTEM PROVIDED HISTORY: ascites TECHNOLOGIST PROVIDED HISTORY: ascites Alcoholic cirrhosis TECHNIQUE: Informed consent was obtained after a detailed explanation of the procedure including risks, benefits, and alternatives. Universal protocol was followed. A limited ultrasound of the abdomen was performed. The right upper abdomen was prepped and draped in sterile fashion and local anesthesia was achieved with lidocaine. A 5 Azeri needle sheath was advanced into ascites and paracentesis was performed. The patient tolerated the procedure well. Albumin a 5 g IV was administered post procedure. FINDINGS: Limited ultrasound of the abdomen demonstrates ascites. A total of 11 L was removed. Significant reduction ascitic volume demonstrated on postprocedure US imaging. Successful therapeutic paracentesis. IR US GUIDED PARACENTESIS    Result Date: 4/18/2022  PROCEDURE: PARACENTESIS WITH IMAGE GUIDANCE US ABDOMEN LIMITED 4/18/2022 HISTORY: ORDERING SYSTEM PROVIDED HISTORY: worsening acites and SOB TECHNOLOGIST PROVIDED HISTORY: worsening acites and SOB Please remove no more than 5 L TECHNIQUE: Informed consent was obtained after a detailed explanation of the procedure including risks, benefits, and alternatives. Universal protocol was followed. A limited ultrasound of the abdomen was performed and shows large amount of ascites.   The right abdomen was prepped and draped in sterile fashion and local anesthesia was achieved with lidocaine. A 5 Bots[place]an needle sheath was advanced into ascites using realtime ultrasound guidance and paracentesis was performed. The patient tolerated the procedure well. EBL: None FINDINGS: Limited ultrasound of the abdomen demonstrates ascites. A total of 4.8 L of clear yellow fluid was removed. Successful ultrasound guided paracentesis. IR US GUIDED PARACENTESIS    Result Date: 4/14/2022  PROCEDURE: PARACENTESIS WITH IMAGE GUIDANCE US ABDOMEN LIMITED 4/14/2022 HISTORY: ORDERING SYSTEM PROVIDED HISTORY: ascites remove 5 liters only TECHNOLOGIST PROVIDED HISTORY: ascites remove 5 liters only TECHNIQUE: Informed consent was obtained after a detailed explanation of the procedure including risks, benefits, and alternatives. Universal protocol was followed. A limited ultrasound of the abdomen was performed and shows a moderate to large amount of ascites. The right abdomen was prepped and draped in sterile fashion and local anesthesia was achieved with lidocaine. A 5 Botswanan needle sheath was advanced into ascites using realtime ultrasound guidance and paracentesis was performed. The patient tolerated the procedure well. EBL: None FINDINGS: Limited ultrasound of the abdomen demonstrates ascites. A total of 5 L of slightly turbid yellow colored fluid was removed. Additional fluid remains on completion. Successful ultrasound-guided paracentesis. IR US GUIDED PARACENTESIS    Result Date: 4/5/2022  PROCEDURE: IR US GUIDED PARACENTESIS W IMAGING 4/5/2022 HISTORY: ORDERING SYSTEM PROVIDED HISTORY: Ascites due to alcoholic cirrhosis (HCC) TECHNIQUE: Sonography was utilized CONTRAST: None SEDATION: None FLUOROSCOPY DOSE AND TYPE OR TIME AND EXPOSURES: None DESCRIPTION OF PROCEDURE: Informed consent was obtained after a detailed explanation of the procedure including risks, benefits, and alternatives. Universal protocol was observed.   Preprocedure ultrasound shows a dominant fluid pocket in the right lowerquadrant. Using ultrasound guidance (image attached to the medical record), the fluid pocket was accessed with a 5 [place] Yueh needle with aspiration of inch clear yellow fluid. Vacuum bottle paracentesis was performed and approximately 7.25 L was removed. the patient tolerated the procedure well and left the department in good condition. Dr. Susanne Rosas was present. Patient received IV albumin replacement. FINDINGS: 7.25 L drained     Successful ultrasound-guided therapeutic paracentesis     CT CHEST ABDOMEN PELVIS W CONTRAST    Result Date: 4/25/2022  EXAMINATION: CT OF THE CHEST, ABDOMEN, AND PELVIS WITH CONTRAST 4/25/2022 9:59 am TECHNIQUE: CT of the chest, abdomen and pelvis was performed with the administration of intravenous contrast. Multiplanar reformatted images are provided for review. Dose modulation, iterative reconstruction, and/or weight based adjustment of the mA/kV was utilized to reduce the radiation dose to as low as reasonably achievable. COMPARISON: 01/31/2022 HISTORY: ORDERING SYSTEM PROVIDED HISTORY: Esophageal adenocarcinoma ([facility] [address] TECHNOLOGIST PROVIDED HISTORY: follow up Reason for Exam: follow up esophageal cancer Additional signs and symptoms: pt sts increasing abdominal pain and shortness of breath. Sts bloating. FINDINGS: Chest: Mediastinum: The cardiac size is normal. Aortic and coronary artery calcifications. No significant mediastinal lymphadenopathy. .  Thyroid gland grossly normal in visualized portions. Patulous esophagus with intraluminal contrast.  Mild thickening of the esophagus most pronounced in the distal 3rd is noted. The appears to be a sliding hiatal hernia as well. Some periesophageal varices are noted similar to prior. Lungs/pleura: Calcified lingular nodule. No follow-up required. No evidence for metastatic nodules. There is mild centrilobular emphysema. Central airways unremarkable.  Soft Tissues/Bones: Right-sided infusion port terminates in SVC. No acute bony abnormalities. No aggressive lytic or blastic lesions. No significant superficial soft tissue lesion. Abdomen/Pelvis: Organs: Nodular shrunken cirrhotic liver in keeping with history of cirrhosis. No focal liver lesion. No splenomegaly. Pancreas, adrenal glands, kidneys show no significant abnormalities. Cholelithiasis noted. GI/Bowel: Small sliding hiatal hernia is suggested. Small bowel loops show no acute process or focal lesions. Unremarkable colon. No evidence for bowel obstruction. Pelvis: Urinary bladder is unremarkable. Peritoneum/Retroperitoneum: Large volume ascites increased from prior. Periesophageal varices as noted above. Normal enhancement of portal and splenic vein. No evidence for thrombosis. 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 There is no significant lymphadenopathy. Bones/Soft Tissues: No acute bony abnormalities. Diffuse degenerative changes in the spine. No aggressive lytic or blastic lesions. No significant superficial soft tissue lesion. 1. No clear evidence for metastatic disease. 2. Mild diffuse thickening of the esophagus most pronounced in the lower portion where there also appears to be a small sliding hiatal hernia. Consistent with history of esophageal cancer. 3. Cirrhotic liver with a few periesophageal varices. 4. Large volume ascites increased from prior. 5. Cholelithiasis unchanged. Assessment  No diagnosis found. Plan  At present patient is on Aldactone 150 mg a day. Advised to increase 200 mg a day. Also advised to increase Lasix 40 mg twice a day. Patient will have labs in the next 4 to 5 days. Advised to see me in the next 5 to 6 days. In between if he develops any dizziness, weakness tiredness etc. to contact me. They understood and agreed. Thank you for allowing me to participate in the care of Mr. Sharita Monroe.  For any further questions please do not hesitate to contact me.    I have reviewed and agree with the ROS entered by the MA/LPN. Note is dictated utilizing voice recognition software. Unfortunately this leads to occasional typographical errors.  Please contact our office if you have any questions        Barbara Alvarez MD,FACP, [facility]  Board Certified in Gastroenterology and [address] [place] [address] Gastroenterology  Office #: [phone]

## 2022-05-04 ENCOUNTER — HOSPITAL ENCOUNTER (OUTPATIENT)
Dept: PAIN MANAGEMENT | Facility: CLINIC | Age: 62
Discharge: HOME | End: 2022-05-04
Payer: MEDICARE

## 2022-05-04 VITALS
TEMPERATURE: 96.8 F | HEIGHT: 70 IN | DIASTOLIC BLOOD PRESSURE: 85 MMHG | OXYGEN SATURATION: 100 % | BODY MASS INDEX: 29.35 KG/M2 | HEART RATE: 83 BPM | WEIGHT: 205 LBS | RESPIRATION RATE: 21 BRPM | SYSTOLIC BLOOD PRESSURE: 156 MMHG

## 2022-05-04 DIAGNOSIS — M48.062 SPINAL STENOSIS OF LUMBAR REGION WITH NEUROGENIC CLAUDICATION: Primary | ICD-10-CM

## 2022-05-04 DIAGNOSIS — R52 PAIN MANAGEMENT: ICD-10-CM

## 2022-05-04 PROCEDURE — 62323 NJX INTERLAMINAR LMBR/SAC: CPT | Performed by: ANESTHESIOLOGY

## 2022-05-04 PROCEDURE — 62323 NJX INTERLAMINAR LMBR/SAC: CPT

## 2022-05-04 PROCEDURE — 2500000003 HC RX 250 WO HCPCS: Performed by: ANESTHESIOLOGY

## 2022-05-04 PROCEDURE — 6360000002 HC RX W HCPCS: Performed by: ANESTHESIOLOGY

## 2022-05-04 PROCEDURE — 6360000004 HC RX CONTRAST MEDICATION: Performed by: ANESTHESIOLOGY

## 2022-05-04 RX ORDER — DEXAMETHASONE SODIUM PHOSPHATE 10 MG/ML
INJECTION, SOLUTION INTRAMUSCULAR; INTRAVENOUS
Status: COMPLETED | OUTPATIENT
Start: 2022-05-04 | End: 2022-05-04

## 2022-05-04 RX ORDER — LIDOCAINE HYDROCHLORIDE 10 MG/ML
INJECTION, SOLUTION EPIDURAL; INFILTRATION; INTRACAUDAL; PERINEURAL
Status: COMPLETED | OUTPATIENT
Start: 2022-05-04 | End: 2022-05-04

## 2022-05-04 RX ADMIN — DEXAMETHASONE SODIUM PHOSPHATE 10 MG: 10 INJECTION, SOLUTION INTRAMUSCULAR; INTRAVENOUS at 14:50

## 2022-05-04 RX ADMIN — IOHEXOL 3 ML: 180 INJECTION INTRAVENOUS at 14:50

## 2022-05-04 RX ADMIN — LIDOCAINE HYDROCHLORIDE 5 ML: 10 INJECTION, SOLUTION EPIDURAL; INFILTRATION; INTRACAUDAL at 14:50

## 2022-05-04 ASSESSMENT — PAIN - FUNCTIONAL ASSESSMENT
PAIN_FUNCTIONAL_ASSESSMENT: PREVENTS OR INTERFERES WITH ALL ACTIVE AND SOME PASSIVE ACTIVITIES
PAIN_FUNCTIONAL_ASSESSMENT: 0-10
PAIN_FUNCTIONAL_ASSESSMENT: NONE - DENIES PAIN

## 2022-05-04 ASSESSMENT — PAIN DESCRIPTION - DESCRIPTORS: DESCRIPTORS: SHOOTING;ACHING

## 2022-05-04 NOTE — H&P
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 Theresa Lancaster, orthopedic, every 3-4 months, gets steroid injection    Lezama esophagus     BPH (benign prostatic hypertrophy)     Cholelithiasis     Cirrhosis ([address]     COVID-19 12/2020    pt reports he had a positive test while at [facility] in 2020, was asymptomatic    COVID-19 vaccine series completed 5/20/2021, 6/22/2021    Moderna 5/20/2021, 6/22/2021    DDD (degenerative disc disease), lumbar     Depression     Esophageal cancer ([address]     INVASIVE ADENOCARCINOMA ARISING IN TUBULAR ADENOMA WITH HIGH GRADE DYSPLASIA, ASSOCIATED WITH FOCAL INTESTINAL METAPLASIA     Esophageal varices ([address]     Fatty liver     GERD (gastroesophageal reflux disease)     Hep C w/o coma, chronic ([address]     History of alcohol abuse     6-12 beers a day; quit drinking July 2016    History of blood transfusion     History of colon polyps 2016    History of tobacco abuse     [place] (hard of hearing)     Hyperlipidemia     Hypertension     Port-A-Cath in place     right upper chest    Sciatica     Spinal stenosis     Stomach ulcer     hx of    Tubular adenoma of colon 2016, 2018    Vitamin D deficiency     Wears glasses        Past Surgical History:   Procedure Laterality Date    BUNIONECTOMY      twice on right side    BUNIONECTOMY Left     CARPAL TUNNEL RELEASE Right     COLONOSCOPY      at age 35    COLONOSCOPY  10/05/2016    polyps-pathology tubular adenoma, and abnormal looking mucosa right colon-pathology-tubular adenoma    COLONOSCOPY N/A 3/30/2018    COLONOSCOPY POLYPECTOMY COLD BIOPSY performed by Ari Luis MD at [place] [address]  03/30/2018    Small polyp in the sigmoid colon and excised with biopsy forceps--tubular adenoma    COLONOSCOPY N/A 4/16/2022    COLONOSCOPY POLYPECTOMY performed by Shanita Lopez MD at [address], COLON, DIAGNOSTIC      EGD    IR PORT PLACEMENT EQUAL OR GREATER THAN 5 YEARS  4/19/2021    IR PORT PLACEMENT EQUAL OR GREATER THAN 5 YEARS 4/19/2021 [facility]    KNEE SURGERY Left     cyst removed    NASAL SEPTUM SURGERY      NERVE BLOCK Right 11/23/2020    NERVE BLOCK RIGHT CERVICAL STEROID INJECTION  C3-C6 performed by Tyson Shukla MD at [place] [address]  01/04/16    steroid injection C7 T1    OTHER SURGICAL HISTORY  11/21/2016    Bilateral Lumbar CACHORRO L4-L5 injections    OTHER SURGICAL HISTORY  12/19/2016    lumbar steroid injection    OTHER SURGICAL HISTORY  09/28/2018    BILATERAL L5 CACHORRO (N/A Back)    OTHER SURGICAL HISTORY Right 11/23/2020    cervical injection    PAIN MANAGEMENT PROCEDURE Left 7/9/2020    EPIDURAL STEROID INJECTION LEFT L4 L5 performed by Tyson Shukla MD at [facility] Left 7/20/2020    LEFT L4 L5 EPIDURAL STEROID INJECTION performed by Tyson Shukla MD at [facility] Bilateral 8/17/2020    LUMBAR FACET BILATERAL L2-L5 performed by Tyson Shukla MD at [facility] Bilateral 12/7/2020    NERVE BLOCK BILATERAL LUMBAR MEDIAL BRANCH L2-L5 performed by Tyson Shukla MD at [address] AA&/STRD TFRML EPI LUMBAR/SACRAL 1 LEVEL Bilateral 9/6/2018    BILATERAL L5 CACHORRO performed by Tyson Shukla MD at [address] AA&/STRD TFRML EPI LUMBAR/SACRAL 1 LEVEL N/A 9/28/2018    BILATERAL L5 CACHORRO performed by Tyson Shukla MD at [facility] N/CARPAL TUNNEL SURG Right 8/29/2017    CARPAL TUNNEL RELEASE RIGHT performed by Stoney Syde MD at [facility] N/CARPAL TUNNEL SURG Left 10/31/2017    CARPAL TUNNEL RELEASE performed by Stoney Syed MD at [address] [place] [address] 12/29/2020    EGD BIOPSY performed by Paige Peña MD at [place] [address] N/A 2/2/2021    EGD BIOPSY and spot marking performed by Radha Hernandez MD at [place] [address] N/A 2/12/2021    ENDOSCOPIC ULTRASOUND, EGD performed by Willie Jiang MD at [facility] ENDOSCOPY  2/12/2021    EGD DIAGNOSTIC ONLY performed by Willie Jiang MD at [address] [place] [address] N/A 8/31/2021    EGD BIOPSY performed by Radha Hernandez MD at [place] [address] N/A 1/21/2022    EGD BIOPSY performed by Radha Hernandez MD at [place] [address] N/A 4/15/2022    EGD ESOPHAGOGASTRODUODENOSCOPY performed by Paige Peña MD at [address] [place] [address]         Family History   Problem Relation Age of Onset    Cancer Mother         pancreatic    Cancer Father         bone    Diabetes Sister     Asthma Brother        Allergies   Allergen Reactions    Bee Pollen Other (See Comments)     Runny nose, watery eyes    Pollen Extract      Runny nose, watery eyes         Current Outpatient Medications:     oxyCODONE-acetaminophen (PERCOCET) 5-325 MG per tablet, Take 1 tablet by mouth daily as needed for Pain.  (Patient not taking: Reported on 5/2/2022), Disp: , Rfl:     FLUoxetine (PROZAC) 20 MG capsule, Take 1 capsule by mouth daily, Disp: 30 capsule, Rfl: 3    ondansetron (ZOFRAN-ODT) 4 MG disintegrating tablet, Take 1 tablet by mouth every 8 hours as needed for Nausea or Vomiting, Disp: 10 tablet, Rfl: 0    atorvastatin (LIPITOR) 20 MG tablet, Take 1 tablet by mouth nightly, Disp: 30 tablet, Rfl: 3    furosemide (LASIX) 40 MG tablet, Take 0.5 tablets by mouth 2 times daily, Disp: 60 tablet, Rfl: 3    spironolactone (ALDACTONE) 100 MG tablet, Take 1 tablet by mouth every morning (Patient taking differently: Take 100 mg by mouth 2 times daily ), Disp: 30 tablet, Rfl: 3    spironolactone (ALDACTONE) 50 MG tablet, Take 1 tablet by mouth every evening (Patient not taking: Reported on 2022), Disp: 30 tablet, Rfl: 3    midodrine (PROAMATINE) 5 MG tablet, Take 1 tablet by mouth 3 times daily as needed (SBP <110), Disp: 90 tablet, Rfl: 3    Probiotic Acidophilus (FLORANEX) TABS, Take 1 tablet by mouth 2 times daily, Disp: 60 tablet, Rfl: 0    rifAXIMin (XIFAXAN) 550 MG tablet, Take 1 tablet by mouth 2 times daily (Patient not taking: Reported on 2022), Disp: 42 tablet, Rfl: 1    lactulose (CHRONULAC) 10 GM/15ML solution, Take 30 mLs by mouth 3 times daily, Disp: 473 mL, Rfl: 1    cyclobenzaprine (FLEXERIL) 10 MG tablet, Take 10 mg by mouth daily as needed for Muscle spasms (Patient not taking: Reported on 2022), Disp: , Rfl:     nadolol (CORGARD) 20 MG tablet, Take 1 tablet by mouth daily, Disp: 30 tablet, Rfl: 3    ferrous sulfate (IRON 325) 325 (65 Fe) MG tablet, Take 1 tablet by mouth 2 times daily, Disp: 60 tablet, Rfl: 5    omeprazole (PRILOSEC) 40 MG delayed release capsule, take 1 capsule by mouth EVERY MORNING BEFORE BREAKFAST, Disp: 30 capsule, Rfl: 2    lidocaine-prilocaine (EMLA) 2.5-2.5 % cream, Apply topically 45-60 mins prior to needle poke daily PRN (Patient not taking: Reported on 2022), Disp: 1 Tube, Rfl: 2    Social History     Tobacco Use    Smoking status: Former Smoker     Packs/day: 1.00     Years: 45.00     Pack years: 45.00     Quit date: 2017     Years since quittin.2    Smokeless tobacco: Never Used   Substance Use Topics    Alcohol use: Not Currently     Comment: quit 2019       Review of Systems:   Focused review of systems was performed, and negative as pertinent to diagnosis, except as stated in HPI. Physical Exam  Constitutional:       Appearance: Normal appearance. Pulmonary:      Effort: Pulmonary effort is normal.   Neurological:      Mental Status: alert.    Psychiatric:         Attention and Perception: Attention and perception normal.         Mood and Affect: Mood and affect normal.   Cardiovascular:      Rate: Normal rate. ASA: 3          Mallampati: 3       Patient's current physical status, medications, medical history, and HPI have been reviewed and updated as appropriate on this date: 05/04/22    Risk/Benefit(s): The risks, benefits, alternatives, and potential complications have been discussed with the patient/family and informed consent has been obtained for the procedure/sedation. Diagnosis:   1.  Spinal stenosis of lumbar region with neurogenic claudication            Plan:   Lumbar thee        Davina Nichols MD

## 2022-05-04 NOTE — OP NOTE
Patient Name: Haven Bear   YOB: 1960  Room/Bed: Room/bed info not found  Medical Record Number: [id number]  Date: 5/4/2022       Sedation/ Anesthesia Plan:   intravenous sedation   as needed. Medications Planned:   midazolam (Versed) / Fentanyl  Intravenously  as needed. Preoperative Diagnosis:    1. Spinal stenosis of lumbar region with neurogenic claudication        Postoperative Diagnosis:    1. Spinal stenosis of lumbar region with neurogenic claudication        Procedure Performed:  Lumbar epidural steroid injection under fluoroscopy guidance    Blood Loss: None    Procedure: The Patient was seen in the preop area, chart was reviewed, informed consent was obtained. Patient was taken to procedure room and was placed in prone position. Vital signs were monitored through out the  Procedure. A time out was completed. The skin over the back was prepped and draped in sterile manner. The target point was marked at The interlaminar space at L4/5 . Skin and deep tissues were anesthetized with 1 % lidocaine. A 20-gauge Tuohy epidural needlele was advanced  under fluoroscopy guidance in AP view. Epidural space was identified using MED technique. Position ws confirmed in Lateral view. Then after negative aspiration contrast dye Omnipaque-180 was injected with live fluoroscopy in AP views that showed  spread of the contrast in the epidural space  and no vascular runoff or intrathecal spread. Finally 5 ml of treatment solution containing 4 ml of PF NS and 1 ml of DEXAMETHASONE 10 mg / ml was injected. The needle was removed and a Band-Aid was placed over the needle  insertion site. The patient's vital signs remained stable and the patient tolerated the procedure well.       Electronically signed by Osman Augustin MD on 5/4/2022 at 2:53 PM

## 2022-05-05 ENCOUNTER — TELEPHONE (OUTPATIENT)
Dept: ONCOLOGY | Age: 62
End: 2022-05-05

## 2022-05-05 ENCOUNTER — OFFICE VISIT (OUTPATIENT)
Dept: ONCOLOGY | Age: 62
End: 2022-05-05
Payer: MEDICARE

## 2022-05-05 VITALS
BODY MASS INDEX: 30.07 KG/M2 | DIASTOLIC BLOOD PRESSURE: 84 MMHG | WEIGHT: 209.6 LBS | HEART RATE: 82 BPM | RESPIRATION RATE: 22 BRPM | TEMPERATURE: 97.7 F | OXYGEN SATURATION: 100 % | SYSTOLIC BLOOD PRESSURE: 128 MMHG

## 2022-05-05 DIAGNOSIS — R97.8 OTHER ABNORMAL TUMOR MARKERS: ICD-10-CM

## 2022-05-05 DIAGNOSIS — C15.9 ESOPHAGEAL ADENOCARCINOMA (HCC): ICD-10-CM

## 2022-05-05 PROCEDURE — 99214 OFFICE O/P EST MOD 30 MIN: CPT | Performed by: INTERNAL MEDICINE

## 2022-05-05 PROCEDURE — 99211 OFF/OP EST MAY X REQ PHY/QHP: CPT | Performed by: INTERNAL MEDICINE

## 2022-05-05 NOTE — PROGRESS NOTES
Patient ID: Ashley Hammond, [birth year], [id number], 57 y.o. Referred by : Dr Odell Parks  Reason for consultation:   Esophageal cancer T2N0Mx  Stage II   started on concurrent chemoradiation preoperatively on 5/4/21  Chemoradiation completed 6/9/21  Patient had a PET scan on 7/23/2021 which showed no evidence of recurrence  Patient has been evaluated by thoracic surgeon at [facility] Dr. Sofi Petty and also hepatologist Dr. Delvin Irving his case was discussed at thoracic tumor oncology board with recommendations NOT to do any surgery because of his liver failure. So surveillance recommended  He had an upper endoscopy on 8/31 which showed no residual cancer but showed Lezama's esophagus with high-grade dysplasia. HISTORY OF PRESENT ILLNESS:    Oncologic History:  Ashley Hammond is a 57 y.o. male with a history of hepatitis C, status post treatment, liver cirrhosis, history of alcohol abuse was seen during initial consultation visit for newly diagnosed esophageal cancer. Patient reportedly had \"heavy drinking alcohol in about 2016 however recently in December he had 2 beers and he presented with hematemesis. On 12/29/2020 he had upper endoscopy which showed severe portal hypertension, gastropathy. The biopsy from the GE junction showed invasive adenocarcinoma. Patient had a follow-up EGD on 2/2/2021 which confirmed esophageal adenocarcinoma. Patient had endoscopic ultrasound on 2/12/2021 which showed T2 N0 MX disease with underlying esophageal varices. In the esophagus hypoechoic lesion noted in the lower esophagus penetrating into submucosa and into muscularis propria. No lymphadenopathy noted. Patient was referred to medical oncology for further recommendations. He denies any difficulty swallowing. He does not smoke he has quit smoking in 2016. He has not drank alcohol since December. He has a history of hepatitis C and he has received treatment. He lives by himself.   He is accompanied by his ex-wife during today's office visit. INTERVAL HISTORY:   Patient is returning for follow-up visit and to discuss lab results, imaging studies and further recommendations. He was recently admitted to hospital with anemia and also infection. He had a paracentesis and also following with gastroenterologist.  He is acetic fluid was negative for malignancy. He had a EGD and colonoscopy. His EGD did show a polypoid lesion at the GE junction however biopsy was not performed due to risk of bleeding. He is planning to have a follow-up appointment with gastroenterology next week. He feels much better. He denies any chest pain, shortness of breath, nausea vomiting. During this visit patient's allergy, social, medical, surgical history and medications were reviewed and updated.     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 Bernhard Oppenheim, orthopedic, every 3-4 months, gets steroid injection    Lezama esophagus     BPH (benign prostatic hypertrophy)     Cholelithiasis     Cirrhosis ([address]     COVID-19 12/2020    pt reports he had a positive test while at [facility] in 2020, was asymptomatic    COVID-19 vaccine series completed 5/20/2021, 6/22/2021    Moderna 5/20/2021, 6/22/2021    DDD (degenerative disc disease), lumbar     Depression     Esophageal cancer ([address]     INVASIVE ADENOCARCINOMA ARISING IN TUBULAR ADENOMA WITH HIGH GRADE DYSPLASIA, ASSOCIATED WITH FOCAL INTESTINAL METAPLASIA     Esophageal varices ([address]     Fatty liver     GERD (gastroesophageal reflux disease)     Hep C w/o coma, chronic ([address]     History of alcohol abuse     6-12 beers a day; quit drinking July 2016    History of blood transfusion     History of colon polyps 2016    History of tobacco abuse     [place] (hard of hearing)     Hyperlipidemia     Hypertension     Port-A-Cath in place     right upper chest    Sciatica  Spinal stenosis     Stomach ulcer     hx of    Tubular adenoma of colon 2016, 2018    Vitamin D deficiency     Wears glasses        Past Surgical History:   Procedure Laterality Date    BUNIONECTOMY      twice on right side    BUNIONECTOMY Left     CARPAL TUNNEL RELEASE Right     COLONOSCOPY      at age 35    COLONOSCOPY  10/05/2016    polyps-pathology tubular adenoma, and abnormal looking mucosa right colon-pathology-tubular adenoma    COLONOSCOPY N/A 3/30/2018    COLONOSCOPY POLYPECTOMY COLD BIOPSY performed by Puneet Cedillo MD at [address] [place] [address]  03/30/2018    Small polyp in the sigmoid colon and excised with biopsy forceps--tubular adenoma    COLONOSCOPY N/A 4/16/2022    COLONOSCOPY POLYPECTOMY performed by Puneet Cedillo MD at [place] [address], COLON, DIAGNOSTIC      EGD    IR PORT PLACEMENT EQUAL OR GREATER THAN 5 YEARS  4/19/2021    IR PORT PLACEMENT EQUAL OR GREATER THAN 5 YEARS 4/19/2021 [facility] SPECIAL PROCEDURES    KNEE SURGERY Left     cyst removed    NASAL SEPTUM SURGERY      NERVE BLOCK Right 11/23/2020    NERVE BLOCK RIGHT CERVICAL STEROID INJECTION  C3-C6 performed by Gamal Fuentes MD at [address] [place] [address]  01/04/16    steroid injection C7 T1    OTHER SURGICAL HISTORY  11/21/2016    Bilateral Lumbar CACHORRO L4-L5 injections    OTHER SURGICAL HISTORY  12/19/2016    lumbar steroid injection    OTHER SURGICAL HISTORY  09/28/2018    BILATERAL L5 CACHORRO (N/A Back)    OTHER SURGICAL HISTORY Right 11/23/2020    cervical injection    PAIN MANAGEMENT PROCEDURE Left 7/9/2020    EPIDURAL STEROID INJECTION LEFT L4 L5 performed by Gamal Fuentes MD at [address] [place] [address] Left 7/20/2020    LEFT L4 L5 EPIDURAL STEROID INJECTION performed by Gamal Fuentes MD at [address] [place] [address] Bilateral 8/17/2020    LUMBAR FACET BILATERAL L2-L5 performed by Gamal Fuentes MD at [address] [place] [address] Bilateral 12/7/2020 NERVE BLOCK BILATERAL LUMBAR MEDIAL BRANCH L2-L5 performed by Alejandrina Puckett MD at [address] AA&/STRD TFRML EPI LUMBAR/SACRAL 1 LEVEL Bilateral 9/6/2018    BILATERAL L5 CACHORRO performed by Alejandrina Puckett MD at [address] AA&/STRD TFRML EPI LUMBAR/SACRAL 1 LEVEL N/A 9/28/2018    BILATERAL L5 CACHORRO performed by Alejandrina Puckett MD at [address] [facility] N/CARPAL TUNNEL SURG Right 8/29/2017    CARPAL TUNNEL RELEASE RIGHT performed by Angie Dill MD at [facility] N/CARPAL TUNNEL SURG Left 10/31/2017    CARPAL TUNNEL RELEASE performed by Angie Dill MD at [address] [place] [address] 12/29/2020    EGD BIOPSY performed by Colby Goss MD at [address] [place] [address] N/A 2/2/2021    EGD BIOPSY and spot marking performed by Prasanna Singh MD at [address] [place] [address] 2/12/2021    ENDOSCOPIC ULTRASOUND, EGD performed by Bryon Valadez MD at [address] [place] [address]  2/12/2021    EGD DIAGNOSTIC ONLY performed by Bryon Valadez MD at [address] [place] [address] N/A 8/31/2021    EGD BIOPSY performed by Prasanna Singh MD at [address] [place] [address] 1/21/2022    EGD BIOPSY performed by Prasanna Singh MD at [address] [place] [address] 4/15/2022    EGD ESOPHAGOGASTRODUODENOSCOPY performed by Colby Goss MD at [address] [place] [address]   Allergen Reactions    Bee Pollen Other (See Comments)     Runny nose, watery eyes    Pollen Extract      Runny nose, watery eyes       Current Outpatient Medications   Medication Sig Dispense Refill    FLUoxetine (PROZAC) 20 MG capsule Take 1 capsule by mouth daily 30 capsule 3    ondansetron (ZOFRAN-ODT) 4 MG disintegrating tablet Take 1 tablet by mouth every 8 hours as needed for Nausea or Vomiting 10 tablet 0    atorvastatin (LIPITOR) 20 MG tablet Take 1 tablet by mouth nightly 30 tablet 3    furosemide (LASIX) 40 MG tablet Take 0.5 tablets by mouth 2 times daily 60 tablet 3    spironolactone (ALDACTONE) 50 MG tablet Take 1 tablet by mouth every evening 30 tablet 3    midodrine (PROAMATINE) 5 MG tablet Take 1 tablet by mouth 3 times daily as needed (SBP <110) 90 tablet 3    Probiotic Acidophilus (FLORANEX) TABS Take 1 tablet by mouth 2 times daily 60 tablet 0    lactulose (CHRONULAC) 10 GM/15ML solution Take 30 mLs by mouth 3 times daily 473 mL 1    ferrous sulfate (IRON 325) 325 (65 Fe) MG tablet Take 1 tablet by mouth 2 times daily 60 tablet 5    omeprazole (PRILOSEC) 40 MG delayed release capsule take 1 capsule by mouth EVERY MORNING BEFORE BREAKFAST 30 capsule 2     No current facility-administered medications for this visit.        Social History     Socioeconomic History    Marital status: Single     Spouse name: Not on file    Number of children: Not on file    Years of education: Not on file    Highest education level: Not on file   Occupational History    Not on file   Tobacco Use    Smoking status: Former Smoker     Packs/day: 1.00     Years: 45.00     Pack years: 45.00     Quit date: 2017     Years since quittin.2    Smokeless tobacco: Never Used   Vaping Use    Vaping Use: Never used   Substance and Sexual Activity    Alcohol use: Not Currently     Comment: quit     Drug use: Not Currently     Frequency: 1.0 times per week     Comment: cocaine,  stopped spring 2016    Sexual activity: Yes     Partners: Female   Other Topics Concern    Not on file   Social History Narrative     in the past, retired     Social Determinants of Health     Financial Resource Strain: [address] Difficulty of Paying Living Expenses: Not hard at all   Food Insecurity: No Food Insecurity    Worried About [address] in the Last Year: Never true    [address] in the Last Year: Never true Transportation Needs:     Lack of Transportation (Medical): Not on file    Lack of Transportation (Non-Medical): Not on file   Physical Activity:     Days of Exercise per Week: Not on file    Minutes of Exercise per Session: Not on file   Stress:     Feeling of Stress : Not on file   Social Connections:     Frequency of Communication with Friends and Family: Not on file    Frequency of Social Gatherings with Friends and Family: Not on file    Attends Denominational Services: Not on file    Active Member of [address] [place] [address] or Organizations: Not on file    Attends Club or Organization Meetings: Not on file    Marital Status: Not on file   Intimate Partner Violence:     Fear of Current or Ex-Partner: Not on file    Emotionally Abused: Not on file    Physically Abused: Not on file    Sexually Abused: Not on file   Housing Stability:     Unable to Pay for Housing in the Last Year: Not on file    Number of [place] in the Last Year: Not on file    Unstable Housing in the Last Year: Not on file       Family History   Problem Relation Age of Onset    Cancer Mother         pancreatic    Cancer Father         bone    Diabetes Sister     Asthma Brother         REVIEW OF SYSTEM:     Constitutional: No fever or chills. No night sweats, no weight loss   Eyes: No eye discharge, double vision, or eye pain   HEENT: negative for sore mouth, sore throat, hoarseness and voice change   Respiratory: negative for cough , sputum, dyspnea, wheezing, hemoptysis, chest pain   Cardiovascular: negative for chest pain, dyspnea, palpitations, orthopnea, PND   Gastrointestinal: negative for nausea, vomiting, diarrhea, constipation, abdominal pain, Dysphagia, hematemesis and hematochezia   Genitourinary: negative for frequency, dysuria, nocturia, urinary incontinence, and hematuria   Integument: negative for rash, skin lesions, bruises.    Hematologic/Lymphatic: negative for easy bruising, bleeding, lymphadenopathy, petechiae and swelling/edema   Endocrine: negative for heat or cold intolerance, tremor, weight changes, change in bowel habits and hair loss   Musculoskeletal: negative for myalgias, arthralgias, pain, joint swelling,and bone pain   Neurological: negative for headaches, dizziness, seizures, weakness, numbness       OBJECTIVE:         Vitals:    05/05/22 1047   BP: 128/84   Pulse: 82   Resp: 22   Temp: 97.7 °F (36.5 °C)   SpO2: 100%       PHYSICAL EXAM:   General appearance - well appearing, no in pain or distress   Mental status - alert and cooperative   Eyes - pupils equal and reactive, extraocular eye movements intact   Ears - bilateral TM's and external ear canals normal   Mouth - mucous membranes moist, pharynx normal without lesions   Neck - supple, no significant adenopathy   Lymphatics - no palpable lymphadenopathy, no hepatosplenomegaly   Chest - clear to auscultation, no wheezes, rales or rhonchi, symmetric air entry   Heart - normal rate, regular rhythm, normal S1, S2, no murmurs, rubs, clicks or gallops   Abdomen - soft, nontender, nondistended, no masses or organomegaly   Neurological - alert, oriented, normal speech, no focal findings or movement disorder noted   Musculoskeletal - no joint tenderness, deformity or swelling   Extremities - peripheral pulses normal, no pedal edema, no clubbing or cyanosis   Skin - normal coloration and turgor, no rashes, no suspicious skin lesions noted   LABORATORY DATA:     Lab Results   Component Value Date    WBC 4.1 04/28/2022    HGB 7.9 (L) 04/28/2022    HCT 24.3 (L) 04/28/2022    MCV 91.3 04/28/2022     04/28/2022    LYMPHOPCT 8 (L) 04/26/2022    RBC 2.37 (L) 04/28/2022    MCH 29.9 04/28/2022    MCHC 32.8 04/28/2022    RDW 24.6 (H) 04/28/2022    MONOPCT 12 (H) 04/26/2022    BASOPCT 1 04/26/2022    NEUTROABS 6.39 04/26/2022    LYMPHSABS 0.67 (L) 04/26/2022    MONOSABS 1.01 04/26/2022    EOSABS 0.25 04/26/2022    BASOSABS 0.08 04/26/2022       Chemistry        Component Value Date/Time     (L) 04/28/2022 0605    K 4.3 04/28/2022 0605     04/28/2022 0605    CO2 22 04/28/2022 0605    BUN 9 04/28/2022 0605    CREATININE 0.50 (L) 04/28/2022 0605        Component Value Date/Time    CALCIUM 8.4 (L) 04/28/2022 0605    ALKPHOS 261 (H) 04/26/2022 1830    AST 95 (H) 04/26/2022 1830    ALT 66 (H) 04/26/2022 1830    BILITOT 2.84 (H) 04/26/2022 1830        PATHOLOGY DATA:   Surgical Pathology Report  Surgical Pathology  Collected: 12/29/20 1334   Lab status: Final   Resulting lab: [facility] LAB   Value: [id number]   [facility]   [address]   [phone]   Fax: [phone]   SURGICAL PATHOLOGY REPORT     Patient Name: Kate Burgess   MR#: [id number]   Specimen #[id number]         Final Diagnosis   GASTROESOPHAGEAL JUNCTION, BIOPSY:          INVASIVE ADENOCARCINOMA    Final Diagnosis   ESOPHAGUS, LESION AT 34 CM, BIOPSY:          INVASIVE ADENOCARCINOMA ARISING IN TUBULAR ADENOMA WITH HIGH   GRADE DYSPLASIA, ASSOCIATED WITH FOCAL INTESTINAL METAPLASIA (SEE   COMMENT)     Diagnosis Comment   The malignancy diagnosis is confirmed by review of a second   pathologist. Luke Kailee result of HER2 IHC [facility]   gastroesophageal adenocarcinoma will be issued in an addendum.    ADDENDUM (SCM)     Date Ordered:     2/16/2021     Status: Signed Out        Date Complete:     2/16/2021     By: Grayson Bran M.D.        Date Reported:     2/16/2021       ADDENDUM COMMENT   This addendum is issued in order to incorporate the result of HER2 by   [address] [place] [address], performed at [address] [place] [address] ([id number], 02/16/2021).        \"RESULTS:  INDETERMINATE     Interpretation:     Average HER2 signals/nucleus:  4.4   Average SUNG 17 signals/nucleus:  3.4   HER2/SUNG 17 signal ratio:  1.3   Number of Observers:  2     Even after analysis of additional cells and review of slides by a   pathologist, FISH results show a HER2/SUNG 17 ratio < 2.0 and an   average of 4-6 HER2 signals per nucleus and 3 or more SUNG 17 signals   per nucleus.  The results are INDETERMINATE.       In this situation, the 2016 CAP/ASCP/ASCO guidelines recommend   selecting a different tumor block for HER2 testing, if available. \"       Of note, there is only one block available for testing of invasive   esophageal adenocarcinoma tumor cells.  It was already used for HER2   IHC and HER2 FISH testing with the results issued in the addendums.     IMAGING DATA:    Reviewed  CT chest abdomen pelvis 10/20/2021  Impression   1.  Stable exam of the chest, abdomen and pelvis without evidence for   metastatic disease.       2.  The esophagus is decompressed.  Mucosal enhancement in the distal   esophagus may be due to underlying varices.  Underlying inflammation or mass   is considered less likely given the stability of this finding and recent   negative PET-CT.       3.  Morphologic findings of cirrhosis and portal hypertension again   demonstrated.  No focal liver lesion identified.  Trace abdominopelvic   ascites.  Small varices. ASSESSMENT:    Haven Bear is a 57 y.o. male with history of hepatitis C, liver cirrhosis, history of alcohol abuse, with esophageal cancer. I reviewed the NCCN guidelines and goals of care. Clinically appears to have T2 N0 M0  Stag II, disease. Started on preoperative  concurrent chemoradiation  Now he has completed chemoradiation  A PET scan on 7/21/21 after chemoradiation showed no metabolic evidence of active neoplasm. Induced at [facility] thoracic surgery and hepatology clinic recommended surveillance. During today's visit, the patient and the family had a number of reasonable questions which were answered to their satisfaction. They verbalized understanding of the information provided and they agreed to proceed as outlined above.      PLAN:   I reviewed his recent laboratory studies, imaging studies, biopsy results, diagnosis and further recommendations   His CT scan showed no evidence of recurrence but did show some esophageal thickening   He did have recent endoscopy with gastroenterology but biopsy was not performed due to risk of bleeding   I advised him to follow-up with gastroenterology to consider follow-up endoscopy for biopsy to rule out any recurrent disease   Return to clinic in 3 months with labs and CT scan prior or earlier if needed       Garrison Flowers MD  Hematologist/Medical Oncologist    On this date 5/5/22  I have spent 40 minutes reviewing previous notes, test results and face to face with the patient discussing the diagnosis and importance of compliance with the treatment plan. Greater than 50% of that time was spent face-to-face with the patient in counseling and coordinating her care. This note is created with the assistance of a speech recognition program.  While intending to generate a document that actually reflects the content of the visit, the document can still have some errors including those of syntax and sound a like substitutions which may escape proof reading. It such instances, actual meaning can be extrapolated by contextual diversion.

## 2022-05-05 NOTE — TELEPHONE ENCOUNTER
avs from 5/5/22     RTC in 3 months with labs and scan    rv on 8/9/22 @ 1:15pm  Ct scheduled for 7/25/22 @ 1:45  with lab ( cbc,cancer antigen 19-9, cmp)     Pt was given AVS and appt schedule    Electronically signed by Mikel Lee on 5/5/2022 at 12:53 PM

## 2022-05-09 ENCOUNTER — HOSPITAL ENCOUNTER (OUTPATIENT)
Age: 62
Discharge: HOME | End: 2022-05-09
Payer: MEDICARE

## 2022-05-09 DIAGNOSIS — D64.9 ANEMIA, UNSPECIFIED TYPE: ICD-10-CM

## 2022-05-09 LAB
HCT VFR BLD CALC: 32.8 % (ref 41–53)
HEMOGLOBIN: 10.7 G/DL (ref 13.5–17.5)

## 2022-05-09 PROCEDURE — 85018 HEMOGLOBIN: CPT

## 2022-05-09 PROCEDURE — 85014 HEMATOCRIT: CPT

## 2022-05-09 PROCEDURE — 36415 COLL VENOUS BLD VENIPUNCTURE: CPT

## 2022-05-11 ENCOUNTER — HOSPITAL ENCOUNTER (OUTPATIENT)
Age: 62
Discharge: HOME | End: 2022-05-11
Payer: MEDICARE

## 2022-05-11 ENCOUNTER — TELEPHONE (OUTPATIENT)
Dept: GASTROENTEROLOGY | Age: 62
End: 2022-05-11

## 2022-05-11 DIAGNOSIS — K70.31 ASCITES DUE TO ALCOHOLIC CIRRHOSIS (HCC): ICD-10-CM

## 2022-05-11 DIAGNOSIS — K70.31 ASCITES DUE TO ALCOHOLIC CIRRHOSIS (HCC): Primary | ICD-10-CM

## 2022-05-11 LAB
ALBUMIN SERPL-MCNC: 3.5 G/DL (ref 3.5–5.2)
ALP BLD-CCNC: 240 U/L (ref 40–129)
ALT SERPL-CCNC: 38 U/L (ref 5–41)
ANION GAP SERPL CALCULATED.3IONS-SCNC: 11 MMOL/L (ref 9–17)
AST SERPL-CCNC: 50 U/L
BILIRUB SERPL-MCNC: 1.55 MG/DL (ref 0.3–1.2)
BUN BLDV-MCNC: 7 MG/DL (ref 8–23)
CALCIUM SERPL-MCNC: 8.7 MG/DL (ref 8.6–10.4)
CHLORIDE BLD-SCNC: 92 MMOL/L (ref 98–107)
CO2: 27 MMOL/L (ref 20–31)
CREAT SERPL-MCNC: 0.73 MG/DL (ref 0.7–1.2)
GFR AFRICAN AMERICAN: >60 ML/MIN
GFR NON-AFRICAN AMERICAN: >60 ML/MIN
GFR SERPL CREATININE-BSD FRML MDRD: ABNORMAL ML/MIN/{1.73_M2}
GLUCOSE BLD-MCNC: 85 MG/DL (ref 70–99)
HCT VFR BLD CALC: 32.2 % (ref 41–53)
HEMOGLOBIN: 10.3 G/DL (ref 13.5–17.5)
MCH RBC QN AUTO: 28.8 PG (ref 26–34)
MCHC RBC AUTO-ENTMCNC: 32 G/DL (ref 31–37)
MCV RBC AUTO: 90 FL (ref 80–100)
PDW BLD-RTO: 18.9 % (ref 11.5–14.9)
PLATELET # BLD: 258 K/UL (ref 150–450)
PMV BLD AUTO: 6.6 FL (ref 6–12)
POTASSIUM SERPL-SCNC: 3.7 MMOL/L (ref 3.7–5.3)
RBC # BLD: 3.58 M/UL (ref 4.5–5.9)
SODIUM BLD-SCNC: 130 MMOL/L (ref 135–144)
TOTAL PROTEIN: 5.9 G/DL (ref 6.4–8.3)
WBC # BLD: 11.2 K/UL (ref 3.5–11)

## 2022-05-11 PROCEDURE — 80053 COMPREHEN METABOLIC PANEL: CPT

## 2022-05-11 PROCEDURE — 85027 COMPLETE CBC AUTOMATED: CPT

## 2022-05-11 PROCEDURE — 36415 COLL VENOUS BLD VENIPUNCTURE: CPT

## 2022-05-11 NOTE — TELEPHONE ENCOUNTER
Labs put in by writer per Dr. Gabino Rich. Patient's 5/11/22 appointment was rescheduled for 5/12/22. Writer will call the patient's spouse to give time.

## 2022-05-12 ENCOUNTER — OFFICE VISIT (OUTPATIENT)
Dept: GASTROENTEROLOGY | Age: 62
End: 2022-05-12
Payer: MEDICARE

## 2022-05-12 VITALS
OXYGEN SATURATION: 100 % | WEIGHT: 203.3 LBS | BODY MASS INDEX: 29.11 KG/M2 | HEIGHT: 70 IN | DIASTOLIC BLOOD PRESSURE: 76 MMHG | HEART RATE: 66 BPM | SYSTOLIC BLOOD PRESSURE: 113 MMHG

## 2022-05-12 DIAGNOSIS — Z86.19 HISTORY OF HEPATITIS C: ICD-10-CM

## 2022-05-12 DIAGNOSIS — K70.31 ASCITES DUE TO ALCOHOLIC CIRRHOSIS (HCC): Primary | ICD-10-CM

## 2022-05-12 DIAGNOSIS — K22.711 BARRETT'S ESOPHAGUS WITH HIGH GRADE DYSPLASIA: ICD-10-CM

## 2022-05-12 DIAGNOSIS — R18.8 CIRRHOSIS OF LIVER WITH ASCITES, UNSPECIFIED HEPATIC CIRRHOSIS TYPE (HCC): ICD-10-CM

## 2022-05-12 DIAGNOSIS — K76.9 LIVER DISEASE, CHRONIC: ICD-10-CM

## 2022-05-12 DIAGNOSIS — K74.60 CIRRHOSIS OF LIVER WITH ASCITES, UNSPECIFIED HEPATIC CIRRHOSIS TYPE (HCC): ICD-10-CM

## 2022-05-12 DIAGNOSIS — C80.1 ADENOCARCINOMA IN A POLYP (HCC): ICD-10-CM

## 2022-05-12 PROCEDURE — 99213 OFFICE O/P EST LOW 20 MIN: CPT | Performed by: INTERNAL MEDICINE

## 2022-05-12 RX ORDER — NADOLOL 20 MG/1
TABLET ORAL
COMMUNITY
Start: 2022-05-06 | End: 2022-08-26

## 2022-05-12 ASSESSMENT — ENCOUNTER SYMPTOMS
COUGH: 0
ABDOMINAL PAIN: 1
RESPIRATORY NEGATIVE: 1
ABDOMINAL DISTENTION: 1
VOICE CHANGE: 0
BLOOD IN STOOL: 0
CONSTIPATION: 0
ANAL BLEEDING: 0
DIARRHEA: 0
NAUSEA: 0
VOMITING: 0
SORE THROAT: 0
SHORTNESS OF BREATH: 0
RECTAL PAIN: 0
CHOKING: 0
BACK PAIN: 0
ALLERGIC/IMMUNOLOGIC NEGATIVE: 1
TROUBLE SWALLOWING: 0

## 2022-05-12 NOTE — PROGRESS NOTES
GI OFFICE FOLLOW UP    INTERVAL HISTORY:   No referring provider defined for this encounter. Chief Complaint   Patient presents with    Cirrhosis     Patient is here today to f/u on labs       No diagnosis found. HISTORY OF PRESENT ILLNESS:   Patient seen for follow-up of ascites. Known to have alcoholic liver disease, portal hypertension, esophageal cancer treated with chemoradiation therapy. We also had a prolonged hospital stay recently with a GI blood loss. At present hemoglobin is stable. Renal function stable. Medications reviewed. He denies severe abdominal pain, nausea or vomiting. Has some discomfort in the abdomen because of ascites. No significant shortness of breath and able to ambulate without significant issues. No swelling of the lower extremities. No melanotic stools. Past Medical,Family, and Social History reviewed and does contribute to the patient presenting condition. Patient's PMH/PSH,SH,PSYCH Hx, MEDs, ALLERGIES, and ROS were all reviewed and updated in the appropriate sections.  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 Gael Marti, orthopedic, every 3-4 months, gets steroid injection    Lezama esophagus     BPH (benign prostatic hypertrophy)     Cholelithiasis     Cirrhosis ([address]     COVID-19 12/2020    pt reports he had a positive test while at [facility] in 2020, was asymptomatic    COVID-19 vaccine series completed 5/20/2021, 6/22/2021    Moderna 5/20/2021, 6/22/2021    DDD (degenerative disc disease), lumbar     Depression     Esophageal cancer ([address]     INVASIVE ADENOCARCINOMA ARISING IN TUBULAR ADENOMA WITH HIGH GRADE DYSPLASIA, ASSOCIATED WITH FOCAL INTESTINAL METAPLASIA     Esophageal varices ([address]     Fatty liver     GERD (gastroesophageal reflux disease)     Hep C w/o coma, chronic (HCC)     History of alcohol abuse     6-12 beers a day; quit drinking July 2016    History of blood transfusion     History of colon polyps 2016    History of tobacco abuse     [place] (hard of hearing)     Hyperlipidemia     Hypertension     Port-A-Cath in place     right upper chest    Sciatica     Spinal stenosis     Stomach ulcer     hx of    Tubular adenoma of colon 2016, 2018    Vitamin D deficiency     Wears glasses        Past Surgical History:   Procedure Laterality Date    BUNIONECTOMY      twice on right side    BUNIONECTOMY Left     CARPAL TUNNEL RELEASE Right     COLONOSCOPY      at age 35    COLONOSCOPY  10/05/2016    polyps-pathology tubular adenoma, and abnormal looking mucosa right colon-pathology-tubular adenoma    COLONOSCOPY N/A 3/30/2018    COLONOSCOPY POLYPECTOMY COLD BIOPSY performed by Prasanna Singh MD at [address] [place] [address]  03/30/2018    Small polyp in the sigmoid colon and excised with biopsy forceps--tubular adenoma    COLONOSCOPY N/A 4/16/2022    COLONOSCOPY POLYPECTOMY performed by Prasanna Singh MD at [address] [place] [address] Rd, COLON, DIAGNOSTIC      EGD    IR PORT PLACEMENT EQUAL OR GREATER THAN 5 YEARS  4/19/2021    IR PORT PLACEMENT EQUAL OR GREATER THAN 5 YEARS 4/19/2021 [facility] SPECIAL PROCEDURES    KNEE SURGERY Left     cyst removed    NASAL SEPTUM SURGERY      NERVE BLOCK Right 11/23/2020    NERVE BLOCK RIGHT CERVICAL STEROID INJECTION  C3-C6 performed by Alejandrina Puckett MD at [address] [place] [address]  01/04/16    steroid injection C7 T1    OTHER SURGICAL HISTORY  11/21/2016    Bilateral Lumbar CACHORRO L4-L5 injections    OTHER SURGICAL HISTORY  12/19/2016    lumbar steroid injection    OTHER SURGICAL HISTORY  09/28/2018    BILATERAL L5 CACHORRO (N/A Back)    OTHER SURGICAL HISTORY Right 11/23/2020    cervical injection    PAIN MANAGEMENT PROCEDURE Left 7/9/2020 EPIDURAL STEROID INJECTION LEFT L4 L5 performed by Dorcas Puri MD at [address] [place] [address] Left 7/20/2020    LEFT L4 L5 EPIDURAL STEROID INJECTION performed by Dorcas Puri MD at [address] [place] [address] Bilateral 8/17/2020    LUMBAR FACET BILATERAL L2-L5 performed by Dorcas Puri MD at [address] [place] [address] Bilateral 12/7/2020    NERVE BLOCK BILATERAL LUMBAR MEDIAL BRANCH L2-L5 performed by Dorcas Puri MD at [address] AA&/STRD TFRML EPI LUMBAR/SACRAL 1 LEVEL Bilateral 9/6/2018    BILATERAL L5 CACHORRO performed by Dorcas Puri MD at [address] AA&/STRD TFRML EPI LUMBAR/SACRAL 1 LEVEL N/A 9/28/2018    BILATERAL L5 CACHORRO performed by Dorcas Puri MD at [address] [facility] N/CARPAL TUNNEL SURG Right 8/29/2017    CARPAL TUNNEL RELEASE RIGHT performed by Susana Anders MD at [address] [place] [address]/CARPAL TUNNEL SURG Left 10/31/2017    CARPAL TUNNEL RELEASE performed by Susana Anders MD at [address] [place] [address] 12/29/2020    EGD BIOPSY performed by Zohra Diego MD at [address] [place] [address] N/A 2/2/2021    EGD BIOPSY and spot marking performed by Josue Reyes MD at [address] [place] [address] 2/12/2021    ENDOSCOPIC ULTRASOUND, EGD performed by Chai Sanabria MD at [address] [place] [address]  2/12/2021    EGD DIAGNOSTIC ONLY performed by Chai Sanabria MD at [address] [place] [address] N/A 8/31/2021    EGD BIOPSY performed by Josue Reyes MD at [address] [place] [address] 1/21/2022    EGD BIOPSY performed by Josue Reyes MD at [address] [place] [address] 4/15/2022    EGD ESOPHAGOGASTRODUODENOSCOPY performed by Zohra Diego MD at [address] [place] [address] [place]:    Current Outpatient Medications:     nadolol (CORGARD) 20 MG tablet, take 1 tablet by mouth once daily, Disp: , Rfl:     FLUoxetine (PROZAC) 20 MG capsule, Take 1 capsule by mouth daily, Disp: 30 capsule, Rfl: 3    ondansetron (ZOFRAN-ODT) 4 MG disintegrating tablet, Take 1 tablet by mouth every 8 hours as needed for Nausea or Vomiting, Disp: 10 tablet, Rfl: 0    atorvastatin (LIPITOR) 20 MG tablet, Take 1 tablet by mouth nightly, Disp: 30 tablet, Rfl: 3    furosemide (LASIX) 40 MG tablet, Take 0.5 tablets by mouth 2 times daily, Disp: 60 tablet, Rfl: 3    spironolactone (ALDACTONE) 50 MG tablet, Take 1 tablet by mouth every evening, Disp: 30 tablet, Rfl: 3    midodrine (PROAMATINE) 5 MG tablet, Take 1 tablet by mouth 3 times daily as needed (SBP <110), Disp: 90 tablet, Rfl: 3    Probiotic Acidophilus (FLORANEX) TABS, Take 1 tablet by mouth 2 times daily, Disp: 60 tablet, Rfl: 0    lactulose (CHRONULAC) 10 GM/15ML solution, Take 30 mLs by mouth 3 times daily, Disp: 473 mL, Rfl: 1    ferrous sulfate (IRON 325) 325 (65 Fe) MG tablet, Take 1 tablet by mouth 2 times daily, Disp: 60 tablet, Rfl: 5    omeprazole (PRILOSEC) 40 MG delayed release capsule, take 1 capsule by mouth EVERY MORNING BEFORE BREAKFAST, Disp: 30 capsule, Rfl: 2    ALLERGIES:   Allergies   Allergen Reactions    Bee Pollen Other (See Comments)     Runny nose, watery eyes    Pollen Extract      Runny nose, watery eyes       FAMILY HISTORY:       Problem Relation Age of Onset    Cancer Mother         pancreatic    Cancer Father         bone    Diabetes Sister     Asthma Brother          SOCIAL HISTORY:   Social History     Socioeconomic History    Marital status: Single     Spouse name: Not on file    Number of children: Not on file    Years of education: Not on file    Highest education level: Not on file   Occupational History    Not on file   Tobacco Use    Smoking status: Former Smoker     Packs/day: 1.00     Years: 45.00     Pack years: 45.00     Quit date: 1/17/2017 Years since quittin.3    Smokeless tobacco: Never Used   Vaping Use    Vaping Use: Never used   Substance and Sexual Activity    Alcohol use: Not Currently     Comment: quit 2019    Drug use: Not Currently     Frequency: 1.0 times per week     Comment: cocaine,  stopped spring 2016    Sexual activity: Yes     Partners: Female   Other Topics Concern    Not on file   Social History Narrative     in the past, retired     Social Determinants of Health     Financial Resource Strain: [address] Difficulty of Paying Living Expenses: Not hard at all   Food Insecurity: No Food Insecurity    Worried About [facility] in the Last Year: Never true   [address] in the Last Year: Never true   Transportation Needs:     Lack of Transportation (Medical): Not on file    Lack of Transportation (Non-Medical): Not on file   Physical Activity:     Days of Exercise per Week: Not on file    Minutes of Exercise per Session: Not on file   Stress:     Feeling of Stress : Not on file   Social Connections:     Frequency of Communication with Friends and Family: Not on file    Frequency of Social Gatherings with Friends and Family: Not on file    Attends [employer] Services: Not on file    Active Member of [address] [place] [address] or Organizations: Not on file    Attends Club or Organization Meetings: Not on file    Marital Status: Not on file   Intimate Partner Violence:     Fear of Current or Ex-Partner: Not on file    Emotionally Abused: Not on file    Physically Abused: Not on file    Sexually Abused: Not on file   Housing Stability:     Unable to Pay for Housing in the Last Year: Not on file    Number of [place] in the Last Year: Not on file    Unstable Housing in the Last Year: Not on file         REVIEW OF SYSTEMS:         Review of Systems   Constitutional: Positive for fatigue. Negative for appetite change and unexpected weight change. HENT: Positive for hearing loss (rt ear).  Negative for sore throat, trouble swallowing and voice change. Eyes: Positive for visual disturbance (wears glasses). Respiratory: Negative. Negative for cough, choking and shortness of breath. Cardiovascular: Negative. Negative for chest pain and leg swelling. Gastrointestinal: Positive for abdominal distention and abdominal pain. Negative for anal bleeding, blood in stool, constipation, diarrhea, nausea, rectal pain and vomiting. Denies   Endocrine: Negative. Negative for polydipsia, polyphagia and polyuria. Genitourinary: Negative for difficulty urinating, frequency, hematuria and urgency. Musculoskeletal: Positive for gait problem (in wheel chair). Negative for back pain, joint swelling and myalgias. Skin: Negative. Allergic/Immunologic: Negative. Negative for environmental allergies, food allergies and immunocompromised state. Neurological: Negative for dizziness, tremors, weakness, light-headedness, numbness and headaches. Hematological: Bruises/bleeds easily. Psychiatric/Behavioral: Positive for sleep disturbance. The patient is nervous/anxious. PHYSICAL EXAMINATION:     Vital signs reviewed per the nursing documentation. There were no vitals taken for this visit. There is no height or weight on file to calculate BMI. Physical Exam  Vitals reviewed. Constitutional:       Appearance: Normal appearance. HENT:      Head: Normocephalic and atraumatic. Eyes:      Extraocular Movements: Extraocular movements intact. Conjunctiva/sclera: Conjunctivae normal.   Cardiovascular:      Rate and Rhythm: Normal rate and regular rhythm. Heart sounds: Normal heart sounds. Pulmonary:      Effort: Pulmonary effort is normal.      Breath sounds: Normal breath sounds. Abdominal:      General: Bowel sounds are normal. There is no distension. Palpations: Abdomen is soft. There is no mass. Tenderness: There is no abdominal tenderness. There is no guarding. Hernia: No hernia is present. Comments: Has ascites moderate amount. Skin:     General: Skin is warm and dry. Neurological:      General: No focal deficit present. Mental Status: He is alert and oriented to person, place, and time. Psychiatric:         Mood and Affect: Mood normal.         Behavior: Behavior normal.           LABORATORY DATA: Reviewed  Lab Results   Component Value Date    WBC 11.2 (H) 05/11/2022    HGB 10.3 (L) 05/11/2022    HCT 32.2 (L) 05/11/2022    MCV 90.0 05/11/2022     05/11/2022     (L) 05/11/2022    K 3.7 05/11/2022    CL 92 (L) 05/11/2022    CO2 27 05/11/2022    BUN 7 (L) 05/11/2022    CREATININE 0.73 05/11/2022    LABPROT 7.7 04/19/2012    LABALBU 3.5 05/11/2022    BILITOT 1.55 (H) 05/11/2022    ALKPHOS 240 (H) 05/11/2022    AST 50 (H) 05/11/2022    ALT 38 05/11/2022    INR 1.2 04/26/2022         Lab Results   Component Value Date    RBC 3.58 (L) 05/11/2022    HGB 10.3 (L) 05/11/2022    MCV 90.0 05/11/2022    MCH 28.8 05/11/2022    MCHC 32.0 05/11/2022    RDW 18.9 (H) 05/11/2022    MPV 6.6 05/11/2022    BASOPCT 1 04/26/2022    LYMPHSABS 0.67 (L) 04/26/2022    MONOSABS 1.01 04/26/2022    NEUTROABS 6.39 04/26/2022    EOSABS 0.25 04/26/2022    BASOSABS 0.08 04/26/2022         DIAGNOSTIC TESTING:     US LIVER    Result Date: 4/19/2022  EXAMINATION: RIGHT UPPER QUADRANT ULTRASOUND 4/19/2022 9:24 am COMPARISON: None. HISTORY: ORDERING SYSTEM PROVIDED HISTORY: evaluate portal/hepatic vein, r/o PVT TECHNOLOGIST PROVIDED HISTORY: evaluate portal/hepatic vein, r/o PVT FINDINGS: LIVER:  Nodular margins of the liver would suggest hepatic cirrhosis. No focal lesion. Liver 15.6 cm in length. Hepatopetal flow portal vein. BILIARY SYSTEM:  Gallstones and sludge within the gallbladder. No wall thickening. Negative sonographic Silva's sign. Common bile duct is within normal limits measuring 4.6 mm. OTHER: Moderate ascites visualized right upper quadrant.      1. Hepatic cirrhosis. No focal mass. Hepatopetal flow portal vein. 2. Gallstones and sludge within the gallbladder. 3. Ascites RECOMMENDATIONS: Unavailable     XR CHEST PORTABLE    Result Date: 4/26/2022  EXAMINATION: ONE XRAY VIEW OF THE CHEST 4/26/2022 6:18 pm COMPARISON: 04/15/2022 HISTORY: ORDERING SYSTEM PROVIDED HISTORY: SOB TECHNOLOGIST PROVIDED HISTORY: SOB Reason for Exam: shortness of breath FINDINGS: Right-sided central venous catheter remains in place. The lungs are without acute focal process. There is no effusion or pneumothorax. The cardiomediastinal silhouette is stable. The osseous structures are stable. No acute process. XR CHEST PORTABLE    Result Date: 4/15/2022  EXAMINATION: ONE XRAY VIEW OF THE CHEST 4/15/2022 7:59 am COMPARISON: 02/02/2022, 12/21/2021 HISTORY: ORDERING SYSTEM PROVIDED HISTORY: Dyspnea TECHNOLOGIST PROVIDED HISTORY: Dyspnea Reason for Exam: Dyspnea FINDINGS: Right chest port terminates in the superior vena cava. Mild pulmonary vascular congestion. No focal pulmonary opacities. Calcified granulomata and calcified mediastinal nodes from prior granulomatous infection. Cardiomegaly which is similar to 4 months prior. No acute bony findings. Mild pulmonary vascular congestion. Cardiomegaly. IR US GUIDED PARACENTESIS    Result Date: 4/27/2022  PROCEDURE: PARACENTESIS WITHOUT IMAGE GUIDANCE US ABDOMEN LIMITED 4/27/2022 HISTORY: ORDERING SYSTEM PROVIDED HISTORY: ascites TECHNOLOGIST PROVIDED HISTORY: ascites Alcoholic cirrhosis TECHNIQUE: Informed consent was obtained after a detailed explanation of the procedure including risks, benefits, and alternatives. Universal protocol was followed. A limited ultrasound of the abdomen was performed. The right upper abdomen was prepped and draped in sterile fashion and local anesthesia was achieved with lidocaine. A 5 Scottish needle sheath was advanced into ascites and paracentesis was performed.   The patient tolerated the procedure well. Albumin a 5 g IV was administered post procedure. FINDINGS: Limited ultrasound of the abdomen demonstrates ascites. A total of 11 L was removed. Significant reduction ascitic volume demonstrated on postprocedure US imaging. Successful therapeutic paracentesis. IR US GUIDED PARACENTESIS    Result Date: 4/18/2022  PROCEDURE: PARACENTESIS WITH IMAGE GUIDANCE US ABDOMEN LIMITED 4/18/2022 HISTORY: ORDERING SYSTEM PROVIDED HISTORY: worsening acites and SOB TECHNOLOGIST PROVIDED HISTORY: worsening acites and SOB Please remove no more than 5 L TECHNIQUE: Informed consent was obtained after a detailed explanation of the procedure including risks, benefits, and alternatives. Universal protocol was followed. A limited ultrasound of the abdomen was performed and shows large amount of ascites. The right abdomen was prepped and draped in sterile fashion and local anesthesia was achieved with lidocaine. A 5 Bahraini needle sheath was advanced into ascites using realtime ultrasound guidance and paracentesis was performed. The patient tolerated the procedure well. EBL: None FINDINGS: Limited ultrasound of the abdomen demonstrates ascites. A total of 4.8 L of clear yellow fluid was removed. Successful ultrasound guided paracentesis. IR US GUIDED PARACENTESIS    Result Date: 4/14/2022  PROCEDURE: PARACENTESIS WITH IMAGE GUIDANCE US ABDOMEN LIMITED 4/14/2022 HISTORY: ORDERING SYSTEM PROVIDED HISTORY: ascites remove 5 liters only TECHNOLOGIST PROVIDED HISTORY: ascites remove 5 liters only TECHNIQUE: Informed consent was obtained after a detailed explanation of the procedure including risks, benefits, and alternatives. Universal protocol was followed. A limited ultrasound of the abdomen was performed and shows a moderate to large amount of ascites. The right abdomen was prepped and draped in sterile fashion and local anesthesia was achieved with lidocaine.  A 5 Bahraini needle sheath was advanced into ascites using realtime ultrasound guidance and paracentesis was performed. The patient tolerated the procedure well. EBL: None FINDINGS: Limited ultrasound of the abdomen demonstrates ascites. A total of 5 L of slightly turbid yellow colored fluid was removed. Additional fluid remains on completion. Successful ultrasound-guided paracentesis. FLUORO FOR SURGICAL PROCEDURES    Result Date: 5/4/2022  Radiology result is complete; follow up with provider / physician office for radiology results     CT CHEST ABDOMEN PELVIS W CONTRAST    Result Date: 4/25/2022  EXAMINATION: CT OF THE CHEST, ABDOMEN, AND PELVIS WITH CONTRAST 4/25/2022 9:59 am TECHNIQUE: CT of the chest, abdomen and pelvis was performed with the administration of intravenous contrast. Multiplanar reformatted images are provided for review. Dose modulation, iterative reconstruction, and/or weight based adjustment of the mA/kV was utilized to reduce the radiation dose to as low as reasonably achievable. COMPARISON: 01/31/2022 HISTORY: ORDERING SYSTEM PROVIDED HISTORY: Esophageal adenocarcinoma ([facility] TECHNOLOGIST PROVIDED HISTORY: follow up Reason for Exam: follow up esophageal cancer Additional signs and symptoms: pt sts increasing abdominal pain and shortness of breath. Sts bloating. FINDINGS: Chest: Mediastinum: The cardiac size is normal. Aortic and coronary artery calcifications. No significant mediastinal lymphadenopathy. .  Thyroid gland grossly normal in visualized portions. Patulous esophagus with intraluminal contrast.  Mild thickening of the esophagus most pronounced in the distal 3rd is noted. The appears to be a sliding hiatal hernia as well. Some periesophageal varices are noted similar to prior. Lungs/pleura: Calcified lingular nodule. No follow-up required. No evidence for metastatic nodules. There is mild centrilobular emphysema. Central airways unremarkable. Soft Tissues/Bones: Right-sided infusion port terminates in SVC.   No acute bony abnormalities. No aggressive lytic or blastic lesions. No significant superficial soft tissue lesion. Abdomen/Pelvis: Organs: Nodular shrunken cirrhotic liver in keeping with history of cirrhosis. No focal liver lesion. No splenomegaly. Pancreas, adrenal glands, kidneys show no significant abnormalities. Cholelithiasis noted. GI/Bowel: Small sliding hiatal hernia is suggested. Small bowel loops show no acute process or focal lesions. Unremarkable colon. No evidence for bowel obstruction. Pelvis: Urinary bladder is unremarkable. Peritoneum/Retroperitoneum: Large volume ascites increased from prior. Periesophageal varices as noted above. Normal enhancement of portal and splenic vein. No evidence for thrombosis. 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 There is no significant lymphadenopathy. Bones/Soft Tissues: No acute bony abnormalities. Diffuse degenerative changes in the spine. No aggressive lytic or blastic lesions. No significant superficial soft tissue lesion. 1. No clear evidence for metastatic disease. 2. Mild diffuse thickening of the esophagus most pronounced in the lower portion where there also appears to be a small sliding hiatal hernia. Consistent with history of esophageal cancer. 3. Cirrhotic liver with a few periesophageal varices. 4. Large volume ascites increased from prior. 5. Cholelithiasis unchanged. Assessment  No diagnosis found. Plan  Patient renal function stable. Patient does have ascites but not getting worse. At present he is taking spironolactone 100 mg twice a day and Lasix 40 mg twice a day. Both patient and wife stated that he is following salt restricted diet. Advised to increase spironolactone 200 mg 3 times a day, Lasix 40 mg 3 times a day. Repeat labs in 1 week. Discussed with her regarding complications of frequent paracentesis.   After prolonged discussion he understood and agreed to follow with the medical therapy for some more time and see whether the ascites gets better. Advised labs in 1 week and to see me in the next 2 weeks. Thank you for allowing me to participate in the care of Mr. Emmie Dickerson. For any further questions please do not hesitate to contact me. I have reviewed and agree with the ROS entered by the MA/LPN. Note is dictated utilizing voice recognition software. Unfortunately this leads to occasional typographical errors.  Please contact our office if you have any questions        Jed Colon MD,FACP, [facility]  Board Certified in Gastroenterology and [address] [place] [address] Gastroenterology  Office #: [phone]

## 2022-05-13 RX ORDER — NADOLOL 20 MG/1
20 TABLET ORAL DAILY
Qty: 30 TABLET | Refills: 3 | OUTPATIENT
Start: 2022-05-13

## 2022-05-16 ENCOUNTER — OFFICE VISIT (OUTPATIENT)
Dept: PAIN MANAGEMENT | Age: 62
End: 2022-05-16
Payer: MEDICARE

## 2022-05-16 VITALS
OXYGEN SATURATION: 100 % | DIASTOLIC BLOOD PRESSURE: 80 MMHG | SYSTOLIC BLOOD PRESSURE: 126 MMHG | HEART RATE: 77 BPM | WEIGHT: 200 LBS | HEIGHT: 70 IN | BODY MASS INDEX: 28.63 KG/M2

## 2022-05-16 DIAGNOSIS — M51.36 DDD (DEGENERATIVE DISC DISEASE), LUMBAR: ICD-10-CM

## 2022-05-16 DIAGNOSIS — M54.16 LUMBAR RADICULITIS: Primary | Chronic | ICD-10-CM

## 2022-05-16 DIAGNOSIS — Z79.891 CHRONIC USE OF OPIATE FOR THERAPEUTIC PURPOSE: ICD-10-CM

## 2022-05-16 DIAGNOSIS — M48.062 SPINAL STENOSIS OF LUMBAR REGION WITH NEUROGENIC CLAUDICATION: ICD-10-CM

## 2022-05-16 PROCEDURE — 99213 OFFICE O/P EST LOW 20 MIN: CPT | Performed by: NURSE PRACTITIONER

## 2022-05-16 ASSESSMENT — ENCOUNTER SYMPTOMS
WHEEZING: 0
SHORTNESS OF BREATH: 0
VOMITING: 0
CONSTIPATION: 0
COUGH: 0
BACK PAIN: 0
DIARRHEA: 0
NAUSEA: 0

## 2022-05-16 NOTE — PROGRESS NOTES
Chief Complaint   Patient presents with    Back Pain    Follow Up After Procedure         PMH     54-year-old man with history of alcoholic cirrhosis, chronic anemia, history of esophageal cancer. recent admission for SOB -Hgb 3.6 following with GI and Hem-Onc     back pain  chronic onset many years ago located in the lower lumbar area  Back pain was constant with severe radiation down both legs with standing and walking  Last MRI was in 2021 that showed multilevel spinal stenosis and foraminal narrowing at L4 severe left-sided foraminal narrowing with moderate right side foraminal narrowing  Had surgical evaluation couple of years ago and was not considered a surgical candidate considering severe comorbidities. Has appt later this month with [id number] [facility] for 2nd surgical opinion  Here today after L4 L5 epidural injection done 5/4/2022 and reports 100% relief      HPI:   Back Pain  This is a chronic problem. The problem occurs intermittently. The problem has been gradually improving since onset. The pain is at a severity of 0/10. The patient is experiencing no pain. Pertinent negatives include no chest pain, fever, headaches or numbness. Dx:  S/P:      Outcome   Any improvement of activity? Yes   Any side effects (appetite,leg cramping,facial fleshing):no   Increase of pain:  No  Pain score Today:  0  % of pain relief: 100%   Pain diary (medial branch block): No    Lab Results   Component Value Date    LABA1C 4.2 08/19/2021     Lab Results   Component Value Date    EAG 74 08/19/2021         Periodic Controlled Substance Monitoring: Possible medication side effects, risk of tolerance/dependence & alternative treatments discussed. ,No signs of potential drug abuse or diversion identified. ,Assessed functional status. ,Obtaining appropriate analgesic effect of treatment.  Diego Bernal, APRN - CNP)      Past Medical History:   Diagnosis Date    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     Anxiety     Arthritis  Back pain, chronic     dr. Nelly Wei, orthopedic, every 3-4 months, gets steroid injection    Lezama esophagus     BPH (benign prostatic hypertrophy)     Cholelithiasis     Cirrhosis ([facility]     COVID-19 12/2020    pt reports he had a positive test while at [facility] in 2020, was asymptomatic    COVID-19 vaccine series completed 5/20/2021, 6/22/2021    Moderna 5/20/2021, 6/22/2021    DDD (degenerative disc disease), lumbar     Depression     Esophageal cancer ([facility]     INVASIVE ADENOCARCINOMA ARISING IN TUBULAR ADENOMA WITH HIGH GRADE DYSPLASIA, ASSOCIATED WITH FOCAL INTESTINAL METAPLASIA     Esophageal varices ([address]     Fatty liver     GERD (gastroesophageal reflux disease)     Hep C w/o coma, chronic ([address]     History of alcohol abuse     6-12 beers a day; quit drinking July 2016    History of blood transfusion     History of colon polyps 2016    History of tobacco abuse     [place]     Hyperlipidemia     Hypertension     Port-A-Cath in place     right upper chest    Sciatica     Spinal stenosis     Stomach ulcer     hx of    Tubular adenoma of colon 2016, 2018    Vitamin D deficiency     Wears glasses        Past Surgical History:   Procedure Laterality Date    BUNIONECTOMY      twice on right side    BUNIONECTOMY Left     CARPAL TUNNEL RELEASE Right     COLONOSCOPY      at age 35    COLONOSCOPY  10/05/2016    polyps-pathology tubular adenoma, and abnormal looking mucosa right colon-pathology-tubular adenoma    COLONOSCOPY N/A 3/30/2018    COLONOSCOPY POLYPECTOMY COLD BIOPSY performed by Rahul Owen MD at [facility]  03/30/2018    Small polyp in the sigmoid colon and excised with biopsy forceps--tubular adenoma    COLONOSCOPY N/A 4/16/2022    COLONOSCOPY POLYPECTOMY performed by Rahul Owen MD at [address] [place] [address], DIAGNOSTIC      EGD    IR PORT PLACEMENT EQUAL OR GREATER THAN 5 YEARS  4/19/2021    IR PORT PLACEMENT EQUAL OR GREATER THAN 5 YEARS 4/19/2021 [facility] SPECIAL PROCEDURES    KNEE SURGERY Left     cyst removed    NASAL SEPTUM SURGERY      NERVE BLOCK Right 11/23/2020    NERVE BLOCK RIGHT CERVICAL STEROID INJECTION  C3-C6 performed by Tad Callejas MD at [facility]  01/04/16    steroid injection C7 T1    OTHER SURGICAL HISTORY  11/21/2016    Bilateral Lumbar CACHORRO L4-L5 injections    OTHER SURGICAL HISTORY  12/19/2016    lumbar steroid injection    OTHER SURGICAL HISTORY  09/28/2018    BILATERAL L5 CACHORRO (N/A Back)    OTHER SURGICAL HISTORY Right 11/23/2020    cervical injection    PAIN MANAGEMENT PROCEDURE Left 7/9/2020    EPIDURAL STEROID INJECTION LEFT L4 L5 performed by Tad Callejas MD at [address] Left 7/20/2020    LEFT L4 L5 EPIDURAL STEROID INJECTION performed by Tad Callejas MD at [address] Bilateral 8/17/2020    LUMBAR FACET BILATERAL L2-L5 performed by Tad Callejas MD at [address] Bilateral 12/7/2020    NERVE BLOCK BILATERAL LUMBAR MEDIAL BRANCH L2-L5 performed by Tad Callejas MD at [address] AA&/STRD TFRML EPI LUMBAR/SACRAL 1 LEVEL Bilateral 9/6/2018    BILATERAL L5 CACHORRO performed by Tad Callejas MD at [address] AA&/STRD TFRML EPI LUMBAR/SACRAL 1 LEVEL N/A 9/28/2018    BILATERAL L5 CACHORRO performed by Tad Callejas MD at [facility] N/CARPAL TUNNEL SURG Right 8/29/2017    CARPAL TUNNEL RELEASE RIGHT performed by Josiah Taylor MD at [facility] N/CARPAL TUNNEL SURG Left 10/31/2017    CARPAL TUNNEL RELEASE performed by Josiah Taylor MD at [facility] 12/29/2020    EGD BIOPSY performed by Davis Pérez MD at [facility] 2/2/2021    EGD BIOPSY and spot marking performed by Raymundo Cross MD at [facility] N/A 2/12/2021    ENDOSCOPIC ULTRASOUND, EGD performed by Erinn Burgos MD at [facility]  2/12/2021    EGD DIAGNOSTIC ONLY performed by Erinn Burgos MD at [facility]    UPPER GASTROINTESTINAL ENDOSCOPY N/A 8/31/2021    EGD BIOPSY performed by Isaiah Elliott MD at [facility] N/A 1/21/2022    EGD BIOPSY performed by Isaiah Elliott MD at [facility] N/A 4/15/2022    EGD ESOPHAGOGASTRODUODENOSCOPY performed by Josue Garcia MD at [address] [place] [address]   Allergen Reactions    Bee Pollen Other (See Comments)     Runny nose, watery eyes    Pollen Extract      Runny nose, watery eyes         Current Outpatient Medications:     nadolol (CORGARD) 20 MG tablet, take 1 tablet by mouth once daily, Disp: , Rfl:     FLUoxetine (PROZAC) 20 MG capsule, Take 1 capsule by mouth daily, Disp: 30 capsule, Rfl: 3    ondansetron (ZOFRAN-ODT) 4 MG disintegrating tablet, Take 1 tablet by mouth every 8 hours as needed for Nausea or Vomiting, Disp: 10 tablet, Rfl: 0    atorvastatin (LIPITOR) 20 MG tablet, Take 1 tablet by mouth nightly, Disp: 30 tablet, Rfl: 3    furosemide (LASIX) 40 MG tablet, Take 0.5 tablets by mouth 2 times daily, Disp: 60 tablet, Rfl: 3    spironolactone (ALDACTONE) 50 MG tablet, Take 1 tablet by mouth every evening, Disp: 30 tablet, Rfl: 3    midodrine (PROAMATINE) 5 MG tablet, Take 1 tablet by mouth 3 times daily as needed (SBP <110), Disp: 90 tablet, Rfl: 3    Probiotic Acidophilus (FLORANEX) TABS, Take 1 tablet by mouth 2 times daily, Disp: 60 tablet, Rfl: 0    lactulose (CHRONULAC) 10 GM/15ML solution, Take 30 mLs by mouth 3 times daily, Disp: 473 mL, Rfl: 1    ferrous sulfate (IRON 325) 325 (65 Fe) MG tablet, Take 1 tablet by mouth 2 times daily, Disp: 60 tablet, Rfl: 5    omeprazole (PRILOSEC) 40 MG delayed release capsule, take 1 capsule by mouth EVERY MORNING BEFORE BREAKFAST, Disp: 30 capsule, Rfl: 2    Family History   Problem Relation Age of Onset   Combs Cancer Mother         pancreatic    Cancer Father         bone    Diabetes Sister     Asthma Brother        Social History     Socioeconomic History    Marital status: Single     Spouse name: Not on file    Number of children: Not on file    Years of education: Not on file    Highest education level: Not on file   Occupational History    Not on file   Tobacco Use    Smoking status: Former Smoker     Packs/day: 1.00     Years: 45.00     Pack years: 45.00     Quit date: 2017     Years since quittin.3    Smokeless tobacco: Never Used   Vaping Use    Vaping Use: Never used   Substance and Sexual Activity    Alcohol use: Not Currently     Comment: quit     Drug use: Not Currently     Frequency: 1.0 times per week     Comment: cocaine,  stopped spring 2016    Sexual activity: Yes     Partners: Female   Other Topics Concern    Not on file   Social History Narrative     in the past, retired     Social Determinants of Health     Financial Resource Strain: Low Risk     Difficulty of Paying Living Expenses: Not hard at all   Food Insecurity: No Food Insecurity    Worried About [address] in the Last Year: Never true   [address] in the Last Year: Never true   Transportation Needs:     Lack of Transportation (Medical): Not on file    Lack of Transportation (Non-Medical):  Not on file   Physical Activity:     Days of Exercise per Week: Not on file    Minutes of Exercise per Session: Not on file   Stress:     Feeling of Stress : Not on file   Social Connections:     Frequency of Communication with Friends and Family: Not on file    Frequency of Social Gatherings with Friends and Family: Not on file    Attends Advent Services: Not on file    Active Member of Clubs or Organizations: Not on file    Attends Club or Organization Meetings: Not on file    Marital Status: Not on file   Intimate Partner Violence:     Fear of Current or Ex-Partner: Not on file    Emotionally Abused: Not on file    Physically Abused: Not on file    Sexually Abused: Not on file   Housing Stability:     Unable to Pay for Housing in the Last Year: Not on file    Number of [place] in the Last Year: Not on file    Unstable Housing in the Last Year: Not on file       Review of Systems:  Review of Systems   Constitutional: Positive for malaise/fatigue. Negative for chills and fever. Cardiovascular: Negative for chest pain. Respiratory: Negative for cough, shortness of breath and wheezing. Musculoskeletal: Positive for muscle cramps. Negative for back pain, falls, muscle weakness and stiffness. No back pain today    Gastrointestinal: Negative for constipation, diarrhea, nausea and vomiting. Neurological: Negative for dizziness, headaches and numbness. Physical Exam:  /80   Pulse 77   Ht 5' 10\" (1.778 m)   Wt 200 lb (90.7 kg)   SpO2 100%   BMI 28.70 kg/m²     Physical Exam  Cardiovascular:      Rate and Rhythm: Normal rate. Pulmonary:      Effort: Pulmonary effort is normal.   Musculoskeletal:         General: Normal range of motion. Skin:     General: Skin is warm and dry. Neurological:      Mental Status: He is alert and oriented to person, place, and time.              Assessment:  Problem List Items Addressed This Visit     Lumbar radiculitis - Primary (Chronic)    DDD (degenerative disc disease), lumbar    Spinal stenosis of lumbar region with neurogenic claudication      Other Visit Diagnoses     Chronic use of opiate for therapeutic purpose                 Treatment Plan:    Plans to f/u with NS for options   F/u with MD in 4 weeks for options if not a surgical candidate    I have reviewed the chief complaint and history of present illness (including ROS and PFSH) and vital documentation by my staff and I agree with their documentation and have added where applicable.

## 2022-05-18 RX ORDER — FUROSEMIDE 20 MG/1
TABLET ORAL
Qty: 60 TABLET | Refills: 0 | OUTPATIENT
Start: 2022-05-18

## 2022-05-20 ENCOUNTER — HOSPITAL ENCOUNTER (OUTPATIENT)
Age: 62
Discharge: HOME | End: 2022-05-20
Payer: MEDICARE

## 2022-05-20 DIAGNOSIS — K70.31 ASCITES DUE TO ALCOHOLIC CIRRHOSIS (HCC): ICD-10-CM

## 2022-05-20 LAB
ALBUMIN SERPL-MCNC: 3.1 G/DL (ref 3.5–5.2)
ALP BLD-CCNC: 177 U/L (ref 40–129)
ALT SERPL-CCNC: 17 U/L (ref 5–41)
ANION GAP SERPL CALCULATED.3IONS-SCNC: 12 MMOL/L (ref 9–17)
AST SERPL-CCNC: 54 U/L
BILIRUB SERPL-MCNC: 1.24 MG/DL (ref 0.3–1.2)
BUN BLDV-MCNC: 9 MG/DL (ref 8–23)
CALCIUM SERPL-MCNC: 8.4 MG/DL (ref 8.6–10.4)
CHLORIDE BLD-SCNC: 92 MMOL/L (ref 98–107)
CO2: 24 MMOL/L (ref 20–31)
CREAT SERPL-MCNC: 0.76 MG/DL (ref 0.7–1.2)
GFR AFRICAN AMERICAN: >60 ML/MIN
GFR NON-AFRICAN AMERICAN: >60 ML/MIN
GFR SERPL CREATININE-BSD FRML MDRD: ABNORMAL ML/MIN/{1.73_M2}
GLUCOSE BLD-MCNC: 163 MG/DL (ref 70–99)
HCT VFR BLD CALC: 27.9 % (ref 41–53)
HEMOGLOBIN: 9.3 G/DL (ref 13.5–17.5)
MCH RBC QN AUTO: 30.1 PG (ref 26–34)
MCHC RBC AUTO-ENTMCNC: 33.2 G/DL (ref 31–37)
MCV RBC AUTO: 90.7 FL (ref 80–100)
PDW BLD-RTO: 18.5 % (ref 11.5–14.9)
PLATELET # BLD: 188 K/UL (ref 150–450)
PMV BLD AUTO: 7 FL (ref 6–12)
POTASSIUM SERPL-SCNC: 4.7 MMOL/L (ref 3.7–5.3)
RBC # BLD: 3.07 M/UL (ref 4.5–5.9)
SODIUM BLD-SCNC: 128 MMOL/L (ref 135–144)
TOTAL PROTEIN: 6 G/DL (ref 6.4–8.3)
WBC # BLD: 7.4 K/UL (ref 3.5–11)

## 2022-05-20 PROCEDURE — 36415 COLL VENOUS BLD VENIPUNCTURE: CPT

## 2022-05-20 PROCEDURE — 85027 COMPLETE CBC AUTOMATED: CPT

## 2022-05-20 PROCEDURE — 80053 COMPREHEN METABOLIC PANEL: CPT

## 2022-05-25 ENCOUNTER — TELEPHONE (OUTPATIENT)
Dept: GASTROENTEROLOGY | Age: 62
End: 2022-05-25

## 2022-05-31 RX ORDER — ONDANSETRON 4 MG/1
TABLET, ORALLY DISINTEGRATING ORAL
Qty: 10 TABLET | Refills: 0 | Status: SHIPPED | OUTPATIENT
Start: 2022-05-31

## 2022-05-31 RX ORDER — LACTULOSE 10 G/15ML
SOLUTION ORAL
Qty: 473 ML | Refills: 1 | Status: SHIPPED | OUTPATIENT
Start: 2022-05-31

## 2022-05-31 RX ORDER — ONDANSETRON 4 MG/1
TABLET, ORALLY DISINTEGRATING ORAL
Qty: 10 TABLET | Refills: 0 | OUTPATIENT
Start: 2022-05-31

## 2022-05-31 RX ORDER — LACTULOSE 10 G/15ML
SOLUTION ORAL
Qty: 473 ML | Refills: 1 | OUTPATIENT
Start: 2022-05-31

## 2022-05-31 NOTE — TELEPHONE ENCOUNTER
Please see if patient is taking these I believe they are prescribed by gastro. Please double check with patient.

## 2022-06-01 ENCOUNTER — OFFICE VISIT (OUTPATIENT)
Dept: GASTROENTEROLOGY | Age: 62
End: 2022-06-01
Payer: MEDICARE

## 2022-06-01 ENCOUNTER — HOSPITAL ENCOUNTER (OUTPATIENT)
Age: 62
Setting detail: SPECIMEN
Discharge: HOME | End: 2022-06-01

## 2022-06-01 VITALS
WEIGHT: 199.6 LBS | BODY MASS INDEX: 28.64 KG/M2 | SYSTOLIC BLOOD PRESSURE: 109 MMHG | HEART RATE: 83 BPM | TEMPERATURE: 97 F | DIASTOLIC BLOOD PRESSURE: 73 MMHG | OXYGEN SATURATION: 100 %

## 2022-06-01 DIAGNOSIS — K76.9 LIVER DISEASE, CHRONIC: Primary | ICD-10-CM

## 2022-06-01 DIAGNOSIS — C80.1 ADENOCARCINOMA IN A POLYP (HCC): ICD-10-CM

## 2022-06-01 DIAGNOSIS — K70.31 ASCITES DUE TO ALCOHOLIC CIRRHOSIS (HCC): ICD-10-CM

## 2022-06-01 DIAGNOSIS — K76.9 LIVER DISEASE, CHRONIC: ICD-10-CM

## 2022-06-01 LAB
ALBUMIN SERPL-MCNC: 3 G/DL (ref 3.5–5.2)
ALBUMIN/GLOBULIN RATIO: 1.3 (ref 1–2.5)
ALP BLD-CCNC: 127 U/L (ref 40–129)
ALT SERPL-CCNC: 14 U/L (ref 5–41)
ANION GAP SERPL CALCULATED.3IONS-SCNC: 16 MMOL/L (ref 9–17)
AST SERPL-CCNC: 31 U/L
BILIRUB SERPL-MCNC: 1.93 MG/DL (ref 0.3–1.2)
BUN BLDV-MCNC: 17 MG/DL (ref 8–23)
CALCIUM SERPL-MCNC: 8.9 MG/DL (ref 8.6–10.4)
CHLORIDE BLD-SCNC: 83 MMOL/L (ref 98–107)
CO2: 20 MMOL/L (ref 20–31)
CREAT SERPL-MCNC: 1.23 MG/DL (ref 0.7–1.2)
GFR AFRICAN AMERICAN: >60 ML/MIN
GFR NON-AFRICAN AMERICAN: 60 ML/MIN
GFR SERPL CREATININE-BSD FRML MDRD: ABNORMAL ML/MIN/{1.73_M2}
GLUCOSE BLD-MCNC: 77 MG/DL (ref 70–99)
HCT VFR BLD CALC: 24.8 % (ref 40.7–50.3)
HEMOGLOBIN: 7.6 G/DL (ref 13–17)
MCH RBC QN AUTO: 28.8 PG (ref 25.2–33.5)
MCHC RBC AUTO-ENTMCNC: 30.6 G/DL (ref 28.4–34.8)
MCV RBC AUTO: 93.9 FL (ref 82.6–102.9)
NRBC AUTOMATED: 0.2 PER 100 WBC
PDW BLD-RTO: 15.3 % (ref 11.8–14.4)
PLATELET # BLD: 216 K/UL (ref 138–453)
PMV BLD AUTO: 9.5 FL (ref 8.1–13.5)
POTASSIUM SERPL-SCNC: 4.3 MMOL/L (ref 3.7–5.3)
RBC # BLD: 2.64 M/UL (ref 4.21–5.77)
SODIUM BLD-SCNC: 119 MMOL/L (ref 135–144)
TOTAL PROTEIN: 5.4 G/DL (ref 6.4–8.3)
WBC # BLD: 10.5 K/UL (ref 3.5–11.3)

## 2022-06-01 PROCEDURE — 99213 OFFICE O/P EST LOW 20 MIN: CPT | Performed by: INTERNAL MEDICINE

## 2022-06-01 ASSESSMENT — ENCOUNTER SYMPTOMS
SORE THROAT: 0
VOMITING: 0
ALLERGIC/IMMUNOLOGIC NEGATIVE: 1
TROUBLE SWALLOWING: 0
ABDOMINAL DISTENTION: 1
SHORTNESS OF BREATH: 0
CHOKING: 0
CONSTIPATION: 0
ABDOMINAL PAIN: 1
VOICE CHANGE: 0
NAUSEA: 0
BLOOD IN STOOL: 0
BACK PAIN: 0
RESPIRATORY NEGATIVE: 1
ANAL BLEEDING: 0
DIARRHEA: 0
RECTAL PAIN: 0
COUGH: 0

## 2022-06-01 NOTE — PROGRESS NOTES
GI OFFICE FOLLOW UP    INTERVAL HISTORY:   No referring provider defined for this encounter. Chief Complaint   Patient presents with    Cirrhosis     Patient is here today to go over labs       1. Liver disease, chronic    2. Ascites due to alcoholic cirrhosis ([address]    3. 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 Patient is known to have stage II esophageal cancer, cirrhosis of the liver, ascites, portal hypertension. In the last few months, patient is having significant abdominal distention secondary to ascites. Few weeks ago I adjusted his diuretics. Following that looks like he feels better. Ascites appears to be better controlled. No swelling of the lower extremities. He does feel weak tired. No fever chills. No melena. He stated that he is following salt restricted diet. Medications reviewed. Past Medical,Family, and Social History reviewed and does contribute to the patient presenting condition. Patient's PMH/PSH,SH,PSYCH Hx, MEDs, ALLERGIES, and ROS were all reviewed and updated in the appropriate sections.  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 Mary Storey, orthopedic, every 3-4 months, gets steroid injection    Lezama esophagus     BPH (benign prostatic hypertrophy)     Cholelithiasis     Cirrhosis ([address]     COVID-19 12/2020    pt reports he had a positive test while at [facility] in 2020, was asymptomatic    COVID-19 vaccine series completed 5/20/2021, 6/22/2021    Moderna 5/20/2021, 6/22/2021    DDD (degenerative disc disease), lumbar     Depression     Esophageal cancer (HCC)     INVASIVE ADENOCARCINOMA ARISING IN TUBULAR ADENOMA WITH HIGH GRADE DYSPLASIA, ASSOCIATED WITH FOCAL INTESTINAL METAPLASIA     Esophageal varices (HCC)     Fatty liver     GERD (gastroesophageal reflux disease)     Hep C w/o coma, chronic (HCC)     History of alcohol abuse     6-12 beers a day; quit drinking July 2016    History of blood transfusion     History of colon polyps 2016    History of tobacco abuse     [place] (hard [place] hearing[place]     Hyperlipidemia     Hypertension     Port-A-Cath in place     right upper chest    Sciatica     Spinal stenosis     Stomach ulcer     hx of    Tubular adenoma of colon 2016, 2018    Vitamin D deficiency     Wears glasses        Past Surgical History:   Procedure Laterality Date    BUNIONECTOMY      twice on right side    BUNIONECTOMY Left     CARPAL TUNNEL RELEASE Right     COLONOSCOPY      at age 35    COLONOSCOPY  10/05/2016    polyps-pathology tubular adenoma, and abnormal looking mucosa right colon-pathology-tubular adenoma    COLONOSCOPY N/A 3/30/2018    COLONOSCOPY POLYPECTOMY COLD BIOPSY performed by Prasanna Singh MD at [address] [place] [address]  03/30/2018    Small polyp in the sigmoid colon and excised with biopsy forceps--tubular adenoma    COLONOSCOPY N/A 4/16/2022    COLONOSCOPY POLYPECTOMY performed by Prasanna Singh MD at [address] [place] [address] Av, COLON, DIAGNOSTIC      EGD    IR PORT PLACEMENT EQUAL OR GREATER THAN 5 YEARS  4/19/2021    IR PORT PLACEMENT EQUAL OR GREATER THAN 5 YEARS 4/19/2021 [facility] SPECIAL PROCEDURES    KNEE SURGERY Left     cyst removed    NASAL SEPTUM SURGERY      NERVE BLOCK Right 11/23/2020    NERVE BLOCK RIGHT CERVICAL STEROID INJECTION  C3-C6 performed by Alejandrina Puckett MD at [address]  01/04/16    steroid injection C7 T1    OTHER SURGICAL HISTORY  11/21/2016    Bilateral Lumbar CACHORRO L4-L5 injections    OTHER SURGICAL HISTORY  12/19/2016    lumbar steroid injection    OTHER SURGICAL HISTORY  09/28/2018    BILATERAL L5 CACHORRO (N/A Back)    OTHER SURGICAL HISTORY GASTROINTESTINAL ENDOSCOPY N/A 2022    EGD BAND LIGATION performed by Jesenia Johnston MD at [facility]:  No current outpatient medications on file. ALLERGIES:   No Known Allergies    FAMILY HISTORY:       Problem Relation Age of Onset   Combs Cancer Mother         pancreatic    Cancer Father         bone    Diabetes Sister     Asthma Brother          SOCIAL HISTORY:   Social History     Socioeconomic History    Marital status: Single     Spouse name: Not on file    Number of children: Not on file    Years of education: Not on file    Highest education level: Not on file   Occupational History    Not on file   Tobacco Use    Smoking status: Former Smoker     Packs/day: 1.00     Years: 45.00     Pack years: 45.00     Quit date: 2017     Years since quittin.3    Smokeless tobacco: Never Used   Vaping Use    Vaping Use: Never used   Substance and Sexual Activity    Alcohol use: Not Currently     Comment: quit     Drug use: Not Currently     Frequency: 1.0 times per week     Comment: cocaine,  stopped spring 2016    Sexual activity: Yes     Partners: Female   Other Topics Concern    Not on file   Social History Narrative     in the past, retired     Social Determinants of Health     Financial Resource Strain: Low Risk     Difficulty of Paying Living Expenses: Not hard at all   Food Insecurity: No Food Insecurity    Worried About [facility] in the Last Year: Never true   [address] in the Last Year: Never true   Transportation Needs:     Lack of Transportation (Medical): Not on file    Lack of Transportation (Non-Medical):  Not on file   Physical Activity:     Days of Exercise per Week: Not on file    Minutes of Exercise per Session: Not on file   Stress:     Feeling of Stress : Not on file   Social Connections:     Frequency of Communication with Friends and Family: Not on file    Frequency of Social Gatherings with Friends and Family: Not on file    Attends Jewish Services: Not on file    Active Member of Clubs or Organizations: Not on file    Attends Club or Organization Meetings: Not on file    Marital Status: Not on file   Intimate Partner Violence:     Fear of Current or Ex-Partner: Not on file    Emotionally Abused: Not on file    Physically Abused: Not on file    Sexually Abused: Not on file   Housing Stability:     Unable to Pay for Housing in the Last Year: Not on file    Number of [place] in the Last Year: Not on file    Unstable Housing in the Last Year: Not on file         REVIEW OF SYSTEMS:         Review of Systems   Constitutional: Positive for fatigue. Negative for appetite change and unexpected weight change. HENT: Positive for hearing loss (rt ear). Negative for sore throat, trouble swallowing and voice change. Eyes: Positive for visual disturbance (wears glasses). Respiratory: Negative. Negative for cough, choking and shortness of breath. Cardiovascular: Negative. Negative for chest pain and leg swelling. Gastrointestinal: Positive for abdominal distention and abdominal pain. Negative for anal bleeding, blood in stool, constipation, diarrhea, nausea, rectal pain and vomiting. Denies   Endocrine: Negative. Negative for polydipsia, polyphagia and polyuria. Genitourinary: Negative for difficulty urinating, frequency, hematuria and urgency. Musculoskeletal: Positive for gait problem (in wheel chair). Negative for back pain, joint swelling and myalgias. Skin: Negative. Allergic/Immunologic: Negative. Negative for environmental allergies, food allergies and immunocompromised state. Neurological: Negative for dizziness, tremors, weakness, light-headedness, numbness and headaches. Hematological: Bruises/bleeds easily. Psychiatric/Behavioral: Positive for sleep disturbance. The patient is nervous/anxious.         PHYSICAL EXAMINATION: Patient is awake, alert and oriented. No signs of encephalopathy. No icterus. Abdomen is distended with ascites. Bowel sounds present. No acute tenderness. No guarding. Lungs expands fairly. Has a scattered rhonchi. No wheezing. Cardiac examination revealed regular rate. Extremities negative edema. No signs of DVT. Vital signs reviewed per the nursing documentation. /73   Pulse 83   Temp 97 °F (36.1 °C)   Wt 199 lb 9.6 oz (90.5 kg)   SpO2 100%   BMI 28.64 kg/m²   Body mass index is 28.64 kg/m². Physical Exam      LABORATORY DATA: Reviewed  Lab Results   Component Value Date    WBC 4.4 06/08/2022    HGB 7.4 (L) 06/08/2022    HCT 24.9 (L) 06/08/2022    MCV 91.9 06/08/2022     (L) 06/08/2022     (L) 06/08/2022    K 4.0 06/08/2022     06/08/2022    CO2 21 06/08/2022    BUN 3 (L) 06/08/2022    CREATININE 0.58 (L) 06/08/2022    LABPROT 7.7 04/19/2012    LABALBU 2.5 (L) 06/08/2022    BILITOT 1.91 (H) 06/08/2022    ALKPHOS 86 06/08/2022    AST 18 06/08/2022    ALT 7 06/08/2022    INR 1.4 06/07/2022         Lab Results   Component Value Date    RBC 2.91 (L) 06/08/2022    HGB 7.4 (L) 06/08/2022    MCV 91.9 06/08/2022    MCH 27.9 06/08/2022    MCHC 30.4 (L) 06/08/2022    RDW 15.8 (H) 06/08/2022    MPV 7.0 06/08/2022    BASOPCT 0 06/08/2022    LYMPHSABS 0.40 (L) 06/08/2022    MONOSABS 0.26 06/08/2022    NEUTROABS 3.70 06/08/2022    EOSABS 0.04 06/08/2022    BASOSABS 0.00 06/08/2022         DIAGNOSTIC TESTING:     FLUORO FOR SURGICAL PROCEDURES    Result Date: 5/4/2022  Radiology result is complete; follow up with provider / physician office for radiology results          Assessment  1. Liver disease, chronic    2. Ascites due to alcoholic cirrhosis ([facility]    3. 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     Will review the labs and a basing on the labs will decide whether further adjustment of diuretic dose needed. To continue salt restricted diet. To contact me in the morning regarding lab results. After discussion, both patient and his wife understood and agreed. Thank you for allowing me to participate in the care of . Donovan Sarah. For any further questions please do not hesitate to contact me. I have reviewed and agree with the ROS entered by the MA/LPN. Note is dictated utilizing voice recognition software. Unfortunately this leads to occasional typographical errors.  Please contact our office if you have any questions        Sergio Barajas MD,FACP, [facility]  Board Certified in Gastroenterology and [address] [place] [address] Gastroenterology  Office #: [phone]

## 2022-06-02 ENCOUNTER — HOSPITAL ENCOUNTER (INPATIENT)
Age: 62
LOS: 7 days | Discharge: HOME HEALTH | DRG: 641 | End: 2022-06-09
Attending: STUDENT IN AN ORGANIZED HEALTH CARE EDUCATION/TRAINING PROGRAM | Admitting: INTERNAL MEDICINE
Payer: MEDICARE

## 2022-06-02 DIAGNOSIS — E87.1 HYPONATREMIA: Primary | ICD-10-CM

## 2022-06-02 LAB
ABSOLUTE BANDS #: 0.08 K/UL (ref 0–1)
ABSOLUTE EOS #: 0 K/UL (ref 0–0.4)
ABSOLUTE LYMPH #: 0.67 K/UL (ref 1–4.8)
ABSOLUTE MONO #: 1.26 K/UL (ref 0.1–1.3)
ALBUMIN SERPL-MCNC: 3.1 G/DL (ref 3.5–5.2)
ALP BLD-CCNC: 132 U/L (ref 40–129)
ALT SERPL-CCNC: 13 U/L (ref 5–41)
ANION GAP SERPL CALCULATED.3IONS-SCNC: 10 MMOL/L (ref 9–17)
ANION GAP SERPL CALCULATED.3IONS-SCNC: 11 MMOL/L (ref 9–17)
ANION GAP SERPL CALCULATED.3IONS-SCNC: 13 MMOL/L (ref 9–17)
AST SERPL-CCNC: 27 U/L
BANDS: 1 % (ref 0–10)
BASOPHILS # BLD: 0 % (ref 0–2)
BASOPHILS ABSOLUTE: 0 K/UL (ref 0–0.2)
BILIRUB SERPL-MCNC: 1.86 MG/DL (ref 0.3–1.2)
BILIRUBIN URINE: NEGATIVE
BUN BLDV-MCNC: 15 MG/DL (ref 8–23)
CALCIUM SERPL-MCNC: 8.9 MG/DL (ref 8.6–10.4)
CHLORIDE BLD-SCNC: 83 MMOL/L (ref 98–107)
CHLORIDE BLD-SCNC: 85 MMOL/L (ref 98–107)
CHLORIDE BLD-SCNC: 87 MMOL/L (ref 98–107)
CO2: 23 MMOL/L (ref 20–31)
CO2: 23 MMOL/L (ref 20–31)
CO2: 24 MMOL/L (ref 20–31)
COLOR: YELLOW
COMMENT UA: NORMAL
CREAT SERPL-MCNC: 1.11 MG/DL (ref 0.7–1.2)
CREATININE URINE: 39.4 MG/DL (ref 39–259)
EOSINOPHILS RELATIVE PERCENT: 0 % (ref 0–4)
GFR AFRICAN AMERICAN: >60 ML/MIN
GFR NON-AFRICAN AMERICAN: >60 ML/MIN
GFR SERPL CREATININE-BSD FRML MDRD: ABNORMAL ML/MIN/{1.73_M2}
GLUCOSE BLD-MCNC: 124 MG/DL (ref 70–99)
GLUCOSE URINE: NEGATIVE
HCT VFR BLD CALC: 25.9 % (ref 41–53)
HEMOGLOBIN: 8.2 G/DL (ref 13.5–17.5)
KETONES, URINE: NEGATIVE
LEUKOCYTE ESTERASE, URINE: NEGATIVE
LYMPHOCYTES # BLD: 8 % (ref 24–44)
MCH RBC QN AUTO: 28.1 PG (ref 26–34)
MCHC RBC AUTO-ENTMCNC: 31.7 G/DL (ref 31–37)
MCV RBC AUTO: 88.6 FL (ref 80–100)
MONOCYTES # BLD: 15 % (ref 1–7)
MORPHOLOGY: ABNORMAL
NITRITE, URINE: NEGATIVE
OSMOLALITY URINE: 116 MOSM/KG (ref 80–1300)
PDW BLD-RTO: 16.3 % (ref 11.5–14.9)
PH UA: 6.5 (ref 5–8)
PLATELET # BLD: 230 K/UL (ref 150–450)
PMV BLD AUTO: 6.8 FL (ref 6–12)
POTASSIUM SERPL-SCNC: 4.1 MMOL/L (ref 3.7–5.3)
POTASSIUM SERPL-SCNC: 4.1 MMOL/L (ref 3.7–5.3)
POTASSIUM SERPL-SCNC: 4.2 MMOL/L (ref 3.7–5.3)
PROTEIN UA: NEGATIVE
RBC # BLD: 2.92 M/UL (ref 4.5–5.9)
SEG NEUTROPHILS: 76 % (ref 36–66)
SEGMENTED NEUTROPHILS ABSOLUTE COUNT: 6.39 K/UL (ref 1.3–9.1)
SERUM OSMOLALITY: 259 MOSM/KG (ref 275–295)
SODIUM BLD-SCNC: 119 MMOL/L (ref 135–144)
SODIUM BLD-SCNC: 119 MMOL/L (ref 135–144)
SODIUM BLD-SCNC: 121 MMOL/L (ref 135–144)
SODIUM,UR: <20 MMOL/L
SPECIFIC GRAVITY UA: 1 (ref 1–1.03)
TOTAL PROTEIN: 5.9 G/DL (ref 6.4–8.3)
TURBIDITY: CLEAR
URINE HGB: NEGATIVE
UROBILINOGEN, URINE: NORMAL
WBC # BLD: 8.4 K/UL (ref 3.5–11)

## 2022-06-02 PROCEDURE — 84300 ASSAY OF URINE SODIUM: CPT

## 2022-06-02 PROCEDURE — 6370000000 HC RX 637 (ALT 250 FOR IP): Performed by: STUDENT IN AN ORGANIZED HEALTH CARE EDUCATION/TRAINING PROGRAM

## 2022-06-02 PROCEDURE — 80051 ELECTROLYTE PANEL: CPT

## 2022-06-02 PROCEDURE — 2060000000 HC ICU INTERMEDIATE R&B

## 2022-06-02 PROCEDURE — 2580000003 HC RX 258: Performed by: STUDENT IN AN ORGANIZED HEALTH CARE EDUCATION/TRAINING PROGRAM

## 2022-06-02 PROCEDURE — 83930 ASSAY OF BLOOD OSMOLALITY: CPT

## 2022-06-02 PROCEDURE — 83935 ASSAY OF URINE OSMOLALITY: CPT

## 2022-06-02 PROCEDURE — 80053 COMPREHEN METABOLIC PANEL: CPT

## 2022-06-02 PROCEDURE — 99223 1ST HOSP IP/OBS HIGH 75: CPT | Performed by: INTERNAL MEDICINE

## 2022-06-02 PROCEDURE — 99285 EMERGENCY DEPT VISIT HI MDM: CPT

## 2022-06-02 PROCEDURE — 93005 ELECTROCARDIOGRAM TRACING: CPT | Performed by: STUDENT IN AN ORGANIZED HEALTH CARE EDUCATION/TRAINING PROGRAM

## 2022-06-02 PROCEDURE — 85025 COMPLETE CBC W/AUTO DIFF WBC: CPT

## 2022-06-02 PROCEDURE — 36415 COLL VENOUS BLD VENIPUNCTURE: CPT

## 2022-06-02 PROCEDURE — 2000000000 HC ICU R&B

## 2022-06-02 PROCEDURE — 82570 ASSAY OF URINE CREATININE: CPT

## 2022-06-02 PROCEDURE — 6360000002 HC RX W HCPCS: Performed by: STUDENT IN AN ORGANIZED HEALTH CARE EDUCATION/TRAINING PROGRAM

## 2022-06-02 PROCEDURE — 2580000003 HC RX 258: Performed by: INTERNAL MEDICINE

## 2022-06-02 RX ORDER — SPIRONOLACTONE 100 MG/1
200 TABLET, FILM COATED ORAL 3 TIMES DAILY
Status: ON HOLD | COMMUNITY
End: 2022-06-09 | Stop reason: HOSPADM

## 2022-06-02 RX ORDER — ACETAMINOPHEN 325 MG/1
650 TABLET ORAL EVERY 6 HOURS PRN
Status: DISCONTINUED | OUTPATIENT
Start: 2022-06-02 | End: 2022-06-02

## 2022-06-02 RX ORDER — ONDANSETRON 2 MG/ML
4 INJECTION INTRAMUSCULAR; INTRAVENOUS EVERY 6 HOURS PRN
Status: DISCONTINUED | OUTPATIENT
Start: 2022-06-02 | End: 2022-06-09 | Stop reason: HOSPADM

## 2022-06-02 RX ORDER — ENOXAPARIN SODIUM 100 MG/ML
40 INJECTION SUBCUTANEOUS DAILY
Status: DISCONTINUED | OUTPATIENT
Start: 2022-06-02 | End: 2022-06-09 | Stop reason: HOSPADM

## 2022-06-02 RX ORDER — SODIUM CHLORIDE 9 MG/ML
INJECTION, SOLUTION INTRAVENOUS CONTINUOUS
Status: DISCONTINUED | OUTPATIENT
Start: 2022-06-02 | End: 2022-06-03

## 2022-06-02 RX ORDER — ONDANSETRON 4 MG/1
4 TABLET, ORALLY DISINTEGRATING ORAL EVERY 8 HOURS PRN
Status: DISCONTINUED | OUTPATIENT
Start: 2022-06-02 | End: 2022-06-09 | Stop reason: HOSPADM

## 2022-06-02 RX ORDER — NADOLOL 20 MG/1
20 TABLET ORAL DAILY
Status: DISCONTINUED | OUTPATIENT
Start: 2022-06-03 | End: 2022-06-09 | Stop reason: HOSPADM

## 2022-06-02 RX ORDER — FUROSEMIDE 40 MG/1
40 TABLET ORAL 3 TIMES DAILY
Status: ON HOLD | COMMUNITY
End: 2022-06-09 | Stop reason: HOSPADM

## 2022-06-02 RX ORDER — ATORVASTATIN CALCIUM 20 MG/1
20 TABLET, FILM COATED ORAL NIGHTLY
Status: DISCONTINUED | OUTPATIENT
Start: 2022-06-02 | End: 2022-06-09 | Stop reason: HOSPADM

## 2022-06-02 RX ORDER — SODIUM CHLORIDE 0.9 % (FLUSH) 0.9 %
5-40 SYRINGE (ML) INJECTION PRN
Status: DISCONTINUED | OUTPATIENT
Start: 2022-06-02 | End: 2022-06-09 | Stop reason: HOSPADM

## 2022-06-02 RX ORDER — SODIUM CHLORIDE 9 MG/ML
1000 INJECTION, SOLUTION INTRAVENOUS CONTINUOUS
Status: DISCONTINUED | OUTPATIENT
Start: 2022-06-02 | End: 2022-06-02

## 2022-06-02 RX ORDER — POLYETHYLENE GLYCOL 3350 17 G/17G
17 POWDER, FOR SOLUTION ORAL DAILY PRN
Status: DISCONTINUED | OUTPATIENT
Start: 2022-06-02 | End: 2022-06-09 | Stop reason: HOSPADM

## 2022-06-02 RX ORDER — FUROSEMIDE 20 MG/1
20 TABLET ORAL 2 TIMES DAILY
Status: DISCONTINUED | OUTPATIENT
Start: 2022-06-02 | End: 2022-06-09 | Stop reason: HOSPADM

## 2022-06-02 RX ORDER — SODIUM CHLORIDE 9 MG/ML
INJECTION, SOLUTION INTRAVENOUS PRN
Status: DISCONTINUED | OUTPATIENT
Start: 2022-06-02 | End: 2022-06-09 | Stop reason: HOSPADM

## 2022-06-02 RX ORDER — MIDODRINE HYDROCHLORIDE 5 MG/1
5 TABLET ORAL 3 TIMES DAILY PRN
Status: DISCONTINUED | OUTPATIENT
Start: 2022-06-02 | End: 2022-06-09 | Stop reason: HOSPADM

## 2022-06-02 RX ORDER — ACETAMINOPHEN 650 MG/1
650 SUPPOSITORY RECTAL EVERY 6 HOURS PRN
Status: DISCONTINUED | OUTPATIENT
Start: 2022-06-02 | End: 2022-06-02

## 2022-06-02 RX ORDER — SPIRONOLACTONE 25 MG/1
50 TABLET ORAL EVERY EVENING
Status: DISCONTINUED | OUTPATIENT
Start: 2022-06-02 | End: 2022-06-09 | Stop reason: HOSPADM

## 2022-06-02 RX ORDER — LACTULOSE 10 G/15ML
10 SOLUTION ORAL 3 TIMES DAILY
Status: DISCONTINUED | OUTPATIENT
Start: 2022-06-02 | End: 2022-06-09 | Stop reason: HOSPADM

## 2022-06-02 RX ORDER — SODIUM CHLORIDE 0.9 % (FLUSH) 0.9 %
5-40 SYRINGE (ML) INJECTION EVERY 12 HOURS SCHEDULED
Status: DISCONTINUED | OUTPATIENT
Start: 2022-06-02 | End: 2022-06-09 | Stop reason: HOSPADM

## 2022-06-02 RX ADMIN — LACTULOSE 10 G: 20 SOLUTION ORAL at 21:27

## 2022-06-02 RX ADMIN — FUROSEMIDE 20 MG: 20 TABLET ORAL at 21:27

## 2022-06-02 RX ADMIN — ENOXAPARIN SODIUM 40 MG: 100 INJECTION SUBCUTANEOUS at 18:21

## 2022-06-02 RX ADMIN — FUROSEMIDE 20 MG: 20 TABLET ORAL at 18:20

## 2022-06-02 RX ADMIN — SODIUM CHLORIDE 1000 ML: 9 INJECTION, SOLUTION INTRAVENOUS at 11:38

## 2022-06-02 RX ADMIN — MIDODRINE HYDROCHLORIDE 5 MG: 5 TABLET ORAL at 18:20

## 2022-06-02 RX ADMIN — SODIUM CHLORIDE, PRESERVATIVE FREE 10 ML: 5 INJECTION INTRAVENOUS at 21:27

## 2022-06-02 RX ADMIN — LACTULOSE 10 G: 20 SOLUTION ORAL at 18:19

## 2022-06-02 RX ADMIN — ATORVASTATIN CALCIUM 20 MG: 20 TABLET, FILM COATED ORAL at 21:27

## 2022-06-02 RX ADMIN — SODIUM CHLORIDE: 9 INJECTION, SOLUTION INTRAVENOUS at 18:29

## 2022-06-02 ASSESSMENT — ENCOUNTER SYMPTOMS
BACK PAIN: 0
EYE PAIN: 0
COLOR CHANGE: 0
SHORTNESS OF BREATH: 0
NAUSEA: 0
DIARRHEA: 0
ABDOMINAL PAIN: 0
NAUSEA: 1
COUGH: 0
SORE THROAT: 0
VOMITING: 0
RHINORRHEA: 0
CONSTIPATION: 0
FACIAL SWELLING: 0
EYE REDNESS: 0

## 2022-06-02 ASSESSMENT — LIFESTYLE VARIABLES: HOW OFTEN DO YOU HAVE A DRINK CONTAINING ALCOHOL: NEVER

## 2022-06-02 NOTE — PROGRESS NOTES
Medication History completed:    New medications:  Vitamin D 1000 Unit Tab - Take 1 T PO Daily    Medications discontinued:  None    Changes to dosing:  Lasix 40 mg Tab - Take 40 mg TID (patient dose was changed to 40mg TID at 5/12/22 office visit with Dr. Pam Munroe & patient has been adherent to this dose adjustment)  Spironolactone 100 mg Tab - 200 mg TID (Patient dose was changed to 200mg TID at 5/12/22 office visit with Dr. Pam Munroe. Patient has been taking differently, states he takes 100mg BID)    Stated allergies:  Patient states none, removed pollen allergy that patient states is erroneous. Other pertinent information:  Patient experienced recent dose increase of diuretics Furosemide and Spironolactone on 5/12/22. Patient states he was taking Percocet PRN pain but has not needed this for a month, last fill date of 04/06/2022 verified via [facility] for 30-day supply.     Thank you,  Judi Solorzano PharmD, [facility] [id number]  PGY-1 Pharmacy Resident

## 2022-06-02 NOTE — ED TRIAGE NOTES
Patient reports he got a call for an abnormal lab and was told to come to ED. Patient stated he does not know what lab is abnormal. Patient stated he is on a low sodium diet.  In chart

## 2022-06-02 NOTE — H&P
[address]      [address] [facility]     HISTORY AND PHYSICAL EXAMINATION            Date:   6/2/2022  Patient name:  Petty Casarez  Date of admission:  6/2/2022 10:24 AM  MRN:   [id number]  Account:  [de-identified]  YOB: 1960  PCP:    VASU Olmstead  Room:   09/09  Code Status:    Full Code    Chief Complaint:     Chief Complaint   Patient presents with    Abnormal Lab       History Obtained From:     patient    History of Present Illness:     Patient is a 48-year-old male with history of cirrhosis secondary to hep C, scented to the ED today due to abnormal lab results. Patient was seen by GI yesterday and had lab work ordered which came back this morning and showed hyponatremia of 119. Patient is complaining of mild symptoms such as nausea and fatigue but otherwise is asymptomatic. Patient denies chest pain, shortness of breath, dizziness, lightheadedness, shortness of breath and vomiting. Patient denies any blood in his stool or any changes in urination. Electrolyte panel done in the ED showed sodium of 119. CBC unremarkable. Patient's serum osmolarity is decreased at 259. Pressure in the ED 96/51. Nephrology was consulted in the ED.       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 Layla Quezada, orthopedic, every 3-4 months, gets steroid injection    Lezama esophagus     BPH (benign prostatic hypertrophy)     Cholelithiasis     Cirrhosis ([address]     COVID-19 12/2020    pt reports he had a positive test while at [facility] in 2020, was asymptomatic    COVID-19 vaccine series completed 5/20/2021, 6/22/2021    Moderna 5/20/2021, 6/22/2021    DDD (degenerative disc disease), lumbar     Depression     Esophageal cancer ([address] INVASIVE ADENOCARCINOMA ARISING IN TUBULAR ADENOMA WITH HIGH GRADE DYSPLASIA, ASSOCIATED WITH FOCAL INTESTINAL METAPLASIA     Esophageal varices (HCC)     Fatty liver     GERD (gastroesophageal reflux disease)     Hep C w/o coma, chronic (HCC)     History of alcohol abuse     6-12 beers a day; quit drinking July 2016    History of blood transfusion     History of colon polyps 2016    History of tobacco abuse     [place] (hard of hearing)     Hyperlipidemia     Hypertension     Port-A-Cath in place     right upper chest    Sciatica     Spinal stenosis     Stomach ulcer     hx of    Tubular adenoma of colon 2016, 2018    Vitamin D deficiency     Wears glasses         Past SurgicalHistory:     Past Surgical History:   Procedure Laterality Date    BUNIONECTOMY      twice on right side    BUNIONECTOMY Left     CARPAL TUNNEL RELEASE Right     COLONOSCOPY      at age 35    COLONOSCOPY  10/05/2016    polyps-pathology tubular adenoma, and abnormal looking mucosa right colon-pathology-tubular adenoma    COLONOSCOPY N/A 3/30/2018    COLONOSCOPY POLYPECTOMY COLD BIOPSY performed by Rene Benson MD at [facility]  03/30/2018    Small polyp in the sigmoid colon and excised with biopsy forceps--tubular adenoma    COLONOSCOPY N/A 4/16/2022    COLONOSCOPY POLYPECTOMY performed by Rene Benson MD at [facility], DIAGNOSTIC      EGD    IR PORT PLACEMENT EQUAL OR GREATER THAN 5 YEARS  4/19/2021    IR PORT PLACEMENT EQUAL OR GREATER THAN 5 YEARS 4/19/2021 [facility]    KNEE SURGERY Left     cyst removed    NASAL SEPTUM SURGERY      NERVE BLOCK Right 11/23/2020    NERVE BLOCK RIGHT CERVICAL STEROID INJECTION  C3-C6 performed by Ariel Horowitz MD at [facility]  01/04/16    steroid injection C7 T1    OTHER SURGICAL HISTORY  11/21/2016    Bilateral Lumbar CACHORRO L4-L5 injections    OTHER SURGICAL HISTORY  12/19/2016    lumbar steroid injection    OTHER SURGICAL HISTORY  09/28/2018    BILATERAL L5 CACHORRO (N/A Back)    OTHER SURGICAL HISTORY Right 11/23/2020    cervical injection    PAIN MANAGEMENT PROCEDURE Left 7/9/2020    EPIDURAL STEROID INJECTION LEFT L4 L5 performed by Dylon Campos MD at [facility] Left 7/20/2020    LEFT L4 L5 EPIDURAL STEROID INJECTION performed by Dylon Campos MD at [facility] Bilateral 8/17/2020    LUMBAR FACET BILATERAL L2-L5 performed by Dylon Campos MD at [facility] Bilateral 12/7/2020    NERVE BLOCK BILATERAL LUMBAR MEDIAL BRANCH L2-L5 performed by Dylon Campos MD at [address] AA&/STRD TFRML EPI LUMBAR/SACRAL 1 LEVEL Bilateral 9/6/2018    BILATERAL L5 CACHORRO performed by Dylon Campos MD at [address] AA&/STRD TFRML EPI LUMBAR/SACRAL 1 LEVEL N/A 9/28/2018    BILATERAL L5 CACHORRO performed by Dylon Campos MD at [facility] N/CARPAL TUNNEL SURG Right 8/29/2017    CARPAL TUNNEL RELEASE RIGHT performed by Cally Ruff MD at [facility] N/CARPAL TUNNEL SURG Left 10/31/2017    CARPAL TUNNEL RELEASE performed by Cally Ruff MD at [address] [place] [address] 12/29/2020    EGD BIOPSY performed by Neo Mendiola MD at [place] [address] N/A 2/2/2021    EGD BIOPSY and spot marking performed by Prema Velasquez MD at [place] [address] N/A 2/12/2021    ENDOSCOPIC ULTRASOUND, EGD performed by Rajat Cabral MD at [address] [place] [address]  2/12/2021    EGD DIAGNOSTIC ONLY performed by Rajat Cabral MD at [address] [place] [address] N/A 8/31/2021    EGD BIOPSY performed by Prema Velasquez MD at [place] [address] 1/21/2022    EGD BIOPSY performed by Prema Velasquez MD at [place] [address] N/A 4/15/2022    EGD ESOPHAGOGASTRODUODENOSCOPY performed by Lonell Simmonds, MD at [facility]          Medications Prior to Admission:        Prior to Admission medications    Medication Sig Start Date End Date Taking? Authorizing Provider   furosemide (LASIX) 40 MG tablet Take 40 mg by mouth 3 times daily   Yes Historical Provider, MD   spironolactone (ALDACTONE) 100 MG tablet Take 200 mg by mouth 3 times daily    Yes Historical Provider, MD   vitamin D (CHOLECALCIFEROL) 25 MCG (1000 UT) TABS tablet Take 1,000 Units by mouth daily   Yes Historical Provider, MD   ondansetron (ZOFRAN-ODT) 4 MG disintegrating tablet dissolve 1 tablet by mouth every 8 hours if needed for nausea and vomiting 5/31/22   VASU Rubi   lactulose [facility]) 10 GM/15ML solution take 30 milliliters by mouth three times a day 5/31/22   VASU Rubi   nadolol (CORGARD) 20 MG tablet take 1 tablet by mouth once daily 5/6/22   Historical Provider, MD   FLUoxetine (PROZAC) 20 MG capsule Take 1 capsule by mouth daily 4/21/22   Devika Feliciano MD   atorvastatin (LIPITOR) 20 MG tablet Take 1 tablet by mouth nightly 4/21/22   Devika Feliciano MD   midodrine (PROAMATINE) 5 MG tablet Take 1 tablet by mouth 3 times daily as needed (SBP <110) 4/21/22   Devika Feliciano MD   ferrous sulfate (IRON 325) 325 (65 Fe) MG tablet Take 1 tablet by mouth 2 times daily 1/12/22   VASU Rubi   omeprazole (PRILOSEC) 40 MG delayed release capsule take 1 capsule by mouth EVERY MORNING BEFORE BREAKFAST 11/12/21   Lay Mulligan MD        Allergies:     Patient has no known allergies. Social History:     Tobacco:    reports that he quit smoking about 5 years ago. He has a 45.00 pack-year smoking history. He has never used smokeless tobacco.  Alcohol:      reports previous alcohol use. Drug Use:  reports previous drug use. Frequency: 1.00 time per week.     Family History:     Family History   Problem Relation Age of Onset    Cancer Mother pancreatic    Cancer Father         bone    Diabetes Sister     Asthma Brother        Review of Systems:     Positive and Negative as described in HPI. Review of Systems   Constitutional: Negative for chills and fever. Respiratory: Negative for shortness of breath. Cardiovascular: Negative for chest pain. Gastrointestinal: Positive for nausea. Negative for abdominal pain, constipation, diarrhea and vomiting. Genitourinary: Negative for dysuria. Neurological: Negative for dizziness, light-headedness and headaches. Physical Exam:   /64   Pulse 67   Temp 98.3 °F (36.8 °C) (Oral)   Resp 17   Ht 5' 10\" (1.778 m)   Wt 200 lb (90.7 kg)   SpO2 99%   BMI 28.70 kg/m²   Temp (24hrs), Av.3 °F (36.8 °C), Min:98.3 °F (36.8 °C), Max:98.3 °F (36.8 °C)    No results for input(s): POCGLU in the last 72 hours. No intake or output data in the 24 hours ending 22    Physical Exam  Constitutional:       Appearance: Normal appearance. HENT:      Head: Normocephalic and atraumatic. Cardiovascular:      Rate and Rhythm: Normal rate and regular rhythm. Pulses: Normal pulses. Heart sounds: Normal heart sounds. No murmur heard. No friction rub. No gallop. Pulmonary:      Effort: Pulmonary effort is normal. No respiratory distress. Breath sounds: Normal breath sounds. No stridor. No wheezing, rhonchi or rales. Abdominal:      General: Abdomen is flat. There is no distension. Palpations: Abdomen is soft. There is no mass. Tenderness: There is no abdominal tenderness. There is no guarding or rebound. Hernia: No hernia is present. Neurological:      Mental Status: He is alert.          Investigations:     Laboratory Testing:  Recent Results (from the past 24 hour(s))   CBC with Auto Differential    Collection Time: 22 10:30 AM   Result Value Ref Range    WBC 8.4 3.5 - 11.0 k/uL    RBC 2.92 (L) 4.5 - 5.9 m/uL    Hemoglobin 8.2 (L) 13.5 - 17.5 g/dL    Hematocrit 25.9 (L) 41 - 53 %    MCV 88.6 80 - 100 fL    MCH 28.1 26 - 34 pg    MCHC 31.7 31 - 37 g/dL    RDW 16.3 (H) 11.5 - 14.9 %    Platelets 216 005 - 679 k/uL    MPV 6.8 6.0 - 12.0 fL    Seg Neutrophils 76 (H) 36 - 66 %    Lymphocytes 8 (L) 24 - 44 %    Monocytes 15 (H) 1 - 7 %    Eosinophils % 0 0 - 4 %    Basophils 0 0 - 2 %    Bands 1 0 - 10 %    Segs Absolute 6.39 1.3 - 9.1 k/uL    Absolute Lymph # 0.67 (L) 1.0 - 4.8 k/uL    Absolute Mono # 1.26 0.1 - 1.3 k/uL    Absolute Eos # 0.00 0.0 - 0.4 k/uL    Basophils Absolute 0.00 0.0 - 0.2 k/uL    Absolute Bands # 0.08 0.0 - 1.0 k/uL    Morphology ANISOCYTOSIS PRESENT     Morphology HYPOCHROMIA PRESENT     Morphology 1+ ELLIPTOCYTES     Morphology 1+ TEARDROPS    Comprehensive Metabolic Panel w/ Reflex to MG    Collection Time: 06/02/22 10:30 AM   Result Value Ref Range    Glucose 124 (H) 70 - 99 mg/dL    BUN 15 8 - 23 mg/dL    CREATININE 1.11 0.70 - 1.20 mg/dL    Calcium 8.9 8.6 - 10.4 mg/dL    Sodium 119 (LL) 135 - 144 mmol/L    Potassium 4.1 3.7 - 5.3 mmol/L    Chloride 83 (L) 98 - 107 mmol/L    CO2 23 20 - 31 mmol/L    Anion Gap 13 9 - 17 mmol/L    Alkaline Phosphatase 132 (H) 40 - 129 U/L    ALT 13 5 - 41 U/L    AST 27 <40 U/L    Total Bilirubin 1.86 (H) 0.3 - 1.2 mg/dL    Total Protein 5.9 (L) 6.4 - 8.3 g/dL    Albumin 3.1 (L) 3.5 - 5.2 g/dL    GFR Non-African American >60 >60 mL/min    GFR African American >60 >60 mL/min    GFR Comment         Osmolality    Collection Time: 06/02/22 10:30 AM   Result Value Ref Range    Serum Osmolality 259 (L) 275 - 295 mOsm/kg   Urinalysis with Reflex to Culture    Collection Time: 06/02/22  1:25 PM    Specimen: Urine   Result Value Ref Range    Color, UA Yellow Yellow    Turbidity UA Clear Clear    Glucose, Ur NEGATIVE NEGATIVE    Bilirubin Urine NEGATIVE NEGATIVE    Ketones, Urine NEGATIVE NEGATIVE    Specific Gravity, UA 1.005 1.000 - 1.030    Urine Hgb NEGATIVE NEGATIVE    pH, UA 6.5 5.0 - 8.0    Protein, UA NEGATIVE NEGATIVE    Urobilinogen, Urine Normal Normal    Nitrite, Urine NEGATIVE NEGATIVE    Leukocyte Esterase, Urine NEGATIVE NEGATIVE    Urinalysis Comments       Microscopic exam not performed based on chemical results unless requested in original order. Sodium, Random Ur    Collection Time: 06/02/22  1:25 PM   Result Value Ref Range    Sodium,Ur <20 mmol/L   Creatinine,Random Ur    Collection Time: 06/02/22  1:25 PM   Result Value Ref Range    Creatinine, Ur 39.4 39.0 - 259.0 mg/dL   OSMOLALITY, URINE    Collection Time: 06/02/22  1:25 PM   Result Value Ref Range    Osmolality, Ur 116 80 - 1300 mOsm/kg   Electrolyte Panel    Collection Time: 06/02/22  4:01 PM   Result Value Ref Range    Sodium 119 (LL) 135 - 144 mmol/L    Potassium 4.2 3.7 - 5.3 mmol/L    Chloride 85 (L) 98 - 107 mmol/L    CO2 23 20 - 31 mmol/L    Anion Gap 11 9 - 17 mmol/L       Imaging/Diagnostics:  [facility] FOR SURGICAL PROCEDURES    Result Date: 5/4/2022  Radiology result is complete; follow up with provider / physician office for radiology results       Assessment :      Primary Problem  Hyponatremia    Active Hospital Problems    Diagnosis Date Noted    Hyponatremia [E87.1] 07/20/2016       Plan:     Patient status Admit as inpatient in the intensive care unit    1.  Hyponatremia  -Sodium level in the , repeat also 19  -Nephrology consulted  -Urine osmolarity 116, within normal limits  -Urine creatinine normal  -Urine sodium normal  -Urinalysis unremarkable  -Urine osmolarity 259, decreased  -IV normal saline fluid running at 75 mL/h    2.  Hypotension  -Midodrine 5 mg 3 times as needed   -Hold if systolic blood pressure less than 110          Consultations:   IP CONSULT TO INTERNAL MEDICINE  IP CONSULT TO NEPHROLOGY     Patient is admitted as inpatient status because of co-morbiditieslisted above, severity of signs and symptoms as outlined, requirement for current medical therapies and most importantly because of direct risk to patient if care not provided in a hospital setting. Ricardo Santos MD  6/2/2022  6:14 PM    Copy sent to VASU Ramires    Attestation and add on       I have discussed the care of Gabo Kinsey , including pertinent history and exam findings,    6/2/2022    with the resident. I have seen and examined the patient and the key elements of all parts of the encounter have been performed by me . I agree with the assessment, plan and orders as documented by the resident. Hospital Problems           Last Modified POA    * (Principal) Hyponatremia 6/2/2022 Yes    Drop in hemoglobin 6/3/2022 Yes    Cirrhosis ([address] 6/2/2022 Yes             ''''''''''       MD CHANDNI Joseph [address], [address] [place] [address].    Phone [phone]   Fax: [phone]  Answering Service: [phone]

## 2022-06-02 NOTE — ED PROVIDER NOTES
EMERGENCY DEPARTMENT ENCOUNTER    Pt Name: Mohsen Parks  MRN: [id number]  [place] 1960  Date of evaluation: 6/2/22  CHIEF COMPLAINT       Chief Complaint   Patient presents with    Abnormal Lab     HISTORY OF PRESENT ILLNESS   HPI  59-year-old male history of cirrhosis presents for evaluation with abnormal lab value. Patient was seen by GI yesterday had lab work done which showed hyponatremia with a sodium of 119. Patient is states he is feeling some nausea and fatigue but usually always feels this with his cirrhosis. He has no other new symptoms. No blood in his stool or dark tarry stool, no changes in urination. Says he is on a low-sodium diet with liver cirrhosis. REVIEW OF SYSTEMS     Review of Systems   Constitutional: Positive for fatigue. Negative for chills. HENT: Negative for facial swelling, nosebleeds, rhinorrhea and sore throat. Eyes: Negative for pain and redness. Respiratory: Negative for cough and shortness of breath. Cardiovascular: Negative for chest pain and leg swelling. Gastrointestinal: Negative for abdominal pain, diarrhea, nausea and vomiting. Genitourinary: Negative for flank pain and hematuria. Musculoskeletal: Negative for arthralgias and back pain. Skin: Negative for color change and rash. Neurological: Negative for dizziness, tremors, facial asymmetry, speech difficulty, weakness and numbness.      PASTMEDICAL HISTORY     Past Medical History:   Diagnosis Date    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    S/P epidural steroid injection Z92.241    Elevated LFTs R79.89    Seasonal allergies J30.2    Lumbar facet arthropathy M47.816    Cervical facet syndrome M47.812    Acute gastrointestinal bleeding K92.2    Thrombocytopenia (HCC) D69.6    Hepatitis C virus infection resolved after antiviral drug therapy Z86.19    Gastrointestinal hemorrhage with melena K92.1    Alcohol abuse F10.10    Altered mental status R41.82    Hypocalcemia E83.51    Hypophosphatemia E83.39    Malignant neoplasm of lower third of esophagus (HCC) C15.5    COVID-19 U07.1    Anxiety F41.9    Current smoker F17.200    Severe comorbid illness R69    Gait instability R26.81    Abnormal findings on diagnostic imaging of spine R93.7    Cervical spinal stenosis M48.02    Spinal stenosis of lumbar region with neurogenic claudication M48.062    Low hemoglobin D64.9    Symptomatic anemia, microcytic, acute D64.9    Hypotension I95.9    Former smoker, 50+ pack years, quit 2016 Z87.891    HLD (hyperlipidemia) E78.5    Esophageal adenocarcinoma (HCC) C15.9    Anemia D64.9    Acute kidney injury ([address] N17.9    Ascites R18.8    Shortness of breath R06.02     SURGICAL HISTORY       Past Surgical History:   Procedure Laterality Date    BUNIONECTOMY      twice on right side    BUNIONECTOMY Left     CARPAL TUNNEL RELEASE Right     COLONOSCOPY      at age 35    COLONOSCOPY  10/05/2016    polyps-pathology tubular adenoma, and abnormal looking mucosa right colon-pathology-tubular adenoma    COLONOSCOPY N/A 3/30/2018    COLONOSCOPY POLYPECTOMY COLD BIOPSY performed by Rene Benson MD at [address] [facility] [address]  03/30/2018    Small polyp in the sigmoid colon and excised with biopsy forceps--tubular adenoma    COLONOSCOPY N/A 4/16/2022    COLONOSCOPY POLYPECTOMY performed by Rene Benson MD at [address], COLON, DIAGNOSTIC      EGD    IR PORT PLACEMENT EQUAL OR GREATER THAN 5 YEARS  4/19/2021    IR PORT PLACEMENT EQUAL OR GREATER THAN 5 YEARS 4/19/2021 [facility]ED[facility]    KNEE SURGERY Left     cyst removed    NASAL SEPTUM SURGERY      NERVE BLOCK Right 11/23/2020    NERVE BLOCK RIGHT CERVICAL STEROID INJECTION  C3-C6 performed by Kobe Wilson MD at [address] [facility]  01/04/16    steroid injection C7 T1    OTHER SURGICAL HISTORY  11/21/2016    Bilateral Lumbar CACHORRO L4-L5 injections    OTHER SURGICAL HISTORY  12/19/2016    lumbar steroid injection    OTHER SURGICAL HISTORY  09/28/2018    BILATERAL L5 CACHORRO (N/A Back)    OTHER SURGICAL HISTORY Right 11/23/2020    cervical injection    PAIN MANAGEMENT PROCEDURE Left 7/9/2020    EPIDURAL STEROID INJECTION LEFT L4 L5 performed by Kobe Wilson MD at [address] [facility] Left 7/20/2020    LEFT L4 L5 EPIDURAL STEROID INJECTION performed by Kobe Wilson MD at [address] [facility] Bilateral 8/17/2020    LUMBAR FACET BILATERAL L2-L5 performed by Kobe Wilson MD at [address] [facility] Bilateral 12/7/2020    NERVE BLOCK BILATERAL LUMBAR MEDIAL BRANCH L2-L5 performed by Kobe Wilson MD at [address] AA&/STRD TFRML EPI LUMBAR/SACRAL 1 LEVEL Bilateral 9/6/2018    BILATERAL L5 CACHORRO performed by Kobe Wilson MD at [address] AA&/STRD TFRML EPI LUMBAR/SACRAL 1 LEVEL N/A 9/28/2018    BILATERAL L5 CACHORRO performed by Kobe Wilson MD at [address] [facility] N/CARPAL TUNNEL SURG Right 8/29/2017    CARPAL TUNNEL RELEASE RIGHT performed by Michi hCo MD at [facility] N/CARPAL TUNNEL SURG Left 10/31/2017    CARPAL TUNNEL RELEASE performed by Michi Cho MD at [facility] 12/29/2020    EGD BIOPSY performed by Aditi Navarro MD at [facility] 2/2/2021    EGD BIOPSY and spot marking performed by Gómez Lechuga MD at [place] [address] N/A 2021    ENDOSCOPIC ULTRASOUND, EGD performed by Mica Calderón MD at [address] [place] [address]  2021    EGD DIAGNOSTIC ONLY performed by Mica Calderón MD at [address] [place] [address] N/A 2021    EGD BIOPSY performed by Mohan Lin MD at [place] [address] 2022    EGD BIOPSY performed by Mohan Lin MD at [place] [address] 4/15/2022    EGD ESOPHAGOGASTRODUODENOSCOPY performed by Chantale Bennett MD at [address] [place] [address]       Previous Medications    ATORVASTATIN (LIPITOR) 20 MG TABLET    Take 1 tablet by mouth nightly    FERROUS SULFATE (IRON 325) 325 (65 FE) MG TABLET    Take 1 tablet by mouth 2 times daily    FLUOXETINE (PROZAC) 20 MG CAPSULE    Take 1 capsule by mouth daily    FUROSEMIDE (LASIX) 40 MG TABLET    Take 0.5 tablets by mouth 2 times daily    LACTULOSE (CHRONULAC) 10 GM/15ML SOLUTION    take 30 milliliters by mouth three times a day    MIDODRINE (PROAMATINE) 5 MG TABLET    Take 1 tablet by mouth 3 times daily as needed (SBP <110)    NADOLOL (CORGARD) 20 MG TABLET    take 1 tablet by mouth once daily    OMEPRAZOLE (PRILOSEC) 40 MG DELAYED RELEASE CAPSULE    take 1 capsule by mouth EVERY MORNING BEFORE BREAKFAST    ONDANSETRON (ZOFRAN-ODT) 4 MG DISINTEGRATING TABLET    dissolve 1 tablet by mouth every 8 hours if needed for nausea and vomiting    SPIRONOLACTONE (ALDACTONE) 50 MG TABLET    Take 1 tablet by mouth every evening     ALLERGIES     is allergic to bee pollen and pollen extract. FAMILY HISTORY     He indicated that his mother is . He indicated that his father is . He indicated that both of his sisters are alive. He indicated that his brother is alive.      SOCIAL HISTORY       Social History     Tobacco Use    Smoking status: Former Smoker     Packs/day: 1.00     Years: 45.00     Pack years: 37.1     Quit date: 2017     Years since quittin.3    Smokeless tobacco: Never Used   Vaping Use    Vaping Use: Never used   Substance Use Topics    Alcohol use: Not Currently     Comment: quit 2019    Drug use: Not Currently     Frequency: 1.0 times per week     Comment: cocaine,  stopped spring 2016     PHYSICAL EXAM     INITIAL VITALS: BP 95/69   Pulse 86   Temp 98.3 °F (36.8 °C) (Oral)   Resp 21   Ht 5' 10\" (1.778 m)   Wt 200 lb (90.7 kg)   SpO2 100%   BMI 28.70 kg/m²    Physical Exam  Vitals and nursing note reviewed. Constitutional:       Appearance: Normal appearance. HENT:      Head: Normocephalic and atraumatic. Nose: Nose normal.   Eyes:      Extraocular Movements: Extraocular movements intact. Pupils: Pupils are equal, round, and reactive to light. Cardiovascular:      Rate and Rhythm: Normal rate and regular rhythm. Pulmonary:      Effort: Pulmonary effort is normal. No respiratory distress. Breath sounds: Normal breath sounds. Abdominal:      General: Abdomen is flat. There is no distension. Palpations: Abdomen is soft. There is no mass. Comments: Mildly distended nontender abdomen   Musculoskeletal:         General: No swelling. Normal range of motion. Cervical back: Normal range of motion. No rigidity. Skin:     General: Skin is warm and dry. Neurological:      General: No focal deficit present. Mental Status: He is alert. Mental status is at baseline. Cranial Nerves: No cranial nerve deficit. MEDICAL DECISION MAKIN-year-old male presents for evaluation with abnormal lab value. Repeat sodium today is 119. Discussed with internal medicine we will admit to the hospital for further work-up. Started on saline infusion. Discussed with nephrology will admit to the intermediate ICU.          CRITICAL CARE:       PROCEDURES:    Procedures    DIAGNOSTIC RESULTS   EKG:All EKG's are interpreted by the Emergency Department Physician who either signs or Co-signs this chart in the absence of a cardiologist.        RADIOLOGY:All plain film, CT, MRI, and formal ultrasound images (except ED bedside ultrasound) are read by the radiologist, see reports below, unless otherwisenoted in MDM or here. No orders to display     LABS: All lab results were reviewed by myself, and all abnormals are listed below. Labs Reviewed   CBC WITH AUTO DIFFERENTIAL - Abnormal; Notable for the following components:       Result Value    RBC 2.92 (*)     Hemoglobin 8.2 (*)     Hematocrit 25.9 (*)     RDW 16.3 (*)     Seg Neutrophils 76 (*)     Lymphocytes 8 (*)     Monocytes 15 (*)     Absolute Lymph # 0.67 (*)     All other components within normal limits   COMPREHENSIVE METABOLIC PANEL W/ REFLEX TO MG FOR LOW K - Abnormal; Notable for the following components:    Glucose 124 (*)     Sodium 119 (*)     Chloride 83 (*)     Alkaline Phosphatase 132 (*)     Total Bilirubin 1.86 (*)     Total Protein 5.9 (*)     Albumin 3.1 (*)     All other components within normal limits   URINALYSIS WITH MICROSCOPIC   URINALYSIS WITH REFLEX TO CULTURE   SODIUM, URINE, RANDOM   CREATININE, RANDOM URINE   OSMOLALITY   OSMOLALITY, URINE       EMERGENCY DEPARTMENTCOURSE:         Vitals:    Vitals:    06/02/22 1020 06/02/22 1100 06/02/22 1200 06/02/22 1215   BP: (!) 96/51 (!) 91/59 95/69    Pulse: 85 73 86    Resp: 20 (!) 34  21   Temp: 98.3 °F (36.8 °C)      TempSrc: Oral      SpO2: 100% 99% 100%    Weight: 200 lb (90.7 kg)      Height: 5' 10\" (1.778 m)          The patient was given the following medications while in the emergency department:  Orders Placed This Encounter   Medications    DISCONTD: 0.9 % sodium chloride infusion     CONSULTS:  IP CONSULT TO INTERNAL MEDICINE  IP CONSULT TO NEPHROLOGY    FINAL IMPRESSION      1.  Hyponatremia          DISPOSITION/PLAN   DISPOSITION Admitted 06/02/2022 12:40:15 PM      PATIENT REFERRED TO:  No follow-up provider specified. DISCHARGE MEDICATIONS:  New Prescriptions    No medications on file     The care is provided during an unprecedented national emergency due to the novel coronavirus, COVID 19.   MD Sushma Connor MD  06/02/22 1013

## 2022-06-02 NOTE — Clinical Note
Discharge Plan[de-identified] Home with Office Follow-up   Telemetry/Cardiac Monitoring Required?: Yes   Bed request comments: Progressive

## 2022-06-03 PROBLEM — R71.0 DROP IN HEMOGLOBIN: Status: ACTIVE | Noted: 2022-06-03

## 2022-06-03 LAB
ABSOLUTE EOS #: 0.04 K/UL (ref 0–0.4)
ABSOLUTE LYMPH #: 0.36 K/UL (ref 1–4.8)
ABSOLUTE MONO #: 0.45 K/UL (ref 0.1–1.3)
ALBUMIN SERPL-MCNC: 2.3 G/DL (ref 3.5–5.2)
ALP BLD-CCNC: 101 U/L (ref 40–129)
ALT SERPL-CCNC: 10 U/L (ref 5–41)
ANION GAP SERPL CALCULATED.3IONS-SCNC: 15 MMOL/L (ref 9–17)
ANION GAP SERPL CALCULATED.3IONS-SCNC: 8 MMOL/L (ref 9–17)
ANION GAP SERPL CALCULATED.3IONS-SCNC: 9 MMOL/L (ref 9–17)
AST SERPL-CCNC: 21 U/L
BASOPHILS # BLD: 0 % (ref 0–2)
BASOPHILS ABSOLUTE: 0 K/UL (ref 0–0.2)
BILIRUB SERPL-MCNC: 1.37 MG/DL (ref 0.3–1.2)
BUN BLDV-MCNC: 15 MG/DL (ref 8–23)
CALCIUM SERPL-MCNC: 7.9 MG/DL (ref 8.6–10.4)
CHLORIDE BLD-SCNC: 90 MMOL/L (ref 98–107)
CHLORIDE BLD-SCNC: 92 MMOL/L (ref 98–107)
CHLORIDE BLD-SCNC: 93 MMOL/L (ref 98–107)
CHLORIDE BLD-SCNC: 93 MMOL/L (ref 98–107)
CHLORIDE BLD-SCNC: 95 MMOL/L (ref 98–107)
CO2: 16 MMOL/L (ref 20–31)
CO2: 23 MMOL/L (ref 20–31)
CO2: 24 MMOL/L (ref 20–31)
CO2: 24 MMOL/L (ref 20–31)
CO2: 25 MMOL/L (ref 20–31)
CREAT SERPL-MCNC: 0.95 MG/DL (ref 0.7–1.2)
DATE, STOOL #1: NORMAL
DATE, STOOL #2: NORMAL
EKG ATRIAL RATE: 81 BPM
EKG Q-T INTERVAL: 412 MS
EKG QRS DURATION: 74 MS
EKG QTC CALCULATION (BAZETT): 478 MS
EKG R AXIS: 22 DEGREES
EKG T AXIS: -10 DEGREES
EKG VENTRICULAR RATE: 81 BPM
EOSINOPHILS RELATIVE PERCENT: 1 % (ref 0–4)
GFR AFRICAN AMERICAN: >60 ML/MIN
GFR NON-AFRICAN AMERICAN: >60 ML/MIN
GFR SERPL CREATININE-BSD FRML MDRD: ABNORMAL ML/MIN/{1.73_M2}
GLUCOSE BLD-MCNC: 112 MG/DL (ref 70–99)
HCT VFR BLD CALC: 19.1 % (ref 41–53)
HCT VFR BLD CALC: 22.4 % (ref 41–53)
HCT VFR BLD CALC: 22.8 % (ref 41–53)
HEMOCCULT SP1 STL QL: NEGATIVE
HEMOCCULT SP2 STL QL: NEGATIVE
HEMOGLOBIN: 5.9 G/DL (ref 13.5–17.5)
HEMOGLOBIN: 7 G/DL (ref 13.5–17.5)
HEMOGLOBIN: 7.2 G/DL (ref 13.5–17.5)
LYMPHOCYTES # BLD: 8 % (ref 24–44)
MCH RBC QN AUTO: 28 PG (ref 26–34)
MCH RBC QN AUTO: 28 PG (ref 26–34)
MCHC RBC AUTO-ENTMCNC: 30.8 G/DL (ref 31–37)
MCHC RBC AUTO-ENTMCNC: 31.6 G/DL (ref 31–37)
MCV RBC AUTO: 88.4 FL (ref 80–100)
MCV RBC AUTO: 90.9 FL (ref 80–100)
MONOCYTES # BLD: 10 % (ref 1–7)
MORPHOLOGY: ABNORMAL
NUCLEATED RED BLOOD CELLS: 1 PER 100 WBC
PDW BLD-RTO: 15.9 % (ref 11.5–14.9)
PDW BLD-RTO: 16.6 % (ref 11.5–14.9)
PLATELET # BLD: 132 K/UL (ref 150–450)
PLATELET # BLD: 148 K/UL (ref 150–450)
PMV BLD AUTO: 6.5 FL (ref 6–12)
PMV BLD AUTO: 6.8 FL (ref 6–12)
POTASSIUM SERPL-SCNC: 3.6 MMOL/L (ref 3.7–5.3)
POTASSIUM SERPL-SCNC: 3.8 MMOL/L (ref 3.7–5.3)
POTASSIUM SERPL-SCNC: 3.9 MMOL/L (ref 3.7–5.3)
POTASSIUM SERPL-SCNC: 4.3 MMOL/L (ref 3.7–5.3)
POTASSIUM SERPL-SCNC: 5.1 MMOL/L (ref 3.7–5.3)
RBC # BLD: 2.1 M/UL (ref 4.5–5.9)
RBC # BLD: 2.58 M/UL (ref 4.5–5.9)
REASON FOR REJECTION: NORMAL
REASON FOR REJECTION: NORMAL
SEG NEUTROPHILS: 81 % (ref 36–66)
SEGMENTED NEUTROPHILS ABSOLUTE COUNT: 3.61 K/UL (ref 1.3–9.1)
SODIUM BLD-SCNC: 123 MMOL/L (ref 135–144)
SODIUM BLD-SCNC: 123 MMOL/L (ref 135–144)
SODIUM BLD-SCNC: 125 MMOL/L (ref 135–144)
SODIUM BLD-SCNC: 126 MMOL/L (ref 135–144)
SODIUM BLD-SCNC: 128 MMOL/L (ref 135–144)
TIME, STOOL #1: 1025
TIME, STOOL #2: 1500
TOTAL PROTEIN: 4.4 G/DL (ref 6.4–8.3)
TSH SERPL DL<=0.05 MIU/L-ACNC: 2.09 UIU/ML (ref 0.3–5)
WBC # BLD: 4.5 K/UL (ref 3.5–11)
WBC # BLD: 5.5 K/UL (ref 3.5–11)
ZZ NTE CLEAN UP: ORDERED TEST: NORMAL
ZZ NTE CLEAN UP: ORDERED TEST: NORMAL
ZZ NTE WITH NAME CLEAN UP: SPECIMEN SOURCE: NORMAL
ZZ NTE WITH NAME CLEAN UP: SPECIMEN SOURCE: NORMAL

## 2022-06-03 PROCEDURE — 86920 COMPATIBILITY TEST SPIN: CPT

## 2022-06-03 PROCEDURE — P9016 RBC LEUKOCYTES REDUCED: HCPCS

## 2022-06-03 PROCEDURE — 82270 OCCULT BLOOD FECES: CPT

## 2022-06-03 PROCEDURE — 6370000000 HC RX 637 (ALT 250 FOR IP): Performed by: STUDENT IN AN ORGANIZED HEALTH CARE EDUCATION/TRAINING PROGRAM

## 2022-06-03 PROCEDURE — 36415 COLL VENOUS BLD VENIPUNCTURE: CPT

## 2022-06-03 PROCEDURE — 85025 COMPLETE CBC W/AUTO DIFF WBC: CPT

## 2022-06-03 PROCEDURE — 2580000003 HC RX 258: Performed by: INTERNAL MEDICINE

## 2022-06-03 PROCEDURE — 86901 BLOOD TYPING SEROLOGIC RH(D): CPT

## 2022-06-03 PROCEDURE — A4216 STERILE WATER/SALINE, 10 ML: HCPCS | Performed by: NURSE PRACTITIONER

## 2022-06-03 PROCEDURE — 80051 ELECTROLYTE PANEL: CPT

## 2022-06-03 PROCEDURE — 2580000003 HC RX 258: Performed by: NURSE PRACTITIONER

## 2022-06-03 PROCEDURE — 93010 ELECTROCARDIOGRAM REPORT: CPT | Performed by: INTERNAL MEDICINE

## 2022-06-03 PROCEDURE — 2060000000 HC ICU INTERMEDIATE R&B

## 2022-06-03 PROCEDURE — 36430 TRANSFUSION BLD/BLD COMPNT: CPT

## 2022-06-03 PROCEDURE — 85014 HEMATOCRIT: CPT

## 2022-06-03 PROCEDURE — 6360000002 HC RX W HCPCS: Performed by: INTERNAL MEDICINE

## 2022-06-03 PROCEDURE — 99291 CRITICAL CARE FIRST HOUR: CPT | Performed by: INTERNAL MEDICINE

## 2022-06-03 PROCEDURE — C9113 INJ PANTOPRAZOLE SODIUM, VIA: HCPCS | Performed by: NURSE PRACTITIONER

## 2022-06-03 PROCEDURE — 86900 BLOOD TYPING SEROLOGIC ABO: CPT

## 2022-06-03 PROCEDURE — 86850 RBC ANTIBODY SCREEN: CPT

## 2022-06-03 PROCEDURE — 6360000002 HC RX W HCPCS: Performed by: NURSE PRACTITIONER

## 2022-06-03 PROCEDURE — 2580000003 HC RX 258: Performed by: STUDENT IN AN ORGANIZED HEALTH CARE EDUCATION/TRAINING PROGRAM

## 2022-06-03 PROCEDURE — 85018 HEMOGLOBIN: CPT

## 2022-06-03 PROCEDURE — 84443 ASSAY THYROID STIM HORMONE: CPT

## 2022-06-03 PROCEDURE — 99222 1ST HOSP IP/OBS MODERATE 55: CPT | Performed by: INTERNAL MEDICINE

## 2022-06-03 PROCEDURE — 85027 COMPLETE CBC AUTOMATED: CPT

## 2022-06-03 PROCEDURE — 80053 COMPREHEN METABOLIC PANEL: CPT

## 2022-06-03 PROCEDURE — 93005 ELECTROCARDIOGRAM TRACING: CPT | Performed by: STUDENT IN AN ORGANIZED HEALTH CARE EDUCATION/TRAINING PROGRAM

## 2022-06-03 RX ORDER — SODIUM CHLORIDE 9 MG/ML
INJECTION, SOLUTION INTRAVENOUS PRN
Status: DISCONTINUED | OUTPATIENT
Start: 2022-06-03 | End: 2022-06-03

## 2022-06-03 RX ORDER — OCTREOTIDE ACETATE 50 UG/ML
50 INJECTION, SOLUTION INTRAVENOUS; SUBCUTANEOUS ONCE
Status: DISCONTINUED | OUTPATIENT
Start: 2022-06-03 | End: 2022-06-03

## 2022-06-03 RX ORDER — OCTREOTIDE ACETATE 50 UG/ML
50 INJECTION, SOLUTION INTRAVENOUS; SUBCUTANEOUS ONCE
Status: COMPLETED | OUTPATIENT
Start: 2022-06-03 | End: 2022-06-03

## 2022-06-03 RX ORDER — SODIUM CHLORIDE 9 MG/ML
INJECTION, SOLUTION INTRAVENOUS PRN
Status: DISCONTINUED | OUTPATIENT
Start: 2022-06-03 | End: 2022-06-05

## 2022-06-03 RX ORDER — 0.9 % SODIUM CHLORIDE 0.9 %
1000 INTRAVENOUS SOLUTION INTRAVENOUS ONCE
Status: COMPLETED | OUTPATIENT
Start: 2022-06-03 | End: 2022-06-03

## 2022-06-03 RX ORDER — DEXTROSE MONOHYDRATE 50 MG/ML
INJECTION, SOLUTION INTRAVENOUS CONTINUOUS
Status: DISCONTINUED | OUTPATIENT
Start: 2022-06-03 | End: 2022-06-04

## 2022-06-03 RX ADMIN — DEXTROSE MONOHYDRATE: 50 INJECTION, SOLUTION INTRAVENOUS at 12:38

## 2022-06-03 RX ADMIN — MIDODRINE HYDROCHLORIDE 5 MG: 5 TABLET ORAL at 04:18

## 2022-06-03 RX ADMIN — OCTREOTIDE ACETATE 50 MCG/HR: 500 INJECTION, SOLUTION INTRAVENOUS; SUBCUTANEOUS at 16:35

## 2022-06-03 RX ADMIN — SODIUM CHLORIDE, PRESERVATIVE FREE 40 MG: 5 INJECTION INTRAVENOUS at 14:30

## 2022-06-03 RX ADMIN — FUROSEMIDE 20 MG: 20 TABLET ORAL at 20:48

## 2022-06-03 RX ADMIN — SODIUM CHLORIDE, PRESERVATIVE FREE 10 ML: 5 INJECTION INTRAVENOUS at 09:42

## 2022-06-03 RX ADMIN — SODIUM CHLORIDE, PRESERVATIVE FREE 10 ML: 5 INJECTION INTRAVENOUS at 20:49

## 2022-06-03 RX ADMIN — SODIUM CHLORIDE 1000 ML: 9 INJECTION, SOLUTION INTRAVENOUS at 01:57

## 2022-06-03 RX ADMIN — LACTULOSE 10 G: 20 SOLUTION ORAL at 09:41

## 2022-06-03 RX ADMIN — OCTREOTIDE ACETATE 50 MCG: 50 INJECTION, SOLUTION INTRAVENOUS; SUBCUTANEOUS at 16:33

## 2022-06-03 RX ADMIN — ATORVASTATIN CALCIUM 20 MG: 20 TABLET, FILM COATED ORAL at 20:48

## 2022-06-03 RX ADMIN — FUROSEMIDE 20 MG: 20 TABLET ORAL at 09:41

## 2022-06-03 ASSESSMENT — ENCOUNTER SYMPTOMS
ABDOMINAL PAIN: 0
VOICE CHANGE: 0
NAUSEA: 0
BLOOD IN STOOL: 0
VOMITING: 0
CHOKING: 0
BACK PAIN: 0
TROUBLE SWALLOWING: 0
COUGH: 0
ANAL BLEEDING: 0
RECTAL PAIN: 0
DIARRHEA: 0
SHORTNESS OF BREATH: 1
SHORTNESS OF BREATH: 0
NAUSEA: 1
APNEA: 0
ABDOMINAL DISTENTION: 1
CONSTIPATION: 0
WHEEZING: 0
SORE THROAT: 0

## 2022-06-03 ASSESSMENT — PAIN SCALES - GENERAL: PAINLEVEL_OUTOF10: 0

## 2022-06-03 NOTE — PLAN OF CARE
PRE-CONSULT ROUNDING NOTE    HPI    29-year-old male sent to ED for abnormal labs. Labs showed hyponatremia, . Patient has PMHX of hepatitis C, Cirrhosis, EtOH abuse, esophageal cancer, esophageal varices, HTN. Hgb on admission was 8.2. Today, repeat hgb 5.9. No overt GI bleeding. Stool was negative for occult blood. He is currently receiving 1 unit of PRBCs. Patient had EGD in April this year. Noted hypervascularity in the esophagus/stomach 2/2 radiation. No annamaria esophageal or gastric varices were seen. Also noted a 5 cm polypoidal lesion near the GE junction. Bx not obtained. Had colonoscopy in April 2022 noting small hemorrhoids, inflamed mucosa in sigmoid colon. Pathology revealed adenoma with low-grade dysplasia. In the R colon noted small tubular adenoma. At present patient is stable  No abdominal pain, nausea, vomiting  He tolerated breakfast well  Has abdominal distention  Last paracentesis 4/27/22  Has hypotension  afebrile    Review of Systems   Constitutional: Positive for fatigue. Negative for appetite change and fever. HENT: Negative for mouth sores, sore throat, trouble swallowing and voice change. Eyes:        No ictirus   Respiratory: Negative for apnea, cough, choking, shortness of breath and wheezing. Cardiovascular: Negative for chest pain, palpitations and leg swelling. Gastrointestinal: Positive for abdominal distention and nausea. Negative for abdominal pain, anal bleeding, blood in stool, constipation, diarrhea, rectal pain and vomiting. Endocrine: Negative for polydipsia, polyphagia and polyuria. Genitourinary: Negative for frequency, hematuria and urgency. Musculoskeletal: Negative for arthralgias, back pain, gait problem and joint swelling. Skin: Negative for pallor and rash. Allergic/Immunologic: Negative for food allergies. Neurological: Negative for dizziness, seizures, weakness and headaches. Hematological: Negative for adenopathy. tolerates  -Nadolol  -Lactulose

## 2022-06-03 NOTE — CONSULTS
NEPHROLOGY CONSULT       Reason for Consult: Severe hyponatremia      Chief Complaint: Fatigue and poor appetite. History Obtained From: Patient and EHR records. History of Present Illness: This is a 57 y.o. male with history of alcoholic liver disease, cirrhosis complicated by portal hypertension and ascites, history of esophageal cancer 1 year ago status post chemoradiation therapy. He [facility] with fatigue and nausea over the last 1 week. He indicated poor appetite, dull  abdominal ache, muscle weakness and unsteady gait. He denied any headaches or seizures. He was found on laboratory studies to have a serum sodium of 119 mmol/L[baseline serum sodium  128 to 132 mmol/L]. He has been keeping to a fluid restriction and is on Aldactone 100 mg 3 times daily and Lasix 40 mg 3 times daily. Denies any use of thiazide diuretics. Home medication includes Prozac-SSRI. He denies any decrease in urine output. This morning he was noted to hypotensive with hemoglobin of  5.9 gm/dl. He currently is on 0.9 saline at 75 cc/h and serum sodium has been decreased to 128 mmol/L. Denies any rectal bleeding or melena.     Past Medical History:        Diagnosis Date    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 Nelly Wei, orthopedic, every 3-4 months, gets steroid injection    Lezama esophagus     BPH (benign prostatic hypertrophy)     Cholelithiasis     Cirrhosis ([address]     COVID-19 12/2020    pt reports he had a positive test while at [facility] in 2020, was asymptomatic    COVID-19 vaccine series completed 5/20/2021, 6/22/2021    Moderna 5/20/2021, 6/22/2021    DDD (degenerative disc disease), lumbar     Depression     Esophageal cancer (HCC)     INVASIVE ADENOCARCINOMA ARISING IN TUBULAR ADENOMA WITH HIGH GRADE DYSPLASIA, ASSOCIATED WITH FOCAL INTESTINAL METAPLASIA     Esophageal varices ([address]     Fatty liver     GERD STEROID INJECTION LEFT L4 L5 performed by Dorcas Puri MD at [address] Left 7/20/2020    LEFT L4 L5 EPIDURAL STEROID INJECTION performed by Dorcas Puri MD at [address] Bilateral 8/17/2020    LUMBAR FACET BILATERAL L2-L5 performed by Dorcas Puri MD at [address] Bilateral 12/7/2020    NERVE BLOCK BILATERAL LUMBAR MEDIAL BRANCH L2-L5 performed by Dorcas Puri MD at [address] AA&/STRD TFRML EPI LUMBAR/SACRAL 1 LEVEL Bilateral 9/6/2018    BILATERAL L5 CACHORRO performed by Dorcas Puri MD at [address] AA&/STRD TFRML EPI LUMBAR/SACRAL 1 LEVEL N/A 9/28/2018    BILATERAL L5 CACHORRO performed by Dorcas Puri MD at [address] N/CARPAL TUNNEL SURG Right 8/29/2017    CARPAL TUNNEL RELEASE RIGHT performed by Susana Anders MD at [address] N/CARPAL TUNNEL SURG Left 10/31/2017    CARPAL TUNNEL RELEASE performed by Susana Anders MD at [facility] 12/29/2020    EGD BIOPSY performed by Zohra Diego MD at [address] [place] [address] N/A 2/2/2021    EGD BIOPSY and spot marking performed by Josue Reyes MD at [address] [place] [address] 2/12/2021    ENDOSCOPIC ULTRASOUND, EGD performed by Chai Sanabria MD at [address] [place] [address]  2/12/2021    EGD DIAGNOSTIC ONLY performed by Chai Sanabria MD at [address] [place] [address] N/A 8/31/2021    EGD BIOPSY performed by Josue Reyes MD at [address] [place] [address] 1/21/2022    EGD BIOPSY performed by Josue Reyes MD at [address] [place] [address] 4/15/2022    EGD ESOPHAGOGASTRODUODENOSCOPY performed by Zohra Diego MD at [facility]         Current Medications:    0.9 % sodium chloride infusion, PRN  atorvastatin (LIPITOR) tablet 20 mg, Nightly  furosemide (LASIX) tablet 20 mg, BID  lactulose (CHRONULAC) 10 GM/15ML solution 10 g, TID  midodrine (PROAMATINE) tablet 5 mg, TID PRN  nadolol (CORGARD) tablet 20 mg, Daily  [Held by provider] spironolactone (ALDACTONE) tablet 50 mg, QPM  sodium chloride flush 0.9 % injection 5-40 mL, 2 times per day  sodium chloride flush 0.9 % injection 5-40 mL, PRN  0.9 % sodium chloride infusion, PRN  [Held by provider] enoxaparin (LOVENOX) injection 40 mg, Daily  ondansetron (ZOFRAN-ODT) disintegrating tablet 4 mg, Q8H PRN   Or  ondansetron (ZOFRAN) injection 4 mg, Q6H PRN  polyethylene glycol (GLYCOLAX) packet 17 g, Daily PRN  0.9 % sodium chloride infusion, Continuous        Allergies:  Patient has no known allergies.     Social History:   Social History     Socioeconomic History    Marital status: Single     Spouse name: Not on file    Number of children: Not on file    Years of education: Not on file    Highest education level: Not on file   Occupational History    Not on file   Tobacco Use    Smoking status: Former Smoker     Packs/day: 1.00     Years: 45.00     Pack years: 45.00     Quit date: 2017     Years since quittin.3    Smokeless tobacco: Never Used   Vaping Use    Vaping Use: Never used   Substance and Sexual Activity    Alcohol use: Not Currently     Comment: quit     Drug use: Not Currently     Frequency: 1.0 times per week     Comment: cocaine,  stopped spring 2016    Sexual activity: Yes     Partners: Female   Other Topics Concern    Not on file   Social History Narrative     in the past, retired     Social Determinants of Health     Financial Resource Strain: [address] Difficulty of Paying Living Expenses: Not hard at all   Food Insecurity: No Food Insecurity    Worried About [address] in the Last Year: Never true   [address] in the Last Year: Never true   Transportation Needs:     Lack of Transportation (Medical): 24 23 24   BUN 17  --  15  --   --  15  --    CREATININE 1.23*  --  1.11  --   --  0.95  --    GLUCOSE 77  --  124*  --   --  112*  --    CALCIUM 8.9  --  8.9  --   --  7.9*  --     < > = values in this interval not displayed. Phosphorus:  No results for input(s): PHOS in the last 72 hours. Magnesium: No results for input(s): MG in the last 72 hours. Albumin:   Recent Labs     06/01/22  1225 06/02/22  1030 06/03/22  0440   LABALBU 3.0* 3.1* 2.3*       IRON:    Lab Results   Component Value Date    IRON 79 04/13/2022     Iron Saturation:  No components found for: PERCENTFE  TIBC:    Lab Results   Component Value Date    TIBC 280 04/13/2022     FERRITIN:    Lab Results   Component Value Date    FERRITIN 20 04/13/2022     SPEP:   Lab Results   Component Value Date    PROT 4.4 06/03/2022     UPEP: No results found for: TPU     C3: No results found for: C3  C4: No results found for: C4  MPO ANCA:  No results found for: MPO .   PR3 ANCA:  No results found for: PR3  Urine Sodium:    Lab Results   Component Value Date    GAVIN <20 06/02/2022      Urine Potassium:    Lab Results   Component Value Date    KUR 39.7 07/20/2016     Urine Chloride:  No results found for: CLU  Urine Ph:  No components found for: PO4U  Urine Osmolarity:    Lab Results   Component Value Date    OSMOU 116 06/02/2022     Urine Creatinine:    Lab Results   Component Value Date    LABCREA 39.4 06/02/2022     Urine Eosinophils: No components found for: EOSU  Urine Protein:  No results found for: TPU  Urinalysis:  U/A:   Lab Results   Component Value Date    NITRU NEGATIVE 06/02/2022    COLORU Yellow 06/02/2022    PHUR 6.5 06/02/2022    WBCUA 0 TO 2 04/26/2022    RBCUA 0 TO 2 04/26/2022    MUCUS NOT REPORTED 12/21/2021    TRICHOMONAS NOT REPORTED 12/21/2021    YEAST NOT REPORTED 12/21/2021    BACTERIA None 04/26/2022    CLARITYU clear 05/05/2017    SPECGRAV 1.005 06/02/2022    LEUKOCYTESUR NEGATIVE 06/02/2022    UROBILINOGEN Normal 06/02/2022 BILIRUBINUR NEGATIVE 06/02/2022    BILIRUBINUR - 05/05/2017    BILIRUBINUR NEGATIVE 04/19/2012    BLOODU neg 05/05/2017    GLUCOSEU NEGATIVE 06/02/2022    GLUCOSEU NEGATIVE 04/19/2012    [address] NEGATIVE 06/02/2022    AMORPHOUS 1+ 04/14/2022         Radiology:  Reviewed as available. Assessment:  1. Acute on chronic hypotonic hyponatremia with recent worsening to  119 mmol/l. Urine sodium is less than 20 and urine osmolality 119, likely secondary to decreased effective arterial volume and liver disease + decrease in solute intake due to poor oral intake. SIADH is a differential with the use of Prozac but not supported by low urine sodium and urine osmolarity    2. Chronic liver disease complicated by cirrhosis and ascites[abdominal paracentesis 1 month ago with removal of 11 L]    3. Acute kidney injury secondary to prerenal component from diuretic-nonoliguric    4. Acute anemia-rule out blood loss       Plan:  1. Discontinue 0.9 saline and start D5 water at 50 cc/hour to avoid fast correction. Check serum and urine osmolarity  Send random  urine electrolytes and urine creatinine  TSH ,cortisol and uric acid to complete work up. Check serum sodium/electrolytes every 4 hrs and then 6 hrly from 12 pm today  Monitor strict urine output  Oral Fluid restriction of 1500 mls/day  Transfuse 1 unit of packed red blood cells  Ideal correction should be 4-6 meq/l in 24 hours and serum sodium correction should not exceed 10 meq in 24 hrs. Resume Aldactone and Lasix once hemodynamically stable    Thank you for the consultation.       Electronically signed by Ludmila Sanders MD on 6/3/2022 at 11:49 AM

## 2022-06-03 NOTE — CONSULTS
GI Consult Note:    Name: Felice Veliz  MRN: [id number]     [place]: [de-identified]  Room: 2013/2013-01    Admit Date: 6/2/2022  PCP: VASU Bejarano    Physician Requesting Consult: Arpit Sung MD     Reason for Consult: Anemia, sudden drop of hemoglobin. Chief Complaint:     Chief Complaint   Patient presents with    Abnormal Lab       History Obtained From:     patient, electronic medical record, nursing staff    History of Present Illness:      Felice Veliz is a  57 y.o.  male who presents with Abnormal Lab      Symptoms:  Patient seen at [facility] intensive care unit. Patient is known to have stage II esophageal cancer which was treated with radiation and chemo as patient was not a surgical candidate. Patient is known to have cirrhosis of the liver, portal hypertension, esophageal varices, significant portal hypertensive gastropathy. Patient also known to have Lezama's esophagitis       Patient had hepatitis C which was treated in the past with Caprice Orange. He is also chronic alcoholic and recently stopped drinking. Known to have cirrhosis of the liver secondary to probable alcoholism. Recently there is a difficulty managing ascites with the diuretic therapy. He had a labs drawn couple of days ago and was found to have hyponatremia up to 119. At that time that was on 6/1/2022 hemoglobin 7.6 which was his baseline. He was advised to come to the emergency room for further management of hyponatremia. On 6/2/2022 hemoglobin was 8.2. Suddenly today he was a found to have hemoglobin 5.9. He does not have signs and symptoms of acute GI blood loss including melena, hematochezia, nausea vomiting. He is tolerating diet well. In fact he has a full breakfast today. Bowel movements normal color per patient. During my visit at about 12:30 PM patient was doing well and comfortable. He already was getting 1 unit of transfusion.     Has a multiple other medical issues reviewed. Also known to have cholelithiasis, asymptomatic. Also reviewed GI APRN notes which is as follows. Had a colonoscopy done in March 2022 and was found to have inflamed mucosa in the sigmoid colon extensive biopsies revealed inflammation, mild dysplasia. This needs to be reevaluated in the next 6 months. He also had a EGD done by Dr. Casie Portillo in April 2022 and found to have gastritis. In the past he was a found to have significant portal hypertensive gastropathy. Biopsies from the lower esophagus at the site of previous malignancy did not reveal recurrent malignancy.     41-year-old male sent to ED for abnormal labs. Labs showed hyponatremia, . Patient has PMHX of hepatitis C, Cirrhosis, EtOH abuse, esophageal cancer, esophageal varices, HTN. Hgb on admission was 8.2. Today, repeat hgb 5.9. No overt GI bleeding. Stool was negative for occult blood. He is currently receiving 1 unit of PRBCs. Patient had EGD in April this year. Noted hypervascularity in the esophagus/stomach 2/2 radiation. No annamaria esophageal or gastric varices were seen. Also noted a 5 cm polypoidal lesion near the GE junction. Bx not obtained. Had colonoscopy in April 2022 noting small hemorrhoids, inflamed mucosa in sigmoid colon. Pathology revealed adenoma with low-grade dysplasia. In the R colon noted small tubular adenoma.   At present patient is stable  No abdominal pain, nausea, vomiting  He tolerated breakfast well  Has abdominal distention  Last paracentesis 4/27/22  Has hypotension    Past Medical History:     Past Medical History:   Diagnosis Date    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 ([facility]     Anxiety     Arthritis     Back pain, chronic     dr. Osbaldo Roman, orthopedic, every 3-4 months, gets steroid injection    Lezama esophagus     BPH (benign prostatic hypertrophy)     Cholelithiasis     Cirrhosis ([address]     COVID-19 12/2020    pt reports he had a positive test while at [facility] in 2020, was asymptomatic    COVID-19 vaccine series completed 5/20/2021, 6/22/2021    Moderna 5/20/2021, 6/22/2021    DDD (degenerative disc disease), lumbar     Depression     Esophageal cancer ([facility] [address]     INVASIVE ADENOCARCINOMA ARISING IN TUBULAR ADENOMA WITH HIGH GRADE DYSPLASIA, ASSOCIATED WITH FOCAL INTESTINAL METAPLASIA     Esophageal varices ([address]     Fatty liver     GERD (gastroesophageal reflux disease)     Hep C w/o coma, chronic (HCC)     History of alcohol abuse     6-12 beers a day; quit drinking July 2016    History of blood transfusion     History of colon polyps 2016    History of tobacco abuse     Grindstone (hard of hearing)     Hyperlipidemia     Hypertension     Port-A-Cath in place     right upper chest    Sciatica     Spinal stenosis     Stomach ulcer     hx of    Tubular adenoma of colon 2016, 2018    Vitamin D deficiency     Wears glasses         Past Surgical History:     Past Surgical History:   Procedure Laterality Date    BUNIONECTOMY      twice on right side    BUNIONECTOMY Left     CARPAL TUNNEL RELEASE Right     COLONOSCOPY      at age 35    COLONOSCOPY  10/05/2016    polyps-pathology tubular adenoma, and abnormal looking mucosa right colon-pathology-tubular adenoma    COLONOSCOPY N/A 3/30/2018    COLONOSCOPY POLYPECTOMY COLD BIOPSY performed by Orlando Freeman MD at [address] [place] [address]  03/30/2018    Small polyp in the sigmoid colon and excised with biopsy forceps--tubular adenoma    COLONOSCOPY N/A 4/16/2022    COLONOSCOPY POLYPECTOMY performed by Orlando Freeman MD at [address] [place] [address], DIAGNOSTIC      EGD    IR PORT PLACEMENT EQUAL OR GREATER THAN 5 YEARS  4/19/2021    IR PORT PLACEMENT EQUAL OR GREATER THAN 5 YEARS 4/19/2021 [facility]ED[facility]    KNEE SURGERY Left     cyst removed    NASAL SEPTUM SURGERY      NERVE BLOCK Right 11/23/2020    NERVE BLOCK RIGHT CERVICAL STEROID INJECTION  C3-C6 performed by Jena Hobbs MD at [facility] OTHER SURGICAL HISTORY  01/04/16    steroid injection C7 T1    OTHER SURGICAL HISTORY  11/21/2016    Bilateral Lumbar CACHORRO L4-L5 injections    OTHER SURGICAL HISTORY  12/19/2016    lumbar steroid injection    OTHER SURGICAL HISTORY  09/28/2018    BILATERAL L5 CACHORRO (N/A Back)    OTHER SURGICAL HISTORY Right 11/23/2020    cervical injection    PAIN MANAGEMENT PROCEDURE Left 7/9/2020    EPIDURAL STEROID INJECTION LEFT L4 L5 performed by Meli Osorio MD at [address] [place] [address] Left 7/20/2020    LEFT L4 L5 EPIDURAL STEROID INJECTION performed by Meli Osorio MD at [address] [place] [address] Bilateral 8/17/2020    LUMBAR FACET BILATERAL L2-L5 performed by Meli Osorio MD at [address] [place] [address] Bilateral 12/7/2020    NERVE BLOCK BILATERAL LUMBAR MEDIAL BRANCH L2-L5 performed by Meli Osorio MD at [address] AA&/STRD TFRML EPI LUMBAR/SACRAL 1 LEVEL Bilateral 9/6/2018    BILATERAL L5 CACHORRO performed by Meli Osorio MD at [address] AA&/STRD TFRML EPI LUMBAR/SACRAL 1 LEVEL N/A 9/28/2018    BILATERAL L5 CACHORRO performed by Meli Osorio MD at [address] [place] [address] N/CARPAL TUNNEL SURG Right 8/29/2017    CARPAL TUNNEL RELEASE RIGHT performed by Lorene Sam MD at [address] [place] [address] N/CARPAL TUNNEL SURG Left 10/31/2017    CARPAL TUNNEL RELEASE performed by Lorene Sam MD at [facility] 12/29/2020    EGD BIOPSY performed by Lavern Reynoso MD at [address] [place] [address] 2/2/2021    EGD BIOPSY and spot marking performed by Nitesh Hernández MD at [address] [place] [address] N/A 2/12/2021    ENDOSCOPIC ULTRASOUND, EGD performed by Tony Garcia MD at [address] [place] [address]  2/12/2021    EGD DIAGNOSTIC ONLY performed by Tony Garcia MD at [address] [place] [address] 8/31/2021    EGD BIOPSY performed by Isaiah Elliott MD at [address] [place] [address] 1/21/2022    EGD BIOPSY performed by Isaiah Elliott MD at [facility] N/A 4/15/2022    EGD ESOPHAGOGASTRODUODENOSCOPY performed by Josue Garcia MD at [facility]          Medications Prior to Admission:       Prior to Admission medications    Medication Sig Start Date End Date Taking? Authorizing Provider   furosemide (LASIX) 40 MG tablet Take 40 mg by mouth 3 times daily   Yes Historical Provider, MD   spironolactone (ALDACTONE) 100 MG tablet Take 200 mg by mouth 3 times daily    Yes Historical Provider, MD   vitamin D (CHOLECALCIFEROL) 25 MCG (1000 UT) TABS tablet Take 1,000 Units by mouth daily   Yes Historical Provider, MD   ondansetron (ZOFRAN-ODT) 4 MG disintegrating tablet dissolve 1 tablet by mouth every 8 hours if needed for nausea and vomiting 5/31/22   VASU Negro   lactulose [facility]) 10 GM/15ML solution take 30 milliliters by mouth three times a day 5/31/22   VASU Negro   nadolol (CORGARD) 20 MG tablet take 1 tablet by mouth once daily 5/6/22   Historical Provider, MD   FLUoxetine (PROZAC) 20 MG capsule Take 1 capsule by mouth daily 4/21/22   Javier Love MD   atorvastatin (LIPITOR) 20 MG tablet Take 1 tablet by mouth nightly 4/21/22   Javier Love MD   midodrine (PROAMATINE) 5 MG tablet Take 1 tablet by mouth 3 times daily as needed (SBP <110) 4/21/22   Javier Love MD   ferrous sulfate (IRON 325) 325 (65 Fe) MG tablet Take 1 tablet by mouth 2 times daily 1/12/22   VASU Negro   omeprazole (PRILOSEC) 40 MG delayed release capsule take 1 capsule by mouth EVERY MORNING BEFORE BREAKFAST 11/12/21   Des Mccurdy MD        Allergies:       Patient has no known allergies. Social History:     Tobacco:    reports that he quit smoking about 5 years ago. He has a 45.00 pack-year smoking history.  He has never used smokeless tobacco.  Alcohol:      reports previous alcohol use. Drug Use: reports previous drug use. Frequency: 1.00 time per week. Family History:     Family History   Problem Relation Age of Onset   Combs Cancer Mother         pancreatic    Cancer Father         bone    Diabetes Sister     Asthma Brother        Review of Systems:     Review of Systems   Constitutional: Positive for fatigue. Negative for appetite change and fever. HENT: Negative for mouth sores, sore throat, trouble swallowing and voice change. Eyes:        No ictirus   Respiratory: Positive for shortness of breath. Negative for apnea, cough, choking and wheezing. Cardiovascular: Negative for chest pain, palpitations and leg swelling. Gastrointestinal: Positive for abdominal distention. Negative for abdominal pain, blood in stool, constipation, nausea and vomiting. Endocrine: Negative for polydipsia, polyphagia and polyuria. Genitourinary: Negative for frequency, hematuria and urgency. Musculoskeletal: Negative for arthralgias, back pain, gait problem and joint swelling. Skin: Negative for pallor and rash. Allergic/Immunologic: Negative for food allergies. Neurological: Positive for weakness. Negative for dizziness, seizures and headaches. Hematological: Negative for adenopathy. Does not bruise/bleed easily. Psychiatric/Behavioral: Positive for behavioral problems. Negative for sleep disturbance. The patient is nervous/anxious. Code Status:  Full Code    Physical Exam:     Vitals:  BP (!) 102/53   Pulse 83   Temp 97.9 °F (36.6 °C) (Oral)   Resp 16   Ht 5' 10\" (1.778 m)   Wt 200 lb (90.7 kg)   SpO2 100%   BMI 28.70 kg/m²   Temp (24hrs), Av.2 °F (36.8 °C), Min:97.9 °F (36.6 °C), Max:98.4 °F (36.9 °C)      Physical Exam  Vitals and nursing note reviewed. Constitutional:       General: He is not in acute distress. Appearance: He is well-developed. Comments: Mild malnutrition. ,  Patient is awake, alert and oriented. HENT:      Head: Normocephalic and atraumatic. Mouth/Throat:      Pharynx: No oropharyngeal exudate. Eyes:      General: No scleral icterus. Conjunctiva/sclera: Conjunctivae normal.      Pupils: Pupils are equal, round, and reactive to light. Neck:      Thyroid: No thyromegaly. Trachea: No tracheal deviation. Cardiovascular:      Rate and Rhythm: Normal rate and regular rhythm. Heart sounds: Normal heart sounds. No murmur heard. Pulmonary:      Effort: Pulmonary effort is normal. No respiratory distress. Breath sounds: Normal breath sounds. No wheezing or rales. Abdominal:      General: Bowel sounds are normal. There is distension. Palpations: Abdomen is soft. There is no hepatomegaly or mass. Tenderness: There is no abdominal tenderness. There is no guarding or rebound. Hernia: No hernia is present. Genitourinary:     Rectum: Normal.   Musculoskeletal:      Cervical back: Normal range of motion and neck supple. Lymphadenopathy:      Cervical: No cervical adenopathy. Skin:     General: Skin is warm and dry. Findings: No erythema or rash. Neurological:      Mental Status: He is alert and oriented to person, place, and time. Cranial Nerves: No cranial nerve deficit. Psychiatric:         Thought Content:  Thought content normal.       Data:   CBC:   Lab Results   Component Value Date    WBC 5.5 06/03/2022    RBC 2.58 06/03/2022    RBC 4.75 04/19/2012    HGB 7.2 06/03/2022    HCT 22.8 06/03/2022    MCV 88.4 06/03/2022    MCH 28.0 06/03/2022    MCHC 31.6 06/03/2022    RDW 15.9 06/03/2022     06/03/2022     04/19/2012    MPV 6.8 06/03/2022     CBC with Differential:  Lab Results   Component Value Date    WBC 5.5 06/03/2022    RBC 2.58 06/03/2022    RBC 4.75 04/19/2012    HGB 7.2 06/03/2022    HCT 22.8 06/03/2022     06/03/2022     04/19/2012    MCV 88.4 06/03/2022    MCH 28.0 06/03/2022    MCHC 31.6 06/03/2022 RDW 15.9 06/03/2022    NRBC 1 06/03/2022    LYMPHOPCT 8 06/03/2022    MONOPCT 10 06/03/2022    MYELOPCT 1 06/01/2021    BASOPCT 0 06/03/2022    MONOSABS 0.45 06/03/2022    LYMPHSABS 0.36 06/03/2022    EOSABS 0.04 06/03/2022    BASOSABS 0.00 06/03/2022    DIFFTYPE NOT REPORTED 02/02/2022     Hemoglobin/Hematocrit:  Lab Results   Component Value Date    HGB 7.2 06/03/2022    HCT 22.8 06/03/2022     CMP:    Lab Results   Component Value Date     06/03/2022    K 3.6 06/03/2022    CL 95 06/03/2022    CO2 24 06/03/2022    BUN 15 06/03/2022    CREATININE 0.95 06/03/2022    GFRAA >60 06/03/2022    LABGLOM >60 06/03/2022    GLUCOSE 112 06/03/2022    GLUCOSE 65 04/19/2012    PROT 4.4 06/03/2022    LABALBU 2.3 06/03/2022    LABALBU 4.7 04/19/2012    CALCIUM 7.9 06/03/2022    BILITOT 1.37 06/03/2022    ALKPHOS 101 06/03/2022    AST 21 06/03/2022    ALT 10 06/03/2022     BMP:  Lab Results   Component Value Date     06/03/2022    K 3.6 06/03/2022    CL 95 06/03/2022    CO2 24 06/03/2022    BUN 15 06/03/2022    LABALBU 2.3 06/03/2022    LABALBU 4.7 04/19/2012    CREATININE 0.95 06/03/2022    CALCIUM 7.9 06/03/2022    GFRAA >60 06/03/2022    LABGLOM >60 06/03/2022    GLUCOSE 112 06/03/2022    GLUCOSE 65 04/19/2012     PT/INR:    Lab Results   Component Value Date    PROTIME 15.5 04/26/2022    INR 1.2 04/26/2022     PTT:    Lab Results   Component Value Date    APTT 34.6 04/26/2022   [APTT}    Assesment:     Primary Problem  Hyponatremia    Active Hospital Problems    Diagnosis Date Noted    Drop in hemoglobin [R71.0] 06/03/2022     Priority: Medium    Cirrhosis ([facility] [address] [K74.60]     Hyponatremia [E87.1] 07/20/2016       Plan:     Patient has had an drop of hemoglobin etiology not clear. As he is known to have significant portal hypertensive gastropathy, it is possible that he last bled secondary to portal hypertension. We will keep him on liquid diet. We will start octreotide.     If he continues to significant issues managing ascites and a stabilizing hemoglobin, may need TIPS procedure. Discussed with the nursing staff. Late patient in the morning. Thank you for allowing me to participate in the care of your patient. Please feel free to contact me with anyquestions or concerns. Electronically signed by Ari Luis MD on 6/3/2022 at 3:03 PM     Copy sent to VASU Servin    Note is dictated utilizing voice recognition software. Unfortunately this leads to occasional typographical errors. Please contact our office if you have any questions.

## 2022-06-03 NOTE — CARE COORDINATION
CASE MANAGEMENT NOTE:    Admission Date:  6/2/2022 Petty Casarez is a 57 y.o.  male    Admitted for : Hyponatremia [E87.1]    Met with:  Patient    PCP:  Nicole LEONE                                Insurance:  [facility] [employer]      Is patient alert and oriented at time of discussion:  Yes    Current Residence/ Living Arrangements:  independently at home  1 story home,. Pt still drives. Current Services PTA:  Yes- Was with [facility]. Referral sent     Does patient go to outpatient dialysis: No  If yes, location and chair time: NA    Is patient agreeable to VNS: Yes    Freedom of choice provided:  Yes    List of [address] provided: No    VNS chosen:  Yes- [facility]    DME:  straight cane, walker and other GB    Home Oxygen: No    Nebulizer: No    CPAP/BIPAP: No    Supplier: N/A    Potential Assistance Needed: No    SNF needed: No    Freedom of choice and list provided: No    Pharmacy:  [facility]       Is patient currently receiving oral anticoagulation therapy? No    Is the Patient an NIHARIKA STEELE [facility] with Readmission Risk Score greater than 14%? No  If yes, pt needs a follow up appointment made within 7 days. Family Members/Caregivers that pt would like involved in their care:    Yes    If yes, list name here:  [address]     Transportation Provider:  Family             Discharge Plan:  6/3/2022 Jovana Recio From home alone , independent. DME:walker, cane and GB VNS: [facility], referral sent . Pt goes to [facility] pain management clinic for steroid injections. Pt was recently at [facility] in April 2022 . Last paracentesis 4/27 11L removed. Admitted with hyponatremia  on admit,  128 today.  LACY NEEDS TO BE SIGNED/COMPLETED//JF                Electronically signed by: Kevyn Garcia RN on 6/3/2022 at 4:18 PM

## 2022-06-03 NOTE — PROGRESS NOTES
[address]    PROGRESS NOTE             6/3/2022    8:26 AM    Name:   Abbey Sanchez  MRN:     [id number]     Acct:      [de-identified]   Room:   2013/2013-01  IP Day:  1  Admit Date:  6/2/2022 10:24 AM    PCP:  VASU Alexander  Code Status:  Full Code    Subjective:     C/C:   Chief Complaint   Patient presents with    Abnormal Lab     Interval History Status: worsened. Patient seen and examined at bedside. This morning patient's hemoglobin dropped from 8.2-5.9.  1 unit of PRBCs was ordered. FOBT also ordered. Hold Lovenox due to drop in hemoglobin. Patient's sodium trending up, this morning 125. Patient overnight was hypotensive with blood pressure in the 70s. This morning blood pressure 115/67. Patient received 1 dose of midodrine last night at 6 PM, and 1 more this morning around 4 AM.  Patient this morning has no complaints. Patient denies lightheadedness, headaches, dizziness, chest pain, shortness of breath and abdominal pain. Brief History:     Patient is a 78-year-old male with history of cirrhosis secondary to hep C, scented to the ED today due to abnormal lab results. Patient was seen by GI yesterday and had lab work ordered which came back this morning and showed hyponatremia of 119. Patient is complaining of mild symptoms such as nausea and fatigue but otherwise is asymptomatic. Patient denies chest pain, shortness of breath, dizziness, lightheadedness, shortness of breath and vomiting. Patient denies any blood in his stool or any changes in urination. Electrolyte panel done in the ED showed sodium of 119. CBC unremarkable. Patient's serum osmolarity is decreased at 259. Pressure in the ED 96/51. Nephrology was consulted in the ED. Review of Systems:     Review of Systems   Constitutional: Negative for chills and fever. Respiratory: Negative for cough and shortness of breath.     Cardiovascular: Negative for chest pain. Gastrointestinal: Negative for abdominal pain, constipation, diarrhea, nausea and vomiting. Genitourinary: Negative for dysuria. Neurological: Negative for dizziness and headaches. Medications: Allergies:  No Known Allergies    Current Meds:   Scheduled Meds:    atorvastatin  20 mg Oral Nightly    furosemide  20 mg Oral BID    lactulose  10 g Oral TID    nadolol  20 mg Oral Daily    [Held by provider] spironolactone  50 mg Oral QPM    sodium chloride flush  5-40 mL IntraVENous 2 times per day    [Held by provider] enoxaparin  40 mg SubCUTAneous Daily     Continuous Infusions:    sodium chloride      sodium chloride      sodium chloride 75 mL/hr at 06/03/22 0500     PRN Meds: sodium chloride, midodrine, sodium chloride flush, sodium chloride, ondansetron **OR** ondansetron, polyethylene glycol    Data:     Past Medical History:   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 ([facility] Anxiety, Arthritis, Back pain, chronic, Lezama esophagus, BPH (benign prostatic hypertrophy), Cholelithiasis, Cirrhosis ([facility] COVID-19, COVID-19 vaccine series completed, DDD (degenerative disc disease), lumbar, Depression, Esophageal cancer ([facility] Esophageal varices ([facility] Fatty liver, GERD (gastroesophageal reflux disease), Hep C w/o coma, chronic ([facility] History of alcohol abuse, History of blood transfusion, History of colon polyps, History of tobacco abuse, [place] (hard of hearing), Hyperlipidemia, Hypertension, Port-A-Cath in place, Sciatica, Spinal stenosis, Stomach ulcer, Tubular adenoma of colon, Vitamin D deficiency, and Wears glasses. Social History:   reports that he quit smoking about 5 years ago. He has a 45.00 pack-year smoking history. He has never used smokeless tobacco. He reports previous alcohol use. He reports previous drug use. Frequency: 1.00 time per week.      Family History:   Family History   Problem Relation Age of Onset    Cancer Mother pancreatic    Cancer Father         bone    Diabetes Sister     Asthma Brother        Vitals:  /67   Pulse 90   Temp 98.4 °F (36.9 °C) (Oral)   Resp 24   Ht 5' 10\" (1.778 m)   Wt 200 lb (90.7 kg)   SpO2 93%   BMI 28.70 kg/m²   Temp (24hrs), Av.2 °F (36.8 °C), Min:98 °F (36.7 °C), Max:98.4 °F (36.9 °C)    No results for input(s): POCGLU in the last 72 hours. I/O(24Hr): Intake/Output Summary (Last 24 hours) at 6/3/2022 0826  Last data filed at 6/3/2022 0500  Gross per 24 hour   Intake 1933.92 ml   Output 1075 ml   Net 858.92 ml       Labs:  [unfilled]    Lab Results   Component Value Date/Time    SPECIAL NOT REPORTED 2022 11:28 AM     Lab Results   Component Value Date/Time    CULTURE NO GROWTH 6 DAYS 2022 09:00 AM       [unfilled]    Radiology:    Marito Eckert FOR SURGICAL PROCEDURES    Result Date: 2022  Radiology result is complete; follow up with provider / physician office for radiology results         Physical Examination:        Physical Exam  Constitutional:       Appearance: Normal appearance. HENT:      Head: Normocephalic and atraumatic. Cardiovascular:      Rate and Rhythm: Normal rate and regular rhythm. Pulses: Normal pulses. Heart sounds: Normal heart sounds. No murmur heard. No friction rub. No gallop. Pulmonary:      Effort: Pulmonary effort is normal. No respiratory distress. Breath sounds: Normal breath sounds. No stridor. No wheezing or rhonchi. Abdominal:      General: Abdomen is flat. There is no distension. Palpations: Abdomen is soft. There is no mass. Tenderness: There is no abdominal tenderness. There is no guarding or rebound. Hernia: No hernia is present. Neurological:      Mental Status: He is alert.            Assessment:        Primary Problem  Hyponatremia    Active Hospital Problems    Diagnosis Date Noted    [facility]osis ([facility] [K74.60]     Hyponatremia [E87.1] 2016       Plan: 1. Hyponatremia  -Sodium level in the , this morning 125  -Nephrology consulted  -Urine osmolarity 116, within normal limits  -Urine creatinine normal  -Urine sodium normal  -Urinalysis unremarkable  -Urine osmolarity 259, decreased  -IV normal saline fluid running at 75 mL/h     2. Drop in hemoglobin secondary to cirrhosis versus GI bleed  -Hemoglobin yesterday 8.2, this morning 5.9  -1 unit of PRBCs ordered  -FOBT ordered  -Lovenox held  -GI consulted    3. Hypotension  -Midodrine 5 mg 3 times as needed              -Hold if systolic blood pressure less than 110          Eulogio Abad MD  6/3/2022  8:26 AM       Attestation and add on       I have discussed the care of Petty Casarez , including pertinent history and exam findings,      6/3/22    with the resident. I have seen and examined the patient and the key elements of all parts of the encounter have been performed by me . I agree with the assessment, plan and orders as documented by the resident. Hospital Problems           Last Modified POA    * (Principal) Hyponatremia 6/2/2022 Yes    Drop in hemoglobin 6/3/2022 Yes    Cirrhosis ([address] 6/2/2022 Yes              ---  Vitals:    06/03/22 1145 06/03/22 1200 06/03/22 1215 06/03/22 1300   BP: (!) 98/54 (!) 118/54 (!) 107/53 (!) 102/53   Pulse: 79 78  83   Resp: 16 19  16   Temp: 98.2 °F (36.8 °C) 98.1 °F (36.7 °C) 97.9 °F (36.6 °C)    TempSrc: Oral Oral Oral    SpO2: 97% 100%  100%   Weight:       Height:         - ;    BMP: Recent Labs     06/02/22  1030 06/02/22  1601 06/03/22  0440 06/03/22  0907   *   < > 125* 128*   K 4.1   < > 3.9 3.6*   CO2 23   < > 23 24   BUN 15  --  15  --    CREATININE 1.11  --  0.95  --    LABGLOM >60  --  >60  --    GLUCOSE 124*  --  112*  --     < > = values in this interval not displayed.           ----        Patient is   ill and high risk for complications ,  Moderate to high complexity of decision making    [] on vent        [x] hemodynamic instability [] shock   []  Septic        [x]  Fluid and electrlyte abn.        [] DKA  [x] delirium / cerebral obtundation / Strokelikesx  [] cardio-respiratory insufficiency  [] cardio-renal     []  Acute failure  -  / renal  / g I bleeding/ has drop hb  []  comorbidity  -  debility / dementia / behavioral  / dm / multi-organ disease   Patient in    [] pccu , [x] icu         Time spent critical care -36 minutes   ---octreotide started   Hx liver disease portal htn            GI INPUT  Patient has had an drop of hemoglobin etiology not clear. As he is known to have significant portal hypertensive gastropathy, it is possible that he last bled secondary to portal hypertension. We will keep him on liquid diet. We will start octreotide. Nephrology input; Assessment:  1. Acute on chronic hypotonic hyponatremia with recent worsening to  119 mmol/l. Urine sodium is less than 20 and urine osmolality 119, likely secondary to decreased effective arterial volume and liver disease + decrease in solute intake due to poor oral intake. SIADH is a differential with the use of Prozac but not supported by low urine sodium and urine osmolarity     2. Chronic liver disease complicated by cirrhosis and ascites[abdominal paracentesis 1 month ago with removal of 11 L]     3.  Acute kidney injury secondary to prerenal component from diuretic-nonoliguric     4. Acute anemia-rule out blood loss                Medications:      Allergies:  No Known Allergies    Current Meds:   Scheduled Meds:    pantoprazole (PROTONIX) 40 mg injection  40 mg IntraVENous Q12H    octreotide  50 mcg IntraVENous Once    atorvastatin  20 mg Oral Nightly    furosemide  20 mg Oral BID    lactulose  10 g Oral TID    nadolol  20 mg Oral Daily    [Held by provider] spironolactone  50 mg Oral QPM    sodium chloride flush  5-40 mL IntraVENous 2 times per day    [Held by provider] enoxaparin  40 mg SubCUTAneous Daily     Continuous Infusions:    sodium chloride      dextrose 50 mL/hr at 06/03/22 1238    octreotide (SandoSTATIN) infusion      sodium chloride       PRN Meds: sodium chloride, midodrine, sodium chloride flush, sodium chloride, ondansetron **OR** ondansetron, polyethylene glycol      MD CHANDNI Spence [address]    Phone [phone]   Fax: [phone]  Answering Service: [phone]

## 2022-06-04 LAB
ABSOLUTE EOS #: 0.04 K/UL (ref 0–0.4)
ABSOLUTE LYMPH #: 0.4 K/UL (ref 1–4.8)
ABSOLUTE MONO #: 0.52 K/UL (ref 0.1–1.3)
ALBUMIN SERPL-MCNC: 2.3 G/DL (ref 3.5–5.2)
ALP BLD-CCNC: 105 U/L (ref 40–129)
ALT SERPL-CCNC: 10 U/L (ref 5–41)
ANION GAP SERPL CALCULATED.3IONS-SCNC: 7 MMOL/L (ref 9–17)
ANION GAP SERPL CALCULATED.3IONS-SCNC: 8 MMOL/L (ref 9–17)
ANION GAP SERPL CALCULATED.3IONS-SCNC: 8 MMOL/L (ref 9–17)
ANION GAP SERPL CALCULATED.3IONS-SCNC: 9 MMOL/L (ref 9–17)
AST SERPL-CCNC: 23 U/L
BASOPHILS # BLD: 0 % (ref 0–2)
BASOPHILS ABSOLUTE: 0 K/UL (ref 0–0.2)
BILIRUB SERPL-MCNC: 3.03 MG/DL (ref 0.3–1.2)
BUN BLDV-MCNC: 10 MG/DL (ref 8–23)
CALCIUM SERPL-MCNC: 8 MG/DL (ref 8.6–10.4)
CHLORIDE BLD-SCNC: 92 MMOL/L (ref 98–107)
CHLORIDE BLD-SCNC: 94 MMOL/L (ref 98–107)
CHLORIDE BLD-SCNC: 94 MMOL/L (ref 98–107)
CHLORIDE BLD-SCNC: 95 MMOL/L (ref 98–107)
CO2: 23 MMOL/L (ref 20–31)
CO2: 24 MMOL/L (ref 20–31)
CO2: 24 MMOL/L (ref 20–31)
CO2: 25 MMOL/L (ref 20–31)
CORTISOL COLLECTION INFO: NORMAL
CORTISOL: 6 UG/DL (ref 2.7–18.4)
CREAT SERPL-MCNC: 0.79 MG/DL (ref 0.7–1.2)
DATE, STOOL #1: ABNORMAL
EOSINOPHILS RELATIVE PERCENT: 1 % (ref 0–4)
GFR AFRICAN AMERICAN: >60 ML/MIN
GFR NON-AFRICAN AMERICAN: >60 ML/MIN
GFR SERPL CREATININE-BSD FRML MDRD: ABNORMAL ML/MIN/{1.73_M2}
GLUCOSE BLD-MCNC: 157 MG/DL (ref 70–99)
HCT VFR BLD CALC: 21.6 % (ref 41–53)
HCT VFR BLD CALC: 22.5 % (ref 41–53)
HCT VFR BLD CALC: 23.8 % (ref 41–53)
HEMOCCULT SP1 STL QL: POSITIVE
HEMOGLOBIN: 6.9 G/DL (ref 13.5–17.5)
HEMOGLOBIN: 7.1 G/DL (ref 13.5–17.5)
HEMOGLOBIN: 7.4 G/DL (ref 13.5–17.5)
LYMPHOCYTES # BLD: 10 % (ref 24–44)
MAGNESIUM: 1.5 MG/DL (ref 1.6–2.6)
MCH RBC QN AUTO: 28 PG (ref 26–34)
MCHC RBC AUTO-ENTMCNC: 31.4 G/DL (ref 31–37)
MCV RBC AUTO: 89.1 FL (ref 80–100)
MONOCYTES # BLD: 13 % (ref 1–7)
MORPHOLOGY: ABNORMAL
PDW BLD-RTO: 16.1 % (ref 11.5–14.9)
PLATELET # BLD: 117 K/UL (ref 150–450)
PMV BLD AUTO: 6.6 FL (ref 6–12)
POTASSIUM SERPL-SCNC: 3.5 MMOL/L (ref 3.7–5.3)
POTASSIUM SERPL-SCNC: 3.6 MMOL/L (ref 3.7–5.3)
POTASSIUM SERPL-SCNC: 4.2 MMOL/L (ref 3.7–5.3)
POTASSIUM SERPL-SCNC: 4.4 MMOL/L (ref 3.7–5.3)
RBC # BLD: 2.53 M/UL (ref 4.5–5.9)
SEG NEUTROPHILS: 76 % (ref 36–66)
SEGMENTED NEUTROPHILS ABSOLUTE COUNT: 3.04 K/UL (ref 1.3–9.1)
SODIUM BLD-SCNC: 125 MMOL/L (ref 135–144)
SODIUM BLD-SCNC: 126 MMOL/L (ref 135–144)
TIME, STOOL #1: ABNORMAL
TOTAL PROTEIN: 4.7 G/DL (ref 6.4–8.3)
WBC # BLD: 4 K/UL (ref 3.5–11)

## 2022-06-04 PROCEDURE — 6370000000 HC RX 637 (ALT 250 FOR IP): Performed by: STUDENT IN AN ORGANIZED HEALTH CARE EDUCATION/TRAINING PROGRAM

## 2022-06-04 PROCEDURE — 2060000000 HC ICU INTERMEDIATE R&B

## 2022-06-04 PROCEDURE — 85025 COMPLETE CBC W/AUTO DIFF WBC: CPT

## 2022-06-04 PROCEDURE — 82533 TOTAL CORTISOL: CPT

## 2022-06-04 PROCEDURE — C9113 INJ PANTOPRAZOLE SODIUM, VIA: HCPCS | Performed by: NURSE PRACTITIONER

## 2022-06-04 PROCEDURE — 94761 N-INVAS EAR/PLS OXIMETRY MLT: CPT

## 2022-06-04 PROCEDURE — 2580000003 HC RX 258: Performed by: STUDENT IN AN ORGANIZED HEALTH CARE EDUCATION/TRAINING PROGRAM

## 2022-06-04 PROCEDURE — 82270 OCCULT BLOOD FECES: CPT

## 2022-06-04 PROCEDURE — 99233 SBSQ HOSP IP/OBS HIGH 50: CPT | Performed by: INTERNAL MEDICINE

## 2022-06-04 PROCEDURE — 83735 ASSAY OF MAGNESIUM: CPT

## 2022-06-04 PROCEDURE — 6360000002 HC RX W HCPCS: Performed by: INTERNAL MEDICINE

## 2022-06-04 PROCEDURE — 6360000002 HC RX W HCPCS: Performed by: NURSE PRACTITIONER

## 2022-06-04 PROCEDURE — 85018 HEMOGLOBIN: CPT

## 2022-06-04 PROCEDURE — 85014 HEMATOCRIT: CPT

## 2022-06-04 PROCEDURE — 36415 COLL VENOUS BLD VENIPUNCTURE: CPT

## 2022-06-04 PROCEDURE — 2580000003 HC RX 258: Performed by: NURSE PRACTITIONER

## 2022-06-04 PROCEDURE — 2580000003 HC RX 258: Performed by: INTERNAL MEDICINE

## 2022-06-04 PROCEDURE — A4216 STERILE WATER/SALINE, 10 ML: HCPCS | Performed by: NURSE PRACTITIONER

## 2022-06-04 PROCEDURE — 80051 ELECTROLYTE PANEL: CPT

## 2022-06-04 PROCEDURE — APPSS30 APP SPLIT SHARED TIME 16-30 MINUTES: Performed by: NURSE PRACTITIONER

## 2022-06-04 PROCEDURE — 99232 SBSQ HOSP IP/OBS MODERATE 35: CPT | Performed by: INTERNAL MEDICINE

## 2022-06-04 PROCEDURE — 80053 COMPREHEN METABOLIC PANEL: CPT

## 2022-06-04 RX ORDER — SODIUM CHLORIDE 9 MG/ML
INJECTION, SOLUTION INTRAVENOUS CONTINUOUS
Status: DISCONTINUED | OUTPATIENT
Start: 2022-06-04 | End: 2022-06-08

## 2022-06-04 RX ADMIN — SODIUM CHLORIDE, PRESERVATIVE FREE 40 MG: 5 INJECTION INTRAVENOUS at 13:22

## 2022-06-04 RX ADMIN — SODIUM CHLORIDE, PRESERVATIVE FREE 40 MG: 5 INJECTION INTRAVENOUS at 01:26

## 2022-06-04 RX ADMIN — OCTREOTIDE ACETATE 50 MCG/HR: 500 INJECTION, SOLUTION INTRAVENOUS; SUBCUTANEOUS at 11:18

## 2022-06-04 RX ADMIN — ATORVASTATIN CALCIUM 20 MG: 20 TABLET, FILM COATED ORAL at 21:05

## 2022-06-04 RX ADMIN — SODIUM CHLORIDE, PRESERVATIVE FREE 10 ML: 5 INJECTION INTRAVENOUS at 21:44

## 2022-06-04 RX ADMIN — SODIUM CHLORIDE: 9 INJECTION, SOLUTION INTRAVENOUS at 09:49

## 2022-06-04 RX ADMIN — FUROSEMIDE 20 MG: 20 TABLET ORAL at 21:05

## 2022-06-04 RX ADMIN — SODIUM CHLORIDE, PRESERVATIVE FREE 10 ML: 5 INJECTION INTRAVENOUS at 08:13

## 2022-06-04 RX ADMIN — OCTREOTIDE ACETATE 50 MCG/HR: 500 INJECTION, SOLUTION INTRAVENOUS; SUBCUTANEOUS at 00:15

## 2022-06-04 RX ADMIN — LACTULOSE 10 G: 20 SOLUTION ORAL at 21:04

## 2022-06-04 RX ADMIN — LACTULOSE 10 G: 20 SOLUTION ORAL at 13:21

## 2022-06-04 ASSESSMENT — ENCOUNTER SYMPTOMS
SHORTNESS OF BREATH: 0
ABDOMINAL PAIN: 0
DIARRHEA: 0
CONSTIPATION: 0
NAUSEA: 0
VOMITING: 0

## 2022-06-04 ASSESSMENT — PAIN SCALES - GENERAL
PAINLEVEL_OUTOF10: 0

## 2022-06-04 NOTE — PLAN OF CARE
Patient's repeat hemoglobin came back at 6.9. If patient starts to develop any symptoms such as headaches, lightheadedness, dizziness transfuse with 1 unit of PRBCs, otherwise will reconsider at next H&H.     [facility] Medicine Resident, PGY 1    6/4/2022  6:05 PM

## 2022-06-04 NOTE — PLAN OF CARE
Problem: Discharge Planning  Goal: Discharge to home or other facility with appropriate resources  6/4/2022 1735 by Roberto Kemp RN  Outcome: Progressing  6/4/2022 0522 by Juana Valdivia RN  Outcome: Progressing     Problem: Safety - Adult  Goal: Free from fall injury  6/4/2022 1735 by Roberto Kemp RN  Outcome: Progressing  6/4/2022 0522 by Juana Valdivia RN  Outcome: Progressing     Problem: ABCDS Injury Assessment  Goal: Absence of physical injury  6/4/2022 1735 by Roberto Kemp RN  Outcome: Progressing  6/4/2022 0522 by Juana Valdivia RN  Outcome: Progressing     Problem: Pain  Goal: Verbalizes/displays adequate comfort level or baseline comfort level  Outcome: Progressing

## 2022-06-04 NOTE — PROGRESS NOTES
[address]    PROGRESS NOTE             6/4/2022    8:34 AM    Name:   Dino Reyes  MRN:     [id number]     Acct:      [de-identified]   Room:   2013/2013-01   Day:  2  Admit Date:  6/2/2022 10:24 AM    PCP:  VASU Mantilla  Code Status:  Full Code    Subjective:     C/C:   Chief Complaint   Patient presents with    Abnormal Lab     Interval History Status: improved. Patient seen and examined at bedside. No acute events reported overnight. Patient received 1 unit of PRBCs yesterday due to drop in hemoglobin. Hemoglobin is morning stable at 7.1. FOBT negative. GI saw patient yesterday and added Protonix 40 mg twice daily, started patient on octreotide. .  Patient's sodium is 125 this morning. Blood pressure this morning one stable, respiratory rate was 34. Patient this morning has no complaints, denies chest pain, shortness of breath, abdominal pain, nausea and vomiting. Patient had a bowel movement yesterday which she states was black in color. Initial FOBT was negative. Brief History:     Patient is a 50-year-old male with history of cirrhosis secondary to hep C, scented to the ED today due to abnormal lab results. Nellie Landau was seen by GI yesterday and had lab work ordered which came back this morning and showed hyponatremia of 119.  Patient is complaining of mild symptoms such as nausea and fatigue but otherwise is asymptomatic.  Patient denies chest pain, shortness of breath, dizziness, lightheadedness, shortness of breath and vomiting.  Patient denies any blood in his stool or any changes in urination. Electrolyte panel done in the ED showed sodium of 119.  CBC unremarkable.  Patient's serum osmolarity is decreased at 259. Pressure in the ED 96/51. Nephrology was consulted in the ED. Review of Systems:     Review of Systems   Constitutional: Negative for chills and fever.    Respiratory: Negative for shortness of breath. Cardiovascular: Negative for chest pain. Gastrointestinal: Negative for abdominal pain, constipation, diarrhea, nausea and vomiting. Neurological: Negative for dizziness and headaches. Medications: Allergies:  No Known Allergies    Current Meds:   Scheduled Meds:    pantoprazole (PROTONIX) 40 mg injection  40 mg IntraVENous Q12H    atorvastatin  20 mg Oral Nightly    furosemide  20 mg Oral BID    lactulose  10 g Oral TID    nadolol  20 mg Oral Daily    [Held by provider] spironolactone  50 mg Oral QPM    sodium chloride flush  5-40 mL IntraVENous 2 times per day    [Held by provider] enoxaparin  40 mg SubCUTAneous Daily     Continuous Infusions:    sodium chloride      dextrose 50 mL/hr at 06/03/22 1238    octreotide (SandoSTATIN) infusion 50 mcg/hr (06/04/22 0015)    sodium chloride       PRN Meds: sodium chloride, midodrine, sodium chloride flush, sodium chloride, ondansetron **OR** ondansetron, polyethylene glycol    Data:     Past Medical History:   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 ([facility] Anxiety, Arthritis, Back pain, chronic, Lezama esophagus, BPH (benign prostatic hypertrophy), Cholelithiasis, Cirrhosis ([facility] COVID-19, COVID-19 vaccine series completed, DDD (degenerative disc disease), lumbar, Depression, Esophageal cancer ([facility] Esophageal varices ([facility] Fatty liver, GERD (gastroesophageal reflux disease), Hep C w/o coma, chronic ([facility] History of alcohol abuse, History of blood transfusion, History of colon polyps, History of tobacco abuse, [place] (hard of hearing), Hyperlipidemia, Hypertension, Port-A-Cath in place, Sciatica, Spinal stenosis, Stomach ulcer, Tubular adenoma of colon, Vitamin D deficiency, and Wears glasses. Social History:   reports that he quit smoking about 5 years ago. He has a 45.00 pack-year smoking history. He has never used smokeless tobacco. He reports previous alcohol use. He reports previous drug use. Frequency: 1.00 time per week. Family History:   Family History   Problem Relation Age of Onset   [facility] Cancer Mother         pancreatic    Cancer Father         bone    Diabetes Sister     Asthma Brother        Vitals:  /65   Pulse 80   Temp 98.4 °F (36.9 °C) (Oral)   Resp (!) 34   Ht 5' 10\" (1.778 m)   Wt 200 lb (90.7 kg)   SpO2 92%   BMI 28.70 kg/m²   Temp (24hrs), Av.2 °F (36.8 °C), Min:97.9 °F (36.6 °C), Max:98.4 °F (36.9 °C)    No results for input(s): POCGLU in the last 72 hours. I/O(24Hr): Intake/Output Summary (Last 24 hours) at 2022 0834  Last data filed at 2022 0400  Gross per 24 hour   Intake 760 ml   Output 2225 ml   Net -1465 ml       Labs:  [unfilled]    Lab Results   Component Value Date/Time    SPECIAL NOT REPORTED 2022 11:28 AM     Lab Results   Component Value Date/Time    CULTURE NO GROWTH 6 DAYS 2022 09:00 AM       [unfilled]    Radiology:    No results found. Physical Examination:        Physical Exam  Constitutional:       Appearance: Normal appearance. HENT:      Head: Normocephalic and atraumatic. Cardiovascular:      Rate and Rhythm: Normal rate and regular rhythm. Pulses: Normal pulses. Heart sounds: Normal heart sounds. No murmur heard. No friction rub. No gallop. Pulmonary:      Effort: Pulmonary effort is normal. No respiratory distress. Breath sounds: Normal breath sounds. No stridor. No wheezing, rhonchi or rales. Abdominal:      General: Abdomen is flat. There is distension. Palpations: Abdomen is soft. There is no mass. Tenderness: There is no abdominal tenderness. There is no guarding or rebound. Hernia: No hernia is present. Neurological:      Mental Status: He is alert.            Assessment:        Primary Problem  Hyponatremia    Active Hospital Problems    Diagnosis Date Noted    Drop in hemoglobin [R71.0] 2022     Priority: Medium    Portal hypertension ([facility] [K76.6] Priority: Medium    Cirrhosis ([facility] [K74.60]     Hyponatremia [E87.1] 07/20/2016       Plan:        1. Hyponatremia  -Sodium level in the , this morning 125  -Nephrology consulted   -Started D5 at 50 cc/h to avoid fast correction    -Monitor strict urine output   -Fluid restriction of 1500 mils per day   -Resume Aldactone and Lasix once stable    2. Drop in hemoglobin secondary to cirrhosis versus GI bleed  -Hemoglobin this morning 7.1, status post 1 unit of PRBCs transfusion  -FOBT negative  -We will get repeat FOBT due to patient having black stools  -Lovenox held  -GI consulted   -Added octreotide for possible bleed secondary to portal hypertension   -Added Protonix 40 mg IV twice daily   -Plan for EGD this morning   -Continue nadolol and lactulose     3.  Hypotension  -Midodrine 5 mg 3 times as needed              -Hold if systolic blood pressure less than 110    Henrique Alfaro MD  6/4/2022  8:34 AM     Attestation and add on       I have discussed the care of Juan Carlos Ornelas , including pertinent history and exam findings,      6/4/22    with the resident. I have seen and examined the patient and the key elements of all parts of the encounter have been performed by me . I agree with the assessment, plan and orders as documented by the resident. Hospital Problems           Last Modified POA    * (Principal) Hyponatremia 6/2/2022 Yes    Drop in hemoglobin 6/3/2022 Yes    Portal hypertension ([facility] 6/3/2022 Yes    Cirrhosis ([facility] 6/2/2022 Yes             ''''''''''       MD CHANDNI Serrano [address], [address] [place] [address].    Phone [phone]   Fax: [phone]  Answering Service: [phone]

## 2022-06-04 NOTE — PROGRESS NOTES
[facility]    Gastroenterology Daily Progress Note      Patient:   Felice Veliz   :    67   [facility]  Date:     2022  Consultant:   MASSIMO Jay CNP, CNP      SUBJECTIVE  57 y.o. male admitted 2022 with Hyponatremia [E87.1] and seen for anemia with cirrhosis. The pt was seen and examined. hgb 7.1 states no BM overnight. Remains alert with no c/o abdominal pain.  .        OBJECTIVE  Scheduled Meds:   pantoprazole (PROTONIX) 40 mg injection  40 mg IntraVENous Q12H    atorvastatin  20 mg Oral Nightly    furosemide  20 mg Oral BID    lactulose  10 g Oral TID    nadolol  20 mg Oral Daily    [Held by provider] spironolactone  50 mg Oral QPM    sodium chloride flush  5-40 mL IntraVENous 2 times per day    [Held by provider] enoxaparin  40 mg SubCUTAneous Daily       Vital Signs:  /65   Pulse 73   Temp 98.4 °F (36.9 °C) (Oral)   Resp 18   Ht 5' 10\" (1.778 m)   Wt 200 lb (90.7 kg)   SpO2 98%   BMI 28.70 kg/m²      Physical Exam:     General Appearance: alert and oriented to person, place and time, well-developed and well-nourished, in no acute distress  Skin: warm and dry, no rash or erythema  Head: normocephalic and atraumatic  Eyes: pupils equal, round, and reactive to light, extraocular eye movements intact, conjunctivae normal  ENT: hearing grossly normal bilaterally  Neck: neck supple and non tender without mass, no thyromegaly or thyroid nodules, no cervical lymphadenopathy   Pulmonary/Chest: clear to auscultation bilaterally- no wheezes, rales or rhonchi, normal air movement, no respiratory distress  Cardiovascular: normal rate, regular rhythm, normal S1 and S2, no murmurs, rubs, clicks or gallops, distal pulses intact, no carotid bruits  Abdomen: soft, non-tender, non-distended, normal bowel sounds, no masses or organomegaly  Extremities: no cyanosis, clubbing or edema  Musculoskeletal: normal range of motion, no joint swelling, deformity or tenderness  Neurologic: no cranial nerve deficit and muscle strength normal    Lab and Imaging Review     CBC  Recent Labs     06/03/22  0620 06/03/22  0620 06/03/22  1322 06/03/22  2131 06/04/22 0452   WBC 4.5  --  5.5  --  4.0   HGB 5.9*   < > 7.2* 7.0* 7.1*   HCT 19.1*   < > 22.8* 22.4* 22.5*   MCV 90.9  --  88.4  --  89.1   *  --  148*  --  117*    < > = values in this interval not displayed. BMP  Recent Labs     06/02/22  1030 06/02/22  1601 06/03/22  0440 06/03/22  0907 06/03/22  1639 06/03/22 2042 06/04/22 0452   *   < > 125*   < > 126* 123* 125*   K 4.1   < > 3.9   < > 4.3 5.1 3.5*   CL 83*   < > 93*   < > 93* 92* 92*   CO2 23   < > 23   < > 25 16* 24   BUN 15  --  15  --   --   --  10   CREATININE 1.11  --  0.95  --   --   --  0.79   GLUCOSE 124*  --  112*  --   --   --  157*   CALCIUM 8.9  --  7.9*  --   --   --  8.0*    < > = values in this interval not displayed. LFTS  Recent Labs     06/02/22  1030 06/03/22  0440 06/04/22 0452   ALKPHOS 132* 101 105   ALT 13 10 10   AST 27 21 23   PROT 5.9* 4.4* 4.7*   BILITOT 1.86* 1.37* 3.03*   LABALBU 3.1* 2.3* 2.3*   Results for Rik Ace (MRN [id number]) as of 6/4/2022 11:52   Ref. Range 4/13/2022 15:37   AFP (Alpha Fetoprotein) Latest Ref Range: <8.4 ug/L 2.5           ASSESSMENT/plan  1. Anemia; no overt gi bleeding overnight  -trend hh  -continue ppi and sandostatin  -possible egd monday    2. Cirrhosis secondary to alcohol abuse, treated hepatitis c  -continue the lactulose  -2gm clear diet   -avoid sedatives and narcotics      3. Hx esophageal cancer        This plan was formulated in collaboration with Dr. Luke Cole.     Electronically signed by: MASSIMO Pedroza CNP on 6/4/2022 at 7:50 AM

## 2022-06-04 NOTE — PROGRESS NOTES
Nephrology Progress Note    Subjective/   57y.o. year old male who we are seeing in consultation for hyponatremia. Interval history:  Patient denies any headache,  nausea -pt is alert oriented x3 serum sodium is 126 mmol/l this morning. He was started on D5 water yesterday to avoid rapid correction. Hemoglobin is 7.1gm/dl. History of Present Illness: This is a 57 y.o. male with history of alcoholic liver disease, cirrhosis complicated by portal hypertension and ascites, history of esophageal cancer 1 year ago status post chemoradiation therapy. He presented to the ER of [facility] with fatigue and nausea over the last 1 week. He indicated poor appetite, dull  abdominal ache, muscle weakness and unsteady gait. He denied any headaches or seizures. He was found on laboratory studies to have a serum sodium of 119 mmol/L[baseline serum sodium  128 to 132 mmol/L]. He has been keeping to a fluid restriction and is on Aldactone 100 mg 3 times daily and Lasix 40 mg 3 times daily. Denies any use of thiazide diuretics. Home medication includes Prozac-SSRI. He denies any decrease in urine output. This morning he was noted to hypotensive with hemoglobin of  5.9 gm/dl. He currently is on 0.9 saline at 75 cc/h and serum sodium has been decreased to 128 mmol/L.   Denies any rectal bleeding or melena.     Objective/     Vitals:    06/04/22 1000 06/04/22 1100 06/04/22 1200 06/04/22 1300   BP: 106/63 123/67 116/61 (!) 95/52   Pulse: 80 85 78 78   Resp: 15 19 21 16   Temp:   98.8 °F (37.1 °C)    TempSrc:       SpO2:  98% 99% 94%   Weight:       Height:         24HR INTAKE/OUTPUT:      Intake/Output Summary (Last 24 hours) at 6/4/2022 1352  Last data filed at 6/4/2022 1300  Gross per 24 hour   Intake 480 ml   Output 1825 ml   Net -1345 ml     Patient Vitals for the past 96 hrs (Last 3 readings):   Weight   06/02/22 1020 200 lb (90.7 kg)       Constitutional:  Alert, awake, no apparent distress  Cardiovascular:  S1, S2 without m/r/g  Respiratory:  CTA B without w/r/r  Abdomen: +bs, soft, nt  Ext: nil LE edema. Data/  Recent Labs     06/03/22  0620 06/03/22  0620 06/03/22  1322 06/03/22  2131 06/04/22  0452   WBC 4.5  --  5.5  --  4.0   HGB 5.9*   < > 7.2* 7.0* 7.1*   HCT 19.1*   < > 22.8* 22.4* 22.5*   MCV 90.9  --  88.4  --  89.1   *  --  148*  --  117*    < > = values in this interval not displayed. Recent Labs     06/02/22  1030 06/02/22  1601 06/03/22  0440 06/03/22  0907 06/03/22  2042 06/04/22  0452 06/04/22  1040   *   < > 125*   < > 123* 125* 126*   K 4.1   < > 3.9   < > 5.1 3.5* 3.6*   CL 83*   < > 93*   < > 92* 92* 94*   CO2 23   < > 23   < > 16* 24 24   GLUCOSE 124*  --  112*  --   --  157*  --    MG  --   --   --   --   --  1.5*  --    BUN 15  --  15  --   --  10  --    CREATININE 1.11  --  0.95  --   --  0.79  --    LABGLOM >60  --  >60  --   --  >60  --    GFRAA >60  --  >60  --   --  >60  --     < > = values in this interval not displayed. Assessment/   1. Acute on chronic hypotonic hyponatremia with recent worsening to  119 mmol/l. Urine sodium is less than 20 and urine osmolality 119, likely secondary to decreased effective arterial volume and liver disease + decrease in solute intake  SIADH is a differential with the use of Prozac but not supported by low urine sodium and urine osmolarity. Serum sodium gradually improving and at goal today of 126 mmol/L     2. Chronic liver disease complicated by cirrhosis and ascites[abdominal paracentesis 1 month ago with removal of 11 L]     3.  Acute kidney injury secondary to prerenal component from diuretic-nonoliguric     4. Acute anemia-rule out blood loss-Hb 7.1 gm/dl      Plan/   IV fluids with 0.9 saline at 50 mils per hour.   Check serum sodium 6 hrly   Monitor strict urine output  Oral Fluid restriction of 1500 mls/day  Ideal correction should be 4-6 meq/l in 24 hours and serum sodium correction should not exceed 10 meq in 24 hrs. Resume Aldactone tomorrow as patient is hemodynamically stable-BP at baseline.       Jaleel Ramachandran MD

## 2022-06-05 LAB
ABSOLUTE BANDS #: 0.04 K/UL (ref 0–1)
ABSOLUTE EOS #: 0.04 K/UL (ref 0–0.4)
ABSOLUTE LYMPH #: 0.44 K/UL (ref 1–4.8)
ABSOLUTE MONO #: 0.4 K/UL (ref 0.1–1.3)
ALBUMIN SERPL-MCNC: 2.4 G/DL (ref 3.5–5.2)
ALP BLD-CCNC: 100 U/L (ref 40–129)
ALT SERPL-CCNC: 10 U/L (ref 5–41)
ANION GAP SERPL CALCULATED.3IONS-SCNC: 10 MMOL/L (ref 9–17)
ANION GAP SERPL CALCULATED.3IONS-SCNC: 9 MMOL/L (ref 9–17)
ANION GAP SERPL CALCULATED.3IONS-SCNC: 9 MMOL/L (ref 9–17)
AST SERPL-CCNC: 21 U/L
BANDS: 1 % (ref 0–10)
BASOPHILS # BLD: 0 % (ref 0–2)
BASOPHILS ABSOLUTE: 0 K/UL (ref 0–0.2)
BILIRUB SERPL-MCNC: 2.04 MG/DL (ref 0.3–1.2)
BUN BLDV-MCNC: 8 MG/DL (ref 8–23)
CALCIUM SERPL-MCNC: 8 MG/DL (ref 8.6–10.4)
CHLORIDE BLD-SCNC: 96 MMOL/L (ref 98–107)
CHLORIDE BLD-SCNC: 97 MMOL/L (ref 98–107)
CHLORIDE BLD-SCNC: 97 MMOL/L (ref 98–107)
CO2: 23 MMOL/L (ref 20–31)
CO2: 23 MMOL/L (ref 20–31)
CO2: 24 MMOL/L (ref 20–31)
CREAT SERPL-MCNC: 0.72 MG/DL (ref 0.7–1.2)
EKG ATRIAL RATE: 72 BPM
EKG P AXIS: 26 DEGREES
EKG P-R INTERVAL: 148 MS
EKG Q-T INTERVAL: 398 MS
EKG QRS DURATION: 76 MS
EKG QTC CALCULATION (BAZETT): 435 MS
EKG R AXIS: 5 DEGREES
EKG T AXIS: -3 DEGREES
EKG VENTRICULAR RATE: 72 BPM
EOSINOPHILS RELATIVE PERCENT: 1 % (ref 0–4)
GFR AFRICAN AMERICAN: >60 ML/MIN
GFR NON-AFRICAN AMERICAN: >60 ML/MIN
GFR SERPL CREATININE-BSD FRML MDRD: ABNORMAL ML/MIN/{1.73_M2}
GLUCOSE BLD-MCNC: 105 MG/DL (ref 70–99)
HCT VFR BLD CALC: 22.4 % (ref 41–53)
HCT VFR BLD CALC: 22.5 % (ref 41–53)
HCT VFR BLD CALC: 23.3 % (ref 41–53)
HEMOGLOBIN: 6.9 G/DL (ref 13.5–17.5)
HEMOGLOBIN: 7 G/DL (ref 13.5–17.5)
HEMOGLOBIN: 7.2 G/DL (ref 13.5–17.5)
LYMPHOCYTES # BLD: 10 % (ref 24–44)
MCH RBC QN AUTO: 28 PG (ref 26–34)
MCHC RBC AUTO-ENTMCNC: 30.6 G/DL (ref 31–37)
MCV RBC AUTO: 91.3 FL (ref 80–100)
MONOCYTES # BLD: 9 % (ref 1–7)
MORPHOLOGY: ABNORMAL
PDW BLD-RTO: 16.1 % (ref 11.5–14.9)
PLATELET # BLD: 118 K/UL (ref 150–450)
PMV BLD AUTO: 6.9 FL (ref 6–12)
POTASSIUM SERPL-SCNC: 3.9 MMOL/L (ref 3.7–5.3)
POTASSIUM SERPL-SCNC: 4.2 MMOL/L (ref 3.7–5.3)
POTASSIUM SERPL-SCNC: 4.4 MMOL/L (ref 3.7–5.3)
RBC # BLD: 2.46 M/UL (ref 4.5–5.9)
SEG NEUTROPHILS: 79 % (ref 36–66)
SEGMENTED NEUTROPHILS ABSOLUTE COUNT: 3.48 K/UL (ref 1.3–9.1)
SODIUM BLD-SCNC: 128 MMOL/L (ref 135–144)
SODIUM BLD-SCNC: 130 MMOL/L (ref 135–144)
SODIUM BLD-SCNC: 130 MMOL/L (ref 135–144)
TOTAL PROTEIN: 4.6 G/DL (ref 6.4–8.3)
WBC # BLD: 4.4 K/UL (ref 3.5–11)

## 2022-06-05 PROCEDURE — 6360000002 HC RX W HCPCS: Performed by: INTERNAL MEDICINE

## 2022-06-05 PROCEDURE — A4216 STERILE WATER/SALINE, 10 ML: HCPCS | Performed by: NURSE PRACTITIONER

## 2022-06-05 PROCEDURE — 93010 ELECTROCARDIOGRAM REPORT: CPT | Performed by: INTERNAL MEDICINE

## 2022-06-05 PROCEDURE — APPSS30 APP SPLIT SHARED TIME 16-30 MINUTES: Performed by: NURSE PRACTITIONER

## 2022-06-05 PROCEDURE — 99233 SBSQ HOSP IP/OBS HIGH 50: CPT | Performed by: INTERNAL MEDICINE

## 2022-06-05 PROCEDURE — 2580000003 HC RX 258: Performed by: INTERNAL MEDICINE

## 2022-06-05 PROCEDURE — 2580000003 HC RX 258: Performed by: NURSE PRACTITIONER

## 2022-06-05 PROCEDURE — 85025 COMPLETE CBC W/AUTO DIFF WBC: CPT

## 2022-06-05 PROCEDURE — 2580000003 HC RX 258: Performed by: STUDENT IN AN ORGANIZED HEALTH CARE EDUCATION/TRAINING PROGRAM

## 2022-06-05 PROCEDURE — 80051 ELECTROLYTE PANEL: CPT

## 2022-06-05 PROCEDURE — 6370000000 HC RX 637 (ALT 250 FOR IP): Performed by: STUDENT IN AN ORGANIZED HEALTH CARE EDUCATION/TRAINING PROGRAM

## 2022-06-05 PROCEDURE — 6360000002 HC RX W HCPCS: Performed by: NURSE PRACTITIONER

## 2022-06-05 PROCEDURE — 6360000002 HC RX W HCPCS: Performed by: STUDENT IN AN ORGANIZED HEALTH CARE EDUCATION/TRAINING PROGRAM

## 2022-06-05 PROCEDURE — C9113 INJ PANTOPRAZOLE SODIUM, VIA: HCPCS | Performed by: NURSE PRACTITIONER

## 2022-06-05 PROCEDURE — 85014 HEMATOCRIT: CPT

## 2022-06-05 PROCEDURE — 80053 COMPREHEN METABOLIC PANEL: CPT

## 2022-06-05 PROCEDURE — 99232 SBSQ HOSP IP/OBS MODERATE 35: CPT | Performed by: INTERNAL MEDICINE

## 2022-06-05 PROCEDURE — 85018 HEMOGLOBIN: CPT

## 2022-06-05 PROCEDURE — 36415 COLL VENOUS BLD VENIPUNCTURE: CPT

## 2022-06-05 PROCEDURE — 2060000000 HC ICU INTERMEDIATE R&B

## 2022-06-05 RX ORDER — PANTOPRAZOLE SODIUM 40 MG/1
40 TABLET, DELAYED RELEASE ORAL
Status: DISCONTINUED | OUTPATIENT
Start: 2022-06-05 | End: 2022-06-06

## 2022-06-05 RX ADMIN — SODIUM CHLORIDE: 9 INJECTION, SOLUTION INTRAVENOUS at 06:08

## 2022-06-05 RX ADMIN — NADOLOL 20 MG: 20 TABLET ORAL at 08:46

## 2022-06-05 RX ADMIN — MIDODRINE HYDROCHLORIDE 5 MG: 5 TABLET ORAL at 13:58

## 2022-06-05 RX ADMIN — OCTREOTIDE ACETATE 50 MCG/HR: 500 INJECTION, SOLUTION INTRAVENOUS; SUBCUTANEOUS at 22:17

## 2022-06-05 RX ADMIN — OCTREOTIDE ACETATE 50 MCG/HR: 500 INJECTION, SOLUTION INTRAVENOUS; SUBCUTANEOUS at 11:31

## 2022-06-05 RX ADMIN — LACTULOSE 10 G: 20 SOLUTION ORAL at 08:46

## 2022-06-05 RX ADMIN — IRON SUCROSE 200 MG: 20 INJECTION, SOLUTION INTRAVENOUS at 14:11

## 2022-06-05 RX ADMIN — SODIUM CHLORIDE, PRESERVATIVE FREE 10 ML: 5 INJECTION INTRAVENOUS at 09:31

## 2022-06-05 RX ADMIN — ATORVASTATIN CALCIUM 20 MG: 20 TABLET, FILM COATED ORAL at 20:52

## 2022-06-05 RX ADMIN — SODIUM CHLORIDE, PRESERVATIVE FREE 10 ML: 5 INJECTION INTRAVENOUS at 20:52

## 2022-06-05 RX ADMIN — FUROSEMIDE 20 MG: 20 TABLET ORAL at 08:46

## 2022-06-05 RX ADMIN — SODIUM CHLORIDE, PRESERVATIVE FREE 40 MG: 5 INJECTION INTRAVENOUS at 13:53

## 2022-06-05 RX ADMIN — LACTULOSE 10 G: 20 SOLUTION ORAL at 13:53

## 2022-06-05 RX ADMIN — SODIUM CHLORIDE, PRESERVATIVE FREE 40 MG: 5 INJECTION INTRAVENOUS at 00:26

## 2022-06-05 RX ADMIN — OCTREOTIDE ACETATE 50 MCG/HR: 500 INJECTION, SOLUTION INTRAVENOUS; SUBCUTANEOUS at 02:12

## 2022-06-05 ASSESSMENT — PAIN SCALES - GENERAL
PAINLEVEL_OUTOF10: 0

## 2022-06-05 ASSESSMENT — ENCOUNTER SYMPTOMS
VOMITING: 0
DIARRHEA: 0
CONSTIPATION: 0
SHORTNESS OF BREATH: 0
ABDOMINAL PAIN: 0
NAUSEA: 0

## 2022-06-05 NOTE — PROGRESS NOTES
Nephrology Progress Note    Subjective/   57y.o. year old male who we are seeing in consultation for hyponatremia. Interval history:  Patient's serum sodium is correcting gradually currently 130 mmol/l.. He did have dizziness with episode of hypotension this morning. No obvious GI bleed however hemoglobin remains at 7.0 gm/dl. Plan for EGD tomorrow. History of Present Illness: This is a 57 y.o. male with history of alcoholic liver disease, cirrhosis complicated by portal hypertension and ascites, history of esophageal cancer 1 year ago status post chemoradiation therapy. He presented to the ER of [facility] with fatigue and nausea over the last 1 week. He indicated poor appetite, dull  abdominal ache, muscle weakness and unsteady gait. He denied any headaches or seizures. He was found on laboratory studies to have a serum sodium of 119 mmol/L[baseline serum sodium  128 to 132 mmol/L]. He has been keeping to a fluid restriction and is on Aldactone 100 mg 3 times daily and Lasix 40 mg 3 times daily. Denies any use of thiazide diuretics. Home medication includes Prozac-SSRI. He denies any decrease in urine output. This morning he was noted to hypotensive with hemoglobin of  5.9 gm/dl. He currently is on 0.9 saline at 75 cc/h and serum sodium has been decreased to 128 mmol/L.   Denies any rectal bleeding or melena.     Objective/     Vitals:    06/05/22 1100 06/05/22 1136 06/05/22 1200 06/05/22 1312   BP: (!) 81/44 105/68 (!) 82/48 (!) 100/32   Pulse: 72 64 62 64   Resp: 12 19 17 16   Temp:   98.8 °F (37.1 °C)    TempSrc:   Oral    SpO2: 98%  100%    Weight:       Height:         24HR INTAKE/OUTPUT:      Intake/Output Summary (Last 24 hours) at 6/5/2022 1432  Last data filed at 6/5/2022 1100  Gross per 24 hour   Intake 360 ml   Output 1375 ml   Net -1015 ml     Patient Vitals for the past 96 hrs (Last 3 readings):   Weight   06/02/22 1020 200 lb (90.7 kg)       Constitutional:  Alert, awake, no apparent distress  Cardiovascular:  S1, S2 without m/r/g  Respiratory:  CTA B without w/r/r  Abdomen: +bs, soft, nt  Ext: nil LE edema. Data/  Recent Labs     06/03/22  1322 06/03/22  2131 06/04/22  0452 06/04/22  1458 06/04/22 2152 06/05/22  0344 06/05/22  1133   WBC 5.5  --  4.0  --   --  4.4  --    HGB 7.2*   < > 7.1*   < > 7.4* 6.9* 7.0*   HCT 22.8*   < > 22.5*   < > 23.8* 22.5* 22.4*   MCV 88.4  --  89.1  --   --  91.3  --    *  --  117*  --   --  118*  --     < > = values in this interval not displayed. Recent Labs     06/03/22  0440 06/03/22  0907 06/04/22  0452 06/04/22  1040 06/04/22 2152 06/05/22  0344 06/05/22  0918   *   < > 125*   < > 126* 128* 130*   K 3.9   < > 3.5*   < > 4.4 3.9 4.2   CL 93*   < > 92*   < > 95* 96* 97*   CO2 23   < > 24   < > 23 23 23   GLUCOSE 112*  --  157*  --   --  105*  --    MG  --   --  1.5*  --   --   --   --    BUN 15  --  10  --   --  8  --    CREATININE 0.95  --  0.79  --   --  0.72  --    LABGLOM >60  --  >60  --   --  >60  --    GFRAA >60  --  >60  --   --  >60  --     < > = values in this interval not displayed. Assessment/   1. Acute on chronic hypotonic hyponatremia with recent worsening to  119 mmol/l. Urine sodium is less than 20 and urine osmolality 119, likely secondary to decreased effective arterial volume and liver disease + decrease in solute intake  SIADH is a differential with the use of Prozac but not supported by low urine sodium and urine osmolarity. Serum sodium gradually improving and at goal today of 130 mmol/L     2. Chronic liver disease complicated by cirrhosis and ascites[abdominal paracentesis 1 month ago with removal of 11 L]     3.  Acute kidney injury secondary to prerenal component from diuretic-resolved     4. Acute anemia-rule out blood loss-Hb 7.0 gm/dl    5. Hypotension  Plan/   IV fluids with 0.9 saline at 50 mils per hour.   Serum sodium can be checked every 12 hrly  Monitor strict urine output  Midodrine 5 mg 3 times daily for systolic less than 90  Oral Fluid restriction of 1500 mls/day  Ideal correction should be 4-6 meq/l in 24 hours and serum sodium correction should not exceed 10 meq in 24 hrs.   Hold Aldactone and Lasix today due to hypotension-monitor H&H      Stephany Gordon MD

## 2022-06-05 NOTE — PLAN OF CARE
Problem: Chronic Conditions and Co-morbidities  Goal: Patient's chronic conditions and co-morbidity symptoms are monitored and maintained or improved  Outcome: Progressing  Flowsheets (Taken 6/5/2022 0327)  Care Plan - Patient's Chronic Conditions and Co-Morbidity Symptoms are Monitored and Maintained or Improved: Monitor and assess patient's chronic conditions and comorbid symptoms for stability, deterioration, or improvement

## 2022-06-05 NOTE — PROGRESS NOTES
[facility]    Gastroenterology Daily Progress Note      Patient:   Zahra Akers   :       Facility:   [facility]  Date:     2022  Consultant:   MASSIMO Baptiste CNP, CNP      SUBJECTIVE  57 y.o. male admitted 2022 with Hyponatremia [E87.1] and seen for anemia with cirrhosis . The pt was seen and examined. hgb 6.9, reports one episode of melena last night. No c/o abdominal pain dizziness or shortness of breath.          OBJECTIVE  Scheduled Meds:   pantoprazole (PROTONIX) 40 mg injection  40 mg IntraVENous Q12H    atorvastatin  20 mg Oral Nightly    furosemide  20 mg Oral BID    lactulose  10 g Oral TID    nadolol  20 mg Oral Daily    [Held by provider] spironolactone  50 mg Oral QPM    sodium chloride flush  5-40 mL IntraVENous 2 times per day    [Held by provider] enoxaparin  40 mg SubCUTAneous Daily       Vital Signs:  /63   Pulse 79   Temp 98 °F (36.7 °C) (Oral)   Resp 18   Ht 5' 10\" (1.778 m)   Wt 200 lb (90.7 kg)   SpO2 96%   BMI 28.70 kg/m²      Physical Exam:   General Appearance: alert and oriented to person, place and time, well-developed and well-nourished, in no acute distress  Skin: warm and dry, no rash or erythema  Head: normocephalic and atraumatic  Eyes: pupils equal, round, and reactive to light, extraocular eye movements intact, conjunctivae normal  ENT: hearing grossly normal bilaterally  Neck: neck supple and non tender without mass, no thyromegaly or thyroid nodules, no cervical lymphadenopathy   Pulmonary/Chest: clear to auscultation bilaterally- no wheezes, rales or rhonchi, normal air movement, no respiratory distress  Cardiovascular: normal rate, regular rhythm, normal S1 and S2, no murmurs, rubs, clicks or gallops, distal pulses intact, no carotid bruits  Abdomen: soft, non-tender, non-distended, normal bowel sounds, no masses or organomegaly  Extremities: no cyanosis, clubbing or edema  Musculoskeletal: normal range of motion, no joint swelling, deformity or tenderness  Neurologic: no cranial nerve deficit and muscle strength normal    Lab and Imaging Review     CBC  Recent Labs     06/03/22  1322 06/03/22  2131 06/04/22  0452 06/04/22  0452 06/04/22  1458 06/04/22 2152 06/05/22  0344   WBC 5.5  --  4.0  --   --   --  4.4   HGB 7.2*   < > 7.1*   < > 6.9* 7.4* 6.9*   HCT 22.8*   < > 22.5*   < > 21.6* 23.8* 22.5*   MCV 88.4  --  89.1  --   --   --  91.3   *  --  117*  --   --   --  118*    < > = values in this interval not displayed. BMP  Recent Labs     06/03/22  0440 06/03/22  0907 06/04/22  0452 06/04/22  1040 06/04/22 1458 06/04/22 2152 06/05/22  0344   *   < > 125*   < > 126* 126* 128*   K 3.9   < > 3.5*   < > 4.2 4.4 3.9   CL 93*   < > 92*   < > 94* 95* 96*   CO2 23   < > 24   < > 25 23 23   BUN 15  --  10  --   --   --  8   CREATININE 0.95  --  0.79  --   --   --  0.72   GLUCOSE 112*  --  157*  --   --   --  105*   CALCIUM 7.9*  --  8.0*  --   --   --  8.0*    < > = values in this interval not displayed. LFTS  Recent Labs     06/03/22  0440 06/04/22 0452 06/05/22  0344   ALKPHOS 101 105 100   ALT 10 10 10   AST 21 23 21   PROT 4.4* 4.7* 4.6*   BILITOT 1.37* 3.03* 2.04*   LABALBU 2.3* 2.3* 2.4*           ASSESSMENT/plan  1. Anemia with melena x 1  trend hh  -continue ppi and sandostatin  egd tomorrow    2. Cirrhosis secondary to alcohol abuse, treated hepatitis c  -continue the lactulose  -2gm clear diet   -avoid sedatives and narcotics        3. Hx esophageal cancer        This plan was formulated in collaboration with Dr. Khalida Borjas  .     Electronically signed by: MASSIMO Fritz CNP on 6/5/2022 at 7:48 AM

## 2022-06-05 NOTE — PROGRESS NOTES
Patient is vitally stable, Hb is around 7 per last check, confirmed with RN that patient can be transferred out of ICU because Na: 130 and progressive can run continuous drip of octreotide.          Koko Rivera  PGY-2  Family Medicine     6/5/2022  12:12 PM

## 2022-06-05 NOTE — PROGRESS NOTES
[address]    PROGRESS NOTE             6/5/2022    7:45 AM    Name:   Patrica Tom  MRN:     [id number]     Acct:      [de-identified]   Room:   2013/2013-01   Day:  3  Admit Date:  6/2/2022 10:24 AM    PCP:  VASU Rivera  Code Status:  Full Code    Subjective:     C/C:   Chief Complaint   Patient presents with    Abnormal Lab     Interval History Status: not changed. Patient seen examined at bedside. No acute events reported overnight. Patient's vitals within normal limits. Patient sodium trending up, this morning it was at 128. Patient is on sodium chloride IV running at 50 mill per hour. Patient's hemoglobin 6.9 this morning. Repeat FOBT positive. Patient is on nadolol, acute right, and Protonix IV 40 mg twice daily. Per GI note, possible EGD on Monday. Patient has no complaints this morning. Patient denies chest pain, shortness of breath, dizziness or lightheadedness. Brief History:   Patient is a 57-year-old male with history of cirrhosis secondary to hep C, scented to the ED today due to abnormal lab results. Erica Jules was seen by GI yesterday and had lab work ordered which came back this morning and showed hyponatremia of 119.  Patient is complaining of mild symptoms such as nausea and fatigue but otherwise is asymptomatic.  Patient denies chest pain, shortness of breath, dizziness, lightheadedness, shortness of breath and vomiting.  Patient denies any blood in his stool or any changes in urination. Electrolyte panel done in the ED showed sodium of 119.  CBC unremarkable.  Patient's serum osmolarity is decreased at 259. Pressure in the ED 96/51.   Nephrology was consulted in the ED.     Patient is a 57-year-old male with history of cirrhosis secondary to hep C, scented to the ED today due to abnormal lab results. Erica Jules was seen by GI yesterday and had lab work ordered which came back this morning and showed hyponatremia of 119.  Patient is complaining of mild symptoms such as nausea and fatigue but otherwise is asymptomatic.  Patient denies chest pain, shortness of breath, dizziness, lightheadedness, shortness of breath and vomiting.  Patient denies any blood in his stool or any changes in urination. Electrolyte panel done in the ED showed sodium of 119.  CBC unremarkable.  Patient's serum osmolarity is decreased at 259. Pressure in the ED 96/51. Nephrology was consulted in the ED. Review of Systems:     Review of Systems   Constitutional: Negative for chills and fever. Respiratory: Negative for shortness of breath. Cardiovascular: Negative for chest pain. Gastrointestinal: Negative for abdominal pain, constipation, diarrhea, nausea and vomiting. Genitourinary: Negative for dysuria. Neurological: Negative for dizziness, light-headedness, numbness and headaches. Medications:      Allergies:  No Known Allergies    Current Meds:   Scheduled Meds:    pantoprazole (PROTONIX) 40 mg injection  40 mg IntraVENous Q12H    atorvastatin  20 mg Oral Nightly    furosemide  20 mg Oral BID    lactulose  10 g Oral TID    nadolol  20 mg Oral Daily    [Held by provider] spironolactone  50 mg Oral QPM    sodium chloride flush  5-40 mL IntraVENous 2 times per day    [Held by provider] enoxaparin  40 mg SubCUTAneous Daily     Continuous Infusions:    sodium chloride 50 mL/hr at 06/05/22 0608    sodium chloride      octreotide (SandoSTATIN) infusion 50 mcg/hr (06/05/22 0212)    sodium chloride       PRN Meds: sodium chloride, midodrine, sodium chloride flush, sodium chloride, ondansetron **OR** ondansetron, polyethylene glycol    Data:     Past Medical History:   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 Anxiety, Arthritis, Back pain, chronic, Lez[place] esophagus, BPH (benign prostatic hypertrophy), Cholelithiasis, Cirrhosis ([facility] [address] [facility] COVID-19, COVID-19 vaccine series completed, DDD (degenerative disc disease), lumbar, Depression, Esophageal cancer ([facility] [address] [facility] Esophageal varices ([facility] [address] Fatty liver, GERD (gastroesophageal reflux disease), Hep C w/o coma, chronic ([facility] History of alcohol abuse, History of blood transfusion, History of colon polyps, History of tobacco abuse, [place] (hard of hearing), Hyperlipidemia, Hypertension, Port-A-Cath in place, Sciatica, Spinal stenosis, Stomach ulcer, Tubular adenoma of colon, Vitamin D deficiency, and Wears glasses. Social History:   reports that he quit smoking about 5 years ago. He has a 45.00 pack-year smoking history. He has never used smokeless tobacco. He reports previous alcohol use. He reports previous drug use. Frequency: 1.00 time per week. Family History:   Family History   Problem Relation Age of Onset   Combs Cancer Mother         pancreatic    Cancer Father         bone    Diabetes Sister     Asthma Brother        Vitals:  /63   Pulse 79   Temp 98 °F (36.7 °C) (Oral)   Resp 18   Ht 5' 10\" (1.778 m)   Wt 200 lb (90.7 kg)   SpO2 96%   BMI 28.70 kg/m²   Temp (24hrs), Av.2 °F (36.8 °C), Min:98 °F (36.7 °C), Max:98.8 °F (37.1 °C)    No results for input(s): POCGLU in the last 72 hours. I/O(24Hr): Intake/Output Summary (Last 24 hours) at 2022 0718  Last data filed at 2022 0500  Gross per 24 hour   Intake 360 ml   Output 1000 ml   Net -640 ml       Labs:  [unfilled]    Lab Results   Component Value Date/Time    SPECIAL NOT REPORTED 2022 11:28 AM     Lab Results   Component Value Date/Time    CULTURE NO GROWTH 6 DAYS 2022 09:00 AM       [facility]    Radiology:    No results found. Physical Examination:        Physical Exam  Constitutional:       Appearance: Normal appearance. HENT:      Head: Normocephalic and atraumatic. Cardiovascular:      Rate and Rhythm: Normal rate and regular rhythm. Pulses: Normal pulses. Heart sounds: Normal heart sounds. No murmur heard. No friction rub.  No gallop. Pulmonary:      Effort: Pulmonary effort is normal. No respiratory distress. Breath sounds: Normal breath sounds. No stridor. No wheezing, rhonchi or rales. Abdominal:      General: Abdomen is flat. There is no distension. Palpations: Abdomen is soft. There is no mass. Tenderness: There is no abdominal tenderness. There is no guarding. Hernia: No hernia is present. Neurological:      Mental Status: He is alert. Assessment:        Primary Problem  Hyponatremia    Active Hospital Problems    Diagnosis Date Noted    Drop in hemoglobin [R71.0] 06/03/2022     Priority: Medium    Portal hypertension ([facility] [K76.6]      Priority: Medium    Cirrhosis ([facility] [K74.60]     Hyponatremia [E87.1] 07/20/2016       Plan:        1. Hyponatremia  -Sodium level in the , this morning 128  -Nephrology consulted              -Started normal saline 50 cc/h to avoid fast correction                         -Monitor strict urine output              -Fluid restriction of 1500 mils per day              -Lasix 20 mg twice daily resumed     2.  Drop in hemoglobin secondary to cirrhosis versus GI bleed  -Hemoglobin this morning 6.9  -Patient's status post 1 unit of PRBCs on second day of admission  -Initial FOBT negative, repeat FOBT positive  -Lovenox held  -GI consulted              -Added octreotide for possible bleed secondary to portal hypertension              -Added Protonix 40 mg IV twice daily              -Plan for EGD on monday              -Continue nadolol and lactulose     3.  Hypotension  -Midodrine 5 mg 3 times as needed              -Hold if systolic blood pressure less than 110       Renetta Isaac MD  6/5/2022  7:45 AM     Attestation and add on       I have discussed the care of Juan Carlos Ornelas , including pertinent history and exam findings,      6/5/22    with the resident.   I have seen and examined the patient and the key elements of all parts of the encounter have been performed by me . I agree with the assessment, plan and orders as documented by the resident. Hospital Problems           Last Modified POA    * (Principal) Hyponatremia 6/2/2022 Yes    Drop in hemoglobin 6/3/2022 Yes    Portal hypertension ([facility] [address] 6/3/2022 Yes    Cirrhosis ([facility] [address] [facility] 6/2/2022 Yes             ''''''''''       MD CHANDNI Riley [address], [address] [place] [address].    Phone [phone]   Fax: [phone]  Answering Service: [phone]

## 2022-06-06 ENCOUNTER — ANESTHESIA (OUTPATIENT)
Dept: ENDOSCOPY | Age: 62
DRG: 641 | End: 2022-06-06
Payer: MEDICARE

## 2022-06-06 ENCOUNTER — ANESTHESIA EVENT (OUTPATIENT)
Dept: ENDOSCOPY | Age: 62
DRG: 641 | End: 2022-06-06
Payer: MEDICARE

## 2022-06-06 LAB
A1 LECTIN: NEGATIVE
ABO/RH: NORMAL
ABSOLUTE EOS #: 0.04 K/UL (ref 0–0.4)
ABSOLUTE LYMPH #: 0.48 K/UL (ref 1–4.8)
ABSOLUTE MONO #: 0.78 K/UL (ref 0.1–1.3)
ALBUMIN SERPL-MCNC: 2.3 G/DL (ref 3.5–5.2)
ALP BLD-CCNC: 89 U/L (ref 40–129)
ALT SERPL-CCNC: 8 U/L (ref 5–41)
ANION GAP SERPL CALCULATED.3IONS-SCNC: 8 MMOL/L (ref 9–17)
ANTIBODY SCREEN: NEGATIVE
ARM BAND NUMBER: NORMAL
AST SERPL-CCNC: 18 U/L
BASOPHILS # BLD: 1 % (ref 0–2)
BASOPHILS ABSOLUTE: 0.04 K/UL (ref 0–0.2)
BILIRUB SERPL-MCNC: 1.88 MG/DL (ref 0.3–1.2)
BLD PROD TYP BPU: NORMAL
BLOOD BANK BLOOD PRODUCT EXPIRATION DATE: NORMAL
BLOOD BANK COMMENT: NORMAL
BLOOD BANK ISBT PRODUCT BLOOD TYPE: 5100
BLOOD BANK PRODUCT CODE: NORMAL
BLOOD BANK UNIT TYPE AND RH: NORMAL
BPU ID: NORMAL
BUN BLDV-MCNC: 6 MG/DL (ref 8–23)
CALCIUM SERPL-MCNC: 7.9 MG/DL (ref 8.6–10.4)
CHLORIDE BLD-SCNC: 102 MMOL/L (ref 98–107)
CO2: 22 MMOL/L (ref 20–31)
CREAT SERPL-MCNC: 0.69 MG/DL (ref 0.7–1.2)
CROSSMATCH RESULT: NORMAL
DISPENSE STATUS BLOOD BANK: NORMAL
EOSINOPHILS RELATIVE PERCENT: 1 % (ref 0–4)
EXPIRATION DATE: NORMAL
GFR AFRICAN AMERICAN: >60 ML/MIN
GFR NON-AFRICAN AMERICAN: >60 ML/MIN
GFR SERPL CREATININE-BSD FRML MDRD: ABNORMAL ML/MIN/{1.73_M2}
GLUCOSE BLD-MCNC: 94 MG/DL (ref 70–99)
HCT VFR BLD CALC: 21.9 % (ref 41–53)
HCT VFR BLD CALC: 27.2 % (ref 41–53)
HCT VFR BLD CALC: 28 % (ref 41–53)
HEMOGLOBIN: 6.9 G/DL (ref 13.5–17.5)
HEMOGLOBIN: 8.5 G/DL (ref 13.5–17.5)
HEMOGLOBIN: 8.8 G/DL (ref 13.5–17.5)
LYMPHOCYTES # BLD: 13 % (ref 24–44)
MCH RBC QN AUTO: 28.1 PG (ref 26–34)
MCHC RBC AUTO-ENTMCNC: 31.6 G/DL (ref 31–37)
MCV RBC AUTO: 88.9 FL (ref 80–100)
MONOCYTES # BLD: 21 % (ref 1–7)
MORPHOLOGY: ABNORMAL
PDW BLD-RTO: 15.9 % (ref 11.5–14.9)
PLATELET # BLD: 117 K/UL (ref 150–450)
PMV BLD AUTO: 7.1 FL (ref 6–12)
POTASSIUM SERPL-SCNC: 4.1 MMOL/L (ref 3.7–5.3)
RBC # BLD: 2.47 M/UL (ref 4.5–5.9)
SEG NEUTROPHILS: 64 % (ref 36–66)
SEGMENTED NEUTROPHILS ABSOLUTE COUNT: 2.36 K/UL (ref 1.3–9.1)
SODIUM BLD-SCNC: 132 MMOL/L (ref 135–144)
SURGICAL PATHOLOGY REPORT: NORMAL
TOTAL PROTEIN: 4.2 G/DL (ref 6.4–8.3)
TRANSFUSION STATUS: NORMAL
UNIT DIVISION: 0
UNIT ISSUE DATE/TIME: NORMAL
WBC # BLD: 3.7 K/UL (ref 3.5–11)

## 2022-06-06 PROCEDURE — 85025 COMPLETE CBC W/AUTO DIFF WBC: CPT

## 2022-06-06 PROCEDURE — 6360000002 HC RX W HCPCS

## 2022-06-06 PROCEDURE — 6370000000 HC RX 637 (ALT 250 FOR IP): Performed by: STUDENT IN AN ORGANIZED HEALTH CARE EDUCATION/TRAINING PROGRAM

## 2022-06-06 PROCEDURE — 6370000000 HC RX 637 (ALT 250 FOR IP): Performed by: INTERNAL MEDICINE

## 2022-06-06 PROCEDURE — C9113 INJ PANTOPRAZOLE SODIUM, VIA: HCPCS | Performed by: INTERNAL MEDICINE

## 2022-06-06 PROCEDURE — 06L38CZ OCCLUSION OF ESOPHAGEAL VEIN WITH EXTRALUMINAL DEVICE, VIA NATURAL OR ARTIFICIAL OPENING ENDOSCOPIC: ICD-10-PCS | Performed by: INTERNAL MEDICINE

## 2022-06-06 PROCEDURE — 3700000000 HC ANESTHESIA ATTENDED CARE: Performed by: INTERNAL MEDICINE

## 2022-06-06 PROCEDURE — 7100000001 HC PACU RECOVERY - ADDTL 15 MIN: Performed by: INTERNAL MEDICINE

## 2022-06-06 PROCEDURE — 2580000003 HC RX 258: Performed by: INTERNAL MEDICINE

## 2022-06-06 PROCEDURE — 2500000003 HC RX 250 WO HCPCS

## 2022-06-06 PROCEDURE — 2580000003 HC RX 258

## 2022-06-06 PROCEDURE — C9113 INJ PANTOPRAZOLE SODIUM, VIA: HCPCS | Performed by: NURSE PRACTITIONER

## 2022-06-06 PROCEDURE — 6360000002 HC RX W HCPCS: Performed by: INTERNAL MEDICINE

## 2022-06-06 PROCEDURE — 3609012300 HC EGD BAND LIGATION ESOPHGEAL/GASTRIC VARICES: Performed by: INTERNAL MEDICINE

## 2022-06-06 PROCEDURE — 2709999900 HC NON-CHARGEABLE SUPPLY: Performed by: INTERNAL MEDICINE

## 2022-06-06 PROCEDURE — 85014 HEMATOCRIT: CPT

## 2022-06-06 PROCEDURE — 36430 TRANSFUSION BLD/BLD COMPNT: CPT

## 2022-06-06 PROCEDURE — 80053 COMPREHEN METABOLIC PANEL: CPT

## 2022-06-06 PROCEDURE — 86850 RBC ANTIBODY SCREEN: CPT

## 2022-06-06 PROCEDURE — 99233 SBSQ HOSP IP/OBS HIGH 50: CPT | Performed by: INTERNAL MEDICINE

## 2022-06-06 PROCEDURE — 85018 HEMOGLOBIN: CPT

## 2022-06-06 PROCEDURE — 86900 BLOOD TYPING SEROLOGIC ABO: CPT

## 2022-06-06 PROCEDURE — 43244 EGD VARICES LIGATION: CPT | Performed by: INTERNAL MEDICINE

## 2022-06-06 PROCEDURE — 6360000002 HC RX W HCPCS: Performed by: NURSE PRACTITIONER

## 2022-06-06 PROCEDURE — 7100000000 HC PACU RECOVERY - FIRST 15 MIN: Performed by: INTERNAL MEDICINE

## 2022-06-06 PROCEDURE — 3700000001 HC ADD 15 MINUTES (ANESTHESIA): Performed by: INTERNAL MEDICINE

## 2022-06-06 PROCEDURE — P9016 RBC LEUKOCYTES REDUCED: HCPCS

## 2022-06-06 PROCEDURE — 2060000000 HC ICU INTERMEDIATE R&B

## 2022-06-06 PROCEDURE — 86901 BLOOD TYPING SEROLOGIC RH(D): CPT

## 2022-06-06 PROCEDURE — 36415 COLL VENOUS BLD VENIPUNCTURE: CPT

## 2022-06-06 PROCEDURE — 2580000003 HC RX 258: Performed by: NURSE PRACTITIONER

## 2022-06-06 PROCEDURE — 2720000010 HC SURG SUPPLY STERILE: Performed by: INTERNAL MEDICINE

## 2022-06-06 PROCEDURE — 6370000000 HC RX 637 (ALT 250 FOR IP): Performed by: NURSE PRACTITIONER

## 2022-06-06 PROCEDURE — 86920 COMPATIBILITY TEST SPIN: CPT

## 2022-06-06 PROCEDURE — A4216 STERILE WATER/SALINE, 10 ML: HCPCS | Performed by: INTERNAL MEDICINE

## 2022-06-06 PROCEDURE — 2580000003 HC RX 258: Performed by: STUDENT IN AN ORGANIZED HEALTH CARE EDUCATION/TRAINING PROGRAM

## 2022-06-06 RX ORDER — FENTANYL CITRATE 50 UG/ML
50 INJECTION, SOLUTION INTRAMUSCULAR; INTRAVENOUS EVERY 5 MIN PRN
Status: DISCONTINUED | OUTPATIENT
Start: 2022-06-06 | End: 2022-06-09 | Stop reason: HOSPADM

## 2022-06-06 RX ORDER — ONDANSETRON 2 MG/ML
4 INJECTION INTRAMUSCULAR; INTRAVENOUS
Status: ACTIVE | OUTPATIENT
Start: 2022-06-06 | End: 2022-06-06

## 2022-06-06 RX ORDER — MEPERIDINE HYDROCHLORIDE 25 MG/ML
12.5 INJECTION INTRAMUSCULAR; INTRAVENOUS; SUBCUTANEOUS EVERY 5 MIN PRN
Status: DISCONTINUED | OUTPATIENT
Start: 2022-06-06 | End: 2022-06-09 | Stop reason: HOSPADM

## 2022-06-06 RX ORDER — FENTANYL CITRATE 50 UG/ML
25 INJECTION, SOLUTION INTRAMUSCULAR; INTRAVENOUS EVERY 5 MIN PRN
Status: DISCONTINUED | OUTPATIENT
Start: 2022-06-06 | End: 2022-06-09 | Stop reason: HOSPADM

## 2022-06-06 RX ORDER — SODIUM CHLORIDE 0.9 % (FLUSH) 0.9 %
5-40 SYRINGE (ML) INJECTION PRN
Status: DISCONTINUED | OUTPATIENT
Start: 2022-06-06 | End: 2022-06-09 | Stop reason: HOSPADM

## 2022-06-06 RX ORDER — SODIUM CHLORIDE 9 MG/ML
INJECTION, SOLUTION INTRAVENOUS PRN
Status: DISCONTINUED | OUTPATIENT
Start: 2022-06-06 | End: 2022-06-09 | Stop reason: HOSPADM

## 2022-06-06 RX ORDER — DIPHENHYDRAMINE HYDROCHLORIDE 50 MG/ML
12.5 INJECTION INTRAMUSCULAR; INTRAVENOUS
Status: ACTIVE | OUTPATIENT
Start: 2022-06-06 | End: 2022-06-06

## 2022-06-06 RX ORDER — SODIUM CHLORIDE 0.9 % (FLUSH) 0.9 %
5-40 SYRINGE (ML) INJECTION EVERY 12 HOURS SCHEDULED
Status: DISCONTINUED | OUTPATIENT
Start: 2022-06-06 | End: 2022-06-09 | Stop reason: HOSPADM

## 2022-06-06 RX ORDER — LIDOCAINE HYDROCHLORIDE 20 MG/ML
INJECTION, SOLUTION EPIDURAL; INFILTRATION; INTRACAUDAL; PERINEURAL PRN
Status: DISCONTINUED | OUTPATIENT
Start: 2022-06-06 | End: 2022-06-06 | Stop reason: SDUPTHER

## 2022-06-06 RX ORDER — SODIUM CHLORIDE 9 MG/ML
INJECTION, SOLUTION INTRAVENOUS PRN
Status: COMPLETED | OUTPATIENT
Start: 2022-06-06 | End: 2022-06-06

## 2022-06-06 RX ORDER — SUCRALFATE 1 G/1
1 TABLET ORAL EVERY 6 HOURS SCHEDULED
Status: DISCONTINUED | OUTPATIENT
Start: 2022-06-06 | End: 2022-06-09 | Stop reason: HOSPADM

## 2022-06-06 RX ORDER — PROPOFOL 10 MG/ML
INJECTION, EMULSION INTRAVENOUS PRN
Status: DISCONTINUED | OUTPATIENT
Start: 2022-06-06 | End: 2022-06-06 | Stop reason: SDUPTHER

## 2022-06-06 RX ADMIN — LIDOCAINE HYDROCHLORIDE 80 MG: 20 INJECTION, SOLUTION EPIDURAL; INFILTRATION; INTRACAUDAL; PERINEURAL at 12:32

## 2022-06-06 RX ADMIN — ATORVASTATIN CALCIUM 20 MG: 20 TABLET, FILM COATED ORAL at 20:50

## 2022-06-06 RX ADMIN — MIDODRINE HYDROCHLORIDE 5 MG: 5 TABLET ORAL at 08:45

## 2022-06-06 RX ADMIN — SUCRALFATE 1 G: 1 TABLET ORAL at 16:33

## 2022-06-06 RX ADMIN — PANTOPRAZOLE SODIUM 40 MG: 40 TABLET, DELAYED RELEASE ORAL at 08:45

## 2022-06-06 RX ADMIN — SODIUM CHLORIDE: 9 INJECTION, SOLUTION INTRAVENOUS at 12:44

## 2022-06-06 RX ADMIN — PANTOPRAZOLE SODIUM 8 MG/HR: 40 INJECTION, POWDER, FOR SOLUTION INTRAVENOUS at 16:25

## 2022-06-06 RX ADMIN — CEFTRIAXONE SODIUM 1000 MG: 1 INJECTION, POWDER, FOR SOLUTION INTRAMUSCULAR; INTRAVENOUS at 16:10

## 2022-06-06 RX ADMIN — SODIUM CHLORIDE: 9 INJECTION, SOLUTION INTRAVENOUS at 12:25

## 2022-06-06 RX ADMIN — SODIUM CHLORIDE: 9 INJECTION, SOLUTION INTRAVENOUS at 00:25

## 2022-06-06 RX ADMIN — PANTOPRAZOLE SODIUM 80 MG: 40 INJECTION, POWDER, FOR SOLUTION INTRAVENOUS at 16:07

## 2022-06-06 RX ADMIN — PROPOFOL 260 MG: 10 INJECTION, EMULSION INTRAVENOUS at 12:32

## 2022-06-06 RX ADMIN — SODIUM CHLORIDE 40 MG: 9 INJECTION INTRAMUSCULAR; INTRAVENOUS; SUBCUTANEOUS at 20:50

## 2022-06-06 RX ADMIN — ONDANSETRON 4 MG: 4 TABLET, ORALLY DISINTEGRATING ORAL at 21:01

## 2022-06-06 RX ADMIN — SODIUM CHLORIDE, PRESERVATIVE FREE 10 ML: 5 INJECTION INTRAVENOUS at 08:46

## 2022-06-06 RX ADMIN — OCTREOTIDE ACETATE 50 MCG/HR: 500 INJECTION, SOLUTION INTRAVENOUS; SUBCUTANEOUS at 08:43

## 2022-06-06 ASSESSMENT — ENCOUNTER SYMPTOMS
SHORTNESS OF BREATH: 1
VOMITING: 0
CONSTIPATION: 0
DIARRHEA: 0
SHORTNESS OF BREATH: 0
NAUSEA: 0
ABDOMINAL PAIN: 0

## 2022-06-06 ASSESSMENT — LIFESTYLE VARIABLES: SMOKING_STATUS: 1

## 2022-06-06 NOTE — ANESTHESIA POSTPROCEDURE EVALUATION
Department of Anesthesiology  Postprocedure Note    Patient: Elizabeth Figueredo  MRN: [id number]  YOB: 1960  Date of evaluation: 6/6/2022  Time:  2:34 PM     Procedure Summary     Date: 06/06/22 Room / Location: [place] [address] 03 / [address] [facility]    Anesthesia Start: 0927 Anesthesia Stop: 1250    Procedure: EGD BAND LIGATION (N/A Esophagus) Diagnosis:       Iron deficiency anemia due to chronic blood loss      Colon cancer screening      (ANEMIA MELENA [id number])    Surgeons: Ana Ivory MD Responsible Provider: Nhi Rivera MD    Anesthesia Type: general ASA Status: 3          Anesthesia Type: No value filed. Yen Phase I: Yen Score: 10    Yen Phase II:      Last vitals: Reviewed and per EMR flowsheets.        Anesthesia Post Evaluation    Comments: POST- ANESTHESIA EVALUATION       Pt Name: Elizabeth Figueredo  MRN: [id number]  YOB: 1960  Date of evaluation: 6/6/2022  Time:  2:34 PM      /61   Pulse 58   Temp 98.2 °F (36.8 °C) (Oral)   Resp 16   Ht 5' 10\" (1.778 m)   Wt 198 lb 6.6 oz (90 kg)   SpO2 100%   BMI 28.47 kg/m²      Consciousness Level  Awake  Cardiopulmonary Status  Stable  Pain Adequately Treated YES  Nausea / Vomiting  NO  Adequate Hydration  YES  Anesthesia Related Complications NONE      Electronically signed by Nhi Rivera MD on 6/6/2022 at 2:34 PM

## 2022-06-06 NOTE — PROGRESS NOTES
Nephrology Progress Note    Subjective/   57y.o. year old male who we are seeing in consultation for hyponatremia. Interval history:  Patient seen and examined, no new complaints. Serum sodium improved kidney function within normal limits  Schedule for EGD      History of Present Illness: This is a 57 y.o. male with history of alcoholic liver disease, cirrhosis complicated by portal hypertension and ascites, history of esophageal cancer 1 year ago status post chemoradiation therapy. He presented to the ER of [facility] with fatigue and nausea over the last 1 week. He indicated poor appetite, dull  abdominal ache, muscle weakness and unsteady gait. He denied any headaches or seizures. He was found on laboratory studies to have a serum sodium of 119 mmol/L[baseline serum sodium  128 to 132 mmol/L]. He has been keeping to a fluid restriction and is on Aldactone 100 mg 3 times daily and Lasix 40 mg 3 times daily. Denies any use of thiazide diuretics. Home medication includes Prozac-SSRI. He denies any decrease in urine output. This morning he was noted to hypotensive with hemoglobin of  5.9 gm/dl. He currently is on 0.9 saline at 75 cc/h and serum sodium has been decreased to 128 mmol/L.   Denies any rectal bleeding or melena.     Objective/     Vitals:    06/06/22 1310 06/06/22 1320 06/06/22 1330 06/06/22 1415   BP: 106/62 128/60  111/61   Pulse: 59 58 60 58   Resp: 17 13 17 16   Temp: 97.8 °F (36.6 °C)   98.2 °F (36.8 °C)   TempSrc:    Oral   SpO2: 96% 95% 97% 100%   Weight:       Height:         24HR INTAKE/OUTPUT:      Intake/Output Summary (Last 24 hours) at 6/6/2022 1814  Last data filed at 6/6/2022 1809  Gross per 24 hour   Intake 1708.33 ml   Output 1100 ml   Net 608.33 ml     Patient Vitals for the past 96 hrs (Last 3 readings):   Weight   06/06/22 0515 198 lb 6.6 oz (90 kg)       Constitutional:  Alert, awake, no apparent distress  Cardiovascular:  S1, S2 without m/r/g  Respiratory: CTA B without w/r/r  Abdomen: +bs, soft, nt  Ext: nil LE edema. Data/  Recent Labs     06/04/22  0452 06/04/22  1458 06/05/22  0344 06/05/22  1133 06/05/22  2024 06/06/22  0502 06/06/22  1443   WBC 4.0  --  4.4  --   --  3.7  --    HGB 7.1*   < > 6.9*   < > 7.2* 6.9* 8.8*   HCT 22.5*   < > 22.5*   < > 23.3* 21.9* 28.0*   MCV 89.1  --  91.3  --   --  88.9  --    *  --  118*  --   --  117*  --     < > = values in this interval not displayed. Recent Labs     06/04/22  0452 06/04/22  1040 06/05/22  0344 06/05/22  0344 06/05/22  0918 06/05/22  1749 06/06/22  0502   *   < > 128*   < > 130* 130* 132*   K 3.5*   < > 3.9   < > 4.2 4.4 4.1   CL 92*   < > 96*   < > 97* 97* 102   CO2 24   < > 23   < > 23 24 22   GLUCOSE 157*  --  105*  --   --   --  94   MG 1.5*  --   --   --   --   --   --    BUN 10  --  8  --   --   --  6*   CREATININE 0.79  --  0.72  --   --   --  0.69*   LABGLOM >60  --  >60  --   --   --  >60   GFRAA >60  --  >60  --   --   --  >60    < > = values in this interval not displayed. Assessment/   1. Acute on chronic hypotonic hyponatremia with recent worsening to  119 mmol/l. Urine sodium is less than 20 and urine osmolality 119, likely secondary to decreased effective arterial volume and liver disease + decrease in solute intake  SIADH is a differential with the use of Prozac but not supported by low urine sodium and urine osmolarity. Serum sodium gradually improving and at goal today of 130 mmol/L     2. Chronic liver disease complicated by cirrhosis and ascites[abdominal paracentesis 1 month ago with removal of 11 L]     3.  Acute kidney injury secondary to prerenal component from diuretic-resolved     4. Acute anemia-rule out blood loss-Hb 7.0 gm/dl    5. Hypotension  Plan/   IV fluids with 0.9 saline at 50 mils per hour.   Serum sodium can be checked every 12 hrly  Monitor strict urine output  Midodrine 5 mg 3 times daily for systolic less than 90  Oral Fluid restriction of 1500 mls/day  Plan for EGD      Darek Javier MD

## 2022-06-06 NOTE — ANESTHESIA PRE PROCEDURE
Department of Anesthesiology  Preprocedure Note       Name:  Rocael Ibrahim   Age:  57 y.o.  :  1960                                          MRN:  [id number]         Date:  2022      Surgeon: Alice Howell):  Raquel Best MD    Procedure: Procedure(s):  EGD ESOPHAGOGASTRODUODENOSCOPY    Medications prior to admission:   Prior to Admission medications    Medication Sig Start Date End Date Taking?  Authorizing Provider   furosemide (LASIX) 40 MG tablet Take 40 mg by mouth 3 times daily   Yes Historical Provider, MD   spironolactone (ALDACTONE) 100 MG tablet Take 200 mg by mouth 3 times daily    Yes Historical Provider, MD   vitamin D (CHOLECALCIFEROL) 25 MCG (1000 UT) TABS tablet Take 1,000 Units by mouth daily   Yes Historical Provider, MD   ondansetron (ZOFRAN-ODT) 4 MG disintegrating tablet dissolve 1 tablet by mouth every 8 hours if needed for nausea and vomiting 22   VASU Madrigal   lactulose [facility]) 10 GM/15ML solution take 30 milliliters by mouth three times a day 22   Chacorta Rankin PA   nadolol (CORGARD) 20 MG tablet take 1 tablet by mouth once daily 22   Historical Provider, MD   FLUoxetine (PROZAC) 20 MG capsule Take 1 capsule by mouth daily 22   Manny Brandt MD   atorvastatin (LIPITOR) 20 MG tablet Take 1 tablet by mouth nightly 22   Manny Brandt MD   midodrine (PROAMATINE) 5 MG tablet Take 1 tablet by mouth 3 times daily as needed (SBP <110) 22   Manny Brandt MD   ferrous sulfate (IRON 325) 325 (65 Fe) MG tablet Take 1 tablet by mouth 2 times daily 22   Chacorta Rankin, PA   omeprazole (PRILOSEC) 40 MG delayed release capsule take 1 capsule by mouth EVERY MORNING BEFORE BREAKFAST 21   Arden Cranker, MD       Current medications:    Current Facility-Administered Medications   Medication Dose Route Frequency Provider Last Rate Last Admin    0.9 % sodium chloride infusion   IntraVENous PRN Josh Knight MD        iron sucrose (VENOFER) 200 mg in sodium chloride 0.9 % 100 mL IVPB  200 mg IntraVENous Q24H Jameson Diaz MD   Stopped at 06/05/22 1511    pantoprazole (PROTONIX) tablet 40 mg  40 mg Oral BID AC MASSIMO Chavira - NP   40 mg at 06/06/22 0845    0.9 % sodium chloride infusion   IntraVENous Continuous Kushal Oneill MD 50 mL/hr at 06/06/22 0025 New Bag at 06/06/22 0025    octreotide (SANDOSTATIN) 500 mcg in sodium chloride 0.9 % 100 mL infusion  50 mcg/hr IntraVENous Continuous Shanita Lopez MD 10 mL/hr at 06/06/22 0843 50 mcg/hr at 06/06/22 0843    atorvastatin (LIPITOR) tablet 20 mg  20 mg Oral Nightly Koko Easton MD   20 mg at 06/05/22 2052    [Held by provider] furosemide (LASIX) tablet 20 mg  20 mg Oral BID Jameson Diaz MD   20 mg at 06/05/22 0846    lactulose (CHRONULAC) 10 GM/15ML solution 10 g  10 g Oral TID Jameson Diaz MD   10 g at 06/06/22 0845    midodrine (PROAMATINE) tablet 5 mg  5 mg Oral TID PRN Jameson Diaz MD   5 mg at 06/06/22 0845    nadolol (CORGARD) tablet 20 mg  20 mg Oral Daily Koko Easton MD   20 mg at 06/05/22 0846    [Held by provider] spironolactone (ALDACTONE) tablet 50 mg  50 mg Oral QPM Koko Easton MD        sodium chloride flush 0.9 % injection 5-40 mL  5-40 mL IntraVENous 2 times per day Jameson Diaz MD   10 mL at 06/06/22 0846    sodium chloride flush 0.9 % injection 5-40 mL  5-40 mL IntraVENous PRN Koko Easton MD        0.9 % sodium chloride infusion   IntraVENous PRN Koko Easton MD        [Held by provider] enoxaparin (LOVENOX) injection 40 mg  40 mg SubCUTAneous Daily Koko Easton MD   40 mg at 06/02/22 1821    ondansetron (ZOFRAN-ODT) disintegrating tablet 4 mg  4 mg Oral Q8H PRN Koko Easton MD        Or    ondansetron (ZOFRAN) injection 4 mg  4 mg IntraVENous Q6H PRN Koko Easton MD        polyethylene glycol (GLYCOLAX) packet 17 g  17 g Oral Daily PRN Koko Easton MD           Allergies:  No Known Allergies    Problem List:    Patient Active Problem List   Diagnosis Code    Back pain, chronic M54.9, G89.29    Hearing difficulty H91.90    GERD (gastroesophageal reflux disease) K21.9    Cervical radicular pain M54.12    Alcohol withdrawal syndrome without complication (HCC) F07.411    Hyponatremia E87.1    Hypomagnesemia E83.42    Chronic viral hepatitis B without delta agent and without coma (HCC) B18.1    Calculus of gallbladder without cholecystitis K80.20    Hep C w/o coma, chronic (HCC) B18.2    Fatty liver K76.0    Psychophysiologic insomnia F51.04    Cirrhosis (HCC) K74.60    Hepatic cirrhosis (HCC) K74.60    Vertebrogenic low back pain M54.51    DDD (degenerative disc disease), lumbar M51.36    Depression F32. A    Tubular adenoma of colon D12.6    History of colon polyps Z86.010    Gynecomastia, male N58    Lumbar radiculitis M54.16    Lumbar disc herniation M51.26    Tinnitus H93.19    Eustachian tube dysfunction H69.80    Ganglion cyst M67.40    Carpal tunnel syndrome of right wrist G56.01    History of hepatitis C Z86.19    Vitamin D deficiency E55.9    Pure hypercholesterolemia E78.00    Hypokalemia E87.6    Essential hypertension I10    Recurrent major depressive disorder in partial remission (HCC) F33.41    S/P epidural steroid injection Z92.241    Elevated LFTs R79.89    Seasonal allergies J30.2    Lumbar facet arthropathy M47.816    Cervical facet syndrome M47.812    Acute gastrointestinal bleeding K92.2    Thrombocytopenia (HCC) D69.6    Hepatitis C virus infection resolved after antiviral drug therapy Z86.19    Gastrointestinal hemorrhage with melena K92.1    Alcohol abuse F10.10    Altered mental status R41.82    Hypocalcemia E83.51    Hypophosphatemia E83.39    Malignant neoplasm of lower third of esophagus (HCC) C15.5    COVID-19 U07.1    Anxiety F41.9    Current smoker F17.200    Severe comorbid illness R69    Gait instability R26.81    Abnormal findings on diagnostic imaging of spine R93.7    Cervical spinal stenosis M48.02    Spinal stenosis of lumbar region with neurogenic claudication M48.062    Low hemoglobin D64.9    Symptomatic anemia, microcytic, acute D64.9    Hypotension I95.9    Former smoker, 50+ pack years, quit 2016 Z87.891    HLD (hyperlipidemia) E78.5    Esophageal adenocarcinoma (HCC) C15.9    Anemia D64.9    Acute kidney injury ([address] N17.9    Ascites R18.8    Shortness of breath R06.02    Drop in hemoglobin R71.0    Portal hypertension (HCC) K76.6       Past Medical History:        Diagnosis Date    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 Ele Lopez, orthopedic, every 3-4 months, gets steroid injection    Lezama esophagus     BPH (benign prostatic hypertrophy)     Cholelithiasis     Cirrhosis ([address]     COVID-19 12/2020    pt reports he had a positive test while at [facility] in 2020, was asymptomatic    COVID-19 vaccine series completed 5/20/2021, 6/22/2021    Moderna 5/20/2021, 6/22/2021    DDD (degenerative disc disease), lumbar     Depression     Esophageal cancer ([address]     INVASIVE ADENOCARCINOMA ARISING IN TUBULAR ADENOMA WITH HIGH GRADE DYSPLASIA, ASSOCIATED WITH FOCAL INTESTINAL METAPLASIA     Esophageal varices (HCC)     Fatty liver     GERD (gastroesophageal reflux disease)     Hep C w/o coma, chronic (HCC)     History of alcohol abuse     6-12 beers a day; quit drinking July 2016    History of blood transfusion     History of colon polyps 2016    History of tobacco abuse     [place] (hard of hearing)     Hyperlipidemia     Hypertension     Port-A-Cath in place     right upper chest    Sciatica     Spinal stenosis     Stomach ulcer     hx of    Tubular adenoma of colon 2016, 2018    Vitamin D deficiency     Wears glasses        Past Surgical History:        Procedure Laterality Date    BUNIONECTOMY      twice on right side    BUNIONECTOMY Left     CARPAL TUNNEL RELEASE Right     COLONOSCOPY      at age 35    COLONOSCOPY  10/05/2016    polyps-pathology tubular adenoma, and abnormal looking mucosa right colon-pathology-tubular adenoma    COLONOSCOPY N/A 3/30/2018    COLONOSCOPY POLYPECTOMY COLD BIOPSY performed by Eduin Ding MD at [facility]  03/30/2018    Small polyp in the sigmoid colon and excised with biopsy forceps--tubular adenoma    COLONOSCOPY N/A 4/16/2022    COLONOSCOPY POLYPECTOMY performed by Eduin Ding MD at [address], COLON, DIAGNOSTIC      EGD    IR PORT PLACEMENT EQUAL OR GREATER THAN 5 YEARS  4/19/2021    IR PORT PLACEMENT EQUAL OR GREATER THAN 5 YEARS 4/19/2021 [facility] SPECIAL PROCEDURES    KNEE SURGERY Left     cyst removed    NASAL SEPTUM SURGERY      NERVE BLOCK Right 11/23/2020    NERVE BLOCK RIGHT CERVICAL STEROID INJECTION  C3-C6 performed by Ava Solis MD at [facility]  01/04/16    steroid injection C7 T1    OTHER SURGICAL HISTORY  11/21/2016    Bilateral Lumbar CACHORRO L4-L5 injections    OTHER SURGICAL HISTORY  12/19/2016    lumbar steroid injection    OTHER SURGICAL HISTORY  09/28/2018    BILATERAL L5 CACHORRO (N/A Back)    OTHER SURGICAL HISTORY Right 11/23/2020    cervical injection    PAIN MANAGEMENT PROCEDURE Left 7/9/2020    EPIDURAL STEROID INJECTION LEFT L4 L5 performed by Ava Solis MD at [place] [address] Left 7/20/2020    LEFT L4 L5 EPIDURAL STEROID INJECTION performed by Ava Solis MD at [place] [id number] Bilateral 8/17/2020    LUMBAR FACET BILATERAL L2-L5 performed by Ava Solis MD at [place] [address] Bilateral 12/7/2020    NERVE BLOCK BILATERAL LUMBAR MEDIAL BRANCH L2-L5 performed by Ava Solis MD at [address] AA&/STRD TFRML EPI LUMBAR/SACRAL 1 LEVEL Bilateral 9/6/2018    BILATERAL L5 CACHORRO performed by Ava Solis MD at [address] AA&/STRD TFRML EPI LUMBAR/SACRAL 1 LEVEL N/A 2018    BILATERAL L5 CACHORRO performed by Nura Najera MD at [address] [place] [address] N/CARPAL TUNNEL SURG Right 2017    CARPAL TUNNEL RELEASE RIGHT performed by Chris Levin MD at [address] [place] [address]/CARPAL TUNNEL SURG Left 10/31/2017    CARPAL TUNNEL RELEASE performed by Chris Levin MD at [address] [place] [address] 2020    EGD BIOPSY performed by Mart Ramirez MD at [place] [address] 2021    EGD BIOPSY and spot marking performed by rAcadio Aragon MD at [place] [address] 2021    ENDOSCOPIC ULTRASOUND, EGD performed by Aditya Christy MD at [address] [place] [address]  2021    EGD DIAGNOSTIC ONLY performed by Aditya Christy MD at [address] [place] [address] N/A 2021    EGD BIOPSY performed by Arcadio Aragon MD at [place] [address] 2022    EGD BIOPSY performed by Arcadio Aragon MD at [place] [address] 4/15/2022    EGD ESOPHAGOGASTRODUODENOSCOPY performed by Mart Ramirez MD at [address] [place] [address] History:    Social History     Tobacco Use    Smoking status: Former Smoker     Packs/day: 1.00     Years: 45.00     Pack years: 45.00     Quit date: 2017     Years since quittin.3    Smokeless tobacco: Never Used   Substance Use Topics    Alcohol use: Not Currently     Comment: quit 2019                                Counseling given: Not Answered      Vital Signs (Current):   Vitals:    22 1900 22 2345 22 0515 22 0653   BP: 92/64 107/69  90/63   Pulse: 66 65  64   Resp: 18 18  18   Temp: 98.4 °F (36.9 °C) 98.1 °F (36.7 °C)  98.3 °F (36.8 °C)   TempSrc: Oral Oral  Oral   SpO2: 100% 100%  96%   Weight:   198 lb 6.6 oz (90 kg)    Height: BP Readings from Last 3 Encounters:   06/06/22 90/63   06/01/22 109/73   05/16/22 126/80       NPO Status:                                                                                 BMI:   Wt Readings from Last 3 Encounters:   06/06/22 198 lb 6.6 oz (90 kg)   06/01/22 199 lb 9.6 oz (90.5 kg)   05/16/22 200 lb (90.7 kg)     Body mass index is 28.47 kg/m². CBC:   Lab Results   Component Value Date    WBC 3.7 06/06/2022    RBC 2.47 06/06/2022    RBC 4.75 04/19/2012    HGB 6.9 06/06/2022    HCT 21.9 06/06/2022    MCV 88.9 06/06/2022    RDW 15.9 06/06/2022     06/06/2022     04/19/2012       CMP:   Lab Results   Component Value Date     06/06/2022    K 4.1 06/06/2022     06/06/2022    CO2 22 06/06/2022    BUN 6 06/06/2022    CREATININE 0.69 06/06/2022    GFRAA >60 06/06/2022    LABGLOM >60 06/06/2022    GLUCOSE 94 06/06/2022    GLUCOSE 65 04/19/2012    PROT 4.2 06/06/2022    CALCIUM 7.9 06/06/2022    BILITOT 1.88 06/06/2022    ALKPHOS 89 06/06/2022    AST 18 06/06/2022    ALT 8 06/06/2022       POC Tests: No results for input(s): POCGLU, POCNA, POCK, POCCL, POCBUN, POCHEMO, POCHCT in the last 72 hours.     Coags:   Lab Results   Component Value Date    PROTIME 15.5 04/26/2022    INR 1.2 04/26/2022    APTT 34.6 04/26/2022       HCG (If Applicable): No results found for: PREGTESTUR, PREGSERUM, HCG, HCGQUANT     ABGs: No results found for: PHART, PO2ART, QGN9YVP, DCM7SXZ, BEART, F2UHGAVD     Type & Screen (If Applicable):  No results found for: LABABO, LABRH    Drug/Infectious Status (If Applicable):  Lab Results   Component Value Date    HEPCAB REACTIVE 12/23/2020       COVID-19 Screening (If Applicable):   Lab Results   Component Value Date    COVID19 Not Detected 04/14/2022    COVID19 Not Detected 02/02/2022    COVID19 Not Detected 07/17/2020           Anesthesia Evaluation  Patient summary reviewed and Nursing notes reviewed no history of anesthetic complications:   Airway: Mallampati: II  TM distance: >3 FB   Neck ROM: full  Mouth opening: > = 3 FB   Dental: normal exam         Pulmonary:normal exam  breath sounds clear to auscultation  (+) shortness of breath:  current smoker                           Cardiovascular:    (+) hypertension (now hypotensive):, hyperlipidemia      ECG reviewed  Rhythm: regular  Rate: normal  Echocardiogram reviewed               ROS comment: Mild left ventricular hypertrophy. Global left ventricular systolic function is normal.  Estimated LV EF 60-65 %. (12/2021)     Neuro/Psych:   (+) neuromuscular disease:, psychiatric history:depression/anxiety              ROS comment: DDD - Lumbar region  Cervical Stenosis GI/Hepatic/Renal:   (+) GERD:, PUD, hepatitis: C and B, liver disease (cirrhosis): portal hypertension,          ROS comment: H/o Esophageal Adenocarcinoma   FOBT positive  Lezama esophagus  Abdominal distension - pt admits to having Ascites in the past with drainage. Endo/Other:    (+) blood dyscrasia (being transfused): anemia:., electrolyte abnormalities (Hyponatremia), Cancer: Esophageal Adenocarcinoma s/p chemo and radiation. .    Cancer: Esophageal Adenocarcinoma s/p chemo and radiation. ROS comment:  History of alcohol abuse Abdominal:             Vascular: negative vascular ROS. Other Findings:           Anesthesia Plan      general     ASA 3       Induction: intravenous. MIPS: Prophylactic antiemetics administered. Anesthetic plan and risks discussed with patient. Plan discussed with CRNA.                     Cristóbal Llanos MD   6/6/2022

## 2022-06-06 NOTE — PLAN OF CARE
Problem: Discharge Planning  Goal: Discharge to home or other facility with appropriate resources  Flowsheets (Taken 6/6/2022 0326)  Discharge to home or other facility with appropriate resources:   Identify barriers to discharge with patient and caregiver   Arrange for needed discharge resources and transportation as appropriate  Note: Patient updated on plan of care , denies any questions or needs      Problem: Safety - Adult  Goal: Free from fall injury  Note: Patient remains safe and free from fall      Problem: ABCDS Injury Assessment  Goal: Absence of physical injury  Note: Patient remains safe and injury free      Problem: Pain  Goal: Verbalizes/displays adequate comfort level or baseline comfort level  Flowsheets (Taken 6/6/2022 0326)  Verbalizes/displays adequate comfort level or baseline comfort level:   Encourage patient to monitor pain and request assistance   Assess pain using appropriate pain scale

## 2022-06-06 NOTE — OP NOTE
[facility]     PROCEDURE DATE: 06/06/22    REFERRING PHYSICIAN: No ref. provider found     PRIMARY CARE PROVIDER: VASU Kapoor    ATTENDING PHYSICIAN: Laura Lord MD     HISTORY: Mr. Dorcas Ortiz is a 57 y.o. male who presents to the [place] [address] Endoscopy unit for upper endoscopy. The patient's clinical history is remarkable for melena in setting of cirrhosis. History of esophageal cancer status post resection. He is currently medically stable and appropriate for the planned procedure. PREOPERATIVE DIAGNOSIS: Melena in setting of cirrhosis. History of esophageal cancer status post resection. PROCEDURES:   1) Transoral Upper Endoscopy, esophageal band ligation. POSTOPERATIVE DIAGNOSIS:   F2 esophageal varices  Inflammatory polyp in distal esophagus  Evidence of prior distal esophagectomy  Mild to moderate portal hypertensive gastropathy    SPECIMENS: None    MEDICATIONS:   MAC per anesthesia    EBL: minimal    INSTRUMENT: Olympus GIF-H190 flexible Gastroscope. PREPARATION: The nature and character of the procedure as well as risks, benefits, and alternatives were discussed with the patient and informed consent was obtained. Complications were said to include, but were not limited to: medication allergy, medication reaction, cardiovascular and respiratory problems, bleeding, perforation, infection, and/or missed diagnosis. Following arrival in the endoscopy room, the patient was placed in the left lateral decubitus position and final time-out accomplished in the presence of the nursing staff. Baseline vital signs were obtained and reviewed, and IV sedation was subsequently initiated. FINDINGS:   Esophagus: The esophagus was inspected to the Z-line. The endoscopic exam showed evidence of prior distal esophagectomy. F2 varices were noted. A 6 to 7 mm inflammatory appearing polyp was noted at around 30 cm.   It was not clear if this is related to his prior cancer versus inflammatory polyp versus organized clot at the site of recent bleeding. We did not want to directly band this area as it will slough off and we will not be able to obtain any tissue diagnosis. We opted to band the varices distal and proximal to this area. Adequate decompression of varices was noted. Stomach: The stomach was inspected in both forward and retroflex fashion and was appropriately distensible. The cardia, fundus, incisura, antrum and pylorus were identified via direct visualization. The endoscopic exam showed mild to moderate portal hypertensive gastropathy in proximal stomach. Duodenum: The proximal small bowel was inspected through the bulb, sweep, and second portion of the duodenum. The endoscopic exam showed no pathology. RECOMMENDATIONS:   1) Transfer back to the floor for further management as per primary care team.   2) continue management with PPI drip, octreotide drip, and antibiotics. 3) Okay for clear liquid diet  4) Liquid Carafate for 2 weeks  5) Repeat EGD in in 3 to 4 days to reassess the polypoid structure.       Laura Lord MD [facility]

## 2022-06-06 NOTE — PLAN OF CARE
Patient is a 13-year-old male with history of cirrhosis secondary to hepatitis, came in due to an abnormal lab reading of hyponatremia of 119. Patient has been asymptomatic during hospital stay regarding hyponatremia. Nephrologist on board, patient is on 50 cc of normal saline per hour. Sodium this morning 132. During hospital stay patient's hemoglobin dropped from 8.2-5.9. FOBT positive. Patient required 2 units of PRBCs during hospital stay, 1 of which he is getting this morning. GI is on board, started him on octreotide drip, Protonix, lactulose and nadolol. Patient has a possible EGD today. We will be dropping this patient from our service.     [facility] Medicine Resident, PGY 1    6/6/2022  7:13 AM

## 2022-06-06 NOTE — CARE COORDINATION
ONGOING DISCHARGE PLAN:    Patient is alert and oriented x4. Spoke with patient regarding discharge plan and patient confirms that plan is still to discharge to home with [facility]   EGD today     HBG 6.9  On Liquid Diet  Octreotide  One unit of RBC today      Will continue to follow for additional discharge needs.     Electronically signed by Zina Marie RN on 6/6/2022 at 11:18 AM

## 2022-06-06 NOTE — PROGRESS NOTES
[address]    PROGRESS NOTE             6/6/2022    10:37 AM    Name:   aHven Bear  MRN:     [id number]     Acct:      [de-identified]   Room:   2121/2121-01   Day:  4  Admit Date:  6/2/2022 10:24 AM    PCP:  VASU Negro  Code Status:  Full Code    Subjective:     C/C:   Chief Complaint   Patient presents with    Abnormal Lab     Interval History Status: not changed. Patient seen examined at bedside. No acute events reported overnight. Patient's vitals within normal limits. Patient sodium trending up, this morning it was at 128. Patient is on sodium chloride IV running at 50 mill per hour. Patient's hemoglobin 6.9 this morning. Repeat FOBT positive. Patient is on nadolol, acute right, and Protonix IV 40 mg twice daily. Per GI note, possible EGD on Monday. Patient has no complaints this morning. Patient denies chest pain, shortness of breath, dizziness or lightheadedness. Brief History:   Patient is a 70-year-old male with history of cirrhosis secondary to hep C, scented to the ED today due to abnormal lab results. Magdalena Miranda was seen by GI yesterday and had lab work ordered which came back this morning and showed hyponatremia of 119.  Patient is complaining of mild symptoms such as nausea and fatigue but otherwise is asymptomatic.  Patient denies chest pain, shortness of breath, dizziness, lightheadedness, shortness of breath and vomiting.  Patient denies any blood in his stool or any changes in urination. Electrolyte panel done in the ED showed sodium of 119.  CBC unremarkable.  Patient's serum osmolarity is decreased at 259. Pressure in the ED 96/51.   Nephrology was consulted in the ED.     Patient is a 70-year-old male with history of cirrhosis secondary to hep C, scented to the ED today due to abnormal lab results. Magdalena Miranda was seen by GI yesterday and had lab work ordered which came back this morning and showed hyponatremia of 119.  Patient is complaining of mild symptoms such as nausea and fatigue but otherwise is asymptomatic.  Patient denies chest pain, shortness of breath, dizziness, lightheadedness, shortness of breath and vomiting.  Patient denies any blood in his stool or any changes in urination. Electrolyte panel done in the ED showed sodium of 119.  CBC unremarkable.  Patient's serum osmolarity is decreased at 259. Pressure in the ED 96/51. Nephrology was consulted in the ED. Review of Systems:     Review of Systems   Constitutional: Negative for chills and fever. Respiratory: Negative for shortness of breath. Cardiovascular: Negative for chest pain. Gastrointestinal: Negative for abdominal pain, constipation, diarrhea, nausea and vomiting. Genitourinary: Negative for dysuria. Neurological: Negative for dizziness, light-headedness, numbness and headaches. Medications:      Allergies:  No Known Allergies    Current Meds:   Scheduled Meds:    iron sucrose  200 mg IntraVENous Q24H    pantoprazole  40 mg Oral BID AC    atorvastatin  20 mg Oral Nightly    [Held by provider] furosemide  20 mg Oral BID    lactulose  10 g Oral TID    nadolol  20 mg Oral Daily    [Held by provider] spironolactone  50 mg Oral QPM    sodium chloride flush  5-40 mL IntraVENous 2 times per day    [Held by provider] enoxaparin  40 mg SubCUTAneous Daily     Continuous Infusions:    sodium chloride      sodium chloride      sodium chloride      sodium chloride 50 mL/hr at 06/06/22 0025    octreotide (SandoSTATIN) infusion 50 mcg/hr (06/06/22 0843)    sodium chloride       PRN Meds: sodium chloride, sodium chloride, sodium chloride, midodrine, sodium chloride flush, sodium chloride, ondansetron **OR** ondansetron, polyethylene glycol    Data:     Past Medical History:   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 ([facility] [address] Anxiety, Arthritis, Back pain, chronic, Lezama esophagus, BPH (benign prostatic hypertrophy), Cholelithiasis, Cirrhosis ([facility] [address] COVID-19, COVID-19 vaccine series completed, DDD (degenerative disc disease), lumbar, Depression, Esophageal cancer ([facility] [address] Esophageal varices ([facility] [address] Fatty liver, GERD (gastroesophageal reflux disease), Hep C w/o coma, chronic ([facility] [address] History of alcohol abuse, History of blood transfusion, History of colon polyps, History of tobacco abuse, [place] (hard of hearing), Hyperlipidemia, Hypertension, Port-A-Cath in place, Sciatica, Spinal stenosis, Stomach ulcer, Tubular adenoma of colon, Vitamin D deficiency, and Wears glasses. Social History:   reports that he quit smoking about 5 years ago. He has a 45.00 pack-year smoking history. He has never used smokeless tobacco. He reports previous alcohol use. He reports previous drug use. Frequency: 1.00 time per week. Family History:   Family History   Problem Relation Age of Onset   Radha Solum Cancer Mother         pancreatic    Cancer Father         bone    Diabetes Sister     Asthma Brother        Vitals:  BP 97/82   Pulse 64   Temp 98.5 °F (36.9 °C) (Oral)   Resp 18   Ht 5' 10\" (1.778 m)   Wt 198 lb 6.6 oz (90 kg)   SpO2 95%   BMI 28.47 kg/m²   Temp (24hrs), Av.4 °F (36.9 °C), Min:98.1 °F (36.7 °C), Max:98.8 °F (37.1 °C)    No results for input(s): POCGLU in the last 72 hours. I/O(24Hr): Intake/Output Summary (Last 24 hours) at 2022 1037  Last data filed at 2022 0700  Gross per 24 hour   Intake --   Output 1375 ml   Net -1375 ml       Labs:  [unfilled]    Lab Results   Component Value Date/Time    SPECIAL NOT REPORTED 2022 11:28 AM     Lab Results   Component Value Date/Time    CULTURE NO GROWTH 6 DAYS 2022 09:00 AM       [facility]    Radiology:    No results found. Physical Examination:        Physical Exam  Constitutional:       Appearance: Normal appearance. HENT:      Head: Normocephalic and atraumatic.    Cardiovascular:      Rate and Rhythm: Normal rate and regular rhythm. Pulses: Normal pulses. Heart sounds: Normal heart sounds. No murmur heard. No friction rub. No gallop. Pulmonary:      Effort: Pulmonary effort is normal. No respiratory distress. Breath sounds: Normal breath sounds. No stridor. No wheezing, rhonchi or rales. Abdominal:      General: Abdomen is flat. There is no distension. Palpations: Abdomen is soft. There is no mass. Tenderness: There is no abdominal tenderness. There is no guarding. Hernia: No hernia is present. Neurological:      Mental Status: He is alert. Assessment:        Primary Problem  Hyponatremia    Active Hospital Problems    Diagnosis Date Noted    Drop in hemoglobin [R71.0] 06/03/2022     Priority: Medium    Portal hypertension ([facility] [address] [K76.6]      Priority: Medium    Thrombocytopenia ([facility] [address] [D69.6] 12/23/2020    History of hepatitis C [Z86.19] 09/11/2017    Hepatic cirrhosis ([facility] [address] [K74.60] 09/15/2016    [facility] [K74.60]     Chronic viral hepatitis B without delta agent and without coma ([facility] [B18.1] 07/22/2016       Plan:        1.  Hyponatremia  -Sodium level in the , this morning 128  -Nephrology consulted              -Started normal saline 50 cc/h to avoid fast correction                         -Monitor strict urine output              -Fluid restriction of 1500 mils per day              -Lasix 20 mg twice daily resumed     2.  Drop in hemoglobin secondary to cirrhosis versus GI bleed  -Hemoglobin this morning 6.9  -Patient's status post 1 unit of PRBCs on second day of admission  -Initial FOBT negative, repeat FOBT positive  -Lovenox held  -GI consulted              -Added octreotide for possible bleed secondary to portal hypertension              -Added Protonix 40 mg IV twice daily              -Plan for EGD on monday              -Continue nadolol and lactulose     3.  Hypotension  -Midodrine 5 mg 3 times as needed              -Hold if systolic blood pressure less than 110       6/6/22    · Hb 6.9 today . Was 7.2 yesterday  · receivinga unit PRBC   ·  egd today  1. Hyponatremia corrected . 2. GI input     Patient has had an drop of hemoglobin etiology not clear. As he is known to have significant portal hypertensive gastropathy, it is possible that he last bled secondary to portal hypertension. We will keep him on liquid diet. We will start octreotide. If he continues to significant issues managing ascites and a stabilizing hemoglobin, may need TIPS procedure. Discussed with the nursing staff. Late patient in the morning.    3.        Principal Problem (Resolved):     Hyponatremia  Active Problems:    Drop in hemoglobin    Portal hypertension (HCC)    Chronic viral hepatitis B without delta agent and without coma (HCC)    Cirrhosis (HCC)    Hepatic cirrhosis (HCC)    History of hepatitis C    Thrombocytopenia (HCC)         Conner Colin MD  6/6/2022  10:37 AM

## 2022-06-07 PROBLEM — I85.10 SECONDARY ESOPHAGEAL VARICES (HCC): Status: ACTIVE | Noted: 2022-06-07

## 2022-06-07 PROBLEM — K22.81 ESOPHAGEAL POLYP: Status: ACTIVE | Noted: 2022-06-07

## 2022-06-07 LAB
ABSOLUTE BANDS #: 0.04 K/UL (ref 0–1)
ABSOLUTE EOS #: 0 K/UL (ref 0–0.4)
ABSOLUTE LYMPH #: 0.31 K/UL (ref 1–4.8)
ABSOLUTE MONO #: 0.44 K/UL (ref 0.1–1.3)
ALBUMIN SERPL-MCNC: 2.2 G/DL (ref 3.5–5.2)
ALP BLD-CCNC: 94 U/L (ref 40–129)
ALT SERPL-CCNC: 8 U/L (ref 5–41)
ANION GAP SERPL CALCULATED.3IONS-SCNC: 6 MMOL/L (ref 9–17)
AST SERPL-CCNC: 21 U/L
BANDS: 1 % (ref 0–10)
BASOPHILS # BLD: 1 % (ref 0–2)
BASOPHILS ABSOLUTE: 0.04 K/UL (ref 0–0.2)
BILIRUB SERPL-MCNC: 2.35 MG/DL (ref 0.3–1.2)
BUN BLDV-MCNC: 5 MG/DL (ref 8–23)
CALCIUM SERPL-MCNC: 8 MG/DL (ref 8.6–10.4)
CHLORIDE BLD-SCNC: 104 MMOL/L (ref 98–107)
CO2: 23 MMOL/L (ref 20–31)
CREAT SERPL-MCNC: 0.59 MG/DL (ref 0.7–1.2)
EOSINOPHILS RELATIVE PERCENT: 0 % (ref 0–4)
GFR AFRICAN AMERICAN: >60 ML/MIN
GFR NON-AFRICAN AMERICAN: >60 ML/MIN
GFR SERPL CREATININE-BSD FRML MDRD: ABNORMAL ML/MIN/{1.73_M2}
GLUCOSE BLD-MCNC: 105 MG/DL (ref 70–99)
HCT VFR BLD CALC: 26.3 % (ref 41–53)
HCT VFR BLD CALC: 26.6 % (ref 41–53)
HCT VFR BLD CALC: 27.5 % (ref 41–53)
HEMOGLOBIN: 8.2 G/DL (ref 13.5–17.5)
HEMOGLOBIN: 8.3 G/DL (ref 13.5–17.5)
HEMOGLOBIN: 8.4 G/DL (ref 13.5–17.5)
INR BLD: 1.4
LYMPHOCYTES # BLD: 7 % (ref 24–44)
MCH RBC QN AUTO: 28.5 PG (ref 26–34)
MCHC RBC AUTO-ENTMCNC: 31.4 G/DL (ref 31–37)
MCV RBC AUTO: 90.7 FL (ref 80–100)
MONOCYTES # BLD: 10 % (ref 1–7)
MORPHOLOGY: ABNORMAL
MORPHOLOGY: ABNORMAL
PATHOLOGIST REVIEW: NORMAL
PDW BLD-RTO: 15.8 % (ref 11.5–14.9)
PLATELET # BLD: 135 K/UL (ref 150–450)
PMV BLD AUTO: 7 FL (ref 6–12)
POTASSIUM SERPL-SCNC: 4.4 MMOL/L (ref 3.7–5.3)
PROTHROMBIN TIME: 16.9 SEC (ref 11.8–14.6)
RBC # BLD: 2.9 M/UL (ref 4.5–5.9)
SEG NEUTROPHILS: 81 % (ref 36–66)
SEGMENTED NEUTROPHILS ABSOLUTE COUNT: 3.57 K/UL (ref 1.3–9.1)
SODIUM BLD-SCNC: 133 MMOL/L (ref 135–144)
TOTAL PROTEIN: 4.2 G/DL (ref 6.4–8.3)
WBC # BLD: 4.4 K/UL (ref 3.5–11)

## 2022-06-07 PROCEDURE — P9047 ALBUMIN (HUMAN), 25%, 50ML: HCPCS | Performed by: NURSE PRACTITIONER

## 2022-06-07 PROCEDURE — 80053 COMPREHEN METABOLIC PANEL: CPT

## 2022-06-07 PROCEDURE — 2580000003 HC RX 258: Performed by: NURSE PRACTITIONER

## 2022-06-07 PROCEDURE — 6360000002 HC RX W HCPCS: Performed by: NURSE PRACTITIONER

## 2022-06-07 PROCEDURE — 6360000002 HC RX W HCPCS: Performed by: INTERNAL MEDICINE

## 2022-06-07 PROCEDURE — 99232 SBSQ HOSP IP/OBS MODERATE 35: CPT | Performed by: INTERNAL MEDICINE

## 2022-06-07 PROCEDURE — 36415 COLL VENOUS BLD VENIPUNCTURE: CPT

## 2022-06-07 PROCEDURE — 2580000003 HC RX 258: Performed by: INTERNAL MEDICINE

## 2022-06-07 PROCEDURE — 85025 COMPLETE CBC W/AUTO DIFF WBC: CPT

## 2022-06-07 PROCEDURE — 2060000000 HC ICU INTERMEDIATE R&B

## 2022-06-07 PROCEDURE — 85610 PROTHROMBIN TIME: CPT

## 2022-06-07 PROCEDURE — 85014 HEMATOCRIT: CPT

## 2022-06-07 PROCEDURE — C9113 INJ PANTOPRAZOLE SODIUM, VIA: HCPCS | Performed by: INTERNAL MEDICINE

## 2022-06-07 PROCEDURE — A4216 STERILE WATER/SALINE, 10 ML: HCPCS | Performed by: INTERNAL MEDICINE

## 2022-06-07 PROCEDURE — 85018 HEMOGLOBIN: CPT

## 2022-06-07 PROCEDURE — 6370000000 HC RX 637 (ALT 250 FOR IP): Performed by: INTERNAL MEDICINE

## 2022-06-07 PROCEDURE — APPSS30 APP SPLIT SHARED TIME 16-30 MINUTES: Performed by: NURSE PRACTITIONER

## 2022-06-07 RX ORDER — ALBUMIN (HUMAN) 12.5 G/50ML
25 SOLUTION INTRAVENOUS ONCE
Status: COMPLETED | OUTPATIENT
Start: 2022-06-07 | End: 2022-06-07

## 2022-06-07 RX ADMIN — LACTULOSE 10 G: 20 SOLUTION ORAL at 08:48

## 2022-06-07 RX ADMIN — IRON SUCROSE 200 MG: 20 INJECTION, SOLUTION INTRAVENOUS at 16:15

## 2022-06-07 RX ADMIN — SUCRALFATE 1 G: 1 TABLET ORAL at 00:53

## 2022-06-07 RX ADMIN — ALBUMIN (HUMAN) 25 G: 0.25 INJECTION, SOLUTION INTRAVENOUS at 14:28

## 2022-06-07 RX ADMIN — SUCRALFATE 1 G: 1 TABLET ORAL at 17:28

## 2022-06-07 RX ADMIN — SODIUM CHLORIDE 40 MG: 9 INJECTION INTRAMUSCULAR; INTRAVENOUS; SUBCUTANEOUS at 17:28

## 2022-06-07 RX ADMIN — NADOLOL 20 MG: 20 TABLET ORAL at 08:48

## 2022-06-07 RX ADMIN — OCTREOTIDE ACETATE 50 MCG/HR: 500 INJECTION, SOLUTION INTRAVENOUS; SUBCUTANEOUS at 20:45

## 2022-06-07 RX ADMIN — SODIUM CHLORIDE 40 MG: 9 INJECTION INTRAMUSCULAR; INTRAVENOUS; SUBCUTANEOUS at 07:32

## 2022-06-07 RX ADMIN — SODIUM CHLORIDE: 9 INJECTION, SOLUTION INTRAVENOUS at 17:25

## 2022-06-07 RX ADMIN — ATORVASTATIN CALCIUM 20 MG: 20 TABLET, FILM COATED ORAL at 20:46

## 2022-06-07 RX ADMIN — SUCRALFATE 1 G: 1 TABLET ORAL at 07:32

## 2022-06-07 RX ADMIN — CEFTRIAXONE SODIUM 1000 MG: 1 INJECTION, POWDER, FOR SOLUTION INTRAMUSCULAR; INTRAVENOUS at 15:40

## 2022-06-07 RX ADMIN — OCTREOTIDE ACETATE 50 MCG/HR: 500 INJECTION, SOLUTION INTRAVENOUS; SUBCUTANEOUS at 17:32

## 2022-06-07 RX ADMIN — SUCRALFATE 1 G: 1 TABLET ORAL at 23:18

## 2022-06-07 RX ADMIN — OCTREOTIDE ACETATE 50 MCG/HR: 500 INJECTION, SOLUTION INTRAVENOUS; SUBCUTANEOUS at 08:57

## 2022-06-07 ASSESSMENT — ENCOUNTER SYMPTOMS
SHORTNESS OF BREATH: 0
ABDOMINAL PAIN: 0
DIARRHEA: 0
CONSTIPATION: 0
VOMITING: 0
NAUSEA: 0

## 2022-06-07 NOTE — FLOWSHEET NOTE
06/07/22 1053   Encounter Summary   Encounter Overview/Reason   Encounter   Service Provided For: Patient   Referral/Consult From: Ralph   Last Encounter  06/07/22   Complexity of Encounter Low   Begin Time 1000   End Time  1015   Total Time Calculated 15 min   Spiritual/Emotional needs   Type Spiritual Support   Rituals, Rites and Sacraments   Type Anointing  (Fr Grimm 6/7/22)

## 2022-06-07 NOTE — PROGRESS NOTES
Nephrology Progress Note    Subjective/   57y.o. year old male who we are seeing in consultation for hyponatremia. Interval history:  Patient seen and examined, no new complaints. Serum sodium improved kidney function within normal limits  Status post EGD,, variceal banding by GI yesterday      History of Present Illness: This is a 57 y.o. male with history of alcoholic liver disease, cirrhosis complicated by portal hypertension and ascites, history of esophageal cancer 1 year ago status post chemoradiation therapy. He presented to the ER of [facility] with fatigue and nausea over the last 1 week. He indicated poor appetite, dull  abdominal ache, muscle weakness and unsteady gait. He denied any headaches or seizures. He was found on laboratory studies to have a serum sodium of 119 mmol/L[baseline serum sodium  128 to 132 mmol/L]. He has been keeping to a fluid restriction and is on Aldactone 100 mg 3 times daily and Lasix 40 mg 3 times daily. Denies any use of thiazide diuretics. Home medication includes Prozac-SSRI. He denies any decrease in urine output. This morning he was noted to hypotensive with hemoglobin of  5.9 gm/dl. He currently is on 0.9 saline at 75 cc/h and serum sodium has been decreased to 128 mmol/L.   Denies any rectal bleeding or melena.     Objective/     Vitals:    06/07/22 0000 06/07/22 0445 06/07/22 0645 06/07/22 1245   BP: (!) 92/59 124/72 117/71 121/78   Pulse: 66  73 75   Resp: 16  18 18   Temp: 98.6 °F (37 °C)  98.2 °F (36.8 °C) 97.6 °F (36.4 °C)   TempSrc: Oral  Oral Oral   SpO2: 98%  91% 99%   Weight: 203 lb 7.8 oz (92.3 kg)      Height:         24HR INTAKE/OUTPUT:      Intake/Output Summary (Last 24 hours) at 6/7/2022 1627  Last data filed at 6/7/2022 1406  Gross per 24 hour   Intake 775 ml   Output 1245 ml   Net -470 ml     Patient Vitals for the past 96 hrs (Last 3 readings):   Weight   06/07/22 0000 203 lb 7.8 oz (92.3 kg)   06/06/22 0515 198 lb 6.6 oz (90 kg)       Constitutional:  Alert, awake, no apparent distress  Cardiovascular:  S1, S2 without m/r/g  Respiratory:  CTA B without w/r/r  Abdomen: +bs, soft, nt  Ext: nil LE edema. Data/  Recent Labs     06/05/22  0344 06/05/22  1133 06/06/22  0502 06/06/22  1443 06/06/22  1918 06/07/22  0358 06/07/22  1135   WBC 4.4  --  3.7  --   --  4.4  --    HGB 6.9*   < > 6.9*   < > 8.5* 8.3* 8.2*   HCT 22.5*   < > 21.9*   < > 27.2* 26.3* 26.6*   MCV 91.3  --  88.9  --   --  90.7  --    *  --  117*  --   --  135*  --     < > = values in this interval not displayed. Recent Labs     06/05/22  0344 06/05/22  0918 06/05/22  1749 06/06/22  0502 06/07/22  0358   *   < > 130* 132* 133*   K 3.9   < > 4.4 4.1 4.4   CL 96*   < > 97* 102 104   CO2 23   < > 24 22 23   GLUCOSE 105*  --   --  94 105*   BUN 8  --   --  6* 5*   CREATININE 0.72  --   --  0.69* 0.59*   LABGLOM >60  --   --  >60 >60   GFRAA >60  --   --  >60 >60    < > = values in this interval not displayed. Assessment/   1. Acute on chronic hypotonic hyponatremia with recent worsening to  119 mmol/l. Urine sodium is less than 20 and urine osmolality 119, likely secondary to decreased effective arterial volume and liver disease + decrease in solute intake  SIADH is a differential with the use of Prozac but not supported by low urine sodium and urine osmolarity. Serum sodium gradually improving      2. Chronic liver disease complicated by cirrhosis and ascites[abdominal paracentesis 1 month ago with removal of 11 L]     3.  Acute kidney injury secondary to prerenal component from diuretic-resolved     4. Acute anemia-rule out blood loss-Hb 7.0 gm/dl    5. Hypotension  Plan/   IV fluids with 0.9 saline at 50 mils per hour.   Serum sodium can be checked every 12 hrly  Monitor strict urine output  Midodrine 5 mg 3 times daily for systolic less than 90  Oral Fluid restriction of 1500 mls/day        Pradeep Love MD

## 2022-06-07 NOTE — PROGRESS NOTES
want to directly band this area as it will slough off and we will not be able to obtain any tissue diagnosis. We opted to band the varices distal and proximal to this area. Adequate decompression of varices was noted.      Stomach: The stomach was inspected in both forward and retroflex fashion and was appropriately distensible. The cardia, fundus, incisura, antrum and pylorus were identified via direct visualization. The endoscopic exam showed mild to moderate portal hypertensive gastropathy in proximal stomach.      Duodenum: The proximal small bowel was inspected through the bulb, sweep, and second portion of the duodenum. The endoscopic exam showed no pathology. ASSESSMENT/plan  1. Anemia with melena s/p egd with banding yesterday; a inflammatory polyp was  Seen at 30 cm, no melena overnight  -soft diet  -will need repeat egd this week to reassess the polyp due to the hx of esophageal cancer  -ppi bid iv  -continue sandostatin drip, the pt is refusing to have another iv started for a ppi drip  -liquid carafate x2 weeks  -trend hh  paracentesis ordered  -no ng tubes  -avoid sedatives and narcotics    2.cirrhosis secondary to alcohol abuse  -continue abx  -2gm low salt diet    3.hx esophageal cancer          This plan was formulated in collaboration with Dr. Chi Langford  .     Electronically signed by: MASSIMO Champion - CNP on 6/7/2022 at 7:40 AM

## 2022-06-07 NOTE — PROGRESS NOTES
[address]    PROGRESS NOTE             6/7/2022    12:08 PM    Name:   Richard Goff  MRN:     [id number]     Acct:      [de-identified]   Room:   2121/2121-01   Day:  5  Admit Date:  6/2/2022 10:24 AM    PCP:  VASU Lechuga  Code Status:  Full Code    Subjective:     C/C:   Chief Complaint   Patient presents with    Abnormal Lab     Interval History Status:     6/7/22    S/p egd      HISTORY: Mr. Richard Goff is a 57 y.o. male who presents to the [place] [address] Endoscopy unit for upper endoscopy. The patient's clinical history is remarkable for melena in setting of cirrhosis. History of esophageal cancer status post resection. He is currently medically stable and appropriate for the planned procedure. PREOPERATIVE DIAGNOSIS: Melena in setting of cirrhosis. History of esophageal cancer status post resection. PROCEDURES:   1) Transoral Upper Endoscopy, esophageal band ligation. POSTOPERATIVE DIAGNOSIS:   F2 esophageal varices  Inflammatory polyp in distal esophagus  Evidence of prior distal esophagectomy  Mild to moderate portal hypertensive gastropathy          Patient seen examined at bedside. No acute events reported overnight. Patient's vitals within normal limits. Patient sodium trending up, this morning it was at 128. Patient is on sodium chloride IV running at 50 mill per hour. Patient's hemoglobin 6.9 this morning. Repeat FOBT positive. Patient is on nadolol, acute right, and Protonix IV 40 mg twice daily. Per GI note, possible EGD on Monday. Patient has no complaints this morning. Patient denies chest pain, shortness of breath, dizziness or lightheadedness.     Brief History:   Patient is a 79-year-old male with history of cirrhosis secondary to hep C, scented to the ED today due to abnormal lab results.  Patient was seen by GI yesterday and had lab work ordered which came back this morning and showed hyponatremia of 119.  Patient is complaining of mild symptoms such as nausea and fatigue but otherwise is asymptomatic.  Patient denies chest pain, shortness of breath, dizziness, lightheadedness, shortness of breath and vomiting.  Patient denies any blood in his stool or any changes in urination. Electrolyte panel done in the ED showed sodium of 119.  CBC unremarkable.  Patient's serum osmolarity is decreased at 259. Pressure in the ED 96/51. Nephrology was consulted in the ED.     Patient is a 48-year-old male with history of cirrhosis secondary to hep C, scented to the ED today due to abnormal lab results. Ramana Matta was seen by GI yesterday and had lab work ordered which came back this morning and showed hyponatremia of 119.  Patient is complaining of mild symptoms such as nausea and fatigue but otherwise is asymptomatic.  Patient denies chest pain, shortness of breath, dizziness, lightheadedness, shortness of breath and vomiting.  Patient denies any blood in his stool or any changes in urination. Electrolyte panel done in the ED showed sodium of 119.  CBC unremarkable.  Patient's serum osmolarity is decreased at 259. Pressure in the ED 96/51. Nephrology was consulted in the ED. Review of Systems:     Review of Systems   Constitutional: Negative for chills and fever. Respiratory: Negative for shortness of breath. Cardiovascular: Negative for chest pain. Gastrointestinal: Negative for abdominal pain, constipation, diarrhea, nausea and vomiting. Genitourinary: Negative for dysuria. Neurological: Negative for dizziness, light-headedness, numbness and headaches. Medications:      Allergies:  No Known Allergies    Current Meds:   Scheduled Meds:    albumin human  25 g IntraVENous Once    sodium chloride flush  5-40 mL IntraVENous 2 times per day    sucralfate  1 g Oral 4 times per day    cefTRIAXone (ROCEPHIN) IV  1,000 mg IntraVENous Q24H    pantoprazole (PROTONIX) 40 mg injection  40 mg IntraVENous Q12H    iron sucrose  200 mg IntraVENous Q24H    atorvastatin  20 mg Oral Nightly    [Held by provider] furosemide  20 mg Oral BID    lactulose  10 g Oral TID    nadolol  20 mg Oral Daily    [Held by provider] spironolactone  50 mg Oral QPM    sodium chloride flush  5-40 mL IntraVENous 2 times per day    [Held by provider] enoxaparin  40 mg SubCUTAneous Daily     Continuous Infusions:    sodium chloride      sodium chloride      sodium chloride      sodium chloride 50 mL/hr at 06/06/22 0025    octreotide (SandoSTATIN) infusion 50 mcg/hr (06/07/22 0857)    sodium chloride       PRN Meds: sodium chloride, sodium chloride, sodium chloride flush, sodium chloride, meperidine, fentanNYL, fentanNYL, midodrine, sodium chloride flush, sodium chloride, ondansetron **OR** ondansetron, polyethylene glycol    Data:     Past Medical History:   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 Anxiety, Arthritis, Back pain, chronic, Lezama esophagus, BPH (benign prostatic hypertrophy), Cholelithiasis, Cirrhosis ([facility] COVID-19, COVID-19 vaccine series completed, DDD (degenerative disc disease), lumbar, Depression, Esophageal cancer ([facility] Esophageal varices ([facility] Fatty liver, GERD (gastroesophageal reflux disease), Hep C w/o coma, chronic ([facility] History of alcohol abuse, History of blood transfusion, History of colon polyps, History of tobacco abuse, [place] (hard of hearing), Hyperlipidemia, Hypertension, Port-A-Cath in place, Sciatica, Spinal stenosis, Stomach ulcer, Tubular adenoma of colon, Vitamin D deficiency, and Wears glasses. Social History:   reports that he quit smoking about 5 years ago. He has a 45.00 pack-year smoking history. He has never used smokeless tobacco. He reports previous alcohol use. He reports previous drug use. Frequency: 1.00 time per week.      Family History:   Family History   Problem Relation Age of Onset    Cancer Mother pancreatic    Cancer Father         bone    Diabetes Sister     Asthma Brother        Vitals:  /71   Pulse 73   Temp 98.2 °F (36.8 °C) (Oral)   Resp 18   Ht 5' 10\" (1.778 m)   Wt 203 lb 7.8 oz (92.3 kg)   SpO2 91%   BMI 29.20 kg/m²   Temp (24hrs), Av °F (36.7 °C), Min:97.5 °F (36.4 °C), Max:98.6 °F (37 °C)    No results for input(s): POCGLU in the last 72 hours. I/O(24Hr): Intake/Output Summary (Last 24 hours) at 2022 1208  Last data filed at 2022 1023  Gross per 24 hour   Intake 1826.33 ml   Output 1245 ml   Net 581.33 ml       Labs:  [unfilled]    Lab Results   Component Value Date/Time    SPECIAL NOT REPORTED 2022 11:28 AM     Lab Results   Component Value Date/Time    CULTURE NO GROWTH 6 DAYS 2022 09:00 AM       [facility]    Radiology:    No results found. Physical Examination:        Physical Exam  Constitutional:       Appearance: Normal appearance. HENT:      Head: Normocephalic and atraumatic. Cardiovascular:      Rate and Rhythm: Normal rate and regular rhythm. Pulses: Normal pulses. Heart sounds: Normal heart sounds. No murmur heard. No friction rub. No gallop. Pulmonary:      Effort: Pulmonary effort is normal. No respiratory distress. Breath sounds: Normal breath sounds. No stridor. No wheezing, rhonchi or rales. Abdominal:      General: Abdomen is flat. There is no distension. Palpations: Abdomen is soft. There is no mass. Tenderness: There is no abdominal tenderness. There is no guarding. Hernia: No hernia is present. Neurological:      Mental Status: He is alert.            Assessment:        Primary Problem  Hyponatremia    Active Hospital Problems    Diagnosis Date Noted    Drop in hemoglobin [R71.0] 2022     Priority: Medium    Portal hypertension ([address] [K76.6]      Priority: Medium    Thrombocytopenia (HCC) [D69.6] 2020    History of hepatitis C [Z86.19] 2017    Hepatic cirrhosis ([facility] [K74.60] 09/15/2016    Cirrhosis ([facility] [K74.60]     Chronic viral hepatitis B without delta agent and without coma ([place] [address].) [B18.1] 07/22/2016       Plan:        1. Hyponatremia  -Sodium level in the , this morning 128  -Nephrology consulted              -Started normal saline 50 cc/h to avoid fast correction                         -Monitor strict urine output              -Fluid restriction of 1500 mils per day              -Lasix 20 mg twice daily resumed     2.  Drop in hemoglobin secondary to cirrhosis versus GI bleed  -Hemoglobin this morning 6.9  -Patient's status post 1 unit of PRBCs on second day of admission  -Initial FOBT negative, repeat FOBT positive  -Lovenox held  -GI consulted              -Added octreotide for possible bleed secondary to portal hypertension              -Added Protonix 40 mg IV twice daily              -Plan for EGD on monday              -Continue nadolol and lactulose     3.  Hypotension  -Midodrine 5 mg 3 times as needed              -Hold if systolic blood pressure less than 110       6/6/22    · Hb 6.9 today . Was 7.2 yesterday  · receivinga unit PRBC   ·  egd today  1. Hyponatremia corrected . 2. GI input     Patient has had an drop of hemoglobin etiology not clear. As he is known to have significant portal hypertensive gastropathy, it is possible that he last bled secondary to portal hypertension. We will keep him on liquid diet. We will start octreotide. If he continues to significant issues managing ascites and a stabilizing hemoglobin, may need TIPS procedure. Discussed with the nursing staff. Late patient in the morning.    3.        Principal Problem (Resolved):     Hyponatremia  Active Problems:    Drop in hemoglobin    Portal hypertension (HCC)    Chronic viral hepatitis B without delta agent and without coma (HCC)    Cirrhosis (HCC)    Hepatic cirrhosis (HCC)    History of hepatitis C    Thrombocytopenia (HCC)      6/7/22    Hb 8.2   Feeling better   · f2 varyces 4. Transfer to med surg  5. Need repeat egd in a few days   6.  On octreotide drip and protonix            Mounika Urbina MD  6/7/2022  12:08 PM

## 2022-06-07 NOTE — CARE COORDINATION
ONGOING DISCHARGE PLAN:    Patient is alert and oriented x4. Spoke with patient regarding discharge plan and patient confirms that plan is still to discharge to home with [place]   Patient ok to transfer to [facility] surg  Will need a repeat EGD in a few days  On a octreotide drip and protonix   hbg 8.2  Increase to a soft diet     Will continue to follow for additional discharge needs.     Electronically signed by Mariana Cheadle, RN on 6/7/2022 at 12:17 PM

## 2022-06-07 NOTE — PLAN OF CARE
Problem: Safety - Adult  Goal: Free from fall injury  6/7/2022 0252 by Army Omar RN  Outcome: Progressing     Problem: ABCDS Injury Assessment  Goal: Absence of physical injury  6/7/2022 0252 by Army Omar RN  Outcome: Progressing     Problem: Pain  Goal: Verbalizes/displays adequate comfort level or baseline comfort level  6/7/2022 0252 by Army Omar RN  Outcome: Progressing   Patient had no complaints of pain this shift.

## 2022-06-08 ENCOUNTER — APPOINTMENT (OUTPATIENT)
Dept: INTERVENTIONAL RADIOLOGY/VASCULAR | Age: 62
DRG: 641 | End: 2022-06-08
Payer: MEDICARE

## 2022-06-08 LAB
ABSOLUTE EOS #: 0.04 K/UL (ref 0–0.4)
ABSOLUTE LYMPH #: 0.4 K/UL (ref 1–4.8)
ABSOLUTE MONO #: 0.26 K/UL (ref 0.1–1.3)
ALBUMIN FLUID: 0.8 G/DL
ALBUMIN SERPL-MCNC: 2.5 G/DL (ref 3.5–5.2)
ALP BLD-CCNC: 86 U/L (ref 40–129)
ALT SERPL-CCNC: 7 U/L (ref 5–41)
ANION GAP SERPL CALCULATED.3IONS-SCNC: 7 MMOL/L (ref 9–17)
AST SERPL-CCNC: 18 U/L
BASOPHILS # BLD: 0 % (ref 0–2)
BASOPHILS ABSOLUTE: 0 K/UL (ref 0–0.2)
BILIRUB SERPL-MCNC: 1.91 MG/DL (ref 0.3–1.2)
BUN BLDV-MCNC: 3 MG/DL (ref 8–23)
CALCIUM SERPL-MCNC: 7.9 MG/DL (ref 8.6–10.4)
CHLORIDE BLD-SCNC: 104 MMOL/L (ref 98–107)
CO2: 21 MMOL/L (ref 20–31)
CREAT SERPL-MCNC: 0.58 MG/DL (ref 0.7–1.2)
EOSINOPHILS RELATIVE PERCENT: 1 % (ref 0–4)
GFR AFRICAN AMERICAN: >60 ML/MIN
GFR NON-AFRICAN AMERICAN: >60 ML/MIN
GFR SERPL CREATININE-BSD FRML MDRD: ABNORMAL ML/MIN/{1.73_M2}
GLUCOSE BLD-MCNC: 95 MG/DL (ref 70–99)
HCT VFR BLD CALC: 24.9 % (ref 41–53)
HCT VFR BLD CALC: 26.2 % (ref 41–53)
HCT VFR BLD CALC: 26.7 % (ref 41–53)
HEMOGLOBIN: 7.4 G/DL (ref 13.5–17.5)
HEMOGLOBIN: 8 G/DL (ref 13.5–17.5)
HEMOGLOBIN: 8.1 G/DL (ref 13.5–17.5)
LYMPHOCYTES # BLD: 9 % (ref 24–44)
MCH RBC QN AUTO: 27.9 PG (ref 26–34)
MCHC RBC AUTO-ENTMCNC: 30.4 G/DL (ref 31–37)
MCV RBC AUTO: 91.9 FL (ref 80–100)
MONOCYTES # BLD: 6 % (ref 1–7)
MORPHOLOGY: ABNORMAL
PDW BLD-RTO: 15.8 % (ref 11.5–14.9)
PLATELET # BLD: 135 K/UL (ref 150–450)
PMV BLD AUTO: 7 FL (ref 6–12)
POTASSIUM SERPL-SCNC: 4 MMOL/L (ref 3.7–5.3)
RBC # BLD: 2.91 M/UL (ref 4.5–5.9)
SEG NEUTROPHILS: 84 % (ref 36–66)
SEGMENTED NEUTROPHILS ABSOLUTE COUNT: 3.7 K/UL (ref 1.3–9.1)
SODIUM BLD-SCNC: 132 MMOL/L (ref 135–144)
SPECIMEN TYPE: NORMAL
SPECIMEN TYPE: NORMAL
TOTAL PROTEIN, BODY FLUID: 1.2 G/DL
TOTAL PROTEIN: 4.5 G/DL (ref 6.4–8.3)
WBC # BLD: 4.4 K/UL (ref 3.5–11)

## 2022-06-08 PROCEDURE — 6360000002 HC RX W HCPCS: Performed by: NURSE PRACTITIONER

## 2022-06-08 PROCEDURE — 6360000002 HC RX W HCPCS: Performed by: RADIOLOGY

## 2022-06-08 PROCEDURE — 6360000002 HC RX W HCPCS: Performed by: INTERNAL MEDICINE

## 2022-06-08 PROCEDURE — 85018 HEMOGLOBIN: CPT

## 2022-06-08 PROCEDURE — 2580000003 HC RX 258: Performed by: INTERNAL MEDICINE

## 2022-06-08 PROCEDURE — 99233 SBSQ HOSP IP/OBS HIGH 50: CPT | Performed by: INTERNAL MEDICINE

## 2022-06-08 PROCEDURE — 89051 BODY FLUID CELL COUNT: CPT

## 2022-06-08 PROCEDURE — 2580000003 HC RX 258: Performed by: NURSE PRACTITIONER

## 2022-06-08 PROCEDURE — 2580000003 HC RX 258: Performed by: ANESTHESIOLOGY

## 2022-06-08 PROCEDURE — 80053 COMPREHEN METABOLIC PANEL: CPT

## 2022-06-08 PROCEDURE — C9113 INJ PANTOPRAZOLE SODIUM, VIA: HCPCS | Performed by: INTERNAL MEDICINE

## 2022-06-08 PROCEDURE — 87070 CULTURE OTHR SPECIMN AEROBIC: CPT

## 2022-06-08 PROCEDURE — 6370000000 HC RX 637 (ALT 250 FOR IP): Performed by: INTERNAL MEDICINE

## 2022-06-08 PROCEDURE — 88112 CYTOPATH CELL ENHANCE TECH: CPT

## 2022-06-08 PROCEDURE — 49083 ABD PARACENTESIS W/IMAGING: CPT

## 2022-06-08 PROCEDURE — 2709999900 IR US GUIDED PARACENTESIS

## 2022-06-08 PROCEDURE — 84157 ASSAY OF PROTEIN OTHER: CPT

## 2022-06-08 PROCEDURE — 85025 COMPLETE CBC W/AUTO DIFF WBC: CPT

## 2022-06-08 PROCEDURE — 88305 TISSUE EXAM BY PATHOLOGIST: CPT

## 2022-06-08 PROCEDURE — A4216 STERILE WATER/SALINE, 10 ML: HCPCS | Performed by: INTERNAL MEDICINE

## 2022-06-08 PROCEDURE — 99232 SBSQ HOSP IP/OBS MODERATE 35: CPT | Performed by: INTERNAL MEDICINE

## 2022-06-08 PROCEDURE — APPSS30 APP SPLIT SHARED TIME 16-30 MINUTES: Performed by: NURSE PRACTITIONER

## 2022-06-08 PROCEDURE — 36415 COLL VENOUS BLD VENIPUNCTURE: CPT

## 2022-06-08 PROCEDURE — 2060000000 HC ICU INTERMEDIATE R&B

## 2022-06-08 PROCEDURE — 87205 SMEAR GRAM STAIN: CPT

## 2022-06-08 PROCEDURE — 82042 OTHER SOURCE ALBUMIN QUAN EA: CPT

## 2022-06-08 PROCEDURE — 85014 HEMATOCRIT: CPT

## 2022-06-08 PROCEDURE — P9047 ALBUMIN (HUMAN), 25%, 50ML: HCPCS | Performed by: RADIOLOGY

## 2022-06-08 PROCEDURE — 0W9G3ZZ DRAINAGE OF PERITONEAL CAVITY, PERCUTANEOUS APPROACH: ICD-10-PCS | Performed by: NURSE PRACTITIONER

## 2022-06-08 PROCEDURE — 88342 IMHCHEM/IMCYTCHM 1ST ANTB: CPT

## 2022-06-08 PROCEDURE — 87075 CULTR BACTERIA EXCEPT BLOOD: CPT

## 2022-06-08 RX ORDER — ALBUMIN (HUMAN) 12.5 G/50ML
75 SOLUTION INTRAVENOUS ONCE
Status: COMPLETED | OUTPATIENT
Start: 2022-06-08 | End: 2022-06-08

## 2022-06-08 RX ADMIN — MIDODRINE HYDROCHLORIDE 5 MG: 5 TABLET ORAL at 09:49

## 2022-06-08 RX ADMIN — SODIUM CHLORIDE 40 MG: 9 INJECTION INTRAMUSCULAR; INTRAVENOUS; SUBCUTANEOUS at 06:25

## 2022-06-08 RX ADMIN — LACTULOSE 10 G: 20 SOLUTION ORAL at 09:46

## 2022-06-08 RX ADMIN — ATORVASTATIN CALCIUM 20 MG: 20 TABLET, FILM COATED ORAL at 21:38

## 2022-06-08 RX ADMIN — CEFTRIAXONE SODIUM 1000 MG: 1 INJECTION, POWDER, FOR SOLUTION INTRAMUSCULAR; INTRAVENOUS at 15:58

## 2022-06-08 RX ADMIN — SUCRALFATE 1 G: 1 TABLET ORAL at 05:18

## 2022-06-08 RX ADMIN — SUCRALFATE 1 G: 1 TABLET ORAL at 11:58

## 2022-06-08 RX ADMIN — OCTREOTIDE ACETATE 50 MCG/HR: 500 INJECTION, SOLUTION INTRAVENOUS; SUBCUTANEOUS at 06:24

## 2022-06-08 RX ADMIN — SODIUM CHLORIDE, PRESERVATIVE FREE 10 ML: 5 INJECTION INTRAVENOUS at 21:39

## 2022-06-08 RX ADMIN — LACTULOSE 10 G: 20 SOLUTION ORAL at 13:18

## 2022-06-08 RX ADMIN — ALBUMIN (HUMAN) 75 G: 0.25 INJECTION, SOLUTION INTRAVENOUS at 09:49

## 2022-06-08 RX ADMIN — IRON SUCROSE 200 MG: 20 INJECTION, SOLUTION INTRAVENOUS at 13:11

## 2022-06-08 RX ADMIN — SUCRALFATE 1 G: 1 TABLET ORAL at 18:10

## 2022-06-08 RX ADMIN — LACTULOSE 10 G: 20 SOLUTION ORAL at 21:38

## 2022-06-08 RX ADMIN — SODIUM CHLORIDE, PRESERVATIVE FREE 10 ML: 5 INJECTION INTRAVENOUS at 21:31

## 2022-06-08 RX ADMIN — OCTREOTIDE ACETATE 50 MCG/HR: 500 INJECTION, SOLUTION INTRAVENOUS; SUBCUTANEOUS at 21:35

## 2022-06-08 RX ADMIN — SODIUM CHLORIDE 40 MG: 9 INJECTION INTRAMUSCULAR; INTRAVENOUS; SUBCUTANEOUS at 18:10

## 2022-06-08 RX ADMIN — MIDODRINE HYDROCHLORIDE 5 MG: 5 TABLET ORAL at 18:11

## 2022-06-08 ASSESSMENT — ENCOUNTER SYMPTOMS
CONSTIPATION: 0
SHORTNESS OF BREATH: 0
VOMITING: 0
NAUSEA: 0
ABDOMINAL PAIN: 0
DIARRHEA: 0

## 2022-06-08 ASSESSMENT — PAIN SCALES - GENERAL: PAINLEVEL_OUTOF10: 2

## 2022-06-08 NOTE — PROGRESS NOTES
[address]    PROGRESS NOTE             6/8/2022    2:14 PM    Name:   Subha Valdivia  MRN:     [id number]     Acct:      [de-identified]   Room:   2121/2121-01   Day:  6  Admit Date:  6/2/2022 10:24 AM    PCP:  VASU Abrams Do  Code Status:  Full Code    Subjective:     C/C:   Chief Complaint   Patient presents with    Abnormal Lab     Interval History Status:     6/7/22    S/p egd      HISTORY: Mr. Subha Valdivia is a 57 y.o. male who presents to the [place] [address] Endoscopy unit for upper endoscopy. The patient's clinical history is remarkable for melena in setting of cirrhosis. History of esophageal cancer status post resection. He is currently medically stable and appropriate for the planned procedure. PREOPERATIVE DIAGNOSIS: Melena in setting of cirrhosis. History of esophageal cancer status post resection. PROCEDURES:   1) Transoral Upper Endoscopy, esophageal band ligation. POSTOPERATIVE DIAGNOSIS:   F2 esophageal varices  Inflammatory polyp in distal esophagus  Evidence of prior distal esophagectomy  Mild to moderate portal hypertensive gastropathy          Patient seen examined at bedside. No acute events reported overnight. Patient's vitals within normal limits. Patient sodium trending up, this morning it was at 128. Patient is on sodium chloride IV running at 50 mill per hour. Patient's hemoglobin 6.9 this morning. Repeat FOBT positive. Patient is on nadolol, acute right, and Protonix IV 40 mg twice daily. Per GI note, possible EGD on Monday. Patient has no complaints this morning. Patient denies chest pain, shortness of breath, dizziness or lightheadedness.     Brief History:   Patient is a 23-year-old male with history of cirrhosis secondary to hep C, scented to the ED today due to abnormal lab results.  Patient was seen by GI yesterday and had lab work ordered which came back this morning and showed hyponatremia of 119.  Patient is complaining of mild symptoms such as nausea and fatigue but otherwise is asymptomatic.  Patient denies chest pain, shortness of breath, dizziness, lightheadedness, shortness of breath and vomiting.  Patient denies any blood in his stool or any changes in urination. Electrolyte panel done in the ED showed sodium of 119.  CBC unremarkable.  Patient's serum osmolarity is decreased at 259. Pressure in the ED 96/51. Nephrology was consulted in the ED.     Patient is a 69-year-old male with history of cirrhosis secondary to hep C, scented to the ED today due to abnormal lab results. Kamlesh Vincent was seen by GI yesterday and had lab work ordered which came back this morning and showed hyponatremia of 119.  Patient is complaining of mild symptoms such as nausea and fatigue but otherwise is asymptomatic.  Patient denies chest pain, shortness of breath, dizziness, lightheadedness, shortness of breath and vomiting.  Patient denies any blood in his stool or any changes in urination. Electrolyte panel done in the ED showed sodium of 119.  CBC unremarkable.  Patient's serum osmolarity is decreased at 259. Pressure in the ED 96/51. Nephrology was consulted in the ED. Review of Systems:     Review of Systems   Constitutional: Negative for chills and fever. Respiratory: Negative for shortness of breath. Cardiovascular: Negative for chest pain. Gastrointestinal: Negative for abdominal pain, constipation, diarrhea, nausea and vomiting. Genitourinary: Negative for dysuria. Neurological: Negative for dizziness, light-headedness, numbness and headaches. Medications:      Allergies:  No Known Allergies    Current Meds:   Scheduled Meds:    sodium chloride flush  5-40 mL IntraVENous 2 times per day    sucralfate  1 g Oral 4 times per day    cefTRIAXone (ROCEPHIN) IV  1,000 mg IntraVENous Q24H    pantoprazole (PROTONIX) 40 mg injection  40 mg IntraVENous Q12H    iron sucrose  200 mg IntraVENous Q24H    atorvastatin  20 mg Oral Nightly    [Held by provider] furosemide  20 mg Oral BID    lactulose  10 g Oral TID    nadolol  20 mg Oral Daily    [Held by provider] spironolactone  50 mg Oral QPM    sodium chloride flush  5-40 mL IntraVENous 2 times per day    [Held by provider] enoxaparin  40 mg SubCUTAneous Daily     Continuous Infusions:    sodium chloride      sodium chloride      sodium chloride      sodium chloride 50 mL/hr at 06/07/22 1725    octreotide (SandoSTATIN) infusion 50 mcg/hr (06/08/22 0624)    sodium chloride       PRN Meds: sodium chloride, sodium chloride, sodium chloride flush, sodium chloride, meperidine, fentanNYL, fentanNYL, midodrine, sodium chloride flush, sodium chloride, ondansetron **OR** ondansetron, polyethylene glycol    Data:     Past Medical History:   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 ([facility] Anxiety, Arthritis, Back pain, chronic, Lezama esophagus, BPH (benign prostatic hypertrophy), Cholelithiasis, Cirrhosis ([facility] COVID-19, COVID-19 vaccine series completed, DDD (degenerative disc disease), lumbar, Depression, Esophageal cancer ([facility] Esophageal varices ([facility] Fatty liver, GERD (gastroesophageal reflux disease), Hep C w/o coma, chronic ([facility] History of alcohol abuse, History of blood transfusion, History of colon polyps, History of tobacco abuse, [place] (hard of hearing), Hyperlipidemia, Hypertension, Port-A-Cath in place, Sciatica, Spinal stenosis, Stomach ulcer, Tubular adenoma of colon, Vitamin D deficiency, and Wears glasses. Social History:   reports that he quit smoking about 5 years ago. He has a 45.00 pack-year smoking history. He has never used smokeless tobacco. He reports previous alcohol use. He reports previous drug use. Frequency: 1.00 time per week.      Family History:   Family History   Problem Relation Age of Onset    Cancer Mother         pancreatic    Cancer Father         bone    Diabetes Sister     Asthma Brother        Vitals:  BP (!) 102/47   Pulse 68   Temp 98 °F (36.7 °C) (Oral)   Resp 19   Ht 5' 10\" (1.778 m)   Wt 209 lb 7 oz (95 kg)   SpO2 96%   BMI 30.05 kg/m²   Temp (24hrs), Av.1 °F (36.7 °C), Min:98 °F (36.7 °C), Max:98.2 °F (36.8 °C)    No results for input(s): POCGLU in the last 72 hours. I/O(24Hr): Intake/Output Summary (Last 24 hours) at 2022 1414  Last data filed at 2022 1348  Gross per 24 hour   Intake 1260 ml   Output 77079 ml   Net -62595 ml       Labs:  [unfilled]    Lab Results   Component Value Date/Time    SPECIAL NOT REPORTED 2022 11:28 AM     Lab Results   Component Value Date/Time    CULTURE NO GROWTH 6 DAYS 2022 09:00 AM       [facility]    Radiology:    No results found. Physical Examination:        Physical Exam  Constitutional:       Appearance: Normal appearance. HENT:      Head: Normocephalic and atraumatic. Cardiovascular:      Rate and Rhythm: Normal rate and regular rhythm. Pulses: Normal pulses. Heart sounds: Normal heart sounds. No murmur heard. No friction rub. No gallop. Pulmonary:      Effort: Pulmonary effort is normal. No respiratory distress. Breath sounds: Normal breath sounds. No stridor. No wheezing, rhonchi or rales. Abdominal:      General: Abdomen is flat. There is no distension. Palpations: Abdomen is soft. There is no mass. Tenderness: There is no abdominal tenderness. There is no guarding. Hernia: No hernia is present. Neurological:      Mental Status: He is alert.            Assessment:        Primary Problem  Hyponatremia    Active Hospital Problems    Diagnosis Date Noted    [facility] ([facility] [I85.10] 2022     Priority: Medium    Esophageal polyp [K22.81] 2022     Priority: Medium    Drop in hemoglobin [R71.0] 2022     Priority: Medium    Portal hypertension ([facility] [K76.6]      Priority: Medium    Thrombocytopenia ([facility] [D69.6] 12/23/2020    History of hepatitis C [Z86.19] 09/11/2017    Hepatic cirrhosis ([facility] [K74.60] 09/15/2016    Cirrhosis ([place] [address] [K74.60]     Chronic viral hepatitis B without delta agent and without coma ([place] [address] [B18.1] 07/22/2016       Plan:        1. Hyponatremia  -Sodium level in the , this morning 128  -Nephrology consulted              -Started normal saline 50 cc/h to avoid fast correction                         -Monitor strict urine output              -Fluid restriction of 1500 mils per day              -Lasix 20 mg twice daily resumed     2.  Drop in hemoglobin secondary to cirrhosis versus GI bleed  -Hemoglobin this morning 6.9  -Patient's status post 1 unit of PRBCs on second day of admission  -Initial FOBT negative, repeat FOBT positive  -Lovenox held  -GI consulted              -Added octreotide for possible bleed secondary to portal hypertension              -Added Protonix 40 mg IV twice daily              -Plan for EGD on monday              -Continue nadolol and lactulose     3.  Hypotension  -Midodrine 5 mg 3 times as needed              -Hold if systolic blood pressure less than 110       6/6/22    · Hb 6.9 today . Was 7.2 yesterday  · receivinga unit PRBC   ·  egd today  1. Hyponatremia corrected . 2. GI input     Patient has had an drop of hemoglobin etiology not clear. As he is known to have significant portal hypertensive gastropathy, it is possible that he last bled secondary to portal hypertension. We will keep him on liquid diet. We will start octreotide. If he continues to significant issues managing ascites and a stabilizing hemoglobin, may need TIPS procedure. Discussed with the nursing staff. Late patient in the morning.    3.        Principal Problem (Resolved):     Hyponatremia  Active Problems:    Drop in hemoglobin    Portal hypertension (HCC)    Secondary esophageal varices (HCC)    Esophageal polyp    Chronic viral hepatitis B without delta agent and without coma ([facility]    Cirrhosis ([facility]    Hepatic cirrhosis ([facility]    History of hepatitis C    Thrombocytopenia ([facility]      6/7/22    Hb 8.2   Feeling better   · f2 varyces 4. Transfer to med surg  5. Need repeat egd in a few days   6. On octreotide drip and protonix            6/8/22    · Sodium has improved to 132 7. Globin is 7.4  8. EGD planned for tomorrow  9.  Had large volume paracentesis paracentesis  10. 11.8 L taken out          Jackie Nathan MD  6/8/2022  2:14 PM

## 2022-06-08 NOTE — PLAN OF CARE
Problem: Safety - Adult  Goal: Free from fall injury  6/8/2022 0329 by Shane Moreno RN  Outcome: Progressing     No falls noted this shift. Patient ambulates with x1 staff assistance. Bed kept in low position. Safe environment maintained. Bedside table & call light in reach. Uses call light appropriately when needing assistance.       Problem: Pain  Goal: Verbalizes/displays adequate comfort level or baseline comfort level  6/8/2022 0329 by Shane Moreno RN  Outcome: Progressing     Problem: Chronic Conditions and Co-morbidities  Goal: Patient's chronic conditions and co-morbidity symptoms are monitored and maintained or improved  6/8/2022 0329 by Shane Moreno RN  Outcome: Progressing     Problem: Discharge Planning  Goal: Discharge to home or other facility with appropriate resources  6/8/2022 0329 by Shane Moreno RN  Outcome: Progressing

## 2022-06-08 NOTE — PROGRESS NOTES
Patient tolerated paracnetesis without distress. 29599 ml of clear yellow fluid removed. Dry dressing to site. Patient returned to room. Nurse updated.

## 2022-06-08 NOTE — PROGRESS NOTES
Nephrology Progress Note    Subjective/   57y.o. year old male who we are seeing in consultation for hyponatremia. Interval history:  Patient seen and examined, no new complaints. Serum sodium improved kidney function within normal limits  Status post EGD,, variceal banding by GI   Patient had paracentesis done this morning with 11.8 L of fluid      History of Present Illness: This is a 57 y.o. male with history of alcoholic liver disease, cirrhosis complicated by portal hypertension and ascites, history of esophageal cancer 1 year ago status post chemoradiation therapy. He presented to the ER of [facility] with fatigue and nausea over the last 1 week. He indicated poor appetite, dull  abdominal ache, muscle weakness and unsteady gait. He denied any headaches or seizures. He was found on laboratory studies to have a serum sodium of 119 mmol/L[baseline serum sodium  128 to 132 mmol/L]. He has been keeping to a fluid restriction and is on Aldactone 100 mg 3 times daily and Lasix 40 mg 3 times daily. Denies any use of thiazide diuretics. Home medication includes Prozac-SSRI. He denies any decrease in urine output. This morning he was noted to hypotensive with hemoglobin of  5.9 gm/dl. He currently is on 0.9 saline at 75 cc/h and serum sodium has been decreased to 128 mmol/L.   Denies any rectal bleeding or melena.     Objective/     Vitals:    06/08/22 0846 06/08/22 0902 06/08/22 1245 06/08/22 1417   BP: 109/63 101/64 (!) 102/47    Pulse: 67 64 68    Resp: 18 18 19    Temp:  98 °F (36.7 °C) 98 °F (36.7 °C)    TempSrc:   Oral    SpO2:  99% 96%    Weight:       Height:    5' 10\" (1.778 m)     24HR INTAKE/OUTPUT:      Intake/Output Summary (Last 24 hours) at 6/8/2022 1715  Last data filed at 6/8/2022 1348  Gross per 24 hour   Intake 1260 ml   Output 20998 ml   Net -88963 ml     Patient Vitals for the past 96 hrs (Last 3 readings):   Weight   06/08/22 0100 209 lb 7 oz (95 kg)   06/07/22 0000 203 lb 7.8 oz (92.3 kg)   06/06/22 0515 198 lb 6.6 oz (90 kg)       Constitutional:  Alert, awake, no apparent distress  Cardiovascular:  S1, S2 without m/r/g  Respiratory:  CTA B without w/r/r  Abdomen: +bs, soft, nt  Ext: nil LE edema. Data/  Recent Labs     06/06/22  0502 06/06/22  1443 06/07/22  0358 06/07/22  1135 06/07/22  1930 06/08/22  0425 06/08/22  1233   WBC 3.7  --  4.4  --   --  4.4  --    HGB 6.9*   < > 8.3*   < > 8.4* 8.1* 7.4*   HCT 21.9*   < > 26.3*   < > 27.5* 26.7* 24.9*   MCV 88.9  --  90.7  --   --  91.9  --    *  --  135*  --   --  135*  --     < > = values in this interval not displayed. Recent Labs     06/06/22  0502 06/07/22  0358 06/08/22  0425   * 133* 132*   K 4.1 4.4 4.0    104 104   CO2 22 23 21   GLUCOSE 94 105* 95   BUN 6* 5* 3*   CREATININE 0.69* 0.59* 0.58*   LABGLOM >60 >60 >60   GFRAA >60 >60 >60         Assessment/   1. Acute on chronic hypotonic hyponatremia with recent worsening to  119 mmol/l. Urine sodium is less than 20 and urine osmolality 119, likely secondary to decreased effective arterial volume and liver disease + decrease in solute intake  SIADH is a differential with the use of Prozac but not supported by low urine sodium and urine osmolarity. Serum sodium gradually improving      2. Chronic liver disease complicated by cirrhosis and ascites[abdominal paracentesis 1 month ago with removal of 11 L]     3.  Acute kidney injury secondary to prerenal component from diuretic-resolved     4. Acute anemia-rule out blood loss-Hb 7.0 gm/dl    5.   Hypotension  Plan/   DC IV fluids  Monitor strict urine output  Midodrine 5 mg 3 times daily for systolic less than 90  Oral Fluid restriction of 1500 mls/day        [facility], MD

## 2022-06-08 NOTE — PLAN OF CARE
Problem: Discharge Planning  Goal: Discharge to home or other facility with appropriate resources  Outcome: Progressing     Problem: Safety - Adult  Goal: Free from fall injury  Outcome: Progressing No pain at this time. 0/10 pain scale.        Problem: ABCDS Injury Assessment  Goal: Absence of physical injury  Outcome: Progressing     Problem: Pain  Goal: Verbalizes/displays adequate comfort level or baseline comfort level  Outcome: Progressing     Problem: Chronic Conditions and Co-morbidities  Goal: Patient's chronic conditions and co-morbidity symptoms are monitored and maintained or improved  Outcome: Progressing

## 2022-06-08 NOTE — PROGRESS NOTES
[facility]    Gastroenterology Daily Progress Note      Patient:   Omi Serrano   :       Facility:   [facility]  Date:     2022  Consultant:   MASSIMO Sorenson CNP, CNP      SUBJECTIVE  57 y.o. male admitted 2022 with Hyponatremia [E87.1] and seen for cirrhosis with melena. The pt was seen and examined. He reports no abdominal or esophageal pain. Tolerating a clear diet. hgb 8. 1. no further melena. To get paracentesis today. .        OBJECTIVE  Scheduled Meds:   sodium chloride flush  5-40 mL IntraVENous 2 times per day    sucralfate  1 g Oral 4 times per day    cefTRIAXone (ROCEPHIN) IV  1,000 mg IntraVENous Q24H    pantoprazole (PROTONIX) 40 mg injection  40 mg IntraVENous Q12H    iron sucrose  200 mg IntraVENous Q24H    atorvastatin  20 mg Oral Nightly    [Held by provider] furosemide  20 mg Oral BID    lactulose  10 g Oral TID    nadolol  20 mg Oral Daily    [Held by provider] spironolactone  50 mg Oral QPM    sodium chloride flush  5-40 mL IntraVENous 2 times per day    [Held by provider] enoxaparin  40 mg SubCUTAneous Daily       Vital Signs:  /68   Pulse 74   Temp 98.1 °F (36.7 °C) (Oral)   Resp 18   Ht 5' 10\" (1.778 m)   Wt 209 lb 7 oz (95 kg)   SpO2 96%   BMI 30.05 kg/m²      Physical Exam:     General Appearance: alert and oriented to person, place and time, well-developed and well-nourished, in no acute distress  Skin: warm and dry, no rash or erythema  Head: normocephalic and atraumatic  Eyes: pupils equal, round, and reactive to light, extraocular eye movements intact, conjunctivae normal  ENT: hearing grossly normal bilaterally  Neck: neck supple and non tender without mass, no thyromegaly or thyroid nodules, no cervical lymphadenopathy   Pulmonary/Chest: clear to auscultation bilaterally- no wheezes, rales or rhonchi, normal air movement, no respiratory distress  Cardiovascular: normal rate, regular rhythm, normal S1 and S2, no murmurs, rubs, clicks or gallops, distal pulses intact, no carotid bruits  Abdomen: soft, non-tender, distended, normal bowel sounds, no masses or organomegaly  Extremities: no cyanosis, clubbing or edema  Musculoskeletal: normal range of motion, no joint swelling, deformity or tenderness  Neurologic: no cranial nerve deficit and muscle strength normal    Lab and Imaging Review     CBC  Recent Labs     06/06/22  0502 06/06/22  1443 06/07/22  0358 06/07/22  0358 06/07/22  1135 06/07/22  1930 06/08/22  0425   WBC 3.7  --  4.4  --   --   --  4.4   HGB 6.9*   < > 8.3*   < > 8.2* 8.4* 8.1*   HCT 21.9*   < > 26.3*   < > 26.6* 27.5* 26.7*   MCV 88.9  --  90.7  --   --   --  91.9   *  --  135*  --   --   --  135*    < > = values in this interval not displayed. BMP  Recent Labs     06/06/22  0502 06/07/22  0358 06/08/22  0425   * 133* 132*   K 4.1 4.4 4.0    104 104   CO2 22 23 21   BUN 6* 5* 3*   CREATININE 0.69* 0.59* 0.58*   GLUCOSE 94 105* 95   CALCIUM 7.9* 8.0* 7.9*       LFTS  Recent Labs     06/06/22  0502 06/07/22  0358 06/08/22  0425   ALKPHOS 89 94 86   ALT 8 8 7   AST 18 21 18   PROT 4.2* 4.2* 4.5*   BILITOT 1.88* 2.35* 1.91*   LABALBU 2.3* 2.2* 2.5*         PT/INR  Recent Labs     06/07/22  1317   PROTIME 16.9*   INR 1.4     Results for Ton Cali (MRN [id number]) as of 6/7/2022 09:50    Ref. Range 4/13/2022 15:37   AFP (Alpha Fetoprotein) Latest Ref Range: <8.4 ug/L 2.5         FINDINGS: egd dr Khalida Borjas 6/6/22  Esophagus: The esophagus was inspected to the Z-line. The endoscopic exam showed evidence of prior distal esophagectomy.  F2 varices were noted.  A 6 to 7 mm inflammatory appearing polyp was noted at around 30 cm.  It was not clear if this is related to his prior cancer versus inflammatory polyp versus organized clot at the site of recent bleeding.  We did not want to directly band this area as it will slough off and we will not be able to obtain any tissue diagnosis.  We opted to band the varices distal and proximal to this area.  Adequate decompression of varices was noted.      Stomach: The stomach was inspected in both forward and retroflex fashion and was appropriately distensible. The cardia, fundus, incisura, antrum and pylorus were identified via direct visualization. The endoscopic exam showed mild to moderate portal hypertensive gastropathy in proximal stomach.      Duodenum: The proximal small bowel was inspected through the bulb, sweep, and second portion of the duodenum. The endoscopic exam showed no pathology. ASSESSMENT/plan  1. Anemia with melena s/p egd with banding ; a inflammatory polyp was  Seen at 30 cm, no melena overnight  -will advance to full  diet  -will need repeat egd tomorrow to reassess the polyp due to the hx of esophageal cancer  -ppi bid iv  -continue sandostatin drip, the pt is refusing to have another iv started for a ppi drip  -liquid carafate x2 weeks  -trend hh  paracentesis today with labs  -no ng tubes  -avoid sedatives and narcotics     2.cirrhosis secondary to alcohol abuse  -continue abx       3.hx esophageal cancer         This plan was formulated in collaboration with Dr. Negrita Meng  .     Electronically signed by: MASSIMO Felix - CNP on 6/8/2022 at 7:33 AM

## 2022-06-08 NOTE — PROGRESS NOTES
Comprehensive Nutrition Assessment    Type and Reason for Visit:  Initial    Nutrition Recommendations/Plan:   1. Added 1500 ml fluid restriction as recommended by nephrology. Malnutrition Assessment:  Malnutrition Status:  No malnutrition (06/08/22 1441)    Context:  Acute Illness     Findings of the 6 clinical characteristics of malnutrition:  Energy Intake:  No significant decrease in energy intake  Weight Loss:  No significant weight loss     Body Fat Loss:  No significant body fat loss     Muscle Mass Loss:  No significant muscle mass loss    Fluid Accumulation:  No significant fluid accumulation     Strength:  Not Performed    Nutrition Assessment:    Pt being seen for length of stay. Pt went for GI appointment on 6-1 with labs drawn noting sodium level of 119. Pt was having some nausea & fatigue but always feels this way with the cirrhosis. Pt is on a full liquid  2 gm sodium diet tolerating well, writer did let pt know the nephrologist also wants to limit fluid intake to 1500 ml. Pt may be having another EGD done tomorrow. Nutrition Related Findings:    No edema. Hx cirrhosis of liver with ascites. Paracentesis done on 4-27 with 11 liters removed, paracentesis done today with 1800 ml removed. Wound Type: None       Current Nutrition Intake & Therapies:    Average Meal Intake: %     Diet NPO Exceptions are: Sips of Water with Meds  ADULT DIET; Full Liquid; Low Sodium (2 gm); 1500 ml    Anthropometric Measures:  Height: 5' 10\" (177.8 cm)  Ideal Body Weight (IBW): 166 lbs (75 kg)    Admission Body Weight: 209 lb (94.8 kg)  Current Body Weight: 209 lb (94.8 kg), 125.9 % IBW. Weight Source: Bed Scale  Current BMI (kg/m2): 30  Usual Body Weight: 216 lb (98 kg)  % Weight Change (Calculated): -3.2                    BMI Categories: Obese Class 1 (BMI 30.0-34. 9)    Estimated Daily Nutrient Needs:  Energy Requirements Based On: Kcal/kg  Weight Used for Energy Requirements: Admission  Energy (kcal/day): 1414-0760 kcals based on 20-22 kcals/kg using adm wt 95 kg  Weight Used for Protein Requirements: Ideal  Protein (g/day):  gm protein based on 1.3-1.4 gm/kg using IBW       Nutrition Diagnosis:   · Inadequate oral intake related to altered GI function as evidenced by intake 26-50%,intake 51-75%,other (comment) (only getting full liquid)      Nutrition Interventions:   Food and/or Nutrient Delivery: Modify Current Diet  Nutrition Education/Counseling: Education not indicated,No recommendation at this time  Coordination of Nutrition Care: Continue to monitor while inpatient       Goals:     Goals: Meet at least 75% of estimated needs       Nutrition Monitoring and Evaluation:   Behavioral-Environmental Outcomes: None Identified  Food/Nutrient Intake Outcomes: Diet Advancement/Tolerance,Food and Nutrient Intake  Physical Signs/Symptoms Outcomes: Biochemical Data,GI Status,Fluid Status or Edema,Nutrition Focused Physical Findings,Skin,Weight    Discharge Planning:    Continue current diet     Edvin Lema, [address] [place] [address] [facility]King[facility]5

## 2022-06-08 NOTE — CARE COORDINATION
ONGOING DISCHARGE PLAN:    Patient is alert and oriented x4. Spoke with patient regarding discharge plan and patient confirms that plan is still to discharge to home with [facility]  Patient had a paracentesis today [id number] removed  Patient will have a EGD tomorrow  PPI iv BID  Serum NA Q12 hours  Fluid Restriction 1500 ml      Will continue to follow for additional discharge needs.     Electronically signed by Lenore Segal RN on 6/8/2022 at 12:44 PM

## 2022-06-09 ENCOUNTER — ANESTHESIA EVENT (OUTPATIENT)
Dept: ENDOSCOPY | Age: 62
DRG: 641 | End: 2022-06-09
Payer: MEDICARE

## 2022-06-09 ENCOUNTER — ANESTHESIA (OUTPATIENT)
Dept: ENDOSCOPY | Age: 62
DRG: 641 | End: 2022-06-09
Payer: MEDICARE

## 2022-06-09 VITALS
DIASTOLIC BLOOD PRESSURE: 55 MMHG | HEIGHT: 70 IN | BODY MASS INDEX: 27.49 KG/M2 | HEART RATE: 63 BPM | OXYGEN SATURATION: 97 % | SYSTOLIC BLOOD PRESSURE: 102 MMHG | WEIGHT: 192 LBS | RESPIRATION RATE: 12 BRPM | TEMPERATURE: 98 F

## 2022-06-09 LAB
ABSOLUTE EOS #: 0 K/UL (ref 0–0.4)
ABSOLUTE LYMPH #: 0.44 K/UL (ref 1–4.8)
ABSOLUTE MONO #: 0.33 K/UL (ref 0.1–1.3)
ALBUMIN SERPL-MCNC: 2.8 G/DL (ref 3.5–5.2)
ALP BLD-CCNC: 76 U/L (ref 40–129)
ALT SERPL-CCNC: 6 U/L (ref 5–41)
ANION GAP SERPL CALCULATED.3IONS-SCNC: 7 MMOL/L (ref 9–17)
AST SERPL-CCNC: 15 U/L
BASOPHILS # BLD: 1 % (ref 0–2)
BASOPHILS ABSOLUTE: 0.04 K/UL (ref 0–0.2)
BILIRUB SERPL-MCNC: 1.53 MG/DL (ref 0.3–1.2)
BUN BLDV-MCNC: 3 MG/DL (ref 8–23)
CALCIUM SERPL-MCNC: 8.1 MG/DL (ref 8.6–10.4)
CHLORIDE BLD-SCNC: 104 MMOL/L (ref 98–107)
CO2: 22 MMOL/L (ref 20–31)
CREAT SERPL-MCNC: 0.48 MG/DL (ref 0.7–1.2)
EOSINOPHILS RELATIVE PERCENT: 0 % (ref 0–4)
FERRITIN: 296 NG/ML (ref 30–400)
GFR AFRICAN AMERICAN: >60 ML/MIN
GFR NON-AFRICAN AMERICAN: >60 ML/MIN
GFR SERPL CREATININE-BSD FRML MDRD: ABNORMAL ML/MIN/{1.73_M2}
GLUCOSE BLD-MCNC: 108 MG/DL (ref 70–99)
HCT VFR BLD CALC: 25.3 % (ref 41–53)
HCT VFR BLD CALC: 25.6 % (ref 41–53)
HCT VFR BLD CALC: 29.6 % (ref 41–53)
HEMOGLOBIN: 7.9 G/DL (ref 13.5–17.5)
HEMOGLOBIN: 7.9 G/DL (ref 13.5–17.5)
HEMOGLOBIN: 9 G/DL (ref 13.5–17.5)
IRON SATURATION: 49 % (ref 20–55)
IRON: 72 UG/DL (ref 59–158)
LYMPHOCYTES # BLD: 12 % (ref 24–44)
MCH RBC QN AUTO: 27.8 PG (ref 26–34)
MCHC RBC AUTO-ENTMCNC: 30.7 G/DL (ref 31–37)
MCV RBC AUTO: 90.6 FL (ref 80–100)
MONOCYTES # BLD: 9 % (ref 1–7)
MORPHOLOGY: ABNORMAL
MORPHOLOGY: ABNORMAL
PDW BLD-RTO: 16.2 % (ref 11.5–14.9)
PLATELET # BLD: 121 K/UL (ref 150–450)
PMV BLD AUTO: 7.1 FL (ref 6–12)
POTASSIUM SERPL-SCNC: 4 MMOL/L (ref 3.7–5.3)
RBC # BLD: 2.82 M/UL (ref 4.5–5.9)
SEG NEUTROPHILS: 78 % (ref 36–66)
SEGMENTED NEUTROPHILS ABSOLUTE COUNT: 2.89 K/UL (ref 1.3–9.1)
SODIUM BLD-SCNC: 133 MMOL/L (ref 135–144)
TOTAL IRON BINDING CAPACITY: 146 UG/DL (ref 250–450)
TOTAL PROTEIN: 4.2 G/DL (ref 6.4–8.3)
UNSATURATED IRON BINDING CAPACITY: 74 UG/DL (ref 112–347)
WBC # BLD: 3.7 K/UL (ref 3.5–11)

## 2022-06-09 PROCEDURE — 2580000003 HC RX 258: Performed by: INTERNAL MEDICINE

## 2022-06-09 PROCEDURE — 0DJ08ZZ INSPECTION OF UPPER INTESTINAL TRACT, VIA NATURAL OR ARTIFICIAL OPENING ENDOSCOPIC: ICD-10-PCS | Performed by: INTERNAL MEDICINE

## 2022-06-09 PROCEDURE — 80053 COMPREHEN METABOLIC PANEL: CPT

## 2022-06-09 PROCEDURE — 2500000003 HC RX 250 WO HCPCS: Performed by: NURSE ANESTHETIST, CERTIFIED REGISTERED

## 2022-06-09 PROCEDURE — 99239 HOSP IP/OBS DSCHRG MGMT >30: CPT | Performed by: INTERNAL MEDICINE

## 2022-06-09 PROCEDURE — A4216 STERILE WATER/SALINE, 10 ML: HCPCS | Performed by: INTERNAL MEDICINE

## 2022-06-09 PROCEDURE — 83550 IRON BINDING TEST: CPT

## 2022-06-09 PROCEDURE — 2580000003 HC RX 258: Performed by: ANESTHESIOLOGY

## 2022-06-09 PROCEDURE — 7100000001 HC PACU RECOVERY - ADDTL 15 MIN: Performed by: INTERNAL MEDICINE

## 2022-06-09 PROCEDURE — 3609017100 HC EGD: Performed by: INTERNAL MEDICINE

## 2022-06-09 PROCEDURE — 6360000002 HC RX W HCPCS: Performed by: INTERNAL MEDICINE

## 2022-06-09 PROCEDURE — 3700000000 HC ANESTHESIA ATTENDED CARE: Performed by: INTERNAL MEDICINE

## 2022-06-09 PROCEDURE — 6370000000 HC RX 637 (ALT 250 FOR IP): Performed by: INTERNAL MEDICINE

## 2022-06-09 PROCEDURE — 85018 HEMOGLOBIN: CPT

## 2022-06-09 PROCEDURE — 83540 ASSAY OF IRON: CPT

## 2022-06-09 PROCEDURE — 85014 HEMATOCRIT: CPT

## 2022-06-09 PROCEDURE — C9113 INJ PANTOPRAZOLE SODIUM, VIA: HCPCS | Performed by: INTERNAL MEDICINE

## 2022-06-09 PROCEDURE — 7100000000 HC PACU RECOVERY - FIRST 15 MIN: Performed by: INTERNAL MEDICINE

## 2022-06-09 PROCEDURE — 36415 COLL VENOUS BLD VENIPUNCTURE: CPT

## 2022-06-09 PROCEDURE — 82728 ASSAY OF FERRITIN: CPT

## 2022-06-09 PROCEDURE — 6360000002 HC RX W HCPCS: Performed by: NURSE ANESTHETIST, CERTIFIED REGISTERED

## 2022-06-09 PROCEDURE — 43235 EGD DIAGNOSTIC BRUSH WASH: CPT | Performed by: INTERNAL MEDICINE

## 2022-06-09 PROCEDURE — 85025 COMPLETE CBC W/AUTO DIFF WBC: CPT

## 2022-06-09 PROCEDURE — 2709999900 HC NON-CHARGEABLE SUPPLY: Performed by: INTERNAL MEDICINE

## 2022-06-09 RX ORDER — LIDOCAINE HYDROCHLORIDE 20 MG/ML
INJECTION, SOLUTION EPIDURAL; INFILTRATION; INTRACAUDAL; PERINEURAL PRN
Status: DISCONTINUED | OUTPATIENT
Start: 2022-06-09 | End: 2022-06-09 | Stop reason: SDUPTHER

## 2022-06-09 RX ORDER — SODIUM CHLORIDE 9 MG/ML
INJECTION, SOLUTION INTRAVENOUS CONTINUOUS
Status: DISCONTINUED | OUTPATIENT
Start: 2022-06-09 | End: 2022-06-09 | Stop reason: HOSPADM

## 2022-06-09 RX ORDER — SODIUM CHLORIDE 9 MG/ML
INJECTION, SOLUTION INTRAVENOUS PRN
Status: DISCONTINUED | OUTPATIENT
Start: 2022-06-09 | End: 2022-06-09 | Stop reason: HOSPADM

## 2022-06-09 RX ORDER — SODIUM CHLORIDE 0.9 % (FLUSH) 0.9 %
5-40 SYRINGE (ML) INJECTION EVERY 12 HOURS SCHEDULED
Status: DISCONTINUED | OUTPATIENT
Start: 2022-06-09 | End: 2022-06-09 | Stop reason: HOSPADM

## 2022-06-09 RX ORDER — DIPHENHYDRAMINE HYDROCHLORIDE 50 MG/ML
12.5 INJECTION INTRAMUSCULAR; INTRAVENOUS
Status: DISCONTINUED | OUTPATIENT
Start: 2022-06-09 | End: 2022-06-09 | Stop reason: HOSPADM

## 2022-06-09 RX ORDER — SUCRALFATE 1 G/1
1 TABLET ORAL 4 TIMES DAILY
Qty: 56 TABLET | Refills: 0 | Status: SHIPPED | OUTPATIENT
Start: 2022-06-09 | End: 2022-09-13

## 2022-06-09 RX ORDER — SODIUM CHLORIDE 0.9 % (FLUSH) 0.9 %
5-40 SYRINGE (ML) INJECTION PRN
Status: DISCONTINUED | OUTPATIENT
Start: 2022-06-09 | End: 2022-06-09 | Stop reason: HOSPADM

## 2022-06-09 RX ORDER — FUROSEMIDE 20 MG/1
20 TABLET ORAL DAILY
Qty: 60 TABLET | Refills: 3 | Status: SHIPPED | OUTPATIENT
Start: 2022-06-09 | End: 2022-06-13

## 2022-06-09 RX ORDER — PANTOPRAZOLE SODIUM 40 MG/1
40 TABLET, DELAYED RELEASE ORAL
Status: DISCONTINUED | OUTPATIENT
Start: 2022-06-10 | End: 2022-06-09 | Stop reason: HOSPADM

## 2022-06-09 RX ORDER — PANTOPRAZOLE SODIUM 40 MG/1
40 TABLET, DELAYED RELEASE ORAL 2 TIMES DAILY
Qty: 28 TABLET | Refills: 0 | Status: SHIPPED | OUTPATIENT
Start: 2022-06-09 | End: 2022-09-13

## 2022-06-09 RX ORDER — PROPOFOL 10 MG/ML
INJECTION, EMULSION INTRAVENOUS PRN
Status: DISCONTINUED | OUTPATIENT
Start: 2022-06-09 | End: 2022-06-09 | Stop reason: SDUPTHER

## 2022-06-09 RX ORDER — FUROSEMIDE 20 MG/1
20 TABLET ORAL 2 TIMES DAILY
Qty: 60 TABLET | Refills: 0 | Status: SHIPPED | OUTPATIENT
Start: 2022-06-09 | End: 2022-06-09 | Stop reason: HOSPADM

## 2022-06-09 RX ORDER — METOCLOPRAMIDE HYDROCHLORIDE 5 MG/ML
10 INJECTION INTRAMUSCULAR; INTRAVENOUS
Status: DISCONTINUED | OUTPATIENT
Start: 2022-06-09 | End: 2022-06-09 | Stop reason: HOSPADM

## 2022-06-09 RX ORDER — SPIRONOLACTONE 50 MG/1
50 TABLET, FILM COATED ORAL EVERY EVENING
Qty: 30 TABLET | Refills: 3 | Status: SHIPPED | OUTPATIENT
Start: 2022-06-09 | End: 2022-06-23

## 2022-06-09 RX ORDER — ONDANSETRON 2 MG/ML
4 INJECTION INTRAMUSCULAR; INTRAVENOUS
Status: DISCONTINUED | OUTPATIENT
Start: 2022-06-09 | End: 2022-06-09 | Stop reason: HOSPADM

## 2022-06-09 RX ADMIN — SODIUM CHLORIDE: 9 INJECTION, SOLUTION INTRAVENOUS at 11:10

## 2022-06-09 RX ADMIN — LIDOCAINE HYDROCHLORIDE 60 MG: 20 INJECTION, SOLUTION EPIDURAL; INFILTRATION; INTRACAUDAL; PERINEURAL at 11:46

## 2022-06-09 RX ADMIN — PROPOFOL 70 MG: 10 INJECTION, EMULSION INTRAVENOUS at 11:46

## 2022-06-09 RX ADMIN — SUCRALFATE 1 G: 1 TABLET ORAL at 00:39

## 2022-06-09 RX ADMIN — OCTREOTIDE ACETATE 50 MCG/HR: 500 INJECTION, SOLUTION INTRAVENOUS; SUBCUTANEOUS at 06:27

## 2022-06-09 RX ADMIN — SODIUM CHLORIDE 40 MG: 9 INJECTION INTRAMUSCULAR; INTRAVENOUS; SUBCUTANEOUS at 06:10

## 2022-06-09 ASSESSMENT — ENCOUNTER SYMPTOMS: SHORTNESS OF BREATH: 1

## 2022-06-09 ASSESSMENT — PAIN SCALES - GENERAL: PAINLEVEL_OUTOF10: 2

## 2022-06-09 NOTE — PROGRESS NOTES
[place] [address] Internal Medicine    Progress Note     6/9/2022    9:56 AM    Name:   Nena Dos Santos  MRN:     [id number]     Acct:      [de-identified]   Room:   [facility] Day:  7  Admit Date:  6/2/2022 10:24 AM    PCP:   VASU Dubose  Code Status:  Full Code    Subjective:     C/C:   Chief Complaint   Patient presents with    Abnormal Lab     Principal Problem (Resolved): Hyponatremia  Active Problems:    Drop in hemoglobin    Portal hypertension (HCC)    Secondary esophageal varices (HCC)    Esophageal polyp    Chronic viral hepatitis B without delta agent and without coma (HCC)    Cirrhosis (HCC)    Hepatic cirrhosis (HCC)    History of hepatitis C    Thrombocytopenia (HCC)    No acute episodes overnight. Patient is heme stable with Tmax of 98.4  Patient denies any further episodes of melena. Vitals reviewed. Blood pressure on the lower side (expected to be soft due to cirrhosis). Remains on Midodrine 5 TID. Lasix and Aldactone held. AM CBC and BMP reviewed. Sodium improved and appropriate. Bilirubin trending down. Hemoglobin stable. No leukocytosis. Patient had paracentesis yesterday and had approximately 11 liters removed. Received albumin post procedure. No signs on infection at needle insertion site. Fluid studies reviewed. Appears to be transudative in nature based on P/S protein ratio of approximately 0.28. Official body fluid culture pending.    Urine output 1.3 liters over last 24 hours      Significant last 24 hr data reviewed ;   Vitals:    06/08/22 1830 06/08/22 1948 06/08/22 2345 06/09/22 0648   BP: (!) 95/55  99/63 98/63   Pulse: 66 66 65 68   Resp: 16  18 18   Temp: 97.9 °F (36.6 °C)  98.2 °F (36.8 °C) 98.4 °F (36.9 °C)   TempSrc: Oral  Oral Oral   SpO2: 99%  100% 95%   Weight:   192 lb 3.9 oz (87.2 kg)    Height:          Recent Results (from the past 24 hour(s))   Hemoglobin and Hematocrit    Collection Time: 06/08/22 12:33 PM   Result Value Ref Range    Hemoglobin 7.4 (L) 13.5 - 17.5 g/dL    Hematocrit 24.9 (L) 41 - 53 %   Hemoglobin and Hematocrit    Collection Time: 06/08/22  7:42 PM   Result Value Ref Range    Hemoglobin 8.0 (L) 13.5 - 17.5 g/dL    Hematocrit 26.2 (L) 41 - 53 %   Hemoglobin and Hematocrit    Collection Time: 06/09/22  4:03 AM   Result Value Ref Range    Hemoglobin 7.9 (L) 13.5 - 17.5 g/dL    Hematocrit 25.3 (L) 41 - 53 %   Comprehensive Metabolic Panel w/ Reflex to MG    Collection Time: 06/09/22  4:03 AM   Result Value Ref Range    Glucose 108 (H) 70 - 99 mg/dL    BUN 3 (L) 8 - 23 mg/dL    CREATININE 0.48 (L) 0.70 - 1.20 mg/dL    Calcium 8.1 (L) 8.6 - 10.4 mg/dL    Sodium 133 (L) 135 - 144 mmol/L    Potassium 4.0 3.7 - 5.3 mmol/L    Chloride 104 98 - 107 mmol/L    CO2 22 20 - 31 mmol/L    Anion Gap 7 (L) 9 - 17 mmol/L    Alkaline Phosphatase 76 40 - 129 U/L    ALT 6 5 - 41 U/L    AST 15 <40 U/L    Total Bilirubin 1.53 (H) 0.3 - 1.2 mg/dL    Total Protein 4.2 (L) 6.4 - 8.3 g/dL    Albumin 2.8 (L) 3.5 - 5.2 g/dL    GFR Non-African American >60 >60 mL/min    GFR African American >60 >60 mL/min    GFR Comment         CBC with Auto Differential    Collection Time: 06/09/22  4:03 AM   Result Value Ref Range    WBC 3.7 3.5 - 11.0 k/uL    RBC 2.82 (L) 4.5 - 5.9 m/uL    Hemoglobin 7.9 (L) 13.5 - 17.5 g/dL    Hematocrit 25.6 (L) 41 - 53 %    MCV 90.6 80 - 100 fL    MCH 27.8 26 - 34 pg    MCHC 30.7 (L) 31 - 37 g/dL    RDW 16.2 (H) 11.5 - 14.9 %    Platelets 090 (L) 109 - 450 k/uL    MPV 7.1 6.0 - 12.0 fL    Seg Neutrophils 78 (H) 36 - 66 %    Lymphocytes 12 (L) 24 - 44 %    Monocytes 9 (H) 1 - 7 %    Eosinophils % 0 0 - 4 %    Basophils 1 0 - 2 %    Segs Absolute 2.89 1.3 - 9.1 k/uL    Absolute Lymph # 0.44 (L) 1.0 - 4.8 k/uL    Absolute Mono # 0.33 0.1 - 1.3 k/uL    Absolute Eos # 0.00 0.0 - 0.4 k/uL    Basophils Absolute 0.04 0.0 - 0.2 k/uL    Morphology ANISOCYTOSIS PRESENT     Morphology SLT POLYCHROMASIA      No results for input(s): POCGLU in the last 72 hours. IR US GUIDED PARACENTESIS    Result Date: 6/8/2022  PROCEDURE: PARACENTESIS WITHOUT IMAGE GUIDANCE US ABDOMEN LIMITED 6/8/2022 HISTORY: ORDERING SYSTEM PROVIDED HISTORY: ascites TECHNOLOGIST PROVIDED HISTORY: ascites TECHNIQUE: Informed consent was obtained after a detailed explanation of the procedure including risks and benefits. Universal protocol was followed. A limited ultrasound of the abdomen was performed. The right abdomen was prepped and draped in sterile fashion, and local anesthesia was achieved with 1% lidocaine. A 5 Danish needle sheath was advanced into ascites, and paracentesis was performed. A total of 11.8 L of translucent yellow fluid was aspirated. The patient tolerated the procedure well. FINDINGS: Limited abdominal ultrasound yielding a large amount of ascites. Successful paracentesis. Intravenous albumin administration is recommended. On admission     Patient is a 57-year-old male with history of cirrhosis secondary to hep C, scented to the ED today due to abnormal lab results.  Patient was seen by GI yesterday and had lab work ordered which came back this morning and showed hyponatremia of 119.  Patient is complaining of mild symptoms such as nausea and fatigue but otherwise is asymptomatic.  Patient denies chest pain, shortness of breath, dizziness, lightheadedness, shortness of breath and vomiting.  Patient denies any blood in his stool or any changes in urination. Electrolyte panel done in the ED showed sodium of 119.  CBC unremarkable.  Patient's serum osmolarity is decreased at 259. Pressure in the ED 96/51.   Nephrology was consulted in the ED.     Patient is a 57-year-old male with history of cirrhosis secondary to hep C, scented to the ED today due to abnormal lab results. Juliet Orozco was seen by DANIAL yesterday and had lab work ordered which came back this morning and showed hyponatremia of 119.  Patient is complaining of mild symptoms such as nausea and fatigue but otherwise is asymptomatic.  Patient denies chest pain, shortness of breath, dizziness, lightheadedness, shortness of breath and vomiting.  Patient denies any blood in his stool or any changes in urination. Electrolyte panel done in the ED showed sodium of 119.  CBC unremarkable.  Patient's serum osmolarity is decreased at 259. Pressure in the ED 96/51. Nephrology was consulted in the ED.    6/7/22 - S/p egd      HISTORY: Mr. Frank Lisa is a 57 y. o. male who presents to the [place] [address] Endoscopy unit for upper endoscopy. The patient's clinical history is remarkable for melena in setting of cirrhosis.  History of esophageal cancer status post resection. He is currently medically stable and appropriate for the planned procedure.      PREOPERATIVE Jolyne Hummer in setting of cirrhosis.  History of esophageal cancer status post resection.      PROCEDURES:   1) Transoral Upper Endoscopy, esophageal band ligation.      POSTOPERATIVE DIAGNOSIS:   F2 esophageal varices  Inflammatory polyp in distal esophagus  Evidence of prior distal esophagectomy  Mild to moderate portal hypertensive gastropathy    6/8/22 - s/p paracentesis  11 liters removed and transudative in etiology. Review of Systems:     Constitutional: Negative for chills and fever. Respiratory: Negative for shortness of breath. Cardiovascular: Negative for chest pain. Gastrointestinal: Negative for abdominal pain, constipation, diarrhea, nausea and vomiting. Genitourinary: Negative for dysuria. Neurological: Negative for dizziness, light-headedness, numbness and headaches. Data:     Past Medical History: No change     Social History:  No change    Family History: @no change    Vitals:  Reviewed  Blood pressure on softer side as expected. Lasix and Aldactone held. I/O (24Hr):     Intake/Output Summary (Last 24 hours) at 6/9/2022 0948  Last data filed at 6/9/2022 0650  Gross per 24 hour   Intake 1458 ml   Output 1300 ml   Net 158 ml     Labs:  CBC, BMP, and hepatic function tests reviewed  Iron, TIBC, and Ferrtin added on. MCV normal but patient is on Venofer for 5 days (Day 4 today0    Radiology:  N/A    Medications:     Current Meds:   Scheduled Meds:    sodium chloride flush  5-40 mL IntraVENous 2 times per day    sucralfate  1 g Oral 4 times per day    cefTRIAXone (ROCEPHIN) IV  1,000 mg IntraVENous Q24H    pantoprazole (PROTONIX) 40 mg injection  40 mg IntraVENous Q12H    iron sucrose  200 mg IntraVENous Q24H    atorvastatin  20 mg Oral Nightly    [Held by provider] furosemide  20 mg Oral BID    lactulose  10 g Oral TID    nadolol  20 mg Oral Daily    [Held by provider] spironolactone  50 mg Oral QPM    sodium chloride flush  5-40 mL IntraVENous 2 times per day    [Held by provider] enoxaparin  40 mg SubCUTAneous Daily     Continuous Infusions:    sodium chloride      sodium chloride      sodium chloride      octreotide (SandoSTATIN) infusion 50 mcg/hr (22 06)    sodium chloride       PRN Meds: sodium chloride, sodium chloride, sodium chloride flush, sodium chloride, meperidine, fentanNYL, fentanNYL, midodrine, sodium chloride flush, sodium chloride, ondansetron **OR** ondansetron, polyethylene glycol    Physical Examination:        BP 98/63   Pulse 68   Temp 98.4 °F (36.9 °C) (Oral)   Resp 18   Ht 5' 10\" (1.778 m)   Wt 192 lb 3.9 oz (87.2 kg)   SpO2 95%   BMI 27.58 kg/m²   Temp (24hrs), Av.1 °F (36.7 °C), Min:97.9 °F (36.6 °C), Max:98.4 °F (36.9 °C)    No results for input(s): POCGLU in the last 72 hours. Intake/Output Summary (Last 24 hours) at 2022 0956  Last data filed at 2022 0650  Gross per 24 hour   Intake 1458 ml   Output 1300 ml   Net 158 ml     General Appearance:  alert, well appearing, and in no acute distress  Mental status:   Head:  normocephalic, atraumatic.   Eye: no icterus, redness, pupils equal and reactive, extraocular eye movements intact, conjunctiva clear  Ear: normal external ear, no discharge, hearing intact  Nose:  no drainage noted  Mouth: mucous membranes moist  Neck: supple, no carotid bruits, thyroid not palpable  Lungs: Bilateral equal air entry, clear to ausculation, no wheezing, rales or rhonchi, normal effort  Cardiovascular: normal rate, regular rhythm, no murmur, gallop, rub. Abdomen: Soft, nontender, nondistended, normal bowel sounds, no hepatomegaly or splenomegaly  Neurologic: There are no new focal motor or sensory deficits,   Skin: No gross lesions, rashes, bruising or bleeding on exposed skin area  Extremities:  peripheral pulses palpable, no pedal edema or calf pain with palpation  Psych:     Abdomen soft and nontender. No signs of infection at paracentesis site. Assessment:        Primary Problem  Hyponatremia    Principal Problem (Resolved): Hyponatremia  Active Problems:    Drop in hemoglobin    Portal hypertension (HCC)    Secondary esophageal varices (HCC)    Esophageal polyp    Chronic viral hepatitis B without delta agent and without coma (HCC)    Cirrhosis (HCC)    Hepatic cirrhosis (HCC)    History of hepatitis C    Thrombocytopenia ([address]     Plan:        6/9/22    -Plan for repeat EGD today per GI. Patient is NPO. -Follow up in GI recommendations post EGD. -Continue midodrine 5 TID and follow up on GI recommendations regarding Lasix and Aldactone.    -Follow up on Iron studies and de-escalate/continue venofer pending results. Scheduled to end tomorrow after 5 day course. -Ceftriaxone discontinued. No signs of SBP. Paracentesis fluid appears to be transudative.   -Follow up on Nephrology recommendations.      Hospital Problems           Last Modified POA    Drop in hemoglobin 6/3/2022 Yes    Portal hypertension ([address] 6/3/2022 Yes    Secondary esophageal varices ([address] 6/7/2022 Yes    Esophageal polyp 6/7/2022 Yes    Chronic viral hepatitis B without delta agent and without coma ([address] 6/6/2022 Yes    Cirrhosis ([address] 6/2/2022 Yes    Hepatic cirrhosis ([address] (Chronic) 6/6/2022 Yes    History of hepatitis C 6/6/2022 Yes    Thrombocytopenia ([address] 6/6/2022 Yes               Disposition: Potential discharge tomorrow pending EGD results and GI recommendations. Plan remains to discharge home with home health care.  efforts appreciated. Further recommendations after discussion with Dr. Venkat Dunn. Kermit Montelongo MD  PGY-2, Internal Medicine Resident  [facility]  6/9/2022 10:33 AM    Attestation and add on       I have discussed the care of Felice Veliz , including pertinent history and exam findings,      6/9/22    with the resident. I have seen and examined the patient and the key elements of all parts of the encounter have been performed by me . I agree with the assessment, plan and orders as documented by the resident. Hospital Problems           Last Modified POA    Drop in hemoglobin 6/3/2022 Yes    Portal hypertension ([address] 6/3/2022 Yes    Secondary esophageal varices ([address] 6/7/2022 Yes    Esophageal polyp 6/7/2022 Yes    Chronic viral hepatitis B without delta agent and without coma ([address] 6/6/2022 Yes    Cirrhosis ([address] 6/2/2022 Yes    Hepatic cirrhosis ([address] (Chronic) 6/6/2022 Yes    History of hepatitis C 6/6/2022 Yes    Thrombocytopenia ([address] 6/6/2022 Yes             ''''''''''       MD CHANDNI Jensen [address], [address] [place] [address].    Phone [phone]   Fax: [phone]  Answering Service: [phone]

## 2022-06-09 NOTE — PLAN OF CARE
Problem: Discharge Planning  Goal: Discharge to home or other facility with appropriate resources  6/9/2022 1611 by Meeta Martinez RN  Outcome: Adequate for Discharge  6/9/2022 0317 by Teddy Barragan RN  Outcome: Progressing     Problem: Safety - Adult  Goal: Free from fall injury  6/9/2022 1611 by Meeta Martinez RN  Outcome: Adequate for Discharge  6/9/2022 0317 by Teddy Barragan RN  Outcome: Progressing  Note: No falls noted this shift. Patient ambulates with x1 staff assistance without difficulty. Bed kept in low position. Safe environment maintained. Bedside table & call light in reach. Uses call light appropriately when needing assistance. Problem: ABCDS Injury Assessment  Goal: Absence of physical injury  6/9/2022 1611 by Meeta Martinez RN  Outcome: Adequate for Discharge  6/9/2022 0317 by Teddy Barragan RN  Outcome: Progressing  Note: . Problem: Pain  Goal: Verbalizes/displays adequate comfort level or baseline comfort level  6/9/2022 1611 by Meeta Martinez RN  Outcome: Adequate for Discharge  6/9/2022 0317 by Teddy Barragan RN  Outcome: Progressing  Note: Pt medicated with pain medication prn. Assessed all pain characteristics including level, type, location, frequency, and onset. Non-pharmacologic interventions offered to pt as well. Pt states pain is tolerable at this time. Will continue to monitor.        Problem: Chronic Conditions and Co-morbidities  Goal: Patient's chronic conditions and co-morbidity symptoms are monitored and maintained or improved  6/9/2022 1611 by Meeta Martinez RN  Outcome: Adequate for Discharge  6/9/2022 0317 by Teddy Barragan RN  Outcome: Progressing

## 2022-06-09 NOTE — CARE COORDINATION
ONGOING DISCHARGE PLAN:    Patient is alert and oriented x4. Spoke with patient regarding discharge plan and patient confirms that plan is still to return to home w/ [facility], [facility]. Pt. Had Paracentesis yesterday w/ 26[address] ML removed. Pt. Had EGD today w/ GI.     DC today w/ no needs. Writer notified, Alan, from [address] [place] [address] today. She is following. Will continue to follow for additional discharge needs.     Electronically signed by Chad Tamayo RN on 6/9/2022 at 4:06 PM

## 2022-06-09 NOTE — OP NOTE
[facility]     PROCEDURE DATE: 06/09/22    REFERRING PHYSICIAN: No ref. provider found     PRIMARY CARE PROVIDER: VASU Lechuga    ATTENDING PHYSICIAN: Erinn Chilel MD     HISTORY: Mr. Richard Goff is a 57 y.o. male who presents to the [place] [address] Endoscopy unit for upper endoscopy. The patient's clinical history is remarkable for upper GI bleeding. History of esophageal cancer status post surgery. Cirrhosis with portal hypertension. He is currently medically stable and appropriate for the planned procedure. PREOPERATIVE DIAGNOSIS: Upper GI bleeding. History of esophageal cancer status post resection. Cirrhosis with portal hypertension. PROCEDURES:   1) Transoral Upper Endoscopy, diagnostic. POSTOPERATIVE DIAGNOSIS:   Healing post banding ulcers  Small low risk esophageal varices  POrtal hypertensive gastropathy    SPECIMENS: None    MEDICATIONS:   MAC per anesthesia    EBL: minimal    INSTRUMENT: Olympus GIF-H190 flexible Gastroscope. PREPARATION: The nature and character of the procedure as well as risks, benefits, and alternatives were discussed with the patient and informed consent was obtained. Complications were said to include, but were not limited to: medication allergy, medication reaction, cardiovascular and respiratory problems, bleeding, perforation, infection, and/or missed diagnosis. Following arrival in the endoscopy room, the patient was placed in the left lateral decubitus position and final time-out accomplished in the presence of the nursing staff. Baseline vital signs were obtained and reviewed, and IV sedation was subsequently initiated. FINDINGS:   Esophagus: The esophagus was inspected to the Z-line. The endoscopic exam showed small low risk esophageal varices. Healing post banding ulcers were noted. The inflammatory area was noted again in the esophagus. This seems to be resolving.   Very likely site of inflammation and possible prior bleeding. No high risk stigmata noted in the area. Stomach: The stomach was inspected in both forward and retroflex fashion and was appropriately distensible. The cardia, fundus, incisura, antrum and pylorus were identified via direct visualization. The endoscopic exam showed moderate portal hypertensive gastropathy in proximal stomach. Duodenum: The proximal small bowel was inspected through the bulb, sweep, and second portion of the duodenum. The endoscopic exam showed no pathology. RECOMMENDATIONS:   1) Transfer back to the floor for further management as per primary care team.   2) Okay to discharge home from GI standpoint. 3) Protonix 40 mg twice a day for 2 weeks then daily after that. Carafate x 2 weeks. 4) Full liquid diet for 3 more days then soft for 1 week. 5) Repeat EGD in 3 to 4 weeks.       Alexis Trejo

## 2022-06-09 NOTE — DISCHARGE SUMMARY
[facility]    Discharge Summary     Patient ID: Walt Burn  :  [id number]   MRN: [id number]     ACCOUNT:  [de-identified]   Patient's PCP: VASU Garnett  Admit Date: 2022   Discharge Date: 2022    Length of Stay: 7  Code Status:  Full Code  Admitting Physician: Margot Cota MD  Discharge Physician: Margot Cota MD     Active Discharge Diagnoses:     Primary Problem  Hyponatremia      [place] Problems    Diagnosis Date Noted    Secondary esophageal varices ([facility] [I85.10] 2022     Priority: Medium    Esophageal polyp [K22.81] 2022     Priority: Medium    Drop in hemoglobin [R71.0] 2022     Priority: Medium    Portal hypertension ([address] [K76.6]      Priority: Medium    Thrombocytopenia ([facility] [address] [facility] [D69.6] 2020    History of hepatitis C [Z86.19] 2017    Hepatic cirrhosis ([facility] [address] [facility] [K74.60] 09/15/2016    Cirrhosis ([address] [K74.60]     Chronic viral hepatitis B without delta agent and without coma ([facility] [address] [B18.1] 2016       Admission Condition:  fair     Discharged Condition: fair    Hospital Stay:     Hospital Course:  Walt Burn is a 57 y.o. male who was admitted for the management of Hyponatremia , presented to ER with Abnormal Lab      No acute episodes overnight. Patient is heme stable with Tmax of 98.4  Patient denies any further episodes of melena. Vitals reviewed. Blood pressure on the lower side (expected to be soft due to cirrhosis). Remains on Midodrine 5 TID. Lasix and Aldactone held. AM CBC and BMP reviewed. Sodium improved and appropriate. Bilirubin trending down. Hemoglobin stable. No leukocytosis. Patient had paracentesis yesterday and had approximately 11 liters removed. Received albumin post procedure. No signs on infection at needle insertion site. Fluid studies reviewed.  Appears to be transudative in nature based on P/S protein ratio of approximately 0.28. Official body fluid culture pending. Urine output 1.3 liters over last 24 hours       Patient is a 50-year-old male with history of cirrhosis secondary to hep C, scented to the ED today due to abnormal lab results. Patient was seen by GI yesterday and had lab work ordered which came back this morning and showed hyponatremia of 119. Patient is complaining of mild symptoms such as nausea and fatigue but otherwise is asymptomatic. Patient denies chest pain, shortness of breath, dizziness, lightheadedness, shortness of breath and vomiting. Patient denies any blood in his stool or any changes in urination. Electrolyte panel done in the ED showed sodium of 119. CBC unremarkable. Patient's serum osmolarity is decreased at 259. Pressure in the ED 96/51. Nephrology was consulted in the ED.              HISTORY: Mr. Dorcas Ortiz is a 57 y.o. male who presents to the [place] [address] Endoscopy unit for upper endoscopy. The patient's clinical history is remarkable for upper GI bleeding. History of esophageal cancer status post surgery. Cirrhosis with portal hypertension. He is currently medically stable and appropriate for the planned procedure.         PREOPERATIVE DIAGNOSIS: Upper GI bleeding. History of esophageal cancer status post resection.   Cirrhosis with portal hypertension.      PROCEDURES:   1) Transoral Upper Endoscopy, diagnostic.      POSTOPERATIVE DIAGNOSIS:   Healing post banding ulcers  Small low risk esophageal varices  POrtal hypertensive gastropathy              Significant therapeutic interventions:     Significant Diagnostic Studies:   Labs / Micro:        ,     Radiology:    IR US GUIDED PARACENTESIS    Result Date: 6/8/2022  PROCEDURE: PARACENTESIS WITHOUT IMAGE GUIDANCE US ABDOMEN LIMITED 6/8/2022 HISTORY: ORDERING SYSTEM PROVIDED HISTORY: ascites TECHNOLOGIST PROVIDED HISTORY: ascites TECHNIQUE: Informed consent was obtained after a detailed explanation of the procedure including risks and benefits. Universal protocol was followed. A limited ultrasound of the abdomen was performed. The right abdomen was prepped and draped in sterile fashion, and local anesthesia was achieved with 1% lidocaine. A 5 Bangladeshi needle sheath was advanced into ascites, and paracentesis was performed. A total of 11.8 L of translucent yellow fluid was aspirated. The patient tolerated the procedure well. FINDINGS: Limited abdominal ultrasound yielding a large amount of ascites. Successful paracentesis. Intravenous albumin administration is recommended. Consultations:    Consults:     Final Specialist Recommendations/Findings:   IP CONSULT TO INTERNAL MEDICINE  IP CONSULT TO NEPHROLOGY  IP CONSULT TO GI      The patient was seen and examined on day of discharge and this discharge summary is in conjunction with any daily progress note from day of discharge. Discharge plan:     Disposition: Home    Physician Follow Up:     Thee Monzon MD  [address]  [address] [place] [address]  [phone]    Schedule an appointment as soon as possible for a visit in 4 weeks  egd hx ev cirrhosis       Requiring Further Evaluation/Follow Up POST HOSPITALIZATION/Incidental Findings:    Diet: cardiac diet    Activity: As tolerated    Instructions to Patient:     Discharge Medications:      Medication List      START taking these medications    pantoprazole 40 MG tablet  Commonly known as: PROTONIX  Take 1 tablet by mouth 2 times daily for 14 days     sucralfate 1 GM tablet  Commonly known as: CARAFATE  Take 1 tablet by mouth 4 times daily for 14 days        CHANGE how you take these medications    furosemide 20 MG tablet  Commonly known as: LASIX  Take 1 tablet by mouth 2 times daily Indications: Full tab TID  What changed:   · medication strength  · Another medication with the same name was removed. Continue taking this medication, and follow the directions you see here.      spironolactone 50 MG tablet  Commonly known as: ALDACTONE  Take 1 tablet by mouth every evening  What changed: Another medication with the same name was removed. Continue taking this medication, and follow the directions you see here. CONTINUE taking these medications    atorvastatin 20 MG tablet  Commonly known as: LIPITOR  Take 1 tablet by mouth nightly     ferrous sulfate 325 (65 Fe) MG tablet  Commonly known as: IRON 325  Take 1 tablet by mouth 2 times daily     FLUoxetine 20 MG capsule  Commonly known as: PROZAC  Take 1 capsule by mouth daily     lactulose 10 GM/15ML solution  Commonly known as: CHRONULAC  take 30 milliliters by mouth three times a day     midodrine 5 MG tablet  Commonly known as: PROAMATINE  Take 1 tablet by mouth 3 times daily as needed (SBP <110)     nadolol 20 MG tablet  Commonly known as: CORGARD     ondansetron 4 MG disintegrating tablet  Commonly known as: ZOFRAN-ODT  dissolve 1 tablet by mouth every 8 hours if needed for nausea and vomiting     vitamin D 25 MCG (1000 UT) Tabs tablet  Commonly known as: CHOLECALCIFEROL        STOP taking these medications    omeprazole 40 MG delayed release capsule  Commonly known as: PRILOSEC           Where to Get Your Medications      These medications were sent to [facility], [address] [place] [address] -  [phone]  [address] [place] [address], [address] [place] [address]    Phone: [phone]   · furosemide 20 MG tablet  · pantoprazole 40 MG tablet  · spironolactone 50 MG tablet  · sucralfate 1 GM tablet         Time Spent on discharge is  35 mins in patient examination, evaluation, counseling as well as medication reconciliation, prescriptions for required medications, discharge plan and follow up. Electronically signed by   Ruthy Zepeda MD  6/9/2022  2:22 PM      Thank you VASU Hoover for the opportunity to be involved in this patient's care.

## 2022-06-09 NOTE — ANESTHESIA POSTPROCEDURE EVALUATION
POST- ANESTHESIA EVALUATION       Pt Name: Antonella Grant  MRN: [id number]  YOB: 1960  Date of evaluation: 6/9/2022  Time:  2:17 PM      BP (!) 102/55   Pulse 63   Temp 98 °F (36.7 °C) (Oral)   Resp 12   Ht 5' 10\" (1.778 m)   Wt 192 lb (87.1 kg)   SpO2 97%   BMI 27.55 kg/m²      Consciousness Level  Awake  Cardiopulmonary Status  Stable  Pain Adequately Treated YES  Nausea / Vomiting  NO  Adequate Hydration  YES  Anesthesia Related Complications NONE      Electronically signed by Nikki Jewell MD on 6/9/2022 at 2:17 PM       Department of Anesthesiology  Postprocedure Note    Patient: Antonella Grant  MRN: [id number]  YOB: 1960  Date of evaluation: 6/9/2022  Time:  2:17 PM     Procedure Summary     Date: 06/09/22 Room / Location: [place] [address] / [facility]    Anesthesia Start: 1139 Anesthesia Stop: 1152    Procedure: EGD ESOPHAGOGASTRODUODENOSCOPY (N/A Esophagus) Diagnosis:       Varices of esophagus determined by endoscopy ([facility] [address]      (ESOPHAGUS VARICES)      (IP 2121)    Surgeons: Jesenia Johnston MD Responsible Provider: Nikki Jewell MD    Anesthesia Type: general ASA Status: 3          Anesthesia Type: general    Yen Phase I: Yen Score: 9    Yen Phase II:      Last vitals: Reviewed and per EMR flowsheets.        Anesthesia Post Evaluation

## 2022-06-09 NOTE — CARE COORDINATION
Continuity of Care Form    Patient Name: Orestes Kimball   :  [birth year]  MRN:  [id number]    Admit date:  2022  Discharge date:  6/10/2022    Code Status Order: Full Code   Advance Directives:   [facility] Documentation       Date/Time Healthcare Directive Type of Healthcare Directive Copy in [facility] [address] Agent's Name Healthcare Agent's Phone Number    22 1051 Yes, patient has an advance directive for healthcare treatment Other (Comment) Other (Comment) -- -- --    22 1121 Yes, patient has an advance directive for healthcare treatment Living will No, copy requested from clinic -- -- --            Admitting Physician:  George Lind MD  PCP: Alvarez Duran    Discharging Nurse: [facility] Unit/Room#: 2121/2121-01  Discharging Unit Phone Number: [phone]    Emergency Contact:   Extended Emergency Contact Information  Primary Emergency Contact: April Ferguson, [address] [place] [address] Phone: [phone]  Work Phone: [phone]  Mobile Phone: [phone]  Relation: Other    Past Surgical History:  Past Surgical History:   Procedure Laterality Date    BUNIONECTOMY      twice on right side    BUNIONECTOMY Left     CARPAL TUNNEL RELEASE Right     COLONOSCOPY      at age 35    COLONOSCOPY  10/05/2016    polyps-pathology tubular adenoma, and abnormal looking mucosa right colon-pathology-tubular adenoma    COLONOSCOPY N/A 3/30/2018    COLONOSCOPY POLYPECTOMY COLD BIOPSY performed by Shelley Griffith MD at [address] [place] [address]  2018    Small polyp in the sigmoid colon and excised with biopsy forceps--tubular adenoma    COLONOSCOPY N/A 2022    COLONOSCOPY POLYPECTOMY performed by Shelley Griffith MD at [facility], COLON, DIAGNOSTIC      EGD    IR PORT PLACEMENT EQUAL OR GREATER THAN 5 YEARS  2021    IR PORT PLACEMENT EQUAL OR GREATER THAN 5 YEARS 2021 [facility] SPECIAL PROCEDURES    KNEE SURGERY Left cyst removed    NASAL SEPTUM SURGERY      NERVE BLOCK Right 11/23/2020    NERVE BLOCK RIGHT CERVICAL STEROID INJECTION  C3-C6 performed by Avinash Bazan MD at [place] [address]  01/04/16    steroid injection C7 T1    OTHER SURGICAL HISTORY  11/21/2016    Bilateral Lumbar CACHORRO L4-L5 injections    OTHER SURGICAL HISTORY  12/19/2016    lumbar steroid injection    OTHER SURGICAL HISTORY  09/28/2018    BILATERAL L5 CACHORRO (N/A Back)    OTHER SURGICAL HISTORY Right 11/23/2020    cervical injection    PAIN MANAGEMENT PROCEDURE Left 7/9/2020    EPIDURAL STEROID INJECTION LEFT L4 L5 performed by Avinash Bazan MD at [address] [place] [address] Left 7/20/2020    LEFT L4 L5 EPIDURAL STEROID INJECTION performed by Avinash Bazan MD at [address] [place] [address] Bilateral 8/17/2020    LUMBAR FACET BILATERAL L2-L5 performed by Avinash Bazan MD at [address] [place] [address] Bilateral 12/7/2020    NERVE BLOCK BILATERAL LUMBAR MEDIAL BRANCH L2-L5 performed by Avinash Bazan MD at [address] AA&/STRD TFRML EPI LUMBAR/SACRAL 1 LEVEL Bilateral 9/6/2018    BILATERAL L5 CACHORRO performed by Avinash Bazan MD at [address] AA&/STRD TFRML EPI LUMBAR/SACRAL 1 LEVEL N/A 9/28/2018    BILATERAL L5 CACHORRO performed by Avinash Bazan MD at [facility] N/CARPAL TUNNEL SURG Right 8/29/2017    CARPAL TUNNEL RELEASE RIGHT performed by Ar Gilbert MD at [facility] N/CARPAL TUNNEL SURG Left 10/31/2017    CARPAL TUNNEL RELEASE performed by Ar Gilbert MD at [facility] [address] 12/29/2020    EGD BIOPSY performed by Agusto Garcia MD at [address] [place] [address] [address] 2/2/2021    EGD BIOPSY and spot marking performed by Jayden Del Valle MD at [address] N/A 2/12/2021    ENDOSCOPIC ULTRASOUND, EGD performed by Warren Francis MD at [facility] Psychophysiologic insomnia F51.04    Cirrhosis (HCC) K74.60    Hepatic cirrhosis (HCC) K74.60    Vertebrogenic low back pain M54.51    DDD (degenerative disc disease), lumbar M51.36    Depression F32. A    Tubular adenoma of colon D12.6    History of colon polyps Z86.010    Gynecomastia, male N58    Lumbar radiculitis M54.16    Lumbar disc herniation M51.26    Tinnitus H93.19    Eustachian tube dysfunction H69.80    Ganglion cyst M67.40    Carpal tunnel syndrome of right wrist G56.01    History of hepatitis C Z86.19    Vitamin D deficiency E55.9    Pure hypercholesterolemia E78.00    Hypokalemia E87.6    Essential hypertension I10    Recurrent major depressive disorder in partial remission (HCC) F33.41    S/P epidural steroid injection Z92.241    Elevated LFTs R79.89    Seasonal allergies J30.2    Lumbar facet arthropathy M47.816    Cervical facet syndrome M47.812    Acute gastrointestinal bleeding K92.2    Thrombocytopenia (HCC) D69.6    Hepatitis C virus infection resolved after antiviral drug therapy Z86.19    Gastrointestinal hemorrhage with melena K92.1    Alcohol abuse F10.10    Altered mental status R41.82    Hypocalcemia E83.51    Hypophosphatemia E83.39    Malignant neoplasm of lower third of esophagus (HCC) C15.5    COVID-19 U07.1    Anxiety F41.9    Current smoker F17.200    Severe comorbid illness R69    Gait instability R26.81    Abnormal findings on diagnostic imaging of spine R93.7    Cervical spinal stenosis M48.02    Spinal stenosis of lumbar region with neurogenic claudication M48.062    Low hemoglobin D64.9    Symptomatic anemia, microcytic, acute D64.9    Hypotension I95.9    Former smoker, 50+ pack years, quit 2016 Z87.891    HLD (hyperlipidemia) E78.5    Esophageal adenocarcinoma (HCC) C15.9    Anemia D64.9    Acute kidney injury ([address] N17.9    Ascites R18.8    Shortness of breath R06.02    Drop in hemoglobin R71.0    Portal hypertension ([facility] K76.6    Secondary esophageal varices (HCC) I85.10    Esophageal polyp K22.81       Isolation/Infection:   Isolation            No Isolation          Patient Infection Status       Infection Onset Added Last Indicated Last Indicated By Review Planned Expiration Resolved Resolved By    None active    Resolved    COVID-19 (Rule Out) 22 COVID-19, Rapid (Ordered)   22 Rule-Out Test Resulted    COVID-19 (Rule Out) 22 COVID-19 & Influenza Combo (Ordered)   22 Rule-Out Test Resulted    COVID-19 21 COVID-19   21     COVID-19 (Rule Out) 21 COVID-19 (Ordered)   21 Rule-Out Test Resulted    COVID-19 (Rule Out) 20 COVID-19 (Ordered)   20 Rule-Out Test Resulted    COVID-19 (Rule Out) 20 COVID-19 (Ordered)   20 Rule-Out Test Resulted            Nurse Assessment:  Last Vital Signs: BP (!) 102/55   Pulse 63   Temp 98 °F (36.7 °C) (Oral)   Resp 12   Ht 5' 10\" (1.778 m)   Wt 192 lb (87.1 kg)   SpO2 97%   BMI 27.55 kg/m²     Last documented pain score (0-10 scale): Pain Level: 2  Last Weight:   Wt Readings from Last 1 Encounters:   22 192 lb (87.1 kg)     Mental Status:  oriented    IV Access:  - None    Nursing Mobility/ADLs:  Walking   Independent  Transfer  Independent  Bathing  Independent  Dressing  Independent  Toileting  Independent  Feeding  Independent  Med Admin  Independent  Med Delivery   none    Wound Care Documentation and Therapy:        Elimination:  Continence:   · Bowel: Yes  · Bladder: Yes  Urinary Catheter: None   Colostomy/Ileostomy/Ileal Conduit: No       Date of Last BM: 6/10    Intake/Output Summary (Last 24 hours) at 2022 1415  Last data filed at 2022 1212  Gross per 24 hour   Intake 948 ml   Output 1000 ml   Net -52 ml     I/O last 3 completed shifts:   In:  [P.O.:1961; I.V.:98]  Out: [address] [Urine:2650; VTLJU:22575]    Safety Concerns:     None    Impairments/Disabilities:      None    Nutrition Therapy:  Current Nutrition Therapy:   - Oral Diet:  General    Routes of Feeding: Oral  Liquids: No Restrictions  Daily Fluid Restriction: yes - amount 1500mL  Last Modified Barium Swallow with Video (Video Swallowing Test): not done    Treatments at the Time of Hospital Discharge:   Respiratory Treatments: none  Oxygen Therapy:  is not on home oxygen therapy. Ventilator:    - No ventilator support    Rehab Therapies: Physical Therapy and Occupational Therapy  Weight Bearing Status/Restrictions: No weight bearing restrictions  Other Medical Equipment (for information only, NOT a DME order):  cane and walker  Other Treatments: Skilled Nursing Assessment Per Protocol. Medication Education & Monitoring. Patient's personal belongings (please select all that are sent with patient):  None    RN SIGNATURE:  Electronically signed by Joey Mendez RN on 6/9/22 at 4:09 PM EDT    CASE MANAGEMENT/SOCIAL WORK SECTION    Inpatient Status Date:     Readmission Risk Assessment Score:  Readmission Risk              Risk of Unplanned Readmission:  40           Discharging to Facility/ Agency   · [facility]  · [address] [facility] [address]  · [address] [address]  · Phone [phone]  · Fax  [phone]     Dialysis Facility (if applicable)   · Name:  · Address:  · Dialysis Schedule:  · Phone:  · Fax:    / signature: Electronically signed by Ridge Saunders RN on 6/9/22 at 4:05 PM EDT    PHYSICIAN SECTION    Prognosis: Good    Condition at Discharge: Stable    Rehab Potential (if transferring to Rehab): Good    Recommended Labs or Other Treatments After Discharge:     Physician Certification: I certify the above information and transfer of Ashley Hammond  is necessary for the continuing treatment of the diagnosis listed and that he requires [address] for less 30 days.      Update Admission H&P: No change in H&P    PHYSICIAN SIGNATURE:  Electronically signed by Eduin Reese MD on 6/9/22 at 2:15 PM EDT    Medication List      START taking these medications    START taking these medications   pantoprazole 40 MG tablet  Commonly known as: PROTONIX  Take 1 tablet by mouth 2 times daily for 14 days   sucralfate 1 GM tablet  Commonly known as: CARAFATE  Take 1 tablet by mouth 4 times daily for 14 days     CHANGE how you take these medications    CHANGE how you take these medications   furosemide 20 MG tablet  Commonly known as: LASIX  Take 1 tablet by mouth 2 times daily Indications: Full tab TID  What changed:   0 medication strength  0 Another medication with the same name was removed. Continue taking this medication, and follow the directions you see here. spironolactone 50 MG tablet  Commonly known as: ALDACTONE  Take 1 tablet by mouth every evening  What changed: Another medication with the same name was removed. Continue taking this medication, and follow the directions you see here.      CONTINUE taking these medications    CONTINUE taking these medications   atorvastatin 20 MG tablet  Commonly known as: LIPITOR  Take 1 tablet by mouth nightly   ferrous sulfate 325 (65 Fe) MG tablet  Commonly known as: IRON 325  Take 1 tablet by mouth 2 times daily   FLUoxetine 20 MG capsule  Commonly known as: PROZAC  Take 1 capsule by mouth daily   lactulose 10 GM/15ML solution  Commonly known as: CHRONULAC  take 30 milliliters by mouth three times a day   midodrine 5 MG tablet  Commonly known as: PROAMATINE  Take 1 tablet by mouth 3 times daily as needed (SBP <110)   nadolol 20 MG tablet  Commonly known as: CORGARD  take 1 tablet by mouth once daily   ondansetron 4 MG disintegrating tablet  Commonly known as: ZOFRAN-ODT  dissolve 1 tablet by mouth every 8 hours if needed for nausea and vomiting   vitamin D 25 MCG (1000 UT) Tabs tablet  Commonly known as: CHOLECALCIFEROL  Take 1,000 Units by mouth daily     STOP taking these medications    STOP taking these medications   omeprazole 40 MG delayed release capsule  Commonly known as: PRILOSEC   Where to Get Your Medications      These medications were sent to [facility], [facility]  [address], [address] [facility]  Phone: [phone]        furosemide 20 MG tablet         pantoprazole 40 MG tablet         spironolactone 50 MG tablet         sucralfate 1 GM tablet             Printing Report    Report Name Print   Discharge Meds Print

## 2022-06-09 NOTE — PLAN OF CARE
Problem: Safety - Adult  Goal: Free from fall injury  6/9/2022 0317 by Derek Watson RN  Outcome: Progressing  Note: No falls noted this shift. Patient ambulates with x1 staff assistance without difficulty. Bed kept in low position. Safe environment maintained. Bedside table & call light in reach. Uses call light appropriately when needing assistance. Problem: Pain  Goal: Verbalizes/displays adequate comfort level or baseline comfort level  6/9/2022 0317 by Derek Watson RN  Outcome: Progressing  Note: Pt medicated with pain medication prn. Assessed all pain characteristics including level, type, location, frequency, and onset. Non-pharmacologic interventions offered to pt as well. Pt states pain is tolerable at this time. Will continue to monitor.        Problem: Discharge Planning  Goal: Discharge to home or other facility with appropriate resources  6/9/2022 0317 by Derek Watson RN  Outcome: Progressing     Problem: Chronic Conditions and Co-morbidities  Goal: Patient's chronic conditions and co-morbidity symptoms are monitored and maintained or improved  6/9/2022 0317 by Derek Watson RN  Outcome: Progressing

## 2022-06-09 NOTE — DISCHARGE INSTR - COC
Continuity of Care Form    Patient Name: Ela Garcia   :  [birth year]  MRN:  [id number]    Admit date:  2022  Discharge date:  6/10/2022    Code Status Order: Full Code   Advance Directives:   [facility] Documentation       Date/Time Healthcare Directive Type of Healthcare Directive Copy in [facility] [address] Agent's Name Healthcare Agent's Phone Number    22 1055 Yes, patient has an advance directive for healthcare treatment Other (Comment) Other (Comment) -- -- --    22 1121 Yes, patient has an advance directive for healthcare treatment Living will No, copy requested from clinic -- -- --            Admitting Physician:  Avinash Kendall MD  PCP: Alvarez Gomez    Discharging Nurse: [facility] Unit/Room#: 2121/2121-01  Discharging Unit Phone Number: [phone]    Emergency Contact:   Extended Emergency Contact Information  Primary Emergency Contact: Tavon Dixon, [address] [place] [address] Phone: [phone]  Work Phone: [phone]  Mobile Phone: [phone]  Relation: Other    Past Surgical History:  Past Surgical History:   Procedure Laterality Date    BUNIONECTOMY      twice on right side    BUNIONECTOMY Left     CARPAL TUNNEL RELEASE Right     COLONOSCOPY      at age 35    COLONOSCOPY  10/05/2016    polyps-pathology tubular adenoma, and abnormal looking mucosa right colon-pathology-tubular adenoma    COLONOSCOPY N/A 3/30/2018    COLONOSCOPY POLYPECTOMY COLD BIOPSY performed by Orlando Freeman MD at [address] [place] [address]  2018    Small polyp in the sigmoid colon and excised with biopsy forceps--tubular adenoma    COLONOSCOPY N/A 2022    COLONOSCOPY POLYPECTOMY performed by Orlando Freeman MD at [address] [place] [address], COLON, DIAGNOSTIC      EGD    IR PORT PLACEMENT EQUAL OR GREATER THAN 5 YEARS  2021    IR PORT PLACEMENT EQUAL OR GREATER THAN 5 YEARS 2021 [facility] PROCEDURES    KNEE SURGERY Left     cyst removed NASAL SEPTUM SURGERY      NERVE BLOCK Right 11/23/2020    NERVE BLOCK RIGHT CERVICAL STEROID INJECTION  C3-C6 performed by Jo Jorge MD at [facility]  01/04/16    steroid injection C7 T1    OTHER SURGICAL HISTORY  11/21/2016    Bilateral Lumbar CACHORRO L4-L5 injections    OTHER SURGICAL HISTORY  12/19/2016    lumbar steroid injection    OTHER SURGICAL HISTORY  09/28/2018    BILATERAL L5 CACHORRO (N/A Back)    OTHER SURGICAL HISTORY Right 11/23/2020    cervical injection    PAIN MANAGEMENT PROCEDURE Left 7/9/2020    EPIDURAL STEROID INJECTION LEFT L4 L5 performed by Jo Jorge MD at [facility] Left 7/20/2020    LEFT L4 L5 EPIDURAL STEROID INJECTION performed by Jo Jorge MD at [facility] Bilateral 8/17/2020    LUMBAR FACET BILATERAL L2-L5 performed by Jo Jorge MD at [facility] Bilateral 12/7/2020    NERVE BLOCK BILATERAL LUMBAR MEDIAL BRANCH L2-L5 performed by Jo Jorge MD at [facility] AA&/STRD TFRML EPI LUMBAR/SACRAL 1 LEVEL Bilateral 9/6/2018    BILATERAL L5 CACHORRO performed by Jo Jorge MD at [facility] AA&/STRD TFRML EPI LUMBAR/SACRAL 1 LEVEL N/A 9/28/2018    BILATERAL L5 CACHORRO performed by Jo Jorge MD at [address] N/CARPAL TUNNEL SURG Right 8/29/2017    CARPAL TUNNEL RELEASE RIGHT performed by Jose Magana MD at [address] N/CARPAL TUNNEL SURG Left 10/31/2017    CARPAL TUNNEL RELEASE performed by Jose Magana MD at [address] [place] [address] 12/29/2020    EGD BIOPSY performed by Darlene Lazar MD at [address] [place] [address] 2/2/2021    EGD BIOPSY and spot marking performed by Laurita Kendall MD at [address] [place] [address] N/A 2/12/2021    ENDOSCOPIC ULTRASOUND, EGD performed by Vilma Hardy MD at [place] [address]  2/12/2021 EGD DIAGNOSTIC ONLY performed by Howard Holloway MD at [address] [place] [address] 8/31/2021    EGD BIOPSY performed by Marylou Lovelace MD at [address] [place] [address] 1/21/2022    EGD BIOPSY performed by Marylou Lovelace MD at [address] [place] [address] N/A 4/15/2022    EGD ESOPHAGOGASTRODUODENOSCOPY performed by Mayank Zamorano MD at [facility] N/A 6/6/2022    EGD BAND LIGATION performed by Ana Ivory MD at [address] [place] [address] N/A 6/9/2022    EGD ESOPHAGOGASTRODUODENOSCOPY performed by Ana Ivory MD at [address] [place] [address]         Immunization History:   Immunization History   Administered Date(s) Administered    COVID-19, Annie Nair, Primary or Immunocompromised, PF, 100mcg/0.5mL 05/20/2021, 06/22/2021    Hepatitis A 09/20/2016, 03/29/2017    Hepatitis B (Recombivax HB) 09/20/2016, 10/19/2016, 03/29/2017    Influenza 11/29/2012    Influenza, Marta Mcclure, IM, (6 mo and older Fluzone, Flulaval, Fluarix and 3 yrs and older Afluria) 09/13/2017, 01/24/2019    Influenza, Marta Mcclure IM, PF (6 mo and older Fluzone, Flulaval, Fluarix, and 3 yrs and older Afluria) 09/13/2016, 12/30/2020, 12/23/2021    Pneumococcal Polysaccharide ([id number]) 11/29/2012, 07/25/2016    Tdap (Boostrix, Adacel) 04/06/2017       Active Problems:  Patient Active Problem List   Diagnosis Code    Back pain, chronic M54.9, G89.29    Hearing difficulty H91.90    GERD (gastroesophageal reflux disease) K21.9    Cervical radicular pain M54.12    Alcohol withdrawal syndrome without complication (HCC) A52.084    Hypomagnesemia E83.42    Chronic viral hepatitis B without delta agent and without coma (HCC) B18.1    Calculus of gallbladder without cholecystitis K80.20    Hep C w/o coma, chronic (HCC) B18.2    Fatty liver K76.0    Psychophysiologic insomnia F51.04    Cirrhosis (HCC) K74.60    Hepatic cirrhosis ([facility] [address] K74.60    Vertebrogenic low back pain M54.51    DDD (degenerative disc disease), lumbar M51.36    Depression F32. A    Tubular adenoma of colon D12.6    History of colon polyps Z86.010    Gynecomastia, male N62    Lumbar radiculitis M54.16    Lumbar disc herniation M51.26    Tinnitus H93.19    Eustachian tube dysfunction H69.80    Ganglion cyst M67.40    Carpal tunnel syndrome of right wrist G56.01    History of hepatitis C Z86.19    Vitamin D deficiency E55.9    Pure hypercholesterolemia E78.00    Hypokalemia E87.6    Essential hypertension I10    Recurrent major depressive disorder in partial remission (HCC) F33.41    S/P epidural steroid injection Z92.241    Elevated LFTs R79.89    Seasonal allergies J30.2    Lumbar facet arthropathy M47.816    Cervical facet syndrome M47.812    Acute gastrointestinal bleeding K92.2    Thrombocytopenia (HCC) D69.6    Hepatitis C virus infection resolved after antiviral drug therapy Z86.19    Gastrointestinal hemorrhage with melena K92.1    Alcohol abuse F10.10    Altered mental status R41.82    Hypocalcemia E83.51    Hypophosphatemia E83.39    Malignant neoplasm of lower third of esophagus (HCC) C15.5    COVID-19 U07.1    Anxiety F41.9    Current smoker F17.200    Severe comorbid illness R69    Gait instability R26.81    Abnormal findings on diagnostic imaging of spine R93.7    Cervical spinal stenosis M48.02    Spinal stenosis of lumbar region with neurogenic claudication M48.062    Low hemoglobin D64.9    Symptomatic anemia, microcytic, acute D64.9    Hypotension I95.9    Former smoker, 50+ pack years, quit 2016 Z87.891    HLD (hyperlipidemia) E78.5    Esophageal adenocarcinoma (HCC) C15.9    Anemia D64.9    Acute kidney injury ([facility] [address] N17.9    Ascites R18.8    Shortness of breath R06.02    Drop in hemoglobin R71.0    Portal hypertension (HCC) K76.6    Secondary esophageal varices (HCC) I85.10    Esophageal polyp K22.81       Isolation/Infection:   Isolation            No Isolation          Patient Infection Status       Infection Onset Added Last Indicated Last Indicated By Review Planned Expiration Resolved Resolved By    None active    Resolved    COVID-19 (Rule Out) 22 COVID-19, Rapid (Ordered)   22 Rule-Out Test Resulted    COVID-19 (Rule Out) 22 COVID-19 & Influenza Combo (Ordered)   22 Rule-Out Test Resulted    COVID-19 21 COVID-19   21     COVID-19 (Rule Out) 21 COVID-19 (Ordered)   21 Rule-Out Test Resulted    COVID-19 (Rule Out) 20 COVID-19 (Ordered)   20 Rule-Out Test Resulted    COVID-19 (Rule Out) 20 COVID-19 (Ordered)   20 Rule-Out Test Resulted            Nurse Assessment:  Last Vital Signs: BP (!) 102/55   Pulse 63   Temp 98 °F (36.7 °C) (Oral)   Resp 12   Ht 5' 10\" (1.778 m)   Wt 192 lb (87.1 kg)   SpO2 97%   BMI 27.55 kg/m²     Last documented pain score (0-10 scale): Pain Level: 2  Last Weight:   Wt Readings from Last 1 Encounters:   22 192 lb (87.1 kg)     Mental Status:  oriented    IV Access:  - None    Nursing Mobility/ADLs:  Walking   Independent  Transfer  Independent  Bathing  Independent  Dressing  Independent  Toileting  Independent  Feeding  Independent  Med Admin  Independent  Med Delivery   none    Wound Care Documentation and Therapy:        Elimination:  Continence: Bowel: Yes  Bladder: Yes  Urinary Catheter: None   Colostomy/Ileostomy/Ileal Conduit: No       Date of Last BM: 6/10    Intake/Output Summary (Last 24 hours) at 2022 1415  Last data filed at 2022 1212  Gross per 24 hour   Intake 948 ml   Output 1000 ml   Net -52 ml     I/O last 3 completed shifts: In:  [P.O.:1960; I.V.:98]  Out: 87406 [Urine:2650;  Other:29751]    Safety Concerns:     None    Impairments/Disabilities:      None    Nutrition Therapy:  Current Nutrition Therapy: - Oral Diet:  General    Routes of Feeding: Oral  Liquids: No Restrictions  Daily Fluid Restriction: yes - amount 1500mL  Last Modified Barium Swallow with Video (Video Swallowing Test): not done    Treatments at the Time of Hospital Discharge:   Respiratory Treatments: none  Oxygen Therapy:  is not on home oxygen therapy. Ventilator:    - No ventilator support    Rehab Therapies: Physical Therapy and Occupational Therapy  Weight Bearing Status/Restrictions: No weight bearing restrictions  Other Medical Equipment (for information only, NOT a DME order):  cane and walker  Other Treatments: Skilled Nursing Assessment Per Protocol. Medication Education & Monitoring. Patient's personal belongings (please select all that are sent with patient):  None    RN SIGNATURE:  Electronically signed by Lauren Riley RN on 6/9/22 at 4:09 PM EDT    CASE MANAGEMENT/SOCIAL WORK SECTION    Inpatient Status Date:     Readmission Risk Assessment Score:  Readmission Risk              Risk of Unplanned Readmission:  40           Discharging to Facility/ [address]  [address] [address]  Phone [phone]  Fax  [phone]     Dialysis Facility (if applicable)   Name:  Address:  Dialysis Schedule:  Phone:  Fax:    / signature: Electronically signed by Chay Gallegos RN on 6/9/22 at 4:05 PM EDT    PHYSICIAN SECTION    Prognosis: Good    Condition at Discharge: Stable    Rehab Potential (if transferring to Rehab): Good    Recommended Labs or Other Treatments After Discharge:     Physician Certification: I certify the above information and transfer of Petty Casarez  is necessary for the continuing treatment of the diagnosis listed and that he requires [address] for less 30 days.      Update Admission H&P: No change in H&P    PHYSICIAN SIGNATURE:  Electronically signed by Jennifer Severino MD on 6/9/22 at 2:15 PM EDT

## 2022-06-09 NOTE — PROGRESS NOTES
Physician Progress Note      Sil Nelson  CSN #:                  [id number]  :                       1960  ADMIT DATE:       2022 10:24 AM  DISCH DATE:  RESPONDING  PROVIDER #:        Marisol Menchaca MD          QUERY TEXT:    Pt admitted with hyponatremia  and has anemia documented. Admission Hgb 8.2   -> 5.9 on 6/3/22    If possible, please document in progress notes and discharge summary further   specificity regarding the acuity and type of anemia:    The medical record reflects the following:  Risk Factors: hepatitis C, Cirrhosis, EtOH abuse, esophageal cancer,   esophageal varices, HTN. Clinical Indicators:   Admission Hgb 8.2 -> 5.9 on 6/3/22;  22 stool for   occult blood positive  Treatment: RBC transfusion on 6/3/22 and 22,  EGD/ esophageal band   ligation,PPI drip, octreotide drip, antibiotics. Harman Phalen, RN, [address] [place] [address]  Clinical   [phone]  . Options provided:  -- Anemia due to acute blood loss  -- Anemia due to chronic blood loss  -- Anemia due to acute on chronic blood loss  -- Anemia due to iron deficiency  -- Dilutional anemia  -- Other - I will add my own diagnosis  -- Disagree - Not applicable / Not valid  -- Disagree - Clinically unable to determine / Unknown  -- Refer to Clinical Documentation Reviewer    PROVIDER RESPONSE TEXT:    This patient has chronic blood loss anemia.   From esophageal disease , varices , gerd    Query created by: Maureen Dykes on 2022 8:30 PM      Electronically signed by:  Marisol Menchaca MD 2022 3:37 PM

## 2022-06-09 NOTE — ANESTHESIA PRE PROCEDURE
Department of Anesthesiology  Preprocedure Note       Name:  Felice Veliz   Age:  57 y.o.  :  1960                                          MRN:  [id number]         Date:  2022      Surgeon: Kelly Demarco):  Hiwot Roberson MD    Procedure: Procedure(s):  EGD ESOPHAGOGASTRODUODENOSCOPY    Medications prior to admission:   Prior to Admission medications    Medication Sig Start Date End Date Taking?  Authorizing Provider   furosemide (LASIX) 40 MG tablet Take 40 mg by mouth 3 times daily   Yes Historical Provider, MD   spironolactone (ALDACTONE) 100 MG tablet Take 200 mg by mouth 3 times daily    Yes Historical Provider, MD   vitamin D (CHOLECALCIFEROL) 25 MCG (1000 UT) TABS tablet Take 1,000 Units by mouth daily   Yes Historical Provider, MD   ondansetron (ZOFRAN-ODT) 4 MG disintegrating tablet dissolve 1 tablet by mouth every 8 hours if needed for nausea and vomiting 22   Valri Boehringer, PA   lactulose [facility]) 10 GM/15ML solution take 30 milliliters by mouth three times a day 22   Valri Boehringer, PA   nadolol (CORGARD) 20 MG tablet take 1 tablet by mouth once daily 22   Historical Provider, MD   FLUoxetine (PROZAC) 20 MG capsule Take 1 capsule by mouth daily 22   Anthony Grigsby MD   atorvastatin (LIPITOR) 20 MG tablet Take 1 tablet by mouth nightly 22   Anthony Grigsby MD   midodrine (PROAMATINE) 5 MG tablet Take 1 tablet by mouth 3 times daily as needed (SBP <110) 22   Anthony Grigsby MD   ferrous sulfate (IRON 325) 325 (65 Fe) MG tablet Take 1 tablet by mouth 2 times daily 22   Valri Boehringer, PA   omeprazole (PRILOSEC) 40 MG delayed release capsule take 1 capsule by mouth EVERY MORNING BEFORE BREAKFAST 21   Brandie Edge MD       Current medications:    Current Facility-Administered Medications   Medication Dose Route Frequency Provider Last Rate Last Admin    0.9 % sodium chloride infusion   IntraVENous PRN Hiwot Roberson MD        0.9 % sodium chloride infusion IntraVENous PRN Marsha Otoole MD        sodium chloride flush 0.9 % injection 5-40 mL  5-40 mL IntraVENous 2 times per day Yudi Redman MD   10 mL at 06/08/22 2131    sodium chloride flush 0.9 % injection 5-40 mL  5-40 mL IntraVENous PRN Gail Bonilla MD        0.9 % sodium chloride infusion   IntraVENous PRN Yudi Redman MD        meperidine (DEMEROL) injection 12.5 mg  12.5 mg IntraVENous Q5 Min PRN Gail Bonilla MD        fentaNYL (SUBLIMAZE) injection 25 mcg  25 mcg IntraVENous Q5 Min PRN Gail Bonilla MD        fentaNYL (SUBLIMAZE) injection 50 mcg  50 mcg IntraVENous Q5 Min PRN Gail Bonilla MD        sucralfate (CARAFATE) tablet 1 g  1 g Oral 4 times per day Marsha Otoole MD   1 g at 06/09/22 0039    pantoprazole (PROTONIX) 40 mg in sodium chloride (PF) 10 mL injection  40 mg IntraVENous Q12H Bi Dawkins MD   40 mg at 06/09/22 0610    iron sucrose (VENOFER) 200 mg in sodium chloride 0.9 % 100 mL IVPB  200 mg IntraVENous Q24H Marsha Otoole MD   Stopped at 06/08/22 1623    octreotide (SANDOSTATIN) 500 mcg in sodium chloride 0.9 % 100 mL infusion  50 mcg/hr IntraVENous Continuous Bi Dawkins MD 10 mL/hr at 06/09/22 0627 50 mcg/hr at 06/09/22 0627    atorvastatin (LIPITOR) tablet 20 mg  20 mg Oral Nightly Marsha Otoole MD   20 mg at 06/08/22 2138    [Held by provider] furosemide (LASIX) tablet 20 mg  20 mg Oral BID Sundeep Hale MD   20 mg at 06/05/22 0846    lactulose (CHRONULAC) 10 GM/15ML solution 10 g  10 g Oral TID Marsha Otoole MD   10 g at 06/08/22 2138    midodrine (PROAMATINE) tablet 5 mg  5 mg Oral TID PRN Marsha Otoole MD   5 mg at 06/08/22 1811    nadolol (CORGARD) tablet 20 mg  20 mg Oral Daily Bi Dawkins MD   20 mg at 06/07/22 0848    [Held by provider] spironolactone (ALDACTONE) tablet 50 mg  50 mg Oral QPM Koko Gonzalez MD        sodium chloride flush 0.9 % injection 5-40 mL  5-40 mL IntraVENous 2 times per day Marsha Otoole MD   10 mL at 06/08/22 2139    sodium chloride flush 0.9 % injection 5-40 mL  5-40 mL IntraVENous PRN Bi Dawkins MD        0.9 % sodium chloride infusion   IntraVENous PRN Heloise Peabody, MD        [Held by provider] enoxaparin (LOVENOX) injection 40 mg  40 mg SubCUTAneous Daily Koko Flowers MD   40 mg at 06/02/22 1821    ondansetron (ZOFRAN-ODT) disintegrating tablet 4 mg  4 mg Oral Q8H PRN Heloise Peabody, MD   4 mg at 06/06/22 2101    Or    ondansetron (ZOFRAN) injection 4 mg  4 mg IntraVENous Q6H PRN Bi Dawkins MD        polyethylene glycol (GLYCOLAX) packet 17 g  17 g Oral Daily PRN Heloise Peabody, MD           Allergies:  No Known Allergies    Problem List:    Patient Active Problem List   Diagnosis Code    Back pain, chronic M54.9, G89.29    Hearing difficulty H91.90    GERD (gastroesophageal reflux disease) K21.9    Cervical radicular pain M54.12    Alcohol withdrawal syndrome without complication (HCC) S70.583    Hypomagnesemia E83.42    Chronic viral hepatitis B without delta agent and without coma (HCC) B18.1    Calculus of gallbladder without cholecystitis K80.20    Hep C w/o coma, chronic (HCC) B18.2    Fatty liver K76.0    Psychophysiologic insomnia F51.04    Cirrhosis (HCC) K74.60    Hepatic cirrhosis (HCC) K74.60    Vertebrogenic low back pain M54.51    DDD (degenerative disc disease), lumbar M51.36    Depression F32. A    Tubular adenoma of colon D12.6    History of colon polyps Z86.010    Gynecomastia, male N58    Lumbar radiculitis M54.16    Lumbar disc herniation M51.26    Tinnitus H93.19    Eustachian tube dysfunction H69.80    Ganglion cyst M67.40    Carpal tunnel syndrome of right wrist G56.01    History of hepatitis C Z86.19    Vitamin D deficiency E55.9    Pure hypercholesterolemia E78.00    Hypokalemia E87.6    Essential hypertension I10    Recurrent major depressive disorder in partial remission (HCC) F33.41    S/P epidural steroid injection Z92.241    Elevated LFTs R79.89    Seasonal allergies J30.2    Lumbar facet arthropathy M47.816    Cervical facet syndrome M47.812    Acute gastrointestinal bleeding K92.2    Thrombocytopenia (HCC) D69.6    Hepatitis C virus infection resolved after antiviral drug therapy Z86.19    Gastrointestinal hemorrhage with melena K92.1    Alcohol abuse F10.10    Altered mental status R41.82    Hypocalcemia E83.51    Hypophosphatemia E83.39    Malignant neoplasm of lower third of esophagus (HCC) C15.5    COVID-19 U07.1    Anxiety F41.9    Current smoker F17.200    Severe comorbid illness R69    Gait instability R26.81    Abnormal findings on diagnostic imaging of spine R93.7    Cervical spinal stenosis M48.02    Spinal stenosis of lumbar region with neurogenic claudication M48.062    Low hemoglobin D64.9    Symptomatic anemia, microcytic, acute D64.9    Hypotension I95.9    Former smoker, 50+ pack years, quit 2016 Z87.891    HLD (hyperlipidemia) E78.5    Esophageal adenocarcinoma (HCC) C15.9    Anemia D64.9    Acute kidney injury ([address] N17.9    Ascites R18.8    Shortness of breath R06.02    Drop in hemoglobin R71.0    Portal hypertension (HCC) K76.6    Secondary esophageal varices (HCC) I85.10    Esophageal polyp K22.81       Past Medical History:        Diagnosis Date    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)     Anxiety     Arthritis     Back pain, chronic     dr. Nelly Wei, orthopedic, every 3-4 months, gets steroid injection    Lezama esophagus     BPH (benign prostatic hypertrophy)     Cholelithiasis     Cirrhosis ([address]     COVID-19 12/2020    pt reports he had a positive test while at [facility] in 2020, was asymptomatic    COVID-19 vaccine series completed 5/20/2021, 6/22/2021    Moderna 5/20/2021, 6/22/2021    DDD (degenerative disc disease), lumbar     Depression     Esophageal cancer ([address]     INVASIVE ADENOCARCINOMA ARISING IN TUBULAR ADENOMA WITH HIGH GRADE DYSPLASIA, ASSOCIATED WITH FOCAL INTESTINAL METAPLASIA     Esophageal varices (HCC)     Fatty liver     GERD (gastroesophageal reflux disease)     Hep C w/o coma, chronic (HCC)     History of alcohol abuse     6-12 beers a day; quit drinking July 2016    History of blood transfusion     History of colon polyps 2016    History of tobacco abuse     [place] (hard of hearing)     Hyperlipidemia     Hypertension     Port-A-Cath in place     right upper chest    Sciatica     Spinal stenosis     Stomach ulcer     hx of    Tubular adenoma of colon 2016, 2018    Vitamin D deficiency     Wears glasses        Past Surgical History:        Procedure Laterality Date    BUNIONECTOMY      twice on right side    BUNIONECTOMY Left     CARPAL TUNNEL RELEASE Right     COLONOSCOPY      at age 35    COLONOSCOPY  10/05/2016    polyps-pathology tubular adenoma, and abnormal looking mucosa right colon-pathology-tubular adenoma    COLONOSCOPY N/A 3/30/2018    COLONOSCOPY POLYPECTOMY COLD BIOPSY performed by Shelley Griffith MD at [address] [facility]  03/30/2018    Small polyp in the sigmoid colon and excised with biopsy forceps--tubular adenoma    COLONOSCOPY N/A 4/16/2022    COLONOSCOPY POLYPECTOMY performed by Shelley Griffith MD at [address], COLON, DIAGNOSTIC      EGD    IR PORT PLACEMENT EQUAL OR GREATER THAN 5 YEARS  4/19/2021    IR PORT PLACEMENT EQUAL OR GREATER THAN 5 YEARS 4/19/2021 [facility]    KNEE SURGERY Left     cyst removed    NASAL SEPTUM SURGERY      NERVE BLOCK Right 11/23/2020    NERVE BLOCK RIGHT CERVICAL STEROID INJECTION  C3-C6 performed by Melecio Delaney MD at [address]  01/04/16    steroid injection C7 T1    OTHER SURGICAL HISTORY  11/21/2016    Bilateral Lumbar CACHORRO L4-L5 injections    OTHER SURGICAL HISTORY  12/19/2016    lumbar steroid injection    OTHER SURGICAL HISTORY  09/28/2018    BILATERAL L5 CACHORRO (N/A Back)    OTHER SURGICAL HISTORY Right 11/23/2020    cervical injection    PAIN MANAGEMENT PROCEDURE Left 7/9/2020    EPIDURAL STEROID INJECTION LEFT L4 L5 performed by Veronica Bonner MD at [address] Left 7/20/2020    LEFT L4 L5 EPIDURAL STEROID INJECTION performed by Veronica Bonner MD at [facility] [address] Bilateral 8/17/2020    LUMBAR FACET BILATERAL L2-L5 performed by Veronica Bonner MD at [facility] [address] Bilateral 12/7/2020    NERVE BLOCK BILATERAL LUMBAR MEDIAL BRANCH L2-L5 performed by Veronica Bonner MD at [address] AA&/STRD TFRML EPI LUMBAR/SACRAL 1 LEVEL Bilateral 9/6/2018    BILATERAL L5 CACHORRO performed by Veronica Bonner MD at [address] AA&/STRD TFRML EPI LUMBAR/SACRAL 1 LEVEL N/A 9/28/2018    BILATERAL L5 CACHORRO performed by Veronica Bonner MD at [address] [facility] N/CARPAL TUNNEL SURG Right 8/29/2017    CARPAL TUNNEL RELEASE RIGHT performed by Thais Altamirano MD at [address] [facility] N/CARPAL TUNNEL SURG Left 10/31/2017    CARPAL TUNNEL RELEASE [facility] 12/29/2020    EGD BIOPSY performed by Ellie Cuevas MD at [place] [address] N/A 2/2/2021    EGD BIOPSY and spot marking performed by Yazmin Hoover MD at [place] [address] 2/12/2021    ENDOSCOPIC ULTRASOUND, EGD performed by Akira Verma MD at [address] [place] [address]  2/12/2021    EGD DIAGNOSTIC ONLY performed by Akira Verma MD at [address] [place] [address] N/A 8/31/2021    EGD BIOPSY performed by Yazmin Hoover MD at [place] [address] 1/21/2022    EGD BIOPSY performed by Yazmin Hoover MD at [place] [address] 4/15/2022    EGD ESOPHAGOGASTRODUODENOSCOPY performed by Ellie Cuevas MD at [place] [address] GASTROINTESTINAL ENDOSCOPY N/A 2022    EGD BAND LIGATION performed by Erinn Chilel MD at [facility]         Social History:    Social History     Tobacco Use    Smoking status: Former Smoker     Packs/day: 1.00     Years: 45.00     Pack years: 45.00     Quit date: 2017     Years since quittin.3    Smokeless tobacco: Never Used   Substance Use Topics    Alcohol use: Not Currently     Comment: quit 2019                                Counseling given: Not Answered      Vital Signs (Current):   Vitals:    22 1830 22 1948 22 2345 22 0648   BP: (!) 95/55  99/63 98/63   Pulse: 66 66 65 68   Resp:    Temp: 97.9 °F (36.6 °C)  98.2 °F (36.8 °C) 98.4 °F (36.9 °C)   TempSrc: Oral  Oral Oral   SpO2: 99%  100% 95%   Weight:   192 lb 3.9 oz (87.2 kg)    Height:                                                  BP Readings from Last 3 Encounters:   22 98/63   22 109/73   22 126/80       NPO Status: Time of last liquid consumption:                         Time of last solid consumption:                         Date of last liquid consumption: 22                        Date of last solid food consumption: 06/10/22    BMI:   Wt Readings from Last 3 Encounters:   22 192 lb 3.9 oz (87.2 kg)   22 199 lb 9.6 oz (90.5 kg)   22 200 lb (90.7 kg)     Body mass index is 27.58 kg/m².     CBC:   Lab Results   Component Value Date    WBC 3.7 2022    RBC 2.82 2022    RBC 4.75 2012    HGB 7.9 2022    HGB 7.9 2022    HCT 25.3 2022    HCT 25.6 2022    MCV 90.6 2022    RDW 16.2 2022     2022     2012       CMP:   Lab Results   Component Value Date     2022    K 4.0 2022     2022    CO2 22 2022    BUN 3 2022    CREATININE 0.48 2022    GFRAA >60 2022    LABGLOM >60 2022    GLUCOSE 108 06/09/2022    GLUCOSE 65 04/19/2012    PROT 4.2 06/09/2022    CALCIUM 8.1 06/09/2022    BILITOT 1.53 06/09/2022    ALKPHOS 76 06/09/2022    AST 15 06/09/2022    ALT 6 06/09/2022       POC Tests: No results for input(s): POCGLU, POCNA, POCK, POCCL, POCBUN, POCHEMO, POCHCT in the last 72 hours. Coags:   Lab Results   Component Value Date    PROTIME 16.9 06/07/2022    INR 1.4 06/07/2022    APTT 34.6 04/26/2022       HCG (If Applicable): No results found for: PREGTESTUR, PREGSERUM, HCG, HCGQUANT     ABGs: No results found for: PHART, PO2ART, EPE0XAW, UQE4JQO, BEART, T7WQMRJN     Type & Screen (If Applicable):  No results found for: LABABO, LABRH    Drug/Infectious Status (If Applicable):  Lab Results   Component Value Date    HEPCAB REACTIVE 12/23/2020       COVID-19 Screening (If Applicable):   Lab Results   Component Value Date    COVID19 Not Detected 04/14/2022    COVID19 Not Detected 02/02/2022    COVID19 Not Detected 07/17/2020           Anesthesia Evaluation  Patient summary reviewed and Nursing notes reviewed no history of anesthetic complications:   Airway: Mallampati: II  TM distance: >3 FB   Neck ROM: full  Mouth opening: > = 3 FB   Dental:          Pulmonary: breath sounds clear to auscultation  (+) shortness of breath: chronic,                             Cardiovascular:    (+) hypertension:,       ECG reviewed  Rhythm: regular  Rate: normal  Echocardiogram reviewed               ROS comment: Left ventricle is normal in size. Mild left ventricular hypertrophy. Global left ventricular systolic function is normal. Estimated LV EF 60-65  %. Left atrium is mildly dilated. No significant valvular regurgitation or stenosis seen. No significant pericardial effusion is seen.   Normal aortic root dimension.        Neuro/Psych:   (+) neuromuscular disease:, psychiatric history:            GI/Hepatic/Renal:   (+) GERD:, PUD, hepatitis: C, liver disease: esophageal varices,           Endo/Other:    (+) blood dyscrasia: anemia and thrombocytopenia:., .                 Abdominal:             Vascular: negative vascular ROS. Other Findings:           Anesthesia Plan      general     ASA 3       Induction: intravenous. Anesthetic plan and risks discussed with patient. Plan discussed with CRNA.                     Joe Ivan MD   6/9/2022

## 2022-06-09 NOTE — PROGRESS NOTES
RN received a call from [facility] in regards to the lasix dose at discharge. RN called Dr. Mildred Guevara to clarify. Labs and vital signs were reviewed. He requested RN to call in lasix 20mg daily and for the patient to have a BMP on Monday. RN called the mediation in to [facility] and called the patient to inform him that the lab orders were in for a BMP on Monday to monitor electrolytes.

## 2022-06-09 NOTE — CARE COORDINATION
Writer faxed Tien/DC med list to [facility]  Informed of DC today. Instructed to call writer w/ any questions. Left message for Melanie Valero, in regards to DC.

## 2022-06-10 ENCOUNTER — OFFICE VISIT (OUTPATIENT)
Dept: NEUROSURGERY | Age: 62
End: 2022-06-10
Payer: MEDICARE

## 2022-06-10 ENCOUNTER — HOSPITAL ENCOUNTER (OUTPATIENT)
Age: 62
Discharge: HOME | End: 2022-06-12
Payer: MEDICARE

## 2022-06-10 ENCOUNTER — HOSPITAL ENCOUNTER (OUTPATIENT)
Dept: GENERAL RADIOLOGY | Age: 62
Discharge: HOME | End: 2022-06-12
Payer: MEDICARE

## 2022-06-10 VITALS
DIASTOLIC BLOOD PRESSURE: 83 MMHG | SYSTOLIC BLOOD PRESSURE: 134 MMHG | WEIGHT: 193 LBS | HEIGHT: 70 IN | BODY MASS INDEX: 27.63 KG/M2 | HEART RATE: 86 BPM

## 2022-06-10 DIAGNOSIS — C15.9 ESOPHAGEAL ADENOCARCINOMA (HCC): ICD-10-CM

## 2022-06-10 DIAGNOSIS — M47.816 LUMBAR SPONDYLOSIS: ICD-10-CM

## 2022-06-10 DIAGNOSIS — M47.816 LUMBAR SPONDYLOSIS: Primary | ICD-10-CM

## 2022-06-10 DIAGNOSIS — K70.11 ASCITES DUE TO ALCOHOLIC HEPATITIS: ICD-10-CM

## 2022-06-10 LAB
APPEARANCE FLUID: ABNORMAL
BASO FLUID: 0 %
COLOR FLUID: YELLOW
EOSINOPHIL FLUID: 0 %
FLUID DIFF COMMENT: ABNORMAL
LYMPHOCYTES, BODY FLUID: 11 %
NEUTROPHIL, FLUID: 8 %
OTHER CELLS FLUID: 81 %
RBC FLUID: 179 /MM3
SPECIMEN TYPE: ABNORMAL
SURGICAL PATHOLOGY REPORT: NORMAL
WBC FLUID: 100 /MM3

## 2022-06-10 PROCEDURE — 72100 X-RAY EXAM L-S SPINE 2/3 VWS: CPT

## 2022-06-10 PROCEDURE — 99204 OFFICE O/P NEW MOD 45 MIN: CPT | Performed by: NURSE PRACTITIONER

## 2022-06-10 NOTE — PROGRESS NOTES
[address]  [facility] [address], [address] [address]  Dept: [phone]    Patient:  Ana Maria Dennis  YOB: 1960  Date: 6/10/22    The patient is a 57 y.o. male who presents today for consult of the following problems:     Chief Complaint   Patient presents with    New Patient     Spinal stenosis of lumbar region         HPI:     Ana Maria Dennis is a 57 y.o. male on whom neurosurgical consultation was requested by VASU Powell for management of low back pain. Has had longstanding low back pain for the last 15+ years. Follows with Dr Goyo Issa for interventions every few months. Denies any pain today, will worsen when the steroid shots wear off. Standing and walking worsen the pain, sitting alleviates the pain. Injections resolve the pain, usually until about a month before next injection is due, and pain becomes severe, requires wheelchair. Unable to stand or walk more than a couple of minutes without severe pain. Has completed physical therapy in the past with no lasting relief. Patient does have numerous comorbid conditions including alcoholic liver disease, portal hypertension, esophageal cancer, esophageal varices and has been treated with chemoradiation, as well as intermittent need for paracentesis for ascites.        History:     Past Medical History:   Diagnosis Date    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 Christina Valentino, orthopedic, every 3-4 months, gets steroid injection    Lezama esophagus     BPH (benign prostatic hypertrophy)     Cholelithiasis     Cirrhosis ([address]     COVID-19 12/2020    pt reports he had a positive test while at [facility] in 2020, was asymptomatic    COVID-19 vaccine series completed 5/20/2021, 6/22/2021    Moderna 5/20/2021, 6/22/2021    DDD (degenerative disc disease), lumbar     Depression     Esophageal cancer ([facility]     INVASIVE ADENOCARCINOMA ARISING IN TUBULAR ADENOMA WITH HIGH GRADE DYSPLASIA, ASSOCIATED WITH FOCAL INTESTINAL METAPLASIA     Esophageal varices (HCC)     Fatty liver     GERD (gastroesophageal reflux disease)     Hep C w/o coma, chronic (HCC)     History of alcohol abuse     6-12 beers a day; quit drinking July 2016    History of blood transfusion     History of colon polyps 2016    History of tobacco abuse     [employer] (hard of hearing)     Hyperlipidemia     Hypertension     Port-A-Cath in place     right upper chest    Sciatica     Spinal stenosis     Stomach ulcer     hx of    Tubular adenoma of colon 2016, 2018    Vitamin D deficiency     Wears glasses      Past Surgical History:   Procedure Laterality Date    BUNIONECTOMY      twice on right side    BUNIONECTOMY Left     CARPAL TUNNEL RELEASE Right     COLONOSCOPY      at age 35    COLONOSCOPY  10/05/2016    polyps-pathology tubular adenoma, and abnormal looking mucosa right colon-pathology-tubular adenoma    COLONOSCOPY N/A 3/30/2018    COLONOSCOPY POLYPECTOMY COLD BIOPSY performed by Tabatha Albarran MD at [place] [address]  03/30/2018    Small polyp in the sigmoid colon and excised with biopsy forceps--tubular adenoma    COLONOSCOPY N/A 4/16/2022    COLONOSCOPY POLYPECTOMY performed by Tabatha Albarran MD at [address] [place] [address] COLON, DIAGNOSTIC      EGD    IR PORT PLACEMENT EQUAL OR GREATER THAN 5 YEARS  4/19/2021    IR PORT PLACEMENT EQUAL OR GREATER THAN 5 YEARS 4/19/2021 [facility] SPECIAL PROCEDURES    KNEE SURGERY Left     cyst removed    NASAL SEPTUM SURGERY      NERVE BLOCK Right 11/23/2020    NERVE BLOCK RIGHT CERVICAL STEROID INJECTION  C3-C6 performed by Antonietta Fulton MD at [address]  01/04/16    steroid injection C7 T1    OTHER SURGICAL HISTORY  11/21/2016    Bilateral Lumbar CACHORRO L4-L5 injections    OTHER SURGICAL HISTORY  12/19/2016 lumbar steroid injection    OTHER SURGICAL HISTORY  09/28/2018    BILATERAL L5 CACHORRO (N/A Back)    OTHER SURGICAL HISTORY Right 11/23/2020    cervical injection    PAIN MANAGEMENT PROCEDURE Left 7/9/2020    EPIDURAL STEROID INJECTION LEFT L4 L5 performed by Shawnie Canavan, MD at [facility] Left 7/20/2020    LEFT L4 L5 EPIDURAL STEROID INJECTION performed by Shawnie Canavan, MD at [facility] Bilateral 8/17/2020    LUMBAR FACET BILATERAL L2-L5 performed by Shawnie Canavan, MD at [facility] Bilateral 12/7/2020    NERVE BLOCK BILATERAL LUMBAR MEDIAL BRANCH L2-L5 performed by Shawnie Canavan, MD at [address] AA&/STRD TFRML EPI LUMBAR/SACRAL 1 LEVEL Bilateral 9/6/2018    BILATERAL L5 CACHORRO performed by Shawnie Canavan, MD at [address] AA&/STRD TFRML EPI LUMBAR/SACRAL 1 LEVEL N/A 9/28/2018    BILATERAL L5 CACHORRO performed by Shawnie Canavan, MD at [address] [facility] N/CARPAL TUNNEL SURG Right 8/29/2017    CARPAL TUNNEL RELEASE RIGHT performed by Tamy Guo MD at [address] [facility] N/CARPAL TUNNEL SURG Left 10/31/2017    CARPAL TUNNEL RELEASE performed by Tamy Guo MD at [place] [address] 12/29/2020    EGD BIOPSY performed by Chantale Bennett MD at [place] [address] N/A 2/2/2021    EGD BIOPSY and spot marking performed by Mohan Lin MD at [place] [address] N/A 2/12/2021    ENDOSCOPIC ULTRASOUND, EGD performed by Mica Calderón MD at [address] [place] [address]  2/12/2021    EGD DIAGNOSTIC ONLY performed by Mica Calderón MD at [address] [place] [address] N/A 8/31/2021    EGD BIOPSY performed by Mohan Lin MD at [place] [address] 1/21/2022    EGD BIOPSY performed by Mohan Lin MD at [address] [place] [address] GASTROINTESTINAL ENDOSCOPY N/A 4/15/2022    EGD ESOPHAGOGASTRODUODENOSCOPY performed by Josue Garcia MD at [address] N/A 6/6/2022    EGD BAND LIGATION performed by Sanjeev Lozano MD at [address] N/A 6/9/2022    EGD ESOPHAGOGASTRODUODENOSCOPY performed by Sanjeev Lozano MD at [facility]       Family History   Problem Relation Age of Onset    Cancer Mother         pancreatic    Cancer Father         bone    Diabetes Sister     Asthma Brother      Current Outpatient Medications on File Prior to Visit   Medication Sig Dispense Refill    spironolactone (ALDACTONE) 50 MG tablet Take 1 tablet by mouth every evening 30 tablet 3    pantoprazole (PROTONIX) 40 MG tablet Take 1 tablet by mouth 2 times daily for 14 days 28 tablet 0    sucralfate (CARAFATE) 1 GM tablet Take 1 tablet by mouth 4 times daily for 14 days 56 tablet 0    furosemide (LASIX) 20 MG tablet Take 1 tablet by mouth daily (Patient taking differently: Take 40 mg by mouth in the morning, at noon, and at bedtime ) 60 tablet 3    vitamin D (CHOLECALCIFEROL) 25 MCG (1000 UT) TABS tablet Take 1,000 Units by mouth daily      ondansetron (ZOFRAN-ODT) 4 MG disintegrating tablet dissolve 1 tablet by mouth every 8 hours if needed for nausea and vomiting 10 tablet 0    lactulose (CHRONULAC) 10 GM/15ML solution take 30 milliliters by mouth three times a day 473 mL 1    nadolol (CORGARD) 20 MG tablet take 1 tablet by mouth once daily      FLUoxetine (PROZAC) 20 MG capsule Take 1 capsule by mouth daily 30 capsule 3    atorvastatin (LIPITOR) 20 MG tablet Take 1 tablet by mouth nightly 30 tablet 3    midodrine (PROAMATINE) 5 MG tablet Take 1 tablet by mouth 3 times daily as needed (SBP <110) 90 tablet 3    ferrous sulfate (IRON 325) 325 (65 Fe) MG tablet Take 1 tablet by mouth 2 times daily 60 tablet 5     No current facility-administered medications on file prior to visit. Social History     Tobacco Use    Smoking status: Former Smoker     Packs/day: 1.00     Years: 45.00     Pack years: 45.00     Quit date: 2017     Years since quittin.3    Smokeless tobacco: Never Used   Vaping Use    Vaping Use: Never used   Substance Use Topics    Alcohol use: Not Currently     Comment: quit     Drug use: Not Currently     Frequency: 1.0 times per week     Comment: cocaine,  stopped spring 2016       No Known Allergies    Review of Systems  Constitutional: Negative for activity change and appetite change. HENT: Negative for ear pain and facial swelling. Eyes: Negative for discharge and itching. Respiratory: Negative for choking and chest tightness. Cardiovascular: Negative for chest pain and leg swelling. Gastrointestinal: Negative for nausea and abdominal pain. Endocrine: Negative for cold intolerance and heat intolerance. Genitourinary: Negative for frequency and flank pain. Musculoskeletal: Negative for myalgias and joint swelling. Skin: Negative for rash and wound. Allergic/Immunologic: Negative for environmental allergies and food allergies. Hematological: Negative for adenopathy. Does not bruise/bleed easily. Psychiatric/Behavioral: Negative for self-injury. The patient is not nervous/anxious. Physical Exam:      /83 (Site: Left Upper Arm, Position: Sitting, Cuff Size: Medium Adult)   Pulse 86   Ht 5' 10\" (1.778 m)   Wt 193 lb (87.5 kg)   BMI 27.69 kg/m²   Estimated body mass index is 27.69 kg/m² as calculated from the following:    Height as of this encounter: 5' 10\" (1.778 m). Weight as of this encounter: 193 lb (87.5 kg). General:  Nydia Simon is a 57y.o. year old male who appears his stated age. HEENT: Normocephalic atraumatic. Neck supple. Chest: regular rate; pulses equal  Abdomen: Soft nontender nondistended.   Ext: DP and PT pulses 2+, good cap refill  Neuro    Mentation  Appropriate affect  Registration intact  Orientation intact  Judgement intact to situation    Cranial Nerves:   Pupils equal and reactive to light  Extraocular motion intact  Face and shrug symmetric  Tongue midline  No dysarthria  v1-3 sensation symmetric, masseter tone symmetric  Hearing symmetric    Sensation: Grossly intact on exam today    Motor  L deltoid 5/5; R deltoid 5/5  L biceps 5/5; R biceps 5/5  L triceps 5/5; R triceps 5/5  L wrist extension 5/5; R wrist extension 5/5  L intrinsics 5/5; R intrinsics 5/5     L iliopsoas 5/5 , R iliopsoas 5/5  L quadriceps 5/5; R quadriceps 5/5  L Dorsiflexion 5/5; R dorsiflexion 5/5  L Plantarflexion 5/5; R plantarflexion 5/5  L EHL 5/5; R EHL 5/5    Reflexes  L Brachioradialis 2+/4; R brachioradialis 2+/4  L Biceps 2+/4; R Biceps 2+/4  L Triceps 2+/4; R Triceps 2+/4  L Patellar 2+/4: R Patellar 2+/4  L Achilles 2+/4; R Achilles 2+/4    hoffmans L: neg  hoffmans R: neg  Clonus L: neg  Clonus R: neg  Babinski L: neg  Babinski R: neg    Studies Review:     MRI lumbar 12/7/2021:  FINDINGS:   BONES/ALIGNMENT: Trace retrolisthesis of L1 on L2 and of L4 on L5.  Marrow   signal appears within normal limits.  Chronic T12 wedge compression fracture   deformity with 40% height loss and mild bony retropulsion (no high-grade   thecal sac stenosis).  Multilevel intervertebral disc space narrowing, worse   at L1-L2, L3-L4, and L4-L5 where there is associated vacuum disc phenomenon   and Modic type 2 endplate changes.       SPINAL CORD: The conus terminates normally.       SOFT TISSUES: No paraspinal mass identified.       L1-L2: Trace uncovering the disc with superimposed disc osteophyte complex.    Mild/moderate thecal sac stenosis.  Mild bilateral foraminal narrowing.       L2-L3: Disc bulge eccentric to the left.  Mild thecal sac stenosis.  Mild   left foraminal narrowing.       L3-L4: Disc osteophyte complex.  Moderate thecal sac stenosis.  Moderate   right and mild/moderate left foraminal narrowing.  Significant interval   progression.       L4-L5: Uncovering the disc with superimposed disc osteophyte complex   eccentric to the left.  Facet hypertrophy.  No significant thecal sac   stenosis.  Moderate right and severe left foraminal narrowing       L5-S1: No significant thecal sac stenosis or foraminal narrowing. Pain management notes reviewed  Gastroenterology notes reviewed  Oncology notes reviewed    Assessment and Plan:      1. Lumbar spondylosis    2. Esophageal adenocarcinoma ([address]    3. Ascites due to alcoholic hepatitis          Plan: MRI findings with multilevel degenerative changes with mild to moderate central stenosis as well as varying degrees of foraminal stenosis. Patient with severe low back and leg pain that does respond to pain management interventions with pain resolution for several months at a time. Numerous comorbid conditions that likely preclude patient from being a surgical candidate given lack of red flag findings such as profound weakness, sensorimotor deficits, loss of bowel or bladder control. Will obtain flexion-extension x-rays to evaluate for any instability. Will review imaging with surgeon. Follow-up with pain management for any recommended interventions. Followup: Return if symptoms worsen or fail to improve. Prescriptions Ordered:  No orders of the defined types were placed in this encounter.      Orders Placed:  Orders Placed This Encounter   Procedures    XR LUMBAR SPINE (2-3 VIEWS)     Standing Status:   Future     Number of Occurrences:   1     Standing Expiration Date:   6/10/2023     Scheduling Instructions:      Standing lateral: flexion, extension, and neutral with both femoral heads     Order Specific Question:   Reason for exam:     Answer:   evaluate for instability        Electronically signed by MASSIMO Mata CNP on 6/10/2022 at 2:03 PM    Please note that this chart was generated using voice recognition Dragon dictation software. Although every effort was made to ensure the accuracy of this automated transcription, some errors in transcription may have occurred.

## 2022-06-13 ENCOUNTER — HOSPITAL ENCOUNTER (OUTPATIENT)
Age: 62
Discharge: HOME | End: 2022-06-13
Payer: MEDICARE

## 2022-06-13 ENCOUNTER — OFFICE VISIT (OUTPATIENT)
Dept: PRIMARY CARE CLINIC | Age: 62
End: 2022-06-13
Payer: MEDICARE

## 2022-06-13 VITALS
DIASTOLIC BLOOD PRESSURE: 68 MMHG | HEIGHT: 70 IN | SYSTOLIC BLOOD PRESSURE: 114 MMHG | WEIGHT: 199.8 LBS | BODY MASS INDEX: 28.6 KG/M2 | HEART RATE: 78 BPM | OXYGEN SATURATION: 98 %

## 2022-06-13 DIAGNOSIS — Z09 HOSPITAL DISCHARGE FOLLOW-UP: ICD-10-CM

## 2022-06-13 DIAGNOSIS — D69.6 THROMBOCYTOPENIA (HCC): ICD-10-CM

## 2022-06-13 DIAGNOSIS — D64.9 ANEMIA, UNSPECIFIED TYPE: Primary | ICD-10-CM

## 2022-06-13 DIAGNOSIS — B18.2 HEP C W/O COMA, CHRONIC (HCC): ICD-10-CM

## 2022-06-13 DIAGNOSIS — E87.1 HYPONATREMIA: ICD-10-CM

## 2022-06-13 DIAGNOSIS — D64.9 ANEMIA, UNSPECIFIED TYPE: ICD-10-CM

## 2022-06-13 LAB
ABO/RH: NORMAL
ABSOLUTE EOS #: 0.1 K/UL (ref 0–0.4)
ABSOLUTE LYMPH #: 1 K/UL (ref 1–4.8)
ABSOLUTE MONO #: 1.3 K/UL (ref 0.1–1.3)
ANION GAP SERPL CALCULATED.3IONS-SCNC: 8 MMOL/L (ref 9–17)
ANTIBODY SCREEN: NEGATIVE
ARM BAND NUMBER: NORMAL
BASOPHILS # BLD: 1 % (ref 0–2)
BASOPHILS ABSOLUTE: 0.1 K/UL (ref 0–0.2)
BLD PROD TYP BPU: NORMAL
BLOOD BANK BLOOD PRODUCT EXPIRATION DATE: NORMAL
BLOOD BANK ISBT PRODUCT BLOOD TYPE: 5100
BLOOD BANK PRODUCT CODE: NORMAL
BLOOD BANK UNIT TYPE AND RH: NORMAL
BPU ID: NORMAL
BUN BLDV-MCNC: 7 MG/DL (ref 8–23)
CALCIUM SERPL-MCNC: 8 MG/DL (ref 8.6–10.4)
CHLORIDE BLD-SCNC: 100 MMOL/L (ref 98–107)
CO2: 23 MMOL/L (ref 20–31)
CREAT SERPL-MCNC: 0.58 MG/DL (ref 0.7–1.2)
CROSSMATCH RESULT: NORMAL
DISPENSE STATUS BLOOD BANK: NORMAL
EOSINOPHILS RELATIVE PERCENT: 1 % (ref 0–4)
EXPIRATION DATE: NORMAL
GFR AFRICAN AMERICAN: >60 ML/MIN
GFR NON-AFRICAN AMERICAN: >60 ML/MIN
GFR SERPL CREATININE-BSD FRML MDRD: ABNORMAL ML/MIN/{1.73_M2}
GLUCOSE BLD-MCNC: 86 MG/DL (ref 70–99)
HCT VFR BLD CALC: 32.2 % (ref 41–53)
HEMOGLOBIN: 10 G/DL (ref 13.5–17.5)
LYMPHOCYTES # BLD: 10 % (ref 24–44)
MCH RBC QN AUTO: 28.3 PG (ref 26–34)
MCHC RBC AUTO-ENTMCNC: 31.2 G/DL (ref 31–37)
MCV RBC AUTO: 90.7 FL (ref 80–100)
MONOCYTES # BLD: 13 % (ref 1–7)
PDW BLD-RTO: 17.2 % (ref 11.5–14.9)
PLATELET # BLD: 244 K/UL (ref 150–450)
PMV BLD AUTO: 7.1 FL (ref 6–12)
POTASSIUM SERPL-SCNC: 3.8 MMOL/L (ref 3.7–5.3)
RBC # BLD: 3.55 M/UL (ref 4.5–5.9)
SEG NEUTROPHILS: 75 % (ref 36–66)
SEGMENTED NEUTROPHILS ABSOLUTE COUNT: 7.8 K/UL (ref 1.3–9.1)
SODIUM BLD-SCNC: 131 MMOL/L (ref 135–144)
TRANSFUSION STATUS: NORMAL
UNIT DIVISION: 0
UNIT ISSUE DATE/TIME: NORMAL
WBC # BLD: 10.2 K/UL (ref 3.5–11)

## 2022-06-13 PROCEDURE — 85025 COMPLETE CBC W/AUTO DIFF WBC: CPT

## 2022-06-13 PROCEDURE — 99214 OFFICE O/P EST MOD 30 MIN: CPT | Performed by: PHYSICIAN ASSISTANT

## 2022-06-13 PROCEDURE — 36415 COLL VENOUS BLD VENIPUNCTURE: CPT

## 2022-06-13 PROCEDURE — 80048 BASIC METABOLIC PNL TOTAL CA: CPT

## 2022-06-13 RX ORDER — FUROSEMIDE 20 MG/1
20 TABLET ORAL DAILY
Qty: 60 TABLET | Refills: 3
Start: 2022-06-13 | End: 2022-06-23

## 2022-06-13 ASSESSMENT — ENCOUNTER SYMPTOMS
VOMITING: 0
RHINORRHEA: 0
CONSTIPATION: 0
PHOTOPHOBIA: 0
EYE DISCHARGE: 0
SHORTNESS OF BREATH: 0
ABDOMINAL DISTENTION: 0
ABDOMINAL PAIN: 0
SINUS PRESSURE: 0
COUGH: 0
DIARRHEA: 0
CHEST TIGHTNESS: 0
SORE THROAT: 0

## 2022-06-13 NOTE — PROGRESS NOTES
[facility] PRIMARY [facility]ARE  [address] [facility] [address]  Dept: [address] Cristhian Young is a 57 y.o. male Established patient, who presents today for his medical conditions/complaints as noted below. Chief Complaint   Patient presents with    Anemia     Follow up        HPI:     HPI: The patient was admitted to hospital for low sodium. Had low hemoglobin and some GI bleed which needed banding with scope. He is feeling better now. Was in hospital from 6-2 to 6-9 at [address] [place] [address]. According to patient he was taking 40 mg of Lasix 3 times a day. Hospitalist changed him to 20 mg once daily of furosemide. According to patient Dr. Diego Burks had told patient to take 100 mg of spironolactone 3 times per day, however in the hospital patient was switched to 50 mg of spironolactone every evening. Patient has follow-up blood work from kidney doctor to recheck electrolytes. Has follow-up with Dr. Emi Calle next week. I have placed standing order for hemoglobin and hematocrit weekly due to patient's anemia. Has anemia which has not been controlled.      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 Bernhard Oppenheim, orthopedic, every 3-4 months, gets steroid injection    Lezama esophagus     BPH (benign prostatic hypertrophy)     Cholelithiasis     Cirrhosis ([address]     COVID-19 12/2020    pt reports he had a positive test while at [facility] in 2020, was asymptomatic    COVID-19 vaccine series completed 5/20/2021, 6/22/2021    Moderna 5/20/2021, 6/22/2021    DDD (degenerative disc disease), lumbar     Depression     Esophageal [facility] ([facility]     INVASIVE ADENOCARCINOMA ARISING IN TUBULAR ADENOMA WITH HIGH GRADE DYSPLASIA, ASSOCIATED WITH FOCAL INTESTINAL METAPLASIA     Esophageal varices ([facility]     Fatty liver     GERD (gastroesophageal reflux disease)     Hep C w/o coma, chronic ([address]     History of alcohol abuse     6-12 beers a day; quit drinking July 2016    History of blood transfusion     History of colon polyps 2016    History of tobacco abuse     [place] (hard of hearing)     Hyperlipidemia     Hypertension     Port-A-Cath in place     right upper chest    Sciatica     Spinal stenosis     Stomach ulcer     hx of    Tubular adenoma of colon 2016, 2018    Vitamin D deficiency     Wears glasses       Past Surgical History:   Procedure Laterality Date    BUNIONECTOMY      twice on right side    BUNIONECTOMY Left     CARPAL TUNNEL RELEASE Right     COLONOSCOPY      at age 35    COLONOSCOPY  10/05/2016    polyps-pathology tubular adenoma, and abnormal looking mucosa right colon-pathology-tubular adenoma    COLONOSCOPY N/A 3/30/2018    COLONOSCOPY POLYPECTOMY COLD BIOPSY performed by Mohan Lin MD at [address] [facility]  03/30/2018    Small polyp in the sigmoid colon and excised with biopsy forceps--tubular adenoma    COLONOSCOPY N/A 4/16/2022    COLONOSCOPY POLYPECTOMY performed by Mohan Lin MD at [address] [facility] [place], DIAGNOSTIC      EGD    IR PORT PLACEMENT EQUAL OR GREATER THAN 5 YEARS  4/19/2021    IR PORT PLACEMENT EQUAL OR GREATER THAN 5 YEARS 4/19/2021 [facility] SPECIAL PROCEDURES    KNEE SURGERY Left     cyst removed    NASAL SEPTUM SURGERY      NERVE BLOCK Right 11/23/2020    NERVE BLOCK RIGHT CERVICAL STEROID INJECTION  C3-C6 performed by Margorie Crigler, MD at [facility]  01/04/16    steroid injection C7 T1    OTHER SURGICAL HISTORY  11/21/2016    Bilateral Lumbar CACHORRO L4-L5 injections    OTHER SURGICAL HISTORY  12/19/2016    lumbar steroid injection    OTHER SURGICAL HISTORY  09/28/2018    BILATERAL L5 CACHORRO (N/A Back)    OTHER SURGICAL HISTORY Right 11/23/2020    cervical injection    PAIN MANAGEMENT PROCEDURE Left 7/9/2020    EPIDURAL STEROID INJECTION LEFT L4 L5 performed by Margorie Crigler, MD at [address] [place] [address] Left 7/20/2020    LEFT L4 L5 EPIDURAL STEROID INJECTION performed by Margorie Crigler, MD at [address] [place] [address] Bilateral 8/17/2020    LUMBAR FACET BILATERAL L2-L5 performed by Margorie Crigler, MD at [address] [place] [address] Bilateral 12/7/2020    NERVE BLOCK BILATERAL LUMBAR MEDIAL BRANCH L2-L5 performed by Margorie Crigler, MD at [address] AA&/STRD TFRML EPI LUMBAR/SACRAL 1 LEVEL Bilateral 9/6/2018    BILATERAL L5 CACHORRO performed by Margorie Crigler, MD at [address] AA&/STRD TFRML EPI LUMBAR/SACRAL 1 LEVEL N/A 9/28/2018    BILATERAL L5 CACHORRO performed by Margorie Crigler, MD at [address] [facility] N/CARPAL TUNNEL SURG Right 8/29/2017    CARPAL TUNNEL RELEASE RIGHT performed by Kristin Giraldo MD at [address] [facility] N/CARPAL TUNNEL SURG Left 10/31/2017    CARPAL TUNNEL RELEASE performed by Kristin Giraldo MD at [address] 12/29/2020    EGD BIOPSY performed by Maya Hoffmann MD at [address] 2/2/2021    EGD BIOPSY and spot marking performed by Mana Adame MD at [address] N/A 2/12/2021    ENDOSCOPIC ULTRASOUND, EGD performed by Nicola Goel MD at [facility]  2/12/2021    EGD DIAGNOSTIC ONLY performed by Nicola Goel MD at  [place] [address] UPPER GASTROINTESTINAL ENDOSCOPY N/A 2021    EGD BIOPSY performed by Catracho Lamar MD at [place] [address] N/A 2022    EGD BIOPSY performed by Catracho Lamar MD at [place] [address] N/A 4/15/2022    EGD ESOPHAGOGASTRODUODENOSCOPY performed by Lexi Gallegos MD at [facility]. N/A 2022    EGD BAND LIGATION performed by Anastasiya Hassan MD at [place] [address] N/A 2022    EGD ESOPHAGOGASTRODUODENOSCOPY performed by Anastasiya Hassan MD at [address] [facility]         Family History   Problem Relation Age of Onset    Cancer Mother         pancreatic    Cancer Father         bone    Diabetes Sister     Asthma Brother        Social History     Tobacco Use    Smoking status: Former Smoker     Packs/day: 1.00     Years: 45.00     Pack years: 45.00     Quit date: 2017     Years since quittin.4    Smokeless tobacco: Never Used   Substance Use Topics    Alcohol use: Not Currently     Comment: quit 2019      Current Outpatient Medications   Medication Sig Dispense Refill    furosemide (LASIX) 20 MG tablet Take 1 tablet by mouth daily 60 tablet 3    spironolactone (ALDACTONE) 50 MG tablet Take 1 tablet by mouth every evening 30 tablet 3    pantoprazole (PROTONIX) 40 MG tablet Take 1 tablet by mouth 2 times daily for 14 days 28 tablet 0    sucralfate (CARAFATE) 1 GM tablet Take 1 tablet by mouth 4 times daily for 14 days 56 tablet 0    vitamin D (CHOLECALCIFEROL) 25 MCG (1000 UT) TABS tablet Take 1,000 Units by mouth daily      ondansetron (ZOFRAN-ODT) 4 MG disintegrating tablet dissolve 1 tablet by mouth every 8 hours if needed for nausea and vomiting 10 tablet 0    lactulose (CHRONULAC) 10 GM/15ML solution take 30 milliliters by mouth three times a day 473 mL 1    nadolol (CORGARD) 20 MG tablet take 1 tablet by mouth once daily      FLUoxetine (PROZAC) 20 MG capsule Take 1 capsule by mouth daily 30 capsule 3    atorvastatin (LIPITOR) 20 MG tablet Take 1 tablet by mouth nightly 30 tablet 3    midodrine (PROAMATINE) 5 MG tablet Take 1 tablet by mouth 3 times daily as needed (SBP <110) 90 tablet 3    ferrous sulfate (IRON 325) 325 (65 Fe) MG tablet Take 1 tablet by mouth 2 times daily 60 tablet 5     No current facility-administered medications for this visit. No Known Allergies    Health Maintenance   Topic Date Due    Shingles vaccine (1 of 2) Never done    Low dose CT lung screening  Never done    Pneumococcal 0-64 years Vaccine (3 - PCV) 07/25/2017    COVID-19 Vaccine (3 - Moderna risk 4-dose series) 07/20/2021    Annual Wellness Visit (AWV)  03/12/2022    Lipids  08/19/2022    Prostate Specific Antigen (PSA) Screening or Monitoring  08/19/2022    Depression Monitoring  05/02/2023    Diabetes screen  08/19/2024    DTaP/Tdap/Td vaccine (2 - Td or Tdap) 04/06/2027    Colorectal Cancer Screen  04/16/2027    Hepatitis A vaccine  Completed    Hepatitis B vaccine  Completed    Flu vaccine  Completed    Hib vaccine  Aged Out    Meningococcal (ACWY) vaccine  Aged Out    HIV screen  Discontinued       Subjective:      Review of Systems   Constitutional: Negative for chills, fever and unexpected weight change. HENT: Negative for congestion, hearing loss, rhinorrhea, sinus pressure and sore throat. Eyes: Negative for photophobia, discharge and visual disturbance. Respiratory: Negative for cough, chest tightness and shortness of breath. Cardiovascular: Negative for chest pain, palpitations and leg swelling. Gastrointestinal: Negative for abdominal distention, abdominal pain, constipation, diarrhea and vomiting. Endocrine: Negative for polydipsia and polyuria. Genitourinary: Negative for decreased urine volume, difficulty urinating, frequency and urgency. Musculoskeletal: Negative for arthralgias, gait problem and myalgias. Skin: Negative for rash. Allergic/Immunologic: Negative for food allergies. Neurological: Negative for dizziness, weakness, numbness and headaches. Hematological: Negative for adenopathy. Psychiatric/Behavioral: Negative for dysphoric mood and sleep disturbance. The patient is not nervous/anxious. Objective:     /68   Pulse 78   Ht 5' 10\" (1.778 m)   Wt 199 lb 12.8 oz (90.6 kg)   SpO2 98%   BMI 28.67 kg/m²   Physical Exam  Constitutional:       General: He is not in acute distress. Appearance: Normal appearance. He is not ill-appearing. HENT:      Head: Normocephalic and atraumatic. Right Ear: External ear normal.      Left Ear: External ear normal.      Nose: Nose normal.      Mouth/Throat:      Mouth: Mucous membranes are moist.   Eyes:      Extraocular Movements: Extraocular movements intact. Conjunctiva/sclera: Conjunctivae normal.      Pupils: Pupils are equal, round, and reactive to light. Neck:      Vascular: No carotid bruit. Cardiovascular:      Rate and Rhythm: Normal rate and regular rhythm. Pulses: Normal pulses. Heart sounds: Normal heart sounds. Pulmonary:      Effort: Pulmonary effort is normal. No respiratory distress. Breath sounds: Normal breath sounds. Abdominal:      General: Bowel sounds are normal. There is no distension. Tenderness: There is no abdominal tenderness. Musculoskeletal:         General: Normal range of motion. Cervical back: Normal range of motion and neck supple. Lymphadenopathy:      Cervical: No cervical adenopathy. Skin:     General: Skin is warm and dry. Neurological:      General: No focal deficit present. Mental Status: He is alert and oriented to person, place, and time. Psychiatric:         Mood and Affect: Mood normal.         Behavior: Behavior normal.         Thought Content: Thought content normal.         Assessment and Plan:          1.  Anemia, unspecified type  -     CBC with Auto Differential; Future  -     Hemoglobin and Hematocrit; Standing  -     furosemide (LASIX) 20 MG tablet; Take 1 tablet by mouth daily, Disp-60 tablet, R-3Adjust Sig  2. Hospital discharge follow-up  -     furosemide (LASIX) 20 MG tablet; Take 1 tablet by mouth daily, Disp-60 tablet, R-3Adjust Sig  3. Thrombocytopenia (HCC)  -     furosemide (LASIX) 20 MG tablet; Take 1 tablet by mouth daily, Disp-60 tablet, R-3Adjust Sig  4. Hep C w/o coma, chronic (HCC)  -     furosemide (LASIX) 20 MG tablet; Take 1 tablet by mouth daily, Disp-60 tablet, R-3Adjust Sig  I have updated patient's chart to reflect medications from last discharge. Patient educated to follow-up with Dr. Andrade Méndez and nephrology for any changes to diuretics from this point on. Patient educated on the importance of finding a balance between controlling ascites and keeping electrolytes and checked. We will also need to have frequent checks of hemoglobin with patient's frequent anemia. Patient given educational materials - see patient instructions. Discussed use, benefit, and side effects of prescribed medications. All patient questions answered. Pt voiced understanding. Reviewed health maintenance. Instructed to continue current medications, diet and exercise. Patient agreed with treatment plan. Follow up as directed.      Electronically signed by VASU Dubose on 6/13/2022 at 11:43 AM

## 2022-06-14 ENCOUNTER — OFFICE VISIT (OUTPATIENT)
Dept: PAIN MANAGEMENT | Age: 62
End: 2022-06-14
Payer: MEDICARE

## 2022-06-14 VITALS
WEIGHT: 200.8 LBS | HEIGHT: 70 IN | DIASTOLIC BLOOD PRESSURE: 75 MMHG | SYSTOLIC BLOOD PRESSURE: 112 MMHG | BODY MASS INDEX: 28.75 KG/M2 | HEART RATE: 67 BPM | OXYGEN SATURATION: 100 %

## 2022-06-14 DIAGNOSIS — M48.062 SPINAL STENOSIS OF LUMBAR REGION WITH NEUROGENIC CLAUDICATION: ICD-10-CM

## 2022-06-14 DIAGNOSIS — Z92.241 S/P EPIDURAL STEROID INJECTION: ICD-10-CM

## 2022-06-14 DIAGNOSIS — M51.26 LUMBAR DISC HERNIATION: Primary | ICD-10-CM

## 2022-06-14 PROCEDURE — 99213 OFFICE O/P EST LOW 20 MIN: CPT | Performed by: ANESTHESIOLOGY

## 2022-06-14 ASSESSMENT — ENCOUNTER SYMPTOMS
NAUSEA: 0
CONSTIPATION: 0
COUGH: 0
BACK PAIN: 1
DIARRHEA: 0
VOMITING: 0
SHORTNESS OF BREATH: 0

## 2022-06-14 NOTE — PROGRESS NOTES
The patient is a 57 y. o. Non- / non  male.     Chief Complaint   Patient presents with    Back Pain    Results     discuss neuro visit         HPI     79-year-old man with severe comorbid illness, chronic liver disease severe ascites esophageal varices and thrombocytopenia    He is seen in our clinic for history of chronic back and leg pain  Diagnosed with lumbar spinal stenosis  Failed different modalities  Recently had an epidural injection  Today for follow-up  Reports no side effect  Reports significant improvement in back and leg pain  Today pain score 0/10  Was recently evaluated by the neurosurgery office and is not advised for any surgery at this time      Pain is # 0    Pain is in the back     Pain runs into legs     Pain is intermintin     Pain is cramping    What aggravates the pain walking standing     What alleviates the pain sitting     Pain has been there for years       Past Medical History:   Diagnosis Date    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 Tre Lloyd, orthopedic, every 3-4 months, gets steroid injection    Lezama esophagus     BPH (benign prostatic hypertrophy)     Cholelithiasis     Cirrhosis ([address]     COVID-19 12/2020    pt reports he had a positive test while at [facility] in 2020, was asymptomatic    COVID-19 vaccine series completed 5/20/2021, 6/22/2021    Moderna 5/20/2021, 6/22/2021    DDD (degenerative disc disease), lumbar     Depression     Esophageal cancer ([address]     INVASIVE ADENOCARCINOMA ARISING IN TUBULAR ADENOMA WITH HIGH GRADE DYSPLASIA, ASSOCIATED WITH FOCAL INTESTINAL METAPLASIA     Esophageal varices ([address]     Fatty liver     GERD (gastroesophageal reflux disease)     Hep C w/o coma, chronic ([address]     History of alcohol abuse     6-12 beers a day; quit drinking July 2016    History of blood transfusion     History of colon polyps 2016    History of tobacco abuse     [place] (hard of hearing)  Hyperlipidemia     Hypertension     Port-A-Cath in place     right upper chest    Sciatica     Spinal stenosis     Stomach ulcer     hx of    Tubular adenoma of colon 2016, 2018    Vitamin D deficiency     Wears glasses         Past Surgical History:   Procedure Laterality Date    BUNIONECTOMY      twice on right side    BUNIONECTOMY Left     CARPAL TUNNEL RELEASE Right     COLONOSCOPY      at age 35    COLONOSCOPY  10/05/2016    polyps-pathology tubular adenoma, and abnormal looking mucosa right colon-pathology-tubular adenoma    COLONOSCOPY N/A 3/30/2018    COLONOSCOPY POLYPECTOMY COLD BIOPSY performed by Prasanna Singh MD at [address] [place] [address]  03/30/2018    Small polyp in the sigmoid colon and excised with biopsy forceps--tubular adenoma    COLONOSCOPY N/A 4/16/2022    COLONOSCOPY POLYPECTOMY performed by Prasanna Singh MD at [address] [place] [address], COLON, DIAGNOSTIC      EGD    IR PORT PLACEMENT EQUAL OR GREATER THAN 5 YEARS  4/19/2021    IR PORT PLACEMENT EQUAL OR GREATER THAN 5 YEARS 4/19/2021 [facility] SPECIAL PROCEDURES    KNEE SURGERY Left     cyst removed    NASAL SEPTUM SURGERY      NERVE BLOCK Right 11/23/2020    NERVE BLOCK RIGHT CERVICAL STEROID INJECTION  C3-C6 performed by Alejandrina Puckett MD at [place] [address]  01/04/16    steroid injection C7 T1    OTHER SURGICAL HISTORY  11/21/2016    Bilateral Lumbar CACHORRO L4-L5 injections    OTHER SURGICAL HISTORY  12/19/2016    lumbar steroid injection    OTHER SURGICAL HISTORY  09/28/2018    BILATERAL L5 CACHORRO (N/A Back)    OTHER SURGICAL HISTORY Right 11/23/2020    cervical injection    PAIN MANAGEMENT PROCEDURE Left 7/9/2020    EPIDURAL STEROID INJECTION LEFT L4 L5 performed by Alejandrina Puckett MD at [place] [address] Left 7/20/2020    LEFT L4 L5 EPIDURAL STEROID INJECTION performed by Alejandrina Puckett MD at [place] [address] Bilateral 8/17/2020    LUMBAR FACET BILATERAL L2-L5 performed by Ezekiel Walters MD at [facility] Bilateral 12/7/2020    NERVE BLOCK BILATERAL LUMBAR MEDIAL BRANCH L2-L5 performed by Ezekiel Walters MD at [address] AA&/STRD TFRML EPI LUMBAR/SACRAL 1 LEVEL Bilateral 9/6/2018    BILATERAL L5 CACHORRO performed by Ezekiel Walters MD at [address] AA&/STRD TFRML EPI LUMBAR/SACRAL 1 LEVEL N/A 9/28/2018    BILATERAL L5 CACHORRO performed by Ezekiel Walters MD at [address] N/CARPAL TUNNEL SURG Right 8/29/2017    CARPAL TUNNEL RELEASE RIGHT performed by Alyse Bolaños MD at [address] N/CARPAL TUNNEL SURG Left 10/31/2017    CARPAL TUNNEL RELEASE performed by Alyse Bolaños MD at [address] [facility] 12/29/2020    EGD BIOPSY performed by Juan Alberto Chung MD at [place] [address] N/A 2/2/2021    EGD BIOPSY and spot marking performed by Sandra Hernández MD at [place] [address] 2/12/2021    ENDOSCOPIC ULTRASOUND, EGD performed by Lindsay Holden MD at [address] [place] [address]  2/12/2021    EGD DIAGNOSTIC ONLY performed by Lindsay Holden MD at [address] [place] [address] N/A 8/31/2021    EGD BIOPSY performed by Sandra Hernández MD at [place] [address] 1/21/2022    EGD BIOPSY performed by Sandra Hernández MD at [place] [address] 4/15/2022    EGD ESOPHAGOGASTRODUODENOSCOPY performed by Juan Alberto Chung MD at [address] [facility] 6/6/2022    EGD BAND LIGATION performed by Laura Lord MD at [place] [address] 6/9/2022    EGD ESOPHAGOGASTRODUODENOSCOPY performed by Laura Lord MD at [facility] History     Socioeconomic History    Marital status: Single     Spouse name: None    Housing in the Last Year: Not on file       Family History   Problem Relation Age of Onset    Cancer Mother         pancreatic    Cancer Father         bone    Diabetes Sister     Asthma Brother        No Known Allergies    Vitals:    06/14/22 1142   BP: 112/75   Pulse: 67   SpO2: 100%       Current Outpatient Medications   Medication Sig Dispense Refill    furosemide (LASIX) 20 MG tablet Take 1 tablet by mouth daily 60 tablet 3    spironolactone (ALDACTONE) 50 MG tablet Take 1 tablet by mouth every evening 30 tablet 3    pantoprazole (PROTONIX) 40 MG tablet Take 1 tablet by mouth 2 times daily for 14 days 28 tablet 0    sucralfate (CARAFATE) 1 GM tablet Take 1 tablet by mouth 4 times daily for 14 days 56 tablet 0    vitamin D (CHOLECALCIFEROL) 25 MCG (1000 UT) TABS tablet Take 1,000 Units by mouth daily      ondansetron (ZOFRAN-ODT) 4 MG disintegrating tablet dissolve 1 tablet by mouth every 8 hours if needed for nausea and vomiting 10 tablet 0    lactulose (CHRONULAC) 10 GM/15ML solution take 30 milliliters by mouth three times a day 473 mL 1    nadolol (CORGARD) 20 MG tablet take 1 tablet by mouth once daily      FLUoxetine (PROZAC) 20 MG capsule Take 1 capsule by mouth daily 30 capsule 3    atorvastatin (LIPITOR) 20 MG tablet Take 1 tablet by mouth nightly 30 tablet 3    midodrine (PROAMATINE) 5 MG tablet Take 1 tablet by mouth 3 times daily as needed (SBP <110) 90 tablet 3    ferrous sulfate (IRON 325) 325 (65 Fe) MG tablet Take 1 tablet by mouth 2 times daily 60 tablet 5     No current facility-administered medications for this visit. Review of Systems   Constitutional: Negative for chills, fatigue and fever. Respiratory: Negative for cough and shortness of breath. Gastrointestinal: Negative for constipation, diarrhea, nausea and vomiting. Musculoskeletal: Positive for back pain and gait problem. Negative for neck pain and neck stiffness.    Neurological: Negative for dizziness, weakness, numbness and headaches. Objective:  General Appearance:  Comfortable, in no acute distress and in pain. Vital signs: (most recent): Blood pressure 112/75, pulse 67, height 5' 10\" (1.778 m), weight 200 lb 12.8 oz (91.1 kg), SpO2 100 %. Vital signs are normal.  No fever. Output: Producing urine and producing stool. Lungs:  Normal effort. He is not in respiratory distress. Heart: Normal rate. Neurological: Patient is alert and oriented to person, place and time. Assessment & Plan   57-year-old man with severe comorbid illness, chronic liver disease severe ascites esophageal varices and thrombocytopenia    He is seen in our clinic for history of chronic back and leg pain  Diagnosed with lumbar spinal stenosis  Failed different modalities  Recently had an epidural injection  Today for follow-up  Reports no side effect  Reports significant improvement in back and leg pain  Today pain score 0/10  Was recently evaluated by the neurosurgery office and is not advised for any surgery at this time      1. Lumbar disc herniation    2. Spinal stenosis of lumbar region with neurogenic claudication    3. S/P epidural steroid injection        No orders of the defined types were placed in this encounter. No orders of the defined types were placed in this encounter.      For any interventional procedure risk of bleeding associated with thrombocytopenia    Will recommend to avoid interventional procedure as much as possible    Risk for opioid related respiratory depression is also high considering severe liver disease    Conservative measures with TENS unit therapy lifestyle modification is the only way forward or topical creams    Follow-up as needed basis    Advised patient to continue follow-up with GI for possible ascites drainage paracentesis        Electronically signed by Berenice Vinson MD on 6/14/2022 at 12:15 PM

## 2022-06-15 LAB
CULTURE: ABNORMAL
DIRECT EXAM: ABNORMAL
SPECIMEN DESCRIPTION: ABNORMAL

## 2022-06-20 RX ORDER — SUCRALFATE 1 G/1
TABLET ORAL
Qty: 56 TABLET | Refills: 0 | OUTPATIENT
Start: 2022-06-20

## 2022-06-21 ENCOUNTER — OFFICE VISIT (OUTPATIENT)
Dept: GASTROENTEROLOGY | Age: 62
End: 2022-06-21
Payer: MEDICARE

## 2022-06-21 VITALS — HEART RATE: 82 BPM | SYSTOLIC BLOOD PRESSURE: 127 MMHG | DIASTOLIC BLOOD PRESSURE: 81 MMHG

## 2022-06-21 DIAGNOSIS — K70.31 ASCITES DUE TO ALCOHOLIC CIRRHOSIS (HCC): Primary | ICD-10-CM

## 2022-06-21 PROCEDURE — 99213 OFFICE O/P EST LOW 20 MIN: CPT | Performed by: INTERNAL MEDICINE

## 2022-06-21 ASSESSMENT — ENCOUNTER SYMPTOMS
RESPIRATORY NEGATIVE: 1
ABDOMINAL DISTENTION: 1
ALLERGIC/IMMUNOLOGIC NEGATIVE: 1

## 2022-06-21 NOTE — PROGRESS NOTES
GI OFFICE FOLLOW UP    INTERVAL HISTORY:   No referring provider defined for this encounter. Chief Complaint   Patient presents with    Cirrhosis     pt is here to f/u from hosp, labs and EGD       1. Ascites due to alcoholic cirrhosis (HCC)              HISTORY OF PRESENT ILLNESS:   Patient seen along with his wife. Recently patient was admitted at [facility] with electrolyte abnormalities. At that time he had a GI bleed as well and had a EGD and esophageal banding done. Following discharge in the last week he is doing reasonably well. No melanotic stools. He is not clear the medications especially the diuretics and dosages. He stated that his abdomen needs distended. No nausea vomiting. Tolerating diet. Also not clear whether he is on salt restricted diet. Past Medical,Family, and Social History reviewed and does contribute to the patient presenting condition. Patient's PMH/PSH,SH,PSYCH Hx, MEDs, ALLERGIES, and ROS were all reviewed and updated in the appropriate sections.  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 Jack Khan, orthopedic, every 3-4 months, gets steroid injection    Lezama esophagus     BPH (benign prostatic hypertrophy)     Cholelithiasis     Cirrhosis ([address]     COVID-19 12/2020    pt reports he had a positive test while at [facility] in 2020, was asymptomatic    COVID-19 vaccine series completed 5/20/2021, 6/22/2021    Moderna 5/20/2021, 6/22/2021    DDD (degenerative disc disease), lumbar     Depression     Esophageal cancer ([address]     INVASIVE ADENOCARCINOMA ARISING IN TUBULAR ADENOMA WITH HIGH GRADE DYSPLASIA, ASSOCIATED WITH FOCAL INTESTINAL METAPLASIA     Esophageal varices ([address]     Fatty liver     GERD (gastroesophageal reflux disease)     Hep C w/o coma, chronic (HCC)     History of alcohol abuse     6-12 beers a day; quit drinking July 2016    History of blood transfusion     History of colon polyps 2016    History of tobacco abuse     [place] (hard of hearing)     Hyperlipidemia     Hypertension     Port-A-Cath in place     right upper chest    Sciatica     Spinal stenosis     Stomach ulcer     hx of    Tubular adenoma of colon 2016, 2018    Vitamin D deficiency     Wears glasses        Past Surgical History:   Procedure Laterality Date    BUNIONECTOMY      twice on right side    BUNIONECTOMY Left     CARPAL TUNNEL RELEASE Right     COLONOSCOPY      at age 35    COLONOSCOPY  10/05/2016    polyps-pathology tubular adenoma, and abnormal looking mucosa right colon-pathology-tubular adenoma    COLONOSCOPY N/A 3/30/2018    COLONOSCOPY POLYPECTOMY COLD BIOPSY performed by Josue Reyes MD at [facility]  03/30/2018    Small polyp in the sigmoid colon and excised with biopsy forceps--tubular adenoma    COLONOSCOPY N/A 4/16/2022    COLONOSCOPY POLYPECTOMY performed by Josue Reyes MD at [address] [place] [address], COLON, DIAGNOSTIC      EGD    IR PORT PLACEMENT EQUAL OR GREATER THAN 5 YEARS  4/19/2021    IR PORT PLACEMENT EQUAL OR GREATER THAN 5 YEARS 4/19/2021 [facility] SPECIAL PROCEDURES    KNEE SURGERY Left     cyst removed    NASAL SEPTUM SURGERY      NERVE BLOCK Right 11/23/2020    NERVE BLOCK RIGHT CERVICAL STEROID INJECTION  C3-C6 performed by Dorcas Puri MD at [facility]  01/04/16    steroid injection C7 T1    OTHER SURGICAL HISTORY  11/21/2016    Bilateral Lumbar CACHORRO L4-L5 injections    OTHER SURGICAL HISTORY  12/19/2016    lumbar steroid injection    OTHER SURGICAL HISTORY  09/28/2018    BILATERAL L5 CACHORRO (N/A Back)    OTHER SURGICAL HISTORY Right 11/23/2020    cervical injection    PAIN MANAGEMENT PROCEDURE Left 7/9/2020    EPIDURAL STEROID INJECTION LEFT L4 L5 performed by Juliana Self MD at [facility] Left 7/20/2020    LEFT L4 L5 EPIDURAL STEROID INJECTION performed by Juliana Self MD at [place] [id number] Bilateral 8/17/2020    LUMBAR FACET BILATERAL L2-L5 performed by Juliana Self MD at [place] [id number] Bilateral 12/7/2020    NERVE BLOCK BILATERAL LUMBAR MEDIAL BRANCH L2-L5 performed by Juliana Self MD at [address] AA&/STRD TFRML EPI LUMBAR/SACRAL 1 LEVEL Bilateral 9/6/2018    BILATERAL L5 CACHORRO performed by Juliana Self MD at [address] AA&/STRD TFRML EPI LUMBAR/SACRAL 1 LEVEL N/A 9/28/2018    BILATERAL L5 CACHORRO performed by Juliana Self MD at [facility] N/CARPAL TUNNEL SURG Right 8/29/2017    CARPAL TUNNEL RELEASE RIGHT performed by Vera Arizmendi MD at [facility] N/CARPAL TUNNEL SURG Left 10/31/2017    CARPAL TUNNEL RELEASE performed by Vera Arizmendi MD at [address] [place] [address] 12/29/2020    EGD BIOPSY performed by Antoinette Kumar MD at [address] [place] [address] N/A 2/2/2021    EGD BIOPSY and spot marking performed by Jose Tompkins MD at [address] [place] [address] 2/12/2021    ENDOSCOPIC ULTRASOUND, EGD performed by Kirill Tomas MD at [address] [place] [address]  2/12/2021    EGD DIAGNOSTIC ONLY performed by Kirill Tomas MD at [address] [place] [address] N/A 8/31/2021    EGD BIOPSY performed by Jose Tompkins MD at [address] [place] [address] 1/21/2022    EGD BIOPSY performed by Jose Tompkins MD at [address] [place] [address] 4/15/2022    EGD ESOPHAGOGASTRODUODENOSCOPY performed by Antoinette Kumar MD at [address] [place] [address] 6/6/2022    EGD BAND LIGATION performed by Raquel Best MD at [place] [address]  UPPER GASTROINTESTINAL ENDOSCOPY N/A 6/9/2022    EGD ESOPHAGOGASTRODUODENOSCOPY performed by Stefanie Wolfe MD at [facility] [place] [facility]:    Current Outpatient Medications:     furosemide (LASIX) 20 MG tablet, Take 1 tablet by mouth daily, Disp: 60 tablet, Rfl: 3    spironolactone (ALDACTONE) 50 MG tablet, Take 1 tablet by mouth every evening, Disp: 30 tablet, Rfl: 3    pantoprazole (PROTONIX) 40 MG tablet, Take 1 tablet by mouth 2 times daily for 14 days, Disp: 28 tablet, Rfl: 0    sucralfate (CARAFATE) 1 GM tablet, Take 1 tablet by mouth 4 times daily for 14 days, Disp: 56 tablet, Rfl: 0    vitamin D (CHOLECALCIFEROL) 25 MCG (1000 UT) TABS tablet, Take 1,000 Units by mouth daily, Disp: , Rfl:     ondansetron (ZOFRAN-ODT) 4 MG disintegrating tablet, dissolve 1 tablet by mouth every 8 hours if needed for nausea and vomiting, Disp: 10 tablet, Rfl: 0    lactulose (CHRONULAC) 10 GM/15ML solution, take 30 milliliters by mouth three times a day, Disp: 473 mL, Rfl: 1    nadolol (CORGARD) 20 MG tablet, take 1 tablet by mouth once daily, Disp: , Rfl:     FLUoxetine (PROZAC) 20 MG capsule, Take 1 capsule by mouth daily, Disp: 30 capsule, Rfl: 3    atorvastatin (LIPITOR) 20 MG tablet, Take 1 tablet by mouth nightly, Disp: 30 tablet, Rfl: 3    midodrine (PROAMATINE) 5 MG tablet, Take 1 tablet by mouth 3 times daily as needed (SBP <110), Disp: 90 tablet, Rfl: 3    ferrous sulfate (IRON 325) 325 (65 Fe) MG tablet, Take 1 tablet by mouth 2 times daily, Disp: 60 tablet, Rfl: 5    ALLERGIES:   No Known Allergies    FAMILY HISTORY:       Problem Relation Age of Onset    Cancer Mother         pancreatic    Cancer Father         bone    Diabetes Sister     Asthma Brother          SOCIAL HISTORY:   Social History     Socioeconomic History    Marital status: Single     Spouse name: Not on file    Number of children: Not on file    Years of education: Not on file   Zannie Cowden Highest education level: Not on file   Occupational History    Not on file   Tobacco Use    Smoking status: Former Smoker     Packs/day: 1.00     Years: 45.00     Pack years: 45.00     Quit date: 2017     Years since quittin.4    Smokeless tobacco: Never Used   Vaping Use    Vaping Use: Never used   Substance and Sexual Activity    Alcohol use: Not Currently     Comment: quit     Drug use: Not Currently     Frequency: 1.0 times per week     Comment: cocaine,  stopped spring 2016    Sexual activity: Yes     Partners: Female   Other Topics Concern    Not on file   Social History Narrative     in the past, retired     Social Determinants of Health     Financial Resource Strain: [address] Difficulty of Paying Living Expenses: Not hard at all   Food Insecurity: No Food Insecurity    Worried About [address] [place] [address] in the Last Year: Never true   Chin Mirza in the Last Year: Never true   Transportation Needs:     Lack of Transportation (Medical): Not on file    Lack of Transportation (Non-Medical):  Not on file   Physical Activity:     Days of Exercise per Week: Not on file    Minutes of Exercise per Session: Not on file   Stress:     Feeling of Stress : Not on file   Social Connections:     Frequency of Communication with Friends and Family: Not on file    Frequency of Social Gatherings with Friends and Family: Not on file    Attends [employer] Services: Not on file    Active Member of [address] [place] [address] or Organizations: Not on file    Attends Club or Organization Meetings: Not on file    Marital Status: Not on file   Intimate Partner Violence:     Fear of Current or Ex-Partner: Not on file    Emotionally Abused: Not on file    Physically Abused: Not on file    Sexually Abused: Not on file   Housing Stability:     Unable to Pay for Housing in the Last Year: Not on file    Number of [place] in the Last Year: Not on file    Unstable Housing in the Last Year: Not on file REVIEW OF SYSTEMS:         Review of Systems   Constitutional: Negative. HENT: Negative. Eyes: Positive for visual disturbance (glasses ). Respiratory: Negative. Cardiovascular: Negative. Gastrointestinal: Positive for abdominal distention. Endocrine: Negative. Genitourinary: Negative. Musculoskeletal: Negative. Skin: Negative. Allergic/Immunologic: Negative. Neurological: Negative. Hematological: Bruises/bleeds easily. Psychiatric/Behavioral: Negative. PHYSICAL EXAMINATION:   Patient is awake, alert and oriented. Abdomen is distended with ascites. No tenderness. Bowel sounds active. No external hernias. Extremities no edema. Cardiovascular pulmonary exams nonspecific. Vital signs reviewed per the nursing documentation. /81   Pulse 82   There is no height or weight on file to calculate BMI.    Physical Exam      LABORATORY DATA: Reviewed  Lab Results   Component Value Date    WBC 10.2 06/13/2022    HGB 10.0 (L) 06/13/2022    HCT 32.2 (L) 06/13/2022    MCV 90.7 06/13/2022     06/13/2022     (L) 06/13/2022    K 3.8 06/13/2022     06/13/2022    CO2 23 06/13/2022    BUN 7 (L) 06/13/2022    CREATININE 0.58 (L) 06/13/2022    LABPROT 7.7 04/19/2012    LABALBU 2.8 (L) 06/09/2022    BILITOT 1.53 (H) 06/09/2022    ALKPHOS 76 06/09/2022    AST 15 06/09/2022    ALT 6 06/09/2022    INR 1.4 06/07/2022         Lab Results   Component Value Date    RBC 3.55 (L) 06/13/2022    HGB 10.0 (L) 06/13/2022    MCV 90.7 06/13/2022    MCH 28.3 06/13/2022    MCHC 31.2 06/13/2022    RDW 17.2 (H) 06/13/2022    MPV 7.1 06/13/2022    BASOPCT 1 06/13/2022    LYMPHSABS 1.00 06/13/2022    MONOSABS 1.30 06/13/2022    NEUTROABS 7.80 06/13/2022    EOSABS 0.10 06/13/2022    BASOSABS 0.10 06/13/2022         DIAGNOSTIC TESTING:     XR LUMBAR SPINE (2-3 VIEWS)    Result Date: 6/12/2022  EXAMINATION: THREE XRAY VIEWS OF THE LUMBAR SPINE 6/10/2022 11:24 am COMPARISON: CT abdomen pelvis 04/25/2022. HISTORY: ORDERING SYSTEM PROVIDED HISTORY: Lumbar spondylosis TECHNOLOGIST PROVIDED HISTORY: evaluate for instability FINDINGS: Standing neutral, flexion and extension views are provided. 5 lumbar type vertebral bodies are assumed for this exam.  The moderate anterior wedging of T12 appears unchanged. Lumbar vertebral body heights are maintained. Multilevel degenerative disc disease and advanced lower lumbar facet arthropathy with relative sparing of L2-3 again demonstrated. No listhesis or evidence for dynamic instability. Extensive calcified atheromatous plaque is present. Calcified splenic artery aneurysm again demonstrated. 1.  No listhesis or evidence for dynamic instability. 2.  Advanced multilevel degenerative disc disease and lower lumbar facet arthropathy. 3.  Chronic compression deformity of T12. No new osseous abnormality appreciated. IR US GUIDED PARACENTESIS    Result Date: 6/8/2022  PROCEDURE: PARACENTESIS WITHOUT IMAGE GUIDANCE US ABDOMEN LIMITED 6/8/2022 HISTORY: ORDERING SYSTEM PROVIDED HISTORY: ascites TECHNOLOGIST PROVIDED HISTORY: ascites TECHNIQUE: Informed consent was obtained after a detailed explanation of the procedure including risks and benefits. Universal protocol was followed. A limited ultrasound of the abdomen was performed. The right abdomen was prepped and draped in sterile fashion, and local anesthesia was achieved with 1% lidocaine. A 5 Divehi needle sheath was advanced into ascites, and paracentesis was performed. A total of 11.8 L of translucent yellow fluid was aspirated. The patient tolerated the procedure well. FINDINGS: Limited abdominal ultrasound yielding a large amount of ascites. Successful paracentesis. Intravenous albumin administration is recommended. Assessment  1. Ascites due to alcoholic cirrhosis [facility])        Plan  Patient needs electrolytes and CBC done today.     Advised to bring all his medications so that we can review the dosages and readjust the medications. Advised to see me in the next 24 to 48 hours. After discussion both patient and wife understood agreed. Thank you for allowing me to participate in the care of Mr. Kevyn Garcia. For any further questions please do not hesitate to contact me. I have reviewed and agree with the ROS entered by the MA/LPN. Note is dictated utilizing voice recognition software. Unfortunately this leads to occasional typographical errors.  Please contact our office if you have any questions        Yohannes Ramirez MD,FACP, [facility]  Board Certified in Gastroenterology and [address] [place] [address] Gastroenterology  Office #: [phone]

## 2022-06-22 ENCOUNTER — TELEPHONE (OUTPATIENT)
Dept: PRIMARY CARE CLINIC | Age: 62
End: 2022-06-22

## 2022-06-22 ENCOUNTER — HOSPITAL ENCOUNTER (OUTPATIENT)
Age: 62
Discharge: HOME | End: 2022-06-22
Payer: MEDICARE

## 2022-06-22 DIAGNOSIS — K70.31 ASCITES DUE TO ALCOHOLIC CIRRHOSIS (HCC): ICD-10-CM

## 2022-06-22 LAB
ALBUMIN SERPL-MCNC: 2.8 G/DL (ref 3.5–5.2)
ALP BLD-CCNC: 237 U/L (ref 40–129)
ALT SERPL-CCNC: 20 U/L (ref 5–41)
ANION GAP SERPL CALCULATED.3IONS-SCNC: 11 MMOL/L (ref 9–17)
AST SERPL-CCNC: 45 U/L
BILIRUB SERPL-MCNC: 1.12 MG/DL (ref 0.3–1.2)
BUN BLDV-MCNC: 7 MG/DL (ref 8–23)
CALCIUM SERPL-MCNC: 8.5 MG/DL (ref 8.6–10.4)
CHLORIDE BLD-SCNC: 99 MMOL/L (ref 98–107)
CO2: 24 MMOL/L (ref 20–31)
CREAT SERPL-MCNC: 0.63 MG/DL (ref 0.7–1.2)
GFR AFRICAN AMERICAN: >60 ML/MIN
GFR NON-AFRICAN AMERICAN: >60 ML/MIN
GFR SERPL CREATININE-BSD FRML MDRD: ABNORMAL ML/MIN/{1.73_M2}
GLUCOSE BLD-MCNC: 113 MG/DL (ref 70–99)
HCT VFR BLD CALC: 34.9 % (ref 41–53)
HCT VFR BLD CALC: 35.1 % (ref 41–53)
HEMOGLOBIN: 11.2 G/DL (ref 13.5–17.5)
HEMOGLOBIN: 11.3 G/DL (ref 13.5–17.5)
LIPASE: 18 U/L (ref 13–60)
MCH RBC QN AUTO: 29.1 PG (ref 26–34)
MCHC RBC AUTO-ENTMCNC: 32.4 G/DL (ref 31–37)
MCV RBC AUTO: 89.7 FL (ref 80–100)
PDW BLD-RTO: 17.5 % (ref 11.5–14.9)
PLATELET # BLD: 275 K/UL (ref 150–450)
PMV BLD AUTO: 6.5 FL (ref 6–12)
POTASSIUM SERPL-SCNC: 3.8 MMOL/L (ref 3.7–5.3)
RBC # BLD: 3.89 M/UL (ref 4.5–5.9)
SODIUM BLD-SCNC: 134 MMOL/L (ref 135–144)
TOTAL PROTEIN: 5.5 G/DL (ref 6.4–8.3)
WBC # BLD: 6.5 K/UL (ref 3.5–11)

## 2022-06-22 PROCEDURE — 83690 ASSAY OF LIPASE: CPT

## 2022-06-22 PROCEDURE — 80053 COMPREHEN METABOLIC PANEL: CPT

## 2022-06-22 PROCEDURE — 85018 HEMOGLOBIN: CPT

## 2022-06-22 PROCEDURE — 85027 COMPLETE CBC AUTOMATED: CPT

## 2022-06-22 PROCEDURE — 85014 HEMATOCRIT: CPT

## 2022-06-22 PROCEDURE — 36415 COLL VENOUS BLD VENIPUNCTURE: CPT

## 2022-06-23 ENCOUNTER — OFFICE VISIT (OUTPATIENT)
Dept: GASTROENTEROLOGY | Age: 62
End: 2022-06-23
Payer: MEDICARE

## 2022-06-23 VITALS
WEIGHT: 200 LBS | DIASTOLIC BLOOD PRESSURE: 80 MMHG | SYSTOLIC BLOOD PRESSURE: 119 MMHG | HEART RATE: 73 BPM | BODY MASS INDEX: 28.7 KG/M2

## 2022-06-23 DIAGNOSIS — K70.30 ALCOHOLIC CIRRHOSIS, UNSPECIFIED WHETHER ASCITES PRESENT (HCC): ICD-10-CM

## 2022-06-23 DIAGNOSIS — B18.2 HEP C W/O COMA, CHRONIC (HCC): Primary | ICD-10-CM

## 2022-06-23 DIAGNOSIS — R18.8 OTHER ASCITES: ICD-10-CM

## 2022-06-23 DIAGNOSIS — D64.9 ANEMIA, UNSPECIFIED TYPE: ICD-10-CM

## 2022-06-23 DIAGNOSIS — Z09 HOSPITAL DISCHARGE FOLLOW-UP: ICD-10-CM

## 2022-06-23 DIAGNOSIS — D69.6 THROMBOCYTOPENIA (HCC): ICD-10-CM

## 2022-06-23 PROCEDURE — 99214 OFFICE O/P EST MOD 30 MIN: CPT | Performed by: INTERNAL MEDICINE

## 2022-06-23 RX ORDER — FUROSEMIDE 20 MG/1
20 TABLET ORAL 2 TIMES DAILY
Qty: 60 TABLET | Refills: 3 | Status: SHIPPED | OUTPATIENT
Start: 2022-06-23

## 2022-06-23 RX ORDER — SPIRONOLACTONE 100 MG/1
100 TABLET, FILM COATED ORAL EVERY EVENING
Qty: 30 TABLET | Refills: 1 | Status: SHIPPED | OUTPATIENT
Start: 2022-06-23

## 2022-06-23 ASSESSMENT — ENCOUNTER SYMPTOMS
RESPIRATORY NEGATIVE: 1
ALLERGIC/IMMUNOLOGIC NEGATIVE: 1
ABDOMINAL DISTENTION: 1

## 2022-06-23 NOTE — PROGRESS NOTES
GI OFFICE FOLLOW UP    INTERVAL HISTORY:   No referring provider defined for this encounter. Chief Complaint   Patient presents with    Cirrhosis     pt is here to f/u on labs. pt needs Dr to verify correct doses        1. Hep C w/o coma, chronic (HCC)    2. Anemia, unspecified type    3. Hospital discharge follow-up    4. Thrombocytopenia (HCC)    5. Other ascites    6. Alcoholic cirrhosis, unspecified whether ascites present ([facility]              HISTORY OF PRESENT ILLNESS:   Patient seen along with his wife. Complains of abdominal discomfort back pain. No nausea vomiting. No melanotic stools. No fever chills. He is awake, alert and oriented. No signs of confusion. Medications reviewed. He is taking different schedules and different dosages as far as the diuretics are concerned. Labs reviewed and are satisfactory. Past Medical,Family, and Social History reviewed and does contribute to the patient presenting condition. Patient's PMH/PSH,SH,PSYCH Hx, MEDs, ALLERGIES, and ROS were all reviewed and updated in the appropriate sections.  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 Sonia Bloom, orthopedic, every 3-4 months, gets steroid injection    Lezama esophagus     BPH (benign prostatic hypertrophy)     Cholelithiasis     Cirrhosis ([address]     COVID-19 12/2020    pt reports he had a positive test while at [facility] in 2020, was asymptomatic    COVID-19 vaccine series completed 5/20/2021, 6/22/2021    Moderna 5/20/2021, 6/22/2021    DDD (degenerative disc disease), lumbar     Depression     Esophageal cancer ([facility]     INVASIVE ADENOCARCINOMA ARISING IN TUBULAR ADENOMA WITH HIGH GRADE DYSPLASIA, ASSOCIATED WITH FOCAL INTESTINAL METAPLASIA     Esophageal varices ([address]     Fatty liver     GERD (gastroesophageal reflux disease)     Hep C w/o coma, chronic (HCC)     History of alcohol abuse     6-12 beers a day; quit drinking July 2016    History of blood transfusion     History of colon polyps 2016    History of tobacco abuse     [place] (hard of hearing)     Hyperlipidemia     Hypertension     Port-A-Cath in place     right upper chest    Sciatica     Spinal stenosis     Stomach ulcer     hx of    Tubular adenoma of colon 2016, 2018    Vitamin D deficiency     Wears glasses        Past Surgical History:   Procedure Laterality Date    BUNIONECTOMY      twice on right side    BUNIONECTOMY Left     CARPAL TUNNEL RELEASE Right     COLONOSCOPY      at age 35    COLONOSCOPY  10/05/2016    polyps-pathology tubular adenoma, and abnormal looking mucosa right colon-pathology-tubular adenoma    COLONOSCOPY N/A 3/30/2018    COLONOSCOPY POLYPECTOMY COLD BIOPSY performed by Jamaica Lindquist MD at [address]  03/30/2018    Small polyp in the sigmoid colon and excised with biopsy forceps--tubular adenoma    COLONOSCOPY N/A 4/16/2022    COLONOSCOPY POLYPECTOMY performed by Jamaica Lindquist MD at [address], COLON, DIAGNOSTIC      EGD    IR PORT PLACEMENT EQUAL OR GREATER THAN 5 YEARS  4/19/2021    IR PORT PLACEMENT EQUAL OR GREATER THAN 5 YEARS 4/19/2021 [facility] PROCED[facility]    KNEE SURGERY Left     cyst removed    NASAL SEPTUM SURGERY      NERVE BLOCK Right 11/23/2020    NERVE BLOCK RIGHT CERVICAL STEROID INJECTION  C3-C6 performed by Lolis Yu MD at [facility]  01/04/16    steroid injection C7 T1    OTHER SURGICAL HISTORY  11/21/2016    Bilateral Lumbar CACHORRO L4-L5 injections    OTHER SURGICAL HISTORY  12/19/2016    lumbar steroid injection    OTHER SURGICAL HISTORY  09/28/2018    BILATERAL L5 CACHORRO (N/A Back)    OTHER SURGICAL HISTORY Right 11/23/2020    cervical injection    PAIN MANAGEMENT PROCEDURE Left 7/9/2020    EPIDURAL STEROID INJECTION LEFT L4 L5 performed by Ezekiel Walters MD at [facility] Left 7/20/2020    LEFT L4 L5 EPIDURAL STEROID INJECTION performed by Ezekiel Walters MD at [facility] Bilateral 8/17/2020    LUMBAR FACET BILATERAL L2-L5 performed by Ezekiel Walters MD at [facility] Bilateral 12/7/2020    NERVE BLOCK BILATERAL LUMBAR MEDIAL BRANCH L2-L5 performed by Ezekiel Walters MD at [address] AA&/STRD TFRML EPI LUMBAR/SACRAL 1 LEVEL Bilateral 9/6/2018    BILATERAL L5 CACHORRO performed by Ezekiel Walters MD at [address] AA&/STRD TFRML EPI LUMBAR/SACRAL 1 LEVEL N/A 9/28/2018    BILATERAL L5 CACHORRO performed by Ezekiel Walters MD at [facility] N/CARPAL TUNNEL SURG Right 8/29/2017    CARPAL TUNNEL RELEASE RIGHT performed by Alyse Bolaños MD at [address] [facility] N/CARPAL TUNNEL SURG Left 10/31/2017    CARPAL TUNNEL RELEASE performed by Alyse Bolaños MD at [facility] 12/29/2020    EGD BIOPSY performed by Juan Alberto Chung MD at [place] [address] N/A 2/2/2021    EGD BIOPSY and spot marking performed by Sandra Hernández MD at [place] [address] 2/12/2021    ENDOSCOPIC ULTRASOUND, EGD performed by Lindsay Holden MD at [address] [place] [address]  2/12/2021    EGD DIAGNOSTIC ONLY performed by Lindsay Holden MD at [address] [place] [address] N/A 8/31/2021    EGD BIOPSY performed by Sandra Hernández MD at [place] [address] 1/21/2022    EGD BIOPSY performed by Sandra Hernández MD at [place] [address] 4/15/2022    EGD ESOPHAGOGASTRODUODENOSCOPY performed by Juan Alberto Chung MD at [facility] 6/6/2022    EGD BAND LIGATION performed by Marcie Hubbard MD at [address] [place] [address] N/A 6/9/2022    EGD ESOPHAGOGASTRODUODENOSCOPY performed by Marcie Hubbard MD at [facility]:    Current Outpatient Medications:     furosemide (LASIX) 20 MG tablet, Take 1 tablet by mouth 2 times daily, Disp: 60 tablet, Rfl: 3    spironolactone (ALDACTONE) 100 MG tablet, Take 1 tablet by mouth every evening, Disp: 30 tablet, Rfl: 1    pantoprazole (PROTONIX) 40 MG tablet, Take 1 tablet by mouth 2 times daily for 14 days, Disp: 28 tablet, Rfl: 0    sucralfate (CARAFATE) 1 GM tablet, Take 1 tablet by mouth 4 times daily for 14 days, Disp: 56 tablet, Rfl: 0    vitamin D (CHOLECALCIFEROL) 25 MCG (1000 UT) TABS tablet, Take 1,000 Units by mouth daily, Disp: , Rfl:     ondansetron (ZOFRAN-ODT) 4 MG disintegrating tablet, dissolve 1 tablet by mouth every 8 hours if needed for nausea and vomiting, Disp: 10 tablet, Rfl: 0    lactulose (CHRONULAC) 10 GM/15ML solution, take 30 milliliters by mouth three times a day, Disp: 473 mL, Rfl: 1    nadolol (CORGARD) 20 MG tablet, take 1 tablet by mouth once daily, Disp: , Rfl:     FLUoxetine (PROZAC) 20 MG capsule, Take 1 capsule by mouth daily, Disp: 30 capsule, Rfl: 3    atorvastatin (LIPITOR) 20 MG tablet, Take 1 tablet by mouth nightly, Disp: 30 tablet, Rfl: 3    midodrine (PROAMATINE) 5 MG tablet, Take 1 tablet by mouth 3 times daily as needed (SBP <110), Disp: 90 tablet, Rfl: 3    ferrous sulfate (IRON 325) 325 (65 Fe) MG tablet, Take 1 tablet by mouth 2 times daily, Disp: 60 tablet, Rfl: 5    ALLERGIES:   No Known Allergies    FAMILY HISTORY:       Problem Relation Age of Onset    Cancer Mother         pancreatic    Cancer Father         bone    Diabetes Sister     Asthma Brother          SOCIAL HISTORY:   Social History     Socioeconomic History    Marital status: Single     Spouse name: Not on file    Number of children: Not on file    Years of education: Not on file    Highest education level: Not on file   Occupational History    Not on file   Tobacco Use    Smoking status: Former Smoker     Packs/day: 1.00     Years: 45.00     Pack years: 45.00     Quit date: 2017     Years since quittin.4    Smokeless tobacco: Never Used   Vaping Use    Vaping Use: Never used   Substance and Sexual Activity    Alcohol use: Not Currently     Comment: quit     Drug use: Not Currently     Frequency: 1.0 times per week     Comment: cocaine,  stopped spring 2016    Sexual activity: Yes     Partners: Female   Other Topics Concern    Not on file   Social History Narrative     in the past, retired     Social Determinants of Health     Financial Resource Strain: [address] Difficulty of Paying Living Expenses: Not hard at all   Food Insecurity: No Food Insecurity    Worried About [facility] in the Last Year: Never true    [address] [employer] in the Last Year: Never true   Transportation Needs:     Lack of Transportation (Medical): Not on file    Lack of Transportation (Non-Medical):  Not on file   Physical Activity:     Days of Exercise per Week: Not on file    Minutes of Exercise per Session: Not on file   Stress:     Feeling of Stress : Not on file   Social Connections:     Frequency of Communication with Friends and Family: Not on file    Frequency of Social Gatherings with Friends and Family: Not on file    Attends Orthodoxy Services: Not on file    Active Member of Clubs or Organizations: Not on file    Attends Club or Organization Meetings: Not on file    Marital Status: Not on file   Intimate Partner Violence:     Fear of Current or Ex-Partner: Not on file    Emotionally Abused: Not on file    Physically Abused: Not on file    Sexually Abused: Not on file   Housing Stability:     Unable to Pay for Housing in the Last Year: Not on file    Number of [place] in the Last Year: Not on file    Unstable Housing in the Last Year: Not on file         REVIEW OF SYSTEMS:         Review of Systems   Constitutional: Negative. HENT: Negative. Eyes: Positive for visual disturbance (glasses ). Respiratory: Negative. Cardiovascular: Negative. Gastrointestinal: Positive for abdominal distention. Endocrine: Negative. Genitourinary: Negative. Musculoskeletal: Negative. Skin: Negative. Allergic/Immunologic: Negative. Neurological: Negative. Hematological: Bruises/bleeds easily. Psychiatric/Behavioral: Negative. PHYSICAL EXAMINATION:     Vital signs reviewed per the nursing documentation. /80   Pulse 73   Wt 200 lb (90.7 kg)   BMI 28.70 kg/m²   Body mass index is 28.7 kg/m². Physical Exam  Vitals and nursing note reviewed. Constitutional:       General: He is not in acute distress. Appearance: He is well-developed. HENT:      Head: Normocephalic and atraumatic. Mouth/Throat:      Pharynx: No oropharyngeal exudate. Eyes:      General: No scleral icterus. Conjunctiva/sclera: Conjunctivae normal.      Pupils: Pupils are equal, round, and reactive to light. Neck:      Thyroid: No thyromegaly. Trachea: No tracheal deviation. Cardiovascular:      Rate and Rhythm: Normal rate and regular rhythm. Heart sounds: Normal heart sounds. No murmur heard. Pulmonary:      Effort: Pulmonary effort is normal. No respiratory distress. Breath sounds: Normal breath sounds. No wheezing or rales. Abdominal:      General: Bowel sounds are normal. There is no distension. Palpations: Abdomen is soft. There is no hepatomegaly or mass. Tenderness: There is no abdominal tenderness. There is no guarding. Hernia: No hernia is present. Comments:  peripheral signs of ch. Liver disease. Has abdominal distention secondary to ascites. Bowel sounds present.    Genitourinary:     Rectum: Normal.   Musculoskeletal:      Cervical and advanced lower lumbar facet arthropathy with relative sparing of L2-3 again demonstrated. No listhesis or evidence for dynamic instability. Extensive calcified atheromatous plaque is present. Calcified splenic artery aneurysm again demonstrated. 1.  No listhesis or evidence for dynamic instability. 2.  Advanced multilevel degenerative disc disease and lower lumbar facet arthropathy. 3.  Chronic compression deformity of T12. No new osseous abnormality appreciated. IR US GUIDED PARACENTESIS    Result Date: 6/8/2022  PROCEDURE: PARACENTESIS WITHOUT IMAGE GUIDANCE US ABDOMEN LIMITED 6/8/2022 HISTORY: ORDERING SYSTEM PROVIDED HISTORY: ascites TECHNOLOGIST PROVIDED HISTORY: ascites TECHNIQUE: Informed consent was obtained after a detailed explanation of the procedure including risks and benefits. Universal protocol was followed. A limited ultrasound of the abdomen was performed. The right abdomen was prepped and draped in sterile fashion, and local anesthesia was achieved with 1% lidocaine. A 5 Portuguese needle sheath was advanced into ascites, and paracentesis was performed. A total of 11.8 L of translucent yellow fluid was aspirated. The patient tolerated the procedure well. FINDINGS: Limited abdominal ultrasound yielding a large amount of ascites. Successful paracentesis. Intravenous albumin administration is recommended. Assessment  1. Hep C w/o coma, chronic (HCC)    2. Anemia, unspecified type    3. Hospital discharge follow-up    4. Thrombocytopenia (HCC)    5. Other ascites    6. Alcoholic cirrhosis, unspecified whether ascites present [facility])        Plan  Patient looks stable. Labs reviewed satisfactory. Hemoglobin stable. Patient medication reviewed.   New medication schedule Lasix 20 mg twice a day    Spironolactone 100 mg a day    Labs in the next 7 days to see in the office in the next 10 days    Patient requested abdominal paracentesis as he is having discomfort in the abdomen and also has a back pain. Advised paracentesis carefully as an outpatient. If he has any issues to contact me or come to the office    Thank you for allowing me to participate in the care of Mr. Danae Baca. For any further questions please do not hesitate to contact me. I have reviewed and agree with the ROS entered by the MA/LPN. Note is dictated utilizing voice recognition software. Unfortunately this leads to occasional typographical errors.  Please contact our office if you have any questions        Barbara Alvarez MD,FACP, [facility]  Board Certified in Gastroenterology and [address] [place] [address] Gastroenterology  Office #: [phone]

## 2022-06-27 ENCOUNTER — TELEPHONE (OUTPATIENT)
Dept: GASTROENTEROLOGY | Age: 62
End: 2022-06-27

## 2022-06-27 ENCOUNTER — TELEPHONE (OUTPATIENT)
Dept: INTERVENTIONAL RADIOLOGY/VASCULAR | Age: 62
End: 2022-06-27

## 2022-06-27 NOTE — TELEPHONE ENCOUNTER
Patient called the clinical line and then was transferred to the surgery line,pt yelling \" come on  your phone. \" pt is attempting to schedule a paracentesis. We do not schedule these as schedulers in the office. Office has already left a voicemail prior to this encounter today and we provided the IR scheduling line to the patient. Paracentesis was ordered on 6/23. Pt needs to contact [phone] . All of this information was left on the phone of the patient explaining we do not schedule these in the office. This is done in a hospital setting.

## 2022-06-27 NOTE — TELEPHONE ENCOUNTER
Patient LVM, stating he needs to schedule paracentesis, office returned call and had to leave the pt a return message. Gave pt the number to IR to schedule.  [phone]

## 2022-06-28 ENCOUNTER — HOSPITAL ENCOUNTER (OUTPATIENT)
Dept: INTERVENTIONAL RADIOLOGY/VASCULAR | Age: 62
Discharge: HOME | End: 2022-06-30
Payer: MEDICARE

## 2022-06-28 VITALS
SYSTOLIC BLOOD PRESSURE: 103 MMHG | TEMPERATURE: 97.1 F | OXYGEN SATURATION: 98 % | HEART RATE: 79 BPM | DIASTOLIC BLOOD PRESSURE: 55 MMHG | RESPIRATION RATE: 14 BRPM

## 2022-06-28 DIAGNOSIS — R18.8 OTHER ASCITES: ICD-10-CM

## 2022-06-28 PROCEDURE — 2709999900 IR US GUIDED PARACENTESIS

## 2022-06-28 PROCEDURE — 7100000010 HC PHASE II RECOVERY - FIRST 15 MIN

## 2022-06-28 PROCEDURE — P9047 ALBUMIN (HUMAN), 25%, 50ML: HCPCS | Performed by: RADIOLOGY

## 2022-06-28 PROCEDURE — 7100000011 HC PHASE II RECOVERY - ADDTL 15 MIN

## 2022-06-28 PROCEDURE — 6360000002 HC RX W HCPCS: Performed by: RADIOLOGY

## 2022-06-28 PROCEDURE — 49083 ABD PARACENTESIS W/IMAGING: CPT

## 2022-06-28 RX ORDER — SODIUM CHLORIDE 9 MG/ML
INJECTION, SOLUTION INTRAVENOUS CONTINUOUS
Status: DISCONTINUED | OUTPATIENT
Start: 2022-06-28 | End: 2022-07-01 | Stop reason: HOSPADM

## 2022-06-28 RX ORDER — ALBUMIN (HUMAN) 12.5 G/50ML
100 SOLUTION INTRAVENOUS ONCE
Status: COMPLETED | OUTPATIENT
Start: 2022-06-28 | End: 2022-06-28

## 2022-06-28 RX ORDER — SODIUM CHLORIDE 0.9 % (FLUSH) 0.9 %
5-40 SYRINGE (ML) INJECTION PRN
Status: DISCONTINUED | OUTPATIENT
Start: 2022-06-28 | End: 2022-07-01 | Stop reason: HOSPADM

## 2022-06-28 RX ORDER — ACETAMINOPHEN 325 MG/1
650 TABLET ORAL EVERY 4 HOURS PRN
Status: DISCONTINUED | OUTPATIENT
Start: 2022-06-28 | End: 2022-07-01 | Stop reason: HOSPADM

## 2022-06-28 RX ORDER — SODIUM CHLORIDE 9 MG/ML
INJECTION, SOLUTION INTRAVENOUS PRN
Status: DISCONTINUED | OUTPATIENT
Start: 2022-06-28 | End: 2022-07-01 | Stop reason: HOSPADM

## 2022-06-28 RX ORDER — SODIUM CHLORIDE 0.9 % (FLUSH) 0.9 %
5-40 SYRINGE (ML) INJECTION EVERY 12 HOURS SCHEDULED
Status: DISCONTINUED | OUTPATIENT
Start: 2022-06-28 | End: 2022-07-01 | Stop reason: HOSPADM

## 2022-06-28 RX ADMIN — ALBUMIN (HUMAN) 100 G: 0.25 INJECTION, SOLUTION INTRAVENOUS at 15:58

## 2022-06-28 ASSESSMENT — PAIN - FUNCTIONAL ASSESSMENT
PAIN_FUNCTIONAL_ASSESSMENT: NONE - DENIES PAIN
PAIN_FUNCTIONAL_ASSESSMENT: NONE - DENIES PAIN

## 2022-06-28 ASSESSMENT — ENCOUNTER SYMPTOMS
SHORTNESS OF BREATH: 0
COLOR CHANGE: 1
BACK PAIN: 0
APNEA: 0
ROS SKIN COMMENTS: SCATTERED BRUISING.
WHEEZING: 0
SORE THROAT: 0
ABDOMINAL DISTENTION: 1
CHEST TIGHTNESS: 0
SINUS PRESSURE: 0
SINUS PAIN: 0
TROUBLE SWALLOWING: 0
COUGH: 0
RHINORRHEA: 0

## 2022-06-28 NOTE — PROGRESS NOTES
Patient tolerated paracentesis without distress. 80298 ml of clear yellow fluid removed. Dry dressing to site. Patient returned to room. Nurse updated.

## 2022-06-29 ENCOUNTER — HOSPITAL ENCOUNTER (OUTPATIENT)
Age: 62
Discharge: HOME | End: 2022-06-29
Payer: MEDICARE

## 2022-06-29 DIAGNOSIS — D69.6 THROMBOCYTOPENIA (HCC): ICD-10-CM

## 2022-06-29 DIAGNOSIS — D64.9 ANEMIA, UNSPECIFIED TYPE: ICD-10-CM

## 2022-06-29 DIAGNOSIS — B18.2 HEP C W/O COMA, CHRONIC (HCC): ICD-10-CM

## 2022-06-29 DIAGNOSIS — K70.30 ALCOHOLIC CIRRHOSIS, UNSPECIFIED WHETHER ASCITES PRESENT (HCC): ICD-10-CM

## 2022-06-29 DIAGNOSIS — R18.8 OTHER ASCITES: ICD-10-CM

## 2022-06-29 LAB
ABSOLUTE EOS #: 0 K/UL (ref 0–0.4)
ABSOLUTE LYMPH #: 0.6 K/UL (ref 1–4.8)
ABSOLUTE MONO #: 0.7 K/UL (ref 0.1–1.3)
ALBUMIN SERPL-MCNC: 3.7 G/DL (ref 3.5–5.2)
ALP BLD-CCNC: 153 U/L (ref 40–129)
ALT SERPL-CCNC: 15 U/L (ref 5–41)
ANION GAP SERPL CALCULATED.3IONS-SCNC: 9 MMOL/L (ref 9–17)
AST SERPL-CCNC: 43 U/L
BASOPHILS # BLD: 0 % (ref 0–2)
BASOPHILS ABSOLUTE: 0 K/UL (ref 0–0.2)
BILIRUB SERPL-MCNC: 1.43 MG/DL (ref 0.3–1.2)
BUN BLDV-MCNC: 6 MG/DL (ref 8–23)
CALCIUM SERPL-MCNC: 8.7 MG/DL (ref 8.6–10.4)
CHLORIDE BLD-SCNC: 98 MMOL/L (ref 98–107)
CO2: 25 MMOL/L (ref 20–31)
CREAT SERPL-MCNC: 0.55 MG/DL (ref 0.7–1.2)
EOSINOPHILS RELATIVE PERCENT: 0 % (ref 0–4)
GFR AFRICAN AMERICAN: >60 ML/MIN
GFR NON-AFRICAN AMERICAN: >60 ML/MIN
GFR SERPL CREATININE-BSD FRML MDRD: ABNORMAL ML/MIN/{1.73_M2}
GLUCOSE BLD-MCNC: 118 MG/DL (ref 70–99)
HCT VFR BLD CALC: 32.1 % (ref 41–53)
HCT VFR BLD CALC: 32.1 % (ref 41–53)
HEMOGLOBIN: 10.4 G/DL (ref 13.5–17.5)
HEMOGLOBIN: 10.4 G/DL (ref 13.5–17.5)
LYMPHOCYTES # BLD: 11 % (ref 24–44)
MCH RBC QN AUTO: 29.3 PG (ref 26–34)
MCHC RBC AUTO-ENTMCNC: 32.3 G/DL (ref 31–37)
MCV RBC AUTO: 90.8 FL (ref 80–100)
MONOCYTES # BLD: 12 % (ref 1–7)
PDW BLD-RTO: 17.8 % (ref 11.5–14.9)
PLATELET # BLD: 135 K/UL (ref 150–450)
PMV BLD AUTO: 7 FL (ref 6–12)
POTASSIUM SERPL-SCNC: 4.3 MMOL/L (ref 3.7–5.3)
RBC # BLD: 3.53 M/UL (ref 4.5–5.9)
SEG NEUTROPHILS: 77 % (ref 36–66)
SEGMENTED NEUTROPHILS ABSOLUTE COUNT: 4.3 K/UL (ref 1.3–9.1)
SODIUM BLD-SCNC: 132 MMOL/L (ref 135–144)
TOTAL PROTEIN: 5.7 G/DL (ref 6.4–8.3)
WBC # BLD: 5.7 K/UL (ref 3.5–11)

## 2022-06-29 PROCEDURE — 85014 HEMATOCRIT: CPT

## 2022-06-29 PROCEDURE — 36415 COLL VENOUS BLD VENIPUNCTURE: CPT

## 2022-06-29 PROCEDURE — 85025 COMPLETE CBC W/AUTO DIFF WBC: CPT

## 2022-06-29 PROCEDURE — 80053 COMPREHEN METABOLIC PANEL: CPT

## 2022-06-29 PROCEDURE — 85018 HEMOGLOBIN: CPT

## 2022-07-05 ENCOUNTER — HOSPITAL ENCOUNTER (OUTPATIENT)
Age: 62
Discharge: HOME | End: 2022-07-05
Payer: MEDICARE

## 2022-07-05 DIAGNOSIS — D64.9 ANEMIA, UNSPECIFIED TYPE: ICD-10-CM

## 2022-07-05 LAB
HCT VFR BLD CALC: 28.1 % (ref 41–53)
HEMOGLOBIN: 9.1 G/DL (ref 13.5–17.5)

## 2022-07-05 PROCEDURE — 85014 HEMATOCRIT: CPT

## 2022-07-05 PROCEDURE — 85018 HEMOGLOBIN: CPT

## 2022-07-05 PROCEDURE — 36415 COLL VENOUS BLD VENIPUNCTURE: CPT

## 2022-07-06 ENCOUNTER — HOSPITAL ENCOUNTER (OUTPATIENT)
Age: 62
Discharge: HOME | End: 2022-07-06
Payer: MEDICARE

## 2022-07-06 DIAGNOSIS — C15.9 ESOPHAGEAL ADENOCARCINOMA (HCC): ICD-10-CM

## 2022-07-06 LAB
ABSOLUTE EOS #: 0.1 K/UL (ref 0–0.4)
ABSOLUTE LYMPH #: 0.5 K/UL (ref 1–4.8)
ABSOLUTE MONO #: 1.3 K/UL (ref 0.1–1.3)
ALBUMIN SERPL-MCNC: 3 G/DL (ref 3.5–5.2)
ALP BLD-CCNC: 166 U/L (ref 40–129)
ALT SERPL-CCNC: 31 U/L (ref 5–41)
ANION GAP SERPL CALCULATED.3IONS-SCNC: 10 MMOL/L (ref 9–17)
AST SERPL-CCNC: 70 U/L
BASOPHILS # BLD: 0 % (ref 0–2)
BASOPHILS ABSOLUTE: 0 K/UL (ref 0–0.2)
BILIRUB SERPL-MCNC: 1.63 MG/DL (ref 0.3–1.2)
BUN BLDV-MCNC: 14 MG/DL (ref 8–23)
CALCIUM SERPL-MCNC: 8.8 MG/DL (ref 8.6–10.4)
CHLORIDE BLD-SCNC: 94 MMOL/L (ref 98–107)
CO2: 24 MMOL/L (ref 20–31)
CREAT SERPL-MCNC: 1.17 MG/DL (ref 0.7–1.2)
EOSINOPHILS RELATIVE PERCENT: 1 % (ref 0–4)
GFR AFRICAN AMERICAN: >60 ML/MIN
GFR NON-AFRICAN AMERICAN: >60 ML/MIN
GFR SERPL CREATININE-BSD FRML MDRD: ABNORMAL ML/MIN/{1.73_M2}
GLUCOSE BLD-MCNC: 120 MG/DL (ref 70–99)
HCT VFR BLD CALC: 29.9 % (ref 41–53)
HEMOGLOBIN: 9.5 G/DL (ref 13.5–17.5)
LYMPHOCYTES # BLD: 6 % (ref 24–44)
MCH RBC QN AUTO: 29.9 PG (ref 26–34)
MCHC RBC AUTO-ENTMCNC: 31.9 G/DL (ref 31–37)
MCV RBC AUTO: 93.6 FL (ref 80–100)
MONOCYTES # BLD: 17 % (ref 1–7)
PDW BLD-RTO: 18.7 % (ref 11.5–14.9)
PLATELET # BLD: 184 K/UL (ref 150–450)
PMV BLD AUTO: 6.8 FL (ref 6–12)
POTASSIUM SERPL-SCNC: 3.7 MMOL/L (ref 3.7–5.3)
RBC # BLD: 3.19 M/UL (ref 4.5–5.9)
SEG NEUTROPHILS: 76 % (ref 36–66)
SEGMENTED NEUTROPHILS ABSOLUTE COUNT: 5.8 K/UL (ref 1.3–9.1)
SODIUM BLD-SCNC: 128 MMOL/L (ref 135–144)
TOTAL PROTEIN: 5.5 G/DL (ref 6.4–8.3)
WBC # BLD: 7.7 K/UL (ref 3.5–11)

## 2022-07-06 PROCEDURE — 85025 COMPLETE CBC W/AUTO DIFF WBC: CPT

## 2022-07-06 PROCEDURE — 80053 COMPREHEN METABOLIC PANEL: CPT

## 2022-07-06 PROCEDURE — 36415 COLL VENOUS BLD VENIPUNCTURE: CPT

## 2022-07-07 ENCOUNTER — OFFICE VISIT (OUTPATIENT)
Dept: GASTROENTEROLOGY | Age: 62
End: 2022-07-07
Payer: MEDICARE

## 2022-07-07 VITALS
BODY MASS INDEX: 26.54 KG/M2 | DIASTOLIC BLOOD PRESSURE: 65 MMHG | HEART RATE: 83 BPM | WEIGHT: 185 LBS | SYSTOLIC BLOOD PRESSURE: 93 MMHG

## 2022-07-07 DIAGNOSIS — R18.8 OTHER ASCITES: Primary | ICD-10-CM

## 2022-07-07 DIAGNOSIS — K70.31 ASCITES DUE TO ALCOHOLIC CIRRHOSIS (HCC): ICD-10-CM

## 2022-07-07 PROCEDURE — 99214 OFFICE O/P EST MOD 30 MIN: CPT | Performed by: INTERNAL MEDICINE

## 2022-07-07 ASSESSMENT — ENCOUNTER SYMPTOMS
ALLERGIC/IMMUNOLOGIC NEGATIVE: 1
RESPIRATORY NEGATIVE: 1
ABDOMINAL DISTENTION: 1

## 2022-07-07 NOTE — PROGRESS NOTES
GI OFFICE FOLLOW UP    INTERVAL HISTORY:   No referring provider defined for this encounter. Chief Complaint   Patient presents with    Cirrhosis     pt is here to f/u from paracentesis and labs        1. Other ascites    2. Ascites due to alcoholic cirrhosis (HCC)              HISTORY OF PRESENT ILLNESS:   Patient seen along with his wife. He is being followed for management of ascites, cirrhosis of the liver, esophageal cancer. Patient is known to have portal hypertension, esophageal varices, portal hypertensive gastropathy. Since his last admission few weeks ago, he is doing well. No melena. No nausea vomiting. Tolerating diet well. Still he is not following strict sodium restricted diet. At present he is on Aldactone 100 mg a day and Lasix 20 mg twice a day. Also he is on midodrine. Patient states he is doing well. No abdominal pain. No fever chills. I did review his labs that were done yesterday. Sodium is 128. Potassium 3.7. Hemoglobin 9.5. He also had abdominal paracentesis done about 7 to 10 days ago. Past Medical,Family, and Social History reviewed and does contribute to the patient presenting condition. Patient's PMH/PSH,SH,PSYCH Hx, MEDs, ALLERGIES, and ROS were all reviewed and updated in the appropriate sections.  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 Blue Barrett, orthopedic, every 3-4 months, gets steroid injection    Lezama esophagus     BPH (benign prostatic hypertrophy)     Cholelithiasis     Cirrhosis ([address]     COVID-19 12/2020    pt reports he had a positive test while at [facility] in 2020, was asymptomatic    COVID-19 vaccine series completed 5/20/2021, 6/22/2021    Moderna 5/20/2021, 6/22/2021    DDD (degenerative disc disease), lumbar     Depression     Esophageal cancer (HCC)     INVASIVE ADENOCARCINOMA ARISING IN TUBULAR ADENOMA WITH HIGH GRADE DYSPLASIA, ASSOCIATED WITH FOCAL INTESTINAL METAPLASIA     Esophageal varices (HCC)     Fatty liver     GERD (gastroesophageal reflux disease)     Hep C w/o coma, chronic (HCC)     History of alcohol abuse     6-12 beers a day; quit drinking July 2016    History of blood transfusion     History of colon polyps 2016    History of tobacco abuse     [place] (hard of hearing)     Hyperlipidemia     Hypertension     Port-A-Cath in place     right upper chest    Sciatica     Spinal stenosis     Stomach ulcer     hx of    Tubular adenoma of colon 2016, 2018    Vitamin D deficiency     Wears glasses        Past Surgical History:   Procedure Laterality Date    BUNIONECTOMY      twice on right side    BUNIONECTOMY Left     CARPAL TUNNEL RELEASE Right     COLONOSCOPY      at age 35    COLONOSCOPY  10/05/2016    polyps-pathology tubular adenoma, and abnormal looking mucosa right colon-pathology-tubular adenoma    COLONOSCOPY N/A 3/30/2018    COLONOSCOPY POLYPECTOMY COLD BIOPSY performed by Jian Uribe MD at [facility]  03/30/2018    Small polyp in the sigmoid colon and excised with biopsy forceps--tubular adenoma    COLONOSCOPY N/A 4/16/2022    COLONOSCOPY POLYPECTOMY performed by Jian Uribe MD at [place] [address], COLON, DIAGNOSTIC      EGD    IR PORT PLACEMENT EQUAL OR GREATER THAN 5 YEARS  4/19/2021    IR PORT PLACEMENT EQUAL OR GREATER THAN 5 YEARS 4/19/2021 [facility] PROCED[facility]    KNEE SURGERY Left     cyst removed    NASAL SEPTUM SURGERY      NERVE BLOCK Right 11/23/2020    NERVE BLOCK RIGHT CERVICAL STEROID INJECTION  C3-C6 performed by Shante Walton MD at [address]  01/04/16    steroid injection C7 T1    OTHER SURGICAL HISTORY  11/21/2016    Bilateral Lumbar CACHORRO L4-L5 injections  OTHER SURGICAL HISTORY  12/19/2016    lumbar steroid injection    OTHER SURGICAL HISTORY  09/28/2018    BILATERAL L5 CACHORRO (N/A Back)    OTHER SURGICAL HISTORY Right 11/23/2020    cervical injection    PAIN MANAGEMENT PROCEDURE Left 7/9/2020    EPIDURAL STEROID INJECTION LEFT L4 L5 performed by Tacos Mars MD at [place] [address] Left 7/20/2020    LEFT L4 L5 EPIDURAL STEROID INJECTION performed by Tacos Mars MD at [place] [address] Bilateral 8/17/2020    LUMBAR FACET BILATERAL L2-L5 performed by Tacos Mars MD at [place] [address] Bilateral 12/7/2020    NERVE BLOCK BILATERAL LUMBAR MEDIAL BRANCH L2-L5 performed by Tacos Mars MD at [address] AA&/STRD TFRML EPI LUMBAR/SACRAL 1 LEVEL Bilateral 9/6/2018    BILATERAL L5 CACHORRO performed by Tacos Mars MD at [address] AA&/STRD TFRML EPI LUMBAR/SACRAL 1 LEVEL N/A 9/28/2018    BILATERAL L5 CACHORRO performed by Tacos Mars MD at [address] [facility] N/CARPAL TUNNEL SURG Right 8/29/2017    CARPAL TUNNEL RELEASE RIGHT performed by Nasima Johnson MD at [address] [facility] N/CARPAL TUNNEL SURG Left 10/31/2017    CARPAL TUNNEL RELEASE performed by Nasima Johnson MD at [address] [place] [address] 12/29/2020    EGD BIOPSY performed by Nely Cormier MD at [address] [place] [address] N/A 2/2/2021    EGD BIOPSY and spot marking performed by Any Altamirano MD at [address] [place] [address] N/A 2/12/2021    ENDOSCOPIC ULTRASOUND, EGD performed by Andrew Kumari MD at [place] [address]  2/12/2021    EGD DIAGNOSTIC ONLY performed by Andrew Kumari MD at [place] [address] N/A 8/31/2021    EGD BIOPSY performed by Any Altamirano MD at [address] [place] [address] 1/21/2022    EGD BIOPSY performed by David Beal Manolo Dominguez MD at [address] [place] [address] N/A 4/15/2022    EGD ESOPHAGOGASTRODUODENOSCOPY performed by Renay Prakash MD at [address] N/A 6/6/2022    EGD BAND LIGATION performed by Shyla Coombs MD at [address] [place] [address] N/A 6/9/2022    EGD ESOPHAGOGASTRODUODENOSCOPY performed by Shyla Coombs MD at [facility]:    Current Outpatient Medications:     furosemide (LASIX) 20 MG tablet, Take 1 tablet by mouth 2 times daily, Disp: 60 tablet, Rfl: 3    spironolactone (ALDACTONE) 100 MG tablet, Take 1 tablet by mouth every evening, Disp: 30 tablet, Rfl: 1    ondansetron (ZOFRAN-ODT) 4 MG disintegrating tablet, dissolve 1 tablet by mouth every 8 hours if needed for nausea and vomiting, Disp: 10 tablet, Rfl: 0    lactulose (CHRONULAC) 10 GM/15ML solution, take 30 milliliters by mouth three times a day, Disp: 473 mL, Rfl: 1    nadolol (CORGARD) 20 MG tablet, take 1 tablet by mouth once daily, Disp: , Rfl:     FLUoxetine (PROZAC) 20 MG capsule, Take 1 capsule by mouth daily, Disp: 30 capsule, Rfl: 3    atorvastatin (LIPITOR) 20 MG tablet, Take 1 tablet by mouth nightly, Disp: 30 tablet, Rfl: 3    midodrine (PROAMATINE) 5 MG tablet, Take 1 tablet by mouth 3 times daily as needed (SBP <110), Disp: 90 tablet, Rfl: 3    ferrous sulfate (IRON 325) 325 (65 Fe) MG tablet, Take 1 tablet by mouth 2 times daily, Disp: 60 tablet, Rfl: 5    pantoprazole (PROTONIX) 40 MG tablet, Take 1 tablet by mouth 2 times daily for 14 days, Disp: 28 tablet, Rfl: 0    sucralfate (CARAFATE) 1 GM tablet, Take 1 tablet by mouth 4 times daily for 14 days, Disp: 56 tablet, Rfl: 0    vitamin D (CHOLECALCIFEROL) 25 MCG (1000 UT) TABS tablet, Take 1,000 Units by mouth daily (Patient not taking: Reported on 6/28/2022), Disp: , Rfl:     ALLERGIES:   No Known Allergies    FAMILY HISTORY:       Problem Relation Age of Onset    Cancer Mother         pancreatic    Cancer Father         bone    Diabetes Sister     Asthma Brother          SOCIAL HISTORY:   Social History     Socioeconomic History    Marital status: Single     Spouse name: Not on file    Number of children: Not on file    Years of education: Not on file    Highest education level: Not on file   Occupational History    Not on file   Tobacco Use    Smoking status: Former Smoker     Packs/day: 1.00     Years: 45.00     Pack years: 45.00     Quit date: 2017     Years since quittin.4    Smokeless tobacco: Never Used   Vaping Use    Vaping Use: Never used   Substance and Sexual Activity    Alcohol use: Not Currently     Comment: quit     Drug use: Not Currently     Frequency: 1.0 times per week     Comment: cocaine,  stopped spring 2016    Sexual activity: Yes     Partners: Female   Other Topics Concern    Not on file   Social History Narrative     in the past, retired     Social Determinants of Health     Financial Resource Strain: [address] Difficulty of Paying Living Expenses: Not hard at all   Food Insecurity: No Food Insecurity    Worried About [address] in the Last Year: Never true   [address] in the Last Year: Never true   Transportation Needs:     Lack of Transportation (Medical): Not on file    Lack of Transportation (Non-Medical):  Not on file   Physical Activity:     Days of Exercise per Week: Not on file    Minutes of Exercise per Session: Not on file   Stress:     Feeling of Stress : Not on file   Social Connections:     Frequency of Communication with Friends and Family: Not on file    Frequency of Social Gatherings with Friends and Family: Not on file    Attends Bahai Services: Not on file    Active Member of Clubs or Organizations: Not on file    Attends Club or Organization Meetings: Not on file    Marital Status: Not on file   Intimate Partner Violence:     Fear of Current or Ex-Partner: Not on file    Emotionally Abused: Not on file    Physically Abused: Not on file    Sexually Abused: Not on file   Housing Stability:     Unable to Pay for Housing in the Last Year: Not on file    Number of Places Lived in the Last Year: Not on file    Unstable Housing in the Last Year: Not on file         REVIEW OF SYSTEMS:         Review of Systems   Constitutional: Negative. HENT: Negative. Eyes: Positive for visual disturbance (glasses ). Respiratory: Negative. Cardiovascular: Negative. Gastrointestinal: Positive for abdominal distention. Endocrine: Negative. Genitourinary: Negative. Musculoskeletal: Negative. Skin: Negative. Allergic/Immunologic: Negative. Neurological: Negative. Hematological: Bruises/bleeds easily. Psychiatric/Behavioral: Negative. PHYSICAL EXAMINATION:     Vital signs reviewed per the nursing documentation. BP 93/65   Pulse 83   Wt 185 lb (83.9 kg)   BMI 26.54 kg/m²   Body mass index is 26.54 kg/m². Physical Exam  Vitals reviewed. Constitutional:       Appearance: Normal appearance. HENT:      Head: Normocephalic and atraumatic. Eyes:      Extraocular Movements: Extraocular movements intact. Conjunctiva/sclera: Conjunctivae normal.   Cardiovascular:      Rate and Rhythm: Normal rate and regular rhythm. Heart sounds: Normal heart sounds. Pulmonary:      Effort: Pulmonary effort is normal.      Breath sounds: Normal breath sounds. Abdominal:      General: Bowel sounds are normal. There is no distension. Palpations: Abdomen is soft. There is no mass. Tenderness: There is no abdominal tenderness. There is no guarding. Hernia: No hernia is present. Comments: Has clinical ascites. However abdomen is not significantly distended. Looks much better than before. Musculoskeletal:      Right lower leg: No edema. Left lower leg: No edema. Skin:     General: Skin is warm and dry. is present. Calcified splenic artery aneurysm again demonstrated. 1.  No listhesis or evidence for dynamic instability. 2.  Advanced multilevel degenerative disc disease and lower lumbar facet arthropathy. 3.  Chronic compression deformity of T12. No new osseous abnormality appreciated. IR US GUIDED PARACENTESIS    Result Date: 6/28/2022  PROCEDURE: PARACENTESIS WITH IMAGE GUIDANCE US ABDOMEN LIMITED 6/28/2022 HISTORY: ORDERING SYSTEM PROVIDED HISTORY: Other ascites TECHNOLOGIST PROVIDED HISTORY: ascites TECHNIQUE: Informed consent was obtained after a detailed explanation of the procedure including risks, benefits, and alternatives. Universal protocol was followed. A limited ultrasound of the abdomen was performed and shows large amount of ascites. The right abdomen was prepped and draped in sterile fashion and local anesthesia was achieved with lidocaine. A 5 Croatian needle sheath was advanced into ascites using realtime ultrasound guidance and paracentesis was performed. The patient tolerated the procedure well. EBL: None FINDINGS: Limited ultrasound of the abdomen demonstrates ascites. A total of 10.8 L of clear yellow fluid was removed. Supplemental albumin was given intravenously. Successful ultrasound-guided paracentesis. IR US GUIDED PARACENTESIS    Result Date: 6/8/2022  PROCEDURE: PARACENTESIS WITHOUT IMAGE GUIDANCE US ABDOMEN LIMITED 6/8/2022 HISTORY: ORDERING SYSTEM PROVIDED HISTORY: ascites TECHNOLOGIST PROVIDED HISTORY: ascites TECHNIQUE: Informed consent was obtained after a detailed explanation of the procedure including risks and benefits. Universal protocol was followed. A limited ultrasound of the abdomen was performed. The right abdomen was prepped and draped in sterile fashion, and local anesthesia was achieved with 1% lidocaine. A 5 Croatian needle sheath was advanced into ascites, and paracentesis was performed.   A total of 11.8 L of translucent yellow fluid was aspirated. The patient tolerated the procedure well. FINDINGS: Limited abdominal ultrasound yielding a large amount of ascites. Successful paracentesis. Intravenous albumin administration is recommended. Assessment  1. Other ascites    2. Ascites due to alcoholic cirrhosis [facility])        Plan  Discussed with the patient and wife regarding the labs. Hepatitis C virus could not be treated because of patient unstable status secondary to significant ascites, recurrent GI bleeding, carcinoma of the esophagus etc.  However this needs to be considered once patient status stabilizes  Strongly encouraged salt restricted diet. Given that his sodium is 128 I will decrease his Aldactone to 100 mg 1 day and alternating with 50 mg next day. We will repeat labs in the next 7 to 10 days. In between if he has any issues to contact us. Discussed the importance of close follow-up given his fragile state, need close monitoring of electrolytes and renal function. They understood and agreed. Thank you for allowing me to participate in the care of Mr. Danette Ariza. For any further questions please do not hesitate to contact me. I have reviewed and agree with the ROS entered by the MA/LPN. Note is dictated utilizing voice recognition software. Unfortunately this leads to occasional typographical errors.  Please contact our office if you have any questions        Vincenzo Flores MD,FACP, [facility]  Board Certified in Gastroenterology and [address] [place] [address] Gastroenterology  Office #: [phone]

## 2022-07-11 ENCOUNTER — OFFICE VISIT (OUTPATIENT)
Dept: PRIMARY CARE CLINIC | Age: 62
End: 2022-07-11
Payer: MEDICARE

## 2022-07-11 VITALS
SYSTOLIC BLOOD PRESSURE: 114 MMHG | HEART RATE: 68 BPM | BODY MASS INDEX: 28.09 KG/M2 | DIASTOLIC BLOOD PRESSURE: 68 MMHG | WEIGHT: 196.2 LBS | OXYGEN SATURATION: 98 % | HEIGHT: 70 IN

## 2022-07-11 DIAGNOSIS — M48.062 SPINAL STENOSIS OF LUMBAR REGION WITH NEUROGENIC CLAUDICATION: ICD-10-CM

## 2022-07-11 DIAGNOSIS — Z00.00 MEDICARE ANNUAL WELLNESS VISIT, SUBSEQUENT: Primary | ICD-10-CM

## 2022-07-11 DIAGNOSIS — Z91.81 AT HIGH RISK FOR FALLS: ICD-10-CM

## 2022-07-11 DIAGNOSIS — K70.31 ALCOHOLIC CIRRHOSIS OF LIVER WITH ASCITES (HCC): ICD-10-CM

## 2022-07-11 PROBLEM — N17.9 ACUTE KIDNEY FAILURE, UNSPECIFIED (HCC): Status: ACTIVE | Noted: 2022-04-21

## 2022-07-11 PROCEDURE — G0439 PPPS, SUBSEQ VISIT: HCPCS | Performed by: PHYSICIAN ASSISTANT

## 2022-07-11 ASSESSMENT — PATIENT HEALTH QUESTIONNAIRE - PHQ9
4. FEELING TIRED OR HAVING LITTLE ENERGY: 0
SUM OF ALL RESPONSES TO PHQ QUESTIONS 1-9: 0
5. POOR APPETITE OR OVEREATING: 0
SUM OF ALL RESPONSES TO PHQ9 QUESTIONS 1 & 2: 0
2. FEELING DOWN, DEPRESSED OR HOPELESS: 0
9. THOUGHTS THAT YOU WOULD BE BETTER OFF DEAD, OR OF HURTING YOURSELF: 0
1. LITTLE INTEREST OR PLEASURE IN DOING THINGS: 0
SUM OF ALL RESPONSES TO PHQ QUESTIONS 1-9: 0
3. TROUBLE FALLING OR STAYING ASLEEP: 0
7. TROUBLE CONCENTRATING ON THINGS, SUCH AS READING THE NEWSPAPER OR WATCHING TELEVISION: 0
6. FEELING BAD ABOUT YOURSELF - OR THAT YOU ARE A FAILURE OR HAVE LET YOURSELF OR YOUR FAMILY DOWN: 0
SUM OF ALL RESPONSES TO PHQ QUESTIONS 1-9: 0
SUM OF ALL RESPONSES TO PHQ QUESTIONS 1-9: 0
8. MOVING OR SPEAKING SO SLOWLY THAT OTHER PEOPLE COULD HAVE NOTICED. OR THE OPPOSITE, BEING SO FIGETY OR RESTLESS THAT YOU HAVE BEEN MOVING AROUND A LOT MORE THAN USUAL: 0
10. IF YOU CHECKED OFF ANY PROBLEMS, HOW DIFFICULT HAVE THESE PROBLEMS MADE IT FOR YOU TO DO YOUR WORK, TAKE CARE OF THINGS AT HOME, OR GET ALONG WITH OTHER PEOPLE: 0

## 2022-07-11 NOTE — PROGRESS NOTES
Medicare Annual Wellness Visit    Jojo Burr is here for Medicare AWV    Assessment & Plan   Medicare annual wellness visit, subsequent  Alcoholic cirrhosis of liver with ascites ([facility]  -     [facility] [place]  Spinal stenosis of lumbar region with neurogenic claudication  -     [address]  At high risk for falls  -     [address]      Recommendations for [facility] [place] Due: see orders and patient instructions/AVS.  Recommended screening schedule for the next 5-10 years is provided to the patient in written form: see Patient Instructions/AVS.     Return for [place] Visit in 1 year. Subjective   The following acute and/or chronic problems were also addressed today:  His cirrhosis is causing him to need paracentesis every 3 weeks or so. He is following GI. Never needed to see nephrologist. Back pain will no longer be fixed by an injection. Patient's complete Health Risk Assessment and screening values have been reviewed and are found in Flowsheets. The following problems were reviewed today and where indicated follow up appointments were made and/or referrals ordered.     Positive Risk Factor Screenings with Interventions:    Fall Risk:  Do you feel unsteady or are you worried about falling? : no  2 or more falls in past year?: (!) yes  Fall with injury in past year?: no     Fall Risk Interventions:    · Home safety tips provided              General Health and ACP:  General  In general, how would you say your health is?: (!) Poor  In the past 7 days, have you experienced any of the following: New or Increased Pain, New or Increased Fatigue, Loneliness, Social Isolation, Stress or Anger?: No  Do you get the social and emotional support that you need?: Yes  Do you have a Living Will?: Yes    Advance Directives     Power of  Living Will ACP-Advance Directive ACP-Power of     Not on File Not on File Not on File Not on File      General Health Risk Interventions:  · Poor self-assessment of health status: patient declines any further evaluation/treatment for this issue    Health Habits/Nutrition:     Physical Activity: Inactive    Days of Exercise per Week: 0 days    Minutes of Exercise per Session: 0 min     Have you lost any weight without trying in the past 3 months?: No  Body mass index: (!) 28.15  Have you seen the dentist within the past year?: (!) No    Health Habits/Nutrition Interventions:  · Weight changes with acities/     Hearing/Vision:  Do you or your family notice any trouble with your hearing that hasn't been managed with hearing aids?: (!) Yes  Do you have difficulty driving, watching TV, or doing any of your daily activities because of your eyesight?: No  Have you had an eye exam within the past year?: (!) No  No exam data present    Hearing/Vision Interventions:  · Vision concerns:  patient encouraged to make appointment with his/her eye specialist            Objective   Vitals:    07/11/22 1100   BP: 114/68   Pulse: 68   SpO2: 98%   Weight: 196 lb 3.2 oz (89 kg)   Height: 5' 10\" (1.778 m)      Body mass index is 28.15 kg/m².         General Appearance: alert and oriented to person, place and time, well developed and well- nourished, in no acute distress  Skin: warm and dry, no rash or erythema  Head: normocephalic and atraumatic  Eyes: pupils equal, round, and reactive to light, extraocular eye movements intact, conjunctivae normal  ENT: tympanic membrane, external ear and ear canal normal bilaterally, nose without deformity, nasal mucosa and turbinates normal without polyps  Neck: supple and non-tender without mass, no thyromegaly or thyroid nodules, no cervical lymphadenopathy  Pulmonary/Chest: clear to auscultation bilaterally- no wheezes, rales or rhonchi, normal air movement, no respiratory distress  Cardiovascular: normal rate, regular rhythm, normal S1 and S2, no murmurs, rubs, clicks, or gallops, distal pulses intact, no carotid bruits  Extremities: no cyanosis, clubbing or edema  Musculoskeletal: normal range of motion, no joint swelling, deformity or tenderness  Neurologic: reflexes normal and symmetric, no cranial nerve deficit, gait, coordination and speech normal  Ascites and gestalt of liver failure. No Known Allergies  Prior to Visit Medications    Medication Sig Taking?  Authorizing Provider   furosemide (LASIX) 20 MG tablet Take 1 tablet by mouth 2 times daily Yes MASSIMO Chavez NP   spironolactone (ALDACTONE) 100 MG tablet Take 1 tablet by mouth every evening  Patient taking differently: Take 100 mg by mouth every evening Take 1 tablet at 100mg every other day, Other days take 1/2 tablet Yes MASSIMO Chavez NP   vitamin D (CHOLECALCIFEROL) 25 MCG (1000 UT) TABS tablet Take 1,000 Units by mouth daily  Yes Historical Provider, MD   ondansetron (ZOFRAN-ODT) 4 MG disintegrating tablet dissolve 1 tablet by mouth every 8 hours if needed for nausea and vomiting Yes VASU Monroe   lactulose (CHRONULAC) 10 GM/15ML solution take 30 milliliters by mouth three times a day Yes VASU Monroe   nadolol (CORGARD) 20 MG tablet take 1 tablet by mouth once daily Yes Historical Provider, MD   FLUoxetine (PROZAC) 20 MG capsule Take 1 capsule by mouth daily Yes Jay Frias MD   atorvastatin (LIPITOR) 20 MG tablet Take 1 tablet by mouth nightly Yes Jay Frias MD   midodrine (PROAMATINE) 5 MG tablet Take 1 tablet by mouth 3 times daily as needed (SBP <110) Yes Jay Frias MD   ferrous sulfate (IRON 325) 325 (65 Fe) MG tablet Take 1 tablet by mouth 2 times daily Yes VASU Monroe   pantoprazole (PROTONIX) 40 MG tablet Take 1 tablet by mouth 2 times daily for 14 days  Soledad Sands MD   sucralfate (CARAFATE) 1 GM tablet Take 1 tablet by mouth 4 times daily for 14 days  MD Olu Sarmiento (Including outside providers/suppliers regularly involved

## 2022-07-11 NOTE — PATIENT INSTRUCTIONS
Personalized Preventive Plan for Stevie Mar - 7/11/2022  Medicare offers a range of preventive health benefits. Some of the tests and screenings are paid in full while other may be subject to a deductible, co-insurance, and/or copay. Some of these benefits include a comprehensive review of your medical history including lifestyle, illnesses that may run in your family, and various assessments and screenings as appropriate. After reviewing your medical record and screening and assessments performed today your provider may have ordered immunizations, labs, imaging, and/or referrals for you. A list of these orders (if applicable) as well as your Preventive Care list are included within your After Visit Summary for your review. Other Preventive Recommendations:    · A preventive eye exam performed by an eye specialist is recommended every 1-2 years to screen for glaucoma; cataracts, macular degeneration, and other eye disorders. · A preventive dental visit is recommended every 6 months. · Try to get at least 150 minutes of exercise per week or 10,000 steps per day on a pedometer . · Order or download the FREE \"Exercise & Physical Activity: Your Everyday Guide\" from [employer]. Call 1-974.557.1255 or search [employer] online. · You need 3925-4866 mg of calcium and 6482-9213 IU of vitamin D per day. It is possible to meet your calcium requirement with diet alone, but a vitamin D supplement is usually necessary to meet this goal.  · When exposed to the sun, use a sunscreen that protects against both UVA and UVB radiation with an SPF of 30 or greater. Reapply every 2 to 3 hours or after sweating, drying off with a towel, or swimming. · Always wear a seat belt when traveling in a car. Always wear a helmet when riding a bicycle or motorcycle.

## 2022-07-18 ENCOUNTER — HOSPITAL ENCOUNTER (OUTPATIENT)
Age: 62
Discharge: HOME | End: 2022-07-18
Payer: MEDICARE

## 2022-07-18 ENCOUNTER — HOSPITAL ENCOUNTER (OUTPATIENT)
Dept: INFUSION THERAPY | Age: 62
Discharge: HOME | End: 2022-07-18
Payer: MEDICARE

## 2022-07-18 DIAGNOSIS — R18.8 OTHER ASCITES: ICD-10-CM

## 2022-07-18 DIAGNOSIS — C15.9 ESOPHAGEAL ADENOCARCINOMA (HCC): Primary | ICD-10-CM

## 2022-07-18 LAB
ALBUMIN SERPL-MCNC: 2.8 G/DL (ref 3.5–5.2)
ALP BLD-CCNC: 175 U/L (ref 40–129)
ALT SERPL-CCNC: 17 U/L (ref 5–41)
ANION GAP SERPL CALCULATED.3IONS-SCNC: 11 MMOL/L (ref 9–17)
AST SERPL-CCNC: 43 U/L
BILIRUB SERPL-MCNC: 1.01 MG/DL (ref 0.3–1.2)
BUN BLDV-MCNC: 10 MG/DL (ref 8–23)
CALCIUM SERPL-MCNC: 8.7 MG/DL (ref 8.6–10.4)
CHLORIDE BLD-SCNC: 97 MMOL/L (ref 98–107)
CO2: 21 MMOL/L (ref 20–31)
CREAT SERPL-MCNC: 0.63 MG/DL (ref 0.7–1.2)
GFR AFRICAN AMERICAN: >60 ML/MIN
GFR NON-AFRICAN AMERICAN: >60 ML/MIN
GFR SERPL CREATININE-BSD FRML MDRD: ABNORMAL ML/MIN/{1.73_M2}
GLUCOSE BLD-MCNC: 91 MG/DL (ref 70–99)
HCT VFR BLD CALC: 31.2 % (ref 41–53)
HEMOGLOBIN: 9.8 G/DL (ref 13.5–17.5)
MCH RBC QN AUTO: 28.4 PG (ref 26–34)
MCHC RBC AUTO-ENTMCNC: 31.3 G/DL (ref 31–37)
MCV RBC AUTO: 90.8 FL (ref 80–100)
PDW BLD-RTO: 17 % (ref 11.5–14.9)
PLATELET # BLD: 300 K/UL (ref 150–450)
PMV BLD AUTO: 6.1 FL (ref 6–12)
POTASSIUM SERPL-SCNC: 4.4 MMOL/L (ref 3.7–5.3)
RBC # BLD: 3.44 M/UL (ref 4.5–5.9)
SODIUM BLD-SCNC: 129 MMOL/L (ref 135–144)
TOTAL PROTEIN: 5.9 G/DL (ref 6.4–8.3)
WBC # BLD: 6 K/UL (ref 3.5–11)

## 2022-07-18 PROCEDURE — 6360000002 HC RX W HCPCS: Performed by: INTERNAL MEDICINE

## 2022-07-18 PROCEDURE — 36593 DECLOT VASCULAR DEVICE: CPT

## 2022-07-18 PROCEDURE — 85027 COMPLETE CBC AUTOMATED: CPT

## 2022-07-18 PROCEDURE — 2580000003 HC RX 258: Performed by: INTERNAL MEDICINE

## 2022-07-18 PROCEDURE — 96523 IRRIG DRUG DELIVERY DEVICE: CPT

## 2022-07-18 PROCEDURE — 80053 COMPREHEN METABOLIC PANEL: CPT

## 2022-07-18 PROCEDURE — 36415 COLL VENOUS BLD VENIPUNCTURE: CPT

## 2022-07-18 PROCEDURE — 96375 TX/PRO/DX INJ NEW DRUG ADDON: CPT

## 2022-07-18 RX ORDER — HEPARIN SODIUM (PORCINE) LOCK FLUSH IV SOLN 100 UNIT/ML 100 UNIT/ML
500 SOLUTION INTRAVENOUS PRN
Status: DISCONTINUED | OUTPATIENT
Start: 2022-07-18 | End: 2022-07-19 | Stop reason: HOSPADM

## 2022-07-18 RX ORDER — SODIUM CHLORIDE 0.9 % (FLUSH) 0.9 %
5-40 SYRINGE (ML) INJECTION PRN
Status: DISCONTINUED | OUTPATIENT
Start: 2022-07-18 | End: 2022-07-19 | Stop reason: HOSPADM

## 2022-07-18 RX ORDER — SODIUM CHLORIDE 0.9 % (FLUSH) 0.9 %
5-40 SYRINGE (ML) INJECTION PRN
Status: CANCELLED | OUTPATIENT
Start: 2022-07-18

## 2022-07-18 RX ORDER — HEPARIN SODIUM (PORCINE) LOCK FLUSH IV SOLN 100 UNIT/ML 100 UNIT/ML
500 SOLUTION INTRAVENOUS PRN
Status: CANCELLED | OUTPATIENT
Start: 2022-07-18

## 2022-07-18 RX ORDER — SODIUM CHLORIDE 9 MG/ML
25 INJECTION, SOLUTION INTRAVENOUS PRN
OUTPATIENT
Start: 2022-07-18

## 2022-07-18 RX ADMIN — ALTEPLASE 2 MG: 2.2 INJECTION, POWDER, LYOPHILIZED, FOR SOLUTION INTRAVENOUS at 14:43

## 2022-07-18 RX ADMIN — SODIUM CHLORIDE, PRESERVATIVE FREE 10 ML: 5 INJECTION INTRAVENOUS at 15:04

## 2022-07-18 RX ADMIN — SODIUM CHLORIDE, PRESERVATIVE FREE 10 ML: 5 INJECTION INTRAVENOUS at 14:35

## 2022-07-18 RX ADMIN — HEPARIN 500 UNITS: 100 SYRINGE at 15:05

## 2022-07-18 RX ADMIN — SODIUM CHLORIDE, PRESERVATIVE FREE 10 ML: 5 INJECTION INTRAVENOUS at 14:37

## 2022-07-18 RX ADMIN — WATER 10 ML: 1 INJECTION INTRAMUSCULAR; INTRAVENOUS; SUBCUTANEOUS at 14:43

## 2022-07-18 RX ADMIN — SODIUM CHLORIDE, PRESERVATIVE FREE 10 ML: 5 INJECTION INTRAVENOUS at 14:36

## 2022-07-19 ENCOUNTER — OFFICE VISIT (OUTPATIENT)
Dept: GASTROENTEROLOGY | Age: 62
End: 2022-07-19
Payer: MEDICARE

## 2022-07-19 VITALS
SYSTOLIC BLOOD PRESSURE: 101 MMHG | WEIGHT: 206 LBS | HEART RATE: 86 BPM | DIASTOLIC BLOOD PRESSURE: 72 MMHG | BODY MASS INDEX: 29.56 KG/M2

## 2022-07-19 DIAGNOSIS — K70.31 ASCITES DUE TO ALCOHOLIC CIRRHOSIS (HCC): Primary | ICD-10-CM

## 2022-07-19 PROCEDURE — 99214 OFFICE O/P EST MOD 30 MIN: CPT | Performed by: INTERNAL MEDICINE

## 2022-07-19 ASSESSMENT — ENCOUNTER SYMPTOMS
RESPIRATORY NEGATIVE: 1
ALLERGIC/IMMUNOLOGIC NEGATIVE: 1
ABDOMINAL DISTENTION: 1

## 2022-07-19 NOTE — PROGRESS NOTES
GI OFFICE FOLLOW UP    INTERVAL HISTORY:   No referring provider defined for this encounter. Chief Complaint   Patient presents with    Cirrhosis     Pt is here for lab f/u        No diagnosis found. HISTORY OF PRESENT ILLNESS:   Patient seen along with his wife. Patient is here for follow-up of labs and management of ascites. His recent serum sodium is 128 that was done yesterday. BUN/creatinine satisfactory. He has been on Lasix 20 mg twice a day. Aldactone 100 mg alternating with 50 mg. Hemoglobin is stable. He stated that his abdomen is getting distended. He gets short of breath. Also because of the distention he is having discomfort. He states that he want to have abdominal paracentesis. He stated that he is following salt restricted diet. No melena or hematochezia. No nausea vomiting. No dysphagia. Basing on my discussion it appears that he is complaining with salt restricted diet and also taking medications. Past Medical,Family, and Social History reviewed and does contribute to the patient presenting condition. Patient's PMH/PSH,SH,PSYCH Hx, MEDs, ALLERGIES, and ROS were all reviewed and updated in the appropriate sections.  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 Slava Jimenez, orthopedic, every 3-4 months, gets steroid injection    Lezama esophagus     BPH (benign prostatic hypertrophy)     Cholelithiasis     Cirrhosis ([address]     COVID-19 12/2020    pt reports he had a positive test while at [facility] in 2020, was asymptomatic    COVID-19 vaccine series completed 5/20/2021, 6/22/2021    Moderna 5/20/2021, 6/22/2021    DDD (degenerative disc disease), lumbar     Depression     Esophageal cancer ([address]     INVASIVE ADENOCARCINOMA ARISING IN TUBULAR ADENOMA WITH HIGH GRADE DYSPLASIA, ASSOCIATED WITH FOCAL INTESTINAL METAPLASIA     Esophageal varices (HCC)     Fatty liver     GERD (gastroesophageal reflux disease)     Hep C w/o coma, chronic ([address]     History of alcohol abuse     6-12 beers a day; quit drinking July 2016    History of blood transfusion     History of colon polyps 2016    History of tobacco abuse     [place]     Hyperlipidemia     Hypertension     Port-A-Cath in place     right upper chest    Sciatica     Spinal stenosis     Stomach ulcer     hx of    Tubular adenoma of colon 2016, 2018    Vitamin D deficiency     Wears glasses        Past Surgical History:   Procedure Laterality Date    BUNIONECTOMY      twice on right side    BUNIONECTOMY Left     CARPAL TUNNEL RELEASE Right     COLONOSCOPY      at age 35    COLONOSCOPY  10/05/2016    polyps-pathology tubular adenoma, and abnormal looking mucosa right colon-pathology-tubular adenoma    COLONOSCOPY N/A 3/30/2018    COLONOSCOPY POLYPECTOMY COLD BIOPSY performed by Raya Fischer MD at [place] [address]  03/30/2018    Small polyp in the sigmoid colon and excised with biopsy forceps--tubular adenoma    COLONOSCOPY N/A 4/16/2022    COLONOSCOPY POLYPECTOMY performed by Raya Fischer MD at [facility], DIAGNOSTIC      EGD    IR PORT PLACEMENT EQUAL OR GREATER THAN 5 YEARS  4/19/2021    IR PORT PLACEMENT EQUAL OR GREATER THAN 5 YEARS 4/19/2021 [facility] SPECIAL PROCEDURES    KNEE SURGERY Left     cyst removed    NASAL SEPTUM SURGERY      NERVE BLOCK Right 11/23/2020    NERVE BLOCK RIGHT CERVICAL STEROID INJECTION  C3-C6 performed by Paola Tom MD at [address]  01/04/16    steroid injection C7 T1    OTHER SURGICAL HISTORY  11/21/2016    Bilateral Lumbar CACHORRO L4-L5 injections    OTHER SURGICAL HISTORY  12/19/2016    lumbar steroid injection    OTHER SURGICAL HISTORY  09/28/2018    BILATERAL L5 CACHORRO (N/A Back)    OTHER SURGICAL HISTORY Right 11/23/2020    cervical injection    PAIN MANAGEMENT PROCEDURE Left 7/9/2020    EPIDURAL STEROID INJECTION LEFT L4 L5 performed by Maame Berg MD at [address] Left 7/20/2020    LEFT L4 L5 EPIDURAL STEROID INJECTION performed by Maame Berg MD at [address] Bilateral 8/17/2020    LUMBAR FACET BILATERAL L2-L5 performed by Maame Berg MD at [address] Bilateral 12/7/2020    NERVE BLOCK BILATERAL LUMBAR MEDIAL BRANCH L2-L5 performed by Maame Berg MD at [facility] AA&/STRD TFRML EPI LUMBAR/SACRAL 1 LEVEL Bilateral 9/6/2018    BILATERAL L5 CACHORRO performed by Maame Berg MD at [facility] AA&/STRD TFRML EPI LUMBAR/SACRAL 1 LEVEL N/A 9/28/2018    BILATERAL L5 CACHORRO performed by Maame Berg MD at [address] [place] [address] N/CARPAL TUNNEL SURG Right 8/29/2017    CARPAL TUNNEL RELEASE RIGHT performed by Lazarus Lax, MD at [address] [place] [address] N/CARPAL TUNNEL SURG Left 10/31/2017    CARPAL TUNNEL RELEASE performed by Lazarus Lax, MD at [facility] 12/29/2020    EGD BIOPSY performed by Sabina Leija MD at [address] [place] [address] N/A 2/2/2021    EGD BIOPSY and spot marking performed by Vu Lyons MD at [address] [place] [address] N/A 2/12/2021    ENDOSCOPIC ULTRASOUND, EGD performed by Lazaro Alcantara MD at [facility] 1  2/12/2021    EGD DIAGNOSTIC ONLY performed by Lazaro Alcantara MD at [place] [address] 8/31/2021    EGD BIOPSY performed by Vu Lyons MD at [facility] 1/21/2022    EGD BIOPSY performed by Vu Lyons MD at [address] [place] [address] N/A 4/15/2022    EGD ESOPHAGOGASTRODUODENOSCOPY performed by Sabina Leija MD at [address] [place] [address] ENDOSCOPY N/A 6/6/2022    EGD BAND LIGATION performed by Bryan Frank MD at [address] [facility] N/A 6/9/2022    EGD ESOPHAGOGASTRODUODENOSCOPY performed by Bryan Frank MD at [address]:    Current Outpatient Medications:     furosemide (LASIX) 20 MG tablet, Take 1 tablet by mouth 2 times daily, Disp: 60 tablet, Rfl: 3    spironolactone (ALDACTONE) 100 MG tablet, Take 1 tablet by mouth every evening (Patient taking differently: Take 100 mg by mouth every evening Take 1 tablet at 100mg every other day, Other days take 1/2 tablet), Disp: 30 tablet, Rfl: 1    vitamin D (CHOLECALCIFEROL) 25 MCG (1000 UT) TABS tablet, Take 1,000 Units by mouth daily , Disp: , Rfl:     ondansetron (ZOFRAN-ODT) 4 MG disintegrating tablet, dissolve 1 tablet by mouth every 8 hours if needed for nausea and vomiting, Disp: 10 tablet, Rfl: 0    lactulose (CHRONULAC) 10 GM/15ML solution, take 30 milliliters by mouth three times a day, Disp: 473 mL, Rfl: 1    nadolol (CORGARD) 20 MG tablet, take 1 tablet by mouth once daily, Disp: , Rfl:     FLUoxetine (PROZAC) 20 MG capsule, Take 1 capsule by mouth daily, Disp: 30 capsule, Rfl: 3    atorvastatin (LIPITOR) 20 MG tablet, Take 1 tablet by mouth nightly, Disp: 30 tablet, Rfl: 3    midodrine (PROAMATINE) 5 MG tablet, Take 1 tablet by mouth 3 times daily as needed (SBP <110), Disp: 90 tablet, Rfl: 3    ferrous sulfate (IRON 325) 325 (65 Fe) MG tablet, Take 1 tablet by mouth 2 times daily, Disp: 60 tablet, Rfl: 5    pantoprazole (PROTONIX) 40 MG tablet, Take 1 tablet by mouth 2 times daily for 14 days, Disp: 28 tablet, Rfl: 0    sucralfate (CARAFATE) 1 GM tablet, Take 1 tablet by mouth 4 times daily for 14 days, Disp: 56 tablet, Rfl: 0    ALLERGIES:   No Known Allergies    FAMILY HISTORY:       Problem Relation Age of Onset    Cancer Mother         pancreatic    Cancer Father         bone    Diabetes Sister     Asthma Brother          SOCIAL HISTORY:   Social History     Socioeconomic History    Marital status: Single     Spouse name: Not on file    Number of children: Not on file    Years of education: Not on file    Highest education level: Not on file   Occupational History    Not on file   Tobacco Use    Smoking status: Former     Packs/day: 1.00     Years: 45.00     Pack years: 45.00     Types: Cigarettes     Quit date: 2017     Years since quittin.5    Smokeless tobacco: Never   Vaping Use    Vaping Use: Never used   Substance and Sexual Activity    Alcohol use: Not Currently     Comment: quit     Drug use: Not Currently     Frequency: 1.0 times per week     Comment: cocaine,  stopped spring 2016    Sexual activity: Yes     Partners: Female   Other Topics Concern    Not on file   Social History Narrative     in the past, retired     Social Determinants of Health     Financial Resource Strain: Low Risk     Difficulty of Paying Living Expenses: Not hard at all   Food Insecurity: No Food Insecurity    Worried About [address] in the Last Year: Never true    [address] [employer] in the Last Year: Never true   Transportation Needs: Not on file   Physical Activity: Inactive    Days of Exercise per Week: 0 days    Minutes of Exercise per Session: 0 min   Stress: Not on file   Social Connections: Not on file   Intimate Partner Violence: Not on file   Housing Stability: Not on file         REVIEW OF SYSTEMS:         Review of Systems   Constitutional: Negative. HENT: Negative. Eyes:  Positive for visual disturbance (glasses ). Respiratory: Negative. Cardiovascular: Negative. Gastrointestinal:  Positive for abdominal distention. Endocrine: Negative. Genitourinary: Negative. Musculoskeletal: Negative. Skin: Negative. Allergic/Immunologic: Negative. Neurological: Negative. Hematological:  Bruises/bleeds easily. Psychiatric/Behavioral: Negative.        PHYSICAL EXAMINATION:     Vital signs reviewed per the nursing documentation. There were no vitals taken for this visit. There is no height or weight on file to calculate BMI. Physical Exam  Vitals reviewed. Constitutional:       Appearance: Normal appearance. Comments: May have signs of malnutrition. HENT:      Head: Normocephalic and atraumatic. Eyes:      Extraocular Movements: Extraocular movements intact. Conjunctiva/sclera: Conjunctivae normal.   Cardiovascular:      Rate and Rhythm: Normal rate and regular rhythm. Heart sounds: Normal heart sounds. Pulmonary:      Effort: Pulmonary effort is normal.      Breath sounds: Normal breath sounds. Abdominal:      General: Bowel sounds are normal. There is distension. Palpations: Abdomen is soft. There is shifting dullness and fluid wave. There is no mass. Tenderness: There is no abdominal tenderness. There is no guarding. Hernia: No hernia is present. Skin:     General: Skin is warm and dry. Neurological:      General: No focal deficit present. Mental Status: He is alert and oriented to person, place, and time.    Psychiatric:         Mood and Affect: Mood normal.         Behavior: Behavior normal.         LABORATORY DATA: Reviewed  Lab Results   Component Value Date    WBC 6.0 07/18/2022    HGB 9.8 (L) 07/18/2022    HCT 31.2 (L) 07/18/2022    MCV 90.8 07/18/2022     07/18/2022     (L) 07/18/2022    K 4.4 07/18/2022    CL 97 (L) 07/18/2022    CO2 21 07/18/2022    BUN 10 07/18/2022    CREATININE 0.63 (L) 07/18/2022    LABPROT 7.7 04/19/2012    LABALBU 2.8 (L) 07/18/2022    BILITOT 1.01 07/18/2022    ALKPHOS 175 (H) 07/18/2022    AST 43 (H) 07/18/2022    ALT 17 07/18/2022    INR 1.4 06/07/2022         Lab Results   Component Value Date    RBC 3.44 (L) 07/18/2022    HGB 9.8 (L) 07/18/2022    MCV 90.8 07/18/2022    MCH 28.4 07/18/2022    MCHC 31.3 07/18/2022    RDW 17.0 (H) 07/18/2022    MPV 6.1 07/18/2022 BASOPCT 0 07/06/2022    LYMPHSABS 0.50 (L) 07/06/2022    MONOSABS 1.30 07/06/2022    NEUTROABS 5.80 07/06/2022    EOSABS 0.10 07/06/2022    BASOSABS 0.00 07/06/2022         DIAGNOSTIC TESTING:     IR US GUIDED PARACENTESIS    Result Date: 6/28/2022  PROCEDURE: PARACENTESIS WITH IMAGE GUIDANCE US ABDOMEN LIMITED 6/28/2022 HISTORY: ORDERING SYSTEM PROVIDED HISTORY: Other ascites TECHNOLOGIST PROVIDED HISTORY: ascites TECHNIQUE: Informed consent was obtained after a detailed explanation of the procedure including risks, benefits, and alternatives. Universal protocol was followed. A limited ultrasound of the abdomen was performed and shows large amount of ascites. The right abdomen was prepped and draped in sterile fashion and local anesthesia was achieved with lidocaine. A 5 Persian needle sheath was advanced into ascites using realtime ultrasound guidance and paracentesis was performed. The patient tolerated the procedure well. EBL: None FINDINGS: Limited ultrasound of the abdomen demonstrates ascites. A total of 10.8 L of clear yellow fluid was removed. Supplemental albumin was given intravenously. Successful ultrasound-guided paracentesis. Assessment  No diagnosis found. Plan  Renal function satisfactory. Given patient's symptoms, he may need abdominal paracentesis and this is arranged. Advised salt for albumin during the paracentesis. Also advised to have labs in the next 2 weeks. This patient is known to have esophageal cancer treated with radiation and chemo. He was not a candidate for surgery. Advised to follow-up with oncology service as well. Thank you for allowing me to participate in the care of Mr. Vic Quiles. For any further questions please do not hesitate to contact me. I have reviewed and agree with the ROS entered by the MA/LPN. Note is dictated utilizing voice recognition software. Unfortunately this leads to occasional typographical errors.  Please contact our office if you have any questions        Nicolás Santana MD,FACP, [facility]  Board Certified in Gastroenterology and [address] [place] [address] Gastroenterology  Office #: [phone]

## 2022-07-20 RX ORDER — SODIUM CHLORIDE 9 MG/ML
INJECTION, SOLUTION INTRAVENOUS PRN
Status: CANCELLED | OUTPATIENT
Start: 2022-07-20

## 2022-07-20 RX ORDER — SODIUM CHLORIDE 0.9 % (FLUSH) 0.9 %
5-40 SYRINGE (ML) INJECTION EVERY 12 HOURS SCHEDULED
Status: CANCELLED | OUTPATIENT
Start: 2022-07-20

## 2022-07-20 RX ORDER — SODIUM CHLORIDE 0.9 % (FLUSH) 0.9 %
5-40 SYRINGE (ML) INJECTION PRN
Status: CANCELLED | OUTPATIENT
Start: 2022-07-20

## 2022-07-21 ENCOUNTER — TELEPHONE (OUTPATIENT)
Dept: PRIMARY CARE CLINIC | Age: 62
End: 2022-07-21

## 2022-07-21 ENCOUNTER — HOSPITAL ENCOUNTER (OUTPATIENT)
Dept: INTERVENTIONAL RADIOLOGY/VASCULAR | Age: 62
Discharge: HOME | End: 2022-07-23
Payer: MEDICARE

## 2022-07-21 VITALS
HEIGHT: 70 IN | HEART RATE: 72 BPM | RESPIRATION RATE: 16 BRPM | BODY MASS INDEX: 30.92 KG/M2 | OXYGEN SATURATION: 100 % | TEMPERATURE: 97.3 F | SYSTOLIC BLOOD PRESSURE: 106 MMHG | DIASTOLIC BLOOD PRESSURE: 58 MMHG | WEIGHT: 216 LBS

## 2022-07-21 DIAGNOSIS — K70.31 ASCITES DUE TO ALCOHOLIC CIRRHOSIS (HCC): ICD-10-CM

## 2022-07-21 LAB
INR BLD: 1.1
PARTIAL THROMBOPLASTIN TIME: 33.9 SEC (ref 24–36)
PROTHROMBIN TIME: 14.6 SEC (ref 11.8–14.6)

## 2022-07-21 PROCEDURE — 6360000002 HC RX W HCPCS: Performed by: RADIOLOGY

## 2022-07-21 PROCEDURE — 2580000003 HC RX 258: Performed by: RADIOLOGY

## 2022-07-21 PROCEDURE — 7100000010 HC PHASE II RECOVERY - FIRST 15 MIN

## 2022-07-21 PROCEDURE — 7100000011 HC PHASE II RECOVERY - ADDTL 15 MIN

## 2022-07-21 PROCEDURE — 36415 COLL VENOUS BLD VENIPUNCTURE: CPT

## 2022-07-21 PROCEDURE — 85730 THROMBOPLASTIN TIME PARTIAL: CPT

## 2022-07-21 PROCEDURE — 2709999900 IR US GUIDED PARACENTESIS

## 2022-07-21 PROCEDURE — P9047 ALBUMIN (HUMAN), 25%, 50ML: HCPCS | Performed by: RADIOLOGY

## 2022-07-21 PROCEDURE — P9047 ALBUMIN (HUMAN), 25%, 50ML: HCPCS | Performed by: INTERNAL MEDICINE

## 2022-07-21 PROCEDURE — 49083 ABD PARACENTESIS W/IMAGING: CPT

## 2022-07-21 PROCEDURE — 6360000002 HC RX W HCPCS: Performed by: INTERNAL MEDICINE

## 2022-07-21 PROCEDURE — 85610 PROTHROMBIN TIME: CPT

## 2022-07-21 RX ORDER — HEPARIN SODIUM (PORCINE) LOCK FLUSH IV SOLN 100 UNIT/ML 100 UNIT/ML
500 SOLUTION INTRAVENOUS PRN
Status: DISCONTINUED | OUTPATIENT
Start: 2022-07-21 | End: 2022-07-24 | Stop reason: HOSPADM

## 2022-07-21 RX ORDER — SODIUM CHLORIDE 9 MG/ML
INJECTION, SOLUTION INTRAVENOUS PRN
Status: DISCONTINUED | OUTPATIENT
Start: 2022-07-21 | End: 2022-07-24 | Stop reason: HOSPADM

## 2022-07-21 RX ORDER — ALBUMIN (HUMAN) 12.5 G/50ML
50 SOLUTION INTRAVENOUS ONCE
Status: COMPLETED | OUTPATIENT
Start: 2022-07-21 | End: 2022-07-21

## 2022-07-21 RX ORDER — SODIUM CHLORIDE 0.9 % (FLUSH) 0.9 %
5-40 SYRINGE (ML) INJECTION PRN
Status: DISCONTINUED | OUTPATIENT
Start: 2022-07-21 | End: 2022-07-24 | Stop reason: HOSPADM

## 2022-07-21 RX ORDER — ACETAMINOPHEN 325 MG/1
650 TABLET ORAL EVERY 4 HOURS PRN
Status: DISCONTINUED | OUTPATIENT
Start: 2022-07-21 | End: 2022-07-24 | Stop reason: HOSPADM

## 2022-07-21 RX ORDER — SODIUM CHLORIDE 0.9 % (FLUSH) 0.9 %
5-40 SYRINGE (ML) INJECTION EVERY 12 HOURS SCHEDULED
Status: DISCONTINUED | OUTPATIENT
Start: 2022-07-21 | End: 2022-07-24 | Stop reason: HOSPADM

## 2022-07-21 RX ADMIN — ALBUMIN (HUMAN) 50 G: 0.25 INJECTION, SOLUTION INTRAVENOUS at 10:50

## 2022-07-21 RX ADMIN — SODIUM CHLORIDE, PRESERVATIVE FREE 10 ML: 5 INJECTION INTRAVENOUS at 13:17

## 2022-07-21 RX ADMIN — Medication 500 UNITS: at 13:17

## 2022-07-21 RX ADMIN — ALBUMIN (HUMAN) 50 G: 0.25 INJECTION, SOLUTION INTRAVENOUS at 12:11

## 2022-07-21 ASSESSMENT — ENCOUNTER SYMPTOMS
SHORTNESS OF BREATH: 1
BACK PAIN: 1
ABDOMINAL DISTENTION: 1

## 2022-07-21 ASSESSMENT — PAIN - FUNCTIONAL ASSESSMENT
PAIN_FUNCTIONAL_ASSESSMENT: NONE - DENIES PAIN

## 2022-07-21 NOTE — DISCHARGE INSTRUCTIONS
DISCHARGE INSTRUCTIONS FOR PARACENTESIS    In order to continue your care at home, please follow the instructions below. Medications    Use over-the-counter pain medication as directed on bottle for pain control    Post Procedure Site/Care/Activity  For up to 2 days after the procedure, you may have a small amount of clear fluid coming out of the site where the needle was inserted, especially if you had a lot of fluid removed. You may need to change the bandage on the site. Do not lie totally flat until morning. You can do your normal activities after the procedure, unless instructed differently. Call your doctor or seek immediate medical care if:   You have symptoms of infection, such as increased pain, swelling, warmth, or redness, red streaks or pus. An oral temperature (by mouth) is 101 degrees or higher (fever), chills, fever. You are dizzy or lightheaded, or you feel like you may faint. You have new or worse belly pain. Watch closely for changes in your health, and be sure to contact your doctor if:   Fluid builds up in your belly again. You do not get better as expected.       IF YOU CANNOT REACH THE DOCTOR, GO TO THE NEAREST EMERGENCY ROOM OR CALL 911    Phone: Interventional Radiology   [phone]    Dr Dennie Blood  After hours Radiology   [phone]     13.7 Liters removed

## 2022-07-21 NOTE — BRIEF OP NOTE
Brief Postoperative Note for Paracentesis    Nilson Ramírez  YOB: 1960  [id number]    Pre-operative Diagnosis: Ascites      Post-operative Diagnosis: Same    Procedure: Ultrasound guided Paracentesis     Anesthesia: 1% Lidocaine     Surgeons/Assistants: Annette Leigh MD    Complications: none    Specimens: were not obtained    Ultrasound guided paracentesis performed. 65481 ml clear yellow fluid obtained from RLQ. Dressing applied. Vital signs were reviewed and were stable after the procedure. Discharged to [facility] for Albumin (total 100 gms IV).       Electronically signed by Annette Leigh MD on 7/21/2022 at 11:45 AM

## 2022-07-21 NOTE — H&P
HISTORY and [id number]       NAME:  Frankie Haynes  MRN: [id number]   YOB: 1960   Date: 7/21/2022   Age: 57 y.o. Gender: male       COMPLAINT AND PRESENT HISTORY:   Frankie Haynes is 57 y.o.,  male, here today for paracentesis. Patient is receiving treatments paracentesis    Patient has history of allochroic Liver cirrhosis with ascites,  Hep C  And he receiving paracentesis treatments once every month. Last paracentesis on 6/28/22 with A total of 10.8 L of clear yellow fluid was removed Patient was given supplement IV albumin. Patient reports significant relief of symptoms. Pt states he has not pain but he feel discomfort. He complain of SOB improved by sitting in chair, worse when lying flat. Patient states he has easy bleeding/bruising. Patient denies swelling in the lower legs bilaterally. No other concerns today, no recent fever/chills, no chest pain, nausea /vomiting, diarrhea or constipation. Review of additional significant medical hx:  HTN, Ione, right chest port      NPO status: Pt NPO since the past midnight  Medications taken TODAY (with sip of water): none  Denies personal hx of blood clots. Denies personal hx of MRSA infection. Denies any personal or family hx of previous complications w/anesthesia.     PAST MEDICAL HISTORY     Past Medical History:   Diagnosis Date    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 ([address]     Anxiety     Arthritis     Back pain, chronic     dr. Bethany Nevarez, orthopedic, every 3-4 months, gets steroid injection    Lezama esophagus     BPH (benign prostatic hypertrophy)     Cholelithiasis     Cirrhosis ([address]     COVID-19 12/2020    pt reports he had a positive test while at [facility] in 2020, was asymptomatic    COVID-19 vaccine series completed 5/20/2021, 6/22/2021    Moderna 5/20/2021, 6/22/2021    DDD (degenerative disc disease), lumbar     Depression     Esophageal cancer ([address]     INVASIVE ADENOCARCINOMA ARISING IN TUBULAR ADENOMA WITH HIGH GRADE DYSPLASIA, ASSOCIATED WITH FOCAL INTESTINAL METAPLASIA     Esophageal varices (HCC)     Fatty liver     GERD (gastroesophageal reflux disease)     Hep C w/o coma, chronic ([address]     History of alcohol abuse     6-12 beers a day; quit drinking July 2016    History of blood transfusion     History of colon polyps 2016    History of tobacco abuse     [place] (hard of hearing)     Hyperlipidemia     Hypertension     Port-A-Cath in place     right upper chest    Sciatica     Spinal stenosis     Stomach ulcer     hx of    Tubular adenoma of colon 2016, 2018    Vitamin D deficiency     Wears glasses        SURGICAL HISTORY       Past Surgical History:   Procedure Laterality Date    BUNIONECTOMY      twice on right side    BUNIONECTOMY Left     CARPAL TUNNEL RELEASE Right     COLONOSCOPY      at age 35    COLONOSCOPY  10/05/2016    polyps-pathology tubular adenoma, and abnormal looking mucosa right colon-pathology-tubular adenoma    COLONOSCOPY N/A 3/30/2018    COLONOSCOPY POLYPECTOMY COLD BIOPSY performed by Benji Buitrago MD at [place] [address]  03/30/2018    Small polyp in the sigmoid colon and excised with biopsy forceps--tubular adenoma    COLONOSCOPY N/A 4/16/2022    COLONOSCOPY POLYPECTOMY performed by Benji Buitrago MD at [address] [place] [address], [place], DIAGNOSTIC      EGD    IR PORT PLACEMENT EQUAL OR GREATER THAN 5 YEARS  4/19/2021    IR PORT PLACEMENT EQUAL OR GREATER THAN 5 YEARS 4/19/2021 [facility] PROCEDURES    KNEE SURGERY Left     cyst removed    NASAL SEPTUM SURGERY      NERVE BLOCK Right 11/23/2020    NERVE BLOCK RIGHT CERVICAL STEROID INJECTION  C3-C6 performed by Betty Brandon MD at [address] [place] [address]  01/04/16    steroid injection C7 T1    OTHER SURGICAL HISTORY  11/21/2016    Bilateral Lumbar CACHORRO L4-L5 injections    OTHER SURGICAL HISTORY  12/19/2016    lumbar steroid injection    OTHER SURGICAL HISTORY  09/28/2018    BILATERAL L5 CACHORRO (N/A Back) OTHER SURGICAL HISTORY Right 11/23/2020    cervical injection    PAIN MANAGEMENT PROCEDURE Left 7/9/2020    EPIDURAL STEROID INJECTION LEFT L4 L5 performed by Brigid Pratt MD at [address] Left 7/20/2020    LEFT L4 L5 EPIDURAL STEROID INJECTION performed by Brigid Pratt MD at [address] Bilateral 8/17/2020    LUMBAR FACET BILATERAL L2-L5 performed by Brigid Pratt MD at [address] Bilateral 12/7/2020    NERVE BLOCK BILATERAL LUMBAR MEDIAL BRANCH L2-L5 performed by Brigid Pratt MD at [facility] AA&/STRD TFRML EPI LUMBAR/SACRAL 1 LEVEL Bilateral 9/6/2018    BILATERAL L5 CACHORRO performed by Brigid Pratt MD at [facility] [address] AA&/STRD TFRML EPI LUMBAR/SACRAL 1 LEVEL N/A 9/28/2018    BILATERAL L5 CACHORRO performed by Brigid Pratt MD at [address] [place] [address] N/CARPAL TUNNEL SURG Right 8/29/2017    CARPAL TUNNEL RELEASE RIGHT performed by Ben Ng MD at [address] [place] [address] N/CARPAL TUNNEL SURG Left 10/31/2017    CARPAL TUNNEL RELEASE performed by Ben Ng MD at [address] [place] [address] 12/29/2020    EGD BIOPSY performed by Andrea Ritter MD at [address] [place] [address] N/A 2/2/2021    EGD BIOPSY and spot marking performed by Meggan Camarillo MD at [address] [place] [address] N/A 2/12/2021    ENDOSCOPIC ULTRASOUND, EGD performed by Wen العلي MD at [address] [place] [address]  2/12/2021    EGD DIAGNOSTIC ONLY performed by Wen العلي MD at [address] [place] [address] 8/31/2021    EGD BIOPSY performed by Meggan Camarillo MD at [address] [place] [address] 1/21/2022    EGD BIOPSY performed by Meggan Camarillo MD at [address] [place] [address] N/A 4/15/2022    EGD ESOPHAGOGASTRODUODENOSCOPY performed by Andrea Ritter MD at [place] [address] GASTROINTESTINAL ENDOSCOPY N/A 2022    EGD BAND LIGATION performed by Bryan Frank MD at [facility] N/A 2022    EGD ESOPHAGOGASTRODUODENOSCOPY performed by Bryan Frank MD at [address] [facility] [facility], [facility]       Family History   Problem Relation Age of Onset    Cancer Mother         pancreatic    Cancer Father         bone    Diabetes Sister     Asthma Brother        SOCIAL HISTORY       Social History     Socioeconomic History    Marital status: Single     Spouse name: None    Number of children: None    Years of education: None    Highest education level: None   Tobacco Use    Smoking status: Former     Packs/day: 1.00     Years: 45.00     Pack years: 45.00     Types: Cigarettes     Quit date: 2017     Years since quittin.5    Smokeless tobacco: Never   Vaping Use    Vaping Use: Never used   Substance and Sexual Activity    Alcohol use: Not Currently     Comment: quit     Drug use: Not Currently     Frequency: 1.0 times per week     Comment: cocaine,  stopped spring 2016    Sexual activity: Yes     Partners: Female   Social History Narrative     in the past, retired     Social Determinants of Health     Financial Resource Strain: Low Risk     Difficulty of Paying Living Expenses: Not hard at all   Food Insecurity: No Food Insecurity    Worried About [facility] in the Last Year: Never true    [facility] in the Last Year: Never true   Physical Activity: Inactive    Days of Exercise per Week: 0 days    Minutes of Exercise per Session: 0 min           REVIEW OF SYSTEMS      No Known Allergies    Current Outpatient Medications on File Prior to Encounter   Medication Sig Dispense Refill    furosemide (LASIX) 20 MG tablet Take 1 tablet by mouth 2 times daily 60 tablet 3    spironolactone (ALDACTONE) 100 MG tablet Take 1 tablet by mouth every evening (Patient taking differently: Take 100 mg by mouth every evening Take 1 tablet at 100mg every other day, Other days take 1/2 tablet) 30 tablet 1    pantoprazole (PROTONIX) 40 MG tablet Take 1 tablet by mouth 2 times daily for 14 days 28 tablet 0    sucralfate (CARAFATE) 1 GM tablet Take 1 tablet by mouth 4 times daily for 14 days 56 tablet 0    vitamin D (CHOLECALCIFEROL) 25 MCG (1000 UT) TABS tablet Take 1,000 Units by mouth daily  (Patient not taking: Reported on 7/21/2022)      ondansetron (ZOFRAN-ODT) 4 MG disintegrating tablet dissolve 1 tablet by mouth every 8 hours if needed for nausea and vomiting 10 tablet 0    lactulose (CHRONULAC) 10 GM/15ML solution take 30 milliliters by mouth three times a day 473 mL 1    nadolol (CORGARD) 20 MG tablet take 1 tablet by mouth once daily      FLUoxetine (PROZAC) 20 MG capsule Take 1 capsule by mouth daily 30 capsule 3    atorvastatin (LIPITOR) 20 MG tablet Take 1 tablet by mouth nightly 30 tablet 3    midodrine (PROAMATINE) 5 MG tablet Take 1 tablet by mouth 3 times daily as needed (SBP <110) 90 tablet 3    ferrous sulfate (IRON 325) 325 (65 Fe) MG tablet Take 1 tablet by mouth 2 times daily 60 tablet 5     No current facility-administered medications on file prior to encounter. Review of Systems   Constitutional:  Positive for unexpected weight change. Pt lost weight for about 60 pound over the last year. HENT:  Positive for hearing loss. Eyes:  Positive for visual disturbance. Eye glasses    Respiratory:  Positive for shortness of breath. Cardiovascular: Negative. Negative for chest pain, palpitations and leg swelling. Gastrointestinal:  Positive for abdominal distention. Ascites    Genitourinary: Negative. Musculoskeletal:  Positive for back pain. Hematological:  Bruises/bleeds easily. Psychiatric/Behavioral: Negative.          GENERAL PHYSICAL EXAM     Vitals: /66   Pulse 89   Temp 97.1 °F (36.2 °C) (Infrared)   Resp 20   Ht 5' 10\" (1.778 m)   Wt 216 lb (98 kg) SpO2 96%   BMI 30.99 kg/m²  Body mass index is 30.99 kg/m². GENERAL APPEARANCE:   Jojo Burr is 57 y.o.,  male, not obese, nourished, conscious, alert. Does not appear to be distress or pain at this time. Physical Exam  Constitutional:       Appearance: Normal appearance. HENT:      Head: Normocephalic. Right Ear: External ear normal.      Left Ear: External ear normal.      Nose: Nose normal.      Mouth/Throat:      Mouth: Mucous membranes are moist.   Eyes:      General:         Right eye: No discharge. Left eye: No discharge. Cardiovascular:      Rate and Rhythm: Normal rate and regular rhythm. Pulses: Normal pulses. Radial pulses are 2+ on the right side and 2+ on the left side. Popliteal pulses are 2+ on the right side and 2+ on the left side. Dorsalis pedis pulses are 2+ on the right side and 2+ on the left side. Heart sounds: No murmur heard. No gallop. Pulmonary:      Effort: Pulmonary effort is normal.      Breath sounds: Normal breath sounds. No wheezing or rhonchi. Abdominal:      General: Bowel sounds are normal. There is distension. Musculoskeletal:         General: No swelling, tenderness, deformity or signs of injury. Normal range of motion. Cervical back: Normal range of motion and neck supple. Right lower leg: No edema. Left lower leg: No edema. Skin:     General: Skin is warm and dry. Findings: Bruising present. Comments: Multiple bruises bilateral upper arms    Neurological:      General: No focal deficit present. Mental Status: He is alert and oriented to person, place, and time. Motor: No weakness.       Gait: Gait normal.   Psychiatric:         Mood and Affect: Mood normal.         Behavior: Behavior normal.                 PROVISIONAL DIAGNOSES / SURGERY:      IR US guided paracentesis     Patient Active Problem List    Diagnosis Date Noted    Secondary esophageal varices ([address] 06/07/2022    Esophageal polyp 06/07/2022    Drop in hemoglobin 06/03/2022    Portal hypertension (HCC)     Shortness of breath     Ascites 04/26/2022    Acute kidney failure, unspecified ([address] 04/21/2022    Muscle weakness (generalized) 07/28/2016    Other abnormalities of gait and mobility 07/28/2016    Anemia 04/13/2022    Acute kidney injury ([address] 04/13/2022    Esophageal adenocarcinoma (HCC)     Low hemoglobin 12/20/2021    Symptomatic anemia, microcytic, acute 12/20/2021    Hypotension 12/20/2021    Former smoker, 50+ pack years, quit 2016 12/20/2021    HLD (hyperlipidemia) 12/20/2021    Abnormal findings on diagnostic imaging of spine 12/14/2021    Cervical spinal stenosis 12/14/2021    Spinal stenosis of lumbar region with neurogenic claudication 12/14/2021    Severe comorbid illness 11/30/2021    Gait instability 11/30/2021    Current smoker 04/05/2021    COVID-19 02/23/2021    Anxiety 02/23/2021    Malignant neoplasm of lower third of esophagus ([address]     Hypocalcemia 12/26/2020    Hypophosphatemia 12/26/2020    Gastrointestinal hemorrhage with melena     Alcohol abuse     Altered mental status     Acute gastrointestinal bleeding 12/23/2020    Thrombocytopenia ([address] 12/23/2020    Hepatitis C virus infection resolved after antiviral drug therapy 12/23/2020    Cervical facet syndrome 11/23/2020    Lumbar facet arthropathy 08/17/2020    Elevated LFTs 08/12/2020    Seasonal allergies 08/12/2020    S/P epidural steroid injection 08/05/2020    Essential hypertension 04/24/2019    Recurrent major depressive disorder in partial remission ([address] 04/24/2019    Pure hypercholesterolemia 02/04/2019    Hypokalemia 02/04/2019    Vitamin D deficiency 09/20/2017    History of hepatitis C 09/11/2017    Ganglion cyst 05/31/2017    Carpal tunnel syndrome of right wrist 05/31/2017    Tinnitus 03/23/2017    Eustachian tube dysfunction 03/23/2017    Lumbar radiculitis 11/08/2016    Lumbar disc herniation 11/08/2016    Gynecomastia, male 10/26/2016    Depression 10/13/2016    Vertebrogenic low back pain 10/06/2016    DDD (degenerative disc disease), lumbar 10/06/2016    Hepatic cirrhosis ([facility] 09/15/2016    Psychophysiologic insomnia 09/14/2016    Cirrhosis ([facility]     Hep C w/o coma, chronic (HCC)     Fatty liver     Calculus of gallbladder without cholecystitis 08/10/2016    Chronic viral hepatitis B without delta agent and without coma ([facility] 07/22/2016    Hypomagnesemia     Alcohol withdrawal syndrome without complication ([facility] 70/15/2273    Cervical radicular pain 01/04/2016    Tubular adenoma of colon 01/01/2016    History of colon polyps 01/01/2016    Back pain, chronic 04/19/2012    Hearing difficulty 04/19/2012    GERD (gastroesophageal reflux disease) 04/19/2012           MASSIMO Hadley - CNP on 7/21/2022 at 10:16 AM

## 2022-07-21 NOTE — PROGRESS NOTES
Alert and oriented. US in use. Rt abd prepped draped. Numbed and accessed by Dr Adele Don. Obtained 13.7L of cloudy yellow fluid. Access removed, dressing applied.  Tolerated well Report called to [facility]

## 2022-07-21 NOTE — TELEPHONE ENCOUNTER
Karena Salazar with homehealth called he had 13 L of fluid removed via paracentesis today. He was wobbly and dizzy when she arrived. He was sent home with a BP of 95/57 when she check it it was 88/50 with HR of 95. Spoke with Dr. Adarsh Ji in Lopez's absence she advised he drink 2 cups of chicken broth a normal size bottle of Gatorade and his normal dose of Midodrine. Karena Salazar is going to also go back out and check on him to [place] and instruct him if he gets any worse or has any falls to call 911 to go to ED.

## 2022-07-25 ENCOUNTER — HOSPITAL ENCOUNTER (OUTPATIENT)
Dept: CT IMAGING | Age: 62
Discharge: HOME | End: 2022-07-27
Payer: MEDICARE

## 2022-07-25 DIAGNOSIS — C15.9 ESOPHAGEAL ADENOCARCINOMA (HCC): ICD-10-CM

## 2022-07-25 PROCEDURE — 6360000002 HC RX W HCPCS: Performed by: INTERNAL MEDICINE

## 2022-07-25 PROCEDURE — 6360000004 HC RX CONTRAST MEDICATION: Performed by: INTERNAL MEDICINE

## 2022-07-25 PROCEDURE — 74177 CT ABD & PELVIS W/CONTRAST: CPT

## 2022-07-25 PROCEDURE — 2580000003 HC RX 258: Performed by: INTERNAL MEDICINE

## 2022-07-25 RX ORDER — 0.9 % SODIUM CHLORIDE 0.9 %
100 INTRAVENOUS SOLUTION INTRAVENOUS ONCE
Status: COMPLETED | OUTPATIENT
Start: 2022-07-25 | End: 2022-07-25

## 2022-07-25 RX ORDER — HEPARIN SODIUM (PORCINE) LOCK FLUSH IV SOLN 100 UNIT/ML 100 UNIT/ML
500 SOLUTION INTRAVENOUS ONCE
Status: COMPLETED | OUTPATIENT
Start: 2022-07-25 | End: 2022-07-25

## 2022-07-25 RX ORDER — SODIUM CHLORIDE 0.9 % (FLUSH) 0.9 %
10 SYRINGE (ML) INJECTION PRN
Status: DISCONTINUED | OUTPATIENT
Start: 2022-07-25 | End: 2022-07-28 | Stop reason: HOSPADM

## 2022-07-25 RX ADMIN — SODIUM CHLORIDE, PRESERVATIVE FREE 10 ML: 5 INJECTION INTRAVENOUS at 15:20

## 2022-07-25 RX ADMIN — IOHEXOL 50 ML: 240 INJECTION, SOLUTION INTRATHECAL; INTRAVASCULAR; INTRAVENOUS; ORAL at 15:20

## 2022-07-25 RX ADMIN — SODIUM CHLORIDE 100 ML: 9 INJECTION, SOLUTION INTRAVENOUS at 15:21

## 2022-07-25 RX ADMIN — IOPAMIDOL 75 ML: 755 INJECTION, SOLUTION INTRAVENOUS at 15:21

## 2022-07-25 RX ADMIN — Medication 500 UNITS: at 15:19

## 2022-07-26 ENCOUNTER — HOSPITAL ENCOUNTER (OUTPATIENT)
Dept: PHYSICAL THERAPY | Age: 62
Setting detail: THERAPIES SERIES
Discharge: HOME | End: 2022-07-26
Payer: MEDICARE

## 2022-07-26 PROCEDURE — 97161 PT EVAL LOW COMPLEX 20 MIN: CPT

## 2022-07-26 NOTE — CONSULTS
[facility] Outpatient Physical Therapy  [address]         Phone: [phone]       Fax: [phone]     Physical Therapy Spine Evaluation    Date:  2022  Patient: Fernando Orozco  : [id number]  MRN: [id number]  Physician: VASU Velarde     Insurance: [address] [place] [address] (Sydni Dinning required after evaluation)   Medical Diagnosis:   K70.31 ([id number]-IR) - Alcoholic cirrhosis of liver with ascites ([facility] [address]   M48.062 (ICD-10-CM) - Spinal stenosis of lumbar region with neurogenic claudication   Z91.81 (ICD-10-CM) - At high risk for falls     Rehab Codes: M54.5   Onset Date: chronic   Next 's appt. : 23  Visit Count:    Cancel/No Show: 0/0    Subjective:   CC: low back pain   HPI: Reports history of low back pain. Patient says bending over and walking make it worse. He feels wobbly when on his feet. Patient has ascites and has paracentesis done every month. Last week he had 13L of fluid taken off of his abdomen. Says his back feels better after the paracentesis. Says last week he had a hypotensive episode as well. In remission from esophageal cancer. Says he cannot walk for long and a full grocery store trip is too hard for him. Says he can only walk for 5-10' before his back start to tighten up. History of falls.     PMHx: [] Unremarkable [] Diabetes [x] HTN  [] Pacemaker   [] MI/Heart Problems [x] Cancer [x] Arthritis [x] Other:DDD,               [x] Refer to full medical chart  In EPIC     Comorbidities:   [] Obesity [] Dialysis  [] Other:   [] Asthma/COPD [] Dementia [] Other:   [] Stroke [] Sleep apnea [] Other:   [] Vascular disease [] Rheumatic disease [] Other:     Tests: [x] X-Ray: - DDD, chronic compression at T12 [] MRI:  [] Other:    Medications: [x] Refer to full medical record [] None [] Other:  Allergies:      [x] Refer to full medical record [] None [] Other:    Function:  Hand Dominance  [x] Right  [] Left  Working:  [] Normal Duty [] Light Duty  [] Off D/T Condition  [x] Retired  [] Not Employed                  []  Disability  [] Other:           Return to work:   Job/ADL Description:    Independent with ADLs/IADLs, Drives       Gait Prior level of function Current level of function    [x] Independent  [] Assist [x] Independent  [] Assist   Device: [x] Independent [x] Independent    [] Straight Cane [] Quad cane [] Straight Cane [] Quad cane    [] Standard walker [] Rolling walker   [] 4 wheeled walker [] Standard walker [] Rolling walker   [] 4 wheeled walker    [] Wheelchair [] Wheelchair     Pain:  [x] Yes  [] No Location: low back Pain Rating: (0-10 scale) 7/10  Pain altered Tx:  [] Yes  [] No  Action:    Symptoms:  [] Improving [x] Worsening [] Same  Better:  [] AM    [] PM    [x] Sit    [] Rise/Sit    []Stand    [] Walk    [x] Lying    [] Other:  Worse: [] AM    [] PM    [] Sit    [] Rise/Sit    [x]Stand    [x] Walk    [] Lying    [x] Bend                             [] Valsalva    [] Other:  Sleep: [x] OK    [] Disturbed    Objective:      STRENGTH  STRENGTH  ROM    Left Right  Left Right Cervical    C5 Shld Abd   L1-2 Hip Flex - 4 Flexion    Shld Flexion   Hip Abd 4 4 Extension       Hip extension 3+ 3         Knee flexion 4 4-      Shld IR   L3-4 Knee Ext 4+ 4+ Rotation L R   Shld ER   L4 Ankle DF 4 4+ Sidebend L R   C6 Elb Flex   L5 EHL   Retraction    C7 Elb Ext   S1 Plant. Flex   Lumbar    C8 EPL   Abdominals-  Unable- +pain    Flexion WFL pain+   T1 Fing Abd   Erector Spinae   Extension WFL         Rotation L  R         Sidebend L WFL pain R  WFL          UE/LE                                                                  TESTS (+/-) LEFT RIGHT Not Tested   SLR [] sit [] supine   []   Hamstring (SLR) + + []   SKTC negative negative []   DKTC negative Negative  []   Slump/Dural   []   SI JT   []   REGAN negative Negative  []   Joint Mobility   []   Cerv. Comp   []   Cerv. Distraction   []   Cerv.  Alar/Transverse   [] Vertebral Artery   []   Adsons   []   Chiqui [place]   []   Lea Tests ? Pain ? Pain No Change Not Tested   RFIS [] [] [x] []   BOSSMAN [] [x] [] []   RFIL [] [] [x] []   REIL [] [] [] []   Rep Prot [] [] [] []   Rep Retract [] [] [] []       OBSERVATION No Deficit Deficit Not Tested Comments   Posture       Forward Head [] [] []    Rounded Shoulders [] [] []    Kyphosis [] [] []    Lordosis [] [] []    Lateral Shift [] [] []    Scoliosis [] [] []    Iliac Crest [] [] []    PSIS [] [] []    ASIS [] [] []    Genu Valgus [] [] []    Genu Varus [] [] []    Genu Recurvatum [] [] []    Pronation [] [] []    Supination [] [] []    Leg Length Discrp [] [] []    Slumped Sitting [] [] []    Palpation [x] [] []    Sensation [] [] []    Edema [] [] []    Neurological [] [] []        Patient stands and ambulates with forward flexed trunk due to excess weight due to ascites. Assessment:   Patient presents with signs and symptoms of diagnosis of chronic low back pain due to spinal stenosis. Patients forward flexed posture as additional weight due to excess fluid is contributing to his low back pain. He presents with abnormal posture, core and hip weakness, limited activity tolerance (standing/walking) with overall decline in function based on 58% impairment on Modified Oswestry questionnaire. Recommended aquatic therapy for patient to improve mobility and strength while decreasing pain considering he has a low activity tolerance to standing. However, patient is not receptive to aquatic therapy and prefers to do land based sessions. Patient would continue to benefit from skilled physical therapy services in order to address the above limitations to improve functional mobility and decrease pain for patient to complete ADLs/IADLs with least amount of compensation or restriction. LTGs: (to be met in 18 treatments)  ? Pain: to 3/10 at most for patient to participate fully in social life.    ? ROM: of lumbar spine to [facility] without pain for patient to don/doff socks with ease. ? Strength: of B LEs to 4+/5 to assist in stabilization to lumbar spine to decrease pain. Improve abdominal strength to at least  4-/5 to assist in stabilization to lumbar spine for patient to be able to lift objects safely without pain. ? Function: with score on Modified Oswestry to </= 29% impairment to improve standing/walking tolerance. Independent with Home Exercise Programs  Patient to report standing for >15' to complete IADLs with less breaks. STG: (to be met in 9 treatments)  Decrease pain to 5/10 at most to improve patients sleep. Patient to report walking for >10' with minimal pain to ease completion of IADLs. Patient goals: \"PAIN RELIEF, DUH! \"       Functional Assessment Used: Modified Oswestry questionnaire   Current Status Score: 29/50= 58% impairment   Goal Status Sore: </= to 29% impairment. Evaluation Complexity:  History (Personal factors, comorbidities) [] 0 [x] 1-2 [] 3+   Exam (limitations, restrictions) [] 1-2 [x] 3 [] 4+   Clinical presentation (progression) [x] Stable [] Evolving  [] Unstable   Decision Making [x] Low [] Moderate [] High    [x] Low Complexity [] Moderate Complexity [] High Complexity       Rehab Potential:  [] Good  [x] Fair  [] Poor   Suggested Professional Referral:  [x] No  [] Yes:  Barriers to Goal Achievement[de-identified]  [x] No  [] Yes:  Domestic Concerns:  [x] No  [] Yes:    Pt. Education:  [x] Plans/Goals, Risks/Benefits discussed  [] Home exercise program  Method of Education: [x] Verbal  [] Demo  [] Written  Comprehension of Education:  [x] Verbalizes understanding. [] Demonstrates understanding. [] Needs Review. [] Demonstrates/verbalizes understanding of HEP/Ed previously given.     Treatment Plan:  [x] Therapeutic Exercise   17410  [] Iontophoresis: 4 mg/mL Dexamethasone Sodium Phosphate  mAmin  29931   [] Therapeutic Activity  98574 [] Vasopneumatic cold with compression  10751    [x] Gait Training   [id number] [] Ultrasound   [id number]   [x] Neuromuscular Re-education  [id number] [] Electrical Stimulation Unattended  50003   [x] Manual Therapy  58563 [] Electrical Stimulation Attended  [id number]   [x] Instruction in HEP  [] Lumbar/Cervical Traction  [id number]   [] Aquatic Therapy   83219 [x] Cold/hotpack    [] Massage   13983      [] Dry Needling, 1 or 2 muscles  71411   [] Biofeedback, first 15 minutes   05715  [] Biofeedback, additional 15 minutes   98992 [] Dry Needling, 3 or more muscles  47485         Frequency:  2 x/week for 12 visits    Todays Treatment:  Modalities:   Precautions:   Exercises:  Exercise Reps/ Time Weight/ Level Comments   PPT       PPT with march      PPT Bent knee fall out      SLR      Hip abduction       Hip extension                   Other:    Specific Instructions for next treatment: HEP, core stabilization and hip strengthening, posture training, lumbar extension/up right posture during standing/ambulation. Treatment Charges: Mins Units   [] Evaluation       [x]  Low       []  Moderate       []  High 32 1   []  Modalities     []  Ther Exercise     []  Manual Therapy     []  Ther Activities     []  Aquatics     []  Neuromuscular     []  Gait Training     []  Dry Needling           1-2 muscles     []  Dry Needling           3 or more muscles     [] Vasocompression     []  Other       TOTAL TREATMENT TIME: 32 minutes     Time in: 135pm       Time out: 2:07pm     Electronically signed by: Taylor Barnes PT        Physician Signature:________________________________Date:__________________  By signing above or cosigning this note, I have reviewed this plan of care and certify a need for medically necessary rehabilitation services.      *PLEASE SIGN ABOVE AND FAX BACK ALL PAGES*

## 2022-08-02 ENCOUNTER — HOSPITAL ENCOUNTER (OUTPATIENT)
Age: 62
Discharge: HOME | End: 2022-08-02
Payer: MEDICARE

## 2022-08-02 DIAGNOSIS — K70.31 ASCITES DUE TO ALCOHOLIC CIRRHOSIS (HCC): ICD-10-CM

## 2022-08-02 LAB
ABSOLUTE EOS #: 0.1 K/UL (ref 0–0.4)
ABSOLUTE LYMPH #: 0.7 K/UL (ref 1–4.8)
ABSOLUTE MONO #: 1.1 K/UL (ref 0.1–1.3)
ALBUMIN SERPL-MCNC: 2.9 G/DL (ref 3.5–5.2)
ALP BLD-CCNC: 137 U/L (ref 40–129)
ALT SERPL-CCNC: 14 U/L (ref 5–41)
ANION GAP SERPL CALCULATED.3IONS-SCNC: 10 MMOL/L (ref 9–17)
AST SERPL-CCNC: 30 U/L
BASOPHILS # BLD: 1 % (ref 0–2)
BASOPHILS ABSOLUTE: 0 K/UL (ref 0–0.2)
BILIRUB SERPL-MCNC: 1.21 MG/DL (ref 0.3–1.2)
BUN BLDV-MCNC: 8 MG/DL (ref 8–23)
CALCIUM SERPL-MCNC: 8.5 MG/DL (ref 8.6–10.4)
CHLORIDE BLD-SCNC: 93 MMOL/L (ref 98–107)
CO2: 22 MMOL/L (ref 20–31)
CREAT SERPL-MCNC: 0.78 MG/DL (ref 0.7–1.2)
EOSINOPHILS RELATIVE PERCENT: 1 % (ref 0–4)
GFR AFRICAN AMERICAN: >60 ML/MIN
GFR NON-AFRICAN AMERICAN: >60 ML/MIN
GFR SERPL CREATININE-BSD FRML MDRD: ABNORMAL ML/MIN/{1.73_M2}
GLUCOSE BLD-MCNC: 99 MG/DL (ref 70–99)
HCT VFR BLD CALC: 24 % (ref 41–53)
HEMOGLOBIN: 7.3 G/DL (ref 13.5–17.5)
LYMPHOCYTES # BLD: 10 % (ref 24–44)
MCH RBC QN AUTO: 26.6 PG (ref 26–34)
MCHC RBC AUTO-ENTMCNC: 30.3 G/DL (ref 31–37)
MCV RBC AUTO: 87.7 FL (ref 80–100)
MONOCYTES # BLD: 16 % (ref 1–7)
PDW BLD-RTO: 15.9 % (ref 11.5–14.9)
PLATELET # BLD: 196 K/UL (ref 150–450)
PMV BLD AUTO: 7.4 FL (ref 6–12)
POTASSIUM SERPL-SCNC: 4.1 MMOL/L (ref 3.7–5.3)
RBC # BLD: 2.73 M/UL (ref 4.5–5.9)
SEG NEUTROPHILS: 72 % (ref 36–66)
SEGMENTED NEUTROPHILS ABSOLUTE COUNT: 5 K/UL (ref 1.3–9.1)
SODIUM BLD-SCNC: 125 MMOL/L (ref 135–144)
TOTAL PROTEIN: 5.4 G/DL (ref 6.4–8.3)
WBC # BLD: 6.9 K/UL (ref 3.5–11)

## 2022-08-02 PROCEDURE — 36415 COLL VENOUS BLD VENIPUNCTURE: CPT

## 2022-08-02 PROCEDURE — 85025 COMPLETE CBC W/AUTO DIFF WBC: CPT

## 2022-08-02 PROCEDURE — 80053 COMPREHEN METABOLIC PANEL: CPT

## 2022-08-04 ENCOUNTER — OFFICE VISIT (OUTPATIENT)
Dept: GASTROENTEROLOGY | Age: 62
End: 2022-08-04
Payer: MEDICARE

## 2022-08-04 VITALS
WEIGHT: 202 LBS | SYSTOLIC BLOOD PRESSURE: 110 MMHG | DIASTOLIC BLOOD PRESSURE: 73 MMHG | BODY MASS INDEX: 28.98 KG/M2 | HEART RATE: 92 BPM

## 2022-08-04 DIAGNOSIS — K70.31 ASCITES DUE TO ALCOHOLIC CIRRHOSIS (HCC): Primary | ICD-10-CM

## 2022-08-04 DIAGNOSIS — K70.31 ALCOHOLIC CIRRHOSIS OF LIVER WITH ASCITES (HCC): Primary | ICD-10-CM

## 2022-08-04 DIAGNOSIS — K22.711 BARRETT'S ESOPHAGUS WITH HIGH GRADE DYSPLASIA: ICD-10-CM

## 2022-08-04 DIAGNOSIS — B18.2 HEP C W/O COMA, CHRONIC (HCC): ICD-10-CM

## 2022-08-04 DIAGNOSIS — K70.30 ALCOHOLIC CIRRHOSIS, UNSPECIFIED WHETHER ASCITES PRESENT (HCC): ICD-10-CM

## 2022-08-04 PROCEDURE — APPSS30 APP SPLIT SHARED TIME 16-30 MINUTES: Performed by: NURSE PRACTITIONER

## 2022-08-04 PROCEDURE — 99213 OFFICE O/P EST LOW 20 MIN: CPT | Performed by: INTERNAL MEDICINE

## 2022-08-04 RX ORDER — MIDODRINE HYDROCHLORIDE 5 MG/1
10 TABLET ORAL 3 TIMES DAILY
Qty: 90 TABLET | Refills: 3 | Status: SHIPPED | OUTPATIENT
Start: 2022-08-04

## 2022-08-04 ASSESSMENT — ENCOUNTER SYMPTOMS
ABDOMINAL DISTENTION: 1
RESPIRATORY NEGATIVE: 1
ALLERGIC/IMMUNOLOGIC NEGATIVE: 1
BACK PAIN: 1

## 2022-08-04 NOTE — PROGRESS NOTES
GI OFFICE FOLLOW UP    INTERVAL HISTORY:   No referring provider defined for this encounter. HISTORY OF PRESENT ILLNESS:     Being seen for follow-up labs. Na 125  Hgb 7.8    Continues on diuretic therapy  Again has recurrent ascites  No significant BLE edema  States following 2 gm sodium diet    Reports increased dizziness. Past Medical,Family, and Social History reviewed and does contribute to the patient presenting condition. Patient's PMH/PSH,SH,PSYCH Hx, MEDs, ALLERGIES, and ROS were all reviewed and updated in the appropriate sections.  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 Tanya Swanson, orthopedic, every 3-4 months, gets steroid injection    Lezama esophagus     BPH (benign prostatic hypertrophy)     Cholelithiasis     Cirrhosis ([address]     COVID-19 12/2020    pt reports he had a positive test while at [facility] in 2020, was asymptomatic    COVID-19 vaccine series completed 5/20/2021, 6/22/2021    Moderna 5/20/2021, 6/22/2021    DDD (degenerative disc disease), lumbar     Depression     Esophageal cancer ([address]     INVASIVE ADENOCARCINOMA ARISING IN TUBULAR ADENOMA WITH HIGH GRADE DYSPLASIA, ASSOCIATED WITH FOCAL INTESTINAL METAPLASIA     Esophageal varices ([address]     Fatty liver     GERD (gastroesophageal reflux disease)     Hep C w/o coma, chronic ([address]     History of alcohol abuse     6-12 beers a day; quit drinking July 2016    History of blood transfusion     History of colon polyps 2016    History of tobacco abuse     [place] (hard of hearing)     Hyperlipidemia     Hypertension     Port-A-Cath in place     right upper chest    Sciatica     Spinal stenosis     Stomach ulcer     hx of    Tubular adenoma of colon 2016, 2018    Vitamin D deficiency     Wears glasses Past Surgical History:   Procedure Laterality Date    BUNIONECTOMY      twice on right side    BUNIONECTOMY Left     CARPAL TUNNEL RELEASE Right     COLONOSCOPY      at age 35    COLONOSCOPY  10/05/2016    polyps-pathology tubular adenoma, and abnormal looking mucosa right colon-pathology-tubular adenoma    COLONOSCOPY N/A 3/30/2018    COLONOSCOPY POLYPECTOMY COLD BIOPSY performed by Any Altamirano MD at [facility]  03/30/2018    Small polyp in the sigmoid colon and excised with biopsy forceps--tubular adenoma    COLONOSCOPY N/A 4/16/2022    COLONOSCOPY POLYPECTOMY performed by Any Altamirano MD at [facility], [address], DIAGNOSTIC      EGD    IR PORT PLACEMENT EQUAL OR GREATER THAN 5 YEARS  4/19/2021    IR PORT PLACEMENT EQUAL OR GREATER THAN 5 YEARS 4/19/2021 [facility] PROCEDURES    KNEE SURGERY Left     cyst removed    NASAL SEPTUM SURGERY      NERVE BLOCK Right 11/23/2020    NERVE BLOCK RIGHT CERVICAL STEROID INJECTION  C3-C6 performed by Tacos Mars MD at [address]  01/04/16    steroid injection C7 T1    OTHER SURGICAL HISTORY  11/21/2016    Bilateral Lumbar CACHORRO L4-L5 injections    OTHER SURGICAL HISTORY  12/19/2016    lumbar steroid injection    OTHER SURGICAL HISTORY  09/28/2018    BILATERAL L5 CACHORRO (N/A Back)    OTHER SURGICAL HISTORY Right 11/23/2020    cervical injection    PAIN MANAGEMENT PROCEDURE Left 7/9/2020    EPIDURAL STEROID INJECTION LEFT L4 L5 performed by Tacos Mars MD at [address] Left 7/20/2020    LEFT L4 L5 EPIDURAL STEROID INJECTION performed by Tacos Mars MD at [address] Bilateral 8/17/2020    LUMBAR FACET BILATERAL L2-L5 performed by Tacos Mars MD at [address] Bilateral 12/7/2020    NERVE BLOCK BILATERAL LUMBAR MEDIAL BRANCH L2-L5 performed by Tacos Mars MD at [facility]&/STRD TFRML EPI LUMBAR/SACRAL 1 LEVEL Bilateral 9/6/2018    BILATERAL L5 CACHORRO performed by Fracisco Vickers MD at [facility]&/STRD TFRML EPI LUMBAR/SACRAL 1 LEVEL N/A 9/28/2018    BILATERAL L5 CACHORRO performed by Fracisco Vikcers MD at [address] [place] [address] N/CARPAL TUNNEL SURG Right 8/29/2017    CARPAL TUNNEL RELEASE RIGHT performed by Demarco Jensen MD at [address] [place] [address] N/CARPAL TUNNEL SURG Left 10/31/2017    CARPAL TUNNEL RELEASE performed by Demarco Jensen MD at [facility]. 12/29/2020    EGD BIOPSY performed by Sophie Soliman MD at [facility]. 2/2/2021    EGD BIOPSY and spot marking performed by Sonja Nava MD at [place] [address]. 2/12/2021    ENDOSCOPIC ULTRASOUND, EGD performed by Alba Najera MD at [address] [place] [address]  2/12/2021    EGD DIAGNOSTIC ONLY performed by Alba Najera MD at [address] [place] [address] 8/31/2021    EGD BIOPSY performed by Sonja Nava MD at [facility]. 1/21/2022    EGD BIOPSY performed by Sonja Nava MD at [facility]. 4/15/2022    EGD ESOPHAGOGASTRODUODENOSCOPY performed by Sophie Soliman MD at [facility]. 6/6/2022    EGD BAND LIGATION performed by Elsie Snellen, MD at [place] [address]. 6/9/2022    EGD ESOPHAGOGASTRODUODENOSCOPY performed by Elsie Snellen, MD at [address]:    Current Outpatient Medications:     midodrine (PROAMATINE) 5 MG tablet, Take 2 tablets by mouth in the morning and 2 tablets at noon and 2 tablets before bedtime. , Disp: 90 tablet, Rfl: 3    furosemide (LASIX) 20 MG tablet, Take 1 tablet by mouth 2 times daily, Disp: 60 tablet, Rfl: 3    spironolactone (ALDACTONE) 100 MG tablet, Take 1 tablet by mouth every evening (Patient taking differently: Take 100 mg by mouth every evening Take 1 tablet at 100mg every other day, Other days take 1/2 tablet), Disp: 30 tablet, Rfl: 1    ondansetron (ZOFRAN-ODT) 4 MG disintegrating tablet, dissolve 1 tablet by mouth every 8 hours if needed for nausea and vomiting, Disp: 10 tablet, Rfl: 0    lactulose (CHRONULAC) 10 GM/15ML solution, take 30 milliliters by mouth three times a day, Disp: 473 mL, Rfl: 1    nadolol (CORGARD) 20 MG tablet, take 1 tablet by mouth once daily, Disp: , Rfl:     FLUoxetine (PROZAC) 20 MG capsule, Take 1 capsule by mouth daily, Disp: 30 capsule, Rfl: 3    atorvastatin (LIPITOR) 20 MG tablet, Take 1 tablet by mouth nightly, Disp: 30 tablet, Rfl: 3    ferrous sulfate (IRON 325) 325 (65 Fe) MG tablet, Take 1 tablet by mouth 2 times daily, Disp: 60 tablet, Rfl: 5    pantoprazole (PROTONIX) 40 MG tablet, Take 1 tablet by mouth 2 times daily for 14 days, Disp: 28 tablet, Rfl: 0    sucralfate (CARAFATE) 1 GM tablet, Take 1 tablet by mouth 4 times daily for 14 days, Disp: 56 tablet, Rfl: 0    vitamin D (CHOLECALCIFEROL) 25 MCG (1000 UT) TABS tablet, Take 1,000 Units by mouth daily  (Patient not taking: No sig reported), Disp: , Rfl:     ALLERGIES:   No Known Allergies    FAMILY HISTORY:       Problem Relation Age of Onset    Cancer Mother         pancreatic    Cancer Father         bone    Diabetes Sister     Asthma Brother          SOCIAL HISTORY:   Social History     Socioeconomic History    Marital status: Single     Spouse name: Not on file    Number of children: Not on file    Years of education: Not on file    Highest education level: Not on file   Occupational History    Not on file   Tobacco Use    Smoking status: Former     Packs/day: 1.00     Years: 45.00     Pack years: 45.00     Types: Cigarettes     Quit date: 2017     Years since quittin.5    Smokeless tobacco: Never   Vaping Use    Vaping Use: Never used   Substance and Sexual Activity    Alcohol use: Not Currently     Comment: quit 2019    Drug use: Not Currently     Frequency: 1.0 times per week     Comment: cocaine,  stopped spring 2016    Sexual activity: Yes     Partners: Female   Other Topics Concern    Not on file   Social History Narrative     in the past, retired     Social Determinants of Health     Financial Resource Strain: Low Risk     Difficulty of Paying Living Expenses: Not hard at all   Food Insecurity: No Food Insecurity    Worried About [address] [employer] in the Last Year: Never true    [address] [employer] in the Last Year: Never true   Transportation Needs: Not on file   Physical Activity: Inactive    Days of Exercise per Week: 0 days    Minutes of Exercise per Session: 0 min   Stress: Not on file   Social Connections: Not on file   Intimate Partner Violence: Not on file   Housing Stability: Not on file         REVIEW OF SYSTEMS:         Review of Systems   Constitutional: Negative. HENT: Negative. Eyes:  Positive for visual disturbance (glasses ). Respiratory: Negative. Cardiovascular: Negative. Gastrointestinal:  Positive for abdominal distention. Endocrine: Negative. Genitourinary: Negative. Musculoskeletal:  Positive for back pain. Skin: Negative. Allergic/Immunologic: Negative. Neurological: Negative. Hematological:  Bruises/bleeds easily. Psychiatric/Behavioral: Negative. PHYSICAL EXAMINATION:     Vital signs reviewed per the nursing documentation. /73   Pulse 92   Wt 202 lb (91.6 kg)   BMI 28.98 kg/m²   Body mass index is 28.98 kg/m². Physical Exam  Constitutional:       Appearance: Normal appearance. Eyes:      General: No scleral icterus. Pupils: Pupils are equal, round, and reactive to light. Cardiovascular:      Rate and Rhythm: Normal rate and regular rhythm. Heart sounds: Normal heart sounds.    Pulmonary:      Effort: Pulmonary effort is normal.      Breath sounds: Normal breath sounds. Abdominal:      General: Bowel sounds are normal. There is distension. Palpations: Abdomen is soft. There is no mass. Tenderness: There is no abdominal tenderness. There is no guarding. Skin:     General: Skin is warm and dry. Coloration: Skin is not jaundiced. Neurological:      Mental Status: He is alert and oriented to person, place, and time. Mental status is at baseline. LABORATORY DATA: Reviewed  Lab Results   Component Value Date    WBC 6.9 08/02/2022    HGB 7.3 (L) 08/02/2022    HCT 24.0 (L) 08/02/2022    MCV 87.7 08/02/2022     08/02/2022     (L) 08/02/2022    K 4.1 08/02/2022    CL 93 (L) 08/02/2022    CO2 22 08/02/2022    BUN 8 08/02/2022    CREATININE 0.78 08/02/2022    LABPROT 7.7 04/19/2012    LABALBU 2.9 (L) 08/02/2022    BILITOT 1.21 (H) 08/02/2022    ALKPHOS 137 (H) 08/02/2022    AST 30 08/02/2022    ALT 14 08/02/2022    INR 1.1 07/21/2022         Lab Results   Component Value Date    RBC 2.73 (L) 08/02/2022    HGB 7.3 (L) 08/02/2022    MCV 87.7 08/02/2022    MCH 26.6 08/02/2022    MCHC 30.3 (L) 08/02/2022    RDW 15.9 (H) 08/02/2022    MPV 7.4 08/02/2022    BASOPCT 1 08/02/2022    LYMPHSABS 0.70 (L) 08/02/2022    MONOSABS 1.10 08/02/2022    NEUTROABS 5.00 08/02/2022    EOSABS 0.10 08/02/2022    BASOSABS 0.00 08/02/2022         DIAGNOSTIC TESTING:     IR US GUIDED PARACENTESIS    Result Date: 7/21/2022  PROCEDURE: PARACENTESIS WITHOUT IMAGE GUIDANCE US ABDOMEN LIMITED 7/21/2022 HISTORY: ORDERING SYSTEM PROVIDED HISTORY: Ascites due to alcoholic cirrhosis [facility]) TECHNOLOGIST PROVIDED HISTORY: Ascites TECHNIQUE: Informed consent was obtained after a detailed explanation of the procedure including risks, benefits, and alternatives. Universal protocol was followed. A limited ultrasound of the abdomen was performed. The right abdomen was prepped and draped in sterile fashion and local anesthesia was achieved with lidocaine.   A 5 Vietnamese needle sheath was advanced into ascites and paracentesis was performed. The patient tolerated the procedure well. Albumin 100 g IV was administered during and post procedure. FINDINGS: Limited ultrasound of the abdomen demonstrates ascites. A total of 13.7 L was removed. Fluid was straw-colored and cloudy. Successful therapeutic paracentesis. CT CHEST ABDOMEN PELVIS W CONTRAST    Result Date: 7/25/2022  EXAMINATION: CT OF THE CHEST, ABDOMEN, AND PELVIS WITH CONTRAST 7/25/2022 2:50 pm TECHNIQUE: CT of the chest, abdomen and pelvis was performed with the administration of intravenous contrast. Multiplanar reformatted images are provided for review. Automated exposure control, iterative reconstruction, and/or weight based adjustment of the mA/kV was utilized to reduce the radiation dose to as low as reasonably achievable. COMPARISON: 04/25/2022 HISTORY: ORDERING SYSTEM PROVIDED HISTORY: Esophageal adenocarcinoma ([facility] TECHNOLOGIST PROVIDED HISTORY: STAT Creatinine as needed:->No restaging Reason for Exam: restaging, Esophageal adenocarcinoma FINDINGS: Chest: Mediastinum: Some thickening of the distal esophagus again noted along with sliding hiatal hernia similar to prior. Periesophageal varices evident in the distal esophagus. Similar to prior. Right-sided infusion port in place. No significant lymphadenopathy. Atherosclerotic disease in aorta and coronary arteries. Lungs/pleura: Mild paraseptal emphysema unchanged. No significant lung nodule or mass. No pleural effusion or pneumothorax. No focal consolidation. Soft Tissues/Bones: Right-sided infusion port in place terminating near cavoatrial junction. No aggressive lytic or blastic lesion. Abdomen/Pelvis: Organs: Nodular shrunken cirrhotic liver unchanged. No focal lesions. Cholelithiasis. Spleen, pancreas, kidneys and adrenal glands show no significant abnormalities. GI/Bowel: Hiatal hernia otherwise stomach unremarkable.   Small large bowel loops show no acute process. Normal appendix. Pelvis: Urinary bladder unremarkable. No suspicious pelvic mass. Peritoneum/Retroperitoneum: Large volume ascites similar to prior. No significant lymphadenopathy. Redemonstration of peripherally calcified thrombosed 2 cm splenic artery aneurysm. Small upper abdominal varices again noted. Bones/Soft Tissues: Diffuse degenerative changes. No aggressive lytic or blastic lesions. 1. No evidence for metastatic disease. 2. No significant lymphadenopathy. 3. Redemonstration of small hiatal hernia and some distal esophageal wall thickening. 4. Large volume ascites similar to prior. Small paraesophageal and gastrohepatic varices stable. Assessment  1. Ascites due to alcoholic cirrhosis ([address]    2. Alcoholic cirrhosis, unspecified whether ascites present ([address]    3. Lezama's esophagus with high grade dysplasia    4. Hep C w/o coma, chronic (HCC)        Plan    Will increase midodrine 10 mg TID given hyponatremia, hypotension. Continue diuretics  Plan for repeat paracentesis with albumin to follow  Repeat CBC, CMP next few days then weekly. Follow-up in office next 2-3 weeks     Thank you for allowing me to participate in the care of Mr. Lisset Ortiz. For any further questions please do not hesitate to contact me. I have reviewed and agree with the ROS entered by the MA/LPN. Note is dictated utilizing voice recognition software. Unfortunately this leads to occasional typographical errors. Please contact our office if you have any questions    Discussed with the GI APRN regarding this patient. His sodium levels around 125.   This needs to be followed closely and if he continues to drop sodium, may need to decrease or stop spironolactone    Will repeat labs in the next couple of days and decide regarding spironolactone    Calos Davison MD,FACP, [facility]  Board Certified in Gastroenterology and [address] [place] [address] Gastroenterology  Office #: [phone]

## 2022-08-05 ENCOUNTER — HOSPITAL ENCOUNTER (OUTPATIENT)
Dept: INTERVENTIONAL RADIOLOGY/VASCULAR | Age: 62
Discharge: HOME | End: 2022-08-07
Payer: COMMERCIAL

## 2022-08-05 VITALS
TEMPERATURE: 98.8 F | HEIGHT: 70 IN | BODY MASS INDEX: 28.63 KG/M2 | WEIGHT: 200 LBS | OXYGEN SATURATION: 100 % | DIASTOLIC BLOOD PRESSURE: 55 MMHG | HEART RATE: 98 BPM | RESPIRATION RATE: 16 BRPM | SYSTOLIC BLOOD PRESSURE: 114 MMHG

## 2022-08-05 DIAGNOSIS — K70.31 ALCOHOLIC CIRRHOSIS OF LIVER WITH ASCITES (HCC): ICD-10-CM

## 2022-08-05 PROCEDURE — 7100000011 HC PHASE II RECOVERY - ADDTL 15 MIN

## 2022-08-05 PROCEDURE — 6360000002 HC RX W HCPCS: Performed by: RADIOLOGY

## 2022-08-05 PROCEDURE — 7100000010 HC PHASE II RECOVERY - FIRST 15 MIN

## 2022-08-05 PROCEDURE — 2709999900 IR US GUIDED PARACENTESIS

## 2022-08-05 PROCEDURE — 49083 ABD PARACENTESIS W/IMAGING: CPT

## 2022-08-05 PROCEDURE — P9047 ALBUMIN (HUMAN), 25%, 50ML: HCPCS | Performed by: RADIOLOGY

## 2022-08-05 RX ORDER — HEPARIN SODIUM (PORCINE) LOCK FLUSH IV SOLN 100 UNIT/ML 100 UNIT/ML
500 SOLUTION INTRAVENOUS ONCE
Status: DISCONTINUED | OUTPATIENT
Start: 2022-08-05 | End: 2022-08-08 | Stop reason: HOSPADM

## 2022-08-05 RX ORDER — ALBUMIN (HUMAN) 12.5 G/50ML
100 SOLUTION INTRAVENOUS ONCE
Status: COMPLETED | OUTPATIENT
Start: 2022-08-05 | End: 2022-08-05

## 2022-08-05 RX ORDER — ACETAMINOPHEN 325 MG/1
650 TABLET ORAL EVERY 4 HOURS PRN
Status: DISCONTINUED | OUTPATIENT
Start: 2022-08-05 | End: 2022-08-08 | Stop reason: HOSPADM

## 2022-08-05 RX ADMIN — ALBUMIN (HUMAN) 100 G: 0.25 INJECTION, SOLUTION INTRAVENOUS at 13:46

## 2022-08-05 ASSESSMENT — PAIN - FUNCTIONAL ASSESSMENT: PAIN_FUNCTIONAL_ASSESSMENT: NONE - DENIES PAIN

## 2022-08-05 NOTE — PROGRESS NOTES
Patient tolerated paracentesis without distress. 11,000 ml of clear yellow fluid removed. dressing to site. Patient returned to room. Nurse updated.

## 2022-08-05 NOTE — H&P
HISTORY and [address]       NAME:  Nilson Ramírez  MRN: [id number]   YOB: 1960   Date: 8/5/2022   Age: 57 y.o. Gender: male     H&P Update Note    H&P from 7/21/2022 reviewed and updated. Patient examined. INTERVAL HISTORY:     Patient is feeling well today, denies any fever/chills, chest pain, but he has SOB due to the ascites  No interval changes. Pt has the last paracentesis on 7/21/22 with A total of 13.7 L was removed. Fluid was straw-colored and cloudy. Patient was given supplement IV albumin. Patient reports significant relief of symptoms. Pt states he has no pain but he feel discomfort. He complain of SOB with walking or any activity around the house improved by sitting in chair, worse when lying flat. Patient states he has easy bruising but no bleeding   Patient denies swelling in the lower legs bilaterally. No other concerns today, no recent fever/chills, no chest pain, nausea /vomiting, diarrhea or constipation. NPO status: Pt NPO since the past midnight  Medications taken TODAY (with sip of water): pt took all his am medication today with sip of water     Denies personal hx of blood clots. Denies personal hx of MRSA infection. Denies any personal or family hx of previous complications w/anesthesia. No interval changes to past medical history, social history, family history. Review of systems as stated above and otherwise negative. PHYSICAL EXAM:     Vitals: /65   Pulse 94   Temp 97.1 °F (36.2 °C) (Infrared)   Resp 20   Ht 5' 10\" (1.778 m)   Wt 200 lb (90.7 kg)   SpO2 99%   BMI 28.70 kg/m²  Body mass index is 28.7 kg/m². Patient is alert and oriented, in no distress. Heart rate and rhythm are regular. Lungs clear to auscultation bilaterally. Abdomen is soft, non tender. No pedal edema. Physical exam remains unchanged   No interval changes. I concur with the findings.      Nigel Lucero, MASSIMO - CNP on 8/5/2022 at 12:24 PM HISTORY and [address]         NAME:  Shauna León  MRN: [id number]   YOB: 1960   Date: 7/21/2022   Age: 57 y.o. Gender: male         COMPLAINT AND PRESENT HISTORY:   Shauna León is 57 y.o.,  male, here today for paracentesis. Patient is receiving treatments paracentesis     Patient has history of allochroic Liver cirrhosis with ascites,  Hep C  And he receiving paracentesis treatments once every month. Last paracentesis on 6/28/22 with A total of 10.8 L of clear yellow fluid was removed Patient was given supplement IV albumin. Patient reports significant relief of symptoms. Pt states he has not pain but he feel discomfort. He complain of SOB improved by sitting in chair, worse when lying flat. Patient states he has easy bleeding/bruising. Patient denies swelling in the lower legs bilaterally. No other concerns today, no recent fever/chills, no chest pain, nausea /vomiting, diarrhea or constipation. Review of additional significant medical hx:  HTN, [place], right chest port        NPO status: Pt NPO since the past midnight  Medications taken TODAY (with sip of water): none  Denies personal hx of blood clots. Denies personal hx of MRSA infection. Denies any personal or family hx of previous complications w/anesthesia.      PAST MEDICAL HISTORY      Past Medical History        Past Medical History:   Diagnosis Date    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 ([address]      Anxiety      Arthritis      Back pain, chronic       dr. Shellie Borjas, orthopedic, every 3-4 months, gets steroid injection    Lezama esophagus      BPH (benign prostatic hypertrophy)      Cholelithiasis      Cirrhosis ([address]      COVID-19 12/2020     pt reports he had a positive test while at [facility] in 2020, was asymptomatic    COVID-19 vaccine series completed 5/20/2021, 6/22/2021     Moderna 5/20/2021, 6/22/2021    DDD (degenerative disc disease), lumbar      Depression      Esophageal cancer ([address] INVASIVE ADENOCARCINOMA ARISING IN TUBULAR ADENOMA WITH HIGH GRADE DYSPLASIA, ASSOCIATED WITH FOCAL INTESTINAL METAPLASIA    Esophageal varices (HCC)      Fatty liver      GERD (gastroesophageal reflux disease)      Hep C w/o coma, chronic ([address]      History of alcohol abuse       6-12 beers a day; quit drinking July 2016    History of blood transfusion      History of colon polyps 2016    History of tobacco abuse      [place] (hard of hearing)      Hyperlipidemia      Hypertension      Port-A-Cath in place       right upper chest    Sciatica      Spinal stenosis      Stomach ulcer       hx of    Tubular adenoma of colon 2016, 2018    Vitamin D deficiency      Wears glasses              SURGICAL HISTORY        Past Surgical History         Past Surgical History:   Procedure Laterality Date    BUNIONECTOMY         twice on right side    BUNIONECTOMY Left      CARPAL TUNNEL RELEASE Right      COLONOSCOPY         at age 35    COLONOSCOPY   10/05/2016     polyps-pathology tubular adenoma, and abnormal looking mucosa right colon-pathology-tubular adenoma    COLONOSCOPY N/A 3/30/2018     COLONOSCOPY POLYPECTOMY COLD BIOPSY performed by Darien Plascencia MD at [facility]   03/30/2018     Small polyp in the sigmoid colon and excised with biopsy forceps--tubular adenoma    COLONOSCOPY N/A 4/16/2022     COLONOSCOPY POLYPECTOMY performed by Darien Plascencia MD at [facility], DIAGNOSTIC         EGD    IR PORT PLACEMENT EQUAL OR GREATER THAN 5 YEARS   4/19/2021     IR PORT PLACEMENT EQUAL OR GREATER THAN 5 YEARS 4/19/2021 [facility] SPECIAL PROCEDURES    KNEE SURGERY Left       cyst removed    NASAL SEPTUM SURGERY        NERVE BLOCK Right 11/23/2020     NERVE BLOCK RIGHT CERVICAL STEROID INJECTION  C3-C6 performed by Lindbergh Dakin, MD at [address]   01/04/16     steroid injection C7 T1    OTHER SURGICAL HISTORY   11/21/2016     Bilateral Lumbar CACHORRO L4-L5 injections    OTHER SURGICAL HISTORY   12/19/2016     lumbar steroid injection    OTHER SURGICAL HISTORY   09/28/2018     BILATERAL L5 CACHORRO (N/A Back)    OTHER SURGICAL HISTORY Right 11/23/2020     cervical injection    PAIN MANAGEMENT PROCEDURE Left 7/9/2020     EPIDURAL STEROID INJECTION LEFT L4 L5 performed by Angie Nation MD at [facility] Left 7/20/2020     LEFT L4 L5 EPIDURAL STEROID INJECTION performed by Angie Nation MD at [facility] Bilateral 8/17/2020     LUMBAR FACET BILATERAL L2-L5 performed by Angie Nation MD at [facility] Bilateral 12/7/2020     NERVE BLOCK BILATERAL LUMBAR MEDIAL BRANCH L2-L5 performed by Angie Nation MD at [facility] AA&/STRD TFRML EPI LUMBAR/SACRAL 1 LEVEL Bilateral 9/6/2018     BILATERAL L5 CACHORRO performed by Angie Nation MD at [facility] AA&/STRD TFRML EPI LUMBAR/SACRAL 1 LEVEL N/A 9/28/2018     BILATERAL L5 CACHORRO performed by Angie Nation MD at [address] [place] [address] N/CARPAL TUNNEL SURG Right 8/29/2017     CARPAL TUNNEL RELEASE RIGHT performed by Diego Christine MD at [address] [place] [address] N/CARPAL TUNNEL SURG Left 10/31/2017     CARPAL TUNNEL RELEASE performed by Diego Christine MD at [address] [place] [address] 12/29/2020     EGD BIOPSY performed by Macario Faulkner MD at [address] [place] [address] N/A 2/2/2021     EGD BIOPSY and spot marking performed by Jannet Savage MD at [address] [place] [address] N/A 2/12/2021     ENDOSCOPIC ULTRASOUND, EGD performed by Victor Manuel Kimble MD at [place] [address]   2/12/2021     EGD DIAGNOSTIC ONLY performed by Victor Manuel Kimble MD at [place] [address] 8/31/2021     EGD BIOPSY performed by Jannet Savage MD at [address] [place] [address] 1/21/2022     EGD BIOPSY performed by Jannet Savage MD Medication Sig Dispense Refill    furosemide (LASIX) 20 MG tablet Take 1 tablet by mouth 2 times daily 60 tablet 3    spironolactone (ALDACTONE) 100 MG tablet Take 1 tablet by mouth every evening (Patient taking differently: Take 100 mg by mouth every evening Take 1 tablet at 100mg every other day, Other days take 1/2 tablet) 30 tablet 1    pantoprazole (PROTONIX) 40 MG tablet Take 1 tablet by mouth 2 times daily for 14 days 28 tablet 0    sucralfate (CARAFATE) 1 GM tablet Take 1 tablet by mouth 4 times daily for 14 days 56 tablet 0    vitamin D (CHOLECALCIFEROL) 25 MCG (1000 UT) TABS tablet Take 1,000 Units by mouth daily  (Patient not taking: Reported on 7/21/2022)        ondansetron (ZOFRAN-ODT) 4 MG disintegrating tablet dissolve 1 tablet by mouth every 8 hours if needed for nausea and vomiting 10 tablet 0    lactulose (CHRONULAC) 10 GM/15ML solution take 30 milliliters by mouth three times a day 473 mL 1    nadolol (CORGARD) 20 MG tablet take 1 tablet by mouth once daily        FLUoxetine (PROZAC) 20 MG capsule Take 1 capsule by mouth daily 30 capsule 3    atorvastatin (LIPITOR) 20 MG tablet Take 1 tablet by mouth nightly 30 tablet 3    midodrine (PROAMATINE) 5 MG tablet Take 1 tablet by mouth 3 times daily as needed (SBP <110) 90 tablet 3    ferrous sulfate (IRON 325) 325 (65 Fe) MG tablet Take 1 tablet by mouth 2 times daily 60 tablet 5      No current facility-administered medications on file prior to encounter. Review of Systems  Constitutional:  Positive for unexpected weight change. Pt lost weight for about 60 pound over the last year. HENT:  Positive for hearing loss. Eyes:  Positive for visual disturbance. Eye glasses    Respiratory:  Positive for shortness of breath. Cardiovascular: Negative. Negative for chest pain, palpitations and leg swelling. Gastrointestinal:  Positive for abdominal distention. Ascites    Genitourinary: Negative.     Musculoskeletal: normal.  Psychiatric:         Mood and Affect: Mood normal.         Behavior: Behavior normal.                PROVISIONAL DIAGNOSES / SURGERY:       IR US guided paracentesis           Patient Active Problem List     Diagnosis Date Noted    Secondary esophageal varices ([address] 06/07/2022    Esophageal polyp 06/07/2022    Drop in hemoglobin 06/03/2022    Portal hypertension (HCC)      Shortness of breath      Ascites 04/26/2022    Acute kidney failure, unspecified ([address] 04/21/2022    Muscle weakness (generalized) 07/28/2016    Other abnormalities of gait and mobility 07/28/2016    Anemia 04/13/2022    Acute kidney injury ([address] 04/13/2022    Esophageal adenocarcinoma ([address]      Low hemoglobin 12/20/2021    Symptomatic anemia, microcytic, acute 12/20/2021    Hypotension 12/20/2021    Former smoker, 50+ pack years, quit 2016 12/20/2021    HLD (hyperlipidemia) 12/20/2021    Abnormal findings on diagnostic imaging of spine 12/14/2021    Cervical spinal stenosis 12/14/2021    Spinal stenosis of lumbar region with neurogenic claudication 12/14/2021    Severe comorbid illness 11/30/2021    Gait instability 11/30/2021    Current smoker 04/05/2021    COVID-19 02/23/2021    Anxiety 02/23/2021    Malignant neoplasm of lower third of esophagus ([address]      Hypocalcemia 12/26/2020    Hypophosphatemia 12/26/2020    Gastrointestinal hemorrhage with melena      Alcohol abuse      Altered mental status      Acute gastrointestinal bleeding 12/23/2020    Thrombocytopenia ([address] 12/23/2020    Hepatitis C virus infection resolved after antiviral drug therapy 12/23/2020    Cervical facet syndrome 11/23/2020    Lumbar facet arthropathy 08/17/2020    Elevated LFTs 08/12/2020    Seasonal allergies 08/12/2020    S/P epidural steroid injection 08/05/2020    Essential hypertension 04/24/2019    Recurrent major depressive disorder in partial remission ([address] 04/24/2019    Pure hypercholesterolemia 02/04/2019    Hypokalemia 02/04/2019    Vitamin D deficiency 09/20/2017    History of hepatitis C 09/11/2017    Ganglion cyst 05/31/2017    Carpal tunnel syndrome of right wrist 05/31/2017    Tinnitus 03/23/2017    Eustachian tube dysfunction 03/23/2017    Lumbar radiculitis 11/08/2016    Lumbar disc herniation 11/08/2016    Gynecomastia, male 10/26/2016    Depression 10/13/2016    Vertebrogenic low back pain 10/06/2016    DDD (degenerative disc disease), lumbar 10/06/2016    Hepatic cirrhosis ([address] 09/15/2016    Psychophysiologic insomnia 09/14/2016    Cirrhosis ([address]      Hep C w/o coma, chronic (HCC)      Fatty liver      Calculus of gallbladder without cholecystitis 08/10/2016    Chronic viral hepatitis B without delta agent and without coma ([address] 07/22/2016    Hypomagnesemia      Alcohol withdrawal syndrome without complication ([address] 69/98/6456    Cervical radicular pain 01/04/2016    Tubular adenoma of colon 01/01/2016    History of colon polyps 01/01/2016    Back pain, chronic 04/19/2012    Hearing difficulty 04/19/2012    GERD (gastroesophageal reflux disease) 04/19/2012            MASSIMO Hadley - CNP on 7/21/2022 at 10:16 AM

## 2022-08-05 NOTE — OP NOTE
Brief Postoperative Note    Shanda Villa  YOB: 1960  [id number]    Pre-operative Diagnosis: ascites    Post-operative Diagnosis: Same    Procedure: paracentesis    Anesthesia: Local   Surgeons/Assistants: Kristian Garcia MD     Estimated Blood Loss: minimal    Complications: none immediate    Specimens: were obtained      Electronically signed by Kristian Garcia MD on 8/5/2022 at 1:41 PM

## 2022-08-05 NOTE — DISCHARGE INSTRUCTIONS
DISCHARGE INSTRUCTIONS FOR PARACENTESIS    In order to continue your care at home, please follow the instructions below. Medications    Use over-the-counter pain medication as directed on bottle for pain control    Post Procedure Site/Care/Activity  For up to 2 days after the procedure, you may have a small amount of clear fluid coming out of the site where the needle was inserted, especially if you had a lot of fluid removed. You may need to change the bandage on the site. Do not lie totally flat until morning. You can do your normal activities after the procedure, unless instructed differently. Call your doctor or seek immediate medical care if:   You have symptoms of infection, such as increased pain, swelling, warmth, or redness, red streaks or pus. An oral temperature (by mouth) is 101 degrees or higher (fever), chills, fever. You are dizzy or lightheaded, or you feel like you may faint. You have new or worse belly pain. Watch closely for changes in your health, and be sure to contact your doctor if:   Fluid builds up in your belly again. You do not get better as expected.       IF YOU CANNOT REACH THE DOCTOR, GO TO THE NEAREST EMERGENCY ROOM OR CALL 911    Phone: Interventional Radiology   [phone]  After hours Radiology   [phone]   Dr Vibha Sun performed your procedure  11L were removed

## 2022-08-08 ENCOUNTER — TELEPHONE (OUTPATIENT)
Dept: GASTROENTEROLOGY | Age: 62
End: 2022-08-08

## 2022-08-08 ENCOUNTER — TELEPHONE (OUTPATIENT)
Dept: INTERVENTIONAL RADIOLOGY/VASCULAR | Age: 62
End: 2022-08-08

## 2022-08-08 DIAGNOSIS — K70.31 ALCOHOLIC CIRRHOSIS OF LIVER WITH ASCITES (HCC): Primary | ICD-10-CM

## 2022-08-09 ENCOUNTER — TELEPHONE (OUTPATIENT)
Dept: ONCOLOGY | Age: 62
End: 2022-08-09

## 2022-08-09 ENCOUNTER — OFFICE VISIT (OUTPATIENT)
Dept: ONCOLOGY | Age: 62
End: 2022-08-09
Payer: MEDICARE

## 2022-08-09 ENCOUNTER — TELEPHONE (OUTPATIENT)
Dept: GASTROENTEROLOGY | Age: 62
End: 2022-08-09

## 2022-08-09 ENCOUNTER — TELEPHONE (OUTPATIENT)
Dept: INTERVENTIONAL RADIOLOGY/VASCULAR | Age: 62
End: 2022-08-09

## 2022-08-09 VITALS
TEMPERATURE: 97.2 F | HEART RATE: 71 BPM | WEIGHT: 188 LBS | BODY MASS INDEX: 26.98 KG/M2 | DIASTOLIC BLOOD PRESSURE: 71 MMHG | SYSTOLIC BLOOD PRESSURE: 116 MMHG

## 2022-08-09 DIAGNOSIS — C15.5 MALIGNANT NEOPLASM OF LOWER THIRD OF ESOPHAGUS (HCC): Primary | ICD-10-CM

## 2022-08-09 DIAGNOSIS — K70.31 ASCITES DUE TO ALCOHOLIC CIRRHOSIS (HCC): Primary | ICD-10-CM

## 2022-08-09 PROCEDURE — 99211 OFF/OP EST MAY X REQ PHY/QHP: CPT | Performed by: INTERNAL MEDICINE

## 2022-08-09 PROCEDURE — G8417 CALC BMI ABV UP PARAM F/U: HCPCS | Performed by: INTERNAL MEDICINE

## 2022-08-09 PROCEDURE — 99214 OFFICE O/P EST MOD 30 MIN: CPT | Performed by: INTERNAL MEDICINE

## 2022-08-09 PROCEDURE — 3017F COLORECTAL CA SCREEN DOC REV: CPT | Performed by: INTERNAL MEDICINE

## 2022-08-09 PROCEDURE — G8427 DOCREV CUR MEDS BY ELIG CLIN: HCPCS | Performed by: INTERNAL MEDICINE

## 2022-08-09 PROCEDURE — 1036F TOBACCO NON-USER: CPT | Performed by: INTERNAL MEDICINE

## 2022-08-09 NOTE — PROGRESS NOTES
Brief Pulmonary Note:     62F with hx of Marfan Syndrome with aortic dissection repair s/p AVR and MVR, orthotopic heart transplant on tacrolimus (10/2012) complicated by acute cellular rejection and invasive aspergillosis, who is admitted with acute hypoxic respiratory failure, with CT-chest showing bilateral pleural effusions. Chest tube placed yesterday for right sided pleural effusion. Pleural analysis consistent with an exudative effusion. Cell count showed predominantly PMNs.  Gram stain shows no organisms and cultures and cytology are pending.  1.47 L out yesterday and 380 mL's so far today.  Patient feels markedly better and has been weaned down to nasal cannula from BIPAP.     Recommendations:  -Please keep chest tube to -20 cm suction and only take off suction if 1L output is reached  -Follow-up pleural cultures and cytology  -Once drainage is less than 250 cc/day for two consecutive days then chest tube can be pulled    Pulmonary follow along with you, please call with questions.    Tam De Santiago  Pulmonary Critical Care fellow  [phone]   Patient ID: Frankie Haynes, [birth year], [id number], 57 y.o. Referred by : Dr Kvng Ortiz  Reason for consultation:   Esophageal cancer T2N0Mx  Stage II   started on concurrent chemoradiation preoperatively on 5/4/21  Chemoradiation completed 6/9/21  Patient had a PET scan on 7/23/2021 which showed no evidence of recurrence  Patient has been evaluated by thoracic surgeon at [facility] Dr. Raman Cano and also hepatologist Dr. Kieran Habermann his case was discussed at thoracic tumor oncology board with recommendations NOT to do any surgery because of his liver failure. So surveillance recommended  He had an upper endoscopy on 8/31 which showed no residual cancer but showed Lezama's esophagus with high-grade dysplasia. HISTORY OF PRESENT ILLNESS:    Oncologic History:  Frankie Haynes is a 57 y.o. male with a history of hepatitis C, status post treatment, liver cirrhosis, history of alcohol abuse was seen during initial consultation visit for newly diagnosed esophageal cancer. Patient reportedly had \"heavy drinking alcohol in about 2016 however recently in December he had 2 beers and he presented with hematemesis. On 12/29/2020 he had upper endoscopy which showed severe portal hypertension, gastropathy. The biopsy from the GE junction showed invasive adenocarcinoma. Patient had a follow-up EGD on 2/2/2021 which confirmed esophageal adenocarcinoma. Patient had endoscopic ultrasound on 2/12/2021 which showed T2 N0 MX disease with underlying esophageal varices. In the esophagus hypoechoic lesion noted in the lower esophagus penetrating into submucosa and into muscularis propria. No lymphadenopathy noted. Patient was referred to medical oncology for further recommendations. He denies any difficulty swallowing. He does not smoke he has quit smoking in 2016. He has not drank alcohol since December. He has a history of hepatitis C and he has received treatment. He lives by himself.   He is TUNNEL RELEASE Right     COLONOSCOPY      at age 35    COLONOSCOPY  10/05/2016    polyps-pathology tubular adenoma, and abnormal looking mucosa right colon-pathology-tubular adenoma    COLONOSCOPY N/A 3/30/2018    COLONOSCOPY POLYPECTOMY COLD BIOPSY performed by [address], MD at [address] [facility] [address],[facility] [address]  03/30/2018    Small polyp in the sigmoid colon and excised with biopsy forceps--tubular adenoma    COLONOSCOPY N/A 4/16/2022    COLONOSCOPY POLYPECTOMY performed by [address], MD at [address], DIAGNOSTIC      EGD    IR PORT PLACEMENT EQUAL OR GREATER THAN 5 YEARS  4/19/2021    IR PORT PLACEMENT EQUAL OR GREATER THAN 5 YEARS 4/19/2021 [facility] SPECIAL PROCEDURES    KNEE SURGERY Left     cyst removed    NASAL SEPTUM SURGERY      NERVE BLOCK Right 11/23/2020    NERVE BLOCK RIGHT CERVICAL STEROID INJECTION  C3-C6 performed by Rich Nichols MD at [address] [address]  01/04/16    steroid injection C7 T1    OTHER SURGICAL HISTORY  11/21/2016    Bilateral Lumbar CACHORRO L4-L5 injections    OTHER SURGICAL HISTORY  12/19/2016    lumbar steroid injection    OTHER SURGICAL HISTORY  09/28/2018    BILATERAL L5 CACHORRO (N/A Back)    OTHER SURGICAL HISTORY Right 11/23/2020    cervical injection    PAIN MANAGEMENT PROCEDURE Left 7/9/2020    EPIDURAL STEROID INJECTION LEFT L4 L5 performed by Rich Nichols MD at [address] Left 7/20/2020    LEFT L4 L5 EPIDURAL STEROID INJECTION performed by Rich Nichols MD at [address] Bilateral 8/17/2020    LUMBAR FACET BILATERAL L2-L5 performed by Rich Nichols MD at [address] Bilateral 12/7/2020    NERVE BLOCK BILATERAL LUMBAR MEDIAL BRANCH L2-L5 performed by Rich Nichols MD at [place] [facility] EPI LUMBAR/SACRAL 1 LEVEL Bilateral 9/6/2018    BILATERAL L5 CACHORRO performed by Rich Nichols MD at [facility] EPI LUMBAR/SACRAL 1 LEVEL N/A 9/28/2018    BILATERAL L5 CACHORRO performed by Sharon Bermudez MD at [address] [facility] Right 8/29/2017    CARPAL TUNNEL RELEASE RIGHT [facility] 10/31/2017    CARPAL TUNNEL RELEASE performed by Maddie Wilson MD at [address] [place] [address] 12/29/2020    EGD BIOPSY performed by Ben Adamson MD at [address] [place] [address] 2/2/2021    EGD BIOPSY and spot marking performed by Itzel Rodriguez MD at [address] [place] [address] N/A 2/12/2021    ENDOSCOPIC ULTRASOUND, EGD performed by Angelo Burks MD at [place] [address]  2/12/2021    EGD DIAGNOSTIC ONLY performed by Angelo Burks MD at [place] [address] 8/31/2021    EGD BIOPSY performed by Itzel Rodriguez MD at [address] [place] [address] 1/21/2022    EGD BIOPSY performed by Itzel Rodriguez MD at [address] [place] [address] 4/15/2022    EGD ESOPHAGOGASTRODUODENOSCOPY performed by Ben Adamson MD at [address] [place] [address] 6/6/2022    EGD BAND LIGATION performed by Lubna Hernandez MD at [address] [place] [address] 6/9/2022    EGD ESOPHAGOGASTRODUODENOSCOPY performed by Lubna Hernandez MD at [address] [place] [address]       No Known Allergies      Current Outpatient Medications   Medication Sig Dispense Refill    midodrine (PROAMATINE) 5 MG tablet Take 2 tablets by mouth in the morning and 2 tablets at noon and 2 tablets before bedtime.  90 tablet 3    furosemide (LASIX) 20 MG tablet Take 1 tablet by mouth 2 times daily 60 tablet 3    spironolactone (ALDACTONE) 100 MG tablet Take 1 tablet by mouth every evening (Patient taking differently: Take 100 mg by mouth every evening Take 1 tablet at 100mg every other day, Other days take 1/2 tablet) 30 tablet 1    ondansetron (ZOFRAN-ODT) 4 MG disintegrating tablet dissolve 1 tablet by mouth every 8 hours if needed for nausea and vomiting 10 tablet 0    lactulose (CHRONULAC) 10 GM/15ML solution take 30 milliliters by mouth three times a day 473 mL 1    nadolol (CORGARD) 20 MG tablet take 1 tablet by mouth once daily      FLUoxetine (PROZAC) 20 MG capsule Take 1 capsule by mouth daily 30 capsule 3    atorvastatin (LIPITOR) 20 MG tablet Take 1 tablet by mouth nightly 30 tablet 3    ferrous sulfate (IRON 325) 325 (65 Fe) MG tablet Take 1 tablet by mouth 2 times daily 60 tablet 5    pantoprazole (PROTONIX) 40 MG tablet Take 1 tablet by mouth 2 times daily for 14 days 28 tablet 0    sucralfate (CARAFATE) 1 GM tablet Take 1 tablet by mouth 4 times daily for 14 days 56 tablet 0    vitamin D (CHOLECALCIFEROL) 25 MCG (1000 UT) TABS tablet Take 1,000 Units by mouth daily  (Patient not taking: No sig reported)       No current facility-administered medications for this visit.        Social History     Socioeconomic History    Marital status: Single     Spouse name: Not on file    Number of children: Not on file    Years of education: Not on file    Highest education level: Not on file   Occupational History    Not on file   Tobacco Use    Smoking status: Former     Packs/day: 1.00     Years: 45.00     Pack years: 45.00     Types: Cigarettes     Quit date: 2017     Years since quittin.5    Smokeless tobacco: Never   Vaping Use    Vaping Use: Never used   Substance and Sexual Activity    Alcohol use: Not Currently     Comment: quit     Drug use: Not Currently     Frequency: 1.0 times per week     Comment: cocaine,  stopped spring 2016    Sexual activity: Yes     Partners: Female   Other Topics Concern    Not on file   Social History Narrative     in the past, retired     Social Determinants of Health     Financial Resource Strain: Low Risk     Difficulty of Paying Living Expenses: Not hard at all   Food Insecurity: No Food Insecurity    Worried About Running Out of Food in the Last Year: Never true    Ran Out of Food in the Last Year: Never true   Transportation Needs: Not on file   Physical Activity: Inactive    Days of Exercise per Week: 0 days    Minutes of Exercise per Session: 0 min   Stress: Not on file   Social Connections: Not on file   Intimate Partner Violence: Not on file   Housing Stability: Not on file       Family History   Problem Relation Age of Onset    Cancer Mother         pancreatic    Cancer Father         bone    Diabetes Sister     Asthma Brother         REVIEW OF SYSTEM:     Constitutional: No fever or chills. No night sweats, no weight loss   Eyes: No eye discharge, double vision, or eye pain   HEENT: negative for sore mouth, sore throat, hoarseness and voice change   Respiratory: negative for cough , sputum, dyspnea, wheezing, hemoptysis, chest pain   Cardiovascular: negative for chest pain, dyspnea, palpitations, orthopnea, PND   Gastrointestinal: negative for nausea, vomiting, diarrhea, constipation, abdominal pain, Dysphagia, hematemesis and hematochezia   Genitourinary: negative for frequency, dysuria, nocturia, urinary incontinence, and hematuria   Integument: negative for rash, skin lesions, bruises.    Hematologic/Lymphatic: negative for easy bruising, bleeding, lymphadenopathy, petechiae and swelling/edema   Endocrine: negative for heat or cold intolerance, tremor, weight changes, change in bowel habits and hair loss   Musculoskeletal: negative for myalgias, arthralgias, pain, joint swelling,and bone pain   Neurological: negative for headaches, dizziness, seizures, weakness, numbness       OBJECTIVE:         Vitals:    08/09/22 1356   BP: 116/71   Pulse: 71   Temp: 97.2 °F (36.2 °C)       PHYSICAL EXAM:   General appearance - well appearing, no in pain or distress   Mental status - alert and cooperative   Eyes - pupils equal and reactive, extraocular eye movements intact   Ears - bilateral TM's and external ear canals normal   Mouth - mucous membranes moist, pharynx normal without lesions   Neck - supple, no significant adenopathy   Lymphatics - no palpable lymphadenopathy, no hepatosplenomegaly   Chest - clear to auscultation, no wheezes, rales or rhonchi, symmetric air entry   Heart - normal rate, regular rhythm, normal S1, S2, no murmurs, rubs, clicks or gallops   Abdomen - soft, nontender, nondistended, no masses or organomegaly   Neurological - alert, oriented, normal speech, no focal findings or movement disorder noted   Musculoskeletal - no joint tenderness, deformity or swelling   Extremities - peripheral pulses normal, no pedal edema, no clubbing or cyanosis   Skin - normal coloration and turgor, no rashes, no suspicious skin lesions noted   LABORATORY DATA:     Lab Results   Component Value Date    WBC 6.9 08/02/2022    HGB 7.3 (L) 08/02/2022    HCT 24.0 (L) 08/02/2022    MCV 87.7 08/02/2022     08/02/2022    LYMPHOPCT 10 (L) 08/02/2022    RBC 2.73 (L) 08/02/2022    MCH 26.6 08/02/2022    MCHC 30.3 (L) 08/02/2022    RDW 15.9 (H) 08/02/2022    MONOPCT 16 (H) 08/02/2022    BASOPCT 1 08/02/2022    NEUTROABS 5.00 08/02/2022    LYMPHSABS 0.70 (L) 08/02/2022    MONOSABS 1.10 08/02/2022    EOSABS 0.10 08/02/2022    BASOSABS 0.00 08/02/2022       Chemistry        Component Value Date/Time     (L) 08/02/2022 1523    K 4.1 08/02/2022 1523    CL 93 (L) 08/02/2022 1523    CO2 22 08/02/2022 1523    BUN 8 08/02/2022 1523    CREATININE 0.78 08/02/2022 1523        Component Value Date/Time    CALCIUM 8.5 (L) 08/02/2022 1523    ALKPHOS 137 (H) 08/02/2022 1523    AST 30 08/02/2022 1523    ALT 14 08/02/2022 1523    BILITOT 1.21 (H) 08/02/2022 1523        PATHOLOGY DATA:   Surgical Pathology Report  Surgical Pathology  Collected: 12/29/20 1334   Lab status: Final   Resulting lab: [address] [facility]   Value: [id number]   [facility]. [address] [place], [address] [facility]   [phone]   Fax: [phone]   SURGICAL PATHOLOGY REPORT     Patient Name: Zenobia Vazquez   MR#: [id number]   Specimen #[id number]         Final Diagnosis   GASTROESOPHAGEAL JUNCTION, BIOPSY:          INVASIVE ADENOCARCINOMA    Final Diagnosis   ESOPHAGUS, LESION AT 34 CM, BIOPSY:          INVASIVE ADENOCARCINOMA ARISING IN TUBULAR ADENOMA WITH HIGH   GRADE DYSPLASIA, ASSOCIATED WITH FOCAL INTESTINAL METAPLASIA (SEE   COMMENT)     Diagnosis Comment   The malignancy diagnosis is confirmed by review of a second   pathologist.  The result of HER2 IHC with reflex to [address] [place] [address] for   gastroesophageal adenocarcinoma will be issued in an addendum. ADDENDUM (SCM)     Date Ordered:     2/16/2021     Status: Signed Out        Date Complete:     2/16/2021     By: Sohan Patterson M.D. Date Reported:     2/16/2021       ADDENDUM COMMENT   This addendum is issued in order to incorporate the result of HER2 by   [address] [place] [address], performed at [address] [place], [place] [address] ([id number], 02/16/2021). \"RESULTS:  INDETERMINATE     Interpretation:     Average HER2 signals/nucleus:  4.4   Average SUNG 17 signals/nucleus:  3.4   HER2/SUNG 17 signal ratio:  1.3   Number of Observers:  2     Even after analysis of additional cells and review of slides by a   pathologist, FISH results show a HER2/SUNG 17 ratio < 2.0 and an   average of 4-6 HER2 signals per nucleus and 3 or more SUNG 17 signals   per nucleus. The results are INDETERMINATE. In this situation, the 2016 CAP/ASCP/ASCO guidelines recommend   selecting a different tumor block for HER2 testing, if available. \"       Of note, there is only one block available for testing of invasive   esophageal adenocarcinoma tumor cells. It was already used for HER2   IHC and HER2 FISH testing with the results issued in the addendums. IMAGING DATA:    Reviewed  CT chest abdomen pelvis 10/20/2021  Impression   1.   Stable exam of the chest, abdomen and pelvis without evidence for   metastatic disease. 2.  The esophagus is decompressed. Mucosal enhancement in the distal   esophagus may be due to underlying varices. Underlying inflammation or mass   is considered less likely given the stability of this finding and recent   negative PET-CT. 3.  Morphologic findings of cirrhosis and portal hypertension again   demonstrated. No focal liver lesion identified. Trace abdominopelvic   ascites. Small varices. ASSESSMENT:    Joy Medel is a 57 y.o. male with history of hepatitis C, liver cirrhosis, history of alcohol abuse, with esophageal cancer. I reviewed the NCCN guidelines and goals of care. Clinically appears to have T2 N0 M0  Stag II, disease. Started on preoperative  concurrent chemoradiation  Now he has completed chemoradiation  A PET scan on 7/21/21 after chemoradiation showed no metabolic evidence of active neoplasm. Induced at [place] [facility] recommended surveillance. Every week    During today's visit, the patient and the family had a number of reasonable questions which were answered to their satisfaction. They verbalized understanding of the information provided and they agreed to proceed as outlined above. PLAN:   I reviewed his recent lab, discussed diagnosis, treatment recommendations   His CT scan showed no evidence of recurrence   Follow-up with gastroenterology for paracentesis as needed   Return to clinic in 6 weeks with labs prior    Garrison Fischer MD  Hematologist/Medical Oncologist    On this date 8/9/22  I have spent 40 minutes reviewing previous notes, test results and face to face with the patient discussing the diagnosis and importance of compliance with the treatment plan. Greater than 50% of that time was spent face-to-face with the patient in counseling and coordinating her care.         This note is created with the assistance of a speech recognition program.  While intending to generate a document that actually reflects the content of the visit, the document can still have some errors including those of syntax and sound a like substitutions which may escape proof reading. It such instances, actual meaning can be extrapolated by contextual diversion.

## 2022-08-09 NOTE — TELEPHONE ENCOUNTER
Pt called in stating could not get normal weekly labs done, writer checked pt chart labs where ordered as future instead of standing order, adv pt new orders have been placed and changed to standing order for weekly, pt thanked writer call ended

## 2022-08-10 ENCOUNTER — HOSPITAL ENCOUNTER (OUTPATIENT)
Dept: PHYSICAL THERAPY | Age: 62
Setting detail: THERAPIES SERIES
Discharge: HOME | End: 2022-08-10
Payer: MEDICARE

## 2022-08-10 NOTE — FLOWSHEET NOTE
[x] [facility]) - [facility] Therapy  [address]  [place]: [phone]   F: [phone]     Physical Therapy Cancel/No Show note    Date: 8/10/2022  Patient: Mina Tamez  :   MRN: [id number]    Visit Count:   Cancels/No Shows to date:     For today's appointment patient:    [x]  Cancelled    [] Rescheduled appointment    [] No-show     Reason given by patient:    []  Patient ill    []  Conflicting appointment    [] No transportation      [] Conflict with work    [] No reason given    [] Weather related    [] [id number]    [x] Other:      Comments: Can't make it today. Rescheduled.        [x] Next appointment was confirmed    Electronically signed by: Ravi Zheng PTA

## 2022-08-11 RX ORDER — SODIUM CHLORIDE 0.9 % (FLUSH) 0.9 %
5-40 SYRINGE (ML) INJECTION PRN
Status: CANCELLED | OUTPATIENT
Start: 2022-08-11

## 2022-08-11 RX ORDER — SODIUM CHLORIDE 9 MG/ML
INJECTION, SOLUTION INTRAVENOUS PRN
Status: CANCELLED | OUTPATIENT
Start: 2022-08-11

## 2022-08-11 RX ORDER — SODIUM CHLORIDE 0.9 % (FLUSH) 0.9 %
5-40 SYRINGE (ML) INJECTION EVERY 12 HOURS SCHEDULED
Status: CANCELLED | OUTPATIENT
Start: 2022-08-11

## 2022-08-12 ENCOUNTER — HOSPITAL ENCOUNTER (OUTPATIENT)
Dept: INTERVENTIONAL RADIOLOGY/VASCULAR | Age: 62
Discharge: HOME | End: 2022-08-14
Payer: MEDICARE

## 2022-08-12 VITALS
RESPIRATION RATE: 18 BRPM | OXYGEN SATURATION: 100 % | HEIGHT: 70 IN | DIASTOLIC BLOOD PRESSURE: 56 MMHG | HEART RATE: 69 BPM | WEIGHT: 195.3 LBS | BODY MASS INDEX: 27.96 KG/M2 | SYSTOLIC BLOOD PRESSURE: 93 MMHG | TEMPERATURE: 97.3 F

## 2022-08-12 DIAGNOSIS — K70.31 ALCOHOLIC CIRRHOSIS OF LIVER WITH ASCITES (HCC): ICD-10-CM

## 2022-08-12 PROCEDURE — 2580000003 HC RX 258: Performed by: RADIOLOGY

## 2022-08-12 PROCEDURE — 6360000002 HC RX W HCPCS: Performed by: RADIOLOGY

## 2022-08-12 PROCEDURE — 49083 ABD PARACENTESIS W/IMAGING: CPT

## 2022-08-12 PROCEDURE — 7100000011 HC PHASE II RECOVERY - ADDTL 15 MIN

## 2022-08-12 PROCEDURE — 2709999900 IR US GUIDED PARACENTESIS

## 2022-08-12 PROCEDURE — P9047 ALBUMIN (HUMAN), 25%, 50ML: HCPCS | Performed by: RADIOLOGY

## 2022-08-12 PROCEDURE — 7100000010 HC PHASE II RECOVERY - FIRST 15 MIN

## 2022-08-12 RX ORDER — ALBUMIN (HUMAN) 12.5 G/50ML
75 SOLUTION INTRAVENOUS ONCE
Status: COMPLETED | OUTPATIENT
Start: 2022-08-12 | End: 2022-08-12

## 2022-08-12 RX ORDER — HEPARIN SODIUM (PORCINE) LOCK FLUSH IV SOLN 100 UNIT/ML 100 UNIT/ML
500 SOLUTION INTRAVENOUS ONCE
Status: COMPLETED | OUTPATIENT
Start: 2022-08-12 | End: 2022-08-12

## 2022-08-12 RX ORDER — ACETAMINOPHEN 325 MG/1
650 TABLET ORAL EVERY 4 HOURS PRN
Status: DISCONTINUED | OUTPATIENT
Start: 2022-08-12 | End: 2022-08-15 | Stop reason: HOSPADM

## 2022-08-12 RX ORDER — SODIUM CHLORIDE 9 MG/ML
INJECTION, SOLUTION INTRAVENOUS PRN
Status: DISCONTINUED | OUTPATIENT
Start: 2022-08-12 | End: 2022-08-15 | Stop reason: HOSPADM

## 2022-08-12 RX ORDER — SODIUM CHLORIDE 0.9 % (FLUSH) 0.9 %
5-40 SYRINGE (ML) INJECTION PRN
Status: DISCONTINUED | OUTPATIENT
Start: 2022-08-12 | End: 2022-08-15 | Stop reason: HOSPADM

## 2022-08-12 RX ORDER — SODIUM CHLORIDE 0.9 % (FLUSH) 0.9 %
5-40 SYRINGE (ML) INJECTION EVERY 12 HOURS SCHEDULED
Status: DISCONTINUED | OUTPATIENT
Start: 2022-08-12 | End: 2022-08-15 | Stop reason: HOSPADM

## 2022-08-12 RX ADMIN — Medication 500 UNITS: at 10:15

## 2022-08-12 RX ADMIN — SODIUM CHLORIDE, PRESERVATIVE FREE 10 ML: 5 INJECTION INTRAVENOUS at 10:15

## 2022-08-12 RX ADMIN — ALBUMIN (HUMAN) 75 G: 0.25 INJECTION, SOLUTION INTRAVENOUS at 08:59

## 2022-08-12 ASSESSMENT — PAIN - FUNCTIONAL ASSESSMENT: PAIN_FUNCTIONAL_ASSESSMENT: NONE - DENIES PAIN

## 2022-08-12 NOTE — PROGRESS NOTES
Patient tolerated paracentesis without distress. 8700 ml of clear, yellow fluid removed. Dry dressing to site. Patient returned to room. Nurse updated.

## 2022-08-12 NOTE — BRIEF OP NOTE
Brief Postoperative Note for Paracentesis    Fernando Orozco  YOB: 1960  [id number]    Pre-operative Diagnosis: Ascites      Post-operative Diagnosis: Same    Procedure: Ultrasound guided Paracentesis     Anesthesia: 1% Lidocaine     Surgeons/Assistants: Leslie Rubio MD    Complications: none    Specimens: were not obtained    Ultrasound guided paracentesis performed. 8700 ml clear yellow fluid obtained from RLQ. Dressing applied. Vital signs were reviewed and were stable after the procedure. [facility]       Electronically signed by Leslie Rubio MD on 8/12/2022 at 10:42 AM

## 2022-08-12 NOTE — H&P
HISTORY and [address]       NAME:  Jojo Burr  MRN: [id number]   YOB: 1960   Date: 8/12/2022   Age: 57 y.o. Gender: male     H&P Update Note    H&P from 7/21/2022 reviewed and updated. Patient examined. INTERVAL HISTORY:     Pt has paracentesis once per week. Pt has the last paracentesis on 8/5/22 with A total of 11 L of clear yellow fluid was removed. Patient was given supplement IV albumin. Patient reports significant relief of symptoms. Pt states he has no pain but he feel discomfort. He complain of SOB with walking or any activity around the house improved by sitting in chair, worse when lying flat. Patient states he has easy bruising but no bleeding   Patient denies swelling in the lower legs bilaterally. No other concerns today, no recent fever/chills, no chest pain, nausea /vomiting, diarrhea or constipation. NPO status: Pt NPO since the past midnight  Medications taken TODAY (with sip of water): pt took all his am medication today with sip of water      Denies personal hx of blood clots. Denies personal hx of MRSA infection. Denies any personal or family hx of previous complications w/anesthesia. No interval changes to past medical history, social history, family history. Review of systems as stated above and otherwise negative. PHYSICAL EXAM:     Vitals: /67   Pulse 79   Temp 97.1 °F (36.2 °C) (Infrared)   Resp 18   Ht 5' 10\" (1.778 m)   Wt 195 lb 4.8 oz (88.6 kg)   SpO2 100%   BMI 28.02 kg/m²  Body mass index is 28.02 kg/m². Patient is alert and oriented, in no distress. Normotensive. Heart rate and rhythm are regular. Lungs clear to auscultation bilaterally. Abdomen is soft, non tender. No pedal edema. Physical exam remains unchanged   No interval changes. I concur with the findings.      MASSIMO Ahuja - CNP on 8/12/2022 at 8:37 AM     HISTORY and [facility] [address]         NAME:  Jojo Burr  MRN: [id number]   YOB: 1960   Date: 7/21/2022   Age: 57 y.o. Gender: male         COMPLAINT AND PRESENT HISTORY:   Aisha Thomas is 57 y.o.,  male, here today for paracentesis. Patient is receiving treatments paracentesis     Patient has history of allochroic Liver cirrhosis with ascites,  Hep C  And he receiving paracentesis treatments once every month. Last paracentesis on 6/28/22 with A total of 10.8 L of clear yellow fluid was removed Patient was given supplement IV albumin. Patient reports significant relief of symptoms. Pt states he has not pain but he feel discomfort. He complain of SOB improved by sitting in chair, worse when lying flat. Patient states he has easy bleeding/bruising. Patient denies swelling in the lower legs bilaterally. No other concerns today, no recent fever/chills, no chest pain, nausea /vomiting, diarrhea or constipation. Review of additional significant medical hx:  HTN, [place], right chest port   current medication R/T condition : Aldactone, corgard, Lipitor, lasix, midodrine. BP Readings from Last 3 Encounters:   08/12/22 122/67   08/09/22 116/71   08/05/22 (!) 114/55     ECG 6/3/2022  SR frequent PAC  Nonspecific ST abnormality  Abnormal ECG    NPO status: Pt NPO since the past midnight  Medications taken TODAY (with sip of water): none  Denies personal hx of blood clots. Denies personal hx of MRSA infection. Denies any personal or family hx of previous complications w/anesthesia.      PAST MEDICAL HISTORY      Past Medical History           Past Medical History:   Diagnosis Date    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 ([facility]      Anxiety      Arthritis      Back pain, chronic       dr. Fuentes Parrish, orthopedic, every 3-4 months, gets steroid injection    [place] esophagus      BPH (benign prostatic hypertrophy)      Cholelithiasis      Cirrhosis ([facility]      COVID-19 12/2020     pt reports he had a positive test while at [facility] in 2020, was asymptomatic    COVID-19 vaccine series completed 5/20/2021, 6/22/2021     Moderna 5/20/2021, 6/22/2021    DDD (degenerative disc disease), lumbar      Depression      Esophageal cancer ([facility]       INVASIVE ADENOCARCINOMA ARISING IN TUBULAR ADENOMA WITH HIGH GRADE DYSPLASIA, ASSOCIATED WITH FOCAL INTESTINAL METAPLASIA    Esophageal varices ([facility]      Fatty liver      GERD (gastroesophageal reflux disease)      Hep C w/o coma, chronic ([facility]      History of alcohol abuse       6-12 beers a day; quit drinking July 2016    History of blood transfusion      History of colon polyps 2016    History of tobacco abuse      [place] (hard of hearing)      Hyperlipidemia      Hypertension      Port-A-Cath in place       right upper chest    Sciatica      Spinal stenosis      Stomach ulcer       hx of    Tubular adenoma of colon 2016, 2018    Vitamin D deficiency      Wears glasses              SURGICAL HISTORY        Past Surgical History             Past Surgical History:   Procedure Laterality Date    BUNIONECTOMY         twice on right side    BUNIONECTOMY Left      CARPAL TUNNEL RELEASE Right      COLONOSCOPY         at age 35    COLONOSCOPY   10/05/2016     polyps-pathology tubular adenoma, and abnormal looking mucosa right colon-pathology-tubular adenoma    COLONOSCOPY N/A 3/30/2018     COLONOSCOPY POLYPECTOMY COLD BIOPSY performed by Ina Arnold MD at [address] [place] [address]   03/30/2018     Small polyp in the sigmoid colon and excised with biopsy forceps--tubular adenoma    COLONOSCOPY N/A 4/16/2022     COLONOSCOPY POLYPECTOMY performed by Ina Arnold MD at [facility], DIAGNOSTIC         EGD    IR PORT PLACEMENT EQUAL OR GREATER THAN 5 YEARS   4/19/2021     IR PORT PLACEMENT EQUAL OR GREATER THAN 5 YEARS 4/19/2021 [facility] SPECIAL PROCEDURES    KNEE SURGERY Left       cyst removed    NASAL SEPTUM SURGERY        NERVE BLOCK Right 11/23/2020     NERVE BLOCK RIGHT CERVICAL STEROID INJECTION  C3-C6 performed by Justen Cerda Demetrio Villanueva MD at [facility]   01/04/16     steroid injection C7 T1    OTHER SURGICAL HISTORY   11/21/2016     Bilateral Lumbar CACHORRO L4-L5 injections    OTHER SURGICAL HISTORY   12/19/2016     lumbar steroid injection    OTHER SURGICAL HISTORY   09/28/2018     BILATERAL L5 CACHORRO (N/A Back)    OTHER SURGICAL HISTORY Right 11/23/2020     cervical injection    PAIN MANAGEMENT PROCEDURE Left 7/9/2020     EPIDURAL STEROID INJECTION LEFT L4 L5 performed by Fracisco Vickers MD at [facility] Left 7/20/2020     LEFT L4 L5 EPIDURAL STEROID INJECTION performed by Fracisco Vickers MD at [facility] Bilateral 8/17/2020     LUMBAR FACET BILATERAL L2-L5 performed by Fracisco Vickers MD at [facility] Bilateral 12/7/2020     NERVE BLOCK BILATERAL LUMBAR MEDIAL BRANCH L2-L5 performed by Fracisco Vickers MD at [facility] AA&/STRD TFRML EPI LUMBAR/SACRAL 1 LEVEL Bilateral 9/6/2018     BILATERAL L5 CACHORRO performed by Fracisco Vickers MD at [facility] AA&/STRD TFRML EPI LUMBAR/SACRAL 1 LEVEL N/A 9/28/2018     BILATERAL L5 CACHORRO performed by Fracisco Vickers MD at [address] [place] [address] N/CARPAL TUNNEL SURG Right 8/29/2017     CARPAL TUNNEL RELEASE RIGHT performed by Lianne Ling MD at [address] [place] [address] N/CARPAL TUNNEL SURG Left 10/31/2017     CARPAL TUNNEL RELEASE performed by Lianne Ling MD at [place] [address] 12/29/2020     EGD BIOPSY performed by Sophie Soliman MD at [address] [place] [address] N/A 2/2/2021     EGD BIOPSY and spot marking performed by Sonja Nava MD at [address] [place] [address] 2/12/2021     ENDOSCOPIC ULTRASOUND, EGD performed by Alba Najera MD at [place] [address]   2/12/2021     EGD DIAGNOSTIC ONLY performed by Alba Najera MD at [place] [address] 2021     EGD BIOPSY performed by Bayron Roberson MD at [address] [place] [address] N/A 2022     EGD BIOPSY performed by Bayron Roberson MD at [address] [place] [address] N/A 4/15/2022     EGD ESOPHAGOGASTRODUODENOSCOPY performed by Chel Amaya MD at [address] [place] [address] N/A 2022     EGD BAND LIGATION performed by Nancy Winters MD at [address] [place] [address] N/A 2022     EGD ESOPHAGOGASTRODUODENOSCOPY performed by Nancy Winters MD at [facility]        Family History             Family History   Problem Relation Age of Onset    Cancer Mother           pancreatic    Cancer Father           bone    Diabetes Sister      Asthma Brother              SOCIAL HISTORY        Social History   Social History                Socioeconomic History    Marital status: Single       Spouse name: None    Number of children: None    Years of education: None    Highest education level: None   Tobacco Use    Smoking status: Former       Packs/day: 1.00       Years: 45.00       Pack years: 45.00       Types: Cigarettes       Quit date: 2017       Years since quittin.5    Smokeless tobacco: Never   Vaping Use    Vaping Use: Never used   Substance and Sexual Activity    Alcohol use: Not Currently       Comment: quit     Drug use: Not Currently       Frequency: 1.0 times per week       Comment: cocaine,  stopped spring 2016    Sexual activity: Yes       Partners: Female   Social History Narrative      in the past, retired      Social Determinants of Health            Financial Resource Strain: Low Risk    Difficulty of Paying Living Expenses: Not hard at all   Food Insecurity: No Food Insecurity    Worried About [address] in the Last Year: Never true    [address] in the Last Year: Never true   Physical Activity: Inactive    Days of Exercise per Week: 0 days    Minutes of Exercise per Session: 0 min               REVIEW OF SYSTEMS       No Known Allergies                 Current Outpatient Medications on File Prior to Encounter   Medication Sig Dispense Refill    furosemide (LASIX) 20 MG tablet Take 1 tablet by mouth 2 times daily 60 tablet 3    spironolactone (ALDACTONE) 100 MG tablet Take 1 tablet by mouth every evening (Patient taking differently: Take 100 mg by mouth every evening Take 1 tablet at 100mg every other day, Other days take 1/2 tablet) 30 tablet 1    pantoprazole (PROTONIX) 40 MG tablet Take 1 tablet by mouth 2 times daily for 14 days 28 tablet 0    sucralfate (CARAFATE) 1 GM tablet Take 1 tablet by mouth 4 times daily for 14 days 56 tablet 0    vitamin D (CHOLECALCIFEROL) 25 MCG (1000 UT) TABS tablet Take 1,000 Units by mouth daily  (Patient not taking: Reported on 7/21/2022)        ondansetron (ZOFRAN-ODT) 4 MG disintegrating tablet dissolve 1 tablet by mouth every 8 hours if needed for nausea and vomiting 10 tablet 0    lactulose (CHRONULAC) 10 GM/15ML solution take 30 milliliters by mouth three times a day 473 mL 1    nadolol (CORGARD) 20 MG tablet take 1 tablet by mouth once daily        FLUoxetine (PROZAC) 20 MG capsule Take 1 capsule by mouth daily 30 capsule 3    atorvastatin (LIPITOR) 20 MG tablet Take 1 tablet by mouth nightly 30 tablet 3    midodrine (PROAMATINE) 5 MG tablet Take 1 tablet by mouth 3 times daily as needed (SBP <110) 90 tablet 3    ferrous sulfate (IRON 325) 325 (65 Fe) MG tablet Take 1 tablet by mouth 2 times daily 60 tablet 5      No current facility-administered medications on file prior to encounter. Review of Systems  Constitutional:  Positive for unexpected weight change. Pt lost weight for about 60 pound over the last year. HENT:  Positive for hearing loss. Eyes:  Positive for visual disturbance. Eye glasses    Respiratory:  Positive for shortness of breath. bilateral upper arms    Neurological:     General: No focal deficit present. Mental Status: He is alert and oriented to person, place, and time. Motor: No weakness.      Gait: Gait normal.  Psychiatric:         Mood and Affect: Mood normal.         Behavior: Behavior normal.                PROVISIONAL DIAGNOSES / SURGERY:       IR US guided paracentesis              Patient Active Problem List     Diagnosis Date Noted    Secondary esophageal varices ([address] 06/07/2022    Esophageal polyp 06/07/2022    Drop in hemoglobin 06/03/2022    Portal hypertension (HCC)      Shortness of breath      Ascites 04/26/2022    Acute kidney failure, unspecified ([address] 04/21/2022    Muscle weakness (generalized) 07/28/2016    Other abnormalities of gait and mobility 07/28/2016    Anemia 04/13/2022    Acute kidney injury ([address] 04/13/2022    Esophageal adenocarcinoma ([address]      Low hemoglobin 12/20/2021    Symptomatic anemia, microcytic, acute 12/20/2021    Hypotension 12/20/2021    Former smoker, 50+ pack years, quit 2016 12/20/2021    HLD (hyperlipidemia) 12/20/2021    Abnormal findings on diagnostic imaging of spine 12/14/2021    Cervical spinal stenosis 12/14/2021    Spinal stenosis of lumbar region with neurogenic claudication 12/14/2021    Severe comorbid illness 11/30/2021    Gait instability 11/30/2021    Current smoker 04/05/2021    COVID-19 02/23/2021    Anxiety 02/23/2021    Malignant neoplasm of lower third of esophagus ([address]      Hypocalcemia 12/26/2020    Hypophosphatemia 12/26/2020    Gastrointestinal hemorrhage with melena      Alcohol abuse      Altered mental status      Acute gastrointestinal bleeding 12/23/2020    Thrombocytopenia ([address] 12/23/2020    Hepatitis C virus infection resolved after antiviral drug therapy 12/23/2020    Cervical facet syndrome 11/23/2020    Lumbar facet arthropathy 08/17/2020    Elevated LFTs 08/12/2020    Seasonal allergies 08/12/2020    S/P epidural steroid injection 08/05/2020 Essential hypertension 04/24/2019    Recurrent major depressive disorder in partial remission ([facility] 04/24/2019    Pure hypercholesterolemia 02/04/2019    Hypokalemia 02/04/2019    Vitamin D deficiency 09/20/2017    History of hepatitis C 09/11/2017    Ganglion cyst 05/31/2017    Carpal tunnel syndrome of right wrist 05/31/2017    Tinnitus 03/23/2017    Eustachian tube dysfunction 03/23/2017    Lumbar radiculitis 11/08/2016    Lumbar disc herniation 11/08/2016    Gynecomastia, male 10/26/2016    Depression 10/13/2016    Vertebrogenic low back pain 10/06/2016    DDD (degenerative disc disease), lumbar 10/06/2016    Hepatic cirrhosis ([address] 09/15/2016    Psychophysiologic insomnia 09/14/2016    Cirrhosis ([address]      Hep C w/o coma, chronic (HCC)      Fatty liver      Calculus of gallbladder without cholecystitis 08/10/2016    Chronic viral hepatitis B without delta agent and without coma ([address] 07/22/2016    Hypomagnesemia      Alcohol withdrawal syndrome without complication ([address] 60/64/8251    Cervical radicular pain 01/04/2016    Tubular adenoma of colon 01/01/2016    History of colon polyps 01/01/2016    Back pain, chronic 04/19/2012    Hearing difficulty 04/19/2012    GERD (gastroesophageal reflux disease) 04/19/2012            MASSIMO Hadley - CNP on 7/21/2022 at 10:16 AM

## 2022-08-12 NOTE — DISCHARGE INSTRUCTIONS
DISCHARGE INSTRUCTIONS FOR PARACENTESIS    In order to continue your care at home, please follow the instructions below. Medications    Use over-the-counter pain medication as directed on bottle for pain control    Post Procedure Site/Care/Activity  For up to 2 days after the procedure, you may have a small amount of clear fluid coming out of the site where the needle was inserted, especially if you had a lot of fluid removed. You may need to change the bandage on the site. Do not lie totally flat until morning. You can do your normal activities after the procedure, unless instructed differently. Call your doctor or seek immediate medical care if:   You have symptoms of infection, such as increased pain, swelling, warmth, or redness, red streaks or pus. An oral temperature (by mouth) is 101 degrees or higher (fever), chills, fever. You are dizzy or lightheaded, or you feel like you may faint. You have new or worse belly pain. Watch closely for changes in your health, and be sure to contact your doctor if:   Fluid builds up in your belly again. You do not get better as expected.       IF YOU CANNOT REACH THE DOCTOR, GO TO THE NEAREST EMERGENCY ROOM OR CALL 911    Phone: Interventional Radiology   [phone]      Dr Marva Greco  After hours Radiology   [phone]        8.7 Liters removed

## 2022-08-12 NOTE — H&P (VIEW-ONLY)
HISTORY and [address]       NAME:  Sophia Epps  MRN: [id number]   YOB: 1960   Date: 8/12/2022   Age: 57 y.o. Gender: male     H&P Update Note    H&P from 7/21/2022 reviewed and updated. Patient examined. INTERVAL HISTORY:     Pt has paracentesis once per week. Pt has the last paracentesis on 8/5/22 with A total of 11 L of clear yellow fluid was removed. Patient was given supplement IV albumin. Patient reports significant relief of symptoms. Pt states he has no pain but he feel discomfort. He complain of SOB with walking or any activity around the house improved by sitting in chair, worse when lying flat. Patient states he has easy bruising but no bleeding   Patient denies swelling in the lower legs bilaterally. No other concerns today, no recent fever/chills, no chest pain, nausea /vomiting, diarrhea or constipation. NPO status: Pt NPO since the past midnight  Medications taken TODAY (with sip of water): pt took all his am medication today with sip of water      Denies personal hx of blood clots. Denies personal hx of MRSA infection. Denies any personal or family hx of previous complications w/anesthesia. No interval changes to past medical history, social history, family history. Review of systems as stated above and otherwise negative. PHYSICAL EXAM:     Vitals: /67   Pulse 79   Temp 97.1 °F (36.2 °C) (Infrared)   Resp 18   Ht 5' 10\" (1.778 m)   Wt 195 lb 4.8 oz (88.6 kg)   SpO2 100%   BMI 28.02 kg/m²  Body mass index is 28.02 kg/m². Patient is alert and oriented, in no distress. Normotensive. Heart rate and rhythm are regular. Lungs clear to auscultation bilaterally. Abdomen is soft, non tender. No pedal edema. Physical exam remains unchanged   No interval changes. I concur with the findings.      Jacinto Reid, MASSIMO - CNP on 8/12/2022 at 8:37 AM     HISTORY and [address]         NAME:  Sophia Epps  MRN: [id number]   YOB: 1960   Date: 7/21/2022   Age: 57 y.o. Gender: male         COMPLAINT AND PRESENT HISTORY:   Yan Kendall is 57 y.o.,  male, here today for paracentesis. Patient is receiving treatments paracentesis     Patient has history of allochroic Liver cirrhosis with ascites,  Hep C  And he receiving paracentesis treatments once every month. Last paracentesis on 6/28/22 with A total of 10.8 L of clear yellow fluid was removed Patient was given supplement IV albumin. Patient reports significant relief of symptoms. Pt states he has not pain but he feel discomfort. He complain of SOB improved by sitting in chair, worse when lying flat. Patient states he has easy bleeding/bruising. Patient denies swelling in the lower legs bilaterally. No other concerns today, no recent fever/chills, no chest pain, nausea /vomiting, diarrhea or constipation. Review of additional significant medical hx:  HTN, [place], right chest port   current medication R/T condition : Aldactone, corgard, Lipitor, lasix, midodrine. BP Readings from Last 3 Encounters:   08/12/22 122/67   08/09/22 116/71   08/05/22 (!) 114/55     ECG 6/3/2022  SR frequent PAC  Nonspecific ST abnormality  Abnormal ECG    NPO status: Pt NPO since the past midnight  Medications taken TODAY (with sip of water): none  Denies personal hx of blood clots. Denies personal hx of MRSA infection. Denies any personal or family hx of previous complications w/anesthesia.      PAST MEDICAL HISTORY      Past Medical History           Past Medical History:   Diagnosis Date    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 ([facility] [address]      Anxiety      Arthritis      Back pain, chronic       dr. Slava Jimenez, orthopedic, every 3-4 months, gets steroid injection    [place] esophagus      BPH (benign prostatic hypertrophy)      Cholelithiasis      Cirrhosis ([facility] [address]      COVID-19 12/2020     pt reports he had a positive test while at [facility] in 2020, was asymptomatic    COVID-19 vaccine series completed 5/20/2021, 6/22/2021     Moderna 5/20/2021, 6/22/2021    DDD (degenerative disc disease), lumbar      Depression      Esophageal cancer ([facility] [address]       INVASIVE ADENOCARCINOMA ARISING IN TUBULAR ADENOMA WITH HIGH GRADE DYSPLASIA, ASSOCIATED WITH FOCAL INTESTINAL METAPLASIA    Esophageal varices ([facility] [address]      Fatty liver      GERD (gastroesophageal reflux disease)      Hep C w/o coma, chronic ([facility] [address]      History of alcohol abuse       6-12 beers a day; quit drinking July 2016    History of blood transfusion      History of colon polyps 2016    History of tobacco abuse      [place] (hard of hearing)      Hyperlipidemia      Hypertension      Port-A-Cath in place       right upper chest    Sciatica      Spinal stenosis      Stomach ulcer       hx of    Tubular adenoma of colon 2016, 2018    Vitamin D deficiency      Wears glasses              SURGICAL HISTORY        Past Surgical History             Past Surgical History:   Procedure Laterality Date    BUNIONECTOMY         twice on right side    BUNIONECTOMY Left      CARPAL TUNNEL RELEASE Right      COLONOSCOPY         at age 35    COLONOSCOPY   10/05/2016     polyps-pathology tubular adenoma, and abnormal looking mucosa right colon-pathology-tubular adenoma    COLONOSCOPY N/A 3/30/2018     COLONOSCOPY POLYPECTOMY COLD BIOPSY performed by Kassandra Abarca MD at [address] [place] [address]   03/30/2018     Small polyp in the sigmoid colon and excised with biopsy forceps--tubular adenoma    COLONOSCOPY N/A 4/16/2022     COLONOSCOPY POLYPECTOMY performed by Kassandra Abarca MD at [address] [place] [address], [place], DIAGNOSTIC         EGD    IR PORT PLACEMENT EQUAL OR GREATER THAN 5 YEARS   4/19/2021     IR PORT PLACEMENT EQUAL OR GREATER THAN 5 YEARS 4/19/2021 [facility] SPECIAL PROCEDURES    KNEE SURGERY Left       cyst removed    NASAL SEPTUM SURGERY        NERVE BLOCK Right 11/23/2020     NERVE BLOCK RIGHT CERVICAL STEROID INJECTION  C3-C6 performed by Margo Lopez Aurora Mora MD at [address] [address]   01/04/16     steroid injection C7 T1    OTHER SURGICAL HISTORY   11/21/2016     Bilateral Lumbar CACHORRO L4-L5 injections    OTHER SURGICAL HISTORY   12/19/2016     lumbar steroid injection    OTHER SURGICAL HISTORY   09/28/2018     BILATERAL L5 CACHORRO (N/A Back)    OTHER SURGICAL HISTORY Right 11/23/2020     cervical injection    PAIN MANAGEMENT PROCEDURE Left 7/9/2020     EPIDURAL STEROID INJECTION LEFT L4 L5 performed by Sharon Bermudez MD at [facility] Left 7/20/2020     LEFT L4 L5 EPIDURAL STEROID INJECTION performed by Sharon Bermudez MD at [facility] Bilateral 8/17/2020     LUMBAR FACET BILATERAL L2-L5 performed by Sharon Bermudez MD at [facility] Bilateral 12/7/2020     NERVE BLOCK BILATERAL LUMBAR MEDIAL BRANCH L2-L5 performed by Sharon Bermudez MD at [facility] AA&/STRD TFRML EPI LUMBAR/SACRAL 1 LEVEL Bilateral 9/6/2018     BILATERAL L5 CACHORRO performed by Sharon Bermudez MD at [facility] AA&/STRD TFRML EPI LUMBAR/SACRAL 1 LEVEL N/A 9/28/2018     BILATERAL L5 CACHORRO performed by Sharon Bermudez MD at [address] [place] [address] N/CARPAL TUNNEL SURG Right 8/29/2017     CARPAL TUNNEL RELEASE RIGHT performed by Maddie Wilson MD at [address] [place] [address] N/CARPAL TUNNEL SURG Left 10/31/2017     CARPAL TUNNEL RELEASE performed by Maddie Wilson MD at [address] [place] [address] 12/29/2020     EGD BIOPSY performed by Ben Adamson MD at [address] [place] [address] N/A 2/2/2021     EGD BIOPSY and spot marking performed by Itzel Rodriguez MD at [address] [place] [address] 2/12/2021     ENDOSCOPIC ULTRASOUND, EGD performed by Angelo Burks MD at [address] [address]   2/12/2021     EGD DIAGNOSTIC ONLY performed by Angelo Burks MD at [address] [place] [address] 2021     EGD BIOPSY performed by Lillie Leon MD at [address] [place] [address] N/A 2022     EGD BIOPSY performed by Lillie Leon MD at [address] [place] [address] N/A 4/15/2022     EGD ESOPHAGOGASTRODUODENOSCOPY performed by Alma Jarrett MD at [facility] N/A 2022     EGD BAND LIGATION performed by Imani Larose MD at [address] [place] [address] N/A 2022     EGD ESOPHAGOGASTRODUODENOSCOPY performed by Imani Larose MD at [facility]        Family History             Family History   Problem Relation Age of Onset    Cancer Mother           pancreatic    Cancer Father           bone    Diabetes Sister      Asthma Brother              SOCIAL HISTORY        Social History   Social History                Socioeconomic History    Marital status: Single       Spouse name: None    Number of children: None    Years of education: None    Highest education level: None   Tobacco Use    Smoking status: Former       Packs/day: 1.00       Years: 45.00       Pack years: 45.00       Types: Cigarettes       Quit date: 2017       Years since quittin.5    Smokeless tobacco: Never   Vaping Use    Vaping Use: Never used   Substance and Sexual Activity    Alcohol use: Not Currently       Comment: quit     Drug use: Not Currently       Frequency: 1.0 times per week       Comment: cocaine,  stopped spring 2016    Sexual activity: Yes       Partners: Female   Social History Narrative      in the past, retired      Social Determinants of Health            Financial Resource Strain: Low Risk    Difficulty of Paying Living Expenses: Not hard at all   Food Insecurity: No Food Insecurity    Worried About [address] in the Last Year: Never true    [address] in the Last Year: Never true   Physical Activity: Inactive    Days of Exercise per Week: 0 days    Minutes of Exercise per Session: 0 min               REVIEW OF SYSTEMS       No Known Allergies                 Current Outpatient Medications on File Prior to Encounter   Medication Sig Dispense Refill    furosemide (LASIX) 20 MG tablet Take 1 tablet by mouth 2 times daily 60 tablet 3    spironolactone (ALDACTONE) 100 MG tablet Take 1 tablet by mouth every evening (Patient taking differently: Take 100 mg by mouth every evening Take 1 tablet at 100mg every other day, Other days take 1/2 tablet) 30 tablet 1    pantoprazole (PROTONIX) 40 MG tablet Take 1 tablet by mouth 2 times daily for 14 days 28 tablet 0    sucralfate (CARAFATE) 1 GM tablet Take 1 tablet by mouth 4 times daily for 14 days 56 tablet 0    vitamin D (CHOLECALCIFEROL) 25 MCG (1000 UT) TABS tablet Take 1,000 Units by mouth daily  (Patient not taking: Reported on 7/21/2022)        ondansetron (ZOFRAN-ODT) 4 MG disintegrating tablet dissolve 1 tablet by mouth every 8 hours if needed for nausea and vomiting 10 tablet 0    lactulose (CHRONULAC) 10 GM/15ML solution take 30 milliliters by mouth three times a day 473 mL 1    nadolol (CORGARD) 20 MG tablet take 1 tablet by mouth once daily        FLUoxetine (PROZAC) 20 MG capsule Take 1 capsule by mouth daily 30 capsule 3    atorvastatin (LIPITOR) 20 MG tablet Take 1 tablet by mouth nightly 30 tablet 3    midodrine (PROAMATINE) 5 MG tablet Take 1 tablet by mouth 3 times daily as needed (SBP <110) 90 tablet 3    ferrous sulfate (IRON 325) 325 (65 Fe) MG tablet Take 1 tablet by mouth 2 times daily 60 tablet 5      No current facility-administered medications on file prior to encounter. Review of Systems  Constitutional:  Positive for unexpected weight change. Pt lost weight for about 60 pound over the last year. HENT:  Positive for hearing loss. Eyes:  Positive for visual disturbance. Eye glasses    Respiratory:  Positive for shortness of breath. Essential hypertension 04/24/2019    Recurrent major depressive disorder in partial remission ([facility] [address] 04/24/2019    Pure hypercholesterolemia 02/04/2019    Hypokalemia 02/04/2019    Vitamin D deficiency 09/20/2017    History of hepatitis C 09/11/2017    Ganglion cyst 05/31/2017    Carpal tunnel syndrome of right wrist 05/31/2017    Tinnitus 03/23/2017    Eustachian tube dysfunction 03/23/2017    Lumbar radiculitis 11/08/2016    Lumbar disc herniation 11/08/2016    Gynecomastia, male 10/26/2016    Depression 10/13/2016    Vertebrogenic low back pain 10/06/2016    DDD (degenerative disc disease), lumbar 10/06/2016    Hepatic cirrhosis ([address] 09/15/2016    Psychophysiologic insomnia 09/14/2016    Cirrhosis ([address]      Hep C w/o coma, chronic (HCC)      Fatty liver      Calculus of gallbladder without cholecystitis 08/10/2016    Chronic viral hepatitis B without delta agent and without coma ([address] 07/22/2016    Hypomagnesemia      Alcohol withdrawal syndrome without complication ([address] 30/57/3149    Cervical radicular pain 01/04/2016    Tubular adenoma of colon 01/01/2016    History of colon polyps 01/01/2016    Back pain, chronic 04/19/2012    Hearing difficulty 04/19/2012    GERD (gastroesophageal reflux disease) 04/19/2012            MASSIMO Hadley - CNP on 7/21/2022 at 10:16 AM

## 2022-08-15 ENCOUNTER — HOSPITAL ENCOUNTER (OUTPATIENT)
Dept: PHYSICAL THERAPY | Age: 62
Setting detail: THERAPIES SERIES
Discharge: HOME | End: 2022-08-15
Payer: MEDICARE

## 2022-08-15 ENCOUNTER — HOSPITAL ENCOUNTER (OUTPATIENT)
Age: 62
Setting detail: SPECIMEN
Discharge: HOME | End: 2022-08-15

## 2022-08-15 DIAGNOSIS — K70.31 ASCITES DUE TO ALCOHOLIC CIRRHOSIS (HCC): ICD-10-CM

## 2022-08-15 DIAGNOSIS — D64.9 ANEMIA, UNSPECIFIED TYPE: ICD-10-CM

## 2022-08-15 LAB
ABSOLUTE EOS #: 0 K/UL (ref 0–0.4)
ABSOLUTE IMMATURE GRANULOCYTE: 0 K/UL (ref 0–0.3)
ABSOLUTE LYMPH #: 0.59 K/UL (ref 1–4.8)
ABSOLUTE MONO #: 0.52 K/UL (ref 0.1–0.8)
ALBUMIN SERPL-MCNC: 3.2 G/DL (ref 3.5–5.2)
ALBUMIN/GLOBULIN RATIO: 1.5 (ref 1–2.5)
ALP BLD-CCNC: 144 U/L (ref 40–129)
ALT SERPL-CCNC: 15 U/L (ref 5–41)
ANION GAP SERPL CALCULATED.3IONS-SCNC: 12 MMOL/L (ref 9–17)
AST SERPL-CCNC: 33 U/L
BASOPHILS # BLD: 0 % (ref 0–2)
BASOPHILS ABSOLUTE: 0 K/UL (ref 0–0.2)
BILIRUB SERPL-MCNC: 1.63 MG/DL (ref 0.3–1.2)
BUN BLDV-MCNC: 8 MG/DL (ref 8–23)
CALCIUM SERPL-MCNC: 8.7 MG/DL (ref 8.6–10.4)
CHLORIDE BLD-SCNC: 93 MMOL/L (ref 98–107)
CO2: 20 MMOL/L (ref 20–31)
CREAT SERPL-MCNC: 0.64 MG/DL (ref 0.7–1.2)
EOSINOPHILS RELATIVE PERCENT: 0 % (ref 1–4)
GFR AFRICAN AMERICAN: >60 ML/MIN
GFR NON-AFRICAN AMERICAN: >60 ML/MIN
GFR SERPL CREATININE-BSD FRML MDRD: ABNORMAL ML/MIN/{1.73_M2}
GLUCOSE BLD-MCNC: 93 MG/DL (ref 70–99)
HCT VFR BLD CALC: 25.4 % (ref 40.7–50.3)
HEMOGLOBIN: 7.4 G/DL (ref 13–17)
IMMATURE GRANULOCYTES: 0 %
LYMPHOCYTES # BLD: 8 % (ref 24–44)
MCH RBC QN AUTO: 25.5 PG (ref 25.2–33.5)
MCHC RBC AUTO-ENTMCNC: 29.1 G/DL (ref 28.4–34.8)
MCV RBC AUTO: 87.6 FL (ref 82.6–102.9)
MONOCYTES # BLD: 7 % (ref 1–7)
MORPHOLOGY: ABNORMAL
NRBC AUTOMATED: 0 PER 100 WBC
PDW BLD-RTO: 15.8 % (ref 11.8–14.4)
PLATELET # BLD: 184 K/UL (ref 138–453)
PMV BLD AUTO: 9.9 FL (ref 8.1–13.5)
POTASSIUM SERPL-SCNC: 4.9 MMOL/L (ref 3.7–5.3)
RBC # BLD: 2.9 M/UL (ref 4.21–5.77)
SEG NEUTROPHILS: 85 % (ref 36–66)
SEGMENTED NEUTROPHILS ABSOLUTE COUNT: 6.29 K/UL (ref 1.8–7.7)
SODIUM BLD-SCNC: 125 MMOL/L (ref 135–144)
TOTAL PROTEIN: 5.3 G/DL (ref 6.4–8.3)
WBC # BLD: 7.4 K/UL (ref 3.5–11.3)

## 2022-08-15 PROCEDURE — 97110 THERAPEUTIC EXERCISES: CPT

## 2022-08-15 NOTE — FLOWSHEET NOTE
[facility]   [address]   Phone: [phone]   Fax: [phone]    Physical Therapy Daily Treatment Note      Date:  8/15/2022  Patient Name:  Francis Montano    :  [birth year]  MRN: [id number]  Physician: Stephanie Castillo, PA                                  Insurance: [address] [place] [address] (Aur[facility] Camarena required after evaluation)   Medical Diagnosis:   K70.31 (LNL-07-IV) - Alcoholic cirrhosis of liver with ascites ([facility]   M48.062 (ICD-10-CM) - Spinal stenosis of lumbar region with neurogenic claudication   Z91.81 (ICD-10-CM) - At high risk for falls      Rehab Codes: M54.5   Onset Date: chronic              Next 's appt. : 23  Visit Count: 2                                Cancel/No Show: 1/0    Subjective:  Pt reports increased pain with activity and standing over the weekend. Reports decreased pain after getting fluid drained on Friday. States still only able to walk about 5 minutes before having to rest due to pain and fatigue. Pain:  [x] Yes  [] No Location:  Lumbar back  Pain Rating: (0-10 scale) 310  Pain altered Tx:  [x] No  [] Yes  Action:  Comments:    Objective:  Modalities:  Precautions:  Exercises:  Exercise Reps/ Time Weight/ Level Comments Completed today   Supine hamstring stretch 2x30\"   x   Supine piriformis 2x30\"   x   PPT  10x5\" hold     x   PPT with march 10 reps     x   PPT Bent knee fall out 10 reps Yellow   x   SLR 10 reps     x   Bridge 10 reps x 2 sets   x   Supine Hip Abd/Add 2x10 reps Yellow Ball squeeze,  x   Clamshells 10 reps Yellow  x   Side lying Hip abduction 10x reps  Yellow   x   Standing Hip extension  10 reps Red    x                                Other:     Specific Instructions for next treatment: HEP, core stabilization and hip strengthening, posture training, lumbar extension/up right posture during standing/ambulation. Assessment: [x] Progressing toward goals.   Initiated exercises for hip and core stabilization and strengthening. Cueing needed for proper TrA/PPT technique throughout. Denies increased pain with all exercises except attempting hip abduction in standing, painful and did not perform. Does note fatigue in hip with side lying hip abduction and clamshells. Seated rest break needed after standing hip extension. Rested 2 minutes and ambulated out of clinic without issue. [] No change. [] Other:    [x] Patient would continue to benefit from skilled physical therapy services in order to: decrease pain for patient to complete ADLs/IADLs with least amount of compensation or restriction. LTGs: (to be met in 18 treatments)  ? Pain: to 3/10 at most for patient to participate fully in social life. ? ROM: of lumbar spine to [facility] without pain for patient to don/doff socks with ease. ? Strength: of B LEs to 4+/5 to assist in stabilization to lumbar spine to decrease pain. Improve abdominal strength to at least  4-/5 to assist in stabilization to lumbar spine for patient to be able to lift objects safely without pain. ? Function: with score on Modified Oswestry to </= 29% impairment to improve standing/walking tolerance. Independent with Home Exercise Programs  Patient to report standing for >15' to complete IADLs with less breaks. STG: (to be met in 9 treatments)  Decrease pain to 5/10 at most to improve patients sleep. Patient to report walking for >10' with minimal pain to ease completion of IADLs. Patient goals: \"PAIN RELIEF, DUH! \"    Pt. Education:  [x] Yes  [] No  [x] Reviewed Prior HEP/Ed  Method of Education: [x] Verbal  [x] Demo  [] Written  Comprehension of Education:  [x] Verbalizes understanding. [x] Demonstrates understanding. [] Needs review. [] Demonstrates/verbalizes HEP/Ed previously given. Plan: [x] Continue per plan of care.    [] Other:      Treatment Charges: Mins Units   []  Modalities     [x]  Ther Exercise 39 3   []  Manual Therapy     []  Ther Activities     []  Aquatics     []  Neuromuscular     [] Vasocompression     [] Gait Training     [] Dry needling        [] 1 or 2 muscles        [] 3 or more muscles     []  Other     Total Treatment time 39 3     Time In:  8639           Time Out: 9646    Electronically signed by:  Kami Rosas PTA

## 2022-08-16 ENCOUNTER — TELEPHONE (OUTPATIENT)
Dept: GASTROENTEROLOGY | Age: 62
End: 2022-08-16

## 2022-08-16 ENCOUNTER — TELEPHONE (OUTPATIENT)
Dept: PRIMARY CARE CLINIC | Age: 62
End: 2022-08-16

## 2022-08-16 DIAGNOSIS — D69.6 THROMBOCYTOPENIA (HCC): ICD-10-CM

## 2022-08-16 DIAGNOSIS — K70.30 ALCOHOLIC CIRRHOSIS, UNSPECIFIED WHETHER ASCITES PRESENT (HCC): ICD-10-CM

## 2022-08-16 DIAGNOSIS — D63.8 ANEMIA OF CHRONIC DISEASE: Primary | ICD-10-CM

## 2022-08-16 LAB
HCT VFR BLD CALC: 25.4 % (ref 40.7–50.3)
HEMOGLOBIN: 7.4 G/DL (ref 13–17)

## 2022-08-16 NOTE — TELEPHONE ENCOUNTER
Dr Kate Hooks office called and stated that there is no need for a transfusion at this time. They called and informed patient.

## 2022-08-16 NOTE — RESULT ENCOUNTER NOTE
Call and advise patient that the results showed hemoglobin of 7.4 patient should let Dr. Jose Alejandro Rose know about this result. He will be needing blood transfusion soon if [facility] will not order this we will need to set up out patient. . I will also place referral to hematology to help with this as it may be needed on a more regular basis. Go to ER with chest pain SOB or signs of bleeding.

## 2022-08-16 NOTE — TELEPHONE ENCOUNTER
Pts hgb was 7.4, pts pcp wanted a blood transfusion. Writer spoke with megan and he states at this time an infusion is not needed due to his liver issues, pt is stable where he is. Per megan infusions should not be considered unless he goes under 7. Pt and pcp office notified.

## 2022-08-17 ENCOUNTER — HOSPITAL ENCOUNTER (OUTPATIENT)
Dept: PHYSICAL THERAPY | Age: 62
Setting detail: THERAPIES SERIES
Discharge: HOME | End: 2022-08-17
Payer: MEDICARE

## 2022-08-17 PROCEDURE — 97110 THERAPEUTIC EXERCISES: CPT

## 2022-08-17 NOTE — FLOWSHEET NOTE
We did not call pt. Looks like [facility] called pt.    Spoke with Jessica WONG and she did call pt. She will call her back. See her phone note from today.    We are still awaiting response from [facility].   extension/up right posture during standing/ambulation. Assessment: [] Progressing toward goals. [] No change. [x] Other: Performed exercises to patient's tolerance today. Hot pack applied to lumbar back in supine to help decrease pain and decrease muscle tightness in lumbar back. Added LTR to decrease lumbar back and hip tightness. Tolerated exercises well and notes less pain and \"I feel much better\" at end of treatment today. [x] Patient would continue to benefit from skilled physical therapy services in order to: decrease pain for patient to complete ADLs/IADLs with least amount of compensation or restriction. LTGs: (to be met in 18 treatments)  ? Pain: to 3/10 at most for patient to participate fully in social life. ? ROM: of lumbar spine to [facility] without pain for patient to don/doff socks with ease. ? Strength: of B LEs to 4+/5 to assist in stabilization to lumbar spine to decrease pain. Improve abdominal strength to at least  4-/5 to assist in stabilization to lumbar spine for patient to be able to lift objects safely without pain. ? Function: with score on Modified Oswestry to </= 29% impairment to improve standing/walking tolerance. Independent with Home Exercise Programs  Patient to report standing for >15' to complete IADLs with less breaks. STG: (to be met in 9 treatments)  Decrease pain to 5/10 at most to improve patients sleep. Patient to report walking for >10' with minimal pain to ease completion of IADLs. Patient goals: \"PAIN RELIEF, DUH! \"    Pt. Education:  [x] Yes  [] No  [x] Reviewed Prior HEP/Ed  Method of Education: [x] Verbal  [x] Demo  [] Written  Comprehension of Education:  [x] Verbalizes understanding. [x] Demonstrates understanding. [] Needs review. [] Demonstrates/verbalizes HEP/Ed previously given. Plan: [x] Continue per plan of care.    [] Other:      Treatment Charges: Mins Units   []  Modalities     []  Ther Exercise 38 3   []  Manual Therapy     []  Ther Activities     []  Aquatics     []  Neuromuscular     [] Vasocompression     [] Gait Training     [] Dry needling        [] 1 or 2 muscles        [] 3 or more muscles     []  Other     Total Treatment time 38 3     Time In:  1133           Time Out: 9103     Electronically signed by:  Grupo Spivey PTA

## 2022-08-19 ENCOUNTER — HOSPITAL ENCOUNTER (OUTPATIENT)
Dept: INTERVENTIONAL RADIOLOGY/VASCULAR | Age: 62
Discharge: HOME | End: 2022-08-21
Payer: MEDICARE

## 2022-08-19 VITALS
HEART RATE: 71 BPM | RESPIRATION RATE: 18 BRPM | TEMPERATURE: 97.7 F | WEIGHT: 193 LBS | DIASTOLIC BLOOD PRESSURE: 57 MMHG | HEIGHT: 70 IN | SYSTOLIC BLOOD PRESSURE: 103 MMHG | BODY MASS INDEX: 27.63 KG/M2 | OXYGEN SATURATION: 99 %

## 2022-08-19 DIAGNOSIS — K70.31 ALCOHOLIC CIRRHOSIS OF LIVER WITH ASCITES (HCC): ICD-10-CM

## 2022-08-19 PROCEDURE — P9047 ALBUMIN (HUMAN), 25%, 50ML: HCPCS | Performed by: RADIOLOGY

## 2022-08-19 PROCEDURE — 7100000010 HC PHASE II RECOVERY - FIRST 15 MIN

## 2022-08-19 PROCEDURE — 6360000002 HC RX W HCPCS: Performed by: RADIOLOGY

## 2022-08-19 PROCEDURE — 2709999900 IR US GUIDED PARACENTESIS

## 2022-08-19 PROCEDURE — 7100000011 HC PHASE II RECOVERY - ADDTL 15 MIN

## 2022-08-19 PROCEDURE — 49083 ABD PARACENTESIS W/IMAGING: CPT

## 2022-08-19 RX ORDER — HEPARIN SODIUM (PORCINE) LOCK FLUSH IV SOLN 100 UNIT/ML 100 UNIT/ML
500 SOLUTION INTRAVENOUS
Status: ACTIVE | OUTPATIENT
Start: 2022-08-19 | End: 2022-08-19

## 2022-08-19 RX ORDER — SODIUM CHLORIDE 9 MG/ML
INJECTION, SOLUTION INTRAVENOUS PRN
Status: DISCONTINUED | OUTPATIENT
Start: 2022-08-19 | End: 2022-08-22 | Stop reason: HOSPADM

## 2022-08-19 RX ORDER — SODIUM CHLORIDE 0.9 % (FLUSH) 0.9 %
5-40 SYRINGE (ML) INJECTION EVERY 12 HOURS SCHEDULED
Status: DISCONTINUED | OUTPATIENT
Start: 2022-08-19 | End: 2022-08-22 | Stop reason: HOSPADM

## 2022-08-19 RX ORDER — SODIUM CHLORIDE 0.9 % (FLUSH) 0.9 %
5-40 SYRINGE (ML) INJECTION PRN
Status: DISCONTINUED | OUTPATIENT
Start: 2022-08-19 | End: 2022-08-22 | Stop reason: HOSPADM

## 2022-08-19 RX ORDER — ALBUMIN (HUMAN) 12.5 G/50ML
50 SOLUTION INTRAVENOUS ONCE
Status: COMPLETED | OUTPATIENT
Start: 2022-08-19 | End: 2022-08-19

## 2022-08-19 RX ORDER — ALBUMIN (HUMAN) 12.5 G/50ML
25 SOLUTION INTRAVENOUS ONCE
Status: DISCONTINUED | OUTPATIENT
Start: 2022-08-19 | End: 2022-08-22 | Stop reason: HOSPADM

## 2022-08-19 RX ADMIN — ALBUMIN (HUMAN) 50 G: 0.25 INJECTION, SOLUTION INTRAVENOUS at 14:01

## 2022-08-19 ASSESSMENT — PAIN - FUNCTIONAL ASSESSMENT: PAIN_FUNCTIONAL_ASSESSMENT: NONE - DENIES PAIN

## 2022-08-19 ASSESSMENT — ENCOUNTER SYMPTOMS
COUGH: 0
SORE THROAT: 0
WHEEZING: 0
TROUBLE SWALLOWING: 0
SHORTNESS OF BREATH: 0
RHINORRHEA: 0

## 2022-08-19 NOTE — BRIEF OP NOTE
Brief Postoperative Note    Kwasi Salgado  YOB: 1960  [id number]    Pre-operative Diagnosis: Ascites    Post-operative Diagnosis: Same    Procedure: Paracentesis    Anesthesia: Local    Surgeons/Assistants: Cristina Schaefer    Estimated Blood Loss: less than 50     Complications: None    Specimens: Was Not Obtained    Findings: U/S guided right paracentesis yielding 7.9 L yellow fluid. Albumin administration started.      Electronically signed by Jojo Crenshaw MD on 8/19/2022 at 2:47 PM

## 2022-08-19 NOTE — DISCHARGE INSTRUCTIONS
DISCHARGE INSTRUCTIONS FOR PARACENTESIS    In order to continue your care at home, please follow the instructions below. Medications    Use over-the-counter pain medication as directed on bottle for pain control    Post Procedure Site/Care/Activity  For up to 2 days after the procedure, you may have a small amount of clear fluid coming out of the site where the needle was inserted, especially if you had a lot of fluid removed. You may need to change the bandage on the site. Do not lie totally flat until morning. You can do your normal activities after the procedure, unless instructed differently. Call your doctor or seek immediate medical care if:   You have symptoms of infection, such as increased pain, swelling, warmth, or redness, red streaks or pus. An oral temperature (by mouth) is 101 degrees or higher (fever), chills, fever. You are dizzy or lightheaded, or you feel like you may faint. You have new or worse belly pain. Watch closely for changes in your health, and be sure to contact your doctor if:   Fluid builds up in your belly again. You do not get better as expected.       IF YOU CANNOT REACH THE DOCTOR, GO TO THE NEAREST EMERGENCY ROOM OR CALL 911    Phone: Interventional Radiology   [phone]  After hours Radiology   [phone]   Dr Benji Bajwa performed your procedure today  7.9L were drained  After weight is 183.2

## 2022-08-19 NOTE — PROGRESS NOTES
Patient tolerated well without distress. 7.9 ml of clear, yellow fluid removed. Dry dressing to site. Patient returned to room. Nurse updated.

## 2022-08-19 NOTE — H&P
HISTORY and Tre[facility] ALAYNA [address]       NAME:  Francis Montano  MRN: [id number]   YOB: 1960   Date: 8/19/2022   Age: 60 y.o. Gender: male     COMPLAINT AND PRESENT HISTORY:   Francis Montano is 60 y.o.,  male, undergoing IR [address] for ALCOHOLIC CIRRHOSIS OF LIVER WITH ASCITES per Dr. Rocael Monique.    HPI:  PRE-PARACENTESIS QUESTIONNAIRE:   Last paracentesis date: 8-12-22  Amount removed: 8.7 L- straw colored, non-turbid. Tolerated well: Yes. Any complications: None. Albumin given: Yes. Interval between paracentesis dates: Weekly. GI HX: ESOPHAGEAL CA, HEP. C, HX OF ETOH ABUSE,  PORTAL HTN, ESOPHAGEAL VARICES, CIRRHOSIS, JIMENES ESOPHAGUS, HX OF COLON POLYPS, FATTY LIVER. Medications related to presenting condition: See chart. Taking as prescribed: States compliant. Shortness of breath: Denies. ABD distention: \"Not too bad, been worse\". ABD pain: Denies. Nausea: Denies. Vomiting: Denies. Constipation/diarrhea: Denies. Fever/chills: Denies. Recent unintended weight gain: + weight loss- states 1.5 years ago weighed 240 lbs, now weighs around 180 lbs (after paracentesis last week). Leg swelling: Denies. Jaundice/scleral icterus: Denies. SEE PORTION OF NOTE BELOW PER DR. VERNON, 8-9-22 (REVIEWED):  Reason for consultation:   Esophageal cancer T2N0Mx  Stage II   started on concurrent chemoradiation preoperatively on 5/4/21  Chemoradiation completed 6/9/21  Patient had a PET scan on 7/23/2021 which showed no evidence of recurrence  Patient has been evaluated by thoracic surgeon at [facility] Dr. Mark Fulton and also hepatologist Dr. Candelario Jeffery his case was discussed at thoracic tumor oncology board with recommendations NOT to do any surgery because of his liver failure. So surveillance recommended  He had an upper endoscopy on 8/31 which showed no residual cancer but showed Jimenes's esophagus with high-grade dysplasia.   HISTORY OF PRESENT ILLNESS:    Oncologic History:  Va Garza is a 57 y.o. male with a history of hepatitis C, status post treatment, liver cirrhosis, history of alcohol abuse was seen during initial consultation visit for newly diagnosed esophageal cancer. Patient reportedly had \"heavy drinking alcohol in about 2016 however recently in December he had 2 beers and he presented with hematemesis. On 12/29/2020 he had upper endoscopy which showed severe portal hypertension, gastropathy. The biopsy from the GE junction showed invasive adenocarcinoma. Patient had a follow-up EGD on 2/2/2021 which confirmed esophageal adenocarcinoma. Patient had endoscopic ultrasound on 2/12/2021 which showed T2 N0 MX disease with underlying esophageal varices. In the esophagus hypoechoic lesion noted in the lower esophagus penetrating into submucosa and into muscularis propria. No lymphadenopathy noted. Patient was referred to medical oncology for further recommendations. He denies any difficulty swallowing. He does not smoke he has quit smoking in 2016. He has not drank alcohol since December. He has a history of hepatitis C and he has received treatment. He lives by himself. He is accompanied by his ex-wife during today's office visit. INTERVAL HISTORY:  Patient is returning for follow-up visit and to discuss lab results, CT scan results and further recommendations. His CT scan showed no evidence of recurrence. He is has recurrent ascites and has been following with gastroenterologist.  He has been now put on once a week paracentesis. He feels much better. He denies any chest pain, shortness of breath, nausea vomiting. During this visit patient's allergy, social, medical, surgical history and medications were reviewed and updated.     Review of additional significant medical hx (see chart for additional detail, including current medications / see ROS for current s/s): ESOPHAGEAL CA, HEP. C, HX OF ETOH ABUSE, PORTAL HTN, ESOPHAGEAL VARICES, CIRRHOSIS, HTN, HLD, JIMENES ESOPHAGUS, COVID-19, SOB. EKG, 6-3-22:  Narrative & Impression    SR frequent PAC  Nonspecific ST abnormality  Abnormal ECG      Specimen Collected: 06/03/22 08:50 EDT        ECHO, 12-20-21:  CONCLUSIONS     Summary  Left ventricle is normal in size. Mild left ventricular hypertrophy. Global left ventricular systolic function is normal. Estimated LV EF 60-65  %. Left atrium is mildly dilated. No significant valvular regurgitation or stenosis seen. No significant pericardial effusion is seen. Normal aortic root dimension. Signature  ----------------------------------------------------------------------------   Electronically signed by Loraine Mcdaniel(Sonographer) on 12/21/2021 01:17   PM  ----------------------------------------------------------------------------     ----------------------------------------------------------------------------   Electronically signed by Yoni Katz(Interpreting physician) on 12/21/2021   01:25 PM    NPO status: No restrictions given per patient. Last ate and drank around 08:00 AM today. Medications taken TODAY: States everything on his medication list that he is prescribed to take in the morning. Anticoagulation status: Patient denies taking any anti-coagulants, including aspirin currently. Denies personal hx of blood clots. Denies personal hx of MRSA infection. Denies any personal or family hx of previous complications w/anesthesia. RECENT IMAGING R/T HPI   IR US GUIDED PARACENTESIS    Result Date: 8/12/2022  Successful therapeutic paracentesis. IR US GUIDED PARACENTESIS    Result Date: 8/5/2022  Successful ultrasound-guided paracentesis     IR US GUIDED PARACENTESIS    Result Date: 7/21/2022  Successful therapeutic paracentesis. CT CHEST ABDOMEN PELVIS W CONTRAST    Result Date: 7/25/2022  1. No evidence for metastatic disease. 2. No significant lymphadenopathy.  3. Redemonstration of small hiatal hernia and some distal esophageal wall thickening. 4. Large volume ascites similar to prior. Small paraesophageal and gastrohepatic varices stable.      PAST MEDICAL HISTORY     Past Medical History:   Diagnosis Date    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 ([address]     Alcoholic cirrhosis of liver with ascites ([address]     Anemia 04/13/2022    Anxiety     Arthritis     Back pain, chronic     dr. Farrukh Ortega, orthopedic, every 3-4 months, gets steroid injection    Lezama esophagus     BPH (benign prostatic hypertrophy)     Cholelithiasis     Cirrhosis ([address]     COVID-19 12/2020    pt reports he had a positive test while at [facility] in 2020, was asymptomatic    COVID-19 vaccine series completed 5/20/2021, 6/22/2021    Moderna 5/20/2021, 6/22/2021    DDD (degenerative disc disease), lumbar     Depression     Esophageal cancer ([address]     INVASIVE ADENOCARCINOMA ARISING IN TUBULAR ADENOMA WITH HIGH GRADE DYSPLASIA, ASSOCIATED WITH FOCAL INTESTINAL METAPLASIA     Esophageal varices ([address]     Fatty liver     GERD (gastroesophageal reflux disease)     Hep C w/o coma, chronic ([address]     History of alcohol abuse     6-12 beers a day; quit drinking 2019    History of blood transfusion     History of colon polyps 2016    History of tobacco abuse     [place] (hard of hearing)     Hyperlipidemia     Hypertension     Hyponatremia 07/20/2016    Port-A-Cath in place     right upper chest    Portal hypertension ([address]     Sciatica     Secondary esophageal varices ([address] 06/07/2022    Shortness of breath     Spinal stenosis     Stomach ulcer     hx of    Tubular adenoma of colon 2016, 2018    Vitamin D deficiency     Wears glasses        SURGICAL HISTORY       Past Surgical History:   Procedure Laterality Date    BUNIONECTOMY      twice on right side    BUNIONECTOMY Left     CARPAL TUNNEL RELEASE Right     COLONOSCOPY      at age 35    COLONOSCOPY  10/05/2016    polyps-pathology tubular adenoma, and abnormal looking mucosa right colon-pathology-tubular CARPAL TUNNEL [facility]/31/2017    CARPAL TUNNEL RELEASE performed by Ellen Lovell MD at [place] [address] 2020    EGD BIOPSY performed by Mounika Yu MD at [place] [address] 2021    EGD BIOPSY and spot marking performed by Jamila Garrett MD at [place] [address] 2021    ENDOSCOPIC ULTRASOUND, EGD performed by Mara Joiner MD at [place] [address]  2021    EGD DIAGNOSTIC ONLY performed by Mara Joiner MD at [facility] N/A 2021    EGD BIOPSY performed by Jamila Garrett MD at [place] [address] 2022    EGD BIOPSY performed by Jamila Garrett MD at [place] [address] N/A 04/15/2022    EGD [facility]    EGD BAND LIGATION performed by Sheree Adkins MD at [place] [address] N/A 2022    EGD ESOPHAGOGASTRODUODENOSCOPY performed by Sheree Adkins MD at [address] [place] [address] History     Socioeconomic History    Marital status: Single     Spouse name: None    Number of children: None    Years of education: None    Highest education level: None   Tobacco Use    Smoking status: Former     Packs/day: 1.00     Years: 45.00     Pack years: 45.00     Types: Cigarettes     Quit date: 2017     Years since quittin.5    Smokeless tobacco: Never   Vaping Use    Vaping Use: Never used   Substance and Sexual Activity    Alcohol use: Not Currently     Comment: Quit alcohol in 2019- heavier drinking prior to quitting    Drug use: Not Currently     Frequency: 1.0 times per week     Types: Cocaine     Comment: Cocaine- stopped spring 2016    Sexual activity: Yes     Partners: Female   Social History Narrative     in the past, retired     Social Determinants of Health     Financial Resource Strain: Low Risk     Difficulty of Paying Living Expenses: Not hard at all   Food Insecurity: No Food Insecurity    Worried About [address] in the Last Year: Never true    [facility] in the Last Year: Never true   Physical Activity: Inactive    Days of Exercise per Week: 0 days    Minutes of Exercise per Session: 0 min       REVIEW OF SYSTEMS    No Known Allergies    Current Outpatient Medications on File Prior to Encounter   Medication Sig Dispense Refill    midodrine (PROAMATINE) 5 MG tablet Take 2 tablets by mouth in the morning and 2 tablets at noon and 2 tablets before bedtime.  90 tablet 3    furosemide (LASIX) 20 MG tablet Take 1 tablet by mouth 2 times daily 60 tablet 3    spironolactone (ALDACTONE) 100 MG tablet Take 1 tablet by mouth every evening (Patient taking differently: Take 100 mg by mouth every evening Take 1 tablet at 100mg every other day, Other days take 1/2 tablet) 30 tablet 1    pantoprazole (PROTONIX) 40 MG tablet Take 1 tablet by mouth 2 times daily for 14 days 28 tablet 0    sucralfate (CARAFATE) 1 GM tablet Take 1 tablet by mouth 4 times daily for 14 days 56 tablet 0    vitamin D (CHOLECALCIFEROL) 25 MCG (1000 UT) TABS tablet Take 1,000 Units by mouth daily      ondansetron (ZOFRAN-ODT) 4 MG disintegrating tablet dissolve 1 tablet by mouth every 8 hours if needed for nausea and vomiting 10 tablet 0    lactulose (CHRONULAC) 10 GM/15ML solution take 30 milliliters by mouth three times a day 473 mL 1    nadolol (CORGARD) 20 MG tablet take 1 tablet by mouth once daily      FLUoxetine (PROZAC) 20 MG capsule Take 1 capsule by mouth daily 30 capsule 3    atorvastatin (LIPITOR) 20 MG tablet Take 1 tablet by mouth nightly 30 tablet 3    ferrous sulfate (IRON 325) 325 (65 Fe) MG tablet Take 1 tablet by mouth 2 times daily 60 tablet 5     No current facility-administered medications on file prior to encounter. Review of Systems   Constitutional:  Negative for chills and fever. HENT:  Negative for congestion, ear pain, rhinorrhea, sore throat and trouble swallowing. Respiratory:  Negative for cough, shortness of breath and wheezing. Cardiovascular:  Negative for chest pain, palpitations and leg swelling. Gastrointestinal:         See HPI. Genitourinary:  Negative for dysuria and frequency. Neurological:  Negative for dizziness and headaches. Hematological:  Does not bruise/bleed easily. GENERAL PHYSICAL EXAM     Vitals: /65   Pulse 76   Temp 97.1 °F (36.2 °C) (Infrared)   Resp 16   Ht 5' 10\" (1.778 m)   Wt 193 lb (87.5 kg)   SpO2 99%   BMI 27.69 kg/m²      GENERAL APPEARANCE:   An Dinero is 60 y.o.,  male,  overweight appearing , nourished, conscious, alert. Does not appear to be in any distress or pain at this time. Physical Exam  Constitutional:       General: He is not in acute distress. Appearance: He is well-developed. He is not ill-appearing, toxic-appearing or diaphoretic. HENT:      Head: Normocephalic. Right Ear: External ear normal.      Left Ear: External ear normal.      Nose: Nose normal.      Mouth/Throat:      Dentition: Abnormal dentition ([place]). Pharynx: No oropharyngeal exudate or posterior oropharyngeal erythema. Tonsils: No tonsillar abscesses. Eyes:      General: No scleral icterus. Right eye: No discharge. Left eye: No discharge. Pupils: Pupils are equal, round, and reactive to light. Comments: + glasses. Cardiovascular:      Rate and Rhythm: Normal rate and regular rhythm. Pulses: Intact distal pulses. Heart sounds: Normal heart sounds.    Pulmonary:      Effort: Pulmonary effort is normal. No accessory muscle usage or respiratory distress. Breath sounds: Normal breath sounds. No decreased breath sounds, wheezing, rhonchi or rales. Comments: Patient seemed slightly SOB during some of conversation at beginning of interview after walking into exam room, improved w/rest.  Abdominal:      General: Abdomen is protuberant. Bowel sounds are normal. There is distension (+ ascites). Palpations: Abdomen is soft. There is no mass. Tenderness: There is no abdominal tenderness. There is no guarding or rebound. Musculoskeletal:      Right lower leg: No swelling or tenderness. Left lower leg: No swelling or tenderness. Comments: Negative Sebastien's sign b/l. Skin:     General: Skin is warm and dry. Coloration: Skin is pale (Slightly). Skin is not jaundiced. Comments: Scattered bruising to b/l UE's, scattered scabbing to b/l UE's and LE's. Subdermal port to right upper chest, no pain w/light palpation. Neurological:      Mental Status: He is alert and oriented to person, place, and time. Psychiatric:         Behavior: Behavior is agitated (Slightly).        RECENT LAB WORK     Lab Results   Component Value Date     (L) 08/15/2022    K 4.9 08/15/2022    CL 93 (L) 08/15/2022    CO2 20 08/15/2022    BUN 8 08/15/2022    CREATININE 0.64 (L) 08/15/2022    GLUCOSE 93 08/15/2022    CALCIUM 8.7 08/15/2022    PROT 5.3 (L) 08/15/2022    LABALBU 3.2 (L) 08/15/2022    BILITOT 1.63 (H) 08/15/2022    ALKPHOS 144 (H) 08/15/2022    AST 33 08/15/2022    ALT 15 08/15/2022    LABGLOM >60 08/15/2022    GFRAA >60 08/15/2022    GLOB NOT REPORTED 08/19/2021     Lab Results   Component Value Date    WBC 7.4 08/15/2022    HGB 7.4 (L) 08/15/2022    HGB 7.4 (L) 08/15/2022    HCT 25.4 (L) 08/15/2022    HCT 25.4 (L) 08/15/2022    MCV 87.6 08/15/2022     08/15/2022     Lab Results   Component Value Date    APTT 33.9 07/21/2022     Lab Results   Component Value Date    INR 1.1 07/21/2022    INR 1.4 06/07/2022 INR 1.2 04/26/2022    PROTIME 14.6 07/21/2022    PROTIME 16.9 (H) 06/07/2022    PROTIME 15.5 (H) 04/26/2022     Lab Results   Component Value Date/Time    MG 1.5 06/04/2022 04:52 AM     PROVISIONAL DIAGNOSES / PROCEDURE:      ALCOHOLIC CIRRHOSIS OF LIVER WITH ASCITES     IR US GUIDED PARACENTESIS    Patient Active Problem List    Diagnosis Date Noted    Secondary esophageal varices ([address] 06/07/2022    Esophageal polyp 06/07/2022    Drop in hemoglobin 06/03/2022    Portal hypertension (HCC)     Shortness of breath     Ascites 04/26/2022    Acute kidney failure, unspecified ([address] 04/21/2022    Muscle weakness (generalized) 07/28/2016    Other abnormalities of gait and mobility 07/28/2016    Anemia 04/13/2022    Acute kidney injury ([address] 04/13/2022    Esophageal adenocarcinoma ([address]     Low hemoglobin 12/20/2021    Symptomatic anemia, microcytic, acute 12/20/2021    Hypotension 12/20/2021    Former smoker, 50+ pack years, quit 2016 12/20/2021    HLD (hyperlipidemia) 12/20/2021    Abnormal findings on diagnostic imaging of spine 12/14/2021    Cervical spinal stenosis 12/14/2021    Spinal stenosis of lumbar region with neurogenic claudication 12/14/2021    Severe comorbid illness 11/30/2021    Gait instability 11/30/2021    Current smoker 04/05/2021    COVID-19 02/23/2021    Anxiety 02/23/2021    Malignant neoplasm of lower third of esophagus ([address]     Hypocalcemia 12/26/2020    Hypophosphatemia 12/26/2020    Gastrointestinal hemorrhage with melena     Alcohol abuse     Altered mental status     Acute gastrointestinal bleeding 12/23/2020    Thrombocytopenia ([address] 12/23/2020    Hepatitis C virus infection resolved after antiviral drug therapy 12/23/2020    Cervical facet syndrome 11/23/2020    Lumbar facet arthropathy 08/17/2020    Elevated LFTs 08/12/2020    Seasonal allergies 08/12/2020    S/P epidural steroid injection 08/05/2020    Essential hypertension 04/24/2019    Recurrent major depressive disorder in partial remission ([address] 04/24/2019    Pure hypercholesterolemia 02/04/2019    Hypokalemia 02/04/2019    Vitamin D deficiency 09/20/2017    History of hepatitis C 09/11/2017    Ganglion cyst 05/31/2017    Carpal tunnel syndrome of right wrist 05/31/2017    Tinnitus 03/23/2017    Eustachian tube dysfunction 03/23/2017    Lumbar radiculitis 11/08/2016    Lumbar disc herniation 11/08/2016    Gynecomastia, male 10/26/2016    Depression 10/13/2016    Vertebrogenic low back pain 10/06/2016    DDD (degenerative disc disease), lumbar 10/06/2016    Hepatic cirrhosis ([address] 09/15/2016    Psychophysiologic insomnia 09/14/2016    Cirrhosis ([address]     Hep C w/o coma, chronic (HCC)     Fatty liver     Calculus of gallbladder without cholecystitis 08/10/2016    Chronic viral hepatitis B without delta agent and without coma ([address] 07/22/2016    Hypomagnesemia     Alcohol withdrawal syndrome without complication ([address] 08/14/9920    Cervical radicular pain 01/04/2016    Tubular adenoma of colon 01/01/2016    History of colon polyps 01/01/2016    Back pain, chronic 04/19/2012    Hearing difficulty 04/19/2012    GERD (gastroesophageal reflux disease) 04/19/2012           Shaneomar Quiroz, APRN - CNP on 8/19/2022 at 12:18 PM

## 2022-08-22 ENCOUNTER — HOSPITAL ENCOUNTER (OUTPATIENT)
Dept: PHYSICAL THERAPY | Age: 62
Setting detail: THERAPIES SERIES
Discharge: HOME | End: 2022-08-22
Payer: MEDICARE

## 2022-08-22 ENCOUNTER — HOSPITAL ENCOUNTER (OUTPATIENT)
Age: 62
Setting detail: SPECIMEN
Discharge: HOME | End: 2022-08-22

## 2022-08-22 DIAGNOSIS — K70.31 ASCITES DUE TO ALCOHOLIC CIRRHOSIS (HCC): ICD-10-CM

## 2022-08-22 LAB
ABSOLUTE EOS #: 0.07 K/UL (ref 0–0.4)
ABSOLUTE IMMATURE GRANULOCYTE: 0 K/UL (ref 0–0.3)
ABSOLUTE LYMPH #: 0.29 K/UL (ref 1–4.8)
ABSOLUTE MONO #: 1.1 K/UL (ref 0.1–0.8)
BASOPHILS # BLD: 0 % (ref 0–2)
BASOPHILS ABSOLUTE: 0 K/UL (ref 0–0.2)
EOSINOPHILS RELATIVE PERCENT: 1 % (ref 1–4)
HCT VFR BLD CALC: 25.8 % (ref 40.7–50.3)
HEMOGLOBIN: 6.9 G/DL (ref 13–17)
IMMATURE GRANULOCYTES: 0 %
LYMPHOCYTES # BLD: 4 % (ref 24–44)
MCH RBC QN AUTO: 23.9 PG (ref 25.2–33.5)
MCHC RBC AUTO-ENTMCNC: 26.7 G/DL (ref 28.4–34.8)
MCV RBC AUTO: 89.3 FL (ref 82.6–102.9)
MONOCYTES # BLD: 15 % (ref 1–7)
MORPHOLOGY: ABNORMAL
MORPHOLOGY: ABNORMAL
NRBC AUTOMATED: 0 PER 100 WBC
PDW BLD-RTO: 16.8 % (ref 11.8–14.4)
PLATELET # BLD: 183 K/UL (ref 138–453)
PMV BLD AUTO: 10.1 FL (ref 8.1–13.5)
RBC # BLD: 2.89 M/UL (ref 4.21–5.77)
SEG NEUTROPHILS: 80 % (ref 36–66)
SEGMENTED NEUTROPHILS ABSOLUTE COUNT: 5.84 K/UL (ref 1.8–7.7)
WBC # BLD: 7.3 K/UL (ref 3.5–11.3)

## 2022-08-22 NOTE — FLOWSHEET NOTE
[x] [facility]) [facility] & Therapy  [address]  [place]: [phone]   F: [phone]     Physical Therapy Cancel/No Show note    Date: 2022  Patient: Nilson Ramírez  :   MRN: [id number]    Visit Count: 3/12  Cancels/No Shows to date:     For today's appointment patient:    [x]  Cancelled    [] Rescheduled appointment    [] No-show     Reason given by patient:    []  Patient ill    []  Conflicting appointment    [] No transportation      [] Conflict with work    [x] No reason given    [] Weather related    [] [id number]    [] Other:      Comments:        [x] Next appointment was confirmed    Electronically signed by: Thomas Singer PTA

## 2022-08-23 LAB
ALBUMIN SERPL-MCNC: 3.3 G/DL (ref 3.5–5.2)
ALBUMIN/GLOBULIN RATIO: 1.6 (ref 1–2.5)
ALP BLD-CCNC: 133 U/L (ref 40–129)
ALT SERPL-CCNC: 14 U/L (ref 5–41)
ANION GAP SERPL CALCULATED.3IONS-SCNC: 16 MMOL/L (ref 9–17)
AST SERPL-CCNC: 31 U/L
BILIRUB SERPL-MCNC: 1.33 MG/DL (ref 0.3–1.2)
BUN BLDV-MCNC: 11 MG/DL (ref 8–23)
CALCIUM SERPL-MCNC: 8.2 MG/DL (ref 8.6–10.4)
CHLORIDE BLD-SCNC: 93 MMOL/L (ref 98–107)
CO2: 17 MMOL/L (ref 20–31)
CREAT SERPL-MCNC: 0.77 MG/DL (ref 0.7–1.2)
GFR AFRICAN AMERICAN: >60 ML/MIN
GFR NON-AFRICAN AMERICAN: >60 ML/MIN
GFR SERPL CREATININE-BSD FRML MDRD: ABNORMAL ML/MIN/{1.73_M2}
GLUCOSE BLD-MCNC: 123 MG/DL (ref 70–99)
POTASSIUM SERPL-SCNC: 5 MMOL/L (ref 3.7–5.3)
SODIUM BLD-SCNC: 126 MMOL/L (ref 135–144)
TOTAL PROTEIN: 5.4 G/DL (ref 6.4–8.3)

## 2022-08-24 ENCOUNTER — TELEPHONE (OUTPATIENT)
Dept: GASTROENTEROLOGY | Age: 62
End: 2022-08-24

## 2022-08-24 ENCOUNTER — HOSPITAL ENCOUNTER (OUTPATIENT)
Age: 62
Setting detail: SPECIMEN
Discharge: HOME | End: 2022-08-24

## 2022-08-24 ENCOUNTER — TELEPHONE (OUTPATIENT)
Dept: PRIMARY CARE CLINIC | Age: 62
End: 2022-08-24

## 2022-08-24 ENCOUNTER — HOSPITAL ENCOUNTER (OUTPATIENT)
Dept: PHYSICAL THERAPY | Age: 62
Setting detail: THERAPIES SERIES
Discharge: HOME | End: 2022-08-24
Payer: MEDICARE

## 2022-08-24 DIAGNOSIS — C15.5 MALIGNANT NEOPLASM OF LOWER THIRD OF ESOPHAGUS (HCC): ICD-10-CM

## 2022-08-24 DIAGNOSIS — K70.31 ASCITES DUE TO ALCOHOLIC CIRRHOSIS (HCC): Primary | ICD-10-CM

## 2022-08-24 DIAGNOSIS — D64.9 ANEMIA, UNSPECIFIED TYPE: ICD-10-CM

## 2022-08-24 LAB
ABSOLUTE EOS #: 0.08 K/UL (ref 0–0.4)
ABSOLUTE IMMATURE GRANULOCYTE: 0 K/UL (ref 0–0.3)
ABSOLUTE LYMPH #: 0.47 K/UL (ref 1–4.8)
ABSOLUTE MONO #: 1.11 K/UL (ref 0.1–0.8)
ALBUMIN SERPL-MCNC: 3.3 G/DL (ref 3.5–5.2)
ALBUMIN/GLOBULIN RATIO: 1.5 (ref 1–2.5)
ALP BLD-CCNC: 135 U/L (ref 40–129)
ALT SERPL-CCNC: 18 U/L (ref 5–41)
ANION GAP SERPL CALCULATED.3IONS-SCNC: 13 MMOL/L (ref 9–17)
AST SERPL-CCNC: 40 U/L
BASOPHILS # BLD: 2 % (ref 0–2)
BASOPHILS ABSOLUTE: 0.16 K/UL (ref 0–0.2)
BILIRUB SERPL-MCNC: 1.66 MG/DL (ref 0.3–1.2)
BUN BLDV-MCNC: 9 MG/DL (ref 8–23)
CALCIUM SERPL-MCNC: 8.4 MG/DL (ref 8.6–10.4)
CHLORIDE BLD-SCNC: 94 MMOL/L (ref 98–107)
CO2: 17 MMOL/L (ref 20–31)
CREAT SERPL-MCNC: 0.64 MG/DL (ref 0.7–1.2)
EOSINOPHILS RELATIVE PERCENT: 1 % (ref 1–4)
GFR AFRICAN AMERICAN: >60 ML/MIN
GFR NON-AFRICAN AMERICAN: >60 ML/MIN
GFR SERPL CREATININE-BSD FRML MDRD: ABNORMAL ML/MIN/{1.73_M2}
GLUCOSE BLD-MCNC: 101 MG/DL (ref 70–99)
HCT VFR BLD CALC: 25.2 % (ref 40.7–50.3)
HCT VFR BLD CALC: 25.2 % (ref 40.7–50.3)
HEMOGLOBIN: 6.7 G/DL (ref 13–17)
HEMOGLOBIN: 6.7 G/DL (ref 13–17)
IMMATURE GRANULOCYTES: 0 %
LYMPHOCYTES # BLD: 6 % (ref 24–44)
MCH RBC QN AUTO: 23.6 PG (ref 25.2–33.5)
MCHC RBC AUTO-ENTMCNC: 26.6 G/DL (ref 28.4–34.8)
MCV RBC AUTO: 88.7 FL (ref 82.6–102.9)
MONOCYTES # BLD: 14 % (ref 1–7)
MORPHOLOGY: ABNORMAL
MORPHOLOGY: ABNORMAL
NRBC AUTOMATED: 0.3 PER 100 WBC
PDW BLD-RTO: 16.7 % (ref 11.8–14.4)
PLATELET # BLD: 210 K/UL (ref 138–453)
PMV BLD AUTO: 9.4 FL (ref 8.1–13.5)
POTASSIUM SERPL-SCNC: 5.4 MMOL/L (ref 3.7–5.3)
RBC # BLD: 2.84 M/UL (ref 4.21–5.77)
SEG NEUTROPHILS: 77 % (ref 36–66)
SEGMENTED NEUTROPHILS ABSOLUTE COUNT: 6.08 K/UL (ref 1.8–7.7)
SODIUM BLD-SCNC: 124 MMOL/L (ref 135–144)
TOTAL PROTEIN: 5.5 G/DL (ref 6.4–8.3)
WBC # BLD: 7.9 K/UL (ref 3.5–11.3)

## 2022-08-24 NOTE — FLOWSHEET NOTE
[x] [facility]) - [facility] & Therapy  [address]  [place]: [phone]   F: [phone]     Physical Therapy Cancel/No Show note    Date: 2022  Patient: Sussy Zamora  :   MRN: [id number]    Visit Count: 3/12  Cancels/No Shows to date: 3/0    For today's appointment patient:    [x]  Cancelled    [] Rescheduled appointment    [] No-show     Reason given by patient:    []  Patient ill    []  Conflicting appointment    [] No transportation      [] Conflict with work    [] No reason given    [] Weather related    [] COVID-19    [x] Other:   Patient arrived to therapy but upon getting patient from the lobby he says he is not feeling well and was told his Hbg is low based on blood work Monday and has to get blood work again. Says he feels weak and wobbly and deferred PT this date. PT advised against PT due to patients symptoms.     Comments:        [x] Next appointment was confirmed    Electronically signed by: Jay Sorenson, PT

## 2022-08-24 NOTE — TELEPHONE ENCOUNTER
Pt called asking if you would review his labs that he had done Monday?  His hemoglobin is lower, please advise

## 2022-08-24 NOTE — TELEPHONE ENCOUNTER
Patient hemoglobin below 7 with dropping I would recommend a blood transfusion. This should be done through Dr. Karely Bynum who ordered this test. Please see if he has been able to contact their office.

## 2022-08-25 ENCOUNTER — TELEPHONE (OUTPATIENT)
Dept: INFUSION THERAPY | Age: 62
End: 2022-08-25

## 2022-08-25 ENCOUNTER — HOSPITAL ENCOUNTER (OUTPATIENT)
Age: 62
Discharge: HOME | End: 2022-08-25
Payer: MEDICARE

## 2022-08-25 DIAGNOSIS — D63.8 ANEMIA OF CHRONIC DISEASE: Primary | ICD-10-CM

## 2022-08-25 DIAGNOSIS — D69.6 THROMBOCYTOPENIA (HCC): ICD-10-CM

## 2022-08-25 PROCEDURE — 86900 BLOOD TYPING SEROLOGIC ABO: CPT

## 2022-08-25 PROCEDURE — 86850 RBC ANTIBODY SCREEN: CPT

## 2022-08-25 PROCEDURE — 86901 BLOOD TYPING SEROLOGIC RH(D): CPT

## 2022-08-25 PROCEDURE — 36415 COLL VENOUS BLD VENIPUNCTURE: CPT

## 2022-08-25 PROCEDURE — 86920 COMPATIBILITY TEST SPIN: CPT

## 2022-08-25 RX ORDER — SODIUM CHLORIDE 9 MG/ML
INJECTION, SOLUTION INTRAVENOUS PRN
Status: CANCELLED | OUTPATIENT
Start: 2022-08-25

## 2022-08-25 NOTE — TELEPHONE ENCOUNTER
Pt's ex-spouse, Eilleen Crimes, called back. She states she tried to get a hold of pt as well, but he is not home. She set up appt for transfusion tomorrow at 9 with T&C today. She states she will make sure he is aware of this and will call if anything changes. Pt added to schedule and orders placed.

## 2022-08-25 NOTE — TELEPHONE ENCOUNTER
Pt had lab recheck and it has dropped even lower. Per [place] pt needs infusion of 2 units of PRBC.

## 2022-08-25 NOTE — TELEPHONE ENCOUNTER
Received a call from this patient. Pt states that he needs a transfusion and [place] office told him it was OK to come to [facility]. This office received no orders or phone calls from [facility]. Pt already has appt scheduled at [facility] tomorrow morning. [facility] does not have chair available for transfusion tomorrow/fully booked already. Pt advised to keep appt in [facility] tomorrow for transfusion.

## 2022-08-25 NOTE — TELEPHONE ENCOUNTER
Pt came into office for T&C. Writer spoke to pt; he states [facility] told him they were booked and could not transfuse him tomorrow. He would like to be put back on the schedule at 0900 . Pt placed back on schedule and labs were drawn. Infusion notified.

## 2022-08-25 NOTE — TELEPHONE ENCOUNTER
Hgb 6.7. Per Dr. Nick Diez, order 1 unit PRBC for today or tomorrow. Attempted to call pt and pt's ex-spouse, Laurel Garcia, but had to leave detailed VM on each line. Will await a call back.

## 2022-08-25 NOTE — TELEPHONE ENCOUNTER
Pt called back stating he has a paracentesis scheduled at [place] tomorrow at noon, and he would like the blood transfusion there as well. Orders for 1 Unit PRBC faxed to [place] infusion and appt cancelled with us.

## 2022-08-25 NOTE — PROGRESS NOTES
Will order type and screen  Hemoglobin worse. Staff trying to get a hold of the hem/onc office re: if they want to order a blood transfusion or not.

## 2022-08-26 ENCOUNTER — HOSPITAL ENCOUNTER (OUTPATIENT)
Dept: INFUSION THERAPY | Age: 62
Discharge: HOME | End: 2022-08-26
Payer: MEDICARE

## 2022-08-26 ENCOUNTER — HOSPITAL ENCOUNTER (OUTPATIENT)
Dept: INTERVENTIONAL RADIOLOGY/VASCULAR | Age: 62
Discharge: HOME | End: 2022-08-28
Payer: MEDICARE

## 2022-08-26 VITALS
WEIGHT: 178.5 LBS | BODY MASS INDEX: 25.61 KG/M2 | SYSTOLIC BLOOD PRESSURE: 122 MMHG | TEMPERATURE: 96.8 F | HEART RATE: 67 BPM | DIASTOLIC BLOOD PRESSURE: 63 MMHG | RESPIRATION RATE: 18 BRPM | OXYGEN SATURATION: 100 %

## 2022-08-26 VITALS
TEMPERATURE: 97.7 F | DIASTOLIC BLOOD PRESSURE: 77 MMHG | HEART RATE: 61 BPM | RESPIRATION RATE: 18 BRPM | SYSTOLIC BLOOD PRESSURE: 128 MMHG

## 2022-08-26 DIAGNOSIS — K70.31 ALCOHOLIC CIRRHOSIS OF LIVER WITH ASCITES (HCC): ICD-10-CM

## 2022-08-26 DIAGNOSIS — C15.9 ESOPHAGEAL ADENOCARCINOMA (HCC): Primary | ICD-10-CM

## 2022-08-26 PROCEDURE — 7100000010 HC PHASE II RECOVERY - FIRST 15 MIN

## 2022-08-26 PROCEDURE — P9047 ALBUMIN (HUMAN), 25%, 50ML: HCPCS | Performed by: RADIOLOGY

## 2022-08-26 PROCEDURE — 2709999900 IR US GUIDED PARACENTESIS

## 2022-08-26 PROCEDURE — 2580000003 HC RX 258: Performed by: INTERNAL MEDICINE

## 2022-08-26 PROCEDURE — P9016 RBC LEUKOCYTES REDUCED: HCPCS

## 2022-08-26 PROCEDURE — 2580000003 HC RX 258: Performed by: RADIOLOGY

## 2022-08-26 PROCEDURE — 6360000002 HC RX W HCPCS: Performed by: INTERNAL MEDICINE

## 2022-08-26 PROCEDURE — 36430 TRANSFUSION BLD/BLD COMPNT: CPT

## 2022-08-26 PROCEDURE — 7100000011 HC PHASE II RECOVERY - ADDTL 15 MIN

## 2022-08-26 PROCEDURE — 49083 ABD PARACENTESIS W/IMAGING: CPT

## 2022-08-26 PROCEDURE — 6360000002 HC RX W HCPCS: Performed by: RADIOLOGY

## 2022-08-26 PROCEDURE — 86900 BLOOD TYPING SEROLOGIC ABO: CPT

## 2022-08-26 RX ORDER — HEPARIN SODIUM (PORCINE) LOCK FLUSH IV SOLN 100 UNIT/ML 100 UNIT/ML
500 SOLUTION INTRAVENOUS PRN
OUTPATIENT
Start: 2022-08-26

## 2022-08-26 RX ORDER — SODIUM CHLORIDE 9 MG/ML
25 INJECTION, SOLUTION INTRAVENOUS PRN
OUTPATIENT
Start: 2022-08-26

## 2022-08-26 RX ORDER — SODIUM CHLORIDE 9 MG/ML
INJECTION, SOLUTION INTRAVENOUS PRN
Status: DISCONTINUED | OUTPATIENT
Start: 2022-08-26 | End: 2022-08-29 | Stop reason: HOSPADM

## 2022-08-26 RX ORDER — SODIUM CHLORIDE 0.9 % (FLUSH) 0.9 %
5-40 SYRINGE (ML) INJECTION PRN
Status: DISCONTINUED | OUTPATIENT
Start: 2022-08-26 | End: 2022-08-29 | Stop reason: HOSPADM

## 2022-08-26 RX ORDER — ONDANSETRON 2 MG/ML
4 INJECTION INTRAMUSCULAR; INTRAVENOUS ONCE
Status: COMPLETED | OUTPATIENT
Start: 2022-08-26 | End: 2022-08-26

## 2022-08-26 RX ORDER — ALBUMIN (HUMAN) 12.5 G/50ML
25 SOLUTION INTRAVENOUS ONCE
Status: COMPLETED | OUTPATIENT
Start: 2022-08-26 | End: 2022-08-26

## 2022-08-26 RX ORDER — SODIUM CHLORIDE 9 MG/ML
INJECTION, SOLUTION INTRAVENOUS PRN
Status: DISCONTINUED | OUTPATIENT
Start: 2022-08-26 | End: 2022-08-27 | Stop reason: HOSPADM

## 2022-08-26 RX ORDER — HEPARIN SODIUM (PORCINE) LOCK FLUSH IV SOLN 100 UNIT/ML 100 UNIT/ML
500 SOLUTION INTRAVENOUS PRN
Status: DISCONTINUED | OUTPATIENT
Start: 2022-08-26 | End: 2022-08-27 | Stop reason: HOSPADM

## 2022-08-26 RX ORDER — SODIUM CHLORIDE 0.9 % (FLUSH) 0.9 %
5-40 SYRINGE (ML) INJECTION PRN
Status: DISCONTINUED | OUTPATIENT
Start: 2022-08-26 | End: 2022-08-27 | Stop reason: HOSPADM

## 2022-08-26 RX ORDER — SODIUM CHLORIDE 0.9 % (FLUSH) 0.9 %
5-40 SYRINGE (ML) INJECTION PRN
OUTPATIENT
Start: 2022-08-26

## 2022-08-26 RX ORDER — SODIUM CHLORIDE 9 MG/ML
INJECTION, SOLUTION INTRAVENOUS CONTINUOUS
Status: DISCONTINUED | OUTPATIENT
Start: 2022-08-26 | End: 2022-08-29 | Stop reason: HOSPADM

## 2022-08-26 RX ORDER — HEPARIN SODIUM (PORCINE) LOCK FLUSH IV SOLN 100 UNIT/ML 100 UNIT/ML
500 SOLUTION INTRAVENOUS
Status: COMPLETED | OUTPATIENT
Start: 2022-08-26 | End: 2022-08-26

## 2022-08-26 RX ORDER — NADOLOL 20 MG/1
TABLET ORAL
Qty: 30 TABLET | Refills: 2 | Status: SHIPPED | OUTPATIENT
Start: 2022-08-26

## 2022-08-26 RX ORDER — SODIUM CHLORIDE 0.9 % (FLUSH) 0.9 %
5-40 SYRINGE (ML) INJECTION EVERY 12 HOURS SCHEDULED
Status: DISCONTINUED | OUTPATIENT
Start: 2022-08-26 | End: 2022-08-29 | Stop reason: HOSPADM

## 2022-08-26 RX ORDER — ACETAMINOPHEN 325 MG/1
650 TABLET ORAL EVERY 4 HOURS PRN
Status: DISCONTINUED | OUTPATIENT
Start: 2022-08-26 | End: 2022-08-29 | Stop reason: HOSPADM

## 2022-08-26 RX ADMIN — ALBUMIN (HUMAN) 25 G: 0.25 INJECTION, SOLUTION INTRAVENOUS at 13:26

## 2022-08-26 RX ADMIN — HEPARIN 500 UNITS: 100 SYRINGE at 11:29

## 2022-08-26 RX ADMIN — ONDANSETRON 4 MG: 2 INJECTION INTRAMUSCULAR; INTRAVENOUS at 11:01

## 2022-08-26 RX ADMIN — SODIUM CHLORIDE: 9 INJECTION, SOLUTION INTRAVENOUS at 12:37

## 2022-08-26 RX ADMIN — ALBUMIN (HUMAN) 25 G: 0.25 INJECTION, SOLUTION INTRAVENOUS at 13:07

## 2022-08-26 RX ADMIN — Medication 500 UNITS: at 14:14

## 2022-08-26 RX ADMIN — SODIUM CHLORIDE, PRESERVATIVE FREE 10 ML: 5 INJECTION INTRAVENOUS at 11:29

## 2022-08-26 RX ADMIN — ALBUMIN (HUMAN) 25 G: 0.25 INJECTION, SOLUTION INTRAVENOUS at 13:44

## 2022-08-26 ASSESSMENT — PAIN - FUNCTIONAL ASSESSMENT: PAIN_FUNCTIONAL_ASSESSMENT: NONE - DENIES PAIN

## 2022-08-26 NOTE — DISCHARGE INSTRUCTIONS
DISCHARGE INSTRUCTIONS FOR PARACENTESIS    In order to continue your care at home, please follow the instructions below. Medications    Use over-the-counter pain medication as directed on bottle for pain control    Post Procedure Site/Care/Activity  For up to 2 days after the procedure, you may have a small amount of clear fluid coming out of the site where the needle was inserted, especially if you had a lot of fluid removed. You may need to change the bandage on the site. Do not lie totally flat until morning. You can do your normal activities after the procedure, unless instructed differently. Call your doctor or seek immediate medical care if:   You have symptoms of infection, such as increased pain, swelling, warmth, or redness, red streaks or pus. An oral temperature (by mouth) is 101 degrees or higher (fever), chills, fever. You are dizzy or lightheaded, or you feel like you may faint. You have new or worse belly pain. Watch closely for changes in your health, and be sure to contact your doctor if:   Fluid builds up in your belly again. You do not get better as expected.       IF YOU CANNOT REACH THE DOCTOR, GO TO THE NEAREST [address] OR CALL 911    Phone: Interventional Radiology   [phone] Dr. Stephenie Odom  After hours Radiology   [phone]     8.6 Liters removed

## 2022-08-26 NOTE — PROGRESS NOTES
Patient here for 1 unit PRBC's. Consent form signed/ on file. Transfusion complete without incident. Patient was discharged in stable condition.

## 2022-08-26 NOTE — PROGRESS NOTES
Patient tolerated well without distress. 8.6 L of clear, yellow fluid removed. Area cleansed & dry dressing to site. Transport notified to take patient back to [facility]. JERI Serrano updated.

## 2022-08-26 NOTE — FLOWSHEET NOTE
SPIRITUAL CARE PROGRESS NOTE: Outpatient Oncology Care at [facility] SERVICES    Spiritual Assessment: Writer assisted Patient in a wheelchair from the infusion clinic to the front entrance of the hospital. Patient appeared fatigued and did not speak much. He responded to writer's questions about his work. He acknowledged the impact of his work on him. He was winded and winced as he got out of the wheelchair to go to his car. Intervention: Writer escorted Pt in a wheelchair from the infusion clinic to the front entrance. Writer asked about Pt's profession. Writer affirmed Pt's strengths. Writer offered empathy and words of support and encouragement. Outcome: Patient shared how he was doing. Patient acknowledged the impact of his health on him. Patient thanked writer for the assistance. Plan: Chaplains will remain available to provide emotional and spiritual support as needed.        08/26/22 1550   Encounter Summary   Encounter Overview/Reason  Spiritual/Emotional Needs   Service Provided For: Patient   Referral/Consult From: [address] Family members   Last Encounter  05/04/21   Complexity of Encounter Low   Begin Time 1150   End Time  1200   Total Time Calculated 10 min   Encounter    Type Follow up   Assessment/Intervention/Outcome   Assessment Passive   Intervention Sustaining Presence/Ministry of presence   Outcome Expressed Gratitude   Plan and Referrals   Plan/Referrals Continue Support (comment)     Electronically signed by Gume Cano, Oncology Outpatient [facility] [id number] [facility]  [phone]  8/26/2022  3:52 PM

## 2022-08-27 LAB
ABO/RH: NORMAL
ANTIBODY SCREEN: NEGATIVE
ARM BAND NUMBER: NORMAL
BLD PROD TYP BPU: NORMAL
BLOOD BANK BLOOD PRODUCT EXPIRATION DATE: NORMAL
BLOOD BANK ISBT PRODUCT BLOOD TYPE: 5100
BLOOD BANK PRODUCT CODE: NORMAL
BLOOD BANK UNIT TYPE AND RH: NORMAL
BPU ID: NORMAL
CROSSMATCH RESULT: NORMAL
DISPENSE STATUS BLOOD BANK: NORMAL
EXPIRATION DATE: NORMAL
TRANSFUSION STATUS: NORMAL
UNIT DIVISION: 0
UNIT ISSUE DATE/TIME: NORMAL

## 2022-08-29 ENCOUNTER — HOSPITAL ENCOUNTER (OUTPATIENT)
Age: 62
Setting detail: SPECIMEN
Discharge: HOME | End: 2022-08-29

## 2022-08-29 DIAGNOSIS — K70.31 ASCITES DUE TO ALCOHOLIC CIRRHOSIS (HCC): ICD-10-CM

## 2022-08-29 LAB
ABSOLUTE EOS #: 0.08 K/UL (ref 0–0.4)
ABSOLUTE IMMATURE GRANULOCYTE: 0 K/UL (ref 0–0.3)
ABSOLUTE LYMPH #: 0.5 K/UL (ref 1–4.8)
ABSOLUTE MONO #: 1.01 K/UL (ref 0.1–0.8)
ALBUMIN SERPL-MCNC: 2.9 G/DL (ref 3.5–5.2)
ALBUMIN/GLOBULIN RATIO: 1.3 (ref 1–2.5)
ALP BLD-CCNC: 126 U/L (ref 40–129)
ALT SERPL-CCNC: 19 U/L (ref 5–41)
ANION GAP SERPL CALCULATED.3IONS-SCNC: 14 MMOL/L (ref 9–17)
AST SERPL-CCNC: 62 U/L
BASOPHILS # BLD: 0 % (ref 0–2)
BASOPHILS ABSOLUTE: 0 K/UL (ref 0–0.2)
BILIRUB SERPL-MCNC: 1.27 MG/DL (ref 0.3–1.2)
BUN BLDV-MCNC: 14 MG/DL (ref 8–23)
CALCIUM SERPL-MCNC: 7.9 MG/DL (ref 8.6–10.4)
CHLORIDE BLD-SCNC: 95 MMOL/L (ref 98–107)
CO2: 17 MMOL/L (ref 20–31)
CREAT SERPL-MCNC: 0.8 MG/DL (ref 0.7–1.2)
EOSINOPHILS RELATIVE PERCENT: 1 % (ref 1–4)
GFR AFRICAN AMERICAN: >60 ML/MIN
GFR NON-AFRICAN AMERICAN: >60 ML/MIN
GFR SERPL CREATININE-BSD FRML MDRD: ABNORMAL ML/MIN/{1.73_M2}
GLUCOSE BLD-MCNC: 101 MG/DL (ref 70–99)
HCT VFR BLD CALC: 26.7 % (ref 40.7–50.3)
HEMOGLOBIN: 7.6 G/DL (ref 13–17)
IMMATURE GRANULOCYTES: 0 %
LYMPHOCYTES # BLD: 6 % (ref 24–44)
MCH RBC QN AUTO: 25.4 PG (ref 25.2–33.5)
MCHC RBC AUTO-ENTMCNC: 28.5 G/DL (ref 28.4–34.8)
MCV RBC AUTO: 89.3 FL (ref 82.6–102.9)
MONOCYTES # BLD: 12 % (ref 1–7)
MORPHOLOGY: ABNORMAL
NRBC AUTOMATED: 0 PER 100 WBC
PDW BLD-RTO: 17.4 % (ref 11.8–14.4)
PLATELET # BLD: 205 K/UL (ref 138–453)
PMV BLD AUTO: 10 FL (ref 8.1–13.5)
POTASSIUM SERPL-SCNC: 4.5 MMOL/L (ref 3.7–5.3)
RBC # BLD: 2.99 M/UL (ref 4.21–5.77)
SEG NEUTROPHILS: 81 % (ref 36–66)
SEGMENTED NEUTROPHILS ABSOLUTE COUNT: 6.81 K/UL (ref 1.8–7.7)
SODIUM BLD-SCNC: 126 MMOL/L (ref 135–144)
TOTAL PROTEIN: 5.2 G/DL (ref 6.4–8.3)
WBC # BLD: 8.4 K/UL (ref 3.5–11.3)

## 2022-09-01 RX ORDER — SODIUM CHLORIDE 0.9 % (FLUSH) 0.9 %
5-40 SYRINGE (ML) INJECTION EVERY 12 HOURS SCHEDULED
Status: CANCELLED | OUTPATIENT
Start: 2022-09-01

## 2022-09-01 RX ORDER — SODIUM CHLORIDE 0.9 % (FLUSH) 0.9 %
5-40 SYRINGE (ML) INJECTION PRN
Status: CANCELLED | OUTPATIENT
Start: 2022-09-01

## 2022-09-01 RX ORDER — SODIUM CHLORIDE 9 MG/ML
INJECTION, SOLUTION INTRAVENOUS PRN
Status: CANCELLED | OUTPATIENT
Start: 2022-09-01

## 2022-09-02 ENCOUNTER — HOSPITAL ENCOUNTER (OUTPATIENT)
Dept: INTERVENTIONAL RADIOLOGY/VASCULAR | Age: 62
Discharge: HOME | End: 2022-09-04
Payer: MEDICARE

## 2022-09-02 VITALS
HEART RATE: 80 BPM | HEIGHT: 70 IN | DIASTOLIC BLOOD PRESSURE: 70 MMHG | BODY MASS INDEX: 27.09 KG/M2 | WEIGHT: 189.2 LBS | RESPIRATION RATE: 16 BRPM | TEMPERATURE: 97.3 F | SYSTOLIC BLOOD PRESSURE: 132 MMHG | OXYGEN SATURATION: 100 %

## 2022-09-02 DIAGNOSIS — K70.31 ALCOHOLIC CIRRHOSIS OF LIVER WITH ASCITES (HCC): ICD-10-CM

## 2022-09-02 LAB
INR BLD: 1.3
PARTIAL THROMBOPLASTIN TIME: 44.7 SEC (ref 24–36)
PLATELET # BLD: 259 K/UL (ref 150–450)
PROTHROMBIN TIME: 16.5 SEC (ref 11.8–14.6)

## 2022-09-02 PROCEDURE — 2580000003 HC RX 258: Performed by: RADIOLOGY

## 2022-09-02 PROCEDURE — 36415 COLL VENOUS BLD VENIPUNCTURE: CPT

## 2022-09-02 PROCEDURE — 49083 ABD PARACENTESIS W/IMAGING: CPT

## 2022-09-02 PROCEDURE — 85730 THROMBOPLASTIN TIME PARTIAL: CPT

## 2022-09-02 PROCEDURE — 85610 PROTHROMBIN TIME: CPT

## 2022-09-02 PROCEDURE — 6360000002 HC RX W HCPCS: Performed by: RADIOLOGY

## 2022-09-02 PROCEDURE — 2709999900 IR US GUIDED PARACENTESIS

## 2022-09-02 PROCEDURE — 7100000011 HC PHASE II RECOVERY - ADDTL 15 MIN

## 2022-09-02 PROCEDURE — P9047 ALBUMIN (HUMAN), 25%, 50ML: HCPCS | Performed by: RADIOLOGY

## 2022-09-02 PROCEDURE — 85049 AUTOMATED PLATELET COUNT: CPT

## 2022-09-02 PROCEDURE — 7100000010 HC PHASE II RECOVERY - FIRST 15 MIN

## 2022-09-02 RX ORDER — SODIUM CHLORIDE 0.9 % (FLUSH) 0.9 %
5-40 SYRINGE (ML) INJECTION EVERY 12 HOURS SCHEDULED
Status: DISCONTINUED | OUTPATIENT
Start: 2022-09-02 | End: 2022-09-05 | Stop reason: HOSPADM

## 2022-09-02 RX ORDER — SODIUM CHLORIDE 0.9 % (FLUSH) 0.9 %
5-40 SYRINGE (ML) INJECTION PRN
Status: DISCONTINUED | OUTPATIENT
Start: 2022-09-02 | End: 2022-09-05 | Stop reason: HOSPADM

## 2022-09-02 RX ORDER — HEPARIN SODIUM (PORCINE) LOCK FLUSH IV SOLN 100 UNIT/ML 100 UNIT/ML
500 SOLUTION INTRAVENOUS
Status: COMPLETED | OUTPATIENT
Start: 2022-09-02 | End: 2022-09-02

## 2022-09-02 RX ORDER — SODIUM CHLORIDE 9 MG/ML
INJECTION, SOLUTION INTRAVENOUS PRN
Status: DISCONTINUED | OUTPATIENT
Start: 2022-09-02 | End: 2022-09-05 | Stop reason: HOSPADM

## 2022-09-02 RX ORDER — ALBUMIN (HUMAN) 12.5 G/50ML
75 SOLUTION INTRAVENOUS ONCE
Status: COMPLETED | OUTPATIENT
Start: 2022-09-02 | End: 2022-09-02

## 2022-09-02 RX ADMIN — ALBUMIN (HUMAN) 75 G: 0.25 INJECTION, SOLUTION INTRAVENOUS at 14:26

## 2022-09-02 RX ADMIN — Medication 500 UNITS: at 15:46

## 2022-09-02 RX ADMIN — SODIUM CHLORIDE, PRESERVATIVE FREE 10 ML: 5 INJECTION INTRAVENOUS at 15:46

## 2022-09-02 RX ADMIN — SODIUM CHLORIDE: 9 INJECTION, SOLUTION INTRAVENOUS at 12:21

## 2022-09-02 NOTE — H&P (VIEW-ONLY)
HISTORY and [address]       NAME:  Va Garza  MRN: [id number]   YOB: 1960   Date: 9/2/2022   Age: 60 y.o. Gender: male     H&P Update Note    H&P from 8/19/2022 reviewed and updated. Patient examined. INTERVAL HISTORY:     Patient is feeling well today, denies any fever/chills, chest pain, shortness of breath. No interval changes. Pt had the last Paracenteses  in 8/26/2022 with A total of 8.6 L of clear yellow fluid was removed. Supplemental albumin was given intravenously. Patient was given supplement IV albumin. Patient reports significant relief of symptoms. Pt denies any abdominal pain , . He still has SOB with walk or lying down but improved by sitting in chair. Pt denies any swelling lower legs. Pt denies fever/chills, chest pain or SOB. Pt NPO since the past midnight, pt took all his am medication today with sip of water   Denies personal hx of blood clots. Denies personal hx of MRSA infection. Denies any personal or family hx of previous complications w/anesthesia. No interval changes to past medical history, social history, family history. Review of systems as stated above and otherwise negative. PHYSICAL EXAM:     Vitals: /70   Pulse 89   Temp 97.7 °F (36.5 °C) (Infrared)   Resp 18   Ht 5' 10\" (1.778 m)   Wt 189 lb 3.2 oz (85.8 kg)   SpO2 99%   BMI 27.15 kg/m²  Body mass index is 27.15 kg/m². Patient is alert and oriented, in no distress. Normotensive. Heart rate and rhythm are regular. Lungs clear to auscultation bilaterally. Abdomen is soft, non tender. No pedal edema. No interval changes. I concur with the findings. Misael De Jesus, MASSIMO - CNP on 9/2/2022 at 1:14 PM          HISTORY and [address] [address] [facility] [address]         NAME:  Va Garza  MRN: [id number]   YOB: 1960   Date: 8/19/2022   Age: 60 y.o.   Gender: male      COMPLAINT AND PRESENT HISTORY:   Va Garza is 60 y.o., male, undergoing IR [address] for ALCOHOLIC CIRRHOSIS OF LIVER WITH ASCITES per Dr. Guy Score.     HPI:  PRE-PARACENTESIS QUESTIONNAIRE:   Last paracentesis date: 8-12-22  Amount removed: 8.7 L- straw colored, non-turbid. Tolerated well: Yes. Any complications: None. Albumin given: Yes. Interval between paracentesis dates: Weekly. GI HX: ESOPHAGEAL CA, HEP. C, HX OF ETOH ABUSE,  PORTAL HTN, ESOPHAGEAL VARICES, CIRRHOSIS, JIMENES ESOPHAGUS, HX OF COLON POLYPS, FATTY LIVER. Medications related to presenting condition: See chart. Taking as prescribed: States compliant. Shortness of breath: Denies. ABD distention: \"Not too bad, been worse\". ABD pain: Denies. Nausea: Denies. Vomiting: Denies. Constipation/diarrhea: Denies. Fever/chills: Denies. Recent unintended weight gain: + weight loss- states 1.5 years ago weighed 240 lbs, now weighs around 180 lbs (after paracentesis last week). Leg swelling: Denies. Jaundice/scleral icterus: Denies. SEE PORTION OF NOTE BELOW PER DR. VERNON, 8-9-22 (REVIEWED):  Reason for consultation:   Esophageal cancer T2N0Mx  Stage II   started on concurrent chemoradiation preoperatively on 5/4/21  Chemoradiation completed 6/9/21  Patient had a PET scan on 7/23/2021 which showed no evidence of recurrence  Patient has been evaluated by thoracic surgeon at [facility] Dr. An Nazario and also hepatologist Dr. Gale Ontiveros his case was discussed at thoracic tumor oncology board with recommendations NOT to do any surgery because of his liver failure. So surveillance recommended  He had an upper endoscopy on 8/31 which showed no residual cancer but showed Jimenes's esophagus with high-grade dysplasia.   HISTORY OF PRESENT ILLNESS:    Oncologic History:  Carrie Arrington is a 57 y.o. male with a history of hepatitis C, status post treatment, liver cirrhosis, history of alcohol abuse was seen during initial consultation visit for newly diagnosed esophageal cancer. Patient reportedly had \"heavy drinking alcohol in about 2016 however recently in December he had 2 beers and he presented with hematemesis. On 12/29/2020 he had upper endoscopy which showed severe portal hypertension, gastropathy. The biopsy from the GE junction showed invasive adenocarcinoma. Patient had a follow-up EGD on 2/2/2021 which confirmed esophageal adenocarcinoma. Patient had endoscopic ultrasound on 2/12/2021 which showed T2 N0 MX disease with underlying esophageal varices. In the esophagus hypoechoic lesion noted in the lower esophagus penetrating into submucosa and into muscularis propria. No lymphadenopathy noted. Patient was referred to medical oncology for further recommendations. He denies any difficulty swallowing. He does not smoke he has quit smoking in 2016. He has not drank alcohol since December. He has a history of hepatitis C and he has received treatment. He lives by himself. He is accompanied by his ex-wife during today's office visit. INTERVAL HISTORY:  Patient is returning for follow-up visit and to discuss lab results, CT scan results and further recommendations. His CT scan showed no evidence of recurrence. He is has recurrent ascites and has been following with gastroenterologist.  He has been now put on once a week paracentesis. He feels much better. He denies any chest pain, shortness of breath, nausea vomiting. During this visit patient's allergy, social, medical, surgical history and medications were reviewed and updated. Review of additional significant medical hx (see chart for additional detail, including current medications / see ROS for current s/s): ESOPHAGEAL CA, HEP. C, HX OF ETOH ABUSE,  PORTAL HTN, ESOPHAGEAL VARICES, CIRRHOSIS, HTN, HLD, JIMENES ESOPHAGUS, COVID-19, SOB.       EKG, 6-3-22:      Narrative & Impression     SR frequent PAC  Nonspecific ST abnormality  Abnormal ECG      Specimen Collected: 06/03/22 08:50 EDT          ECHO, 12-20-21:  CONCLUSIONS     Summary  Left ventricle is normal in size. Mild left ventricular hypertrophy. Global left ventricular systolic function is normal. Estimated LV EF 60-65  %. Left atrium is mildly dilated. No significant valvular regurgitation or stenosis seen. No significant pericardial effusion is seen. Normal aortic root dimension. Signature  ----------------------------------------------------------------------------   Electronically signed by Loraine Mcdaniel(Sonographer) on 12/21/2021 01:17   PM  ----------------------------------------------------------------------------     ----------------------------------------------------------------------------   Electronically signed by Yoni Katz(Interpreting physician) on 12/21/2021   01:25 PM     NPO status: No restrictions given per patient. Last ate and drank around 08:00 AM today. Medications taken TODAY: States everything on his medication list that he is prescribed to take in the morning. Anticoagulation status: Patient denies taking any anti-coagulants, including aspirin currently. Denies personal hx of blood clots. Denies personal hx of MRSA infection. Denies any personal or family hx of previous complications w/anesthesia. RECENT IMAGING R/T HPI   IR US GUIDED PARACENTESIS     Result Date: 8/12/2022  Successful therapeutic paracentesis. IR US GUIDED PARACENTESIS     Result Date: 8/5/2022  Successful ultrasound-guided paracentesis      IR US GUIDED PARACENTESIS     Result Date: 7/21/2022  Successful therapeutic paracentesis. CT CHEST ABDOMEN PELVIS W CONTRAST     Result Date: 7/25/2022  1. No evidence for metastatic disease. 2. No significant lymphadenopathy. 3. Redemonstration of small hiatal hernia and some distal esophageal wall thickening. 4. Large volume ascites similar to prior. Small paraesophageal and gastrohepatic varices stable.       PAST MEDICAL HISTORY      Past Medical History Past Medical History:   Diagnosis Date    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 ([address]      Alcoholic cirrhosis of liver with ascites ([address]      Anemia 04/13/2022    Anxiety      Arthritis      Back pain, chronic       dr. Shellie Borjas, orthopedic, every 3-4 months, gets steroid injection    Lezama esophagus      BPH (benign prostatic hypertrophy)      Cholelithiasis      Cirrhosis ([address]      COVID-19 12/2020     pt reports he had a positive test while at [facility] in 2020, was asymptomatic    COVID-19 vaccine series completed 5/20/2021, 6/22/2021     Moderna 5/20/2021, 6/22/2021    DDD (degenerative disc disease), lumbar      Depression      Esophageal cancer ([address]       INVASIVE ADENOCARCINOMA ARISING IN TUBULAR ADENOMA WITH HIGH GRADE DYSPLASIA, ASSOCIATED WITH FOCAL INTESTINAL METAPLASIA     Esophageal varices ([address]      Fatty liver      GERD (gastroesophageal reflux disease)      Hep C w/o coma, chronic ([address]      History of alcohol abuse       6-12 beers a day; quit drinking 2019    History of blood transfusion      History of colon polyps 2016    History of tobacco abuse      [place] (hard of hearing)      Hyperlipidemia      Hypertension      Hyponatremia 07/20/2016    Port-A-Cath in place       right upper chest    Portal hypertension ([address]      Sciatica      Secondary esophageal varices ([address] 06/07/2022    Shortness of breath      Spinal stenosis      Stomach ulcer       hx of    Tubular adenoma of colon 2016, 2018    Vitamin D deficiency      Wears glasses              SURGICAL HISTORY        Past Surgical History         Past Surgical History:   Procedure Laterality Date    BUNIONECTOMY         twice on right side    BUNIONECTOMY Left      CARPAL TUNNEL RELEASE Right      COLONOSCOPY         at age 35    COLONOSCOPY   10/05/2016     polyps-pathology tubular adenoma, and abnormal looking mucosa right colon-pathology-tubular adenoma    COLONOSCOPY N/A 03/30/2018     COLONOSCOPY POLYPECTOMY COLD BIOPSY performed by Anjum Bernardo Sherrie Tavares MD at [address] [facility]   03/30/2018     Small polyp in the sigmoid colon and excised with biopsy forceps--tubular adenoma    COLONOSCOPY N/A 04/16/2022     COLONOSCOPY POLYPECTOMY performed by Jian Uribe MD at [address], COLON, DIAGNOSTIC         EGD    IR PORT PLACEMENT EQUAL OR GREATER THAN 5 YEARS   04/19/2021     IR PORT PLACEMENT EQUAL OR GREATER THAN 5 YEARS 4/19/2021 [facility] PROCEDURES    KNEE SURGERY Left       cyst removed    NASAL SEPTUM SURGERY        NERVE BLOCK Right 11/23/2020     NERVE BLOCK RIGHT CERVICAL STEROID INJECTION  C3-C6 performed by Shante Walton MD at [address]  [facility]   01/04/2016     steroid injection C7 T1    OTHER SURGICAL HISTORY   11/21/2016     Bilateral Lumbar CACHORRO L4-L5 injections    OTHER SURGICAL HISTORY   12/19/2016     lumbar steroid injection    OTHER SURGICAL HISTORY   09/28/2018     BILATERAL L5 CACHORRO (N/A Back)    OTHER SURGICAL HISTORY Right 11/23/2020     cervical injection    PAIN MANAGEMENT PROCEDURE Left 07/09/2020     EPIDURAL STEROID INJECTION LEFT L4 L5 performed by Shante Walton MD at [facility] [address] Left 07/20/2020     LEFT L4 L5 EPIDURAL STEROID INJECTION performed by Shante Walton MD at [facility] [address] Bilateral 08/17/2020     LUMBAR FACET BILATERAL L2-L5 performed by Shante Walton MD at [facility] [address] Bilateral 12/07/2020     NERVE BLOCK BILATERAL LUMBAR MEDIAL BRANCH L2-L5 performed by hSante Walton MD at [address] [place] [address]         [place]    [facility] [place] [address] AA&/STRD TFRML EPI LUMBAR/SACRAL 1 LEVEL Bilateral 09/06/2018     BILATERAL L5 CACHORRO performed by Shante Walton MD at [facility] AA&/STRD TFRML EPI LUMBAR/SACRAL 1 LEVEL N/A 09/28/2018     BILATERAL L5 CACHORRO performed by Shante Walton MD at [facility] N/CARPAL [facility] Right 08/29/2017     CARPAL TUNNEL RELEASE RIGHT performed by Clarissa Biggs MD at [id number] [place] [address]  [place] [facility] N/CARPAL TUNNEL SURG Left 10/31/2017     CARPAL TUNNEL RELEASE performed by Cary Runner, MD at [address] [facility] 2020     EGD BIOPSY performed by Diana Mccurdy MD at [address] [facility] 2021     EGD BIOPSY and spot marking performed by Jennifer Barlow MD at [address] [facility] 2021     ENDOSCOPIC ULTRASOUND, EGD performed by Jeniffer Menchaca MD at [facility] [address]   2021     EGD DIAGNOSTIC ONLY performed by Jeniffer Menchaca MD at [facility] [address] N/A 2021     EGD BIOPSY performed by Jennifer Barlow MD at [address] [place] [address] 2022     EGD BIOPSY performed by Jennifer Barlow MD at [address] [facility] N/A 04/15/2022     EGD ESOPHAGOGASTRODUODENOSCOPY performed by Diana Mccurdy MD at [address] [facility] N/A 2022     EGD BAND LIGATION performed by Corinna Feliciano MD at [address] [facility] N/A 2022     EGD ESOPHAGOGASTRODUODENOSCOPY performed by Corinna Feliciano MD at [address] [place] [address] History   Social History            Socioeconomic History    Marital status: Single       Spouse name: None    Number of children: None    Years of education: None    Highest education level: None   Tobacco Use    Smoking status: Former       Packs/day: 1.00       Years: 45.00       Pack years: 45.00       Types: Cigarettes       Quit date: 2017       Years since quittin.5    Smokeless tobacco: Never   Vaping Use    Vaping Use: Never used   Substance and Sexual Activity    Alcohol use: Not Currently       Comment: Quit alcohol in 2019- heavier drinking prior to quitting    Drug use: Not Currently       Frequency: 1.0 times per week Types: Cocaine       Comment: Cocaine- stopped spring 2016    Sexual activity: Yes       Partners: Female   Social History Narrative      in the past, retired      Social Determinants of Health          Financial Resource Strain: Low Risk     Difficulty of Paying Living Expenses: Not hard at all   Food Insecurity: No Food Insecurity    Worried About [address] in the Last Year: Never true    [facility] in the Last Year: Never true   Physical Activity: Inactive    Days of Exercise per Week: 0 days    Minutes of Exercise per Session: 0 min            REVIEW OF SYSTEMS    No Known Allergies            Current Outpatient Medications on File Prior to Encounter   Medication Sig Dispense Refill    midodrine (PROAMATINE) 5 MG tablet Take 2 tablets by mouth in the morning and 2 tablets at noon and 2 tablets before bedtime.  90 tablet 3    furosemide (LASIX) 20 MG tablet Take 1 tablet by mouth 2 times daily 60 tablet 3    spironolactone (ALDACTONE) 100 MG tablet Take 1 tablet by mouth every evening (Patient taking differently: Take 100 mg by mouth every evening Take 1 tablet at 100mg every other day, Other days take 1/2 tablet) 30 tablet 1    pantoprazole (PROTONIX) 40 MG tablet Take 1 tablet by mouth 2 times daily for 14 days 28 tablet 0    sucralfate (CARAFATE) 1 GM tablet Take 1 tablet by mouth 4 times daily for 14 days 56 tablet 0    vitamin D (CHOLECALCIFEROL) 25 MCG (1000 UT) TABS tablet Take 1,000 Units by mouth daily        ondansetron (ZOFRAN-ODT) 4 MG disintegrating tablet dissolve 1 tablet by mouth every 8 hours if needed for nausea and vomiting 10 tablet 0    lactulose (CHRONULAC) 10 GM/15ML solution take 30 milliliters by mouth three times a day 473 mL 1    nadolol (CORGARD) 20 MG tablet take 1 tablet by mouth once daily        FLUoxetine (PROZAC) 20 MG capsule Take 1 capsule by mouth daily 30 capsule 3    atorvastatin (LIPITOR) 20 MG tablet Take 1 tablet by mouth nightly 30 tablet 3 ferrous sulfate (IRON 325) 325 (65 Fe) MG tablet Take 1 tablet by mouth 2 times daily 60 tablet 5      No current facility-administered medications on file prior to encounter. Review of Systems   Constitutional:  Negative for chills and fever. HENT:  Negative for congestion, ear pain, rhinorrhea, sore throat and trouble swallowing. Respiratory:  Negative for cough, shortness of breath and wheezing. Cardiovascular:  Negative for chest pain, palpitations and leg swelling. Gastrointestinal:         See HPI. Genitourinary:  Negative for dysuria and frequency. Neurological:  Negative for dizziness and headaches. Hematological:  Does not bruise/bleed easily. GENERAL PHYSICAL EXAM      Vitals: /65   Pulse 76   Temp 97.1 °F (36.2 °C) (Infrared)   Resp 16   Ht 5' 10\" (1.778 m)   Wt 193 lb (87.5 kg)   SpO2 99%   BMI 27.69 kg/m²       GENERAL APPEARANCE:   Frankie Haynes is 60 y.o.,  male,  overweight appearing , nourished, conscious, alert. Does not appear to be in any distress or pain at this time. Physical Exam  Constitutional:       General: He is not in acute distress. Appearance: He is well-developed. He is not ill-appearing, toxic-appearing or diaphoretic. HENT:      Head: Normocephalic. Right Ear: External ear normal.      Left Ear: External ear normal.      Nose: Nose normal.      Mouth/Throat:      Dentition: Abnormal dentition (Decay). Pharynx: No oropharyngeal exudate or posterior oropharyngeal erythema. Tonsils: No tonsillar abscesses. Eyes:      General: No scleral icterus. Right eye: No discharge. Left eye: No discharge. Pupils: Pupils are equal, round, and reactive to light. Comments: + glasses. Cardiovascular:      Rate and Rhythm: Normal rate and regular rhythm. Pulses: Intact distal pulses. Heart sounds: Normal heart sounds.    Pulmonary:      Effort: Pulmonary effort is normal. No accessory muscle usage or respiratory distress. Breath sounds: Normal breath sounds. No decreased breath sounds, wheezing, rhonchi or rales. Comments: Patient seemed slightly SOB during some of conversation at beginning of interview after walking into exam room, improved w/rest.  Abdominal:      General: Abdomen is protuberant. Bowel sounds are normal. There is distension (+ ascites). Palpations: Abdomen is soft. There is no mass. Tenderness: There is no abdominal tenderness. There is no guarding or rebound. Musculoskeletal:      Right lower leg: No swelling or tenderness. Left lower leg: No swelling or tenderness. Comments: Negative Sebastien's sign b/l. Skin:     General: Skin is warm and dry. Coloration: Skin is pale (Slightly). Skin is not jaundiced. Comments: Scattered bruising to b/l UE's, scattered scabbing to b/l UE's and LE's. Subdermal port to right upper chest, no pain w/light palpation. Neurological:      Mental Status: He is alert and oriented to person, place, and time. Psychiatric:         Behavior: Behavior is agitated (Slightly).          RECENT LAB WORK            Lab Results   Component Value Date      (L) 08/15/2022     K 4.9 08/15/2022     CL 93 (L) 08/15/2022     CO2 20 08/15/2022     BUN 8 08/15/2022     CREATININE 0.64 (L) 08/15/2022     GLUCOSE 93 08/15/2022     CALCIUM 8.7 08/15/2022     PROT 5.3 (L) 08/15/2022     LABALBU 3.2 (L) 08/15/2022     BILITOT 1.63 (H) 08/15/2022     ALKPHOS 144 (H) 08/15/2022     AST 33 08/15/2022     ALT 15 08/15/2022     LABGLOM >60 08/15/2022     GFRAA >60 08/15/2022     GLOB NOT REPORTED 08/19/2021            Lab Results   Component Value Date     WBC 7.4 08/15/2022     HGB 7.4 (L) 08/15/2022     HGB 7.4 (L) 08/15/2022     HCT 25.4 (L) 08/15/2022     HCT 25.4 (L) 08/15/2022     MCV 87.6 08/15/2022      08/15/2022            Lab Results   Component Value Date     APTT 33.9 07/21/2022            Lab Results   Component Value Date     INR 1.1 07/21/2022     INR 1.4 06/07/2022     INR 1.2 04/26/2022     PROTIME 14.6 07/21/2022     PROTIME 16.9 (H) 06/07/2022     PROTIME 15.5 (H) 04/26/2022            Lab Results   Component Value Date/Time     MG 1.5 06/04/2022 04:52 AM      PROVISIONAL DIAGNOSES / PROCEDURE:       ALCOHOLIC CIRRHOSIS OF LIVER WITH ASCITES      IR US GUIDED PARACENTESIS          Patient Active Problem List     Diagnosis Date Noted    Secondary esophageal varices ([address] 06/07/2022    Esophageal polyp 06/07/2022    Drop in hemoglobin 06/03/2022    Portal hypertension (HCC)      Shortness of breath      Ascites 04/26/2022    Acute kidney failure, unspecified ([address] 04/21/2022    Muscle weakness (generalized) 07/28/2016    Other abnormalities of gait and mobility 07/28/2016    Anemia 04/13/2022    Acute kidney injury ([address] 04/13/2022    Esophageal adenocarcinoma ([address]      Low hemoglobin 12/20/2021    Symptomatic anemia, microcytic, acute 12/20/2021    Hypotension 12/20/2021    Former smoker, 50+ pack years, quit 2016 12/20/2021    HLD (hyperlipidemia) 12/20/2021    Abnormal findings on diagnostic imaging of spine 12/14/2021    Cervical spinal stenosis 12/14/2021    Spinal stenosis of lumbar region with neurogenic claudication 12/14/2021    Severe comorbid illness 11/30/2021    Gait instability 11/30/2021    Current smoker 04/05/2021    COVID-19 02/23/2021    Anxiety 02/23/2021    Malignant neoplasm of lower third of esophagus ([address]      Hypocalcemia 12/26/2020    Hypophosphatemia 12/26/2020    Gastrointestinal hemorrhage with melena      Alcohol abuse      Altered mental status      Acute gastrointestinal bleeding 12/23/2020    Thrombocytopenia ([address] 12/23/2020    Hepatitis C virus infection resolved after antiviral drug therapy 12/23/2020    Cervical facet syndrome 11/23/2020    Lumbar facet arthropathy 08/17/2020    Elevated LFTs 08/12/2020    Seasonal allergies 08/12/2020    S/P epidural steroid injection 08/05/2020    Essential hypertension 04/24/2019    Recurrent major depressive disorder in partial remission ([address] 04/24/2019    Pure hypercholesterolemia 02/04/2019    Hypokalemia 02/04/2019    Vitamin D deficiency 09/20/2017    History of hepatitis C 09/11/2017    Ganglion cyst 05/31/2017    Carpal tunnel syndrome of right wrist 05/31/2017    Tinnitus 03/23/2017    Eustachian tube dysfunction 03/23/2017    Lumbar radiculitis 11/08/2016    Lumbar disc herniation 11/08/2016    Gynecomastia, male 10/26/2016    Depression 10/13/2016    Vertebrogenic low back pain 10/06/2016    DDD (degenerative disc disease), lumbar 10/06/2016    Hepatic cirrhosis ([address] 09/15/2016    Psychophysiologic insomnia 09/14/2016    Cirrhosis ([address]      Hep C w/o coma, chronic (HCC)      Fatty liver      Calculus of gallbladder without cholecystitis 08/10/2016    Chronic viral hepatitis B without delta agent and without coma ([address] 07/22/2016    Hypomagnesemia      Alcohol withdrawal syndrome without complication ([address] 93/49/8294    Cervical radicular pain 01/04/2016    Tubular adenoma of colon 01/01/2016    History of colon polyps 01/01/2016    Back pain, chronic 04/19/2012    Hearing difficulty 04/19/2012    GERD (gastroesophageal reflux disease) 04/19/2012            MASSIMO Freed - CNP on 8/19/2022 at 12:18 PM

## 2022-09-02 NOTE — DISCHARGE INSTRUCTIONS
DISCHARGE INSTRUCTIONS FOR PARACENTESIS    In order to continue your care at home, please follow the instructions below. Medications    Use over-the-counter pain medication as directed on bottle for pain control    Post Procedure Site/Care/Activity  For up to 2 days after the procedure, you may have a small amount of clear fluid coming out of the site where the needle was inserted, especially if you had a lot of fluid removed. You may need to change the bandage on the site. Do not lie totally flat until morning. You can do your normal activities after the procedure, unless instructed differently. Call your doctor or seek immediate medical care if:   You have symptoms of infection, such as increased pain, swelling, warmth, or redness, red streaks or pus. An oral temperature (by mouth) is 101 degrees or higher (fever), chills, fever. You are dizzy or lightheaded, or you feel like you may faint. You have new or worse belly pain. Watch closely for changes in your health, and be sure to contact your doctor if:   Fluid builds up in your belly again. You do not get better as expected.       IF YOU CANNOT REACH THE DOCTOR, GO TO THE NEAREST EMERGENCY ROOM OR CALL 911    Phone: Interventional Radiology   [phone]    Dr Toussaint Guilford  After hours Radiology   [phone]        8.8 Liters removed

## 2022-09-02 NOTE — H&P
male, undergoing IR [address] for ALCOHOLIC CIRRHOSIS OF LIVER WITH ASCITES per Dr. Evelyne Dance.     HPI:  PRE-PARACENTESIS QUESTIONNAIRE:   Last paracentesis date: 8-12-22  Amount removed: 8.7 L- straw colored, non-turbid. Tolerated well: Yes. Any complications: None. Albumin given: Yes. Interval between paracentesis dates: Weekly. GI HX: ESOPHAGEAL CA, HEP. C, HX OF ETOH ABUSE,  PORTAL HTN, ESOPHAGEAL VARICES, CIRRHOSIS, JIMENES ESOPHAGUS, HX OF COLON POLYPS, FATTY LIVER. Medications related to presenting condition: See chart. Taking as prescribed: States compliant. Shortness of breath: Denies. ABD distention: \"Not too bad, been worse\". ABD pain: Denies. Nausea: Denies. Vomiting: Denies. Constipation/diarrhea: Denies. Fever/chills: Denies. Recent unintended weight gain: + weight loss- states 1.5 years ago weighed 240 lbs, now weighs around 180 lbs (after paracentesis last week). Leg swelling: Denies. Jaundice/scleral icterus: Denies. SEE PORTION OF NOTE BELOW PER DR. VERNON, 8-9-22 (REVIEWED):  Reason for consultation:   Esophageal cancer T2N0Mx  Stage II   started on concurrent chemoradiation preoperatively on 5/4/21  Chemoradiation completed 6/9/21  Patient had a PET scan on 7/23/2021 which showed no evidence of recurrence  Patient has been evaluated by thoracic surgeon at [facility] Dr. Dionte Larkin and also hepatologist Dr. Manuela Quinn his case was discussed at thoracic tumor oncology board with recommendations NOT to do any surgery because of his liver failure. So surveillance recommended  He had an upper endoscopy on 8/31 which showed no residual cancer but showed Jimenes's esophagus with high-grade dysplasia.   HISTORY OF PRESENT ILLNESS:    Oncologic History:  Pietro Blizzard is a 57 y.o. male with a history of hepatitis C, status post treatment, liver cirrhosis, history of alcohol abuse was seen during initial consultation visit for newly diagnosed esophageal cancer. Patient reportedly had \"heavy drinking alcohol in about 2016 however recently in December he had 2 beers and he presented with hematemesis. On 12/29/2020 he had upper endoscopy which showed severe portal hypertension, gastropathy. The biopsy from the GE junction showed invasive adenocarcinoma. Patient had a follow-up EGD on 2/2/2021 which confirmed esophageal adenocarcinoma. Patient had endoscopic ultrasound on 2/12/2021 which showed T2 N0 MX disease with underlying esophageal varices. In the esophagus hypoechoic lesion noted in the lower esophagus penetrating into submucosa and into muscularis propria. No lymphadenopathy noted. Patient was referred to medical oncology for further recommendations. He denies any difficulty swallowing. He does not smoke he has quit smoking in 2016. He has not drank alcohol since December. He has a history of hepatitis C and he has received treatment. He lives by himself. He is accompanied by his ex-wife during today's office visit. INTERVAL HISTORY:  Patient is returning for follow-up visit and to discuss lab results, CT scan results and further recommendations. His CT scan showed no evidence of recurrence. He is has recurrent ascites and has been following with gastroenterologist.  He has been now put on once a week paracentesis. He feels much better. He denies any chest pain, shortness of breath, nausea vomiting. During this visit patient's allergy, social, medical, surgical history and medications were reviewed and updated. Review of additional significant medical hx (see chart for additional detail, including current medications / see ROS for current s/s): ESOPHAGEAL CA, HEP. C, HX OF ETOH ABUSE,  PORTAL HTN, ESOPHAGEAL VARICES, CIRRHOSIS, HTN, HLD, JIMENES ESOPHAGUS, COVID-19, SOB.       EKG, 6-3-22:      Narrative & Impression     SR frequent PAC  Nonspecific ST abnormality  Abnormal ECG      Specimen Collected: 06/03/22 08:50 EDT          ECHO, 12-20-21:  CONCLUSIONS     Summary  Left ventricle is normal in size. Mild left ventricular hypertrophy. Global left ventricular systolic function is normal. Estimated LV EF 60-65  %. Left atrium is mildly dilated. No significant valvular regurgitation or stenosis seen. No significant pericardial effusion is seen. Normal aortic root dimension. Signature  ----------------------------------------------------------------------------   Electronically signed by Loraine Mcdaniel(Sonographer) on 12/21/2021 01:17   PM  ----------------------------------------------------------------------------     ----------------------------------------------------------------------------   Electronically signed by Yoni Katz(Interpreting physician) on 12/21/2021   01:25 PM     NPO status: No restrictions given per patient. Last ate and drank around 08:00 AM today. Medications taken TODAY: States everything on his medication list that he is prescribed to take in the morning. Anticoagulation status: Patient denies taking any anti-coagulants, including aspirin currently. Denies personal hx of blood clots. Denies personal hx of MRSA infection. Denies any personal or family hx of previous complications w/anesthesia. RECENT IMAGING R/T HPI   IR US GUIDED PARACENTESIS     Result Date: 8/12/2022  Successful therapeutic paracentesis. IR US GUIDED PARACENTESIS     Result Date: 8/5/2022  Successful ultrasound-guided paracentesis      IR US GUIDED PARACENTESIS     Result Date: 7/21/2022  Successful therapeutic paracentesis. CT CHEST ABDOMEN PELVIS W CONTRAST     Result Date: 7/25/2022  1. No evidence for metastatic disease. 2. No significant lymphadenopathy. 3. Redemonstration of small hiatal hernia and some distal esophageal wall thickening. 4. Large volume ascites similar to prior. Small paraesophageal and gastrohepatic varices stable.       PAST MEDICAL HISTORY      Past Medical History Past Medical History:   Diagnosis Date    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 ([address]      Alcoholic cirrhosis of liver with ascites ([address]      Anemia 04/13/2022    Anxiety      Arthritis      Back pain, chronic       dr. Fuentes Parrish, orthopedic, every 3-4 months, gets steroid injection    Lezama esophagus      BPH (benign prostatic hypertrophy)      Cholelithiasis      Cirrhosis ([address]      COVID-19 12/2020     pt reports he had a positive test while at [facility] in 2020, was asymptomatic    COVID-19 vaccine series completed 5/20/2021, 6/22/2021     Moderna 5/20/2021, 6/22/2021    DDD (degenerative disc disease), lumbar      Depression      Esophageal cancer ([address]       INVASIVE ADENOCARCINOMA ARISING IN TUBULAR ADENOMA WITH HIGH GRADE DYSPLASIA, ASSOCIATED WITH FOCAL INTESTINAL METAPLASIA     Esophageal varices ([address]      Fatty liver      GERD (gastroesophageal reflux disease)      Hep C w/o coma, chronic ([address]      History of alcohol abuse       6-12 beers a day; quit drinking 2019    History of blood transfusion      History of colon polyps 2016    History of tobacco abuse      [place] (hard of hearing)      Hyperlipidemia      Hypertension      Hyponatremia 07/20/2016    Port-A-Cath in place       right upper chest    Portal hypertension ([address]      Sciatica      Secondary esophageal varices ([address] 06/07/2022    Shortness of breath      Spinal stenosis      Stomach ulcer       hx of    Tubular adenoma of colon 2016, 2018    Vitamin D deficiency      Wears glasses              SURGICAL HISTORY        Past Surgical History         Past Surgical History:   Procedure Laterality Date    BUNIONECTOMY         twice on right side    BUNIONECTOMY Left      CARPAL TUNNEL RELEASE Right      COLONOSCOPY         at age 35    COLONOSCOPY   10/05/2016     polyps-pathology tubular adenoma, and abnormal looking mucosa right colon-pathology-tubular adenoma    COLONOSCOPY N/A 03/30/2018     COLONOSCOPY POLYPECTOMY COLD BIOPSY performed by Keily Cardoza Georgina Lowry MD at [address]   03/30/2018     Small polyp in the sigmoid colon and excised with biopsy forceps--tubular adenoma    COLONOSCOPY N/A 04/16/2022     COLONOSCOPY POLYPECTOMY performed by Dahlia Hawkins MD at [facility], COLON, DIAGNOSTIC         EGD    IR PORT PLACEMENT EQUAL OR GREATER THAN 5 YEARS   04/19/2021     IR PORT PLACEMENT EQUAL OR GREATER THAN 5 YEARS 4/19/2021 [facility] SPECIAL PROCEDURES    KNEE SURGERY Left       cyst removed    NASAL SEPTUM SURGERY        NERVE BLOCK Right 11/23/2020     NERVE BLOCK RIGHT CERVICAL STEROID INJECTION  C3-C6 performed by Ira Guevara MD at [facility]   01/04/2016     steroid injection C7 T1    OTHER SURGICAL HISTORY   11/21/2016     Bilateral Lumbar CACHORRO L4-L5 injections    OTHER SURGICAL HISTORY   12/19/2016     lumbar steroid injection    OTHER SURGICAL HISTORY   09/28/2018     BILATERAL L5 CACHORRO (N/A Back)    OTHER SURGICAL HISTORY Right 11/23/2020     cervical injection    PAIN MANAGEMENT PROCEDURE Left 07/09/2020     EPIDURAL STEROID INJECTION LEFT L4 L5 performed by Ira Guevara MD at [address] Left 07/20/2020     LEFT L4 L5 EPIDURAL STEROID INJECTION performed by Ira Guevara MD at [address] Bilateral 08/17/2020     LUMBAR FACET BILATERAL L2-L5 performed by Ira Guevara MD at [address] Bilateral 12/07/2020     NERVE BLOCK BILATERAL LUMBAR MEDIAL BRANCH L2-L5 performed by Ira Guevara MD at [address] [facility] [place] [address] AA&/STRD TFRML EPI LUMBAR/SACRAL 1 LEVEL Bilateral 09/06/2018     BILATERAL L5 CACHORRO performed by Ira Guevara MD at [facility] AA&/STRD TFRML EPI LUMBAR/SACRAL 1 LEVEL N/A 09/28/2018     BILATERAL L5 CACHORRO performed by Ira Guevara MD at [facility] N/CARPAL [address] Right 08/29/2017     CARPAL TUNNEL RELEASE RIGHT performed by Lisa Arrington MD at [address] [place]  [place] [facility] N/CARPAL TUNNEL SURG Left 10/31/2017     CARPAL TUNNEL RELEASE performed by Nasima Johnson MD at [address] [place] [address] 2020     EGD BIOPSY performed by Nely Cormier MD at [address] [place] [address] 2021     EGD BIOPSY and spot marking performed by Any Altamirano MD at [address] [place] [address] 2021     ENDOSCOPIC ULTRASOUND, EGD performed by Andrew Kumari MD at [place] [address]   2021     EGD DIAGNOSTIC ONLY performed by Andrew Kumari MD at [place] [address] N/A 2021     EGD BIOPSY performed by Any Altamirano MD at [address] [place] [address] 2022     EGD BIOPSY performed by Any Altamirano MD at [address] [place] [address] N/A 04/15/2022     EGD ESOPHAGOGASTRODUODENOSCOPY performed by Nely Cormier MD at [address] [place] [address] N/A 2022     EGD BAND LIGATION performed by Rod Greenfield MD at [address] [place] [address] N/A 2022     EGD ESOPHAGOGASTRODUODENOSCOPY performed by Rod Greenfield MD at [address] [place] [address] History   Social History            Socioeconomic History    Marital status: Single       Spouse name: None    Number of children: None    Years of education: None    Highest education level: None   Tobacco Use    Smoking status: Former       Packs/day: 1.00       Years: 45.00       Pack years: 45.00       Types: Cigarettes       Quit date: 2017       Years since quittin.5    Smokeless tobacco: Never   Vaping Use    Vaping Use: Never used   Substance and Sexual Activity    Alcohol use: Not Currently       Comment: Quit alcohol in 2019- heavier drinking prior to quitting    Drug use: Not Currently       Frequency: 1.0 times per week Types: Cocaine       Comment: Cocaine- stopped spring 2016    Sexual activity: Yes       Partners: Female   Social History Narrative      in the past, retired      Social Determinants of Health          Financial Resource Strain: Low Risk     Difficulty of Paying Living Expenses: Not hard at all   Food Insecurity: No Food Insecurity    Worried About [address] in the Last Year: Never true    [facility] in the Last Year: Never true   Physical Activity: Inactive    Days of Exercise per Week: 0 days    Minutes of Exercise per Session: 0 min            REVIEW OF SYSTEMS    No Known Allergies            Current Outpatient Medications on File Prior to Encounter   Medication Sig Dispense Refill    midodrine (PROAMATINE) 5 MG tablet Take 2 tablets by mouth in the morning and 2 tablets at noon and 2 tablets before bedtime.  90 tablet 3    furosemide (LASIX) 20 MG tablet Take 1 tablet by mouth 2 times daily 60 tablet 3    spironolactone (ALDACTONE) 100 MG tablet Take 1 tablet by mouth every evening (Patient taking differently: Take 100 mg by mouth every evening Take 1 tablet at 100mg every other day, Other days take 1/2 tablet) 30 tablet 1    pantoprazole (PROTONIX) 40 MG tablet Take 1 tablet by mouth 2 times daily for 14 days 28 tablet 0    sucralfate (CARAFATE) 1 GM tablet Take 1 tablet by mouth 4 times daily for 14 days 56 tablet 0    vitamin D (CHOLECALCIFEROL) 25 MCG (1000 UT) TABS tablet Take 1,000 Units by mouth daily        ondansetron (ZOFRAN-ODT) 4 MG disintegrating tablet dissolve 1 tablet by mouth every 8 hours if needed for nausea and vomiting 10 tablet 0    lactulose (CHRONULAC) 10 GM/15ML solution take 30 milliliters by mouth three times a day 473 mL 1    nadolol (CORGARD) 20 MG tablet take 1 tablet by mouth once daily        FLUoxetine (PROZAC) 20 MG capsule Take 1 capsule by mouth daily 30 capsule 3    atorvastatin (LIPITOR) 20 MG tablet Take 1 tablet by mouth nightly 30 tablet 3 ferrous sulfate (IRON 325) 325 (65 Fe) MG tablet Take 1 tablet by mouth 2 times daily 60 tablet 5      No current facility-administered medications on file prior to encounter. Review of Systems   Constitutional:  Negative for chills and fever. HENT:  Negative for congestion, ear pain, rhinorrhea, sore throat and trouble swallowing. Respiratory:  Negative for cough, shortness of breath and wheezing. Cardiovascular:  Negative for chest pain, palpitations and leg swelling. Gastrointestinal:         See HPI. Genitourinary:  Negative for dysuria and frequency. Neurological:  Negative for dizziness and headaches. Hematological:  Does not bruise/bleed easily. GENERAL PHYSICAL EXAM      Vitals: /65   Pulse 76   Temp 97.1 °F (36.2 °C) (Infrared)   Resp 16   Ht 5' 10\" (1.778 m)   Wt 193 lb (87.5 kg)   SpO2 99%   BMI 27.69 kg/m²       GENERAL APPEARANCE:   Martha Fernandez is 60 y.o.,  male,  overweight appearing , nourished, conscious, alert. Does not appear to be in any distress or pain at this time. Physical Exam  Constitutional:       General: He is not in acute distress. Appearance: He is well-developed. He is not ill-appearing, toxic-appearing or diaphoretic. HENT:      Head: Normocephalic. Right Ear: External ear normal.      Left Ear: External ear normal.      Nose: Nose normal.      Mouth/Throat:      Dentition: Abnormal dentition ([place]). Pharynx: No oropharyngeal exudate or posterior oropharyngeal erythema. Tonsils: No tonsillar abscesses. Eyes:      General: No scleral icterus. Right eye: No discharge. Left eye: No discharge. Pupils: Pupils are equal, round, and reactive to light. Comments: + glasses. Cardiovascular:      Rate and Rhythm: Normal rate and regular rhythm. Pulses: Intact distal pulses. Heart sounds: Normal heart sounds.    Pulmonary:      Effort: Pulmonary effort is normal. No accessory muscle usage or respiratory distress. Breath sounds: Normal breath sounds. No decreased breath sounds, wheezing, rhonchi or rales. Comments: Patient seemed slightly SOB during some of conversation at beginning of interview after walking into exam room, improved w/rest.  Abdominal:      General: Abdomen is protuberant. Bowel sounds are normal. There is distension (+ ascites). Palpations: Abdomen is soft. There is no mass. Tenderness: There is no abdominal tenderness. There is no guarding or rebound. Musculoskeletal:      Right lower leg: No swelling or tenderness. Left lower leg: No swelling or tenderness. Comments: Negative Sebastien's sign b/l. Skin:     General: Skin is warm and dry. Coloration: Skin is pale (Slightly). Skin is not jaundiced. Comments: Scattered bruising to b/l UE's, scattered scabbing to b/l UE's and LE's. Subdermal port to right upper chest, no pain w/light palpation. Neurological:      Mental Status: He is alert and oriented to person, place, and time. Psychiatric:         Behavior: Behavior is agitated (Slightly).          RECENT LAB WORK            Lab Results   Component Value Date      (L) 08/15/2022     K 4.9 08/15/2022     CL 93 (L) 08/15/2022     CO2 20 08/15/2022     BUN 8 08/15/2022     CREATININE 0.64 (L) 08/15/2022     GLUCOSE 93 08/15/2022     CALCIUM 8.7 08/15/2022     PROT 5.3 (L) 08/15/2022     LABALBU 3.2 (L) 08/15/2022     BILITOT 1.63 (H) 08/15/2022     ALKPHOS 144 (H) 08/15/2022     AST 33 08/15/2022     ALT 15 08/15/2022     LABGLOM >60 08/15/2022     GFRAA >60 08/15/2022     GLOB NOT REPORTED 08/19/2021            Lab Results   Component Value Date     WBC 7.4 08/15/2022     HGB 7.4 (L) 08/15/2022     HGB 7.4 (L) 08/15/2022     HCT 25.4 (L) 08/15/2022     HCT 25.4 (L) 08/15/2022     MCV 87.6 08/15/2022      08/15/2022            Lab Results   Component Value Date     APTT 33.9 07/21/2022            Lab injection 08/05/2020    Essential hypertension 04/24/2019    Recurrent major depressive disorder in partial remission ([address] 04/24/2019    Pure hypercholesterolemia 02/04/2019    Hypokalemia 02/04/2019    Vitamin D deficiency 09/20/2017    History of hepatitis C 09/11/2017    Ganglion cyst 05/31/2017    Carpal tunnel syndrome of right wrist 05/31/2017    Tinnitus 03/23/2017    Eustachian tube dysfunction 03/23/2017    Lumbar radiculitis 11/08/2016    Lumbar disc herniation 11/08/2016    Gynecomastia, male 10/26/2016    Depression 10/13/2016    Vertebrogenic low back pain 10/06/2016    DDD (degenerative disc disease), lumbar 10/06/2016    Hepatic cirrhosis ([address] 09/15/2016    Psychophysiologic insomnia 09/14/2016    Cirrhosis ([address]      Hep C w/o coma, chronic (HCC)      Fatty liver      Calculus of gallbladder without cholecystitis 08/10/2016    Chronic viral hepatitis B without delta agent and without coma ([address] 07/22/2016    Hypomagnesemia      Alcohol withdrawal syndrome without complication ([address] 17/97/9161    Cervical radicular pain 01/04/2016    Tubular adenoma of colon 01/01/2016    History of colon polyps 01/01/2016    Back pain, chronic 04/19/2012    Hearing difficulty 04/19/2012    GERD (gastroesophageal reflux disease) 04/19/2012            MASSIMO Baca - CNP on 8/19/2022 at 12:18 PM

## 2022-09-02 NOTE — BRIEF OP NOTE
Brief Postoperative Note    Nasima Hannah  YOB: 1960  [id number]    Pre-operative Diagnosis: ascites    Post-operative Diagnosis: Same    Procedure: US guided paracentesis    Anesthesia: Local    Surgeons/Assistants: Dr. Meghan Dunlap    Estimated Blood Loss: less than 50     Complications: None    Specimens: Was Obtained: 10.2 liters of cloudy yellow ascitic fluid    Findings: successful paracentesis    Electronically signed by Tete Salazar MD on 9/2/2022 at 2:45 PM

## 2022-09-09 ENCOUNTER — TELEPHONE (OUTPATIENT)
Dept: PRIMARY CARE CLINIC | Age: 62
End: 2022-09-09

## 2022-09-09 ENCOUNTER — HOSPITAL ENCOUNTER (OUTPATIENT)
Dept: INTERVENTIONAL RADIOLOGY/VASCULAR | Age: 62
Discharge: HOME | End: 2022-09-11
Payer: MEDICARE

## 2022-09-09 VITALS
SYSTOLIC BLOOD PRESSURE: 105 MMHG | BODY MASS INDEX: 25.35 KG/M2 | WEIGHT: 177.1 LBS | OXYGEN SATURATION: 100 % | TEMPERATURE: 97.9 F | RESPIRATION RATE: 18 BRPM | HEART RATE: 82 BPM | DIASTOLIC BLOOD PRESSURE: 56 MMHG | HEIGHT: 70 IN

## 2022-09-09 DIAGNOSIS — K70.31 ALCOHOLIC CIRRHOSIS OF LIVER WITH ASCITES (HCC): ICD-10-CM

## 2022-09-09 PROCEDURE — 7100000011 HC PHASE II RECOVERY - ADDTL 15 MIN

## 2022-09-09 PROCEDURE — 6360000002 HC RX W HCPCS: Performed by: RADIOLOGY

## 2022-09-09 PROCEDURE — C1729 CATH, DRAINAGE: HCPCS

## 2022-09-09 PROCEDURE — 49083 ABD PARACENTESIS W/IMAGING: CPT

## 2022-09-09 PROCEDURE — 7100000010 HC PHASE II RECOVERY - FIRST 15 MIN

## 2022-09-09 PROCEDURE — P9047 ALBUMIN (HUMAN), 25%, 50ML: HCPCS | Performed by: RADIOLOGY

## 2022-09-09 RX ORDER — ALBUMIN (HUMAN) 12.5 G/50ML
25 SOLUTION INTRAVENOUS ONCE
Status: COMPLETED | OUTPATIENT
Start: 2022-09-09 | End: 2022-09-09

## 2022-09-09 RX ORDER — HEPARIN SODIUM (PORCINE) LOCK FLUSH IV SOLN 100 UNIT/ML 100 UNIT/ML
500 SOLUTION INTRAVENOUS ONCE
Status: COMPLETED | OUTPATIENT
Start: 2022-09-09 | End: 2022-09-09

## 2022-09-09 RX ORDER — SODIUM CHLORIDE 0.9 % (FLUSH) 0.9 %
5-40 SYRINGE (ML) INJECTION EVERY 12 HOURS SCHEDULED
Status: DISCONTINUED | OUTPATIENT
Start: 2022-09-09 | End: 2022-09-12 | Stop reason: HOSPADM

## 2022-09-09 RX ORDER — SODIUM CHLORIDE 0.9 % (FLUSH) 0.9 %
5-40 SYRINGE (ML) INJECTION PRN
Status: DISCONTINUED | OUTPATIENT
Start: 2022-09-09 | End: 2022-09-12 | Stop reason: HOSPADM

## 2022-09-09 RX ORDER — SODIUM CHLORIDE 9 MG/ML
INJECTION, SOLUTION INTRAVENOUS CONTINUOUS
Status: DISCONTINUED | OUTPATIENT
Start: 2022-09-09 | End: 2022-09-12 | Stop reason: HOSPADM

## 2022-09-09 RX ORDER — SODIUM CHLORIDE 9 MG/ML
INJECTION, SOLUTION INTRAVENOUS PRN
Status: DISCONTINUED | OUTPATIENT
Start: 2022-09-09 | End: 2022-09-12 | Stop reason: HOSPADM

## 2022-09-09 RX ADMIN — ALBUMIN (HUMAN) 25 G: 0.25 INJECTION, SOLUTION INTRAVENOUS at 13:25

## 2022-09-09 RX ADMIN — ALBUMIN (HUMAN) 25 G: 0.25 INJECTION, SOLUTION INTRAVENOUS at 14:07

## 2022-09-09 RX ADMIN — ALBUMIN (HUMAN) 25 G: 0.25 INJECTION, SOLUTION INTRAVENOUS at 13:46

## 2022-09-09 RX ADMIN — Medication 500 UNITS: at 14:32

## 2022-09-09 ASSESSMENT — PAIN - FUNCTIONAL ASSESSMENT: PAIN_FUNCTIONAL_ASSESSMENT: NONE - DENIES PAIN

## 2022-09-09 NOTE — TELEPHONE ENCOUNTER
----- Message from Cholo Barrosoma sent at 9/9/2022  1:06 PM EDT -----  Regarding: FW: aspiration  Please see if patient feeling any better or if he is able to follow up with me on Monday. GO to ER if symptoms worsen before then.   ----- Message -----  From: MASSIMO Bernabe CNP  Sent: 9/9/2022  12:13 PM EDT  To: VASU Barroso  Subject: aspiration                                       Good afternoon, I just saw this patient prior to his paracentesis. He has some mild wheezing in his right posterior upper lung fields. States he aspirated a day or so ago on broth. States he also feels a bit congested, I encouraged him to follow up with you.

## 2022-09-09 NOTE — TELEPHONE ENCOUNTER
Per Brett Arnold see if patient feeling any better or if he is able to follow up with me on Monday. GO to ER if symptoms worsen before then. \"

## 2022-09-09 NOTE — BRIEF OP NOTE
Brief Postoperative Note    Sohan Arguelles  YOB: 1960  [id number]    Pre-operative Diagnosis: Recurrent ascites; abdominal discomfort    Post-operative Diagnosis: Same    Procedure: US guided paracentesis     Anesthesia: Local    Surgeons/Assistants: Gregg    Estimated Blood Loss: less than 50     Complications: None    Specimens: Was Not Obtained     Electronically signed by aKshif Dejesus MD on 9/9/2022 at 1:44 PM

## 2022-09-09 NOTE — DISCHARGE INSTRUCTIONS
DISCHARGE INSTRUCTIONS FOR PARACENTESIS    In order to continue your care at home, please follow the instructions below. Medications    Use over-the-counter pain medication as directed on bottle for pain control    Post Procedure Site/Care/Activity  For up to 2 days after the procedure, you may have a small amount of clear fluid coming out of the site where the needle was inserted, especially if you had a lot of fluid removed. You may need to change the bandage on the site. Do not lie totally flat until morning. You can do your normal activities after the procedure, unless instructed differently. Call your doctor or seek immediate medical care if:   You have symptoms of infection, such as increased pain, swelling, warmth, or redness, red streaks or pus. An oral temperature (by mouth) is 101 degrees or higher (fever), chills, fever. You are dizzy or lightheaded, or you feel like you may faint. You have new or worse belly pain. Watch closely for changes in your health, and be sure to contact your doctor if:   Fluid builds up in your belly again. You do not get better as expected.       IF YOU CANNOT REACH THE DOCTOR, GO TO THE NEAREST EMERGENCY ROOM OR CALL 911    Phone: Interventional Radiology   [phone] ( Dr Theresa Addison )  After hours Radiology   [phone]       Pre weight 190 lbs  Post weight 177.1 lbs  7.9 Liters

## 2022-09-09 NOTE — INTERVAL H&P NOTE
Update History & Physical    The patient's History and Physical of September 2, 2022 was reviewed with the patient and I examined the patient. There was no change with the exception of: pt reports falling on Monday as well as some mild sinus congestion, I did encourage him to follow up with his PCP. Last paracentesis was 9/2/22 pt states 10.5 L removed, state symptoms improved and has now filled back up. Physical exam remains unchanged including pulmonary and cardiac assessments with the exception of: mild wheezing noted to right upper lung field. NPO since midnight, Gatorade with meds. Pt does not wear dentures. Pt denies any hx of MRSA infection  Pt not currently taking any blood thinners or anticoagulants  Pt denies any personal or FHx of complications with anesthesia. Pt denies any acute symptoms of illness at this time including no SOB, CP, fever, URI or UTI symptoms.        Electronically signed by MASSIMO Price CNP on 9/9/2022 at 12:03 PM

## 2022-09-09 NOTE — OR NURSING
Patient tolerated procedure well without distress. 7.9 L of clear, light yellow fluid removed. Area cleansed & dry dressing applied. Transport notified to take patient back to [facility]. Parker Martinez RN updated.

## 2022-09-12 ENCOUNTER — TELEPHONE (OUTPATIENT)
Dept: GASTROENTEROLOGY | Age: 62
End: 2022-09-12

## 2022-09-12 ENCOUNTER — HOSPITAL ENCOUNTER (OUTPATIENT)
Age: 62
Discharge: HOME | DRG: 378 | End: 2022-09-12
Payer: MEDICARE

## 2022-09-12 ENCOUNTER — APPOINTMENT (OUTPATIENT)
Dept: GENERAL RADIOLOGY | Age: 62
DRG: 378 | End: 2022-09-12
Payer: MEDICARE

## 2022-09-12 ENCOUNTER — HOSPITAL ENCOUNTER (INPATIENT)
Age: 62
LOS: 3 days | Discharge: HOME | DRG: 378 | End: 2022-09-16
Attending: STUDENT IN AN ORGANIZED HEALTH CARE EDUCATION/TRAINING PROGRAM | Admitting: INTERNAL MEDICINE
Payer: MEDICARE

## 2022-09-12 DIAGNOSIS — K70.31 ASCITES DUE TO ALCOHOLIC CIRRHOSIS (HCC): Primary | ICD-10-CM

## 2022-09-12 DIAGNOSIS — D64.9 ANEMIA, UNSPECIFIED TYPE: ICD-10-CM

## 2022-09-12 DIAGNOSIS — K70.31 ASCITES DUE TO ALCOHOLIC CIRRHOSIS (HCC): ICD-10-CM

## 2022-09-12 DIAGNOSIS — K92.0 GASTROINTESTINAL HEMORRHAGE WITH HEMATEMESIS: ICD-10-CM

## 2022-09-12 DIAGNOSIS — K92.2 GASTROINTESTINAL HEMORRHAGE, UNSPECIFIED GASTROINTESTINAL HEMORRHAGE TYPE: Primary | ICD-10-CM

## 2022-09-12 LAB
ABSOLUTE EOS #: 0 K/UL (ref 0–0.4)
ABSOLUTE EOS #: 0.13 K/UL (ref 0–0.4)
ABSOLUTE LYMPH #: 0.12 K/UL (ref 1–4.8)
ABSOLUTE LYMPH #: 0.9 K/UL (ref 1–4.8)
ABSOLUTE MONO #: 0.32 K/UL (ref 0.1–1.3)
ABSOLUTE MONO #: 0.81 K/UL (ref 0.1–1.3)
ALBUMIN SERPL-MCNC: 3 G/DL (ref 3.5–5.2)
ALBUMIN SERPL-MCNC: 3.2 G/DL (ref 3.5–5.2)
ALP BLD-CCNC: 119 U/L (ref 40–129)
ALP BLD-CCNC: 134 U/L (ref 40–129)
ALT SERPL-CCNC: 13 U/L (ref 5–41)
ALT SERPL-CCNC: 16 U/L (ref 5–41)
AMMONIA: 44 UMOL/L (ref 16–60)
ANION GAP SERPL CALCULATED.3IONS-SCNC: 10 MMOL/L (ref 9–17)
ANION GAP SERPL CALCULATED.3IONS-SCNC: 12 MMOL/L (ref 9–17)
AST SERPL-CCNC: 27 U/L
AST SERPL-CCNC: 32 U/L
BACTERIA: NORMAL
BASOPHILS # BLD: 0 % (ref 0–2)
BASOPHILS # BLD: 1 % (ref 0–2)
BASOPHILS ABSOLUTE: 0 K/UL (ref 0–0.2)
BASOPHILS ABSOLUTE: 0.06 K/UL (ref 0–0.2)
BILIRUB SERPL-MCNC: 1.6 MG/DL (ref 0.3–1.2)
BILIRUB SERPL-MCNC: 1.8 MG/DL (ref 0.3–1.2)
BILIRUBIN URINE: ABNORMAL
BUN BLDV-MCNC: 11 MG/DL (ref 8–23)
BUN BLDV-MCNC: 13 MG/DL (ref 8–23)
CALCIUM SERPL-MCNC: 8.2 MG/DL (ref 8.6–10.4)
CALCIUM SERPL-MCNC: 8.7 MG/DL (ref 8.6–10.4)
CASTS UA: NORMAL /LPF
CASTS UA: NORMAL /LPF
CHLORIDE BLD-SCNC: 94 MMOL/L (ref 98–107)
CHLORIDE BLD-SCNC: 94 MMOL/L (ref 98–107)
CO2: 19 MMOL/L (ref 20–31)
CO2: 19 MMOL/L (ref 20–31)
COLOR: ABNORMAL
CREAT SERPL-MCNC: 0.68 MG/DL (ref 0.7–1.2)
CREAT SERPL-MCNC: 0.7 MG/DL (ref 0.7–1.2)
DATE, STOOL #1: ABNORMAL
EOSINOPHILS RELATIVE PERCENT: 0 % (ref 0–4)
EOSINOPHILS RELATIVE PERCENT: 2 % (ref 0–4)
EPITHELIAL CELLS UA: NORMAL /HPF
ETHANOL PERCENT: <0.01 %
ETHANOL: <10 MG/DL
GFR AFRICAN AMERICAN: >60 ML/MIN
GFR AFRICAN AMERICAN: >60 ML/MIN
GFR NON-AFRICAN AMERICAN: >60 ML/MIN
GFR NON-AFRICAN AMERICAN: >60 ML/MIN
GFR SERPL CREATININE-BSD FRML MDRD: ABNORMAL ML/MIN/{1.73_M2}
GFR SERPL CREATININE-BSD FRML MDRD: ABNORMAL ML/MIN/{1.73_M2}
GLUCOSE BLD-MCNC: 112 MG/DL (ref 70–99)
GLUCOSE BLD-MCNC: 91 MG/DL (ref 70–99)
GLUCOSE URINE: NEGATIVE
HCT VFR BLD CALC: 18.6 % (ref 41–53)
HCT VFR BLD CALC: 22 % (ref 41–53)
HEMOCCULT SP1 STL QL: POSITIVE
HEMOGLOBIN: 5.6 G/DL (ref 13.5–17.5)
HEMOGLOBIN: 6.6 G/DL (ref 13.5–17.5)
INFLUENZA A: NOT DETECTED
INFLUENZA B: NOT DETECTED
INR BLD: 1.5
KETONES, URINE: ABNORMAL
LEUKOCYTE ESTERASE, URINE: ABNORMAL
LIPASE: 23 U/L (ref 13–60)
LYMPHOCYTES # BLD: 14 % (ref 24–44)
LYMPHOCYTES # BLD: 2 % (ref 24–44)
MAGNESIUM: 1.5 MG/DL (ref 1.6–2.6)
MCH RBC QN AUTO: 24.8 PG (ref 26–34)
MCH RBC QN AUTO: 24.9 PG (ref 26–34)
MCHC RBC AUTO-ENTMCNC: 30.1 G/DL (ref 31–37)
MCHC RBC AUTO-ENTMCNC: 30.1 G/DL (ref 31–37)
MCV RBC AUTO: 82.2 FL (ref 80–100)
MCV RBC AUTO: 82.8 FL (ref 80–100)
MONOCYTES # BLD: 13 % (ref 1–7)
MONOCYTES # BLD: 5 % (ref 1–7)
MORPHOLOGY: ABNORMAL
NITRITE, URINE: NEGATIVE
PDW BLD-RTO: 19.2 % (ref 11.5–14.9)
PDW BLD-RTO: 19.6 % (ref 11.5–14.9)
PH UA: 5.5 (ref 5–8)
PLATELET # BLD: 191 K/UL (ref 150–450)
PLATELET # BLD: 244 K/UL (ref 150–450)
PMV BLD AUTO: 6.1 FL (ref 6–12)
PMV BLD AUTO: 6.8 FL (ref 6–12)
POTASSIUM SERPL-SCNC: 4.2 MMOL/L (ref 3.7–5.3)
POTASSIUM SERPL-SCNC: 4.4 MMOL/L (ref 3.7–5.3)
PRO-BNP: 787 PG/ML
PROTEIN UA: NEGATIVE
PROTHROMBIN TIME: 17.7 SEC (ref 11.8–14.6)
RBC # BLD: 2.26 M/UL (ref 4.5–5.9)
RBC # BLD: 2.65 M/UL (ref 4.5–5.9)
RBC UA: NORMAL /HPF
SARS-COV-2 RNA, RT PCR: NOT DETECTED
SEG NEUTROPHILS: 78 % (ref 36–66)
SEG NEUTROPHILS: 85 % (ref 36–66)
SEGMENTED NEUTROPHILS ABSOLUTE COUNT: 4.99 K/UL (ref 1.3–9.1)
SEGMENTED NEUTROPHILS ABSOLUTE COUNT: 5.27 K/UL (ref 1.3–9.1)
SODIUM BLD-SCNC: 123 MMOL/L (ref 135–144)
SODIUM BLD-SCNC: 125 MMOL/L (ref 135–144)
SOURCE: NORMAL
SPECIFIC GRAVITY UA: 1.02 (ref 1–1.03)
SPECIMEN DESCRIPTION: NORMAL
THYROXINE, FREE: 1.31 NG/DL (ref 0.93–1.7)
TIME, STOOL #1: ABNORMAL
TOTAL PROTEIN: 4.9 G/DL (ref 6.4–8.3)
TOTAL PROTEIN: 5.4 G/DL (ref 6.4–8.3)
TROPONIN, HIGH SENSITIVITY: 22 NG/L (ref 0–22)
TSH SERPL DL<=0.05 MIU/L-ACNC: 3.05 UIU/ML (ref 0.3–5)
TURBIDITY: CLEAR
URINE HGB: NEGATIVE
UROBILINOGEN, URINE: NORMAL
WBC # BLD: 6.2 K/UL (ref 3.5–11)
WBC # BLD: 6.4 K/UL (ref 3.5–11)
WBC UA: NORMAL /HPF

## 2022-09-12 PROCEDURE — 87636 SARSCOV2 & INF A&B AMP PRB: CPT

## 2022-09-12 PROCEDURE — 84484 ASSAY OF TROPONIN QUANT: CPT

## 2022-09-12 PROCEDURE — 85025 COMPLETE CBC W/AUTO DIFF WBC: CPT

## 2022-09-12 PROCEDURE — 85610 PROTHROMBIN TIME: CPT

## 2022-09-12 PROCEDURE — 86920 COMPATIBILITY TEST SPIN: CPT

## 2022-09-12 PROCEDURE — 82270 OCCULT BLOOD FECES: CPT

## 2022-09-12 PROCEDURE — 93005 ELECTROCARDIOGRAM TRACING: CPT | Performed by: STUDENT IN AN ORGANIZED HEALTH CARE EDUCATION/TRAINING PROGRAM

## 2022-09-12 PROCEDURE — 83540 ASSAY OF IRON: CPT

## 2022-09-12 PROCEDURE — 81001 URINALYSIS AUTO W/SCOPE: CPT

## 2022-09-12 PROCEDURE — 99285 EMERGENCY DEPT VISIT HI MDM: CPT

## 2022-09-12 PROCEDURE — 86901 BLOOD TYPING SEROLOGIC RH(D): CPT

## 2022-09-12 PROCEDURE — 83735 ASSAY OF MAGNESIUM: CPT

## 2022-09-12 PROCEDURE — 80053 COMPREHEN METABOLIC PANEL: CPT

## 2022-09-12 PROCEDURE — G0480 DRUG TEST DEF 1-7 CLASSES: HCPCS

## 2022-09-12 PROCEDURE — 6360000002 HC RX W HCPCS: Performed by: STUDENT IN AN ORGANIZED HEALTH CARE EDUCATION/TRAINING PROGRAM

## 2022-09-12 PROCEDURE — 71045 X-RAY EXAM CHEST 1 VIEW: CPT

## 2022-09-12 PROCEDURE — 84443 ASSAY THYROID STIM HORMONE: CPT

## 2022-09-12 PROCEDURE — 87086 URINE CULTURE/COLONY COUNT: CPT

## 2022-09-12 PROCEDURE — 86850 RBC ANTIBODY SCREEN: CPT

## 2022-09-12 PROCEDURE — 86900 BLOOD TYPING SEROLOGIC ABO: CPT

## 2022-09-12 PROCEDURE — 84439 ASSAY OF FREE THYROXINE: CPT

## 2022-09-12 PROCEDURE — 2580000003 HC RX 258: Performed by: STUDENT IN AN ORGANIZED HEALTH CARE EDUCATION/TRAINING PROGRAM

## 2022-09-12 PROCEDURE — 83550 IRON BINDING TEST: CPT

## 2022-09-12 PROCEDURE — 83690 ASSAY OF LIPASE: CPT

## 2022-09-12 PROCEDURE — 83880 ASSAY OF NATRIURETIC PEPTIDE: CPT

## 2022-09-12 PROCEDURE — 96365 THER/PROPH/DIAG IV INF INIT: CPT

## 2022-09-12 PROCEDURE — 82140 ASSAY OF AMMONIA: CPT

## 2022-09-12 PROCEDURE — 36415 COLL VENOUS BLD VENIPUNCTURE: CPT

## 2022-09-12 RX ORDER — SODIUM CHLORIDE 9 MG/ML
INJECTION, SOLUTION INTRAVENOUS PRN
Status: DISCONTINUED | OUTPATIENT
Start: 2022-09-12 | End: 2022-09-16 | Stop reason: HOSPADM

## 2022-09-12 RX ORDER — OCTREOTIDE ACETATE 50 UG/ML
50 INJECTION, SOLUTION INTRAVENOUS; SUBCUTANEOUS ONCE
Status: COMPLETED | OUTPATIENT
Start: 2022-09-12 | End: 2022-09-13

## 2022-09-12 RX ORDER — OCTREOTIDE ACETATE 50 UG/ML
50 INJECTION, SOLUTION INTRAVENOUS; SUBCUTANEOUS ONCE
Status: DISCONTINUED | OUTPATIENT
Start: 2022-09-12 | End: 2022-09-12

## 2022-09-12 RX ADMIN — CEFTRIAXONE SODIUM 1000 MG: 1 INJECTION, POWDER, FOR SOLUTION INTRAMUSCULAR; INTRAVENOUS at 23:18

## 2022-09-12 ASSESSMENT — ENCOUNTER SYMPTOMS
DIARRHEA: 0
NAUSEA: 0
CHEST TIGHTNESS: 0
VOMITING: 0
EYE DISCHARGE: 0
ABDOMINAL PAIN: 0
SHORTNESS OF BREATH: 0
SORE THROAT: 0
EYE REDNESS: 0
RHINORRHEA: 0

## 2022-09-12 ASSESSMENT — PAIN SCALES - GENERAL: PAINLEVEL_OUTOF10: 0

## 2022-09-12 ASSESSMENT — LIFESTYLE VARIABLES
HOW MANY STANDARD DRINKS CONTAINING ALCOHOL DO YOU HAVE ON A TYPICAL DAY: PATIENT DOES NOT DRINK
HOW OFTEN DO YOU HAVE A DRINK CONTAINING ALCOHOL: NEVER

## 2022-09-12 ASSESSMENT — PAIN - FUNCTIONAL ASSESSMENT
PAIN_FUNCTIONAL_ASSESSMENT: 0-10
PAIN_FUNCTIONAL_ASSESSMENT: NONE - DENIES PAIN

## 2022-09-12 NOTE — TELEPHONE ENCOUNTER
Writer got a call after hours at 4:45 pm stating she had [place] Frannie on the line  calling from the lab with Critical results. When writer was connect to Levar Kathleen she stats she has been trying to give this info to us. She said she called at 3:30 and left a message and no one called her back. She called the on call Doctor and Nagi Perez and she stated they are inpatient only and finally got me. Patient has a critical low Hemoglobin of 6.6. Writer called patient to let him know and he says he could tell, and writer asked him to go to the ER as Dr Genesis Miranda was out of town and the on call Doctor can give the order for an infusion. Patient states he will go to the ER tonight.

## 2022-09-13 PROBLEM — K92.2 GI BLEED: Status: ACTIVE | Noted: 2022-09-13

## 2022-09-13 LAB
EKG ATRIAL RATE: 89 BPM
EKG P AXIS: 30 DEGREES
EKG P-R INTERVAL: 152 MS
EKG Q-T INTERVAL: 364 MS
EKG QRS DURATION: 68 MS
EKG QTC CALCULATION (BAZETT): 442 MS
EKG R AXIS: 45 DEGREES
EKG T AXIS: 6 DEGREES
EKG VENTRICULAR RATE: 89 BPM
HCT VFR BLD CALC: 21.9 % (ref 41–53)
HCT VFR BLD CALC: 24.2 % (ref 41–53)
HCT VFR BLD CALC: 25.5 % (ref 41–53)
HEMOGLOBIN: 6.7 G/DL (ref 13.5–17.5)
HEMOGLOBIN: 7.6 G/DL (ref 13.5–17.5)
HEMOGLOBIN: 8 G/DL (ref 13.5–17.5)
IRON SATURATION: 15 % (ref 20–55)
IRON: 29 UG/DL (ref 59–158)
TOTAL IRON BINDING CAPACITY: 191 UG/DL (ref 250–450)
UNSATURATED IRON BINDING CAPACITY: 162 UG/DL (ref 112–347)

## 2022-09-13 PROCEDURE — 99223 1ST HOSP IP/OBS HIGH 75: CPT | Performed by: INTERNAL MEDICINE

## 2022-09-13 PROCEDURE — 93010 ELECTROCARDIOGRAM REPORT: CPT | Performed by: INTERNAL MEDICINE

## 2022-09-13 PROCEDURE — 2580000003 HC RX 258: Performed by: NURSE PRACTITIONER

## 2022-09-13 PROCEDURE — 6370000000 HC RX 637 (ALT 250 FOR IP): Performed by: NURSE PRACTITIONER

## 2022-09-13 PROCEDURE — 6360000002 HC RX W HCPCS: Performed by: STUDENT IN AN ORGANIZED HEALTH CARE EDUCATION/TRAINING PROGRAM

## 2022-09-13 PROCEDURE — A4216 STERILE WATER/SALINE, 10 ML: HCPCS | Performed by: NURSE PRACTITIONER

## 2022-09-13 PROCEDURE — 85014 HEMATOCRIT: CPT

## 2022-09-13 PROCEDURE — 6360000002 HC RX W HCPCS: Performed by: NURSE PRACTITIONER

## 2022-09-13 PROCEDURE — 36415 COLL VENOUS BLD VENIPUNCTURE: CPT

## 2022-09-13 PROCEDURE — 85018 HEMOGLOBIN: CPT

## 2022-09-13 PROCEDURE — 2580000003 HC RX 258: Performed by: STUDENT IN AN ORGANIZED HEALTH CARE EDUCATION/TRAINING PROGRAM

## 2022-09-13 PROCEDURE — 86900 BLOOD TYPING SEROLOGIC ABO: CPT

## 2022-09-13 PROCEDURE — APPNB30 APP NON BILLABLE TIME 0-30 MINS: Performed by: NURSE PRACTITIONER

## 2022-09-13 PROCEDURE — C9113 INJ PANTOPRAZOLE SODIUM, VIA: HCPCS | Performed by: NURSE PRACTITIONER

## 2022-09-13 PROCEDURE — C9113 INJ PANTOPRAZOLE SODIUM, VIA: HCPCS | Performed by: STUDENT IN AN ORGANIZED HEALTH CARE EDUCATION/TRAINING PROGRAM

## 2022-09-13 PROCEDURE — P9016 RBC LEUKOCYTES REDUCED: HCPCS

## 2022-09-13 PROCEDURE — 36430 TRANSFUSION BLD/BLD COMPNT: CPT

## 2022-09-13 PROCEDURE — 2060000000 HC ICU INTERMEDIATE R&B

## 2022-09-13 RX ORDER — NADOLOL 20 MG/1
20 TABLET ORAL DAILY
Status: DISCONTINUED | OUTPATIENT
Start: 2022-09-13 | End: 2022-09-16 | Stop reason: HOSPADM

## 2022-09-13 RX ORDER — FUROSEMIDE 20 MG/1
20 TABLET ORAL 2 TIMES DAILY
Status: DISCONTINUED | OUTPATIENT
Start: 2022-09-13 | End: 2022-09-16 | Stop reason: HOSPADM

## 2022-09-13 RX ORDER — ONDANSETRON 2 MG/ML
4 INJECTION INTRAMUSCULAR; INTRAVENOUS EVERY 6 HOURS PRN
Status: DISCONTINUED | OUTPATIENT
Start: 2022-09-13 | End: 2022-09-16 | Stop reason: HOSPADM

## 2022-09-13 RX ORDER — FLUOXETINE HYDROCHLORIDE 20 MG/1
20 CAPSULE ORAL DAILY
Status: DISCONTINUED | OUTPATIENT
Start: 2022-09-13 | End: 2022-09-16 | Stop reason: HOSPADM

## 2022-09-13 RX ORDER — SODIUM CHLORIDE 9 MG/ML
INJECTION, SOLUTION INTRAVENOUS PRN
Status: DISCONTINUED | OUTPATIENT
Start: 2022-09-13 | End: 2022-09-16 | Stop reason: HOSPADM

## 2022-09-13 RX ORDER — SODIUM CHLORIDE 0.9 % (FLUSH) 0.9 %
5-40 SYRINGE (ML) INJECTION PRN
Status: DISCONTINUED | OUTPATIENT
Start: 2022-09-13 | End: 2022-09-16 | Stop reason: HOSPADM

## 2022-09-13 RX ORDER — SODIUM CHLORIDE 9 MG/ML
INJECTION, SOLUTION INTRAVENOUS CONTINUOUS
Status: DISCONTINUED | OUTPATIENT
Start: 2022-09-13 | End: 2022-09-16 | Stop reason: HOSPADM

## 2022-09-13 RX ORDER — SPIRONOLACTONE 25 MG/1
100 TABLET ORAL 3 TIMES DAILY
Status: DISCONTINUED | OUTPATIENT
Start: 2022-09-13 | End: 2022-09-16 | Stop reason: HOSPADM

## 2022-09-13 RX ORDER — SODIUM CHLORIDE 0.9 % (FLUSH) 0.9 %
5-40 SYRINGE (ML) INJECTION EVERY 12 HOURS SCHEDULED
Status: DISCONTINUED | OUTPATIENT
Start: 2022-09-13 | End: 2022-09-16 | Stop reason: HOSPADM

## 2022-09-13 RX ORDER — MIDODRINE HYDROCHLORIDE 10 MG/1
10 TABLET ORAL
Status: DISCONTINUED | OUTPATIENT
Start: 2022-09-13 | End: 2022-09-16 | Stop reason: HOSPADM

## 2022-09-13 RX ORDER — ONDANSETRON 4 MG/1
4 TABLET, ORALLY DISINTEGRATING ORAL EVERY 8 HOURS PRN
Status: DISCONTINUED | OUTPATIENT
Start: 2022-09-13 | End: 2022-09-16 | Stop reason: HOSPADM

## 2022-09-13 RX ADMIN — FLUOXETINE 20 MG: 20 CAPSULE ORAL at 14:05

## 2022-09-13 RX ADMIN — OCTREOTIDE ACETATE 50 MCG: 50 INJECTION, SOLUTION INTRAVENOUS; SUBCUTANEOUS at 02:56

## 2022-09-13 RX ADMIN — SODIUM CHLORIDE: 9 INJECTION, SOLUTION INTRAVENOUS at 15:27

## 2022-09-13 RX ADMIN — SODIUM CHLORIDE 80 MG: 9 INJECTION, SOLUTION INTRAVENOUS at 01:04

## 2022-09-13 RX ADMIN — OCTREOTIDE ACETATE 50 MCG/HR: 500 INJECTION, SOLUTION INTRAVENOUS; SUBCUTANEOUS at 10:34

## 2022-09-13 RX ADMIN — CEFTRIAXONE SODIUM 1000 MG: 1 INJECTION, POWDER, FOR SOLUTION INTRAMUSCULAR; INTRAVENOUS at 23:04

## 2022-09-13 RX ADMIN — ONDANSETRON 4 MG: 2 INJECTION INTRAMUSCULAR; INTRAVENOUS at 02:49

## 2022-09-13 RX ADMIN — OCTREOTIDE ACETATE 50 MCG/HR: 500 INJECTION, SOLUTION INTRAVENOUS; SUBCUTANEOUS at 02:43

## 2022-09-13 RX ADMIN — SODIUM CHLORIDE: 9 INJECTION, SOLUTION INTRAVENOUS at 02:42

## 2022-09-13 RX ADMIN — MIDODRINE HYDROCHLORIDE 10 MG: 10 TABLET ORAL at 18:25

## 2022-09-13 RX ADMIN — SODIUM CHLORIDE, PRESERVATIVE FREE 10 ML: 5 INJECTION INTRAVENOUS at 20:33

## 2022-09-13 RX ADMIN — SODIUM CHLORIDE 40 MG: 9 INJECTION INTRAMUSCULAR; INTRAVENOUS; SUBCUTANEOUS at 18:26

## 2022-09-13 RX ADMIN — SODIUM CHLORIDE, PRESERVATIVE FREE 10 ML: 5 INJECTION INTRAVENOUS at 09:11

## 2022-09-13 RX ADMIN — SODIUM CHLORIDE: 9 INJECTION, SOLUTION INTRAVENOUS at 21:53

## 2022-09-13 RX ADMIN — OCTREOTIDE ACETATE 50 MCG/HR: 500 INJECTION, SOLUTION INTRAVENOUS; SUBCUTANEOUS at 20:38

## 2022-09-13 ASSESSMENT — PAIN DESCRIPTION - ORIENTATION: ORIENTATION: LEFT

## 2022-09-13 ASSESSMENT — ENCOUNTER SYMPTOMS
ABDOMINAL PAIN: 0
COUGH: 0
BACK PAIN: 0
SHORTNESS OF BREATH: 1
CONSTIPATION: 0
WHEEZING: 0
SORE THROAT: 0
NAUSEA: 0
DIARRHEA: 1
VOMITING: 0
BLOOD IN STOOL: 1

## 2022-09-13 ASSESSMENT — PAIN DESCRIPTION - LOCATION: LOCATION: RIB CAGE

## 2022-09-13 ASSESSMENT — PAIN SCALES - GENERAL: PAINLEVEL_OUTOF10: 4

## 2022-09-13 ASSESSMENT — PAIN DESCRIPTION - DESCRIPTORS: DESCRIPTORS: ACHING

## 2022-09-13 NOTE — PLAN OF CARE
Receiving 2 units of blood today    Problem: Discharge Planning  Goal: Discharge to home or other facility with appropriate resources  Outcome: Progressing   Tbd    Problem: Pain  Goal: Verbalizes/displays adequate comfort level or baseline comfort level  Outcome: Progressing   Mild pain in left rib d/t fall week ago    Problem: Safety - Adult  Goal: Free from fall injury  Outcome: Progressing   Bed alarm utilized d/t fall hx.      Problem: ABCDS Injury Assessment  Goal: Absence of physical injury  Outcome: Progressing   met

## 2022-09-13 NOTE — PLAN OF CARE
PRE CONSULT ROUNDING NOTE  HPI  60year old male with pmh of cirrhosis secondary to etoh abuse, iron deficiency anemia hepatitis c treated with harvoni GERD esophageal cancer (treated with chemo/radiation), esohpageal varices ,htn who presented to the ED for dizziness and shortness of breath. He was found to have hgb of 6 and a UTI. Reports multiple episodes of melena for one week with epigastric pain. He is not on anticoagulation meds and denies NSAID use. No BM since last night, hgb up to 6.7 and he is on his 2nd unit of blood. He takes lactulose at home with 40 mg lasix daily and 300mg of aldactone, he states he is compliant with his meds. Na 123 creat normal  bili 1.6, the rest of the lft are normal plt normal inr 1.5 iron 29 with 15% saturation  afp 2.5 on 4/13/22. Most recent ab ct on 7/25/22 did not show overt liver lesions. denies fevers chills dysphagia weight loss hematemesis hematochezia rash. Change in the bowel habits    He denies fevers chills weight loss dysphagia hematochezia hematemesis change in the bowel habits rash.    Endoscopy 6/2/22 egd (bleibel) healing post banding ulcers small low risk EV portal hypertensive gastropathy 4/16/22 colonoscopy (pangular)small hemorrhoids flat polyp right colon bx showed tubular adenoma, lesion in the sigmoid bx showed adenoma with acute inflammation    Family reports dad with liver disease, no stomach colon or liver cancer mom had pancreatic cancer,  no uc/crohns  Social quit  smoking quit etoh or illicit drugs   BP 13/74   Pulse 73   Temp 98.1 °F (36.7 °C) (Oral)   Resp 18   Ht 5' 10\" (1.778 m)   Wt 181 lb 7 oz (82.3 kg)   SpO2 99%   BMI 26.03 kg/m²     ROS as above meds labs imaging and past medical records were reviewed    Exam  General Appearance: alert and oriented to person, place and time, well-developed and well-nourished, in no acute distress  Skin: warm and dry, no rash or erythema  Head: normocephalic and atraumatic  Eyes: pupils equal, round, and reactive to light, extraocular eye movements intact, conjunctivae normal  ENT: hearing grossly normal bilaterally  Neck: neck supple and non tender without mass, no thyromegaly or thyroid nodules, no cervical lymphadenopathy   Pulmonary/Chest: clear to auscultation bilaterally- no wheezes, rales or rhonchi, normal air movement, no respiratory distress  Cardiovascular: normal rate, regular rhythm, normal S1 and S2, no murmurs, rubs, clicks or gallops, distal pulses intact, no carotid bruits  Abdomen: soft,  non tender, non-distended, normal bowel sounds, no masses or organomegaly no ascites  Extremities: no cyanosis, clubbing or edema  Musculoskeletal: normal range of motion, no joint swelling, deformity or tenderness  Neurologic: no cranial nerve deficit and muscle strength normal    Assessment  Anemia with melena  UTI  Cirrhosis with ascites secondary to alcohol abuse and treated hepatitis c  Hx esophageal cancer  Anemia hx iron deficiency    Plan  Will d/w md  Ok to eat today -2gm low salt diet and npo after mn for egd tomorrow  Continue sandostatin and abx  ppi  Trend hh and keep hgb >7  Avoid sedatives and narcotics  Formal gi consult to follow  . Vibha Stoddard, APRN - CNP

## 2022-09-13 NOTE — CONSULTS
GI Consult   Initial Gastroenterology/Hepatology Consultation Note    IDENTIFYING DATA   PATIENT:  Tanesha Alves  MRN:  [id number]  ADMIT DATE: 9/12/2022  TIME OF EVALUATION: 9/13/2022 12:06 PM  Reason for Consult: Decompensated cirrhosis of liver. HISTORY OF PRESENT ILLNESS   Tanesha Alves is a 60 y.o. male with a history of hepatitis B and decompensated cirrhosis of liver presented to emergency room with shortness of breath and dizziness. Patient had a routine blood check and was noted to have hemoglobin of 6.6 g/dL. He was called by the gastroenterology team to come to emergency room. Patient reports having exertional dyspnea and dark stool over past 1 week and also diarrhea but it has resolved. Patient had prior endoscopy in June 2022 and was told he has esophageal varices but they were small. The patient states that he gets weekly paracentesis but denies any abdominal distention at this time. Denies any history of confusion. No other complaints at this time. GI HISTORY     SUMMARY TABLE    Last EGD June 2022 moderate portal hypertensive gastropathy, healing post banding ulcers. Small varices.    Last colonoscopy    Hemorrhoids   Primary GI physician        PAST MEDICAL, SURGICAL, FAMILY, and SOCIAL HISTORY     Past Medical History:   Diagnosis Date    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 ([address]     Alcoholic cirrhosis of liver with ascites ([address]     Anemia 04/13/2022    Anxiety     Arthritis     Back pain, chronic     dr. Petros Whiting, orthopedic, every 3-4 months, gets steroid injection    Lezama esophagus     BPH (benign prostatic hypertrophy)     Cholelithiasis     Cirrhosis ([address]     COVID-19 12/2020    pt reports he had a positive test while at [facility] in [facility], was asymptomatic    COVID-19 vaccine series completed 5/20/2021, 6/22/2021    Moderna 5/20/2021, 6/22/2021    DDD (degenerative disc disease), lumbar     Depression     Esophageal cancer ([address]     INVASIVE ADENOCARCINOMA ARISING IN TUBULAR ADENOMA WITH HIGH GRADE DYSPLASIA, ASSOCIATED WITH FOCAL INTESTINAL METAPLASIA     Esophageal varices (HCC)     Fatty liver     GERD (gastroesophageal reflux disease)     Hep C w/o coma, chronic ([address]     History of alcohol abuse     6-12 beers a day; quit drinking 2019    History of blood transfusion     History of colon polyps 2016    History of tobacco abuse     [place] (hard of hearing)     Hyperlipidemia     Hypertension     Hyponatremia 07/20/2016    Port-A-Cath in place     right upper chest    Portal hypertension ([address]     Sciatica     Secondary esophageal varices ([address] 06/07/2022    Shortness of breath     Spinal stenosis     Stomach ulcer     hx of    Tubular adenoma of colon 2016, 2018    Vitamin D deficiency     Wears glasses      Past Surgical History:   Procedure Laterality Date    BUNIONECTOMY      twice on right side    BUNIONECTOMY Left     CARPAL TUNNEL RELEASE Right     COLONOSCOPY      at age 35    COLONOSCOPY  10/05/2016    polyps-pathology tubular adenoma, and abnormal looking mucosa right colon-pathology-tubular adenoma    COLONOSCOPY N/A 03/30/2018    COLONOSCOPY POLYPECTOMY COLD BIOPSY performed by Ora Dakin, MD at [place] [address]  03/30/2018    Small polyp in the sigmoid colon and excised with biopsy forceps--tubular adenoma    COLONOSCOPY N/A 04/16/2022    COLONOSCOPY POLYPECTOMY performed by Ora Dakin, MD at [facility], DIAGNOSTIC      EGD    IR PORT PLACEMENT EQUAL OR GREATER THAN 5 YEARS  04/19/2021    IR PORT PLACEMENT EQUAL OR GREATER THAN 5 YEARS 4/19/2021 [facility] SPECIAL PROCEDURES    KNEE SURGERY Left     cyst removed    NASAL SEPTUM SURGERY      NERVE BLOCK Right 11/23/2020    NERVE BLOCK RIGHT CERVICAL STEROID INJECTION  C3-C6 performed by Jean Marie Garibay MD at [address] [place] [address]  01/04/2016    steroid injection C7 T1    OTHER SURGICAL HISTORY  11/21/2016    Bilateral Lumbar CACHORRO L4-L5 injections    OTHER SURGICAL HISTORY  12/19/2016 lumbar steroid injection    OTHER SURGICAL HISTORY  09/28/2018    BILATERAL L5 CACHORRO (N/A Back)    OTHER SURGICAL HISTORY Right 11/23/2020    cervical injection    PAIN MANAGEMENT PROCEDURE Left 07/09/2020    EPIDURAL STEROID INJECTION LEFT L4 L5 performed by Sandra Carpenter MD at [address] Left 07/20/2020    LEFT L4 L5 EPIDURAL STEROID INJECTION performed by Sandra Carpenter MD at [address] Bilateral 08/17/2020    LUMBAR FACET BILATERAL L2-L5 performed by Sandra Carpenter MD at [address] Bilateral 12/07/2020    NERVE BLOCK BILATERAL LUMBAR MEDIAL BRANCH L2-L5 performed by Sandra Carpenter MD at [facility]      [place]    [facility] [place] [address] AA&/STRD TFRML EPI LUMBAR/SACRAL 1 LEVEL Bilateral 09/06/2018    BILATERAL L5 CACHORRO performed by Sandra Carpenter MD at [facility] AA&/STRD TFRML EPI LUMBAR/SACRAL 1 LEVEL N/A 09/28/2018    BILATERAL L5 CACHORRO performed by Sandra Carpenter MD at [address] [place] [address] N/CARPAL TUNNEL SURG Right 08/29/2017    CARPAL TUNNEL RELEASE RIGHT performed by Tiffany Glover MD at [address] [place] [address] N/CARPAL TUNNEL SURG Left 10/31/2017    CARPAL TUNNEL RELEASE performed by Tiffany Glover MD at [place] [id number] 12/29/2020    EGD BIOPSY performed by Adrianne Carlson MD at [address] [place] [address] 02/02/2021    EGD BIOPSY and spot marking performed by Beltran Causey MD at [address] [place] [address] N/A 02/12/2021    ENDOSCOPIC ULTRASOUND, EGD performed by India Belcher MD at [place] [address]  02/12/2021    EGD DIAGNOSTIC ONLY performed by India Belcher MD at [place] [address] 08/31/2021    EGD BIOPSY performed by Beltran Causey MD at [address] [place] [address] 01/21/2022    EGD BIOPSY performed by Beltran Causey MD at [facility]    UPPER GASTROINTESTINAL ENDOSCOPY N/A 04/15/2022    EGD ESOPHAGOGASTRODUODENOSCOPY performed by Rene Irby MD at [address] [place] [address] N/A 2022    EGD BAND LIGATION performed by Bessy Velez MD at [address] [place] [address] N/A 2022    EGD ESOPHAGOGASTRODUODENOSCOPY performed by Bessy Velez MD at [facility] [address] EXTRACTION       Family History   Problem Relation Age of Onset    Cancer Mother         pancreatic    Cancer Father         bone    Diabetes Sister     Asthma Brother        Social History:    Social History     Tobacco Use    Smoking status: Former     Packs/day: 1.00     Years: 45.00     Pack years: 45.00     Types: Cigarettes     Quit date: 2017     Years since quittin.6    Smokeless tobacco: Never   Vaping Use    Vaping Use: Never used   Substance Use Topics    Alcohol use: Not Currently     Comment: Quit alcohol in 2019- heavier drinking prior to quitting    Drug use: Not Currently     Frequency: 1.0 times per week     Types: Cocaine     Comment: Cocaine- stopped spring 2016       MEDICATIONS   Allergies:    No Known Allergies    Home Medications:  Prior to Admission medications    Medication Sig Start Date End Date Taking? Authorizing Provider   nadolol (CORGARD) 20 MG tablet take 1 tablet by mouth once daily 22  Yes MASSIMO Keys NP   midodrine (PROAMATINE) 5 MG tablet Take 2 tablets by mouth in the morning and 2 tablets at noon and 2 tablets before bedtime.  22  Yes MASSIMO Keys NP   furosemide (LASIX) 20 MG tablet Take 1 tablet by mouth 2 times daily 22  Yes MASSIMO Keys NP   lactulose [facility]) 10 GM/15ML solution take 30 milliliters by mouth three times a day 22  Yes VASU Shields   FLUoxetine (PROZAC) 20 MG capsule Take 1 capsule by mouth daily 22  Yes Kun Smallwood MD   atorvastatin (LIPITOR) 20 MG tablet Take 1 tablet by mouth nightly 4/21/22  Yes Zack Sloan MD   ferrous sulfate (IRON 325) 325 (65 Fe) MG tablet Take 1 tablet by mouth 2 times daily 1/12/22  Yes VASU Ho   spironolactone (ALDACTONE) 100 MG tablet Take 1 tablet by mouth every evening  Patient taking differently: Take 100 mg by mouth 3 times daily 6/23/22   MASSIMO Horner - NP   ondansetron (ZOFRAN-ODT) 4 MG disintegrating tablet dissolve 1 tablet by mouth every 8 hours if needed for nausea and vomiting 5/31/22   VASU Ho       Current Medications:     midodrine  10 mg Oral TID WC    FLUoxetine  20 mg Oral Daily    [Held by provider] spironolactone  100 mg Oral TID    [Held by provider] nadolol  20 mg Oral Daily    [Held by provider] furosemide  20 mg Oral BID    sodium chloride flush  5-40 mL IntraVENous 2 times per day    cefTRIAXone (ROCEPHIN) IV  1,000 mg IntraVENous Q24H       Infusions:   sodium chloride 150 mL/hr at 09/13/22 0242    sodium chloride      octreotide (SandoSTATIN) infusion 50 mcg/hr (09/13/22 1034)    sodium chloride         PRNs:   sodium chloride flush, 5-40 mL, PRN  sodium chloride, , PRN  ondansetron, 4 mg, Q8H PRN   Or  ondansetron, 4 mg, Q6H PRN  sodium chloride, , PRN        REVIEW OF SYSTEMS   See HPI, otherwise ROS negative as below  CONSTITUTIONAL:  negative  HEENT:  negative  RESPIRATORY:  negative  CARDIOVASCULAR:  negative  GASTROINTESTINAL: as in HPI  GENITOURINARY:  negative  INTEGUMENT/BREAST:  negative  HEMATOLOGIC/LYMPHATIC:  negative  ALLERGIC/IMMUNOLOGIC:  negative  ENDOCRINE:  negative  MUSCULOSKELETAL:  negative  NEUROLOGICAL:  negative  BEHAVIOR/PSYCH:  negative     OBJECTIVE DATA   Vitals:    BP 95/64   Pulse 73   Temp 98.1 °F (36.7 °C) (Oral)   Resp 18   Ht 5' 10\" (1.778 m)   Wt 181 lb 7 oz (82.3 kg)   SpO2 99%   BMI 26.03 kg/m²     GEN: alert and oriented x3, NAD  HEENT: EOMI, PERRL, oropharynx non-erythematous without exudate, no oral lesions. Mild jaundice.   LYMPH: no lad  CV: RRR, no murmur, rub, gallop, no edema  PULM: CTAB  ABD: soft, non-tender, non-distended, +BS  NEURO: no focal deficits, moves all 4 extremities spontaneously  SKIN: no rashes  PSYCH: normal affect    LABS AND IMAGING   CBC  Recent Labs     09/12/22 1457 09/12/22  2301 09/13/22  0819   WBC 6.4 6.2  --    HGB 6.6* 5.6* 6.7*   HCT 22.0* 18.6* 21.9*   MCV 82.8 82.2  --     191  --        BMP  Recent Labs     09/12/22 1457 09/12/22  2301   * 123*   K 4.4 4.2   CL 94* 94*   CO2 19* 19*   BUN 11 13   CREATININE 0.68* 0.70   GLUCOSE 91 112*   CALCIUM 8.7 8.2*       LFTS  Recent Labs     09/12/22 1457 09/12/22  2301   ALKPHOS 134* 119   ALT 16 13   AST 32 27   PROT 5.4* 4.9*   BILITOT 1.8* 1.6*   LABALBU 3.2* 3.0*       AMYLASE/LIPASE/AMMONIA  Recent Labs     09/12/22  2301   LIPASE 23   AMMONIA 44       PT/INR  Recent Labs     09/12/22 2301   PROTIME 17.7*   INR 1.5       ANEMIA STUDIES  Recent Labs     09/12/22  2301   IRON 29*   LABIRON 15*   TIBC 191*   UIBC 162       Radiology    XR CHEST PORTABLE    Result Date: 9/12/2022  EXAMINATION: ONE XRAY VIEW OF THE CHEST 9/12/2022 6:53 pm COMPARISON: 04/26/2022 HISTORY: ORDERING SYSTEM PROVIDED HISTORY: weakness TECHNOLOGIST PROVIDED HISTORY: weakness Reason for Exam: fatigue, SOB, dizziness. patient complains of sharp pain on anterior left side of chest. patient stated he has low hemoglobin FINDINGS: The cardial-pericardial silhouette is unremarkable in appearance. The lungs are clear. No pneumothorax is found. No free air is seen. No acute bony abnormality. Mery catheter unchanged in position. Unremarkable portable chest radiograph. MICRO:   [unfilled]    EKG: No results found for this or any previous visit (from the past 4464 hour(s)). ECHO: No results found for this or any previous visit. IMAGING:    [unfilled]    ASSESSMENT AND PLAN   Fernando Orozco is a 60 y.o. male who presented with dizziness, lightheadedness and shortness of breath. Patient has history of decompensated cirrhosis of liver due to alcoholism and hepatitis B. He was noted to have hemoglobin of 6.6 g/dL. He reports dark stools over the last 1 week. Prior endoscopy in June 2022 showed mild to moderate portal hypertensive gastropathy and healing post banding ulcer. Varix small varices were also noted. Lab work showed hemoglobin of 5.6 g/dL. Baseline hemoglobin runs low around 7.6 to 8 g/dL. Acute on chronic anemia/dark stools: The source of blood loss could be portal hypertensive gastropathy versus peptic ulcer disease. Variceal bleed is thought to be less likely given patient remained hemodynamically stable. Recommendations:  Recommend Protonix 40 mg IV twice daily. Monitor hemoglobin. Transfuse to keep hemoglobin above 7 g/dL. Consider octreotide drip pending further evaluation. Patient will need optimization first before endoscopy. We will plan for EGD tomorrow. Decompensated cirrhosis of liver:   Continue ceftriaxone for 5 to 7 days for SBP prophylaxis. Continue diuretic. Resume beta-blocker once acute illness resolves. Check LFTs, PT/INR and BMP in a.m. to calculate MELD score.        William Wilks MD  Advanced Endoscopist  Pager: [phone]

## 2022-09-13 NOTE — PROGRESS NOTES
09/13/22 1049   Encounter Summary   Encounter Overview/Reason   Encounter   Service Provided For: Patient   Referral/Consult From: Ralph   Last Encounter  09/13/22   Complexity of Encounter Low   Begin Time 0915   End Time  0930   Total Time Calculated 15 min   Assessment/Intervention/Outcome   Outcome Refused/Declined  (patient declined Anointing)

## 2022-09-13 NOTE — CONSENT
Informed Consent for Blood Component Transfusion Note    I have discussed with the patient the rationale for blood component transfusion; its benefits in treating or preventing fatigue, organ damage, or death; and its risk which includes mild transfusion reactions, rare risk of blood borne infection, or more serious but rare reactions. I have discussed the alternatives to transfusion, including the risk and consequences of not receiving transfusion. The patient had an opportunity to ask questions and had agreed to proceed with transfusion of blood components.     Electronically signed by Isela Rey MD on 9/12/22 at 11:36 PM EDT

## 2022-09-13 NOTE — DISCHARGE INSTR - COC
HISTORY Right 11/23/2020    cervical injection    PAIN MANAGEMENT PROCEDURE Left 07/09/2020    EPIDURAL STEROID INJECTION LEFT L4 L5 performed by Brigid Pratt MD at [address] Left 07/20/2020    LEFT L4 L5 EPIDURAL STEROID INJECTION performed by Brigid Pratt MD at [address] Bilateral 08/17/2020    LUMBAR FACET BILATERAL L2-L5 performed by Brigid Pratt MD at [address] Bilateral 12/07/2020    NERVE BLOCK BILATERAL LUMBAR MEDIAL BRANCH L2-L5 performed by Brigid Pratt MD at [address] [facility] [address] AA&/STRD TFRML EPI LUMBAR/SACRAL 1 LEVEL Bilateral 09/06/2018    BILATERAL L5 CACHORRO performed by Brigid Pratt MD at [facility] AA&/STRD TFRML EPI LUMBAR/SACRAL 1 LEVEL N/A 09/28/2018    BILATERAL L5 CACHORRO performed by Brigid Pratt MD at [address] [place] [address] N/CARPAL TUNNEL SURG Right 08/29/2017    CARPAL TUNNEL RELEASE RIGHT performed by Ben Ng MD at [address] [place] [address] N/CARPAL TUNNEL SURG Left 10/31/2017    CARPAL TUNNEL RELEASE performed by Ben Ng MD at [address] [place] [address] 12/29/2020    EGD BIOPSY performed by Andrea Ritter MD at [address] [place] [address] 02/02/2021    EGD BIOPSY and spot marking performed by Meggan Camarillo MD at [address] [place] [address] N/A 02/12/2021    ENDOSCOPIC ULTRASOUND, EGD performed by Wen العلي MD at [address] [place] [address]  02/12/2021    EGD DIAGNOSTIC ONLY performed by Wen العلي MD at [address] [place] [address] 08/31/2021    EGD BIOPSY performed by Meggan Camarillo MD at [address] [place] [address] 01/21/2022    EGD BIOPSY performed by Meggan Camarillo MD at [address] [place] [address] 04/15/2022    EGD ESOPHAGOGASTRODUODENOSCOPY performed by Lucian Kristal Wyatt MD at [facility] [address] N/A 06/06/2022    EGD BAND LIGATION performed by Jorge Rodriguez MD at [facility] [address] N/A 06/09/2022    EGD [facility]OS[facility]         Immunization History:   Immunization History   Administered Date(s) Administered    COVID-19, MODERNA BLUE border, Primary or Immunocompromised, (age 12y+), IM, 100 mcg/0.5mL 05/20/2021, 06/22/2021    Hepatitis A 09/20/2016, 03/29/2017    Hepatitis B (Recombivax HB) 09/20/2016, 10/19/2016, 03/29/2017    Influenza 11/29/2012    Influenza, AFLURIA (age 1 yrs+), FLUZONE, (age 10 mo+), MDV, 0.5mL 09/13/2017, 01/24/2019    Influenza, FLUARIX, FLULAVAL, Sybil Silvio (age 10 mo+) AND AFLURIA, (age 1 y+), PF, 0.5mL 09/13/2016, 12/30/2020, 12/23/2021    PPD Test 07/25/2016, 04/23/2022    Pneumococcal Polysaccharide ([id number]) 11/29/2012, 07/25/2016    Tdap (Boostrix, Adacel) 04/06/2017, 06/15/2022       Active Problems:  Patient Active Problem List   Diagnosis Code    Back pain, chronic M54.9, G89.29    Hearing difficulty H91.90    GERD (gastroesophageal reflux disease) K21.9    Cervical radicular pain M54.12    Alcohol withdrawal syndrome without complication (HCC) A31.894    Hypomagnesemia E83.42    Chronic viral hepatitis B without delta agent and without coma (HCC) B18.1    Calculus of gallbladder without cholecystitis K80.20    Hep C w/o coma, chronic (HCC) B18.2    Fatty liver K76.0    Psychophysiologic insomnia F51.04    Cirrhosis (HCC) K74.60    Hepatic cirrhosis ([facility] [address] K74.60    Vertebrogenic low back pain M54.51    DDD (degenerative disc disease), lumbar M51.36    Depression F32. A    Tubular adenoma of colon D12.6    History of colon polyps Z86.010    Gynecomastia, male N62    Lumbar radiculitis M54.16    Lumbar disc herniation M51.26    Tinnitus H93.19    Eustachian tube dysfunction H69.80    Ganglion cyst M67.40 Carpal tunnel syndrome of right wrist G56.01    History of hepatitis C Z86.19    Vitamin D deficiency E55.9    Pure hypercholesterolemia E78.00    Hypokalemia E87.6    Essential hypertension I10    Recurrent major depressive disorder in partial remission (HCC) F33.41    S/P epidural steroid injection Z92.241    Elevated LFTs R79.89    Seasonal allergies J30.2    Lumbar facet arthropathy M47.816    Cervical facet syndrome M47.812    Acute gastrointestinal bleeding K92.2    Thrombocytopenia (HCC) D69.6    Hepatitis C virus infection resolved after antiviral drug therapy Z86.19    Gastrointestinal hemorrhage with melena K92.1    Alcohol abuse F10.10    Altered mental status R41.82    Hypocalcemia E83.51    Hypophosphatemia E83.39    Malignant neoplasm of lower third of esophagus (HCC) C15.5    COVID-19 U07.1    Anxiety F41.9    Current smoker F17.200    Severe comorbid illness R69    Gait instability R26.81    Abnormal findings on diagnostic imaging of spine R93.7    Cervical spinal stenosis M48.02    Spinal stenosis of lumbar region with neurogenic claudication M48.062    Low hemoglobin D64.9    Symptomatic anemia, microcytic, acute D64.9    Hypotension I95.9    Former smoker, 50+ pack years, quit 2016 Z87.891    HLD (hyperlipidemia) E78.5    Esophageal adenocarcinoma (HCC) C15.9    Anemia D64.9    Acute kidney injury ([address] N17.9    Ascites R18.8    Shortness of breath R06.02    Drop in hemoglobin R71.0    Portal hypertension (HCC) K76.6    Secondary esophageal varices (HCC) I85.10    Esophageal polyp K22.81    Acute kidney failure, unspecified (HCC) N17.9    Muscle weakness (generalized) M62.81    Other abnormalities of gait and mobility R26.89    GI bleed K92.2       Isolation/Infection:   Isolation            No Isolation          Patient Infection Status       Infection Onset Added Last Indicated Last Indicated By Review Planned Expiration Resolved Resolved By    None active    Resolved    COVID-19 (Rule Out) of Feeding: Oral  Liquids: No Restrictions  Daily Fluid Restriction: no  Last Modified Barium Swallow with Video (Video Swallowing Test): not done    Treatments at the Time of Hospital Discharge:   Respiratory Treatments: as needed  Oxygen Therapy:  is not on home oxygen therapy. Ventilator:    - No ventilator support    Rehab Therapies: Physical Therapy and Occupational Therapy  Weight Bearing Status/Restrictions: Other Medical Equipment (for information only, NOT a DME order): Other Treatments: Skilled Nursing assessment and monitoring. Medication education and monitoring per protocol. Patient's personal belongings (please select all that are sent with patient):  Glasses    RN SIGNATURE:  Electronically signed by Zbigniew Kim RN on 9/16/22 at 4:56 PM EDT    CASE MANAGEMENT/SOCIAL WORK SECTION    Inpatient Status Date: 9/13/22    Readmission Risk Assessment Score:  Readmission Risk              Risk of Unplanned Readmission:  28           Discharging to Facility/ . [address]  [address] [address]  Phone [phone]  Fax  [phone]    Dialysis Facility (if applicable)   Name:  Address:  Dialysis Schedule:  Phone:  Fax:    / signature: Electronically signed by Yovani Osborne RN on 9/13/22 at 12:07 PM EDT    PHYSICIAN SECTION    Prognosis: Good    Condition at Discharge: Stable    Rehab Potential (if transferring to Rehab): Good    Recommended Labs or Other Treatments After Discharge:     Physician Certification: I certify the above information and transfer of [place]  is necessary for the continuing treatment of the diagnosis listed and that he requires [address] for less 30 days.      Update Admission H&P: No change in H&P    PHYSICIAN SIGNATURE:  Electronically signed by Lucas Abebe MD on 9/16/22 at 8:35 AM EDT

## 2022-09-13 NOTE — ED PROVIDER NOTES
EMERGENCY DEPARTMENT ENCOUNTER    Pt Name: Francis Montano  MRN: [id number]  [place] 1960  Date of evaluation: 9/12/22  CHIEF COMPLAINT       Chief Complaint   Patient presents with    Shortness of Breath    Dizziness     HISTORY OF PRESENT ILLNESS   This is a 42-year-old history of hypertension, hep C and alcoholic cirrhosis presents with anemia    Patient had outpatient labs with a hemoglobin of 6.6    States he has been feeling lightheaded especially when he stands up somewhat shortness of breath with exertion    No chest pain or pressure    Dark black stools for the last week    No vomiting blood. No fevers chills abdominal pain or other symptoms            REVIEW OF SYSTEMS     Review of Systems   Constitutional:  Negative for chills and fever. HENT:  Negative for rhinorrhea and sore throat. Eyes:  Negative for discharge and redness. Respiratory:  Negative for chest tightness and shortness of breath. Cardiovascular:  Negative for chest pain. Gastrointestinal:  Negative for abdominal pain, diarrhea, nausea and vomiting. Dark black stool   Genitourinary:  Negative for dysuria and frequency. Musculoskeletal:  Negative for arthralgias and myalgias. Skin:  Negative for rash. Neurological:  Negative for weakness and numbness. Psychiatric/Behavioral:  Negative for suicidal ideas. All other systems reviewed and are negative.   PASTMEDICAL HISTORY     Past Medical History:   Diagnosis Date    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 ([address]     Alcoholic cirrhosis of liver with ascites ([address]     Anemia 04/13/2022    Anxiety     Arthritis     Back pain, chronic     dr. Courtney Duncan, orthopedic, every 3-4 months, gets steroid injection    Lezama esophagus     BPH (benign prostatic hypertrophy)     Cholelithiasis     Cirrhosis ([address]     COVID-19 12/2020    pt reports he had a positive test while at [facility] in 2020, was asymptomatic    COVID-19 vaccine series completed 5/20/2021, 6/22/2021    Americus Shanthi 5/20/2021, 6/22/2021    DDD (degenerative disc disease), lumbar     Depression     Esophageal cancer ([facility]     INVASIVE ADENOCARCINOMA ARISING IN TUBULAR ADENOMA WITH HIGH GRADE DYSPLASIA, ASSOCIATED WITH FOCAL INTESTINAL METAPLASIA     Esophageal varices (HCC)     Fatty liver     GERD (gastroesophageal reflux disease)     Hep C w/o coma, chronic ([facility]     History of alcohol abuse     6-12 beers a day; quit drinking 2019    History of blood transfusion     History of colon polyps 2016    History of tobacco abuse     [place] (hard of hearing)     Hyperlipidemia     Hypertension     Hyponatremia 07/20/2016    Port-A-Cath in place     right upper chest    Portal hypertension ([facility]     Sciatica     Secondary esophageal varices ([facility] 06/07/2022    Shortness of breath     Spinal stenosis     Stomach ulcer     hx of    Tubular adenoma of colon 2016, 2018    Vitamin D deficiency     Wears glasses      Past Problem List  Patient Active Problem List   Diagnosis Code    Back pain, chronic M54.9, G89.29    Hearing difficulty H91.90    GERD (gastroesophageal reflux disease) K21.9    Cervical radicular pain M54.12    Alcohol withdrawal syndrome without complication (HCC) H37.685    Hypomagnesemia E83.42    Chronic viral hepatitis B without delta agent and without coma (HCC) B18.1    Calculus of gallbladder without cholecystitis K80.20    Hep C w/o coma, chronic (HCC) B18.2    Fatty liver K76.0    Psychophysiologic insomnia F51.04    Cirrhosis ([address] K74.60    Hepatic cirrhosis ([facility] K74.60    Vertebrogenic low back pain M54.51    DDD (degenerative disc disease), lumbar M51.36    Depression F32. A    Tubular adenoma of colon D12.6    History of colon polyps Z86.010    Gynecomastia, male N62    Lumbar radiculitis M54.16    Lumbar disc herniation M51.26    Tinnitus H93.19    Eustachian tube dysfunction H69.80    Ganglion cyst M67.40    Carpal tunnel syndrome of right wrist G56.01    History of hepatitis C Z86.19    Vitamin D deficiency E55.9    Pure hypercholesterolemia E78.00    Hypokalemia E87.6    Essential hypertension I10    Recurrent major depressive disorder in partial remission (HCC) F33.41    S/P epidural steroid injection Z92.241    Elevated LFTs R79.89    Seasonal allergies J30.2    Lumbar facet arthropathy M47.816    Cervical facet syndrome M47.812    Acute gastrointestinal bleeding K92.2    Thrombocytopenia (HCC) D69.6    Hepatitis C virus infection resolved after antiviral drug therapy Z86.19    Gastrointestinal hemorrhage with melena K92.1    Alcohol abuse F10.10    Altered mental status R41.82    Hypocalcemia E83.51    Hypophosphatemia E83.39    Malignant neoplasm of lower third of esophagus (HCC) C15.5    COVID-19 U07.1    Anxiety F41.9    Current smoker F17.200    Severe comorbid illness R69    Gait instability R26.81    Abnormal findings on diagnostic imaging of spine R93.7    Cervical spinal stenosis M48.02    Spinal stenosis of lumbar region with neurogenic claudication M48.062    Low hemoglobin D64.9    Symptomatic anemia, microcytic, acute D64.9    Hypotension I95.9    Former smoker, 50+ pack years, quit 2016 Z87.891    HLD (hyperlipidemia) E78.5    Esophageal adenocarcinoma (HCC) C15.9    Anemia D64.9    Acute kidney injury ([address] N17.9    Ascites R18.8    Shortness of breath R06.02    Drop in hemoglobin R71.0    Portal hypertension (HCC) K76.6    Secondary esophageal varices (HCC) I85.10    Esophageal polyp K22.81    Acute kidney failure, unspecified (HCC) N17.9    Muscle weakness (generalized) M62.81    Other abnormalities of gait and mobility R26.89     SURGICAL HISTORY       Past Surgical History:   Procedure Laterality Date    BUNIONECTOMY      twice on right side    BUNIONECTOMY Left     CARPAL TUNNEL RELEASE Right     COLONOSCOPY      at age 35    COLONOSCOPY  10/05/2016    polyps-pathology tubular adenoma, and abnormal looking mucosa right colon-pathology-tubular adenoma    COLONOSCOPY N/A 03/30/2018    COLONOSCOPY POLYPECTOMY COLD BIOPSY performed by Benji Buitrago MD at [address] [place] [address]  03/30/2018    Small polyp in the sigmoid colon and excised with biopsy forceps--tubular adenoma    COLONOSCOPY N/A 04/16/2022    COLONOSCOPY POLYPECTOMY performed by Benji Buitrago MD at [place] [address], COLON, DIAGNOSTIC      EGD    IR PORT PLACEMENT EQUAL OR GREATER THAN 5 YEARS  04/19/2021    IR PORT PLACEMENT EQUAL OR GREATER THAN 5 YEARS 4/19/2021 [facility] SPECIAL PROCEDURES    KNEE SURGERY Left     cyst removed    NASAL SEPTUM SURGERY      NERVE BLOCK Right 11/23/2020    NERVE BLOCK RIGHT CERVICAL STEROID INJECTION  C3-C6 performed by Betty Brandon MD at [address]  01/04/2016    steroid injection C7 T1    OTHER SURGICAL HISTORY  11/21/2016    Bilateral Lumbar CACHORRO L4-L5 injections    OTHER SURGICAL HISTORY  12/19/2016    lumbar steroid injection    OTHER SURGICAL HISTORY  09/28/2018    BILATERAL L5 CACHORRO (N/A Back)    OTHER SURGICAL HISTORY Right 11/23/2020    cervical injection    PAIN MANAGEMENT PROCEDURE Left 07/09/2020    EPIDURAL STEROID INJECTION LEFT L4 L5 performed by Betty Brandon MD at [address] Left 07/20/2020    LEFT L4 L5 EPIDURAL STEROID INJECTION performed by Betty Brandon MD at [address] [place] [address] Bilateral 08/17/2020    LUMBAR FACET BILATERAL L2-L5 performed by Betty Brandon MD at [address] [place] [address] Bilateral 12/07/2020    NERVE BLOCK BILATERAL LUMBAR MEDIAL BRANCH L2-L5 performed by Betty Brandon MD at [facility]      [address] AA&/STRD TFRML EPI LUMBAR/SACRAL 1 LEVEL Bilateral 09/06/2018    BILATERAL L5 CACHORRO performed by Betty Brandon MD at [facility] AA&/STRD TFRML EPI LUMBAR/SACRAL 1 LEVEL N/A 09/28/2018    BILATERAL L5 CACHORRO performed by Betty Brandon MD at [facility] N/CARPAL [address] Right 08/29/2017    CARPAL TUNNEL RELEASE RIGHT performed by Kimani Gonzáles MD at [facility] N/CARPAL TUNNEL SURG Left 10/31/2017    CARPAL TUNNEL RELEASE performed by Solange Blanchard MD at [address] [place] [address] 12/29/2020    EGD BIOPSY performed by Andres Villeda MD at [address] [place] [address] 02/02/2021    EGD BIOPSY and spot marking performed by Min Smith MD at [address] [place] [address] 02/12/2021    ENDOSCOPIC ULTRASOUND, EGD performed by Antonio Bolton MD at [place] [address]  02/12/2021    EGD DIAGNOSTIC ONLY performed by Antonio Bolton MD at [place] [address] N/A 08/31/2021    EGD BIOPSY performed by Min Smith MD at [address] [place] [address] 01/21/2022    EGD BIOPSY performed by Min Smith MD at [address] [place] [address] N/A 04/15/2022    EGD ESOPHAGOGASTRODUODENOSCOPY performed by Andres Villeda MD at [address] [place] [address] 06/06/2022    EGD BAND LIGATION performed by Mildred Clay MD at [address] [place] [address] N/A 06/09/2022    EGD ESOPHAGOGASTRODUODENOSCOPY performed by Mildred Clay MD at [address] [place] [address]       Previous Medications    ATORVASTATIN (LIPITOR) 20 MG TABLET    Take 1 tablet by mouth nightly    FERROUS SULFATE (IRON 325) 325 (65 FE) MG TABLET    Take 1 tablet by mouth 2 times daily    FLUOXETINE (PROZAC) 20 MG CAPSULE    Take 1 capsule by mouth daily    FUROSEMIDE (LASIX) 20 MG TABLET    Take 1 tablet by mouth 2 times daily    LACTULOSE (CHRONULAC) 10 GM/15ML SOLUTION    take 30 milliliters by mouth three times a day    MIDODRINE (PROAMATINE) 5 MG TABLET    Take 2 tablets by mouth in the morning and 2 tablets at noon and 2 tablets before bedtime.     NADOLOL (CORGARD) 20 MG TABLET    take 1 tablet by mouth once daily    ONDANSETRON (ZOFRAN-ODT) 4 MG DISINTEGRATING TABLET    dissolve 1 tablet by mouth every 8 hours if needed for nausea and vomiting    PANTOPRAZOLE (PROTONIX) 40 MG TABLET    Take 1 tablet by mouth 2 times daily for 14 days    SPIRONOLACTONE (ALDACTONE) 100 MG TABLET    Take 1 tablet by mouth every evening    SUCRALFATE (CARAFATE) 1 GM TABLET    Take 1 tablet by mouth 4 times daily for 14 days    VITAMIN D (CHOLECALCIFEROL) 25 MCG (1000 UT) TABS TABLET    Take 1,000 Units by mouth daily     ALLERGIES     has No Known Allergies. FAMILY HISTORY     He indicated that his mother is . He indicated that his father is . He indicated that both of his sisters are alive. He indicated that his brother is alive. SOCIAL HISTORY       Social History     Tobacco Use    Smoking status: Former     Packs/day: 1.00     Years: 45.00     Pack years: 45.00     Types: Cigarettes     Quit date: 2017     Years since quittin.6    Smokeless tobacco: Never   Vaping Use    Vaping Use: Never used   Substance Use Topics    Alcohol use: Not Currently     Comment: Quit alcohol in 2019- heavier drinking prior to quitting    Drug use: Not Currently     Frequency: 1.0 times per week     Types: Cocaine     Comment: Cocaine- stopped spring 2016     PHYSICAL EXAM     INITIAL VITALS: /69   Pulse 83   Temp 97.9 °F (36.6 °C) (Oral)   Resp 27   Ht 5' 10\" (1.778 m)   Wt 172 lb (78 kg)   SpO2 100%   BMI 24.68 kg/m²    Physical Exam  Vitals and nursing note reviewed. Constitutional:       Appearance: Normal appearance. HENT:      Head: Normocephalic and atraumatic. Nose: Nose normal.      Mouth/Throat:      Mouth: Mucous membranes are moist.   Eyes:      Conjunctiva/sclera: Conjunctivae normal.      Pupils: Pupils are equal, round, and reactive to light. Cardiovascular:      Rate and Rhythm: Normal rate and regular rhythm. Pulses: Normal pulses. Heart sounds: Normal heart sounds.    Pulmonary:      Effort: Pulmonary effort is normal.      Breath sounds: Normal breath sounds. Abdominal:      Palpations: Abdomen is soft. Tenderness: There is no abdominal tenderness. Genitourinary:     Comments: Chaperoned by female nurse fab, black stool  Musculoskeletal:         General: No swelling or deformity. Cervical back: Normal range of motion. Skin:     General: Skin is warm. Findings: No rash. Neurological:      General: No focal deficit present. Mental Status: He is alert and oriented to person, place, and time.    Psychiatric:         Mood and Affect: Mood normal.       MEDICAL DECISION MAKINyear-old with anemia and dark black stools    Differential lower GI bleed, brisk upper GI bleed, ACS, CHF, pneumonia      ED Course as of 22 0006   e Sep 13, 2022   0004 CBC with Auto Differential(!!):    WBC 6.2   RBC 2.26(!)   Hemoglobin Quant 5.6(!!)   Hematocrit 18.6(!)   MCV 82.2   MCH 24.8(!)   MCHC 30.1(!)   RDW 19.6(!)   Platelet Count 332   MPV 6.8   Seg Neutrophils 85(!)   Lymphocytes 2(!)   Monocytes 13(!)   Eosinophils % 0   Basophils 0   Segs Absolute 5.27   Absolute Lymph # 0.12(!)   Absolute Mono # 0.81   Absolute Eos # 0.00   Basophils Absolute 0.00   Morphology ANISOCYTOSIS PRESENT   Morphology HYPOCHROMIA PRESENT   Morphology 1+ TEARDROPS  Anemia will transfuse [REY]   0004 CMP(!):    Glucose, Random 112(!)   BUN,BUNPL 13   Creatinine 0.70   CALCIUM, SERUM, [id number] 8.2(!)   Sodium 123(!)   Potassium 4.2   Chloride 94(!)   CO2 19(!)   Anion Gap 10   Alk Phos 119   ALT 13   AST 27   Bilirubin 1.6(!)   Total Protein 4.9(!)   Albumin 3.0(!)   GFR Non- >60   GFR  >60   GFR Comment       Nromal [REY]   0004 Protime-INR(!):    Prothrombin Time 17.7(!)   INR 1.5  Unremarkable [REY]   0004 Ammonia:    AMMONIA, PLASMA, [id number]  Nromal [REY]   0004 Troponin:    Troponin, High Sensitivity 22  Normal [REY]   0004 Microscopic Urinalysis:    WBC, UA 0 TO 2   RBC, UA 0 TO 2 Casts UA 10 TO 20   Casts UA HYALINE   Epithelial Cells, UA 10 TO 20   Bacteria, UA None  Normal [REY]   0004 Occult Blood Screen(!):    Occult Blood, Stool #1 POSITIVE(!)   Date, Stool #1 [phone]   Time, Stool #1 Unknown  Positive [REY]   0004 COVID-19 & Influenza Combo:    Specimen Description . NASOPHARYNGEAL SWAB   SOURCE, [id number] . NASOPHARYNGEAL SWAB   SARS-CoV-2 RNA, RT PCR Not Detected   INFLUENZA A Not Detected   INFLUENZA B Not Detected  Negative [REY]   0005 Discussed with NP accepts admission [REY]      ED Course User Index  [REY] Brian Calderon MD       CRITICAL CARE:     Due to the immediate potential for life-threatening deterioration due to severe anemia , I spent 41 minutes providing critical care. This time is excluding time spent performing procedures. PROCEDURES:    EKG 12 Lead    Date/Time: 9/12/2022 10:21 PM  Performed by: Brian Calderon MD  Authorized by: Brian Calderon MD     ECG reviewed by ED Physician in the absence of a cardiologist: yes    Interpretation:     Interpretation: normal    Rate:     ECG rate:  89    ECG rate assessment: normal    Rhythm:     Rhythm: sinus rhythm    Ectopy:     Ectopy: none    QRS:     QRS axis:  Normal    QRS intervals:  Normal    QRS conduction: normal    ST segments:     ST segments:  Normal  T waves:     T waves: normal    Q waves:     Abnormal Q-waves: not present      DIAGNOSTIC RESULTS   EKG:All EKG's are interpreted by the Emergency Department Physician who either signs or Co-signs this chart in the absence of a cardiologist.        RADIOLOGY:All plain film, CT, MRI, and formal ultrasound images (except ED bedside ultrasound) are read by the radiologist, see reports below, unless otherwisenoted in MDM or here. XR CHEST PORTABLE   Final Result   Unremarkable portable chest radiograph. LABS: All lab results were reviewed by myself, and all abnormals are listed below.   Labs Reviewed   CBC WITH AUTO DIFFERENTIAL - Abnormal; Notable for the following components:       Result Value    RBC 2.26 (*)     Hemoglobin 5.6 (*)     Hematocrit 18.6 (*)     MCH 24.8 (*)     MCHC 30.1 (*)     RDW 19.6 (*)     Seg Neutrophils 85 (*)     Lymphocytes 2 (*)     Monocytes 13 (*)     Absolute Lymph # 0.12 (*)     All other components within normal limits   COMPREHENSIVE METABOLIC PANEL - Abnormal; Notable for the following components:    Glucose 112 (*)     Calcium 8.2 (*)     Sodium 123 (*)     Chloride 94 (*)     CO2 19 (*)     Total Bilirubin 1.6 (*)     Total Protein 4.9 (*)     Albumin 3.0 (*)     All other components within normal limits   MAGNESIUM - Abnormal; Notable for the following components:    Magnesium 1.5 (*)     All other components within normal limits   PROTIME-INR - Abnormal; Notable for the following components:    Protime 17.7 (*)     All other components within normal limits   URINALYSIS WITH REFLEX TO CULTURE - Abnormal; Notable for the following components:    Color, UA Orange (*)     Bilirubin Urine SMALL (*)     Ketones, Urine TRACE (*)     Leukocyte Esterase, Urine TRACE (*)     All other components within normal limits   BRAIN NATRIURETIC PEPTIDE - Abnormal; Notable for the following components:    Pro- (*)     All other components within normal limits   OCCULT BLOOD SCREEN - Abnormal; Notable for the following components:    Occult Blood, Stool #1 POSITIVE (*)     All other components within normal limits   COVID-19 & INFLUENZA COMBO   LIPASE   TSH   T4, FREE   TROPONIN   AMMONIA   ETHANOL   MICROSCOPIC URINALYSIS   PERIPHERAL BLOOD SMEAR, PATH REVIEW   TYPE AND SCREEN   TYPE AND SCREEN   PREPARE RBC (CROSSMATCH)       EMERGENCY DEPARTMENTCOURSE:     Patient seen and evaluated for anemia as an outpatient    Symptomatic with dizziness and shortness of breath with exertion    Hemoccult positive stool known cirrhotic with varices    Never vomiting any blood no hematemesis    Hemodynamically stable    Given 2 units packed red blood cells and admitted to progressive unit      Vitals:    Vitals:    09/12/22 2140 09/12/22 2215 09/12/22 2301 09/12/22 2315   BP: 92/66 121/67 (!) 117/55 110/69   Pulse: 98 91 86 83   Resp: 20 18 23 27   Temp: 97.9 °F (36.6 °C)      TempSrc: Oral      SpO2: 99%  97% 100%   Weight: 172 lb (78 kg)      Height: 5' 10\" (1.778 m)          The patient was given the following medications while in the emergency department:  Orders Placed This Encounter   Medications    pantoprazole (PROTONIX) 80 mg in sodium chloride 0.9 % 50 mL bolus    cefTRIAXone (ROCEPHIN) 1,000 mg in sodium chloride 0.9 % 50 mL IVPB mini-bag     Order Specific Question:   Antimicrobial Indications     Answer:   Urinary Tract Infection    DISCONTD: octreotide acetate (SandoSTATIN) injection 50 mcg    octreotide (SANDOSTATIN) 500 mcg in sodium chloride 0.9 % 100 mL infusion    octreotide (SANDOSTATIN) injection 50 mcg    0.9 % sodium chloride infusion     CONSULTS:  IP CONSULT TO INTERNAL MEDICINE    FINAL IMPRESSION      1. Gastrointestinal hemorrhage, unspecified gastrointestinal hemorrhage type    2. Anemia, unspecified type          DISPOSITION/PLAN   DISPOSITION Decision To Admit 09/13/2022 12:02:25 AM      PATIENT REFERRED TO:  No follow-up provider specified. DISCHARGE MEDICATIONS:  New Prescriptions    No medications on file     The care is provided during an unprecedented national emergency due to the novel coronavirus, COVID 19.   MD Jacobo Griggs MD  09/13/22 0303

## 2022-09-13 NOTE — H&P
[address] [facility] Internal Medicine  Fernanda Hall MD; Jose C Kaiser MD; Shelia Sky MD; MD Smita Gordon MD; MD CHANDNI Huntley [facility]   [address]     HISTORY AND PHYSICAL EXAMINATION            Date:   9/13/2022  Patientname:  Kwasi Salgado  Date of admission:  9/12/2022  9:36 PM  MRN:   [id number]  Account:  [de-identified]  YOB: 1960  PCP:    VASU Jones  Room:   2116/2116-01  Code Status:    Full Code      Chief Complaint:     Chief Complaint   Patient presents with    Shortness of Breath    Dizziness       History Obtained From:     patient, electronic medical record, ED physician    History of Present Illness:     Kwasi Salgado is a 60 y.o. Non- / non  male who presents with Shortness of Breath and Dizziness with a history of alcoholic cirrhosis and hepatitis B and is admitted to the hospital for the management of GI bleed. According to patient, he has been having lightheadedness and shortness of breath with exertion over the past week. Additionally, reports having dark stools over the past week, sating that he has several episodes of Diarrhea, which is currently resolved. Symptoms are associated with lightheadedness low blood count. Patient reports having routine blood work earlier today with hemoglobin 6.6. Reports that he received a telephone call from the gastroenterology office instructing him to come to the ED tonight for transfusion. Denies fever, chills, chest pain, cough, abdominal pain, nausea, vomiting, and urinary symptoms. Symptoms are aggravated with activity; there are no alleviating factors. Symptoms are reported as intermittent and moderate.       Past Medical History:     Past Medical History:   Diagnosis Date    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 ([address]     Alcoholic cirrhosis of liver with ascites (HCC)     Anemia 04/13/2022    Anxiety     Arthritis     Back pain, chronic dr. Alicea Bowels, orthopedic, every 3-4 months, gets steroid injection    Lezama esophagus     BPH (benign prostatic hypertrophy)     Cholelithiasis     Cirrhosis ([address]     COVID-19 12/2020    pt reports he had a positive test while at [facility] in 2020, was asymptomatic    COVID-19 vaccine series completed 5/20/2021, 6/22/2021    Moderna 5/20/2021, 6/22/2021    DDD (degenerative disc disease), lumbar     Depression     Esophageal cancer ([address]     INVASIVE ADENOCARCINOMA ARISING IN TUBULAR ADENOMA WITH HIGH GRADE DYSPLASIA, ASSOCIATED WITH FOCAL INTESTINAL METAPLASIA     Esophageal varices ([address]     Fatty liver     GERD (gastroesophageal reflux disease)     Hep C w/o coma, chronic ([address]     History of alcohol abuse     6-12 beers a day; quit drinking 2019    History of blood transfusion     History of colon polyps 2016    History of tobacco abuse     [place] (hard of hearing)     Hyperlipidemia     Hypertension     Hyponatremia 07/20/2016    Port-A-Cath in place     right upper chest    Portal hypertension ([address]     Sciatica     Secondary esophageal varices ([address] 06/07/2022    Shortness of breath     Spinal stenosis     Stomach ulcer     hx of    Tubular adenoma of colon 2016, 2018    Vitamin D deficiency     Wears glasses         Past Surgical History:     Past Surgical History:   Procedure Laterality Date    BUNIONECTOMY      twice on right side    BUNIONECTOMY Left     CARPAL TUNNEL RELEASE Right     COLONOSCOPY      at age 35    COLONOSCOPY  10/05/2016    polyps-pathology tubular adenoma, and abnormal looking mucosa right colon-pathology-tubular adenoma    COLONOSCOPY N/A 03/30/2018    COLONOSCOPY POLYPECTOMY COLD BIOPSY performed by Min Smith MD at [address],[facility] [address]  03/30/2018    Small polyp in the sigmoid colon and excised with biopsy forceps--tubular adenoma    COLONOSCOPY N/A 04/16/2022    COLONOSCOPY POLYPECTOMY performed by Min Smith MD at [place] [address], [place], DIAGNOSTIC EGD    IR PORT PLACEMENT EQUAL OR GREATER THAN 5 YEARS  04/19/2021    IR PORT PLACEMENT EQUAL OR GREATER THAN 5 YEARS 4/19/2021 [facility] SPECIAL PROCEDURES    KNEE SURGERY Left     cyst removed    NASAL SEPTUM SURGERY      NERVE BLOCK Right 11/23/2020    NERVE BLOCK RIGHT CERVICAL STEROID INJECTION  C3-C6 performed by Guanaco Chambers MD at [address]  01/04/2016    steroid injection C7 T1    OTHER SURGICAL HISTORY  11/21/2016    Bilateral Lumbar CACHORRO L4-L5 injections    OTHER SURGICAL HISTORY  12/19/2016    lumbar steroid injection    OTHER SURGICAL HISTORY  09/28/2018    BILATERAL L5 CACHORRO (N/A Back)    OTHER SURGICAL HISTORY Right 11/23/2020    cervical injection    PAIN MANAGEMENT PROCEDURE Left 07/09/2020    EPIDURAL STEROID INJECTION LEFT L4 L5 performed by Guanaco Chambers MD at [address] Left 07/20/2020    LEFT L4 L5 EPIDURAL STEROID INJECTION performed by Guanaco Chambers MD at [address] Bilateral 08/17/2020    LUMBAR FACET BILATERAL L2-L5 performed by Guanaco Chambers MD at [address] Bilateral 12/07/2020    NERVE BLOCK BILATERAL LUMBAR MEDIAL BRANCH L2-L5 performed by Guanaco Chambers MD at [address] [place], [place] [address]o [address] AA&/STRD TFRML EPI LUMBAR/SACRAL 1 LEVEL Bilateral 09/06/2018    BILATERAL L5 CACHORRO performed by Guanaco Chambers MD at [facility] AA&/STRD TFRML EPI LUMBAR/SACRAL 1 LEVEL N/A 09/28/2018    BILATERAL L5 CACHORRO performed by Guanaco Chambers MD at [address] [place] [address] N/CARPAL TUNNEL SURG Right 08/29/2017    CARPAL TUNNEL RELEASE RIGHT performed by Mary Joseph MD at [address] [place] [address] N/CARPAL TUNNEL SURG Left 10/31/2017    CARPAL TUNNEL RELEASE performed by Mary Joseph MD at [address] [place] [address] 12/29/2020    EGD BIOPSY performed by Diana Mccurdy MD at [address] [place] [address] 02/02/2021    EGD BIOPSY and spot marking performed by Dung Jade MD at [address] [place] [address] 02/12/2021    ENDOSCOPIC ULTRASOUND, EGD performed by Pam Macias MD at [place] [address]  02/12/2021    EGD DIAGNOSTIC ONLY performed by Pam Macias MD at [facility] 110 08/31/2021    EGD BIOPSY performed by Dung Jade MD at [address] [place] [address] 01/21/2022    EGD BIOPSY performed by Dung Jade MD at [address] [place] [address] N/A 04/15/2022    EGD ESOPHAGOGASTRODUODENOSCOPY performed by Nancy Bynum MD at [address] [place] [address] 06/06/2022    EGD BAND LIGATION performed by Loco Hope MD at [address] [place] [address] N/A 06/09/2022    EGD ESOPHAGOGASTRODUODENOSCOPY performed by Loco Hope MD at [address] [place] [address]          Medications Prior to Admission:     Prior to Admission medications    Medication Sig Start Date End Date Taking? Authorizing Provider   nadolol (CORGARD) 20 MG tablet take 1 tablet by mouth once daily 8/26/22  Yes Saintclair Civil, APRN - NP   midodrine (PROAMATINE) 5 MG tablet Take 2 tablets by mouth in the morning and 2 tablets at noon and 2 tablets before bedtime.  8/4/22  Yes Saintclair Civil, APRN - NP   furosemide (LASIX) 20 MG tablet Take 1 tablet by mouth 2 times daily 6/23/22  Yes Saintclair Civil, APRN - NP   lactulose [facility]) 10 GM/15ML solution take 30 milliliters by mouth three times a day 5/31/22  Yes VASU Jones   FLUoxetine (PROZAC) 20 MG capsule Take 1 capsule by mouth daily 4/21/22  Yes Donna Guevara MD   atorvastatin (LIPITOR) 20 MG tablet Take 1 tablet by mouth nightly 4/21/22  Yes Donna Guevara MD   ferrous sulfate (IRON 325) 325 (65 Fe) MG tablet Take 1 tablet by mouth 2 times daily 1/12/22  Yes VASU Jones   spironolactone (ALDACTONE) 100 BMI 26.03 kg/m²  Body mass index is 26.03 kg/m². Physical Exam  Constitutional:       General: He is not in acute distress. Appearance: He is well-developed. He is not diaphoretic. HENT:      Head: Normocephalic and atraumatic. Eyes:      Pupils: Pupils are equal, round, and reactive to light. Comments: Conjunctiva pale   Neck:      Trachea: No tracheal deviation. Cardiovascular:      Rate and Rhythm: Normal rate and regular rhythm. Heart sounds: Normal heart sounds. No murmur heard. No friction rub. No gallop. Pulmonary:      Effort: Pulmonary effort is normal. No respiratory distress. Breath sounds: Normal breath sounds. No wheezing or rales. Chest:      Chest wall: No tenderness. Abdominal:      General: Bowel sounds are normal. There is no distension. Palpations: Abdomen is soft. Tenderness: There is no abdominal tenderness. There is no guarding. Musculoskeletal:         General: No tenderness. Normal range of motion. Cervical back: Normal range of motion and neck supple. Lymphadenopathy:      Cervical: No cervical adenopathy. Skin:     General: Skin is warm and dry. Coloration: Skin is pale. Findings: No erythema or rash. Neurological:      Mental Status: He is alert and oriented to person, place, and time. Motor: No seizure activity. Coordination: Coordination normal.   Psychiatric:         Behavior: Behavior normal.         Thought Content:  Thought content normal.       Investigations:      Laboratory Testing:  Recent Results (from the past 24 hour(s))   CBC with Auto Differential    Collection Time: 09/12/22  2:57 PM   Result Value Ref Range    WBC 6.4 3.5 - 11.0 k/uL    RBC 2.65 (L) 4.5 - 5.9 m/uL    Hemoglobin 6.6 (LL) 13.5 - 17.5 g/dL    Hematocrit 22.0 (L) 41 - 53 %    MCV 82.8 80 - 100 fL    MCH 24.9 (L) 26 - 34 pg    MCHC 30.1 (L) 31 - 37 g/dL    RDW 19.2 (H) 11.5 - 14.9 %    Platelets 055 646 - 168 k/uL    MPV 6.1 6.0 - 12.0 fL    Seg Neutrophils 78 (H) 36 - 66 %    Lymphocytes 14 (L) 24 - 44 %    Monocytes 5 1 - 7 %    Eosinophils % 2 0 - 4 %    Basophils 1 0 - 2 %    Segs Absolute 4.99 1.3 - 9.1 k/uL    Absolute Lymph # 0.90 (L) 1.0 - 4.8 k/uL    Absolute Mono # 0.32 0.1 - 1.3 k/uL    Absolute Eos # 0.13 0.0 - 0.4 k/uL    Basophils Absolute 0.06 0.0 - 0.2 k/uL    Morphology ANISOCYTOSIS PRESENT     Morphology HYPOCHROMIA PRESENT     Morphology 1+ TARGET CELLS    Comprehensive Metabolic Panel    Collection Time: 09/12/22  2:57 PM   Result Value Ref Range    Glucose 91 70 - 99 mg/dL    BUN 11 8 - 23 mg/dL    Creatinine 0.68 (L) 0.70 - 1.20 mg/dL    Calcium 8.7 8.6 - 10.4 mg/dL    Sodium 125 (L) 135 - 144 mmol/L    Potassium 4.4 3.7 - 5.3 mmol/L    Chloride 94 (L) 98 - 107 mmol/L    CO2 19 (L) 20 - 31 mmol/L    Anion Gap 12 9 - 17 mmol/L    Alkaline Phosphatase 134 (H) 40 - 129 U/L    ALT 16 5 - 41 U/L    AST 32 <40 U/L    Total Bilirubin 1.8 (H) 0.3 - 1.2 mg/dL    Total Protein 5.4 (L) 6.4 - 8.3 g/dL    Albumin 3.2 (L) 3.5 - 5.2 g/dL    GFR Non-African American >60 >60 mL/min    GFR African American >60 >60 mL/min    GFR Comment         EKG 12 Lead    Collection Time: 09/12/22 10:18 PM   Result Value Ref Range    Ventricular Rate 89 BPM    Atrial Rate 89 BPM    P-R Interval 152 ms    QRS Duration 68 ms    Q-T Interval 364 ms    QTc Calculation (Bazett) 442 ms    P Axis 30 degrees    R Axis 45 degrees    T Axis 6 degrees   TYPE AND SCREEN    Collection Time: 09/12/22 10:30 PM   Result Value Ref Range    Expiration Date 09/15/2022,2359     Arm Band Number [id number]     ABO/Rh AB POSITIVE     Antibody Screen NEGATIVE     A1 Lectin NEGATIVE     Unit Number [id number]     Product Code Leukocyte Reduced Red Cell     Unit Divison 00     Dispense Status ISSUED     Unit Issue Date/Time 359172780977     Product Code Blood Bank [id number]     Blood Bank Unit Type and Rh O POS     Blood Bank ISBT Product Blood Type 5100     Blood Bank Blood Product Expiration Date 880896490197     Transfusion Status OK TO TRANSFUSE     Crossmatch Result COMPATIBLE     Unit Number [id number]     Product Code Leukocyte Reduced Red Cell     Unit Divison 00     Dispense Status ALLOCATED     Transfusion Status OK TO TRANSFUSE     Crossmatch Result COMPATIBLE    COVID-19 & Influenza Combo    Collection Time: 09/12/22 10:32 PM    Specimen: Nasopharyngeal Swab   Result Value Ref Range    Specimen Description . NASOPHARYNGEAL SWAB     Source . NASOPHARYNGEAL SWAB     SARS-CoV-2 RNA, RT PCR Not Detected Not Detected    INFLUENZA A Not Detected Not Detected    INFLUENZA B Not Detected Not Detected   Occult Blood Screen    Collection Time: 09/12/22 10:33 PM   Result Value Ref Range    Occult Blood, Stool #1 POSITIVE (A) NEGATIVE    Date, Stool #1 1,567,[phone]     Time, Stool #1 Unknown    CBC with Auto Differential    Collection Time: 09/12/22 11:01 PM   Result Value Ref Range    WBC 6.2 3.5 - 11.0 k/uL    RBC 2.26 (L) 4.5 - 5.9 m/uL    Hemoglobin 5.6 (LL) 13.5 - 17.5 g/dL    Hematocrit 18.6 (L) 41 - 53 %    MCV 82.2 80 - 100 fL    MCH 24.8 (L) 26 - 34 pg    MCHC 30.1 (L) 31 - 37 g/dL    RDW 19.6 (H) 11.5 - 14.9 %    Platelets 027 273 - 491 k/uL    MPV 6.8 6.0 - 12.0 fL    Seg Neutrophils 85 (H) 36 - 66 %    Lymphocytes 2 (L) 24 - 44 %    Monocytes 13 (H) 1 - 7 %    Eosinophils % 0 0 - 4 %    Basophils 0 0 - 2 %    Segs Absolute 5.27 1.3 - 9.1 k/uL    Absolute Lymph # 0.12 (L) 1.0 - 4.8 k/uL    Absolute Mono # 0.81 0.1 - 1.3 k/uL    Absolute Eos # 0.00 0.0 - 0.4 k/uL    Basophils Absolute 0.00 0.0 - 0.2 k/uL    Morphology ANISOCYTOSIS PRESENT     Morphology HYPOCHROMIA PRESENT     Morphology 1+ TEARDROPS    CMP    Collection Time: 09/12/22 11:01 PM   Result Value Ref Range    Glucose 112 (H) 70 - 99 mg/dL    BUN 13 8 - 23 mg/dL    Creatinine 0.70 0.70 - 1.20 mg/dL    Calcium 8.2 (L) 8.6 - 10.4 mg/dL    Sodium 123 (L) 135 - 144 mmol/L    Potassium 4.2 3.7 - 5.3 mmol/L    Chloride 94 (L) 98 - 107 mmol/L    CO2 19 (L) 20 - 31 mmol/L    Anion Gap 10 9 - 17 mmol/L    Alkaline Phosphatase 119 40 - 129 U/L    ALT 13 5 - 41 U/L    AST 27 <40 U/L    Total Bilirubin 1.6 (H) 0.3 - 1.2 mg/dL    Total Protein 4.9 (L) 6.4 - 8.3 g/dL    Albumin 3.0 (L) 3.5 - 5.2 g/dL    GFR Non-African American >60 >60 mL/min    GFR African American >60 >60 mL/min    GFR Comment         Lipase    Collection Time: 09/12/22 11:01 PM   Result Value Ref Range    Lipase 23 13 - 60 U/L   Magnesium    Collection Time: 09/12/22 11:01 PM   Result Value Ref Range    Magnesium 1.5 (L) 1.6 - 2.6 mg/dL   Protime-INR    Collection Time: 09/12/22 11:01 PM   Result Value Ref Range    Protime 17.7 (H) 11.8 - 14.6 sec    INR 1.5    TSH    Collection Time: 09/12/22 11:01 PM   Result Value Ref Range    TSH 3.05 0.30 - 5.00 uIU/mL   T4, Free    Collection Time: 09/12/22 11:01 PM   Result Value Ref Range    Thyroxine, Free 1.31 0.93 - 1.70 ng/dL   Troponin    Collection Time: 09/12/22 11:01 PM   Result Value Ref Range    Troponin, High Sensitivity 22 0 - 22 ng/L   Ammonia    Collection Time: 09/12/22 11:01 PM   Result Value Ref Range    Ammonia 44 16 - 60 umol/L   Brain Natriuretic Peptide    Collection Time: 09/12/22 11:01 PM   Result Value Ref Range    Pro- (H) <300 pg/mL   ETOH    Collection Time: 09/12/22 11:01 PM   Result Value Ref Range    Ethanol <10 <10 mg/dL    Ethanol percent <0.010 %   Urinalysis with Reflex to Culture    Collection Time: 09/12/22 11:03 PM    Specimen: Urine, clean catch   Result Value Ref Range    Color, UA Orange (A) Yellow    Turbidity UA Clear Clear    Glucose, Ur NEGATIVE NEGATIVE    Bilirubin Urine SMALL (A) NEGATIVE    Ketones, Urine TRACE (A) NEGATIVE    Specific Howard, UA 1.016 1.000 - 1.030    Urine Hgb NEGATIVE NEGATIVE    pH, UA 5.5 5.0 - 8.0    Protein, UA NEGATIVE NEGATIVE    Urobilinogen, Urine Normal Normal    Nitrite, Urine NEGATIVE NEGATIVE    Leukocyte Esterase, Urine TRACE (A) NEGATIVE Microscopic Urinalysis    Collection Time: 09/12/22 11:03 PM   Result Value Ref Range    WBC, UA 0 TO 2 /HPF    RBC, UA 0 TO 2 /HPF    Casts UA 10 TO 20 /LPF    Casts UA HYALINE /LPF    Epithelial Cells UA 10 TO 20 /HPF    Bacteria, UA None None       Imaging/Diagnostics:  XR CHEST PORTABLE    Result Date: 9/12/2022  Unremarkable portable chest radiograph. IR US GUIDED PARACENTESIS    Result Date: 9/9/2022  Successful ultrasound-guided paracentesis. Assessment :      Hospital Problems             Last Modified POA    * (Principal) GI bleed 9/13/2022 Yes    Secondary esophageal varices ([facility] 9/13/2022 Yes    Chronic viral hepatitis B without delta agent and without coma ([facility] 9/13/2022 Yes    Malignant neoplasm of lower third of esophagus ([facility] 9/13/2022 Yes       Plan:     Patient status inpatient in the Progressive Unit/Step down    Rectal Bleeding  -patient reports dark melanic stools for the past week  -rectal guaiac positive in ED  -hemoglobin 6.6 earlier today in outpatient setting  --Hemoglobin 5.6 upon arrival to ED  -Change protonix to IV  -Octreotide administered in ED   --history of severe portal hypertensive gastropathy  -Hold ASA and VTE d/t bleeding  -Consult GI  -NPO until seen  -Nausea control     Anemia  -Hemoglobin 5.6  --2 unit PRBC ordered in ED  -hemoccult positive on REBECCA  -patient with known history of anemia  --Takes iron supplements bid  -iron and TIBC pending  --peripheral smear pending  -Consult GI to see in am  -NPO after midnight     Urinary tract infection  -UA shows trace leukocyte estrace  -Rocephin initiated in ED  -Urine culture pending     History of Alcoholic cirrhosis  -Reports past history of ETOH abuse  -also history of Hepatitis B and C  -Hold Lasix and [place] for now  --Hypotensive and on IV fluids  -Ammonia level - 44      2.  Disposition 3-4      Consultations:   IP CONSULT TO INTERNAL MEDICINE  IP CONSULT TO GI    Patient is admitted as inpatient status because of co-morbidities listed above, severity of signs and symptoms as outlined, requirement for current medical therapies and most importantly because of direct risk to patient if care not provided in a hospital setting. Expected length of stay > 48 hours. Helena Huerta APREROS Pena CNP  9/13/2022  5:58 AM    Copy sent to VASU Braden    Please note that this chart was generated using voice recognition Dragon dictation software. Although every effort was made to ensure the accuracy of this automated transcription, some errors in transcription may have occurred. Sima Hay [address] [place] [address], [address] [place] [address]. Phone [phone]      Attending Physician Statement  I have discussed the care of Ba Hansen and I have examined the patient myselft and taken ros and hpi , including pertinent history and exam findings,  with the resident. I have reviewed the key elements of all parts of the encounter with the resident. I agree with the assessment, plan and orders as documented by the resident. Spent 55 minutes in reviewing data/medicines/talking to patient/family,  explaining and answering all the questions.     Active GI bleed, melanotic stools, severe anemia due to blood loss, s/p transfusion, now looks okay,  Hyponatremia   Severe alcoholism with advanced cirrhosis and possible esophageal varices,  Patient will have the EGD done tomorrow,  Urine tract infection, started on Rocephin,  Also has history of hepatitis C, continue home medications, ammonia level 44, will hold Lasix and spironolactone as blood pressure was running low    Electronically signed by April Plummer MD

## 2022-09-13 NOTE — CARE COORDINATION
CASE MANAGEMENT NOTE:    Admission Date:  9/12/2022 Jojo Burr is a 60 y.o.  male    Admitted for : GI bleed [K92.2]  Gastrointestinal hemorrhage, unspecified gastrointestinal hemorrhage type [K92.2]  Anemia, unspecified type [D64.9]    Met with:  Patient    PCP:  Dr. Thais Estrada:  [place]       Is patient alert and oriented at time of discussion:  Yes    Current Residence/ Living Arrangements:  independently at home             Current Services PTA:  No    Does patient go to outpatient dialysis: No  If yes, location and chair time:   Who is their nephrologist?     Is patient agreeable to VNS: Yes    Freedom of choice provided:  Yes    List of [address] provided: Yes    VNS chosen:  Yes    DME:  straight cane, walker, and other grab bars    Home Oxygen: No    Nebulizer: No    CPAP/BIPAP: No    Supplier: N/A    Potential Assistance Needed: No    SNF needed: No    Freedom of choice and list provided: No    Pharmacy:  [facility]       Is patient currently receiving oral anticoagulation therapy? No    Is the Patient an NIHARIKA SHAH [facility] with Readmission Risk Score greater than 14%? No  If yes, pt needs a follow up appointment made within 7 days. Family Members/Caregivers that pt would like involved in their care:    Yes    If yes, list name here:  [facility]    Transportation Provider:  Family             Discharge Plan:  9/13/22 [employer] Pt is from home in a one story home alone DME walker, cane and grab bars VNS pt is agreeable has had [facility] in the past will send referral Plan is to discharge to home with vns LACY is started and needs signed will follow IMM done 9/13/22 @ 1107  .//tv                  Electronically signed by:  Rosana Mccoy RN on 9/13/2022 at 11:59 AM

## 2022-09-13 NOTE — PROGRESS NOTES
Physician Progress Note      Bandar Carrillo  CSN #:                  [id number]  :                       1960  ADMIT DATE:       2022 9:36 PM  [address] [place] DATE:  Monse Covington  PROVIDER #:        Dayana Sepulveda MD        QUERY TEXT:    Type of Anemia: Please provide further specificity, if known. Clinical indicators include: gi bleed, hemoglobin, transfusion, cancer, blood   transfusion, ferrous sulfate, iron, fe, blood in stool, melena, rbc,   hematocrit, platelets, transfuse, occult blood, hgb, bleeding, guaiac, anemia,   2 unit prbc, hemoccult, iron supplements, tibc, blood loss  Options provided:  -- Anemia due to acute blood loss  -- Anemia due to chronic blood loss  -- Anemia due to iron deficiency  -- Anemia due to postoperative blood loss  -- Anemia due to chronic disease  -- Other - I will add my own diagnosis  -- Disagree - Not applicable / Not valid  -- Disagree - Clinically Unable to determine / Unknown        PROVIDER RESPONSE TEXT:    The patient has anemia due to acute blood loss.       Electronically signed by:  Dayana Sepulveda MD 2022 5:43 PM

## 2022-09-14 ENCOUNTER — ANESTHESIA (OUTPATIENT)
Dept: ENDOSCOPY | Age: 62
DRG: 378 | End: 2022-09-14
Payer: MEDICARE

## 2022-09-14 ENCOUNTER — ANESTHESIA EVENT (OUTPATIENT)
Dept: ENDOSCOPY | Age: 62
DRG: 378 | End: 2022-09-14
Payer: MEDICARE

## 2022-09-14 LAB
ANION GAP SERPL CALCULATED.3IONS-SCNC: 6 MMOL/L (ref 9–17)
BUN BLDV-MCNC: 9 MG/DL (ref 8–23)
CALCIUM SERPL-MCNC: 7.8 MG/DL (ref 8.6–10.4)
CHLORIDE BLD-SCNC: 105 MMOL/L (ref 98–107)
CO2: 21 MMOL/L (ref 20–31)
CREAT SERPL-MCNC: 0.68 MG/DL (ref 0.7–1.2)
CULTURE: NORMAL
GFR AFRICAN AMERICAN: >60 ML/MIN
GFR NON-AFRICAN AMERICAN: >60 ML/MIN
GFR SERPL CREATININE-BSD FRML MDRD: ABNORMAL ML/MIN/{1.73_M2}
GLUCOSE BLD-MCNC: 127 MG/DL (ref 70–99)
HCT VFR BLD CALC: 24.2 % (ref 41–53)
HCT VFR BLD CALC: 25.8 % (ref 41–53)
HCT VFR BLD CALC: 25.8 % (ref 41–53)
HCT VFR BLD CALC: 27 % (ref 41–53)
HEMOGLOBIN: 7.4 G/DL (ref 13.5–17.5)
HEMOGLOBIN: 7.8 G/DL (ref 13.5–17.5)
HEMOGLOBIN: 7.8 G/DL (ref 13.5–17.5)
HEMOGLOBIN: 8.2 G/DL (ref 13.5–17.5)
INR BLD: 1.4
PARTIAL THROMBOPLASTIN TIME: 31.4 SEC (ref 24–36)
POTASSIUM SERPL-SCNC: 4.3 MMOL/L (ref 3.7–5.3)
PROTHROMBIN TIME: 16.7 SEC (ref 11.8–14.6)
SODIUM BLD-SCNC: 132 MMOL/L (ref 135–144)
SPECIMEN DESCRIPTION: NORMAL

## 2022-09-14 PROCEDURE — 2500000003 HC RX 250 WO HCPCS: Performed by: NURSE ANESTHETIST, CERTIFIED REGISTERED

## 2022-09-14 PROCEDURE — 80048 BASIC METABOLIC PNL TOTAL CA: CPT

## 2022-09-14 PROCEDURE — 85018 HEMOGLOBIN: CPT

## 2022-09-14 PROCEDURE — 6360000002 HC RX W HCPCS: Performed by: STUDENT IN AN ORGANIZED HEALTH CARE EDUCATION/TRAINING PROGRAM

## 2022-09-14 PROCEDURE — 2709999900 HC NON-CHARGEABLE SUPPLY: Performed by: INTERNAL MEDICINE

## 2022-09-14 PROCEDURE — 3609017100 HC EGD: Performed by: INTERNAL MEDICINE

## 2022-09-14 PROCEDURE — 2580000003 HC RX 258: Performed by: INTERNAL MEDICINE

## 2022-09-14 PROCEDURE — 85610 PROTHROMBIN TIME: CPT

## 2022-09-14 PROCEDURE — 3700000000 HC ANESTHESIA ATTENDED CARE: Performed by: INTERNAL MEDICINE

## 2022-09-14 PROCEDURE — 85014 HEMATOCRIT: CPT

## 2022-09-14 PROCEDURE — 2580000003 HC RX 258: Performed by: NURSE PRACTITIONER

## 2022-09-14 PROCEDURE — 36415 COLL VENOUS BLD VENIPUNCTURE: CPT

## 2022-09-14 PROCEDURE — 0W3P8ZZ CONTROL BLEEDING IN GASTROINTESTINAL TRACT, VIA NATURAL OR ARTIFICIAL OPENING ENDOSCOPIC: ICD-10-PCS | Performed by: INTERNAL MEDICINE

## 2022-09-14 PROCEDURE — A4216 STERILE WATER/SALINE, 10 ML: HCPCS | Performed by: NURSE PRACTITIONER

## 2022-09-14 PROCEDURE — 6360000002 HC RX W HCPCS: Performed by: INTERNAL MEDICINE

## 2022-09-14 PROCEDURE — 6360000002 HC RX W HCPCS: Performed by: ANESTHESIOLOGY

## 2022-09-14 PROCEDURE — 7100000000 HC PACU RECOVERY - FIRST 15 MIN: Performed by: INTERNAL MEDICINE

## 2022-09-14 PROCEDURE — C9113 INJ PANTOPRAZOLE SODIUM, VIA: HCPCS | Performed by: NURSE PRACTITIONER

## 2022-09-14 PROCEDURE — 6360000002 HC RX W HCPCS: Performed by: NURSE ANESTHETIST, CERTIFIED REGISTERED

## 2022-09-14 PROCEDURE — 7100000001 HC PACU RECOVERY - ADDTL 15 MIN: Performed by: INTERNAL MEDICINE

## 2022-09-14 PROCEDURE — 85730 THROMBOPLASTIN TIME PARTIAL: CPT

## 2022-09-14 PROCEDURE — 2580000003 HC RX 258: Performed by: STUDENT IN AN ORGANIZED HEALTH CARE EDUCATION/TRAINING PROGRAM

## 2022-09-14 PROCEDURE — 6360000002 HC RX W HCPCS: Performed by: NURSE PRACTITIONER

## 2022-09-14 PROCEDURE — 6370000000 HC RX 637 (ALT 250 FOR IP): Performed by: NURSE PRACTITIONER

## 2022-09-14 PROCEDURE — 99232 SBSQ HOSP IP/OBS MODERATE 35: CPT | Performed by: INTERNAL MEDICINE

## 2022-09-14 PROCEDURE — 2720000010 HC SURG SUPPLY STERILE: Performed by: INTERNAL MEDICINE

## 2022-09-14 PROCEDURE — 3700000001 HC ADD 15 MINUTES (ANESTHESIA): Performed by: INTERNAL MEDICINE

## 2022-09-14 PROCEDURE — 2060000000 HC ICU INTERMEDIATE R&B

## 2022-09-14 RX ORDER — LIDOCAINE HYDROCHLORIDE 10 MG/ML
INJECTION, SOLUTION INFILTRATION; PERINEURAL PRN
Status: DISCONTINUED | OUTPATIENT
Start: 2022-09-14 | End: 2022-09-14 | Stop reason: SDUPTHER

## 2022-09-14 RX ORDER — SODIUM CHLORIDE 9 MG/ML
INJECTION, SOLUTION INTRAVENOUS PRN
Status: DISCONTINUED | OUTPATIENT
Start: 2022-09-14 | End: 2022-09-14 | Stop reason: HOSPADM

## 2022-09-14 RX ORDER — FENTANYL CITRATE 50 UG/ML
50 INJECTION, SOLUTION INTRAMUSCULAR; INTRAVENOUS EVERY 5 MIN PRN
Status: DISCONTINUED | OUTPATIENT
Start: 2022-09-14 | End: 2022-09-14 | Stop reason: HOSPADM

## 2022-09-14 RX ORDER — KETAMINE HYDROCHLORIDE 50 MG/ML
INJECTION, SOLUTION, CONCENTRATE INTRAMUSCULAR; INTRAVENOUS PRN
Status: DISCONTINUED | OUTPATIENT
Start: 2022-09-14 | End: 2022-09-14 | Stop reason: SDUPTHER

## 2022-09-14 RX ORDER — ONDANSETRON 2 MG/ML
4 INJECTION INTRAMUSCULAR; INTRAVENOUS
Status: COMPLETED | OUTPATIENT
Start: 2022-09-14 | End: 2022-09-14

## 2022-09-14 RX ORDER — PROPOFOL 10 MG/ML
INJECTION, EMULSION INTRAVENOUS PRN
Status: DISCONTINUED | OUTPATIENT
Start: 2022-09-14 | End: 2022-09-14 | Stop reason: SDUPTHER

## 2022-09-14 RX ORDER — FENTANYL CITRATE 50 UG/ML
25 INJECTION, SOLUTION INTRAMUSCULAR; INTRAVENOUS EVERY 5 MIN PRN
Status: DISCONTINUED | OUTPATIENT
Start: 2022-09-14 | End: 2022-09-14 | Stop reason: HOSPADM

## 2022-09-14 RX ORDER — SODIUM CHLORIDE 0.9 % (FLUSH) 0.9 %
5-40 SYRINGE (ML) INJECTION EVERY 12 HOURS SCHEDULED
Status: DISCONTINUED | OUTPATIENT
Start: 2022-09-14 | End: 2022-09-14 | Stop reason: HOSPADM

## 2022-09-14 RX ORDER — SODIUM CHLORIDE 0.9 % (FLUSH) 0.9 %
5-40 SYRINGE (ML) INJECTION PRN
Status: DISCONTINUED | OUTPATIENT
Start: 2022-09-14 | End: 2022-09-14 | Stop reason: HOSPADM

## 2022-09-14 RX ORDER — DIPHENHYDRAMINE HYDROCHLORIDE 50 MG/ML
12.5 INJECTION INTRAMUSCULAR; INTRAVENOUS
Status: DISCONTINUED | OUTPATIENT
Start: 2022-09-14 | End: 2022-09-14 | Stop reason: HOSPADM

## 2022-09-14 RX ADMIN — SODIUM CHLORIDE: 9 INJECTION, SOLUTION INTRAVENOUS at 04:52

## 2022-09-14 RX ADMIN — CEFTRIAXONE SODIUM 1000 MG: 1 INJECTION, POWDER, FOR SOLUTION INTRAMUSCULAR; INTRAVENOUS at 22:57

## 2022-09-14 RX ADMIN — KETAMINE HYDROCHLORIDE 50 MG: 50 INJECTION, SOLUTION INTRAMUSCULAR; INTRAVENOUS at 16:07

## 2022-09-14 RX ADMIN — SODIUM CHLORIDE: 9 INJECTION, SOLUTION INTRAVENOUS at 14:35

## 2022-09-14 RX ADMIN — FLUOXETINE 20 MG: 20 CAPSULE ORAL at 08:38

## 2022-09-14 RX ADMIN — SODIUM CHLORIDE: 9 INJECTION, SOLUTION INTRAVENOUS at 22:02

## 2022-09-14 RX ADMIN — OCTREOTIDE ACETATE 50 MCG/HR: 500 INJECTION, SOLUTION INTRAVENOUS; SUBCUTANEOUS at 06:18

## 2022-09-14 RX ADMIN — MIDODRINE HYDROCHLORIDE 10 MG: 10 TABLET ORAL at 08:38

## 2022-09-14 RX ADMIN — PROPOFOL 50 MG: 10 INJECTION, EMULSION INTRAVENOUS at 16:14

## 2022-09-14 RX ADMIN — MIDODRINE HYDROCHLORIDE 10 MG: 10 TABLET ORAL at 13:11

## 2022-09-14 RX ADMIN — PROPOFOL 50 MG: 10 INJECTION, EMULSION INTRAVENOUS at 16:07

## 2022-09-14 RX ADMIN — SODIUM CHLORIDE 40 MG: 9 INJECTION INTRAMUSCULAR; INTRAVENOUS; SUBCUTANEOUS at 08:38

## 2022-09-14 RX ADMIN — PROPOFOL 50 MG: 10 INJECTION, EMULSION INTRAVENOUS at 16:19

## 2022-09-14 RX ADMIN — LIDOCAINE HYDROCHLORIDE 50 MG: 10 INJECTION, SOLUTION INFILTRATION; PERINEURAL at 16:07

## 2022-09-14 RX ADMIN — SODIUM CHLORIDE, PRESERVATIVE FREE 10 ML: 5 INJECTION INTRAVENOUS at 08:39

## 2022-09-14 RX ADMIN — OCTREOTIDE ACETATE 50 MCG/HR: 500 INJECTION, SOLUTION INTRAVENOUS; SUBCUTANEOUS at 18:35

## 2022-09-14 RX ADMIN — ONDANSETRON 4 MG: 2 INJECTION INTRAMUSCULAR; INTRAVENOUS at 17:10

## 2022-09-14 ASSESSMENT — LIFESTYLE VARIABLES: SMOKING_STATUS: 1

## 2022-09-14 ASSESSMENT — ENCOUNTER SYMPTOMS: SHORTNESS OF BREATH: 1

## 2022-09-14 ASSESSMENT — PAIN - FUNCTIONAL ASSESSMENT: PAIN_FUNCTIONAL_ASSESSMENT: 0-10

## 2022-09-14 NOTE — CARE COORDINATION
ONGOING DISCHARGE PLAN:    Patient is alert and oriented x4. Spoke with patient regarding discharge plan and patient confirms that plan is still to discharge to home with no needs    Patient denies need for vns    Patient had a EGD done today     Patient remains on IV atb     ON IV PPI    Will continue to follow for additional discharge needs.     Electronically signed by Socrates Ascencio RN on 9/14/2022 at 2:00 PM

## 2022-09-14 NOTE — PROGRESS NOTES
[address] Internal Medicine  Dorcas Ellsworth MD; Sally Perez MD; Travon Angulo MD; MD Rima Whitney MD; MD CHANDNI Forbes [facility]   [facility]     Progress Note    9/14/2022    4:05 PM    Name:   Emma Zimmer  MRN:     [id number]     Acct:      [de-identified]   Room:   [address] [place] [address] ENDO Pool/NONE  IP Day:  1  Admit Date:  9/12/2022  9:36 PM    PCP:   VASU Vivas  Code Status:  Full Code    Subjective:     C/C:   Chief Complaint   Patient presents with    Shortness of Breath    Dizziness     Interval History Status: improved. Seen and examined today   Feeling little better   C/o shoulder pain , xray nml       Brief History:     Emma Zimmer is a 60 y.o. Non- / non  male who presents with Shortness of Breath and Dizziness with a history of alcoholic cirrhosis and hepatitis B and is admitted to the hospital for the management of GI bleed. According to patient, he has been having lightheadedness and shortness of breath with exertion over the past week. Additionally, reports having dark stools over the past week, sating that he has several episodes of Diarrhea, which is currently resolved. Symptoms are associated with lightheadedness low blood count. Patient reports having routine blood work earlier today with hemoglobin 6.6. Reports that he received a telephone call from the gastroenterology office instructing him to come to the ED tonight for transfusion. Denies fever, chills, chest pain, cough, abdominal pain, nausea, vomiting, and urinary symptoms. Symptoms are aggravated with activity; there are no alleviating factors. Symptoms are reported as intermittent and moderate.           Review of Systems:     Constitutional:  negative for chills, fevers, sweats  Respiratory:  negative for cough, dyspnea on exertion, shortness of breath, wheezing  Cardiovascular:  negative for chest pain, chest pressure/discomfort, lower extremity edema, palpitations  Gastrointestinal:  negative for abdominal pain, constipation, diarrhea, nausea, vomiting  Neurological:  negative for dizziness, headache    Medications:      Allergies:  No Known Allergies    Current Meds:   Scheduled Meds:    [MAR Hold] midodrine  10 mg Oral TID WC    [MAR Hold] FLUoxetine  20 mg Oral Daily    [Held by provider] spironolactone  100 mg Oral TID    [Held by provider] nadolol  20 mg Oral Daily    [Held by provider] furosemide  20 mg Oral BID    [MAR Hold] sodium chloride flush  5-40 mL IntraVENous 2 times per day    [MAR Hold] cefTRIAXone (ROCEPHIN) IV  1,000 mg IntraVENous Q24H    [MAR Hold] pantoprazole (PROTONIX) 40 mg injection  40 mg IntraVENous Daily     Continuous Infusions:    [MAR Hold] sodium chloride 150 mL/hr at 09/14/22 1604    [MAR Hold] sodium chloride      [MAR Hold] octreotide (SandoSTATIN) infusion 50 mcg/hr (09/14/22 1411)    [MAR Hold] sodium chloride       PRN Meds: [MAR Hold] sodium chloride flush, [MAR Hold] sodium chloride, [MAR Hold] ondansetron **OR** [MAR Hold] ondansetron, [MAR Hold] sodium chloride    Data:     Past Medical History:   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 Alcoholic cirrhosis of liver with ascites ([facility] Anemia, Anxiety, Arthritis, Back pain, chronic, Lezama esophagus, BPH (benign prostatic hypertrophy), Cholelithiasis, Cirrhosis ([facility] COVID-19, COVID-19 vaccine series completed, DDD (degenerative disc disease), lumbar, Depression, Esophageal cancer ([facility] Esophageal varices ([facility] Fatty liver, GERD (gastroesophageal reflux disease), Hep C w/o coma, chronic ([facility] History of alcohol abuse, History of blood transfusion, History of colon polyps, History of tobacco abuse, [place] ([place] of hearing), Hyperlipidemia, Hypertension, Hyponatremia, Port-A-Cath in place, Portal hypertension ([facility] Sciatica, Secondary esophageal varices ([facility] Shortness of breath, Spinal stenosis, Stomach ulcer, Tubular adenoma of colon, Vitamin D deficiency, and Wears glasses. Social History:   reports that he quit smoking about 5 years ago. His smoking use included cigarettes. He has a 45.00 pack-year smoking history. He has never used smokeless tobacco. He reports that he does not currently use alcohol. He reports that he does not currently use drugs after having used the following drugs: Cocaine. Frequency: 1.00 time per week. Family History:   Family History   Problem Relation Age of Onset    Cancer Mother         pancreatic    Cancer Father         bone    Diabetes Sister     Asthma Brother        Vitals:  /77   Pulse 65   Temp 97.1 °F (36.2 °C) (Infrared)   Resp 16   Ht 5' 10\" (1.778 m)   Wt 183 lb 10.3 oz (83.3 kg)   SpO2 100%   BMI 26.35 kg/m²   Temp (24hrs), Av.7 °F (36.5 °C), Min:97.1 °F (36.2 °C), Max:98.2 °F (36.8 °C)    No results for input(s): POCGLU in the last 72 hours. I/O (24Hr): Intake/Output Summary (Last 24 hours) at 2022 1605  Last data filed at 2022 1300  Gross per 24 hour   Intake 3877.29 ml   Output 450 ml   Net 3427.29 ml       Labs:  Hematology:  Recent Labs     22  1457 22  2301 22  0819 22  0043 22  0756 22  1256   WBC 6.4 6.2  --   --   --   --    RBC 2.65* 2.26*  --   --   --   --    HGB 6.6* 5.6*   < > 7.4* 7.8* 7.8*   HCT 22.0* 18.6*   < > 24.2* 25.8* 25.8*   MCV 82.8 82.2  --   --   --   --    MCH 24.9* 24.8*  --   --   --   --    MCHC 30.1* 30.1*  --   --   --   --    RDW 19.2* 19.6*  --   --   --   --     191  --   --   --   --    MPV 6.1 6.8  --   --   --   --    INR  --  1.5  --   --  1.4  --     < > = values in this interval not displayed.      Chemistry:  Recent Labs     22  1457 22  2301 22  0756   * 123* 132*   K 4.4 4.2 4.3   CL 94* 94* 105   CO2 19* 19* 21   GLUCOSE 91 112* 127*   BUN 11 13 9   CREATININE 0.68* 0.70 0.68*   MG  --  1.5*  --    ANIONGAP 12 10 6* LABGLOM >60 >60 >60   GFRAA >60 >60 >60   CALCIUM 8.7 8.2* 7.8*   PROBNP  --  787*  --    TROPHS  --  22  --      Recent Labs     09/12/22  1457 09/12/22  2301   PROT 5.4* 4.9*   LABALBU 3.2* 3.0*   TSH  --  3.05   AST 32 27   ALT 16 13   ALKPHOS 134* 119   BILITOT 1.8* 1.6*   AMMONIA  --  44   LIPASE  --  23     ABG:No results found for: POCPH, PHART, PH, POCPCO2, ASE9SSJ, PCO2, POCPO2, PO2ART, PO2, POCHCO3, XBG7GFZ, HCO3, NBEA, PBEA, BEART, BE, THGBART, THB, NPP8MVC, YWFN5KYB, P1DCHSPG, O2SAT, FIO2  Lab Results   Component Value Date/Time    SPECIAL NOT REPORTED 02/02/2022 11:28 AM     Lab Results   Component Value Date/Time    CULTURE NO SIGNIFICANT GROWTH 09/12/2022 11:00 PM       Radiology:  XR CHEST PORTABLE    Result Date: 9/12/2022  Unremarkable portable chest radiograph. IR US GUIDED PARACENTESIS    Result Date: 9/9/2022  Successful ultrasound-guided paracentesis. Physical Examination:        General appearance:  alert, cooperative and no distress  Mental Status:  oriented to person, place and time and normal affect  Lungs:  clear to auscultation bilaterally, normal effort  Heart:  regular rate and rhythm, no murmur  Abdomen:  soft, nontender, nondistended, normal bowel sounds, no masses, hepatomegaly, splenomegaly  Extremities:  no edema, redness, tenderness in the calves  Skin:  no gross lesions, rashes, induration    Assessment:        Hospital Problems             Last Modified POA    * (Principal) GI bleed 9/13/2022 Yes    Secondary esophageal varices ([facility] 9/13/2022 Yes    Chronic viral hepatitis B without delta agent and without coma ([facility] 9/13/2022 Yes    Malignant neoplasm of lower third of esophagus ([facility] 9/13/2022 Yes    Anemia 9/13/2022 Yes       Plan:         Active GI bleed, melanotic stools, severe anemia due to blood loss, s/p transfusion, EGD today   Hyponatremia   Severe alcoholism with advanced cirrhosis and possible esophageal varices  Urine tract infection, started on Rocephin,  hepatitis C, continue home medications, ammonia level 44, will hold Lasix and spironolactone as blood pressure was running low  Disposition 2 days     Braden Acosta MD  9/14/2022  4:05 PM     Please note that this chart was generated using voice recognition Dragon dictation software. Although every effort was made to ensure the accuracy of this automated transcription, some errors in transcription may have occurred.

## 2022-09-14 NOTE — ANESTHESIA POSTPROCEDURE EVALUATION
Department of Anesthesiology  Postprocedure Note    Patient: Milan Vogel  MRN: [id number]  YOB: 1960  Date of evaluation: 9/14/2022      Procedure Summary     Date: 09/14/22 Room / Location: [place] [id number] / [facility]    Anesthesia Start: 1784 Anesthesia Stop: 7698    Procedure: EGD ESOPHAGOGASTRODUODENOSCOPY ENDOSCOPIC APC AT [facility] (Esophagus) Diagnosis:       Melena      (MELENA)    Surgeons:  Krystin Miranda MD Responsible Provider: Basil Huston MD    Anesthesia Type: General ASA Status: 3          Anesthesia Type: General    Yen Phase I: Yen Score: 9    Yen Phase II:        Anesthesia Post Evaluation    Comments: POST- ANESTHESIA EVALUATION       Pt Name: Milan Vogel  MRN: [id number]  YOB: 1960  Date of evaluation: 9/14/2022  Time:  6:19 PM      BP (!) 155/88   Pulse 78   Temp 97.7 °F (36.5 °C)   Resp 19   Ht 5' 10\" (1.778 m)   Wt 183 lb 10.3 oz (83.3 kg)   SpO2 100%   PF (!) 21 L/min   BMI 26.35 kg/m²      Consciousness Level  Awake  Cardiopulmonary Status  Stable  Pain Adequately Treated YES  Nausea / Vomiting  NO  Adequate Hydration  YES  Anesthesia Related Complications NONE      Electronically signed by Basil Huston MD on 9/14/2022 at 6:19 PM

## 2022-09-14 NOTE — ANESTHESIA PRE PROCEDURE
Department of Anesthesiology  Preprocedure Note       Name:  Mina Tamez   Age:  60 y.o.  :  1960                                          MRN:  [id number]         Date:  2022      Surgeon: Digna Mendez): Linda Luke MD    Procedure: Procedure(s):  EGD BIOPSY    Medications prior to admission:   Prior to Admission medications    Medication Sig Start Date End Date Taking? Authorizing Provider   nadolol (CORGARD) 20 MG tablet take 1 tablet by mouth once daily 22   Guilherme Cho, APRN - NP   midodrine (PROAMATINE) 5 MG tablet Take 2 tablets by mouth in the morning and 2 tablets at noon and 2 tablets before bedtime. 22   Guilherme Cho, APRN - NP   furosemide (LASIX) 20 MG tablet Take 1 tablet by mouth 2 times daily 22   Guilherme Cho, APRN - NP   spironolactone (ALDACTONE) 100 MG tablet Take 1 tablet by mouth every evening  Patient taking differently: Take 100 mg by mouth 3 times daily 22   Guilherme Cho, APRN - NP   ondansetron (ZOFRAN-ODT) 4 MG disintegrating tablet dissolve 1 tablet by mouth every 8 hours if needed for nausea and vomiting 22   VASU Rios   lactulose [facility]) 10 GM/15ML solution take 30 milliliters by mouth three times a day 22   VASU Rios   FLUoxetine (PROZAC) 20 MG capsule Take 1 capsule by mouth daily 22   Marc Pizarro MD   atorvastatin (LIPITOR) 20 MG tablet Take 1 tablet by mouth nightly 22   Marc Pizarro MD   ferrous sulfate (IRON 325) 325 (65 Fe) MG tablet Take 1 tablet by mouth 2 times daily 22   VASU Rios       Current medications:    No current facility-administered medications for this visit. No current outpatient medications on file.      Facility-Administered Medications Ordered in Other Visits   Medication Dose Route Frequency Provider Last Rate Last Admin    midodrine (PROAMATINE) tablet 10 mg  10 mg Oral TID  MASSIMO Gallo - CNP   10 mg at 22 [id number]    FLUoxetine (PROZAC) capsule 20 mg  20 mg Oral Daily Williamson Porch, APRN - CNP   20 mg at 09/14/22 1433    [Held by provider] spironolactone (ALDACTONE) tablet 100 mg  100 mg Oral TID Williamson Porch, APRN - CNP        [Held by provider] nadolol (CORGARD) tablet 20 mg  20 mg Oral Daily Williamson Porch, APRN - CNP        [Held by provider] furosemide (LASIX) tablet 20 mg  20 mg Oral BID Williamson Porch, APRN - CNP        0.9 % sodium chloride infusion   IntraVENous Continuous Williamson Porch, APRN -  mL/hr at 09/14/22 0453 Rate Verify at 09/14/22 0453    sodium chloride flush 0.9 % injection 5-40 mL  5-40 mL IntraVENous 2 times per day Williamson Porch, APRN - CNP   10 mL at 09/14/22 0839    sodium chloride flush 0.9 % injection 5-40 mL  5-40 mL IntraVENous PRN Williamson Porch, APRN - CNP        0.9 % sodium chloride infusion   IntraVENous PRN Williamson Porch, APRN - CNP        ondansetron (ZOFRAN-ODT) disintegrating tablet 4 mg  4 mg Oral Q8H PRN Williamson Porch, APRN - CNP        Or    [facility] injection 4 mg  4 mg IntraVENous Q6H PRN Williamson Porch, APRN - CNP   4 mg at 09/13/22 0249    cefTRIAXone (ROCEPHIN) 1,000 mg in sodium chloride 0.9 % 50 mL IVPB mini-bag  1,000 mg IntraVENous Q24H Williamson Porch, APRN - CNP   Stopped at 09/13/22 2334    pantoprazole (PROTONIX) 40 mg in sodium chloride (PF) 10 mL injection  40 mg IntraVENous Daily Carmelitamian Lynin, APRN - CNP   40 mg at 09/14/22 0838    octreotide (SANDOSTATIN) 500 mcg in sodium chloride 0.9 % 100 mL infusion  50 mcg/hr IntraVENous Continuous Cody Marroquin MD 10 mL/hr at 09/14/22 0618 50 mcg/hr at 09/14/22 0618    0.9 % sodium chloride infusion   IntraVENous PRN Cody Marroquin MD           Allergies:  No Known Allergies    Problem List:    Patient Active Problem List   Diagnosis Code    Back pain, chronic M54.9, G89.29    Hearing difficulty H91.90    GERD (gastroesophageal reflux disease) K21.9    Cervical radicular pain M54.12    Alcohol withdrawal syndrome without complication (HCC) I16.891    Hypomagnesemia E83.42    Chronic viral hepatitis B without delta agent and without coma (HCC) B18.1    Calculus of gallbladder without cholecystitis K80.20    Hep C w/o coma, chronic (HCC) B18.2    Fatty liver K76.0    Psychophysiologic insomnia F51.04    Cirrhosis (HCC) K74.60    Hepatic cirrhosis (HCC) K74.60    Vertebrogenic low back pain M54.51    DDD (degenerative disc disease), lumbar M51.36    Depression F32. A    Tubular adenoma of colon D12.6    History of colon polyps Z86.010    Gynecomastia, male N58    Lumbar radiculitis M54.16    Lumbar disc herniation M51.26    Tinnitus H93.19    Eustachian tube dysfunction H69.80    Ganglion cyst M67.40    Carpal tunnel syndrome of right wrist G56.01    History of hepatitis C Z86.19    Vitamin D deficiency E55.9    Pure hypercholesterolemia E78.00    Hypokalemia E87.6    Essential hypertension I10    Recurrent major depressive disorder in partial remission (HCC) F33.41    S/P epidural steroid injection Z92.241    Elevated LFTs R79.89    Seasonal allergies J30.2    Lumbar facet arthropathy M47.816    Cervical facet syndrome M47.812    Acute gastrointestinal bleeding K92.2    Thrombocytopenia (HCC) D69.6    Hepatitis C virus infection resolved after antiviral drug therapy Z86.19    Gastrointestinal hemorrhage with melena K92.1    Alcohol abuse F10.10    Altered mental status R41.82    Hypocalcemia E83.51    Hypophosphatemia E83.39    Malignant neoplasm of lower third of esophagus (HCC) C15.5    COVID-19 U07.1    Anxiety F41.9    Current smoker F17.200    Severe comorbid illness R69    Gait instability R26.81    Abnormal findings on diagnostic imaging of spine R93.7    Cervical spinal stenosis M48.02    Spinal stenosis of lumbar region with neurogenic claudication M48.062    Low hemoglobin D64.9    Symptomatic anemia, microcytic, acute D64.9    Hypotension I95.9  Former smoker, 50+ pack years, quit 2016 Z87.891    HLD (hyperlipidemia) E78.5    Esophageal adenocarcinoma ([address] C15.9    Anemia D64.9    Acute kidney injury ([address] N17.9    Ascites R18.8    Shortness of breath R06.02    Drop in hemoglobin R71.0    Portal hypertension (HCC) K76.6    Secondary esophageal varices (HCC) I85.10    Esophageal polyp K22.81    Acute kidney failure, unspecified (HCC) N17.9    Muscle weakness (generalized) M62.81    Other abnormalities of gait and mobility R26.89    GI bleed K92.2       Past Medical History:        Diagnosis Date    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 ([address]     Alcoholic cirrhosis of liver with ascites ([address]     Anemia 04/13/2022    Anxiety     Arthritis     Back pain, chronic     dr. Slava Jimenez, orthopedic, every 3-4 months, gets steroid injection    Lezama esophagus     BPH (benign prostatic hypertrophy)     Cholelithiasis     Cirrhosis ([address]     COVID-19 12/2020    pt reports he had a positive test while at [facility] in 2020, was asymptomatic    COVID-19 vaccine series completed 5/20/2021, 6/22/2021    Moderna 5/20/2021, 6/22/2021    DDD (degenerative disc disease), lumbar     Depression     Esophageal cancer ([address]     INVASIVE ADENOCARCINOMA ARISING IN TUBULAR ADENOMA WITH HIGH GRADE DYSPLASIA, ASSOCIATED WITH FOCAL INTESTINAL METAPLASIA     Esophageal varices (HCC)     Fatty liver     GERD (gastroesophageal reflux disease)     Hep C w/o coma, chronic (HCC)     History of alcohol abuse     6-12 beers a day; quit drinking 2019    History of blood transfusion     History of colon polyps 2016    History of tobacco abuse     [place] ([place])     Hyperlipidemia     Hypertension     Hyponatremia 07/20/2016    Port-A-Cath in place     right upper chest    Portal hypertension (HCC)     Sciatica     Secondary esophageal varices (HCC) 06/07/2022    Shortness of breath     Spinal stenosis     Stomach ulcer     hx of    Tubular adenoma of colon 2016, 2018    Vitamin D deficiency     Wears glasses        Past Surgical History:        Procedure Laterality Date    BUNIONECTOMY      twice on right side    BUNIONECTOMY Left     CARPAL TUNNEL RELEASE Right     COLONOSCOPY      at age 35    COLONOSCOPY  10/05/2016    polyps-pathology tubular adenoma, and abnormal looking mucosa right colon-pathology-tubular adenoma    COLONOSCOPY N/A 03/30/2018    COLONOSCOPY POLYPECTOMY COLD BIOPSY performed by Tracie Floyd MD at [address] [place] [address]  03/30/2018    Small polyp in the sigmoid colon and excised with biopsy forceps--tubular adenoma    COLONOSCOPY N/A 04/16/2022    COLONOSCOPY POLYPECTOMY performed by Tracie Floyd MD at [address] [place] [address], COLON, DIAGNOSTIC      EGD    IR PORT PLACEMENT EQUAL OR GREATER THAN 5 YEARS  04/19/2021    IR PORT PLACEMENT EQUAL OR GREATER THAN 5 YEARS 4/19/2021 [facility]    KNEE SURGERY Left     cyst removed    NASAL SEPTUM SURGERY      NERVE BLOCK Right 11/23/2020    NERVE BLOCK RIGHT CERVICAL STEROID INJECTION  C3-C6 performed by Jose Arreola MD at [facility]  01/04/2016    steroid injection C7 T1    OTHER SURGICAL HISTORY  11/21/2016    Bilateral Lumbar CACHORRO L4-L5 injections    OTHER SURGICAL HISTORY  12/19/2016    lumbar steroid injection    OTHER SURGICAL HISTORY  09/28/2018    BILATERAL L5 CACHORRO (N/A Back)    OTHER SURGICAL HISTORY Right 11/23/2020    cervical injection    PAIN MANAGEMENT PROCEDURE Left 07/09/2020    EPIDURAL STEROID INJECTION LEFT L4 L5 performed by Jose rAreola MD at [facility] Left 07/20/2020    LEFT L4 L5 EPIDURAL STEROID INJECTION performed by Jose Arreola MD at [place] [address]572 Bilateral 08/17/2020    LUMBAR FACET BILATERAL L2-L5 performed by Jose Arreola MD at [place] [address]572 Bilateral 12/07/2020    NERVE BLOCK BILATERAL LUMBAR MEDIAL BRANCH L2-L5 performed by Betty Brandon MD at [address]      Multiple    [facility] [place] [address] [address] AA&/STRD TFRML EPI LUMBAR/SACRAL 1 LEVEL Bilateral 2018    BILATERAL L5 CACHORRO performed by Betty Brandon MD at [address] AA&/STRD TFRML EPI LUMBAR/SACRAL 1 LEVEL N/A 2018    BILATERAL L5 CACHORRO performed by Betty Brandon MD at [facility] N/CARPAL TUNNEL SURG Right 2017    CARPAL TUNNEL RELEASE RIGHT performed by Kimani Gonzáles MD at [address] [facility] N/CARPAL TUNNEL SURG Left 10/31/2017    CARPAL TUNNEL RELEASE performed by Kimani Gonzáles MD at [facility] 2020    EGD BIOPSY performed by Maddie Serrato MD at [facility] 2021    EGD BIOPSY and spot marking performed by Benji Buitrago MD at [facility] 2021    ENDOSCOPIC ULTRASOUND, EGD performed by She Ellsworth MD at [address] [place] [address]  2021    EGD DIAGNOSTIC ONLY performed by She Ellsworth MD at [address] [place] [address] N/A 2021    EGD BIOPSY performed by Benji Buitrago MD at [facility] 2022    EGD BIOPSY performed by Benji Buitrago MD at [facility] 04/15/2022    EGD ESOPHAGOGASTRODUODENOSCOPY performed by Maddie Serrato MD at [facility] N/ 2022    EGD BAND LIGATION performed by Stephanie Vencse MD at [facility] N/ 2022    EGD ESOPHAGOGASTRODUODENOSCOPY performed by Stephanie Vences MD at [facility] History:    Social History     Tobacco Use    Smoking status: Former     Packs/day: 1.00     Years: 45.00     Pack years: 45.00     Types: Cigarettes     Quit date: 2017     Years since quittin.6  Smokeless tobacco: Never   Substance Use Topics    Alcohol use: Not Currently     Comment: Quit alcohol in 2019- heavier drinking prior to quitting                                Counseling given: Not Answered      Vital Signs (Current): There were no vitals filed for this visit. BP Readings from Last 3 Encounters:   09/14/22 (!) 88/57   09/09/22 (!) 105/56   09/02/22 132/70       NPO Status:                                                                                 BMI:   Wt Readings from Last 3 Encounters:   09/13/22 183 lb 10.3 oz (83.3 kg)   09/09/22 177 lb 1.6 oz (80.3 kg)   09/02/22 189 lb 3.2 oz (85.8 kg)     There is no height or weight on file to calculate BMI.    CBC:   Lab Results   Component Value Date/Time    WBC 6.2 09/12/2022 11:01 PM    RBC 2.26 09/12/2022 11:01 PM    RBC 4.75 04/19/2012 11:30 AM    HGB 7.8 09/14/2022 07:56 AM    HCT 25.8 09/14/2022 07:56 AM    MCV 82.2 09/12/2022 11:01 PM    RDW 19.6 09/12/2022 11:01 PM     09/12/2022 11:01 PM     04/19/2012 11:30 AM       CMP:   Lab Results   Component Value Date/Time     09/14/2022 07:56 AM    K 4.3 09/14/2022 07:56 AM     09/14/2022 07:56 AM    CO2 21 09/14/2022 07:56 AM    BUN 9 09/14/2022 07:56 AM    CREATININE 0.68 09/14/2022 07:56 AM    GFRAA >60 09/14/2022 07:56 AM    LABGLOM >60 09/14/2022 07:56 AM    GLUCOSE 127 09/14/2022 07:56 AM    GLUCOSE 65 04/19/2012 11:30 AM    PROT 4.9 09/12/2022 11:01 PM    CALCIUM 7.8 09/14/2022 07:56 AM    BILITOT 1.6 09/12/2022 11:01 PM    ALKPHOS 119 09/12/2022 11:01 PM    AST 27 09/12/2022 11:01 PM    ALT 13 09/12/2022 11:01 PM       POC Tests: No results for input(s): POCGLU, POCNA, POCK, POCCL, POCBUN, POCHEMO, POCHCT in the last 72 hours.     Coags:   Lab Results   Component Value Date/Time    PROTIME 16.7 09/14/2022 07:56 AM    INR 1.4 09/14/2022 07:56 AM    APTT 31.4 09/14/2022 07:56 AM       HCG (If Applicable): No results found for: PREGTESTUR, PREGSERUM, HCG, HCGQUANT     ABGs: No results found for: PHART, PO2ART, ZAX5WRP, XHP6JAS, BEART, D5GMDUKD     Type & Screen (If Applicable):  No results found for: LABABO, LABRH    Drug/Infectious Status (If Applicable):  Lab Results   Component Value Date/Time    HEPCAB REACTIVE 12/23/2020 05:09 PM       COVID-19 Screening (If Applicable):   Lab Results   Component Value Date/Time    COVID19 Not Detected 09/12/2022 10:32 PM    COVID19 Not Detected 07/17/2020 10:00 AM           Anesthesia Evaluation  Patient summary reviewed and Nursing notes reviewed no history of anesthetic complications:   Airway: Mallampati: II  TM distance: >3 FB   Neck ROM: full  Mouth opening: > = 3 FB   Dental: normal exam         Pulmonary:normal exam  breath sounds clear to auscultation  (+) shortness of breath:  current smoker                           Cardiovascular:    (+) hypertension (now hypotensive):, hyperlipidemia      ECG reviewed  Rhythm: regular  Rate: normal  Echocardiogram reviewed               ROS comment: Mild left ventricular hypertrophy. Global left ventricular systolic function is normal.  Estimated LV EF 60-65 %. (12/2021)     Neuro/Psych:   (+) neuromuscular disease:, psychiatric history:depression/anxiety              ROS comment: DDD - Lumbar region  Cervical Stenosis GI/Hepatic/Renal:   (+) GERD:, PUD, hepatitis: C and B, liver disease (cirrhosis): portal hypertension, renal disease (BPH (benign prostatic hypertrophy)):,          ROS comment: H/o Esophageal Adenocarcinoma   FOBT positive  Lezama esophagus  Abdominal distension - pt admits to having Ascites in the past with drainage - last paracentesis 9/9/22. Endo/Other:    (+) blood dyscrasia (being transfused): anemia:., electrolyte abnormalities (Hyponatremia, Hypocalcemia), Cancer: Esophageal Adenocarcinoma s/p chemo and radiation. .    Cancer: Esophageal Adenocarcinoma s/p chemo and radiation.                 ROS comment:  History of alcohol abuse Abdominal:             Vascular: negative vascular ROS. Other Findings:             Anesthesia Plan      general     ASA 3       Induction: intravenous. MIPS: Prophylactic antiemetics administered. Anesthetic plan and risks discussed with patient. Plan discussed with CRNA.                     Travon Dugan MD   9/14/2022

## 2022-09-15 ENCOUNTER — APPOINTMENT (OUTPATIENT)
Dept: INTERVENTIONAL RADIOLOGY/VASCULAR | Age: 62
DRG: 378 | End: 2022-09-15
Payer: MEDICARE

## 2022-09-15 LAB
ANION GAP SERPL CALCULATED.3IONS-SCNC: 5 MMOL/L (ref 9–17)
BUN BLDV-MCNC: 6 MG/DL (ref 8–23)
CALCIUM SERPL-MCNC: 7.9 MG/DL (ref 8.6–10.4)
CHLORIDE BLD-SCNC: 107 MMOL/L (ref 98–107)
CO2: 20 MMOL/L (ref 20–31)
CREAT SERPL-MCNC: 0.61 MG/DL (ref 0.7–1.2)
GFR AFRICAN AMERICAN: >60 ML/MIN
GFR NON-AFRICAN AMERICAN: >60 ML/MIN
GFR SERPL CREATININE-BSD FRML MDRD: ABNORMAL ML/MIN/{1.73_M2}
GLUCOSE BLD-MCNC: 126 MG/DL (ref 70–99)
HCT VFR BLD CALC: 22.9 % (ref 41–53)
HCT VFR BLD CALC: 25 % (ref 41–53)
HCT VFR BLD CALC: 25.3 % (ref 41–53)
HCT VFR BLD CALC: 27.4 % (ref 41–53)
HEMOGLOBIN: 6.8 G/DL (ref 13.5–17.5)
HEMOGLOBIN: 7.6 G/DL (ref 13.5–17.5)
HEMOGLOBIN: 7.8 G/DL (ref 13.5–17.5)
HEMOGLOBIN: 8.3 G/DL (ref 13.5–17.5)
POTASSIUM SERPL-SCNC: 4.1 MMOL/L (ref 3.7–5.3)
SODIUM BLD-SCNC: 132 MMOL/L (ref 135–144)

## 2022-09-15 PROCEDURE — 99232 SBSQ HOSP IP/OBS MODERATE 35: CPT | Performed by: INTERNAL MEDICINE

## 2022-09-15 PROCEDURE — APPSS30 APP SPLIT SHARED TIME 16-30 MINUTES: Performed by: NURSE PRACTITIONER

## 2022-09-15 PROCEDURE — 6370000000 HC RX 637 (ALT 250 FOR IP): Performed by: INTERNAL MEDICINE

## 2022-09-15 PROCEDURE — P9047 ALBUMIN (HUMAN), 25%, 50ML: HCPCS | Performed by: RADIOLOGY

## 2022-09-15 PROCEDURE — 6360000002 HC RX W HCPCS: Performed by: INTERNAL MEDICINE

## 2022-09-15 PROCEDURE — 85014 HEMATOCRIT: CPT

## 2022-09-15 PROCEDURE — P9016 RBC LEUKOCYTES REDUCED: HCPCS

## 2022-09-15 PROCEDURE — 2580000003 HC RX 258: Performed by: INTERNAL MEDICINE

## 2022-09-15 PROCEDURE — 86850 RBC ANTIBODY SCREEN: CPT

## 2022-09-15 PROCEDURE — C9113 INJ PANTOPRAZOLE SODIUM, VIA: HCPCS | Performed by: INTERNAL MEDICINE

## 2022-09-15 PROCEDURE — 36415 COLL VENOUS BLD VENIPUNCTURE: CPT

## 2022-09-15 PROCEDURE — 86900 BLOOD TYPING SEROLOGIC ABO: CPT

## 2022-09-15 PROCEDURE — 2060000000 HC ICU INTERMEDIATE R&B

## 2022-09-15 PROCEDURE — 2709999900 IR US GUIDED PARACENTESIS

## 2022-09-15 PROCEDURE — 80048 BASIC METABOLIC PNL TOTAL CA: CPT

## 2022-09-15 PROCEDURE — 86920 COMPATIBILITY TEST SPIN: CPT

## 2022-09-15 PROCEDURE — 6360000002 HC RX W HCPCS: Performed by: RADIOLOGY

## 2022-09-15 PROCEDURE — 85018 HEMOGLOBIN: CPT

## 2022-09-15 PROCEDURE — 49083 ABD PARACENTESIS W/IMAGING: CPT

## 2022-09-15 PROCEDURE — A4216 STERILE WATER/SALINE, 10 ML: HCPCS | Performed by: INTERNAL MEDICINE

## 2022-09-15 PROCEDURE — 0W9G3ZZ DRAINAGE OF PERITONEAL CAVITY, PERCUTANEOUS APPROACH: ICD-10-PCS | Performed by: INTERNAL MEDICINE

## 2022-09-15 PROCEDURE — 86901 BLOOD TYPING SEROLOGIC RH(D): CPT

## 2022-09-15 PROCEDURE — 36430 TRANSFUSION BLD/BLD COMPNT: CPT

## 2022-09-15 RX ORDER — ATORVASTATIN CALCIUM 20 MG/1
20 TABLET, FILM COATED ORAL NIGHTLY
Status: DISCONTINUED | OUTPATIENT
Start: 2022-09-15 | End: 2022-09-16 | Stop reason: HOSPADM

## 2022-09-15 RX ORDER — FERROUS SULFATE 325(65) MG
325 TABLET ORAL 2 TIMES DAILY
Status: DISCONTINUED | OUTPATIENT
Start: 2022-09-15 | End: 2022-09-16 | Stop reason: HOSPADM

## 2022-09-15 RX ORDER — OXYCODONE HYDROCHLORIDE AND ACETAMINOPHEN 5; 325 MG/1; MG/1
1 TABLET ORAL EVERY 8 HOURS PRN
Status: DISCONTINUED | OUTPATIENT
Start: 2022-09-15 | End: 2022-09-16 | Stop reason: HOSPADM

## 2022-09-15 RX ORDER — ALBUMIN (HUMAN) 12.5 G/50ML
25 SOLUTION INTRAVENOUS ONCE
Status: COMPLETED | OUTPATIENT
Start: 2022-09-15 | End: 2022-09-15

## 2022-09-15 RX ORDER — LACTULOSE 10 G/15ML
20 SOLUTION ORAL 2 TIMES DAILY PRN
Status: DISCONTINUED | OUTPATIENT
Start: 2022-09-15 | End: 2022-09-16 | Stop reason: HOSPADM

## 2022-09-15 RX ORDER — SODIUM CHLORIDE 9 MG/ML
INJECTION, SOLUTION INTRAVENOUS PRN
Status: DISCONTINUED | OUTPATIENT
Start: 2022-09-15 | End: 2022-09-16 | Stop reason: HOSPADM

## 2022-09-15 RX ADMIN — ONDANSETRON 4 MG: 2 INJECTION INTRAMUSCULAR; INTRAVENOUS at 09:44

## 2022-09-15 RX ADMIN — ALBUMIN (HUMAN) 25 G: 0.25 INJECTION, SOLUTION INTRAVENOUS at 15:56

## 2022-09-15 RX ADMIN — OCTREOTIDE ACETATE 50 MCG/HR: 500 INJECTION, SOLUTION INTRAVENOUS; SUBCUTANEOUS at 12:47

## 2022-09-15 RX ADMIN — FLUOXETINE 20 MG: 20 CAPSULE ORAL at 09:31

## 2022-09-15 RX ADMIN — OXYCODONE AND ACETAMINOPHEN 1 TABLET: 5; 325 TABLET ORAL at 14:54

## 2022-09-15 RX ADMIN — SODIUM CHLORIDE: 9 INJECTION, SOLUTION INTRAVENOUS at 20:50

## 2022-09-15 RX ADMIN — SODIUM CHLORIDE 40 MG: 9 INJECTION INTRAMUSCULAR; INTRAVENOUS; SUBCUTANEOUS at 09:32

## 2022-09-15 RX ADMIN — OXYCODONE AND ACETAMINOPHEN 1 TABLET: 5; 325 TABLET ORAL at 21:36

## 2022-09-15 RX ADMIN — ATORVASTATIN CALCIUM 20 MG: 20 TABLET, FILM COATED ORAL at 20:56

## 2022-09-15 RX ADMIN — ALBUMIN (HUMAN) 25 G: 0.25 INJECTION, SOLUTION INTRAVENOUS at 17:17

## 2022-09-15 RX ADMIN — FUROSEMIDE 20 MG: 20 TABLET ORAL at 20:57

## 2022-09-15 RX ADMIN — FERROUS SULFATE TAB 325 MG (65 MG ELEMENTAL FE) 325 MG: 325 (65 FE) TAB at 20:56

## 2022-09-15 RX ADMIN — MIDODRINE HYDROCHLORIDE 10 MG: 10 TABLET ORAL at 09:31

## 2022-09-15 RX ADMIN — SODIUM CHLORIDE: 9 INJECTION, SOLUTION INTRAVENOUS at 04:49

## 2022-09-15 RX ADMIN — OCTREOTIDE ACETATE 50 MCG/HR: 500 INJECTION, SOLUTION INTRAVENOUS; SUBCUTANEOUS at 03:14

## 2022-09-15 RX ADMIN — FERROUS SULFATE TAB 325 MG (65 MG ELEMENTAL FE) 325 MG: 325 (65 FE) TAB at 17:09

## 2022-09-15 RX ADMIN — ALBUMIN (HUMAN) 25 G: 0.25 INJECTION, SOLUTION INTRAVENOUS at 15:45

## 2022-09-15 RX ADMIN — SPIRONOLACTONE 100 MG: 25 TABLET ORAL at 20:57

## 2022-09-15 RX ADMIN — SODIUM CHLORIDE, PRESERVATIVE FREE 10 ML: 5 INJECTION INTRAVENOUS at 21:29

## 2022-09-15 ASSESSMENT — PAIN DESCRIPTION - LOCATION: LOCATION: GENERALIZED

## 2022-09-15 ASSESSMENT — PAIN SCALES - GENERAL
PAINLEVEL_OUTOF10: 8
PAINLEVEL_OUTOF10: 8
PAINLEVEL_OUTOF10: 0

## 2022-09-15 NOTE — OP NOTE
Operative Note      Patient: Shauna León  YOB: 1960  MRN: [id number]    Date of Procedure: 9/14/2022    Pre-Op Diagnosis: MELENA    Post-Op Diagnosis: Angiectasis of GE junction presumably from prior radiation s/p APC. ATTENDING: Joanna Sands MD.    PROCEDURE: Esophagogastroduodenoscopy (EGD). INDICATION: Acute on chronic anemia/Cirrhosis of Liver. History of esophageal cancer. CONSENT: Informed consent was obtained after explaining the risks of procedure in detail including bleeding, perforation, aspiration, arrhythmia, risks related to anesthesia and sedation medication, benefits, alternatives and complications including surgery, prolonged hospitalization and rarely death to the patient. Patient verbalized understanding and agreed to proceed with procedure. ANESTHESIA: MAC    DESCRICPTION OF PROCEDURE: The patient was placed in left lateral decubitus position. Oxygen and cardiac monitoring document was attached. Patient's vital signs were continuously monitored for the procedure. After appropriate sedation was achieved, an Olympus adult gastroscope  was advanced under direct vision for patient's oral cavity into esophagus, stomach, first and second part of duodenum without any difficulty. Examination of first and second part of duodenum was normal.  Endoscope was then withdrawn into gastric antrum. Examination of gastric antrum was normal as well. On retroflexion, gastric cardia, fundus and body appeared normal.  Endoscope was then withdrawn into esophagus. Examination of esophagus showed multiple angiectasias at gastroesophageal junction and in gastric fundus presumably related to prior radiation. These were ablated using APC. No convincing evidence of esophageal varices. Post banding scarring was seen in esophagus. Esophagus was noted to have bluish discoloration likely from a prior tattoo .   Rest of the esophagus appeared normal. Endoscope was then withdrawn out of patient's oral cavity. There were no immediate complications. The patient tolerated the procedure well. ENDOSCOPIC IMPRESSION:  1. Multiple angiectasias in gastric fundus and esophagogastric junction presumably related to prior radiation. APC was performed. 2.  Post banding scaring was seen in lower esophagus. No convincing evidence of residual esophageal varices. 2.  Bluish discoloration in lower esophagus (a prior tattoo?)  3. No gastric varices or portal HTN gastropathy. Normal gastric body and antrum. Pylorus was normal.  No evidence of gastric ulcer or outlet obstruction. 4.  Normal first and second part of duodenum. RECOMMENDATIONS:  1.  Observe clinical course. 2.  Protonix 40 mg PO bid for 6-8 weeks. 3.  Repeat EGD in 3-4 weeks for revaluation. 4.  Monitor Hb.         Electronically signed by Mathew Lezama MD on 9/15/2022 at 3:19 AM Home Suture Removal Text: Patient was provided instructions on removing sutures and will remove their sutures at home.  If they have any questions or difficulties they will call the office.

## 2022-09-15 NOTE — BRIEF OP NOTE
Brief Postoperative Note    Aisha Thomas  YOB: 1960  [id number]    Pre-operative Diagnosis: Ascites    Post-operative Diagnosis: Same    Procedure: Paracentesis    Anesthesia: Local    Surgeons/Assistants: Gil Ch MD     Estimated Blood Loss: minimal    Complications: none immediate    Specimens: were not obtained    Findings: U/S guided right paracentesis yielding 9.3 L yellow fluid. Skin puncture had to be repeated. IV albumin Tx initiated.      Electronically signed by Gil Ch MD on 9/15/2022 at 5:04 PM

## 2022-09-15 NOTE — CARE COORDINATION
ONGOING DISCHARGE PLAN:    Patient is alert and oriented x4. Spoke with patient regarding discharge plan and patient confirms that plan is still to discharge to home with no needs    Patient had a EGD done yesterday     Today HBG is 7.8    Hold Lasix and Spironolactone     Patient started on IV ATB      Will continue to follow for additional discharge needs.     Electronically signed by Art Guo RN on 9/15/2022 at 11:34 AM

## 2022-09-15 NOTE — PROGRESS NOTES
edema  Musculoskeletal: normal range of motion, no joint swelling, deformity or tenderness  Neurologic: no cranial nerve deficit and muscle strength normal    Lab and Imaging Review     CBC  Recent Labs     09/12/22  1457 09/12/22  2301 09/13/22  0819 09/14/22 1917 09/15/22  0104 09/15/22  0654   WBC 6.4 6.2  --   --   --   --    HGB 6.6* 5.6*   < > 8.2* 7.6* 7.8*   HCT 22.0* 18.6*   < > 27.0* 25.3* 25.0*   MCV 82.8 82.2  --   --   --   --     191  --   --   --   --     < > = values in this interval not displayed. BMP  Recent Labs     09/12/22 2301 09/14/22  0756 09/15/22  0654   * 132* 132*   K 4.2 4.3 4.1   CL 94* 105 107   CO2 19* 21 20   BUN 13 9 6*   CREATININE 0.70 0.68* 0.61*   GLUCOSE 112* 127* 126*   CALCIUM 8.2* 7.8* 7.9*       LFTS  Recent Labs     09/12/22  1457 09/12/22  2301   ALKPHOS 134* 119   ALT 16 13   AST 32 27   PROT 5.4* 4.9*   BILITOT 1.8* 1.6*   LABALBU 3.2* 3.0*       AMYLASE/LIPASE/AMMONIA  Recent Labs     09/12/22  2301   LIPASE 23   AMMONIA 44       PT/INR  Recent Labs     09/12/22 2301 09/14/22  0756   PROTIME 17.7* 16.7*   INR 1.5 1.4         ANEMIA STUDIES  Recent Labs     09/12/22  2301   LABIRON 15*   TIBC 191*     Egd dr Brennan Wilde 9/14/22  ENDOSCOPIC IMPRESSION:  1. Multiple angiectasias in gastric fundus and esophagogastric junction presumably related to prior radiation. APC was performed. 2.  Post banding scaring was seen in lower esophagus. No convincing evidence of residual esophageal varices. 2.  Bluish discoloration in lower esophagus (a prior tattoo?)  3. No gastric varices or portal HTN gastropathy. Normal gastric body and antrum. Pylorus was normal.  No evidence of gastric ulcer or outlet obstruction. 4.  Normal first and second part of duodenum.     ASSESSMENT/plan  Anemia with melena s/p egd yesterday showing avms and post banding scarring  -ppi  Trend hh  Outpt egd in 3-4 weeks  Diet as tolerated-2gm low salt    2.cirrhosis secondary to etoh and treated hepatitis c  Will stop sandostatin    3.uti mgt per primary service    Gi signing off f/u outpt-pt informed      Time spent reviewing chart assessing pt and d/w attending md around 30 minutes      This plan was formulated in collaboration with Dr. Fatuma Trejo  .     Electronically signed by: MASSIMO Zhou CNP on 9/15/2022 at 12:35 PM

## 2022-09-15 NOTE — PLAN OF CARE
Problem: Discharge Planning  Goal: Discharge to home or other facility with appropriate resources  9/15/2022 0539 by Talia Oscar RN  Outcome: Progressing     Problem: Pain  Goal: Verbalizes/displays adequate comfort level or baseline comfort level  9/15/2022 0539 by Talia Oscar RN  Outcome: Progressing     Problem: Safety - Adult  Goal: Free from fall injury  9/15/2022 0539 by Talia Oscar RN  Outcome: Progressing   The patient remained free from falls this shift, call light within reach, bed in locked and lowest position. Side rails up x2. Continue to monitor closely.

## 2022-09-15 NOTE — PROGRESS NOTES
PAULY [facility] Medicine  Gabriella Álvarez MD; Kumar Jackson MD; Zeeshan Wise MD; MD Loreto Bright MD; MD [facility]   [address] [facility]     Progress Note    9/15/2022    2:30 PM    Name:   Mina Tamez  MRN:     [id number]     Acct:      [de-identified]   Room:   2116/2116-01  IP Day:  2  Admit Date:  9/12/2022  9:36 PM    PCP:   VASU Rios  Code Status:  Full Code    Subjective:     C/C:   Chief Complaint   Patient presents with    Shortness of Breath    Dizziness     Interval History Status: improved. Seen and examined today   Feeling little better   C/o shoulder pain , xray nml       Brief History:     Mina Tamez is a 60 y.o. Non- / non  male who presents with Shortness of Breath and Dizziness with a history of alcoholic cirrhosis and hepatitis B and is admitted to the hospital for the management of GI bleed. According to patient, he has been having lightheadedness and shortness of breath with exertion over the past week. Additionally, reports having dark stools over the past week, sating that he has several episodes of Diarrhea, which is currently resolved. Symptoms are associated with lightheadedness low blood count. Patient reports having routine blood work earlier today with hemoglobin 6.6. Reports that he received a telephone call from the gastroenterology office instructing him to come to the ED tonight for transfusion. Denies fever, chills, chest pain, cough, abdominal pain, nausea, vomiting, and urinary symptoms. Symptoms are aggravated with activity; there are no alleviating factors. Symptoms are reported as intermittent and moderate.           Review of Systems:     Constitutional:  negative for chills, fevers, sweats  Respiratory:  negative for cough, dyspnea on exertion, shortness of breath, wheezing  Cardiovascular:  negative for chest pain, chest pressure/discomfort, lower extremity edema, palpitations  Gastrointestinal:  negative for abdominal pain, constipation, diarrhea, nausea, vomiting  Neurological:  negative for dizziness, headache    Medications:      Allergies:  No Known Allergies    Current Meds:   Scheduled Meds:    pantoprazole (PROTONIX) 40 mg injection  40 mg IntraVENous Q12H    ferrous sulfate  325 mg Oral BID    atorvastatin  20 mg Oral Nightly    midodrine  10 mg Oral TID WC    FLUoxetine  20 mg Oral Daily    spironolactone  100 mg Oral TID    nadolol  20 mg Oral Daily    furosemide  20 mg Oral BID    sodium chloride flush  5-40 mL IntraVENous 2 times per day    cefTRIAXone (ROCEPHIN) IV  1,000 mg IntraVENous Q24H    pantoprazole (PROTONIX) 40 mg injection  40 mg IntraVENous Daily     Continuous Infusions:    sodium chloride 150 mL/hr at 09/15/22 0449    sodium chloride      octreotide (SandoSTATIN) infusion 50 mcg/hr (09/15/22 1247)    sodium chloride       PRN Meds: lactulose, sodium chloride flush, sodium chloride, ondansetron **OR** ondansetron, sodium chloride    Data:     Past Medical History:   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 ([facility] [address] Alcoholic cirrhosis of liver with ascites ([facility] [address] Anemia, Anxiety, Arthritis, Back pain, chronic, Lezama esophagus, BPH (benign prostatic hypertrophy), Cholelithiasis, Cirrhosis ([facility] [address] COVID-19, COVID-19 vaccine series completed, DDD (degenerative disc disease), lumbar, Depression, Esophageal cancer ([facility] [address] Esophageal varices ([facility] [address] Fatty liver, GERD (gastroesophageal reflux disease), Hep C w/o coma, chronic ([facility] [address] History of alcohol abuse, History of blood transfusion, History of colon polyps, History of tobacco abuse, [place] (hard of hearing), Hyperlipidemia, Hypertension, Hyponatremia, Port-A-Cath in place, Portal hypertension ([facility] [address] Sciatica, Secondary esophageal varices ([facility] [address] Shortness of breath, Spinal stenosis, Stomach ulcer, Tubular adenoma of colon, Vitamin D deficiency, and Wears glasses. Social History:   reports that he quit smoking about 5 years ago. His smoking use included cigarettes. He has a 45.00 pack-year smoking history. He has never used smokeless tobacco. He reports that he does not currently use alcohol. He reports that he does not currently use drugs after having used the following drugs: Cocaine. Frequency: 1.00 time per week. Family History:   Family History   Problem Relation Age of Onset    Cancer Mother         pancreatic    Cancer Father         bone    Diabetes Sister     Asthma Brother        Vitals:  /63   Pulse 70   Temp 98 °F (36.7 °C) (Oral)   Resp 18   Ht 5' 10\" (1.778 m)   Wt 208 lb 1.8 oz (94.4 kg)   SpO2 98%   PF (!) 21 L/min   BMI 29.86 kg/m²   Temp (24hrs), Av °F (36.7 °C), Min:97.7 °F (36.5 °C), Max:98.2 °F (36.8 °C)    No results for input(s): POCGLU in the last 72 hours. I/O (24Hr): Intake/Output Summary (Last 24 hours) at 9/15/2022 1430  Last data filed at 9/15/2022 0446  Gross per 24 hour   Intake 1539.89 ml   Output --   Net 1539.89 ml         Labs:  Hematology:  Recent Labs     22  1457 22  2301 22  0819 22  0756 22  1256 09/15/22  0104 09/15/22  0654 09/15/22  1236   WBC 6.4 6.2  --   --   --   --   --   --    RBC 2.65* 2.26*  --   --   --   --   --   --    HGB 6.6* 5.6*   < > 7.8*   < > 7.6* 7.8* 8.3*   HCT 22.0* 18.6*   < > 25.8*   < > 25.3* 25.0* 27.4*   MCV 82.8 82.2  --   --   --   --   --   --    MCH 24.9* 24.8*  --   --   --   --   --   --    MCHC 30.1* 30.1*  --   --   --   --   --   --    RDW 19.2* 19.6*  --   --   --   --   --   --     191  --   --   --   --   --   --    MPV 6.1 6.8  --   --   --   --   --   --    INR  --  1.5  --  1.4  --   --   --   --     < > = values in this interval not displayed.        Chemistry:  Recent Labs     22  2301 22  0756 09/15/22  0654   * 132* 132*   K 4.2 4.3 4.1   CL 94* 105 107   CO2 19* 21 20   GLUCOSE 112* 127* 126*   BUN 13 9 6*   CREATININE 0.70 0.68* 0.61*   MG 1.5*  --   --    ANIONGAP 10 6* 5*   LABGLOM >60 >60 >60   GFRAA >60 >60 >60   CALCIUM 8.2* 7.8* 7.9*   PROBNP 787*  --   --    TROPHS 22  --   --        Recent Labs     09/12/22  1457 09/12/22  2301   PROT 5.4* 4.9*   LABALBU 3.2* 3.0*   TSH  --  3.05   AST 32 27   ALT 16 13   ALKPHOS 134* 119   BILITOT 1.8* 1.6*   AMMONIA  --  44   LIPASE  --  23       ABG:No results found for: POCPH, PHART, PH, POCPCO2, XZP6NLA, PCO2, POCPO2, PO2ART, PO2, POCHCO3, CJM3ZXN, HCO3, NBEA, PBEA, BEART, BE, THGBART, THB, OJP4KKS, HPSS1TYX, D3XIROVN, O2SAT, FIO2  Lab Results   Component Value Date/Time    SPECIAL NOT REPORTED 02/02/2022 11:28 AM     Lab Results   Component Value Date/Time    CULTURE NO SIGNIFICANT GROWTH 09/12/2022 11:00 PM       Radiology:  XR CHEST PORTABLE    Result Date: 9/12/2022  Unremarkable portable chest radiograph. IR US GUIDED PARACENTESIS    Result Date: 9/9/2022  Successful ultrasound-guided paracentesis. Physical Examination:        General appearance:  alert, cooperative and no distress  Mental Status:  oriented to person, place and time and normal affect  Lungs:  clear to auscultation bilaterally, normal effort  Heart:  regular rate and rhythm, no murmur  Abdomen:  soft, nontender, nondistended, normal bowel sounds, no masses, hepatomegaly, splenomegaly  Extremities:  no edema, redness, tenderness in the calves  Skin:  no gross lesions, rashes, induration    Assessment:        Hospital Problems             Last Modified POA    * (Principal) GI bleed 9/13/2022 Yes    Secondary esophageal varices ([address] 9/13/2022 Yes    Chronic viral hepatitis B without delta agent and without coma ([address] 9/13/2022 Yes    Malignant neoplasm of lower third of esophagus ([address] 9/13/2022 Yes    Anemia 9/13/2022 Yes     Plan:         Active GI bleed, melanotic stools, severe anemia due to blood loss, s/p transfusion, EGD today   Hyponatremia   Severe alcoholism with advanced cirrhosis and possible esophageal varices  Urine tract infection, started on Rocephin,  hepatitis C, continue home medications, ammonia level 44, will hold Lasix and spironolactone as blood pressure was running low      September 15,  Active GI bleed, EGD today,  Hyponatremia, due to underlying cirrhosis,  Possible esophageal varices,  UTI, on Rocephin,  Hepatitis C,  Chronic pain,  Abdominal ascites,   IR to drain  Discharge tomorrow morning      Fabiola Lopez MD  9/15/2022  2:30 PM     Please note that this chart was generated using voice recognition Dragon dictation software. Although every effort was made to ensure the accuracy of this automated transcription, some errors in transcription may have occurred.

## 2022-09-15 NOTE — PROGRESS NOTES
Patient tolerated procedure well without distress. 9.3 L of clear, yellow fluid removed. Area cleansed & dry dressing applied. Transport notified to take patient back to Rm.  [id number] Tai updated.

## 2022-09-15 NOTE — PLAN OF CARE
Problem: Discharge Planning  Goal: Discharge to home or other facility with appropriate resources  9/15/2022 1536 by Dallin Rodriguez RN  Outcome: Progressing     Problem: Pain  Goal: Verbalizes/displays adequate comfort level or baseline comfort level  9/15/2022 1536 by Dallin Rodriguez RN  Outcome: Progressing     Problem: Safety - Adult  Goal: Free from fall injury  9/15/2022 1536 by Dallin Rodriguez RN  Outcome: Progressing     Problem: ABCDS Injury Assessment  Goal: Absence of physical injury  9/15/2022 1536 by Dallin Rodriguez RN  Outcome: Progressing

## 2022-09-16 VITALS
TEMPERATURE: 97.9 F | HEART RATE: 68 BPM | DIASTOLIC BLOOD PRESSURE: 88 MMHG | HEIGHT: 70 IN | SYSTOLIC BLOOD PRESSURE: 106 MMHG | WEIGHT: 199.96 LBS | BODY MASS INDEX: 28.63 KG/M2 | OXYGEN SATURATION: 93 % | RESPIRATION RATE: 17 BRPM

## 2022-09-16 LAB
A1 LECTIN: NEGATIVE
A1 LECTIN: NEGATIVE
ABO/RH: NORMAL
ABO/RH: NORMAL
ANION GAP SERPL CALCULATED.3IONS-SCNC: 6 MMOL/L (ref 9–17)
ANTIBODY SCREEN: NEGATIVE
ANTIBODY SCREEN: NEGATIVE
ARM BAND NUMBER: NORMAL
ARM BAND NUMBER: NORMAL
BLD PROD TYP BPU: NORMAL
BLOOD BANK BLOOD PRODUCT EXPIRATION DATE: NORMAL
BLOOD BANK ISBT PRODUCT BLOOD TYPE: 5100
BLOOD BANK ISBT PRODUCT BLOOD TYPE: 5100
BLOOD BANK ISBT PRODUCT BLOOD TYPE: 7300
BLOOD BANK PRODUCT CODE: NORMAL
BLOOD BANK UNIT TYPE AND RH: NORMAL
BPU ID: NORMAL
BUN BLDV-MCNC: 4 MG/DL (ref 8–23)
CALCIUM SERPL-MCNC: 8 MG/DL (ref 8.6–10.4)
CHLORIDE BLD-SCNC: 107 MMOL/L (ref 98–107)
CO2: 23 MMOL/L (ref 20–31)
CREAT SERPL-MCNC: 0.48 MG/DL (ref 0.7–1.2)
CROSSMATCH RESULT: NORMAL
DISPENSE STATUS BLOOD BANK: NORMAL
EXPIRATION DATE: NORMAL
EXPIRATION DATE: NORMAL
GFR AFRICAN AMERICAN: >60 ML/MIN
GFR NON-AFRICAN AMERICAN: >60 ML/MIN
GFR SERPL CREATININE-BSD FRML MDRD: ABNORMAL ML/MIN/{1.73_M2}
GLUCOSE BLD-MCNC: 97 MG/DL (ref 70–99)
HCT VFR BLD CALC: 26.2 % (ref 41–53)
HCT VFR BLD CALC: 27.4 % (ref 41–53)
HCT VFR BLD CALC: 29.1 % (ref 41–53)
HEMOGLOBIN: 7.9 G/DL (ref 13.5–17.5)
HEMOGLOBIN: 8.4 G/DL (ref 13.5–17.5)
HEMOGLOBIN: 9 G/DL (ref 13.5–17.5)
POTASSIUM SERPL-SCNC: 3.9 MMOL/L (ref 3.7–5.3)
SODIUM BLD-SCNC: 136 MMOL/L (ref 135–144)
TRANSFUSION STATUS: NORMAL
UNIT DIVISION: 0
UNIT ISSUE DATE/TIME: NORMAL

## 2022-09-16 PROCEDURE — 6360000002 HC RX W HCPCS: Performed by: INTERNAL MEDICINE

## 2022-09-16 PROCEDURE — 85018 HEMOGLOBIN: CPT

## 2022-09-16 PROCEDURE — 36415 COLL VENOUS BLD VENIPUNCTURE: CPT

## 2022-09-16 PROCEDURE — 2580000003 HC RX 258: Performed by: INTERNAL MEDICINE

## 2022-09-16 PROCEDURE — C9113 INJ PANTOPRAZOLE SODIUM, VIA: HCPCS | Performed by: INTERNAL MEDICINE

## 2022-09-16 PROCEDURE — 6370000000 HC RX 637 (ALT 250 FOR IP): Performed by: INTERNAL MEDICINE

## 2022-09-16 PROCEDURE — 80048 BASIC METABOLIC PNL TOTAL CA: CPT

## 2022-09-16 PROCEDURE — A4216 STERILE WATER/SALINE, 10 ML: HCPCS | Performed by: INTERNAL MEDICINE

## 2022-09-16 PROCEDURE — 85014 HEMATOCRIT: CPT

## 2022-09-16 PROCEDURE — 99239 HOSP IP/OBS DSCHRG MGMT >30: CPT | Performed by: INTERNAL MEDICINE

## 2022-09-16 RX ORDER — HEPARIN SODIUM (PORCINE) LOCK FLUSH IV SOLN 100 UNIT/ML 100 UNIT/ML
300 SOLUTION INTRAVENOUS PRN
Status: DISCONTINUED | OUTPATIENT
Start: 2022-09-16 | End: 2022-09-16 | Stop reason: HOSPADM

## 2022-09-16 RX ADMIN — SODIUM CHLORIDE 40 MG: 9 INJECTION INTRAMUSCULAR; INTRAVENOUS; SUBCUTANEOUS at 08:38

## 2022-09-16 RX ADMIN — MIDODRINE HYDROCHLORIDE 10 MG: 10 TABLET ORAL at 17:42

## 2022-09-16 RX ADMIN — MIDODRINE HYDROCHLORIDE 10 MG: 10 TABLET ORAL at 14:19

## 2022-09-16 RX ADMIN — SPIRONOLACTONE 100 MG: 25 TABLET ORAL at 14:19

## 2022-09-16 RX ADMIN — HEPARIN 300 UNITS: 100 SYRINGE at 18:34

## 2022-09-16 RX ADMIN — OXYCODONE AND ACETAMINOPHEN 1 TABLET: 5; 325 TABLET ORAL at 17:44

## 2022-09-16 RX ADMIN — FUROSEMIDE 20 MG: 20 TABLET ORAL at 08:37

## 2022-09-16 RX ADMIN — MIDODRINE HYDROCHLORIDE 10 MG: 10 TABLET ORAL at 08:38

## 2022-09-16 RX ADMIN — CEFTRIAXONE SODIUM 1000 MG: 1 INJECTION, POWDER, FOR SOLUTION INTRAMUSCULAR; INTRAVENOUS at 00:42

## 2022-09-16 RX ADMIN — NADOLOL 20 MG: 20 TABLET ORAL at 12:06

## 2022-09-16 RX ADMIN — SPIRONOLACTONE 100 MG: 25 TABLET ORAL at 08:37

## 2022-09-16 RX ADMIN — FERROUS SULFATE TAB 325 MG (65 MG ELEMENTAL FE) 325 MG: 325 (65 FE) TAB at 08:37

## 2022-09-16 RX ADMIN — SODIUM CHLORIDE: 9 INJECTION, SOLUTION INTRAVENOUS at 05:07

## 2022-09-16 RX ADMIN — OXYCODONE AND ACETAMINOPHEN 1 TABLET: 5; 325 TABLET ORAL at 08:38

## 2022-09-16 RX ADMIN — FLUOXETINE 20 MG: 20 CAPSULE ORAL at 08:37

## 2022-09-16 ASSESSMENT — PAIN SCALES - GENERAL
PAINLEVEL_OUTOF10: 7
PAINLEVEL_OUTOF10: 0
PAINLEVEL_OUTOF10: 7

## 2022-09-16 NOTE — PROGRESS NOTES
Lab was contacted in regards to the stat order which Laila Peña acknowledged seeing and stated he would let the tech know.

## 2022-09-16 NOTE — TELEPHONE ENCOUNTER
Vickki Tucker, APRN - CNP  P [facility] Procedure Schedulers  Need egd with dr Pacheco Smith 4 weeks . MASSIMO Cloud - CNP

## 2022-09-16 NOTE — CARE COORDINATION
ONGOING DISCHARGE PLAN:    Patient is alert and oriented x4. Spoke with patient regarding discharge plan and patient confirms that plan is still to go home with [facility]. IV rocephin , PO lasix 20 mg BID. HGB 8.4 today after blood transfusion overnight s/p EGD 9/14.     9/15 IR paracentesis 9.3 L fluid removed. Will continue to follow for additional discharge needs.     Electronically signed by Angelo Davies RN on 9/16/2022 at 1:19 PM

## 2022-09-16 NOTE — PROGRESS NOTES
Patient's post transfusion lab has still not resulted. Writer called lab to ask about it and Merlyn Pittman stated the order was not placed. Writer called house supervisor who checked and stated the lab states it has not resulted hence writer was advised by house supervisor to placed a stat order.  Would follow up with lab

## 2022-09-16 NOTE — DISCHARGE INSTR - DIET

## 2022-09-16 NOTE — ANESTHESIA POSTPROCEDURE EVALUATION
Department of Anesthesiology  Postprocedure Note    Patient: Julio Hendrix  MRN: [id number]  YOB: 1960  Date of evaluation: 9/16/2022      Procedure Summary     Date: 09/14/22 Room / Location: [place] [address] 01 / [facility]    Anesthesia Start: 8934 Anesthesia Stop: 7688    Procedure: EGD ESOPHAGOGASTRODUODENOSCOPY ENDOSCOPIC APC AT [facility] 39 (Esophagus) Diagnosis:       Melena      (MELENA)    Surgeons: Harjeet Addison MD Responsible Provider: Travon Dugan MD    Anesthesia Type: General ASA Status: 3          Anesthesia Type: General    Yen Phase I: Yen Score: 9    Yen Phase II:        Anesthesia Post Evaluation    Comments: POD# 2. Patient seen fast asleep. No anesthesia related issues reported per floor staff.

## 2022-09-16 NOTE — DISCHARGE SUMMARY
[address] Internal Medicine  Jorje Dale MD; Sharon Bethea MD; Jenny Lei MD; MD Maryjane Lim Res, MD; Adeline Goldmann, MD      CHANDNI MACHUCA [facility] Internal Medicine  [facility] [address]   [address]    Discharge Summary     Patient ID: Sophia Epps  :     MRN: [id number]     ACCOUNT:  [de-identified]   Patient's PCP: VASU Rocha  Admit Date: 2022   Discharge Date: 2022     Length of Stay: 3  Code Status:  Full Code  Admitting Physician: Justus Mcmillan MD  Discharge Physician: Adeline Goldmann, MD     Active Discharge Diagnoses:     Hospital Problem Lists:  Principal Problem:    GI bleed  Active Problems:    Secondary esophageal varices ([facility] [address]    Chronic viral hepatitis B without delta agent and without coma ([facility] [address]    Malignant neoplasm of lower third of esophagus ([facility] [address]    Anemia  Resolved Problems:    * No resolved hospital problems. *      Admission Condition:  critical     Discharged Condition: stable    Hospital Stay:     Hospital Course:  Sophia Epps is a 60 y.o. male who was admitted for the management of   GI bleed , presented to ER with Shortness of Breath and Dizziness    Sophia Epps is a 60 y.o. Non- / non  male who presents with Shortness of Breath and Dizziness with a history of alcoholic cirrhosis and hepatitis B and is admitted to the hospital for the management of GI bleed. According to patient, he has been having lightheadedness and shortness of breath with exertion over the past week. Additionally, reports having dark stools over the past week, sating that he has several episodes of Diarrhea, which is currently resolved. Symptoms are associated with lightheadedness low blood count. Patient reports having routine blood work earlier today with hemoglobin 6.6. Reports that he received a telephone call from the gastroenterology office instructing him to come to the ED tonight for transfusion. 09:35 AM    CALCIUM 7.9 09/15/2022 06:54 AM    LABGLOM >60 09/15/2022 06:54 AM    GFRAA >60 09/15/2022 06:54 AM    GFR      09/15/2022 06:54 AM     HFP:    Lab Results   Component Value Date/Time    PROT 4.9 09/12/2022 11:01 PM     CMP:    Lab Results   Component Value Date/Time    GLUCOSE 126 09/15/2022 06:54 AM    GLUCOSE 65 04/19/2012 11:30 AM     09/15/2022 06:54 AM    K 4.1 09/15/2022 06:54 AM     09/15/2022 06:54 AM    CO2 20 09/15/2022 06:54 AM    BUN 6 09/15/2022 06:54 AM    CREATININE 0.61 09/15/2022 06:54 AM    ANIONGAP 5 09/15/2022 06:54 AM    ALKPHOS 119 09/12/2022 11:01 PM    ALT 13 09/12/2022 11:01 PM    AST 27 09/12/2022 11:01 PM    BILITOT 1.6 09/12/2022 11:01 PM    LABALBU 3.0 09/12/2022 11:01 PM    LABALBU 4.7 04/19/2012 11:30 AM    ALBUMIN 1.3 08/29/2022 02:48 PM    LABGLOM >60 09/15/2022 06:54 AM    GFRAA >60 09/15/2022 06:54 AM    GFR      09/15/2022 06:54 AM    PROT 4.9 09/12/2022 11:01 PM    CALCIUM 7.9 09/15/2022 06:54 AM     PT/INR:    Lab Results   Component Value Date/Time    PROTIME 16.7 09/14/2022 07:56 AM    INR 1.4 09/14/2022 07:56 AM     PTT:   Lab Results   Component Value Date/Time    APTT 31.4 09/14/2022 07:56 AM     FLP:    Lab Results   Component Value Date/Time    CHOL 227 02/02/2019 01:20 PM    TRIG 102 02/02/2019 01:20 PM    HDL 99 08/19/2021 01:29 PM     U/A:    Lab Results   Component Value Date/Time    COLORU [place] 09/12/2022 11:03 PM    TURBIDITY Clear 09/12/2022 11:03 PM    SPECGRAV 1.016 09/12/2022 11:03 PM    HGBUR NEGATIVE 09/12/2022 11:03 PM    PHUR 5.5 09/12/2022 11:03 PM    PROTEINU NEGATIVE 09/12/2022 11:03 PM    GLUCOSEU NEGATIVE 09/12/2022 11:03 PM    GLUCOSEU NEGATIVE 04/19/2012 11:30 AM    KETUA TRACE 09/12/2022 11:03 PM    BILIRUBINUR SMALL 09/12/2022 11:03 PM    BILIRUBINUR - 05/05/2017 11:34 AM    BILIRUBINUR NEGATIVE 04/19/2012 11:30 AM    UROBILINOGEN Normal 09/12/2022 11:03 PM    NITRU NEGATIVE 09/12/2022 11:03 PM    LEUKOCYTESUR TRACE 09/12/2022 11:03 PM     TSH:    Lab Results   Component Value Date/Time    TSH 3.05 09/12/2022 11:01 PM          Radiology:  XR CHEST PORTABLE    Result Date: 9/12/2022  Unremarkable portable chest radiograph. IR US GUIDED PARACENTESIS    Result Date: 9/9/2022  Successful ultrasound-guided paracentesis. Consultations:    Consults:     Final Specialist Recommendations/Findings:   IP CONSULT TO INTERNAL MEDICINE  IP CONSULT TO GI      The patient was seen and examined on day of discharge and this discharge summary is in conjunction with any daily progress note from day of discharge. Discharge plan:     Disposition: Home    Physician Follow Up:   VASU Verduzco  In 5 days   [address] [place] [id number]  [phone]        [address] [address]  [phone]    Schedule an appointment as soon as possible for a visit in 4 week(s)  egd       Requiring Further Evaluation/Follow Up POST HOSPITALIZATION/Incidental Findings:     Diet: cardiac diet    Activity: As tolerated    Instructions to Patient:     Discharge Medications:      Medication List        CONTINUE taking these medications      atorvastatin 20 MG tablet  Commonly known as: LIPITOR  Take 1 tablet by mouth nightly     ferrous sulfate 325 (65 Fe) MG tablet  Commonly known as: IRON 325  Take 1 tablet by mouth 2 times daily     FLUoxetine 20 MG capsule  Commonly known as: PROZAC  Take 1 capsule by mouth daily     furosemide 20 MG tablet  Commonly known as: Lasix  Take 1 tablet by mouth 2 times daily     lactulose 10 GM/15ML solution  Commonly known as: CHRONULAC  take 30 milliliters by mouth three times a day     midodrine 5 MG tablet  Commonly known as: PROAMATINE  Take 2 tablets by mouth in the morning and 2 tablets at noon and 2 tablets before bedtime.      nadolol 20 MG tablet  Commonly known as: CORGARD  take 1 tablet by mouth once daily     ondansetron 4 MG

## 2022-09-16 NOTE — PLAN OF CARE
Problem: Discharge Planning  Goal: Discharge to home or other facility with appropriate resources  9/16/2022 1748 by Nighat Antunez RN  Outcome: Completed     Problem: Pain  Goal: Verbalizes/displays adequate comfort level or baseline comfort level  9/16/2022 1748 by Nighat Antunez RN  Outcome: Completed     Problem: Safety - Adult  Goal: Free from fall injury  9/16/2022 1748 by Nighat Antunez RN  Outcome: Completed     Problem: ABCDS Injury Assessment  Goal: Absence of physical injury  9/16/2022 1748 by Nighat Antunez RN  Outcome: Completed

## 2022-09-16 NOTE — PLAN OF CARE
Problem: Discharge Planning  Goal: Discharge to home or other facility with appropriate resources  9/16/2022 0351 by Blessing Tripp RN  Outcome: Progressing     Problem: Pain  Goal: Verbalizes/displays adequate comfort level or baseline comfort level  9/16/2022 0351 by Blessing Tripp RN  Outcome: Progressing     Problem: Safety - Adult  Goal: Free from fall injury  9/16/2022 0351 by Blessing Tripp RN  Outcome: Progressing     Problem: ABCDS Injury Assessment  Goal: Absence of physical injury  9/16/2022 0351 by Blessing Tripp RN  Outcome: Progressing

## 2022-09-19 ENCOUNTER — HOSPITAL ENCOUNTER (OUTPATIENT)
Age: 62
Discharge: HOME | End: 2022-09-19
Payer: MEDICARE

## 2022-09-19 DIAGNOSIS — K70.31 ASCITES DUE TO ALCOHOLIC CIRRHOSIS (HCC): ICD-10-CM

## 2022-09-19 LAB
ABSOLUTE EOS #: 0.07 K/UL (ref 0–0.4)
ABSOLUTE LYMPH #: 0.26 K/UL (ref 1–4.8)
ABSOLUTE MONO #: 0.79 K/UL (ref 0.1–1.3)
ALBUMIN SERPL-MCNC: 3.2 G/DL (ref 3.5–5.2)
ALP BLD-CCNC: 159 U/L (ref 40–129)
ALT SERPL-CCNC: 14 U/L (ref 5–41)
ANION GAP SERPL CALCULATED.3IONS-SCNC: 8 MMOL/L (ref 9–17)
AST SERPL-CCNC: 37 U/L
BASOPHILS # BLD: 1 % (ref 0–2)
BASOPHILS ABSOLUTE: 0.07 K/UL (ref 0–0.2)
BILIRUB SERPL-MCNC: 1.7 MG/DL (ref 0.3–1.2)
BUN BLDV-MCNC: 9 MG/DL (ref 8–23)
CALCIUM SERPL-MCNC: 8.4 MG/DL (ref 8.6–10.4)
CHLORIDE BLD-SCNC: 101 MMOL/L (ref 98–107)
CO2: 24 MMOL/L (ref 20–31)
CREAT SERPL-MCNC: 0.69 MG/DL (ref 0.7–1.2)
EOSINOPHILS RELATIVE PERCENT: 1 % (ref 0–4)
GFR AFRICAN AMERICAN: >60 ML/MIN
GFR NON-AFRICAN AMERICAN: >60 ML/MIN
GFR SERPL CREATININE-BSD FRML MDRD: ABNORMAL ML/MIN/{1.73_M2}
GLUCOSE BLD-MCNC: 96 MG/DL (ref 70–99)
HCT VFR BLD CALC: 31.5 % (ref 41–53)
HEMOGLOBIN: 10.1 G/DL (ref 13.5–17.5)
LYMPHOCYTES # BLD: 4 % (ref 24–44)
MCH RBC QN AUTO: 27.1 PG (ref 26–34)
MCHC RBC AUTO-ENTMCNC: 32.1 G/DL (ref 31–37)
MCV RBC AUTO: 84.4 FL (ref 80–100)
MONOCYTES # BLD: 12 % (ref 1–7)
MORPHOLOGY: ABNORMAL
MORPHOLOGY: ABNORMAL
PDW BLD-RTO: 17.8 % (ref 11.5–14.9)
PLATELET # BLD: 159 K/UL (ref 150–450)
PMV BLD AUTO: 6.9 FL (ref 6–12)
POTASSIUM SERPL-SCNC: 3.9 MMOL/L (ref 3.7–5.3)
RBC # BLD: 3.73 M/UL (ref 4.5–5.9)
SEG NEUTROPHILS: 82 % (ref 36–66)
SEGMENTED NEUTROPHILS ABSOLUTE COUNT: 5.41 K/UL (ref 1.3–9.1)
SODIUM BLD-SCNC: 133 MMOL/L (ref 135–144)
TOTAL PROTEIN: 5.5 G/DL (ref 6.4–8.3)
WBC # BLD: 6.6 K/UL (ref 3.5–11)

## 2022-09-19 PROCEDURE — 85025 COMPLETE CBC W/AUTO DIFF WBC: CPT

## 2022-09-19 PROCEDURE — 80053 COMPREHEN METABOLIC PANEL: CPT

## 2022-09-19 PROCEDURE — 36415 COLL VENOUS BLD VENIPUNCTURE: CPT

## 2022-09-22 ENCOUNTER — OFFICE VISIT (OUTPATIENT)
Dept: ONCOLOGY | Age: 62
End: 2022-09-22
Payer: MEDICARE

## 2022-09-22 ENCOUNTER — TELEPHONE (OUTPATIENT)
Dept: ONCOLOGY | Age: 62
End: 2022-09-22

## 2022-09-22 ENCOUNTER — HOSPITAL ENCOUNTER (OUTPATIENT)
Dept: INFUSION THERAPY | Age: 62
Discharge: HOME | End: 2022-09-22

## 2022-09-22 VITALS
TEMPERATURE: 98 F | WEIGHT: 196.5 LBS | BODY MASS INDEX: 28.19 KG/M2 | SYSTOLIC BLOOD PRESSURE: 119 MMHG | HEART RATE: 77 BPM | DIASTOLIC BLOOD PRESSURE: 80 MMHG

## 2022-09-22 DIAGNOSIS — C15.5 MALIGNANT NEOPLASM OF LOWER THIRD OF ESOPHAGUS (HCC): Primary | ICD-10-CM

## 2022-09-22 DIAGNOSIS — R97.8 OTHER ABNORMAL TUMOR MARKERS: ICD-10-CM

## 2022-09-22 PROCEDURE — 99214 OFFICE O/P EST MOD 30 MIN: CPT | Performed by: INTERNAL MEDICINE

## 2022-09-22 PROCEDURE — 99211 OFF/OP EST MAY X REQ PHY/QHP: CPT | Performed by: INTERNAL MEDICINE

## 2022-09-22 NOTE — TELEPHONE ENCOUNTER
AVS from 9/22/22      RTC in 6-8 weeks with labs and scan    Rv scheduled for 11/8 @ 11:45 am     Ct scheduled for 11/3  @ 3:00 pm      Pt was given AVS and appointment schedule    Electronically signed by Otilio Severs on 9/22/2022 at 2:36 PM

## 2022-09-22 NOTE — PROGRESS NOTES
Patient ID: Fernando Orozco, [birth year], [id number], 60 y.o. Referred by : Dr Karely Bynum  Reason for consultation:   Esophageal cancer T2N0Mx  Stage II   started on concurrent chemoradiation preoperatively on 5/4/21  Chemoradiation completed 6/9/21  Patient had a PET scan on 7/23/2021 which showed no evidence of recurrence  Patient has been evaluated by thoracic surgeon at [facility] Dr. Shantanu Wilder and also hepatologist Dr. Georgina Ceballos his case was discussed at thoracic tumor oncology board with recommendations NOT to do any surgery because of his liver failure. So surveillance recommended  He had an upper endoscopy on 8/31 which showed no residual cancer but showed Lezama's esophagus with high-grade dysplasia. HISTORY OF PRESENT ILLNESS:    Oncologic History:  Fernando Orozco is a 60 y.o. male with a history of hepatitis C, status post treatment, liver cirrhosis, history of alcohol abuse was seen during initial consultation visit for newly diagnosed esophageal cancer. Patient reportedly had \"heavy drinking alcohol in about 2016 however recently in December he had 2 beers and he presented with hematemesis. On 12/29/2020 he had upper endoscopy which showed severe portal hypertension, gastropathy. The biopsy from the GE junction showed invasive adenocarcinoma. Patient had a follow-up EGD on 2/2/2021 which confirmed esophageal adenocarcinoma. Patient had endoscopic ultrasound on 2/12/2021 which showed T2 N0 MX disease with underlying esophageal varices. In the esophagus hypoechoic lesion noted in the lower esophagus penetrating into submucosa and into muscularis propria. No lymphadenopathy noted. Patient was referred to medical oncology for further recommendations. He denies any difficulty swallowing. He does not smoke he has quit smoking in 2016. He has not drank alcohol since December. He has a history of hepatitis C and he has received treatment. He lives by himself.   He is accompanied by his ex-wife during today's office visit. INTERVAL HISTORY:  Patient is returning for a follow-up visit and to discuss lab results, CT scan results and further recommendations. His scan showed no evidence of recurrence. He denies any abdominal pain. He denies any nausea matting. He recently had bleeding from esophageal varices and hemoglobin dropped to 6.9 he was evaluated by gastroenterology. He is planning to have follow-up endoscopy in October. He is getting paracentesis every Friday. He is following with gastroenterologist for his liver cirrhosis. Recent lab work showed hemoglobin improved to 10. During this visit patient's allergy, social, medical, surgical history and medications were reviewed and updated.     Past Medical History:   Diagnosis Date    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 ([address]     Alcoholic cirrhosis of liver with ascites ([address]     Anemia 04/13/2022    Anxiety     Arthritis     Back pain, chronic     dr. Joanna Frank, orthopedic, every 3-4 months, gets steroid injection    Lezama esophagus     BPH (benign prostatic hypertrophy)     Cholelithiasis     Cirrhosis ([address]     COVID-19 12/2020    pt reports he had a positive test while at [facility] in 2020, was asymptomatic    COVID-19 vaccine series completed 5/20/2021, 6/22/2021    Moderna 5/20/2021, 6/22/2021    DDD (degenerative disc disease), lumbar     Depression     Esophageal cancer ([address]     INVASIVE ADENOCARCINOMA ARISING IN TUBULAR ADENOMA WITH HIGH GRADE DYSPLASIA, ASSOCIATED WITH FOCAL INTESTINAL METAPLASIA     Esophageal varices ([address]     Fatty liver     GERD (gastroesophageal reflux disease)     Hep C w/o coma, chronic ([address]     History of alcohol abuse     6-12 beers a day; quit drinking 2019    History of blood transfusion     History of colon polyps 2016    History of tobacco abuse     [place] (hard of hearing)     Hyperlipidemia     Hypertension     Hyponatremia 07/20/2016    Port-A-Cath in place     right upper chest Portal hypertension (HCC)     Sciatica     Secondary esophageal varices (HCC) 06/07/2022    Shortness of breath     Spinal stenosis     Stomach ulcer     hx of    Tubular adenoma of colon 2016, 2018    Vitamin D deficiency     Wears glasses        Past Surgical History:   Procedure Laterality Date    BUNIONECTOMY      twice on right side    BUNIONECTOMY Left     CARPAL TUNNEL RELEASE Right     COLONOSCOPY      at age 35    COLONOSCOPY  10/05/2016    polyps-pathology tubular adenoma, and abnormal looking mucosa right colon-pathology-tubular adenoma    COLONOSCOPY N/A 03/30/2018    COLONOSCOPY POLYPECTOMY COLD BIOPSY performed by Nancy Reid MD at [facility]  03/30/2018    Small polyp in the sigmoid colon and excised with biopsy forceps--tubular adenoma    COLONOSCOPY N/A 04/16/2022    COLONOSCOPY POLYPECTOMY performed by Nancy Reid MD at [address], COLON, DIAGNOSTIC      EGD    IR PORT PLACEMENT EQUAL OR GREATER THAN 5 YEARS  04/19/2021    IR PORT PLACEMENT EQUAL OR GREATER THAN 5 YEARS 4/19/2021 [facility] SPECIAL PROCEDURES    KNEE SURGERY Left     cyst removed    NASAL SEPTUM SURGERY      NERVE BLOCK Right 11/23/2020    NERVE BLOCK RIGHT CERVICAL STEROID INJECTION  C3-C6 performed by Sony Rai MD at [facility]  01/04/2016    steroid injection C7 T1    OTHER SURGICAL HISTORY  11/21/2016    Bilateral Lumbar CACHORRO L4-L5 injections    OTHER SURGICAL HISTORY  12/19/2016    lumbar steroid injection    OTHER SURGICAL HISTORY  09/28/2018    BILATERAL L5 CACHORRO (N/A Back)    OTHER SURGICAL HISTORY Right 11/23/2020    cervical injection    PAIN MANAGEMENT PROCEDURE Left 07/09/2020    EPIDURAL STEROID INJECTION LEFT L4 L5 performed by Sony Rai MD at [facility] Left 07/20/2020    LEFT L4 L5 EPIDURAL STEROID INJECTION performed by Sony Rai MD at [facility] Bilateral 08/17/2020    LUMBAR FACET BILATERAL L2-L5 performed by Paola Tom MD at [facility] Bilateral 12/07/2020    NERVE BLOCK BILATERAL LUMBAR MEDIAL BRANCH L2-L5 performed by Paola Tom MD at [address] [place] [address] [place] [address] AA&/STRD TFRML EPI LUMBAR/SACRAL 1 LEVEL Bilateral 09/06/2018    BILATERAL L5 CACHORRO performed by Paola Tom MD at [facility] AA&/STRD TFRML EPI LUMBAR/SACRAL 1 LEVEL N/A 09/28/2018    BILATERAL L5 CACHORRO performed by Paola Tom MD at [address] [place] [address] N/CARPAL TUNNEL SURG Right 08/29/2017    CARPAL TUNNEL RELEASE RIGHT performed by Bonilla Holden MD at [address] [place] [address]/CARPAL TUNNEL SURG Left 10/31/2017    CARPAL TUNNEL RELEASE performed by Bonilla Holden MD at [address] [place] [address] 12/29/2020    EGD BIOPSY performed by Dani Quigley MD at [address] [place] [address] 02/02/2021    EGD BIOPSY and spot marking performed by Raya Fischer MD at [address] [place] [address] N/A 02/12/2021    ENDOSCOPIC ULTRASOUND, EGD performed by Delmy Pereyra MD at [address] [place] [address]  02/12/2021    EGD DIAGNOSTIC ONLY performed by Delmy Pereyra MD at [address] [place] [address] 08/31/2021    EGD BIOPSY performed by Raya Fischer MD at [address] [place] [address] 01/21/2022    EGD BIOPSY performed by Raya Fischer MD at [address] [place] [address] 04/15/2022    EGD ESOPHAGOGASTRODUODENOSCOPY performed by Dani Quigley MD at [address] [place] [address] 06/06/2022    EGD BAND LIGATION performed by Lupe Priest MD at [address] [place] [address] N/A 06/09/2022    EGD ESOPHAGOGASTRODUODENOSCOPY performed by Lupe Priest MD at [address] [place] [address] 9/14/2022    EGD ESOPHAGOGASTRODUODENOSCOPY ENDOSCOPIC APC AT [place] [address] performed by Peter Barros MD at [address] [place] [address]       No Known Allergies      Current Outpatient Medications   Medication Sig Dispense Refill    nadolol (CORGARD) 20 MG tablet take 1 tablet by mouth once daily 30 tablet 2    midodrine (PROAMATINE) 5 MG tablet Take 2 tablets by mouth in the morning and 2 tablets at noon and 2 tablets before bedtime. 90 tablet 3    furosemide (LASIX) 20 MG tablet Take 1 tablet by mouth 2 times daily 60 tablet 3    spironolactone (ALDACTONE) 100 MG tablet Take 1 tablet by mouth every evening (Patient taking differently: Take 100 mg by mouth 3 times daily) 30 tablet 1    ondansetron (ZOFRAN-ODT) 4 MG disintegrating tablet dissolve 1 tablet by mouth every 8 hours if needed for nausea and vomiting 10 tablet 0    lactulose (CHRONULAC) 10 GM/15ML solution take 30 milliliters by mouth three times a day 473 mL 1    FLUoxetine (PROZAC) 20 MG capsule Take 1 capsule by mouth daily 30 capsule 3    atorvastatin (LIPITOR) 20 MG tablet Take 1 tablet by mouth nightly 30 tablet 3    ferrous sulfate (IRON 325) 325 (65 Fe) MG tablet Take 1 tablet by mouth 2 times daily 60 tablet 5     No current facility-administered medications for this visit.        Social History     Socioeconomic History    Marital status: Single     Spouse name: Not on file    Number of children: Not on file    Years of education: Not on file    Highest education level: Not on file   Occupational History    Not on file   Tobacco Use    Smoking status: Former     Packs/day: 1.00     Years: 45.00     Pack years: 45.00     Types: Cigarettes     Quit date: 2017     Years since quittin.6    Smokeless tobacco: Never   Vaping Use    Vaping Use: Never used   Substance and Sexual Activity    Alcohol use: Not Currently     Comment: Quit alcohol in 2019- heavier drinking prior to quitting    Drug use: Not Currently     Frequency: 1.0 times per week     Types: Cocaine     Comment: Cocaine- stopped spring 2016 Sexual activity: Yes     Partners: Female   Other Topics Concern    Not on file   Social History Narrative     in the past, retired     Social Determinants of Health     Financial Resource Strain: Low Risk     Difficulty of Paying Living Expenses: Not hard at all   Food Insecurity: No Food Insecurity    Worried About [address] in the Last Year: Never true    [facility] in the Last Year: Never true   Transportation Needs: Not on file   Physical Activity: Inactive    Days of Exercise per Week: 0 days    Minutes of Exercise per Session: 0 min   Stress: Not on file   Social Connections: Not on file   Intimate Partner Violence: Not on file   Housing Stability: Not on file       Family History   Problem Relation Age of Onset    Cancer Mother         pancreatic    Cancer Father         bone    Diabetes Sister     Asthma Brother         REVIEW OF SYSTEM:     Constitutional: No fever or chills. No night sweats, no weight loss   Eyes: No eye discharge, double vision, or eye pain   HEENT: negative for sore mouth, sore throat, hoarseness and voice change   Respiratory: negative for cough , sputum, dyspnea, wheezing, hemoptysis, chest pain   Cardiovascular: negative for chest pain, dyspnea, palpitations, orthopnea, PND   Gastrointestinal: negative for nausea, vomiting, diarrhea, constipation, abdominal pain, Dysphagia, hematemesis and hematochezia   Genitourinary: negative for frequency, dysuria, nocturia, urinary incontinence, and hematuria   Integument: negative for rash, skin lesions, bruises.    Hematologic/Lymphatic: negative for easy bruising, bleeding, lymphadenopathy, petechiae and swelling/edema   Endocrine: negative for heat or cold intolerance, tremor, weight changes, change in bowel habits and hair loss   Musculoskeletal: negative for myalgias, arthralgias, pain, joint swelling,and bone pain   Neurological: negative for headaches, dizziness, seizures, weakness, numbness       OBJECTIVE: BILITOT 1.7 (H) 09/19/2022 1413        PATHOLOGY DATA:   Surgical Pathology Report  Surgical Pathology  Collected: 12/29/20 1334   Lab status: Final   Resulting lab: [facility]   Value: [id number]   [facility]   [address] [facility] [place] [place], [address] [facility]   [phone]   Fax: [phone]   SURGICAL PATHOLOGY REPORT     Patient Name: Monico Sandifer   MR#: [id number]   Specimen #[id number]         Final Diagnosis   GASTROESOPHAGEAL JUNCTION, BIOPSY:          INVASIVE ADENOCARCINOMA    Final Diagnosis   ESOPHAGUS, LESION AT 34 CM, BIOPSY:          INVASIVE ADENOCARCINOMA ARISING IN TUBULAR ADENOMA WITH HIGH   GRADE DYSPLASIA, ASSOCIATED WITH FOCAL INTESTINAL METAPLASIA (SEE   COMMENT)     Diagnosis Comment   The malignancy diagnosis is confirmed by review of a second   pathologist.  The result of HER2 IHC with reflex to  [facility] for   gastroesophageal adenocarcinoma will be issued in an addendum. ADDENDUM (SCM)     Date Ordered:     2/16/2021     Status: Signed Out        Date Complete:     2/16/2021     By: Sami Ramírez M.D. Date Reported:     2/16/2021       ADDENDUM COMMENT   This addendum is issued in order to incorporate the result of HER2 by   [address] [place] [address], performed at [address] [place] [address] ([id number], 02/16/2021). \"RESULTS:  INDETERMINATE     Interpretation:     Average HER2 signals/nucleus:  4.4   Average SUNG 17 signals/nucleus:  3.4   HER2/SUNG 17 signal ratio:  1.3   Number of Observers:  2     Even after analysis of additional cells and review of slides by a   pathologist, FISH results show a HER2/SUNG 17 ratio < 2.0 and an   average of 4-6 HER2 signals per nucleus and 3 or more SUNG 17 signals   per nucleus. The results are INDETERMINATE. In this situation, the 2016 CAP/ASCP/ASCO guidelines recommend   selecting a different tumor block for HER2 testing, if available. \"       Of note, there is only one block available for testing of invasive esophageal adenocarcinoma tumor cells. It was already used for HER2   IHC and HER2 FISH testing with the results issued in the addendums. IMAGING DATA:    Reviewed  CT chest abdomen pelvis 10/20/2021  Impression   1. Stable exam of the chest, abdomen and pelvis without evidence for   metastatic disease. 2.  The esophagus is decompressed. Mucosal enhancement in the distal   esophagus may be due to underlying varices. Underlying inflammation or mass   is considered less likely given the stability of this finding and recent   negative PET-CT. 3.  Morphologic findings of cirrhosis and portal hypertension again   demonstrated. No focal liver lesion identified. Trace abdominopelvic   ascites. Small varices. ASSESSMENT:    Sohan Arguelles is a 60 y.o. male with history of hepatitis C, liver cirrhosis, history of alcohol abuse, with esophageal cancer. I reviewed the NCCN guidelines and goals of care. Clinically appears to have T2 N0 M0  Stag II, disease. Started on preoperative  concurrent chemoradiation  Now he has completed chemoradiation  A PET scan on 7/21/21 after chemoradiation showed no metabolic evidence of active neoplasm. Induced at [place] [facility] recommended surveillance. Every week    During today's visit, the patient and the family had a number of reasonable questions which were answered to their satisfaction. They verbalized understanding of the information provided and they agreed to proceed as outlined above. PLAN:   I reviewed the recent laboratory data, imaging studies, discussed diagnosis, prognosis and treatment recommendations   Continue follow-up with gastroenterology for his liver cirrhosis   Return to clinic in 6 to 8 weeks with labs and scans prior    Garrison Solis MD  Hematologist/Medical Oncologist    On this date 9/22/22  I have spent 40 minutes reviewing previous notes, test results and face to face with the

## 2022-09-22 NOTE — TELEPHONE ENCOUNTER
Pt was in ICU for 4 days at [facility] due to hgb being 6.3, now up over 10. Saw his Oncologist today and having a paracentesis tomorrow. How soon do you want to see him?

## 2022-09-23 ENCOUNTER — HOSPITAL ENCOUNTER (OUTPATIENT)
Dept: INTERVENTIONAL RADIOLOGY/VASCULAR | Age: 62
Discharge: HOME | End: 2022-09-25
Payer: MEDICARE

## 2022-09-23 VITALS
HEIGHT: 70 IN | WEIGHT: 204.7 LBS | OXYGEN SATURATION: 98 % | SYSTOLIC BLOOD PRESSURE: 96 MMHG | HEART RATE: 88 BPM | DIASTOLIC BLOOD PRESSURE: 53 MMHG | RESPIRATION RATE: 20 BRPM | TEMPERATURE: 97 F | BODY MASS INDEX: 29.31 KG/M2

## 2022-09-23 DIAGNOSIS — K70.31 ALCOHOLIC CIRRHOSIS OF LIVER WITH ASCITES (HCC): ICD-10-CM

## 2022-09-23 PROCEDURE — 7100000011 HC PHASE II RECOVERY - ADDTL 15 MIN

## 2022-09-23 PROCEDURE — 6360000002 HC RX W HCPCS: Performed by: RADIOLOGY

## 2022-09-23 PROCEDURE — 49083 ABD PARACENTESIS W/IMAGING: CPT

## 2022-09-23 PROCEDURE — 7100000010 HC PHASE II RECOVERY - FIRST 15 MIN

## 2022-09-23 PROCEDURE — 2709999900 IR US GUIDED PARACENTESIS

## 2022-09-23 PROCEDURE — P9047 ALBUMIN (HUMAN), 25%, 50ML: HCPCS | Performed by: INTERNAL MEDICINE

## 2022-09-23 PROCEDURE — 6360000002 HC RX W HCPCS: Performed by: INTERNAL MEDICINE

## 2022-09-23 RX ORDER — ALBUMIN (HUMAN) 12.5 G/50ML
25 SOLUTION INTRAVENOUS ONCE
Status: COMPLETED | OUTPATIENT
Start: 2022-09-23 | End: 2022-09-23

## 2022-09-23 RX ORDER — ALBUMIN (HUMAN) 12.5 G/50ML
SOLUTION INTRAVENOUS CONTINUOUS PRN
Status: COMPLETED | OUTPATIENT
Start: 2022-09-23 | End: 2022-09-23

## 2022-09-23 RX ORDER — SODIUM CHLORIDE 0.9 % (FLUSH) 0.9 %
5-40 SYRINGE (ML) INJECTION EVERY 12 HOURS SCHEDULED
Status: DISCONTINUED | OUTPATIENT
Start: 2022-09-23 | End: 2022-09-26 | Stop reason: HOSPADM

## 2022-09-23 RX ORDER — SODIUM CHLORIDE 0.9 % (FLUSH) 0.9 %
5-40 SYRINGE (ML) INJECTION PRN
Status: DISCONTINUED | OUTPATIENT
Start: 2022-09-23 | End: 2022-09-26 | Stop reason: HOSPADM

## 2022-09-23 RX ORDER — SODIUM CHLORIDE 9 MG/ML
INJECTION, SOLUTION INTRAVENOUS PRN
Status: DISCONTINUED | OUTPATIENT
Start: 2022-09-23 | End: 2022-09-26 | Stop reason: HOSPADM

## 2022-09-23 RX ORDER — HEPARIN SODIUM (PORCINE) LOCK FLUSH IV SOLN 100 UNIT/ML 100 UNIT/ML
500 SOLUTION INTRAVENOUS ONCE
Status: COMPLETED | OUTPATIENT
Start: 2022-09-23 | End: 2022-09-23

## 2022-09-23 RX ADMIN — ALBUMIN (HUMAN) 25 G: 0.25 INJECTION, SOLUTION INTRAVENOUS at 13:52

## 2022-09-23 RX ADMIN — ALBUMIN (HUMAN) 25 G: 0.25 INJECTION, SOLUTION INTRAVENOUS at 14:26

## 2022-09-23 RX ADMIN — ALBUMIN (HUMAN) 25 G: 0.25 INJECTION, SOLUTION INTRAVENOUS at 14:10

## 2022-09-23 RX ADMIN — Medication 500 UNITS: at 15:08

## 2022-09-23 ASSESSMENT — PAIN - FUNCTIONAL ASSESSMENT: PAIN_FUNCTIONAL_ASSESSMENT: NONE - DENIES PAIN

## 2022-09-23 ASSESSMENT — ENCOUNTER SYMPTOMS
BLOOD IN STOOL: 0
SORE THROAT: 0
VOMITING: 0
SHORTNESS OF BREATH: 1
ABDOMINAL PAIN: 0
WHEEZING: 0
ANAL BLEEDING: 0
DIARRHEA: 0
COUGH: 0
ABDOMINAL DISTENTION: 1
TROUBLE SWALLOWING: 0
CONSTIPATION: 0
NAUSEA: 0
RHINORRHEA: 0

## 2022-09-23 NOTE — H&P (VIEW-ONLY)
HISTORY and [facility] [id number]       NAME:  Faisal Ivy  MRN: [id number]   YOB: 1960   Date: 9/23/2022   Age: 60 y.o. Gender: male     COMPLAINT AND PRESENT HISTORY:   Faisal Ivy is 60 y.o.,  male, undergoing paracentesis for alcoholic cirrhosis per Dr. Kleber Horowitz. HPI:PRE-PARACENTESIS QUESTIONNAIRE:   Last paracentesis date:9/15/22  Amount removed:9.3 L  Tolerated well: Yes  Any complications:No  Interval between paracentesis dates:Weekly   Taking medications as prescribed:Yes    Shortness of breath: Yes  ABD distention:Yes  ABD pain:No  Nausea:No  Vomiting:No  Constipation/diarrhea:No  Fever/chills:No  Recent unintended weight gain:pt reports 20lbs since last paracentesis  Leg swelling:No    Review of additional SIGNIFICANT medical hx (see chart for additional detail, including current medications / see ROS for current s/s):   ESOPHAGEAL CA, HEP. C, HX OF ETOH ABUSE,  PORTAL HTN, ESOPHAGEAL VARICES, CIRRHOSIS, HTN, HLD, JIMENES ESOPHAGUS, COVID-19, SOB. EKG 9/12/22:  Normal sinus rhythm  Low voltage QRS  Nonspecific ST and T wave abnormality  Abnormal ECG  When compared with ECG of 05-JUL-2022 14:27,  Previous ECG has undetermined rhythm, needs review    ECHO 12/20/21:  Summary  Left ventricle is normal in size. Mild left ventricular hypertrophy. Global left ventricular systolic function is normal. Estimated LV EF 60-65  %. Left atrium is mildly dilated. No significant valvular regurgitation or stenosis seen. No significant pericardial effusion is seen. Normal aortic root dimension. NPO status: This morning about 0800  Anticoagulation status: Denies any thinners/anticoagulant     Denies personal hx of blood clots. Denies personal hx of MRSA infection. Denies any personal or family hx of previous complications w/anesthesia.   PAST MEDICAL HISTORY     Past Medical History:   Diagnosis Date    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 ([address]     Alcoholic cirrhosis of liver with ascites ([address]     Anemia 04/13/2022    Anxiety     Arthritis     Back pain, chronic     dr. Hari Downs, orthopedic, every 3-4 months, gets steroid injection    Lezama esophagus     BPH (benign prostatic hypertrophy)     Cholelithiasis     Cirrhosis ([address]     COVID-19 12/2020    pt reports he had a positive test while at [facility] in 2020, was asymptomatic    COVID-19 vaccine series completed 5/20/2021, 6/22/2021    Moderna 5/20/2021, 6/22/2021    DDD (degenerative disc disease), lumbar     Depression     Esophageal cancer ([address]     INVASIVE ADENOCARCINOMA ARISING IN TUBULAR ADENOMA WITH HIGH GRADE DYSPLASIA, ASSOCIATED WITH FOCAL INTESTINAL METAPLASIA     Esophageal varices ([address]     Fatty liver     GERD (gastroesophageal reflux disease)     Hep C w/o coma, chronic ([address]     History of alcohol abuse     6-12 beers a day; quit drinking 2019    History of blood transfusion     History of colon polyps 2016    History of tobacco abuse     [place] (hard of hearing)     Hyperlipidemia     Hypertension     Hyponatremia 07/20/2016    Port-A-Cath in place     right upper chest    Portal hypertension ([address]     Sciatica     Secondary esophageal varices ([address] 06/07/2022    Shortness of breath     Spinal stenosis     Stomach ulcer     hx of    Tubular adenoma of colon 2016, 2018    Vitamin D deficiency     Wears glasses        SURGICAL HISTORY       Past Surgical History:   Procedure Laterality Date    BUNIONECTOMY      twice on right side    BUNIONECTOMY Left     CARPAL TUNNEL RELEASE Right     COLONOSCOPY      at age 35    COLONOSCOPY  10/05/2016    polyps-pathology tubular adenoma, and abnormal looking mucosa right colon-pathology-tubular adenoma    COLONOSCOPY N/A 03/30/2018    COLONOSCOPY POLYPECTOMY COLD BIOPSY performed by Rocky Pierre MD at [place] [address]  03/30/2018    Small polyp in the sigmoid colon and excised with biopsy forceps--tubular adenoma    COLONOSCOPY N/A 04/16/2022    COLONOSCOPY POLYPECTOMY performed by Sony Rose MD at [facility], DIAGNOSTIC      EGD    IR PORT PLACEMENT EQUAL OR GREATER THAN 5 YEARS  04/19/2021    IR PORT PLACEMENT EQUAL OR GREATER THAN 5 YEARS 4/19/2021 ST SPECIAL PROCEDURES    KNEE SURGERY Left     cyst removed    NASAL SEPTUM SURGERY      NERVE BLOCK Right 11/23/2020    NERVE BLOCK RIGHT CERVICAL STEROID INJECTION  C3-C6 performed by Sebas Bell MD at [facility]  01/04/2016    steroid injection C7 T1    OTHER SURGICAL HISTORY  11/21/2016    Bilateral Lumbar CACHORRO L4-L5 injections    OTHER SURGICAL HISTORY  12/19/2016    lumbar steroid injection    OTHER SURGICAL HISTORY  09/28/2018    BILATERAL L5 CACHORRO (N/A Back)    OTHER SURGICAL HISTORY Right 11/23/2020    cervical injection    PAIN MANAGEMENT PROCEDURE Left 07/09/2020    EPIDURAL STEROID INJECTION LEFT L4 L5 performed by Sebas Bell MD at [address] [place] [address] Left 07/20/2020    LEFT L4 L5 EPIDURAL STEROID INJECTION performed by Sebas Bell MD at [address] [place] [address] Bilateral 08/17/2020    LUMBAR FACET BILATERAL L2-L5 performed by Sebas Bell MD at [address] [place] [address] Bilateral 12/07/2020    NERVE BLOCK BILATERAL LUMBAR MEDIAL BRANCH L2-L5 performed by Sebas Bell MD at [address] [place] [address] AA&/STRD TFRML EPI LUMBAR/SACRAL 1 LEVEL Bilateral 09/06/2018    BILATERAL L5 CACHORRO performed by Sebas Bell MD at [facility] AA&/STRD TFRML EPI LUMBAR/SACRAL 1 LEVEL N/A 09/28/2018    BILATERAL L5 CACHORRO performed by Sebas Bell MD at [facility] N/CARPAL TUNNEL SURG Right 08/29/2017    CARPAL TUNNEL RELEASE RIGHT [facility] N/CARPAL TUNNEL SURG Left 10/31/2017    CARPAL TUNNEL RELEASE performed by Luis Carlos Chambers MD at [address] [place] [address] 12/29/2020    EGD BIOPSY performed by Lucian Randy Dick MD at [address] [place] [address] 2021    EGD BIOPSY and spot marking performed by Aleksandr Magaña MD at [address] [place] [address] 2021    ENDOSCOPIC ULTRASOUND, EGD performed by Kameron Garcia MD at [facility] 1  2021    EGD DIAGNOSTIC ONLY performed by Kameron Garcia MD at [facility] 1 2021    EGD BIOPSY performed by Aleksandr Magaña MD at [address] [place] [address] 2022    EGD BIOPSY performed by Aleksandr Magaña MD at [address] [place] [address] 04/15/2022    EGD ESOPHAGOGASTRODUODENOSCOPY performed by Sean Reinoso MD at [facility] N/A 2022    EGD BAND LIGATION performed by Catalina Kovacs MD at [address] [place] [address] N/A 2022    EGD ESOPHAGOGASTRODUODENOSCOPY performed by Catalina Kovacs MD at [address] [place] [address] N/A 2022    EGD ESOPHAGOGASTRODUODENOSCOPY ENDOSCOPIC APC AT [facility] performed by Nahid Melvin MD at [address] [place] [address] History     Socioeconomic History    Marital status: Single   Tobacco Use    Smoking status: Former     Packs/day: 1.00     Years: 45.00     Pack years: 45.00     Types: Cigarettes     Quit date: 2017     Years since quittin.6    Smokeless tobacco: Never   Vaping Use    Vaping Use: Never used   Substance and Sexual Activity    Alcohol use: Not Currently     Comment: Quit alcohol in 2019- heavier drinking prior to quitting    Drug use: Not Currently     Frequency: 1.0 times per week     Types: Cocaine     Comment: Cocaine- stopped spring 2016    Sexual activity: Yes     Partners: Female   Social History Narrative     in the past, retired     Social Determinants of Health     Financial Resource Strain: Low Risk     Difficulty of Paying Living Expenses: Not hard at all   Food Insecurity: No Food Insecurity    Worried About Running Out of Food in the Last Year: Never true    Ran Out of Food in the Last Year: Never true   Physical Activity: Inactive    Days of Exercise per Week: 0 days    Minutes of Exercise per Session: 0 min       REVIEW OF SYSTEMS    No Known Allergies    Current Outpatient Medications on File Prior to Encounter   Medication Sig Dispense Refill    nadolol (CORGARD) 20 MG tablet take 1 tablet by mouth once daily 30 tablet 2    midodrine (PROAMATINE) 5 MG tablet Take 2 tablets by mouth in the morning and 2 tablets at noon and 2 tablets before bedtime. 90 tablet 3    furosemide (LASIX) 20 MG tablet Take 1 tablet by mouth 2 times daily 60 tablet 3    spironolactone (ALDACTONE) 100 MG tablet Take 1 tablet by mouth every evening (Patient taking differently: Take 100 mg by mouth 3 times daily) 30 tablet 1    ondansetron (ZOFRAN-ODT) 4 MG disintegrating tablet dissolve 1 tablet by mouth every 8 hours if needed for nausea and vomiting 10 tablet 0    lactulose (CHRONULAC) 10 GM/15ML solution take 30 milliliters by mouth three times a day 473 mL 1    FLUoxetine (PROZAC) 20 MG capsule Take 1 capsule by mouth daily 30 capsule 3    atorvastatin (LIPITOR) 20 MG tablet Take 1 tablet by mouth nightly 30 tablet 3    ferrous sulfate (IRON 325) 325 (65 Fe) MG tablet Take 1 tablet by mouth 2 times daily 60 tablet 5     No current facility-administered medications on file prior to encounter. Review of Systems   Constitutional:  Negative for chills and fever. HENT:  Negative for congestion, ear pain, postnasal drip, rhinorrhea, sore throat and trouble swallowing. Respiratory:  Positive for shortness of breath. Negative for cough and wheezing. Cardiovascular:  Negative for chest pain, palpitations and leg swelling. Gastrointestinal:  Positive for abdominal distention.  Negative for abdominal pain, anal bleeding, blood in stool, constipation, diarrhea, nausea and vomiting. Genitourinary:  Negative for dysuria and frequency. Skin:  Negative for rash. Neurological:  Negative for dizziness and headaches. Hematological:  Does not bruise/bleed easily. GENERAL PHYSICAL EXAM     Vitals: See nurse flowsheet    GENERAL APPEARANCE:   Daniela Bernal is 60 y.o.,  male, not obese, nourished, conscious, alert. Does not appear to be in any distress or pain at this time. Physical Exam  Constitutional:       General: He is not in acute distress. Appearance: He is obese. HENT:      Head: Normocephalic. Nose: Nose normal.      Mouth/Throat:      Mouth: Mucous membranes are dry. Pharynx: Oropharynx is clear. Comments: Multiple dental caries   Eyes:      Extraocular Movements: Extraocular movements intact. Pupils: Pupils are equal, round, and reactive to light. Comments: Wearing glasses   Cardiovascular:      Rate and Rhythm: Normal rate and regular rhythm. Pulmonary:      Effort: Pulmonary effort is normal.      Breath sounds: Normal breath sounds. Abdominal:      General: Abdomen is protuberant. Bowel sounds are normal. There is distension. Comments: ascites   Musculoskeletal:         General: Normal range of motion. Cervical back: Normal range of motion. Skin:     General: Skin is warm and dry. Findings: No bruising. Neurological:      General: No focal deficit present. Mental Status: He is alert and oriented to person, place, and time. Mental status is at baseline.        RECENT LAB WORK     Lab Results   Component Value Date     (L) 09/19/2022    K 3.9 09/19/2022     09/19/2022    CO2 24 09/19/2022    BUN 9 09/19/2022    CREATININE 0.69 (L) 09/19/2022    GLUCOSE 96 09/19/2022    CALCIUM 8.4 (L) 09/19/2022    PROT 5.5 (L) 09/19/2022    LABALBU 3.2 (L) 09/19/2022    BILITOT 1.7 (H) 09/19/2022    ALKPHOS 159 (H) 09/19/2022    AST 37 09/19/2022    ALT 14 09/19/2022    LABGLOM >60 09/19/2022    GFRAA >60 09/19/2022    GLOB NOT REPORTED 08/19/2021     Lab Results   Component Value Date    WBC 6.6 09/19/2022    HGB 10.1 (L) 09/19/2022    HCT 31.5 (L) 09/19/2022    MCV 84.4 09/19/2022     09/19/2022     PROVISIONAL DIAGNOSES / PROCEDURE:    Paracentesis     Alcoholic cirrhosis   Patient Active Problem List    Diagnosis Date Noted    GI bleed 09/13/2022    Secondary esophageal varices ([address] 06/07/2022    Esophageal polyp 06/07/2022    Drop in hemoglobin 06/03/2022    Portal hypertension (HCC)     Shortness of breath     Ascites 04/26/2022    Acute kidney failure, unspecified ([address] 04/21/2022    Muscle weakness (generalized) 07/28/2016    Other abnormalities of gait and mobility 07/28/2016    Anemia 04/13/2022    Acute kidney injury ([address] 04/13/2022    Esophageal adenocarcinoma ([address]     Low hemoglobin 12/20/2021    Symptomatic anemia, microcytic, acute 12/20/2021    Hypotension 12/20/2021    Former smoker, 50+ pack years, quit 2016 12/20/2021    HLD (hyperlipidemia) 12/20/2021    Abnormal findings on diagnostic imaging of spine 12/14/2021    Cervical spinal stenosis 12/14/2021    Spinal stenosis of lumbar region with neurogenic claudication 12/14/2021    Severe comorbid illness 11/30/2021    Gait instability 11/30/2021    Current smoker 04/05/2021    COVID-19 02/23/2021    Anxiety 02/23/2021    Malignant neoplasm of lower third of esophagus ([address]     Hypocalcemia 12/26/2020    Hypophosphatemia 12/26/2020    Gastrointestinal hemorrhage with melena     Alcohol abuse     Altered mental status     Acute gastrointestinal bleeding 12/23/2020    Thrombocytopenia ([address] 12/23/2020    Hepatitis C virus infection resolved after antiviral drug therapy 12/23/2020    Cervical facet syndrome 11/23/2020    Lumbar facet arthropathy 08/17/2020    Elevated LFTs 08/12/2020    Seasonal allergies 08/12/2020    S/P epidural steroid injection 08/05/2020    Essential hypertension 04/24/2019    Recurrent major depressive disorder in partial remission ([facility] 04/24/2019    Pure hypercholesterolemia 02/04/2019    Hypokalemia 02/04/2019    Vitamin D deficiency 09/20/2017    History of hepatitis C 09/11/2017    Ganglion cyst 05/31/2017    Carpal tunnel syndrome of right wrist 05/31/2017    Tinnitus 03/23/2017    Eustachian tube dysfunction 03/23/2017    Lumbar radiculitis 11/08/2016    Lumbar disc herniation 11/08/2016    Gynecomastia, male 10/26/2016    Depression 10/13/2016    Vertebrogenic low back pain 10/06/2016    DDD (degenerative disc disease), lumbar 10/06/2016    Hepatic cirrhosis ([address] 09/15/2016    Psychophysiologic insomnia 09/14/2016    Cirrhosis ([address]     Hep C w/o coma, chronic (HCC)     Fatty liver     Calculus of gallbladder without cholecystitis 08/10/2016    Chronic viral hepatitis B without delta agent and without coma ([address] 07/22/2016    Hypomagnesemia     Alcohol withdrawal syndrome without complication ([address] 05/14/5353    Cervical radicular pain 01/04/2016    Tubular adenoma of colon 01/01/2016    History of colon polyps 01/01/2016    Back pain, chronic 04/19/2012    Hearing difficulty 04/19/2012    GERD (gastroesophageal reflux disease) 04/19/2012           JAMAR BASS, MASSIMO - CNP on 9/23/2022 at 12:04 PM

## 2022-09-23 NOTE — H&P
HISTORY and [address] [id number]       NAME:  Rodney Krueger  MRN: [id number]   YOB: 1960   Date: 9/23/2022   Age: 60 y.o. Gender: male     COMPLAINT AND PRESENT HISTORY:   Rodney Krueger is 60 y.o.,  male, undergoing paracentesis for alcoholic cirrhosis per Dr. Ely Barreto. HPI:PRE-PARACENTESIS QUESTIONNAIRE:   Last paracentesis date:9/15/22  Amount removed:9.3 L  Tolerated well: Yes  Any complications:No  Interval between paracentesis dates:Weekly   Taking medications as prescribed:Yes    Shortness of breath: Yes  ABD distention:Yes  ABD pain:No  Nausea:No  Vomiting:No  Constipation/diarrhea:No  Fever/chills:No  Recent unintended weight gain:pt reports 20lbs since last paracentesis  Leg swelling:No    Review of additional SIGNIFICANT medical hx (see chart for additional detail, including current medications / see ROS for current s/s):   ESOPHAGEAL CA, HEP. C, HX OF ETOH ABUSE,  PORTAL HTN, ESOPHAGEAL VARICES, CIRRHOSIS, HTN, HLD, JIMENES ESOPHAGUS, COVID-19, SOB. EKG 9/12/22:  Normal sinus rhythm  Low voltage QRS  Nonspecific ST and T wave abnormality  Abnormal ECG  When compared with ECG of 05-JUL-2022 14:27,  Previous ECG has undetermined rhythm, needs review    ECHO 12/20/21:  Summary  Left ventricle is normal in size. Mild left ventricular hypertrophy. Global left ventricular systolic function is normal. Estimated LV EF 60-65  %. Left atrium is mildly dilated. No significant valvular regurgitation or stenosis seen. No significant pericardial effusion is seen. Normal aortic root dimension. NPO status: This morning about 0800  Anticoagulation status: Denies any thinners/anticoagulant     Denies personal hx of blood clots. Denies personal hx of MRSA infection. Denies any personal or family hx of previous complications w/anesthesia.   PAST MEDICAL HISTORY     Past Medical History:   Diagnosis Date    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 ([address]     Alcoholic cirrhosis of liver with ascites ([address]     Anemia 04/13/2022    Anxiety     Arthritis     Back pain, chronic     dr. Migdalia Caicedo, orthopedic, every 3-4 months, gets steroid injection    Lezama esophagus     BPH (benign prostatic hypertrophy)     Cholelithiasis     Cirrhosis ([address]     COVID-19 12/2020    pt reports he had a positive test while at [facility] in 2020, was asymptomatic    COVID-19 vaccine series completed 5/20/2021, 6/22/2021    Moderna 5/20/2021, 6/22/2021    DDD (degenerative disc disease), lumbar     Depression     Esophageal cancer ([address]     INVASIVE ADENOCARCINOMA ARISING IN TUBULAR ADENOMA WITH HIGH GRADE DYSPLASIA, ASSOCIATED WITH FOCAL INTESTINAL METAPLASIA     Esophageal varices ([address]     Fatty liver     GERD (gastroesophageal reflux disease)     Hep C w/o coma, chronic ([address]     History of alcohol abuse     6-12 beers a day; quit drinking 2019    History of blood transfusion     History of colon polyps 2016    History of tobacco abuse     Kake (hard of hearing)     Hyperlipidemia     Hypertension     Hyponatremia 07/20/2016    Port-A-Cath in place     right upper chest    Portal hypertension ([address]     Sciatica     Secondary esophageal varices ([address] 06/07/2022    Shortness of breath     Spinal stenosis     Stomach ulcer     hx of    Tubular adenoma of colon 2016, 2018    Vitamin D deficiency     Wears glasses        SURGICAL HISTORY       Past Surgical History:   Procedure Laterality Date    BUNIONECTOMY      twice on right side    BUNIONECTOMY Left     CARPAL TUNNEL RELEASE Right     COLONOSCOPY      at age 35    COLONOSCOPY  10/05/2016    polyps-pathology tubular adenoma, and abnormal looking mucosa right colon-pathology-tubular adenoma    COLONOSCOPY N/A 03/30/2018    COLONOSCOPY POLYPECTOMY COLD BIOPSY performed by Urvashi Thomas MD at [address] [place] [address]  03/30/2018    Small polyp in the sigmoid colon and excised with biopsy forceps--tubular adenoma    COLONOSCOPY N/A 04/16/2022    COLONOSCOPY POLYPECTOMY performed by Jennifer Barlow MD at [facility], DIAGNOSTIC      EGD    IR PORT PLACEMENT EQUAL OR GREATER THAN 5 YEARS  04/19/2021    IR PORT PLACEMENT EQUAL OR GREATER THAN 5 YEARS 4/19/2021 ST [facility] PROC[facility]    KNEE SURGERY Left     cyst removed    NASAL SEPTUM SURGERY      NERVE BLOCK Right 11/23/2020    NERVE BLOCK RIGHT CERVICAL STEROID INJECTION  C3-C6 performed by Guanaco Chambers MD at [address]  01/04/2016    steroid injection C7 T1    OTHER SURGICAL HISTORY  11/21/2016    Bilateral Lumbar CACHORRO L4-L5 injections    OTHER SURGICAL HISTORY  12/19/2016    lumbar steroid injection    OTHER SURGICAL HISTORY  09/28/2018    BILATERAL L5 CACHORRO (N/A Back)    OTHER SURGICAL HISTORY Right 11/23/2020    cervical injection    PAIN MANAGEMENT PROCEDURE Left 07/09/2020    EPIDURAL STEROID INJECTION LEFT L4 L5 performed by Guanaco Chambers MD at [address] Left 07/20/2020    LEFT L4 L5 EPIDURAL STEROID INJECTION performed by Guanaco Chambers MD at [address] Bilateral 08/17/2020    LUMBAR FACET BILATERAL L2-L5 performed by Gaunaco Chambers MD at [address] Bilateral 12/07/2020    NERVE BLOCK BILATERAL LUMBAR MEDIAL BRANCH L2-L5 performed by Guanaco Chambers MD at [address] [facility] [address] AA&/STRD TFRML EPI LUMBAR/SACRAL 1 LEVEL Bilateral 09/06/2018    BILATERAL L5 CACHORRO performed by Guanaco Chambers MD at [address] AA&/STRD TFRML EPI LUMBAR/SACRAL 1 LEVEL N/A 09/28/2018    BILATERAL L5 CACHORRO performed by Guanaco Chambers MD at [address] [facility] N/CARPAL TUNNEL SURG Right 08/29/2017    CARPAL TUNNEL RELEASE RIGHT performed by Mayr Joseph MD at [address] [facility] N/CARPAL TUNNEL SURG Left 10/31/2017    CARPAL TUNNEL RELEASE performed by Mary Joseph MD at [address] [facility] 12/29/2020    EGD BIOPSY performed by Lucian Jorge Roa MD at [address] [place] [address] 2021    EGD BIOPSY and spot marking performed by Jennifer Barlow MD at [address] [place] [address] 2021    ENDOSCOPIC ULTRASOUND, EGD performed by Jeniffer Menchaca MD at [facility] 1  2021    EGD DIAGNOSTIC ONLY performed by Jeniffer Menchaca MD at [facility] 1 2021    EGD BIOPSY performed by Jennifer Barlow MD at [address] [place] [address] 2022    EGD BIOPSY performed by Jennifer Barlow MD at [address] [place] [address] 04/15/2022    EGD ESOPHAGOGASTRODUODENOSCOPY performed by Diana Mccurdy MD at [address] [place] [address]/A 2022    EGD BAND LIGATION performed by Corinna Feliciano MD at [address] [place] [address]/ 2022    EGD ESOPHAGOGASTRODUODENOSCOPY performed by Corinna Feliciano MD at [address] [place] [address] N/A 2022    EGD ESOPHAGOGASTRODUODENOSCOPY ENDOSCOPIC APC AT [facility] performed by Henok Maravilla MD at [address] [place] [address] History     Socioeconomic History    Marital status: Single   Tobacco Use    Smoking status: Former     Packs/day: 1.00     Years: 45.00     Pack years: 45.00     Types: Cigarettes     Quit date: 2017     Years since quittin.6    Smokeless tobacco: Never   Vaping Use    Vaping Use: Never used   Substance and Sexual Activity    Alcohol use: Not Currently     Comment: Quit alcohol in 2019- heavier drinking prior to quitting    Drug use: Not Currently     Frequency: 1.0 times per week     Types: Cocaine     Comment: Cocaine- stopped spring 2016    Sexual activity: Yes     Partners: Female   Social History Narrative     in the past, retired     Social Determinants of Health     Financial Resource Strain: Low Risk     Difficulty of Paying Living Expenses: Not hard at all   Food Insecurity: No Food Insecurity    Worried About Running Out of Food in the Last Year: Never true    Ran Out of Food in the Last Year: Never true   Physical Activity: Inactive    Days of Exercise per Week: 0 days    Minutes of Exercise per Session: 0 min       REVIEW OF SYSTEMS    No Known Allergies    Current Outpatient Medications on File Prior to Encounter   Medication Sig Dispense Refill    nadolol (CORGARD) 20 MG tablet take 1 tablet by mouth once daily 30 tablet 2    midodrine (PROAMATINE) 5 MG tablet Take 2 tablets by mouth in the morning and 2 tablets at noon and 2 tablets before bedtime. 90 tablet 3    furosemide (LASIX) 20 MG tablet Take 1 tablet by mouth 2 times daily 60 tablet 3    spironolactone (ALDACTONE) 100 MG tablet Take 1 tablet by mouth every evening (Patient taking differently: Take 100 mg by mouth 3 times daily) 30 tablet 1    ondansetron (ZOFRAN-ODT) 4 MG disintegrating tablet dissolve 1 tablet by mouth every 8 hours if needed for nausea and vomiting 10 tablet 0    lactulose (CHRONULAC) 10 GM/15ML solution take 30 milliliters by mouth three times a day 473 mL 1    FLUoxetine (PROZAC) 20 MG capsule Take 1 capsule by mouth daily 30 capsule 3    atorvastatin (LIPITOR) 20 MG tablet Take 1 tablet by mouth nightly 30 tablet 3    ferrous sulfate (IRON 325) 325 (65 Fe) MG tablet Take 1 tablet by mouth 2 times daily 60 tablet 5     No current facility-administered medications on file prior to encounter. Review of Systems   Constitutional:  Negative for chills and fever. HENT:  Negative for congestion, ear pain, postnasal drip, rhinorrhea, sore throat and trouble swallowing. Respiratory:  Positive for shortness of breath. Negative for cough and wheezing. Cardiovascular:  Negative for chest pain, palpitations and leg swelling. Gastrointestinal:  Positive for abdominal distention.  Negative for abdominal pain, anal bleeding, blood in stool, constipation, diarrhea, nausea and vomiting. Genitourinary:  Negative for dysuria and frequency. Skin:  Negative for rash. Neurological:  Negative for dizziness and headaches. Hematological:  Does not bruise/bleed easily. GENERAL PHYSICAL EXAM     Vitals: See nurse flowsheet    GENERAL APPEARANCE:   An Dinero is 60 y.o.,  male, not obese, nourished, conscious, alert. Does not appear to be in any distress or pain at this time. Physical Exam  Constitutional:       General: He is not in acute distress. Appearance: He is obese. HENT:      Head: Normocephalic. Nose: Nose normal.      Mouth/Throat:      Mouth: Mucous membranes are dry. Pharynx: Oropharynx is clear. Comments: Multiple dental caries   Eyes:      Extraocular Movements: Extraocular movements intact. Pupils: Pupils are equal, round, and reactive to light. Comments: Wearing glasses   Cardiovascular:      Rate and Rhythm: Normal rate and regular rhythm. Pulmonary:      Effort: Pulmonary effort is normal.      Breath sounds: Normal breath sounds. Abdominal:      General: Abdomen is protuberant. Bowel sounds are normal. There is distension. Comments: ascites   Musculoskeletal:         General: Normal range of motion. Cervical back: Normal range of motion. Skin:     General: Skin is warm and dry. Findings: No bruising. Neurological:      General: No focal deficit present. Mental Status: He is alert and oriented to person, place, and time. Mental status is at baseline.        RECENT LAB WORK     Lab Results   Component Value Date     (L) 09/19/2022    K 3.9 09/19/2022     09/19/2022    CO2 24 09/19/2022    BUN 9 09/19/2022    CREATININE 0.69 (L) 09/19/2022    GLUCOSE 96 09/19/2022    CALCIUM 8.4 (L) 09/19/2022    PROT 5.5 (L) 09/19/2022    LABALBU 3.2 (L) 09/19/2022    BILITOT 1.7 (H) 09/19/2022    ALKPHOS 159 (H) 09/19/2022    AST 37 09/19/2022    ALT 14 09/19/2022    LABGLOM >60 09/19/2022    GFRAA >60 09/19/2022    GLOB NOT REPORTED 08/19/2021     Lab Results   Component Value Date    WBC 6.6 09/19/2022    HGB 10.1 (L) 09/19/2022    HCT 31.5 (L) 09/19/2022    MCV 84.4 09/19/2022     09/19/2022     PROVISIONAL DIAGNOSES / PROCEDURE:    Paracentesis     Alcoholic cirrhosis   Patient Active Problem List    Diagnosis Date Noted    GI bleed 09/13/2022    Secondary esophageal varices ([address] 06/07/2022    Esophageal polyp 06/07/2022    Drop in hemoglobin 06/03/2022    Portal hypertension (HCC)     Shortness of breath     Ascites 04/26/2022    Acute kidney failure, unspecified ([address] 04/21/2022    Muscle weakness (generalized) 07/28/2016    Other abnormalities of gait and mobility 07/28/2016    Anemia 04/13/2022    Acute kidney injury ([address] 04/13/2022    Esophageal adenocarcinoma ([address]     Low hemoglobin 12/20/2021    Symptomatic anemia, microcytic, acute 12/20/2021    Hypotension 12/20/2021    Former smoker, 50+ pack years, quit 2016 12/20/2021    HLD (hyperlipidemia) 12/20/2021    Abnormal findings on diagnostic imaging of spine 12/14/2021    Cervical spinal stenosis 12/14/2021    Spinal stenosis of lumbar region with neurogenic claudication 12/14/2021    Severe comorbid illness 11/30/2021    Gait instability 11/30/2021    Current smoker 04/05/2021    COVID-19 02/23/2021    Anxiety 02/23/2021    Malignant neoplasm of lower third of esophagus ([address]     Hypocalcemia 12/26/2020    Hypophosphatemia 12/26/2020    Gastrointestinal hemorrhage with melena     Alcohol abuse     Altered mental status     Acute gastrointestinal bleeding 12/23/2020    Thrombocytopenia ([address] 12/23/2020    Hepatitis C virus infection resolved after antiviral drug therapy 12/23/2020    Cervical facet syndrome 11/23/2020    Lumbar facet arthropathy 08/17/2020    Elevated LFTs 08/12/2020    Seasonal allergies 08/12/2020    S/P epidural steroid injection 08/05/2020    Essential hypertension 04/24/2019    Recurrent major depressive disorder in partial remission ([address] 04/24/2019    Pure hypercholesterolemia 02/04/2019    Hypokalemia 02/04/2019    Vitamin D deficiency 09/20/2017    History of hepatitis C 09/11/2017    Ganglion cyst 05/31/2017    Carpal tunnel syndrome of right wrist 05/31/2017    Tinnitus 03/23/2017    Eustachian tube dysfunction 03/23/2017    Lumbar radiculitis 11/08/2016    Lumbar disc herniation 11/08/2016    Gynecomastia, male 10/26/2016    Depression 10/13/2016    Vertebrogenic low back pain 10/06/2016    DDD (degenerative disc disease), lumbar 10/06/2016    Hepatic cirrhosis ([address] 09/15/2016    Psychophysiologic insomnia 09/14/2016    Cirrhosis ([address]     Hep C w/o coma, chronic (HCC)     Fatty liver     Calculus of gallbladder without cholecystitis 08/10/2016    Chronic viral hepatitis B without delta agent and without coma ([address] 07/22/2016    Hypomagnesemia     Alcohol withdrawal syndrome without complication ([address] 75/06/1593    Cervical radicular pain 01/04/2016    Tubular adenoma of colon 01/01/2016    History of colon polyps 01/01/2016    Back pain, chronic 04/19/2012    Hearing difficulty 04/19/2012    GERD (gastroesophageal reflux disease) 04/19/2012           JAMAR BASS, MASSIMO - CNP on 9/23/2022 at 12:04 PM

## 2022-09-23 NOTE — PROGRESS NOTES
[facility] OFFICE FOLLOW UP    INTERVAL HISTORY:   No referring provider defined for this encounter. Chief Complaint   Patient presents with    Follow-up     Patient is here today to f/u on 1/21/22 EGD       HISTORY OF PRESENT ILLNESS:     Patient being seen for follow-up. 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 He is being followed by     Present he complains of abdominal distention. Because of the distention it appears that he is having significant back pain. No nausea vomiting. He states that he is following salt restricted diet. No swelling of the lower extremities. No melanotic stools or hematochezia. No signs and symptoms of encephalopathy. Past Medical,Family, and Social History reviewed and does contribute to the patient presenting condition. Patient's PMH/PSH,SH,PSYCH Hx, MEDs, ALLERGIES, and ROS were all reviewed and updated in the appropriate sections.  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 Saadia Dc, orthopedic, every 3-4 months, gets steroid injection    Steinberg esophagus     BPH (benign prostatic hypertrophy)     Cholelithiasis     Cirrhosis ([address]     COVID-19 12/2020    pt reports he had a positive test while at [facility] in 2020, was asymptomatic    COVID-19 vaccine series completed 5/20/2021, 6/22/2021    Moderna 5/20/2021, 6/22/2021    DDD (degenerative disc disease), lumbar     Depression     Esophageal cancer ([address]     INVASIVE ADENOCARCINOMA ARISING IN TUBULAR ADENOMA WITH HIGH GRADE DYSPLASIA, ASSOCIATED WITH FOCAL INTESTINAL METAPLASIA     Esophageal varices ([address]     Fatty liver     GERD (gastroesophageal reflux disease)     Hep C w/o coma, chronic ([address]     History of alcohol abuse     6-12 beers a day; quit drinking July 2016    History of blood transfusion     History of colon polyps 2016    History of tobacco abuse     [place] (hard of hearing)     Hyperlipidemia     Hypertension     Port-A-Cath in place     right upper chest    Stomach ulcer     hx of    Tubular adenoma of colon 2016, 2018    Vitamin D deficiency     Wears glasses        Past Surgical History:   Procedure Laterality Date    BUNIONECTOMY      twice on right side    BUNIONECTOMY Left     CARPAL TUNNEL RELEASE Right     COLONOSCOPY      at age 35    COLONOSCOPY  10/05/2016    polyps-pathology tubular adenoma, and abnormal looking mucosa right colon-pathology-tubular adenoma    COLONOSCOPY N/A 3/30/2018    COLONOSCOPY POLYPECTOMY COLD BIOPSY performed by Francesco Richards MD at [address] [place] [address]  03/30/2018    Small polyp in the sigmoid colon and excised with biopsy forceps--tubular adenoma    ENDOSCOPY, COLON, DIAGNOSTIC      EGD    IR PORT PLACEMENT EQUAL OR GREATER THAN 5 YEARS  4/19/2021    IR PORT PLACEMENT EQUAL OR GREATER THAN 5 YEARS 4/19/2021 [facility] PROCED[facility]    KNEE SURGERY Left     cyst removed    NASAL SEPTUM SURGERY      NERVE BLOCK Right 11/23/2020    NERVE BLOCK RIGHT CERVICAL STEROID INJECTION  C3-C6 performed by Alejo Parks MD at [facility]  01/04/16    steroid injection C7 T1    OTHER SURGICAL HISTORY  11/21/2016    Bilateral Lumbar CACHORRO L4-L5 injections    OTHER SURGICAL HISTORY  12/19/2016    lumbar steroid injection    OTHER SURGICAL HISTORY  09/28/2018    BILATERAL L5 CACHORRO (N/A Back)    OTHER SURGICAL HISTORY Right 11/23/2020    cervical injection    PAIN MANAGEMENT PROCEDURE Left 7/9/2020    EPIDURAL STEROID INJECTION LEFT L4 L5 performed by Alejo Parks MD at [place] [address] Left 7/20/2020    LEFT L4 L5 EPIDURAL STEROID INJECTION performed by Alejo Parks MD at [address] [place] [address]  PAIN MANAGEMENT PROCEDURE Bilateral 8/17/2020    LUMBAR FACET BILATERAL L2-L5 performed by Sterling Castillo MD at [address] Bilateral 12/7/2020    NERVE BLOCK BILATERAL LUMBAR MEDIAL BRANCH L2-L5 performed by Sterling Castillo MD at [address] AA&/STRD TFRML EPI LUMBAR/SACRAL 1 LEVEL Bilateral 9/6/2018    BILATERAL L5 CACHORRO performed by Sterling Castillo MD at [address] AA&/STRD TFRML EPI LUMBAR/SACRAL 1 LEVEL N/A 9/28/2018    BILATERAL L5 CACHORRO performed by Sterling Castillo MD at [address] N/CARPAL TUNNEL SURG Right 8/29/2017    CARPAL TUNNEL RELEASE RIGHT performed by Danny Maher MD at [address] [facility] N/CARPAL TUNNEL SURG Left 10/31/2017    CARPAL TUNNEL RELEASE performed by Danny Maher MD at [address] [place] [address] 12/29/2020    EGD BIOPSY performed by Trino Nye MD at [address] [place] [address] 2/2/2021    EGD BIOPSY and spot marking performed by Betzaida Cantor MD at [address] [place] [address] 2/12/2021    ENDOSCOPIC ULTRASOUND, EGD performed by Maribel Scales MD at [address] [place] [address]  2/12/2021    EGD DIAGNOSTIC ONLY performed by Maribel Scales MD at [address] [place] [address] N/A 8/31/2021    EGD BIOPSY performed by Betazida Cantor MD at [address] [place] [address] N/A 1/21/2022    EGD BIOPSY performed by Betzaida Cantor MD at [facility] [place] [facility]:    Current Outpatient Medications:     vitamin D3 (CHOLECALCIFEROL) 25 MCG (1000 UT) TABS tablet, take 1 tablet by mouth once daily, Disp: 30 tablet, Rfl: 0    nadolol (CORGARD) 20 MG tablet, Take 1 tablet by mouth daily, Disp: 30 tablet, Rfl: 3    furosemide (LASIX) 20 MG tablet, Take 1 tablet by mouth daily, Disp: 60 tablet, Rfl: 0    ferrous sulfate (IRON 325) 325 (65 Fe) MG tablet, Take 1 tablet by mouth 2 times daily, Disp: 60 tablet, Rfl: 5    omeprazole (PRILOSEC) 40 MG delayed release capsule, take 1 capsule by mouth EVERY MORNING BEFORE BREAKFAST, Disp: 30 capsule, Rfl: 2    FLUoxetine (PROZAC) 20 MG capsule, take 1 capsule by mouth once daily, Disp: 30 capsule, Rfl: 3    lidocaine-prilocaine (EMLA) 2.5-2.5 % cream, Apply topically 45-60 mins prior to needle poke daily PRN, Disp: 1 Tube, Rfl: 2    hydrOXYzine (VISTARIL) 25 MG capsule, take 1 capsule by mouth three times a day if needed for anxiety, Disp: 90 capsule, Rfl: 2    atorvastatin (LIPITOR) 20 MG tablet, take 1 tablet by mouth at bedtime, Disp: 90 tablet, Rfl: 1    spironolactone (ALDACTONE) 50 MG tablet, take 1 tablet by mouth once daily, Disp: 90 tablet, Rfl: 3    ALLERGIES:   Allergies   Allergen Reactions    Bee Pollen Other (See Comments)     Runny nose, watery eyes    Pollen Extract      Runny nose, watery eyes       FAMILY HISTORY:       Problem Relation Age of Onset    Cancer Mother         pancreatic    Cancer Father         bone    Diabetes Sister     Asthma Brother          SOCIAL HISTORY:   Social History     Socioeconomic History    Marital status: Single     Spouse name: Not on file    Number of children: Not on file    Years of education: Not on file    Highest education level: Not on file   Occupational History    Not on file   Tobacco Use    Smoking status: Former Smoker     Packs/day: 1.00     Years: 45.00     Pack years: 45.00     Quit date: 2017     Years since quittin.1    Smokeless tobacco: Never Used   Vaping Use    Vaping Use: Never used   Substance and Sexual Activity    Alcohol use: Not Currently     Comment: quit     Drug use: Not Currently     Frequency: 1.0 times per week     Comment: cocaine,  stopped spring 2016    Sexual activity: Yes     Partners: Female   Other Topics Concern    Not on file   Social History Narrative     in the past, retired     Social Determinants of Health     Financial Resource Strain:     Difficulty of Paying Living Expenses: Not on file   Food Insecurity:     Worried About [address] in the Last Year: Not on file    [address] in the Last Year: Not on file   Transportation Needs:     Lack of Transportation (Medical): Not on file    Lack of Transportation (Non-Medical): Not on file   Physical Activity:     Days of Exercise per Week: Not on file    Minutes of Exercise per Session: Not on file   Stress:     Feeling of Stress : Not on file   Social Connections:     Frequency of Communication with Friends and Family: Not on file    Frequency of Social Gatherings with Friends and Family: Not on file    Attends Christianity Services: Not on file    Active Member of [address] [place] [address] or Organizations: Not on file    Attends Club or Organization Meetings: Not on file    Marital Status: Not on file   Intimate Partner Violence:     Fear of Current or Ex-Partner: Not on file    Emotionally Abused: Not on file    Physically Abused: Not on file    Sexually Abused: Not on file   Housing Stability:     Unable to Pay for Housing in the Last Year: Not on file    Number of [place] in the Last Year: Not on file    Unstable Housing in the Last Year: Not on file         REVIEW OF SYSTEMS:         Review of Systems   Constitutional: Positive for fatigue. Negative for appetite change and unexpected weight change. HENT: Positive for hearing loss (rt ear). Negative for sore throat, trouble swallowing and voice change. Eyes: Positive for visual disturbance (wears glasses). Respiratory: Negative. Negative for cough, choking and shortness of breath. Cardiovascular: Negative. Negative for chest pain and leg swelling. Gastrointestinal: Positive for abdominal distention and abdominal pain. Negative for anal bleeding, blood in stool, constipation, diarrhea, nausea, rectal pain and vomiting.         Denies   Endocrine: Negative. Negative for polydipsia, polyphagia and polyuria. Genitourinary: Negative for difficulty urinating, frequency, hematuria and urgency. Musculoskeletal: Positive for gait problem (in wheel chair). Negative for back pain, joint swelling and myalgias. Skin: Negative. Allergic/Immunologic: Negative. Negative for environmental allergies, food allergies and immunocompromised state. Neurological: Negative for dizziness, tremors, weakness, light-headedness, numbness and headaches. Hematological: Bruises/bleeds easily. Psychiatric/Behavioral: Positive for sleep disturbance. The patient is nervous/anxious. PHYSICAL EXAMINATION:     Vital signs reviewed per the nursing documentation. Temp 97.7 °F (36.5 °C)   Wt 215 lb (97.5 kg)   BMI 30.85 kg/m²   Body mass index is 30.85 kg/m². Physical Exam  Vitals reviewed. Constitutional:       Appearance: Normal appearance. HENT:      Head: Normocephalic and atraumatic. Eyes:      Extraocular Movements: Extraocular movements intact. Conjunctiva/sclera: Conjunctivae normal.   Cardiovascular:      Rate and Rhythm: Normal rate and regular rhythm. Heart sounds: Normal heart sounds. Pulmonary:      Effort: Pulmonary effort is normal.      Breath sounds: Normal breath sounds. Abdominal:      General: Bowel sounds are normal. There is distension. Palpations: Abdomen is soft. There is no mass. Tenderness: There is no abdominal tenderness. There is no guarding. Hernia: No hernia is present. Comments: Abdomen is moderately distended with ascites. No tenderness. Skin:     General: Skin is warm and dry. Neurological:      General: No focal deficit present. Mental Status: He is alert and oriented to person, place, and time.    Psychiatric:         Mood and Affect: Mood normal.         Behavior: Behavior normal.           LABORATORY DATA: Reviewed  Lab Results   Component Value Date    WBC 5.5 03/01/2022    HGB 7.9 (L) 03/01/2022    HCT 25.6 (L) 03/01/2022    MCV 81.5 03/01/2022     03/01/2022     03/01/2022    K 3.3 (L) 03/01/2022     03/01/2022    CO2 27 03/01/2022    BUN 4 (L) 03/01/2022    CREATININE 0.48 (L) 03/01/2022    LABPROT 7.7 04/19/2012    LABALBU 3.2 (L) 03/01/2022    BILITOT 1.15 03/01/2022    ALKPHOS 162 (H) 03/01/2022    AST 32 03/01/2022    ALT 10 03/01/2022    INR 1.3 02/02/2022         Lab Results   Component Value Date    RBC 3.14 (L) 03/01/2022    HGB 7.9 (L) 03/01/2022    MCV 81.5 03/01/2022    MCH 25.1 (L) 03/01/2022    MCHC 30.9 (L) 03/01/2022    RDW 18.0 (H) 03/01/2022    MPV 6.8 03/01/2022    BASOPCT 0 03/01/2022    LYMPHSABS 0.94 (L) 03/01/2022    MONOSABS 0.55 03/01/2022    NEUTROABS 4.01 03/01/2022    EOSABS 0.00 03/01/2022    BASOSABS 0.00 03/01/2022         DIAGNOSTIC TESTING:     No results found. Assessment  No diagnosis found. Plan  Management plan discussed with the patient and wife. He needs abdominal paracentesis and this is advised. To continue present diuretic therapy. Advised to continue salt restricted diet. Other medications reviewed. Advised to see in the next 2 weeks and will repeat his labs prior to his next visit. Thank you for allowing me to participate in the care of Mr. Rebekah Workman. For any further questions please do not hesitate to contact me. I have reviewed and agree with the ROS entered by the MA/LPN. Note is dictated utilizing voice recognition software. Unfortunately this leads to occasional typographical errors.  Please contact our office if you have any questions        Randolph Reid MD,FACP, [facility]  Board Certified in Gastroenterology and [facility]  Office #: [phone]

## 2022-09-23 NOTE — PROGRESS NOTES
Patient tolerated procedure well without distress. 9.4 L of clear, yellow fluid removed. Area cleansed & dry dressing applied. Transport notified to take patient back to [facility]. JERI Mcclure updated.

## 2022-09-23 NOTE — BRIEF OP NOTE
Brief Postoperative Note    Luiza Montes  YOB: 1960  [id number]    Pre-operative Diagnosis: Recurrent ascites; abdominal discomfort    Post-operative Diagnosis: Same    Procedure: US guided paracentesis     Anesthesia: Local    Surgeons/Assistants: Gregg    Estimated Blood Loss: less than 50     Complications: None    Specimens: Was Not Obtained     Electronically signed by Morgan Vera MD on 9/23/2022 at 3:05 PM

## 2022-09-23 NOTE — TELEPHONE ENCOUNTER
Okay for patient to have follow up with GI and oncology. Can follow up with me for transitional care if any questions otherwise okay to keep normal follow up.

## 2022-09-23 NOTE — DISCHARGE INSTR - DIET

## 2022-09-23 NOTE — DISCHARGE INSTRUCTIONS
DISCHARGE INSTRUCTIONS FOR PARACENTESIS    In order to continue your care at home, please follow the instructions below. Medications    Use over-the-counter pain medication as directed on bottle for pain control    Post Procedure Site/Care/Activity  For up to 2 days after the procedure, you may have a small amount of clear fluid coming out of the site where the needle was inserted, especially if you had a lot of fluid removed. You may need to change the bandage on the site. Do not lie totally flat until morning. You can do your normal activities after the procedure, unless instructed differently. Call your doctor or seek immediate medical care if:   You have symptoms of infection, such as increased pain, swelling, warmth, or redness, red streaks or pus. An oral temperature (by mouth) is 101 degrees or higher (fever), chills, fever. You are dizzy or lightheaded, or you feel like you may faint. You have new or worse belly pain. Watch closely for changes in your health, and be sure to contact your doctor if:   Fluid builds up in your belly again. You do not get better as expected. IF YOU CANNOT REACH THE DOCTOR, GO TO THE NEAREST EMERGENCY ROOM OR CALL 911    Phone: Interventional Radiology   [phone]  After hours Radiology   [phone]     Dr Joan Crisostomo performed procedure   9.4 liters  of fluid removed. Administered 3 bottles of albumin.   Port heparinized

## 2022-09-23 NOTE — H&P (VIEW-ONLY)
HISTORY and [id number]       NAME:  Sandy Alfaro  MRN: [id number]   YOB: 1960   Date: 9/23/2022   Age: 60 y.o. Gender: male     COMPLAINT AND PRESENT HISTORY:   Sandy Alfaro is 60 y.o.,  male, undergoing paracentesis for alcoholic cirrhosis per Dr. Maryjane Walker. HPI:PRE-PARACENTESIS QUESTIONNAIRE:   Last paracentesis date:9/15/22  Amount removed:9.3 L  Tolerated well: Yes  Any complications:No  Interval between paracentesis dates:Weekly   Taking medications as prescribed:Yes    Shortness of breath: Yes  ABD distention:Yes  ABD pain:No  Nausea:No  Vomiting:No  Constipation/diarrhea:No  Fever/chills:No  Recent unintended weight gain:pt reports 20lbs since last paracentesis  Leg swelling:No    Review of additional SIGNIFICANT medical hx (see chart for additional detail, including current medications / see ROS for current s/s):   ESOPHAGEAL CA, HEP. C, HX OF ETOH ABUSE,  PORTAL HTN, ESOPHAGEAL VARICES, CIRRHOSIS, HTN, HLD, JIMENES ESOPHAGUS, COVID-19, SOB. EKG 9/12/22:  Normal sinus rhythm  Low voltage QRS  Nonspecific ST and T wave abnormality  Abnormal ECG  When compared with ECG of 05-JUL-2022 14:27,  Previous ECG has undetermined rhythm, needs review    ECHO 12/20/21:  Summary  Left ventricle is normal in size. Mild left ventricular hypertrophy. Global left ventricular systolic function is normal. Estimated LV EF 60-65  %. Left atrium is mildly dilated. No significant valvular regurgitation or stenosis seen. No significant pericardial effusion is seen. Normal aortic root dimension. NPO status: This morning about 0800  Anticoagulation status: Denies any thinners/anticoagulant     Denies personal hx of blood clots. Denies personal hx of MRSA infection. Denies any personal or family hx of previous complications w/anesthesia.   PAST MEDICAL HISTORY     Past Medical History:   Diagnosis Date    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 ([address]     Alcoholic cirrhosis of liver with ascites ([address]     Anemia 04/13/2022    Anxiety     Arthritis     Back pain, chronic     dr. Yan Castro, orthopedic, every 3-4 months, gets steroid injection    Lezama esophagus     BPH (benign prostatic hypertrophy)     Cholelithiasis     Cirrhosis ([address]     COVID-19 12/2020    pt reports he had a positive test while at [facility] in 2020, was asymptomatic    COVID-19 vaccine series completed 5/20/2021, 6/22/2021    Moderna 5/20/2021, 6/22/2021    DDD (degenerative disc disease), lumbar     Depression     Esophageal cancer ([address]     INVASIVE ADENOCARCINOMA ARISING IN TUBULAR ADENOMA WITH HIGH GRADE DYSPLASIA, ASSOCIATED WITH FOCAL INTESTINAL METAPLASIA     Esophageal varices ([address]     Fatty liver     GERD (gastroesophageal reflux disease)     Hep C w/o coma, chronic ([address]     History of alcohol abuse     6-12 beers a day; quit drinking 2019    History of blood transfusion     History of colon polyps 2016    History of tobacco abuse     Robinson (hard of hearing)     Hyperlipidemia     Hypertension     Hyponatremia 07/20/2016    Port-A-Cath in place     right upper chest    Portal hypertension ([address]     Sciatica     Secondary esophageal varices ([address] 06/07/2022    Shortness of breath     Spinal stenosis     Stomach ulcer     hx of    Tubular adenoma of colon 2016, 2018    Vitamin D deficiency     Wears glasses        SURGICAL HISTORY       Past Surgical History:   Procedure Laterality Date    BUNIONECTOMY      twice on right side    BUNIONECTOMY Left     CARPAL TUNNEL RELEASE Right     COLONOSCOPY      at age 35    COLONOSCOPY  10/05/2016    polyps-pathology tubular adenoma, and abnormal looking mucosa right colon-pathology-tubular adenoma    COLONOSCOPY N/A 03/30/2018    COLONOSCOPY POLYPECTOMY COLD BIOPSY performed by Keith Rosenberg MD at [facility]  03/30/2018    Small polyp in the sigmoid colon and excised with biopsy forceps--tubular adenoma    COLONOSCOPY N/A 04/16/2022    COLONOSCOPY POLYPECTOMY performed by Sony Rose MD at [facility] COLON, DIAGNOSTIC      EGD    IR PORT PLACEMENT EQUAL OR GREATER THAN 5 YEARS  04/19/2021    IR PORT PLACEMENT EQUAL OR GREATER THAN 5 YEARS 4/19/2021 [facility] SPECIAL PROCEDURES    KNEE SURGERY Left     cyst removed    NASAL SEPTUM SURGERY      NERVE BLOCK Right 11/23/2020    NERVE BLOCK RIGHT CERVICAL STEROID INJECTION  C3-C6 performed by Sebas Bell MD at [address] [place]  01/04/2016    steroid injection C7 T1    OTHER SURGICAL HISTORY  11/21/2016    Bilateral Lumbar CACHORRO L4-L5 injections    OTHER SURGICAL HISTORY  12/19/2016    lumbar steroid injection    OTHER SURGICAL HISTORY  09/28/2018    BILATERAL L5 CACHORRO (N/A Back)    OTHER SURGICAL HISTORY Right 11/23/2020    cervical injection    PAIN MANAGEMENT PROCEDURE Left 07/09/2020    EPIDURAL STEROID INJECTION LEFT L4 L5 performed by Sebas Bell MD at [address] [facility]  Left 07/20/2020    LEFT L4 L5 EPIDURAL STEROID INJECTION performed by Sebas Bell MD at [address] [place] [address]  Bilateral 08/17/2020    LUMBAR FACET BILATERAL L2-L5 performed by Sebas Bell MD at [address] [facility]  Bilateral 12/07/2020    NERVE BLOCK BILATERAL LUMBAR MEDIAL BRANCH L2-L5 performed by Sebas Bell MD at [address] [place] [address]    [facility] [address] AA&/STRD TFRML EPI LUMBAR/SACRAL 1 LEVEL Bilateral 09/06/2018    BILATERAL L5 CACHORRO performed by Sebas Bell MD at [facility] AA&/STRD TFRML EPI LUMBAR/SACRAL 1 LEVEL N/A 09/28/2018    BILATERAL L5 CACHORRO performed by Sebas Bell MD at [address] [place] [address] N/CARPAL TUNNEL SURG Right 08/29/2017    CARPAL TUNNEL RELEASE RIGHT performed by Luis Carlos Chambers MD at [address] [place] [address] N/CARPAL TUNNEL SURG Left 10/31/2017    CARPAL TUNNEL RELEASE performed by Luis Carlos Chambers MD at [id number] [place] [address] 12/29/2020    EGD BIOPSY performed by Lucian Kristine Klein MD at [address] [place] [address] 2021    EGD BIOPSY and spot marking performed by Luis Cisneros MD at [address] [place] [address] 2021    ENDOSCOPIC ULTRASOUND, EGD performed by Colletta Couch, MD at [place] [address]  2021    EGD DIAGNOSTIC ONLY performed by Colletta Couch, MD at [place] [address] 2021    EGD BIOPSY performed by Luis Cisneros MD at [address] [place] [address] 2022    EGD BIOPSY performed by Luis Cisneros MD at [address] [place] [address] 04/15/2022    EGD ESOPHAGOGASTRODUODENOSCOPY performed by Kira Ambriz MD at [address] [place] [address] N/A 2022    EGD BAND LIGATION performed by Victoriano Odom MD at [address] [place] [address] N/A 2022    EGD ESOPHAGOGASTRODUODENOSCOPY performed by Victoriano Odom MD at [address] [place] [address] N/A 2022    EGD ESOPHAGOGASTRODUODENOSCOPY ENDOSCOPIC APC AT [place] [address] performed by Brandi Cuellar MD at [address] [place] [address] History     Socioeconomic History    Marital status: Single   Tobacco Use    Smoking status: Former     Packs/day: 1.00     Years: 45.00     Pack years: 45.00     Types: Cigarettes     Quit date: 2017     Years since quittin.6    Smokeless tobacco: Never   Vaping Use    Vaping Use: Never used   Substance and Sexual Activity    Alcohol use: Not Currently     Comment: Quit alcohol in 2019- heavier drinking prior to quitting    Drug use: Not Currently     Frequency: 1.0 times per week     Types: Cocaine     Comment: Cocaine- stopped spring 2016    Sexual activity: Yes     Partners: Female   Social History Narrative     in the past, retired     Social Determinants of Health     Financial Resource Strain: Low Risk     Difficulty of Paying Living Expenses: Not hard at all   Food Insecurity: No Food Insecurity    Worried About Running Out of Food in the Last Year: Never true    Ran Out of Food in the Last Year: Never true   Physical Activity: Inactive    Days of Exercise per Week: 0 days    Minutes of Exercise per Session: 0 min       REVIEW OF SYSTEMS    No Known Allergies    Current Outpatient Medications on File Prior to Encounter   Medication Sig Dispense Refill    nadolol (CORGARD) 20 MG tablet take 1 tablet by mouth once daily 30 tablet 2    midodrine (PROAMATINE) 5 MG tablet Take 2 tablets by mouth in the morning and 2 tablets at noon and 2 tablets before bedtime. 90 tablet 3    furosemide (LASIX) 20 MG tablet Take 1 tablet by mouth 2 times daily 60 tablet 3    spironolactone (ALDACTONE) 100 MG tablet Take 1 tablet by mouth every evening (Patient taking differently: Take 100 mg by mouth 3 times daily) 30 tablet 1    ondansetron (ZOFRAN-ODT) 4 MG disintegrating tablet dissolve 1 tablet by mouth every 8 hours if needed for nausea and vomiting 10 tablet 0    lactulose (CHRONULAC) 10 GM/15ML solution take 30 milliliters by mouth three times a day 473 mL 1    FLUoxetine (PROZAC) 20 MG capsule Take 1 capsule by mouth daily 30 capsule 3    atorvastatin (LIPITOR) 20 MG tablet Take 1 tablet by mouth nightly 30 tablet 3    ferrous sulfate (IRON 325) 325 (65 Fe) MG tablet Take 1 tablet by mouth 2 times daily 60 tablet 5     No current facility-administered medications on file prior to encounter. Review of Systems   Constitutional:  Negative for chills and fever. HENT:  Negative for congestion, ear pain, postnasal drip, rhinorrhea, sore throat and trouble swallowing. Respiratory:  Positive for shortness of breath. Negative for cough and wheezing. Cardiovascular:  Negative for chest pain, palpitations and leg swelling. Gastrointestinal:  Positive for abdominal distention.  Negative for abdominal pain, anal bleeding, blood in stool, constipation, diarrhea, nausea and vomiting. Genitourinary:  Negative for dysuria and frequency. Skin:  Negative for rash. Neurological:  Negative for dizziness and headaches. Hematological:  Does not bruise/bleed easily. GENERAL PHYSICAL EXAM     Vitals: See nurse flowsheet    GENERAL APPEARANCE:   Edyta Fraser is 60 y.o.,  male, not obese, nourished, conscious, alert. Does not appear to be in any distress or pain at this time. Physical Exam  Constitutional:       General: He is not in acute distress. Appearance: He is obese. HENT:      Head: Normocephalic. Nose: Nose normal.      Mouth/Throat:      Mouth: Mucous membranes are dry. Pharynx: Oropharynx is clear. Comments: Multiple dental caries   Eyes:      Extraocular Movements: Extraocular movements intact. Pupils: Pupils are equal, round, and reactive to light. Comments: Wearing glasses   Cardiovascular:      Rate and Rhythm: Normal rate and regular rhythm. Pulmonary:      Effort: Pulmonary effort is normal.      Breath sounds: Normal breath sounds. Abdominal:      General: Abdomen is protuberant. Bowel sounds are normal. There is distension. Comments: ascites   Musculoskeletal:         General: Normal range of motion. Cervical back: Normal range of motion. Skin:     General: Skin is warm and dry. Findings: No bruising. Neurological:      General: No focal deficit present. Mental Status: He is alert and oriented to person, place, and time. Mental status is at baseline.        RECENT LAB WORK     Lab Results   Component Value Date     (L) 09/19/2022    K 3.9 09/19/2022     09/19/2022    CO2 24 09/19/2022    BUN 9 09/19/2022    CREATININE 0.69 (L) 09/19/2022    GLUCOSE 96 09/19/2022    CALCIUM 8.4 (L) 09/19/2022    PROT 5.5 (L) 09/19/2022    LABALBU 3.2 (L) 09/19/2022    BILITOT 1.7 (H) 09/19/2022    ALKPHOS 159 (H) 09/19/2022    AST 37 09/19/2022    ALT 14 09/19/2022    LABGLOM >60 09/19/2022    GFRAA >60 09/19/2022    GLOB NOT REPORTED 08/19/2021     Lab Results   Component Value Date    WBC 6.6 09/19/2022    HGB 10.1 (L) 09/19/2022    HCT 31.5 (L) 09/19/2022    MCV 84.4 09/19/2022     09/19/2022     PROVISIONAL DIAGNOSES / PROCEDURE:    Paracentesis     Alcoholic cirrhosis   Patient Active Problem List    Diagnosis Date Noted    GI bleed 09/13/2022    Secondary esophageal varices ([address] 06/07/2022    Esophageal polyp 06/07/2022    Drop in hemoglobin 06/03/2022    Portal hypertension (HCC)     Shortness of breath     Ascites 04/26/2022    Acute kidney failure, unspecified ([address] 04/21/2022    Muscle weakness (generalized) 07/28/2016    Other abnormalities of gait and mobility 07/28/2016    Anemia 04/13/2022    Acute kidney injury ([address] 04/13/2022    Esophageal adenocarcinoma ([address]     Low hemoglobin 12/20/2021    Symptomatic anemia, microcytic, acute 12/20/2021    Hypotension 12/20/2021    Former smoker, 50+ pack years, quit 2016 12/20/2021    HLD (hyperlipidemia) 12/20/2021    Abnormal findings on diagnostic imaging of spine 12/14/2021    Cervical spinal stenosis 12/14/2021    Spinal stenosis of lumbar region with neurogenic claudication 12/14/2021    Severe comorbid illness 11/30/2021    Gait instability 11/30/2021    Current smoker 04/05/2021    COVID-19 02/23/2021    Anxiety 02/23/2021    Malignant neoplasm of lower third of esophagus ([address]     Hypocalcemia 12/26/2020    Hypophosphatemia 12/26/2020    Gastrointestinal hemorrhage with melena     Alcohol abuse     Altered mental status     Acute gastrointestinal bleeding 12/23/2020    Thrombocytopenia ([address] 12/23/2020    Hepatitis C virus infection resolved after antiviral drug therapy 12/23/2020    Cervical facet syndrome 11/23/2020    Lumbar facet arthropathy 08/17/2020    Elevated LFTs 08/12/2020    Seasonal allergies 08/12/2020    S/P epidural steroid injection 08/05/2020    Essential hypertension 04/24/2019    Recurrent major depressive disorder in partial remission ([address] 04/24/2019    Pure hypercholesterolemia 02/04/2019    Hypokalemia 02/04/2019    Vitamin D deficiency 09/20/2017    History of hepatitis C 09/11/2017    Ganglion cyst 05/31/2017    Carpal tunnel syndrome of right wrist 05/31/2017    Tinnitus 03/23/2017    Eustachian tube dysfunction 03/23/2017    Lumbar radiculitis 11/08/2016    Lumbar disc herniation 11/08/2016    Gynecomastia, male 10/26/2016    Depression 10/13/2016    Vertebrogenic low back pain 10/06/2016    DDD (degenerative disc disease), lumbar 10/06/2016    Hepatic cirrhosis ([address] 09/15/2016    Psychophysiologic insomnia 09/14/2016    Cirrhosis ([address]     Hep C w/o coma, chronic (HCC)     Fatty liver     Calculus of gallbladder without cholecystitis 08/10/2016    Chronic viral hepatitis B without delta agent and without coma ([address] 07/22/2016    Hypomagnesemia     Alcohol withdrawal syndrome without complication ([address] 22/25/0973    Cervical radicular pain 01/04/2016    Tubular adenoma of colon 01/01/2016    History of colon polyps 01/01/2016    Back pain, chronic 04/19/2012    Hearing difficulty 04/19/2012    GERD (gastroesophageal reflux disease) 04/19/2012           JAMAR BASS, MASSIMO - CNP on 9/23/2022 at 12:04 PM

## 2022-09-24 NOTE — PROGRESS NOTES
ANTEPARTUM PROGRESS NOTE    HISTORY OF PRESENT ILLNESS    Sia Gordon is a 28 year old  female at 29w0d, estimated date of delivery:  12/10/2022 admitted with chronic placental abruption and PPROM.     Patient is doing well without concerns. She denies any vaginal bleeding since Thursday. Patient denies any pain, contractions. Notes positive fetal movement x 2.    PHYSICAL EXAM  Visit Vitals  BP 95/52   Pulse 79   Temp 98.4 °F (36.9 °C) (Oral)   Resp 15   Ht 5' 2\" (1.575 m)   Wt 80.6 kg   LMP 2022 (Exact Date)   SpO2 99%   BMI 32.48 kg/m²      General:  alert, normal affect, no apparent distress.    STERILE VAGINAL EXAMINATION .5/0/-4/Medium    LABS    Recent Labs   Lab 22  0648 22  0807   HGB 9.7* 9.9*   HCT 29.3* 28.6*   WBC 7.4 8.0    218     LAST ULTRASOUND    vertex/breech; BPP 8/8, 6/8 (-2 for fluid), dopplers normal   9/15 last growth US  Twin A AC 71%, EFW 54%  Twin B AC 72% EFW 48%    ASSESSMENT/PLAN  1. Chronic placental abruption, PPROM with di/di twin gestation:   1. Continue inpatient management until delivery at 34 weeks gestation   2. Latency antibiotic therapy   3. S/P BTM -, Mg   4. IEFM   5. Continue twice weekly  testing, serial growth US   6. S/P MFM consultation with the following recommendations:    1) Continue inpatient monitoring until delivery.  2) Twice weekly fetal monitoring with biophysical profile ultrasounds and weekly MCA Doppler.  3) Maintain maternal hemoglobin levels greater than 9 g/dL  4) Limited maternal echocardiogram to evaluate for aortic stenosis or further aortic root dilation.  5) Cardiology consultation.  6) Prompt delivery is indicated for massive abruption, maternal compromise, or fetal well-being  7) Delivery at 34 weeks gestation with earlier delivery as clinically indicated.     7. NICU consultation   8. Repeat CBC stable; goal to maintain above 9   9. Planning C/S  2. Bicuspid aortic valve:   1. Plan for repeat  Sandostatin and protonix are not compatable. Patient only has one IV access on his foot. He asked if we could access his chest port and RN obtained orders for this. The hospital doesn't carry his length of the IV to access his chest port so he is refusing to have chest port accessed and refusing to let RN attempt a peripheral IV. GI to be notified. echo (TTE) approximately 1 week prior to C/S to again evaluate aorta. If there are stable findings, no additional evaluation is necessitated; full consultation can be performed if pt were to become symptomatic or should there be abnormal findings on the echocardiogram  3. CHTN:   1. Continue Labetalol 100 mg PO BID  4. Rubella, varicella non immune status:   1. Vaccinations postpartum     Rekha Hernandez,   9/24/2022

## 2022-09-28 ENCOUNTER — HOSPITAL ENCOUNTER (OUTPATIENT)
Age: 62
Discharge: HOME | End: 2022-09-28
Payer: MEDICARE

## 2022-09-28 DIAGNOSIS — C15.5 MALIGNANT NEOPLASM OF LOWER THIRD OF ESOPHAGUS (HCC): ICD-10-CM

## 2022-09-28 DIAGNOSIS — R97.8 OTHER ABNORMAL TUMOR MARKERS: ICD-10-CM

## 2022-09-28 LAB
ABSOLUTE EOS #: 0.1 K/UL (ref 0–0.4)
ABSOLUTE LYMPH #: 0.5 K/UL (ref 1–4.8)
ABSOLUTE MONO #: 0.7 K/UL (ref 0.1–1.3)
ALBUMIN SERPL-MCNC: 3.3 G/DL (ref 3.5–5.2)
ALP BLD-CCNC: 284 U/L (ref 40–129)
ALT SERPL-CCNC: 30 U/L (ref 5–41)
ANION GAP SERPL CALCULATED.3IONS-SCNC: 11 MMOL/L (ref 9–17)
AST SERPL-CCNC: 65 U/L
BASOPHILS # BLD: 1 % (ref 0–2)
BASOPHILS ABSOLUTE: 0 K/UL (ref 0–0.2)
BILIRUB SERPL-MCNC: 1.7 MG/DL (ref 0.3–1.2)
BUN BLDV-MCNC: 8 MG/DL (ref 8–23)
CA 19-9: 36 U/ML (ref 0–35)
CALCIUM SERPL-MCNC: 9.1 MG/DL (ref 8.6–10.4)
CHLORIDE BLD-SCNC: 94 MMOL/L (ref 98–107)
CO2: 23 MMOL/L (ref 20–31)
CREAT SERPL-MCNC: 0.56 MG/DL (ref 0.7–1.2)
EOSINOPHILS RELATIVE PERCENT: 1 % (ref 0–4)
FERRITIN: 90 NG/ML (ref 30–400)
GFR AFRICAN AMERICAN: >60 ML/MIN
GFR NON-AFRICAN AMERICAN: >60 ML/MIN
GFR SERPL CREATININE-BSD FRML MDRD: ABNORMAL ML/MIN/{1.73_M2}
GLUCOSE BLD-MCNC: 95 MG/DL (ref 70–99)
HCT VFR BLD CALC: 31.3 % (ref 41–53)
HCT VFR BLD CALC: 31.5 % (ref 41–53)
HEMOGLOBIN: 10.1 G/DL (ref 13.5–17.5)
HEMOGLOBIN: 10.2 G/DL (ref 13.5–17.5)
IRON SATURATION: 19 % (ref 20–55)
IRON: 32 UG/DL (ref 59–158)
LYMPHOCYTES # BLD: 8 % (ref 24–44)
MCH RBC QN AUTO: 27.4 PG (ref 26–34)
MCHC RBC AUTO-ENTMCNC: 32.4 G/DL (ref 31–37)
MCV RBC AUTO: 84.4 FL (ref 80–100)
MONOCYTES # BLD: 11 % (ref 1–7)
PDW BLD-RTO: 17.9 % (ref 11.5–14.9)
PLATELET # BLD: 263 K/UL (ref 150–450)
PMV BLD AUTO: 6.5 FL (ref 6–12)
POTASSIUM SERPL-SCNC: 4.3 MMOL/L (ref 3.7–5.3)
RBC # BLD: 3.73 M/UL (ref 4.5–5.9)
SEG NEUTROPHILS: 79 % (ref 36–66)
SEGMENTED NEUTROPHILS ABSOLUTE COUNT: 5.6 K/UL (ref 1.3–9.1)
SODIUM BLD-SCNC: 128 MMOL/L (ref 135–144)
TOTAL IRON BINDING CAPACITY: 170 UG/DL (ref 250–450)
TOTAL PROTEIN: 6 G/DL (ref 6.4–8.3)
UNSATURATED IRON BINDING CAPACITY: 138 UG/DL (ref 112–347)
WBC # BLD: 6.9 K/UL (ref 3.5–11)

## 2022-09-28 PROCEDURE — 85014 HEMATOCRIT: CPT

## 2022-09-28 PROCEDURE — 83550 IRON BINDING TEST: CPT

## 2022-09-28 PROCEDURE — 86301 IMMUNOASSAY TUMOR CA 19-9: CPT

## 2022-09-28 PROCEDURE — 83540 ASSAY OF IRON: CPT

## 2022-09-28 PROCEDURE — 80053 COMPREHEN METABOLIC PANEL: CPT

## 2022-09-28 PROCEDURE — 36415 COLL VENOUS BLD VENIPUNCTURE: CPT

## 2022-09-28 PROCEDURE — 85025 COMPLETE CBC W/AUTO DIFF WBC: CPT

## 2022-09-28 PROCEDURE — 85018 HEMOGLOBIN: CPT

## 2022-09-28 PROCEDURE — 82728 ASSAY OF FERRITIN: CPT

## 2022-09-28 NOTE — DISCHARGE SUMMARY
[x] [facility] ([facility]) @ [facility]  [address] [facility]  [place], [address] [facility]  Phone [phone]  Fax [phone]    Physical Therapy Discharge Note    Date: 2022      Patient: Ba Hansen  :   MRN: [id number]    Physician: VASU Nunez                                  Insurance: [address] [place] [address] (Darrall Peyer required after evaluation)   Medical Diagnosis:   K70.31 (RHO-18-TP) - Alcoholic cirrhosis of liver with ascites ([facility]   M48.062 (ICD-10-CM) - Spinal stenosis of lumbar region with neurogenic claudication   Z91.81 (ICD-10-CM) - At high risk for falls      Rehab Codes: M54.5   Onset Date: chronic              Next 's appt. : 23  Visit Count: 3/12                                Cancel/No Show: 3/1    Date of initial visit: 22                      [x] Patient has 2 or more no shows/cancels, is discontinued per our policy. Unable to attend due to other health issues. It Is My Understanding That The:  [] Patient returned to work. [] Patient demonstrated improved level of function. [] Patient returned to previous functional level.   [x] Patient's current functional status is unknown due to no-shows  [] Other:     Recommendations/Comments:                   Treatment Included:     [x] Therapeutic Exercise   86059  [] Iontophoresis: 4 mg/mL Dexamethasone Sodium Phosphate  mAmin  46508   [] Therapeutic Activity  34603 [] Vasopneumatic cold with compression  27113    [] Gait Training   66878 [] Ultrasound   33148   [] Neuromuscular Re-education  38676 [] Electrical Stimulation Unattended  86694   [] Manual Therapy  90983 [] Electrical Stimulation Attended  36860   [x] Instruction in HEP  [] Lumbar/Cervical Traction  01941   [] Aquatic Therapy   42040 [] Cold/hotpack    [] Massage   40701      [] Dry Needling, 1 or 2 muscles  63267   [] Biofeedback, first 15 minutes   51855  [] Biofeedback, additional 15 minutes   20744 [] Dry Needling, 3 or more muscles  68924                If you have any questions or concerns regarding this patient's care, please contact us.    Thank you for your referral.      Electronically signed by: Susan Allen PT

## 2022-09-29 ENCOUNTER — OFFICE VISIT (OUTPATIENT)
Dept: GASTROENTEROLOGY | Age: 62
End: 2022-09-29
Payer: MEDICARE

## 2022-09-29 VITALS
DIASTOLIC BLOOD PRESSURE: 86 MMHG | WEIGHT: 193 LBS | BODY MASS INDEX: 27.69 KG/M2 | SYSTOLIC BLOOD PRESSURE: 129 MMHG | HEART RATE: 91 BPM

## 2022-09-29 DIAGNOSIS — K22.711 BARRETT'S ESOPHAGUS WITH HIGH GRADE DYSPLASIA: ICD-10-CM

## 2022-09-29 DIAGNOSIS — D64.9 ANEMIA, UNSPECIFIED TYPE: ICD-10-CM

## 2022-09-29 DIAGNOSIS — E87.1 HYPONATREMIA: ICD-10-CM

## 2022-09-29 DIAGNOSIS — B18.2 HEP C W/O COMA, CHRONIC (HCC): ICD-10-CM

## 2022-09-29 DIAGNOSIS — K76.6 PORTAL HYPERTENSIVE GASTROPATHY (HCC): ICD-10-CM

## 2022-09-29 DIAGNOSIS — K70.31 ASCITES DUE TO ALCOHOLIC CIRRHOSIS (HCC): Primary | ICD-10-CM

## 2022-09-29 DIAGNOSIS — K31.89 PORTAL HYPERTENSIVE GASTROPATHY (HCC): ICD-10-CM

## 2022-09-29 PROCEDURE — 99214 OFFICE O/P EST MOD 30 MIN: CPT | Performed by: NURSE PRACTITIONER

## 2022-09-29 ASSESSMENT — ENCOUNTER SYMPTOMS
ABDOMINAL DISTENTION: 1
RESPIRATORY NEGATIVE: 1
BACK PAIN: 1
ALLERGIC/IMMUNOLOGIC NEGATIVE: 1

## 2022-09-29 NOTE — PROGRESS NOTES
GI CLINIC FOLLOW UP    INTERVAL HISTORY:   No referring provider defined for this encounter. No chief complaint on file. HISTORY OF PRESENT ILLNESS:     Patient being seen for hospital follow-up. Patient was admitted 9/12/2022 for management of GI bleed. Hemoglobin was 6.6 on admission. Subsequently patient had EGD. No active bleeding noted. Patient did have multiple angiectasia's in the gastric fundus and EG junction. APC performed. Noted post banding scarring in the lower esophagus. No convincing evidence of residual esophageal varices seen. No gastric varices seen. Normal first and second part of the duodenum. Following discharge she has been doing fairly well. Does have recurrent ascites. States he is following 2 g sodium diet. Mild bilateral lower extreme edema. Is currently on Lasix 20 mg daily, Aldactone 100 mg daily. Plans for paracentesis tomorrow with albumin. Labs reviewed. Serum sodium 128  Ongoing phosphatase 284, ALT 30, AST 65, bilirubin 1.7    Hemoglobin improved to 10.2. Denies any melena, hematochezia. Bowel movements satisfactory. Past Medical,Family, and Social History reviewed and does contribute to the patient presentingcondition. Patient's PMH/PSH,SH,PSYCH Hx, MEDs, ALLERGIES, and ROS were all reviewed and updated in the appropriate sections.     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 Lili Kelley, orthopedic, every 3-4 months, gets steroid injection    Lezama esophagus     BPH (benign prostatic hypertrophy)     Cholelithiasis     Cirrhosis ([address]     COVID-19 12/2020    pt reports he had a positive test while at [facility] in 2020, was asymptomatic    COVID-19 vaccine series completed 5/20/2021, 6/22/2021    Moderna 5/20/2021, 6/22/2021    DDD (degenerative disc disease), lumbar     Depression     Esophageal cancer ([facility]     INVASIVE ADENOCARCINOMA ARISING IN TUBULAR ADENOMA WITH HIGH GRADE DYSPLASIA, ASSOCIATED WITH FOCAL INTESTINAL METAPLASIA     Esophageal varices (HCC)     Fatty liver     GERD (gastroesophageal reflux disease)     Hep C w/o coma, chronic ([address]     History of alcohol abuse     6-12 beers a day; quit drinking 2019    History of blood transfusion     History of colon polyps 2016    History of tobacco abuse     [place] (hard of hearing)     Hyperlipidemia     Hypertension     Hyponatremia 07/20/2016    Port-A-Cath in place     right upper chest    Portal hypertension ([facility]     Sciatica     Secondary esophageal varices ([facility] 06/07/2022    Shortness of breath     Spinal stenosis     Stomach ulcer     hx of    Tubular adenoma of colon 2016, 2018    Vitamin D deficiency     Wears glasses        Past Surgical History:   Procedure Laterality Date    BUNIONECTOMY      twice on right side    BUNIONECTOMY Left     CARPAL TUNNEL RELEASE Right     COLONOSCOPY      at age 35    COLONOSCOPY  10/05/2016    polyps-pathology tubular adenoma, and abnormal looking mucosa right colon-pathology-tubular adenoma    COLONOSCOPY N/A 03/30/2018    COLONOSCOPY POLYPECTOMY COLD BIOPSY performed by Jennifer Barlow MD at [place] [id number]  03/30/2018    Small polyp in the sigmoid colon and excised with biopsy forceps--tubular adenoma    COLONOSCOPY N/A 04/16/2022    COLONOSCOPY POLYPECTOMY performed by Jennifer Barlow MD at [address] [place] [address], [place], DIAGNOSTIC      EGD    IR PORT PLACEMENT EQUAL OR GREATER THAN 5 YEARS  04/19/2021    IR PORT PLACEMENT EQUAL OR GREATER THAN 5 YEARS 4/19/2021 [facility] PROCED[facility]    KNEE SURGERY Left     cyst removed    NASAL SEPTUM SURGERY      NERVE BLOCK Right 11/23/2020    NERVE BLOCK RIGHT CERVICAL STEROID INJECTION  C3-C6 performed by Guanaco Chambers MD at [address]  01/04/2016    steroid injection C7 T1 [facility]    UPPER GASTROINTESTINAL ENDOSCOPY N/A 01/21/2022    EGD BIOPSY performed by Dahlia Hawkins MD at [address] [place] [address] 04/15/2022    EGD ESOPHAGOGASTRODUODENOSCOPY performed by Chayito North MD at [place] [address] 06/06/2022    EGD BAND LIGATION performed by Cristóbal Loving MD at [facility] N/A 06/09/2022    EGD ESOPHAGOGASTRODUODENOSCOPY performed by Cristóbal Loving MD at [address] [place] [address] N/A 9/14/2022    EGD ESOPHAGOGASTRODUODENOSCOPY ENDOSCOPIC APC AT [facility] 39 performed by Marichuy Mariee MD at [facility]. 285:    Current Outpatient Medications:     nadolol (CORGARD) 20 MG tablet, take 1 tablet by mouth once daily, Disp: 30 tablet, Rfl: 2    midodrine (PROAMATINE) 5 MG tablet, Take 2 tablets by mouth in the morning and 2 tablets at noon and 2 tablets before bedtime. , Disp: 90 tablet, Rfl: 3    furosemide (LASIX) 20 MG tablet, Take 1 tablet by mouth 2 times daily, Disp: 60 tablet, Rfl: 3    spironolactone (ALDACTONE) 100 MG tablet, Take 1 tablet by mouth every evening (Patient taking differently: Take 100 mg by mouth 3 times daily), Disp: 30 tablet, Rfl: 1    ondansetron (ZOFRAN-ODT) 4 MG disintegrating tablet, dissolve 1 tablet by mouth every 8 hours if needed for nausea and vomiting, Disp: 10 tablet, Rfl: 0    lactulose (CHRONULAC) 10 GM/15ML solution, take 30 milliliters by mouth three times a day, Disp: 473 mL, Rfl: 1    FLUoxetine (PROZAC) 20 MG capsule, Take 1 capsule by mouth daily, Disp: 30 capsule, Rfl: 3    atorvastatin (LIPITOR) 20 MG tablet, Take 1 tablet by mouth nightly, Disp: 30 tablet, Rfl: 3    ferrous sulfate (IRON 325) 325 (65 Fe) MG tablet, Take 1 tablet by mouth 2 times daily, Disp: 60 tablet, Rfl: 5    ALLERGIES:   No Known Allergies    FAMILY HISTORY:       Problem Relation Age of Onset    Cancer Mother         pancreatic    Cancer Father         bone    Diabetes Sister     Asthma Brother          SOCIAL HISTORY:   Social History     Socioeconomic History    Marital status: Single     Spouse name: Not on file    Number of children: Not on file    Years of education: Not on file    Highest education level: Not on file   Occupational History    Not on file   Tobacco Use    Smoking status: Former     Packs/day: 1.00     Years: 45.00     Pack years: 45.00     Types: Cigarettes     Quit date: 2017     Years since quittin.7    Smokeless tobacco: Never   Vaping Use    Vaping Use: Never used   Substance and Sexual Activity    Alcohol use: Not Currently     Comment: Quit alcohol in 2019- heavier drinking prior to quitting    Drug use: Not Currently     Frequency: 1.0 times per week     Types: Cocaine     Comment: Cocaine- stopped spring 2016    Sexual activity: Yes     Partners: Female   Other Topics Concern    Not on file   Social History Narrative     in the past, retired     Social Determinants of Health     Financial Resource Strain: Low Risk     Difficulty of Paying Living Expenses: Not hard at all   Food Insecurity: No Food Insecurity    Worried About [address] [employer] [address] [employer] in the Last Year: Never true    [address] [employer] in the Last Year: Never true   Transportation Needs: Not on file   Physical Activity: Inactive    Days of Exercise per Week: 0 days    Minutes of Exercise per Session: 0 min   Stress: Not on file   Social Connections: Not on file   Intimate Partner Violence: Not on file   Housing Stability: Not on file       REVIEW OF SYSTEMS: A 12-point review of systemswas obtained and pertinent positives and negatives were enumerated above in the history of present illness. All other reviewed systems / symptoms were negative. Review of Systems   Constitutional: Negative. HENT: Negative. Eyes:  Positive for visual disturbance (glasses ). Respiratory: Negative.      Cardiovascular: Negative. Gastrointestinal:  Positive for abdominal distention. Endocrine: Negative. Genitourinary: Negative. Musculoskeletal:  Positive for back pain. Skin: Negative. Allergic/Immunologic: Negative. Neurological: Negative. Hematological:  Bruises/bleeds easily. Psychiatric/Behavioral: Negative. PHYSICAL EXAMINATION: Vital signs reviewed per the nursing documentation. There were no vitals taken for this visit. There is no height or weight on file to calculate BMI. Physical Exam  Constitutional:       Appearance: Normal appearance. Eyes:      General: No scleral icterus. Pupils: Pupils are equal, round, and reactive to light. Cardiovascular:      Rate and Rhythm: Normal rate and regular rhythm. Heart sounds: Normal heart sounds. Pulmonary:      Effort: Pulmonary effort is normal.      Breath sounds: Normal breath sounds. Abdominal:      General: Bowel sounds are normal. There is no distension. Palpations: Abdomen is soft. There is no mass. Tenderness: There is no abdominal tenderness. There is no guarding. Comments: Ascites     Skin:     General: Skin is warm and dry. Coloration: Skin is not jaundiced. Neurological:      Mental Status: He is alert and oriented to person, place, and time. Mental status is at baseline.          LABORATORY DATA: Reviewed  Lab Results   Component Value Date    WBC 6.9 09/28/2022    HGB 10.2 (L) 09/28/2022    HCT 31.5 (L) 09/28/2022    MCV 84.4 09/28/2022     09/28/2022     (L) 09/28/2022    K 4.3 09/28/2022    CL 94 (L) 09/28/2022    CO2 23 09/28/2022    BUN 8 09/28/2022    CREATININE 0.56 (L) 09/28/2022    LABPROT 7.7 04/19/2012    LABALBU 3.3 (L) 09/28/2022    BILITOT 1.7 (H) 09/28/2022    ALKPHOS 284 (H) 09/28/2022    AST 65 (H) 09/28/2022    ALT 30 09/28/2022    INR 1.4 09/14/2022         Lab Results   Component Value Date    RBC 3.73 (L) 09/28/2022    HGB 10.2 (L) 09/28/2022    MCV 84.4 09/28/2022    MCH 27.4 09/28/2022    MCHC 32.4 09/28/2022    RDW 17.9 (H) 09/28/2022    MPV 6.5 09/28/2022    BASOPCT 1 09/28/2022    LYMPHSABS 0.50 (L) 09/28/2022    MONOSABS 0.70 09/28/2022    NEUTROABS 5.60 09/28/2022    EOSABS 0.10 09/28/2022    BASOSABS 0.00 09/28/2022         DIAGNOSTIC TESTING:     XR CHEST PORTABLE    Result Date: 9/12/2022  EXAMINATION: ONE XRAY VIEW OF THE CHEST 9/12/2022 6:53 pm COMPARISON: 04/26/2022 HISTORY: ORDERING SYSTEM PROVIDED HISTORY: weakness TECHNOLOGIST PROVIDED HISTORY: weakness Reason for Exam: fatigue, SOB, dizziness. patient complains of sharp pain on anterior left side of chest. patient stated he has low hemoglobin FINDINGS: The cardial-pericardial silhouette is unremarkable in appearance. The lungs are clear. No pneumothorax is found. No free air is seen. No acute bony abnormality. Mery catheter unchanged in position. Unremarkable portable chest radiograph. IR US GUIDED PARACENTESIS    Result Date: 9/23/2022  PROCEDURE: PARACENTESIS WITH IMAGE GUIDANCE US ABDOMEN LIMITED 9/23/2022 HISTORY: ORDERING SYSTEM PROVIDED HISTORY: Alcoholic cirrhosis of liver with ascites ([facility] TECHNOLOGIST PROVIDED HISTORY: Weekly due o amount drained every 2 weeks TECHNIQUE: Informed consent was obtained after a detailed explanation of the procedure including risks, benefits, and alternatives. Universal protocol was followed. A limited ultrasound of the abdomen was performed. The right abdomen was prepped and draped in sterile fashion and local anesthesia was achieved with lidocaine. An 5 Angolan needle sheath was advanced into ascites and paracentesis was performed. The patient tolerated the procedure well. FINDINGS: Limited ultrasound of the abdomen demonstrates ascites. A total of 9.4 L was removed. Successful ultrasound-guided paracentesis.      IR US GUIDED PARACENTESIS    Result Date: 9/16/2022  PROCEDURE: PARACENTESIS WITHOUT IMAGE GUIDANCE US ABDOMEN LIMITED 9/15/2022 HISTORY: ORDERING SYSTEM PROVIDED HISTORY: Ascites TECHNOLOGIST PROVIDED HISTORY: Ascitis TECHNIQUE: Informed consent was obtained after a explanation of the procedure including risks. Universal protocol was followed. A limited ultrasound of the abdomen was performed. The right abdomen was prepped and draped in sterile fashion, and local anesthesia was achieved with 1% lidocaine. A 5 Armenian needle sheath was advanced into the ascites under direct ultrasound guidance (a sterile probe cover and gel were used), and paracentesis was performed. The initially placed catheter stopped draining part way through the paracentesis, so a 2nd needle catheter placement was performed after anesthetization with 1% lidocaine. A total of 9.3 L of yellow fluid was aspirated. The patient tolerated the procedure well without immediately apparent complication. FINDINGS: Initial limited abdominal ultrasound demonstrated a large amount of ascites. Successful paracentesis     IR US GUIDED PARACENTESIS    Result Date: 9/9/2022  PROCEDURE: PARACENTESIS WITH IMAGE GUIDANCE US ABDOMEN LIMITED 9/9/2022 HISTORY: ORDERING SYSTEM PROVIDED HISTORY: Alcoholic cirrhosis of liver with ascites ([address] TECHNOLOGIST PROVIDED HISTORY: Weekly due o amount drained every 2 weeks TECHNIQUE: Informed consent was obtained after a detailed explanation of the procedure including risks, benefits, and alternatives. Universal protocol was followed. Preprocedure ultrasound shows a dominant fluid pocket in the left lowerquadrant. Using ultrasound guidance (image attached to the medical record), the fluid pocket was accessed with a 5 [place] Lorrie Yueh needle with aspiration of non turbid yellow fluid. Vacuum bottle paracentesis was performed and approximately 7.9 L was removed. Post procedural ultrasound demonstrated no residual fluid. the patient tolerated the procedure well and left the department in good condition. Dr. Jeanne Calles was present.  FINDINGS: Limited with Auto Differential    Comprehensive Metabolic Panel      5. Portal hypertensive gastropathy (HCC)  CBC with Auto Differential    Comprehensive Metabolic Panel      6. Hyponatremia  CBC with Auto Differential    Comprehensive Metabolic Panel        At present patient appears stable. Mild hyponatremia. Continue midodrine. Hold beta-blocker x3 days. Plans for paracentesis tomorrow with albumin. Continue diuretics. Weekly CBC, CMP. Repeat EGD scheduled 10/19. RTO following endoscopy. The Endoscopic procedure was explained to the patient in detail  The prep and NPO were explained  All the Risks, Benefits, and Alternatives were explained  Risk of Bleeding, Perforation and Cardio Respiratory risks were explained  his questions were answered  The procedure has been scheduled with the  in the office  Patient was asked to give us a call for any questions  The patient has verbalized understanding and agreement to this plan. The patient was counseled at length about the risks of mary Covid-19 during their perioperative period and any recovery window from their procedure. The patient was made aware that mary Covid-19  may worsen their prognosis for recovering from their procedure  and lend to a higher morbidity and/or mortality risk. All material risks, benefits, and reasonable alternatives including postponing the procedure were discussed. The patient does wish to proceed with the procedure at this time. Thank you for allowing me to participate in the care of Mr. Juan Diego Kaufman. For any further questions please do not hesitate to contact me. I have reviewed and agree with the ROS entered by the MA/TODD.          ANNA Zuluaga    [facility]  Office #: [phone]

## 2022-09-30 ENCOUNTER — HOSPITAL ENCOUNTER (OUTPATIENT)
Dept: INTERVENTIONAL RADIOLOGY/VASCULAR | Age: 62
Discharge: HOME | End: 2022-10-02
Payer: MEDICARE

## 2022-09-30 VITALS
HEART RATE: 102 BPM | HEIGHT: 70 IN | BODY MASS INDEX: 28.77 KG/M2 | DIASTOLIC BLOOD PRESSURE: 70 MMHG | WEIGHT: 201 LBS | OXYGEN SATURATION: 100 % | RESPIRATION RATE: 18 BRPM | SYSTOLIC BLOOD PRESSURE: 118 MMHG | TEMPERATURE: 97.9 F

## 2022-09-30 DIAGNOSIS — K70.31 ALCOHOLIC CIRRHOSIS OF LIVER WITH ASCITES (HCC): ICD-10-CM

## 2022-09-30 PROCEDURE — 2709999900 IR US GUIDED PARACENTESIS

## 2022-09-30 PROCEDURE — 49083 ABD PARACENTESIS W/IMAGING: CPT

## 2022-09-30 PROCEDURE — 7100000010 HC PHASE II RECOVERY - FIRST 15 MIN

## 2022-09-30 PROCEDURE — 6360000002 HC RX W HCPCS: Performed by: INTERNAL MEDICINE

## 2022-09-30 PROCEDURE — P9047 ALBUMIN (HUMAN), 25%, 50ML: HCPCS | Performed by: INTERNAL MEDICINE

## 2022-09-30 PROCEDURE — 7100000011 HC PHASE II RECOVERY - ADDTL 15 MIN

## 2022-09-30 RX ORDER — SODIUM CHLORIDE 0.9 % (FLUSH) 0.9 %
5-40 SYRINGE (ML) INJECTION EVERY 12 HOURS SCHEDULED
Status: DISCONTINUED | OUTPATIENT
Start: 2022-09-30 | End: 2022-10-03 | Stop reason: HOSPADM

## 2022-09-30 RX ORDER — SODIUM CHLORIDE 9 MG/ML
INJECTION, SOLUTION INTRAVENOUS CONTINUOUS
Status: DISCONTINUED | OUTPATIENT
Start: 2022-09-30 | End: 2022-10-03 | Stop reason: HOSPADM

## 2022-09-30 RX ORDER — ALBUMIN (HUMAN) 12.5 G/50ML
25 SOLUTION INTRAVENOUS ONCE
Status: COMPLETED | OUTPATIENT
Start: 2022-09-30 | End: 2022-09-30

## 2022-09-30 RX ORDER — SODIUM CHLORIDE 9 MG/ML
INJECTION, SOLUTION INTRAVENOUS PRN
Status: DISCONTINUED | OUTPATIENT
Start: 2022-09-30 | End: 2022-10-03 | Stop reason: HOSPADM

## 2022-09-30 RX ORDER — ACETAMINOPHEN 325 MG/1
650 TABLET ORAL EVERY 4 HOURS PRN
Status: DISCONTINUED | OUTPATIENT
Start: 2022-09-30 | End: 2022-10-03 | Stop reason: HOSPADM

## 2022-09-30 RX ORDER — SODIUM CHLORIDE 0.9 % (FLUSH) 0.9 %
5-40 SYRINGE (ML) INJECTION PRN
Status: DISCONTINUED | OUTPATIENT
Start: 2022-09-30 | End: 2022-10-03 | Stop reason: HOSPADM

## 2022-09-30 RX ADMIN — ALBUMIN (HUMAN) 25 G: 0.25 INJECTION, SOLUTION INTRAVENOUS at 13:30

## 2022-09-30 RX ADMIN — ALBUMIN (HUMAN) 25 G: 0.25 INJECTION, SOLUTION INTRAVENOUS at 13:47

## 2022-09-30 RX ADMIN — ALBUMIN (HUMAN) 25 G: 0.25 INJECTION, SOLUTION INTRAVENOUS at 13:11

## 2022-09-30 ASSESSMENT — PAIN - FUNCTIONAL ASSESSMENT: PAIN_FUNCTIONAL_ASSESSMENT: NONE - DENIES PAIN

## 2022-09-30 NOTE — INTERVAL H&P NOTE
Update History & Physical    The patient's History and Physical of September 23, 2022 was reviewed with the patient and I examined the patient. Any changes are as documented below. Here today for IR paracentesis. Pt having weekly paracentesis due to recurrent ascites. Last paracentesis 09/23/2022 total of 9.4 L was removed. Pt reports SOB with activity. Pt has abdominal distention. Denies nausea, vomiting. Pt does have intermittent mid epigastric abdominal pain worse in the morning and does improve with movement. Denies diarrhea, constipation. Denies lower extremity swelling. Pt awake, alert, fully oriented. Heart rate and rhythm regular. Lungs clear to auscultation throughout bilaterally. Wt Readings from Last 3 Encounters:   09/30/22 201 lb (91.2 kg)   09/29/22 193 lb (87.5 kg)   09/23/22 204 lb 11.2 oz (92.9 kg)     BP Readings from Last 3 Encounters:   09/30/22 136/72   09/29/22 129/86   09/23/22 (!) 96/53       Pt did have beef broth around 0800. Otherwise, NPO since 0800. Took am medications. Denies taking any anticoagulants or blood thinning medications. Denies chest pain/pressure, palpitations, SOB, recent URI, fever or chills. Review vitals per RN flowsheet.        Electronically signed by MASSIMO Kulkarni CNP on 9/30/2022 at 12:11 PM

## 2022-09-30 NOTE — DISCHARGE INSTRUCTIONS
DISCHARGE INSTRUCTIONS FOR PARACENTESIS    In order to continue your care at home, please follow the instructions below. Medications    Use over-the-counter pain medication as directed on bottle for pain control    Post Procedure Site/Care/Activity  For up to 2 days after the procedure, you may have a small amount of clear fluid coming out of the site where the needle was inserted, especially if you had a lot of fluid removed. You may need to change the bandage on the site. Do not lie totally flat until morning. You can do your normal activities after the procedure, unless instructed differently. Call your doctor or seek immediate medical care if:   You have symptoms of infection, such as increased pain, swelling, warmth, or redness, red streaks or pus. An oral temperature (by mouth) is 101 degrees or higher (fever), chills, fever. You are dizzy or lightheaded, or you feel like you may faint. You have new or worse belly pain. Watch closely for changes in your health, and be sure to contact your doctor if:   Fluid builds up in your belly again. You do not get better as expected.       IF YOU CANNOT REACH THE DOCTOR, GO TO THE NEAREST EMERGENCY ROOM OR CALL 911    Phone: Interventional Radiology   [phone] ( Dr Luisa Sepulveda )  After hours Radiology   [phone]     10.7 Liters Removed

## 2022-09-30 NOTE — PROGRESS NOTES
Patient tolerated procedure well without distress. 10.7 L of cloudy, yellow fluid removed. Area cleansed & dry dressing applied. Transport notified to take patient back to [facility]. JERI Serrano updated.

## 2022-10-05 ENCOUNTER — HOSPITAL ENCOUNTER (OUTPATIENT)
Dept: PREADMISSION TESTING | Age: 62
Discharge: HOME | End: 2022-10-09

## 2022-10-05 VITALS — HEIGHT: 70 IN | BODY MASS INDEX: 25.05 KG/M2 | WEIGHT: 175 LBS

## 2022-10-05 NOTE — PROGRESS NOTES

## 2022-10-07 ENCOUNTER — HOSPITAL ENCOUNTER (OUTPATIENT)
Dept: INTERVENTIONAL RADIOLOGY/VASCULAR | Age: 62
Discharge: HOME | End: 2022-10-09
Payer: MEDICARE

## 2022-10-07 VITALS
BODY MASS INDEX: 28.35 KG/M2 | HEIGHT: 70 IN | HEART RATE: 80 BPM | RESPIRATION RATE: 18 BRPM | DIASTOLIC BLOOD PRESSURE: 50 MMHG | SYSTOLIC BLOOD PRESSURE: 105 MMHG | WEIGHT: 198 LBS | TEMPERATURE: 97.3 F | OXYGEN SATURATION: 99 %

## 2022-10-07 DIAGNOSIS — K70.31 ALCOHOLIC CIRRHOSIS OF LIVER WITH ASCITES (HCC): ICD-10-CM

## 2022-10-07 DIAGNOSIS — D64.9 ANEMIA, UNSPECIFIED TYPE: ICD-10-CM

## 2022-10-07 PROCEDURE — 7100000011 HC PHASE II RECOVERY - ADDTL 15 MIN

## 2022-10-07 PROCEDURE — 49083 ABD PARACENTESIS W/IMAGING: CPT

## 2022-10-07 PROCEDURE — 7100000010 HC PHASE II RECOVERY - FIRST 15 MIN

## 2022-10-07 PROCEDURE — 2580000003 HC RX 258: Performed by: RADIOLOGY

## 2022-10-07 PROCEDURE — 6360000002 HC RX W HCPCS: Performed by: INTERNAL MEDICINE

## 2022-10-07 PROCEDURE — P9047 ALBUMIN (HUMAN), 25%, 50ML: HCPCS | Performed by: INTERNAL MEDICINE

## 2022-10-07 PROCEDURE — 6360000002 HC RX W HCPCS: Performed by: RADIOLOGY

## 2022-10-07 PROCEDURE — 2709999900 IR US GUIDED PARACENTESIS

## 2022-10-07 RX ORDER — ALBUMIN (HUMAN) 12.5 G/50ML
25 SOLUTION INTRAVENOUS ONCE
Status: COMPLETED | OUTPATIENT
Start: 2022-10-07 | End: 2022-10-07

## 2022-10-07 RX ORDER — SODIUM CHLORIDE 9 MG/ML
INJECTION, SOLUTION INTRAVENOUS CONTINUOUS
Status: DISCONTINUED | OUTPATIENT
Start: 2022-10-07 | End: 2022-10-10 | Stop reason: HOSPADM

## 2022-10-07 RX ORDER — SODIUM CHLORIDE 0.9 % (FLUSH) 0.9 %
5-40 SYRINGE (ML) INJECTION PRN
Status: DISCONTINUED | OUTPATIENT
Start: 2022-10-07 | End: 2022-10-10 | Stop reason: HOSPADM

## 2022-10-07 RX ORDER — FERROUS SULFATE 325(65) MG
TABLET ORAL
Qty: 60 TABLET | Refills: 5 | Status: SHIPPED | OUTPATIENT
Start: 2022-10-07

## 2022-10-07 RX ORDER — SODIUM CHLORIDE 0.9 % (FLUSH) 0.9 %
5-40 SYRINGE (ML) INJECTION EVERY 12 HOURS SCHEDULED
Status: DISCONTINUED | OUTPATIENT
Start: 2022-10-07 | End: 2022-10-10 | Stop reason: HOSPADM

## 2022-10-07 RX ORDER — SODIUM CHLORIDE 9 MG/ML
INJECTION, SOLUTION INTRAVENOUS PRN
Status: DISCONTINUED | OUTPATIENT
Start: 2022-10-07 | End: 2022-10-10 | Stop reason: HOSPADM

## 2022-10-07 RX ORDER — HEPARIN SODIUM (PORCINE) LOCK FLUSH IV SOLN 100 UNIT/ML 100 UNIT/ML
100 SOLUTION INTRAVENOUS PRN
Status: DISCONTINUED | OUTPATIENT
Start: 2022-10-07 | End: 2022-10-10 | Stop reason: HOSPADM

## 2022-10-07 RX ADMIN — ALBUMIN (HUMAN) 25 G: 0.25 INJECTION, SOLUTION INTRAVENOUS at 13:30

## 2022-10-07 RX ADMIN — SODIUM CHLORIDE: 9 INJECTION, SOLUTION INTRAVENOUS at 12:51

## 2022-10-07 RX ADMIN — SODIUM CHLORIDE, PRESERVATIVE FREE 10 ML: 5 INJECTION INTRAVENOUS at 14:55

## 2022-10-07 RX ADMIN — ALBUMIN (HUMAN) 25 G: 0.25 INJECTION, SOLUTION INTRAVENOUS at 13:54

## 2022-10-07 RX ADMIN — Medication 100 UNITS: at 14:55

## 2022-10-07 ASSESSMENT — PAIN - FUNCTIONAL ASSESSMENT: PAIN_FUNCTIONAL_ASSESSMENT: NONE - DENIES PAIN

## 2022-10-07 NOTE — DISCHARGE INSTRUCTIONS
DISCHARGE INSTRUCTIONS FOR PARACENTESIS    In order to continue your care at home, please follow the instructions below. Medications    Use over-the-counter pain medication as directed on bottle for pain control    Post Procedure Site/Care/Activity  For up to 2 days after the procedure, you may have a small amount of clear fluid coming out of the site where the needle was inserted, especially if you had a lot of fluid removed. You may need to change the bandage on the site. Do not lie totally flat until morning. You can do your normal activities after the procedure, unless instructed differently. Call your doctor or seek immediate medical care if:   You have symptoms of infection, such as increased pain, swelling, warmth, or redness, red streaks or pus. An oral temperature (by mouth) is 101 degrees or higher (fever), chills, fever. You are dizzy or lightheaded, or you feel like you may faint. You have new or worse belly pain. Watch closely for changes in your health, and be sure to contact your doctor if:   Fluid builds up in your belly again. You do not get better as expected.       IF YOU CANNOT REACH THE DOCTOR, GO TO THE NEAREST EMERGENCY ROOM OR CALL 911    Phone: Interventional Radiology   [phone] ( Dr Yuriy Woods )  After hours Radiology   [phone]     10.6 Liters removed  Pre weight 198 lbs  Post weight 179.9 lbs

## 2022-10-07 NOTE — BRIEF OP NOTE
Brief Postoperative Note    Maryuri Arredondo  YOB: 1960  [id number]    Pre-operative Diagnosis: Ascites    Post-operative Diagnosis: Same    Procedure: Paracentesis    Anesthesia: Local    Surgeons/Assistants: Hari Bragg    Estimated Blood Loss: less than 50     Complications: None    Specimens: Was Not Obtained    Findings: U/S guided right paracentesis yielding 10.6 cloudy yellow fluid. Albumin therapy initiated.      Electronically signed by Maine Langford MD on 10/7/2022 at 3:41 PM

## 2022-10-07 NOTE — PROGRESS NOTES
Patient tolerated procedure well without distress. 10.6L of cloudy, yellow fluid removed. Area cleansed & dry dressing applied. Transport notified to take patient back to [facility]. JERI Mcclure updated.

## 2022-10-07 NOTE — INTERVAL H&P NOTE
Update History & Physical    The patient's History and Physical of September 23, 2022 was reviewed with the patient and I examined the patient. Any changes are as documented below. Here today for IR Paracentesis due to recurrent ascites. He has weekly paracentesis with last being 09/30/2022 with a total of 10.7 L of cloudy light stephanie colored fluid taken off. Patient complains of and abdominal distention. Denies shortness of breath, leg swelling. Denies nausea, vomiting, diarrhea, constipation. Pt awake, alert, fully oriented. Heart rate and rhythm regular. Lungs clear to auscultation throughout bilaterally. Abdomen distended. No marked localized tenderness. No guarding or rigidity. Difficult to palpate deeper organs because of the ascites. No sclera icterus, no jaundice. BP Readings from Last 3 Encounters:   10/07/22 124/73   09/30/22 118/70   09/29/22 129/86     Wt Readings from Last 3 Encounters:   10/07/22 198 lb (89.8 kg)   10/05/22 175 lb (79.4 kg)   09/30/22 201 lb (91.2 kg)     NPO p 1000. Took am medications. Denies taking any anticoagulants or blood thinning medications. Denies chest pain/pressure, palpitations, recent URI, fever or chills.      Electronically signed by MASSIMO Kulkarni CNP on 10/7/2022 at 12:39 PM

## 2022-10-14 ENCOUNTER — HOSPITAL ENCOUNTER (OUTPATIENT)
Dept: INTERVENTIONAL RADIOLOGY/VASCULAR | Age: 62
Discharge: HOME | End: 2022-10-16
Payer: MEDICARE

## 2022-10-14 VITALS
RESPIRATION RATE: 18 BRPM | SYSTOLIC BLOOD PRESSURE: 121 MMHG | TEMPERATURE: 97.5 F | OXYGEN SATURATION: 95 % | HEART RATE: 89 BPM | BODY MASS INDEX: 28.06 KG/M2 | WEIGHT: 196 LBS | HEIGHT: 70 IN | DIASTOLIC BLOOD PRESSURE: 80 MMHG

## 2022-10-14 DIAGNOSIS — K70.31 ALCOHOLIC CIRRHOSIS OF LIVER WITH ASCITES (HCC): ICD-10-CM

## 2022-10-14 LAB
INR BLD: 1.2
PARTIAL THROMBOPLASTIN TIME: 35 SEC (ref 24–36)
PLATELET # BLD: 193 K/UL (ref 150–450)
PROTHROMBIN TIME: 15.3 SEC (ref 11.8–14.6)

## 2022-10-14 PROCEDURE — P9047 ALBUMIN (HUMAN), 25%, 50ML: HCPCS | Performed by: RADIOLOGY

## 2022-10-14 PROCEDURE — 36415 COLL VENOUS BLD VENIPUNCTURE: CPT

## 2022-10-14 PROCEDURE — 6360000002 HC RX W HCPCS: Performed by: RADIOLOGY

## 2022-10-14 PROCEDURE — 2580000003 HC RX 258: Performed by: RADIOLOGY

## 2022-10-14 PROCEDURE — 85610 PROTHROMBIN TIME: CPT

## 2022-10-14 PROCEDURE — 85049 AUTOMATED PLATELET COUNT: CPT

## 2022-10-14 PROCEDURE — 2709999900 IR US GUIDED PARACENTESIS

## 2022-10-14 PROCEDURE — 85730 THROMBOPLASTIN TIME PARTIAL: CPT

## 2022-10-14 PROCEDURE — 49083 ABD PARACENTESIS W/IMAGING: CPT

## 2022-10-14 PROCEDURE — 7100000011 HC PHASE II RECOVERY - ADDTL 15 MIN

## 2022-10-14 PROCEDURE — 7100000010 HC PHASE II RECOVERY - FIRST 15 MIN

## 2022-10-14 RX ORDER — SODIUM CHLORIDE 9 MG/ML
INJECTION, SOLUTION INTRAVENOUS CONTINUOUS
Status: DISCONTINUED | OUTPATIENT
Start: 2022-10-14 | End: 2022-10-17 | Stop reason: HOSPADM

## 2022-10-14 RX ORDER — HEPARIN SODIUM (PORCINE) LOCK FLUSH IV SOLN 100 UNIT/ML 100 UNIT/ML
100 SOLUTION INTRAVENOUS PRN
Status: DISCONTINUED | OUTPATIENT
Start: 2022-10-14 | End: 2022-10-17 | Stop reason: HOSPADM

## 2022-10-14 RX ORDER — ALBUMIN (HUMAN) 12.5 G/50ML
50 SOLUTION INTRAVENOUS ONCE
Status: COMPLETED | OUTPATIENT
Start: 2022-10-14 | End: 2022-10-14

## 2022-10-14 RX ORDER — SODIUM CHLORIDE 0.9 % (FLUSH) 0.9 %
5-40 SYRINGE (ML) INJECTION EVERY 12 HOURS SCHEDULED
Status: DISCONTINUED | OUTPATIENT
Start: 2022-10-14 | End: 2022-10-17 | Stop reason: HOSPADM

## 2022-10-14 RX ORDER — SODIUM CHLORIDE 9 MG/ML
INJECTION, SOLUTION INTRAVENOUS PRN
Status: DISCONTINUED | OUTPATIENT
Start: 2022-10-14 | End: 2022-10-17 | Stop reason: HOSPADM

## 2022-10-14 RX ORDER — SODIUM CHLORIDE 0.9 % (FLUSH) 0.9 %
5-40 SYRINGE (ML) INJECTION PRN
Status: DISCONTINUED | OUTPATIENT
Start: 2022-10-14 | End: 2022-10-17 | Stop reason: HOSPADM

## 2022-10-14 RX ADMIN — ALBUMIN (HUMAN) 50 G: 0.25 INJECTION, SOLUTION INTRAVENOUS at 13:20

## 2022-10-14 RX ADMIN — HEPARIN 100 UNITS: 100 SYRINGE at 14:42

## 2022-10-14 RX ADMIN — SODIUM CHLORIDE, PRESERVATIVE FREE 10 ML: 5 INJECTION INTRAVENOUS at 14:41

## 2022-10-14 ASSESSMENT — ENCOUNTER SYMPTOMS
ABDOMINAL DISTENTION: 1
SORE THROAT: 0
ABDOMINAL PAIN: 0
TROUBLE SWALLOWING: 0
RHINORRHEA: 0
CONSTIPATION: 0
DIARRHEA: 0
WHEEZING: 0
SHORTNESS OF BREATH: 1
VOMITING: 0
BLOOD IN STOOL: 0
NAUSEA: 0
ANAL BLEEDING: 0
COUGH: 0

## 2022-10-14 ASSESSMENT — PAIN - FUNCTIONAL ASSESSMENT: PAIN_FUNCTIONAL_ASSESSMENT: NONE - DENIES PAIN

## 2022-10-14 NOTE — H&P
HISTORY and [address] [id number]       NAME:  Daniela Bernal  MRN: [id number]   YOB: 1960   Date: 10/14/2022   Age: 60 y.o. Gender: male     COMPLAINT AND PRESENT HISTORY:   Daniela Bernal is 60 y.o.,  male, undergoing paracentesis for alcoholic cirrhosis per Dr. Felice Paget. HPI:PRE-PARACENTESIS QUESTIONNAIRE:   Last paracentesis date:10/7/22  Amount removed: 10L  Tolerated well: Yes  Any complications:No  Interval between paracentesis dates:Weekly   Taking medications as prescribed:Yes    Shortness of breath: Yes  ABD distention:Yes  ABD pain:No  Nausea:No  Vomiting:No  Constipation/diarrhea:No  Fever/chills:No    Review of additional SIGNIFICANT medical hx (see chart for additional detail, including current medications / see ROS for current s/s):   ESOPHAGEAL CA, HEP. C, HX OF ETOH ABUSE,  PORTAL HTN, ESOPHAGEAL VARICES, CIRRHOSIS, HTN, HLD, JIMENES ESOPHAGUS, COVID-19, SOB. EKG 9/12/22:  Normal sinus rhythm  Low voltage QRS  Nonspecific ST and T wave abnormality  Abnormal ECG  When compared with ECG of 05-JUL-2022 14:27,  Previous ECG has undetermined rhythm, needs review    ECHO 12/20/21:  Summary  Left ventricle is normal in size. Mild left ventricular hypertrophy. Global left ventricular systolic function is normal. Estimated LV EF 60-65  %. Left atrium is mildly dilated. No significant valvular regurgitation or stenosis seen. No significant pericardial effusion is seen. Normal aortic root dimension. NPO status: This morning about 0800  Anticoagulation status: Denies any thinners/anticoagulant     Denies personal hx of blood clots. Denies personal hx of MRSA infection. Denies any personal or family hx of previous complications w/anesthesia.   PAST MEDICAL HISTORY     Past Medical History:   Diagnosis Date    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 ([address]     Alcoholic cirrhosis of liver with ascites ([facility]     Anemia 04/13/2022    Anxiety     Arthritis     Back pain, chronic     dr. Yan Castro, orthopedic, every 3-4 months, gets steroid injection    Lezama esophagus     BPH (benign prostatic hypertrophy)     Cholelithiasis     Cirrhosis ([address]     COVID-19 12/2020    pt reports he had a positive test while at [facility] in 2020, was asymptomatic    COVID-19 vaccine series completed 5/20/2021, 6/22/2021    Moderna 5/20/2021, 6/22/2021    DDD (degenerative disc disease), lumbar     Depression     Esophageal cancer ([address]     INVASIVE ADENOCARCINOMA ARISING IN TUBULAR ADENOMA WITH HIGH GRADE DYSPLASIA, ASSOCIATED WITH FOCAL INTESTINAL METAPLASIA     Esophageal varices ([address]     Fatty liver     GERD (gastroesophageal reflux disease)     Hep C w/o coma, chronic ([address]     History of alcohol abuse     6-12 beers a day; quit drinking 2019    History of blood transfusion     History of colon polyps 2016    History of tobacco abuse     [place] (hard of hearing)     Hyperlipidemia     Hypertension     Hyponatremia 07/20/2016    Port-A-Cath in place     right upper chest    Portal hypertension ([address]     Sciatica     Secondary esophageal varices ([address] 06/07/2022    Shortness of breath     Spinal stenosis     Stomach ulcer     hx of    Tubular adenoma of colon 2016, 2018    Vitamin D deficiency     Wears glasses        SURGICAL HISTORY       Past Surgical History:   Procedure Laterality Date    BUNIONECTOMY      twice on right side    BUNIONECTOMY Left     CARPAL TUNNEL RELEASE Right     COLONOSCOPY      at age 35    COLONOSCOPY  10/05/2016    polyps-pathology tubular adenoma, and abnormal looking mucosa right colon-pathology-tubular adenoma    COLONOSCOPY N/A 03/30/2018    COLONOSCOPY POLYPECTOMY COLD BIOPSY performed by Keith Rosenberg MD at [address]  03/30/2018    Small polyp in the sigmoid colon and excised with biopsy forceps--tubular adenoma    COLONOSCOPY N/A 04/16/2022    COLONOSCOPY POLYPECTOMY performed by Keith Rosenberg MD at [facility], DIAGNOSTIC EGD    IR PORT PLACEMENT EQUAL OR GREATER THAN 5 YEARS  04/19/2021    IR PORT PLACEMENT EQUAL OR GREATER THAN 5 YEARS 4/19/2021 [facility] SPECIAL PROCEDURES    KNEE SURGERY Left     cyst removed    NASAL SEPTUM SURGERY      NERVE BLOCK Right 11/23/2020    NERVE BLOCK RIGHT CERVICAL STEROID INJECTION  C3-C6 performed by Thiago Hester MD at [address] [facility]  01/04/2016    steroid injection C7 T1    OTHER SURGICAL HISTORY  11/21/2016    Bilateral Lumbar CACHORRO L4-L5 injections    OTHER SURGICAL HISTORY  12/19/2016    lumbar steroid injection    OTHER SURGICAL HISTORY  09/28/2018    BILATERAL L5 CACHORRO (N/A Back)    OTHER SURGICAL HISTORY Right 11/23/2020    cervical injection    PAIN MANAGEMENT PROCEDURE Left 07/09/2020    EPIDURAL STEROID INJECTION LEFT L4 L5 performed by Thiago Hester MD at [address] Left 07/20/2020    LEFT L4 L5 EPIDURAL STEROID INJECTION performed by Thiago Hester MD at [facility] [address] Bilateral 08/17/2020    LUMBAR FACET BILATERAL L2-L5 performed by Thiago Hester MD at [facility] [address] Bilateral 12/07/2020    NERVE BLOCK BILATERAL LUMBAR MEDIAL BRANCH L2-L5 performed by Thiago Hester MD at [address] [place] [address] [address] AA&/STRD TFRML EPI LUMBAR/SACRAL 1 LEVEL Bilateral 09/06/2018    BILATERAL L5 CACHORRO performed by Thiago Hester MD at [facility] AA&/STRD TFRML EPI LUMBAR/SACRAL 1 LEVEL N/A 09/28/2018    BILATERAL L5 CACHORRO performed by Thiago Hester MD at [address] [place] [address] N/CARPAL TUNNEL SURG Right 08/29/2017    CARPAL TUNNEL RELEASE RIGHT performed by Evelin Kimble MD at [address] [place] [address] N/CARPAL TUNNEL SURG Left 10/31/2017    CARPAL TUNNEL RELEASE performed by Evelin Kimble MD at [address] [place] [address] 12/29/2020    EGD BIOPSY performed by Yuly Syed MD at [address] [place] [address] 02/02/2021    EGD BIOPSY and spot marking performed by Renay Smiley MD at [place] [address] 2021    ENDOSCOPIC ULTRASOUND, EGD performed by Marcus Santillan MD at [facility] 1  2021    EGD DIAGNOSTIC ONLY performed by Marcus Santillan MD at [facility] 1 2021    EGD BIOPSY performed by Renay Smiley MD at [place] [address] 2022    EGD BIOPSY performed by Renay Smiley MD at [place] [address] N/A 04/15/2022    EGD ESOPHAGOGASTRODUODENOSCOPY performed by Dawit Mitchell MD at [address] [place] [address] N/A 2022    EGD BAND LIGATION performed by Merari Ron MD at [place] [address] N/A 2022    EGD ESOPHAGOGASTRODUODENOSCOPY performed by Merari Ron MD at [place] [address] N/A 2022    EGD ESOPHAGOGASTRODUODENOSCOPY ENDOSCOPIC APC AT [facility] performed by Coreen Seymour MD at [address] [place] [address] History     Socioeconomic History    Marital status: Single   Tobacco Use    Smoking status: Former     Packs/day: 1.00     Years: 45.00     Pack years: 45.00     Types: Cigarettes     Quit date: 2017     Years since quittin.7    Smokeless tobacco: Never   Vaping Use    Vaping Use: Never used   Substance and Sexual Activity    Alcohol use: Not Currently     Comment: Quit alcohol in 2019- heavier drinking prior to quitting    Drug use: Not Currently     Frequency: 1.0 times per week     Types: Cocaine     Comment: Cocaine- stopped spring 2016    Sexual activity: Yes     Partners: Female   Social History Narrative     in the past, retired     Social Determinants of Health     Financial Resource Strain: Low Risk     Difficulty of Paying Living Expenses: Not hard at all   Food Insecurity: No Food Insecurity    Worried About Running Out of Food in the Last Year: Never true    Ran Out of Food in the Last Year: Never true   Physical Activity: Inactive    Days of Exercise per Week: 0 days    Minutes of Exercise per Session: 0 min       REVIEW OF SYSTEMS    No Known Allergies    Current Outpatient Medications on File Prior to Encounter   Medication Sig Dispense Refill    FEROSUL 325 (65 Fe) MG tablet take 1 tablet by mouth twice a day 60 tablet 5    nadolol (CORGARD) 20 MG tablet take 1 tablet by mouth once daily (Patient not taking: Reported on 9/30/2022) 30 tablet 2    midodrine (PROAMATINE) 5 MG tablet Take 2 tablets by mouth in the morning and 2 tablets at noon and 2 tablets before bedtime. 90 tablet 3    furosemide (LASIX) 20 MG tablet Take 1 tablet by mouth 2 times daily 60 tablet 3    spironolactone (ALDACTONE) 100 MG tablet Take 1 tablet by mouth every evening (Patient taking differently: Take 100 mg by mouth 3 times daily) 30 tablet 1    ondansetron (ZOFRAN-ODT) 4 MG disintegrating tablet dissolve 1 tablet by mouth every 8 hours if needed for nausea and vomiting 10 tablet 0    lactulose (CHRONULAC) 10 GM/15ML solution take 30 milliliters by mouth three times a day 473 mL 1    FLUoxetine (PROZAC) 20 MG capsule Take 1 capsule by mouth daily 30 capsule 3    atorvastatin (LIPITOR) 20 MG tablet Take 1 tablet by mouth nightly 30 tablet 3     No current facility-administered medications on file prior to encounter. Review of Systems   Constitutional:  Negative for chills and fever. HENT:  Negative for congestion, ear pain, postnasal drip, rhinorrhea, sore throat and trouble swallowing. Respiratory:  Positive for shortness of breath. Negative for cough and wheezing. Cardiovascular:  Negative for chest pain, palpitations and leg swelling. Gastrointestinal:  Positive for abdominal distention.  Negative for abdominal pain, anal bleeding, blood in stool, constipation, diarrhea, nausea and vomiting. Genitourinary:  Negative for dysuria and frequency. Skin:  Negative for rash. Neurological:  Negative for dizziness and headaches. Hematological:  Does not bruise/bleed easily. GENERAL PHYSICAL EXAM     Vitals: See nurse flowsheet    GENERAL APPEARANCE:   Baljeet Kumar is 60 y.o.,  male, not obese, nourished, conscious, alert. Does not appear to be in any distress or pain at this time. Physical Exam  Constitutional:       General: He is not in acute distress. Appearance: He is obese. HENT:      Head: Normocephalic. Nose: Nose normal.      Mouth/Throat:      Mouth: Mucous membranes are dry. Pharynx: Oropharynx is clear. Comments: Multiple dental caries   Eyes:      Extraocular Movements: Extraocular movements intact. Pupils: Pupils are equal, round, and reactive to light. Comments: Wearing glasses   Cardiovascular:      Rate and Rhythm: Normal rate and regular rhythm. Pulmonary:      Effort: Pulmonary effort is normal.      Breath sounds: Normal breath sounds. Abdominal:      General: Abdomen is protuberant. Bowel sounds are normal. There is distension. Comments: ascites   Musculoskeletal:         General: Normal range of motion. Cervical back: Normal range of motion. Skin:     General: Skin is warm and dry. Findings: No bruising. Neurological:      General: No focal deficit present. Mental Status: He is alert and oriented to person, place, and time. Mental status is at baseline.        RECENT LAB WORK     Lab Results   Component Value Date     (L) 09/28/2022    K 4.3 09/28/2022    CL 94 (L) 09/28/2022    CO2 23 09/28/2022    BUN 8 09/28/2022    CREATININE 0.56 (L) 09/28/2022    GLUCOSE 95 09/28/2022    CALCIUM 9.1 09/28/2022    PROT 6.0 (L) 09/28/2022    LABALBU 3.3 (L) 09/28/2022    BILITOT 1.7 (H) 09/28/2022    ALKPHOS 284 (H) 09/28/2022    AST 65 (H) 09/28/2022    ALT 30 09/28/2022    LABGLOM >60 09/28/2022    GFRAA >60 09/28/2022    GLOB NOT REPORTED 08/19/2021     Lab Results   Component Value Date    WBC 6.9 09/28/2022    HGB 10.2 (L) 09/28/2022    HCT 31.5 (L) 09/28/2022    MCV 84.4 09/28/2022     09/28/2022     PROVISIONAL DIAGNOSES / PROCEDURE:    Paracentesis     Alcoholic cirrhosis   Patient Active Problem List    Diagnosis Date Noted    GI bleed 09/13/2022    Secondary esophageal varices ([address] 06/07/2022    Esophageal polyp 06/07/2022    Drop in hemoglobin 06/03/2022    Portal hypertension (HCC)     Shortness of breath     Ascites 04/26/2022    Acute kidney failure, unspecified ([address] 04/21/2022    Muscle weakness (generalized) 07/28/2016    Other abnormalities of gait and mobility 07/28/2016    Anemia 04/13/2022    Acute kidney injury ([address] 04/13/2022    Esophageal adenocarcinoma ([address]     Low hemoglobin 12/20/2021    Symptomatic anemia, microcytic, acute 12/20/2021    Hypotension 12/20/2021    Former smoker, 50+ pack years, quit 2016 12/20/2021    HLD (hyperlipidemia) 12/20/2021    Abnormal findings on diagnostic imaging of spine 12/14/2021    Cervical spinal stenosis 12/14/2021    Spinal stenosis of lumbar region with neurogenic claudication 12/14/2021    Severe comorbid illness 11/30/2021    Gait instability 11/30/2021    Current smoker 04/05/2021    COVID-19 02/23/2021    Anxiety 02/23/2021    Malignant neoplasm of lower third of esophagus ([address]     Hypocalcemia 12/26/2020    Hypophosphatemia 12/26/2020    Gastrointestinal hemorrhage with melena     Alcohol abuse     Altered mental status     Acute gastrointestinal bleeding 12/23/2020    Thrombocytopenia ([address] 12/23/2020    Hepatitis C virus infection resolved after antiviral drug therapy 12/23/2020    Cervical facet syndrome 11/23/2020    Lumbar facet arthropathy 08/17/2020    Elevated LFTs 08/12/2020    Seasonal allergies 08/12/2020    S/P epidural steroid injection 08/05/2020    Essential hypertension 04/24/2019    Recurrent major depressive disorder in partial remission ([facility] 04/24/2019    Pure hypercholesterolemia 02/04/2019    Hypokalemia 02/04/2019    Vitamin D deficiency 09/20/2017    History of hepatitis C 09/11/2017    Ganglion cyst 05/31/2017    Carpal tunnel syndrome of right wrist 05/31/2017    Tinnitus 03/23/2017    Eustachian tube dysfunction 03/23/2017    Lumbar radiculitis 11/08/2016    Lumbar disc herniation 11/08/2016    Gynecomastia, male 10/26/2016    Depression 10/13/2016    Vertebrogenic low back pain 10/06/2016    DDD (degenerative disc disease), lumbar 10/06/2016    Hepatic cirrhosis ([address] 09/15/2016    Psychophysiologic insomnia 09/14/2016    Cirrhosis ([address]     Hep C w/o coma, chronic (HCC)     Fatty liver     Calculus of gallbladder without cholecystitis 08/10/2016    Chronic viral hepatitis B without delta agent and without coma ([address] 07/22/2016    Hypomagnesemia     Alcohol withdrawal syndrome without complication ([address] 59/85/8499    Cervical radicular pain 01/04/2016    Tubular adenoma of colon 01/01/2016    History of colon polyps 01/01/2016    Back pain, chronic 04/19/2012    Hearing difficulty 04/19/2012    GERD (gastroesophageal reflux disease) 04/19/2012           MASSIMO FAUST - CNP on 10/14/2022 at 12:14 PM

## 2022-10-14 NOTE — DISCHARGE INSTRUCTIONS
DISCHARGE INSTRUCTIONS FOR PARACENTESIS    In order to continue your care at home, please follow the instructions below. Medications    Use over-the-counter pain medication as directed on bottle for pain control    Post Procedure Site/Care/Activity  For up to 2 days after the procedure, you may have a small amount of clear fluid coming out of the site where the needle was inserted, especially if you had a lot of fluid removed. You may need to change the bandage on the site. Do not lie totally flat until morning. You can do your normal activities after the procedure, unless instructed differently. Call your doctor or seek immediate medical care if:   You have symptoms of infection, such as increased pain, swelling, warmth, or redness, red streaks or pus. An oral temperature (by mouth) is 101 degrees or higher (fever), chills, fever. You are dizzy or lightheaded, or you feel like you may faint. You have new or worse belly pain. Watch closely for changes in your health, and be sure to contact your doctor if:   Fluid builds up in your belly again. You do not get better as expected.       IF YOU CANNOT REACH THE DOCTOR, GO TO THE NEAREST EMERGENCY ROOM OR CALL 911    Phone: Interventional Radiology   [phone]  Dr Potter Favorite  After hours Radiology   [phone]        9.7 Liters removed    Before procedure weight 196 pounds  After procedure weight 178 pounds

## 2022-10-14 NOTE — BRIEF OP NOTE
Brief Postoperative Note    Casandra Cyr  YOB: 1960  [id number]    Pre-operative Diagnosis: Recurrent ascites; abdominal discomfort    Post-operative Diagnosis: Same    Procedure: US guided paracentesis     Anesthesia: Local    Surgeons/Assistants: Gregg    Estimated Blood Loss: less than 50     Complications: None    Specimens: Was Not Obtained     Electronically signed by Luis Manuel Anton MD on 10/14/2022 at 1:27 PM

## 2022-10-14 NOTE — PROGRESS NOTES
Ultrasound in use. Right ab prepped and draped. Numbed and accessed per Dr. Peters Client. 9.7 Liters cloudy yellow fluid removed. Access removed and dressing placed. Pt tolerated well.

## 2022-10-18 ENCOUNTER — HOSPITAL ENCOUNTER (OUTPATIENT)
Age: 62
Discharge: HOME | End: 2022-10-18
Payer: MEDICARE

## 2022-10-18 ENCOUNTER — ANESTHESIA EVENT (OUTPATIENT)
Dept: ENDOSCOPY | Age: 62
End: 2022-10-18
Payer: MEDICARE

## 2022-10-18 DIAGNOSIS — K22.711 BARRETT'S ESOPHAGUS WITH HIGH GRADE DYSPLASIA: ICD-10-CM

## 2022-10-18 DIAGNOSIS — D64.9 ANEMIA, UNSPECIFIED TYPE: ICD-10-CM

## 2022-10-18 DIAGNOSIS — B18.2 HEP C W/O COMA, CHRONIC (HCC): ICD-10-CM

## 2022-10-18 DIAGNOSIS — K70.31 ASCITES DUE TO ALCOHOLIC CIRRHOSIS (HCC): ICD-10-CM

## 2022-10-18 DIAGNOSIS — E87.1 HYPONATREMIA: ICD-10-CM

## 2022-10-18 DIAGNOSIS — K76.6 PORTAL HYPERTENSIVE GASTROPATHY (HCC): ICD-10-CM

## 2022-10-18 DIAGNOSIS — K31.89 PORTAL HYPERTENSIVE GASTROPATHY (HCC): ICD-10-CM

## 2022-10-18 LAB
ABSOLUTE EOS #: 0 K/UL (ref 0–0.4)
ABSOLUTE LYMPH #: 0.5 K/UL (ref 1–4.8)
ABSOLUTE MONO #: 1 K/UL (ref 0.1–1.3)
ALBUMIN SERPL-MCNC: 3.4 G/DL (ref 3.5–5.2)
ALP BLD-CCNC: 151 U/L (ref 40–129)
ALT SERPL-CCNC: 22 U/L (ref 5–41)
ANION GAP SERPL CALCULATED.3IONS-SCNC: 11 MMOL/L (ref 9–17)
AST SERPL-CCNC: 40 U/L
BASOPHILS # BLD: 0 % (ref 0–2)
BASOPHILS ABSOLUTE: 0 K/UL (ref 0–0.2)
BILIRUB SERPL-MCNC: 1.6 MG/DL (ref 0.3–1.2)
BUN BLDV-MCNC: 11 MG/DL (ref 8–23)
CALCIUM SERPL-MCNC: 8.8 MG/DL (ref 8.6–10.4)
CHLORIDE BLD-SCNC: 94 MMOL/L (ref 98–107)
CO2: 20 MMOL/L (ref 20–31)
CREAT SERPL-MCNC: 0.77 MG/DL (ref 0.7–1.2)
EOSINOPHILS RELATIVE PERCENT: 0 % (ref 0–4)
GFR SERPL CREATININE-BSD FRML MDRD: >60 ML/MIN/1.73M2
GLUCOSE BLD-MCNC: 107 MG/DL (ref 70–99)
HCT VFR BLD CALC: 25 % (ref 41–53)
HEMOGLOBIN: 7.9 G/DL (ref 13.5–17.5)
LYMPHOCYTES # BLD: 6 % (ref 24–44)
MCH RBC QN AUTO: 27.2 PG (ref 26–34)
MCHC RBC AUTO-ENTMCNC: 31.5 G/DL (ref 31–37)
MCV RBC AUTO: 86.5 FL (ref 80–100)
MONOCYTES # BLD: 12 % (ref 1–7)
PDW BLD-RTO: 16.4 % (ref 11.5–14.9)
PLATELET # BLD: 218 K/UL (ref 150–450)
PMV BLD AUTO: 6.5 FL (ref 6–12)
POTASSIUM SERPL-SCNC: 4.9 MMOL/L (ref 3.7–5.3)
RBC # BLD: 2.89 M/UL (ref 4.5–5.9)
SEG NEUTROPHILS: 82 % (ref 36–66)
SEGMENTED NEUTROPHILS ABSOLUTE COUNT: 7.1 K/UL (ref 1.3–9.1)
SODIUM BLD-SCNC: 125 MMOL/L (ref 135–144)
TOTAL PROTEIN: 5.8 G/DL (ref 6.4–8.3)
WBC # BLD: 8.6 K/UL (ref 3.5–11)

## 2022-10-18 PROCEDURE — 80053 COMPREHEN METABOLIC PANEL: CPT

## 2022-10-18 PROCEDURE — 85025 COMPLETE CBC W/AUTO DIFF WBC: CPT

## 2022-10-19 ENCOUNTER — ANESTHESIA (OUTPATIENT)
Dept: ENDOSCOPY | Age: 62
End: 2022-10-19
Payer: MEDICARE

## 2022-10-19 ENCOUNTER — HOSPITAL ENCOUNTER (OUTPATIENT)
Age: 62
Setting detail: OUTPATIENT SURGERY
Discharge: HOME | End: 2022-10-19
Attending: INTERNAL MEDICINE | Admitting: INTERNAL MEDICINE
Payer: MEDICARE

## 2022-10-19 VITALS
TEMPERATURE: 97.5 F | DIASTOLIC BLOOD PRESSURE: 71 MMHG | BODY MASS INDEX: 25.77 KG/M2 | HEIGHT: 70 IN | RESPIRATION RATE: 17 BRPM | HEART RATE: 98 BPM | WEIGHT: 180 LBS | OXYGEN SATURATION: 99 % | SYSTOLIC BLOOD PRESSURE: 109 MMHG

## 2022-10-19 DIAGNOSIS — F10.10 ALCOHOL ABUSE: Primary | ICD-10-CM

## 2022-10-19 PROCEDURE — 43235 EGD DIAGNOSTIC BRUSH WASH: CPT | Performed by: INTERNAL MEDICINE

## 2022-10-19 PROCEDURE — 3700000000 HC ANESTHESIA ATTENDED CARE: Performed by: INTERNAL MEDICINE

## 2022-10-19 PROCEDURE — 7100000010 HC PHASE II RECOVERY - FIRST 15 MIN: Performed by: INTERNAL MEDICINE

## 2022-10-19 PROCEDURE — 6360000002 HC RX W HCPCS: Performed by: INTERNAL MEDICINE

## 2022-10-19 PROCEDURE — 6360000002 HC RX W HCPCS: Performed by: NURSE ANESTHETIST, CERTIFIED REGISTERED

## 2022-10-19 PROCEDURE — 7100000011 HC PHASE II RECOVERY - ADDTL 15 MIN: Performed by: INTERNAL MEDICINE

## 2022-10-19 PROCEDURE — 2580000003 HC RX 258: Performed by: ANESTHESIOLOGY

## 2022-10-19 PROCEDURE — 2709999900 HC NON-CHARGEABLE SUPPLY: Performed by: INTERNAL MEDICINE

## 2022-10-19 PROCEDURE — 3700000001 HC ADD 15 MINUTES (ANESTHESIA): Performed by: INTERNAL MEDICINE

## 2022-10-19 PROCEDURE — 2500000003 HC RX 250 WO HCPCS: Performed by: NURSE ANESTHETIST, CERTIFIED REGISTERED

## 2022-10-19 PROCEDURE — 3609012400 HC EGD TRANSORAL BIOPSY SINGLE/MULTIPLE: Performed by: INTERNAL MEDICINE

## 2022-10-19 RX ORDER — SODIUM CHLORIDE, SODIUM LACTATE, POTASSIUM CHLORIDE, CALCIUM CHLORIDE 600; 310; 30; 20 MG/100ML; MG/100ML; MG/100ML; MG/100ML
INJECTION, SOLUTION INTRAVENOUS CONTINUOUS
Status: DISCONTINUED | OUTPATIENT
Start: 2022-10-19 | End: 2022-10-19 | Stop reason: HOSPADM

## 2022-10-19 RX ORDER — SODIUM CHLORIDE 0.9 % (FLUSH) 0.9 %
5-40 SYRINGE (ML) INJECTION EVERY 12 HOURS SCHEDULED
Status: DISCONTINUED | OUTPATIENT
Start: 2022-10-19 | End: 2022-10-19 | Stop reason: HOSPADM

## 2022-10-19 RX ORDER — SODIUM CHLORIDE 9 MG/ML
INJECTION, SOLUTION INTRAVENOUS PRN
Status: DISCONTINUED | OUTPATIENT
Start: 2022-10-19 | End: 2022-10-19 | Stop reason: HOSPADM

## 2022-10-19 RX ORDER — LIDOCAINE HYDROCHLORIDE 10 MG/ML
1 INJECTION, SOLUTION EPIDURAL; INFILTRATION; INTRACAUDAL; PERINEURAL
Status: DISCONTINUED | OUTPATIENT
Start: 2022-10-19 | End: 2022-10-19 | Stop reason: HOSPADM

## 2022-10-19 RX ORDER — LIDOCAINE HYDROCHLORIDE 20 MG/ML
INJECTION, SOLUTION EPIDURAL; INFILTRATION; INTRACAUDAL; PERINEURAL PRN
Status: DISCONTINUED | OUTPATIENT
Start: 2022-10-19 | End: 2022-10-19 | Stop reason: SDUPTHER

## 2022-10-19 RX ORDER — SODIUM CHLORIDE 0.9 % (FLUSH) 0.9 %
5-40 SYRINGE (ML) INJECTION PRN
Status: DISCONTINUED | OUTPATIENT
Start: 2022-10-19 | End: 2022-10-19 | Stop reason: HOSPADM

## 2022-10-19 RX ORDER — HEPARIN SODIUM (PORCINE) LOCK FLUSH IV SOLN 100 UNIT/ML 100 UNIT/ML
500 SOLUTION INTRAVENOUS ONCE
Status: COMPLETED | OUTPATIENT
Start: 2022-10-19 | End: 2022-10-19

## 2022-10-19 RX ORDER — PROPOFOL 10 MG/ML
INJECTION, EMULSION INTRAVENOUS PRN
Status: DISCONTINUED | OUTPATIENT
Start: 2022-10-19 | End: 2022-10-19 | Stop reason: SDUPTHER

## 2022-10-19 RX ORDER — HEPARIN SODIUM (PORCINE) LOCK FLUSH IV SOLN 100 UNIT/ML 100 UNIT/ML
500 SOLUTION INTRAVENOUS PRN
Status: DISCONTINUED | OUTPATIENT
Start: 2022-10-19 | End: 2022-10-19 | Stop reason: HOSPADM

## 2022-10-19 RX ADMIN — SODIUM CHLORIDE, POTASSIUM CHLORIDE, SODIUM LACTATE AND CALCIUM CHLORIDE: 600; 310; 30; 20 INJECTION, SOLUTION INTRAVENOUS at 09:27

## 2022-10-19 RX ADMIN — PROPOFOL 200 MG: 10 INJECTION, EMULSION INTRAVENOUS at 10:52

## 2022-10-19 RX ADMIN — LIDOCAINE HYDROCHLORIDE 80 MG: 20 INJECTION, SOLUTION EPIDURAL; INFILTRATION; INTRACAUDAL; PERINEURAL at 10:52

## 2022-10-19 RX ADMIN — Medication 500 UNITS: at 12:00

## 2022-10-19 RX ADMIN — SODIUM CHLORIDE, PRESERVATIVE FREE 10 ML: 5 INJECTION INTRAVENOUS at 11:59

## 2022-10-19 ASSESSMENT — ENCOUNTER SYMPTOMS
DIARRHEA: 0
BACK PAIN: 0
ABDOMINAL DISTENTION: 1
APNEA: 0
COUGH: 0
VOMITING: 0
SINUS PAIN: 0
WHEEZING: 0
TROUBLE SWALLOWING: 0
CONSTIPATION: 0
ABDOMINAL PAIN: 0
NAUSEA: 0
RHINORRHEA: 0
SHORTNESS OF BREATH: 1
CHEST TIGHTNESS: 0
SORE THROAT: 0
SINUS PRESSURE: 0
SHORTNESS OF BREATH: 0

## 2022-10-19 ASSESSMENT — PAIN - FUNCTIONAL ASSESSMENT: PAIN_FUNCTIONAL_ASSESSMENT: 0-10

## 2022-10-19 ASSESSMENT — LIFESTYLE VARIABLES: SMOKING_STATUS: 1

## 2022-10-19 NOTE — DISCHARGE INSTRUCTIONS
EGD DISCHARGE INSTRUCTIONS    Activity:  Rest today. No driving, operating machinery, or making any important decisions today. May resume normal activity tomorrow. Diet:  Following EGD eat slowly, chew food well, cut food into small pieces-Avoid greasy, spicy, \"crunchy\" foods X 48 hours. Call your Doctor if you have any of the following:  -Passing blood rectally or vomiting blood (it may be red or black). -Persistent nausea/vomiting  -Severe abdominal or chest pain not relieved with passing gas  -Fever of 100 degrees or more  -Redness or swelling at the IV site  -Severe sore throat or neck pain   -SEVERE CHEST PAIN OR SHORTNESS OF BREATH-CALL 911      Sedation or General Anesthesia, Adult  Care After  Refer to this sheet in the next 24 hours. These instructions provide you with information on caring for yourself after your procedure. Your caregiver may also give you more specific instructions. Your treatment has been planned according to current medical practices, but problems sometimes occur. Call your caregiver if you have any problems or questions after your procedure. HOME CARE INSTRUCTIONS   Do not participate in any activities that require you to be alert or coordinated. Do not:  Drive. Swim. Ride a bicycle. Operate heavy machinery. Jairo Battiest. Use power tools. Climb ladders. Work at [employer]. Take a bath. Do not drink alcohol. Do not make any important decisions or sign legal documents. Stay with an adult. The first meal following your procedure should be light and small. Avoid solid foods if you feel sick to your stomach (nauseous) or if you throw up (vomit). Drink enough fluids to keep your urine clear or pale yellow. Only take your usual medicines or new medicines if your caregiver approves them. Only take over-the-counter or prescription medicines for pain, discomfort, or fever as directed by your caregiver. Keep all follow-up appointments as directed by your caregiver.   SEEK IMMEDIATE MEDICAL CARE IF:   You are not feeling normal or behaving normally after 24 hours. You have persistent nausea and vomiting. You are unable to drink fluids or eat food. You have difficulty urinating. You have difficulty breathing or speaking. You have blue or gray skin. There is difficulty waking or you cannot be woken up. You have heavy bleeding, redness, or a lot of swelling where the sedative or anesthesia entered your skin (intravenous site). You have a rash. MAKE SURE YOU:  Understand these instructions. Will watch your condition. Will get help right away if you are not doing well or get worse. Document Released: 12/18/2006 Document Revised: 06/18/2013 Document Reviewed: 04/17/2013  [facility] Patient Information ©2013 Jennifer. Esophageal Varices: Care Instructions  Your Care Instructions     Esophageal varices (say \"ee-sof-uh-GUSTAVO-ul MUZZ-hl-fzzz\") are veins in your esophagus that are bigger than normal. Your esophagus is a tube. It carries food from your throat to your stomach. These veins get big because of extra pressure. This pressure makes the walls of the veins weak. Then they can rupture and cause very serious bleeding. This problem is usually found in people who have serious liver disease. Treatments include medicines and procedures to help lower the pressure in the veins. Talk with your doctor about the best treatment for you. If you have bleeding from this problem, there is a risk that it will happen again. In this case, it's important to go back and follow up with your doctor. Follow-up care is a key part of your treatment and safety. Be sure to make and go to all appointments, and call your doctor if you are having problems. It's also a good idea to know your test results and keep a list of the medicines you take. How can you care for yourself at home? Be safe with medicines. Take your medicines exactly as prescribed.  Call your doctor if you think you are having a problem with your medicine. You will get more details on the specific medicines your doctor prescribes. Talk to your doctor before you take any other medicines. These include over-the-counter medicines, vitamins, and herbal products. Avoid aspirin, ibuprofen (Advil, Motrin), and naproxen (Aleve). These can cause sores in your stomach or esophagus. Do not drink alcohol. It increases your risk of bleeding. It can also make liver damage worse. Tell your doctor if you need help to quit. Counseling, support groups, and sometimes medicines can help you stay sober. When should you call for help? Call 911 anytime you think you may need emergency care. For example, call if:    You have trouble breathing. You vomit blood or what looks like coffee grounds. Call your doctor now or seek immediate medical care if:    You feel very sleepy or confused. You have new or worse belly pain. You have a fever. There is a new or increasing yellow tint to your skin or the whites of your eyes. You have any abnormal bleeding, such as:  Nosebleeds. Vaginal bleeding that is different (heavier, more frequent, at a different time of the month) than what you are used to. Bloody or black stools, or rectal bleeding. Bloody or pink urine. Watch closely for changes in your health, and be sure to contact your doctor if:    You have any problems. Your belly is getting bigger. You are gaining weight. Where can you learn more? Go to https://[email or web address].[email or web address] org and sign in to your e[employer] Solar account. Enter Z547 in the eCoast box to learn more about \"Esophageal Varices: Care Instructions. \"     If you do not have an account, please click on the \"Sign Up Now\" link. Current as of: June 6, 2022               Content Version: 13.4  © 2436-6510 Healthwise, [employer]. Care instructions adapted under license by [facility] ([facility]).  If you have questions about a medical condition or this instruction, always ask your healthcare professional. [address] any warranty or liability for your use of this information. Peptic Ulcer Disease: Care Instructions  Overview     Peptic ulcers are sores on the inside of the stomach. Or they may be on the inside of the small intestine (such as a duodenal ulcer). They are most often caused by an infection with bacteria or from use of nonsteroidal anti-inflammatory drugs (NSAIDs). NSAIDs include aspirin, ibuprofen (Advil), and naproxen (Aleve). Your doctor may have prescribed medicine to reduce stomach acid. You also may need to take antibiotics if your peptic ulcers are caused by an infection. You can help yourself heal and keep ulcers from coming back. You can do this by making some changes in your lifestyle. Quit smoking. Limit alcohol. Follow-up care is a key part of your treatment and safety. Be sure to make and go to all appointments, and call your doctor if you are having problems. It's also a good idea to know your test results and keep a list of the medicines you take. How can you care for yourself at home? Be safe with medicines. Take your medicines exactly as prescribed. Call your doctor if you think you are having a problem with your medicine. Do not take aspirin or other NSAIDs such as ibuprofen (Advil or Motrin) or naproxen (Aleve). Ask your doctor what you can take for pain. Do not smoke. Smoking can make ulcers worse. If you need help quitting, talk to your doctor about stop-smoking programs and medicines. These can increase your chances of quitting for good. Drink in moderation or avoid drinking alcohol. Eat a balanced diet of small, frequent meals. See a dietitian if you need help planning your meals. When should you call for help? Call 911  anytime you think you may need emergency care. For example, call if:    You vomit blood or what looks like coffee grounds. You pass maroon or very bloody stools. Call your doctor now or seek immediate medical care if:    You have new or worse belly pain. Your stools are black and look like tar, or they have streaks of blood. You vomit. Watch closely for changes in your health, and be sure to contact your doctor if:    You do not get better as expected. Where can you learn more? Go to https://chpepiceweb.[email or web address] org and sign in to your Nuvilex account. Enter Y386 in the Evrent box to learn more about \"Peptic Ulcer Disease: Care Instructions. \"     If you do not have an account, please click on the \"Sign Up Now\" link. Current as of: June 6, 2022               Content Version: 13.4  © 2006-2022 Healthwise, Incorporated. Care instructions adapted under license by [facility]. If you have questions about a medical condition or this instruction, always ask your healthcare professional. [place] [address] any warranty or liability for your use of this information. Hiatal Hernia: Care Instructions  Your Care Instructions  A hiatal hernia occurs when part of the stomach bulges into the chest cavity. A hiatal hernia may allow stomach acid and juices to back up into the esophagus (acid reflux). This can cause a feeling of burning, warmth, heat, or pain behind the breastbone. This feeling may often occur after you eat, soon after you lie down, or when you bend forward, and it may come and go. You also may have a sour taste in your mouth. These symptoms are commonly known as heartburn or reflux. But not all hiatal hernias cause symptoms. Follow-up care is a key part of your treatment and safety. Be sure to make and go to all appointments, and call your doctor if you are having problems. It's also a good idea to know your test results and keep a list of the medicines you take. How can you care for yourself at home? Take your medicines exactly as prescribed.  Call your doctor if you think you are having a problem with your medicine. Do not take aspirin or other nonsteroidal anti-inflammatory drugs (NSAIDs), such as ibuprofen (Advil, Motrin) or naproxen (Aleve), unless your doctor says it is okay. Ask your doctor what you can take for pain. Your doctor may recommend over-the-counter medicine. For mild or occasional indigestion, antacids such as Tums, Gaviscon, Maalox, or Mylanta may help. Your doctor also may recommend over-the-counter acid reducers, such as famotidine (Pepcid AC), cimetidine (Tagamet HB), or omeprazole (Prilosec). Read and follow all instructions on the label. If you use these medicines often, talk with your doctor. Change your eating habits. It's best to eat several small meals instead of two or three large meals. After you eat, wait 2 to 3 hours before you lie down. Late-night snacks aren't a good idea. Avoid foods that make your symptoms worse. These may include chocolate, mint, alcohol, pepper, spicy foods, high-fat foods, or drinks with caffeine in them, such as tea, coffee, genesis, or energy drinks. If your symptoms are worse after you eat a certain food, you may want to stop eating it to see if your symptoms get better. Do not smoke or chew tobacco.  If you get heartburn at night, raise the head of your bed 6 to 8 inches by putting the frame on blocks or placing a foam wedge under the head of your mattress. (Adding extra pillows does not work.)  Do not wear tight clothing around your middle. Lose weight if you need to. Losing just 5 to 10 pounds can help. When should you call for help? Call your doctor now or seek immediate medical care if:    You have new or worse belly pain. You are vomiting. Watch closely for changes in your health, and be sure to contact your doctor if:    You have new or worse symptoms of indigestion. You have trouble or pain swallowing. You are losing weight. You do not get better as expected. Where can you learn more?   Go to https://[email or web address].[email or web address] and sign in to your Fresh Directt account. Enter B632 in the [place] box to learn more about \"Hiatal Hernia: Care Instructions. \"     If you do not have an account, please click on the \"Sign Up Now\" link. Current as of: June 6, 2022               Content Version: 13.4  © 6633-2917 [facility]. Care instructions adapted under license by [facility] ([facility]). If you have questions about a medical condition or this instruction, always ask your healthcare professional. [place] [address] any warranty or liability for your use of this information.

## 2022-10-19 NOTE — H&P (VIEW-ONLY)
HISTORY and Treinta ALAYNA [address]       NAME:  Hardik Yang  MRN: [id number]   YOB: 1960   Date: 10/19/2022   Age: 60 y.o. Gender: male       COMPLAINT AND PRESENT HISTORY:   Hardik Yang  is a 60 y.o. male presenting today for EGD BIOPSY as r/t GI BLEED. Pt states he had this procedure done on 9/14 and had some areas cauterized, states today is just a \"recheck. \" He denies any bloody or tarry stools, N/V/D/C. He does have hx of liver cirrhosis with ascites. Last paracentesis was last Friday. Pt has a PMHX significant for SOB, HTN, HLD        NPO since midnight. Pt does not wear dentures. Pt denies any hx of MRSA infection  Pt not currently taking any blood thinners or anticoagulants  Pt denies any personal or FHx of complications with anesthesia. Pt denies any acute symptoms of illness at this time including no SOB, CP, fever, URI or UTI symptoms. RECENT IMAGING    IR US GUIDED PARACENTESIS    Result Date: 10/14/2022  Successful ultrasound-guided therapeutic paracentesis. IR US GUIDED PARACENTESIS    Result Date: 10/7/2022  Successful paracentesis     IR US GUIDED PARACENTESIS    Result Date: 9/30/2022  Technically successful US guided large volume paracentesis with removal of 10.7 L of fluid as described above. The patient received 75 grams of Albumin IV during and post procedure. COMPLICATIONS: The patient tolerated the procedure well without any immediate complications. IR US GUIDED PARACENTESIS    Result Date: 9/23/2022  Successful ultrasound-guided paracentesis.         PAST MEDICAL HISTORY     Past Medical History:   Diagnosis Date    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 ([address]     Alcoholic cirrhosis of liver with ascites ([address]     Anemia 04/13/2022    Anxiety     Arthritis     Back pain, chronic     dr. Callum Cali, orthopedic, every 3-4 months, gets steroid injection    Lezama esophagus     BPH (benign prostatic hypertrophy)     Cholelithiasis     Cirrhosis ([address] COVID-19 12/2020    pt reports he had a positive test while at [facility] in 2020, was asymptomatic    COVID-19 vaccine series completed 5/20/2021, 6/22/2021    Moderna 5/20/2021, 6/22/2021    DDD (degenerative disc disease), lumbar     Depression     Esophageal cancer ([facility]     INVASIVE ADENOCARCINOMA ARISING IN TUBULAR ADENOMA WITH HIGH GRADE DYSPLASIA, ASSOCIATED WITH FOCAL INTESTINAL METAPLASIA     Esophageal varices (HCC)     Fatty liver     GERD (gastroesophageal reflux disease)     Hep C w/o coma, chronic ([address]     History of alcohol abuse     6-12 beers a day; quit drinking 2019    History of blood transfusion     History of colon polyps 2016    History of tobacco abuse     [place] (hard of hearing)     Hyperlipidemia     Hypertension     Hyponatremia 07/20/2016    Port-A-Cath in place     right upper chest    Portal hypertension ([facility]     Sciatica     Secondary esophageal varices ([facility] 06/07/2022    Shortness of breath     Spinal stenosis     Stomach ulcer     hx of    Tubular adenoma of colon 2016, 2018    Vitamin D deficiency     Wears glasses        SURGICAL HISTORY       Past Surgical History:   Procedure Laterality Date    BUNIONECTOMY      twice on right side    BUNIONECTOMY Left     CARPAL TUNNEL RELEASE Right     COLONOSCOPY      at age 35    COLONOSCOPY  10/05/2016    polyps-pathology tubular adenoma, and abnormal looking mucosa right colon-pathology-tubular adenoma    COLONOSCOPY N/A 03/30/2018    COLONOSCOPY POLYPECTOMY COLD BIOPSY performed by Hemant Capellan MD at [address],[facility] [place] [address]  03/30/2018    Small polyp in the sigmoid colon and excised with biopsy forceps--tubular adenoma    COLONOSCOPY N/A 04/16/2022    COLONOSCOPY POLYPECTOMY performed by Hemant Capellan MD at [facility], COLON, DIAGNOSTIC      EGD    IR PORT PLACEMENT EQUAL OR GREATER THAN 5 YEARS  04/19/2021    IR PORT PLACEMENT EQUAL OR GREATER THAN 5 YEARS 4/19/2021 [facility] SPECIAL PROCEDURES    KNEE SURGERY Left     cyst removed    NASAL SEPTUM SURGERY      NERVE BLOCK Right 11/23/2020    NERVE BLOCK RIGHT CERVICAL STEROID INJECTION  C3-C6 performed by Leah Godoy MD at [address] [facility]  01/04/2016    steroid injection C7 T1    OTHER SURGICAL HISTORY  11/21/2016    Bilateral Lumbar CACHORRO L4-L5 injections    OTHER SURGICAL HISTORY  12/19/2016    lumbar steroid injection    OTHER SURGICAL HISTORY  09/28/2018    BILATERAL L5 CACHORRO (N/A Back)    OTHER SURGICAL HISTORY Right 11/23/2020    cervical injection    PAIN MANAGEMENT PROCEDURE Left 07/09/2020    EPIDURAL STEROID INJECTION LEFT L4 L5 performed by Leah Godoy MD at [facility] Left 07/20/2020    LEFT L4 L5 EPIDURAL STEROID INJECTION performed by Leah Godoy MD at [facility] Bilateral 08/17/2020    LUMBAR FACET BILATERAL L2-L5 performed by Leah Godoy MD at [facility] Bilateral 12/07/2020    NERVE BLOCK BILATERAL LUMBAR MEDIAL BRANCH L2-L5 performed by Leah Godoy MD at [address] [place] [address] [facility] AA&/STRD TFRML EPI LUMBAR/SACRAL 1 LEVEL Bilateral 09/06/2018    BILATERAL L5 CACHORRO performed by Leah Godoy MD at [facility] AA&/STRD TFRML EPI LUMBAR/SACRAL 1 LEVEL N/A 09/28/2018    BILATERAL L5 CACHORRO performed by Leah Godoy MD at [address] [place] [address] N/CARPAL TUNNEL SURG Right 08/29/2017    CARPAL TUNNEL RELEASE RIGHT performed by Korey Scherer MD at [address] [place] [address] N/CARPAL TUNNEL SURG Left 10/31/2017    CARPAL TUNNEL RELEASE performed by Korey Scherer MD at [address] [place] [address] 12/29/2020    EGD BIOPSY performed by Rolando Jeter MD at [address] [place] [address] 02/02/2021    EGD BIOPSY and spot marking performed by Romel Murray MD at [address] [place] [address] 02/12/2021    ENDOSCOPIC ULTRASOUND, EGD performed by Myron Acosta MD at [address] [facility] [address] ENDOSCOPY  2021    EGD DIAGNOSTIC ONLY performed by Colletta Couch, MD at [facility]    UPPER GASTROINTESTINAL ENDOSCOPY N/A 2021    EGD BIOPSY performed by Luis Cisneros MD at [address] [place] [address] N/A 2022    EGD BIOPSY performed by Luis Cisneros MD at [address] [place] [address] N/A 04/15/2022    EGD ESOPHAGOGASTRODUODENOSCOPY performed by Kira Ambriz MD at [address] [place] [address] N/A 2022    EGD BAND LIGATION performed by Victoriano Odom MD at [address] [place] [address] N/A 2022    EGD ESOPHAGOGASTRODUODENOSCOPY performed by Victoriano Odom MD at [address] [place] [address] N/A 2022    EGD ESOPHAGOGASTRODUODENOSCOPY ENDOSCOPIC APC AT [place] [address] performed by Brandi Cuellar MD at [address] [facility], [facility]       Family History   Problem Relation Age of Onset    Cancer Mother         pancreatic    Cancer Father         bone    Diabetes Sister     Asthma Brother        SOCIAL HISTORY       Social History     Socioeconomic History    Marital status: Single   Tobacco Use    Smoking status: Former     Packs/day: 1.00     Years: 45.00     Pack years: 45.00     Types: Cigarettes     Quit date: 2017     Years since quittin.7    Smokeless tobacco: Never   Vaping Use    Vaping Use: Never used   Substance and Sexual Activity    Alcohol use: Not Currently     Comment: Quit alcohol in 2019- heavier drinking prior to quitting    Drug use: Not Currently     Frequency: 1.0 times per week     Types: Cocaine     Comment: Cocaine- stopped spring 2016    Sexual activity: Yes     Partners: Female   Social History Narrative     in the past, retired     Social Determinants of Health     Financial Resource Strain: Low Risk     Difficulty of Paying Living Expenses: Not hard at [facility] Insecurity: No Food Insecurity    Worried About Running Out of Food in the Last Year: Never true    Ran Out of Food in the Last Year: Never true   Physical Activity: Inactive    Days of Exercise per Week: 0 days    Minutes of Exercise per Session: 0 min           REVIEW OF SYSTEMS      No Known Allergies    No current facility-administered medications on file prior to encounter. Current Outpatient Medications on File Prior to Encounter   Medication Sig Dispense Refill    nadolol (CORGARD) 20 MG tablet take 1 tablet by mouth once daily 30 tablet 2    midodrine (PROAMATINE) 5 MG tablet Take 2 tablets by mouth in the morning and 2 tablets at noon and 2 tablets before bedtime. 90 tablet 3    furosemide (LASIX) 20 MG tablet Take 1 tablet by mouth 2 times daily 60 tablet 3    spironolactone (ALDACTONE) 100 MG tablet Take 1 tablet by mouth every evening (Patient taking differently: Take 100 mg by mouth 3 times daily) 30 tablet 1    ondansetron (ZOFRAN-ODT) 4 MG disintegrating tablet dissolve 1 tablet by mouth every 8 hours if needed for nausea and vomiting 10 tablet 0    lactulose (CHRONULAC) 10 GM/15ML solution take 30 milliliters by mouth three times a day 473 mL 1    FLUoxetine (PROZAC) 20 MG capsule Take 1 capsule by mouth daily 30 capsule 3    atorvastatin (LIPITOR) 20 MG tablet Take 1 tablet by mouth nightly 30 tablet 3        Review of Systems   Constitutional:  Negative for chills, diaphoresis, fatigue and fever. HENT:  Negative for congestion, dental problem, ear pain, postnasal drip, rhinorrhea, sinus pressure, sinus pain, sore throat and trouble swallowing. Respiratory:  Negative for apnea, cough, chest tightness, shortness of breath and wheezing. Cardiovascular:  Negative for chest pain, palpitations and leg swelling. Gastrointestinal:  Positive for abdominal distention. Negative for abdominal pain, constipation, diarrhea, nausea and vomiting.         See hpi    Genitourinary:  Negative for dysuria, flank pain, frequency and hematuria. Musculoskeletal:  Negative for back pain, joint swelling and myalgias. Skin:  Negative for rash and wound. Neurological:  Negative for dizziness, weakness, numbness and headaches. Hematological:  Does not bruise/bleed easily. Psychiatric/Behavioral:  Negative for agitation and confusion. The patient is not nervous/anxious. See HPI    GENERAL PHYSICAL EXAM:     Vitals: See nurse flow sheet     Physical Exam  Constitutional:       General: He is not in acute distress. Appearance: Normal appearance. He is well-developed and normal weight. He is not ill-appearing or toxic-appearing. HENT:      Head: Normocephalic and atraumatic. Mouth/Throat:      Mouth: Mucous membranes are dry. Pharynx: Oropharynx is clear. No oropharyngeal exudate or posterior oropharyngeal erythema. Eyes:      Extraocular Movements: Extraocular movements intact. Conjunctiva/sclera: Conjunctivae normal.      Pupils: Pupils are equal, round, and reactive to light. Comments: +glasses    Cardiovascular:      Rate and Rhythm: Normal rate and regular rhythm. Pulses: Normal pulses. Heart sounds: Normal heart sounds. No murmur heard. No friction rub. No gallop. Pulmonary:      Effort: Pulmonary effort is normal.      Breath sounds: Normal breath sounds. No wheezing. Abdominal:      General: Bowel sounds are normal. There is distension. Palpations: Abdomen is soft. Tenderness: There is no abdominal tenderness. There is no guarding or rebound. Comments: Ascites. Hyperactive BS   Musculoskeletal:         General: No swelling. Normal range of motion. Cervical back: Normal range of motion and neck supple. No rigidity or tenderness. Right lower leg: No edema. Left lower leg: No edema. Skin:     General: Skin is warm and dry. Findings: Bruising present. No erythema.    Neurological:      General: No focal deficit present. Mental Status: He is alert and oriented to person, place, and time. Mental status is at baseline. Sensory: No sensory deficit. Psychiatric:         Mood and Affect: Mood normal.         Behavior: Behavior normal.         Thought Content:  Thought content normal.         Judgment: Judgment normal.                                                                                       PROVISIONAL DIAGNOSES / SURGERY:      EGD BIOPSY     GI BLEED    Patient Active Problem List    Diagnosis Date Noted    GI bleed 09/13/2022    Secondary esophageal varices ([address] 06/07/2022    Esophageal polyp 06/07/2022    Drop in hemoglobin 06/03/2022    Portal hypertension ([address]     Shortness of breath     Ascites 04/26/2022    Acute kidney failure, unspecified ([address] 04/21/2022    Muscle weakness (generalized) 07/28/2016    Other abnormalities of gait and mobility 07/28/2016    Anemia 04/13/2022    Acute kidney injury ([address] 04/13/2022    Esophageal adenocarcinoma ([address]     Low hemoglobin 12/20/2021    Symptomatic anemia, microcytic, acute 12/20/2021    Hypotension 12/20/2021    Former smoker, 50+ pack years, quit 2016 12/20/2021    HLD (hyperlipidemia) 12/20/2021    Abnormal findings on diagnostic imaging of spine 12/14/2021    Cervical spinal stenosis 12/14/2021    Spinal stenosis of lumbar region with neurogenic claudication 12/14/2021    Severe comorbid illness 11/30/2021    Gait instability 11/30/2021    Current smoker 04/05/2021    COVID-19 02/23/2021    Anxiety 02/23/2021    Malignant neoplasm of lower third of esophagus ([address]     Hypocalcemia 12/26/2020    Hypophosphatemia 12/26/2020    Gastrointestinal hemorrhage with melena     Alcohol abuse     Altered mental status     Acute gastrointestinal bleeding 12/23/2020    Thrombocytopenia ([address] 12/23/2020    Hepatitis C virus infection resolved after antiviral drug therapy 12/23/2020    Cervical facet syndrome 11/23/2020    Lumbar facet arthropathy 08/17/2020 Elevated LFTs 08/12/2020    Seasonal allergies 08/12/2020    S/P epidural steroid injection 08/05/2020    Essential hypertension 04/24/2019    Recurrent major depressive disorder in partial remission ([address] 04/24/2019    Pure hypercholesterolemia 02/04/2019    Hypokalemia 02/04/2019    Vitamin D deficiency 09/20/2017    History of hepatitis C 09/11/2017    Ganglion cyst 05/31/2017    Carpal tunnel syndrome of right wrist 05/31/2017    Tinnitus 03/23/2017    Eustachian tube dysfunction 03/23/2017    Lumbar radiculitis 11/08/2016    Lumbar disc herniation 11/08/2016    Gynecomastia, male 10/26/2016    Depression 10/13/2016    Vertebrogenic low back pain 10/06/2016    DDD (degenerative disc disease), lumbar 10/06/2016    Hepatic cirrhosis ([address] 09/15/2016    Psychophysiologic insomnia 09/14/2016    Cirrhosis ([address]     Hep C w/o coma, chronic (HCC)     Fatty liver     Calculus of gallbladder without cholecystitis 08/10/2016    Chronic viral hepatitis B without delta agent and without coma ([address] 07/22/2016    Hypomagnesemia     Alcohol withdrawal syndrome without complication ([address] 91/83/7264    Cervical radicular pain 01/04/2016    Tubular adenoma of colon 01/01/2016    History of colon polyps 01/01/2016    Back pain, chronic 04/19/2012    Hearing difficulty 04/19/2012    GERD (gastroesophageal reflux disease) 04/19/2012               MASSIMO Pike - CNP on 10/19/2022 at 8:28 AM

## 2022-10-19 NOTE — ANESTHESIA POSTPROCEDURE EVALUATION
Department of Anesthesiology  Postprocedure Note    Patient: Val Vargas  MRN: [id number]  YOB: 1960  Date of evaluation: 10/19/2022      Procedure Summary     Date: 10/19/22 Room / Location: [place] [address] 03 / [facility]    Anesthesia Start: 1039 Anesthesia Stop: 1108    Procedure: EGD BIOPSY (Esophagus) Diagnosis:       Gastrointestinal hemorrhage, unspecified gastrointestinal hemorrhage type      (GI BLEED)    Surgeons: Kristy Cooper MD Responsible Provider: Gabrielle Narayan MD    Anesthesia Type: general ASA Status: 3          Anesthesia Type: No value filed.     Yen Phase I:      Yen Phase II: Yen Score: 5      Anesthesia Post Evaluation    Comments: POST- ANESTHESIA EVALUATION       Pt Name: Val Vargas  MRN: [id number]  YOB: 1960  Date of evaluation: 10/19/2022  Time:  12:05 PM      /67   Pulse 95   Temp 97.7 °F (36.5 °C) (Infrared)   Resp 18   Ht 5' 10\" (1.778 m)   Wt 180 lb (81.6 kg)   SpO2 99%   BMI 25.83 kg/m²      Consciousness Level  Awake  Cardiopulmonary Status  Stable  Pain Adequately Treated YES  Nausea / Vomiting  NO  Adequate Hydration  YES  Anesthesia Related Complications NONE      Electronically signed by Gabrielle Narayan MD on 10/19/2022 at 12:05 PM

## 2022-10-19 NOTE — ANESTHESIA PRE PROCEDURE
Department of Anesthesiology  Preprocedure Note       Name:  Genevieve Harrison   Age:  60 y.o.  :  1960                                          MRN:  [id number]         Date:  10/19/2022      Surgeon: Maylin López):  Deacon Plata MD    Procedure: Procedure(s):  EGD BIOPSY    Medications prior to admission:   Prior to Admission medications    Medication Sig Start Date End Date Taking? Authorizing Provider   FEROSUL 325 (65 Fe) MG tablet take 1 tablet by mouth twice a day 10/7/22   VASU Menchaca   nadolol (CORGARD) 20 MG tablet take 1 tablet by mouth once daily 22   Anna Loaiza APRN - NP   midodrine (PROAMATINE) 5 MG tablet Take 2 tablets by mouth in the morning and 2 tablets at noon and 2 tablets before bedtime. 22   MASSIMO White - NP   furosemide (LASIX) 20 MG tablet Take 1 tablet by mouth 2 times daily 22   Anna Loaiza APRN - NP   spironolactone (ALDACTONE) 100 MG tablet Take 1 tablet by mouth every evening  Patient taking differently: Take 100 mg by mouth 3 times daily 22   MASSIMO White NP   ondansetron (ZOFRAN-ODT) 4 MG disintegrating tablet dissolve 1 tablet by mouth every 8 hours if needed for nausea and vomiting 22   VASU Menchaca   lactulose [facility]) 10 GM/15ML solution take 30 milliliters by mouth three times a day 22   VASU Menchaca   FLUoxetine (PROZAC) 20 MG capsule Take 1 capsule by mouth daily 22   Shelli Isaac MD   atorvastatin (LIPITOR) 20 MG tablet Take 1 tablet by mouth nightly 22   Shelli Isaac MD       Current medications:    No current facility-administered medications for this visit. No current outpatient medications on file.      Facility-Administered Medications Ordered in Other Visits   Medication Dose Route Frequency Provider Last Rate Last Admin    lidocaine PF 1 % injection 1 mL  1 mL IntraDERmal Once PRN Linda Deal MD        lactated ringers infusion   IntraVENous Continuous Gertrude Bosworth, MD        sodium chloride flush 0.9 % injection 5-40 mL  5-40 mL IntraVENous 2 times per day Gertrude Bosworth, MD        sodium chloride flush 0.9 % injection 5-40 mL  5-40 mL IntraVENous PRN Gertrude Bosworth, MD        0.9 % sodium chloride infusion   IntraVENous PRN Gertrude Bosworth, MD        heparin flush 100 UNIT/ML injection 500 Units  500 Units IntraCATHeter PRN Leah Hernandez MD           Allergies:  No Known Allergies    Problem List:    Patient Active Problem List   Diagnosis Code    Back pain, chronic M54.9, G89.29    Hearing difficulty H91.90    GERD (gastroesophageal reflux disease) K21.9    Cervical radicular pain M54.12    Alcohol withdrawal syndrome without complication (HCC) G31.156    Hypomagnesemia E83.42    Chronic viral hepatitis B without delta agent and without coma (HCC) B18.1    Calculus of gallbladder without cholecystitis K80.20    Hep C w/o coma, chronic (HCC) B18.2    Fatty liver K76.0    Psychophysiologic insomnia F51.04    Cirrhosis (HCC) K74.60    Hepatic cirrhosis ([address] K74.60    Vertebrogenic low back pain M54.51    DDD (degenerative disc disease), lumbar M51.36    Depression F32. A    Tubular adenoma of colon D12.6    History of colon polyps Z86.010    Gynecomastia, male N58    Lumbar radiculitis M54.16    Lumbar disc herniation M51.26    Tinnitus H93.19    Eustachian tube dysfunction H69.80    Ganglion cyst M67.40    Carpal tunnel syndrome of right wrist G56.01    History of hepatitis C Z86.19    Vitamin D deficiency E55.9    Pure hypercholesterolemia E78.00    Hypokalemia E87.6    Essential hypertension I10    Recurrent major depressive disorder in partial remission (HCC) F33.41    S/P epidural steroid injection Z92.241    Elevated LFTs R79.89    Seasonal allergies J30.2    Lumbar facet arthropathy M47.816    Cervical facet syndrome M47.812    Acute gastrointestinal bleeding K92.2    Thrombocytopenia (HCC) D69.6    Hepatitis C virus infection resolved after antiviral drug therapy Z86.19    Gastrointestinal hemorrhage with melena K92.1    Alcohol abuse F10.10    Altered mental status R41.82    Hypocalcemia E83.51    Hypophosphatemia E83.39    Malignant neoplasm of lower third of esophagus (HCC) C15.5    COVID-19 U07.1    Anxiety F41.9    Current smoker F17.200    Severe comorbid illness R69    Gait instability R26.81    Abnormal findings on diagnostic imaging of spine R93.7    Cervical spinal stenosis M48.02    Spinal stenosis of lumbar region with neurogenic claudication M48.062    Low hemoglobin D64.9    Symptomatic anemia, microcytic, acute D64.9    Hypotension I95.9    Former smoker, 50+ pack years, quit 2016 Z87.891    HLD (hyperlipidemia) E78.5    Esophageal adenocarcinoma (HCC) C15.9    Anemia D64.9    Acute kidney injury ([address] N17.9    Ascites R18.8    Shortness of breath R06.02    Drop in hemoglobin R71.0    Portal hypertension (HCC) K76.6    Secondary esophageal varices (HCC) I85.10    Esophageal polyp K22.81    Acute kidney failure, unspecified (HCC) N17.9    Muscle weakness (generalized) M62.81    Other abnormalities of gait and mobility R26.89    GI bleed K92.2       Past Medical History:        Diagnosis Date    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 ([address]     Alcoholic cirrhosis of liver with ascites ([address]     Anemia 04/13/2022    Anxiety     Arthritis     Back pain, chronic     dr. Maura Sears, orthopedic, every 3-4 months, gets steroid injection    Lezama esophagus     BPH (benign prostatic hypertrophy)     Cholelithiasis     Cirrhosis ([address]     COVID-19 12/2020    pt reports he had a positive test while at [facility] in 2020, was asymptomatic    COVID-19 vaccine series completed 5/20/2021, 6/22/2021    Moderna 5/20/2021, 6/22/2021    DDD (degenerative disc disease), lumbar     Depression     Esophageal cancer (HCC)     INVASIVE ADENOCARCINOMA ARISING IN TUBULAR ADENOMA WITH HIGH GRADE DYSPLASIA, ASSOCIATED WITH FOCAL INTESTINAL METAPLASIA     Esophageal varices (HCC)     Fatty liver     GERD (gastroesophageal reflux disease)     Hep C w/o coma, chronic (HCC)     History of alcohol abuse     6-12 beers a day; quit drinking 2019    History of blood transfusion     History of colon polyps 2016    History of tobacco abuse     [place] (hard of hearing)     Hyperlipidemia     Hypertension     Hyponatremia 07/20/2016    Port-A-Cath in place     right upper chest    Portal hypertension ([address]     Sciatica     Secondary esophageal varices ([address] 06/07/2022    Shortness of breath     Spinal stenosis     Stomach ulcer     hx of    Tubular adenoma of colon 2016, 2018    Vitamin D deficiency     Wears glasses        Past Surgical History:        Procedure Laterality Date    BUNIONECTOMY      twice on right side    BUNIONECTOMY Left     CARPAL TUNNEL RELEASE Right     COLONOSCOPY      at age 35    COLONOSCOPY  10/05/2016    polyps-pathology tubular adenoma, and abnormal looking mucosa right colon-pathology-tubular adenoma    COLONOSCOPY N/A 03/30/2018    COLONOSCOPY POLYPECTOMY COLD BIOPSY performed by Gwen Loyd MD at [address] [place] [address]  03/30/2018    Small polyp in the sigmoid colon and excised with biopsy forceps--tubular adenoma    COLONOSCOPY N/A 04/16/2022    COLONOSCOPY POLYPECTOMY performed by Gwen Loyd MD at [place] [address], COLON, DIAGNOSTIC      EGD    IR PORT PLACEMENT EQUAL OR GREATER THAN 5 YEARS  04/19/2021    IR PORT PLACEMENT EQUAL OR GREATER THAN 5 YEARS 4/19/2021 [facility]    KNEE SURGERY Left     cyst removed    NASAL SEPTUM SURGERY      NERVE BLOCK Right 11/23/2020    NERVE BLOCK RIGHT CERVICAL STEROID INJECTION  C3-C6 performed by Titi Bonilla MD at [address] [place] [address]  01/04/2016    steroid injection C7 T1    OTHER SURGICAL HISTORY  11/21/2016    Bilateral Lumbar CACHORRO L4-L5 injections    OTHER SURGICAL HISTORY  12/19/2016    lumbar steroid injection    OTHER SURGICAL HISTORY  09/28/2018    BILATERAL L5 CACHORRO (N/A Back)    OTHER SURGICAL HISTORY Right 11/23/2020    cervical injection    PAIN MANAGEMENT PROCEDURE Left 07/09/2020    EPIDURAL STEROID INJECTION LEFT L4 L5 performed by Bandar Andrew MD at [address] Left 07/20/2020    LEFT L4 L5 EPIDURAL STEROID INJECTION performed by Bandar Andrew MD at [address] [place] [address] Bilateral 08/17/2020    LUMBAR FACET BILATERAL L2-L5 performed by Bandar Andrew MD at [address] [place] [address] Bilateral 12/07/2020    NERVE BLOCK BILATERAL LUMBAR MEDIAL BRANCH L2-L5 performed by Bandar Andrew MD at [address]      Multiple    [place] [address] AA&/STRD TFRML EPI LUMBAR/SACRAL 1 LEVEL Bilateral 09/06/2018    BILATERAL L5 CACHORRO performed by Bandar Andrew MD at [address] AA&/STRD TFRML EPI LUMBAR/SACRAL 1 LEVEL N/A 09/28/2018    BILATERAL L5 CACHORRO performed by Bandar Andrew MD at [address] [place] [address] N/CARPAL TUNNEL SURG Right 08/29/2017    CARPAL TUNNEL RELEASE RIGHT performed by Katie Sadler MD at [address] [place] [address] N/CARPAL TUNNEL SURG Left 10/31/2017    CARPAL TUNNEL RELEASE performed by Katie Sadler MD at [facility] 12/29/2020    EGD BIOPSY performed by Angelo Painting MD at [address] [place] [address] 02/02/2021    EGD BIOPSY and spot marking performed by Steve Gruber MD at [address] [place] [address] 02/12/2021    ENDOSCOPIC ULTRASOUND, EGD performed by Ellie Arrieta MD at [address] [place] [address]  02/12/2021    EGD DIAGNOSTIC ONLY performed by Ellie Arrieta MD at [address] [place] [address] N/A 08/31/2021    EGD BIOPSY performed by Steve Gruber MD at [address] [place] [address] 2022    EGD BIOPSY performed by Marietta Munoz MD at [facility] 04/15/2022    EGD ESOPHAGOGASTRODUODENOSCOPY performed by Katherin Pepper MD at [place] [address] 2022    EGD BAND LIGATION performed by Martin Holguin MD at [address] [place] [address] N/A 2022    EGD ESOPHAGOGASTRODUODENOSCOPY performed by Martin Holguin MD at [address] [place] [address] N/A 2022    EGD ESOPHAGOGASTRODUODENOSCOPY ENDOSCOPIC APC AT [place] [address] performed by Josephine Luz MD at [facility] History:    Social History     Tobacco Use    Smoking status: Former     Packs/day: 1.00     Years: 45.00     Pack years: 45.00     Types: Cigarettes     Quit date: 2017     Years since quittin.7    Smokeless tobacco: Never   Substance Use Topics    Alcohol use: Not Currently     Comment: Quit alcohol in 2019- heavier drinking prior to quitting                                Counseling given: Not Answered      Vital Signs (Current): There were no vitals filed for this visit.                                            BP Readings from Last 3 Encounters:   10/19/22 (!) 108/57   10/14/22 121/80   10/07/22 (!) 105/50       NPO Status:                                                                                 BMI:   Wt Readings from Last 3 Encounters:   10/19/22 180 lb (81.6 kg)   10/14/22 196 lb (88.9 kg)   10/07/22 198 lb (89.8 kg)     There is no height or weight on file to calculate BMI.    CBC:   Lab Results   Component Value Date/Time    WBC 8.6 10/18/2022 12:21 PM    RBC 2.89 10/18/2022 12:21 PM    RBC 4.75 2012 11:30 AM    HGB 7.9 10/18/2022 12:21 PM    HCT 25.0 10/18/2022 12:21 PM    MCV 86.5 10/18/2022 12:21 PM    RDW 16.4 10/18/2022 12:21 PM     10/18/2022 12:21 PM     2012 11:30 AM       CMP:   Lab Results   Component Value Date/Time     10/18/2022 12:21 PM    K 4.9 10/18/2022 12:21 PM    CL 94 10/18/2022 12:21 PM    CO2 20 10/18/2022 12:21 PM    BUN 11 10/18/2022 12:21 PM    CREATININE 0.77 10/18/2022 12:21 PM    GFRAA >60 09/28/2022 01:33 PM    LABGLOM >60 10/18/2022 12:21 PM    GLUCOSE 107 10/18/2022 12:21 PM    GLUCOSE 65 04/19/2012 11:30 AM    PROT 5.8 10/18/2022 12:21 PM    CALCIUM 8.8 10/18/2022 12:21 PM    BILITOT 1.6 10/18/2022 12:21 PM    ALKPHOS 151 10/18/2022 12:21 PM    AST 40 10/18/2022 12:21 PM    ALT 22 10/18/2022 12:21 PM       POC Tests: No results for input(s): POCGLU, POCNA, POCK, POCCL, POCBUN, POCHEMO, POCHCT in the last 72 hours. Coags:   Lab Results   Component Value Date/Time    PROTIME 15.3 10/14/2022 01:00 PM    INR 1.2 10/14/2022 01:00 PM    APTT 35.0 10/14/2022 01:00 PM       HCG (If Applicable): No results found for: PREGTESTUR, PREGSERUM, HCG, HCGQUANT     ABGs: No results found for: PHART, PO2ART, OSO0MXY, RLI5DHB, BEART, W7HWAQZI     Type & Screen (If Applicable):  No results found for: LABABO, LABRH    Drug/Infectious Status (If Applicable):  Lab Results   Component Value Date/Time    HEPCAB REACTIVE 12/23/2020 05:09 PM       COVID-19 Screening (If Applicable):   Lab Results   Component Value Date/Time    COVID19 Not Detected 09/12/2022 10:32 PM    COVID19 Not Detected 07/17/2020 10:00 AM           Anesthesia Evaluation  Patient summary reviewed and Nursing notes reviewed no history of anesthetic complications:   Airway: Mallampati: II  TM distance: >3 FB   Neck ROM: full  Mouth opening: > = 3 FB   Dental: normal exam         Pulmonary:normal exam  breath sounds clear to auscultation  (+) shortness of breath:  current smoker                           Cardiovascular:    (+) hypertension (now hypotensive):, hyperlipidemia      ECG reviewed  Rhythm: regular  Rate: normal  Echocardiogram reviewed               ROS comment: Mild left ventricular hypertrophy.   Global left ventricular systolic function is normal.  Estimated LV EF 60-65 %. (12/2021)     Neuro/Psych:   (+) neuromuscular disease:, psychiatric history:depression/anxiety              ROS comment: DDD - Lumbar region  Cervical Stenosis GI/Hepatic/Renal:   (+) GERD:, PUD, hepatitis: C and B, liver disease (cirrhosis): portal hypertension, renal disease (BPH (benign prostatic hypertrophy)):,          ROS comment: H/o Esophageal Adenocarcinoma   FOBT positive  Lezama esophagus  Abdominal distension - pt admits to having Ascites in the past with drainage - last paracentesis  10/14/22. Endo/Other:    (+) blood dyscrasia (being transfused): anemia:., electrolyte abnormalities (Hyponatremia, Hypocalcemia), Cancer: Esophageal Adenocarcinoma s/p chemo and radiation. .    Cancer: Esophageal Adenocarcinoma s/p chemo and radiation. ROS comment:  History of alcohol abuse Abdominal:             Vascular: negative vascular ROS. Other Findings:             Anesthesia Plan      general     ASA 3       Induction: intravenous. MIPS: Prophylactic antiemetics administered. Anesthetic plan and risks discussed with patient. Plan discussed with CRNA.                     Shukri Way MD   10/19/2022

## 2022-10-19 NOTE — H&P
HISTORY and [id number]       NAME:  Casandra Cyr  MRN: [id number]   YOB: 1960   Date: 10/19/2022   Age: 60 y.o. Gender: male       COMPLAINT AND PRESENT HISTORY:   Casandra Cyr  is a 60 y.o. male presenting today for EGD BIOPSY as r/t GI BLEED. Pt states he had this procedure done on 9/14 and had some areas cauterized, states today is just a \"recheck. \" He denies any bloody or tarry stools, N/V/D/C. He does have hx of liver cirrhosis with ascites. Last paracentesis was last Friday. Pt has a PMHX significant for SOB, HTN, HLD        NPO since midnight. Pt does not wear dentures. Pt denies any hx of MRSA infection  Pt not currently taking any blood thinners or anticoagulants  Pt denies any personal or FHx of complications with anesthesia. Pt denies any acute symptoms of illness at this time including no SOB, CP, fever, URI or UTI symptoms. RECENT IMAGING    IR US GUIDED PARACENTESIS    Result Date: 10/14/2022  Successful ultrasound-guided therapeutic paracentesis. IR US GUIDED PARACENTESIS    Result Date: 10/7/2022  Successful paracentesis     IR US GUIDED PARACENTESIS    Result Date: 9/30/2022  Technically successful US guided large volume paracentesis with removal of 10.7 L of fluid as described above. The patient received 75 grams of Albumin IV during and post procedure. COMPLICATIONS: The patient tolerated the procedure well without any immediate complications. IR US GUIDED PARACENTESIS    Result Date: 9/23/2022  Successful ultrasound-guided paracentesis.         PAST MEDICAL HISTORY     Past Medical History:   Diagnosis Date    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 ([address]     Alcoholic cirrhosis of liver with ascites ([address]     Anemia 04/13/2022    Anxiety     Arthritis     Back pain, chronic     dr. Tanna French, orthopedic, every 3-4 months, gets steroid injection    Lezama esophagus     BPH (benign prostatic hypertrophy)     Cholelithiasis     Cirrhosis ([address] COVID-19 12/2020    pt reports he had a positive test while at [facility] in 2020, was asymptomatic    COVID-19 vaccine series completed 5/20/2021, 6/22/2021    Moderna 5/20/2021, 6/22/2021    DDD (degenerative disc disease), lumbar     Depression     Esophageal cancer ([address]     INVASIVE ADENOCARCINOMA ARISING IN TUBULAR ADENOMA WITH HIGH GRADE DYSPLASIA, ASSOCIATED WITH FOCAL INTESTINAL METAPLASIA     Esophageal varices (HCC)     Fatty liver     GERD (gastroesophageal reflux disease)     Hep C w/o coma, chronic ([address]     History of alcohol abuse     6-12 beers a day; quit drinking 2019    History of blood transfusion     History of colon polyps 2016    History of tobacco abuse     [place] (hard of hearing)     Hyperlipidemia     Hypertension     Hyponatremia 07/20/2016    Port-A-Cath in place     right upper chest    Portal hypertension ([address]     Sciatica     Secondary esophageal varices ([address] 06/07/2022    Shortness of breath     Spinal stenosis     Stomach ulcer     hx of    Tubular adenoma of colon 2016, 2018    Vitamin D deficiency     Wears glasses        SURGICAL HISTORY       Past Surgical History:   Procedure Laterality Date    BUNIONECTOMY      twice on right side    BUNIONECTOMY Left     CARPAL TUNNEL RELEASE Right     COLONOSCOPY      at age 35    COLONOSCOPY  10/05/2016    polyps-pathology tubular adenoma, and abnormal looking mucosa right colon-pathology-tubular adenoma    COLONOSCOPY N/A 03/30/2018    COLONOSCOPY POLYPECTOMY COLD BIOPSY performed by Ryan Ybarra MD at [address] [place] [address]  03/30/2018    Small polyp in the sigmoid colon and excised with biopsy forceps--tubular adenoma    COLONOSCOPY N/A 04/16/2022    COLONOSCOPY POLYPECTOMY performed by Ryan Ybarra MD at [address] [place] [address], [place], DIAGNOSTIC      EGD    IR PORT PLACEMENT EQUAL OR GREATER THAN 5 YEARS  04/19/2021    IR PORT PLACEMENT EQUAL OR GREATER THAN 5 YEARS 4/19/2021 [facility] SPECIAL PROCEDURES    KNEE SURGERY Left     cyst removed    NASAL SEPTUM SURGERY      NERVE BLOCK Right 11/23/2020    NERVE BLOCK RIGHT CERVICAL STEROID INJECTION  C3-C6 performed by Erasmo Kelsey MD at [address]  01/04/2016    steroid injection C7 T1    OTHER SURGICAL HISTORY  11/21/2016    Bilateral Lumbar CACHORRO L4-L5 injections    OTHER SURGICAL HISTORY  12/19/2016    lumbar steroid injection    OTHER SURGICAL HISTORY  09/28/2018    BILATERAL L5 CACHORRO (N/A Back)    OTHER SURGICAL HISTORY Right 11/23/2020    cervical injection    PAIN MANAGEMENT PROCEDURE Left 07/09/2020    EPIDURAL STEROID INJECTION LEFT L4 L5 performed by Erasmo Kelsey MD at [address] [place] [address] Left 07/20/2020    LEFT L4 L5 EPIDURAL STEROID INJECTION performed by Erasmo Kelsey MD at [address] [place] [address] Bilateral 08/17/2020    LUMBAR FACET BILATERAL L2-L5 performed by Erasmo Kelsey MD at [address] [place] [address] Bilateral 12/07/2020    NERVE BLOCK BILATERAL LUMBAR MEDIAL BRANCH L2-L5 performed by Erasmo Kelsey MD at [address] [place] [address] 84 AA&/STRD TFRML EPI LUMBAR/SACRAL 1 LEVEL Bilateral 09/06/2018    BILATERAL L5 CACHORRO performed by Erasmo Kelsey MD at [facility] AA&/STRD TFRML EPI LUMBAR/SACRAL 1 LEVEL N/A 09/28/2018    BILATERAL L5 CACHORRO performed by Erasmo Kelsey MD at [address] [place] [address] N/CARPAL TUNNEL SURG Right 08/29/2017    CARPAL TUNNEL RELEASE RIGHT performed by Shannen Espinosa MD at [address] [place] [address] N/CARPAL TUNNEL SURG Left 10/31/2017    CARPAL TUNNEL RELEASE performed by Shannen Espinosa MD at [place] [address] 12/29/2020    EGD BIOPSY performed by Serenity Jenkins MD at [place] [address] 02/02/2021    EGD BIOPSY and spot marking performed by Sariah French MD at [place] [address] 02/12/2021    ENDOSCOPIC ULTRASOUND, EGD performed by Kath Uribe MD at [address] ENDOSCOPY  2021    EGD DIAGNOSTIC ONLY performed by Peyton Jovel MD at [facility] Endoscopy    UPPER GASTROINTESTINAL ENDOSCOPY N/A 2021    EGD BIOPSY performed by Paco Royal MD at [address] [facility] N/A 2022    EGD BIOPSY performed by Paco Royal MD at [address] [facility] N/A 04/15/2022    EGD ESOPHAGOGASTRODUODENOSCOPY performed by Amy Hoskins MD at [address] [facility] N/A 2022    EGD BAND LIGATION performed by Chandan Yoder MD at [address] [facility] N/A 2022    EGD ESOPHAGOGASTRODUODENOSCOPY performed by Chandan Yoder MD at [address] [facility] N/A 2022    EGD ESOPHAGOGASTRODUODENOSCOPY ENDOSCOPIC APC AT [facility] performed by Virginie Robertson MD at [address] [facility] [facility]       Family History   Problem Relation Age of Onset    Cancer Mother         pancreatic    Cancer Father         bone    Diabetes Sister     Asthma Brother        SOCIAL HISTORY       Social History     Socioeconomic History    Marital status: Single   Tobacco Use    Smoking status: Former     Packs/day: 1.00     Years: 45.00     Pack years: 45.00     Types: Cigarettes     Quit date: 2017     Years since quittin.7    Smokeless tobacco: Never   Vaping Use    Vaping Use: Never used   Substance and Sexual Activity    Alcohol use: Not Currently     Comment: Quit alcohol in 2019- heavier drinking prior to quitting    Drug use: Not Currently     Frequency: 1.0 times per week     Types: Cocaine     Comment: Cocaine- stopped spring 2016    Sexual activity: Yes     Partners: Female   Social History Narrative     in the past, retired     Social Determinants of Health     Financial Resource Strain: Low Risk     Difficulty of Paying Living Expenses: Not hard at [facility] Insecurity: No Food Insecurity    Worried About Running Out of Food in the Last Year: Never true    Ran Out of Food in the Last Year: Never true   Physical Activity: Inactive    Days of Exercise per Week: 0 days    Minutes of Exercise per Session: 0 min           REVIEW OF SYSTEMS      No Known Allergies    No current facility-administered medications on file prior to encounter. Current Outpatient Medications on File Prior to Encounter   Medication Sig Dispense Refill    nadolol (CORGARD) 20 MG tablet take 1 tablet by mouth once daily 30 tablet 2    midodrine (PROAMATINE) 5 MG tablet Take 2 tablets by mouth in the morning and 2 tablets at noon and 2 tablets before bedtime. 90 tablet 3    furosemide (LASIX) 20 MG tablet Take 1 tablet by mouth 2 times daily 60 tablet 3    spironolactone (ALDACTONE) 100 MG tablet Take 1 tablet by mouth every evening (Patient taking differently: Take 100 mg by mouth 3 times daily) 30 tablet 1    ondansetron (ZOFRAN-ODT) 4 MG disintegrating tablet dissolve 1 tablet by mouth every 8 hours if needed for nausea and vomiting 10 tablet 0    lactulose (CHRONULAC) 10 GM/15ML solution take 30 milliliters by mouth three times a day 473 mL 1    FLUoxetine (PROZAC) 20 MG capsule Take 1 capsule by mouth daily 30 capsule 3    atorvastatin (LIPITOR) 20 MG tablet Take 1 tablet by mouth nightly 30 tablet 3        Review of Systems   Constitutional:  Negative for chills, diaphoresis, fatigue and fever. HENT:  Negative for congestion, dental problem, ear pain, postnasal drip, rhinorrhea, sinus pressure, sinus pain, sore throat and trouble swallowing. Respiratory:  Negative for apnea, cough, chest tightness, shortness of breath and wheezing. Cardiovascular:  Negative for chest pain, palpitations and leg swelling. Gastrointestinal:  Positive for abdominal distention. Negative for abdominal pain, constipation, diarrhea, nausea and vomiting.         See hpi    Genitourinary:  Negative for dysuria, flank pain, frequency and hematuria. Musculoskeletal:  Negative for back pain, joint swelling and myalgias. Skin:  Negative for rash and wound. Neurological:  Negative for dizziness, weakness, numbness and headaches. Hematological:  Does not bruise/bleed easily. Psychiatric/Behavioral:  Negative for agitation and confusion. The patient is not nervous/anxious. See HPI    GENERAL PHYSICAL EXAM:     Vitals: See nurse flow sheet     Physical Exam  Constitutional:       General: He is not in acute distress. Appearance: Normal appearance. He is well-developed and normal weight. He is not ill-appearing or toxic-appearing. HENT:      Head: Normocephalic and atraumatic. Mouth/Throat:      Mouth: Mucous membranes are dry. Pharynx: Oropharynx is clear. No oropharyngeal exudate or posterior oropharyngeal erythema. Eyes:      Extraocular Movements: Extraocular movements intact. Conjunctiva/sclera: Conjunctivae normal.      Pupils: Pupils are equal, round, and reactive to light. Comments: +glasses    Cardiovascular:      Rate and Rhythm: Normal rate and regular rhythm. Pulses: Normal pulses. Heart sounds: Normal heart sounds. No murmur heard. No friction rub. No gallop. Pulmonary:      Effort: Pulmonary effort is normal.      Breath sounds: Normal breath sounds. No wheezing. Abdominal:      General: Bowel sounds are normal. There is distension. Palpations: Abdomen is soft. Tenderness: There is no abdominal tenderness. There is no guarding or rebound. Comments: Ascites. Hyperactive BS   Musculoskeletal:         General: No swelling. Normal range of motion. Cervical back: Normal range of motion and neck supple. No rigidity or tenderness. Right lower leg: No edema. Left lower leg: No edema. Skin:     General: Skin is warm and dry. Findings: Bruising present. No erythema.    Neurological:      General: No focal deficit present. Mental Status: He is alert and oriented to person, place, and time. Mental status is at baseline. Sensory: No sensory deficit. Psychiatric:         Mood and Affect: Mood normal.         Behavior: Behavior normal.         Thought Content:  Thought content normal.         Judgment: Judgment normal.                                                                                       PROVISIONAL DIAGNOSES / SURGERY:      EGD BIOPSY     GI BLEED    Patient Active Problem List    Diagnosis Date Noted    GI bleed 09/13/2022    Secondary esophageal varices ([address] 06/07/2022    Esophageal polyp 06/07/2022    Drop in hemoglobin 06/03/2022    Portal hypertension ([address]     Shortness of breath     Ascites 04/26/2022    Acute kidney failure, unspecified ([address] 04/21/2022    Muscle weakness (generalized) 07/28/2016    Other abnormalities of gait and mobility 07/28/2016    Anemia 04/13/2022    Acute kidney injury ([address] 04/13/2022    Esophageal adenocarcinoma ([address]     Low hemoglobin 12/20/2021    Symptomatic anemia, microcytic, acute 12/20/2021    Hypotension 12/20/2021    Former smoker, 50+ pack years, quit 2016 12/20/2021    HLD (hyperlipidemia) 12/20/2021    Abnormal findings on diagnostic imaging of spine 12/14/2021    Cervical spinal stenosis 12/14/2021    Spinal stenosis of lumbar region with neurogenic claudication 12/14/2021    Severe comorbid illness 11/30/2021    Gait instability 11/30/2021    Current smoker 04/05/2021    COVID-19 02/23/2021    Anxiety 02/23/2021    Malignant neoplasm of lower third of esophagus ([address]     Hypocalcemia 12/26/2020    Hypophosphatemia 12/26/2020    Gastrointestinal hemorrhage with melena     Alcohol abuse     Altered mental status     Acute gastrointestinal bleeding 12/23/2020    Thrombocytopenia ([address] 12/23/2020    Hepatitis C virus infection resolved after antiviral drug therapy 12/23/2020    Cervical facet syndrome 11/23/2020    Lumbar facet arthropathy 08/17/2020 Elevated LFTs 08/12/2020    Seasonal allergies 08/12/2020    S/P epidural steroid injection 08/05/2020    Essential hypertension 04/24/2019    Recurrent major depressive disorder in partial remission ([address] 04/24/2019    Pure hypercholesterolemia 02/04/2019    Hypokalemia 02/04/2019    Vitamin D deficiency 09/20/2017    History of hepatitis C 09/11/2017    Ganglion cyst 05/31/2017    Carpal tunnel syndrome of right wrist 05/31/2017    Tinnitus 03/23/2017    Eustachian tube dysfunction 03/23/2017    Lumbar radiculitis 11/08/2016    Lumbar disc herniation 11/08/2016    Gynecomastia, male 10/26/2016    Depression 10/13/2016    Vertebrogenic low back pain 10/06/2016    DDD (degenerative disc disease), lumbar 10/06/2016    Hepatic cirrhosis ([address] 09/15/2016    Psychophysiologic insomnia 09/14/2016    Cirrhosis ([address]     Hep C w/o coma, chronic (HCC)     Fatty liver     Calculus of gallbladder without cholecystitis 08/10/2016    Chronic viral hepatitis B without delta agent and without coma ([address] 07/22/2016    Hypomagnesemia     Alcohol withdrawal syndrome without complication ([address] 90/33/6040    Cervical radicular pain 01/04/2016    Tubular adenoma of colon 01/01/2016    History of colon polyps 01/01/2016    Back pain, chronic 04/19/2012    Hearing difficulty 04/19/2012    GERD (gastroesophageal reflux disease) 04/19/2012               MASSIMO Gandara - CNP on 10/19/2022 at 8:28 AM

## 2022-10-19 NOTE — OP NOTE
ESOPHAGOGASTRODUODENOSCOPY   ( EGD )  DATE OF PROCEDURE: 10/19/2022     SURGEON: Johann Sanchez MD    ASSISTANT: None    PREOPERATIVE DIAGNOSIS: Patient is known to have early stage esophageal cancer for which he had chemoradiation therapy in the past.  Because of advancing liver disease, it was felt that he is not a candidate for surgery at [facility]. Known to have esophageal varices and angio ectasia in the fundus and at the GE junction. POSTOPERATIVE DIAGNOSIS: Small varices clinically insignificant. Patient has friable mucosa starting from the GE junction towards the fundus. Appears to have angio ectasia. Also patient has mild portal hypertensive gastropathy. OPERATION: Upper GI endoscopy with Biopsy    ANESTHESIA: MAC    ESTIMATED BLOOD LOSS: None    COMPLICATIONS: None. SPECIMENS:  Was Not Obtained    HISTORY: The patient is a 60y.o. year old male with history of above preop diagnosis. I recommended esophagogastroduodenoscopy with possible biopsy and I explained the risk, benefits, expected outcome, and alternatives to the procedure. Risks included but are not limited to bleeding, infection, respiratory distress, hypotension, and perforation of the esophagus, stomach, or duodenum. Patient understands and is in agreement. PROCEDURE: The patient was given IV conscious sedation. The patient's SPO2 remained above 90% throughout the procedure. Cetacaine spray given. Patient placed in left lateral position. Olympus  videogastroscope was inserted orally under vision into the esophagus without difficulty and advanced into the stomach then through the pylorus up to the second part of duodenum. Findings:    Retropharyngeal area was grossly normal appearing    Esophagus: abnormal: Gastroesophageal junction is at about 35 cm from incisor teeth. Patient does have small esophageal varices.   Appears to have ulcer at the GE junction from the previous variceal banding. Has 2 to 3 cm long sliding hiatal hernia. Has a friable mucosa starting from the GE junction towards the fundus secondary to angio ectasia, portal hypertensive gastropathy. No signs of acute bleeding. No recurrent malignancy or suspicious lesions seen at the area where he had small malignancy in the past.      Stomach:    Fundus and Cardia Examined in Retroflexed View: abnormal: Has a friable mucosa, annular ectasia, portal hypertensive gastropathy. Body: abnormal: Mild mucosal changes of portal hypertensive gastropathy. Antrum: normal    Duodenum:     Descending: normal    Bulb: normal    While withdrawing the scope the above findings were verified and the scope was removed. The patient has tolerated the procedure without unusual events. Recommendations/Plan:   F/U Biopsies  F/U In Office as instructed  Discussed with the family  To keep on beta-blocker therapy.                    Electronically signed by Mike Dixon MD  on 10/19/2022 at 11:03 AM

## 2022-10-21 ENCOUNTER — HOSPITAL ENCOUNTER (OUTPATIENT)
Dept: INTERVENTIONAL RADIOLOGY/VASCULAR | Age: 62
Discharge: HOME | End: 2022-10-23
Payer: MEDICARE

## 2022-10-21 ENCOUNTER — HOSPITAL ENCOUNTER (OUTPATIENT)
Age: 62
Setting detail: SPECIMEN
Discharge: HOME | End: 2022-10-21
Payer: MEDICARE

## 2022-10-21 VITALS
OXYGEN SATURATION: 100 % | DIASTOLIC BLOOD PRESSURE: 55 MMHG | HEIGHT: 70 IN | BODY MASS INDEX: 28.35 KG/M2 | TEMPERATURE: 97 F | WEIGHT: 198 LBS | HEART RATE: 80 BPM | RESPIRATION RATE: 20 BRPM | SYSTOLIC BLOOD PRESSURE: 97 MMHG

## 2022-10-21 DIAGNOSIS — F10.10 ALCOHOL ABUSE: ICD-10-CM

## 2022-10-21 DIAGNOSIS — K70.31 ALCOHOLIC CIRRHOSIS OF LIVER WITH ASCITES (HCC): ICD-10-CM

## 2022-10-21 LAB
ANION GAP SERPL CALCULATED.3IONS-SCNC: 11 MMOL/L (ref 9–17)
BUN BLDV-MCNC: 8 MG/DL (ref 8–23)
CALCIUM SERPL-MCNC: 8.3 MG/DL (ref 8.6–10.4)
CHLORIDE BLD-SCNC: 99 MMOL/L (ref 98–107)
CO2: 21 MMOL/L (ref 20–31)
CREAT SERPL-MCNC: 0.67 MG/DL (ref 0.7–1.2)
GFR SERPL CREATININE-BSD FRML MDRD: >60 ML/MIN/1.73M2
GLUCOSE BLD-MCNC: 116 MG/DL (ref 70–99)
HCT VFR BLD CALC: 20 % (ref 41–53)
HEMOGLOBIN: 6.4 G/DL (ref 13.5–17.5)
MCH RBC QN AUTO: 27.5 PG (ref 26–34)
MCHC RBC AUTO-ENTMCNC: 32 G/DL (ref 31–37)
MCV RBC AUTO: 85.9 FL (ref 80–100)
PDW BLD-RTO: 16.6 % (ref 11.5–14.9)
PLATELET # BLD: 170 K/UL (ref 150–450)
PMV BLD AUTO: 7.1 FL (ref 6–12)
POTASSIUM SERPL-SCNC: 4.1 MMOL/L (ref 3.7–5.3)
RBC # BLD: 2.33 M/UL (ref 4.5–5.9)
SODIUM BLD-SCNC: 131 MMOL/L (ref 135–144)
WBC # BLD: 5.1 K/UL (ref 3.5–11)

## 2022-10-21 PROCEDURE — 49083 ABD PARACENTESIS W/IMAGING: CPT

## 2022-10-21 PROCEDURE — 85027 COMPLETE CBC AUTOMATED: CPT

## 2022-10-21 PROCEDURE — 36415 COLL VENOUS BLD VENIPUNCTURE: CPT

## 2022-10-21 PROCEDURE — P9047 ALBUMIN (HUMAN), 25%, 50ML: HCPCS | Performed by: RADIOLOGY

## 2022-10-21 PROCEDURE — 80048 BASIC METABOLIC PNL TOTAL CA: CPT

## 2022-10-21 PROCEDURE — 6360000002 HC RX W HCPCS: Performed by: RADIOLOGY

## 2022-10-21 PROCEDURE — 7100000011 HC PHASE II RECOVERY - ADDTL 15 MIN

## 2022-10-21 PROCEDURE — 7100000010 HC PHASE II RECOVERY - FIRST 15 MIN

## 2022-10-21 PROCEDURE — 2709999900 IR US GUIDED PARACENTESIS

## 2022-10-21 RX ORDER — HEPARIN SODIUM (PORCINE) LOCK FLUSH IV SOLN 100 UNIT/ML 100 UNIT/ML
500 SOLUTION INTRAVENOUS
Status: COMPLETED | OUTPATIENT
Start: 2022-10-21 | End: 2022-10-21

## 2022-10-21 RX ORDER — ACETAMINOPHEN 325 MG/1
650 TABLET ORAL EVERY 4 HOURS PRN
Status: DISCONTINUED | OUTPATIENT
Start: 2022-10-21 | End: 2022-10-24 | Stop reason: HOSPADM

## 2022-10-21 RX ORDER — SODIUM CHLORIDE 0.9 % (FLUSH) 0.9 %
5-40 SYRINGE (ML) INJECTION PRN
Status: DISCONTINUED | OUTPATIENT
Start: 2022-10-21 | End: 2022-10-24 | Stop reason: HOSPADM

## 2022-10-21 RX ORDER — ALBUMIN (HUMAN) 12.5 G/50ML
75 SOLUTION INTRAVENOUS ONCE
Status: COMPLETED | OUTPATIENT
Start: 2022-10-21 | End: 2022-10-21

## 2022-10-21 RX ORDER — SODIUM CHLORIDE 0.9 % (FLUSH) 0.9 %
5-40 SYRINGE (ML) INJECTION EVERY 12 HOURS SCHEDULED
Status: DISCONTINUED | OUTPATIENT
Start: 2022-10-21 | End: 2022-10-24 | Stop reason: HOSPADM

## 2022-10-21 RX ORDER — SODIUM CHLORIDE 9 MG/ML
INJECTION, SOLUTION INTRAVENOUS CONTINUOUS
Status: DISCONTINUED | OUTPATIENT
Start: 2022-10-21 | End: 2022-10-24 | Stop reason: HOSPADM

## 2022-10-21 RX ORDER — SODIUM CHLORIDE 9 MG/ML
INJECTION, SOLUTION INTRAVENOUS PRN
Status: DISCONTINUED | OUTPATIENT
Start: 2022-10-21 | End: 2022-10-24 | Stop reason: HOSPADM

## 2022-10-21 RX ADMIN — ALBUMIN (HUMAN) 75 G: 0.25 INJECTION, SOLUTION INTRAVENOUS at 14:34

## 2022-10-21 RX ADMIN — HEPARIN 500 UNITS: 100 SYRINGE at 15:53

## 2022-10-21 ASSESSMENT — PAIN - FUNCTIONAL ASSESSMENT: PAIN_FUNCTIONAL_ASSESSMENT: NONE - DENIES PAIN

## 2022-10-21 NOTE — INTERVAL H&P NOTE
Update History & Physical     The patient's History and Physical of 10/19/22 was reviewed with the patient. I personally examined the patient, I concur with above findings, there was no change EXCEPT: pt reports 9.7 liters removed last procedure     Physical exam was completed today and unchanged from source note except    Heart rhythm and rate remains regular and normal, all lung fields CTA as noted per source H&P. Patient states they have been NPO since 0800 this morning  Medications taken TODAY (w/sip of water): All scheduled medications  Patient denies taking any anti-coagulants, including aspirin currently. No personal or family hx of complications w/anesthesia. Denies personal hx of MRSA. Denies personal hx of blood clots. Denies current CP, palpitations, dizziness, SOB, URI s/s, GI issues, fever/chills. Review current vital signs per RN flow sheet. (Notation: Medications listed are not currently reconciled at the signing of this H&P note, to be reconciled in pre-op per RN)    Most recent lab work reviewed:  Lab Results   Component Value Date     (L) 10/18/2022    K 4.9 10/18/2022    CL 94 (L) 10/18/2022    CO2 20 10/18/2022    BUN 11 10/18/2022    CREATININE 0.77 10/18/2022    GLUCOSE 107 (H) 10/18/2022    CALCIUM 8.8 10/18/2022    PROT 5.8 (L) 10/18/2022    LABALBU 3.4 (L) 10/18/2022    BILITOT 1.6 (H) 10/18/2022    ALKPHOS 151 (H) 10/18/2022    AST 40 (H) 10/18/2022    ALT 22 10/18/2022    LABGLOM >60 10/18/2022    GFRAA >60 09/28/2022    GLOB NOT REPORTED 08/19/2021       Lab Results   Component Value Date    WBC 8.6 10/18/2022    HGB 7.9 (L) 10/18/2022    HCT 25.0 (L) 10/18/2022    MCV 86.5 10/18/2022     10/18/2022       Surgical site was confirmed per myself and the patient.   Electronically signed by MASSIMO Peters CNP on 10/21/2022 at 12:04 PM

## 2022-10-21 NOTE — PROGRESS NOTES
Patient tolerated paracentesis without distress. 7200 ml of light yellow fluid removed. Dry dressing to site. Patient returned to room. Nurse updated.

## 2022-10-21 NOTE — DISCHARGE INSTRUCTIONS
DISCHARGE INSTRUCTIONS FOR PARACENTESIS    In order to continue your care at home, please follow the instructions below. Medications    Use over-the-counter pain medication as directed on bottle for pain control    Post Procedure Site/Care/Activity  For up to 2 days after the procedure, you may have a small amount of clear fluid coming out of the site where the needle was inserted, especially if you had a lot of fluid removed. You may need to change the bandage on the site. Do not lie totally flat until morning. You can do your normal activities after the procedure, unless instructed differently. Call your doctor or seek immediate medical care if:   You have symptoms of infection, such as increased pain, swelling, warmth, or redness, red streaks or pus. An oral temperature (by mouth) is 101 degrees or higher (fever), chills, fever. You are dizzy or lightheaded, or you feel like you may faint. You have new or worse belly pain. Watch closely for changes in your health, and be sure to contact your doctor if:   Fluid builds up in your belly again. You do not get better as expected. IF YOU CANNOT REACH THE DOCTOR, GO TO THE NEAREST EMERGENCY ROOM OR CALL 911    Phone: Interventional Radiology   [phone]  After hours Radiology   [phone]     Dr Renny Gonzalez performed paracentesis removing 7.9 liters of fluid. Given 3 bottles of albumin.   Wt after removal 185lb

## 2022-10-27 ENCOUNTER — OFFICE VISIT (OUTPATIENT)
Dept: GASTROENTEROLOGY | Age: 62
End: 2022-10-27
Payer: MEDICARE

## 2022-10-27 VITALS
WEIGHT: 202 LBS | DIASTOLIC BLOOD PRESSURE: 62 MMHG | HEART RATE: 79 BPM | SYSTOLIC BLOOD PRESSURE: 102 MMHG | BODY MASS INDEX: 28.98 KG/M2 | TEMPERATURE: 97.3 F

## 2022-10-27 DIAGNOSIS — K76.6 PORTAL HYPERTENSIVE GASTROPATHY (HCC): ICD-10-CM

## 2022-10-27 DIAGNOSIS — D64.9 ANEMIA, UNSPECIFIED TYPE: Primary | ICD-10-CM

## 2022-10-27 DIAGNOSIS — E87.1 HYPONATREMIA: ICD-10-CM

## 2022-10-27 DIAGNOSIS — B18.2 HEP C W/O COMA, CHRONIC (HCC): ICD-10-CM

## 2022-10-27 DIAGNOSIS — K70.31 ASCITES DUE TO ALCOHOLIC CIRRHOSIS (HCC): ICD-10-CM

## 2022-10-27 DIAGNOSIS — K22.711 BARRETT'S ESOPHAGUS WITH HIGH GRADE DYSPLASIA: ICD-10-CM

## 2022-10-27 DIAGNOSIS — K31.89 PORTAL HYPERTENSIVE GASTROPATHY (HCC): ICD-10-CM

## 2022-10-27 PROCEDURE — 3074F SYST BP LT 130 MM HG: CPT | Performed by: NURSE PRACTITIONER

## 2022-10-27 PROCEDURE — 99214 OFFICE O/P EST MOD 30 MIN: CPT | Performed by: NURSE PRACTITIONER

## 2022-10-27 PROCEDURE — 3078F DIAST BP <80 MM HG: CPT | Performed by: NURSE PRACTITIONER

## 2022-10-27 ASSESSMENT — ENCOUNTER SYMPTOMS
SHORTNESS OF BREATH: 0
COUGH: 0
BACK PAIN: 1
TROUBLE SWALLOWING: 0
CHOKING: 0
VOMITING: 0
ANAL BLEEDING: 0
DIARRHEA: 0
RECTAL PAIN: 0
ABDOMINAL PAIN: 1
ABDOMINAL DISTENTION: 1
NAUSEA: 1
COLOR CHANGE: 1
WHEEZING: 0
CONSTIPATION: 0
BLOOD IN STOOL: 1

## 2022-10-27 NOTE — PROGRESS NOTES
GI CLINIC FOLLOW UP    INTERVAL HISTORY:   No referring provider defined for this encounter. Chief Complaint   Patient presents with    Cirrhosis     Patient is f/u on EGD/colon. He also has cirrhosis. He has paracentesis schedled for tomorrow. Patient is currently taking Lasix. He states he is not taking Aldactone. Rectal Bleeding     Patient c/o black tar stools           HISTORY OF PRESENT ILLNESS:     Patient being seen for follow-up. Patient had repeat EGD. Small varices seen. Friable mucosa starting at GE junction towards fundus 2/2 PHG, also noted to have angiectasias. Noted to have ulcer at GE junction from previous variceal banding. 2-3 cm HH  No acute bleeding. No recurrence of malignancy noted. Patient had hgb 6.4 on Friday. He states he received 1 unit of PRBCs Friday. Approximately 1 month ago his hgb was 10. Reports yesterday he had small dark stool. Denies any diarrhea. No increased dizziness, shortness of breath, etc.    Patient currently on Nadolol. Takes Midodrine daily    Currently on Lasix  Does not appear to be on aldactone  , K 4.1  BUN 8, Creatinine 0.67      Past Medical,Family, and Social History reviewed and does contribute to the patient presentingcondition. Patient's PMH/PSH,SH,PSYCH Hx, MEDs, ALLERGIES, and ROS were all reviewed and updated in the appropriate sections.     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 Mary Kate Reeves, orthopedic, every 3-4 months, gets steroid injection    Lezama esophagus     BPH (benign prostatic hypertrophy)     Cholelithiasis     Cirrhosis ([address]     COVID-19 12/2020    pt reports he had a positive test while at [facility] in 2020, was asymptomatic    COVID-19 vaccine series completed 5/20/2021, 6/22/2021    Moderna 5/20/2021, 6/22/2021    DDD (degenerative disc disease), lumbar     Depression     Esophageal cancer ([facility]     INVASIVE ADENOCARCINOMA ARISING IN TUBULAR ADENOMA WITH HIGH GRADE DYSPLASIA, ASSOCIATED WITH FOCAL INTESTINAL METAPLASIA     Esophageal varices (HCC)     Fatty liver     GERD (gastroesophageal reflux disease)     Hep C w/o coma, chronic ([address]     History of alcohol abuse     6-12 beers a day; quit drinking 2019    History of blood transfusion     History of colon polyps 2016    History of tobacco abuse     [place] (hard of hearing)     Hyperlipidemia     Hypertension     Hyponatremia 07/20/2016    Port-A-Cath in place     right upper chest    Portal hypertension ([facility]     Sciatica     Secondary esophageal varices ([facility] 06/07/2022    Shortness of breath     Spinal stenosis     Stomach ulcer     hx of    Tubular adenoma of colon 2016, 2018    Vitamin D deficiency     Wears glasses        Past Surgical History:   Procedure Laterality Date    BUNIONECTOMY      twice on right side    BUNIONECTOMY Left     CARPAL TUNNEL RELEASE Right     COLONOSCOPY      at age 35    COLONOSCOPY  10/05/2016    polyps-pathology tubular adenoma, and abnormal looking mucosa right colon-pathology-tubular adenoma    COLONOSCOPY N/A 03/30/2018    COLONOSCOPY POLYPECTOMY COLD BIOPSY performed by Deborah Ingram MD at [address]  03/30/2018    Small polyp in the sigmoid colon and excised with biopsy forceps--tubular adenoma    COLONOSCOPY N/A 04/16/2022    COLONOSCOPY POLYPECTOMY performed by Deborah Ingram MD at [facility], COLON, DIAGNOSTIC      EGD    IR PORT PLACEMENT EQUAL OR GREATER THAN 5 YEARS  04/19/2021    IR PORT PLACEMENT EQUAL OR GREATER THAN 5 YEARS 4/19/2021 [facility] SPECIAL PROCEDURES    KNEE SURGERY Left     cyst removed    NASAL SEPTUM SURGERY      NERVE BLOCK Right 11/23/2020    NERVE BLOCK RIGHT CERVICAL STEROID INJECTION  C3-C6 performed by Juan Alberto Cleveland MD at [address] [place] [address] 01/04/2016    steroid injection C7 T1    OTHER SURGICAL HISTORY  11/21/2016    Bilateral Lumbar CACHORRO L4-L5 injections    OTHER SURGICAL HISTORY  12/19/2016    lumbar steroid injection    OTHER SURGICAL HISTORY  09/28/2018    BILATERAL L5 CACHORRO (N/A Back)    OTHER SURGICAL HISTORY Right 11/23/2020    cervical injection    PAIN MANAGEMENT PROCEDURE Left 07/09/2020    EPIDURAL STEROID INJECTION LEFT L4 L5 performed by Ermelinda Joseph MD at [address] Left 07/20/2020    LEFT L4 L5 EPIDURAL STEROID INJECTION performed by Ermelinda Joseph MD at [address] Bilateral 08/17/2020    LUMBAR FACET BILATERAL L2-L5 performed by Ermelinda Joseph MD at [address] Bilateral 12/07/2020    NERVE BLOCK BILATERAL LUMBAR MEDIAL BRANCH L2-L5 performed by Ermelinda Joseph MD at [address] [address] AA&/STRD TFRML EPI LUMBAR/SACRAL 1 LEVEL Bilateral 09/06/2018    BILATERAL L5 CACHORRO performed by Ermelinda Joseph MD at [facility] AA&/STRD TFRML EPI LUMBAR/SACRAL 1 LEVEL N/A 09/28/2018    BILATERAL L5 CACHORRO performed by Ermelinda Joseph MD at [address] [place] [address] N/CARPAL TUNNEL SURG Right 08/29/2017    CARPAL TUNNEL RELEASE RIGHT performed by Reinier Howe MD at [address] [place] [address] N/CARPAL TUNNEL SURG Left 10/31/2017    CARPAL TUNNEL RELEASE performed by Reinier Howe MD at [address] [place] [address] 12/29/2020    EGD BIOPSY performed by Felix Parikh MD at [address] [place] [address] 02/02/2021    EGD BIOPSY and spot marking performed by Hemant Capellan MD at [address] [place] [address] 02/12/2021    ENDOSCOPIC ULTRASOUND, EGD performed by Rk Dimas MD at [facility] 1  02/12/2021    EGD DIAGNOSTIC ONLY performed by Rk Dimas MD at [facility] 1 08/31/2021    EGD BIOPSY tablet, Rfl: 3    ALLERGIES:   No Known Allergies    FAMILY HISTORY:       Problem Relation Age of Onset    Cancer Mother         pancreatic    Cancer Father         bone    Diabetes Sister     Asthma Brother          SOCIAL HISTORY:   Social History     Socioeconomic History    Marital status: Single     Spouse name: Not on file    Number of children: Not on file    Years of education: Not on file    Highest education level: Not on file   Occupational History    Not on file   Tobacco Use    Smoking status: Former     Packs/day: 1.00     Years: 45.00     Pack years: 45.00     Types: Cigarettes     Quit date: 2017     Years since quittin.7    Smokeless tobacco: Never   Vaping Use    Vaping Use: Never used   Substance and Sexual Activity    Alcohol use: Not Currently     Comment: Quit alcohol in 2019- heavier drinking prior to quitting    Drug use: Not Currently     Frequency: 1.0 times per week     Types: Cocaine     Comment: Cocaine- stopped spring 2016    Sexual activity: Yes     Partners: Female   Other Topics Concern    Not on file   Social History Narrative     in the past, retired     Social Determinants of Health     Financial Resource Strain: Low Risk     Difficulty of Paying Living Expenses: Not hard at all   Food Insecurity: No Food Insecurity    Worried About [address] in the Last Year: Never true    [facility] [place] in the Last Year: Never true   Transportation Needs: Not on file   Physical Activity: Inactive    Days of Exercise per Week: 0 days    Minutes of Exercise per Session: 0 min   Stress: Not on file   Social Connections: Not on file   Intimate Partner Violence: Not on file   Housing Stability: Not on file       REVIEW OF SYSTEMS: A 12-point review of systemswas obtained and pertinent positives and negatives were enumerated above in the history of present illness. All other reviewed systems / symptoms were negative.     Review of Systems   Constitutional:  Negative for appetite change, fatigue and unexpected weight change. HENT:  Negative for trouble swallowing. Eyes:  Positive for visual disturbance (glasses ). Respiratory:  Negative for cough, choking, shortness of breath and wheezing. Cardiovascular:  Negative for chest pain, palpitations and leg swelling. Gastrointestinal:  Positive for abdominal distention, abdominal pain, blood in stool and nausea. Negative for anal bleeding, constipation, diarrhea, rectal pain and vomiting. Genitourinary:  Negative for difficulty urinating. Musculoskeletal:  Positive for back pain. Skin:  Positive for color change. Allergic/Immunologic: Negative for environmental allergies and food allergies. Neurological:  Negative for dizziness, weakness, light-headedness, numbness and headaches. Hematological:  Bruises/bleeds easily. Psychiatric/Behavioral:  Negative for sleep disturbance. The patient is not nervous/anxious. PHYSICAL EXAMINATION: Vital signs reviewed per the nursing documentation. /62   Pulse 79   Temp 97.3 °F (36.3 °C)   Wt 202 lb (91.6 kg)   BMI 28.98 kg/m²   Body mass index is 28.98 kg/m². Physical Exam  Constitutional:       Appearance: Normal appearance. Eyes:      General: No scleral icterus. Pupils: Pupils are equal, round, and reactive to light. Cardiovascular:      Rate and Rhythm: Normal rate and regular rhythm. Heart sounds: Normal heart sounds. Pulmonary:      Effort: Pulmonary effort is normal.      Breath sounds: Normal breath sounds. Abdominal:      General: Bowel sounds are normal. There is distension. Palpations: Abdomen is soft. There is no mass. Tenderness: There is no abdominal tenderness. There is no guarding. Musculoskeletal:      Right lower leg: No edema. Left lower leg: No edema. Skin:     General: Skin is warm and dry. Neurological:      Mental Status: He is alert and oriented to person, place, and time.  Mental status is at baseline. LABORATORY DATA: Reviewed  Lab Results   Component Value Date    WBC 5.1 10/21/2022    HGB 6.4 (LL) 10/21/2022    HCT 20.0 (L) 10/21/2022    MCV 85.9 10/21/2022     10/21/2022     (L) 10/21/2022    K 4.1 10/21/2022    CL 99 10/21/2022    CO2 21 10/21/2022    BUN 8 10/21/2022    CREATININE 0.67 (L) 10/21/2022    LABPROT 7.7 04/19/2012    LABALBU 3.4 (L) 10/18/2022    BILITOT 1.6 (H) 10/18/2022    ALKPHOS 151 (H) 10/18/2022    AST 40 (H) 10/18/2022    ALT 22 10/18/2022    INR 1.2 10/14/2022         Lab Results   Component Value Date    RBC 2.33 (L) 10/21/2022    HGB 6.4 (LL) 10/21/2022    MCV 85.9 10/21/2022    MCH 27.5 10/21/2022    MCHC 32.0 10/21/2022    RDW 16.6 (H) 10/21/2022    MPV 7.1 10/21/2022    BASOPCT 0 10/18/2022    LYMPHSABS 0.50 (L) 10/18/2022    MONOSABS 1.00 10/18/2022    NEUTROABS 7.10 10/18/2022    EOSABS 0.00 10/18/2022    BASOSABS 0.00 10/18/2022         DIAGNOSTIC TESTING:     IR US GUIDED PARACENTESIS    Result Date: 10/21/2022  PROCEDURE: PARACENTESIS WITH IMAGE GUIDANCE US ABDOMEN LIMITED 10/21/2022 HISTORY: ORDERING SYSTEM PROVIDED HISTORY: Alcoholic cirrhosis of liver with ascites ([facility] TECHNOLOGIST PROVIDED HISTORY: Weekly due o amount drained every 2 weeks TECHNIQUE: Informed consent was obtained after a detailed explanation of the procedure including risks, benefits, and alternatives. Universal protocol was followed. A limited ultrasound of the abdomen was performed and shows large amount of ascites. The left abdomen was prepped and draped in sterile fashion and local anesthesia was achieved with lidocaine. A 5 Kyrgyz needle sheath was advanced into ascites using realtime ultrasound guidance and paracentesis was performed. The patient tolerated the procedure well. EBL: None FINDINGS: Limited ultrasound of the abdomen demonstrates ascites. A total of 7.9 L of clear yellow fluid was removed. Supplemental albumin was given intravenously.      Successful ultrasound-guided paracentesis. IR US GUIDED PARACENTESIS    Result Date: 10/14/2022  PROCEDURE: PARACENTESIS WITH IMAGE GUIDANCE US ABDOMEN LIMITED 10/14/2022 HISTORY: ORDERING SYSTEM PROVIDED HISTORY: Alcoholic cirrhosis of liver with ascites ([facility] TECHNOLOGIST PROVIDED HISTORY: Weekly due o amount drained every 2 weeks TECHNIQUE: Informed consent was obtained after a detailed explanation of the procedure including risks, benefits, and alternatives. Universal protocol was followed. A limited ultrasound of the abdomen was performed. The right abdomen was prepped and draped in sterile fashion and local anesthesia was achieved with lidocaine. An 5 Andorran needle sheath was advanced into ascites and paracentesis was performed. The patient tolerated the procedure well. FINDINGS: Limited ultrasound of the abdomen demonstrates ascites. A total of 9.7 L was removed. Successful ultrasound-guided therapeutic paracentesis. IR US GUIDED PARACENTESIS    Result Date: 10/7/2022  PROCEDURE: PARACENTESIS WITHOUT IMAGE GUIDANCE US ABDOMEN LIMITED 10/7/2022 HISTORY: ORDERING SYSTEM PROVIDED HISTORY: Alcoholic cirrhosis of liver with ascites ([facility] TECHNOLOGIST PROVIDED HISTORY: Weekly due o amount drained every 2 weeks TECHNIQUE: Informed consent was obtained after a explanation of the procedure including risks. Universal protocol was followed. A limited ultrasound of the abdomen was performed. The right abdomen was prepped and draped in sterile fashion, and local anesthesia was achieved with 1% lidocaine. A 5 Andorran needle sheath was advanced into the ascites under direct ultrasound guidance (a sterile probe cover and gel were used), and paracentesis was performed. A total of 10.6 L of yellow fluid was aspirated. IV albumin therapy was initiated. The patient tolerated the procedure well without immediately apparent complication.  FINDINGS: Initial limited abdominal ultrasound demonstrated a large amount of ascites. Successful paracentesis     IR US GUIDED PARACENTESIS    Result Date: 9/30/2022  EXAMINATION: IR US GUIDED PARACENTESIS W IMAGING HISTORY / PRE-OPERATIVE DIAGNOSIS: ORDERING SYSTEM PROVIDED HISTORY: Alcoholic cirrhosis of liver with ascites ([facility] TECHNOLOGIST PROVIDED HISTORY: Weekly due o amount drained every 2 weeks : Barbara Torres ANESTHESIA: Local TECHNIQUE: After obtaining informed consent, the patient was placed in the supine position in bed. The right lateral flank area was cleansed and draped in the usual sterile fashion. A time-out was performed prior to commencing the procedure. 1% Lidocaine was used for local anesthesia. Next, a 5 Yazmin FlexGen needle catheter  was introduced into the ascites fluid collection. As much fluid was removed as possible by vacuum container bottles. A total of 10.7 L of cloudy light stephanie colored fluid was removed. A total of 1300 cc of the cloudy light stephanie colored fluid was sent to the lab for evaluation. 75 g IV albumin was given. COMPARISON: Paracentesis dated 09/23/2022 FINDINGS: Technically successful ultrasound guided large volume paracentesis ESTIMATED BLOOD LOSS: Less than 50 SPECIMENS: A total of 10.7 L of cloudy light stephanie colored fluid was obtained. Technically successful US guided large volume paracentesis with removal of 10.7 L of fluid as described above. The patient received 75 grams of Albumin IV during and post procedure. COMPLICATIONS: The patient tolerated the procedure well without any immediate complications. IMPRESSION: Mr. Lupe Redman is a 60 y.o. male with    Diagnosis Orders   1. Anemia, unspecified type  CBC with Auto Differential    Comprehensive Metabolic Panel      2. Lezama's esophagus with high grade dysplasia  CBC with Auto Differential    Comprehensive Metabolic Panel      3. Ascites due to alcoholic cirrhosis (HCC)  CBC with Auto Differential    Comprehensive Metabolic Panel      4.  Hep C w/o coma, chronic ([facility]  CBC with Auto Differential    Comprehensive Metabolic Panel      5. Portal hypertensive gastropathy (HCC)  CBC with Auto Differential    Comprehensive Metabolic Panel      6. Hyponatremia  CBC with Auto Differential    Comprehensive Metabolic Panel        Patient had acute drop in hgb over the last 1 month. Yesterday he reports small dark stool  No diarrhea, no recurrent dark stools  Patient did have PRBC transfusion on Friday for hgb of 6.4  Has recurrent ascites  No BLE edema, no encephalopathy    Patient is advised CBC tomorrow and to call the office. Will also check CMP. He is scheduled for paracentesis tomorrow at 1200    If he has any further signs of bleeding or has increased dizziness, shortness of breath, chest pain, etc to go to ED immediately. Patient verbalized understanding. Ex-wife also present and verbalized understanding. Thank you for allowing me to participate in the care of Mr. Rosa Weaver. For any further questions please do not hesitate to contact me. I have reviewed and agree with the ROS entered by the MA/LPN.          ANNA Fong    [facility]  Office #: [phone]

## 2022-10-28 ENCOUNTER — HOSPITAL ENCOUNTER (OUTPATIENT)
Age: 62
Setting detail: SPECIMEN
Discharge: HOME | DRG: 432 | End: 2022-10-28
Payer: MEDICARE

## 2022-10-28 ENCOUNTER — HOSPITAL ENCOUNTER (OUTPATIENT)
Dept: INTERVENTIONAL RADIOLOGY/VASCULAR | Age: 62
Discharge: HOME | DRG: 432 | End: 2022-10-30
Payer: MEDICARE

## 2022-10-28 VITALS
TEMPERATURE: 97.8 F | RESPIRATION RATE: 18 BRPM | DIASTOLIC BLOOD PRESSURE: 56 MMHG | OXYGEN SATURATION: 99 % | SYSTOLIC BLOOD PRESSURE: 101 MMHG | HEART RATE: 76 BPM

## 2022-10-28 DIAGNOSIS — B18.2 HEP C W/O COMA, CHRONIC (HCC): ICD-10-CM

## 2022-10-28 DIAGNOSIS — K70.31 ALCOHOLIC CIRRHOSIS OF LIVER WITH ASCITES (HCC): ICD-10-CM

## 2022-10-28 DIAGNOSIS — K31.89 PORTAL HYPERTENSIVE GASTROPATHY (HCC): ICD-10-CM

## 2022-10-28 DIAGNOSIS — K76.6 PORTAL HYPERTENSIVE GASTROPATHY (HCC): ICD-10-CM

## 2022-10-28 DIAGNOSIS — E87.1 HYPONATREMIA: ICD-10-CM

## 2022-10-28 DIAGNOSIS — D64.9 ANEMIA, UNSPECIFIED TYPE: ICD-10-CM

## 2022-10-28 DIAGNOSIS — K70.31 ASCITES DUE TO ALCOHOLIC CIRRHOSIS (HCC): ICD-10-CM

## 2022-10-28 DIAGNOSIS — K22.711 BARRETT'S ESOPHAGUS WITH HIGH GRADE DYSPLASIA: ICD-10-CM

## 2022-10-28 LAB
ABSOLUTE EOS #: 0 K/UL (ref 0–0.4)
ABSOLUTE LYMPH #: 0.4 K/UL (ref 1–4.8)
ABSOLUTE MONO #: 0.25 K/UL (ref 0.1–1.3)
ALBUMIN SERPL-MCNC: 3.6 G/DL (ref 3.5–5.2)
ALP BLD-CCNC: 87 U/L (ref 40–129)
ALT SERPL-CCNC: 10 U/L (ref 5–41)
ANION GAP SERPL CALCULATED.3IONS-SCNC: 12 MMOL/L (ref 9–17)
AST SERPL-CCNC: 23 U/L
BASOPHILS # BLD: 0 % (ref 0–2)
BASOPHILS ABSOLUTE: 0 K/UL (ref 0–0.2)
BILIRUB SERPL-MCNC: 1 MG/DL (ref 0.3–1.2)
BUN BLDV-MCNC: 15 MG/DL (ref 8–23)
CALCIUM SERPL-MCNC: 8.1 MG/DL (ref 8.6–10.4)
CHLORIDE BLD-SCNC: 93 MMOL/L (ref 98–107)
CO2: 19 MMOL/L (ref 20–31)
CREAT SERPL-MCNC: 0.9 MG/DL (ref 0.7–1.2)
EOSINOPHILS RELATIVE PERCENT: 0 % (ref 0–4)
GFR SERPL CREATININE-BSD FRML MDRD: >60 ML/MIN/1.73M2
GLUCOSE BLD-MCNC: 128 MG/DL (ref 70–99)
HCT VFR BLD CALC: 16.8 % (ref 41–53)
HEMOGLOBIN: 5.1 G/DL (ref 13.5–17.5)
LYMPHOCYTES # BLD: 11 % (ref 24–44)
MCH RBC QN AUTO: 25.1 PG (ref 26–34)
MCHC RBC AUTO-ENTMCNC: 30.1 G/DL (ref 31–37)
MCV RBC AUTO: 83.5 FL (ref 80–100)
MONOCYTES # BLD: 7 % (ref 1–7)
MORPHOLOGY: ABNORMAL
PDW BLD-RTO: 16.5 % (ref 11.5–14.9)
PLATELET # BLD: 142 K/UL (ref 150–450)
PMV BLD AUTO: 6.8 FL (ref 6–12)
POTASSIUM SERPL-SCNC: 3.8 MMOL/L (ref 3.7–5.3)
RBC # BLD: 2.01 M/UL (ref 4.5–5.9)
SEG NEUTROPHILS: 82 % (ref 36–66)
SEGMENTED NEUTROPHILS ABSOLUTE COUNT: 2.95 K/UL (ref 1.3–9.1)
SODIUM BLD-SCNC: 124 MMOL/L (ref 135–144)
TOTAL PROTEIN: 5.3 G/DL (ref 6.4–8.3)
WBC # BLD: 3.6 K/UL (ref 3.5–11)

## 2022-10-28 PROCEDURE — 2709999900 IR US GUIDED PARACENTESIS

## 2022-10-28 PROCEDURE — 80053 COMPREHEN METABOLIC PANEL: CPT

## 2022-10-28 PROCEDURE — 6360000002 HC RX W HCPCS: Performed by: RADIOLOGY

## 2022-10-28 PROCEDURE — 0W9G3ZZ DRAINAGE OF PERITONEAL CAVITY, PERCUTANEOUS APPROACH: ICD-10-PCS | Performed by: RADIOLOGY

## 2022-10-28 PROCEDURE — P9047 ALBUMIN (HUMAN), 25%, 50ML: HCPCS | Performed by: RADIOLOGY

## 2022-10-28 PROCEDURE — 49083 ABD PARACENTESIS W/IMAGING: CPT

## 2022-10-28 PROCEDURE — 36415 COLL VENOUS BLD VENIPUNCTURE: CPT

## 2022-10-28 PROCEDURE — 85025 COMPLETE CBC W/AUTO DIFF WBC: CPT

## 2022-10-28 PROCEDURE — 7100000010 HC PHASE II RECOVERY - FIRST 15 MIN

## 2022-10-28 PROCEDURE — 7100000011 HC PHASE II RECOVERY - ADDTL 15 MIN

## 2022-10-28 RX ORDER — SODIUM CHLORIDE 9 MG/ML
INJECTION, SOLUTION INTRAVENOUS PRN
Status: DISCONTINUED | OUTPATIENT
Start: 2022-10-28 | End: 2022-10-31 | Stop reason: HOSPADM

## 2022-10-28 RX ORDER — SODIUM CHLORIDE 0.9 % (FLUSH) 0.9 %
5-40 SYRINGE (ML) INJECTION EVERY 12 HOURS SCHEDULED
Status: DISCONTINUED | OUTPATIENT
Start: 2022-10-28 | End: 2022-10-31 | Stop reason: HOSPADM

## 2022-10-28 RX ORDER — SODIUM CHLORIDE 0.9 % (FLUSH) 0.9 %
5-40 SYRINGE (ML) INJECTION PRN
Status: DISCONTINUED | OUTPATIENT
Start: 2022-10-28 | End: 2022-10-31 | Stop reason: HOSPADM

## 2022-10-28 RX ORDER — SODIUM CHLORIDE 9 MG/ML
INJECTION, SOLUTION INTRAVENOUS CONTINUOUS
Status: DISCONTINUED | OUTPATIENT
Start: 2022-10-28 | End: 2022-10-31 | Stop reason: HOSPADM

## 2022-10-28 RX ORDER — ALBUMIN (HUMAN) 12.5 G/50ML
75 SOLUTION INTRAVENOUS ONCE
Status: COMPLETED | OUTPATIENT
Start: 2022-10-28 | End: 2022-10-28

## 2022-10-28 RX ORDER — ACETAMINOPHEN 325 MG/1
650 TABLET ORAL EVERY 4 HOURS PRN
Status: DISCONTINUED | OUTPATIENT
Start: 2022-10-28 | End: 2022-10-31 | Stop reason: HOSPADM

## 2022-10-28 RX ADMIN — ALBUMIN (HUMAN) 75 G: 0.25 INJECTION, SOLUTION INTRAVENOUS at 13:30

## 2022-10-28 ASSESSMENT — ENCOUNTER SYMPTOMS
ABDOMINAL DISTENTION: 1
SHORTNESS OF BREATH: 1

## 2022-10-28 NOTE — OP NOTE
Brief Postoperative Note    Sandeep Zamora  YOB: 1960  [id number]    Pre-operative Diagnosis: ascites    Post-operative Diagnosis: Same    Procedure: paracentesis    Anesthesia: Local   Surgeons/Assistants: Maribell Gayle MD     Estimated Blood Loss: minimal    Complications: none immediate    Specimens: were obtained      Electronically signed by Maribell Gayle MD on 10/28/2022 at 1:59 PM

## 2022-10-28 NOTE — PROGRESS NOTES
Ultrasound in use. Right side of abd prepped and draped. Numbed and accessed per Dr. Janell Woods. 8.1 Liters cloudy yellow fluid removed. Access removed and dressing placed. Albumin initiated as ordered.

## 2022-10-28 NOTE — DISCHARGE INSTRUCTIONS
DISCHARGE INSTRUCTIONS FOR PARACENTESIS    In order to continue your care at home, please follow the instructions below. Medications    Use over-the-counter pain medication as directed on bottle for pain control    Post Procedure Site/Care/Activity  For up to 2 days after the procedure, you may have a small amount of clear fluid coming out of the site where the needle was inserted, especially if you had a lot of fluid removed. You may need to change the bandage on the site. Do not lie totally flat until morning. You can do your normal activities after the procedure, unless instructed differently. Call your doctor or seek immediate medical care if:   You have symptoms of infection, such as increased pain, swelling, warmth, or redness, red streaks or pus. An oral temperature (by mouth) is 101 degrees or higher (fever), chills, fever. You are dizzy or lightheaded, or you feel like you may faint. You have new or worse belly pain. Watch closely for changes in your health, and be sure to contact your doctor if:   Fluid builds up in your belly again. You do not get better as expected.       IF YOU CANNOT REACH THE DOCTOR, GO TO THE NEAREST EMERGENCY ROOM OR CALL 911    Phone: Interventional Radiology   [phone] ( Dr Darrin Blanton )  After hours Radiology   [phone]     8.1 Liters removed

## 2022-10-28 NOTE — H&P
HISTORY and [facility] [id number]       NAME:  Mary Colon  MRN: [id number]   YOB: 1960   Date: 10/28/2022   Age: 60 y.o. Gender: male       COMPLAINT AND PRESENT HISTORY:     Mary Colon is 60 y.o.,  male, presents for paracentesis treatment for alcoholic cirrhosis     Primary dx:   HPI:  Mary Colon is 60 y.o.,  male, here today for paracentesis. Patient is receiving treatments paracentesis treatment for alcoholic cirrhosis  Patient has history of liver cirrhosis with ascites. Patient last had last paracentesis on 10/21/22 with  A total of 7.9 L of clear yellow fluid was removed. Patient admits to diffuse abdominal pain described as a pressure (8-9/10), with radiation ot his chest   Patient admits to associated shortness of breath, improved by sitting in chair, worse when lying flat. Patient is  easy bleeding/bruising. Patient  has no swelling in the lower legs bilaterally. No other concerns today, no recent fever/chills, no chest pain, no shortness of breath currently. Review of additional SIGNIFICANT medical hx (see chart for additional detail, including current medications / see ROS for current s/s):     ESOPHAGEAL CA, HEP. C, HX OF ETOH ABUSE,  PORTAL HTN, ESOPHAGEAL VARICES, CIRRHOSIS, HTN, HLD, JIMENES ESOPHAGUS, COVID-19, SOB. EKG 9/12/22:  Normal sinus rhythm  Low voltage QRS  Nonspecific ST and T wave abnormality  Abnormal ECG  When compared with ECG of 05-JUL-2022 14:27,  Previous ECG has undetermined rhythm, needs review     ECHO 12/20/21:  Summary  Left ventricle is normal in size. Mild left ventricular hypertrophy. Global left ventricular systolic function is normal. Estimated LV EF 60-65  %. Left atrium is mildly dilated. No significant valvular regurgitation or stenosis seen. No significant pericardial effusion is seen. Normal aortic root dimension.      NPO status: pt NPO since the past midnight   Medications taken TODAY (with sip of water): pt took all his am medication with sip of water   Anticoagulation status: none  Denies personal hx of blood clots. Denies personal hx of MRSA infection. Denies any personal or family hx of previous complications w/anesthesia.     PAST MEDICAL HISTORY     Past Medical History:   Diagnosis Date    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 ([address]     Alcoholic cirrhosis of liver with ascites ([address]     Anemia 04/13/2022    Anxiety     Arthritis     Back pain, chronic     dr. Hari Downs, orthopedic, every 3-4 months, gets steroid injection    Lezama esophagus     BPH (benign prostatic hypertrophy)     Cholelithiasis     Cirrhosis ([address]     COVID-19 12/2020    pt reports he had a positive test while at [facility] in 2020, was asymptomatic    COVID-19 vaccine series completed 5/20/2021, 6/22/2021    Moderna 5/20/2021, 6/22/2021    DDD (degenerative disc disease), lumbar     Depression     Esophageal cancer ([address]     INVASIVE ADENOCARCINOMA ARISING IN TUBULAR ADENOMA WITH HIGH GRADE DYSPLASIA, ASSOCIATED WITH FOCAL INTESTINAL METAPLASIA     Esophageal varices ([address]     Fatty liver     GERD (gastroesophageal reflux disease)     Hep C w/o coma, chronic ([address]     History of alcohol abuse     6-12 beers a day; quit drinking 2019    History of blood transfusion     History of colon polyps 2016    History of tobacco abuse     False Pass (hard of hearing)     Hyperlipidemia     Hypertension     Hyponatremia 07/20/2016    Port-A-Cath in place     right upper chest    Portal hypertension ([address]     Sciatica     Secondary esophageal varices ([address] 06/07/2022    Shortness of breath     Spinal stenosis     Stomach ulcer     hx of    Tubular adenoma of colon 2016, 2018    Vitamin D deficiency     Wears glasses        SURGICAL HISTORY       Past Surgical History:   Procedure Laterality Date    BUNIONECTOMY      twice on right side    BUNIONECTOMY Left     CARPAL TUNNEL RELEASE Right     COLONOSCOPY      at age 35    COLONOSCOPY performed by Reji Ba MD at [address] Right 08/29/2017    CARPAL TUNNEL [facility] 10/31/2017    CARPAL TUNNEL RELEASE performed by Jamin Navarro MD at [address] [place] [address] 12/29/2020    EGD BIOPSY performed by Rima Bolaños MD at [address] [place] [address] 02/02/2021    EGD BIOPSY and spot marking performed by Rocky Pierre MD at [address] [place] [address] N/A 02/12/2021    ENDOSCOPIC ULTRASOUND, EGD performed by Pop Singh MD at [place] [address]  02/12/2021    EGD DIAGNOSTIC ONLY performed by Pop Singh MD at [place] [address] N/A 08/31/2021    EGD BIOPSY performed by Rocky Pierre MD at [address] [place] [address] 01/21/2022    EGD BIOPSY performed by Rocky Pierre MD at [address] [place] [address] N/A 04/15/2022    EGD ESOPHAGOGASTRODUODENOSCOPY performed by Rima Bolaños MD at [address] [place] [address] 06/06/2022    EGD BAND LIGATION performed by Tiera Ornelas MD at [address] [place] [address] N/A 06/09/2022    EGD ESOPHAGOGASTRODUODENOSCOPY performed by Tiera Ornelas MD at [address] [place] [address] N/A 9/14/2022    EGD ESOPHAGOGASTRODUODENOSCOPY ENDOSCOPIC APC AT GE JUNCTION performed by Virgen Leon MD at [address] [place] [address] N/A 10/19/2022    EGD BIOPSY performed by Rocky Pierre MD at [address] [facility] [facility]       Family History   Problem Relation Age of Onset    Cancer Mother         pancreatic    Cancer Father         bone    Diabetes Sister     Asthma Brother        SOCIAL HISTORY       Social History     Socioeconomic History Marital status: Single     Spouse name: None    Number of children: None    Years of education: None    Highest education level: None   Tobacco Use    Smoking status: Former     Packs/day: 1.00     Years: 45.00     Pack years: 45.00     Types: Cigarettes     Quit date: 2017     Years since quittin.7    Smokeless tobacco: Never   Vaping Use    Vaping Use: Never used   Substance and Sexual Activity    Alcohol use: Not Currently     Comment: Quit alcohol in 2019- heavier drinking prior to quitting    Drug use: Not Currently     Frequency: 1.0 times per week     Types: Cocaine     Comment: Cocaine- stopped spring 2016    Sexual activity: Yes     Partners: Female   Social History Narrative     in the past, retired     Social Determinants of Health     Financial Resource Strain: Low Risk     Difficulty of Paying Living Expenses: Not hard at all   Food Insecurity: No Food Insecurity    Worried About [employer] in the Last Year: Never true    [facility] in the Last Year: Never true   Physical Activity: Inactive    Days of Exercise per Week: 0 days    Minutes of Exercise per Session: 0 min           REVIEW OF SYSTEMS      No Known Allergies    Current Outpatient Medications on File Prior to Encounter   Medication Sig Dispense Refill    FEROSUL 325 (65 Fe) MG tablet take 1 tablet by mouth twice a day 60 tablet 5    nadolol (CORGARD) 20 MG tablet take 1 tablet by mouth once daily 30 tablet 2    midodrine (PROAMATINE) 5 MG tablet Take 2 tablets by mouth in the morning and 2 tablets at noon and 2 tablets before bedtime.  90 tablet 3    furosemide (LASIX) 20 MG tablet Take 1 tablet by mouth 2 times daily 60 tablet 3    spironolactone (ALDACTONE) 100 MG tablet Take 1 tablet by mouth every evening (Patient not taking: Reported on 10/21/2022) 30 tablet 1    ondansetron (ZOFRAN-ODT) 4 MG disintegrating tablet dissolve 1 tablet by mouth every 8 hours if needed for nausea and vomiting 10 tablet 0 lactulose (CHRONULAC) 10 GM/15ML solution take 30 milliliters by mouth three times a day 473 mL 1    FLUoxetine (PROZAC) 20 MG capsule Take 1 capsule by mouth daily 30 capsule 3    atorvastatin (LIPITOR) 20 MG tablet Take 1 tablet by mouth nightly 30 tablet 3     No current facility-administered medications on file prior to encounter. Review of Systems   Constitutional: Negative. HENT: Negative. Eyes:  Positive for visual disturbance. Eye glasses    Respiratory:  Positive for shortness of breath. Cardiovascular: Negative. Gastrointestinal:  Positive for abdominal distention. Genitourinary: Negative. Musculoskeletal: Negative. Hematological:  Bruises/bleeds easily. Bilateral arms    Psychiatric/Behavioral: Negative. GENERAL PHYSICAL EXAM     Vitals: /61   Pulse 71   Temp 97.1 °F (36.2 °C) (Infrared)   Resp 12   SpO2 97%  There is no height or weight on file to calculate BMI. GENERAL APPEARANCE:   Cheryle Copp is 60 y.o.,  male, not obese, nourished, conscious, alert. Does not appear to be distress or pain at this time. Physical Exam  Constitutional:       Appearance: Normal appearance. He is normal weight. HENT:      Head: Normocephalic. Right Ear: External ear normal.      Left Ear: External ear normal.      Nose: Nose normal.      Mouth/Throat:      Mouth: Mucous membranes are moist.      Comments: Multiple dental caries upper and lower   Eyes:      General:         Right eye: No discharge. Left eye: No discharge. Cardiovascular:      Rate and Rhythm: Normal rate and regular rhythm. Pulses: Normal pulses. Radial pulses are 2+ on the right side and 2+ on the left side. Dorsalis pedis pulses are 2+ on the right side and 2+ on the left side. Posterior tibial pulses are 2+ on the right side and 2+ on the left side. Heart sounds: Normal heart sounds.    Pulmonary: Effort: Pulmonary effort is normal.      Breath sounds: Normal breath sounds. No wheezing or rales. Abdominal:      General: Bowel sounds are normal. There is distension. Palpations: Abdomen is soft. There is no mass. Tenderness: There is no abdominal tenderness. Comments: Ascites    Musculoskeletal:         General: No swelling or tenderness. Cervical back: Normal range of motion and neck supple. Right lower leg: No edema. Left lower leg: No edema. Skin:     General: Skin is warm and dry. Findings: Bruising present. Comments: Bilateral arms    Neurological:      General: No focal deficit present. Mental Status: He is alert and oriented to person, place, and time. Motor: No weakness.       Gait: Gait normal.   Psychiatric:         Mood and Affect: Mood normal.         Behavior: Behavior normal.                 PROVISIONAL DIAGNOSES / SURGERY:      Paracentesis      Alcoholic cirrhosis     Patient Active Problem List    Diagnosis Date Noted    GI bleed 09/13/2022    Secondary esophageal varices ([facility] 06/07/2022    Esophageal polyp 06/07/2022    Drop in hemoglobin 06/03/2022    Portal hypertension (HCC)     Shortness of breath     Ascites 04/26/2022    Acute kidney failure, unspecified ([address] 04/21/2022    Muscle weakness (generalized) 07/28/2016    Other abnormalities of gait and mobility 07/28/2016    Anemia 04/13/2022    Acute kidney injury ([address] 04/13/2022    Esophageal adenocarcinoma ([facility]     Low hemoglobin 12/20/2021    Symptomatic anemia, microcytic, acute 12/20/2021    Hypotension 12/20/2021    Former smoker, 50+ pack years, quit 2016 12/20/2021    HLD (hyperlipidemia) 12/20/2021    Abnormal findings on diagnostic imaging of spine 12/14/2021    Cervical spinal stenosis 12/14/2021    Spinal stenosis of lumbar region with neurogenic claudication 12/14/2021    Severe comorbid illness 11/30/2021    Gait instability 11/30/2021    Current smoker 04/05/2021 COVID-19 02/23/2021    Anxiety 02/23/2021    Malignant neoplasm of lower third of esophagus (HCC)     Hypocalcemia 12/26/2020    Hypophosphatemia 12/26/2020    Gastrointestinal hemorrhage with melena     Alcohol abuse     Altered mental status     Acute gastrointestinal bleeding 12/23/2020    Thrombocytopenia ([facility] 12/23/2020    Hepatitis C virus infection resolved after antiviral drug therapy 12/23/2020    Cervical facet syndrome 11/23/2020    Lumbar facet arthropathy 08/17/2020    Elevated LFTs 08/12/2020    Seasonal allergies 08/12/2020    S/P epidural steroid injection 08/05/2020    Essential hypertension 04/24/2019    Recurrent major depressive disorder in partial remission ([address] 04/24/2019    Pure hypercholesterolemia 02/04/2019    Hypokalemia 02/04/2019    Vitamin D deficiency 09/20/2017    History of hepatitis C 09/11/2017    Ganglion cyst 05/31/2017    Carpal tunnel syndrome of right wrist 05/31/2017    Tinnitus 03/23/2017    Eustachian tube dysfunction 03/23/2017    Lumbar radiculitis 11/08/2016    Lumbar disc herniation 11/08/2016    Gynecomastia, male 10/26/2016    Depression 10/13/2016    Vertebrogenic low back pain 10/06/2016    DDD (degenerative disc disease), lumbar 10/06/2016    Hepatic cirrhosis ([address] 09/15/2016    Psychophysiologic insomnia 09/14/2016    Cirrhosis ([address]     Hep C w/o coma, chronic (HCC)     Fatty liver     Calculus of gallbladder without cholecystitis 08/10/2016    Chronic viral hepatitis B without delta agent and without coma ([address] 07/22/2016    Hypomagnesemia     Alcohol withdrawal syndrome without complication ([address] 48/58/3589    Cervical radicular pain 01/04/2016    Tubular adenoma of colon 01/01/2016    History of colon polyps 01/01/2016    Back pain, chronic 04/19/2012    Hearing difficulty 04/19/2012    GERD (gastroesophageal reflux disease) 04/19/2012           MASSIMO Hadley - CNP on 10/28/2022 at 1:02 PM

## 2022-10-29 ENCOUNTER — HOSPITAL ENCOUNTER (INPATIENT)
Age: 62
LOS: 3 days | Discharge: HOME HEALTH | DRG: 432 | End: 2022-11-01
Attending: EMERGENCY MEDICINE | Admitting: INTERNAL MEDICINE
Payer: MEDICARE

## 2022-10-29 DIAGNOSIS — D64.9 SYMPTOMATIC ANEMIA: Primary | ICD-10-CM

## 2022-10-29 LAB
ABSOLUTE EOS #: 0 K/UL (ref 0–0.4)
ABSOLUTE LYMPH #: 0.41 K/UL (ref 1–4.8)
ABSOLUTE MONO #: 0.77 K/UL (ref 0.1–1.3)
ANION GAP SERPL CALCULATED.3IONS-SCNC: 13 MMOL/L (ref 9–17)
BASOPHILS # BLD: 0 % (ref 0–2)
BASOPHILS ABSOLUTE: 0 K/UL (ref 0–0.2)
BUN BLDV-MCNC: 13 MG/DL (ref 8–23)
CALCIUM SERPL-MCNC: 8 MG/DL (ref 8.6–10.4)
CHLORIDE BLD-SCNC: 97 MMOL/L (ref 98–107)
CO2: 18 MMOL/L (ref 20–31)
CREAT SERPL-MCNC: 0.71 MG/DL (ref 0.7–1.2)
EOSINOPHILS RELATIVE PERCENT: 0 % (ref 0–4)
GFR SERPL CREATININE-BSD FRML MDRD: >60 ML/MIN/1.73M2
GLUCOSE BLD-MCNC: 118 MG/DL (ref 70–99)
HCT VFR BLD CALC: 18.2 % (ref 41–53)
HCT VFR BLD CALC: 20.2 % (ref 41–53)
HEMOGLOBIN: 5.6 G/DL (ref 13.5–17.5)
HEMOGLOBIN: 6 G/DL (ref 13.5–17.5)
LYMPHOCYTES # BLD: 7 % (ref 24–44)
MCH RBC QN AUTO: 26 PG (ref 26–34)
MCHC RBC AUTO-ENTMCNC: 30.9 G/DL (ref 31–37)
MCV RBC AUTO: 84.4 FL (ref 80–100)
MONOCYTES # BLD: 13 % (ref 1–7)
MORPHOLOGY: ABNORMAL
PDW BLD-RTO: 17.1 % (ref 11.5–14.9)
PLATELET # BLD: 176 K/UL (ref 150–450)
PMV BLD AUTO: 7.5 FL (ref 6–12)
POTASSIUM SERPL-SCNC: 4.1 MMOL/L (ref 3.7–5.3)
RBC # BLD: 2.16 M/UL (ref 4.5–5.9)
SEG NEUTROPHILS: 80 % (ref 36–66)
SEGMENTED NEUTROPHILS ABSOLUTE COUNT: 4.72 K/UL (ref 1.3–9.1)
SODIUM BLD-SCNC: 128 MMOL/L (ref 135–144)
WBC # BLD: 5.9 K/UL (ref 3.5–11)

## 2022-10-29 PROCEDURE — 6360000002 HC RX W HCPCS

## 2022-10-29 PROCEDURE — 86900 BLOOD TYPING SEROLOGIC ABO: CPT

## 2022-10-29 PROCEDURE — 86850 RBC ANTIBODY SCREEN: CPT

## 2022-10-29 PROCEDURE — 85014 HEMATOCRIT: CPT

## 2022-10-29 PROCEDURE — 86920 COMPATIBILITY TEST SPIN: CPT

## 2022-10-29 PROCEDURE — 99223 1ST HOSP IP/OBS HIGH 75: CPT | Performed by: INTERNAL MEDICINE

## 2022-10-29 PROCEDURE — 85018 HEMOGLOBIN: CPT

## 2022-10-29 PROCEDURE — 6370000000 HC RX 637 (ALT 250 FOR IP)

## 2022-10-29 PROCEDURE — 2580000003 HC RX 258

## 2022-10-29 PROCEDURE — 80048 BASIC METABOLIC PNL TOTAL CA: CPT

## 2022-10-29 PROCEDURE — 86901 BLOOD TYPING SEROLOGIC RH(D): CPT

## 2022-10-29 PROCEDURE — C9113 INJ PANTOPRAZOLE SODIUM, VIA: HCPCS

## 2022-10-29 PROCEDURE — 99285 EMERGENCY DEPT VISIT HI MDM: CPT

## 2022-10-29 PROCEDURE — 85025 COMPLETE CBC W/AUTO DIFF WBC: CPT

## 2022-10-29 PROCEDURE — 36430 TRANSFUSION BLD/BLD COMPNT: CPT

## 2022-10-29 PROCEDURE — P9016 RBC LEUKOCYTES REDUCED: HCPCS

## 2022-10-29 PROCEDURE — 36415 COLL VENOUS BLD VENIPUNCTURE: CPT

## 2022-10-29 PROCEDURE — 2060000000 HC ICU INTERMEDIATE R&B

## 2022-10-29 RX ORDER — SODIUM CHLORIDE 0.9 % (FLUSH) 0.9 %
5-40 SYRINGE (ML) INJECTION EVERY 12 HOURS SCHEDULED
Status: DISCONTINUED | OUTPATIENT
Start: 2022-10-29 | End: 2022-11-01 | Stop reason: HOSPADM

## 2022-10-29 RX ORDER — FUROSEMIDE 20 MG/1
20 TABLET ORAL 2 TIMES DAILY
Status: DISCONTINUED | OUTPATIENT
Start: 2022-10-29 | End: 2022-11-01 | Stop reason: HOSPADM

## 2022-10-29 RX ORDER — SODIUM CHLORIDE 9 MG/ML
INJECTION, SOLUTION INTRAVENOUS PRN
Status: DISCONTINUED | OUTPATIENT
Start: 2022-10-29 | End: 2022-11-01 | Stop reason: SDUPTHER

## 2022-10-29 RX ORDER — FERROUS SULFATE 325(65) MG
325 TABLET ORAL 2 TIMES DAILY WITH MEALS
Status: DISCONTINUED | OUTPATIENT
Start: 2022-10-29 | End: 2022-10-30

## 2022-10-29 RX ORDER — ACETAMINOPHEN 650 MG/1
650 SUPPOSITORY RECTAL EVERY 6 HOURS PRN
Status: DISCONTINUED | OUTPATIENT
Start: 2022-10-29 | End: 2022-10-30

## 2022-10-29 RX ORDER — POLYETHYLENE GLYCOL 3350 17 G/17G
17 POWDER, FOR SOLUTION ORAL DAILY PRN
Status: DISCONTINUED | OUTPATIENT
Start: 2022-10-29 | End: 2022-11-01 | Stop reason: HOSPADM

## 2022-10-29 RX ORDER — ONDANSETRON 4 MG/1
4 TABLET, ORALLY DISINTEGRATING ORAL EVERY 8 HOURS PRN
Status: DISCONTINUED | OUTPATIENT
Start: 2022-10-29 | End: 2022-11-01 | Stop reason: HOSPADM

## 2022-10-29 RX ORDER — ONDANSETRON 2 MG/ML
4 INJECTION INTRAMUSCULAR; INTRAVENOUS EVERY 6 HOURS PRN
Status: DISCONTINUED | OUTPATIENT
Start: 2022-10-29 | End: 2022-11-01 | Stop reason: HOSPADM

## 2022-10-29 RX ORDER — LACTULOSE 10 G/15ML
30 SOLUTION ORAL 3 TIMES DAILY
Status: DISCONTINUED | OUTPATIENT
Start: 2022-10-29 | End: 2022-11-01 | Stop reason: HOSPADM

## 2022-10-29 RX ORDER — ATORVASTATIN CALCIUM 20 MG/1
20 TABLET, FILM COATED ORAL NIGHTLY
Status: DISCONTINUED | OUTPATIENT
Start: 2022-10-29 | End: 2022-11-01 | Stop reason: HOSPADM

## 2022-10-29 RX ORDER — MIDODRINE HYDROCHLORIDE 10 MG/1
10 TABLET ORAL 3 TIMES DAILY
Status: DISCONTINUED | OUTPATIENT
Start: 2022-10-29 | End: 2022-10-30

## 2022-10-29 RX ORDER — SODIUM CHLORIDE 9 MG/ML
INJECTION, SOLUTION INTRAVENOUS PRN
Status: DISCONTINUED | OUTPATIENT
Start: 2022-10-29 | End: 2022-11-01 | Stop reason: HOSPADM

## 2022-10-29 RX ORDER — NADOLOL 20 MG/1
20 TABLET ORAL DAILY
Status: DISCONTINUED | OUTPATIENT
Start: 2022-10-29 | End: 2022-11-01 | Stop reason: HOSPADM

## 2022-10-29 RX ORDER — SODIUM CHLORIDE 9 MG/ML
INJECTION, SOLUTION INTRAVENOUS PRN
Status: DISCONTINUED | OUTPATIENT
Start: 2022-10-29 | End: 2022-10-30

## 2022-10-29 RX ORDER — ACETAMINOPHEN 325 MG/1
650 TABLET ORAL EVERY 6 HOURS PRN
Status: DISCONTINUED | OUTPATIENT
Start: 2022-10-29 | End: 2022-10-30

## 2022-10-29 RX ORDER — SODIUM CHLORIDE 0.9 % (FLUSH) 0.9 %
5-40 SYRINGE (ML) INJECTION PRN
Status: DISCONTINUED | OUTPATIENT
Start: 2022-10-29 | End: 2022-11-01 | Stop reason: HOSPADM

## 2022-10-29 RX ORDER — SODIUM CHLORIDE 9 MG/ML
INJECTION, SOLUTION INTRAVENOUS PRN
Status: DISCONTINUED | OUTPATIENT
Start: 2022-10-29 | End: 2022-10-29

## 2022-10-29 RX ADMIN — SODIUM CHLORIDE: 9 INJECTION, SOLUTION INTRAVENOUS at 22:05

## 2022-10-29 RX ADMIN — ONDANSETRON 4 MG: 2 INJECTION INTRAMUSCULAR; INTRAVENOUS at 21:42

## 2022-10-29 RX ADMIN — ATORVASTATIN CALCIUM 20 MG: 20 TABLET, FILM COATED ORAL at 21:43

## 2022-10-29 RX ADMIN — SODIUM CHLORIDE 8 MG/HR: 9 INJECTION, SOLUTION INTRAVENOUS at 16:31

## 2022-10-29 RX ADMIN — Medication 10 ML: at 21:48

## 2022-10-29 RX ADMIN — ACETAMINOPHEN 650 MG: 325 TABLET, FILM COATED ORAL at 21:43

## 2022-10-29 RX ADMIN — FUROSEMIDE 20 MG: 20 TABLET ORAL at 21:43

## 2022-10-29 RX ADMIN — FERROUS SULFATE TAB 325 MG (65 MG ELEMENTAL FE) 325 MG: 325 (65 FE) TAB at 21:52

## 2022-10-29 RX ADMIN — MIDODRINE HYDROCHLORIDE 10 MG: 10 TABLET ORAL at 21:43

## 2022-10-29 RX ADMIN — NADOLOL 20 MG: 20 TABLET ORAL at 21:52

## 2022-10-29 RX ADMIN — OCTREOTIDE ACETATE 50 MCG/HR: 1000 INJECTION, SOLUTION INTRAVENOUS; SUBCUTANEOUS at 22:02

## 2022-10-29 RX ADMIN — CEFTRIAXONE SODIUM 1000 MG: 1 INJECTION, POWDER, FOR SOLUTION INTRAMUSCULAR; INTRAVENOUS at 15:28

## 2022-10-29 RX ADMIN — SODIUM CHLORIDE 80 MG: 9 INJECTION, SOLUTION INTRAVENOUS at 16:08

## 2022-10-29 ASSESSMENT — PAIN DESCRIPTION - LOCATION: LOCATION: HEAD

## 2022-10-29 ASSESSMENT — ENCOUNTER SYMPTOMS
DIARRHEA: 0
RHINORRHEA: 0
WHEEZING: 0
BACK PAIN: 0
EYE DISCHARGE: 0
EYE REDNESS: 0
CONSTIPATION: 0
SINUS PRESSURE: 0
TROUBLE SWALLOWING: 0
FACIAL SWELLING: 0
VOMITING: 0
EYE PAIN: 0
NAUSEA: 0
COLOR CHANGE: 0
COUGH: 0
ABDOMINAL DISTENTION: 1
SHORTNESS OF BREATH: 0
SORE THROAT: 0
ABDOMINAL PAIN: 0
BLOOD IN STOOL: 1
CHEST TIGHTNESS: 0

## 2022-10-29 ASSESSMENT — PAIN DESCRIPTION - DESCRIPTORS: DESCRIPTORS: ACHING

## 2022-10-29 ASSESSMENT — PAIN - FUNCTIONAL ASSESSMENT: PAIN_FUNCTIONAL_ASSESSMENT: NONE - DENIES PAIN

## 2022-10-29 NOTE — H&P
[address]      [address] [facility]     HISTORY AND PHYSICAL EXAMINATION            Date:   10/29/2022  Patient name:  Faisal Ivy  Date of admission:  10/29/2022 10:53 AM  MRN:   [id number]  Account:  [de-identified]  YOB: 1960  PCP:    VASU Sheth  Room:   06/06  Code Status:    Full Code    Chief Complaint:     Chief Complaint   Patient presents with    Abnormal Lab     HGB 5.1       History Obtained From:     patient, electronic medical record    History of Present Illness: The patient is a 60 y.o.   male with hx of esophageal AC s/p chemoradiation, Hep C, alcoholic liver cirrhosis with portal hypertension and variceal bleeding, with multiple transfusions, who presents withAbnormal Lab (HGB 5.1) after he was told so by Dr. Guanaco Felix his Gastroenterologist and is admitted for the management of acute blood loss anemia    Patient reports that he gets paracentesis for ascites once every week, and hemoglobin levels once a week as well. Per patient and notes, patient had a hemoglobin level check on Friday 10/21/2022 which was 6.4 and therefore he was transfused a unit of blood. He was told to check his CBC on Saturday, and was found today to have a hemoglobin of 5.1. Patient was advised to go to the ED immediately for admission and further management. Patient had his last paracentesis yesterday. Patient reports similar previous episodes, however, he said that this time he did not have any abdominal pain preceding the episode, however he reports dark tarry stools. Patient has a history of esophageal varices, that were previously banded.  He also reports that on 90/14 he had some areas cauterized    Patient last endoscopy was 1 week ago which showed small esophageal varices, and an ulcer at the GE junction from the previous variceal banding. Patient also has sliding hiatal hernia, angiectasia and portal hypertensive gastropathy was also appreciated. On September, patient had an EGD, which showed, Multiple angiectasias in gastric fundus and esophagogastric junction presumably related to prior radiation. During admission, paracentesis hemoglobin level of and 5.6. Patient had a soft blood pressure 109/57 followed by 91/55, not tachycardic. Patient was immediately transfused 1 unit hemoglobin, and another unit was also pending. Patient started on octreotide infusion, pantoprazole infusion, as well as ceftriaxone IV as a prophylactic measure for underlying risk of SBP. CT chest abdomen pelvis with IV contrast is planned for 11/03/2022 for follow-up on esophageal adenocarcinoma.     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 Callum Cali, orthopedic, every 3-4 months, gets steroid injection    Lezama esophagus     BPH (benign prostatic hypertrophy)     Cholelithiasis     Cirrhosis ([address]     COVID-19 12/2020    pt reports he had a positive test while at [facility] in 2020, was asymptomatic    COVID-19 vaccine series completed 5/20/2021, 6/22/2021    Moderna 5/20/2021, 6/22/2021    DDD (degenerative disc disease), lumbar     Depression     Esophageal cancer ([address]     INVASIVE ADENOCARCINOMA ARISING IN TUBULAR ADENOMA WITH HIGH GRADE DYSPLASIA, ASSOCIATED WITH FOCAL INTESTINAL METAPLASIA     Esophageal varices ([address]     Fatty liver     GERD (gastroesophageal reflux disease)     Hep C w/o coma, chronic ([address]     History of alcohol abuse     6-12 beers a day; quit drinking 2019    History of blood transfusion     History of colon polyps 2016    History of tobacco abuse     [place] (hard of hearing)     Hyperlipidemia     Hypertension     Hyponatremia 07/20/2016 Port-A-Cath in place     right upper chest    Portal hypertension ([address]     Sciatica     Secondary esophageal varices ([address] 06/07/2022    Shortness of breath     Spinal stenosis     Stomach ulcer     hx of    Tubular adenoma of colon 2016, 2018    Vitamin D deficiency     Wears glasses         Past SurgicalHistory:     Past Surgical History:   Procedure Laterality Date    BUNIONECTOMY      twice on right side    BUNIONECTOMY Left     CARPAL TUNNEL RELEASE Right     COLONOSCOPY      at age 35    COLONOSCOPY  10/05/2016    polyps-pathology tubular adenoma, and abnormal looking mucosa right colon-pathology-tubular adenoma    COLONOSCOPY N/A 03/30/2018    COLONOSCOPY POLYPECTOMY COLD BIOPSY performed by Casey Adkins MD at [address]  03/30/2018    Small polyp in the sigmoid colon and excised with biopsy forceps--tubular adenoma    COLONOSCOPY N/A 04/16/2022    COLONOSCOPY POLYPECTOMY performed by Casey Adkins MD at [facility] [place], DIAGNOSTIC      EGD    IR PORT PLACEMENT EQUAL OR GREATER THAN 5 YEARS  04/19/2021    IR PORT PLACEMENT EQUAL OR GREATER THAN 5 YEARS 4/19/2021 ST SPECIAL PROCEDURES    KNEE SURGERY Left     cyst removed    NASAL SEPTUM SURGERY      NERVE BLOCK Right 11/23/2020    NERVE BLOCK RIGHT CERVICAL STEROID INJECTION  C3-C6 performed by Annette Halsted, MD at [facility]  01/04/2016    steroid injection C7 T1    OTHER SURGICAL HISTORY  11/21/2016    Bilateral Lumbar CACHORRO L4-L5 injections    OTHER SURGICAL HISTORY  12/19/2016    lumbar steroid injection    OTHER SURGICAL HISTORY  09/28/2018    BILATERAL L5 CACHORRO (N/A Back)    OTHER SURGICAL HISTORY Right 11/23/2020    cervical injection    PAIN MANAGEMENT PROCEDURE Left 07/09/2020    EPIDURAL STEROID INJECTION LEFT L4 L5 performed by Annette Halsted, MD at [address]  Left 07/20/2020    LEFT L4 L5 EPIDURAL STEROID INJECTION performed by Annette Halsted, MD at [address] [place] [address] 9/14/2022    EGD ESOPHAGOGASTRODUODENOSCOPY ENDOSCOPIC APC AT GE JUNCTION performed by Lorie Luna MD at [address] 10/19/2022    EGD BIOPSY performed by Be Hansen MD at [address] [place] [address]          Medications Prior to Admission:        Prior to Admission medications    Medication Sig Start Date End Date Taking? Authorizing Provider   FEROSUL 325 (65 Fe) MG tablet take 1 tablet by mouth twice a day 10/7/22   VASU Sethi   nadolol (CORGARD) 20 MG tablet take 1 tablet by mouth once daily 8/26/22   MASSIMO Son - NP   midodrine (PROAMATINE) 5 MG tablet Take 2 tablets by mouth in the morning and 2 tablets at noon and 2 tablets before bedtime. 8/4/22   MASSIMO Son - NP   furosemide (LASIX) 20 MG tablet Take 1 tablet by mouth 2 times daily 6/23/22   MASSIMO Son - NP   spironolactone (ALDACTONE) 100 MG tablet Take 1 tablet by mouth every evening  Patient not taking: Reported on 10/21/2022 6/23/22   MASSIMO Son NP   ondansetron (ZOFRAN-ODT) 4 MG disintegrating tablet dissolve 1 tablet by mouth every 8 hours if needed for nausea and vomiting 5/31/22   VASU Sethi   lactulose [facility]) 10 GM/15ML solution take 30 milliliters by mouth three times a day 5/31/22   VASU Sethi   FLUoxetine (PROZAC) 20 MG capsule Take 1 capsule by mouth daily 4/21/22   Krishan Forbes MD   atorvastatin (LIPITOR) 20 MG tablet Take 1 tablet by mouth nightly 4/21/22   Krishan Forbes MD        Allergies:     Patient has no known allergies. Social History:     Tobacco:    reports that he quit smoking about 5 years ago. His smoking use included cigarettes. He has a 45.00 pack-year smoking history. He has never used smokeless tobacco.  Alcohol:      reports that he does not currently use alcohol. Drug Use:  reports that he does not currently use drugs after having used the following drugs: Cocaine. Frequency: 1.00 time per week. Family History:     Family History   Problem Relation Age of Onset    Cancer Mother         pancreatic    Cancer Father         bone    Diabetes Sister     Asthma Brother        Review of Systems:     Positive and Negative as described in HPI. CONSTITUTIONAL:  no fevers  Psych: Normal mood and affect, no depression, no suicidal ideation. EYES: negative for blury vision  HEENT: No headaches, no nasal congestion, no difficulty swallowing  RESPIRATORY:negative for dyspnea, no wheezing, no Cough  CARDIOVASCULAR: negative for chest pain, no palpitations  GASTROINTESTINAL: no nausea, no vomiting, + black tarry stools, + abdominal distention secondary to ascites  GENITOURINARY: negative for dysuria, no hematuria   MUSCULOSKELETAL: no joint pains, no muscle aches, no swelling of joints or extremities  NEUROLOGICAL: Mild lightheadedness. Weakness  Physical Exam:   /63   Pulse 87   Temp 98.5 °F (36.9 °C) (Oral)   Resp 16   Ht 5' 10\" (1.778 m)   Wt 200 lb (90.7 kg)   SpO2 99%   BMI 28.70 kg/m²   Temp (24hrs), Av.3 °F (36.8 °C), Min:98 °F (36.7 °C), Max:98.5 °F (36.9 °C)        No results for input(s): POCGLU in the last 72 hours. Intake/Output Summary (Last 24 hours) at 10/29/2022 1523  Last data filed at 10/29/2022 1522  Gross per 24 hour   Intake 522.92 ml   Output --   Net 522.92 ml       PHYSICAL EXAM:  General Appearance  Alert , awake, not in acute distress, patient looks pale  HEENT - Head is normocephalic, atraumatic. Psych: Normal mood and affect, normal behavior, not agitated, maintaining good eye contact   Neck: supple, no rigidity, normal ROM, no neck swellings, normal thyroid gland  Lungs - Bilateral equal air entry, no wheezes, rales or rhonchi, aeration good  Cardiovascular - Heart sounds are normal.  Regular rhythm, normal rate without murmur, gallop or rub.   Abdomen - Soft, nontender, significantly distended with shifting dullness  Neurologic - There are no new focal motor or sensory deficits  Extremities - No cyanosis, clubbing or edema    Investigations:     Laboratory Testing:  Recent Results (from the past 24 hour(s))   Comprehensive Metabolic Panel    Collection Time: 10/28/22  4:31 PM   Result Value Ref Range    Glucose 128 (H) 70 - 99 mg/dL    BUN 15 8 - 23 mg/dL    Creatinine 0.90 0.70 - 1.20 mg/dL    Est, Glom Filt Rate >60 >60 mL/min/1.73m2    Calcium 8.1 (L) 8.6 - 10.4 mg/dL    Sodium 124 (L) 135 - 144 mmol/L    Potassium 3.8 3.7 - 5.3 mmol/L    Chloride 93 (L) 98 - 107 mmol/L    CO2 19 (L) 20 - 31 mmol/L    Anion Gap 12 9 - 17 mmol/L    Alkaline Phosphatase 87 40 - 129 U/L    ALT 10 5 - 41 U/L    AST 23 <40 U/L    Total Bilirubin 1.0 0.3 - 1.2 mg/dL    Total Protein 5.3 (L) 6.4 - 8.3 g/dL    Albumin 3.6 3.5 - 5.2 g/dL   CBC with Auto Differential    Collection Time: 10/28/22  4:31 PM   Result Value Ref Range    WBC 3.6 3.5 - 11.0 k/uL    RBC 2.01 (L) 4.5 - 5.9 m/uL    Hemoglobin 5.1 (LL) 13.5 - 17.5 g/dL    Hematocrit 16.8 (L) 41 - 53 %    MCV 83.5 80 - 100 fL    MCH 25.1 (L) 26 - 34 pg    MCHC 30.1 (L) 31 - 37 g/dL    RDW 16.5 (H) 11.5 - 14.9 %    Platelets 512 (L) 964 - 450 k/uL    MPV 6.8 6.0 - 12.0 fL    Seg Neutrophils 82 (H) 36 - 66 %    Lymphocytes 11 (L) 24 - 44 %    Monocytes 7 1 - 7 %    Eosinophils % 0 0 - 4 %    Basophils 0 0 - 2 %    Segs Absolute 2.95 1.3 - 9.1 k/uL    Absolute Lymph # 0.40 (L) 1.0 - 4.8 k/uL    Absolute Mono # 0.25 0.1 - 1.3 k/uL    Absolute Eos # 0.00 0.0 - 0.4 k/uL    Basophils Absolute 0.00 0.0 - 0.2 k/uL    Morphology ANISOCYTOSIS PRESENT     Morphology HYPOCHROMIA PRESENT     Morphology HYPERSEGMENTED NEUTROPHILS PRESENT     Morphology 1+ POLYCHROMASIA     Morphology SLT Rouleaux     Morphology FEW ELLIPTOCYTES     Morphology FEW TEARDROPS    TYPE AND SCREEN    Collection Time: 10/29/22 11:10 AM   Result Value Ref Range    Expiration Date 11/01/2022,2359     Arm Band Number [id number]     ABO/Rh A2B POSITIVE Antibody Screen NEGATIVE     A1 Lectin NEGATIVE     Unit Number [id number]     Product Code Leukocyte Reduced Red Cell     Unit Divison 00     Dispense Status ISSUED     Unit Issue Date/Time [id number]     Product Code Blood Bank [id number]     Blood Bank Unit Type and Rh O POS     Blood Bank ISBT Product Blood Type 5100     Blood Bank Blood Product Expiration Date 221567772853     Transfusion Status OK TO TRANSFUSE     Crossmatch Result COMPATIBLE    CBC with Auto Differential    Collection Time: 10/29/22 11:10 AM   Result Value Ref Range    WBC 5.9 3.5 - 11.0 k/uL    RBC 2.16 (L) 4.5 - 5.9 m/uL    Hemoglobin 5.6 (LL) 13.5 - 17.5 g/dL    Hematocrit 18.2 (L) 41 - 53 %    MCV 84.4 80 - 100 fL    MCH 26.0 26 - 34 pg    MCHC 30.9 (L) 31 - 37 g/dL    RDW 17.1 (H) 11.5 - 14.9 %    Platelets 281 993 - 931 k/uL    MPV 7.5 6.0 - 12.0 fL    Seg Neutrophils 80 (H) 36 - 66 %    Lymphocytes 7 (L) 24 - 44 %    Monocytes 13 (H) 1 - 7 %    Eosinophils % 0 0 - 4 %    Basophils 0 0 - 2 %    Segs Absolute 4.72 1.3 - 9.1 k/uL    Absolute Lymph # 0.41 (L) 1.0 - 4.8 k/uL    Absolute Mono # 0.77 0.1 - 1.3 k/uL    Absolute Eos # 0.00 0.0 - 0.4 k/uL    Basophils Absolute 0.00 0.0 - 0.2 k/uL    Morphology ANISOCYTOSIS PRESENT     Morphology HYPOCHROMIA PRESENT     Morphology 1+ POLYCHROMASIA     Morphology FEW ELLIPTOCYTES    Basic Metabolic Panel    Collection Time: 10/29/22 11:10 AM   Result Value Ref Range    Glucose 118 (H) 70 - 99 mg/dL    BUN 13 8 - 23 mg/dL    Creatinine 0.71 0.70 - 1.20 mg/dL    Est, Glom Filt Rate >60 >60 mL/min/1.73m2    Calcium 8.0 (L) 8.6 - 10.4 mg/dL    Sodium 128 (L) 135 - 144 mmol/L    Potassium 4.1 3.7 - 5.3 mmol/L    Chloride 97 (L) 98 - 107 mmol/L    CO2 18 (L) 20 - 31 mmol/L    Anion Gap 13 9 - 17 mmol/L         Current Facility-Administered Medications   Medication Dose Route Frequency Provider Last Rate Last Admin    0.9 % sodium chloride infusion   IntraVENous PRN Leonora Ortiz MD        sodium chloride flush 0.9 % injection 5-40 mL  5-40 mL IntraVENous 2 times per day Darling Victor MD        sodium chloride flush 0.9 % injection 5-40 mL  5-40 mL IntraVENous PRN Darling Victor MD        0.9 % sodium chloride infusion   IntraVENous PRN Darling Victor MD        ondansetron (ZOFRAN-ODT) disintegrating tablet 4 mg  4 mg Oral Q8H PRN Darling Victor MD        Or    ondansetron (ZOFRAN) injection 4 mg  4 mg IntraVENous Q6H PRN Darling Victor MD        polyethylene glycol (GLYCOLAX) packet 17 g  17 g Oral Daily PRN Darling Victor MD        acetaminophen (TYLENOL) tablet 650 mg  650 mg Oral Q6H PRN Darling Victor MD        Or    acetaminophen (TYLENOL) suppository 650 mg  650 mg Rectal Q6H PRN Darling Victor MD        pantoprazole (PROTONIX) 80 mg in sodium chloride 0.9 % 50 mL bolus  80 mg IntraVENous Once Darling Victor MD        pantoprazole (PROTONIX) 80 mg in sodium chloride 0.9 % 100 mL infusion  8 mg/hr IntraVENous Continuous Darling Victor MD        [START ON 11/1/2022] pantoprazole (PROTONIX) 40 mg in sodium chloride (PF) 0.9 % 10 mL injection  40 mg IntraVENous Daily Darling Victor MD        cefTRIAXone (ROCEPHIN) 1,000 mg in sodium chloride 0.9 % 50 mL IVPB mini-bag  1,000 mg IntraVENous Q24H Darling Victor MD        octreotide (SANDOSTATIN) 500 mcg in sodium chloride 0.9 % 100 mL infusion  50 mcg/hr IntraVENous Continuous Darling Victor MD         Current Outpatient Medications   Medication Sig Dispense Refill    FEROSUL 325 (65 Fe) MG tablet take 1 tablet by mouth twice a day 60 tablet 5    nadolol (CORGARD) 20 MG tablet take 1 tablet by mouth once daily 30 tablet 2    midodrine (PROAMATINE) 5 MG tablet Take 2 tablets by mouth in the morning and 2 tablets at noon and 2 tablets before bedtime.  90 tablet 3    furosemide (LASIX) 20 MG tablet Take 1 tablet by mouth 2 times daily 60 tablet 3    spironolactone (ALDACTONE) 100 MG tablet Take 1 tablet by mouth every evening (Patient not taking: Reported on 10/21/2022) 30 tablet 1    ondansetron (ZOFRAN-ODT) 4 MG disintegrating tablet dissolve 1 tablet by mouth every 8 hours if needed for nausea and vomiting 10 tablet 0    lactulose (CHRONULAC) 10 GM/15ML solution take 30 milliliters by mouth three times a day 473 mL 1    FLUoxetine (PROZAC) 20 MG capsule Take 1 capsule by mouth daily 30 capsule 3    atorvastatin (LIPITOR) 20 MG tablet Take 1 tablet by mouth nightly 30 tablet 3     Facility-Administered Medications Ordered in Other Encounters   Medication Dose Route Frequency Provider Last Rate Last Admin    0.9 % sodium chloride infusion   IntraVENous Continuous Brown Clements MD        sodium chloride flush 0.9 % injection 5-40 mL  5-40 mL IntraVENous 2 times per day Brown Clements MD        sodium chloride flush 0.9 % injection 5-40 mL  5-40 mL IntraVENous PRN Brown Clements MD        0.9 % sodium chloride infusion   IntraVENous PRN Brown Clements MD        acetaminophen (TYLENOL) tablet 650 mg  650 mg Oral Q4H PRN Brown Clements MD           Imaging/Diagnostics:    IR US GUIDED PARACENTESIS    Result Date: 10/28/2022  PROCEDURE: PARACENTESIS WITH IMAGE GUIDANCE US ABDOMEN LIMITED 10/28/2022 HISTORY: ORDERING SYSTEM PROVIDED HISTORY: Alcoholic cirrhosis of liver with ascites ([facility] [address] [address] TECHNOLOGIST PROVIDED HISTORY: Weekly due o amount drained every 2 weeks TECHNIQUE: Informed consent was obtained after a detailed explanation of the procedure including risks, benefits, and alternatives. Universal protocol was followed. A limited ultrasound of the abdomen was performed and demonstrates a large amount of ascites. The right abdomen was prepped and draped in sterile fashion and local anesthesia was achieved with lidocaine. A 5 Dutch needle sheath was advanced into ascites using realtime ultrasound guidance and paracentesis was performed. The patient tolerated the procedure well. EBL: None FINDINGS: Limited ultrasound of the abdomen demonstrates ascites.  A total of 8.1 L of slightly turbid yellow colored fluid was removed. Supplemental albumin was given intravenously     Successful ultrasound-guided paracentesis. Assessment :      Primary Problem  GI bleed    Active Hospital Problems    Diagnosis Date Noted    GI bleed [K92.2] 09/13/2022     Priority: Medium     Patient is a 58-year-old male with history mainly remarkable for esophageal adenocarcinoma status post chemoradiation, alcoholic liver cirrhosis with portal hypertension and esophageal varices who presents with a picture of acute blood loss anemia secondary to GI bleed in the setting of dark tarry stools. Patient had a hemoglobin around 6 on 10/21/2022 , transfused unit of PRBCs and today had a hemoglobin of 5.1 indicating acute significant GI bleed. Patient is admitted for further management and is received of 2 units of blood at the moment. GI consulted, patient is vitally stable for now. Prognosis guarded  Plan:     Patient status Admit as inpatient in the  Progressive Unit/Step down    Acute anemia most likely secondary to upper GI blood loss in the setting of varices and telangiectasias  -GI consulted, Dr. Bautista Records. Possible EGD   Patient received 1 packed RBC, second pending  H&H every 6 hours  Patient on octreotide infusion, PPI infusion. Continue nadolol, continue midodrine. .  Please keep close eye on vitals  Patient n.p.o.     Liver cirrhosis secondary to chronic alcoholism  Continue patient on furosemide 20 mg twice daily  Continue lactulose 30 mg 3 times daily  Continue nadolol, continue midodrine      Hyperlipidemia  Continue atorvastatin      Consultations:   IP CONSULT TO PRIMARY CARE PROVIDER  IP CONSULT TO GI  IP CONSULT TO SOCIAL WORK    DVT prophylaxis: reason for no prophylaxis: Acute GI bleed  GI prophylaxis:  Protonix infusion     Patient is admitted as inpatient status because of co-morbiditieslisted above, severity of signs and symptoms as outlined, requirement for current medical therapies and most importantly because of direct risk to patient if care not provided in a hospital setting. Denia Malin MD  10/29/2022  3:23 PM    Copy sent to VASU Alva    I have discussed the care of Yan Griffin , including pertinent history and exam findings,    today with the resident. I have seen and examined the patient and the key elements of all parts of the encounter have been performed by me . I agree with the assessment, plan and orders as documented by the resident. Principal Problem:    GI bleed  Resolved Problems:    * No resolved hospital problems. *        Overall  course ;                                   are improving over time.         Patient mated after getting a call from GI physician office with low hemoglobin  Has black tarry stools  Abdominal discomfort,  Recent paracentesis next  Had recent EGD  History hepatitis C and alcoholism  Will start on IV Protonix, octreotide drip, Rocephin to cover SBP prophylaxis  GI consult  Blood transfusion to keep hemoglobin more than 7  Overall prognosis guarded          Electronically signed by Dereck Lopez MD

## 2022-10-29 NOTE — ED NOTES
Mode of arrival (squad #, walk in, police, etc) : walk in        Chief complaint(s): Low HGB        Arrival Note (brief scenario, treatment PTA, etc). : Pt states he was sent in by Dr Kelli Perry for a low HGB at 5.1. Pt states he also had a paracentesis yesterday. C= \"Have you ever felt that you should Cut down on your drinking? \"  No  A= \"Have people Annoyed you by criticizing your drinking? \"  No  G= \"Have you ever felt bad or Guilty about your drinking? \"  No  E= \"Have you ever had a drink as an Eye-opener first thing in the morning to steady your nerves or to help a hangover? \"  No      Deferred []      Reason for deferring: N/A    *If yes to two or more: probable alcohol abuse. *       Ericka Cantor  10/29/22 1102

## 2022-10-29 NOTE — PLAN OF CARE
Problem: Discharge Planning  Goal: Discharge to home or other facility with appropriate resources  Outcome: Progressing  Flowsheets (Taken 10/29/2022 1851)  Discharge to home or other facility with appropriate resources:   Identify barriers to discharge with patient and caregiver   Arrange for needed discharge resources and transportation as appropriate   Refer to discharge planning if patient needs post-hospital services based on physician order or complex needs related to functional status, cognitive ability or social support system     Problem: Safety - Adult  Goal: Free from fall injury  Outcome: Progressing  Flowsheets (Taken 10/29/2022 1851)  Free From Fall Injury:   Instruct family/caregiver on patient safety   Based on caregiver fall risk screen, instruct family/caregiver to ask for assistance with transferring infant if caregiver noted to have fall risk factors     Problem: ABCDS Injury Assessment  Goal: Absence of physical injury  Outcome: Progressing  Flowsheets (Taken 10/29/2022 1851)  Absence of Physical Injury: Implement safety measures based on patient assessment

## 2022-10-29 NOTE — PROGRESS NOTES
Patient arrived to floor via wheelchair. Heart monitor with patches applied, vital signs taken, orientation to room given, bed in lowest position with side rails up x2, call light and personal belongings within reach and current plan of care discussed with patient. All questions/concerns addressed at this time. Electronically signed by Sydni Mercado RN.

## 2022-10-29 NOTE — PROGRESS NOTES
RN called and spoke with Dr. Shane Hill about patient refusal to start peripheral IV in arms but willing to have IV in ankle; RN received approval via phone to start peripheral IV in patient's ankle. Electronically signed by Rasheed Montoya RN.

## 2022-10-29 NOTE — ED NOTES
Report called to [facility]. No further questions at this time.       Kallie Griffin RN  10/29/22 4639

## 2022-10-29 NOTE — CONSENT
Informed Consent for Blood Component Transfusion Note    I have discussed with the patient the rationale for blood component transfusion; its benefits in treating or preventing fatigue, organ damage, or death; and its risk which includes mild transfusion reactions, rare risk of blood borne infection, or more serious but rare reactions. I have discussed the alternatives to transfusion, including the risk and consequences of not receiving transfusion. The patient had an opportunity to ask questions and had agreed to proceed with transfusion of blood components.     Electronically signed by Elly Meza MD on 10/29/22 at 11:13 AM EDT

## 2022-10-29 NOTE — ED PROVIDER NOTES
[address]  eMERGENCY dEPARTMENT eNCOUnter      Pt Name: Katey Toscano  MRN: [id number]  [place] 1960  Date of evaluation: 10/29/22      CHIEF COMPLAINT       Chief Complaint   Patient presents with    Abnormal Lab     HGB 5.1         HISTORY OF PRESENT ILLNESS    Katey Toscano is a 60 y.o. male who presents complaining of anemia. Patient has a history of GI bleeding esophageal cancer and liver cirrhosis has had multiple transfusions in the past.  Patient came in yesterday had some blood work done and had a paracentesis. Patient states that he was called today and told to come in for hospitalization and blood transfusion. Patient's hemoglobin was 5.1 yesterday. Patient has been having dark tarry stools. Patient denies any change to his chronic pain but states that he does have some distention even after they took 9 L out of his abdomen yesterday. Patient is feeling a little lightheaded dizzy and a little unsteady when he walks. Patient denies chest pain or shortness of breath. REVIEW OF SYSTEMS       Review of Systems   Constitutional:  Negative for activity change, appetite change, chills, diaphoresis and fever. HENT:  Negative for congestion, ear pain, facial swelling, nosebleeds, rhinorrhea, sinus pressure, sore throat and trouble swallowing. Eyes:  Negative for pain, discharge and redness. Respiratory:  Negative for cough, chest tightness, shortness of breath and wheezing. Cardiovascular:  Negative for chest pain, palpitations and leg swelling. Gastrointestinal:  Positive for abdominal distention and blood in stool. Negative for abdominal pain, constipation, diarrhea, nausea and vomiting. Genitourinary:  Negative for difficulty urinating, dysuria, flank pain, frequency, genital sores and hematuria. Musculoskeletal:  Negative for arthralgias, back pain, gait problem, joint swelling, myalgias and neck pain. Skin:  Negative for color change, pallor, rash and wound. COLONOSCOPY      at age 35    COLONOSCOPY  10/05/2016    polyps-pathology tubular adenoma, and abnormal looking mucosa right colon-pathology-tubular adenoma    COLONOSCOPY N/A 03/30/2018    COLONOSCOPY POLYPECTOMY COLD BIOPSY performed by Shikha Horta MD at [address],[place] [address]  03/30/2018    Small polyp in the sigmoid colon and excised with biopsy forceps--tubular adenoma    COLONOSCOPY N/A 04/16/2022    COLONOSCOPY [facility]OMY [facility], COLON, DIAGNOSTIC      EGD    IR PORT PLACEMENT EQUAL OR GREATER THAN 5 YEARS  04/19/2021    IR PORT PLACEMENT EQUAL OR GREATER THAN 5 YEARS 4/19/2021 [facility] PROCEDURES    KNEE SURGERY Left     cyst removed    NASAL SEPTUM SURGERY      NERVE BLOCK Right 11/23/2020    NERVE BLOCK RIGHT CERVICAL STEROID INJECTION  C3-C6 performed by Dori Mukherjee MD at [address] [place] [address]  01/04/2016    steroid injection C7 T1    OTHER SURGICAL HISTORY  11/21/2016    Bilateral Lumbar CACHORRO L4-L5 injections    OTHER SURGICAL HISTORY  12/19/2016    lumbar steroid injection    OTHER SURGICAL HISTORY  09/28/2018    BILATERAL L5 CACHORRO (N/A Back)    OTHER SURGICAL HISTORY Right 11/23/2020    cervical injection    PAIN MANAGEMENT PROCEDURE Left 07/09/2020    EPIDURAL STEROID INJECTION LEFT L4 L5 performed by Dori Mukherjee MD at [address] [place] [address] Left 07/20/2020    LEFT L4 L5 EPIDURAL STEROID INJECTION performed by Dori Mukherjee MD at [address] [place] [address] Bilateral 08/17/2020    LUMBAR FACET BILATERAL L2-L5 performed by Dori Mukherjee MD at [address] [place] [address] Bilateral 12/07/2020    NERVE BLOCK BILATERAL LUMBAR MEDIAL BRANCH L2-L5 performed by Dori Mukherjee MD at [address] [place] [address]      [facility] [place] [facility] AA&/STRD TFRML EPI LUMBAR/SACRAL 1 LEVEL Bilateral 09/06/2018    BILATERAL L5 CACHORRO performed by Dori Mukherjee MD at [facility] AA&/STRD TFRML EPI LUMBAR/SACRAL 1 LEVEL N/A 09/28/2018    BILATERAL L5 CACHORRO performed by Nick Soler MD at [address] N/CARPAL TUNNEL SURG Right 08/29/2017    CARPAL TUNNEL RELEASE RIGHT performed by Mai Yung MD at [facility] N/CARPAL TUNNEL SURG Left 10/31/2017    CARPAL TUNNEL RELEASE performed by Mai Yung MD at [facility] 12/29/2020    EGD BIOPSY performed by Ankit Chung MD at [facility] 02/02/2021    EGD BIOPSY and spot marking performed by Fina Hong MD at [facility] 02/12/2021    ENDOSCOPIC ULTRASOUND, EGD performed by Monik Quinteros MD at [address] [place] [address]  02/12/2021    EGD DIAGNOSTIC ONLY performed by Monik Quinteros MD at [address] [place] [address] N/A 08/31/2021    EGD BIOPSY performed by Fina Hong MD at [address] [facility] 01/21/2022    EGD BIOPSY performed by Fina Hong MD at [address] [place] [address] 04/15/2022    EGD ESOPHAGOGASTRODUODENOSCOPY performed by Ankit Cuhng MD at [facility] 06/06/2022    EGD BAND LIGATION performed by Glenwood Frankel, MD at [facility] N/A 06/09/2022    EGD ESOPHAGOGASTRODUODENOSCOPY performed by Glenwood Frankel, MD at [address] [place] [address] 9/14/2022    EGD ESOPHAGOGASTRODUODENOSCOPY ENDOSCOPIC APC AT [facility] performed by Callum Marshall MD at [facility] 10/19/2022    EGD BIOPSY performed by Fina Hong MD at [address] [facility] [address]       Previous Medications    ATORVASTATIN (LIPITOR) 20 MG TABLET    Take 1 tablet by mouth nightly    FEROSUL 325 (65 FE) MG TABLET    take 1 tablet by mouth twice a day    FLUOXETINE (PROZAC) 20 MG CAPSULE    Take 1 capsule by mouth daily    FUROSEMIDE (LASIX) 20 MG TABLET    Take 1 tablet by mouth 2 times daily    LACTULOSE (CHRONULAC) 10 GM/15ML SOLUTION    take 30 milliliters by mouth three times a day    MIDODRINE (PROAMATINE) 5 MG TABLET    Take 2 tablets by mouth in the morning and 2 tablets at noon and 2 tablets before bedtime. NADOLOL (CORGARD) 20 MG TABLET    take 1 tablet by mouth once daily    ONDANSETRON (ZOFRAN-ODT) 4 MG DISINTEGRATING TABLET    dissolve 1 tablet by mouth every 8 hours if needed for nausea and vomiting    SPIRONOLACTONE (ALDACTONE) 100 MG TABLET    Take 1 tablet by mouth every evening       ALLERGIES     has No Known Allergies. SOCIAL HISTORY      reports that he quit smoking about 5 years ago. His smoking use included cigarettes. He has a 45.00 pack-year smoking history. He has never used smokeless tobacco. He reports that he does not currently use alcohol. He reports that he does not currently use drugs after having used the following drugs: Cocaine. Frequency: 1.00 time per week. PHYSICAL EXAM     INITIAL VITALS: /64   Pulse 85   Temp 98 °F (36.7 °C) (Oral)   Resp 19   Ht 5' 10\" (1.778 m)   Wt 200 lb (90.7 kg)   SpO2 100%   BMI 28.70 kg/m²      Physical Exam  Vitals and nursing note reviewed. Constitutional:       General: He is not in acute distress. Appearance: He is well-developed. He is not diaphoretic. HENT:      Head: Normocephalic and atraumatic. Eyes:      General: No scleral icterus. Right eye: No discharge. Left eye: No discharge. Conjunctiva/sclera: Conjunctivae normal.      Pupils: Pupils are equal, round, and reactive to light. Cardiovascular:      Rate and Rhythm: Normal rate and regular rhythm. Heart sounds: Normal heart sounds. No murmur heard. No friction rub. No gallop. Pulmonary:      Effort: Pulmonary effort is normal. No respiratory distress.       Breath sounds: Normal breath sounds. No wheezing or rales. Chest:      Chest wall: No tenderness. Abdominal:      General: Bowel sounds are normal. There is distension. Palpations: Abdomen is soft. There is no mass. Tenderness: There is no abdominal tenderness. There is no guarding or rebound. Musculoskeletal:         General: No tenderness. Normal range of motion. Skin:     General: Skin is warm and dry. Coloration: Skin is not pale. Findings: No erythema or rash. Neurological:      Mental Status: He is alert and oriented to person, place, and time. Cranial Nerves: No cranial nerve deficit. Sensory: No sensory deficit. Motor: No abnormal muscle tone. Coordination: Coordination normal.      Deep Tendon Reflexes: Reflexes normal.   Psychiatric:         Behavior: Behavior normal.         Thought Content: Thought content normal.         Judgment: Judgment normal.       DIAGNOSTIC RESULTS     RADIOLOGY:All plain film, CT,MRI, and formal ultrasound images (except ED bedside ultrasound) are read by the radiologist and the interpretations are directly viewed by the emergency physician. LABS: All lab results were reviewed by myself, and all abnormals are listed below. Labs Reviewed   CBC WITH AUTO DIFFERENTIAL - Abnormal; Notable for the following components:       Result Value    RBC 2.16 (*)     Hemoglobin 5.6 (*)     Hematocrit 18.2 (*)     MCHC 30.9 (*)     RDW 17.1 (*)     Seg Neutrophils 80 (*)     Lymphocytes 7 (*)     Monocytes 13 (*)     Absolute Lymph # 0.41 (*)     All other components within normal limits   BASIC METABOLIC PANEL - Abnormal; Notable for the following components:    Glucose 118 (*)     Calcium 8.0 (*)     Sodium 128 (*)     Chloride 97 (*)     CO2 18 (*)     All other components within normal limits   TYPE AND SCREEN         MEDICAL DECISION MAKING:     Patient overall looks well but has a significantly low hemoglobin.   We will go ahead and set up for blood

## 2022-10-30 LAB
ABSOLUTE EOS #: 0.12 K/UL (ref 0–0.4)
ABSOLUTE LYMPH #: 0.17 K/UL (ref 1–4.8)
ABSOLUTE MONO #: 0.75 K/UL (ref 0.1–1.3)
AMMONIA: 111 UMOL/L (ref 16–60)
ANION GAP SERPL CALCULATED.3IONS-SCNC: 11 MMOL/L (ref 9–17)
BASOPHILS # BLD: 1 % (ref 0–2)
BASOPHILS ABSOLUTE: 0.06 K/UL (ref 0–0.2)
BUN BLDV-MCNC: 12 MG/DL (ref 8–23)
CALCIUM SERPL-MCNC: 8.1 MG/DL (ref 8.6–10.4)
CHLORIDE BLD-SCNC: 99 MMOL/L (ref 98–107)
CO2: 19 MMOL/L (ref 20–31)
CREAT SERPL-MCNC: 0.69 MG/DL (ref 0.7–1.2)
EOSINOPHILS RELATIVE PERCENT: 2 % (ref 0–4)
GFR SERPL CREATININE-BSD FRML MDRD: >60 ML/MIN/1.73M2
GLUCOSE BLD-MCNC: 122 MG/DL (ref 70–99)
HCT VFR BLD CALC: 21.5 % (ref 41–53)
HCT VFR BLD CALC: 24.6 % (ref 41–53)
HCT VFR BLD CALC: 25.8 % (ref 41–53)
HCT VFR BLD CALC: 26.1 % (ref 41–53)
HCT VFR BLD CALC: 27.4 % (ref 41–53)
HEMOGLOBIN: 6.8 G/DL (ref 13.5–17.5)
HEMOGLOBIN: 7.8 G/DL (ref 13.5–17.5)
HEMOGLOBIN: 7.8 G/DL (ref 13.5–17.5)
HEMOGLOBIN: 8.1 G/DL (ref 13.5–17.5)
HEMOGLOBIN: 8.4 G/DL (ref 13.5–17.5)
LYMPHOCYTES # BLD: 3 % (ref 24–44)
MCH RBC QN AUTO: 26 PG (ref 26–34)
MCHC RBC AUTO-ENTMCNC: 30.4 G/DL (ref 31–37)
MCV RBC AUTO: 85.4 FL (ref 80–100)
METAMYELOCYTES ABSOLUTE COUNT: 0.12 K/UL
METAMYELOCYTES: 2 %
MONOCYTES # BLD: 13 % (ref 1–7)
MORPHOLOGY: ABNORMAL
PDW BLD-RTO: 16.4 % (ref 11.5–14.9)
PLATELET # BLD: 141 K/UL (ref 150–450)
PMV BLD AUTO: 7.2 FL (ref 6–12)
POTASSIUM SERPL-SCNC: 4.4 MMOL/L (ref 3.7–5.3)
RBC # BLD: 3.02 M/UL (ref 4.5–5.9)
SEG NEUTROPHILS: 79 % (ref 36–66)
SEGMENTED NEUTROPHILS ABSOLUTE COUNT: 4.58 K/UL (ref 1.3–9.1)
SODIUM BLD-SCNC: 129 MMOL/L (ref 135–144)
WBC # BLD: 5.8 K/UL (ref 3.5–11)

## 2022-10-30 PROCEDURE — 85018 HEMOGLOBIN: CPT

## 2022-10-30 PROCEDURE — 99233 SBSQ HOSP IP/OBS HIGH 50: CPT | Performed by: INTERNAL MEDICINE

## 2022-10-30 PROCEDURE — 2580000003 HC RX 258

## 2022-10-30 PROCEDURE — 6360000002 HC RX W HCPCS

## 2022-10-30 PROCEDURE — 85014 HEMATOCRIT: CPT

## 2022-10-30 PROCEDURE — P9047 ALBUMIN (HUMAN), 25%, 50ML: HCPCS | Performed by: STUDENT IN AN ORGANIZED HEALTH CARE EDUCATION/TRAINING PROGRAM

## 2022-10-30 PROCEDURE — 6370000000 HC RX 637 (ALT 250 FOR IP): Performed by: STUDENT IN AN ORGANIZED HEALTH CARE EDUCATION/TRAINING PROGRAM

## 2022-10-30 PROCEDURE — C9113 INJ PANTOPRAZOLE SODIUM, VIA: HCPCS

## 2022-10-30 PROCEDURE — 2060000000 HC ICU INTERMEDIATE R&B

## 2022-10-30 PROCEDURE — 82140 ASSAY OF AMMONIA: CPT

## 2022-10-30 PROCEDURE — P9016 RBC LEUKOCYTES REDUCED: HCPCS

## 2022-10-30 PROCEDURE — 36415 COLL VENOUS BLD VENIPUNCTURE: CPT

## 2022-10-30 PROCEDURE — 80048 BASIC METABOLIC PNL TOTAL CA: CPT

## 2022-10-30 PROCEDURE — 85025 COMPLETE CBC W/AUTO DIFF WBC: CPT

## 2022-10-30 PROCEDURE — 99222 1ST HOSP IP/OBS MODERATE 55: CPT | Performed by: INTERNAL MEDICINE

## 2022-10-30 PROCEDURE — 36430 TRANSFUSION BLD/BLD COMPNT: CPT

## 2022-10-30 PROCEDURE — 6360000002 HC RX W HCPCS: Performed by: STUDENT IN AN ORGANIZED HEALTH CARE EDUCATION/TRAINING PROGRAM

## 2022-10-30 PROCEDURE — 6370000000 HC RX 637 (ALT 250 FOR IP)

## 2022-10-30 RX ORDER — FERROUS SULFATE 325(65) MG
325 TABLET ORAL EVERY OTHER DAY
Status: DISCONTINUED | OUTPATIENT
Start: 2022-10-31 | End: 2022-11-01 | Stop reason: HOSPADM

## 2022-10-30 RX ORDER — SODIUM CHLORIDE 9 MG/ML
INJECTION, SOLUTION INTRAVENOUS PRN
Status: DISCONTINUED | OUTPATIENT
Start: 2022-10-30 | End: 2022-11-01 | Stop reason: SDUPTHER

## 2022-10-30 RX ORDER — ALBUMIN (HUMAN) 12.5 G/50ML
25 SOLUTION INTRAVENOUS EVERY 8 HOURS
Status: DISCONTINUED | OUTPATIENT
Start: 2022-10-30 | End: 2022-10-30

## 2022-10-30 RX ORDER — MIDODRINE HYDROCHLORIDE 5 MG/1
5 TABLET ORAL 3 TIMES DAILY PRN
Status: DISCONTINUED | OUTPATIENT
Start: 2022-10-30 | End: 2022-11-01 | Stop reason: HOSPADM

## 2022-10-30 RX ORDER — MIDODRINE HYDROCHLORIDE 10 MG/1
10 TABLET ORAL 4 TIMES DAILY
Status: DISCONTINUED | OUTPATIENT
Start: 2022-10-30 | End: 2022-11-01 | Stop reason: HOSPADM

## 2022-10-30 RX ORDER — OXYCODONE HYDROCHLORIDE 5 MG/1
2.5 TABLET ORAL EVERY 6 HOURS PRN
Status: DISCONTINUED | OUTPATIENT
Start: 2022-10-30 | End: 2022-11-01 | Stop reason: HOSPADM

## 2022-10-30 RX ORDER — SODIUM CHLORIDE 9 MG/ML
INJECTION, SOLUTION INTRAVENOUS PRN
Status: DISCONTINUED | OUTPATIENT
Start: 2022-10-30 | End: 2022-11-01 | Stop reason: HOSPADM

## 2022-10-30 RX ADMIN — ONDANSETRON 4 MG: 4 TABLET, ORALLY DISINTEGRATING ORAL at 13:10

## 2022-10-30 RX ADMIN — MIDODRINE HYDROCHLORIDE 10 MG: 10 TABLET ORAL at 07:40

## 2022-10-30 RX ADMIN — ALBUMIN (HUMAN) 25 G: 0.25 INJECTION, SOLUTION INTRAVENOUS at 07:40

## 2022-10-30 RX ADMIN — CEFTRIAXONE SODIUM 1000 MG: 1 INJECTION, POWDER, FOR SOLUTION INTRAMUSCULAR; INTRAVENOUS at 16:33

## 2022-10-30 RX ADMIN — ONDANSETRON 4 MG: 2 INJECTION INTRAMUSCULAR; INTRAVENOUS at 21:25

## 2022-10-30 RX ADMIN — ATORVASTATIN CALCIUM 20 MG: 20 TABLET, FILM COATED ORAL at 21:26

## 2022-10-30 RX ADMIN — MIDODRINE HYDROCHLORIDE 10 MG: 10 TABLET ORAL at 21:25

## 2022-10-30 RX ADMIN — MIDODRINE HYDROCHLORIDE 10 MG: 10 TABLET ORAL at 17:51

## 2022-10-30 RX ADMIN — OCTREOTIDE ACETATE 50 MCG/HR: 1000 INJECTION, SOLUTION INTRAVENOUS; SUBCUTANEOUS at 13:14

## 2022-10-30 RX ADMIN — LACTULOSE 30 G: 20 SOLUTION ORAL at 21:29

## 2022-10-30 RX ADMIN — SODIUM CHLORIDE 8 MG/HR: 9 INJECTION, SOLUTION INTRAVENOUS at 02:19

## 2022-10-30 RX ADMIN — MIDODRINE HYDROCHLORIDE 5 MG: 5 TABLET ORAL at 18:45

## 2022-10-30 RX ADMIN — OXYCODONE HYDROCHLORIDE 2.5 MG: 5 TABLET ORAL at 14:03

## 2022-10-30 RX ADMIN — ACETAMINOPHEN 650 MG: 325 TABLET, FILM COATED ORAL at 13:12

## 2022-10-30 RX ADMIN — MIDODRINE HYDROCHLORIDE 10 MG: 10 TABLET ORAL at 13:10

## 2022-10-30 ASSESSMENT — PAIN DESCRIPTION - LOCATION: LOCATION: EYE;HEAD

## 2022-10-30 ASSESSMENT — ENCOUNTER SYMPTOMS
APNEA: 0
SINUS PRESSURE: 0
RECTAL PAIN: 0
ABDOMINAL DISTENTION: 1
ABDOMINAL PAIN: 0
COUGH: 0
CHEST TIGHTNESS: 0
TROUBLE SWALLOWING: 0
BLOOD IN STOOL: 1
WHEEZING: 0
CONSTIPATION: 0
SINUS PAIN: 0
VOMITING: 0
NAUSEA: 0
CHOKING: 0
BLOOD IN STOOL: 0
PHOTOPHOBIA: 0
VOICE CHANGE: 0
DIARRHEA: 0
SHORTNESS OF BREATH: 0
SORE THROAT: 0
BACK PAIN: 0

## 2022-10-30 ASSESSMENT — PAIN SCALES - GENERAL
PAINLEVEL_OUTOF10: 8
PAINLEVEL_OUTOF10: 7

## 2022-10-30 NOTE — PLAN OF CARE
Problem: Discharge Planning  Goal: Discharge to home or other facility with appropriate resources  10/30/2022 0625 by Cathy Tafoya RN  Outcome: Progressing     Problem: Safety - Adult  Goal: Free from fall injury  10/30/2022 0625 by Cathy Tafoya RN  Outcome: Progressing  Flowsheets (Taken 10/30/2022 0625)  Free From Fall Injury:   [place] family/caregiver on patient safety   Based on caregiver fall risk screen, instruct family/caregiver to ask for assistance with transferring infant if caregiver noted to have fall risk factors     Problem: ABCDS Injury Assessment  Goal: Absence of physical injury  10/30/2022 0625 by Cathy Tafoya RN  Outcome: Progressing  Flowsheets (Taken 10/30/2022 3324)  Absence of Physical Injury: Implement safety measures based on patient assessment

## 2022-10-30 NOTE — CONSULTS
GI Consult Note:    Name: Daniela Bernal  MRN: [id number]     [place]: [de-identified]  Room: 2115/2115-01    Admit Date: 10/29/2022  PCP: VASU Haney    Physician Requesting Consult: Christina Anders MD     Reason for Consult: Significant anemia, melanotic stools    Chief Complaint:     Chief Complaint   Patient presents with    Abnormal Lab     HGB 5.1       History Obtained From:     patient, electronic medical record, GI APRN    History of Present Illness:      Daniela Bernal is a  60 y.o.  male who presents with Abnormal Lab (HGB 5.1)      Symptoms:  Patient seen at [facility].  On return from emergency department with a history of significantly low hemoglobin. On further discussion patient denies symptoms including dizziness weakness etc.  No nausea vomiting. He did have melanotic stools for a few days prior to coming to the hospital.  However today he had a brown color stools. Tolerating diet well. He does have chronic heartburns. He is known to have cirrhosis of the liver secondary to alcoholism. Known to have portal hypertension, variceal bleeding in the past.  Also known to have portal hypertensive gastropathy. 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 Known to have small sliding hiatal hernia. Patient had chemoradiation therapy in the past.    Patient was evaluated at [address] [place] [address] and felt that he is not a good candidate for surgery. Initially basing on the EUS, it was felt that he has stage II cancer of the esophagus. Recent EGD did not reveal residual lesion at the previous biopsy site. However he developed angio ectasia at the GE junction and cardia area causing bleeding intermittently. Patient is having refractory ascites and needing abdominal paracentesis every week. Also GI APRN notes reviewed which is as follows:    50-year-old male sent to ED for abnormal labs.   Hgb 5.1 on Friday. Patient well known to our service. Patient has hx of  hepatitis C, cirrhosis, EtOH abuse, esophageal cancer, esophageal varices, HTN. Last EGD 10/19/22. Small varices noted. Has friable mucosa from GE junction towards fundus. PHG. Has been having recurrent ascites. Has paracentesis every Friday. Last para 10/28 with 8 L removed. Reports had a dark brown BM the other day but denies any tarry black stools. No nausea, vomiting. Tolerating diet. Does not appear to follow a 2 gm sodium diet. On lasix and aldactone. No BLE edema  No encephalopathy     Does endorse weakness, fatigue, mild dizziness with changes in position. On Midodrine  Was given Albumin today     Review of Systems   Constitutional:  Positive for fatigue. Negative for appetite change and fever. HENT:  Negative for mouth sores, sore throat, trouble swallowing and voice change. Eyes:         No ictirus   Respiratory:  Negative for apnea, cough, choking, shortness of breath and wheezing. Cardiovascular:  Negative for chest pain, palpitations and leg swelling. Gastrointestinal:  Positive for abdominal distention. Negative for abdominal pain, blood in stool, constipation, diarrhea, nausea, rectal pain and vomiting. Endocrine: Negative for polydipsia, polyphagia and polyuria. Genitourinary:  Negative for frequency, hematuria and urgency. Musculoskeletal:  Negative for arthralgias, back pain, gait problem and joint swelling. Skin:  Negative for pallor and rash. Allergic/Immunologic: Negative for food allergies. Neurological:  Positive for dizziness and weakness. Negative for seizures and headaches. Hematological:  Negative for adenopathy. Does not bruise/bleed easily. Psychiatric/Behavioral:  Negative for sleep disturbance. The patient is not nervous/anxious.       Past Medical History:     Past Medical History:   Diagnosis Date    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 ([facility] [address] [address]     Alcoholic cirrhosis of liver with ascites ([address]     Anemia 04/13/2022    Anxiety     Arthritis     Back pain, chronic     dr. Hong Castro, orthopedic, every 3-4 months, gets steroid injection    Lezama esophagus     BPH (benign prostatic hypertrophy)     Cholelithiasis     Cirrhosis ([address]     COVID-19 12/2020    pt reports he had a positive test while at [facility] in 2020, was asymptomatic    COVID-19 vaccine series completed 5/20/2021, 6/22/2021    Moderna 5/20/2021, 6/22/2021    DDD (degenerative disc disease), lumbar     Depression     Esophageal cancer ([address]     INVASIVE ADENOCARCINOMA ARISING IN TUBULAR ADENOMA WITH HIGH GRADE DYSPLASIA, ASSOCIATED WITH FOCAL INTESTINAL METAPLASIA     Esophageal varices ([address]     Fatty liver     GERD (gastroesophageal reflux disease)     Hep C w/o coma, chronic ([address]     History of alcohol abuse     6-12 beers a day; quit drinking 2019    History of blood transfusion     History of colon polyps 2016    History of tobacco abuse     [place] (hard of hearing)     Hyperlipidemia     Hypertension     Hyponatremia 07/20/2016    Port-A-Cath in place     right upper chest    Portal hypertension ([address]     Sciatica     Secondary esophageal varices ([address] 06/07/2022    Shortness of breath     Spinal stenosis     Stomach ulcer     hx of    Tubular adenoma of colon 2016, 2018    Vitamin D deficiency     Wears glasses         Past Surgical History:     Past Surgical History:   Procedure Laterality Date    BUNIONECTOMY      twice on right side    BUNIONECTOMY Left     CARPAL TUNNEL RELEASE Right     COLONOSCOPY      at age 35    COLONOSCOPY  10/05/2016    polyps-pathology tubular adenoma, and abnormal looking mucosa right colon-pathology-tubular adenoma    COLONOSCOPY N/A 03/30/2018    COLONOSCOPY POLYPECTOMY COLD BIOPSY performed by Ezra Rajan MD at [facility]  03/30/2018    Small polyp in the sigmoid colon and excised with biopsy forceps--tubular adenoma    COLONOSCOPY N/A 04/16/2022    COLONOSCOPY POLYPECTOMY performed by Be Hansen MD at [facility], COLON, DIAGNOSTIC      EGD    IR PORT PLACEMENT EQUAL OR GREATER THAN 5 YEARS  04/19/2021    IR PORT PLACEMENT EQUAL OR GREATER THAN 5 YEARS 4/19/2021 [facility] SPECIAL PROCEDURES    KNEE SURGERY Left     cyst removed    NASAL SEPTUM SURGERY      NERVE BLOCK Right 11/23/2020    NERVE BLOCK RIGHT CERVICAL STEROID INJECTION  C3-C6 performed by Maria Antonia Boyce MD at [address]  01/04/2016    steroid injection C7 T1    OTHER SURGICAL HISTORY  11/21/2016    Bilateral Lumbar CACHORRO L4-L5 injections    OTHER SURGICAL HISTORY  12/19/2016    lumbar steroid injection    OTHER SURGICAL HISTORY  09/28/2018    BILATERAL L5 CACHORRO (N/A Back)    OTHER SURGICAL HISTORY Right 11/23/2020    cervical injection    PAIN MANAGEMENT PROCEDURE Left 07/09/2020    EPIDURAL STEROID INJECTION LEFT L4 L5 performed by Maria Antonia Boyce MD at [facility] Left 07/20/2020    LEFT L4 L5 EPIDURAL STEROID INJECTION performed by Maria Antonia Boyce MD at [facility] Bilateral 08/17/2020    LUMBAR FACET BILATERAL L2-L5 performed by Maria Antonia Boyce MD at [facility] Bilateral 12/07/2020    NERVE BLOCK BILATERAL LUMBAR MEDIAL BRANCH L2-L5 performed by Maria Antonia Boyce MD at [address] [place] [address] [place] [address] AA&/STRD TFRML EPI LUMBAR/SACRAL 1 LEVEL Bilateral 09/06/2018    BILATERAL L5 CACHORRO performed by Maria Antonia Boyce MD at [facility] AA&/STRD TFRML EPI LUMBAR/SACRAL 1 LEVEL N/A 09/28/2018    BILATERAL L5 CACHORRO performed by Maria Antonia Boyce MD at [address] [place] [address] N/CARPAL TUNNEL SURG Right 08/29/2017    CARPAL TUNNEL RELEASE RIGHT performed by Mel Mcneill MD at [address] [place] [address] N/CARPAL TUNNEL SURG Left 10/31/2017    CARPAL TUNNEL RELEASE performed by Mel Mcneill MD at [address] [facility] 12/29/2020    EGD BIOPSY performed by Lucian MD Cherri at [address] [place] [address] 02/02/2021    EGD BIOPSY and spot marking performed by Rocky Pierre MD at [address] [place] [address] 02/12/2021    ENDOSCOPIC ULTRASOUND, EGD performed by Pop Singh MD at [place] [address]  02/12/2021    EGD DIAGNOSTIC ONLY performed by Pop Singh MD at [place] [address] 08/31/2021    EGD BIOPSY performed by Rocky Pierre MD at [address] [place] [address] 01/21/2022    EGD BIOPSY performed by Rocky Pierre MD at [address] [place] [address] N/A 04/15/2022    EGD ESOPHAGOGASTRODUODENOSCOPY performed by Rima Bolaños MD at [address] [place] [address] 06/06/2022    EGD BAND LIGATION performed by Tiera Ornelas MD at [address] [place] [address] N/A 06/09/2022    EGD ESOPHAGOGASTRODUODENOSCOPY performed by Tiera Ornelas MD at [address] [place] [address] N/A 9/14/2022    EGD ESOPHAGOGASTRODUODENOSCOPY ENDOSCOPIC APC AT [place] [address] performed by Virgen Leon MD at [address] [place] [address] 10/19/2022    EGD BIOPSY performed by Rocky Pierre MD at [address] [place] [address]          Medications Prior to Admission:       Prior to Admission medications    Medication Sig Start Date End Date Taking? Authorizing Provider   FEROSUL 325 (65 Fe) MG tablet take 1 tablet by mouth twice a day 10/7/22   VASU Callahan   nadolol (CORGARD) 20 MG tablet take 1 tablet by mouth once daily 8/26/22   MASSIMO Rowe NP   midodrine (PROAMATINE) 5 MG tablet Take 2 tablets by mouth in the morning and 2 tablets at noon and 2 tablets before bedtime.  8/4/22   MASSIMO Rowe NP   furosemide (LASIX) 20 MG tablet Take 1 tablet by mouth 2 times daily 6/23/22   MASSIMO Rowe NP spironolactone (ALDACTONE) 100 MG tablet Take 1 tablet by mouth every evening  Patient not taking: No sig reported 22   Cuate Trevino, APRN - NP   ondansetron (ZOFRAN-ODT) 4 MG disintegrating tablet dissolve 1 tablet by mouth every 8 hours if needed for nausea and vomiting 22   VASU [place]   lactulose [facility]) 10 GM/15ML solution take 30 milliliters by mouth three times a day 22   VASU [place]   FLUoxetine (PROZAC) 20 MG capsule Take 1 capsule by mouth daily 22   Jatin Francisco MD   atorvastatin (LIPITOR) 20 MG tablet Take 1 tablet by mouth nightly 22   Jatin Francisco MD        Allergies:       Patient has no known allergies. Social History:     Tobacco:    reports that he quit smoking about 5 years ago. His smoking use included cigarettes. He has a 45.00 pack-year smoking history. He has never used smokeless tobacco.  Alcohol:      reports that he does not currently use alcohol. Drug Use: reports that he does not currently use drugs after having used the following drugs: Cocaine. Frequency: 1.00 time per week. Family History:     Family History   Problem Relation Age of Onset    Cancer Mother         pancreatic    Cancer Father         bone    Diabetes Sister     Asthma Brother        Review of Systems:     Review of Systems reviewed from the chart and no new changes. Also reviewed GI APRN review of systems. All the systems negative. Code Status:  Full Code    Physical Exam:     Vitals:  BP 93/65   Pulse 58   Temp 97.5 °F (36.4 °C) (Oral)   Resp 16   Ht 5' 10\" (1.778 m)   Wt 209 lb 7 oz (95 kg)   SpO2 100% Comment: on Room Air  BMI 30.05 kg/m²   Temp (24hrs), Av.2 °F (36.8 °C), Min:97.5 °F (36.4 °C), Max:98.6 °F (37 °C)      Physical Exam  Vitals and nursing note reviewed. Constitutional:       General: He is not in acute distress. Appearance: He is well-developed. Comments: Signs of malnutrition.    HENT:      Head: Normocephalic and atraumatic. Mouth/Throat:      Pharynx: No oropharyngeal exudate. Eyes:      General: No scleral icterus. Conjunctiva/sclera: Conjunctivae normal.      Pupils: Pupils are equal, round, and reactive to light. Neck:      Thyroid: No thyromegaly. Trachea: No tracheal deviation. Cardiovascular:      Rate and Rhythm: Normal rate and regular rhythm. Heart sounds: Normal heart sounds. No murmur heard. Pulmonary:      Effort: Pulmonary effort is normal. No respiratory distress. Breath sounds: Normal breath sounds. No wheezing or rales. Abdominal:      General: Bowel sounds are normal. There is no distension. Palpations: Abdomen is soft. There is no hepatomegaly or mass. Tenderness: There is no abdominal tenderness. There is no guarding or rebound. Hernia: No hernia is present. Comments: Abdomen is distended with ascites. No acute tenderness. Positive peripheral signs of chronic liver disease. Genitourinary:     Rectum: Normal.   Musculoskeletal:      Cervical back: Normal range of motion and neck supple. Lymphadenopathy:      Cervical: No cervical adenopathy. Skin:     General: Skin is warm and dry. Findings: Bruising present. No erythema or rash. Neurological:      Mental Status: He is alert and oriented to person, place, and time. Cranial Nerves: No cranial nerve deficit. Psychiatric:         Thought Content:  Thought content normal.             Data:   CBC:   Lab Results   Component Value Date/Time    WBC 5.8 10/30/2022 05:55 AM    RBC 3.02 10/30/2022 05:55 AM    RBC 4.75 04/19/2012 11:30 AM    HGB 7.8 10/30/2022 11:43 AM    HCT 24.6 10/30/2022 11:43 AM    MCV 85.4 10/30/2022 05:55 AM    MCH 26.0 10/30/2022 05:55 AM    MCHC 30.4 10/30/2022 05:55 AM    RDW 16.4 10/30/2022 05:55 AM     10/30/2022 05:55 AM     04/19/2012 11:30 AM    MPV 7.2 10/30/2022 05:55 AM     CBC with Differential:  Lab Results   Component Value Date/Time    WBC 5.8 10/30/2022 05:55 AM    RBC 3.02 10/30/2022 05:55 AM    RBC 4.75 04/19/2012 11:30 AM    HGB 7.8 10/30/2022 11:43 AM    HCT 24.6 10/30/2022 11:43 AM     10/30/2022 05:55 AM     04/19/2012 11:30 AM    MCV 85.4 10/30/2022 05:55 AM    MCH 26.0 10/30/2022 05:55 AM    MCHC 30.4 10/30/2022 05:55 AM    RDW 16.4 10/30/2022 05:55 AM    NRBC 1 06/03/2022 06:20 AM    METASPCT 2 10/30/2022 05:55 AM    LYMPHOPCT 3 10/30/2022 05:55 AM    MONOPCT 13 10/30/2022 05:55 AM    MYELOPCT 1 06/01/2021 08:25 AM    BASOPCT 1 10/30/2022 05:55 AM    MONOSABS 0.75 10/30/2022 05:55 AM    LYMPHSABS 0.17 10/30/2022 05:55 AM    EOSABS 0.12 10/30/2022 05:55 AM    BASOSABS 0.06 10/30/2022 05:55 AM    DIFFTYPE NOT REPORTED 02/02/2022 09:35 AM     Hemoglobin/Hematocrit:  Lab Results   Component Value Date/Time    HGB 7.8 10/30/2022 11:43 AM    HCT 24.6 10/30/2022 11:43 AM     CMP:    Lab Results   Component Value Date/Time     10/30/2022 05:55 AM    K 4.4 10/30/2022 05:55 AM    CL 99 10/30/2022 05:55 AM    CO2 19 10/30/2022 05:55 AM    BUN 12 10/30/2022 05:55 AM    CREATININE 0.69 10/30/2022 05:55 AM    GFRAA >60 09/28/2022 01:33 PM    LABGLOM >60 10/30/2022 05:55 AM    GLUCOSE 122 10/30/2022 05:55 AM    GLUCOSE 65 04/19/2012 11:30 AM    PROT 5.3 10/28/2022 04:31 PM    LABALBU 3.6 10/28/2022 04:31 PM    LABALBU 4.7 04/19/2012 11:30 AM    CALCIUM 8.1 10/30/2022 05:55 AM    BILITOT 1.0 10/28/2022 04:31 PM    ALKPHOS 87 10/28/2022 04:31 PM    AST 23 10/28/2022 04:31 PM    ALT 10 10/28/2022 04:31 PM     BMP:  Lab Results   Component Value Date/Time     10/30/2022 05:55 AM    K 4.4 10/30/2022 05:55 AM    CL 99 10/30/2022 05:55 AM    CO2 19 10/30/2022 05:55 AM    BUN 12 10/30/2022 05:55 AM    LABALBU 3.6 10/28/2022 04:31 PM    LABALBU 4.7 04/19/2012 11:30 AM    CREATININE 0.69 10/30/2022 05:55 AM    CALCIUM 8.1 10/30/2022 05:55 AM    GFRAA >60 09/28/2022 01:33 PM    LABGLOM >60 10/30/2022 05:55 AM    GLUCOSE 122 10/30/2022 05:55 AM GLUCOSE 65 04/19/2012 11:30 AM     PT/INR:    Lab Results   Component Value Date/Time    PROTIME 15.3 10/14/2022 01:00 PM    INR 1.2 10/14/2022 01:00 PM     PTT:    Lab Results   Component Value Date/Time    APTT 35.0 10/14/2022 01:00 PM   [APTT}    Assesment:     Primary Problem  GI bleed    Active Hospital Problems    Diagnosis Date Noted    GI bleed [K92.2] 09/13/2022     Priority: Medium       Plan:     Patient has recurrent anemia, melanotic stools. May need repeat EGD and possible cauterization of angio ectasia. EGD was scheduled today, unfortunately patient had breakfast, EGD is canceled. Will plan for the procedure tomorrow. To keep on PPI therapy. Monitor hemoglobin. Thank you for allowing me to participate in the care of your patient. Please feel free to contact me with anyquestions or concerns. Electronically signed by Fina Hong MD on 10/30/2022 at 1:54 PM     Copy sent to VASU Srinivasan    Note is dictated utilizing voice recognition software. Unfortunately this leads to occasional typographical errors. Please contact our office if you have any questions.

## 2022-10-30 NOTE — PLAN OF CARE
Problem: Discharge Planning  Goal: Discharge to home or other facility with appropriate resources  10/30/2022 1821 by Catie Eng RN  Outcome: Progressing  Flowsheets (Taken 10/30/2022 1821)  Discharge to home or other facility with appropriate resources:   Identify barriers to discharge with patient and caregiver   Refer to discharge planning if patient needs post-hospital services based on physician order or complex needs related to functional status, cognitive ability or social support system     Problem: Safety - Adult  Goal: Free from fall injury  10/30/2022 1821 by Catie Eng RN  Outcome: Progressing  Flowsheets (Taken 10/30/2022 1821)  Free From Fall Injury:   Instruct family/caregiver on patient safety   Based on caregiver fall risk screen, instruct family/caregiver to ask for assistance with transferring infant if caregiver noted to have fall risk factors     Problem: Cardiovascular - Adult  Goal: Maintains optimal cardiac output and hemodynamic stability  Outcome: Progressing     Problem: Hematologic - Adult  Goal: Maintains hematologic stability  Outcome: Progressing  Flowsheets (Taken 10/30/2022 1821)  Maintains hematologic stability:   Assess for signs and symptoms of bleeding or hemorrhage   Monitor labs for bleeding or clotting disorders   Administer blood products/factors as ordered

## 2022-10-30 NOTE — CARE COORDINATION
CASE MANAGEMENT NOTE:    Admission Date:  10/29/2022 Talisha Rosa is a 60 y.o.  male    Admitted for : GI bleed [K92.2]  Symptomatic anemia [D64.9]    Met with:  Patient    PCP:  Srinivasan Donato                                Insurance:  Richard Layton      Is patient alert and oriented at time of discussion:  Yes    Current Residence/ Living Arrangements:  independently at home alone and drives             Current Services PTA:  Weekly Paracentesis on Friday's at [facility] and [facility]    Does patient go to outpatient dialysis: No  If yes, location and chair time: n/a  Who is their nephrologist? N/a    Is patient agreeable to VNS: Yes    Freedom of choice provided:  Yes    List of [address] provided: No, already current with [facility] and satisfied with their care. [facility] chosen:  [facility]. Referral faxed to notify of admission. DME:  straight cane, walker, and other grab bars    Home Oxygen: No    Nebulizer: No    CPAP/BIPAP: No    Supplier: N/A    Potential Assistance Needed: No    SNF needed: No    Freedom of choice and list provided: NA    Pharmacy:  [facility]       Is patient currently receiving oral anticoagulation therapy? No    Is the Patient an [facility] with Readmission Risk Score greater than 14%? No  If yes, pt needs a follow up appointment made within 7 days. Family Members/Caregivers that pt would like involved in their care:    Yes    If yes, list name here:  [address]    Transportation Provider:  Patient and Family             Discharge Plan:  home without needs.                  Electronically signed by: Elias Eric RN on 10/30/2022 at 2:42 PM

## 2022-10-30 NOTE — PROGRESS NOTES
10/30/22 1520   Encounter Summary   Encounter Overview/Reason  Palliative Care; Attempted Encounter  (Pt was sleeping)   Service Provided For: Patient not available   Referral/Consult From: Rounding   Assessment/Intervention/Outcome   Assessment Unable to assess  (Patient was sleeping)   Intervention Prayer (assurance of)/[place]

## 2022-10-30 NOTE — PROGRESS NOTES
[address]    PROGRESS NOTE             10/30/2022    7:42 AM    Name:   Daniela Bernal  MRN:     [id number]     Acct:      [de-identified]   Room:   2115/2115-01   Day:  1  Admit Date:  10/29/2022 10:53 AM    PCP:  VASU Haney  Code Status:  Full Code    Subjective:     C/C:   Chief Complaint   Patient presents with    Abnormal Lab     HGB 5.1     Interval History Status: improved. Patient seen and examined at bedside  Patient hemoglobin today was 7.8 status post 3 PRBC transfusion. Patient has no new complaints, however, overnight he reports that he was having nausea and headache. Patient also was hypotensive blood pressure down to 79/44, currently 99/68. Status post albumin transfusion. Please keep patient n.p.o. Pending GI evaluation. Nursing notes seen and appreciated    Brief History:     The patient is a 60 y.o.   male with hx of esophageal AC s/p chemoradiation, Hep C, alcoholic liver cirrhosis with portal hypertension and variceal bleeding, with multiple transfusions, who presents withAbnormal Lab (HGB 5.1) after he was told so by Dr. Pattie Steel his Gastroenterologist and is admitted for the management of acute blood loss anemia     Patient reports that he gets paracentesis for ascites once every week, and hemoglobin levels once a week as well. Per patient and notes, patient had a hemoglobin level check on Friday 10/21/2022 which was 6.4 and therefore he was transfused a unit of blood. He was told to check his CBC on Saturday, and was found today to have a hemoglobin of 5.1. Patient was advised to go to the ED immediately for admission and further management. Patient had his last paracentesis yesterday. Patient reports similar previous episodes, however, he said that this time he did not have any abdominal pain preceding the episode, however he reports dark tarry stools.   Patient has a history of esophageal varices, that were previously banded. He also reports that on 90/14 he had some areas cauterized     Patient last endoscopy was 1 week ago which showed small esophageal varices, and an ulcer at the GE junction from the previous variceal banding. Patient also has sliding hiatal hernia, angiectasia and portal hypertensive gastropathy was also appreciated. On September, patient had an EGD, which showed, Multiple angiectasias in gastric fundus and esophagogastric junction presumably related to prior radiation. During admission, paracentesis hemoglobin level of and 5.6. Patient had a soft blood pressure 109/57 followed by 91/55, not tachycardic. Patient was immediately transfused 1 unit hemoglobin, and another unit was also pending. Patient started on octreotide infusion, pantoprazole infusion, as well as ceftriaxone IV as a prophylactic measure for underlying risk of SBP. CT chest abdomen pelvis with IV contrast is planned for 11/03/2022 for follow-up on esophageal adenocarcinoma. Review of Systems:     Review of Systems   Constitutional:  Negative for activity change, appetite change, chills, diaphoresis and fatigue. HENT:  Negative for congestion, ear pain, nosebleeds, sinus pressure, sinus pain and sore throat. Eyes:  Negative for photophobia and visual disturbance. Respiratory:  Negative for apnea, cough, choking, chest tightness, shortness of breath and wheezing. Cardiovascular:  Negative for chest pain, palpitations and leg swelling. Gastrointestinal:  Positive for abdominal distention and blood in stool. Negative for abdominal pain, constipation, diarrhea, nausea and vomiting. Genitourinary:  Negative for difficulty urinating, dysuria and enuresis. Neurological:  Positive for light-headedness. Negative for dizziness, facial asymmetry, weakness, numbness and headaches. Psychiatric/Behavioral:  Negative for agitation, behavioral problems and confusion. Medications:      Allergies:  No Known Allergies    Current Meds:   Scheduled Meds:    midodrine  10 mg Oral 4x daily    albumin human  25 g IntraVENous Q8H    [START ON 10/31/2022] ferrous sulfate  325 mg Oral Every Other Day    sodium chloride flush  5-40 mL IntraVENous 2 times per day    [START ON 11/1/2022] pantoprazole (PROTONIX) 40 mg injection  40 mg IntraVENous Daily    cefTRIAXone (ROCEPHIN) IV  1,000 mg IntraVENous Q24H    atorvastatin  20 mg Oral Nightly    lactulose  30 g Oral TID    [Held by provider] nadolol  20 mg Oral Daily    [Held by provider] furosemide  20 mg Oral BID     Continuous Infusions:    sodium chloride      sodium chloride      sodium chloride      sodium chloride 20 mL/hr at 10/29/22 2205    pantoprazole 8 mg/hr (10/30/22 0219)    octreotide (SandoSTATIN) infusion 50 mcg/hr (10/30/22 0621)     PRN Meds: sodium chloride, sodium chloride, midodrine, sodium chloride, sodium chloride flush, sodium chloride, ondansetron **OR** ondansetron, polyethylene glycol, acetaminophen **OR** acetaminophen    Data:     Past Medical History:   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 ([facility] Alcoholic cirrhosis of liver with ascites ([facility] Anemia, Anxiety, Arthritis, Back pain, chronic, [place] esophagus, BPH (benign prostatic hypertrophy), Cholelithiasis, Cirrhosis ([facility] COVID-19, COVID-19 vaccine series completed, DDD (degenerative disc disease), lumbar, Depression, Esophageal cancer ([facility] Esophageal varices ([facility] Fatty liver, GERD (gastroesophageal reflux disease), Hep C w/o coma, chronic ([facility] History of alcohol abuse, History of blood transfusion, History of colon polyps, History of tobacco abuse, [place] (hard of hearing), Hyperlipidemia, Hypertension, Hyponatremia, Port-A-Cath in place, Portal hypertension ([facility] Sciatica, Secondary esophageal varices ([facility] Shortness of breath, Spinal stenosis, Stomach ulcer, Tubular adenoma of colon, Vitamin D deficiency, and Wears glasses. Social History:   reports that he quit smoking about 5 years ago. His smoking use included cigarettes. He has a 45.00 pack-year smoking history. He has never used smokeless tobacco. He reports that he does not currently use alcohol. He reports that he does not currently use drugs after having used the following drugs: Cocaine. Frequency: 1.00 time per week. Family History:   Family History   Problem Relation Age of Onset    Cancer Mother         pancreatic    Cancer Father         bone    Diabetes Sister     Asthma Brother        Vitals:  BP (!) 82/44   Pulse 54   Temp 98 °F (36.7 °C) (Oral)   Resp 16   Ht 5' 10\" (1.778 m)   Wt 209 lb 7 oz (95 kg)   SpO2 97%   BMI 30.05 kg/m²   Temp (24hrs), Av.2 °F (36.8 °C), Min:98 °F (36.7 °C), Max:98.6 °F (37 °C)    No results for input(s): POCGLU in the last 72 hours. I/O(24Hr):     Intake/Output Summary (Last 24 hours) at 10/30/2022 0742  Last data filed at 10/30/2022 0529  Gross per 24 hour   Intake 2236.26 ml   Output --   Net 2236.26 ml       Labs:    CBC:   Lab Results   Component Value Date/Time    WBC 5.8 10/30/2022 05:55 AM    RBC 3.02 10/30/2022 05:55 AM    RBC 4.75 2012 11:30 AM    HGB 7.8 10/30/2022 05:55 AM    HCT 25.8 10/30/2022 05:55 AM    MCV 85.4 10/30/2022 05:55 AM    MCH 26.0 10/30/2022 05:55 AM    MCHC 30.4 10/30/2022 05:55 AM    RDW 16.4 10/30/2022 05:55 AM     10/30/2022 05:55 AM     2012 11:30 AM    MPV 7.2 10/30/2022 05:55 AM       Lab Results   Component Value Date/Time    SPECIAL NOT REPORTED 2022 11:28 AM     Lab Results   Component Value Date/Time    CULTURE NO SIGNIFICANT GROWTH 2022 11:00 PM         Radiology:    IR US GUIDED PARACENTESIS    Result Date: 10/28/2022  PROCEDURE: PARACENTESIS WITH IMAGE GUIDANCE US ABDOMEN LIMITED 10/28/2022 HISTORY: ORDERING SYSTEM PROVIDED HISTORY: Alcoholic cirrhosis of liver with ascites ([facility] TECHNOLOGIST PROVIDED HISTORY: Weekly due o amount drained every 2 weeks TECHNIQUE: Informed consent was obtained after a detailed explanation of the procedure including risks, benefits, and alternatives. Universal protocol was followed. A limited ultrasound of the abdomen was performed and demonstrates a large amount of ascites. The right abdomen was prepped and draped in sterile fashion and local anesthesia was achieved with lidocaine. A 5 Pashto needle sheath was advanced into ascites using realtime ultrasound guidance and paracentesis was performed. The patient tolerated the procedure well. EBL: None FINDINGS: Limited ultrasound of the abdomen demonstrates ascites. A total of 8.1 L of slightly turbid yellow colored fluid was removed. Supplemental albumin was given intravenously     Successful ultrasound-guided paracentesis. IR US GUIDED PARACENTESIS    Result Date: 10/21/2022  PROCEDURE: PARACENTESIS WITH IMAGE GUIDANCE US ABDOMEN LIMITED 10/21/2022 HISTORY: ORDERING SYSTEM PROVIDED HISTORY: Alcoholic cirrhosis of liver with ascites ([facility] TECHNOLOGIST PROVIDED HISTORY: Weekly due o amount drained every 2 weeks TECHNIQUE: Informed consent was obtained after a detailed explanation of the procedure including risks, benefits, and alternatives. Universal protocol was followed. A limited ultrasound of the abdomen was performed and shows large amount of ascites. The left abdomen was prepped and draped in sterile fashion and local anesthesia was achieved with lidocaine. A 5 Pashto needle sheath was advanced into ascites using realtime ultrasound guidance and paracentesis was performed. The patient tolerated the procedure well. EBL: None FINDINGS: Limited ultrasound of the abdomen demonstrates ascites. A total of 7.9 L of clear yellow fluid was removed. Supplemental albumin was given intravenously. Successful ultrasound-guided paracentesis.      IR US GUIDED PARACENTESIS    Result Date: 10/14/2022  PROCEDURE: PARACENTESIS WITH IMAGE GUIDANCE US ABDOMEN LIMITED 10/14/2022 HISTORY: ORDERING SYSTEM PROVIDED HISTORY: Alcoholic cirrhosis of liver with ascites ([facility] TECHNOLOGIST PROVIDED HISTORY: Weekly due o amount drained every 2 weeks TECHNIQUE: Informed consent was obtained after a detailed explanation of the procedure including risks, benefits, and alternatives. Universal protocol was followed. A limited ultrasound of the abdomen was performed. The right abdomen was prepped and draped in sterile fashion and local anesthesia was achieved with lidocaine. An 5 Citizen of the [place] needle sheath was advanced into ascites and paracentesis was performed. The patient tolerated the procedure well. FINDINGS: Limited ultrasound of the abdomen demonstrates ascites. A total of 9.7 L was removed. Successful ultrasound-guided therapeutic paracentesis. IR US GUIDED PARACENTESIS    Result Date: 10/7/2022  PROCEDURE: PARACENTESIS WITHOUT IMAGE GUIDANCE US ABDOMEN LIMITED 10/7/2022 HISTORY: ORDERING SYSTEM PROVIDED HISTORY: Alcoholic cirrhosis of liver with ascites ([facility] [address] [facility] TECHNOLOGIST PROVIDED HISTORY: Weekly due o amount drained every 2 weeks TECHNIQUE: Informed consent was obtained after a explanation of the procedure including risks. Universal protocol was followed. A limited ultrasound of the abdomen was performed. The right abdomen was prepped and draped in sterile fashion, and local anesthesia was achieved with 1% lidocaine. A 5 Citizen of the [place] needle sheath was advanced into the ascites under direct ultrasound guidance (a sterile probe cover and gel were used), and paracentesis was performed. A total of 10.6 L of yellow fluid was aspirated. IV albumin therapy was initiated. The patient tolerated the procedure well without immediately apparent complication. FINDINGS: Initial limited abdominal ultrasound demonstrated a large amount of ascites.      Successful paracentesis     IR US GUIDED PARACENTESIS    Result Date: 9/30/2022  EXAMINATION: IR US GUIDED PARACENTESIS W IMAGING HISTORY / PRE-OPERATIVE DIAGNOSIS: ORDERING SYSTEM PROVIDED HISTORY: Alcoholic cirrhosis of liver with ascites ([address] TECHNOLOGIST PROVIDED HISTORY: Weekly due o amount drained every 2 weeks : Stevie Bynum ANESTHESIA: Local TECHNIQUE: After obtaining informed consent, the patient was placed in the supine position in bed. The right lateral flank area was cleansed and draped in the usual sterile fashion. A time-out was performed prior to commencing the procedure. 1% Lidocaine was used for local anesthesia. Next, a 5 [place] Yueh needle catheter  was introduced into the ascites fluid collection. As much fluid was removed as possible by vacuum container bottles. A total of 10.7 L of cloudy light stephanie colored fluid was removed. A total of 1300 cc of the cloudy light stephanie colored fluid was sent to the lab for evaluation. 75 g IV albumin was given. COMPARISON: Paracentesis dated 09/23/2022 FINDINGS: Technically successful ultrasound guided large volume paracentesis ESTIMATED BLOOD LOSS: Less than 50 SPECIMENS: A total of 10.7 L of cloudy light stephanie colored fluid was obtained. Technically successful US guided large volume paracentesis with removal of 10.7 L of fluid as described above. The patient received 75 grams of Albumin IV during and post procedure. COMPLICATIONS: The patient tolerated the procedure well without any immediate complications. Physical Examination:        Physical Exam  Vitals and nursing note reviewed. Constitutional:       General: He is not in acute distress. Appearance: He is ill-appearing. He is not toxic-appearing. HENT:      Head: Normocephalic. Eyes:      Extraocular Movements: Extraocular movements intact. Pupils: Pupils are equal, round, and reactive to light. Cardiovascular:      Rate and Rhythm: Normal rate and regular rhythm. Pulses: Normal pulses. Heart sounds: Normal heart sounds.    Pulmonary:      Effort: Pulmonary effort is normal.      Breath sounds: Normal breath sounds. Abdominal:      General: Bowel sounds are normal. There is distension. Palpations: Abdomen is soft. There is shifting dullness and fluid wave. Musculoskeletal:      Cervical back: Normal range of motion and neck supple. Skin:     General: Skin is warm. Coloration: Skin is pale. Neurological:      General: No focal deficit present. Mental Status: He is alert and oriented to person, place, and time. Mental status is at baseline. Psychiatric:         Mood and Affect: Mood normal.         Behavior: Behavior normal.         Assessment:        Primary Problem  GI bleed    Active Hospital Problems    Diagnosis Date Noted    GI bleed [K92.2] 09/13/2022     Priority: Medium       Plan:        Acute anemia most likely secondary to upper GI blood loss in the setting of varices and telangiectasias  -GI consulted, Dr. Darian Austin. Possible EGD   Patient received 3 packed RBC, H&H every 6 hours  Patient on octreotide infusion, PPI infusion. Continue nadolol, continue midodrine. .  Please keep close eye on vitals  Patient n.p.o. Liver cirrhosis secondary to chronic alcoholism  Continue patient on furosemide 20 mg twice daily  Continue lactulose 30 mg 3 times daily  Continue nadolol, continue midodrine      Hyperlipidemia   Continue atorvastatin        Anthony Urias MD  10/30/2022  7:42 AM       I have discussed the care of Heavenly Lemus , including pertinent history and exam findings,    today with the resident. I have seen and examined the patient and the key elements of all parts of the encounter have been performed by me . I agree with the assessment, plan and orders as documented by the resident. Principal Problem:    GI bleed  Resolved Problems:    * No resolved hospital problems. *        Overall  course ;                                   are improving over time.         Patient, clinically doing better  Hemoglobin stable  No more melena  Will have EGD tomorrow, likely good candidate for TIPS  Discussed case with GI physician          Electronically signed by Rodrigo Haji MD

## 2022-10-30 NOTE — PLAN OF CARE
PRE-CONSULT ROUNDING NOTE    HPI    69-year-old male sent to ED for abnormal labs. Hgb 5.1 on Friday. Patient well known to our service. Patient has hx of  hepatitis C, cirrhosis, EtOH abuse, esophageal cancer, esophageal varices, HTN. Last EGD 10/19/22. Small varices noted. Has friable mucosa from GE junction towards fundus. PHG. Has been having recurrent ascites. Has paracentesis every Friday. Last para 10/28 with 8 L removed. Reports had a dark brown BM the other day but denies any tarry black stools. No nausea, vomiting. Tolerating diet. Does not appear to follow a 2 gm sodium diet. On lasix and aldactone. No BLE edema  No encephalopathy    Does endorse weakness, fatigue, mild dizziness with changes in position. On Midodrine  Was given Albumin today    Review of Systems   Constitutional:  Positive for fatigue. Negative for appetite change and fever. HENT:  Negative for mouth sores, sore throat, trouble swallowing and voice change. Eyes:         No ictirus   Respiratory:  Negative for apnea, cough, choking, shortness of breath and wheezing. Cardiovascular:  Negative for chest pain, palpitations and leg swelling. Gastrointestinal:  Positive for abdominal distention. Negative for abdominal pain, blood in stool, constipation, diarrhea, nausea, rectal pain and vomiting. Endocrine: Negative for polydipsia, polyphagia and polyuria. Genitourinary:  Negative for frequency, hematuria and urgency. Musculoskeletal:  Negative for arthralgias, back pain, gait problem and joint swelling. Skin:  Negative for pallor and rash. Allergic/Immunologic: Negative for food allergies. Neurological:  Positive for dizziness and weakness. Negative for seizures and headaches. Hematological:  Negative for adenopathy. Does not bruise/bleed easily. Psychiatric/Behavioral:  Negative for sleep disturbance. The patient is not nervous/anxious. Physical Exam  Vitals and nursing note reviewed. Constitutional:       General: He is not in acute distress. Appearance: He is well-developed. HENT:      Head: Normocephalic and atraumatic. Mouth/Throat:      Pharynx: No oropharyngeal exudate. Eyes:      General: No scleral icterus. Conjunctiva/sclera: Conjunctivae normal.      Pupils: Pupils are equal, round, and reactive to light. Neck:      Thyroid: No thyromegaly. Trachea: No tracheal deviation. Cardiovascular:      Rate and Rhythm: Normal rate and regular rhythm. Heart sounds: Normal heart sounds. No murmur heard. Pulmonary:      Effort: Pulmonary effort is normal. No respiratory distress. Breath sounds: Normal breath sounds. No wheezing or rales. Abdominal:      General: Bowel sounds are normal. There is no distension. Palpations: Abdomen is soft. There is no hepatomegaly or mass. Tenderness: There is no abdominal tenderness. There is no guarding or rebound. Hernia: No hernia is present. Genitourinary:     Rectum: Normal.   Musculoskeletal:      Cervical back: Normal range of motion and neck supple. Lymphadenopathy:      Cervical: No cervical adenopathy. Skin:     General: Skin is warm and dry. Findings: No erythema or rash. Neurological:      Mental Status: He is alert and oriented to person, place, and time. Cranial Nerves: No cranial nerve deficit. Psychiatric:         Thought Content:  Thought content normal.       ASSESSMENT/PLAN    Anemia  Cirrhosis  Hx EtOH abuse  Hx esophageal cancer  Hx varices, PHG  Recurrent ascites    -2 gm sodium diet today  -Diuretics  -PPI  -Monitor for bleeding  -Trend H&H  -Transufse for hgb <7  -EGD tomorrow  -NPO after midnight  -May require TIPS given recurrent abdominal ascites, PHG

## 2022-10-31 ENCOUNTER — ANESTHESIA (OUTPATIENT)
Dept: ENDOSCOPY | Age: 62
DRG: 432 | End: 2022-10-31
Payer: MEDICARE

## 2022-10-31 ENCOUNTER — ANESTHESIA EVENT (OUTPATIENT)
Dept: ENDOSCOPY | Age: 62
DRG: 432 | End: 2022-10-31
Payer: MEDICARE

## 2022-10-31 PROBLEM — Z51.5 PALLIATIVE CARE ENCOUNTER: Status: ACTIVE | Noted: 2022-10-31

## 2022-10-31 PROBLEM — Z71.89 DNR (DO NOT RESUSCITATE) DISCUSSION: Status: ACTIVE | Noted: 2022-10-31

## 2022-10-31 PROBLEM — Z71.89 GOALS OF CARE, COUNSELING/DISCUSSION: Status: ACTIVE | Noted: 2022-10-31

## 2022-10-31 PROBLEM — Z71.89 ACP (ADVANCE CARE PLANNING): Status: ACTIVE | Noted: 2022-10-31

## 2022-10-31 LAB
A1 LECTIN: NEGATIVE
ABO/RH: NORMAL
ABSOLUTE EOS #: 0.1 K/UL (ref 0–0.4)
ABSOLUTE LYMPH #: 0.5 K/UL (ref 1–4.8)
ABSOLUTE MONO #: 1.1 K/UL (ref 0.1–1.3)
ANION GAP SERPL CALCULATED.3IONS-SCNC: 8 MMOL/L (ref 9–17)
ANTIBODY SCREEN: NEGATIVE
ARM BAND NUMBER: NORMAL
BASOPHILS # BLD: 1 % (ref 0–2)
BASOPHILS ABSOLUTE: 0.1 K/UL (ref 0–0.2)
BLD PROD TYP BPU: NORMAL
BLOOD BANK BLOOD PRODUCT EXPIRATION DATE: NORMAL
BLOOD BANK ISBT PRODUCT BLOOD TYPE: 5100
BLOOD BANK PRODUCT CODE: NORMAL
BLOOD BANK UNIT TYPE AND RH: NORMAL
BPU ID: NORMAL
BUN BLDV-MCNC: 15 MG/DL (ref 8–23)
CALCIUM SERPL-MCNC: 8.2 MG/DL (ref 8.6–10.4)
CHLORIDE BLD-SCNC: 102 MMOL/L (ref 98–107)
CO2: 21 MMOL/L (ref 20–31)
CREAT SERPL-MCNC: 0.7 MG/DL (ref 0.7–1.2)
CROSSMATCH RESULT: NORMAL
DISPENSE STATUS BLOOD BANK: NORMAL
EOSINOPHILS RELATIVE PERCENT: 2 % (ref 0–4)
EXPIRATION DATE: NORMAL
GFR SERPL CREATININE-BSD FRML MDRD: >60 ML/MIN/1.73M2
GLUCOSE BLD-MCNC: 140 MG/DL (ref 70–99)
HCT VFR BLD CALC: 26.2 % (ref 41–53)
HCT VFR BLD CALC: 28.1 % (ref 41–53)
HCT VFR BLD CALC: 29 % (ref 41–53)
HCT VFR BLD CALC: 29.4 % (ref 41–53)
HCT VFR BLD CALC: 31 % (ref 41–53)
HEMOGLOBIN: 8 G/DL (ref 13.5–17.5)
HEMOGLOBIN: 8.7 G/DL (ref 13.5–17.5)
HEMOGLOBIN: 8.9 G/DL (ref 13.5–17.5)
HEMOGLOBIN: 9.2 G/DL (ref 13.5–17.5)
HEMOGLOBIN: 9.4 G/DL (ref 13.5–17.5)
LYMPHOCYTES # BLD: 7 % (ref 24–44)
MCH RBC QN AUTO: 26.7 PG (ref 26–34)
MCHC RBC AUTO-ENTMCNC: 31 G/DL (ref 31–37)
MCV RBC AUTO: 86.1 FL (ref 80–100)
MONOCYTES # BLD: 13 % (ref 1–7)
PDW BLD-RTO: 16.1 % (ref 11.5–14.9)
PLATELET # BLD: 161 K/UL (ref 150–450)
PMV BLD AUTO: 7.4 FL (ref 6–12)
POTASSIUM SERPL-SCNC: 4.1 MMOL/L (ref 3.7–5.3)
RBC # BLD: 3.27 M/UL (ref 4.5–5.9)
SEG NEUTROPHILS: 77 % (ref 36–66)
SEGMENTED NEUTROPHILS ABSOLUTE COUNT: 6.2 K/UL (ref 1.3–9.1)
SODIUM BLD-SCNC: 131 MMOL/L (ref 135–144)
TRANSFUSION STATUS: NORMAL
UNIT DIVISION: 0
UNIT ISSUE DATE/TIME: NORMAL
WBC # BLD: 8 K/UL (ref 3.5–11)

## 2022-10-31 PROCEDURE — 2580000003 HC RX 258

## 2022-10-31 PROCEDURE — 6360000002 HC RX W HCPCS: Performed by: INTERNAL MEDICINE

## 2022-10-31 PROCEDURE — 7100000000 HC PACU RECOVERY - FIRST 15 MIN: Performed by: INTERNAL MEDICINE

## 2022-10-31 PROCEDURE — 99233 SBSQ HOSP IP/OBS HIGH 50: CPT | Performed by: INTERNAL MEDICINE

## 2022-10-31 PROCEDURE — 2580000003 HC RX 258: Performed by: INTERNAL MEDICINE

## 2022-10-31 PROCEDURE — 6360000002 HC RX W HCPCS

## 2022-10-31 PROCEDURE — 3700000001 HC ADD 15 MINUTES (ANESTHESIA): Performed by: INTERNAL MEDICINE

## 2022-10-31 PROCEDURE — 6370000000 HC RX 637 (ALT 250 FOR IP): Performed by: INTERNAL MEDICINE

## 2022-10-31 PROCEDURE — 6360000002 HC RX W HCPCS: Performed by: NURSE ANESTHETIST, CERTIFIED REGISTERED

## 2022-10-31 PROCEDURE — 2500000003 HC RX 250 WO HCPCS: Performed by: NURSE ANESTHETIST, CERTIFIED REGISTERED

## 2022-10-31 PROCEDURE — 2709999900 HC NON-CHARGEABLE SUPPLY: Performed by: INTERNAL MEDICINE

## 2022-10-31 PROCEDURE — 36415 COLL VENOUS BLD VENIPUNCTURE: CPT

## 2022-10-31 PROCEDURE — 3609013000 HC EGD TRANSORAL CONTROL BLEEDING ANY METHOD: Performed by: INTERNAL MEDICINE

## 2022-10-31 PROCEDURE — 2720000010 HC SURG SUPPLY STERILE: Performed by: INTERNAL MEDICINE

## 2022-10-31 PROCEDURE — 3700000000 HC ANESTHESIA ATTENDED CARE: Performed by: INTERNAL MEDICINE

## 2022-10-31 PROCEDURE — C9113 INJ PANTOPRAZOLE SODIUM, VIA: HCPCS | Performed by: INTERNAL MEDICINE

## 2022-10-31 PROCEDURE — 2060000000 HC ICU INTERMEDIATE R&B

## 2022-10-31 PROCEDURE — 99222 1ST HOSP IP/OBS MODERATE 55: CPT | Performed by: NURSE PRACTITIONER

## 2022-10-31 PROCEDURE — 2580000003 HC RX 258: Performed by: ANESTHESIOLOGY

## 2022-10-31 PROCEDURE — C9113 INJ PANTOPRAZOLE SODIUM, VIA: HCPCS

## 2022-10-31 PROCEDURE — 85025 COMPLETE CBC W/AUTO DIFF WBC: CPT

## 2022-10-31 PROCEDURE — 7100000001 HC PACU RECOVERY - ADDTL 15 MIN: Performed by: INTERNAL MEDICINE

## 2022-10-31 PROCEDURE — 6370000000 HC RX 637 (ALT 250 FOR IP): Performed by: STUDENT IN AN ORGANIZED HEALTH CARE EDUCATION/TRAINING PROGRAM

## 2022-10-31 PROCEDURE — 85018 HEMOGLOBIN: CPT

## 2022-10-31 PROCEDURE — 85014 HEMATOCRIT: CPT

## 2022-10-31 PROCEDURE — 6370000000 HC RX 637 (ALT 250 FOR IP)

## 2022-10-31 PROCEDURE — 80048 BASIC METABOLIC PNL TOTAL CA: CPT

## 2022-10-31 PROCEDURE — 0W3P8ZZ CONTROL BLEEDING IN GASTROINTESTINAL TRACT, VIA NATURAL OR ARTIFICIAL OPENING ENDOSCOPIC: ICD-10-PCS | Performed by: INTERNAL MEDICINE

## 2022-10-31 PROCEDURE — 43255 EGD CONTROL BLEEDING ANY: CPT | Performed by: INTERNAL MEDICINE

## 2022-10-31 RX ORDER — DIPHENHYDRAMINE HYDROCHLORIDE 50 MG/ML
12.5 INJECTION INTRAMUSCULAR; INTRAVENOUS
Status: DISCONTINUED | OUTPATIENT
Start: 2022-10-31 | End: 2022-10-31 | Stop reason: HOSPADM

## 2022-10-31 RX ORDER — SODIUM CHLORIDE 0.9 % (FLUSH) 0.9 %
5-40 SYRINGE (ML) INJECTION PRN
Status: DISCONTINUED | OUTPATIENT
Start: 2022-10-31 | End: 2022-10-31 | Stop reason: HOSPADM

## 2022-10-31 RX ORDER — SODIUM CHLORIDE 9 MG/ML
INJECTION, SOLUTION INTRAVENOUS PRN
Status: DISCONTINUED | OUTPATIENT
Start: 2022-10-31 | End: 2022-10-31 | Stop reason: HOSPADM

## 2022-10-31 RX ORDER — SODIUM CHLORIDE 9 MG/ML
INJECTION, SOLUTION INTRAVENOUS CONTINUOUS
Status: DISCONTINUED | OUTPATIENT
Start: 2022-10-31 | End: 2022-10-31 | Stop reason: HOSPADM

## 2022-10-31 RX ORDER — MEPERIDINE HYDROCHLORIDE 25 MG/ML
12.5 INJECTION INTRAMUSCULAR; INTRAVENOUS; SUBCUTANEOUS EVERY 5 MIN PRN
Status: DISCONTINUED | OUTPATIENT
Start: 2022-10-31 | End: 2022-10-31 | Stop reason: HOSPADM

## 2022-10-31 RX ORDER — ONDANSETRON 2 MG/ML
4 INJECTION INTRAMUSCULAR; INTRAVENOUS
Status: DISCONTINUED | OUTPATIENT
Start: 2022-10-31 | End: 2022-10-31 | Stop reason: HOSPADM

## 2022-10-31 RX ORDER — HYDRALAZINE HYDROCHLORIDE 20 MG/ML
10 INJECTION INTRAMUSCULAR; INTRAVENOUS
Status: DISCONTINUED | OUTPATIENT
Start: 2022-10-31 | End: 2022-10-31 | Stop reason: HOSPADM

## 2022-10-31 RX ORDER — SPIRONOLACTONE 25 MG/1
25 TABLET ORAL DAILY
Status: DISCONTINUED | OUTPATIENT
Start: 2022-10-31 | End: 2022-11-01 | Stop reason: HOSPADM

## 2022-10-31 RX ORDER — PROPOFOL 10 MG/ML
INJECTION, EMULSION INTRAVENOUS CONTINUOUS PRN
Status: DISCONTINUED | OUTPATIENT
Start: 2022-10-31 | End: 2022-10-31 | Stop reason: SDUPTHER

## 2022-10-31 RX ORDER — SODIUM CHLORIDE 0.9 % (FLUSH) 0.9 %
5-40 SYRINGE (ML) INJECTION EVERY 12 HOURS SCHEDULED
Status: DISCONTINUED | OUTPATIENT
Start: 2022-10-31 | End: 2022-10-31 | Stop reason: HOSPADM

## 2022-10-31 RX ORDER — FENTANYL CITRATE 50 UG/ML
25 INJECTION, SOLUTION INTRAMUSCULAR; INTRAVENOUS EVERY 5 MIN PRN
Status: DISCONTINUED | OUTPATIENT
Start: 2022-10-31 | End: 2022-10-31 | Stop reason: HOSPADM

## 2022-10-31 RX ORDER — LABETALOL HYDROCHLORIDE 5 MG/ML
10 INJECTION, SOLUTION INTRAVENOUS
Status: DISCONTINUED | OUTPATIENT
Start: 2022-10-31 | End: 2022-10-31 | Stop reason: HOSPADM

## 2022-10-31 RX ORDER — LIDOCAINE HYDROCHLORIDE 20 MG/ML
INJECTION, SOLUTION EPIDURAL; INFILTRATION; INTRACAUDAL; PERINEURAL PRN
Status: DISCONTINUED | OUTPATIENT
Start: 2022-10-31 | End: 2022-10-31 | Stop reason: SDUPTHER

## 2022-10-31 RX ORDER — METOCLOPRAMIDE HYDROCHLORIDE 5 MG/ML
10 INJECTION INTRAMUSCULAR; INTRAVENOUS
Status: DISCONTINUED | OUTPATIENT
Start: 2022-10-31 | End: 2022-10-31 | Stop reason: HOSPADM

## 2022-10-31 RX ORDER — ACETAMINOPHEN 325 MG/1
650 TABLET ORAL
Status: DISCONTINUED | OUTPATIENT
Start: 2022-10-31 | End: 2022-10-31 | Stop reason: HOSPADM

## 2022-10-31 RX ADMIN — SODIUM CHLORIDE: 9 INJECTION, SOLUTION INTRAVENOUS at 08:14

## 2022-10-31 RX ADMIN — OCTREOTIDE ACETATE 50 MCG/HR: 1000 INJECTION, SOLUTION INTRAVENOUS; SUBCUTANEOUS at 00:19

## 2022-10-31 RX ADMIN — FUROSEMIDE 20 MG: 20 TABLET ORAL at 22:11

## 2022-10-31 RX ADMIN — CEFTRIAXONE SODIUM 1000 MG: 1 INJECTION, POWDER, FOR SOLUTION INTRAMUSCULAR; INTRAVENOUS at 16:21

## 2022-10-31 RX ADMIN — LIDOCAINE HYDROCHLORIDE 80 MG: 20 INJECTION, SOLUTION EPIDURAL; INFILTRATION; INTRACAUDAL; PERINEURAL at 09:13

## 2022-10-31 RX ADMIN — ATORVASTATIN CALCIUM 20 MG: 20 TABLET, FILM COATED ORAL at 22:11

## 2022-10-31 RX ADMIN — SODIUM CHLORIDE 8 MG/HR: 9 INJECTION, SOLUTION INTRAVENOUS at 00:22

## 2022-10-31 RX ADMIN — MIDODRINE HYDROCHLORIDE 10 MG: 10 TABLET ORAL at 22:11

## 2022-10-31 RX ADMIN — SPIRONOLACTONE 25 MG: 25 TABLET ORAL at 16:34

## 2022-10-31 RX ADMIN — SODIUM CHLORIDE 8 MG/HR: 9 INJECTION, SOLUTION INTRAVENOUS at 14:33

## 2022-10-31 RX ADMIN — PROPOFOL 100 MCG/KG/MIN: 10 INJECTION, EMULSION INTRAVENOUS at 09:13

## 2022-10-31 RX ADMIN — OCTREOTIDE ACETATE 50 MCG/HR: 1000 INJECTION, SOLUTION INTRAVENOUS; SUBCUTANEOUS at 08:14

## 2022-10-31 ASSESSMENT — ENCOUNTER SYMPTOMS
SORE THROAT: 0
ABDOMINAL DISTENTION: 1
STRIDOR: 0
BLOOD IN STOOL: 0
SHORTNESS OF BREATH: 0
WHEEZING: 0
VOMITING: 0
DIARRHEA: 0
COUGH: 0
NAUSEA: 1
APNEA: 0
ABDOMINAL PAIN: 1
CHOKING: 0
SHORTNESS OF BREATH: 1
CHEST TIGHTNESS: 0
SINUS PRESSURE: 0
PHOTOPHOBIA: 0
CONSTIPATION: 0
SINUS PAIN: 0

## 2022-10-31 ASSESSMENT — PAIN - FUNCTIONAL ASSESSMENT: PAIN_FUNCTIONAL_ASSESSMENT: 0-10

## 2022-10-31 ASSESSMENT — LIFESTYLE VARIABLES: SMOKING_STATUS: 0

## 2022-10-31 NOTE — PLAN OF CARE
Problem: Discharge Planning  Goal: Discharge to home or other facility with appropriate resources  10/31/2022 0343 by Antelmo Dias RN  Outcome: Progressing     Problem: Safety - Adult  Goal: Free from fall injury  10/31/2022 0343 by Antelmo Dias RN  Outcome: Progressing  Note: No falls noted this shift. Patient ambulates with x1 staff assistance without difficulty. Bed kept in low position. Safe environment maintained. Bedside table & call light in reach. Uses call light appropriately when needing assistance.        Problem: Metabolic/Fluid and Electrolytes - Adult  Goal: Electrolytes maintained within normal limits  Outcome: Progressing  Flowsheets (Taken 10/31/2022 0343)  Electrolytes maintained within normal limits: Monitor labs and assess patient for signs and symptoms of electrolyte imbalances

## 2022-10-31 NOTE — PROGRESS NOTES
10/31/22 1144   Encounter Summary   Encounter Overview/Reason  Palliative Care   Service Provided For: Patient   Referral/Consult From: Palliative Care   Last Encounter  10/31/22   Spiritual/Emotional needs   Type Spiritual Support   Palliative Care   Type Palliative Care, Follow-up   Assessment/Intervention/Outcome   Assessment Unable to assess  (Sleeping)   Intervention Prayer (assurance of)/[place]

## 2022-10-31 NOTE — PROGRESS NOTES
Patient at time of evaluation, was down for EGD. Writer will reevaluate patient, once patient is back on floor.     Electronically signed by Shelley Ferrera MD on 10/31/2022 at 7:55 AM

## 2022-10-31 NOTE — PROGRESS NOTES
Attending Physician Statement  I have discussed the care of Sohan Dorado with the resident team. I have examined the patient myself and taken ros and hpi , including pertinent history and exam findings,  with the resident. I have reviewed the key elements of all parts of the encounter with the resident. I agree with the assessment, plan and orders as documented by the resident. Principal Problem:    GI bleed  Active Problems:    Ascites    Portal hypertension (HCC)    Hepatic cirrhosis (HCC)    Hypotension  Resolved Problems:    * No resolved hospital problems. *    Liver cirrhosis, ascites, portal hypertensive gastropathy, recurrent ascites, recurrent anemia on CBC weekly check outpatient, esophageal adenoca, that is post chemoradiation, admitted for low hemoglobin of 5.1, tarry stools  S/p 3 units PRBC   Placed on PPI and octreotide infusion, on IV Rocephin  EGD today -telangiectasia related to radiation for esophageal cancer,   Will need evaluation for TIPS  Resume Lasix, Aldactone, nadolol    Anticipate discharge tomorrow if hemoglobin remains stable    Full note to follow.       Electronically signed by Suzan Tabor MD

## 2022-10-31 NOTE — ANESTHESIA PRE PROCEDURE
Department of Anesthesiology  Preprocedure Note       Name:  Jian Merritt   Age:  60 y.o.  :  1960                                          MRN:  [id number]         Date:  10/31/2022      Surgeon: Rosalia Lord):  Fina Hong MD    Procedure: Procedure(s):  EGD BIOPSY    Medications prior to admission:   Prior to Admission medications    Medication Sig Start Date End Date Taking? Authorizing Provider   FEROSUL 325 (65 Fe) MG tablet take 1 tablet by mouth twice a day 10/7/22   VASU Tejada   nadolol (CORGARD) 20 MG tablet take 1 tablet by mouth once daily 22   Jose Dunham APRN - NP   midodrine (PROAMATINE) 5 MG tablet Take 2 tablets by mouth in the morning and 2 tablets at noon and 2 tablets before bedtime.  22   MASSIMO King - NP   furosemide (LASIX) 20 MG tablet Take 1 tablet by mouth 2 times daily 22   MASSIMO King - NP   spironolactone (ALDACTONE) 100 MG tablet Take 1 tablet by mouth every evening  Patient not taking: No sig reported 22   MASSIMO King - NP   ondansetron (ZOFRAN-ODT) 4 MG disintegrating tablet dissolve 1 tablet by mouth every 8 hours if needed for nausea and vomiting 22   VASU Tejada   lactulose [facility]) 10 GM/15ML solution take 30 milliliters by mouth three times a day 22   VASU Tejada   FLUoxetine (PROZAC) 20 MG capsule Take 1 capsule by mouth daily 22   Jerold Leventhal, MD   atorvastatin (LIPITOR) 20 MG tablet Take 1 tablet by mouth nightly 22   Jerold Leventhal, MD       Current medications:    Current Facility-Administered Medications   Medication Dose Route Frequency Provider Last Rate Last Admin    0.9 % sodium chloride infusion   IntraVENous Continuous Shira Keith  mL/hr at 10/31/22 0814 New Bag at 10/31/22 0814    [MAR Hold] 0.9 % sodium chloride infusion   IntraVENous PRN Roxanne Soto MD        [facility] Hold] 0.9 % sodium chloride infusion   IntraVENous PRN Rommel Guidry MD        [facility] Hold] midodrine (PROAMATINE) tablet 5 mg  5 mg Oral TID PRN Kath Mathew MD   5 mg at 10/30/22 1845    [MAR Hold] midodrine (PROAMATINE) tablet 10 mg  10 mg Oral 4x daily Koko Burrows MD   10 mg at 10/30/22 2125    [MAR Hold] ferrous sulfate (IRON 325) tablet 325 mg  325 mg Oral Every Other Day Koko Burrows MD        [facility] Hold] pantoprazole (PROTONIX) 40 mg in sodium chloride (PF) 0.9 % 10 mL injection  40 mg IntraVENous Daily Jose Dasilva MD        [MAR Hold] oxyCODONE (ROXICODONE) immediate release tablet 2.5 mg  2.5 mg Oral Q6H PRN Koko Burrows MD   2.5 mg at 10/30/22 1403    [MAR Hold] 0.9 % sodium chloride infusion   IntraVENous PRN Brandyn Weldon MD        [facility]] sodium chloride flush 0.9 % injection 5-40 mL  5-40 mL IntraVENous 2 times per day Jose Dasilva MD   10 mL at 10/29/22 2148    [MAR Hold] sodium chloride flush 0.9 % injection 5-40 mL  5-40 mL IntraVENous PRN Jose Dasilva MD        [facility]] 0.9 % sodium chloride infusion   IntraVENous PRN Jose Dasilva MD 20 mL/hr at 10/29/22 2205 New Bag at 10/29/22 2205    [MAR Hold] ondansetron (ZOFRAN-ODT) disintegrating tablet 4 mg  4 mg Oral Q8H PRN Jose Dasilva MD   4 mg at 10/30/22 1310    Or    [MAR Hold] ondansetron (ZOFRAN) injection 4 mg  4 mg IntraVENous Q6H PRN Jose Dasilva MD   4 mg at 10/30/22 2125    [MAR Hold] polyethylene glycol (GLYCOLAX) packet 17 g  17 g Oral Daily PRN Jose Dasilva MD        [MAR Hold] pantoprazole (PROTONIX) 80 mg in sodium chloride 0.9 % 100 mL infusion  8 mg/hr IntraVENous Continuous Jose Dasilva MD 10 mL/hr at 10/31/22 0022 8 mg/hr at 10/31/22 0022    [MAR Hold] cefTRIAXone (ROCEPHIN) 1,000 mg in sodium chloride 0.9 % 50 mL IVPB mini-bag  1,000 mg IntraVENous Q24H Jose Dasilva MD   Stopped at 10/30/22 1738    [MAR Hold] octreotide (SANDOSTATIN) 500 mcg in sodium chloride 0.9 % 100 mL infusion  50 mcg/hr IntraVENous Continuous Jose Dasilva MD 10 mL/hr at 10/31/22 0814 50 mcg/hr at 10/31/22 0814    [MAR Hold] atorvastatin (LIPITOR) tablet 20 mg  20 mg Oral Nightly Neo Nicole MD   20 mg at 10/30/22 2126    [MAR Hold] lactulose (CHRONULAC) 10 GM/15ML solution 30 g  30 g Oral TID Neo Nicole MD   30 g at 10/30/22 2129    [Held by provider] nadolol (CORGARD) tablet 20 mg  20 mg Oral Daily Neo Nicole MD   20 mg at 10/29/22 2152    [Held by provider] furosemide (LASIX) tablet 20 mg  20 mg Oral BID Neo Nicole MD   20 mg at 10/29/22 2143       Allergies:  No Known Allergies    Problem List:    Patient Active Problem List   Diagnosis Code    Back pain, chronic M54.9, G89.29    Hearing difficulty H91.90    GERD (gastroesophageal reflux disease) K21.9    Cervical radicular pain M54.12    Alcohol withdrawal syndrome without complication (HCC) J54.460    Hypomagnesemia E83.42    Chronic viral hepatitis B without delta agent and without coma (HCC) B18.1    Calculus of gallbladder without cholecystitis K80.20    Hep C w/o coma, chronic (HCC) B18.2    Fatty liver K76.0    Psychophysiologic insomnia F51.04    Cirrhosis (HCC) K74.60    Hepatic cirrhosis ([facility] [facility] K74.60    Vertebrogenic low back pain M54.51    DDD (degenerative disc disease), lumbar M51.36    Depression F32. A    Tubular adenoma of colon D12.6    History of colon polyps Z86.010    Gynecomastia, male N58    Lumbar radiculitis M54.16    Lumbar disc herniation M51.26    Tinnitus H93.19    Eustachian tube dysfunction H69.80    Ganglion cyst M67.40    Carpal tunnel syndrome of right wrist G56.01    History of hepatitis C Z86.19    Vitamin D deficiency E55.9    Pure hypercholesterolemia E78.00    Hypokalemia E87.6    Essential hypertension I10    Recurrent major depressive disorder in partial remission (HCC) F33.41    S/P epidural steroid injection Z92.241    Elevated LFTs R79.89    Seasonal allergies J30.2    Lumbar facet arthropathy M47.816    Cervical facet syndrome H59.022    Acute gastrointestinal bleeding K92.2    Thrombocytopenia (HCC) D69.6    Hepatitis C virus infection resolved after antiviral drug therapy Z86.19    Gastrointestinal hemorrhage with melena K92.1    Alcohol abuse F10.10    Altered mental status R41.82    Hypocalcemia E83.51    Hypophosphatemia E83.39    Malignant neoplasm of lower third of esophagus (HCC) C15.5    COVID-19 U07.1    Anxiety F41.9    Current smoker F17.200    Severe comorbid illness R69    Gait instability R26.81    Abnormal findings on diagnostic imaging of spine R93.7    Cervical spinal stenosis M48.02    Spinal stenosis of lumbar region with neurogenic claudication M48.062    Low hemoglobin D64.9    Symptomatic anemia, microcytic, acute D64.9    Hypotension I95.9    Former smoker, 50+ pack years, quit 2016 Z87.891    HLD (hyperlipidemia) E78.5    Esophageal adenocarcinoma (HCC) C15.9    Anemia D64.9    Acute kidney injury ([facility] [address] [facility] N17.9    Ascites R18.8    Shortness of breath R06.02    Drop in hemoglobin R71.0    Portal hypertension (HCC) K76.6    Secondary esophageal varices (HCC) I85.10    Esophageal polyp K22.81    Acute kidney failure, unspecified (HCC) N17.9    Muscle weakness (generalized) M62.81    Other abnormalities of gait and mobility R26.89    GI bleed K92.2       Past Medical History:        Diagnosis Date    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 ([address]     Alcoholic cirrhosis of liver with ascites ([address]     Anemia 04/13/2022    Anxiety     Arthritis     Back pain, chronic     dr. Anum Mcneil, orthopedic, every 3-4 months, gets steroid injection    Lezama esophagus     BPH (benign prostatic hypertrophy)     Cholelithiasis     Cirrhosis ([address]     COVID-19 12/2020    pt reports he had a positive test while at [facility] in 2020, was asymptomatic    COVID-19 vaccine series completed 5/20/2021, 6/22/2021    Moderna 5/20/2021, 6/22/2021    DDD (degenerative disc disease), lumbar     Depression     Esophageal cancer ([facility] [address] [facility]     INVASIVE ADENOCARCINOMA ARISING IN TUBULAR ADENOMA WITH HIGH GRADE DYSPLASIA, ASSOCIATED WITH FOCAL INTESTINAL METAPLASIA     Esophageal varices (HCC)     Fatty liver     GERD (gastroesophageal reflux disease)     Hep C w/o coma, chronic (HCC)     History of alcohol abuse     6-12 beers a day; quit drinking 2019    History of blood transfusion     History of colon polyps 2016    History of tobacco abuse     Yuhaaviatam (hard of hearing)     Hyperlipidemia     Hypertension     Hyponatremia 07/20/2016    Port-A-Cath in place     right upper chest    Portal hypertension ([facility] [address] [facility]     Sciatica     Secondary esophageal varices ([address] 06/07/2022    Shortness of breath     Spinal stenosis     Stomach ulcer     hx of    Tubular adenoma of colon 2016, 2018    Vitamin D deficiency     Wears glasses        Past Surgical History:        Procedure Laterality Date    BUNIONECTOMY      twice on right side    BUNIONECTOMY Left     CARPAL TUNNEL RELEASE Right     COLONOSCOPY      at age 35    COLONOSCOPY  10/05/2016    polyps-pathology tubular adenoma, and abnormal looking mucosa right colon-pathology-tubular adenoma    COLONOSCOPY N/A 03/30/2018    COLONOSCOPY POLYPECTOMY COLD BIOPSY performed by Sariah French MD at [address] [place] [address]  03/30/2018    Small polyp in the sigmoid colon and excised with biopsy forceps--tubular adenoma    COLONOSCOPY N/A 04/16/2022    COLONOSCOPY POLYPECTOMY performed by Sariah French MD at [place] [address], COLON, DIAGNOSTIC      EGD    IR PORT PLACEMENT EQUAL OR GREATER THAN 5 YEARS  04/19/2021    IR PORT PLACEMENT EQUAL OR GREATER THAN 5 YEARS 4/19/2021 [facility]ED[facility]    KNEE SURGERY Left     cyst removed    NASAL SEPTUM SURGERY      NERVE BLOCK Right 11/23/2020    NERVE BLOCK RIGHT CERVICAL STEROID INJECTION  C3-C6 performed by Erasmo Kelsey MD at [address] [place] [address]  01/04/2016    steroid injection C7 T1  OTHER SURGICAL HISTORY  11/21/2016    Bilateral Lumbar CACHORRO L4-L5 injections    OTHER SURGICAL HISTORY  12/19/2016    lumbar steroid injection    OTHER SURGICAL HISTORY  09/28/2018    BILATERAL L5 CACHORRO (N/A Back)    OTHER SURGICAL HISTORY Right 11/23/2020    cervical injection    PAIN MANAGEMENT PROCEDURE Left 07/09/2020    EPIDURAL STEROID INJECTION LEFT L4 L5 performed by Pieter Ceballos MD at [address] Left 07/20/2020    LEFT L4 L5 EPIDURAL STEROID INJECTION performed by Pieter Ceballos MD at [address] Bilateral 08/17/2020    LUMBAR FACET BILATERAL L2-L5 performed by Pieter Ceballos MD at [address] Bilateral 12/07/2020    NERVE BLOCK BILATERAL LUMBAR MEDIAL BRANCH L2-L5 performed by Pieter Ceballos MD at [address]      Multiple    [place] [address] AA&/STRD TFRML EPI LUMBAR/SACRAL 1 LEVEL Bilateral 09/06/2018    BILATERAL L5 CACHORRO performed by Pieter Ceballos MD at [address] AA&/STRD TFRML EPI LUMBAR/SACRAL 1 LEVEL N/A 09/28/2018    BILATERAL L5 CACHORRO performed by Pieter Ceballos MD at [address] [place] [address] N/CARPAL TUNNEL SURG Right 08/29/2017    CARPAL TUNNEL RELEASE RIGHT performed by Shana Dudley MD at [address] [place] [address] N/CARPAL TUNNEL SURG Left 10/31/2017    CARPAL TUNNEL RELEASE performed by Shana Dudley MD at [facility] 12/29/2020    EGD BIOPSY performed by Amy Hoskins MD at [address] [place] [address] 02/02/2021    EGD BIOPSY and spot marking performed by Paco Royal MD at [address] [place] [address] 02/12/2021    ENDOSCOPIC ULTRASOUND, EGD performed by Peyton Jovel MD at [place] [address]  02/12/2021    EGD DIAGNOSTIC ONLY performed by Peyton Jovel MD at [facility] N/A 08/31/2021    EGD BIOPSY performed by Juaquin Cleveland Charlee Daniels MD at [address] [place] [address] 2022    EGD BIOPSY performed by Tanvi Lombardi MD at [address] [place] [address] 04/15/2022    EGD ESOPHAGOGASTRODUODENOSCOPY performed by Sara Lyn MD at [address] [place] [address] 2022    EGD BAND LIGATION performed by Sharri Mcfarlane MD at [address] [place] [address] N/A 2022    EGD ESOPHAGOGASTRODUODENOSCOPY performed by Sharri Mcfarlane MD at [address] [place] [address] 2022    EGD ESOPHAGOGASTRODUODENOSCOPY ENDOSCOPIC APC AT [facility] performed by Reji Smith MD at [address] [place] [address] N/A 10/19/2022    EGD BIOPSY performed by Tanvi Lombardi MD at [facility] History:    Social History     Tobacco Use    Smoking status: Former     Packs/day: 1.00     Years: 45.00     Pack years: 45.00     Types: Cigarettes     Quit date: 2017     Years since quittin.7    Smokeless tobacco: Never   Substance Use Topics    Alcohol use: Not Currently     Comment: Quit alcohol in 2019- heavier drinking prior to quitting                                Counseling given: Not Answered      Vital Signs (Current):   Vitals:    10/30/22 2045 10/31/22 0005 10/31/22 0645 10/31/22 0755   BP: 99/68 100/64 111/69 103/67   Pulse:  54 63 61   Resp:  16 16 18   Temp:  97.8 °F (36.6 °C) 97.7 °F (36.5 °C) 97.5 °F (36.4 °C)   TempSrc:  Axillary Oral Infrared   SpO2:  96% 99% 99%   Weight:  208 lb 12.4 oz (94.7 kg)     Height:                                                  BP Readings from Last 3 Encounters:   10/31/22 103/67   10/28/22 (!) 101/56   10/27/22 102/62       NPO Status: Time of last liquid consumption: 2300                        Time of last solid consumption: 1800                        Date of last liquid consumption: 10/30/22                        Date of last solid food consumption: 10/30/22    BMI:   Wt Readings from Last 3 Encounters:   10/31/22 208 lb 12.4 oz (94.7 kg)   10/27/22 202 lb (91.6 kg)   10/21/22 198 lb (89.8 kg)     Body mass index is 29.96 kg/m². CBC:   Lab Results   Component Value Date/Time    WBC 8.0 10/31/2022 05:09 AM    RBC 3.27 10/31/2022 05:09 AM    RBC 4.75 04/19/2012 11:30 AM    HGB 8.7 10/31/2022 05:09 AM    HCT 28.1 10/31/2022 05:09 AM    MCV 86.1 10/31/2022 05:09 AM    RDW 16.1 10/31/2022 05:09 AM     10/31/2022 05:09 AM     04/19/2012 11:30 AM     HB 8.7    CMP:   Lab Results   Component Value Date/Time     10/31/2022 05:09 AM    K 4.1 10/31/2022 05:09 AM     10/31/2022 05:09 AM    CO2 21 10/31/2022 05:09 AM    BUN 15 10/31/2022 05:09 AM    CREATININE 0.70 10/31/2022 05:09 AM    GFRAA >60 09/28/2022 01:33 PM    LABGLOM >60 10/31/2022 05:09 AM    GLUCOSE 140 10/31/2022 05:09 AM    GLUCOSE 65 04/19/2012 11:30 AM    PROT 5.3 10/28/2022 04:31 PM    CALCIUM 8.2 10/31/2022 05:09 AM    BILITOT 1.0 10/28/2022 04:31 PM    ALKPHOS 87 10/28/2022 04:31 PM    AST 23 10/28/2022 04:31 PM    ALT 10 10/28/2022 04:31 PM       POC Tests: No results for input(s): POCGLU, POCNA, POCK, POCCL, POCBUN, POCHEMO, POCHCT in the last 72 hours.     Coags:   Lab Results   Component Value Date/Time    PROTIME 15.3 10/14/2022 01:00 PM    INR 1.2 10/14/2022 01:00 PM    APTT 35.0 10/14/2022 01:00 PM       HCG (If Applicable): No results found for: PREGTESTUR, PREGSERUM, HCG, HCGQUANT     ABGs: No results found for: PHART, PO2ART, RPL6VOE, UMW1NRU, BEART, W4NIVWLW     Type & Screen (If Applicable):  No results found for: LABABO, LABRH    Drug/Infectious Status (If Applicable):  Lab Results   Component Value Date/Time    HEPCAB REACTIVE 12/23/2020 05:09 PM       COVID-19 Screening (If Applicable):   Lab Results   Component Value Date/Time    COVID19 Not Detected 09/12/2022 10:32 PM    COVID19 Not Detected 07/17/2020 10:00 AM Anesthesia Evaluation  Patient summary reviewed and Nursing notes reviewed no history of anesthetic complications:   Airway: Mallampati: II  TM distance: >3 FB   Neck ROM: full  Mouth opening: > = 3 FB   Dental:    (+) edentulous      Pulmonary:normal exam  breath sounds clear to auscultation  (+) shortness of breath: no interval change,      (-) pneumonia, COPD, asthma, recent URI, sleep apnea, rhonchi, wheezes, rales, stridor, not a current smoker and no decreased breath sounds          Patient did not smoke on day of surgery. Cardiovascular:  Exercise tolerance: good (>4 METS),   (+) hypertension: no interval change,     (-) pacemaker, valvular problems/murmurs, past MI, CAD, CABG/stent, dysrhythmias,  angina,  CHF, orthopnea, PND,  FARIA, murmur, weak pulses,  friction rub, systolic click, carotid bruit,  JVD, peripheral edema, no pulmonary hypertension and no hyperlipidemia    ECG reviewed  Rhythm: regular  Rate: normal  Echocardiogram reviewed         Beta Blocker:  Dose within 24 Hrs         Neuro/Psych:   (+) neuromuscular disease:, psychiatric history:   (-) seizures, TIA, CVA, headaches and depression/anxiety            GI/Hepatic/Renal:   (+) GERD: no interval change, PUD, hepatitis:, liver disease:,      (-) hiatal hernia, no renal disease, bowel prep and no morbid obesity       Endo/Other: Negative Endo/Other ROS   (+) no malignancy/cancer. (-) diabetes mellitus, hypothyroidism, hyperthyroidism, blood dyscrasia, arthritis, no electrolyte abnormalities, no malignancy/cancer               Abdominal:             Vascular: negative vascular ROS. - PVD, DVT and PE. Other Findings:           Anesthesia Plan      general     ASA 3       Induction: intravenous. MIPS: Postoperative opioids intended and Prophylactic antiemetics administered. Anesthetic plan and risks discussed with patient. Plan discussed with CRNA.                     Renne Dandy, MD 10/31/2022

## 2022-10-31 NOTE — CONSULTS
..  Palliative Care Inpatient Consult    NAME:  Sandy Alfaro  MEDICAL RECORD NUMBER:  [id number]  AGE: 60 y.o. GENDER: male  : 1960  TODAY'S DATE:  10/31/2022    Reasons for Consultation:    Symptom and/or pain management  Provision of information regarding PC and/or hospice philosophies  Complex, time-intensive communication and interdisciplinary psychosocial support  Clarification of goals of care and/or assistance with difficult decision-making  Guidance in regards to resources and transition(s)    Members of PC team contributing to this consultation are : Anselmo Malone, Palliative Care APRN-CNP    Plan      Palliative Interaction: I went to see patient and she was lying in bed with eyes closed. He opened his eyes to verbal stimuli and I introduced the palliative role to him. I discussed patients chronic history including Esophageal cancer and liver disease. He reports that he was dx with cancer years ago and follows with Dr. Clarisa Hargrove. Per notes he has f/u scans on 11/3 but he does not seem to be aware of these. He reports being dx with liver disease many years ago and has quit alcohol use. He follows with Dr. Diane Lundberg for last 7 years. Patient reports that he gets paracentesis now QFriday. I discussed how his liver disease is progressive in nature and no cure other than to see if eligible for transplant. I discussed patients wishes, and he wants to continue with aggressive treatment. He reports living alone and is independent. I explained code classifications to him and he elects to remain a full code. I discussed HCPOA and he reports that ex-wife Vijaya Ozuna is POA. I informed him of the importance of copy being on chart. I asked about patients chronic symptoms and he reports nausea, and abdominal pain. I attempted to explain palliative care to him but he doesn't seem to understand. I offered to bring brochure next visit.      Patient reports that he is waiting on RN to figure out what the plan is. He reports having surgery this AM and is aware that they found bleeding and cauterized it. He reports that stools have cleared up. He has \"splitting headache\" and some continued nausea. I put call light on for patient to assist with addressing his concerns. I offered him support at this time. Education/support to staff  Education/support to patient  Communications with primary service  Providing support for coping/adaptation/distress of patient  Discussing meaning/purpose   Decisional capacity assessed  Continue with current plan of care  Clarification of medical condition to patient and family  Code status clarified: Full Code  Palliative care orders introduced  Principle Problem/Diagnosis:  GI bleed    Additional Assessments:   Principal Problem:    GI bleed  Active Problems:    Ascites    Portal hypertension ([facility]    Hepatic cirrhosis (HCC)    Hypotension  Resolved Problems:    * No resolved hospital problems. *    1- Symptom management/ pain control     Pain Assessment:  The patient is not having any pain. Anxiety:  none                          Dyspnea:  none                          Fatigue:  none    Other: We feel the patient symptoms are being controlled. his current regimen is reviewed by myself and discussed with the staff. 2- Goals of care evaluation   The patient goals of care are live longer, improve or maintain function/quality of life, remain at home, preserve independence/autonomy/control, and support for family/caregiver   Goals of care discussed with:    [x] Patient independently    [] Patient and Family    [] Family or Healthcare DPOA independently    [] Unable to discuss with patient, family/DPOA not present    3- Code Status  Full Code    4- Other recommendations   - We will continue to provide comfort and support to the patient and the family  Please call with any palliative questions or concerns.   Palliative Care Team is available via perfect serve or via phone. Palliative Care will continue to follow Mr. Bryce Maria care as needed. History of Present Illness     The patient is a 60 y.o. Non- / non  male who presents with Abnormal Lab (HGB 5.1)      Referred to Palliative Care by   [x] Physician   [] Nursing  [] Family Request   [] Other:       He was admitted to the primary service for GI bleed [K92.2]  Symptomatic anemia [D64.9]. His hospital course has been associated with GI bleed, symptomatic anemia, history of esophageal cancer, and decompensated alcoholic's liver cirrhosis. The patient has a complicated medical history and has been hospitalized since 10/29/2022 10:53 AM. I reviewed patients EMR, spoke to RN, and patient/family to collect history. Patient has history of COVID-19, anemia, colon polyps, GERD, hep C, cirrhosis, cholelithiasis, esophageal varices, tubular adenoma of the colon, stomach ulcer, BPH, HTN, HLD, anxiety/depression, EtOH abuse, portal hypertension, sciatica, spinal stenosis, vitamin D deficiency, and esophageal cancer s/p chemoradiation. Patient had paracentesis on the 28th with 8.1 L drained. Patient presented to the ED for abnormal lab, and was directed to the ED. Patient's hemoglobin was 5.1 yesterday, and patient reports having dark tarry stools. Patient still has some abdominal distention even after paracentesis yesterday. Patient reports some lightheadedness and dizziness. He is a little unsteady when he walks. He denied any chest pain or shortness of breath. It was noted that patient had endoscopy 1 week prior that showed small esophageal varices, and ulcer at GE junction from the previous variceal banding. Sliding hiatal hernia, angiectasia, and portal hypertensive gastropathy was also appreciated. Patient's admitting pertinent labs included; hemoglobin 5.6. Patient was transfused immediately with 1 unit of PRBCs. Patient was started on octreotide and Protonix drips.   Patient was given IV antibiotics for possible underlying SBP. It was noted that patient is to have follow-up scans for esophageal cancer on 11/3. GI was consulted, and EGD planned with possible cauterization of angio ectasia. Patient is a full code. Palliative care consulted for goals. Patient had EGD this a.m. with severe angiectasia of hiatal hernia and GE junction. May be secondary to radiation therapy/portal hypertensive gastropathy. Oozing of blood from this area s/p cauterization.     Active Hospital Problems    Diagnosis Date Noted    GI bleed [K92.2] 09/13/2022     Priority: Medium    Portal hypertension ([address] [K76.6]      Priority: Medium    Ascites [R18.8] 04/26/2022     Priority: Medium    Hypotension [I95.9] 12/20/2021    Hepatic cirrhosis ([address] [K74.60] 09/15/2016       PAST MEDICAL HISTORY      Diagnosis Date    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 ([address]     Alcoholic cirrhosis of liver with ascites ([address]     Anemia 04/13/2022    Anxiety     Arthritis     Back pain, chronic     dr. Yan Castro, orthopedic, every 3-4 months, gets steroid injection    Lezama esophagus     BPH (benign prostatic hypertrophy)     Cholelithiasis     Cirrhosis ([address]     COVID-19 12/2020    pt reports he had a positive test while at [facility] in 2020, was asymptomatic    COVID-19 vaccine series completed 5/20/2021, 6/22/2021    Moderna 5/20/2021, 6/22/2021    DDD (degenerative disc disease), lumbar     Depression     Esophageal cancer ([address]     INVASIVE ADENOCARCINOMA ARISING IN TUBULAR ADENOMA WITH HIGH GRADE DYSPLASIA, ASSOCIATED WITH FOCAL INTESTINAL METAPLASIA     Esophageal varices ([address]     Fatty liver     GERD (gastroesophageal reflux disease)     Hep C w/o coma, chronic ([address]     History of alcohol abuse     6-12 beers a day; quit drinking 2019    History of blood transfusion     History of colon polyps 2016    History of tobacco abuse     [place]ha[place]iatam (hard of hearing)     Hyperlipidemia     Hypertension     Hyponatremia 07/20/2016    Port-A-Cath in place     right upper chest    Portal hypertension ([facility]     Sciatica     Secondary esophageal varices ([address] 06/07/2022    Shortness of breath     Spinal stenosis     Stomach ulcer     hx of    Tubular adenoma of colon 2016, 2018    Vitamin D deficiency     Wears glasses        PAST SURGICAL HISTORY  Past Surgical History:   Procedure Laterality Date    BUNIONECTOMY      twice on right side    BUNIONECTOMY Left     CARPAL TUNNEL RELEASE Right     COLONOSCOPY      at age 35    COLONOSCOPY  10/05/2016    polyps-pathology tubular adenoma, and abnormal looking mucosa right colon-pathology-tubular adenoma    COLONOSCOPY N/A 03/30/2018    COLONOSCOPY POLYPECTOMY COLD BIOPSY performed by Ford Cancino MD at [address] [place] [address]  03/30/2018    Small polyp in the sigmoid colon and excised with biopsy forceps--tubular adenoma    COLONOSCOPY N/A 04/16/2022    COLONOSCOPY POLYPECTOMY performed by Ford Cancino MD at [address] [place] [address], [place], DIAGNOSTIC      EGD    IR PORT PLACEMENT EQUAL OR GREATER THAN 5 YEARS  04/19/2021    IR PORT PLACEMENT EQUAL OR GREATER THAN 5 YEARS 4/19/2021 [facility] SPECIAL PROCEDURES    KNEE SURGERY Left     cyst removed    NASAL SEPTUM SURGERY      NERVE BLOCK Right 11/23/2020    NERVE BLOCK RIGHT CERVICAL STEROID INJECTION  C3-C6 performed by Nettie Castillo MD at [address] [place] [address]  01/04/2016    steroid injection C7 T1    OTHER SURGICAL HISTORY  11/21/2016    Bilateral Lumbar CACHORRO L4-L5 injections    OTHER SURGICAL HISTORY  12/19/2016    lumbar steroid injection    OTHER SURGICAL HISTORY  09/28/2018    BILATERAL L5 CACHORRO (N/A Back)    OTHER SURGICAL HISTORY Right 11/23/2020    cervical injection    PAIN MANAGEMENT PROCEDURE Left 07/09/2020    EPIDURAL STEROID INJECTION LEFT L4 L5 performed by Nettie Castillo MD at [place] [address] Left 07/20/2020    LEFT L4 L5 EPIDURAL STEROID INJECTION performed by Nettie Castillo MD at [address] [place] [address] PROCEDURE Bilateral 08/17/2020    LUMBAR FACET BILATERAL L2-L5 performed by Thiago Hester MD at [address] Bilateral 12/07/2020    NERVE BLOCK BILATERAL LUMBAR MEDIAL BRANCH L2-L5 performed by Thiago Hester MD at [address] [facility]      [place] [address] AA&/STRD TFRML EPI LUMBAR/SACRAL 1 LEVEL Bilateral 09/06/2018    BILATERAL L5 CACHORRO performed by Thiago Hester MD at [facility] AA&/STRD TFRML EPI LUMBAR/SACRAL 1 LEVEL N/A 09/28/2018    BILATERAL L5 CACHORRO performed by Thiago Hester MD at [address] [place] [address] N/CARPAL TUNNEL SURG Right 08/29/2017    CARPAL TUNNEL RELEASE RIGHT performed by Evelin Kimble MD at [address] [place] [address] N/CARPAL TUNNEL SURG Left 10/31/2017    CARPAL TUNNEL RELEASE performed by Evelin Kimble MD at [address] [place] [address] 12/29/2020    EGD BIOPSY performed by Yuly Syed MD at [address] [place] [address] 02/02/2021    EGD BIOPSY and spot marking performed by Phil Naranjo MD at [address] [place] [address] N/A 02/12/2021    ENDOSCOPIC ULTRASOUND, EGD performed by Jian Murillo MD at [place] [address]  02/12/2021    EGD DIAGNOSTIC ONLY performed by Jian Murillo MD at [facility] 1 08/31/2021    EGD BIOPSY performed by Phil Naranjo MD at [address] [place] [address] 01/21/2022    EGD BIOPSY performed by Phil Naranjo MD at [address] [place] [address] 04/15/2022    EGD ESOPHAGOGASTRODUODENOSCOPY performed by Yuly Syed MD at [address] [place] [address] 06/06/2022    EGD BAND LIGATION performed by Opal Terry MD at [address] [place] [address] N/A 06/09/2022    EGD ESOPHAGOGASTRODUODENOSCOPY performed by Opal Terry MD at [address] [place] [address] 9/14/2022    EGD ESOPHAGOGASTRODUODENOSCOPY ENDOSCOPIC APC AT GE JUNCTION performed by Bita Kan MD at [address] [facility] N/A 10/19/2022    EGD BIOPSY performed by [id number], [place] at [address] [facility]  Social History     Tobacco Use    Smoking status: Former     Packs/day: 1.00     Years: 45.00     Pack years: 45.00     Types: Cigarettes     Quit date: 2017     Years since quittin.7    Smokeless tobacco: Never   Vaping Use    Vaping Use: Never used   Substance Use Topics    Alcohol use: Not Currently     Comment: Quit alcohol in 2019- heavier drinking prior to quitting    Drug use: Not Currently     Frequency: 1.0 times per week     Types: Cocaine     Comment: Cocaine- stopped spring 2016       FAMILY HISTORY  . [place]   Family History   Problem Relation Age of Onset    Cancer Mother         pancreatic    Cancer Father         bone    Diabetes Sister     Asthma Brother         ALLERGIES  No Known Allergies      MEDICATIONS  Current Medications    sodium chloride flush  5-40 mL IntraVENous 2 times per day    [MAR Hold] midodrine  10 mg Oral 4x daily    [MAR Hold] ferrous sulfate  325 mg Oral Every Other Day    [MAR Hold] pantoprazole (PROTONIX) 40 mg injection  40 mg IntraVENous Daily    [MAR Hold] sodium chloride flush  5-40 mL IntraVENous 2 times per day    [MAR Hold] cefTRIAXone (ROCEPHIN) IV  1,000 mg IntraVENous Q24H    [MAR Hold] atorvastatin  20 mg Oral Nightly    [MAR Hold] lactulose  30 g Oral TID    [Held by provider] nadolol  20 mg Oral Daily    [Held by provider] furosemide  20 mg Oral BID     sodium chloride flush, sodium chloride, acetaminophen, meperidine, fentanNYL, HYDROmorphone, ondansetron, metoclopramide, diphenhydrAMINE, labetalol **OR** hydrALAZINE, [MAR Hold] sodium chloride, [MAR Hold] sodium chloride, [MAR Hold] midodrine, [MAR Hold] oxyCODONE, [MAR Hold] sodium chloride, [MAR Hold] sodium chloride flush, [MAR Hold] sodium chloride, [MAR Hold] ondansetron **OR** [MAR Hold] ondansetron, [MAR Hold] polyethylene glycol  IV Drips/Infusions   sodium chloride      [MAR Hold] sodium chloride      [MAR Hold] sodium chloride      [MAR Hold] sodium chloride      [MAR Hold] sodium chloride 20 mL/hr at 10/29/22 2205    [facility] Hold] pantoprazole 8 mg/hr (10/31/22 0022)    [MAR Hold] octreotide (SandoSTATIN) infusion 50 mcg/hr (10/31/22 0814)     Home Medications  No current facility-administered medications on file prior to encounter. Current Outpatient Medications on File Prior to Encounter   Medication Sig Dispense Refill    FEROSUL 325 (65 Fe) MG tablet take 1 tablet by mouth twice a day 60 tablet 5    nadolol (CORGARD) 20 MG tablet take 1 tablet by mouth once daily 30 tablet 2    midodrine (PROAMATINE) 5 MG tablet Take 2 tablets by mouth in the morning and 2 tablets at noon and 2 tablets before bedtime.  90 tablet 3    furosemide (LASIX) 20 MG tablet Take 1 tablet by mouth 2 times daily 60 tablet 3    spironolactone (ALDACTONE) 100 MG tablet Take 1 tablet by mouth every evening (Patient not taking: No sig reported) 30 tablet 1    ondansetron (ZOFRAN-ODT) 4 MG disintegrating tablet dissolve 1 tablet by mouth every 8 hours if needed for nausea and vomiting 10 tablet 0    lactulose (CHRONULAC) 10 GM/15ML solution take 30 milliliters by mouth three times a day 473 mL 1    FLUoxetine (PROZAC) 20 MG capsule Take 1 capsule by mouth daily 30 capsule 3    atorvastatin (LIPITOR) 20 MG tablet Take 1 tablet by mouth nightly 30 tablet 3         Data         BP (!) 121/55   Pulse 62   Temp 97.5 °F (36.4 °C) (Infrared)   Resp 17   Ht 5' 10\" (1.778 m)   Wt 208 lb 12.4 oz (94.7 kg)   SpO2 96%   BMI 29.96 kg/m²     Wt Readings from Last 3 Encounters:   10/31/22 208 lb 12.4 oz (94.7 kg)   10/27/22 202 lb (91.6 kg)   10/21/22 198 lb (89.8 kg)        Lab Results   Component Value Date    WBC 8.0 10/31/2022    HGB 8.7 (L) 10/31/2022    HCT 28.1 (L) 10/31/2022    MCV 86.1 10/31/2022     10/31/2022    ,   Lab Results   Component Value Date/Time     10/31/2022 05:09 AM    K 4.1 10/31/2022 05:09 AM     10/31/2022 05:09 AM    CO2 21 10/31/2022 05:09 AM    BUN 15 10/31/2022 05:09 AM    CREATININE 0.70 10/31/2022 05:09 AM    GLUCOSE 140 10/31/2022 05:09 AM    GLUCOSE 65 04/19/2012 11:30 AM    CALCIUM 8.2 10/31/2022 05:09 AM    ,   Lab Results   Component Value Date    INR 1.2 10/14/2022    INR 1.4 09/14/2022    INR 1.5 09/12/2022    PROTIME 15.3 (H) 10/14/2022    PROTIME 16.7 (H) 09/14/2022    PROTIME 17.7 (H) 09/12/2022   , . Lab Results   Component Value Date    ALT 10 10/28/2022    AST 23 10/28/2022    ALKPHOS 87 10/28/2022    BILITOT 1.0 10/28/2022   , No results found for: CKTOTAL, CKMB, CKMBINDEX, TROPONINI,   Lab Results   Component Value Date/Time    COLORU [place] 09/12/2022 11:03 PM    NITRU NEGATIVE 09/12/2022 11:03 PM    GLUCOSEU NEGATIVE 09/12/2022 11:03 PM    GLUCOSEU NEGATIVE 04/19/2012 11:30 AM    KETUA TRACE 09/12/2022 11:03 PM    UROBILINOGEN Normal 09/12/2022 11:03 PM    BILIRUBINUR SMALL 09/12/2022 11:03 PM    BILIRUBINUR - 05/05/2017 11:34 AM    BILIRUBINUR NEGATIVE 04/19/2012 11:30 AM   , No results found for: AMYLASE,   Lab Results   Component Value Date    LIPASE 23 09/12/2022   , No results found for: DDIMER, No results found for: BNP, No results found for: CEA, No results found for: , No results found for: AFPAMN,   Lab Results   Component Value Date    LACTA 2.0 04/13/2022   ,     CT Result (most recent):  CT CHEST ABDOMEN PELVIS W CONTRAST 07/25/2022    Narrative  EXAMINATION:  CT OF THE CHEST, ABDOMEN, AND PELVIS WITH CONTRAST 7/25/2022 2:50 pm    TECHNIQUE:  CT of the chest, abdomen and pelvis was performed with the administration of  intravenous contrast. Multiplanar reformatted images are provided for review.   Automated exposure control, iterative reconstruction, and/or weight based  adjustment of the mA/kV was utilized to reduce the radiation dose to as low  as reasonably achievable. COMPARISON:  04/25/2022    HISTORY:  ORDERING SYSTEM PROVIDED HISTORY: Esophageal adenocarcinoma ([facility]  TECHNOLOGIST PROVIDED HISTORY:    STAT Creatinine as needed:->No  restaging  Reason for Exam: restaging, Esophageal adenocarcinoma    FINDINGS:    Chest:    Mediastinum: Some thickening of the distal esophagus again noted along with  sliding hiatal hernia similar to prior. Periesophageal varices evident in  the distal esophagus. Similar to prior. Right-sided infusion port in place. No significant lymphadenopathy. Atherosclerotic disease in aorta and  coronary arteries. Lungs/pleura: Mild paraseptal emphysema unchanged. No significant lung  nodule or mass. No pleural effusion or pneumothorax. No focal consolidation. Soft Tissues/Bones: Right-sided infusion port in place terminating near  cavoatrial junction. No aggressive lytic or blastic lesion. Abdomen/Pelvis:    Organs: Nodular shrunken cirrhotic liver unchanged. No focal lesions. Cholelithiasis. Spleen, pancreas, kidneys and adrenal glands show no  significant abnormalities. GI/Bowel: Hiatal hernia otherwise stomach unremarkable. Small large bowel  loops show no acute process. Normal appendix. Pelvis: Urinary bladder unremarkable. No suspicious pelvic mass. Peritoneum/Retroperitoneum: Large volume ascites similar to prior. No  significant lymphadenopathy. Redemonstration of peripherally calcified  thrombosed 2 cm splenic artery aneurysm. Small upper abdominal varices again  noted. Bones/Soft Tissues: Diffuse degenerative changes. No aggressive lytic or  blastic lesions. Impression  1. No evidence for metastatic disease. 2. No significant lymphadenopathy. 3. Redemonstration of small hiatal hernia and some distal esophageal wall  thickening. 4. Large volume ascites similar to prior. Small paraesophageal and  gastrohepatic varices stable. Xray Result (most recent):  XR CHEST PORTABLE 09/12/2022    Narrative  EXAMINATION:  ONE XRAY VIEW OF THE CHEST    9/12/2022 6:53 pm    COMPARISON:  04/26/2022    HISTORY:  ORDERING SYSTEM PROVIDED HISTORY: weakness  TECHNOLOGIST PROVIDED HISTORY:  weakness  Reason for Exam: fatigue, SOB, dizziness. patient complains of sharp pain on  anterior left side of chest. patient stated he has low hemoglobin    FINDINGS:  The cardial-pericardial silhouette is unremarkable in appearance. The lungs  are clear. No pneumothorax is found. No free air is seen. No acute bony  abnormality. Mery catheter unchanged in position. Impression  Unremarkable portable chest radiograph. MRI Result (most recent): MRI LUMBAR SPINE WO CONTRAST 12/07/2021    Narrative  EXAMINATION:  MRI OF THE LUMBAR SPINE WITHOUT CONTRAST, 12/7/2021 7:35 am    TECHNIQUE:  Multiplanar multisequence MRI of the lumbar spine was performed without the  administration of intravenous contrast.    COMPARISON:  09/16/2016. HISTORY:  ORDERING SYSTEM PROVIDED HISTORY: Neurogenic claudication ([address]  TECHNOLOGIST PROVIDED HISTORY:  Reason for Exam: neurogenic claudication, chronic neck pain, bilateral  numbness and tingling of arms and legs    FINDINGS:  BONES/ALIGNMENT: Trace retrolisthesis of L1 on L2 and of L4 on L5. Marrow  signal appears within normal limits. Chronic T12 wedge compression fracture  deformity with 40% height loss and mild bony retropulsion (no high-grade  thecal sac stenosis). Multilevel intervertebral disc space narrowing, worse  at L1-L2, L3-L4, and L4-L5 where there is associated vacuum disc phenomenon  and Modic type 2 endplate changes. SPINAL CORD: The conus terminates normally. SOFT TISSUES: No paraspinal mass identified. L1-L2: Trace uncovering the disc with superimposed disc osteophyte complex. Mild/moderate thecal sac stenosis. Mild bilateral foraminal narrowing. L2-L3: Disc bulge eccentric to the left.   Mild thecal sac stenosis. Mild  left foraminal narrowing. L3-L4: Disc osteophyte complex. Moderate thecal sac stenosis. Moderate  right and mild/moderate left foraminal narrowing. Significant interval  progression. L4-L5: Uncovering the disc with superimposed disc osteophyte complex  eccentric to the left. Facet hypertrophy. No significant thecal sac  stenosis. Moderate right and severe left foraminal narrowing    L5-S1: No significant thecal sac stenosis or foraminal narrowing. Impression  1. Multilevel lumbar spondylosis, most notable at L4-L5 (moderate right and  severe left foraminal narrowing). Overall interval progression compared to  2016.  2. Chronic T12 wedge compression fracture deformity. Present in 2016. No results found for this or any previous visit. No results found for this or any previous visit (from the past 4464 hour(s)). Code Status: Full Code     ADVANCED CARE PLANNING:  Patient has capacity for medical decisions: yes-caution had anesthesia likely today  Health Care Power of : yes  Living Will: not asked     Personal, Social, and Family History  Marital Status:   Living situation:alone  Importance of el/Latter day/spiritual beliefs: [] Very [] Somewhat [] Not   Psychological Distress: mild  Does patient understand diagnosis/treatment?  yes  Does caregiver understand diagnosis/treatment? not asked      Assessment        REVIEW OF SYSTEMS  Constitutional: no fever, no chills or weight loss  Eyes: no eye pain or blurred vision  ENT: no hearing loss, congestion, or difficulty swallowing   Respiratory: no wheezing, chest tightness, or shortness of breath   Cardiovascular: no chest pain or pressure, no palpitations, no diaphoresis   Gastrointestinal: no vomiting, diarrhea or constipation, no melena +abdominal pain/distention/nausea  Genitourinary: no dysuria, frequency,hematuria , or nocturia   Musculoskeletal: no myalgias or arthralgias, no back pain Skin: no rashes or sores   Neurological: no focal weakness, numbness, tingling, or headache, no seizures    PHYSICAL ASSESSMENT:  Constitutional: Alert and oriented to person, place, and time. Head: Normocephalic and atraumatic. Eyes: EOM are normal. Pupils are equal, round   Neck: Normal range of motion. Neck supple. No tracheal deviation present. Cardiovascular: Normal rate and regular rhythm, S1, S2, no murmur   Pulmonary/Chest: Effort normal and breath sounds normal. No rales or wheezes. Abdomen: Soft. No tenderness, no organomegaly +abdominal distention/ascites  Musculoskeletal: Normal range of motion. No edema lower ext. Neurological: CN II-XII grossly intact, no focal neurological deficits   Skin: Normal turgor, no bleeding, no bruising     Palliative Performance Scale:  _x__60%  Ambulation reduced; Significant disease; Can't do hobbies/housework; intake normal or reduced; occasional assist; LOC full/confusion  ___50%  Mainly sit/lie; Extensive disease; Can't do any work; Considerable assist; intake normal or reduced; LOC full/confusion  ___40%  Mainly in bed; Extensive disease; Mainly assist; intake normal or reduced; LOC full/confusion   ___30%  Bed Bound; Extensive disease; Total care; intake reduced; LOCfull/confusion  ___20%  Bed Bound; Extensive disease; Total care; intake minimal; Drowsy/coma  ___10%  Bed Bound; Extensive disease; Total care; Mouth care only; Drowsy/coma  ___0       Death        Thank you for allowing Palliative Care to participate in the care of Mr. Braulio Momin . This note has been dictated by dragon, typing errors may be a possibility. The total time I spent in seeing the patient, discussing goals of care, advanced directives, code status and other major issues was more than 60 minutes      Electronically signed by   MASSIMO Hawk CNP  Palliative Care Team  on 10/31/2022 at 10:35 AM    Palliative Care can be reached via perfect serve.

## 2022-10-31 NOTE — PROGRESS NOTES
[address] [facility]   OCCUPATIONAL THERAPY MISSED TREATMENT NOTE   INPATIENT   Date: 10/31/22  Patient Name: Nneka Lerma       Room:   MRN: [id number]   Account #: [de-identified]    : 1960  (63 y.o.)  Gender: male                 REASON FOR MISSED TREATMENT:  Patient at testing and/or off the floor   -    Pt is off the floor for EGD.  OT will continue to follow  [facility],

## 2022-10-31 NOTE — PROGRESS NOTES
[address] [employer]    PROGRESS NOTE             10/31/2022    7:22 AM    Name:   Nancy Monsalve  MRN:     [id number]     Acct:      [de-identified]   Room:   2115/2115-01   Day:  2  Admit Date:  10/29/2022 10:53 AM    PCP:  VASU Gross  Code Status:  Full Code    Subjective:     C/C:   Chief Complaint   Patient presents with    Abnormal Lab     HGB 5.1     Interval History Status: improved clinically. Patient seen and examined at bedside in the presence of attending during rounds after patient returned from EGD. Patient hemoglobin today is stable is around 8.2. Patient complains of nausea and epigastric pain post EGD as patient had cautery of areas of angiectasia      Patient blood pressure is currently stable and significantly improved 121/55. Patient is on room air, will restart home medications. Nursing notes seen and appreciated    Brief History:     The patient is a 60 y.o.   male with hx of esophageal AC s/p chemoradiation, Hep C, alcoholic liver cirrhosis with portal hypertension and variceal bleeding, with multiple transfusions, who presents withAbnormal Lab (HGB 5.1) after he was told so by Dr. Vi Leicel his Gastroenterologist and is admitted for the management of acute blood loss anemia     Patient reports that he gets paracentesis for ascites once every week, and hemoglobin levels once a week as well. Per patient and notes, patient had a hemoglobin level check on Friday 10/21/2022 which was 6.4 and therefore he was transfused a unit of blood. He was told to check his CBC on Saturday, and was found today to have a hemoglobin of 5.1. Patient was advised to go to the ED immediately for admission and further management. Patient had his last paracentesis yesterday.  Patient reports similar previous episodes, however, he said that this time he did not have any abdominal pain preceding the episode, however he reports dark tarry stools. Patient has a history of esophageal varices, that were previously banded. He also reports that on 90/14 he had some areas cauterized     Patient last endoscopy was 1 week ago which showed small esophageal varices, and an ulcer at the GE junction from the previous variceal banding. Patient also has sliding hiatal hernia, angiectasia and portal hypertensive gastropathy was also appreciated. On September, patient had an EGD, which showed, Multiple angiectasias in gastric fundus and esophagogastric junction presumably related to prior radiation. During admission, paracentesis hemoglobin level of and 5.6. Patient had a soft blood pressure 109/57 followed by 91/55, not tachycardic. Patient was immediately transfused 1 unit hemoglobin, and another unit was also pending. Patient started on octreotide infusion, pantoprazole infusion, as well as ceftriaxone IV as a prophylactic measure for underlying risk of SBP. CT chest abdomen pelvis with IV contrast is planned for 11/03/2022 for follow-up on esophageal adenocarcinoma. Review of Systems:     Review of Systems   Constitutional:  Negative for activity change, appetite change, chills, diaphoresis and fatigue. HENT:  Negative for congestion, ear pain, nosebleeds, sinus pressure, sinus pain and sore throat. Eyes:  Negative for photophobia and visual disturbance. Respiratory:  Negative for apnea, cough, choking, chest tightness, shortness of breath and wheezing. Cardiovascular:  Negative for chest pain, palpitations and leg swelling. Gastrointestinal:  Positive for abdominal distention, abdominal pain and nausea. Negative for blood in stool, constipation, diarrhea and vomiting. Genitourinary:  Negative for difficulty urinating, dysuria and enuresis. Neurological:  Positive for light-headedness. Negative for dizziness, facial asymmetry, weakness, numbness and headaches.    Psychiatric/Behavioral:  Negative for agitation, behavioral problems and confusion. Medications: Allergies:  No Known Allergies    Current Meds:   Scheduled Meds:    midodrine  10 mg Oral 4x daily    ferrous sulfate  325 mg Oral Every Other Day    [START ON 11/1/2022] pantoprazole (PROTONIX) 40 mg injection  40 mg IntraVENous Daily    sodium chloride flush  5-40 mL IntraVENous 2 times per day    cefTRIAXone (ROCEPHIN) IV  1,000 mg IntraVENous Q24H    atorvastatin  20 mg Oral Nightly    lactulose  30 g Oral TID    [Held by provider] nadolol  20 mg Oral Daily    [Held by provider] furosemide  20 mg Oral BID     Continuous Infusions:    sodium chloride      sodium chloride      sodium chloride      sodium chloride 20 mL/hr at 10/29/22 2205    pantoprazole 8 mg/hr (10/31/22 0022)    octreotide (SandoSTATIN) infusion 50 mcg/hr (10/31/22 0019)     PRN Meds: sodium chloride, sodium chloride, midodrine, oxyCODONE, sodium chloride, sodium chloride flush, sodium chloride, ondansetron **OR** ondansetron, polyethylene glycol    Data:     Past Medical History:   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 ([facility] [address] Alcoholic cirrhosis of liver with ascites ([facility] [address] Anemia, Anxiety, Arthritis, Back pain, chronic, Lezama esophagus, BPH (benign prostatic hypertrophy), Cholelithiasis, Cirrhosis ([facility] [address] COVID-19, COVID-19 vaccine series completed, DDD (degenerative disc disease), lumbar, Depression, Esophageal cancer ([facility] [address] Esophageal varices ([facility] [address] Fatty liver, GERD (gastroesophageal reflux disease), Hep C w/o coma, chronic ([facility] [address] History of alcohol abuse, History of blood transfusion, History of colon polyps, History of tobacco abuse, [place] (hard of hearing), Hyperlipidemia, Hypertension, Hyponatremia, Port-A-Cath in place, Portal hypertension ([facility] [address] Sciatica, Secondary esophageal varices ([facility] [address] Shortness of breath, Spinal stenosis, Stomach ulcer, Tubular adenoma of colon, Vitamin D deficiency, and Wears glasses.     Social History:   reports that he quit smoking about 5 years ago. His smoking use included cigarettes. He has a 45.00 pack-year smoking history. He has never used smokeless tobacco. He reports that he does not currently use alcohol. He reports that he does not currently use drugs after having used the following drugs: Cocaine. Frequency: 1.00 time per week. Family History:   Family History   Problem Relation Age of Onset    Cancer Mother         pancreatic    Cancer Father         bone    Diabetes Sister     Asthma Brother        Vitals:  /69   Pulse 63   Temp 97.7 °F (36.5 °C) (Oral)   Resp 16   Ht 5' 10\" (1.778 m)   Wt 208 lb 12.4 oz (94.7 kg)   SpO2 99%   BMI 29.96 kg/m²   Temp (24hrs), Av.7 °F (36.5 °C), Min:97.5 °F (36.4 °C), Max:97.9 °F (36.6 °C)    No results for input(s): POCGLU in the last 72 hours. I/O(24Hr):     Intake/Output Summary (Last 24 hours) at 10/31/2022 7310  Last data filed at 10/31/2022 6771  Gross per 24 hour   Intake 1034.82 ml   Output 300 ml   Net 734.82 ml         Labs:    CBC:   Lab Results   Component Value Date/Time    WBC 8.0 10/31/2022 05:09 AM    RBC 3.27 10/31/2022 05:09 AM    RBC 4.75 2012 11:30 AM    HGB 8.7 10/31/2022 05:09 AM    HCT 28.1 10/31/2022 05:09 AM    MCV 86.1 10/31/2022 05:09 AM    MCH 26.7 10/31/2022 05:09 AM    MCHC 31.0 10/31/2022 05:09 AM    RDW 16.1 10/31/2022 05:09 AM     10/31/2022 05:09 AM     2012 11:30 AM    MPV 7.4 10/31/2022 05:09 AM       Lab Results   Component Value Date/Time    SPECIAL NOT REPORTED 2022 11:28 AM     Lab Results   Component Value Date/Time    CULTURE NO SIGNIFICANT GROWTH 2022 11:00 PM         Radiology:    IR US GUIDED PARACENTESIS    Result Date: 10/28/2022  PROCEDURE: PARACENTESIS WITH IMAGE GUIDANCE US ABDOMEN LIMITED 10/28/2022 HISTORY: ORDERING SYSTEM PROVIDED HISTORY: Alcoholic cirrhosis of liver with ascites ([facility] [address] TECHNOLOGIST PROVIDED HISTORY: Weekly due o amount drained every 2 weeks TECHNIQUE: Informed consent was obtained after a detailed explanation of the procedure including risks, benefits, and alternatives. Universal protocol was followed. A limited ultrasound of the abdomen was performed and demonstrates a large amount of ascites. The right abdomen was prepped and draped in sterile fashion and local anesthesia was achieved with lidocaine. A 5 Pakistani needle sheath was advanced into ascites using realtime ultrasound guidance and paracentesis was performed. The patient tolerated the procedure well. EBL: None FINDINGS: Limited ultrasound of the abdomen demonstrates ascites. A total of 8.1 L of slightly turbid yellow colored fluid was removed. Supplemental albumin was given intravenously     Successful ultrasound-guided paracentesis. IR US GUIDED PARACENTESIS    Result Date: 10/21/2022  PROCEDURE: PARACENTESIS WITH IMAGE GUIDANCE US ABDOMEN LIMITED 10/21/2022 HISTORY: ORDERING SYSTEM PROVIDED HISTORY: Alcoholic cirrhosis of liver with ascites ([facility] [address] TECHNOLOGIST PROVIDED HISTORY: Weekly due o amount drained every 2 weeks TECHNIQUE: Informed consent was obtained after a detailed explanation of the procedure including risks, benefits, and alternatives. Universal protocol was followed. A limited ultrasound of the abdomen was performed and shows large amount of ascites. The left abdomen was prepped and draped in sterile fashion and local anesthesia was achieved with lidocaine. A 5 Pakistani needle sheath was advanced into ascites using realtime ultrasound guidance and paracentesis was performed. The patient tolerated the procedure well. EBL: None FINDINGS: Limited ultrasound of the abdomen demonstrates ascites. A total of 7.9 L of clear yellow fluid was removed. Supplemental albumin was given intravenously. Successful ultrasound-guided paracentesis.      IR US GUIDED PARACENTESIS    Result Date: 10/14/2022  PROCEDURE: PARACENTESIS WITH IMAGE GUIDANCE US ABDOMEN LIMITED 10/14/2022 HISTORY: ORDERING SYSTEM PROVIDED HISTORY: Alcoholic cirrhosis of liver with ascites ([facility] [address] TECHNOLOGIST PROVIDED HISTORY: Weekly due o amount drained every 2 weeks TECHNIQUE: Informed consent was obtained after a detailed explanation of the procedure including risks, benefits, and alternatives. Universal protocol was followed. A limited ultrasound of the abdomen was performed. The right abdomen was prepped and draped in sterile fashion and local anesthesia was achieved with lidocaine. An 5 [place] needle sheath was advanced into ascites and paracentesis was performed. The patient tolerated the procedure well. FINDINGS: Limited ultrasound of the abdomen demonstrates ascites. A total of 9.7 L was removed. Successful ultrasound-guided therapeutic paracentesis. IR US GUIDED PARACENTESIS    Result Date: 10/7/2022  PROCEDURE: PARACENTESIS WITHOUT IMAGE GUIDANCE US ABDOMEN LIMITED 10/7/2022 HISTORY: ORDERING SYSTEM PROVIDED HISTORY: Alcoholic cirrhosis of liver with ascites ([facility] [address] TECHNOLOGIST PROVIDED HISTORY: Weekly due o amount drained every 2 weeks TECHNIQUE: Informed consent was obtained after a explanation of the procedure including risks. Universal protocol was followed. A limited ultrasound of the abdomen was performed. The right abdomen was prepped and draped in sterile fashion, and local anesthesia was achieved with 1% lidocaine. A 5 [place] Zeal[place] needle sheath was advanced into the ascites under direct ultrasound guidance (a sterile probe cover and gel were used), and paracentesis was performed. A total of 10.6 L of yellow fluid was aspirated. IV albumin therapy was initiated. The patient tolerated the procedure well without immediately apparent complication. FINDINGS: Initial limited abdominal ultrasound demonstrated a large amount of ascites.      Successful paracentesis     IR US GUIDED PARACENTESIS    Result Date: 9/30/2022  EXAMINATION: IR US GUIDED PARACENTESIS W IMAGING HISTORY / PRE-OPERATIVE DIAGNOSIS: ORDERING SYSTEM PROVIDED HISTORY: Alcoholic cirrhosis of liver with ascites ([facility] [address] TECHNOLOGIST PROVIDED HISTORY: Weekly due o amount drained every 2 weeks : Dana Starkey ANESTHESIA: Local TECHNIQUE: After obtaining informed consent, the patient was placed in the supine position in bed. The right lateral flank area was cleansed and draped in the usual sterile fashion. A time-out was performed prior to commencing the procedure. 1% Lidocaine was used for local anesthesia. Next, a 5 [place] Lorrie Yueh needle catheter  was introduced into the ascites fluid collection. As much fluid was removed as possible by vacuum container bottles. A total of 10.7 L of cloudy light stephanie colored fluid was removed. A total of 1300 cc of the cloudy light stephanie colored fluid was sent to the lab for evaluation. 75 g IV albumin was given. COMPARISON: Paracentesis dated 09/23/2022 FINDINGS: Technically successful ultrasound guided large volume paracentesis ESTIMATED BLOOD LOSS: Less than 50 SPECIMENS: A total of 10.7 L of cloudy light stephanie colored fluid was obtained. Technically successful US guided large volume paracentesis with removal of 10.7 L of fluid as described above. The patient received 75 grams of Albumin IV during and post procedure. COMPLICATIONS: The patient tolerated the procedure well without any immediate complications. Physical Examination:        Physical Exam  Vitals and nursing note reviewed. Constitutional:       General: He is not in acute distress. Appearance: He is not toxic-appearing. HENT:      Head: Normocephalic. Eyes:      Extraocular Movements: Extraocular movements intact. Pupils: Pupils are equal, round, and reactive to light. Cardiovascular:      Rate and Rhythm: Normal rate and regular rhythm. Pulses: Normal pulses. Heart sounds: Normal heart sounds. Pulmonary:      Effort: Pulmonary effort is normal.      Breath sounds: Normal breath sounds.    Abdominal: General: Bowel sounds are normal. There is distension. Palpations: Abdomen is soft. There is shifting dullness and fluid wave. Musculoskeletal:      Cervical back: Normal range of motion and neck supple. Skin:     General: Skin is warm. Coloration: Skin is pale. Neurological:      General: No focal deficit present. Mental Status: He is alert and oriented to person, place, and time. Mental status is at baseline. Psychiatric:         Mood and Affect: Mood normal.         Behavior: Behavior normal.         Assessment:        Primary Problem  GI bleed    Active Hospital Problems    Diagnosis Date Noted    GI bleed [K92.2] 09/13/2022     Priority: Medium   Patient is a 50-year-old male with history mainly remarkable for esophageal adenocarcinoma status post chemoradiation, alcoholic liver cirrhosis with portal hypertension and esophageal varices who presents with a picture of acute blood loss anemia secondary to GI bleed in the setting of dark tarry stools. Patient had a hemoglobin around 6 on 10/21/2022 , transfused unit of PRBCs and today had a hemoglobin of 5.1 indicating acute significant GI bleed. Patient is admitted for further management and is received of 3 units of blood at the moment. GI consulted, patient is vitally stable for now. Prognosis guarded  Plan:        Acute anemia most likely secondary to upper GI blood loss in the setting of varices and telangiectasias  -GI consulted, Dr. Roderick Maldonado. Possible EGD   Patient received 3 packed RBC, H&H every 6 hours  HB 8.2  Patient on octreotide infusion, PPI infusion. Continue nadolol, continue midodrine. .  Please keep close eye on vitals  Patient n.p.o.     Liver cirrhosis secondary to chronic alcoholism  patient on furosemide 20 mg twice daily, continue nadolol  Continue lactulose 30 mg 3 times daily  , continue midodrine      Hyperlipidemia   Continue atorvastatin        Reese Thomas MD  10/31/2022  7:22 AM

## 2022-10-31 NOTE — PROGRESS NOTES
Physical Therapy        Physical Therapy Cancel Note      DATE: 10/31/2022    NAME: Yaneth Akins  MRN: [id number]   : 1960      Patient not seen this date for Physical Therapy due to:    10- at 80- pt out of room at surgery for EGD. Will check in p.m.       Electronically signed by Chloe Tim PT on 10/31/2022 at 11:10 AM

## 2022-10-31 NOTE — ACP (ADVANCE CARE PLANNING)
Advance Care Planning     Advance Care Planning (ACP) Physician/NP/PA Conversation    Date of Conversation: 10/29/2022  Conducted with: Patient with Decision Making Capacity    Healthcare Decision Maker:      Primary Decision Maker: Andres Sanchez - [phone]    Click here to complete [address] including selection of the Healthcare Decision Maker Relationship (ie \"Primary\")  Today we documented Decision Maker(s). The patient will provide ACP documents. Care Preferences:    Hospitalization: \"If your health worsens and it becomes clear that your chance of recovery is unlikely, what would be your preference regarding hospitalization? \"  The patient would prefer hospitalization. Ventilation: \"If you were unable to breath on your own and your chance of recovery was unlikely, what would be your preference about the use of a ventilator (breathing machine) if it was available to you? \"  The patient would desire the use of a ventilator. Resuscitation: \"In the event your heart stopped as a result of an underlying serious health condition, would you want attempts made to restart your heart, or would you prefer a natural death? \"  Yes, attempt to resuscitate.     benefit/burden of treatment options, ventilation preferences, hospitalization preferences, and resuscitation preferences    Conversation Outcomes / Follow-Up Plan:  ACP in process - information provided, considering goals and options  Reviewed DNR/DNI and patient elects Full Code (Attempt Resuscitation)    Length of Voluntary ACP Conversation in minutes:  <16 minutes (Non-Billable)    MASSIMO David - CNP

## 2022-10-31 NOTE — ANESTHESIA POSTPROCEDURE EVALUATION
POST- ANESTHESIA EVALUATION       Pt Name: Padmini Gilbert  MRN: [id number]  YOB: 1960  Date of evaluation: 10/31/2022  Time:  12:49 PM      /64   Pulse 58   Temp 97.8 °F (36.6 °C) (Oral)   Resp 16   Ht 5' 10\" (1.778 m)   Wt 208 lb 12.4 oz (94.7 kg)   SpO2 96%   BMI 29.96 kg/m²      Consciousness Level  Awake  Cardiopulmonary Status  Stable  Pain Adequately Treated YES  Nausea / Vomiting  NO  Adequate Hydration  YES  Anesthesia Related Complications NONE      Electronically signed by Rito Cox MD on 10/31/2022 at 12:49 PM       Department of Anesthesiology  Postprocedure Note    Patient: Padmini Gilbert  MRN: [id number]  YOB: 1960  Date of evaluation: 10/31/2022      Procedure Summary     Date: 10/31/22 Room / Location: [address] [place] [address] 01 / [address] [place] [address]    Anesthesia Start: 0908 Anesthesia Stop: 0941    Procedure: EGD with APC (Esophagus) Diagnosis:       Anemia, unspecified type      (Anemia, unspecified type [D64.9])    Surgeons: Judith De La Torre MD Responsible Provider: Rito Cox MD    Anesthesia Type: General ASA Status: 3          Anesthesia Type: General    Yen Phase I: Yen Score: 9    Yen Phase II:        Anesthesia Post Evaluation

## 2022-10-31 NOTE — OP NOTE
ESOPHAGOGASTRODUODENOSCOPY   ( EGD )  DATE OF PROCEDURE: 10/31/2022     SURGEON: Ana Laura Flores MD    ASSISTANT: None    PREOPERATIVE DIAGNOSIS: Patient had melanotic stools. Known to have cirrhosis of the liver, portal hypertension, varices. Procedure for prompt evaluate upper GI source of bleeding. POSTOPERATIVE DIAGNOSIS: Severe angio ectasia of hiatal hernia and a GE junction. May be secondary to radiation therapy/portal hypertensive gastropathy. Oozing of blood from this area. OPERATION: Upper GI endoscopy with control of bleeding/APC cauterization    ANESTHESIA: MAC    ESTIMATED BLOOD LOSS: None    COMPLICATIONS: None. SPECIMENS:  Was Not Obtained    HISTORY: The patient is a 60y.o. year old male with history of above preop diagnosis. I recommended esophagogastroduodenoscopy with possible biopsy and I explained the risk, benefits, expected outcome, and alternatives to the procedure. Risks included but are not limited to bleeding, infection, respiratory distress, hypotension, and perforation of the esophagus, stomach, or duodenum. Patient understands and is in agreement. PROCEDURE: The patient was given IV conscious sedation. The patient's SPO2 remained above 90% throughout the procedure. Cetacaine spray given. Patient placed in left lateral position. Olympus  videogastroscope was inserted orally under vision into the esophagus without difficulty and advanced into the stomach then through the pylorus up to the second part of duodenum. Findings:    Retropharyngeal area was grossly normal appearing    Esophagus: abnormal: At about 35 cm from the teeth, oozing of blood seen from angio ectasia. Patient has 3 to 4 cm long hiatal hernia and that there is severe annular ectasia of the mucosa in the hiatal hernia with some oozing of blood. Using APC with esophageal setting, I did cauterize this angio ectasia.   Patient was constantly retching and belching during this procedure limiting the efficacy. Stomach:    Fundus and Cardia Examined in Retroflexed View: normal, signs of portal hypertensive gastropathy. Body: normal, mild mucosal signs suggestive of portal hypertensive gastropathy    Antrum: normal    Duodenum:     Descending: normal    Bulb: normal    While withdrawing the scope the above findings were verified and the scope was removed. The patient has tolerated the procedure without unusual events. Recommendations/Plan: To keep on PPI therapy. F/U In Office as instructed  Discussed with the family  Will discuss interventional radiology service regarding possible TIPS procedure.                  Electronically signed by Sean Baker MD  on 10/31/2022 at 9:32 AM

## 2022-11-01 VITALS
HEIGHT: 70 IN | HEART RATE: 58 BPM | BODY MASS INDEX: 31.88 KG/M2 | SYSTOLIC BLOOD PRESSURE: 117 MMHG | DIASTOLIC BLOOD PRESSURE: 76 MMHG | WEIGHT: 222.66 LBS | TEMPERATURE: 98 F | OXYGEN SATURATION: 100 % | RESPIRATION RATE: 18 BRPM

## 2022-11-01 LAB
ABSOLUTE EOS #: 0.07 K/UL (ref 0–0.4)
ABSOLUTE LYMPH #: 0.44 K/UL (ref 1–4.8)
ABSOLUTE MONO #: 1.04 K/UL (ref 0.1–1.3)
AMMONIA: 55 UMOL/L (ref 16–60)
ANION GAP SERPL CALCULATED.3IONS-SCNC: 10 MMOL/L (ref 9–17)
BASOPHILS # BLD: 0 % (ref 0–2)
BASOPHILS ABSOLUTE: 0 K/UL (ref 0–0.2)
BUN BLDV-MCNC: 11 MG/DL (ref 8–23)
CALCIUM SERPL-MCNC: 8.3 MG/DL (ref 8.6–10.4)
CHLORIDE BLD-SCNC: 102 MMOL/L (ref 98–107)
CO2: 20 MMOL/L (ref 20–31)
CREAT SERPL-MCNC: 0.75 MG/DL (ref 0.7–1.2)
EOSINOPHILS RELATIVE PERCENT: 1 % (ref 0–4)
GFR SERPL CREATININE-BSD FRML MDRD: >60 ML/MIN/1.73M2
GLUCOSE BLD-MCNC: 151 MG/DL (ref 70–99)
HCT VFR BLD CALC: 29.3 % (ref 41–53)
HEMOGLOBIN: 8.9 G/DL (ref 13.5–17.5)
INR BLD: 1.3
LYMPHOCYTES # BLD: 6 % (ref 24–44)
MCH RBC QN AUTO: 27.2 PG (ref 26–34)
MCHC RBC AUTO-ENTMCNC: 30.4 G/DL (ref 31–37)
MCV RBC AUTO: 89.3 FL (ref 80–100)
MONOCYTES # BLD: 14 % (ref 1–7)
MORPHOLOGY: ABNORMAL
PDW BLD-RTO: 16.9 % (ref 11.5–14.9)
PLATELET # BLD: 111 K/UL (ref 150–450)
PMV BLD AUTO: 7.2 FL (ref 6–12)
POTASSIUM SERPL-SCNC: 4.2 MMOL/L (ref 3.7–5.3)
PROTHROMBIN TIME: 16.3 SEC (ref 11.8–14.6)
RBC # BLD: 3.28 M/UL (ref 4.5–5.9)
SEG NEUTROPHILS: 79 % (ref 36–66)
SEGMENTED NEUTROPHILS ABSOLUTE COUNT: 5.85 K/UL (ref 1.3–9.1)
SODIUM BLD-SCNC: 132 MMOL/L (ref 135–144)
WBC # BLD: 7.4 K/UL (ref 3.5–11)

## 2022-11-01 PROCEDURE — 85610 PROTHROMBIN TIME: CPT

## 2022-11-01 PROCEDURE — 36415 COLL VENOUS BLD VENIPUNCTURE: CPT

## 2022-11-01 PROCEDURE — 2580000003 HC RX 258: Performed by: INTERNAL MEDICINE

## 2022-11-01 PROCEDURE — 82140 ASSAY OF AMMONIA: CPT

## 2022-11-01 PROCEDURE — 6370000000 HC RX 637 (ALT 250 FOR IP): Performed by: INTERNAL MEDICINE

## 2022-11-01 PROCEDURE — 99232 SBSQ HOSP IP/OBS MODERATE 35: CPT | Performed by: INTERNAL MEDICINE

## 2022-11-01 PROCEDURE — 6360000002 HC RX W HCPCS: Performed by: INTERNAL MEDICINE

## 2022-11-01 PROCEDURE — 6360000002 HC RX W HCPCS

## 2022-11-01 PROCEDURE — 80048 BASIC METABOLIC PNL TOTAL CA: CPT

## 2022-11-01 PROCEDURE — C9113 INJ PANTOPRAZOLE SODIUM, VIA: HCPCS | Performed by: INTERNAL MEDICINE

## 2022-11-01 PROCEDURE — 6370000000 HC RX 637 (ALT 250 FOR IP): Performed by: STUDENT IN AN ORGANIZED HEALTH CARE EDUCATION/TRAINING PROGRAM

## 2022-11-01 PROCEDURE — 85025 COMPLETE CBC W/AUTO DIFF WBC: CPT

## 2022-11-01 PROCEDURE — 6370000000 HC RX 637 (ALT 250 FOR IP)

## 2022-11-01 RX ORDER — HEPARIN SODIUM (PORCINE) LOCK FLUSH IV SOLN 100 UNIT/ML 100 UNIT/ML
300 SOLUTION INTRAVENOUS PRN
Status: DISCONTINUED | OUTPATIENT
Start: 2022-11-01 | End: 2022-11-01 | Stop reason: HOSPADM

## 2022-11-01 RX ADMIN — FUROSEMIDE 20 MG: 20 TABLET ORAL at 09:42

## 2022-11-01 RX ADMIN — ONDANSETRON 4 MG: 4 TABLET, ORALLY DISINTEGRATING ORAL at 11:08

## 2022-11-01 RX ADMIN — SODIUM CHLORIDE 8 MG/HR: 9 INJECTION, SOLUTION INTRAVENOUS at 00:47

## 2022-11-01 RX ADMIN — MIDODRINE HYDROCHLORIDE 10 MG: 10 TABLET ORAL at 09:41

## 2022-11-01 RX ADMIN — OCTREOTIDE ACETATE 50 MCG/HR: 1000 INJECTION, SOLUTION INTRAVENOUS; SUBCUTANEOUS at 11:03

## 2022-11-01 RX ADMIN — MIDODRINE HYDROCHLORIDE 10 MG: 10 TABLET ORAL at 13:38

## 2022-11-01 RX ADMIN — SPIRONOLACTONE 25 MG: 25 TABLET ORAL at 09:41

## 2022-11-01 RX ADMIN — NADOLOL 20 MG: 20 TABLET ORAL at 09:42

## 2022-11-01 RX ADMIN — HEPARIN 300 UNITS: 100 SYRINGE at 15:42

## 2022-11-01 RX ADMIN — OCTREOTIDE ACETATE 50 MCG/HR: 1000 INJECTION, SOLUTION INTRAVENOUS; SUBCUTANEOUS at 00:48

## 2022-11-01 RX ADMIN — OXYCODONE HYDROCHLORIDE 2.5 MG: 5 TABLET ORAL at 00:52

## 2022-11-01 RX ADMIN — SODIUM CHLORIDE 8 MG/HR: 9 INJECTION, SOLUTION INTRAVENOUS at 11:03

## 2022-11-01 ASSESSMENT — ENCOUNTER SYMPTOMS
SHORTNESS OF BREATH: 0
VOMITING: 0
ABDOMINAL DISTENTION: 1
RHINORRHEA: 0
CONSTIPATION: 0
DIARRHEA: 0
NAUSEA: 0
ABDOMINAL PAIN: 0
SORE THROAT: 0

## 2022-11-01 ASSESSMENT — PAIN SCALES - GENERAL: PAINLEVEL_OUTOF10: 8

## 2022-11-01 NOTE — PLAN OF CARE
Problem: Discharge Planning  Goal: Discharge to home or other facility with appropriate resources  11/1/2022 0415 by Army Stuart RN  Outcome: Progressing     Problem: Safety - Adult  Goal: Free from fall injury  11/1/2022 0415 by Army Stuart RN  Outcome: Progressing     Problem: Safety - Adult  Goal: Free from fall injury  11/1/2022 0415 by Army Stuart RN  Outcome: Progressing     Problem: ABCDS Injury Assessment  Goal: Absence of physical injury  11/1/2022 0415 by Army Stuart RN  Outcome: Progressing     Problem: Cardiovascular - Adult  Goal: Maintains optimal cardiac output and hemodynamic stability  11/1/2022 0415 by Army Stuart RN  Outcome: Progressing     Problem: Cardiovascular - Adult  Goal: Absence of cardiac dysrhythmias or at baseline  11/1/2022 0415 by Army Stuart RN  Outcome: Progressing     Problem: Metabolic/Fluid and Electrolytes - Adult  Goal: Electrolytes maintained within normal limits  11/1/2022 0415 by Army Stuart RN  Outcome: Progressing     Problem: Metabolic/Fluid and Electrolytes - Adult  Goal: Hemodynamic stability and optimal renal function maintained  11/1/2022 0415 by Army Stuart RN  Outcome: Progressing     Problem: Pain  Goal: Verbalizes/displays adequate comfort level or baseline comfort level  11/1/2022 0415 by Army Stuart RN  Outcome: Progressing

## 2022-11-01 NOTE — PROGRESS NOTES
11/01/22 1249   Encounter Summary   Encounter Overview/Reason   Encounter   Service Provided For: Patient   Referral/Consult From: Rounding   Last Encounter  11/01/22   Complexity of Encounter Low   Rituals, Rites and Sacraments   Type Anointing  (Declined per Fr Grimm 11-1-22)

## 2022-11-01 NOTE — DISCHARGE INSTR - COC
Continuity of Care Form    Patient Name: Maryuri Arredondo   :  [id number]  MRN:  [id number]    Admit date:  10/29/2022  Discharge date:  2022    Code Status Order: Full Code   Advance Directives:   [facility] Documentation       Date/Time Healthcare Directive Type of Healthcare Directive Copy in [facility] [address] Agent's Name Healthcare Agent's Phone Number    10/31/22 0750 No, patient does not have an advance directive for healthcare treatment -- -- -- -- --            Admitting Physician:  Tamiko Robertson MD  PCP: VASU Montaño    Discharging Nurse: [facility] Unit/Room#: 2115/2115-01  Discharging Unit Phone Number: [phone]    Emergency Contact:   Extended Emergency Contact Information  Primary Emergency Contact: Liz Adrian Howard Phone: [phone]  Work Phone: [phone]  Mobile Phone: [phone]  Relation: Other    Past Surgical History:  Past Surgical History:   Procedure Laterality Date    BUNIONECTOMY      twice on right side    BUNIONECTOMY Left     CARPAL TUNNEL RELEASE Right     COLONOSCOPY      at age 35    COLONOSCOPY  10/05/2016    polyps-pathology tubular adenoma, and abnormal looking mucosa right colon-pathology-tubular adenoma    COLONOSCOPY N/A 2018    COLONOSCOPY POLYPECTOMY COLD BIOPSY performed by Tanvi Lombardi MD at [address] [facility]  2018    Small polyp in the sigmoid colon and excised with biopsy forceps--tubular adenoma    COLONOSCOPY N/A 2022    COLONOSCOPY POLYPECTOMY performed by Tanvi Lombardi MD at [facility], COLON, DIAGNOSTIC      EGD    IR PORT PLACEMENT EQUAL OR GREATER THAN 5 YEARS  2021    IR PORT PLACEMENT EQUAL OR GREATER THAN 5 YEARS 2021 [facility] PROCEDURES    KNEE SURGERY Left     cyst removed    NASAL SEPTUM SURGERY      NERVE BLOCK Right 2020    NERVE BLOCK RIGHT CERVICAL STEROID INJECTION  C3-C6 performed by Armen Sherman Shanique Choudhury MD at [address]  01/04/2016    steroid injection C7 T1    OTHER SURGICAL HISTORY  11/21/2016    Bilateral Lumbar CACHORRO L4-L5 injections    OTHER SURGICAL HISTORY  12/19/2016    lumbar steroid injection    OTHER SURGICAL HISTORY  09/28/2018    BILATERAL L5 CACHORRO (N/A Back)    OTHER SURGICAL HISTORY Right 11/23/2020    cervical injection    PAIN MANAGEMENT PROCEDURE Left 07/09/2020    EPIDURAL STEROID INJECTION LEFT L4 L5 performed by Marcus Izaguirre MD at [facility] Left 07/20/2020    LEFT L4 L5 EPIDURAL STEROID INJECTION performed by Marcus Izaguirre MD at [facility] Bilateral 08/17/2020    LUMBAR FACET BILATERAL L2-L5 performed by Marcus Izaguirre MD at [facility] Bilateral 12/07/2020    NERVE BLOCK BILATERAL LUMBAR MEDIAL BRANCH L2-L5 performed by Marcus Izaguirre MD at [address] [place] [address] [facility] AA&/STRD TFRML EPI LUMBAR/SACRAL 1 LEVEL Bilateral 09/06/2018    BILATERAL L5 CACHORRO performed by Marcus [facility]aguirre MD at [facility] AA&/STRD TFRML EPI LUMBAR/SACRAL 1 LEVEL N/A 09/28/2018    BILATERAL L5 CACHORRO performed by Marcus Izaguirre MD at [address] [place] [address] N/CARPAL TUNNEL SURG Right 08/29/2017    CARPAL TUNNEL RELEASE RIGHT performed by Javier Gilliland MD at [address] [place] [address] N/CARPAL TUNNEL SURG Left 10/31/2017    CARPAL TUNNEL RELEASE performed by Javier Gilliland MD at [address] [place] [address] 12/29/2020    EGD BIOPSY performed by Goyo Gutierrez MD at [address] [place] [address] 02/02/2021    EGD BIOPSY and spot marking performed by Keith Rosenberg MD at [address] [place] [address] 02/12/2021    ENDOSCOPIC ULTRASOUND, EGD performed by Brian Marroquin MD at [place] [address]  02/12/2021    EGD DIAGNOSTIC ONLY performed by Brian Marroquin MD at [place] [address] GASTROINTESTINAL ENDOSCOPY N/A 08/31/2021    EGD BIOPSY performed by Deborah Ingram MD at [address] [place] [address] 01/21/2022    EGD BIOPSY performed by Deborah Ingram MD at [address] [place] [address] N/A 04/15/2022    EGD ESOPHAGOGASTRODUODENOSCOPY performed by Bridget Bryant MD at [address] [place] [address] 06/06/2022    EGD BAND LIGATION performed by Galo Ha MD at [address] [place] [address] N/A 06/09/2022    EGD ESOPHAGOGASTRODUODENOSCOPY performed by Galo Ha MD at [address] [place] [address] N/A 9/14/2022    EGD ESOPHAGOGASTRODUODENOSCOPY ENDOSCOPIC APC AT GE JUNCTION performed by Deysi Nieves MD at [address] [place] [address] N/A 10/19/2022    EGD BIOPSY performed by Deborah Ingram MD at [address] [place] [address] 10/31/2022    EGD with APC performed by Deborah Ingram MD at [facility]. [facility]. EXTRACTION         Immunization History:   Immunization History   Administered Date(s) Administered    COVID-19, MODERNA BLUE border, Primary or Immunocompromised, (age 12y+), IM, 100 mcg/0.5mL 05/20/2021, 06/22/2021    Hepatitis A 09/20/2016, 03/29/2017    Hepatitis B (Recombivax HB) 09/20/2016, 10/19/2016, 03/29/2017    Influenza 11/29/2012    Influenza, AFLURIA (age 1 yrs+), FLUZONE, (age 10 mo+), MDV, 0.5mL 09/13/2017, 01/24/2019    Influenza, FLUARIX, FLULAVAL, FLUZONE (age 10 mo+) AND AFLURIA, (age 1 y+), PF, 0.5mL 09/13/2016, 12/30/2020, 12/23/2021    PPD Test 07/25/2016, 04/23/2022    Pneumococcal Polysaccharide ([id number]) 11/29/2012, 07/25/2016    Tdap (Boostrix, Adacel) 04/06/2017, 06/15/2022       Active Problems:  Patient Active Problem List   Diagnosis Code    Back pain, chronic M54.9, G89.29    Hearing difficulty H91.90    GERD (gastroesophageal reflux disease) K21.9    Cervical radicular pain M54.12    Alcohol withdrawal syndrome without complication (HCC) V03.800    Hypomagnesemia E83.42    Chronic viral hepatitis B without delta agent and without coma (HCC) B18.1    Calculus of gallbladder without cholecystitis K80.20    Hep C w/o coma, chronic (HCC) B18.2    Fatty liver K76.0    Psychophysiologic insomnia F51.04    Cirrhosis (HCC) K74.60    Hepatic cirrhosis (HCC) K74.60    Vertebrogenic low back pain M54.51    DDD (degenerative disc disease), lumbar M51.36    Depression F32. A    Tubular adenoma of colon D12.6    History of colon polyps Z86.010    Gynecomastia, male N62    Lumbar radiculitis M54.16    Lumbar disc herniation M51.26    Tinnitus H93.19    Eustachian tube dysfunction H69.80    Ganglion cyst M67.40    Carpal tunnel syndrome of right wrist G56.01    History of hepatitis C Z86.19    Vitamin D deficiency E55.9    Pure hypercholesterolemia E78.00    Hypokalemia E87.6    Essential hypertension I10    Recurrent major depressive disorder in partial remission (HCC) F33.41    S/P epidural steroid injection Z92.241    Elevated LFTs R79.89    Seasonal allergies J30.2    Lumbar facet arthropathy M47.816    Cervical facet syndrome M47.812    Acute gastrointestinal bleeding K92.2    Thrombocytopenia (HCC) D69.6    Hepatitis C virus infection resolved after antiviral drug therapy Z86.19    Gastrointestinal hemorrhage with melena K92.1    Alcohol abuse F10.10    Altered mental status R41.82    Hypocalcemia E83.51    Hypophosphatemia E83.39    Malignant neoplasm of lower third of esophagus (HCC) C15.5    COVID-19 U07.1    Anxiety F41.9    Current smoker F17.200    Severe comorbid illness R69    Gait instability R26.81    Abnormal findings on diagnostic imaging of spine R93.7    Cervical spinal stenosis M48.02    Spinal stenosis of lumbar region with neurogenic claudication M48.062    Low hemoglobin D64.9    Symptomatic anemia, microcytic, acute D64.9    Hypotension I95.9    Former smoker, 50+ pack years, quit 2016 Z87.891 HLD (hyperlipidemia) E78.5    Esophageal adenocarcinoma (HCC) C15.9    Anemia D64.9    Acute kidney injury ([facility] N17.9    Ascites R18.8    Shortness of breath R06.02    Drop in hemoglobin R71.0    Portal hypertension (HCC) K76.6    Secondary esophageal varices (HCC) I85.10    Esophageal polyp K22.81    Acute kidney failure, unspecified (HCC) N17.9    Muscle weakness (generalized) M62.81    Other abnormalities of gait and mobility R26.89    GI bleed K92.2    Goals of care, counseling/discussion Z71.89    DNR (do not resuscitate) discussion Z71.    ACP (advance care planning) Z71.89    Palliative care encounter Z51.5       Isolation/Infection:   Isolation            No Isolation          Patient Infection Status       Infection Onset Added Last Indicated Last Indicated By Review Planned Expiration Resolved Resolved By    None active    Resolved    COVID-19 (Rule Out) 22 COVID-19 & Influenza Combo (Ordered)   22 Rule-Out Test Resulted    COVID-19 (Rule Out) 22 COVID-19, Rapid (Ordered)   22 Rule-Out Test Resulted    COVID-19 (Rule Out) 22 COVID-19 & Influenza Combo (Ordered)   22 Rule-Out Test Resulted    COVID-19 21 COVID-19   21     COVID-19 (Rule Out) 21 COVID-19 (Ordered)   21 Rule-Out Test Resulted    COVID-19 (Rule Out) 20 COVID-19 (Ordered)   20 Rule-Out Test Resulted    COVID-19 (Rule Out) 20 COVID-19 (Ordered)   20 Rule-Out Test Resulted            Nurse Assessment:  Last Vital Signs: /70   Pulse 66   Temp 98 °F (36.7 °C) (Oral)   Resp 18   Ht 5' 10\" (1.778 m)   Wt 222 lb 10.6 oz (101 kg)   SpO2 99%   BMI 31.95 kg/m²     Last documented pain score (0-10 scale): Pain Level: 8  Last Weight:   Wt Readings from Last 1 Encounters:   22 222 lb 10.6 oz (101 kg)     Mental Status: oriented, alert, coherent, logical, thought processes intact, and able to concentrate and follow conversation    IV Access:  - Central Venous Catheter  - site  right and subclavian, insertion date: 10/07/2022    Nursing Mobility/ADLs:  Walking   Independent  Transfer  Independent  Bathing  Independent  Dressing  Independent  Toileting  Independent  Feeding  [facility] Delivery   whole    Wound Care Documentation and Therapy:        Elimination:  Continence: Bowel: Yes  Bladder: Yes  Urinary Catheter: None   Colostomy/Ileostomy/Ileal Conduit: No       Date of Last BM: 11/1/2022    Intake/Output Summary (Last 24 hours) at 11/1/2022 0810  Last data filed at 10/31/2022 1805  Gross per 24 hour   Intake 250 ml   Output 350 ml   Net -100 ml     I/O last 3 completed shifts: In: 1284.8 [P.O.:200; I.V.:1084.8]  Out: 650 [Urine:650]    Safety Concerns:     None    Impairments/Disabilities:      None    Nutrition Therapy:  Current Nutrition Therapy:   - Oral Diet:  Low Sodium (2gm)    Routes of Feeding: Oral  Liquids: No Restrictions  Daily Fluid Restriction: no  Last Modified Barium Swallow with Video (Video Swallowing Test): not done    Treatments at the Time of Hospital Discharge:   Respiratory Treatments: n/a  Oxygen Therapy:  is not on home oxygen therapy. Ventilator:    - No ventilator support    Rehab Therapies: Physical Therapy and Occupational Therapy  Weight Bearing Status/Restrictions: Other Medical Equipment (for information only, NOT a DME order): Other Treatments: Skilled Nursing assessment and monitoring. Medication education and monitoring per protocol.        Patient's personal belongings (please select all that are sent with patient):  Nata    RN SIGNATURE:  Electronically signed by Janice Toledo RN on 11/1/22 at 2:42 PM EDT    CASE MANAGEMENT/SOCIAL WORK SECTION    Inpatient Status Date: 10/29/22    Readmission Risk Assessment Score:  Readmission Risk Risk of Unplanned Readmission:  31           Discharging to Facility/ [address] [place] [address]  Phone [phone]  Fax  [phone]    Dialysis Facility (if applicable)   Name:  Address:  Dialysis Schedule:  Phone:  Fax:    / signature: Electronically signed by Ema Angulo RN on 11/1/22 at 8:11 AM EDT    PHYSICIAN SECTION    Prognosis: Good    Condition at Discharge: Stable    Rehab Potential (if transferring to Rehab): Good    Recommended Labs or Other Treatments After Discharge:     Physician Certification: I certify the above information and transfer of Heidi Quinteros  is necessary for the continuing treatment of the diagnosis listed and that he requires Home Care for greater 30 days.      Update Admission H&P: No change in H&P    PHYSICIAN SIGNATURE:  Electronically signed by Cris Hatchet, MD on 11/1/22 at 10:26 AM EDT

## 2022-11-01 NOTE — PROGRESS NOTES
Age of Onset    Cancer Mother         pancreatic    Cancer Father         bone    Diabetes Sister     Asthma Brother        Social History     Socioeconomic History    Marital status: Single     Spouse name: Not on file    Number of children: Not on file    Years of education: Not on file    Highest education level: Not on file   Occupational History    Not on file   Tobacco Use    Smoking status: Former     Packs/day: 1.00     Years: 45.00     Pack years: 45.00     Types: Cigarettes     Quit date: 2017     Years since quittin.7    Smokeless tobacco: Never   Vaping Use    Vaping Use: Never used   Substance and Sexual Activity    Alcohol use: Not Currently     Comment: Quit alcohol in 2019- heavier drinking prior to quitting    Drug use: Not Currently     Frequency: 1.0 times per week     Types: Cocaine     Comment: Cocaine- stopped spring 2016    Sexual activity: Yes     Partners: Female   Other Topics Concern    Not on file   Social History Narrative     in the past, retired     Social Determinants of Health     Financial Resource Strain: Low Risk     Difficulty of Paying Living Expenses: Not hard at all   Food Insecurity: No Food Insecurity    Worried About [address] in the Last Year: Never true    [address]  [employer] in the Last Year: Never true   Transportation Needs: Not on file   Physical Activity: Inactive    Days of Exercise per Week: 0 days    Minutes of Exercise per Session: 0 min   Stress: Not on file   Social Connections: Not on file   Intimate Partner Violence: Not on file   Housing Stability: Not on file       Vitals:  /76   Pulse 58   Temp 98 °F (36.7 °C) (Oral)   Resp 18   Ht 5' 10\" (1.778 m)   Wt 222 lb 10.6 oz (101 kg)   SpO2 100%   BMI 31.95 kg/m²   Temp (24hrs), Av.8 °F (36.6 °C), Min:97.6 °F (36.4 °C), Max:98 °F (36.7 °C)    No results for input(s): POCGLU in the last 72 hours. I/O (24Hr):     Intake/Output Summary (Last 24 hours) at 2022 Vincent Liz filed at 10/31/2022 1805  Gross per 24 hour   Intake 150 ml   Output 350 ml   Net -200 ml       Labs:      CBC:   Lab Results   Component Value Date/Time    WBC 7.4 11/01/2022 08:45 AM    RBC 3.28 11/01/2022 08:45 AM    RBC 4.75 04/19/2012 11:30 AM    HGB 8.9 11/01/2022 08:45 AM    HCT 29.3 11/01/2022 08:45 AM    MCV 89.3 11/01/2022 08:45 AM    MCH 27.2 11/01/2022 08:45 AM    MCHC 30.4 11/01/2022 08:45 AM    RDW 16.9 11/01/2022 08:45 AM     11/01/2022 08:45 AM     04/19/2012 11:30 AM    MPV 7.2 11/01/2022 08:45 AM     CBC with Differential:    Lab Results   Component Value Date/Time    WBC 7.4 11/01/2022 08:45 AM    RBC 3.28 11/01/2022 08:45 AM    RBC 4.75 04/19/2012 11:30 AM    HGB 8.9 11/01/2022 08:45 AM    HCT 29.3 11/01/2022 08:45 AM     11/01/2022 08:45 AM     04/19/2012 11:30 AM    MCV 89.3 11/01/2022 08:45 AM    MCH 27.2 11/01/2022 08:45 AM    MCHC 30.4 11/01/2022 08:45 AM    RDW 16.9 11/01/2022 08:45 AM    NRBC 1 06/03/2022 06:20 AM    METASPCT 2 10/30/2022 05:55 AM    LYMPHOPCT 6 11/01/2022 08:45 AM    MONOPCT 14 11/01/2022 08:45 AM    MYELOPCT 1 06/01/2021 08:25 AM    BASOPCT 0 11/01/2022 08:45 AM    MONOSABS 1.04 11/01/2022 08:45 AM    LYMPHSABS 0.44 11/01/2022 08:45 AM    EOSABS 0.07 11/01/2022 08:45 AM    BASOSABS 0.00 11/01/2022 08:45 AM    DIFFTYPE NOT REPORTED 02/02/2022 09:35 AM     Hemoglobin/Hematocrit:    Lab Results   Component Value Date/Time    HGB 8.9 11/01/2022 08:45 AM    HCT 29.3 11/01/2022 08:45 AM     CMP:    Lab Results   Component Value Date/Time     11/01/2022 08:45 AM    K 4.2 11/01/2022 08:45 AM     11/01/2022 08:45 AM    CO2 20 11/01/2022 08:45 AM    BUN 11 11/01/2022 08:45 AM    CREATININE 0.75 11/01/2022 08:45 AM    GFRAA >60 09/28/2022 01:33 PM    LABGLOM >60 11/01/2022 08:45 AM    GLUCOSE 151 11/01/2022 08:45 AM    GLUCOSE 65 04/19/2012 11:30 AM    PROT 5.3 10/28/2022 04:31 PM    LABALBU 3.6 10/28/2022 04:31 PM    LABALBU 4.7 04/19/2012 11:30 AM    CALCIUM 8.3 11/01/2022 08:45 AM    BILITOT 1.0 10/28/2022 04:31 PM    ALKPHOS 87 10/28/2022 04:31 PM    AST 23 10/28/2022 04:31 PM    ALT 10 10/28/2022 04:31 PM     BMP:    Lab Results   Component Value Date/Time     11/01/2022 08:45 AM    K 4.2 11/01/2022 08:45 AM     11/01/2022 08:45 AM    CO2 20 11/01/2022 08:45 AM    BUN 11 11/01/2022 08:45 AM    LABALBU 3.6 10/28/2022 04:31 PM    LABALBU 4.7 04/19/2012 11:30 AM    CREATININE 0.75 11/01/2022 08:45 AM    CALCIUM 8.3 11/01/2022 08:45 AM    GFRAA >60 09/28/2022 01:33 PM    LABGLOM >60 11/01/2022 08:45 AM    GLUCOSE 151 11/01/2022 08:45 AM    GLUCOSE 65 04/19/2012 11:30 AM     PT/INR:    Lab Results   Component Value Date/Time    PROTIME 16.3 11/01/2022 12:36 PM    INR 1.3 11/01/2022 12:36 PM     PTT:    Lab Results   Component Value Date/Time    APTT 35.0 10/14/2022 01:00 PM   [APTT}    Physical Examination:        General appearance: alert, cooperative and no distress  Mental Status: oriented to person, place and time and normal affect  Lungs: clear to auscultation bilaterally, normal effort  Heart: regular rate and rhythm, no murmur,  Abdomen: soft, nontender, distended, bowel sounds present  Extremities: no edema, redness or tenderness in the calves  Skin: no gross lesions, rashes, or induration    Assessment:        Primary Problem  GI bleed     Active Hospital Problems    Diagnosis Date Noted    Goals of care, counseling/discussion [Z71.89] 10/31/2022     Priority: Medium    DNR (do not resuscitate) discussion [Z71.89] 10/31/2022     Priority: Medium    ACP (advance care planning) [Z71.89] 10/31/2022     Priority: Medium    Palliative care encounter [Z51.5] 10/31/2022     Priority: Medium    GI bleed [K92.2] 09/13/2022     Priority: Medium    Portal hypertension ([facility] [K76.6]      Priority: Medium    Ascites [R18.8] 04/26/2022     Priority: Medium    Hypotension [I95.9] 12/20/2021    Hepatic cirrhosis ([facility] [M04.61] 09/15/2016     Past Medical History:   Diagnosis Date    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 ([address]     Alcoholic cirrhosis of liver with ascites ([address]     Anemia 04/13/2022    Anxiety     Arthritis     Back pain, chronic     dr. Sam Carpenter, orthopedic, every 3-4 months, gets steroid injection    Lezama esophagus     BPH (benign prostatic hypertrophy)     Cholelithiasis     Cirrhosis ([address]     COVID-19 12/2020    pt reports he had a positive test while at [facility] in 2020, was asymptomatic    COVID-19 vaccine series completed 5/20/2021, 6/22/2021    Moderna 5/20/2021, 6/22/2021    DDD (degenerative disc disease), lumbar     Depression     Esophageal cancer ([address]     INVASIVE ADENOCARCINOMA ARISING IN TUBULAR ADENOMA WITH HIGH GRADE DYSPLASIA, ASSOCIATED WITH FOCAL INTESTINAL METAPLASIA     Esophageal varices ([address]     Fatty liver     GERD (gastroesophageal reflux disease)     Hep C w/o coma, chronic ([address]     History of alcohol abuse     6-12 beers a day; quit drinking 2019    History of blood transfusion     History of colon polyps 2016    History of tobacco abuse     Citizen [place] (hard of hearing)     Hyperlipidemia     Hypertension     Hyponatremia 07/20/2016    Port-A-Cath in place     right upper chest    Portal hypertension ([address]     Sciatica     Secondary esophageal varices ([address] 06/07/2022    Shortness of breath     Spinal stenosis     Stomach ulcer     hx of    Tubular adenoma of colon 2016, 2018    Vitamin D deficiency     Wears glasses         Plan:        Anemia, cirrhosis, hx EV, PHG s/p EGD 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   PPI BID  Nonselective beta blocker  Discussed with patient and radiologist  Given recurrent bleeding, recurrent ascites, may need TIPS and this will be arranged outpatient  Will need outpatient consult with IR  Continue diuretics as ordered  2 gm sodium diet  May d/c per GI  CBC, CMP next week    Time spent reviewing the chart, seeing the patient, and discussing with the attending MD around 30 minutes. Explained to the patient and d/W Nursing Staff  Will F/U with you  Please call or Page for any issues or change in status  Thanks    Electronically signed by MASSIMO Olivera NP on 11/1/2022 at 3:52 PM         GI attending physician note. Patient seen with MASSIMO at about 1:30 PM    I independently performed an evaluation on Maryuri Navastone. I have reviewed the above documentation completed by the Nurse Practitioner and agree with the history, examination, and management plan. Recommendations discussed. Patient appears stable. Hemoglobin has been stable. He is tolerating diet. Yesterday I did discuss with IR service regarding possible TIPS placement and they will see him in the clinic and this was explained to the patient. Advised to keep the appointment. Strongly encouraged to follow low-sodium diet. Not able to increase diuretics as he is getting significant hyponatremia. After discussion patient understood and agreed.   From GI point of view may be discharged    Advised labs and office visit in the next 2 to 3 weeks

## 2022-11-01 NOTE — PROGRESS NOTES
[facility]    PROGRESS NOTE             11/1/2022    2:02 PM    Name:   Talisha Rosa  MRN:     [id number]     Acct:      [de-identified]   Room:   2115/2115-01   Day:  3  Admit Date:  10/29/2022 10:53 AM    PCP:  VASU Wilde  Code Status:  Full Code    Subjective:     C/C:   Chief Complaint   Patient presents with    Abnormal Lab     HGB 5.1     Interval History Status: improved. Pt Seen and examined at bedside. No acute events overnight per patient. Hemoglobin stable today around 8.9. States that his abdomen is distended, will be getting paracentesis this Friday per patient. Patient tolerating full liquid diet. Denies nausea, vomiting, fever, abdominal pain. Brief History:        The patient is a 60 y.o.   male with hx of esophageal AC s/p chemoradiation, Hep C, alcoholic liver cirrhosis with portal hypertension and variceal bleeding, with multiple transfusions, who presents withAbnormal Lab (HGB 5.1) after he was told so by Dr. Prasanna Cooper his Gastroenterologist and is admitted for the management of acute blood loss anemia     Patient reports that he gets paracentesis for ascites once every week, and hemoglobin levels once a week as well. Per patient and notes, patient had a hemoglobin level check on Friday 10/21/2022 which was 6.4 and therefore he was transfused a unit of blood. He was told to check his CBC on Saturday, and was found today to have a hemoglobin of 5.1. Patient was advised to go to the ED immediately for admission and further management. Patient had his last paracentesis yesterday. Patient reports similar previous episodes, however, he said that this time he did not have any abdominal pain preceding the episode, however he reports dark tarry stools. Patient has a history of esophageal varices, that were previously banded.  He also reports that on 90/14 he had some areas cauterized Patient last endoscopy was 1 week ago which showed small esophageal varices, and an ulcer at the GE junction from the previous variceal banding. Patient also has sliding hiatal hernia, angiectasia and portal hypertensive gastropathy was also appreciated. On September, patient had an EGD, which showed, Multiple angiectasias in gastric fundus and esophagogastric junction presumably related to prior radiation. During admission, paracentesis hemoglobin level of and 5.6. Patient had a soft blood pressure 109/57 followed by 91/55, not tachycardic. Patient was immediately transfused 1 unit hemoglobin, and another unit was also pending. Patient started on octreotide infusion, pantoprazole infusion, as well as ceftriaxone IV as a prophylactic measure for underlying risk of SBP. CT chest abdomen pelvis with IV contrast is planned for 11/03/2022 for follow-up on esophageal adenocarcinoma. Review of Systems:     Review of Systems   Constitutional:  Negative for fatigue. HENT:  Negative for rhinorrhea and sore throat. Eyes:  Negative for visual disturbance. Respiratory:  Negative for shortness of breath. Cardiovascular:  Negative for chest pain and palpitations. Gastrointestinal:  Positive for abdominal distention. Negative for abdominal pain, constipation, diarrhea, nausea and vomiting. Endocrine: Negative. Genitourinary:  Negative for dysuria. Skin:  Negative for rash. Neurological:  Negative for headaches. Medications:      Allergies:  No Known Allergies    Current Meds:   Scheduled Meds:    spironolactone  25 mg Oral Daily    midodrine  10 mg Oral 4x daily    ferrous sulfate  325 mg Oral Every Other Day    pantoprazole (PROTONIX) 40 mg injection  40 mg IntraVENous Daily    sodium chloride flush  5-40 mL IntraVENous 2 times per day    cefTRIAXone (ROCEPHIN) IV  1,000 mg IntraVENous Q24H    atorvastatin  20 mg Oral Nightly    lactulose  30 g Oral TID    nadolol  20 mg Oral Daily furosemide  20 mg Oral BID     Continuous Infusions:    sodium chloride      sodium chloride 20 mL/hr at 10/29/22 2205    pantoprazole 8 mg/hr (11/01/22 1103)    octreotide (SandoSTATIN) infusion 50 mcg/hr (11/01/22 1103)     PRN Meds: sodium chloride, midodrine, oxyCODONE, sodium chloride flush, sodium chloride, ondansetron **OR** ondansetron, polyethylene glycol    Data:     Past Medical History:   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 ([facility] [address] Alcoholic cirrhosis of liver with ascites ([address] Anemia, Anxiety, Arthritis, Back pain, chronic, [place] esophagus, BPH (benign prostatic hypertrophy), Cholelithiasis, Cirrhosis ([facility] [address] COVID-19, COVID-19 vaccine series completed, DDD (degenerative disc disease), lumbar, Depression, Esophageal cancer ([facility] [address] Esophageal varices ([facility] [address] Fatty liver, GERD (gastroesophageal reflux disease), Hep C w/o coma, chronic ([facility] [address] History of alcohol abuse, History of blood transfusion, History of colon polyps, History of tobacco abuse, [place] (hard of hearing), Hyperlipidemia, Hypertension, Hyponatremia, Port-A-Cath in place, Portal hypertension ([facility] [address] Sciatica, Secondary esophageal varices ([address] Shortness of breath, Spinal stenosis, Stomach ulcer, Tubular adenoma of colon, Vitamin D deficiency, and Wears glasses. Social History:   reports that he quit smoking about 5 years ago. His smoking use included cigarettes. He has a 45.00 pack-year smoking history. He has never used smokeless tobacco. He reports that he does not currently use alcohol. He reports that he does not currently use drugs after having used the following drugs: Cocaine. Frequency: 1.00 time per week.      Family History:   Family History   Problem Relation Age of Onset    Cancer Mother         pancreatic    Cancer Father         bone    Diabetes Sister     Asthma Brother        Vitals:  /76   Pulse 58   Temp 98 °F (36.7 °C) (Oral)   Resp 18   Ht 5' 10\" (1.778 m)   Wt 222 lb 10.6 oz (101 kg) SpO2 100%   BMI 31.95 kg/m²   Temp (24hrs), Av.8 °F (36.6 °C), Min:97.6 °F (36.4 °C), Max:98 °F (36.7 °C)    No results for input(s): POCGLU in the last 72 hours. I/O(24Hr): Intake/Output Summary (Last 24 hours) at 2022 1402  Last data filed at 10/31/2022 1805  Gross per 24 hour   Intake 150 ml   Output 350 ml   Net -200 ml       Labs:        Lab Results   Component Value Date/Time    SPECIAL NOT REPORTED 2022 11:28 AM     Lab Results   Component Value Date/Time    CULTURE NO SIGNIFICANT GROWTH 2022 11:00 PM         Radiology:    IR US GUIDED PARACENTESIS    Result Date: 10/28/2022  PROCEDURE: PARACENTESIS WITH IMAGE GUIDANCE US ABDOMEN LIMITED 10/28/2022 HISTORY: ORDERING SYSTEM PROVIDED HISTORY: Alcoholic cirrhosis of liver with ascites ([address] TECHNOLOGIST PROVIDED HISTORY: Weekly due o amount drained every 2 weeks TECHNIQUE: Informed consent was obtained after a detailed explanation of the procedure including risks, benefits, and alternatives. Universal protocol was followed. A limited ultrasound of the abdomen was performed and demonstrates a large amount of ascites. The right abdomen was prepped and draped in sterile fashion and local anesthesia was achieved with lidocaine. A 5 Bangladeshi needle sheath was advanced into ascites using realtime ultrasound guidance and paracentesis was performed. The patient tolerated the procedure well. EBL: None FINDINGS: Limited ultrasound of the abdomen demonstrates ascites. A total of 8.1 L of slightly turbid yellow colored fluid was removed. Supplemental albumin was given intravenously     Successful ultrasound-guided paracentesis.      IR US GUIDED PARACENTESIS    Result Date: 10/21/2022  PROCEDURE: PARACENTESIS WITH IMAGE GUIDANCE US ABDOMEN LIMITED 10/21/2022 HISTORY: ORDERING SYSTEM PROVIDED HISTORY: Alcoholic cirrhosis of liver with ascites ([address] TECHNOLOGIST PROVIDED HISTORY: Weekly due o amount drained every 2 weeks TECHNIQUE: Informed consent was obtained after a detailed explanation of the procedure including risks, benefits, and alternatives. Universal protocol was followed. A limited ultrasound of the abdomen was performed and shows large amount of ascites. The left abdomen was prepped and draped in sterile fashion and local anesthesia was achieved with lidocaine. A 5 Ghanaian needle sheath was advanced into ascites using realtime ultrasound guidance and paracentesis was performed. The patient tolerated the procedure well. EBL: None FINDINGS: Limited ultrasound of the abdomen demonstrates ascites. A total of 7.9 L of clear yellow fluid was removed. Supplemental albumin was given intravenously. Successful ultrasound-guided paracentesis. IR US GUIDED PARACENTESIS    Result Date: 10/14/2022  PROCEDURE: PARACENTESIS WITH IMAGE GUIDANCE US ABDOMEN LIMITED 10/14/2022 HISTORY: ORDERING SYSTEM PROVIDED HISTORY: Alcoholic cirrhosis of liver with ascites ([address] TECHNOLOGIST PROVIDED HISTORY: Weekly due o amount drained every 2 weeks TECHNIQUE: Informed consent was obtained after a detailed explanation of the procedure including risks, benefits, and alternatives. Universal protocol was followed. A limited ultrasound of the abdomen was performed. The right abdomen was prepped and draped in sterile fashion and local anesthesia was achieved with lidocaine. An 5 Ghanaian needle sheath was advanced into ascites and paracentesis was performed. The patient tolerated the procedure well. FINDINGS: Limited ultrasound of the abdomen demonstrates ascites. A total of 9.7 L was removed. Successful ultrasound-guided therapeutic paracentesis.      IR US GUIDED PARACENTESIS    Result Date: 10/7/2022  PROCEDURE: PARACENTESIS WITHOUT IMAGE GUIDANCE US ABDOMEN LIMITED 10/7/2022 HISTORY: ORDERING SYSTEM PROVIDED HISTORY: Alcoholic cirrhosis of liver with ascites ([address] TECHNOLOGIST PROVIDED HISTORY: Weekly due o amount drained every 2 weeks TECHNIQUE: Informed consent was obtained after a explanation of the procedure including risks. Universal protocol was followed. A limited ultrasound of the abdomen was performed. The right abdomen was prepped and draped in sterile fashion, and local anesthesia was achieved with 1% lidocaine. A 5 Azerbaijani needle sheath was advanced into the ascites under direct ultrasound guidance (a sterile probe cover and gel were used), and paracentesis was performed. A total of 10.6 L of yellow fluid was aspirated. IV albumin therapy was initiated. The patient tolerated the procedure well without immediately apparent complication. FINDINGS: Initial limited abdominal ultrasound demonstrated a large amount of ascites. Successful paracentesis         Physical Examination:        Physical Exam  Constitutional:       General: He is not in acute distress. HENT:      Head: Normocephalic and atraumatic. Cardiovascular:      Rate and Rhythm: Normal rate and regular rhythm. Pulses: Normal pulses. Heart sounds: Normal heart sounds. Pulmonary:      Effort: Pulmonary effort is normal.      Breath sounds: Normal breath sounds. Abdominal:      General: There is distension. Tenderness: There is no abdominal tenderness. Musculoskeletal:      Right lower leg: No edema. Left lower leg: No edema. Skin:     General: Skin is warm and dry. Neurological:      Mental Status: He is alert.          Assessment:        Primary Problem  GI bleed    Active Hospital Problems    Diagnosis Date Noted    Goals of care, counseling/discussion [Z71.89] 10/31/2022     Priority: Medium    DNR (do not resuscitate) discussion [Z71.89] 10/31/2022     Priority: Medium    ACP (advance care planning) [Z71.89] 10/31/2022     Priority: Medium    Palliative care encounter [Z51.5] 10/31/2022     Priority: Medium    GI bleed [K92.2] 09/13/2022     Priority: Medium    Portal hypertension ([address] [K76.6]      Priority: Medium    Ascites [R18.8] 04/26/2022 Priority: Medium    Hypotension [I95.9] 12/20/2021    Hepatic cirrhosis ([facility] [address] [K74.60] 09/15/2016       Plan:        Acute anemia most likely secondary to upper GI blood loss in the setting of varices and telangiectasias  - Patient received 3 packed RBC, H&H every 6 hours  - HB 8.9 today, stable    - GI consulted, Dr. Zahra Rai  - EGD done yesterday- showed telangiectasia related to radiation for esophageal cancer  - Recommended continuing PPI therapy and discuss IR service for possible TIPS procedure  - Patient on octreotide infusion, PPI infusion.  - Continue nadolol, continue midodrine.  - Plan for discharge.  Will f/u with TIPS procedure and paracentesis outpatient      Liver cirrhosis secondary to chronic alcoholism  - patient on furosemide 20 mg twice daily  - continue nadolol, lactulose 30 mg tid, and midodrine     Hyperlipidemia   Continue atorvastatin       Sharita Jackson MD  11/1/2022  2:02 PM

## 2022-11-01 NOTE — PROGRESS NOTES
Attending Physician Statement  I have discussed the care of Kyra Marie with the resident team. I have examined the patient myself and taken ros and hpi , including pertinent history and exam findings,  with the resident. I have reviewed the key elements of all parts of the encounter with the resident. I agree with the assessment, plan and orders as documented by the resident. Principal Problem:    GI bleed  Active Problems:    Ascites    Portal hypertension (HCC)    Goals of care, counseling/discussion    DNR (do not resuscitate) discussion    ACP (advance care planning)    Palliative care encounter    Hepatic cirrhosis ([facility] [address]    Hypotension  Resolved Problems:    * No resolved hospital problems. *    Liver cirrhosis, ascites, portal hypertensive gastropathy, recurrent ascites, recurrent anemia on CBC weekly check outpatient, esophageal adenoca, that is post chemoradiation, admitted for low hemoglobin of 5.1, tarry stools  S/p 3 units PRBC   Placed on PPI and octreotide infusion, on IV Rocephin  EGD today -telangiectasia related to radiation for esophageal cancer,   Will need evaluation for TIPS  Resumed Lasix, Aldactone, nadolol    11/1  HB stable  Discharge planning  Paracentesis due OP on Fri per his Op schedule      Full note to follow.       Electronically signed by Jaren Shah MD

## 2022-11-01 NOTE — PROGRESS NOTES
[address]   OCCUPATIONAL THERAPY MISSED TREATMENT NOTE   INPATIENT   Date: 22  Patient Name: Trupti Portillo       Room:   MRN: [id number]   Account #: [de-identified]    : 1960  (60 y.o.)  Gender: male        REASON FOR MISSED TREATMENT:  other    -  pt reports being at baseline function, independent with ADLs/IADLs and transfers/mobility with no acute OT needs at this time. JERI Lind in agreement. Will defer OT eval at this time. Please re-order if needs arise.     22 Laura Radford, OT

## 2022-11-01 NOTE — FLOWSHEET NOTE
11/01/22 1351   Treatment Team Notification   Reason for Communication Review case   Team Member Name Korina Porter CARMEL   Treatment Team Role Advanced Practice Nurse   Method of Communication Secure Message   Notification Time [id number]     GI saw patient at the bedside to confirm outpatient f/u and TIPS procedure. Follow-up instructions to be included in discharge paperwork. Electronically signed by Tang Vasquez RN.

## 2022-11-01 NOTE — PROGRESS NOTES
Physical Therapy Cancel Note      DATE: 2022    NAME: Edyta Fraser  MRN: [id number]   : 1960      Patient not seen this date for Physical Therapy due to:    Patient independent with functional mobility. Will defer PT evaluation at this time. Pt denies difficulty with ambulation or adverse symptoms during mobility. Servando RN confirms pt is ambulatory in room. Please reorder PT if future needs arise.      Time attempted: [phone]    Electronically signed by Partha Brenner PT on 2022 at 11:36 AM

## 2022-11-01 NOTE — PROGRESS NOTES
Discharge paperwork gone over with patient at the bedside. Current plan of care, medication updates and future follow-up appointments discussed with patient. All questions/concerns addressed at this time. Electronically signed by Esdras Olivares RN.

## 2022-11-02 NOTE — DISCHARGE SUMMARY
[address]    Discharge Summary     Patient ID: Heavenly Lemus  :  [id number]   MRN: [id number]     ACCOUNT:  [de-identified]   Patient's PCP: VASU Conner  Admit Date: 10/29/2022   Discharge Date: 2022     Length of Stay: 3  Code Status:  Prior  Admitting Physician: Felix Giron MD  Discharge Physician: Jeannene Alpers, MD     Active Discharge Diagnoses:       Primary Problem  GI bleed      [place] Problems    Diagnosis Date Noted    Goals of care, counseling/discussion [Z71.89] 10/31/2022     Priority: Medium    DNR (do not resuscitate) discussion [Z71.89] 10/31/2022     Priority: Medium    ACP (advance care planning) [Z71.89] 10/31/2022     Priority: Medium    Palliative care encounter [Z51.5] 10/31/2022     Priority: Medium    GI bleed [K92.2] 2022     Priority: Medium    Portal hypertension ([address] [K76.6]      Priority: Medium    Ascites [R18.8] 2022     Priority: Medium    Hypotension [I95.9] 2021    Hepatic cirrhosis ([facility] [address] [K74.60] 09/15/2016       Admission Condition:  poor     Discharged Condition: fair    Hospital Stay:       Hospital Course:  Heavenly Lemus is a 60 y.o. male who was admitted for the management of  GI bleed , presented to ER with Abnormal Lab (HGB 5.1)    Pt is a 59 yo M who presents with PMHx of esophageal AC s/p chemoradiation, Hep C, alcoholic liver cirrhosis with portal hypertension and variceal bleeding s/p banding, with multiple transfusions. Patient reported that he gets paracentesis for ascites once every week, and hemoglobin levels once a week as well due to recurrent anemia. Pt presented with abnormal lab (HGB 5.1) and admitted for acute blood loss anemia 2/2 upper GI bleed. Transfused with 3 unit pRBC. EGD was done and showed telangiectasia related to radiation for esophageal cancer.  Pt was placed on octreotide and PPI infusion, and IV rocephin as a prophylactic measure for underlying risk of SBP. Started on nadolol, lactulose, and lasix for liver cirrhosis. Once Hb was stable, pt was discharged and informed to f/u on TIPS procedure and paracentesis outpatient.      Significant therapeutic interventions: Lasix, aldactone, nadalol, rocephin, pRBC transfusions    Significant Diagnostic Studies: EGD     Labs / Micro:  CBC:   Lab Results   Component Value Date/Time    WBC 7.4 11/01/2022 08:45 AM    RBC 3.28 11/01/2022 08:45 AM    RBC 4.75 04/19/2012 11:30 AM    HGB 8.9 11/01/2022 08:45 AM    HCT 29.3 11/01/2022 08:45 AM    MCV 89.3 11/01/2022 08:45 AM    MCH 27.2 11/01/2022 08:45 AM    MCHC 30.4 11/01/2022 08:45 AM    RDW 16.9 11/01/2022 08:45 AM     11/01/2022 08:45 AM     04/19/2012 11:30 AM     CMP:    Lab Results   Component Value Date/Time    GLUCOSE 151 11/01/2022 08:45 AM    GLUCOSE 65 04/19/2012 11:30 AM     11/01/2022 08:45 AM    K 4.2 11/01/2022 08:45 AM     11/01/2022 08:45 AM    CO2 20 11/01/2022 08:45 AM    BUN 11 11/01/2022 08:45 AM    CREATININE 0.75 11/01/2022 08:45 AM    ANIONGAP 10 11/01/2022 08:45 AM    ALKPHOS 87 10/28/2022 04:31 PM    ALT 10 10/28/2022 04:31 PM    AST 23 10/28/2022 04:31 PM    BILITOT 1.0 10/28/2022 04:31 PM    LABALBU 3.6 10/28/2022 04:31 PM    LABALBU 4.7 04/19/2012 11:30 AM    ALBUMIN 1.3 08/29/2022 02:48 PM    LABGLOM >60 11/01/2022 08:45 AM    GFRAA >60 09/28/2022 01:33 PM    GFR      09/28/2022 01:33 PM    PROT 5.3 10/28/2022 04:31 PM    CALCIUM 8.3 11/01/2022 08:45 AM       Radiology:    IR US GUIDED PARACENTESIS    Result Date: 10/28/2022  PROCEDURE: PARACENTESIS WITH IMAGE GUIDANCE US ABDOMEN LIMITED 10/28/2022 HISTORY: ORDERING SYSTEM PROVIDED HISTORY: Alcoholic cirrhosis of liver with ascites ([facility] [address] TECHNOLOGIST PROVIDED HISTORY: Weekly due o amount drained every 2 weeks TECHNIQUE: Informed consent was obtained after a detailed explanation of the procedure including risks, benefits, and HISTORY: Weekly due o amount drained every 2 weeks TECHNIQUE: Informed consent was obtained after a detailed explanation of the procedure including risks, benefits, and alternatives. Universal protocol was followed. A limited ultrasound of the abdomen was performed. The right abdomen was prepped and draped in sterile fashion and local anesthesia was achieved with lidocaine. An 5 Georgian needle sheath was advanced into ascites and paracentesis was performed. The patient tolerated the procedure well. FINDINGS: Limited ultrasound of the abdomen demonstrates ascites. A total of 9.7 L was removed. Successful ultrasound-guided therapeutic paracentesis. IR US GUIDED PARACENTESIS    Result Date: 10/7/2022  PROCEDURE: PARACENTESIS WITHOUT IMAGE GUIDANCE US ABDOMEN LIMITED 10/7/2022 HISTORY: ORDERING SYSTEM PROVIDED HISTORY: Alcoholic cirrhosis of liver with ascites ([facility] [address] TECHNOLOGIST PROVIDED HISTORY: Weekly due o amount drained every 2 weeks TECHNIQUE: Informed consent was obtained after a explanation of the procedure including risks. Universal protocol was followed. A limited ultrasound of the abdomen was performed. The right abdomen was prepped and draped in sterile fashion, and local anesthesia was achieved with 1% lidocaine. A 5 Georgian needle sheath was advanced into the ascites under direct ultrasound guidance (a sterile probe cover and gel were used), and paracentesis was performed. A total of 10.6 L of yellow fluid was aspirated. IV albumin therapy was initiated. The patient tolerated the procedure well without immediately apparent complication. FINDINGS: Initial limited abdominal ultrasound demonstrated a large amount of ascites.      Successful paracentesis         Consultations:    Consults:     Final Specialist Recommendations/Findings:   IP CONSULT TO PRIMARY CARE PROVIDER  IP CONSULT TO GI  IP CONSULT TO SOCIAL WORK  IP CONSULT TO PALLIATIVE CARE  IP CONSULT TO INTERVENTIONAL RADIOLOGY      The patient was seen and examined on day of discharge and this discharge summary is in conjunction with any daily progress note from day of discharge. Discharge plan:       Disposition: Home    Physician Follow Up:     Stacey Tejada [address] [facility] [address] [phone]    Schedule an appointment as soon as possible for a visit in 1 week(s)      [address] [facility] [address]  [phone]    Schedule an appointment as soon as possible for a visit in 1 week(s)         Requiring Further Evaluation/Follow Up POST HOSPITALIZATION/Incidental Findings:     Diet: regular diet    Activity: As tolerated    Instructions to Patient:   -Follow-up with scheduled outpatient appointments, including paracentesis on Friday 11/04/22   -Continue to take all ordered medications    Discharge Medications:      Medication List        START taking these medications      spironolactone 100 MG tablet  Commonly known as: ALDACTONE  Take 1 tablet by mouth every evening            CONTINUE taking these medications      atorvastatin 20 MG tablet  Commonly known as: LIPITOR  Take 1 tablet by mouth nightly     FeroSul 325 (65 Fe) MG tablet  Generic drug: ferrous sulfate  take 1 tablet by mouth twice a day     FLUoxetine 20 MG capsule  Commonly known as: PROZAC  Take 1 capsule by mouth daily     furosemide 20 MG tablet  Commonly known as: Lasix  Take 1 tablet by mouth 2 times daily     lactulose 10 GM/15ML solution  Commonly known as: CHRONULAC  take 30 milliliters by mouth three times a day     midodrine 5 MG tablet  Commonly known as: PROAMATINE  Take 2 tablets by mouth in the morning and 2 tablets at noon and 2 tablets before bedtime.      nadolol 20 MG tablet  Commonly known as: CORGARD  take 1 tablet by mouth once daily     ondansetron 4 MG disintegrating tablet  Commonly known as: ZOFRAN-ODT  dissolve 1 tablet by mouth every 8 hours if needed for nausea and vomiting Electronically signed by   Anand Espino MD  11/2/2022  3:36 PM      Thank you VASU Floyd for the opportunity to be involved in this patient's care. Attending Physician Statement  I have discussed the care of Mary Colon and I have examined the patient myselft and taken ros and hpi , including pertinent history and exam findings,  with the resident. I have reviewed the key elements of all parts of the encounter with the resident. I agree with the assessment, plan and orders as documented by the resident. I spent approx 35 mins in direct patient care as above and discussing discharge with patient, reviewing medications and counseling for discharge .     Electronically signed by Lina Sun MD

## 2022-11-03 ENCOUNTER — HOSPITAL ENCOUNTER (OUTPATIENT)
Dept: CT IMAGING | Age: 62
Discharge: HOME | End: 2022-11-05
Payer: MEDICARE

## 2022-11-03 ENCOUNTER — TELEPHONE (OUTPATIENT)
Dept: INTERVENTIONAL RADIOLOGY/VASCULAR | Age: 62
End: 2022-11-03

## 2022-11-03 ENCOUNTER — HOSPITAL ENCOUNTER (OUTPATIENT)
Dept: INTERVENTIONAL RADIOLOGY/VASCULAR | Age: 62
Discharge: HOME | End: 2022-11-05
Payer: MEDICARE

## 2022-11-03 VITALS — DIASTOLIC BLOOD PRESSURE: 59 MMHG | OXYGEN SATURATION: 100 % | HEART RATE: 97 BPM | SYSTOLIC BLOOD PRESSURE: 102 MMHG

## 2022-11-03 DIAGNOSIS — C15.5 MALIGNANT NEOPLASM OF LOWER THIRD OF ESOPHAGUS (HCC): ICD-10-CM

## 2022-11-03 DIAGNOSIS — K70.31 ALCOHOLIC CIRRHOSIS OF LIVER WITH ASCITES (HCC): ICD-10-CM

## 2022-11-03 DIAGNOSIS — K74.60 HEPATIC CIRRHOSIS, UNSPECIFIED HEPATIC CIRRHOSIS TYPE, UNSPECIFIED WHETHER ASCITES PRESENT (HCC): ICD-10-CM

## 2022-11-03 DIAGNOSIS — K76.6 PORTAL HYPERTENSION (HCC): Primary | ICD-10-CM

## 2022-11-03 DIAGNOSIS — R18.8 OTHER ASCITES: ICD-10-CM

## 2022-11-03 DIAGNOSIS — K92.2 GASTROINTESTINAL HEMORRHAGE, UNSPECIFIED GASTROINTESTINAL HEMORRHAGE TYPE: ICD-10-CM

## 2022-11-03 PROCEDURE — 6360000004 HC RX CONTRAST MEDICATION: Performed by: INTERNAL MEDICINE

## 2022-11-03 PROCEDURE — 6360000002 HC RX W HCPCS: Performed by: RADIOLOGY

## 2022-11-03 PROCEDURE — 6360000002 HC RX W HCPCS: Performed by: INTERNAL MEDICINE

## 2022-11-03 PROCEDURE — A4641 RADIOPHARM DX AGENT NOC: HCPCS | Performed by: INTERNAL MEDICINE

## 2022-11-03 PROCEDURE — 2709999900 IR US GUIDED PARACENTESIS

## 2022-11-03 PROCEDURE — 49083 ABD PARACENTESIS W/IMAGING: CPT

## 2022-11-03 PROCEDURE — 74177 CT ABD & PELVIS W/CONTRAST: CPT

## 2022-11-03 PROCEDURE — P9047 ALBUMIN (HUMAN), 25%, 50ML: HCPCS | Performed by: RADIOLOGY

## 2022-11-03 PROCEDURE — 2580000003 HC RX 258: Performed by: INTERNAL MEDICINE

## 2022-11-03 RX ORDER — ALBUMIN (HUMAN) 12.5 G/50ML
25 SOLUTION INTRAVENOUS ONCE
Status: COMPLETED | OUTPATIENT
Start: 2022-11-03 | End: 2022-11-03

## 2022-11-03 RX ORDER — HEPARIN SODIUM (PORCINE) LOCK FLUSH IV SOLN 100 UNIT/ML 100 UNIT/ML
100 SOLUTION INTRAVENOUS PRN
Status: DISCONTINUED | OUTPATIENT
Start: 2022-11-03 | End: 2022-11-06 | Stop reason: HOSPADM

## 2022-11-03 RX ORDER — SODIUM CHLORIDE 0.9 % (FLUSH) 0.9 %
10 SYRINGE (ML) INJECTION PRN
Status: DISCONTINUED | OUTPATIENT
Start: 2022-11-03 | End: 2022-11-06 | Stop reason: HOSPADM

## 2022-11-03 RX ORDER — 0.9 % SODIUM CHLORIDE 0.9 %
80 INTRAVENOUS SOLUTION INTRAVENOUS ONCE
Status: COMPLETED | OUTPATIENT
Start: 2022-11-03 | End: 2022-11-03

## 2022-11-03 RX ADMIN — SODIUM CHLORIDE, PRESERVATIVE FREE 10 ML: 5 INJECTION INTRAVENOUS at 16:37

## 2022-11-03 RX ADMIN — BARIUM SULFATE 450 ML: 20 SUSPENSION ORAL at 16:37

## 2022-11-03 RX ADMIN — ALBUMIN (HUMAN) 25 G: 0.25 INJECTION, SOLUTION INTRAVENOUS at 15:15

## 2022-11-03 RX ADMIN — IOPAMIDOL 100 ML: 755 INJECTION, SOLUTION INTRAVENOUS at 16:37

## 2022-11-03 RX ADMIN — SODIUM CHLORIDE, PRESERVATIVE FREE 100 UNITS: 5 INJECTION INTRAVENOUS at 16:45

## 2022-11-03 RX ADMIN — ALBUMIN (HUMAN) 25 G: 0.25 INJECTION, SOLUTION INTRAVENOUS at 16:00

## 2022-11-03 RX ADMIN — SODIUM CHLORIDE 80 ML: 9 INJECTION, SOLUTION INTRAVENOUS at 16:37

## 2022-11-03 RX ADMIN — ALBUMIN (HUMAN) 25 G: 0.25 INJECTION, SOLUTION INTRAVENOUS at 15:39

## 2022-11-03 NOTE — BRIEF OP NOTE
Brief Postoperative Note    Talisha Rosa  YOB: 1960  [id number]    Pre-operative Diagnosis: Recurrent ascites; abdominal discomfort    Post-operative Diagnosis: Same    Procedure: US guided paracentesis     Anesthesia: Local    Surgeons/Assistants: Gregg    Estimated Blood Loss: less than 50     Complications: None    Specimens: Was Not Obtained     Electronically signed by Carline Williamson MD on 11/3/2022 at 3:31 PM

## 2022-11-03 NOTE — PROGRESS NOTES
Patient tolerated procedure well without distress. 10.5 L of cloudy, yellow fluid removed. Area cleansed & dry dressing applied. Transport to CT via cart.

## 2022-11-03 NOTE — TELEPHONE ENCOUNTER
Attempted to contact the patient to schedule a consult with dr Lr Given for a TIPS.   LM on [place] [employer]

## 2022-11-04 ENCOUNTER — TELEPHONE (OUTPATIENT)
Dept: PRIMARY CARE CLINIC | Age: 62
End: 2022-11-04

## 2022-11-04 NOTE — TELEPHONE ENCOUNTER
Patient called asking to make appointment for a hospital follow up and started yelling at me for asking question to make sure what was in the chart was correct , Patient then said \" You know what henry is not a real doctor , he can not help me so forget the appointment \" and hangs up on me .

## 2022-11-07 ENCOUNTER — OFFICE VISIT (OUTPATIENT)
Dept: GASTROENTEROLOGY | Age: 62
End: 2022-11-07
Payer: MEDICARE

## 2022-11-07 VITALS
BODY MASS INDEX: 28.63 KG/M2 | HEART RATE: 80 BPM | HEIGHT: 70 IN | DIASTOLIC BLOOD PRESSURE: 73 MMHG | SYSTOLIC BLOOD PRESSURE: 118 MMHG | WEIGHT: 200 LBS

## 2022-11-07 DIAGNOSIS — K70.30 ALCOHOLIC CIRRHOSIS, UNSPECIFIED WHETHER ASCITES PRESENT (HCC): ICD-10-CM

## 2022-11-07 DIAGNOSIS — C80.1 ADENOCARCINOMA IN A POLYP (HCC): ICD-10-CM

## 2022-11-07 DIAGNOSIS — D64.9 ANEMIA, UNSPECIFIED TYPE: ICD-10-CM

## 2022-11-07 DIAGNOSIS — K76.6 PORTAL HYPERTENSIVE GASTROPATHY (HCC): ICD-10-CM

## 2022-11-07 DIAGNOSIS — K70.31 ALCOHOLIC CIRRHOSIS OF LIVER WITH ASCITES (HCC): Primary | ICD-10-CM

## 2022-11-07 DIAGNOSIS — K31.89 PORTAL HYPERTENSIVE GASTROPATHY (HCC): ICD-10-CM

## 2022-11-07 PROCEDURE — 3074F SYST BP LT 130 MM HG: CPT | Performed by: INTERNAL MEDICINE

## 2022-11-07 PROCEDURE — 99213 OFFICE O/P EST LOW 20 MIN: CPT | Performed by: INTERNAL MEDICINE

## 2022-11-07 PROCEDURE — 3078F DIAST BP <80 MM HG: CPT | Performed by: INTERNAL MEDICINE

## 2022-11-07 ASSESSMENT — ENCOUNTER SYMPTOMS
VOMITING: 0
COLOR CHANGE: 1
CONSTIPATION: 0
TROUBLE SWALLOWING: 0
BACK PAIN: 1
RESPIRATORY NEGATIVE: 1
ALLERGIC/IMMUNOLOGIC NEGATIVE: 1
COUGH: 0
SHORTNESS OF BREATH: 0
CHOKING: 0
ANAL BLEEDING: 0
ABDOMINAL DISTENTION: 1
DIARRHEA: 0
RECTAL PAIN: 0
WHEEZING: 0

## 2022-11-07 NOTE — PROGRESS NOTES
GI OFFICE FOLLOW UP    INTERVAL HISTORY:   No referring provider defined for this encounter. Chief Complaint   Patient presents with    Cirrhosis     Pt here for f/u, had egd and labs done        1. Alcoholic cirrhosis of liver with ascites ([address]    2. Anemia, unspecified type    3. Alcoholic cirrhosis, unspecified whether ascites present ([address]    4. Portal hypertensive gastropathy ([address]    5. 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 Recently patient was at [facility] with a drop in hemoglobin. Patient received transfusion and at the time of discharge hemoglobin was stable. Bowel movements brown-colored. Patient is getting abdominal paracentesis on daily basis. He could not tolerate higher doses of diuretics. He was getting severe hyponatremia. I had long discussion with the patient when he was in the hospital and also today Regarding TIPS procedure. Explained regarding side effects including encephalopathy. At present he has a normal colored bowel movements. Energy levels good. He is slowly accumulating ascites. No shortness of breath. No swelling of the lower extremities. Because of esophageal cancer which was treated with chemoradiation therapy, he was denied transplant evaluation. Past Medical,Family, and Social History reviewed and does contribute to the patient presenting condition. Patient's PMH/PSH,SH,PSYCH Hx, MEDs, ALLERGIES, and ROS were all reviewed and updated in the appropriate sections.  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 Anum Mcneil, orthopedic, every 3-4 months, gets steroid injection [place]ama esophagus     BPH (benign prostatic hypertrophy)     Cholelithiasis     Cirrhosis ([address]     COVID-19 12/2020    pt reports he had a positive test while at [facility] in 2020, was asymptomatic    COVID-19 vaccine series completed 5/20/2021, 6/22/2021    Moderna 5/20/2021, 6/22/2021    DDD (degenerative disc disease), lumbar     Depression     Esophageal cancer ([facility]     INVASIVE ADENOCARCINOMA ARISING IN TUBULAR ADENOMA WITH HIGH GRADE DYSPLASIA, ASSOCIATED WITH FOCAL INTESTINAL METAPLASIA     Esophageal varices (HCC)     Fatty liver     GERD (gastroesophageal reflux disease)     Hep C w/o coma, chronic ([address]     History of alcohol abuse     6-12 beers a day; quit drinking 2019    History of blood transfusion     History of colon polyps 2016    History of tobacco abuse     [place]     Hyperlipidemia     Hypertension     Hyponatremia 07/20/2016    Port-A-Cath in place     right upper chest    Portal hypertension ([address]     Sciatica     Secondary esophageal varices ([facility] 06/07/2022    Shortness of breath     Spinal stenosis     Stomach ulcer     hx of    Tubular adenoma of colon 2016, 2018    Vitamin D deficiency     Wears glasses        Past Surgical History:   Procedure Laterality Date    BUNIONECTOMY      twice on right side    BUNIONECTOMY Left     CARPAL TUNNEL RELEASE Right     COLONOSCOPY      at age 35    COLONOSCOPY  10/05/2016    polyps-pathology tubular adenoma, and abnormal looking mucosa right colon-pathology-tubular adenoma    COLONOSCOPY N/A 03/30/2018    COLONOSCOPY POLYPECTOMY COLD BIOPSY performed by Evert Avendano MD at [id number] [facility]  03/30/2018    Small polyp in the sigmoid colon and excised with biopsy forceps--tubular adenoma    COLONOSCOPY N/A 04/16/2022    COLONOSCOPY POLYPECTOMY performed by Evert Avendano MD at [place] [address], COLON, DIAGNOSTIC      EGD    IR PORT PLACEMENT EQUAL OR GREATER THAN 5 YEARS  04/19/2021    IR PORT PLACEMENT EQUAL 02/12/2021    ENDOSCOPIC ULTRASOUND, EGD performed by Aure Lemon MD at [place] [address]  02/12/2021    EGD DIAGNOSTIC ONLY performed by Aure Lemon MD at [place] [address] 08/31/2021    EGD BIOPSY performed by Ezra Rajan MD at [address] [place] [address] 01/21/2022    EGD BIOPSY performed by Ezra Rajan MD at [address] [place] [address] N/A 04/15/2022    EGD ESOPHAGOGASTRODUODENOSCOPY performed by Maria Victoria Ortiz MD at [address] [place] [address] 06/06/2022    EGD BAND LIGATION performed by Pau Tovar MD at [address] [place] [address] N/A 06/09/2022    EGD ESOPHAGOGASTRODUODENOSCOPY performed by Pau Tovar MD at [address] [place] [address] 9/14/2022    EGD ESOPHAGOGASTRODUODENOSCOPY ENDOSCOPIC APC AT [facility] performed by Ever Urbina MD at [address] [place] [address] 10/19/2022    EGD BIOPSY performed by Ezra Rajan MD at [address] [place] [address] 10/31/2022    EGD with APC performed by Ezra Rajan MD at [address] [facility]:    Current Outpatient Medications:     FEROSUL 325 (65 Fe) MG tablet, take 1 tablet by mouth twice a day, Disp: 60 tablet, Rfl: 5    nadolol (CORGARD) 20 MG tablet, take 1 tablet by mouth once daily, Disp: 30 tablet, Rfl: 2    midodrine (PROAMATINE) 5 MG tablet, Take 2 tablets by mouth in the morning and 2 tablets at noon and 2 tablets before bedtime. , Disp: 90 tablet, Rfl: 3    furosemide (LASIX) 20 MG tablet, Take 1 tablet by mouth 2 times daily, Disp: 60 tablet, Rfl: 3    spironolactone (ALDACTONE) 100 MG tablet, Take 1 tablet by mouth every evening, Disp: 30 tablet, Rfl: 1    ondansetron (ZOFRAN-ODT) 4 MG disintegrating tablet, dissolve 1 tablet by mouth every 8 hours if needed for nausea and vomiting, Disp: 10 tablet, Rfl: 0    lactulose (CHRONULAC) 10 GM/15ML solution, take 30 milliliters by mouth three times a day, Disp: 473 mL, Rfl: 1    FLUoxetine (PROZAC) 20 MG capsule, Take 1 capsule by mouth daily, Disp: 30 capsule, Rfl: 3    atorvastatin (LIPITOR) 20 MG tablet, Take 1 tablet by mouth nightly, Disp: 30 tablet, Rfl: 3    ALLERGIES:   No Known Allergies    FAMILY HISTORY:       Problem Relation Age of Onset    Cancer Mother         pancreatic    Cancer Father         bone    Diabetes Sister     Asthma Brother          SOCIAL HISTORY:   Social History     Socioeconomic History    Marital status: Single     Spouse name: Not on file    Number of children: Not on file    Years of education: Not on file    Highest education level: Not on file   Occupational History    Not on file   Tobacco Use    Smoking status: Former     Packs/day: 1.00     Years: 45.00     Pack years: 45.00     Types: Cigarettes     Quit date: 2017     Years since quittin.8    Smokeless tobacco: Never   Vaping Use    Vaping Use: Never used   Substance and Sexual Activity    Alcohol use: Not Currently     Comment: Quit alcohol in 2019- heavier drinking prior to quitting    Drug use: Not Currently     Frequency: 1.0 times per week     Types: Cocaine     Comment: Cocaine- stopped spring 2016    Sexual activity: Yes     Partners: Female   Other Topics Concern    Not on file   Social History Narrative     in the past, retired     Social Determinants of Health     Financial Resource Strain: Low Risk     Difficulty of Paying Living Expenses: Not hard at all   Food Insecurity: No Food Insecurity    Worried About Running Out of Food in the Last Year: Never true    [address] in the Last Year: Never true   Transportation Needs: Not on file   Physical Activity: Inactive    Days of Exercise per Week: 0 days    Minutes of Exercise per Session: 0 min   Stress: Not on file   Social Connections: Not on file   Intimate Partner Violence: Not on file   Housing Stability: Not on file         REVIEW OF SYSTEMS:         Review of Systems   Constitutional: Negative. Negative for appetite change, fatigue and unexpected weight change. HENT: Negative. Negative for trouble swallowing. Eyes:  Positive for visual disturbance (glasses ). Respiratory: Negative. Negative for cough, choking, shortness of breath and wheezing. Cardiovascular: Negative. Negative for chest pain, palpitations and leg swelling. Gastrointestinal:  Positive for abdominal distention. Negative for anal bleeding, constipation, diarrhea, rectal pain and vomiting. Endocrine: Negative. Genitourinary: Negative. Negative for difficulty urinating. Musculoskeletal:  Positive for back pain. Skin:  Positive for color change. Allergic/Immunologic: Negative. Negative for environmental allergies and food allergies. Neurological: Negative. Negative for dizziness, weakness, light-headedness, numbness and headaches. Hematological:  Bruises/bleeds easily. Psychiatric/Behavioral: Negative. Negative for sleep disturbance. The patient is not nervous/anxious. PHYSICAL EXAMINATION:     Vital signs reviewed per the nursing documentation. /73   Pulse 80   Ht 5' 10\" (1.778 m)   Wt 200 lb (90.7 kg)   BMI 28.70 kg/m²   Body mass index is 28.7 kg/m². Physical Exam  Vitals reviewed. Constitutional:       Appearance: Normal appearance. HENT:      Head: Normocephalic and atraumatic. Eyes:      Extraocular Movements: Extraocular movements intact. Conjunctiva/sclera: Conjunctivae normal.   Cardiovascular:      Rate and Rhythm: Normal rate and regular rhythm. Heart sounds: Normal heart sounds. Pulmonary:      Effort: Pulmonary effort is normal.      Breath sounds: Normal breath sounds. Abdominal:      General: Bowel sounds are normal. There is no distension. Palpations: Abdomen is soft. There is no mass. Tenderness: There is no abdominal tenderness. There is no guarding. Hernia: No hernia is present. Skin:     General: Skin is warm and dry. Neurological:      General: No focal deficit present. Mental Status: He is alert and oriented to person, place, and time. Psychiatric:         Mood and Affect: Mood normal.         Behavior: Behavior normal.         LABORATORY DATA: Reviewed  Lab Results   Component Value Date    WBC 7.4 11/01/2022    HGB 8.9 (L) 11/01/2022    HCT 29.3 (L) 11/01/2022    MCV 89.3 11/01/2022     (L) 11/01/2022     (L) 11/01/2022    K 4.2 11/01/2022     11/01/2022    CO2 20 11/01/2022    BUN 11 11/01/2022    CREATININE 0.75 11/01/2022    LABPROT 7.7 04/19/2012    LABALBU 3.6 10/28/2022    BILITOT 1.0 10/28/2022    ALKPHOS 87 10/28/2022    AST 23 10/28/2022    ALT 10 10/28/2022    INR 1.3 11/01/2022         Lab Results   Component Value Date    RBC 3.28 (L) 11/01/2022    HGB 8.9 (L) 11/01/2022    MCV 89.3 11/01/2022    MCH 27.2 11/01/2022    MCHC 30.4 (L) 11/01/2022    RDW 16.9 (H) 11/01/2022    MPV 7.2 11/01/2022    BASOPCT 0 11/01/2022    LYMPHSABS 0.44 (L) 11/01/2022    MONOSABS 1.04 11/01/2022    NEUTROABS 5.85 11/01/2022    EOSABS 0.07 11/01/2022    BASOSABS 0.00 11/01/2022         DIAGNOSTIC TESTING:     IR US GUIDED PARACENTESIS    Result Date: 11/4/2022  PROCEDURE: PARACENTESIS WITH IMAGE GUIDANCE US ABDOMEN LIMITED 11/3/2022 HISTORY: ORDERING SYSTEM PROVIDED HISTORY: Alcoholic cirrhosis of liver with ascites ([facility] TECHNOLOGIST PROVIDED HISTORY: Weekly due o amount drained every 2 weeks TECHNIQUE: Informed consent was obtained after a detailed explanation of the procedure including risks, benefits, and alternatives. Universal protocol was followed. A limited ultrasound of the abdomen was performed. The right abdomen was prepped and draped in sterile fashion and local anesthesia was achieved with lidocaine.   An 5 Swazi needle sheath was advanced into ascites and paracentesis was performed. The patient tolerated the procedure well. FINDINGS: Limited ultrasound of the abdomen demonstrates ascites. A total of 10.5 L was removed. Successful ultrasound-guided therapeutic paracentesis. IR US GUIDED PARACENTESIS    Result Date: 10/28/2022  PROCEDURE: PARACENTESIS WITH IMAGE GUIDANCE US ABDOMEN LIMITED 10/28/2022 HISTORY: ORDERING SYSTEM PROVIDED HISTORY: Alcoholic cirrhosis of liver with ascites ([facility] TECHNOLOGIST PROVIDED HISTORY: Weekly due o amount drained every 2 weeks TECHNIQUE: Informed consent was obtained after a detailed explanation of the procedure including risks, benefits, and alternatives. Universal protocol was followed. A limited ultrasound of the abdomen was performed and demonstrates a large amount of ascites. The right abdomen was prepped and draped in sterile fashion and local anesthesia was achieved with lidocaine. A 5 Panamanian needle sheath was advanced into ascites using realtime ultrasound guidance and paracentesis was performed. The patient tolerated the procedure well. EBL: None FINDINGS: Limited ultrasound of the abdomen demonstrates ascites. A total of 8.1 L of slightly turbid yellow colored fluid was removed. Supplemental albumin was given intravenously     Successful ultrasound-guided paracentesis. IR US GUIDED PARACENTESIS    Result Date: 10/21/2022  PROCEDURE: PARACENTESIS WITH IMAGE GUIDANCE US ABDOMEN LIMITED 10/21/2022 HISTORY: ORDERING SYSTEM PROVIDED HISTORY: Alcoholic cirrhosis of liver with ascites ([facility] [address] TECHNOLOGIST PROVIDED HISTORY: Weekly due o amount drained every 2 weeks TECHNIQUE: Informed consent was obtained after a detailed explanation of the procedure including risks, benefits, and alternatives. Universal protocol was followed. A limited ultrasound of the abdomen was performed and shows large amount of ascites.   The left abdomen was prepped and draped in sterile fashion and local anesthesia was achieved with lidocaine. A 5 Ukrainian needle sheath was advanced into ascites using realtime ultrasound guidance and paracentesis was performed. The patient tolerated the procedure well. EBL: None FINDINGS: Limited ultrasound of the abdomen demonstrates ascites. A total of 7.9 L of clear yellow fluid was removed. Supplemental albumin was given intravenously. Successful ultrasound-guided paracentesis. IR US GUIDED PARACENTESIS    Result Date: 10/14/2022  PROCEDURE: PARACENTESIS WITH IMAGE GUIDANCE US ABDOMEN LIMITED 10/14/2022 HISTORY: ORDERING SYSTEM PROVIDED HISTORY: Alcoholic cirrhosis of liver with ascites ([facility] TECHNOLOGIST PROVIDED HISTORY: Weekly due o amount drained every 2 weeks TECHNIQUE: Informed consent was obtained after a detailed explanation of the procedure including risks, benefits, and alternatives. Universal protocol was followed. A limited ultrasound of the abdomen was performed. The right abdomen was prepped and draped in sterile fashion and local anesthesia was achieved with lidocaine. An 5 Ukrainian needle sheath was advanced into ascites and paracentesis was performed. The patient tolerated the procedure well. FINDINGS: Limited ultrasound of the abdomen demonstrates ascites. A total of 9.7 L was removed. Successful ultrasound-guided therapeutic paracentesis. CT CHEST ABDOMEN PELVIS W CONTRAST Additional Contrast? Oral    Result Date: 11/6/2022  EXAMINATION: CT OF THE CHEST, ABDOMEN, AND PELVIS WITH CONTRAST 11/3/2022 4:24 pm TECHNIQUE: CT of the chest, abdomen and pelvis was performed with the administration of intravenous contrast. Multiplanar reformatted images are provided for review. Automated exposure control, iterative reconstruction, and/or weight based adjustment of the mA/kV was utilized to reduce the radiation dose to as low as reasonably achievable.  COMPARISON: 07/25/2022 HISTORY: ORDERING SYSTEM PROVIDED HISTORY: Malignant neoplasm of lower third of esophagus ([facility] [address] [address] TECHNOLOGIST PROVIDED HISTORY: STAT Creatinine as needed:->No follow up Reason for Exam: follow up, Malignant neoplasm of lower third of esophagus Additional signs and symptoms: paracentesis completed prior to scan. Relevant Medical/Surgical History: port FINDINGS: Chest: Mediastinum: Right chest wall Port-A-Cath line with tip at the cavoatrial junction. Mild cardiomegaly. No pericardial effusion. Central airways are clear. No evidence of mediastinal, hilar or axillary lymphadenopathy. There is circumferential wall thickening of the distal esophagus and gastroesophageal junction consistent with known history of esophageal malignancy. There are adjacent esophageal varices noted. No discrete paraesophageal lymphadenopathy in the posterior mediastinum. Lungs/pleura: Scattered calcified granulomas. No acute consolidation or pulmonary edema. No suspicious pulmonary nodules. No evidence of pleural effusion or pneumothorax. Mild centrilobular emphysema. Soft Tissues/Bones:  No acute abnormality of the visualized osseous structures. Chronic T12 compression fracture again demonstrated. Abdomen/Pelvis: Organs: There is nodular contour of the liver with enlargement of the caudate and left hepatic lobes consistent with hepatic cirrhosis. No definite focal hepatic mass. Cholelithiasis without evidence of acute cholecystitis. The pancreas demonstrates no acute abnormality. There is splenomegaly consistent with portal venous hypertension. The spleen measures approximately 13.6 cm in sagittal dimension. There are other stigmata of portal venous hypertension including esophageal varices and upper abdominal varices. No focal adrenal nodules. The kidneys enhance symmetrically without evidence of hydronephrosis. GI/Bowel: There is circumferential wall thickening of the gastroesophageal junction and distal esophagus consistent with esophageal cancer. No evidence of gastric outlet obstruction.   Duodenal sweep is unremarkable. No evidence of bowel obstruction. No evidence of abnormal bowel wall thickening or distention. Pelvis: The bladder is unremarkable. No pelvic mass or lymphadenopathy. Peritoneum/Retroperitoneum: There is abdominal and pelvic ascites slightly decreased in amount compared to prior examination. Ascites is consistent with hepatic cirrhosis. No evidence of pneumoperitoneum. 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 No retroperitoneal lymphadenopathy. No evidence of gastrohepatic or periportal lymphadenopathy. Bones/Soft Tissues:  Age related degenerative changes of the visualized osseous structures without focal destructive lesion. Circumferential wall thickening of the distal esophagus and gastroesophageal junction consistent with known esophageal malignancy. No significant change since prior examination. No esophageal obstruction. Findings of hepatic cirrhosis and stigmata of portal venous hypertension including esophageal varices and splenomegaly. No significant change since prior. Abdominal and pelvic ascites decreased in amount compared to prior examination. Ascites likely secondary to hepatic cirrhosis. No definite evidence of metastatic disease in the chest, abdomen or pelvis. Assessment  1. Alcoholic cirrhosis of liver with ascites ([address]    2. Anemia, unspecified type    3. Alcoholic cirrhosis, unspecified whether ascites present ([address]    4. Portal hypertensive gastropathy ([address]    5. 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 No signs of encephalopathy. No signs of bleeding. Advised to have CBC and a BMP on a weekly basis. Patient has appointment with interventional radiology on 15th of this month we are late for TIPS procedure. Hopefully this procedure will help him.     Advised to contact the office if he develop dark stools, weakness, tiredness or dizziness, confusion etc.    If he does well we will see him in the next 3 to 4 weeks. Thank you for allowing me to participate in the care of Mr. Maame Damico. For any further questions please do not hesitate to contact me. I have reviewed and agree with the ROS entered by the MA/LPN. Note is dictated utilizing voice recognition software. Unfortunately this leads to occasional typographical errors.  Please contact our office if you have any questions        Daquan Maldonado MD,FACP, [facility]  Board Certified in Gastroenterology and [address] [place] [address] Gastroenterology  Office #: [phone]

## 2022-11-08 ENCOUNTER — OFFICE VISIT (OUTPATIENT)
Dept: ONCOLOGY | Age: 62
End: 2022-11-08
Payer: MEDICARE

## 2022-11-08 ENCOUNTER — HOSPITAL ENCOUNTER (OUTPATIENT)
Age: 62
Discharge: HOME | End: 2022-11-08
Payer: MEDICARE

## 2022-11-08 VITALS
WEIGHT: 202.3 LBS | SYSTOLIC BLOOD PRESSURE: 125 MMHG | TEMPERATURE: 97.1 F | HEART RATE: 94 BPM | DIASTOLIC BLOOD PRESSURE: 76 MMHG | BODY MASS INDEX: 29.03 KG/M2

## 2022-11-08 DIAGNOSIS — R97.8 OTHER ABNORMAL TUMOR MARKERS: ICD-10-CM

## 2022-11-08 DIAGNOSIS — K70.31 ASCITES DUE TO ALCOHOLIC CIRRHOSIS (HCC): ICD-10-CM

## 2022-11-08 DIAGNOSIS — C15.9 ESOPHAGEAL ADENOCARCINOMA (HCC): Primary | ICD-10-CM

## 2022-11-08 LAB
ABSOLUTE EOS #: 0.1 K/UL (ref 0–0.4)
ABSOLUTE LYMPH #: 0.5 K/UL (ref 1–4.8)
ABSOLUTE MONO #: 0.9 K/UL (ref 0.1–1.3)
ALBUMIN SERPL-MCNC: 3.3 G/DL (ref 3.5–5.2)
ALP BLD-CCNC: 164 U/L (ref 40–129)
ALT SERPL-CCNC: 19 U/L (ref 5–41)
ANION GAP SERPL CALCULATED.3IONS-SCNC: 9 MMOL/L (ref 9–17)
AST SERPL-CCNC: 49 U/L
BASOPHILS # BLD: 1 % (ref 0–2)
BASOPHILS ABSOLUTE: 0 K/UL (ref 0–0.2)
BILIRUB SERPL-MCNC: 1.8 MG/DL (ref 0.3–1.2)
BUN BLDV-MCNC: 8 MG/DL (ref 8–23)
CALCIUM SERPL-MCNC: 8.5 MG/DL (ref 8.6–10.4)
CHLORIDE BLD-SCNC: 100 MMOL/L (ref 98–107)
CO2: 21 MMOL/L (ref 20–31)
CREAT SERPL-MCNC: 0.68 MG/DL (ref 0.7–1.2)
EOSINOPHILS RELATIVE PERCENT: 1 % (ref 0–4)
GFR SERPL CREATININE-BSD FRML MDRD: >60 ML/MIN/1.73M2
GLUCOSE BLD-MCNC: 105 MG/DL (ref 70–99)
HCT VFR BLD CALC: 31.3 % (ref 41–53)
HEMOGLOBIN: 9.9 G/DL (ref 13.5–17.5)
LYMPHOCYTES # BLD: 7 % (ref 24–44)
MCH RBC QN AUTO: 26.8 PG (ref 26–34)
MCHC RBC AUTO-ENTMCNC: 31.5 G/DL (ref 31–37)
MCV RBC AUTO: 85 FL (ref 80–100)
MONOCYTES # BLD: 14 % (ref 1–7)
PDW BLD-RTO: 17 % (ref 11.5–14.9)
PLATELET # BLD: 225 K/UL (ref 150–450)
PMV BLD AUTO: 6.7 FL (ref 6–12)
POTASSIUM SERPL-SCNC: 4.8 MMOL/L (ref 3.7–5.3)
RBC # BLD: 3.69 M/UL (ref 4.5–5.9)
SEG NEUTROPHILS: 77 % (ref 36–66)
SEGMENTED NEUTROPHILS ABSOLUTE COUNT: 4.9 K/UL (ref 1.3–9.1)
SODIUM BLD-SCNC: 130 MMOL/L (ref 135–144)
TOTAL PROTEIN: 5.7 G/DL (ref 6.4–8.3)
WBC # BLD: 6.3 K/UL (ref 3.5–11)

## 2022-11-08 PROCEDURE — 3078F DIAST BP <80 MM HG: CPT | Performed by: INTERNAL MEDICINE

## 2022-11-08 PROCEDURE — 80053 COMPREHEN METABOLIC PANEL: CPT

## 2022-11-08 PROCEDURE — 3074F SYST BP LT 130 MM HG: CPT | Performed by: INTERNAL MEDICINE

## 2022-11-08 PROCEDURE — 99211 OFF/OP EST MAY X REQ PHY/QHP: CPT | Performed by: INTERNAL MEDICINE

## 2022-11-08 PROCEDURE — 36415 COLL VENOUS BLD VENIPUNCTURE: CPT

## 2022-11-08 PROCEDURE — 85025 COMPLETE CBC W/AUTO DIFF WBC: CPT

## 2022-11-08 PROCEDURE — 99214 OFFICE O/P EST MOD 30 MIN: CPT | Performed by: INTERNAL MEDICINE

## 2022-11-08 NOTE — PROGRESS NOTES
Patient ID: Baljeet Kumar, [birth year], [id number], 60 y.o. Referred by : Dr Kelli Perry  Reason for consultation:   Esophageal cancer T2N0Mx  Stage II   started on concurrent chemoradiation preoperatively on 5/4/21  Chemoradiation completed 6/9/21  Patient had a PET scan on 7/23/2021 which showed no evidence of recurrence  Patient has been evaluated by thoracic surgeon at [facility] Dr. Rodri Linda and also hepatologist Dr. Rees Scot his case was discussed at thoracic tumor oncology board with recommendations NOT to do any surgery because of his liver failure. So surveillance recommended  He had an upper endoscopy on 8/31/21 which showed no residual cancer but showed Lezama's esophagus with high-grade dysplasia. Another endoscopy on 1/21/2022 was negative for recurrence  HISTORY OF PRESENT ILLNESS:    Oncologic History:  Baljeet Kumar is a 60 y.o. male with a history of hepatitis C, status post treatment, liver cirrhosis, history of alcohol abuse was seen during initial consultation visit for newly diagnosed esophageal cancer. Patient reportedly had \"heavy drinking alcohol in about 2016 however recently in December he had 2 beers and he presented with hematemesis. On 12/29/2020 he had upper endoscopy which showed severe portal hypertension, gastropathy. The biopsy from the GE junction showed invasive adenocarcinoma. Patient had a follow-up EGD on 2/2/2021 which confirmed esophageal adenocarcinoma. Patient had endoscopic ultrasound on 2/12/2021 which showed T2 N0 MX disease with underlying esophageal varices. In the esophagus hypoechoic lesion noted in the lower esophagus penetrating into submucosa and into muscularis propria. No lymphadenopathy noted. Patient was referred to medical oncology for further recommendations. He denies any difficulty swallowing. He does not smoke he has quit smoking in 2016. He has not drank alcohol since December.   He has a history of hepatitis C and he has received treatment. He lives by himself. He is accompanied by his ex-wife during today's office visit. INTERVAL HISTORY:  Patient is returning for follow-up visit and to discuss lab results, CT scan results and further recommendations. His CT scan showed no evidence of recurrence. Some esophageal thickening noted however he had a recent endoscopy which did not show any obvious mass and no biopsies were taken by gastroenterologist.  Patient denies any bleeding symptoms. He is following with gastroenterologist and planning to have TIPS procedure soon. He denies any abdominal pain nausea vomiting. During this visit patient's allergy, social, medical, surgical history and medications were reviewed and updated.     Past Medical History:   Diagnosis Date    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 ([address]     Alcoholic cirrhosis of liver with ascites ([address]     Anemia 04/13/2022    Anxiety     Arthritis     Back pain, chronic     dr. Dre Hinds, orthopedic, every 3-4 months, gets steroid injection    Lezama esophagus     BPH (benign prostatic hypertrophy)     Cholelithiasis     Cirrhosis ([address]     COVID-19 12/2020    pt reports he had a positive test while at [facility] in 2020, was asymptomatic    COVID-19 vaccine series completed 5/20/2021, 6/22/2021    Moderna 5/20/2021, 6/22/2021    DDD (degenerative disc disease), lumbar     Depression     Esophageal cancer ([address]     INVASIVE ADENOCARCINOMA ARISING IN TUBULAR ADENOMA WITH HIGH GRADE DYSPLASIA, ASSOCIATED WITH FOCAL INTESTINAL METAPLASIA     Esophageal varices ([address]     Fatty liver     GERD (gastroesophageal reflux disease)     Hep C w/o coma, chronic ([address]     History of alcohol abuse     6-12 beers a day; quit drinking 2019    History of blood transfusion     History of colon polyps 2016    History of tobacco abuse     [place] (hard of hearing)     Hyperlipidemia     Hypertension     Hyponatremia 07/20/2016    Port-A-Cath in place     right upper chest    Portal hypertension ([address]     Sciatica     Secondary esophageal varices ([address] 06/07/2022    Shortness of breath     Spinal stenosis     Stomach ulcer     hx of    Tubular adenoma of colon 2016, 2018    Vitamin D deficiency     Wears glasses        Past Surgical History:   Procedure Laterality Date    BUNIONECTOMY      twice on right side    BUNIONECTOMY Left     CARPAL TUNNEL RELEASE Right     COLONOSCOPY      at age 35    COLONOSCOPY  10/05/2016    polyps-pathology tubular adenoma, and abnormal looking mucosa right colon-pathology-tubular adenoma    COLONOSCOPY N/A 03/30/2018    COLONOSCOPY POLYPECTOMY COLD BIOPSY performed by Marietta Munoz MD at [place] [address]  03/30/2018    Small polyp in the sigmoid colon and excised with biopsy forceps--tubular adenoma    COLONOSCOPY N/A 04/16/2022    COLONOSCOPY POLYPECTOMY performed by Marietta Munoz MD at [facility], DIAGNOSTIC      EGD    IR PORT PLACEMENT EQUAL OR GREATER THAN 5 YEARS  04/19/2021    IR PORT PLACEMENT EQUAL OR GREATER THAN 5 YEARS 4/19/2021 [facility] SPECIAL PROCEDURES    KNEE SURGERY Left     cyst removed    NASAL SEPTUM SURGERY      NERVE BLOCK Right 11/23/2020    NERVE BLOCK RIGHT CERVICAL STEROID INJECTION  C3-C6 performed by Chico Pastor MD at [address]  01/04/2016    steroid injection C7 T1    OTHER SURGICAL HISTORY  11/21/2016    Bilateral Lumbar CACHORRO L4-L5 injections    OTHER SURGICAL HISTORY  12/19/2016    lumbar steroid injection    OTHER SURGICAL HISTORY  09/28/2018    BILATERAL L5 CACHORRO (N/A Back)    OTHER SURGICAL HISTORY Right 11/23/2020    cervical injection    PAIN MANAGEMENT PROCEDURE Left 07/09/2020    EPIDURAL STEROID INJECTION LEFT L4 L5 performed by Chico Pastor MD at [address] [place] [address] Left 07/20/2020    LEFT L4 L5 EPIDURAL STEROID INJECTION performed by Chico Pastor MD at [address] [place] [address] Bilateral 08/17/2020    LUMBAR FACET BILATERAL L2-L5 performed by Maria Antonia Boyce MD at [address] Bilateral 12/07/2020    NERVE BLOCK BILATERAL LUMBAR MEDIAL BRANCH L2-L5 performed by Maria Antonia Boyce MD at [facility]      [place]    [facility] [place] [address] [address] AA&/STRD TFRML EPI LUMBAR/SACRAL 1 LEVEL Bilateral 09/06/2018    BILATERAL L5 CACHORRO performed by Maria Antonia Boyce MD at [facility] AA&/STRD TFRML EPI LUMBAR/SACRAL 1 LEVEL N/A 09/28/2018    BILATERAL L5 CACHORRO performed by Maria Antonia Boyce MD at [facility] N/CARPAL TUNNEL SURG Right 08/29/2017    CARPAL TUNNEL RELEASE RIGHT performed by Mel Mcneill MD at [address] [place] [address] N/CARPAL TUNNEL SURG Left 10/31/2017    CARPAL TUNNEL RELEASE performed by Mel Mcneill MD at [address] [place] [address] 12/29/2020    EGD BIOPSY performed by Wang Jeffers MD at [address] [place] [address] 02/02/2021    EGD BIOPSY and spot marking performed by Be Hansen MD at [address] [place] [address] N/A 02/12/2021    ENDOSCOPIC ULTRASOUND, EGD performed by Joaquin Thomson MD at [place] [address]  02/12/2021    EGD DIAGNOSTIC ONLY performed by Joaquin Thomson MD at [facility] 1 08/31/2021    EGD BIOPSY performed by Be Hansen MD at [address] [place] [address] 01/21/2022    EGD BIOPSY performed by Be Hansen MD at [address] [place] [address] 04/15/2022    EGD ESOPHAGOGASTRODUODENOSCOPY performed by Wang Jeffers MD at [address] [place] [address] 06/06/2022    EGD BAND LIGATION performed by Benito Lennox, MD at [address] [place] [address] N/A 06/09/2022    EGD ESOPHAGOGASTRODUODENOSCOPY performed by Benito Lennox, MD at [address] [place] [address] 9/14/2022    EGD ESOPHAGOGASTRODUODENOSCOPY ENDOSCOPIC APC AT [place] [address] performed by Deysi Nieves MD at [address] [place] [address] 10/19/2022    EGD BIOPSY performed by Deborah Ingram MD at [address] [place] [address] 10/31/2022    EGD with APC performed by Deborah Ingram MD at [address] [place] [address]       No Known Allergies      Current Outpatient Medications   Medication Sig Dispense Refill    FEROSUL 325 (65 Fe) MG tablet take 1 tablet by mouth twice a day 60 tablet 5    nadolol (CORGARD) 20 MG tablet take 1 tablet by mouth once daily 30 tablet 2    midodrine (PROAMATINE) 5 MG tablet Take 2 tablets by mouth in the morning and 2 tablets at noon and 2 tablets before bedtime. 90 tablet 3    furosemide (LASIX) 20 MG tablet Take 1 tablet by mouth 2 times daily 60 tablet 3    spironolactone (ALDACTONE) 100 MG tablet Take 1 tablet by mouth every evening 30 tablet 1    ondansetron (ZOFRAN-ODT) 4 MG disintegrating tablet dissolve 1 tablet by mouth every 8 hours if needed for nausea and vomiting 10 tablet 0    lactulose (CHRONULAC) 10 GM/15ML solution take 30 milliliters by mouth three times a day 473 mL 1    FLUoxetine (PROZAC) 20 MG capsule Take 1 capsule by mouth daily 30 capsule 3    atorvastatin (LIPITOR) 20 MG tablet Take 1 tablet by mouth nightly 30 tablet 3     No current facility-administered medications for this visit.        Social History     Socioeconomic History    Marital status: Single     Spouse name: Not on file    Number of children: Not on file    Years of education: Not on file    Highest education level: Not on file   Occupational History    Not on file   Tobacco Use    Smoking status: Former     Packs/day: 1.00     Years: 45.00     Pack years: 45.00     Types: Cigarettes     Quit date: 2017     Years since quittin.8    Smokeless tobacco: Never   Vaping Use    Vaping Use: Never used   Substance and Sexual Activity    Alcohol use: Not Currently     Comment: Quit alcohol in 2019- heavier drinking prior to quitting    Drug use: Not Currently     Frequency: 1.0 times per week     Types: Cocaine     Comment: Cocaine- stopped spring 2016    Sexual activity: Yes     Partners: Female   Other Topics Concern    Not on file   Social History Narrative     in the past, retired     Social Determinants of Health     Financial Resource Strain: Low Risk     Difficulty of Paying Living Expenses: Not hard at all   Food Insecurity: No Food Insecurity    Worried About [employer] in the Last Year: Never true    [address] [employer] in the Last Year: Never true   Transportation Needs: Not on file   Physical Activity: Inactive    Days of Exercise per Week: 0 days    Minutes of Exercise per Session: 0 min   Stress: Not on file   Social Connections: Not on file   Intimate Partner Violence: Not on file   Housing Stability: Not on file       Family History   Problem Relation Age of Onset    Cancer Mother         pancreatic    Cancer Father         bone    Diabetes Sister     Asthma Brother         REVIEW OF SYSTEM:     Constitutional: No fever or chills. No night sweats, no weight loss   Eyes: No eye discharge, double vision, or eye pain   HEENT: negative for sore mouth, sore throat, hoarseness and voice change   Respiratory: negative for cough , sputum, dyspnea, wheezing, hemoptysis, chest pain   Cardiovascular: negative for chest pain, dyspnea, palpitations, orthopnea, PND   Gastrointestinal: negative for nausea, vomiting, diarrhea, constipation, abdominal pain, Dysphagia, hematemesis and hematochezia   Genitourinary: negative for frequency, dysuria, nocturia, urinary incontinence, and hematuria   Integument: negative for rash, skin lesions, bruises.    Hematologic/Lymphatic: negative for easy bruising, bleeding, lymphadenopathy, petechiae and swelling/edema   Endocrine: negative for heat or cold intolerance, tremor, weight changes, change in bowel habits and hair loss   Musculoskeletal: negative for myalgias, arthralgias, pain, joint swelling,and bone pain   Neurological: negative for headaches, dizziness, seizures, weakness, numbness       OBJECTIVE:         Vitals:    11/08/22 1153   BP: 125/76   Pulse: 94   Temp: 97.1 °F (36.2 °C)       PHYSICAL EXAM:   General appearance - well appearing, no in pain or distress   Mental status - alert and cooperative   Eyes - pupils equal and reactive, extraocular eye movements intact   Ears - bilateral TM's and external ear canals normal   Mouth - mucous membranes moist, pharynx normal without lesions   Neck - supple, no significant adenopathy   Lymphatics - no palpable lymphadenopathy, no hepatosplenomegaly   Chest - clear to auscultation, no wheezes, rales or rhonchi, symmetric air entry   Heart - normal rate, regular rhythm, normal S1, S2, no murmurs, rubs, clicks or gallops   Abdomen - soft, nontender, nondistended, no masses or organomegaly   Neurological - alert, oriented, normal speech, no focal findings or movement disorder noted   Musculoskeletal - no joint tenderness, deformity or swelling   Extremities - peripheral pulses normal, no pedal edema, no clubbing or cyanosis   Skin - normal coloration and turgor, no rashes, no suspicious skin lesions noted   LABORATORY DATA:     Lab Results   Component Value Date    WBC 7.4 11/01/2022    HGB 8.9 (L) 11/01/2022    HCT 29.3 (L) 11/01/2022    MCV 89.3 11/01/2022     (L) 11/01/2022    LYMPHOPCT 6 (L) 11/01/2022    RBC 3.28 (L) 11/01/2022    MCH 27.2 11/01/2022    MCHC 30.4 (L) 11/01/2022    RDW 16.9 (H) 11/01/2022    MONOPCT 14 (H) 11/01/2022    BASOPCT 0 11/01/2022    NEUTROABS 5.85 11/01/2022    LYMPHSABS 0.44 (L) 11/01/2022    MONOSABS 1.04 11/01/2022    EOSABS 0.07 11/01/2022    BASOSABS 0.00 11/01/2022       Chemistry        Component Value Date/Time     (L) 11/01/2022 0845    K 4.2 11/01/2022 0845     11/01/2022 0845    CO2 20 11/01/2022 0845    BUN 11 11/01/2022 0845    CREATININE 0.75 11/01/2022 0845        Component Value Date/Time    CALCIUM 8.3 (L) 11/01/2022 0845    ALKPHOS 87 10/28/2022 1631    AST 23 10/28/2022 1631    ALT 10 10/28/2022 1631    BILITOT 1.0 10/28/2022 1631        PATHOLOGY DATA:   Surgical Pathology Report  Surgical Pathology  Collected: 12/29/20 1334   Lab status: Final   Resulting lab: [address] LAB   Value: [id number]   [address]   [address] [place], [address] [place] [address]   [phone]   Fax: [phone]   SURGICAL PATHOLOGY REPORT     Patient Name: Hope Mina   MR#: [id number]   Specimen #[id number]         Final Diagnosis   GASTROESOPHAGEAL JUNCTION, BIOPSY:          INVASIVE ADENOCARCINOMA    Final Diagnosis   ESOPHAGUS, LESION AT 34 CM, BIOPSY:          INVASIVE ADENOCARCINOMA ARISING IN TUBULAR ADENOMA WITH HIGH   GRADE DYSPLASIA, ASSOCIATED WITH FOCAL INTESTINAL METAPLASIA (SEE   COMMENT)     Diagnosis Comment   The malignancy diagnosis is confirmed by review of a second   pathologist.  The result of HER2 IHC with reflex to [address] [place] [address] for   gastroesophageal adenocarcinoma will be issued in an addendum. ADDENDUM (SCM)     Date Ordered:     2/16/2021     Status: Signed Out        Date Complete:     2/16/2021     By: Warner Adames M.D. Date Reported:     2/16/2021       ADDENDUM COMMENT   This addendum is issued in order to incorporate the result of HER2 by   [address] [place] [address], performed at [address] [place] [address] ([id number], 02/16/2021). \"RESULTS:  INDETERMINATE     Interpretation:     Average HER2 signals/nucleus:  4.4   Average SUNG 17 signals/nucleus:  3.4   HER2/SUNG 17 signal ratio:  1.3   Number of Observers:  2     Even after analysis of additional cells and review of slides by a   pathologist, FISH results show a HER2/SUNG 17 ratio < 2.0 and an   average of 4-6 HER2 signals per nucleus and 3 or more SUNG 17 signals   per nucleus. The results are INDETERMINATE.        In this situation, the 2016 CAP/ASCP/ASCO guidelines recommend   selecting a different tumor block for HER2 testing, if available. \"       Of note, there is only one block available for testing of invasive   esophageal adenocarcinoma tumor cells. It was already used for HER2   IHC and HER2 FISH testing with the results issued in the addendums. IMAGING DATA:    Reviewed  CT chest abdomen pelvis 10/20/2021  Impression   1. Stable exam of the chest, abdomen and pelvis without evidence for   metastatic disease. 2.  The esophagus is decompressed. Mucosal enhancement in the distal   esophagus may be due to underlying varices. Underlying inflammation or mass   is considered less likely given the stability of this finding and recent   negative PET-CT. 3.  Morphologic findings of cirrhosis and portal hypertension again   demonstrated. No focal liver lesion identified. Trace abdominopelvic   ascites. Small varices. ASSESSMENT:    Maryuri Arredondo is a 60 y.o. male with history of hepatitis C, liver cirrhosis, history of alcohol abuse, with esophageal cancer. I reviewed the NCCN guidelines and goals of care. Clinically appears to have T2 N0 M0  Stag II, disease. Started on preoperative  concurrent chemoradiation  Now he has completed chemoradiation  A PET scan on 7/21/21 after chemoradiation showed no metabolic evidence of active neoplasm. Induced at [facility] thoracic surgery and hepatology clinic recommended surveillance. Every week    TIPS next week    During today's visit, the patient and the family had a number of reasonable questions which were answered to their satisfaction. They verbalized understanding of the information provided and they agreed to proceed as outlined above.      PLAN:   I reviewed his recent lab work, discussed diagnosis, imaging studies, prognosis and treatment recommendations  No evidence of recurrence noted on the recent scan and upper endoscopy  Continue follow-up with gastroenterologist  Return to clinic in 6 to 8 weeks with labs prior    Garrison Gama MD  Hematologist/Medical Oncologist    On this date 11/8/22  I have spent 40 minutes reviewing previous notes, test results and face to face with the patient discussing the diagnosis and importance of compliance with the treatment plan. Greater than 50% of that time was spent face-to-face with the patient in counseling and coordinating her care. This note is created with the assistance of a speech recognition program.  While intending to generate a document that actually reflects the content of the visit, the document can still have some errors including those of syntax and sound a like substitutions which may escape proof reading. It such instances, actual meaning can be extrapolated by contextual diversion.

## 2022-11-09 ENCOUNTER — TELEPHONE (OUTPATIENT)
Dept: ONCOLOGY | Age: 62
End: 2022-11-09

## 2022-11-09 NOTE — TELEPHONE ENCOUNTER
AVS from 11/8/22      RTC in 6 weeks with labs        Rv scheduled for 12/6 @ 2:00 pm with labs at md visit    Pt was given AVS and appointment schedule    Electronically signed by Antonella Ren on 11/9/2022 at 8:07 AM

## 2022-11-11 ENCOUNTER — HOSPITAL ENCOUNTER (OUTPATIENT)
Dept: INTERVENTIONAL RADIOLOGY/VASCULAR | Age: 62
Discharge: HOME | End: 2022-11-13
Payer: MEDICARE

## 2022-11-11 VITALS
SYSTOLIC BLOOD PRESSURE: 96 MMHG | RESPIRATION RATE: 14 BRPM | HEART RATE: 85 BPM | DIASTOLIC BLOOD PRESSURE: 63 MMHG | BODY MASS INDEX: 30.01 KG/M2 | TEMPERATURE: 96.8 F | WEIGHT: 209.6 LBS | OXYGEN SATURATION: 99 % | HEIGHT: 70 IN

## 2022-11-11 DIAGNOSIS — K70.31 ALCOHOLIC CIRRHOSIS OF LIVER WITH ASCITES (HCC): ICD-10-CM

## 2022-11-11 PROCEDURE — 7100000011 HC PHASE II RECOVERY - ADDTL 15 MIN

## 2022-11-11 PROCEDURE — 49083 ABD PARACENTESIS W/IMAGING: CPT

## 2022-11-11 PROCEDURE — 6360000002 HC RX W HCPCS: Performed by: RADIOLOGY

## 2022-11-11 PROCEDURE — P9047 ALBUMIN (HUMAN), 25%, 50ML: HCPCS | Performed by: RADIOLOGY

## 2022-11-11 PROCEDURE — 2709999900 IR US GUIDED PARACENTESIS

## 2022-11-11 PROCEDURE — 7100000010 HC PHASE II RECOVERY - FIRST 15 MIN

## 2022-11-11 RX ORDER — SODIUM CHLORIDE 0.9 % (FLUSH) 0.9 %
5-40 SYRINGE (ML) INJECTION EVERY 12 HOURS SCHEDULED
Status: DISCONTINUED | OUTPATIENT
Start: 2022-11-11 | End: 2022-11-14 | Stop reason: HOSPADM

## 2022-11-11 RX ORDER — ALBUMIN (HUMAN) 12.5 G/50ML
25 SOLUTION INTRAVENOUS ONCE
Status: COMPLETED | OUTPATIENT
Start: 2022-11-11 | End: 2022-11-11

## 2022-11-11 RX ORDER — SODIUM CHLORIDE 9 MG/ML
INJECTION, SOLUTION INTRAVENOUS CONTINUOUS
Status: DISCONTINUED | OUTPATIENT
Start: 2022-11-11 | End: 2022-11-14 | Stop reason: HOSPADM

## 2022-11-11 RX ORDER — SODIUM CHLORIDE 9 MG/ML
INJECTION, SOLUTION INTRAVENOUS PRN
Status: DISCONTINUED | OUTPATIENT
Start: 2022-11-11 | End: 2022-11-14 | Stop reason: HOSPADM

## 2022-11-11 RX ORDER — ACETAMINOPHEN 325 MG/1
650 TABLET ORAL EVERY 4 HOURS PRN
Status: DISCONTINUED | OUTPATIENT
Start: 2022-11-11 | End: 2022-11-14 | Stop reason: HOSPADM

## 2022-11-11 RX ORDER — HEPARIN SODIUM (PORCINE) LOCK FLUSH IV SOLN 100 UNIT/ML 100 UNIT/ML
100 SOLUTION INTRAVENOUS PRN
Status: DISCONTINUED | OUTPATIENT
Start: 2022-11-11 | End: 2022-11-14 | Stop reason: HOSPADM

## 2022-11-11 RX ORDER — SODIUM CHLORIDE 0.9 % (FLUSH) 0.9 %
5-40 SYRINGE (ML) INJECTION PRN
Status: DISCONTINUED | OUTPATIENT
Start: 2022-11-11 | End: 2022-11-14 | Stop reason: HOSPADM

## 2022-11-11 RX ADMIN — ALBUMIN (HUMAN) 25 G: 0.25 INJECTION, SOLUTION INTRAVENOUS at 13:49

## 2022-11-11 RX ADMIN — ALBUMIN (HUMAN) 25 G: 0.25 INJECTION, SOLUTION INTRAVENOUS at 14:05

## 2022-11-11 RX ADMIN — ALBUMIN (HUMAN) 25 G: 0.25 INJECTION, SOLUTION INTRAVENOUS at 14:19

## 2022-11-11 RX ADMIN — HEPARIN 100 UNITS: 100 SYRINGE at 15:00

## 2022-11-11 ASSESSMENT — PAIN - FUNCTIONAL ASSESSMENT: PAIN_FUNCTIONAL_ASSESSMENT: NONE - DENIES PAIN

## 2022-11-11 NOTE — DISCHARGE INSTRUCTIONS
DISCHARGE INSTRUCTIONS FOR PARACENTESIS    In order to continue your care at home, please follow the instructions below. Medications    Use over-the-counter pain medication as directed on bottle for pain control    Post Procedure Site/Care/Activity  For up to 2 days after the procedure, you may have a small amount of clear fluid coming out of the site where the needle was inserted, especially if you had a lot of fluid removed. You may need to change the bandage on the site. Do not lie totally flat until morning. You can do your normal activities after the procedure, unless instructed differently. Call your doctor or seek immediate medical care if:   You have symptoms of infection, such as increased pain, swelling, warmth, or redness, red streaks or pus. An oral temperature (by mouth) is 101 degrees or higher (fever), chills, fever. You are dizzy or lightheaded, or you feel like you may faint. You have new or worse belly pain. Watch closely for changes in your health, and be sure to contact your doctor if:   Fluid builds up in your belly again. You do not get better as expected.       IF YOU CANNOT REACH THE DOCTOR, GO TO THE NEAREST [address] OR CALL 911    Phone: Interventional Radiology   [phone] Dr. Antione Roach  After hours Radiology   [phone]     11.2 Liters removed

## 2022-11-11 NOTE — H&P
HISTORY and [address]       NAME:  Sandeep Zamora  MRN: [id number]   YOB: 1960   Date: 11/11/2022   Age: 60 y.o. Gender: male     H&P Update Note    H&P from 10/28/2022 reviewed and updated. Patient examined. INTERVAL HISTORY:     Pt present today ,he is undergoing for for paracentesis treatment for alcoholic cirrhosis    Patient last had last paracentesis on 11/3/22 with  A total of 10.5 L was removed. Patient was given supplement IV albumin. Patient reports significant relief of symptoms. Patient had hospitalization 10/29/22 discharged 11/1/22  See portion of the note below per Dr Kashif Bellamy  discharged note on 11/1/2022   Hospital Stay:     Hospital Course:  Sandeep Zamora is a 60 y.o. male who was admitted for the management of  GI bleed , presented to ER with Abnormal Lab (HGB 5.1)     Pt is a 59 yo M who presents with PMHx of esophageal AC s/p chemoradiation, Hep C, alcoholic liver cirrhosis with portal hypertension and variceal bleeding s/p banding, with multiple transfusions. Patient reported that he gets paracentesis for ascites once every week, and hemoglobin levels once a week as well due to recurrent anemia. Pt presented with abnormal lab (HGB 5.1) and admitted for acute blood loss anemia 2/2 upper GI bleed. Transfused with 3 unit pRBC. EGD was done and showed telangiectasia related to radiation for esophageal cancer. Pt was placed on octreotide and PPI infusion, and IV rocephin as a prophylactic measure for underlying risk of SBP. Started on nadolol, lactulose, and lasix for liver cirrhosis. Once Hb was stable, pt was discharged and informed to f/u on TIPS procedure and paracentesis outpatient. Update HPI:  Patient admits to diffuse abdominal pain described as a pressure (8/10), without radiation. Patient denies shortness of breath, Patient is  easy bleeding/bruising. Patient has no swelling in the lower legs bilaterally.  No other concerns today, no recent fever/chills, no chest pain, no shortness of breath currently. No interval changes to past medical history, social history, family history. Review of systems as stated above and otherwise negative. GENERAL PHYSICAL EXAM:     Vitals: /81   Pulse 82   Temp 97 °F (36.1 °C) (Infrared)   Resp 16   Ht 5' 10\" (1.778 m)   Wt 209 lb 9.6 oz (95.1 kg)   SpO2 100%   BMI 30.07 kg/m²  Body mass index is 30.07 kg/m². GENERAL APPEARANCE:  Patient is alert and oriented. Does not appear to be distress or pain at this time. SKIN:  Normal temperature, turgor and texture. No cyanosis or jaundice. Bruising present bilateral arms  pt has subdermal port to right upper chest. No pain w /light palpation. HEAD:  Normocephalic, atraumatic. EYES:  Pupils equal, reactive to light and accomodation. Conjunctiva clear. EOMs intact bilaterally. THROAT:  Mucous membranes moist. No tonsillar erythema or exudates. NECK:  No stiffness, trachea central.  No palpable masses. HEART:   Regular rate and rhythm. No murmurs. LUNGS:  Equal on expansion. Clear to auscultation bilaterally with no adventitious sounds. ABDOMEN:  Soft on palpation. No localized tenderness, guarding or rigidity. No palpable organomegaly, there is distension , due to ascites      LYMPHATICS:  No cervical lymphadenopathy. EXTREMITIES:  Symmetrical with no pretibial/pedal edema. No discoloration or ulcerations. No warmth, tenderness, erythema noted in lower legs bilaterally. NEUROLOGIC:  The patient is conscious, alert, oriented. No apparent focal sensory deficits. No motor deficits, muscle strength equal bilaterally. No facial droop, tongue protrudes centrally, no slurring of the speech. Cranial nerve exam reveals no deficits. No interval changes. I concur with the findings.      MASSIMO Galvez - CNP on 11/11/2022 at 12:41 PM       HISTORY and [facility] [address]         NAME:  Yaneth Akins  MRN: [id number]   YOB: 1960   Date: 10/28/2022   Age: 60 y.o. Gender: male         COMPLAINT AND PRESENT HISTORY:      Yaneth Akins is 60 y.o.,  male, presents for paracentesis treatment for alcoholic cirrhosis      Primary dx:   HPI:  Yaneth Akins is 60 y.o.,  male, here today for paracentesis. Patient is receiving treatments paracentesis treatment for alcoholic cirrhosis  Patient has history of liver cirrhosis with ascites. Patient last had last paracentesis on 10/21/22 with  A total of 7.9 L of clear yellow fluid was removed. Patient admits to diffuse abdominal pain described as a pressure (8-9/10), with radiation ot his chest   Patient admits to associated shortness of breath, improved by sitting in chair, worse when lying flat. Patient is  easy bleeding/bruising. Patient  has no swelling in the lower legs bilaterally. No other concerns today, no recent fever/chills, no chest pain, no shortness of breath currently. Review of additional SIGNIFICANT medical hx (see chart for additional detail, including current medications / see ROS for current s/s):      ESOPHAGEAL CA, HEP. C, HX OF ETOH ABUSE,  PORTAL HTN, ESOPHAGEAL VARICES, CIRRHOSIS, HTN, HLD, JIMENES ESOPHAGUS, COVID-19, SOB. EKG 9/12/22:  Normal sinus rhythm  Low voltage QRS  Nonspecific ST and T wave abnormality  Abnormal ECG  When compared with ECG of 05-JUL-2022 14:27,  Previous ECG has undetermined rhythm, needs review     ECHO 12/20/21:  Summary  Left ventricle is normal in size. Mild left ventricular hypertrophy. Global left ventricular systolic function is normal. Estimated LV EF 60-65  %. Left atrium is mildly dilated. No significant valvular regurgitation or stenosis seen. No significant pericardial effusion is seen. Normal aortic root dimension.      NPO status: pt NPO since the past midnight   Medications taken TODAY (with sip of water): pt took all his am medication with sip of water   Anticoagulation status: none  Denies personal hx of blood clots. Denies personal hx of MRSA infection. Denies any personal or family hx of previous complications w/anesthesia.      PAST MEDICAL HISTORY      Past Medical History        Past Medical History:   Diagnosis Date    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 ([address]      Alcoholic cirrhosis of liver with ascites ([address]      Anemia 04/13/2022    Anxiety      Arthritis      Back pain, chronic       dr. Heidi Gamez, orthopedic, every 3-4 months, gets steroid injection    Lezama esophagus      BPH (benign prostatic hypertrophy)      Cholelithiasis      Cirrhosis ([address]      COVID-19 12/2020     pt reports he had a positive test while at [facility] in 2020, was asymptomatic    COVID-19 vaccine series completed 5/20/2021, 6/22/2021     Moderna 5/20/2021, 6/22/2021    DDD (degenerative disc disease), lumbar      Depression      Esophageal cancer ([address]       INVASIVE ADENOCARCINOMA ARISING IN TUBULAR ADENOMA WITH HIGH GRADE DYSPLASIA, ASSOCIATED WITH FOCAL INTESTINAL METAPLASIA     Esophageal varices ([address]      Fatty liver      GERD (gastroesophageal reflux disease)      Hep C w/o coma, chronic ([address]      History of alcohol abuse       6-12 beers a day; quit drinking 2019    History of blood transfusion      History of colon polyps 2016    History of tobacco abuse      [place] (hard of hearing)      Hyperlipidemia      Hypertension      Hyponatremia 07/20/2016    Port-A-Cath in place       right upper chest    Portal hypertension ([address]      Sciatica      Secondary esophageal varices ([address] 06/07/2022    Shortness of breath      Spinal stenosis      Stomach ulcer       hx of    Tubular adenoma of colon 2016, 2018    Vitamin D deficiency      Wears glasses              SURGICAL HISTORY        Past Surgical History         Past Surgical History:   Procedure Laterality Date    BUNIONECTOMY         twice on right side BUNIONECTOMY Left      CARPAL TUNNEL RELEASE Right      COLONOSCOPY         at age 35    COLONOSCOPY   10/05/2016     polyps-pathology tubular adenoma, and abnormal looking mucosa right colon-pathology-tubular adenoma    COLONOSCOPY N/A 03/30/2018     COLONOSCOPY POLYPECTOMY COLD BIOPSY performed by Sony Rose MD at [place] [address]   03/30/2018     Small polyp in the sigmoid colon and excised with biopsy forceps--tubular adenoma    COLONOSCOPY N/A 04/16/2022     COLONOSCOPY POLYPECTOMY performed by Sony Rose MD at [facility], COLON, DIAGNOSTIC         EGD    IR PORT PLACEMENT EQUAL OR GREATER THAN 5 YEARS   04/19/2021     IR PORT PLACEMENT EQUAL OR GREATER THAN 5 YEARS 4/19/2021 [facility]    KNEE SURGERY Left       cyst removed    NASAL SEPTUM SURGERY        NERVE BLOCK Right 11/23/2020     NERVE BLOCK RIGHT CERVICAL STEROID INJECTION  C3-C6 performed by Sebas Bell MD at [address] [place] [address]   01/04/2016     steroid injection C7 T1    OTHER SURGICAL HISTORY   11/21/2016     Bilateral Lumbar CACHORRO L4-L5 injections    OTHER SURGICAL HISTORY   12/19/2016     lumbar steroid injection    OTHER SURGICAL HISTORY   09/28/2018     BILATERAL L5 CACHORRO (N/A Back)    OTHER SURGICAL HISTORY Right 11/23/2020     cervical injection    PAIN MANAGEMENT PROCEDURE Left 07/09/2020     EPIDURAL STEROID INJECTION LEFT L4 L5 performed by Sebas Bell MD at [address] [place] [address] Left 07/20/2020     LEFT L4 L5 EPIDURAL STEROID INJECTION performed by Sebas Bell MD at [address] [place] [address] Bilateral 08/17/2020     LUMBAR FACET BILATERAL L2-L5 performed by Sebas Bell MD at [address] [place] [address] Bilateral 12/07/2020     NERVE BLOCK BILATERAL LUMBAR MEDIAL BRANCH L2-L5 performed by Sebas Bell MD at [address] [place] [address] [address] AA&/STRD TFRML EPI LUMBAR/SACRAL 1 LEVEL Bilateral 09/06/2018     BILATERAL L5 CACHORRO performed by Zach Spence MD at [facility] AA&/STRD TFRML EPI LUMBAR/SACRAL 1 LEVEL N/A 09/28/2018     BILATERAL L5 CACHORRO performed by Zach Spence MD at [address] [place] [address] N/CARPAL TUNNEL SURG Right 08/29/2017     CARPAL TUNNEL RELEASE RIGHT performed by Hemant Jang MD at [address] [place] [address] N/CARPAL TUNNEL SURG Left 10/31/2017     CARPAL TUNNEL RELEASE performed by Hemant Jang MD at [place] [address] 12/29/2020     EGD BIOPSY performed by Dawit Mitchell MD at [place] [address] 02/02/2021     EGD BIOPSY and spot marking performed by Renay Smiley MD at [place] [address] N/A 02/12/2021     ENDOSCOPIC ULTRASOUND, EGD performed by Marcus Santillan MD at [place] [address]   02/12/2021     EGD DIAGNOSTIC ONLY performed by Marcus Santillan MD at [place] [address] N/A 08/31/2021     EGD BIOPSY performed by Renay Smiley MD at [place] [address] 01/21/2022     EGD BIOPSY performed by Renay Smiley MD at [place] [address] N/A 04/15/2022     EGD ESOPHAGOGASTRODUODENOSCOPY performed by Dawit Mitchell MD at [place] [address] 06/06/2022     EGD BAND LIGATION performed by Merari Ron MD at [place] [address] N/A 06/09/2022     EGD ESOPHAGOGASTRODUODENOSCOPY performed by Merari Ron MD at [place] [address] N/A 9/14/2022     EGD ESOPHAGOGASTRODUODENOSCOPY ENDOSCOPIC APC AT GE JUNCTION performed by Coreen Seymour MD at [place] [address] N/A 10/19/2022     EGD BIOPSY performed by Renay Smiley MD at [facility]        Family History         Family History   Problem Relation Age of Onset    Cancer Mother           pancreatic    Cancer Father           bone    Diabetes Sister      Asthma Brother              SOCIAL HISTORY        Social History   Social History            Socioeconomic History    Marital status: Single       Spouse name: None    Number of children: None    Years of education: None    Highest education level: None   Tobacco Use    Smoking status: Former       Packs/day: 1.00       Years: 45.00       Pack years: 45.00       Types: Cigarettes       Quit date: 2017       Years since quittin.7    Smokeless tobacco: Never   Vaping Use    Vaping Use: Never used   Substance and Sexual Activity    Alcohol use: Not Currently       Comment: Quit alcohol in 2019- heavier drinking prior to quitting    Drug use: Not Currently       Frequency: 1.0 times per week       Types: Cocaine       Comment: Cocaine- stopped spring 2016    Sexual activity: Yes       Partners: Female   Social History Narrative      in the past, retired      Social Determinants of Health          Financial Resource Strain: Low Risk     Difficulty of Paying Living Expenses: Not hard at all   Food Insecurity: No Food Insecurity    Worried About [employer] in the Last Year: Never true    [employer] in the Last Year: Never true   Physical Activity: Inactive    Days of Exercise per Week: 0 days    Minutes of Exercise per Session: 0 min               REVIEW OF SYSTEMS       No Known Allergies            Current Outpatient Medications on File Prior to Encounter   Medication Sig Dispense Refill    FEROSUL 325 (65 Fe) MG tablet take 1 tablet by mouth twice a day 60 tablet 5    nadolol (CORGARD) 20 MG tablet take 1 tablet by mouth once daily 30 tablet 2    midodrine (PROAMATINE) 5 MG tablet Take 2 tablets by mouth in the morning and 2 tablets at noon and 2 tablets before bedtime.  90 tablet 3    furosemide (LASIX) 20 MG tablet Take 1 tablet by mouth 2 times daily 60 tablet 3    spironolactone (ALDACTONE) 100 MG tablet Take 1 tablet by mouth every evening (Patient not taking: Reported on 10/21/2022) 30 tablet 1    ondansetron (ZOFRAN-ODT) 4 MG disintegrating tablet dissolve 1 tablet by mouth every 8 hours if needed for nausea and vomiting 10 tablet 0    lactulose (CHRONULAC) 10 GM/15ML solution take 30 milliliters by mouth three times a day 473 mL 1    FLUoxetine (PROZAC) 20 MG capsule Take 1 capsule by mouth daily 30 capsule 3    atorvastatin (LIPITOR) 20 MG tablet Take 1 tablet by mouth nightly 30 tablet 3      No current facility-administered medications on file prior to encounter. Review of Systems   Constitutional: Negative. HENT: Negative. Eyes:  Positive for visual disturbance. Eye glasses    Respiratory:  Positive for shortness of breath. Cardiovascular: Negative. Gastrointestinal:  Positive for abdominal distention. Genitourinary: Negative. Musculoskeletal: Negative. Hematological:  Bruises/bleeds easily. Bilateral arms    Psychiatric/Behavioral: Negative. GENERAL PHYSICAL EXAM      Vitals: /61   Pulse 71   Temp 97.1 °F (36.2 °C) (Infrared)   Resp 12   SpO2 97%  There is no height or weight on file to calculate BMI. GENERAL APPEARANCE:   Sana Irving is 60 y.o.,  male, not obese, nourished, conscious, alert. Does not appear to be distress or pain at this time. Physical Exam  Constitutional:       Appearance: Normal appearance. He is normal weight. HENT:      Head: Normocephalic. Right Ear: External ear normal.      Left Ear: External ear normal.      Nose: Nose normal.      Mouth/Throat:      Mouth: Mucous membranes are moist.      Comments: Multiple dental caries upper and lower   Eyes:      General:         Right eye: No discharge. Left eye: No discharge. Cardiovascular:      Rate and Rhythm: Normal rate and regular rhythm. Pulses: Normal pulses.            Radial pulses are 2+ on the right side and 2+ on the left side. Dorsalis pedis pulses are 2+ on the right side and 2+ on the left side. Posterior tibial pulses are 2+ on the right side and 2+ on the left side. Heart sounds: Normal heart sounds. Pulmonary:      Effort: Pulmonary effort is normal.      Breath sounds: Normal breath sounds. No wheezing or rales. Abdominal:      General: Bowel sounds are normal. There is distension. Palpations: Abdomen is soft. There is no mass. Tenderness: There is no abdominal tenderness. Comments: Ascites    Musculoskeletal:         General: No swelling or tenderness. Cervical back: Normal range of motion and neck supple. Right lower leg: No edema. Left lower leg: No edema. Skin:     General: Skin is warm and dry. Findings: Bruising present. Comments: Bilateral arms    Neurological:      General: No focal deficit present. Mental Status: He is alert and oriented to person, place, and time. Motor: No weakness.       Gait: Gait normal.   Psychiatric:         Mood and Affect: Mood normal.         Behavior: Behavior normal.                 PROVISIONAL DIAGNOSES / SURGERY:       Paracentesis      Alcoholic cirrhosis           Patient Active Problem List     Diagnosis Date Noted    GI bleed 09/13/2022    Secondary esophageal varices ([address] 06/07/2022    Esophageal polyp 06/07/2022    Drop in hemoglobin 06/03/2022    Portal hypertension (HCC)      Shortness of breath      Ascites 04/26/2022    Acute kidney failure, unspecified ([address] 04/21/2022    Muscle weakness (generalized) 07/28/2016    Other abnormalities of gait and mobility 07/28/2016    Anemia 04/13/2022    Acute kidney injury ([address] 04/13/2022    Esophageal adenocarcinoma [facility]      Low hemoglobin 12/20/2021    Symptomatic anemia, microcytic, acute 12/20/2021    Hypotension 12/20/2021    Former smoker, 50+ pack years, quit 2016 12/20/2021    HLD (hyperlipidemia) 12/20/2021    Abnormal findings on diagnostic imaging of spine 12/14/2021    Cervical spinal stenosis 12/14/2021    Spinal stenosis of lumbar region with neurogenic claudication 12/14/2021    Severe comorbid illness 11/30/2021    Gait instability 11/30/2021    Current smoker 04/05/2021    COVID-19 02/23/2021    Anxiety 02/23/2021    Malignant neoplasm of lower third of esophagus ([address]      Hypocalcemia 12/26/2020    Hypophosphatemia 12/26/2020    Gastrointestinal hemorrhage with melena      Alcohol abuse      Altered mental status      Acute gastrointestinal bleeding 12/23/2020    Thrombocytopenia ([address] 12/23/2020    Hepatitis C virus infection resolved after antiviral drug therapy 12/23/2020    Cervical facet syndrome 11/23/2020    Lumbar facet arthropathy 08/17/2020    Elevated LFTs 08/12/2020    Seasonal allergies 08/12/2020    S/P epidural steroid injection 08/05/2020    Essential hypertension 04/24/2019    Recurrent major depressive disorder in partial remission ([address] 04/24/2019    Pure hypercholesterolemia 02/04/2019    Hypokalemia 02/04/2019    Vitamin D deficiency 09/20/2017    History of hepatitis C 09/11/2017    Ganglion cyst 05/31/2017    Carpal tunnel syndrome of right wrist 05/31/2017    Tinnitus 03/23/2017    Eustachian tube dysfunction 03/23/2017    Lumbar radiculitis 11/08/2016    Lumbar disc herniation 11/08/2016    Gynecomastia, male 10/26/2016    Depression 10/13/2016    Vertebrogenic low back pain 10/06/2016    DDD (degenerative disc disease), lumbar 10/06/2016    Hepatic cirrhosis ([address] 09/15/2016    Psychophysiologic insomnia 09/14/2016    Cirrhosis ([address]      Hep C w/o coma, chronic (HCC)      Fatty liver      Calculus of gallbladder without cholecystitis 08/10/2016    Chronic viral hepatitis B without delta agent and without coma ([address] 07/22/2016    Hypomagnesemia      Alcohol withdrawal syndrome without complication ([address] 88/33/8916    Cervical radicular pain 01/04/2016    Tubular adenoma of colon 01/01/2016    History of colon polyps 01/01/2016    Back pain, chronic 04/19/2012    Hearing difficulty 04/19/2012    GERD (gastroesophageal reflux disease) 04/19/2012            MASSIMO Hadley CNP on 10/28/2022 at 1:02 PM

## 2022-11-11 NOTE — PROGRESS NOTES
Patient tolerated procedure well without distress. 11.2 L of cloudy, yellow fluid removed. Area cleansed & dry dressing applied. Transport notified to take patient back to [facility]. JERI Evangelista updated.

## 2022-11-14 ENCOUNTER — HOSPITAL ENCOUNTER (OUTPATIENT)
Age: 62
Discharge: HOME | End: 2022-11-14
Payer: MEDICARE

## 2022-11-14 DIAGNOSIS — K70.31 ALCOHOLIC CIRRHOSIS OF LIVER WITH ASCITES (HCC): ICD-10-CM

## 2022-11-14 LAB
ABSOLUTE EOS #: 0.1 K/UL (ref 0–0.4)
ABSOLUTE LYMPH #: 0.5 K/UL (ref 1–4.8)
ABSOLUTE MONO #: 0.9 K/UL (ref 0.1–1.3)
ALBUMIN SERPL-MCNC: 3.3 G/DL (ref 3.5–5.2)
ALP BLD-CCNC: 136 U/L (ref 40–129)
ALT SERPL-CCNC: 11 U/L (ref 5–41)
ANION GAP SERPL CALCULATED.3IONS-SCNC: 10 MMOL/L (ref 9–17)
AST SERPL-CCNC: 27 U/L
BASOPHILS # BLD: 0 % (ref 0–2)
BASOPHILS ABSOLUTE: 0 K/UL (ref 0–0.2)
BILIRUB SERPL-MCNC: 0.9 MG/DL (ref 0.3–1.2)
BILIRUBIN DIRECT: 0.3 MG/DL
BILIRUBIN, INDIRECT: 0.6 MG/DL (ref 0–1)
BUN BLDV-MCNC: 8 MG/DL (ref 8–23)
CALCIUM SERPL-MCNC: 8.5 MG/DL (ref 8.6–10.4)
CHLORIDE BLD-SCNC: 98 MMOL/L (ref 98–107)
CO2: 23 MMOL/L (ref 20–31)
CREAT SERPL-MCNC: 0.65 MG/DL (ref 0.7–1.2)
EOSINOPHILS RELATIVE PERCENT: 1 % (ref 0–4)
GFR SERPL CREATININE-BSD FRML MDRD: >60 ML/MIN/1.73M2
GLUCOSE BLD-MCNC: 110 MG/DL (ref 70–99)
HCT VFR BLD CALC: 29.5 % (ref 41–53)
HEMOGLOBIN: 9.7 G/DL (ref 13.5–17.5)
LYMPHOCYTES # BLD: 8 % (ref 24–44)
MCH RBC QN AUTO: 28 PG (ref 26–34)
MCHC RBC AUTO-ENTMCNC: 32.8 G/DL (ref 31–37)
MCV RBC AUTO: 85.4 FL (ref 80–100)
MONOCYTES # BLD: 13 % (ref 1–7)
PDW BLD-RTO: 16.9 % (ref 11.5–14.9)
PLATELET # BLD: 221 K/UL (ref 150–450)
PMV BLD AUTO: 7 FL (ref 6–12)
POTASSIUM SERPL-SCNC: 3.8 MMOL/L (ref 3.7–5.3)
RBC # BLD: 3.46 M/UL (ref 4.5–5.9)
SEG NEUTROPHILS: 78 % (ref 36–66)
SEGMENTED NEUTROPHILS ABSOLUTE COUNT: 5.1 K/UL (ref 1.3–9.1)
SODIUM BLD-SCNC: 131 MMOL/L (ref 135–144)
TOTAL PROTEIN: 5.6 G/DL (ref 6.4–8.3)
WBC # BLD: 6.6 K/UL (ref 3.5–11)

## 2022-11-14 PROCEDURE — 36415 COLL VENOUS BLD VENIPUNCTURE: CPT

## 2022-11-14 PROCEDURE — 85025 COMPLETE CBC W/AUTO DIFF WBC: CPT

## 2022-11-14 PROCEDURE — 80053 COMPREHEN METABOLIC PANEL: CPT

## 2022-11-14 PROCEDURE — 82248 BILIRUBIN DIRECT: CPT

## 2022-11-15 ENCOUNTER — HOSPITAL ENCOUNTER (OUTPATIENT)
Dept: INTERVENTIONAL RADIOLOGY/VASCULAR | Age: 62
Discharge: HOME | End: 2022-11-17
Payer: MEDICARE

## 2022-11-15 ENCOUNTER — HOSPITAL ENCOUNTER (OUTPATIENT)
Age: 62
Discharge: HOME | End: 2022-11-15
Payer: MEDICARE

## 2022-11-15 VITALS
SYSTOLIC BLOOD PRESSURE: 126 MMHG | DIASTOLIC BLOOD PRESSURE: 70 MMHG | RESPIRATION RATE: 20 BRPM | OXYGEN SATURATION: 100 % | HEART RATE: 88 BPM

## 2022-11-15 DIAGNOSIS — R18.8 OTHER ASCITES: ICD-10-CM

## 2022-11-15 DIAGNOSIS — K76.6 PORTAL HYPERTENSION (HCC): ICD-10-CM

## 2022-11-15 DIAGNOSIS — K74.60 HEPATIC CIRRHOSIS, UNSPECIFIED HEPATIC CIRRHOSIS TYPE, UNSPECIFIED WHETHER ASCITES PRESENT (HCC): ICD-10-CM

## 2022-11-15 DIAGNOSIS — K92.2 GASTROINTESTINAL HEMORRHAGE, UNSPECIFIED GASTROINTESTINAL HEMORRHAGE TYPE: ICD-10-CM

## 2022-11-15 LAB
ABSOLUTE EOS #: 0.07 K/UL (ref 0–0.4)
ABSOLUTE IMMATURE GRANULOCYTE: 0 K/UL (ref 0–0.3)
ABSOLUTE LYMPH #: 0.52 K/UL (ref 1–4.8)
ABSOLUTE MONO #: 0.78 K/UL (ref 0.1–0.8)
ALBUMIN SERPL-MCNC: 3.4 G/DL (ref 3.5–5.2)
ALBUMIN/GLOBULIN RATIO: 1.4 (ref 1–2.5)
ALP BLD-CCNC: 132 U/L (ref 40–129)
ALT SERPL-CCNC: 11 U/L (ref 5–41)
ANION GAP SERPL CALCULATED.3IONS-SCNC: 11 MMOL/L (ref 9–17)
AST SERPL-CCNC: 30 U/L
BASOPHILS # BLD: 0 % (ref 0–2)
BASOPHILS ABSOLUTE: 0 K/UL (ref 0–0.2)
BILIRUB SERPL-MCNC: 1.1 MG/DL (ref 0.3–1.2)
BUN BLDV-MCNC: 7 MG/DL (ref 8–23)
CALCIUM SERPL-MCNC: 8.5 MG/DL (ref 8.6–10.4)
CHLORIDE BLD-SCNC: 96 MMOL/L (ref 98–107)
CO2: 23 MMOL/L (ref 20–31)
CREAT SERPL-MCNC: 0.63 MG/DL (ref 0.7–1.2)
EOSINOPHILS RELATIVE PERCENT: 1 % (ref 1–4)
GFR SERPL CREATININE-BSD FRML MDRD: >60 ML/MIN/1.73M2
GLUCOSE BLD-MCNC: 123 MG/DL (ref 70–99)
HCT VFR BLD CALC: 32 % (ref 40.7–50.3)
HEMOGLOBIN: 9.5 G/DL (ref 13–17)
IMMATURE GRANULOCYTES: 0 %
INR BLD: 1.1
LYMPHOCYTES # BLD: 8 % (ref 24–44)
MCH RBC QN AUTO: 26.2 PG (ref 25.2–33.5)
MCHC RBC AUTO-ENTMCNC: 29.7 G/DL (ref 28.4–34.8)
MCV RBC AUTO: 88.4 FL (ref 82.6–102.9)
MONOCYTES # BLD: 12 % (ref 1–7)
MORPHOLOGY: ABNORMAL
NRBC AUTOMATED: 0 PER 100 WBC
PARTIAL THROMBOPLASTIN TIME: 25.6 SEC (ref 20.5–30.5)
PDW BLD-RTO: 16.1 % (ref 11.8–14.4)
PLATELET # BLD: 217 K/UL (ref 138–453)
PMV BLD AUTO: 9.5 FL (ref 8.1–13.5)
POTASSIUM SERPL-SCNC: 4 MMOL/L (ref 3.7–5.3)
PROTHROMBIN TIME: 11.9 SEC (ref 9.1–12.3)
RBC # BLD: 3.62 M/UL (ref 4.21–5.77)
SEG NEUTROPHILS: 79 % (ref 36–66)
SEGMENTED NEUTROPHILS ABSOLUTE COUNT: 5.13 K/UL (ref 1.8–7.7)
SODIUM BLD-SCNC: 130 MMOL/L (ref 135–144)
TOTAL PROTEIN: 5.8 G/DL (ref 6.4–8.3)
WBC # BLD: 6.5 K/UL (ref 3.5–11.3)

## 2022-11-15 PROCEDURE — 85610 PROTHROMBIN TIME: CPT

## 2022-11-15 PROCEDURE — 80053 COMPREHEN METABOLIC PANEL: CPT

## 2022-11-15 PROCEDURE — 36415 COLL VENOUS BLD VENIPUNCTURE: CPT

## 2022-11-15 PROCEDURE — 85025 COMPLETE CBC W/AUTO DIFF WBC: CPT

## 2022-11-15 PROCEDURE — 85730 THROMBOPLASTIN TIME PARTIAL: CPT

## 2022-11-15 ASSESSMENT — PAIN - FUNCTIONAL ASSESSMENT: PAIN_FUNCTIONAL_ASSESSMENT: NONE - DENIES PAIN

## 2022-11-15 NOTE — FLOWSHEET NOTE
04/13/22 1824   Treatment Team Notification   Reason for Communication Critical results   Type of Critical Result Laboratory   Critical Lab Result Type Electrolytes   Team Member Name Internal Medicine Resident Team   Treatment Team Role Attending Provider   Method of Communication Call   Provider notified of sodium level of 120. Declines

## 2022-11-15 NOTE — PROGRESS NOTES
Here for TIPS consult. Vitals stable. See Epic for medications and history. Patient poor historian. Patient is here with wife. He is [place]. /70 HR 88 RR 20 Sat 100% on room air.

## 2022-11-15 NOTE — H&P
Yan Griffin is an 60 yrs  male. Past Medical History:   Diagnosis Date    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 ([address]     Alcoholic cirrhosis of liver with ascites ([address]     Anemia 04/13/2022    Anxiety     Arthritis     Back pain, chronic     dr. Shara Lagunas, orthopedic, every 3-4 months, gets steroid injection    Lezama esophagus     BPH (benign prostatic hypertrophy)     Cholelithiasis     Cirrhosis ([address]     COVID-19 12/2020    pt reports he had a positive test while at [facility] in 2020, was asymptomatic    COVID-19 vaccine series completed 5/20/2021, 6/22/2021    Moderna 5/20/2021, 6/22/2021    DDD (degenerative disc disease), lumbar     Depression     Esophageal cancer ([address]     INVASIVE ADENOCARCINOMA ARISING IN TUBULAR ADENOMA WITH HIGH GRADE DYSPLASIA, ASSOCIATED WITH FOCAL INTESTINAL METAPLASIA     Esophageal varices ([address]     Fatty liver     GERD (gastroesophageal reflux disease)     Hep C w/o coma, chronic ([address]     History of alcohol abuse     6-12 beers a day; quit drinking 2019    History of blood transfusion     History of colon polyps 2016    History of tobacco abuse     [place] (hard of hearing)     Hyperlipidemia     Hypertension     Hyponatremia 07/20/2016    Port-A-Cath in place     right upper chest    Portal hypertension ([address]     Sciatica     Secondary esophageal varices ([address] 06/07/2022    Shortness of breath     Spinal stenosis     Stomach ulcer     hx of    Tubular adenoma of colon 2016, 2018    Vitamin D deficiency     Wears glasses        H&P Status: H&P was reviewed, the patient was examined and no change has occurred inpatient's condition since H&P was completed. No Known Allergies    Active Problems:    * No active hospital problems. *  Resolved Problems:    * No resolved hospital problems.  *      /70   Pulse 88   Resp 20   SpO2 100% Comment: room air    Review of Systems    Physical Exam    Mallampati Score 2  ASA class 2    Assessment:  Male with cirrhosis and history of chronic ETOH abuse with multiple episodes of variceal bleeding and banding with sclerosis. Numerous admissions with transfusion. Pt also needs paracentesis on weekly basis due to recurrent ascites. Ptb high risk for variceal bleed. Pt still drinking but at a reduced amount. MELD score is 10 from older labs. Plan:   Elective TIPS procedure for ascites control and decrease risk of major variceal bleed. Risk of procedure including severe bleeding, encephalopathy, infection, CHF and infection was discussed. Pt would like to proceed with TIPS procedure. Will have new labs drawn. Will need to be admitted overnight.      Electronically signed by Hina Tariq MD on 11/15/2022 at 4:00 PM

## 2022-11-17 ENCOUNTER — TELEPHONE (OUTPATIENT)
Dept: INTERVENTIONAL RADIOLOGY/VASCULAR | Age: 62
End: 2022-11-17

## 2022-11-17 DIAGNOSIS — R18.8 OTHER ASCITES: ICD-10-CM

## 2022-11-17 DIAGNOSIS — K76.6 PORTAL HYPERTENSION (HCC): Primary | ICD-10-CM

## 2022-11-17 DIAGNOSIS — K74.60 HEPATIC CIRRHOSIS, UNSPECIFIED HEPATIC CIRRHOSIS TYPE, UNSPECIFIED WHETHER ASCITES PRESENT (HCC): ICD-10-CM

## 2022-11-17 NOTE — TELEPHONE ENCOUNTER
The patient is scheduled for a TIPS procedure with Dr Ale Parks on 12/1 @ [facility]. Could you please place an outpatient order for the procedure [id number]. The order in the chart is an inpatient order.

## 2022-11-18 ENCOUNTER — HOSPITAL ENCOUNTER (OUTPATIENT)
Dept: INTERVENTIONAL RADIOLOGY/VASCULAR | Age: 62
Discharge: HOME | End: 2022-11-20
Payer: MEDICARE

## 2022-11-18 VITALS
DIASTOLIC BLOOD PRESSURE: 68 MMHG | SYSTOLIC BLOOD PRESSURE: 105 MMHG | HEART RATE: 84 BPM | TEMPERATURE: 98.4 F | OXYGEN SATURATION: 99 % | RESPIRATION RATE: 16 BRPM

## 2022-11-18 DIAGNOSIS — K70.31 ALCOHOLIC CIRRHOSIS OF LIVER WITH ASCITES (HCC): ICD-10-CM

## 2022-11-18 PROCEDURE — 2709999900 IR US GUIDED PARACENTESIS

## 2022-11-18 PROCEDURE — P9047 ALBUMIN (HUMAN), 25%, 50ML: HCPCS | Performed by: RADIOLOGY

## 2022-11-18 PROCEDURE — 7100000010 HC PHASE II RECOVERY - FIRST 15 MIN

## 2022-11-18 PROCEDURE — 49083 ABD PARACENTESIS W/IMAGING: CPT

## 2022-11-18 PROCEDURE — 6360000002 HC RX W HCPCS: Performed by: RADIOLOGY

## 2022-11-18 PROCEDURE — 2580000003 HC RX 258: Performed by: RADIOLOGY

## 2022-11-18 PROCEDURE — 7100000011 HC PHASE II RECOVERY - ADDTL 15 MIN

## 2022-11-18 RX ORDER — ALBUMIN (HUMAN) 12.5 G/50ML
25 SOLUTION INTRAVENOUS ONCE
Status: COMPLETED | OUTPATIENT
Start: 2022-11-18 | End: 2022-11-18

## 2022-11-18 RX ORDER — SODIUM CHLORIDE 9 MG/ML
INJECTION, SOLUTION INTRAVENOUS PRN
Status: DISCONTINUED | OUTPATIENT
Start: 2022-11-18 | End: 2022-11-21 | Stop reason: HOSPADM

## 2022-11-18 RX ORDER — SODIUM CHLORIDE 9 MG/ML
INJECTION, SOLUTION INTRAVENOUS PRN
Status: CANCELLED | OUTPATIENT
Start: 2022-11-18

## 2022-11-18 RX ORDER — SODIUM CHLORIDE 0.9 % (FLUSH) 0.9 %
5-40 SYRINGE (ML) INJECTION EVERY 12 HOURS SCHEDULED
Status: DISCONTINUED | OUTPATIENT
Start: 2022-11-18 | End: 2022-11-21 | Stop reason: HOSPADM

## 2022-11-18 RX ORDER — SODIUM CHLORIDE 0.9 % (FLUSH) 0.9 %
5-40 SYRINGE (ML) INJECTION EVERY 12 HOURS SCHEDULED
Status: CANCELLED | OUTPATIENT
Start: 2022-11-18

## 2022-11-18 RX ORDER — SODIUM CHLORIDE 0.9 % (FLUSH) 0.9 %
5-40 SYRINGE (ML) INJECTION PRN
Status: DISCONTINUED | OUTPATIENT
Start: 2022-11-18 | End: 2022-11-21 | Stop reason: HOSPADM

## 2022-11-18 RX ORDER — SODIUM CHLORIDE 0.9 % (FLUSH) 0.9 %
5-40 SYRINGE (ML) INJECTION PRN
Status: CANCELLED | OUTPATIENT
Start: 2022-11-18

## 2022-11-18 RX ORDER — HEPARIN SODIUM (PORCINE) LOCK FLUSH IV SOLN 100 UNIT/ML 100 UNIT/ML
100 SOLUTION INTRAVENOUS PRN
Status: DISCONTINUED | OUTPATIENT
Start: 2022-11-18 | End: 2022-11-21 | Stop reason: HOSPADM

## 2022-11-18 RX ADMIN — SODIUM CHLORIDE: 9 INJECTION, SOLUTION INTRAVENOUS at 12:26

## 2022-11-18 RX ADMIN — ALBUMIN (HUMAN) 25 G: 0.25 INJECTION, SOLUTION INTRAVENOUS at 13:40

## 2022-11-18 RX ADMIN — ALBUMIN (HUMAN) 25 G: 0.25 INJECTION, SOLUTION INTRAVENOUS at 14:10

## 2022-11-18 RX ADMIN — HEPARIN 100 UNITS: 100 SYRINGE at 15:01

## 2022-11-18 RX ADMIN — ALBUMIN (HUMAN) 25 G: 0.25 INJECTION, SOLUTION INTRAVENOUS at 13:54

## 2022-11-18 ASSESSMENT — PAIN - FUNCTIONAL ASSESSMENT: PAIN_FUNCTIONAL_ASSESSMENT: NONE - DENIES PAIN

## 2022-11-18 NOTE — PROGRESS NOTES
Patient tolerated procedure well without distress. 9.9 L of cloudy, yellow fluid removed. Area cleansed & dry dressing applied. Transport notified to take patient back to [facility]. JERI Mcclure updated.

## 2022-11-18 NOTE — BRIEF OP NOTE
Brief Postoperative Note    Baljeet Kumar  YOB: 1960  [id number]    Pre-operative Diagnosis: Ascites    Post-operative Diagnosis: Same    Procedure: Paracentesis    Anesthesia: Local    Surgeons/Assistants: Yuriy Woods    Estimated Blood Loss: less than 50     Complications: None    Specimens: Was Not Obtained    Findings: U/s guided right paracentesis yielding 9.9 L cloudy yellow fluid.      Electronically signed by Katherin Rojas MD on 11/18/2022 at 1:43 PM

## 2022-11-18 NOTE — DISCHARGE INSTRUCTIONS
DISCHARGE INSTRUCTIONS FOR PARACENTESIS    In order to continue your care at home, please follow the instructions below. Medications    Use over-the-counter pain medication as directed on bottle for pain control    Post Procedure Site/Care/Activity  For up to 2 days after the procedure, you may have a small amount of clear fluid coming out of the site where the needle was inserted, especially if you had a lot of fluid removed. You may need to change the bandage on the site. Do not lie totally flat until morning. You can do your normal activities after the procedure, unless instructed differently. Call your doctor or seek immediate medical care if:   You have symptoms of infection, such as increased pain, swelling, warmth, or redness, red streaks or pus. An oral temperature (by mouth) is 101 degrees or higher (fever), chills, fever. You are dizzy or lightheaded, or you feel like you may faint. You have new or worse belly pain. Watch closely for changes in your health, and be sure to contact your doctor if:   Fluid builds up in your belly again. You do not get better as expected. IF YOU CANNOT REACH THE DOCTOR, GO TO THE NEAREST EMERGENCY ROOM OR CALL 911    Phone: Interventional Radiology   [phone]  After hours Radiology   [phone]   Dr Nighat Medrano performed your procedure today. 9.9L were removed.

## 2022-11-18 NOTE — H&P
HISTORY and Treinta ALAYNA [facility] [address]       NAME:  Jian Merritt  MRN: [id number]   YOB: 1960   Date: 11/18/2022   Age: 60 y.o. Gender: male     H&P Update Note    H&P from 10/28/2022 reviewed and updated. Patient examined. INTERVAL HISTORY:     Jian Merritt is 60 y.o.,  male, here today for paracentesis. Patient has history of liver cirrhosis with ascites. pt has history of chronic ETOH abuse. Patient had last paracentesis on 11/11/22 with  A total of 11.2 L of slightly turbid yellow colored fluid was removed. Supplemental albumin was given intravenously. Patient denies any abdominal pain. He said \"it is the same every week no changes \". And he answer all the question with No.  He denies any abdominal  pressure , no shortness of breath , no easy bleeding/bruising, no  swelling in the lower legs bilaterally. No other concerns today, no recent fever/chills, no chest pain, no shortness of breath currently. No interval changes to past medical history, social history, family history. Review of systems as stated above and otherwise negative. GENERAL PHYSICAL EXAM:     Vitals: /76   Pulse 80   Temp 97.7 °F (36.5 °C) (Temporal)   Resp 16   SpO2 98%  There is no height or weight on file to calculate BMI. GENERAL APPEARANCE:  Patient is alert and oriented. Does not appear to be distress or pain at this time. SKIN:  Normal temperature, turgor and texture. No cyanosis or jaundice. HEAD:  Normocephalic, atraumatic. EYES:  Pupils equal, reactive to light and accomodation. Conjunctiva clear. EOMs intact bilaterally. THROAT:  Mucous membranes moist. No tonsillar erythema or exudates. NECK:  No stiffness, trachea central.  No palpable masses. HEART:  Regular rate and rhythm. No murmurs. LUNGS:  Equal on expansion. Clear to auscultation bilaterally with no adventitious sounds. ABDOMEN:  Soft on palpation. No localized tenderness, guarding or rigidity. No palpable organomegaly. There is distension (Ascites )      LYMPHATICS:  No cervical lymphadenopathy. EXTREMITIES:  Symmetrical with no pretibial/pedal edema. No discoloration or ulcerations. No warmth, tenderness, erythema noted in lower legs bilaterally. NEUROLOGIC:  The patient is conscious, alert, oriented. No apparent focal sensory deficits. No motor deficits, muscle strength equal bilaterally. No facial droop, tongue protrudes centrally, no slurring of the speech. Cranial nerve exam reveals no deficits. No interval changes. I concur with the findings. Reese Bzaan, APRN - CNP on 11/18/2022 at 12:48 PM    HISTORY and Treinta ALAYNA [facility] [address]         NAME:  Mary Colon  MRN: [id number]   YOB: 1960   Date: 10/28/2022   Age: 60 y.o. Gender: male         COMPLAINT AND PRESENT HISTORY:      Mary Colon is 60 y.o.,  male, presents for paracentesis treatment for alcoholic cirrhosis      Primary dx:   HPI:  Mary Colon is 60 y.o.,  male, here today for paracentesis. Patient is receiving treatments paracentesis treatment for alcoholic cirrhosis  Patient has history of liver cirrhosis with ascites. Patient last had last paracentesis on 10/21/22 with  A total of 7.9 L of clear yellow fluid was removed. Patient admits to diffuse abdominal pain described as a pressure (8-9/10), with radiation ot his chest   Patient admits to associated shortness of breath, improved by sitting in chair, worse when lying flat. Patient is  easy bleeding/bruising. Patient  has no swelling in the lower legs bilaterally. No other concerns today, no recent fever/chills, no chest pain, no shortness of breath currently.      Review of additional SIGNIFICANT medical hx (see chart for additional detail, including current medications / see ROS for current s/s):      ESOPHAGEAL CA, HEP. C, HX OF ETOH ABUSE,  PORTAL HTN, ESOPHAGEAL VARICES, CIRRHOSIS, HTN, HLD, JIMENES ESOPHAGUS, COVID-19, SOB. EKG 9/12/22:  Normal sinus rhythm  Low voltage QRS  Nonspecific ST and T wave abnormality  Abnormal ECG  When compared with ECG of 05-JUL-2022 14:27,  Previous ECG has undetermined rhythm, needs review     ECHO 12/20/21:  Summary  Left ventricle is normal in size. Mild left ventricular hypertrophy. Global left ventricular systolic function is normal. Estimated LV EF 60-65  %. Left atrium is mildly dilated. No significant valvular regurgitation or stenosis seen. No significant pericardial effusion is seen. Normal aortic root dimension. NPO status: pt NPO since the past midnight   Medications taken TODAY (with sip of water): pt took all his am medication with sip of water   Anticoagulation status: none  Denies personal hx of blood clots. Denies personal hx of MRSA infection. Denies any personal or family hx of previous complications w/anesthesia.      PAST MEDICAL HISTORY      Past Medical History           Past Medical History:   Diagnosis Date    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 ([address]      Alcoholic cirrhosis of liver with ascites ([address]      Anemia 04/13/2022    Anxiety      Arthritis      Back pain, chronic       dr. Scarlet Menchaca, orthopedic, every 3-4 months, gets steroid injection    Jimenes esophagus      BPH (benign prostatic hypertrophy)      Cholelithiasis      Cirrhosis ([address]      COVID-19 12/2020     pt reports he had a positive test while at [facility] in 2020, was asymptomatic    COVID-19 vaccine series completed 5/20/2021, 6/22/2021     Moderna 5/20/2021, 6/22/2021    DDD (degenerative disc disease), lumbar      Depression      Esophageal cancer ([address]       INVASIVE ADENOCARCINOMA ARISING IN TUBULAR ADENOMA WITH HIGH GRADE DYSPLASIA, ASSOCIATED WITH FOCAL INTESTINAL METAPLASIA     Esophageal varices ([address]      Fatty liver      GERD (gastroesophageal reflux disease)      Hep C w/o coma, chronic ([address] History of alcohol abuse       6-12 beers a day; quit drinking 2019    History of blood transfusion      History of colon polyps 2016    History of tobacco abuse      [place] (hard of hearing)      Hyperlipidemia      Hypertension      Hyponatremia 07/20/2016    Port-A-Cath in place       right upper chest    Portal hypertension ([facility]      Sciatica      Secondary esophageal varices ([facility] 06/07/2022    Shortness of breath      Spinal stenosis      Stomach ulcer       hx of    Tubular adenoma of colon 2016, 2018    Vitamin D deficiency      Wears glasses              SURGICAL HISTORY        Past Surgical History             Past Surgical History:   Procedure Laterality Date    BUNIONECTOMY         twice on right side    BUNIONECTOMY Left      CARPAL TUNNEL RELEASE Right      COLONOSCOPY         at age 35    COLONOSCOPY   10/05/2016     polyps-pathology tubular adenoma, and abnormal looking mucosa right colon-pathology-tubular adenoma    COLONOSCOPY N/A 03/30/2018     COLONOSCOPY POLYPECTOMY COLD BIOPSY performed by Sony Rose MD at [address] [address],[facility] [address]   03/30/2018     Small polyp in the sigmoid colon and excised with biopsy forceps--tubular adenoma    COLONOSCOPY N/A 04/16/2022     COLONOSCOPY POLYPECTOMY performed by Sony Rose MD at [facility], DIAGNOSTIC         EGD    IR PORT PLACEMENT EQUAL OR GREATER THAN 5 YEARS   04/19/2021     IR PORT PLACEMENT EQUAL OR GREATER THAN 5 YEARS 4/19/2021 [facility]    KNEE SURGERY Left       cyst removed    NASAL SEPTUM SURGERY        NERVE BLOCK Right 11/23/2020     NERVE BLOCK RIGHT CERVICAL STEROID INJECTION  C3-C6 performed by Sebas Bell MD at [facility]   01/04/2016     steroid injection C7 T1    OTHER SURGICAL HISTORY   11/21/2016     Bilateral Lumbar CACHORRO L4-L5 injections    OTHER SURGICAL HISTORY   12/19/2016     lumbar steroid injection    OTHER SURGICAL HISTORY   09/28/2018     BILATERAL L5 CACHORRO (N/A Back)    OTHER SURGICAL HISTORY Right 11/23/2020     cervical injection    PAIN MANAGEMENT PROCEDURE Left 07/09/2020     EPIDURAL STEROID INJECTION LEFT L4 L5 performed by Bandar Andrew MD at [facility] Left 07/20/2020     LEFT L4 L5 EPIDURAL STEROID INJECTION performed by Bandar Andrew MD at [facility] Bilateral 08/17/2020     LUMBAR FACET BILATERAL L2-L5 performed by Bandar Andrew MD at [facility] Bilateral 12/07/2020     NERVE BLOCK BILATERAL LUMBAR MEDIAL BRANCH L2-L5 performed by Bandar Andrew MD at [address] [place] [address] Sunil Shavon [address] AA&/STRD TFRML EPI LUMBAR/SACRAL 1 LEVEL Bilateral 09/06/2018     BILATERAL L5 CACHORRO performed by Bandar Andrew MD at [facility] AA&/STRD TFRML EPI LUMBAR/SACRAL 1 LEVEL N/A 09/28/2018     BILATERAL L5 CACHORRO performed by Bandar Andrew MD at [address] [place] [address] N/CARPAL TUNNEL SURG Right 08/29/2017     CARPAL TUNNEL RELEASE RIGHT performed by Katie Sadler MD at [address] [place] [address] N/CARPAL TUNNEL SURG Left 10/31/2017     CARPAL TUNNEL RELEASE performed by Katie Sadler MD at [address] [place] [address] 12/29/2020     EGD BIOPSY performed by Angelo Painting MD at [address] [place] [address] 02/02/2021     EGD BIOPSY and spot marking performed by Steve Gruber MD at [address] [place] [address] N/A 02/12/2021     ENDOSCOPIC ULTRASOUND, EGD performed by Ellie Arrieta MD at [facility] 1   02/12/2021     EGD DIAGNOSTIC ONLY performed by Ellie Arrieta MD at [facility] 1 08/31/2021     EGD BIOPSY performed by Steve Gruber MD at [facility] [place] 01/21/2022     EGD BIOPSY performed by Steve Gruber MD at [address] [place] [address] N/A 04/15/2022 EGD ESOPHAGOGASTRODUODENOSCOPY performed by Agusto Mckinley MD at [address] [place] [address] N/A 2022     EGD BAND LIGATION performed by Maria A Valle MD at [address] [place] [address] N/A 2022     EGD ESOPHAGOGASTRODUODENOSCOPY performed by Maria A Valle MD at [address] [place] [address] N/A 2022     EGD ESOPHAGOGASTRODUODENOSCOPY ENDOSCOPIC [facility] AT [facility] performed by Arnaldo Gowers, MD at [address] [place] [address] N/A 10/19/2022     EGD BIOPSY performed by Santa Mckeon MD at [facility]        Family History             Family History   Problem Relation Age of Onset    Cancer Mother           pancreatic    Cancer Father           bone    Diabetes Sister      Asthma Brother              SOCIAL HISTORY        Social History   Social History                Socioeconomic History    Marital status: Single       Spouse name: None    Number of children: None    Years of education: None    Highest education level: None   Tobacco Use    Smoking status: Former       Packs/day: 1.00       Years: 45.00       Pack years: 45.00       Types: Cigarettes       Quit date: 2017       Years since quittin.7    Smokeless tobacco: Never   Vaping Use    Vaping Use: Never used   Substance and Sexual Activity    Alcohol use: Not Currently       Comment: Quit alcohol in 2019- heavier drinking prior to quitting    Drug use: Not Currently       Frequency: 1.0 times per week       Types: Cocaine       Comment: Cocaine- stopped spring 2016    Sexual activity: Yes       Partners: Female   Social History Narrative      in the past, retired      Social Determinants of Health            Financial Resource Strain: Low Risk     Difficulty of Paying Living Expenses: Not hard at all   Food Insecurity: No Food Insecurity    Worried About [facility] in the Last Year: Never true    Ran Out of Food in the Last Year: Never true   Physical Activity: Inactive    Days of Exercise per Week: 0 days    Minutes of Exercise per Session: 0 min               REVIEW OF SYSTEMS       No Known Allergies                 Current Outpatient Medications on File Prior to Encounter   Medication Sig Dispense Refill    FEROSUL 325 (65 Fe) MG tablet take 1 tablet by mouth twice a day 60 tablet 5    nadolol (CORGARD) 20 MG tablet take 1 tablet by mouth once daily 30 tablet 2    midodrine (PROAMATINE) 5 MG tablet Take 2 tablets by mouth in the morning and 2 tablets at noon and 2 tablets before bedtime. 90 tablet 3    furosemide (LASIX) 20 MG tablet Take 1 tablet by mouth 2 times daily 60 tablet 3    spironolactone (ALDACTONE) 100 MG tablet Take 1 tablet by mouth every evening (Patient not taking: Reported on 10/21/2022) 30 tablet 1    ondansetron (ZOFRAN-ODT) 4 MG disintegrating tablet dissolve 1 tablet by mouth every 8 hours if needed for nausea and vomiting 10 tablet 0    lactulose (CHRONULAC) 10 GM/15ML solution take 30 milliliters by mouth three times a day 473 mL 1    FLUoxetine (PROZAC) 20 MG capsule Take 1 capsule by mouth daily 30 capsule 3    atorvastatin (LIPITOR) 20 MG tablet Take 1 tablet by mouth nightly 30 tablet 3      No current facility-administered medications on file prior to encounter. Review of Systems   Constitutional: Negative. HENT: Negative. Eyes:  Positive for visual disturbance. Eye glasses    Respiratory:  Positive for shortness of breath. Cardiovascular: Negative. Gastrointestinal:  Positive for abdominal distention. Genitourinary: Negative. Musculoskeletal: Negative. Hematological:  Bruises/bleeds easily. Bilateral arms    Psychiatric/Behavioral: Negative. GENERAL PHYSICAL EXAM      Vitals: /61   Pulse 71   Temp 97.1 °F (36.2 °C) (Infrared)   Resp 12   SpO2 97%  There is no height or weight on file to calculate BMI. GENERAL APPEARANCE:   Ata Harry is 60 y.o.,  male, not obese, nourished, conscious, alert. Does not appear to be distress or pain at this time. Physical Exam  Constitutional:       Appearance: Normal appearance. He is normal weight. HENT:      Head: Normocephalic. Right Ear: External ear normal.      Left Ear: External ear normal.      Nose: Nose normal.      Mouth/Throat:      Mouth: Mucous membranes are moist.      Comments: Multiple dental caries upper and lower   Eyes:      General:         Right eye: No discharge. Left eye: No discharge. Cardiovascular:      Rate and Rhythm: Normal rate and regular rhythm. Pulses: Normal pulses. Radial pulses are 2+ on the right side and 2+ on the left side. Dorsalis pedis pulses are 2+ on the right side and 2+ on the left side. Posterior tibial pulses are 2+ on the right side and 2+ on the left side. Heart sounds: Normal heart sounds. Pulmonary:      Effort: Pulmonary effort is normal.      Breath sounds: Normal breath sounds. No wheezing or rales. Abdominal:      General: Bowel sounds are normal. There is distension. Palpations: Abdomen is soft. There is no mass. Tenderness: There is no abdominal tenderness. Comments: Ascites    Musculoskeletal:         General: No swelling or tenderness. Cervical back: Normal range of motion and neck supple. Right lower leg: No edema. Left lower leg: No edema. Skin:     General: Skin is warm and dry. Findings: Bruising present. Comments: Bilateral arms    Neurological:      General: No focal deficit present. Mental Status: He is alert and oriented to person, place, and time. Motor: No weakness.       Gait: Gait normal.   Psychiatric:         Mood and Affect: Mood normal.         Behavior: Behavior normal.                 PROVISIONAL DIAGNOSES / SURGERY:       Paracentesis      Alcoholic cirrhosis              Patient Active Problem List     Diagnosis Date Noted    GI bleed 09/13/2022    Secondary esophageal varices ([address] 06/07/2022    Esophageal polyp 06/07/2022    Drop in hemoglobin 06/03/2022    Portal hypertension (HCC)      Shortness of breath      Ascites 04/26/2022    Acute kidney failure, unspecified ([address] 04/21/2022    Muscle weakness (generalized) 07/28/2016    Other abnormalities of gait and mobility 07/28/2016    Anemia 04/13/2022    Acute kidney injury ([address] 04/13/2022    Esophageal adenocarcinoma (HCC)      Low hemoglobin 12/20/2021    Symptomatic anemia, microcytic, acute 12/20/2021    Hypotension 12/20/2021    Former smoker, 50+ pack years, quit 2016 12/20/2021    HLD (hyperlipidemia) 12/20/2021    Abnormal findings on diagnostic imaging of spine 12/14/2021    Cervical spinal stenosis 12/14/2021    Spinal stenosis of lumbar region with neurogenic claudication 12/14/2021    Severe comorbid illness 11/30/2021    Gait instability 11/30/2021    Current smoker 04/05/2021    COVID-19 02/23/2021    Anxiety 02/23/2021    Malignant neoplasm of lower third of esophagus ([address]      Hypocalcemia 12/26/2020    Hypophosphatemia 12/26/2020    Gastrointestinal hemorrhage with melena      Alcohol abuse      Altered mental status      Acute gastrointestinal bleeding 12/23/2020    Thrombocytopenia ([address] 12/23/2020    Hepatitis C virus infection resolved after antiviral drug therapy 12/23/2020    Cervical facet syndrome 11/23/2020    Lumbar facet arthropathy 08/17/2020    Elevated LFTs 08/12/2020    Seasonal allergies 08/12/2020    S/P epidural steroid injection 08/05/2020    Essential hypertension 04/24/2019    Recurrent major depressive disorder in partial remission ([address] 04/24/2019    Pure hypercholesterolemia 02/04/2019    Hypokalemia 02/04/2019    Vitamin D deficiency 09/20/2017    History of hepatitis C 09/11/2017    Ganglion cyst 05/31/2017    Carpal tunnel syndrome of right wrist 05/31/2017 Tinnitus 03/23/2017    Eustachian tube dysfunction 03/23/2017    Lumbar radiculitis 11/08/2016    Lumbar disc herniation 11/08/2016    Gynecomastia, male 10/26/2016    Depression 10/13/2016    Vertebrogenic low back pain 10/06/2016    DDD (degenerative disc disease), lumbar 10/06/2016    Hepatic cirrhosis ([address] 09/15/2016    Psychophysiologic insomnia 09/14/2016    Cirrhosis ([facility]      Hep C w/o coma, chronic (HCC)      Fatty liver      Calculus of gallbladder without cholecystitis 08/10/2016    Chronic viral hepatitis B without delta agent and without coma ([address] 07/22/2016    Hypomagnesemia      Alcohol withdrawal syndrome without complication ([address] 46/50/6095    Cervical radicular pain 01/04/2016    Tubular adenoma of colon 01/01/2016    History of colon polyps 01/01/2016    Back pain, chronic 04/19/2012    Hearing difficulty 04/19/2012    GERD (gastroesophageal reflux disease) 04/19/2012            MASSIMO Hadley - CNP on 10/28/2022 at 1:02 PM

## 2022-11-21 ENCOUNTER — HOSPITAL ENCOUNTER (OUTPATIENT)
Age: 62
Discharge: HOME | End: 2022-11-21
Payer: MEDICARE

## 2022-11-21 DIAGNOSIS — K70.31 ASCITES DUE TO ALCOHOLIC CIRRHOSIS (HCC): ICD-10-CM

## 2022-11-21 LAB
ABSOLUTE BANDS #: 0.06 K/UL (ref 0–1)
ABSOLUTE EOS #: 0.06 K/UL (ref 0–0.4)
ABSOLUTE LYMPH #: 0.5 K/UL (ref 1–4.8)
ABSOLUTE MONO #: 0.93 K/UL (ref 0.1–1.3)
ALBUMIN SERPL-MCNC: 3.4 G/DL (ref 3.5–5.2)
ALP BLD-CCNC: 122 U/L (ref 40–129)
ALT SERPL-CCNC: 11 U/L (ref 5–41)
ANION GAP SERPL CALCULATED.3IONS-SCNC: 9 MMOL/L (ref 9–17)
AST SERPL-CCNC: 31 U/L
BANDS: 1 % (ref 0–10)
BASOPHILS # BLD: 0 % (ref 0–2)
BASOPHILS ABSOLUTE: 0 K/UL (ref 0–0.2)
BILIRUB SERPL-MCNC: 1.1 MG/DL (ref 0.3–1.2)
BUN BLDV-MCNC: 7 MG/DL (ref 8–23)
CALCIUM SERPL-MCNC: 9 MG/DL (ref 8.6–10.4)
CHLORIDE BLD-SCNC: 98 MMOL/L (ref 98–107)
CO2: 24 MMOL/L (ref 20–31)
CREAT SERPL-MCNC: 0.61 MG/DL (ref 0.7–1.2)
EOSINOPHILS RELATIVE PERCENT: 1 % (ref 0–4)
GFR SERPL CREATININE-BSD FRML MDRD: >60 ML/MIN/1.73M2
GLUCOSE BLD-MCNC: 114 MG/DL (ref 70–99)
HCT VFR BLD CALC: 29.3 % (ref 41–53)
HEMOGLOBIN: 9 G/DL (ref 13.5–17.5)
LYMPHOCYTES # BLD: 8 % (ref 24–44)
MCH RBC QN AUTO: 25.5 PG (ref 26–34)
MCHC RBC AUTO-ENTMCNC: 30.7 G/DL (ref 31–37)
MCV RBC AUTO: 83.3 FL (ref 80–100)
MONOCYTES # BLD: 15 % (ref 1–7)
MORPHOLOGY: ABNORMAL
MORPHOLOGY: ABNORMAL
PDW BLD-RTO: 16.9 % (ref 11.5–14.9)
PLATELET # BLD: 216 K/UL (ref 150–450)
PMV BLD AUTO: 6.7 FL (ref 6–12)
POTASSIUM SERPL-SCNC: 4.3 MMOL/L (ref 3.7–5.3)
RBC # BLD: 3.52 M/UL (ref 4.5–5.9)
SEG NEUTROPHILS: 75 % (ref 36–66)
SEGMENTED NEUTROPHILS ABSOLUTE COUNT: 4.65 K/UL (ref 1.3–9.1)
SODIUM BLD-SCNC: 131 MMOL/L (ref 135–144)
TOTAL PROTEIN: 5.9 G/DL (ref 6.4–8.3)
WBC # BLD: 6.2 K/UL (ref 3.5–11)

## 2022-11-21 PROCEDURE — 36415 COLL VENOUS BLD VENIPUNCTURE: CPT

## 2022-11-21 PROCEDURE — 80053 COMPREHEN METABOLIC PANEL: CPT

## 2022-11-21 PROCEDURE — 85025 COMPLETE CBC W/AUTO DIFF WBC: CPT

## 2022-11-25 ENCOUNTER — HOSPITAL ENCOUNTER (OUTPATIENT)
Dept: INTERVENTIONAL RADIOLOGY/VASCULAR | Age: 62
Discharge: HOME | End: 2022-11-27
Payer: MEDICARE

## 2022-11-25 VITALS — DIASTOLIC BLOOD PRESSURE: 65 MMHG | SYSTOLIC BLOOD PRESSURE: 111 MMHG

## 2022-11-25 DIAGNOSIS — K70.31 ALCOHOLIC CIRRHOSIS OF LIVER WITH ASCITES (HCC): ICD-10-CM

## 2022-11-25 PROCEDURE — P9047 ALBUMIN (HUMAN), 25%, 50ML: HCPCS | Performed by: RADIOLOGY

## 2022-11-25 PROCEDURE — 49083 ABD PARACENTESIS W/IMAGING: CPT

## 2022-11-25 PROCEDURE — 2709999900 HC NON-CHARGEABLE SUPPLY

## 2022-11-25 PROCEDURE — 6360000002 HC RX W HCPCS: Performed by: RADIOLOGY

## 2022-11-25 RX ORDER — HEPARIN SODIUM (PORCINE) LOCK FLUSH IV SOLN 100 UNIT/ML 100 UNIT/ML
500 SOLUTION INTRAVENOUS ONCE
Status: COMPLETED | OUTPATIENT
Start: 2022-11-25 | End: 2022-11-25

## 2022-11-25 RX ORDER — ALBUMIN (HUMAN) 12.5 G/50ML
50 SOLUTION INTRAVENOUS ONCE
Status: COMPLETED | OUTPATIENT
Start: 2022-11-25 | End: 2022-11-25

## 2022-11-25 RX ADMIN — Medication 500 UNITS: at 10:38

## 2022-11-25 RX ADMIN — ALBUMIN (HUMAN) 50 G: 0.25 INJECTION, SOLUTION INTRAVENOUS at 10:36

## 2022-11-25 NOTE — BRIEF OP NOTE
Brief Postoperative Note    Chasidy Casey  YOB: 1960  [id number]    Pre-operative Diagnosis: Recurrent ascites; abdominal discomfort    Post-operative Diagnosis: Same    Procedure: US guided paracentesis     Anesthesia: Local    Surgeons/Assistants: Gregg    Estimated Blood Loss: less than 50     Complications: None    Specimens: Was Obtained: non turbid fluid     Electronically signed by Junie Jensen MD on 11/25/2022 at 6:28 PM

## 2022-11-25 NOTE — OR NURSING
PT HAD OP PARACENTESIS TODAY. 7.3 LITERS OF YELLOW ASCITIC FLUID COLLECTED. 2X2 GAUZE AND TEGADERM TO CATHETER SITE. DRY AND INTACT. PT TOLERATED WELL. BP STABLE THROUGHOUT PROCEDURE. PORT A CATH ACCESSED FOR ALBUMIN INFUSION PER POLICY.

## 2022-11-26 PROCEDURE — 2709999900 HC NON-CHARGEABLE SUPPLY

## 2022-11-29 ENCOUNTER — HOSPITAL ENCOUNTER (OUTPATIENT)
Age: 62
Discharge: HOME | End: 2022-11-29
Payer: MEDICARE

## 2022-11-29 DIAGNOSIS — C15.9 ESOPHAGEAL ADENOCARCINOMA (HCC): ICD-10-CM

## 2022-11-29 DIAGNOSIS — K70.31 ASCITES DUE TO ALCOHOLIC CIRRHOSIS (HCC): ICD-10-CM

## 2022-11-29 DIAGNOSIS — R97.8 OTHER ABNORMAL TUMOR MARKERS: ICD-10-CM

## 2022-11-29 LAB
ABSOLUTE EOS #: 0 K/UL (ref 0–0.4)
ABSOLUTE LYMPH #: 0.5 K/UL (ref 1–4.8)
ABSOLUTE MONO #: 0.8 K/UL (ref 0.1–1.3)
ALBUMIN SERPL-MCNC: 3.3 G/DL (ref 3.5–5.2)
ALBUMIN SERPL-MCNC: 3.3 G/DL (ref 3.5–5.2)
ALP BLD-CCNC: 127 U/L (ref 40–129)
ALP BLD-CCNC: 127 U/L (ref 40–129)
ALT SERPL-CCNC: 13 U/L (ref 5–41)
ALT SERPL-CCNC: 13 U/L (ref 5–41)
ANION GAP SERPL CALCULATED.3IONS-SCNC: 10 MMOL/L (ref 9–17)
ANION GAP SERPL CALCULATED.3IONS-SCNC: 10 MMOL/L (ref 9–17)
AST SERPL-CCNC: 36 U/L
AST SERPL-CCNC: 36 U/L
BASOPHILS # BLD: 0 % (ref 0–2)
BASOPHILS ABSOLUTE: 0 K/UL (ref 0–0.2)
BILIRUB SERPL-MCNC: 1.2 MG/DL (ref 0.3–1.2)
BILIRUB SERPL-MCNC: 1.2 MG/DL (ref 0.3–1.2)
BUN BLDV-MCNC: 7 MG/DL (ref 8–23)
BUN BLDV-MCNC: 7 MG/DL (ref 8–23)
CA 19-9: 29 U/ML (ref 0–35)
CALCIUM SERPL-MCNC: 8.8 MG/DL (ref 8.6–10.4)
CALCIUM SERPL-MCNC: 8.8 MG/DL (ref 8.6–10.4)
CARCINOEMBRYONIC ANTIGEN: 4.9 NG/ML
CHLORIDE BLD-SCNC: 100 MMOL/L (ref 98–107)
CHLORIDE BLD-SCNC: 100 MMOL/L (ref 98–107)
CO2: 22 MMOL/L (ref 20–31)
CO2: 22 MMOL/L (ref 20–31)
CREAT SERPL-MCNC: 0.7 MG/DL (ref 0.7–1.2)
CREAT SERPL-MCNC: 0.7 MG/DL (ref 0.7–1.2)
EOSINOPHILS RELATIVE PERCENT: 1 % (ref 0–4)
GFR SERPL CREATININE-BSD FRML MDRD: >60 ML/MIN/1.73M2
GFR SERPL CREATININE-BSD FRML MDRD: >60 ML/MIN/1.73M2
GLUCOSE BLD-MCNC: 126 MG/DL (ref 70–99)
GLUCOSE BLD-MCNC: 126 MG/DL (ref 70–99)
HCT VFR BLD CALC: 28.9 % (ref 41–53)
HEMOGLOBIN: 9 G/DL (ref 13.5–17.5)
LYMPHOCYTES # BLD: 8 % (ref 24–44)
MCH RBC QN AUTO: 25.5 PG (ref 26–34)
MCHC RBC AUTO-ENTMCNC: 31.1 G/DL (ref 31–37)
MCV RBC AUTO: 82.1 FL (ref 80–100)
MONOCYTES # BLD: 13 % (ref 1–7)
PDW BLD-RTO: 17.3 % (ref 11.5–14.9)
PLATELET # BLD: 167 K/UL (ref 150–450)
PMV BLD AUTO: 7 FL (ref 6–12)
POTASSIUM SERPL-SCNC: 4.2 MMOL/L (ref 3.7–5.3)
POTASSIUM SERPL-SCNC: 4.2 MMOL/L (ref 3.7–5.3)
RBC # BLD: 3.53 M/UL (ref 4.5–5.9)
SEG NEUTROPHILS: 78 % (ref 36–66)
SEGMENTED NEUTROPHILS ABSOLUTE COUNT: 4.6 K/UL (ref 1.3–9.1)
SODIUM BLD-SCNC: 132 MMOL/L (ref 135–144)
SODIUM BLD-SCNC: 132 MMOL/L (ref 135–144)
TOTAL PROTEIN: 6.1 G/DL (ref 6.4–8.3)
TOTAL PROTEIN: 6.1 G/DL (ref 6.4–8.3)
WBC # BLD: 5.9 K/UL (ref 3.5–11)

## 2022-11-29 PROCEDURE — 80053 COMPREHEN METABOLIC PANEL: CPT

## 2022-11-29 PROCEDURE — 86301 IMMUNOASSAY TUMOR CA 19-9: CPT

## 2022-11-29 PROCEDURE — 82378 CARCINOEMBRYONIC ANTIGEN: CPT

## 2022-11-29 PROCEDURE — 85025 COMPLETE CBC W/AUTO DIFF WBC: CPT

## 2022-11-29 PROCEDURE — 36415 COLL VENOUS BLD VENIPUNCTURE: CPT

## 2022-12-01 ENCOUNTER — HOSPITAL ENCOUNTER (OUTPATIENT)
Dept: INTERVENTIONAL RADIOLOGY/VASCULAR | Age: 62
Discharge: HOME | End: 2022-12-03
Attending: INTERNAL MEDICINE | Admitting: INTERNAL MEDICINE
Payer: MEDICARE

## 2022-12-01 ENCOUNTER — ANESTHESIA EVENT (OUTPATIENT)
Dept: INTERVENTIONAL RADIOLOGY/VASCULAR | Age: 62
End: 2022-12-01

## 2022-12-01 ENCOUNTER — ANESTHESIA (OUTPATIENT)
Dept: INTERVENTIONAL RADIOLOGY/VASCULAR | Age: 62
End: 2022-12-01

## 2022-12-01 ENCOUNTER — HOSPITAL ENCOUNTER (OUTPATIENT)
Age: 62
Setting detail: OBSERVATION
Discharge: HOME HEALTH | End: 2022-12-02
Attending: INTERNAL MEDICINE | Admitting: INTERNAL MEDICINE
Payer: MEDICARE

## 2022-12-01 VITALS
DIASTOLIC BLOOD PRESSURE: 70 MMHG | WEIGHT: 190.48 LBS | RESPIRATION RATE: 17 BRPM | HEART RATE: 90 BPM | BODY MASS INDEX: 27.27 KG/M2 | HEIGHT: 70 IN | TEMPERATURE: 97 F | SYSTOLIC BLOOD PRESSURE: 110 MMHG | OXYGEN SATURATION: 94 %

## 2022-12-01 DIAGNOSIS — R18.8 OTHER ASCITES: ICD-10-CM

## 2022-12-01 DIAGNOSIS — K74.60 HEPATIC CIRRHOSIS, UNSPECIFIED HEPATIC CIRRHOSIS TYPE, UNSPECIFIED WHETHER ASCITES PRESENT (HCC): ICD-10-CM

## 2022-12-01 DIAGNOSIS — K76.6 PORTAL HYPERTENSION (HCC): ICD-10-CM

## 2022-12-01 PROBLEM — Z95.828 S/P TIPS (TRANSJUGULAR INTRAHEPATIC PORTOSYSTEMIC SHUNT): Status: ACTIVE | Noted: 2022-12-01

## 2022-12-01 LAB
ABSOLUTE EOS #: 0.06 K/UL (ref 0–0.44)
ABSOLUTE IMMATURE GRANULOCYTE: 0.06 K/UL (ref 0–0.3)
ABSOLUTE LYMPH #: 0.38 K/UL (ref 1.1–3.7)
ABSOLUTE MONO #: 0.96 K/UL (ref 0.1–1.2)
ALBUMIN SERPL-MCNC: 3.2 G/DL (ref 3.5–5.2)
ALBUMIN/GLOBULIN RATIO: 1.2 (ref 1–2.5)
ALP BLD-CCNC: 114 U/L (ref 40–129)
ALT SERPL-CCNC: 11 U/L (ref 5–41)
ANION GAP SERPL CALCULATED.3IONS-SCNC: 14 MMOL/L (ref 9–17)
AST SERPL-CCNC: 30 U/L
BASOPHILS # BLD: 0 % (ref 0–2)
BASOPHILS ABSOLUTE: 0 K/UL (ref 0–0.2)
BILIRUB SERPL-MCNC: 0.9 MG/DL (ref 0.3–1.2)
BUN BLDV-MCNC: 8 MG/DL (ref 8–23)
CALCIUM SERPL-MCNC: 8.4 MG/DL (ref 8.6–10.4)
CHLORIDE BLD-SCNC: 96 MMOL/L (ref 98–107)
CO2: 19 MMOL/L (ref 20–31)
CREAT SERPL-MCNC: 0.46 MG/DL (ref 0.7–1.2)
EOSINOPHILS RELATIVE PERCENT: 1 % (ref 1–4)
GFR SERPL CREATININE-BSD FRML MDRD: >60 ML/MIN/1.73M2
GLUCOSE BLD-MCNC: 100 MG/DL (ref 70–99)
HCT VFR BLD CALC: 28.1 % (ref 40.7–50.3)
HEMOGLOBIN: 8 G/DL (ref 13–17)
IMMATURE GRANULOCYTES: 1 %
INR BLD: 1.1
LYMPHOCYTES # BLD: 6 % (ref 24–43)
MCH RBC QN AUTO: 25 PG (ref 25.2–33.5)
MCHC RBC AUTO-ENTMCNC: 28.5 G/DL (ref 28.4–34.8)
MCV RBC AUTO: 87.8 FL (ref 82.6–102.9)
MONOCYTES # BLD: 15 % (ref 3–12)
MORPHOLOGY: ABNORMAL
NRBC AUTOMATED: 0 PER 100 WBC
PARTIAL THROMBOPLASTIN TIME: 26.9 SEC (ref 20.5–30.5)
PDW BLD-RTO: 15.8 % (ref 11.8–14.4)
PLATELET # BLD: 152 K/UL (ref 138–453)
PMV BLD AUTO: 9.8 FL (ref 8.1–13.5)
POC POTASSIUM: 4.2 MMOL/L (ref 3.5–4.5)
POTASSIUM SERPL-SCNC: 4.1 MMOL/L (ref 3.7–5.3)
PROTHROMBIN TIME: 11.6 SEC (ref 9.1–12.3)
RBC # BLD: 3.2 M/UL (ref 4.21–5.77)
SEG NEUTROPHILS: 77 % (ref 36–65)
SEGMENTED NEUTROPHILS ABSOLUTE COUNT: 4.94 K/UL (ref 1.5–8.1)
SODIUM BLD-SCNC: 129 MMOL/L (ref 135–144)
TOTAL PROTEIN: 5.9 G/DL (ref 6.4–8.3)
WBC # BLD: 6.4 K/UL (ref 3.5–11.3)

## 2022-12-01 PROCEDURE — 2709999900 HC NON-CHARGEABLE SUPPLY

## 2022-12-01 PROCEDURE — 2500000003 HC RX 250 WO HCPCS: Performed by: SPECIALIST

## 2022-12-01 PROCEDURE — 96374 THER/PROPH/DIAG INJ IV PUSH: CPT

## 2022-12-01 PROCEDURE — G0378 HOSPITAL OBSERVATION PER HR: HCPCS

## 2022-12-01 PROCEDURE — G0379 DIRECT REFER HOSPITAL OBSERV: HCPCS

## 2022-12-01 PROCEDURE — C1894 INTRO/SHEATH, NON-LASER: HCPCS

## 2022-12-01 PROCEDURE — 84132 ASSAY OF SERUM POTASSIUM: CPT

## 2022-12-01 PROCEDURE — 7100000000 HC PACU RECOVERY - FIRST 15 MIN

## 2022-12-01 PROCEDURE — 2580000003 HC RX 258: Performed by: NURSE ANESTHETIST, CERTIFIED REGISTERED

## 2022-12-01 PROCEDURE — P9046 ALBUMIN (HUMAN), 25%, 20 ML: HCPCS | Performed by: SPECIALIST

## 2022-12-01 PROCEDURE — 99219 PR INITIAL OBSERVATION CARE/DAY 50 MINUTES: CPT | Performed by: INTERNAL MEDICINE

## 2022-12-01 PROCEDURE — 6360000002 HC RX W HCPCS: Performed by: NURSE PRACTITIONER

## 2022-12-01 PROCEDURE — C1874 STENT, COATED/COV W/DEL SYS: HCPCS

## 2022-12-01 PROCEDURE — 7100000001 HC PACU RECOVERY - ADDTL 15 MIN

## 2022-12-01 PROCEDURE — 6370000000 HC RX 637 (ALT 250 FOR IP): Performed by: INTERNAL MEDICINE

## 2022-12-01 PROCEDURE — 6360000002 HC RX W HCPCS: Performed by: PHYSICIAN ASSISTANT

## 2022-12-01 PROCEDURE — C1769 GUIDE WIRE: HCPCS

## 2022-12-01 PROCEDURE — 49083 ABD PARACENTESIS W/IMAGING: CPT

## 2022-12-01 PROCEDURE — 6360000002 HC RX W HCPCS: Performed by: INTERNAL MEDICINE

## 2022-12-01 PROCEDURE — C1887 CATHETER, GUIDING: HCPCS

## 2022-12-01 PROCEDURE — 6360000002 HC RX W HCPCS: Performed by: ANESTHESIOLOGY

## 2022-12-01 PROCEDURE — 2720000010 HC SURG SUPPLY STERILE

## 2022-12-01 PROCEDURE — 96372 THER/PROPH/DIAG INJ SC/IM: CPT

## 2022-12-01 PROCEDURE — 6360000004 HC RX CONTRAST MEDICATION: Performed by: NURSE PRACTITIONER

## 2022-12-01 PROCEDURE — 6370000000 HC RX 637 (ALT 250 FOR IP): Performed by: NURSE PRACTITIONER

## 2022-12-01 PROCEDURE — 3700000001 HC ADD 15 MINUTES (ANESTHESIA)

## 2022-12-01 PROCEDURE — 2580000003 HC RX 258: Performed by: PHYSICIAN ASSISTANT

## 2022-12-01 PROCEDURE — 80053 COMPREHEN METABOLIC PANEL: CPT

## 2022-12-01 PROCEDURE — 85025 COMPLETE CBC W/AUTO DIFF WBC: CPT

## 2022-12-01 PROCEDURE — 2500000003 HC RX 250 WO HCPCS: Performed by: NURSE ANESTHETIST, CERTIFIED REGISTERED

## 2022-12-01 PROCEDURE — 3700000000 HC ANESTHESIA ATTENDED CARE

## 2022-12-01 PROCEDURE — 37182 INSERT HEPATIC SHUNT (TIPS): CPT

## 2022-12-01 PROCEDURE — 6360000002 HC RX W HCPCS: Performed by: SPECIALIST

## 2022-12-01 PROCEDURE — 85610 PROTHROMBIN TIME: CPT

## 2022-12-01 PROCEDURE — 85730 THROMBOPLASTIN TIME PARTIAL: CPT

## 2022-12-01 PROCEDURE — C1725 CATH, TRANSLUMIN NON-LASER: HCPCS

## 2022-12-01 PROCEDURE — 6360000002 HC RX W HCPCS: Performed by: NURSE ANESTHETIST, CERTIFIED REGISTERED

## 2022-12-01 PROCEDURE — 2580000003 HC RX 258: Performed by: INTERNAL MEDICINE

## 2022-12-01 RX ORDER — ATORVASTATIN CALCIUM 20 MG/1
20 TABLET, FILM COATED ORAL NIGHTLY
Status: DISCONTINUED | OUTPATIENT
Start: 2022-12-01 | End: 2022-12-02 | Stop reason: HOSPADM

## 2022-12-01 RX ORDER — ONDANSETRON 2 MG/ML
INJECTION INTRAMUSCULAR; INTRAVENOUS PRN
Status: DISCONTINUED | OUTPATIENT
Start: 2022-12-01 | End: 2022-12-01 | Stop reason: SDUPTHER

## 2022-12-01 RX ORDER — LANOLIN ALCOHOL/MO/W.PET/CERES
325 CREAM (GRAM) TOPICAL 2 TIMES DAILY WITH MEALS
Status: DISCONTINUED | OUTPATIENT
Start: 2022-12-01 | End: 2022-12-02 | Stop reason: HOSPADM

## 2022-12-01 RX ORDER — SODIUM CHLORIDE 0.9 % (FLUSH) 0.9 %
5-40 SYRINGE (ML) INJECTION EVERY 12 HOURS SCHEDULED
Status: DISCONTINUED | OUTPATIENT
Start: 2022-12-01 | End: 2022-12-02 | Stop reason: HOSPADM

## 2022-12-01 RX ORDER — SODIUM CHLORIDE 0.9 % (FLUSH) 0.9 %
5-40 SYRINGE (ML) INJECTION EVERY 12 HOURS SCHEDULED
Status: DISCONTINUED | OUTPATIENT
Start: 2022-12-01 | End: 2022-12-04 | Stop reason: HOSPADM

## 2022-12-01 RX ORDER — PANTOPRAZOLE SODIUM 40 MG/1
40 TABLET, DELAYED RELEASE ORAL
Status: DISCONTINUED | OUTPATIENT
Start: 2022-12-02 | End: 2022-12-02 | Stop reason: HOSPADM

## 2022-12-01 RX ORDER — SODIUM CHLORIDE 0.9 % (FLUSH) 0.9 %
5-40 SYRINGE (ML) INJECTION PRN
Status: DISCONTINUED | OUTPATIENT
Start: 2022-12-01 | End: 2022-12-04 | Stop reason: HOSPADM

## 2022-12-01 RX ORDER — ACETAMINOPHEN 325 MG/1
650 TABLET ORAL EVERY 6 HOURS PRN
Status: DISCONTINUED | OUTPATIENT
Start: 2022-12-01 | End: 2022-12-02 | Stop reason: HOSPADM

## 2022-12-01 RX ORDER — ROCURONIUM BROMIDE 10 MG/ML
INJECTION, SOLUTION INTRAVENOUS PRN
Status: DISCONTINUED | OUTPATIENT
Start: 2022-12-01 | End: 2022-12-01 | Stop reason: SDUPTHER

## 2022-12-01 RX ORDER — NADOLOL 20 MG/1
20 TABLET ORAL DAILY
Status: DISCONTINUED | OUTPATIENT
Start: 2022-12-01 | End: 2022-12-02 | Stop reason: HOSPADM

## 2022-12-01 RX ORDER — CALCIUM CARBONATE 200(500)MG
1000 TABLET,CHEWABLE ORAL 3 TIMES DAILY PRN
Status: DISCONTINUED | OUTPATIENT
Start: 2022-12-01 | End: 2022-12-02 | Stop reason: HOSPADM

## 2022-12-01 RX ORDER — DIPHENHYDRAMINE HYDROCHLORIDE 50 MG/ML
12.5 INJECTION INTRAMUSCULAR; INTRAVENOUS
Status: ACTIVE | OUTPATIENT
Start: 2022-12-01 | End: 2022-12-02

## 2022-12-01 RX ORDER — LACTULOSE 10 G/15ML
20 SOLUTION ORAL 3 TIMES DAILY
Status: DISCONTINUED | OUTPATIENT
Start: 2022-12-01 | End: 2022-12-02 | Stop reason: HOSPADM

## 2022-12-01 RX ORDER — ALBUMIN (HUMAN) 12.5 G/50ML
50 SOLUTION INTRAVENOUS ONCE
Status: DISCONTINUED | OUTPATIENT
Start: 2022-12-01 | End: 2022-12-04 | Stop reason: HOSPADM

## 2022-12-01 RX ORDER — SODIUM CHLORIDE 9 MG/ML
INJECTION, SOLUTION INTRAVENOUS PRN
Status: DISCONTINUED | OUTPATIENT
Start: 2022-12-01 | End: 2022-12-02 | Stop reason: HOSPADM

## 2022-12-01 RX ORDER — POLYETHYLENE GLYCOL 3350 17 G/17G
17 POWDER, FOR SOLUTION ORAL DAILY PRN
Status: DISCONTINUED | OUTPATIENT
Start: 2022-12-01 | End: 2022-12-02 | Stop reason: HOSPADM

## 2022-12-01 RX ORDER — ENOXAPARIN SODIUM 100 MG/ML
40 INJECTION SUBCUTANEOUS DAILY
Status: DISCONTINUED | OUTPATIENT
Start: 2022-12-01 | End: 2022-12-02 | Stop reason: HOSPADM

## 2022-12-01 RX ORDER — PROPOFOL 10 MG/ML
INJECTION, EMULSION INTRAVENOUS PRN
Status: DISCONTINUED | OUTPATIENT
Start: 2022-12-01 | End: 2022-12-01 | Stop reason: SDUPTHER

## 2022-12-01 RX ORDER — FENTANYL CITRATE 50 UG/ML
INJECTION, SOLUTION INTRAMUSCULAR; INTRAVENOUS PRN
Status: DISCONTINUED | OUTPATIENT
Start: 2022-12-01 | End: 2022-12-01 | Stop reason: SDUPTHER

## 2022-12-01 RX ORDER — SODIUM CHLORIDE, SODIUM LACTATE, POTASSIUM CHLORIDE, CALCIUM CHLORIDE 600; 310; 30; 20 MG/100ML; MG/100ML; MG/100ML; MG/100ML
INJECTION, SOLUTION INTRAVENOUS CONTINUOUS PRN
Status: DISCONTINUED | OUTPATIENT
Start: 2022-12-01 | End: 2022-12-01 | Stop reason: SDUPTHER

## 2022-12-01 RX ORDER — MAGNESIUM SULFATE 1 G/100ML
1000 INJECTION INTRAVENOUS PRN
Status: DISCONTINUED | OUTPATIENT
Start: 2022-12-01 | End: 2022-12-02 | Stop reason: HOSPADM

## 2022-12-01 RX ORDER — ACETAMINOPHEN 650 MG/1
650 SUPPOSITORY RECTAL EVERY 6 HOURS PRN
Status: DISCONTINUED | OUTPATIENT
Start: 2022-12-01 | End: 2022-12-02 | Stop reason: HOSPADM

## 2022-12-01 RX ORDER — DIPHENHYDRAMINE HYDROCHLORIDE 50 MG/ML
25 INJECTION INTRAMUSCULAR; INTRAVENOUS ONCE
Status: COMPLETED | OUTPATIENT
Start: 2022-12-01 | End: 2022-12-01

## 2022-12-01 RX ORDER — FUROSEMIDE 20 MG/1
20 TABLET ORAL 2 TIMES DAILY
Status: DISCONTINUED | OUTPATIENT
Start: 2022-12-01 | End: 2022-12-02 | Stop reason: HOSPADM

## 2022-12-01 RX ORDER — ONDANSETRON 2 MG/ML
4 INJECTION INTRAMUSCULAR; INTRAVENOUS
Status: COMPLETED | OUTPATIENT
Start: 2022-12-01 | End: 2022-12-01

## 2022-12-01 RX ORDER — ONDANSETRON 4 MG/1
4 TABLET, ORALLY DISINTEGRATING ORAL EVERY 8 HOURS PRN
Status: DISCONTINUED | OUTPATIENT
Start: 2022-12-01 | End: 2022-12-02 | Stop reason: HOSPADM

## 2022-12-01 RX ORDER — MORPHINE SULFATE 2 MG/ML
2 INJECTION, SOLUTION INTRAMUSCULAR; INTRAVENOUS EVERY 5 MIN PRN
Status: DISCONTINUED | OUTPATIENT
Start: 2022-12-01 | End: 2022-12-04 | Stop reason: HOSPADM

## 2022-12-01 RX ORDER — POTASSIUM CHLORIDE 7.45 MG/ML
10 INJECTION INTRAVENOUS PRN
Status: DISCONTINUED | OUTPATIENT
Start: 2022-12-01 | End: 2022-12-02 | Stop reason: HOSPADM

## 2022-12-01 RX ORDER — FENTANYL CITRATE 50 UG/ML
25 INJECTION, SOLUTION INTRAMUSCULAR; INTRAVENOUS EVERY 5 MIN PRN
Status: DISCONTINUED | OUTPATIENT
Start: 2022-12-01 | End: 2022-12-04 | Stop reason: HOSPADM

## 2022-12-01 RX ORDER — SODIUM CHLORIDE 9 MG/ML
INJECTION, SOLUTION INTRAVENOUS CONTINUOUS
Status: DISCONTINUED | OUTPATIENT
Start: 2022-12-01 | End: 2022-12-04 | Stop reason: HOSPADM

## 2022-12-01 RX ORDER — SODIUM CHLORIDE 9 MG/ML
INJECTION, SOLUTION INTRAVENOUS PRN
Status: DISCONTINUED | OUTPATIENT
Start: 2022-12-01 | End: 2022-12-04 | Stop reason: HOSPADM

## 2022-12-01 RX ORDER — MIDODRINE HYDROCHLORIDE 5 MG/1
10 TABLET ORAL 3 TIMES DAILY
Status: DISCONTINUED | OUTPATIENT
Start: 2022-12-01 | End: 2022-12-02 | Stop reason: HOSPADM

## 2022-12-01 RX ORDER — OXYCODONE HYDROCHLORIDE 5 MG/1
5 TABLET ORAL EVERY 4 HOURS PRN
Status: DISCONTINUED | OUTPATIENT
Start: 2022-12-01 | End: 2022-12-02 | Stop reason: HOSPADM

## 2022-12-01 RX ORDER — SODIUM CHLORIDE 0.9 % (FLUSH) 0.9 %
10 SYRINGE (ML) INJECTION PRN
Status: DISCONTINUED | OUTPATIENT
Start: 2022-12-01 | End: 2022-12-02 | Stop reason: HOSPADM

## 2022-12-01 RX ORDER — SPIRONOLACTONE 100 MG/1
100 TABLET, FILM COATED ORAL EVERY EVENING
Status: DISCONTINUED | OUTPATIENT
Start: 2022-12-01 | End: 2022-12-02 | Stop reason: HOSPADM

## 2022-12-01 RX ORDER — PANTOPRAZOLE SODIUM 40 MG/1
TABLET, DELAYED RELEASE ORAL
COMMUNITY
Start: 2022-10-04

## 2022-12-01 RX ORDER — FLUOXETINE HYDROCHLORIDE 20 MG/1
20 CAPSULE ORAL DAILY
Status: DISCONTINUED | OUTPATIENT
Start: 2022-12-01 | End: 2022-12-02 | Stop reason: HOSPADM

## 2022-12-01 RX ORDER — POTASSIUM CHLORIDE 20 MEQ/1
40 TABLET, EXTENDED RELEASE ORAL PRN
Status: DISCONTINUED | OUTPATIENT
Start: 2022-12-01 | End: 2022-12-02 | Stop reason: HOSPADM

## 2022-12-01 RX ORDER — PANTOPRAZOLE SODIUM 40 MG/1
40 TABLET, DELAYED RELEASE ORAL ONCE
Status: COMPLETED | OUTPATIENT
Start: 2022-12-01 | End: 2022-12-01

## 2022-12-01 RX ADMIN — PANTOPRAZOLE SODIUM 40 MG: 40 TABLET, DELAYED RELEASE ORAL at 21:50

## 2022-12-01 RX ADMIN — ALBUMIN HUMAN 25 G: 0.25 SOLUTION INTRAVENOUS at 12:25

## 2022-12-01 RX ADMIN — FENTANYL CITRATE 100 MCG: 0.05 INJECTION, SOLUTION INTRAMUSCULAR; INTRAVENOUS at 12:14

## 2022-12-01 RX ADMIN — ALBUMIN HUMAN 25 G: 0.25 SOLUTION INTRAVENOUS at 12:43

## 2022-12-01 RX ADMIN — SPIRONOLACTONE 100 MG: 100 TABLET ORAL at 17:06

## 2022-12-01 RX ADMIN — CEFTRIAXONE SODIUM 1000 MG: 10 INJECTION, POWDER, FOR SOLUTION INTRAVENOUS at 12:30

## 2022-12-01 RX ADMIN — FENTANYL CITRATE 25 MCG: 50 INJECTION, SOLUTION INTRAMUSCULAR; INTRAVENOUS at 14:10

## 2022-12-01 RX ADMIN — ANTACID TABLETS 1000 MG: 500 TABLET, CHEWABLE ORAL at 23:59

## 2022-12-01 RX ADMIN — ONDANSETRON 4 MG: 4 TABLET, ORALLY DISINTEGRATING ORAL at 15:52

## 2022-12-01 RX ADMIN — ROCURONIUM BROMIDE 20 MG: 10 INJECTION INTRAVENOUS at 12:40

## 2022-12-01 RX ADMIN — ENOXAPARIN SODIUM 40 MG: 100 INJECTION SUBCUTANEOUS at 16:08

## 2022-12-01 RX ADMIN — ATORVASTATIN CALCIUM 20 MG: 20 TABLET, FILM COATED ORAL at 21:49

## 2022-12-01 RX ADMIN — IOPAMIDOL 21 ML: 755 INJECTION, SOLUTION INTRAVENOUS at 13:35

## 2022-12-01 RX ADMIN — OXYCODONE 5 MG: 5 TABLET ORAL at 17:06

## 2022-12-01 RX ADMIN — DIPHENHYDRAMINE HYDROCHLORIDE 25 MG: 50 INJECTION, SOLUTION INTRAMUSCULAR; INTRAVENOUS at 22:16

## 2022-12-01 RX ADMIN — ONDANSETRON 4 MG: 2 INJECTION INTRAMUSCULAR; INTRAVENOUS at 13:56

## 2022-12-01 RX ADMIN — FLUOXETINE HYDROCHLORIDE 20 MG: 20 CAPSULE ORAL at 16:08

## 2022-12-01 RX ADMIN — ALBUMIN HUMAN 25 G: 0.25 SOLUTION INTRAVENOUS at 12:12

## 2022-12-01 RX ADMIN — ROCURONIUM BROMIDE 50 MG: 10 INJECTION INTRAVENOUS at 12:14

## 2022-12-01 RX ADMIN — MORPHINE SULFATE 2 MG: 2 INJECTION, SOLUTION INTRAMUSCULAR; INTRAVENOUS at 14:00

## 2022-12-01 RX ADMIN — ANTACID TABLETS 1000 MG: 500 TABLET, CHEWABLE ORAL at 21:50

## 2022-12-01 RX ADMIN — ONDANSETRON 4 MG: 2 INJECTION INTRAMUSCULAR; INTRAVENOUS at 13:13

## 2022-12-01 RX ADMIN — OXYCODONE 5 MG: 5 TABLET ORAL at 21:49

## 2022-12-01 RX ADMIN — SODIUM CHLORIDE, PRESERVATIVE FREE 10 ML: 5 INJECTION INTRAVENOUS at 21:51

## 2022-12-01 RX ADMIN — FENTANYL CITRATE 50 MCG: 0.05 INJECTION, SOLUTION INTRAMUSCULAR; INTRAVENOUS at 13:06

## 2022-12-01 RX ADMIN — PROPOFOL 150 MG: 10 INJECTION, EMULSION INTRAVENOUS at 12:14

## 2022-12-01 RX ADMIN — ONDANSETRON 4 MG: 4 TABLET, ORALLY DISINTEGRATING ORAL at 21:50

## 2022-12-01 RX ADMIN — SODIUM CHLORIDE, POTASSIUM CHLORIDE, SODIUM LACTATE AND CALCIUM CHLORIDE: 600; 310; 30; 20 INJECTION, SOLUTION INTRAVENOUS at 12:12

## 2022-12-01 RX ADMIN — NADOLOL 20 MG: 20 TABLET ORAL at 16:08

## 2022-12-01 RX ADMIN — SUGAMMADEX 200 MG: 100 INJECTION, SOLUTION INTRAVENOUS at 13:15

## 2022-12-01 RX ADMIN — MIDODRINE HYDROCHLORIDE 10 MG: 5 TABLET ORAL at 21:25

## 2022-12-01 RX ADMIN — FUROSEMIDE 20 MG: 20 TABLET ORAL at 21:49

## 2022-12-01 RX ADMIN — FENTANYL CITRATE 25 MCG: 50 INJECTION, SOLUTION INTRAMUSCULAR; INTRAVENOUS at 14:20

## 2022-12-01 RX ADMIN — FERROUS SULFATE TAB EC 325 MG (65 MG FE EQUIVALENT) 325 MG: 325 (65 FE) TABLET DELAYED RESPONSE at 16:08

## 2022-12-01 ASSESSMENT — PAIN SCALES - GENERAL
PAINLEVEL_OUTOF10: 10
PAINLEVEL_OUTOF10: 5
PAINLEVEL_OUTOF10: 6
PAINLEVEL_OUTOF10: 10
PAINLEVEL_OUTOF10: 5
PAINLEVEL_OUTOF10: 10

## 2022-12-01 ASSESSMENT — PAIN DESCRIPTION - DESCRIPTORS
DESCRIPTORS: ACHING

## 2022-12-01 ASSESSMENT — PAIN DESCRIPTION - LOCATION
LOCATION: ABDOMEN
LOCATION: ABDOMEN;OTHER (COMMENT)
LOCATION: ABDOMEN

## 2022-12-01 ASSESSMENT — PAIN DESCRIPTION - FREQUENCY
FREQUENCY: CONTINUOUS
FREQUENCY: CONTINUOUS

## 2022-12-01 ASSESSMENT — PAIN - FUNCTIONAL ASSESSMENT
PAIN_FUNCTIONAL_ASSESSMENT: ACTIVITIES ARE NOT PREVENTED
PAIN_FUNCTIONAL_ASSESSMENT: ACTIVITIES ARE NOT PREVENTED
PAIN_FUNCTIONAL_ASSESSMENT: 0-10
PAIN_FUNCTIONAL_ASSESSMENT: ACTIVITIES ARE NOT PREVENTED

## 2022-12-01 ASSESSMENT — ENCOUNTER SYMPTOMS: SHORTNESS OF BREATH: 1

## 2022-12-01 ASSESSMENT — PAIN DESCRIPTION - PAIN TYPE
TYPE: ACUTE PAIN
TYPE: ACUTE PAIN

## 2022-12-01 ASSESSMENT — PAIN DESCRIPTION - ORIENTATION
ORIENTATION: RIGHT;LOWER
ORIENTATION: RIGHT;LOWER

## 2022-12-01 ASSESSMENT — PAIN DESCRIPTION - ONSET
ONSET: ON-GOING
ONSET: ON-GOING

## 2022-12-01 NOTE — H&P
[facility]  Office: [address], DO, Mari Katy, DO, Hazel Noble, DO, Rossikathe Sanchez, DO, Anna Marie Corona MD, Ashely Jarquin MD, Cordelia Klein MD, Devante Thomson MD,  Eric Armijo MD, Jayde Theodore MD, Ellie Briggs, DO, Raad Dave MD,  Brittanie Orlando MD, Patrick Casillas MD, Diamond Perez DO, Rasheed Mcneal MD, Myrna Ernandez MD, Mohan John DO, Dre Godoy MD, Patricia Santoyo MD, Joanne Keen MD, Carmen Rosa MD, Nirmal Hoffman DO, John Garcia MD, Michelle Silva MD, Eduin Bee, [facility],  Homero Diaz, CNP, Eder Be, CNP, Nicolas Hunt, CNP,  Marcos Rodriguez, [facility], Nan Lowry, CNP, Ashley Valentine, CNP, Luis Zuly, CNP, Chilango Quinonez, CNP, Tracee Guzman, CNP, Franklin Unger PA-C, Naveen Whitley, CNS, Gil Evans, CNP, Edyta Henson, [facility]         [address] [facility]    HISTORY AND PHYSICAL EXAMINATION            Date:   12/1/2022  Patient name:  Ata Harry  Date of admission:  12/1/2022  3:10 PM  MRN:   [id number]  Account:  [de-identified]  YOB: 1960  PCP:    VASU Agee  Room:   2007/2007-01  Code Status:    Full Code    Chief Complaint:     S/p tips    History Obtained From:     patient, electronic medical record    History of Present Illness:     Ata Harry is a 60 y.o. Non- / non  male who presents with No chief complaint on file. and is admitted to the hospital for the management of S/P TIPS (transjugular intrahepatic portosystemic shunt). This 60 yom presents for admission after having paracentesis and TIPS done today for alcoholic cirrhosis.   IR wanted him admitted and observed overnight and if stable can be discharged in am.  His only complaint currently is abd pain    Past Medical History:     Past Medical History:   Diagnosis Date    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 ([facility]     Alcoholic cirrhosis of liver with ascites ([facility] Anemia 04/13/2022    Anxiety     Arthritis     Back pain, chronic     dr. Maru Martínez, orthopedic, every 3-4 months, gets steroid injection    Lezama esophagus     BPH (benign prostatic hypertrophy)     Cholelithiasis     Cirrhosis ([address]     COVID-19 12/2020    pt reports he had a positive test while at [facility] in 2020, was asymptomatic    COVID-19 vaccine series completed 5/20/2021, 6/22/2021    Moderna 5/20/2021, 6/22/2021    DDD (degenerative disc disease), lumbar     Depression     Esophageal cancer ([address]     INVASIVE ADENOCARCINOMA ARISING IN TUBULAR ADENOMA WITH HIGH GRADE DYSPLASIA, ASSOCIATED WITH FOCAL INTESTINAL METAPLASIA     Esophageal varices ([address]     Fatty liver     GERD (gastroesophageal reflux disease)     Hep C w/o coma, chronic ([address]     History of alcohol abuse     6-12 beers a day; quit drinking 2019    History of blood transfusion     History of colon polyps 2016    History of tobacco abuse     [place] (hard of hearing)     Hyperlipidemia     Hypertension     Hyponatremia 07/20/2016    Port-A-Cath in place     right upper chest    Portal hypertension ([address]     Sciatica     Secondary esophageal varices ([address] 06/07/2022    Shortness of breath     Spinal stenosis     Stomach ulcer     hx of    Tubular adenoma of colon 2016, 2018    Vitamin D deficiency     Wears glasses         Past Surgical History:     Past Surgical History:   Procedure Laterality Date    BUNIONECTOMY      twice on right side    BUNIONECTOMY Left     CARPAL TUNNEL RELEASE Right     COLONOSCOPY      at age 35    COLONOSCOPY  10/05/2016    polyps-pathology tubular adenoma, and abnormal looking mucosa right colon-pathology-tubular adenoma    COLONOSCOPY N/A 03/30/2018    COLONOSCOPY POLYPECTOMY COLD BIOPSY performed by Kristy Cooper MD [place] [address] [address]  03/30/2018    Small polyp in the sigmoid colon and excised with biopsy forceps--tubular adenoma    COLONOSCOPY N/A 04/16/2022    COLONOSCOPY POLYPECTOMY performed by Melani Carter MD at [facility], COLON, DIAGNOSTIC      EGD    IR PORT PLACEMENT EQUAL OR GREATER THAN 5 YEARS  04/19/2021    IR PORT PLACEMENT EQUAL OR GREATER THAN 5 YEARS 4/19/2021 [facility]    IR TIPS INSERTION  12/01/2022    IR TIPS INSERTION 12/1/2022 Andre Vergara MD [facility]    KNEE SURGERY Left     cyst removed    NASAL SEPTUM SURGERY      NERVE BLOCK Right 11/23/2020    NERVE BLOCK RIGHT CERVICAL STEROID INJECTION  C3-C6 performed by Ashley Merritt MD at [address] [facility] [place]  01/04/2016    steroid injection C7 T1    OTHER SURGICAL HISTORY  11/21/2016    Bilateral Lumbar CACHORRO L4-L5 injections    OTHER SURGICAL HISTORY  12/19/2016    lumbar steroid injection    OTHER SURGICAL HISTORY  09/28/2018    BILATERAL L5 CACHORRO (N/A Back)    OTHER SURGICAL HISTORY Right 11/23/2020    cervical injection    PAIN MANAGEMENT PROCEDURE Left 07/09/2020    EPIDURAL STEROID INJECTION LEFT L4 L5 performed by Ashley Merritt MD at [address] Left 07/20/2020    LEFT L4 L5 EPIDURAL STEROID INJECTION performed by Ashley Merritt MD at [address] Bilateral 08/17/2020    LUMBAR FACET BILATERAL L2-L5 performed by Ashley Merritt MD at [address] Bilateral 12/07/2020    NERVE BLOCK BILATERAL LUMBAR MEDIAL BRANCH L2-L5 performed by Ashley Merritt MD at [address] [facility] [address] AA&/STRD TFRML EPI LUMBAR/SACRAL 1 LEVEL Bilateral 09/06/2018    BILATERAL L5 CACHORRO performed by Ashley Merritt MD at [facility] AA&/STRD TFRML EPI LUMBAR/SACRAL 1 LEVEL N/A 09/28/2018    BILATERAL L5 CACHORRO performed by Ashley Merritt MD at [address] [facility] N/CARPAL TUNNEL SURG Right 08/29/2017    CARPAL TUNNEL RELEASE RIGHT performed by Jonathan Murray MD at [address] [facility] N/CARPAL TUNNEL SURG Left 10/31/2017    CARPAL TUNNEL RELEASE performed by Jonathan Murray MD at [address] UPPER GASTROINTESTINAL ENDOSCOPY N/A 12/29/2020    EGD BIOPSY performed by Kiki Rabago MD at [address] [place] [address] 02/02/2021    EGD BIOPSY and spot marking performed by Booker Bailey MD at [address] [place] [address] 02/12/2021    ENDOSCOPIC ULTRASOUND, EGD performed by Sanchez Lanza MD at [facility] 1  02/12/2021    EGD DIAGNOSTIC ONLY performed by Sanchez Lanza MD at [facility] 1 08/31/2021    EGD BIOPSY performed by Booker Bailey MD at [address] [place] [address] 01/21/2022    EGD BIOPSY performed by Booker Bailey MD at [address] [place] [address] N/A 04/15/2022    EGD ESOPHAGOGASTRODUODENOSCOPY performed by Kiki Rabago MD at [address] [place] [address] 06/06/2022    EGD BAND LIGATION performed by Todd Acevedo MD at [address] [place] [address] N/A 06/09/2022    EGD ESOPHAGOGASTRODUODENOSCOPY performed by Todd Acevedo MD at [address] [place] [address] N/A 09/14/2022    EGD ESOPHAGOGASTRODUODENOSCOPY ENDOSCOPIC APC AT GE JUNCTION performed by Clarke Anders MD at [address] [place] [address] 10/19/2022    EGD BIOPSY performed by Booker Bailey MD at [address] [place] [address] 10/31/2022    EGD with APC performed by Booker Bailey MD at [address] [place] [address]          Medications Prior to Admission:     Prior to Admission medications    Medication Sig Start Date End Date Taking?  Authorizing Provider   pantoprazole (PROTONIX) 40 MG tablet  10/4/22   Historical Provider, MD   FEROSUL 325 (65 Fe) MG tablet take 1 tablet by mouth twice a day 10/7/22   VASU Gardner   nadolol (CORGARD) 20 MG tablet take 1 tablet by mouth once daily 8/26/22   MASSIMO Naqvi NP (PROAMATINE) 5 MG tablet Take 2 tablets by mouth in the morning and 2 tablets at noon and 2 tablets before bedtime. 8/4/22   MASSIMO Pratt - NP   furosemide (LASIX) 20 MG tablet Take 1 tablet by mouth 2 times daily 6/23/22   Rohini Cheng APRN - NP   spironolactone (ALDACTONE) 100 MG tablet Take 1 tablet by mouth every evening 6/23/22   MASSIMO Pratt - NP   ondansetron (ZOFRAN-ODT) 4 MG disintegrating tablet dissolve 1 tablet by mouth every 8 hours if needed for nausea and vomiting 5/31/22   VASU Fernandez   lactulose [facility]) 10 GM/15ML solution take 30 milliliters by mouth three times a day 5/31/22   VASU Fernandez   FLUoxetine (PROZAC) 20 MG capsule Take 1 capsule by mouth daily 4/21/22   Dotty Duvall MD   atorvastatin (LIPITOR) 20 MG tablet Take 1 tablet by mouth nightly 4/21/22   Dotty Duvall MD        Allergies:     Patient has no known allergies. Social History:     Tobacco:    reports that he quit smoking about 5 years ago. His smoking use included cigarettes. He has a 45.00 pack-year smoking history. He has never used smokeless tobacco.  Alcohol:      reports that he does not currently use alcohol. Drug Use:  reports that he does not currently use drugs after having used the following drugs: Cocaine. Frequency: 1.00 time per week. Family History:     Family History   Problem Relation Age of Onset    Cancer Mother         pancreatic    Cancer Father         bone    Diabetes Sister     Asthma Brother        Review of Systems:     Positive and Negative as described in HPI.     CONSTITUTIONAL:  negative for fevers, chills, sweats, fatigue, weight loss  HEENT:  negative for vision, hearing changes, runny nose, throat pain  RESPIRATORY:  negative for shortness of breath, cough, congestion, wheezing  CARDIOVASCULAR:  negative for chest pain, palpitations  GASTROINTESTINAL:  negative for nausea, vomiting, diarrhea, constipation, change in bowel habits  GENITOURINARY: negative for difficulty of urination, burning with urination, frequency   INTEGUMENT:  negative for rash, skin lesions  HEMATOLOGIC/LYMPHATIC:  negative for swelling/edema   ALLERGIC/IMMUNOLOGIC:  negative for urticaria , itching  ENDOCRINE:  negative increase in drinking, increase in urination, hot or cold intolerance  MUSCULOSKELETAL:  negative joint pains, muscle aches, swelling of joints  NEUROLOGICAL:  negative for headaches, dizziness, lightheadedness, numbness, pain, tingling extremities  BEHAVIOR/PSYCH:  negative for depression, anxiety    Physical Exam:   /67   Pulse 88   Temp 98.3 °F (36.8 °C) (Oral)   Resp 18   Ht 5' 10\" (1.778 m)   Wt 180 lb (81.6 kg)   SpO2 97%   BMI 25.83 kg/m²   Temp (24hrs), Av.5 °F (36.4 °C), Min:97 °F (36.1 °C), Max:98.3 °F (36.8 °C)    No results for input(s): POCGLU in the last 72 hours.   No intake or output data in the 24 hours ending 22 1657    General Appearance: alert, well appearing, and in no acute distress  Mental status: oriented to person, place, and time  Head: normocephalic, atraumatic  Eye: no icterus, redness, pupils equal and reactive, extraocular eye movements intact, conjunctiva clear  Ear: normal external ear, no discharge, hearing intact  Nose: no drainage noted  Mouth: mucous membranes moist  Neck: supple, no carotid bruits, thyroid not palpable  Lungs: Bilateral equal air entry, clear to ausculation, no wheezing, rales or rhonchi, normal effort  Cardiovascular: normal rate, regular rhythm, no murmur, gallop, rub  Abdomen: Soft, nontender, nondistended, normal bowel sounds, no hepatomegaly or splenomegaly  Neurologic: There are no new focal motor or sensory deficits, normal muscle tone and bulk, no abnormal sensation, normal speech, cranial nerves II through XII grossly intact  Skin: multiple bruises  Extremities: peripheral pulses palpable, no pedal edema or calf pain with palpation  Psych: normal affect    Investigations: Laboratory Testing:  Recent Results (from the past 24 hour(s))   POTASSIUM (POC)    Collection Time: 12/01/22 10:55 AM   Result Value Ref Range    POC Potassium 4.2 3.5 - 4.5 mmol/L   Protime-INR    Collection Time: 12/01/22 11:00 AM   Result Value Ref Range    Protime 11.6 9.1 - 12.3 sec    INR 1.1    APTT    Collection Time: 12/01/22 11:00 AM   Result Value Ref Range    PTT 26.9 20.5 - 30.5 sec   Comprehensive metabolic panel    Collection Time: 12/01/22 11:00 AM   Result Value Ref Range    Glucose 100 (H) 70 - 99 mg/dL    BUN 8 8 - 23 mg/dL    Creatinine 0.46 (L) 0.70 - 1.20 mg/dL    Est, Glom Filt Rate >60 >60 mL/min/1.73m2    Calcium 8.4 (L) 8.6 - 10.4 mg/dL    Sodium 129 (L) 135 - 144 mmol/L    Potassium 4.1 3.7 - 5.3 mmol/L    Chloride 96 (L) 98 - 107 mmol/L    CO2 19 (L) 20 - 31 mmol/L    Anion Gap 14 9 - 17 mmol/L    Alkaline Phosphatase 114 40 - 129 U/L    ALT 11 5 - 41 U/L    AST 30 <40 U/L    Total Bilirubin 0.9 0.3 - 1.2 mg/dL    Total Protein 5.9 (L) 6.4 - 8.3 g/dL    Albumin 3.2 (L) 3.5 - 5.2 g/dL    Albumin/Globulin Ratio 1.2 1.0 - 2.5   CBC with Auto Differential    Collection Time: 12/01/22 11:00 AM   Result Value Ref Range    WBC 6.4 3.5 - 11.3 k/uL    RBC 3.20 (L) 4.21 - 5.77 m/uL    Hemoglobin 8.0 (L) 13.0 - 17.0 g/dL    Hematocrit 28.1 (L) 40.7 - 50.3 %    MCV 87.8 82.6 - 102.9 fL    MCH 25.0 (L) 25.2 - 33.5 pg    MCHC 28.5 28.4 - 34.8 g/dL    RDW 15.8 (H) 11.8 - 14.4 %    Platelets 745 762 - 576 k/uL    MPV 9.8 8.1 - 13.5 fL    NRBC Automated 0.0 0.0 per 100 WBC    Immature Granulocytes 1 (H) 0 %    Seg Neutrophils 77 (H) 36 - 65 %    Lymphocytes 6 (L) 24 - 43 %    Monocytes 15 (H) 3 - 12 %    Eosinophils % 1 1 - 4 %    Basophils 0 0 - 2 %    Absolute Immature Granulocyte 0.06 0.00 - 0.30 k/uL    Segs Absolute 4.94 1.50 - 8.10 k/uL    Absolute Lymph # 0.38 (L) 1.10 - 3.70 k/uL    Absolute Mono # 0.96 0.10 - 1.20 k/uL    Absolute Eos # 0.06 0.00 - 0.44 k/uL    Basophils Absolute 0.00 0.00 - 0.20 k/uL    Morphology ANISOCYTOSIS PRESENT        Imaging/Diagnostics:  IR TIPS INSERTION    Result Date: 12/1/2022  Successful placement of tips shunt 5 cm via tore graft from right hepatic vein to left portal vein with good flow. IR US GUIDED PARACENTESIS    Result Date: 11/27/2022  Successful ultrasound-guided therapeutic paracentesis.        Assessment :      Hospital Problems             Last Modified POA    * (Principal) S/P TIPS (transjugular intrahepatic portosystemic shunt) 12/1/2022 Yes    Portal hypertension ([address] 12/1/2022 Yes    Hepatic cirrhosis ([address] (Chronic) 12/1/2022 Yes    Essential hypertension 12/1/2022 Yes       Plan:     Patient status observation in the Progressive Unit/Step down    Check ammonia in am  Monitor hgb  Monitor for any complications  Oxycodone for pain; avoid nsaids as he states he just had egd that showed pud  Anticipate dc home 24h if stable    Consultations:   None      Sami Sanchez,   12/1/2022  4:57 PM    Copy sent to VASU Powell

## 2022-12-01 NOTE — H&P
History and Physical    Pt Name: Padmini Gilbert  MRN: [id number]  YOB: 1960  Date of evaluation: 12/1/2022    SUBJECTIVE:   History of Chief Complaint:    Patient presents preprocedure for TIPS procedure. Patient has a history of cirrhosis of the liver, ascites, portal hypertension. Patient says that he has been compliant with his weekly paracentesis treatments. He has been feeling well overall he states, as long as he keeps up with treatments. He has history of esophageal varices, s/p banding and cautery in the past.  He has been scheduled for TIPS procedure today. Patient states that he is due for his paracentesis tomorrow, typically has this at [facility] but is to be admitted here after procedure today. Past Medical History    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 ([address] Alcoholic cirrhosis of liver with ascites ([address] Anemia, Anxiety, Arthritis, Back pain, chronic, Lezama esophagus, BPH (benign prostatic hypertrophy), Cholelithiasis, Cirrhosis ([address] COVID-19, COVID-19 vaccine series completed, DDD (degenerative disc disease), lumbar, Depression, Esophageal cancer ([address] Esophageal varices ([address] Fatty liver, GERD (gastroesophageal reflux disease), Hep C w/o coma, chronic ([address] History of alcohol abuse, History of blood transfusion, History of colon polyps, History of tobacco abuse, [place] (hard of hearing), Hyperlipidemia, Hypertension, Hyponatremia, Port-A-Cath in place, Portal hypertension ([address] Sciatica, Secondary esophageal varices ([address] Shortness of breath, Spinal stenosis, Stomach ulcer, Tubular adenoma of colon, Vitamin D deficiency, and Wears glasses. Past Surgical History   has a past surgical history that includes Bunionectomy; Nasal septum surgery; other surgical history (01/04/2016); Colonoscopy; Colonoscopy (10/05/2016); other surgical history (11/21/2016); other surgical history (12/19/2016); knee surgery (Left); Bunionectomy (Left);  Endoscopy, colon, diagnostic; pr revise median n/carpal tunnel surg (Right, 08/29/2017); Carpal tunnel release (Right); pr revise median n/carpal tunnel surg (Left, 10/31/2017); Colonoscopy (N/A, 03/30/2018); Colonoscopy (03/30/2018); pr njx aa&/strd tfrml epi lumbar/sacral 1 level (Bilateral, 09/06/2018); other surgical history (09/28/2018); pr njx aa&/strd tfrml epi lumbar/sacral 1 level (N/A, 09/28/2018); Pain management procedure (Left, 07/09/2020); Pain management procedure (Left, 07/20/2020); Pain management procedure (Bilateral, 08/17/2020); other surgical history (Right, 11/23/2020); Nerve Block (Right, 11/23/2020); Pain management procedure (Bilateral, 12/07/2020); Upper gastrointestinal endoscopy (N/A, 12/29/2020); Upper gastrointestinal endoscopy (N/A, 02/02/2021); Upper gastrointestinal endoscopy (N/A, 02/12/2021); Upper gastrointestinal endoscopy (02/12/2021); [place] tooth extraction; IR PORT PLACEMENT > 5 YEARS (04/19/2021); Upper gastrointestinal endoscopy (N/A, 08/31/2021); Upper gastrointestinal endoscopy (N/A, 01/21/2022); Upper gastrointestinal endoscopy (N/A, 04/15/2022); Colonoscopy (N/A, 04/16/2022); Upper gastrointestinal endoscopy (N/A, 06/06/2022); Upper gastrointestinal endoscopy (N/A, 06/09/2022); Paracentesis; Upper gastrointestinal endoscopy (N/A, 9/14/2022); Upper gastrointestinal endoscopy (N/A, 10/19/2022); and Upper gastrointestinal endoscopy (N/A, 10/31/2022). Medications  Prior to Admission medications    Medication Sig Start Date End Date Taking? Authorizing Provider   pantoprazole (PROTONIX) 40 MG tablet  10/4/22   Historical Provider, MD   FEROSUL 325 (65 Fe) MG tablet take 1 tablet by mouth twice a day 10/7/22   VASU Sheth   nadolol (CORGARD) 20 MG tablet take 1 tablet by mouth once daily 8/26/22   MASSIMO Schwarz NP   midodrine (PROAMATINE) 5 MG tablet Take 2 tablets by mouth in the morning and 2 tablets at noon and 2 tablets before bedtime.  8/4/22   Anya Bullock APRN - NP   furosemide (LASIX) 20 MG tablet Take 1 tablet by mouth 2 times daily 6/23/22   Anya Bullock APRN - NP   spironolactone (ALDACTONE) 100 MG tablet Take 1 tablet by mouth every evening 6/23/22   Anya Bullock APRN - NP   ondansetron (ZOFRAN-ODT) 4 MG disintegrating tablet dissolve 1 tablet by mouth every 8 hours if needed for nausea and vomiting 5/31/22   VASU Sheth   lactulose [facility]) 10 GM/15ML solution take 30 milliliters by mouth three times a day 5/31/22   VASU Sheth   FLUoxetine (PROZAC) 20 MG capsule Take 1 capsule by mouth daily 4/21/22   Deven Diallo MD   atorvastatin (LIPITOR) 20 MG tablet Take 1 tablet by mouth nightly 4/21/22   Deven Diallo MD     Allergies  has No Known Allergies. Family History  family history includes Asthma in his brother; Cancer in his father and mother; Diabetes in his sister. Social History   reports that he quit smoking about 5 years ago. His smoking use included cigarettes. He has a 45.00 pack-year smoking history. He has never used smokeless tobacco.   reports that he does not currently use alcohol. reports that he does not currently use drugs after having used the following drugs: Cocaine. Frequency: 1.00 time per week.   Marital Status single  Occupation retired    Review of Systems:  CONSTITUTIONAL:   negative for fevers, chills, fatigue and malaise    EYES:   negative for double vision, blurred vision and photophobia    HEENT:   negative for tinnitus, epistaxis and sore throat     RESPIRATORY:   negative for cough, shortness of breath, wheezing     CARDIOVASCULAR:   negative for chest pain, palpitations, syncope, edema     GASTROINTESTINAL:   See HPI   GENITOURINARY:   negative for incontinence     MUSCULOSKELETAL:   negative for neck or back pain     NEUROLOGICAL:   Negative for weakness and tingling  negative for headaches and dizziness     PSYCHIATRIC:   negative for anxiety         OBJECTIVE:   VITALS:  height is 5' 10\" (1.778 m) and weight is 190 lb 7.6 oz (86.4 kg). His temporal temperature is 97.5 °F (36.4 °C). His blood pressure is 148/87 (abnormal) and his pulse is 83. His respiration is 18 and oxygen saturation is 98%. CONSTITUTIONAL:alert & cooperative, no acute distress. Very pleasant and talkative. SKIN:  Warm and dry, no rashes on exposed areas of skin. HEAD:  Normocephalic, atraumatic. EYES: wearing glasses. EOMs intact. EARS:  Hearing loss noted. NOSE:  Nares patent. No rhinorrhea. MOUTH/THROAT:  benign  NECK:good ROM   LUNGS: Clear to auscultation bilaterally, no wheezes, but with overall decreased breath sounds. CARDIOVASCULAR: Heart sounds are normal.  Regular rate and rhythm without murmur. ABDOMEN: firm, distended, ascites noted  EXTREMITIES: no edema bilateral lower extremities. IMPRESSIONS:   Portal hypertension   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 ([address] Alcoholic cirrhosis of liver with ascites ([address] Anemia (04/13/2022), Anxiety, Arthritis, Back pain, chronic, Lezama esophagus, BPH (benign prostatic hypertrophy), Cholelithiasis, Cirrhosis ([address] COVID-19 (12/2020), COVID-19 vaccine series completed (5/20/2021, 6/22/2021), DDD (degenerative disc disease), lumbar, Depression, Esophageal cancer ([address] Esophageal varices ([address] Fatty liver, GERD (gastroesophageal reflux disease), Hep C w/o coma, chronic ([address] History of alcohol abuse, History of blood transfusion, History of colon polyps (2016), History of tobacco abuse, [place] (hard of hearing), Hyperlipidemia, Hypertension, Hyponatremia (07/20/2016), Port-A-Cath in place, Portal hypertension ([address] Sciatica, Secondary esophageal varices ([address] (06/07/2022), Shortness of breath, Spinal stenosis, Stomach ulcer, Tubular adenoma of colon (2016, 2018), Vitamin D deficiency, and Wears glasses.    PLANS:   TIPS procedure    Nickey Kayser, PA-C  Electronically signed 12/1/2022 at 10:47 AM

## 2022-12-01 NOTE — PROGRESS NOTES
Pt arrives to IR for TIPS procedure with para   JR LEONE and MC RT to bedside  Site prepped and draped  Access obtained and draining clear yellow fluid  Albumin ordered and hung during procedure per anesthesia    6400ml removed  Access removed and site covered with dsd  Tolerated well  Continue with TIPS procedure

## 2022-12-01 NOTE — ANESTHESIA PRE PROCEDURE
Department of Anesthesiology  Preprocedure Note       Name:  Baljeet Kumar   Age:  60 y.o.  :  1960                                          MRN:  [id number]         Date:  2022      Surgeon: * Surgery not found *    Procedure:     Medications prior to admission:   Prior to Admission medications    Medication Sig Start Date End Date Taking? Authorizing Provider   FEROSUL 325 (65 Fe) MG tablet take 1 tablet by mouth twice a day 10/7/22   VASU Mcclain   nadolol (CORGARD) 20 MG tablet take 1 tablet by mouth once daily 22   Yassine Charter, APRN - NP   midodrine (PROAMATINE) 5 MG tablet Take 2 tablets by mouth in the morning and 2 tablets at noon and 2 tablets before bedtime. 22   Yassine Charter, APRN - NP   furosemide (LASIX) 20 MG tablet Take 1 tablet by mouth 2 times daily 22   Yassine Charter, APRN - NP   spironolactone (ALDACTONE) 100 MG tablet Take 1 tablet by mouth every evening 22   Yassine Chartzeferino, APRN - NP   ondansetron (ZOFRAN-ODT) 4 MG disintegrating tablet dissolve 1 tablet by mouth every 8 hours if needed for nausea and vomiting 22   VASU Mcclain   lactulose [facility]) 10 GM/15ML solution take 30 milliliters by mouth three times a day 22   VASU Mcclain   FLUoxetine (PROZAC) 20 MG capsule Take 1 capsule by mouth daily 22   Hallie Fisher MD   atorvastatin (LIPITOR) 20 MG tablet Take 1 tablet by mouth nightly 22   Hallie Fisher MD       Current medications:    Current Outpatient Medications   Medication Sig Dispense Refill    FEROSUL 325 (65 Fe) MG tablet take 1 tablet by mouth twice a day 60 tablet 5    nadolol (CORGARD) 20 MG tablet take 1 tablet by mouth once daily 30 tablet 2    midodrine (PROAMATINE) 5 MG tablet Take 2 tablets by mouth in the morning and 2 tablets at noon and 2 tablets before bedtime.  90 tablet 3    furosemide (LASIX) 20 MG tablet Take 1 tablet by mouth 2 times daily 60 tablet 3    spironolactone (ALDACTONE) 100 MG tablet Take 1 tablet by mouth every evening 30 tablet 1    ondansetron (ZOFRAN-ODT) 4 MG disintegrating tablet dissolve 1 tablet by mouth every 8 hours if needed for nausea and vomiting 10 tablet 0    lactulose (CHRONULAC) 10 GM/15ML solution take 30 milliliters by mouth three times a day 473 mL 1    FLUoxetine (PROZAC) 20 MG capsule Take 1 capsule by mouth daily 30 capsule 3    atorvastatin (LIPITOR) 20 MG tablet Take 1 tablet by mouth nightly 30 tablet 3     No current facility-administered medications for this encounter. Allergies:  No Known Allergies    Problem List:    Patient Active Problem List   Diagnosis Code    Back pain, chronic M54.9, G89.29    Hearing difficulty H91.90    GERD (gastroesophageal reflux disease) K21.9    Cervical radicular pain M54.12    Alcohol withdrawal syndrome without complication (HCC) K88.445    Hypomagnesemia E83.42    Chronic viral hepatitis B without delta agent and without coma (HCC) B18.1    Calculus of gallbladder without cholecystitis K80.20    Hep C w/o coma, chronic (HCC) B18.2    Fatty liver K76.0    Psychophysiologic insomnia F51.04    Cirrhosis (HCC) K74.60    Hepatic cirrhosis (HCC) K74.60    Vertebrogenic low back pain M54.51    DDD (degenerative disc disease), lumbar M51.36    Depression F32. A    Tubular adenoma of colon D12.6    History of colon polyps Z86.010    Gynecomastia, male N58    Lumbar radiculitis M54.16    Lumbar disc herniation M51.26    Tinnitus H93.19    Eustachian tube dysfunction H69.80    Ganglion cyst M67.40    Carpal tunnel syndrome of right wrist G56.01    History of hepatitis C Z86.19    Vitamin D deficiency E55.9    Pure hypercholesterolemia E78.00    Hypokalemia E87.6    Essential hypertension I10    Recurrent major depressive disorder in partial remission (HCC) F33.41    S/P epidural steroid injection Z92.241    Elevated LFTs R79.89    Seasonal allergies J30.2    Lumbar facet arthropathy M47.816    Cervical facet syndrome M47.812    Acute gastrointestinal bleeding K92.2    Thrombocytopenia (HCC) D69.6    Hepatitis C virus infection resolved after antiviral drug therapy Z86.19    Gastrointestinal hemorrhage with melena K92.1    Alcohol abuse F10.10    Altered mental status R41.82    Hypocalcemia E83.51    Hypophosphatemia E83.39    Malignant neoplasm of lower third of esophagus (HCC) C15.5    COVID-19 U07.1    Anxiety F41.9    Current smoker F17.200    Severe comorbid illness R69    Gait instability R26.81    Abnormal findings on diagnostic imaging of spine R93.7    Cervical spinal stenosis M48.02    Spinal stenosis of lumbar region with neurogenic claudication M48.062    Low hemoglobin D64.9    Symptomatic anemia, microcytic, acute D64.9    Hypotension I95.9    Former smoker, 50+ pack years, quit 2016 Z87.891    HLD (hyperlipidemia) E78.5    Esophageal adenocarcinoma (HCC) C15.9    Anemia D64.9    Acute kidney injury ([address] N17.9    Ascites R18.8    Shortness of breath R06.02    Drop in hemoglobin R71.0    Portal hypertension (HCC) K76.6    Secondary esophageal varices (HCC) I85.10    Esophageal polyp K22.81    Acute kidney failure, unspecified (HCC) N17.9    Muscle weakness (generalized) M62.81    Other abnormalities of gait and mobility R26.89    GI bleed K92.2    Goals of care, counseling/discussion Z71.89    DNR (do not resuscitate) discussion Z70.80    ACP (advance care planning) Z71.89    Palliative care encounter Z51.5       Past Medical History:        Diagnosis Date    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 ([address]     Alcoholic cirrhosis of liver with ascites ([address]     Anemia 04/13/2022    Anxiety     Arthritis     Back pain, chronic     dr. Keyla Juarez, orthopedic, every 3-4 months, gets steroid injection    Lezmaa esophagus     BPH (benign prostatic hypertrophy)     Cholelithiasis     Cirrhosis ([address]     COVID-19 12/2020    pt reports he had a positive test while at [facility] in 2020, was asymptomatic    COVID-19 vaccine series completed 5/20/2021, 6/22/2021    Moderna 5/20/2021, 6/22/2021    DDD (degenerative disc disease), lumbar     Depression     Esophageal cancer (HCC)     INVASIVE ADENOCARCINOMA ARISING IN TUBULAR ADENOMA WITH HIGH GRADE DYSPLASIA, ASSOCIATED WITH FOCAL INTESTINAL METAPLASIA     Esophageal varices ([address]     Fatty liver     GERD (gastroesophageal reflux disease)     Hep C w/o coma, chronic (HCC)     History of alcohol abuse     6-12 beers a day; quit drinking 2019    History of blood transfusion     History of colon polyps 2016    History of tobacco abuse     [place] (hard of hearing)     Hyperlipidemia     Hypertension     Hyponatremia 07/20/2016    Port-A-Cath in place     right upper chest    Portal hypertension ([address]     Sciatica     Secondary esophageal varices ([address] 06/07/2022    Shortness of breath     Spinal stenosis     Stomach ulcer     hx of    Tubular adenoma of colon 2016, 2018    Vitamin D deficiency     Wears glasses        Past Surgical History:        Procedure Laterality Date    BUNIONECTOMY      twice on right side    BUNIONECTOMY Left     CARPAL TUNNEL RELEASE Right     COLONOSCOPY      at age 35    COLONOSCOPY  10/05/2016    polyps-pathology tubular adenoma, and abnormal looking mucosa right colon-pathology-tubular adenoma    COLONOSCOPY N/A 03/30/2018    COLONOSCOPY POLYPECTOMY COLD BIOPSY performed by Hemant Capellan MD at [place] [address]  03/30/2018    Small polyp in the sigmoid colon and excised with biopsy forceps--tubular adenoma    COLONOSCOPY N/A 04/16/2022    COLONOSCOPY POLYPECTOMY performed by Hemant Capellan MD at [address] [place] [address], [place], DIAGNOSTIC      EGD    IR PORT PLACEMENT EQUAL OR GREATER THAN 5 YEARS  04/19/2021    IR PORT PLACEMENT EQUAL OR GREATER THAN 5 YEARS 4/19/2021 [facility] PROCED[facility]    KNEE SURGERY Left     cyst removed    NASAL SEPTUM SURGERY      NERVE BLOCK Right 11/23/2020    NERVE BLOCK RIGHT CERVICAL STEROID INJECTION  C3-C6 performed by Osbaldo Ambrocio MD at [address]  01/04/2016    steroid injection C7 T1    OTHER SURGICAL HISTORY  11/21/2016    Bilateral Lumbar CACHORRO L4-L5 injections    OTHER SURGICAL HISTORY  12/19/2016    lumbar steroid injection    OTHER SURGICAL HISTORY  09/28/2018    BILATERAL L5 CACHORRO (N/A Back)    OTHER SURGICAL HISTORY Right 11/23/2020    cervical injection    PAIN MANAGEMENT PROCEDURE Left 07/09/2020    EPIDURAL STEROID INJECTION LEFT L4 L5 performed by Osbaldo Ambrocio MD at [facility] Left 07/20/2020    LEFT L4 L5 EPIDURAL STEROID INJECTION performed by Osbaldo Ambrocio MD at [facility] Bilateral 08/17/2020    LUMBAR FACET BILATERAL L2-L5 performed by Osbaldo Ambrocio MD at [facility] Bilateral 12/07/2020    NERVE BLOCK BILATERAL LUMBAR MEDIAL BRANCH L2-L5 performed by Osbaldo Ambrocio MD at [address]      Multiple    [place] [address] AA&/STRD TFRML EPI LUMBAR/SACRAL 1 LEVEL Bilateral 09/06/2018    BILATERAL L5 CACHORRO performed by Osbaldo Ambrocio MD at [address] AA&/STRD TFRML EPI LUMBAR/SACRAL 1 LEVEL N/A 09/28/2018    BILATERAL L5 CACHORRO performed by Osbaldo Ambrocio MD at [facility] N/CARPAL TUNNEL SURG Right 08/29/2017    CARPAL TUNNEL RELEASE RIGHT performed by Lauren Horta MD at [facility] N/CARPAL TUNNEL SURG Left 10/31/2017    CARPAL TUNNEL RELEASE performed by Lauren Horta MD at [facility] 12/29/2020    EGD BIOPSY performed by Zechariah Mata MD at [facility] 02/02/2021    EGD BIOPSY and spot marking performed by Johann Sanchez MD at [facility] 02/12/2021    ENDOSCOPIC ULTRASOUND, EGD performed by Rama Basilio MD at [facility]    UPPER GASTROINTESTINAL ENDOSCOPY  2021    EGD DIAGNOSTIC ONLY performed by Nilson Joseph MD at [place] [address] N/A 2021    EGD BIOPSY performed by Casey Adkins MD at [address] [place] [address] 2022    EGD BIOPSY performed by Casey Adkins MD at [address] [place] [address] N/A 04/15/2022    EGD ESOPHAGOGASTRODUODENOSCOPY performed by Anita Anguiano MD at [address] [place] [address] 2022    EGD BAND LIGATION performed by Quyen Montelongo MD at [address] [place] [address] N/A 2022    EGD ESOPHAGOGASTRODUODENOSCOPY performed by Quyen Montelongo MD at [address] [place] [address] N/A 2022    EGD ESOPHAGOGASTRODUODENOSCOPY ENDOSCOPIC APC AT [facility] performed by Christine Roman MD at [address] [place] [address] 10/19/2022    EGD BIOPSY performed by Casey Adkins MD at [address] [place] [address] N/A 10/31/2022    EGD with APC performed by Casey Adkins MD at [facility] History:    Social History     Tobacco Use    Smoking status: Former     Packs/day: 1.00     Years: 45.00     Pack years: 45.00     Types: Cigarettes     Quit date: 2017     Years since quittin.8    Smokeless tobacco: Never   Substance Use Topics    Alcohol use: Not Currently     Comment: Quit alcohol in 2019- heavier drinking prior to quitting                                Counseling given: Not Answered      Vital Signs (Current): There were no vitals filed for this visit.                                            BP Readings from Last 3 Encounters:   22 111/65   22 105/68   11/15/22 126/70       NPO Status: Time of last liquid consumption: 2200                        Time of last solid consumption: 2100 BMI:   Wt Readings from Last 3 Encounters:   11/11/22 209 lb 9.6 oz (95.1 kg)   11/08/22 202 lb 4.8 oz (91.8 kg)   11/07/22 200 lb (90.7 kg)     There is no height or weight on file to calculate BMI.    CBC:   Lab Results   Component Value Date/Time    WBC 5.9 11/29/2022 02:17 PM    RBC 3.53 11/29/2022 02:17 PM    RBC 4.75 04/19/2012 11:30 AM    HGB 9.0 11/29/2022 02:17 PM    HCT 28.9 11/29/2022 02:17 PM    MCV 82.1 11/29/2022 02:17 PM    RDW 17.3 11/29/2022 02:17 PM     11/29/2022 02:17 PM     04/19/2012 11:30 AM       CMP:   Lab Results   Component Value Date/Time     11/29/2022 02:18 PM    K 4.2 11/29/2022 02:18 PM     11/29/2022 02:18 PM    CO2 22 11/29/2022 02:18 PM    BUN 7 11/29/2022 02:18 PM    CREATININE 0.70 11/29/2022 02:18 PM    GFRAA >60 09/28/2022 01:33 PM    LABGLOM >60 11/29/2022 02:18 PM    GLUCOSE 126 11/29/2022 02:18 PM    GLUCOSE 65 04/19/2012 11:30 AM    PROT 6.1 11/29/2022 02:18 PM    CALCIUM 8.8 11/29/2022 02:18 PM    BILITOT 1.2 11/29/2022 02:18 PM    ALKPHOS 127 11/29/2022 02:18 PM    AST 36 11/29/2022 02:18 PM    ALT 13 11/29/2022 02:18 PM       POC Tests: No results for input(s): POCGLU, POCNA, POCK, POCCL, POCBUN, POCHEMO, POCHCT in the last 72 hours.     Coags:   Lab Results   Component Value Date/Time    PROTIME 11.9 11/15/2022 02:27 PM    INR 1.1 11/15/2022 02:27 PM    APTT 25.6 11/15/2022 02:27 PM       HCG (If Applicable): No results found for: PREGTESTUR, PREGSERUM, HCG, HCGQUANT     ABGs: No results found for: PHART, PO2ART, UQQ9SUP, XKL5PPD, BEART, O2XJMROJ     Type & Screen (If Applicable):  No results found for: LABABO, LABRH    Drug/Infectious Status (If Applicable):  Lab Results   Component Value Date/Time    HEPCAB REACTIVE 12/23/2020 05:09 PM       COVID-19 Screening (If Applicable):   Lab Results   Component Value Date/Time    COVID19 Not Detected 09/12/2022 10:32 PM    COVID19 Not Detected 07/17/2020 10:00 AM Anesthesia Evaluation  Patient summary reviewed no history of anesthetic complications:   Airway: Mallampati: II  TM distance: >3 FB   Neck ROM: full  Mouth opening: > = 3 FB   Dental:          Pulmonary:normal exam    (+) shortness of breath: no interval change,                             Cardiovascular:    (+) hypertension: no interval change,       ECG reviewed  Rhythm: regular  Rate: normal                    Neuro/Psych:   (+) neuromuscular disease:, psychiatric history:depression/anxiety             GI/Hepatic/Renal:   (+) GERD: no interval change, PUD, hepatitis: C, liver disease: portal hypertension,           Endo/Other: Negative Endo/Other ROS   (+) blood dyscrasia: anemia:., .                 Abdominal:             Vascular: negative vascular ROS. Other Findings:           Anesthesia Plan      MAC and general     ASA 3       Induction: intravenous. Anesthetic plan and risks discussed with patient. Plan discussed with CRNA.                     Kyaw Willingham MD   12/1/2022

## 2022-12-01 NOTE — PROGRESS NOTES
RN called and spoke to IR as pt believes he is being admitted and he doesn't have anyone to stay at home with him tonight. IR attending stated that pt will be admitted tonight.

## 2022-12-01 NOTE — BRIEF OP NOTE
Brief Postoperative Note for Paracentesis    Nneka Lerma  YOB: 1960  [id number]    Pre-operative Diagnosis:  Cirrhosis; Ascites     Post-operative Diagnosis: Same    Procedure: Ultrasound guided Paracentesis; TIPS insertion    Anesthesia: 1% Lidocaine     Surgeons/Assistants: Ria Odom PA-C and Freya Brewer    Complications: none    EBL: Minimal    Specimens: Were obtained    Ultrasound guided paracentesis performed. 6800 ml clear yellow fluid obtained. Dressing applied. Successful TIPS insertion.      Electronically signed by VASU Setrn on 12/1/2022 at 1:27 PM

## 2022-12-01 NOTE — ANESTHESIA POSTPROCEDURE EVALUATION
Department of Anesthesiology  Postprocedure Note    Patient: Padmini Gilbert  MRN: [id number]  [place]: 1960  Date of evaluation: 12/1/2022      Procedure Summary     Date: 12/01/22 Room / Location: [facility] Special Procedures    Anesthesia Start: 0626 Anesthesia Stop: 5812    Procedure: IR TIPS INSERTION Diagnosis:       Portal hypertension ([address]      Hepatic cirrhosis, unspecified hepatic cirrhosis type, unspecified whether ascites present ([address]      Other ascites      Cirrhosis ([facility]    Scheduled Providers: St[facility] Interventional Radiologist Responsible Provider: Sherrie Bunch MD    Anesthesia Type: MAC, general ASA Status: 3          Anesthesia Type: No value filed.     Yen Phase I:      Yen Phase II:        Anesthesia Post Evaluation    Patient location during evaluation: bedside  Patient participation: complete - patient participated  Level of consciousness: awake  Airway patency: patent  Nausea & Vomiting: no nausea and no vomiting  Complications: no  Cardiovascular status: blood pressure returned to baseline  Respiratory status: acceptable  Hydration status: euvolemic  Comments: /65   Pulse 87   Temp 97 °F (36.1 °C) (Temporal)   Resp 22   Ht 5' 10\" (1.778 m)   Wt 190 lb 7.6 oz (86.4 kg)   SpO2 100%   BMI 27.33 kg/m²

## 2022-12-02 VITALS
DIASTOLIC BLOOD PRESSURE: 52 MMHG | TEMPERATURE: 98.5 F | HEIGHT: 70 IN | HEART RATE: 65 BPM | OXYGEN SATURATION: 94 % | SYSTOLIC BLOOD PRESSURE: 84 MMHG | BODY MASS INDEX: 26.73 KG/M2 | WEIGHT: 186.73 LBS | RESPIRATION RATE: 12 BRPM

## 2022-12-02 LAB
AMMONIA: 56 UMOL/L (ref 16–60)
ANION GAP SERPL CALCULATED.3IONS-SCNC: 8 MMOL/L (ref 9–17)
BUN BLDV-MCNC: 9 MG/DL (ref 8–23)
CALCIUM SERPL-MCNC: 8.8 MG/DL (ref 8.6–10.4)
CHLORIDE BLD-SCNC: 96 MMOL/L (ref 98–107)
CO2: 23 MMOL/L (ref 20–31)
CREAT SERPL-MCNC: 0.5 MG/DL (ref 0.7–1.2)
GFR SERPL CREATININE-BSD FRML MDRD: >60 ML/MIN/1.73M2
GLUCOSE BLD-MCNC: 90 MG/DL (ref 70–99)
HCT VFR BLD CALC: 27.7 % (ref 40.7–50.3)
HEMOGLOBIN: 7.8 G/DL (ref 13–17)
INR BLD: 1.4
MCH RBC QN AUTO: 24.9 PG (ref 25.2–33.5)
MCHC RBC AUTO-ENTMCNC: 28.2 G/DL (ref 28.4–34.8)
MCV RBC AUTO: 88.5 FL (ref 82.6–102.9)
NRBC AUTOMATED: 0 PER 100 WBC
PDW BLD-RTO: 15.9 % (ref 11.8–14.4)
PLATELET # BLD: 135 K/UL (ref 138–453)
PMV BLD AUTO: 9.6 FL (ref 8.1–13.5)
POTASSIUM SERPL-SCNC: 4.2 MMOL/L (ref 3.7–5.3)
PROTHROMBIN TIME: 14.5 SEC (ref 9.1–12.3)
RBC # BLD: 3.13 M/UL (ref 4.21–5.77)
SODIUM BLD-SCNC: 127 MMOL/L (ref 135–144)
WBC # BLD: 8.7 K/UL (ref 3.5–11.3)

## 2022-12-02 PROCEDURE — 80048 BASIC METABOLIC PNL TOTAL CA: CPT

## 2022-12-02 PROCEDURE — 85610 PROTHROMBIN TIME: CPT

## 2022-12-02 PROCEDURE — 6370000000 HC RX 637 (ALT 250 FOR IP): Performed by: INTERNAL MEDICINE

## 2022-12-02 PROCEDURE — 36415 COLL VENOUS BLD VENIPUNCTURE: CPT

## 2022-12-02 PROCEDURE — 2580000003 HC RX 258: Performed by: INTERNAL MEDICINE

## 2022-12-02 PROCEDURE — 85027 COMPLETE CBC AUTOMATED: CPT

## 2022-12-02 PROCEDURE — 6360000002 HC RX W HCPCS: Performed by: INTERNAL MEDICINE

## 2022-12-02 PROCEDURE — 82140 ASSAY OF AMMONIA: CPT

## 2022-12-02 PROCEDURE — 96372 THER/PROPH/DIAG INJ SC/IM: CPT

## 2022-12-02 PROCEDURE — G0378 HOSPITAL OBSERVATION PER HR: HCPCS

## 2022-12-02 PROCEDURE — 99217 PR OBSERVATION CARE DISCHARGE MANAGEMENT: CPT | Performed by: INTERNAL MEDICINE

## 2022-12-02 RX ORDER — HEPARIN SODIUM (PORCINE) LOCK FLUSH IV SOLN 100 UNIT/ML 100 UNIT/ML
500 SOLUTION INTRAVENOUS PRN
Status: DISCONTINUED | OUTPATIENT
Start: 2022-12-02 | End: 2022-12-02 | Stop reason: HOSPADM

## 2022-12-02 RX ORDER — DIPHENHYDRAMINE HCL 25 MG
25 TABLET ORAL EVERY 6 HOURS PRN
Status: DISCONTINUED | OUTPATIENT
Start: 2022-12-02 | End: 2022-12-02 | Stop reason: HOSPADM

## 2022-12-02 RX ORDER — DIPHENHYDRAMINE HCL 25 MG
25 TABLET ORAL EVERY 6 HOURS PRN
COMMUNITY
Start: 2022-12-02 | End: 2023-01-01

## 2022-12-02 RX ADMIN — SODIUM CHLORIDE, PRESERVATIVE FREE 10 ML: 5 INJECTION INTRAVENOUS at 09:37

## 2022-12-02 RX ADMIN — ONDANSETRON 4 MG: 4 TABLET, ORALLY DISINTEGRATING ORAL at 09:31

## 2022-12-02 RX ADMIN — PANTOPRAZOLE SODIUM 40 MG: 40 TABLET, DELAYED RELEASE ORAL at 09:32

## 2022-12-02 RX ADMIN — FUROSEMIDE 20 MG: 20 TABLET ORAL at 09:33

## 2022-12-02 RX ADMIN — ENOXAPARIN SODIUM 40 MG: 100 INJECTION SUBCUTANEOUS at 09:33

## 2022-12-02 RX ADMIN — FERROUS SULFATE TAB EC 325 MG (65 MG FE EQUIVALENT) 325 MG: 325 (65 FE) TABLET DELAYED RESPONSE at 09:33

## 2022-12-02 RX ADMIN — NADOLOL 20 MG: 20 TABLET ORAL at 09:32

## 2022-12-02 RX ADMIN — MIDODRINE HYDROCHLORIDE 10 MG: 5 TABLET ORAL at 13:14

## 2022-12-02 RX ADMIN — MIDODRINE HYDROCHLORIDE 10 MG: 5 TABLET ORAL at 09:32

## 2022-12-02 RX ADMIN — HEPARIN 500 UNITS: 100 SYRINGE at 16:59

## 2022-12-02 RX ADMIN — FLUOXETINE HYDROCHLORIDE 20 MG: 20 CAPSULE ORAL at 09:32

## 2022-12-02 RX ADMIN — DIPHENHYDRAMINE HCL 25 MG: 25 TABLET ORAL at 09:33

## 2022-12-02 ASSESSMENT — PAIN DESCRIPTION - ORIENTATION: ORIENTATION: RIGHT;LOWER

## 2022-12-02 ASSESSMENT — PAIN DESCRIPTION - FREQUENCY: FREQUENCY: CONTINUOUS

## 2022-12-02 ASSESSMENT — PAIN DESCRIPTION - LOCATION: LOCATION: ABDOMEN

## 2022-12-02 ASSESSMENT — PAIN - FUNCTIONAL ASSESSMENT: PAIN_FUNCTIONAL_ASSESSMENT: ACTIVITIES ARE NOT PREVENTED

## 2022-12-02 ASSESSMENT — PAIN SCALES - GENERAL: PAINLEVEL_OUTOF10: 5

## 2022-12-02 ASSESSMENT — PAIN DESCRIPTION - PAIN TYPE: TYPE: ACUTE PAIN

## 2022-12-02 ASSESSMENT — PAIN DESCRIPTION - ONSET: ONSET: ON-GOING

## 2022-12-02 ASSESSMENT — PAIN DESCRIPTION - DESCRIPTORS: DESCRIPTORS: ACHING

## 2022-12-02 NOTE — DISCHARGE SUMMARY
[facility]  Office: [address], DO, Archana Junior DO, Guilherme Agarwal, DO, Williams Sanchez, DO, Ina Connors MD, Roberto De Luna MD, Christi Henry MD, Adeline Adame MD,  Briseyda Kim MD, Roberta Kimbrough MD, Sabrina Ramirez DO, Feroz Ellswroth MD,  Judith Lazo MD, Edison Griffin MD, Tamara Boyd DO, Stephanie Arevalo MD, Quinn Arguelles MD, Josue Giles DO, Briseyda Ramos MD, Manish Ferguson MD, Romi Penn MD, Shelia Vazquez MD, Meredith Lai DO, Paula Ivy MD, Laura Gregorio MD, Betty Chinchilla CNP,  Matheus Biggs CNP, Ghislaine Gan, CNP, Rafaela Tavares, CNP,  Bernardo Lennon, [facility], Gilda Denis, CNP, Jorje Najera, CNP, John Cano, CNP, Agnieszka Lassiter CNP, Chris Gross, CNP, Quin Hitchcock PA-C, Jeromy Edmonds, CNS, Maximiliano Hannah, CNP, Joao Cleveland, [facility]    Discharge Summary     Patient ID: Mary Colon  :     MRN: [id number]     ACCOUNT:  [de-identified]   Patient's PCP: VASU Esteves  Admit Date: 2022   Discharge Date: 2022     Length of Stay: 1  Code Status:  Full Code  Admitting Physician: Nicola Sanchez DO  Discharge Physician: Nicola Sanchez DO     Active Discharge Diagnoses:     Hospital Problem Lists:  Principal Problem:    S/P TIPS (transjugular intrahepatic portosystemic shunt)  Active Problems:    Portal hypertension ([address]    Hepatic cirrhosis [facility])    Essential hypertension  Resolved Problems:    * No resolved hospital problems. *      Admission Condition:  fair     Discharged Condition: stable    Hospital Stay:     Hospital Course:  Mary Colon is a 60 y.o. male who was admitted for the management of   S/P TIPS (transjugular intrahepatic portosystemic shunt) , presented to ER with No chief complaint on file. Admitted for observation after TIPS placed by IR. He also had routine paracentesis at same time. Observed overnight and did well, ammonia level stable and no issues with severe hypotension or bleeding so discharged home in stable condition    Significant therapeutic interventions: see above    Significant Diagnostic Studies:   Labs / Micro:  CBC:   Lab Results   Component Value Date/Time    WBC 8.7 12/02/2022 06:59 AM    RBC 3.13 12/02/2022 06:59 AM    RBC 4.75 04/19/2012 11:30 AM    HGB 7.8 12/02/2022 06:59 AM    HCT 27.7 12/02/2022 06:59 AM    MCV 88.5 12/02/2022 06:59 AM    MCH 24.9 12/02/2022 06:59 AM    MCHC 28.2 12/02/2022 06:59 AM    RDW 15.9 12/02/2022 06:59 AM     12/02/2022 06:59 AM     04/19/2012 11:30 AM     CMP:    Lab Results   Component Value Date/Time    GLUCOSE 90 12/02/2022 06:59 AM    GLUCOSE 65 04/19/2012 11:30 AM     12/02/2022 06:59 AM    K 4.2 12/02/2022 06:59 AM    CL 96 12/02/2022 06:59 AM    CO2 23 12/02/2022 06:59 AM    BUN 9 12/02/2022 06:59 AM    CREATININE 0.50 12/02/2022 06:59 AM    ANIONGAP 8 12/02/2022 06:59 AM    ALKPHOS 114 12/01/2022 11:00 AM    ALT 11 12/01/2022 11:00 AM    AST 30 12/01/2022 11:00 AM    BILITOT 0.9 12/01/2022 11:00 AM    LABALBU 3.2 12/01/2022 11:00 AM    LABALBU 4.7 04/19/2012 11:30 AM    ALBUMIN 1.2 12/01/2022 11:00 AM    LABGLOM >60 12/02/2022 06:59 AM    GFRAA >60 09/28/2022 01:33 PM    GFR      09/28/2022 01:33 PM    PROT 5.9 12/01/2022 11:00 AM    CALCIUM 8.8 12/02/2022 06:59 AM        Radiology:  IR TIPS INSERTION    Result Date: 12/1/2022  Successful placement of tips shunt 5 cm via tore graft from right hepatic vein to left portal vein with good flow. Consultations:    Consults:     Final Specialist Recommendations/Findings:   None      The patient was seen and examined on day of discharge and this discharge summary is in conjunction with any daily progress note from day of discharge.     Discharge plan:     Disposition: Home    Physician Follow Up:     Liza Dunlap, [address] [place] [address]  [phone]    Follow up in 1 week(s)      MASSIMO Hughes NP  [address]  [address] [facility] [address]  [phone]    Follow up on 12/8/2022      Danii Bush MD  [address] [place] [address]  [phone]    Follow up on 12/6/2022         Requiring Further Evaluation/Follow Up POST HOSPITALIZATION/Incidental Findings: post tips care    Diet: regular diet    Activity: As tolerated    Instructions to Patient: take medications as prescribed      Discharge Medications:      Medication List        START taking these medications      diphenhydrAMINE 25 MG tablet  Commonly known as: BENADRYL  Take 1 tablet by mouth every 6 hours as needed for Itching            CONTINUE taking these medications      atorvastatin 20 MG tablet  Commonly known as: LIPITOR  Take 1 tablet by mouth nightly     FeroSul 325 (65 Fe) MG tablet  Generic drug: ferrous sulfate  take 1 tablet by mouth twice a day     FLUoxetine 20 MG capsule  Commonly known as: PROZAC  Take 1 capsule by mouth daily     furosemide 20 MG tablet  Commonly known as: Lasix  Take 1 tablet by mouth 2 times daily     lactulose 10 GM/15ML solution  Commonly known as: CHRONULAC  take 30 milliliters by mouth three times a day     midodrine 5 MG tablet  Commonly known as: PROAMATINE  Take 2 tablets by mouth in the morning and 2 tablets at noon and 2 tablets before bedtime.      nadolol 20 MG tablet  Commonly known as: CORGARD  take 1 tablet by mouth once daily     ondansetron 4 MG disintegrating tablet  Commonly known as: ZOFRAN-ODT  dissolve 1 tablet by mouth every 8 hours if needed for nausea and vomiting     pantoprazole 40 MG tablet  Commonly known as: PROTONIX     spironolactone 100 MG tablet  Commonly known as: ALDACTONE  Take 1 tablet by mouth every evening               Where to Get Your Medications        You can get these medications from any pharmacy    You don't need a prescription for these medications  diphenhydrAMINE 25 MG tablet         No discharge procedures on file. Time Spent on discharge is  20 mins in patient examination, evaluation, counseling as well as medication reconciliation, prescriptions for required medications, discharge plan and follow up. Electronically signed by   Michelle Sanchez DO  12/2/2022  11:28 AM      Thank you VASU Bland for the opportunity to be involved in this patient's care.

## 2022-12-02 NOTE — PROGRESS NOTES
[facility]  Office: [address], DO, Heart Vivian, DO, Javier Colbert, DO, Ilthee Jose Blood, DO, Robert Babcock MD, Johann Riedel, MD, Sammy Vines MD, Talisha Mattson MD,  Marlyn Landry MD, Kirstin Linton MD, Dominguez Driver, DO, Mine Pace MD,  Stoney Bhatia MD, Cooper Bhandari MD, Luna Kelly, DO, Rodrigo Mathias MD, Cal Jeffrey MD, Mikel Odom, DO, Chuckie Lagunas MD, Kassi Reyez MD, Mannie Jacobsen MD, Chayito James MD, Patricia Bautista, DO, Karla Lund MD, Oly Vo MD, Emilie Washington, [facility],  Katarzyna Aj, CNP, Jenn Sharma, CNP, Bonilla Valdovinos, CNP,  Jailene Mukherjee, [facility], Roberto Moura, CNP, Curt Dhillon, CNP, Nneka Floyd, CNP, Zeus Holt, CNP, Tomi Funes, CNP, Juanjose Monique PA-C, Rodrigo Barber, CNS, Jeri Morillo, CNP, Kristian Coyle, CNP         [address]    Progress Note    12/2/2022    11:13 AM    Name:   Fred Rosenberg  MRN:     [id number]     Acct:      [de-identified]   Room:   2007/2007-01   Day:  0  Admit Date:  12/1/2022  3:10 PM    PCP:   VASU Doss  Code Status:  Full Code    Subjective:     C/C: s/p TIPS  Interval History Status: improved. Feels a lot better today compared to last night-had abd pain and nausea-now resolved    No cp/sob/f/c/s/s    Brief History:     Fred Rosenberg is a 60 y.o. Non- / non  male who presents with No chief complaint on file. and is admitted to the hospital for the management of S/P TIPS (transjugular intrahepatic portosystemic shunt). This 60 yom presents for admission after having paracentesis and TIPS done today for alcoholic cirrhosis.   IR wanted him admitted and observed overnight and if stable can be discharged in am.  His only complaint currently is abd pain    Review of Systems:     Constitutional:  negative for chills, fevers, sweats  Respiratory:  negative for cough, dyspnea on exertion, shortness of breath, wheezing  Cardiovascular:  negative for chest pain, chest pressure/discomfort, lower extremity edema, palpitations  Gastrointestinal:  negative for abdominal pain, constipation, diarrhea, nausea, vomiting  Neurological:  negative for dizziness, headache    Medications:      Allergies:  No Known Allergies    Current Meds:   Scheduled Meds:    FLUoxetine  20 mg Oral Daily    atorvastatin  20 mg Oral Nightly    lactulose  20 g Oral TID    furosemide  20 mg Oral BID    spironolactone  100 mg Oral QPM    midodrine  10 mg Oral TID    ferrous sulfate  325 mg Oral BID WC    pantoprazole  40 mg Oral QAM AC    sodium chloride flush  5-40 mL IntraVENous 2 times per day    enoxaparin  40 mg SubCUTAneous Daily    nadolol  20 mg Oral Daily     Continuous Infusions:    sodium chloride       PRN Meds: diphenhydrAMINE, ondansetron, sodium chloride flush, sodium chloride, potassium chloride **OR** potassium alternative oral replacement **OR** potassium chloride, magnesium sulfate, polyethylene glycol, acetaminophen **OR** acetaminophen, oxyCODONE, calcium carbonate    Data:     Past Medical History:   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 Alcoholic cirrhosis of liver with ascites ([facility] Anemia, Anxiety, Arthritis, Back pain, chronic, Lezama esophagus, BPH (benign prostatic hypertrophy), Cholelithiasis, Cirrhosis ([facility] COVID-19, COVID-19 vaccine series completed, DDD (degenerative disc disease), lumbar, Depression, Esophageal cancer ([facility] Esophageal varices ([facility] Fatty liver, GERD (gastroesophageal reflux disease), Hep C w/o coma, chronic ([facility] History of alcohol abuse, History of blood transfusion, History of colon polyps, History of tobacco abuse, [place] (hard of hearing), Hyperlipidemia, Hypertension, Hyponatremia, Port-A-Cath in place, Portal hypertension ([facility] Sciatica, Secondary esophageal varices ([facility] Shortness of breath, Spinal stenosis, Stomach ulcer, Tubular adenoma of colon, Vitamin D deficiency, and Wears glasses. Social History:   reports that he quit smoking about 5 years ago. His smoking use included cigarettes. He has a 45.00 pack-year smoking history. He has never used smokeless tobacco. He reports that he does not currently use alcohol. He reports that he does not currently use drugs after having used the following drugs: Cocaine. Frequency: 1.00 time per week. Family History:   Family History   Problem Relation Age of Onset    Cancer Mother         pancreatic    Cancer Father         bone    Diabetes Sister     Asthma Brother        Vitals:  BP (!) 104/58   Pulse 90   Temp 97.8 °F (36.6 °C) (Oral)   Resp 21   Ht 5' 10\" (1.778 m)   Wt 186 lb 11.7 oz (84.7 kg)   SpO2 96%   BMI 26.79 kg/m²   Temp (24hrs), Av °F (36.7 °C), Min:97 °F (36.1 °C), Max:98.9 °F (37.2 °C)    No results for input(s): POCGLU in the last 72 hours. I/O (24Hr):     Intake/Output Summary (Last 24 hours) at 2022 1113  Last data filed at 2022  Gross per 24 hour   Intake 230 ml   Output --   Net 230 ml       Labs:  Hematology:  Recent Labs     22  14122  1100 22  0659   WBC 5.9 6.4 8.7   RBC 3.53* 3.20* 3.13*   HGB 9.0* 8.0* 7.8*   HCT 28.9* 28.1* 27.7*   MCV 82.1 87.8 88.5   MCH 25.5* 25.0* 24.9*   MCHC 31.1 28.5 28.2*   RDW 17.3* 15.8* 15.9*    152 135*   MPV 7.0 9.8 9.6   INR  --  1.1 1.4     Chemistry:  Recent Labs     22  14122  1100 22  0659   * 129* 127*   K 4.2 4.1 4.2    96* 96*   CO2 22 19* 23   GLUCOSE 126* 100* 90   BUN 7* 8 9   CREATININE 0.70 0.46* 0.50*   ANIONGAP 10 14 8*   LABGLOM >60 >60 >60   CALCIUM 8.8 8.4* 8.8     Recent Labs     22  1417 22  1418 22  1100 22  0659   PROT 6.1* 6.1* 5.9*  --    LABALBU 3.3* 3.3* 3.2*  --    AST 36 36 30  --    ALT 13 13 11  --    ALKPHOS 127 127 114  --    BILITOT 1.2 1.2 0.9  --    AMMONIA  --   --   --  56     ABG:No results found for: POCPH, PHART, PH, POCPCO2, AXA9NAU, PCO2, POCPO2, PO2ART, PO2, POCHCO3, MTI7YEU, HCO3, NBEA, PBEA, BEART, BE, THGBART, THB, DNH5ZLX, KEDV2ELG, K8OWFATO, O2SAT, FIO2  Lab Results   Component Value Date/Time    SPECIAL NOT REPORTED 02/02/2022 11:28 AM     Lab Results   Component Value Date/Time    CULTURE NO SIGNIFICANT GROWTH 09/12/2022 11:00 PM       Radiology:  IR TIPS INSERTION    Result Date: 12/1/2022  Successful placement of tips shunt 5 cm via tore graft from right hepatic vein to left portal vein with good flow.        Physical Examination:        General appearance:  alert, cooperative and no distress  Mental Status:  oriented to person, place and time and normal affect  Lungs:  clear to auscultation bilaterally, normal effort  Heart:  regular rate and rhythm, no murmur  Abdomen:  soft, nontender, nondistended, normal bowel sounds, no masses, hepatomegaly, splenomegaly  Extremities:  no edema, redness, tenderness in the calves  Skin:  no gross lesions, rashes, induration    Assessment:        Hospital Problems             Last Modified POA    * (Principal) S/P TIPS (transjugular intrahepatic portosystemic shunt) 12/1/2022 Yes    Portal hypertension ([address] 12/1/2022 Yes    Hepatic cirrhosis ([address] (Chronic) 12/1/2022 Yes    Essential hypertension 12/1/2022 Yes       Plan:        Stable overnight and feels better  Dc home today    Radha Sanchez DO  12/2/2022  11:13 AM

## 2022-12-02 NOTE — DISCHARGE INSTR - COC
Continuity of Care Form    Patient Name: Sandy Alfaro   :    MRN:  [id number]    Admit date:  2022  Discharge date:  2022    Code Status Order: Full Code   Advance Directives:     Admitting Physician:  Mirella Sanchez DO  PCP: VASU Browning    Discharging Nurse: [facility] Unit/Room#:   Discharging Unit Phone Number: [phone]    Emergency Contact:   Extended Emergency Contact Information  Primary Emergency Contact: TahirSheela Howard Phone: [phone]  Work Phone: [phone]  Mobile Phone: [phone]  Relation: Other    Past Surgical History:  Past Surgical History:   Procedure Laterality Date    BUNIONECTOMY      twice on right side    BUNIONECTOMY Left     CARPAL TUNNEL RELEASE Right     COLONOSCOPY      at age 35    COLONOSCOPY  10/05/2016    polyps-pathology tubular adenoma, and abnormal looking mucosa right colon-pathology-tubular adenoma    COLONOSCOPY N/A 2018    COLONOSCOPY POLYPECTOMY COLD BIOPSY performed by Ryan Ybarra MD at [address] [place] [address]  2018    Small polyp in the sigmoid colon and excised with biopsy forceps--tubular adenoma    COLONOSCOPY N/A 2022    COLONOSCOPY POLYPECTOMY performed by Ryan Ybarra MD at [address], DIAGNOSTIC      EGD    IR [address] 5 YEARS  2021    IR PORT PLACEMENT EQUAL OR GREATER THAN 5 YEARS 2021 [address] [place] [address]    IR TIPS INSERTION  2022    IR TIPS INSERTION 2022 Adeline Arceo MD [facility] SPECIAL PROCEDURES    KNEE SURGERY Left     cyst removed    NASAL SEPTUM SURGERY      NERVE BLOCK Right 2020    NERVE BLOCK RIGHT CERVICAL STEROID INJECTION  C3-C6 performed by Gabino Stover MD at [place] [address]  2016    steroid injection C7 T1    OTHER SURGICAL HISTORY  2016    Bilateral Lumbar CACHORRO L4-L5 injections    OTHER SURGICAL HISTORY  2016    lumbar steroid injection    OTHER SURGICAL HISTORY  09/28/2018    BILATERAL L5 CACHORRO (N/A Back)    OTHER SURGICAL HISTORY Right 11/23/2020    cervical injection    PAIN MANAGEMENT PROCEDURE Left 07/09/2020    EPIDURAL STEROID INJECTION LEFT L4 L5 performed by Lucrecia Tracey MD at [facility] Left 07/20/2020    LEFT L4 L5 EPIDURAL STEROID INJECTION performed by Lucrecia Tracey MD at [facility] Bilateral 08/17/2020    LUMBAR FACET BILATERAL L2-L5 performed by Lucrecia Tracey MD at [facility] Bilateral 12/07/2020    NERVE BLOCK BILATERAL LUMBAR MEDIAL BRANCH L2-L5 performed by Lucrecia Tracey MD at [address] [place] [address]      [place]    [place] [address] AA&/STRD TFRML EPI LUMBAR/SACRAL 1 LEVEL Bilateral 09/06/2018    BILATERAL L5 CACHORRO performed by Lucrecia Tracey MD at [facility] AA&/STRD TFRML EPI LUMBAR/SACRAL 1 LEVEL N/A 09/28/2018    BILATERAL L5 CACHORRO performed by Lucrecia Tracey MD at [address] [place] [address] N/CARPAL TUNNEL SURG Right 08/29/2017    CARPAL TUNNEL RELEASE RIGHT performed by Nanette Herbert MD at [address] [place] [address] N/CARPAL TUNNEL SURG Left 10/31/2017    CARPAL TUNNEL RELEASE performed by Nanette Herbert MD at [address] [place] [address] 12/29/2020    EGD BIOPSY performed by Dea Shaw MD at [address] [place] [address] 02/02/2021    EGD BIOPSY and spot marking performed by Kristy Cooper MD at [address] [place] [address] N/A 02/12/2021    ENDOSCOPIC ULTRASOUND, EGD performed by Noemi Ortiz MD at [place] [address]  02/12/2021    EGD DIAGNOSTIC ONLY performed by Noemi Ortiz MD at [place] [address] 08/31/2021    EGD BIOPSY performed by Kristy Cooper MD at [address] [place] [address] 01/21/2022    EGD BIOPSY performed by Kristy Cooper MD at [place] [address] UPPER GASTROINTESTINAL ENDOSCOPY N/A 04/15/2022    EGD ESOPHAGOGASTRODUODENOSCOPY performed by Maria Victoria Ortiz MD at [address] [place] [address] 06/06/2022    EGD BAND LIGATION performed by Pau Tovar MD at [address] [place] [address] N/A 06/09/2022    EGD ESOPHAGOGASTRODUODENOSCOPY performed by Pau Tovar MD at [address] [place] [address] N/A 09/14/2022    EGD ESOPHAGOGASTRODUODENOSCOPY ENDOSCOPIC APC AT GE JUNCTION performed by Ever Urbina MD at [address] [place] [address] N/A 10/19/2022    EGD BIOPSY performed by Ezra Rajan MD at [address] [place] [address] N/A 10/31/2022    EGD with APC performed by Ezra Rajan MD at [facility] [address] EXTRACTION         Immunization History:   Immunization History   Administered Date(s) Administered    COVID-19, MODERNA BLUE border, Primary or Immunocompromised, (age 12y+), IM, 100 mcg/0.5mL 05/20/2021, 06/22/2021    Hepatitis A 09/20/2016, 03/29/2017    Hepatitis B (Recombivax HB) 09/20/2016, 10/19/2016, 03/29/2017    Influenza 11/29/2012    Influenza, AFLURIA (age 1 yrs+), FLUZONE, (age 10 mo+), MDV, 0.5mL 09/13/2017, 01/24/2019    Influenza, FLUARIX, FLULAVAL, [place] (age 10 mo+) AND AFLURIA, (age 1 y+), PF, 0.5mL 09/13/2016, 12/30/2020, 12/23/2021    PPD Test 07/25/2016, 04/23/2022    Pneumococcal Polysaccharide ([id number]) 11/29/2012, 07/25/2016    Tdap (Boostrix, Adacel) 04/06/2017, 06/15/2022       Active Problems:  Patient Active Problem List   Diagnosis Code    Back pain, chronic M54.9, G89.29    Hearing difficulty H91.90    GERD (gastroesophageal reflux disease) K21.9    Cervical radicular pain M54.12    Alcohol withdrawal syndrome without complication (HCC) W44.923    Hypomagnesemia E83.42    Chronic viral hepatitis B without delta agent and without coma (HCC) B18.1    Calculus of gallbladder without cholecystitis K80.20    Hep C w/o coma, chronic (HCC) B18.2    Fatty liver K76.0    Psychophysiologic insomnia F51.04    Cirrhosis (HCC) K74.60    Hepatic cirrhosis (HCC) K74.60    Vertebrogenic low back pain M54.51    DDD (degenerative disc disease), lumbar M51.36    Depression F32. A    Tubular adenoma of colon D12.6    History of colon polyps Z86.010    Gynecomastia, male N62    Lumbar radiculitis M54.16    Lumbar disc herniation M51.26    Tinnitus H93.19    Eustachian tube dysfunction H69.80    Ganglion cyst M67.40    Carpal tunnel syndrome of right wrist G56.01    History of hepatitis C Z86.19    Vitamin D deficiency E55.9    Pure hypercholesterolemia E78.00    Hypokalemia E87.6    Essential hypertension I10    Recurrent major depressive disorder in partial remission (HCC) F33.41    S/P epidural steroid injection Z92.241    Elevated LFTs R79.89    Seasonal allergies J30.2    Lumbar facet arthropathy M47.816    Cervical facet syndrome M47.812    Acute gastrointestinal bleeding K92.2    Thrombocytopenia (HCC) D69.6    Hepatitis C virus infection resolved after antiviral drug therapy Z86.19    Gastrointestinal hemorrhage with melena K92.1    Alcohol abuse F10.10    Altered mental status R41.82    Hypocalcemia E83.51    Hypophosphatemia E83.39    Malignant neoplasm of lower third of esophagus (HCC) C15.5    COVID-19 U07.1    Anxiety F41.9    Current smoker F17.200    Severe comorbid illness R69    Gait instability R26.81    Abnormal findings on diagnostic imaging of spine R93.7    Cervical spinal stenosis M48.02    Spinal stenosis of lumbar region with neurogenic claudication M48.062    Low hemoglobin D64.9    Symptomatic anemia, microcytic, acute D64.9    Hypotension I95.9    Former smoker, 50+ pack years, quit 2016 Z87.891    HLD (hyperlipidemia) E78.5    Esophageal adenocarcinoma (HCC) C15.9    Anemia D64.9    Acute kidney injury ([address] N17.9    Ascites R18.8    Shortness of breath R06.02    Drop in hemoglobin R71.0    Portal hypertension (HCC) K76.6    Secondary esophageal varices (HCC) I85.10    Esophageal polyp K22.81    Acute kidney failure, unspecified (HCC) N17.9    Muscle weakness (generalized) M62.81    Other abnormalities of gait and mobility R26.89    GI bleed K92.2    Goals of care, counseling/discussion Z71.    DNR (do not resuscitate) discussion Z71.    ACP (advance care planning) Z71.    Palliative care encounter Z51.5    S/P TIPS (transjugular intrahepatic portosystemic shunt) Z95.828       Isolation/Infection:   Isolation            No Isolation          Patient Infection Status       Infection Onset Added Last Indicated Last Indicated By Review Planned Expiration Resolved Resolved By    None active    Resolved    COVID-19 (Rule Out) 22 COVID-19 & Influenza Combo (Ordered)   22 Rule-Out Test Resulted    COVID-19 (Rule Out) 22 COVID-19, Rapid (Ordered)   22 Rule-Out Test Resulted    COVID-19 (Rule Out) 22 COVID-19 & Influenza Combo (Ordered)   22 Rule-Out Test Resulted    COVID-19 21 COVID-19   21     COVID-19 (Rule Out) 21 COVID-19 (Ordered)   21 Rule-Out Test Resulted    COVID-19 (Rule Out) 20 COVID-19 (Ordered)   20 Rule-Out Test Resulted    COVID-19 (Rule Out) 20 COVID-19 (Ordered)   20 Rule-Out Test Resulted            Nurse Assessment:  Last Vital Signs: BP (!) 84/52   Pulse 65   Temp 98.5 °F (36.9 °C) (Oral)   Resp 12   Ht 5' 10\" (1.778 m)   Wt 186 lb 11.7 oz (84.7 kg)   SpO2 94%   BMI 26.79 kg/m²     Last documented pain score (0-10 scale): Pain Level: 5  Last Weight:   Wt Readings from Last 1 Encounters:   22 186 lb 11.7 oz (84.7 kg)     Mental Status:  oriented, alert, coherent, logical, thought processes intact, and able to concentrate and follow conversation    IV Access:  - Single Lumen Implantable Port- Right Subclavian    Nursing Mobility/ADLs:  Walking   Independent  Transfer  Independent  Bathing  Independent  Dressing  Independent  Toileting  Independent  Feeding  Independent  Med Admin  Assisted  Med Delivery   whole    Wound Care Documentation and Therapy:  Puncture 12/01/22 Abdomen (Active)   Wound Assessment Other (Comment) 12/02/22 0400   Dressing Status Clean, dry & intact 12/02/22 0400   Number of days: 0       Incision 12/01/22 Neck Right (Active)   Dressing Status Clean;Dry; Intact 12/01/22 1556   Dressing/Treatment Surgical glue 12/02/22 0400   Closure Surgical glue 12/02/22 0400   Margins Approximated 12/02/22 0400   Incision Assessment Dry 12/02/22 0400   Drainage Amount None 12/02/22 0400   Odor None 12/01/22 1556   Number of days: 1        Elimination:  Continence: Bowel: No  Bladder: No  Urinary Catheter: None   Colostomy/Ileostomy/Ileal Conduit: No       Date of Last BM: 12/01/2022    Intake/Output Summary (Last 24 hours) at 12/2/2022 1500  Last data filed at 12/1/2022 2000  Gross per 24 hour   Intake 230 ml   Output --   Net 230 ml     I/O last 3 completed shifts: In: 230 [P.O.:230]  Out: -     Safety Concerns: At Risk for Falls    Impairments/Disabilities:      None    Nutrition Therapy:  Current Nutrition Therapy:   - Oral Diet:  General    Routes of Feeding: Oral  Liquids: No Restrictions  Daily Fluid Restriction: no  Last Modified Barium Swallow with Video (Video Swallowing Test): not done    Treatments at the Time of Hospital Discharge:   Respiratory Treatments: none  Oxygen Therapy:  is not on home oxygen therapy.   Ventilator:    - No ventilator support    Rehab Therapies: Physical Therapy and Occupational Therapy  Weight Bearing Status/Restrictions: No weight bearing restrictions  Other Medical Equipment (for information only, NOT a DME order):  none  Other Treatments: none    Patient's personal belongings (please select all that are sent with patient):  Glasses, cellphone    RN SIGNATURE:  Electronically signed by Konstantin Oswald RN on 12/2/22 at 3:09 PM EST    CASE MANAGEMENT/SOCIAL WORK SECTION    Inpatient Status Date: ***    Readmission Risk Assessment Score:  Readmission Risk              Risk of Unplanned Readmission:  0           Discharging to Facility/ Agency   Name:   Address:  Phone:  Fax:    Dialysis Facility (if applicable)   Name:  Address:  Dialysis Schedule:  Phone:  Fax:    / signature: {Esignature:[id number]}    PHYSICIAN SECTION    Prognosis: {Prognosis:7718648370}    Condition at Discharge: [address] [place] [address] Patient Condition:[id number]}    Rehab Potential (if transferring to Rehab): {Prognosis:2655137004}    Recommended Labs or Other Treatments After Discharge: ***    Physician Certification: I certify the above information and transfer of Chasidy Casey  is necessary for the continuing treatment of the diagnosis listed and that he requires {Admit to Appropriate Level of Care:88933} for {GREATER/LESS:868060409} 30 days.      Update Admission H&P: {CHP DME Changes in WVOWT:313626189}    PHYSICIAN SIGNATURE:  {Esignature:[id number]}

## 2022-12-02 NOTE — CARE COORDINATION
Case Management Assessment  Initial Evaluation    Date/Time of Evaluation: 12/2/2022 9:23 AM  Assessment Completed by: Alexandria Nelson RN    If patient is discharged prior to next notation, then this note serves as note for discharge by case management. Patient Name: Casandra Cyr                   YOB: 1960  Diagnosis: S/P TIPS (transjugular intrahepatic portosystemic shunt) [Z95.828]                   Date / Time: 12/1/2022  3:10 PM    Patient Admission Status: Observation   Readmission Risk (Low < 19, Mod (19-27), High > 27): Readmission Risk Score: 29.4    Current PCP: VASU Santiago  PCP verified by CM? (P) Yes    Chart Reviewed: Yes      History Provided by: (P) Patient  Patient Orientation: (P) Alert and Oriented    Patient Cognition: (P) Alert    Hospitalization in the last 30 days (Readmission):  No    If yes, Readmission Assessment in CM Navigator will be completed. Advance Directives:      Code Status: Full Code   Patient's Primary Decision Maker is:      Primary Decision Maker: Alessandra Luong [facility] - [phone]    Discharge Planning:    Patient lives with: (P) Alone Type of Home: (P) House  Primary Care Giver: (P) Self  Patient Support Systems include: (P) Family Members, Parent, Friends/Neighbors   Current Financial resources: (P) Medicare  Current community resources:    Current services prior to admission: (P) None            Current DME:              Type of Home Care services:  (P) None    ADLS  Prior functional level: (P) Independent in ADLs/IADLs  Current functional level: (P) Independent in ADLs/IADLs    PT AM-PAC:   /24  OT AM-PAC:   /24    Family can provide assistance at DC: Would you like Case Management to discuss the discharge plan with any other family members/significant others, and if so, who?     Plans to Return to Present Housing: (P) Yes  Other Identified Issues/Barriers to RETURNING to current housing: none  Potential Assistance needed at discharge: (P) N/A            Potential DME:    Patient expects to discharge to: (P) [facility] for transportation at discharge: (P) Family    Financial    Payor: Govind Barbour / Plan: Bhaskar Sharma / Product Type: *No Product type* /     Does insurance require precert for SNF: Yes    Potential assistance Purchasing Medications:    Meds-to-Beds request: No      [facility] [address] [id number] - [address]  [address] [place] [id number]  Phone: [phone] Fax: [phone]    [address] [place], [place] - [address] [phone] Sandy Parent [phone]  [facility] [address]  Phone: [phone] Fax: [phone]      Notes:    Factors facilitating achievement of predicted outcomes: Family support, Friend support, and Cooperative    Barriers to discharge: Medical complications    Additional Case Management Notes: patient plans to return home independently. He denies needs and has transportation    The Plan for Transition of Care is related to the following treatment goals of S/P TIPS (transjugular intrahepatic portosystemic shunt) [U10.195]    IF APPLICABLE: The Patient and/or patient representative Jose Cruz Guzman and his family were provided with a choice of provider and agrees with the discharge plan. Freedom of choice list with basic dialogue that supports the patient's individualized plan of care/goals and shares the quality data associated with the providers was provided to: (P) Patient   Patient Representative Name:       The Patient and/or Patient Representative Agree with the Discharge Plan? (P) Yes    Jocelyn Zavala RN  Case Management Department  Ph: [phone] Fax: [phone] from previous notes, patient is current with [facility]. Notified [facility] [place] of admission. They are able to resume care at discharge    [address] [facility] [address] notified of discharge today.  Patient will not need new order or claudia since he is admitted as observation

## 2022-12-02 NOTE — PROGRESS NOTES
Patient was given discharge instructions and verbalized understanding. Patient left with all personal belongings. Patient was taken off the unit via wheelchair and taken home with ride.

## 2022-12-05 ENCOUNTER — CARE COORDINATION (OUTPATIENT)
Dept: CASE MANAGEMENT | Age: 62
End: 2022-12-05

## 2022-12-05 NOTE — CARE COORDINATION
Care Transitions Outreach Attempt    Call within 2 business days of discharge: Yes   Attempted to reach patient for transitions of care follow up. Unable to reach patient. 1st attempt. Patient: Hardik Yang Patient : 1960 MRN: [id number]    Last Discharge  [address]       Date Complaint Diagnosis Description Type Department Provider    22   Admission (Discharged) FEDER[facility] CAR [address] Blood, DO              Was this an external facility discharge?  No Discharge Facility: [facility]    Noted following upcoming appointments from discharge chart review:   [facility] follow up appointment(s):   Future Appointments   Date Time Provider Carolyn English   2022  2:00 PM Margaret Carrasco MD [address] [place] [address]   2022 11:15 AM Salol Rolan, APRN - NP [facility]   2022  1:00 PM STC IR  [facility] Radiolog   2022  1:00 PM STC IR  [facility] Radiolog   2022  1:00 PM STC IR  [facility] Radiolog   2022  1:00 PM STC IR  [facility] Radiolog   2023  1:00 PM STC IR  [facility] Radiolog   2023 11:00 AM VASU Vang [facility]   2023  1:00 PM STC IR  [facility] Radiolog   2023  1:00 PM STC IR  [facility] Radiolog   2023  1:00 PM STC IR  [facility] Radiolog   2/3/2023  1:00 PM STC IR  [facility] Radiolog   2/10/2023  1:00 PM STC IR  [facility] Radiolog   2023  1:00 PM STC IR  [facility] Radiolog   2023  1:00 PM STC IR  [facility] Radiolog   3/3/2023  1:00 PM STC IR  [facility] Radiolog   3/10/2023  1:00 PM STC IR  [facility] Radiolog   3/17/2023  1:00 PM STC IR  [facility] Radiolog   3/24/2023  1:00 PM STC IR  [facility] Radiolog   3/31/2023  1:00 PM STC IR  [facility] Radiolog   2023  1:00 PM STC IR  [facility] Radiolog   4/14/2023  1:00 PM STC IR  [facility] Radiolog   4/21/2023  1:00 PM STC IR  [facility] Radiolog   4/28/2023  1:00 PM STC IR  [facility] Radiolog   5/5/2023  1:00 PM STC IR  [facility] Radiolog   5/12/2023  1:00 PM STC IR  [facility] Radiolog   5/19/2023  1:00 PM STC IR  [facility] Radiolog   5/26/2023  1:00 PM STC IR  [facility] Radiolog   6/2/2023  1:00 PM STC IR  [facility] Radiolog   6/9/2023  1:00 PM STC IR  [facility] Radiolog   6/16/2023  1:00 PM STC IR  [facility] ST[facility] Radiolog   6/23/2023  1:00 PM STC IR  [facility] ST[facility] Radiolog     Non-[facility] follow up appointment(s): n/a

## 2022-12-06 ENCOUNTER — CARE COORDINATION (OUTPATIENT)
Dept: CASE MANAGEMENT | Age: 62
End: 2022-12-06

## 2022-12-06 ENCOUNTER — OFFICE VISIT (OUTPATIENT)
Dept: ONCOLOGY | Age: 62
End: 2022-12-06
Payer: MEDICARE

## 2022-12-06 VITALS
OXYGEN SATURATION: 99 % | WEIGHT: 192 LBS | HEART RATE: 86 BPM | TEMPERATURE: 97.8 F | SYSTOLIC BLOOD PRESSURE: 117 MMHG | DIASTOLIC BLOOD PRESSURE: 59 MMHG | BODY MASS INDEX: 27.55 KG/M2

## 2022-12-06 DIAGNOSIS — C15.5 MALIGNANT NEOPLASM OF LOWER THIRD OF ESOPHAGUS (HCC): Primary | ICD-10-CM

## 2022-12-06 DIAGNOSIS — K70.11 ASCITES DUE TO ALCOHOLIC HEPATITIS: Primary | ICD-10-CM

## 2022-12-06 DIAGNOSIS — D64.9 ANEMIA, UNSPECIFIED TYPE: ICD-10-CM

## 2022-12-06 PROCEDURE — 99214 OFFICE O/P EST MOD 30 MIN: CPT | Performed by: INTERNAL MEDICINE

## 2022-12-06 PROCEDURE — 99211 OFF/OP EST MAY X REQ PHY/QHP: CPT | Performed by: INTERNAL MEDICINE

## 2022-12-06 PROCEDURE — 3074F SYST BP LT 130 MM HG: CPT | Performed by: INTERNAL MEDICINE

## 2022-12-06 PROCEDURE — 3078F DIAST BP <80 MM HG: CPT | Performed by: INTERNAL MEDICINE

## 2022-12-06 PROCEDURE — 1111F DSCHRG MED/CURRENT MED MERGE: CPT | Performed by: PHYSICIAN ASSISTANT

## 2022-12-06 NOTE — PROGRESS NOTES
Patient ID: Sandy Alfaro, [birth year], [id number], 60 y.o. Referred by : Dr Diane Lundberg  Reason for consultation:   Esophageal cancer T2N0Mx  Stage II   started on concurrent chemoradiation preoperatively on 5/4/21  Chemoradiation completed 6/9/21  Patient had a PET scan on 7/23/2021 which showed no evidence of recurrence  Patient has been evaluated by thoracic surgeon at [address] [place] [address],[address] Dr. Omid Vera and also hepatologist Dr. Patt Haq his case was discussed at thoracic tumor oncology board with recommendations NOT to do any surgery because of his liver failure. So surveillance recommended  He had an upper endoscopy on 8/31/21 which showed no residual cancer but showed Lezama's esophagus with high-grade dysplasia. Another endoscopy on 1/21/2022 was negative for recurrence  HISTORY OF PRESENT ILLNESS:    Oncologic History:  Sandy Alfaro is a 60 y.o. male with a history of hepatitis C, status post treatment, liver cirrhosis, history of alcohol abuse was seen during initial consultation visit for newly diagnosed esophageal cancer. Patient reportedly had \"heavy drinking alcohol in about 2016 however recently in December he had 2 beers and he presented with hematemesis. On 12/29/2020 he had upper endoscopy which showed severe portal hypertension, gastropathy. The biopsy from the GE junction showed invasive adenocarcinoma. Patient had a follow-up EGD on 2/2/2021 which confirmed esophageal adenocarcinoma. Patient had endoscopic ultrasound on 2/12/2021 which showed T2 N0 MX disease with underlying esophageal varices. In the esophagus hypoechoic lesion noted in the lower esophagus penetrating into submucosa and into muscularis propria. No lymphadenopathy noted. Patient was referred to medical oncology for further recommendations. He denies any difficulty swallowing. He does not smoke he has quit smoking in 2016. He has not drank alcohol since December.   He has a history of hepatitis C and he has received treatment. He lives by himself. He is accompanied by his ex-wife during today's office visit. INTERVAL HISTORY:  Patient is return for follow-up Sadan to discuss lab results, imaging studies and further recommendations. He underwent TIPS procedure with gastroenterology. He is feeling better. His platelets are stable and better. He denies any bleeding symptoms. He denies any abdominal pain nausea matting. During this visit patient's allergy, social, medical, surgical history and medications were reviewed and updated.     Past Medical History:   Diagnosis Date    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 ([address]     Alcoholic cirrhosis of liver with ascites ([address]     Anemia 04/13/2022    Anxiety     Arthritis     Back pain, chronic     dr. Makayla Irwin, orthopedic, every 3-4 months, gets steroid injection    Lezama esophagus     BPH (benign prostatic hypertrophy)     Cholelithiasis     Cirrhosis ([address]     COVID-19 12/2020    pt reports he had a positive test while at [facility] in [facility], was asymptomatic    COVID-19 vaccine series completed 5/20/2021, 6/22/2021    Moderna 5/20/2021, 6/22/2021    DDD (degenerative disc disease), lumbar     Depression     Esophageal cancer ([address]     INVASIVE ADENOCARCINOMA ARISING IN TUBULAR ADENOMA WITH HIGH GRADE DYSPLASIA, ASSOCIATED WITH FOCAL INTESTINAL METAPLASIA     Esophageal varices ([address]     Fatty liver     GERD (gastroesophageal reflux disease)     Hep C w/o coma, chronic ([address]     History of alcohol abuse     6-12 beers a day; quit drinking 2019    History of blood transfusion     History of colon polyps 2016    History of tobacco abuse     [place] (hard of hearing)     Hyperlipidemia     Hypertension     Hyponatremia 07/20/2016    Port-A-Cath in place     right upper chest    Portal hypertension ([address]     Sciatica     Secondary esophageal varices ([address] 06/07/2022    Shortness of breath     Spinal stenosis     Stomach ulcer     hx of    Tubular adenoma of colon 2016, 2018    Vitamin D deficiency     Wears glasses        Past Surgical History:   Procedure Laterality Date    BUNIONECTOMY      twice on right side    BUNIONECTOMY Left     CARPAL TUNNEL RELEASE Right     COLONOSCOPY      at age 35    COLONOSCOPY  10/05/2016    polyps-pathology tubular adenoma, and abnormal looking mucosa right colon-pathology-tubular adenoma    COLONOSCOPY N/A 03/30/2018    COLONOSCOPY POLYPECTOMY COLD BIOPSY performed by Rocky Pierre MD at [place] [address]  03/30/2018    Small polyp in the sigmoid colon and excised with biopsy forceps--tubular adenoma    COLONOSCOPY N/A 04/16/2022    COLONOSCOPY POLYPECTOMY performed by Rocky Pierre MD at [place] [address], COLON, DIAGNOSTIC      EGD    IR PORT PLACEMENT EQUAL OR GREATER THAN 5 YEARS  04/19/2021    IR PORT PLACEMENT EQUAL OR GREATER THAN 5 YEARS 4/19/2021 [address] [place] [address]    IR TIPS INSERTION  12/01/2022    IR TIPS INSERTION 12/1/2022 Eulogio Magana MD [facility]    KNEE SURGERY Left     cyst removed    NASAL SEPTUM SURGERY      NERVE BLOCK Right 11/23/2020    NERVE BLOCK RIGHT CERVICAL STEROID INJECTION  C3-C6 performed by Reji Ba MD at [address] [place] [address]  01/04/2016    steroid injection C7 T1    OTHER SURGICAL HISTORY  11/21/2016    Bilateral Lumbar CACHORRO L4-L5 injections    OTHER SURGICAL HISTORY  12/19/2016    lumbar steroid injection    OTHER SURGICAL HISTORY  09/28/2018    BILATERAL L5 CACHORRO (N/A Back)    OTHER SURGICAL HISTORY Right 11/23/2020    cervical injection    PAIN MANAGEMENT PROCEDURE Left 07/09/2020    EPIDURAL STEROID INJECTION LEFT L4 L5 performed by Reji Ba MD at [facility] Left 07/20/2020    LEFT L4 L5 EPIDURAL STEROID INJECTION performed by Reji Ba MD at [facility] Bilateral 08/17/2020    LUMBAR FACET BILATERAL L2-L5 performed by Reji Ba MD at [facility] Bilateral 12/07/2020    NERVE BLOCK BILATERAL LUMBAR MEDIAL BRANCH L2-L5 performed by Zach Spence MD at [facility]      [place]    [place] [address] AA&/STRD TFRML EPI LUMBAR/SACRAL 1 LEVEL Bilateral 09/06/2018    BILATERAL L5 CACHORRO performed by Zach Spence MD at [facility] AA&/STRD TFRML EPI LUMBAR/SACRAL 1 LEVEL N/A 09/28/2018    BILATERAL L5 CACHORRO performed by Zach Spence MD at [address] [place] [address] N/CARPAL TUNNEL SURG Right 08/29/2017    CARPAL TUNNEL RELEASE RIGHT performed by Hemant Jang MD at [address] [place] [address] N/CARPAL TUNNEL SURG Left 10/31/2017    CARPAL TUNNEL RELEASE performed by Hemant Jang MD at [address] [place] [address] 12/29/2020    EGD BIOPSY performed by Dawit Mitchell MD at [address] [place] [address] 02/02/2021    EGD BIOPSY and spot marking performed by Renay Smiley MD at [address] [place] [address] 02/12/2021    ENDOSCOPIC ULTRASOUND, EGD performed by Marcus Santillan MD at [address] [place] [address]  02/12/2021    EGD DIAGNOSTIC ONLY performed by Marcus Santillan MD at [address] [place] [address] 08/31/2021    EGD BIOPSY performed by Renay Smiley MD at [address] [place] [address] 01/21/2022    EGD BIOPSY performed by Renay Smiley MD at [place] [address] N/A 04/15/2022    EGD ESOPHAGOGASTRODUODENOSCOPY performed by Dawit Mitchell MD at [address] [place] [address] 06/06/2022    EGD BAND LIGATION performed by Merari Ron MD at [place] [address] N/A 06/09/2022    EGD ESOPHAGOGASTRODUODENOSCOPY performed by Merari Ron MD at [place] [address] N/A 09/14/2022    EGD ESOPHAGOGASTRODUODENOSCOPY ENDOSCOPIC APC AT GE JUNCTION performed by Coreen Seymour MD at [address] [place] [address] ENDOSCOPY N/A 10/19/2022    EGD BIOPSY performed by Romel Murray MD at [facility] 10/31/2022    EGD with APC performed by Romel Murray MD at [address] [place] [address]       No Known Allergies      Current Outpatient Medications   Medication Sig Dispense Refill    pantoprazole (PROTONIX) 40 MG tablet       FEROSUL 325 (65 Fe) MG tablet take 1 tablet by mouth twice a day 60 tablet 5    nadolol (CORGARD) 20 MG tablet take 1 tablet by mouth once daily 30 tablet 2    midodrine (PROAMATINE) 5 MG tablet Take 2 tablets by mouth in the morning and 2 tablets at noon and 2 tablets before bedtime. 90 tablet 3    furosemide (LASIX) 20 MG tablet Take 1 tablet by mouth 2 times daily 60 tablet 3    spironolactone (ALDACTONE) 100 MG tablet Take 1 tablet by mouth every evening 30 tablet 1    lactulose (CHRONULAC) 10 GM/15ML solution take 30 milliliters by mouth three times a day 473 mL 1    FLUoxetine (PROZAC) 20 MG capsule Take 1 capsule by mouth daily 30 capsule 3    atorvastatin (LIPITOR) 20 MG tablet Take 1 tablet by mouth nightly 30 tablet 3    diphenhydrAMINE (BENADRYL) 25 MG tablet Take 1 tablet by mouth every 6 hours as needed for Itching (Patient not taking: No sig reported)      ondansetron (ZOFRAN-ODT) 4 MG disintegrating tablet dissolve 1 tablet by mouth every 8 hours if needed for nausea and vomiting (Patient not taking: No sig reported) 10 tablet 0     No current facility-administered medications for this visit.        Social History     Socioeconomic History    Marital status: Single     Spouse name: Not on file    Number of children: Not on file    Years of education: Not on file    Highest education level: Not on file   Occupational History    Not on file   Tobacco Use    Smoking status: Former     Packs/day: 1.00     Years: 45.00     Pack years: 45.00     Types: Cigarettes     Quit date: 2017     Years since quittin.8    Smokeless tobacco: Never   Vaping Use    Vaping Use: Never used   Substance and Sexual Activity    Alcohol use: Not Currently     Comment: Quit alcohol in 2019- heavier drinking prior to quitting    Drug use: Not Currently     Frequency: 1.0 times per week     Types: Cocaine     Comment: Cocaine- stopped spring 2016    Sexual activity: Yes     Partners: Female   Other Topics Concern    Not on file   Social History Narrative     in the past, retired     Social Determinants of Health     Financial Resource Strain: Low Risk     Difficulty of Paying Living Expenses: Not hard at all   Food Insecurity: No Food Insecurity    Worried About [email or web address] in the Last Year: Never true    [address] in the Last Year: Never true   Transportation Needs: Not on file   Physical Activity: Inactive    Days of Exercise per Week: 0 days    Minutes of Exercise per Session: 0 min   Stress: Not on file   Social Connections: Not on file   Intimate Partner Violence: Not on file   Housing Stability: Not on file       Family History   Problem Relation Age of Onset    Cancer Mother         pancreatic    Cancer Father         bone    Diabetes Sister     Asthma Brother         REVIEW OF SYSTEM:     Constitutional: No fever or chills. No night sweats, no weight loss   Eyes: No eye discharge, double vision, or eye pain   HEENT: negative for sore mouth, sore throat, hoarseness and voice change   Respiratory: negative for cough , sputum, dyspnea, wheezing, hemoptysis, chest pain   Cardiovascular: negative for chest pain, dyspnea, palpitations, orthopnea, PND   Gastrointestinal: negative for nausea, vomiting, diarrhea, constipation, abdominal pain, Dysphagia, hematemesis and hematochezia   Genitourinary: negative for frequency, dysuria, nocturia, urinary incontinence, and hematuria   Integument: negative for rash, skin lesions, bruises.    Hematologic/Lymphatic: negative for easy bruising, bleeding, lymphadenopathy, petechiae and swelling/edema Endocrine: negative for heat or cold intolerance, tremor, weight changes, change in bowel habits and hair loss   Musculoskeletal: negative for myalgias, arthralgias, pain, joint swelling,and bone pain   Neurological: negative for headaches, dizziness, seizures, weakness, numbness       OBJECTIVE:         Vitals:    12/06/22 1423   BP: (!) 117/59   Pulse: 86   Temp: 97.8 °F (36.6 °C)   SpO2: 99%       PHYSICAL EXAM:   General appearance - well appearing, no in pain or distress   Mental status - alert and cooperative   Eyes - pupils equal and reactive, extraocular eye movements intact   Ears - bilateral TM's and external ear canals normal   Mouth - mucous membranes moist, pharynx normal without lesions   Neck - supple, no significant adenopathy   Lymphatics - no palpable lymphadenopathy, no hepatosplenomegaly   Chest - clear to auscultation, no wheezes, rales or rhonchi, symmetric air entry   Heart - normal rate, regular rhythm, normal S1, S2, no murmurs, rubs, clicks or gallops   Abdomen - soft, nontender, nondistended, no masses or organomegaly   Neurological - alert, oriented, normal speech, no focal findings or movement disorder noted   Musculoskeletal - no joint tenderness, deformity or swelling   Extremities - peripheral pulses normal, no pedal edema, no clubbing or cyanosis   Skin - normal coloration and turgor, no rashes, no suspicious skin lesions noted   LABORATORY DATA:     Lab Results   Component Value Date    WBC 8.7 12/02/2022    HGB 7.8 (L) 12/02/2022    HCT 27.7 (L) 12/02/2022    MCV 88.5 12/02/2022     (L) 12/02/2022    LYMPHOPCT 6 (L) 12/01/2022    RBC 3.13 (L) 12/02/2022    MCH 24.9 (L) 12/02/2022    MCHC 28.2 (L) 12/02/2022    RDW 15.9 (H) 12/02/2022    MONOPCT 15 (H) 12/01/2022    BASOPCT 0 12/01/2022    NEUTROABS 4.94 12/01/2022    LYMPHSABS 0.38 (L) 12/01/2022    MONOSABS 0.96 12/01/2022    EOSABS 0.06 12/01/2022    BASOSABS 0.00 12/01/2022       Chemistry        Component Value Date/Time     (L) 12/02/2022 0659    K 4.2 12/02/2022 0659    CL 96 (L) 12/02/2022 0659    CO2 23 12/02/2022 0659    BUN 9 12/02/2022 0659    CREATININE 0.50 (L) 12/02/2022 0659        Component Value Date/Time    CALCIUM 8.8 12/02/2022 0659    ALKPHOS 114 12/01/2022 1100    AST 30 12/01/2022 1100    ALT 11 12/01/2022 1100    BILITOT 0.9 12/01/2022 1100        PATHOLOGY DATA:   Surgical Pathology Report  Surgical Pathology  Collected: 12/29/20 1334   Lab status: Final   Resulting lab: [facility]   Value: [id number]   [facility]   [address] [place] [address], [id number] [place] [address]   [phone]   Fax: [phone]   SURGICAL PATHOLOGY REPORT     Patient Name: Meng Whiting   MR#: [id number]   Specimen #[id number]         Final Diagnosis   GASTROESOPHAGEAL JUNCTION, BIOPSY:          INVASIVE ADENOCARCINOMA    Final Diagnosis   ESOPHAGUS, LESION AT 34 CM, BIOPSY:          INVASIVE ADENOCARCINOMA ARISING IN TUBULAR ADENOMA WITH HIGH   GRADE DYSPLASIA, ASSOCIATED WITH FOCAL INTESTINAL METAPLASIA (SEE   COMMENT)     Diagnosis Comment   The malignancy diagnosis is confirmed by review of a second   pathologist.  The result of HER2 IHC with reflex to [address] [place] [address] for   gastroesophageal adenocarcinoma will be issued in an addendum. ADDENDUM (SCM)     Date Ordered:     2/16/2021     Status: Signed Out        Date Complete:     2/16/2021     By: Sandee Springer M.D. Date Reported:     2/16/2021       ADDENDUM COMMENT   This addendum is issued in order to incorporate the result of HER2 by   [address] [place] [address], performed at [address] [place] [address] ([id number], 02/16/2021).        \"RESULTS:  INDETERMINATE     Interpretation:     Average HER2 signals/nucleus:  4.4   Average SUNG 17 signals/nucleus:  3.4   HER2/SUNG 17 signal ratio:  1.3   Number of Observers:  2     Even after analysis of additional cells and review of slides by a   pathologist, FISH results show a HER2/SUNG 17 ratio < 2.0 and an average of 4-6 HER2 signals per nucleus and 3 or more SUNG 17 signals   per nucleus. The results are INDETERMINATE. In this situation, the 2016 CAP/ASCP/ASCO guidelines recommend   selecting a different tumor block for HER2 testing, if available. \"       Of note, there is only one block available for testing of invasive   esophageal adenocarcinoma tumor cells. It was already used for HER2   IHC and HER2 FISH testing with the results issued in the addendums. IMAGING DATA:    Reviewed  CT chest abdomen pelvis 10/20/2021  Impression   1. Stable exam of the chest, abdomen and pelvis without evidence for   metastatic disease. 2.  The esophagus is decompressed. Mucosal enhancement in the distal   esophagus may be due to underlying varices. Underlying inflammation or mass   is considered less likely given the stability of this finding and recent   negative PET-CT. 3.  Morphologic findings of cirrhosis and portal hypertension again   demonstrated. No focal liver lesion identified. Trace abdominopelvic   ascites. Small varices. ASSESSMENT:    Talisha Rosa is a 60 y.o. male with history of hepatitis C, liver cirrhosis, history of alcohol abuse, with esophageal cancer. I reviewed the NCCN guidelines and goals of care. Clinically appears to have T2 N0 M0  Stag II, disease. Started on preoperative  concurrent chemoradiation  Now he has completed chemoradiation  A PET scan on 7/21/21 after chemoradiation showed no metabolic evidence of active neoplasm. Induced at [facility] recommended surveillance. Every week    TIPS next week    During today's visit, the patient and the family had a number of reasonable questions which were answered to their satisfaction. They verbalized understanding of the information provided and they agreed to proceed as outlined above.      PLAN:   I reviewed his recent lab work, imaging studies, discussed diagnosis and treatment recommendations  He underwent TIPS procedure with gastroenterology  We will check labs today  Return to clinic 2 months with labs prior or earlier if needed        Garrison Key MD  Hematologist/Medical Oncologist    On this date 12/6/22  I have spent 40 minutes reviewing previous notes, test results and face to face with the patient discussing the diagnosis and importance of compliance with the treatment plan. Greater than 50% of that time was spent face-to-face with the patient in counseling and coordinating her care. This note is created with the assistance of a speech recognition program.  While intending to generate a document that actually reflects the content of the visit, the document can still have some errors including those of syntax and sound a like substitutions which may escape proof reading. It such instances, actual meaning can be extrapolated by contextual diversion.

## 2022-12-06 NOTE — CARE COORDINATION
[facility] Care Transitions Initial Follow Up Call    Call within 2 business days of discharge: Yes    Care Transition Nurse contacted the patient by telephone to perform post hospital discharge assessment. Verified name and  with patient as identifiers. Provided introduction to self, and explanation of the Care Transition Nurse role. Patient: Kyra Marie Patient : 1960   MRN: [id number]  Reason for Admission: s/p TIPS  Discharge Date: 22 RARS: Readmission Risk Score: 29.4      Last Discharge [address]       Date Complaint Diagnosis Description Type Department Provider    22   Admission (Discharged) [facility] CAR 2 Radha Sanchez Blood, DO            Was this an external facility discharge? No Discharge Facility: [facility]    Challenges to be reviewed by the provider   Additional needs identified to be addressed with provider: No  none               Method of communication with provider: none. Spoke to Davida Marquez for initial transitions call. Pt stated he is doing OK since discharge. Stated he had a rough time during recovery after surgery, had pain and vomiting but was fine by discharge. Denies current pain, stiffness, nausea, vomiting. Stated his abdomen is still filling up with fluid, will continue to have paracentesis q Friday for a few more weeks then should subside. Appetite is good, bowels are moving. Adheres to low sodium diet. Medications reviewed. Pt could not find Benadryl at [facility] yesterday, told him to look in allergy/cold aisle. Pt will  Benadryl and use as needed for itching. Denies itching at this time. Pt has VN q Tuesday. Stated he monitors BP at home, usually runs @ 110/70 range. Pt has appt today with Dr Cady Magana, has GI appt on 22. Agreeable to transitions calls. Care Transition Nurse reviewed discharge instructions with patient who verbalized understanding.  The patient was given an opportunity to ask questions and does not have any further questions or concerns at this time. Were discharge instructions available to patient? Yes. Reviewed appropriate site of care based on symptoms and resources available to patient including: PCP  Specialist  Home health  When to call [address] The patient agrees to contact the PCP office for questions related to their healthcare. Medication reconciliation was performed with patient, who verbalizes understanding of administration of home medications.  Medications reviewed, 1111F entered: yes    Was patient discharged with a pulse oximeter? no    Non-face-to-face services provided:  Obtained and reviewed discharge summary and/or continuity of care documents  Assessment and support for treatment adherence and medication management-[id number]    Offered patient enrollment in the Remote Patient Monitoring (RPM) program for in-home monitoring: Patient is not eligible for RPM program.    Care Transitions 24 Hour Call    Do you have a copy of your discharge instructions?: Yes  Do you have all of your prescriptions and are they filled?: No  Have you been contacted by a [address]?: No  Have you scheduled your follow up appointment?: Yes  How are you going to get to your appointment?: Car - drive self  Do you feel like you have everything you need to keep you well at home?: Yes  Care Transitions Interventions         Follow Up  Future Appointments   Date Time Provider Carolyn English   12/6/2022  2:00 PM Luz Childress MD [address] [place] [address]   12/8/2022 11:15 AM Birdie Economy, APRN - NP [facility]   12/9/2022  1:00 PM [facility] IR  [facility]   12/16/2022  1:00 PM [facility] IR  [facility]   12/23/2022  1:00 PM [facility] IR  [facility]   12/30/2022  1:00 PM [facility] IR  [facility]   1/6/2023  1:00 PM [facility] IR  [facility]   1/11/2023 11:00 AM [facility] [place] [facility]   1/13/2023  1:00 PM STC IR RM [id number] Doug Herbert [facility]   1/20/2023  1:00 PM [facility] IR  [facility]   1/27/2023  1:00 PM [facility] IR  [facility]   2/3/2023  1:00 PM [facility] IR  [facility]   2/10/2023  1:00 PM [facility] IR  [facility]   2/17/2023  1:00 PM [facility] IR  [facility]   2/24/2023  1:00 PM [facility] IR  [facility]   3/3/2023  1:00 PM [facility] IR  [facility]   3/10/2023  1:00 PM [facility] IR  [facility]   3/17/2023  1:00 PM [facility] IR  [facility]   3/24/2023  1:00 PM [facility] IR  [facility]   3/31/2023  1:00 PM [facility] IR  [facility]   4/7/2023  1:00 PM [facility] IR  [facility]   4/14/2023  1:00 PM [facility] IR  [facility]   4/21/2023  1:00 PM [facility] IR  [facility]   4/28/2023  1:00 PM [facility] IR  [facility]   5/5/2023  1:00 PM [facility] IR  [facility]   5/12/2023  1:00 PM [facility] IR  [facility]   5/19/2023  1:00 PM [facility] IR  [facility]   5/26/2023  1:00 PM [facility] IR  [facility]   6/2/2023  1:00 PM [facility] IR  [facility] Radiolog   6/9/2023  1:00 PM [facility] IR  [facility] Radiolog   6/16/2023  1:00 PM [facility] IR  [facility]   6/23/2023  1:00 PM [facility] IR  [facility]       Care Transition Nurse provided contact information. Plan for follow-up call in 5-7 days based on severity of symptoms and risk factors. Plan for next call: symptom management-pin, adb swelling, nausea, BP  follow-up appointment-Onc and GI appt review. Changes?     Aaron Lopez RN

## 2022-12-07 ENCOUNTER — HOSPITAL ENCOUNTER (OUTPATIENT)
Age: 62
Discharge: HOME | End: 2022-12-07
Payer: MEDICARE

## 2022-12-07 ENCOUNTER — TELEPHONE (OUTPATIENT)
Dept: ONCOLOGY | Age: 62
End: 2022-12-07

## 2022-12-07 DIAGNOSIS — K70.31 ASCITES DUE TO ALCOHOLIC CIRRHOSIS (HCC): ICD-10-CM

## 2022-12-07 DIAGNOSIS — D64.9 ANEMIA, UNSPECIFIED TYPE: ICD-10-CM

## 2022-12-07 LAB
ABSOLUTE EOS #: 0 K/UL (ref 0–0.4)
ABSOLUTE EOS #: 0 K/UL (ref 0–0.4)
ABSOLUTE LYMPH #: 0.53 K/UL (ref 1–4.8)
ABSOLUTE LYMPH #: 0.53 K/UL (ref 1–4.8)
ABSOLUTE MONO #: 0.37 K/UL (ref 0.1–1.3)
ABSOLUTE MONO #: 0.37 K/UL (ref 0.1–1.3)
ALBUMIN SERPL-MCNC: 3 G/DL (ref 3.5–5.2)
ALP BLD-CCNC: 179 U/L (ref 40–129)
ALT SERPL-CCNC: 29 U/L (ref 5–41)
ANION GAP SERPL CALCULATED.3IONS-SCNC: 7 MMOL/L (ref 9–17)
AST SERPL-CCNC: 52 U/L
BASOPHILS # BLD: 0 % (ref 0–2)
BASOPHILS # BLD: 0 % (ref 0–2)
BASOPHILS ABSOLUTE: 0 K/UL (ref 0–0.2)
BASOPHILS ABSOLUTE: 0 K/UL (ref 0–0.2)
BILIRUB SERPL-MCNC: 1.1 MG/DL (ref 0.3–1.2)
BUN BLDV-MCNC: 5 MG/DL (ref 8–23)
CALCIUM SERPL-MCNC: 8.2 MG/DL (ref 8.6–10.4)
CHLORIDE BLD-SCNC: 105 MMOL/L (ref 98–107)
CO2: 24 MMOL/L (ref 20–31)
CREAT SERPL-MCNC: 0.6 MG/DL (ref 0.7–1.2)
EOSINOPHILS RELATIVE PERCENT: 0 % (ref 0–4)
EOSINOPHILS RELATIVE PERCENT: 0 % (ref 0–4)
FERRITIN: 37 NG/ML (ref 30–400)
GFR SERPL CREATININE-BSD FRML MDRD: >60 ML/MIN/1.73M2
GLUCOSE BLD-MCNC: 121 MG/DL (ref 70–99)
HCT VFR BLD CALC: 26.8 % (ref 41–53)
HCT VFR BLD CALC: 26.8 % (ref 41–53)
HEMOGLOBIN: 8.3 G/DL (ref 13.5–17.5)
HEMOGLOBIN: 8.3 G/DL (ref 13.5–17.5)
IRON SATURATION: 5 % (ref 20–55)
IRON: 14 UG/DL (ref 59–158)
LYMPHOCYTES # BLD: 13 % (ref 24–44)
LYMPHOCYTES # BLD: 13 % (ref 24–44)
MCH RBC QN AUTO: 24.8 PG (ref 26–34)
MCH RBC QN AUTO: 24.8 PG (ref 26–34)
MCHC RBC AUTO-ENTMCNC: 30.8 G/DL (ref 31–37)
MCHC RBC AUTO-ENTMCNC: 30.8 G/DL (ref 31–37)
MCV RBC AUTO: 80.4 FL (ref 80–100)
MCV RBC AUTO: 80.4 FL (ref 80–100)
MONOCYTES # BLD: 9 % (ref 1–7)
MONOCYTES # BLD: 9 % (ref 1–7)
MORPHOLOGY: ABNORMAL
PDW BLD-RTO: 17.2 % (ref 11.5–14.9)
PDW BLD-RTO: 17.2 % (ref 11.5–14.9)
PLATELET # BLD: 126 K/UL (ref 150–450)
PLATELET # BLD: 126 K/UL (ref 150–450)
PMV BLD AUTO: 6.8 FL (ref 6–12)
PMV BLD AUTO: 6.8 FL (ref 6–12)
POTASSIUM SERPL-SCNC: 4.6 MMOL/L (ref 3.7–5.3)
RBC # BLD: 3.33 M/UL (ref 4.5–5.9)
RBC # BLD: 3.33 M/UL (ref 4.5–5.9)
SEG NEUTROPHILS: 78 % (ref 36–66)
SEG NEUTROPHILS: 78 % (ref 36–66)
SEGMENTED NEUTROPHILS ABSOLUTE COUNT: 3.2 K/UL (ref 1.3–9.1)
SEGMENTED NEUTROPHILS ABSOLUTE COUNT: 3.2 K/UL (ref 1.3–9.1)
SODIUM BLD-SCNC: 136 MMOL/L (ref 135–144)
TOTAL IRON BINDING CAPACITY: 270 UG/DL (ref 250–450)
TOTAL PROTEIN: 5.4 G/DL (ref 6.4–8.3)
UNSATURATED IRON BINDING CAPACITY: 256 UG/DL (ref 112–347)
WBC # BLD: 4.1 K/UL (ref 3.5–11)
WBC # BLD: 4.1 K/UL (ref 3.5–11)

## 2022-12-07 PROCEDURE — 80053 COMPREHEN METABOLIC PANEL: CPT

## 2022-12-07 PROCEDURE — 83550 IRON BINDING TEST: CPT

## 2022-12-07 PROCEDURE — 83540 ASSAY OF IRON: CPT

## 2022-12-07 PROCEDURE — 82728 ASSAY OF FERRITIN: CPT

## 2022-12-07 PROCEDURE — 36415 COLL VENOUS BLD VENIPUNCTURE: CPT

## 2022-12-07 PROCEDURE — 85025 COMPLETE CBC W/AUTO DIFF WBC: CPT

## 2022-12-08 ENCOUNTER — HOSPITAL ENCOUNTER (OUTPATIENT)
Age: 62
Discharge: HOME | End: 2022-12-10

## 2022-12-08 ENCOUNTER — HOSPITAL ENCOUNTER (OUTPATIENT)
Dept: GENERAL RADIOLOGY | Age: 62
Discharge: HOME | End: 2022-12-10

## 2022-12-08 ENCOUNTER — OFFICE VISIT (OUTPATIENT)
Dept: GASTROENTEROLOGY | Age: 62
End: 2022-12-08
Payer: MEDICARE

## 2022-12-08 VITALS
WEIGHT: 197 LBS | BODY MASS INDEX: 28.2 KG/M2 | HEART RATE: 97 BPM | DIASTOLIC BLOOD PRESSURE: 66 MMHG | OXYGEN SATURATION: 98 % | HEIGHT: 70 IN | SYSTOLIC BLOOD PRESSURE: 128 MMHG

## 2022-12-08 DIAGNOSIS — K76.6 PORTAL HYPERTENSIVE GASTROPATHY (HCC): ICD-10-CM

## 2022-12-08 DIAGNOSIS — K31.89 PORTAL HYPERTENSIVE GASTROPATHY (HCC): ICD-10-CM

## 2022-12-08 DIAGNOSIS — K22.711 BARRETT'S ESOPHAGUS WITH HIGH GRADE DYSPLASIA: ICD-10-CM

## 2022-12-08 DIAGNOSIS — R06.02 SHORTNESS OF BREATH: Primary | ICD-10-CM

## 2022-12-08 DIAGNOSIS — R06.02 SHORTNESS OF BREATH: ICD-10-CM

## 2022-12-08 DIAGNOSIS — D64.9 ANEMIA, UNSPECIFIED TYPE: ICD-10-CM

## 2022-12-08 DIAGNOSIS — C80.1 ADENOCARCINOMA IN A POLYP (HCC): ICD-10-CM

## 2022-12-08 DIAGNOSIS — K70.31 ALCOHOLIC CIRRHOSIS OF LIVER WITH ASCITES (HCC): ICD-10-CM

## 2022-12-08 PROCEDURE — 3074F SYST BP LT 130 MM HG: CPT | Performed by: NURSE PRACTITIONER

## 2022-12-08 PROCEDURE — 3078F DIAST BP <80 MM HG: CPT | Performed by: NURSE PRACTITIONER

## 2022-12-08 PROCEDURE — 99214 OFFICE O/P EST MOD 30 MIN: CPT | Performed by: NURSE PRACTITIONER

## 2022-12-08 ASSESSMENT — ENCOUNTER SYMPTOMS
COLOR CHANGE: 1
RECTAL PAIN: 0
CONSTIPATION: 0
DIARRHEA: 0
ABDOMINAL DISTENTION: 1
ANAL BLEEDING: 0
COUGH: 0
SHORTNESS OF BREATH: 1
ALLERGIC/IMMUNOLOGIC NEGATIVE: 1
CHOKING: 0
VOMITING: 0
TROUBLE SWALLOWING: 0
WHEEZING: 0
BACK PAIN: 1

## 2022-12-08 NOTE — PROGRESS NOTES
GI CLINIC FOLLOW UP    INTERVAL HISTORY:   No referring provider defined for this encounter. Chief Complaint   Patient presents with    Cirrhosis       HISTORY OF PRESENT ILLNESS:     Patient seen for cirrhosis follow-up. Patient is s/p TIPS 12/1/22. Hgb stable, 8.3  No signs of encephalopathy. No signs of bleeding    Reports SOB over the past few days. No fever, chills, dizziness    He had Echo last year  EF 60%  L atrium mildly dilated  No significant valvular regurg. No aortic stenosis seen. Past Medical,Family, and Social History reviewed and does contribute to the patient presentingcondition. Patient's PMH/PSH,SH,PSYCH Hx, MEDs, ALLERGIES, and ROS were all reviewed and updated in the appropriate sections.     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 Hari Downs, orthopedic, every 3-4 months, gets steroid injection    Lezama esophagus     BPH (benign prostatic hypertrophy)     Cholelithiasis     Cirrhosis ([address]     COVID-19 12/2020    pt reports he had a positive test while at [facility] in 2020, was asymptomatic    COVID-19 vaccine series completed 5/20/2021, 6/22/2021    Moderna 5/20/2021, 6/22/2021    DDD (degenerative disc disease), lumbar     Depression     Esophageal cancer ([address]     INVASIVE ADENOCARCINOMA ARISING IN TUBULAR ADENOMA WITH HIGH GRADE DYSPLASIA, ASSOCIATED WITH FOCAL INTESTINAL METAPLASIA     Esophageal varices ([address]     Fatty liver     GERD (gastroesophageal reflux disease)     Hep C w/o coma, chronic ([address]     History of alcohol abuse     6-12 beers a day; quit drinking 2019    History of blood transfusion     History of colon polyps 2016    History of tobacco abuse     [place] (hard of hearing)     Hyperlipidemia     Hypertension     Hyponatremia 07/20/2016    Port-A-Cath in place     right upper chest    Portal hypertension (HCC)     Sciatica     Secondary esophageal varices ([address] 06/07/2022    Shortness of breath     Spinal stenosis     Stomach ulcer     hx of    Tubular adenoma of colon 2016, 2018    Vitamin D deficiency     Wears glasses        Past Surgical History:   Procedure Laterality Date    BUNIONECTOMY      twice on right side    BUNIONECTOMY Left     CARPAL TUNNEL RELEASE Right     COLONOSCOPY      at age 35    COLONOSCOPY  10/05/2016    polyps-pathology tubular adenoma, and abnormal looking mucosa right colon-pathology-tubular adenoma    COLONOSCOPY N/A 03/30/2018    COLONOSCOPY POLYPECTOMY COLD BIOPSY performed by Gwen Alejo MD at [facility]  03/30/2018    Small polyp in the sigmoid colon and excised with biopsy forceps--tubular adenoma    COLONOSCOPY N/A 04/16/2022    COLONOSCOPY POLYPECTOMY performed by Gwen Alejo MD at [facility], DIAGNOSTIC      EGD    IR PORT PLACEMENT EQUAL OR GREATER THAN 5 YEARS  04/19/2021    IR PORT PLACEMENT EQUAL OR GREATER THAN 5 YEARS 4/19/2021 [address] [place] [address]    IR TIPS INSERTION  12/01/2022    IR TIPS INSERTION 12/1/2022 Sury Alvarenga MD [facility]    KNEE SURGERY Left     cyst removed    NASAL SEPTUM SURGERY      NERVE BLOCK Right 11/23/2020    NERVE BLOCK RIGHT CERVICAL STEROID INJECTION  C3-C6 performed by Vamsi Polanco MD at [address] [place] [address]  01/04/2016    steroid injection C7 T1    OTHER SURGICAL HISTORY  11/21/2016    Bilateral Lumbar CACHORRO L4-L5 injections    OTHER SURGICAL HISTORY  12/19/2016    lumbar steroid injection    OTHER SURGICAL HISTORY  09/28/2018    BILATERAL L5 CACHORRO (N/A Back)    OTHER SURGICAL HISTORY Right 11/23/2020    cervical injection    PAIN MANAGEMENT PROCEDURE Left 07/09/2020    EPIDURAL STEROID INJECTION LEFT L4 L5 performed by Vamsi Polanco MD at [facility] Left 07/20/2020    LEFT L4 L5 EPIDURAL STEROID INJECTION performed by Maria Antonia Boyce MD at [facility] Bilateral 08/17/2020    LUMBAR FACET BILATERAL L2-L5 performed by Maria Antonia Boyce MD at [facility] Bilateral 12/07/2020    NERVE BLOCK BILATERAL LUMBAR MEDIAL BRANCH L2-L5 performed by Maria Antonia Boyce MD at [address] [place] [address]      [facility]    ME [address] AA&/STRD TFRML EPI LUMBAR/SACRAL 1 LEVEL Bilateral 09/06/2018    BILATERAL L5 CACHORRO performed by Maria Antonia Boyce MD at [facility] AA&/STRD TFRML EPI LUMBAR/SACRAL 1 LEVEL N/A 09/28/2018    BILATERAL L5 CACHORRO performed by Maria Antonia Boyce MD at [facility] N/CARPAL TUNNEL SURG Right 08/29/2017    CARPAL TUNNEL RELEASE RIGHT performed by Mel Mcneill MD at [address] [place] [address] N/CARPAL TUNNEL SURG Left 10/31/2017    CARPAL TUNNEL RELEASE performed by Mel Mcneill MD at [address] [place] [address] 12/29/2020    EGD BIOPSY performed by Wang Jeffers MD at [address] [place] [address] 02/02/2021    EGD BIOPSY and spot marking performed by Be Hansen MD at [address] [place] [address] N/A 02/12/2021    ENDOSCOPIC ULTRASOUND, EGD performed by Joaquin Thomson MD at [facility] 1  02/12/2021    EGD DIAGNOSTIC ONLY performed by Joaquin Thomson MD at [facility] 1 08/31/2021    EGD BIOPSY performed by Be Hansen MD at [address] [place] [address] 01/21/2022    EGD BIOPSY performed by Be Hansen MD at [address] [place] [address] 04/15/2022    EGD ESOPHAGOGASTRODUODENOSCOPY performed by Wang Jeffers MD at [address] [place] [address] 06/06/2022    EGD BAND LIGATION performed by Benito Lennox, MD at [address] [place] [address] N/A 06/09/2022    EGD ESOPHAGOGASTRODUODENOSCOPY performed by Benito Lennox, MD at [address] [place] [address] 09/14/2022    EGD ESOPHAGOGASTRODUODENOSCOPY ENDOSCOPIC APC AT GE JUNCTION performed by Celine Carvalho MD at [address] [place] [address] N/A 10/19/2022    EGD BIOPSY performed by Phil Naranjo MD at [address] [place] [address] 10/31/2022    EGD with APC performed by Phil Naranjo MD at [address] [address]:    Current Outpatient Medications:     diphenhydrAMINE (BENADRYL) 25 MG tablet, Take 1 tablet by mouth every 6 hours as needed for Itching (Patient not taking: No sig reported), Disp: , Rfl:     pantoprazole (PROTONIX) 40 MG tablet, , Disp: , Rfl:     FEROSUL 325 (65 Fe) MG tablet, take 1 tablet by mouth twice a day, Disp: 60 tablet, Rfl: 5    nadolol (CORGARD) 20 MG tablet, take 1 tablet by mouth once daily, Disp: 30 tablet, Rfl: 2    midodrine (PROAMATINE) 5 MG tablet, Take 2 tablets by mouth in the morning and 2 tablets at noon and 2 tablets before bedtime. , Disp: 90 tablet, Rfl: 3    furosemide (LASIX) 20 MG tablet, Take 1 tablet by mouth 2 times daily, Disp: 60 tablet, Rfl: 3    spironolactone (ALDACTONE) 100 MG tablet, Take 1 tablet by mouth every evening, Disp: 30 tablet, Rfl: 1    ondansetron (ZOFRAN-ODT) 4 MG disintegrating tablet, dissolve 1 tablet by mouth every 8 hours if needed for nausea and vomiting (Patient not taking: No sig reported), Disp: 10 tablet, Rfl: 0    lactulose (CHRONULAC) 10 GM/15ML solution, take 30 milliliters by mouth three times a day, Disp: 473 mL, Rfl: 1    FLUoxetine (PROZAC) 20 MG capsule, Take 1 capsule by mouth daily, Disp: 30 capsule, Rfl: 3    atorvastatin (LIPITOR) 20 MG tablet, Take 1 tablet by mouth nightly, Disp: 30 tablet, Rfl: 3    ALLERGIES:   No Known Allergies    FAMILY HISTORY:       Problem Relation Age of Onset    Cancer Mother         pancreatic    Cancer Father         bone    Diabetes Sister     Asthma Brother          SOCIAL HISTORY:   Social History     Socioeconomic History    Marital status: Single     Spouse name: Not on file    Number of children: Not on file    Years of education: Not on file    Highest education level: Not on file   Occupational History    Not on file   Tobacco Use    Smoking status: Former     Packs/day: 1.00     Years: 45.00     Pack years: 45.00     Types: Cigarettes     Quit date: 2017     Years since quittin.8    Smokeless tobacco: Never   Vaping Use    Vaping Use: Never used   Substance and Sexual Activity    Alcohol use: Not Currently     Comment: Quit alcohol in 2019- heavier drinking prior to quitting    Drug use: Not Currently     Frequency: 1.0 times per week     Types: Cocaine     Comment: Cocaine- stopped spring 2016    Sexual activity: Yes     Partners: Female   Other Topics Concern    Not on file   Social History Narrative     in the past, retired     Social Determinants of Health     Financial Resource Strain: Low Risk     Difficulty of Paying Living Expenses: Not hard at all   Food Insecurity: No Food Insecurity    Worried About [address] in the Last Year: Never true    [address] in the Last Year: Never true   Transportation Needs: Not on file   Physical Activity: Inactive    Days of Exercise per Week: 0 days    Minutes of Exercise per Session: 0 min   Stress: Not on file   Social Connections: Not on file   Intimate Partner Violence: Not on file   Housing Stability: Not on file       REVIEW OF SYSTEMS: A 12-point review of systemswas obtained and pertinent positives and negatives were enumerated above in the history of present illness. All other reviewed systems / symptoms were negative. Review of Systems   Constitutional: Negative. Negative for appetite change, fatigue and unexpected weight change. HENT: Negative. Negative for trouble swallowing. Eyes:  Positive for visual disturbance (glasses ).    Respiratory:  Positive for shortness of breath. Negative for cough, choking and wheezing. Cardiovascular: Negative. Negative for chest pain, palpitations and leg swelling. Gastrointestinal:  Positive for abdominal distention. Negative for anal bleeding, constipation, diarrhea, rectal pain and vomiting. Endocrine: Negative. Genitourinary: Negative. Negative for difficulty urinating. Musculoskeletal:  Positive for back pain. Skin:  Positive for color change. Allergic/Immunologic: Negative. Negative for environmental allergies and food allergies. Neurological: Negative. Negative for dizziness, weakness, light-headedness, numbness and headaches. Hematological:  Bruises/bleeds easily. Psychiatric/Behavioral: Negative. Negative for sleep disturbance. The patient is not nervous/anxious. PHYSICAL EXAMINATION: Vital signs reviewed per the nursing documentation. There were no vitals taken for this visit. There is no height or weight on file to calculate BMI. Physical Exam  Constitutional:       Appearance: Normal appearance. Eyes:      General: No scleral icterus. Pupils: Pupils are equal, round, and reactive to light. Cardiovascular:      Rate and Rhythm: Normal rate and regular rhythm. Heart sounds: Normal heart sounds. Pulmonary:      Effort: Pulmonary effort is normal.      Breath sounds: Decreased air movement present. Examination of the right-lower field reveals decreased breath sounds. Examination of the left-lower field reveals decreased breath sounds. Decreased breath sounds present. Abdominal:      General: Bowel sounds are normal. There is no distension. Palpations: Abdomen is soft. There is no mass. Tenderness: There is no abdominal tenderness. There is no guarding. Skin:     General: Skin is warm and dry. Coloration: Skin is not jaundiced. Neurological:      Mental Status: He is alert and oriented to person, place, and time. Mental status is at baseline. LABORATORY DATA: Reviewed  Lab Results   Component Value Date    WBC 4.1 12/07/2022    HGB 8.3 (L) 12/07/2022    HCT 26.8 (L) 12/07/2022    MCV 80.4 12/07/2022     (L) 12/07/2022     12/07/2022    K 4.6 12/07/2022     12/07/2022    CO2 24 12/07/2022    BUN 5 (L) 12/07/2022    CREATININE 0.60 (L) 12/07/2022    LABPROT 7.7 04/19/2012    LABALBU 3.0 (L) 12/07/2022    BILITOT 1.1 12/07/2022    ALKPHOS 179 (H) 12/07/2022    AST 52 (H) 12/07/2022    ALT 29 12/07/2022    INR 1.4 12/02/2022         Lab Results   Component Value Date    RBC 3.33 (L) 12/07/2022    HGB 8.3 (L) 12/07/2022    MCV 80.4 12/07/2022    MCH 24.8 (L) 12/07/2022    MCHC 30.8 (L) 12/07/2022    RDW 17.2 (H) 12/07/2022    MPV 6.8 12/07/2022    BASOPCT 0 12/07/2022    LYMPHSABS 0.53 (L) 12/07/2022    MONOSABS 0.37 12/07/2022    NEUTROABS 3.20 12/07/2022    EOSABS 0.00 12/07/2022    BASOSABS 0.00 12/07/2022         DIAGNOSTIC TESTING:     IR TIPS INSERTION    Result Date: 12/1/2022  PROCEDURE: VR INSERTION OF TIPS MODERATE CONSCIOUS SEDATION 12/1/2022 HISTORY: ORDERING SYSTEM PROVIDED HISTORY: Portal hypertension ([address] CONTRAST: 21 mL Isovue 370 SEDATION: General anesthesia provided by anesthesia department. FLUOROSCOPY DOSE AND TYPE OR TIME AND EXPOSURES: 10.9 minutes, DAP 2247 9.54 micro gray meter squared DESCRIPTION OF PROCEDURE: Informed consent was obtained after a detailed explanation of the procedure including risks, benefits, and alternatives. Universal protocol was observed. Sterile gowns, masks, hats and gloves utilized for maximal sterile barrier. Patient was placed supine on the table. Right neck was prepped and draped sterile fashion. Ultrasound used to localize and confirmed patency of the right internal jugular vein. 1% lidocaine was infiltrated for local anesthesia a stab incision was made. Micropuncture access was obtained of the internal jugular vein under ultrasound guidance.   A 0.35 guidewire was advanced into the IVC under fluoroscopic guidance. Over wire dilator 10 Korean sheath was inserted down to the level of the liver. And angle catheter was then used to select the right hepatic vein. The catheter was advanced out distally and wedged. A CO2 portogram was then performed. Next the sheath was advanced into the right hepatic vein. Catheter wire was removed. Tips cannula and needle was then advanced into the right hepatic vein. Single puncture was made needle was removed and the catheter was slowly withdrawn while aspirating. Blood was returned contrast was injected confirming catheter tip in the left portal vein. Next 0.35 Amplatz wire was advanced into the portal vein. Tips cannula and catheter removed. Next over wire a measuring pigtail catheter was advanced and placed in the portal vein. Portal venogram and hepatic venogram was performed at the same time. 5 cm parenchymal tract was measured. The introducer was placed back in the sheath the sheath was then advanced through the a [place] parenchyma from hepatic vein to portal vein. Next through the sheath a 5 cm x 10 mm via tore stent graft was then deployed. A 9 mm balloon was then advanced into the graft and angioplasty was performed opening the graft and 9 mm. Balloon was removed and a pigtail catheter was advanced again over wire into the portal vein. A completion portal venogram was performed showing widely patent shunt with good flow. All wires catheters sheath were then removed and hemostasis was achieved by manual compression on the puncture site. FINDINGS: Patent right hepatic vein. Patent right internal jugular vein. Patent portal vein. Widely patent tips shunt from right hepatic vein to left portal vein with preferential flow through it. Successful placement of tips shunt 5 cm via tore graft from right hepatic vein to left portal vein with good flow.      IR US GUIDED PARACENTESIS    Result Date: 11/27/2022  PROCEDURE: PARACENTESIS WITH IMAGE GUIDANCE US ABDOMEN LIMITED 11/25/2022 HISTORY: ORDERING SYSTEM PROVIDED HISTORY: Alcoholic cirrhosis of liver with ascites ([address] TECHNOLOGIST PROVIDED HISTORY: Weekly due o amount drained every 2 weeks TECHNIQUE: Informed consent was obtained after a detailed explanation of the procedure including risks, benefits, and alternatives. Universal protocol was followed. A limited ultrasound of the abdomen was performed. The right abdomen was prepped and draped in sterile fashion and local anesthesia was achieved with lidocaine. An 5 Liberian needle sheath was advanced into ascites and paracentesis was performed. The patient tolerated the procedure well. FINDINGS: Limited ultrasound of the abdomen demonstrates ascites. A total of 0.1 L was removed. Successful ultrasound-guided therapeutic paracentesis. IR US GUIDED PARACENTESIS    Result Date: 11/18/2022  PROCEDURE: PARACENTESIS WITHOUT IMAGE GUIDANCE US ABDOMEN LIMITED 11/18/2022 HISTORY: ORDERING SYSTEM PROVIDED HISTORY: Alcoholic cirrhosis of liver with ascites ([address] TECHNOLOGIST PROVIDED HISTORY: Weekly due o amount drained every 2 weeks TECHNIQUE: Informed consent was obtained after a explanation of the procedure including risks. Universal protocol was followed. A limited ultrasound of the abdomen was performed. The right abdomen was prepped and draped in sterile fashion, and local anesthesia was achieved with 1% lidocaine. A 5 Liberian needle sheath was advanced into the ascites under direct ultrasound guidance (a sterile probe cover and gel were used), and paracentesis was performed. A total of 9.9 L of cloudy yellow fluid was aspirated. The patient tolerated the procedure well without immediately apparent complication. FINDINGS: Initial limited abdominal ultrasound demonstrated a large amount of ascites. Successful paracentesis. IV albumin replacement therapy was initiated.  a     IR US GUIDED PARACENTESIS    Result Date: 11/11/2022  PROCEDURE: PARACENTESIS WITH IMAGE GUIDANCE US ABDOMEN LIMITED 11/11/2022 HISTORY: ORDERING SYSTEM PROVIDED HISTORY: Alcoholic cirrhosis of liver with ascites ([address] TECHNOLOGIST PROVIDED HISTORY: Weekly due o amount drained every 2 weeks TECHNIQUE: Informed consent was obtained after a detailed explanation of the procedure including risks, benefits, and alternatives. Universal protocol was followed. A limited ultrasound of the abdomen was performed and demonstrates a large amount of ascites. The right abdomen was prepped and draped in sterile fashion and local anesthesia was achieved with lidocaine. A 5 Brazilian needle sheath was advanced into ascites using realtime ultrasound guidance and paracentesis was performed. The patient tolerated the procedure well. EBL: None FINDINGS: Limited ultrasound of the abdomen demonstrates ascites. A total of 11.2 L of slightly turbid yellow colored fluid was removed. Supplemental albumin was given intravenously. Successful ultrasound-guided paracentesis. IMPRESSION: Mr. Arnaud Solis is a 60 y.o. male with    Diagnosis Orders   1. Shortness of breath  XR CHEST 1 VIEW      2. Alcoholic cirrhosis of liver with ascites ([address]        3. Portal hypertensive gastropathy ([address]        4. Anemia, unspecified type        5. 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 Lezama's esophagus with high grade dysplasia          S/p TIPS  Labs reviewed  Hgb stable  BMP satisfactory    W/ SOB today, decreased breath sounds bilaterally  Will send for Chest XR now. Pending this will make further recommendations. Had Echo last year without any concern for aortic stenosis, diastolic dysfunction    Planned for paracentesis tomorrow  No tense ascites     Labs and RTO next few weeks    Thank you for allowing me to participate in the care of Mr. Arnaud Solis. For any further questions please do not hesitate to contact me. I have reviewed and agree with the ROS entered by the MA/LPN. ANNA Morejon    [facility]  Office #: [phone]

## 2022-12-09 ENCOUNTER — HOSPITAL ENCOUNTER (OUTPATIENT)
Dept: INTERVENTIONAL RADIOLOGY/VASCULAR | Age: 62
End: 2022-12-09
Payer: MEDICARE

## 2022-12-09 ENCOUNTER — HOSPITAL ENCOUNTER (EMERGENCY)
Age: 62
Discharge: HOME | End: 2022-12-09
Attending: EMERGENCY MEDICINE
Payer: MEDICARE

## 2022-12-09 ENCOUNTER — APPOINTMENT (OUTPATIENT)
Dept: INTERVENTIONAL RADIOLOGY/VASCULAR | Age: 62
End: 2022-12-09
Payer: MEDICARE

## 2022-12-09 ENCOUNTER — APPOINTMENT (OUTPATIENT)
Dept: GENERAL RADIOLOGY | Age: 62
End: 2022-12-09
Payer: MEDICARE

## 2022-12-09 VITALS
SYSTOLIC BLOOD PRESSURE: 138 MMHG | HEART RATE: 100 BPM | OXYGEN SATURATION: 97 % | DIASTOLIC BLOOD PRESSURE: 78 MMHG | TEMPERATURE: 98.4 F

## 2022-12-09 VITALS
WEIGHT: 197 LBS | SYSTOLIC BLOOD PRESSURE: 136 MMHG | OXYGEN SATURATION: 98 % | BODY MASS INDEX: 28.2 KG/M2 | HEART RATE: 93 BPM | RESPIRATION RATE: 18 BRPM | TEMPERATURE: 97.4 F | DIASTOLIC BLOOD PRESSURE: 62 MMHG | HEIGHT: 70 IN

## 2022-12-09 DIAGNOSIS — R18.8 ASCITES OF LIVER: Primary | ICD-10-CM

## 2022-12-09 LAB
ABSOLUTE EOS #: 0 K/UL (ref 0–0.4)
ABSOLUTE LYMPH #: 0.49 K/UL (ref 1–4.8)
ABSOLUTE MONO #: 0.74 K/UL (ref 0.1–1.3)
ALBUMIN FLUID: 1.1 G/DL
ALBUMIN SERPL-MCNC: 2.8 G/DL (ref 3.5–5.2)
ALP BLD-CCNC: 187 U/L (ref 40–129)
ALT SERPL-CCNC: 23 U/L (ref 5–41)
ANION GAP SERPL CALCULATED.3IONS-SCNC: 9 MMOL/L (ref 9–17)
APPEARANCE FLUID: NORMAL
APPEARANCE FLUID: NORMAL
AST SERPL-CCNC: 43 U/L
BASO FLUID: ABNORMAL %
BASO FLUID: ABNORMAL %
BASOPHILS # BLD: 1 % (ref 0–2)
BASOPHILS ABSOLUTE: 0.05 K/UL (ref 0–0.2)
BILIRUB SERPL-MCNC: 1.9 MG/DL (ref 0.3–1.2)
BILIRUBIN DIRECT: 0.6 MG/DL
BILIRUBIN, INDIRECT: 1.3 MG/DL (ref 0–1)
BUN BLDV-MCNC: 4 MG/DL (ref 8–23)
CALCIUM SERPL-MCNC: 8.2 MG/DL (ref 8.6–10.4)
CHLORIDE BLD-SCNC: 105 MMOL/L (ref 98–107)
CO2: 22 MMOL/L (ref 20–31)
COLOR FLUID: NORMAL
COLOR FLUID: YELLOW
CREAT SERPL-MCNC: 0.4 MG/DL (ref 0.7–1.2)
EOSINOPHIL FLUID: ABNORMAL %
EOSINOPHIL FLUID: ABNORMAL %
EOSINOPHILS RELATIVE PERCENT: 0 % (ref 0–4)
FLUID DIFF COMMENT: ABNORMAL
FLUID DIFF COMMENT: ABNORMAL
GFR SERPL CREATININE-BSD FRML MDRD: >60 ML/MIN/1.73M2
GLUCOSE BLD-MCNC: 92 MG/DL (ref 70–99)
HCT VFR BLD CALC: 26.1 % (ref 41–53)
HEMOGLOBIN: 7.7 G/DL (ref 13.5–17.5)
INR BLD: 1.4
LACTATE DEHYDROGENASE, FLUID: 52 U/L
LYMPHOCYTES # BLD: 10 % (ref 24–44)
LYMPHOCYTES, BODY FLUID: 21 %
LYMPHOCYTES, BODY FLUID: 40 %
MCH RBC QN AUTO: 23.4 PG (ref 26–34)
MCHC RBC AUTO-ENTMCNC: 29.4 G/DL (ref 31–37)
MCV RBC AUTO: 79.5 FL (ref 80–100)
MONOCYTE, FLUID: ABNORMAL %
MONOCYTE, FLUID: ABNORMAL %
MONOCYTES # BLD: 15 % (ref 1–7)
MORPHOLOGY: ABNORMAL
NEUTROPHIL, FLUID: 1 %
NEUTROPHIL, FLUID: 21 %
OTHER CELLS FLUID: 39 %
OTHER CELLS FLUID: 78 %
PARTIAL THROMBOPLASTIN TIME: 58.7 SEC (ref 24–36)
PDW BLD-RTO: 17.3 % (ref 11.5–14.9)
PH FLUID: 8
PLATELET # BLD: 121 K/UL (ref 150–450)
PMV BLD AUTO: 6.9 FL (ref 6–12)
POTASSIUM SERPL-SCNC: 3.9 MMOL/L (ref 3.7–5.3)
PROTHROMBIN TIME: 16.9 SEC (ref 11.8–14.6)
RBC # BLD: 3.28 M/UL (ref 4.5–5.9)
RBC FLUID: 547 /MM3
RBC FLUID: NORMAL /MM3
SEG NEUTROPHILS: 74 % (ref 36–66)
SEGMENTED NEUTROPHILS ABSOLUTE COUNT: 3.62 K/UL (ref 1.3–9.1)
SODIUM BLD-SCNC: 136 MMOL/L (ref 135–144)
SPECIMEN TYPE: NORMAL
TOTAL PROTEIN, BODY FLUID: 1.7 G/DL
TOTAL PROTEIN: 5.3 G/DL (ref 6.4–8.3)
TROPONIN, HIGH SENSITIVITY: 15 NG/L (ref 0–22)
WBC # BLD: 4.9 K/UL (ref 3.5–11)
WBC FLUID: 244 /MM3
WBC FLUID: 331 /MM3

## 2022-12-09 PROCEDURE — 87206 SMEAR FLUORESCENT/ACID STAI: CPT

## 2022-12-09 PROCEDURE — 87075 CULTR BACTERIA EXCEPT BLOOD: CPT

## 2022-12-09 PROCEDURE — 82042 OTHER SOURCE ALBUMIN QUAN EA: CPT

## 2022-12-09 PROCEDURE — 88305 TISSUE EXAM BY PATHOLOGIST: CPT

## 2022-12-09 PROCEDURE — 83615 LACTATE (LD) (LDH) ENZYME: CPT

## 2022-12-09 PROCEDURE — 96374 THER/PROPH/DIAG INJ IV PUSH: CPT

## 2022-12-09 PROCEDURE — 2709999900 IR US GUIDED PARACENTESIS

## 2022-12-09 PROCEDURE — 99285 EMERGENCY DEPT VISIT HI MDM: CPT

## 2022-12-09 PROCEDURE — 49083 ABD PARACENTESIS W/IMAGING: CPT

## 2022-12-09 PROCEDURE — 87116 MYCOBACTERIA CULTURE: CPT

## 2022-12-09 PROCEDURE — 87070 CULTURE OTHR SPECIMN AEROBIC: CPT

## 2022-12-09 PROCEDURE — 6360000002 HC RX W HCPCS: Performed by: STUDENT IN AN ORGANIZED HEALTH CARE EDUCATION/TRAINING PROGRAM

## 2022-12-09 PROCEDURE — 85730 THROMBOPLASTIN TIME PARTIAL: CPT

## 2022-12-09 PROCEDURE — 88112 CYTOPATH CELL ENHANCE TECH: CPT

## 2022-12-09 PROCEDURE — 87205 SMEAR GRAM STAIN: CPT

## 2022-12-09 PROCEDURE — 71046 X-RAY EXAM CHEST 2 VIEWS: CPT

## 2022-12-09 PROCEDURE — 2709999900 IR GUIDED THORACENTESIS PLEURAL

## 2022-12-09 PROCEDURE — 87015 SPECIMEN INFECT AGNT CONCNTJ: CPT

## 2022-12-09 PROCEDURE — 84484 ASSAY OF TROPONIN QUANT: CPT

## 2022-12-09 PROCEDURE — 80076 HEPATIC FUNCTION PANEL: CPT

## 2022-12-09 PROCEDURE — 32555 ASPIRATE PLEURA W/ IMAGING: CPT

## 2022-12-09 PROCEDURE — 85610 PROTHROMBIN TIME: CPT

## 2022-12-09 PROCEDURE — 80048 BASIC METABOLIC PNL TOTAL CA: CPT

## 2022-12-09 PROCEDURE — 87102 FUNGUS ISOLATION CULTURE: CPT

## 2022-12-09 PROCEDURE — 93005 ELECTROCARDIOGRAM TRACING: CPT

## 2022-12-09 PROCEDURE — 36415 COLL VENOUS BLD VENIPUNCTURE: CPT

## 2022-12-09 PROCEDURE — 83986 ASSAY PH BODY FLUID NOS: CPT

## 2022-12-09 PROCEDURE — 6360000002 HC RX W HCPCS: Performed by: EMERGENCY MEDICINE

## 2022-12-09 PROCEDURE — 84157 ASSAY OF PROTEIN OTHER: CPT

## 2022-12-09 PROCEDURE — 85025 COMPLETE CBC W/AUTO DIFF WBC: CPT

## 2022-12-09 RX ORDER — HEPARIN SODIUM (PORCINE) LOCK FLUSH IV SOLN 100 UNIT/ML 100 UNIT/ML
300 SOLUTION INTRAVENOUS ONCE
Status: COMPLETED | OUTPATIENT
Start: 2022-12-09 | End: 2022-12-09

## 2022-12-09 RX ORDER — ONDANSETRON 2 MG/ML
4 INJECTION INTRAMUSCULAR; INTRAVENOUS ONCE
Status: COMPLETED | OUTPATIENT
Start: 2022-12-09 | End: 2022-12-09

## 2022-12-09 RX ADMIN — ONDANSETRON 4 MG: 2 INJECTION INTRAMUSCULAR; INTRAVENOUS at 15:55

## 2022-12-09 RX ADMIN — HEPARIN 300 UNITS: 100 SYRINGE at 20:04

## 2022-12-09 ASSESSMENT — ENCOUNTER SYMPTOMS
COUGH: 1
TROUBLE SWALLOWING: 0
RHINORRHEA: 0
WHEEZING: 1
SORE THROAT: 0
SHORTNESS OF BREATH: 1

## 2022-12-09 ASSESSMENT — PAIN - FUNCTIONAL ASSESSMENT: PAIN_FUNCTIONAL_ASSESSMENT: 0-10

## 2022-12-09 ASSESSMENT — PAIN SCALES - GENERAL: PAINLEVEL_OUTOF10: 0

## 2022-12-09 NOTE — ED PROVIDER NOTES
[address]  Emergency Department Encounter  EmergencyMedicine Resident     Pt Name:Frank Simpson  MRN: [id number]  [place] 1960  Date of evaluation: 12/9/22  PCP:  VASU Browning    This patient was evaluated in the Emergency Department for symptoms described in the history of present illness. The patient was evaluated in the context of the global COVID-19 pandemic, which necessitated consideration that the patient might be at risk for infection with the SARS-CoV-2 virus that causes COVID-19. Institutional protocols and algorithms that pertain to the evaluation of patients at risk for COVID-19 are in a state of rapid change based on information released by regulatory bodies including the CDC and federal and state organizations. These policies and algorithms were followed during the patient's care in the ED. CHIEF COMPLAINT       Chief Complaint   Patient presents with    Shortness of Breath       HISTORY OF PRESENT ILLNESS  (Location/Symptom, Timing/Onset, Context/Setting, Quality, Duration, Modifying Factors, Severity.)      Sandy Alfaro is a 60 y.o. male who presents with shortness of breath. Patient has a history of hepatitis C, recurrent ascites. Patient was sent here from gastroenterology apparently during his clinic visit. Patient was supposed to have a paracentesis/thoracentesis however he was determined to be unstable due to being somewhat short of breath. His O2 sat is greater than 96% on room air, he is not tachycardic, blood pressure was marginally hypertensive at 147/68 in the ED today. Patient is afebrile at 97.4. He is not have any acute abdominal tenderness, he does not have any other concerns or complaints today. Patient does have some mild to moderate abdominal distention that he states is much better than his usual state, he is not having any low back pain, no fevers chills, no nausea or vomiting. He is not having any chest pain currently.   He is unable to lay flat completely due to worsening shortness of breath. Patient is otherwise not in any acute distress. 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 ([address] Alcoholic cirrhosis of liver with ascites ([address] Anemia, Anxiety, Arthritis, Back pain, chronic, [place] esophagus, BPH (benign prostatic hypertrophy), Cholelithiasis, Cirrhosis ([address] COVID-19, COVID-19 vaccine series completed, DDD (degenerative disc disease), lumbar, Depression, Esophageal cancer ([address] Esophageal varices ([address] Fatty liver, GERD (gastroesophageal reflux disease), GI bleed, Hep C w/o coma, chronic ([address] History of alcohol abuse, History of blood transfusion, History of colon polyps, History of tobacco abuse, [place] (hard of hearing), Hyperlipidemia, Hypertension, Hyponatremia, Hypotension, PONV (postoperative nausea and vomiting), Port-A-Cath in place, Portal hypertension ([address] Sciatica, Secondary esophageal varices ([address] Shortness of breath, Spinal stenosis, Stomach ulcer, Thrombocytopenia ([address] Tubular adenoma of colon, Vitamin D deficiency, and Wears glasses. has a past surgical history that includes Bunionectomy; Nasal septum surgery; other surgical history (01/04/2016); Colonoscopy; Colonoscopy (10/05/2016); other surgical history (11/21/2016); other surgical history (12/19/2016); knee surgery (Left); Bunionectomy (Left); Endoscopy, colon, diagnostic; pr revise median n/carpal tunnel surg (Right, 08/29/2017); Carpal tunnel release (Right); pr revise median n/carpal tunnel surg (Left, 10/31/2017); Colonoscopy (N/A, 03/30/2018); Colonoscopy (03/30/2018); pr njx aa&/strd tfrml epi lumbar/sacral 1 level (Bilateral, 09/06/2018); other surgical history (09/28/2018); pr njx aa&/strd tfrml epi lumbar/sacral 1 level (N/A, 09/28/2018); Pain management procedure (Left, 07/09/2020); Pain management procedure (Left, 07/20/2020);  Pain management procedure (Bilateral, 2020); other surgical history (Right, 2020); Nerve Block (Right, 2020); Pain management procedure (Bilateral, 2020); Upper gastrointestinal endoscopy (N/A, 2020); Upper gastrointestinal endoscopy (N/A, 2021); Upper gastrointestinal endoscopy (N/A, 2021); Upper gastrointestinal endoscopy (2021); [place] tooth extraction; IR PORT PLACEMENT > 5 YEARS (2021); Upper gastrointestinal endoscopy (N/A, 2021); Upper gastrointestinal endoscopy (N/A, 2022); Upper gastrointestinal endoscopy (N/A, 04/15/2022); Colonoscopy (N/A, 2022); Upper gastrointestinal endoscopy (N/A, 2022); Upper gastrointestinal endoscopy (N/A, 2022); Paracentesis; Upper gastrointestinal endoscopy (N/A, 2022); Upper gastrointestinal endoscopy (N/A, 10/19/2022); Upper gastrointestinal endoscopy (N/A, 10/31/2022); and IR TIPS INSERTION (2022).       Social History     Socioeconomic History    Marital status: Single     Spouse name: Not on file    Number of children: Not on file    Years of education: Not on file    Highest education level: Not on file   Occupational History    Not on file   Tobacco Use    Smoking status: Former     Packs/day: 1.00     Years: 45.00     Pack years: 45.00     Types: Cigarettes     Quit date: 2017     Years since quittin.8    Smokeless tobacco: Never   Vaping Use    Vaping Use: Never used   Substance and Sexual Activity    Alcohol use: Not Currently     Comment: Quit alcohol in 2019- heavier drinking prior to quitting    Drug use: Not Currently     Frequency: 1.0 times per week     Types: Cocaine     Comment: Cocaine- stopped spring 2016    Sexual activity: Yes     Partners: Female   Other Topics Concern    Not on file   Social History Narrative     in the past, retired     Social Determinants of Health     Financial Resource Strain: Low Risk     Difficulty of Paying Living Expenses: Not hard at [facility] Insecurity: No Food Insecurity    Worried About Running Out of Food in the Last Year: Never true    Ran Out of Food in the Last Year: Never true   Transportation Needs: Not on file   Physical Activity: Inactive    Days of Exercise per Week: 0 days    Minutes of Exercise per Session: 0 min   Stress: Not on file   Social Connections: Not on file   Intimate Partner Violence: Not on file   Housing Stability: Not on file       Family History   Problem Relation Age of Onset    Cancer Mother         pancreatic    Cancer Father         bone    Diabetes Sister     Asthma Brother     Allergies Brother         MULTIPLE       Allergies:  Patient has no known allergies. Home Medications:  Prior to Admission medications    Medication Sig Start Date End Date Taking? Authorizing Provider   diphenhydrAMINE (BENADRYL) 25 MG tablet Take 1 tablet by mouth every 6 hours as needed for Itching  Patient not taking: No sig reported 12/2/22 1/1/23  Meagan COLE Blood, DO   pantoprazole (PROTONIX) 40 MG tablet  10/4/22   Historical Provider, MD   FEROSUL 325 (65 Fe) MG tablet take 1 tablet by mouth twice a day 10/7/22   VASU Gross   nadolol (CORGARD) 20 MG tablet take 1 tablet by mouth once daily 8/26/22   MASSIMO Paredes NP   midodrine (PROAMATINE) 5 MG tablet Take 2 tablets by mouth in the morning and 2 tablets at noon and 2 tablets before bedtime.  8/4/22   MASSIMO Paredes NP   furosemide (LASIX) 20 MG tablet Take 1 tablet by mouth 2 times daily 6/23/22   MASSIMO Paredes NP   spironolactone (ALDACTONE) 100 MG tablet Take 1 tablet by mouth every evening 6/23/22   MASSIMO Paredes NP   ondansetron (ZOFRAN-ODT) 4 MG disintegrating tablet dissolve 1 tablet by mouth every 8 hours if needed for nausea and vomiting  Patient not taking: No sig reported 5/31/22   VASU Gross   lactulose [facility]) 10 GM/15ML solution take 30 milliliters by mouth three times a day 5/31/22   Kelechi Frye VASU Julian   FLUoxetine (PROZAC) 20 MG capsule Take 1 capsule by mouth daily 4/21/22   Subhash Tejada MD   atorvastatin (LIPITOR) 20 MG tablet Take 1 tablet by mouth nightly 4/21/22   Subhash Tejada MD       REVIEW OF SYSTEMS    (2-9 systems for level 4, 10 or more for level 5)      Review of Systems   Constitutional:  Positive for fatigue. Negative for chills and fever. HENT:  Negative for congestion and trouble swallowing. Eyes:  Negative for visual disturbance. Respiratory:  Positive for shortness of breath. Negative for cough and chest tightness. Gastrointestinal:  Positive for abdominal distention. Negative for abdominal pain, diarrhea, nausea and vomiting. Endocrine: Negative for polyuria. Genitourinary:  Negative for dysuria, flank pain, hematuria and urgency. Musculoskeletal:  Negative for back pain and myalgias. Skin:  Negative for color change. Allergic/Immunologic: Negative for immunocompromised state. Neurological:  Negative for dizziness, syncope, weakness, light-headedness and headaches. PHYSICAL EXAM   (up to 7 for level 4, 8 or more for level 5)      INITIAL VITALS:   /62   Pulse 93   Temp 97.4 °F (36.3 °C)   Resp 18   Ht 5' 10\" (1.778 m)   Wt 197 lb (89.4 kg)   SpO2 98%   BMI 28.27 kg/m²     Physical Exam  Constitutional:       Appearance: He is well-developed. HENT:      Head: Normocephalic and atraumatic. Nose: Nose normal.      Mouth/Throat:      Mouth: Mucous membranes are moist.   Eyes:      Conjunctiva/sclera: Conjunctivae normal.   Cardiovascular:      Rate and Rhythm: Normal rate and regular rhythm. Heart sounds: Normal heart sounds. No murmur heard. No friction rub. Pulmonary:      Effort: Pulmonary effort is normal. No respiratory distress. Breath sounds: Examination of the right-lower field reveals rales. Examination of the left-lower field reveals rales. Rales present. Chest:      Chest wall: No tenderness.    Abdominal:      General: Abdomen is flat. Palpations: Abdomen is soft. There is no hepatomegaly, splenomegaly or pulsatile mass. Tenderness: There is no abdominal tenderness. Hernia: No hernia is present. Skin:     General: Skin is warm. Capillary Refill: Capillary refill takes less than 2 seconds. Neurological:      General: No focal deficit present. Mental Status: He is alert. Motor: No weakness. DIFFERENTIAL  DIAGNOSIS     PLAN (LABS / IMAGING / EKG):  Orders Placed This Encounter   Procedures    Culture, Body Fluid    Culture with Smear, Acid Fast Bacillius    Culture, Fungus    Culture with Smear, Acid Fast Bacillius    Culture, Fungus    Culture, Body Fluid    XR CHEST (2 VW)    IR US GUIDED PARACENTESIS    IR GUIDED THORACENTESIS PLEURAL    CBC with Auto Differential    Basic Metabolic Panel    Hepatic Function Panel    Protime-INR    APTT    Body fluid cell count    Albumin, Body Fluid    Troponin    Cell Count with Differential, Body Fluid    Cytology, Non-Gyn    Lactate Dehydrogenase, Body Fluid    pH, Body Fluid    Protein, Body Fluid    Lactate Dehydrogenase, Body Fluid    pH, Body Fluid    Protein, Body Fluid    Cytology, Non-Gyn    Body fluid cell count    Albumin, Body Fluid    Differential, Body Fluid    Differential, Body Fluid    Vascular access to device site    EKG 12 Lead       MEDICATIONS ORDERED:  Orders Placed This Encounter   Medications    ondansetron (ZOFRAN) injection 4 mg    heparin flush 100 UNIT/ML injection 300 Units           DIAGNOSTIC RESULTS / EMERGENCY DEPARTMENT COURSE / MDM   LAB RESULTS:  Results for orders placed or performed during the hospital encounter of 12/09/22   Culture, Body Fluid    Specimen: Paracentesis   Result Value Ref Range    Specimen Description . PARACENTESIS FLUID     Direct Exam MANY NEUTROPHILS (A)     Direct Exam NO ORGANISMS SEEN     Direct Exam       Gram stain made from cytocentrifuged specimen. Organisms and cells will be concentrated. Culture PENDING    CBC with Auto Differential   Result Value Ref Range    WBC 4.9 3.5 - 11.0 k/uL    RBC 3.28 (L) 4.5 - 5.9 m/uL    Hemoglobin 7.7 (L) 13.5 - 17.5 g/dL    Hematocrit 26.1 (L) 41 - 53 %    MCV 79.5 (L) 80 - 100 fL    MCH 23.4 (L) 26 - 34 pg    MCHC 29.4 (L) 31 - 37 g/dL    RDW 17.3 (H) 11.5 - 14.9 %    Platelets 673 (L) 023 - 450 k/uL    MPV 6.9 6.0 - 12.0 fL    Seg Neutrophils 74 (H) 36 - 66 %    Lymphocytes 10 (L) 24 - 44 %    Monocytes 15 (H) 1 - 7 %    Eosinophils % 0 0 - 4 %    Basophils 1 0 - 2 %    Segs Absolute 3.62 1.3 - 9.1 k/uL    Absolute Lymph # 0.49 (L) 1.0 - 4.8 k/uL    Absolute Mono # 0.74 0.1 - 1.3 k/uL    Absolute Eos # 0.00 0.0 - 0.4 k/uL    Basophils Absolute 0.05 0.0 - 0.2 k/uL    Morphology ANISOCYTOSIS PRESENT     Morphology HYPOCHROMIA PRESENT     Morphology 1+ TEARDROPS    Basic Metabolic Panel   Result Value Ref Range    Glucose 92 70 - 99 mg/dL    BUN 4 (L) 8 - 23 mg/dL    Creatinine 0.40 (L) 0.70 - 1.20 mg/dL    Est, Glom Filt Rate >60 >60 mL/min/1.73m2    Calcium 8.2 (L) 8.6 - 10.4 mg/dL    Sodium 136 135 - 144 mmol/L    Potassium 3.9 3.7 - 5.3 mmol/L    Chloride 105 98 - 107 mmol/L    CO2 22 20 - 31 mmol/L    Anion Gap 9 9 - 17 mmol/L   Hepatic Function Panel   Result Value Ref Range    Albumin 2.8 (L) 3.5 - 5.2 g/dL    Alkaline Phosphatase 187 (H) 40 - 129 U/L    ALT 23 5 - 41 U/L    AST 43 (H) <40 U/L    Total Bilirubin 1.9 (H) 0.3 - 1.2 mg/dL    Bilirubin, Direct 0.6 (H) <0.3 mg/dL    Bilirubin, Indirect 1.3 (H) 0.0 - 1.0 mg/dL    Total Protein 5.3 (L) 6.4 - 8.3 g/dL   Protime-INR   Result Value Ref Range    Protime 16.9 (H) 11.8 - 14.6 sec    INR 1.4    APTT   Result Value Ref Range    PTT 58.7 (H) 24.0 - 36.0 sec   Body fluid cell count   Result Value Ref Range    Color, Fluid YELLOW     Appearance, Fluid CLOUDY     WBC, Fluid 244 /mm3    RBC, Fluid 547 /mm3    Specimen Type . THORACENTESIS FLUID    Albumin, Body Fluid   Result Value Ref Range    Specimen Type Benson Patella THORACENTESIS FLUID     Albumin, Fluid 1.0 g/dL   Troponin   Result Value Ref Range    Troponin, High Sensitivity 15 0 - 22 ng/L   Lactate Dehydrogenase, Body Fluid   Result Value Ref Range    Specimen Type . THORACENTESIS FLUID     LD, Fluid 52 U/L   pH, Body Fluid   Result Value Ref Range    Specimen Type . THORACENTESIS FLUID     pH, Fluid 8.0    Protein, Body Fluid   Result Value Ref Range    Specimen Type . THORACENTESIS FLUID     Total Protein, Body Fluid 1.7 g/dL   Body fluid cell count   Result Value Ref Range    Color, Fluid RED     Appearance, Fluid CLOUDY     WBC, Fluid 331 /mm3    RBC, Fluid 11,262 /mm3    Specimen Type . PARACENTESIS FLUID    Albumin, Body Fluid   Result Value Ref Range    Specimen Type . PARACENTESIS FLUID     Albumin, Fluid 1.1 g/dL   Differential, Body Fluid   Result Value Ref Range    Fluid Diff Comment PENDING     Neutrophil Count, Fluid 1 (H) 0 %    Lymphocytes, Body Fluid 21 (H) 0 %    Monocyte Count, Fluid      0 %    Eos, Fluid      0 %    Basos, Fluid      0 %    Other Cells, Fluid 78 (H) 0 %   Differential, Body Fluid   Result Value Ref Range    Fluid Diff Comment PENDING     Neutrophil Count, Fluid 21 (H) 0 %    Lymphocytes, Body Fluid 40 (H) 0 %    Monocyte Count, Fluid      0 %    Eos, Fluid      0 %    Basos, Fluid      0 %    Other Cells, Fluid 39 (H) 0 %         RADIOLOGY:  XR CHEST (SINGLE VIEW FRONTAL)    Result Date: 12/9/2022  1. Small left pleural effusion with some subsegmental atelectasis. 2. Trace right pleural effusion. XR CHEST (2 VW)    Result Date: 12/9/2022  Small-moderate bilateral pleural effusions and bilateral lower lobe compressive atelectasis. IR TIPS INSERTION    Result Date: 12/1/2022  Successful placement of tips shunt 5 cm via tore graft from right hepatic vein to left portal vein with good flow.      IR US GUIDED PARACENTESIS    Result Date: 12/9/2022  Successful paracentesis     IR US GUIDED PARACENTESIS    Addendum Date: 12/8/2022 ADDENDUM: Addendum is to correct the amount ascites drained. 7.1 L was removed. Result Date: 12/8/2022  Successful ultrasound-guided therapeutic paracentesis. IR US GUIDED PARACENTESIS    Result Date: 11/18/2022  Successful paracentesis. IV albumin replacement therapy was initiated. a     IR US GUIDED PARACENTESIS    Result Date: 11/11/2022  Successful ultrasound-guided paracentesis. IR GUIDED THORACENTESIS PLEURAL    Result Date: 12/9/2022  Successful ultrasound guided left thoracentesis. EMERGENCY DEPARTMENT COURSE:  ED Course as of 12/10/22 0100   Fri Dec 09, 2022   1740 Back from IR, taken 1L off abdomen and 1L off chest [KK]   1908 Patient states that he still feels somewhat weak after paracentesis/Thoracentesis. No evidence of infection on body fluid cell count. Pending cultures. Patient is tolerating p.o. intake. Hemodynamically stable vitals within normal limits appropriate for discharge. Advised to follow-up within 3 days with primary care. [address]      ED Course User Index  301 Emil Baum DO       CONSULTS:  None              FINAL IMPRESSION      1.  Ascites of liver          DISPOSITION / PLAN     DISPOSITION Decision To Discharge 12/09/2022 07:54:37 PM      PATIENT REFERRED TO:  Warden Haro, [address] [place] [address] [phone]    In 3 days      [facility] ED  [facility] [address]  [address] [phone]    If symptoms worsen    DISCHARGE MEDICATIONS:  Discharge Medication List as of 12/9/2022  7:55 PM          Paresh Nieto DO  Emergency Medicine Resident    (Please note that portions of thisnote were completed with a voice recognition program.  Efforts were made to edit the dictations but occasionally words are mis-transcribed.)        Rommel Coronado DO  Resident  12/10/22 0100

## 2022-12-09 NOTE — BRIEF OP NOTE
Brief Postoperative Note    Genevieve Harrison  YOB: 1960  [id number]    Pre-operative Diagnosis: Ascites    Post-operative Diagnosis: Same    Procedure: Paracentesis    Anesthesia: Local    Surgeons/Assistants: Nikia Day    Estimated Blood Loss: less than 50     Complications: None    Specimens: Was Obtained. Findings: U/S guided right paracentesis yielding 1.9 L cloudy stephanie fluid.      Electronically signed by Quique Carias MD on 12/9/2022 at 5:34 PM

## 2022-12-09 NOTE — BRIEF OP NOTE
Brief Postoperative Note    Yaneth Akins  YOB: 1960  [id number]    Pre-operative Diagnosis: Bilateral pleural effusions    Post-operative Diagnosis: Same    Procedure: Thoracentesis    Anesthesia: Local    Surgeons/Assistants: Dominique Salas    Estimated Blood Loss: less than 50     Complications: None    Specimens: Was Obtained: 1025 ml cloudy yellow fluid    Findings: U/S guided left thoracentesis.       Electronically signed by Nicky Berry MD on 12/9/2022 at 4:29 PM

## 2022-12-09 NOTE — PROGRESS NOTES
Patient tolerated left thoracentesis without distress. 1025 ml of cloudy, yellow fluid removed. Dry dressing to site. Patient tolerated paracentesis without distress. 1900 ml of stephanie fluid removed. Dressing to site. Patient returned to room. Nurse updated.

## 2022-12-09 NOTE — H&P
HISTORY and [address]       NAME:  Sabina Ch  MRN: [id number]   YOB: 1960   Date: 12/9/2022   Age: 60 y.o. Gender: male     COMPLAINT AND PRESENT HISTORY:   Sabina Ch is 60 y.o.,  male, undergoing IR [address] for Alcoholic cirrhosis of liver with ascites per Dr. Navdeep Odom. HPI:  Patient w/hx of multiple paracentesis, hx of TIPS procedure 12-1-22 did f/u with GI yesterday:  SEE PORTION OF NOTE BELOW PER BRIGETTE Bentley, APRN-CNP, 12-8-22 (REVIEWED):  [id number] Complaint   Patient presents with    Cirrhosis        HISTORY OF PRESENT ILLNESS:      Patient seen for cirrhosis follow-up. Patient is s/p TIPS 12/1/22. Hgb stable, 8.3  No signs of encephalopathy. No signs of bleeding     Reports SOB over the past few days. No fever, chills, dizziness     He had Echo last year  EF 60%  L atrium mildly dilated  No significant valvular regurg. No aortic stenosis seen. S/p TIPS  Labs reviewed  Hgb stable  BMP satisfactory     W/ SOB today, decreased breath sounds bilaterally  Will send for Chest XR now. Pending this will make further recommendations. Had Echo last year without any concern for aortic stenosis, diastolic dysfunction     Planned for paracentesis tomorrow  No tense ascites      Labs and RTO next few weeks\"  Narrative   EXAMINATION:   ONE XRAY VIEW OF THE CHEST       12/8/2022 12:15 pm       COMPARISON:   Chest radiographs 09/12/2022, 04/26/2022       HISTORY:   ORDERING SYSTEM PROVIDED HISTORY: Shortness of breath       FINDINGS:   Lines/tubes: Right internal jugular approach chest port with tip projecting   over the expected position of the superior vena cava. Mediastinum: Calcific atherosclerosis of the thoracic aorta. No mediastinal   widening or shift. Partially visualized cardiac silhouette appears similar   to prior. Lungs/pleura: Small left and trace right pleural effusions.   Linear opacities   in the mid and lower left lung. No pneumothorax. Small density again seen   projecting over the mid left lung, possibly a calcified granuloma. Bones: No acute findings. Impression   1. Small left pleural effusion with some subsegmental atelectasis. 2. Trace right pleural effusion. PRE-PARACENTESIS QUESTIONNAIRE:   Last paracentesis date: 11-25-22. Amount removed: 7.3 L of yellow ascitic fluid per M. Geoffrey Robles RN's note on 11-25-22. Tolerated well: Yes. Any complications: Cramping of hands after. Albumin given: Yes. Interval between paracentesis dates: Weekly, every Friday. GI HX: See chart. Medications related to presenting condition: See chart. Taking as prescribed: States compliant. Shortness of breath: States SOB has been worsening recently- states about 3 days after TIPS procedure on 12-1-22 SOB started worsen. States that Tuesday s/s started worsen. He feels SOB constantly, worse w/laying down, + chest tightness, cannot catch his breath fully. SOB is worse w/exertion. Patient does state that plan was for possible thoracentesis during this appointment as well, but was not ordered d/t CXR was not read until patient arrived today (see result above). ABD distention: Denies. ABD pain: Upper ABD pain- more in the AM.   Nausea: + nausea. Vomiting: + vomiting out of recovery from TIPS. Constipation/diarrhea: Denies. Fever/chills: Denies. Recent unintended weight gain: Yes. Leg swelling: Denies, states left hand has been swollen for a couple of days now, worsening- + pain/stiffness, denies erythema/warmth. Jaundice/scleral icterus: Denies. Note: Patient did require a hospital admission to [facility] 10-29-22-11-1-22 r/t GI bleed. RECENT IMAGING R/T HPI   IR TIPS INSERTION    Result Date: 12/1/2022  Successful placement of tips shunt 5 cm via tore graft from right hepatic vein to left portal vein with good flow.      IR US GUIDED PARACENTESIS    Addendum Date: 12/8/2022    ADDENDUM: Addendum is to correct the amount ascites drained. 7.1 L was removed. Result Date: 12/8/2022  Successful ultrasound-guided therapeutic paracentesis. IR US GUIDED PARACENTESIS    Result Date: 11/18/2022  Successful paracentesis. IV albumin replacement therapy was initiated. a     IR US GUIDED PARACENTESIS    Result Date: 11/11/2022  Successful ultrasound-guided paracentesis. Review of additional significant medical hx (see chart for additional detail, including current medications / see ROS for current s/s): CIRRHOSIS, COVID-19, ESOPHAGEAL CA (tx w/radiation and chemo), COVID-19, HEP. C (tx in the past), HLD, HTN, PORTAL HTN, SOB, THROMBOCYTOPENIA, HYPOTENSION. CARDIAC TESTING REVIEW:   EKG, 9-12-22:  Narrative & Impression    Normal sinus rhythm  Low voltage QRS  Nonspecific ST and T wave abnormality  Abnormal ECG  When compared with ECG of 05-JUL-2022 14:27,  Previous ECG has undetermined rhythm, needs review      Specimen Collected: 09/12/22 22:18 EDT Last Resulted: 09/13/22 09:48 EDT        ECHO, 12-21-21:  Type of Study      TTE procedure:2D Echocardiogram, M-Mode, Doppler, Color Doppler. Procedure Date  Date: 12/21/2021 Start: 07:31 AM     Study Location: [address] [place] [address]  Technical Quality: Fair visualization     Indications:Edema and Congestive heart failure. History / Tech. Comments:  Hx Covid, Hep C, Hx ETOH, HLD, HTN, Hx Cocaine     Patient Status: Inpatient     Height: 70 inches Weight: 220 pounds BSA: 2.17 m^2 BMI: 31.57 kg/m^2     Rhythm: Within normal limits HR: 65 bpm BP: 114/64 mmHg     CONCLUSIONS     Summary  Left ventricle is normal in size. Mild left ventricular hypertrophy. Global left ventricular systolic function is normal. Estimated LV EF 60-65  %. Left atrium is mildly dilated. No significant valvular regurgitation or stenosis seen. No significant pericardial effusion is seen. Normal aortic root dimension. Signature  ----------------------------------------------------------------------------   Electronically signed by Loraine Mcdaniel(Sonographer) on 12/21/2021 01:17   PM  ----------------------------------------------------------------------------     ----------------------------------------------------------------------------   Electronically signed by Yoni Katz(Interpreting physician) on 12/21/2021   01:25 PM    NPO status: He did drink some water this morning 9:30, last food was yesterday prior to midnight. Medications taken TODAY: \"Normal routine\" medications. Blood thinners: None. Denies personal hx of blood clots. Denies personal hx of MRSA infection. Personal or family hx of previous complications w/anesthesia: PONV w/patient.   PAST MEDICAL HISTORY     Past Medical History:   Diagnosis Date    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 ([facility]     Alcoholic cirrhosis of liver with ascites ([facility]     Anemia 04/13/2022    Anxiety     Arthritis     Back pain, chronic     dr. Ana Webb, orthopedic, every 3-4 months, gets steroid injection    Lezama esophagus     BPH (benign prostatic hypertrophy)     Cholelithiasis     Cirrhosis ([facility]     COVID-19 12/2020    pt reports he had a positive test while at [facility] in 2020, was asymptomatic    COVID-19 vaccine series completed 5/20/2021, 6/22/2021    Moderna 5/20/2021, 6/22/2021    DDD (degenerative disc disease), lumbar     Depression     Esophageal cancer ([facility]     INVASIVE ADENOCARCINOMA ARISING IN TUBULAR ADENOMA WITH HIGH GRADE DYSPLASIA, ASSOCIATED WITH FOCAL INTESTINAL METAPLASIA     Esophageal varices ([address]     Fatty liver     GERD (gastroesophageal reflux disease)     GI bleed     Hep C w/o coma, chronic ([facility]     History of alcohol abuse     6-12 beers a day; quit drinking 2019    History of blood transfusion     History of colon polyps 2016    History of tobacco abuse     [place] (hard of hearing)     Hyperlipidemia     Hypertension     Hyponatremia 07/20/2016 Hypotension 12/20/2021    PONV (postoperative nausea and vomiting)     Port-A-Cath in place     right upper chest    Portal hypertension (HCC)     Sciatica     Secondary esophageal varices ([facility] 06/07/2022    Shortness of breath     Spinal stenosis     Stomach ulcer     hx of    Thrombocytopenia ([facility] 12/23/2020    Tubular adenoma of colon 2016, 2018    Vitamin D deficiency     Wears glasses        SURGICAL HISTORY       Past Surgical History:   Procedure Laterality Date    BUNIONECTOMY      twice on right side    BUNIONECTOMY Left     CARPAL TUNNEL RELEASE Right     COLONOSCOPY      at age 35    COLONOSCOPY  10/05/2016    polyps-pathology tubular adenoma, and abnormal looking mucosa right colon-pathology-tubular adenoma    COLONOSCOPY N/A 03/30/2018    COLONOSCOPY POLYPECTOMY COLD BIOPSY performed by Sariah French MD at [address] [place] [address]  03/30/2018    Small polyp in the sigmoid colon and excised with biopsy forceps--tubular adenoma    COLONOSCOPY N/A 04/16/2022    COLONOSCOPY POLYPECTOMY performed by Sariah French MD at [facility], DIAGNOSTIC      EGD    IR PORT PLACEMENT EQUAL OR GREATER THAN 5 YEARS  04/19/2021    IR PORT PLACEMENT EQUAL OR GREATER THAN 5 YEARS 4/19/2021 [facility]    IR TIPS INSERTION  12/01/2022    IR TIPS INSERTION 12/1/2022 Irene Javed MD [facility]    KNEE SURGERY Left     cyst removed    NASAL SEPTUM SURGERY      NERVE BLOCK Right 11/23/2020    NERVE BLOCK RIGHT CERVICAL STEROID INJECTION  C3-C6 performed by Erasmo Kelsey MD at [address] [facility]  01/04/2016    steroid injection C7 T1    OTHER SURGICAL HISTORY  11/21/2016    Bilateral Lumbar CACHORRO L4-L5 injections    OTHER SURGICAL HISTORY  12/19/2016    lumbar steroid injection    OTHER SURGICAL HISTORY  09/28/2018    BILATERAL L5 CACHORRO (N/A Back)    OTHER SURGICAL HISTORY Right 11/23/2020    cervical injection    PAIN MANAGEMENT PROCEDURE Left 07/09/2020 EPIDURAL STEROID INJECTION LEFT L4 L5 performed by Thiago Hester MD at [facility] Left 07/20/2020    LEFT L4 L5 EPIDURAL STEROID INJECTION performed by Thiago Hester MD at [facility] Bilateral 08/17/2020    LUMBAR FACET BILATERAL L2-L5 performed by Thiago Hester MD at [facility] Bilateral 12/07/2020    NERVE BLOCK BILATERAL LUMBAR MEDIAL BRANCH L2-L5 performed by Thiago Hester MD at [address] [place] [address] [place] [address] AA&/STRD TFRML EPI LUMBAR/SACRAL 1 LEVEL Bilateral 09/06/2018    BILATERAL L5 CACHORRO performed by Thiago Hester MD at [facility] AA&/STRD TFRML EPI LUMBAR/SACRAL 1 LEVEL N/A 09/28/2018    BILATERAL L5 CACHORRO performed by Thiago Hester MD at [address] [place] [address] N/CARPAL TUNNEL SURG Right 08/29/2017    CARPAL TUNNEL RELEASE RIGHT performed by Evelin Kimble MD at [address] [place] [address] N/CARPAL TUNNEL SURG Left 10/31/2017    CARPAL TUNNEL RELEASE performed by Evelin Kimble MD at [address] [place] [address] 12/29/2020    EGD BIOPSY performed by Yuly Syed MD at [address] [place] [address] 02/02/2021    EGD BIOPSY and spot marking performed by Phil Naranjo MD at [address] [place] [address] N/A 02/12/2021    ENDOSCOPIC ULTRASOUND, EGD performed by Jian Muirllo MD at [address] [place] [address]  02/12/2021    EGD DIAGNOSTIC ONLY performed by Jian Murillo MD at [address] [place] [address] 08/31/2021    EGD BIOPSY performed by Phil Naranjo MD at [address] [place] [address] 01/21/2022    EGD BIOPSY performed by Phil Naranjo MD at [address] [place] [address] 04/15/2022    EGD ESOPHAGOGASTRODUODENOSCOPY performed by Yuly Syed MD at [address] [place] [address] 06/06/2022    EGD BAND LIGATION performed by Modesto Taylor MD at [address] [place] [address] N/A 2022    EGD ESOPHAGOGASTRODUODENOSCOPY performed by Modesto Taylor MD at [address] [place] [address] 2022    EGD ESOPHAGOGASTRODUODENOSCOPY ENDOSCOPIC APC AT GE JUNCTION performed by Rahul Eubanks MD at [address] [place] [address] N/A 10/19/2022    EGD BIOPSY performed by Romel Murray MD at [address] [place] [address] N/A 10/31/2022    EGD with APC performed by Romel Murray MD at [address] [place] [address] History     Socioeconomic History    Marital status: Single     Spouse name: None    Number of children: None    Years of education: None    Highest education level: None   Tobacco Use    Smoking status: Former     Packs/day: 1.00     Years: 45.00     Pack years: 45.00     Types: Cigarettes     Quit date: 2017     Years since quittin.8    Smokeless tobacco: Never   Vaping Use    Vaping Use: Never used   Substance and Sexual Activity    Alcohol use: Not Currently     Comment: Quit alcohol in 2019- heavier drinking prior to quitting    Drug use: Not Currently     Frequency: 1.0 times per week     Types: Cocaine     Comment: Cocaine- stopped spring 2016    Sexual activity: Yes     Partners: Female   Social History Narrative     in the past, retired     Social Determinants of Health     Financial Resource Strain: Low Risk     Difficulty of Paying Living Expenses: Not hard at all   Food Insecurity: No Food Insecurity    Worried About [address] in the Last Year: Never true    [address] [facility] [address] in the Last Year: Never true   Physical Activity: Inactive    Days of Exercise per Week: 0 days    Minutes of Exercise per Session: 0 min       REVIEW OF SYSTEMS    No Known Allergies    Current Outpatient Medications on File Prior to Encounter   Medication Sig Dispense Refill diphenhydrAMINE (BENADRYL) 25 MG tablet Take 1 tablet by mouth every 6 hours as needed for Itching (Patient not taking: No sig reported)      pantoprazole (PROTONIX) 40 MG tablet       FEROSUL 325 (65 Fe) MG tablet take 1 tablet by mouth twice a day 60 tablet 5    nadolol (CORGARD) 20 MG tablet take 1 tablet by mouth once daily 30 tablet 2    midodrine (PROAMATINE) 5 MG tablet Take 2 tablets by mouth in the morning and 2 tablets at noon and 2 tablets before bedtime. 90 tablet 3    furosemide (LASIX) 20 MG tablet Take 1 tablet by mouth 2 times daily 60 tablet 3    spironolactone (ALDACTONE) 100 MG tablet Take 1 tablet by mouth every evening 30 tablet 1    ondansetron (ZOFRAN-ODT) 4 MG disintegrating tablet dissolve 1 tablet by mouth every 8 hours if needed for nausea and vomiting (Patient not taking: No sig reported) 10 tablet 0    lactulose (CHRONULAC) 10 GM/15ML solution take 30 milliliters by mouth three times a day 473 mL 1    FLUoxetine (PROZAC) 20 MG capsule Take 1 capsule by mouth daily 30 capsule 3    atorvastatin (LIPITOR) 20 MG tablet Take 1 tablet by mouth nightly 30 tablet 3     No current facility-administered medications on file prior to encounter. Note: Medications were not reconciled in short stay area- patient d/c'd to ED prior to this being done. Review of Systems   Constitutional:  Negative for chills and fever. HENT:  Negative for congestion, ear pain, rhinorrhea, sore throat and trouble swallowing. Respiratory:  Positive for cough, shortness of breath and wheezing. Cardiovascular:  Negative for chest pain, palpitations and leg swelling. Gastrointestinal:         See HPI. Genitourinary:  Negative for dysuria and frequency. Neurological:  Positive for dizziness (Currently), syncope (States he passed out in the past, does not feel close to passing out now) and light-headedness (Currently). Negative for headaches. Hematological:  Bruises/bleeds easily.      GENERAL PHYSICAL EXAM Vitals: /78   Pulse 100   Temp 98.4 °F (36.9 °C) (Temporal)   SpO2 97%      GENERAL APPEARANCE:   Heavenly Lemus is 60 y.o.,  male, mildly obese, nourished, conscious, alert. Physical Exam  Vitals reviewed. Constitutional:       Appearance: He is ill-appearing. He is not toxic-appearing or diaphoretic. HENT:      Head: Normocephalic. Right Ear: External ear normal.      Left Ear: External ear normal.      Nose: Nose normal.      Mouth/Throat:      Pharynx: No oropharyngeal exudate or posterior oropharyngeal erythema. Tonsils: No tonsillar abscesses. Eyes:      General: No scleral icterus. Right eye: No discharge. Left eye: No discharge. Conjunctiva/sclera: Conjunctivae normal.      Pupils: Pupils are equal, round, and reactive to light. Comments: + glasses. Cardiovascular:      Rate and Rhythm: Normal rate and regular rhythm. Pulses: Intact distal pulses. Heart sounds: Murmur heard. Pulmonary:      Effort: Accessory muscle usage (A few times, not consistent) and respiratory distress (Patient does appear to be SOB, mild distress) present. Breath sounds: Decreased breath sounds (Throughout anterior/posterior lungs) present. No wheezing, rhonchi or rales. Abdominal:      General: Abdomen is protuberant. Bowel sounds are normal. There is distension. Palpations: Abdomen is soft. There is no mass. Tenderness: There is no abdominal tenderness. There is no guarding or rebound. Musculoskeletal:      Left hand: Swelling (No erythema, no warmth, denies any recent IV) present. Normal sensation (Denies numbness/tingling). Normal capillary refill. Normal pulse. Right lower leg: No tenderness. 1+ Pitting Edema present. Left lower leg: No tenderness. 1+ Pitting Edema present. Comments: Negative Sebastien's sign b/l (performed in sitting position). No erythema, warmth noted to b/l LE's.     Lymphadenopathy: Cervical: No cervical adenopathy. Skin:     General: Skin is warm and dry. Coloration: Skin is pale. Skin is not jaundiced. Comments: Subdermal port right upper chest.  Linear, superficial scratches/scabs to RLE. Scattered areas of purpura to B/l UE's, scattered, upper chest.   Neurological:      Mental Status: He is alert and oriented to person, place, and time.    Psychiatric:         Behavior: Behavior normal.       RECENT LAB WORK     Lab Results   Component Value Date     12/07/2022    K 4.6 12/07/2022     12/07/2022    CO2 24 12/07/2022    BUN 5 (L) 12/07/2022    CREATININE 0.60 (L) 12/07/2022    GLUCOSE 121 (H) 12/07/2022    CALCIUM 8.2 (L) 12/07/2022    PROT 5.4 (L) 12/07/2022    LABALBU 3.0 (L) 12/07/2022    BILITOT 1.1 12/07/2022    ALKPHOS 179 (H) 12/07/2022    AST 52 (H) 12/07/2022    ALT 29 12/07/2022    LABGLOM >60 12/07/2022    GFRAA >60 09/28/2022    GLOB NOT REPORTED 08/19/2021     Lab Results   Component Value Date    WBC 4.1 12/07/2022    HGB 8.3 (L) 12/07/2022    HCT 26.8 (L) 12/07/2022    MCV 80.4 12/07/2022     (L) 12/07/2022     PROVISIONAL DIAGNOSES / PROCEDURE:      Alcoholic cirrhosis of liver with ascites    IR US GUIDED PARACENTESIS    Patient Active Problem List    Diagnosis Date Noted    S/P TIPS (transjugular intrahepatic portosystemic shunt) 12/01/2022    Goals of care, counseling/discussion 10/31/2022    DNR (do not resuscitate) discussion 10/31/2022    ACP (advance care planning) 10/31/2022    Palliative care encounter 10/31/2022    GI bleed 09/13/2022    Secondary esophageal varices ([address] 06/07/2022    Esophageal polyp 06/07/2022    Drop in hemoglobin 06/03/2022    Portal hypertension (HCC)     Shortness of breath     Ascites 04/26/2022    Acute kidney failure, unspecified ([facility] 04/21/2022    Muscle weakness (generalized) 07/28/2016    Other abnormalities of gait and mobility 07/28/2016    Anemia 04/13/2022    Acute kidney injury ([facility] 04/13/2022 Esophageal adenocarcinoma ([address]     Low hemoglobin 12/20/2021    Symptomatic anemia, microcytic, acute 12/20/2021    Hypotension 12/20/2021    Former smoker, 50+ pack years, quit 2016 12/20/2021    HLD (hyperlipidemia) 12/20/2021    Abnormal findings on diagnostic imaging of spine 12/14/2021    Cervical spinal stenosis 12/14/2021    Spinal stenosis of lumbar region with neurogenic claudication 12/14/2021    Severe comorbid illness 11/30/2021    Gait instability 11/30/2021    Current smoker 04/05/2021    COVID-19 02/23/2021    Anxiety 02/23/2021    Malignant neoplasm of lower third of esophagus ([address]     Hypocalcemia 12/26/2020    Hypophosphatemia 12/26/2020    Gastrointestinal hemorrhage with melena     Alcohol abuse     Altered mental status     Acute gastrointestinal bleeding 12/23/2020    Thrombocytopenia ([address] 12/23/2020    Hepatitis C virus infection resolved after antiviral drug therapy 12/23/2020    Cervical facet syndrome 11/23/2020    Lumbar facet arthropathy 08/17/2020    Elevated LFTs 08/12/2020    Seasonal allergies 08/12/2020    S/P epidural steroid injection 08/05/2020    Essential hypertension 04/24/2019    Recurrent major depressive disorder in partial remission ([address] 04/24/2019    Pure hypercholesterolemia 02/04/2019    Hypokalemia 02/04/2019    Vitamin D deficiency 09/20/2017    History of hepatitis C 09/11/2017    Ganglion cyst 05/31/2017    Carpal tunnel syndrome of right wrist 05/31/2017    Tinnitus 03/23/2017    Eustachian tube dysfunction 03/23/2017    Lumbar radiculitis 11/08/2016    Lumbar disc herniation 11/08/2016    Gynecomastia, male 10/26/2016    Depression 10/13/2016    Vertebrogenic low back pain 10/06/2016    DDD (degenerative disc disease), lumbar 10/06/2016    Hepatic cirrhosis ([address] 09/15/2016    Psychophysiologic insomnia 09/14/2016    Cirrhosis ([address]     Hep C w/o coma, chronic (HCC)     Fatty liver     Calculus of gallbladder without cholecystitis 08/10/2016    Chronic viral hepatitis B without delta agent and without coma ([facility] 07/22/2016    Hypomagnesemia     Alcohol withdrawal syndrome without complication ([facility] 17/24/6290    Cervical radicular pain 01/04/2016    Tubular adenoma of colon 01/01/2016    History of colon polyps 01/01/2016    Back pain, chronic 04/19/2012    Hearing difficulty 04/19/2012    GERD (gastroesophageal reflux disease) 04/19/2012         Approximately 13:15: I did speak w/Dr. Ofelia Talavera regarding the above patient's history and assessment, decision was made per physician to refer patient to the emergency department for further evaluation and tx. I did speak with nursing staff in the ED to give report, patient was transferred down to ED w/nursing staff, as well as his ex-wife who was present for H&P. I did also discuss w/ED nursing staff that if it was appropriate, that Dr. Ofelia Talavera stated that he would still complete paracentesis later today. I did encourage patient to be sure to follow up with PCP as well as GI.     MASSIMO Bobby CNP on 12/9/2022 at 2:03 PM

## 2022-12-10 LAB
ALBUMIN FLUID: 1 G/DL
CULTURE: NORMAL
DIRECT EXAM: NORMAL
SPECIMEN DESCRIPTION: NORMAL
SPECIMEN TYPE: NORMAL

## 2022-12-10 ASSESSMENT — ENCOUNTER SYMPTOMS
NAUSEA: 0
DIARRHEA: 0
CHEST TIGHTNESS: 0
COUGH: 0
ABDOMINAL PAIN: 0
SHORTNESS OF BREATH: 1
ABDOMINAL DISTENTION: 1
TROUBLE SWALLOWING: 0
VOMITING: 0
BACK PAIN: 0
COLOR CHANGE: 0

## 2022-12-10 NOTE — ED PROVIDER NOTES
[address]  eMERGENCY dEPARTMENT eNCOUnter   Attending Attestation     Pt Name: Jeffrey Nelson  MRN: [id number]  Odessagfmartin 1960  Date of evaluation: 12/9/22    History, EXAM, MDM:    Jeffrey Nelson is a 60 y.o. male who presents with Shortness of Breath  Sob, ascites, pleural effusion. ESLD. Hemodynamically stable. CXR shows bilateral pleural effusions. IR guided paracentesis and thoracentesis perfomed. Laboratory studies are not indicative of SBP or empyema. Pt feeling better after procedure. Pt stable for discharge. D/w pt the results, treatment plan, warning precautions for prompt ED return and importance of close OP FU, he verbalizes understanding and agrees with the treatment plan. Vitals:   Vitals:    12/09/22 1619 12/09/22 1632 12/09/22 1647 12/09/22 1851   BP: (!) 148/76 (!) 150/84 (!) 141/79 136/62   Pulse: 97 (!) 111 94 93   Resp: 20 22 22 18   Temp:       SpO2: 98% 96% 98% 98%   Weight:       Height:         I performed a history and physical examination of the patient and discussed management with the resident. I reviewed the residents note and agree with the documented findings and plan of care. Any areas of disagreement are noted on the chart. I was personally present for the key portions of any procedures. I have documented in the chart those procedures where I was not present during the key portions. I have personally reviewed all images and agree with the resident's interpretation. I have reviewed the emergency nurses triage note. I agree with the chief complaint, past medical history, past surgical history, allergies, medications, social and family history as documented unless otherwise noted below. Documentation of the HPI, Physical Exam and Medical Decision Making performed by medical students or scribes is based on my personal performance of the HPI, PE and MDM.  For Phys Assistant/ Nurse Practitioner cases/documentation I have had a face to face evaluation of this patient

## 2022-12-11 LAB
CULTURE: ABNORMAL
CULTURE: ABNORMAL
DIRECT EXAM: ABNORMAL
SPECIMEN DESCRIPTION: ABNORMAL
SPECIMEN DESCRIPTION: ABNORMAL

## 2022-12-12 ENCOUNTER — CARE COORDINATION (OUTPATIENT)
Dept: CASE MANAGEMENT | Age: 62
End: 2022-12-12

## 2022-12-12 LAB
BASO FLUID: ABNORMAL %
BASO FLUID: ABNORMAL %
CULTURE: NORMAL
CULTURE: NORMAL
DIRECT EXAM: NORMAL
EOSINOPHIL FLUID: ABNORMAL %
EOSINOPHIL FLUID: ABNORMAL %
FLUID DIFF COMMENT: ABNORMAL
FLUID DIFF COMMENT: ABNORMAL
LYMPHOCYTES, BODY FLUID: 21 %
LYMPHOCYTES, BODY FLUID: 40 %
MONOCYTE, FLUID: ABNORMAL %
MONOCYTE, FLUID: ABNORMAL %
NEUTROPHIL, FLUID: 1 %
NEUTROPHIL, FLUID: 21 %
OTHER CELLS FLUID: 39 %
OTHER CELLS FLUID: 78 %
SPECIMEN DESCRIPTION: NORMAL
SPECIMEN DESCRIPTION: NORMAL

## 2022-12-12 NOTE — CARE COORDINATION
Care Transitions Outreach Attempt    Call within 2 business days of discharge: Yes   Attempted to reach patient for transitions of care follow up. Unable to reach patient. 1st attempt. Noted that patient went to the ED, was short of breath. He had 1.25L removed by thoracentesis and had 1.9 L removed by paracentesis on . Patient: Sana Irving Patient : 1960 MRN: [id number]    Last Discharge [address]       Date Complaint Diagnosis Description Type Department Provider    22 Shortness of Breath Ascites of liver ED (DISCHARGE) [facility] Sang Vilchis MD              Was this an external facility discharge?  No Discharge Facility: [facility]    Noted following upcoming appointments from discharge chart review:   [facility] follow up appointment(s):   Future Appointments   Date Time Provider Carolyn English   2022  1:00 PM [facility] IR  [facility] Radi[facility]   2022  1:00 PM [facility] IR  [facility] Radi[facility]   2022  1:00 PM [facility] IR  [facility] Radi[facility]   2023  1:00 PM [facility] IR  [facility] Radiolog   2023 11:00 AM VASU Sanchez [facility]   2023  1:00 PM [facility] IR  [facility] Radiolog   2023  1:00 PM [facility] IR  [facility] Radiolog   2023  1:00 PM [facility] IR  [facility] Radiolog   2/3/2023  1:00 PM [facility] IR  [facility] Radiolog   2023  1:00 PM Aleksandr Magaña MD [facility] GI [facility]   2/10/2023  1:00 PM [facility] IR  [facility] Radiolog   2023  1:15 PM Hortensia Chris MD [facility]   2023  1:00 PM [facility] IR  [facility] Radiolog   2023  1:00 PM [facility] IR  [facility] Radiolog   3/3/2023  1:00 PM [facility] IR  [facility] Radiolog   3/10/2023  1:00 PM [facility] IR  [facility] Radiolog   3/17/2023  1:00 PM [facility] IR  [facility] Radiolog   3/24/2023  1:00 PM [facility] IR  [facility] Radiolog   3/31/2023 1:00 PM [facility] IR  [facility] Radiolog   4/7/2023  1:00 PM [facility] IR  [facility] Radiolog   4/14/2023  1:00 PM [facility] IR  [facility] Radiolog   4/21/2023  1:00 PM [facility] IR  [facility] Radiolog   4/28/2023  1:00 PM [facility] IR  [facility] Radiolog   5/5/2023  1:00 PM [facility] IR  [facility] Radiolog   5/12/2023  1:00 PM [facility] IR  [facility] Radiolog   5/19/2023  1:00 PM [facility] IR  [facility] Radiolog   5/26/2023  1:00 PM [facility] IR  [facility] Radiolog   6/2/2023  1:00 PM [facility] IR  [facility] Radiolog   6/9/2023  1:00 PM [facility] IR  [facility] Radiolog   6/16/2023  1:00 PM [facility]C IR  [facility] Radiolog   6/23/2023  1:00 PM [facility]C IR  [facility] Radiolog     Non-[facility] follow up appointment(s): n/a

## 2022-12-13 ENCOUNTER — HOSPITAL ENCOUNTER (OUTPATIENT)
Age: 62
Discharge: HOME | End: 2022-12-13
Payer: MEDICARE

## 2022-12-13 ENCOUNTER — CARE COORDINATION (OUTPATIENT)
Dept: CASE MANAGEMENT | Age: 62
End: 2022-12-13

## 2022-12-13 LAB
ABSOLUTE EOS #: 0.05 K/UL (ref 0–0.4)
ABSOLUTE LYMPH #: 0.71 K/UL (ref 1–4.8)
ABSOLUTE MONO #: 0.66 K/UL (ref 0.1–1.3)
ALBUMIN SERPL-MCNC: 2.7 G/DL (ref 3.5–5.2)
ALP BLD-CCNC: 204 U/L (ref 40–129)
ALT SERPL-CCNC: 17 U/L (ref 5–41)
ANION GAP SERPL CALCULATED.3IONS-SCNC: 10 MMOL/L (ref 9–17)
AST SERPL-CCNC: 44 U/L
BASOPHILS # BLD: 1 % (ref 0–2)
BASOPHILS ABSOLUTE: 0.05 K/UL (ref 0–0.2)
BILIRUB SERPL-MCNC: 1.6 MG/DL (ref 0.3–1.2)
BUN BLDV-MCNC: 5 MG/DL (ref 8–23)
CALCIUM SERPL-MCNC: 8.2 MG/DL (ref 8.6–10.4)
CHLORIDE BLD-SCNC: 103 MMOL/L (ref 98–107)
CO2: 25 MMOL/L (ref 20–31)
CREAT SERPL-MCNC: 0.47 MG/DL (ref 0.7–1.2)
EKG ATRIAL RATE: 91 BPM
EKG P AXIS: 5 DEGREES
EKG P-R INTERVAL: 148 MS
EKG Q-T INTERVAL: 370 MS
EKG QRS DURATION: 76 MS
EKG QTC CALCULATION (BAZETT): 455 MS
EKG R AXIS: 9 DEGREES
EKG T AXIS: 13 DEGREES
EKG VENTRICULAR RATE: 91 BPM
EOSINOPHILS RELATIVE PERCENT: 1 % (ref 0–4)
GFR SERPL CREATININE-BSD FRML MDRD: >60 ML/MIN/1.73M2
GLUCOSE BLD-MCNC: 123 MG/DL (ref 70–99)
HCT VFR BLD CALC: 26.8 % (ref 41–53)
HEMOGLOBIN: 8.3 G/DL (ref 13.5–17.5)
LYMPHOCYTES # BLD: 15 % (ref 24–44)
MCH RBC QN AUTO: 24.5 PG (ref 26–34)
MCHC RBC AUTO-ENTMCNC: 30.9 G/DL (ref 31–37)
MCV RBC AUTO: 79.3 FL (ref 80–100)
MONOCYTES # BLD: 14 % (ref 1–7)
MORPHOLOGY: ABNORMAL
PDW BLD-RTO: 17.7 % (ref 11.5–14.9)
PLATELET # BLD: 134 K/UL (ref 150–450)
PMV BLD AUTO: 7 FL (ref 6–12)
POTASSIUM SERPL-SCNC: 4.1 MMOL/L (ref 3.7–5.3)
RBC # BLD: 3.38 M/UL (ref 4.5–5.9)
SEG NEUTROPHILS: 69 % (ref 36–66)
SEGMENTED NEUTROPHILS ABSOLUTE COUNT: 3.23 K/UL (ref 1.3–9.1)
SODIUM BLD-SCNC: 138 MMOL/L (ref 135–144)
SURGICAL PATHOLOGY REPORT: NORMAL
TOTAL PROTEIN: 5.3 G/DL (ref 6.4–8.3)
WBC # BLD: 4.7 K/UL (ref 3.5–11)

## 2022-12-13 PROCEDURE — 80053 COMPREHEN METABOLIC PANEL: CPT

## 2022-12-13 PROCEDURE — 85025 COMPLETE CBC W/AUTO DIFF WBC: CPT

## 2022-12-13 PROCEDURE — 36415 COLL VENOUS BLD VENIPUNCTURE: CPT

## 2022-12-13 NOTE — CARE COORDINATION
[facility] Care Transitions Follow Up Call    Care Transition Nurse contacted the patient by telephone to follow up after admission on . Verified name and  with patient as identifiers. Patient: Maryuri Arredondo  Patient : 1960   MRN: [id number]  Reason for Admission: S/P TIPS procedure  Discharge Date: 22 RARS: Readmission Risk Score: 29.4      Needs to be reviewed by the provider   Additional needs identified to be addressed with provider: No  none             Method of communication with provider: none. Spoke with patient who said he is doing ok today. He has some dyspnea and non productive cough. He was seen by oncology and by GI last week. He went to the ED on Friday when he was going for a paracentesis but they were concerned by his shortness of breath and sent him to the ED. He ultimately had the paracentesis done on Friday. He has some FARIA, reports occasional non productive cough. He is scheduled for  for paracentesis, had TIPS procedure done the week before last and has felt somewhat better. He has some LE edema but this has improved some. Home care nurse comes out late afternoon on  and will be seeing him later today. He denies any new needs, did discuss with him after review of CXR to monitor for any new f/c, cough, increased congestion and to report to MD if these symptoms occur/worsen. Addressed changes since last contact:  none  Discussed follow-up appointments.    Follow Up  Future Appointments   Date Time Provider Carolyn English   2022  1:00 PM [facility] IR  [facility] Radiolog   2022  1:00 PM [facility] IR  [facility] Radiolog   2022  1:00 PM [facility] IR  [facility] Radiolog   2023  1:00 PM [facility] IR  [facility] Radiolog   2023 11:00 AM VASU Montaño TOP   2023  1:00 PM STC IR  [facility] Radiolog   2023  1:00 PM [facility]C IR  [facility] Radiolog   2023 1:00 PM [facility] IR  [facility] Radiolog   2/3/2023  1:00 PM [facility] IR  [facility] Radiolog   2/8/2023  1:00 PM Jessica Carver MD [facility]   2/10/2023  1:00 PM [facility]C IR  [facility] Radiolog   2/16/2023  1:15 PM Arvind Ellison MD [facility]   2/17/2023  1:00 PM [facility]C IR  [facility] Radiolog   2/24/2023  1:00 PM [facility]C IR  [facility] Radiolog   3/3/2023  1:00 PM [facility] IR  [facility] Radiolog   3/10/2023  1:00 PM [facility]C IR  [facility] Radiolog   3/17/2023  1:00 PM [facility]C IR  [facility] Radiolog   3/24/2023  1:00 PM STC IR  [facility] Radiolog   3/31/2023  1:00 PM [facility] IR  [facility] Radiolog   4/7/2023  1:00 PM [facility] IR  [facility] Radiolog   4/14/2023  1:00 PM [facility]C IR  [facility] Radiolog   4/21/2023  1:00 PM [facility] IR  [facility] Radiolog   4/28/2023  1:00 PM [facility]C IR  [facility] Radiolog   5/5/2023  1:00 PM [facility] IR  [facility] Radiolog   5/12/2023  1:00 PM [facility] IR  [facility] Radiolog   5/19/2023  1:00 PM [facility]C IR  [facility] Radiolog   5/26/2023  1:00 PM [facility]C IR  [facility] Radiolog   6/2/2023  1:00 PM STC IR  [facility] Radiolog   6/9/2023  1:00 PM STC IR  [facility] Radiolog   6/16/2023  1:00 PM STC IR  [facility] Radiolog   6/23/2023  1:00 PM [facility] IR  [facility] Radiolog     Non-[facility] follow up appointment(s): n/a    Care Transition Nurse reviewed discharge instructions with patient and discussed any barriers to care and/or understanding of plan of care after discharge. Discussed appropriate site of care based on symptoms and resources available to patient including: PCP  Specialist. The patient agrees to contact the PCP office for questions related to their healthcare. Advance Care Planning:   not on file; education provided.      Patients top risk factors for readmission: medical condition-Hepatic cirrhosis  Interventions to address risk factors: Obtained and reviewed discharge summary and/or continuity of care documents    Offered patient enrollment in the Remote Patient Monitoring (RPM) program for in-home monitoring: Yes, but did not enroll at this time. Care Transitions Subsequent and Final Call    Schedule Follow Up Appointment with PCP: Completed  Subsequent and Final Calls  Do you have any ongoing symptoms?: Yes  Onset of Patient-reported symptoms: In the past 7 days  Patient-reported symptoms: Shortness of Breath, Other  Have your medications changed?: No  Do you have any questions related to your medications?: No  Do you currently have any active services?: Yes  Are you currently active with any services?: Home Health  Do you have any needs or concerns that I can assist you with?: No  Identified Barriers: Lack of Education  Care Transitions Interventions  Other Interventions:             Care Transition Nurse provided contact information for future needs. Plan for follow-up call in 5-7 days based on severity of symptoms and risk factors. Plan for next call:  check on congestion, swelling, shortness of breath.      Geovanna Esquivel RN

## 2022-12-16 ENCOUNTER — HOSPITAL ENCOUNTER (OUTPATIENT)
Dept: INTERVENTIONAL RADIOLOGY/VASCULAR | Age: 62
End: 2022-12-16
Payer: MEDICARE

## 2022-12-16 VITALS
BODY MASS INDEX: 30.77 KG/M2 | WEIGHT: 214.95 LBS | SYSTOLIC BLOOD PRESSURE: 142 MMHG | OXYGEN SATURATION: 98 % | RESPIRATION RATE: 16 BRPM | DIASTOLIC BLOOD PRESSURE: 74 MMHG | HEART RATE: 88 BPM | HEIGHT: 70 IN | TEMPERATURE: 97.1 F

## 2022-12-16 DIAGNOSIS — K70.31 ALCOHOLIC CIRRHOSIS OF LIVER WITH ASCITES (HCC): ICD-10-CM

## 2022-12-16 PROCEDURE — 6360000002 HC RX W HCPCS: Performed by: RADIOLOGY

## 2022-12-16 PROCEDURE — 7100000010 HC PHASE II RECOVERY - FIRST 15 MIN

## 2022-12-16 PROCEDURE — 2580000003 HC RX 258: Performed by: RADIOLOGY

## 2022-12-16 PROCEDURE — 49083 ABD PARACENTESIS W/IMAGING: CPT

## 2022-12-16 PROCEDURE — 7100000011 HC PHASE II RECOVERY - ADDTL 15 MIN

## 2022-12-16 PROCEDURE — 2709999900 IR US GUIDED PARACENTESIS

## 2022-12-16 RX ORDER — SODIUM CHLORIDE 9 MG/ML
INJECTION, SOLUTION INTRAVENOUS CONTINUOUS
Status: DISCONTINUED | OUTPATIENT
Start: 2022-12-16 | End: 2022-12-19 | Stop reason: HOSPADM

## 2022-12-16 RX ORDER — SODIUM CHLORIDE 9 MG/ML
INJECTION, SOLUTION INTRAVENOUS PRN
Status: DISCONTINUED | OUTPATIENT
Start: 2022-12-16 | End: 2022-12-19 | Stop reason: HOSPADM

## 2022-12-16 RX ORDER — SODIUM CHLORIDE 0.9 % (FLUSH) 0.9 %
5-40 SYRINGE (ML) INJECTION EVERY 12 HOURS SCHEDULED
Status: DISCONTINUED | OUTPATIENT
Start: 2022-12-16 | End: 2022-12-19 | Stop reason: HOSPADM

## 2022-12-16 RX ORDER — SODIUM CHLORIDE 0.9 % (FLUSH) 0.9 %
5-40 SYRINGE (ML) INJECTION PRN
Status: DISCONTINUED | OUTPATIENT
Start: 2022-12-16 | End: 2022-12-19 | Stop reason: HOSPADM

## 2022-12-16 RX ORDER — HEPARIN SODIUM (PORCINE) LOCK FLUSH IV SOLN 100 UNIT/ML 100 UNIT/ML
500 SOLUTION INTRAVENOUS
Status: COMPLETED | OUTPATIENT
Start: 2022-12-16 | End: 2022-12-16

## 2022-12-16 RX ORDER — ACETAMINOPHEN 325 MG/1
650 TABLET ORAL EVERY 4 HOURS PRN
Status: DISCONTINUED | OUTPATIENT
Start: 2022-12-16 | End: 2022-12-19 | Stop reason: HOSPADM

## 2022-12-16 RX ADMIN — SODIUM CHLORIDE, PRESERVATIVE FREE 10 ML: 5 INJECTION INTRAVENOUS at 13:59

## 2022-12-16 RX ADMIN — HEPARIN 500 UNITS: 100 SYRINGE at 14:00

## 2022-12-16 ASSESSMENT — PAIN - FUNCTIONAL ASSESSMENT: PAIN_FUNCTIONAL_ASSESSMENT: NONE - DENIES PAIN

## 2022-12-16 NOTE — PROGRESS NOTES
Patient tolerated paracentesis without distress. 4800 ml of yellow fluid removed. Dry dressing to site. Patient returned to room. Nurse updated.

## 2022-12-16 NOTE — H&P
HISTORY and [id number]       NAME:  Fred Rosenberg  MRN: [id number]   YOB: 1960   Date: 12/16/2022   Age: 60 y.o. Gender: male       COMPLAINT AND PRESENT HISTORY:   Fred Rosenberg is 60 y.o.,  male, here today for paracentesis. Patient has history of liver cirrhosis with ascites. Patient recently had TIPS done on 12/01/22 and follow up with GI on 12/8/22. Patient's last paracentesis was on 12/09/22 . Upon arrival patient exhibited SOB, he was sent to ED. See ED notes on 12/9/22   HISTORY OF PRESENT ILLNESS  (Location/Symptom, Timing/Onset, Context/Setting, Quality, Duration, Modifying Factors, Severity.)       Fred Rosenberg is a 60 y.o. male who presents with shortness of breath. Patient has a history of hepatitis C, recurrent ascites. Patient was sent here from gastroenterology apparently during his clinic visit. Patient was supposed to have a paracentesis/thoracentesis however he was determined to be unstable due to being somewhat short of breath. His O2 sat is greater than 96% on room air, he is not tachycardic, blood pressure was marginally hypertensive at 147/68 in the ED today. Patient is afebrile at 97.4. He is not have any acute abdominal tenderness, he does not have any other concerns or complaints today. Patient does have some mild to moderate abdominal distention that he states is much better than his usual state, he is not having any low back pain, no fevers chills, no nausea or vomiting. He is not having any chest pain currently. He is unable to lay flat completely due to worsening shortness of breath. Patient is otherwise not in any acute distress. Patient had paracentesis was performed. On 12/9/22 with  a total of 1.9 L of cloudy stephanie fluid was aspirated. Patient also had under ultrasound guidance into the pleural effusion, and thoracentesis was performed. A total of 1025 mL of cloudy yellow fluid was aspirated.      Patient here for paracentesis today he is receiving treatments. Today patient is somewhat lethargic and has had several nods during this visit. Pt responds appropriately when prompted. Patient appears mildly jaundice   Patient reports significant relief of symptoms after treatments. Patient reports abdominal distension, fullness and pressure without  radiation. Rates at 8/10. Pt denies any localized abdominal tenderness. Patient admits to associated shortness of breath, improved by sitting in chair or lying down. Patient admits to  recent appetite changes due  to fullness. Patient has regular bowel movements and is taking lactulose  Patient notice that is  easy bleeding/bruising. Patient has numerous scratch marks to arms and legs. Patient denies any personal history of blood clots. Patient  reports noticing  swelling in the lower legs bilaterally and is on Lasix and spirolactone. Patient has been NPO since midnight. No blood thinners in the past 5 days. Patient states that he had Gatorade with his meds this morning. Pt denies any acute symptoms of illness at this time including , No CP, fever or chills.      PAST MEDICAL HISTORY     Past Medical History:   Diagnosis Date    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 ([address]     Alcoholic cirrhosis of liver with ascites ([address]     Anemia 04/13/2022    Anxiety     Arthritis     Back pain, chronic     dr. Yan Castro, orthopedic, every 3-4 months, gets steroid injection    Lezama esophagus     BPH (benign prostatic hypertrophy)     Cholelithiasis     Cirrhosis ([address]     COVID-19 12/2020    pt reports he had a positive test while at [facility] in 2020, was asymptomatic    COVID-19 vaccine series completed 5/20/2021, 6/22/2021    Moderna 5/20/2021, 6/22/2021    DDD (degenerative disc disease), lumbar     Depression     Esophageal cancer ([address]     INVASIVE ADENOCARCINOMA ARISING IN TUBULAR ADENOMA WITH HIGH GRADE DYSPLASIA, ASSOCIATED WITH FOCAL INTESTINAL METAPLASIA Esophageal varices (HCC)     Fatty liver     GERD (gastroesophageal reflux disease)     GI bleed     Hep C w/o coma, chronic ([address]     History of alcohol abuse     6-12 beers a day; quit drinking 2019    History of blood transfusion     History of colon polyps 2016    History of tobacco abuse     Ewiia[place]ayp (hard of hearing)     Hyperlipidemia     Hypertension     Hyponatremia 07/20/2016    Hypotension 12/20/2021    PONV (postoperative nausea and vomiting)     Port-A-Cath in place     right upper chest    Portal hypertension ([address]     Sciatica     Secondary esophageal varices ([address] 06/07/2022    Shortness of breath     Spinal stenosis     Stomach ulcer     hx of    Thrombocytopenia ([address] 12/23/2020    Tubular adenoma of colon 2016, 2018    Vitamin D deficiency     Wears glasses        SURGICAL HISTORY       Past Surgical History:   Procedure Laterality Date    BUNIONECTOMY      twice on right side    BUNIONECTOMY Left     CARPAL TUNNEL RELEASE Right     COLONOSCOPY      at age 35    COLONOSCOPY  10/05/2016    polyps-pathology tubular adenoma, and abnormal looking mucosa right colon-pathology-tubular adenoma    COLONOSCOPY N/A 03/30/2018    COLONOSCOPY POLYPECTOMY COLD BIOPSY performed by Romel Murray MD at [address] [place] [address],[address]  03/30/2018    Small polyp in the sigmoid colon and excised with biopsy forceps--tubular adenoma    COLONOSCOPY N/A 04/16/2022    COLONOSCOPY POLYPECTOMY performed by Romel Murray MD at [facility] [place], DIAGNOSTIC      EGD    IR PORT PLACEMENT EQUAL OR GREATER THAN 5 YEARS  04/19/2021    IR PORT PLACEMENT EQUAL OR GREATER THAN 5 YEARS 4/19/2021 [facility]    IR TIPS INSERTION  12/01/2022    IR TIPS INSERTION 12/1/2022 Breanne Hernández MD [facility]    KNEE SURGERY Left     cyst removed    NASAL SEPTUM SURGERY      NERVE BLOCK Right 11/23/2020    NERVE BLOCK RIGHT CERVICAL STEROID INJECTION  C3-C6 performed by Leah Godoy MD at [address] [place] [address] 2021    EGD BIOPSY performed by Paco Royal MD at [address] [facility] 2022    EGD BIOPSY performed by Paco Royal MD at [address] N/A 04/15/2022    EGD ESOPHAGOGASTRODUODENOSCOPY performed by Amy Hoskins MD at [address] [facility] N/A 2022    EGD BAND LIGATION performed by Chandan Yoder MD at [address] N/A 2022    EGD ESOPHAGOGASTRODUODENOSCOPY performed by Chandan Yoder MD at [address] [place] [address] N/A 2022    EGD ESOPHAGOGASTRODUODENOSCOPY ENDOSCOPIC APC AT [address] performed by Virginie Robertson MD at [address] [facility] N/A 10/19/2022    EGD BIOPSY performed by Paco Royal MD at [facility] 10/31/2022    EGD with APC performed by Paco Royal MD at [address] [facility] [facility]       Family History   Problem Relation Age of Onset    Cancer Mother         pancreatic    Cancer Father         bone    Diabetes Sister     Asthma Brother     Allergies Brother         MULTIPLE       SOCIAL HISTORY       Social History     Socioeconomic History    Marital status: Single     Spouse name: None    Number of children: None    Years of education: None    Highest education level: None   Tobacco Use    Smoking status: Former     Packs/day: 1.00     Years: 45.00     Pack years: 45.00     Types: Cigarettes     Quit date: 2017     Years since quittin.9    Smokeless tobacco: Never   Vaping Use    Vaping Use: Never used   Substance and Sexual Activity    Alcohol use: Not Currently     Comment: Quit alcohol in 2019- heavier drinking prior to quitting    Drug use: Not Currently     Frequency: 1.0 times per week     Types: Cocaine     Comment: Cocaine- stopped spring 2016    Sexual activity: Yes     Partners: Female Social History Narrative     in the past, retired     Social Determinants of Health     Financial Resource Strain: Low Risk     Difficulty of Paying Living Expenses: Not hard at all   Food Insecurity: No Food Insecurity    Worried About [address] in the Last Year: Never true    [facility] in the Last Year: Never true   Physical Activity: Inactive    Days of Exercise per Week: 0 days    Minutes of Exercise per Session: 0 min           REVIEW OF SYSTEMS      No Known Allergies    Current Outpatient Medications on File Prior to Encounter   Medication Sig Dispense Refill    diphenhydrAMINE (BENADRYL) 25 MG tablet Take 1 tablet by mouth every 6 hours as needed for Itching (Patient not taking: No sig reported)      pantoprazole (PROTONIX) 40 MG tablet       FEROSUL 325 (65 Fe) MG tablet take 1 tablet by mouth twice a day 60 tablet 5    nadolol (CORGARD) 20 MG tablet take 1 tablet by mouth once daily 30 tablet 2    midodrine (PROAMATINE) 5 MG tablet Take 2 tablets by mouth in the morning and 2 tablets at noon and 2 tablets before bedtime. 90 tablet 3    furosemide (LASIX) 20 MG tablet Take 1 tablet by mouth 2 times daily 60 tablet 3    spironolactone (ALDACTONE) 100 MG tablet Take 1 tablet by mouth every evening 30 tablet 1    ondansetron (ZOFRAN-ODT) 4 MG disintegrating tablet dissolve 1 tablet by mouth every 8 hours if needed for nausea and vomiting (Patient not taking: No sig reported) 10 tablet 0    lactulose (CHRONULAC) 10 GM/15ML solution take 30 milliliters by mouth three times a day 473 mL 1    FLUoxetine (PROZAC) 20 MG capsule Take 1 capsule by mouth daily 30 capsule 3    atorvastatin (LIPITOR) 20 MG tablet Take 1 tablet by mouth nightly 30 tablet 3     No current facility-administered medications on file prior to encounter. Negative except for what is mentioned in the HPI.      GENERAL PHYSICAL EXAM     Vitals: /80   Pulse (!) 109   Temp 97.3 °F (36.3 °C) (Infrared) Resp 18   Ht 5' 10\" (1.778 m)   Wt 214 lb 15.2 oz (97.5 kg)   SpO2 98%   BMI 30.84 kg/m²  Body mass index is 30.84 kg/m². GENERAL APPEARANCE:   Heavenly Lemus is 60 y.o., male,  distended with ascites , nourished, conscious, alert. Does not appear to be distress or pain at this time. More lethargic and dishelved appearance. SKIN:  Warm, dry, no cyanosis . Pt exhibits jaundice. HEAD:  Normocephalic, atraumatic, no swelling or tenderness. EYES:  Pupils equal, reactive to light. EARS:  No discharge, marked hearing loss, nydia               NOSE:  No rhinorrhea, epistaxis or septal deformity. THROAT:  Not congested. No ulceration bleeding or discharge. NECK:  No stiffness, trachea central.  No palpable masses or L.N.                 CHEST:  Symmetrical and equal on expansion. Mediport present to right chest.                HEART:  RRR . No audible murmurs or gallops. LUNGS:  Equal on expansion, normal breath sounds. No adventitious sounds. ABDOMEN:  Abdomen is distended with ascites and positive fluid wave. No dysphagia, No localized tenderness. No guarding or rigidity. LYMPHATICS:  No palpable cervical lymphadenopathy. LOCOMOTOR, BACK AND SPINE:  No tenderness or deformities. EXTREMITIES:  Symmetrical, no pretibial edema. No discoloration or ulcerations. NEUROLOGIC:  The patient is conscious, alert, oriented,Cranial nerve II-XII intact, taste and smell were not examined. No apparent focal sensory or motor deficits.              PROVISIONAL DIAGNOSES / SURGERY:      IR  Paracentesis       Alcoholic cirrhosis     Patient Active Problem List    Diagnosis Date Noted    S/P TIPS (transjugular intrahepatic portosystemic shunt) 12/01/2022    Goals of care, counseling/discussion 10/31/2022    DNR (do not resuscitate) discussion 10/31/2022    ACP (advance care planning) 10/31/2022    Palliative care encounter 10/31/2022    GI bleed 09/13/2022    Secondary esophageal varices ([address] 06/07/2022    Esophageal polyp 06/07/2022    Drop in hemoglobin 06/03/2022    Portal hypertension (HCC)     Shortness of breath     Ascites 04/26/2022    Acute kidney failure, unspecified ([address] 04/21/2022    Muscle weakness (generalized) 07/28/2016    Other abnormalities of gait and mobility 07/28/2016    Anemia 04/13/2022    Acute kidney injury ([address] 04/13/2022    Esophageal adenocarcinoma (HCC)     Low hemoglobin 12/20/2021    Symptomatic anemia, microcytic, acute 12/20/2021    Hypotension 12/20/2021    Former smoker, 50+ pack years, quit 2016 12/20/2021    HLD (hyperlipidemia) 12/20/2021    Abnormal findings on diagnostic imaging of spine 12/14/2021    Cervical spinal stenosis 12/14/2021    Spinal stenosis of lumbar region with neurogenic claudication 12/14/2021    Severe comorbid illness 11/30/2021    Gait instability 11/30/2021    Current smoker 04/05/2021    COVID-19 02/23/2021    Anxiety 02/23/2021    Malignant neoplasm of lower third of esophagus ([address]     Hypocalcemia 12/26/2020    Hypophosphatemia 12/26/2020    Gastrointestinal hemorrhage with melena     Alcohol abuse     Altered mental status     Acute gastrointestinal bleeding 12/23/2020    Thrombocytopenia ([address] 12/23/2020    Hepatitis C virus infection resolved after antiviral drug therapy 12/23/2020    Cervical facet syndrome 11/23/2020    Lumbar facet arthropathy 08/17/2020    Elevated LFTs 08/12/2020    Seasonal allergies 08/12/2020    S/P epidural steroid injection 08/05/2020    Essential hypertension 04/24/2019    Recurrent major depressive disorder in partial remission ([address] 04/24/2019    Pure hypercholesterolemia 02/04/2019    Hypokalemia 02/04/2019    Vitamin D deficiency 09/20/2017    History of hepatitis C 09/11/2017    Ganglion cyst 05/31/2017    Carpal tunnel syndrome of right wrist 05/31/2017    Tinnitus 03/23/2017 Eustachian tube dysfunction 03/23/2017    Lumbar radiculitis 11/08/2016    Lumbar disc herniation 11/08/2016    Gynecomastia, male 10/26/2016    Depression 10/13/2016    Vertebrogenic low back pain 10/06/2016    DDD (degenerative disc disease), lumbar 10/06/2016    Hepatic cirrhosis ([address] 09/15/2016    Psychophysiologic insomnia 09/14/2016    Cirrhosis ([address]     Hep C w/o coma, chronic (HCC)     Fatty liver     Calculus of gallbladder without cholecystitis 08/10/2016    Chronic viral hepatitis B without delta agent and without coma ([address] 07/22/2016    Hypomagnesemia     Alcohol withdrawal syndrome without complication ([address] 11/19/0480    Cervical radicular pain 01/04/2016    Tubular adenoma of colon 01/01/2016    History of colon polyps 01/01/2016    Back pain, chronic 04/19/2012    Hearing difficulty 04/19/2012    GERD (gastroesophageal reflux disease) 04/19/2012           MASSIMO Lee - CNP on 12/16/2022 at 1:11 PM

## 2022-12-16 NOTE — DISCHARGE INSTRUCTIONS
DISCHARGE INSTRUCTIONS FOR PARACENTESIS    In order to continue your care at home, please follow the instructions below. Medications    Use over-the-counter pain medication as directed on bottle for pain control    Post Procedure Site/Care/Activity  For up to 2 days after the procedure, you may have a small amount of clear fluid coming out of the site where the needle was inserted, especially if you had a lot of fluid removed. You may need to change the bandage on the site. Do not lie totally flat until morning. You can do your normal activities after the procedure, unless instructed differently. Call your doctor or seek immediate medical care if:   You have symptoms of infection, such as increased pain, swelling, warmth, or redness, red streaks or pus. An oral temperature (by mouth) is 101 degrees or higher (fever), chills, fever. You are dizzy or lightheaded, or you feel like you may faint. You have new or worse belly pain. Watch closely for changes in your health, and be sure to contact your doctor if:   Fluid builds up in your belly again. You do not get better as expected.       IF YOU CANNOT REACH THE DOCTOR, GO TO THE NEAREST [place] [address] CALL 888    Phone: Interventional Radiology   [phone]      Dr Mike Olivares  After hours Radiology   [phone]     4.8 Liters removed

## 2022-12-16 NOTE — OP NOTE
Brief Postoperative Note    Mary Colon  YOB: 1960  [id number]    Pre-operative Diagnosis: ascites    Post-operative Diagnosis: Same    Procedure: paracentesis    Anesthesia: Local     Surgeons/Assistants: Brown Clements MD     Estimated Blood Loss: minimal    Complications: none immediate    Specimens: were obtained      Electronically signed by Brown Clements MD on 12/16/2022 at 1:53 PM

## 2022-12-19 LAB
CULTURE: NORMAL
CULTURE: NORMAL
DIRECT EXAM: NORMAL
SPECIMEN DESCRIPTION: NORMAL
SPECIMEN DESCRIPTION: NORMAL

## 2022-12-19 RX ORDER — SPIRONOLACTONE 50 MG/1
50 TABLET, FILM COATED ORAL EVERY EVENING
Qty: 30 TABLET | Refills: 3 | OUTPATIENT
Start: 2022-12-19

## 2022-12-21 ENCOUNTER — CARE COORDINATION (OUTPATIENT)
Dept: CASE MANAGEMENT | Age: 62
End: 2022-12-21

## 2022-12-21 NOTE — CARE COORDINATION
Care Transitions Outreach Attempt #1    Attempt #1 to reach patient for transitions of care follow up. Unable to reach patient. Left HIPAA compliant VM requesting call back. Pt has paracentesis q Friday for ascites of liver. Patient: Heidi Quinteros Patient : 1960 MRN: [id number]    Last Discharge  Street       Date Complaint Diagnosis Description Type Department Provider    22 Shortness of Breath Ascites of liver ED (DISCHARGE) [facility] Megan Evans MD              Was this an external facility discharge?  No Discharge Facility: [facility]    Noted following upcoming appointments from discharge chart review:   [facility] follow up appointment(s):   Future Appointments   Date Time Provider Carolyn English   2022  1:00 PM [facility] IR  [facility] Radiolog   2022  1:00 PM [facility] IR  [facility] Radiolog   2023  1:00 PM [facility] IR  [facility] Radi[facility]   2023 11:00 AM VASU Masters [facility]   2023  1:00 PM [facility] IR  [facility] Radiolog   2023  1:00 PM [facility] IR  [facility] Radi[facility]   2023  1:00 PM [facility] IR  [facility] Radiolog   2/3/2023  1:00 PM [facility] IR  [facility] Radiolog   2023  1:00 PM Asia Rondon MD [facility] GI [facility]   2/10/2023  1:00 PM ST[facility] IR  [facility] Radiolog   2023  1:15 PM Luis Vera MD [facility]   2023  1:00 PM ST[facility] IR  [facility] Radiolog   2023  1:00 PM [facility] IR  [facility] Radiolog   3/3/2023  1:00 PM [facility] IR  [facility] Radiolog   3/10/2023  1:00 PM [facility] IR  [facility] Radiolog   3/17/2023  1:00 PM [facility] IR  [facility] Radiolog   3/24/2023  1:00 PM ST[facility] IR  [facility] Radiolog   3/31/2023  1:00 PM [facility] IR  [facility] Radiolog   2023  1:00 PM [facility] IR  [facility] Radiolog   2023  1:00 PM [facility] IR  [facility] Radiolog 4/21/2023  1:00 PM [facility] IR  [facility] Radiolog   4/28/2023  1:00 PM [facility] IR  [facility] Radiolog   5/5/2023  1:00 PM [facility] IR  [facility] Radiolog   5/12/2023  1:00 PM [facility] IR  [facility] Radiolog   5/19/2023  1:00 PM [facility] IR  [facility] Radiolog   5/26/2023  1:00 PM [facility] IR  [facility] Radiolog   6/2/2023  1:00 PM [facility] IR  [facility] Radiolog   6/9/2023  1:00 PM [facility] IR  [facility] Radiolog   6/16/2023  1:00 PM [facility]C IR  [facility] Radiolog   6/23/2023  1:00 PM [facility] IR  [facility] Radiolog     Plan for follow-up call in 5-7 days based on severity of symptoms and risk factors. Plan for next call:  check on congestion, swelling, shortness of breath.     Andressa Rangel, RN BSN   Care Transitions Nurse  [phone]

## 2022-12-22 ENCOUNTER — CARE COORDINATION (OUTPATIENT)
Dept: CASE MANAGEMENT | Age: 62
End: 2022-12-22

## 2022-12-22 NOTE — CARE COORDINATION
Care Transitions Outreach Attempt    Call within 2 business days of discharge: No   Attempted to reach patient for transitions of care follow up. Unable to reach patient. 2nd and final attempt. Episode resolved. Patient: Darling Nguyen Patient : 1960 MRN: [id number]    Last Discharge  [address]       Date Complaint Diagnosis Description Type Department Provider    22 Shortness of Breath Ascites of liver ED (DISCHARGE) [facility] Raad Naik MD              Was this an external facility discharge?  No Discharge Facility: [facility]    Noted following upcoming appointments from discharge chart review:   [facility] follow up appointment(s):   Future Appointments   Date Time Provider Carolyn English   2022  1:00 PM [facility] IR  [facility] Radiolog   2022  1:00 PM [facility] IR  [facility] Radiolog   2023  1:00 PM ST[facility] IR [address] Radiolog   2023 11:00 AM Nelly Lesches, PA [facility]   2023  1:00 PM [facility] IR  [facility] Radiolog   2023  1:00 PM ST[facility] IR  [facility] Radiolog   2023  1:00 PM [facility] IR  [facility] Radiolog   2/3/2023  1:00 PM [facility] IR  [facility] Radiolog   2023  1:00 PM Romel Murray MD [facility]   2/10/2023  1:00 PM STC IR  [facility] Radiolog   2023  1:15 PM Eduardo Winslow MD [facility]   2023  1:00 PM STC IR  [facility] Radiolog   2023  1:00 PM [facility] IR  [facility] Radiolog   3/3/2023  1:00 PM [facility] IR  [facility] Radiolog   3/10/2023  1:00 PM STC IR  [facility] Radiolog   3/17/2023  1:00 PM STC IR  [facility] Radiolog   3/24/2023  1:00 PM STC IR  [facility] Radiolog   3/31/2023  1:00 PM [facility]C IR  [facility] Radiolog   2023  1:00 PM [facility] IR  [facility] Radiolog   2023  1:00 PM [facility] IR  [facility] Radiolog 4/21/2023  1:00 PM [facility] IR  [facility] Radiolog   4/28/2023  1:00 PM [facility]C IR  [facility] Radiolog   5/5/2023  1:00 PM [facility]C IR  [facility] Radiolog   5/12/2023  1:00 PM STC IR  [facility] Radiolog   5/19/2023  1:00 PM [facility]C IR  [facility] Radiolog   5/26/2023  1:00 PM [facility]C IR  [facility] Radiolog   6/2/2023  1:00 PM [facility] IR  [facility] Radiolog   6/9/2023  1:00 PM [facility] IR  [facility] Radiolog   6/16/2023  1:00 PM [facility]C IR  [facility] Radiolog   6/23/2023  1:00 PM [facility] IR  [facility] Radiolog     Non-[facility] follow up appointment(s): n/a

## 2022-12-23 ENCOUNTER — HOSPITAL ENCOUNTER (OUTPATIENT)
Dept: INTERVENTIONAL RADIOLOGY/VASCULAR | Age: 62
End: 2022-12-23
Payer: MEDICARE

## 2022-12-23 VITALS
OXYGEN SATURATION: 99 % | RESPIRATION RATE: 16 BRPM | DIASTOLIC BLOOD PRESSURE: 68 MMHG | HEART RATE: 106 BPM | SYSTOLIC BLOOD PRESSURE: 119 MMHG

## 2022-12-23 DIAGNOSIS — K70.31 ALCOHOLIC CIRRHOSIS OF LIVER WITH ASCITES (HCC): ICD-10-CM

## 2022-12-23 PROCEDURE — 2709999900 IR US GUIDED PARACENTESIS

## 2022-12-23 PROCEDURE — 49083 ABD PARACENTESIS W/IMAGING: CPT

## 2022-12-23 NOTE — PROGRESS NOTES
Patient tolerated paracentesis without distress. 4800 ml of cloudy, yellow fluid removed. Dry dressing to site. Patient to the waiting room.  Discharge instructions given, no questions at this time

## 2022-12-27 ENCOUNTER — TELEPHONE (OUTPATIENT)
Dept: GASTROENTEROLOGY | Age: 62
End: 2022-12-27

## 2022-12-27 ENCOUNTER — APPOINTMENT (OUTPATIENT)
Dept: GENERAL RADIOLOGY | Age: 62
DRG: 378 | End: 2022-12-27
Payer: MEDICARE

## 2022-12-27 ENCOUNTER — HOSPITAL ENCOUNTER (INPATIENT)
Age: 62
LOS: 1 days | Discharge: ANOTHER HOSPITAL | DRG: 378 | End: 2022-12-28
Attending: STUDENT IN AN ORGANIZED HEALTH CARE EDUCATION/TRAINING PROGRAM | Admitting: INTERNAL MEDICINE
Payer: MEDICARE

## 2022-12-27 ENCOUNTER — APPOINTMENT (OUTPATIENT)
Dept: CT IMAGING | Age: 62
DRG: 378 | End: 2022-12-27
Payer: MEDICARE

## 2022-12-27 DIAGNOSIS — K70.9 ALCOHOLIC LIVER DISEASE (HCC): ICD-10-CM

## 2022-12-27 DIAGNOSIS — R11.0 NAUSEA: ICD-10-CM

## 2022-12-27 DIAGNOSIS — R10.84 GENERALIZED ABDOMINAL PAIN: Primary | ICD-10-CM

## 2022-12-27 PROBLEM — Z71.89 DNR (DO NOT RESUSCITATE) DISCUSSION: Status: RESOLVED | Noted: 2022-10-31 | Resolved: 2022-12-27

## 2022-12-27 LAB
ABSOLUTE EOS #: 0.07 K/UL (ref 0–0.4)
ABSOLUTE LYMPH #: 0.22 K/UL (ref 1–4.8)
ABSOLUTE MONO #: 0.14 K/UL (ref 0.1–1.3)
ALBUMIN SERPL-MCNC: 2.3 G/DL (ref 3.5–5.2)
ALP BLD-CCNC: 209 U/L (ref 40–129)
ALT SERPL-CCNC: 16 U/L (ref 5–41)
AMMONIA: 41 UMOL/L (ref 16–60)
ANION GAP SERPL CALCULATED.3IONS-SCNC: 10 MMOL/L (ref 9–17)
AST SERPL-CCNC: 45 U/L
BASOPHILS # BLD: 0 % (ref 0–2)
BASOPHILS ABSOLUTE: 0 K/UL (ref 0–0.2)
BILIRUB SERPL-MCNC: 2.3 MG/DL (ref 0.3–1.2)
BUN BLDV-MCNC: 10 MG/DL (ref 8–23)
CALCIUM SERPL-MCNC: 7.5 MG/DL (ref 8.6–10.4)
CHLORIDE BLD-SCNC: 98 MMOL/L (ref 98–107)
CO2: 21 MMOL/L (ref 20–31)
CREAT SERPL-MCNC: 0.53 MG/DL (ref 0.7–1.2)
EOSINOPHILS RELATIVE PERCENT: 2 % (ref 0–4)
GFR SERPL CREATININE-BSD FRML MDRD: >60 ML/MIN/1.73M2
GLUCOSE BLD-MCNC: 102 MG/DL (ref 70–99)
HCT VFR BLD CALC: 16.6 % (ref 41–53)
HEMOGLOBIN: 5 G/DL (ref 13.5–17.5)
LIPASE: 18 U/L (ref 13–60)
LYMPHOCYTES # BLD: 6 % (ref 24–44)
MAGNESIUM: 1.5 MG/DL (ref 1.6–2.6)
MCH RBC QN AUTO: 23.4 PG (ref 26–34)
MCHC RBC AUTO-ENTMCNC: 29.9 G/DL (ref 31–37)
MCV RBC AUTO: 78.3 FL (ref 80–100)
MONOCYTES # BLD: 4 % (ref 1–7)
MORPHOLOGY: ABNORMAL
MORPHOLOGY: ABNORMAL
PDW BLD-RTO: 18.4 % (ref 11.5–14.9)
PLATELET # BLD: 74 K/UL (ref 150–450)
PMV BLD AUTO: 7.2 FL (ref 6–12)
POTASSIUM SERPL-SCNC: 3.8 MMOL/L (ref 3.7–5.3)
RBC # BLD: 2.12 M/UL (ref 4.5–5.9)
SEG NEUTROPHILS: 88 % (ref 36–66)
SEGMENTED NEUTROPHILS ABSOLUTE COUNT: 3.17 K/UL (ref 1.3–9.1)
SODIUM BLD-SCNC: 129 MMOL/L (ref 135–144)
TOTAL PROTEIN: 4.5 G/DL (ref 6.4–8.3)
WBC # BLD: 3.6 K/UL (ref 3.5–11)

## 2022-12-27 PROCEDURE — 82140 ASSAY OF AMMONIA: CPT

## 2022-12-27 PROCEDURE — 2060000000 HC ICU INTERMEDIATE R&B

## 2022-12-27 PROCEDURE — 96372 THER/PROPH/DIAG INJ SC/IM: CPT

## 2022-12-27 PROCEDURE — 36430 TRANSFUSION BLD/BLD COMPNT: CPT

## 2022-12-27 PROCEDURE — 74177 CT ABD & PELVIS W/CONTRAST: CPT

## 2022-12-27 PROCEDURE — 96361 HYDRATE IV INFUSION ADD-ON: CPT

## 2022-12-27 PROCEDURE — 99285 EMERGENCY DEPT VISIT HI MDM: CPT

## 2022-12-27 PROCEDURE — 83735 ASSAY OF MAGNESIUM: CPT

## 2022-12-27 PROCEDURE — 96365 THER/PROPH/DIAG IV INF INIT: CPT

## 2022-12-27 PROCEDURE — 85025 COMPLETE CBC W/AUTO DIFF WBC: CPT

## 2022-12-27 PROCEDURE — 71045 X-RAY EXAM CHEST 1 VIEW: CPT

## 2022-12-27 PROCEDURE — 86900 BLOOD TYPING SEROLOGIC ABO: CPT

## 2022-12-27 PROCEDURE — 86901 BLOOD TYPING SEROLOGIC RH(D): CPT

## 2022-12-27 PROCEDURE — 96375 TX/PRO/DX INJ NEW DRUG ADDON: CPT

## 2022-12-27 PROCEDURE — 2500000003 HC RX 250 WO HCPCS: Performed by: STUDENT IN AN ORGANIZED HEALTH CARE EDUCATION/TRAINING PROGRAM

## 2022-12-27 PROCEDURE — 83690 ASSAY OF LIPASE: CPT

## 2022-12-27 PROCEDURE — 6360000002 HC RX W HCPCS: Performed by: STUDENT IN AN ORGANIZED HEALTH CARE EDUCATION/TRAINING PROGRAM

## 2022-12-27 PROCEDURE — 86920 COMPATIBILITY TEST SPIN: CPT

## 2022-12-27 PROCEDURE — 2580000003 HC RX 258: Performed by: STUDENT IN AN ORGANIZED HEALTH CARE EDUCATION/TRAINING PROGRAM

## 2022-12-27 PROCEDURE — 93005 ELECTROCARDIOGRAM TRACING: CPT

## 2022-12-27 PROCEDURE — 36415 COLL VENOUS BLD VENIPUNCTURE: CPT

## 2022-12-27 PROCEDURE — 96374 THER/PROPH/DIAG INJ IV PUSH: CPT

## 2022-12-27 PROCEDURE — 6360000002 HC RX W HCPCS: Performed by: NURSE PRACTITIONER

## 2022-12-27 PROCEDURE — P9016 RBC LEUKOCYTES REDUCED: HCPCS

## 2022-12-27 PROCEDURE — 86850 RBC ANTIBODY SCREEN: CPT

## 2022-12-27 PROCEDURE — 80053 COMPREHEN METABOLIC PANEL: CPT

## 2022-12-27 PROCEDURE — 6360000004 HC RX CONTRAST MEDICATION: Performed by: STUDENT IN AN ORGANIZED HEALTH CARE EDUCATION/TRAINING PROGRAM

## 2022-12-27 RX ORDER — FLUOXETINE HYDROCHLORIDE 20 MG/1
20 CAPSULE ORAL DAILY
Status: DISCONTINUED | OUTPATIENT
Start: 2022-12-28 | End: 2022-12-28

## 2022-12-27 RX ORDER — ONDANSETRON 4 MG/1
4 TABLET, ORALLY DISINTEGRATING ORAL EVERY 8 HOURS PRN
Status: DISCONTINUED | OUTPATIENT
Start: 2022-12-27 | End: 2022-12-28 | Stop reason: HOSPADM

## 2022-12-27 RX ORDER — FENTANYL CITRATE 0.05 MG/ML
25 INJECTION, SOLUTION INTRAMUSCULAR; INTRAVENOUS
Status: DISCONTINUED | OUTPATIENT
Start: 2022-12-27 | End: 2022-12-28 | Stop reason: HOSPADM

## 2022-12-27 RX ORDER — SODIUM CHLORIDE 0.9 % (FLUSH) 0.9 %
5-40 SYRINGE (ML) INJECTION PRN
Status: DISCONTINUED | OUTPATIENT
Start: 2022-12-27 | End: 2022-12-28 | Stop reason: HOSPADM

## 2022-12-27 RX ORDER — FERROUS SULFATE 325(65) MG
325 TABLET ORAL 2 TIMES DAILY WITH MEALS
Status: DISCONTINUED | OUTPATIENT
Start: 2022-12-28 | End: 2022-12-28 | Stop reason: HOSPADM

## 2022-12-27 RX ORDER — ATORVASTATIN CALCIUM 20 MG/1
20 TABLET, FILM COATED ORAL NIGHTLY
Status: DISCONTINUED | OUTPATIENT
Start: 2022-12-27 | End: 2022-12-28 | Stop reason: HOSPADM

## 2022-12-27 RX ORDER — NADOLOL 20 MG/1
20 TABLET ORAL DAILY
Status: DISCONTINUED | OUTPATIENT
Start: 2022-12-28 | End: 2022-12-28 | Stop reason: HOSPADM

## 2022-12-27 RX ORDER — 0.9 % SODIUM CHLORIDE 0.9 %
100 INTRAVENOUS SOLUTION INTRAVENOUS ONCE
Status: COMPLETED | OUTPATIENT
Start: 2022-12-27 | End: 2022-12-27

## 2022-12-27 RX ORDER — SODIUM CHLORIDE 0.9 % (FLUSH) 0.9 %
5-40 SYRINGE (ML) INJECTION EVERY 12 HOURS SCHEDULED
Status: DISCONTINUED | OUTPATIENT
Start: 2022-12-27 | End: 2022-12-28 | Stop reason: HOSPADM

## 2022-12-27 RX ORDER — FENTANYL CITRATE 0.05 MG/ML
50 INJECTION, SOLUTION INTRAMUSCULAR; INTRAVENOUS ONCE
Status: COMPLETED | OUTPATIENT
Start: 2022-12-27 | End: 2022-12-27

## 2022-12-27 RX ORDER — LACTULOSE 10 G/15ML
20 SOLUTION ORAL 2 TIMES DAILY
Status: DISCONTINUED | OUTPATIENT
Start: 2022-12-28 | End: 2022-12-28 | Stop reason: HOSPADM

## 2022-12-27 RX ORDER — SODIUM CHLORIDE 9 MG/ML
INJECTION, SOLUTION INTRAVENOUS PRN
Status: DISCONTINUED | OUTPATIENT
Start: 2022-12-27 | End: 2022-12-28 | Stop reason: HOSPADM

## 2022-12-27 RX ORDER — ONDANSETRON 2 MG/ML
4 INJECTION INTRAMUSCULAR; INTRAVENOUS ONCE
Status: COMPLETED | OUTPATIENT
Start: 2022-12-27 | End: 2022-12-27

## 2022-12-27 RX ORDER — ACETAMINOPHEN 650 MG/1
650 SUPPOSITORY RECTAL EVERY 6 HOURS PRN
Status: DISCONTINUED | OUTPATIENT
Start: 2022-12-27 | End: 2022-12-28

## 2022-12-27 RX ORDER — PROMETHAZINE HYDROCHLORIDE 25 MG/ML
12.5 INJECTION, SOLUTION INTRAMUSCULAR; INTRAVENOUS ONCE
Status: COMPLETED | OUTPATIENT
Start: 2022-12-27 | End: 2022-12-27

## 2022-12-27 RX ORDER — SODIUM CHLORIDE 0.9 % (FLUSH) 0.9 %
10 SYRINGE (ML) INJECTION PRN
Status: DISCONTINUED | OUTPATIENT
Start: 2022-12-27 | End: 2022-12-28 | Stop reason: HOSPADM

## 2022-12-27 RX ORDER — MAGNESIUM SULFATE HEPTAHYDRATE 40 MG/ML
2000 INJECTION, SOLUTION INTRAVENOUS ONCE
Status: COMPLETED | OUTPATIENT
Start: 2022-12-27 | End: 2022-12-27

## 2022-12-27 RX ORDER — SPIRONOLACTONE 25 MG/1
100 TABLET ORAL EVERY EVENING
Status: DISCONTINUED | OUTPATIENT
Start: 2022-12-28 | End: 2022-12-28 | Stop reason: HOSPADM

## 2022-12-27 RX ORDER — FUROSEMIDE 20 MG/1
20 TABLET ORAL 2 TIMES DAILY
Status: DISCONTINUED | OUTPATIENT
Start: 2022-12-28 | End: 2022-12-28 | Stop reason: HOSPADM

## 2022-12-27 RX ORDER — ONDANSETRON 2 MG/ML
4 INJECTION INTRAMUSCULAR; INTRAVENOUS EVERY 6 HOURS PRN
Status: DISCONTINUED | OUTPATIENT
Start: 2022-12-27 | End: 2022-12-28 | Stop reason: HOSPADM

## 2022-12-27 RX ORDER — PANTOPRAZOLE SODIUM 40 MG/1
40 TABLET, DELAYED RELEASE ORAL DAILY
Status: DISCONTINUED | OUTPATIENT
Start: 2022-12-28 | End: 2022-12-28

## 2022-12-27 RX ORDER — ACETAMINOPHEN 325 MG/1
650 TABLET ORAL EVERY 6 HOURS PRN
Status: DISCONTINUED | OUTPATIENT
Start: 2022-12-27 | End: 2022-12-28

## 2022-12-27 RX ADMIN — ONDANSETRON 4 MG: 2 INJECTION INTRAMUSCULAR; INTRAVENOUS at 18:10

## 2022-12-27 RX ADMIN — SODIUM CHLORIDE, PRESERVATIVE FREE 10 ML: 5 INJECTION INTRAVENOUS at 18:37

## 2022-12-27 RX ADMIN — IOPAMIDOL 75 ML: 755 INJECTION, SOLUTION INTRAVENOUS at 18:37

## 2022-12-27 RX ADMIN — PROMETHAZINE HYDROCHLORIDE 12.5 MG: 25 INJECTION INTRAMUSCULAR; INTRAVENOUS at 20:10

## 2022-12-27 RX ADMIN — FENTANYL CITRATE 50 MCG: 0.05 INJECTION, SOLUTION INTRAMUSCULAR; INTRAVENOUS at 18:08

## 2022-12-27 RX ADMIN — SODIUM CHLORIDE 100 ML: 9 INJECTION, SOLUTION INTRAVENOUS at 18:37

## 2022-12-27 RX ADMIN — MAGNESIUM SULFATE HEPTAHYDRATE 2000 MG: 40 INJECTION, SOLUTION INTRAVENOUS at 20:11

## 2022-12-27 RX ADMIN — FENTANYL CITRATE 25 MCG: 0.05 INJECTION, SOLUTION INTRAMUSCULAR; INTRAVENOUS at 22:13

## 2022-12-27 ASSESSMENT — PAIN SCALES - GENERAL: PAINLEVEL_OUTOF10: 7

## 2022-12-27 NOTE — ED PROVIDER NOTES
[facility]  Emergency Department Encounter  EmergencyMedicine Resident     Pt Name:Frank Soler  MRN: [id number]  [place] 1960  Date of evaluation: 12/27/22  PCP:  VASU Esteves    This patient was evaluated in the Emergency Department for symptoms described in the history of present illness. The patient was evaluated in the context of the global COVID-19 pandemic, which necessitated consideration that the patient might be at risk for infection with the SARS-CoV-2 virus that causes COVID-19. Institutional protocols and algorithms that pertain to the evaluation of patients at risk for COVID-19 are in a state of rapid change based on information released by regulatory bodies including the CDC and federal and state organizations. These policies and algorithms were followed during the patient's care in the ED. CHIEF COMPLAINT       Chief Complaint   Patient presents with    Abdominal Pain       HISTORY OF PRESENT ILLNESS  (Location/Symptom, Timing/Onset, Context/Setting, Quality, Duration, Modifying Factors, Severity.)      Mary Colon is a 60 y.o. male who presents with abdominal pain, nausea. Patient had recent TIPS procedure, he has a history of alcoholic cirrhosis with ascites. He gets paracenteses every Friday. He recently also had a thoracentesis. Patient is currently hemodynamically stable at bedside not in any acute distress. Is tachycardic and elevated blood pressures. No vomiting, no report of dark stools.   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 ([address] Alcoholic cirrhosis of liver with ascites ([address] Anemia, Anxiety, Arthritis, Back pain, chronic, [place] esophagus, BPH (benign prostatic hypertrophy), Cholelithiasis, Cirrhosis ([address] COVID-19, COVID-19 vaccine series completed, DDD (degenerative disc disease), lumbar, Depression, Esophageal cancer ([address] Esophageal varices ([address] Fatty liver, GERD (gastroesophageal reflux disease), GI bleed, Hep C w/o coma, chronic ([address] History of alcohol abuse, History of blood transfusion, History of colon polyps, History of tobacco abuse, [place] (hard of hearing), Hyperlipidemia, Hypertension, Hyponatremia, Hypotension, PONV (postoperative nausea and vomiting), Port-A-Cath in place, Portal hypertension ([address] Sciatica, Secondary esophageal varices ([address] Shortness of breath, Spinal stenosis, Stomach ulcer, Thrombocytopenia ([address] Tubular adenoma of colon, Vitamin D deficiency, and Wears glasses. has a past surgical history that includes Bunionectomy; Nasal septum surgery; other surgical history (01/04/2016); Colonoscopy; Colonoscopy (10/05/2016); other surgical history (11/21/2016); other surgical history (12/19/2016); knee surgery (Left); Bunionectomy (Left); Endoscopy, colon, diagnostic; pr revise median n/carpal tunnel surg (Right, 08/29/2017); Carpal tunnel release (Right); pr revise median n/carpal tunnel surg (Left, 10/31/2017); Colonoscopy (N/A, 03/30/2018); Colonoscopy (03/30/2018); pr njx aa&/strd tfrml epi lumbar/sacral 1 level (Bilateral, 09/06/2018); other surgical history (09/28/2018); pr njx aa&/strd tfrml epi lumbar/sacral 1 level (N/A, 09/28/2018); Pain management procedure (Left, 07/09/2020); Pain management procedure (Left, 07/20/2020); Pain management procedure (Bilateral, 08/17/2020); other surgical history (Right, 11/23/2020); Nerve Block (Right, 11/23/2020); Pain management procedure (Bilateral, 12/07/2020); Upper gastrointestinal endoscopy (N/A, 12/29/2020); Upper gastrointestinal endoscopy (N/A, 02/02/2021); Upper gastrointestinal endoscopy (N/A, 02/12/2021); Upper gastrointestinal endoscopy (02/12/2021); [place] tooth extraction; IR PORT PLACEMENT > 5 YEARS (04/19/2021); Upper gastrointestinal endoscopy (N/A, 08/31/2021); Upper gastrointestinal endoscopy (N/A, 01/21/2022); Upper gastrointestinal endoscopy (N/A, 04/15/2022);  Colonoscopy (N/A, 2022); Upper gastrointestinal endoscopy (N/A, 2022); Upper gastrointestinal endoscopy (N/A, 2022); Paracentesis; Upper gastrointestinal endoscopy (N/A, 2022); Upper gastrointestinal endoscopy (N/A, 10/19/2022); Upper gastrointestinal endoscopy (N/A, 10/31/2022); and IR TIPS INSERTION (2022).       Social History     Socioeconomic History    Marital status: Single     Spouse name: Not on file    Number of children: Not on file    Years of education: Not on file    Highest education level: Not on file   Occupational History    Not on file   Tobacco Use    Smoking status: Former     Packs/day: 1.00     Years: 45.00     Pack years: 45.00     Types: Cigarettes     Quit date: 2017     Years since quittin.9    Smokeless tobacco: Never   Vaping Use    Vaping Use: Never used   Substance and Sexual Activity    Alcohol use: Not Currently     Comment: Quit alcohol in 2019- heavier drinking prior to quitting    Drug use: Not Currently     Frequency: 1.0 times per week     Types: Cocaine     Comment: Cocaine- stopped spring 2016    Sexual activity: Yes     Partners: Female   Other Topics Concern    Not on file   Social History Narrative     in the past, retired     Social Determinants of Health     Financial Resource Strain: Low Risk     Difficulty of Paying Living Expenses: Not hard at all   Food Insecurity: No Food Insecurity    Worried About [address] in the Last Year: Never true    [facility] in the Last Year: Never true   Transportation Needs: Not on file   Physical Activity: Inactive    Days of Exercise per Week: 0 days    Minutes of Exercise per Session: 0 min   Stress: Not on file   Social Connections: Not on file   Intimate Partner Violence: Not on file   Housing Stability: Not on file       Family History   Problem Relation Age of Onset    Cancer Mother         pancreatic    Cancer Father         bone    Diabetes Sister     Asthma Brother     Allergies Brother         MULTIPLE       Allergies:  Patient has no known allergies. Home Medications:  Prior to Admission medications    Medication Sig Start Date End Date Taking? Authorizing Provider   diphenhydrAMINE (BENADRYL) 25 MG tablet Take 1 tablet by mouth every 6 hours as needed for Itching  Patient not taking: No sig reported 12/2/22 1/1/23  Juan C COLE Blood, DO   pantoprazole (PROTONIX) 40 MG tablet Take 40 mg by mouth daily 10/4/22   Historical Provider, MD   FEROSUL 325 (65 Fe) MG tablet take 1 tablet by mouth twice a day 10/7/22   VASU Salinas   nadolol (CORGARD) 20 MG tablet take 1 tablet by mouth once daily 8/26/22   MASSIMO Browning NP   midodrine (PROAMATINE) 5 MG tablet Take 2 tablets by mouth in the morning and 2 tablets at noon and 2 tablets before bedtime.   Patient not taking: Reported on 12/27/2022 8/4/22   MASSIMO Browning NP   furosemide (LASIX) 20 MG tablet Take 1 tablet by mouth 2 times daily 6/23/22   MASSIMO Browning NP   spironolactone (ALDACTONE) 100 MG tablet Take 1 tablet by mouth every evening 6/23/22   MASSIMO Browning NP   ondansetron (ZOFRAN-ODT) 4 MG disintegrating tablet dissolve 1 tablet by mouth every 8 hours if needed for nausea and vomiting  Patient not taking: No sig reported 5/31/22   VASU Salinas   lactulose [facility]) 10 GM/15ML solution take 30 milliliters by mouth three times a day 5/31/22   VASU Salinas   FLUoxetine (PROZAC) 20 MG capsule Take 1 capsule by mouth daily 4/21/22   Sara Gerber MD   atorvastatin (LIPITOR) 20 MG tablet Take 1 tablet by mouth nightly 4/21/22   Sara Gerber MD       REVIEW OF SYSTEMS    (2-9 systems for level 4, 10 or more for level 5)      Review of Systems    PHYSICAL EXAM   (up to 7 for level 4, 8 or more for level 5)      INITIAL VITALS:   BP (!) 144/69   Pulse (!) 109   Temp 97.9 °F (36.6 °C) (Oral)   Resp 22   Ht 5' 10\" (1.778 m)   Wt 195 lb (88.5 kg)   SpO2 97%   BMI 27.98 kg/m²     Physical Exam    DIFFERENTIAL  DIAGNOSIS     PLAN (LABS / IMAGING / EKG):  Orders Placed This Encounter   Procedures    XR CHEST (SINGLE VIEW FRONTAL)    CT ABDOMEN PELVIS W IV CONTRAST Additional Contrast? None    CBC with Auto Differential    Comprehensive Metabolic Panel w/ Reflex to MG    Lipase    Magnesium    Ammonia    Path Review, Smear    Hemoglobin and Hematocrit    Protime-INR    APTT    Iron and TIBC    Basic Metabolic Panel w/ Reflex to MG    ADULT DIET;  Clear Liquid    Telemetry monitoring - 72 hour duration    Vital signs per unit routine    Nursing to document all stools for color and consistency    Place intermittent pneumatic compression device    Admission/Observation order previously placed    Notify physician    Up as tolerated    Full Code    Inpatient consult to Internal Medicine    Initiate Oxygen Therapy Protocol    EKG 12 Lead    TYPE AND CROSSMATCH    ADMIT TO INPATIENT       MEDICATIONS ORDERED:  Orders Placed This Encounter   Medications    ondansetron (ZOFRAN) injection 4 mg    fentaNYL (SUBLIMAZE) injection 50 mcg    iopamidol (ISOVUE-370) 76 % injection 75 mL    0.9 % sodium chloride bolus    sodium chloride flush 0.9 % injection 10 mL    magnesium sulfate 2000 mg in water 50 mL IVPB    promethazine (PHENERGAN) injection 12.5 mg    atorvastatin (LIPITOR) tablet 20 mg    ferrous sulfate (IRON 325) tablet 325 mg    FLUoxetine (PROZAC) capsule 20 mg    furosemide (LASIX) tablet 20 mg    lactulose (CHRONULAC) 10 GM/15ML solution 20 g    nadolol (CORGARD) tablet 20 mg    pantoprazole (PROTONIX) tablet 40 mg    spironolactone (ALDACTONE) tablet 100 mg    fentaNYL (SUBLIMAZE) injection 25 mcg    sodium chloride flush 0.9 % injection 5-40 mL    sodium chloride flush 0.9 % injection 5-40 mL    0.9 % sodium chloride infusion    OR Linked Order Group     ondansetron (ZOFRAN-ODT) disintegrating tablet 4 mg     ondansetron (ZOFRAN) injection 4 mg    OR Linked Order Group acetaminophen (TYLENOL) tablet 650 mg     acetaminophen (TYLENOL) suppository 650 mg           DIAGNOSTIC RESULTS / EMERGENCY DEPARTMENT COURSE / MDM   LAB RESULTS:  Results for orders placed or performed during the hospital encounter of 12/27/22   CBC with Auto Differential   Result Value Ref Range    WBC 3.6 3.5 - 11.0 k/uL    RBC 2.12 (L) 4.5 - 5.9 m/uL    Hemoglobin 5.0 (LL) 13.5 - 17.5 g/dL    Hematocrit 16.6 (L) 41 - 53 %    MCV 78.3 (L) 80 - 100 fL    MCH 23.4 (L) 26 - 34 pg    MCHC 29.9 (L) 31 - 37 g/dL    RDW 18.4 (H) 11.5 - 14.9 %    Platelets 74 (L) 888 - 450 k/uL    MPV 7.2 6.0 - 12.0 fL    Seg Neutrophils 88 (H) 36 - 66 %    Lymphocytes 6 (L) 24 - 44 %    Monocytes 4 1 - 7 %    Eosinophils % 2 0 - 4 %    Basophils 0 0 - 2 %    Segs Absolute 3.17 1.3 - 9.1 k/uL    Absolute Lymph # 0.22 (L) 1.0 - 4.8 k/uL    Absolute Mono # 0.14 0.1 - 1.3 k/uL    Absolute Eos # 0.07 0.0 - 0.4 k/uL    Basophils Absolute 0.00 0.0 - 0.2 k/uL    Morphology ANISOCYTOSIS PRESENT     Morphology HYPOCHROMIA PRESENT    Comprehensive Metabolic Panel w/ Reflex to MG   Result Value Ref Range    Glucose 102 (H) 70 - 99 mg/dL    BUN 10 8 - 23 mg/dL    Creatinine 0.53 (L) 0.70 - 1.20 mg/dL    Est, Glom Filt Rate >60 >60 mL/min/1.73m2    Calcium 7.5 (L) 8.6 - 10.4 mg/dL    Sodium 129 (L) 135 - 144 mmol/L    Potassium 3.8 3.7 - 5.3 mmol/L    Chloride 98 98 - 107 mmol/L    CO2 21 20 - 31 mmol/L    Anion Gap 10 9 - 17 mmol/L    Alkaline Phosphatase 209 (H) 40 - 129 U/L    ALT 16 5 - 41 U/L    AST 45 (H) <40 U/L    Total Bilirubin 2.3 (H) 0.3 - 1.2 mg/dL    Total Protein 4.5 (L) 6.4 - 8.3 g/dL    Albumin 2.3 (L) 3.5 - 5.2 g/dL   Lipase   Result Value Ref Range    Lipase 18 13 - 60 U/L   Magnesium   Result Value Ref Range    Magnesium 1.5 (L) 1.6 - 2.6 mg/dL   Ammonia   Result Value Ref Range    Ammonia 41 16 - 60 umol/L   TYPE AND CROSSMATCH   Result Value Ref Range    Expiration Date 12/30/2022,9085     Arm Band Number [id number]     ABO/Rh AB POSITIVE     Antibody Screen NEGATIVE     Unit Number [id number]     Product Code Leukocyte Reduced Red Cell     Unit Divison 00     Dispense Status ALLOCATED     Transfusion Status OK TO TRANSFUSE     Crossmatch Result COMPATIBLE     Unit Number [id number]     Product Code Leukocyte Reduced Red Cell     Unit Divison 00     Dispense Status ALLOCATED     Transfusion Status OK TO TRANSFUSE     Crossmatch Result COMPATIBLE          RADIOLOGY:  XR CHEST (SINGLE VIEW FRONTAL)    Result Date: 12/27/2022  1. Minimal bibasilar atelectatic changes with trace left pleural fluid which is slightly decreased from 12/09/2022. XR CHEST (SINGLE VIEW FRONTAL)    Result Date: 12/9/2022  1. Small left pleural effusion with some subsegmental atelectasis. 2. Trace right pleural effusion. XR CHEST (2 VW)    Result Date: 12/9/2022  Small-moderate bilateral pleural effusions and bilateral lower lobe compressive atelectasis. CT ABDOMEN PELVIS W IV CONTRAST Additional Contrast? None    Result Date: 12/27/2022  Stable circumferential wall thickening of the distal esophagus and gastroesophageal junction consistent with known esophageal malignancy. No significant change since prior examination. No esophageal obstruction. Cirrhotic liver with associated portal venous hypertension, progressive ascites and stable splenomegaly. TIPS appears in place and patent Cholelithiasis Interval development of bibasal infiltrates and moderate bilateral pleural effusions as well as mild-to-moderate pericardial effusion     IR TIPS INSERTION    Result Date: 12/1/2022  Successful placement of tips shunt 5 cm via tore graft from right hepatic vein to left portal vein with good flow. IR US GUIDED PARACENTESIS    Result Date: 12/23/2022  Successful ultrasound-guided paracentesis. IR US GUIDED PARACENTESIS    Result Date: 12/16/2022  Successful ultrasound-guided paracentesis.      IR US GUIDED PARACENTESIS    Result Date: 12/9/2022  Successful paracentesis     IR GUIDED THORACENTESIS PLEURAL    Result Date: 12/9/2022  Successful ultrasound guided left thoracentesis. EMERGENCY DEPARTMENT COURSE:  ED Course as of 12/27/22 2325   Tue Dec 27, 2022   165 16-year-old male, recent TIPS procedure coming in with worsening abdominal pain, nausea. Patient has chronic liver disease from alcohol use. Patient states he gets his abdomen tapped every Friday, he is also had a pleural effusion which was also tapped recently. Currently hemodynamically stable, tachycardic however not hypoxic, afebrile. Physical exam unremarkable for abdominal tenderness. No point tenderness, no right upper or lower quadrant tenderness. Will order labs, CT to evaluate for shunt placement. Zofran for nausea, fentanyl for pain. [KK]   1745 Hemoglobin of 5.0, will transfuse 2 units. Anticipate admission. [KK]      ED Course User Index  301 Emil Baum DO     CONSULTS:  IP CONSULT TO INTERNAL MEDICINE      Assessment/Plan: 16-year-old male, recent history of seizure, alcoholic cirrhosis. Treated with Zofran, had hemoglobin of 5, transfused 2 units in the ED. Admit to internal medicine, will require GI evaluation, monitoring CBC. No concern for SBP at this time, patient is afebrile nontoxic-appearing, does not appear septic. Magnesium down to 1.5 in the ED, repleted. FINAL IMPRESSION      1. Generalized abdominal pain    2. Alcoholic liver disease ([address]    3. Nausea          DISPOSITION / PLAN     DISPOSITION Admitted 12/27/2022 09:13:12 PM      PATIENT REFERRED TO:  No follow-up provider specified.     DISCHARGE MEDICATIONS:  Current Discharge Medication List          Adam Lassiter DO  Emergency Medicine Resident    (Please note that portions of thisnote were completed with a voice recognition program.  Efforts were made to edit the dictations but occasionally words are mis-transcribed.)        Barbara Ricardo DO  Resident  12/27/22 Vaughn Deleon

## 2022-12-27 NOTE — TELEPHONE ENCOUNTER
Patient called stating he has no been feeling good at all since the tips procedure. He states he has been very light headed  almost like fainting, nauseated and no energy. Writer let patient know that there has been a bad flu going around with the same symptoms and he should go to the ER and be checked out. He stated he was thinking the same thing.

## 2022-12-28 ENCOUNTER — APPOINTMENT (OUTPATIENT)
Dept: ULTRASOUND IMAGING | Age: 62
DRG: 378 | End: 2022-12-28
Payer: MEDICARE

## 2022-12-28 ENCOUNTER — APPOINTMENT (OUTPATIENT)
Dept: INTERVENTIONAL RADIOLOGY/VASCULAR | Age: 62
DRG: 378 | End: 2022-12-28
Payer: MEDICARE

## 2022-12-28 ENCOUNTER — HOSPITAL ENCOUNTER (INPATIENT)
Age: 62
LOS: 14 days | Discharge: NURSING, REHAB OR LONG-TERM CARE | End: 2023-01-11
Attending: INTERNAL MEDICINE | Admitting: INTERNAL MEDICINE
Payer: MEDICARE

## 2022-12-28 VITALS
RESPIRATION RATE: 20 BRPM | HEIGHT: 70 IN | DIASTOLIC BLOOD PRESSURE: 65 MMHG | OXYGEN SATURATION: 97 % | TEMPERATURE: 98.8 F | BODY MASS INDEX: 28.25 KG/M2 | HEART RATE: 98 BPM | WEIGHT: 197.31 LBS | SYSTOLIC BLOOD PRESSURE: 118 MMHG

## 2022-12-28 DIAGNOSIS — K72.90 DECOMPENSATION OF CIRRHOSIS OF LIVER (HCC): Primary | ICD-10-CM

## 2022-12-28 DIAGNOSIS — N17.9 ACUTE RENAL FAILURE, UNSPECIFIED ACUTE RENAL FAILURE TYPE (HCC): ICD-10-CM

## 2022-12-28 DIAGNOSIS — E87.6 ACUTE HYPOKALEMIA: ICD-10-CM

## 2022-12-28 DIAGNOSIS — K74.60 DECOMPENSATION OF CIRRHOSIS OF LIVER (HCC): Primary | ICD-10-CM

## 2022-12-28 LAB
ANION GAP SERPL CALCULATED.3IONS-SCNC: 11 MMOL/L (ref 9–17)
BUN BLDV-MCNC: 12 MG/DL (ref 8–23)
CALCIUM SERPL-MCNC: 8 MG/DL (ref 8.6–10.4)
CHLORIDE BLD-SCNC: 102 MMOL/L (ref 98–107)
CO2: 22 MMOL/L (ref 20–31)
CREAT SERPL-MCNC: 0.54 MG/DL (ref 0.7–1.2)
GFR SERPL CREATININE-BSD FRML MDRD: >60 ML/MIN/1.73M2
GLUCOSE BLD-MCNC: 81 MG/DL (ref 70–99)
HCT VFR BLD CALC: 18.1 % (ref 41–53)
HCT VFR BLD CALC: 20.5 % (ref 41–53)
HCT VFR BLD CALC: 22.2 % (ref 41–53)
HEMOGLOBIN: 5.6 G/DL (ref 13.5–17.5)
HEMOGLOBIN: 6.2 G/DL (ref 13.5–17.5)
HEMOGLOBIN: 7 G/DL (ref 13.5–17.5)
INR BLD: 1.4
IRON SATURATION: 30 % (ref 20–55)
IRON: 69 UG/DL (ref 59–158)
PARTIAL THROMBOPLASTIN TIME: 30.3 SEC (ref 24–36)
PLATELET # BLD: 78 K/UL (ref 150–450)
POTASSIUM SERPL-SCNC: 4 MMOL/L (ref 3.7–5.3)
PROTHROMBIN TIME: 17 SEC (ref 11.8–14.6)
SODIUM BLD-SCNC: 135 MMOL/L (ref 135–144)
TOTAL IRON BINDING CAPACITY: 233 UG/DL (ref 250–450)
UNSATURATED IRON BINDING CAPACITY: 164 UG/DL (ref 112–347)

## 2022-12-28 PROCEDURE — 36415 COLL VENOUS BLD VENIPUNCTURE: CPT

## 2022-12-28 PROCEDURE — 88342 IMHCHEM/IMCYTCHM 1ST ANTB: CPT

## 2022-12-28 PROCEDURE — 6360000002 HC RX W HCPCS: Performed by: NURSE PRACTITIONER

## 2022-12-28 PROCEDURE — 85055 RETICULATED PLATELET ASSAY: CPT

## 2022-12-28 PROCEDURE — 85018 HEMOGLOBIN: CPT

## 2022-12-28 PROCEDURE — 89051 BODY FLUID CELL COUNT: CPT

## 2022-12-28 PROCEDURE — 85025 COMPLETE CBC W/AUTO DIFF WBC: CPT

## 2022-12-28 PROCEDURE — 83540 ASSAY OF IRON: CPT

## 2022-12-28 PROCEDURE — 88305 TISSUE EXAM BY PATHOLOGIST: CPT

## 2022-12-28 PROCEDURE — 99223 1ST HOSP IP/OBS HIGH 75: CPT | Performed by: INTERNAL MEDICINE

## 2022-12-28 PROCEDURE — 2709999900 IR US GUIDED PARACENTESIS

## 2022-12-28 PROCEDURE — P9016 RBC LEUKOCYTES REDUCED: HCPCS

## 2022-12-28 PROCEDURE — C9113 INJ PANTOPRAZOLE SODIUM, VIA: HCPCS | Performed by: INTERNAL MEDICINE

## 2022-12-28 PROCEDURE — 76705 ECHO EXAM OF ABDOMEN: CPT

## 2022-12-28 PROCEDURE — 86900 BLOOD TYPING SEROLOGIC ABO: CPT

## 2022-12-28 PROCEDURE — 2580000003 HC RX 258: Performed by: INTERNAL MEDICINE

## 2022-12-28 PROCEDURE — P9047 ALBUMIN (HUMAN), 25%, 50ML: HCPCS | Performed by: NURSE PRACTITIONER

## 2022-12-28 PROCEDURE — 86920 COMPATIBILITY TEST SPIN: CPT

## 2022-12-28 PROCEDURE — 83735 ASSAY OF MAGNESIUM: CPT

## 2022-12-28 PROCEDURE — 80048 BASIC METABOLIC PNL TOTAL CA: CPT

## 2022-12-28 PROCEDURE — 87075 CULTR BACTERIA EXCEPT BLOOD: CPT

## 2022-12-28 PROCEDURE — 84157 ASSAY OF PROTEIN OTHER: CPT

## 2022-12-28 PROCEDURE — 6360000002 HC RX W HCPCS: Performed by: INTERNAL MEDICINE

## 2022-12-28 PROCEDURE — 2580000003 HC RX 258: Performed by: NURSE PRACTITIONER

## 2022-12-28 PROCEDURE — 36410 VNPNXR 3YR/> PHY/QHP DX/THER: CPT

## 2022-12-28 PROCEDURE — 2709999900 IR FLUORO GUIDED NEEDLE PLACEMENT

## 2022-12-28 PROCEDURE — 36430 TRANSFUSION BLD/BLD COMPNT: CPT

## 2022-12-28 PROCEDURE — 83550 IRON BINDING TEST: CPT

## 2022-12-28 PROCEDURE — 82042 OTHER SOURCE ALBUMIN QUAN EA: CPT

## 2022-12-28 PROCEDURE — 2060000000 HC ICU INTERMEDIATE R&B

## 2022-12-28 PROCEDURE — 85049 AUTOMATED PLATELET COUNT: CPT

## 2022-12-28 PROCEDURE — 76937 US GUIDE VASCULAR ACCESS: CPT

## 2022-12-28 PROCEDURE — 86850 RBC ANTIBODY SCREEN: CPT

## 2022-12-28 PROCEDURE — 05HY33Z INSERTION OF INFUSION DEVICE INTO UPPER VEIN, PERCUTANEOUS APPROACH: ICD-10-PCS | Performed by: RADIOLOGY

## 2022-12-28 PROCEDURE — 0W9G3ZZ DRAINAGE OF PERITONEAL CAVITY, PERCUTANEOUS APPROACH: ICD-10-PCS | Performed by: RADIOLOGY

## 2022-12-28 PROCEDURE — 6370000000 HC RX 637 (ALT 250 FOR IP): Performed by: NURSE PRACTITIONER

## 2022-12-28 PROCEDURE — 6370000000 HC RX 637 (ALT 250 FOR IP): Performed by: INTERNAL MEDICINE

## 2022-12-28 PROCEDURE — 88112 CYTOPATH CELL ENHANCE TECH: CPT

## 2022-12-28 PROCEDURE — 87070 CULTURE OTHR SPECIMN AEROBIC: CPT

## 2022-12-28 PROCEDURE — 87205 SMEAR GRAM STAIN: CPT

## 2022-12-28 PROCEDURE — 85730 THROMBOPLASTIN TIME PARTIAL: CPT

## 2022-12-28 PROCEDURE — 86901 BLOOD TYPING SEROLOGIC RH(D): CPT

## 2022-12-28 PROCEDURE — 49083 ABD PARACENTESIS W/IMAGING: CPT

## 2022-12-28 PROCEDURE — 85014 HEMATOCRIT: CPT

## 2022-12-28 PROCEDURE — 85610 PROTHROMBIN TIME: CPT

## 2022-12-28 RX ORDER — ONDANSETRON 4 MG/1
4 TABLET, ORALLY DISINTEGRATING ORAL EVERY 8 HOURS PRN
Status: DISCONTINUED | OUTPATIENT
Start: 2022-12-28 | End: 2023-01-11 | Stop reason: HOSPADM

## 2022-12-28 RX ORDER — ONDANSETRON 2 MG/ML
4 INJECTION INTRAMUSCULAR; INTRAVENOUS EVERY 6 HOURS PRN
Status: DISCONTINUED | OUTPATIENT
Start: 2022-12-28 | End: 2023-01-11 | Stop reason: HOSPADM

## 2022-12-28 RX ORDER — ACETAMINOPHEN 325 MG/1
650 TABLET ORAL EVERY 6 HOURS PRN
Status: DISCONTINUED | OUTPATIENT
Start: 2022-12-28 | End: 2022-12-29

## 2022-12-28 RX ORDER — SODIUM CHLORIDE 9 MG/ML
INJECTION, SOLUTION INTRAVENOUS CONTINUOUS
Status: DISCONTINUED | OUTPATIENT
Start: 2022-12-29 | End: 2023-01-01

## 2022-12-28 RX ORDER — SODIUM CHLORIDE 0.9 % (FLUSH) 0.9 %
5-40 SYRINGE (ML) INJECTION EVERY 12 HOURS SCHEDULED
Status: DISCONTINUED | OUTPATIENT
Start: 2022-12-29 | End: 2023-01-11 | Stop reason: HOSPADM

## 2022-12-28 RX ORDER — LOPERAMIDE HYDROCHLORIDE 2 MG/1
4 CAPSULE ORAL 4 TIMES DAILY PRN
Status: DISCONTINUED | OUTPATIENT
Start: 2022-12-28 | End: 2022-12-28 | Stop reason: HOSPADM

## 2022-12-28 RX ORDER — SODIUM CHLORIDE 9 MG/ML
INJECTION, SOLUTION INTRAVENOUS PRN
Status: DISCONTINUED | OUTPATIENT
Start: 2022-12-28 | End: 2022-12-29

## 2022-12-28 RX ORDER — SODIUM CHLORIDE 0.9 % (FLUSH) 0.9 %
5-40 SYRINGE (ML) INJECTION PRN
Status: DISCONTINUED | OUTPATIENT
Start: 2022-12-28 | End: 2023-01-11 | Stop reason: HOSPADM

## 2022-12-28 RX ORDER — DIPHENHYDRAMINE HCL 25 MG
25 TABLET ORAL EVERY 8 HOURS PRN
Status: DISCONTINUED | OUTPATIENT
Start: 2022-12-28 | End: 2022-12-28 | Stop reason: HOSPADM

## 2022-12-28 RX ORDER — ACETAMINOPHEN 650 MG/1
650 SUPPOSITORY RECTAL EVERY 6 HOURS PRN
Status: DISCONTINUED | OUTPATIENT
Start: 2022-12-28 | End: 2022-12-29

## 2022-12-28 RX ORDER — ALBUMIN (HUMAN) 12.5 G/50ML
50 SOLUTION INTRAVENOUS ONCE
Status: COMPLETED | OUTPATIENT
Start: 2022-12-28 | End: 2022-12-28

## 2022-12-28 RX ORDER — MORPHINE SULFATE 4 MG/ML
4 INJECTION, SOLUTION INTRAMUSCULAR; INTRAVENOUS ONCE
Status: COMPLETED | OUTPATIENT
Start: 2022-12-29 | End: 2022-12-29

## 2022-12-28 RX ADMIN — ALBUMIN (HUMAN) 50 G: 0.25 INJECTION, SOLUTION INTRAVENOUS at 15:34

## 2022-12-28 RX ADMIN — ONDANSETRON 4 MG: 2 INJECTION INTRAMUSCULAR; INTRAVENOUS at 17:23

## 2022-12-28 RX ADMIN — ATORVASTATIN CALCIUM 20 MG: 20 TABLET, FILM COATED ORAL at 21:37

## 2022-12-28 RX ADMIN — DIPHENHYDRAMINE HYDROCHLORIDE 25 MG: 25 TABLET ORAL at 11:46

## 2022-12-28 RX ADMIN — LOPERAMIDE HYDROCHLORIDE 4 MG: 2 CAPSULE ORAL at 11:46

## 2022-12-28 RX ADMIN — SODIUM CHLORIDE 80 MG: 9 INJECTION, SOLUTION INTRAVENOUS at 17:28

## 2022-12-28 RX ADMIN — ONDANSETRON 4 MG: 2 INJECTION INTRAMUSCULAR; INTRAVENOUS at 04:45

## 2022-12-28 RX ADMIN — FUROSEMIDE 20 MG: 20 TABLET ORAL at 10:12

## 2022-12-28 RX ADMIN — ONDANSETRON 4 MG: 4 TABLET, ORALLY DISINTEGRATING ORAL at 11:46

## 2022-12-28 RX ADMIN — SODIUM CHLORIDE, PRESERVATIVE FREE 10 ML: 5 INJECTION INTRAVENOUS at 10:11

## 2022-12-28 RX ADMIN — DIPHENHYDRAMINE HYDROCHLORIDE 25 MG: 25 TABLET ORAL at 21:37

## 2022-12-28 RX ADMIN — SODIUM CHLORIDE 8 MG/HR: 9 INJECTION, SOLUTION INTRAVENOUS at 18:24

## 2022-12-28 RX ADMIN — FUROSEMIDE 20 MG: 20 TABLET ORAL at 17:21

## 2022-12-28 RX ADMIN — FERROUS SULFATE TAB 325 MG (65 MG ELEMENTAL FE) 325 MG: 325 (65 FE) TAB at 17:21

## 2022-12-28 RX ADMIN — OCTREOTIDE ACETATE 25 MCG/HR: 500 INJECTION, SOLUTION INTRAVENOUS; SUBCUTANEOUS at 17:36

## 2022-12-28 RX ADMIN — SPIRONOLACTONE 100 MG: 25 TABLET ORAL at 17:21

## 2022-12-28 RX ADMIN — FLUOXETINE 20 MG: 20 CAPSULE ORAL at 10:12

## 2022-12-28 RX ADMIN — ATORVASTATIN CALCIUM 20 MG: 20 TABLET, FILM COATED ORAL at 00:39

## 2022-12-28 RX ADMIN — FERROUS SULFATE TAB 325 MG (65 MG ELEMENTAL FE) 325 MG: 325 (65 FE) TAB at 10:12

## 2022-12-28 RX ADMIN — PANTOPRAZOLE SODIUM 40 MG: 40 TABLET, DELAYED RELEASE ORAL at 10:12

## 2022-12-28 ASSESSMENT — PAIN DESCRIPTION - LOCATION: LOCATION: ABDOMEN

## 2022-12-28 ASSESSMENT — PAIN SCALES - GENERAL
PAINLEVEL_OUTOF10: 7
PAINLEVEL_OUTOF10: 7

## 2022-12-28 NOTE — PROGRESS NOTES
RN called [facility] to initiate patient transfer to [address] [place] [address]. [facility] Access to connect with Dr. Vianca Felton and call when bed placement available. Electronically signed by Gladys Jerez RN.

## 2022-12-28 NOTE — CONSULTS
GI ATTENDING  Gastroenterology  Consultation Note     . REASON FOR CONSULTATION: Evaluation for GI bleeding, melena      CC : \"Weakness and fatigue, black stool\"    HISTORY OF PRESENT ILLNESS:         76-year-old male with a past medical history of alcoholism and alcoholic related cirrhosis(-PSE, +EVB, +PHG, -SBP), decompensated with multiple prior admissions for esophageal variceal hemorrhage and portal hypertensive gastropathy related bleeding, abdominal ascites requiring routine LVP's, prior history of Lezama's esophagus and esophageal cancer treated with a chemoradiation therapy 1.5 years ago, malignant colon polyp per patient's own account, recent alcohol intake approximately 4 weeks ago (24 ounces of beer), underwent recent TIPS procedure December 1, 2022, presenting now with worsening abdominal ascites, melena and progressive weakness. Patient denies any abdominal discomfort or hematemesis. No last episode of gross melena greater than 4 hours ago. Patient's progressive weakness that prompted a visit to the [facility]. On arrival to [facility] patient was identified to be well oriented, appeared to be weak. Normotensive, afebrile. Initial labs revealed:  White count of 3.6, hemoglobin of 5.0, platelet count of 74 declined from previous value of 134 on 13 th December. Reviewed value of 6.2 after 1 unit PRBC transfusion, followed by 5.6 g/dL. Overnight the patient did report episodes of melena, approximately 3-4 episodes. Sodium of 129, BUN of 10, creatinine 0.53. Repeat BUN of 12    CT abdomen pelvis was performed which revealed stable circumferential wall thickening of the distal esophagus and GE junction consistent with known esophageal malignancy (not seen endoscopically in October 2022), nodular contour of the liver with enlargement of the quadrate lobe and left hepatic lobes consistent with hepatic cirrhosis.   TIPS stent appears to be in place and patent per CT, no evidence of thrombus identified. There is evidence of a splenomegaly with portal venous hypertension, perigastric varices and esophageal varices. Upper abdominal varices identified on CT abdomen pelvis. Additionally, there is of rim calcified lesion measuring approximately 30 1X 24 mm adjacent to the pancreas likely representing a thrombosed splenic artery aneurysm, unchanged from previous imaging. Patient was admitted to medicine for upper GI hemorrhage. Orders placed for octreotide and Protonix.       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 Shara Lagunas, orthopedic, every 3-4 months, gets steroid injection    Lezama esophagus     BPH (benign prostatic hypertrophy)     Cholelithiasis     Cirrhosis ([address]     COVID-19 12/2020    pt reports he had a positive test while at [facility] in 2020, was asymptomatic    COVID-19 vaccine series completed 5/20/2021, 6/22/2021    Moderna 5/20/2021, 6/22/2021    DDD (degenerative disc disease), lumbar     Depression     Esophageal cancer ([address]     INVASIVE ADENOCARCINOMA ARISING IN TUBULAR ADENOMA WITH HIGH GRADE DYSPLASIA, ASSOCIATED WITH FOCAL INTESTINAL METAPLASIA     Esophageal varices ([address]     Fatty liver     GERD (gastroesophageal reflux disease)     GI bleed     Hep C w/o coma, chronic ([address]     History of alcohol abuse     6-12 beers a day; quit drinking 2019    History of blood transfusion     History of colon polyps 2016    History of tobacco abuse     [place] (hard of hearing)     Hyperlipidemia     Hypertension     Hyponatremia 07/20/2016    Hypotension 12/20/2021    PONV (postoperative nausea and vomiting)     Port-A-Cath in place     right upper chest    Portal hypertension ([address]     Sciatica     Secondary esophageal varices ([address] 06/07/2022    Shortness of breath     Spinal stenosis     Stomach ulcer     hx of    Thrombocytopenia ([facility] 12/23/2020    Tubular adenoma of colon 2016, 2018    Vitamin D deficiency     Wears glasses      Past Surgical History:   Procedure Laterality Date    BUNIONECTOMY      twice on right side    BUNIONECTOMY Left     CARPAL TUNNEL RELEASE Right     COLONOSCOPY      at age 35    COLONOSCOPY  10/05/2016    polyps-pathology tubular adenoma, and abnormal looking mucosa right colon-pathology-tubular adenoma    COLONOSCOPY N/A 03/30/2018    COLONOSCOPY POLYPECTOMY COLD BIOPSY performed by Santa Mckeon MD at [address]  03/30/2018    Small polyp in the sigmoid colon and excised with biopsy forceps--tubular adenoma    COLONOSCOPY N/A 04/16/2022    COLONOSCOPY POLYPECTOMY performed by Santa Mckeon MD at [facility], DIAGNOSTIC      EGD    IR PORT PLACEMENT EQUAL OR GREATER THAN 5 YEARS  04/19/2021    IR PORT PLACEMENT EQUAL OR GREATER THAN 5 YEARS 4/19/2021 [address] [place] [address]    IR TIPS INSERTION  12/01/2022    IR TIPS INSERTION 12/1/2022 Rach Cespedes MD [facility]    KNEE SURGERY Left     cyst removed    NASAL SEPTUM SURGERY      NERVE BLOCK Right 11/23/2020    NERVE BLOCK RIGHT CERVICAL STEROID INJECTION  C3-C6 performed by Kamron Madsen MD at [address]  [facility]  01/04/2016    steroid injection C7 T1    OTHER SURGICAL HISTORY  11/21/2016    Bilateral Lumbar CACHORRO L4-L5 injections    OTHER SURGICAL HISTORY  12/19/2016    lumbar steroid injection    OTHER SURGICAL HISTORY  09/28/2018    BILATERAL L5 CACHORRO (N/A Back)    OTHER SURGICAL HISTORY Right 11/23/2020    cervical injection    PAIN MANAGEMENT PROCEDURE Left 07/09/2020    EPIDURAL STEROID INJECTION LEFT L4 L5 performed by Kamron Madsen MD at [facility] [address] Left 07/20/2020    LEFT L4 L5 EPIDURAL STEROID INJECTION performed by Kamron Madsen MD at [address] Bilateral 08/17/2020 LUMBAR FACET BILATERAL L2-L5 performed by Travis Moreno MD at [address] [facility] Bilateral 12/07/2020    NERVE BLOCK BILATERAL LUMBAR MEDIAL BRANCH L2-L5 performed by Travis Moreno MD at [address] [facility] [address] AA&/STRD TFRML EPI LUMBAR/SACRAL 1 LEVEL Bilateral 09/06/2018    BILATERAL L5 CACHORRO performed by Travis Moreno MD at [facility] AA&/STRD TFRML EPI LUMBAR/SACRAL 1 LEVEL N/A 09/28/2018    BILATERAL L5 CACHORRO performed by Travis Moreno MD at [address] [place] [address] N/CARPAL TUNNEL SURG Right 08/29/2017    CARPAL TUNNEL RELEASE RIGHT performed by Irwin Kessler MD at [address] [place] [address] N/CARPAL TUNNEL SURG Left 10/31/2017    CARPAL TUNNEL RELEASE performed by Irwin Kessler MD at [address] [place] [address] 12/29/2020    EGD BIOPSY performed by Bon Negron MD at [address] [place] [address] 02/02/2021    EGD BIOPSY and spot marking performed by Judith De La Torre MD at [address] [place] [address] N/A 02/12/2021    ENDOSCOPIC ULTRASOUND, EGD performed by Zaynab Lloyd MD at [facility] 1  02/12/2021    EGD DIAGNOSTIC ONLY performed by Zaynab Lloyd MD at [facility] 1 08/31/2021    EGD BIOPSY performed by Judith De La Torre MD at [address] [place] [address] 01/21/2022    EGD BIOPSY performed by Judith De La Torre MD at [address] [place] [address] 04/15/2022    EGD ESOPHAGOGASTRODUODENOSCOPY performed by Bon Negron MD at [address] [place] [address] 06/06/2022    EGD BAND LIGATION performed by Maulik Dietrich MD at [address] [place] [address] N/A 06/09/2022    EGD ESOPHAGOGASTRODUODENOSCOPY performed by Maulik Dietrich MD at [address] [place] [address] N/A 09/14/2022    EGD ESOPHAGOGASTRODUODENOSCOPY ENDOSCOPIC APC AT GE JUNCTION performed by Osmin Pereyra MD at [address] [facility] 10/19/2022    EGD BIOPSY performed by Hemant Capellan MD at [address] [place] [address] 10/31/2022    EGD with APC performed by Hemant Capellan MD at [address] [place] [address]:  No Known Allergies    HOME MEDICATIONS:  Prior to Admission medications    Medication Sig Start Date End Date Taking? Authorizing Provider   diphenhydrAMINE (BENADRYL) 25 MG tablet Take 1 tablet by mouth every 6 hours as needed for Itching  Patient not taking: No sig reported 12/2/22 1/1/23  Chris COLE Blood, DO   pantoprazole (PROTONIX) 40 MG tablet Take 40 mg by mouth daily 10/4/22   Historical Provider, MD   FEROSUL 325 (65 Fe) MG tablet take 1 tablet by mouth twice a day 10/7/22   VASU Esteves   nadolol (CORGARD) 20 MG tablet take 1 tablet by mouth once daily 8/26/22   MASSIMO Hughes NP   midodrine (PROAMATINE) 5 MG tablet Take 2 tablets by mouth in the morning and 2 tablets at noon and 2 tablets before bedtime.   Patient not taking: Reported on 12/27/2022 8/4/22   MASSIMO Hughes NP   furosemide (LASIX) 20 MG tablet Take 1 tablet by mouth 2 times daily 6/23/22   MASSIMO Hughes NP   spironolactone (ALDACTONE) 100 MG tablet Take 1 tablet by mouth every evening 6/23/22   MASSIMO Hughes NP   ondansetron (ZOFRAN-ODT) 4 MG disintegrating tablet dissolve 1 tablet by mouth every 8 hours if needed for nausea and vomiting  Patient not taking: No sig reported 5/31/22   VASU Esteves   lactulose [facility]) 10 GM/15ML solution take 30 milliliters by mouth three times a day 5/31/22   VASU Esteves   FLUoxetine (PROZAC) 20 MG capsule Take 1 capsule by mouth daily 4/21/22   Mary Kay Suero MD   atorvastatin (LIPITOR) 20 MG tablet Take 1 tablet by mouth nightly 4/21/22   Mary Kay Suero MD     .  CURRENT MEDICATIONS:  Scheduled Meds:   pantoprazole (PROTONIX) bolus  80 mg IntraVENous Once    albumin human  50 g IntraVENous Once    atorvastatin  20 mg Oral Nightly    ferrous sulfate  325 mg Oral BID WC    furosemide  20 mg Oral BID    lactulose  20 g Oral BID    nadolol  20 mg Oral Daily    spironolactone  100 mg Oral QPM    sodium chloride flush  5-40 mL IntraVENous 2 times per day     . Continuous Infusions:   pantoprazole      octreotide (SandoSTATIN) infusion      sodium chloride       . PRN Meds:diphenhydrAMINE, loperamide, sodium chloride flush, fentanNYL, sodium chloride flush, sodium chloride, ondansetron **OR** ondansetron  .   SOCIAL HISTORY:     Social History     Socioeconomic History    Marital status: Single     Spouse name: Not on file    Number of children: Not on file    Years of education: Not on file    Highest education level: Not on file   Occupational History    Not on file   Tobacco Use    Smoking status: Former     Packs/day: 1.00     Years: 45.00     Pack years: 45.00     Types: Cigarettes     Quit date: 2017     Years since quittin.9    Smokeless tobacco: Never   Vaping Use    Vaping Use: Never used   Substance and Sexual Activity    Alcohol use: Not Currently     Comment: Quit alcohol in 2019- heavier drinking prior to quitting    Drug use: Not Currently     Frequency: 1.0 times per week     Types: Cocaine     Comment: Cocaine- stopped spring 2016    Sexual activity: Yes     Partners: Female   Other Topics Concern    Not on file   Social History Narrative     in the past, retired     Social Determinants of Health     Financial Resource Strain: Low Risk     Difficulty of Paying Living Expenses: Not hard at all   Food Insecurity: No Food Insecurity    Worried About [address] in the Last Year: Never true    [facility] in the Last Year: Never true   Transportation Needs: Not on file   Physical Activity: Inactive    Days of Exercise per Week: 0 days    Minutes of Exercise per Session: 0 min   Stress: Not on file   Social Connections: Not on file   Intimate Partner Violence: Not on file   Housing Stability: Not on file       FAMILY HISTORY:   Family History   Problem Relation Age of Onset    Cancer Mother         pancreatic    Cancer Father         bone    Diabetes Sister     Asthma Brother     Allergies Brother         MULTIPLE       REVIEW OF SYSTEMS:     Constitutional: No fever, no chills, no lethargy, no weakness. HEENT:  No headache, otalgia, itchy eyes, nasal discharge or sore throat. Cardiac:  No chest pain, dyspnea, orthopnea or PND. Chest:   No cough, phlegm or wheezing. Abdomen:  No abdominal pain, nausea or vomiting. Neuro:  No focal weakness, abnormal movements or seizure like activity. Skin:   No rashes, no itching. :   No hematuria, no pyuria, no dysuria, no flank pain. Extremities:  No swelling or joint pains. ROS was otherwise negative except as mentioned in the [address] PHYSICAL EXAM:    /69   Pulse (!) 107   Temp 98 °F (36.7 °C) (Oral)   Resp 22   Ht 5' 10\" (1.778 m)   Wt 197 lb 5 oz (89.5 kg)   SpO2 97%   BMI 28.31 kg/m²   . TMAX[24]    General: Well developed, Well nourished, No apparent distress  Head:  Normocephalic, Atraumatic, nasal cannula in place at 4 L  EENT: EOMI, Sclera not icteric, Oropharynx moist  Neck:  Supple, Trachea midline  Lungs:CTA Bilaterally  Heart: RRR, No murmur, No rub, No gallop, PMI nondisplaced. Abdomen:Soft, Non tender, moderate distention with ascites, BS WNL,  No masses. Ext:No clubbing. No cyanosis. No edema. Skin: No rashes. No jaundice. No stigmata of liver disease.     Neuro:  A&O x Three, No focal neurological deficits    LABS and IMAGING:     Hemotological labs:   ANEMIA STUDIES  Recent Labs     12/28/22  0601   LABIRON 30   TIBC 233*       CBC  Recent Labs     12/27/22  1734 12/28/22  0601 12/28/22  0910   WBC 3.6  --   --    HGB 5.0* 6.2* 5.6*   MCV 78.3*  --   --    RDW 18.4*  -- --    PLT 74*  --   --        PT/INR  Recent Labs     12/28/22  0601   PROTIME 17.0*   INR 1.4       BMP  Recent Labs     12/27/22  1734 12/28/22  0601   * 135   K 3.8 4.0   CL 98 102   CO2 21 22   BUN 10 12   CREATININE 0.53* 0.54*   GLUCOSE 102* 81   CALCIUM 7.5* 8.0*       LIVER WORK UP  Hepatitis Functional Panel:  Recent Labs     12/27/22  1734   ALKPHOS 209*   ALT 16   AST 45*   PROT 4.5*   BILITOT 2.3*   LABALBU 2.3*       AMYLASE/LIPASE/AMMONIA  Recent Labs     12/27/22  1734   LIPASE 18   AMMONIA 41       Acute Hepatitis Panel   Lab Results   Component Value Date/Time    HEPBSAG NONREACTIVE 12/23/2020 05:09 PM    HEPCAB REACTIVE 12/23/2020 05:09 PM    HEPBIGM NONREACTIVE 12/23/2020 05:09 PM    HEPAIGM NONREACTIVE 08/19/2021 01:29 PM       HCV Genotype   Lab Results   Component Value Date/Time    HEPATITISCGENOTYPE Mixed 09/06/2016 02:07 PM       HCV Quantitative   Lab Results   Component Value Date/Time    HCVQNT NOT REPORTED 09/06/2016 26:93 PM       Metabolic work up:  Ceruloplasmin  AA work up:  Alpha 1 antitrypsin   Lab Results   Component Value Date/Time    A1A 152 08/19/2021 01:29 PM     AMA:  Lab Results   Component Value Date/Time    MITOAB 2.6 07/21/2016 09:29 PM       ASMA:  No results found for: SMOOTHMUSCAB    ANCA:    MARYSOL:  Lab Results   Component Value Date/Time    MARYSOL NEGATIVE 07/21/2016 09:29 PM       Anti - Liver/Kidney Ab:  No results found for: LIVER-KIDNEYMICROSOMALAB    Ceruloplasmin:  Lab Results   Component Value Date/Time    CERULOPLSM 27 07/21/2016 09:29 PM       Celiac panel:  No results found for: TISSTRNTIIGG, TTGIGA, IGA    Cancer Markers:  CEA:    No results for input(s): CEA in the last 72 hours. Ca 125:   No results for input(s):  in the last 72 hours. Ca 19-9:     Invalid input(s):   AFP: No results for input(s): AFP in the last 72 hours.      ASSESSMENT and PLAN:     Briefly, 54-year-old male with past medical history of decompensated alcoholic cirrhosis portal hypertension with portal-tensive gastropathy, history of esophageal cancer, history of vascular ectasia identified on previous upper endoscopy status post APC treatment, recent TIPS procedure for recurrent abdominal ascites and portal hypertension, appears to have worsening abdominal ascites, thrombocytopenia and portal hypertension based on radiographic imaging, presenting to the hospital for symptomatic anemia and melena concerning for upper GI hemorrhage. CT abdomen pelvis reviewed with radiology which reveals worsening abdominal ascites, TIPS (~8mm currently) appears to be patent. Recommendation was to obtain liver ultrasound with Doppler to evaluate pre-- post stent pressures. No signs of hepatic encephalopathy, ammonia level of 41. Recommendation:  - Continue to trend H&H, patient will need hemoglobin greater than 7 g/dL, need posttransfusion CBC, patient is to be started on octreotide, Protonix drip. Keep n.p.o.  - Spoke to interventional radiology and patient may need TIPS widening with complex of worsening portal hypertension. Prominence of gastric fundus which may reflect gastric varices, previous EGD had no mention. Moderate amount of portal hypertensive gastropathy with arterial ectasia at the site of hiatal hernia. Distal esophageal thickening identified on CT scanning with periesophageal varices. Patient to be transferred to [place] for IR capacity and to consider for possible TIPS widening +/- BRTO if gastric varices identified on pending EGD. Made IR, Dr. Sloane Ibarra, aware of patient in case services are required. -Recommend patient be optimized prior to endoscopic procedure with removal of abdominal ascites with LVP and placement of IR guided venous access. Recommend 50 g of albumin IV be given during LVP. We are available  in case urgent/emergent upper endoscopy is needed.   Follow-up closely.  -We will need to repeat echocardiogram in case TIPS stent needs widening to ensure shunt load is tolerated    Thank you for allowing us to take part in the patients care  Contact GI for any remaining questions, we are available. Electronically signed by:  Lady Ren MD   Gastroenterology  12/28/2022    3:24 PM       This note is created with the assistance of a speech recognition program.  While intending to generate a document that actually reflects the content of the visit, the document can still have some errors including those of syntax and sound a like substitutions which may escape proof reading. It such instances, actual meaning can be extrapolated by contextual diversion.

## 2022-12-28 NOTE — PROGRESS NOTES
Patient tolerated paracentesis without distress. 4800 ml of cloudy, yellow fluid removed. Dry dressing to site. Patient returned to room. Nurse updated.

## 2022-12-28 NOTE — ED NOTES
Report given to Tony Lopez RN from ER. Report method in person   The following was reviewed with receiving RN:   Current vital signs:  /61   Pulse (!) 111   Temp 98.1 °F (36.7 °C)   Resp 20   Ht 5' 10\" (1.778 m)   Wt 195 lb (88.5 kg)   SpO2 94%   BMI 27.98 kg/m²                MEWS Score: 3     Any medication or safety alerts were reviewed. Any pending diagnostics and notifications were also reviewed, as well as any safety concerns or issues, abnormal labs, abnormal imaging, and abnormal assessment findings. Questions were answered.             Chaitanya Mckinley  12/27/22 1921

## 2022-12-28 NOTE — PLAN OF CARE
Case discussed with primary rn alessio and dr prasad Donovan, stat liver us ordered to check patency of tips, stat plt level, keep npo and on bedrest, continue ppi and octreotide drip. Dr prasad Donovan will see the pt today and decide on possible egd. Juan Alberto Argueta, APRN - CNP

## 2022-12-28 NOTE — ED PROVIDER NOTES
[address]     Pt Name: Baljeet Kumar  MRN: [id number]  [place] 1960  Date of evaluation: 12/27/22       Baljeet Kumar is a 60 y.o. male who presents with Abdominal Pain      MDM:   Recent TIPS  N/v/abd pain  Chronic liver disease  Usually gets taps once a week  Labs, CT to eval TIPs shunt    Found to be anemic, plan to transfuse and admit    Vitals:   Vitals:    12/27/22 1821 12/27/22 1825 12/27/22 1845 12/27/22 1900   BP:   120/65 113/61   Pulse: (!) 110 (!) 112 (!) 108 (!) 111   Resp: 16 18 22 20   Temp:       SpO2: 94% 95% 96% 94%   Weight:       Height:           PHYSICAL:   Temp: 98.1 °F (36.7 °C),  Heart Rate: (!) 111, Resp: 20, BP: 113/61, SpO2: 94 %  Gen: Non-toxic, Afebrile  Neck: Supple  Cards: Regular rate and rhythm  Pulm: Lung sounds clear to auscultation  Abdomen: Soft, non-tender, distended abdomen present  Skin: warm, dry  Extremities: pulses 2+ radial / dorsalis pedis, no clubbing, cyanosis, edema  Neurologic: CAOX3, no facial asymmetry, no dysarthria or aphasia     I personally saw and examined the patient. I have reviewed and agree with the resident's findings, including all diagnostic interpretations and treatment plan as written. I was present for the key portions of any procedures performed and the inclusive time noted for any critical care statement. There was a high probability of clinically significant/life threatening deterioration in this patient's condition which required my urgent intervention. Total critical care time was 20 minutes. This excludes any time for separately reportable procedures. ED Course as of 12/27/22 1920   Tue Dec 27, 2022   165 56-year-old male, recent TIPS procedure coming in with worsening abdominal pain, nausea. Patient has chronic liver disease from alcohol use. Patient states he gets his abdomen tapped every Friday, he is also had a pleural effusion which was also tapped recently.   Currently hemodynamically stable, tachycardic however not hypoxic, afebrile. Physical exam unremarkable for abdominal tenderness. No point tenderness, no right upper or lower quadrant tenderness. Will order labs, CT to evaluate for shunt placement. Zofran for nausea, fentanyl for pain. [KK]   1745 Hemoglobin of 5.0, will transfuse 2 units. Anticipate admission.  [address]      ED Course User Index  301 DO Len Carlos DO  Attending Emergency Physician         Len Gibbons DO  12/27/22 1694

## 2022-12-28 NOTE — PROGRESS NOTES
[address] Internal Medicine  Beba Colon MD; Haley Desir MD; Modesta Arriaga MD; MD Kelvin Andrews MD; Daryll Gilford, MD  [facility]   [address]                 Date:   12/28/2022  Patientname:  Casandra Cyr  Date of admission:  12/27/2022  4:56 PM  MRN:   [id number]  Account:  [de-identified]  YOB: 1960  PCP:    VASU Santiago  Room:   2101/2101-01  Code Status:    Full Code      Chief Complaint:     Chief Complaint   Patient presents with    Abdominal Pain       History of Present Illness:     Casandra Cyr is a 60 y.o. Non- / non  male who presents with Abdominal Pain and is admitted to the hospital for the management of Symptomatic anemia. Medical history includes alcoholic cirrhosis of liver with ascites, barrettes esophagus, pH, esophageal cancer completed treatment without reoccurrence, nicotine dependence, GI bleed requiring blood transfusion and HLD. According to patient, he recently underwent TIPS procedure for history of alcoholic cirrhosis with ascites. He is scheduled for paracentesis every Friday. Last paracentesis on 12/23 with 4800 mL fluid removal.  He also had a thoracentesis performed on 12/9 with 102 5 mL fluid removal from left lung he reports that abdominal pain is generalized. Has regular bowel movement and denies dark or tarry stool. Endorses nausea without emesis. Tachycardia present otherwise hemodynamically stable. Labs reviewed all hemoglobin of 5.0.  2 units PRBC ordered in ED. Last EGD was October 2022. Last colonoscopy April 2022. Denies fever, chills, chest pain, cough, vomiting, diarrhea, and urinary symptoms. There are no aggravating or alleviating factors. Symptoms are reported as acute, constant and moderate in severity.     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 Ana Webb, orthopedic, every 3-4 months, gets steroid injection    Lezama esophagus     BPH (benign prostatic hypertrophy)     Cholelithiasis     Cirrhosis ([address]     COVID-19 12/2020    pt reports he had a positive test while at [facility] in 2020, was asymptomatic    COVID-19 vaccine series completed 5/20/2021, 6/22/2021    Moderna 5/20/2021, 6/22/2021    DDD (degenerative disc disease), lumbar     Depression     Esophageal cancer ([address]     INVASIVE ADENOCARCINOMA ARISING IN TUBULAR ADENOMA WITH HIGH GRADE DYSPLASIA, ASSOCIATED WITH FOCAL INTESTINAL METAPLASIA     Esophageal varices ([address]     Fatty liver     GERD (gastroesophageal reflux disease)     GI bleed     Hep C w/o coma, chronic ([address]     History of alcohol abuse     6-12 beers a day; quit drinking 2019    History of blood transfusion     History of colon polyps 2016    History of tobacco abuse     [place] (hard of hearing)     Hyperlipidemia     Hypertension     Hyponatremia 07/20/2016    Hypotension 12/20/2021    PONV (postoperative nausea and vomiting)     Port-A-Cath in place     right upper chest    Portal hypertension ([address]     Sciatica     Secondary esophageal varices ([address] 06/07/2022    Shortness of breath     Spinal stenosis     Stomach ulcer     hx of    Thrombocytopenia ([address] 12/23/2020    Tubular adenoma of colon 2016, 2018    Vitamin D deficiency     Wears glasses         Past Surgical History:     Past Surgical History:   Procedure Laterality Date    BUNIONECTOMY      twice on right side    BUNIONECTOMY Left     CARPAL TUNNEL RELEASE Right     COLONOSCOPY      at age 35    COLONOSCOPY  10/05/2016    polyps-pathology tubular adenoma, and abnormal looking mucosa right colon-pathology-tubular adenoma    COLONOSCOPY N/A 03/30/2018    COLONOSCOPY POLYPECTOMY COLD BIOPSY performed by Be Hansen MD at [address] [place] [address]  03/30/2018    Small polyp in the sigmoid colon and excised with biopsy forceps--tubular adenoma    COLONOSCOPY N/A 04/16/2022    COLONOSCOPY POLYPECTOMY performed by Melani Carter MD at [address] [facility], COLON, DIAGNOSTIC      EGD    IR PORT PLACEMENT EQUAL OR GREATER THAN 5 YEARS  04/19/2021    IR PORT PLACEMENT EQUAL OR GREATER THAN 5 YEARS 4/19/2021 [facility]    IR TIPS INSERTION  12/01/2022    IR TIPS INSERTION 12/1/2022 Andre Vergara MD [facility]    KNEE SURGERY Left     cyst removed    NASAL SEPTUM SURGERY      NERVE BLOCK Right 11/23/2020    NERVE BLOCK RIGHT CERVICAL STEROID INJECTION  C3-C6 performed by Ashley Merritt MD at [address]  01/04/2016    steroid injection C7 T1    OTHER SURGICAL HISTORY  11/21/2016    Bilateral Lumbar CACHORRO L4-L5 injections    OTHER SURGICAL HISTORY  12/19/2016    lumbar steroid injection    OTHER SURGICAL HISTORY  09/28/2018    BILATERAL L5 CACHORRO (N/A Back)    OTHER SURGICAL HISTORY Right 11/23/2020    cervical injection    PAIN MANAGEMENT PROCEDURE Left 07/09/2020    EPIDURAL STEROID INJECTION LEFT L4 L5 performed by Ashley Merritt MD at [address] Left 07/20/2020    LEFT L4 L5 EPIDURAL STEROID INJECTION performed by Ashley Merritt MD at [address] Bilateral 08/17/2020    LUMBAR FACET BILATERAL L2-L5 performed by Ashley Merritt MD at [address] Bilateral 12/07/2020    NERVE BLOCK BILATERAL LUMBAR MEDIAL BRANCH L2-L5 performed by Ashley Merritt MD at [address] [facility] AA&/STRD TFRML EPI LUMBAR/SACRAL 1 LEVEL Bilateral 09/06/2018    BILATERAL L5 CACHORRO performed by Ashley Merritt MD at [facility] AA&/STRD TFRML EPI LUMBAR/SACRAL 1 LEVEL N/A 09/28/2018    BILATERAL L5 CACHORRO performed by Ashley Merritt MD at [address] [facility] [address] Right 08/29/2017    CARPAL TUNNEL RELEASE RIGHT performed by Lennox Cheung Des Delgado MD at [facility]/CARPAL TUNNEL SURG Left 10/31/2017    CARPAL TUNNEL RELEASE performed by Terry Grimes MD at [address] [place] [address] 12/29/2020    EGD BIOPSY performed by Irwin Lorenzo MD at [address] [place] [address] 02/02/2021    EGD BIOPSY and spot marking performed by Ford Cancino MD at [address] [place] [address] 02/12/2021    ENDOSCOPIC ULTRASOUND, EGD performed by Cristofer Pike MD at [place] [address]  02/12/2021    EGD DIAGNOSTIC ONLY performed by Cristofer Pike MD at [place] [address] N/A 08/31/2021    EGD BIOPSY performed by Ford Cancino MD at [address] [place] [address] 01/21/2022    EGD BIOPSY performed by Ford Cancino MD at [address] [place] [address] N/A 04/15/2022    EGD ESOPHAGOGASTRODUODENOSCOPY performed by Irwin Lorenzo MD at [address] [place] [address] 06/06/2022    EGD BAND LIGATION performed by Devon Petty MD at [address] [place] [address] N/A 06/09/2022    EGD ESOPHAGOGASTRODUODENOSCOPY performed by Devon Petty MD at [address] [place] [address] N/A 09/14/2022    EGD ESOPHAGOGASTRODUODENOSCOPY ENDOSCOPIC APC AT GE JUNCTION performed by Shelia Kay MD at [address] [place] [address] 10/19/2022    EGD BIOPSY performed by Ford Cancino MD at [address] [place] [address] 10/31/2022    EGD with APC performed by Ford Cancino MD at [address] [place] [address]          Medications Prior to Admission:     Prior to Admission medications    Medication Sig Start Date End Date Taking?  Authorizing Provider   diphenhydrAMINE (BENADRYL) 25 MG tablet Take 1 tablet by mouth every 6 hours as needed for Itching  Patient not taking: No sig reported 12/2/22 1/1/23  Sigifredo COLE Blood, DO   pantoprazole (PROTONIX) 40 MG tablet Take 40 mg by mouth daily 10/4/22   Historical Provider, MD   FEROSUL 325 (65 Fe) MG tablet take 1 tablet by mouth twice a day 10/7/22   VASU Esteves   nadolol (CORGARD) 20 MG tablet take 1 tablet by mouth once daily 8/26/22   MASSIMO Hughes NP   midodrine (PROAMATINE) 5 MG tablet Take 2 tablets by mouth in the morning and 2 tablets at noon and 2 tablets before bedtime. Patient not taking: Reported on 12/27/2022 8/4/22   MASSIMO Hughes - NP   furosemide (LASIX) 20 MG tablet Take 1 tablet by mouth 2 times daily 6/23/22   MASSIMO Hughes NP   spironolactone (ALDACTONE) 100 MG tablet Take 1 tablet by mouth every evening 6/23/22   MASSIMO Hughes NP   ondansetron (ZOFRAN-ODT) 4 MG disintegrating tablet dissolve 1 tablet by mouth every 8 hours if needed for nausea and vomiting  Patient not taking: No sig reported 5/31/22   VASU Esteves   lactulose [facility]) 10 GM/15ML solution take 30 milliliters by mouth three times a day 5/31/22   VASU Esteves   FLUoxetine (PROZAC) 20 MG capsule Take 1 capsule by mouth daily 4/21/22   Mary Kay Suero MD   atorvastatin (LIPITOR) 20 MG tablet Take 1 tablet by mouth nightly 4/21/22   Mary Kay Suero MD        Allergies:     Patient has no known allergies. Social History:     Tobacco:    reports that he quit smoking about 5 years ago. His smoking use included cigarettes. He has a 45.00 pack-year smoking history. He has never used smokeless tobacco.  Alcohol:      reports that he does not currently use alcohol. Drug Use:  reports that he does not currently use drugs after having used the following drugs: Cocaine. Frequency: 1.00 time per week.     Family History:     Family History   Problem Relation Age of Onset    Cancer Mother         pancreatic    Cancer Father         bone    Diabetes Sister     Asthma Brother     Allergies Brother         MULTIPLE Investigations:      Laboratory Testing:  Recent Results (from the past 24 hour(s))   CBC with Auto Differential    Collection Time: 12/27/22  5:34 PM   Result Value Ref Range    WBC 3.6 3.5 - 11.0 k/uL    RBC 2.12 (L) 4.5 - 5.9 m/uL    Hemoglobin 5.0 (LL) 13.5 - 17.5 g/dL    Hematocrit 16.6 (L) 41 - 53 %    MCV 78.3 (L) 80 - 100 fL    MCH 23.4 (L) 26 - 34 pg    MCHC 29.9 (L) 31 - 37 g/dL    RDW 18.4 (H) 11.5 - 14.9 %    Platelets 74 (L) 551 - 450 k/uL    MPV 7.2 6.0 - 12.0 fL    Seg Neutrophils 88 (H) 36 - 66 %    Lymphocytes 6 (L) 24 - 44 %    Monocytes 4 1 - 7 %    Eosinophils % 2 0 - 4 %    Basophils 0 0 - 2 %    Segs Absolute 3.17 1.3 - 9.1 k/uL    Absolute Lymph # 0.22 (L) 1.0 - 4.8 k/uL    Absolute Mono # 0.14 0.1 - 1.3 k/uL    Absolute Eos # 0.07 0.0 - 0.4 k/uL    Basophils Absolute 0.00 0.0 - 0.2 k/uL    Morphology ANISOCYTOSIS PRESENT     Morphology HYPOCHROMIA PRESENT    Comprehensive Metabolic Panel w/ Reflex to MG    Collection Time: 12/27/22  5:34 PM   Result Value Ref Range    Glucose 102 (H) 70 - 99 mg/dL    BUN 10 8 - 23 mg/dL    Creatinine 0.53 (L) 0.70 - 1.20 mg/dL    Est, Glom Filt Rate >60 >60 mL/min/1.73m2    Calcium 7.5 (L) 8.6 - 10.4 mg/dL    Sodium 129 (L) 135 - 144 mmol/L    Potassium 3.8 3.7 - 5.3 mmol/L    Chloride 98 98 - 107 mmol/L    CO2 21 20 - 31 mmol/L    Anion Gap 10 9 - 17 mmol/L    Alkaline Phosphatase 209 (H) 40 - 129 U/L    ALT 16 5 - 41 U/L    AST 45 (H) <40 U/L    Total Bilirubin 2.3 (H) 0.3 - 1.2 mg/dL    Total Protein 4.5 (L) 6.4 - 8.3 g/dL    Albumin 2.3 (L) 3.5 - 5.2 g/dL   Lipase    Collection Time: 12/27/22  5:34 PM   Result Value Ref Range    Lipase 18 13 - 60 U/L   Magnesium    Collection Time: 12/27/22  5:34 PM   Result Value Ref Range    Magnesium 1.5 (L) 1.6 - 2.6 mg/dL   Ammonia    Collection Time: 12/27/22  5:34 PM   Result Value Ref Range    Ammonia 41 16 - 60 umol/L   TYPE AND CROSSMATCH    Collection Time: 12/27/22  6:06 PM   Result Value Ref Range Expiration Date 12/30/2022,2359     Arm Band Number [id number]     ABO/Rh AB POSITIVE     Antibody Screen NEGATIVE     Unit Number [id number]     Product Code Leukocyte Reduced Red Cell     Unit Divison 00     Dispense Status ALLOCATED     Transfusion Status OK TO TRANSFUSE     Crossmatch Result COMPATIBLE     Unit Number [id number]     Product Code Leukocyte Reduced Red Cell     Unit Divison 00     Dispense Status ISSUED     Unit Issue Date/Time [id number]     Product Code Blood Bank [id number]     Blood Bank Unit Type and Rh O POS     Blood Bank ISBT Product Blood Type 5100     Blood Bank Blood Product Expiration Date 938121847444     Transfusion Status OK TO TRANSFUSE     Crossmatch Result COMPATIBLE        Imaging/Diagnostics:  XR CHEST (SINGLE VIEW FRONTAL)    Result Date: 12/27/2022  1. Minimal bibasilar atelectatic changes with trace left pleural fluid which is slightly decreased from 12/09/2022. CT ABDOMEN PELVIS W IV CONTRAST Additional Contrast? None    Result Date: 12/27/2022  Stable circumferential wall thickening of the distal esophagus and gastroesophageal junction consistent with known esophageal malignancy. No significant change since prior examination. No esophageal obstruction. Cirrhotic liver with associated portal venous hypertension, progressive ascites and stable splenomegaly. TIPS appears in place and patent Cholelithiasis Interval development of bibasal infiltrates and moderate bilateral pleural effusions as well as mild-to-moderate pericardial effusion     IR US GUIDED PARACENTESIS    Result Date: 12/23/2022  Successful ultrasound-guided paracentesis.          Plan:     Patient status inpatient in the Progressive Unit/Step down    Symptomatic anemia  -Does have history of GI bleed, alcoholic cirrhosis of liver  -hemoglobin 5.0. 2 units PRBC ordered   -protonix IV  -Hold VTE d/t bleeding  -Consult GI  -NPO  -Pain and Nausea control    Alcoholic cirrhosis with ascites  -Weekly paracentesis scheduled on Fridays  -Continue home dose of Lasix  -Continue home dose spironolactone  -GI consulted in ED  -Ammonia level 41  -continue home dose of Lactulose    Full code    MASSIMO Diaz - NP  12/28/2022  1:25 AM      Please note that this chart was generated using voice recognition Dragon dictation software. Although every effort was made to ensure the accuracy of this automated transcription, some errors in transcription may have occurred. Dinah [address] [place] [address].    Phone [phone]

## 2022-12-28 NOTE — H&P
CELSA Boyle 52    HISTORY AND PHYSICAL EXAMINATION            Date:   12/28/2022  Patient name:  Yaneth Akins  Date of admission:  12/27/2022  4:56 PM  MRN:   [id number]  Account:  [de-identified]  YOB: 1960  PCP:    VASU Givens  Room:   2101/2101-01  Code Status:    Full Code    Chief Complaint:     Chief Complaint   Patient presents with    Abdominal Pain       History Obtained From:     patient, electronic medical record    History of Present Illness: The patient is a 60 y.o.   Non- / non  male who presents with Abdominal Pain   and he is admitted to the hospital for the management of    Patient has past medical multiple medical problems and regularly cirrhosis of liver, ascites, Lezama's esophagus history of esophageal cancer, s/p TIPS procedure, patient undergo serial paracentesis  Patient admitted via emergency room with complaints of black tarry stools, abdominal pain, denying complaint of nausea, emesis  Patient had history of colonoscopy in the past suggestive of portal gastropathy, had ectasia requiring APC treatment  Patient in emergency room was found to have hemoglobin of 5, was given 2 unit of packed cell transfusion  Underwent CT abdomen pelvis in emergency room suggestive of chronic changes    Past Medical History:     Past Medical History:   Diagnosis Date    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 ([address]     Alcoholic cirrhosis of liver with ascites ([address]     Anemia 04/13/2022    Anxiety     Arthritis     Back pain, chronic     dr. Dre Hinds, orthopedic, every 3-4 months, gets steroid injection    Lezama esophagus     BPH (benign prostatic hypertrophy)     Cholelithiasis     Cirrhosis ([address]     COVID-19 12/2020    pt reports he had a positive test while at [facility] in 2020, was asymptomatic    COVID-19 vaccine series completed 5/20/2021, 6/22/2021    Moderna 5/20/2021, 6/22/2021    DDD (degenerative disc disease), lumbar Depression     Esophageal cancer ([address]     INVASIVE ADENOCARCINOMA ARISING IN TUBULAR ADENOMA WITH HIGH GRADE DYSPLASIA, ASSOCIATED WITH FOCAL INTESTINAL METAPLASIA     Esophageal varices (HCC)     Fatty liver     GERD (gastroesophageal reflux disease)     GI bleed     Hep C w/o coma, chronic ([address]     History of alcohol abuse     6-12 beers a day; quit drinking 2019    History of blood transfusion     History of colon polyps 2016    History of tobacco abuse     Ione (hard of hearing)     Hyperlipidemia     Hypertension     Hyponatremia 07/20/2016    Hypotension 12/20/2021    PONV (postoperative nausea and vomiting)     Port-A-Cath in place     right upper chest    Portal hypertension ([address]     Sciatica     Secondary esophageal varices ([address] 06/07/2022    Shortness of breath     Spinal stenosis     Stomach ulcer     hx of    Thrombocytopenia ([address] 12/23/2020    Tubular adenoma of colon 2016, 2018    Vitamin D deficiency     Wears glasses         Past Surgical History:     Past Surgical History:   Procedure Laterality Date    BUNIONECTOMY      twice on right side    BUNIONECTOMY Left     CARPAL TUNNEL RELEASE Right     COLONOSCOPY      at age 35    COLONOSCOPY  10/05/2016    polyps-pathology tubular adenoma, and abnormal looking mucosa right colon-pathology-tubular adenoma    COLONOSCOPY N/A 03/30/2018    COLONOSCOPY POLYPECTOMY COLD BIOPSY performed by Casey Adkins MD at [address] [place] [address]  03/30/2018    Small polyp in the sigmoid colon and excised with biopsy forceps--tubular adenoma    COLONOSCOPY N/A 04/16/2022    COLONOSCOPY POLYPECTOMY performed by Casey Adkins MD at [facility], DIAGNOSTIC      EGD    IR PORT PLACEMENT EQUAL OR GREATER THAN 5 YEARS  04/19/2021    IR PORT PLACEMENT EQUAL OR GREATER THAN 5 YEARS 4/19/2021 [facility]    IR TIPS INSERTION  12/01/2022    IR TIPS INSERTION 12/1/2022 Elier Harrison MD ST [facility]    KNEE SURGERY Left     cyst removed    NASAL SEPTUM SURGERY      NERVE BLOCK Right 11/23/2020    NERVE BLOCK RIGHT CERVICAL STEROID INJECTION  C3-C6 performed by Ashley Mreritt MD at [address] [place] [address]  01/04/2016    steroid injection C7 T1    OTHER SURGICAL HISTORY  11/21/2016    Bilateral Lumbar CACHORRO L4-L5 injections    OTHER SURGICAL HISTORY  12/19/2016    lumbar steroid injection    OTHER SURGICAL HISTORY  09/28/2018    BILATERAL L5 CACHORRO (N/A Back)    OTHER SURGICAL HISTORY Right 11/23/2020    cervical injection    PAIN MANAGEMENT PROCEDURE Left 07/09/2020    EPIDURAL STEROID INJECTION LEFT L4 L5 performed by Ashley Merritt MD at [address] Left 07/20/2020    LEFT L4 L5 EPIDURAL STEROID INJECTION performed by Ashley Merritt MD at [address] [place] [address] Bilateral 08/17/2020    LUMBAR FACET BILATERAL L2-L5 performed by Ashley Merritt MD at [address] [place] [address] Bilateral 12/07/2020    NERVE BLOCK BILATERAL LUMBAR MEDIAL BRANCH L2-L5 performed by Ashley Merritt MD at [address] [place], [place] [address]o [facility] AA&/STRD TFRML EPI LUMBAR/SACRAL 1 LEVEL Bilateral 09/06/2018    BILATERAL L5 CACHORRO performed by Ashley Merritt MD at [facility] AA&/STRD TFRML EPI LUMBAR/SACRAL 1 LEVEL N/A 09/28/2018    BILATERAL L5 CACHORRO performed by Ashley Merritt MD at [address] [place] [address] N/CARPAL TUNNEL SURG Right 08/29/2017    CARPAL TUNNEL RELEASE RIGHT performed by Jonathan Murray MD at [address] [place] [address] N/CARPAL TUNNEL SURG Left 10/31/2017    CARPAL TUNNEL RELEASE performed by Jonathan Murray MD at [address] 12/29/2020    EGD BIOPSY performed by Xavier Craig MD at [address] 02/02/2021    EGD BIOPSY and spot marking performed by Melani Carter MD at [address] 02/12/2021    ENDOSCOPIC ULTRASOUND, EGD performed by Angelique Shanks MD at [address] [facility] [address] ENDOSCOPY  02/12/2021    EGD DIAGNOSTIC ONLY performed by Ximena Joyce MD at [address] [facility] 08/31/2021    EGD BIOPSY performed by Sean Baker MD at [address] [place] [address] 01/21/2022    EGD BIOPSY performed by Sean Baker MD at [address] [place] [address] N/A 04/15/2022    EGD ESOPHAGOGASTRODUODENOSCOPY performed by Ruthann Walker MD at [address] [facility] N/A 06/06/2022    EGD BAND LIGATION performed by Ileana Hughes MD at [address] [place] [address] N/A 06/09/2022    EGD ESOPHAGOGASTRODUODENOSCOPY performed by Ileana Hughes MD at [address] [place] [address] N/A 09/14/2022    EGD ESOPHAGOGASTRODUODENOSCOPY ENDOSCOPIC APC AT GE JUNCTION performed by Sona Grey MD at [address] [place] [address] 10/19/2022    EGD BIOPSY performed by Sean Baker MD at [address] [place] [address] 10/31/2022    EGD with APC performed by Sean Baker MD at [address] [place] [address]          Medications Prior to Admission:     Prior to Admission medications    Medication Sig Start Date End Date Taking? Authorizing Provider   diphenhydrAMINE (BENADRYL) 25 MG tablet Take 1 tablet by mouth every 6 hours as needed for Itching  Patient not taking: No sig reported 12/2/22 1/1/23  Violet Paris P Blood, DO   pantoprazole (PROTONIX) 40 MG tablet Take 40 mg by mouth daily 10/4/22   Historical Provider, MD   FEROSUL 325 (65 Fe) MG tablet take 1 tablet by mouth twice a day 10/7/22   VASU Givens   nadolol (CORGARD) 20 MG tablet take 1 tablet by mouth once daily 8/26/22   Aurelia Lombard, APRN - NP   midodrine (PROAMATINE) 5 MG tablet Take 2 tablets by mouth in the morning and 2 tablets at noon and 2 tablets before bedtime.   Patient not taking: Reported on 12/27/2022 8/4/22   Yassine Daren, APRN - NP   furosemide (LASIX) 20 MG tablet Take 1 tablet by mouth 2 times daily 6/23/22   Yassine Mercedes APRN - NP   spironolactone (ALDACTONE) 100 MG tablet Take 1 tablet by mouth every evening 6/23/22   Yassine Daren, APRN - NP   ondansetron (ZOFRAN-ODT) 4 MG disintegrating tablet dissolve 1 tablet by mouth every 8 hours if needed for nausea and vomiting  Patient not taking: No sig reported 5/31/22   VASU Mcclain   lactulose [facility]) 10 GM/15ML solution take 30 milliliters by mouth three times a day 5/31/22   VASU Mcclain   FLUoxetine (PROZAC) 20 MG capsule Take 1 capsule by mouth daily 4/21/22   Hallie Fisher MD   atorvastatin (LIPITOR) 20 MG tablet Take 1 tablet by mouth nightly 4/21/22   Hallie Fisher MD        Allergies:     Patient has no known allergies. Social History:     Tobacco:    reports that he quit smoking about 5 years ago. His smoking use included cigarettes. He has a 45.00 pack-year smoking history. He has never used smokeless tobacco.  Alcohol:      reports that he does not currently use alcohol. Drug Use:  reports that he does not currently use drugs after having used the following drugs: Cocaine. Frequency: 1.00 time per week. Family History:     Family History   Problem Relation Age of Onset    Cancer Mother         pancreatic    Cancer Father         bone    Diabetes Sister     Asthma Brother     Allergies Brother         MULTIPLE       Review of Systems:     Positive and Negative as described in HPI. CONSTITUTIONAL:  negative for fevers, chills, sweats, fatigue, weight loss  HEENT:  negative for vision, hearing changes, runny nose, throat pain  RESPIRATORY:  negative for shortness of breath, cough, congestion, wheezing. CARDIOVASCULAR:  negative for chest pain, palpitations.   GASTROINTESTINAL: Abdominal pain, black-colored stools  GENITOURINARY:  negative for difficulty of urination, burning with urination, frequency   INTEGUMENT:  negative for rash, skin lesions, easy bruising   HEMATOLOGIC/LYMPHATIC:  negative for swelling/edema   ALLERGIC/IMMUNOLOGIC:  negative for urticaria , itching  ENDOCRINE:  negative increase in drinking, increase in urination, hot or cold intolerance  MUSCULOSKELETAL:  negative joint pains, muscle aches, swelling of joints  NEUROLOGICAL:  negative for headaches, dizziness, lightheadedness, numbness, pain, tingling extremities  BEHAVIOR/PSYCH:  negative for depression, anxiety    Physical Exam:   /69   Pulse (!) 106   Temp 98.8 °F (37.1 °C) (Oral)   Resp 20   Ht 5' 10\" (1.778 m)   Wt 197 lb 5 oz (89.5 kg)   SpO2 96%   BMI 28.31 kg/m²   Temp (24hrs), Av.5 °F (36.9 °C), Min:97.9 °F (36.6 °C), Max:98.8 °F (37.1 °C)    No results for input(s): POCGLU in the last 72 hours. Intake/Output Summary (Last 24 hours) at 2022 1242  Last data filed at 2022 1146  Gross per 24 hour   Intake 931.67 ml   Output 250 ml   Net 681.67 ml       General Appearance:  alert, well appearing, and in no acute distress  Mental status: oriented to person, place, and time with normal affect  Head:  normocephalic, atraumatic. Eye: no icterus, redness, pupils equal and reactive, extraocular eye movements intact, conjunctiva clear  Ear: normal external ear, no discharge, hearing intact  Nose:  no drainage noted  Mouth: mucous membranes moist  Neck: supple, no carotid bruits, thyroid not palpable  Lungs: Bilateral equal air entry, clear to ausculation, no wheezing, rales or rhonchi, normal effort  Cardiovascular: normal rate, regular rhythm, no murmur, gallop, rub.   Abdomen: Soft, nontender, nondistended, normal bowel sounds, no hepatomegaly or splenomegaly  Neurologic: There are no new focal motor or sensory deficits, normal muscle tone and bulk, no abnormal sensation, normal speech, cranial nerves II through XII grossly intact  Skin: No gross lesions, rashes, bruising or bleeding on exposed skin area  Extremities:  peripheral pulses palpable, no pedal edema or calf pain with palpation  Psych: normal affect     Investigations:      Laboratory Testing:  Recent Results (from the past 24 hour(s))   CBC with Auto Differential    Collection Time: 12/27/22  5:34 PM   Result Value Ref Range    WBC 3.6 3.5 - 11.0 k/uL    RBC 2.12 (L) 4.5 - 5.9 m/uL    Hemoglobin 5.0 (LL) 13.5 - 17.5 g/dL    Hematocrit 16.6 (L) 41 - 53 %    MCV 78.3 (L) 80 - 100 fL    MCH 23.4 (L) 26 - 34 pg    MCHC 29.9 (L) 31 - 37 g/dL    RDW 18.4 (H) 11.5 - 14.9 %    Platelets 74 (L) 428 - 450 k/uL    MPV 7.2 6.0 - 12.0 fL    Seg Neutrophils 88 (H) 36 - 66 %    Lymphocytes 6 (L) 24 - 44 %    Monocytes 4 1 - 7 %    Eosinophils % 2 0 - 4 %    Basophils 0 0 - 2 %    Segs Absolute 3.17 1.3 - 9.1 k/uL    Absolute Lymph # 0.22 (L) 1.0 - 4.8 k/uL    Absolute Mono # 0.14 0.1 - 1.3 k/uL    Absolute Eos # 0.07 0.0 - 0.4 k/uL    Basophils Absolute 0.00 0.0 - 0.2 k/uL    Morphology ANISOCYTOSIS PRESENT     Morphology HYPOCHROMIA PRESENT    Comprehensive Metabolic Panel w/ Reflex to MG    Collection Time: 12/27/22  5:34 PM   Result Value Ref Range    Glucose 102 (H) 70 - 99 mg/dL    BUN 10 8 - 23 mg/dL    Creatinine 0.53 (L) 0.70 - 1.20 mg/dL    Est, Glom Filt Rate >60 >60 mL/min/1.73m2    Calcium 7.5 (L) 8.6 - 10.4 mg/dL    Sodium 129 (L) 135 - 144 mmol/L    Potassium 3.8 3.7 - 5.3 mmol/L    Chloride 98 98 - 107 mmol/L    CO2 21 20 - 31 mmol/L    Anion Gap 10 9 - 17 mmol/L    Alkaline Phosphatase 209 (H) 40 - 129 U/L    ALT 16 5 - 41 U/L    AST 45 (H) <40 U/L    Total Bilirubin 2.3 (H) 0.3 - 1.2 mg/dL    Total Protein 4.5 (L) 6.4 - 8.3 g/dL    Albumin 2.3 (L) 3.5 - 5.2 g/dL   Lipase    Collection Time: 12/27/22  5:34 PM   Result Value Ref Range    Lipase 18 13 - 60 U/L   Magnesium    Collection Time: 12/27/22  5:34 PM   Result Value Ref Range    Magnesium 1.5 (L) 1.6 - 2.6 mg/dL   Ammonia    Collection Time: 12/27/22  5:34 PM   Result Value Ref Range Ammonia 41 16 - 60 umol/L   TYPE AND CROSSMATCH    Collection Time: 12/27/22  6:06 PM   Result Value Ref Range    Expiration Date 12/30/2022,2359     Arm Band Number [id number]     ABO/Rh AB POSITIVE     Antibody Screen NEGATIVE     Unit Number [id number]     Product Code Leukocyte Reduced Red Cell     Unit Divison 00     Dispense Status ISSUED     Unit Issue Date/Time 475475880726     Product Code Blood Bank [id number]     Blood Bank Unit Type and Rh O POS     Blood Bank ISBT Product Blood Type 5100     Blood Bank Blood Product Expiration Date 370288173972     Transfusion Status OK TO TRANSFUSE     Crossmatch Result COMPATIBLE     Unit Number [id number]     Product Code Leukocyte Reduced Red Cell     Unit Divison 00     Dispense Status ISSUED     Unit Issue Date/Time 199090109263     Product Code Blood Bank [id number]     Blood Bank Unit Type and Rh O POS     Blood Bank ISBT Product Blood Type 5100     Blood Bank Blood Product Expiration Date 853227531243     Transfusion Status OK TO TRANSFUSE     Crossmatch Result COMPATIBLE    Protime-INR    Collection Time: 12/28/22  6:01 AM   Result Value Ref Range    Protime 17.0 (H) 11.8 - 14.6 sec    INR 1.4    APTT    Collection Time: 12/28/22  6:01 AM   Result Value Ref Range    PTT 30.3 24.0 - 36.0 sec   Iron and TIBC    Collection Time: 12/28/22  6:01 AM   Result Value Ref Range    Iron 69 59 - 158 ug/dL    TIBC 233 (L) 250 - 450 ug/dL    Iron Saturation 30 20 - 55 %    UIBC 164 112 - 347 ug/dL   Basic Metabolic Panel w/ Reflex to MG    Collection Time: 12/28/22  6:01 AM   Result Value Ref Range    Glucose 81 70 - 99 mg/dL    BUN 12 8 - 23 mg/dL    Creatinine 0.54 (L) 0.70 - 1.20 mg/dL    Est, Glom Filt Rate >60 >60 mL/min/1.73m2    Calcium 8.0 (L) 8.6 - 10.4 mg/dL    Sodium 135 135 - 144 mmol/L    Potassium 4.0 3.7 - 5.3 mmol/L    Chloride 102 98 - 107 mmol/L    CO2 22 20 - 31 mmol/L    Anion Gap 11 9 - 17 mmol/L   Hemoglobin and Hematocrit    Collection Time: 12/28/22 6:01 AM   Result Value Ref Range    Hemoglobin 6.2 (LL) 13.5 - 17.5 g/dL    Hematocrit 20.5 (L) 41 - 53 %   Hemoglobin and Hematocrit    Collection Time: 12/28/22  9:10 AM   Result Value Ref Range    Hemoglobin 5.6 (LL) 13.5 - 17.5 g/dL    Hematocrit 18.1 (L) 41 - 53 %       Imaging/Diagnostics:    Assessment :      Primary Problem  Symptomatic anemia    Active Hospital Problems    Diagnosis Date Noted    Symptomatic anemia, microcytic, acute [D64.9] 12/20/2021       Plan:     Patient status Admit as inpatient in the  Progressive Unit/Step down    Patient, admitted with GI bleed, history of esophageal cancer, Lezama's esophagus, had ectasia and varices in the past, required APC treatment, GI consulted  Starting patient Protonix and octreotide drip for now  H&H every 6  Packed cell transfusion to keep hemoglobin more than 7  Will keep SSRI on hold  Overall prognosis guarded given advanced liver disease    Consultations:   IP CONSULT TO INTERNAL MEDICINE  IP CONSULT TO GI    Patient is admitted as inpatient status because of co-morbidities listed above, severity of signs and symptoms as outlined, requirement for current medical therapies and most importantly because of direct risk to patient if care not provided in a hospital setting. Jonathon Ying MD  12/28/2022  12:42 PM    Copy sent to VASU Bonner    Please note that this chart was generated using voice recognition Dragon dictation software. Although every effort was made to ensure the accuracy of this automated transcription, some errors in transcription may have occurred.

## 2022-12-28 NOTE — ED NOTES
Pt updated to admission, report called to floor via phone. Blood consent signed.       Neela Lozoya RN  12/27/22 [id number]

## 2022-12-28 NOTE — CONSENT
Informed Consent for Blood Component Transfusion Note    I have discussed with the patient the rationale for blood component transfusion; its benefits in treating or preventing fatigue, organ damage, or death; and its risk which includes mild transfusion reactions, rare risk of blood borne infection, or more serious but rare reactions. I have discussed the alternatives to transfusion, including the risk and consequences of not receiving transfusion. The patient had an opportunity to ask questions and had agreed to proceed with transfusion of blood components.     Electronically signed by Adriel Bynum DO on 12/27/22 at 11:27 PM EST

## 2022-12-28 NOTE — ED NOTES
Physician cart side to review blood administration benefit and risks. Pt in agreement with accepting blood transfusion as recommended. Blood consent signed, with this nurse as witness.        Chi Pruitt RN  12/27/22 [id number]

## 2022-12-28 NOTE — PROGRESS NOTES
Writer started blood transfusion at [facility], stayed with patient for 15 minutes, VS taken and stable. Will continue to monitor.

## 2022-12-28 NOTE — BRIEF OP NOTE
Brief Postoperative Note    Mary Colon  YOB: 1960  [id number]    Pre-operative Diagnosis: Recurrent ascites; abdominal discomfort    Post-operative Diagnosis: Same    Procedure: US guided paracentesis     Anesthesia: Local    Surgeons/Assistants: Gregg    Estimated Blood Loss: less than 50     Complications: None    Specimens: Was Obtained: non turbid stephanie fluid     Electronically signed by Jimi Patricio MD on 12/28/2022 at 3:40 PM

## 2022-12-28 NOTE — PLAN OF CARE
Problem: Discharge Planning  Goal: Discharge to home or other facility with appropriate resources  12/28/2022 1840 by Carlton Stewart RN  Outcome: Progressing  Flowsheets (Taken 12/28/2022 1840)  Discharge to home or other facility with appropriate resources:   Identify barriers to discharge with patient and caregiver   Arrange for needed discharge resources and transportation as appropriate   Refer to discharge planning if patient needs post-hospital services based on physician order or complex needs related to functional status, cognitive ability or social support system  Note: Marcia Martines working on transferring patient to [facility]. V's for TIPS procedure check.      Problem: Safety - Adult  Goal: Free from fall injury  12/28/2022 1840 by Carlton Stewart, RN  Outcome: Progressing  Flowsheets (Taken 12/28/2022 1840)  Free From Fall Injury:   Instruct family/caregiver on patient safety   Based on caregiver fall risk screen, instruct family/caregiver to ask for assistance with transferring infant if caregiver noted to have fall risk factors     Problem: ABCDS Injury Assessment  Goal: Absence of physical injury  12/28/2022 1840 by Carlton Stewart, RN  Outcome: Progressing  Flowsheets (Taken 12/28/2022 1840)  Absence of Physical Injury: Implement safety measures based on patient assessment

## 2022-12-28 NOTE — PROGRESS NOTES
Medication History completed:    New medications: none    Medications discontinued: none    Medications flagged for review:   Diphenhydramine - not taking  Midodrine - not taking  Ondansetron - not taking    Changes to dosing: none    Stated allergies: NKDA    Other pertinent information: Medications confirmed with patient.      Thank you,  Rukhsana Rosado, PharmD, BCPS  [phone]

## 2022-12-29 ENCOUNTER — ANESTHESIA EVENT (OUTPATIENT)
Dept: OPERATING ROOM | Age: 62
End: 2022-12-29
Payer: MEDICARE

## 2022-12-29 ENCOUNTER — ANESTHESIA (OUTPATIENT)
Dept: OPERATING ROOM | Age: 62
End: 2022-12-29
Payer: MEDICARE

## 2022-12-29 PROBLEM — D62 ACUTE BLOOD LOSS ANEMIA: Status: ACTIVE | Noted: 2022-12-29

## 2022-12-29 PROBLEM — I86.4 BLEEDING GASTRIC VARICES: Status: ACTIVE | Noted: 2022-12-29

## 2022-12-29 PROBLEM — K70.31 ASCITES DUE TO ALCOHOLIC CIRRHOSIS (HCC): Status: ACTIVE | Noted: 2022-04-26

## 2022-12-29 PROBLEM — R10.33 PERIUMBILICAL ABDOMINAL PAIN: Status: ACTIVE | Noted: 2022-12-29

## 2022-12-29 PROBLEM — R55 NEAR SYNCOPE: Status: ACTIVE | Noted: 2022-12-29

## 2022-12-29 PROBLEM — I85.01 ESOPHAGEAL VARICES WITH BLEEDING (HCC): Status: ACTIVE | Noted: 2022-06-07

## 2022-12-29 PROBLEM — K76.82 HEPATIC ENCEPHALOPATHY (HCC): Status: ACTIVE | Noted: 2022-12-29

## 2022-12-29 LAB
ABSOLUTE EOS #: 0.04 K/UL (ref 0–0.44)
ABSOLUTE IMMATURE GRANULOCYTE: 0.04 K/UL (ref 0–0.3)
ABSOLUTE LYMPH #: 0.31 K/UL (ref 1.1–3.7)
ABSOLUTE MONO #: 0.75 K/UL (ref 0.1–1.2)
ALBUMIN FLUID: 0.4 G/DL
ANION GAP SERPL CALCULATED.3IONS-SCNC: 10 MMOL/L (ref 9–17)
ANION GAP SERPL CALCULATED.3IONS-SCNC: 13 MMOL/L (ref 9–17)
APPEARANCE FLUID: ABNORMAL
BASO FLUID: ABNORMAL %
BASOPHILS # BLD: 0 % (ref 0–2)
BASOPHILS ABSOLUTE: 0 K/UL (ref 0–0.2)
BUN BLDV-MCNC: 11 MG/DL (ref 8–23)
BUN BLDV-MCNC: 11 MG/DL (ref 8–23)
CALCIUM SERPL-MCNC: 7.6 MG/DL (ref 8.6–10.4)
CALCIUM SERPL-MCNC: 7.9 MG/DL (ref 8.6–10.4)
CASE NUMBER:: NORMAL
CHLORIDE BLD-SCNC: 100 MMOL/L (ref 98–107)
CHLORIDE BLD-SCNC: 98 MMOL/L (ref 98–107)
CO2: 19 MMOL/L (ref 20–31)
CO2: 22 MMOL/L (ref 20–31)
COLOR FLUID: YELLOW
CREAT SERPL-MCNC: 0.61 MG/DL (ref 0.7–1.2)
CREAT SERPL-MCNC: 0.65 MG/DL (ref 0.7–1.2)
EOSINOPHIL FLUID: ABNORMAL %
EOSINOPHILS RELATIVE PERCENT: 1 % (ref 1–4)
FLUID DIFF COMMENT: ABNORMAL
GFR SERPL CREATININE-BSD FRML MDRD: >60 ML/MIN/1.73M2
GFR SERPL CREATININE-BSD FRML MDRD: >60 ML/MIN/1.73M2
GLUCOSE BLD-MCNC: 79 MG/DL (ref 70–99)
GLUCOSE BLD-MCNC: 86 MG/DL (ref 70–99)
HCT VFR BLD CALC: 23.1 % (ref 40.7–50.3)
HCT VFR BLD CALC: 24.5 % (ref 40.7–50.3)
HCT VFR BLD CALC: 27.1 % (ref 40.7–50.3)
HCT VFR BLD CALC: 30.3 % (ref 40.7–50.3)
HEMOGLOBIN: 7 G/DL (ref 13–17)
HEMOGLOBIN: 7.2 G/DL (ref 13–17)
HEMOGLOBIN: 8.1 G/DL (ref 13–17)
HEMOGLOBIN: 9 G/DL (ref 13–17)
IMMATURE GRANULOCYTES: 1 %
INR BLD: 1.3
IRON SATURATION: 16 % (ref 20–55)
IRON: 29 UG/DL (ref 59–158)
LYMPHOCYTES # BLD: 7 % (ref 24–43)
LYMPHOCYTES, BODY FLUID: 8 %
MAGNESIUM: 1.5 MG/DL (ref 1.6–2.6)
MAGNESIUM: 1.6 MG/DL (ref 1.6–2.6)
MCH RBC QN AUTO: 25.1 PG (ref 25.2–33.5)
MCHC RBC AUTO-ENTMCNC: 30.3 G/DL (ref 28.4–34.8)
MCV RBC AUTO: 82.8 FL (ref 82.6–102.9)
MONOCYTE, FLUID: ABNORMAL %
MONOCYTES # BLD: 17 % (ref 3–12)
MORPHOLOGY: ABNORMAL
NEUTROPHIL, FLUID: 3 %
NRBC AUTOMATED: 0.5 PER 100 WBC
OTHER CELLS FLUID: 89 %
PARTIAL THROMBOPLASTIN TIME: 29 SEC (ref 20.5–30.5)
PDW BLD-RTO: 17.2 % (ref 11.8–14.4)
PLATELET # BLD: ABNORMAL K/UL (ref 138–453)
PLATELET, FLUORESCENCE: NORMAL K/UL (ref 138–453)
POTASSIUM SERPL-SCNC: 3.5 MMOL/L (ref 3.7–5.3)
POTASSIUM SERPL-SCNC: 3.5 MMOL/L (ref 3.7–5.3)
PROTHROMBIN TIME: 13.8 SEC (ref 9.1–12.3)
RBC # BLD: 2.79 M/UL (ref 4.21–5.77)
RBC FLUID: 289 /MM3
SEG NEUTROPHILS: 74 % (ref 36–65)
SEGMENTED NEUTROPHILS ABSOLUTE COUNT: 3.26 K/UL (ref 1.5–8.1)
SODIUM BLD-SCNC: 130 MMOL/L (ref 135–144)
SODIUM BLD-SCNC: 132 MMOL/L (ref 135–144)
SPECIMEN DESCRIPTION: NORMAL
SPECIMEN TYPE: ABNORMAL
SPECIMEN TYPE: NORMAL
SPECIMEN TYPE: NORMAL
TOTAL IRON BINDING CAPACITY: 176 UG/DL (ref 250–450)
TOTAL PROTEIN, BODY FLUID: <1 G/DL
UNSATURATED IRON BINDING CAPACITY: 147 UG/DL (ref 112–347)
WBC # BLD: 4.4 K/UL (ref 3.5–11.3)
WBC FLUID: 98 /MM3

## 2022-12-29 PROCEDURE — 2060000000 HC ICU INTERMEDIATE R&B

## 2022-12-29 PROCEDURE — 0DJ08ZZ INSPECTION OF UPPER INTESTINAL TRACT, VIA NATURAL OR ARTIFICIAL OPENING ENDOSCOPIC: ICD-10-PCS | Performed by: INTERNAL MEDICINE

## 2022-12-29 PROCEDURE — C9113 INJ PANTOPRAZOLE SODIUM, VIA: HCPCS | Performed by: NURSE PRACTITIONER

## 2022-12-29 PROCEDURE — P9016 RBC LEUKOCYTES REDUCED: HCPCS

## 2022-12-29 PROCEDURE — 83540 ASSAY OF IRON: CPT

## 2022-12-29 PROCEDURE — 2580000003 HC RX 258: Performed by: INTERNAL MEDICINE

## 2022-12-29 PROCEDURE — 2580000003 HC RX 258: Performed by: NURSE ANESTHETIST, CERTIFIED REGISTERED

## 2022-12-29 PROCEDURE — 7100000000 HC PACU RECOVERY - FIRST 15 MIN: Performed by: INTERNAL MEDICINE

## 2022-12-29 PROCEDURE — 2580000003 HC RX 258: Performed by: NURSE PRACTITIONER

## 2022-12-29 PROCEDURE — 85018 HEMOGLOBIN: CPT

## 2022-12-29 PROCEDURE — 83735 ASSAY OF MAGNESIUM: CPT

## 2022-12-29 PROCEDURE — 3700000000 HC ANESTHESIA ATTENDED CARE: Performed by: INTERNAL MEDICINE

## 2022-12-29 PROCEDURE — 85014 HEMATOCRIT: CPT

## 2022-12-29 PROCEDURE — 94761 N-INVAS EAR/PLS OXIMETRY MLT: CPT

## 2022-12-29 PROCEDURE — 36415 COLL VENOUS BLD VENIPUNCTURE: CPT

## 2022-12-29 PROCEDURE — 6360000002 HC RX W HCPCS: Performed by: NURSE PRACTITIONER

## 2022-12-29 PROCEDURE — 2500000003 HC RX 250 WO HCPCS: Performed by: NURSE ANESTHETIST, CERTIFIED REGISTERED

## 2022-12-29 PROCEDURE — 6360000002 HC RX W HCPCS: Performed by: INTERNAL MEDICINE

## 2022-12-29 PROCEDURE — 85730 THROMBOPLASTIN TIME PARTIAL: CPT

## 2022-12-29 PROCEDURE — 99223 1ST HOSP IP/OBS HIGH 75: CPT | Performed by: INTERNAL MEDICINE

## 2022-12-29 PROCEDURE — 6360000002 HC RX W HCPCS: Performed by: NURSE ANESTHETIST, CERTIFIED REGISTERED

## 2022-12-29 PROCEDURE — 6370000000 HC RX 637 (ALT 250 FOR IP): Performed by: NURSE PRACTITIONER

## 2022-12-29 PROCEDURE — 7100000001 HC PACU RECOVERY - ADDTL 15 MIN: Performed by: INTERNAL MEDICINE

## 2022-12-29 PROCEDURE — 3700000001 HC ADD 15 MINUTES (ANESTHESIA): Performed by: INTERNAL MEDICINE

## 2022-12-29 PROCEDURE — 3609017100 HC EGD: Performed by: INTERNAL MEDICINE

## 2022-12-29 PROCEDURE — 2709999900 HC NON-CHARGEABLE SUPPLY: Performed by: INTERNAL MEDICINE

## 2022-12-29 PROCEDURE — 83550 IRON BINDING TEST: CPT

## 2022-12-29 PROCEDURE — 86900 BLOOD TYPING SEROLOGIC ABO: CPT

## 2022-12-29 PROCEDURE — 36430 TRANSFUSION BLD/BLD COMPNT: CPT

## 2022-12-29 PROCEDURE — 85610 PROTHROMBIN TIME: CPT

## 2022-12-29 PROCEDURE — 80048 BASIC METABOLIC PNL TOTAL CA: CPT

## 2022-12-29 RX ORDER — SODIUM CHLORIDE 9 MG/ML
INJECTION, SOLUTION INTRAVENOUS CONTINUOUS
Status: DISCONTINUED | OUTPATIENT
Start: 2022-12-29 | End: 2022-12-29 | Stop reason: HOSPADM

## 2022-12-29 RX ORDER — PROPOFOL 10 MG/ML
INJECTION, EMULSION INTRAVENOUS PRN
Status: DISCONTINUED | OUTPATIENT
Start: 2022-12-29 | End: 2022-12-29 | Stop reason: SDUPTHER

## 2022-12-29 RX ORDER — PROPOFOL 10 MG/ML
INJECTION, EMULSION INTRAVENOUS CONTINUOUS PRN
Status: DISCONTINUED | OUTPATIENT
Start: 2022-12-29 | End: 2022-12-29 | Stop reason: SDUPTHER

## 2022-12-29 RX ORDER — MAGNESIUM SULFATE IN WATER 40 MG/ML
2000 INJECTION, SOLUTION INTRAVENOUS PRN
Status: DISCONTINUED | OUTPATIENT
Start: 2022-12-29 | End: 2022-12-31

## 2022-12-29 RX ORDER — DIPHENHYDRAMINE HYDROCHLORIDE 50 MG/ML
25 INJECTION INTRAMUSCULAR; INTRAVENOUS EVERY 6 HOURS PRN
Status: DISCONTINUED | OUTPATIENT
Start: 2022-12-29 | End: 2023-01-03

## 2022-12-29 RX ORDER — SODIUM CHLORIDE, SODIUM LACTATE, POTASSIUM CHLORIDE, CALCIUM CHLORIDE 600; 310; 30; 20 MG/100ML; MG/100ML; MG/100ML; MG/100ML
INJECTION, SOLUTION INTRAVENOUS CONTINUOUS PRN
Status: DISCONTINUED | OUTPATIENT
Start: 2022-12-29 | End: 2022-12-29 | Stop reason: SDUPTHER

## 2022-12-29 RX ORDER — LIDOCAINE HYDROCHLORIDE 10 MG/ML
INJECTION, SOLUTION EPIDURAL; INFILTRATION; INTRACAUDAL; PERINEURAL PRN
Status: DISCONTINUED | OUTPATIENT
Start: 2022-12-29 | End: 2022-12-29 | Stop reason: SDUPTHER

## 2022-12-29 RX ORDER — POTASSIUM CHLORIDE 7.45 MG/ML
10 INJECTION INTRAVENOUS PRN
Status: DISCONTINUED | OUTPATIENT
Start: 2022-12-29 | End: 2023-01-02 | Stop reason: SDUPTHER

## 2022-12-29 RX ORDER — GLYCOPYRROLATE 0.2 MG/ML
INJECTION INTRAMUSCULAR; INTRAVENOUS PRN
Status: DISCONTINUED | OUTPATIENT
Start: 2022-12-29 | End: 2022-12-29 | Stop reason: SDUPTHER

## 2022-12-29 RX ORDER — SODIUM CHLORIDE 9 MG/ML
INJECTION, SOLUTION INTRAVENOUS PRN
Status: DISCONTINUED | OUTPATIENT
Start: 2022-12-29 | End: 2023-01-02 | Stop reason: ALTCHOICE

## 2022-12-29 RX ADMIN — SODIUM CHLORIDE, POTASSIUM CHLORIDE, SODIUM LACTATE AND CALCIUM CHLORIDE: 600; 310; 30; 20 INJECTION, SOLUTION INTRAVENOUS at 17:05

## 2022-12-29 RX ADMIN — SODIUM CHLORIDE: 9 INJECTION, SOLUTION INTRAVENOUS at 00:12

## 2022-12-29 RX ADMIN — GLYCOPYRROLATE 0.2 MG: 0.2 INJECTION INTRAMUSCULAR; INTRAVENOUS at 17:13

## 2022-12-29 RX ADMIN — SODIUM CHLORIDE: 9 INJECTION, SOLUTION INTRAVENOUS at 11:29

## 2022-12-29 RX ADMIN — LIDOCAINE HYDROCHLORIDE 50 MG: 10 INJECTION, SOLUTION EPIDURAL; INFILTRATION; INTRACAUDAL; PERINEURAL at 17:13

## 2022-12-29 RX ADMIN — SODIUM CHLORIDE: 9 INJECTION, SOLUTION INTRAVENOUS at 20:39

## 2022-12-29 RX ADMIN — SODIUM CHLORIDE, PRESERVATIVE FREE 10 ML: 5 INJECTION INTRAVENOUS at 09:00

## 2022-12-29 RX ADMIN — SODIUM CHLORIDE: 9 INJECTION, SOLUTION INTRAVENOUS at 02:28

## 2022-12-29 RX ADMIN — DIPHENHYDRAMINE HYDROCHLORIDE 25 MG: 50 INJECTION, SOLUTION INTRAMUSCULAR; INTRAVENOUS at 09:38

## 2022-12-29 RX ADMIN — PROPOFOL 50 MG: 10 INJECTION, EMULSION INTRAVENOUS at 17:15

## 2022-12-29 RX ADMIN — PIPERACILLIN AND TAZOBACTAM 3375 MG: 3; .375 INJECTION, POWDER, FOR SOLUTION INTRAVENOUS at 20:40

## 2022-12-29 RX ADMIN — MORPHINE SULFATE 4 MG: 4 INJECTION INTRAVENOUS at 00:15

## 2022-12-29 RX ADMIN — ONDANSETRON 4 MG: 4 TABLET, ORALLY DISINTEGRATING ORAL at 00:16

## 2022-12-29 RX ADMIN — PROPOFOL 30 MG: 10 INJECTION, EMULSION INTRAVENOUS at 17:17

## 2022-12-29 RX ADMIN — OCTREOTIDE ACETATE 25 MCG/HR: 500 INJECTION, SOLUTION INTRAVENOUS; SUBCUTANEOUS at 02:29

## 2022-12-29 RX ADMIN — OCTREOTIDE ACETATE 25 MCG/HR: 500 INJECTION, SOLUTION INTRAVENOUS; SUBCUTANEOUS at 23:23

## 2022-12-29 RX ADMIN — PIPERACILLIN AND TAZOBACTAM 3375 MG: 3; .375 INJECTION, POWDER, FOR SOLUTION INTRAVENOUS at 05:59

## 2022-12-29 RX ADMIN — SODIUM CHLORIDE 8 MG/HR: 9 INJECTION, SOLUTION INTRAVENOUS at 02:29

## 2022-12-29 RX ADMIN — PROPOFOL 200 MCG/KG/MIN: 10 INJECTION, EMULSION INTRAVENOUS at 17:17

## 2022-12-29 RX ADMIN — SODIUM CHLORIDE 8 MG/HR: 9 INJECTION, SOLUTION INTRAVENOUS at 15:07

## 2022-12-29 ASSESSMENT — PAIN - FUNCTIONAL ASSESSMENT
PAIN_FUNCTIONAL_ASSESSMENT: NONE - DENIES PAIN

## 2022-12-29 ASSESSMENT — ENCOUNTER SYMPTOMS: SHORTNESS OF BREATH: 1

## 2022-12-29 ASSESSMENT — PAIN SCALES - GENERAL: PAINLEVEL_OUTOF10: 7

## 2022-12-29 NOTE — PROGRESS NOTES
[facility]. [facility]   Gastroenterology Progress Note    Xavier Frey is a 60 y.o. male patient. Hospitalization Day:1      Chief consult reason:   GI bleed; EGD prior to TIPSS revision      Subjective:    -Patient examined at bedside. Patient awake, alert, oriented X 4,  no acute distress.  -Hemodynamically stable with blood pressure 122/62 mmHg and MAP of 81.  -Breathing comfortably on room air.  -Patient reports having generalized abdominal discomfort but no excruciating pain as of now. He does not report any overt signs of GI bleed and hematemesis, melena, bright red blood per rectum as of now  -Latest hemoglobin 7.2. VITALS:  /62   Pulse (!) 101   Temp 98.2 °F (36.8 °C) (Oral)   Resp 29   Ht 5' 10\" (1.778 m)   Wt 190 lb 11.2 oz (86.5 kg)   SpO2 95%   BMI 27.36 kg/m²   TEMPERATURE:  Current - Temp: 98.2 °F (36.8 °C); Max - Temp  Av.5 °F (36.9 °C)  Min: 97.9 °F (36.6 °C)  Max: 98.9 °F (37.2 °C)    History of present illness:    69-year-old patient with history of alcoholism and decompensated liver cirrhosis and multiple prior admissions for esophageal varices and bleeding with portal hypertension, ascites requiring routine paracentesis, Lezama's esophagus and esophageal cancer treated with chemotherapy 1.5 years ago presented with worsening abdominal ascites, melena, and progressive weakness. Patient was seen by Dr. Schmidt. According to the documentation, patient underwent TIPS procedure on 2022. Initial labs. White count 3.6, hemoglobin 13.0, platelet 74.  1 unit PRBC transfusion was done. CT abdomen and pelvis was performed which revealed circumferential wall thickening of distal esophagus and GE junction consistent with prior esophageal cancer, nodular liver with enlargement of the quadrate and left hepatic lobes pointing towards hepatic cirrhosis. TIPS stent was in place and patent per CT no thrombus was identified.   There was splenomegaly and portal venous hypertension, perigastric varices, and esophageal varices. Upper abdominal varices were also identified. Patient underwent paracentesis where 4.8 L of fluid was removed before he was transferred to [facility] for IR guided TIPS revision and EGD prior to potentially TIPS widening +/- BRTO if gastric varices identified on EGD. During bedside evaluation, the patient was afebrile, awake, alert, oriented X4, under no acute distress. The patient reported some abdominal discomfort without excruciating pain. There was no scleral icterus or jaundice. Respiratory and cardiovascular examination was within normal limit. Physical Assessment:  General appearance:  alert, cooperative and no distress  Mental Status:  oriented to person, place and time and normal affect  Lungs:  clear to auscultation bilaterally, normal effort  Heart:  regular rate and rhythm, no murmur  Abdomen: Mild tenderness present on palpation. No organomegaly, guarding, rigidity present. No scleral icterus or jaundice present. Extremities:  no edema, redness, tenderness in the calves  Skin:  no gross lesions, rashes, induration    Data Review:    Labs and Imaging:   XR CHEST (SINGLE VIEW FRONTAL)    Result Date: 12/27/2022  1. Minimal bibasilar atelectatic changes with trace left pleural fluid which is slightly decreased from 12/09/2022. CT ABDOMEN PELVIS W IV CONTRAST Additional Contrast? None    Result Date: 12/27/2022  Stable circumferential wall thickening of the distal esophagus and gastroesophageal junction consistent with known esophageal malignancy. No significant change since prior examination. No esophageal obstruction. Cirrhotic liver with associated portal venous hypertension, progressive ascites and stable splenomegaly.   TIPS appears in place and patent Cholelithiasis Interval development of bibasal infiltrates and moderate bilateral pleural effusions as well as mild-to-moderate pericardial effusion IR FLUORO GUIDED NEEDLE PLACEMENT    Result Date: 12/28/2022  Successful ultrasound-guided placement of a right upper extremity midline venous catheter. US LIVER    Result Date: 12/28/2022  1. Cholelithiasis without definite findings of acute cholecystitis. 2. Patent TIPS. Specific velocities recorded above. 3. Small volume abdominal ascites. 4. Hepatic morphologic features typical of cirrhosis. IR US GUIDED PARACENTESIS    Result Date: 12/28/2022  Successful ultrasound-guided therapeutic and diagnostic paracentesis. IR US GUIDED PARACENTESIS    Result Date: 12/23/2022  Successful ultrasound-guided paracentesis.        CBC:  Recent Labs     12/27/22  1734 12/28/22  0601 12/28/22  0910 12/28/22  1432 12/28/22  2356 12/29/22  0544   WBC 3.6  --   --   --  4.4  --    HGB 5.0* 6.2* 5.6* 7.0* 7.0* 7.2*   MCV 78.3*  --   --   --  82.8  --    RDW 18.4*  --   --   --  17.2*  --    PLT 74*  --   --  78* See Reflexed IPF Result  --        ANEMIA STUDIES:  Recent Labs     12/28/22  0601   LABIRON 30   TIBC 233*       BMP:  Recent Labs     12/28/22  0601 12/28/22  2356 12/29/22  0544    130* 132*   K 4.0 3.5* 3.5*    98 100   CO2 22 22 19*   BUN 12 11 11   CREATININE 0.54* 0.65* 0.61*   GLUCOSE 81 86 79   CALCIUM 8.0* 7.9* 7.6*       LFTS:  Recent Labs     12/27/22  1734   ALKPHOS 209*   ALT 16   AST 45*   BILITOT 2.3*   LABALBU 2.3*       Amylase/Lipase and Ammonia:  Recent Labs     12/27/22  1734   LIPASE 18   AMMONIA 41       Acute Hepatitis Panel:  Lab Results   Component Value Date/Time    HEPBSAG NONREACTIVE 12/23/2020 05:09 PM    HEPCAB REACTIVE 12/23/2020 05:09 PM    HEPBIGM NONREACTIVE 12/23/2020 05:09 PM    HEPAIGM NONREACTIVE 08/19/2021 01:29 PM       HCV Genotype:  Lab Results   Component Value Date/Time    HEPATITISCGENOTYPE Mixed 09/06/2016 02:07 PM       HCV Quantitative:  Lab Results   Component Value Date/Time    HCVQNT NOT REPORTED 09/06/2016 02:07 PM       LIVER WORK UP:    AFP  Lab Results   Component Value Date/Time    AFP 2.5 04/13/2022 03:37 PM       Alpha 1 antitrypsin   Lab Results   Component Value Date/Time    A1A 152 08/19/2021 01:29 PM       MARYSOL  Lab Results   Component Value Date/Time    MARYSOL NEGATIVE 07/21/2016 09:29 PM       AMA  Lab Results   Component Value Date/Time    MITOAB 2.6 07/21/2016 09:29 PM       ASMA  No results found for: SMOOTHMUSCAB    PT/INR  Recent Labs     12/28/22  0601 12/29/22  0544   PROTIME 17.0* 13.8*   INR 1.4 1.3       Cancer Markers:  CEA:  No results for input(s): CEA in the last 72 hours. Ca 125:  No results for input(s):  in the last 72 hours. Ca 19-9:   Invalid input(s):   AFP: No results for input(s): AFP in the last 72 hours. Lactic acid:Invalid input(s): LACTIC ACID    Radiology Review:    US LIVER    Result Date: 12/28/2022  EXAMINATION: RIGHT UPPER QUADRANT ULTRASOUND 12/28/2022 4:16 pm COMPARISON: CT abdomen 12/27/2022 HISTORY: ORDERING SYSTEM PROVIDED HISTORY: check patency of TIPS FINDINGS: LIVER:  The liver demonstrates heterogeneous, multinodular pattern echogenicity without dominant hepatic lesion demonstrated sonographically. No evidence of intrahepatic biliary ductal dilatation. TIPS: Duplex and Doppler waveform imaging of the tips was performed with the following flow velocities: Distal 30.6 cm/second, mid 29.1 cm/second, proximal 33.6 cm/second. BILIARY SYSTEM:  Gallbladder demonstrates a number of layering, echogenic shadowing stones. No wall thickening evident. Negative sonographic Silva's sign. Common bile duct is within normal limits measuring 3 mm. RIGHT KIDNEY: The right kidney is grossly unremarkable without evidence of hydronephrosis. Right kidney long dimension 11.2 cm. PANCREAS:  Visualized portions of the pancreas are unremarkable. OTHER: Small volume right upper quadrant ascites. 1.  Cholelithiasis without definite findings of acute cholecystitis. 2. Patent TIPS.   Specific velocities recorded above. 3. Small volume abdominal ascites. 4. Hepatic morphologic features typical of cirrhosis. Active Problems:    Ascites due to alcoholic cirrhosis (HCC)    Shortness of breath    Portal hypertension (HCC)    Esophageal varices with bleeding (HCC)    Other abnormalities of gait and mobility    S/P TIPS (transjugular intrahepatic portosystemic shunt)    Acute blood loss anemia    Near syncope    Bleeding gastric varices    Hepatic encephalopathy    Periumbilical abdominal pain    Decompensated hepatic cirrhosis (HCC)    DDD (degenerative disc disease), lumbar    Acute upper GI hemorrhage    Gastrointestinal hemorrhage with melena  Resolved Problems:    * No resolved hospital problems. *       Assessment and plan:    Decompensated liver cirrhosis s/p TIPS placement with acute upper GI bleed:  -Patient with worsening abdominal ascites s/p paracentesis, melena, and progressive weakness following TIPS placement on December 1, 2022  -Will need TIPS revision to check for patency  -Will possibly need TIPS widening +/- B RTO if gastric varices identified  -Will need repeat echocardiogram if TIPS stent 8/5/2019 showed short load is tolerated  -Patient n.p.o. We will plan for EGD today    Refractory ascites with abdominal discomfort:  -Patient has history of chronic alcoholism and decompensated liver cirrhosis  -Underwent paracentesis with 4.8 L removed  -Hold diuretics for now; continue gentle hydration till patient n.p.o. Hyponatremia:  -Likely due to fluid overload and decompensated cirrhosis  -Management as per primary    Thrombocytopenia:  -Due to decompensated liver cirrhosis  -Management as per primary        This plan was formulated in collaboration with Dr. Destiny Jackson MD    Thank you for allowing me to participate in the care of your patient. Please feel free to contact me with any questions or concerns.      [address] [facility]   Claudine Herrera MD  Internal Medicine Resident, PGY-1  [address], [place]  12/29/2022,8:07 AM      This note was created with the assistance of a speech-recognition program.  Although the intention is to generate a document that actually reflects the content of the visit, no guarantees can be provided that every mistake has been identified and corrected by editing.

## 2022-12-29 NOTE — ANESTHESIA PRE PROCEDURE
Department of Anesthesiology  Preprocedure Note       Name:  Edyta Fraser   Age:  60 y.o.  :  1960                                          MRN:  [id number]         Date:  2022      Surgeon: Ashley Reno):  Dianne Nassar MD    Procedure:     Medications prior to admission:   Prior to Admission medications    Medication Sig Start Date End Date Taking? Authorizing Provider   diphenhydrAMINE (BENADRYL) 25 MG tablet Take 1 tablet by mouth every 6 hours as needed for Itching  Patient not taking: No sig reported 22  Norberto COLE Blood, DO   pantoprazole (PROTONIX) 40 MG tablet Take 40 mg by mouth daily 10/4/22   Historical Provider, MD   FEROSUL 325 (65 Fe) MG tablet take 1 tablet by mouth twice a day 10/7/22   VASU Roman   nadolol (CORGARD) 20 MG tablet take 1 tablet by mouth once daily 22   MASSIMO Jameson NP   midodrine (PROAMATINE) 5 MG tablet Take 2 tablets by mouth in the morning and 2 tablets at noon and 2 tablets before bedtime.   Patient not taking: No sig reported 22   MASSIMO Jameson NP   furosemide (LASIX) 20 MG tablet Take 1 tablet by mouth 2 times daily 22   MASSIMO Jameson NP   spironolactone (ALDACTONE) 100 MG tablet Take 1 tablet by mouth every evening 22   MASSIMO Jameson NP   ondansetron (ZOFRAN-ODT) 4 MG disintegrating tablet dissolve 1 tablet by mouth every 8 hours if needed for nausea and vomiting  Patient not taking: No sig reported 22   VASU Roman   lactulose [facility]) 10 GM/15ML solution take 30 milliliters by mouth three times a day 22   VASU Roman   FLUoxetine (PROZAC) 20 MG capsule Take 1 capsule by mouth daily 22   Cassandra Mazariegos MD   atorvastatin (LIPITOR) 20 MG tablet Take 1 tablet by mouth nightly 22   Cassandra Mazariegos MD       Current medications:    Current Facility-Administered Medications   Medication Dose Route Frequency Provider Last Rate Last Admin    [MAR Hold] piperacillin-tazobactam (ZOSYN) 3,375 mg in dextrose 5 % 50 mL IVPB extended infusion (mini-bag)  3,375 mg IntraVENous Q8H Mary Ramirez MD   Stopped at 12/29/22 0959    [MAR Hold] potassium chloride 10 mEq/100 mL IVPB (Peripheral Line)  10 mEq IntraVENous PRN Lucita Daniel MD       Chasity Draft [facility] Hold] magnesium sulfate 2000 mg in 50 mL IVPB premix  2,000 mg IntraVENous PRN Lucita Daniel MD        [facility] Hold] diphenhydrAMINE (BENADRYL) injection 25 mg  25 mg IntraVENous Q6H PRN Lucita Daniel MD   25 mg at 12/29/22 0938    [MAR Hold] 0.9 % sodium chloride infusion   IntraVENous PRN Lucita Daniel MD        0.9 % sodium chloride infusion   IntraVENous Continuous Marcus Santillan  mL/hr at 12/29/22 1129 New Bag at 12/29/22 1129    [MAR Hold] 0.9 % sodium chloride infusion   IntraVENous Continuous Jennie Treverua, APRN - NP   Stopped at 12/29/22 0559    [MAR Hold] sodium chloride flush 0.9 % injection 5-40 mL  5-40 mL IntraVENous 2 times per day Jennie Makua, APRN - NP   10 mL at 12/29/22 0900    [MAR Hold] sodium chloride flush 0.9 % injection 5-40 mL  5-40 mL IntraVENous PRN Jennie Makua, APRN - NP        [facility] Hold] 0.9 % sodium chloride infusion   IntraVENous PRN Jennie Makua, APRN - NP        [facility] Hold] ondansetron (ZOFRAN-ODT) disintegrating tablet 4 mg  4 mg Oral Q8H PRN Jennie Makua, APRN - NP   4 mg at 12/29/22 0016    Or    [MAR Hold] ondansetron (ZOFRAN) injection 4 mg  4 mg IntraVENous Q6H PRN Jennie Makua, APRN - NP        [facility] Hold] acetaminophen (TYLENOL) tablet 650 mg  650 mg Oral Q6H PRN Jennie Makua, APRN - NP        Or   Ifeoma Francis Hold] acetaminophen (TYLENOL) suppository 650 mg  650 mg Rectal Q6H PRN MASSIMO Weaver NP       Chasity [facility] Hold] octreotide (SANDOSTATIN) 500 mcg in sodium chloride 0.9 % 100 mL infusion  25 mcg/hr IntraVENous Continuous MASSIMO Weaver NP 5 mL/hr at 12/29/22 0616 25 mcg/hr at 12/29/22 0616    [MAR Hold] pantoprazole (PROTONIX) 80 mg in sodium chloride 0.9 % 100 mL infusion  8 mg/hr IntraVENous Continuous Toney MASSIMO Bethea - NP 10 mL/hr at 12/29/22 0616 8 mg/hr at 12/29/22 6007       Allergies:  No Known Allergies    Problem List:    Patient Active Problem List   Diagnosis Code    Back pain, chronic M54.9, G89.29    Hearing difficulty H91.90    GERD (gastroesophageal reflux disease) K21.9    Cervical radicular pain M54.12    Alcohol withdrawal syndrome without complication (HCC) B22.289    Hypomagnesemia E83.42    Chronic viral hepatitis B without delta agent and without coma (HCC) B18.1    Calculus of gallbladder without cholecystitis K80.20    Hep C w/o coma, chronic (HCC) B18.2    Fatty liver K76.0    Psychophysiologic insomnia F51.04    Cirrhosis ([facility] [address] K74.60    Decompensated hepatic cirrhosis (HCC) K72.90, K74.60    Vertebrogenic low back pain M54.51    DDD (degenerative disc disease), lumbar M51.36    Depression F32. A    Tubular adenoma of colon D12.6    History of colon polyps Z86.010    Gynecomastia, male N58    Lumbar radiculitis M54.16    Lumbar disc herniation M51.26    Tinnitus H93.19    Eustachian tube dysfunction H69.80    Ganglion cyst M67.40    Carpal tunnel syndrome of right wrist G56.01    History of hepatitis C Z86.19    Vitamin D deficiency E55.9    Pure hypercholesterolemia E78.00    Hypokalemia E87.6    Essential hypertension I10    Recurrent major depressive disorder in partial remission (HCC) F33.41    S/P epidural steroid injection Z92.241    Elevated LFTs R79.89    Seasonal allergies J30.2    Lumbar facet arthropathy M47.816    Cervical facet syndrome M47.812    Acute upper GI hemorrhage K92.2    Thrombocytopenia (HCC) D69.6    Hepatitis C virus infection resolved after antiviral drug therapy Z86.19    Gastrointestinal hemorrhage with melena K92.1    Alcohol abuse F10.10    Altered mental status R41.82    Hypocalcemia E83.51    Hypophosphatemia E83.39    Malignant neoplasm of lower third of esophagus (HCC) C15.5    COVID-19 U07.1    Anxiety F41.9    Current smoker F17.200    Severe comorbid illness R69    Gait instability R26.81    Abnormal findings on diagnostic imaging of spine R93.7    Cervical spinal stenosis M48.02    Spinal stenosis of lumbar region with neurogenic claudication M48.062    Low hemoglobin D64.9    Symptomatic anemia, microcytic, acute D64.9    Hypotension I95.9    Former smoker, 50+ pack years, quit 2016 Z87.891    HLD (hyperlipidemia) E78.5    Esophageal adenocarcinoma (HCC) C15.9    Anemia D64.9    Acute kidney injury ([address] N17.9    Ascites due to alcoholic cirrhosis (HCC) C36.66    Shortness of breath R06.02    Drop in hemoglobin R71.0    Portal hypertension (HCC) K76.6    Esophageal varices with bleeding (HCC) I85.01    Esophageal polyp K22.81    Acute kidney failure, unspecified (HCC) N17.9    Muscle weakness (generalized) M62.81    Other abnormalities of gait and mobility R26.89    GI bleed K92.2    Goals of care, counseling/discussion Z71.89    ACP (advance care planning) Z71.89    Palliative care encounter Z51.5    S/P TIPS (transjugular intrahepatic portosystemic shunt) Z95.828    Acute blood loss anemia D62    Near syncope R55    Bleeding gastric varices I86.4    Hepatic encephalopathy J90.74    Periumbilical abdominal pain R10.33       Past Medical History:        Diagnosis Date    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 ([address]     Alcoholic cirrhosis of liver with ascites ([address]     Anemia 04/13/2022    Anxiety     Arthritis     Back pain, chronic     dr. Sparkle Soriano, orthopedic, every 3-4 months, gets steroid injection    Lezama esophagus     BPH (benign prostatic hypertrophy)     Cholelithiasis     Cirrhosis ([address]     COVID-19 12/2020    pt reports he had a positive test while at [facility] in 2020, was asymptomatic    COVID-19 vaccine series completed 5/20/2021, 6/22/2021    Vu Houser 5/20/2021, 6/22/2021    DDD (degenerative disc disease), lumbar     Depression     Esophageal cancer (HCC)     INVASIVE ADENOCARCINOMA ARISING IN TUBULAR ADENOMA WITH HIGH GRADE DYSPLASIA, ASSOCIATED WITH FOCAL INTESTINAL METAPLASIA     Esophageal varices (HCC)     Fatty liver     GERD (gastroesophageal reflux disease)     GI bleed     Hep C w/o coma, chronic (HCC)     History of alcohol abuse     6-12 beers a day; quit drinking 2019    History of blood transfusion     History of colon polyps 2016    History of tobacco abuse     [place] (hard of hearing)     Hyperlipidemia     Hypertension     Hyponatremia 07/20/2016    Hypotension 12/20/2021    PONV (postoperative nausea and vomiting)     Port-A-Cath in place     right upper chest    Portal hypertension (HCC)     Sciatica     Secondary esophageal varices (HCC) 06/07/2022    Shortness of breath     Spinal stenosis     Stomach ulcer     hx of    Thrombocytopenia ([facility] [address] 12/23/2020    Tubular adenoma of colon 2016, 2018    Vitamin D deficiency     Wears glasses        Past Surgical History:        Procedure Laterality Date    BUNIONECTOMY      twice on right side    BUNIONECTOMY Left     CARPAL TUNNEL RELEASE Right     COLONOSCOPY      at age 35    COLONOSCOPY  10/05/2016    polyps-pathology tubular adenoma, and abnormal looking mucosa right colon-pathology-tubular adenoma    COLONOSCOPY N/A 03/30/2018    COLONOSCOPY POLYPECTOMY COLD BIOPSY performed by Kristy Cooper MD at [address] [place] [address]  03/30/2018    Small polyp in the sigmoid colon and excised with biopsy forceps--tubular adenoma    COLONOSCOPY N/A 04/16/2022    COLONOSCOPY POLYPECTOMY performed by Kristy Cooper MD at [facility], COLON, DIAGNOSTIC      EGD    IR PORT PLACEMENT EQUAL OR GREATER THAN 5 YEARS  04/19/2021    IR PORT PLACEMENT EQUAL OR GREATER THAN 5 YEARS 4/19/2021 [facility]    IR TIPS INSERTION  12/01/2022    IR TIPS INSERTION 12/1/2022 Reed White MD [facility]    KNEE SURGERY Left     cyst removed    NASAL SEPTUM SURGERY      NERVE BLOCK Right 11/23/2020    NERVE BLOCK RIGHT CERVICAL STEROID INJECTION  C3-C6 performed by Grady Graff MD at [address]  01/04/2016    steroid injection C7 T1    OTHER SURGICAL HISTORY  11/21/2016    Bilateral Lumbar CACHORRO L4-L5 injections    OTHER SURGICAL HISTORY  12/19/2016    lumbar steroid injection    OTHER SURGICAL HISTORY  09/28/2018    BILATERAL L5 CACHORRO (N/A Back)    OTHER SURGICAL HISTORY Right 11/23/2020    cervical injection    PAIN MANAGEMENT PROCEDURE Left 07/09/2020    EPIDURAL STEROID INJECTION LEFT L4 L5 performed by Grady Graff MD at [address] Left 07/20/2020    LEFT L4 L5 EPIDURAL STEROID INJECTION performed by Grady Graff MD at [address] Bilateral 08/17/2020    LUMBAR FACET BILATERAL L2-L5 performed by Grady Graff MD at [address] Bilateral 12/07/2020    NERVE BLOCK BILATERAL LUMBAR MEDIAL BRANCH L2-L5 performed by Grady Graff MD at [address]      Multiple    MD [place] [address] AA&/STRD TFRML EPI LUMBAR/SACRAL 1 LEVEL Bilateral 09/06/2018    BILATERAL L5 CACHORRO performed by Grady Graff MD at [address] AA&/STRD TFRML EPI LUMBAR/SACRAL 1 LEVEL N/A 09/28/2018    BILATERAL L5 CACHORRO performed by Grady Graff MD at [address] [place] [address] N/CARPAL TUNNEL SURG Right 08/29/2017    CARPAL TUNNEL RELEASE RIGHT performed by Marlyn Soriano MD at [address] [place] [address] N/CARPAL TUNNEL SURG Left 10/31/2017    CARPAL TUNNEL RELEASE performed by Marlyn Soriano MD at [address] [place] [address] 12/29/2020    EGD BIOPSY performed by Kiki Rabago MD at [address] [place] [address] 02/02/2021    EGD BIOPSY and spot marking performed by Booker Bailey MD at [address] [place] [address] GASTROINTESTINAL ENDOSCOPY N/A 2021    ENDOSCOPIC ULTRASOUND, EGD performed by Peyton Jovel MD at [place] [address]  2021    EGD DIAGNOSTIC ONLY performed by Peyton Jovel MD at [place] [address] 2021    EGD BIOPSY performed by Paco Royal MD at [address] [place] [address] 2022    EGD BIOPSY performed by Paco Royal MD at [address] [place] [address] N/A 04/15/2022    EGD ESOPHAGOGASTRODUODENOSCOPY performed by Amy Hoskins MD at [address] [place] [address] 2022    EGD BAND LIGATION performed by Chandan Yoder MD at [address] [place] [address] N/A 2022    EGD ESOPHAGOGASTRODUODENOSCOPY performed by Chandan Yoder MD at [address] [place] [address] N/A 2022    EGD ESOPHAGOGASTRODUODENOSCOPY ENDOSCOPIC APC AT [facility] performed by Virginie Robertson MD at [address] [place] [address] 10/19/2022    EGD BIOPSY performed by Paco Royal MD at [address] [place] [address] N/A 10/31/2022    EGD with APC performed by Paco Royal MD at [facility] History:    Social History     Tobacco Use    Smoking status: Former     Packs/day: 1.00     Years: 45.00     Pack years: 45.00     Types: Cigarettes     Quit date: 2017     Years since quittin.9    Smokeless tobacco: Never   Substance Use Topics    Alcohol use: Not Currently     Comment: Quit alcohol in 2019- heavier drinking prior to quitting                                Counseling given: Not Answered      Vital Signs (Current):   Vitals:    22 1045 22 1100 22 1115 22 1124   BP: 128/68 129/68 123/69 126/67   Pulse: 86 89 87 83   Resp: 16 16 16 16   Temp: 98.1 °F (36.7 °C) 98.1 °F (36.7 °C) 97.9 °F (36.6 °C) 97.9 °F (36.6 °C)   TempSrc: Temporal Temporal Temporal Temporal   SpO2: 97% 97% 97% 97%   Weight:       Height:                                                  BP Readings from Last 3 Encounters:   12/29/22 126/67   12/28/22 118/65   12/23/22 119/68       NPO Status: Time of last liquid consumption: 2330                        Time of last solid consumption: 0900                        Date of last liquid consumption: 12/28/22                        Date of last solid food consumption: 12/28/22    BMI:   Wt Readings from Last 3 Encounters:   12/29/22 190 lb (86.2 kg)   12/28/22 197 lb 5 oz (89.5 kg)   12/16/22 214 lb 15.2 oz (97.5 kg)     Body mass index is 27.26 kg/m². CBC:   Lab Results   Component Value Date/Time    WBC 4.4 12/28/2022 11:56 PM    RBC 2.79 12/28/2022 11:56 PM    RBC 4.75 04/19/2012 11:30 AM    HGB 7.2 12/29/2022 05:44 AM    HCT 24.5 12/29/2022 05:44 AM    MCV 82.8 12/28/2022 11:56 PM    RDW 17.2 12/28/2022 11:56 PM    PLT See Reflexed IPF Result 12/28/2022 11:56 PM     04/19/2012 11:30 AM       CMP:   Lab Results   Component Value Date/Time     12/29/2022 05:44 AM    K 3.5 12/29/2022 05:44 AM     12/29/2022 05:44 AM    CO2 19 12/29/2022 05:44 AM    BUN 11 12/29/2022 05:44 AM    CREATININE 0.61 12/29/2022 05:44 AM    GFRAA >60 09/28/2022 01:33 PM    LABGLOM >60 12/29/2022 05:44 AM    GLUCOSE 79 12/29/2022 05:44 AM    GLUCOSE 65 04/19/2012 11:30 AM    PROT 4.5 12/27/2022 05:34 PM    CALCIUM 7.6 12/29/2022 05:44 AM    BILITOT 2.3 12/27/2022 05:34 PM    ALKPHOS 209 12/27/2022 05:34 PM    AST 45 12/27/2022 05:34 PM    ALT 16 12/27/2022 05:34 PM       POC Tests: No results for input(s): POCGLU, POCNA, POCK, POCCL, POCBUN, POCHEMO, POCHCT in the last 72 hours.     Coags:   Lab Results   Component Value Date/Time    PROTIME 13.8 12/29/2022 05:44 AM    INR 1.3 12/29/2022 05:44 AM    APTT 29.0 12/29/2022 05:44 AM       HCG (If Applicable): No results found for: PREGTESTUR, PREGSERUM, HCG, HCGQUANT     ABGs: No results found for: PHART, PO2ART, QEA8MXO, BVN5WWT, BEART, N9IIGPSH     Type & Screen (If Applicable):  No results found for: LABABO, LABRH    Drug/Infectious Status (If Applicable):  Lab Results   Component Value Date/Time    HEPCAB REACTIVE 12/23/2020 05:09 PM       COVID-19 Screening (If Applicable):   Lab Results   Component Value Date/Time    COVID19 Not Detected 09/12/2022 10:32 PM    COVID19 Not Detected 07/17/2020 10:00 AM           Anesthesia Evaluation  Patient summary reviewed no history of anesthetic complications:   Airway: Mallampati: II  TM distance: >3 FB   Neck ROM: full  Mouth opening: > = 3 FB   Dental:          Pulmonary:normal exam    (+) shortness of breath: no interval change,                             Cardiovascular:    (+) hypertension: no interval change,       ECG reviewed  Rhythm: regular  Rate: normal                    Neuro/Psych:   (+) neuromuscular disease:, psychiatric history:depression/anxiety             GI/Hepatic/Renal:   (+) GERD: no interval change, PUD, hepatitis: C, liver disease: portal hypertension,           Endo/Other: Negative Endo/Other ROS   (+) blood dyscrasia: anemia:., .                 Abdominal:             Vascular: negative vascular ROS. Other Findings:           Anesthesia Plan      MAC     ASA 3       Induction: intravenous. Anesthetic plan and risks discussed with patient. Plan discussed with CRNA.                     Dorcas Santos MD   12/29/2022

## 2022-12-29 NOTE — PLAN OF CARE
Problem: Discharge Planning  Goal: Discharge to home or other facility with appropriate resources  Outcome: Progressing     Problem: Pain  Goal: Verbalizes/displays adequate comfort level or baseline comfort level  Outcome: Progressing     Problem: Respiratory - Adult  Goal: Achieves optimal ventilation and oxygenation  Outcome: Progressing     Problem: Cardiovascular - Adult  Goal: Maintains optimal cardiac output and hemodynamic stability  Outcome: Progressing  Goal: Absence of cardiac dysrhythmias or at baseline  Outcome: Progressing     Problem: Musculoskeletal - Adult  Goal: Return mobility to safest level of function  Outcome: Progressing  Goal: Maintain proper alignment of affected body part  Outcome: Progressing  Goal: Return ADL status to a safe level of function  Outcome: Progressing     Problem: Gastrointestinal - Adult  Goal: Minimal or absence of nausea and vomiting  Outcome: Progressing  Goal: Maintains or returns to baseline bowel function  Outcome: Progressing     Problem: Hematologic - Adult  Goal: Maintains hematologic stability  Outcome: Progressing     Problem: Skin/Tissue Integrity  Goal: Absence of new skin breakdown  Description: 1. Monitor for areas of redness and/or skin breakdown  2. Assess vascular access sites hourly  3. Every 4-6 hours minimum:  Change oxygen saturation probe site  4. Every 4-6 hours:  If on nasal continuous positive airway pressure, respiratory therapy assess nares and determine need for appliance change or resting period.   Outcome: Progressing

## 2022-12-29 NOTE — CONSENT
Informed Consent for Blood Component Transfusion Note    I have discussed with the patient the rationale for blood component transfusion; its benefits in treating or preventing fatigue, organ damage, or death; and its risk which includes mild transfusion reactions, rare risk of blood borne infection, or more serious but rare reactions. I have discussed the alternatives to transfusion, including the risk and consequences of not receiving transfusion. The patient had an opportunity to ask questions and had agreed to proceed with transfusion of blood components.     Electronically signed by Cassie López MD on 12/29/22 at 4:05 AM EST

## 2022-12-29 NOTE — CARE COORDINATION
Report called to Pelon Oro at [facility]. All current medications and infusions reviewed. Questions answered.

## 2022-12-29 NOTE — H&P
[facility]  Office: [address], DO, [place] , DO, Jazmin Sender, DO, Josué December Blood, DO, Katja Saleem MD, Galo Gill MD, Betzy Álvarez MD, Evelin Francisco MD,  Juve Wade MD, Ida Maki MD, Ana Parks, DO, Melene Boast, MD,  Rosana Nieves MD, Mallorie Weeks MD, Juan Diego Parkinson DO, Oneyda Salgado MD, Shazia Melendez MD, Milana Yu DO, Félix Trevino MD, Zina Biggs MD, Jack Wilson MD, Rudolph Perry MD, Fernand Dance, DO, Corbin Sorenson MD, Jewel Palm MD, Fracisco Hendrix, CNP,  Miky Beatty, CNP, Juan Diego Sheriff, CNP, Gabriela Virk, CNP,  Bam Pena, [facility], Charles Crawford, CNP, Fidel Matthew, CNP, Douglas Rios, CNP, Ishan Romo, [facility], [facility], [place], CNP, Jori Javed PA-C, Kathryn Narvaez, CNS, Stephan Marley, CNP, Jose Guadalupe Martinez, [facility]    HISTORY AND PHYSICAL EXAMINATION            Date:   12/29/2022  Patient name:  Cheryle Copp  Date of admission:  12/28/2022 11:30 PM  MRN:   [id number]  Account:  [de-identified]  YOB: 1960  PCP:    VASU Callahan  Room:   7188/0768-24  Code Status:    Prior    Chief Complaint:     \" Low hemoglobin\"    History Obtained From:     patient    History of Present Illness:     Cheryle Copp is a 60 y.o. 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) status post chemo and radiation(completed rx 06/2021, PET negative active neoplasm 07/2021) decompensated cirrhosis with EtOH (hepatitis C status post treated), with AVMs status post prior APC , portal gastropathy, gastric and esophageal varices status post prior banding/cautery with history of recurrent GI bleed/refractory ascites status post TIPS 12/01/2022, abdominal ascites receiving weekly therapeutic paracentesis, who presents with symptomatic anemia, recurrent UGI bleed with melena/hemorrhage and is admitted to the hospital for the management of upper GI bleed/hemorrhage with acute blood loss anemia with melena/ portal hypertension, gastric and esophageal varix    Patient describes persistent abdominal pain since recent TIPS 12/01 with recurrent GI bleed, recurrent ascites. Patient describes dull aching abdominal pain mid abdomen, no radiation, symptoms wax and wane, moderate to severe.  + Nausea.  + Dark black melanotic stools with 1-2 bowel movements daily over the past 3 to 4 days. + Easy bruising.   + Increased fatigue, dyspnea with minimal activity, increasing dizziness with near syncope prior to arrival with tachycardia, palpitations . denies fevers or chills. Denies hematemesis. Denies ongoing alcohol (quit 1 to 2 months ago). Denies chest pain, denies indigestion. Last bowel movement yesterday afternoon. Denies further bloody bowel movements since 12/28 afternoon. Patient initially evaluated at [facility].  initial labs demonstrate critical hemoglobin 5. Status post therapeutic paracentesis with 4.5 L removed 12/28 with improved abdominal pain. Status post 3 units of PRBCs. Status post IV albumin. Started on octreotide and Protonix drip. GI did see and evaluate patient at [facility] and discussed with IR with plans to transfer to [facility]. Mal's for reevaluation of TIPS with consideration for possible widening +/- BRTO. CT abdomen pelvis +circumferential wall thickening of the distal esophagus, GE junction. TIPS appears patent. Portal hypertension with perigastric and esophageal varix noted.  + Calcified thrombosed splenic artery aneurysm stable. Status post stat liver ultrasound with patent TIPS.        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 Hong Castro, orthopedic, every 3-4 months, gets steroid injection    Lezama esophagus     BPH (benign prostatic hypertrophy)     Cholelithiasis     Cirrhosis ([address]     COVID-19 12/2020    pt reports he had a positive test while at [facility] in 2020, was asymptomatic    COVID-19 vaccine series completed 5/20/2021, 6/22/2021    Moderna 5/20/2021, 6/22/2021    DDD (degenerative disc disease), lumbar     Depression     Esophageal cancer ([address]     INVASIVE ADENOCARCINOMA ARISING IN TUBULAR ADENOMA WITH HIGH GRADE DYSPLASIA, ASSOCIATED WITH FOCAL INTESTINAL METAPLASIA     Esophageal varices ([address]     Fatty liver     GERD (gastroesophageal reflux disease)     GI bleed     Hep C w/o coma, chronic ([address]     History of alcohol abuse     6-12 beers a day; quit drinking 2019    History of blood transfusion     History of colon polyps 2016    History of tobacco abuse     [place] (hard of hearing)     Hyperlipidemia     Hypertension     Hyponatremia 07/20/2016    Hypotension 12/20/2021    PONV (postoperative nausea and vomiting)     Port-A-Cath in place     right upper chest    Portal hypertension ([address]     Sciatica     Secondary esophageal varices ([address] 06/07/2022    Shortness of breath     Spinal stenosis     Stomach ulcer     hx of    Thrombocytopenia ([address] 12/23/2020    Tubular adenoma of colon 2016, 2018    Vitamin D deficiency     Wears glasses         Past Surgical History:     Past Surgical History:   Procedure Laterality Date    BUNIONECTOMY      twice on right side    BUNIONECTOMY Left     CARPAL TUNNEL RELEASE Right     COLONOSCOPY      at age 35    COLONOSCOPY  10/05/2016    polyps-pathology tubular adenoma, and abnormal looking mucosa right colon-pathology-tubular adenoma    COLONOSCOPY N/A 03/30/2018    COLONOSCOPY POLYPECTOMY COLD BIOPSY performed by Sony Rose MD at [address] [address]  03/30/2018    Small polyp in the sigmoid colon and excised with biopsy forceps--tubular adenoma    COLONOSCOPY N/A 04/16/2022    COLONOSCOPY POLYPECTOMY performed by Sean Baker MD at [facility] COLON, DIAGNOSTIC      EGD    IR PORT PLACEMENT EQUAL OR GREATER THAN 5 YEARS  04/19/2021    IR PORT PLACEMENT EQUAL OR GREATER THAN 5 YEARS 4/19/2021 [facility]    IR TIPS INSERTION  12/01/2022    IR TIPS INSERTION 12/1/2022 Wenda Habermann, MD [facility]    KNEE SURGERY Left     cyst removed    NASAL SEPTUM SURGERY      NERVE BLOCK Right 11/23/2020    NERVE BLOCK RIGHT CERVICAL STEROID INJECTION  C3-C6 performed by Rodrigo Lundberg MD at [facility]  01/04/2016    steroid injection C7 T1    OTHER SURGICAL HISTORY  11/21/2016    Bilateral Lumbar CACHORRO L4-L5 injections    OTHER SURGICAL HISTORY  12/19/2016    lumbar steroid injection    OTHER SURGICAL HISTORY  09/28/2018    BILATERAL L5 CACHORRO (N/A Back)    OTHER SURGICAL HISTORY Right 11/23/2020    cervical injection    PAIN MANAGEMENT PROCEDURE Left 07/09/2020    EPIDURAL STEROID INJECTION LEFT L4 L5 performed by Rodrigo Lundberg MD at [facility] Left 07/20/2020    LEFT L4 L5 EPIDURAL STEROID INJECTION performed by Rodrigo Lundberg MD at [facility] Bilateral 08/17/2020    LUMBAR FACET BILATERAL L2-L5 performed by Rodrigo Lundberg MD at [facility] Bilateral 12/07/2020    NERVE BLOCK BILATERAL LUMBAR MEDIAL BRANCH L2-L5 performed by Rodrigo Lundberg MD at [address] [place] [address] [place] AA&/STRD TFRML EPI LUMBAR/SACRAL 1 LEVEL Bilateral 09/06/2018    BILATERAL L5 CACHORRO performed by Rodrigo Lundberg MD at [facility] AA&/STRD TFRML EPI LUMBAR/SACRAL 1 LEVEL N/A 09/28/2018    BILATERAL L5 CACHORRO performed by Rodrigo Lundberg MD at [address] [place] [address] N/CARPAL TUNNEL SURG Right 08/29/2017    CARPAL TUNNEL RELEASE RIGHT performed by María Elena Vivas MD at [address] [place] [address] N/CARPAL TUNNEL SURG Left 10/31/2017    CARPAL TUNNEL RELEASE performed by María Elena Vivas MD at [facility] OR    UPPER GASTROINTESTINAL ENDOSCOPY N/A 12/29/2020    EGD BIOPSY performed by Tamar Perdomo MD at [address] [place] [address] 02/02/2021    EGD BIOPSY and spot marking performed by Deacon Plata MD at [address] [place] [address] 02/12/2021    ENDOSCOPIC ULTRASOUND, EGD performed by Laure Salvador MD at [address] [place] [address]  02/12/2021    EGD DIAGNOSTIC ONLY performed by Laure Salvador MD at [address] [place] [address] 08/31/2021    EGD BIOPSY performed by Deacon Plata MD at [address] [place] [address] 01/21/2022    EGD BIOPSY performed by Deacon Plata MD at [address] [place] [address] N/A 04/15/2022    EGD ESOPHAGOGASTRODUODENOSCOPY performed by Tamar Perdomo MD at [place] [address] 06/06/2022    EGD BAND LIGATION performed by Shaina Torres MD at [address] [place] [address] N/A 06/09/2022    EGD ESOPHAGOGASTRODUODENOSCOPY performed by Shaina Torres MD at [address] [place] [address] N/A 09/14/2022    EGD ESOPHAGOGASTRODUODENOSCOPY ENDOSCOPIC APC AT GE JUNCTION performed by Daquan Eng MD at [address] [place] [address] 10/19/2022    EGD BIOPSY performed by Deacon Plata MD at [address] [place] [address] 10/31/2022    EGD with APC performed by Deacon Plata MD at [address] [place] [address]          Medications Prior to Admission:     Prior to Admission medications    Medication Sig Start Date End Date Taking?  Authorizing Provider   diphenhydrAMINE (BENADRYL) 25 MG tablet Take 1 tablet by mouth every 6 hours as needed for Itching  Patient not taking: No sig reported 12/2/22 1/1/23  Samy Snooks P Blood, DO   pantoprazole (PROTONIX) 40 MG tablet Take 40 mg by mouth daily 10/4/22   Historical Provider, MD   FEROSUL 325 (72 Fe) MG tablet take 1 tablet by mouth twice a day 10/7/22   VASU Galvez   nadolol (CORGARD) 20 MG tablet take 1 tablet by mouth once daily 8/26/22   MASSIMO Rudd NP   midodrine (PROAMATINE) 5 MG tablet Take 2 tablets by mouth in the morning and 2 tablets at noon and 2 tablets before bedtime. Patient not taking: No sig reported 8/4/22   MASSIMO Rudd NP   furosemide (LASIX) 20 MG tablet Take 1 tablet by mouth 2 times daily 6/23/22   MASSIMO Rudd NP   spironolactone (ALDACTONE) 100 MG tablet Take 1 tablet by mouth every evening 6/23/22   MASSIMO Rudd NP   ondansetron (ZOFRAN-ODT) 4 MG disintegrating tablet dissolve 1 tablet by mouth every 8 hours if needed for nausea and vomiting  Patient not taking: No sig reported 5/31/22   VASU Galvez   lactulose [facility]) 10 GM/15ML solution take 30 milliliters by mouth three times a day 5/31/22   VASU Galvez   FLUoxetine (PROZAC) 20 MG capsule Take 1 capsule by mouth daily 4/21/22   Cassius Hameed MD   atorvastatin (LIPITOR) 20 MG tablet Take 1 tablet by mouth nightly 4/21/22   Cassius Hameed MD        Allergies:     Patient has no known allergies. Social History:     Tobacco:    reports that he quit smoking about 5 years ago. His smoking use included cigarettes. He has a 45.00 pack-year smoking history. He has never used smokeless tobacco.  Alcohol:      reports that he does not currently use alcohol. Drug Use:  reports that he does not currently use drugs after having used the following drugs: Cocaine. Frequency: 1.00 time per week. Quit tobacco 7 years ago, quit alcohol 1 to 2 months ago, vague regarding stop date, denies ongoing use, history of recent falls with gait and balance, status post fall on Monday. Denies sick contacts.   Does have remote history of cocaine and marijuana use, denies any ongoing /recent use    Family History:     Family History   Problem Relation Age of Onset Cancer Mother         pancreatic    Cancer Father         bone    Diabetes Sister     Asthma Brother     Allergies Brother         MULTIPLE   Denies family history of liver disease, no history of bleeding issues, no history of DVT PE    Review of Systems:     Positive and Negative as described in HPI. Review of Systems  Denies prior history of MI diabetes CVA sleep apnea.  + Hard of hearing. Denies use of NSAIDs aspirin or other blood thinners. Denies history of CHF. Denies dysphagia/odynophagia. Describes easy bruising and bleeding issues which is chronic. Gait and balance with intermittent history of frequent falls most recently on Monday.  + Lightheadedness \"if I stand up too quickly with near syncope on occasion .  + Orthostasis acutely worsening over the past 2 to 3 days . denies LOC. Denies prodrome of chest pain or shortness of breath prior to fall. Denies fevers chills URI symptoms. Denies any flulike symptoms. Does take lactulose daily for hepatic encephalopathy prophylaxis with history of prior confusion, however supposed to take it 3 times daily. Continues to take diuretics as prescribed. Denies other noncompliance with medications. Denies history of SBP. Denies recent antibiotics. Denies urinary symptoms, no dysuria hematuria frequency urgency. Does have chronic temporal headaches, denies history of migraines. Does have chronic lower extremity edema without any acute changes. Denies prior DVT PE. Denies myalgias or arthralgias, denies lumbago despite history of DJD with prior sciatica, denies any acute radicular symptoms despite recent fall. Patient does take daily iron supplements .   review of systems otherwise -12 system review and otherwise unremarkable    Physical Exam:   /62   Pulse 99   Temp 98.9 °F (37.2 °C) (Temporal)   Resp 21   Ht 5' 10\" (1.778 m)   Wt 190 lb 11.2 oz (86.5 kg)   SpO2 98%   BMI 27.36 kg/m²   Temp (24hrs), Av.5 °F (36.9 °C), Min:97.9 °F (36.6 °C), Max:98.9 °F (37.2 °C)    No results for input(s): POCGLU in the last 72 hours. No intake or output data in the 24 hours ending 12/29/22 0309    Physical Exam  General sitting up in bed, pleasant appropriate, oriented x3, follows commands, hard of hearing awake and alert, forgetful  Eye exam pupils equally round reactive sclera mildly icteric normal horizontal gaze  ENT moist mucous membranes face symmetric neck supple adequate dentition  Cardiac regular rhythm, tachycardic, normal S1-S2 Nemmers rubs or gallops, no JVD  Lungs slightly diminished at bases with mild resting tachypnea nonlabored no accessory muscle use no wheezing or rhonchi  GI soft with moderate distention, ascites present with fluctuating dullness, diffuse tenderness in periumbilical area, no rebound or guarding, no peritonitis bowel sounds present  Vascular chronic trophic changes with edema in lower feet and ankles, intact dorsalis pedis posterior tibialis  Derm adequate foot hygiene, stigmata of liver disease with spider angiomas, telangiectasias.   Superficial ecchymosis over upper arms, no other rashes or lesions  Neuro no asterixis, strength symmetric in upper and lower limbs without evidence of drift, sensation grossly intact  Musculoskeletal passive range of motion of upper and lower limbs unremarkable no synovitis or joint effusions    Investigations:      Laboratory Testing:  Recent Results (from the past 24 hour(s))   Protime-INR    Collection Time: 12/28/22  6:01 AM   Result Value Ref Range    Protime 17.0 (H) 11.8 - 14.6 sec    INR 1.4    APTT    Collection Time: 12/28/22  6:01 AM   Result Value Ref Range    PTT 30.3 24.0 - 36.0 sec   Iron and TIBC    Collection Time: 12/28/22  6:01 AM   Result Value Ref Range    Iron 69 59 - 158 ug/dL    TIBC 233 (L) 250 - 450 ug/dL    Iron Saturation 30 20 - 55 %    UIBC 164 112 - 347 ug/dL   Basic Metabolic Panel w/ Reflex to MG    Collection Time: 12/28/22  6:01 AM   Result Value Ref Range Glucose 81 70 - 99 mg/dL    BUN 12 8 - 23 mg/dL    Creatinine 0.54 (L) 0.70 - 1.20 mg/dL    Est, Glom Filt Rate >60 >60 mL/min/1.73m2    Calcium 8.0 (L) 8.6 - 10.4 mg/dL    Sodium 135 135 - 144 mmol/L    Potassium 4.0 3.7 - 5.3 mmol/L    Chloride 102 98 - 107 mmol/L    CO2 22 20 - 31 mmol/L    Anion Gap 11 9 - 17 mmol/L   Hemoglobin and Hematocrit    Collection Time: 12/28/22  6:01 AM   Result Value Ref Range    Hemoglobin 6.2 (LL) 13.5 - 17.5 g/dL    Hematocrit 20.5 (L) 41 - 53 %   Hemoglobin and Hematocrit    Collection Time: 12/28/22  9:10 AM   Result Value Ref Range    Hemoglobin 5.6 (LL) 13.5 - 17.5 g/dL    Hematocrit 18.1 (L) 41 - 53 %   Platelet Count    Collection Time: 12/28/22  2:32 PM   Result Value Ref Range    Platelets 78 (L) 547 - 450 k/uL   Hemoglobin and Hematocrit    Collection Time: 12/28/22  2:32 PM   Result Value Ref Range    Hemoglobin 7.0 (LL) 13.5 - 17.5 g/dL    Hematocrit 22.2 (L) 41 - 53 %   Body fluid cell count with differential    Collection Time: 12/28/22  3:44 PM   Result Value Ref Range    Color, Fluid Yellow     Appearance, Fluid Cloudy     WBC, Fluid 98 /mm3    RBC, Fluid 289 /mm3    Specimen Type . PARACENTESIS FLUID     Fluid Diff Comment PENDING     Neutrophil Count, Fluid 3 (H) 0 %    Lymphocytes, Body Fluid 8 (H) 0 %    Monocyte Count, Fluid      0 %    Eos, Fluid      0 %    Basos, Fluid      0 %    Other Cells, Fluid 89 (H) 0 %   Protein, body fluid    Collection Time: 12/28/22  3:44 PM   Result Value Ref Range    Specimen Type . PARACENTESIS FLUID     Total Protein, Body Fluid <1.0 g/dL   Albumin, fluid    Collection Time: 12/28/22  3:44 PM   Result Value Ref Range    Specimen Type . PARACENTESIS FLUID     Albumin, Fluid 0.4 g/dL   Culture, Body Fluid    Collection Time: 12/28/22  3:45 PM    Specimen: Ascitic Fluid; Body Fluid   Result Value Ref Range    Specimen Description . ASCITIC FLUID     Direct Exam MODERATE NEUTROPHILS (A)     Direct Exam NO ORGANISMS SEEN     Direct Exam       Gram stain made from cytocentrifuged specimen. Organisms and cells will be concentrated. Culture PENDING    CBC with Auto Differential    Collection Time: 12/28/22 11:56 PM   Result Value Ref Range    WBC 4.4 3.5 - 11.3 k/uL    RBC 2.79 (L) 4.21 - 5.77 m/uL    Hemoglobin 7.0 (LL) 13.0 - 17.0 g/dL    Hematocrit 23.1 (L) 40.7 - 50.3 %    MCV 82.8 82.6 - 102.9 fL    MCH 25.1 (L) 25.2 - 33.5 pg    MCHC 30.3 28.4 - 34.8 g/dL    RDW 17.2 (H) 11.8 - 14.4 %    Platelets See Reflexed IPF Result 138 - 453 k/uL    NRBC Automated 0.5 (H) 0.0 per 100 WBC    Immature Granulocytes 1 (H) 0 %    Seg Neutrophils 74 (H) 36 - 65 %    Lymphocytes 7 (L) 24 - 43 %    Monocytes 17 (H) 3 - 12 %    Eosinophils % 1 1 - 4 %    Basophils 0 0 - 2 %    Absolute Immature Granulocyte 0.04 0.00 - 0.30 k/uL    Segs Absolute 3.26 1.50 - 8.10 k/uL    Absolute Lymph # 0.31 (L) 1.10 - 3.70 k/uL    Absolute Mono # 0.75 0.10 - 1.20 k/uL    Absolute Eos # 0.04 0.00 - 0.44 k/uL    Basophils Absolute 0.00 0.00 - 0.20 k/uL    Morphology ANISOCYTOSIS PRESENT    Basic Metabolic Panel w/ Reflex to MG    Collection Time: 12/28/22 11:56 PM   Result Value Ref Range    Glucose 86 70 - 99 mg/dL    BUN 11 8 - 23 mg/dL    Creatinine 0.65 (L) 0.70 - 1.20 mg/dL    Est, Glom Filt Rate >60 >60 mL/min/1.73m2    Calcium 7.9 (L) 8.6 - 10.4 mg/dL    Sodium 130 (L) 135 - 144 mmol/L    Potassium 3.5 (L) 3.7 - 5.3 mmol/L    Chloride 98 98 - 107 mmol/L    CO2 22 20 - 31 mmol/L    Anion Gap 10 9 - 17 mmol/L   TYPE AND SCREEN    Collection Time: 12/28/22 11:56 PM   Result Value Ref Range    Expiration Date 12/31/2022,2359     Arm Band Number [id number]     ABO/Rh AB POSITIVE     Antibody Screen NEGATIVE     A1 Lectin NEGATIVE    Immature Platelet Fraction    Collection Time: 12/28/22 11:56 PM   Result Value Ref Range    Platelet, Fluorescence Platelet clumps present, count appears decreased.  138 - 453 k/uL   Magnesium    Collection Time: 12/28/22 11:56 PM   Result Value Ref Range    Magnesium 1.6 1.6 - 2.6 mg/dL       Imaging/Diagnostics:  XR CHEST (SINGLE VIEW FRONTAL)    Result Date: 12/27/2022  1. Minimal bibasilar atelectatic changes with trace left pleural fluid which is slightly decreased from 12/09/2022. CT ABDOMEN PELVIS W IV CONTRAST Additional Contrast? None    Result Date: 12/27/2022  Stable circumferential wall thickening of the distal esophagus and gastroesophageal junction consistent with known esophageal malignancy. No significant change since prior examination. No esophageal obstruction. Cirrhotic liver with associated portal venous hypertension, progressive ascites and stable splenomegaly. TIPS appears in place and patent Cholelithiasis Interval development of bibasal infiltrates and moderate bilateral pleural effusions as well as mild-to-moderate pericardial effusion     IR FLUORO GUIDED NEEDLE PLACEMENT    Result Date: 12/28/2022  Successful ultrasound-guided placement of a right upper extremity midline venous catheter. US LIVER    Result Date: 12/28/2022  1. Cholelithiasis without definite findings of acute cholecystitis. 2. Patent TIPS. Specific velocities recorded above. 3. Small volume abdominal ascites. 4. Hepatic morphologic features typical of cirrhosis. IR US GUIDED PARACENTESIS    Result Date: 12/28/2022  Successful ultrasound-guided therapeutic and diagnostic paracentesis. IR US GUIDED PARACENTESIS    Result Date: 12/23/2022  Successful ultrasound-guided paracentesis. Egd 02/02/2021  Final Diagnosis   ESOPHAGUS, LESION AT 34 CM, BIOPSY:          INVASIVE ADENOCARCINOMA ARISING IN TUBULAR ADENOMA WITH HIGH   GRADE DYSPLASIA, ASSOCIATED WITH FOCAL INTESTINAL METAPLASIA (SEE   COMMENT)     Diagnosis Comment   The malignancy diagnosis is confirmed by review of a second   pathologist.  The result of HER2 IHC with reflex to [address] [place] [address] for   gastroesophageal adenocarcinoma will be issued in an addendum.       Egd 08/2021  -- Diagnosis -- GASTROESOPHAGEAL JUNCTION, BIOPSIES:- GASTRIC MUCOSA WITH INTESTINAL   (GOBLET CELL) METAPLASIA,    CONSISTENT WITH JIMENES'S METAPLASIA, WITH FOCAL HIGH-GRADE   DYSPLASIA. - NEGATIVE FOR MALIGNANCY. Egd 01/2021  No evidence of residual cancer. No recurrence    Echo 12/2021  CONCLUSIONS     Summary  Left ventricle is normal in size. Mild left ventricular hypertrophy. Global left ventricular systolic function is normal. Estimated LV EF 60-65  %. Left atrium is mildly dilated. No significant valvular regurgitation or stenosis seen. No significant pericardial effusion is seen. Normal aortic root dimension. Assessment :      Hospital Problems             Last Modified POA    Acute upper GI hemorrhage 12/29/2022 Yes    Esophageal varices with bleeding ([address] 12/29/2022 Yes    S/P TIPS (transjugular intrahepatic portosystemic shunt) 12/29/2022 Yes    Acute blood loss anemia 12/29/2022 Yes    Bleeding gastric varices 12/29/2022 Yes    Ascites due to alcoholic cirrhosis ([address] 87/43/7360 Yes    Shortness of breath 12/29/2022 Yes    Portal hypertension ([address] 12/29/2022 Yes    Other abnormalities of gait and mobility 12/29/2022 Yes    Near syncope 12/29/2022 Yes    Hepatic encephalopathy 15/72/5889 Yes    Periumbilical abdominal pain 12/29/2022 Yes    Decompensated hepatic cirrhosis ([address] 12/29/2022 Yes    DDD (degenerative disc disease), lumbar 12/29/2022 Yes    Gastrointestinal hemorrhage with melena 12/29/2022 Yes       Plan:     Patient status inpatient in the Progressive Unit/Step down    Acute upper GI bleed with melena/acute blood loss anemia- status post preliminary GI work-up at [facility] with suspected gastric/esophageal varix bleed with known portal gastropathy status post recent TIPS versus possible bleeding AVM status post prior APC versus related to peptic ulcer disease/recurrence of malignancy with known history of esophageal adeno CA/Jimenes's 2021.   Plan for repeat EGD to reevaluate with consideration for IR evaluation for widening TIPS +/- BRTO. Remains on octreotide/Protonix drip. Continue transfusional support to maintain hemoglobin>7 (s/p 3 units PRBCs)  Acute on chronic abdominal pain, likely multifactorial with abdominal ascites, status post therapeutic paracentesis. Pending EGD to reassess possible peptic ulcer disease. Continue other supportive care. Start prophylactic Zosyn with concern for SBP after recent large-volume paracentesis, acute GI bleed. discussed with patient limitation and avoidance of opiates with history of hepatic encephalopathy  Refractory ascites/pleural effusion status post prior TIPS with ongoing fluid accumulation. Encourage fluid restriction, salt restriction once resuming diet. Hold diuretics for now, continue gentle IV fluids while n.p.o./acutely bleeding  Decompensated cirrhosis with history hepatitis C, status posttreatment/alcohol abuse. Sober1-2 months. Chronic Lezama's/GERD with history of adenocarcinoma of esophagus status post chemo XRT 2021. Recent EGD without evidence of recurrence. CT imaging unfortunately does demonstrate thickening of distal esophagus which is concerning. Pending repeat evaluation by GI. Remains on PPI drip as above  Near syncope with collapse with history of frequent falls/orthostasis. Check orthostatic blood pressures. Continue volume resuscitation with transfusions, IV fluids. Continue fall precautions. Consult therapy once clinically stabilized  Acute on chronic pain syndrome-limit opiates if possible with history of hepatic encephalopathy. Discussed with patient increased risk of confusion with opiate use/poor metabolism with liver disease  Chronic lumbago/DJD. Supportive therapy  Chronic hepatic encephalopathy as above avoid opiates as able. Continue lactulose. Chronic dyspnea on exertion likely related to recurrent fluid overload with pleural effusions/ascites/symptomatic anemia.   Continue supportive care status post TIPS  Acute on chronic hyponatremia likely multifactorial with fluid overload, decompensated cirrhosis  Thrombocytopenia, chronic, transfuse platelets <94- 50 with concern for bleed  Coagulopathy associate with liver disease. INR 1.4, PTT 30, PT 17. Continue to monitor with consideration for FFP if acutely worsens. Suspect likely multifactorial with reduced nutrition, cirrhosis with poor synthetic function    Guarded prognosis with concern for recurrent upper GI bleed /varix bleed status post recent TIPS. Questions and concerns and treatment plan discussed with patient. Recommendation for transfusions to maintain hemoglobin greater than 7, risks of blood products discussed with patient. Patient agreeable. Discussed with nursing. Labs in chart reviewed independently by myself approximately 15 minutes. Anticipate likely discharge in 3 to 5 days contingent on clinical progress    Consultations:   IP CONSULT TO GI  IP CONSULT TO [address] [place] [address]    Patient is admitted as inpatient status because of co-morbidities listed above, severity of signs and symptoms as outlined, requirement for current medical therapies and most importantly because of direct risk to patient if care not provided in a hospital setting. Expected length of stay > 48 hours.     Bianka Sue MD  12/29/2022  3:09 AM    Copy sent to VASU Pisano

## 2022-12-29 NOTE — OP NOTE
EGD      Patient:   Yan Griffin    :    [id number]    Facility:   [facility]   Referring/PCP: Alvarez Kay    Procedure:   Esophagogastroduodenoscopy --diagnostic  Date:     2022   Endoscopist:  Rama Basilio MD, [facility]    Indication:   Melena, anemia of acute blood loss    Postprocedure diagnosis:   Large esophageal and gastric varices, portal hypertensive gastropathy    Anesthesia:  MAC    Complications: None    EBL: None from the procedure    Specimen: None    Description of Procedure:  Informed consent was obtained from the patient after explanation of the procedure including indications, description of the procedure,  benefits and possible risks and complications of the procedure, and alternatives. Questions were answered. The patient's history was reviewed and a directed physical examination was performed prior to the procedure. Patient was monitored throughout the procedure with pulse oximetry and periodic assessment of vital signs. Patient was sedated as noted above. With the patient in the left lateral decubitus position, the Olympus videoendoscope was placed in the patient's mouth and under direct visualization passed into the esophagus. Visualization of the esophagus, stomach, and duodenum was performed during both introduction and withdrawal of the endoscope and retroflexed view of the proximal stomach was obtained. The scope was passed to the 2nd portion of the duodenum. The patient tolerated the procedure well and was taken to the recovery area in good condition. Findings[de-identified]   Esophagus: Large esophageal varices in the lower esophagus. Stomach: Stomach. Large gastric varices in cardia and body with one of the varix with minimal oozing of the blood. There was moderate portal hypertensive gastropathy. Stomach was otherwise normal.  Duodenum: normal    Recommendations: Keep NPO. H/H q 8 hrs. IR evaluation for TIPS revision.  Call placed to on call radiologist spoke with Dr Rafal Novak who will revie the case.      Electronically signed by Ellie Arrieta MD, FACG  on 12/29/2022 at 5:30 PM

## 2022-12-29 NOTE — ANESTHESIA PRE PROCEDURE
Department of Anesthesiology  Preprocedure Note       Name:  Maryuri Arredondo   Age:  60 y.o.  :  1960                                          MRN:  [id number]         Date:  2022      Surgeon: Juan Vo):  Merlin Fischer MD    Procedure:     Medications prior to admission:   Prior to Admission medications    Medication Sig Start Date End Date Taking? Authorizing Provider   diphenhydrAMINE (BENADRYL) 25 MG tablet Take 1 tablet by mouth every 6 hours as needed for Itching  Patient not taking: No sig reported 22  Raymundo COLE Blood,    pantoprazole (PROTONIX) 40 MG tablet Take 40 mg by mouth daily 10/4/22   Historical Provider, MD   FERWILD 325 (65 Fe) MG tablet take 1 tablet by mouth twice a day 10/7/22   VASU Montaño   nadolol (CORGARD) 20 MG tablet take 1 tablet by mouth once daily 22   MASSIMO Olivera NP   midodrine (PROAMATINE) 5 MG tablet Take 2 tablets by mouth in the morning and 2 tablets at noon and 2 tablets before bedtime. Patient not taking: No sig reported 22   MASSIMO Olivera NP   furosemide (LASIX) 20 MG tablet Take 1 tablet by mouth 2 times daily 22   MASSIMO Olivera NP   spironolactone (ALDACTONE) 100 MG tablet Take 1 tablet by mouth every evening 22   MASSIMO Olivera NP   ondansetron (ZOFRAN-ODT) 4 MG disintegrating tablet dissolve 1 tablet by mouth every 8 hours if needed for nausea and vomiting  Patient not taking: No sig reported 22   VASU Montaño   lactulose [facility]) 10 GM/15ML solution take 30 milliliters by mouth three times a day 22   VASU Montaño   FLUoxetine (PROZAC) 20 MG capsule Take 1 capsule by mouth daily 22   Alyson Card MD   atorvastatin (LIPITOR) 20 MG tablet Take 1 tablet by mouth nightly 22   Alyson Card MD       Current medications:    No current facility-administered medications for this visit. No current outpatient medications on file. Facility-Administered Medications Ordered in Other Visits   Medication Dose Route Frequency Provider Last Rate Last Admin    [MAR Hold] piperacillin-tazobactam (ZOSYN) 3,375 mg in dextrose 5 % 50 mL IVPB extended infusion (mini-bag)  3,375 mg IntraVENous Q8H Cassie López MD   Stopped at 12/29/22 0959    [MAR Hold] potassium chloride 10 mEq/100 mL IVPB (Peripheral Line)  10 mEq IntraVENous PRN [facility]] magnesium sulfate 2000 mg in 50 mL IVPB premix  2,000 mg IntraVENous PRN Jose Juan Velazquez MD        [facility] Hold] diphenhydrAMINE (BENADRYL) injection 25 mg  25 mg IntraVENous Q6H PRN Jose Juan Velazquez MD   25 mg at 12/29/22 0938    [MAR Hold] 0.9 % sodium chloride infusion   IntraVENous PRN Jose Juan Velazquez MD        0.9 % sodium chloride infusion   IntraVENous Continuous Pop Singh  mL/hr at 12/29/22 1129 New Bag at 12/29/22 1129    [MAR Hold] 0.9 % sodium chloride infusion   IntraVENous Continuous MASSIMO Carroll NP   Stopped at 12/29/22 0559    [MAR Hold] sodium chloride flush 0.9 % injection 5-40 mL  5-40 mL IntraVENous 2 times per day MASSIMO Carroll NP   10 mL at 12/29/22 0900    [MAR Hold] sodium chloride flush 0.9 % injection 5-40 mL  5-40 mL IntraVENous PRN MASSIMO Carroll NP        [facility] Hold] 0.9 % sodium chloride infusion   IntraVENous PRN MASSIMO Carroll NP        [facility] Hold] ondansetron (ZOFRAN-ODT) disintegrating tablet 4 mg  4 mg Oral Q8H PRN MASSIMO Carroll NP   4 mg at 12/29/22 0016    Or    [MAR Hold] ondansetron (ZOFRAN) injection 4 mg  4 mg IntraVENous Q6H PRN MASSIMO Carroll NP        [facility] Hold] acetaminophen (TYLENOL) tablet 650 mg  650 mg Oral Q6H PRN MASSIMO Carroll NP        Or   Be[facility] Humphries Hold] acetaminophen (TYLENOL) suppository 650 mg  650 mg Rectal Q6H PRN MASSIMO Carroll NP [facility] Hold] octreotide (SANDOSTATIN) 500 mcg in sodium chloride 0.9 % 100 mL infusion 25 mcg/hr IntraVENous Continuous Ardelle Sine, APRN - NP 5 mL/hr at 12/29/22 0616 25 mcg/hr at 12/29/22 0616    [MAR Hold] pantoprazole (PROTONIX) 80 mg in sodium chloride 0.9 % 100 mL infusion  8 mg/hr IntraVENous Continuous Ardelle Sine, APRN - NP 10 mL/hr at 12/29/22 0616 8 mg/hr at 12/29/22 5090       Allergies:  No Known Allergies    Problem List:    Patient Active Problem List   Diagnosis Code    Back pain, chronic M54.9, G89.29    Hearing difficulty H91.90    GERD (gastroesophageal reflux disease) K21.9    Cervical radicular pain M54.12    Alcohol withdrawal syndrome without complication (HCC) Z63.739    Hypomagnesemia E83.42    Chronic viral hepatitis B without delta agent and without coma (HCC) B18.1    Calculus of gallbladder without cholecystitis K80.20    Hep C w/o coma, chronic (HCC) B18.2    Fatty liver K76.0    Psychophysiologic insomnia F51.04    Cirrhosis ([facility] [address] K74.60    Decompensated hepatic cirrhosis (HCC) K72.90, K74.60    Vertebrogenic low back pain M54.51    DDD (degenerative disc disease), lumbar M51.36    Depression F32. A    Tubular adenoma of colon D12.6    History of colon polyps Z86.010    Gynecomastia, male N58    Lumbar radiculitis M54.16    Lumbar disc herniation M51.26    Tinnitus H93.19    Eustachian tube dysfunction H69.80    Ganglion cyst M67.40    Carpal tunnel syndrome of right wrist G56.01    History of hepatitis C Z86.19    Vitamin D deficiency E55.9    Pure hypercholesterolemia E78.00    Hypokalemia E87.6    Essential hypertension I10    Recurrent major depressive disorder in partial remission (HCC) F33.41    S/P epidural steroid injection Z92.241    Elevated LFTs R79.89    Seasonal allergies J30.2    Lumbar facet arthropathy M47.816    Cervical facet syndrome M47.812    Acute upper GI hemorrhage K92.2    Thrombocytopenia (HCC) D69.6    Hepatitis C virus infection resolved after antiviral drug therapy Z86.19    Gastrointestinal hemorrhage with melena K92.1    Alcohol abuse F10.10    Altered mental status R41.82    Hypocalcemia E83.51    Hypophosphatemia E83.39    Malignant neoplasm of lower third of esophagus (HCC) C15.5    COVID-19 U07.1    Anxiety F41.9    Current smoker F17.200    Severe comorbid illness R69    Gait instability R26.81    Abnormal findings on diagnostic imaging of spine R93.7    Cervical spinal stenosis M48.02    Spinal stenosis of lumbar region with neurogenic claudication M48.062    Low hemoglobin D64.9    Symptomatic anemia, microcytic, acute D64.9    Hypotension I95.9    Former smoker, 50+ pack years, quit 2016 Z87.891    HLD (hyperlipidemia) E78.5    Esophageal adenocarcinoma (HCC) C15.9    Anemia D64.9    Acute kidney injury ([address] N17.9    Ascites due to alcoholic cirrhosis (HCC) M12.45    Shortness of breath R06.02    Drop in hemoglobin R71.0    Portal hypertension (HCC) K76.6    Esophageal varices with bleeding (HCC) I85.01    Esophageal polyp K22.81    Acute kidney failure, unspecified (HCC) N17.9    Muscle weakness (generalized) M62.81    Other abnormalities of gait and mobility R26.89    GI bleed K92.2    Goals of care, counseling/discussion Z71.89    ACP (advance care planning) Z71.89    Palliative care encounter Z51.5    S/P TIPS (transjugular intrahepatic portosystemic shunt) Z95.828    Acute blood loss anemia D62    Near syncope R55    Bleeding gastric varices I86.4    Hepatic encephalopathy B81.34    Periumbilical abdominal pain R10.33       Past Medical History:        Diagnosis Date    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 ([address]     Alcoholic cirrhosis of liver with ascites ([address]     Anemia 04/13/2022    Anxiety     Arthritis     Back pain, chronic     dr. Mary Anne Correia, orthopedic, every 3-4 months, gets steroid injection    Lezama esophagus     BPH (benign prostatic hypertrophy)     Cholelithiasis     Cirrhosis ([address]     COVID-19 12/2020    pt reports he had a positive test while at [facility] in 2020, was asymptomatic    COVID-19 vaccine series completed 5/20/2021, 6/22/2021    Moderna 5/20/2021, 6/22/2021    DDD (degenerative disc disease), lumbar     Depression     Esophageal cancer (HCC)     INVASIVE ADENOCARCINOMA ARISING IN TUBULAR ADENOMA WITH HIGH GRADE DYSPLASIA, ASSOCIATED WITH FOCAL INTESTINAL METAPLASIA     Esophageal varices ([address]     Fatty liver     GERD (gastroesophageal reflux disease)     GI bleed     Hep C w/o coma, chronic (HCC)     History of alcohol abuse     6-12 beers a day; quit drinking 2019    History of blood transfusion     History of colon polyps 2016    History of tobacco abuse     Quechan (hard of hearing)     Hyperlipidemia     Hypertension     Hyponatremia 07/20/2016    Hypotension 12/20/2021    PONV (postoperative nausea and vomiting)     Port-A-Cath in place     right upper chest    Portal hypertension (HCC)     Sciatica     Secondary esophageal varices ([address] 06/07/2022    Shortness of breath     Spinal stenosis     Stomach ulcer     hx of    Thrombocytopenia ([address] 12/23/2020    Tubular adenoma of colon 2016, 2018    Vitamin D deficiency     Wears glasses        Past Surgical History:        Procedure Laterality Date    BUNIONECTOMY      twice on right side    BUNIONECTOMY Left     CARPAL TUNNEL RELEASE Right     COLONOSCOPY      at age 35    COLONOSCOPY  10/05/2016    polyps-pathology tubular adenoma, and abnormal looking mucosa right colon-pathology-tubular adenoma    COLONOSCOPY N/A 03/30/2018    COLONOSCOPY POLYPECTOMY COLD BIOPSY performed by Marietta Munoz MD at [address]  03/30/2018    Small polyp in the sigmoid colon and excised with biopsy forceps--tubular adenoma    COLONOSCOPY N/A 04/16/2022    COLONOSCOPY POLYPECTOMY performed by Marietta Munoz MD at [address] [place], [place] [address], COLON, DIAGNOSTIC      EGD    IR PORT PLACEMENT EQUAL OR GREATER THAN 5 YEARS  04/19/2021    IR PORT PLACEMENT EQUAL OR GREATER THAN 5 YEARS 4/19/2021 [facility]    IR TIPS INSERTION  12/01/2022    IR TIPS INSERTION 12/1/2022 Adi Aggarwal MD [facility]    KNEE SURGERY Left     cyst removed    NASAL SEPTUM SURGERY      NERVE BLOCK Right 11/23/2020    NERVE BLOCK RIGHT CERVICAL STEROID INJECTION  C3-C6 performed by Niko Rai MD at [address] [facility]  01/04/2016    steroid injection C7 T1    OTHER SURGICAL HISTORY  11/21/2016    Bilateral Lumbar CACHORRO L4-L5 injections    OTHER SURGICAL HISTORY  12/19/2016    lumbar steroid injection    OTHER SURGICAL HISTORY  09/28/2018    BILATERAL L5 CACHORRO (N/A Back)    OTHER SURGICAL HISTORY Right 11/23/2020    cervical injection    PAIN MANAGEMENT PROCEDURE Left 07/09/2020    EPIDURAL STEROID INJECTION LEFT L4 L5 performed by Niko Rai MD at [facility] Left 07/20/2020    LEFT L4 L5 EPIDURAL STEROID INJECTION performed by Niko Rai MD at [facility] Bilateral 08/17/2020    LUMBAR FACET BILATERAL L2-L5 performed by Niko Rai MD at [facility] Bilateral 12/07/2020    NERVE BLOCK BILATERAL LUMBAR MEDIAL BRANCH L2-L5 performed by Niko Rai MD at [address]      Multiple    [place] [address] AA&/STRD TFRML EPI LUMBAR/SACRAL 1 LEVEL Bilateral 09/06/2018    BILATERAL L5 CACHORRO performed by Niko Rai MD at [address] AA&/STRD TFRML EPI LUMBAR/SACRAL 1 LEVEL N/A 09/28/2018    BILATERAL L5 CACHORRO performed by Niko Rai MD at [address] [place] [address] N/CARPAL TUNNEL SURG Right 08/29/2017    CARPAL TUNNEL RELEASE RIGHT performed by Horacio Arreola MD at [address] [place] [address] N/CARPAL TUNNEL SURG Left 10/31/2017    CARPAL TUNNEL RELEASE performed by Horacio Arreola MD at [place] [address] 12/29/2020    EGD BIOPSY performed by Albert Mccord MD at [address] [place] [address] GASTROINTESTINAL ENDOSCOPY N/A 2021    EGD BIOPSY and spot marking performed by Jessica Carver MD at [facility] 2021    ENDOSCOPIC ULTRASOUND, EGD performed by Rachel Turpin MD at [place] [address]  2021    EGD DIAGNOSTIC ONLY performed by Rachel Turpin MD at [place] [address] N/A 2021    EGD BIOPSY performed by Jessica Carver MD at [facility] 2022    EGD BIOPSY performed by Jessica Carver MD at [facility] N/ 04/15/2022    EGD ESOPHAGOGASTRODUODENOSCOPY performed by Violetta Leija MD at [facility] 2022    EGD BAND LIGATION performed by Mee Gan MD at [facility] N/ 2022    EGD ESOPHAGOGASTRODUODENOSCOPY performed by Mee Gan MD at [facility]/ 2022    EGD ESOPHAGOGASTRODUODENOSCOPY ENDOSCOPIC APC AT [place] [address] performed by Chevy Malloy MD at [facility] 10/19/2022    EGD BIOPSY performed by Jessica Carver MD at [facility] N/ 10/31/2022    EGD with APC performed by Jessica Carver MD at [facility] History:    Social History     Tobacco Use    Smoking status: Former     Packs/day: 1.00     Years: 45.00     Pack years: 45.00     Types: Cigarettes     Quit date: 2017     Years since quittin.9    Smokeless tobacco: Never   Substance Use Topics    Alcohol use: Not Currently     Comment: Quit alcohol in 2019- heavier drinking prior to quitting                                Counseling given: Not Answered      Vital Signs (Current): There were no vitals filed for this visit. BP Readings from Last 3 Encounters:   12/29/22 126/67   12/28/22 118/65   12/23/22 119/68       NPO Status:                                                                                 BMI:   Wt Readings from Last 3 Encounters:   12/29/22 190 lb (86.2 kg)   12/28/22 197 lb 5 oz (89.5 kg)   12/16/22 214 lb 15.2 oz (97.5 kg)     There is no height or weight on file to calculate BMI.    CBC:   Lab Results   Component Value Date/Time    WBC 4.4 12/28/2022 11:56 PM    RBC 2.79 12/28/2022 11:56 PM    RBC 4.75 04/19/2012 11:30 AM    HGB 7.2 12/29/2022 05:44 AM    HCT 24.5 12/29/2022 05:44 AM    MCV 82.8 12/28/2022 11:56 PM    RDW 17.2 12/28/2022 11:56 PM    PLT See Reflexed IPF Result 12/28/2022 11:56 PM     04/19/2012 11:30 AM       CMP:   Lab Results   Component Value Date/Time     12/29/2022 05:44 AM    K 3.5 12/29/2022 05:44 AM     12/29/2022 05:44 AM    CO2 19 12/29/2022 05:44 AM    BUN 11 12/29/2022 05:44 AM    CREATININE 0.61 12/29/2022 05:44 AM    GFRAA >60 09/28/2022 01:33 PM    LABGLOM >60 12/29/2022 05:44 AM    GLUCOSE 79 12/29/2022 05:44 AM    GLUCOSE 65 04/19/2012 11:30 AM    PROT 4.5 12/27/2022 05:34 PM    CALCIUM 7.6 12/29/2022 05:44 AM    BILITOT 2.3 12/27/2022 05:34 PM    ALKPHOS 209 12/27/2022 05:34 PM    AST 45 12/27/2022 05:34 PM    ALT 16 12/27/2022 05:34 PM       POC Tests: No results for input(s): POCGLU, POCNA, POCK, POCCL, POCBUN, POCHEMO, POCHCT in the last 72 hours.     Coags:   Lab Results   Component Value Date/Time    PROTIME 13.8 12/29/2022 05:44 AM    INR 1.3 12/29/2022 05:44 AM    APTT 29.0 12/29/2022 05:44 AM       HCG (If Applicable): No results found for: PREGTESTUR, PREGSERUM, HCG, HCGQUANT     ABGs: No results found for: PHART, PO2ART, PND1UCO, SDM7ROJ, BEART, G6XUUIVU     Type & Screen (If Applicable):  No results found for: LABABO, LABRH    Drug/Infectious Status (If Applicable):  Lab Results   Component Value Date/Time    HEPCAB REACTIVE 12/23/2020 05:09 PM       COVID-19 Screening (If Applicable):   Lab Results   Component Value Date/Time    COVID19 Not Detected 09/12/2022 10:32 PM    COVID19 Not Detected 07/17/2020 10:00 AM           Anesthesia Evaluation  Patient summary reviewed no history of anesthetic complications:   Airway: Mallampati: II  TM distance: >3 FB   Neck ROM: full  Mouth opening: > = 3 FB   Dental:          Pulmonary:normal exam    (+) shortness of breath: no interval change,                             Cardiovascular:    (+) hypertension: no interval change,       ECG reviewed  Rhythm: regular  Rate: normal                    Neuro/Psych:   (+) neuromuscular disease:, psychiatric history:depression/anxiety             GI/Hepatic/Renal:   (+) GERD: no interval change, PUD, hepatitis: C, liver disease: portal hypertension,           Endo/Other: Negative Endo/Other ROS   (+) blood dyscrasia: anemia:., .                 Abdominal:             Vascular: negative vascular ROS. Other Findings:             Anesthesia Plan      MAC and general     ASA 3       Induction: intravenous. Anesthetic plan and risks discussed with patient. Plan discussed with CRNA.                     Jim Monzon MD   12/29/2022

## 2022-12-30 ENCOUNTER — APPOINTMENT (OUTPATIENT)
Dept: GENERAL RADIOLOGY | Age: 62
End: 2022-12-30
Attending: INTERNAL MEDICINE
Payer: MEDICARE

## 2022-12-30 ENCOUNTER — ANESTHESIA (OUTPATIENT)
Dept: INTERVENTIONAL RADIOLOGY/VASCULAR | Age: 62
End: 2022-12-30
Payer: MEDICARE

## 2022-12-30 ENCOUNTER — APPOINTMENT (OUTPATIENT)
Dept: INTERVENTIONAL RADIOLOGY/VASCULAR | Age: 62
End: 2022-12-30
Attending: INTERNAL MEDICINE
Payer: MEDICARE

## 2022-12-30 ENCOUNTER — APPOINTMENT (OUTPATIENT)
Dept: ULTRASOUND IMAGING | Age: 62
End: 2022-12-30
Attending: INTERNAL MEDICINE
Payer: MEDICARE

## 2022-12-30 ENCOUNTER — ANESTHESIA EVENT (OUTPATIENT)
Dept: INTERVENTIONAL RADIOLOGY/VASCULAR | Age: 62
End: 2022-12-30
Payer: MEDICARE

## 2022-12-30 PROBLEM — K22.719 BARRETT'S ESOPHAGUS WITH DYSPLASIA: Status: ACTIVE | Noted: 2022-12-30

## 2022-12-30 LAB
A1 LECTIN: NEGATIVE
ABO/RH: NORMAL
ABO/RH: NORMAL
ABSOLUTE EOS #: 0 K/UL (ref 0–0.4)
ABSOLUTE EOS #: 0.11 K/UL (ref 0–0.4)
ABSOLUTE IMMATURE GRANULOCYTE: 0 K/UL (ref 0–0.3)
ABSOLUTE IMMATURE GRANULOCYTE: 0.06 K/UL (ref 0–0.3)
ABSOLUTE LYMPH #: 0.06 K/UL (ref 1–4.8)
ABSOLUTE LYMPH #: 0.17 K/UL (ref 1–4.8)
ABSOLUTE MONO #: 0.17 K/UL (ref 0.1–0.8)
ABSOLUTE MONO #: 0.34 K/UL (ref 0.1–0.8)
ALBUMIN SERPL-MCNC: 2.5 G/DL (ref 3.5–5.2)
ALBUMIN SERPL-MCNC: 2.7 G/DL (ref 3.5–5.2)
ALBUMIN/GLOBULIN RATIO: 1.2 (ref 1–2.5)
ALBUMIN/GLOBULIN RATIO: 1.4 (ref 1–2.5)
ALP BLD-CCNC: 141 U/L (ref 40–129)
ALP BLD-CCNC: 144 U/L (ref 40–129)
ALT SERPL-CCNC: 13 U/L (ref 5–41)
ALT SERPL-CCNC: 14 U/L (ref 5–41)
AMMONIA: 53 UMOL/L (ref 16–60)
ANION GAP SERPL CALCULATED.3IONS-SCNC: 15 MMOL/L (ref 9–17)
ANION GAP SERPL CALCULATED.3IONS-SCNC: 17 MMOL/L (ref 9–17)
ANTIBODY SCREEN: NEGATIVE
ANTIBODY SCREEN: NEGATIVE
ARM BAND NUMBER: NORMAL
ARM BAND NUMBER: NORMAL
AST SERPL-CCNC: 36 U/L
AST SERPL-CCNC: 38 U/L
BASOPHILS # BLD: 0 % (ref 0–2)
BASOPHILS # BLD: 0 % (ref 0–2)
BASOPHILS ABSOLUTE: 0 K/UL (ref 0–0.2)
BASOPHILS ABSOLUTE: 0 K/UL (ref 0–0.2)
BILIRUB SERPL-MCNC: 4.7 MG/DL (ref 0.3–1.2)
BILIRUB SERPL-MCNC: 4.7 MG/DL (ref 0.3–1.2)
BILIRUBIN URINE: NEGATIVE
BLD PROD TYP BPU: NORMAL
BLOOD BANK BLOOD PRODUCT EXPIRATION DATE: NORMAL
BLOOD BANK ISBT PRODUCT BLOOD TYPE: 5100
BLOOD BANK ISBT PRODUCT BLOOD TYPE: 7300
BLOOD BANK PRODUCT CODE: NORMAL
BLOOD BANK UNIT TYPE AND RH: NORMAL
BPU ID: NORMAL
BUN BLDV-MCNC: 11 MG/DL (ref 8–23)
BUN BLDV-MCNC: 12 MG/DL (ref 8–23)
C-REACTIVE PROTEIN: 19.5 MG/L (ref 0–5)
CALCIUM SERPL-MCNC: 7.8 MG/DL (ref 8.6–10.4)
CALCIUM SERPL-MCNC: 7.9 MG/DL (ref 8.6–10.4)
CARBOXYHEMOGLOBIN: 2.1 % (ref 0–5)
CASTS UA: NORMAL /LPF (ref 0–8)
CHLORIDE BLD-SCNC: 101 MMOL/L (ref 98–107)
CHLORIDE BLD-SCNC: 102 MMOL/L (ref 98–107)
CO2: 17 MMOL/L (ref 20–31)
CO2: 18 MMOL/L (ref 20–31)
COLOR: ABNORMAL
CREAT SERPL-MCNC: 0.67 MG/DL (ref 0.7–1.2)
CREAT SERPL-MCNC: 0.72 MG/DL (ref 0.7–1.2)
CROSSMATCH RESULT: NORMAL
DISPENSE STATUS BLOOD BANK: NORMAL
EOSINOPHILS RELATIVE PERCENT: 0 % (ref 1–4)
EOSINOPHILS RELATIVE PERCENT: 2 % (ref 1–4)
EPITHELIAL CELLS UA: NORMAL /HPF (ref 0–5)
EXPIRATION DATE: NORMAL
EXPIRATION DATE: NORMAL
FIO2: ABNORMAL
GFR SERPL CREATININE-BSD FRML MDRD: >60 ML/MIN/1.73M2
GFR SERPL CREATININE-BSD FRML MDRD: >60 ML/MIN/1.73M2
GLUCOSE BLD-MCNC: 79 MG/DL (ref 75–110)
GLUCOSE BLD-MCNC: 82 MG/DL (ref 70–99)
GLUCOSE BLD-MCNC: 84 MG/DL (ref 70–99)
GLUCOSE BLD-MCNC: 93 MG/DL (ref 75–110)
GLUCOSE URINE: NEGATIVE
HCO3 VENOUS: 18.1 MMOL/L (ref 24–30)
HCT VFR BLD CALC: 28.9 % (ref 40.7–50.3)
HCT VFR BLD CALC: 28.9 % (ref 40.7–50.3)
HEMOGLOBIN: 8.6 G/DL (ref 13–17)
HEMOGLOBIN: 8.7 G/DL (ref 13–17)
IMMATURE GRANULOCYTES: 0 %
IMMATURE GRANULOCYTES: 1 %
KETONES, URINE: ABNORMAL
LACTIC ACID, WHOLE BLOOD: 1.1 MMOL/L (ref 0.7–2.1)
LEUKOCYTE ESTERASE, URINE: ABNORMAL
LYMPHOCYTES # BLD: 1 % (ref 24–44)
LYMPHOCYTES # BLD: 3 % (ref 24–44)
MCH RBC QN AUTO: 25.3 PG (ref 25.2–33.5)
MCH RBC QN AUTO: 25.5 PG (ref 25.2–33.5)
MCHC RBC AUTO-ENTMCNC: 29.8 G/DL (ref 28.4–34.8)
MCHC RBC AUTO-ENTMCNC: 30.1 G/DL (ref 28.4–34.8)
MCV RBC AUTO: 84 FL (ref 82.6–102.9)
MCV RBC AUTO: 85.8 FL (ref 82.6–102.9)
MONOCYTES # BLD: 3 % (ref 1–7)
MONOCYTES # BLD: 6 % (ref 1–7)
MORPHOLOGY: ABNORMAL
NEGATIVE BASE EXCESS, VEN: 4.8 MMOL/L (ref 0–2)
NITRITE, URINE: NEGATIVE
NRBC AUTOMATED: 0 PER 100 WBC
NRBC AUTOMATED: 0 PER 100 WBC
O2 SAT, VEN: 93.7 % (ref 60–85)
PATIENT TEMP: 37
PCO2, VEN: 28 MM HG (ref 39–55)
PDW BLD-RTO: 18.3 % (ref 11.8–14.4)
PDW BLD-RTO: 18.9 % (ref 11.8–14.4)
PH UA: 6 (ref 5–8)
PH VENOUS: 7.43 (ref 7.32–7.42)
PHOSPHORUS: 3.2 MG/DL (ref 2.5–4.5)
PLATELET # BLD: 88 K/UL (ref 138–453)
PLATELET # BLD: 90 K/UL (ref 138–453)
PMV BLD AUTO: 9.8 FL (ref 8.1–13.5)
PMV BLD AUTO: 9.9 FL (ref 8.1–13.5)
PO2, VEN: 65.3 MM HG (ref 30–50)
POTASSIUM SERPL-SCNC: 3.5 MMOL/L (ref 3.7–5.3)
POTASSIUM SERPL-SCNC: 3.6 MMOL/L (ref 3.7–5.3)
PRO-BNP: 556 PG/ML
PROTEIN UA: ABNORMAL
RBC # BLD: 3.37 M/UL (ref 4.21–5.77)
RBC # BLD: 3.44 M/UL (ref 4.21–5.77)
RBC UA: NORMAL /HPF (ref 0–4)
SEG NEUTROPHILS: 91 % (ref 36–66)
SEG NEUTROPHILS: 93 % (ref 36–66)
SEGMENTED NEUTROPHILS ABSOLUTE COUNT: 5.19 K/UL (ref 1.8–7.7)
SEGMENTED NEUTROPHILS ABSOLUTE COUNT: 5.3 K/UL (ref 1.8–7.7)
SODIUM BLD-SCNC: 135 MMOL/L (ref 135–144)
SODIUM BLD-SCNC: 135 MMOL/L (ref 135–144)
SPECIFIC GRAVITY UA: 1.07 (ref 1–1.03)
TOTAL PROTEIN: 4.6 G/DL (ref 6.4–8.3)
TOTAL PROTEIN: 4.7 G/DL (ref 6.4–8.3)
TRANSFUSION STATUS: NORMAL
TURBIDITY: CLEAR
UNIT DIVISION: 0
UNIT ISSUE DATE/TIME: NORMAL
URINE HGB: ABNORMAL
UROBILINOGEN, URINE: NORMAL
WBC # BLD: 5.7 K/UL (ref 3.5–11.3)
WBC # BLD: 5.7 K/UL (ref 3.5–11.3)
WBC UA: NORMAL /HPF (ref 0–5)

## 2022-12-30 PROCEDURE — 6360000004 HC RX CONTRAST MEDICATION: Performed by: INTERNAL MEDICINE

## 2022-12-30 PROCEDURE — 2580000003 HC RX 258: Performed by: INTERNAL MEDICINE

## 2022-12-30 PROCEDURE — C1894 INTRO/SHEATH, NON-LASER: HCPCS

## 2022-12-30 PROCEDURE — 3700000000 HC ANESTHESIA ATTENDED CARE

## 2022-12-30 PROCEDURE — 2709999900 HC NON-CHARGEABLE SUPPLY

## 2022-12-30 PROCEDURE — 6360000002 HC RX W HCPCS: Performed by: INTERNAL MEDICINE

## 2022-12-30 PROCEDURE — 2500000003 HC RX 250 WO HCPCS: Performed by: INTERNAL MEDICINE

## 2022-12-30 PROCEDURE — 7100000000 HC PACU RECOVERY - FIRST 15 MIN

## 2022-12-30 PROCEDURE — 99291 CRITICAL CARE FIRST HOUR: CPT | Performed by: INTERNAL MEDICINE

## 2022-12-30 PROCEDURE — 6370000000 HC RX 637 (ALT 250 FOR IP): Performed by: INTERNAL MEDICINE

## 2022-12-30 PROCEDURE — C1887 CATHETER, GUIDING: HCPCS

## 2022-12-30 PROCEDURE — 85025 COMPLETE CBC W/AUTO DIFF WBC: CPT

## 2022-12-30 PROCEDURE — 2580000003 HC RX 258: Performed by: CLINICAL NURSE SPECIALIST

## 2022-12-30 PROCEDURE — 3700000001 HC ADD 15 MINUTES (ANESTHESIA)

## 2022-12-30 PROCEDURE — 6360000002 HC RX W HCPCS: Performed by: NURSE ANESTHETIST, CERTIFIED REGISTERED

## 2022-12-30 PROCEDURE — 06183J4 BYPASS PORTAL VEIN TO HEPATIC VEIN WITH SYNTHETIC SUBSTITUTE, PERCUTANEOUS APPROACH: ICD-10-PCS | Performed by: RADIOLOGY

## 2022-12-30 PROCEDURE — 81001 URINALYSIS AUTO W/SCOPE: CPT

## 2022-12-30 PROCEDURE — 7100000001 HC PACU RECOVERY - ADDTL 15 MIN

## 2022-12-30 PROCEDURE — 87086 URINE CULTURE/COLONY COUNT: CPT

## 2022-12-30 PROCEDURE — 2500000003 HC RX 250 WO HCPCS: Performed by: NURSE ANESTHETIST, CERTIFIED REGISTERED

## 2022-12-30 PROCEDURE — 76705 ECHO EXAM OF ABDOMEN: CPT

## 2022-12-30 PROCEDURE — 83605 ASSAY OF LACTIC ACID: CPT

## 2022-12-30 PROCEDURE — C1769 GUIDE WIRE: HCPCS

## 2022-12-30 PROCEDURE — 82805 BLOOD GASES W/O2 SATURATION: CPT

## 2022-12-30 PROCEDURE — C1725 CATH, TRANSLUMIN NON-LASER: HCPCS

## 2022-12-30 PROCEDURE — 86140 C-REACTIVE PROTEIN: CPT

## 2022-12-30 PROCEDURE — 83880 ASSAY OF NATRIURETIC PEPTIDE: CPT

## 2022-12-30 PROCEDURE — 80053 COMPREHEN METABOLIC PANEL: CPT

## 2022-12-30 PROCEDURE — 37241 VASC EMBOLIZE/OCCLUDE VENOUS: CPT

## 2022-12-30 PROCEDURE — P9073 PLATELETS PHERESIS PATH REDU: HCPCS

## 2022-12-30 PROCEDURE — 82140 ASSAY OF AMMONIA: CPT

## 2022-12-30 PROCEDURE — 75887 VEIN X-RAY LIVER W/O HEMODYN: CPT

## 2022-12-30 PROCEDURE — 36011 PLACE CATHETER IN VEIN: CPT

## 2022-12-30 PROCEDURE — 6360000002 HC RX W HCPCS

## 2022-12-30 PROCEDURE — 2720000010 IR REVISION TIPS

## 2022-12-30 PROCEDURE — 06L23DZ OCCLUSION OF GASTRIC VEIN WITH INTRALUMINAL DEVICE, PERCUTANEOUS APPROACH: ICD-10-PCS | Performed by: RADIOLOGY

## 2022-12-30 PROCEDURE — 2060000000 HC ICU INTERMEDIATE R&B

## 2022-12-30 PROCEDURE — 76499 UNLISTED DX RADIOGRAPHIC PX: CPT

## 2022-12-30 PROCEDURE — 2580000003 HC RX 258: Performed by: NURSE ANESTHETIST, CERTIFIED REGISTERED

## 2022-12-30 PROCEDURE — 84100 ASSAY OF PHOSPHORUS: CPT

## 2022-12-30 PROCEDURE — C9113 INJ PANTOPRAZOLE SODIUM, VIA: HCPCS | Performed by: INTERNAL MEDICINE

## 2022-12-30 PROCEDURE — 71045 X-RAY EXAM CHEST 1 VIEW: CPT

## 2022-12-30 PROCEDURE — 2500000003 HC RX 250 WO HCPCS

## 2022-12-30 PROCEDURE — 82947 ASSAY GLUCOSE BLOOD QUANT: CPT

## 2022-12-30 PROCEDURE — 06WY3JZ REVISION OF SYNTHETIC SUBSTITUTE IN LOWER VEIN, PERCUTANEOUS APPROACH: ICD-10-PCS | Performed by: RADIOLOGY

## 2022-12-30 PROCEDURE — 75885 VEIN X-RAY LIVER W/HEMODYNAM: CPT

## 2022-12-30 PROCEDURE — 36415 COLL VENOUS BLD VENIPUNCTURE: CPT

## 2022-12-30 RX ORDER — SODIUM CHLORIDE, SODIUM LACTATE, POTASSIUM CHLORIDE, CALCIUM CHLORIDE 600; 310; 30; 20 MG/100ML; MG/100ML; MG/100ML; MG/100ML
INJECTION, SOLUTION INTRAVENOUS CONTINUOUS PRN
Status: DISCONTINUED | OUTPATIENT
Start: 2022-12-30 | End: 2022-12-30 | Stop reason: SDUPTHER

## 2022-12-30 RX ORDER — LANOLIN ALCOHOL/MO/W.PET/CERES
100 CREAM (GRAM) TOPICAL DAILY
Status: DISCONTINUED | OUTPATIENT
Start: 2022-12-30 | End: 2023-01-03

## 2022-12-30 RX ORDER — ONDANSETRON 2 MG/ML
INJECTION INTRAMUSCULAR; INTRAVENOUS PRN
Status: DISCONTINUED | OUTPATIENT
Start: 2022-12-30 | End: 2022-12-30 | Stop reason: SDUPTHER

## 2022-12-30 RX ORDER — SODIUM CHLORIDE 9 MG/ML
INJECTION, SOLUTION INTRAVENOUS PRN
Status: DISCONTINUED | OUTPATIENT
Start: 2022-12-30 | End: 2023-01-03

## 2022-12-30 RX ORDER — SODIUM CHLORIDE 0.9 % (FLUSH) 0.9 %
5-40 SYRINGE (ML) INJECTION EVERY 12 HOURS SCHEDULED
Status: DISCONTINUED | OUTPATIENT
Start: 2022-12-30 | End: 2023-01-11 | Stop reason: HOSPADM

## 2022-12-30 RX ORDER — LORAZEPAM 1 MG/1
1 TABLET ORAL
Status: DISCONTINUED | OUTPATIENT
Start: 2022-12-30 | End: 2023-01-03

## 2022-12-30 RX ORDER — ONDANSETRON 2 MG/ML
4 INJECTION INTRAMUSCULAR; INTRAVENOUS
Status: ACTIVE | OUTPATIENT
Start: 2022-12-30 | End: 2022-12-31

## 2022-12-30 RX ORDER — SODIUM CHLORIDE 0.9 % (FLUSH) 0.9 %
5-40 SYRINGE (ML) INJECTION PRN
Status: DISCONTINUED | OUTPATIENT
Start: 2022-12-30 | End: 2023-01-03 | Stop reason: HOSPADM

## 2022-12-30 RX ORDER — MIDAZOLAM HYDROCHLORIDE 1 MG/ML
INJECTION INTRAMUSCULAR; INTRAVENOUS
Status: COMPLETED
Start: 2022-12-30 | End: 2022-12-30

## 2022-12-30 RX ORDER — LORAZEPAM 2 MG/1
2 TABLET ORAL
Status: DISCONTINUED | OUTPATIENT
Start: 2022-12-30 | End: 2023-01-03

## 2022-12-30 RX ORDER — PROPOFOL 10 MG/ML
INJECTION, EMULSION INTRAVENOUS PRN
Status: DISCONTINUED | OUTPATIENT
Start: 2022-12-30 | End: 2022-12-30 | Stop reason: SDUPTHER

## 2022-12-30 RX ORDER — LACTULOSE 10 G/15ML
20 SOLUTION ORAL ONCE
Status: COMPLETED | OUTPATIENT
Start: 2022-12-30 | End: 2022-12-30

## 2022-12-30 RX ORDER — SODIUM CHLORIDE 9 MG/ML
INJECTION, SOLUTION INTRAVENOUS PRN
Status: DISCONTINUED | OUTPATIENT
Start: 2022-12-30 | End: 2023-01-03 | Stop reason: HOSPADM

## 2022-12-30 RX ORDER — LORAZEPAM 2 MG/ML
1 INJECTION INTRAMUSCULAR EVERY 6 HOURS PRN
Status: DISCONTINUED | OUTPATIENT
Start: 2022-12-30 | End: 2023-01-03

## 2022-12-30 RX ORDER — SODIUM CHLORIDE 0.9 % (FLUSH) 0.9 %
5-40 SYRINGE (ML) INJECTION EVERY 12 HOURS SCHEDULED
Status: DISCONTINUED | OUTPATIENT
Start: 2022-12-30 | End: 2023-01-03 | Stop reason: HOSPADM

## 2022-12-30 RX ORDER — LORAZEPAM 2 MG/ML
4 INJECTION INTRAMUSCULAR
Status: DISCONTINUED | OUTPATIENT
Start: 2022-12-30 | End: 2023-01-03

## 2022-12-30 RX ORDER — LORAZEPAM 2 MG/ML
2 INJECTION INTRAMUSCULAR
Status: DISCONTINUED | OUTPATIENT
Start: 2022-12-30 | End: 2023-01-03

## 2022-12-30 RX ORDER — LORAZEPAM 2 MG/ML
1 INJECTION INTRAMUSCULAR
Status: DISCONTINUED | OUTPATIENT
Start: 2022-12-30 | End: 2023-01-03

## 2022-12-30 RX ORDER — LIDOCAINE HYDROCHLORIDE 10 MG/ML
INJECTION, SOLUTION INFILTRATION; PERINEURAL PRN
Status: DISCONTINUED | OUTPATIENT
Start: 2022-12-30 | End: 2022-12-30 | Stop reason: SDUPTHER

## 2022-12-30 RX ORDER — SODIUM CHLORIDE 0.9 % (FLUSH) 0.9 %
5-40 SYRINGE (ML) INJECTION PRN
Status: DISCONTINUED | OUTPATIENT
Start: 2022-12-30 | End: 2023-01-11 | Stop reason: HOSPADM

## 2022-12-30 RX ORDER — HYDRALAZINE HYDROCHLORIDE 20 MG/ML
10 INJECTION INTRAMUSCULAR; INTRAVENOUS
Status: DISCONTINUED | OUTPATIENT
Start: 2022-12-30 | End: 2023-01-03

## 2022-12-30 RX ORDER — ROCURONIUM BROMIDE 10 MG/ML
INJECTION, SOLUTION INTRAVENOUS PRN
Status: DISCONTINUED | OUTPATIENT
Start: 2022-12-30 | End: 2022-12-30 | Stop reason: SDUPTHER

## 2022-12-30 RX ORDER — SODIUM CHLORIDE 9 MG/ML
INJECTION, SOLUTION INTRAVENOUS PRN
Status: DISCONTINUED | OUTPATIENT
Start: 2022-12-30 | End: 2023-01-11 | Stop reason: HOSPADM

## 2022-12-30 RX ORDER — LORAZEPAM 2 MG/ML
3 INJECTION INTRAMUSCULAR
Status: DISCONTINUED | OUTPATIENT
Start: 2022-12-30 | End: 2023-01-03

## 2022-12-30 RX ORDER — BACLOFEN 10 MG/1
10 TABLET ORAL 3 TIMES DAILY
Status: DISCONTINUED | OUTPATIENT
Start: 2022-12-30 | End: 2023-01-10

## 2022-12-30 RX ORDER — LORAZEPAM 2 MG/1
4 TABLET ORAL
Status: DISCONTINUED | OUTPATIENT
Start: 2022-12-30 | End: 2023-01-03

## 2022-12-30 RX ORDER — MIDAZOLAM HYDROCHLORIDE 2 MG/2ML
2 INJECTION, SOLUTION INTRAMUSCULAR; INTRAVENOUS ONCE
Status: COMPLETED | OUTPATIENT
Start: 2022-12-30 | End: 2022-12-30

## 2022-12-30 RX ORDER — FENTANYL CITRATE 50 UG/ML
INJECTION, SOLUTION INTRAMUSCULAR; INTRAVENOUS PRN
Status: DISCONTINUED | OUTPATIENT
Start: 2022-12-30 | End: 2022-12-30 | Stop reason: SDUPTHER

## 2022-12-30 RX ADMIN — SODIUM CHLORIDE: 9 INJECTION, SOLUTION INTRAVENOUS at 05:55

## 2022-12-30 RX ADMIN — CEFEPIME 2000 MG: 2 INJECTION, POWDER, FOR SOLUTION INTRAVENOUS at 20:50

## 2022-12-30 RX ADMIN — PROPOFOL 120 MG: 10 INJECTION, EMULSION INTRAVENOUS at 14:15

## 2022-12-30 RX ADMIN — SODIUM CHLORIDE, PRESERVATIVE FREE 10 ML: 5 INJECTION INTRAVENOUS at 20:52

## 2022-12-30 RX ADMIN — LORAZEPAM 4 MG: 2 INJECTION INTRAMUSCULAR at 05:02

## 2022-12-30 RX ADMIN — ONDANSETRON 4 MG: 2 INJECTION INTRAMUSCULAR; INTRAVENOUS at 17:25

## 2022-12-30 RX ADMIN — OLANZAPINE 5 MG: 10 INJECTION, POWDER, FOR SOLUTION INTRAMUSCULAR at 08:44

## 2022-12-30 RX ADMIN — OLANZAPINE 2.5 MG: 10 INJECTION, POWDER, FOR SOLUTION INTRAMUSCULAR at 01:41

## 2022-12-30 RX ADMIN — SODIUM CHLORIDE 8 MG/HR: 9 INJECTION, SOLUTION INTRAVENOUS at 02:14

## 2022-12-30 RX ADMIN — SODIUM CHLORIDE: 9 INJECTION, SOLUTION INTRAVENOUS at 04:29

## 2022-12-30 RX ADMIN — OCTREOTIDE ACETATE 25 MCG/HR: 500 INJECTION, SOLUTION INTRAVENOUS; SUBCUTANEOUS at 06:30

## 2022-12-30 RX ADMIN — SODIUM CHLORIDE 8 MG/HR: 9 INJECTION, SOLUTION INTRAVENOUS at 06:32

## 2022-12-30 RX ADMIN — FENTANYL CITRATE 50 MCG: 50 INJECTION, SOLUTION INTRAMUSCULAR; INTRAVENOUS at 14:15

## 2022-12-30 RX ADMIN — LACTULOSE 20 G: 20 SOLUTION ORAL at 12:29

## 2022-12-30 RX ADMIN — IOPAMIDOL 110 ML: 755 INJECTION, SOLUTION INTRAVENOUS at 17:50

## 2022-12-30 RX ADMIN — SUGAMMADEX 200 MG: 100 INJECTION, SOLUTION INTRAVENOUS at 17:25

## 2022-12-30 RX ADMIN — PIPERACILLIN AND TAZOBACTAM 3375 MG: 3; .375 INJECTION, POWDER, FOR SOLUTION INTRAVENOUS at 12:31

## 2022-12-30 RX ADMIN — SODIUM CHLORIDE, POTASSIUM CHLORIDE, SODIUM LACTATE AND CALCIUM CHLORIDE: 600; 310; 30; 20 INJECTION, SOLUTION INTRAVENOUS at 14:40

## 2022-12-30 RX ADMIN — ROCURONIUM BROMIDE 100 MG: 10 INJECTION, SOLUTION INTRAVENOUS at 14:15

## 2022-12-30 RX ADMIN — PIPERACILLIN AND TAZOBACTAM 3375 MG: 3; .375 INJECTION, POWDER, FOR SOLUTION INTRAVENOUS at 04:29

## 2022-12-30 RX ADMIN — MIDAZOLAM 2 MG: 1 INJECTION INTRAMUSCULAR; INTRAVENOUS at 13:39

## 2022-12-30 RX ADMIN — LORAZEPAM 4 MG: 2 INJECTION INTRAMUSCULAR at 06:09

## 2022-12-30 RX ADMIN — PHENYLEPHRINE HYDROCHLORIDE 100 MCG: 10 INJECTION INTRAVENOUS at 16:03

## 2022-12-30 RX ADMIN — LIDOCAINE HYDROCHLORIDE 50 MG: 10 INJECTION, SOLUTION INFILTRATION; PERINEURAL at 14:15

## 2022-12-30 RX ADMIN — MIDAZOLAM HYDROCHLORIDE 2 MG: 2 INJECTION, SOLUTION INTRAMUSCULAR; INTRAVENOUS at 13:39

## 2022-12-30 RX ADMIN — Medication 100 MG: at 08:44

## 2022-12-30 ASSESSMENT — ENCOUNTER SYMPTOMS: SHORTNESS OF BREATH: 1

## 2022-12-30 ASSESSMENT — PAIN - FUNCTIONAL ASSESSMENT: PAIN_FUNCTIONAL_ASSESSMENT: FACE, LEGS, ACTIVITY, CRY, AND CONSOLABILITY (FLACC)

## 2022-12-30 NOTE — PLAN OF CARE
Came to see patient. Patient off floor for TIPS revision. Will attempt to see patient later today. Maulik Galicia, APRN - CNP

## 2022-12-30 NOTE — CARE COORDINATION
Case Management Assessment  Initial Evaluation    Date/Time of Evaluation: 12/30/2022 5:59 PM  Assessment Completed by: Michelle Morel RN    If patient is discharged prior to next notation, then this note serves as note for discharge by case management. Patient Name: Heidi Quinteros                   YOB: 1960  Diagnosis: GI bleed [K92.2]                   Date / Time: 12/28/2022 11:30 PM    Patient Admission Status: Inpatient   Readmission Risk (Low < 19, Mod (19-27), High > 27): Readmission Risk Score: 28.9    Current PCP: VASU Masters  PCP verified by CM? Yes    Chart Reviewed: Yes      History Provided by: Child/Family (patient's ex wife Anette)  Patient Orientation: Other (see comment) (patient confused)    Patient Cognition: Severely Impaired    Hospitalization in the last 30 days (Readmission):  No    If yes, Readmission Assessment in CM Navigator will be completed. Advance Directives:      Code Status: Full Code   Patient's Primary Decision Maker is: Legal Next of Kin    Primary Decision Maker: Sana Lassiter  Other - [phone]    Discharge Planning:    Patient lives with: Alone Type of Home: House  Primary Care Giver: Spouse (ex wife Anette cares  for patient and takes him to appointments)  Patient Support Systems include: Spouse/Significant Other (ex wife Anette is patient's support system and helps with grocery shopping, taking to MD appointments, housework, etc)   Current Financial resources: Medicare  Current community resources:    Current services prior to admission: Home Care, Durable Medical Equipment ([facility] - current)            Current DME: [employer]            Type of Home Care services:  Nursing Services    ADLS  Prior functional level:  Independent in ADLs/IADLs  Current functional level: Assistance with the following:    PT AM-PAC:   /24  OT AM-PAC:   /24    Family can provide assistance at DC: Yes (ex wife yong)  Would you like Case Management to discuss the discharge plan with any other family members/significant others, and if so, who? Yes (ex wife yong)  Plans to Return to Present Housing: Yes  Other Identified Issues/Barriers to RETURNING to current housing: na  Potential Assistance needed at discharge: N/A            Potential DME:    Patient expects to discharge to: [address] [place] [address] for transportation at discharge: Family (ex wife)    Financial    Payor: Govind Barbour / Plan: Lucy Simple / Product Type: *No Product type* /     Does insurance require precert for SNF: Yes    Potential assistance Purchasing Medications: No  Meds-to-Beds request: Yes      [facility] [id number] - [address] [place] [address], [address] [address]  Phone: [phone] Fax: [phone]    [address] [place], [place] - CtraEmily Rhonda Mason [phone] Radha Bran [phone] [address] [address]  Phone: [phone] Fax: [phone]      Notes:    Factors facilitating achievement of predicted outcomes: Family support and Has needed Durable Medical Equipment at home    Barriers to discharge: Medical complications    Additional Case Management Notes: call placed to patient's ex wife yong who is patient's emergency contact to discuss transitional planning. Per Emma Escalona, she helps care for patient and takes him to all doctor's appointments as well as does his grocery shopping and cleans his home. She states patient lives alone and has [facility]. She states goal is for patient to return home with [facility] and she would provide transportation home. The Plan for Transition of Care is related to the following treatment goals of GI bleed [C30.7]    IF APPLICABLE: The Patient and/or patient representative Sunday Bebo and his family were provided with a choice of provider and agrees with the discharge plan.  Freedom of choice list with basic dialogue that supports the patient's individualized plan of care/goals and shares the quality data associated with the providers was provided to: Patient   Patient Representative Name:       The Patient and/or Patient Representative Agree with the Discharge Plan?  Yes    Marley Wolfe RN  Case Management Department  Ph: [phone] Fax: na

## 2022-12-30 NOTE — PROGRESS NOTES
Patient states he has not urinated today. Nothing charted for the time patient was in OR for EDG. Writer performed bladder scan, 21mL was the highest number. Patient became upset with writer for asking questions about voiding today, patient is currently hallucinating and displaying symptoms of paranoia/DT's. Admitting team contacted via secured message.

## 2022-12-30 NOTE — ANESTHESIA POSTPROCEDURE EVALUATION
Department of Anesthesiology  Postprocedure Note    Patient: Maryuri Arredondo  MRN: [id number]  YOB: 1960  Date of evaluation: 12/29/2022      Procedure Summary     Date: 12/29/22 Room / Location: [address]    Anesthesia Start: 5229 Anesthesia Stop: 4346    Procedure: EGD ESOPHAGOGASTRODUODENOSCOPY Diagnosis:       Gastrointestinal hemorrhage, unspecified gastrointestinal hemorrhage type      (Gastrointestinal hemorrhage, unspecified gastrointestinal hemorrhage type [K92.2])    Surgeons: Merlin Fischer MD Responsible Provider: Casimiro Yoder MD    Anesthesia Type: MAC ASA Status: 3          Anesthesia Type: No value filed.     Yen Phase I: Yen Score: 10    Yen Phase II:        Anesthesia Post Evaluation    Patient location during evaluation: bedside  Patient participation: complete - patient participated  Level of consciousness: awake  Airway patency: patent  Nausea & Vomiting: no nausea and no vomiting  Complications: no  Cardiovascular status: blood pressure returned to baseline  Respiratory status: acceptable  Hydration status: euvolemic  Comments: /73   Pulse 95   Temp 97 °F (36.1 °C) (Temporal)   Resp 16   Ht 5' 10\" (1.778 m)   Wt 190 lb (86.2 kg)   SpO2 99%   BMI 27.26 kg/m²

## 2022-12-30 NOTE — PLAN OF CARE
Problem: Discharge Planning  Goal: Discharge to home or other facility with appropriate resources  Outcome: Progressing  Flowsheets (Taken 12/29/2022 0800 by Verneta Landau, RN)  Discharge to home or other facility with appropriate resources: Identify barriers to discharge with patient and caregiver     Problem: Pain  Goal: Verbalizes/displays adequate comfort level or baseline comfort level  Outcome: Progressing     Problem: Respiratory - Adult  Goal: Achieves optimal ventilation and oxygenation  Outcome: Progressing     Problem: Cardiovascular - Adult  Goal: Maintains optimal cardiac output and hemodynamic stability  Outcome: Progressing  Goal: Absence of cardiac dysrhythmias or at baseline  Outcome: Progressing     Problem: Musculoskeletal - Adult  Goal: Return mobility to safest level of function  Outcome: Progressing  Goal: Maintain proper alignment of affected body part  Outcome: Progressing  Goal: Return ADL status to a safe level of function  Outcome: Progressing     Problem: Gastrointestinal - Adult  Goal: Minimal or absence of nausea and vomiting  Outcome: Progressing  Goal: Maintains or returns to baseline bowel function  Outcome: Progressing     Problem: Hematologic - Adult  Goal: Maintains hematologic stability  Outcome: Progressing  Flowsheets (Taken 12/29/2022 0800 by Verneta Landau, RN)  Maintains hematologic stability: Assess for signs and symptoms of bleeding or hemorrhage     Problem: Skin/Tissue Integrity  Goal: Absence of new skin breakdown  Description: 1. Monitor for areas of redness and/or skin breakdown  2. Assess vascular access sites hourly  3. Every 4-6 hours minimum:  Change oxygen saturation probe site  4. Every 4-6 hours:  If on nasal continuous positive airway pressure, respiratory therapy assess nares and determine need for appliance change or resting period.   Outcome: Progressing     Problem: Safety - Adult  Goal: Free from fall injury  Outcome: Progressing     Problem: ABCDS Injury Assessment  Goal: Absence of physical injury  Outcome: Progressing

## 2022-12-30 NOTE — PROGRESS NOTES
[facility]  Office: [address], DO, Archana Junior, DO, Guilherme Agarwal, DO, Williams Sanchez, DO, Ina Connors MD, Roberto De Luna MD, Christi Henry MD, Adeline Adame MD,  Briseyda Kim MD, Roberta Kimbrough MD, Sabrina Ramirez, DO, Feroz Ellsworth MD,  Diego Espana, DO, Aggie Washington MD, Edison Griffin MD, Tamara Boyd DO, Stephanie Arevalo MD, Quinn Arguelles MD, Ashu Lombardo DO, Briseyda Ramos MD, Manish Ferguson MD, Romi Penn MD, Shelia Vazquez MD, Meredith Lai DO, Paula Ivy MD, Laura Gregorio MD, Betty Chinchilla, CNP,  Matheus Biggs, CNP, Ghislaine Gan, CNP, Rafaela Tavares, CNP,  Bernardo Lennon, [facility] [place], Gilda Denis, CNP, Jorje Najera CNP, John Cano, CNP, Agnieszka Lassiter CNP, Chris Gross, CNP, Quin Hitchcock PA-C, Jeromy Edmonds, CNS, Maximiliano Hannah CNP, Joao Cleveland, [address] [place] [address]    Progress Note    12/30/2022    4:57 PM    Name:   Mary Colon  MRN:     [id number]     [place]:      [de-identified]   Room:   [address] [place] [address] Day:  2  Admit Date:  12/28/2022 11:30 PM    PCP:   VASU Esteves  Code Status:  Full Code    Subjective:     C/C: 43-year-old gentleman with known history of chronic alcohol abuse admitted with acute GI bleed with Lezama's esophagus/esophageal cancer with upper endoscopy revealing moderately large varices with moderate portal hypertensive gastropathy. Status post therapeutic paracentesis 4.5 L with positive leukocytosis on IV Zosyn for SBP prophylaxis. 3 units blood transfusion. Electrolytes replaced. Full code resuscitation status. Interval History Status: worsened. Brief History:   Patient was agitated this morning and wanted to leave the hospital.  He had a unwitnessed fall and since then, confused ,agitated. requring Zyprexa. Stat CT head, neck. Labs reviewed .   Chest x-ray showed bilateral pneumonia with mild to moderate pericardial effusion which was not seen on CT scan. With his history of chronic alcohol use and possibility of aspiration Pseudomonas coverage added with change from Zosyn to cefepime and vancomycin. Discontinue Zyprexa. He had revision today. T Bili4.7, CRP 19.5,       Review of Systems:   Unable to review since patient was sleeping    Medications:      Allergies:  No Known Allergies    Current Meds:   Scheduled Meds:    sodium chloride flush  5-40 mL IntraVENous 2 times per day    thiamine  100 mg Oral Daily    baclofen  10 mg Oral TID    cefepime  2,000 mg IntraVENous Q12H    vancomycin  15 mg/kg IntraVENous Q12H    sodium chloride flush  5-40 mL IntraVENous 2 times per day     Continuous Infusions:    sodium chloride      sodium chloride      sodium chloride      sodium chloride 75 mL/hr at 12/30/22 1404    octreotide (SandoSTATIN) infusion 25 mcg/hr (12/30/22 1404)    pantoprazole 8 mg/hr (12/30/22 1404)     PRN Meds: OLANZapine (ZyPREXA) in sterile water IntraMUSCular, sodium chloride flush, sodium chloride, LORazepam **OR** LORazepam **OR** LORazepam **OR** LORazepam **OR** LORazepam **OR** LORazepam **OR** LORazepam **OR** LORazepam, sodium chloride, LORazepam, potassium chloride, magnesium sulfate, diphenhydrAMINE, sodium chloride, sodium chloride flush, ondansetron **OR** ondansetron    Data:     Past Medical History:   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 ([facility] Alcoholic cirrhosis of liver with ascites ([facility] Anemia, Anxiety, Arthritis, Back pain, chronic, Lezama esophagus, BPH (benign prostatic hypertrophy), Cholelithiasis, Cirrhosis ([facility] COVID-19, COVID-19 vaccine series completed, DDD (degenerative disc disease), lumbar, Depression, Esophageal cancer ([facility] Esophageal varices ([facility] Fatty liver, GERD (gastroesophageal reflux disease), GI bleed, Hep C w/o coma, chronic ([facility] History of alcohol abuse, History of blood transfusion, History of colon polyps, History of tobacco abuse, [place] ([place]), Hyperlipidemia, Hypertension, Hyponatremia, Hypotension, PONV (postoperative nausea and vomiting), Port-A-Cath in place, Portal hypertension ([address] Sciatica, Secondary esophageal varices ([address] Shortness of breath, Spinal stenosis, Stomach ulcer, Thrombocytopenia ([address] Tubular adenoma of colon, Vitamin D deficiency, and Wears glasses. Social History:   reports that he quit smoking about 5 years ago. His smoking use included cigarettes. He has a 45.00 pack-year smoking history. He has never used smokeless tobacco. He reports that he does not currently use alcohol. He reports that he does not currently use drugs after having used the following drugs: Cocaine. Frequency: 1.00 time per week. Family History:   Family History   Problem Relation Age of Onset    Cancer Mother         pancreatic    Cancer Father         bone    Diabetes Sister     Asthma Brother     Allergies Brother         MULTIPLE       Vitals:  BP (!) 135/95   Pulse 92   Temp 97.3 °F (36.3 °C) (Temporal)   Resp 18   Ht 5' 10\" (1.778 m)   Wt 190 lb (86.2 kg)   SpO2 96%   BMI 27.26 kg/m²   Temp (24hrs), Av.3 °F (36.8 °C), Min:96.8 °F (36 °C), Max:99.6 °F (37.6 °C)    Recent Labs     22  0544   POCGLU 79       I/O (24Hr):     Intake/Output Summary (Last 24 hours) at 2022 1657  Last data filed at 2022 1623  Gross per 24 hour   Intake 300 ml   Output 400 ml   Net -100 ml       Labs:  Hematology:  Recent Labs     22  1734 22  0601 22  0910 22  2356 22  0544 22  1410 22  1856 22  0135 22  0556   WBC 3.6  --   --  4.4  --   --   --  5.7 5.7   RBC 2.12*  --   --  2.79*  --   --   --  3.44* 3.37*   HGB 5.0* 6.2*   < > 7.0* 7.2*   < > 9.0* 8.7* 8.6*   HCT 16.6* 20.5*   < > 23.1* 24.5*   < > 30.3* 28.9* 28.9*   MCV 78.3*  --   --  82.8  --   --   --  84.0 85.8   MCH 23.4*  --   --  25.1*  --   --   --  25.3 25.5   MCHC 29.9*  --   --  30.3  --   --   -- 30.1 29.8   RDW 18.4*  --   --  17.2*  --   --   --  18.3* 18.9*   PLT 74*  --    < > See Reflexed IPF Result  --   --   --  88* 90*   MPV 7.2  --   --   --   --   --   --  9.9 9.8   CRP  --   --   --   --   --   --   --   --  19.5*   INR  --  1.4  --   --  1.3  --   --   --   --     < > = values in this interval not displayed. Chemistry:  Recent Labs     12/27/22 1734 12/28/22  0601 12/28/22  2356 12/29/22  0544 12/30/22  0135 12/30/22  0556 12/30/22  1106   *   < > 130* 132* 135 135  --    K 3.8   < > 3.5* 3.5* 3.5* 3.6*  --    CL 98   < > 98 100 102 101  --    CO2 21   < > 22 19* 18* 17*  --    GLUCOSE 102*   < > 86 79 84 82  --    BUN 10   < > 11 11 12 11  --    CREATININE 0.53*   < > 0.65* 0.61* 0.67* 0.72  --    MG 1.5*  --  1.6 1.5*  --   --   --    ANIONGAP 10   < > 10 13 15 17  --    LABGLOM >60   < > >60 >60 >60 >60  --    CALCIUM 7.5*   < > 7.9* 7.6* 7.9* 7.8*  --    PHOS  --   --   --   --   --  3.2  --    PROBNP  --   --   --   --   --  556*  --    LACTACIDWB  --   --   --   --   --   --  1.1    < > = values in this interval not displayed. Recent Labs     12/27/22 1734 12/30/22 0135 12/30/22  0544 12/30/22  0556   PROT 4.5* 4.6*  --  4.7*   LABALBU 2.3* 2.5*  --  2.7*   AST 45* 36  --  38   ALT 16 13  --  14   ALKPHOS 209* 144*  --  141*   BILITOT 2.3* 4.7*  --  4.7*   AMMONIA 41 53  --   --    LIPASE 18  --   --   --    POCGLU  --   --  79  --      ABG:  Lab Results   Component Value Date/Time    FIO2 INFORMATION NOT PROVIDED 12/30/2022 01:35 AM     Lab Results   Component Value Date/Time    SPECIAL NOT REPORTED 02/02/2022 11:28 AM     Lab Results   Component Value Date/Time    CULTURE NO GROWTH 2 DAYS 12/28/2022 03:45 PM       Radiology:  XR CHEST (SINGLE VIEW FRONTAL)    Result Date: 12/27/2022  1. Minimal bibasilar atelectatic changes with trace left pleural fluid which is slightly decreased from 12/09/2022.      CT ABDOMEN PELVIS W IV CONTRAST Additional Contrast? None    Result Date: 12/27/2022  Stable circumferential wall thickening of the distal esophagus and gastroesophageal junction consistent with known esophageal malignancy. No significant change since prior examination. No esophageal obstruction. Cirrhotic liver with associated portal venous hypertension, progressive ascites and stable splenomegaly. TIPS appears in place and patent Cholelithiasis Interval development of bibasal infiltrates and moderate bilateral pleural effusions as well as mild-to-moderate pericardial effusion     IR FLUORO GUIDED NEEDLE PLACEMENT    Result Date: 12/28/2022  Successful ultrasound-guided placement of a right upper extremity midline venous catheter. US LIVER    Result Date: 12/28/2022  1. Cholelithiasis without definite findings of acute cholecystitis. 2. Patent TIPS. Specific velocities recorded above. 3. Small volume abdominal ascites. 4. Hepatic morphologic features typical of cirrhosis. US GALLBLADDER RUQ    Result Date: 12/30/2022  Multiple cholelithiasis but no ultrasound evidence cholecystitis. Nondilated biliary tree. Cirrhosis. Patent TIPS. Ascites. Additional findings, as above. XR CHEST PORTABLE    Result Date: 12/30/2022  Cardiomegaly with probable mild central vascular congestion and similar bilateral pleural effusions with compressive atelectasis; filtrate at either base a consideration. No pulmonary edema. IR US GUIDED PARACENTESIS    Result Date: 12/28/2022  Successful ultrasound-guided therapeutic and diagnostic paracentesis.        Physical Examination:        General appearance: Patient sleeping arousable with bilateral soft restraints  Mental Status: Sleeping unable to assess  Lungs: Tattered rhonchi on deep inspiratory auscultation bilaterally, normal effort  Heart:  regular rate and rhythm, no murmur  Abdomen:  soft, t slight tender in the right upper quadrant as evident by his furrowing of eye browson palpation, distended, normal bowel sounds, no masses, hepatomegaly, splenomegaly  Extremities:  no edema, redness, tenderness in the calves  Skin:  no gross lesions, rashes, induration    Assessment:        Hospital Problems             Last Modified POA    Ascites due to alcoholic cirrhosis ([address] 30/79/3430 Yes    Shortness of breath 12/29/2022 Yes    Portal hypertension ([address] 12/29/2022 Yes    Esophageal varices with bleeding ([address] 12/29/2022 Yes    Other abnormalities of gait and mobility 12/29/2022 Yes    S/P TIPS (transjugular intrahepatic portosystemic shunt) 12/29/2022 Yes    Acute blood loss anemia 12/29/2022 Yes    Near syncope 12/29/2022 Yes    Bleeding gastric varices 12/29/2022 Yes    Hepatic encephalopathy 83/38/5168 Yes    Periumbilical abdominal pain 12/29/2022 Yes    Lezama's esophagus with dysplasia 12/30/2022 Yes    Acute hyperactive alcohol withdrawal delirium ([address] 12/30/2022 Yes    Decompensated hepatic cirrhosis ([address] 12/29/2022 Yes    DDD (degenerative disc disease), lumbar 12/29/2022 Yes    Acute upper GI hemorrhage 12/29/2022 Yes    Gastrointestinal hemorrhage with melena 12/29/2022 Yes   59-year-old gentleman with known history of chronic alcohol abuse admitted with acute GI bleed with Lezama's esophagus Lezama's esophagus/esophageal cancer esophageal cancer with upper endoscopy revealing moderately large varices with moderate portal hypertensive gastropathy. Status post therapeutic paracentesis 4.5 L with positive leukocytosis on IV Zosyn for SBP prophylaxis. 3 units blood transfusion. Chest x-ray showed bilateral pneumonia with mild to moderate pericardial effusion with recent echocardiogram showing no confirmation. IV antibiotics changed from Zosyn to cefepime and vancomycin for aspiration pneumonia coverage with possible complication from Pseudomonas due to his esophageal cancer history. Elevated CRP 19.5.   Elevated total bilirubin 4.7 for further work-up for biliary obstruction with pending HIDA scan and bilirubin fractionation. Cholelithiasis. Episode of fall with decreased mentation with pending CT of head and neck. Mildly elevated ammonia level. Principal diagnosis  #History of GI bleed secondary to portal hypertension with alcoholic cirrhosis with moderate to large varices requiring 3 units of blood with restrictive transfusion to maintain hemoglobin at 8. Placed on IV Zosyn for SBP prophylaxis for ascites removed 4.5 L with therapeutic paracentesis. Status post revision of TIPS    Active diagnoses    #Bilateral basilar pneumonia with high risk for aspiration with consideration for Pseudomonas pneumonia. Change IV Zosyn to IV cefepime and vancomycin with pharmacy to dose pending MRSA of nose. Elevated CRP 19.5. Pending procalcitonin level. Follow-up chest x-ray. Sputum culture and sensitivity. elevated respiration 33/min pulse 124/min    #Elevated bilirubin total bilirubin 4.7 with suspected right upper quadrant pain on palpation. Pending ultrasound of gallbladder. Direct bilirubin alkaline phosphatase HIDA scan on IV cefepime GI consulted  #Elevated ammonia level-lactulose. #History of unwitnessed fall with decreased mentation-stat CT of head and C-spine to rule out intracranial bleed  #Suggestive protein calorie malnutrition with total protein 4.7, albumin 2.7.-Enteral hyperalimentation. #Chronic alcohol abuse-currently on Ativan for withdrawal with visual hallucinations drinking 6 beers a day for the last 50 years. Continue CIWA score monitor patient closely. Plan:        Stat CT of head/C-spine. 2.  Labs ordered in the morning repeat chest x-ray. 3.  Sputum collected culture and sensitivity with change in antibiotics to IV cefepime/IV vancomycin pharmacy to dose with follow-up CRP level. 4.  Monitor mentation with neurological surveillance    Condition guarded. Full code resuscitation.     Jose Juan Velazquez MD  12/30/2022  4:57 PM

## 2022-12-30 NOTE — BRIEF OP NOTE
Brief Postoperative Note    Maricruz Rangel  YOB: 1960  [id number]    Pre-operative Diagnosis: Continued UGI bleeding and increasing ascites s/p TIPS. Post-operative Diagnosis: Same    Procedure: TIPS recheck + pressure measurements, PTA Via torr stent PTA to 10 mm, embolization left gastric vein and splenoportography with assessment posterior gastric veins. Medications Given: general anesthesia    Anesthesia: Local    Surgeons/Assistants: Kath Vang MD    Estimated Blood Loss: minimal    Complications: none    Specimens: were not obtained    Findings: Portosystemic gradient 6 mmHg; no filling of gastric varices on tripp splenogram. Stent PTA'd to 10 mm. Left gastric vein embolized. Posterior gastric venography shows no filling large gastric varices. If patient continues to bleed could attempt completion BATO.  Discussed with Dr Michaela Osborne.      Electronically signed by Kath Vang MD on 12/30/2022 at 5:46 PM

## 2022-12-30 NOTE — PROGRESS NOTES
Patient threatening to leave AMA if he cannot have water. NPO status declined. Patient wants to leave to get clothes. In-house Physician at bedside.

## 2022-12-30 NOTE — PROGRESS NOTES
[facility]  Office: [address], DO, Sammy Wallace, DO, Wily Michoacano, DO, Juan C Sanchez, DO, Marcos Ledezma MD, Shari Lezama MD, Tatiana Guido MD, Alexei Horner MD,  Camryn So MD, Josué Alvarenga MD, Lindsay Naik, DO, Rhea Hernandez MD,  Juancarlos Lord MD, Ellie Crowell MD, Logan Torres DO, Mirta Mckeon MD, Jesus Javier MD, Fazal Carrillo, DO, Charity Roman MD, Yohan Najera MD, Ritesh Allen MD, Paul Martini MD, Karl Ying DO, Zulma Waldron MD, Kathleen Curtis MD, Romayne Muscat, CNP,  Ana Graves, CNP, Tip Ramirez, CNP, Aaron Connolly, CNP,  Nubia Burns, [facility], Manish Phillips, CNP, Maye Gerber, CNP, Jose Orozco, CNP, Osmel Palacio, CNP, Rebecca Charles, CNP, Julianne Mercedes PA-C, Nubia Loyd, DOREEN, Zion Argueta CNP, Bhavik East, CNP         [facility]   Nighttime In-House Provider      12/30/2022    6:45 AM    Name:   Jeffrey Nelson  MRN:     [id number]     [place]:      [de-identified]   Room:   [address] [place] [address] Day:  2  Admit Date:  12/28/2022 11:30 PM    PCP:   VASU Salinas  Code Status:  Full Code    Called to the floor by staff to evaluate Jeffrey Nelson in [id number] at 5:30 AM with nursing concern regarding worsening agitation, status post fall out of bed. Found by nursing on floor. Status post soft restraints in upper extremities with worsening agitation and confusion. Further discussion with ex-wife Per nursing patient has not been sober since discharge, ongoing alcohol. Status post multiple doses of Ativan in addition to prior Zyprexa earlier today with persistent agitation    Assessment/Plan:   Acute metabolic encephalopathy likely related to acute alcohol withdrawal with delirium/agitation.   Labs otherwise stable  Acute upper GI bleed with esophageal and gastric varix bleed status post prior TIPS  Acute blood loss anemia with hemorrhage  Decompensated cirrhosis  Chronic alcohol dependence    Continue CIWA protocol with as needed Ativan. Prior labs reviewed from earlier this morning . continue Zyprexa as needed to reduce agitation and improve compliance with treatment. Hopefully wean off soft wrist restraints as sensorium improves/less agitated with medications with chemical sedation. Discussed with nursing status post fall, currently moving all 4 extremities spontaneously exam nonfocal but patient extremely agitated and thrashing around in his bed despite soft restraints. Unfortunately do not think CT brain would be possible or useful at this point with his inability to cooperate. Low threshold for further CT imaging if sensorium declines. Continue neurochecks every 4 hours. Consider transfer to ICU if persistent worsening agitation, may require Precedex drip. Discussed with nursing. Due to the immediate potential for life-threatening deterioration due to acute agitation with metabolic delirium/acute DTs status post fall out of bed, I spent 34 minutes providing critical care. This time is excluding time spent performing procedures.       Additional medical information reviewed:    Physical Examination:        Physical Exam  General sitting up in bed agitated thrashing arms and legs in bed despite soft restraints of upper extremities yelling at staff, remains confused  Cardiovascular tachycardic regular rhythm  Lungs tachypneic, nonlabored breathing  GI soft nontender  Neuro nonfocal moving all 4 extremities spontaneously    Problem List:        Hospital Problems             Last Modified POA    Acute upper GI hemorrhage 12/29/2022 Yes    Esophageal varices with bleeding ([address] 12/29/2022 Yes    S/P TIPS (transjugular intrahepatic portosystemic shunt) 12/29/2022 Yes    Acute blood loss anemia 12/29/2022 Yes    Bleeding gastric varices 12/29/2022 Yes    Ascites due to alcoholic cirrhosis ([address] 45/89/3379 Yes    Shortness of breath 12/29/2022 Yes    Portal hypertension ([facility] 12/29/2022 Yes    Other abnormalities of gait and mobility 12/29/2022 Yes    Near syncope 12/29/2022 Yes    Hepatic encephalopathy 87/04/7572 Yes    Periumbilical abdominal pain 12/29/2022 Yes    Decompensated hepatic cirrhosis ([facility] 12/29/2022 Yes    DDD (degenerative disc disease), lumbar 12/29/2022 Yes    Gastrointestinal hemorrhage with melena 12/29/2022 Yes       Medications:      Allergies:  No Known Allergies    Current Meds:   Scheduled Meds:    sodium chloride flush  5-40 mL IntraVENous 2 times per day    thiamine  100 mg Oral Daily    piperacillin-tazobactam  3,375 mg IntraVENous Q8H    sodium chloride flush  5-40 mL IntraVENous 2 times per day     Continuous Infusions:    sodium chloride      sodium chloride      sodium chloride 75 mL/hr at 12/30/22 0555    octreotide (SandoSTATIN) infusion 25 mcg/hr (12/30/22 0630)    pantoprazole 8 mg/hr (12/30/22 0220)     PRN Meds: OLANZapine (ZyPREXA) in sterile water IntraMUSCular, sodium chloride flush, sodium chloride, LORazepam **OR** LORazepam **OR** LORazepam **OR** LORazepam **OR** LORazepam **OR** LORazepam **OR** LORazepam **OR** LORazepam, potassium chloride, magnesium sulfate, diphenhydrAMINE, sodium chloride, sodium chloride flush, ondansetron **OR** ondansetron    Data:     Past Medical History:   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 ([facility] Alcoholic cirrhosis of liver with ascites ([facility] Anemia, Anxiety, Arthritis, Back pain, chronic, Lezama esophagus, BPH (benign prostatic hypertrophy), Cholelithiasis, Cirrhosis ([facility] COVID-19, COVID-19 vaccine series completed, DDD (degenerative disc disease), lumbar, Depression, Esophageal cancer ([facility] Esophageal varices ([facility] Fatty liver, GERD (gastroesophageal reflux disease), GI bleed, Hep C w/o coma, chronic ([facility] History of alcohol abuse, History of blood transfusion, History of colon polyps, History of tobacco abuse, [place] (hard of hearing), Hyperlipidemia, Hypertension, Hyponatremia, Hypotension, PONV (postoperative nausea and vomiting), Port-A-Cath in place, Portal hypertension ([address] Sciatica, Secondary esophageal varices ([address] Shortness of breath, Spinal stenosis, Stomach ulcer, Thrombocytopenia ([address] Tubular adenoma of colon, Vitamin D deficiency, and Wears glasses. Vitals:  BP (!) 140/84   Pulse (!) 124   Temp 99 °F (37.2 °C) (Temporal)   Resp (!) 33   Ht 5' 10\" (1.778 m)   Wt 190 lb (86.2 kg)   SpO2 95%   BMI 27.26 kg/m²   Temp (24hrs), Av.2 °F (36.8 °C), Min:96.8 °F (36 °C), Max:99 °F (37.2 °C)    Recent Labs     22  0544   POCGLU 79       I/O (24Hr): Intake/Output Summary (Last 24 hours) at 2022 0645  Last data filed at 2022 0320  Gross per 24 hour   Intake 339.58 ml   Output 200 ml   Net 139.58 ml       Labs:    [unfilled]    Lab Results   Component Value Date/Time    SPECIAL NOT REPORTED 2022 11:28 AM     Lab Results   Component Value Date/Time    CULTURE NO GROWTH 11 HOURS 2022 03:45 PM       [unfilled]    Radiology:    XR CHEST (SINGLE VIEW FRONTAL)    Result Date: 2022  1. Minimal bibasilar atelectatic changes with trace left pleural fluid which is slightly decreased from 2022. CT ABDOMEN PELVIS W IV CONTRAST Additional Contrast? None    Result Date: 2022  Stable circumferential wall thickening of the distal esophagus and gastroesophageal junction consistent with known esophageal malignancy. No significant change since prior examination. No esophageal obstruction. Cirrhotic liver with associated portal venous hypertension, progressive ascites and stable splenomegaly.   TIPS appears in place and patent Cholelithiasis Interval development of bibasal infiltrates and moderate bilateral pleural effusions as well as mild-to-moderate pericardial effusion     IR FLUORO GUIDED NEEDLE PLACEMENT    Result Date: 2022  Successful ultrasound-guided placement of a right upper extremity midline venous catheter. US LIVER    Result Date: 12/28/2022  1. Cholelithiasis without definite findings of acute cholecystitis. 2. Patent TIPS. Specific velocities recorded above. 3. Small volume abdominal ascites. 4. Hepatic morphologic features typical of cirrhosis. IR US GUIDED PARACENTESIS    Result Date: 12/28/2022  Successful ultrasound-guided therapeutic and diagnostic paracentesis. IR US GUIDED PARACENTESIS    Result Date: 12/23/2022  Successful ultrasound-guided paracentesis.        Wolfgang Joshua MD  12/30/2022  6:45 AM

## 2022-12-30 NOTE — ANESTHESIA PRE PROCEDURE
Department of Anesthesiology  Preprocedure Note       Name:  Sandy Alfaro   Age:  60 y.o.  :  1960                                          MRN:  [id number]         Date:  2022      Surgeon: * No surgeons listed *    Procedure: TIPS revision    Medications prior to admission:   Prior to Admission medications    Medication Sig Start Date End Date Taking? Authorizing Provider   diphenhydrAMINE (BENADRYL) 25 MG tablet Take 1 tablet by mouth every 6 hours as needed for Itching  Patient not taking: No sig reported 22  Bri COLE Blood, DO   pantoprazole (PROTONIX) 40 MG tablet Take 40 mg by mouth daily 10/4/22   Historical Provider, MD   FEROSBRAN 325 (65 Fe) MG tablet take 1 tablet by mouth twice a day 10/7/22   VASU Browning   nadolol (CORGARD) 20 MG tablet take 1 tablet by mouth once daily 22   MASSIMO Arias NP   midodrine (PROAMATINE) 5 MG tablet Take 2 tablets by mouth in the morning and 2 tablets at noon and 2 tablets before bedtime.   Patient not taking: No sig reported 22   MASSIMO Arias NP   furosemide (LASIX) 20 MG tablet Take 1 tablet by mouth 2 times daily 22   MASSIMO Arias NP   spironolactone (ALDACTONE) 100 MG tablet Take 1 tablet by mouth every evening 22   MASSIMO Arias NP   ondansetron (ZOFRAN-ODT) 4 MG disintegrating tablet dissolve 1 tablet by mouth every 8 hours if needed for nausea and vomiting  Patient not taking: No sig reported 22   VASU Browning   lactulose [facility]) 10 GM/15ML solution take 30 milliliters by mouth three times a day 22   VASU Browning   FLUoxetine (PROZAC) 20 MG capsule Take 1 capsule by mouth daily 22   Sybil Pride MD   atorvastatin (LIPITOR) 20 MG tablet Take 1 tablet by mouth nightly 22   Sybil Pride MD       Current medications:    Current Facility-Administered Medications   Medication Dose Route Frequency Provider Last Rate Last Admin  OLANZapine (ZYPREXA) 2.5 mg in sterile water 0.5 mL injection  2.5 mg IntraMUSCular BID PRN Noa Granados MD   2.5 mg at 12/30/22 0141    sodium chloride flush 0.9 % injection 5-40 mL  5-40 mL IntraVENous 2 times per day Noa Granados MD        sodium chloride flush 0.9 % injection 5-40 mL  5-40 mL IntraVENous PRN Noa Granados MD        0.9 % sodium chloride infusion   IntraVENous PRN Noa Granados MD        thiamine tablet 100 mg  100 mg Oral Daily Noa Granados MD   100 mg at 12/30/22 0087    LORazepam (ATIVAN) tablet 1 mg  1 mg Oral Q1H PRN Noa Granados MD        Or    LORazepam (ATIVAN) injection 1 mg  1 mg IntraVENous Q1H PRN Noa Granados MD        Or    LORazepam (ATIVAN) tablet 2 mg  2 mg Oral Q1H PRN Noa Granados MD        Or    LORazepam (ATIVAN) injection 2 mg  2 mg IntraVENous Q1H PRN Noa Granados MD        Or    LORazepam (ATIVAN) tablet 3 mg  3 mg Oral Q1H PRN Noa Granados MD        Or    LORazepam (ATIVAN) injection 3 mg  3 mg IntraVENous Q1H PRN Noa Granados MD        Or    LORazepam (ATIVAN) tablet 4 mg  4 mg Oral Q1H PRN Noa Granados MD        Or    LORazepam (ATIVAN) injection 4 mg  4 mg IntraVENous Q1H PRN Noa Granados MD   4 mg at 12/30/22 8383    baclofen (LIORESAL) tablet 10 mg  10 mg Oral TID Myra Sherwood MD        piperacillin-tazobactam (ZOSYN) 3,375 mg in dextrose 5 % 50 mL IVPB extended infusion (mini-bag)  3,375 mg IntraVENous Parul Coello MD 12.5 mL/hr at 12/30/22 1231 3,375 mg at 12/30/22 1231    potassium chloride 10 mEq/100 mL IVPB (Peripheral Line)  10 mEq IntraVENous PRN Missy Mar MD        magnesium sulfate 2000 mg in 50 mL IVPB premix  2,000 mg IntraVENous PRN Missy Mar MD        diphenhydrAMINE (BENADRYL) injection 25 mg  25 mg IntraVENous Q6H PRN Missy Mar MD   25 mg at 12/29/22 0938    0.9 % sodium chloride infusion   IntraVENous PRN Nilson Joseph MD        0.9 % sodium chloride infusion   IntraVENous Continuous Darion Read, APRN - CNS 75 mL/hr at 12/30/22 0555 New Bag at 12/30/22 0555    sodium chloride flush 0.9 % injection 5-40 mL  5-40 mL IntraVENous 2 times per day Nilson Joseph MD   10 mL at 12/29/22 0900    sodium chloride flush 0.9 % injection 5-40 mL  5-40 mL IntraVENous PRN Nilson Joseph MD        ondansetron (ZOFRAN-ODT) disintegrating tablet 4 mg  4 mg Oral Q8H PRN Nilson Joseph MD   4 mg at 12/29/22 0016    Or    ondansetron (ZOFRAN) injection 4 mg  4 mg IntraVENous Q6H PRN Nilson Joseph MD        octreotide (SANDOSTATIN) 500 mcg in sodium chloride 0.9 % 100 mL infusion  25 mcg/hr IntraVENous Continuous Nilson Joseph MD 5 mL/hr at 12/30/22 0630 25 mcg/hr at 12/30/22 0630    pantoprazole (PROTONIX) 80 mg in sodium chloride 0.9 % 100 mL infusion  8 mg/hr IntraVENous Continuous Nilson Joseph MD 10 mL/hr at 12/30/22 4523 8 mg/hr at 12/30/22 6233       Allergies:  No Known Allergies    Problem List:    Patient Active Problem List   Diagnosis Code    Back pain, chronic M54.9, G89.29    Hearing difficulty H91.90    GERD (gastroesophageal reflux disease) K21.9    Cervical radicular pain M54.12    Acute hyperactive alcohol withdrawal delirium (HCC) F10.931    Hypomagnesemia E83.42    Chronic viral hepatitis B without delta agent and without coma (HCC) B18.1    Calculus of gallbladder without cholecystitis K80.20    Hep C w/o coma, chronic (HCC) B18.2    Fatty liver K76.0    Psychophysiologic insomnia F51.04    Cirrhosis ([facility] [address] [facility] K74.60    Decompensated hepatic cirrhosis (HCC) K72.90, K74.60    Vertebrogenic low back pain M54.51    DDD (degenerative disc disease), lumbar M51.36    Depression F32. A    Tubular adenoma of colon D12.6    History of colon polyps Z86.010    Gynecomastia, male N58    Lumbar radiculitis M54.16    Lumbar disc herniation M51.26    Tinnitus H93.19  Eustachian tube dysfunction H69.80    Ganglion cyst M67.40    Carpal tunnel syndrome of right wrist G56.01    History of hepatitis C Z86.19    Vitamin D deficiency E55.9    Pure hypercholesterolemia E78.00    Hypokalemia E87.6    Essential hypertension I10    Recurrent major depressive disorder in partial remission (HCC) F33.41    S/P epidural steroid injection Z92.241    Elevated LFTs R79.89    Seasonal allergies J30.2    Lumbar facet arthropathy M47.816    Cervical facet syndrome M47.812    Acute upper GI hemorrhage K92.2    Thrombocytopenia (HCC) D69.6    Hepatitis C virus infection resolved after antiviral drug therapy Z86.19    Gastrointestinal hemorrhage with melena K92.1    Alcohol abuse F10.10    Altered mental status R41.82    Hypocalcemia E83.51    Hypophosphatemia E83.39    Malignant neoplasm of lower third of esophagus (HCC) C15.5    COVID-19 U07.1    Anxiety F41.9    Current smoker F17.200    Severe comorbid illness R69    Gait instability R26.81    Abnormal findings on diagnostic imaging of spine R93.7    Cervical spinal stenosis M48.02    Spinal stenosis of lumbar region with neurogenic claudication M48.062    Low hemoglobin D64.9    Symptomatic anemia, microcytic, acute D64.9    Hypotension I95.9    Former smoker, 50+ pack years, quit 2016 Z87.891    HLD (hyperlipidemia) E78.5    Esophageal adenocarcinoma (HCC) C15.9    Anemia D64.9    Acute kidney injury ([facility] [address] N17.9    Ascites due to alcoholic cirrhosis (HCC) P95.67    Shortness of breath R06.02    Drop in hemoglobin R71.0    Portal hypertension (HCC) K76.6    Esophageal varices with bleeding (HCC) I85.01    Esophageal polyp K22.81    Acute kidney failure, unspecified (HCC) N17.9    Muscle weakness (generalized) M62.81    Other abnormalities of gait and mobility R26.89    GI bleed K92.2    Goals of care, counseling/discussion Z71.89    ACP (advance care planning) Z71.89    Palliative care encounter Z51.5    S/P TIPS (transjugular intrahepatic portosystemic shunt) Z95.828    Acute blood loss anemia D62    Near syncope R55    Bleeding gastric varices I86.4    Hepatic encephalopathy R87.44    Periumbilical abdominal pain R10.33    Lezama's esophagus with dysplasia K22.719       Past Medical History:        Diagnosis Date    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 ([address]     Alcoholic cirrhosis of liver with ascites ([address]     Anemia 04/13/2022    Anxiety     Arthritis     Back pain, chronic     dr. Hari Downs, orthopedic, every 3-4 months, gets steroid injection    Lezama esophagus     BPH (benign prostatic hypertrophy)     Cholelithiasis     Cirrhosis ([address]     COVID-19 12/2020    pt reports he had a positive test while [facility] in [facility], was asymptomatic    COVID-19 vaccine series completed 5/20/2021, 6/22/2021    Moderna 5/20/2021, 6/22/2021    DDD (degenerative disc disease), lumbar     Depression     Esophageal cancer ([address]     INVASIVE ADENOCARCINOMA ARISING IN TUBULAR ADENOMA WITH HIGH GRADE DYSPLASIA, ASSOCIATED WITH FOCAL INTESTINAL METAPLASIA     Esophageal varices ([address]     Fatty liver     GERD (gastroesophageal reflux disease)     GI bleed     Hep C w/o coma, chronic ([address]     History of alcohol abuse     6-12 beers a day; quit drinking 2019    History of blood transfusion     History of colon polyps 2016    History of tobacco abuse     [place] (hard of hearing)     Hyperlipidemia     Hypertension     Hyponatremia 07/20/2016    Hypotension 12/20/2021    PONV (postoperative nausea and vomiting)     Port-A-Cath in place     right upper chest    Portal hypertension ([address]     Sciatica     Secondary esophageal varices ([address] 06/07/2022    Shortness of breath     Spinal stenosis     Stomach ulcer     hx of    Thrombocytopenia ([address] 12/23/2020    Tubular adenoma of colon 2016, 2018    Vitamin D deficiency     Wears glasses        Past Surgical History:        Procedure Laterality Date    BUNIONECTOMY      twice on right side    BUNIONECTOMY Left     CARPAL TUNNEL RELEASE Right     COLONOSCOPY      at age 35    COLONOSCOPY  10/05/2016    polyps-pathology tubular adenoma, and abnormal looking mucosa right colon-pathology-tubular adenoma    COLONOSCOPY N/A 03/30/2018    COLONOSCOPY POLYPECTOMY COLD BIOPSY performed by Shikha Horta MD at [address]  03/30/2018    Small polyp in the sigmoid colon and excised with biopsy forceps--tubular adenoma    COLONOSCOPY N/A 04/16/2022    COLONOSCOPY POLYPECTOMY performed by Shikha Horta MD at [address], COLON, DIAGNOSTIC      EGD    ESOPHAGOGASTRODUODENOSCOPY  12/29/2022    IR PORT PLACEMENT EQUAL OR GREATER THAN 5 YEARS  04/19/2021    IR PORT PLACEMENT EQUAL OR GREATER THAN 5 YEARS 4/19/2021 [facility]    IR TIPS INSERTION  12/01/2022    IR TIPS INSERTION 12/1/2022 Shelli Aceves MD [facility]    KNEE SURGERY Left     cyst removed    NASAL SEPTUM SURGERY      NERVE BLOCK Right 11/23/2020    NERVE BLOCK RIGHT CERVICAL STEROID INJECTION  C3-C6 performed by Dori Mukherjee MD at [address] [place] [address]  01/04/2016    steroid injection C7 T1    OTHER SURGICAL HISTORY  11/21/2016    Bilateral Lumbar CACHORRO L4-L5 injections    OTHER SURGICAL HISTORY  12/19/2016    lumbar steroid injection    OTHER SURGICAL HISTORY  09/28/2018    BILATERAL L5 CACHORRO (N/A Back)    OTHER SURGICAL HISTORY Right 11/23/2020    cervical injection    PAIN MANAGEMENT PROCEDURE Left 07/09/2020    EPIDURAL STEROID INJECTION LEFT L4 L5 performed by Dori Mukherjee MD at [address] [place] [address] Left 07/20/2020    LEFT L4 L5 EPIDURAL STEROID INJECTION performed by Dori Muhkerjee MD at [address] [place] [address] Bilateral 08/17/2020    LUMBAR FACET BILATERAL L2-L5 performed by Dori Mukherjee MD at [address] [place] [address] Bilateral 12/07/2020    NERVE BLOCK BILATERAL LUMBAR MEDIAL BRANCH L2-L5 performed by Ermelinda Joseph MD at [address]      Multiple    [place] [address] AA&/STRD TFRML EPI LUMBAR/SACRAL 1 LEVEL Bilateral 09/06/2018    BILATERAL L5 CACHORRO performed by Ermelinda Joseph MD at [facility] AA&/STRD TFRML EPI LUMBAR/SACRAL 1 LEVEL N/A 09/28/2018    BILATERAL L5 CACHORRO performed by Ermelinda Joseph MD at [address] [place] [address] N/CARPAL TUNNEL SURG Right 08/29/2017    CARPAL TUNNEL RELEASE RIGHT performed by Reinier Howe MD at [address] [place] [address] N/CARPAL TUNNEL SURG Left 10/31/2017    CARPAL TUNNEL RELEASE performed by Reinier Howe MD at [address] [place] [address] 12/29/2020    EGD BIOPSY performed by Felix Parikh MD at [address] [place] [address] 02/02/2021    EGD BIOPSY and spot marking performed by Hemant Capellan MD at [address] [place] [address] 02/12/2021    ENDOSCOPIC ULTRASOUND, EGD performed by Rk Dimas MD at [place] [address]  02/12/2021    EGD DIAGNOSTIC ONLY performed by Rk Dimas MD at [place] [address] N/A 08/31/2021    EGD BIOPSY performed by Hemant Capellan MD at [address] [place] [address] 01/21/2022    EGD BIOPSY performed by Hemant Capellan MD at [address] [place] [address] 04/15/2022    EGD ESOPHAGOGASTRODUODENOSCOPY performed by Felix Parikh MD at [address] [place] [address] N/A 06/06/2022    EGD BAND LIGATION performed by Sarabjit Ortiz MD at [address] [place] [address] N/A 06/09/2022    EGD ESOPHAGOGASTRODUODENOSCOPY performed by Sarabjit Ortiz MD at [address] [place] [address] N/A 09/14/2022    EGD ESOPHAGOGASTRODUODENOSCOPY ENDOSCOPIC APC AT [address] performed by Osmin Pereyra MD at [address] [place] [address] 10/19/2022    EGD BIOPSY performed by Marietta Munoz MD at [address] [place] [address] N/A 10/31/2022    EGD with APC performed by Marietta Munoz MD at [address] [place] [address]  2022    EGD ESOPHAGOGASTRODUODENOSCOPY performed by Rosey Marmolejo MD at [id number] [place] [address]. S.W History:    Social History     Tobacco Use    Smoking status: Former     Packs/day: 1.00     Years: 45.00     Pack years: 45.00     Types: Cigarettes     Quit date: 2017     Years since quittin.9    Smokeless tobacco: Never   Substance Use Topics    Alcohol use: Not Currently     Comment: Quit alcohol in 2019- heavier drinking prior to quitting                                Counseling given: Not Answered      Vital Signs (Current):   Vitals:    22 0615 22 0815 22 1145 22 1303   BP: (!) 140/84 (!) 140/80 133/66 (!) 135/95   Pulse: (!) 124 (!) 113 95 92   Resp: (!) 33 21 17 18   Temp:  99.6 °F (37.6 °C) 98.8 °F (37.1 °C) 97.3 °F (36.3 °C)   TempSrc:  Temporal Temporal Temporal   SpO2: 95% 95% 96% 96%   Weight:    190 lb (86.2 kg)   Height:    5' 10\" (1.778 m)                                              BP Readings from Last 3 Encounters:   22 (!) 135/95   22 118/65   22 119/68       NPO Status: Time of last liquid consumption:                         Time of last solid consumption:                         Date of last liquid consumption: 22                        Date of last solid food consumption: 22    BMI:   Wt Readings from Last 3 Encounters:   22 190 lb (86.2 kg)   22 197 lb 5 oz (89.5 kg)   22 214 lb 15.2 oz (97.5 kg)     Body mass index is 27.26 kg/m².     CBC:   Lab Results   Component Value Date/Time    WBC 5.7 2022 05:56 AM    RBC 3.37 2022 05:56 AM    RBC 4.75 2012 11:30 AM    HGB 8.6 2022 05:56 AM    HCT 28.9 2022 05:56 AM    MCV 85.8 12/30/2022 05:56 AM    RDW 18.9 12/30/2022 05:56 AM    PLT 90 12/30/2022 05:56 AM     04/19/2012 11:30 AM       CMP:   Lab Results   Component Value Date/Time     12/30/2022 05:56 AM    K 3.6 12/30/2022 05:56 AM     12/30/2022 05:56 AM    CO2 17 12/30/2022 05:56 AM    BUN 11 12/30/2022 05:56 AM    CREATININE 0.72 12/30/2022 05:56 AM    GFRAA >60 09/28/2022 01:33 PM    LABGLOM >60 12/30/2022 05:56 AM    GLUCOSE 82 12/30/2022 05:56 AM    GLUCOSE 65 04/19/2012 11:30 AM    PROT 4.7 12/30/2022 05:56 AM    CALCIUM 7.8 12/30/2022 05:56 AM    BILITOT 4.7 12/30/2022 05:56 AM    ALKPHOS 141 12/30/2022 05:56 AM    AST 38 12/30/2022 05:56 AM    ALT 14 12/30/2022 05:56 AM       POC Tests:   Recent Labs     12/30/22  0544   POCGLU 79       Coags:   Lab Results   Component Value Date/Time    PROTIME 13.8 12/29/2022 05:44 AM    INR 1.3 12/29/2022 05:44 AM    APTT 29.0 12/29/2022 05:44 AM       HCG (If Applicable): No results found for: PREGTESTUR, PREGSERUM, HCG, HCGQUANT     ABGs: No results found for: PHART, PO2ART, QCU2SUK, YKL1MGE, BEART, O8MSBLOL     Type & Screen (If Applicable):  No results found for: LABABO, LABRH    Drug/Infectious Status (If Applicable):  Lab Results   Component Value Date/Time    HEPCAB REACTIVE 12/23/2020 05:09 PM       COVID-19 Screening (If Applicable):   Lab Results   Component Value Date/Time    COVID19 Not Detected 09/12/2022 10:32 PM    COVID19 Not Detected 07/17/2020 10:00 AM       TTE 12/2021  Left ventricle is normal in size. Mild left ventricular hypertrophy. Global left ventricular systolic function is normal. Estimated LV EF 60-65  %. Left atrium is mildly dilated. No significant valvular regurgitation or stenosis seen. No significant pericardial effusion is seen. Normal aortic root dimension.       Anesthesia Evaluation  Patient summary reviewed no history of anesthetic complications:   Airway: Mallampati: II  TM distance: >3 FB   Neck ROM: full  Mouth opening: > = 3 FB   Dental:          Pulmonary:normal exam    (+) shortness of breath: no interval change,                             Cardiovascular:    (+) hypertension: no interval change,       ECG reviewed  Rhythm: regular  Rate: normal                    Neuro/Psych:   (+) neuromuscular disease:, psychiatric history:depression/anxiety             GI/Hepatic/Renal:   (+) GERD: no interval change, PUD, hepatitis: C, liver disease: portal hypertension,           Endo/Other: Negative Endo/Other ROS   (+) blood dyscrasia: anemia:., .                 Abdominal:             Vascular: negative vascular ROS. Other Findings:             Anesthesia Plan      general     ASA 3       Induction: intravenous. Anesthetic plan and risks discussed with patient. Plan discussed with CRNA.                     Praveen Maher MD   12/30/2022

## 2022-12-30 NOTE — PROGRESS NOTES
[facility]  Office: [address], DO, Raquelgiulianobetsey Carolyn, DO, Ben Head, DO, Samy Rice Blood, DO, Tracy Sherman MD, Dominguez Ybarra MD, Ji Pozo MD, Lucie Briones MD,  Michelle lAexandra MD, Hollis Parson MD, Den Simpson, DO, Loren Frey MD,  Prasanna De Leon MD, Scot Weston MD, Sara Mayorga, DO, Omar Weeks MD, Chandler Lombard, MD, Chaitanya Nunez, DO, Bella Weldon MD, Milton Gilliland MD, Veda Morrison MD, Cristino Whitaker MD, Dennis Abebe, DO, Mara Kimble MD, Pa Guo MD, Olga Benz, Fernand Nyhan, CNP, Jamal Higuera, CNP, Riddhi Espino, CNP,  Teresa Montes, [facility], Calvin Sauceda, CNP, Philip Montemayor, CNP, Doc Patricio, CNP, Corbin Marshall, CNP, Agusto Howell, CNP, Fracisco Angel PA-C, Lucina Bowman, CNS, Deborah Ott, CNP, Kalyn Pillai, [address] [place] [address]    Progress Note    12/29/2022    8:33 PM    Name:   Genevieve Harrison  MRN:     [id number]     [place]:      [de-identified]   Room:   [address] [place] [address] Day:  1  Admit Date:  12/28/2022 11:30 PM    PCP:   VASU Menchaca  Code Status:  Full Code    Subjective:     C/C: History of GI bleed secondary to Lezama's esophagus/esophageal cancer with moderate portal hypertension self gastropathy and esophageal varices. Decompensated cirrhosis secondary to chronic alcohol use 6 beers a per day for the last 50 years. Status post therapeutic paracentesis 4.5 L positive leukocytosis on Zosyn, status post transfusion 3 units packed red blood cells electrolyte deficiencies serum sodium 132 magnesium 1.5. Interval History Status: improved. Brief History:   Status post upper endoscopy followed by GI.   Status post blood transfusion      Review of Systems:     Constitutional:  negative for chills, fevers, sweats  Respiratory:  negative for cough, dyspnea on exertion, shortness of breath, wheezing  Cardiovascular: negative for chest pain, chest pressure/discomfort, lower extremity edema, palpitations  Gastrointestinal:  negative for abdominal pain, constipation, diarrhea, nausea, vomiting  Neurological:  negative for dizziness, headache    Medications: Allergies:  No Known Allergies    Current Meds:   Scheduled Meds:    piperacillin-tazobactam  3,375 mg IntraVENous Q8H    sodium chloride flush  5-40 mL IntraVENous 2 times per day     Continuous Infusions:    sodium chloride      sodium chloride 100 mL/hr at 12/29/22 1851    octreotide (SandoSTATIN) infusion 25 mcg/hr (12/29/22 1848)    pantoprazole 8 mg/hr (12/29/22 1846)     PRN Meds: potassium chloride, magnesium sulfate, diphenhydrAMINE, sodium chloride, sodium chloride flush, ondansetron **OR** ondansetron    Data:     Past Medical History:   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 ([facility] [address] Alcoholic cirrhosis of liver with ascites ([address] Anemia, Anxiety, Arthritis, Back pain, chronic, Lezama esophagus, BPH (benign prostatic hypertrophy), Cholelithiasis, Cirrhosis ([address] COVID-19, COVID-19 vaccine series completed, DDD (degenerative disc disease), lumbar, Depression, Esophageal cancer ([address] Esophageal varices ([address] Fatty liver, GERD (gastroesophageal reflux disease), GI bleed, Hep C w/o coma, chronic ([address] History of alcohol abuse, History of blood transfusion, History of colon polyps, History of tobacco abuse, [place] (hard of hearing), Hyperlipidemia, Hypertension, Hyponatremia, Hypotension, PONV (postoperative nausea and vomiting), Port-A-Cath in place, Portal hypertension ([address] Sciatica, Secondary esophageal varices ([address] Shortness of breath, Spinal stenosis, Stomach ulcer, Thrombocytopenia ([address] Tubular adenoma of colon, Vitamin D deficiency, and Wears glasses. Social History:   reports that he quit smoking about 5 years ago. His smoking use included cigarettes. He has a 45.00 pack-year smoking history.  He has never used smokeless tobacco. He reports that he does not currently use alcohol. He reports that he does not currently use drugs after having used the following drugs: Cocaine. Frequency: 1.00 time per week. Family History:   Family History   Problem Relation Age of Onset    Cancer Mother         pancreatic    Cancer Father         bone    Diabetes Sister     Asthma Brother     Allergies Brother         MULTIPLE       Vitals:  /73   Pulse 95   Temp 97 °F (36.1 °C) (Temporal)   Resp 16   Ht 5' 10\" (1.778 m)   Wt 190 lb (86.2 kg)   SpO2 99%   BMI 27.26 kg/m²   Temp (24hrs), Av.1 °F (36.7 °C), Min:96.8 °F (36 °C), Max:98.9 °F (37.2 °C)    No results for input(s): POCGLU in the last 72 hours. I/O (24Hr): Intake/Output Summary (Last 24 hours) at 2022  Last data filed at 2022 1124  Gross per 24 hour   Intake 867.72 ml   Output 250 ml   Net 617.72 ml       Labs:  Hematology:  Recent Labs     22  1734 22  0601 22  0910 22  1432 22  2356 22  0544 22  1410 22  1856   WBC 3.6  --   --   --  4.4  --   --   --    RBC 2.12*  --   --   --  2.79*  --   --   --    HGB 5.0* 6.2*   < > 7.0* 7.0* 7.2* 8.1* 9.0*   HCT 16.6* 20.5*   < > 22.2* 23.1* 24.5* 27.1* 30.3*   MCV 78.3*  --   --   --  82.8  --   --   --    MCH 23.4*  --   --   --  25.1*  --   --   --    MCHC 29.9*  --   --   --  30.3  --   --   --    RDW 18.4*  --   --   --  17.2*  --   --   --    PLT 74*  --   --  78* See Reflexed IPF Result  --   --   --    MPV 7.2  --   --   --   --   --   --   --    INR  --  1.4  --   --   --  1.3  --   --     < > = values in this interval not displayed.      Chemistry:  Recent Labs     22  1734 22  0601 22  2356 22  0544   * 135 130* 132*   K 3.8 4.0 3.5* 3.5*   CL 98 102 98 100   CO2  22 22 19*   GLUCOSE 102* 81 86 79   BUN 10 12 11 11   CREATININE 0.53* 0.54* 0.65* 0.61*   MG 1.5*  --  1.6 1.5*   ANIONGAP 10 11 10 13   LABGLOM >60 >60 >60 >60 CALCIUM 7.5* 8.0* 7.9* 7.6*     Recent Labs     12/27/22  1734   PROT 4.5*   LABALBU 2.3*   AST 45*   ALT 16   ALKPHOS 209*   BILITOT 2.3*   AMMONIA 41   LIPASE 18     ABG:No results found for: POCPH, PHART, PH, POCPCO2, GXZ1ZXI, PCO2, POCPO2, PO2ART, PO2, POCHCO3, MXG8RYD, HCO3, NBEA, PBEA, BEART, BE, THGBART, THB, VSR9HZG, JNEU7RJT, L4YBJMMF, O2SAT, FIO2  Lab Results   Component Value Date/Time    SPECIAL NOT REPORTED 02/02/2022 11:28 AM     Lab Results   Component Value Date/Time    CULTURE NO GROWTH 11 HOURS 12/28/2022 03:45 PM       Radiology:  XR CHEST (SINGLE VIEW FRONTAL)    Result Date: 12/27/2022  1. Minimal bibasilar atelectatic changes with trace left pleural fluid which is slightly decreased from 12/09/2022. CT ABDOMEN PELVIS W IV CONTRAST Additional Contrast? None    Result Date: 12/27/2022  Stable circumferential wall thickening of the distal esophagus and gastroesophageal junction consistent with known esophageal malignancy. No significant change since prior examination. No esophageal obstruction. Cirrhotic liver with associated portal venous hypertension, progressive ascites and stable splenomegaly. TIPS appears in place and patent Cholelithiasis Interval development of bibasal infiltrates and moderate bilateral pleural effusions as well as mild-to-moderate pericardial effusion     IR FLUORO GUIDED NEEDLE PLACEMENT    Result Date: 12/28/2022  Successful ultrasound-guided placement of a right upper extremity midline venous catheter. US LIVER    Result Date: 12/28/2022  1. Cholelithiasis without definite findings of acute cholecystitis. 2. Patent TIPS. Specific velocities recorded above. 3. Small volume abdominal ascites. 4. Hepatic morphologic features typical of cirrhosis. IR US GUIDED PARACENTESIS    Result Date: 12/28/2022  Successful ultrasound-guided therapeutic and diagnostic paracentesis.      IR US GUIDED PARACENTESIS    Result Date: 12/23/2022  Successful ultrasound-guided paracentesis. Physical Examination:        General appearance:  alert, cooperative and no distress  Mental Status:  oriented to person, place and time and normal affect  Lungs:  clear to auscultation bilaterally, normal effort  Heart:  regular rate and rhythm, no murmur  Abdomen:  soft, +tender, distended, normal bowel sounds, no masses, +hepatomegaly, +splenomegaly  Extremities:  no edema, redness, tenderness in the calves  Skin:  no gross lesions, rashes, induration    Assessment:        Hospital Problems             Last Modified POA    Ascites due to alcoholic cirrhosis ([address] 71/10/2863 Yes    Shortness of breath 12/29/2022 Yes    Portal hypertension ([address] 12/29/2022 Yes    Esophageal varices with bleeding ([address] 12/29/2022 Yes    Other abnormalities of gait and mobility 12/29/2022 Yes    S/P TIPS (transjugular intrahepatic portosystemic shunt) 12/29/2022 Yes    Acute blood loss anemia 12/29/2022 Yes    Near syncope 12/29/2022 Yes    Bleeding gastric varices 12/29/2022 Yes    Hepatic encephalopathy 88/35/4824 Yes    Periumbilical abdominal pain 12/29/2022 Yes    Decompensated hepatic cirrhosis ([address] 12/29/2022 Yes    DDD (degenerative disc disease), lumbar 12/29/2022 Yes    Acute upper GI hemorrhage 12/29/2022 Yes    Gastrointestinal hemorrhage with melena 12/29/2022 Yes   68-year-old gentleman with known history of chronic alcohol abuse admitted with acute GI bleed with Lezama's esophagus/esophageal cancer with upper endoscopy revealing moderately large varices with moderate portal hypertensive gastropathy. Status post therapeutic paracentesis 4.5 L with positive leukocytosis on IV Zosyn for SBP prophylaxis. 3 units blood transfusion. Electrolytes replaced. Full code resuscitation status    Principal diagnosis  #Status post acute GI bleed requiring 3 units of blood transfusion. Secondary to portal hypertension from chronic alcohol abuse.   Consider discharge at [facility] recommendation with management of portal hypertension as appropriate    Plan:        Electrolyte replacement with pending discharge as per GI recommendation. Condition guarded.   Full code resuscitation status    Gregg Vaughan MD  12/29/2022  8:33 PM

## 2022-12-30 NOTE — PROGRESS NOTES
[facility]  Office: [address], DO, Pillo Blood, DO, Gamino Lloydt, DO, Norberto Gonzalez Blood, DO, Gala Alvarez MD, Bienvenido Rubio MD, Gilbert Valentino MD, Darrell Fisher MD,  Margarito Quiroz MD, Isabelle Abarca MD, Giovana Lowe, DO, Azar Gama MD,  Sherice Ricks MD, Su Holloway MD, Desmond Montague, DO, Lacey Nino MD, Alexandria Thomas MD, Kate Rachel, DO, Lissy King MD, Rahel Price MD, Arie Way MD, Alvino Rodriguez MD, Jess Myrick DO, Franc Mansfield MD, Flakita Salmon MD, Isabella Mendez, CNP,  Cherry العلي CNP, Dominique Toledo, CNP, Erika Warren, CNP,  Nhung Jordan, DNP, Clemente Antonio, CNP, Fang Méndez, CNP, Aldo Rivero, CNP, Herbert Abraham, CNP, Patrica Rose, CNP, Bonilla Haji PA-C, Nora Lipscomb, CNS, Carolina Umanzor, CNP, Shakira Sebastian, CNP         [facility]   Nighttime In-House Provider      12/30/2022    1:29 AM    Name:   Edyta Fraser  MRN:     [id number]     [place]:      [de-identified]   Room:   [address] [place] [address] Day:  2  Admit Date:  12/28/2022 11:30 PM    PCP:   VASU Roman  Code Status:  Full Code    Called to the floor by staff to evaluate Edyta Fraser in [id number]/5894-02 at 1:29 AM with complaint of acute confusion, increasing agitation, ?? Reduced urine output. Patient seen and evaluated. Patient demanding to go home this morning stating \"I need to get dressed\". Patient expresses grievance with current hospital attire, wants his cloths. Denies nausea vomiting abdominal pain. States \"it was only a mild bleed\". Denies further blood loss, rectal bleeding, abdominal symptoms. Assessment/Plan:   Acute encephalopathy. Clinically does express slightly diminished capacity. Continue to monitor closely, ?? Acute alcohol withdrawal with Dts.   Recheck labs to confirm no other metabolic derangement  Acute upper GI bleed with acute blood loss anemia with esophageal gastric varix with decompensated cirrhosis status post EGD redemonstrating varices, remains on octreotide and Protonix drip with consideration for IR for redo TIPS versus BRTO  Alcoholic cirrhosis ? ? Ongoing alcohol use    Question concerns and treatment plan discussed with patient. Discussed with nursing, trial of Zyprexa, patient agreeable. Start CIWA protocol. Continue monitor closely. Check labs. Due to the immediate potential for life-threatening deterioration due to acute metabolic delirium, I spent 38 minutes providing critical care. This time is excluding time spent performing procedures. Additional medical information reviewed:    Physical Examination:        Physical Exam  General sitting up in bed fidgety restless , slightly agitated but redirects appropriately, oriented, follows commands   cardiovascular tachycardic regular rhythm  Lungs nonlabored, no tachypnea clear bilaterally   GI soft nontender  Neuro nonfocal moving all 4 extremities spontaneously    Problem List:        Hospital Problems             Last Modified POA    Acute upper GI hemorrhage 12/29/2022 Yes    Esophageal varices with bleeding ([address] 12/29/2022 Yes    S/P TIPS (transjugular intrahepatic portosystemic shunt) 12/29/2022 Yes    Acute blood loss anemia 12/29/2022 Yes    Bleeding gastric varices 12/29/2022 Yes    Ascites due to alcoholic cirrhosis ([address] 88/50/8971 Yes    Shortness of breath 12/29/2022 Yes    Portal hypertension ([address] 12/29/2022 Yes    Other abnormalities of gait and mobility 12/29/2022 Yes    Near syncope 12/29/2022 Yes    Hepatic encephalopathy 60/40/4239 Yes    Periumbilical abdominal pain 12/29/2022 Yes    Decompensated hepatic cirrhosis ([address] 12/29/2022 Yes    DDD (degenerative disc disease), lumbar 12/29/2022 Yes    Gastrointestinal hemorrhage with melena 12/29/2022 Yes       Medications:      Allergies:  No Known Allergies    Current Meds:   Scheduled Meds:    sodium chloride flush  5-40 mL IntraVENous 2 times per day thiamine  100 mg Oral Daily    piperacillin-tazobactam  3,375 mg IntraVENous Q8H    sodium chloride flush  5-40 mL IntraVENous 2 times per day     Continuous Infusions:    sodium chloride      sodium chloride      sodium chloride 75 mL/hr at 22    octreotide (SandoSTATIN) infusion 25 mcg/hr (22)    pantoprazole 8 mg/hr (22 1846)     PRN Meds: OLANZapine (ZyPREXA) in sterile water IntraMUSCular, sodium chloride flush, sodium chloride, LORazepam **OR** LORazepam **OR** LORazepam **OR** LORazepam **OR** LORazepam **OR** LORazepam **OR** LORazepam **OR** LORazepam, potassium chloride, magnesium sulfate, diphenhydrAMINE, sodium chloride, sodium chloride flush, ondansetron **OR** ondansetron    Data:     Past Medical History:   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 ([facility] [address] Alcoholic cirrhosis of liver with ascites ([address] Anemia, Anxiety, Arthritis, Back pain, chronic, Lezama esophagus, BPH (benign prostatic hypertrophy), Cholelithiasis, Cirrhosis ([address] COVID-19, COVID-19 vaccine series completed, DDD (degenerative disc disease), lumbar, Depression, Esophageal cancer ([address] Esophageal varices ([address] Fatty liver, GERD (gastroesophageal reflux disease), GI bleed, Hep C w/o coma, chronic ([address] History of alcohol abuse, History of blood transfusion, History of colon polyps, History of tobacco abuse, [place] (hard of hearing), Hyperlipidemia, Hypertension, Hyponatremia, Hypotension, PONV (postoperative nausea and vomiting), Port-A-Cath in place, Portal hypertension ([address] Sciatica, Secondary esophageal varices ([address] Shortness of breath, Spinal stenosis, Stomach ulcer, Thrombocytopenia ([address] Tubular adenoma of colon, Vitamin D deficiency, and Wears glasses.     Vitals:  /73   Pulse (!) 110   Temp 97 °F (36.1 °C) (Temporal)   Resp 16   Ht 5' 10\" (1.778 m)   Wt 190 lb (86.2 kg)   SpO2 99%   BMI 27.26 kg/m²   Temp (24hrs), Av °F (36.7 °C), Min:96.8 °F (36 °C), Max:98.7 °F (37.1 °C)    No results for input(s): POCGLU in the last 72 hours. I/O (24Hr): Intake/Output Summary (Last 24 hours) at 12/30/2022 0129  Last data filed at 12/29/2022 1124  Gross per 24 hour   Intake 867.72 ml   Output 250 ml   Net 617.72 ml       Labs:    [unfilled]    Lab Results   Component Value Date/Time    SPECIAL NOT REPORTED 02/02/2022 11:28 AM     Lab Results   Component Value Date/Time    CULTURE NO GROWTH 11 HOURS 12/28/2022 03:45 PM       [unfilled]    Radiology:    XR CHEST (SINGLE VIEW FRONTAL)    Result Date: 12/27/2022  1. Minimal bibasilar atelectatic changes with trace left pleural fluid which is slightly decreased from 12/09/2022. CT ABDOMEN PELVIS W IV CONTRAST Additional Contrast? None    Result Date: 12/27/2022  Stable circumferential wall thickening of the distal esophagus and gastroesophageal junction consistent with known esophageal malignancy. No significant change since prior examination. No esophageal obstruction. Cirrhotic liver with associated portal venous hypertension, progressive ascites and stable splenomegaly. TIPS appears in place and patent Cholelithiasis Interval development of bibasal infiltrates and moderate bilateral pleural effusions as well as mild-to-moderate pericardial effusion     IR FLUORO GUIDED NEEDLE PLACEMENT    Result Date: 12/28/2022  Successful ultrasound-guided placement of a right upper extremity midline venous catheter. US LIVER    Result Date: 12/28/2022  1. Cholelithiasis without definite findings of acute cholecystitis. 2. Patent TIPS. Specific velocities recorded above. 3. Small volume abdominal ascites. 4. Hepatic morphologic features typical of cirrhosis. IR US GUIDED PARACENTESIS    Result Date: 12/28/2022  Successful ultrasound-guided therapeutic and diagnostic paracentesis. IR US GUIDED PARACENTESIS    Result Date: 12/23/2022  Successful ultrasound-guided paracentesis.        Nicky Ferrera MD  12/30/2022  1:29 AM

## 2022-12-30 NOTE — PROGRESS NOTES
Patient refused 4am BP, ripping cuff off during attempt to take BP. Patient refuses to put pulse oximeter on.

## 2022-12-30 NOTE — PROGRESS NOTES
[address] [facility] Pharmacokinetic Monitoring Service - Vancomycin     Sana Irving is a 60 y.o. male starting on vancomycin therapy for aspiration pneumonia. Pharmacy consulted by Dr. Zehra Mendez for monitoring and adjustment. Target Concentration: Goal AUC/CANDIDO 400-600 mg*hr/L    Additional Antimicrobials: cefepime    Pertinent Laboratory Values: Wt Readings from Last 1 Encounters:   12/30/22 190 lb (86.2 kg)     Temp Readings from Last 1 Encounters:   12/30/22 97.3 °F (36.3 °C) (Temporal)     Estimated Creatinine Clearance: 108 mL/min (based on SCr of 0.72 mg/dL). Recent Labs     12/30/22  0135 12/30/22  0556   CREATININE 0.67* 0.72   WBC 5.7 5.7     Procalcitonin:     Pertinent Cultures:  Culture Date Source Results        MRSA Nasal Swab: not ordered. Order placed by pharmacy.     Plan:  Dosing recommendations based on Bayesian software  Start vancomycin 1250mg q12  Anticipated AUC of 518 and trough concentration of 15.9 at steady state  Renal labs as indicated   Pharmacy will continue to monitor patient and adjust therapy as indicated    Thank you for the consult,  Nohemi Grove [facility]  12/30/2022 4:55 PM

## 2022-12-30 NOTE — PROGRESS NOTES
Patient observed getting out of bed, removing cardiac monitor leads, attempting to pull out midline. Patient successfully removed port dressing. Patient states he is going to let his dog out and states he is at home and for writer and other RN's to leave him alone. Attempted to re-orient patient to surroundings and situation. Patient calm and resting in bed. Port dressing changed, maintaining sterility.

## 2022-12-30 NOTE — PROGRESS NOTES
Patient had an unwitnessed fall and was found at 05:38. Bed alarm was on but did not sound an alarm. Patient was assisted back into bed, vitals were taken and neuro check was done. Physician notified of fall and came to bedside to assess patient. New orders to be placed.

## 2022-12-30 NOTE — OR NURSING
Monitors on. Orally intubated without issues. Properly positioined  supine on table. Neal inserted without issue. Art line et peripheral IV placed.  See CRNA charting for medications and vitals during procedure

## 2022-12-30 NOTE — CARE COORDINATION
Attempted to meet with patient to complete initial case management assessment but patient is currently confused and no family at bedside.  CM will need to contact family to complete assessment

## 2022-12-31 ENCOUNTER — APPOINTMENT (OUTPATIENT)
Dept: GENERAL RADIOLOGY | Age: 62
End: 2022-12-31
Attending: INTERNAL MEDICINE
Payer: MEDICARE

## 2022-12-31 ENCOUNTER — APPOINTMENT (OUTPATIENT)
Dept: CT IMAGING | Age: 62
End: 2022-12-31
Attending: INTERNAL MEDICINE
Payer: MEDICARE

## 2022-12-31 ENCOUNTER — APPOINTMENT (OUTPATIENT)
Dept: MRI IMAGING | Age: 62
End: 2022-12-31
Attending: INTERNAL MEDICINE
Payer: MEDICARE

## 2022-12-31 PROBLEM — H74.93: Status: ACTIVE | Noted: 2022-12-31

## 2022-12-31 LAB
ABSOLUTE EOS #: 0 K/UL (ref 0–0.4)
ABSOLUTE IMMATURE GRANULOCYTE: 0 K/UL (ref 0–0.3)
ABSOLUTE LYMPH #: 0.14 K/UL (ref 1–4.8)
ABSOLUTE MONO #: 0.36 K/UL (ref 0.1–0.8)
AMMONIA: 49 UMOL/L (ref 16–60)
BASOPHILS # BLD: 0 % (ref 0–2)
BASOPHILS ABSOLUTE: 0 K/UL (ref 0–0.2)
BILIRUBIN DIRECT: 4.1 MG/DL
BLD PROD TYP BPU: NORMAL
BLOOD BANK BLOOD PRODUCT EXPIRATION DATE: NORMAL
BLOOD BANK ISBT PRODUCT BLOOD TYPE: 600
BLOOD BANK PRODUCT CODE: NORMAL
BLOOD BANK UNIT TYPE AND RH: NORMAL
BPU ID: NORMAL
C-REACTIVE PROTEIN: 23.3 MG/L (ref 0–5)
CULTURE: NO GROWTH
DISPENSE STATUS BLOOD BANK: NORMAL
EOSINOPHILS RELATIVE PERCENT: 0 % (ref 1–4)
GLUCOSE BLD-MCNC: 167 MG/DL (ref 75–110)
GLUCOSE BLD-MCNC: 75 MG/DL (ref 75–110)
GLUCOSE BLD-MCNC: 82 MG/DL (ref 75–110)
HCT VFR BLD CALC: 25.7 % (ref 40.7–50.3)
HCT VFR BLD CALC: 26.2 % (ref 40.7–50.3)
HEMOGLOBIN: 7.4 G/DL (ref 13–17)
HEMOGLOBIN: 7.6 G/DL (ref 13–17)
IMMATURE GRANULOCYTES: 0 %
INR BLD: 1.4
LYMPHOCYTES # BLD: 3 % (ref 24–44)
MAGNESIUM: 1.4 MG/DL (ref 1.6–2.6)
MCH RBC QN AUTO: 25.3 PG (ref 25.2–33.5)
MCHC RBC AUTO-ENTMCNC: 29 G/DL (ref 28.4–34.8)
MCV RBC AUTO: 87.3 FL (ref 82.6–102.9)
MONOCYTES # BLD: 8 % (ref 1–7)
MORPHOLOGY: ABNORMAL
NRBC AUTOMATED: 0 PER 100 WBC
PARTIAL THROMBOPLASTIN TIME: 32.1 SEC (ref 20.5–30.5)
PDW BLD-RTO: 19.6 % (ref 11.8–14.4)
PHOSPHORUS: 3.6 MG/DL (ref 2.5–4.5)
PLATELET # BLD: 88 K/UL (ref 138–453)
PMV BLD AUTO: 9.8 FL (ref 8.1–13.5)
PROTHROMBIN TIME: 14.1 SEC (ref 9.1–12.3)
RBC # BLD: 3 M/UL (ref 4.21–5.77)
SEG NEUTROPHILS: 89 % (ref 36–66)
SEGMENTED NEUTROPHILS ABSOLUTE COUNT: 4 K/UL (ref 1.8–7.7)
SPECIMEN DESCRIPTION: NORMAL
SURGICAL PATHOLOGY REPORT: NORMAL
TRANSFUSION STATUS: NORMAL
UNIT DIVISION: 0
UNIT ISSUE DATE/TIME: NORMAL
WBC # BLD: 4.5 K/UL (ref 3.5–11.3)

## 2022-12-31 PROCEDURE — 2580000003 HC RX 258: Performed by: STUDENT IN AN ORGANIZED HEALTH CARE EDUCATION/TRAINING PROGRAM

## 2022-12-31 PROCEDURE — 85025 COMPLETE CBC W/AUTO DIFF WBC: CPT

## 2022-12-31 PROCEDURE — 82947 ASSAY GLUCOSE BLOOD QUANT: CPT

## 2022-12-31 PROCEDURE — 6370000000 HC RX 637 (ALT 250 FOR IP): Performed by: NURSE PRACTITIONER

## 2022-12-31 PROCEDURE — 71045 X-RAY EXAM CHEST 1 VIEW: CPT

## 2022-12-31 PROCEDURE — C9113 INJ PANTOPRAZOLE SODIUM, VIA: HCPCS | Performed by: INTERNAL MEDICINE

## 2022-12-31 PROCEDURE — 99232 SBSQ HOSP IP/OBS MODERATE 35: CPT | Performed by: INTERNAL MEDICINE

## 2022-12-31 PROCEDURE — 70551 MRI BRAIN STEM W/O DYE: CPT

## 2022-12-31 PROCEDURE — 6360000002 HC RX W HCPCS: Performed by: NURSE PRACTITIONER

## 2022-12-31 PROCEDURE — 84145 PROCALCITONIN (PCT): CPT

## 2022-12-31 PROCEDURE — 6360000002 HC RX W HCPCS: Performed by: INTERNAL MEDICINE

## 2022-12-31 PROCEDURE — 85018 HEMOGLOBIN: CPT

## 2022-12-31 PROCEDURE — 94640 AIRWAY INHALATION TREATMENT: CPT

## 2022-12-31 PROCEDURE — 2580000003 HC RX 258: Performed by: ANESTHESIOLOGY

## 2022-12-31 PROCEDURE — 70450 CT HEAD/BRAIN W/O DYE: CPT

## 2022-12-31 PROCEDURE — 85610 PROTHROMBIN TIME: CPT

## 2022-12-31 PROCEDURE — 6370000000 HC RX 637 (ALT 250 FOR IP): Performed by: INTERNAL MEDICINE

## 2022-12-31 PROCEDURE — APPSS60 APP SPLIT SHARED TIME 46-60 MINUTES: Performed by: NURSE PRACTITIONER

## 2022-12-31 PROCEDURE — 2580000003 HC RX 258: Performed by: INTERNAL MEDICINE

## 2022-12-31 PROCEDURE — 84100 ASSAY OF PHOSPHORUS: CPT

## 2022-12-31 PROCEDURE — 85730 THROMBOPLASTIN TIME PARTIAL: CPT

## 2022-12-31 PROCEDURE — 94761 N-INVAS EAR/PLS OXIMETRY MLT: CPT

## 2022-12-31 PROCEDURE — 36415 COLL VENOUS BLD VENIPUNCTURE: CPT

## 2022-12-31 PROCEDURE — 82140 ASSAY OF AMMONIA: CPT

## 2022-12-31 PROCEDURE — 2580000003 HC RX 258: Performed by: NURSE PRACTITIONER

## 2022-12-31 PROCEDURE — 2700000000 HC OXYGEN THERAPY PER DAY

## 2022-12-31 PROCEDURE — 72125 CT NECK SPINE W/O DYE: CPT

## 2022-12-31 PROCEDURE — 2060000000 HC ICU INTERMEDIATE R&B

## 2022-12-31 PROCEDURE — 85014 HEMATOCRIT: CPT

## 2022-12-31 PROCEDURE — 82248 BILIRUBIN DIRECT: CPT

## 2022-12-31 PROCEDURE — 83735 ASSAY OF MAGNESIUM: CPT

## 2022-12-31 PROCEDURE — 86140 C-REACTIVE PROTEIN: CPT

## 2022-12-31 RX ORDER — IPRATROPIUM BROMIDE AND ALBUTEROL SULFATE 2.5; .5 MG/3ML; MG/3ML
1 SOLUTION RESPIRATORY (INHALATION)
Status: DISPENSED | OUTPATIENT
Start: 2022-12-31 | End: 2023-01-05

## 2022-12-31 RX ORDER — MAGNESIUM SULFATE IN WATER 40 MG/ML
2000 INJECTION, SOLUTION INTRAVENOUS PRN
Status: DISCONTINUED | OUTPATIENT
Start: 2022-12-31 | End: 2023-01-02

## 2022-12-31 RX ORDER — LACTULOSE 10 G/15ML
20 SOLUTION ORAL
Status: DISCONTINUED | OUTPATIENT
Start: 2022-12-31 | End: 2023-01-04

## 2022-12-31 RX ADMIN — BACLOFEN 10 MG: 10 TABLET ORAL at 13:58

## 2022-12-31 RX ADMIN — CEFEPIME 2000 MG: 2 INJECTION, POWDER, FOR SOLUTION INTRAVENOUS at 06:30

## 2022-12-31 RX ADMIN — SODIUM CHLORIDE, PRESERVATIVE FREE 10 ML: 5 INJECTION INTRAVENOUS at 09:01

## 2022-12-31 RX ADMIN — SODIUM CHLORIDE, PRESERVATIVE FREE 10 ML: 5 INJECTION INTRAVENOUS at 09:00

## 2022-12-31 RX ADMIN — BACLOFEN 10 MG: 10 TABLET ORAL at 20:32

## 2022-12-31 RX ADMIN — LORAZEPAM 1 MG: 2 INJECTION INTRAMUSCULAR at 13:57

## 2022-12-31 RX ADMIN — LACTULOSE 20 G: 20 SOLUTION ORAL at 16:48

## 2022-12-31 RX ADMIN — BACLOFEN 10 MG: 10 TABLET ORAL at 09:02

## 2022-12-31 RX ADMIN — SODIUM CHLORIDE, PRESERVATIVE FREE 10 ML: 5 INJECTION INTRAVENOUS at 20:32

## 2022-12-31 RX ADMIN — CEFEPIME 2000 MG: 2 INJECTION, POWDER, FOR SOLUTION INTRAVENOUS at 16:48

## 2022-12-31 RX ADMIN — Medication 1250 MG: at 16:52

## 2022-12-31 RX ADMIN — Medication 100 MG: at 09:02

## 2022-12-31 RX ADMIN — SODIUM CHLORIDE 8 MG/HR: 9 INJECTION, SOLUTION INTRAVENOUS at 20:42

## 2022-12-31 RX ADMIN — LACTULOSE 20 G: 20 SOLUTION ORAL at 12:31

## 2022-12-31 RX ADMIN — OCTREOTIDE ACETATE 50 MCG/HR: 500 INJECTION, SOLUTION INTRAVENOUS; SUBCUTANEOUS at 16:55

## 2022-12-31 RX ADMIN — IPRATROPIUM BROMIDE AND ALBUTEROL SULFATE 1 AMPULE: .5; 3 SOLUTION RESPIRATORY (INHALATION) at 21:20

## 2022-12-31 RX ADMIN — Medication 1250 MG: at 05:32

## 2022-12-31 RX ADMIN — LACTULOSE 20 G: 20 SOLUTION ORAL at 20:32

## 2022-12-31 RX ADMIN — LORAZEPAM 4 MG: 2 INJECTION INTRAMUSCULAR at 03:25

## 2022-12-31 RX ADMIN — MAGNESIUM SULFATE HEPTAHYDRATE 2000 MG: 40 INJECTION, SOLUTION INTRAVENOUS at 10:16

## 2022-12-31 RX ADMIN — SODIUM CHLORIDE 8 MG/HR: 9 INJECTION, SOLUTION INTRAVENOUS at 09:57

## 2022-12-31 ASSESSMENT — PAIN SCALES - GENERAL
PAINLEVEL_OUTOF10: 8
PAINLEVEL_OUTOF10: 0

## 2022-12-31 NOTE — PLAN OF CARE
Problem: Discharge Planning  Goal: Discharge to home or other facility with appropriate resources  12/31/2022 0103 by Angela Huang RN  Outcome: Progressing     Problem: Pain  Goal: Verbalizes/displays adequate comfort level or baseline comfort level  12/31/2022 1219 by Bhavesh Hylton RN  Outcome: Progressing  12/31/2022 0103 by Angela Huang RN  Outcome: Progressing     Problem: Respiratory - Adult  Goal: Achieves optimal ventilation and oxygenation  12/31/2022 1219 by Bhavesh Hylton RN  Outcome: Progressing  12/31/2022 0103 by Angela Huang RN  Outcome: Progressing     Problem: Cardiovascular - Adult  Goal: Maintains optimal cardiac output and hemodynamic stability  12/31/2022 1219 by Bhavesh Hylton RN  Outcome: Progressing  12/31/2022 0103 by Angela Huang RN  Outcome: Progressing  Goal: Absence of cardiac dysrhythmias or at baseline  12/31/2022 0103 by Angela Huang RN  Outcome: Progressing     Problem: Musculoskeletal - Adult  Goal: Return mobility to safest level of function  12/31/2022 0103 by Angela Huang RN  Outcome: Progressing  Goal: Maintain proper alignment of affected body part  12/31/2022 0103 by Angela Huang RN  Outcome: Progressing  Goal: Return ADL status to a safe level of function  12/31/2022 0103 by Angela Huang RN  Outcome: Progressing     Problem: Gastrointestinal - Adult  Goal: Minimal or absence of nausea and vomiting  12/31/2022 0103 by Angela Huang RN  Outcome: Progressing  Goal: Maintains or returns to baseline bowel function  12/31/2022 0103 by Angela Huang RN  Outcome: Progressing     Problem: Hematologic - Adult  Goal: Maintains hematologic stability  12/31/2022 0103 by Angela Huang RN  Outcome: Progressing     Problem: Skin/Tissue Integrity  Goal: Absence of new skin breakdown  Description: 1. Monitor for areas of redness and/or skin breakdown  2. Assess vascular access sites hourly  3.   Every 4-6 hours minimum:  Change oxygen saturation probe site  4. Every 4-6 hours:  If on nasal continuous positive airway pressure, respiratory therapy assess nares and determine need for appliance change or resting period. 12/31/2022 0103 by Michelle Riley RN  Outcome: Progressing     Problem: Safety - Adult  Goal: Free from fall injury  12/31/2022 0103 by Michelle Riley RN  Outcome: Progressing     Problem: ABCDS Injury Assessment  Goal: Absence of physical injury  12/31/2022 0103 by Michelle Riley RN  Outcome: Progressing     Problem: Safety - Medical Restraint  Goal: Remains free of injury from restraints (Restraint for Interference with Medical Device)  Description: INTERVENTIONS:  1. Determine that other, less restrictive measures have been tried or would not be effective before applying the restraint  2. Evaluate the patient's condition at the time of restraint application  3. Inform patient/family regarding the reason for restraint  4.  Q2H: Monitor safety, psychosocial status, comfort, nutrition and hydration  12/31/2022 0103 by Michelle Riley RN  Outcome: Progressing

## 2022-12-31 NOTE — ANESTHESIA POSTPROCEDURE EVALUATION
Department of Anesthesiology  Postprocedure Note    Patient: Fred Rosenberg  MRN: [id number]  YOB: 1960  Date of evaluation: 12/31/2022      Procedure Summary     Date: 12/30/22 Room / Location: [facility] Special Procedures    Anesthesia Start: 1698 Anesthesia Stop: 8940    Procedure: IR REVISION TIPS Diagnosis: (BRTO)    Scheduled Providers: Stv Interventional Radiologist Responsible Provider: Pamelia Boas, MD    Anesthesia Type: general ASA Status: 3          Anesthesia Type: No value filed.     Yen Phase I: Yen Score: 9    Yen Phase II:        Anesthesia Post Evaluation    Patient location during evaluation: PACU  Patient participation: complete - patient participated  Level of consciousness: awake  Airway patency: patent  Nausea & Vomiting: no nausea and no vomiting  Complications: no  Cardiovascular status: blood pressure returned to baseline  Respiratory status: acceptable  Hydration status: euvolemic  Comments: BP (!) 100/55   Pulse 75   Temp 98.3 °F (36.8 °C) (Temporal)   Resp 10   Ht 5' 10\" (1.778 m)   Wt 190 lb 11.2 oz (86.5 kg)   SpO2 100%   BMI 27.36 kg/m²

## 2022-12-31 NOTE — PROGRESS NOTES
[address] [facility] Pharmacokinetic Monitoring Service - Vancomycin    Dwight Mohr is a 60 y.o. male starting on vancomycin therapy for aspiration pneumonia. Pharmacy consulted by Dr. Evelyn Tavares for monitoring and adjustment. Target Concentration: Goal AUC/CANDIDO 400-600 mg*hr/L  Day of Therapy: 2  Additional Antimicrobials: cefepime    Pertinent Laboratory Values: Wt Readings from Last 1 Encounters:   12/31/22 190 lb 11.2 oz (86.5 kg)     Temp Readings from Last 1 Encounters:   12/31/22 98.6 °F (37 °C) (Temporal)     Estimated Creatinine Clearance: 108 mL/min (based on SCr of 0.72 mg/dL). Recent Labs     12/30/22  0135 12/30/22  0556 12/31/22  0545   CREATININE 0.67* 0.72  --    WBC 5.7 5.7 4.5         Pertinent Cultures:  Culture Date Source Results        MRSA Nasal Swab: not ordered. Order placed by pharmacy.     Recent vancomycin administrations                     vancomycin (VANCOCIN) 1250 mg in sodium chloride 0.9% 250 mL IVPB (mg) 1,250 mg New Bag 12/31/22 0532                    Assessment:  Date/Time Current Dose Concentration Timing of Concentration (h) AUC          Note: Serum concentrations collected for AUC dosing may appear elevated if collected in close proximity to the dose administered, this is not necessarily an indication of toxicity    Plan:  Current dosing regimen is therapeutic  Continue current dose  Repeat vancomycin concentration ordered for  consider ordering for 1/2/23  Pharmacy will continue to monitor patient and adjust therapy as indicated    Thank you for the consult,  Yazmin Webster [facility]  12/31/2022 12:59 PM

## 2022-12-31 NOTE — PLAN OF CARE
Problem: Discharge Planning  Goal: Discharge to home or other facility with appropriate resources  12/31/2022 0103 by Selena Watson RN  Outcome: Progressing     Problem: Pain  Goal: Verbalizes/displays adequate comfort level or baseline comfort level  12/31/2022 1219 by Ari Jones RN  Outcome: Progressing  12/31/2022 0103 by Selena Watson RN  Outcome: Progressing     Problem: Respiratory - Adult  Goal: Achieves optimal ventilation and oxygenation  12/31/2022 1219 by Ari Jones RN  Outcome: Progressing  12/31/2022 0103 by Selena Watson RN  Outcome: Progressing     Problem: Cardiovascular - Adult  Goal: Maintains optimal cardiac output and hemodynamic stability  12/31/2022 1219 by Ari Jones RN  Outcome: Progressing  12/31/2022 0103 by Selena Watson RN  Outcome: Progressing  Goal: Absence of cardiac dysrhythmias or at baseline  12/31/2022 0103 by Selena Watson RN  Outcome: Progressing     Problem: Musculoskeletal - Adult  Goal: Return mobility to safest level of function  12/31/2022 0103 by Selena Watson RN  Outcome: Progressing  Goal: Maintain proper alignment of affected body part  12/31/2022 0103 by Selena Watson RN  Outcome: Progressing  Goal: Return ADL status to a safe level of function  12/31/2022 0103 by Selena Watson RN  Outcome: Progressing     Problem: Gastrointestinal - Adult  Goal: Minimal or absence of nausea and vomiting  12/31/2022 0103 by Selena Watson RN  Outcome: Progressing  Goal: Maintains or returns to baseline bowel function  12/31/2022 0103 by Selena Watson RN  Outcome: Progressing     Problem: Hematologic - Adult  Goal: Maintains hematologic stability  12/31/2022 0103 by Selena Watson RN  Outcome: Progressing     Problem: Skin/Tissue Integrity  Goal: Absence of new skin breakdown  Description: 1. Monitor for areas of redness and/or skin breakdown  2. Assess vascular access sites hourly  3.   Every 4-6 hours minimum:  Change oxygen saturation probe site  4. Every 4-6 hours:  If on nasal continuous positive airway pressure, respiratory therapy assess nares and determine need for appliance change or resting period. 12/31/2022 0103 by aIn Montelongo RN  Outcome: Progressing     Problem: Safety - Adult  Goal: Free from fall injury  12/31/2022 0103 by Ian Montelongo RN  Outcome: Progressing     Problem: ABCDS Injury Assessment  Goal: Absence of physical injury  12/31/2022 0103 by Ian Montelongo RN  Outcome: Progressing     Problem: Safety - Medical Restraint  Goal: Remains free of injury from restraints (Restraint for Interference with Medical Device)  Description: INTERVENTIONS:  1. Determine that other, less restrictive measures have been tried or would not be effective before applying the restraint  2. Evaluate the patient's condition at the time of restraint application  3. Inform patient/family regarding the reason for restraint  4.  Q2H: Monitor safety, psychosocial status, comfort, nutrition and hydration  12/31/2022 0103 by Ian Montelongo RN  Outcome: Progressing

## 2022-12-31 NOTE — PROGRESS NOTES
[facility]  Office: [address], DO, Ancelmo Brothers, DO, Lesia Marcus, DO, Violet Paris Blood, DO, Rodrigo Bejarano MD, Shyann Mitchell MD, Yvone Phalen, MD, Jayme Gonzalze MD,  Homer Hashimoto, MD, Gurdeep Wing MD, Marietta Rubio DO, Ana Alcantara MD,  Anusha Dangelo MD, Kecia Marte MD, Yong Choudhury DO, Abby Palacio MD, Rebecca Harp MD, Roselyn Rider, DO, Sarita Ramírez MD, Katie Ken MD, Dana Montoya MD, Ashley Salmon MD, Ochoa Fuchs DO, Efe Ham MD, Wily Panda MD, Leta Sullivan, CNP,  Ofelia Roche, CNP, Cathryn Gresham, CNP, Yasmany Valentine, CNP,  Jayme Dasilva, [facility], Melissa Ventura, CNP, Roddy Malone, CNP, Elizabet Balderas, CNP, Amy Swan, CNP, Alfie Enriquez, CNP, Mally Isaac PA-C, Izabel Hooper, CNS, Emmett Mclaughlin, CNP, Parth Montano, [address] [place] [address]    Progress Note    12/31/2022    5:04 PM    Name:   Yaneth Akins  MRN:     [id number]     [place]:      [de-identified]   Room:   [address] [place] [address] Day:  3  Admit Date:  12/28/2022 11:30 PM    PCP:   VASU Givens  Code Status:  Full Code    Subjective:     C/C: Still confused, arousable, disorientated to time place and person. old Left basal ganglia lacunar infarct with bilateral moderate mastoid effusion. Report noted after patient seen. ENT consult submitted. Case discussed with ENT. He continues to have a productive cough with rhonchi followed by KIMBER Foreman History Status: not changed. Brief History:   58-year-old gentleman with known history of chronic alcohol abuse admitted with acute GI bleed with Lezama's esophagus/esophageal cancer with upper endoscopy revealing moderately large varices with moderate portal hypertensive gastropathy.   Status post therapeutic paracentesis 4.5 L with positive leukocytosis on IV Zosyn for SBP prophylaxis which was switched to IV cefepime/vancomycin for aspiration pneumonia. 3 units blood transfusion. Electrolytes replaced. Full code resuscitation status. Cholelithiasis without cholecystitis for elevated total bilirubin 4.7 with negative Silva sign and HIDA scan confirmed gallstones. Status post revision of third         Review of Systems:     Constitutional:  negative for chills, fevers, sweats  Respiratory:  negative for cough, dyspnea on exertion, shortness of breath, wheezing  Cardiovascular:  negative for chest pain, chest pressure/discomfort, lower extremity edema, palpitations  Gastrointestinal:  negative for abdominal pain, constipation, diarrhea, nausea, vomiting  Neurological:  negative for dizziness, headache    Medications:      Allergies:  No Known Allergies    Current Meds:   Scheduled Meds:    lactulose  20 g Oral 6 times per day    sodium chloride flush  5-40 mL IntraVENous 2 times per day    sodium chloride flush  5-40 mL IntraVENous 2 times per day    thiamine  100 mg Oral Daily    baclofen  10 mg Oral TID    sodium chloride flush  5-40 mL IntraVENous 2 times per day    cefepime  2,000 mg IntraVENous Q12H    vancomycin (VANCOCIN) intermittent dosing (placeholder)   Other RX Placeholder    vancomycin  1,750 mg IntraVENous Once    Followed by    vancomycin  1,250 mg IntraVENous Q12H    sodium chloride flush  5-40 mL IntraVENous 2 times per day     Continuous Infusions:    sodium chloride      sodium chloride      sodium chloride      sodium chloride      sodium chloride      sodium chloride 75 mL/hr at 12/31/22 0356    octreotide (SandoSTATIN) infusion 50 mcg/hr (12/31/22 1655)    pantoprazole 8 mg/hr (12/31/22 0957)     PRN Meds: magnesium sulfate, sodium chloride flush, sodium chloride, ondansetron, hydrALAZINE, OLANZapine (ZyPREXA) in sterile water IntraMUSCular, sodium chloride flush, sodium chloride, LORazepam **OR** LORazepam **OR** LORazepam **OR** LORazepam **OR** LORazepam **OR** LORazepam **OR** LORazepam **OR** LORazepam, sodium chloride flush, sodium chloride, sodium chloride, LORazepam, potassium chloride, diphenhydrAMINE, sodium chloride, sodium chloride flush, ondansetron **OR** ondansetron    Data:     Past Medical History:   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 ([facility] Alcoholic cirrhosis of liver with ascites ([facility] Anemia, Anxiety, Arthritis, Back pain, chronic, Lezama esophagus, BPH (benign prostatic hypertrophy), Cholelithiasis, Cirrhosis ([facility] COVID-19, COVID-19 vaccine series completed, DDD (degenerative disc disease), lumbar, Depression, Esophageal cancer ([facility] Esophageal varices ([facility] Fatty liver, GERD (gastroesophageal reflux disease), GI bleed, Hep C w/o coma, chronic ([facility] History of alcohol abuse, History of blood transfusion, History of colon polyps, History of tobacco abuse, [place] (hard of hearing), Hyperlipidemia, Hypertension, Hyponatremia, Hypotension, Mastoid disorder, bilateral, PONV (postoperative nausea and vomiting), Port-A-Cath in place, Portal hypertension ([facility] Sciatica, Secondary esophageal varices ([facility] Shortness of breath, Spinal stenosis, Stomach ulcer, Thrombocytopenia ([facility] Tubular adenoma of colon, Vitamin D deficiency, and Wears glasses. Social History:   reports that he quit smoking about 5 years ago. His smoking use included cigarettes. He has a 45.00 pack-year smoking history. He has never used smokeless tobacco. He reports that he does not currently use alcohol. He reports that he does not currently use drugs after having used the following drugs: Cocaine. Frequency: 1.00 time per week.      Family History:   Family History   Problem Relation Age of Onset    Cancer Mother         pancreatic    Cancer Father         bone    Diabetes Sister     Asthma Brother     Allergies Brother         MULTIPLE       Vitals:  /69   Pulse 94   Temp 98.6 °F (37 °C) (Temporal)   Resp 20   Ht 5' 10\" (1.778 m)   Wt 190 lb 11.2 oz (86.5 kg)   SpO2 98%   BMI 27.36 kg/m²   Temp (24hrs), Av.4 °F (36.9 °C), Min:97 °F (36.1 °C), Max:99.7 °F (37.6 °C)    Recent Labs     22  0544 22  2314 22  0808 22  1222   POCGLU 79 93 75 82       I/O (24Hr): Intake/Output Summary (Last 24 hours) at 2022 1704  Last data filed at 2022 0656  Gross per 24 hour   Intake 1520.63 ml   Output 550 ml   Net 970.63 ml       Labs:  Hematology:  Recent Labs     22  0544 22  1410 22  0135 22  0556 22  0545   WBC  --   --  5.7 5.7 4.5   RBC  --   --  3.44* 3.37* 3.00*   HGB 7.2*   < > 8.7* 8.6* 7.6*   HCT 24.5*   < > 28.9* 28.9* 26.2*   MCV  --   --  84.0 85.8 87.3   MCH  --   --  25.3 25.5 25.3   MCHC  --   --  30.1 29.8 29.0   RDW  --   --  18.3* 18.9* 19.6*   PLT  --   --  88* 90* 88*   MPV  --   --  9.9 9.8 9.8   CRP  --   --   --  19.5* 23.3*   INR 1.3  --   --   --  1.4    < > = values in this interval not displayed.      Chemistry:  Recent Labs     22  2356 22  0544 22  0135 22  0556 22  1106 22  0545   * 132* 135 135  --   --    K 3.5* 3.5* 3.5* 3.6*  --   --    CL 98 100 102 101  --   --    CO2 22 19* 18* 17*  --   --    GLUCOSE 86 79 84 82  --   --    BUN 11 11 12 11  --   --    CREATININE 0.65* 0.61* 0.67* 0.72  --   --    MG 1.6 1.5*  --   --   --  1.4*   ANIONGAP 10 13 15 17  --   --    LABGLOM >60 >60 >60 >60  --   --    CALCIUM 7.9* 7.6* 7.9* 7.8*  --   --    PHOS  --   --   --  3.2  --  3.6   PROBNP  --   --   --  556*  --   --    LACTACIDWB  --   --   --   --  1.1  --      Recent Labs     22  0135 22  0544 22  0556 22  2314 22  0545 22  0808 22  1222   PROT 4.6*  --  4.7*  --   --   --   --    LABALBU 2.5*  --  2.7*  --   --   --   --    AST 36  --  38  --   --   --   --    ALT 13  --  14  --   --   --   --    ALKPHOS 144*  --  141*  --   --   --   --    BILITOT 4.7*  --  4.7*  --   --   --   --    BILIDIR  --   --   --   --  4.1*  --   --    AMMONIA 53 --   --   --  49  --   --    POCGLU  --  79  --  93  --  75 82     ABG:  Lab Results   Component Value Date/Time    FIO2 INFORMATION NOT PROVIDED 12/30/2022 01:35 AM     Lab Results   Component Value Date/Time    SPECIAL NOT REPORTED 02/02/2022 11:28 AM     Lab Results   Component Value Date/Time    CULTURE NO GROWTH 3 DAYS 12/28/2022 03:45 PM       Radiology:  XR CHEST (SINGLE VIEW FRONTAL)    Result Date: 12/27/2022  1. Minimal bibasilar atelectatic changes with trace left pleural fluid which is slightly decreased from 12/09/2022. CT HEAD WO CONTRAST    Result Date: 12/31/2022  CT BRAIN: 1. No evidence of acute hemorrhage, large territorial hypodensity, significant mass effect/midline shift, or ventriculomegaly 2. Involutional parenchymal changes. 3. Age-indeterminate (likely chronic) left basal ganglia lacunar infarct. If clinically indicated, can consider further evaluation with MRI if no contraindications. CT CERVICAL SPINE: 1. No acute abnormality of the cervical spine. 2. Multilevel cervical spondylosis, most notable at C5-C6 and C6-C7. No high-grade bony spinal canal stenosis. CT CERVICAL SPINE WO CONTRAST    Result Date: 12/31/2022  CT BRAIN: 1. No evidence of acute hemorrhage, large territorial hypodensity, significant mass effect/midline shift, or ventriculomegaly 2. Involutional parenchymal changes. 3. Age-indeterminate (likely chronic) left basal ganglia lacunar infarct. If clinically indicated, can consider further evaluation with MRI if no contraindications. CT CERVICAL SPINE: 1. No acute abnormality of the cervical spine. 2. Multilevel cervical spondylosis, most notable at C5-C6 and C6-C7. No high-grade bony spinal canal stenosis. CT ABDOMEN PELVIS W IV CONTRAST Additional Contrast? None    Result Date: 12/27/2022  Stable circumferential wall thickening of the distal esophagus and gastroesophageal junction consistent with known esophageal malignancy.   No significant change since prior examination. No esophageal obstruction. Cirrhotic liver with associated portal venous hypertension, progressive ascites and stable splenomegaly. TIPS appears in place and patent Cholelithiasis Interval development of bibasal infiltrates and moderate bilateral pleural effusions as well as mild-to-moderate pericardial effusion     IR FLUORO GUIDED NEEDLE PLACEMENT    Result Date: 12/28/2022  Successful ultrasound-guided placement of a right upper extremity midline venous catheter. US LIVER    Result Date: 12/28/2022  1. Cholelithiasis without definite findings of acute cholecystitis. 2. Patent TIPS. Specific velocities recorded above. 3. Small volume abdominal ascites. 4. Hepatic morphologic features typical of cirrhosis. US GALLBLADDER RUQ    Result Date: 12/30/2022  Multiple cholelithiasis but no ultrasound evidence cholecystitis. Nondilated biliary tree. Cirrhosis. Patent TIPS. Ascites. Additional findings, as above. XR CHEST PORTABLE    Result Date: 12/31/2022  Little change from prior study. Cardiomegaly, vascular congestion, effusions, atelectasis and possible superimposed acute airspace disease are again noted. XR CHEST PORTABLE    Result Date: 12/30/2022  Cardiomegaly with probable mild central vascular congestion and similar bilateral pleural effusions with compressive atelectasis; filtrate at either base a consideration. No pulmonary edema. IR US GUIDED PARACENTESIS    Result Date: 12/28/2022  Successful ultrasound-guided therapeutic and diagnostic paracentesis. MRI BRAIN WO CONTRAST    Result Date: 12/31/2022  No evidence of acute ischemia. New moderate-sized bilateral mastoid effusions. Small old lacune within the left basal ganglia. New low T1 signal within the marrow elements diffusely likely representing a marrow replacement process such as anemia.        Physical Examination:      General appearance: Patient is more arousable with bilateral soft restraints with disorientation to person, place and time. Mental Status: V2 and  Lungs: Scattered rhonchi on deep inspiratory auscultation bilaterally, normal effort  Heart:  regular rate and rhythm, no murmur  Abdomen:  soft, t slight tender in the right upper quadrant as evident by his furrowing of eye browson palpation, distended, normal bowel sounds, no masses, hepatomegaly, splenomegaly  Extremities:  no edema, redness, tenderness in the calves  Skin:  no gross lesions, rashes, induration  Assessment:        Hospital Problems             Last Modified POA    Ascites due to alcoholic cirrhosis ([address] 50/04/8483 Yes    Shortness of breath 12/29/2022 Yes    Portal hypertension ([address] 12/29/2022 Yes    Esophageal varices with bleeding ([address] 12/29/2022 Yes    Other abnormalities of gait and mobility 12/29/2022 Yes    S/P TIPS (transjugular intrahepatic portosystemic shunt) 12/29/2022 Yes    Acute blood loss anemia 12/29/2022 Yes    Near syncope 12/29/2022 Yes    Bleeding gastric varices 12/29/2022 Yes    Hepatic encephalopathy 17/25/4979 Yes    Periumbilical abdominal pain 12/29/2022 Yes    Lezama's esophagus with dysplasia 12/30/2022 Yes    Mastoid disorder, bilateral 12/31/2022 Yes    Overview Signed 12/31/2022  4:50 PM by Chevy Lassiter MD     biLateral mastoid effusion         Acute hyperactive alcohol withdrawal delirium ([address] 12/30/2022 Yes    Decompensated hepatic cirrhosis ([address] 12/29/2022 Yes    DDD (degenerative disc disease), lumbar 12/29/2022 Yes    Acute upper GI hemorrhage 12/29/2022 Yes    Gastrointestinal hemorrhage with melena 12/29/2022 Yes   41-year-old gentleman with known history of chronic alcohol abuse admitted with acute GI bleed with Lezama's esophagus Lezama's esophagus/esophageal cancer esophageal cancer with upper endoscopy revealing moderately large varices with moderate portal hypertensive gastropathy.   Status post therapeutic paracentesis 4.5 L with positive leukocytosis on IV Zosyn for SBP prophylaxis which was switched to IV cefepime/vancomycin for pneumonia with high risk for Pseudomonas and MRSA. Meagan Martinez 3 units blood transfusion. Chest x-ray showed bilateral pneumonia with mild to moderate pericardial effusion with recent echocardiogram showing no confirmation. IV antibiotics changed from Zosyn to cefepime and vancomycin for aspiration pneumonia coverage with possible complication from Pseudomonas due to his esophageal cancer history. Elevated CRP 19.5 which is rising to 23.3. Elevated total bilirubin 4.7 for further work-up for biliary obstruction with HIDA scan and bilirubin fractionation. Confirmed cholelithiasis. Without cholecystitis  Episode of fall with decreased mentation with CT of head and neck and MRI of head showed old lacunar infarct of left basal ganglia, bilateral mastoid effusion discussed with ENT and confirmed on MRI of brain. Mildly elevated ammonia level. Principal diagnosis  #History of GI bleed secondary to portal hypertension with alcoholic cirrhosis with moderate to large varices requiring 3 units of blood with restrictive transfusion to maintain hemoglobin at 8. Placed on IV Zosyn for SBP prophylaxis for ascites removed 4.5 L with therapeutic paracentesis. Status post revision of TIPS. DC'd IV Zosyn currently and IV cefepime/vancomycin due to aspiration pneumonia continue IV pantoprazole, octreotide. Lactulose    Active diagnoses    #Bilateral basilar pneumonia with high risk for aspiration with consideration for Pseudomonas pneumonia. Change IV Zosyn to IV cefepime and vancomycin with pharmacy to dose pending MRSA of nose. Elevated CRP was 19.5 rising to 23.3. Pending procalcitonin level. Follow-up chest x-ray. Sputum culture and sensitivity. elevated respiration 33/min pulse 124/min    #Elevated bilirubin total bilirubin 4.7 with suspected right upper quadrant pain on palpation. Pending ultrasound of gallbladder.   Direct bilirubin alkaline phosphatase HIDA scan on IV cefepime GI consult appreciated. #Elevated ammonia level-lactulose. #History of unwitnessed fall with decreased mentation-stat CT of head and C-spine to rule out intracranial bleed-results reviewed  #Suggestive protein calorie malnutrition with total protein 4.7, albumin 2.7.-Enteral hyperalimentation. #Chronic alcohol abuse-currently on Ativan for withdrawal with visual hallucinations drinking 6 beers a day for the last 50 years. Continue CIWA score monitor patient closely. Plan:        Continue IV cefepime/vancomycin with pharmacy to dose for bilateral pneumonia. 2.  History of recent GI bleed requiring 3 units of blood transfusion secondary to portal hypertension-continue IV pantoprazole/octreotide/lactulose. 3.  Old left basal ganglia lacunar infarct for follow-up outpatient with neurology with PT/OT to evaluate patient once he is more stable. 4.  Elevated ammonia-continue lactulose  5. Hypomagnesemia 1.4 replaced  6. Nutrition consult-supplement  Full code resuscitation  Condition guarded  Evaluate for skilled nursing upon discharge with fall risk assessment by inpatient PT OT once he is more stable.   Soft restraints added due to high risk of interference with multiple lines being ripped off  Kenia aBzan MD  12/31/2022  5:04 PM

## 2022-12-31 NOTE — CARE COORDINATION
SW following for consult. Spoke with RN who confirmed patient remains confused and in restraints. Will follow up when appropriate.

## 2022-12-31 NOTE — PROGRESS NOTES
[facility]. [facility]   Gastroenterology Progress Note    Chata Mares is a 60 y.o. male patient. Hospitalization Day:3      Chief consult reason:   Evaluation for GI bleeding, melena    Subjective:  Patient seen and examined. Patient in soft wrist restraints due to acute confusion and unable to cooperate. Patient currently on PPI drip octreotide drip. Patient had TIPS revision yesterday with upsizing of shunt from 6 mm to 10 mm and embolization of left gastric vein due to bleeding. No rectal bleeding overnight  Hgb dipped to 7.6 g/dL this am, PLT 88, INR 1.4    VITALS:  /69   Pulse 94   Temp 98.6 °F (37 °C) (Temporal)   Resp 20   Ht 5' 10\" (1.778 m)   Wt 190 lb 11.2 oz (86.5 kg)   SpO2 98%   BMI 27.36 kg/m²   TEMPERATURE:  Current - Temp: 98.6 °F (37 °C); Max - Temp  Av.3 °F (36.8 °C)  Min: 97 °F (36.1 °C)  Max: 99.7 °F (37.6 °C)    Physical Assessment:  General appearance: uncooperative, confused  Mental Status:  oriented to person  Lungs:  clear to auscultation bilaterally, normal effort  Heart:  regular rate and rhythm, no murmur  Abdomen:  soft, nontender, slightly distended, normal bowel sounds, no masses, hepatomegaly, splenomegaly  Extremities:  no edema, redness, tenderness in the calves  Skin:  no gross lesions, rashes, induration    Data Review:    Labs and Imaging:     CBC:  Recent Labs     22  1432 22  2356 22  0544 22  1410 22  1856 22  0135 22  0556 22  0545   WBC  --  4.4  --   --   --  5.7 5.7 4.5   HGB 7.0* 7.0*   < > 8.1* 9.0* 8.7* 8.6* 7.6*   MCV  --  82.8  --   --   --  84.0 85.8 87.3   RDW  --  17.2*  --   --   --  18.3* 18.9* 19.6*   PLT 78* See Reflexed IPF Result  --   --   --  88* 90* 88*    < > = values in this interval not displayed.        ANEMIA STUDIES:  Recent Labs     22   LABIRON 16*   TIBC 176*       BMP:  Recent Labs     2244 22  0135 22  0556   * 135 135   K 3. 5* 3.5* 3.6*    102 101   CO2 19* 18* 17*   BUN 11 12 11   CREATININE 0.61* 0.67* 0.72   GLUCOSE 79 84 82   CALCIUM 7.6* 7.9* 7.8*       LFTS:  Recent Labs     12/30/22  0135 12/30/22  0556 12/31/22  0545   ALKPHOS 144* 141*  --    ALT 13 14  --    AST 36 38  --    BILITOT 4.7* 4.7*  --    BILIDIR  --   --  4.1*   LABALBU 2.5* 2.7*  --        Amylase/Lipase and Ammonia:  Recent Labs     12/30/22  0135 12/31/22  0545   AMMONIA 53 49       Acute Hepatitis Panel:  Lab Results   Component Value Date/Time    HEPBSAG NONREACTIVE 12/23/2020 05:09 PM    HEPCAB REACTIVE 12/23/2020 05:09 PM    HEPBIGM NONREACTIVE 12/23/2020 05:09 PM    HEPAIGM NONREACTIVE 08/19/2021 01:29 PM       HCV Genotype:  Lab Results   Component Value Date/Time    HEPATITISCGENOTYPE Mixed 09/06/2016 02:07 PM       HCV Quantitative:  Lab Results   Component Value Date/Time    HCVQNT NOT REPORTED 09/06/2016 02:07 PM       LIVER WORK UP:    AFP  Lab Results   Component Value Date/Time    AFP 2.5 04/13/2022 03:37 PM       Alpha 1 antitrypsin   Lab Results   Component Value Date/Time    A1A 152 08/19/2021 01:29 PM       MARYSOL  Lab Results   Component Value Date/Time    MARYSOL NEGATIVE 07/21/2016 09:29 PM       AMA  Lab Results   Component Value Date/Time    MITOAB 2.6 07/21/2016 09:29 PM       ASMA  No results found for: SMOOTHMUSCAB    PT/INR  Recent Labs     12/29/22  0544 12/31/22  0545   PROTIME 13.8* 14.1*   INR 1.3 1.4       Cancer Markers:  CEA:  No results for input(s): CEA in the last 72 hours. Ca 125:  No results for input(s):  in the last 72 hours. Ca 19-9:   Invalid input(s):   AFP: No results for input(s): AFP in the last 72 hours.   Lactic acid:Invalid input(s): LACTIC ACID    Radiology Review:    US LIVER    Result Date: 12/28/2022  EXAMINATION: RIGHT UPPER QUADRANT ULTRASOUND 12/28/2022 4:16 pm COMPARISON: CT abdomen 12/27/2022 HISTORY: ORDERING SYSTEM PROVIDED HISTORY: check patency of TIPS FINDINGS: LIVER:  The liver demonstrates heterogeneous, multinodular pattern echogenicity without dominant hepatic lesion demonstrated sonographically. No evidence of intrahepatic biliary ductal dilatation. TIPS: Duplex and Doppler waveform imaging of the tips was performed with the following flow velocities: Distal 30.6 cm/second, mid 29.1 cm/second, proximal 33.6 cm/second. BILIARY SYSTEM:  Gallbladder demonstrates a number of layering, echogenic shadowing stones. No wall thickening evident. Negative sonographic Silva's sign. Common bile duct is within normal limits measuring 3 mm. RIGHT KIDNEY: The right kidney is grossly unremarkable without evidence of hydronephrosis. Right kidney long dimension 11.2 cm. PANCREAS:  Visualized portions of the pancreas are unremarkable. OTHER: Small volume right upper quadrant ascites. 1.  Cholelithiasis without definite findings of acute cholecystitis. 2. Patent TIPS. Specific velocities recorded above. 3. Small volume abdominal ascites. 4. Hepatic morphologic features typical of cirrhosis. Active Problems:    Ascites due to alcoholic cirrhosis (HCC)    Shortness of breath    Portal hypertension (HCC)    Esophageal varices with bleeding (HCC)    Other abnormalities of gait and mobility    S/P TIPS (transjugular intrahepatic portosystemic shunt)    Acute blood loss anemia    Near syncope    Bleeding gastric varices    Hepatic encephalopathy    Periumbilical abdominal pain    Lezama's esophagus with dysplasia    Acute hyperactive alcohol withdrawal delirium (HCC)    Decompensated hepatic cirrhosis (HCC)    DDD (degenerative disc disease), lumbar    Acute upper GI hemorrhage    Gastrointestinal hemorrhage with melena  Resolved Problems:    * No resolved hospital problems. *       GI Impression:    Decompensated liver cirrhosis s/p TIPS revision 12/30/2022 with upsizing of stent and embolization of left gastric vein. Hgb dropped slightly to 7.6 g/dL.    -No overt bleeding noted  -Per IR note-if patient continues to have GI bleeding may consider alternative procedure BATO per IR  Grade 2 encephalopathy-secondary to underlying cirrhosis, recent TIPS revision  Esophageal and gastric varices s/p EGD 12/29/2022  Refractory ascites secondary to underlying liver disease, recent TIPS  Hyponatremia most likely secondary to underlying cirrhosis-resolved  Thrombocytopenia secondary to underlying cirrhosis      Plan and Recommendations:    Start lactulose 20 g every 4 hours. Will reevaluate dose and frequency in a.m. Clear liquid diet with low-sodium high-protein ensures 3 times daily  Continue Protonix drip,  continue octreotide drip and Rocephin 2 g IV every 24 hours  Consider antibiotic alternative to vancomycin given patient at high risk for HRS given underlying decompensated cirrhosis, encephalopathy  Daily labs including CBC BMP LFT INR  Monitor closely for acute decompensation  Monitor closely for electrolyte imbalance and treat accordingly  Will follow        This plan was formulated in collaboration with Dr. Thomas Barroso MD    Thank you for allowing me to participate in the care of your patient. Please feel free to contact me with any questions or concerns. [address] [facility]   Formerly [facility], [address] [place] [address]   [phone]  12/31/2022  11:32 AM    Estimated time of 60 mins reviewing chart, assessing patient and formulating plan of care    This note was created with the assistance of a speech-recognition program.  Although the intention is to generate a document that actually reflects the content of the visit, no guarantees can be provided that every mistake has been identified and corrected by editing.

## 2022-12-31 NOTE — PLAN OF CARE
Problem: Discharge Planning  Goal: Discharge to home or other facility with appropriate resources  Outcome: Progressing     Problem: Pain  Goal: Verbalizes/displays adequate comfort level or baseline comfort level  Outcome: Progressing     Problem: Respiratory - Adult  Goal: Achieves optimal ventilation and oxygenation  Outcome: Progressing     Problem: Cardiovascular - Adult  Goal: Maintains optimal cardiac output and hemodynamic stability  Outcome: Progressing  Goal: Absence of cardiac dysrhythmias or at baseline  Outcome: Progressing     Problem: Musculoskeletal - Adult  Goal: Return mobility to safest level of function  Outcome: Progressing  Goal: Maintain proper alignment of affected body part  Outcome: Progressing  Goal: Return ADL status to a safe level of function  Outcome: Progressing     Problem: Gastrointestinal - Adult  Goal: Minimal or absence of nausea and vomiting  Outcome: Progressing  Goal: Maintains or returns to baseline bowel function  Outcome: Progressing     Problem: Hematologic - Adult  Goal: Maintains hematologic stability  Outcome: Progressing     Problem: Skin/Tissue Integrity  Goal: Absence of new skin breakdown  Description: 1. Monitor for areas of redness and/or skin breakdown  2. Assess vascular access sites hourly  3. Every 4-6 hours minimum:  Change oxygen saturation probe site  4. Every 4-6 hours:  If on nasal continuous positive airway pressure, respiratory therapy assess nares and determine need for appliance change or resting period. Outcome: Progressing     Problem: Safety - Adult  Goal: Free from fall injury  Outcome: Progressing     Problem: ABCDS Injury Assessment  Goal: Absence of physical injury  Outcome: Progressing     Problem: Safety - Medical Restraint  Goal: Remains free of injury from restraints (Restraint for Interference with Medical Device)  Description: INTERVENTIONS:  1.  Determine that other, less restrictive measures have been tried or would not be effective before applying the restraint  2. Evaluate the patient's condition at the time of restraint application  3. Inform patient/family regarding the reason for restraint  4.  Q2H: Monitor safety, psychosocial status, comfort, nutrition and hydration  Outcome: Progressing

## 2023-01-01 ENCOUNTER — APPOINTMENT (OUTPATIENT)
Dept: GENERAL RADIOLOGY | Age: 63
End: 2023-01-01
Attending: INTERNAL MEDICINE
Payer: MEDICARE

## 2023-01-01 PROBLEM — G93.40 ENCEPHALOPATHY ACUTE: Status: ACTIVE | Noted: 2023-01-01

## 2023-01-01 LAB
ABSOLUTE EOS #: 0 K/UL (ref 0–0.4)
ABSOLUTE IMMATURE GRANULOCYTE: 0 K/UL (ref 0–0.3)
ABSOLUTE LYMPH #: 0.07 K/UL (ref 1–4.8)
ABSOLUTE MONO #: 0.25 K/UL (ref 0.1–0.8)
ALBUMIN SERPL-MCNC: 2.1 G/DL (ref 3.5–5.2)
ALBUMIN SERPL-MCNC: 2.2 G/DL (ref 3.5–5.2)
ALBUMIN SERPL-MCNC: 2.2 G/DL (ref 3.5–5.2)
ALBUMIN/GLOBULIN RATIO: 1 (ref 1–2.5)
ALBUMIN/GLOBULIN RATIO: 1.1 (ref 1–2.5)
ALBUMIN/GLOBULIN RATIO: 1.1 (ref 1–2.5)
ALP BLD-CCNC: 110 U/L (ref 40–129)
ALP BLD-CCNC: 111 U/L (ref 40–129)
ALP BLD-CCNC: 113 U/L (ref 40–129)
ALT SERPL-CCNC: 23 U/L (ref 5–41)
ALT SERPL-CCNC: 27 U/L (ref 5–41)
ALT SERPL-CCNC: 27 U/L (ref 5–41)
ANION GAP SERPL CALCULATED.3IONS-SCNC: 12 MMOL/L (ref 9–17)
ANION GAP SERPL CALCULATED.3IONS-SCNC: 12 MMOL/L (ref 9–17)
ANION GAP SERPL CALCULATED.3IONS-SCNC: 7 MMOL/L (ref 9–17)
AST SERPL-CCNC: 101 U/L
AST SERPL-CCNC: 84 U/L
AST SERPL-CCNC: 98 U/L
BACTERIA: NORMAL
BASOPHILS # BLD: 1 % (ref 0–2)
BASOPHILS ABSOLUTE: 0.04 K/UL (ref 0–0.2)
BILIRUB SERPL-MCNC: 4.8 MG/DL (ref 0.3–1.2)
BILIRUB SERPL-MCNC: 4.9 MG/DL (ref 0.3–1.2)
BILIRUB SERPL-MCNC: 5.2 MG/DL (ref 0.3–1.2)
BILIRUBIN URINE: ABNORMAL
BUN BLDV-MCNC: 6 MG/DL (ref 8–23)
BUN BLDV-MCNC: 6 MG/DL (ref 8–23)
BUN BLDV-MCNC: 7 MG/DL (ref 8–23)
C-REACTIVE PROTEIN: 26.9 MG/L (ref 0–5)
C-REACTIVE PROTEIN: 26.9 MG/L (ref 0–5)
CALCIUM SERPL-MCNC: 7.6 MG/DL (ref 8.6–10.4)
CASTS UA: NORMAL /LPF (ref 0–2)
CHLORIDE BLD-SCNC: 109 MMOL/L (ref 98–107)
CHLORIDE BLD-SCNC: 110 MMOL/L (ref 98–107)
CHLORIDE BLD-SCNC: 111 MMOL/L (ref 98–107)
CO2: 17 MMOL/L (ref 20–31)
CO2: 18 MMOL/L (ref 20–31)
CO2: 22 MMOL/L (ref 20–31)
COLOR: ABNORMAL
CREAT SERPL-MCNC: 0.48 MG/DL (ref 0.7–1.2)
CREAT SERPL-MCNC: 0.53 MG/DL (ref 0.7–1.2)
CREAT SERPL-MCNC: 0.55 MG/DL (ref 0.7–1.2)
CULTURE: ABNORMAL
DIRECT EXAM: ABNORMAL
EOSINOPHILS RELATIVE PERCENT: 0 % (ref 1–4)
EPITHELIAL CELLS UA: NORMAL /HPF (ref 0–5)
GFR SERPL CREATININE-BSD FRML MDRD: >60 ML/MIN/1.73M2
GLUCOSE BLD-MCNC: 144 MG/DL (ref 75–110)
GLUCOSE BLD-MCNC: 158 MG/DL (ref 70–99)
GLUCOSE BLD-MCNC: 257 MG/DL (ref 70–99)
GLUCOSE BLD-MCNC: 260 MG/DL (ref 70–99)
GLUCOSE URINE: NEGATIVE
HCT VFR BLD CALC: 27.8 % (ref 40.7–50.3)
HCT VFR BLD CALC: 28 % (ref 40.7–50.3)
HCT VFR BLD CALC: 29.2 % (ref 40.7–50.3)
HEMOGLOBIN: 7.9 G/DL (ref 13–17)
HEMOGLOBIN: 8 G/DL (ref 13–17)
HEMOGLOBIN: 8.7 G/DL (ref 13–17)
IMMATURE GRANULOCYTES: 0 %
INR BLD: 1.3
KETONES, URINE: ABNORMAL
LACTIC ACID, SEPSIS WHOLE BLOOD: 6.6 MMOL/L (ref 0.5–1.9)
LACTIC ACID, WHOLE BLOOD: 4.5 MMOL/L (ref 0.7–2.1)
LEUKOCYTE ESTERASE, URINE: ABNORMAL
LYMPHOCYTES # BLD: 2 % (ref 24–44)
MAGNESIUM: 1.6 MG/DL (ref 1.6–2.6)
MAGNESIUM: 1.9 MG/DL (ref 1.6–2.6)
MAGNESIUM: 2 MG/DL (ref 1.6–2.6)
MCH RBC QN AUTO: 25.6 PG (ref 25.2–33.5)
MCHC RBC AUTO-ENTMCNC: 28.6 G/DL (ref 28.4–34.8)
MCV RBC AUTO: 89.5 FL (ref 82.6–102.9)
MONOCYTES # BLD: 7 % (ref 1–7)
MORPHOLOGY: ABNORMAL
NITRITE, URINE: NEGATIVE
NRBC AUTOMATED: 0 PER 100 WBC
PDW BLD-RTO: 20.2 % (ref 11.8–14.4)
PH UA: 5.5 (ref 5–8)
PLATELET # BLD: 84 K/UL (ref 138–453)
PMV BLD AUTO: 10 FL (ref 8.1–13.5)
POTASSIUM SERPL-SCNC: 3.1 MMOL/L (ref 3.7–5.3)
POTASSIUM SERPL-SCNC: 3.3 MMOL/L (ref 3.7–5.3)
POTASSIUM SERPL-SCNC: 3.4 MMOL/L (ref 3.7–5.3)
PRO-BNP: 417 PG/ML
PROCALCITONIN: 0.11 NG/ML
PROTEIN UA: ABNORMAL
PROTHROMBIN TIME: 14 SEC (ref 9.1–12.3)
RBC # BLD: 3.13 M/UL (ref 4.21–5.77)
RBC UA: NORMAL /HPF (ref 0–2)
REASON FOR REJECTION: NORMAL
SEG NEUTROPHILS: 90 % (ref 36–66)
SEGMENTED NEUTROPHILS ABSOLUTE COUNT: 3.14 K/UL (ref 1.8–7.7)
SODIUM BLD-SCNC: 138 MMOL/L (ref 135–144)
SODIUM BLD-SCNC: 140 MMOL/L (ref 135–144)
SODIUM BLD-SCNC: 140 MMOL/L (ref 135–144)
SPECIFIC GRAVITY UA: 1.02 (ref 1–1.03)
SPECIMEN DESCRIPTION: ABNORMAL
THYROXINE, FREE: 0.67 NG/DL (ref 0.93–1.7)
TOTAL PROTEIN: 4.2 G/DL (ref 6.4–8.3)
TSH SERPL DL<=0.05 MIU/L-ACNC: 0.19 UIU/ML (ref 0.3–5)
TURBIDITY: CLEAR
URINE HGB: ABNORMAL
UROBILINOGEN, URINE: NORMAL
VANCOMYCIN TROUGH: 15.9 UG/ML (ref 10–20)
WBC # BLD: 3.5 K/UL (ref 3.5–11.3)
WBC UA: NORMAL /HPF (ref 0–5)
ZZ NTE CLEAN UP: ORDERED TEST: NORMAL
ZZ NTE WITH NAME CLEAN UP: SPECIMEN SOURCE: NORMAL

## 2023-01-01 PROCEDURE — 80202 ASSAY OF VANCOMYCIN: CPT

## 2023-01-01 PROCEDURE — 87040 BLOOD CULTURE FOR BACTERIA: CPT

## 2023-01-01 PROCEDURE — 85025 COMPLETE CBC W/AUTO DIFF WBC: CPT

## 2023-01-01 PROCEDURE — APPSS45 APP SPLIT SHARED TIME 31-45 MINUTES: Performed by: NURSE PRACTITIONER

## 2023-01-01 PROCEDURE — 73090 X-RAY EXAM OF FOREARM: CPT

## 2023-01-01 PROCEDURE — 83605 ASSAY OF LACTIC ACID: CPT

## 2023-01-01 PROCEDURE — 87205 SMEAR GRAM STAIN: CPT

## 2023-01-01 PROCEDURE — 2580000003 HC RX 258: Performed by: CLINICAL NURSE SPECIALIST

## 2023-01-01 PROCEDURE — 6360000002 HC RX W HCPCS: Performed by: INTERNAL MEDICINE

## 2023-01-01 PROCEDURE — 85610 PROTHROMBIN TIME: CPT

## 2023-01-01 PROCEDURE — 2580000003 HC RX 258: Performed by: INTERNAL MEDICINE

## 2023-01-01 PROCEDURE — 6370000000 HC RX 637 (ALT 250 FOR IP): Performed by: INTERNAL MEDICINE

## 2023-01-01 PROCEDURE — 94640 AIRWAY INHALATION TREATMENT: CPT

## 2023-01-01 PROCEDURE — 83735 ASSAY OF MAGNESIUM: CPT

## 2023-01-01 PROCEDURE — 99221 1ST HOSP IP/OBS SF/LOW 40: CPT | Performed by: OTOLARYNGOLOGY

## 2023-01-01 PROCEDURE — 83880 ASSAY OF NATRIURETIC PEPTIDE: CPT

## 2023-01-01 PROCEDURE — 6370000000 HC RX 637 (ALT 250 FOR IP): Performed by: NURSE PRACTITIONER

## 2023-01-01 PROCEDURE — 84439 ASSAY OF FREE THYROXINE: CPT

## 2023-01-01 PROCEDURE — 2500000003 HC RX 250 WO HCPCS: Performed by: INTERNAL MEDICINE

## 2023-01-01 PROCEDURE — 81001 URINALYSIS AUTO W/SCOPE: CPT

## 2023-01-01 PROCEDURE — 93005 ELECTROCARDIOGRAM TRACING: CPT | Performed by: INTERNAL MEDICINE

## 2023-01-01 PROCEDURE — 85014 HEMATOCRIT: CPT

## 2023-01-01 PROCEDURE — 36415 COLL VENOUS BLD VENIPUNCTURE: CPT

## 2023-01-01 PROCEDURE — 94761 N-INVAS EAR/PLS OXIMETRY MLT: CPT

## 2023-01-01 PROCEDURE — 84443 ASSAY THYROID STIM HORMONE: CPT

## 2023-01-01 PROCEDURE — 6360000002 HC RX W HCPCS: Performed by: NURSE PRACTITIONER

## 2023-01-01 PROCEDURE — 86140 C-REACTIVE PROTEIN: CPT

## 2023-01-01 PROCEDURE — 2700000000 HC OXYGEN THERAPY PER DAY

## 2023-01-01 PROCEDURE — 2060000000 HC ICU INTERMEDIATE R&B

## 2023-01-01 PROCEDURE — 82947 ASSAY GLUCOSE BLOOD QUANT: CPT

## 2023-01-01 PROCEDURE — 99232 SBSQ HOSP IP/OBS MODERATE 35: CPT | Performed by: INTERNAL MEDICINE

## 2023-01-01 PROCEDURE — 80053 COMPREHEN METABOLIC PANEL: CPT

## 2023-01-01 PROCEDURE — 85018 HEMOGLOBIN: CPT

## 2023-01-01 PROCEDURE — 2580000003 HC RX 258: Performed by: ANESTHESIOLOGY

## 2023-01-01 PROCEDURE — 87154 CUL TYP ID BLD PTHGN 6+ TRGT: CPT

## 2023-01-01 PROCEDURE — 93005 ELECTROCARDIOGRAM TRACING: CPT | Performed by: NURSE PRACTITIONER

## 2023-01-01 PROCEDURE — 83036 HEMOGLOBIN GLYCOSYLATED A1C: CPT

## 2023-01-01 PROCEDURE — 2580000003 HC RX 258: Performed by: NURSE PRACTITIONER

## 2023-01-01 RX ORDER — 0.9 % SODIUM CHLORIDE 0.9 %
500 INTRAVENOUS SOLUTION INTRAVENOUS ONCE
Status: DISCONTINUED | OUTPATIENT
Start: 2023-01-01 | End: 2023-01-01

## 2023-01-01 RX ORDER — SODIUM CHLORIDE 9 MG/ML
INJECTION, SOLUTION INTRAVENOUS CONTINUOUS
Status: DISCONTINUED | OUTPATIENT
Start: 2023-01-01 | End: 2023-01-03

## 2023-01-01 RX ORDER — INSULIN LISPRO 100 [IU]/ML
0-4 INJECTION, SOLUTION INTRAVENOUS; SUBCUTANEOUS
Status: DISCONTINUED | OUTPATIENT
Start: 2023-01-01 | End: 2023-01-11 | Stop reason: HOSPADM

## 2023-01-01 RX ORDER — 0.9 % SODIUM CHLORIDE 0.9 %
30 INTRAVENOUS SOLUTION INTRAVENOUS ONCE
Status: COMPLETED | OUTPATIENT
Start: 2023-01-01 | End: 2023-01-01

## 2023-01-01 RX ORDER — POTASSIUM CHLORIDE 7.45 MG/ML
10 INJECTION INTRAVENOUS
Status: COMPLETED | OUTPATIENT
Start: 2023-01-01 | End: 2023-01-01

## 2023-01-01 RX ORDER — METRONIDAZOLE 500 MG/100ML
500 INJECTION, SOLUTION INTRAVENOUS EVERY 8 HOURS
Status: DISCONTINUED | OUTPATIENT
Start: 2023-01-01 | End: 2023-01-03

## 2023-01-01 RX ORDER — SODIUM CHLORIDE 0.9 % (FLUSH) 0.9 %
5-40 SYRINGE (ML) INJECTION EVERY 12 HOURS SCHEDULED
Status: DISCONTINUED | OUTPATIENT
Start: 2023-01-01 | End: 2023-01-01 | Stop reason: SDUPTHER

## 2023-01-01 RX ORDER — POTASSIUM CHLORIDE 20 MEQ/1
10 TABLET, EXTENDED RELEASE ORAL 2 TIMES DAILY
Status: DISCONTINUED | OUTPATIENT
Start: 2023-01-01 | End: 2023-01-01

## 2023-01-01 RX ORDER — POTASSIUM CHLORIDE 20 MEQ/1
10 TABLET, EXTENDED RELEASE ORAL 2 TIMES DAILY
Status: DISCONTINUED | OUTPATIENT
Start: 2023-01-01 | End: 2023-01-10

## 2023-01-01 RX ORDER — SODIUM CHLORIDE, SODIUM LACTATE, POTASSIUM CHLORIDE, AND CALCIUM CHLORIDE .6; .31; .03; .02 G/100ML; G/100ML; G/100ML; G/100ML
500 INJECTION, SOLUTION INTRAVENOUS ONCE
Status: COMPLETED | OUTPATIENT
Start: 2023-01-01 | End: 2023-01-01

## 2023-01-01 RX ORDER — DEXTROSE MONOHYDRATE 100 MG/ML
INJECTION, SOLUTION INTRAVENOUS CONTINUOUS PRN
Status: DISCONTINUED | OUTPATIENT
Start: 2023-01-01 | End: 2023-01-11 | Stop reason: HOSPADM

## 2023-01-01 RX ORDER — SODIUM CHLORIDE 0.9 % (FLUSH) 0.9 %
5-40 SYRINGE (ML) INJECTION PRN
Status: DISCONTINUED | OUTPATIENT
Start: 2023-01-01 | End: 2023-01-01 | Stop reason: SDUPTHER

## 2023-01-01 RX ORDER — SODIUM CHLORIDE 9 MG/ML
INJECTION, SOLUTION INTRAVENOUS PRN
Status: DISCONTINUED | OUTPATIENT
Start: 2023-01-01 | End: 2023-01-01 | Stop reason: SDUPTHER

## 2023-01-01 RX ORDER — INSULIN LISPRO 100 [IU]/ML
0.05 INJECTION, SOLUTION INTRAVENOUS; SUBCUTANEOUS
Status: DISCONTINUED | OUTPATIENT
Start: 2023-01-01 | End: 2023-01-11 | Stop reason: HOSPADM

## 2023-01-01 RX ORDER — LORAZEPAM 2 MG/ML
1 INJECTION INTRAMUSCULAR EVERY 6 HOURS PRN
Status: DISCONTINUED | OUTPATIENT
Start: 2023-01-01 | End: 2023-01-03

## 2023-01-01 RX ORDER — INSULIN LISPRO 100 [IU]/ML
0-4 INJECTION, SOLUTION INTRAVENOUS; SUBCUTANEOUS NIGHTLY
Status: DISCONTINUED | OUTPATIENT
Start: 2023-01-01 | End: 2023-01-11 | Stop reason: HOSPADM

## 2023-01-01 RX ADMIN — METRONIDAZOLE 500 MG: 500 INJECTION, SOLUTION INTRAVENOUS at 16:34

## 2023-01-01 RX ADMIN — LACTULOSE 20 G: 20 SOLUTION ORAL at 16:29

## 2023-01-01 RX ADMIN — POTASSIUM CHLORIDE 10 MEQ: 1500 TABLET, EXTENDED RELEASE ORAL at 18:11

## 2023-01-01 RX ADMIN — Medication 1250 MG: at 05:04

## 2023-01-01 RX ADMIN — POTASSIUM CHLORIDE 10 MEQ: 7.46 INJECTION, SOLUTION INTRAVENOUS at 10:08

## 2023-01-01 RX ADMIN — Medication 1250 MG: at 17:47

## 2023-01-01 RX ADMIN — OCTREOTIDE ACETATE 50 MCG/HR: 500 INJECTION, SOLUTION INTRAVENOUS; SUBCUTANEOUS at 16:28

## 2023-01-01 RX ADMIN — BACLOFEN 10 MG: 10 TABLET ORAL at 07:57

## 2023-01-01 RX ADMIN — CEFEPIME 2000 MG: 2 INJECTION, POWDER, FOR SOLUTION INTRAVENOUS at 23:01

## 2023-01-01 RX ADMIN — CEFEPIME 2000 MG: 2 INJECTION, POWDER, FOR SOLUTION INTRAVENOUS at 06:11

## 2023-01-01 RX ADMIN — BACLOFEN 10 MG: 10 TABLET ORAL at 21:02

## 2023-01-01 RX ADMIN — SODIUM CHLORIDE 2595 ML: 9 INJECTION, SOLUTION INTRAVENOUS at 16:07

## 2023-01-01 RX ADMIN — POTASSIUM CHLORIDE 10 MEQ: 7.46 INJECTION, SOLUTION INTRAVENOUS at 12:13

## 2023-01-01 RX ADMIN — IPRATROPIUM BROMIDE AND ALBUTEROL SULFATE 1 AMPULE: .5; 3 SOLUTION RESPIRATORY (INHALATION) at 07:37

## 2023-01-01 RX ADMIN — IPRATROPIUM BROMIDE AND ALBUTEROL SULFATE 1 AMPULE: .5; 3 SOLUTION RESPIRATORY (INHALATION) at 15:59

## 2023-01-01 RX ADMIN — LORAZEPAM 1 MG: 2 INJECTION INTRAMUSCULAR at 19:25

## 2023-01-01 RX ADMIN — MAGNESIUM SULFATE HEPTAHYDRATE 2000 MG: 40 INJECTION, SOLUTION INTRAVENOUS at 10:18

## 2023-01-01 RX ADMIN — IPRATROPIUM BROMIDE AND ALBUTEROL SULFATE 1 AMPULE: .5; 3 SOLUTION RESPIRATORY (INHALATION) at 19:45

## 2023-01-01 RX ADMIN — LACTULOSE 20 G: 20 SOLUTION ORAL at 07:57

## 2023-01-01 RX ADMIN — LACTULOSE 20 G: 20 SOLUTION ORAL at 12:12

## 2023-01-01 RX ADMIN — INSULIN LISPRO 4 UNITS: 100 INJECTION, SOLUTION INTRAVENOUS; SUBCUTANEOUS at 17:32

## 2023-01-01 RX ADMIN — LACTULOSE 20 G: 20 SOLUTION ORAL at 04:59

## 2023-01-01 RX ADMIN — SODIUM CHLORIDE, PRESERVATIVE FREE 10 ML: 5 INJECTION INTRAVENOUS at 21:10

## 2023-01-01 RX ADMIN — LACTULOSE 20 G: 20 SOLUTION ORAL at 21:03

## 2023-01-01 RX ADMIN — SODIUM CHLORIDE, POTASSIUM CHLORIDE, SODIUM LACTATE AND CALCIUM CHLORIDE 500 ML: 600; 310; 30; 20 INJECTION, SOLUTION INTRAVENOUS at 14:42

## 2023-01-01 RX ADMIN — SODIUM CHLORIDE: 9 INJECTION, SOLUTION INTRAVENOUS at 21:07

## 2023-01-01 RX ADMIN — LACTULOSE 20 G: 20 SOLUTION ORAL at 00:00

## 2023-01-01 RX ADMIN — Medication 100 MG: at 07:57

## 2023-01-01 RX ADMIN — SODIUM CHLORIDE, PRESERVATIVE FREE 10 ML: 5 INJECTION INTRAVENOUS at 21:02

## 2023-01-01 RX ADMIN — IPRATROPIUM BROMIDE AND ALBUTEROL SULFATE 1 AMPULE: .5; 3 SOLUTION RESPIRATORY (INHALATION) at 11:48

## 2023-01-01 RX ADMIN — SODIUM CHLORIDE: 9 INJECTION, SOLUTION INTRAVENOUS at 14:02

## 2023-01-01 RX ADMIN — METRONIDAZOLE 500 MG: 500 INJECTION, SOLUTION INTRAVENOUS at 23:06

## 2023-01-01 RX ADMIN — CEFEPIME 2000 MG: 2 INJECTION, POWDER, FOR SOLUTION INTRAVENOUS at 16:19

## 2023-01-01 NOTE — PLAN OF CARE
Problem: Discharge Planning  Goal: Discharge to home or other facility with appropriate resources  1/1/2023 0934 by Mohamud Ceballos RN  Outcome: Progressing  1/1/2023 0437 by Signa Libman, RN  Outcome: Progressing     Problem: Pain  Goal: Verbalizes/displays adequate comfort level or baseline comfort level  1/1/2023 0934 by Mohamud Ceballos RN  Outcome: Progressing  1/1/2023 0437 by Signa Libman, RN  Outcome: Progressing     Problem: Respiratory - Adult  Goal: Achieves optimal ventilation and oxygenation  1/1/2023 0934 by Mohamud Ceballos RN  Outcome: Progressing  1/1/2023 0437 by Signa Libman, RN  Outcome: Progressing     Problem: Cardiovascular - Adult  Goal: Maintains optimal cardiac output and hemodynamic stability  1/1/2023 0934 by Mohamud Ceballos RN  Outcome: Progressing  1/1/2023 0437 by Signa Libman, RN  Outcome: Progressing  Goal: Absence of cardiac dysrhythmias or at baseline  1/1/2023 0934 by Mohamud Ceballos RN  Outcome: Progressing  1/1/2023 0437 by Signa Libman, RN  Outcome: Progressing     Problem: Musculoskeletal - Adult  Goal: Return mobility to safest level of function  1/1/2023 0934 by Mohamud Ceballos RN  Outcome: Progressing  1/1/2023 0437 by Signa Libman, RN  Outcome: Progressing  Goal: Maintain proper alignment of affected body part  1/1/2023 0934 by Mohamud Ceballos RN  Outcome: Progressing  1/1/2023 0437 by Signa Libman, RN  Outcome: Progressing  Goal: Return ADL status to a safe level of function  1/1/2023 0934 by Mohamud Ceballos RN  Outcome: Progressing  1/1/2023 0437 by Signa Libman, RN  Outcome: Progressing     Problem: Gastrointestinal - Adult  Goal: Minimal or absence of nausea and vomiting  1/1/2023 0934 by Mohamud Ceballos RN  Outcome: Progressing  1/1/2023 0437 by Signa Libman, RN  Outcome: Progressing  Goal: Maintains or returns to baseline bowel function  1/1/2023 0934 by Mohamud Ceballos RN  Outcome: Progressing  1/1/2023 0437 by Signa Libman, RN  Outcome: Progressing     Problem: Hematologic - Adult  Goal: Maintains hematologic stability  1/1/2023 0934 by Roc Saldana RN  Outcome: Progressing  1/1/2023 0437 by Vilma Curtis RN  Outcome: Progressing     Problem: Skin/Tissue Integrity  Goal: Absence of new skin breakdown  Description: 1. Monitor for areas of redness and/or skin breakdown  2. Assess vascular access sites hourly  3. Every 4-6 hours minimum:  Change oxygen saturation probe site  4. Every 4-6 hours:  If on nasal continuous positive airway pressure, respiratory therapy assess nares and determine need for appliance change or resting period. 1/1/2023 0934 by Roc Saldana RN  Outcome: Progressing  1/1/2023 0437 by Vilma Curtis RN  Outcome: Progressing     Problem: Safety - Adult  Goal: Free from fall injury  1/1/2023 0934 by Roc Saldana RN  Outcome: Progressing  1/1/2023 0437 by Vilma Curtis RN  Outcome: Progressing     Problem: ABCDS Injury Assessment  Goal: Absence of physical injury  1/1/2023 0934 by Roc Saldana RN  Outcome: Progressing  1/1/2023 0437 by Vilma Curtis RN  Outcome: Progressing     Problem: Safety - Medical Restraint  Goal: Remains free of injury from restraints (Restraint for Interference with Medical Device)  Description: INTERVENTIONS:  1. Determine that other, less restrictive measures have been tried or would not be effective before applying the restraint  2. Evaluate the patient's condition at the time of restraint application  3. Inform patient/family regarding the reason for restraint  4.  Q2H: Monitor safety, psychosocial status, comfort, nutrition and hydration  1/1/2023 0934 by Roc Saldana RN  Outcome: Progressing  1/1/2023 0437 by Vilma Curtis RN  Outcome: Progressing

## 2023-01-01 NOTE — PROGRESS NOTES
[facility]. [facility]   Gastroenterology Progress Note    Fred Rosenberg is a 60 y.o. male patient. Hospitalization Day:4      Chief consult reason:   Evaluation for GI bleeding, melena    Subjective:  Patient seen and examined. Patient is out of soft restraints, still not oriented to time, circumstances or location but does engage in temporary sentences. Denies any abdominal pain, hematemesis bloody stools. Patient was started on lactulose and having frequent stools  LFTs relatively same  Hemoglobin stable 7.9 g/dL, platelets this a.m. 84, INR 1.3    VITALS:  /64   Pulse (!) 109   Temp 98.2 °F (36.8 °C) (Oral)   Resp 28   Ht 5' 10\" (1.778 m)   Wt 190 lb 10.6 oz (86.5 kg)   SpO2 95%   BMI 27.36 kg/m²   TEMPERATURE:  Current - Temp: 98.2 °F (36.8 °C); Max - Temp  Av °F (36.7 °C)  Min: 97.8 °F (36.6 °C)  Max: 98.3 °F (36.8 °C)    Physical Assessment:  General appearance: uncooperative, confused  Mental Status:  oriented to person  Lungs:  clear to auscultation bilaterally, normal effort  Heart:  regular rate and rhythm, no murmur  Abdomen:  soft, nontender, slightly distended, normal bowel sounds, no masses, hepatomegaly, splenomegaly  Extremities:  no edema, redness, tenderness in the calves  Skin:  no gross lesions, rashes, induration    Data Review:    Labs and Imaging:     CBC:  Recent Labs     22  0135 22  0556 22  0545 22  1650 23  0343 23  0625 23  1620   WBC 5.7 5.7 4.5  --   --  3.5  --    HGB 8.7* 8.6* 7.6* 7.4* 8.7* 8.0* 7.9*   MCV 84.0 85.8 87.3  --   --  89.5  --    RDW 18.3* 18.9* 19.6*  --   --  20.2*  --    PLT 88* 90* 88*  --   --  84*  --          ANEMIA STUDIES:  No results for input(s): LABIRON, TIBC, FERRITIN, CLNUEHKS63, FOLATE, OCCULTBLD in the last 72 hours.       BMP:  Recent Labs     23  0625 23  1458 23  1619    138 140   K 3.1* 3.3* 3.4*   * 109* 110*   CO2 22 17* 18*   BUN 7* 6* 6* CREATININE 0.48* 0.55* 0.53*   GLUCOSE 158* 260* 257*   CALCIUM 7.6* 7.6* 7.6*         LFTS:  Recent Labs     12/30/22  0135 12/30/22  0556 12/31/22  0545 01/01/23  0625 01/01/23  1458 01/01/23  1619   ALKPHOS 144* 141*  --  110 111 113   ALT 13 14  --  23 27 27   AST 36 38  --  84* 98* 101*   BILITOT 4.7* 4.7*  --  5.2* 4.8* 4.9*   BILIDIR  --   --  4.1*  --   --   --    LABALBU 2.5* 2.7*  --  2.2* 2.1* 2.2*         Amylase/Lipase and Ammonia:  Recent Labs     12/30/22 0135 12/31/22  0545   AMMONIA 53 49         Acute Hepatitis Panel:  Lab Results   Component Value Date/Time    HEPBSAG NONREACTIVE 12/23/2020 05:09 PM    HEPCAB REACTIVE 12/23/2020 05:09 PM    HEPBIGM NONREACTIVE 12/23/2020 05:09 PM    HEPAIGM NONREACTIVE 08/19/2021 01:29 PM       HCV Genotype:  Lab Results   Component Value Date/Time    HEPATITISCGENOTYPE Mixed 09/06/2016 02:07 PM       HCV Quantitative:  Lab Results   Component Value Date/Time    HCVQNT NOT REPORTED 09/06/2016 02:07 PM       LIVER WORK UP:    AFP  Lab Results   Component Value Date/Time    AFP 2.5 04/13/2022 03:37 PM       Alpha 1 antitrypsin   Lab Results   Component Value Date/Time    A1A 152 08/19/2021 01:29 PM       MARYSOL  Lab Results   Component Value Date/Time    MARYSOL NEGATIVE 07/21/2016 09:29 PM       AMA  Lab Results   Component Value Date/Time    MITOAB 2.6 07/21/2016 09:29 PM       ASMA  No results found for: SMOOTHMUSCAB    PT/INR  Recent Labs     12/31/22  0545 01/01/23  0625   PROTIME 14.1* 14.0*   INR 1.4 1.3         Cancer Markers:  CEA:  No results for input(s): CEA in the last 72 hours. Ca 125:  No results for input(s):  in the last 72 hours. Ca 19-9:   Invalid input(s):   AFP: No results for input(s): AFP in the last 72 hours.   Lactic acid:Invalid input(s): LACTIC ACID    Radiology Review:    US LIVER    Result Date: 12/28/2022  EXAMINATION: RIGHT UPPER QUADRANT ULTRASOUND 12/28/2022 4:16 pm COMPARISON: CT abdomen 12/27/2022 HISTORY: ORDERING SYSTEM PROVIDED HISTORY: check patency of TIPS FINDINGS: LIVER:  The liver demonstrates heterogeneous, multinodular pattern echogenicity without dominant hepatic lesion demonstrated sonographically. No evidence of intrahepatic biliary ductal dilatation. TIPS: Duplex and Doppler waveform imaging of the tips was performed with the following flow velocities: Distal 30.6 cm/second, mid 29.1 cm/second, proximal 33.6 cm/second. BILIARY SYSTEM:  Gallbladder demonstrates a number of layering, echogenic shadowing stones. No wall thickening evident. Negative sonographic Silva's sign. Common bile duct is within normal limits measuring 3 mm. RIGHT KIDNEY: The right kidney is grossly unremarkable without evidence of hydronephrosis. Right kidney long dimension 11.2 cm. PANCREAS:  Visualized portions of the pancreas are unremarkable. OTHER: Small volume right upper quadrant ascites. 1.  Cholelithiasis without definite findings of acute cholecystitis. 2. Patent TIPS. Specific velocities recorded above. 3. Small volume abdominal ascites. 4. Hepatic morphologic features typical of cirrhosis. Active Problems:    Ascites due to alcoholic cirrhosis (HCC)    Shortness of breath    Portal hypertension (HCC)    Esophageal varices with bleeding (HCC)    Other abnormalities of gait and mobility    S/P TIPS (transjugular intrahepatic portosystemic shunt)    Acute blood loss anemia    Near syncope    Bleeding gastric varices    Hepatic encephalopathy    Periumbilical abdominal pain    Lezama's esophagus with dysplasia    Acute hyperactive alcohol withdrawal delirium (HCC)    Decompensated hepatic cirrhosis (HCC)    DDD (degenerative disc disease), lumbar    Acute upper GI hemorrhage    Gastrointestinal hemorrhage with melena    Mastoid disorder, bilateral  Resolved Problems:    * No resolved hospital problems.  *       GI Impression:    Decompensated liver cirrhosis s/p TIPS revision 12/30/2022 with upsizing of stent and embolization of left gastric vein. Hgb dropped slightly to 7.6 g/dL. -No overt bleeding noted  -Per IR note-if patient continues to have GI bleeding may consider alternative procedure BATO per IR  Grade 2 encephalopathy-secondary to underlying cirrhosis, recent TIPS revision  Esophageal and gastric varices s/p EGD 12/29/2022  Refractory ascites secondary to underlying liver disease, recent TIPS  Hyponatremia most likely secondary to underlying cirrhosis-resolved  Thrombocytopenia secondary to underlying cirrhosis      Plan and Recommendations: Will evaluate in a.m. for repeat paracentesis  Continue lactulose 20 g every 4 hours. Liquid diet with supplements 3 times daily  Daily labs including CBC BMP LFT INR  Monitor closely for acute decompensation  Monitor closely for electrolyte imbalance and treat accordingly  Will follow        This plan was formulated in collaboration with Dr. Kenny Cordero MD    Thank you for allowing me to participate in the care of your patient. Please feel free to contact me with any questions or concerns. [address] [facility]   [facility]   [phone]  1/1/2023  5:29 PM    Estimated time of 45 mins reviewing chart, assessing patient and formulating plan of care    This note was created with the assistance of a speech-recognition program.  Although the intention is to generate a document that actually reflects the content of the visit, no guarantees can be provided that every mistake has been identified and corrected by editing.

## 2023-01-01 NOTE — PLAN OF CARE
Problem: Respiratory - Adult  Goal: Achieves optimal ventilation and oxygenation  1/1/2023 1608 by Shawna Daniel RCP  Outcome: Progressing  Flowsheets (Taken 1/1/2023 1608)  Achieves optimal ventilation and oxygenation:   Assess for changes in respiratory status   Assess the need for suctioning and aspirate as needed   Respiratory therapy support as indicated   Assess for changes in mentation and behavior   Oxygen supplementation based on oxygen saturation or arterial blood gases   Encourage broncho-pulmonary hygiene including cough, deep breathe, incentive spirometry   Assess and instruct to report shortness of breath or any respiratory difficulty

## 2023-01-01 NOTE — PROGRESS NOTES
9747: writer sent message via perfect serve to Dr. Taqueria Guzman to clarify order for potassium of 3.1. Dr. Taqueria Guzman states she only wants 20 mEq to be given. [phone]: Dr. Taqueria Guzman notified via [employer] patient triggered BPA for sepsis. Lactic acid ordered. 1424: Dr. Taqueria Guzman notified via [employer] lactic acid 4.5, right arm increased swelling. See orders placed. 1643: Dr. Taqueria Guzman notified potassium 3.3. 10mEq BID start tomorrow. Writer messaged to clarify to start today. Order changed. 1812: Dr. Taqueria Guzman notified , /82. Fingersticks, novolog sliding scale, ativan ordered. Patient denying anxiety/agitation/irritation. Ativan held.

## 2023-01-01 NOTE — PROGRESS NOTES
[facility]  Office: [address], DO, Aniceto Grey, DO, Gume Rm, DO, Meagan Pak Blood, DO, Indio Bermudez MD, Gudreep Muse MD, Speedy So MD, Ananda Mo MD,  Kathy Howe MD, Augustine Sepulveda MD, Ari Layer, DO, Hermann Tuttle MD,  Kwabena Bowden MD, kAira Moore MD, Lev Ortiz, DO, Guillermina Muhammad MD, Afshan Almanzar MD, Sadi Montes, DO, Theresa Dill MD, Deep Peacock MD, Alesia Clements MD, Diego James MD, James Ramírez, DO, Esmer Waldron MD, Sohan Sosa MD, Oz Guzman [facility],  Dany Chua, CNP, Craig Sever, CNP, Ham Hunt, CNP,  Charanjit Caceres, [facility], Jenna Fowler, CNP, Michelle Christianson, CNP, oRberto Devi CNP, Todd Guo, [facility], [facility], CNP, Geri Fraser PA-C, Osmel Hope, CNS, Clarisa Medrano, CNP, Dhaval Brandie, [facility]    Progress Note    1/1/2023    3:29 PM    Name:   Nancy Monsalve  MRN:     [id number]     [place]:      [de-identified]   Room:   [address] [place] [address] Day:  4  Admit Date:  12/28/2022 11:30 PM    PCP:   VASU Gross  Code Status:  Full Code    Subjective:     C/C: Swollen right forearm with recent history of fall with IV site in right arm  Removal, sepsis lactic acid 4.5, aspiration pneumonia, history of GI bleed with severe anemia requiring 3 units of blood with underlying history of Lezama's esophagus/esophageal cancer with large varices with moderate portal hypertensive gastropathy. Patient was obtunded with slow improvement in mentation. He was able to speak to me this morning and is now aware of what has happened in the last few days and is orientated to person and place which he was not before. History of tremors alcohol abuse. Advised for outpatient neurology follow-up.   He complains of being fatigued and worn out with chronic tremors of his hand for which she has been advised to be seen by outpatient neurology for further management with suggestive symptoms of cerebellar dysfunction secondary to his chronic alcohol abuse  Repeat lactic acid increased to 6.6, from 4.5. Normal saline at 30 cc/kg initiated with maintenance fluid at 150 cc thereafter with lactic acid every 3 hours. Interval History Status: worsened. Brief History:     As above    Review of Systems:     Unable to perform at prior to mentation    Medications:      Allergies:  No Known Allergies    Current Meds:   Scheduled Meds:    lactated ringers bolus  500 mL IntraVENous Once    metroNIDAZOLE  500 mg IntraVENous Q8H    cefepime  2,000 mg IntraVENous Q8H    lactulose  20 g Oral 6 times per day    ipratropium-albuterol  1 ampule Inhalation 4x daily    sodium chloride flush  5-40 mL IntraVENous 2 times per day    sodium chloride flush  5-40 mL IntraVENous 2 times per day    thiamine  100 mg Oral Daily    baclofen  10 mg Oral TID    sodium chloride flush  5-40 mL IntraVENous 2 times per day    vancomycin (VANCOCIN) intermittent dosing (placeholder)   Other RX Placeholder    vancomycin  1,750 mg IntraVENous Once    Followed by    vancomycin  1,250 mg IntraVENous Q12H    sodium chloride flush  5-40 mL IntraVENous 2 times per day     Continuous Infusions:    sodium chloride      sodium chloride      sodium chloride      sodium chloride      sodium chloride      sodium chloride      octreotide (SandoSTATIN) infusion 40 mcg/hr (01/01/23 1232)     PRN Meds: magnesium sulfate, sodium chloride flush, sodium chloride, hydrALAZINE, OLANZapine (ZyPREXA) in sterile water IntraMUSCular, sodium chloride flush, sodium chloride, LORazepam **OR** LORazepam **OR** LORazepam **OR** LORazepam **OR** LORazepam **OR** LORazepam **OR** LORazepam **OR** LORazepam, sodium chloride flush, sodium chloride, sodium chloride, LORazepam, potassium chloride, diphenhydrAMINE, sodium chloride, sodium chloride flush, ondansetron **OR** ondansetron    Data:     Past Medical History:   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 ([facility] Alcoholic cirrhosis of liver with ascites ([facility] Anemia, Anxiety, Arthritis, Back pain, chronic, Lezama esophagus, BPH (benign prostatic hypertrophy), Cholelithiasis, Cirrhosis ([address] COVID-19, COVID-19 vaccine series completed, DDD (degenerative disc disease), lumbar, Depression, Esophageal cancer ([facility] Esophageal varices ([facility] Fatty liver, GERD (gastroesophageal reflux disease), GI bleed, Hep C w/o coma, chronic ([facility] History of alcohol abuse, History of blood transfusion, History of colon polyps, History of tobacco abuse, [place] (hard of hearing), Hyperlipidemia, Hypertension, Hyponatremia, Hypotension, Mastoid disorder, bilateral, PONV (postoperative nausea and vomiting), Port-A-Cath in place, Portal hypertension ([facility] Sciatica, Secondary esophageal varices ([facility] Shortness of breath, Spinal stenosis, Stomach ulcer, Thrombocytopenia ([facility] Tubular adenoma of colon, Vitamin D deficiency, and Wears glasses. Social History:   reports that he quit smoking about 5 years ago. His smoking use included cigarettes. He has a 45.00 pack-year smoking history. He has never used smokeless tobacco. He reports that he does not currently use alcohol. He reports that he does not currently use drugs after having used the following drugs: Cocaine. Frequency: 1.00 time per week.      Family History:   Family History   Problem Relation Age of Onset    Cancer Mother         pancreatic    Cancer Father         bone    Diabetes Sister     Asthma Brother     Allergies Brother         MULTIPLE       Vitals:  /71   Pulse (!) 104   Temp 98 °F (36.7 °C) (Oral)   Resp 19   Ht 5' 10\" (1.778 m)   Wt 190 lb 10.6 oz (86.5 kg)   SpO2 96%   BMI 27.36 kg/m²   Temp (24hrs), Av °F (36.7 °C), Min:97.8 °F (36.6 °C), Max:98.3 °F (36.8 °C)    Recent Labs     22  2314 22  0808 22  1222 22  1945   POCGLU 93 75 82 167*       I/O (24Hr):     Intake/Output Summary (Last 24 hours) at 1/1/2023 1529  Last data filed at 1/1/2023 1232  Gross per 24 hour   Intake 3251.46 ml   Output 750 ml   Net 2501.46 ml       Labs:  Hematology:  Recent Labs     12/30/22  0556 12/31/22  0545 12/31/22  1650 01/01/23  0343 01/01/23  0625   WBC 5.7 4.5  --   --  3.5   RBC 3.37* 3.00*  --   --  3.13*   HGB 8.6* 7.6* 7.4* 8.7* 8.0*   HCT 28.9* 26.2* 25.7* 29.2* 28.0*   MCV 85.8 87.3  --   --  89.5   MCH 25.5 25.3  --   --  25.6   MCHC 29.8 29.0  --   --  28.6   RDW 18.9* 19.6*  --   --  20.2*   PLT 90* 88*  --   --  84*   MPV 9.8 9.8  --   --  10.0   CRP 19.5* 23.3*  --   --   --    INR  --  1.4  --   --  1.3     Chemistry:  Recent Labs     12/30/22  0135 12/30/22  0556 12/30/22  1106 12/31/22  0545 01/01/23  0625 01/01/23  1325    135  --   --  140  --    K 3.5* 3.6*  --   --  3.1*  --     101  --   --  111*  --    CO2 18* 17*  --   --  22  --    GLUCOSE 84 82  --   --  158*  --    BUN 12 11  --   --  7*  --    CREATININE 0.67* 0.72  --   --  0.48*  --    MG  --   --   --  1.4* 1.6  --    ANIONGAP 15 17  --   --  7*  --    LABGLOM >60 >60  --   --  >60  --    CALCIUM 7.9* 7.8*  --   --  7.6*  --    PHOS  --  3.2  --  3.6  --   --    PROBNP  --  556*  --   --   --   --    LACTACIDWB  --   --  1.1  --   --  4.5*     Recent Labs     12/30/22  0135 12/30/22  0544 12/30/22  0556 12/30/22  2314 12/31/22  0545 12/31/22  0808 12/31/22  1222 12/31/22  1945 01/01/23  0625   PROT 4.6*  --  4.7*  --   --   --   --   --  4.2*   LABALBU 2.5*  --  2.7*  --   --   --   --   --  2.2*   AST 36  --  38  --   --   --   --   --  84*   ALT 13  --  14  --   --   --   --   --  23   ALKPHOS 144*  --  141*  --   --   --   --   --  110   BILITOT 4.7*  --  4.7*  --   --   --   --   --  5.2*   BILIDIR  --   --   --   --  4.1*  --   --   --   --    AMMONIA 53  --   --   --  49  --   --   --   --    POCGLU  --  79  --  93  --  75 82 167*  --      ABG:  Lab Results   Component Value Date/Time    FIO2 INFORMATION NOT PROVIDED 12/30/2022 01:35 AM     Lab Results   Component Value Date/Time    SPECIAL NOT REPORTED 02/02/2022 11:28 AM     Lab Results   Component Value Date/Time    CULTURE NO GROWTH 12/30/2022 10:36 PM       Radiology:  XR CHEST (SINGLE VIEW FRONTAL)    Result Date: 12/27/2022  1. Minimal bibasilar atelectatic changes with trace left pleural fluid which is slightly decreased from 12/09/2022. CT HEAD WO CONTRAST    Result Date: 12/31/2022  CT BRAIN: 1. No evidence of acute hemorrhage, large territorial hypodensity, significant mass effect/midline shift, or ventriculomegaly 2. Involutional parenchymal changes. 3. Age-indeterminate (likely chronic) left basal ganglia lacunar infarct. If clinically indicated, can consider further evaluation with MRI if no contraindications. CT CERVICAL SPINE: 1. No acute abnormality of the cervical spine. 2. Multilevel cervical spondylosis, most notable at C5-C6 and C6-C7. No high-grade bony spinal canal stenosis. CT CERVICAL SPINE WO CONTRAST    Result Date: 12/31/2022  CT BRAIN: 1. No evidence of acute hemorrhage, large territorial hypodensity, significant mass effect/midline shift, or ventriculomegaly 2. Involutional parenchymal changes. 3. Age-indeterminate (likely chronic) left basal ganglia lacunar infarct. If clinically indicated, can consider further evaluation with MRI if no contraindications. CT CERVICAL SPINE: 1. No acute abnormality of the cervical spine. 2. Multilevel cervical spondylosis, most notable at C5-C6 and C6-C7. No high-grade bony spinal canal stenosis. CT ABDOMEN PELVIS W IV CONTRAST Additional Contrast? None    Result Date: 12/27/2022  Stable circumferential wall thickening of the distal esophagus and gastroesophageal junction consistent with known esophageal malignancy. No significant change since prior examination. No esophageal obstruction.  Cirrhotic liver with associated portal venous hypertension, progressive ascites and stable splenomegaly. TIPS appears in place and patent Cholelithiasis Interval development of bibasal infiltrates and moderate bilateral pleural effusions as well as mild-to-moderate pericardial effusion     IR FLUORO GUIDED NEEDLE PLACEMENT    Result Date: 12/28/2022  Successful ultrasound-guided placement of a right upper extremity midline venous catheter. US LIVER    Result Date: 12/28/2022  1. Cholelithiasis without definite findings of acute cholecystitis. 2. Patent TIPS. Specific velocities recorded above. 3. Small volume abdominal ascites. 4. Hepatic morphologic features typical of cirrhosis. US GALLBLADDER RUQ    Result Date: 12/30/2022  Multiple cholelithiasis but no ultrasound evidence cholecystitis. Nondilated biliary tree. Cirrhosis. Patent TIPS. Ascites. Additional findings, as above. XR CHEST PORTABLE    Result Date: 12/31/2022  Little change from prior study. Cardiomegaly, vascular congestion, effusions, atelectasis and possible superimposed acute airspace disease are again noted. XR CHEST PORTABLE    Result Date: 12/30/2022  Cardiomegaly with probable mild central vascular congestion and similar bilateral pleural effusions with compressive atelectasis; filtrate at either base a consideration. No pulmonary edema. IR US GUIDED PARACENTESIS    Result Date: 12/28/2022  Successful ultrasound-guided therapeutic and diagnostic paracentesis. IR REVISION TIPS    Result Date: 12/31/2022  TIPS stent reassessment showing 6 mm Hg portosystemic shunt; no significant variceal filling demonstrated, but successful left gastric vein embolization performed. Stent angioplasty to 10 mm with excellent flow demonstrated. The findings were discussed with Dr. Iveth Frias post procedure. MRI BRAIN WO CONTRAST    Result Date: 12/31/2022  No evidence of acute ischemia. New moderate-sized bilateral mastoid effusions. Small old lacune within the left basal ganglia.  New low T1 signal within the marrow elements diffusely likely representing a marrow replacement process such as anemia. Physical Examination:      General appearance: Patient is more awake but still somewhat confused.   Noted to have marked swelling of the right forearm with right IV site needs further attention and discontinuation  Mental Status: Awake , notalert  Lungs: Scattered rhonchi on deep inspiratory auscultation bilaterally, normal effort  Heart:  regular rate and rhythm, no murmur  Abdomen:  soft, t slight tender in the right upper quadrant as evident by his furrowing of eye browson palpation, distended, normal bowel sounds, no masses, hepatomegaly, splenomegaly  Extremities: Right upper forearm-red warm swollen with IV site needing changing with tenderness for further x-ray evaluation due to recent fall no edema, redness, tenderness in the calves  Skin: Patchy erythema of right forearm and wrist noted slightly warm and    Assessment:        Hospital Problems             Last Modified POA    Ascites due to alcoholic cirrhosis ([address] 58/16/8689 Yes    Shortness of breath 12/29/2022 Yes    Portal hypertension ([address] 12/29/2022 Yes    Esophageal varices with bleeding ([address] 12/29/2022 Yes    Other abnormalities of gait and mobility 12/29/2022 Yes    S/P TIPS (transjugular intrahepatic portosystemic shunt) 12/29/2022 Yes    Acute blood loss anemia 12/29/2022 Yes    Near syncope 12/29/2022 Yes    Bleeding gastric varices 12/29/2022 Yes    Hepatic encephalopathy 13/77/5639 Yes    Periumbilical abdominal pain 12/29/2022 Yes    Lezama's esophagus with dysplasia 12/30/2022 Yes    Mastoid disorder, bilateral 12/31/2022 Yes    Overview Signed 12/31/2022  4:50 PM by Salina Morales MD     biLateral mastoid effusion         Acute hyperactive alcohol withdrawal delirium ([address] 12/30/2022 Yes    Decompensated hepatic cirrhosis ([address] 12/29/2022 Yes    DDD (degenerative disc disease), lumbar 12/29/2022 Yes    Acute upper GI hemorrhage 12/29/2022 Yes    Gastrointestinal hemorrhage with melena 12/29/2022 Yes   56-year-old gentleman with known history of chronic alcohol abuse admitted with acute GI bleed with Lezama's esophagus Lezama's esophagus/esophageal cancer esophageal cancer with upper endoscopy revealing moderately large varices with moderate portal hypertensive gastropathy. Status post therapeutic paracentesis 4.5 L with positive leukocytosis on IV Zosyn for SBP prophylaxis which was switched to IV cefepime/vancomycin for pneumonia with high risk for Pseudomonas and MRSA. Lavanda Candle 3 units blood transfusion. Chest x-ray showed bilateral pneumonia with mild to moderate pericardial effusion with recent echocardiogram showing no confirmation. IV antibiotics changed from Zosyn to cefepime and vancomycin for aspiration pneumonia coverage with possible complication from Pseudomonas due to his esophageal cancer history. Elevated CRP 19.5 which is rising to 23.3. Elevated total bilirubin 4.7 for further work-up for biliary obstruction with HIDA scan and bilirubin fractionation. Confirmed cholelithiasis. Without cholecystitis  Episode of fall with decreased mentation with CT of head and neck and MRI of head showed old lacunar infarct of left basal ganglia, bilateral mastoid effusion discussed with ENT and confirmed on MRI of brain. Mildly elevated ammonia level. Principal diagnosis:  #History of GI bleed secondary to portal hypertension with alcoholic cirrhosis with moderate to large varices requiring 3 units of blood with restrictive transfusion to maintain hemoglobin at 8. Placed on IV Zosyn for SBP prophylaxis for ascites removed 4.5 L with therapeutic paracentesis. Status post revision of TIPS. DC'd IV Zosyn currently and IV cefepime/vancomycin due to aspiration pneumonia continue IV pantoprazole, octreotide. Lactulose.     Active diagnoses  #Sepsis with elevated lactic acid 4.4 rising to 6.6 started on normal saline bolus 30 cc/kg and continuous at 150 cc/h per sepsis protocol initiated. Flagyl added due to suspected abdominal source for infection. Urinalysis pending. #Bilateral basilar pneumonia with high risk for aspiration with consideration for Pseudomonas pneumonia. Change IV Zosyn to IV cefepime and vancomycin with pharmacy to dose pending MRSA of nose. Elevated CRP was 19.5 rising to 23.3. Pending procalcitonin level. Follow-up chest x-ray. Sputum culture and sensitivity. elevated respiration 33/min pulse 124/min. Elevated lactic acid level 4.4 now 6.6 with IV normal saline bolus initiated and IV Flagyl added to cefepime, vancomycin    #Elevated bilirubin total bilirubin 4.7 with suspected right upper quadrant pain on palpation. Pending ultrasound of gallbladder. Direct bilirubin alkaline phosphatase HIDA scan on IV cefepime GI consult appreciated. #Elevated ammonia level-lactulose. #History of unwitnessed fall with decreased mentation-stat CT of head and C-spine to rule out intracranial bleed-results reviewed  #Suggestive protein calorie malnutrition with total protein 4.7, albumin 2.7.-Enteral hyperalimentation. #Chronic alcohol abuse-currently on Ativan for withdrawal with visual hallucinations drinking 6 beers a day for the last 50 years. Continue CIWA score monitor patient closely. #New onset swelling of right forearm with IV line and right arm which needs to be discontinued and changed. X-ray of right forearm due to history of fall to rule out fracture. #Sinus tachycardia confirmed on EKG. Due to current sepsis being actively treated    Plan:        Continue IV cefepime/vancomycin with pharmacy to dose for bilateral pneumonia. Flagyl added to regimen due to sepsis with rising lactic acid from 4.4 to currently 6.6 with sepsis protocol initiated with normal saline bolus at 30cc/kg with 150 cc/h with lactic acid every 3 hours.   To continue IV fluids until resolution of lactic acid level -target goal of 2.0  2. History of recent GI bleed requiring 3 units of blood transfusion secondary to portal hypertension-continue IV pantoprazole/octreotide/lactulose. 3.  Old left basal ganglia lacunar infarct for follow-up outpatient with neurology with PT/OT to evaluate patient once he is more stable. 4.  Elevated ammonia-continue lactulose  5. Hypomagnesemia 1.4 replaced  6. Nutrition consult-supplement  Full code resuscitation  Condition guarded  Evaluate for skilled nursing upon discharge with fall risk assessment by inpatient PT OT once he is more stable.   Patient needs bedside range of motion first.  Consider bed to chair once patient is able to tolerate    Viki Farooq MD  1/1/2023  3:29 PM

## 2023-01-01 NOTE — CONSULTS
CONSULTING SERVICE: Otolaryngology-Head and Neck Surgery    Informant:   The history was obtained from chart review and the patient. Limited information from patient as he reports difficulty with memory and demonstrated confusion on today's assessment. Chief Complaint:   His chief complaint is mastoid effusion    History of Present Illness:   Sandeep Zamora is a 60 y.o. male seen consultation at the request of Dr. Vimla Ramsey on 1/1/2023. Complex medical history including esophageal CA, esophageal varices, currently admitted with PNA, gallstones, ascites s/p paracentesis. Had a fall in hospital and underwent CT at 0200 and subsequent MRI at 1400. Scant effusion in mastoid noted so ENT consulted to assess impact of this on recent events/fall and whether additional treatment indicated. Currently on broad spectrum ABX for leukocytosis in paracentesis. No history of middle ear disease. No ear pain noted. No otorrhea. No ear trauma. Does have long standing hearing loss.      Other Pertinent ENT-specific HPI:  None    Pertinent Social/Birth/Family/Medical/Surgical History   Past Medical History:   Past Medical History:   Diagnosis Date    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 ([address]     Alcoholic cirrhosis of liver with ascites ([address]     Anemia 04/13/2022    Anxiety     Arthritis     Back pain, chronic     dr. Florencio Jarrett, orthopedic, every 3-4 months, gets steroid injection    Lezama esophagus     BPH (benign prostatic hypertrophy)     Cholelithiasis     Cirrhosis ([address]     COVID-19 12/2020    pt reports he had a positive test while at [facility] in 2020, was asymptomatic    COVID-19 vaccine series completed 5/20/2021, 6/22/2021    Moderna 5/20/2021, 6/22/2021    DDD (degenerative disc disease), lumbar     Depression     Esophageal cancer ([address]     INVASIVE ADENOCARCINOMA ARISING IN TUBULAR ADENOMA WITH HIGH GRADE DYSPLASIA, ASSOCIATED WITH FOCAL INTESTINAL METAPLASIA     Esophageal varices ([address]     Fatty liver     GERD (gastroesophageal reflux disease)     GI bleed     Hep C w/o coma, chronic (HCC)     History of alcohol abuse     6-12 beers a day; quit drinking 2019    History of blood transfusion     History of colon polyps 2016    History of tobacco abuse     [place] (hard of hearing)     Hyperlipidemia     Hypertension     Hyponatremia 07/20/2016    Hypotension 12/20/2021    Mastoid disorder, bilateral 12/31/2022    biLateral mastoid effusion    PONV (postoperative nausea and vomiting)     Port-A-Cath in place     right upper chest    Portal hypertension (HCC)     Sciatica     Secondary esophageal varices ([address] 06/07/2022    Shortness of breath     Spinal stenosis     Stomach ulcer     hx of    Thrombocytopenia ([address] 12/23/2020    Tubular adenoma of colon 2016, 2018    Vitamin D deficiency     Wears glasses      Past Surgical History:   Past Surgical History:   Procedure Laterality Date    BUNIONECTOMY      twice on right side    BUNIONECTOMY Left     CARPAL TUNNEL RELEASE Right     COLONOSCOPY      at age 35    COLONOSCOPY  10/05/2016    polyps-pathology tubular adenoma, and abnormal looking mucosa right colon-pathology-tubular adenoma    COLONOSCOPY N/A 03/30/2018    COLONOSCOPY POLYPECTOMY COLD BIOPSY performed by Navdeep Moreno MD at [address] [place] [address]  03/30/2018    Small polyp in the sigmoid colon and excised with biopsy forceps--tubular adenoma    COLONOSCOPY N/A 04/16/2022    COLONOSCOPY POLYPECTOMY performed by Navdeep Moreno MD at [address] [place] [address], COLON, DIAGNOSTIC      EGD    ESOPHAGOGASTRODUODENOSCOPY  12/29/2022    IR PORT PLACEMENT EQUAL OR GREATER THAN 5 YEARS  04/19/2021    IR PORT PLACEMENT EQUAL OR GREATER THAN 5 YEARS 4/19/2021 [facility]    IR TIPS INSERTION  12/01/2022    IR TIPS INSERTION 12/1/2022 Maribell Jeong MD [facility]    KNEE SURGERY Left     cyst removed    NASAL SEPTUM SURGERY      NERVE BLOCK Right 11/23/2020    NERVE BLOCK RIGHT CERVICAL STEROID INJECTION  C3-C6 performed by Peyman Zapata MD at [facility]  01/04/2016    steroid injection C7 T1    OTHER SURGICAL HISTORY  11/21/2016    Bilateral Lumbar CACHORRO L4-L5 injections    OTHER SURGICAL HISTORY  12/19/2016    lumbar steroid injection    OTHER SURGICAL HISTORY  09/28/2018    BILATERAL L5 CACHORRO (N/A Back)    OTHER SURGICAL HISTORY Right 11/23/2020    cervical injection    PAIN MANAGEMENT PROCEDURE Left 07/09/2020    EPIDURAL STEROID INJECTION LEFT L4 L5 performed by Peyman Zapata MD at [address] [place] [address] Left 07/20/2020    LEFT L4 L5 EPIDURAL STEROID INJECTION performed by Peyman Zapata MD at [address] [place] [address] Bilateral 08/17/2020    LUMBAR FACET BILATERAL L2-L5 performed by Peyman Zapata MD at [address] [place] [address] Bilateral 12/07/2020    NERVE BLOCK BILATERAL LUMBAR MEDIAL BRANCH L2-L5 performed by Peyman Zapata MD at [address] [place] [address] [facility]/STRD TFRML EPI LUMBAR/SACRAL 1 LEVEL Bilateral 09/06/2018    BILATERAL L5 CACHORRO performed by Peyman Zapata MD at [facility] AA&/STRD TFRML EPI LUMBAR/SACRAL 1 LEVEL N/A 09/28/2018    BILATERAL L5 CACHORRO performed by Peyman Zapata MD at [address] [place] [address] N/CARPAL TUNNEL SURG Right 08/29/2017    CARPAL TUNNEL RELEASE RIGHT performed by Erica Campo MD at [address] [place] [address] N/CARPAL TUNNEL SURG Left 10/31/2017    CARPAL TUNNEL RELEASE performed by Erica Campo MD at [place] [address] 12/29/2020    EGD BIOPSY performed by Ana Ding MD at [address] [place] [address] 02/02/2021    EGD BIOPSY and spot marking performed by Rahul Owen MD at [address] [place] [address] 02/12/2021    ENDOSCOPIC ULTRASOUND, EGD performed by Alan Dinero MD at [address] [place] [address]  02/12/2021    EGD DIAGNOSTIC ONLY performed by Silvia Del Valle Diego Ramirez MD at [facility]    UPPER GASTROINTESTINAL ENDOSCOPY N/A 08/31/2021    EGD BIOPSY performed by Deborah Ingram MD at [address] [place] [address] 01/21/2022    EGD BIOPSY performed by Deborah Ingram MD at [address] [place] [address] N/A 04/15/2022    EGD ESOPHAGOGASTRODUODENOSCOPY performed by Bridget Bryant MD at [address] [place] [address] 06/06/2022    EGD BAND LIGATION performed by Gaol Ha MD at [address] [place] [address] N/A 06/09/2022    EGD ESOPHAGOGASTRODUODENOSCOPY performed by Galo Ha MD at [address] [place] [address] N/A 09/14/2022    EGD ESOPHAGOGASTRODUODENOSCOPY ENDOSCOPIC APC AT GE JUNCTION performed by Deysi Nieves MD at [address] [place] [address] 10/19/2022    EGD BIOPSY performed by Deborah Ingram MD at [address] [place] [address] 10/31/2022    EGD with APC performed by Deborah Ingram MD at [address] [place] [address]  12/29/2022    EGD ESOPHAGOGASTRODUODENOSCOPY performed by Rickie Koo MD at [address] [place] [address]       Medications:   Current Facility-Administered Medications   Medication Dose Route Frequency Provider Last Rate Last Admin    potassium chloride 10 mEq/100 mL IVPB (Peripheral Line)  10 mEq IntraVENous Q2H Salina Morales  mL/hr at 01/01/23 1008 10 mEq at 01/01/23 1008    magnesium sulfate 2000 mg in 50 mL IVPB premix  2,000 mg IntraVENous PRN Salina Morales MD   Stopped at 12/31/22 1216    lactulose (CHRONULAC) 10 GM/15ML solution 20 g  20 g Oral 6 times per day MASSIMO Benson - CNP   20 g at 01/01/23 0757    ipratropium-albuterol (DUONEB) nebulizer solution 1 ampule  1 ampule Inhalation 4x daily Salina Morales MD   1 ampule at 01/01/23 0737    sodium chloride flush 0.9 % injection 5-40 mL  5-40 mL IntraVENous 2 times per day Kathie Muhammad MD   10 mL at 12/31/22 0901    sodium chloride flush 0.9 % injection 5-40 mL  5-40 mL IntraVENous PRN Kathie Muhammad MD        0.9 % sodium chloride infusion   IntraVENous PRN Kathie Muhammad MD        hydrALAZINE (APRESOLINE) injection 10 mg  10 mg IntraVENous Q15 Min PRN Kathie Muhammad MD        OLANZapine [facility]) 2.5 mg in sterile water 0.5 mL injection  2.5 mg IntraMUSCular BID PRN Saulo Spencer MD   2.5 mg at 12/30/22 0141    sodium chloride flush 0.9 % injection 5-40 mL  5-40 mL IntraVENous 2 times per day Saulo Spencer MD   10 mL at 12/31/22 0900    sodium chloride flush 0.9 % injection 5-40 mL  5-40 mL IntraVENous PRN Saulo Spencer MD        0.9 % sodium chloride infusion   IntraVENous PRN Saulo Spencer MD        thiamine tablet 100 mg  100 mg Oral Daily Saulo Spencer MD   100 mg at 01/01/23 0757    LORazepam (ATIVAN) tablet 1 mg  1 mg Oral Q1H PRN Saulo Spencer MD        Or    LORazepam (ATIVAN) injection 1 mg  1 mg IntraVENous Q1H PRN Saulo Spencer MD        Or    LORazepam (ATIVAN) tablet 2 mg  2 mg Oral Q1H PRN Saulo Spencer MD        Or    LORazepam (ATIVAN) injection 2 mg  2 mg IntraVENous Q1H PRN Saulo Spencer MD        Or    LORazepam (ATIVAN) tablet 3 mg  3 mg Oral Q1H PRN Saulo Spencer MD        Or    LORazepam (ATIVAN) injection 3 mg  3 mg IntraVENous Q1H PRN Saulo Spencer MD        Or    LORazepam (ATIVAN) tablet 4 mg  4 mg Oral Q1H PRN Saulo Spencer MD        Or    LORazepam (ATIVAN) injection 4 mg  4 mg IntraVENous Q1H PRN Saulo Spencer MD   4 mg at 12/31/22 0325    baclofen (LIORESAL) tablet 10 mg  10 mg Oral TID Ishan Real MD   10 mg at 01/01/23 0757    sodium chloride flush 0.9 % injection 5-40 mL  5-40 mL IntraVENous 2 times per day Robel Woodward MD   10 mL at 12/31/22 2032    sodium chloride flush 0.9 % injection 5-40 mL  5-40 mL IntraVENous PRN Robel Woodward MD 0.9 % sodium chloride infusion   IntraVENous PRN Jose Ortiz MD        0.9 % sodium chloride infusion   IntraVENous PRN Bernardino Prado MD        cefepime (MAXIPIME) 2,000 mg in sterile water 20 mL IV syringe  2,000 mg IntraVENous Q12H Isa Allen MD   2,000 mg at 01/01/23 1748    LORazepam (ATIVAN) injection 1 mg  1 mg IntraVENous Q6H PRN Isa Allen MD   1 mg at 12/31/22 1357    vancomycin (VANCOCIN) intermittent dosing (placeholder)   Other Tony Mills MD        vancomycin (VANCOCIN) 1750 mg in sodium chloride 0.9 % 500 mL IVPB  1,750 mg IntraVENous Once Isa Allen MD        Followed by    vancomycin (VANCOCIN) 1250 mg in sodium chloride 0.9% 250 mL IVPB  1,250 mg IntraVENous Q12H Isa Allen MD   Stopped at 01/01/23 0644    potassium chloride 10 mEq/100 mL IVPB (Peripheral Line)  10 mEq IntraVENous PRN Rama Basilio MD        diphenhydrAMINE (BENADRYL) injection 25 mg  25 mg IntraVENous Q6H PRN Rama Basilio MD   25 mg at 12/29/22 0938    0.9 % sodium chloride infusion   IntraVENous PRN Rama Basilio MD        0.9 % sodium chloride infusion   IntraVENous Continuous MASSIMO Amaya - CNS   Stopped at 01/01/23 0504    sodium chloride flush 0.9 % injection 5-40 mL  5-40 mL IntraVENous 2 times per day Rama Basilio MD   10 mL at 12/31/22 0901    sodium chloride flush 0.9 % injection 5-40 mL  5-40 mL IntraVENous PRN Rama Basilio MD        ondansetron (ZOFRAN-ODT) disintegrating tablet 4 mg  4 mg Oral Q8H PRN Rama Basilio MD   4 mg at 12/29/22 0016    Or    ondansetron (ZOFRAN) injection 4 mg  4 mg IntraVENous Q6H PRN Rama Basilio MD        octreotide (SANDOSTATIN) 500 mcg in sodium chloride 0.9 % 100 mL infusion  50 mcg/hr IntraVENous Continuous Zelda MASSIMO Izaguirre - CNP 10 mL/hr at 12/31/22 1655 50 mcg/hr at 12/31/22 1655        Examination:   Vital Signs   Vitals:    01/01/23 0422 01/01/23 0510 01/01/23 2042 01/01/23 1644 BP: 100/64   108/73   Pulse: 80  98 (!) 102   Resp: 10  21 23   Temp: 98.3 °F (36.8 °C)   97.9 °F (36.6 °C)   TempSrc: Axillary   Axillary   SpO2: 98%  96% (!) 89%   Weight:  190 lb 10.6 oz (86.5 kg)     Height:           Constitutional   General Appearance: well developed and well nourished and in no acute distress  Speech: age appropriate  Head & Face   Head:   normocephalic and symmetric  Eyes: no eyelid swelling, no conjunctival injection or exudate, pupils equal round and reactive to light  Ears   Right EXT: normal  Right EAC: patent  Right TM: normal landmarks and mobility    Left EXT: normal  Left EAC: patent  Left TM: normal landmarks and mobility    Hearing: is responsive to whispered voice. Nose   Dorsum: dorsum midline  Nasal mucosa: no edema. Rhinorrhea: mild crusted drainage. Nasal cannulat in place  Septum: midline. No perforation  Turbinates: no inferior turbinate hypertrophy  Nasopharynx Unable to perform indirect mirror laryngoscopy due to patient age and intolerance of exam    Oral Cavity, Oropharynx   Lips: normal  Dentition: poor dental health  Oral mucosa: several palatal petechiae  Gums: normal  Palate: intact, mobile  Pharynx: intact mobile  Posterior pharyngeal wall: normal  Tongue: intact, full range of motion; floor of mouth: no lesions  Tonsil size: normal  Neck   Trachea: midline  Thyroid: normal  Salivary glands: no parotid or submandibular masses or tenderness noted. Lymphatic Nodes: no palpable adenopathy  Respiratory   Auscultation: not examined  Effort: no retractions noted  Voice: clear  Chest movement: symmetrical  Cardiac   Auscultation: not examined   Neuro/ Psych   Cranial Nerves: II-XII intact    Additional data reviewed:    Radiology: yes, MRI and CT reviewed as noted above.   Scant effusion in mastoids, L>R without evidence of middle ear effusions  Local medical record review:  IM notes reviewed  Discussion of patient care/tests with other healthcare professionals: yes, Discussed with primary team    Procedures:    None    Surgical risk factors:  significant comorbodies - multiple comorbidities that would make surgical intervention and anesthesia exposure elevated risk as compared to healthy individual    -----------------------------------------------------------------  Visit Diagnosis/Assessment:   Davida Marquez is a 60 y.o. male with incidental mastoid effusion noted on imaging without evidence of middle ear disease    Plan:  Likely incidental finding and not related to underlying issues. May be related to mild mucosal edema from recent URI or recent anesthesia exposure. No intervention recommended. ENT will sign off. Please call with questions or concerns.    --------------------------------------------------  Jennifer Ch MD  Pediatric Otolaryngology-Head and Neck Surgery  [address] [facility]    Office  ph# [phone]    Also available in [address] [place] [address]

## 2023-01-02 ENCOUNTER — APPOINTMENT (OUTPATIENT)
Dept: GENERAL RADIOLOGY | Age: 63
End: 2023-01-02
Attending: INTERNAL MEDICINE
Payer: MEDICARE

## 2023-01-02 ENCOUNTER — APPOINTMENT (OUTPATIENT)
Dept: NUCLEAR MEDICINE | Age: 63
End: 2023-01-02
Attending: INTERNAL MEDICINE
Payer: MEDICARE

## 2023-01-02 LAB
ABSOLUTE EOS #: 0.07 K/UL (ref 0–0.44)
ABSOLUTE IMMATURE GRANULOCYTE: <0.03 K/UL (ref 0–0.3)
ABSOLUTE LYMPH #: 0.16 K/UL (ref 1.1–3.7)
ABSOLUTE MONO #: 0.64 K/UL (ref 0.1–1.2)
ALBUMIN SERPL-MCNC: 2.3 G/DL (ref 3.5–5.2)
ALBUMIN/GLOBULIN RATIO: 1 (ref 1–2.5)
ALP BLD-CCNC: 121 U/L (ref 40–129)
ALT SERPL-CCNC: 35 U/L (ref 5–41)
AMMONIA: 38 UMOL/L (ref 16–60)
ANION GAP SERPL CALCULATED.3IONS-SCNC: 8 MMOL/L (ref 9–17)
AST SERPL-CCNC: 135 U/L
BASOPHILS # BLD: 0 % (ref 0–2)
BASOPHILS ABSOLUTE: <0.03 K/UL (ref 0–0.2)
BILIRUB SERPL-MCNC: 5.4 MG/DL (ref 0.3–1.2)
BUN BLDV-MCNC: 4 MG/DL (ref 8–23)
C-REACTIVE PROTEIN: 31.8 MG/L (ref 0–5)
CALCIUM SERPL-MCNC: 7.3 MG/DL (ref 8.6–10.4)
CHLORIDE BLD-SCNC: 113 MMOL/L (ref 98–107)
CO2: 18 MMOL/L (ref 20–31)
CREAT SERPL-MCNC: 0.4 MG/DL (ref 0.7–1.2)
D-DIMER QUANTITATIVE: 1.79 MG/L FEU
EKG ATRIAL RATE: 115 BPM
EKG ATRIAL RATE: 127 BPM
EKG P AXIS: 14 DEGREES
EKG P AXIS: 57 DEGREES
EKG P-R INTERVAL: 146 MS
EKG P-R INTERVAL: 158 MS
EKG Q-T INTERVAL: 328 MS
EKG Q-T INTERVAL: 344 MS
EKG QRS DURATION: 76 MS
EKG QRS DURATION: 80 MS
EKG QTC CALCULATION (BAZETT): 475 MS
EKG QTC CALCULATION (BAZETT): 476 MS
EKG R AXIS: 61 DEGREES
EKG R AXIS: 66 DEGREES
EKG T AXIS: -13 DEGREES
EKG T AXIS: 3 DEGREES
EKG VENTRICULAR RATE: 115 BPM
EKG VENTRICULAR RATE: 127 BPM
EOSINOPHILS RELATIVE PERCENT: 2 % (ref 1–4)
ESTIMATED AVERAGE GLUCOSE: 82 MG/DL
GFR SERPL CREATININE-BSD FRML MDRD: >60 ML/MIN/1.73M2
GLUCOSE BLD-MCNC: 130 MG/DL (ref 75–110)
GLUCOSE BLD-MCNC: 134 MG/DL (ref 75–110)
GLUCOSE BLD-MCNC: 137 MG/DL (ref 75–110)
GLUCOSE BLD-MCNC: 141 MG/DL (ref 70–99)
GLUCOSE BLD-MCNC: 164 MG/DL (ref 75–110)
HBA1C MFR BLD: 4.5 % (ref 4–6)
HCT VFR BLD CALC: 24.1 % (ref 40.7–50.3)
HCT VFR BLD CALC: 26.6 % (ref 40.7–50.3)
HCT VFR BLD CALC: 29.9 % (ref 40.7–50.3)
HEMOGLOBIN: 7.3 G/DL (ref 13–17)
HEMOGLOBIN: 7.5 G/DL (ref 13–17)
HEMOGLOBIN: 8.5 G/DL (ref 13–17)
IMMATURE GRANULOCYTES: 1 %
INR BLD: 1.4
LACTIC ACID, WHOLE BLOOD: 2.5 MMOL/L (ref 0.7–2.1)
LACTIC ACID, WHOLE BLOOD: 3.1 MMOL/L (ref 0.7–2.1)
LACTIC ACID, WHOLE BLOOD: 3.2 MMOL/L (ref 0.7–2.1)
LACTIC ACID, WHOLE BLOOD: 3.4 MMOL/L (ref 0.7–2.1)
LV EF: 60 %
LVEF MODALITY: NORMAL
LYMPHOCYTES # BLD: 4 % (ref 24–43)
MAGNESIUM: 1.4 MG/DL (ref 1.6–2.6)
MAGNESIUM: 1.7 MG/DL (ref 1.6–2.6)
MCH RBC QN AUTO: 25.4 PG (ref 25.2–33.5)
MCHC RBC AUTO-ENTMCNC: 28.4 G/DL (ref 28.4–34.8)
MCV RBC AUTO: 89.3 FL (ref 82.6–102.9)
MONOCYTES # BLD: 15 % (ref 3–12)
NRBC AUTOMATED: 0 PER 100 WBC
PARTIAL THROMBOPLASTIN TIME: 35.9 SEC (ref 20.5–30.5)
PDW BLD-RTO: 20.5 % (ref 11.8–14.4)
PHOSPHORUS: 1.4 MG/DL (ref 2.5–4.5)
PHOSPHORUS: 1.5 MG/DL (ref 2.5–4.5)
PLATELET # BLD: 70 K/UL (ref 138–453)
PMV BLD AUTO: 9.7 FL (ref 8.1–13.5)
POTASSIUM SERPL-SCNC: 3.2 MMOL/L (ref 3.7–5.3)
PRO-BNP: 563 PG/ML
PROCALCITONIN: 0.15 NG/ML
PROTHROMBIN TIME: 14.7 SEC (ref 9.1–12.3)
RBC # BLD: 3.35 M/UL (ref 4.21–5.77)
RBC # BLD: ABNORMAL 10*6/UL
REASON FOR REJECTION: NORMAL
SEG NEUTROPHILS: 79 % (ref 36–65)
SEGMENTED NEUTROPHILS ABSOLUTE COUNT: 3.27 K/UL (ref 1.5–8.1)
SODIUM BLD-SCNC: 139 MMOL/L (ref 135–144)
TOTAL PROTEIN: 4.6 G/DL (ref 6.4–8.3)
WBC # BLD: 4.2 K/UL (ref 3.5–11.3)
ZZ NTE CLEAN UP: ORDERED TEST: NORMAL
ZZ NTE WITH NAME CLEAN UP: SPECIMEN SOURCE: NORMAL

## 2023-01-02 PROCEDURE — 84145 PROCALCITONIN (PCT): CPT

## 2023-01-02 PROCEDURE — 82140 ASSAY OF AMMONIA: CPT

## 2023-01-02 PROCEDURE — 85014 HEMATOCRIT: CPT

## 2023-01-02 PROCEDURE — 2580000003 HC RX 258: Performed by: NURSE PRACTITIONER

## 2023-01-02 PROCEDURE — 2060000000 HC ICU INTERMEDIATE R&B

## 2023-01-02 PROCEDURE — 2580000003 HC RX 258: Performed by: INTERNAL MEDICINE

## 2023-01-02 PROCEDURE — 94761 N-INVAS EAR/PLS OXIMETRY MLT: CPT

## 2023-01-02 PROCEDURE — 99232 SBSQ HOSP IP/OBS MODERATE 35: CPT | Performed by: INTERNAL MEDICINE

## 2023-01-02 PROCEDURE — 2700000000 HC OXYGEN THERAPY PER DAY

## 2023-01-02 PROCEDURE — 85379 FIBRIN DEGRADATION QUANT: CPT

## 2023-01-02 PROCEDURE — 97162 PT EVAL MOD COMPLEX 30 MIN: CPT

## 2023-01-02 PROCEDURE — P9047 ALBUMIN (HUMAN), 25%, 50ML: HCPCS | Performed by: INTERNAL MEDICINE

## 2023-01-02 PROCEDURE — 6360000002 HC RX W HCPCS: Performed by: NURSE PRACTITIONER

## 2023-01-02 PROCEDURE — 36415 COLL VENOUS BLD VENIPUNCTURE: CPT

## 2023-01-02 PROCEDURE — 85025 COMPLETE CBC W/AUTO DIFF WBC: CPT

## 2023-01-02 PROCEDURE — 85018 HEMOGLOBIN: CPT

## 2023-01-02 PROCEDURE — 94640 AIRWAY INHALATION TREATMENT: CPT

## 2023-01-02 PROCEDURE — 85610 PROTHROMBIN TIME: CPT

## 2023-01-02 PROCEDURE — 93306 TTE W/DOPPLER COMPLETE: CPT

## 2023-01-02 PROCEDURE — 97535 SELF CARE MNGMENT TRAINING: CPT

## 2023-01-02 PROCEDURE — 80053 COMPREHEN METABOLIC PANEL: CPT

## 2023-01-02 PROCEDURE — 93010 ELECTROCARDIOGRAM REPORT: CPT | Performed by: INTERNAL MEDICINE

## 2023-01-02 PROCEDURE — 2580000003 HC RX 258: Performed by: ANESTHESIOLOGY

## 2023-01-02 PROCEDURE — 97530 THERAPEUTIC ACTIVITIES: CPT

## 2023-01-02 PROCEDURE — 2500000003 HC RX 250 WO HCPCS: Performed by: INTERNAL MEDICINE

## 2023-01-02 PROCEDURE — 6370000000 HC RX 637 (ALT 250 FOR IP): Performed by: NURSE PRACTITIONER

## 2023-01-02 PROCEDURE — 82947 ASSAY GLUCOSE BLOOD QUANT: CPT

## 2023-01-02 PROCEDURE — 71045 X-RAY EXAM CHEST 1 VIEW: CPT

## 2023-01-02 PROCEDURE — 6370000000 HC RX 637 (ALT 250 FOR IP): Performed by: INTERNAL MEDICINE

## 2023-01-02 PROCEDURE — 83605 ASSAY OF LACTIC ACID: CPT

## 2023-01-02 PROCEDURE — 84100 ASSAY OF PHOSPHORUS: CPT

## 2023-01-02 PROCEDURE — 85730 THROMBOPLASTIN TIME PARTIAL: CPT

## 2023-01-02 PROCEDURE — 83735 ASSAY OF MAGNESIUM: CPT

## 2023-01-02 PROCEDURE — APPSS30 APP SPLIT SHARED TIME 16-30 MINUTES: Performed by: NURSE PRACTITIONER

## 2023-01-02 PROCEDURE — 86140 C-REACTIVE PROTEIN: CPT

## 2023-01-02 PROCEDURE — 97166 OT EVAL MOD COMPLEX 45 MIN: CPT

## 2023-01-02 PROCEDURE — 83880 ASSAY OF NATRIURETIC PEPTIDE: CPT

## 2023-01-02 PROCEDURE — 6360000002 HC RX W HCPCS: Performed by: INTERNAL MEDICINE

## 2023-01-02 RX ORDER — 0.9 % SODIUM CHLORIDE 0.9 %
500 INTRAVENOUS SOLUTION INTRAVENOUS ONCE
Status: DISCONTINUED | OUTPATIENT
Start: 2023-01-02 | End: 2023-01-03

## 2023-01-02 RX ORDER — POTASSIUM CHLORIDE 7.45 MG/ML
10 INJECTION INTRAVENOUS PRN
Status: DISCONTINUED | OUTPATIENT
Start: 2023-01-02 | End: 2023-01-02 | Stop reason: SDUPTHER

## 2023-01-02 RX ORDER — MAGNESIUM SULFATE IN WATER 40 MG/ML
2000 INJECTION, SOLUTION INTRAVENOUS PRN
Status: DISCONTINUED | OUTPATIENT
Start: 2023-01-02 | End: 2023-01-02 | Stop reason: SDUPTHER

## 2023-01-02 RX ORDER — POTASSIUM CHLORIDE 29.8 MG/ML
20 INJECTION INTRAVENOUS PRN
Status: DISCONTINUED | OUTPATIENT
Start: 2023-01-02 | End: 2023-01-02 | Stop reason: SDUPTHER

## 2023-01-02 RX ORDER — METOPROLOL TARTRATE 5 MG/5ML
2.5 INJECTION INTRAVENOUS EVERY 6 HOURS PRN
Status: DISCONTINUED | OUTPATIENT
Start: 2023-01-02 | End: 2023-01-03

## 2023-01-02 RX ORDER — SODIUM CHLORIDE 9 MG/ML
INJECTION, SOLUTION INTRAVENOUS CONTINUOUS
Status: DISCONTINUED | OUTPATIENT
Start: 2023-01-02 | End: 2023-01-03

## 2023-01-02 RX ORDER — 0.9 % SODIUM CHLORIDE 0.9 %
500 INTRAVENOUS SOLUTION INTRAVENOUS ONCE
Status: COMPLETED | OUTPATIENT
Start: 2023-01-02 | End: 2023-01-02

## 2023-01-02 RX ORDER — ALBUMIN (HUMAN) 12.5 G/50ML
50 SOLUTION INTRAVENOUS ONCE
Status: COMPLETED | OUTPATIENT
Start: 2023-01-02 | End: 2023-01-02

## 2023-01-02 RX ORDER — POTASSIUM CHLORIDE 20 MEQ/1
40 TABLET, EXTENDED RELEASE ORAL PRN
Status: DISCONTINUED | OUTPATIENT
Start: 2023-01-02 | End: 2023-01-11 | Stop reason: HOSPADM

## 2023-01-02 RX ORDER — MAGNESIUM SULFATE IN WATER 40 MG/ML
2000 INJECTION, SOLUTION INTRAVENOUS PRN
Status: DISCONTINUED | OUTPATIENT
Start: 2023-01-02 | End: 2023-01-11 | Stop reason: HOSPADM

## 2023-01-02 RX ORDER — POTASSIUM CHLORIDE 7.45 MG/ML
10 INJECTION INTRAVENOUS PRN
Status: DISCONTINUED | OUTPATIENT
Start: 2023-01-02 | End: 2023-01-11 | Stop reason: HOSPADM

## 2023-01-02 RX ORDER — FUROSEMIDE 10 MG/ML
20 INJECTION INTRAMUSCULAR; INTRAVENOUS ONCE
Status: COMPLETED | OUTPATIENT
Start: 2023-01-02 | End: 2023-01-02

## 2023-01-02 RX ORDER — 0.9 % SODIUM CHLORIDE 0.9 %
500 INTRAVENOUS SOLUTION INTRAVENOUS ONCE
Status: DISCONTINUED | OUTPATIENT
Start: 2023-01-02 | End: 2023-01-02 | Stop reason: SDUPTHER

## 2023-01-02 RX ADMIN — FUROSEMIDE 20 MG: 10 INJECTION, SOLUTION INTRAMUSCULAR; INTRAVENOUS at 11:34

## 2023-01-02 RX ADMIN — IPRATROPIUM BROMIDE AND ALBUTEROL SULFATE 1 AMPULE: .5; 3 SOLUTION RESPIRATORY (INHALATION) at 12:00

## 2023-01-02 RX ADMIN — POTASSIUM CHLORIDE 10 MEQ: 7.46 INJECTION, SOLUTION INTRAVENOUS at 16:22

## 2023-01-02 RX ADMIN — CEFEPIME 2000 MG: 2 INJECTION, POWDER, FOR SOLUTION INTRAVENOUS at 23:20

## 2023-01-02 RX ADMIN — LACTULOSE 20 G: 20 SOLUTION ORAL at 12:36

## 2023-01-02 RX ADMIN — Medication 1250 MG: at 17:28

## 2023-01-02 RX ADMIN — METRONIDAZOLE 500 MG: 500 INJECTION, SOLUTION INTRAVENOUS at 16:19

## 2023-01-02 RX ADMIN — Medication 100 MG: at 07:51

## 2023-01-02 RX ADMIN — POTASSIUM CHLORIDE 10 MEQ: 7.46 INJECTION, SOLUTION INTRAVENOUS at 14:24

## 2023-01-02 RX ADMIN — BACLOFEN 10 MG: 10 TABLET ORAL at 07:51

## 2023-01-02 RX ADMIN — POTASSIUM CHLORIDE 10 MEQ: 1500 TABLET, EXTENDED RELEASE ORAL at 20:08

## 2023-01-02 RX ADMIN — CEFEPIME 2000 MG: 2 INJECTION, POWDER, FOR SOLUTION INTRAVENOUS at 06:08

## 2023-01-02 RX ADMIN — IPRATROPIUM BROMIDE AND ALBUTEROL SULFATE 1 AMPULE: .5; 3 SOLUTION RESPIRATORY (INHALATION) at 15:58

## 2023-01-02 RX ADMIN — LACTULOSE 20 G: 20 SOLUTION ORAL at 16:10

## 2023-01-02 RX ADMIN — SODIUM CHLORIDE: 9 INJECTION, SOLUTION INTRAVENOUS at 14:14

## 2023-01-02 RX ADMIN — LACTULOSE 20 G: 20 SOLUTION ORAL at 20:08

## 2023-01-02 RX ADMIN — SODIUM CHLORIDE, PRESERVATIVE FREE 10 ML: 5 INJECTION INTRAVENOUS at 20:09

## 2023-01-02 RX ADMIN — POTASSIUM CHLORIDE 10 MEQ: 1500 TABLET, EXTENDED RELEASE ORAL at 07:51

## 2023-01-02 RX ADMIN — CEFEPIME 2000 MG: 2 INJECTION, POWDER, FOR SOLUTION INTRAVENOUS at 16:09

## 2023-01-02 RX ADMIN — OCTREOTIDE ACETATE 50 MCG/HR: 500 INJECTION, SOLUTION INTRAVENOUS; SUBCUTANEOUS at 04:36

## 2023-01-02 RX ADMIN — SODIUM PHOSPHATE, MONOBASIC, MONOHYDRATE AND SODIUM PHOSPHATE, DIBASIC, ANHYDROUS 15 MMOL: 142; 276 INJECTION, SOLUTION INTRAVENOUS at 14:23

## 2023-01-02 RX ADMIN — POTASSIUM CHLORIDE 10 MEQ: 7.46 INJECTION, SOLUTION INTRAVENOUS at 15:31

## 2023-01-02 RX ADMIN — LORAZEPAM 1 MG: 2 INJECTION INTRAMUSCULAR at 03:53

## 2023-01-02 RX ADMIN — IPRATROPIUM BROMIDE AND ALBUTEROL SULFATE 1 AMPULE: .5; 3 SOLUTION RESPIRATORY (INHALATION) at 19:43

## 2023-01-02 RX ADMIN — METRONIDAZOLE 500 MG: 500 INJECTION, SOLUTION INTRAVENOUS at 08:01

## 2023-01-02 RX ADMIN — METOPROLOL TARTRATE 2.5 MG: 5 INJECTION, SOLUTION INTRAVENOUS at 13:04

## 2023-01-02 RX ADMIN — ALBUMIN (HUMAN) 50 G: 0.25 INJECTION, SOLUTION INTRAVENOUS at 11:00

## 2023-01-02 RX ADMIN — POTASSIUM CHLORIDE 10 MEQ: 7.46 INJECTION, SOLUTION INTRAVENOUS at 17:30

## 2023-01-02 RX ADMIN — LACTULOSE 20 G: 20 SOLUTION ORAL at 03:50

## 2023-01-02 RX ADMIN — Medication 1250 MG: at 04:39

## 2023-01-02 RX ADMIN — LACTULOSE 20 G: 20 SOLUTION ORAL at 00:05

## 2023-01-02 RX ADMIN — METRONIDAZOLE 500 MG: 500 INJECTION, SOLUTION INTRAVENOUS at 23:25

## 2023-01-02 RX ADMIN — MAGNESIUM SULFATE HEPTAHYDRATE 2000 MG: 40 INJECTION, SOLUTION INTRAVENOUS at 17:00

## 2023-01-02 RX ADMIN — SODIUM CHLORIDE: 9 INJECTION, SOLUTION INTRAVENOUS at 23:20

## 2023-01-02 RX ADMIN — SODIUM CHLORIDE 500 ML: 9 INJECTION, SOLUTION INTRAVENOUS at 12:03

## 2023-01-02 RX ADMIN — IPRATROPIUM BROMIDE AND ALBUTEROL SULFATE 1 AMPULE: .5; 3 SOLUTION RESPIRATORY (INHALATION) at 08:57

## 2023-01-02 NOTE — PROGRESS NOTES
Occupational Therapy  [facility] [address] [facility]  Occupational Therapy Initial Assessment    Name: Chata Mares  : 1960  MRN: [id number]  Date of Service: 2023    Copied from chart:   \"60year-old gentleman with known history of chronic alcohol abuse admitted with acute GI bleed with Lezama's esophagus/esophageal cancer with upper endoscopy revealing moderately large varices with moderate portal hypertensive gastropathy. Status post therapeutic paracentesis 4.5 L with positive leukocytosis on IV Zosyn for SBP prophylaxis. 3 units blood transfusion. Electrolytes replaced. Full code resuscitation status. \"    Discharge Recommendations:  Patient would benefit from continued therapy after discharge  OT Equipment Recommendations  Equipment Needed: Yes  Equipment recommendations listed below are based on what the patient would need if they were able to return to prior living arrangements at the time of discharge. Mobility Devices: Tonye [place]; ADL Assistive Devices  Walker: Rolling  ADL Assistive Devices: Transfer Tub Bench;Reacher;Long-handled Shoe Horn;Long-handled Sponge;Sock-Aid Hard       Patient Diagnosis(es): There were no encounter diagnoses.   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 ([address] Alcoholic cirrhosis of liver with ascites ([address] Anemia, Anxiety, Arthritis, Back pain, chronic, Lezama esophagus, BPH (benign prostatic hypertrophy), Cholelithiasis, Cirrhosis ([address] COVID-19, COVID-19 vaccine series completed, DDD (degenerative disc disease), lumbar, Depression, Esophageal cancer ([address] Esophageal varices ([address] Fatty liver, GERD (gastroesophageal reflux disease), GI bleed, Hep C w/o coma, chronic ([address] History of alcohol abuse, History of blood transfusion, History of colon polyps, History of tobacco abuse, [place] (hard of hearing), Hyperlipidemia, Hypertension, Hyponatremia, Hypotension, Mastoid disorder, bilateral, PONV (postoperative nausea and vomiting), Port-A-Cath in place, Portal hypertension ([address] Sciatica, Secondary esophageal varices ([address] Shortness of breath, Spinal stenosis, Stomach ulcer, Thrombocytopenia ([address] Tubular adenoma of colon, Vitamin D deficiency, and Wears glasses. Past Surgical History:  has a past surgical history that includes Bunionectomy; Nasal septum surgery; other surgical history (01/04/2016); Colonoscopy; Colonoscopy (10/05/2016); other surgical history (11/21/2016); other surgical history (12/19/2016); knee surgery (Left); Bunionectomy (Left); Endoscopy, colon, diagnostic; pr neuroplasty &/transpos median nrv carpal tunne (Right, 08/29/2017); Carpal tunnel release (Right); pr neuroplasty &/transpos median nrv carpal tunne (Left, 10/31/2017); Colonoscopy (N/A, 03/30/2018); Colonoscopy (03/30/2018); pr njx aa&/strd tfrml epi lumbar/sacral 1 level (Bilateral, 09/06/2018); other surgical history (09/28/2018); pr njx aa&/strd tfrml epi lumbar/sacral 1 level (N/A, 09/28/2018); Pain management procedure (Left, 07/09/2020); Pain management procedure (Left, 07/20/2020); Pain management procedure (Bilateral, 08/17/2020); other surgical history (Right, 11/23/2020); Nerve Block (Right, 11/23/2020); Pain management procedure (Bilateral, 12/07/2020); Upper gastrointestinal endoscopy (N/A, 12/29/2020); Upper gastrointestinal endoscopy (N/A, 02/02/2021); Upper gastrointestinal endoscopy (N/A, 02/12/2021); Upper gastrointestinal endoscopy (02/12/2021); [place] tooth extraction; IR PORT PLACEMENT > 5 YEARS (04/19/2021); Upper gastrointestinal endoscopy (N/A, 08/31/2021); Upper gastrointestinal endoscopy (N/A, 01/21/2022); Upper gastrointestinal endoscopy (N/A, 04/15/2022); Colonoscopy (N/A, 04/16/2022); Upper gastrointestinal endoscopy (N/A, 06/06/2022); Upper gastrointestinal endoscopy (N/A, 06/09/2022); Paracentesis; Upper gastrointestinal endoscopy (N/A, 09/14/2022); Upper gastrointestinal endoscopy (N/A, 10/19/2022);  Upper gastrointestinal endoscopy (N/A, 10/31/2022); IR TIPS INSERTION (12/01/2022); Esophagogastroduodenoscopy (12/29/2022); and Upper gastrointestinal endoscopy (12/29/2022). Assessment   Performance deficits / Impairments: Decreased functional mobility ; Decreased ADL status; Decreased strength;Decreased safe awareness;Decreased cognition;Decreased endurance;Decreased balance;Decreased high-level IADLs;Decreased coordination  Assessment: Pt agreeable to OT eval this date. Pt engaged in bed mobility requiring Min A for progression of trunk and BLE. Pt demonstrates decreased cognition throughout session often requiring repeating cueing and increased time to follow simple commands. Pt engaged in static and dynamic sitting activities at EOB with CGA, however was limited in activity secondary to tachycardia which prevented further functional transfers and mobility this date. Pt exhibits decreased strength and coordination to B hands d/t edema. Pt will require continued OT services to increase functional independence with ADLs/IADLs and functional transfers/mobility.   Prognosis: Good  Decision Making: Medium Complexity  REQUIRES OT FOLLOW-UP: Yes  Activity Tolerance  Activity Tolerance: Treatment limited secondary to decreased cognition;Treatment limited secondary to medical complications (free text)  Activity Tolerance Comments: tachycardia limited session significantly this date        Plan   Occupational Therapy Plan  Times Per Week: 3-5 x/wk  Current Treatment Recommendations: Strengthening, Balance training, Functional mobility training, Endurance training, Cognitive reorientation, Safety education & training, Patient/Caregiver education & training, Equipment evaluation, education, & procurement, Self-Care / ADL, Home management training, Positioning, Coordination training     Restrictions  Restrictions/Precautions  Restrictions/Precautions: Fall Risk, Bedrest with Bathroom Privileges (with assistance and as tolerated.)  Required Braces or Orthoses?: No  Position Activity Restriction  Other position/activity restrictions: s/p TIPS revision 12/30/22    Subjective   General  Patient assessed for rehabilitation services?: Yes  Family / Caregiver Present: No  General Comment  Comments: RN ok'd pt for OT eval this date. Pt agreeable to session and pleasant/cooperative throughout. Pt denies pain. Pt initially lethargic however as session progressed pt became more alert    Social/Functional History  Social/Functional History  Lives With: Alone  Type of Home: House  Home Layout: One level  Home Access: Stairs to enter without rails  Entrance Stairs - Number of Steps: 3  Bathroom Shower/Tub: Tub/Shower unit  Bathroom Toilet: Standard  Home Equipment: Perfect Escapesator, [address] Help From: Family  ADL Assistance: Independent  Homemaking Assistance: Independent  Homemaking Responsibilities: Yes (pt reports family assists prn with household tasks)  Ambulation Assistance: Independent  Transfer Assistance: Independent  Active : Yes  Mode of Transportation: [employer]  Occupation: Retired  Type of Occupation:   Leisure & Hobbies: \"Have fun\"  Additional Comments: Pt reports ex-wife is able to provide prn assist upon discharge. Pt is a questionable historian based on cognitive status this date       Objective   Observation/Palpation  Edema: Significant pitting edema noted of bilateral UE  Safety Devices  Type of Devices: Nurse notified; Left in bed;Call light within reach; Bed alarm in place  Restraints  Restraints Initially in Place: No    Bed Mobility Training  Bed Mobility Training: Yes  Overall Level of Assistance: Minimum assistance; Additional time (pt engaged in bed mobility requiring Min A for trunk and BLE progression with Mod VCs/TCs to initiate maneuver)  Interventions: Safety awareness training; Tactile cues; Verbal cues  Supine to Sit: Minimum assistance; Additional time; Adaptive equipment  Sit to Supine: Minimum assistance; Additional time; Adaptive equipment  Balance  Sitting: With support (CGA required throughout ~10-15 min seated at which time pt's HR elevated to 156 bpm and pt was safely returned to supine. RN present and aware of tachycardia)  Standing:  (JASON d/t tachycardia)  Transfer Training  Transfer Training: No (unsafe to attempt functional transfers secondary to tachycardia)  Gait  Overall Level of Assistance:  (JASON d/t tachycardia)    AROM: Within functional limits  Strength: Generally decreased, functional (B shoulders and bicep/triceps 4/5, B hands 4-/5)  Coordination: Generally decreased, functional ([facility] deficits noted secondary to BUE tremors and B hand edema)  Tone: Normal  Sensation: Intact    ADL  Feeding: Minimal assistance;Setup;Verbal cueing; Increased time to complete;Bringing food to mouth assist;Adaptive utensils (comment); Scoop assist  Feeding Skilled Clinical Factors: pt engaged in feeding task this date requiring Min A to scoop food and bring to mouth secondary to BUE edema and decreased coordination. Pt was later educated on built-up handles and provided with built-up handles for future use demonstrating fair understanding. Grooming: Minimal assistance;Setup;Verbal cueing; Increased time to complete  UE Bathing: Minimal assistance;Setup;Verbal cueing; Increased time to complete  LE Bathing: Setup;Verbal cueing; Increased time to complete; Moderate assistance  UE Dressing: Minimal assistance;Setup;Verbal cueing; Increased time to complete  LE Dressing: Moderate assistance;Setup;Verbal cueing; Increased time to complete  Toileting: Moderate assistance;Setup; Increased time to complete    Vision  Vision: Impaired  Vision Exceptions: Wears glasses at all times  Hearing  Hearing: Within functional limits  Cognition  Overall Cognitive Status: Exceptions  Following Commands:  Follows multistep commands with increased time  Attention Span: Difficulty dividing attention  Safety Judgement: Decreased awareness of need for safety;Decreased awareness of need for assistance  Problem Solving: Assistance required to generate solutions  Insights: Decreased awareness of deficits  Initiation: Requires cues for some  Sequencing: Requires cues for some  Orientation  Overall Orientation Status: Within Functional Limits (orientation waxed and waned throughout session at times pt questioning \"how long will I be here in this program\")    Education Provided Comments: Pt ed on OT role, OT POC, safety awareness, bed mobility, ADL adaptive tech, edema , orientation, and importance of continued OT. Pt verbalized fair understanding however was limited by cognition. LUE AROM (degrees)  LUE AROM : WFL  Left Hand AROM (degrees)  Left Hand AROM: WFL  Left Hand General AROM: mild limitations secondary to edema however remains functional  RUE AROM (degrees)  RUE AROM : WFL  Right Hand AROM (degrees)  Right Hand General AROM: mild limitations secondary to edema however remains functional     Hand Dominance  Hand Dominance: Right    AM-PAC Score  AM-[facility] Inpatient Daily Activity Raw Score: 15 (01/02/23 1637)  AM-PAC Inpatient ADL T-Scale Score : 34.69 (01/02/23 1637)  ADL Inpatient CMS 0-100% Score: 56.46 (01/02/23 1637)  ADL Inpatient CMS G-Code Modifier : CK (01/02/23 1637)    Goals  Short Term Goals  Time Frame for Short Term Goals: By discharge, pt will:  Short Term Goal 1: Demo SUP for bed mobility to increase independence with ADLs and decrease risk for pressure injury  Short Term Goal 2: Demo Mod I for feeding, grooming, and UB ADLs with AE use PRN  Short Term Goal 3: Demo CGA for LB ADLs and toileting tasks with AE use PRN  Short Term Goal 4: Follow 50% of 2-step commands with appropriate initiation and sequencing for improved functional independence  Short Term Goal 5:  Incorporate/verbalize 2 edema management techniques during therapy session with 100% accuracy  Short Term Goal 6: Engage in functional transfers and functional mobility with CGA and use of LRD PRN for engagement in ADLs/IADLs       Therapy Time   Individual Concurrent Group Co-treatment   Time In 1043         Time Out 1117 (+8 min as writer obtained and provided built-up handles to pt)         Minutes 42         Timed Code Treatment Minutes: 23 Minutes       Amari Goncalves, OTR/L

## 2023-01-02 NOTE — PROGRESS NOTES
[facility]. [place]'s   Gastroenterology Progress Note    Xavier Frey is a 60 y.o. male patient. Hospitalization Day:5      Chief consult reason:   Evaluation for GI bleeding, melena    Subjective:  Patient seen and examined. No acute events overnight  Pt still slightly encephalopathic-uncooperative at times with Lactulose  Bili on admission was 2.3 and has fluctuated to 5.4 today  INR 1.4 stable    VITALS:  BP (!) 131/59   Pulse (!) 134   Temp 97.5 °F (36.4 °C) (Oral)   Resp 22   Ht 5' 10\" (1.778 m)   Wt 192 lb 12.5 oz (87.4 kg)   SpO2 93%   BMI 27.66 kg/m²   TEMPERATURE:  Current - Temp: 97.5 °F (36.4 °C); Max - Temp  Av.7 °F (37.1 °C)  Min: 97.5 °F (36.4 °C)  Max: 100 °F (37.8 °C)    Physical Assessment:  General appearance: uncooperative, confused  Mental Status:  oriented to person  Lungs:  clear to auscultation bilaterally, normal effort  Heart:  regular rate and rhythm, no murmur  Abdomen:  soft, nontender, more distended, normal bowel sounds, no masses, hepatomegaly, splenomegaly  Extremities:  no edema, redness, tenderness in the calves  Skin:  no gross lesions, rashes, induration    Data Review:    Labs and Imaging:     CBC:  Recent Labs     22  0545 22  1650 23  0343 23  0625 23  1620 23  0959   WBC 4.5  --   --  3.5  --  4.2   HGB 7.6* 7.4* 8.7* 8.0* 7.9* 8.5*   MCV 87.3  --   --  89.5  --  89.3   RDW 19.6*  --   --  20.2*  --  20.5*   PLT 88*  --   --  84*  --  70*         ANEMIA STUDIES:  No results for input(s): LABIRON, TIBC, FERRITIN, FBWPDDOM80, FOLATE, OCCULTBLD in the last 72 hours.       BMP:  Recent Labs     23  1458 23  1619 23  0959    140 139   K 3.3* 3.4* 3.2*   * 110* 113*   CO2 17* 18* 18*   BUN 6* 6* 4*   CREATININE 0.55* 0.53* 0.40*   GLUCOSE 260* 257* 141*   CALCIUM 7.6* 7.6* 7.3*         LFTS:  Recent Labs     22  0545 23  0625 23  1458 23  1619 23  0959   ALKPHOS --  110 111 113 121   ALT  --  23 27 27 35   AST  --  84* 98* 101* 135*   BILITOT  --  5.2* 4.8* 4.9* 5.4*   BILIDIR 4.1*  --   --   --   --    LABALBU  --  2.2* 2.1* 2.2* 2.3*         Amylase/Lipase and Ammonia:  Recent Labs     12/31/22  0545 01/02/23  0959   AMMONIA 49 38         Acute Hepatitis Panel:  Lab Results   Component Value Date/Time    HEPBSAG NONREACTIVE 12/23/2020 05:09 PM    HEPCAB REACTIVE 12/23/2020 05:09 PM    HEPBIGM NONREACTIVE 12/23/2020 05:09 PM    HEPAIGM NONREACTIVE 08/19/2021 01:29 PM       HCV Genotype:  Lab Results   Component Value Date/Time    HEPATITISCGENOTYPE Mixed 09/06/2016 02:07 PM       HCV Quantitative:  Lab Results   Component Value Date/Time    HCVQNT NOT REPORTED 09/06/2016 02:07 PM       LIVER WORK UP:    AFP  Lab Results   Component Value Date/Time    AFP 2.5 04/13/2022 03:37 PM       Alpha 1 antitrypsin   Lab Results   Component Value Date/Time    A1A 152 08/19/2021 01:29 PM       MARYSOL  Lab Results   Component Value Date/Time    MARYSOL NEGATIVE 07/21/2016 09:29 PM       AMA  Lab Results   Component Value Date/Time    MITOAB 2.6 07/21/2016 09:29 PM       ASMA  No results found for: SMOOTHMUSCAB    PT/INR  Recent Labs     12/31/22  0545 01/01/23  0625 01/02/23  1055   PROTIME 14.1* 14.0* 14.7*   INR 1.4 1.3 1.4         Cancer Markers:  CEA:  No results for input(s): CEA in the last 72 hours. Ca 125:  No results for input(s):  in the last 72 hours. Ca 19-9:   Invalid input(s):   AFP: No results for input(s): AFP in the last 72 hours. Lactic acid:Invalid input(s): LACTIC ACID    Radiology Review:    US LIVER    Result Date: 12/28/2022  EXAMINATION: RIGHT UPPER QUADRANT ULTRASOUND 12/28/2022 4:16 pm COMPARISON: CT abdomen 12/27/2022 HISTORY: ORDERING SYSTEM PROVIDED HISTORY: check patency of TIPS FINDINGS: LIVER:  The liver demonstrates heterogeneous, multinodular pattern echogenicity without dominant hepatic lesion demonstrated sonographically.  No evidence of intrahepatic biliary ductal dilatation. TIPS: Duplex and Doppler waveform imaging of the tips was performed with the following flow velocities: Distal 30.6 cm/second, mid 29.1 cm/second, proximal 33.6 cm/second. BILIARY SYSTEM:  Gallbladder demonstrates a number of layering, echogenic shadowing stones. No wall thickening evident. Negative sonographic Silva's sign. Common bile duct is within normal limits measuring 3 mm. RIGHT KIDNEY: The right kidney is grossly unremarkable without evidence of hydronephrosis. Right kidney long dimension 11.2 cm. PANCREAS:  Visualized portions of the pancreas are unremarkable. OTHER: Small volume right upper quadrant ascites. 1.  Cholelithiasis without definite findings of acute cholecystitis. 2. Patent TIPS. Specific velocities recorded above. 3. Small volume abdominal ascites. 4. Hepatic morphologic features typical of cirrhosis. Active Problems:    Ascites due to alcoholic cirrhosis (HCC)    Shortness of breath    Portal hypertension (HCC)    Esophageal varices with bleeding (HCC)    Other abnormalities of gait and mobility    S/P TIPS (transjugular intrahepatic portosystemic shunt)    Acute blood loss anemia    Near syncope    Bleeding gastric varices    Hepatic encephalopathy    Periumbilical abdominal pain    Lezama's esophagus with dysplasia    Encephalopathy acute    Acute hyperactive alcohol withdrawal delirium (HCC)    Decompensation of cirrhosis of liver (HCC)    DDD (degenerative disc disease), lumbar    Acute upper GI hemorrhage    Gastrointestinal hemorrhage with melena    Mastoid disorder, bilateral  Resolved Problems:    * No resolved hospital problems. *       GI Impression:    Decompensated liver cirrhosis s/p TIPS revision 12/30/2022 with upsizing of stent and embolization of left gastric vein. Hgb dropped slightly to 7.6 g/dL.    -No overt bleeding noted  -Per IR note-if patient continues to have GI bleeding may consider alternative procedure BATO per IR  -Pt's increased Bilirubin is most likely due to shunting of blood supply (ie TIPS) causing hepatocyte injury  Grade 2 encephalopathy-secondary to underlying cirrhosis, recent TIPS revision  Esophageal and gastric varices s/p EGD 12/29/2022  Refractory ascites secondary to underlying liver disease, recent TIPS  Hyponatremia most likely secondary to underlying cirrhosis-resolved  Thrombocytopenia secondary to underlying cirrhosis      Plan and Recommendations:    Pt would benefit from paracentesis tomorrow  Continue lactulose 20 g every 4 hours. Titrate to 3-4 bm's daily  Soft diet  Daily labs including CBC BMP LFT INR  Monitor closely for acute decompensation  Monitor closely for electrolyte imbalance and treat accordingly  Will follow        This plan was formulated in collaboration with Dr. Gary Hilton MD    Thank you for allowing me to participate in the care of your patient. Please feel free to contact me with any questions or concerns. [address] [facility]   [place] [phone]  1/2/2023  1:07 PM    Estimated time of 45 mins reviewing chart, assessing patient and formulating plan of care    This note was created with the assistance of a speech-recognition program.  Although the intention is to generate a document that actually reflects the content of the visit, no guarantees can be provided that every mistake has been identified and corrected by editing.

## 2023-01-02 NOTE — CARE COORDINATION
Transitional Planning  Goal return home with spouse and [employer]. Has transportation. Awaiting PT/OT evals.

## 2023-01-02 NOTE — PROGRESS NOTES
Writer has attempted to send multiple message to Dr. Vilma Ramsey via perfect serve with no response. 1656: mild to moderate pericardial effusion on echo. 1708: bilat upper extremities weeping. 1832: bilateral upper extremities weeping. 1836: clarification on boluses ordered.   1850: lactic acid 3.1

## 2023-01-02 NOTE — PROGRESS NOTES
Dr. Linda Ramirez at 6910: \"nuc med wanted to clarify the hida scan. looks like order was placed on 12/30. do you still want it done? I messaged GI about HIDA as well and they stated they did not place order. Patient refusing lactulose. GI notified. I did notice on this morning assessment that patient has significant increase in swelling since yesterday. +2/3 pitting edema bilat upper extremities, left lower extermity as well. \" No message sent as a response. Orders placed. 9332: message to Dr. Jan Joseph to clarify orders for fluids. No response. 1015: Dr. Montoya He notified 1 of 2 blood cultures positive. Gram positive cocci in clusters identified as staph epidermis. Waiting for response. Read with no response at 1047.  1114: Dr. Jan Joseph notified potassium 3.2, phos 1.5. Read at [phone], no news orders or response. 1127: Dr. Jan Joseph notified of MEWS of 5. , /59, temp 97.5, RR 22. See orders. 1149: writer messaged to clarify mag and phos redraw time.   1205: Dr. Montoya He notified d-dimer 1.79

## 2023-01-02 NOTE — PROGRESS NOTES
[address] [facility] Pharmacokinetic Monitoring Service - Vancomycin    Consulting Provider: Dr. Nitish Cortés   Indication: aspiration pneumonia  Target Concentration: Goal AUC/CANDIDO 400-600 mg*hr/L  Day of Therapy: 2  Additional Antimicrobials: cefepime, metronidazole     Pertinent Laboratory Values: Wt Readings from Last 1 Encounters:   01/01/23 190 lb 10.6 oz (86.5 kg)     Temp Readings from Last 1 Encounters:   01/01/23 99.3 °F (37.4 °C) (Axillary)     Estimated Creatinine Clearance: 147 mL/min (A) (based on SCr of 0.53 mg/dL (L)). Recent Labs     12/31/22  0545 01/01/23  0625 01/01/23  1458 01/01/23  1619   CREATININE  --  0.48* 0.55* 0.53*   WBC 4.5 3.5  --   --        Pertinent Cultures:  Culture Date Source Results   01/01/23 blood No growth < 24 hours   12/30/22 urine No growth   MRSA Nasal Swab: was ordered by provider, awaiting results.     Recent vancomycin administrations                     vancomycin (VANCOCIN) 1250 mg in sodium chloride 0.9% 250 mL IVPB (mg) 1,250 mg New Bag 01/01/23 1747     1,250 mg New Bag  0504     1,250 mg New Bag 12/31/22 1652     1,250 mg New Bag  0532                    Assessment:  Date/Time Current Dose Concentration Timing of Concentration (h) AUC   01/01/23 2115 1250 mg every 12 hours  15.9 11 hour 16 min 530   Note: Serum concentrations collected for AUC dosing may appear elevated if collected in close proximity to the dose administered, this is not necessarily an indication of toxicity    Plan:  Current dosing regimen is therapeutic with a predicted AUC of 530 and trough of 16.2 at steady state  Continue current dose  Repeat vancomycin concentration not ordered at this time  Pharmacy will continue to monitor patient and adjust therapy as indicated    Thank you for the consult,  Ana Aden, [facility]  1/1/2023 9:12 PM

## 2023-01-02 NOTE — PROGRESS NOTES
[facility]  Office: [address], DO, Beatriz Aragon, DO, Bubba Valle, DO, [facility], DO, Tricia López MD, Maddison Horvath MD, Diego Back MD, Morgan Coreas MD,  Brenda Ledbetter MD, Pedro Pablo Segal MD, Onel Malone, DO, So Dennis MD,  Joaquina Javed MD, Jeanne Longo MD, Blayne Davison, DO, Amaury Rhodes MD, Maximiliano Garcia MD, Isaiah Gross, DO, Jack Baeza MD, Leoncio Lu MD, Rin Nunes MD, Vic Galeano MD, Charity Kong, DO, Jamee Herron MD, Hermelindo Story MD, Garret Waddell, CARMEL,  Herminio Jenkins, CNP, Jenna Farfan, CNP, Xavier Milner, CNP,  Burnett Osgood, [facility], Mercy Beltran, CNP, Katrina Safe, CNP, Francisca Patterson, CNP, Taisha Yu, CNP, Yadira Alarcon, [facility], Theresa Laguna PA-C, Jia Parish, [facility], Oly Werner, CNP, Alice Johnson, CNP         [address]    Progress Note    1/2/2023    6:39 PM    Name:   Ammon Coy  MRN:     [id number]     [place]:      [de-identified]   Room:   [id number]   Day:  5  Admit Date:  12/28/2022 11:30 PM    PCP:   VASU Cain  Code Status:  Full Code    Subjective:     C/C; improvement of swelling of right upper extremity after removal of IV line. Patient is arousable and answers questions. He denies any right upper quadrant abdominal pain on deep palpation to assess for acute cholecystitis. Much appreciated GI review of hyperbilirubinemia attributed to revision of TIPS with hepatocyte damage. Persistent lactic acidosis with refusal of lactulose. Currently on IV cefepime/vancomycin. Electrolytes replaced hypokalemia hypomagnesemia. Patient given 50 g of albumin followed by Lasix to manage third spacing. Patient refused HIDA scan. Elevated D-dimer level with coagulopathy with liver disease    Interval History Status: not changed.          Brief History:     Ascites due to alcoholic cirrhosis ([facility] 02/13/0752 Yes Shortness of breath 12/29/2022 Yes     Portal hypertension ([address] 12/29/2022 Yes     Esophageal varices with bleeding ([address] 12/29/2022 Yes     Other abnormalities of gait and mobility 12/29/2022 Yes     S/P TIPS (transjugular intrahepatic portosystemic shunt) 12/29/2022 Yes     Acute blood loss anemia 12/29/2022 Yes     Near syncope 12/29/2022 Yes     Bleeding gastric varices 12/29/2022 Yes     Hepatic encephalopathy 12/29/2022 Yes     Periumbilical abdominal pain 12/29/2022 Yes     Lezama's esophagus with dysplasia 12/30/2022 Yes     Mastoid disorder, bilateral 12/31/2022 Yes     Overview Signed 12/31/2022  4:50 PM by Aldo Whitley MD       biLateral mastoid effusion           Acute hyperactive alcohol withdrawal delirium ([address] 12/30/2022 Yes     Decompensated hepatic cirrhosis ([address] 12/29/2022 Yes     DDD (degenerative disc disease), lumbar 12/29/2022 Yes     Acute upper GI hemorrhage 12/29/2022 Yes     Gastrointestinal hemorrhage with melena           Review of Systems:     With a performed due to decreased mentation    Medications:      Allergies:  No Known Allergies    Current Meds:   Scheduled Meds:    sodium chloride  500 mL IntraVENous Once    metroNIDAZOLE  500 mg IntraVENous Q8H    cefepime  2,000 mg IntraVENous Q8H    insulin lispro  0.05 Units/kg SubCUTAneous TID WC    potassium chloride  10 mEq Oral BID    insulin lispro  0-4 Units SubCUTAneous TID WC    insulin lispro  0-4 Units SubCUTAneous Nightly    lactulose  20 g Oral 6 times per day    ipratropium-albuterol  1 ampule Inhalation 4x daily    sodium chloride flush  5-40 mL IntraVENous 2 times per day    sodium chloride flush  5-40 mL IntraVENous 2 times per day    thiamine  100 mg Oral Daily    baclofen  10 mg Oral TID    sodium chloride flush  5-40 mL IntraVENous 2 times per day    vancomycin (VANCOCIN) intermittent dosing (placeholder)   Other RX Placeholder    vancomycin  1,750 mg IntraVENous Once    Followed by    vancomycin  1,250 mg IntraVENous Q12H    sodium chloride flush  5-40 mL IntraVENous 2 times per day     Continuous Infusions:    sodium chloride Stopped (01/02/23 1422)    sodium chloride Stopped (01/02/23 1059)    dextrose      dextrose      sodium chloride      sodium chloride      sodium chloride      sodium chloride       PRN Meds: metoprolol, potassium chloride **OR** potassium alternative oral replacement **OR** potassium chloride, magnesium sulfate, sodium phosphate IVPB **OR** sodium phosphate IVPB **OR** sodium phosphate IVPB, glucose, dextrose bolus **OR** dextrose bolus, glucagon (rDNA), dextrose, LORazepam, glucose, dextrose bolus **OR** dextrose bolus, glucagon (rDNA), dextrose, sodium chloride flush, sodium chloride, hydrALAZINE, OLANZapine (ZyPREXA) in sterile water IntraMUSCular, sodium chloride flush, sodium chloride, LORazepam **OR** LORazepam **OR** LORazepam **OR** LORazepam **OR** LORazepam **OR** LORazepam **OR** LORazepam **OR** LORazepam, sodium chloride flush, sodium chloride, sodium chloride, LORazepam, diphenhydrAMINE, sodium chloride flush, ondansetron **OR** ondansetron    Data:     Past Medical History:   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 ([facility] Alcoholic cirrhosis of liver with ascites ([facility] [address] Anemia, Anxiety, Arthritis, Back pain, chronic, Lezama esophagus, BPH (benign prostatic hypertrophy), Cholelithiasis, Cirrhosis ([facility] [address] COVID-19, COVID-19 vaccine series completed, DDD (degenerative disc disease), lumbar, Depression, Esophageal cancer ([facility] [address] Es[facility] varices ([facility] Fatty liver, GERD (gastroesophageal reflux disease), GI bleed, Hep C w/o coma, chronic ([facility] History of alcohol abuse, History of blood transfusion, History of colon polyps, History of tobacco abuse, Craig (hard of hearing), Hyperlipidemia, Hypertension, Hyponatremia, Hypotension, Mastoid disorder, bilateral, PONV (postoperative nausea and vomiting), Port-A-Cath in place, Portal hypertension ([facility] Sciatica, Secondary esophageal varices (HCC), Shortness of breath, Spinal stenosis, Stomach ulcer, Thrombocytopenia ([address] Tubular adenoma of colon, Vitamin D deficiency, and Wears glasses. Social History:   reports that he quit smoking about 5 years ago. His smoking use included cigarettes. He has a 45.00 pack-year smoking history. He has never used smokeless tobacco. He reports that he does not currently use alcohol. He reports that he does not currently use drugs after having used the following drugs: Cocaine. Frequency: 1.00 time per week. Family History:   Family History   Problem Relation Age of Onset    Cancer Mother         pancreatic    Cancer Father         bone    Diabetes Sister     Asthma Brother     Allergies Brother         MULTIPLE       Vitals:  BP (!) 108/54   Pulse (!) 113   Temp 98.2 °F (36.8 °C) (Oral)   Resp 21   Ht 5' 10\" (1.778 m)   Wt 192 lb 12.5 oz (87.4 kg)   SpO2 95%   BMI 27.66 kg/m²   Temp (24hrs), Av.5 °F (36.9 °C), Min:97.5 °F (36.4 °C), Max:100 °F (37.8 °C)    Recent Labs     23  2000 23  0745 23  1136 23  1651   POCGLU 144* 137* 134* 130*       I/O (24Hr):     Intake/Output Summary (Last 24 hours) at 2023 1839  Last data filed at 2023 1834  Gross per 24 hour   Intake 3795.04 ml   Output 3700 ml   Net 95.04 ml       Labs:  Hematology:  Recent Labs     22  0545 22  1650 23  0625 23  1458 23  1619 23  1620 23  0959 23  1055 23  1648   WBC 4.5  --  3.5  --   --   --  4.2  --   --    RBC 3.00*  --  3.13*  --   --   --  3.35*  --   --    HGB 7.6*   < > 8.0*  --   --  7.9* 8.5*  --  7.5*   HCT 26.2*   < > 28.0*  --   --  27.8* 29.9*  --  26.6*   MCV 87.3  --  89.5  --   --   --  89.3  --   --    MCH 25.3  --  25.6  --   --   --  25.4  --   --    MCHC 29.0  --  28.6  --   --   --  28.4  --   --    RDW 19.6*  --  20.2*  --   --   --  20.5*  --   --    PLT 88*  --  84*  --   --   --  70*  --   --    MPV 9.8  -- 10.0  --   --   --  9.7  --   --    CRP 23.3*  --   --  26.9* 26.9*  --  31.8*  --   --    INR 1.4  --  1.3  --   --   --   --  1.4  --    DDIMER  --   --   --   --   --   --   --  1.79  --     < > = values in this interval not displayed. Chemistry:  Recent Labs     12/31/22  0545 01/01/23  0625 01/01/23  1325 01/01/23  1458 01/01/23  1619 01/02/23  0959 01/02/23  1406   NA  --    < >  --  138 140 139  --    K  --    < >  --  3.3* 3.4* 3.2*  --    CL  --    < >  --  109* 110* 113*  --    CO2  --    < >  --  17* 18* 18*  --    GLUCOSE  --    < >  --  260* 257* 141*  --    BUN  --    < >  --  6* 6* 4*  --    CREATININE  --    < >  --  0.55* 0.53* 0.40*  --    MG 1.4*   < >  --  1.9 2.0 1.7 1.4*   ANIONGAP  --    < >  --  12 12 8*  --    LABGLOM  --    < >  --  >60 >60 >60  --    CALCIUM  --    < >  --  7.6* 7.6* 7.3*  --    PHOS 3.6  --   --   --   --  1.5* 1.4*   PROBNP  --   --   --  417*  --  563*  --    LACTACIDWB  --   --  4.5*  --   --  3.2* 3.4*    < > = values in this interval not displayed. Recent Labs     12/31/22  0545 12/31/22  0808 12/31/22  1222 12/31/22  1945 01/01/23  0625 01/01/23  1458 01/01/23  1619 01/01/23  2000 01/02/23  0745 01/02/23  0959 01/02/23  1136 01/02/23  1651   PROT  --   --   --   --    < > 4.2* 4.2*  --   --  4.6*  --   --    LABALBU  --   --   --   --    < > 2.1* 2.2*  --   --  2.3*  --   --    TSH  --   --   --   --   --   --  0.19*  --   --   --   --   --    AST  --   --   --   --    < > 98* 101*  --   --  135*  --   --    ALT  --   --   --   --    < > 27 27  --   --  35  --   --    ALKPHOS  --   --   --   --    < > 111 113  --   --  121  --   --    BILITOT  --   --   --   --    < > 4.8* 4.9*  --   --  5.4*  --   --    BILIDIR 4.1*  --   --   --   --   --   --   --   --   --   --   --    AMMONIA 49  --   --   --   --   --   --   --   --  38  --   --    POCGLU  --    < > 82 167*  --   --   --  144* 137*  --  134* 130*    < > = values in this interval not displayed. ABG:  Lab Results   Component Value Date/Time    FIO2 INFORMATION NOT PROVIDED 12/30/2022 01:35 AM     Lab Results   Component Value Date/Time    SPECIAL RT HAND 5ML 01/01/2023 02:36 PM     Lab Results   Component Value Date/Time    CULTURE NO GROWTH 1 DAY 01/01/2023 02:36 PM       Radiology:  XR CHEST (SINGLE VIEW FRONTAL)    Result Date: 12/27/2022  1. Minimal bibasilar atelectatic changes with trace left pleural fluid which is slightly decreased from 12/09/2022. XR RADIUS ULNA RIGHT (2 VIEWS)    Result Date: 1/1/2023  No fracture or dislocation. Mild soft tissue swelling     CT HEAD WO CONTRAST    Result Date: 12/31/2022  CT BRAIN: 1. No evidence of acute hemorrhage, large territorial hypodensity, significant mass effect/midline shift, or ventriculomegaly 2. Involutional parenchymal changes. 3. Age-indeterminate (likely chronic) left basal ganglia lacunar infarct. If clinically indicated, can consider further evaluation with MRI if no contraindications. CT CERVICAL SPINE: 1. No acute abnormality of the cervical spine. 2. Multilevel cervical spondylosis, most notable at C5-C6 and C6-C7. No high-grade bony spinal canal stenosis. CT CERVICAL SPINE WO CONTRAST    Result Date: 12/31/2022  CT BRAIN: 1. No evidence of acute hemorrhage, large territorial hypodensity, significant mass effect/midline shift, or ventriculomegaly 2. Involutional parenchymal changes. 3. Age-indeterminate (likely chronic) left basal ganglia lacunar infarct. If clinically indicated, can consider further evaluation with MRI if no contraindications. CT CERVICAL SPINE: 1. No acute abnormality of the cervical spine. 2. Multilevel cervical spondylosis, most notable at C5-C6 and C6-C7. No high-grade bony spinal canal stenosis.      CT ABDOMEN PELVIS W IV CONTRAST Additional Contrast? None    Result Date: 12/27/2022  Stable circumferential wall thickening of the distal esophagus and gastroesophageal junction consistent with known esophageal malignancy. No significant change since prior examination. No esophageal obstruction. Cirrhotic liver with associated portal venous hypertension, progressive ascites and stable splenomegaly. TIPS appears in place and patent Cholelithiasis Interval development of bibasal infiltrates and moderate bilateral pleural effusions as well as mild-to-moderate pericardial effusion     IR FLUORO GUIDED NEEDLE PLACEMENT    Result Date: 12/28/2022  Successful ultrasound-guided placement of a right upper extremity midline venous catheter. US LIVER    Result Date: 12/28/2022  1. Cholelithiasis without definite findings of acute cholecystitis. 2. Patent TIPS. Specific velocities recorded above. 3. Small volume abdominal ascites. 4. Hepatic morphologic features typical of cirrhosis. US GALLBLADDER RUQ    Result Date: 12/30/2022  Multiple cholelithiasis but no ultrasound evidence cholecystitis. Nondilated biliary tree. Cirrhosis. Patent TIPS. Ascites. Additional findings, as above. XR CHEST PORTABLE    Result Date: 1/2/2023  Mild pulmonary vascular congestion without evidence of overt edema     XR CHEST PORTABLE    Result Date: 12/31/2022  Little change from prior study. Cardiomegaly, vascular congestion, effusions, atelectasis and possible superimposed acute airspace disease are again noted. XR CHEST PORTABLE    Result Date: 12/30/2022  Cardiomegaly with probable mild central vascular congestion and similar bilateral pleural effusions with compressive atelectasis; filtrate at either base a consideration. No pulmonary edema. IR US GUIDED PARACENTESIS    Result Date: 12/28/2022  Successful ultrasound-guided therapeutic and diagnostic paracentesis. IR REVISION TIPS    Result Date: 1/2/2023  TIPS stent reassessment showing 6 mm Hg portosystemic shunt; no significant variceal filling demonstrated, but successful left gastric vein embolization performed.   TIPS stent angioplasty to 10 mm with excellent flow demonstrated. The findings were discussed with Dr. Diego Ramirez post procedure. MRI BRAIN WO CONTRAST    Result Date: 1/2/2023  No evidence of acute ischemia. New moderate-sized bilateral mastoid effusions. Small old lacune within the left basal ganglia. New low T1 signal within the marrow elements diffusely likely representing a marrow replacement process such as anemia. Physical Examination:      General appearance: Patient is more awake but still somewhat confused. Resolution of marked swelling of the right forearm with right IV site   Mental Status: Awake , notalert  Lungs: Scattered rhonchi on deep inspiratory auscultation bilaterally, normal effort  Heart:  regular rate and rhythm, no murmur  Abdomen:  soft, no distinct tenderness elicited in the right upper quadrant as evident bs, hepatomegaly, splenomegaly  Extremities: Slight edema noted all 4 extremities secondary to third spacing due to severe hypoalbuminemia from his liver disease. Skin: Patchy erythema of right forearm and wrist noted slightly warm         Echocardiogram-1/2/2023  Left ventricle is normal in size. Mild left ventricular hypertrophy. Global left ventricular systolic function is normal. Estimated LV EF 60-65  %. Left atrium is mildly dilated. No significant valvular regurgitation or stenosis seen. No significant pericardial effusion is seen.   Normal aortic root dimension    Hospital Problems             Last Modified POA    Ascites due to alcoholic cirrhosis ([address] 60/09/9139 Yes    Shortness of breath 12/29/2022 Yes    Portal hypertension ([address] 12/29/2022 Yes    Esophageal varices with bleeding ([address] 12/29/2022 Yes    Other abnormalities of gait and mobility 12/29/2022 Yes    S/P TIPS (transjugular intrahepatic portosystemic shunt) 12/29/2022 Yes    Acute blood loss anemia 12/29/2022 Yes    Near syncope 12/29/2022 Yes    Bleeding gastric varices 12/29/2022 Yes    Hepatic encephalopathy 12/29/2022 Yes Periumbilical abdominal pain 12/29/2022 Yes    Lezama's esophagus with dysplasia 12/30/2022 Yes    Mastoid disorder, bilateral 12/31/2022 Yes    Overview Signed 12/31/2022  4:50 PM by Fran Constantino MD     biLateral mastoid effusion         Encephalopathy acute 1/1/2023 Yes    Acute hyperactive alcohol withdrawal delirium ([facility] [address] 12/30/2022 Yes    Decompensation of cirrhosis of liver ([facility] [address] 1/1/2023 Yes    DDD (degenerative disc disease), lumbar 12/29/2022 Yes    Acute upper GI hemorrhage 12/29/2022 Yes    Gastrointestinal hemorrhage with melena 12/29/2022 Yes   69-year-old gentleman with known history of chronic alcohol abuse admitted with acute GI bleed with Lezama's esophagus Lezama's esophagus/esophageal cancer esophageal cancer with upper endoscopy revealing moderately large varices with moderate portal hypertensive gastropathy. Status post therapeutic paracentesis 4.5 L with positive leukocytosis on IV Zosyn for SBP prophylaxis which was switched to IV cefepime/vancomycin for pneumonia with high risk for Pseudomonas and MRSA. Agustin Prakash 3 units blood transfusion. Chest x-ray showed bilateral pneumonia with mild to moderate pericardial effusion with recent echocardiogram showing no confirmation. IV antibiotics changed from Zosyn to cefepime and vancomycin for aspiration pneumonia coverage with possible complication from Pseudomonas due to his esophageal cancer history. Elevated CRP 19.5 which is rising to 23.3. Elevated total bilirubin 4.7 for further work-up for biliary obstruction with HIDA scan and bilirubin fractionation. Confirmed cholelithiasis. Without cholecystitis  Episode of fall with decreased mentation with CT of head and neck and MRI of head showed old lacunar infarct of left basal ganglia, bilateral mastoid effusion discussed with ENT and confirmed on MRI of brain. Mildly elevated ammonia level.   Principal diagnosis:  #History of GI bleed secondary to portal hypertension with alcoholic cirrhosis with moderate to large varices requiring 3 units of blood with restrictive transfusion to maintain hemoglobin at 8. Placed on IV Zosyn for SBP prophylaxis for ascites removed 4.5 L with therapeutic paracentesis. Status post revision of TIPS. DC'd IV Zosyn currently and IV cefepime/vancomycin due to aspiration pneumonia continue IV pantoprazole, octreotide. he had refused lactulose. Active diagnoses  #Sepsis with elevated lactic acid currently 3.4 started on normal saline bolus 30 cc/kg and continuous at 150 cc/h per sepsis protocol initiated. Flagyl added due to suspected abdominal source for infection. Urinalysis pending. #Bilateral basilar pneumonia with high risk for aspiration with consideration for Pseudomonas pneumonia. Change IV Zosyn to IV cefepime and vancomycin with pharmacy to dose pending MRSA of nose. Elevated CRP was 19.5 rising to 23.3. Pending procalcitonin level. Follow-up chest x-ray. Sputum culture and sensitivity. elevated respiration 33/min pulse 124/min. Elevated lactic acid level 4.4 now 6.6 with IV normal saline bolus initiated and IV Flagyl added to cefepime, vancomycin    #Elevated bilirubin total bilirubin 4.7 with suspected right upper quadrant pain on palpation. Pending ultrasound of gallbladder. Direct bilirubin alkaline phosphatase HIDA scan refused by on IV cefepime GI consult reviewed with hyperbilirubinemia secondary to revisions of TIPS with hepatocyte damage. Tender in the right upper quadrant with CT scanning of abdomen confirming no acute cholecystitis. Karena Valle #Elevated ammonia level-lactulose. #History of unwitnessed fall with decreased mentation-negative CT of head and C-spine to rule out intracranial bleed-results reviewed  #Suggestive protein calorie malnutrition with total protein 4.7, albumin 2.7.-Enteral hyperalimentation.   #Chronic alcohol abuse-currently on Ativan for withdrawal with visual hallucinations drinking 6 beers a day for the last 50 years. Continue CIWA score monitor patient closely. #New onset swelling of right forearm with IV line and right arm which needs to be discontinued and changed. X-ray of right forearm due to history of fall to rule out fracture. #Sinus tachycardia confirmed on EKG. IV metoprolol 2.5 mg every 6 hours as needed for heart rate above 130 due to current sepsis being actively treated    Plan:        paracentesis  Continue monitoring lactic acid with vigorous IV hydration for resolution. Consider additional IV albumin to mobilize extravascular fluid followed by Lasix 3 hours later to reduce third spacing. Continue IV antibiotics with monitoring of CRP. Chest x-ray. Electrolyte  Condition guarded to critical with poor prognosis.   Full code resuscitation  Disposition to be determined  Bradley Danielson MD  1/2/2023  6:39 PM

## 2023-01-02 NOTE — PROGRESS NOTES
Physical Therapy  Facility/Department: [address] STEPDOWN  Physical Therapy Initial Assessment    Name: Nneka Lerma  :   MRN: [id number]  Date of Service: 2023  \"60year-old gentleman with known history of chronic alcohol abuse admitted with acute GI bleed with Lezama's esophagus/esophageal cancer with upper endoscopy revealing moderately large varices with moderate portal hypertensive gastropathy. Status post therapeutic paracentesis 4.5 L with positive leukocytosis on IV Zosyn for SBP prophylaxis. 3 units blood transfusion. Electrolytes replaced. Full code resuscitation status. \"    Discharge Recommendations:  Patient would benefit from continued therapy after discharge   PT Equipment Recommendations  Equipment Needed: No (Continue to assess pending progression of mobility)      Patient Diagnosis(es): There were no encounter diagnoses. 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 ([address] Alcoholic cirrhosis of liver with ascites ([address] Anemia, Anxiety, Arthritis, Back pain, chronic, Lezama esophagus, BPH (benign prostatic hypertrophy), Cholelithiasis, Cirrhosis ([address] COVID-19, COVID-19 vaccine series completed, DDD (degenerative disc disease), lumbar, Depression, Esophageal cancer ([address] Esophageal varices ([address] Fatty liver, GERD (gastroesophageal reflux disease), GI bleed, Hep C w/o coma, chronic ([address] History of alcohol abuse, History of blood transfusion, History of colon polyps, History of tobacco abuse, [place] (hard of hearing), Hyperlipidemia, Hypertension, Hyponatremia, Hypotension, Mastoid disorder, bilateral, PONV (postoperative nausea and vomiting), Port-A-Cath in place, Portal hypertension ([address] Sciatica, Secondary esophageal varices ([address] Shortness of breath, Spinal stenosis, Stomach ulcer, Thrombocytopenia ([address] Tubular adenoma of colon, Vitamin D deficiency, and Wears glasses.   Past Surgical History:  has a past surgical history that includes Bunionectomy; Nasal septum surgery; other surgical history (01/04/2016); Colonoscopy; Colonoscopy (10/05/2016); other surgical history (11/21/2016); other surgical history (12/19/2016); knee surgery (Left); Bunionectomy (Left); Endoscopy, colon, diagnostic; pr neuroplasty &/transpos median nrv carpal tunne (Right, 08/29/2017); Carpal tunnel release (Right); pr neuroplasty &/transpos median nrv carpal tunne (Left, 10/31/2017); Colonoscopy (N/A, 03/30/2018); Colonoscopy (03/30/2018); pr njx aa&/strd tfrml epi lumbar/sacral 1 level (Bilateral, 09/06/2018); other surgical history (09/28/2018); pr njx aa&/strd tfrml epi lumbar/sacral 1 level (N/A, 09/28/2018); Pain management procedure (Left, 07/09/2020); Pain management procedure (Left, 07/20/2020); Pain management procedure (Bilateral, 08/17/2020); other surgical history (Right, 11/23/2020); Nerve Block (Right, 11/23/2020); Pain management procedure (Bilateral, 12/07/2020); Upper gastrointestinal endoscopy (N/A, 12/29/2020); Upper gastrointestinal endoscopy (N/A, 02/02/2021); Upper gastrointestinal endoscopy (N/A, 02/12/2021); Upper gastrointestinal endoscopy (02/12/2021); [place] tooth extraction; IR PORT PLACEMENT > 5 YEARS (04/19/2021); Upper gastrointestinal endoscopy (N/A, 08/31/2021); Upper gastrointestinal endoscopy (N/A, 01/21/2022); Upper gastrointestinal endoscopy (N/A, 04/15/2022); Colonoscopy (N/A, 04/16/2022); Upper gastrointestinal endoscopy (N/A, 06/06/2022); Upper gastrointestinal endoscopy (N/A, 06/09/2022); Paracentesis; Upper gastrointestinal endoscopy (N/A, 09/14/2022); Upper gastrointestinal endoscopy (N/A, 10/19/2022); Upper gastrointestinal endoscopy (N/A, 10/31/2022); IR TIPS INSERTION (12/01/2022); Esophagogastroduodenoscopy (12/29/2022); and Upper gastrointestinal endoscopy (12/29/2022). Assessment   Body Structures, Functions, Activity Limitations Requiring Skilled Therapeutic Intervention: Decreased functional mobility ; Decreased strength; Increased pain;Decreased posture;Decreased cognition;Decreased ROM; Decreased endurance;Decreased balance  Assessment: Pt required Natalie to perform bed mobility, able to sit EOB CGA for ~4 minutes prior to requiring return to supine due to tachycardia of 156bpm while seated EOB. Pt is a fall risk and will require 24hr support upon discharge due to cognition deficits and level of assistance required to safely perform functional mobility. Recommending continued skilled physical therapy to address these deficits and return pt to prior level of function.   Therapy Prognosis: Fair  Decision Making: Medium Complexity  Requires PT Follow-Up: Yes  Activity Tolerance  Activity Tolerance: Treatment limited secondary to decreased cognition;Treatment limited secondary to medical complications  Activity Tolerance Comments: pt's limited due to tachycardia throughout session     Plan   Physcial Therapy Plan  General Plan:  (5-6x/week)  Current Treatment Recommendations: Strengthening, ROM, Balance training, Functional mobility training, Transfer training, Endurance training, Gait training, Stair training, Safety education & training, Patient/Caregiver education & training, Equipment evaluation, education, & procurement, Therapeutic activities, Home exercise program  Safety Devices  Type of Devices: Nurse notified, Left in bed, Call light within reach, Bed alarm in place, Patient at risk for falls  Restraints  Restraints Initially in Place: No     Restrictions  Restrictions/Precautions  Restrictions/Precautions: Fall Risk, Bedrest with Bathroom Privileges (up w/assist; up as tolerated)  Required Braces or Orthoses?: No     Subjective   General  Patient assessed for rehabilitation services?: Yes  Response To Previous Treatment: Not applicable  Family / Caregiver Present: No  Follows Commands: Within Functional Limits  General Comment  Comments: pt denies pain at time of evaluation  Subjective  Subjective: RN and pt in agreement for PT eval. Pt supine in bed upon PT arrival, pt alert and oriented, however, frequently asks \"how long will I be here in this program?\" requiring reorientation to hospital setting         Social/Functional History  Social/Functional History  Lives With: Alone  Type of Home: House  Home Layout: One level  Home Access: Stairs to enter without rails  Entrance Stairs - Number of Steps: 3  Bathroom Shower/Tub: Tub/Shower unit  Bathroom Toilet: Standard  Home Equipment: Rollator, [employer] Help From: Family  ADL Assistance: Independent  Homemaking Assistance: Independent  Homemaking Responsibilities: Yes (pt reports family assists prn with household tasks)  Ambulation Assistance: Independent  Transfer Assistance: Independent  Active : Yes  Mode of Transportation: Grow  Occupation: Retired  Type of Occupation:   Leisure & Hobbies: \"Have fun\"  Additional Comments: Pt reports ex-wife is able to provide prn assist upon discharge  Vision/Hearing  Vision  Vision: Impaired  Vision Exceptions: Wears glasses at all times  Hearing  Hearing: Within functional limits    Cognition   Orientation  Overall Orientation Status: Within Functional Limits  Cognition  Overall Cognitive Status: Exceptions  Following Commands:  Follows multistep commands with increased time  Attention Span: Difficulty dividing attention  Safety Judgement: Decreased awareness of need for safety;Decreased awareness of need for assistance  Problem Solving: Assistance required to generate solutions  Insights: Decreased awareness of deficits  Initiation: Requires cues for some  Sequencing: Requires cues for some     Objective   Observation/Palpation  Edema: Significant pitting edema noted of bilateral UE  Gross Assessment  Sensation: Intact     Joint Mobility  ROM RLE: WFL  ROM LLE: WFL  ROM RUE: WFL  ROM LUE: WFL  Strength RLE  Strength RLE: WFL  Strength LLE  Strength LLE: WFL  Strength RUE  Strength RUE: WFL  Strength LUE  Strength LUE: WFL    Bed mobility  Supine to Sit: Minimal assistance  Sit to Supine: Minimal assistance  Bed Mobility Comments: Max verbal cueing required for sequencing and intiation of mobility with good return demo. Pt tachycardic with performance of functional mobility, HR fluctuating from 104-156bpm while seated EOB for ~4 minutes. RN present at bedside and aware. Pt safely returned to supine position with HR return to 119bpm.  Transfers  Comment: unable to formally assess due to tachycardia  Ambulation  Comments: unable to formally assess due to tachycardia while seated EOB  More Ambulation?: No  Stairs/Curb  Stairs?: No     Balance  Posture: Fair  Sitting - Static: Good;-  Sitting - Dynamic: Fair;+  Comments: pt able to sit EOB CGA once established; unable to formally assess standing balance at this time due to tachycardia      AM-PAC Score  AM-PAC Inpatient Mobility Raw Score : 18 (01/02/23 1541)  AM-PAC Inpatient T-Scale Score : 43.63 (01/02/23 1541)  Mobility Inpatient CMS 0-100% Score: 46.58 (01/02/23 1541)  Mobility Inpatient CMS G-Code Modifier : CK (01/02/23 1541)      Goals  Short Term Goals  Time Frame for Short Term Goals: 14  Short Term Goal 1: Pt to perform bed mobility independently  Short Term Goal 2: Demonstrate functional transfer independently  Short Term Goal 3: Pt to ambulate 100ft w/ least restrictive AD independently  Short Term Goal 4: Tolerate 30 minutes of therapy to demo increased endurance  Patient Goals   Patient Goals :  To go home       Education  Patient Education  Education Given To: Patient  Education Provided: Role of Therapy;Plan of Care  Education Method: Demonstration  Barriers to Learning: None  Education Outcome: Verbalized understanding;Demonstrated understanding      Therapy Time   Individual Concurrent Group Co-treatment   Time In 1043         Time Out 1117         Minutes 34         Timed Code Treatment Minutes: 23 Minutes       Milton Celeste, PT

## 2023-01-03 ENCOUNTER — ANESTHESIA EVENT (OUTPATIENT)
Dept: OPERATING ROOM | Age: 63
End: 2023-01-03
Payer: MEDICARE

## 2023-01-03 ENCOUNTER — APPOINTMENT (OUTPATIENT)
Dept: GENERAL RADIOLOGY | Age: 63
End: 2023-01-03
Attending: INTERNAL MEDICINE
Payer: MEDICARE

## 2023-01-03 ENCOUNTER — APPOINTMENT (OUTPATIENT)
Dept: INTERVENTIONAL RADIOLOGY/VASCULAR | Age: 63
End: 2023-01-03
Attending: INTERNAL MEDICINE
Payer: MEDICARE

## 2023-01-03 ENCOUNTER — ANESTHESIA (OUTPATIENT)
Dept: OPERATING ROOM | Age: 63
End: 2023-01-03
Payer: MEDICARE

## 2023-01-03 ENCOUNTER — APPOINTMENT (OUTPATIENT)
Dept: CT IMAGING | Age: 63
End: 2023-01-03
Attending: INTERNAL MEDICINE
Payer: MEDICARE

## 2023-01-03 LAB
ABO/RH: NORMAL
ABSOLUTE EOS #: 0 K/UL (ref 0–0.4)
ABSOLUTE IMMATURE GRANULOCYTE: 0 K/UL (ref 0–0.3)
ABSOLUTE LYMPH #: 0.19 K/UL (ref 1–4.8)
ABSOLUTE MONO #: 0.48 K/UL (ref 0.1–0.8)
ALBUMIN FLUID: 0.7 G/DL
ALBUMIN SERPL-MCNC: 2.3 G/DL (ref 3.5–5.2)
ALBUMIN/GLOBULIN RATIO: 1.4 (ref 1–2.5)
ALP BLD-CCNC: 95 U/L (ref 40–129)
ALT SERPL-CCNC: 30 U/L (ref 5–41)
AMMONIA: 36 UMOL/L (ref 16–60)
ANION GAP SERPL CALCULATED.3IONS-SCNC: 8 MMOL/L (ref 9–17)
ANTIBODY SCREEN: NEGATIVE
ARM BAND NUMBER: NORMAL
AST SERPL-CCNC: 110 U/L
BASOPHILS # BLD: 0 % (ref 0–2)
BASOPHILS ABSOLUTE: 0 K/UL (ref 0–0.2)
BILIRUB SERPL-MCNC: 5 MG/DL (ref 0.3–1.2)
BUN BLDV-MCNC: 4 MG/DL (ref 8–23)
CALCIUM SERPL-MCNC: 7.5 MG/DL (ref 8.6–10.4)
CASE NUMBER:: NORMAL
CHLORIDE BLD-SCNC: 111 MMOL/L (ref 98–107)
CO2: 21 MMOL/L (ref 20–31)
CREAT SERPL-MCNC: 0.4 MG/DL (ref 0.7–1.2)
CULTURE: ABNORMAL
DIRECT EXAM: ABNORMAL
EOSINOPHILS RELATIVE PERCENT: 0 % (ref 1–4)
EXPIRATION DATE: NORMAL
GFR SERPL CREATININE-BSD FRML MDRD: >60 ML/MIN/1.73M2
GLUCOSE BLD-MCNC: 102 MG/DL (ref 70–99)
GLUCOSE BLD-MCNC: 108 MG/DL (ref 75–110)
GLUCOSE BLD-MCNC: 109 MG/DL (ref 75–110)
GLUCOSE BLD-MCNC: 110 MG/DL (ref 75–110)
GLUCOSE BLD-MCNC: 96 MG/DL (ref 75–110)
GLUCOSE BLD-MCNC: 97 MG/DL (ref 75–110)
HCT VFR BLD CALC: 24.9 % (ref 40.7–50.3)
HCT VFR BLD CALC: 25.2 % (ref 40.7–50.3)
HCT VFR BLD CALC: 27.4 % (ref 40.7–50.3)
HCT VFR BLD CALC: 29.1 % (ref 40.7–50.3)
HEMOGLOBIN: 7.2 G/DL (ref 13–17)
HEMOGLOBIN: 7.6 G/DL (ref 13–17)
HEMOGLOBIN: 7.9 G/DL (ref 13–17)
HEMOGLOBIN: 8 G/DL (ref 13–17)
IMMATURE GRANULOCYTES: 0 %
INR BLD: 1.6
LACTIC ACID, WHOLE BLOOD: 1.5 MMOL/L (ref 0.7–2.1)
LACTIC ACID, WHOLE BLOOD: 2 MMOL/L (ref 0.7–2.1)
LYMPHOCYTES # BLD: 4 % (ref 24–44)
Lab: ABNORMAL
MAGNESIUM: 2.1 MG/DL (ref 1.6–2.6)
MCH RBC QN AUTO: 25.4 PG (ref 25.2–33.5)
MCHC RBC AUTO-ENTMCNC: 28.9 G/DL (ref 28.4–34.8)
MCV RBC AUTO: 87.7 FL (ref 82.6–102.9)
MONOCYTES # BLD: 10 % (ref 1–7)
MORPHOLOGY: ABNORMAL
MORPHOLOGY: ABNORMAL
NRBC AUTOMATED: 0 PER 100 WBC
PDW BLD-RTO: 20.7 % (ref 11.8–14.4)
PHOSPHORUS: 1.7 MG/DL (ref 2.5–4.5)
PLATELET # BLD: 70 K/UL (ref 138–453)
PMV BLD AUTO: 10.8 FL (ref 8.1–13.5)
POTASSIUM SERPL-SCNC: 3.3 MMOL/L (ref 3.7–5.3)
PRO-BNP: 970 PG/ML
PROTHROMBIN TIME: 16 SEC (ref 9.1–12.3)
RBC # BLD: 2.84 M/UL (ref 4.21–5.77)
SEG NEUTROPHILS: 86 % (ref 36–66)
SEGMENTED NEUTROPHILS ABSOLUTE COUNT: 4.13 K/UL (ref 1.8–7.7)
SODIUM BLD-SCNC: 140 MMOL/L (ref 135–144)
SPECIMEN DESCRIPTION: ABNORMAL
SPECIMEN DESCRIPTION: ABNORMAL
SPECIMEN TYPE: NORMAL
TOTAL PROTEIN: 4 G/DL (ref 6.4–8.3)
WBC # BLD: 4.8 K/UL (ref 3.5–11.3)

## 2023-01-03 PROCEDURE — 2060000000 HC ICU INTERMEDIATE R&B

## 2023-01-03 PROCEDURE — 6360000002 HC RX W HCPCS: Performed by: INTERNAL MEDICINE

## 2023-01-03 PROCEDURE — 83880 ASSAY OF NATRIURETIC PEPTIDE: CPT

## 2023-01-03 PROCEDURE — 3609017100 HC EGD: Performed by: INTERNAL MEDICINE

## 2023-01-03 PROCEDURE — 0W9G3ZZ DRAINAGE OF PERITONEAL CAVITY, PERCUTANEOUS APPROACH: ICD-10-PCS | Performed by: RADIOLOGY

## 2023-01-03 PROCEDURE — 88112 CYTOPATH CELL ENHANCE TECH: CPT

## 2023-01-03 PROCEDURE — 84100 ASSAY OF PHOSPHORUS: CPT

## 2023-01-03 PROCEDURE — 99232 SBSQ HOSP IP/OBS MODERATE 35: CPT | Performed by: INTERNAL MEDICINE

## 2023-01-03 PROCEDURE — 0DJ08ZZ INSPECTION OF UPPER INTESTINAL TRACT, VIA NATURAL OR ARTIFICIAL OPENING ENDOSCOPIC: ICD-10-PCS | Performed by: INTERNAL MEDICINE

## 2023-01-03 PROCEDURE — 3700000001 HC ADD 15 MINUTES (ANESTHESIA): Performed by: INTERNAL MEDICINE

## 2023-01-03 PROCEDURE — 2700000000 HC OXYGEN THERAPY PER DAY

## 2023-01-03 PROCEDURE — 85610 PROTHROMBIN TIME: CPT

## 2023-01-03 PROCEDURE — 2580000003 HC RX 258: Performed by: INTERNAL MEDICINE

## 2023-01-03 PROCEDURE — 85014 HEMATOCRIT: CPT

## 2023-01-03 PROCEDURE — 74177 CT ABD & PELVIS W/CONTRAST: CPT

## 2023-01-03 PROCEDURE — 6370000000 HC RX 637 (ALT 250 FOR IP): Performed by: INTERNAL MEDICINE

## 2023-01-03 PROCEDURE — 43235 EGD DIAGNOSTIC BRUSH WASH: CPT | Performed by: INTERNAL MEDICINE

## 2023-01-03 PROCEDURE — 2580000003 HC RX 258: Performed by: NURSE PRACTITIONER

## 2023-01-03 PROCEDURE — 3700000000 HC ANESTHESIA ATTENDED CARE: Performed by: INTERNAL MEDICINE

## 2023-01-03 PROCEDURE — C9113 INJ PANTOPRAZOLE SODIUM, VIA: HCPCS | Performed by: NURSE PRACTITIONER

## 2023-01-03 PROCEDURE — 2709999900 HC NON-CHARGEABLE SUPPLY

## 2023-01-03 PROCEDURE — 6360000002 HC RX W HCPCS: Performed by: NURSE PRACTITIONER

## 2023-01-03 PROCEDURE — 6370000000 HC RX 637 (ALT 250 FOR IP): Performed by: NURSE PRACTITIONER

## 2023-01-03 PROCEDURE — 2500000003 HC RX 250 WO HCPCS: Performed by: INTERNAL MEDICINE

## 2023-01-03 PROCEDURE — 94761 N-INVAS EAR/PLS OXIMETRY MLT: CPT

## 2023-01-03 PROCEDURE — 2580000003 HC RX 258: Performed by: ANESTHESIOLOGY

## 2023-01-03 PROCEDURE — 86901 BLOOD TYPING SEROLOGIC RH(D): CPT

## 2023-01-03 PROCEDURE — 36415 COLL VENOUS BLD VENIPUNCTURE: CPT

## 2023-01-03 PROCEDURE — 6360000002 HC RX W HCPCS

## 2023-01-03 PROCEDURE — 86850 RBC ANTIBODY SCREEN: CPT

## 2023-01-03 PROCEDURE — 82140 ASSAY OF AMMONIA: CPT

## 2023-01-03 PROCEDURE — 86900 BLOOD TYPING SEROLOGIC ABO: CPT

## 2023-01-03 PROCEDURE — 7100000000 HC PACU RECOVERY - FIRST 15 MIN: Performed by: INTERNAL MEDICINE

## 2023-01-03 PROCEDURE — 87070 CULTURE OTHR SPECIMN AEROBIC: CPT

## 2023-01-03 PROCEDURE — 94640 AIRWAY INHALATION TREATMENT: CPT

## 2023-01-03 PROCEDURE — 49083 ABD PARACENTESIS W/IMAGING: CPT

## 2023-01-03 PROCEDURE — 83735 ASSAY OF MAGNESIUM: CPT

## 2023-01-03 PROCEDURE — 85018 HEMOGLOBIN: CPT

## 2023-01-03 PROCEDURE — 71045 X-RAY EXAM CHEST 1 VIEW: CPT

## 2023-01-03 PROCEDURE — 80053 COMPREHEN METABOLIC PANEL: CPT

## 2023-01-03 PROCEDURE — 82947 ASSAY GLUCOSE BLOOD QUANT: CPT

## 2023-01-03 PROCEDURE — 6360000004 HC RX CONTRAST MEDICATION: Performed by: NURSE PRACTITIONER

## 2023-01-03 PROCEDURE — 89051 BODY FLUID CELL COUNT: CPT

## 2023-01-03 PROCEDURE — 87075 CULTR BACTERIA EXCEPT BLOOD: CPT

## 2023-01-03 PROCEDURE — 2580000003 HC RX 258

## 2023-01-03 PROCEDURE — 99222 1ST HOSP IP/OBS MODERATE 55: CPT | Performed by: INTERNAL MEDICINE

## 2023-01-03 PROCEDURE — 85025 COMPLETE CBC W/AUTO DIFF WBC: CPT

## 2023-01-03 PROCEDURE — 82042 OTHER SOURCE ALBUMIN QUAN EA: CPT

## 2023-01-03 PROCEDURE — 88305 TISSUE EXAM BY PATHOLOGIST: CPT

## 2023-01-03 PROCEDURE — 7100000001 HC PACU RECOVERY - ADDTL 15 MIN: Performed by: INTERNAL MEDICINE

## 2023-01-03 PROCEDURE — 87205 SMEAR GRAM STAIN: CPT

## 2023-01-03 PROCEDURE — C9113 INJ PANTOPRAZOLE SODIUM, VIA: HCPCS | Performed by: INTERNAL MEDICINE

## 2023-01-03 PROCEDURE — 83605 ASSAY OF LACTIC ACID: CPT

## 2023-01-03 RX ORDER — SODIUM CHLORIDE 9 MG/ML
INJECTION, SOLUTION INTRAVENOUS PRN
Status: CANCELLED | OUTPATIENT
Start: 2023-01-03

## 2023-01-03 RX ORDER — MAGNESIUM SULFATE IN WATER 40 MG/ML
2000 INJECTION, SOLUTION INTRAVENOUS ONCE
Status: COMPLETED | OUTPATIENT
Start: 2023-01-03 | End: 2023-01-03

## 2023-01-03 RX ORDER — SODIUM CHLORIDE 9 MG/ML
INJECTION, SOLUTION INTRAVENOUS CONTINUOUS PRN
Status: DISCONTINUED | OUTPATIENT
Start: 2023-01-03 | End: 2023-01-03 | Stop reason: SDUPTHER

## 2023-01-03 RX ORDER — PROPOFOL 10 MG/ML
INJECTION, EMULSION INTRAVENOUS PRN
Status: DISCONTINUED | OUTPATIENT
Start: 2023-01-03 | End: 2023-01-03 | Stop reason: SDUPTHER

## 2023-01-03 RX ORDER — THIAMINE HYDROCHLORIDE 100 MG/ML
100 INJECTION, SOLUTION INTRAMUSCULAR; INTRAVENOUS DAILY
Status: DISCONTINUED | OUTPATIENT
Start: 2023-01-03 | End: 2023-01-03

## 2023-01-03 RX ORDER — SODIUM CHLORIDE 0.9 % (FLUSH) 0.9 %
5-40 SYRINGE (ML) INJECTION EVERY 12 HOURS SCHEDULED
Status: CANCELLED | OUTPATIENT
Start: 2023-01-03

## 2023-01-03 RX ORDER — SODIUM CHLORIDE 0.9 % (FLUSH) 0.9 %
5-40 SYRINGE (ML) INJECTION PRN
Status: CANCELLED | OUTPATIENT
Start: 2023-01-03

## 2023-01-03 RX ORDER — LEVOFLOXACIN 500 MG/1
500 TABLET, FILM COATED ORAL DAILY
Status: DISPENSED | OUTPATIENT
Start: 2023-01-03 | End: 2023-01-08

## 2023-01-03 RX ADMIN — IPRATROPIUM BROMIDE AND ALBUTEROL SULFATE 1 AMPULE: .5; 3 SOLUTION RESPIRATORY (INHALATION) at 21:01

## 2023-01-03 RX ADMIN — BACLOFEN 10 MG: 10 TABLET ORAL at 08:20

## 2023-01-03 RX ADMIN — THIAMINE HYDROCHLORIDE 100 MG: 100 INJECTION, SOLUTION INTRAMUSCULAR; INTRAVENOUS at 08:26

## 2023-01-03 RX ADMIN — IPRATROPIUM BROMIDE AND ALBUTEROL SULFATE 1 AMPULE: .5; 3 SOLUTION RESPIRATORY (INHALATION) at 12:14

## 2023-01-03 RX ADMIN — POTASSIUM CHLORIDE 10 MEQ: 1500 TABLET, EXTENDED RELEASE ORAL at 08:21

## 2023-01-03 RX ADMIN — PROPOFOL 10 MG: 10 INJECTION, EMULSION INTRAVENOUS at 15:38

## 2023-01-03 RX ADMIN — PROPOFOL 30 MG: 10 INJECTION, EMULSION INTRAVENOUS at 15:36

## 2023-01-03 RX ADMIN — METRONIDAZOLE 500 MG: 500 INJECTION, SOLUTION INTRAVENOUS at 08:23

## 2023-01-03 RX ADMIN — CEFEPIME 2000 MG: 2 INJECTION, POWDER, FOR SOLUTION INTRAVENOUS at 08:14

## 2023-01-03 RX ADMIN — Medication 1250 MG: at 04:54

## 2023-01-03 RX ADMIN — IPRATROPIUM BROMIDE AND ALBUTEROL SULFATE 1 AMPULE: .5; 3 SOLUTION RESPIRATORY (INHALATION) at 08:44

## 2023-01-03 RX ADMIN — POTASSIUM CHLORIDE 40 MEQ: 1500 TABLET, EXTENDED RELEASE ORAL at 08:18

## 2023-01-03 RX ADMIN — IOPAMIDOL 75 ML: 755 INJECTION, SOLUTION INTRAVENOUS at 02:00

## 2023-01-03 RX ADMIN — MAGNESIUM SULFATE HEPTAHYDRATE 2000 MG: 40 INJECTION, SOLUTION INTRAVENOUS at 03:21

## 2023-01-03 RX ADMIN — SODIUM CHLORIDE, PRESERVATIVE FREE 40 MG: 5 INJECTION INTRAVENOUS at 11:24

## 2023-01-03 RX ADMIN — SODIUM CHLORIDE, PRESERVATIVE FREE 40 MG: 5 INJECTION INTRAVENOUS at 20:27

## 2023-01-03 RX ADMIN — POTASSIUM CHLORIDE 10 MEQ: 1500 TABLET, EXTENDED RELEASE ORAL at 20:27

## 2023-01-03 RX ADMIN — PHENYLEPHRINE HYDROCHLORIDE 100 MCG: 10 INJECTION INTRAVENOUS at 15:42

## 2023-01-03 RX ADMIN — POTASSIUM PHOSPHATE, MONOBASIC AND POTASSIUM PHOSPHATE, DIBASIC 30 MMOL: 224; 236 INJECTION, SOLUTION, CONCENTRATE INTRAVENOUS at 11:28

## 2023-01-03 RX ADMIN — SODIUM CHLORIDE, PRESERVATIVE FREE 10 ML: 5 INJECTION INTRAVENOUS at 20:27

## 2023-01-03 RX ADMIN — SODIUM CHLORIDE: 9 INJECTION, SOLUTION INTRAVENOUS at 15:25

## 2023-01-03 RX ADMIN — SODIUM CHLORIDE, PRESERVATIVE FREE 10 ML: 5 INJECTION INTRAVENOUS at 08:24

## 2023-01-03 ASSESSMENT — ENCOUNTER SYMPTOMS
SHORTNESS OF BREATH: 1
EYE DISCHARGE: 0
ABDOMINAL DISTENTION: 1
COLOR CHANGE: 0
APNEA: 0
ABDOMINAL PAIN: 0

## 2023-01-03 ASSESSMENT — PAIN - FUNCTIONAL ASSESSMENT: PAIN_FUNCTIONAL_ASSESSMENT: NONE - DENIES PAIN

## 2023-01-03 NOTE — OP NOTE
Operative Note      Patient: Nazario Najera  YOB: 1960  MRN: [id number]    Date of Procedure: 1/3/2023    Pre-Op Diagnosis: RECTAL BLEED    Post-Op Diagnosis:   Grade 1-2 varices in the distal esophagus without stigmata of bleeding  Few AVMs in the cardia  Thickened gastric folds in the fundus and cardia. No evidence of gastric varices  Mild diffuse portal hypertensive gastropathy  Normal duodenum         Procedure(s):  EGD ESOPHAGOGASTRODUODENOSCOPY    Surgeon(s):  Justina Carranza MD    Assistant:   First Assistant: Toshia Garcia RN    Anesthesia: Monitor Anesthesia Care    Estimated Blood Loss (mL): None    Complications: None    Specimens:   * No specimens in log *    Implants:  * No implants in log *      Drains:   Urinary Catheter 12/30/22 Neal-Temperature (Active)   Catheter Indications Urinary retention (acute or chronic), continuous bladder irrigation or bladder outlet obstruction 01/03/23 1200   Site Assessment No urethral drainage 01/03/23 1200   Urine Color Orange 01/03/23 1200   Urine Appearance Clear 01/03/23 1200   Urine Odor Malodorous 01/03/23 0000   Collection Container Standard 01/03/23 1200   Securement Method Leg strap 01/03/23 1200   Catheter Care Completed Yes 01/03/23 0800   Catheter Best Practices  Bag below bladder;Bag not on floor; Tamper seal intact;Drainage tube clipped to bed 01/03/23 1200   Status Draining 01/03/23 1200   Output (mL) 650 mL 01/03/23 0339       Findings:   2 columns of grade 1 to grade 2 varices were found in the distal esophagus without stigmata of bleeding  Few AVMs were found in the cardia and gastric fundus  Thickened gastric folds were found in the fundus. The previously noted gastric varices are obliterated. Mild diffuse portal hypertensive gastropathy. Normal examined duodenum. Recommendations  Continue pantoprazole 40 mg once daily. Nonselective beta-blocker.   Dose to be titrated to heart rate 55 to 60 bpm.  Okay to start 2 g low-sodium high-protein diet  Continue antibiotics for management of bacteremia and sepsis  Continue lactulose. Titrate to 2-3 soft bowel movements daily  PT OT  Alcohol cessation counseling  Dietary counseling for 2 g low-sodium diet      Detailed Description of Procedure:   Informed consent was obtained from the patient after explanation of the procedure including indications, description of the procedure,  benefits and possible risks and complications of the procedure, and alternatives. Questions were answered. The patient's history was reviewed and a directed physical examination was performed prior to the procedure. Patient was monitored throughout the procedure with pulse oximetry and periodic assessment of vital signs. Patient was sedated as noted above. With the patient in the left lateral decubitus position, the Olympus videoendoscope was placed in the patient's mouth and under direct visualization passed into the esophagus. Visualization of the esophagus, stomach, and duodenum was performed during both introduction and withdrawal of the endoscope and retroflexed view of the proximal stomach was obtained. The scope was passed to the 2nd portion of the duodenum. The patient tolerated the procedure well and was taken to the recovery area in good condition. The patient  was taken to the recovery area in good condition.       Electronically signed by Doug Odell MD on 1/3/2023 at 3:44 PM

## 2023-01-03 NOTE — CARE COORDINATION
Transitional Planning  Spoke with patient, plan to return home with [facility], has transportation, monitor for home oxygen needs. 1204 pm Call Pura at [address] [facility] [address], patient is current and able to accept back.

## 2023-01-03 NOTE — PLAN OF CARE
Problem: Discharge Planning  Goal: Discharge to home or other facility with appropriate resources  1/3/2023 0955 by Nicole Dimas RN  Outcome: Progressing  1/3/2023 0005 by Julissa Marroquin RN  Outcome: Progressing     Problem: Pain  Goal: Verbalizes/displays adequate comfort level or baseline comfort level  1/3/2023 0955 by Nicole Dimas RN  Outcome: Progressing  1/3/2023 0005 by Julissa Marroquin RN  Outcome: Progressing     Problem: Respiratory - Adult  Goal: Achieves optimal ventilation and oxygenation  1/3/2023 0955 by Nicole Dimas RN  Outcome: Progressing  1/3/2023 0005 by Julissa Marroquin RN  Outcome: Progressing     Problem: Cardiovascular - Adult  Goal: Maintains optimal cardiac output and hemodynamic stability  1/3/2023 0955 by Nicole Dimas RN  Outcome: Progressing  1/3/2023 0005 by Julissa Marroquin RN  Outcome: Progressing  Goal: Absence of cardiac dysrhythmias or at baseline  1/3/2023 0955 by Nicole Dimas RN  Outcome: Progressing  1/3/2023 0005 by Julissa Marroquin RN  Outcome: Progressing     Problem: Musculoskeletal - Adult  Goal: Return mobility to safest level of function  1/3/2023 0955 by Nicole Dimas RN  Outcome: Progressing  1/3/2023 0005 by Julissa Marroquin RN  Outcome: Progressing  Goal: Maintain proper alignment of affected body part  1/3/2023 0955 by Nicole Dimas RN  Outcome: Progressing  1/3/2023 0005 by Julissa Marroquin RN  Outcome: Progressing  Goal: Return ADL status to a safe level of function  1/3/2023 0955 by Nicole Dimas RN  Outcome: Progressing  1/3/2023 0005 by Julissa Marroquin RN  Outcome: Progressing     Problem: Gastrointestinal - Adult  Goal: Minimal or absence of nausea and vomiting  1/3/2023 0955 by Nicole Dimas RN  Outcome: Progressing  1/3/2023 0005 by Julissa Marroquin RN  Outcome: Progressing  Goal: Maintains or returns to baseline bowel function  1/3/2023 0955 by Nicole Dimas RN  Outcome: Progressing  1/3/2023 0005 by Julissa Marroquin RN  Outcome: Progressing     Problem: Hematologic - Adult  Goal: Maintains hematologic stability  1/3/2023 0955 by Av Thomas RN  Outcome: Progressing  1/3/2023 0005 by Mc Hameed RN  Outcome: Progressing     Problem: Skin/Tissue Integrity  Goal: Absence of new skin breakdown  Description: 1. Monitor for areas of redness and/or skin breakdown  2. Assess vascular access sites hourly  3. Every 4-6 hours minimum:  Change oxygen saturation probe site  4. Every 4-6 hours:  If on nasal continuous positive airway pressure, respiratory therapy assess nares and determine need for appliance change or resting period. 1/3/2023 0955 by Av Thomas RN  Outcome: Progressing  1/3/2023 0005 by Mc Hameed RN  Outcome: Progressing     Problem: Safety - Adult  Goal: Free from fall injury  1/3/2023 0955 by Av Thomas RN  Outcome: Progressing  1/3/2023 0005 by Mc Hameed RN  Outcome: Progressing  Flowsheets (Taken 1/2/2023 2000)  Free From Fall Injury: Instruct family/caregiver on patient safety     Problem: ABCDS Injury Assessment  Goal: Absence of physical injury  1/3/2023 0955 by Av Thomas RN  Outcome: Progressing  1/3/2023 0005 by Mc Hameed RN  Outcome: Progressing  Flowsheets (Taken 1/2/2023 2000)  Absence of Physical Injury: Implement safety measures based on patient assessment     Problem: Safety - Medical Restraint  Goal: Remains free of injury from restraints (Restraint for Interference with Medical Device)  Description: INTERVENTIONS:  1. Determine that other, less restrictive measures have been tried or would not be effective before applying the restraint  2. Evaluate the patient's condition at the time of restraint application  3. Inform patient/family regarding the reason for restraint  4.  Q2H: Monitor safety, psychosocial status, comfort, nutrition and hydration  1/3/2023 0955 by Av Thomas RN  Outcome: Progressing  1/3/2023 0005 by Mc Hameed RN  Outcome: Progressing

## 2023-01-03 NOTE — BRIEF OP NOTE
Brief Postoperative Note for Paracentesis    Faisal Ivy  YOB: 1960  [id number]    Pre-operative Diagnosis:  Ascites     Post-operative Diagnosis: Same    Procedure: Ultrasound guided Paracentesis     Anesthesia: 1% Lidocaine     Surgeons/Assistants: Jason Odom PA-C    Complications: none    EBL: Minimal    Specimens: Were obtained    Ultrasound guided paracentesis performed. 2200 ml clear yellow fluid obtained. Dressing applied.      Electronically signed by Tessie Cheadle, PA on 1/3/2023 at 10:23 AM

## 2023-01-03 NOTE — CARE COORDINATION
Consult received for consideration of rehab. Pt stated he lives alone in [place]. Stated he is a retired . Ins thru [employer]. Pt stated he was a daily drinker, 6  24oz beers/day. Stated he has not drank since the [employer] game (Thanksgiving weekend). Pt denied all drug use. Pt stated he is not concerned about his drinking. Pt stated he has quit before for over a year. Pt reports no prior tx or AA involvement. Stated he has always quit on his own. Pt stated he plans to stay sober. Discussed tx options, AA, etc and pt declined any tx resources. Pt to advise if he changes his mind.

## 2023-01-03 NOTE — PROGRESS NOTES
1950: Clarified fluid orders with on call NP. Pt with third spacing to BUE that is weeping. Orders to Hold bolus. 2130: On call NP notified of lactic acid of 2.5     2253: Pt with large bloody BM. On call NP and GI notified. See new orders.

## 2023-01-03 NOTE — ANESTHESIA POSTPROCEDURE EVALUATION
Department of Anesthesiology  Postprocedure Note    Patient: Carrie Arrington  MRN: [id number]  YOB: 1960  Date of evaluation: 1/3/2023      Procedure Summary     Date: 01/03/23 Room / Location: [address]    Anesthesia Start: 1494 Anesthesia Stop: 5044    Procedure: EGD ESOPHAGOGASTRODUODENOSCOPY Diagnosis:       Anemia, unspecified type      (ANEMIA)    Surgeons: Michaela Hopkins MD Responsible Provider: Naveen Lei MD    Anesthesia Type: general ASA Status: 3          Anesthesia Type: No value filed.     Yen Phase I: Yen Score: 9    Yen Phase II:        Anesthesia Post Evaluation    Patient location during evaluation: PACU  Patient participation: complete - patient participated  Level of consciousness: awake and alert  Pain score: 2  Airway patency: patent  Nausea & Vomiting: no nausea and no vomiting  Complications: no  Cardiovascular status: hemodynamically stable  Respiratory status: acceptable  Hydration status: euvolemic

## 2023-01-03 NOTE — PLAN OF CARE
Problem: Discharge Planning  Goal: Discharge to home or other facility with appropriate resources  1/3/2023 0005 by Richie Whalen RN  Outcome: Progressing  1/2/2023 1635 by Cuco Dorantes RN  Outcome: Progressing     Problem: Pain  Goal: Verbalizes/displays adequate comfort level or baseline comfort level  1/3/2023 0005 by Richie Whalen RN  Outcome: Progressing  1/2/2023 1635 by Cuco Dorantes RN  Outcome: Progressing     Problem: Respiratory - Adult  Goal: Achieves optimal ventilation and oxygenation  1/3/2023 0005 by Richie Whalen RN  Outcome: Progressing  1/2/2023 1635 by Cuco Dorantes RN  Outcome: Progressing     Problem: Cardiovascular - Adult  Goal: Maintains optimal cardiac output and hemodynamic stability  1/3/2023 0005 by Richie Whalen RN  Outcome: Progressing  1/2/2023 1635 by Cuco Dorantes RN  Outcome: Progressing  Goal: Absence of cardiac dysrhythmias or at baseline  1/3/2023 0005 by Richie Whalen RN  Outcome: Progressing  1/2/2023 1635 by Cuco Dorantes RN  Outcome: Progressing     Problem: Musculoskeletal - Adult  Goal: Return mobility to safest level of function  1/3/2023 0005 by Richie Whalen RN  Outcome: Progressing  1/2/2023 1635 by Cuco Dorantes RN  Outcome: Progressing  Goal: Maintain proper alignment of affected body part  1/3/2023 0005 by Richie Whalen RN  Outcome: Progressing  1/2/2023 1635 by Cuco Dorantes RN  Outcome: Progressing  Goal: Return ADL status to a safe level of function  1/3/2023 0005 by Richie Whalen RN  Outcome: Progressing  1/2/2023 1635 by Cuco Dorantes RN  Outcome: Progressing     Problem: Gastrointestinal - Adult  Goal: Minimal or absence of nausea and vomiting  1/3/2023 0005 by Richie Whalen RN  Outcome: Progressing  1/2/2023 1635 by Cuco Dorantes RN  Outcome: Progressing  Goal: Maintains or returns to baseline bowel function  1/3/2023 0005 by Richie Whalen RN  Outcome: Progressing  1/2/2023 1635 by Cuco Dorantes RN  Outcome: Progressing     Problem: Hematologic - Adult  Goal: Maintains hematologic stability  1/3/2023 0005 by Dom Rose RN  Outcome: Progressing  1/2/2023 1635 by Cony Mcneil RN  Outcome: Progressing     Problem: Skin/Tissue Integrity  Goal: Absence of new skin breakdown  Description: 1. Monitor for areas of redness and/or skin breakdown  2. Assess vascular access sites hourly  3. Every 4-6 hours minimum:  Change oxygen saturation probe site  4. Every 4-6 hours:  If on nasal continuous positive airway pressure, respiratory therapy assess nares and determine need for appliance change or resting period. 1/3/2023 0005 by Dom Rose RN  Outcome: Progressing  1/2/2023 1635 by Cony Mcneil RN  Outcome: Progressing     Problem: Safety - Adult  Goal: Free from fall injury  1/3/2023 0005 by Dom Rose RN  Outcome: Progressing  Flowsheets (Taken 1/2/2023 2000)  Free From Fall Injury: Instruct family/caregiver on patient safety  1/2/2023 1635 by Cony Mcneil RN  Outcome: Progressing     Problem: ABCDS Injury Assessment  Goal: Absence of physical injury  1/3/2023 0005 by Dom Rose RN  Outcome: Progressing  Flowsheets (Taken 1/2/2023 2000)  Absence of Physical Injury: Implement safety measures based on patient assessment  1/2/2023 1635 by Cony Mcneil RN  Outcome: Progressing     Problem: Safety - Medical Restraint  Goal: Remains free of injury from restraints (Restraint for Interference with Medical Device)  Description: INTERVENTIONS:  1. Determine that other, less restrictive measures have been tried or would not be effective before applying the restraint  2. Evaluate the patient's condition at the time of restraint application  3. Inform patient/family regarding the reason for restraint  4.  Q2H: Monitor safety, psychosocial status, comfort, nutrition and hydration  1/3/2023 0005 by Dom Rose RN  Outcome: Progressing  1/2/2023 1635 by Cony Mcneil RN  Outcome: Progressing

## 2023-01-03 NOTE — ANESTHESIA PRE PROCEDURE
Department of Anesthesiology  Preprocedure Note       Name:  Jojo Burr   Age:  60 y.o.  :  1960                                          MRN:  [id number]         Date:  1/3/2023      Surgeon: Edgardo Tinsley):  Hayder Tavares MD    Procedure: egd    Medications prior to admission:   Prior to Admission medications    Medication Sig Start Date End Date Taking? Authorizing Provider   pantoprazole (PROTONIX) 40 MG tablet Take 40 mg by mouth daily 10/4/22   Historical Provider, MD   FEROSBRAN 325 (65 Fe) MG tablet take 1 tablet by mouth twice a day 10/7/22   VASU Maldonado   nadolol (CORGARD) 20 MG tablet take 1 tablet by mouth once daily 22   MASSIMO Wells NP   midodrine (PROAMATINE) 5 MG tablet Take 2 tablets by mouth in the morning and 2 tablets at noon and 2 tablets before bedtime. Patient not taking: No sig reported 22   MASSIMO Wlels NP   furosemide (LASIX) 20 MG tablet Take 1 tablet by mouth 2 times daily 22   MASSIMO Wells NP   spironolactone (ALDACTONE) 100 MG tablet Take 1 tablet by mouth every evening 22   MASSIMO Wells NP   ondansetron (ZOFRAN-ODT) 4 MG disintegrating tablet dissolve 1 tablet by mouth every 8 hours if needed for nausea and vomiting  Patient not taking: No sig reported 22   VASU Maldonado   lactulose [facility]) 10 GM/15ML solution take 30 milliliters by mouth three times a day 22   VASU Maldonado   FLUoxetine (PROZAC) 20 MG capsule Take 1 capsule by mouth daily 22   Kinza Sky MD   atorvastatin (LIPITOR) 20 MG tablet Take 1 tablet by mouth nightly 22   Kinza Sky MD       Current medications:    No current facility-administered medications for this visit. No current outpatient medications on file.      Facility-Administered Medications Ordered in Other Visits   Medication Dose Route Frequency Provider Last Rate Last Admin    potassium phosphate 30 mmol in dextrose 5 % 250 mL IVPB  30 mmol IntraVENous Once Rosalene Vic, DO 62.5 mL/hr at 01/03/23 1128 30 mmol at 01/03/23 1128    [MAR Hold] pantoprazole (PROTONIX) 40 mg in sodium chloride (PF) 0.9 % 10 mL injection  40 mg IntraVENous Q12H Navya Calvillo, DO   40 mg at 01/03/23 1124    [MAR Hold] levoFLOXacin (LEVAQUIN) tablet 500 mg  500 mg Oral Daily MD Blanca Bullard Aaliyah Hold] potassium chloride (KLOR-CON M) extended release tablet 40 mEq  40 mEq Oral PREROS Ellison MD   40 mEq at 01/03/23 0818    Or    [MAR Hold] potassium bicarb-citric acid (EFFER-K) effervescent tablet 40 mEq  40 mEq Oral PREROS Ellison MD        Or   Darren Curtising Hold] potassium chloride 10 mEq/100 mL IVPB (Peripheral Line)  10 mEq IntraVENous PRMD Darren Shaffer Aaliyah Hold] magnesium sulfate 2000 mg in 50 mL IVPB premix  2,000 mg IntraVENous PRN MD Darren Elizabething Hold] sodium phosphate 10 mmol in sodium chloride 0.9 % 250 mL IVPB  10 mmol IntraVENous PREROS Ellison MD        Or   Darren Curtising Hold] sodium phosphate 15 mmol in sodium chloride 0.9 % 250 mL IVPB  15 mmol IntraVENous PRN Deshaun Ellison MD        Or   Darren Curtising Hold] sodium phosphate 20 mmol in sodium chloride 0.9 % 500 mL IVPB  20 mmol IntraVENous PRMD Darren Shaffering Hold] glucose chewable tablet 16 g  4 tablet Oral PRMD Blanca Shaffer Hold] dextrose bolus 10% 125 mL  125 mL IntraVENous PREROS Ellison MD        Or   Darren Curtising Hold] dextrose bolus 10% 250 mL  250 mL IntraVENous PRN MD Blanca Elizabeth Hold] glucagon (rDNA) injection 1 mg  1 mg SubCUTAneous PRMD Blanca Sahffer Hold] dextrose 10 % infusion   IntraVENous Continuous PRMD Blanca Shaffering Hold] insulin lispro (HUMALOG) injection vial 4 Units  0.05 Units/kg SubCUTAneous TID MANISHA Ellison MD   4 Units at 01/01/23 1730    [MAR Hold] potassium chloride [employer]) extended release tablet 10 mEq  10 mEq Oral BID Isaiah Maciel MD   10 mEq at 01/03/23 0821    [MAR Hold] glucose chewable tablet 16 g  4 tablet Oral PRN MD Sonny Howe [facility] Hold] dextrose bolus 10% 125 mL  125 mL IntraVENous PRN Isaiah Maciel MD        Or   [facility]ny [facility] Hold] dextrose bolus 10% 250 mL  250 mL IntraVENous PRN MD Sonny Howe [facility] Hold] glucagon (rDNA) injection 1 mg  1 mg SubCUTAneous PRN MD Sonny Howe [facility] Hold] dextrose 10 % infusion   IntraVENous Continuous PRN MD Sonny Howe [facility] Hold] insulin lispro (HUMALOG) injection vial 0-4 Units  0-4 Units SubCUTAneous TID WC Isaiah Maciel MD        [facility] Hold] insulin lispro (HUMALOG) injection vial 0-4 Units  0-4 Units SubCUTAneous Nightly Isaiah Maciel MD        [Held by provider] lactulose (CHRONULAC) 10 GM/15ML solution 20 g  20 g Oral 6 times per day MASSIMO Aguayo - CNP   20 g at 01/02/23 2008    [MAR Hold] ipratropium-albuterol (DUONEB) nebulizer solution 1 ampule  1 ampule Inhalation 4x daily Isaiah Maciel MD   1 ampule at 01/03/23 1214    [MAR Hold] sodium chloride flush 0.9 % injection 5-40 mL  5-40 mL IntraVENous 2 times per day Jonetta Oppenheim, MD   10 mL at 12/31/22 0901    [MAR Hold] sodium chloride flush 0.9 % injection 5-40 mL  5-40 mL IntraVENous PRN Jonetta Oppenheim, MD        [facility] Hold] 0.9 % sodium chloride infusion   IntraVENous PRN Jonetta Oppenheim, MD        [facility] Hold] OLANZapine [facility]) 2.5 mg in sterile water 0.5 mL injection  2.5 mg IntraMUSCular BID PRN Rosio Carvalho MD   2.5 mg at 12/30/22 0141    [MAR Hold] sodium chloride flush 0.9 % injection 5-40 mL  5-40 mL IntraVENous 2 times per day Rosio Carvalho MD   10 mL at 01/02/23 2009    [MAR Hold] sodium chloride flush 0.9 % injection 5-40 mL  5-40 mL IntraVENous PRN Rosio Carvalho MD        [facility] Hold] 0.9 % sodium chloride infusion   IntraVENous PRN Yulissa Cancino MD        [Held by provider] baclofen (LIORESAL) tablet 10 mg  10 mg Oral TID Jaylyn De Jesus MD   10 mg at 01/03/23 0820    [MAR Hold] sodium chloride flush 0.9 % injection 5-40 mL  5-40 mL IntraVENous 2 times per day Anita Armstrong MD   10 mL at 01/03/23 0824    [MAR Hold] sodium chloride flush 0.9 % injection 5-40 mL  5-40 mL IntraVENous PRN Anita Armstrong MD        [facility] Hold] 0.9 % sodium chloride infusion   IntraVENous PRN Anita Armstrong MD       [facility] Hold] sodium chloride flush 0.9 % injection 5-40 mL  5-40 mL IntraVENous 2 times per day Yusra Fulton MD   10 mL at 12/31/22 0901    [MAR Hold] sodium chloride flush 0.9 % injection 5-40 mL  5-40 mL IntraVENous PRN Yusra Fulton MD       [facility] Hold] ondansetron (ZOFRAN-ODT) disintegrating tablet 4 mg  4 mg Oral Q8H PRN Yusra Fulton MD   4 mg at 12/29/22 0016    Or    [MAR Hold] ondansetron (ZOFRAN) injection 4 mg  4 mg IntraVENous Q6H PRN Yusra Fulton MD           Allergies:  No Known Allergies    Problem List:    Patient Active Problem List   Diagnosis Code    Back pain, chronic M54.9, G89.29    Hearing difficulty H91.90    GERD (gastroesophageal reflux disease) K21.9    Cervical radicular pain M54.12    Acute hyperactive alcohol withdrawal delirium (HCC) F10.931    Hypomagnesemia E83.42    Chronic viral hepatitis B without delta agent and without coma (HCC) B18.1    Calculus of gallbladder without cholecystitis K80.20    Hep C w/o coma, chronic (HCC) B18.2    Fatty liver K76.0    Psychophysiologic insomnia F51.04    Cirrhosis (HCC) K74.60    Decompensation of cirrhosis of liver (HCC) K72.90, K74.60    Vertebrogenic low back pain M54.51    DDD (degenerative disc disease), lumbar M51.36    Depression F32. A    Tubular adenoma of colon D12.6    History of colon polyps Z86.010    Gynecomastia, male N58    Lumbar radiculitis M54.16    Lumbar disc herniation M51.26    Tinnitus H93.19    Eustachian tube dysfunction H69.80    Ganglion cyst M67.40    Carpal tunnel syndrome of right wrist G56.01    History of hepatitis C Z86.19    Vitamin D deficiency E55.9    Pure hypercholesterolemia E78.00    Hypokalemia E87.6    Essential hypertension I10    Recurrent major depressive disorder in partial remission (HCC) F33.41    S/P epidural steroid injection Z92.241    Elevated LFTs R79.89    Seasonal allergies J30.2    Lumbar facet arthropathy M47.816    Cervical facet syndrome M47.812    Acute upper GI hemorrhage K92.2    Thrombocytopenia (HCC) D69.6    Hepatitis C virus infection resolved after antiviral drug therapy Z86.19    Gastrointestinal hemorrhage with melena K92.1    Alcohol abuse F10.10    Altered mental status R41.82    Hypocalcemia E83.51    Hypophosphatemia E83.39    Malignant neoplasm of lower third of esophagus (HCC) C15.5    COVID-19 U07.1    Anxiety F41.9    Current smoker F17.200    Severe comorbid illness R69    Gait instability R26.81    Abnormal findings on diagnostic imaging of spine R93.7    Cervical spinal stenosis M48.02    Spinal stenosis of lumbar region with neurogenic claudication M48.062    Low hemoglobin D64.9    Symptomatic anemia, microcytic, acute D64.9    Hypotension I95.9    Former smoker, 50+ pack years, quit 2016 Z87.891    HLD (hyperlipidemia) E78.5    Esophageal adenocarcinoma (HCC) C15.9    Anemia D64.9    Acute kidney injury ([facility] N17.9    Ascites due to alcoholic cirrhosis (HCC) O83.47    Shortness of breath R06.02    Drop in hemoglobin R71.0    Portal hypertension (HCC) K76.6    Esophageal varices with bleeding (HCC) I85.01    Esophageal polyp K22.81    Acute kidney failure, unspecified (HCC) N17.9    Muscle weakness (generalized) M62.81    Other abnormalities of gait and mobility R26.89    GI bleed K92.2    Goals of care, counseling/discussion Z71.89    ACP (advance care planning) Z71.89    Palliative care encounter Z51.5    S/P TIPS (transjugular intrahepatic portosystemic shunt) Z95.828    Acute blood loss anemia D62    Near syncope R55    Bleeding gastric varices I86.4    Hepatic encephalopathy D10.38    Periumbilical abdominal pain R10.33    Lezama's esophagus with dysplasia K22.719    Mastoid disorder, bilateral H74.93    Encephalopathy acute G93.40       Past Medical History:        Diagnosis Date    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 ([address]     Alcoholic cirrhosis of liver with ascites ([address]     Anemia 04/13/2022    Anxiety     Arthritis     Back pain, chronic     dr. Bethany Nevarez, orthopedic, every 3-4 months, gets steroid injection    Lezama esophagus     BPH (benign prostatic hypertrophy)     Cholelithiasis     Cirrhosis ([address]     COVID-19 12/2020    pt reports he had a positive test while at [facility] in 2020, was asymptomatic    COVID-19 vaccine series completed 5/20/2021, 6/22/2021    Moderna 5/20/2021, 6/22/2021    DDD (degenerative disc disease), lumbar     Depression     Esophageal cancer ([address]     INVASIVE ADENOCARCINOMA ARISING IN TUBULAR ADENOMA WITH HIGH GRADE DYSPLASIA, ASSOCIATED WITH FOCAL INTESTINAL METAPLASIA     Esophageal varices ([address]     Fatty liver     GERD (gastroesophageal reflux disease)     GI bleed     Hep C w/o coma, chronic ([address]     History of alcohol abuse     6-12 beers a day; quit drinking 2019    History of blood transfusion     History of colon polyps 2016    History of tobacco abuse     [place]     Hyperlipidemia     Hypertension     Hyponatremia 07/20/2016    Hypotension 12/20/2021    Mastoid disorder, bilateral 12/31/2022    biLateral mastoid effusion    PONV (postoperative nausea and vomiting)     Port-A-Cath in place     right upper chest    Portal hypertension (HCC)     Sciatica     Secondary esophageal varices ([address] 06/07/2022    Shortness of breath     Spinal stenosis     Stomach ulcer     hx of    Thrombocytopenia ([address] 12/23/2020    Tubular adenoma of colon 2016, 2018    Vitamin D deficiency     Wears glasses        Past Surgical History:        Procedure Laterality Date    BUNIONECTOMY      twice on right side    BUNIONECTOMY Left     CARPAL TUNNEL RELEASE Right     COLONOSCOPY      at age 35    COLONOSCOPY  10/05/2016    polyps-pathology tubular adenoma, and abnormal looking mucosa right colon-pathology-tubular adenoma    COLONOSCOPY N/A 03/30/2018    COLONOSCOPY POLYPECTOMY COLD BIOPSY performed by Mildred Moreno MD at [address] [place] [address]  03/30/2018    Small polyp in the sigmoid colon and excised with biopsy forceps--tubular adenoma    COLONOSCOPY N/A 04/16/2022    COLONOSCOPY POLYPECTOMY performed by Mildred Moreno MD at [facility], COLON, DIAGNOSTIC      EGD    ESOPHAGOGASTRODUODENOSCOPY  12/29/2022    IR PORT PLACEMENT EQUAL OR GREATER THAN 5 YEARS  04/19/2021    IR PORT PLACEMENT EQUAL OR GREATER THAN 5 YEARS 4/19/2021 [facility]    IR TIPS INSERTION  12/01/2022    IR TIPS INSERTION 12/1/2022 Claude Cheung MD [facility]    KNEE SURGERY Left     cyst removed    NASAL SEPTUM SURGERY      NERVE BLOCK Right 11/23/2020    NERVE BLOCK RIGHT CERVICAL STEROID INJECTION  C3-C6 performed by Padmini Ga MD at [facility]  01/04/2016    steroid injection C7 T1    OTHER SURGICAL HISTORY  11/21/2016    Bilateral Lumbar CACHORRO L4-L5 injections    OTHER SURGICAL HISTORY  12/19/2016    lumbar steroid injection    OTHER SURGICAL HISTORY  09/28/2018    BILATERAL L5 CACHORRO (N/A Back)    OTHER SURGICAL HISTORY Right 11/23/2020    cervical injection    PAIN MANAGEMENT PROCEDURE Left 07/09/2020    EPIDURAL STEROID INJECTION LEFT L4 L5 performed by Padmini Ga MD at [address] [place] [address] Left 07/20/2020    LEFT L4 L5 EPIDURAL STEROID INJECTION performed by Padmini Ga MD at [address] [place] [address] Bilateral 08/17/2020 LUMBAR FACET BILATERAL L2-L5 performed by Sandra Carpenter MD at [address] Bilateral 12/07/2020    NERVE BLOCK BILATERAL LUMBAR MEDIAL BRANCH L2-L5 performed by Sandra Carpenter MD at . [address]      Multiple    [place] [address] AA&/STRD TFRML EPI LUMBAR/SACRAL 1 LEVEL Bilateral 09/06/2018    BILATERAL L5 CACHORRO performed by Sandra Carpenter MD at [address] AA&/STRD TFRML EPI LUMBAR/SACRAL 1 LEVEL N/A 09/28/2018    BILATERAL L5 CACHORRO performed by Sandra Carpenter MD at [address] [facility] N/CARPAL TUNNEL SURG Right 08/29/2017    CARPAL TUNNEL RELEASE RIGHT performed by Tiffany Glover MD at [address] [facility] N/CARPAL TUNNEL SURG Left 10/31/2017    CARPAL TUNNEL RELEASE performed by Tiffany Glover MD at [place] [address] 12/29/2020    EGD BIOPSY performed by Adrianne Carlson MD at [place] [address] 02/02/2021    EGD BIOPSY and spot marking performed by Beltran Causey MD at [place] [address] 02/12/2021    ENDOSCOPIC ULTRASOUND, EGD performed by India Belcher MD at [place] [address]  02/12/2021    EGD DIAGNOSTIC ONLY performed by India Belcher MD at [facility] N/A 08/31/2021    EGD BIOPSY performed by Beltran Causey MD at [place] [address] 01/21/2022    EGD BIOPSY performed by Beltran Causey MD at [place] [address] 04/15/2022    EGD ESOPHAGOGASTRODUODENOSCOPY performed by Adrianne Carlson MD at [place] [address] 06/06/2022    EGD BAND LIGATION performed by John Yin MD at [place] [address] N/A 06/09/2022    EGD ESOPHAGOGASTRODUODENOSCOPY performed by John Yin MD at [place] [address] N/A 09/14/2022    EGD ESOPHAGOGASTRODUODENOSCOPY ENDOSCOPIC APC AT GE JUNCTION performed by Mathew Lezama MD at [address] [place] [address] 10/19/2022    EGD BIOPSY performed by Munira Kay MD at [address] [place] [address] N/A 10/31/2022    EGD with APC performed by Munira Kay MD at [address] [place] [address]  2022    EGD ESOPHAGOGASTRODUODENOSCOPY performed by Echo Marrero MD at [place] [address] History:    Social History     Tobacco Use    Smoking status: Former     Packs/day: 1.00     Years: 45.00     Pack years: 45.00     Types: Cigarettes     Quit date: 2017     Years since quittin.9    Smokeless tobacco: Never   Substance Use Topics    Alcohol use: Not Currently     Comment: Quit alcohol in 2019- heavier drinking prior to quitting                                Counseling given: Not Answered      Vital Signs (Current): There were no vitals filed for this visit.                                            BP Readings from Last 3 Encounters:   23 (!) 98/55   22 118/65   22 119/68       NPO Status:                                                                                 BMI:   Wt Readings from Last 3 Encounters:   23 192 lb 12.5 oz (87.4 kg)   22 197 lb 5 oz (89.5 kg)   22 214 lb 15.2 oz (97.5 kg)     There is no height or weight on file to calculate BMI.    CBC:   Lab Results   Component Value Date/Time    WBC 4.8 2023 05:57 AM    RBC 2.84 2023 05:57 AM    RBC 4.75 2012 11:30 AM    HGB 7.2 2023 05:57 AM    HCT 24.9 2023 05:57 AM    MCV 87.7 2023 05:57 AM    RDW 20.7 2023 05:57 AM    PLT 70 2023 05:57 AM     2012 11:30 AM       CMP:   Lab Results   Component Value Date/Time     2023 05:57 AM    K 3.3 2023 05:57 AM     2023 05:57 AM    CO2 21 2023 05:57 AM BUN 4 01/03/2023 05:57 AM    CREATININE 0.40 01/03/2023 05:57 AM    GFRAA >60 09/28/2022 01:33 PM    LABGLOM >60 01/03/2023 05:57 AM    GLUCOSE 102 01/03/2023 05:57 AM    GLUCOSE 65 04/19/2012 11:30 AM    PROT 4.0 01/03/2023 05:57 AM    CALCIUM 7.5 01/03/2023 05:57 AM    BILITOT 5.0 01/03/2023 05:57 AM    ALKPHOS 95 01/03/2023 05:57 AM     01/03/2023 05:57 AM    ALT 30 01/03/2023 05:57 AM       POC Tests:   Recent Labs     01/03/23  1214   POCGLU 96       Coags:   Lab Results   Component Value Date/Time    PROTIME 16.0 01/03/2023 05:57 AM    INR 1.6 01/03/2023 05:57 AM    APTT 35.9 01/02/2023 10:55 AM       HCG (If Applicable): No results found for: PREGTESTUR, PREGSERUM, HCG, HCGQUANT     ABGs: No results found for: PHART, PO2ART, HCD2YJA, MDY1AMO, BEART, O9VBQMXH     Type & Screen (If Applicable):  No results found for: LABABO, LABRH    Drug/Infectious Status (If Applicable):  Lab Results   Component Value Date/Time    HEPCAB REACTIVE 12/23/2020 05:09 PM       COVID-19 Screening (If Applicable):   Lab Results   Component Value Date/Time    COVID19 Not Detected 09/12/2022 10:32 PM    COVID19 Not Detected 07/17/2020 10:00 AM       TTE 12/2021  Left ventricle is normal in size. Mild left ventricular hypertrophy. Global left ventricular systolic function is normal. Estimated LV EF 60-65  %. Left atrium is mildly dilated. No significant valvular regurgitation or stenosis seen. No significant pericardial effusion is seen. Normal aortic root dimension.       Anesthesia Evaluation  Patient summary reviewed no history of anesthetic complications:   Airway: Mallampati: II  TM distance: >3 FB   Neck ROM: full  Mouth opening: > = 3 FB   Dental:          Pulmonary:normal exam    (+) shortness of breath: no interval change,                             Cardiovascular:    (+) hypertension: no interval change,       ECG reviewed  Rhythm: regular  Rate: normal                    Neuro/Psych:   (+) neuromuscular disease:, psychiatric history:depression/anxiety             GI/Hepatic/Renal:   (+) GERD: no interval change, PUD, hepatitis: C, liver disease: portal hypertension,           Endo/Other: Negative Endo/Other ROS   (+) blood dyscrasia: anemia:., .                 Abdominal:             Vascular: negative vascular ROS. Other Findings:             Anesthesia Plan      general     ASA 3       Induction: intravenous. Anesthetic plan and risks discussed with patient. Plan discussed with CRNA.                     Yao Cornejo MD   1/3/2023

## 2023-01-03 NOTE — CONSULTS
[facility] -   Infectious diseases evaluation  admission date 12/28/2022    reason for consultation:   Possible SBP    Impression :   Current:  Bilateral pleural effusions   bilateral atelectasis, vs pneumonia   small pericardial effusion  Cirrhosis post TIPS, with ascites and esophageal varices -treated hepatitis C  Elevated CRP  1/3 rectal bleed - EGD    Other:  History of esophageal cancer/Lezama's esophagus  Discussion / summary of stay / plan of care     Recommendations   Treating bilateral lower lobe consolidations possibly pneumonia, switch antibiotics to Levaquin since QTc interval is good  Patient received cefepime / vanco since 1/1, switch to Levaquin 1/3 for 5 more days until 1/7  Due to continuous rectal bleed, going for EGD today 1/3  Disc w medicine    Infection Control Recommendations   [place]ard Precautions  Contact Isolation     Antimicrobial Stewardship Recommendations   Simplification of therapy  Targeted therapy      History of Present Illness:   Initial history:  Sandy Alfaro is a 60y.o.-year-old male presented to the hospital 12/29 because of weakness fatigue recurrent upper GI bleed with melena and what seems to be anemia at the time. Admitted for GI work-up, he was having bloody stools/melena. [facility] for reevaluation of the hips    CT abdomen showed rectal spasm otherwise with fluid in the rectum. TIPS was apparently patent, enthesitis    Ascites sample 12/28 was culture negative, And the number of WBCs was not suggestive of infection. A repeat paracentesis was performed 1/3, cultures and studies pending    CT abdomen and pelvis suggested a possible bilateral lower lobe atelectasis, upon review they could potentially be pneumonia.   Infectious disease consulted to manage antibiotics, the patient has been started on broad antibiotics on 1/1    Interval changes 1/3/2023   Patient Vitals for the past 8 hrs:   BP Temp Temp src Pulse Resp SpO2   01/03/23 1121 105/67 -- -- 100 21 --   01/03/23 1112 (!) 97/54 98 °F (36.7 °C) Temporal 90 20 95 %   01/03/23 1013 106/63 -- -- -- -- --   01/03/23 1005 112/61 -- -- -- -- --   01/03/23 0705 106/62 97.8 °F (36.6 °C) Axillary 92 22 94 %     Paracenthesis 1/3 2200ml yellow clear fluid  Studies pend    Summary of relevant labs:  Labs:  Procalcitonin0.15 High    CRP31.8 High    ALT30U/L AWG427 High    Creatinine0.40 Low    WBC4.8   Micro:  Ascites  cx neg 12/28  Imaging:  CXR 1/3/23  Increasing vascular congestion with small pleural effusions, suggestive of   developing edema. Echo  Left ventricle is normal in size. Global left ventricular systolic function   is normal. Calculated ejection fraction 60% by Heart Model. No significant valvular regurgitation or stenosis seen. Moderate circumferential pericardial effusion seen. Measures 2.2cm   anteriorly and 2.26cm posteriorly. Clot-like structure noted in anterior pericardial space. No signs of tamponade. Consider clinical correlation. CTAP  1/3/23  There is a short segment of narrowing of the rectum near the rectosigmoid   junction compared to the recent exam and may represent spasm rather than   underlying mass. Fluid-filled loops of small bowel and colon suggesting   diarrhea with enteritis or colitis. Evaluation of true wall thickening is   limited by the diffuse edematous state. 2.  Cirrhotic liver with tips, ascites, and sequela of portal hypertension. 3.  Thickening of the gastric mucosal folds and into the distal esophagus. Correlate with prior history of malignancy. 4.  Calcified thrombosed splenic artery aneurysm in the left upper quadrant   measuring up to 3.1 cm but stable from previous exams. 5.  Pleural effusions and pericardial effusion are redemonstrated.   The   opacified portions of the lower lungs appears to be from compressive atelectasis. I have personally reviewed the past medical history, past surgical history, medications, social history, and family history, and I haveupdated the database accordingly. Allergies:   Patient has no known allergies. Review of Systems:     Review of Systems   Constitutional:  Positive for activity change. HENT:  Negative for congestion. Eyes:  Negative for discharge. Respiratory:  Negative for apnea. Cardiovascular:  Positive for leg swelling. Negative for chest pain. Gastrointestinal:  Positive for abdominal distention. Negative for abdominal pain. Endocrine: Negative for cold intolerance. Genitourinary:  Negative for dysuria. Musculoskeletal:  Negative for arthralgias. Skin:  Negative for color change. Allergic/Immunologic: Negative for immunocompromised state. Neurological:  Negative for dizziness. Hematological:  Negative for adenopathy. Psychiatric/Behavioral:  Negative for agitation. Physical Examination :       Physical Exam  Constitutional:       Appearance: Normal appearance. He is not ill-appearing. HENT:      Head: Normocephalic and atraumatic. Nose: Nose normal. No congestion. Mouth/Throat:      Mouth: Mucous membranes are moist.      Pharynx: No oropharyngeal exudate. Eyes:      General: No scleral icterus. Conjunctiva/sclera: Conjunctivae normal.   Cardiovascular:      Rate and Rhythm: Normal rate and regular rhythm. Heart sounds: Normal heart sounds. No murmur heard. Pulmonary:      Breath sounds: No stridor. No rhonchi. Abdominal:      General: There is distension. Palpations: There is no mass. Tenderness: There is no abdominal tenderness. Hernia: No hernia is present. Genitourinary:     Comments: Urine dark yellow  Musculoskeletal:         General: No swelling or deformity. Cervical back: Neck supple. No rigidity. Skin:     Coloration: Skin is not jaundiced. Findings: No bruising. Neurological:      Mental Status: He is alert. Cranial Nerves: No cranial nerve deficit. Psychiatric:         Mood and Affect: Mood normal.         Thought Content:  Thought content normal.       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 Sam Carpenter, orthopedic, every 3-4 months, gets steroid injection    Lezama esophagus     BPH (benign prostatic hypertrophy)     Cholelithiasis     Cirrhosis ([address]     COVID-19 12/2020    pt reports he had a positive test while at [facility] in 2020, was asymptomatic    COVID-19 vaccine series completed 5/20/2021, 6/22/2021    Moderna 5/20/2021, 6/22/2021    DDD (degenerative disc disease), lumbar     Depression     Esophageal cancer ([address]     INVASIVE ADENOCARCINOMA ARISING IN TUBULAR ADENOMA WITH HIGH GRADE DYSPLASIA, ASSOCIATED WITH FOCAL INTESTINAL METAPLASIA     Esophageal varices ([address]     Fatty liver     GERD (gastroesophageal reflux disease)     GI bleed     Hep C w/o coma, chronic ([address]     History of alcohol abuse     6-12 beers a day; quit drinking 2019    History of blood transfusion     History of colon polyps 2016    History of tobacco abuse     [place] (hard of hearing)     Hyperlipidemia     Hypertension     Hyponatremia 07/20/2016    Hypotension 12/20/2021    Mastoid disorder, bilateral 12/31/2022    biLateral mastoid effusion    PONV (postoperative nausea and vomiting)     Port-A-Cath in place     right upper chest    Portal hypertension ([address]     Sciatica     Secondary esophageal varices ([address] 06/07/2022    Shortness of breath     Spinal stenosis     Stomach ulcer     hx of    Thrombocytopenia ([address] 12/23/2020    Tubular adenoma of colon 2016, 2018    Vitamin D deficiency     Wears glasses        Past Surgical  History:     Past Surgical History:   Procedure Laterality Date BUNIONECTOMY      twice on right side    BUNIONECTOMY Left     CARPAL TUNNEL RELEASE Right     COLONOSCOPY      at age 35    COLONOSCOPY  10/05/2016    polyps-pathology tubular adenoma, and abnormal looking mucosa right colon-pathology-tubular adenoma    COLONOSCOPY N/A 03/30/2018    COLONOSCOPY POLYPECTOMY COLD BIOPSY performed by Luis Cisneros MD at [address] [place] [address]  03/30/2018    Small polyp in the sigmoid colon and excised with biopsy forceps--tubular adenoma    COLONOSCOPY N/A 04/16/2022    COLONOSCOPY POLYPECTOMY performed by Luis Cisneros MD at [address], DIAGNOSTIC      EGD    ESOPHAGOGASTRODUODENOSCOPY  12/29/2022    IR PORT PLACEMENT EQUAL OR GREATER THAN 5 YEARS  04/19/2021    IR PORT PLACEMENT EQUAL OR GREATER THAN 5 YEARS 4/19/2021 [facility]    IR TIPS INSERTION  12/01/2022    IR TIPS INSERTION 12/1/2022 Sanaz Rankin MD ST [facility]    KNEE SURGERY Left     cyst removed    NASAL SEPTUM SURGERY      NERVE BLOCK Right 11/23/2020    NERVE BLOCK RIGHT CERVICAL STEROID INJECTION  C3-C6 performed by Danica Fung MD at [address] [place] [address]  01/04/2016    steroid injection C7 T1    OTHER SURGICAL HISTORY  11/21/2016    Bilateral Lumbar CACHORRO L4-L5 injections    OTHER SURGICAL HISTORY  12/19/2016    lumbar steroid injection    OTHER SURGICAL HISTORY  09/28/2018    BILATERAL L5 CACHORRO (N/A Back)    OTHER SURGICAL HISTORY Right 11/23/2020    cervical injection    PAIN MANAGEMENT PROCEDURE Left 07/09/2020    EPIDURAL STEROID INJECTION LEFT L4 L5 performed by Danica Fung MD at [facility] Left 07/20/2020    LEFT L4 L5 EPIDURAL STEROID INJECTION performed by Danica Fung MD at [facility] Bilateral 08/17/2020    LUMBAR FACET BILATERAL L2-L5 performed by Danica Fung MD at [facility] Bilateral 12/07/2020    NERVE BLOCK BILATERAL LUMBAR MEDIAL BRANCH L2-L5 performed by Alexy Lentz MD at [facility] AA&/STRD TFRML EPI LUMBAR/SACRAL 1 LEVEL Bilateral 09/06/2018    BILATERAL L5 CACHORRO performed by [facility] MD at [facility] AA&/STRD TFRML EPI LUMBAR/SACRAL 1 LEVEL N/A 09/28/2018    BILATERAL L5 CACHORRO performed by Alexy Lentz MD at [address] [place] [address] N/CARPAL TUNNEL SURG Right 08/29/2017    CARPAL TUNNEL RELEASE RIGHT performed by Antonietta Graf MD at [address] [place] [address] N/CARPAL TUNNEL SURG Left 10/31/2017    CARPAL TUNNEL RELEASE performed by Antonietta Graf MD at [place] [address]. 12/29/2020    EGD BIOPSY performed by Devin Trejo MD at [place] [address]. 02/02/2021    EGD BIOPSY and spot marking performed by Tomás Ojeda MD at [place] [address]. N/A 02/12/2021    ENDOSCOPIC ULTRASOUND, EGD performed by Wesley Jackson MD at [place] [address]  02/12/2021    EGD DIAGNOSTIC ONLY performed by Wesley Jackson MD at [place] [address] 08/31/2021    EGD BIOPSY performed by Tomás Ojeda MD at [place] [address]. 01/21/2022    EGD BIOPSY performed by Tomás Ojeda MD at [place] [address]. N/A 04/15/2022    EGD ESOPHAGOGASTRODUODENOSCOPY performed by Devin Trejo MD at [place] [address]. 06/06/2022    EGD BAND LIGATION performed by Loreta Verduzco MD at [place] [address]. N/A 06/09/2022    EGD ESOPHAGOGASTRODUODENOSCOPY performed by Loreta Verduzco MD at [place] [address]. N/A 09/14/2022    EGD ESOPHAGOGASTRODUODENOSCOPY ENDOSCOPIC APC AT GE JUNCTION performed by Quique Ortiz MD at [place] [address]. 10/19/2022    EGD BIOPSY performed by Tomás Ojeda MD at [address] [facility] N/A 10/31/2022    EGD with APC performed by Luis Cisneros MD at [facility]  2022    EGD ESOPHAGOGASTRODUODENOSCOPY performed by Colletta Couch, MD at [address] [place] [address]         Medications:      potassium phosphate IVPB  30 mmol IntraVENous Once    pantoprazole (PROTONIX) 40 mg injection  40 mg IntraVENous Q12H    metroNIDAZOLE  500 mg IntraVENous Q8H    cefepime  2,000 mg IntraVENous Q8H    insulin lispro  0.05 Units/kg SubCUTAneous TID WC    potassium chloride  10 mEq Oral BID    insulin lispro  0-4 Units SubCUTAneous TID WC    insulin lispro  0-4 Units SubCUTAneous Nightly    [Held by provider] lactulose  20 g Oral 6 times per day    ipratropium-albuterol  1 ampule Inhalation 4x daily    sodium chloride flush  5-40 mL IntraVENous 2 times per day    sodium chloride flush  5-40 mL IntraVENous 2 times per day    [Held by provider] baclofen  10 mg Oral TID    sodium chloride flush  5-40 mL IntraVENous 2 times per day    vancomycin (VANCOCIN) intermittent dosing (placeholder)   Other RX Placeholder    vancomycin  1,750 mg IntraVENous Once    Followed by    vancomycin  1,250 mg IntraVENous Q12H    sodium chloride flush  5-40 mL IntraVENous 2 times per day       Social History:     Social History     Socioeconomic History    Marital status: Single     Spouse name: Not on file    Number of children: Not on file    Years of education: Not on file    Highest education level: Not on file   Occupational History    Not on file   Tobacco Use    Smoking status: Former     Packs/day: 1.00     Years: 45.00     Pack years: 45.00     Types: Cigarettes     Quit date: 2017     Years since quittin.9    Smokeless tobacco: Never   Vaping Use    Vaping Use: Never used   Substance and Sexual Activity    Alcohol use: Not Currently     Comment: Quit alcohol in 2019- heavier drinking prior to quitting    Drug use: Not Currently     Frequency: 1.0 times per week     Types: Cocaine     Comment: Cocaine- stopped spring 2016    Sexual activity: Yes     Partners: Female   Other Topics Concern    Not on file   Social History Narrative     in the past, retired     Social Determinants of Health     Financial Resource Strain: Low Risk     Difficulty of Paying Living Expenses: Not hard at all   Food Insecurity: No Food Insecurity    Worried About [address] in the Last Year: Never true    [address] in the Last Year: Never true   Transportation Needs: Not on file   Physical Activity: Inactive    Days of Exercise per Week: 0 days    Minutes of Exercise per Session: 0 min   Stress: Not on file   Social Connections: Not on file   Intimate Partner Violence: Not on file   Housing Stability: Not on file       Family History:     Family History   Problem Relation Age of Onset    Cancer Mother         pancreatic    Cancer Father         bone    Diabetes Sister     Asthma Brother     Allergies Brother         MULTIPLE      Medical Decision Making:   I have independently reviewed/ordered the following labs:    CBC with Differential:   Recent Labs     01/02/23  0959 01/02/23  1648 01/03/23  0325 01/03/23  0557   WBC 4.2  --   --  4.8   HGB 8.5*   < > 7.9* 7.2*   HCT 29.9*   < > 27.4* 24.9*   PLT 70*  --   --  70*   LYMPHOPCT 4*  --   --  4*   MONOPCT 15*  --   --  10*    < > = values in this interval not displayed. BMP:  Recent Labs     01/02/23  0959 01/02/23  1406 01/03/23  0557     --  140   K 3.2*  --  3.3*   *  --  111*   CO2 18*  --  21   BUN 4*  --  4*   CREATININE 0.40*  --  0.40*   MG 1.7 1.4* 2.1     Hepatic Function Panel:   Recent Labs     01/02/23  0959 01/03/23  0557   PROT 4.6* 4.0*   LABALBU 2.3* 2.3*   BILITOT 5.4* 5.0*   ALKPHOS 121 95   ALT 35 30   * 110*     No results for input(s): RPR in the last 72 hours. No results for input(s): HIV in the last 72 hours.   No results for input(s): BC in the last 72 hours. Lab Results   Component Value Date/Time    CREATININE 0.40 01/03/2023 05:57 AM    GLUCOSE 102 01/03/2023 05:57 AM    GLUCOSE 65 04/19/2012 11:30 AM       Detailed results: Thank you for allowing us to participate in the care of this patient. Please call with questions. This note is created with the assistance of a speech recognition program.  While intending to generate adocument that actually reflects the content of the visit, the document can still have some errors including those of syntax and sound a like substitutions which may escape proof reading. It such instances, actual meaningcan be extrapolated by contextual diversion.     Pal Terry MD  Office: [phone]  Perfect serve / office [phone]

## 2023-01-03 NOTE — DISCHARGE INSTR - COC
Continuity of Care Form    Patient Name: Mary Colon   :  [id number]  MRN:  [id number]    Admit date:  2022  Discharge date:  23    Code Status Order: Full Code   Advance Directives:   [facility] Documentation       Date/Time Healthcare Directive Type of Healthcare Directive Copy in [address] Agent's Name Healthcare Agent's Phone Number    22 1301 Yes, patient has an advance directive for healthcare treatment Durable power of  for health care;Living will No, copy requested from family -- -- --    22 1003 Yes, patient has an advance directive for healthcare treatment Durable power of  for health care; Health care treatment directive; Living will No, copy requested from family Concha 4 wife [phone]            Admitting Physician:  Shasta Beard MD  PCP: VASU Esteves    Discharging Nurse: Mariah Douglas.  [place] [address] Unit/Room#: 0755/4671-35  Discharging Unit Phone Number: [phone]    Emergency Contact:   Extended Emergency Contact Information  Primary Emergency Contact: Liz Tamayo 300 Phone: [phone]  Work Phone: [phone]  Mobile Phone: [phone]  Relation: Other    Past Surgical History:  Past Surgical History:   Procedure Laterality Date    BUNIONECTOMY      twice on right side    BUNIONECTOMY Left     CARPAL TUNNEL RELEASE Right     COLONOSCOPY      at age 35    COLONOSCOPY  10/05/2016    polyps-pathology tubular adenoma, and abnormal looking mucosa right colon-pathology-tubular adenoma    COLONOSCOPY N/A 2018    COLONOSCOPY POLYPECTOMY COLD BIOPSY performed by Phil Naranjo MD at [address] [place] [address]  2018    Small polyp in the sigmoid colon and excised with biopsy forceps--tubular adenoma    COLONOSCOPY N/A 2022    COLONOSCOPY POLYPECTOMY performed by Phil Naranjo MD at [facility], DIAGNOSTIC      EGD ESOPHAGOGASTRODUODENOSCOPY  12/29/2022    IR PORT PLACEMENT EQUAL OR GREATER THAN 5 YEARS  04/19/2021    IR PORT PLACEMENT EQUAL OR GREATER THAN 5 YEARS 4/19/2021 [facility]    IR TIPS INSERTION  12/01/2022    IR TIPS INSERTION 12/1/2022 Shimon Barr MD [facility]    KNEE SURGERY Left     cyst removed    NASAL SEPTUM SURGERY      NERVE BLOCK Right 11/23/2020    NERVE BLOCK RIGHT CERVICAL STEROID INJECTION  C3-C6 performed by Titi Bonilla MD at [facility]  01/04/2016    steroid injection C7 T1    OTHER SURGICAL HISTORY  11/21/2016    Bilateral Lumbar CACHORRO L4-L5 injections    OTHER SURGICAL HISTORY  12/19/2016    lumbar steroid injection    OTHER SURGICAL HISTORY  09/28/2018    BILATERAL L5 CACHORRO (N/A Back)    OTHER SURGICAL HISTORY Right 11/23/2020    cervical injection    PAIN MANAGEMENT PROCEDURE Left 07/09/2020    EPIDURAL STEROID INJECTION LEFT L4 L5 performed by Titi Bonilla MD at [facility] Left 07/20/2020    LEFT L4 L5 EPIDURAL STEROID INJECTION performed by Titi Bonilla MD at [facility] Bilateral 08/17/2020    LUMBAR FACET BILATERAL L2-L5 performed by Titi Bonilla MD at [facility] Bilateral 12/07/2020    NERVE BLOCK BILATERAL LUMBAR MEDIAL BRANCH L2-L5 performed by Titi Bonilla MD at [address] [place] [address] [place] [address] Shavon [address] AA&/STRD TFRML EPI LUMBAR/SACRAL 1 LEVEL Bilateral 09/06/2018    BILATERAL L5 CACHORRO performed by Titi Bonilla MD at [facility] AA&/STRD TFRML EPI LUMBAR/SACRAL 1 LEVEL N/A 09/28/2018    BILATERAL L5 CACHORRO performed by Titi Bonilla MD at [facility] N/CARPAL TUNNEL SURG Right 08/29/2017    CARPAL TUNNEL RELEASE RIGHT performed by Caren Phan MD at [address] [place] [address] N/CARPAL TUNNEL SURG Left 10/31/2017    CARPAL TUNNEL RELEASE performed by Caren Phan MD at [address] [place] [address] N/A 12/29/2020    EGD BIOPSY performed by Ryan Delgadillo MD at [address] [place] [address] 02/02/2021    EGD BIOPSY and spot marking performed by Sony Rose MD at [address] [place] [address] 02/12/2021    ENDOSCOPIC ULTRASOUND, EGD performed by Kathya Jordan MD at [address] [place] [address]  02/12/2021    EGD DIAGNOSTIC ONLY performed by Kathya Jordan MD at [address] [place] [address] 08/31/2021    EGD BIOPSY performed by Sony Rsoe MD at [address] [place] [address] 01/21/2022    EGD BIOPSY performed by Sony Rose MD at [address] [place] [address] N/A 04/15/2022    EGD ESOPHAGOGASTRODUODENOSCOPY performed by Ryan Delgadillo MD at [place] [address] 06/06/2022    EGD BAND LIGATION performed by Kimberly Monet MD at [address] [place] [address] N/A 06/09/2022    EGD ESOPHAGOGASTRODUODENOSCOPY performed by Kimberly Monet MD at [address] [place] [address] N/A 09/14/2022    EGD ESOPHAGOGASTRODUODENOSCOPY ENDOSCOPIC APC AT GE JUNCTION performed by Garry Angela MD at [address] [place] [address] 10/19/2022    EGD BIOPSY performed by Sony Rose MD at [address] [place] [address] 10/31/2022    EGD with APC performed by Sony Rose MD at [address] [place] [address]  12/29/2022    EGD ESOPHAGOGASTRODUODENOSCOPY performed by Kathya Jordan MD at [address] [place] [address]         Immunization History:   Immunization History   Administered Date(s) Administered    COVID-19, MODERNA BLUE border, Primary or Immunocompromised, (age 12y+), IM, 100 mcg/0.5mL 05/20/2021, 06/22/2021    Hepatitis A 09/20/2016, 03/29/2017    Hepatitis B (Recombivax HB) 09/20/2016, 10/19/2016, 03/29/2017    Influenza 11/29/2012    Influenza, AFLURIA (age 1 yrs+), FLUZONE, (age 10 mo+), MDV, 0.5mL 09/13/2017, 01/24/2019    Influenza, FLUARIX, FLULAVAL, Cathye Kohut (age 10 mo+) AND AFLURIA, (age 1 y+), PF, 0.5mL 09/13/2016, 12/30/2020, 12/23/2021    PPD Test 07/25/2016, 04/23/2022    Pneumococcal Polysaccharide ([id number]) 11/29/2012, 07/25/2016    Tdap (Boostrix, Adacel) 04/06/2017, 06/15/2022       Active Problems:  Patient Active Problem List   Diagnosis Code    Back pain, chronic M54.9, G89.29    Hearing difficulty H91.90    GERD (gastroesophageal reflux disease) K21.9    Cervical radicular pain M54.12    Acute hyperactive alcohol withdrawal delirium (HCC) F10.931    Hypomagnesemia E83.42    Chronic viral hepatitis B without delta agent and without coma (HCC) B18.1    Calculus of gallbladder without cholecystitis K80.20    Hep C w/o coma, chronic (HCC) B18.2    Fatty liver K76.0    Psychophysiologic insomnia F51.04    Cirrhosis (HCC) K74.60    Decompensation of cirrhosis of liver (HCC) K72.90, K74.60    Vertebrogenic low back pain M54.51    DDD (degenerative disc disease), lumbar M51.36    Depression F32. A    Tubular adenoma of colon D12.6    History of colon polyps Z86.010    Gynecomastia, male N62    Lumbar radiculitis M54.16    Lumbar disc herniation M51.26    Tinnitus H93.19    Eustachian tube dysfunction H69.80    Ganglion cyst M67.40    Carpal tunnel syndrome of right wrist G56.01    History of hepatitis C Z86.19    Vitamin D deficiency E55.9    Pure hypercholesterolemia E78.00    Hypokalemia E87.6    Essential hypertension I10    Recurrent major depressive disorder in partial remission (HCC) F33.41    S/P epidural steroid injection Z92.241    Elevated LFTs R79.89    Seasonal allergies J30.2    Lumbar facet arthropathy M47.816    Cervical facet syndrome M47.812    Acute upper GI hemorrhage K92.2    Thrombocytopenia (HCC) D69.6    Hepatitis C virus infection resolved after antiviral drug therapy Z86.19    Gastrointestinal hemorrhage with melena K92.1 Alcohol abuse F10.10    Altered mental status R41.82    Hypocalcemia E83.51    Hypophosphatemia E83.39    Malignant neoplasm of lower third of esophagus (HCC) C15.5    COVID-19 U07.1    Anxiety F41.9    Current smoker F17.200    Severe comorbid illness R69    Gait instability R26.81    Abnormal findings on diagnostic imaging of spine R93.7    Cervical spinal stenosis M48.02    Spinal stenosis of lumbar region with neurogenic claudication M48.062    Low hemoglobin D64.9    Symptomatic anemia, microcytic, acute D64.9    Hypotension I95.9    Former smoker, 50+ pack years, quit 2016 Z87.891    HLD (hyperlipidemia) E78.5    Esophageal adenocarcinoma (HCC) C15.9    Anemia D64.9    Acute kidney injury ([address] N17.9    Ascites due to alcoholic cirrhosis (HCC) M28.75    Shortness of breath R06.02    Drop in hemoglobin R71.0    Portal hypertension (HCC) K76.6    Esophageal varices with bleeding (HCC) I85.01    Esophageal polyp K22.81    Acute kidney failure, unspecified (HCC) N17.9    Muscle weakness (generalized) M62.81    Other abnormalities of gait and mobility R26.89    GI bleed K92.2    Goals of care, counseling/discussion Z71.89    ACP (advance care planning) Z71.89    Palliative care encounter Z51.5    S/P TIPS (transjugular intrahepatic portosystemic shunt) Z95.828    Acute blood loss anemia D62    Near syncope R55    Bleeding gastric varices I86.4    Hepatic encephalopathy V67.61    Periumbilical abdominal pain R10.33    Lezama's esophagus with dysplasia K22.719    Mastoid disorder, bilateral H74.93    Encephalopathy acute G93.40       Isolation/Infection:   Isolation            No Isolation          Patient Infection Status       Infection Onset Added Last Indicated Last Indicated By Review Planned Expiration Resolved Resolved By    None active    Resolved    COVID-19 (Rule Out) 09/12/22 09/12/22 09/12/22 COVID-19 & Influenza Combo (Ordered)   09/12/22 Rule-Out Test Resulted    COVID-19 (Rule Out) 04/14/22 22 COVID-19, Rapid (Ordered)   22 Rule-Out Test Resulted    COVID-19 (Rule Out) 22 COVID-19 & Influenza Combo (Ordered)   22 Rule-Out Test Resulted    COVID-19 21 COVID-19   21     COVID-19 (Rule Out) 21 COVID-19 (Ordered)   21 Rule-Out Test Resulted    COVID-19 (Rule Out) 20 COVID-19 (Ordered)   20 Rule-Out Test Resulted    COVID-19 (Rule Out) 20 COVID-19 (Ordered)   20 Rule-Out Test Resulted            Nurse Assessment:  Last Vital Signs: /67   Pulse 100   Temp 98 °F (36.7 °C) (Temporal)   Resp 21   Ht 5' 10\" (1.778 m)   Wt 192 lb 12.5 oz (87.4 kg)   SpO2 95%   BMI 27.66 kg/m²     Last documented pain score (0-10 scale): Pain Level:  (no)  Last Weight:   Wt Readings from Last 1 Encounters:   23 192 lb 12.5 oz (87.4 kg)     Mental Status:  oriented and alert    IV Access:  - Implanted port, Right chest, Not accessed     Nursing Mobility/ADLs:  Walking   Independent   Transfer  Independent  Bathing  Assisted  Dressing  Assisted  Toileting  Independent  Feeding  [address]  Assisted  Med Delivery   whole    Wound Care Documentation and Therapy:        Elimination:  Continence: Bowel: Yes  Bladder: Incontinent at times. Uses urinal and commode   Urinary Catheter: None   Colostomy/Ileostomy/Ileal Conduit: No       Date of Last BM: 23    Intake/Output Summary (Last 24 hours) at 1/3/2023 1202  Last data filed at 1/3/2023 0929  Gross per 24 hour   Intake 2683.63 ml   Output 3950 ml   Net -1266.37 ml     I/O last 3 completed shifts: In: 0722 [I.V.:1465.8; IV Piggyback:2329.3]  Out: 6656 [Urine:4350]    Safety Concerns:      At Risk for Falls    Impairments/Disabilities:      None    Nutrition Therapy:  Current Nutrition Therapy:   - Oral Diet:  General and Low Sodium (2gm), High Calorie, high protein supplements with meals. Routes of Feeding: Oral  Liquids: Thin Liquids  Daily Fluid Restriction: no  Last Modified Barium Swallow with Video (Video Swallowing Test): not done    Treatments at the Time of Hospital Discharge:   Respiratory Treatments: Duonebs every 4 hours PRN, Oxygen PRN   Oxygen Therapy:  is not on home oxygen therapy. Ventilator:    - No ventilator support    Rehab Therapies: Physical Therapy and Occupational Therapy  Weight Bearing Status/Restrictions: No weight bearing restrictions  Other Medical Equipment (for information only, NOT a DME order):  walker  Other Treatments: N/A    Patient's personal belongings (please select all that are sent with patient):  Glasses    RN SIGNATURE:  Electronically signed by Diane Obando RN on 1/10/23 at 11:50 AM EST    CASE MANAGEMENT/SOCIAL WORK SECTION    Inpatient Status Date: 12-    Readmission Risk Assessment Score:  Readmission Risk              Risk of Unplanned Readmission:  54           Discharging to Facility/ Agency   Name: [facility] care   Address:  Phone:  Fax:    Dialysis Facility (if applicable)   Name:  Address:  Dialysis Schedule:  Phone:  Fax:    / signature: Electronically signed by Angel Pedroza RN on 1/3/23 at 12:06 PM EST    PHYSICIAN SECTION    Prognosis: Fair    Condition at Discharge: Stable    Rehab Potential (if transferring to Rehab): Fair    Recommended Labs or Other Treatments After Discharge: Follow-up with PCP in 3 days. Call for an appointment soon as possible. Follow-up with gastroenterology as instructed. Call for an appointment as soon as possible. Medications as instructed. Obtain BMP in 1 week and follow-up with your PCP regarding the results. Return to the emergency department immediately for any new or worsening concerns.     Physician Certification: I certify the above information and transfer of Kyra Marie  is necessary for the continuing treatment of the diagnosis listed and that he requires Home Care for greater 30 days.      Update Admission H&P: No change in H&P    PHYSICIAN SIGNATURE:  Electronically signed by Dominique Newsome DO on 1/11/23 at 4:51 PM EST

## 2023-01-03 NOTE — PROGRESS NOTES
[facility]  Office: [address], DO, Mari Garciah, DO, Hazel Aide, DO, Rossi Katherin Blood, DO, Anna Marie Corona MD, Ashely Jarquin MD, Cordelia Klein MD, Devante Thomson MD,  Eric Armijo MD, Jayde Theodore MD, Ellie Briggs, DO, Raad Dave MD,  Brittanie Orlando MD, Patrick Casillas MD, Diamond Perez DO, Rasheed Mcneal MD, Myrna Ernandez MD, Mohan John DO, Dre Godoy MD, Patricia Santoyo MD, Joanne Keen MD, Carmen Rosa MD, Nirmal Hoffman DO, John Garcia MD, Michelle Silva MD, Eduin Bee, [facility],  Homero Diaz, CNP, Eder Be, CNP, Nicolas Hunt, CNP,  Marcos Rodriguez, [facility], Nan Lowry, CNP, Ashley Valentine, CNP, Luis Zuly, CNP, Chilango Quinonez, CNP, Tracee Guzman, CNP, Franklin Unger PA-C, Naveen Whitley, CNS, Gil Evans, CNP, Edyta Henson, CNP         [address]    Progress Note    1/3/2023    9:13 AM    Name:   Ata Harry  MRN:     [id number]     [place]:      [de-identified]   Room:   [address] [place] [address] Day:  6  Admit Date:  12/28/2022 11:30 PM    PCP:   VASU Agee  Code Status:  Full Code    Subjective:     C/C:  bleeding   Interval History Status: improved. Pt feels better today. He is more awake, alert, oriented and in no distress. He did have one bloody bowel movement overnight     Brief History:     cirrhosis    Review of Systems:     Constitutional:  negative for chills, fevers, sweats  Respiratory:  negative for cough, dyspnea on exertion, shortness of breath, wheezing  Cardiovascular:  negative for chest pain, chest pressure/discomfort, lower extremity edema, palpitations  Gastrointestinal:  negative for abdominal pain, constipation, diarrhea, nausea, vomiting +bloody bm  Neurological:  negative for dizziness, headache    Medications:      Allergies:  No Known Allergies    Current Meds:   Scheduled Meds:    thiamine  100 mg IntraMUSCular Daily sodium chloride  500 mL IntraVENous Once    metroNIDAZOLE  500 mg IntraVENous Q8H    cefepime  2,000 mg IntraVENous Q8H    insulin lispro  0.05 Units/kg SubCUTAneous TID WC    potassium chloride  10 mEq Oral BID    insulin lispro  0-4 Units SubCUTAneous TID WC    insulin lispro  0-4 Units SubCUTAneous Nightly    [Held by provider] lactulose  20 g Oral 6 times per day    ipratropium-albuterol  1 ampule Inhalation 4x daily    sodium chloride flush  5-40 mL IntraVENous 2 times per day    sodium chloride flush  5-40 mL IntraVENous 2 times per day    baclofen  10 mg Oral TID    sodium chloride flush  5-40 mL IntraVENous 2 times per day    vancomycin (VANCOCIN) intermittent dosing (placeholder)   Other RX Placeholder    vancomycin  1,750 mg IntraVENous Once    Followed by    vancomycin  1,250 mg IntraVENous Q12H    sodium chloride flush  5-40 mL IntraVENous 2 times per day     Continuous Infusions:    [Held by provider] sodium chloride Stopped (01/02/23 1059)    dextrose      dextrose      sodium chloride      sodium chloride      sodium chloride      sodium chloride       PRN Meds: metoprolol, potassium chloride **OR** potassium alternative oral replacement **OR** potassium chloride, magnesium sulfate, sodium phosphate IVPB **OR** sodium phosphate IVPB **OR** sodium phosphate IVPB, glucose, dextrose bolus **OR** dextrose bolus, glucagon (rDNA), dextrose, LORazepam, glucose, dextrose bolus **OR** dextrose bolus, glucagon (rDNA), dextrose, sodium chloride flush, sodium chloride, hydrALAZINE, OLANZapine (ZyPREXA) in sterile water IntraMUSCular, sodium chloride flush, sodium chloride, LORazepam **OR** LORazepam **OR** LORazepam **OR** LORazepam **OR** LORazepam **OR** LORazepam **OR** LORazepam **OR** LORazepam, sodium chloride flush, sodium chloride, sodium chloride, LORazepam, diphenhydrAMINE, sodium chloride flush, ondansetron **OR** ondansetron    Data:     Past Medical History:   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 ([facility] [address] Alcoholic cirrhosis of liver with ascites ([address] Anemia, Anxiety, Arthritis, Back pain, chronic, Lezama esophagus, BPH (benign prostatic hypertrophy), Cholelithiasis, Cirrhosis ([address] COVID-19, COVID-19 vaccine series completed, DDD (degenerative disc disease), lumbar, Depression, Esophageal cancer ([address] Esophageal varices ([address] Fatty liver, GERD (gastroesophageal reflux disease), GI bleed, Hep C w/o coma, chronic ([address] History of alcohol abuse, History of blood transfusion, History of colon polyps, History of tobacco abuse, [place] (hard of hearing), Hyperlipidemia, Hypertension, Hyponatremia, Hypotension, Mastoid disorder, bilateral, PONV (postoperative nausea and vomiting), Port-A-Cath in place, Portal hypertension ([address] Sciatica, Secondary esophageal varices ([address] Shortness of breath, Spinal stenosis, Stomach ulcer, Thrombocytopenia ([address] Tubular adenoma of colon, Vitamin D deficiency, and Wears glasses. Social History:   reports that he quit smoking about 5 years ago. His smoking use included cigarettes. He has a 45.00 pack-year smoking history. He has never used smokeless tobacco. He reports that he does not currently use alcohol. He reports that he does not currently use drugs after having used the following drugs: Cocaine. Frequency: 1.00 time per week. Family History:   Family History   Problem Relation Age of Onset    Cancer Mother         pancreatic    Cancer Father         bone    Diabetes Sister     Asthma Brother     Allergies Brother         MULTIPLE       Vitals:  /62   Pulse 92   Temp 97.8 °F (36.6 °C) (Axillary)   Resp 22   Ht 5' 10\" (1.778 m)   Wt 192 lb 12.5 oz (87.4 kg)   SpO2 94%   BMI 27.66 kg/m²   Temp (24hrs), Av.9 °F (36.6 °C), Min:97.5 °F (36.4 °C), Max:98.2 °F (36.8 °C)    Recent Labs     23  1136 23  1651 23  0812   POCGLU 134* 130* 164* 108       I/O (24Hr):     Intake/Output Summary (Last 24 hours) at 1/3/2023 0913  Last data filed at 1/3/2023 7589  Gross per 24 hour   Intake 2493.82 ml   Output 3950 ml   Net -1456.18 ml       Labs:  Hematology:  Recent Labs     01/01/23  0625 01/01/23  1458 01/01/23  1619 01/01/23  1620 01/02/23  0959 01/02/23  1055 01/02/23  1648 01/02/23  2338 01/03/23  0325 01/03/23  0557   WBC 3.5  --   --   --  4.2  --   --   --   --  4.8   RBC 3.13*  --   --   --  3.35*  --   --   --   --  2.84*   HGB 8.0*  --   --    < > 8.5*  --    < > 7.3* 7.9* 7.2*   HCT 28.0*  --   --    < > 29.9*  --    < > 24.1* 27.4* 24.9*   MCV 89.5  --   --   --  89.3  --   --   --   --  87.7   MCH 25.6  --   --   --  25.4  --   --   --   --  25.4   MCHC 28.6  --   --   --  28.4  --   --   --   --  28.9   RDW 20.2*  --   --   --  20.5*  --   --   --   --  20.7*   PLT 84*  --   --   --  70*  --   --   --   --  70*   MPV 10.0  --   --   --  9.7  --   --   --   --  10.8   CRP  --  26.9* 26.9*  --  31.8*  --   --   --   --   --    INR 1.3  --   --   --   --  1.4  --   --   --  1.6   DDIMER  --   --   --   --   --  1.79  --   --   --   --     < > = values in this interval not displayed.      Chemistry:  Recent Labs     01/01/23  1458 01/01/23  1619 01/02/23  0959 01/02/23  1406 01/02/23 1812 01/02/23 2048 01/03/23  0325 01/03/23  0557    140 139  --   --   --   --  140   K 3.3* 3.4* 3.2*  --   --   --   --  3.3*   * 110* 113*  --   --   --   --  111*   CO2 17* 18* 18*  --   --   --   --  21   GLUCOSE 260* 257* 141*  --   --   --   --  102*   BUN 6* 6* 4*  --   --   --   --  4*   CREATININE 0.55* 0.53* 0.40*  --   --   --   --  0.40*   MG 1.9 2.0 1.7 1.4*  --   --   --  2.1   ANIONGAP 12 12 8*  --   --   --   --  8*   LABGLOM >60 >60 >60  --   --   --   --  >60   CALCIUM 7.6* 7.6* 7.3*  --   --   --   --  7.5*   PHOS  --   --  1.5* 1.4*  --   --   --  1.7*   PROBNP 417*  --  563*  --   --   --   --  970   LACTACIDWB  --   --  3.2* 3.4*   < > 2.5* 2.0 1.5    < > = values in this interval not displayed. Recent Labs     01/01/23  1619 01/01/23  1620 01/01/23 2000 01/02/23  0745 01/02/23  0959 01/02/23  1136 01/02/23  1651 01/02/23 2007 01/03/23  0557 01/03/23  0812   PROT 4.2*  --   --   --  4.6*  --   --   --  4.0*  --    LABALBU 2.2*  --   --   --  2.3*  --   --   --  2.3*  --    LABA1C  --  4.5  --   --   --   --   --   --   --   --    TSH 0.19*  --   --   --   --   --   --   --   --   --    *  --   --   --  135*  --   --   --  110*  --    ALT 27  --   --   --  35  --   --   --  30  --    ALKPHOS 113  --   --   --  121  --   --   --  95  --    BILITOT 4.9*  --   --   --  5.4*  --   --   --  5.0*  --    AMMONIA  --   --   --   --  38  --   --   --  36  --    POCGLU  --   --  144* 137*  --  134* 130* 164*  --  108     ABG:  Lab Results   Component Value Date/Time    FIO2 INFORMATION NOT PROVIDED 12/30/2022 01:35 AM     Lab Results   Component Value Date/Time    SPECIAL RT HAND 5ML 01/01/2023 02:36 PM     Lab Results   Component Value Date/Time    CULTURE NO GROWTH 1 DAY 01/01/2023 02:36 PM       Radiology:  XR CHEST (SINGLE VIEW FRONTAL)    Result Date: 12/27/2022  1. Minimal bibasilar atelectatic changes with trace left pleural fluid which is slightly decreased from 12/09/2022. XR RADIUS ULNA RIGHT (2 VIEWS)    Result Date: 1/1/2023  No fracture or dislocation. Mild soft tissue swelling     CT HEAD WO CONTRAST    Result Date: 12/31/2022  CT BRAIN: 1. No evidence of acute hemorrhage, large territorial hypodensity, significant mass effect/midline shift, or ventriculomegaly 2. Involutional parenchymal changes. 3. Age-indeterminate (likely chronic) left basal ganglia lacunar infarct. If clinically indicated, can consider further evaluation with MRI if no contraindications. CT CERVICAL SPINE: 1. No acute abnormality of the cervical spine. 2. Multilevel cervical spondylosis, most notable at C5-C6 and C6-C7. No high-grade bony spinal canal stenosis.      CT CERVICAL SPINE WO CONTRAST    Result Date: 12/31/2022  CT BRAIN: 1. No evidence of acute hemorrhage, large territorial hypodensity, significant mass effect/midline shift, or ventriculomegaly 2. Involutional parenchymal changes. 3. Age-indeterminate (likely chronic) left basal ganglia lacunar infarct. If clinically indicated, can consider further evaluation with MRI if no contraindications. CT CERVICAL SPINE: 1. No acute abnormality of the cervical spine. 2. Multilevel cervical spondylosis, most notable at C5-C6 and C6-C7. No high-grade bony spinal canal stenosis. CT ABDOMEN PELVIS W IV CONTRAST Additional Contrast? Radiologist Recommendation    Result Date: 1/3/2023  1. There is a short segment of narrowing of the rectum near the rectosigmoid junction compared to the recent exam and may represent spasm rather than underlying mass. Fluid-filled loops of small bowel and colon suggesting diarrhea with enteritis or colitis. Evaluation of true wall thickening is limited by the diffuse edematous state. 2.  Cirrhotic liver with tips, ascites, and sequela of portal hypertension. 3.  Thickening of the gastric mucosal folds and into the distal esophagus. Correlate with prior history of malignancy. 4.  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 5.  Pleural effusions and pericardial effusion are redemonstrated. The opacified portions of the lower lungs appears to be from compressive atelectasis. CT ABDOMEN PELVIS W IV CONTRAST Additional Contrast? None    Result Date: 12/27/2022  Stable circumferential wall thickening of the distal esophagus and gastroesophageal junction consistent with known esophageal malignancy. No significant change since prior examination. No esophageal obstruction. Cirrhotic liver with associated portal venous hypertension, progressive ascites and stable splenomegaly.   TIPS appears in place and patent Cholelithiasis Interval development of bibasal infiltrates and moderate bilateral pleural effusions as well as mild-to-moderate pericardial effusion     IR FLUORO GUIDED NEEDLE PLACEMENT    Result Date: 12/28/2022  Successful ultrasound-guided placement of a right upper extremity midline venous catheter. US LIVER    Result Date: 12/28/2022  1. Cholelithiasis without definite findings of acute cholecystitis. 2. Patent TIPS. Specific velocities recorded above. 3. Small volume abdominal ascites. 4. Hepatic morphologic features typical of cirrhosis. US GALLBLADDER RUQ    Result Date: 12/30/2022  Multiple cholelithiasis but no ultrasound evidence cholecystitis. Nondilated biliary tree. Cirrhosis. Patent TIPS. Ascites. Additional findings, as above. XR CHEST PORTABLE    Result Date: 1/3/2023  Increasing vascular congestion with small pleural effusions, suggestive of developing edema. XR CHEST PORTABLE    Result Date: 1/2/2023  Mild pulmonary vascular congestion without evidence of overt edema     XR CHEST PORTABLE    Result Date: 12/31/2022  Little change from prior study. Cardiomegaly, vascular congestion, effusions, atelectasis and possible superimposed acute airspace disease are again noted. XR CHEST PORTABLE    Result Date: 12/30/2022  Cardiomegaly with probable mild central vascular congestion and similar bilateral pleural effusions with compressive atelectasis; filtrate at either base a consideration. No pulmonary edema. IR US GUIDED PARACENTESIS    Result Date: 12/28/2022  Successful ultrasound-guided therapeutic and diagnostic paracentesis. IR REVISION TIPS    Result Date: 1/2/2023  TIPS stent reassessment showing 6 mm Hg portosystemic shunt; no significant variceal filling demonstrated, but successful left gastric vein embolization performed. TIPS stent angioplasty to 10 mm with excellent flow demonstrated. The findings were discussed with Dr. Destiny Jackson post procedure.      MRI BRAIN WO CONTRAST    Result Date: 1/2/2023  No evidence of acute ischemia. New moderate-sized bilateral mastoid effusions. Small old lacune within the left basal ganglia. New low T1 signal within the marrow elements diffusely likely representing a marrow replacement process such as anemia. Physical Examination:        General appearance:  alert, cooperative and no distress  Mental Status:  oriented to person, place and time and normal affect  Eyes: :+scleral icterus.    Lungs:  clear to auscultation bilaterally, normal effort  Heart:  regular rate and rhythm, no murmur  Abdomen:  soft, nontender, nondistended, normal bowel sounds, no masses, +hepatomegaly, +splenomegaly +fluid wave  Extremities:  no edema, redness, tenderness in the calves  Skin:  no gross lesions, rashes, induration    Assessment:        Hospital Problems             Last Modified POA    Ascites due to alcoholic cirrhosis ([address] 51/51/8408 Yes    Shortness of breath 12/29/2022 Yes    Portal hypertension ([address] 12/29/2022 Yes    Esophageal varices with bleeding ([address] 12/29/2022 Yes    Other abnormalities of gait and mobility 12/29/2022 Yes    S/P TIPS (transjugular intrahepatic portosystemic shunt) 12/29/2022 Yes    Acute blood loss anemia 12/29/2022 Yes    Near syncope 12/29/2022 Yes    Bleeding gastric varices 12/29/2022 Yes    Hepatic encephalopathy 45/87/4644 Yes    Periumbilical abdominal pain 12/29/2022 Yes    Lezama's esophagus with dysplasia 12/30/2022 Yes    Encephalopathy acute 1/1/2023 Yes    Acute hyperactive alcohol withdrawal delirium ([address] 12/30/2022 Yes    Decompensation of cirrhosis of liver ([address] 1/1/2023 Yes    DDD (degenerative disc disease), lumbar 12/29/2022 Yes    Acute upper GI hemorrhage 12/29/2022 Yes    Gastrointestinal hemorrhage with melena 12/29/2022 Yes    Mastoid disorder, bilateral 12/31/2022 Yes    Overview Signed 12/31/2022  4:50 PM by Dani Katz MD     biLateral mastoid effusion            Plan:        Acute upper GI bleed due to decompensated alcoholic cirrhosis: EGD showed large varices with moderate portal hypertensive gastropathy. Had 4.5 L removed with paracentesis. Received 3 units of blood total.  Concern for sepsis/pneumonia: Continue on antibiotics, will consult ID. Cultures pending. PCM suggested: Dietary consulted, continue feeding.    PTOT  Overall prognosis guarded    Wolf Frey DO  1/3/2023  9:13 AM

## 2023-01-03 NOTE — SIGNIFICANT EVENT
Patient seen after notified about a large 1x bloody bowl movement. Patient seen resting in bed, no distress, denies abdominal pain, sob, lightheadedness or cp. Patient said no hematemesis during this time. He was mentation well compared to noted from  earlier in day. No asterixis appreciated. Lungs have reduced air movt in bases but no signs of resp distress. Abdomen soft, non tender, nml BS without peritoneal signs. Still appearing edematous but not anasarca. Hemodynamic stable with upper limit of nml HR. Labs show downtrend in Hgb and PLTs overall. Concern for some blood loss during Tips procedure. Repeat hgb showed mild decrease of hgb 7.5 with plans to transfuse given concern for ongoing bleed despite on appropriate regimen and ct abdomen for potential localization. Patient tired of the diarrhea from lactulose. Will temporarily place lactulose on hold to avoid GI over stimulation and allow patient to rest. Resume in am. GI updated as per report. Will monitor overnight closely and continue to work up further. Nurse updates at bedside.

## 2023-01-03 NOTE — PLAN OF CARE
GI plan of care:    Due to ongoing rectal bleeding, we will plan for EGD today.   Please keep pt NPO  Will follow    Penny Cano CNP

## 2023-01-03 NOTE — OR NURSING
PT HAD IN PT PARACENTESIS TODAY. 2.2 LITERS OF YELLOW ASCITIC FLUID COLLECTED. 2X2 GAUZE AND TEGADERM TO CATHETER SITE. DRY AND INTACT. SPECIMEN SENT. PT TOLERATED WELL. BP STABLE THROUGHOUT PROCEDURE. REPORT CALLED. READY FOR TRANSPORT.

## 2023-01-04 PROBLEM — J18.9 COMMUNITY ACQUIRED BILATERAL LOWER LOBE PNEUMONIA: Status: ACTIVE | Noted: 2023-01-04

## 2023-01-04 PROBLEM — R79.82 CRP ELEVATED: Status: ACTIVE | Noted: 2023-01-04

## 2023-01-04 LAB
ABSOLUTE EOS #: 0.1 K/UL (ref 0–0.44)
ABSOLUTE IMMATURE GRANULOCYTE: 0.05 K/UL (ref 0–0.3)
ABSOLUTE LYMPH #: 0.42 K/UL (ref 1.1–3.7)
ABSOLUTE MONO #: 0.83 K/UL (ref 0.1–1.2)
ALBUMIN SERPL-MCNC: 2 G/DL (ref 3.5–5.2)
ALBUMIN/GLOBULIN RATIO: 1.1 (ref 1–2.5)
ALP BLD-CCNC: 122 U/L (ref 40–129)
ALT SERPL-CCNC: 32 U/L (ref 5–41)
ANION GAP SERPL CALCULATED.3IONS-SCNC: 6 MMOL/L (ref 9–17)
AST SERPL-CCNC: 127 U/L
BASOPHILS # BLD: 0 % (ref 0–2)
BASOPHILS ABSOLUTE: 0 K/UL (ref 0–0.2)
BILIRUB SERPL-MCNC: 4.6 MG/DL (ref 0.3–1.2)
BILIRUBIN DIRECT: 2.8 MG/DL
BILIRUBIN, INDIRECT: 1.8 MG/DL (ref 0–1)
BUN BLDV-MCNC: 5 MG/DL (ref 8–23)
CALCIUM SERPL-MCNC: 7.5 MG/DL (ref 8.6–10.4)
CHLORIDE BLD-SCNC: 111 MMOL/L (ref 98–107)
CO2: 18 MMOL/L (ref 20–31)
CREAT SERPL-MCNC: 0.51 MG/DL (ref 0.7–1.2)
EOSINOPHILS RELATIVE PERCENT: 2 % (ref 1–4)
GFR SERPL CREATININE-BSD FRML MDRD: >60 ML/MIN/1.73M2
GLUCOSE BLD-MCNC: 87 MG/DL (ref 75–110)
GLUCOSE BLD-MCNC: 87 MG/DL (ref 75–110)
GLUCOSE BLD-MCNC: 89 MG/DL (ref 70–99)
GLUCOSE BLD-MCNC: 93 MG/DL (ref 75–110)
GLUCOSE BLD-MCNC: 99 MG/DL (ref 75–110)
HCT VFR BLD CALC: 26.8 % (ref 40.7–50.3)
HEMOGLOBIN: 7.8 G/DL (ref 13–17)
IMMATURE GRANULOCYTES: 1 %
LYMPHOCYTES # BLD: 8 % (ref 24–43)
LYMPHOCYTES, BODY FLUID: 7 %
MCH RBC QN AUTO: 25.9 PG (ref 25.2–33.5)
MCHC RBC AUTO-ENTMCNC: 29.1 G/DL (ref 28.4–34.8)
MCV RBC AUTO: 89 FL (ref 82.6–102.9)
MONOCYTES # BLD: 16 % (ref 3–12)
MORPHOLOGY: ABNORMAL
NEUTROPHIL, FLUID: 1 %
NRBC AUTOMATED: 0 PER 100 WBC
OTHER CELLS FLUID: NORMAL %
PDW BLD-RTO: 20.5 % (ref 11.8–14.4)
PLATELET # BLD: 94 K/UL (ref 138–453)
PMV BLD AUTO: 10 FL (ref 8.1–13.5)
POTASSIUM SERPL-SCNC: 4 MMOL/L (ref 3.7–5.3)
RBC # BLD: 3.01 M/UL (ref 4.21–5.77)
RBC FLUID: <3000 /MM3
SEG NEUTROPHILS: 73 % (ref 36–65)
SEGMENTED NEUTROPHILS ABSOLUTE COUNT: 3.8 K/UL (ref 1.5–8.1)
SODIUM BLD-SCNC: 135 MMOL/L (ref 135–144)
SPECIMEN TYPE: NORMAL
SURGICAL PATHOLOGY REPORT: NORMAL
TOTAL PROTEIN: 3.9 G/DL (ref 6.4–8.3)
WBC # BLD: 5.2 K/UL (ref 3.5–11.3)
WBC FLUID: 90 /MM3

## 2023-01-04 PROCEDURE — 51798 US URINE CAPACITY MEASURE: CPT

## 2023-01-04 PROCEDURE — 36415 COLL VENOUS BLD VENIPUNCTURE: CPT

## 2023-01-04 PROCEDURE — 80076 HEPATIC FUNCTION PANEL: CPT

## 2023-01-04 PROCEDURE — 82947 ASSAY GLUCOSE BLOOD QUANT: CPT

## 2023-01-04 PROCEDURE — 97530 THERAPEUTIC ACTIVITIES: CPT

## 2023-01-04 PROCEDURE — 99232 SBSQ HOSP IP/OBS MODERATE 35: CPT | Performed by: INTERNAL MEDICINE

## 2023-01-04 PROCEDURE — 99222 1ST HOSP IP/OBS MODERATE 55: CPT | Performed by: FAMILY MEDICINE

## 2023-01-04 PROCEDURE — 6370000000 HC RX 637 (ALT 250 FOR IP): Performed by: NURSE PRACTITIONER

## 2023-01-04 PROCEDURE — 80048 BASIC METABOLIC PNL TOTAL CA: CPT

## 2023-01-04 PROCEDURE — 2700000000 HC OXYGEN THERAPY PER DAY

## 2023-01-04 PROCEDURE — 6360000002 HC RX W HCPCS: Performed by: NURSE PRACTITIONER

## 2023-01-04 PROCEDURE — 94640 AIRWAY INHALATION TREATMENT: CPT

## 2023-01-04 PROCEDURE — 2580000003 HC RX 258: Performed by: NURSE PRACTITIONER

## 2023-01-04 PROCEDURE — 97535 SELF CARE MNGMENT TRAINING: CPT

## 2023-01-04 PROCEDURE — 85025 COMPLETE CBC W/AUTO DIFF WBC: CPT

## 2023-01-04 PROCEDURE — 94761 N-INVAS EAR/PLS OXIMETRY MLT: CPT

## 2023-01-04 PROCEDURE — 6370000000 HC RX 637 (ALT 250 FOR IP): Performed by: INTERNAL MEDICINE

## 2023-01-04 PROCEDURE — 97110 THERAPEUTIC EXERCISES: CPT

## 2023-01-04 PROCEDURE — 2060000000 HC ICU INTERMEDIATE R&B

## 2023-01-04 PROCEDURE — C9113 INJ PANTOPRAZOLE SODIUM, VIA: HCPCS | Performed by: NURSE PRACTITIONER

## 2023-01-04 RX ORDER — PANTOPRAZOLE SODIUM 40 MG/1
40 TABLET, DELAYED RELEASE ORAL
Status: DISCONTINUED | OUTPATIENT
Start: 2023-01-05 | End: 2023-01-11 | Stop reason: HOSPADM

## 2023-01-04 RX ORDER — CARVEDILOL 3.12 MG/1
3.12 TABLET ORAL 2 TIMES DAILY WITH MEALS
Status: DISCONTINUED | OUTPATIENT
Start: 2023-01-04 | End: 2023-01-06

## 2023-01-04 RX ORDER — LACTULOSE 10 G/15ML
10 SOLUTION ORAL 3 TIMES DAILY
Status: DISCONTINUED | OUTPATIENT
Start: 2023-01-04 | End: 2023-01-08

## 2023-01-04 RX ORDER — LACTULOSE 10 G/15ML
20 SOLUTION ORAL 3 TIMES DAILY
Status: DISCONTINUED | OUTPATIENT
Start: 2023-01-04 | End: 2023-01-04

## 2023-01-04 RX ADMIN — LACTULOSE 10 G: 20 SOLUTION ORAL at 21:20

## 2023-01-04 RX ADMIN — CARVEDILOL 3.12 MG: 3.12 TABLET, FILM COATED ORAL at 17:48

## 2023-01-04 RX ADMIN — LEVOFLOXACIN 500 MG: 500 TABLET, FILM COATED ORAL at 08:40

## 2023-01-04 RX ADMIN — IPRATROPIUM BROMIDE AND ALBUTEROL SULFATE 1 AMPULE: .5; 3 SOLUTION RESPIRATORY (INHALATION) at 20:23

## 2023-01-04 RX ADMIN — SODIUM CHLORIDE, PRESERVATIVE FREE 40 MG: 5 INJECTION INTRAVENOUS at 08:41

## 2023-01-04 RX ADMIN — SODIUM CHLORIDE, PRESERVATIVE FREE 10 ML: 5 INJECTION INTRAVENOUS at 21:21

## 2023-01-04 RX ADMIN — IPRATROPIUM BROMIDE AND ALBUTEROL SULFATE 1 AMPULE: .5; 3 SOLUTION RESPIRATORY (INHALATION) at 16:41

## 2023-01-04 RX ADMIN — IPRATROPIUM BROMIDE AND ALBUTEROL SULFATE 1 AMPULE: .5; 3 SOLUTION RESPIRATORY (INHALATION) at 08:17

## 2023-01-04 RX ADMIN — POTASSIUM CHLORIDE 10 MEQ: 1500 TABLET, EXTENDED RELEASE ORAL at 21:20

## 2023-01-04 RX ADMIN — POTASSIUM CHLORIDE 10 MEQ: 1500 TABLET, EXTENDED RELEASE ORAL at 08:40

## 2023-01-04 RX ADMIN — SODIUM CHLORIDE, PRESERVATIVE FREE 10 ML: 5 INJECTION INTRAVENOUS at 08:41

## 2023-01-04 ASSESSMENT — ENCOUNTER SYMPTOMS
SHORTNESS OF BREATH: 1
EYE REDNESS: 0
ABDOMINAL DISTENTION: 1
RHINORRHEA: 0
ABDOMINAL PAIN: 0
COUGH: 1
EYE DISCHARGE: 0
APNEA: 0
COLOR CHANGE: 0

## 2023-01-04 NOTE — CONSULTS
Palliative Care Inpatient Consult    NAME:  Xavier Frey  MEDICAL RECORD NUMBER:  [id number]  AGE: 60 y.o. GENDER: male  : 1960  TODAY'S DATE:  2023    Reasons for Consultation:    Symptom and/or pain management  Provision of information regarding PC and/or hospice philosophies  Complex, time-intensive communication and interdisciplinary psychosocial support  Clarification of goals of care and/or assistance with difficult decision-making  Guidance in regards to resources and transition(s)    Members of PC team contributing to this consultation are:  Devika Gustafson DO - fellow  Melissa Pittman MD - attending    Plan      Palliative Interaction:  The palliative care service was able to meet with the patient this afternoon. We explained our role as the palliative care physicians in his care. We explained that we are a service that provides extra support and assist in complex medical decision-making. Patient does appear to be somewhat confused at this time. He does confirm that he is here with a GI bleed. He states that his last drink was at the end of November. He does not recall previously meeting with palliative care. He continues to proceed with full therapy to help treat his disease. He continues to want to be a full code. He does acknowledge that his ex-wife has healthcare power of . We will continue to follow the patient, as I do not believe he fully understands how sick he is. He would be a good candidate for palliative care clinic moving forward. Attempted to call ex-wife, Benton Flores, to investigate if his POA paperwork was indeed complete. No answer. Will continue to attempt to reach. Palliative care will continue to follow. Education/support to patient  Providing support for coping/adaptation/distress of patient  Code status clarified: Full Code  \    1- Symptom management/ pain control     Pain Assessment:  The patient is not having any pain.                Anxiety: none                          Dyspnea:  none                          Fatigue:  none    Other: none    We feel the patient symptoms are being controlled. His current regimen is reviewed by myself and discussed with the staff. We recommend adjusting his medications as follows: No changes from a palliative care perspective. 2- Goals of care evaluation   The patient goals of care are live longer, improve or maintain function/quality of life, and preserve independence/autonomy/control   Goals of care discussed with:    [x] Patient independently    [] Patient and Family    [] Family or Healthcare DPOA independently    [] Unable to discuss with patient, family/DPOA not present    3- Code Status  Full Code    4- Other recommendations   - We will continue to provide comfort and support to the patient and the family      Palliative Care will continue to follow Mr. Bryce Maria care as needed. Thank you for allowing Palliative Care to participate in the care of Mr. Lupe Redman . History of Present Illness     The patient is a 60 y.o. male who presented to [facility] on 12/28/2022 for evaluation of GI bleeding. He has a very complicated GI history with a history of Lezama's esophagus/esophageal adenocarcinoma status post chemoradiation, decompensated alcoholic cirrhosis, and recurrent GI bleeding. GI, interventional radiology, infectious disease, internal medicine are involved in the patient's care. The patient underwent EGD on 1/3/2022 which revealed grade 1-2 varices in the distal esophagus without stigmata of bleeding with few AVMs in the cardia and mild diffuse portal hypertensive gastropathy. The patient was previously seen by our service on October 31, 2022. At that time, the patient wanted to continue with aggressive treatments. His ex-wife, Mylene Mayberry was his POA at that time.     Palliative care has been consulted for goals of care discussion and CODE STATUS discussion.     Referred to Palliative Care by   [x] Physician        Active Hospital Problems    Diagnosis Date Noted    Encephalopathy acute [G93.40] 01/01/2023     Priority: Medium    Lezama's esophagus with dysplasia [J69.750] 12/30/2022     Priority: Medium    Acute blood loss anemia [D62] 12/29/2022     Priority: Medium    Near syncope [R55] 12/29/2022     Priority: Medium    Bleeding gastric varices [I86.4] 12/29/2022     Priority: Medium    Hepatic encephalopathy [K76.82] 12/29/2022     Priority: Medium    Periumbilical abdominal pain [R10.33] 12/29/2022     Priority: Medium    S/P TIPS (transjugular intrahepatic portosystemic shunt) [Z95.828] 12/01/2022     Priority: Medium    Esophageal varices with bleeding ([address] [I85.01] 06/07/2022     Priority: Medium    Portal hypertension ([address] [K76.6]      Priority: Medium    Shortness of breath [R06.02]      Priority: Medium    Ascites due to alcoholic cirrhosis ([address] [P96.67] 04/26/2022     Priority: Medium    Other abnormalities of gait and mobility [R26.89] 07/28/2016     Priority: Medium    Mastoid disorder, bilateral [H74.93] 12/31/2022    Gastrointestinal hemorrhage with melena [K92.1]     Acute upper GI hemorrhage [K92.2] 12/23/2020    DDD (degenerative disc disease), lumbar [M51.36] 10/06/2016    Decompensation of cirrhosis of liver ([address] [K72.90, K74.60] 09/15/2016    Acute hyperactive alcohol withdrawal delirium ([address] [F10.931] 07/20/2016       PAST MEDICAL HISTORY      Diagnosis Date    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 ([address]     Alcoholic cirrhosis of liver with ascites ([address]     Anemia 04/13/2022    Anxiety     Arthritis     Back pain, chronic     dr. Maru Martínez, orthopedic, every 3-4 months, gets steroid injection    Lezama esophagus     BPH (benign prostatic hypertrophy)     Cholelithiasis     Cirrhosis ([address]     COVID-19 12/2020    pt reports he had a positive test while at [facility] in 2020, was asymptomatic    COVID-19 vaccine series completed 5/20/2021, 6/22/2021    Moderna 5/20/2021, 6/22/2021    DDD (degenerative disc disease), lumbar     Depression     Esophageal cancer ([address]     INVASIVE ADENOCARCINOMA ARISING IN TUBULAR ADENOMA WITH HIGH GRADE DYSPLASIA, ASSOCIATED WITH FOCAL INTESTINAL METAPLASIA     Esophageal varices (HCC)     Fatty liver     GERD (gastroesophageal reflux disease)     GI bleed     Hep C w/o coma, chronic ([address]     History of alcohol abuse     6-12 beers a day; quit drinking 2019    History of blood transfusion     History of colon polyps 2016    History of tobacco abuse     [place] (hard of hearing)     Hyperlipidemia     Hypertension     Hyponatremia 07/20/2016    Hypotension 12/20/2021    Mastoid disorder, bilateral 12/31/2022    biLateral mastoid effusion    PONV (postoperative nausea and vomiting)     Port-A-Cath in place     right upper chest    Portal hypertension (HCC)     Sciatica     Secondary esophageal varices ([address] 06/07/2022    Shortness of breath     Spinal stenosis     Stomach ulcer     hx of    Thrombocytopenia ([address] 12/23/2020    Tubular adenoma of colon 2016, 2018    Vitamin D deficiency     Wears glasses        PAST SURGICAL HISTORY  Past Surgical History:   Procedure Laterality Date    BUNIONECTOMY      twice on right side    BUNIONECTOMY Left     CARPAL TUNNEL RELEASE Right     COLONOSCOPY      at age 35    COLONOSCOPY  10/05/2016    polyps-pathology tubular adenoma, and abnormal looking mucosa right colon-pathology-tubular adenoma    COLONOSCOPY N/A 03/30/2018    COLONOSCOPY POLYPECTOMY COLD BIOPSY performed by Sariah French MD at [address] [place] [address]  03/30/2018    Small polyp in the sigmoid colon and excised with biopsy forceps--tubular adenoma    COLONOSCOPY N/A 04/16/2022    COLONOSCOPY POLYPECTOMY performed by Sariah French MD at [facility], DIAGNOSTIC      EGD    ESOPHAGOGASTRODUODENOSCOPY  12/29/2022    ESOPHAGOGASTRODUODENOSCOPY N/A 01/03/2023    IR PORT PLACEMENT EQUAL OR GREATER THAN 5 YEARS  04/19/2021    IR PORT PLACEMENT EQUAL OR GREATER THAN 5 YEARS 4/19/2021 [facility]    IR TIPS INSERTION  12/01/2022    IR TIPS INSERTION 12/1/2022 Breanne Hernández MD [facility]    KNEE SURGERY Left     cyst removed    NASAL SEPTUM SURGERY      NERVE BLOCK Right 11/23/2020    NERVE BLOCK RIGHT CERVICAL STEROID INJECTION  C3-C6 performed by Leah Godoy MD at [address]  01/04/2016    steroid injection C7 T1    OTHER SURGICAL HISTORY  11/21/2016    Bilateral Lumbar CACHORRO L4-L5 injections    OTHER SURGICAL HISTORY  12/19/2016    lumbar steroid injection    OTHER SURGICAL HISTORY  09/28/2018    BILATERAL L5 CACHORRO (N/A Back)    OTHER SURGICAL HISTORY Right 11/23/2020    cervical injection    PAIN MANAGEMENT PROCEDURE Left 07/09/2020    EPIDURAL STEROID INJECTION LEFT L4 L5 performed by Leah Godoy MD at [facility] Left 07/20/2020    LEFT L4 L5 EPIDURAL STEROID INJECTION performed by Leah Godoy MD at [facility] Bilateral 08/17/2020    LUMBAR FACET BILATERAL L2-L5 performed by Leah Godoy MD at [facility] Bilateral 12/07/2020    NERVE BLOCK BILATERAL LUMBAR MEDIAL BRANCH L2-L5 performed by Leah Godoy MD at [facility]    VA NEUROPLASTY &/TRANSPOS MEDIAN NRV CARPAL [place] Sink Right 08/29/2017    CARPAL TUNNEL RELEASE RIGHT performed by Korey Scherer MD at [address] &/TRANSPOS MEDIAN NRV CARPAL TUNNE Left 10/31/2017    CARPAL TUNNEL RELEASE performed by Korey Scherer MD at [address] [facility]&/STRD TFRML EPI LUMBAR/SACRAL 1 LEVEL Bilateral 09/06/2018    BILATERAL L5 CACHORRO performed by Leah Godoy MD at [address] AA&/STRD TFRML EPI LUMBAR/SACRAL 1 LEVEL N/A 09/28/2018    BILATERAL L5 CACHORRO performed by Leah Godoy MD at [facility] 12/29/2020    EGD BIOPSY performed by Rolando Jeter, MD at [address] [place] [address] 2021    EGD BIOPSY and spot marking performed by Tomás Ojeda MD at [address] [place] [address] 2021    ENDOSCOPIC ULTRASOUND, EGD performed by Wesley Jackson MD at [place] [address]  2021    EGD DIAGNOSTIC ONLY performed by Wesley Jackson MD at [address] [place] [address] 2021    EGD BIOPSY performed by Tomás Ojeda MD at [address] [place] [address] 2022    EGD BIOPSY performed by Tomás Ojeda MD at [address] [place] [address] N/A 04/15/2022    EGD ESOPHAGOGASTRODUODENOSCOPY performed by Devin Trejo MD at [address] [place] [address] 2022    EGD BAND LIGATION performed by Loreta Verduzco MD at [address] [place] [address] N/A 2022    EGD ESOPHAGOGASTRODUODENOSCOPY performed by Loreta Verduzco MD at [address] [place] [address] N/A 2022    EGD ESOPHAG[facility]GASTR[facility]ODENOSCOPY [facility] performed by Quique Ortiz MD at [address] [place] [address] 10/19/2022    EGD BIOPSY performed by Tomás Ojeda MD at [address] [place] [address] 10/31/2022    EGD with APC performed by Tomás Ojeda MD at [address] [place] [address]  2022    EGD ESOPHAGOGASTRODUODENOSCOPY performed by Wesley aJckson MD at [place] [address] History     Tobacco Use    Smoking status: Former     Packs/day: 1.00     Years: 45.00     Pack years: 45.00     Types: Cigarettes     Quit date: 2017     Years since quittin.9    Smokeless tobacco: Never   Vaping Use    Vaping Use: Never used   Substance Use Topics    Alcohol use: Not Currently     Comment: Quit alcohol in 2019- heavier drinking prior to quitting    Drug use: Not Currently     Frequency: 1.0 times per week     Types: Cocaine     Comment: Cocaine- stopped spring 2016       FAMILY HISTORY  Family History   Problem Relation Age of Onset    Cancer Mother         pancreatic    Cancer Father         bone    Diabetes Sister     Asthma Brother     Allergies Brother         MULTIPLE       ALLERGIES  No Known Allergies    MEDICATIONS  Current Medications    lactulose  20 g Oral TID    pantoprazole (PROTONIX) 40 mg injection  40 mg IntraVENous Q12H    levoFLOXacin  500 mg Oral Daily    insulin lispro  0.05 Units/kg SubCUTAneous TID WC    potassium chloride  10 mEq Oral BID    insulin lispro  0-4 Units SubCUTAneous TID WC    insulin lispro  0-4 Units SubCUTAneous Nightly    ipratropium-albuterol  1 ampule Inhalation 4x daily    sodium chloride flush  5-40 mL IntraVENous 2 times per day    [Held by provider] baclofen  10 mg Oral TID    sodium chloride flush  5-40 mL IntraVENous 2 times per day     potassium chloride **OR** potassium alternative oral replacement **OR** potassium chloride, magnesium sulfate, sodium phosphate IVPB **OR** sodium phosphate IVPB **OR** sodium phosphate IVPB, glucose, dextrose bolus **OR** dextrose bolus, glucagon (rDNA), dextrose, glucose, dextrose bolus **OR** dextrose bolus, glucagon (rDNA), dextrose, OLANZapine (ZyPREXA) in sterile water IntraMUSCular, sodium chloride flush, sodium chloride, sodium chloride flush, ondansetron **OR** ondansetron  Home Medications  No current facility-administered medications on file prior to encounter.      Current Outpatient Medications on File Prior to Encounter   Medication Sig Dispense Refill    pantoprazole (PROTONIX) 40 MG tablet Take 40 mg by mouth daily      FEROSUL 325 (65 Fe) MG tablet take 1 tablet by mouth twice a day 60 tablet 5    nadolol (CORGARD) 20 MG tablet take 1 tablet by mouth once daily 30 tablet 2    midodrine (PROAMATINE) 5 MG tablet Take 2 tablets by mouth in the morning and 2 tablets at noon and 2 tablets before bedtime. (Patient not taking: No sig reported) 90 tablet 3    furosemide (LASIX) 20 MG tablet Take 1 tablet by mouth 2 times daily 60 tablet 3    spironolactone (ALDACTONE) 100 MG tablet Take 1 tablet by mouth every evening 30 tablet 1    ondansetron (ZOFRAN-ODT) 4 MG disintegrating tablet dissolve 1 tablet by mouth every 8 hours if needed for nausea and vomiting (Patient not taking: No sig reported) 10 tablet 0    lactulose (CHRONULAC) 10 GM/15ML solution take 30 milliliters by mouth three times a day 473 mL 1    FLUoxetine (PROZAC) 20 MG capsule Take 1 capsule by mouth daily 30 capsule 3    atorvastatin (LIPITOR) 20 MG tablet Take 1 tablet by mouth nightly 30 tablet 3       Data         /61   Pulse 93   Temp 99.9 °F (37.7 °C) (Temporal)   Resp 18   Ht 5' 10\" (1.778 m)   Wt 220 lb 7.4 oz (100 kg)   SpO2 96%   BMI 31.63 kg/m²     Wt Readings from Last 3 Encounters:   01/04/23 220 lb 7.4 oz (100 kg)   12/28/22 197 lb 5 oz (89.5 kg)   12/16/22 214 lb 15.2 oz (97.5 kg)        Code Status: Full Code     ADVANCED CARE PLANNING:  Patient has capacity for medical decisions: yes  [address] :  Allegedly filled out by the patient, naming his ex-wife, Iveth Macias as his healthcare decision-maker but not present in the chart  Living Will: not asked     Personal, Social, and Family History  Marital Status:   Living situation: Alone at home with his dog  Does patient understand diagnosis/treatment? yes  Does caregiver understand diagnosis/treatment? not asked    Assessment        REVIEW OF SYSTEMS  CONSTITUTIONAL:  negative for fevers, chills, sweats, fatigue, weight loss  HEENT:  negative for vision, hearing changes, runny nose, throat pain  RESPIRATORY:  negative for shortness of breath, cough, congestion, wheezing  CARDIOVASCULAR:  negative for chest pain, palpitations  GASTROINTESTINAL: Reports having frequent bowel movements.   Negative for nausea, vomiting, constipation, change in bowel habits, abdominal pain   GENITOURINARY:  negative for difficulty of urination, burning with urination, frequency   INTEGUMENT:  negative for rash, skin lesions, easy bruising   HEMATOLOGIC/LYMPHATIC:  negative for swelling/edema   ALLERGIC/IMMUNOLOGIC:  negative for urticaria , itching  ENDOCRINE:  negative increase in drinking, increase in urination, hot or cold intolerance  MUSCULOSKELETAL:  negative joint pains, muscle aches, swelling of joints  NEUROLOGICAL:  negative for headaches, dizziness, lightheadedness, numbness, pain, tingling extremities  BEHAVIOR/PSYCH:  negative for depression, anxiety    PHYSICAL ASSESSMENT:  General Appearance: alert, well appearing, and in no acute distress  Mental status: oriented to person, place, and time  Head: normocephalic, atraumatic  Eye: no icterus, redness, pupils equal and reactive, extraocular eye movements intact, conjunctiva clear  Ear: normal external ear, no discharge, hearing intact  Nose: no drainage noted  Mouth: mucous membranes moist  Neck: supple, no carotid bruits, thyroid not palpable  Lungs: Bilateral equal air entry, clear to ausculation, no wheezing, rales or rhonchi, normal effort  Cardiovascular: normal rate, regular rhythm, no murmur, gallop, rub  Abdomen: Soft, nontender, + distended, + ascites   Neurologic: Following commands, spontaneously moving all 4 extremities  Skin: No gross lesions, rashes, bruising or bleeding on exposed skin area  Extremities: peripheral pulses palpable, no pedal edema  Psych: Somewhat confused affect    Palliative Performance Scale:  ___100% Full ambulation; normal activity/No disease; full self-care; normal intake; LOC full  ___90% full ambulation; normal activity/some disease; full self-care; normal intake; LOC full  _x_80% ambulation full; normal activity with effort/some disease; full self-care; normal/reduced intake; LOC full  ___70% ambulation reduced; cannot do normal work/some disease; full self-care; normal/reduce intake; LOC full  ___60%  Ambulation reduced; Significant disease; Can't do hobbies/housework; intake normal or reduced; occasional assist; LOC full/confusion  ___50%  Mainly sit/lie; Extensive disease; Can't do any work; Considerable assist; intake normal or reduced; LOC full/confusion  ___40%  Mainly in bed; Extensive disease; Mainly assist; intake normal or reduced; LOC full/confusion   ___30%  Bed Bound; Extensive disease; Total care; intake reduced; LOC full/confusion  ___20%  Bed Bound; Extensive disease; Total care; intake minimal; Drowsy/coma  ___10%  Bed Bound; Extensive disease; Total care; Mouth care only; Drowsy/coma  ___0       Death    Principle Problem/Diagnosis:  Active Problems:    Ascites due to alcoholic cirrhosis (HCC)    Shortness of breath    Portal hypertension (HCC)    Esophageal varices with bleeding (HCC)    Other abnormalities of gait and mobility    S/P TIPS (transjugular intrahepatic portosystemic shunt)    Acute blood loss anemia    Near syncope    Bleeding gastric varices    Hepatic encephalopathy    Periumbilical abdominal pain    Lezama's esophagus with dysplasia    Encephalopathy acute    Acute hyperactive alcohol withdrawal delirium (HCC)    Decompensation of cirrhosis of liver (HCC)    DDD (degenerative disc disease), lumbar    Acute upper GI hemorrhage    Gastrointestinal hemorrhage with melena    Mastoid disorder, bilateral  Resolved Problems:    * No resolved hospital problems. *      Please call with any palliative questions or concerns. Palliative Care Team is available via perfect serve or via phone. This note has been dictated by dragon, typing errors may be a possibility.   The total time I spent in seeing the patient, discussing goals of care, advanced directives, code status, greater than 50% time in counseling and other major issues was more than 60 minutes      Electronically signed by      Liz Gray DO  Hospice/Palliative Care Fellow  [place], [place]  1/4/2023 12:20 PM  Palliative care office: [phone]      ATTENDING ATTESTATION:    I have discussed the care of Mary Colon, including pertinent history and exam findings, with the resident/fellow/NP. I have seen and examined the patient and the key elements of all parts of the encounter have been performed by me. I agree with the assessment, plan and orders as documented by the fellow/resident/NP, after I modified the exam findings and the plan of treatments and the final version is my approved version of the assessment. Additional Comments: The patient was seen today along with fellow Dr. Manda Torres  The patient was lying comfortably in the bed alert, awake, somewhat confused but was able to answer some of the questions  Patient told that he lives alone with his dog  Patient was explained the significance of POA paperwork for health and he stated that he to continue his ex-wife making healthcare decisions for him if anytime he is not able to do so  Patient stated he does not have POA paperwork for health at home but said that his ex wife will most likely be having them  Patient's current medical conditions were discussed with him and it seems that he does not clearly understand the severity of his illness  Patient did say that he had drank beer in November  We called patient's ex-wife Karie Marte but she did not  her phone, we will continue to try to contact her  We ordered spiritual care consult for completing POA paperwork for health  We will follow-up with the POA paperwork for health  We will continue to provide comfort and support to the patient and family        This note has been dictated by dragon, typing errors may be a possibility.   The total time I spent in seeing the patient, discussing goals of care, advanced directives, code status, greater than 50% time in counseling and other major issues was more than 60 minutes      Electronically signed by Jessy Adkins MD on 1/4/2023 at 2:21 PM

## 2023-01-04 NOTE — ACP (ADVANCE CARE PLANNING)
Advance Care Planning     Advance Care Planning (ACP) Physician/NP/PA (Provider) Mario Alberto Shaffer      Date of ACP Conversation: 1/4/2023    Conversation Conducted with:   Patient with [address]:  Current Designated Health Care Decision Maker:    Primary Decision Maker: Kevin Vines - Other - [phone], per the patient. No paperwork currently in chart. Care Preferences:    Hospitalization: \"If your health worsens and it becomes clear that your chance of recovery is unlikely, what would your preference be regarding hospitalization? \"  Currently hospitalized    Ventilation: \"If you were in your present state of health and suddenly became very ill and were unable to breathe on your own, what would your preference be about the use of a ventilator (breathing machine) if it was available to you? \"    FULL CODE    \"If your health worsens and it becomes clear that your chance of recovery is unlikely, what would your preference be about the use of a ventilator (breathing machine) if it was available to you? \"   FULL CODE    Resuscitation:  \"CPR works best to restart the heart when there is a sudden event, like a heart attack, in someone who is otherwise healthy. Unfortunately, CPR does not typically restart the heart for people who have serious health conditions or who are very sick. \"    \"In the event your heart stopped as a result of an underlying serious health condition, would you want attempts to be made to restart your heart (answer \"yes\" for attempt to resuscitate) or would you prefer a natural death (answer \"no\" for do not attempt to resuscitate)? \"   FULL CODE    Conversation Outcomes / Follow-Up Plan:   Need to obtain ACP paperwork from ex-wife, if she is able to bring in. She did not answer my phone call. Will involve spiritual care as well.     Length of Voluntary ACP Conversation in minutes:  16 minutes    Liz Gray, DO  Hospice and Palliative Care Medicine Fellow

## 2023-01-04 NOTE — PROGRESS NOTES
[facility] -   Infectious diseases evaluation  admission date 12/28/2022    reason for consultation:   Possible SBP    Impression :   Current:  Bilateral pleural effusions   bilateral atelectasis, vs pneumonia   small pericardial effusion  Cirrhosis post TIPS, with ascites and esophageal varices -treated hepatitis C  Elevated CRP  1/3 rectal bleed - EGD    Other:  History of esophageal cancer/Lezama's esophagus  Discussion / summary of stay / plan of care     Recommendations   Treating bilateral lower lobe consolidations possibly pneumonia,  Post cefepime / vanco since 1/1,  Levaquin 5 more days 1/3/23 until 1/7/23  Due to continuous rectal bleed, EGD today 1/3 no bleed  Disc w medicine    Infection Control Recommendations   [place] Precautions  Contact Isolation     Antimicrobial Stewardship Recommendations   Simplification of therapy  Targeted therapy      History of Present Illness:   Initial history:  Daniela Bernal is a 60y.o.-year-old male presented to the hospital 12/29 because of weakness fatigue recurrent upper GI bleed with melena and what seems to be anemia at the time. Admitted for GI work-up, he was having bloody stools/melena. Initially seen at [facility] started on octreotide, transferred to [facility] for reevaluation of the hips    CT abdomen showed rectal spasm otherwise with fluid in the rectum. TIPS was apparently patent, enthesitis    Ascites sample 12/28 was culture negative, And the number of WBCs was not suggestive of infection. A repeat paracentesis was performed 1/3, cultures and studies pending    CT abdomen and pelvis suggested a possible bilateral lower lobe atelectasis, upon review they could potentially be pneumonia.   Infectious disease consulted to manage antibiotics, the patient has been started on broad antibiotics on 1/1    Interval changes  1/4/2023   Patient Vitals for the past 8 hrs:   BP Temp Temp src Pulse Resp SpO2 01/04/23 1122 104/61 99.9 °F (37.7 °C) Temporal 93 18 96 %   01/04/23 0817 -- -- -- 94 29 94 %   01/04/23 0715 105/63 98.5 °F (36.9 °C) Temporal 85 26 95 %     1/3/23  Paracenthesis 1/3 2200ml yellow clear fluid  WBC 90 and cx pend - doubt infected    Had EGD 1/3 and shows no bleed, grade 1-2 varices distal esophagus and few AVMs and mild diffuse portal HTN    Summary of relevant labs:  Labs:  Procalcitonin0.15 High    CRP31.8 High    ALT32U/L YCI086 High    Creatinine0.51 Low    WBC 5.2  Micro:  Ascites  cx neg 12/28  Imaging:  CXR 1/3/23  Increasing vascular congestion with small pleural effusions, suggestive of   developing edema. Echo  Left ventricle is normal in size. Global left ventricular systolic function   is normal. Calculated ejection fraction 60% by Heart Model. No significant valvular regurgitation or stenosis seen. Moderate circumferential pericardial effusion seen. Measures 2.2cm   anteriorly and 2.26cm posteriorly. Clot-like structure noted in anterior pericardial space. No signs of tamponade. Consider clinical correlation. CTAP  1/3/23  There is a short segment of narrowing of the rectum near the rectosigmoid   junction compared to the recent exam and may represent spasm rather than   underlying mass. Fluid-filled loops of small bowel and colon suggesting   diarrhea with enteritis or colitis. Evaluation of true wall thickening is   limited by the diffuse edematous state. 2.  Cirrhotic liver with tips, ascites, and sequela of portal hypertension. 3.  Thickening of the gastric mucosal folds and into the distal esophagus. Correlate with prior history of malignancy. 4.  Calcified thrombosed splenic artery aneurysm in the left upper quadrant   measuring up to 3.1 cm but stable from previous exams. 5.  Pleural effusions and pericardial effusion are redemonstrated. The   opacified portions of the lower lungs appears to be from compressive   atelectasis.      I have personally reviewed the past medical history, past surgical history, medications, social history, and family history, and I haveupdated the database accordingly. Allergies:   Patient has no known allergies. Review of Systems:     Review of Systems   Constitutional:  Positive for activity change. HENT:  Negative for congestion and rhinorrhea. Eyes:  Negative for discharge and redness. Respiratory:  Positive for cough and shortness of breath. Negative for apnea. Cardiovascular:  Positive for leg swelling. Negative for chest pain. Gastrointestinal:  Positive for abdominal distention. Negative for abdominal pain. Endocrine: Negative for cold intolerance and polyphagia. Genitourinary:  Negative for difficulty urinating, hematuria and urgency. Musculoskeletal:  Negative for arthralgias. Skin:  Negative for color change. Allergic/Immunologic: Negative for immunocompromised state. Neurological:  Negative for dizziness. Hematological:  Negative for adenopathy. Psychiatric/Behavioral:  Negative for agitation and confusion. Physical Examination :       Physical Exam  Constitutional:       Appearance: Normal appearance. He is not ill-appearing. HENT:      Head: Normocephalic and atraumatic. Nose: Nose normal. No congestion. Mouth/Throat:      Mouth: Mucous membranes are moist.      Pharynx: No oropharyngeal exudate. Eyes:      General: No scleral icterus. Conjunctiva/sclera: Conjunctivae normal.   Cardiovascular:      Rate and Rhythm: Normal rate and regular rhythm. Heart sounds: Normal heart sounds. No murmur heard. Pulmonary:      Breath sounds: No stridor. No rhonchi. Abdominal:      General: There is distension. Palpations: There is no mass. Tenderness: There is no abdominal tenderness. Hernia: No hernia is present. Genitourinary:     Rectum: Guaiac result negative.       Comments: Urine dark yellow  Musculoskeletal:         General: No swelling or deformity. Cervical back: Neck supple. No rigidity. Skin:     Capillary Refill: Capillary refill takes less than 2 seconds. Coloration: Skin is not jaundiced. Findings: Bruising present. Neurological:      Mental Status: He is alert. Cranial Nerves: No cranial nerve deficit. Psychiatric:         Mood and Affect: Mood normal.         Thought Content:  Thought content normal.       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 Gerry Andino, orthopedic, every 3-4 months, gets steroid injection    Lezama esophagus     BPH (benign prostatic hypertrophy)     Cholelithiasis     Cirrhosis ([address]     COVID-19 12/2020    pt reports he had a positive test while at [facility] in 2020, was asymptomatic    COVID-19 vaccine series completed 5/20/2021, 6/22/2021    Moderna 5/20/2021, 6/22/2021    DDD (degenerative disc disease), lumbar     Depression     Esophageal cancer ([address]     INVASIVE ADENOCARCINOMA ARISING IN TUBULAR ADENOMA WITH HIGH GRADE DYSPLASIA, ASSOCIATED WITH FOCAL INTESTINAL METAPLASIA     Esophageal varices ([address]     Fatty liver     GERD (gastroesophageal reflux disease)     GI bleed     Hep C w/o coma, chronic ([address]     History of alcohol abuse     6-12 beers a day; quit drinking 2019    History of blood transfusion     History of colon polyps 2016    History of tobacco abuse     [place] (hard of hearing)     Hyperlipidemia     Hypertension     Hyponatremia 07/20/2016    Hypotension 12/20/2021    Mastoid disorder, bilateral 12/31/2022    biLateral mastoid effusion    PONV (postoperative nausea and vomiting)     Port-A-Cath in place     right upper chest    Portal hypertension ([address]     Sciatica     Secondary esophageal varices ([address] 06/07/2022    Shortness of breath     Spinal stenosis     Stomach ulcer hx of    Thrombocytopenia ([address] 12/23/2020    Tubular adenoma of colon 2016, 2018    Vitamin D deficiency     Wears glasses        Past Surgical  History:     Past Surgical History:   Procedure Laterality Date    BUNIONECTOMY      twice on right side    BUNIONECTOMY Left     CARPAL TUNNEL RELEASE Right     COLONOSCOPY      at age 35    COLONOSCOPY  10/05/2016    polyps-pathology tubular adenoma, and abnormal looking mucosa right colon-pathology-tubular adenoma    COLONOSCOPY N/A 03/30/2018    COLONOSCOPY POLYPECTOMY COLD BIOPSY performed by Tanvi Lombardi MD at [address],[facility]  03/30/2018    Small polyp in the sigmoid colon and excised with biopsy forceps--tubular adenoma    COLONOSCOPY N/A 04/16/2022    COLONOSCOPY POLYPECTOMY performed by Tanvi Lombardi MD at [facility], DIAGNOSTIC      EGD    ESOPHAGOGASTRODUODENOSCOPY  12/29/2022    ESOPHAGOGASTRODUODENOSCOPY N/A 01/03/2023    IR PORT PLACEMENT EQUAL OR GREATER THAN 5 YEARS  04/19/2021    IR PORT PLACEMENT EQUAL OR GREATER THAN 5 YEARS 4/19/2021 [facility]    IR TIPS INSERTION  12/01/2022    IR TIPS INSERTION 12/1/2022 Nydia Webb MD [facility]    KNEE SURGERY Left     cyst removed    NASAL SEPTUM SURGERY      NERVE BLOCK Right 11/23/2020    NERVE BLOCK RIGHT CERVICAL STEROID INJECTION  C3-C6 performed by Ashkan Alvarado MD at [address] [facility]  01/04/2016    steroid injection C7 T1    OTHER SURGICAL HISTORY  11/21/2016    Bilateral Lumbar CACHORRO L4-L5 injections    OTHER SURGICAL HISTORY  12/19/2016    lumbar steroid injection    OTHER SURGICAL HISTORY  09/28/2018    BILATERAL L5 CACHORRO (N/A Back)    OTHER SURGICAL HISTORY Right 11/23/2020    cervical injection    PAIN MANAGEMENT PROCEDURE Left 07/09/2020    EPIDURAL STEROID INJECTION LEFT L4 L5 performed by Ashkan Alvarado MD at [facility] Left 07/20/2020    LEFT L4 L5 EPIDURAL STEROID INJECTION performed by Rafael Jaimes MD at [facility] Bilateral 08/17/2020    LUMBAR FACET BILATERAL L2-L5 performed by Rafael Jaimes MD at [facility] Bilateral 12/07/2020    NERVE BLOCK BILATERAL LUMBAR MEDIAL BRANCH L2-L5 performed by Rafael Jaimes MD at [address] [facility] [address] NEUROPLASTY &/TRANSPOS MEDIAN NRV CARPAL TUNNE Right 08/29/2017    CARPAL TUNNEL RELEASE RIGHT performed by Yvette Bates MD at [address] &/TRANSPOS MEDIAN NRV CARPAL TUNNE Left 10/31/2017    CARPAL TUNNEL RELEASE performed by Yvette Bates MD at [facility] [address] AA&/STRD TFRML EPI LUMBAR/SACRAL 1 LEVEL Bilateral 09/06/2018    BILATERAL L5 CACHORRO performed by Rafael Jaimes MD at [facility] [address] AA&/STRD TFRML EPI LUMBAR/SACRAL 1 LEVEL N/A 09/28/2018    BILATERAL L5 CACHORRO performed by Rafael Jaimes MD at [facility]. [address] 12/29/2020    EGD BIOPSY performed by Sparkle Kaufman MD at [address] [place] [address] 02/02/2021    EGD BIOPSY and spot marking performed by Ana Laura Flores MD at [address] [place] [address] N/A 02/12/2021    ENDOSCOPIC ULTRASOUND, EGD performed by Missy Mar MD at [facility] 1  02/12/2021    EGD DIAGNOSTIC ONLY performed by Missy Mar MD at [facility] 1 08/31/2021    EGD BIOPSY performed by Ana Laura Flores MD at [address] [place] [address] 01/21/2022    EGD BIOPSY performed by Ana Laura Flores MD at [address] [place] [address] 04/15/2022    EGD ESOPHAGOGASTRODUODENOSCOPY performed by Sparkle Kaufman MD at [facility]. [address] 06/06/2022    EGD BAND LIGATION performed by Servando Rojas MD at [address] [place] [address] N/A 06/09/2022    EGD ESOPHAGOGASTRODUODENOSCOPY performed by Glenwood Frankel, MD at [address] [place] [address] N/A 2022    EGD ESOPHAGOGASTRODUODENOSCOPY ENDOSCOPIC APC AT GE JUNCTION performed by Callum Marshall MD at [address] [place] [address] N/A 10/19/2022    EGD BIOPSY performed by Fina Hong MD at [address] [place] [address] N/A 10/31/2022    EGD with APC performed by Fina Hong MD at [address] [place] [address]  2022    EGD ESOPHAGOGASTRODUODENOSCOPY performed by Monik Quinteros MD at [address] [place] [address] 1/3/2023    EGD ESOPHAGOGASTRODUODENOSCOPY performed by Renetta Cassidy MD at [address] [place] [address]         Medications:      lactulose  10 g Oral TID    carvedilol  3.125 mg Oral BID WC    [START ON 2023] pantoprazole  40 mg Oral QAM AC    levoFLOXacin  500 mg Oral Daily    insulin lispro  0.05 Units/kg SubCUTAneous TID WC    potassium chloride  10 mEq Oral BID    insulin lispro  0-4 Units SubCUTAneous TID WC    insulin lispro  0-4 Units SubCUTAneous Nightly    ipratropium-albuterol  1 ampule Inhalation 4x daily    sodium chloride flush  5-40 mL IntraVENous 2 times per day    [Held by provider] baclofen  10 mg Oral TID    sodium chloride flush  5-40 mL IntraVENous 2 times per day       Social History:     Social History     Socioeconomic History    Marital status: Single     Spouse name: Not on file    Number of children: Not on file    Years of education: Not on file    Highest education level: Not on file   Occupational History    Not on file   Tobacco Use    Smoking status: Former     Packs/day: 1.00     Years: 45.00     Pack years: 45.00     Types: Cigarettes     Quit date: 2017     Years since quittin.9    Smokeless tobacco: Never   Vaping Use    Vaping Use: Never used   Substance and Sexual Activity    Alcohol use: Not Currently     Comment: Quit alcohol in 2019- heavier drinking prior to quitting Drug use: Not Currently     Frequency: 1.0 times per week     Types: Cocaine     Comment: Cocaine- stopped spring 2016    Sexual activity: Yes     Partners: Female   Other Topics Concern    Not on file   Social History Narrative     in the past, retired     Social Determinants of Health     Financial Resource Strain: Low Risk     Difficulty of Paying Living Expenses: Not hard at all   Food Insecurity: No Food Insecurity    Worried About [address] in the Last Year: Never true    [address] [employer] in the Last Year: Never true   Transportation Needs: Not on file   Physical Activity: Inactive    Days of Exercise per Week: 0 days    Minutes of Exercise per Session: 0 min   Stress: Not on file   Social Connections: Not on file   Intimate Partner Violence: Not on file   Housing Stability: Not on file       Family History:     Family History   Problem Relation Age of Onset    Cancer Mother         pancreatic    Cancer Father         bone    Diabetes Sister     Asthma Brother     Allergies Brother         MULTIPLE      Medical Decision Making:   I have independently reviewed/ordered the following labs:    CBC with Differential:   Recent Labs     01/03/23  0557 01/03/23  1652 01/03/23  2129 01/04/23  0304   WBC 4.8  --   --  5.2   HGB 7.2*   < > 8.0* 7.8*   HCT 24.9*   < > 29.1* 26.8*   PLT 70*  --   --  94*   LYMPHOPCT 4*  --   --  8*   MONOPCT 10*  --   --  16*    < > = values in this interval not displayed. BMP:  Recent Labs     01/02/23  1406 01/03/23  0557 01/04/23  0304   NA  --  140 135   K  --  3.3* 4.0   CL  --  111* 111*   CO2  --  21 18*   BUN  --  4* 5*   CREATININE  --  0.40* 0.51*   MG 1.4* 2.1  --        Hepatic Function Panel:   Recent Labs     01/03/23  0557 01/04/23  0304   PROT 4.0* 3.9*   LABALBU 2.3* 2.0*   BILIDIR  --  2.8*   IBILI  --  1.8*   BILITOT 5.0* 4.6*   ALKPHOS 95 122   ALT 30 32   * 127*       No results for input(s): RPR in the last 72 hours.   No results for input(s): HIV in the last 72 hours. No results for input(s): BC in the last 72 hours. Lab Results   Component Value Date/Time    CREATININE 0.51 01/04/2023 03:04 AM    GLUCOSE 89 01/04/2023 03:04 AM    GLUCOSE 65 04/19/2012 11:30 AM       Detailed results: Thank you for allowing us to participate in the care of this patient. Please call with questions. This note is created with the assistance of a speech recognition program.  While intending to generate adocument that actually reflects the content of the visit, the document can still have some errors including those of syntax and sound a like substitutions which may escape proof reading. It such instances, actual meaningcan be extrapolated by contextual diversion. [id number] [facility]  Office: [phone]  Perfect serve / office [phone]        I have discussed the care of the patient, including pertinent history and exam findings,  with the resident. I have seen and examined the patient and the key elements of all parts of the encounter have been performed by me. I agree with the assessment, plan and orders as documented by the resident.     Sangita Oconnor, Infectious Diseases

## 2023-01-04 NOTE — PROGRESS NOTES
Comprehensive Nutrition Assessment    Type and Reason for Visit:  Initial    Nutrition Recommendations/Plan:   Continue with diet and ONS as ordered. Continue to monitor weights, intakes, labs and follow up. Malnutrition Assessment:  Malnutrition Status:  No malnutrition (01/04/23 1527)    Context:  Acute Illness     Findings of the 6 clinical characteristics of malnutrition:  Energy Intake:  No significant decrease in energy intake  Weight Loss:  No significant weight loss     Body Fat Loss:  No significant body fat loss     Muscle Mass Loss:  No significant muscle mass loss    Fluid Accumulation:  No significant fluid accumulation     Strength:  Not Performed    Nutrition Assessment:    Chart reviewed today for length of stay/admission related to GI bleed with symptomatic anemia. Patient recieving regular, low Na diet and Clear liquid ONS TID. Tolerating diet. LBM 1/2. Noted weight gain since admission, likely related to intial admission weight was stated. Recommend to continue with current plan of care and patient appears stable nutritionally. Will continue to monitor. Nutrition Related Findings:    Labs/Meds reviewed Wound Type: None       Current Nutrition Intake & Therapies:    Average Meal Intake: %  Average Supplements Intake: %  ADULT DIET; Regular; Low Sodium (2 gm)  ADULT ORAL NUTRITION SUPPLEMENT; Breakfast, Lunch, Dinner; Clear Liquid Oral Supplement    Anthropometric Measures:  Height: 5' 10\" (177.8 cm)  Ideal Body Weight (IBW): 166 lbs (75 kg)    Admission Body Weight: 190 lb 11 oz (86.5 kg)  Current Body Weight: 220 lb 7.4 oz (100 kg),   IBW. Weight Source: Bed Scale  Current BMI (kg/m2): 31.6  Weight Adjustment For: No Adjustment  BMI Categories: Obese Class 1 (BMI 30.0-34. 9)    Estimated Daily Nutrient Needs:  Energy Requirements Based On: Formula  Weight Used for Energy Requirements: Current  Energy (kcal/day): 1800-2000kcal/day  Weight Used for Protein Requirements: Ideal  Protein (g/day): 90-110gmsPRO/day  Fluid (ml/day): per MD    Nutrition Diagnosis:   No nutrition diagnosis at this time related to   as evidenced by      Nutrition Interventions:   Food and/or Nutrient Delivery: Continue Current Diet, Continue Oral Nutrition Supplement  Nutrition Education/Counseling: No recommendation at this time  Coordination of Nutrition Care: Continue to monitor while inpatient       Goals:     Goals: Meet at least 75% of estimated needs, within 7 days, PO intake 50% or greater       Nutrition Monitoring and Evaluation:   Behavioral-Environmental Outcomes: None Identified  Food/Nutrient Intake Outcomes: Food and Nutrient Intake, Supplement Intake  Physical Signs/Symptoms Outcomes: Biochemical Data, Skin, Weight, GI Status, Fluid Status or Edema    Discharge Planning:    Continue Oral Nutrition Supplement, Continue current diet     Markus Jorge, [address]: [phone]

## 2023-01-04 NOTE — PLAN OF CARE
Problem: Respiratory - Adult  Goal: Achieves optimal ventilation and oxygenation  1/4/2023 0820 by Leta Neal RCP  Outcome: Progressing   PROVIDE ADEQUATE OXYGENATION WITH ACCEPTABLE SP02/ABG'S    [x]  IDENTIFY APPROPRIATE OXYGEN THERAPY  [x]   MONITOR SP02/ABG'S AS NEEDED   [x]   PATIENT EDUCATION AS NEEDED   BRONCHOSPASM/BRONCHOCONSTRICTION     [x]         IMPROVE AERATION/BREATH SOUNDS  [x]   ADMINISTER BRONCHODILATOR THERAPY AS APPROPRIATE  [x]   ASSESS BREATH SOUNDS  []   IMPLEMENT AEROSOL/MDI PROTOCOL  [x]   PATIENT EDUCATION AS NEEDED

## 2023-01-04 NOTE — PROGRESS NOTES
[facility]  Office: [address], DO, Darling Oriana, DO, Balta [place], DO, Saundra Hassan Blood, DO, Junito Maxwell MD, Azar Do MD, Eugene Howard MD, Yancy Calvert MD,  Cameron Vines MD, Brant Hogan MD, Triston Reeder, DO, Maryellen Jordan MD,  Leslie Proctor MD, Wilfrid East MD, Keith Vlilatoro DO, Zhao Arenas MD, Elissa Larson MD, Herbert Del Toro, DO, Naun Quispe MD, Roxanne Donis MD, Bladimir Rogers MD, Jana Lamb MD, Tamara Seymour DO, Maryuri Donald MD, Akanksha Monzon MD, Paco Singer, CNP,  Yana Villanueva CNP, Mookie Lin, CNP, Josse Alvarez, CNP,  Jocelyn Rodríguez, [facility], [place], CNP, Andrzej Hampton, CNP, Luz Iyer, CNP, Laureen Kay, CNP, Dontrell Lopez, CNP, ARTI DuboseC, Swetha Fonseca, CNS, Lito Vines, CNP, Sandee Sanchez, [address] [place] [address]    Progress Note    1/4/2023    2:15 PM    Name:   Chata Mares  MRN:     [id number]     [place]:      [de-identified]   Room:   [id number] Day:  7  Admit Date:  12/28/2022 11:30 PM    PCP:   VASU Gardner  Code Status:  Full Code    Subjective:     C/C:  bleeding   Interval History Status:worse     Pt feels more \"foggy\" today. Says he is having a harder time thinking. He is not sleeping well and has not had any bowel movements. He is irritated with dyson says he has not been out of bed    Brief History:     cirrhosis    Review of Systems:     Constitutional:  negative for chills, fevers, sweats feels foggy  and weak   Respiratory:  negative for cough, dyspnea on exertion, shortness of breath, wheezing  Cardiovascular:  negative for chest pain, chest pressure/discomfort, lower extremity edema, palpitations  Gastrointestinal:  negative for abdominal pain, constipation, diarrhea, nausea, vomiting +bloody bm  Neurological:  negative for dizziness, headache    Medications:      Allergies:  No Known Allergies    Current Meds:   Scheduled Meds:    lactulose  10 g Oral TID    carvedilol  3.125 mg Oral BID     [START ON 1/5/2023] pantoprazole  40 mg Oral QAM AC    levoFLOXacin  500 mg Oral Daily    insulin lispro  0.05 Units/kg SubCUTAneous TID     potassium chloride  10 mEq Oral BID    insulin lispro  0-4 Units SubCUTAneous TID WC    insulin lispro  0-4 Units SubCUTAneous Nightly    ipratropium-albuterol  1 ampule Inhalation 4x daily    sodium chloride flush  5-40 mL IntraVENous 2 times per day    [Held by provider] baclofen  10 mg Oral TID    sodium chloride flush  5-40 mL IntraVENous 2 times per day     Continuous Infusions:    dextrose      dextrose      sodium chloride       PRN Meds: potassium chloride **OR** potassium alternative oral replacement **OR** potassium chloride, magnesium sulfate, sodium phosphate IVPB **OR** sodium phosphate IVPB **OR** sodium phosphate IVPB, glucose, dextrose bolus **OR** dextrose bolus, glucagon (rDNA), dextrose, glucose, dextrose bolus **OR** dextrose bolus, glucagon (rDNA), dextrose, sodium chloride flush, sodium chloride, sodium chloride flush, ondansetron **OR** ondansetron    Data:     Past Medical History:   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 ([facility] Alcoholic cirrhosis of liver with ascites ([facility] Anemia, Anxiety, Arthritis, Back pain, chronic, Lezama esophagus, BPH (benign prostatic hypertrophy), Cholelithiasis, Cirrhosis ([facility] COVID-19, COVID-19 vaccine series completed, DDD (degenerative disc disease), lumbar, Depression, Esophageal cancer ([facility] Esophageal varices ([facility] Fatty liver, GERD (gastroesophageal reflux disease), GI bleed, Hep C w/o coma, chronic ([facility] History of alcohol abuse, History of blood transfusion, History of colon polyps, History of tobacco abuse, [place] (hard of hearing), Hyperlipidemia, Hypertension, Hyponatremia, Hypotension, Mastoid disorder, bilateral, PONV (postoperative nausea and vomiting), Port-A-Cath in place, Portal hypertension ([facility] Sciatica, Secondary esophageal varices (HCC), Shortness of breath, Spinal stenosis, Stomach ulcer, Thrombocytopenia ([facility] Tubular adenoma of colon, Vitamin D deficiency, and Wears glasses. Social History:   reports that he quit smoking about 5 years ago. His smoking use included cigarettes. He has a 45.00 pack-year smoking history. He has never used smokeless tobacco. He reports that he does not currently use alcohol. He reports that he does not currently use drugs after having used the following drugs: Cocaine. Frequency: 1.00 time per week. Family History:   Family History   Problem Relation Age of Onset    Cancer Mother         pancreatic    Cancer Father         bone    Diabetes Sister     Asthma Brother     Allergies Brother         MULTIPLE       Vitals:  /61   Pulse 93   Temp 99.9 °F (37.7 °C) (Temporal)   Resp 18   Ht 5' 10\" (1.778 m)   Wt 220 lb 7.4 oz (100 kg)   SpO2 96%   BMI 31.63 kg/m²   Temp (24hrs), Av.2 °F (36.8 °C), Min:97.2 °F (36.2 °C), Max:99.9 °F (37.7 °C)    Recent Labs     23  1730 23  0803 23  1152   POCGLU 97 110 87 87       I/O (24Hr):     Intake/Output Summary (Last 24 hours) at 2023 1415  Last data filed at 2023 0644  Gross per 24 hour   Intake 293.9 ml   Output 1025 ml   Net -731.1 ml       Labs:  Hematology:  Recent Labs     23  1458 23  1619 23  1620 23  0959 23  1055 23  1648 23  0557 23  1652 23  2129 23  0304   WBC  --   --   --  4.2  --   --  4.8  --   --  5.2   RBC  --   --   --  3.35*  --   --  2.84*  --   --  3.01*   HGB  --   --    < > 8.5*  --    < > 7.2* 7.6* 8.0* 7.8*   HCT  --   --    < > 29.9*  --    < > 24.9* 25.2* 29.1* 26.8*   MCV  --   --   --  89.3  --   --  87.7  --   --  89.0   MCH  --   --   --  25.4  --   --  25.4  --   --  25.9   MCHC  --   --   --  28.4  --   --  28.9  --   --  29.1   RDW  --   --   --  20.5*  -- --  20.7*  --   --  20.5*   PLT  --   --   --  70*  --   --  70*  --   --  94*   MPV  --   --   --  9.7  --   --  10.8  --   --  10.0   CRP 26.9* 26.9*  --  31.8*  --   --   --   --   --   --    INR  --   --   --   --  1.4  --  1.6  --   --   --    DDIMER  --   --   --   --  1.79  --   --   --   --   --     < > = values in this interval not displayed. Chemistry:  Recent Labs     01/01/23  1458 01/01/23  1619 01/02/23  0959 01/02/23  1406 01/02/23  1812 01/02/23  2048 01/03/23  0325 01/03/23  0557 01/04/23  0304      < > 139  --   --   --   --  140 135   K 3.3*   < > 3.2*  --   --   --   --  3.3* 4.0   *   < > 113*  --   --   --   --  111* 111*   CO2 17*   < > 18*  --   --   --   --  21 18*   GLUCOSE 260*   < > 141*  --   --   --   --  102* 89   BUN 6*   < > 4*  --   --   --   --  4* 5*   CREATININE 0.55*   < > 0.40*  --   --   --   --  0.40* 0.51*   MG 1.9   < > 1.7 1.4*  --   --   --  2.1  --    ANIONGAP 12   < > 8*  --   --   --   --  8* 6*   LABGLOM >60   < > >60  --   --   --   --  >60 >60   CALCIUM 7.6*   < > 7.3*  --   --   --   --  7.5* 7.5*   PHOS  --   --  1.5* 1.4*  --   --   --  1.7*  --    PROBNP 417*  --  563*  --   --   --   --  970  --    LACTACIDWB  --    < > 3.2* 3.4*   < > 2.5* 2.0 1.5  --     < > = values in this interval not displayed.      Recent Labs     01/01/23  1619 01/01/23  1620 01/01/23 2000 01/02/23  0959 01/02/23  1136 01/03/23  0557 01/03/23  0812 01/03/23  1214 01/03/23  1501 01/03/23  1730 01/03/23  2026 01/04/23  0304 01/04/23  0803 01/04/23  1152   PROT 4.2*  --   --  4.6*  --  4.0*  --   --   --   --   --  3.9*  --   --    LABALBU 2.2*  --   --  2.3*  --  2.3*  --   --   --   --   --  2.0*  --   --    LABA1C  --  4.5  --   --   --   --   --   --   --   --   --   --   --   --    TSH 0.19*  --   --   --   --   --   --   --   --   --   --   --   --   --    *  --   --  135*  --  110*  --   --   --   --   --  127*  --   --    ALT 27  --   --  35  --  30  -- --   --   --   --  32  --   --    ALKPHOS 113  --   --  121  --  95  --   --   --   --   --  122  --   --    BILITOT 4.9*  --   --  5.4*  --  5.0*  --   --   --   --   --  4.6*  --   --    BILIDIR  --   --   --   --   --   --   --   --   --   --   --  2.8*  --   --    AMMONIA  --   --   --  38  --  36  --   --   --   --   --   --   --   --    POCGLU  --   --    < >  --    < >  --    < > 96 109 97 110  --  87 87    < > = values in this interval not displayed. ABG:  Lab Results   Component Value Date/Time    FIO2 INFORMATION NOT PROVIDED 12/30/2022 01:35 AM     Lab Results   Component Value Date/Time    SPECIAL RT HAND 5ML 01/01/2023 02:36 PM     Lab Results   Component Value Date/Time    CULTURE NO GROWTH 18 HOURS 01/03/2023 11:20 AM       Radiology:  XR CHEST (SINGLE VIEW FRONTAL)    Result Date: 12/27/2022  1. Minimal bibasilar atelectatic changes with trace left pleural fluid which is slightly decreased from 12/09/2022. XR RADIUS ULNA RIGHT (2 VIEWS)    Result Date: 1/1/2023  No fracture or dislocation. Mild soft tissue swelling     CT HEAD WO CONTRAST    Result Date: 12/31/2022  CT BRAIN: 1. No evidence of acute hemorrhage, large territorial hypodensity, significant mass effect/midline shift, or ventriculomegaly 2. Involutional parenchymal changes. 3. Age-indeterminate (likely chronic) left basal ganglia lacunar infarct. If clinically indicated, can consider further evaluation with MRI if no contraindications. CT CERVICAL SPINE: 1. No acute abnormality of the cervical spine. 2. Multilevel cervical spondylosis, most notable at C5-C6 and C6-C7. No high-grade bony spinal canal stenosis. CT CERVICAL SPINE WO CONTRAST    Result Date: 12/31/2022  CT BRAIN: 1. No evidence of acute hemorrhage, large territorial hypodensity, significant mass effect/midline shift, or ventriculomegaly 2. Involutional parenchymal changes. 3. Age-indeterminate (likely chronic) left basal ganglia lacunar infarct.   If clinically indicated, can consider further evaluation with MRI if no contraindications. CT CERVICAL SPINE: 1. No acute abnormality of the cervical spine. 2. Multilevel cervical spondylosis, most notable at C5-C6 and C6-C7. No high-grade bony spinal canal stenosis. CT ABDOMEN PELVIS W IV CONTRAST Additional Contrast? Radiologist Recommendation    Result Date: 1/3/2023  1. There is a short segment of narrowing of the rectum near the rectosigmoid junction compared to the recent exam and may represent spasm rather than underlying mass. Fluid-filled loops of small bowel and colon suggesting diarrhea with enteritis or colitis. Evaluation of true wall thickening is limited by the diffuse edematous state. 2.  Cirrhotic liver with tips, ascites, and sequela of portal hypertension. 3.  Thickening of the gastric mucosal folds and into the distal esophagus. Correlate with prior history of malignancy. 4.  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 5.  Pleural effusions and pericardial effusion are redemonstrated. The opacified portions of the lower lungs appears to be from compressive atelectasis. CT ABDOMEN PELVIS W IV CONTRAST Additional Contrast? None    Result Date: 12/27/2022  Stable circumferential wall thickening of the distal esophagus and gastroesophageal junction consistent with known esophageal malignancy. No significant change since prior examination. No esophageal obstruction. Cirrhotic liver with associated portal venous hypertension, progressive ascites and stable splenomegaly. TIPS appears in place and patent Cholelithiasis Interval development of bibasal infiltrates and moderate bilateral pleural effusions as well as mild-to-moderate pericardial effusion     IR FLUORO GUIDED NEEDLE PLACEMENT    Result Date: 12/28/2022  Successful ultrasound-guided placement of a right upper extremity midline venous catheter.      [address] LIVER    Result Date: 12/28/2022  1. Cholelithiasis without definite findings of acute cholecystitis. 2. Patent TIPS. Specific velocities recorded above. 3. Small volume abdominal ascites. 4. Hepatic morphologic features typical of cirrhosis. US GALLBLADDER RUQ    Result Date: 12/30/2022  Multiple cholelithiasis but no ultrasound evidence cholecystitis. Nondilated biliary tree. Cirrhosis. Patent TIPS. Ascites. Additional findings, as above. XR CHEST PORTABLE    Result Date: 1/3/2023  Increasing vascular congestion with small pleural effusions, suggestive of developing edema. XR CHEST PORTABLE    Result Date: 1/2/2023  Mild pulmonary vascular congestion without evidence of overt edema     XR CHEST PORTABLE    Result Date: 12/31/2022  Little change from prior study. Cardiomegaly, vascular congestion, effusions, atelectasis and possible superimposed acute airspace disease are again noted. XR CHEST PORTABLE    Result Date: 12/30/2022  Cardiomegaly with probable mild central vascular congestion and similar bilateral pleural effusions with compressive atelectasis; filtrate at either base a consideration. No pulmonary edema. IR US GUIDED PARACENTESIS    Result Date: 12/28/2022  Successful ultrasound-guided therapeutic and diagnostic paracentesis. IR REVISION TIPS    Result Date: 1/2/2023  TIPS stent reassessment showing 6 mm Hg portosystemic shunt; no significant variceal filling demonstrated, but successful left gastric vein embolization performed. TIPS stent angioplasty to 10 mm with excellent flow demonstrated. The findings were discussed with Dr. Vilma Murphy post procedure. MRI BRAIN WO CONTRAST    Result Date: 1/2/2023  No evidence of acute ischemia. New moderate-sized bilateral mastoid effusions. Small old lacune within the left basal ganglia. New low T1 signal within the marrow elements diffusely likely representing a marrow replacement process such as anemia.        Physical Examination:        General appearance:  alert, cooperative and no distress  Mental Status:  oriented to person, place and time and normal affect  Eyes: :+scleral icterus. Lungs:  clear to auscultation bilaterally, normal effort  Heart:  regular rate and rhythm, no murmur  Abdomen:  soft, nontender, nondistended, normal bowel sounds, no masses, +hepatomegaly, +splenomegaly +fluid wave  Extremities:  no edema, redness, tenderness in the calves  Skin:  no gross lesions, rashes, induration    Assessment:        Hospital Problems             Last Modified POA    Ascites due to alcoholic cirrhosis ([address] 66/60/0467 Yes    Shortness of breath 12/29/2022 Yes    Portal hypertension ([address] 12/29/2022 Yes    Esophageal varices with bleeding ([address] 12/29/2022 Yes    Other abnormalities of gait and mobility 12/29/2022 Yes    S/P TIPS (transjugular intrahepatic portosystemic shunt) 12/29/2022 Yes    Acute blood loss anemia 12/29/2022 Yes    Near syncope 12/29/2022 Yes    Bleeding gastric varices 12/29/2022 Yes    Hepatic encephalopathy 46/91/8999 Yes    Periumbilical abdominal pain 12/29/2022 Yes    Lezama's esophagus with dysplasia 12/30/2022 Yes    Encephalopathy acute 1/1/2023 Yes    Acute hyperactive alcohol withdrawal delirium ([address] 12/30/2022 Yes    Decompensation of cirrhosis of liver ([address] 1/1/2023 Yes    DDD (degenerative disc disease), lumbar 12/29/2022 Yes    Acute upper GI hemorrhage 12/29/2022 Yes    Gastrointestinal hemorrhage with melena 12/29/2022 Yes    Mastoid disorder, bilateral 12/31/2022 Yes    Overview Signed 12/31/2022  4:50 PM by Denise Kelly MD     biLateral mastoid effusion          Plan:        Acute upper GI bleed due to decompensated alcoholic cirrhosis and gastric AVM: EGD showed large varices with moderate portal hypertensive gastropathy. Had 4.5 L removed with paracentesis. Received 3 units of blood total. This has resolved. Start Coreg.  Add lasix/aldactone if sodium restriction does not work.   Hepatic encephalopathy: restart lactulose titrate to 3 loose BM daily  Debility: Needs PTOT   Pneumonia: Continue Levaquin until 1/7  PCM suggested: Dietary consulted, continue feeding.    PTOT    Dispo: need PTOT evaluation and possible placement d/c order placed 1/4  Dayami Hoffmann,   1/4/2023  2:15 PM

## 2023-01-04 NOTE — PROGRESS NOTES
[facility]. [facility]   Gastroenterology Progress Note    Baljeet Kumar is a 60 y.o. male patient. Hospitalization Day:7      Chief consult reason:   Evaluation for GI bleeding, melena    Subjective:  Patient seen and examined. No acute events overnight  Pt still slightly encephalopathic-calm, cooperative, oriented. Tolerating diet  No rectal bleeding  Pt was started on Levaquin for suspected PNA  Labs stable    Pt had repeat Endoscopy yesterday that showed Grade 1-2 varices in distal esophagus without stigmata of bleeding, few avm's in cardia, mild diffuse portal hypertensive gastropathy    VITALS:  /61   Pulse 93   Temp 99.9 °F (37.7 °C) (Temporal)   Resp 18   Ht 5' 10\" (1.778 m)   Wt 220 lb 7.4 oz (100 kg)   SpO2 96%   BMI 31.63 kg/m²   TEMPERATURE:  Current - Temp: 99.9 °F (37.7 °C); Max - Temp  Av.2 °F (36.8 °C)  Min: 97.2 °F (36.2 °C)  Max: 99.9 °F (37.7 °C)    Physical Assessment:  General appearance: uncooperative, confused  Mental Status:  oriented to person  Lungs:  clear to auscultation bilaterally, normal effort  Heart:  regular rate and rhythm, no murmur  Abdomen:  soft, nontender, more distended, normal bowel sounds, no masses, hepatomegaly, splenomegaly  Extremities:  no edema, redness, tenderness in the calves  Skin:  no gross lesions, rashes, induration    Data Review:    Labs and Imaging:     CBC:  Recent Labs     23  0959 23  1648 23  0325 23  0557 23  1652 23  2129 23  0304   WBC 4.2  --   --  4.8  --   --  5.2   HGB 8.5*   < > 7.9* 7.2* 7.6* 8.0* 7.8*   MCV 89.3  --   --  87.7  --   --  89.0   RDW 20.5*  --   --  20.7*  --   --  20.5*   PLT 70*  --   --  70*  --   --  94*    < > = values in this interval not displayed. ANEMIA STUDIES:  No results for input(s): LABIRON, TIBC, FERRITIN, [id number], FOLATE, OCCULTBLD in the last 72 hours.       BMP:  Recent Labs     23  0959 23  0557 23  0304   NA 139 140 135   K 3.2* 3.3* 4.0   * 111* 111*   CO2 18* 21 18*   BUN 4* 4* 5*   CREATININE 0.40* 0.40* 0.51*   GLUCOSE 141* 102* 89   CALCIUM 7.3* 7.5* 7.5*         LFTS:  Recent Labs     01/01/23  1458 01/01/23  1619 01/02/23  0959 01/03/23  0557 01/04/23  0304   ALKPHOS 111 113 121 95 122   ALT 27 27 35 30 32   AST 98* 101* 135* 110* 127*   BILITOT 4.8* 4.9* 5.4* 5.0* 4.6*   BILIDIR  --   --   --   --  2.8*   LABALBU 2.1* 2.2* 2.3* 2.3* 2.0*         Amylase/Lipase and Ammonia:  Recent Labs     01/02/23  0959 01/03/23  0557   AMMONIA 38 36         Acute Hepatitis Panel:  Lab Results   Component Value Date/Time    HEPBSAG NONREACTIVE 12/23/2020 05:09 PM    HEPCAB REACTIVE 12/23/2020 05:09 PM    HEPBIGM NONREACTIVE 12/23/2020 05:09 PM    HEPAIGM NONREACTIVE 08/19/2021 01:29 PM       HCV Genotype:  Lab Results   Component Value Date/Time    HEPATITISCGENOTYPE Mixed 09/06/2016 02:07 PM       HCV Quantitative:  Lab Results   Component Value Date/Time    HCVQNT NOT REPORTED 09/06/2016 02:07 PM       LIVER WORK UP:    AFP  Lab Results   Component Value Date/Time    AFP 2.5 04/13/2022 03:37 PM       Alpha 1 antitrypsin   Lab Results   Component Value Date/Time    A1A 152 08/19/2021 01:29 PM       MARYSOL  Lab Results   Component Value Date/Time    MARYSOL NEGATIVE 07/21/2016 09:29 PM       AMA  Lab Results   Component Value Date/Time    MITOAB 2.6 07/21/2016 09:29 PM       ASMA  No results found for: SMOOTHMUSCAB    PT/INR  Recent Labs     01/02/23  1055 01/03/23  0557   PROTIME 14.7* 16.0*   INR 1.4 1.6         Cancer Markers:  CEA:  No results for input(s): CEA in the last 72 hours. Ca 125:  No results for input(s):  in the last 72 hours. Ca 19-9:   Invalid input(s):   AFP: No results for input(s): AFP in the last 72 hours.   Lactic acid:Invalid input(s): LACTIC ACID    Radiology Review:    US LIVER    Result Date: 12/28/2022  EXAMINATION: RIGHT UPPER QUADRANT ULTRASOUND 12/28/2022 4:16 pm COMPARISON: CT abdomen 12/27/2022 HISTORY: ORDERING SYSTEM PROVIDED HISTORY: check patency of TIPS FINDINGS: LIVER:  The liver demonstrates heterogeneous, multinodular pattern echogenicity without dominant hepatic lesion demonstrated sonographically. No evidence of intrahepatic biliary ductal dilatation. TIPS: Duplex and Doppler waveform imaging of the tips was performed with the following flow velocities: Distal 30.6 cm/second, mid 29.1 cm/second, proximal 33.6 cm/second. BILIARY SYSTEM:  Gallbladder demonstrates a number of layering, echogenic shadowing stones. No wall thickening evident. Negative sonographic Silva's sign. Common bile duct is within normal limits measuring 3 mm. RIGHT KIDNEY: The right kidney is grossly unremarkable without evidence of hydronephrosis. Right kidney long dimension 11.2 cm. PANCREAS:  Visualized portions of the pancreas are unremarkable. OTHER: Small volume right upper quadrant ascites. 1.  Cholelithiasis without definite findings of acute cholecystitis. 2. Patent TIPS. Specific velocities recorded above. 3. Small volume abdominal ascites. 4. Hepatic morphologic features typical of cirrhosis. Active Problems:    Ascites due to alcoholic cirrhosis (HCC)    Shortness of breath    Portal hypertension (HCC)    Esophageal varices with bleeding (HCC)    Other abnormalities of gait and mobility    S/P TIPS (transjugular intrahepatic portosystemic shunt)    Acute blood loss anemia    Near syncope    Bleeding gastric varices    Hepatic encephalopathy    Periumbilical abdominal pain    Lezama's esophagus with dysplasia    Encephalopathy acute    Acute hyperactive alcohol withdrawal delirium (HCC)    Decompensation of cirrhosis of liver (HCC)    DDD (degenerative disc disease), lumbar    Acute upper GI hemorrhage    Gastrointestinal hemorrhage with melena    Mastoid disorder, bilateral  Resolved Problems:    * No resolved hospital problems.  *       GI Impression:    Decompensated liver cirrhosis s/p TIPS revision 12/30/2022 with upsizing of stent and embolization of left gastric vein. Hgb dropped slightly to 7.6 g/dL. -No overt bleeding noted  -Per IR note-if patient continues to have GI bleeding may consider alternative procedure BATO per IR  -Repeat EGD showed Grade 1-2 varices in distal esophagus without stigmata of bleeding, few avm's in cardia, mild diffuse portal hypertensive gastropathy  Grade 1 encephalopathy-secondary to underlying cirrhosis, recent TIPS revision, PNA  Esophageal and gastric varices s/p EGD 12/29/2022  Refractory ascites secondary to underlying liver disease, recent TIPS  Hyponatremia most likely secondary to underlying cirrhosis-resolved  Thrombocytopenia secondary to underlying cirrhosis      Plan and Recommendations:    Diuretics can be considered as OP once we see if the ascites re accumulated now that his TIPS has been revised  Continue lactulose  g every 8 hours. Titrate to 3-4 bm's daily  Soft diet-low salt high protein with supplements TID  Cont medical mgt  Pt will need to follow up in the GI office with Dr. Susy Joshua in 2-3 weeks      This plan was formulated in collaboration with Dr. Beto Talavera MD    Thank you for allowing me to participate in the care of your patient. Please feel free to contact me with any questions or concerns. [address] [facility]   Raymond Fields, [address] [place] [address]   [phone]  1/4/2023  2:02 PM    Estimated time of 45 mins reviewing chart, assessing patient and formulating plan of care    This note was created with the assistance of a speech-recognition program.  Although the intention is to generate a document that actually reflects the content of the visit, no guarantees can be provided that every mistake has been identified and corrected by editing.

## 2023-01-04 NOTE — CARE COORDINATION
Met with patient to discuss transitional planning. Discussed SNF for short term rehab. [place] of choice provided. Patient agreeable to referral to [facility]. Referral sent as requested. Patient also requested that CM update Nilam on plan as well.  Call placed to Joesph Fish @ [phone] and left vm message to request call back

## 2023-01-04 NOTE — PROGRESS NOTES
Physical Therapy  Facility/Department: [address] STEPDOWN  Physical Therapy Daily Treatment Note  Name: Mary Colon  :   MRN: [id number]  Date of Service: 2023    Discharge Recommendations:  Patient would benefit from continued therapy after discharge   PT Equipment Recommendations  Equipment Needed: No      Patient Diagnosis(es): There were no encounter diagnoses. 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 ([address] Alcoholic cirrhosis of liver with ascites ([address] Anemia, Anxiety, Arthritis, Back pain, chronic, Lezama esophagus, BPH (benign prostatic hypertrophy), Cholelithiasis, Cirrhosis ([address] COVID-19, COVID-19 vaccine series completed, DDD (degenerative disc disease), lumbar, Depression, Esophageal cancer ([address] Esophageal varices ([address] Fatty liver, GERD (gastroesophageal reflux disease), GI bleed, Hep C w/o coma, chronic ([address] History of alcohol abuse, History of blood transfusion, History of colon polyps, History of tobacco abuse, [place] (hard of hearing), Hyperlipidemia, Hypertension, Hyponatremia, Hypotension, Mastoid disorder, bilateral, PONV (postoperative nausea and vomiting), Port-A-Cath in place, Portal hypertension ([address] Sciatica, Secondary esophageal varices ([address] Shortness of breath, Spinal stenosis, Stomach ulcer, Thrombocytopenia ([address] Tubular adenoma of colon, Vitamin D deficiency, and Wears glasses. Past Surgical History:  has a past surgical history that includes Bunionectomy; Nasal septum surgery; other surgical history (2016); Colonoscopy; Colonoscopy (10/05/2016); other surgical history (2016); other surgical history (2016); knee surgery (Left); Bunionectomy (Left); Endoscopy, colon, diagnostic; pr neuroplasty &/transpos median nrv carpal tunne (Right, 2017); Carpal tunnel release (Right); pr neuroplasty &/transpos median nrv carpal tunne (Left, 10/31/2017); Colonoscopy (N/A, 2018);  Colonoscopy (2018); pr njx aa&/strd tfrml epi lumbar/sacral 1 level (Bilateral, 09/06/2018); other surgical history (09/28/2018); pr njx aa&/strd tfrml epi lumbar/sacral 1 level (N/A, 09/28/2018); Pain management procedure (Left, 07/09/2020); Pain management procedure (Left, 07/20/2020); Pain management procedure (Bilateral, 08/17/2020); other surgical history (Right, 11/23/2020); Nerve Block (Right, 11/23/2020); Pain management procedure (Bilateral, 12/07/2020); Upper gastrointestinal endoscopy (N/A, 12/29/2020); Upper gastrointestinal endoscopy (N/A, 02/02/2021); Upper gastrointestinal endoscopy (N/A, 02/12/2021); Upper gastrointestinal endoscopy (02/12/2021); [place] tooth extraction; IR PORT PLACEMENT > 5 YEARS (04/19/2021); Upper gastrointestinal endoscopy (N/A, 08/31/2021); Upper gastrointestinal endoscopy (N/A, 01/21/2022); Upper gastrointestinal endoscopy (N/A, 04/15/2022); Colonoscopy (N/A, 04/16/2022); Upper gastrointestinal endoscopy (N/A, 06/06/2022); Upper gastrointestinal endoscopy (N/A, 06/09/2022); Paracentesis; Upper gastrointestinal endoscopy (N/A, 09/14/2022); Upper gastrointestinal endoscopy (N/A, 10/19/2022); Upper gastrointestinal endoscopy (N/A, 10/31/2022); IR TIPS INSERTION (12/01/2022); Esophagogastroduodenoscopy (12/29/2022); Upper gastrointestinal endoscopy (12/29/2022); and Esophagogastroduodenoscopy (N/A, 01/03/2023). Assessment   Body Structures, Functions, Activity Limitations Requiring Skilled Therapeutic Intervention: Decreased functional mobility ; Decreased strength; Increased pain;Decreased posture;Decreased cognition;Decreased ROM; Decreased endurance;Decreased balance  Assessment: Pt required Natalie to perform bed mobility. Pt sat EOB for 10 mins with no LOB noted. Pt performed STS transfer x2 with CGA while using a RW along with 3L of O2. Pt demos a standing tolerance of 2 mins on each attempt. Pt unable to ambulate this date due to c/o increased dizziness upon standing.  Pt is a fall risk and will require 24hr support upon discharge due to cognition deficits and level of assistance required to safely perform functional mobility. Recommending continued skilled physical therapy to address these deficits and return pt to prior level of function. Therapy Prognosis: Fair  Decision Making: Medium Complexity  Requires PT Follow-Up: Yes  Activity Tolerance  Activity Tolerance: Treatment limited secondary to decreased cognition;Patient tolerated treatment well     Plan   Physcial Therapy Plan  General Plan:  (5-6x/week)  Current Treatment Recommendations: Strengthening, ROM, Balance training, Functional mobility training, Transfer training, Endurance training, Gait training, Stair training, Safety education & training, Patient/Caregiver education & training, Equipment evaluation, education, & procurement, Therapeutic activities, Home exercise program  Safety Devices  Type of Devices: Nurse notified, Left in bed, Call light within reach, Bed alarm in place  Restraints  Restraints Initially in Place: No     Restrictions  Restrictions/Precautions  Restrictions/Precautions: Fall Risk  Required Braces or Orthoses?: No  Position Activity Restriction  Other position/activity restrictions: s/p TIPS revision 12/30/22     Subjective   General  Chart Reviewed: Yes  Response To Previous Treatment: Patient with no complaints from previous session. Follows Commands: Within Functional Limits  General Comment  Comments: No reports of pain at this time. Subjective  Subjective: RN and pt in agreement for PT. Pt supine in bed upon PT arrival, pt alert and oriented, however, frequently asks \"how long will I be here in this program?\" requiring reorientation to hospital setting. Cognition   Orientation  Overall Orientation Status: Within Functional Limits  Cognition  Overall Cognitive Status: Exceptions  Following Commands:  Follows multistep commands with increased time  Attention Span: Difficulty dividing attention  Safety Judgement: Decreased awareness of need for safety;Decreased awareness of need for assistance  Problem Solving: Assistance required to generate solutions  Insights: Decreased awareness of deficits  Initiation: Requires cues for some  Sequencing: Requires cues for some     Objective   Bed mobility  Supine to Sit: Minimal assistance  Sit to Supine: Minimal assistance  Scooting: Contact guard assistance  Bed Mobility Comments: Max verbal cueing required for sequencing and intiation of mobility with good return demo. Transfers  Sit to Stand: Stand by assistance  Stand to Sit: Stand by assistance        Balance  Posture: Fair  Sitting - Static: Good;-  Sitting - Dynamic: Fair;+  Standing - Static: Fair;-  Standing - Dynamic: Poor  Comments: Standing balance assessed with RW.  A/AROM Exercises: AP's x20 BLE, LAQs x20 BLE. AM-PAC Score  AM-PAC Inpatient Mobility Raw Score : 18 (01/04/23 1330)  AM-PAC Inpatient T-Scale Score : 43.63 (01/04/23 1330)  Mobility Inpatient CMS 0-100% Score: 46.58 (01/04/23 1330)  Mobility Inpatient CMS G-Code Modifier : CK (01/04/23 1330)       Goals  Short Term Goals  Time Frame for Short Term Goals: 14  Short Term Goal 1: Pt to perform bed mobility independently  Short Term Goal 2: Demonstrate functional transfer independently  Short Term Goal 3: Pt to ambulate 100ft w/ least restrictive AD independently  Short Term Goal 4: Tolerate 30 minutes of therapy to demo increased endurance  Patient Goals   Patient Goals :  To go home       Therapy Time   Individual Concurrent Group Co-treatment   Time In 1115         Time Out 1145         Minutes 30         Timed Code Treatment Minutes: Tiffanie 17, PTA

## 2023-01-04 NOTE — PROGRESS NOTES
SPIRITUAL CARE DEPARTMENT - [facility] [address]  PROGRESS NOTE    Shift date: 1-4-23  Shift day: Wednesday   Shift # 2    Room # 1998/7431-15   Name: Edyta Fraser                   Referral:  Advanced Directives    Admit Date & Time: 12/28/2022 11:30 PM    Assessment:  Edyta Fraser is a 60 y.o. male in the hospital because weak . Upon entering the room writer observes PT: is passive, does not share much      Intervention:  Writer introduced self and title as  Writer offered space for PT:   to express feelings, needs, and concerns and provided a ministry presence. PT: had consult about POA. PT: declines states he has paper work.  asks for family member to bring in [facility] papers. Outcome:  PT: declines POA paper work and prayer, PT: passive. Plan:  Chaplains will remain available to offer spiritual and emotional support as needed. Electronically signed by Chaplain Harriet, on 1/4/2023 at 6:03 PM.  [facility]  [phone]      01/04/23 4166   Encounter Summary   Service Provided For: Patient   Referral/Consult From: [employer]   Support System Significant other   Last Encounter  01/04/23   Complexity of Encounter Moderate   Begin Time 0515   End Time  0530   Total Time Calculated 15 min   Encounter    Type Initial Screen/Assessment   Advance Care Planning   Type ACP conversation; Refused ACP conversation   Assessment/Intervention/Outcome   Assessment Passive; Anxious   Intervention Active listening;Discussed illness injury and its impact;Sustaining Presence/Ministry of presence   Outcome Expressed feelings, needs, and concerns

## 2023-01-04 NOTE — PROGRESS NOTES
Occupational Therapy  Facility/Department: [address] STEPDOWN  Occupational Therapy Daily Treatment Note    Name: Nancy Monsalve  :   MRN: [id number]  Date of Service: 2023    Discharge Recommendations:  Patient would benefit from continued therapy after discharge  OT Equipment Recommendations  Equipment Needed: Yes  Mobility Devices: Angel Alstrom; ADL Assistive Devices  Walker: Rolling  ADL Assistive Devices: Transfer Tub Bench;Reacher;Long-handled Shoe [place];Long-handled Sponge;Sock-Aid Hard    Patient Diagnosis(es): There were no encounter diagnoses. 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 ([address] Alcoholic cirrhosis of liver with ascites ([address] Anemia, Anxiety, Arthritis, Back pain, chronic, Lezama esophagus, BPH (benign prostatic hypertrophy), Cholelithiasis, Cirrhosis ([address] COVID-19, COVID-19 vaccine series completed, DDD (degenerative disc disease), lumbar, Depression, Esophageal cancer ([address] Esophageal varices ([address] Fatty liver, GERD (gastroesophageal reflux disease), GI bleed, Hep C w/o coma, chronic ([address] History of alcohol abuse, History of blood transfusion, History of colon polyps, History of tobacco abuse, [place] (hard of hearing), Hyperlipidemia, Hypertension, Hyponatremia, Hypotension, Mastoid disorder, bilateral, PONV (postoperative nausea and vomiting), Port-A-Cath in place, Portal hypertension ([address] Sciatica, Secondary esophageal varices ([address] Shortness of breath, Spinal stenosis, Stomach ulcer, Thrombocytopenia ([address] Tubular adenoma of colon, Vitamin D deficiency, and Wears glasses. Past Surgical History:  has a past surgical history that includes Bunionectomy; Nasal septum surgery; other surgical history (2016); Colonoscopy; Colonoscopy (10/05/2016); other surgical history (2016); other surgical history (2016); knee surgery (Left); Bunionectomy (Left);  Endoscopy, colon, diagnostic; pr neuroplasty &/transpos median nrv carpal tunne (Right, 08/29/2017); Carpal tunnel release (Right); pr neuroplasty &/transpos median nrv carpal tunne (Left, 10/31/2017); Colonoscopy (N/A, 03/30/2018); Colonoscopy (03/30/2018); pr njx aa&/strd tfrml epi lumbar/sacral 1 level (Bilateral, 09/06/2018); other surgical history (09/28/2018); pr njx aa&/strd tfrml epi lumbar/sacral 1 level (N/A, 09/28/2018); Pain management procedure (Left, 07/09/2020); Pain management procedure (Left, 07/20/2020); Pain management procedure (Bilateral, 08/17/2020); other surgical history (Right, 11/23/2020); Nerve Block (Right, 11/23/2020); Pain management procedure (Bilateral, 12/07/2020); Upper gastrointestinal endoscopy (N/A, 12/29/2020); Upper gastrointestinal endoscopy (N/A, 02/02/2021); Upper gastrointestinal endoscopy (N/A, 02/12/2021); Upper gastrointestinal endoscopy (02/12/2021); [place] tooth extraction; IR PORT PLACEMENT > 5 YEARS (04/19/2021); Upper gastrointestinal endoscopy (N/A, 08/31/2021); Upper gastrointestinal endoscopy (N/A, 01/21/2022); Upper gastrointestinal endoscopy (N/A, 04/15/2022); Colonoscopy (N/A, 04/16/2022); Upper gastrointestinal endoscopy (N/A, 06/06/2022); Upper gastrointestinal endoscopy (N/A, 06/09/2022); Paracentesis; Upper gastrointestinal endoscopy (N/A, 09/14/2022); Upper gastrointestinal endoscopy (N/A, 10/19/2022); Upper gastrointestinal endoscopy (N/A, 10/31/2022); IR TIPS INSERTION (12/01/2022); Esophagogastroduodenoscopy (12/29/2022); Upper gastrointestinal endoscopy (12/29/2022); Esophagogastroduodenoscopy (N/A, 01/03/2023); and Upper gastrointestinal endoscopy (N/A, 1/3/2023). Assessment   Performance deficits / Impairments: Decreased functional mobility ; Decreased ADL status; Decreased strength;Decreased safe awareness;Decreased cognition;Decreased endurance;Decreased balance;Decreased high-level IADLs;Decreased coordination  Prognosis: Good  REQUIRES OT FOLLOW-UP: Yes  Activity Tolerance  Activity Tolerance: Patient limited by fatigue;Patient Tolerated treatment well        Plan   Occupational Therapy Plan  Times Per Week: 3-5 x/wk  Current Treatment Recommendations: Strengthening, Balance training, Functional mobility training, Endurance training, Cognitive reorientation, Safety education & training, Patient/Caregiver education & training, Equipment evaluation, education, & procurement, Self-Care / ADL, Home management training, Positioning, Coordination training     Restrictions  Restrictions/Precautions  Restrictions/Precautions: Fall Risk  Required Braces or Orthoses?: No  Position Activity Restriction  Other position/activity restrictions: s/p TIPS revision 12/30/22    Subjective   General  Patient assessed for rehabilitation services?: Yes  Response to previous treatment: Patient with no complaints from previous session  Family / Caregiver Present: No  Subjective  Subjective: Pt did not report any pain this date. General Comment  Comments: RN ok'd pt for OT session this date. Pt agreeable to session and pleasant/cooperative throughout. Pt denies pain. Pt supine in bed w/ HOB elevated completing bathing tasks w/ RN. Pt completed oral care at EOB and shampooing of hair supine in bed, and stayed supine in bed w/ HOB elevated, call light within reach, all needs met and RN notified at end of session. Objective   O2 Device: None (Room air)    Safety Devices  Type of Devices: Nurse notified; Left in bed;Call light within reach; Bed alarm in place; Telesitter in use;Patient at risk for falls  Restraints  Restraints Initially in Place: No  Balance  Sitting: Without support (CGA required throughout ~8-10 min seated, SBA to complete ADLs. )  Standing:  (JASON d/t pt reporting dizziness.)  Transfer Training  Transfer Training: No (unsafe to attempt functional transfers secondary to dizziness)    ADL  Grooming: Setup; Increased time to complete;Minimal assistance  Grooming Skilled Clinical Factors: Pt completed brushing teeth at EOB and shampooing hair with shampoo cap while supine in bed. CGA for sitting EOB. Pt needed Min A with placing tooth paste on toothbrush and putting cap back on toothpaste. Pt declined further ADLs having already completed them by with RN. Activity Tolerance  Activity Tolerance: Treatment limited secondary to decreased cognition;Patient tolerated treatment well  Bed mobility  Supine to Sit: Minimal assistance  Sit to Supine: Minimal assistance  Scooting: Contact guard assistance  Bed Mobility Comments: Pt required VCs for initiation of mobility with good return demo. Cognition  Overall Cognitive Status: WFL  Overall Orientation Status: Within Functional Limits    Education Given To: Patient  Education Provided Comments: Pt ed on OT role, OT POC, bed mobility, ADL adaptive tech, importance of continued OT. -good/fair return  Education Method: Verbal  Barriers to Learning: Cognition;Vision  Education Outcome: Verbalized understanding;Demonstrated understanding;Continued education needed    AM-PAC Score  AM-PAC Inpatient Daily Activity Raw Score: 15 (01/04/23 1642)  AM-PAC Inpatient ADL T-Scale Score : 34.69 (01/04/23 1642)  ADL Inpatient CMS 0-100% Score: 56.46 (01/04/23 1642)  ADL Inpatient CMS G-Code Modifier : CK (01/04/23 1642)    Goals  Short Term Goals  Time Frame for Short Term Goals: By discharge, pt will:  Short Term Goal 1: Demo SUP for bed mobility to increase independence with ADLs and decrease risk for pressure injury  Short Term Goal 2: Demo Mod I for feeding, grooming, and UB ADLs with AE use PRN  Short Term Goal 3: Demo CGA for LB ADLs and toileting tasks with AE use PRN  Short Term Goal 4: Follow 50% of 2-step commands with appropriate initiation and sequencing for improved functional independence  Short Term Goal 5:  Incorporate/verbalize 2 edema management techniques during therapy session with 100% accuracy  Short Term Goal 6: Engage in functional transfers and functional mobility with CGA and use of LRD PRN for engagement in ADLs/IADLs       Therapy Time   Individual Concurrent Group Co-treatment   Time In 2494         Time Out 1512         Minutes 25         Timed Code Treatment Minutes: [address] Galindo/L

## 2023-01-04 NOTE — PLAN OF CARE
Problem: Discharge Planning  Goal: Discharge to home or other facility with appropriate resources  1/4/2023 1037 by Augusta Pandya RN  Outcome: Progressing  1/4/2023 0442 by Ange Garcia RN  Outcome: Progressing     Problem: Pain  Goal: Verbalizes/displays adequate comfort level or baseline comfort level  1/4/2023 1037 by Augusta Pandya RN  Outcome: Progressing  1/4/2023 0442 by Ange Garcia RN  Outcome: Progressing     Problem: Respiratory - Adult  Goal: Achieves optimal ventilation and oxygenation  1/4/2023 1037 by Augusta Pandya RN  Outcome: Progressing  1/4/2023 0820 by Pieter Mccarthy RCP  Outcome: Progressing  1/4/2023 0442 by Ange Garcia RN  Outcome: Progressing     Problem: Cardiovascular - Adult  Goal: Maintains optimal cardiac output and hemodynamic stability  1/4/2023 1037 by Augusta Pandya RN  Outcome: Progressing  1/4/2023 0442 by Ange Garcia RN  Outcome: Progressing  Goal: Absence of cardiac dysrhythmias or at baseline  1/4/2023 1037 by Augusta Pandya RN  Outcome: Progressing  1/4/2023 0442 by Ange Garcia RN  Outcome: Progressing     Problem: Musculoskeletal - Adult  Goal: Return mobility to safest level of function  1/4/2023 1037 by Augusta Pandya RN  Outcome: Progressing  1/4/2023 0442 by Ange Garcia RN  Outcome: Progressing  Goal: Maintain proper alignment of affected body part  1/4/2023 1037 by Augusta Pandya RN  Outcome: Progressing  1/4/2023 0442 by Ange Garcia RN  Outcome: Progressing  Goal: Return ADL status to a safe level of function  1/4/2023 1037 by Augusta Pandya RN  Outcome: Progressing  1/4/2023 0442 by Ange Garcia RN  Outcome: Progressing     Problem: Gastrointestinal - Adult  Goal: Minimal or absence of nausea and vomiting  1/4/2023 1037 by Augusta Pandya RN  Outcome: Progressing  1/4/2023 0442 by Ange Garcia RN  Outcome: Progressing  Goal: Maintains or returns to baseline bowel function  1/4/2023 1037 by Terrell Rosenthal Kerri Alvarez RN  Outcome: Progressing  1/4/2023 0442 by Trish Davison RN  Outcome: Progressing     Problem: Hematologic - Adult  Goal: Maintains hematologic stability  1/4/2023 1037 by Gaye John RN  Outcome: Progressing  1/4/2023 0442 by Trish Davison RN  Outcome: Progressing     Problem: Skin/Tissue Integrity  Goal: Absence of new skin breakdown  Description: 1. Monitor for areas of redness and/or skin breakdown  2. Assess vascular access sites hourly  3. Every 4-6 hours minimum:  Change oxygen saturation probe site  4. Every 4-6 hours:  If on nasal continuous positive airway pressure, respiratory therapy assess nares and determine need for appliance change or resting period. 1/4/2023 1037 by Gaye John RN  Outcome: Progressing  1/4/2023 0442 by Trish Davison RN  Outcome: Progressing     Problem: Safety - Adult  Goal: Free from fall injury  1/4/2023 1037 by Gaye Jhon RN  Outcome: Progressing  1/4/2023 0442 by Trish Davison RN  Outcome: Progressing     Problem: ABCDS Injury Assessment  Goal: Absence of physical injury  1/4/2023 1037 by Gaye John RN  Outcome: Progressing  1/4/2023 0442 by Trish Davison RN  Outcome: Progressing     Problem: Safety - Medical Restraint  Goal: Remains free of injury from restraints (Restraint for Interference with Medical Device)  Description: INTERVENTIONS:  1. Determine that other, less restrictive measures have been tried or would not be effective before applying the restraint  2. Evaluate the patient's condition at the time of restraint application  3. Inform patient/family regarding the reason for restraint  4.  Q2H: Monitor safety, psychosocial status, comfort, nutrition and hydration  1/4/2023 1037 by Gaye John RN  Outcome: Progressing  1/4/2023 0442 by Trish Davison RN  Outcome: Progressing

## 2023-01-05 LAB
GLUCOSE BLD-MCNC: 101 MG/DL (ref 75–110)
GLUCOSE BLD-MCNC: 85 MG/DL (ref 75–110)
GLUCOSE BLD-MCNC: 89 MG/DL (ref 75–110)
GLUCOSE BLD-MCNC: 96 MG/DL (ref 75–110)

## 2023-01-05 PROCEDURE — 98960 EDU&TRN PT SELF-MGMT NQHP 1: CPT

## 2023-01-05 PROCEDURE — 6370000000 HC RX 637 (ALT 250 FOR IP): Performed by: NURSE PRACTITIONER

## 2023-01-05 PROCEDURE — 1200000000 HC SEMI PRIVATE

## 2023-01-05 PROCEDURE — 94761 N-INVAS EAR/PLS OXIMETRY MLT: CPT

## 2023-01-05 PROCEDURE — 2580000003 HC RX 258: Performed by: NURSE PRACTITIONER

## 2023-01-05 PROCEDURE — 99232 SBSQ HOSP IP/OBS MODERATE 35: CPT | Performed by: INTERNAL MEDICINE

## 2023-01-05 PROCEDURE — 82947 ASSAY GLUCOSE BLOOD QUANT: CPT

## 2023-01-05 PROCEDURE — 99231 SBSQ HOSP IP/OBS SF/LOW 25: CPT | Performed by: INTERNAL MEDICINE

## 2023-01-05 PROCEDURE — 99232 SBSQ HOSP IP/OBS MODERATE 35: CPT | Performed by: FAMILY MEDICINE

## 2023-01-05 PROCEDURE — 6370000000 HC RX 637 (ALT 250 FOR IP): Performed by: INTERNAL MEDICINE

## 2023-01-05 PROCEDURE — 94640 AIRWAY INHALATION TREATMENT: CPT

## 2023-01-05 RX ORDER — SODIUM CHLORIDE 9 MG/ML
INJECTION, SOLUTION INTRAVENOUS PRN
Status: CANCELLED | OUTPATIENT
Start: 2023-01-05

## 2023-01-05 RX ORDER — SODIUM CHLORIDE 0.9 % (FLUSH) 0.9 %
5-40 SYRINGE (ML) INJECTION PRN
Status: CANCELLED | OUTPATIENT
Start: 2023-01-05

## 2023-01-05 RX ORDER — SODIUM CHLORIDE 0.9 % (FLUSH) 0.9 %
5-40 SYRINGE (ML) INJECTION EVERY 12 HOURS SCHEDULED
Status: CANCELLED | OUTPATIENT
Start: 2023-01-05

## 2023-01-05 RX ADMIN — IPRATROPIUM BROMIDE AND ALBUTEROL SULFATE 1 AMPULE: .5; 3 SOLUTION RESPIRATORY (INHALATION) at 08:12

## 2023-01-05 RX ADMIN — IPRATROPIUM BROMIDE AND ALBUTEROL SULFATE 1 AMPULE: .5; 3 SOLUTION RESPIRATORY (INHALATION) at 11:46

## 2023-01-05 RX ADMIN — POTASSIUM CHLORIDE 10 MEQ: 1500 TABLET, EXTENDED RELEASE ORAL at 22:25

## 2023-01-05 RX ADMIN — INSULIN LISPRO 4 UNITS: 100 INJECTION, SOLUTION INTRAVENOUS; SUBCUTANEOUS at 14:09

## 2023-01-05 RX ADMIN — LACTULOSE 10 G: 20 SOLUTION ORAL at 08:35

## 2023-01-05 RX ADMIN — PANTOPRAZOLE SODIUM 40 MG: 40 TABLET, DELAYED RELEASE ORAL at 08:35

## 2023-01-05 RX ADMIN — INSULIN LISPRO 4 UNITS: 100 INJECTION, SOLUTION INTRAVENOUS; SUBCUTANEOUS at 09:57

## 2023-01-05 RX ADMIN — LACTULOSE 10 G: 20 SOLUTION ORAL at 21:46

## 2023-01-05 RX ADMIN — CARVEDILOL 3.12 MG: 3.12 TABLET, FILM COATED ORAL at 08:47

## 2023-01-05 RX ADMIN — POTASSIUM CHLORIDE 10 MEQ: 1500 TABLET, EXTENDED RELEASE ORAL at 08:35

## 2023-01-05 RX ADMIN — SODIUM CHLORIDE, PRESERVATIVE FREE 10 ML: 5 INJECTION INTRAVENOUS at 08:48

## 2023-01-05 RX ADMIN — LEVOFLOXACIN 500 MG: 500 TABLET, FILM COATED ORAL at 08:36

## 2023-01-05 RX ADMIN — SODIUM CHLORIDE, PRESERVATIVE FREE 10 ML: 5 INJECTION INTRAVENOUS at 08:47

## 2023-01-05 RX ADMIN — SODIUM CHLORIDE, PRESERVATIVE FREE 10 ML: 5 INJECTION INTRAVENOUS at 21:46

## 2023-01-05 ASSESSMENT — ENCOUNTER SYMPTOMS
EYE REDNESS: 0
ABDOMINAL DISTENTION: 1
APNEA: 0
SHORTNESS OF BREATH: 1
EYE DISCHARGE: 0
COLOR CHANGE: 0
RHINORRHEA: 0
ABDOMINAL PAIN: 0
COUGH: 1

## 2023-01-05 NOTE — PROGRESS NOTES
[facility]  Office: [address], DO, Levell Ebbs, DO, Lazarus List, DO, Rk Palm Blood, DO, Reji Wells MD, Norman Stafford MD, Bharat Crystal MD, Chacorta Perdomo MD,  Celena Hobbs MD, Reggie Martínez MD, Vandana Ferris, DO, Jaida Ruiz MD,  Marcos Whitfield MD, Katina Zamorano MD, Peng Mera, DO, Rayna Sicard, MD, Yusra Lowe MD, Katie Barreto, DO, Wilfrid Singer MD, Cathryn Painting MD, Shilpa Maki MD, Carmen Sherman MD, Archie Malagon, DO, Brett De Jesus MD, Aaliyah Chaparro MD, Caren Abad, CNP,  Maribel Barroso, CNP, Arben Marsh, CNP, Delia Show, CNP,  Betzy Morel, DNP, Rolando Asters, CNP, Kumar Spray, CNP, Meli Garcia, CNP, Kallie Limb, CNP, Trang Terry, CNP, Alejandro Neumann, PA-C, Connie Johnston, CNS, Oumou Saul, CNP, Gertrude Feliciano, CNP         [address]    Progress Note    1/5/2023    3:05 PM    Name:   Troy Cuellar  MRN:     [id number]     [place]:      [de-identified]   Room:   [id number] Day:  8  Admit Date:  12/28/2022 11:30 PM    PCP:   VASU Lei  Code Status:  Full Code    Subjective:     C/C:  bleeding   Interval History Status:better    Pt feels better today. Thinking more clear still not able to sleep well but feels like this is more related to being woke up. Still feeling weak   No retention of urine     Brief History:     cirrhosis    Review of Systems:     Constitutional:  negative for chills, fevers, sweats feels foggy  and weak   Respiratory:  negative for cough, dyspnea on exertion, shortness of breath, wheezing  Cardiovascular:  negative for chest pain, chest pressure/discomfort, lower extremity edema, palpitations  Gastrointestinal:  negative for abdominal pain, constipation, diarrhea, nausea, vomiting  Neurological:  negative for dizziness, headache    Medications:      Allergies:  No Known Allergies    Current Meds:   Scheduled Meds:    lactulose  10 g Oral TID    carvedilol  3.125 mg Oral BID WC    pantoprazole  40 mg Oral QAM AC    levoFLOXacin  500 mg Oral Daily    insulin lispro  0.05 Units/kg SubCUTAneous TID WC    potassium chloride  10 mEq Oral BID    insulin lispro  0-4 Units SubCUTAneous TID WC    insulin lispro  0-4 Units SubCUTAneous Nightly    ipratropium-albuterol  1 ampule Inhalation 4x daily    sodium chloride flush  5-40 mL IntraVENous 2 times per day    [Held by provider] baclofen  10 mg Oral TID    sodium chloride flush  5-40 mL IntraVENous 2 times per day     Continuous Infusions:    dextrose      dextrose      sodium chloride       PRN Meds: potassium chloride **OR** potassium alternative oral replacement **OR** potassium chloride, magnesium sulfate, sodium phosphate IVPB **OR** sodium phosphate IVPB **OR** sodium phosphate IVPB, glucose, dextrose bolus **OR** dextrose bolus, glucagon (rDNA), dextrose, glucose, dextrose bolus **OR** dextrose bolus, glucagon (rDNA), dextrose, sodium chloride flush, sodium chloride, sodium chloride flush, ondansetron **OR** ondansetron    Data:     Past Medical History:   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 ([facility] Alcoholic cirrhosis of liver with ascites ([facility] Anemia, Anxiety, Arthritis, Back pain, chronic, Lezama esophagus, BPH (benign prostatic hypertrophy), Cholelithiasis, Cirrhosis ([facility] COVID-19, COVID-19 vaccine series completed, DDD (degenerative disc disease), lumbar, Depression, Esophageal cancer ([facility] Esophageal varices ([facility] Fatty liver, GERD (gastroesophageal reflux disease), GI bleed, Hep C w/o coma, chronic ([facility] History of alcohol abuse, History of blood transfusion, History of colon polyps, History of tobacco abuse, [place] (hard of hearing), Hyperlipidemia, Hypertension, Hyponatremia, Hypotension, Mastoid disorder, bilateral, PONV (postoperative nausea and vomiting), Port-A-Cath in place, Portal hypertension ([facility] Sciatica, Secondary esophageal varices ([facility] Shortness of breath, Spinal stenosis, Stomach ulcer, Thrombocytopenia ([address] Tubular adenoma of colon, Vitamin D deficiency, and Wears glasses. Social History:   reports that he quit smoking about 5 years ago. His smoking use included cigarettes. He has a 45.00 pack-year smoking history. He has never used smokeless tobacco. He reports that he does not currently use alcohol. He reports that he does not currently use drugs after having used the following drugs: Cocaine. Frequency: 1.00 time per week. Family History:   Family History   Problem Relation Age of Onset    Cancer Mother         pancreatic    Cancer Father         bone    Diabetes Sister     Asthma Brother     Allergies Brother         MULTIPLE       Vitals:  /74   Pulse 97   Temp 99.5 °F (37.5 °C) (Temporal)   Resp 17   Ht 5' 10\" (1.778 m)   Wt 226 lb 13.7 oz (102.9 kg)   SpO2 92%   BMI 32.55 kg/m²   Temp (24hrs), Av.1 °F (37.3 °C), Min:98.6 °F (37 °C), Max:99.8 °F (37.7 °C)    Recent Labs     23  1526 23  0804 23  1129   POCGLU 93 99 85 89         I/O (24Hr):     Intake/Output Summary (Last 24 hours) at 2023 1505  Last data filed at 2023 1000  Gross per 24 hour   Intake 370 ml   Output 300 ml   Net 70 ml         Labs:  Hematology:  Recent Labs     23  0557 23  1652 23  2129 23  0304   WBC 4.8  --   --  5.2   RBC 2.84*  --   --  3.01*   HGB 7.2* 7.6* 8.0* 7.8*   HCT 24.9* 25.2* 29.1* 26.8*   MCV 87.7  --   --  89.0   MCH 25.4  --   --  25.9   MCHC 28.9  --   --  29.1   RDW 20.7*  --   --  20.5*   PLT 70*  --   --  94*   MPV 10.8  --   --  10.0   INR 1.6  --   --   --        Chemistry:  Recent Labs     238 23  0325 23  0557 23  0304   NA  --   --  140 135   K  --   --  3.3* 4.0   CL  --   --  111* 111*   CO2  --   --  21 18*   GLUCOSE  --   --  102* 89   BUN  --   --  4* 5*   CREATININE  --   --  0.40* 0.51*   MG  --   --  2.1 --    ANIONGAP  --   --  8* 6*   LABGLOM  --   --  >60 >60   CALCIUM  --   --  7.5* 7.5*   PHOS  --   --  1.7*  --    PROBNP  --   --  970  --    LACTACIDWB 2.5* 2.0 1.5  --        Recent Labs     01/03/23  0557 01/03/23  0812 01/04/23  0304 01/04/23  0803 01/04/23  1152 01/04/23  1526 01/04/23 2021 01/05/23  0804 01/05/23  1129   PROT 4.0*  --  3.9*  --   --   --   --   --   --    LABALBU 2.3*  --  2.0*  --   --   --   --   --   --    *  --  127*  --   --   --   --   --   --    ALT 30  --  32  --   --   --   --   --   --    ALKPHOS 95  --  122  --   --   --   --   --   --    BILITOT 5.0*  --  4.6*  --   --   --   --   --   --    BILIDIR  --   --  2.8*  --   --   --   --   --   --    AMMONIA 36  --   --   --   --   --   --   --   --    POCGLU  --    < >  --  87 87 93 99 85 89    < > = values in this interval not displayed. ABG:  Lab Results   Component Value Date/Time    FIO2 INFORMATION NOT PROVIDED 12/30/2022 01:35 AM     Lab Results   Component Value Date/Time    SPECIAL RT HAND 5ML 01/01/2023 02:36 PM     Lab Results   Component Value Date/Time    CULTURE NO GROWTH 2 DAYS 01/03/2023 11:20 AM       Radiology:  XR CHEST (SINGLE VIEW FRONTAL)    Result Date: 12/27/2022  1. Minimal bibasilar atelectatic changes with trace left pleural fluid which is slightly decreased from 12/09/2022. XR RADIUS ULNA RIGHT (2 VIEWS)    Result Date: 1/1/2023  No fracture or dislocation. Mild soft tissue swelling     CT HEAD WO CONTRAST    Result Date: 12/31/2022  CT BRAIN: 1. No evidence of acute hemorrhage, large territorial hypodensity, significant mass effect/midline shift, or ventriculomegaly 2. Involutional parenchymal changes. 3. Age-indeterminate (likely chronic) left basal ganglia lacunar infarct. If clinically indicated, can consider further evaluation with MRI if no contraindications. CT CERVICAL SPINE: 1. No acute abnormality of the cervical spine.  2. Multilevel cervical spondylosis, most notable at C5-C6 and C6-C7. No high-grade bony spinal canal stenosis. CT CERVICAL SPINE WO CONTRAST    Result Date: 12/31/2022  CT BRAIN: 1. No evidence of acute hemorrhage, large territorial hypodensity, significant mass effect/midline shift, or ventriculomegaly 2. Involutional parenchymal changes. 3. Age-indeterminate (likely chronic) left basal ganglia lacunar infarct. If clinically indicated, can consider further evaluation with MRI if no contraindications. CT CERVICAL SPINE: 1. No acute abnormality of the cervical spine. 2. Multilevel cervical spondylosis, most notable at C5-C6 and C6-C7. No high-grade bony spinal canal stenosis. CT ABDOMEN PELVIS W IV CONTRAST Additional Contrast? Radiologist Recommendation    Result Date: 1/3/2023  1. There is a short segment of narrowing of the rectum near the rectosigmoid junction compared to the recent exam and may represent spasm rather than underlying mass. Fluid-filled loops of small bowel and colon suggesting diarrhea with enteritis or colitis. Evaluation of true wall thickening is limited by the diffuse edematous state. 2.  Cirrhotic liver with tips, ascites, and sequela of portal hypertension. 3.  Thickening of the gastric mucosal folds and into the distal esophagus. Correlate with prior history of malignancy. 4.  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 5.  Pleural effusions and pericardial effusion are redemonstrated. The opacified portions of the lower lungs appears to be from compressive atelectasis. CT ABDOMEN PELVIS W IV CONTRAST Additional Contrast? None    Result Date: 12/27/2022  Stable circumferential wall thickening of the distal esophagus and gastroesophageal junction consistent with known esophageal malignancy. No significant change since prior examination. No esophageal obstruction.  Cirrhotic liver with associated portal venous hypertension, progressive ascites and stable splenomegaly. TIPS appears in place and patent Cholelithiasis Interval development of bibasal infiltrates and moderate bilateral pleural effusions as well as mild-to-moderate pericardial effusion     IR FLUORO GUIDED NEEDLE PLACEMENT    Result Date: 12/28/2022  Successful ultrasound-guided placement of a right upper extremity midline venous catheter. US LIVER    Result Date: 12/28/2022  1. Cholelithiasis without definite findings of acute cholecystitis. 2. Patent TIPS. Specific velocities recorded above. 3. Small volume abdominal ascites. 4. Hepatic morphologic features typical of cirrhosis. US GALLBLADDER RUQ    Result Date: 12/30/2022  Multiple cholelithiasis but no ultrasound evidence cholecystitis. Nondilated biliary tree. Cirrhosis. Patent TIPS. Ascites. Additional findings, as above. XR CHEST PORTABLE    Result Date: 1/3/2023  Increasing vascular congestion with small pleural effusions, suggestive of developing edema. XR CHEST PORTABLE    Result Date: 1/2/2023  Mild pulmonary vascular congestion without evidence of overt edema     XR CHEST PORTABLE    Result Date: 12/31/2022  Little change from prior study. Cardiomegaly, vascular congestion, effusions, atelectasis and possible superimposed acute airspace disease are again noted. XR CHEST PORTABLE    Result Date: 12/30/2022  Cardiomegaly with probable mild central vascular congestion and similar bilateral pleural effusions with compressive atelectasis; filtrate at either base a consideration. No pulmonary edema. IR US GUIDED PARACENTESIS    Result Date: 12/28/2022  Successful ultrasound-guided therapeutic and diagnostic paracentesis. IR REVISION TIPS    Result Date: 1/2/2023  TIPS stent reassessment showing 6 mm Hg portosystemic shunt; no significant variceal filling demonstrated, but successful left gastric vein embolization performed. TIPS stent angioplasty to 10 mm with excellent flow demonstrated.  The findings were discussed with Dr. Abdi Reid post procedure. MRI BRAIN WO CONTRAST    Result Date: 1/2/2023  No evidence of acute ischemia. New moderate-sized bilateral mastoid effusions. Small old lacune within the left basal ganglia. New low T1 signal within the marrow elements diffusely likely representing a marrow replacement process such as anemia. Physical Examination:        General appearance:  alert, cooperative and no distress  Mental Status:  oriented to person, place and time and normal affect  Eyes: :+scleral icterus.    Lungs:  clear to auscultation bilaterally, normal effort  Heart:  regular rate and rhythm, no murmur  Abdomen:  soft, nontender, nondistended, normal bowel sounds, no masses, +hepatomegaly, +splenomegaly no fluid wave  Extremities:  no edema, redness, tenderness in the calves  Skin:  no gross lesions, rashes, induration    Assessment:        Hospital Problems             Last Modified POA    Ascites due to alcoholic cirrhosis ([address] 93/09/2535 Yes    Shortness of breath 12/29/2022 Yes    Portal hypertension ([address] 12/29/2022 Yes    Esophageal varices with bleeding ([address] 12/29/2022 Yes    Other abnormalities of gait and mobility 12/29/2022 Yes    S/P TIPS (transjugular intrahepatic portosystemic shunt) 12/29/2022 Yes    Acute blood loss anemia 12/29/2022 Yes    Near syncope 12/29/2022 Yes    Bleeding gastric varices 12/29/2022 Yes    Hepatic encephalopathy 73/78/9667 Yes    Periumbilical abdominal pain 12/29/2022 Yes    Lezama's esophagus with dysplasia 12/30/2022 Yes    Encephalopathy acute 1/1/2023 Yes    CRP elevated 1/4/2023 Yes    Community acquired bilateral lower lobe pneumonia 1/4/2023 Yes    Acute hyperactive alcohol withdrawal delirium ([address] 12/30/2022 Yes    Decompensation of cirrhosis of liver ([address] 1/1/2023 Yes    DDD (degenerative disc disease), lumbar 12/29/2022 Yes    Acute upper GI hemorrhage 12/29/2022 Yes    Gastrointestinal hemorrhage with melena 12/29/2022 Yes Mastoid disorder, bilateral 12/31/2022 Yes    Overview Signed 12/31/2022  4:50 PM by Hany Leija MD     biLateral mastoid effusion          Plan:        Acute upper GI bleed due to decompensated alcoholic cirrhosis and gastric AVM-resolved   EGD showed large varices with moderate portal hypertensive gastropathy. Had 4.5 L removed with paracentesis. Received 3 units of blood total.   Continue Coreg. Add lasix/aldactone if sodium restriction does not work (can be done outpatient). Hepatic encephalopathy: continue actulose titrate to 3 loose BM daily  Debility: Needs PTOT   Pneumonia: Continue Levaquin until 1/7  PCM suggested: Dietary consulted, continue feeding.    PTOT    Dispo: need PTOT evaluation and possible placement d/c order placed 1/4  Twila Humphrey DO  1/5/2023  3:05 PM

## 2023-01-05 NOTE — PROGRESS NOTES
PALLIATIVE CARE PROGRESS NOTE     NAME:  Kyra Marie  MEDICAL RECORD NUMBER:  [id number]  AGE: 60 y.o. GENDER: male  : 1960  TODAY'S DATE:  2023  Room: [facility]0325-02    Reason For Consult:  Goals of care evaluation  Distress management  Symptom Management  Guidance and support  Facilitate communications  Assistance in coordinating care  Recommendations for the above    Plan      Palliative Interaction:  The palliative care service was able to meet with the patient this morning. He remains somewhat confused but appears to have improving mentation. He does remember our conversation from yesterday. States that he is still having loose stools. I was able to discuss with his ex-wife, Ijeoma Boyer, this afternoon. She did bring in paperwork that reflected that the patient had previously stated that he wanted to be a DNR CCA. She discussed this with him and he has elected to revoke that and remain a full code. She also states that she has paperwork that states that she is the healthcare power of  for [employer]. She states that she will bring this in tomorrow around noon. I have asked that an appointment be made for him to follow-up with us in the palliative care clinic, as he does have a life limiting illness and we need to continue to follow him over the next several months. We'll continue to follow. IMPRESSION/ PLAN  Symptom management/pain control    We feel the patient's symptoms are being controlled. Their current regimen has been reviewed by myself and discussed with the staff. We recommend adjusting their medications as follows:    Goals of care evaluation  The patient goals of care are improve or maintain function/quality of life, preserve independence/autonomy/control, and support for family/caregiver  Long discussion to ensure the patient's and family's understanding of goals of care, and theconcept of palliative care.     Code Status:  FULL CODE    Other Recommendations  Follow up in palliative care clinic. Wife to bring in paperwork regarding ACP paperwork at noon. History of Present Illness     HISTORY OF PRESENT ILLNESS:   The patient is a 60 y.o. male who [facility] for evaluation of GI bleeding. He has a very complicated GI history with a history of Lezama's esophagus/esophageal adenocarcinoma status post chemoradiation, decompensated alcoholic cirrhosis, and recurrent GI bleeding. GI, interventional radiology, infectious disease, internal medicine are involved in the patient's care. The patient underwent EGD on 1/3/2022 which revealed grade 1-2 varices in the distal esophagus without stigmata of bleeding with few AVMs in the cardia and mild diffuse portal hypertensive gastropathy. The patient was previously seen by our service on October 31, 2022. At that time, the patient wanted to continue with aggressive treatments. His ex-wife, Joesph Fish was his POA at that time. Palliative care has been consulted for goals of care discussion and CODE STATUS discussion.     OVERNIGHT EVENTS:  No acute events overnight  Patient hemodynamically stable  Afebrile      Vital Signs:  /60   Pulse 97   Temp 97.4 °F (36.3 °C) (Oral)   Resp 16   Ht 5' 10\" (1.778 m)   Wt 226 lb 13.7 oz (102.9 kg)   SpO2 95%   BMI 32.55 kg/m²     Pertinent Laboratory StudiesReviewed by Me Personally Today:  Lab Results   Component Value Date    WBC 5.2 01/04/2023    HGB 7.8 (L) 01/04/2023    HCT 26.8 (L) 01/04/2023    MCV 89.0 01/04/2023    PLT 94 (L) 01/04/2023     Lab Results   Component Value Date/Time     01/04/2023 03:04 AM    K 4.0 01/04/2023 03:04 AM     01/04/2023 03:04 AM    CO2 18 01/04/2023 03:04 AM    BUN 5 01/04/2023 03:04 AM    CREATININE 0.51 01/04/2023 03:04 AM    GLUCOSE 89 01/04/2023 03:04 AM    GLUCOSE 65 04/19/2012 11:30 AM    CALCIUM 7.5 01/04/2023 03:04 AM         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 ([address] Alcoholic cirrhosis of liver with ascites ([address] Anemia, Anxiety, Arthritis, Back pain, chronic, Lezama esophagus, BPH (benign prostatic hypertrophy), Cholelithiasis, Cirrhosis ([address] COVID-19, COVID-19 vaccine series completed, DDD (degenerative disc disease), lumbar, Depression, Esophageal cancer ([address] Esophageal varices ([address] Fatty liver, GERD (gastroesophageal reflux disease), GI bleed, Hep C w/o coma, chronic ([address] History of alcohol abuse, History of blood transfusion, History of colon polyps, History of tobacco abuse, [place] (hard of hearing), Hyperlipidemia, Hypertension, Hyponatremia, Hypotension, Mastoid disorder, bilateral, PONV (postoperative nausea and vomiting), Port-A-Cath in place, Portal hypertension ([address] Sciatica, Secondary esophageal varices ([address] Shortness of breath, Spinal stenosis, Stomach ulcer, Thrombocytopenia ([address] Tubular adenoma of colon, Vitamin D deficiency, and Wears glasses.     Family History   Problem Relation Age of Onset    Cancer Mother         pancreatic    Cancer Father         bone    Diabetes Sister     Asthma Brother     Allergies Brother         MULTIPLE       Social History     Tobacco Use    Smoking status: Former     Packs/day: 1.00     Years: 45.00     Pack years: 45.00     Types: Cigarettes     Quit date: 2017     Years since quittin.9    Smokeless tobacco: Never   Vaping Use    Vaping Use: Never used   Substance Use Topics    Alcohol use: Not Currently     Comment: Quit alcohol in 2019- heavier drinking prior to quitting    Drug use: Not Currently     Frequency: 1.0 times per week     Types: Cocaine     Comment: Cocaine- stopped spring 2016       REVIEW OF SYSTEMS  CONSTITUTIONAL:  negative for fevers, chills, sweats, fatigue, weight loss  HEENT:  negative for vision, hearing changes, runny nose, throat pain  RESPIRATORY:  negative for shortness of breath, cough, congestion, wheezing  CARDIOVASCULAR:  negative for chest pain, palpitations  GASTROINTESTINAL: Reports having frequent bowel movements.   Negative for nausea, vomiting, constipation, change in bowel habits, abdominal pain   GENITOURINARY:  negative for difficulty of urination, burning with urination, frequency   INTEGUMENT:  negative for rash, skin lesions, easy bruising   HEMATOLOGIC/LYMPHATIC:  negative for swelling/edema   ALLERGIC/IMMUNOLOGIC:  negative for urticaria , itching  ENDOCRINE:  negative increase in drinking, increase in urination, hot or cold intolerance  MUSCULOSKELETAL:  negative joint pains, muscle aches, swelling of joints  NEUROLOGICAL:  negative for headaches, dizziness, lightheadedness, numbness, pain, tingling extremities  BEHAVIOR/PSYCH:  negative for depression, anxiety     PHYSICAL ASSESSMENT:  General Appearance: alert, well appearing, and in no acute distress  Mental status: oriented to person, place, and time  Head: normocephalic, atraumatic  Eye: no icterus, redness, pupils equal and reactive, extraocular eye movements intact, conjunctiva clear  Ear: normal external ear, no discharge, hearing intact  Nose: no drainage noted  Mouth: mucous membranes moist  Neck: supple, no carotid bruits, thyroid not palpable  Lungs: Bilateral equal air entry, clear to ausculation, no wheezing, rales or rhonchi, normal effort  Cardiovascular: normal rate, regular rhythm, no murmur, gallop, rub  Abdomen: Soft, nontender, + distended, + ascites   Neurologic: Following commands, spontaneously moving all 4 extremities  Skin: No gross lesions, rashes, bruising or bleeding on exposed skin area  Extremities: peripheral pulses palpable, no pedal edema  Psych: Somewhat confused affect    Palliative Performance Scale:  ___100% Full ambulation; normal activity/No disease; full self-care; normal intake; LOC full  ___90% full ambulation; normal activity/some disease; full self-care; normal intake; LOC full  _x_80% ambulation full; normal activity with effort/some disease; full self-care; normal/reduced intake; LOC full  ___70% ambulation reduced; cannot do normal work/some disease; full self-care; normal/reduce intake; LOC full  ___60%  Ambulation reduced; Significant disease; Can't do hobbies/housework; intake normal or reduced; occasional assist; LOC full/confusion  ___50%  Mainly sit/lie; Extensive disease; Can't do any work; Considerable assist; intake normal or reduced; LOC full/confusion  ___40%  Mainly in bed; Extensive disease; Mainly assist; intake normal or reduced; LOC full/confusion   ___30%  Bed Bound; Extensive disease; Total care; intake reduced; LOC full/confusion  ___20%  Bed Bound; Extensive disease; Total care; intake minimal; Drowsy/coma  ___10%  Bed Bound; Extensive disease; Total care; Mouth care only; Drowsy/coma  ___0       Death    Principle Problem/Diagnosis:  Active Problems:    Ascites due to alcoholic cirrhosis (HCC)    Shortness of breath    Portal hypertension (HCC)    Esophageal varices with bleeding (HCC)    Other abnormalities of gait and mobility    S/P TIPS (transjugular intrahepatic portosystemic shunt)    Acute blood loss anemia    Near syncope    Bleeding gastric varices    Hepatic encephalopathy    Periumbilical abdominal pain    Lezama's esophagus with dysplasia    Encephalopathy acute    CRP elevated    Community acquired bilateral lower lobe pneumonia    Acute hyperactive alcohol withdrawal delirium ([facility]    Decompensation of cirrhosis of liver (HCC)    DDD (degenerative disc disease), lumbar    Acute upper GI hemorrhage    Gastrointestinal hemorrhage with melena    Mastoid disorder, bilateral        Electronically signed by      Rossi Paniagua DO  Hospice/Palliative Care Fellow  [facility] L[facility]pez, [place], [place]  1/5/2023 3:48 PM  Palliative Care Office [phone]      ATTENDING ATTESTATION:    I have discussed the care of Yaneth Akins, including pertinent history and exam findings, with the resident/fellow/NP.     I have seen and examined the patient and the key elements of all parts of the encounter have been performed by me. I agree with the assessment, plan and orders as documented by the fellow/resident/NP, after I modified the exam findings and the plan of treatments and the final version is my approved version of the assessment.         Electronically signed by Delilah Jarrett MD on 1/5/2023 at 3:58 PM

## 2023-01-05 NOTE — PROGRESS NOTES
Report called to [facility] on 3B. All clothes, shoes, glasses, phone bagged and sent with patient.

## 2023-01-05 NOTE — CARE COORDINATION
Transitional planning note: plan is SNF.  Referral pending to [facility].  call placed to alessio with [facility] @ [phone] and left vm message requesting update regarding status of referral.

## 2023-01-05 NOTE — PROGRESS NOTES
Physical Therapy        Physical Therapy Cancel Note      DATE: 2023    NAME: Daniela Bernal  MRN: [id number]   : 1960      Patient not seen this date for Physical Therapy due to:    Patient Declined: Pt refusing to participate in PT even after max encouragement, Will check back tomorrow.        Electronically signed by Eitan Vance PTA on 2023 at 3:48 PM

## 2023-01-05 NOTE — PROGRESS NOTES
SPIRITUAL CARE DEPARTMENT - [address] [address]  PROGRESS NOTE    Shift date: 1/5/2023  Shift day: Thursday   Shift # 1    Room # 0153/1241-35   Name: Baljeet Kumar                Protestant: [address] 32 of Religion: Unknown    Referral: Routine Visit    Admit Date & Time: 12/28/2022 11:30 PM    Assessment:  Baljeet Kumar is a 60 y.o. male in the hospital because of weakness. Upon entering the room writer observes patient lying in bed and awake. Two family members were in room with patient. Intervention:  Writer introduced self and title as  Writer offered space for patient  to express feelings, needs, and concerns and provided a ministry presence. Patient stated he was doing well. Writer offered 's services to family and patient if needed. Outcome:  Patient and family thanked writer for visit. Plan:  Chaplains will remain available to offer spiritual and emotional support as needed. Electronically signed by Harvinder Bingham on 1/5/2023 at 2:57 PM.  [facility]  [phone]       01/05/23 1446   Encounter Summary   Service Provided For: Patient and family together   Referral/Consult From: Other    Support System Significant other   Last Encounter  01/05/23   Complexity of Encounter Moderate   Begin Time 1443   End Time  1446   Total Time Calculated 3 min   Encounter    Type Follow up   Assessment/Intervention/Outcome   Assessment Anxious; Coping   Intervention Active listening;Sustaining Presence/Ministry of presence   Outcome Expressed Gratitude

## 2023-01-05 NOTE — PLAN OF CARE
Problem: Respiratory - Adult  Goal: Achieves optimal ventilation and oxygenation  1/5/2023 0818 by Jesusita Cruz RCP  Outcome: Progressing  BRONCHOSPASM/BRONCHOCONSTRICTION     [x]         IMPROVE AERATION/BREATH SOUNDS  [x]   ADMINISTER BRONCHODILATOR THERAPY AS APPROPRIATE  [x]   ASSESS BREATH SOUNDS  []   IMPLEMENT AEROSOL/MDI PROTOCOL  [x]   PATIENT EDUCATION AS NEEDED

## 2023-01-05 NOTE — PLAN OF CARE
Problem: Discharge Planning  Goal: Discharge to home or other facility with appropriate resources  1/5/2023 1811 by Chai Brantley RN  Outcome: Progressing  1/5/2023 1305 by Theresa Hoffman RN  Outcome: Progressing     Problem: Pain  Goal: Verbalizes/displays adequate comfort level or baseline comfort level  1/5/2023 1811 by Chai Brantley RN  Outcome: Progressing  1/5/2023 1305 by Theresa Hoffman RN  Outcome: Progressing     Problem: Respiratory - Adult  Goal: Achieves optimal ventilation and oxygenation  1/5/2023 1811 by Chai Brantley RN  Outcome: Progressing  1/5/2023 1305 by Theresa Hoffman RN  Outcome: Progressing  1/5/2023 0818 by Karina Navarrete RCP  Outcome: Progressing     Problem: Cardiovascular - Adult  Goal: Maintains optimal cardiac output and hemodynamic stability  1/5/2023 1811 by Chai Brantley RN  Outcome: Progressing  1/5/2023 1305 by Theresa Hoffman RN  Outcome: Progressing  Goal: Absence of cardiac dysrhythmias or at baseline  1/5/2023 1811 by Chai Brantley RN  Outcome: Progressing  1/5/2023 1305 by Theresa Hoffman RN  Outcome: Progressing     Problem: Musculoskeletal - Adult  Goal: Return mobility to safest level of function  1/5/2023 1811 by Chai Brantley RN  Outcome: Progressing  1/5/2023 1305 by Theresa Hoffman RN  Outcome: Progressing  Goal: Maintain proper alignment of affected body part  1/5/2023 1811 by Chai Brantley RN  Outcome: Progressing  1/5/2023 1305 by Theresa Hoffman RN  Outcome: Progressing  Goal: Return ADL status to a safe level of function  1/5/2023 1811 by Chai Brantley RN  Outcome: Progressing  1/5/2023 1305 by Theresa Hoffman RN  Outcome: Progressing     Problem: Gastrointestinal - Adult  Goal: Minimal or absence of nausea and vomiting  1/5/2023 1811 by Chai Brantley RN  Outcome: Progressing  1/5/2023 1305 by Theresa Hoffman RN  Outcome: Progressing  Goal: Maintains or returns to baseline bowel function  1/5/2023 1811 by Chai Brantley RN  Outcome: Progressing  1/5/2023  by Adrienne Gagnon RN  Outcome: Progressing     Problem: Hematologic - Adult  Goal: Maintains hematologic stability  1/5/2023 1811 by Philip Glover RN  Outcome: Progressing  1/5/2023 1305 by Adrienne Gagnon RN  Outcome: Progressing     Problem: Skin/Tissue Integrity  Goal: Absence of new skin breakdown  Description: 1. Monitor for areas of redness and/or skin breakdown  2. Assess vascular access sites hourly  3. Every 4-6 hours minimum:  Change oxygen saturation probe site  4. Every 4-6 hours:  If on nasal continuous positive airway pressure, respiratory therapy assess nares and determine need for appliance change or resting period.   1/5/2023 1811 by Philip Glover RN  Outcome: Progressing  1/5/2023 1305 by Adrienne Gagnon RN  Outcome: Progressing     Problem: Safety - Adult  Goal: Free from fall injury  1/5/2023 1811 by Philip Glover RN  Outcome: Progressing  1/5/2023 1305 by Adrienne Gagnon RN  Outcome: Progressing     Problem: ABCDS Injury Assessment  Goal: Absence of physical injury  1/5/2023 1811 by Philip Glover RN  Outcome: Progressing  1/5/2023 1305 by Adrienne Gagnon RN  Outcome: Progressing     Problem: Nutrition Deficit:  Goal: Optimize nutritional status  1/5/2023 1811 by Philip Glover RN  Outcome: Progressing  1/5/2023 1305 by Adrienne Gagnon RN  Outcome: Progressing

## 2023-01-06 ENCOUNTER — APPOINTMENT (OUTPATIENT)
Dept: ULTRASOUND IMAGING | Age: 63
End: 2023-01-06
Attending: INTERNAL MEDICINE
Payer: MEDICARE

## 2023-01-06 LAB
CULTURE: NORMAL
EKG ATRIAL RATE: 113 BPM
EKG P AXIS: 54 DEGREES
EKG P-R INTERVAL: 138 MS
EKG Q-T INTERVAL: 340 MS
EKG QRS DURATION: 76 MS
EKG QTC CALCULATION (BAZETT): 492 MS
EKG R AXIS: 59 DEGREES
EKG T AXIS: 44 DEGREES
EKG VENTRICULAR RATE: 126 BPM
GLUCOSE BLD-MCNC: 115 MG/DL (ref 75–110)
GLUCOSE BLD-MCNC: 119 MG/DL (ref 75–110)
GLUCOSE BLD-MCNC: 126 MG/DL (ref 75–110)
GLUCOSE BLD-MCNC: 81 MG/DL (ref 75–110)
Lab: NORMAL
SPECIMEN DESCRIPTION: NORMAL

## 2023-01-06 PROCEDURE — 6370000000 HC RX 637 (ALT 250 FOR IP): Performed by: NURSE PRACTITIONER

## 2023-01-06 PROCEDURE — 97116 GAIT TRAINING THERAPY: CPT

## 2023-01-06 PROCEDURE — 49083 ABD PARACENTESIS W/IMAGING: CPT

## 2023-01-06 PROCEDURE — 99232 SBSQ HOSP IP/OBS MODERATE 35: CPT | Performed by: INTERNAL MEDICINE

## 2023-01-06 PROCEDURE — 97110 THERAPEUTIC EXERCISES: CPT

## 2023-01-06 PROCEDURE — 1200000000 HC SEMI PRIVATE

## 2023-01-06 PROCEDURE — 82947 ASSAY GLUCOSE BLOOD QUANT: CPT

## 2023-01-06 PROCEDURE — 0W9G3ZZ DRAINAGE OF PERITONEAL CAVITY, PERCUTANEOUS APPROACH: ICD-10-PCS | Performed by: RADIOLOGY

## 2023-01-06 PROCEDURE — 6370000000 HC RX 637 (ALT 250 FOR IP): Performed by: INTERNAL MEDICINE

## 2023-01-06 PROCEDURE — 2580000003 HC RX 258: Performed by: NURSE PRACTITIONER

## 2023-01-06 RX ADMIN — POTASSIUM CHLORIDE 10 MEQ: 1500 TABLET, EXTENDED RELEASE ORAL at 21:30

## 2023-01-06 RX ADMIN — LEVOFLOXACIN 500 MG: 500 TABLET, FILM COATED ORAL at 09:03

## 2023-01-06 RX ADMIN — CARVEDILOL 3.12 MG: 3.12 TABLET, FILM COATED ORAL at 09:03

## 2023-01-06 RX ADMIN — SODIUM CHLORIDE, PRESERVATIVE FREE 10 ML: 5 INJECTION INTRAVENOUS at 21:31

## 2023-01-06 RX ADMIN — SODIUM CHLORIDE, PRESERVATIVE FREE 10 ML: 5 INJECTION INTRAVENOUS at 09:04

## 2023-01-06 RX ADMIN — PANTOPRAZOLE SODIUM 40 MG: 40 TABLET, DELAYED RELEASE ORAL at 09:03

## 2023-01-06 RX ADMIN — LACTULOSE 10 G: 20 SOLUTION ORAL at 14:17

## 2023-01-06 RX ADMIN — LACTULOSE 10 G: 20 SOLUTION ORAL at 09:03

## 2023-01-06 RX ADMIN — POTASSIUM CHLORIDE 10 MEQ: 1500 TABLET, EXTENDED RELEASE ORAL at 09:03

## 2023-01-06 ASSESSMENT — ENCOUNTER SYMPTOMS
COUGH: 0
ABDOMINAL DISTENTION: 1
COLOR CHANGE: 0
RHINORRHEA: 0
SHORTNESS OF BREATH: 1
EYE DISCHARGE: 0
EYE REDNESS: 0
ABDOMINAL PAIN: 0
APNEA: 0

## 2023-01-06 NOTE — PROGRESS NOTES
[facility]  Office: [address], DO, Sammy Rodrigo, DO, Wily Cunninghamul, DO, Juan C Aaron Sanchez, DO, Marcos Ledezma MD, Shari Lezama MD, Tatiana Guido MD, Alexei Horner MD,  Camryn So MD, Josué Alvarenga MD, Lindsay Naik, DO, Rhea Hernandez MD,  Juancarlos Lord MD, Ellie Crowell MD, Logan Torres DO, Mirta Mckeon MD, Jesus Javier MD, Fazal Carrillo DO, Charity Roman MD, Yohan Najera MD, Ritesh Allen MD, Paul Martini MD, Karl Ying DO, Zulma Waldron MD, Kathleen Curtis MD, Romayne Muscat, Serena Uriarte, CNP, Tip Ramirez, CNP, Aaron Connolly, CNP,  Nubia Burns, [facility], Manish Phillips, CNP, Maye Gerber, CNP, Jose Orozco, CNP, Osmel Palacio, CNP, Rebecca Charles, CNP, Julianne Mercedes PA-C, Nubia Loyd, CNS, Zion Argueta, CNP, Bhavik East, CNP         Rolf Krishnamurthy Mck[address]    Progress Note    1/6/2023    2:25 PM    Name:   Jeffrey Nelson  MRN:     [id number]     [place]:      [de-identified]   Room:   [address] [place] [address] Day:  9  Admit Date:  12/28/2022 11:30 PM    PCP:   VASU Salinas  Code Status:  Full Code    Subjective:     C/C:  bleeding   Interval History Status: Not changed    Pt having some distention today but otherwise feels well. No fevers or chills. He is having regular bowel /bladder movements. Brief History:     cirrhosis    Review of Systems:     Constitutional:  negative for chills, fevers, sweats feels good today  Respiratory:  negative for cough, dyspnea on exertion, shortness of breath, wheezing  Cardiovascular:  negative for chest pain, chest pressure/discomfort, lower extremity edema, palpitations  Gastrointestinal:  negative for abdominal pain, constipation, diarrhea, nausea, vomiting  Neurological:  negative for dizziness, headache    Medications:      Allergies:  No Known Allergies    Current Meds:   Scheduled Meds:    lactulose  10 g Oral TID carvedilol  3.125 mg Oral BID WC    pantoprazole  40 mg Oral QAM AC    levoFLOXacin  500 mg Oral Daily    insulin lispro  0.05 Units/kg SubCUTAneous TID WC    potassium chloride  10 mEq Oral BID    insulin lispro  0-4 Units SubCUTAneous TID WC    insulin lispro  0-4 Units SubCUTAneous Nightly    sodium chloride flush  5-40 mL IntraVENous 2 times per day    [Held by provider] baclofen  10 mg Oral TID    sodium chloride flush  5-40 mL IntraVENous 2 times per day     Continuous Infusions:    dextrose      dextrose      sodium chloride       PRN Meds: potassium chloride **OR** potassium alternative oral replacement **OR** potassium chloride, magnesium sulfate, sodium phosphate IVPB **OR** sodium phosphate IVPB **OR** sodium phosphate IVPB, glucose, dextrose bolus **OR** dextrose bolus, glucagon (rDNA), dextrose, glucose, dextrose bolus **OR** dextrose bolus, glucagon (rDNA), dextrose, sodium chloride flush, sodium chloride, sodium chloride flush, ondansetron **OR** ondansetron    Data:     Past Medical History:   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 ([facility] Alcoholic cirrhosis of liver with ascites ([facility] Anemia, Anxiety, Arthritis, Back pain, chronic, Lezama esophagus, BPH (benign prostatic hypertrophy), Cholelithiasis, Cirrhosis ([facility] COVID-19, COVID-19 vaccine series completed, DDD (degenerative disc disease), lumbar, Depression, Esophageal cancer ([facility] Esophageal varices ([facility] Fatty liver, GERD (gastroesophageal reflux disease), GI bleed, Hep C w/o coma, chronic ([facility] History of alcohol abuse, History of blood transfusion, History of colon polyps, History of tobacco abuse, [place] (hard of hearing), Hyperlipidemia, Hypertension, Hyponatremia, Hypotension, Mastoid disorder, bilateral, PONV (postoperative nausea and vomiting), Port-A-Cath in place, Portal hypertension ([facility] Sciatica, Secondary esophageal varices ([facility] Shortness of breath, Spinal stenosis, Stomach ulcer, Thrombocytopenia ([facility] Tubular adenoma of colon, Vitamin D deficiency, and Wears glasses. Social History:   reports that he quit smoking about 5 years ago. His smoking use included cigarettes. He has a 45.00 pack-year smoking history. He has never used smokeless tobacco. He reports that he does not currently use alcohol. He reports that he does not currently use drugs after having used the following drugs: Cocaine. Frequency: 1.00 time per week. Family History:   Family History   Problem Relation Age of Onset    Cancer Mother         pancreatic    Cancer Father         bone    Diabetes Sister     Asthma Brother     Allergies Brother         MULTIPLE       Vitals:  BP (!) 106/57   Pulse 82   Temp 98.9 °F (37.2 °C) (Oral)   Resp 18   Ht 5' 10\" (1.778 m)   Wt 226 lb 13.7 oz (102.9 kg)   SpO2 98%   BMI 32.55 kg/m²   Temp (24hrs), Av.3 °F (36.8 °C), Min:97.4 °F (36.3 °C), Max:98.9 °F (37.2 °C)    Recent Labs     23  1525 23  2038 23  0743 23  1217   POCGLU 96 101 81 115*         I/O (24Hr):     Intake/Output Summary (Last 24 hours) at 2023 1425  Last data filed at 2023 0855  Gross per 24 hour   Intake 200 ml   Output --   Net 200 ml         Labs:  Hematology:  Recent Labs     23  1652 239 23  030   WBC  --   --  5.2   RBC  --   --  3.01*   HGB 7.6* 8.0* 7.8*   HCT 25.2* 29.1* 26.8*   MCV  --   --  89.0   MCH  --   --  25.9   MCHC  --   --  29.1   RDW  --   --  20.5*   PLT  --   --  94*   MPV  --   --  10.0       Chemistry:  Recent Labs     23  030      K 4.0   *   CO2 18*   GLUCOSE 89   BUN 5*   CREATININE 0.51*   ANIONGAP 6*   LABGLOM >60   CALCIUM 7.5*       Recent Labs     23  0304 23  0803 23  0804 23  1129 23  1525 23  2038 23  0743 23  1217   PROT 3.9*  --   --   --   --   --   --   --    LABALBU 2.0*  --   --   --   --   --   --   --    *  --   --   --   --   --   --   --    ALT 32  --   -- --   --   --   --   --    ALKPHOS 122  --   --   --   --   --   --   --    BILITOT 4.6*  --   --   --   --   --   --   --    BILIDIR 2.8*  --   --   --   --   --   --   --    POCGLU  --    < > 85 89 96 101 81 115*    < > = values in this interval not displayed. ABG:  Lab Results   Component Value Date/Time    FIO2 INFORMATION NOT PROVIDED 12/30/2022 01:35 AM     Lab Results   Component Value Date/Time    SPECIAL RT HAND 5ML 01/01/2023 02:36 PM     Lab Results   Component Value Date/Time    CULTURE NO GROWTH 3 DAYS 01/03/2023 11:20 AM       Radiology:  XR CHEST (SINGLE VIEW FRONTAL)    Result Date: 12/27/2022  1. Minimal bibasilar atelectatic changes with trace left pleural fluid which is slightly decreased from 12/09/2022. XR RADIUS ULNA RIGHT (2 VIEWS)    Result Date: 1/1/2023  No fracture or dislocation. Mild soft tissue swelling     CT HEAD WO CONTRAST    Result Date: 12/31/2022  CT BRAIN: 1. No evidence of acute hemorrhage, large territorial hypodensity, significant mass effect/midline shift, or ventriculomegaly 2. Involutional parenchymal changes. 3. Age-indeterminate (likely chronic) left basal ganglia lacunar infarct. If clinically indicated, can consider further evaluation with MRI if no contraindications. CT CERVICAL SPINE: 1. No acute abnormality of the cervical spine. 2. Multilevel cervical spondylosis, most notable at C5-C6 and C6-C7. No high-grade bony spinal canal stenosis. CT CERVICAL SPINE WO CONTRAST    Result Date: 12/31/2022  CT BRAIN: 1. No evidence of acute hemorrhage, large territorial hypodensity, significant mass effect/midline shift, or ventriculomegaly 2. Involutional parenchymal changes. 3. Age-indeterminate (likely chronic) left basal ganglia lacunar infarct. If clinically indicated, can consider further evaluation with MRI if no contraindications. CT CERVICAL SPINE: 1. No acute abnormality of the cervical spine.  2. Multilevel cervical spondylosis, most notable at C5-C6 and C6-C7. No high-grade bony spinal canal stenosis. CT ABDOMEN PELVIS W IV CONTRAST Additional Contrast? Radiologist Recommendation    Result Date: 1/3/2023  1. There is a short segment of narrowing of the rectum near the rectosigmoid junction compared to the recent exam and may represent spasm rather than underlying mass. Fluid-filled loops of small bowel and colon suggesting diarrhea with enteritis or colitis. Evaluation of true wall thickening is limited by the diffuse edematous state. 2.  Cirrhotic liver with tips, ascites, and sequela of portal hypertension. 3.  Thickening of the gastric mucosal folds and into the distal esophagus. Correlate with prior history of malignancy. 4.  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 5.  Pleural effusions and pericardial effusion are redemonstrated. The opacified portions of the lower lungs appears to be from compressive atelectasis. CT ABDOMEN PELVIS W IV CONTRAST Additional Contrast? None    Result Date: 12/27/2022  Stable circumferential wall thickening of the distal esophagus and gastroesophageal junction consistent with known esophageal malignancy. No significant change since prior examination. No esophageal obstruction. Cirrhotic liver with associated portal venous hypertension, progressive ascites and stable splenomegaly. TIPS appears in place and patent Cholelithiasis Interval development of bibasal infiltrates and moderate bilateral pleural effusions as well as mild-to-moderate pericardial effusion     IR FLUORO GUIDED NEEDLE PLACEMENT    Result Date: 12/28/2022  Successful ultrasound-guided placement of a right upper extremity midline venous catheter. US LIVER    Result Date: 12/28/2022  1. Cholelithiasis without definite findings of acute cholecystitis. 2. Patent TIPS. Specific velocities recorded above. 3. Small volume abdominal ascites.  4. Hepatic morphologic features typical of cirrhosis. US GALLBLADDER RUQ    Result Date: 12/30/2022  Multiple cholelithiasis but no ultrasound evidence cholecystitis. Nondilated biliary tree. Cirrhosis. Patent TIPS. Ascites. Additional findings, as above. XR CHEST PORTABLE    Result Date: 1/3/2023  Increasing vascular congestion with small pleural effusions, suggestive of developing edema. XR CHEST PORTABLE    Result Date: 1/2/2023  Mild pulmonary vascular congestion without evidence of overt edema     XR CHEST PORTABLE    Result Date: 12/31/2022  Little change from prior study. Cardiomegaly, vascular congestion, effusions, atelectasis and possible superimposed acute airspace disease are again noted. XR CHEST PORTABLE    Result Date: 12/30/2022  Cardiomegaly with probable mild central vascular congestion and similar bilateral pleural effusions with compressive atelectasis; filtrate at either base a consideration. No pulmonary edema. IR US GUIDED PARACENTESIS    Result Date: 12/28/2022  Successful ultrasound-guided therapeutic and diagnostic paracentesis. IR REVISION TIPS    Result Date: 1/2/2023  TIPS stent reassessment showing 6 mm Hg portosystemic shunt; no significant variceal filling demonstrated, but successful left gastric vein embolization performed. TIPS stent angioplasty to 10 mm with excellent flow demonstrated. The findings were discussed with Dr. Marjan Barrett post procedure. MRI BRAIN WO CONTRAST    Result Date: 1/2/2023  No evidence of acute ischemia. New moderate-sized bilateral mastoid effusions. Small old lacune within the left basal ganglia. New low T1 signal within the marrow elements diffusely likely representing a marrow replacement process such as anemia. Physical Examination:        General appearance:  alert, cooperative and no distress  Mental Status:  oriented to person, place and time and normal affect  Eyes: :+scleral icterus.    Lungs:  clear to auscultation bilaterally, normal effort  Heart:  regular rate and rhythm, no murmur  Abdomen:  soft, nontender, nondistended, normal bowel sounds, no masses, +hepatomegaly, +splenomegaly no fluid wave  Extremities:  no edema, redness, tenderness in the calves  Skin:  no gross lesions, rashes, induration    Assessment:        Hospital Problems             Last Modified POA    Ascites due to alcoholic cirrhosis ([address] 98/66/2914 Yes    Shortness of breath 12/29/2022 Yes    Portal hypertension ([address] 12/29/2022 Yes    Esophageal varices with bleeding ([address] 12/29/2022 Yes    Other abnormalities of gait and mobility 12/29/2022 Yes    S/P TIPS (transjugular intrahepatic portosystemic shunt) 12/29/2022 Yes    Acute blood loss anemia 12/29/2022 Yes    Near syncope 12/29/2022 Yes    Bleeding gastric varices 12/29/2022 Yes    Hepatic encephalopathy 64/99/6832 Yes    Periumbilical abdominal pain 12/29/2022 Yes    Lezama's esophagus with dysplasia 12/30/2022 Yes    Encephalopathy acute 1/1/2023 Yes    CRP elevated 1/4/2023 Yes    Community acquired bilateral lower lobe pneumonia 1/4/2023 Yes    Acute hyperactive alcohol withdrawal delirium ([address] 12/30/2022 Yes    Decompensation of cirrhosis of liver ([address] 1/1/2023 Yes    DDD (degenerative disc disease), lumbar 12/29/2022 Yes    Acute upper GI hemorrhage 12/29/2022 Yes    Gastrointestinal hemorrhage with melena 12/29/2022 Yes    Mastoid disorder, bilateral 12/31/2022 Yes    Overview Signed 12/31/2022  4:50 PM by Sherron David MD     biLateral mastoid effusion        Plan:        Acute upper GI bleed due to decompensated alcoholic cirrhosis and gastric AVM-resolved   EGD showed large varices with moderate portal hypertensive gastropathy. Add lasix/aldactone if sodium restriction /Tips does not work (can be done outpatient). Order ultrasound to evaluate for ascites  Will need to see GI outpt to determine need/frequency of paracentesis.    Hepatic encephalopathy: continue actulose titrate to 3 loose BM daily  Debility: Needs PTOT   Pneumonia: Continue Levaquin until 1/7  PCM suggested: Dietary consulted, continue feeding.    PTOT    Dispo: need PTOT evaluation and possible placement d/c order placed 1/4  Markie Noble DO  1/6/2023  2:25 PM

## 2023-01-06 NOTE — PLAN OF CARE
- Patient's POA is indeed Dipti. Paperwork is being scanned into the chart. - He does have a living will and POA. - He wishes to remain a FULL CODE moving forward. - We will be seeing him in palliative care clinic in February.      Dino Qiu, DO  Hospice and Palliative Care Medicine Fellow

## 2023-01-06 NOTE — PROGRESS NOTES
[facility] -   Infectious diseases evaluation  admission date 12/28/2022    reason for consultation:   Possible SBP    Impression :   Current:  Bilateral pleural effusions   bilateral atelectasis, vs pneumonia   small pericardial effusion  Cirrhosis post TIPS, with ascites and esophageal varices -treated hepatitis C  Elevated CRP  1/3 rectal bleed - EGD    Other:  History of esophageal cancer/Lezama's esophagus  Discussion / summary of stay / plan of care     Recommendations   Treating bilateral lower lobe consolidations possibly pneumonia,  Post cefepime / vanco since 1/1  Levaquin 5 more days 1/3/23 until 1/7/23  rectal bleed, EGD 1/3 no bleed  Ascites - paracenthesis 1/6- 1200 ml  SOB       Infection Control Recommendations   [place] Precautions  Contact Isolation     Antimicrobial Stewardship Recommendations   Simplification of therapy  Targeted therapy      History of Present Illness:   Initial history:  Richard Goff is a 60y.o.-year-old male presented to the hospital 12/29 because of weakness fatigue recurrent upper GI bleed with melena and what seems to be anemia at the time. Admitted for GI work-up, he was having bloody stools/melena. Initially seen at [facility] started on octreotide, transferred to [facility] for reevaluation of the hips    CT abdomen showed rectal spasm otherwise with fluid in the rectum. TIPS was apparently patent, enthesitis    Ascites sample 12/28 was culture negative, And the number of WBCs was not suggestive of infection. A repeat paracentesis was performed 1/3, cultures and studies pending    CT abdomen and pelvis suggested a possible bilateral lower lobe atelectasis, upon review they could potentially be pneumonia.   Infectious disease consulted to manage antibiotics, the patient has been started on broad antibiotics on 1/1    Interval changes  1/6/2023   Patient Vitals for the past 8 hrs:   BP Temp Temp src Pulse Resp SpO2   01/06/23 0836 118/64 98.9 °F (37.2 °C) Oral 80 16 93 %   01/06/23 0420 120/69 98.5 °F (36.9 °C) Oral 82 18 95 %     1/3/23  Paracenthesis 1/3 2200ml yellow clear fluid  WBC 90 and cx pend - doubt infected    Had EGD 1/3 and shows no bleed, grade 1-2 varices distal esophagus and few AVMs and mild diffuse portal HTN    1/5/23  Increased shortness of breath, he is on room air at this point, abdomen distended,  Tips have been revisited on 12/30 1/6/23  Paracentesis today  Tomorrow will be last day of levaquin  He is still SOB  but on RA  Eating ok-     Summary of relevant labs:  Labs:  Procalcitonin0.15 High    CRP31.8 High    ALT32U/L AZC829 High    Creatinine0.51 Low    WBC 5.2  Micro:  Ascites  cx neg 12/28  Imaging:  CXR 1/3/23  Increasing vascular congestion with small pleural effusions, suggestive of   developing edema. Echo  Left ventricle is normal in size. Global left ventricular systolic function   is normal. Calculated ejection fraction 60% by Heart Model. No significant valvular regurgitation or stenosis seen. Moderate circumferential pericardial effusion seen. Measures 2.2cm   anteriorly and 2.26cm posteriorly. Clot-like structure noted in anterior pericardial space. No signs of tamponade. Consider clinical correlation. CTAP  1/3/23  There is a short segment of narrowing of the rectum near the rectosigmoid   junction compared to the recent exam and may represent spasm rather than   underlying mass. Fluid-filled loops of small bowel and colon suggesting   diarrhea with enteritis or colitis. Evaluation of true wall thickening is   limited by the diffuse edematous state. 2.  Cirrhotic liver with tips, ascites, and sequela of portal hypertension. 3.  Thickening of the gastric mucosal folds and into the distal esophagus. Correlate with prior history of malignancy.      4.  Calcified thrombosed splenic artery aneurysm in the left upper quadrant   measuring up to 3.1 cm but stable from previous exams. 5.  Pleural effusions and pericardial effusion are redemonstrated. The   opacified portions of the lower lungs appears to be from compressive   atelectasis. I have personally reviewed the past medical history, past surgical history, medications, social history, and family history, and I haveupdated the database accordingly. Allergies:   Patient has no known allergies. Review of Systems:     Review of Systems   Constitutional:  Positive for activity change. Negative for diaphoresis and fatigue. HENT:  Negative for congestion and rhinorrhea. Eyes:  Negative for discharge and redness. Respiratory:  Positive for shortness of breath. Negative for apnea and cough. Cardiovascular:  Positive for leg swelling. Negative for chest pain. Gastrointestinal:  Positive for abdominal distention. Negative for abdominal pain. Endocrine: Negative for cold intolerance and polyphagia. Genitourinary:  Negative for difficulty urinating, hematuria and urgency. Musculoskeletal:  Negative for arthralgias. Skin:  Negative for color change. Allergic/Immunologic: Negative for immunocompromised state. Neurological:  Negative for dizziness, seizures and numbness. Hematological:  Negative for adenopathy. Psychiatric/Behavioral:  Negative for agitation and confusion. Physical Examination :       Physical Exam  Constitutional:       Appearance: Normal appearance. He is not ill-appearing. HENT:      Head: Normocephalic and atraumatic. Nose: Nose normal. No congestion or rhinorrhea. Mouth/Throat:      Mouth: Mucous membranes are moist.      Pharynx: No oropharyngeal exudate. Eyes:      General: No scleral icterus. Conjunctiva/sclera: Conjunctivae normal.   Cardiovascular:      Rate and Rhythm: Normal rate and regular rhythm. Heart sounds: Normal heart sounds. No murmur heard. Pulmonary:      Breath sounds: No stridor. No rhonchi.    Abdominal: General: There is distension. Palpations: There is no mass. Tenderness: There is no abdominal tenderness. There is no guarding. Hernia: No hernia is present. Genitourinary:     Rectum: Guaiac result negative. Comments: Urine dark yellow  Musculoskeletal:         General: No swelling, tenderness, deformity or signs of injury. Cervical back: Neck supple. No rigidity. Skin:     Capillary Refill: Capillary refill takes less than 2 seconds. Coloration: Skin is not jaundiced. Findings: Bruising present. Neurological:      Mental Status: He is alert. Cranial Nerves: No cranial nerve deficit. Psychiatric:         Mood and Affect: Mood normal.         Thought Content:  Thought content normal.       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 Silvano Davies, orthopedic, every 3-4 months, gets steroid injection    Lezama esophagus     BPH (benign prostatic hypertrophy)     Cholelithiasis     Cirrhosis ([address]     COVID-19 12/2020    pt reports he had a positive test while at [facility] in 2020, was asymptomatic    COVID-19 vaccine series completed 5/20/2021, 6/22/2021    Moderna 5/20/2021, 6/22/2021    DDD (degenerative disc disease), lumbar     Depression     Esophageal cancer ([address]     INVASIVE ADENOCARCINOMA ARISING IN TUBULAR ADENOMA WITH HIGH GRADE DYSPLASIA, ASSOCIATED WITH FOCAL INTESTINAL METAPLASIA     Esophageal varices ([address]     Fatty liver     GERD (gastroesophageal reflux disease)     GI bleed     Hep C w/o coma, chronic ([address]     History of alcohol abuse     6-12 beers a day; quit drinking 2019    History of blood transfusion     History of colon polyps 2016    History of tobacco abuse     [place] (hard of hearing)     Hyperlipidemia     Hypertension     Hyponatremia 07/20/2016    Hypotension 12/20/2021    Mastoid disorder, bilateral 12/31/2022    biLateral mastoid effusion    PONV (postoperative nausea and vomiting)     Port-A-Cath in place     right upper chest    Portal hypertension (HCC)     Sciatica     Secondary esophageal varices ([facility] 06/07/2022    Shortness of breath     Spinal stenosis     Stomach ulcer     hx of    Thrombocytopenia ([facility] 12/23/2020    Tubular adenoma of colon 2016, 2018    Vitamin D deficiency     Wears glasses        Past Surgical  History:     Past Surgical History:   Procedure Laterality Date    BUNIONECTOMY      twice on right side    BUNIONECTOMY Left     CARPAL TUNNEL RELEASE Right     COLONOSCOPY      at age 35    COLONOSCOPY  10/05/2016    polyps-pathology tubular adenoma, and abnormal looking mucosa right colon-pathology-tubular adenoma    COLONOSCOPY N/A 03/30/2018    COLONOSCOPY POLYPECTOMY COLD BIOPSY performed by Fina Hong MD at [place] [address]  03/30/2018    Small polyp in the sigmoid colon and excised with biopsy forceps--tubular adenoma    COLONOSCOPY N/A 04/16/2022    COLONOSCOPY POLYPECTOMY performed by Fina Hong MD at [place] [address], COLON, DIAGNOSTIC      EGD    ESOPHAGOGASTRODUODENOSCOPY  12/29/2022    ESOPHAGOGASTRODUODENOSCOPY N/A 01/03/2023    IR PORT PLACEMENT EQUAL OR GREATER THAN 5 YEARS  04/19/2021    IR PORT PLACEMENT EQUAL OR GREATER THAN 5 YEARS 4/19/2021 [facility]    IR TIPS INSERTION  12/01/2022    IR TIPS INSERTION 12/1/2022 Marlin Olmedo MD [facility]    KNEE SURGERY Left     cyst removed    NASAL SEPTUM SURGERY      NERVE BLOCK Right 11/23/2020    NERVE BLOCK RIGHT CERVICAL STEROID INJECTION  C3-C6 performed by Nick Soler MD at [facility]  01/04/2016    steroid injection C7 T1    OTHER SURGICAL HISTORY  11/21/2016    Bilateral Lumbar CACHORRO L4-L5 injections    OTHER SURGICAL HISTORY  12/19/2016    lumbar steroid injection    OTHER SURGICAL HISTORY 09/28/2018    BILATERAL L5 CACHORRO (N/A Back)    OTHER SURGICAL HISTORY Right 11/23/2020    cervical injection    PAIN MANAGEMENT PROCEDURE Left 07/09/2020    EPIDURAL STEROID INJECTION LEFT L4 L5 performed by Alexy Lentz MD at [address] Left 07/20/2020    LEFT L4 L5 EPIDURAL STEROID INJECTION performed by Alexy Lentz MD at [address] Bilateral 08/17/2020    LUMBAR FACET BILATERAL L2-L5 performed by Alexy Lentz MD at [address] Bilateral 12/07/2020    NERVE BLOCK BILATERAL LUMBAR MEDIAL BRANCH L2-L5 performed by Alexy Lentz MD at [facility] NEUROPLASTY &/TRANSPOS MEDIAN NRV CARPAL Jessy Dulac Right 08/29/2017    CARPAL TUNNEL RELEASE RIGHT performed by Antonietta Graf MD at [address] &/TRANSPOS MEDIAN NRV CARPAL TUNNE Left 10/31/2017    CARPAL TUNNEL RELEASE performed by Antonietta Graf MD at [facility] [address] AA&/STRD TFRML EPI LUMBAR/SACRAL 1 LEVEL Bilateral 09/06/2018    BILATERAL L5 CACHORRO performed by Alexy Lentz MD at [facility] [address] AA&/STRD TFRML EPI LUMBAR/SACRAL 1 LEVEL N/A 09/28/2018    BILATERAL L5 CACHORRO performed by Alexy Lentz MD at [facility] [address] N/A 12/29/2020    EGD BIOPSY performed by Devin Trejo MD at [address] [place] [address] 02/02/2021    EGD BIOPSY and spot marking performed by Tomás Ojeda MD at [address] [place] [address] N/A 02/12/2021    ENDOSCOPIC ULTRASOUND, EGD performed by Wesley Jackson MD at [facility] 1  02/12/2021    EGD DIAGNOSTIC ONLY performed by Wesley Jackson MD at [facility] 1 08/31/2021    EGD BIOPSY performed by Tomás Ojeda MD at [address] [place] [address] 01/21/2022    EGD BIOPSY performed by Tomás Ojeda MD at [facility] GASTROINTESTINAL ENDOSCOPY N/A 04/15/2022    EGD ESOPHAGOGASTRODUODENOSCOPY performed by Dea Shaw MD at [address] [place] [address] N/A 06/06/2022    EGD BAND LIGATION performed by Martínez Quigley MD at [address] [place] [address] N/A 06/09/2022    EGD ESOPHAGOGASTRODUODENOSCOPY performed by Martínez Quigley MD at [address] [place] [address] N/A 09/14/2022    EGD ESOPHAGOGASTRODUODENOSCOPY ENDOSCOPIC APC AT [place] [address] performed by Bita Kan MD at [address] [place] [address] N/A 10/19/2022    EGD BIOPSY performed by Kristy Cooper MD at [place] [address]. 10/31/2022    EGD with APC performed by Kristy Cooper MD at [address] [place] [address]  12/29/2022    EGD ESOPHAGOGASTRODUODENOSCOPY performed by Noemi Ortiz MD at [place] [address]. 1/3/2023    EGD ESOPHAGOGASTRODUODENOSCOPY performed by Gilda Claros MD at [address] [place] [address]         Medications:      lactulose  10 g Oral TID    carvedilol  3.125 mg Oral BID WC    pantoprazole  40 mg Oral QAM AC    levoFLOXacin  500 mg Oral Daily    insulin lispro  0.05 Units/kg SubCUTAneous TID WC    potassium chloride  10 mEq Oral BID    insulin lispro  0-4 Units SubCUTAneous TID WC    insulin lispro  0-4 Units SubCUTAneous Nightly    sodium chloride flush  5-40 mL IntraVENous 2 times per day    [Held by provider] baclofen  10 mg Oral TID    sodium chloride flush  5-40 mL IntraVENous 2 times per day       Social History:     Social History     Socioeconomic History    Marital status: Single     Spouse name: Not on file    Number of children: Not on file    Years of education: Not on file    Highest education level: Not on file   Occupational History    Not on file   Tobacco Use    Smoking status: Former     Packs/day: 1.00     Years: 45.00     Pack years: 45.00     Types: Cigarettes Quit date: 2017     Years since quittin.9    Smokeless tobacco: Never   Vaping Use    Vaping Use: Never used   Substance and Sexual Activity    Alcohol use: Not Currently     Comment: Quit alcohol in 2019- heavier drinking prior to quitting    Drug use: Not Currently     Frequency: 1.0 times per week     Types: Cocaine     Comment: Cocaine- stopped spring 2016    Sexual activity: Yes     Partners: Female   Other Topics Concern    Not on file   Social History Narrative     in the past, retired     Social Determinants of Health     Financial Resource Strain: Low Risk     Difficulty of Paying Living Expenses: Not hard at all   Food Insecurity: No Food Insecurity    Worried About [address] [employer] in the Last Year: Never true    [address] [facility] [address] of Food in the Last Year: Never true   Transportation Needs: Not on file   Physical Activity: Inactive    Days of Exercise per Week: 0 days    Minutes of Exercise per Session: 0 min   Stress: Not on file   Social Connections: Not on file   Intimate Partner Violence: Not on file   Housing Stability: Not on file       Family History:     Family History   Problem Relation Age of Onset    Cancer Mother         pancreatic    Cancer Father         bone    Diabetes Sister     Asthma Brother     Allergies Brother         MULTIPLE      Medical Decision Making:   I have independently reviewed/ordered the following labs:    CBC with Differential:   Recent Labs     23   WBC  --  5.2   HGB 8.0* 7.8*   HCT 29.1* 26.8*   PLT  --  94*   LYMPHOPCT  --  8*   MONOPCT  --  16*       BMP:  Recent Labs     23      K 4.0   *   CO2 18*   BUN 5*   CREATININE 0.51*       Hepatic Function Panel:   Recent Labs     23   PROT 3.9*   LABALBU 2.0*   BILIDIR 2.8*   IBILI 1.8*   BILITOT 4.6*   ALKPHOS 122   ALT 32   *       No results for input(s): RPR in the last 72 hours. No results for input(s): HIV in the last 72 hours.   No results for input(s): BC in the last 72 hours. Lab Results   Component Value Date/Time    CREATININE 0.51 01/04/2023 03:04 AM    GLUCOSE 89 01/04/2023 03:04 AM    GLUCOSE 65 04/19/2012 11:30 AM       Detailed results: Thank you for allowing us to participate in the care of this patient. Please call with questions. This note is created with the assistance of a speech recognition program.  While intending to generate adocument that actually reflects the content of the visit, the document can still have some errors including those of syntax and sound a like substitutions which may escape proof reading. It such instances, actual meaningcan be extrapolated by contextual diversion. [id number] [facility] Student  Office: [phone]  Perfect serve / office [phone]        I have discussed the care of the patient, including pertinent history and exam findings,  with the resident. I have seen and examined the patient and the key elements of all parts of the encounter have been performed by me. I agree with the assessment, plan and orders as documented by the resident.     Sangita Oconnor, Infectious Diseases

## 2023-01-06 NOTE — PROGRESS NOTES
Physical Therapy  Facility/Department: [facility] RENAL//MED SURG  Daily Treatment Note    Name: Maricruz Rangel  : 1960  MRN: [id number]  Date of Service: 2023    Discharge Recommendations:  Patient would benefit from continued therapy after discharge   PT Equipment Recommendations  Equipment Needed: No (pt states he has a walker)      Patient Diagnosis(es): The encounter diagnosis was Decompensation of cirrhosis of liver ([address] 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 ([address] Alcoholic cirrhosis of liver with ascites ([address] Anemia, Anxiety, Arthritis, Back pain, chronic, Lezama esophagus, BPH (benign prostatic hypertrophy), Cholelithiasis, Cirrhosis ([address] COVID-19, COVID-19 vaccine series completed, DDD (degenerative disc disease), lumbar, Depression, Esophageal cancer ([address] Esophageal varices ([address] Fatty liver, GERD (gastroesophageal reflux disease), GI bleed, Hep C w/o coma, chronic ([address] History of alcohol abuse, History of blood transfusion, History of colon polyps, History of tobacco abuse, [place] (hard of hearing), Hyperlipidemia, Hypertension, Hyponatremia, Hypotension, Mastoid disorder, bilateral, PONV (postoperative nausea and vomiting), Port-A-Cath in place, Portal hypertension ([address] Sciatica, Secondary esophageal varices ([address] Shortness of breath, Spinal stenosis, Stomach ulcer, Thrombocytopenia ([address] Tubular adenoma of colon, Vitamin D deficiency, and Wears glasses. Past Surgical History:  has a past surgical history that includes Bunionectomy; Nasal septum surgery; other surgical history (2016); Colonoscopy; Colonoscopy (10/05/2016); other surgical history (2016); other surgical history (2016); knee surgery (Left); Bunionectomy (Left); Endoscopy, colon, diagnostic; pr neuroplasty &/transpos median nrv carpal tunne (Right, 2017);  Carpal tunnel release (Right); pr neuroplasty &/transpos median nrv carpal tunne (Left, 10/31/2017); Colonoscopy (N/A, 03/30/2018); Colonoscopy (03/30/2018); pr njx aa&/strd tfrml epi lumbar/sacral 1 level (Bilateral, 09/06/2018); other surgical history (09/28/2018); pr njx aa&/strd tfrml epi lumbar/sacral 1 level (N/A, 09/28/2018); Pain management procedure (Left, 07/09/2020); Pain management procedure (Left, 07/20/2020); Pain management procedure (Bilateral, 08/17/2020); other surgical history (Right, 11/23/2020); Nerve Block (Right, 11/23/2020); Pain management procedure (Bilateral, 12/07/2020); Upper gastrointestinal endoscopy (N/A, 12/29/2020); Upper gastrointestinal endoscopy (N/A, 02/02/2021); Upper gastrointestinal endoscopy (N/A, 02/12/2021); Upper gastrointestinal endoscopy (02/12/2021); K[place]aid tooth extraction; IR PORT PLACEMENT > 5 YEARS (04/19/2021); Upper gastrointestinal endoscopy (N/A, 08/31/2021); Upper gastrointestinal endoscopy (N/A, 01/21/2022); Upper gastrointestinal endoscopy (N/A, 04/15/2022); Colonoscopy (N/A, 04/16/2022); Upper gastrointestinal endoscopy (N/A, 06/06/2022); Upper gastrointestinal endoscopy (N/A, 06/09/2022); Paracentesis; Upper gastrointestinal endoscopy (N/A, 09/14/2022); Upper gastrointestinal endoscopy (N/A, 10/19/2022); Upper gastrointestinal endoscopy (N/A, 10/31/2022); IR TIPS INSERTION (12/01/2022); Esophagogastroduodenoscopy (12/29/2022); Upper gastrointestinal endoscopy (12/29/2022); Esophagogastroduodenoscopy (N/A, 01/03/2023); and Upper gastrointestinal endoscopy (N/A, 1/3/2023). Assessment   Body Structures, Functions, Activity Limitations Requiring Skilled Therapeutic Intervention: Decreased functional mobility ; Decreased strength; Increased pain;Decreased posture;Decreased cognition;Decreased ROM; Decreased endurance;Decreased balance  Assessment: Pt ambulated 80 ft total (40 ft x 2 trials with seated rest period in between trials s/t slight dizziness) with RW CGA. Pt able to maintain sitting EOB with supervision.  Pt is a fall risk and will require 24hr support upon discharge due to cognition deficits and level of assistance required to safely perform functional mobility. Recommending continued skilled physical therapy to address these deficits and return pt to prior level of function. Therapy Prognosis: Good  Activity Tolerance  Activity Tolerance: Patient tolerated treatment well     Plan   Physcial Therapy Plan  General Plan:  (5-6x/wk)  Current Treatment Recommendations: Strengthening, ROM, Balance training, Functional mobility training, Transfer training, Endurance training, Gait training, Stair training, Safety education & training, Patient/Caregiver education & training, Equipment evaluation, education, & procurement, Therapeutic activities, Home exercise program  Safety Devices  Type of Devices: Nurse notified, Call light within reach, Patient at risk for falls, Gait belt, Chair alarm in place, Left in chair  Restraints  Restraints Initially in Place: No     Restrictions  Restrictions/Precautions  Restrictions/Precautions: Fall Risk  Required Braces or Orthoses?: No  Position Activity Restriction  Other position/activity restrictions: s/p TIPS revision 12/30/22     Subjective   General  Chart Reviewed: Yes  Response To Previous Treatment: Patient with no complaints from previous session. Family / Caregiver Present: No  General Comment  Comments: Pt retired to recliner with chair alarm and call light. RN notified. Subjective  Subjective: RN and pt in agreement for PT. Pt supine in bed upon PT arrival.  Pt pleasant and cooperative, but occasionally agitated about care he is receiving. Pt having no c/o pain. Pt states he is having a paracentesis sometime today.             Cognition   Orientation  Overall Orientation Status: Within Functional Limits  Cognition  Overall Cognitive Status: WFL     Objective   Bed mobility  Supine to Sit: Supervision (HOB slightly elevated)  Sit to Supine: Unable to assess (pt retired to recliner)  Scooting: Supervision  Bed Mobility Comments: assessed with HOB slightly elevated. Pt able to maintain sitting EOB with supervision  Transfers  Sit to Stand: Stand by assistance  Stand to Sit: Stand by assistance  Bed to Chair: Contact guard assistance  Comment: assessed with RW support with verbal cues for UE placement with good understanding and fair carryover  Ambulation  Surface: Level tile  Device: Rolling Walker  Assistance: Contact guard assistance  Gait Deviations: Increased SALVATORE; Decreased step length;Decreased step height;Slow Shannon  Distance: 40 ft, seated rest period, 40 ft  Comments: pt limited by slight dizziness but motivated to walk. Balance  Posture: Fair  Sitting - Static: Good  Sitting - Dynamic: Good  Standing - Static: Fair  Standing - Dynamic: Fair  Comments: Standing balance assessed with RW. Exercise Treatment: Seated LE exercise program: Long Arc Quads, hip abduction/adduction, heel/toe raises, and marches. Reps:  x 10 reps        OutComes Score  AM-PAC Score  AM-PAC Inpatient Mobility Raw Score : 18 (01/06/23 1132)  -PAC Inpatient T-Scale Score : 43.63 (01/06/23 1132)  Mobility Inpatient CMS 0-100% Score: 46.58 (01/06/23 1132)  Mobility Inpatient CMS G-Code Modifier : CK (01/06/23 1132)     Goals  Short Term Goals  Time Frame for Short Term Goals: 14  Short Term Goal 1: Pt to perform bed mobility independently  Short Term Goal 2: Demonstrate functional transfer independently  Short Term Goal 3: Pt to ambulate 100ft w/ least restrictive AD independently  Short Term Goal 4: Tolerate 30 minutes of therapy to demo increased endurance  Patient Goals   Patient Goals :  To go home       Education  Patient Education  Education Given To: Patient  Education Provided: Role of Therapy;Plan of Care  Education Provided Comments: education with gait training and importance of mobility  Education Method: Demonstration;Verbal  Barriers to Learning: None  Education Outcome: Verbalized understanding;Demonstrated understanding      Therapy Time   Individual Concurrent Group Co-treatment   Time In 3320         Time Out 1128         Minutes 30         Timed Code Treatment Minutes: 21 Minutes       Jesika Rivera PTA

## 2023-01-06 NOTE — PROGRESS NOTES
Pt arrives to room for para  JR LEONE and NISHANT RDMS to bedside  Site prepped and draped   Access obtained and draining  Tolerated well  Access removed and site covered with dsd  Return to floor  1.2L removed

## 2023-01-06 NOTE — BRIEF OP NOTE
Brief Postoperative Note for Paracentesis    Sandy Alfaro  YOB: 1960  [id number]    Pre-operative Diagnosis:  Ascites     Post-operative Diagnosis: Same    Procedure: Ultrasound guided Paracentesis     Anesthesia: 1% Lidocaine     Surgeons/Assistants: Mildred Odom PA-C    Complications: none    EBL: Minimal    Specimens: Were obtained    Ultrasound guided paracentesis performed. 1200 ml clear yellow fluid obtained. Dressing applied.      Electronically signed by VASU Yadav on 1/6/2023 at 4:16 PM

## 2023-01-06 NOTE — PROGRESS NOTES
[facility])  Occupational Therapy Not Seen Note    DATE: 2023    NAME: Yan Griffin  MRN: [id number]   : 1960      Patient not seen this date for Occupational Therapy due to:    Patient Declined: Pt adamantly declines OT treatment despite encouragement until after paratenesis. Pt informed an updated therapy note is needed as soon as possible however continues to decline.  Will re-attempt        Electronically signed by STACEY Enrique on 2023 at 2:29 PM

## 2023-01-06 NOTE — PLAN OF CARE
Problem: Discharge Planning  Goal: Discharge to home or other facility with appropriate resources  1/6/2023 1833 by Navin Ospina, RN  Outcome: Progressing  1/6/2023 1833 by Navin Ospina, RN  Outcome: Progressing  1/6/2023 0617 by Doretha Hagen  Outcome: Progressing     Problem: Pain  Goal: Verbalizes/displays adequate comfort level or baseline comfort level  1/6/2023 1833 by Navin Ospina, RN  Outcome: Progressing  1/6/2023 1833 by Navin Ospina, RN  Outcome: Progressing  1/6/2023 0617 by Doretha Hagen  Outcome: Progressing     Problem: Respiratory - Adult  Goal: Achieves optimal ventilation and oxygenation  1/6/2023 1833 by Navin Ospina, RN  Outcome: Progressing  1/6/2023 1833 by Navin Ospina, RN  Outcome: Progressing  1/6/2023 0617 by Doretha Hagen  Outcome: Progressing     Problem: Cardiovascular - Adult  Goal: Maintains optimal cardiac output and hemodynamic stability  1/6/2023 1833 by Navin Ospina, RN  Outcome: Progressing  1/6/2023 1833 by Navin Ospina, RN  Outcome: Progressing  1/6/2023 0617 by Doretha Hagen  Outcome: Progressing  Goal: Absence of cardiac dysrhythmias or at baseline  1/6/2023 1833 by Navin Ospina, RN  Outcome: Progressing  1/6/2023 1833 by Navin Ospina, RN  Outcome: Progressing  1/6/2023 0617 by Doretha Hagen  Outcome: Progressing     Problem: Musculoskeletal - Adult  Goal: Return mobility to safest level of function  1/6/2023 1833 by Navin Ospina, RN  Outcome: Progressing  1/6/2023 1833 by Navin Ospina, RN  Outcome: Progressing  1/6/2023 0617 by Doretha Hagen  Outcome: Progressing  Goal: Maintain proper alignment of affected body part  1/6/2023 1833 by Navin Ospina, RN  Outcome: Progressing  1/6/2023 1833 by Navin Ospina, RN  Outcome: Progressing  1/6/2023 0617 by Doretha Hagen  Outcome: Progressing  Goal: Return ADL status to a safe level of function  1/6/2023 1833 by Navin Ospina, RN  Outcome: Progressing  1/6/2023 1833 by Navin Ospina, RN  Outcome: Progressing  1/6/2023 0617 by Doretha Hagen  Outcome: Progressing     Problem: Gastrointestinal - Adult  Goal: Minimal or absence of nausea and vomiting  1/6/2023 1833 by Genaro Early RN  Outcome: Progressing  1/6/2023 1833 by Genaro Early RN  Outcome: Progressing  1/6/2023 0617 by Santhosh Valentin  Outcome: Progressing  Goal: Maintains or returns to baseline bowel function  1/6/2023 1833 by Genaro Early RN  Outcome: Progressing  1/6/2023 1833 by Genaro Early RN  Outcome: Progressing  1/6/2023 0617 by Santhosh Valentin  Outcome: Progressing     Problem: Hematologic - Adult  Goal: Maintains hematologic stability  1/6/2023 1833 by Genaro Ealry RN  Outcome: Progressing  1/6/2023 1833 by Genaro Early RN  Outcome: Progressing  1/6/2023 0617 by Santhosh Valentin  Outcome: Progressing     Problem: Skin/Tissue Integrity  Goal: Absence of new skin breakdown  Description: 1. Monitor for areas of redness and/or skin breakdown  2. Assess vascular access sites hourly  3. Every 4-6 hours minimum:  Change oxygen saturation probe site  4. Every 4-6 hours:  If on nasal continuous positive airway pressure, respiratory therapy assess nares and determine need for appliance change or resting period.   1/6/2023 1833 by Genaro Early RN  Outcome: Progressing  1/6/2023 1833 by Genaro Early RN  Outcome: Progressing  1/6/2023 0617 by Santhosh Valentin  Outcome: Progressing     Problem: Safety - Adult  Goal: Free from fall injury  1/6/2023 1833 by Genaro Early RN  Outcome: Progressing  1/6/2023 1833 by Genaro Early RN  Outcome: Progressing  1/6/2023 0617 by Santhosh Valentin  Outcome: Progressing     Problem: ABCDS Injury Assessment  Goal: Absence of physical injury  1/6/2023 1833 by Genaro Early RN  Outcome: Progressing  1/6/2023 1833 by Genaro Early RN  Outcome: Progressing  1/6/2023 0617 by Santhosh Valentin  Outcome: Progressing     Problem: Nutrition Deficit:  Goal: Optimize nutritional status  1/6/2023 1833 by Genaro Early RN  Outcome: Progressing  1/6/2023 1833 by Genaro Early RN  Outcome: Progressing  1/6/2023 0617 by Santhosh Valentin  Outcome: Progressing

## 2023-01-06 NOTE — PLAN OF CARE
Problem: Discharge Planning  Goal: Discharge to home or other facility with appropriate resources  1/6/2023 0617 by Miladys Jackson  Outcome: Progressing  1/5/2023 1811 by Marleny Dorantes RN  Outcome: Progressing     Problem: Pain  Goal: Verbalizes/displays adequate comfort level or baseline comfort level  1/6/2023 0617 by Miladys Jackson  Outcome: Progressing  1/5/2023 1811 by Marleny Dorantes RN  Outcome: Progressing     Problem: Respiratory - Adult  Goal: Achieves optimal ventilation and oxygenation  1/6/2023 0617 by Miladys Jackson  Outcome: Progressing  1/5/2023 1811 by Marleny Dorantes RN  Outcome: Progressing     Problem: Cardiovascular - Adult  Goal: Maintains optimal cardiac output and hemodynamic stability  1/6/2023 0617 by Miladys Jackson  Outcome: Progressing  1/5/2023 1811 by Marleny Dorantes RN  Outcome: Progressing  Goal: Absence of cardiac dysrhythmias or at baseline  1/6/2023 0617 by Miladys Jackson  Outcome: Progressing  1/5/2023 1811 by Marleny Dorantes RN  Outcome: Progressing     Problem: Musculoskeletal - Adult  Goal: Return mobility to safest level of function  1/6/2023 0617 by Miladys Jackson  Outcome: Progressing  1/5/2023 1811 by Marleny Dorantes RN  Outcome: Progressing  Goal: Maintain proper alignment of affected body part  1/6/2023 0617 by Miladys Jackson  Outcome: Progressing  1/5/2023 1811 by Marleny Dorantes RN  Outcome: Progressing  Goal: Return ADL status to a safe level of function  1/6/2023 0617 by Miladys Jackson  Outcome: Progressing  1/5/2023 1811 by Marleny Dorantes RN  Outcome: Progressing

## 2023-01-06 NOTE — PROGRESS NOTES
[facility] -   Infectious diseases evaluation  admission date 12/28/2022    reason for consultation:   Possible SBP    Impression :   Current:  Bilateral pleural effusions   bilateral atelectasis, vs pneumonia   small pericardial effusion  Cirrhosis post TIPS, with ascites and esophageal varices -treated hepatitis C  Elevated CRP  1/3 rectal bleed - EGD    Other:  History of esophageal cancer/Lezama's esophagus  Discussion / summary of stay / plan of care     Recommendations   Treating bilateral lower lobe consolidations possibly pneumonia,  Post cefepime / vanco since 1/1  Levaquin 5 more days 1/3/23 until 1/7/23  Due to continuous rectal bleed, EGD 1/3 no bleed  Disc w medicine    Infection Control Recommendations   [place] Precautions  Contact Isolation     Antimicrobial Stewardship Recommendations   Simplification of therapy  Targeted therapy      History of Present Illness:   Initial history:  Kyra Marie is a 60y.o.-year-old male presented to the hospital 12/29 because of weakness fatigue recurrent upper GI bleed with melena and what seems to be anemia at the time. Admitted for GI work-up, he was having bloody stools/melena. Initially seen at [facility] started on octreotide, transferred to [facility] for reevaluation of the hips    CT abdomen showed rectal spasm otherwise with fluid in the rectum. TIPS was apparently patent, enthesitis    Ascites sample 12/28 was culture negative, And the number of WBCs was not suggestive of infection. A repeat paracentesis was performed 1/3, cultures and studies pending    CT abdomen and pelvis suggested a possible bilateral lower lobe atelectasis, upon review they could potentially be pneumonia.   Infectious disease consulted to manage antibiotics, the patient has been started on broad antibiotics on 1/1    Interval changes  1/5/2023   Patient Vitals for the past 8 hrs:   BP Temp Temp src Pulse Resp SpO2 01/05/23 2035 106/66 98.4 °F (36.9 °C) Oral 86 20 93 %   01/05/23 1636 (!) 101/52 -- -- 83 16 --   01/05/23 1522 108/60 97.4 °F (36.3 °C) Oral 97 16 95 %     1/3/23  Paracenthesis 1/3 2200ml yellow clear fluid  WBC 90 and cx pend - doubt infected    Had EGD 1/3 and shows no bleed, grade 1-2 varices distal esophagus and few AVMs and mild diffuse portal HTN    1/5/23  Increased shortness of breath, he is on room air at this point, abdomen distended,  Tips have been revisited on 12/30    Summary of relevant labs:  Labs:  Procalcitonin0.15 High    CRP31.8 High    ALT32U/L [id number] High    Creatinine0.51 Low    WBC 5.2  Micro:  Ascites  cx neg 12/28  Imaging:  CXR 1/3/23  Increasing vascular congestion with small pleural effusions, suggestive of   developing edema. Echo  Left ventricle is normal in size. Global left ventricular systolic function   is normal. Calculated ejection fraction 60% by Heart Model. No significant valvular regurgitation or stenosis seen. Moderate circumferential pericardial effusion seen. Measures 2.2cm   anteriorly and 2.26cm posteriorly. Clot-like structure noted in anterior pericardial space. No signs of tamponade. Consider clinical correlation. CTAP  1/3/23  There is a short segment of narrowing of the rectum near the rectosigmoid   junction compared to the recent exam and may represent spasm rather than   underlying mass. Fluid-filled loops of small bowel and colon suggesting   diarrhea with enteritis or colitis. Evaluation of true wall thickening is   limited by the diffuse edematous state. 2.  Cirrhotic liver with tips, ascites, and sequela of portal hypertension. 3.  Thickening of the gastric mucosal folds and into the distal esophagus. Correlate with prior history of malignancy. 4.  Calcified thrombosed splenic artery aneurysm in the left upper quadrant   measuring up to 3.1 cm but stable from previous exams.      5.  Pleural effusions and pericardial effusion are redemonstrated. The   opacified portions of the lower lungs appears to be from compressive   atelectasis. I have personally reviewed the past medical history, past surgical history, medications, social history, and family history, and I haveupdated the database accordingly. Allergies:   Patient has no known allergies. Review of Systems:     Review of Systems   Constitutional:  Positive for activity change. HENT:  Negative for congestion and rhinorrhea. Eyes:  Negative for discharge and redness. Respiratory:  Positive for cough and shortness of breath. Negative for apnea. Cardiovascular:  Positive for leg swelling. Negative for chest pain. Gastrointestinal:  Positive for abdominal distention. Negative for abdominal pain. Endocrine: Negative for cold intolerance and polyphagia. Genitourinary:  Negative for difficulty urinating, hematuria and urgency. Musculoskeletal:  Negative for arthralgias. Skin:  Negative for color change. Allergic/Immunologic: Negative for immunocompromised state. Neurological:  Negative for dizziness, seizures and numbness. Hematological:  Negative for adenopathy. Psychiatric/Behavioral:  Negative for agitation and confusion. Physical Examination :       Physical Exam  Constitutional:       Appearance: Normal appearance. He is not ill-appearing. HENT:      Head: Normocephalic and atraumatic. Nose: Nose normal. No congestion. Mouth/Throat:      Mouth: Mucous membranes are moist.      Pharynx: No oropharyngeal exudate. Eyes:      General: No scleral icterus. Conjunctiva/sclera: Conjunctivae normal.   Cardiovascular:      Rate and Rhythm: Normal rate and regular rhythm. Heart sounds: Normal heart sounds. No murmur heard. Pulmonary:      Breath sounds: No stridor. No rhonchi. Abdominal:      General: There is distension. Palpations: There is no mass. Tenderness: There is no abdominal tenderness. Hernia: No hernia is present. Genitourinary:     Rectum: Guaiac result negative. Comments: Urine dark yellow  Musculoskeletal:         General: No swelling, tenderness, deformity or signs of injury. Cervical back: Neck supple. No rigidity. Skin:     Capillary Refill: Capillary refill takes less than 2 seconds. Coloration: Skin is not jaundiced. Findings: Bruising present. Neurological:      Mental Status: He is alert. Cranial Nerves: No cranial nerve deficit. Psychiatric:         Mood and Affect: Mood normal.         Thought Content:  Thought content normal.       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 Ana Webb, orthopedic, every 3-4 months, gets steroid injection    Lezama esophagus     BPH (benign prostatic hypertrophy)     Cholelithiasis     Cirrhosis ([address]     COVID-19 12/2020    pt reports he had a positive test while at [facility] in 2020, was asymptomatic    COVID-19 vaccine series completed 5/20/2021, 6/22/2021    Moderna 5/20/2021, 6/22/2021    DDD (degenerative disc disease), lumbar     Depression     Esophageal cancer ([address]     INVASIVE ADENOCARCINOMA ARISING IN TUBULAR ADENOMA WITH HIGH GRADE DYSPLASIA, ASSOCIATED WITH FOCAL INTESTINAL METAPLASIA     Esophageal varices ([address]     Fatty liver     GERD (gastroesophageal reflux disease)     GI bleed     Hep C w/o coma, chronic ([address]     History of alcohol abuse     6-12 beers a day; quit drinking 2019    History of blood transfusion     History of colon polyps 2016    History of tobacco abuse     [place] (hard of hearing)     Hyperlipidemia     Hypertension     Hyponatremia 07/20/2016    Hypotension 12/20/2021    Mastoid disorder, bilateral 12/31/2022    biLateral mastoid effusion    PONV (postoperative nausea and vomiting)     Port-A-Cath in place     right upper chest    Portal hypertension ([address]     Sciatica     Secondary esophageal varices ([address] 06/07/2022    Shortness of breath     Spinal stenosis     Stomach ulcer     hx of    Thrombocytopenia ([address] 12/23/2020    Tubular adenoma of colon 2016, 2018    Vitamin D deficiency     Wears glasses        Past Surgical  History:     Past Surgical History:   Procedure Laterality Date    BUNIONECTOMY      twice on right side    BUNIONECTOMY Left     CARPAL TUNNEL RELEASE Right     COLONOSCOPY      at age 35    COLONOSCOPY  10/05/2016    polyps-pathology tubular adenoma, and abnormal looking mucosa right colon-pathology-tubular adenoma    COLONOSCOPY N/A 03/30/2018    COLONOSCOPY POLYPECTOMY COLD BIOPSY performed by Sariah French MD at [address],[facility]  03/30/2018    Small polyp in the sigmoid colon and excised with biopsy forceps--tubular adenoma    COLONOSCOPY N/A 04/16/2022    COLONOSCOPY POLYPECTOMY performed by Sariah French MD at [facility], DIAGNOSTIC      EGD    ESOPHAGOGASTRODUODENOSCOPY  12/29/2022    ESOPHAGOGASTRODUODENOSCOPY N/A 01/03/2023    IR PORT PLACEMENT EQUAL OR GREATER THAN 5 YEARS  04/19/2021    IR PORT PLACEMENT EQUAL OR GREATER THAN 5 YEARS 4/19/2021 [facility]    IR TIPS INSERTION  12/01/2022    IR TIPS INSERTION 12/1/2022 Irene Javed MD [facility]    KNEE SURGERY Left     cyst removed    NASAL SEPTUM SURGERY      NERVE BLOCK Right 11/23/2020    NERVE BLOCK RIGHT CERVICAL STEROID INJECTION  C3-C6 performed by Erasmo Kelsey MD at [facility]  01/04/2016    steroid injection C7 T1    OTHER SURGICAL HISTORY  11/21/2016    Bilateral Lumbar CACHORRO L4-L5 injections    OTHER SURGICAL HISTORY  12/19/2016    lumbar steroid injection    OTHER SURGICAL HISTORY  09/28/2018    BILATERAL L5 CACHORRO (N/A Back)    OTHER SURGICAL HISTORY Right 11/23/2020    cervical injection    PAIN MANAGEMENT PROCEDURE Left 07/09/2020    EPIDURAL STEROID INJECTION LEFT L4 L5 performed by Rodrigo Lundberg MD at [facility] Left 07/20/2020    LEFT L4 L5 EPIDURAL STEROID INJECTION performed by Rodrigo Lundberg MD at [facility] Bilateral 08/17/2020    LUMBAR FACET BILATERAL L2-L5 performed by Rodrigo Lundberg MD at [facility] Bilateral 12/07/2020    NERVE BLOCK BILATERAL LUMBAR MEDIAL BRANCH L2-L5 [facility] by Rodrigo Lundberg MD [facility] [address] NEUROPLASTY &/TRANSPOS MEDIAN NRV CARPAL Broderick Ramin Right 08/29/2017    CARPAL TUNNEL RELEASE RIGHT performed by María Elena Vivas MD at [address] &/TRANSPOS MEDIAN NRV CARPAL TUNNE Left 10/31/2017    CARPAL TUNNEL RELEASE performed by María Elena Vivas MD at [facility] AA&/STRD TFRML EPI LUMBAR/SACRAL 1 LEVEL Bilateral 09/06/2018    BILATERAL L5 CACHORRO performed by Rodrigo Lundberg MD at [facility] AA&/STRD TFRML EPI LUMBAR/SACRAL 1 LEVEL N/A 09/28/2018    BILATERAL L5 CACHORRO performed by Rodrigo Lundberg MD at [facility]. 106 N/A 12/29/2020    EGD BIOPSY performed by Ruthann Walker MD at [address] [place] [address] 02/02/2021    EGD BIOPSY and spot marking performed by Sean Baker MD at [address] [place] [address] N/A 02/12/2021    ENDOSCOPIC ULTRASOUND, EGD performed by Ximena Joyce MD at [facility] 1  02/12/2021    EGD DIAGNOSTIC ONLY performed by Ximena Joyce MD at [facility] 1 08/31/2021    EGD BIOPSY performed by Sean Baker MD at [address] [place] [address] 01/21/2022    EGD BIOPSY performed by Sean Baker MD at [address] [place] [address] N/A 04/15/2022    EGD ESOPHAGOGASTRODUODENOSCOPY performed by Ruthann Walker MD at [facility]. 106 N/A 2022    EGD BAND LIGATION performed by Tre Fowler MD at [address] [place] [address] N/A 2022    EGD ESOPHAGOGASTRODUODENOSCOPY performed by Tre Fowler MD at [address] [place] [address] N/A 2022    EGD ESOPHAGOGASTRODUODENOSCOPY ENDOSCOPIC APC AT GE JUNCTION performed by Tierra Santoyo MD at [address] [place] [address] N/A 10/19/2022    EGD BIOPSY performed by Keith Rosenberg MD at [address] [place] [address] N/A 10/31/2022    EGD with APC performed by Keith Rosenberg MD at [address] [place] [address]  2022    EGD ESOPHAGOGASTRODUODENOSCOPY performed by Brian Marroquin MD at [address] [place] [address] 1/3/2023    EGD ESOPHAGOGASTRODUODENOSCOPY performed by Daljit Olivares MD at [address] [facility]         Medications:      lactulose  10 g Oral TID    carvedilol  3.125 mg Oral BID WC    pantoprazole  40 mg Oral QAM AC    levoFLOXacin  500 mg Oral Daily    insulin lispro  0.05 Units/kg SubCUTAneous TID WC    potassium chloride  10 mEq Oral BID    insulin lispro  0-4 Units SubCUTAneous TID WC    insulin lispro  0-4 Units SubCUTAneous Nightly    sodium chloride flush  5-40 mL IntraVENous 2 times per day    [Held by provider] baclofen  10 mg Oral TID    sodium chloride flush  5-40 mL IntraVENous 2 times per day       Social History:     Social History     Socioeconomic History    Marital status: Single     Spouse name: Not on file    Number of children: Not on file    Years of education: Not on file    Highest education level: Not on file   Occupational History    Not on file   Tobacco Use    Smoking status: Former     Packs/day: 1.00     Years: 45.00     Pack years: 45.00     Types: Cigarettes     Quit date: 2017     Years since quittin.9    Smokeless tobacco: Never   Vaping Use    Vaping Use: Never used   Substance and Sexual Activity Alcohol use: Not Currently     Comment: Quit alcohol in 2019- heavier drinking prior to quitting    Drug use: Not Currently     Frequency: 1.0 times per week     Types: Cocaine     Comment: Cocaine- stopped spring 2016    Sexual activity: Yes     Partners: Female   Other Topics Concern    Not on file   Social History Narrative     in the past, retired     Social Determinants of Health     Financial Resource Strain: Low Risk     Difficulty of Paying Living Expenses: Not hard at all   Food Insecurity: No Food Insecurity    Worried About [address] in the Last Year: Never true    [address] in the Last Year: Never true   Transportation Needs: Not on file   Physical Activity: Inactive    Days of Exercise per Week: 0 days    Minutes of Exercise per Session: 0 min   Stress: Not on file   Social Connections: Not on file   Intimate Partner Violence: Not on file   Housing Stability: Not on file       Family History:     Family History   Problem Relation Age of Onset    Cancer Mother         pancreatic    Cancer Father         bone    Diabetes Sister     Asthma Brother     Allergies Brother         MULTIPLE      Medical Decision Making:   I have independently reviewed/ordered the following labs:    CBC with Differential:   Recent Labs     01/03/23  0557 01/03/23  1652 01/03/23  2129 01/04/23  0304   WBC 4.8  --   --  5.2   HGB 7.2*   < > 8.0* 7.8*   HCT 24.9*   < > 29.1* 26.8*   PLT 70*  --   --  94*   LYMPHOPCT 4*  --   --  8*   MONOPCT 10*  --   --  16*    < > = values in this interval not displayed.        BMP:  Recent Labs     01/03/23  0557 01/04/23  0304    135   K 3.3* 4.0   * 111*   CO2 21 18*   BUN 4* 5*   CREATININE 0.40* 0.51*   MG 2.1  --        Hepatic Function Panel:   Recent Labs     01/03/23  0557 01/04/23  0304   PROT 4.0* 3.9*   LABALBU 2.3* 2.0*   BILIDIR  --  2.8*   IBILI  --  1.8*   BILITOT 5.0* 4.6*   ALKPHOS 95 122   ALT 30 32   * 127*       No results for input(s): RPR in the last 72 hours. No results for input(s): HIV in the last 72 hours. No results for input(s): BC in the last 72 hours. Lab Results   Component Value Date/Time    CREATININE 0.51 01/04/2023 03:04 AM    GLUCOSE 89 01/04/2023 03:04 AM    GLUCOSE 65 04/19/2012 11:30 AM       Detailed results: Thank you for allowing us to participate in the care of this patient. Please call with questions. This note is created with the assistance of a speech recognition program.  While intending to generate adocument that actually reflects the content of the visit, the document can still have some errors including those of syntax and sound a like substitutions which may escape proof reading. It such instances, actual meaningcan be extrapolated by contextual diversion. Violetta Clark MD  Medical Student  Office: [phone]  Perfect serve / office [phone]        I have discussed the care of the patient, including pertinent history and exam findings,  with the resident. I have seen and examined the patient and the key elements of all parts of the encounter have been performed by me. I agree with the assessment, plan and orders as documented by the resident.     Sangita Oconnor, Infectious Diseases

## 2023-01-06 NOTE — PROGRESS NOTES
.. PALLIATIVE CARE TEAM    Patient: Darling Nguyen  Room: 6142/9382-72    Reason For Consult   Goals of care evaluation  Distress management  Symptom Management  Guidance and support  Facilitate communications  Assistance in coordinating care  Recommendations for the above    Impression: Draling Nguyen is a 60y.o. year old male with  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 ([address] Alcoholic cirrhosis of liver with ascites ([address] Anemia, Anxiety, Arthritis, Back pain, chronic, Lezama esophagus, BPH (benign prostatic hypertrophy), Cholelithiasis, Cirrhosis ([address] COVID-19, COVID-19 vaccine series completed, DDD (degenerative disc disease), lumbar, Depression, Esophageal cancer ([address] Esophageal varices ([address] Fatty liver, GERD (gastroesophageal reflux disease), GI bleed, Hep C w/o coma, chronic ([address] History of alcohol abuse, History of blood transfusion, History of colon polyps, History of tobacco abuse, [place] (hard of hearing), Hyperlipidemia, Hypertension, Hyponatremia, Hypotension, Mastoid disorder, bilateral, PONV (postoperative nausea and vomiting), Port-A-Cath in place, Portal hypertension ([address] Sciatica, Secondary esophageal varices ([address] Shortness of breath, Spinal stenosis, Stomach ulcer, Thrombocytopenia ([address] Tubular adenoma of colon, Vitamin D deficiency, and Wears glasses. .  Currently hospitalized for the management of Shortness of breath . The Palliative Care Team is following to assist with goals of care and patient/family support.      Code Status  Full Code  PLAN:   - patient is alert and oriented, he is jaundice and has a paracentesis planned today   - Patient is a full code, and we talk about the contents of his living will as it is end of life wishes, and I talk about other aggressive treatments that he will have if needed and if those are his wishes  - patient to follow at the [facility] outpatient palliative care clinic after discharge  - Patient will go to rehab and goal is to return home independently   - patient is a full code  - will follow for goals of care and support.    Vital Signs:  BP (!) 106/57   Pulse 82   Temp 98.9 °F (37.2 °C) (Oral)   Resp 18   Ht 5' 10\" (1.778 m)   Wt 226 lb 13.7 oz (102.9 kg)   SpO2 98%   BMI 32.55 kg/m²     Patient Active Problem List   Diagnosis    Back pain, chronic    Hearing difficulty    GERD (gastroesophageal reflux disease)    Cervical radicular pain    Acute hyperactive alcohol withdrawal delirium (HCC)    Hypomagnesemia    Chronic viral hepatitis B without delta agent and without coma (HCC)    Calculus of gallbladder without cholecystitis    Hep C w/o coma, chronic (HCC)    Fatty liver    Psychophysiologic insomnia    Cirrhosis (HCC)    Decompensation of cirrhosis of liver (HCC)    Vertebrogenic low back pain    DDD (degenerative disc disease), lumbar    Depression    Tubular adenoma of colon    History of colon polyps    Gynecomastia, male    Lumbar radiculitis    Lumbar disc herniation    Tinnitus    Eustachian tube dysfunction    Ganglion cyst    Carpal tunnel syndrome of right wrist    History of hepatitis C    Vitamin D deficiency    Pure hypercholesterolemia    Hypokalemia    Essential hypertension    Recurrent major depressive disorder in partial remission (HCC)    S/P epidural steroid injection    Elevated LFTs    Seasonal allergies    Lumbar facet arthropathy    Cervical facet syndrome    Acute upper GI hemorrhage    Thrombocytopenia (HCC)    Hepatitis C virus infection resolved after antiviral drug therapy    Gastrointestinal hemorrhage with melena    Alcohol abuse    Altered mental status    Hypocalcemia    Hypophosphatemia    Malignant neoplasm of lower third of esophagus (HCC)    COVID-19    Anxiety    Current smoker    Severe comorbid illness    Gait instability    Abnormal findings on diagnostic imaging of spine    Cervical spinal stenosis    Spinal stenosis of lumbar region with neurogenic claudication    Low hemoglobin Symptomatic anemia, microcytic, acute    Hypotension    Former smoker, 50+ pack years, quit 2016    HLD (hyperlipidemia)    Esophageal adenocarcinoma [facility]    Anemia    Acute kidney injury ([facility]    Ascites due to alcoholic cirrhosis (HCC)    Shortness of breath    Drop in hemoglobin    Portal hypertension ([facility]    Esophageal varices with bleeding (HCC)    Esophageal polyp    Acute kidney failure, unspecified (HCC)    Muscle weakness (generalized)    Other abnormalities of gait and mobility    GI bleed    Goals of care, counseling/discussion    ACP (advance care planning)    Palliative care encounter    S/P TIPS (transjugular intrahepatic portosystemic shunt)    Acute blood loss anemia    Near syncope    Bleeding gastric varices    Hepatic encephalopathy    Periumbilical abdominal pain    Lezama's esophagus with dysplasia    Mastoid disorder, bilateral    Encephalopathy acute    CRP elevated    Community acquired bilateral lower lobe pneumonia       Palliative Interaction:Patient is in bed and he is on room air. He is jaundice and edematous arms and hands. He is to have a paracentesis done today. His ex-wife [facility], and her  are at the bedside. I introduce myself as Dr. Freeman Miranda is as well at the bedside. I explain that we as well will be at the palliative care clinic at [address]. We made an appointment at the [address] [place] [address] on Feb 1st at 11:00am.     Peyton Shah has the patient;s HCPOA, and living will. I explain in detail what his living will states, and that it is end of life wishes only. I as well explain what treatments he would want and what his wishes are until that time IE: resuscitation, ventilator, trach, peg , dialysis? The patient states\" she knows that I don't want to be a vegetable. \" The patient states\" it's up to her.' I tell the patient that his wishes are up to him and that Nilam's job is to be his voice and honor his wishes when he cannot speak for himself. I ask about the ventilator, and the patient and Nilam state\" we would try it, but it would not be long term. \"     We talk at length and I answer their questions. I offer much emotional support to the patient, ex-wife Anette and her  at the bedside. I copy the HCPOA and Living will and place in paper chart. I update the bedside nurse as well. Will follow for goals of care and patient/family support.            Electronically signed by   Leandro Wolff RN  Palliative Care Team  on 1/6/2023 at 1:09 PM

## 2023-01-06 NOTE — CARE COORDINATION
[address] is following for placement but they need clarification on Paracentesis.   Will the be scheduled on a regular basis,

## 2023-01-07 ENCOUNTER — APPOINTMENT (OUTPATIENT)
Dept: CT IMAGING | Age: 63
End: 2023-01-07
Attending: INTERNAL MEDICINE
Payer: MEDICARE

## 2023-01-07 PROBLEM — I82.621 ACUTE DEEP VEIN THROMBOSIS (DVT) OF BRACHIAL VEIN OF RIGHT UPPER EXTREMITY (HCC): Status: ACTIVE | Noted: 2023-01-07

## 2023-01-07 LAB
ANION GAP SERPL CALCULATED.3IONS-SCNC: 7 MMOL/L (ref 9–17)
BUN BLDV-MCNC: 6 MG/DL (ref 8–23)
CALCIUM SERPL-MCNC: 8 MG/DL (ref 8.6–10.4)
CHLORIDE BLD-SCNC: 108 MMOL/L (ref 98–107)
CO2: 20 MMOL/L (ref 20–31)
CREAT SERPL-MCNC: 0.45 MG/DL (ref 0.7–1.2)
GFR SERPL CREATININE-BSD FRML MDRD: >60 ML/MIN/1.73M2
GLUCOSE BLD-MCNC: 105 MG/DL (ref 75–110)
GLUCOSE BLD-MCNC: 109 MG/DL (ref 75–110)
GLUCOSE BLD-MCNC: 114 MG/DL (ref 75–110)
GLUCOSE BLD-MCNC: 119 MG/DL (ref 70–99)
GLUCOSE BLD-MCNC: 97 MG/DL (ref 75–110)
MAGNESIUM: 1.3 MG/DL (ref 1.6–2.6)
POTASSIUM SERPL-SCNC: 3.5 MMOL/L (ref 3.7–5.3)
SODIUM BLD-SCNC: 135 MMOL/L (ref 135–144)

## 2023-01-07 PROCEDURE — 6370000000 HC RX 637 (ALT 250 FOR IP): Performed by: NURSE PRACTITIONER

## 2023-01-07 PROCEDURE — 85025 COMPLETE CBC W/AUTO DIFF WBC: CPT

## 2023-01-07 PROCEDURE — 6360000002 HC RX W HCPCS: Performed by: NURSE PRACTITIONER

## 2023-01-07 PROCEDURE — 6370000000 HC RX 637 (ALT 250 FOR IP): Performed by: INTERNAL MEDICINE

## 2023-01-07 PROCEDURE — 2580000003 HC RX 258: Performed by: NURSE PRACTITIONER

## 2023-01-07 PROCEDURE — 71260 CT THORAX DX C+: CPT | Performed by: INTERNAL MEDICINE

## 2023-01-07 PROCEDURE — 94640 AIRWAY INHALATION TREATMENT: CPT

## 2023-01-07 PROCEDURE — 93971 EXTREMITY STUDY: CPT

## 2023-01-07 PROCEDURE — 94760 N-INVAS EAR/PLS OXIMETRY 1: CPT

## 2023-01-07 PROCEDURE — 99232 SBSQ HOSP IP/OBS MODERATE 35: CPT | Performed by: INTERNAL MEDICINE

## 2023-01-07 PROCEDURE — 83735 ASSAY OF MAGNESIUM: CPT

## 2023-01-07 PROCEDURE — 80048 BASIC METABOLIC PNL TOTAL CA: CPT

## 2023-01-07 PROCEDURE — 99221 1ST HOSP IP/OBS SF/LOW 40: CPT | Performed by: SURGERY

## 2023-01-07 PROCEDURE — 1200000000 HC SEMI PRIVATE

## 2023-01-07 PROCEDURE — 6360000004 HC RX CONTRAST MEDICATION: Performed by: INTERNAL MEDICINE

## 2023-01-07 PROCEDURE — 82947 ASSAY GLUCOSE BLOOD QUANT: CPT

## 2023-01-07 PROCEDURE — 36415 COLL VENOUS BLD VENIPUNCTURE: CPT

## 2023-01-07 RX ORDER — FUROSEMIDE 20 MG/1
20 TABLET ORAL 2 TIMES DAILY
Status: DISCONTINUED | OUTPATIENT
Start: 2023-01-07 | End: 2023-01-08

## 2023-01-07 RX ORDER — IPRATROPIUM BROMIDE AND ALBUTEROL SULFATE 2.5; .5 MG/3ML; MG/3ML
1 SOLUTION RESPIRATORY (INHALATION) EVERY 4 HOURS PRN
Status: DISCONTINUED | OUTPATIENT
Start: 2023-01-07 | End: 2023-01-11 | Stop reason: HOSPADM

## 2023-01-07 RX ADMIN — LACTULOSE 10 G: 20 SOLUTION ORAL at 08:22

## 2023-01-07 RX ADMIN — PANTOPRAZOLE SODIUM 40 MG: 40 TABLET, DELAYED RELEASE ORAL at 06:17

## 2023-01-07 RX ADMIN — AZITHROMYCIN MONOHYDRATE 500 MG: 500 INJECTION, POWDER, LYOPHILIZED, FOR SOLUTION INTRAVENOUS at 23:56

## 2023-01-07 RX ADMIN — LEVOFLOXACIN 500 MG: 500 TABLET, FILM COATED ORAL at 08:22

## 2023-01-07 RX ADMIN — IOPAMIDOL 75 ML: 755 INJECTION, SOLUTION INTRAVENOUS at 22:09

## 2023-01-07 RX ADMIN — FUROSEMIDE 20 MG: 20 TABLET ORAL at 15:57

## 2023-01-07 RX ADMIN — CEFTRIAXONE SODIUM 1000 MG: 10 INJECTION, POWDER, FOR SOLUTION INTRAVENOUS at 23:56

## 2023-01-07 RX ADMIN — POTASSIUM CHLORIDE 10 MEQ: 1500 TABLET, EXTENDED RELEASE ORAL at 20:35

## 2023-01-07 RX ADMIN — SODIUM CHLORIDE: 9 INJECTION, SOLUTION INTRAVENOUS at 23:54

## 2023-01-07 RX ADMIN — LACTULOSE 10 G: 20 SOLUTION ORAL at 14:06

## 2023-01-07 RX ADMIN — ONDANSETRON 4 MG: 4 TABLET, ORALLY DISINTEGRATING ORAL at 17:28

## 2023-01-07 RX ADMIN — SODIUM CHLORIDE, PRESERVATIVE FREE 10 ML: 5 INJECTION INTRAVENOUS at 08:22

## 2023-01-07 RX ADMIN — INSULIN LISPRO 4 UNITS: 100 INJECTION, SOLUTION INTRAVENOUS; SUBCUTANEOUS at 08:22

## 2023-01-07 RX ADMIN — IPRATROPIUM BROMIDE AND ALBUTEROL SULFATE 1 AMPULE: .5; 2.5 SOLUTION RESPIRATORY (INHALATION) at 13:01

## 2023-01-07 RX ADMIN — SODIUM CHLORIDE, PRESERVATIVE FREE 10 ML: 5 INJECTION INTRAVENOUS at 20:35

## 2023-01-07 RX ADMIN — POTASSIUM CHLORIDE 10 MEQ: 1500 TABLET, EXTENDED RELEASE ORAL at 08:22

## 2023-01-07 ASSESSMENT — ENCOUNTER SYMPTOMS
SHORTNESS OF BREATH: 1
RHINORRHEA: 0
ABDOMINAL DISTENTION: 1
ABDOMINAL PAIN: 0
COUGH: 0
COLOR CHANGE: 0
APNEA: 0
EYE DISCHARGE: 0
EYE PAIN: 0
ABDOMINAL PAIN: 1
FACIAL SWELLING: 0
EYE REDNESS: 0
SHORTNESS OF BREATH: 0

## 2023-01-07 ASSESSMENT — PAIN SCALES - GENERAL: PAINLEVEL_OUTOF10: 0

## 2023-01-07 NOTE — PLAN OF CARE
Problem: Discharge Planning  Goal: Discharge to home or other facility with appropriate resources  1/7/2023 0441 by Jayden Smith RN  Outcome: Progressing     Problem: Pain  Goal: Verbalizes/displays adequate comfort level or baseline comfort level  1/7/2023 0441 by Jayden Smith RN  Outcome: Progressing     Problem: Respiratory - Adult  Goal: Achieves optimal ventilation and oxygenation  1/7/2023 0441 by Jayden Smith RN  Outcome: Progressing     Problem: Cardiovascular - Adult  Goal: Maintains optimal cardiac output and hemodynamic stability  1/7/2023 0441 by Jayden Smith RN  Outcome: Progressing     Problem: Cardiovascular - Adult  Goal: Absence of cardiac dysrhythmias or at baseline  1/7/2023 0441 by Jayden Smith RN  Outcome: Progressing     Problem: Musculoskeletal - Adult  Goal: Return mobility to safest level of function  1/7/2023 0441 by Jayden Smith RN  Outcome: Progressing     Problem: Musculoskeletal - Adult  Goal: Maintain proper alignment of affected body part  1/7/2023 0441 by Jayden Smith RN  Outcome: Progressing     Problem: Musculoskeletal - Adult  Goal: Return ADL status to a safe level of function  1/7/2023 0441 by Jayden Smith RN  Outcome: Progressing     Problem: Gastrointestinal - Adult  Goal: Minimal or absence of nausea and vomiting  1/7/2023 0441 by Jayden Smith RN  Outcome: Progressing     Problem: Gastrointestinal - Adult  Goal: Maintains or returns to baseline bowel function  1/7/2023 0441 by Jayden Smith RN  Outcome: Progressing     Problem: Hematologic - Adult  Goal: Maintains hematologic stability  1/7/2023 0441 by Jayden Smith RN  Outcome: Progressing     Problem: Skin/Tissue Integrity  Goal: Absence of new skin breakdown  Description: 1. Monitor for areas of redness and/or skin breakdown  2. Assess vascular access sites hourly  3. Every 4-6 hours minimum:  Change oxygen saturation probe site  4.   Every 4-6 hours:  If on nasal continuous positive airway pressure, respiratory therapy assess nares and determine need for appliance change or resting period.   1/7/2023 0441 by Marco Antonio Ji RN  Outcome: Progressing     Problem: Safety - Adult  Goal: Free from fall injury  1/7/2023 0441 by Marco Antonio Ji RN  Outcome: Progressing     Problem: ABCDS Injury Assessment  Goal: Absence of physical injury  1/7/2023 0441 by Marco Antonio Ji RN  Outcome: Progressing     Problem: Nutrition Deficit:  Goal: Optimize nutritional status  1/7/2023 0441 by Marco Antonio Ji RN  Outcome: Progressing

## 2023-01-07 NOTE — PLAN OF CARE
Problem: Pain  Goal: Verbalizes/displays adequate comfort level or baseline comfort level  1/7/2023 1634 by Brett Smiley RN  Outcome: Progressing  1/7/2023 0441 by Naif Santos RN  Outcome: Progressing     Problem: Safety - Adult  Goal: Free from fall injury  1/7/2023 1634 by Brett Smiley RN  Outcome: Progressing  1/7/2023 0441 by Naif Santos RN  Outcome: Progressing

## 2023-01-07 NOTE — PROGRESS NOTES
[facility]  Office: [address], DO, Uyen Thomas, DO, Katja Jimenez, DO, Anthony Romero Blood, DO, John Roche MD, Kayla Waite MD, Gary Howard MD, Brian Abraham MD,  Conor Mansfield MD, Tootie Hooker MD, John Roberts, DO, Matheus Trujillo MD,  Tanja Pedroza MD, Norah Lerner MD, Eva Andrew, DO, Michelle Rodriguez MD, Jl Dominique MD, Ronald Ramires DO, Bernabe Rajan MD, Cristiane Farrell MD, Rajwinder Felix MD, Thaddeus Stringer MD, Margy Rojas DO, Jeannette Valero MD, Celio Brown MD, Laci Cueva, Tara Ford, CNP, Jayce Rosales, CNP, Neeru Leo, CNP,  Geovanna Casillas, [facility], Keri Mckeon, CNP, Sharee Lamb, CNP, Francesca Landis, CNP, Alayna Gifford, CNP, Ana Mir, CNP, Hailey Marrero PAJeanC, Wilbur Gilliland, [facility], Lloyd Haddad, [facility], Justen Caraballo, [address]    Progress Note    1/7/2023    3:06 PM    Name:   Baljeet Kumar  MRN:     [id number]     [place]:      [de-identified]   Room:   [address] [place] [address] Day:  10  Admit Date:  12/28/2022 11:30 PM    PCP:   VASU Mcclain  Code Status:  Full Code    Subjective:     C/C:  bleeding   Interval History Status: Not changed    Pts arm swelling not improved with wrapping worse on the right. His abdominal distention is better  He is having some shortness of breath. Brief History:     cirrhosis    Review of Systems:     Constitutional:  negative for chills, fevers, sweats feels good today  Respiratory:  negative for cough, dyspnea on exertion, shortness of breath, wheezing  Cardiovascular:  negative for chest pain, chest pressure/discomfort, lower extremity edema, palpitations  Gastrointestinal:  negative for abdominal pain, constipation, diarrhea, nausea, vomiting  Neurological:  negative for dizziness, headache  Ext: upper arm swelling worse on right than left. Medications:      Allergies:  No Known Allergies    Current Meds:   Scheduled Meds:    furosemide  20 mg Oral BID    lactulose  10 g Oral TID    pantoprazole  40 mg Oral QAM AC    levoFLOXacin  500 mg Oral Daily    insulin lispro  0.05 Units/kg SubCUTAneous TID WC    potassium chloride  10 mEq Oral BID    insulin lispro  0-4 Units SubCUTAneous TID WC    insulin lispro  0-4 Units SubCUTAneous Nightly    sodium chloride flush  5-40 mL IntraVENous 2 times per day    [Held by provider] baclofen  10 mg Oral TID    sodium chloride flush  5-40 mL IntraVENous 2 times per day     Continuous Infusions:    dextrose      dextrose      sodium chloride       PRN Meds: ipratropium-albuterol, potassium chloride **OR** potassium alternative oral replacement **OR** potassium chloride, magnesium sulfate, sodium phosphate IVPB **OR** sodium phosphate IVPB **OR** sodium phosphate IVPB, glucose, dextrose bolus **OR** dextrose bolus, glucagon (rDNA), dextrose, glucose, dextrose bolus **OR** dextrose bolus, glucagon (rDNA), dextrose, sodium chloride flush, sodium chloride, sodium chloride flush, ondansetron **OR** ondansetron    Data:     Past Medical History:   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 ([facility] Alcoholic cirrhosis of liver with ascites ([facility] Anemia, Anxiety, Arthritis, Back pain, chronic, Lezama esophagus, BPH (benign prostatic hypertrophy), Cholelithiasis, Cirrhosis ([facility] COVID-19, COVID-19 vaccine series completed, DDD (degenerative disc disease), lumbar, Depression, Esophageal cancer ([facility] Esophageal varices ([facility] Fatty liver, GERD (gastroesophageal reflux disease), GI bleed, Hep C w/o coma, chronic ([facility] History of alcohol abuse, History of blood transfusion, History of colon polyps, History of tobacco abuse, [place] (hard of hearing), Hyperlipidemia, Hypertension, Hyponatremia, Hypotension, Mastoid disorder, bilateral, PONV (postoperative nausea and vomiting), Port-A-Cath in place, Portal hypertension ([facility] Sciatica, Secondary esophageal varices ([facility] Shortness of breath, Spinal stenosis, Stomach ulcer, Thrombocytopenia ([facility] Tubular adenoma of colon, Vitamin D deficiency, and Wears glasses. Social History:   reports that he quit smoking about 5 years ago. His smoking use included cigarettes. He has a 45.00 pack-year smoking history. He has never used smokeless tobacco. He reports that he does not currently use alcohol. He reports that he does not currently use drugs after having used the following drugs: Cocaine. Frequency: 1.00 time per week. Family History:   Family History   Problem Relation Age of Onset    Cancer Mother         pancreatic    Cancer Father         bone    Diabetes Sister     Asthma Brother     Allergies Brother         MULTIPLE       Vitals:  /62   Pulse 81   Temp 97.7 °F (36.5 °C) (Oral)   Resp 24   Ht 5' 10\" (1.778 m)   Wt 226 lb 13.7 oz (102.9 kg)   SpO2 98%   BMI 32.55 kg/m²   Temp (24hrs), Av.7 °F (36.5 °C), Min:97.3 °F (36.3 °C), Max:98.1 °F (36.7 °C)    Recent Labs     23  1810 23  0823 23  1108   POCGLU 126* 119* 105 97         I/O (24Hr): Intake/Output Summary (Last 24 hours) at 2023 1506  Last data filed at 2023 5445  Gross per 24 hour   Intake 240 ml   Output 550 ml   Net -310 ml         Labs:  Hematology:  No results for input(s): WBC, RBC, HGB, HCT, MCV, MCH, MCHC, RDW, PLT, MPV, SEDRATE, CRP, INR, DDIMER, QU3DTZWF, LABABSO in the last 72 hours. Invalid input(s): PT    Chemistry:  No results for input(s): NA, K, CL, CO2, GLUCOSE, BUN, CREATININE, MG, ANIONGAP, LABGLOM, GFRAA, CALCIUM, CAION, PHOS, PSA, PROBNP, TROPHS, CKTOTAL, CKMB, CKMBINDEX, MYOGLOBIN, DIGOXIN, LACTACIDWB in the last 72 hours.     Recent Labs     23  0743 23  1217 23  1810 23  0823 23  1108   POCGLU 81 115* 126* 119* 105 97       ABG:  Lab Results   Component Value Date/Time    FIO2 INFORMATION NOT PROVIDED 2022 01:35 AM Lab Results   Component Value Date/Time    SPECIAL RT HAND 5ML 01/01/2023 02:36 PM     Lab Results   Component Value Date/Time    CULTURE NO GROWTH 4 DAYS 01/03/2023 11:20 AM       Radiology:  XR CHEST (SINGLE VIEW FRONTAL)    Result Date: 12/27/2022  1. Minimal bibasilar atelectatic changes with trace left pleural fluid which is slightly decreased from 12/09/2022. XR RADIUS ULNA RIGHT (2 VIEWS)    Result Date: 1/1/2023  No fracture or dislocation. Mild soft tissue swelling     CT HEAD WO CONTRAST    Result Date: 12/31/2022  CT BRAIN: 1. No evidence of acute hemorrhage, large territorial hypodensity, significant mass effect/midline shift, or ventriculomegaly 2. Involutional parenchymal changes. 3. Age-indeterminate (likely chronic) left basal ganglia lacunar infarct. If clinically indicated, can consider further evaluation with MRI if no contraindications. CT CERVICAL SPINE: 1. No acute abnormality of the cervical spine. 2. Multilevel cervical spondylosis, most notable at C5-C6 and C6-C7. No high-grade bony spinal canal stenosis. CT CERVICAL SPINE WO CONTRAST    Result Date: 12/31/2022  CT BRAIN: 1. No evidence of acute hemorrhage, large territorial hypodensity, significant mass effect/midline shift, or ventriculomegaly 2. Involutional parenchymal changes. 3. Age-indeterminate (likely chronic) left basal ganglia lacunar infarct. If clinically indicated, can consider further evaluation with MRI if no contraindications. CT CERVICAL SPINE: 1. No acute abnormality of the cervical spine. 2. Multilevel cervical spondylosis, most notable at C5-C6 and C6-C7. No high-grade bony spinal canal stenosis. CT ABDOMEN PELVIS W IV CONTRAST Additional Contrast? Radiologist Recommendation    Result Date: 1/3/2023  1. There is a short segment of narrowing of the rectum near the rectosigmoid junction compared to the recent exam and may represent spasm rather than underlying mass.   Fluid-filled loops of small bowel and colon suggesting diarrhea with enteritis or colitis. Evaluation of true wall thickening is limited by the diffuse edematous state. 2.  Cirrhotic liver with tips, ascites, and sequela of portal hypertension. 3.  Thickening of the gastric mucosal folds and into the distal esophagus. Correlate with prior history of malignancy. 4.  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 5.  Pleural effusions and pericardial effusion are redemonstrated. The opacified portions of the lower lungs appears to be from compressive atelectasis. CT ABDOMEN PELVIS W IV CONTRAST Additional Contrast? None    Result Date: 12/27/2022  Stable circumferential wall thickening of the distal esophagus and gastroesophageal junction consistent with known esophageal malignancy. No significant change since prior examination. No esophageal obstruction. Cirrhotic liver with associated portal venous hypertension, progressive ascites and stable splenomegaly. TIPS appears in place and patent Cholelithiasis Interval development of bibasal infiltrates and moderate bilateral pleural effusions as well as mild-to-moderate pericardial effusion     IR FLUORO GUIDED NEEDLE PLACEMENT    Result Date: 12/28/2022  Successful ultrasound-guided placement of a right upper extremity midline venous catheter. US LIVER    Result Date: 12/28/2022  1. Cholelithiasis without definite findings of acute cholecystitis. 2. Patent TIPS. Specific velocities recorded above. 3. Small volume abdominal ascites. 4. Hepatic morphologic features typical of cirrhosis. US GALLBLADDER RUQ    Result Date: 12/30/2022  Multiple cholelithiasis but no ultrasound evidence cholecystitis. Nondilated biliary tree. Cirrhosis. Patent TIPS. Ascites. Additional findings, as above.      XR CHEST PORTABLE    Result Date: 1/3/2023  Increasing vascular congestion with small pleural effusions, suggestive of developing edema. XR CHEST PORTABLE    Result Date: 1/2/2023  Mild pulmonary vascular congestion without evidence of overt edema     XR CHEST PORTABLE    Result Date: 12/31/2022  Little change from prior study. Cardiomegaly, vascular congestion, effusions, atelectasis and possible superimposed acute airspace disease are again noted. XR CHEST PORTABLE    Result Date: 12/30/2022  Cardiomegaly with probable mild central vascular congestion and similar bilateral pleural effusions with compressive atelectasis; filtrate at either base a consideration. No pulmonary edema. IR US GUIDED PARACENTESIS    Result Date: 12/28/2022  Successful ultrasound-guided therapeutic and diagnostic paracentesis. IR REVISION TIPS    Result Date: 1/2/2023  TIPS stent reassessment showing 6 mm Hg portosystemic shunt; no significant variceal filling demonstrated, but successful left gastric vein embolization performed. TIPS stent angioplasty to 10 mm with excellent flow demonstrated. The findings were discussed with Dr. Addis Srivastava post procedure. MRI BRAIN WO CONTRAST    Result Date: 1/2/2023  No evidence of acute ischemia. New moderate-sized bilateral mastoid effusions. Small old lacune within the left basal ganglia. New low T1 signal within the marrow elements diffusely likely representing a marrow replacement process such as anemia. Physical Examination:        General appearance:  alert, cooperative and no distress  Mental Status:  oriented to person, place and time and normal affect  Eyes: :+scleral icterus.    Lungs:  clear to auscultation bilaterally, normal effort  Heart:  regular rate and rhythm, no murmur  Abdomen:  soft, nontender, nondistended, normal bowel sounds, no masses, +hepatomegaly, +splenomegaly no fluid wave  Extremities:  no edema, redness, tenderness in the calves  Skin:  no gross lesions, rashes, induration    Assessment:        Hospital Problems             Last Modified POA    Ascites due to alcoholic cirrhosis ([facility] 26/20/1266 Yes    Shortness of breath 12/29/2022 Yes    Portal hypertension ([address] 12/29/2022 Yes    Esophageal varices with bleeding ([address] 12/29/2022 Yes    Other abnormalities of gait and mobility 12/29/2022 Yes    S/P TIPS (transjugular intrahepatic portosystemic shunt) 12/29/2022 Yes    Acute blood loss anemia 12/29/2022 Yes    Near syncope 12/29/2022 Yes    Bleeding gastric varices 12/29/2022 Yes    Hepatic encephalopathy 97/73/3653 Yes    Periumbilical abdominal pain 12/29/2022 Yes    Lezama's esophagus with dysplasia 12/30/2022 Yes    Encephalopathy acute 1/1/2023 Yes    CRP elevated 1/4/2023 Yes    Community acquired bilateral lower lobe pneumonia 1/4/2023 Yes    Acute hyperactive alcohol withdrawal delirium ([address] 12/30/2022 Yes    Decompensation of cirrhosis of liver ([address] 1/1/2023 Yes    DDD (degenerative disc disease), lumbar 12/29/2022 Yes    Acute upper GI hemorrhage 12/29/2022 Yes    Gastrointestinal hemorrhage with melena 12/29/2022 Yes    Mastoid disorder, bilateral 12/31/2022 Yes    Overview Signed 12/31/2022  4:50 PM by iTm Pizarro MD     biLateral mastoid effusion        Plan:        Acute upper GI bleed due to decompensated alcoholic cirrhosis and gastric AVM-resolved   EGD showed large varices with moderate portal hypertensive gastropathy. Add lasix 20 mg BID  Will need to see GI outpt to determine need/frequency of paracentesis. Hepatic encephalopathy: continue actulose titrate to 3 loose BM daily  Arm swelling likely due to low albumin will check duplex ultrasound of right arm to look for DVT. Debility: Needs PTOT   Pneumonia: Continue Levaquin until 1/7  PCM suggested: Dietary consulted, continue feeding. PTOT  Continue breathing treatments.      Dispo: need PTOT evaluation and possible placement d/c order placed 1/4  Gricelda Guidry,   1/7/2023  3:06 PM

## 2023-01-08 PROBLEM — R10.33 PERIUMBILICAL ABDOMINAL PAIN: Status: RESOLVED | Noted: 2022-12-29 | Resolved: 2023-01-08

## 2023-01-08 PROBLEM — M25.421 SWELLING OF JOINT OF UPPER ARM, RIGHT: Status: ACTIVE | Noted: 2023-01-08

## 2023-01-08 PROBLEM — R18.8 OTHER ASCITES: Status: ACTIVE | Noted: 2023-01-08

## 2023-01-08 PROBLEM — R55 NEAR SYNCOPE: Status: RESOLVED | Noted: 2022-12-29 | Resolved: 2023-01-08

## 2023-01-08 PROBLEM — K92.2 ACUTE UPPER GI HEMORRHAGE: Status: RESOLVED | Noted: 2020-12-23 | Resolved: 2023-01-08

## 2023-01-08 PROBLEM — R79.82 CRP ELEVATED: Status: RESOLVED | Noted: 2023-01-04 | Resolved: 2023-01-08

## 2023-01-08 PROBLEM — I86.4 BLEEDING GASTRIC VARICES: Status: RESOLVED | Noted: 2022-12-29 | Resolved: 2023-01-08

## 2023-01-08 PROBLEM — D62 ACUTE BLOOD LOSS ANEMIA: Status: RESOLVED | Noted: 2022-12-29 | Resolved: 2023-01-08

## 2023-01-08 PROBLEM — I85.01 ESOPHAGEAL VARICES WITH BLEEDING (HCC): Status: RESOLVED | Noted: 2022-06-07 | Resolved: 2023-01-08

## 2023-01-08 PROBLEM — G93.40 ENCEPHALOPATHY ACUTE: Status: RESOLVED | Noted: 2023-01-01 | Resolved: 2023-01-08

## 2023-01-08 PROBLEM — B18.2 CHRONIC HEPATITIS C WITHOUT HEPATIC COMA (HCC): Status: ACTIVE | Noted: 2023-01-08

## 2023-01-08 PROBLEM — J18.9 PNEUMONIA OF BOTH LOWER LOBES DUE TO INFECTIOUS ORGANISM: Status: ACTIVE | Noted: 2023-01-08

## 2023-01-08 PROBLEM — J18.9 COMMUNITY ACQUIRED BILATERAL LOWER LOBE PNEUMONIA: Status: RESOLVED | Noted: 2023-01-04 | Resolved: 2023-01-08

## 2023-01-08 PROBLEM — K76.82 HEPATIC ENCEPHALOPATHY (HCC): Status: RESOLVED | Noted: 2022-12-29 | Resolved: 2023-01-08

## 2023-01-08 PROBLEM — J18.9 PNEUMONIA OF BOTH LOWER LOBES DUE TO INFECTIOUS ORGANISM: Status: RESOLVED | Noted: 2023-01-08 | Resolved: 2023-01-08

## 2023-01-08 LAB
ABSOLUTE EOS #: 0.18 K/UL (ref 0–0.4)
ABSOLUTE IMMATURE GRANULOCYTE: 0 K/UL (ref 0–0.3)
ABSOLUTE LYMPH #: 0.65 K/UL (ref 1–4.8)
ABSOLUTE MONO #: 0.53 K/UL (ref 0.1–0.8)
BASOPHILS # BLD: 1 % (ref 0–2)
BASOPHILS ABSOLUTE: 0.06 K/UL (ref 0–0.2)
EOSINOPHILS RELATIVE PERCENT: 3 % (ref 1–4)
GLUCOSE BLD-MCNC: 104 MG/DL (ref 75–110)
GLUCOSE BLD-MCNC: 112 MG/DL (ref 75–110)
GLUCOSE BLD-MCNC: 113 MG/DL (ref 75–110)
GLUCOSE BLD-MCNC: 83 MG/DL (ref 75–110)
HCT VFR BLD CALC: 27.7 % (ref 40.7–50.3)
HEMOGLOBIN: 8 G/DL (ref 13–17)
IMMATURE GRANULOCYTES: 0 %
LYMPHOCYTES # BLD: 11 % (ref 24–44)
MCH RBC QN AUTO: 25.2 PG (ref 25.2–33.5)
MCHC RBC AUTO-ENTMCNC: 28.9 G/DL (ref 28.4–34.8)
MCV RBC AUTO: 87.4 FL (ref 82.6–102.9)
MONOCYTES # BLD: 9 % (ref 1–7)
MORPHOLOGY: ABNORMAL
NRBC AUTOMATED: 0 PER 100 WBC
PDW BLD-RTO: 20.7 % (ref 11.8–14.4)
PLATELET # BLD: 153 K/UL (ref 138–453)
PMV BLD AUTO: 11.1 FL (ref 8.1–13.5)
RBC # BLD: 3.17 M/UL (ref 4.21–5.77)
SEG NEUTROPHILS: 76 % (ref 36–66)
SEGMENTED NEUTROPHILS ABSOLUTE COUNT: 4.48 K/UL (ref 1.8–7.7)
WBC # BLD: 5.9 K/UL (ref 3.5–11.3)

## 2023-01-08 PROCEDURE — 2580000003 HC RX 258: Performed by: NURSE PRACTITIONER

## 2023-01-08 PROCEDURE — 94761 N-INVAS EAR/PLS OXIMETRY MLT: CPT

## 2023-01-08 PROCEDURE — 94640 AIRWAY INHALATION TREATMENT: CPT

## 2023-01-08 PROCEDURE — 1200000000 HC SEMI PRIVATE

## 2023-01-08 PROCEDURE — 97535 SELF CARE MNGMENT TRAINING: CPT

## 2023-01-08 PROCEDURE — 97110 THERAPEUTIC EXERCISES: CPT

## 2023-01-08 PROCEDURE — 97530 THERAPEUTIC ACTIVITIES: CPT

## 2023-01-08 PROCEDURE — 6360000002 HC RX W HCPCS: Performed by: INTERNAL MEDICINE

## 2023-01-08 PROCEDURE — 99232 SBSQ HOSP IP/OBS MODERATE 35: CPT | Performed by: INTERNAL MEDICINE

## 2023-01-08 PROCEDURE — 6370000000 HC RX 637 (ALT 250 FOR IP): Performed by: NURSE PRACTITIONER

## 2023-01-08 PROCEDURE — 97116 GAIT TRAINING THERAPY: CPT

## 2023-01-08 PROCEDURE — 6370000000 HC RX 637 (ALT 250 FOR IP): Performed by: INTERNAL MEDICINE

## 2023-01-08 PROCEDURE — 82947 ASSAY GLUCOSE BLOOD QUANT: CPT

## 2023-01-08 RX ORDER — FUROSEMIDE 40 MG/1
40 TABLET ORAL DAILY
Status: DISCONTINUED | OUTPATIENT
Start: 2023-01-08 | End: 2023-01-09

## 2023-01-08 RX ORDER — LACTULOSE 10 G/15ML
10 SOLUTION ORAL 2 TIMES DAILY
Status: DISCONTINUED | OUTPATIENT
Start: 2023-01-08 | End: 2023-01-11 | Stop reason: HOSPADM

## 2023-01-08 RX ORDER — ENOXAPARIN SODIUM 100 MG/ML
40 INJECTION SUBCUTANEOUS DAILY
Status: DISCONTINUED | OUTPATIENT
Start: 2023-01-08 | End: 2023-01-11 | Stop reason: HOSPADM

## 2023-01-08 RX ORDER — SPIRONOLACTONE 25 MG/1
25 TABLET ORAL DAILY
Status: DISCONTINUED | OUTPATIENT
Start: 2023-01-08 | End: 2023-01-11 | Stop reason: HOSPADM

## 2023-01-08 RX ADMIN — FUROSEMIDE 40 MG: 40 TABLET ORAL at 14:36

## 2023-01-08 RX ADMIN — PANTOPRAZOLE SODIUM 40 MG: 40 TABLET, DELAYED RELEASE ORAL at 06:09

## 2023-01-08 RX ADMIN — POTASSIUM CHLORIDE 10 MEQ: 1500 TABLET, EXTENDED RELEASE ORAL at 09:01

## 2023-01-08 RX ADMIN — IPRATROPIUM BROMIDE AND ALBUTEROL SULFATE 1 AMPULE: .5; 2.5 SOLUTION RESPIRATORY (INHALATION) at 10:08

## 2023-01-08 RX ADMIN — POTASSIUM CHLORIDE 10 MEQ: 1500 TABLET, EXTENDED RELEASE ORAL at 22:33

## 2023-01-08 RX ADMIN — SODIUM CHLORIDE, PRESERVATIVE FREE 10 ML: 5 INJECTION INTRAVENOUS at 22:38

## 2023-01-08 RX ADMIN — ENOXAPARIN SODIUM 40 MG: 100 INJECTION SUBCUTANEOUS at 12:17

## 2023-01-08 RX ADMIN — LACTULOSE 10 G: 20 SOLUTION ORAL at 22:33

## 2023-01-08 RX ADMIN — LACTULOSE 10 G: 20 SOLUTION ORAL at 09:03

## 2023-01-08 RX ADMIN — SPIRONOLACTONE 25 MG: 25 TABLET ORAL at 12:17

## 2023-01-08 RX ADMIN — SODIUM CHLORIDE, PRESERVATIVE FREE 10 ML: 5 INJECTION INTRAVENOUS at 09:01

## 2023-01-08 ASSESSMENT — ENCOUNTER SYMPTOMS
ABDOMINAL PAIN: 0
EYE REDNESS: 0
COUGH: 0
EYE DISCHARGE: 0
RHINORRHEA: 0
SHORTNESS OF BREATH: 1
COLOR CHANGE: 0
APNEA: 0
ABDOMINAL DISTENTION: 1

## 2023-01-08 NOTE — PLAN OF CARE
Problem: Musculoskeletal - Adult  Goal: Return mobility to safest level of function  Outcome: Progressing  Goal: Maintain proper alignment of affected body part  Outcome: Progressing  Goal: Return ADL status to a safe level of function  Outcome: Progressing     Problem: Gastrointestinal - Adult  Goal: Minimal or absence of nausea and vomiting  Outcome: Progressing  Goal: Maintains or returns to baseline bowel function  Outcome: Progressing     Problem: Skin/Tissue Integrity  Goal: Absence of new skin breakdown  Description: 1. Monitor for areas of redness and/or skin breakdown  2. Assess vascular access sites hourly  3. Every 4-6 hours minimum:  Change oxygen saturation probe site  4. Every 4-6 hours:  If on nasal continuous positive airway pressure, respiratory therapy assess nares and determine need for appliance change or resting period.   Outcome: Progressing     Problem: Safety - Adult  Goal: Free from fall injury  1/8/2023 0530 by Jojo Macdonald RN  Outcome: Progressing  1/7/2023 1634 by Lesia Post RN  Outcome: Progressing     Problem: ABCDS Injury Assessment  Goal: Absence of physical injury  Outcome: Progressing     Problem: Nutrition Deficit:  Goal: Optimize nutritional status  Outcome: Progressing     Problem: Discharge Planning  Goal: Discharge to home or other facility with appropriate resources  Outcome: Progressing

## 2023-01-08 NOTE — PROGRESS NOTES
Occupational Therapy  Facility/Department: [facility] RENAL//MED SURG  Occupational Therapy Daily Treatment Note      Name: Sana Irving  : 1960  MRN: [id number]  Date of Service: 2023    Discharge Recommendations:  Patient would benefit from continued therapy after discharge  OT Equipment Recommendations  Walker: Rolling  ADL Assistive Devices: Transfer Tub Bench;Reacher;Long-handled Shoe Horn;Long-handled Sponge;Sock-Aid Hard       Patient Diagnosis(es): The encounter diagnosis was Decompensation of cirrhosis of liver ([facility] [address] 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 ([address] Alcoholic cirrhosis of liver with ascites ([address] Anemia, Anxiety, Arthritis, Back pain, chronic, Lezama esophagus, Bleeding gastric varices, BPH (benign prostatic hypertrophy), Cholelithiasis, Cirrhosis ([address] COVID-19, COVID-19 vaccine series completed, DDD (degenerative disc disease), lumbar, Depression, Esophageal cancer ([address] Esophageal varices ([address] Fatty liver, GERD (gastroesophageal reflux disease), GI bleed, Hep C w/o coma, chronic ([address] History of alcohol abuse, History of blood transfusion, History of colon polyps, History of tobacco abuse, [place] (hard of hearing), Hyperlipidemia, Hypertension, Hyponatremia, Hypotension, Mastoid disorder, bilateral, PONV (postoperative nausea and vomiting), Port-A-Cath in place, Portal hypertension ([address] Sciatica, Secondary esophageal varices ([address] Shortness of breath, Spinal stenosis, Stomach ulcer, Thrombocytopenia ([address] Tubular adenoma of colon, Vitamin D deficiency, and Wears glasses. Past Surgical History:  has a past surgical history that includes Bunionectomy; Nasal septum surgery; other surgical history (2016); Colonoscopy; Colonoscopy (10/05/2016); other surgical history (2016); other surgical history (2016); knee surgery (Left); Bunionectomy (Left);  Endoscopy, colon, diagnostic; pr neuroplasty &/transpos median nrv carpal tunne (Right, 08/29/2017); Carpal tunnel release (Right); pr neuroplasty &/transpos median nrv carpal tunne (Left, 10/31/2017); Colonoscopy (N/A, 03/30/2018); Colonoscopy (03/30/2018); pr njx aa&/strd tfrml epi lumbar/sacral 1 level (Bilateral, 09/06/2018); other surgical history (09/28/2018); pr njx aa&/strd tfrml epi lumbar/sacral 1 level (N/A, 09/28/2018); Pain management procedure (Left, 07/09/2020); Pain management procedure (Left, 07/20/2020); Pain management procedure (Bilateral, 08/17/2020); other surgical history (Right, 11/23/2020); Nerve Block (Right, 11/23/2020); Pain management procedure (Bilateral, 12/07/2020); Upper gastrointestinal endoscopy (N/A, 12/29/2020); Upper gastrointestinal endoscopy (N/A, 02/02/2021); Upper gastrointestinal endoscopy (N/A, 02/12/2021); Upper gastrointestinal endoscopy (02/12/2021); [place] tooth extraction; IR PORT PLACEMENT > 5 YEARS (04/19/2021); Upper gastrointestinal endoscopy (N/A, 08/31/2021); Upper gastrointestinal endoscopy (N/A, 01/21/2022); Upper gastrointestinal endoscopy (N/A, 04/15/2022); Colonoscopy (N/A, 04/16/2022); Upper gastrointestinal endoscopy (N/A, 06/06/2022); Upper gastrointestinal endoscopy (N/A, 06/09/2022); Paracentesis; Upper gastrointestinal endoscopy (N/A, 09/14/2022); Upper gastrointestinal endoscopy (N/A, 10/19/2022); Upper gastrointestinal endoscopy (N/A, 10/31/2022); IR TIPS INSERTION (12/01/2022); Esophagogastroduodenoscopy (12/29/2022); Upper gastrointestinal endoscopy (12/29/2022); Esophagogastroduodenoscopy (N/A, 01/03/2023); and Upper gastrointestinal endoscopy (N/A, 1/3/2023). Treatment Diagnosis: GI Blood      Assessment   Performance deficits / Impairments: Decreased functional mobility ; Decreased ADL status; Decreased strength;Decreased safe awareness;Decreased cognition;Decreased endurance;Decreased balance;Decreased high-level IADLs;Decreased coordination  Assessment: Pt will require continued OT services to increase functional independence with ADLs/IADLs and functional transfers/mobility. Treatment Diagnosis: GI Blood  Prognosis: Good  Activity Tolerance  Activity Tolerance: Patient Tolerated treatment well        Plan   Occupational Therapy Plan  Times Per Week: 3-5 x/wk  Current Treatment Recommendations: Strengthening, Balance training, Functional mobility training, Endurance training, Safety education & training, Patient/Caregiver education & training, Equipment evaluation, education, & procurement, Self-Care / ADL, Home management training, Positioning     Restrictions  Restrictions/Precautions  Restrictions/Precautions: Fall Risk, Up as Tolerated  Required Braces or Orthoses?: No  Position Activity Restriction  Other position/activity restrictions: s/p TIPS revision 12/30/22    Subjective   General  Patient assessed for rehabilitation services?: Yes  Response to previous treatment: Patient with no complaints from previous session  Family / Caregiver Present: No  Subjective  Subjective: Pt did not report any pain this date. General Comment  Comments: RN ok'd pt for OT session this date. Pt agreeable to session and pleasant/cooperative throughout. Pt denies pain. Pt supine in bed w/ HOB elevated, call light within reach, all needs met and RN notified at end of session. Objective   O2 Device: None (Room air)  Observation/Palpation  Posture: Good  Safety Devices  Type of Devices: Nurse notified;Call light within reach; Patient at risk for falls;Gait belt; Chair alarm in place; Left in chair  Restraints  Restraints Initially in Place: No  Bed Mobility Training  Interventions: Safety awareness training; Tactile cues; Verbal cues  Supine to Sit: Modified independent;Setup; Adaptive equipment; Additional time;Assist X1  Sit to Supine: JASON-pt remained up in chair. Scooting: Stand-by assistance; Additional time  Balance  Sitting: Without support (seated at EOB unsupported ~30 seconds and in recliner ~8 min supported by back of chair SBA)  Standing: With support (stood w/RW for transfers and func mob CGA ~ 2 min)  Transfer Training  Transfer Training: Yes  Overall Level of Assistance: Contact-guard assistance; Adaptive equipment; Additional time;Assist X1  Interventions: Safety awareness training;Verbal cues  Sit to Stand: Contact-guard assistance; Adaptive equipment;Assist X1  Stand to Sit: Contact-guard assistance; Adaptive equipment;Assist X1  Bed to Chair: Contact-guard assistance; Adaptive equipment;Assist X1  Functional mobility: CGA w/RW, w/no LOB  Overall Level of Assistance: Contact-guard assistance; Adaptive equipment;Assist X1  Assistive Device: Walker, rolling        ADL  Feeding: Independent; Increased time to complete (able to open containers and feed self)  Grooming: Setup; Increased time to complete;Stand by assistance;Verbal cueing (demo ability to reach top of head and face for grooming task)  UE Bathing: Setup;Verbal cueing; Increased time to complete;Stand by assistance (demo ability to reach UB and underarms to complete UB bathing, assist needed to wash back)  LE Bathing: Setup;Verbal cueing; Increased time to complete;Minimal assistance (demo ability to reach BLE to ankles, assist needed to wash feet)  UE Dressing: Setup;Verbal cueing; Increased time to complete;Stand by assistance (demo assist needed to snap, thread and tie gown)  LE Dressing: Setup;Verbal cueing; Increased time to complete;Minimal assistance (demo ability to reach to tops of feet, declined to doff/don footies)  Toileting: Unable to assess (wearing brief, has urinal at chair side)     Pt in bed upon arrival w/HOB elevated eating lunch. Pt transferred to EOB, STS w/RW to complete func mob to recliner w/CGA. Pt needed vc's for hand placement for safety. Impulsive and completed STS abruptly. Pt sat in recliner and demo ability to reach U/LB for self care using therapeutic reaching (see above for LOF).  Pt declined to complete actual self care at this time d/t wanting to eat lunch. Pt states has several friends willing to assist once discharged home. Pt on RA, no SOB with increased activity. Educ on edema management to keep RUE elevated onto pillows and apply ice packs as needed for no longer than 20 min on for edema management and pt verbalized understanding. Per RN, pt using urinal but does still have incontinence of bowels at times and needs assist w/aristides care and changing of brief. Will discuss w/OTR to update goals 1,2,3,6. Goal 4 met. Continue w/goal 5. Activity Tolerance  Activity Tolerance: Patient tolerated treatment well;Patient limited by endurance  Cognition  Overall Cognitive Status: WFL  Orientation  Overall Orientation Status: Within Functional Limits  Orientation Level: Oriented X4  Education Given To: Patient  Education Provided: Role of Therapy; ADL Adaptive Strategies;Transfer Training;IADL Safety;Equipment  Education Method: Verbal  Barriers to Learning: Vision;None  Education Outcome: Verbalized understanding;Demonstrated understanding;Continued education needed    AM-PAC Score  AM-PAC Inpatient Daily Activity Raw Score: 19 (01/08/23 1233)  AM-PAC Inpatient ADL T-Scale Score : 40.22 (01/08/23 1233)  ADL Inpatient CMS 0-100% Score: 42.8 (01/08/23 1233)  ADL Inpatient CMS G-Code Modifier : CK (01/08/23 1233)     Goals  Short Term Goals  Time Frame for Short Term Goals: By discharge, pt will:  Short Term Goal 1: Demo SUP for bed mobility to increase independence with ADLs and decrease risk for pressure injury  Short Term Goal 2: Demo Mod I for feeding, grooming, and UB ADLs with AE use PRN  Short Term Goal 3: Demo CGA for LB ADLs and toileting tasks with AE use PRN  Short Term Goal 4: Follow 50% of 2-step commands with appropriate initiation and sequencing for improved functional independence  Short Term Goal 5:  Incorporate/verbalize 2 edema management techniques during therapy session with 100% accuracy  Short Term Goal 6: Engage in functional transfers and functional mobility with CGA and use of LRD PRN for engagement in ADLs/IADLs     Therapy Time   Individual Concurrent Group Co-treatment   Time In 1205         Time Out 1217         Minutes 12         Timed Code Treatment Minutes: [address] NATANAEL Doan/L

## 2023-01-08 NOTE — PROGRESS NOTES
[facility]  Office: [address], DO, Uyen William, DO, Katja Tony, DO, Anthony Vanos Blood, DO, John Roche MD, Kayla Waite MD, Gary Howard MD, Brian Abraham MD,  Conor Mansfield MD, Tootie Hooker MD, John Roberts, DO, Matheus Trujillo MD,  Tanja Pedroza MD, Norah Lerner MD, Eva Andrew, DO, Michelle Rodriguez MD, Alexia Guillermo MD, Ronald Ramires, DO, Bernabe Rajan MD, Cristiane Farrell MD, Rajwinder Felix MD, Thaddeus Stringer MD, Margy Rojas, DO, Jeannette Valero MD, Celio Brown MD, Laci Cueva, CNP,  Yessenia Carlin, CNP, Jayce Rosales, CNP, Neeru Leo, CNP,  Geovanna Casillas, [facility], Keri Mckeon, CNP, Sharee Lamb, CNP, Francesca Landis, CNP, Alayna Gifford, CNP, Ana Mir, CNP, Hailey Marrero PA-C, Wilbur Gilliland, [facility], Lloyd Haddad, [facility], Justen Graft, [facility]    Progress Note    1/8/2023    9:05 AM    Name:   Baljeet Kumar  MRN:     [id number]     Acct:      [de-identified]   Room:   [address] [place] [address] Day:  11  Admit Date:  12/28/2022 11:30 PM    PCP:   VASU Mcclain  Code Status:  Full Code    Subjective:     C/C:  bleeding   Interval History Status: Not changed    Pts arm swelling better today with wrapping  He feels like his breathing is better especially with breathing treatments. He is urinating with lasix  No bleeding  He is having a lot of bowel movements today.      Brief History:     cirrhosis    Review of Systems:     Constitutional:  negative for chills, fevers, sweats feels good today  Respiratory:  negative for cough, dyspnea on exertion, shortness of breath, wheezing  Cardiovascular:  negative for chest pain, chest pressure/discomfort, lower extremity edema, palpitations  Gastrointestinal:  negative for abdominal pain, constipation, diarrhea, nausea, vomiting  Neurological:  negative for dizziness, headache  Ext: upper arm swelling worse on right than left. Medications:      Allergies:  No Known Allergies    Current Meds:   Scheduled Meds:    spironolactone  25 mg Oral Daily    furosemide  40 mg Oral Daily    lactulose  10 g Oral BID    enoxaparin  40 mg SubCUTAneous Daily    pantoprazole  40 mg Oral QAM AC    insulin lispro  0.05 Units/kg SubCUTAneous TID WC    potassium chloride  10 mEq Oral BID    insulin lispro  0-4 Units SubCUTAneous TID WC    insulin lispro  0-4 Units SubCUTAneous Nightly    sodium chloride flush  5-40 mL IntraVENous 2 times per day    [Held by provider] baclofen  10 mg Oral TID    sodium chloride flush  5-40 mL IntraVENous 2 times per day     Continuous Infusions:    dextrose      dextrose      sodium chloride 20 mL/hr at 01/07/23 1769     PRN Meds: ipratropium-albuterol, potassium chloride **OR** potassium alternative oral replacement **OR** potassium chloride, magnesium sulfate, sodium phosphate IVPB **OR** sodium phosphate IVPB **OR** sodium phosphate IVPB, glucose, dextrose bolus **OR** dextrose bolus, glucagon (rDNA), dextrose, glucose, dextrose bolus **OR** dextrose bolus, glucagon (rDNA), dextrose, sodium chloride flush, sodium chloride, sodium chloride flush, ondansetron **OR** ondansetron    Data:     Past Medical History:   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 ([facility] Alcoholic cirrhosis of liver with ascites ([facility] Anemia, Anxiety, Arthritis, Back pain, chronic, Lezama esophagus, Bleeding gastric varices, BPH (benign prostatic hypertrophy), Cholelithiasis, Cirrhosis ([facility] COVID-19, COVID-19 vaccine series completed, DDD (degenerative disc disease), lumbar, Depression, Esophageal cancer ([facility] Esophageal varices ([facility] Fatty liver, GERD (gastroesophageal reflux disease), GI bleed, Hep C w/o coma, chronic ([facility] History of alcohol abuse, History of blood transfusion, History of colon polyps, History of tobacco abuse, [place] (hard of hearing), Hyperlipidemia, Hypertension, Hyponatremia, Hypotension, Mastoid disorder, bilateral, PONV (postoperative nausea and vomiting), Port-A-Cath in place, Portal hypertension ([address] Sciatica, Secondary esophageal varices ([address] Shortness of breath, Spinal stenosis, Stomach ulcer, Thrombocytopenia ([address] Tubular adenoma of colon, Vitamin D deficiency, and Wears glasses. Social History:   reports that he quit smoking about 5 years ago. His smoking use included cigarettes. He has a 45.00 pack-year smoking history. He has never used smokeless tobacco. He reports that he does not currently use alcohol. He reports that he does not currently use drugs after having used the following drugs: Cocaine. Frequency: 1.00 time per week. Family History:   Family History   Problem Relation Age of Onset    Cancer Mother         pancreatic    Cancer Father         bone    Diabetes Sister     Asthma Brother     Allergies Brother         MULTIPLE       Vitals:  BP (!) 100/57   Pulse 65   Temp 98 °F (36.7 °C)   Resp 18   Ht 5' 10\" (1.778 m)   Wt 216 lb (98 kg)   SpO2 95%   BMI 30.99 kg/m²   Temp (24hrs), Av °F (36.7 °C), Min:98 °F (36.7 °C), Max:98.1 °F (36.7 °C)    Recent Labs     23  1108 23  1734 23  0631   POCGLU 97 109 114* 83       I/O (24Hr):     Intake/Output Summary (Last 24 hours) at 2023 0905  Last data filed at 2023 0606  Gross per 24 hour   Intake --   Output 750 ml   Net -750 ml       Labs:  Hematology:  Recent Labs     23  2336   WBC 5.9   RBC 3.17*   HGB 8.0*   HCT 27.7*   MCV 87.4   MCH 25.2   MCHC 28.9   RDW 20.7*      MPV 11.1     Chemistry:  Recent Labs     23  1919      K 3.5*   *   CO2 20   GLUCOSE 119*   BUN 6*   CREATININE 0.45*   MG 1.3*   ANIONGAP 7*   LABGLOM >60   CALCIUM 8.0*     Recent Labs     23  0823 23  1108 23  1734 23  1951 23  0631   POCGLU 119* 105 97 109 114* 83     ABG:  Lab Results   Component Value Date/Time    FIO2 INFORMATION NOT PROVIDED 12/30/2022 01:35 AM     Lab Results   Component Value Date/Time    SPECIAL RT HAND 5ML 01/01/2023 02:36 PM     Lab Results   Component Value Date/Time    CULTURE NO GROWTH 5 DAYS 01/03/2023 11:20 AM       Radiology:  XR CHEST (SINGLE VIEW FRONTAL)    Result Date: 12/27/2022  1. Minimal bibasilar atelectatic changes with trace left pleural fluid which is slightly decreased from 12/09/2022. XR RADIUS ULNA RIGHT (2 VIEWS)    Result Date: 1/1/2023  No fracture or dislocation. Mild soft tissue swelling     CT HEAD WO CONTRAST    Result Date: 12/31/2022  CT BRAIN: 1. No evidence of acute hemorrhage, large territorial hypodensity, significant mass effect/midline shift, or ventriculomegaly 2. Involutional parenchymal changes. 3. Age-indeterminate (likely chronic) left basal ganglia lacunar infarct. If clinically indicated, can consider further evaluation with MRI if no contraindications. CT CERVICAL SPINE: 1. No acute abnormality of the cervical spine. 2. Multilevel cervical spondylosis, most notable at C5-C6 and C6-C7. No high-grade bony spinal canal stenosis. CT CERVICAL SPINE WO CONTRAST    Result Date: 12/31/2022  CT BRAIN: 1. No evidence of acute hemorrhage, large territorial hypodensity, significant mass effect/midline shift, or ventriculomegaly 2. Involutional parenchymal changes. 3. Age-indeterminate (likely chronic) left basal ganglia lacunar infarct. If clinically indicated, can consider further evaluation with MRI if no contraindications. CT CERVICAL SPINE: 1. No acute abnormality of the cervical spine. 2. Multilevel cervical spondylosis, most notable at C5-C6 and C6-C7. No high-grade bony spinal canal stenosis. CT ABDOMEN PELVIS W IV CONTRAST Additional Contrast? Radiologist Recommendation    Result Date: 1/3/2023  1. There is a short segment of narrowing of the rectum near the rectosigmoid junction compared to the recent exam and may represent spasm rather than underlying mass. Fluid-filled loops of small bowel and colon suggesting diarrhea with enteritis or colitis. Evaluation of true wall thickening is limited by the diffuse edematous state. 2.  Cirrhotic liver with tips, ascites, and sequela of portal hypertension. 3.  Thickening of the gastric mucosal folds and into the distal esophagus. Correlate with prior history of malignancy. 4.  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 5.  Pleural effusions and pericardial effusion are redemonstrated. The opacified portions of the lower lungs appears to be from compressive atelectasis. CT ABDOMEN PELVIS W IV CONTRAST Additional Contrast? None    Result Date: 12/27/2022  Stable circumferential wall thickening of the distal esophagus and gastroesophageal junction consistent with known esophageal malignancy. No significant change since prior examination. No esophageal obstruction. Cirrhotic liver with associated portal venous hypertension, progressive ascites and stable splenomegaly. TIPS appears in place and patent Cholelithiasis Interval development of bibasal infiltrates and moderate bilateral pleural effusions as well as mild-to-moderate pericardial effusion     IR FLUORO GUIDED NEEDLE PLACEMENT    Result Date: 12/28/2022  Successful ultrasound-guided placement of a right upper extremity midline venous catheter. US LIVER    Result Date: 12/28/2022  1. Cholelithiasis without definite findings of acute cholecystitis. 2. Patent TIPS. Specific velocities recorded above. 3. Small volume abdominal ascites. 4. Hepatic morphologic features typical of cirrhosis. US GALLBLADDER RUQ    Result Date: 12/30/2022  Multiple cholelithiasis but no ultrasound evidence cholecystitis. Nondilated biliary tree. Cirrhosis. Patent TIPS. Ascites. Additional findings, as above.      XR CHEST PORTABLE    Result Date: 1/3/2023  Increasing vascular congestion with small pleural effusions, suggestive of developing edema. XR CHEST PORTABLE    Result Date: 1/2/2023  Mild pulmonary vascular congestion without evidence of overt edema     XR CHEST PORTABLE    Result Date: 12/31/2022  Little change from prior study. Cardiomegaly, vascular congestion, effusions, atelectasis and possible superimposed acute airspace disease are again noted. XR CHEST PORTABLE    Result Date: 12/30/2022  Cardiomegaly with probable mild central vascular congestion and similar bilateral pleural effusions with compressive atelectasis; filtrate at either base a consideration. No pulmonary edema. IR US GUIDED PARACENTESIS    Result Date: 12/28/2022  Successful ultrasound-guided therapeutic and diagnostic paracentesis. IR REVISION TIPS    Result Date: 1/2/2023  TIPS stent reassessment showing 6 mm Hg portosystemic shunt; no significant variceal filling demonstrated, but successful left gastric vein embolization performed. TIPS stent angioplasty to 10 mm with excellent flow demonstrated. The findings were discussed with Dr. Kamron León post procedure. MRI BRAIN WO CONTRAST    Result Date: 1/2/2023  No evidence of acute ischemia. New moderate-sized bilateral mastoid effusions. Small old lacune within the left basal ganglia. New low T1 signal within the marrow elements diffusely likely representing a marrow replacement process such as anemia. Physical Examination:        General appearance:  alert, cooperative and no distress  Mental Status:  oriented to person, place and time and normal affect  Eyes: :+scleral icterus.    Lungs:  clear to auscultation bilaterally, normal effort  Heart:  regular rate and rhythm, no murmur  Abdomen:  soft, nontender, nondistended, normal bowel sounds, no masses, +hepatomegaly, +splenomegaly no fluid wave  Extremities:  no edema, redness, tenderness in the calves  Skin:  no gross lesions, rashes, induration    Assessment:        Hospital Problems Last Modified POA    Decompensation of cirrhosis of liver ([facility] [address] 1/8/2023 Yes    Ascites due to alcoholic cirrhosis ([address] 8/5/5780 Yes    Shortness of breath 1/8/2023 Yes    Acute deep vein thrombosis (DVT) of brachial vein of right upper extremity ([address] 1/8/2023 Yes    Other abnormalities of gait and mobility 12/29/2022 Yes    S/P TIPS (transjugular intrahepatic portosystemic shunt) 12/29/2022 Yes    Lezama's esophagus with dysplasia 12/30/2022 Yes    Chronic hepatitis C without hepatic coma ([facility] [address] 1/8/2023 Yes    DDD (degenerative disc disease), lumbar 12/29/2022 Yes    Acute hypokalemia 1/8/2023 Yes    Mastoid disorder, bilateral 12/31/2022 Yes    Overview Signed 12/31/2022  4:50 PM by Candice Zee MD     biLateral mastoid effusion          Plan:        Acute upper GI bleed due to decompensated alcoholic cirrhosis and gastric AVM-resolved   EGD showed large varices with moderate portal hypertensive gastropathy. Continue lasix 40 mg daily, add aldactone   Will need to see GI outpt to determine need/frequency of paracentesis. Multifocal pneumonia-resolved :  received Levaquin. Stop Azithromycin /rocephin that was started overnight. Hepatic encephalopathy-improving:   continue actulose titrate to 3 loose BM daily- will decrease dose since pt now having diarrhea  RUE brachial DVT- new  Discussed with vascular surgery and GI team. Pt very high risk of bleed with anticoagulation. Given location, plan to monitor/treat conservatively. CTPE negative. Acute hypokalemia  Replace potassium   Debility: Needs PTOT   Pneumonia: Continue Levaquin until 1/7  PCM suggested: Dietary consulted, continue feeding. PTOT  Continue breathing treatments.    DVT ppx    Dispo: need PTOT evaluation and possible placement d/c order placed 1/4  Jannie Acevedo DO  1/8/2023  9:05 AM

## 2023-01-08 NOTE — PROGRESS NOTES
Physical Therapy  Facility/Department: [facility] RENAL//MED SURG  Physical Therapy Daily Treatment Note    Name: Talisha Rosa  : 1960  MRN: [id number]  Date of Service: 2023    Discharge Recommendations:  Patient would benefit from continued therapy after discharge   PT Equipment Recommendations  Equipment Needed: No  Other: pt reports he owns RW, recommend use at all times      Patient Diagnosis(es): The encounter diagnosis was Decompensation of cirrhosis of liver ([address] 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 ([address] Alcoholic cirrhosis of liver with ascites ([address] Anemia, Anxiety, Arthritis, Back pain, chronic, [place] esophagus, Bleeding gastric varices, BPH (benign prostatic hypertrophy), Cholelithiasis, Cirrhosis ([address] COVID-19, COVID-19 vaccine series completed, DDD (degenerative disc disease), lumbar, Depression, Esophageal cancer ([address] Esophageal varices ([address] Fatty liver, GERD (gastroesophageal reflux disease), GI bleed, Hep C w/o coma, chronic ([address] History of alcohol abuse, History of blood transfusion, History of colon polyps, History of tobacco abuse, [place] (hard of hearing), Hyperlipidemia, Hypertension, Hyponatremia, Hypotension, Mastoid disorder, bilateral, PONV (postoperative nausea and vomiting), Port-A-Cath in place, Portal hypertension ([address] Sciatica, Secondary esophageal varices ([address] Shortness of breath, Spinal stenosis, Stomach ulcer, Thrombocytopenia ([address] Tubular adenoma of colon, Vitamin D deficiency, and Wears glasses. Past Surgical History:  has a past surgical history that includes Bunionectomy; Nasal septum surgery; other surgical history (2016); Colonoscopy; Colonoscopy (10/05/2016); other surgical history (2016); other surgical history (2016); knee surgery (Left); Bunionectomy (Left); Endoscopy, colon, diagnostic; pr neuroplasty &/transpos median nrv carpal tunne (Right, 2017);  Carpal tunnel release (Right); pr neuroplasty &/transpos median nrv carpal tunne (Left, 10/31/2017); Colonoscopy (N/A, 03/30/2018); Colonoscopy (03/30/2018); pr njx aa&/strd tfrml epi lumbar/sacral 1 level (Bilateral, 09/06/2018); other surgical history (09/28/2018); pr njx aa&/strd tfrml epi lumbar/sacral 1 level (N/A, 09/28/2018); Pain management procedure (Left, 07/09/2020); Pain management procedure (Left, 07/20/2020); Pain management procedure (Bilateral, 08/17/2020); other surgical history (Right, 11/23/2020); Nerve Block (Right, 11/23/2020); Pain management procedure (Bilateral, 12/07/2020); Upper gastrointestinal endoscopy (N/A, 12/29/2020); Upper gastrointestinal endoscopy (N/A, 02/02/2021); Upper gastrointestinal endoscopy (N/A, 02/12/2021); Upper gastrointestinal endoscopy (02/12/2021); [place] tooth extraction; IR PORT PLACEMENT > 5 YEARS (04/19/2021); Upper gastrointestinal endoscopy (N/A, 08/31/2021); Upper gastrointestinal endoscopy (N/A, 01/21/2022); Upper gastrointestinal endoscopy (N/A, 04/15/2022); Colonoscopy (N/A, 04/16/2022); Upper gastrointestinal endoscopy (N/A, 06/06/2022); Upper gastrointestinal endoscopy (N/A, 06/09/2022); Paracentesis; Upper gastrointestinal endoscopy (N/A, 09/14/2022); Upper gastrointestinal endoscopy (N/A, 10/19/2022); Upper gastrointestinal endoscopy (N/A, 10/31/2022); IR TIPS INSERTION (12/01/2022); Esophagogastroduodenoscopy (12/29/2022); Upper gastrointestinal endoscopy (12/29/2022); Esophagogastroduodenoscopy (N/A, 01/03/2023); and Upper gastrointestinal endoscopy (N/A, 1/3/2023). Assessment   Body Structures, Functions, Activity Limitations Requiring Skilled Therapeutic Intervention: Decreased functional mobility ; Decreased strength; Increased pain;Decreased posture;Decreased cognition;Decreased ROM; Decreased endurance;Decreased balance  Assessment: Pt ambulated 60ft x 3 with RW CGA, brief standing rest breaks required between ambulations secondary to fatigue and decreased endurance. Pt able to maintain sitting EOB with supervision. Pt is a fall risk and will require 24hr support upon discharge due to balance deficits and level of assistance required to safely perform functional mobility. Recommending continued skilled physical therapy to address these deficits and return pt to prior level of function. Therapy Prognosis: Good  Requires PT Follow-Up: Yes  Activity Tolerance  Activity Tolerance: Patient tolerated treatment well;Patient limited by endurance     Plan   Physcial Therapy Plan  General Plan:  (5-6x/wk)  Current Treatment Recommendations: Strengthening, ROM, Balance training, Functional mobility training, Transfer training, Endurance training, Gait training, Stair training, Safety education & training, Patient/Caregiver education & training, Equipment evaluation, education, & procurement, Therapeutic activities, Home exercise program  Safety Devices  Type of Devices: Nurse notified, Call light within reach, Patient at risk for falls, Gait belt, Left in bed, Bed alarm in place  Restraints  Restraints Initially in Place: No     Restrictions  Restrictions/Precautions  Restrictions/Precautions: Fall Risk  Required Braces or Orthoses?: No  Position Activity Restriction  Other position/activity restrictions: s/p TIPS revision 12/30/22     Subjective   Pain: Pt denies pain  General  Chart Reviewed: Yes  Patient assessed for rehabilitation services?: Yes  Response To Previous Treatment: Patient with no complaints from previous session. Family / Caregiver Present: No  Follows Commands: Within Functional Limits  General Comment  Comments: Pt left in bed with call light within reach, alarm activated  Subjective  Subjective: Pt and RN agreeable to PT. Pt alert in bed upon arrival, pleasant and cooperative t/o treatment.  Pt denies pain at rest       Cognition   Orientation  Overall Orientation Status: Within Functional Limits  Orientation Level: Oriented X4  Cognition  Overall Cognitive Status: WFL     Objective   O2 Device: None (Room air)     Observation/Palpation  Posture: Good    Bed mobility  Supine to Sit: Supervision (HOB elevated)  Sit to Supine: Supervision  Scooting: Supervision  Bed Mobility Comments: use of bedrails increased time to complete  Transfers  Sit to Stand: Contact guard assistance  Stand to Sit: Contact guard assistance  Comment: assessed with RW support with verbal cues for UE placement with good understanding and fair carryover  Ambulation  Surface: Level tile  Device: Rolling Walker  Assistance: Contact guard assistance  Quality of Gait: slow,steady pace  Gait Deviations: Increased [place]; Decreased step length; Slow [place]  Distance: 60ft x 3  Comments: ambulation unsteady at first but improved with distance  More Ambulation?: No  Stairs/Curb  Stairs?: No     Balance  Posture: Good  Sitting - Static: Good  Sitting - Dynamic: Good  Standing - Static: Fair;+  Standing - Dynamic: Fair  Comments: Standing balance assessed with RW. Exercise Treatment:   Seated LE exercise program: Long Arc Quads, hip abduction/adduction, heel/toe raises, and marches. Reps: 20x   Supine Exercises: Ankle Pumps, Gluteal sets, Quad Sets. Reps:  10x   Dynamic Standing Balance Exercises: pt stood in restroom ~5 mis for activities of hygiene, no LOB noted.  Pt mildly fatigued but tolerated standing activities without requiring seated rest    AM-PAC Score  AM-PAC Inpatient Mobility Raw Score : 19 (01/08/23 1017)  AM-PAC Inpatient T-Scale Score : 45.44 (01/08/23 1017)  Mobility Inpatient CMS 0-100% Score: 41.77 (01/08/23 1017)  Mobility Inpatient CMS G-Code Modifier : CK (01/08/23 1017)    Goals  Short Term Goals  Time Frame for Short Term Goals: 14  Short Term Goal 1: Pt to perform bed mobility independently  Short Term Goal 2: Demonstrate functional transfer independently  Short Term Goal 3: Pt to ambulate 100ft w/ least restrictive AD independently  Short Term Goal 4: Tolerate 30 minutes of therapy to demo increased endurance  Patient Goals   Patient Goals : To go home       Education  Patient Education  Education Given To: Patient  Education Provided: Role of Therapy;Plan of Care;Home Exercise Program;Transfer Training;Energy Conservation; Fall Prevention Strategies  Education Provided Comments: issued HEP for seated and supine B LE exs, all questions answered at this time  Education Method: Demonstration;Verbal;Printed Information/Hand-outs  Barriers to Learning: None  Education Outcome: Verbalized understanding;Demonstrated understanding      Therapy Time   Individual Concurrent Group Co-treatment   Time In 0923         Time Out 1001         Minutes 38         Timed Code Treatment Minutes: [address]

## 2023-01-08 NOTE — PROGRESS NOTES
[facility] -   Infectious diseases evaluation    Progress Note    admission date 12/28/2022    reason for consultation:   Possible SBP    Impression :   Current:  Bilateral pleural effusions   Bilateral atelectasis, vs pneumonia   Small pericardial effusion  Cirrhosis post TIPS, with ascites and esophageal varices   Hepatitis C as cause for cirrhosis  S/P treatment  Elevated CRP  1/3 rectal bleed - EGD    Other:  History of esophageal cancer/Lezama's esophagus  Discussion / summary of stay / plan of care     Recommendations   Completed treatment for bilateral lower lobe consolidations possibly pneumonia on 1-7-23  Initial treatment: cefepime / vanco 1/1/23-->1/3/23  Levaquin 1/3/23 through 1/7/23      Infection Control Recommendations   Brick Precautions  Contact Isolation     Antimicrobial Stewardship Recommendations   Simplification of therapy  Targeted therapy      History of Present Illness:   Initial history:  Casandra Cyr is a 60y.o.-year-old male who presented to the hospital 12/29 because of weakness fatigue, recurrent upper GI bleed with melena and anemia. Admitted for GI work-up, he was having bloody stools/melena. Initially seen at [facility] started on octreotide, transferred to [facility] for reevaluation of the TIPS    CT abdomen showed rectal spasm with fluid in the rectum. TIPS was apparently patent. Ascites sample 12/28 was culture negative, And the number of WBCs was not suggestive of infection. A repeat paracentesis was performed 1/3/23, culture: No growth     CT abdomen and pelvis suggested possible bilateral lower lobe atelectasis. Upon review these changes could potentially represent pneumonia. Infectious disease consulted to manage antibiotics, the patient was started on broad spectrum antibiotics on 1/1, which he completed on 1-7-23.     Interval changes  1/8/2023   Patient Vitals for the past 8 hrs:   BP Temp Temp src Pulse Resp SpO2 Weight   01/08/23 0630 (!) 102/53 98 °F (36.7 °C) Oral 79 16 99 % --   01/08/23 0600 -- -- -- -- -- -- 216 lb (98 kg)     1/3/23  Paracenthesis 1/3 2200ml yellow clear fluid  WBC 90 and cx pend - doubt infected    Had EGD 1/3 which shows no bleed, grade 1-2 varices distal esophagus and few AVMs and mild diffuse portal HTN    1/5/23  Increased shortness of breath, he is on room air at this point, abdomen distended,  Tips have been revisited on 12/30 1/6/23  Paracentesis today  Tomorrow will be last day of levaquin  He is still SOB  but on RA  Eating ok-     CURRENT EVALUATION : 1/8/2023    Afebrile  VS stable    Patient feels better  No complaints  No new issues per RN    Comfortable on room air    Completed antibiotic treatment    Summary of relevant labs: 1/8/2023    Labs:  Procalcitonin0.15 High      CRP31.8 High      ALT32U/L XSU073 High      BUN 6  Creatinine 0.51-->0.45  Na 135  K 3.5    WBC 5.2-->5.9  Hb 8.0  Plat 153    Micro:  Ascites Fluid:   12/28/22: No growth   1-3-23: No growth     Imaging:  CXR 1/3/23  Increasing vascular congestion with small pleural effusions, suggestive of   developing edema. Echo  Left ventricle is normal in size. Global left ventricular systolic function   is normal. Calculated ejection fraction 60% by Heart Model. No significant valvular regurgitation or stenosis seen. Moderate circumferential pericardial effusion seen. Measures 2.2cm   anteriorly and 2.26cm posteriorly. Clot-like structure noted in anterior pericardial space. No signs of tamponade. Consider clinical correlation. CTAP  1/3/23  There is a short segment of narrowing of the rectum near the rectosigmoid   junction compared to the recent exam and may represent spasm rather than   underlying mass. Fluid-filled loops of small bowel and colon suggesting   diarrhea with enteritis or colitis. Evaluation of true wall thickening is   limited by the diffuse edematous state.      2. Cirrhotic liver with tips, ascites, and sequela of portal hypertension. 3.  Thickening of the gastric mucosal folds and into the distal esophagus. Correlate with prior history of malignancy. 4.  Calcified thrombosed splenic artery aneurysm in the left upper quadrant   measuring up to 3.1 cm but stable from previous exams. 5.  Pleural effusions and pericardial effusion are redemonstrated. The   opacified portions of the lower lungs appears to be from compressive   atelectasis. I have personally reviewed the past medical history, past surgical history, medications, social history, and family history, and I haveupdated the database accordingly. Allergies:   Patient has no known allergies. Review of Systems:     Review of Systems   Constitutional:  Positive for activity change. Negative for diaphoresis and fatigue. HENT:  Negative for congestion and rhinorrhea. Eyes:  Negative for discharge and redness. Respiratory:  Positive for shortness of breath. Negative for apnea and cough. Cardiovascular:  Positive for leg swelling. Negative for chest pain. Gastrointestinal:  Positive for abdominal distention. Negative for abdominal pain. Endocrine: Negative for cold intolerance and polyphagia. Genitourinary:  Negative for difficulty urinating, hematuria and urgency. Musculoskeletal:  Negative for arthralgias. Skin:  Negative for color change. Allergic/Immunologic: Negative for immunocompromised state. Neurological:  Negative for dizziness, seizures and numbness. Hematological:  Negative for adenopathy. Psychiatric/Behavioral:  Negative for agitation and confusion. Physical Examination :       Physical Exam  Constitutional:       Appearance: Normal appearance. He is not ill-appearing. HENT:      Head: Normocephalic and atraumatic. Nose: Nose normal. No congestion or rhinorrhea.       Mouth/Throat:      Mouth: Mucous membranes are moist.      Pharynx: No oropharyngeal exudate. Eyes:      General: No scleral icterus. Conjunctiva/sclera: Conjunctivae normal.   Cardiovascular:      Rate and Rhythm: Normal rate and regular rhythm. Heart sounds: Normal heart sounds. No murmur heard. Pulmonary:      Breath sounds: No stridor. No rhonchi. Abdominal:      General: There is distension. Palpations: There is no mass. Tenderness: There is no abdominal tenderness. There is no guarding. Hernia: No hernia is present. Genitourinary:     Rectum: Guaiac result negative. Comments: Urine dark yellow  Musculoskeletal:         General: No swelling, tenderness, deformity or signs of injury. Cervical back: Neck supple. No rigidity. Skin:     Capillary Refill: Capillary refill takes less than 2 seconds. Coloration: Skin is not jaundiced. Findings: Bruising present. Neurological:      Mental Status: He is alert. Cranial Nerves: No cranial nerve deficit. Psychiatric:         Mood and Affect: Mood normal.         Thought Content:  Thought content normal.       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 James Canales, orthopedic, every 3-4 months, gets steroid injection    Lezama esophagus     BPH (benign prostatic hypertrophy)     Cholelithiasis     Cirrhosis ([address]     COVID-19 12/2020    pt reports he had a positive test while at [facility] in 2020, was asymptomatic    COVID-19 vaccine series completed 5/20/2021, 6/22/2021    Moderna 5/20/2021, 6/22/2021    DDD (degenerative disc disease), lumbar     Depression     Esophageal cancer ([address]     INVASIVE ADENOCARCINOMA ARISING IN TUBULAR ADENOMA WITH HIGH GRADE DYSPLASIA, ASSOCIATED WITH FOCAL INTESTINAL METAPLASIA     Esophageal varices ([address]     Fatty liver     GERD (gastroesophageal reflux disease) GI bleed     Hep C w/o coma, chronic (HCC)     History of alcohol abuse     6-12 beers a day; quit drinking 2019    History of blood transfusion     History of colon polyps 2016    History of tobacco abuse     [place] (hard of hearing)     Hyperlipidemia     Hypertension     Hyponatremia 07/20/2016    Hypotension 12/20/2021    Mastoid disorder, bilateral 12/31/2022    biLateral mastoid effusion    PONV (postoperative nausea and vomiting)     Port-A-Cath in place     right upper chest    Portal hypertension (HCC)     Sciatica     Secondary esophageal varices ([address] 06/07/2022    Shortness of breath     Spinal stenosis     Stomach ulcer     hx of    Thrombocytopenia ([facility] 12/23/2020    Tubular adenoma of colon 2016, 2018    Vitamin D deficiency     Wears glasses        Past Surgical  History:     Past Surgical History:   Procedure Laterality Date    BUNIONECTOMY      twice on right side    BUNIONECTOMY Left     CARPAL TUNNEL RELEASE Right     COLONOSCOPY      at age 35    COLONOSCOPY  10/05/2016    polyps-pathology tubular adenoma, and abnormal looking mucosa right colon-pathology-tubular adenoma    COLONOSCOPY N/A 03/30/2018    COLONOSCOPY POLYPECTOMY COLD BIOPSY performed by Sean Baker MD at [facility] [address],[facility]  03/30/2018    Small polyp in the sigmoid colon and excised with biopsy forceps--tubular adenoma    COLONOSCOPY N/A 04/16/2022    COLONOSCOPY POLYPECTOMY performed by Sean Baker MD at [facility], DIAGNOSTIC      EGD    ESOPHAGOGASTRODUODENOSCOPY  12/29/2022    ESOPHAGOGASTRODUODENOSCOPY N/A 01/03/2023    IR PORT PLACEMENT EQUAL OR GREATER THAN 5 YEARS  04/19/2021    IR PORT PLACEMENT EQUAL OR GREATER THAN 5 YEARS 4/19/2021 [facility]    IR TIPS INSERTION  12/01/2022    IR TIPS INSERTION 12/1/2022 Wenda Habermann, MD [facility]    KNEE SURGERY Left     cyst removed    NASAL SEPTUM SURGERY      NERVE BLOCK Right 11/23/2020    NERVE BLOCK RIGHT CERVICAL STEROID INJECTION  C3-C6 performed by Maria Antonia Boyce MD at [address]  01/04/2016    steroid injection C7 T1    OTHER SURGICAL HISTORY  11/21/2016    Bilateral Lumbar CACHORRO L4-L5 injections    OTHER SURGICAL HISTORY  12/19/2016    lumbar steroid injection    OTHER SURGICAL HISTORY  09/28/2018    BILATERAL L5 CACHORRO (N/A Back)    OTHER SURGICAL HISTORY Right 11/23/2020    cervical injection    PAIN MANAGEMENT PROCEDURE Left 07/09/2020    EPIDURAL STEROID INJECTION LEFT L4 L5 performed by Maria Antonia Boyce MD at [facility] Left 07/20/2020    LEFT L4 L5 EPIDURAL STEROID INJECTION performed by Maria Antonia Boyce MD at [facility] Bilateral 08/17/2020    LUMBAR FACET BILATERAL L2-L5 performed by Maria Antonia Boyce MD at [facility] Bilateral 12/07/2020    NERVE BLOCK BILATERAL LUMBAR MEDIAL BRANCH L2-L5 performed by Maria Antonia Boyce MD at [address] [place] [address]    AR NEUROPLASTY &/TRANSPOS MEDIAN NRV CARPAL TUNNE Right 08/29/2017    CARPAL TUNNEL RELEASE RIGHT performed by Mel Mcneill MD at [address] &/TRANSPOS MEDIAN NRV CARPAL TUNNE Left 10/31/2017    CARPAL TUNNEL RELEASE performed by Mel Mcneill MD at [facility] [address] AA&/STRD TFRML EPI LUMBAR/SACRAL 1 LEVEL Bilateral 09/06/2018    BILATERAL L5 CACHORRO performed by Maria Antonia Boyce MD at [facility] [address] AA&/STRD TFRML EPI LUMBAR/SACRAL 1 LEVEL N/A 09/28/2018    BILATERAL L5 CACHORRO performed by Maria Antonia Boyce MD at [facility] 12/29/2020    EGD BIOPSY performed by Wang Jeffers MD at [address] [place] [address] 02/02/2021    EGD BIOPSY and spot marking performed by Be Hansen MD at [address] [place] [address] N/A 02/12/2021    ENDOSCOPIC ULTRASOUND, EGD performed by Joaquin Thomson MD at [place] [address]  02/12/2021    EGD DIAGNOSTIC ONLY performed by Jian Murillo MD at [facility]    UPPER GASTROINTESTINAL ENDOSCOPY N/A 08/31/2021    EGD BIOPSY performed by Phil Naranjo MD at [address] [place] [address] 01/21/2022    EGD BIOPSY performed by Phil Naranjo MD at [address] [place] [address] N/A 04/15/2022    EGD ESOPHAGOGASTRODUODENOSCOPY performed by Yuly Syed MD at [address] [place] [address] N/A 06/06/2022    EGD BAND LIGATION performed by Opal Terry MD at [address] [place] [address] N/A 06/09/2022    EGD ESOPHAGOGASTRODUODENOSCOPY performed by Opal Terry MD at [address] [place] [address] N/A 09/14/2022    EGD ESOPHAGOGASTRODUODENOSCOPY ENDOSCOPIC APC AT [facility] performed by Celine Carvalho MD at [address] [place] [address] N/A 10/19/2022    EGD BIOPSY performed by Phil Naranjo MD at [address] [place] [address] N/A 10/31/2022    EGD with APC performed by Phil Naranjo MD at [address] [place] [address]  12/29/2022    EGD ESOPHAGOGASTRODUODENOSCOPY performed by Jian Murillo MD at [address] [facility] 1/3/2023    EGD ESOPHAGOGASTRODUODENOSCOPY performed by Marsha Keith MD at [address] [facility]         Medications:      furosemide  20 mg Oral BID    cefTRIAXone (ROCEPHIN) IV  1,000 mg IntraVENous Q24H    azithromycin  500 mg IntraVENous Q24H    lactulose  10 g Oral TID    pantoprazole  40 mg Oral QAM AC    levoFLOXacin  500 mg Oral Daily    insulin lispro  0.05 Units/kg SubCUTAneous TID WC    potassium chloride  10 mEq Oral BID    insulin lispro  0-4 Units SubCUTAneous TID WC    insulin lispro  0-4 Units SubCUTAneous Nightly    sodium chloride flush  5-40 mL IntraVENous 2 times per day    [Held by provider] baclofen  10 mg Oral TID    sodium chloride flush  5-40 mL IntraVENous 2 times per day Social History:     Social History     Socioeconomic History    Marital status: Single     Spouse name: Not on file    Number of children: Not on file    Years of education: Not on file    Highest education level: Not on file   Occupational History    Not on file   Tobacco Use    Smoking status: Former     Packs/day: 1.00     Years: 45.00     Pack years: 45.00     Types: Cigarettes     Quit date: 2017     Years since quittin.9    Smokeless tobacco: Never   Vaping Use    Vaping Use: Never used   Substance and Sexual Activity    Alcohol use: Not Currently     Comment: Quit alcohol in - heavier drinking prior to quitting    Drug use: Not Currently     Frequency: 1.0 times per week     Types: Cocaine     Comment: Cocaine- stopped spring 2016    Sexual activity: Yes     Partners: Female   Other Topics Concern    Not on file   Social History Narrative     in the past, retired     Social Determinants of Health     Financial Resource Strain: Low Risk     Difficulty of Paying Living Expenses: Not hard at all   Food Insecurity: No Food Insecurity    Worried About [address] in the Last Year: Never true    [address] in the Last Year: Never true   Transportation Needs: Not on file   Physical Activity: Inactive    Days of Exercise per Week: 0 days    Minutes of Exercise per Session: 0 min   Stress: Not on file   Social Connections: Not on file   Intimate Partner Violence: Not on file   Housing Stability: Not on file       Family History:     Family History   Problem Relation Age of Onset    Cancer Mother         pancreatic    Cancer Father         bone    Diabetes Sister     Asthma Brother     Allergies Brother         MULTIPLE      Medical Decision Making:   I have independently reviewed/ordered the following labs:    CBC with Differential:   Recent Labs     23  2336   WBC 5.9   HGB 8.0*   HCT 27.7*      LYMPHOPCT 11*   MONOPCT 9*       BMP:  Recent Labs 01/07/23 1919      K 3.5*   *   CO2 20   BUN 6*   CREATININE 0.45*   MG 1.3*       Hepatic Function Panel:   No results for input(s): PROT, LABALBU, BILIDIR, IBILI, BILITOT, ALKPHOS, ALT, AST in the last 72 hours. No results for input(s): RPR in the last 72 hours. No results for input(s): HIV in the last 72 hours. No results for input(s): BC in the last 72 hours. Lab Results   Component Value Date/Time    CREATININE 0.45 01/07/2023 07:19 PM    GLUCOSE 119 01/07/2023 07:19 PM    GLUCOSE 65 04/19/2012 11:30 AM       Detailed results: Thank you for allowing us to participate in the care of this patient. Please call with questions. This note is created with the assistance of a speech recognition program.  While intending to generate adocument that actually reflects the content of the visit, the document can still have some errors including those of syntax and sound a like substitutions which may escape proof reading. It such instances, actual meaningcan be extrapolated by contextual diversion.     Zofia Thomas MD

## 2023-01-08 NOTE — PLAN OF CARE
Problem: Pain  Goal: Verbalizes/displays adequate comfort level or baseline comfort level  1/8/2023 1707 by Keegan Dhillon RN  Outcome: Progressing  1/8/2023 0530 by Yadi Valadez RN  Outcome: Progressing

## 2023-01-08 NOTE — CONSULTS
Division of Vascular Surgery        New Consult      Physician Requesting Consult:  Right upper extremity DVT    Reason for Consult:   RUE DVT    Chief Complaint:      Abdominal pain     History of Present Illness:      Fred Rosenberg is a 60 y.o. gentleman with history of esophageal cancer, hepatic encephalopathy, EtOH history with decompensated liver failure status post with TIPS who presents with abdominal pain  on 12/28/22. Patient was found to have low hemoglobin and extensive ascites. Patient developed right upper extremity swelling. Duplex US demonstrated thrombosis of the brachial vein and cephalic vein with absent flow in the forearm. Vascular surgery was consulted for recommendations. Patient is confused on examination and minimally cooperative.      Medical History:     Past Medical History:   Diagnosis Date    Adenocarcinoma in a polyp ([address]     Alcoholic cirrhosis of liver with ascites ([address]     Anemia 04/13/2022    Anxiety     Arthritis     Back pain, chronic     dr. Abel Hanks, orthopedic, every 3-4 months, gets steroid injection    Lezama esophagus     BPH (benign prostatic hypertrophy)     Cholelithiasis     Cirrhosis ([address]     COVID-19 12/2020    pt reports he had a positive test while at [facility] in 2020, was asymptomatic    COVID-19 vaccine series completed 5/20/2021, 6/22/2021    Moderna 5/20/2021, 6/22/2021    DDD (degenerative disc disease), lumbar     Depression     Esophageal cancer ([address]     INVASIVE ADENOCARCINOMA ARISING IN TUBULAR ADENOMA WITH HIGH GRADE DYSPLASIA, ASSOCIATED WITH FOCAL INTESTINAL METAPLASIA     Esophageal varices ([address]     Fatty liver     GERD (gastroesophageal reflux disease)     GI bleed     Hep C w/o coma, chronic ([address]     History of alcohol abuse     6-12 beers a day; quit drinking 2019    History of blood transfusion     History of colon polyps 2016    History of tobacco abuse     [place] (hard of hearing)     Hyperlipidemia     Hypertension     Hyponatremia 07/20/2016    Hypotension 12/20/2021    Mastoid disorder, bilateral 12/31/2022    biLateral mastoid effusion    PONV (postoperative nausea and vomiting)     Port-A-Cath in place     right upper chest    Portal hypertension (HCC)     Sciatica     Secondary esophageal varices ([address] 06/07/2022    Shortness of breath     Spinal stenosis     Stomach ulcer     hx of    Thrombocytopenia ([address] 12/23/2020    Tubular adenoma of colon 2016, 2018    Vitamin D deficiency     Wears glasses        Surgical History:     Past Surgical History:   Procedure Laterality Date    BUNIONECTOMY      twice on right side    BUNIONECTOMY Left     CARPAL TUNNEL RELEASE Right     COLONOSCOPY      at age 35    COLONOSCOPY  10/05/2016    polyps-pathology tubular adenoma, and abnormal looking mucosa right colon-pathology-tubular adenoma    COLONOSCOPY N/A 03/30/2018    COLONOSCOPY POLYPECTOMY COLD BIOPSY performed by Fina Hong MD at [place] [id number]  03/30/2018    Small polyp in the sigmoid colon and excised with biopsy forceps--tubular adenoma    COLONOSCOPY N/A 04/16/2022    COLONOSCOPY POLYPECTOMY performed by Fina Hong MD at [facility], DIAGNOSTIC      EGD    ESOPHAGOGASTRODUODENOSCOPY  12/29/2022    ESOPHAGOGASTRODUODENOSCOPY N/A 01/03/2023    IR PORT PLACEMENT EQUAL OR GREATER THAN 5 YEARS  04/19/2021    IR PORT PLACEMENT EQUAL OR GREATER THAN 5 YEARS 4/19/2021 [facility] SPECIAL PROCEDURES    IR TIPS INSERTION  12/01/2022    IR TIPS INSERTION 12/1/2022 Marlin Olmedo MD [facility] SPECIAL PROCEDURES    KNEE SURGERY Left     cyst removed    NASAL SEPTUM SURGERY      NERVE BLOCK Right 11/23/2020    NERVE BLOCK RIGHT CERVICAL STEROID INJECTION  C3-C6 performed by Nick Soler MD at [place] [id number]  01/04/2016    steroid injection C7 T1    OTHER SURGICAL HISTORY  11/21/2016    Bilateral Lumbar CACHORRO L4-L5 injections    OTHER SURGICAL HISTORY  12/19/2016    lumbar steroid injection    OTHER SURGICAL HISTORY  09/28/2018    BILATERAL L5 CACHORRO (N/A Back)    OTHER SURGICAL HISTORY Right 11/23/2020    cervical injection    PAIN MANAGEMENT PROCEDURE Left 07/09/2020    EPIDURAL STEROID INJECTION LEFT L4 L5 performed by Eduardo Culp MD at [facility] Left 07/20/2020    LEFT L4 L5 EPIDURAL STEROID INJECTION performed by Eduardo Culp MD at [facility] Bilateral 08/17/2020    LUMBAR FACET BILATERAL L2-L5 performed by Eduardo Culp MD at [facility] Bilateral 12/07/2020    NERVE BLOCK BILATERAL LUMBAR MEDIAL BRANCH L2-L5 performed by Eduardo Culp MD at [address] NEUROPLASTY &/TRANSPOS MEDIAN NRV CARPAL Sabrina Jaya Right 08/29/2017    CARPAL TUNNEL RELEASE RIGHT performed by Charlie Pride MD at [address] &/TRANSPOS MEDIAN NRV CARPAL TUNNE Left 10/31/2017    CARPAL TUNNEL RELEASE performed by Charlie Pride MD at [facility] [address] AA&/STRD TFRML EPI LUMBAR/SACRAL 1 LEVEL Bilateral 09/06/2018    BILATERAL L5 CACHORRO performed by Eduardo Culp MD at [facility] [address] AA&/STRD TFRML EPI LUMBAR/SACRAL 1 LEVEL N/A 09/28/2018    BILATERAL L5 CACHORRO performed by Eduardo Culp MD at [facility] [address] N/A 12/29/2020    EGD BIOPSY performed by Violetta Leija MD at [address] [place] [address] 02/02/2021    EGD BIOPSY and spot marking performed by Jessica Carver MD at [address] [place] [address] N/A 02/12/2021    ENDOSCOPIC ULTRASOUND, EGD performed by Rachel Turpin MD at [facility] 1  02/12/2021    EGD DIAGNOSTIC ONLY performed by Rachel Turpin MD at [facility] 1 08/31/2021    EGD BIOPSY performed by Jessica Carver MD at [address] [place] [address] 01/21/2022    EGD BIOPSY performed by Jessica Carver MD at [place] [address] UPPER GASTROINTESTINAL ENDOSCOPY N/A 04/15/2022    EGD ESOPHAGOGASTRODUODENOSCOPY performed by Dea Shaw MD at [facility]. 106 N/A 06/06/2022    EGD BAND LIGATION performed by Martínez Quigley MD at [facility]. 106 N/A 06/09/2022    EGD ESOPHAGOGASTRODUODENOSCOPY performed by Martínez Quigley MD at [facility]. 106 N/A 09/14/2022    EGD ESOPHAGOGASTRODUODENOSCOPY ENDOSCOPIC APC AT GE JUNCTION performed by Bita Kan MD at [facility]. 106 N/A 10/19/2022    EGD BIOPSY performed by Kristy Cooper MD at [facility]. 106 10/31/2022    EGD with APC performed by Kristy Cooper MD at [facility]. 106  12/29/2022    EGD ESOPHAGOGASTRODUODENOSCOPY performed by Noemi Ortiz MD at [facility] 253 1/3/2023    EGD ESOPHAGOGASTRODUODENOSCOPY performed by Gilda Claros MD at [address] [place] [address]         Family History:     Family History   Problem Relation Age of Onset    Cancer Mother         pancreatic    Cancer Father         bone    Diabetes Sister     Asthma Brother     Allergies Brother         MULTIPLE       Allergies:       Patient has no known allergies.     Medications:      Current Facility-Administered Medications   Medication Dose Route Frequency Provider Last Rate Last Admin    ipratropium-albuterol (DUONEB) nebulizer solution 1 ampule  1 ampule Inhalation Q4H PRN Prosperammad I Rosalindhaleh, DO   1 ampule at 01/07/23 1301    furosemide (LASIX) tablet 20 mg  20 mg Oral BID Mohammad I Mashaleh, DO   20 mg at 01/07/23 1557    lactulose (CHRONULAC) 10 GM/15ML solution 10 g  10 g Oral TID MASSIMO Martin - CNP   10 g at 01/07/23 1406    pantoprazole (PROTONIX) tablet 40 mg  40 mg Oral QAM AC Mohammad I Mashaleh, DO   40 mg at 01/07/23 0617    levoFLOXacin (LEVAQUIN) tablet 500 mg 500 mg Oral Daily Jewel Mauro Leosber, APRN - CNP   500 mg at 01/07/23 5076    potassium chloride (KLOR-CON M) extended release tablet 40 mEq  40 mEq Oral PRN Jewel A Ivin Brantley, APRN - CNP   40 mEq at 01/03/23 0818    Or    potassium bicarb-citric acid (EFFER-K) effervescent tablet 40 mEq  40 mEq Oral PRN Jewel A Haven, APRN - CNP        Or    potassium chloride 10 mEq/100 mL IVPB (Peripheral Line)  10 mEq IntraVENous PRN Jewel A Haven, APRN - CNP        magnesium sulfate 2000 mg in 50 mL IVPB premix  2,000 mg IntraVENous PRN Jewel A Haven, APRN - CNP        sodium phosphate 10 mmol in sodium chloride 0.9 % 250 mL IVPB  10 mmol IntraVENous PRN Jewel A Haven, APRN - CNP        Or    sodium phosphate 15 mmol in sodium chloride 0.9 % 250 mL IVPB  15 mmol IntraVENous PRN Jewel A Haven, APRN - CNP        Or    sodium phosphate 20 mmol in sodium chloride 0.9 % 500 mL IVPB  20 mmol IntraVENous PRN Jewel A Haven, APRN - CNP        glucose chewable tablet 16 g  4 tablet Oral PRN Jewel A Haven, APRN - CNP        dextrose bolus 10% 125 mL  125 mL IntraVENous PRN Jewel A Haven, APRN - CNP        Or    dextrose bolus 10% 250 mL  250 mL IntraVENous PRN Jewel A Haven, APRN - CNP        glucagon (rDNA) injection 1 mg  1 mg SubCUTAneous PRN Jewel A Haven, APRN - CNP        dextrose 10 % infusion   IntraVENous Continuous PRN Jewel A Haven, APRN - CNP        insulin lispro (HUMALOG) injection vial 4 Units  0.05 Units/kg SubCUTAneous TID WC Jewel A Haven, APRN - CNP   4 Units at 01/07/23 7058    potassium chloride (KLOR-CON M) extended release tablet 10 mEq  10 mEq Oral BID Jewel A Ivin Brantley, APRN - CNP   10 mEq at 01/07/23 2035    glucose chewable tablet 16 g  4 tablet Oral PRN Jewel A Haven, APRN - CNP        dextrose bolus 10% 125 mL  125 mL IntraVENous PRN Jewel A Haven, APRN - CNP        Or    dextrose bolus 10% 250 mL  250 mL IntraVENous PRN Jewel A Haven, APRN - CNP        glucagon (rDNA) injection 1 mg  1 mg SubCUTAneous PRN Jewel A Haven, APRN - CNP        dextrose 10 % infusion   IntraVENous Continuous PRN Jewel A Haven, APRN - CNP        insulin lispro (HUMALOG) injection vial 0-4 Units  0-4 Units SubCUTAneous TID WC Jewel A Haven, APRN - CNP        insulin lispro (HUMALOG) injection vial 0-4 Units  0-4 Units SubCUTAneous Nightly Jewel A Haven, APRN - CNP        sodium chloride flush 0.9 % injection 5-40 mL  5-40 mL IntraVENous 2 times per day Raymond Ferguson APRN - CNP   10 mL at 01/07/23 2035    sodium chloride flush 0.9 % injection 5-40 mL  5-40 mL IntraVENous PRN Jewel A Haven, APRN - CNP        0.9 % sodium chloride infusion   IntraVENous PRN Jewel A Haven, APRN - CNP        [Held by provider] baclofen (LIORESAL) tablet 10 mg  10 mg Oral TID Ishan Real MD   10 mg at 01/03/23 0820    sodium chloride flush 0.9 % injection 5-40 mL  5-40 mL IntraVENous 2 times per day Raymond Ferguson APRN - CNP   10 mL at 01/05/23 0848    sodium chloride flush 0.9 % injection 5-40 mL  5-40 mL IntraVENous PRN Jewel A Haven, APRN - CNP        ondansetron (ZOFRAN-ODT) disintegrating tablet 4 mg  4 mg Oral Q8H PRN Jewel A Haven, APRN - CNP   4 mg at 01/07/23 1728    Or    ondansetron (ZOFRAN) injection 4 mg  4 mg IntraVENous Q6H PRN Jewel A Haven, APRN - CNP           Social History:     Tobacco:    reports that he quit smoking about 5 years ago. His smoking use included cigarettes. He has a 45.00 pack-year smoking history. He has never used smokeless tobacco.  Alcohol:      reports that he does not currently use alcohol. Drug Use:  reports that he does not currently use drugs after having used the following drugs: Cocaine. Frequency: 1.00 time per week. Review of Systems:     Review of Systems   Unable to perform ROS: Acuity of condition   Constitutional:  Positive for fatigue. HENT:  Negative for facial swelling. Eyes:  Negative for pain.    Respiratory:  Negative for cough and shortness of breath. Gastrointestinal:  Positive for abdominal distention and abdominal pain. Genitourinary:  Negative for penile discharge. Physical Exam:     Vitals:  /63   Pulse 87   Temp 98.1 °F (36.7 °C) (Oral)   Resp 18   Ht 5' 10\" (1.778 m)   Wt 226 lb 13.7 oz (102.9 kg)   SpO2 99%   BMI 32.55 kg/m²     Physical Exam  Constitutional:       General: He is not in acute distress. Appearance: He is not toxic-appearing. HENT:      Head: Normocephalic. Mouth/Throat:      Mouth: Mucous membranes are dry. Comments: Jaundice    Eyes:      General: Scleral icterus present. Extraocular Movements: Extraocular movements intact. Cardiovascular:      Rate and Rhythm: Normal rate and regular rhythm. Pulses: Normal pulses. Pulmonary:      Effort: No respiratory distress. Chest:      Chest wall: No tenderness. Abdominal:      General: There is distension. Tenderness: There is no abdominal tenderness. Musculoskeletal:         General: Swelling present. Cervical back: Neck supple. No tenderness. Skin:     Coloration: Skin is jaundiced. Findings: Bruising and lesion present. Neurological:      Mental Status: He is disoriented. Psychiatric:      Comments: Impaired judgement       Imaging/Labs:     VL DUP UPPER EXTREMITY VENOUS RIGHT   Final Result      US GUIDED PARACENTESIS   Final Result   Successful ultrasound-guided paracentesis. IR US GUIDED PARACENTESIS   Final Result   Successful ultrasound-guided therapeutic paracentesis. XR CHEST PORTABLE   Final Result   Increasing vascular congestion with small pleural effusions, suggestive of   developing edema. CT ABDOMEN PELVIS W IV CONTRAST Additional Contrast? Radiologist Recommendation   Final Result   1. There is a short segment of narrowing of the rectum near the rectosigmoid   junction compared to the recent exam and may represent spasm rather than   underlying mass.   Fluid-filled loops of small bowel and colon suggesting   diarrhea with enteritis or colitis. Evaluation of true wall thickening is   limited by the diffuse edematous state. 2.  Cirrhotic liver with tips, ascites, and sequela of portal hypertension. 3.  Thickening of the gastric mucosal folds and into the distal esophagus. Correlate with prior history of malignancy. 4.  Calcified thrombosed splenic artery aneurysm in the left upper quadrant   measuring up to 3.1 cm but stable from previous exams. 5.  Pleural effusions and pericardial effusion are redemonstrated. The   opacified portions of the lower lungs appears to be from compressive   atelectasis. XR CHEST PORTABLE   Final Result   Mild pulmonary vascular congestion without evidence of overt edema         XR RADIUS ULNA RIGHT (2 VIEWS)   Final Result   No fracture or dislocation. Mild soft tissue swelling         MRI BRAIN WO CONTRAST   Final Result   No evidence of acute ischemia. New moderate-sized bilateral mastoid effusions. Small old lacune within the left basal ganglia. New low T1 signal within the marrow elements diffusely likely representing a   marrow replacement process such as anemia. XR CHEST PORTABLE   Final Result   Little change from prior study. Cardiomegaly, vascular congestion,   effusions, atelectasis and possible superimposed acute airspace disease are   again noted. CT HEAD WO CONTRAST   Final Result   CT BRAIN:      1. No evidence of acute hemorrhage, large territorial hypodensity,   significant mass effect/midline shift, or ventriculomegaly   2. Involutional parenchymal changes. 3. Age-indeterminate (likely chronic) left basal ganglia lacunar infarct. If   clinically indicated, can consider further evaluation with MRI if no   contraindications. CT CERVICAL SPINE:      1. No acute abnormality of the cervical spine. 2. Multilevel cervical spondylosis, most notable at C5-C6 and C6-C7.  No high-grade bony spinal canal stenosis. CT CERVICAL SPINE WO CONTRAST   Final Result   CT BRAIN:      1. No evidence of acute hemorrhage, large territorial hypodensity,   significant mass effect/midline shift, or ventriculomegaly   2. Involutional parenchymal changes. 3. Age-indeterminate (likely chronic) left basal ganglia lacunar infarct. If   clinically indicated, can consider further evaluation with MRI if no   contraindications. CT CERVICAL SPINE:      1. No acute abnormality of the cervical spine. 2. Multilevel cervical spondylosis, most notable at C5-C6 and C6-C7. No   high-grade bony spinal canal stenosis. IR REVISION TIPS   Final Result   TIPS stent reassessment showing 6 mm Hg portosystemic shunt; no significant   variceal filling demonstrated, but successful left gastric vein embolization   performed. TIPS stent angioplasty to 10 mm with excellent flow demonstrated. The findings were discussed with Dr. Kya Camargo post procedure. US GALLBLADDER RUQ   Final Result   Multiple cholelithiasis but no ultrasound evidence cholecystitis. Nondilated   biliary tree. Cirrhosis. Patent TIPS. Ascites. Additional findings, as   above. XR CHEST PORTABLE   Final Result   Cardiomegaly with probable mild central vascular congestion and similar   bilateral pleural effusions with compressive atelectasis; filtrate at either   base a consideration. No pulmonary edema. CT CHEST PULMONARY EMBOLISM W CONTRAST    (Results Pending)         Assessment and Plan:     Recommend anticoagulation for upper extremity DVT however due to patients recent history of GI bleed, will management conservatively with compression wrap and elevation. ACE bandage applied at bedside, if patient complains of numbness, remove Ace wrap and replace as tolerated.      Electronically signed by Jaciel Jacob MD on 1/7/23 at 8:39 PM [facility]      [address] [place] [address] Street: [phone]  C: [phone]  Email: [email or web address]

## 2023-01-08 NOTE — PROGRESS NOTES
[facility] -   Infectious diseases evaluation  admission date 12/28/2022    reason for consultation:   Possible SBP    Impression :   Current:  Bilateral pleural effusions   Bilateral atelectasis, vs pneumonia   Small pericardial effusion  Cirrhosis post TIPS, with ascites and esophageal varices   Hepatitis C as cause for cirrhosis  S/P treatment  Elevated CRP  1/3 rectal bleed - EGD    Other:  History of esophageal cancer/Lezama's esophagus  Discussion / summary of stay / plan of care     Recommendations   Completed treatment for bilateral lower lobe consolidations possibly pneumonia on 1-7-23  Initial treatment: cefepime / vanco 1/1/23-->1/3/23  Levaquin 1/3/23 through 1/7/23      Infection Control Recommendations   [place] Precautions  Contact Isolation     Antimicrobial Stewardship Recommendations   Simplification of therapy  Targeted therapy      History of Present Illness:   Initial history:  Sana Irving is a 60y.o.-year-old male who presented to the hospital 12/29 because of weakness fatigue, recurrent upper GI bleed with melena and anemia. Admitted for GI work-up, he was having bloody stools/melena. Initially seen at [facility] started on octreotide, transferred to [facility] for reevaluation of the TIPS    CT abdomen showed rectal spasm with fluid in the rectum. TIPS was apparently patent. Ascites sample 12/28 was culture negative, And the number of WBCs was not suggestive of infection. A repeat paracentesis was performed 1/3/23, culture: No growth     CT abdomen and pelvis suggested possible bilateral lower lobe atelectasis. Upon review these changes could potentially represent pneumonia. Infectious disease consulted to manage antibiotics, the patient was started on broad spectrum antibiotics on 1/1, which he completed on 1-7-23.     Interval changes  1/7/2023   Patient Vitals for the past 8 hrs:   BP Temp Temp src Pulse Resp SpO2 01/07/23 2015 111/63 98.1 °F (36.7 °C) Oral 87 18 99 %   01/07/23 1738 113/67 98.1 °F (36.7 °C) Oral 86 18 95 %     1/3/23  Paracenthesis 1/3 2200ml yellow clear fluid  WBC 90 and cx pend - doubt infected    Had EGD 1/3 which shows no bleed, grade 1-2 varices distal esophagus and few AVMs and mild diffuse portal HTN    1/5/23  Increased shortness of breath, he is on room air at this point, abdomen distended,  Tips have been revisited on 12/30 1/6/23  Paracentesis today  Tomorrow will be last day of levaquin  He is still SOB  but on RA  Eating ok-     CURRENT EVALUATION : 1-7-23    Afebrile  VS stable    Patient feels better  No complaints  Comfortable on room air  No new issues per RN    Completing antibiotic treatment    Summary of relevant labs:  Labs:  Procalcitonin0.15 High      CRP31.8 High      ALT32U/L SCV623 High      BUN 6  Creatinine 0.51-->0.45  Na 135  K 3.5    WBC 5.2-->5.9  Hb 8.0  Plat 153    Micro:  Ascites  cx neg 12/28  Imaging:  CXR 1/3/23  Increasing vascular congestion with small pleural effusions, suggestive of   developing edema. Echo  Left ventricle is normal in size. Global left ventricular systolic function   is normal. Calculated ejection fraction 60% by Heart Model. No significant valvular regurgitation or stenosis seen. Moderate circumferential pericardial effusion seen. Measures 2.2cm   anteriorly and 2.26cm posteriorly. Clot-like structure noted in anterior pericardial space. No signs of tamponade. Consider clinical correlation. CTAP  1/3/23  There is a short segment of narrowing of the rectum near the rectosigmoid   junction compared to the recent exam and may represent spasm rather than   underlying mass. Fluid-filled loops of small bowel and colon suggesting   diarrhea with enteritis or colitis. Evaluation of true wall thickening is   limited by the diffuse edematous state. 2.  Cirrhotic liver with tips, ascites, and sequela of portal hypertension.      3. Thickening of the gastric mucosal folds and into the distal esophagus. Correlate with prior history of malignancy. 4.  Calcified thrombosed splenic artery aneurysm in the left upper quadrant   measuring up to 3.1 cm but stable from previous exams. 5.  Pleural effusions and pericardial effusion are redemonstrated. The   opacified portions of the lower lungs appears to be from compressive   atelectasis. I have personally reviewed the past medical history, past surgical history, medications, social history, and family history, and I haveupdated the database accordingly. Allergies:   Patient has no known allergies. Review of Systems:     Review of Systems   Constitutional:  Positive for activity change. Negative for diaphoresis and fatigue. HENT:  Negative for congestion and rhinorrhea. Eyes:  Negative for discharge and redness. Respiratory:  Positive for shortness of breath. Negative for apnea and cough. Cardiovascular:  Positive for leg swelling. Negative for chest pain. Gastrointestinal:  Positive for abdominal distention. Negative for abdominal pain. Endocrine: Negative for cold intolerance and polyphagia. Genitourinary:  Negative for difficulty urinating, hematuria and urgency. Musculoskeletal:  Negative for arthralgias. Skin:  Negative for color change. Allergic/Immunologic: Negative for immunocompromised state. Neurological:  Negative for dizziness, seizures and numbness. Hematological:  Negative for adenopathy. Psychiatric/Behavioral:  Negative for agitation and confusion. Physical Examination :       Physical Exam  Constitutional:       Appearance: Normal appearance. He is not ill-appearing. HENT:      Head: Normocephalic and atraumatic. Nose: Nose normal. No congestion or rhinorrhea. Mouth/Throat:      Mouth: Mucous membranes are moist.      Pharynx: No oropharyngeal exudate. Eyes:      General: No scleral icterus.      Conjunctiva/sclera: Conjunctivae normal.   Cardiovascular:      Rate and Rhythm: Normal rate and regular rhythm. Heart sounds: Normal heart sounds. No murmur heard. Pulmonary:      Breath sounds: No stridor. No rhonchi. Abdominal:      General: There is distension. Palpations: There is no mass. Tenderness: There is no abdominal tenderness. There is no guarding. Hernia: No hernia is present. Genitourinary:     Rectum: Guaiac result negative. Comments: Urine dark yellow  Musculoskeletal:         General: No swelling, tenderness, deformity or signs of injury. Cervical back: Neck supple. No rigidity. Skin:     Capillary Refill: Capillary refill takes less than 2 seconds. Coloration: Skin is not jaundiced. Findings: Bruising present. Neurological:      Mental Status: He is alert. Cranial Nerves: No cranial nerve deficit. Psychiatric:         Mood and Affect: Mood normal.         Thought Content:  Thought content normal.       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 Heidi Gamez, orthopedic, every 3-4 months, gets steroid injection    Lezama esophagus     BPH (benign prostatic hypertrophy)     Cholelithiasis     Cirrhosis ([address]     COVID-19 12/2020    pt reports he had a positive test while at [facility] in 2020, was asymptomatic    COVID-19 vaccine series completed 5/20/2021, 6/22/2021    Moderna 5/20/2021, 6/22/2021    DDD (degenerative disc disease), lumbar     Depression     Esophageal cancer ([address]     INVASIVE ADENOCARCINOMA ARISING IN TUBULAR ADENOMA WITH HIGH GRADE DYSPLASIA, ASSOCIATED WITH FOCAL INTESTINAL METAPLASIA     Esophageal varices ([address]     Fatty liver     GERD (gastroesophageal reflux disease)     GI bleed     Hep C w/o coma, chronic ([address]     History of alcohol abuse     6-12 beers a day; quit drinking 2019    History of blood transfusion     History of colon polyps 2016    History of tobacco abuse     [place] (hard of hearing)     Hyperlipidemia     Hypertension     Hyponatremia 07/20/2016    Hypotension 12/20/2021    Mastoid disorder, bilateral 12/31/2022    biLateral mastoid effusion    PONV (postoperative nausea and vomiting)     Port-A-Cath in place     right upper chest    Portal hypertension (HCC)     Sciatica     Secondary esophageal varices ([address] 06/07/2022    Shortness of breath     Spinal stenosis     Stomach ulcer     hx of    Thrombocytopenia ([address] 12/23/2020    Tubular adenoma of colon 2016, 2018    Vitamin D deficiency     Wears glasses        Past Surgical  History:     Past Surgical History:   Procedure Laterality Date    BUNIONECTOMY      twice on right side    BUNIONECTOMY Left     CARPAL TUNNEL RELEASE Right     COLONOSCOPY      at age 35    COLONOSCOPY  10/05/2016    polyps-pathology tubular adenoma, and abnormal looking mucosa right colon-pathology-tubular adenoma    COLONOSCOPY N/A 03/30/2018    COLONOSCOPY POLYPECTOMY COLD BIOPSY performed by Luis Cisneros MD at [address] [facility] [address],[facility] [address]  03/30/2018    Small polyp in the sigmoid colon and excised with biopsy forceps--tubular adenoma    COLONOSCOPY N/A 04/16/2022    COLONOSCOPY POLYPECTOMY performed by Luis Cisneros MD at [facility], DIAGNOSTIC      EGD    ESOPHAGOGASTRODUODENOSCOPY  12/29/2022    ESOPHAGOGASTRODUODENOSCOPY N/A 01/03/2023    IR PORT PLACEMENT EQUAL OR GREATER THAN 5 YEARS  04/19/2021    IR PORT PLACEMENT EQUAL OR GREATER THAN 5 YEARS 4/19/2021 [facility]    IR TIPS INSERTION  12/01/2022    IR TIPS INSERTION 12/1/2022 Sanaz Rankin MD [facility]    KNEE SURGERY Left     cyst removed    NASAL SEPTUM SURGERY      NERVE BLOCK Right 11/23/2020    NERVE BLOCK RIGHT CERVICAL STEROID INJECTION  C3-C6 performed by Danica Fung MD at [address] SURGICAL HISTORY  01/04/2016    steroid injection C7 T1    OTHER SURGICAL HISTORY  11/21/2016    Bilateral Lumbar CACHORRO L4-L5 injections    OTHER SURGICAL HISTORY  12/19/2016    lumbar steroid injection    OTHER SURGICAL HISTORY  09/28/2018    BILATERAL L5 CACHORRO (N/A Back)    OTHER SURGICAL HISTORY Right 11/23/2020    cervical injection    PAIN MANAGEMENT PROCEDURE Left 07/09/2020    EPIDURAL STEROID INJECTION LEFT L4 L5 performed by Ashley Merritt MD at [facility] Left 07/20/2020    LEFT L4 L5 EPIDURAL STEROID INJECTION performed by Ashley Merritt MD at [facility] Bilateral 08/17/2020    LUMBAR FACET BILATERAL L2-L5 performed by Ashley Merritt MD at [facility] Bilateral 12/07/2020    NERVE BLOCK BILATERAL LUMBAR MEDIAL BRANCH L2-L5 performed by Ashley Merritt MD at [address] [facility]    [place] NEUROPLASTY &/TRANSPOS MEDIAN NRV CARPAL TUNNE Right 08/29/2017    CARPAL TUNNEL RELEASE RIGHT performed by Jonathan Murray MD at [address] &/TRANSPOS MEDIAN NRV CARPAL TUNNE Left 10/31/2017    CARPAL TUNNEL RELEASE performed by Jonathan Murray MD at [facility] AA&/STRD TFRML EPI LUMBAR/SACRAL 1 LEVEL Bilateral 09/06/2018    BILATERAL L5 CACHORRO performed by Ashley Merritt MD at [facility] [address] AA&/STRD TFRML EPI LUMBAR/SACRAL 1 LEVEL N/A 09/28/2018    BILATERAL L5 CACHORRO performed by Ashley Merritt MD at [facility] 12/29/2020    EGD BIOPSY performed by Xavier Craig MD at [address] [place] [address] 02/02/2021    EGD BIOPSY and spot marking performed by Melani Carter MD at [address] [place] [address] 02/12/2021    ENDOSCOPIC ULTRASOUND, EGD performed by Angelique Shanks MD at [place] [address]  02/12/2021    EGD DIAGNOSTIC ONLY performed by Angelique Shanks MD at [place] [address] GASTROINTESTINAL ENDOSCOPY N/A 08/31/2021    EGD BIOPSY performed by Rocky Pierre MD at [address] [place] [address] 01/21/2022    EGD BIOPSY performed by Rocky Pierre MD at [address] [place] [address] N/A 04/15/2022    EGD ESOPHAGOGASTRODUODENOSCOPY performed by Rima Bolaños MD at [address] [place] [address] N/A 06/06/2022    EGD BAND LIGATION performed by Tiera Ornelas MD at [address] [place] [address] N/A 06/09/2022    EGD ESOPHAGOGASTRODUODENOSCOPY performed by Tiera Ornelas MD at [address] [place] [address] N/A 09/14/2022    EGD ESOPHAG[facility]GASTRODUODENOSCOPY [facility] performed by Virgen Leon MD at [address] [place] [address] N/A 10/19/2022    EGD BIOPSY performed by Rocky Pierre MD at [address] [place] [address] 10/31/2022    EGD with APC performed by Rocky Pierre MD at [address] [place] [address]  12/29/2022    EGD ESOPHAGOGASTRODUODENOSCOPY performed by Pop Singh MD at [address] [facility] 1/3/2023    EGD ESOPHAGOGASTRODUODENOSCOPY performed by Grady Berry MD at [address] [facility] [address]         Medications:      furosemide  20 mg Oral BID    lactulose  10 g Oral TID    pantoprazole  40 mg Oral QAM AC    levoFLOXacin  500 mg Oral Daily    insulin lispro  0.05 Units/kg SubCUTAneous TID WC    potassium chloride  10 mEq Oral BID    insulin lispro  0-4 Units SubCUTAneous TID WC    insulin lispro  0-4 Units SubCUTAneous Nightly    sodium chloride flush  5-40 mL IntraVENous 2 times per day    [Held by provider] baclofen  10 mg Oral TID    sodium chloride flush  5-40 mL IntraVENous 2 times per day       Social History:     Social History     Socioeconomic History    Marital status: Single     Spouse name: Not on file    Number of children: Not on file    Years of education: Not on file    Highest education level: Not on file   Occupational History    Not on file   Tobacco Use    Smoking status: Former     Packs/day: 1.00     Years: 45.00     Pack years: 45.00     Types: Cigarettes     Quit date: 2017     Years since quittin.9    Smokeless tobacco: Never   Vaping Use    Vaping Use: Never used   Substance and Sexual Activity    Alcohol use: Not Currently     Comment: Quit alcohol in 2019- heavier drinking prior to quitting    Drug use: Not Currently     Frequency: 1.0 times per week     Types: Cocaine     Comment: Cocaine- stopped spring 2016    Sexual activity: Yes     Partners: Female   Other Topics Concern    Not on file   Social History Narrative     in the past, retired     Social Determinants of Health     Financial Resource Strain: Low Risk     Difficulty of Paying Living Expenses: Not hard at all   Food Insecurity: No Food Insecurity    Worried About [employer] [employer] in the Last Year: Never true    [address] in the Last Year: Never true   Transportation Needs: Not on file   Physical Activity: Inactive    Days of Exercise per Week: 0 days    Minutes of Exercise per Session: 0 min   Stress: Not on file   Social Connections: Not on file   Intimate Partner Violence: Not on file   Housing Stability: Not on file       Family History:     Family History   Problem Relation Age of Onset    Cancer Mother         pancreatic    Cancer Father         bone    Diabetes Sister     Asthma Brother     Allergies Brother         MULTIPLE      Medical Decision Making:   I have independently reviewed/ordered the following labs:    CBC with Differential:   No results for input(s): WBC, HGB, HCT, PLT, SEGSPCT, BANDSPCT, LYMPHOPCT, MONOPCT, EOSPCT in the last 72 hours.     BMP:  Recent Labs     23  1919      K 3.5*   *   CO2 20   BUN 6*   CREATININE 0.45*   MG 1.3*       Hepatic Function Panel:   No results for input(s): PROT, LABALBU, BILIDIR, IBILI, BILITOT, ALKPHOS, ALT, AST in the last 72 hours. No results for input(s): RPR in the last 72 hours. No results for input(s): HIV in the last 72 hours. No results for input(s): BC in the last 72 hours. Lab Results   Component Value Date/Time    CREATININE 0.45 01/07/2023 07:19 PM    GLUCOSE 119 01/07/2023 07:19 PM    GLUCOSE 65 04/19/2012 11:30 AM       Detailed results: Thank you for allowing us to participate in the care of this patient. Please call with questions. This note is created with the assistance of a speech recognition program.  While intending to generate adocument that actually reflects the content of the visit, the document can still have some errors including those of syntax and sound a like substitutions which may escape proof reading. It such instances, actual meaningcan be extrapolated by contextual diversion.     Lexie Alan MD

## 2023-01-09 PROBLEM — R60.1 ANASARCA: Status: ACTIVE | Noted: 2023-01-09

## 2023-01-09 LAB
CULTURE: ABNORMAL
DIRECT EXAM: ABNORMAL
GLUCOSE BLD-MCNC: 108 MG/DL (ref 75–110)
GLUCOSE BLD-MCNC: 125 MG/DL (ref 75–110)
GLUCOSE BLD-MCNC: 132 MG/DL (ref 75–110)
GLUCOSE BLD-MCNC: 143 MG/DL (ref 75–110)
POTASSIUM SERPL-SCNC: 3 MMOL/L (ref 3.7–5.3)
SPECIMEN DESCRIPTION: ABNORMAL

## 2023-01-09 PROCEDURE — 6360000002 HC RX W HCPCS: Performed by: INTERNAL MEDICINE

## 2023-01-09 PROCEDURE — 36415 COLL VENOUS BLD VENIPUNCTURE: CPT

## 2023-01-09 PROCEDURE — 2580000003 HC RX 258: Performed by: NURSE PRACTITIONER

## 2023-01-09 PROCEDURE — 6370000000 HC RX 637 (ALT 250 FOR IP): Performed by: INTERNAL MEDICINE

## 2023-01-09 PROCEDURE — 99232 SBSQ HOSP IP/OBS MODERATE 35: CPT | Performed by: INTERNAL MEDICINE

## 2023-01-09 PROCEDURE — 1200000000 HC SEMI PRIVATE

## 2023-01-09 PROCEDURE — 84132 ASSAY OF SERUM POTASSIUM: CPT

## 2023-01-09 PROCEDURE — 82947 ASSAY GLUCOSE BLOOD QUANT: CPT

## 2023-01-09 PROCEDURE — 6370000000 HC RX 637 (ALT 250 FOR IP): Performed by: NURSE PRACTITIONER

## 2023-01-09 RX ORDER — MAGNESIUM SULFATE HEPTAHYDRATE 40 MG/ML
4000 INJECTION, SOLUTION INTRAVENOUS ONCE
Status: COMPLETED | OUTPATIENT
Start: 2023-01-09 | End: 2023-01-09

## 2023-01-09 RX ORDER — SPIRONOLACTONE 50 MG/1
50 TABLET, FILM COATED ORAL EVERY EVENING
Qty: 30 TABLET | Refills: 3 | OUTPATIENT
Start: 2023-01-09

## 2023-01-09 RX ORDER — FUROSEMIDE 40 MG/1
40 TABLET ORAL 2 TIMES DAILY
Status: DISCONTINUED | OUTPATIENT
Start: 2023-01-09 | End: 2023-01-11 | Stop reason: HOSPADM

## 2023-01-09 RX ORDER — POTASSIUM CHLORIDE 20 MEQ/1
40 TABLET, EXTENDED RELEASE ORAL ONCE
Status: COMPLETED | OUTPATIENT
Start: 2023-01-09 | End: 2023-01-09

## 2023-01-09 RX ADMIN — FUROSEMIDE 40 MG: 40 TABLET ORAL at 09:12

## 2023-01-09 RX ADMIN — SODIUM CHLORIDE, PRESERVATIVE FREE 10 ML: 5 INJECTION INTRAVENOUS at 09:21

## 2023-01-09 RX ADMIN — POTASSIUM CHLORIDE 10 MEQ: 1500 TABLET, EXTENDED RELEASE ORAL at 21:44

## 2023-01-09 RX ADMIN — SPIRONOLACTONE 25 MG: 25 TABLET ORAL at 09:12

## 2023-01-09 RX ADMIN — LACTULOSE 10 G: 20 SOLUTION ORAL at 09:13

## 2023-01-09 RX ADMIN — FUROSEMIDE 40 MG: 40 TABLET ORAL at 18:03

## 2023-01-09 RX ADMIN — SODIUM CHLORIDE, PRESERVATIVE FREE 10 ML: 5 INJECTION INTRAVENOUS at 21:45

## 2023-01-09 RX ADMIN — POTASSIUM CHLORIDE 40 MEQ: 1500 TABLET, EXTENDED RELEASE ORAL at 14:26

## 2023-01-09 RX ADMIN — ENOXAPARIN SODIUM 40 MG: 100 INJECTION SUBCUTANEOUS at 09:15

## 2023-01-09 RX ADMIN — MAGNESIUM SULFATE HEPTAHYDRATE 4000 MG: 40 INJECTION, SOLUTION INTRAVENOUS at 14:47

## 2023-01-09 RX ADMIN — POTASSIUM CHLORIDE 10 MEQ: 1500 TABLET, EXTENDED RELEASE ORAL at 09:12

## 2023-01-09 RX ADMIN — PANTOPRAZOLE SODIUM 40 MG: 40 TABLET, DELAYED RELEASE ORAL at 07:26

## 2023-01-09 ASSESSMENT — PAIN SCALES - GENERAL: PAINLEVEL_OUTOF10: 0

## 2023-01-09 NOTE — CARE COORDINATION
[address] to inquire about referral.    Hema Blackwood is asking on what the plan for Paracentesis will be. I informed her that they have added medications and he will need to follow up with GI find out if scheduled paracentesis will be needed. Awaiting return call from [facility] Spoke to patient regarding placement. I informed him that I have not gotten a definite acceptance from [facility] and that I need more choices as he is ready for discharge  He has reviewed the list and is asking for referrals to be sent  [facility] of [facility]- denied  [facility] and rehab  [address]- Accepted  Referrals sent    7847 Received a cll from Hema Blackwood at [facility]. She tells me that she can accept the patient and will start precert. She also tells me that she will need HENS as [facility] is requiring for precert. HENS completed and sent to [facility]    [phone] Patient is agreeable to [facility]. He has asked for me to call Nilam . Called Nilam left voicemail updating her on plan.   Left number for any question or concerns

## 2023-01-09 NOTE — TELEPHONE ENCOUNTER
Patient is seeing gastro for his ascites.  They prescribe this medication please route to his gastroenterologist.

## 2023-01-09 NOTE — PLAN OF CARE
Problem: Discharge Planning  Goal: Discharge to home or other facility with appropriate resources  1/9/2023 1832 by Justina Taylor RN  Outcome: Progressing  1/9/2023 0606 by Magda Valadez  Outcome: Progressing     Problem: Pain  Goal: Verbalizes/displays adequate comfort level or baseline comfort level  1/9/2023 1832 by Justina Taylor RN  Outcome: Progressing  1/9/2023 0606 by Magda Valadez  Outcome: Progressing     Problem: Respiratory - Adult  Goal: Achieves optimal ventilation and oxygenation  1/9/2023 1832 by Justina Taylor RN  Outcome: Progressing  1/9/2023 0606 by Magda Valadez  Outcome: Progressing     Problem: Cardiovascular - Adult  Goal: Maintains optimal cardiac output and hemodynamic stability  1/9/2023 1832 by Justina Taylor RN  Outcome: Progressing  1/9/2023 0606 by Magda Valadez  Outcome: Progressing  Goal: Absence of cardiac dysrhythmias or at baseline  1/9/2023 1832 by Justina Taylor RN  Outcome: Progressing  1/9/2023 0606 by Magda Valadez  Outcome: Progressing     Problem: Musculoskeletal - Adult  Goal: Return mobility to safest level of function  1/9/2023 1832 by Justina Taylor RN  Outcome: Progressing  1/9/2023 0606 by Magda Valadez  Outcome: Progressing  Goal: Maintain proper alignment of affected body part  1/9/2023 1832 by Justina Taylor RN  Outcome: Progressing  1/9/2023 0606 by Magda Valadez  Outcome: Progressing  Goal: Return ADL status to a safe level of function  1/9/2023 1832 by Justina Taylor RN  Outcome: Progressing  1/9/2023 0606 by Magda Valadez  Outcome: Progressing     Problem: Gastrointestinal - Adult  Goal: Minimal or absence of nausea and vomiting  1/9/2023 1832 by Justina Taylor RN  Outcome: Progressing  1/9/2023 0606 by Magda Valadez  Outcome: Progressing  Goal: Maintains or returns to baseline bowel function  1/9/2023 1832 by Justina Taylor RN  Outcome: Progressing  1/9/2023 0606 by Magda Valadez  Outcome: Progressing     Problem: Hematologic - Adult  Goal: Maintains hematologic stability  1/9/2023 1832 by Faustino Whitfield RN  Outcome: Progressing  1/9/2023 0606 by Soraya Ratliff  Outcome: Progressing     Problem: Skin/Tissue Integrity  Goal: Absence of new skin breakdown  Description: 1. Monitor for areas of redness and/or skin breakdown  2. Assess vascular access sites hourly  3. Every 4-6 hours minimum:  Change oxygen saturation probe site  4. Every 4-6 hours:  If on nasal continuous positive airway pressure, respiratory therapy assess nares and determine need for appliance change or resting period.   1/9/2023 1832 by Faustino Whitfield RN  Outcome: Progressing  1/9/2023 0606 by Soraya Ratliff  Outcome: Progressing     Problem: Safety - Adult  Goal: Free from fall injury  1/9/2023 1832 by Faustino Whitfield RN  Outcome: Progressing  1/9/2023 0606 by Soraya Ratliff  Outcome: Progressing     Problem: ABCDS Injury Assessment  Goal: Absence of physical injury  1/9/2023 1832 by Faustino Whitfield RN  Outcome: Progressing  1/9/2023 0606 by Soraya Ratliff  Outcome: Progressing     Problem: Nutrition Deficit:  Goal: Optimize nutritional status  1/9/2023 1832 by Faustino Whitfield RN  Outcome: Progressing  1/9/2023 0606 by Soraya Ratliff  Outcome: Progressing

## 2023-01-09 NOTE — PLAN OF CARE
Problem: Discharge Planning  Goal: Discharge to home or other facility with appropriate resources  Outcome: Progressing     Problem: Pain  Goal: Verbalizes/displays adequate comfort level or baseline comfort level  1/9/2023 0606 by Misty Bowman  Outcome: Progressing  1/8/2023 1707 by Darryl Haynes RN  Outcome: Progressing     Problem: Respiratory - Adult  Goal: Achieves optimal ventilation and oxygenation  Outcome: Progressing     Problem: Cardiovascular - Adult  Goal: Maintains optimal cardiac output and hemodynamic stability  Outcome: Progressing  Goal: Absence of cardiac dysrhythmias or at baseline  Outcome: Progressing     Problem: Musculoskeletal - Adult  Goal: Return mobility to safest level of function  Outcome: Progressing  Goal: Maintain proper alignment of affected body part  Outcome: Progressing  Goal: Return ADL status to a safe level of function  Outcome: Progressing     Problem: Gastrointestinal - Adult  Goal: Minimal or absence of nausea and vomiting  Outcome: Progressing  Goal: Maintains or returns to baseline bowel function  Outcome: Progressing     Problem: Hematologic - Adult  Goal: Maintains hematologic stability  Outcome: Progressing     Problem: Skin/Tissue Integrity  Goal: Absence of new skin breakdown  Description: 1. Monitor for areas of redness and/or skin breakdown  2. Assess vascular access sites hourly  3. Every 4-6 hours minimum:  Change oxygen saturation probe site  4. Every 4-6 hours:  If on nasal continuous positive airway pressure, respiratory therapy assess nares and determine need for appliance change or resting period.   Outcome: Progressing     Problem: Safety - Adult  Goal: Free from fall injury  Outcome: Progressing     Problem: ABCDS Injury Assessment  Goal: Absence of physical injury  Outcome: Progressing     Problem: Nutrition Deficit:  Goal: Optimize nutritional status  Outcome: Progressing

## 2023-01-09 NOTE — PROGRESS NOTES
.. PALLIATIVE CARE TEAM    Patient: Val Vargas  Room: 1919/1137-31    Reason For Consult   Goals of care evaluation  Distress management  Symptom Management  Guidance and support  Facilitate communications  Assistance in coordinating care  Recommendations for the above    Impression: Val Vargas is a 60y.o. year old male with  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 ([address] Alcoholic cirrhosis of liver with ascites ([address] Anemia, Anxiety, Arthritis, Back pain, chronic, Lezama esophagus, Bleeding gastric varices, BPH (benign prostatic hypertrophy), Cholelithiasis, Cirrhosis ([address] COVID-19, COVID-19 vaccine series completed, DDD (degenerative disc disease), lumbar, Depression, Esophageal cancer ([address] Esophageal varices ([address] Fatty liver, GERD (gastroesophageal reflux disease), GI bleed, Hep C w/o coma, chronic ([address] History of alcohol abuse, History of blood transfusion, History of colon polyps, History of tobacco abuse, [place] (hard of hearing), Hyperlipidemia, Hypertension, Hyponatremia, Hypotension, Mastoid disorder, bilateral, PONV (postoperative nausea and vomiting), Port-A-Cath in place, Portal hypertension ([address] Sciatica, Secondary esophageal varices ([address] Shortness of breath, Spinal stenosis, Stomach ulcer, Thrombocytopenia ([address] Tubular adenoma of colon, Vitamin D deficiency, and Wears glasses. .  Currently hospitalized for the management of shortness of breath . The Palliative Care Team is following to assist with goals of care and patient/family support. Code Status  Full Code  PLAN:   - patient is in bed and is alert and oriented and on room air  - he states\" I am all swollen everywhere. \"   - the plan is for rehab before the goal of returning home. - patient is a full code   -will follow for goals of care and support.    Vital Signs:  /74   Pulse 81   Temp 98.4 °F (36.9 °C) (Oral)   Resp 16   Ht 5' 10\" (1.778 m)   Wt 216 lb (98 kg)   SpO2 96%   BMI 30.99 kg/m² Patient Active Problem List   Diagnosis    Back pain, chronic    Hearing difficulty    GERD (gastroesophageal reflux disease)    Cervical radicular pain    Hypomagnesemia    Chronic viral hepatitis B without delta agent and without coma (HCC)    Calculus of gallbladder without cholecystitis    Hep C w/o coma, chronic (HCC)    Fatty liver    Psychophysiologic insomnia    Cirrhosis ([facility]    Decompensation of cirrhosis of liver (HCC)    Vertebrogenic low back pain    DDD (degenerative disc disease), lumbar    Depression    Tubular adenoma of colon    History of colon polyps    Gynecomastia, male    Lumbar radiculitis    Lumbar disc herniation    Tinnitus    Eustachian tube dysfunction    Ganglion cyst    Carpal tunnel syndrome of right wrist    History of hepatitis C    Vitamin D deficiency    Pure hypercholesterolemia    Acute hypokalemia    Essential hypertension    Recurrent major depressive disorder in partial remission (HCC)    S/P epidural steroid injection    Elevated LFTs    Seasonal allergies    Lumbar facet arthropathy    Cervical facet syndrome    Thrombocytopenia (HCC)    Hepatitis C virus infection resolved after antiviral drug therapy    Alcohol abuse    Altered mental status    Hypocalcemia    Hypophosphatemia    Malignant neoplasm of lower third of esophagus (HCC)    COVID-19    Anxiety    Current smoker    Severe comorbid illness    Gait instability    Abnormal findings on diagnostic imaging of spine    Cervical spinal stenosis    Spinal stenosis of lumbar region with neurogenic claudication    Low hemoglobin    Symptomatic anemia, microcytic, acute    Hypotension    Former smoker, 50+ pack years, quit 2016    HLD (hyperlipidemia)    Esophageal adenocarcinoma (HCC)    Anemia    Acute kidney injury ([address]    Ascites due to alcoholic cirrhosis (HCC)    Shortness of breath    Drop in hemoglobin    Esophageal polyp    Acute kidney failure, unspecified (HCC)    Muscle weakness (generalized)    Other abnormalities of gait and mobility    GI bleed    Goals of care, counseling/discussion    ACP (advance care planning)    Palliative care encounter    S/P TIPS (transjugular intrahepatic portosystemic shunt)    Lezama's esophagus with dysplasia    Mastoid disorder, bilateral    Acute deep vein thrombosis (DVT) of brachial vein of right upper extremity (HCC)    Chronic hepatitis C without hepatic coma (HCC)    Swelling of joint of upper arm, right    Anasarca       Palliative Interaction:Patient is in bed and he is on room air. I ask him how he feels and he states\" not great. \" He has edema is his legs and he states\" I am swollen all over. \"   I ask about rehab and he states\" yes I am going somewhere and he asks me. \" I assure him that they are finding a place for him to increase his strength so he can go home. I ask if there is anything else we can do to help support him and he states\" no I am good.'     I offer much emotional support. Will follow for goals of care and patient support.                Electronically signed by   Vonnie Gtz RN  Palliative Care Team  on 1/9/2023 at 3:10 PM

## 2023-01-09 NOTE — PROGRESS NOTES
[facility]  Office: [address], DO, Una Vivian, DO, Javier Settle, DO, Denia Deckerro Blood, DO, Robert Babcock MD, Johann Riedel, MD, Sammy Vines MD, Talisha Mattson MD,  Marlyn Landry MD, Kirstin Linton MD, Dominguez Driver, DO, Mine Pace MD,  Stoney Bhatia MD, Cooper Bhandari MD, Luna Kelly, DO, Rodrigo Mathias MD, Cal Jeffrey MD, Mikel Odom, DO, Chuckie Lagunas MD, Kassi Reyez MD, Mannie Jacobsen MD, Chayito James MD, Patricia Bautista, DO, Karla Lund MD, Oly Vo MD, Emilie Washington, Sissy Gonzalez, CNP, Jenn Sharma, CNP, Bonilla Valdovinos, CNP,  Jailene Mukherjee, [facility], Roberto Moura, CNP, Curt Dhillon, CNP, Nneka Floyd, CNP, Zeus Holt, CNP, Tomi Funes, CNP, Juanjose Monique PA-C, Rodrigo Barber, CNS, Jeri Morillo, CNP, Kristian Coyle, [address] [place] [address]    Progress Note    1/9/2023    8:59 AM    Name:   Fred Rosenberg  MRN:     [id number]     [place]:      [de-identified]   Room:   [address] [place] [address] Day:  12  Admit Date:  12/28/2022 11:30 PM    PCP:   VASU Doss  Code Status:  Full Code    Subjective:     C/C:  bleeding   Interval History Status: Not changed    Today pt feels like his swelling is significant. He feels it in his legs bilaterally as well as his scrotum. He is urinating with the lasix but I/O's have not been documented. Blood pressure is stable and patient is on room air. Other labs are stable. Brief History:     cirrhosis    Review of Systems:     Constitutional:  negative for chills, fevers, sweats feels good today +anasarca   Respiratory:  negative for cough, dyspnea on exertion, shortness of breath, wheezing  Cardiovascular:  negative for chest pain, chest pressure/discomfort, lower extremity edema, palpitations  Gastrointestinal:  negative for abdominal pain, constipation, diarrhea, nausea, vomiting +swelling in scrotum. Neurological:  negative for dizziness, headache  Ext: upper arm swelling worse on right than left. Also having swelling in legs bilaterally     Medications:      Allergies:  No Known Allergies    Current Meds:   Scheduled Meds:    spironolactone  25 mg Oral Daily    furosemide  40 mg Oral Daily    lactulose  10 g Oral BID    enoxaparin  40 mg SubCUTAneous Daily    pantoprazole  40 mg Oral QAM AC    insulin lispro  0.05 Units/kg SubCUTAneous TID WC    potassium chloride  10 mEq Oral BID    insulin lispro  0-4 Units SubCUTAneous TID WC    insulin lispro  0-4 Units SubCUTAneous Nightly    sodium chloride flush  5-40 mL IntraVENous 2 times per day    [Held by provider] baclofen  10 mg Oral TID    sodium chloride flush  5-40 mL IntraVENous 2 times per day     Continuous Infusions:    dextrose      dextrose      sodium chloride 20 mL/hr at 01/07/23 5984     PRN Meds: ipratropium-albuterol, potassium chloride **OR** potassium alternative oral replacement **OR** potassium chloride, magnesium sulfate, sodium phosphate IVPB **OR** sodium phosphate IVPB **OR** sodium phosphate IVPB, glucose, dextrose bolus **OR** dextrose bolus, glucagon (rDNA), dextrose, glucose, dextrose bolus **OR** dextrose bolus, glucagon (rDNA), dextrose, sodium chloride flush, sodium chloride, sodium chloride flush, ondansetron **OR** ondansetron    Data:     Past Medical History:   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 ([facility] Alcoholic cirrhosis of liver with ascites ([facility] Anemia, Anxiety, Arthritis, Back pain, chronic, Lezama esophagus, Bleeding gastric varices, BPH (benign prostatic hypertrophy), Cholelithiasis, Cirrhosis ([facility] COVID-19, COVID-19 vaccine series completed, DDD (degenerative disc disease), lumbar, Depression, Esophageal cancer ([facility] Esophageal varices ([facility] Fatty liver, GERD (gastroesophageal reflux disease), GI bleed, Hep C w/o coma, chronic ([facility] History of alcohol abuse, History of blood transfusion, History of colon polyps, History of tobacco abuse, [place] (hard of hearing), Hyperlipidemia, Hypertension, Hyponatremia, Hypotension, Mastoid disorder, bilateral, PONV (postoperative nausea and vomiting), Port-A-Cath in place, Portal hypertension ([address] Sciatica, Secondary esophageal varices ([address] Shortness of breath, Spinal stenosis, Stomach ulcer, Thrombocytopenia ([address] Tubular adenoma of colon, Vitamin D deficiency, and Wears glasses. Social History:   reports that he quit smoking about 5 years ago. His smoking use included cigarettes. He has a 45.00 pack-year smoking history. He has never used smokeless tobacco. He reports that he does not currently use alcohol. He reports that he does not currently use drugs after having used the following drugs: Cocaine. Frequency: 1.00 time per week. Family History:   Family History   Problem Relation Age of Onset    Cancer Mother         pancreatic    Cancer Father         bone    Diabetes Sister     Asthma Brother     Allergies Brother         MULTIPLE       Vitals:  BP (!) 103/54   Pulse 81   Temp 98.4 °F (36.9 °C) (Oral)   Resp 16   Ht 5' 10\" (1.778 m)   Wt 216 lb (98 kg)   SpO2 96%   BMI 30.99 kg/m²   Temp (24hrs), Av.5 °F (36.9 °C), Min:98.4 °F (36.9 °C), Max:98.6 °F (37 °C)    Recent Labs     23  1101 23  1616 23  2230 23  0840   POCGLU 104 112* 113* 108       I/O (24Hr):   No intake or output data in the 24 hours ending 23 0859      Labs:  Hematology:  Recent Labs     23  2336   WBC 5.9   RBC 3.17*   HGB 8.0*   HCT 27.7*   MCV 87.4   MCH 25.2   MCHC 28.9   RDW 20.7*      MPV 11.1     Chemistry:  Recent Labs     23  1919      K 3.5*   *   CO2 20   GLUCOSE 119*   BUN 6*   CREATININE 0.45*   MG 1.3*   ANIONGAP 7*   LABGLOM >60   CALCIUM 8.0*     Recent Labs     23  0631 23  1101 23  1616 23  2230 23  0840   POCGLU 114* 83 104 112* 113* 108     ABG:  Lab Results   Component Value Date/Time    FIO2 INFORMATION NOT PROVIDED 12/30/2022 01:35 AM     Lab Results   Component Value Date/Time    SPECIAL RT HAND 5ML 01/01/2023 02:36 PM     Lab Results   Component Value Date/Time    CULTURE NO GROWTH 5 DAYS 01/03/2023 11:20 AM       Radiology:  XR CHEST (SINGLE VIEW FRONTAL)    Result Date: 12/27/2022  1. Minimal bibasilar atelectatic changes with trace left pleural fluid which is slightly decreased from 12/09/2022. XR RADIUS ULNA RIGHT (2 VIEWS)    Result Date: 1/1/2023  No fracture or dislocation. Mild soft tissue swelling     CT HEAD WO CONTRAST    Result Date: 12/31/2022  CT BRAIN: 1. No evidence of acute hemorrhage, large territorial hypodensity, significant mass effect/midline shift, or ventriculomegaly 2. Involutional parenchymal changes. 3. Age-indeterminate (likely chronic) left basal ganglia lacunar infarct. If clinically indicated, can consider further evaluation with MRI if no contraindications. CT CERVICAL SPINE: 1. No acute abnormality of the cervical spine. 2. Multilevel cervical spondylosis, most notable at C5-C6 and C6-C7. No high-grade bony spinal canal stenosis. CT CERVICAL SPINE WO CONTRAST    Result Date: 12/31/2022  CT BRAIN: 1. No evidence of acute hemorrhage, large territorial hypodensity, significant mass effect/midline shift, or ventriculomegaly 2. Involutional parenchymal changes. 3. Age-indeterminate (likely chronic) left basal ganglia lacunar infarct. If clinically indicated, can consider further evaluation with MRI if no contraindications. CT CERVICAL SPINE: 1. No acute abnormality of the cervical spine. 2. Multilevel cervical spondylosis, most notable at C5-C6 and C6-C7. No high-grade bony spinal canal stenosis. CT ABDOMEN PELVIS W IV CONTRAST Additional Contrast? Radiologist Recommendation    Result Date: 1/3/2023  1.   There is a short segment of narrowing of the rectum near the rectosigmoid junction compared to the recent exam and may represent spasm rather than underlying mass. Fluid-filled loops of small bowel and colon suggesting diarrhea with enteritis or colitis. Evaluation of true wall thickening is limited by the diffuse edematous state. 2.  Cirrhotic liver with tips, ascites, and sequela of portal hypertension. 3.  Thickening of the gastric mucosal folds and into the distal esophagus. Correlate with prior history of malignancy. 4.  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 5.  Pleural effusions and pericardial effusion are redemonstrated. The opacified portions of the lower lungs appears to be from compressive atelectasis. CT ABDOMEN PELVIS W IV CONTRAST Additional Contrast? None    Result Date: 12/27/2022  Stable circumferential wall thickening of the distal esophagus and gastroesophageal junction consistent with known esophageal malignancy. No significant change since prior examination. No esophageal obstruction. Cirrhotic liver with associated portal venous hypertension, progressive ascites and stable splenomegaly. TIPS appears in place and patent Cholelithiasis Interval development of bibasal infiltrates and moderate bilateral pleural effusions as well as mild-to-moderate pericardial effusion     IR FLUORO GUIDED NEEDLE PLACEMENT    Result Date: 12/28/2022  Successful ultrasound-guided placement of a right upper extremity midline venous catheter. US LIVER    Result Date: 12/28/2022  1. Cholelithiasis without definite findings of acute cholecystitis. 2. Patent TIPS. Specific velocities recorded above. 3. Small volume abdominal ascites. 4. Hepatic morphologic features typical of cirrhosis. US GALLBLADDER RUQ    Result Date: 12/30/2022  Multiple cholelithiasis but no ultrasound evidence cholecystitis. Nondilated biliary tree. Cirrhosis. Patent TIPS. Ascites. Additional findings, as above.      XR CHEST PORTABLE    Result Date: 1/3/2023  Increasing vascular congestion with small pleural effusions, suggestive of developing edema. XR CHEST PORTABLE    Result Date: 1/2/2023  Mild pulmonary vascular congestion without evidence of overt edema     XR CHEST PORTABLE    Result Date: 12/31/2022  Little change from prior study. Cardiomegaly, vascular congestion, effusions, atelectasis and possible superimposed acute airspace disease are again noted. XR CHEST PORTABLE    Result Date: 12/30/2022  Cardiomegaly with probable mild central vascular congestion and similar bilateral pleural effusions with compressive atelectasis; filtrate at either base a consideration. No pulmonary edema. IR US GUIDED PARACENTESIS    Result Date: 12/28/2022  Successful ultrasound-guided therapeutic and diagnostic paracentesis. IR REVISION TIPS    Result Date: 1/2/2023  TIPS stent reassessment showing 6 mm Hg portosystemic shunt; no significant variceal filling demonstrated, but successful left gastric vein embolization performed. TIPS stent angioplasty to 10 mm with excellent flow demonstrated. The findings were discussed with Dr. Destiny Jackson post procedure. MRI BRAIN WO CONTRAST    Result Date: 1/2/2023  No evidence of acute ischemia. New moderate-sized bilateral mastoid effusions. Small old lacune within the left basal ganglia. New low T1 signal within the marrow elements diffusely likely representing a marrow replacement process such as anemia. Physical Examination:        General appearance:  alert, cooperative and no distress  Mental Status:  oriented to person, place and time and normal affect  Eyes: :+scleral icterus.    Lungs:  clear to auscultation bilaterally, normal effort  Heart:  regular rate and rhythm, no murmur  Abdomen:  soft, nontender, nondistended, normal bowel sounds, no masses, +hepatomegaly, +splenomegaly no fluid wave +scrotal edema  Extremities: +2 lower extremity edema bilaterally no redness, tenderness in the calves  Skin:  no gross lesions, rashes, induration     Assessment:        Hospital Problems             Last Modified POA    Decompensation of cirrhosis of liver ([facility] 1/8/2023 Yes    Ascites due to alcoholic cirrhosis ([facility] 2/7/8193 Yes    Shortness of breath 1/8/2023 Yes    Acute deep vein thrombosis (DVT) of brachial vein of right upper extremity ([address] 1/8/2023 Yes    Other abnormalities of gait and mobility 12/29/2022 Yes    S/P TIPS (transjugular intrahepatic portosystemic shunt) 12/29/2022 Yes    Lezama's esophagus with dysplasia 12/30/2022 Yes    Chronic hepatitis C without hepatic coma ([facility] 1/8/2023 Yes    Swelling of joint of upper arm, right 1/8/2023 Yes    Anasarca 1/9/2023 Yes    DDD (degenerative disc disease), lumbar 12/29/2022 Yes    Acute hypokalemia 1/8/2023 Yes    Mastoid disorder, bilateral 12/31/2022 Yes    Overview Signed 12/31/2022  4:50 PM by Madison Renner MD     biLateral mastoid effusion          Plan:        Decompensated cirrhosis with ascites, anasarca  Continue lasix 40 mg daily,  aldactone-Monitor strict I/O's, discussed with RN . If not having as much urine output will adjust dose of diuretic   Increase frequency of oral supplements. High protein. Encourage low sodium diet  Will need to see GI outpt to determine need/frequency of paracentesis. 2. Acute upper GI bleed due to decompensated alcoholic cirrhosis and gastric AVM-resolved   EGD showed large varices with moderate portal hypertensive gastropathy. 3.Multifocal pneumonia-resolved :  received Levaquin. Stop Azithromycin /rocephin that was started overnight. 4.Hepatic encephalopathy-resolved:   continue actulose titrate to 3 loose BM daily- will decrease dose since pt now having diarrhea  5. RUE brachial DVT- stable  Discussed with vascular surgery and GI team. Pt very high risk of bleed with anticoagulation. Given location, plan to monitor/treat conservatively. CTPE negative.    6.Acute hypokalemia  Replace potassium  Repeat labs    7. Debility: Needs PTOT   8. Pneumonia: Continue Levaquin until 1/7  9. PCM suggested: Dietary consulted, continue feeding.            Dispo: need PTOT evaluation and possible placement d/c order placed 1/4  Christine Levy, DO  1/9/2023  8:59 AM

## 2023-01-10 LAB
ANION GAP SERPL CALCULATED.3IONS-SCNC: 11 MMOL/L (ref 9–17)
BUN BLDV-MCNC: 7 MG/DL (ref 8–23)
CALCIUM SERPL-MCNC: 7.8 MG/DL (ref 8.6–10.4)
CHLORIDE BLD-SCNC: 103 MMOL/L (ref 98–107)
CO2: 23 MMOL/L (ref 20–31)
CREAT SERPL-MCNC: 0.5 MG/DL (ref 0.7–1.2)
GFR SERPL CREATININE-BSD FRML MDRD: >60 ML/MIN/1.73M2
GLUCOSE BLD-MCNC: 103 MG/DL (ref 70–99)
GLUCOSE BLD-MCNC: 117 MG/DL (ref 75–110)
GLUCOSE BLD-MCNC: 122 MG/DL (ref 75–110)
GLUCOSE BLD-MCNC: 135 MG/DL (ref 75–110)
GLUCOSE BLD-MCNC: 137 MG/DL (ref 75–110)
MAGNESIUM: 1.4 MG/DL (ref 1.6–2.6)
POTASSIUM SERPL-SCNC: 3.1 MMOL/L (ref 3.7–5.3)
POTASSIUM SERPL-SCNC: 3.4 MMOL/L (ref 3.7–5.3)
SODIUM BLD-SCNC: 137 MMOL/L (ref 135–144)

## 2023-01-10 PROCEDURE — 84132 ASSAY OF SERUM POTASSIUM: CPT

## 2023-01-10 PROCEDURE — 6370000000 HC RX 637 (ALT 250 FOR IP): Performed by: STUDENT IN AN ORGANIZED HEALTH CARE EDUCATION/TRAINING PROGRAM

## 2023-01-10 PROCEDURE — 2580000003 HC RX 258: Performed by: NURSE PRACTITIONER

## 2023-01-10 PROCEDURE — 83735 ASSAY OF MAGNESIUM: CPT

## 2023-01-10 PROCEDURE — 99232 SBSQ HOSP IP/OBS MODERATE 35: CPT | Performed by: STUDENT IN AN ORGANIZED HEALTH CARE EDUCATION/TRAINING PROGRAM

## 2023-01-10 PROCEDURE — 97116 GAIT TRAINING THERAPY: CPT

## 2023-01-10 PROCEDURE — 80048 BASIC METABOLIC PNL TOTAL CA: CPT

## 2023-01-10 PROCEDURE — 97110 THERAPEUTIC EXERCISES: CPT

## 2023-01-10 PROCEDURE — 82947 ASSAY GLUCOSE BLOOD QUANT: CPT

## 2023-01-10 PROCEDURE — 6360000002 HC RX W HCPCS: Performed by: INTERNAL MEDICINE

## 2023-01-10 PROCEDURE — 6360000002 HC RX W HCPCS: Performed by: STUDENT IN AN ORGANIZED HEALTH CARE EDUCATION/TRAINING PROGRAM

## 2023-01-10 PROCEDURE — 6370000000 HC RX 637 (ALT 250 FOR IP): Performed by: INTERNAL MEDICINE

## 2023-01-10 PROCEDURE — 1200000000 HC SEMI PRIVATE

## 2023-01-10 PROCEDURE — 36415 COLL VENOUS BLD VENIPUNCTURE: CPT

## 2023-01-10 PROCEDURE — 6370000000 HC RX 637 (ALT 250 FOR IP): Performed by: NURSE PRACTITIONER

## 2023-01-10 RX ORDER — POTASSIUM CHLORIDE 20 MEQ/1
40 TABLET, EXTENDED RELEASE ORAL 2 TIMES DAILY WITH MEALS
Status: DISCONTINUED | OUTPATIENT
Start: 2023-01-10 | End: 2023-01-10

## 2023-01-10 RX ORDER — MAGNESIUM SULFATE HEPTAHYDRATE 40 MG/ML
4000 INJECTION, SOLUTION INTRAVENOUS ONCE
Status: COMPLETED | OUTPATIENT
Start: 2023-01-10 | End: 2023-01-10

## 2023-01-10 RX ORDER — FUROSEMIDE 40 MG/1
40 TABLET ORAL 2 TIMES DAILY
Qty: 60 TABLET | Refills: 3 | Status: SHIPPED | OUTPATIENT
Start: 2023-01-10 | End: 2023-01-11 | Stop reason: SDUPTHER

## 2023-01-10 RX ORDER — SPIRONOLACTONE 25 MG/1
25 TABLET ORAL DAILY
Qty: 30 TABLET | Refills: 3 | Status: SHIPPED | OUTPATIENT
Start: 2023-01-11 | End: 2023-01-11 | Stop reason: SDUPTHER

## 2023-01-10 RX ORDER — POTASSIUM CHLORIDE 20 MEQ/1
40 TABLET, EXTENDED RELEASE ORAL ONCE
Status: COMPLETED | OUTPATIENT
Start: 2023-01-10 | End: 2023-01-10

## 2023-01-10 RX ADMIN — SODIUM CHLORIDE, PRESERVATIVE FREE 10 ML: 5 INJECTION INTRAVENOUS at 20:59

## 2023-01-10 RX ADMIN — ENOXAPARIN SODIUM 40 MG: 100 INJECTION SUBCUTANEOUS at 09:38

## 2023-01-10 RX ADMIN — POTASSIUM CHLORIDE 10 MEQ: 1500 TABLET, EXTENDED RELEASE ORAL at 09:38

## 2023-01-10 RX ADMIN — LACTULOSE 10 G: 20 SOLUTION ORAL at 09:38

## 2023-01-10 RX ADMIN — SPIRONOLACTONE 25 MG: 25 TABLET ORAL at 09:38

## 2023-01-10 RX ADMIN — SODIUM CHLORIDE, PRESERVATIVE FREE 10 ML: 5 INJECTION INTRAVENOUS at 09:48

## 2023-01-10 RX ADMIN — SODIUM CHLORIDE, PRESERVATIVE FREE 10 ML: 5 INJECTION INTRAVENOUS at 20:58

## 2023-01-10 RX ADMIN — FUROSEMIDE 40 MG: 40 TABLET ORAL at 09:38

## 2023-01-10 RX ADMIN — POTASSIUM CHLORIDE 40 MEQ: 1500 TABLET, EXTENDED RELEASE ORAL at 17:44

## 2023-01-10 RX ADMIN — POTASSIUM BICARBONATE 20 MEQ: 782 TABLET, EFFERVESCENT ORAL at 14:35

## 2023-01-10 RX ADMIN — MAGNESIUM SULFATE HEPTAHYDRATE 4000 MG: 40 INJECTION, SOLUTION INTRAVENOUS at 14:34

## 2023-01-10 RX ADMIN — POTASSIUM CHLORIDE 40 MEQ: 1500 TABLET, EXTENDED RELEASE ORAL at 14:35

## 2023-01-10 RX ADMIN — PANTOPRAZOLE SODIUM 40 MG: 40 TABLET, DELAYED RELEASE ORAL at 09:38

## 2023-01-10 RX ADMIN — SODIUM CHLORIDE, PRESERVATIVE FREE 10 ML: 5 INJECTION INTRAVENOUS at 09:38

## 2023-01-10 ASSESSMENT — PAIN SCALES - GENERAL
PAINLEVEL_OUTOF10: 0
PAINLEVEL_OUTOF10: 0

## 2023-01-10 NOTE — CARE COORDINATION
Transitional Planning  Spoke with PT, states patient is safe to return home and does not need SNF. Spoke with patient, patient agreeable home with [facility]. Called [facility] at [phone], message left for [facility] regarding home with [address] [place] [address] home care.

## 2023-01-10 NOTE — PLAN OF CARE
Problem: Discharge Planning  Goal: Discharge to home or other facility with appropriate resources  1/10/2023 0114 by Edmund Garcia RN  Outcome: Progressing  1/9/2023 1832 by Diane Obando RN  Outcome: Progressing     Problem: Pain  Goal: Verbalizes/displays adequate comfort level or baseline comfort level  1/10/2023 0114 by Edmund Garcia RN  Outcome: Progressing  1/9/2023 1832 by Diane Obando RN  Outcome: Progressing     Problem: Respiratory - Adult  Goal: Achieves optimal ventilation and oxygenation  1/10/2023 0114 by Edmund Garcia RN  Outcome: Progressing  1/9/2023 1832 by Diane Obando RN  Outcome: Progressing     Problem: Cardiovascular - Adult  Goal: Maintains optimal cardiac output and hemodynamic stability  1/10/2023 0114 by Edmund Garcia RN  Outcome: Progressing  1/9/2023 1832 by Diane Obando RN  Outcome: Progressing  Goal: Absence of cardiac dysrhythmias or at baseline  1/10/2023 0114 by Edmund Garcia RN  Outcome: Progressing  1/9/2023 1832 by Diane Obando RN  Outcome: Progressing     Problem: Cardiovascular - Adult  Goal: Absence of cardiac dysrhythmias or at baseline  1/10/2023 0114 by Edmund Garica RN  Outcome: Progressing  1/9/2023 1832 by Diane Obando RN  Outcome: Progressing

## 2023-01-10 NOTE — PROGRESS NOTES
[facility]  Office: [address], DO, Prema Todd, DO, Sacha Key, DO, Katty Contreras Blood, DO, Scot Omalley MD, Lang Espino MD, Laurie Ayon MD, Manny Garcia MD,  Aaron Kaba MD, Chacorta Pena MD, Lyndsey Howard, DO, Scarlett Horowitz MD,  Kishan Abdullahi MD, Lotus Floyd MD, Summer Louie, DO, Lyssa Min MD, Shelley Quinn MD, Servando Dumont, DO, Hua Walton MD, Arabella Quevedo MD, Holden Brown MD, Lashae Linton MD, Allie Romano, DO, Thom Monet MD, Ginette Berumen MD, Osiel Ojeda, CNP,  Sean Barnard, CNP, Maribel Morales, CNP, Grady Hill, CNP,  Flavia Noble, [facility], Glo Yoder, CNP, Lawanda Jaimes, CNP, Marii Mcarthur, CNP, Ab Bernard, CNP, Garry Camarena, CNP, Jessica Burgos PA-C, Teresa Chau, CNS, Lukasz Montague, CNP, Pilo June, CNP         [address]    Progress Note    1/10/2023    8:16 AM    Name:   Kyra Marie  MRN:     [id number]     [place]:      [de-identified]   Room:   [address] [place] [address] Day:  13  Admit Date:  12/28/2022 11:30 PM    PCP:   VASU Hurley  Code Status:  Full Code    Subjective:     C/C: Abdominal pain. Interval History Status: improved. Vitals reviewed, afebrile and hemodynamically stable. Saturating well on room air. Labs reviewed, hypokalemia 3.1 and hypomagnesemia 1.4. Overnight patient had no significant events. On examination patient sitting comfortably in bed. States his swelling has decreased in his upper extremities and lower extremities persist.  Discussed JEOVANY stockings as well as Ace wrap however patient refuses. Brief History:     Mr. Ijeoma Hayward is a 74-year-old male with a past medical history of esophageal cancer, hepatic encephalopathy, EtOH history with decompensated liver failure status post TIPS who initially presented with the chief complaint of abdominal pain on 12/28/2022.   He was found to be anemic with extensive ascites. Review of Systems:     Constitutional: Positive for generalized anasarca. Negative for chills, fevers, sweats  Respiratory:  negative for cough, dyspnea on exertion, shortness of breath, wheezing  Cardiovascular: Positive for lower extremity and scrotal edema. Negative for chest pain, chest pressure/discomfort, palpitations  Gastrointestinal:  negative for abdominal pain, constipation, diarrhea, nausea, vomiting  Neurological:  negative for dizziness, headache    Medications:      Allergies:  No Known Allergies    Current Meds:   Scheduled Meds:    furosemide  40 mg Oral BID    spironolactone  25 mg Oral Daily    lactulose  10 g Oral BID    enoxaparin  40 mg SubCUTAneous Daily    pantoprazole  40 mg Oral QAM AC    insulin lispro  0.05 Units/kg SubCUTAneous TID WC    potassium chloride  10 mEq Oral BID    insulin lispro  0-4 Units SubCUTAneous TID WC    insulin lispro  0-4 Units SubCUTAneous Nightly    sodium chloride flush  5-40 mL IntraVENous 2 times per day    [Held by provider] baclofen  10 mg Oral TID    sodium chloride flush  5-40 mL IntraVENous 2 times per day     Continuous Infusions:    dextrose      dextrose      sodium chloride Stopped (01/09/23 6327)     PRN Meds: ipratropium-albuterol, potassium chloride **OR** potassium alternative oral replacement **OR** potassium chloride, magnesium sulfate, sodium phosphate IVPB **OR** sodium phosphate IVPB **OR** sodium phosphate IVPB, glucose, dextrose bolus **OR** dextrose bolus, glucagon (rDNA), dextrose, glucose, dextrose bolus **OR** dextrose bolus, glucagon (rDNA), dextrose, sodium chloride flush, sodium chloride, sodium chloride flush, ondansetron **OR** ondansetron    Data:     Past Medical History:   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 ([facility] Alcoholic cirrhosis of liver with ascites ([facility] Anemia, Anxiety, Arthritis, Back pain, chronic, Lezama esophagus, Bleeding gastric varices, BPH (benign prostatic hypertrophy), Cholelithiasis, Cirrhosis ([address] COVID-19, COVID-19 vaccine series completed, DDD (degenerative disc disease), lumbar, Depression, Esophageal cancer ([address] Esophageal varices ([address] Fatty liver, GERD (gastroesophageal reflux disease), GI bleed, Hep C w/o coma, chronic ([address] History of alcohol abuse, History of blood transfusion, History of colon polyps, History of tobacco abuse, [place] (hard of hearing), Hyperlipidemia, Hypertension, Hyponatremia, Hypotension, Mastoid disorder, bilateral, PONV (postoperative nausea and vomiting), Port-A-Cath in place, Portal hypertension ([address] Sciatica, Secondary esophageal varices ([address] Shortness of breath, Spinal stenosis, Stomach ulcer, Thrombocytopenia ([address] Tubular adenoma of colon, Vitamin D deficiency, and Wears glasses. Social History:   reports that he quit smoking about 5 years ago. His smoking use included cigarettes. He has a 45.00 pack-year smoking history. He has never used smokeless tobacco. He reports that he does not currently use alcohol. He reports that he does not currently use drugs after having used the following drugs: Cocaine. Frequency: 1.00 time per week. Family History:   Family History   Problem Relation Age of Onset    Cancer Mother         pancreatic    Cancer Father         bone    Diabetes Sister     Asthma Brother     Allergies Brother         MULTIPLE       Vitals:  BP (!) 106/54   Pulse 94   Temp 98.1 °F (36.7 °C) (Oral)   Resp 18   Ht 5' 10\" (1.778 m)   Wt 216 lb (98 kg)   SpO2 98%   BMI 30.99 kg/m²   Temp (24hrs), Av.1 °F (36.7 °C), Min:97.7 °F (36.5 °C), Max:98.4 °F (36.9 °C)    Recent Labs     23  1149 23  1644 01/09/23  2047 01/10/23  0759   POCGLU 125* 143* 132* 117*       I/O (24Hr):     Intake/Output Summary (Last 24 hours) at 1/10/2023 0816  Last data filed at 2023 1828  Gross per 24 hour   Intake 1926.6 ml   Output 800 ml   Net 1126.6 ml       Labs:  Hematology:  Recent Labs 01/07/23  2336   WBC 5.9   RBC 3.17*   HGB 8.0*   HCT 27.7*   MCV 87.4   MCH 25.2   MCHC 28.9   RDW 20.7*      MPV 11.1     Chemistry:  Recent Labs     01/07/23  1919 01/09/23  1145     --    K 3.5* 3.0*   *  --    CO2 20  --    GLUCOSE 119*  --    BUN 6*  --    CREATININE 0.45*  --    MG 1.3*  --    ANIONGAP 7*  --    LABGLOM >60  --    CALCIUM 8.0*  --      Recent Labs     01/08/23  2230 01/09/23  0840 01/09/23  1149 01/09/23  1644 01/09/23  2047 01/10/23  0759   POCGLU 113* 108 125* 143* 132* 117*     ABG:  Lab Results   Component Value Date/Time    FIO2 INFORMATION NOT PROVIDED 12/30/2022 01:35 AM     Lab Results   Component Value Date/Time    SPECIAL RT HAND 5ML 01/01/2023 02:36 PM     Lab Results   Component Value Date/Time    CULTURE NO GROWTH 6 DAYS 01/03/2023 11:20 AM       Radiology:  CT CHEST PULMONARY EMBOLISM W CONTRAST    Result Date: 1/7/2023  No evidence of pulmonary embolism. Moderate bilateral pleural effusion is associated with atelectatic changes of right lower lobe and lingula and left lower lobe. . Focal consolidative change within the anterior aspect of right upper lobe peripheral aspect of left upper lobe and medial aspect of right middle lobe are likely suggestive of multifocal pneumonia. Moderate pericardial effusion. Cirrhotic liver with TIPS, ascites and portal hypertension . Calcified thrombosed splenic artery measuring 3.1 cm. . RECOMMENDATIONS: Unavailable     IR US GUIDED PARACENTESIS    Result Date: 1/3/2023  Successful ultrasound-guided therapeutic paracentesis. US GUIDED PARACENTESIS    Result Date: 1/6/2023  Successful ultrasound-guided paracentesis.        Physical Examination:        General appearance:  alert, cooperative and no distress  Mental Status:  oriented to person, place and time and normal affect  Lungs:  clear to auscultation bilaterally, normal effort  Heart:  regular rate and rhythm, no murmur  Abdomen:  soft, nontender, nondistended, normal bowel sounds, no masses, hepatomegaly, splenomegaly  Extremities: 2+ pitting bilateral lower extremity edema, no redness, no tenderness in the calves, there is weeping from right upper extremity wound as well as some swelling to right upper extremity with known brachial DVT. Skin:  no gross lesions, rashes, induration    Assessment:        Hospital Problems             Last Modified POA    Ascites due to alcoholic cirrhosis ([address] 1/2/6152 Yes    Shortness of breath 1/8/2023 Yes    Other abnormalities of gait and mobility 12/29/2022 Yes    S/P TIPS (transjugular intrahepatic portosystemic shunt) 12/29/2022 Yes    Lezama's esophagus with dysplasia 12/30/2022 Yes    Acute deep vein thrombosis (DVT) of brachial vein of right upper extremity ([address] 1/8/2023 Yes    Chronic hepatitis C without hepatic coma ([address] 1/8/2023 Yes    Swelling of joint of upper arm, right 1/8/2023 Yes    Anasarca 1/9/2023 Yes    Decompensation of cirrhosis of liver ([address] 1/8/2023 Yes    DDD (degenerative disc disease), lumbar 12/29/2022 Yes    Acute hypokalemia 1/8/2023 Yes    Mastoid disorder, bilateral 12/31/2022 Yes    Overview Signed 12/31/2022  4:50 PM by Hany Leija MD     biLateral mastoid effusion            Plan:        Decompensated liver cirrhosis with ascites and anasarca. Status post TIPS revision 12/30/2022. Continue Lasix 40 mg daily and Aldactone 25 mg daily as well as lactulose with titration to 3-4 bowel movements daily. Continue high-protein oral supplementation and low-sodium diet. Patient will need GI outpatient follow-up for diuretic adjustment as well as frequent paracentesis. Acute upper GI bleed secondary to decompensated alcoholic cirrhosis and gastric AVM. Resolved. EGD demonstrated large varices with moderate portal hypertensive gastropathy. Continue Protonix 40 mg daily. Multifocal pneumonia. Resolved status post Levaquin. Hepatic encephalopathy. Resolved.   Continue lactulose with titration to 3-4 bowel movements daily    Right upper extremity brachial DVT. Vascular surgery consulted and discussion between vascular surgery and GI as well as primary. Patient is very high risk for bleeding with anticoagulation. Given location there is plan to treat conservatively. Acute hypokalemia. Magnesium 1.4.  4 g of magnesium replaced. 60 mEq of potassium given. We will repeat potassium at 1600 today. Debility. Has resolved with PT OT    DVT prophylaxis: Lovenox 40 mg daily. GI prophylaxis: Protonix 40 mg daily. Discharge planning: Plan to discharge patient home today as he has worked significantly better with PT/OT. However, patient still remains hypokalemic. We will replace magnesium and potassium today with repeat BMP at 1600. If potassium has normalized and magnesium stabilized will discharge home.     Josue Giles DO  1/10/2023  8:16 AM

## 2023-01-10 NOTE — PROGRESS NOTES
.. PALLIATIVE CARE TEAM    Patient: Padmini Gilbert  Room: [id number]    Reason For Consult   Goals of care evaluation  Distress management  Symptom Management  Guidance and support  Facilitate communications  Assistance in coordinating care  Recommendations for the above    Impression: Padmini Gilbert is a 60y.o. year old male with  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 ([address] Alcoholic cirrhosis of liver with ascites ([address] Anemia, Anxiety, Arthritis, Back pain, chronic, Lezama esophagus, Bleeding gastric varices, BPH (benign prostatic hypertrophy), Cholelithiasis, Cirrhosis ([address] COVID-19, COVID-19 vaccine series completed, DDD (degenerative disc disease), lumbar, Depression, Esophageal cancer ([address] Esophageal varices ([address] Fatty liver, GERD (gastroesophageal reflux disease), GI bleed, Hep C w/o coma, chronic ([address] History of alcohol abuse, History of blood transfusion, History of colon polyps, History of tobacco abuse, [place] (hard of hearing), Hyperlipidemia, Hypertension, Hyponatremia, Hypotension, Mastoid disorder, bilateral, PONV (postoperative nausea and vomiting), Port-A-Cath in place, Portal hypertension ([address] Sciatica, Secondary esophageal varices ([address] Shortness of breath, Spinal stenosis, Stomach ulcer, Thrombocytopenia ([address] Tubular adenoma of colon, Vitamin D deficiency, and Wears glasses. .  Currently hospitalized for the management of Shortness of breath. The Palliative Care Team is following to assist with goals of care and support.      Code Status  Full Code  PLAN:   - the patient is alert and oriented and he is on room air   - he as able to walk the halls and does not need SNF and will go home with [address] [place] [address] home care  - patient will follow up at the [address] [place] [address] on Feb 1st at 11am  - patient is a full code    Vital Signs:  BP (!) 106/54   Pulse 94   Temp 98.1 °F (36.7 °C) (Oral)   Resp 18   Ht 5' 10\" (1.778 m)   Wt 210 lb 8.6 oz (95.5 kg)   SpO2 98%   BMI 30.21 kg/m²     Patient Active Problem List   Diagnosis    Back pain, chronic    Hearing difficulty    GERD (gastroesophageal reflux disease)    Cervical radicular pain    Hypomagnesemia    Chronic viral hepatitis B without delta agent and without coma (HCC)    Calculus of gallbladder without cholecystitis    Hep C w/o coma, chronic (HCC)    Fatty liver    Psychophysiologic insomnia    Cirrhosis ([address]    Decompensation of cirrhosis of liver (HCC)    Vertebrogenic low back pain    DDD (degenerative disc disease), lumbar    Depression    Tubular adenoma of colon    History of colon polyps    Gynecomastia, male    Lumbar radiculitis    Lumbar disc herniation    Tinnitus    Eustachian tube dysfunction    Ganglion cyst    Carpal tunnel syndrome of right wrist    History of hepatitis C    Vitamin D deficiency    Pure hypercholesterolemia    Acute hypokalemia    Essential hypertension    Recurrent major depressive disorder in partial remission (HCC)    S/P epidural steroid injection    Elevated LFTs    Seasonal allergies    Lumbar facet arthropathy    Cervical facet syndrome    Thrombocytopenia (HCC)    Hepatitis C virus infection resolved after antiviral drug therapy    Alcohol abuse    Altered mental status    Hypocalcemia    Hypophosphatemia    Malignant neoplasm of lower third of esophagus (HCC)    COVID-19    Anxiety    Current smoker    Severe comorbid illness    Gait instability    Abnormal findings on diagnostic imaging of spine    Cervical spinal stenosis    Spinal stenosis of lumbar region with neurogenic claudication    Low hemoglobin    Symptomatic anemia, microcytic, acute    Hypotension    Former smoker, 50+ pack years, quit 2016    HLD (hyperlipidemia)    Esophageal adenocarcinoma ([address]    Anemia    Acute kidney injury ([address]    Ascites due to alcoholic cirrhosis (HCC)    Shortness of breath    Drop in hemoglobin    Esophageal polyp    Acute kidney failure, unspecified (HCC)    Muscle weakness (generalized)    Other abnormalities of gait and mobility    GI bleed    Goals of care, counseling/discussion    ACP (advance care planning)    Palliative care encounter    S/P TIPS (transjugular intrahepatic portosystemic shunt)    Lezama's esophagus with dysplasia    Mastoid disorder, bilateral    Acute deep vein thrombosis (DVT) of brachial vein of right upper extremity (HCC)    Chronic hepatitis C without hepatic coma (HCC)    Swelling of joint of upper arm, right    Anasarca       Palliative Interaction:Patient is in bed and he is on room air. He is alert and talkative. He states that he is feeling better, though he has a lot of swelling in his legs. He states that he was up in the hallways and now he is able to go home from here. He mentioned that he will not need PT and just home care. He has a palliative care clinic referral and an appointment on Feb 1st at 11am at the [address] [place] [address]. His ex-wife is his HCPOA, and she as well takes him to and monitors his appointments. He will return home with [facility]. I offer much emotional to him and he is very appreciative. Will follow for goals of care and patient/family support.        Electronically signed by   Tino Ch RN  Palliative Care Team  on 1/10/2023 at 1:53 PM

## 2023-01-10 NOTE — DISCHARGE INSTR - DIET

## 2023-01-10 NOTE — PROGRESS NOTES
Physical Therapy  Facility/Department: [facility] RENAL//MED SURG  Physical Therapy Daily Treatment Note    Name: Daniela Bernal  : 1960  MRN: [id number]  Date of Service: 1/10/2023    Discharge Recommendations:  Patient would benefit from continued therapy after discharge   PT Equipment Recommendations  Equipment Needed: No  Other: pt reports he owns RW, recommend use at all times      Patient Diagnosis(es): The encounter diagnosis was Decompensation of cirrhosis of liver ([facility] [address] 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 ([address] Alcoholic cirrhosis of liver with ascites ([address] Anemia, Anxiety, Arthritis, Back pain, chronic, Lezama esophagus, Bleeding gastric varices, BPH (benign prostatic hypertrophy), Cholelithiasis, Cirrhosis ([address] COVID-19, COVID-19 vaccine series completed, DDD (degenerative disc disease), lumbar, Depression, Esophageal cancer ([address] Esophageal varices ([address] Fatty liver, GERD (gastroesophageal reflux disease), GI bleed, Hep C w/o coma, chronic ([address] History of alcohol abuse, History of blood transfusion, History of colon polyps, History of tobacco abuse, [place] (hard of hearing), Hyperlipidemia, Hypertension, Hyponatremia, Hypotension, Mastoid disorder, bilateral, PONV (postoperative nausea and vomiting), Port-A-Cath in place, Portal hypertension ([address] Sciatica, Secondary esophageal varices ([address] Shortness of breath, Spinal stenosis, Stomach ulcer, Thrombocytopenia ([address] Tubular adenoma of colon, Vitamin D deficiency, and Wears glasses. Past Surgical History:  has a past surgical history that includes Bunionectomy; Nasal septum surgery; other surgical history (2016); Colonoscopy; Colonoscopy (10/05/2016); other surgical history (2016); other surgical history (2016); knee surgery (Left); Bunionectomy (Left); Endoscopy, colon, diagnostic; pr neuroplasty &/transpos median nrv carpal tunne (Right, 2017);  Carpal tunnel release (Right); pr neuroplasty &/transpos median nrv carpal tunne (Left, 10/31/2017); Colonoscopy (N/A, 03/30/2018); Colonoscopy (03/30/2018); pr njx aa&/strd tfrml epi lumbar/sacral 1 level (Bilateral, 09/06/2018); other surgical history (09/28/2018); pr njx aa&/strd tfrml epi lumbar/sacral 1 level (N/A, 09/28/2018); Pain management procedure (Left, 07/09/2020); Pain management procedure (Left, 07/20/2020); Pain management procedure (Bilateral, 08/17/2020); other surgical history (Right, 11/23/2020); Nerve Block (Right, 11/23/2020); Pain management procedure (Bilateral, 12/07/2020); Upper gastrointestinal endoscopy (N/A, 12/29/2020); Upper gastrointestinal endoscopy (N/A, 02/02/2021); Upper gastrointestinal endoscopy (N/A, 02/12/2021); Upper gastrointestinal endoscopy (02/12/2021); [place] tooth extraction; IR PORT PLACEMENT > 5 YEARS (04/19/2021); Upper gastrointestinal endoscopy (N/A, 08/31/2021); Upper gastrointestinal endoscopy (N/A, 01/21/2022); Upper gastrointestinal endoscopy (N/A, 04/15/2022); Colonoscopy (N/A, 04/16/2022); Upper gastrointestinal endoscopy (N/A, 06/06/2022); Upper gastrointestinal endoscopy (N/A, 06/09/2022); Paracentesis; Upper gastrointestinal endoscopy (N/A, 09/14/2022); Upper gastrointestinal endoscopy (N/A, 10/19/2022); Upper gastrointestinal endoscopy (N/A, 10/31/2022); IR TIPS INSERTION (12/01/2022); Esophagogastroduodenoscopy (12/29/2022); Upper gastrointestinal endoscopy (12/29/2022); Esophagogastroduodenoscopy (N/A, 01/03/2023); and Upper gastrointestinal endoscopy (N/A, 1/3/2023). Assessment   Body Structures, Functions, Activity Limitations Requiring Skilled Therapeutic Intervention: Decreased functional mobility ; Decreased strength; Increased pain;Decreased posture;Decreased cognition;Decreased ROM; Decreased endurance;Decreased balance  Assessment: Pt demonstrates significantly improving functional mobility this date, able to ambulate 250 feet with a RW and SBA with good stability. Pt most limited this date secondary to decreased endurance. Pt would benefit from additional high level PT upon discharge to address deficits. Pt would benefit from some assistance with mobility upon discharge, pt reports supportive ex-wife is able to provide assistance. Therapy Prognosis: Good  Decision Making: Medium Complexity  Requires PT Follow-Up: Yes  Activity Tolerance  Activity Tolerance: Patient tolerated treatment well     Plan   Physcial Therapy Plan  General Plan:  (5x/week)  Current Treatment Recommendations: Strengthening, ROM, Balance training, Functional mobility training, Transfer training, Endurance training, Gait training, Stair training, Safety education & training, Patient/Caregiver education & training, Equipment evaluation, education, & procurement, Therapeutic activities, Home exercise program  Safety Devices  Type of Devices: Nurse notified, Call light within reach, Gait belt, Left in bed  Restraints  Restraints Initially in Place: No     Restrictions  Restrictions/Precautions  Restrictions/Precautions: Fall Risk, Up as Tolerated  Required Braces or Orthoses?: No  Position Activity Restriction  Other position/activity restrictions: s/p TIPS revision 12/30/22     Subjective   General  Patient assessed for rehabilitation services?: Yes  Response To Previous Treatment: Patient with no complaints from previous session. Family / Caregiver Present: No  Follows Commands: Within Functional Limits  Subjective  Subjective: Pt seated at the EOB and agreeable to therapy, RN agreeable to therapy. Pt pleasant and cooperative throughout.   Pt denies pain at rest.         Social/Functional History  Social/Functional History  Lives With: Alone  Type of Home: House  Home Layout: One level  Home Access: Stairs to enter without rails  Entrance Stairs - Number of Steps: 3  Bathroom Shower/Tub: Tub/Shower unit  Bathroom Toilet: Standard  Home Equipment: Rollator, Cane  Receives Help From: Family  ADL Assistance: Independent  Homemaking Assistance: Independent  Homemaking Responsibilities: Yes (pt reports family assists prn with household tasks)  Ambulation Assistance: Independent  Transfer Assistance: Independent  Active : Yes  Mode of Transportation: SUV  Occupation: Retired  Type of Occupation:   Leisure & Hobbies: \"Have fun\"  Additional Comments: Pt reports ex-wife is able to provide prn assist upon discharge. Pt is a questionable historian based on cognitive status this date  Vision/Hearing       Cognition   Cognition  Overall Cognitive Status: WFL     Objective                                Bed mobility  Supine to Sit:  (Pt began today's session sitting at the EOB.)  Sit to Supine: Supervision  Scooting: Supervision  Transfers  Sit to Stand: Stand by assistance  Stand to Sit: Stand by assistance  Ambulation  Surface: Level tile  Device: Rolling Walker  Assistance: Stand by assistance  Quality of Gait: good stability, decreased gait speed, no LOB. Gait Deviations: Increased SALVATORE; Decreased step length; Slow [place]  Distance: 30 feet, seated rest break, 250 feet  More Ambulation?: No     Balance  Posture: Good  Sitting - Static: Good  Sitting - Dynamic: Good  Standing - Static: Good;-  Standing - Dynamic: Fair;+  Comments: Standing balance assessed with RW. Exercise Treatment: standing: hip flexion x10 BLE no UE support, hip abduction x10 BLE BUE support, partial squats x10, heel raises x10 BLE. feet together eyes open x30 sec, eyes closed x30 sec, semi-tandem eyes open x30, eyes closed x30.                                                        AM-PAC Score  AM-PAC Inpatient Mobility Raw Score : 21 (01/10/23 1226)  AM-PAC Inpatient T-Scale Score : 50.25 (01/10/23 1226)  Mobility Inpatient CMS 0-100% Score: 28.97 (01/10/23 1226)  Mobility Inpatient CMS G-Code Modifier : CJ (01/10/23 1226)         Goals  Short Term Goals  Time Frame for Short Term Goals: 14  Short Term Goal 1: Pt to perform bed mobility independently  Short Term Goal 2: Demonstrate functional transfer independently  Short Term Goal 3: Pt to ambulate 100ft w/ least restrictive AD independently  Short Term Goal 4: Tolerate 30 minutes of therapy to demo increased endurance  Additional Goals?: No  Patient Goals   Patient Goals : To go home       Education  Patient Education  Education Given To: Patient  Education Provided: Plan of Care;Home Exercise Program;Transfer Training;Energy Conservation; Fall Prevention Strategies  Education Method: Demonstration;Verbal  Barriers to Learning: None  Education Outcome: Verbalized understanding;Demonstrated understanding      Therapy Time   Individual Concurrent Group Co-treatment   Time In 1033         Time Out 1057         Minutes 24         Timed Code Treatment Minutes: 24 Minutes       Becki Meneses, PT

## 2023-01-10 NOTE — DISCHARGE INSTRUCTIONS
Follow-up with PCP in 3 days. Call for an appointment soon as possible. Follow-up with gastroenterology as instructed. Call for an appointment as soon as possible. Medications as instructed. Obtain BMP in 1 week and follow-up with your PCP regarding the results. Return to the emergency department immediately for any new or worsening concerns.

## 2023-01-10 NOTE — PLAN OF CARE
Problem: Discharge Planning  Goal: Discharge to home or other facility with appropriate resources  Outcome: Progressing     Problem: Pain  Goal: Verbalizes/displays adequate comfort level or baseline comfort level  Outcome: Progressing     Problem: Respiratory - Adult  Goal: Achieves optimal ventilation and oxygenation  Outcome: Progressing     Problem: Cardiovascular - Adult  Goal: Maintains optimal cardiac output and hemodynamic stability  Outcome: Progressing  Goal: Absence of cardiac dysrhythmias or at baseline  Outcome: Progressing     Problem: Musculoskeletal - Adult  Goal: Return mobility to safest level of function  Outcome: Progressing  Goal: Maintain proper alignment of affected body part  Outcome: Progressing  Goal: Return ADL status to a safe level of function  Outcome: Progressing     Problem: Gastrointestinal - Adult  Goal: Minimal or absence of nausea and vomiting  Outcome: Progressing  Goal: Maintains or returns to baseline bowel function  Outcome: Progressing     Problem: Hematologic - Adult  Goal: Maintains hematologic stability  Outcome: Progressing     Problem: Skin/Tissue Integrity  Goal: Absence of new skin breakdown  Description: 1. Monitor for areas of redness and/or skin breakdown  2. Assess vascular access sites hourly  3. Every 4-6 hours minimum:  Change oxygen saturation probe site  4. Every 4-6 hours:  If on nasal continuous positive airway pressure, respiratory therapy assess nares and determine need for appliance change or resting period.   Outcome: Progressing     Problem: Safety - Adult  Goal: Free from fall injury  Outcome: Progressing     Problem: ABCDS Injury Assessment  Goal: Absence of physical injury  Outcome: Progressing     Problem: Nutrition Deficit:  Goal: Optimize nutritional status  Outcome: Progressing

## 2023-01-11 VITALS
HEART RATE: 90 BPM | BODY MASS INDEX: 30.14 KG/M2 | DIASTOLIC BLOOD PRESSURE: 60 MMHG | SYSTOLIC BLOOD PRESSURE: 119 MMHG | TEMPERATURE: 97.9 F | OXYGEN SATURATION: 99 % | HEIGHT: 70 IN | WEIGHT: 210.54 LBS | RESPIRATION RATE: 18 BRPM

## 2023-01-11 LAB — GLUCOSE BLD-MCNC: 101 MG/DL (ref 75–110)

## 2023-01-11 PROCEDURE — 6370000000 HC RX 637 (ALT 250 FOR IP): Performed by: INTERNAL MEDICINE

## 2023-01-11 PROCEDURE — 82947 ASSAY GLUCOSE BLOOD QUANT: CPT

## 2023-01-11 PROCEDURE — 99231 SBSQ HOSP IP/OBS SF/LOW 25: CPT | Performed by: STUDENT IN AN ORGANIZED HEALTH CARE EDUCATION/TRAINING PROGRAM

## 2023-01-11 RX ORDER — SPIRONOLACTONE 25 MG/1
25 TABLET ORAL DAILY
Qty: 30 TABLET | Refills: 3 | Status: SHIPPED | OUTPATIENT
Start: 2023-01-11 | End: 2023-02-03 | Stop reason: SDUPTHER

## 2023-01-11 RX ORDER — FUROSEMIDE 40 MG/1
40 TABLET ORAL 2 TIMES DAILY
Qty: 60 TABLET | Refills: 3 | Status: ON HOLD | OUTPATIENT
Start: 2023-01-11 | End: 2023-04-17 | Stop reason: SDUPTHER

## 2023-01-11 RX ADMIN — PANTOPRAZOLE SODIUM 40 MG: 40 TABLET, DELAYED RELEASE ORAL at 05:58

## 2023-01-11 ASSESSMENT — PAIN SCALES - GENERAL
PAINLEVEL_OUTOF10: 0

## 2023-01-11 NOTE — PROGRESS NOTES
Pt. States he would like to take his morning meds at home and not wait here for them to be sent from pharmacy. He has been assessed and vitals stable. Dr. Bayron Freeman was around to see pt. And ok with pt. Discharge.

## 2023-01-11 NOTE — PROGRESS NOTES
[facility]  Office: [address], DO, Kristin Males, DO, Marty Loop, DO, Faye Medina Blood, DO, Bud Gr MD, Deandre Wilson MD, Ag Mercedes MD, Negin Johnston MD,  Radha Rincon MD, Alyssia Marquez MD, Amalia Hoffmann, DO, Willie Abdullahi MD,  Reed Fink MD, Keon Rodney MD, Claudean Budds, DO, Keegan Ortiz MD, Gustabo Juarez MD, Everton Bey, DO, Michela Kelley MD, Carlos Rodriguez MD, La Chaparro MD, Maira Woods MD, Mariangel Trujillo DO, Wesley Navas MD, Leonor Javier MD, Nicky Garcia, CNP,  Irene Caldwell, CNP, Jose David Lemus, CNP, Mary Da Silva, CNP,  Travon Welsh, [facility], Magda Sparks, CNP, Fernand Cockayne, CNP, Doris Wallace, CNP, Yohan Downs, CNP, Dallas Tim, CNP, ARTI DelgadilloC, Iveth Kingsley, CNS, Aydee Merino, CNP, Chantale Anderson, CNP         [address]    Progress Note    1/11/2023    8:09 AM    Name:   Heavenly Lemus  MRN:     [id number]     [place]:      [de-identified]   Room:   [address] [place] [address] Day:  14  Admit Date:  12/28/2022 11:30 PM    PCP:   VASU Conner  Code Status:  Full Code    Subjective:     C/C: Abdominal pain. Interval History Status: improved. Vitals reviewed, afebrile and hemodynamically stable. Saturating well on room air. Labs reviewed, magnesium and potassium replaced yesterday. Overnight patient had no significant events. On examination patient sitting comfortably in bed. States he is ready to go home. Patient was discharged yesterday evening however had no ride available. Brief History:     Mr. Jerel Rincon is a 79-year-old male with a past medical history of esophageal cancer, hepatic encephalopathy, EtOH history with decompensated liver failure status post TIPS who initially presented with the chief complaint of abdominal pain on 12/28/2022. He was found to be anemic with extensive ascites.     Review of Systems: Constitutional: Positive for generalized anasarca improving significantly with diuretics. Negative for chills, fevers, sweats  Respiratory:  negative for cough, dyspnea on exertion, shortness of breath, wheezing  Cardiovascular: Positive for lower extremity and scrotal edema improving significantly with diuretic. Negative for chest pain, chest pressure/discomfort, palpitations  Gastrointestinal:  negative for abdominal pain, constipation, diarrhea, nausea, vomiting  Neurological:  negative for dizziness, headache    Medications:      Allergies:  No Known Allergies    Current Meds:   Scheduled Meds:    furosemide  40 mg Oral BID    spironolactone  25 mg Oral Daily    lactulose  10 g Oral BID    enoxaparin  40 mg SubCUTAneous Daily    pantoprazole  40 mg Oral QAM AC    insulin lispro  0.05 Units/kg SubCUTAneous TID WC    insulin lispro  0-4 Units SubCUTAneous TID WC    insulin lispro  0-4 Units SubCUTAneous Nightly    sodium chloride flush  5-40 mL IntraVENous 2 times per day    sodium chloride flush  5-40 mL IntraVENous 2 times per day     Continuous Infusions:    dextrose      dextrose      sodium chloride Stopped (01/10/23 1926)     PRN Meds: ipratropium-albuterol, potassium chloride **OR** potassium alternative oral replacement **OR** potassium chloride, magnesium sulfate, sodium phosphate IVPB **OR** sodium phosphate IVPB **OR** sodium phosphate IVPB, glucose, dextrose bolus **OR** dextrose bolus, glucagon (rDNA), dextrose, glucose, dextrose bolus **OR** dextrose bolus, glucagon (rDNA), dextrose, sodium chloride flush, sodium chloride, sodium chloride flush, ondansetron **OR** ondansetron    Data:     Past Medical History:   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 ([facility] Alcoholic cirrhosis of liver with ascites ([facility] [address] Anemia, Anxiety, Arthritis, Back pain, chronic, Lezama esophagus, Bleeding gastric varices, BPH (benign prostatic hypertrophy), Cholelithiasis, Cirrhosis ([facility] [address] COVID-19, COVID-19 vaccine series completed, DDD (degenerative disc disease), lumbar, Depression, Esophageal cancer ([address] Esophageal varices ([address] Fatty liver, GERD (gastroesophageal reflux disease), GI bleed, Hep C w/o coma, chronic ([address] History of alcohol abuse, History of blood transfusion, History of colon polyps, History of tobacco abuse, [place] (hard of hearing), Hyperlipidemia, Hypertension, Hyponatremia, Hypotension, Mastoid disorder, bilateral, PONV (postoperative nausea and vomiting), Port-A-Cath in place, Portal hypertension ([address] Sciatica, Secondary esophageal varices ([address] Shortness of breath, Spinal stenosis, Stomach ulcer, Thrombocytopenia ([address] Tubular adenoma of colon, Vitamin D deficiency, and Wears glasses. Social History:   reports that he quit smoking about 5 years ago. His smoking use included cigarettes. He has a 45.00 pack-year smoking history. He has never used smokeless tobacco. He reports that he does not currently use alcohol. He reports that he does not currently use drugs after having used the following drugs: Cocaine. Frequency: 1.00 time per week. Family History:   Family History   Problem Relation Age of Onset    Cancer Mother         pancreatic    Cancer Father         bone    Diabetes Sister     Asthma Brother     Allergies Brother         MULTIPLE       Vitals:  /60   Pulse 90   Temp 97.9 °F (36.6 °C) (Oral)   Resp 18   Ht 5' 10\" (1.778 m)   Wt 210 lb 8.6 oz (95.5 kg)   SpO2 99%   BMI 30.21 kg/m²   Temp (24hrs), Av.8 °F (36.6 °C), Min:97.5 °F (36.4 °C), Max:98 °F (36.7 °C)    Recent Labs     01/10/23  1208 01/10/23  1604 01/10/23  1950 23  0741   POCGLU 122* 137* 135* 101         I/O (24Hr):     Intake/Output Summary (Last 24 hours) at 2023 0809  Last data filed at 2023 0436  Gross per 24 hour   Intake 2488.85 ml   Output 1200 ml   Net 1288.85 ml         Labs:  Hematology:  No results for input(s): WBC, RBC, HGB, HCT, MCV, MCH, MCHC, RDW, PLT, MPV, SEDRATE, CRP, INR, DDIMER, [id number], LABABSO in the last 72 hours. Invalid input(s): PT    Chemistry:  Recent Labs     01/09/23  1145 01/10/23  1055 01/10/23  1602   NA  --  137  --    K 3.0* 3.1* 3.4*   CL  --  103  --    CO2  --  23  --    GLUCOSE  --  103*  --    BUN  --  7*  --    CREATININE  --  0.50*  --    MG  --  1.4*  --    ANIONGAP  --  11  --    LABGLOM  --  >60  --    CALCIUM  --  7.8*  --        Recent Labs     01/09/23  2047 01/10/23  0759 01/10/23  1208 01/10/23  1604 01/10/23  1950 01/11/23  0741   POCGLU 132* 117* 122* 137* 135* 101       ABG:  Lab Results   Component Value Date/Time    FIO2 INFORMATION NOT PROVIDED 12/30/2022 01:35 AM     Lab Results   Component Value Date/Time    SPECIAL RT HAND 5ML 01/01/2023 02:36 PM     Lab Results   Component Value Date/Time    CULTURE NO GROWTH 6 DAYS 01/03/2023 11:20 AM       Radiology:  CT CHEST PULMONARY EMBOLISM W CONTRAST    Result Date: 1/7/2023  No evidence of pulmonary embolism. Moderate bilateral pleural effusion is associated with atelectatic changes of right lower lobe and lingula and left lower lobe. . Focal consolidative change within the anterior aspect of right upper lobe peripheral aspect of left upper lobe and medial aspect of right middle lobe are likely suggestive of multifocal pneumonia. Moderate pericardial effusion. Cirrhotic liver with TIPS, ascites and portal hypertension . Calcified thrombosed splenic artery measuring 3.1 cm. . RECOMMENDATIONS: Unavailable     IR US GUIDED PARACENTESIS    Result Date: 1/3/2023  Successful ultrasound-guided therapeutic paracentesis. US GUIDED PARACENTESIS    Result Date: 1/6/2023  Successful ultrasound-guided paracentesis.        Physical Examination:        General appearance:  alert, cooperative and no distress  Mental Status:  oriented to person, place and time and normal affect  Lungs:  clear to auscultation bilaterally, normal effort  Heart:  regular rate and rhythm, no murmur  Abdomen:  soft, nontender, nondistended, normal bowel sounds, no masses, hepatomegaly, splenomegaly  Extremities: 1+ pitting bilateral lower extremity edema, no redness, no tenderness in the calves, there is some weeping from right upper extremity wound as well as some swelling to right upper extremity with known brachial DVT. Skin:  no gross lesions, rashes, induration    Assessment:        Hospital Problems             Last Modified POA    Ascites due to alcoholic cirrhosis ([address] 8/3/1173 Yes    Shortness of breath 1/8/2023 Yes    Other abnormalities of gait and mobility 12/29/2022 Yes    S/P TIPS (transjugular intrahepatic portosystemic shunt) 12/29/2022 Yes    Lezama's esophagus with dysplasia 12/30/2022 Yes    Acute deep vein thrombosis (DVT) of brachial vein of right upper extremity ([address] 1/8/2023 Yes    Chronic hepatitis C without hepatic coma ([address] 1/8/2023 Yes    Swelling of joint of upper arm, right 1/8/2023 Yes    Anasarca 1/9/2023 Yes    Decompensation of cirrhosis of liver ([address] 1/8/2023 Yes    DDD (degenerative disc disease), lumbar 12/29/2022 Yes    Acute hypokalemia 1/8/2023 Yes    Mastoid disorder, bilateral 12/31/2022 Yes    Overview Signed 12/31/2022  4:50 PM by Dani Katz MD     biLateral mastoid effusion            Plan:        Decompensated liver cirrhosis with ascites and anasarca. Status post TIPS revision 12/30/2022. Continue Lasix 40 mg daily and Aldactone 25 mg daily as well as lactulose with titration to 3-4 bowel movements daily. Continue high-protein oral supplementation and low-sodium diet. Patient will need GI outpatient follow-up for diuretic adjustment as well as frequent paracentesis. Acute upper GI bleed secondary to decompensated alcoholic cirrhosis and gastric AVM. Resolved. EGD demonstrated large varices with moderate portal hypertensive gastropathy. Continue Protonix 40 mg daily. Multifocal pneumonia. Resolved status post Levaquin. Hepatic encephalopathy. Resolved. Continue lactulose with titration to 3-4 bowel movements daily    Right upper extremity brachial DVT. Vascular surgery consulted and discussion between vascular surgery and GI as well as primary. Patient is very high risk for bleeding with anticoagulation. Given location there is plan to treat conservatively. Acute hypokalemia. Replaced. Debility. Has resolved with PT OT    DVT prophylaxis: Lovenox 40 mg daily. GI prophylaxis: Protonix 40 mg daily. Discharge planning: Plan to discharge patient home today. Discussed in length with patient regarding importance of following up with GI as soon as possible for adjustment of diuretics as well as frequency of paracentesis.     Rk Chaparro DO  1/11/2023  8:09 AM

## 2023-01-11 NOTE — CARE COORDINATION
Transitional Planning  Pura Matt notified of patient's discharge today and Dr. Sam Zheng competing [address].

## 2023-01-11 NOTE — PLAN OF CARE
Problem: Discharge Planning  Goal: Discharge to home or other facility with appropriate resources  1/10/2023 2142 by Michell Dietrich RN  Outcome: Progressing  1/10/2023 1658 by Monse Tavares RN  Outcome: Progressing     Problem: Pain  Goal: Verbalizes/displays adequate comfort level or baseline comfort level  1/10/2023 2142 by Michell Dietrich RN  Outcome: Progressing  1/10/2023 1658 by Monse Tavares RN  Outcome: Progressing     Problem: Respiratory - Adult  Goal: Achieves optimal ventilation and oxygenation  1/10/2023 2142 by Michell Dietrich RN  Outcome: Progressing  1/10/2023 1658 by Monse Tavares RN  Outcome: Progressing     Problem: Cardiovascular - Adult  Goal: Maintains optimal cardiac output and hemodynamic stability  1/10/2023 2142 by Michell Dietrich RN  Outcome: Progressing  1/10/2023 1658 by Monse Tavares RN  Outcome: Progressing  Goal: Absence of cardiac dysrhythmias or at baseline  1/10/2023 2142 by Michell Dietrich RN  Outcome: Progressing  1/10/2023 1658 by Monse Tavares RN  Outcome: Progressing     Problem: Musculoskeletal - Adult  Goal: Return mobility to safest level of function  1/10/2023 2142 by Michell Dietrich RN  Outcome: Progressing  1/10/2023 1658 by Monse Tavares RN  Outcome: Progressing  Goal: Maintain proper alignment of affected body part  1/10/2023 2142 by Michell Dietrich RN  Outcome: Progressing  1/10/2023 1658 by Monse Tavares RN  Outcome: Progressing  Goal: Return ADL status to a safe level of function  1/10/2023 2142 by Michell Dietrich RN  Outcome: Progressing  1/10/2023 1658 by Monse Tavares RN  Outcome: Progressing     Problem: Gastrointestinal - Adult  Goal: Minimal or absence of nausea and vomiting  1/10/2023 2142 by Michell Dietrich RN  Outcome: Progressing  1/10/2023 1658 by Monse Tavares RN  Outcome: Progressing  Goal: Maintains or returns to baseline bowel function  1/10/2023 2142 by Michell Dietrich RN  Outcome: Progressing  1/10/2023 1658 by Franklin Michel RN  Outcome: Progressing     Problem: Hematologic - Adult  Goal: Maintains hematologic stability  1/10/2023 2142 by Jonathan Luciano RN  Outcome: Progressing  1/10/2023 1658 by Franklin Michel RN  Outcome: Progressing     Problem: Skin/Tissue Integrity  Goal: Absence of new skin breakdown  Description: 1. Monitor for areas of redness and/or skin breakdown  2. Assess vascular access sites hourly  3. Every 4-6 hours minimum:  Change oxygen saturation probe site  4. Every 4-6 hours:  If on nasal continuous positive airway pressure, respiratory therapy assess nares and determine need for appliance change or resting period.   1/10/2023 2142 by Jonathan Luciano RN  Outcome: Progressing  1/10/2023 1658 by Franklin Michel RN  Outcome: Progressing     Problem: Safety - Adult  Goal: Free from fall injury  1/10/2023 2142 by Jonathan Luciano RN  Outcome: Progressing  1/10/2023 1658 by Franklin Michel RN  Outcome: Progressing     Problem: ABCDS Injury Assessment  Goal: Absence of physical injury  1/10/2023 2142 by Jonathan Luciano RN  Outcome: Progressing  1/10/2023 1658 by Franklin Michel RN  Outcome: Progressing     Problem: Nutrition Deficit:  Goal: Optimize nutritional status  1/10/2023 2142 by Jonathan Luciano RN  Outcome: Progressing  1/10/2023 1658 by Franklin Michel RN  Outcome: Progressing

## 2023-01-12 ENCOUNTER — CARE COORDINATION (OUTPATIENT)
Dept: CASE MANAGEMENT | Age: 63
End: 2023-01-12

## 2023-01-12 ENCOUNTER — TELEPHONE (OUTPATIENT)
Dept: PRIMARY CARE CLINIC | Age: 63
End: 2023-01-12

## 2023-01-12 DIAGNOSIS — K70.31 ASCITES DUE TO ALCOHOLIC CIRRHOSIS (HCC): Primary | ICD-10-CM

## 2023-01-12 PROCEDURE — 1111F DSCHRG MED/CURRENT MED MERGE: CPT

## 2023-01-12 NOTE — CARE COORDINATION
[facility] Care Transitions Initial Follow Up Call    Call within 2 business days of discharge: Yes    Care Transition Nurse contacted the patient by telephone to perform post hospital discharge assessment. Verified name and  with patient as identifiers. Provided introduction to self, and explanation of the Care Transition Nurse role. Patient: Edyta Fraser Patient : 1960   MRN: [id number]  Reason for Admission:   Discharge Date: 23 RARS: Readmission Risk Score: 27.1      Last Discharge [address]       Date Complaint Diagnosis Description Type Department Provider    22  Decompensation of cirrhosis of liver ([facility] [address] . .. Admission (Discharged) [address], DO            Was this an external facility discharge? No [place] [facility] [place]    Challenges to be reviewed by the provider   Additional needs identified to be addressed with provider: No  none               Method of communication with provider: none. Writer spoke to patient, reviewed discharge instructions and medications, 1111F order completed, reviewed  Follow-up with PCP in 3 days. Call for an appointment soon as possible. Follow-up with gastroenterology as instructed. Call for an appointment as soon as possible. Medications as instructed. Obtain BMP in 1 week and follow-up with your PCP regarding the results. Return to the emergency department immediately for any new or worsening concerns. Patient denies f/c, n/v/d, cp, dizziness, reviewed upcoming procedure and labs, has HFU with pcp on 23, explained role of CTN, provided contact information, will follow//JU    Care Transition Nurse reviewed discharge instructions, medical action plan, and red flags with patient who verbalized understanding. The patient was given an opportunity to ask questions and does not have any further questions or concerns at this time. Were discharge instructions available to patient? Yes.  Reviewed appropriate site of care based on symptoms and resources available to patient including: PCP  Specialist  Urgent care clinics  Home health  When to call 911. The patient agrees to contact the PCP office for questions related to their healthcare. Advance Care Planning:   Does patient have an Advance Directive: reviewed and current. Medication reconciliation was performed with patient, who verbalizes understanding of administration of home medications. Medications reviewed, 1111F entered: yes    Was patient discharged with a pulse oximeter? Non-face-to-face services provided:  Scheduled appointment with PCP-1/17/23  Obtained and reviewed discharge summary and/or continuity of care documents  Reviewed and followed up on pending diagnostic tests and treatments-paracentesis on 4/95/44  Basic Metabolic Panel  Complete by: Jan 17, 2023  Please send results to patient's PCP  Communication with home health agencies or other community services the patient is currently using-patient has [facility], [address] confirmed//JU  Education of patient/family/caregiver/guardian to support self-management-  If you notice any sign or symptom that indicates that you may be having a stroke, activate the  emergency medical services immediately by calling 9-1-1. These symptoms include: uneven  facial expression, arm(s) drifting down, strange speech or loss of speech, vision problems, sudden  severe headache, sudden numbness or face/arms/legs, sudden confusion or difficult  understanding, sudden dizziness, sudden difficulty with walking. Continue or begin taking the  medications prescribed by your physician as listed above. There are personal risk that are  associated with Stroke. Anyone can have a stroke no matter your age, race or gender. The  chances of having a stroke increase if you have certain risk factors. The best way to protect  yourself and loved ones from stroke is to understand your personal risk and how to manage it.   There are 2 types of risk factors for stroke: uncontrollable and controllable. Uncontrollable risk factors include being over age 54, being male, being ,   or /, or having a personal or family history of a stroke or transient  ischemic attack (TIA). Controllable risk factors generally fall into two categories: lifestyle risk factors or medical risk  factors. Lifestyle risk factors can often be changed, while medical risk factors can usually be  treated. Both types can be managed best by working with a doctor, who can prescribe  medications and advise on how to adopt a healthy lifestyle. Controllable risk factors include high  blood pressure, atrial fibrillation, high cholesterol, diabetes, atherosclerosis, circulation problems,  tobacco use or smoking, alcohol use, lack of exercise, and obesity. ___ Statin prescribed or ___ Not applicable  Recognize signs and symptoms of STROKE:  B-stands for trouble with balance  E-for eyes and vision problems  F-for facial droopiness  A-for arm (or leg) weakness  S-for speech problems  T-is for time. A call to 911 is immediately placed  Migue Hidalgo Printed at 1/11/2023 7:45 AM Page 1 of 2  Migue Rocky (MR # [id number] / ACCT # [de-identified]) CSN [de-identified] Page 2 of 2  Important Information for Stroke and Acute MI (continued)  Warning Signs of HEART ATTACK  Call 911 if you have these symptoms:  · Chest discomfort. Most heart attacks involve discomfort in the center of the  chest that lasts more than a few minutes, or that goes away and comes back. It can  feel like uncomfortable pressure, squeezing, fullness, or pain. · Discomfort in other areas of the upper body. Symptoms can include pain or  discomfort in one or both arms, the back, neck, jaw, or stomach. · Shortness of breath with or without chest discomfort. · Other signs may include breaking out in a cold sweat, nausea, or lightheadedness. Don't wait more than five minutes to call 911 - MINUTES MATTER!  Fast action can  save your life. Calling 911 is almost always the fastest way to get lifesaving treatment. Emergency Medical Services staff can begin treatment when they arrive - up to an hour  sooner than if someone gets to the hospital by car. Assessment and support for treatment adherence and medication management-    Offered patient enrollment in the Remote Patient Monitoring (RPM) program for in-home monitoring: . Patient not eligible for RPM program    Care Transitions 24 Hour Call    Schedule Follow Up Appointment with PCP: Completed  Do you have a copy of your discharge instructions?: Yes  Do you have all of your prescriptions and are they filled?: Yes  Have you been contacted by a [address] [employer] Pharmacist?: No  Have you scheduled your follow up appointment?: Yes  How are you going to get to your appointment?: Car - family or friend to transport  [address]: [address] (Comment: [place])  Do you feel like you have everything you need to keep you well at home?: Yes  Care Transitions Interventions     Other Therapy Services: Completed      Other Services: Completed   Disease Association: Completed Palliative Care: Completed              Follow Up  Future Appointments   Date Time Provider Carolyn English   1/13/2023  1:00 PM STC IR  [facility] SPECIAL STC Radiolog   1/17/2023 10:00 AM VASU Will STAR PC MHTOLPP   1/20/2023  1:00 PM STC IR  [facility] SPECIAL STC Radiolog   1/27/2023  1:00 PM STC IR  [facility]Z SPECIAL STC Radiolog   2/1/2023 11:00 AM Arpit Nguyen MD [address]   2/3/2023  1:00 PM STC IR  ST[facility]Z SPECIAL STC Radiolog   2/8/2023  1:00 PM [facility] [facility] [address], MD GRT [facility] GI MHTOLPP   2/10/2023  1:00 PM STC IR  STCZ SPECIAL STC Radiolog   2/16/2023  1:15 PM Lacy Sánchez MD [facility] M[facility]   2/17/2023  1:00 PM STC IR  STCZ SPECIAL STC Radiolog   2/24/2023  1:00 PM STC IR  STCZ SPECIAL STC Radiolog   3/3/2023  1:00 PM STC IR  STCZ SPECIAL STC Radiolog 3/10/2023  1:00 PM STC IR  STCZ SPECIAL STC Radiolog   3/17/2023  1:00 PM STC IR  STCZ SPECIAL STC Radiolog   3/24/2023  1:00 PM STC IR  [facility]CZ SPECIAL STC Radiolog   3/31/2023  1:00 PM STC IR  [facility]CZ SPECIAL STC Radiolog   4/7/2023  1:00 PM STC IR  STCZ SPECIAL STC Radiolog   4/14/2023  1:00 PM STC IR  STCZ SPECIAL STC Radiolog   4/21/2023  1:00 PM STC IR  [facility]Z SPECIAL STC Radiolog   4/28/2023  1:00 PM STC IR  STCZ SPECIAL STC Radiolog   5/5/2023  1:00 PM STC IR  STCZ SPECIAL STC Radiolog   5/12/2023  1:00 PM STC IR  STCZ SPECIAL STC Radiolog   5/19/2023  1:00 PM STC IR  [facility] SPECIAL STC Radiolog   5/26/2023  1:00 PM STC IR  [facility]CZ SPECIAL STC Radiolog   6/2/2023  1:00 PM STC IR  [facility]CZ SPECIAL STC Radiolog   6/9/2023  1:00 PM STC IR  [facility]CZ SPECIAL STC Radiolog   6/16/2023  1:00 PM STC IR  STCZ SPECIAL STC Radiolog   6/23/2023  1:00 PM STC IR  STCZ SPECIAL STC Radiolog       Care Transition Nurse provided contact information. Plan for follow-up call in 3-5 days based on severity of symptoms and risk factors.   Plan for next call: symptom management-   self management-   follow-up appointment-     Pramod Thrasher RN

## 2023-01-12 NOTE — CARE COORDINATION
[place] [facility] [place]  Written by: Hayde العراقي RN    Patient Name: Cheryle Copp  Attending Provider: No att. providers found  Admit Date: 2022 11:30 PM  MRN: [id number]  Account: [de-identified]                     : 1960  Discharge Date: 2023      Disposition: home with Sarthak Ferro RN

## 2023-01-12 NOTE — CARE COORDINATION
Care Transitions Outreach Attempt    Call within 2 business days of discharge: Yes   Attempted to reach patient for transitions of care follow up. Unable to reach patient. Patient: Sandeep Zamora Patient : 1960 MRN: [id number]    Last Discharge [address]       Date Complaint Diagnosis Description Type Department Provider    22  Decompensation of cirrhosis of liver ([facility] [address] . .. Admission (Discharged) [address], DO          # 1 attempt-Attempted initial 24 hour hospital follow up call. Left a Hip[employer] compliant message with name and call back information. Requested return call to [phone]. Was this an external facility discharge?  No Discharge Facility: [facility]    Noted following upcoming appointments from discharge chart review:   [facility] follow up appointment(s):   Future Appointments   Date Time Provider Carolyn English   2023  1:00 PM STC IR  [facility] SPECIAL STC Radiolog   2023  1:00 PM STC IR  [facility] SPECIAL STC Radiolog   2023  1:00 PM STC IR  [facility] SPECIAL STC Radiolog   2023 11:00 AM Arpit Nguyen MD [address] [place] [address]   2/3/2023  1:00 PM STC IR  [facility]CZ SPECIAL STC Radiolog   2023  1:00 PM Sony Rose MD [facility] GI TOLPP   2/10/2023  1:00 PM STC IR  STCZ SPECIAL STC Radiolog   2023  1:15 PM Lacy Sánchez MD U[facility] CANCER MHTOLPP   2023  1:00 PM STC IR  [facility]CZ SPECIAL STC Radiolog   2023  1:00 PM STC IR  STCZ SPECIAL STC Radiolog   3/3/2023  1:00 PM STC IR  [facility] SPECIAL STC Radiolog   3/10/2023  1:00 PM STC IR  [facility] SPECIAL STC Radiolog   3/17/2023  1:00 PM STC IR  [facility]CZ SPECIAL STC Radiolog   3/24/2023  1:00 PM STC IR  [facility]Z SPECIAL STC Radiolog   3/31/2023  1:00 PM STC IR  [facility] SPECIAL STC Radiolog   2023  1:00 PM STC IR  STCZ SPECIAL STC Radiolog   2023  1:00 PM STC IR  [facility]CZ SPECIAL STC Radiolog   2023  1:00 PM STC IR  [facility] SPECIAL STC Radiolog   4/28/2023  1:00 PM STC IR  [facility]CZ SPECIAL STC Radiolog   5/5/2023  1:00 PM STC IR  [facility]CZ SPECIAL STC Radiolog   5/12/2023  1:00 PM STC IR  [facility]CZ SPECIAL STC Radiolog   5/19/2023  1:00 PM STC IR  [facility] SPECIAL STC Radiolog   5/26/2023  1:00 PM STC IR  [facility]Z SPECIAL STC Radiolog   6/2/2023  1:00 PM STC IR  [facility] SPECIAL STC Radiolog   6/9/2023  1:00 PM STC IR  [facility] SPECIAL STC Radiolog   6/16/2023  1:00 PM STC IR  [facility]CZ SPECIAL STC Radiolog   6/23/2023  1:00 PM STC IR  STCZ SPECIAL STC Radiolog     Non-[facility] follow up appointment(s):

## 2023-01-12 NOTE — TELEPHONE ENCOUNTER
Care Transitions Initial Follow Up Call    Outreach made within 2 business days of discharge: Yes    Patient: Sana Irving Patient : 1960   MRN: [id number]  Reason for Admission: There are no discharge diagnoses documented for the most recent discharge. Discharge Date: 23       Spoke with: Iam Yepez    Discharge department/facility: [facility] Interactive Patient Contact:  Was patient able to fill all prescriptions: Yes  Was patient instructed to bring all medications to the follow-up visit: Yes  Is patient taking all medications as directed in the discharge summary?  Yes  Does patient understand their discharge instructions: Yes  Does patient have questions or concerns that need addressed prior to 7-14 day follow up office visit: no    Scheduled appointment with PCP within 7-14 days    Follow Up  Future Appointments   Date Time Provider Carolyn English   2023  1:00 PM [facility] IR [address] Radiolog   2023 10:00 AM VASU Sanchez STAR PC M[facility]OLPP   2023  1:00 PM STC IR  [facility] SPECIAL STC Radiolog   2023  1:00 PM STC IR  [facility]CZ SPECIAL STC Radiolog   2023 11:00 AM Starr Carney MD [address] [place] [address]   2/3/2023  1:00 PM STC IR  STCZ SPECIAL STC Radiolog   2023  1:00 PM Aleksandr Magaña MD [facility] GI MHTOLPP   2/10/2023  1:00 PM STC IR  ST[facility] SPECIAL STC Radiolog   2023  1:15 PM Hortensia Chris MD [facility]   2023  1:00 PM STC IR  [facility] SPECIAL STC Radiolog   2023  1:00 PM STC IR  [facility] SPECIAL STC Radiolog   3/3/2023  1:00 PM STC IR  [facility] SPECIAL STC Radiolog   3/10/2023  1:00 PM STC IR  [facility] SPECIAL STC Radiolog   3/17/2023  1:00 PM STC IR  [facility] SPECIAL STC Radiolog   3/24/2023  1:00 PM STC IR  [facility] SPECIAL STC Radiolog   3/31/2023  1:00 PM STC IR  STCZ SPECIAL STC Radiolog   2023  1:00 PM STC IR  [facility]CZ SPECIAL STC Radiolog   2023  1:00 [place] [id number] [place] STC Radiolog   4/21/2023  1:00 PM STC IR  [facility] SPECIAL STC Radiolog   4/28/2023  1:00 PM STC IR  [facility] SPECIAL STC Radiolog   5/5/2023  1:00 PM STC IR  [facility] SPECIAL STC Radiolog   5/12/2023  1:00 PM STC IR  [facility] SPECIAL STC Radiolog   5/19/2023  1:00 PM STC IR  [facility]Z SPECIAL STC Radiolog   5/26/2023  1:00 PM STC IR  [facility] SPECIAL STC Radiolog   6/2/2023  1:00 PM STC IR  [facility] SPECIAL STC Radiolog   6/9/2023  1:00 PM STC IR  [facility] SPECIAL STC Radiolog   6/16/2023  1:00 PM STC IR  STCZ SPECIAL STC Radiolog   6/23/2023  1:00 [place] [address] [place] STC Radiolog       [place], [place]

## 2023-01-13 ENCOUNTER — HOSPITAL ENCOUNTER (OUTPATIENT)
Dept: INTERVENTIONAL RADIOLOGY/VASCULAR | Age: 63
End: 2023-01-13
Payer: MEDICARE

## 2023-01-13 VITALS
RESPIRATION RATE: 20 BRPM | WEIGHT: 213 LBS | DIASTOLIC BLOOD PRESSURE: 61 MMHG | TEMPERATURE: 97.7 F | OXYGEN SATURATION: 100 % | BODY MASS INDEX: 30.49 KG/M2 | SYSTOLIC BLOOD PRESSURE: 106 MMHG | HEART RATE: 86 BPM | HEIGHT: 70 IN

## 2023-01-13 DIAGNOSIS — K70.31 ALCOHOLIC CIRRHOSIS OF LIVER WITH ASCITES (HCC): ICD-10-CM

## 2023-01-13 PROCEDURE — 7100000010 HC PHASE II RECOVERY - FIRST 15 MIN

## 2023-01-13 PROCEDURE — 7100000011 HC PHASE II RECOVERY - ADDTL 15 MIN

## 2023-01-13 PROCEDURE — 2580000003 HC RX 258: Performed by: RADIOLOGY

## 2023-01-13 PROCEDURE — 2709999900 IR US GUIDED PARACENTESIS

## 2023-01-13 PROCEDURE — 49083 ABD PARACENTESIS W/IMAGING: CPT

## 2023-01-13 RX ORDER — SODIUM CHLORIDE 9 MG/ML
INJECTION, SOLUTION INTRAVENOUS PRN
Status: DISCONTINUED | OUTPATIENT
Start: 2023-01-13 | End: 2023-01-16 | Stop reason: HOSPADM

## 2023-01-13 RX ORDER — SODIUM CHLORIDE 0.9 % (FLUSH) 0.9 %
5-40 SYRINGE (ML) INJECTION PRN
Status: DISCONTINUED | OUTPATIENT
Start: 2023-01-13 | End: 2023-01-16 | Stop reason: HOSPADM

## 2023-01-13 RX ORDER — SODIUM CHLORIDE 0.9 % (FLUSH) 0.9 %
5-40 SYRINGE (ML) INJECTION EVERY 12 HOURS SCHEDULED
Status: DISCONTINUED | OUTPATIENT
Start: 2023-01-13 | End: 2023-01-16 | Stop reason: HOSPADM

## 2023-01-13 RX ADMIN — SODIUM CHLORIDE: 9 INJECTION, SOLUTION INTRAVENOUS at 12:48

## 2023-01-13 ASSESSMENT — ENCOUNTER SYMPTOMS
SHORTNESS OF BREATH: 0
WHEEZING: 0
NAUSEA: 0
CONSTIPATION: 0
BLOOD IN STOOL: 0
VOMITING: 0
COUGH: 0
ABDOMINAL DISTENTION: 1

## 2023-01-13 ASSESSMENT — PAIN - FUNCTIONAL ASSESSMENT
PAIN_FUNCTIONAL_ASSESSMENT: NONE - DENIES PAIN

## 2023-01-13 NOTE — DISCHARGE INSTRUCTIONS
DISCHARGE INSTRUCTIONS FOR PARACENTESIS    In order to continue your care at home, please follow the instructions below. Medications    Use over-the-counter pain medication as directed on bottle for pain control    Post Procedure Site/Care/Activity  For up to 2 days after the procedure, you may have a small amount of clear fluid coming out of the site where the needle was inserted, especially if you had a lot of fluid removed. You may need to change the bandage on the site. Do not lie totally flat until morning. You can do your normal activities after the procedure, unless instructed differently. Call your doctor or seek immediate medical care if:   You have symptoms of infection, such as increased pain, swelling, warmth, or redness, red streaks or pus. An oral temperature (by mouth) is 101 degrees or higher (fever), chills, fever. You are dizzy or lightheaded, or you feel like you may faint. You have new or worse belly pain. Watch closely for changes in your health, and be sure to contact your doctor if:   Fluid builds up in your belly again. You do not get better as expected.       IF YOU CANNOT REACH THE DOCTOR, GO TO THE NEAREST EMERGENCY ROOM OR CALL 911    Phone: Interventional Radiology   [phone]  After hours Radiology   [phone]     2.2 liters removed by Dr Samira Villalobos

## 2023-01-13 NOTE — PROGRESS NOTES
Alert and oriented. US in use. Rt abd prepped draped. Numbed and accessed by Dr Rafal Novak. Obtained 2.2 L of cloudy yellow fluid. Access removed, dressing applied.  Tolerated well Report called to   Maria A

## 2023-01-17 ENCOUNTER — TELEPHONE (OUTPATIENT)
Dept: PRIMARY CARE CLINIC | Age: 63
End: 2023-01-17

## 2023-01-17 ENCOUNTER — OFFICE VISIT (OUTPATIENT)
Dept: PRIMARY CARE CLINIC | Age: 63
End: 2023-01-17

## 2023-01-17 ENCOUNTER — TELEPHONE (OUTPATIENT)
Dept: GASTROENTEROLOGY | Age: 63
End: 2023-01-17

## 2023-01-17 VITALS
OXYGEN SATURATION: 98 % | DIASTOLIC BLOOD PRESSURE: 66 MMHG | HEIGHT: 70 IN | BODY MASS INDEX: 30.35 KG/M2 | HEART RATE: 86 BPM | WEIGHT: 212 LBS | SYSTOLIC BLOOD PRESSURE: 110 MMHG

## 2023-01-17 DIAGNOSIS — K92.0 GASTROINTESTINAL HEMORRHAGE WITH HEMATEMESIS: ICD-10-CM

## 2023-01-17 DIAGNOSIS — Z09 HOSPITAL DISCHARGE FOLLOW-UP: Primary | ICD-10-CM

## 2023-01-17 DIAGNOSIS — R94.31 ABNORMAL ELECTROCARDIOGRAM (ECG) (EKG): ICD-10-CM

## 2023-01-17 DIAGNOSIS — Z13.31 POSITIVE DEPRESSION SCREENING: ICD-10-CM

## 2023-01-17 DIAGNOSIS — I31.39 PERICARDIAL EFFUSION: ICD-10-CM

## 2023-01-17 DIAGNOSIS — I82.621 ACUTE DEEP VEIN THROMBOSIS (DVT) OF BRACHIAL VEIN OF RIGHT UPPER EXTREMITY (HCC): ICD-10-CM

## 2023-01-17 DIAGNOSIS — K70.30 ALCOHOLIC CIRRHOSIS, UNSPECIFIED WHETHER ASCITES PRESENT (HCC): ICD-10-CM

## 2023-01-17 DIAGNOSIS — D69.6 THROMBOCYTOPENIA (HCC): ICD-10-CM

## 2023-01-17 DIAGNOSIS — J18.9 PNEUMONIA OF BOTH LUNGS DUE TO INFECTIOUS ORGANISM, UNSPECIFIED PART OF LUNG: ICD-10-CM

## 2023-01-17 RX ORDER — LACTULOSE 10 G/15ML
SOLUTION ORAL
Qty: 473 ML | Refills: 1 | Status: SHIPPED | OUTPATIENT
Start: 2023-01-17

## 2023-01-17 ASSESSMENT — PATIENT HEALTH QUESTIONNAIRE - PHQ9
SUM OF ALL RESPONSES TO PHQ9 QUESTIONS 1 & 2: 0
SUM OF ALL RESPONSES TO PHQ QUESTIONS 1-9: 10
1. LITTLE INTEREST OR PLEASURE IN DOING THINGS: 0
6. FEELING BAD ABOUT YOURSELF - OR THAT YOU ARE A FAILURE OR HAVE LET YOURSELF OR YOUR FAMILY DOWN: 3
10. IF YOU CHECKED OFF ANY PROBLEMS, HOW DIFFICULT HAVE THESE PROBLEMS MADE IT FOR YOU TO DO YOUR WORK, TAKE CARE OF THINGS AT HOME, OR GET ALONG WITH OTHER PEOPLE: 0
3. TROUBLE FALLING OR STAYING ASLEEP: 3
9. THOUGHTS THAT YOU WOULD BE BETTER OFF DEAD, OR OF HURTING YOURSELF: 0
7. TROUBLE CONCENTRATING ON THINGS, SUCH AS READING THE NEWSPAPER OR WATCHING TELEVISION: 1
SUM OF ALL RESPONSES TO PHQ QUESTIONS 1-9: 10
SUM OF ALL RESPONSES TO PHQ QUESTIONS 1-9: 10
5. POOR APPETITE OR OVEREATING: 0
8. MOVING OR SPEAKING SO SLOWLY THAT OTHER PEOPLE COULD HAVE NOTICED. OR THE OPPOSITE, BEING SO FIGETY OR RESTLESS THAT YOU HAVE BEEN MOVING AROUND A LOT MORE THAN USUAL: 0
4. FEELING TIRED OR HAVING LITTLE ENERGY: 3
2. FEELING DOWN, DEPRESSED OR HOPELESS: 0
SUM OF ALL RESPONSES TO PHQ QUESTIONS 1-9: 10

## 2023-01-17 NOTE — PROGRESS NOTES
Post-Discharge Transitional Care  Follow Up      Walt Burn   YOB: 1960    Date of Office Visit:  1/17/2023  Date of Hospital Admission: 12/28/22  Date of Hospital Discharge: 1/11/23  Risk of hospital readmission (high >=14%. Medium >=10%) :Readmission Risk Score: 27.1      Care management risk score Rising risk (score 2-5) and Complex Care (Scores >=6): No Risk Score On File     Non face to face  following discharge, date last encounter closed (first attempt may have been earlier): 01/12/2023    Call initiated 2 business days of discharge: Yes    ASSESSMENT/PLAN:   Hospital discharge follow-up  -     [place] DISCHARGE MEDS RECONCILED W/ CURRENT OUTPATIENT MED LIST  -     [place] DISCHARGE MEDS RECONCILED W/ CURRENT OUTPATIENT MED LIST  Pericardial effusion  -     Echocardiogram complete; Future  Acute deep vein thrombosis (DVT) of brachial vein of right upper extremity (HCC)  Positive depression screening  Thrombocytopenia (HCC)  Alcoholic cirrhosis, unspecified whether ascites present (HCC)  -     lactulose (CHRONULAC) 10 GM/15ML solution; take 30 milliliters by mouth three times a day, Disp-473 mL, R-1Normal  Gastrointestinal hemorrhage with hematemesis  -     Hemoglobin and Hematocrit; Future  Abnormal electrocardiogram (ECG) (EKG)   -     Echocardiogram complete; Future  Pneumonia of both lungs due to infectious organism, unspecified part of lung    Medical Decision Making: high complexity  No follow-ups on file. We will follow-up with recheck of hemoglobin and hematocrit today. Will call GI office to see if patient can have sooner appointment. Patient educated to call and set up vascular appointment as well as complete echo. Patient educated to take lactulose 30 mL up to 3 times daily however can take them closer together in order to have 2-3 bowel movements daily. Follow-up with GI to determine any further changes to this dosage.   Needs follow-up with GI to determine frequency of paracentesis as well as possible diuretic adjustments per hospitalist discharge note. Needs follow-up with vascular surgery for blood clot as his bleeding risk is too high to start blood thinners. Subjective:   HPI:  Follow up of Hospital problems/diagnosis(es): Decompensated liver failure with acities. He was recently admitted with anemia and abd pain. Bleeding has resolved. Had to modify the TIPS. No appointment with vascular for the blood clot yet. Had pneumonia. Inpatient course: Discharge summary reviewed- see chart. Interval history/Current status: Needs follow up with GI and vascular. Has [employer] at home at this time. Lactulose not working as well as how he was taking it in the hospital.  Some SOB has increased no fevers. Has appointment with GI 2-8-23. Needs follow-up with vascular for blood clot which is in right upper arm. Cannot start blood thinners due to risk of bleeding. Has home health at this time working well.     Patient Active Problem List   Diagnosis    Back pain, chronic    Hearing difficulty    GERD (gastroesophageal reflux disease)    Cervical radicular pain    Hypomagnesemia    Chronic viral hepatitis B without delta agent and without coma (HCC)    Calculus of gallbladder without cholecystitis    Hep C w/o coma, chronic (HCC)    Fatty liver    Psychophysiologic insomnia    Cirrhosis ([address]    Decompensation of cirrhosis of liver (HCC)    Vertebrogenic low back pain    DDD (degenerative disc disease), lumbar    Depression    Tubular adenoma of colon    History of colon polyps    Gynecomastia, male    Lumbar radiculitis    Lumbar disc herniation    Tinnitus    Eustachian tube dysfunction    Ganglion cyst    Carpal tunnel syndrome of right wrist    History of hepatitis C    Vitamin D deficiency    Pure hypercholesterolemia    Acute hypokalemia    Essential hypertension    Recurrent major depressive disorder in partial remission (HCC)    S/P epidural steroid injection    Elevated LFTs    Seasonal allergies    Lumbar facet arthropathy    Cervical facet syndrome    Thrombocytopenia (HCC)    Hepatitis C virus infection resolved after antiviral drug therapy    Alcohol abuse    Altered mental status    Hypocalcemia    Hypophosphatemia    Malignant neoplasm of lower third of esophagus (HCC)    COVID-19    Anxiety    Current smoker    Severe comorbid illness    Gait instability    Abnormal findings on diagnostic imaging of spine    Cervical spinal stenosis    Spinal stenosis of lumbar region with neurogenic claudication    Low hemoglobin    Symptomatic anemia, microcytic, acute    Hypotension    Former smoker, 50+ pack years, quit 2016    HLD (hyperlipidemia)    Esophageal adenocarcinoma ([address]    Anemia    Acute kidney injury ([address]    Ascites due to alcoholic cirrhosis ([address]    Shortness of breath    Drop in hemoglobin    Esophageal polyp    Acute kidney failure, unspecified (HCC)    Muscle weakness (generalized)    Other abnormalities of gait and mobility    GI bleed    Goals of care, counseling/discussion    ACP (advance care planning)    Palliative care encounter    S/P TIPS (transjugular intrahepatic portosystemic shunt)    Lezama's esophagus with dysplasia    Mastoid disorder, bilateral    Acute deep vein thrombosis (DVT) of brachial vein of right upper extremity (HCC)    Chronic hepatitis C without hepatic coma (HCC)    Swelling of joint of upper arm, right    Anasarca       Medications listed as ordered at the time of discharge from hospital     Medication List            Accurate as of January 17, 2023 10:39 AM. If you have any questions, ask your nurse or doctor.                 CONTINUE taking these medications      atorvastatin 20 MG tablet  Commonly known as: LIPITOR  Take 1 tablet by mouth nightly     FeroSul 325 (65 Fe) MG tablet  Generic drug: ferrous sulfate  take 1 tablet by mouth twice a day     furosemide 40 MG tablet  Commonly known as: LASIX  Take 1 tablet by mouth in the morning and 1 tablet in the evening. lactulose 10 GM/15ML solution  Commonly known as: CHRONULAC  take 30 milliliters by mouth three times a day     pantoprazole 40 MG tablet  Commonly known as: PROTONIX     spironolactone 25 MG tablet  Commonly known as: ALDACTONE  Take 1 tablet by mouth daily               Where to Get Your Medications        These medications were sent to [address], [address] [facility]  [address] [place], [address]      Phone: [phone]   lactulose 10 GM/15ML solution           Medications marked \"taking\" at this time  Outpatient Medications Marked as Taking for the 1/17/23 encounter (Office Visit) with VASU Rubi   Medication Sig Dispense Refill    lactulose (CHRONULAC) 10 GM/15ML solution take 30 milliliters by mouth three times a day 473 mL 1    furosemide (LASIX) 40 MG tablet Take 1 tablet by mouth in the morning and 1 tablet in the evening. 60 tablet 3    spironolactone (ALDACTONE) 25 MG tablet Take 1 tablet by mouth daily 30 tablet 3    pantoprazole (PROTONIX) 40 MG tablet Take 40 mg by mouth daily      FEROSUL 325 (65 Fe) MG tablet take 1 tablet by mouth twice a day 60 tablet 5    atorvastatin (LIPITOR) 20 MG tablet Take 1 tablet by mouth nightly 30 tablet 3        Medications patient taking as of now reconciled against medications ordered at time of hospital discharge: Yes    A comprehensive review of systems was negative except for what was noted in the HPI. Objective:    /66   Pulse 86   Ht 5' 10\" (1.778 m)   Wt 212 lb (96.2 kg)   SpO2 98%   BMI 30.42 kg/m²   Physical Exam  Constitutional:       Comments: gestalt of liver failure. HENT:      Head: Normocephalic and atraumatic. Cardiovascular:      Rate and Rhythm: Normal rate and regular rhythm. Pulses: Normal pulses. Heart sounds: Normal heart sounds. No murmur heard.   Pulmonary:      Effort: Pulmonary effort is normal. Breath sounds: Normal breath sounds. Abdominal:      General: There is distension. Neurological:      General: No focal deficit present. Mental Status: He is oriented to person, place, and time. Psychiatric:         Mood and Affect: Mood normal.         Behavior: Behavior normal.         An electronic signature was used to authenticate this note. --VASU Alexander      PHQ-9 score today: (PHQ-9 Total Score: 10), additional evaluation and assessment performed, follow-up plan includes but not limited to: Medication management and Referral to /Specialist  for evaluation and management.

## 2023-01-17 NOTE — TELEPHONE ENCOUNTER
Per henry, called Gi Dr. Cortez Host to get pt scheduled for a sooner appt. Pt has an appt on 2/8/2023. GI office stated they will call pt and set that up.     Dr. Mendez Rounds office: [phone]

## 2023-01-20 ENCOUNTER — HOSPITAL ENCOUNTER (OUTPATIENT)
Dept: INTERVENTIONAL RADIOLOGY/VASCULAR | Age: 63
End: 2023-01-20
Payer: MEDICARE

## 2023-01-20 VITALS
OXYGEN SATURATION: 100 % | DIASTOLIC BLOOD PRESSURE: 75 MMHG | WEIGHT: 214.7 LBS | SYSTOLIC BLOOD PRESSURE: 104 MMHG | RESPIRATION RATE: 16 BRPM | HEART RATE: 88 BPM | HEIGHT: 70 IN | BODY MASS INDEX: 30.74 KG/M2 | TEMPERATURE: 97.9 F

## 2023-01-20 DIAGNOSIS — K70.31 ALCOHOLIC CIRRHOSIS OF LIVER WITH ASCITES (HCC): ICD-10-CM

## 2023-01-20 PROCEDURE — 7100000010 HC PHASE II RECOVERY - FIRST 15 MIN

## 2023-01-20 PROCEDURE — 2709999900 IR US GUIDED PARACENTESIS

## 2023-01-20 PROCEDURE — 49083 ABD PARACENTESIS W/IMAGING: CPT

## 2023-01-20 RX ORDER — SODIUM CHLORIDE 0.9 % (FLUSH) 0.9 %
5-40 SYRINGE (ML) INJECTION PRN
Status: DISCONTINUED | OUTPATIENT
Start: 2023-01-20 | End: 2023-01-23 | Stop reason: HOSPADM

## 2023-01-20 RX ORDER — SODIUM CHLORIDE 9 MG/ML
INJECTION, SOLUTION INTRAVENOUS PRN
Status: DISCONTINUED | OUTPATIENT
Start: 2023-01-20 | End: 2023-01-23 | Stop reason: HOSPADM

## 2023-01-20 RX ORDER — SODIUM CHLORIDE 0.9 % (FLUSH) 0.9 %
5-40 SYRINGE (ML) INJECTION EVERY 12 HOURS SCHEDULED
Status: DISCONTINUED | OUTPATIENT
Start: 2023-01-20 | End: 2023-01-23 | Stop reason: HOSPADM

## 2023-01-20 RX ORDER — HEPARIN SODIUM (PORCINE) LOCK FLUSH IV SOLN 100 UNIT/ML 100 UNIT/ML
500 SOLUTION INTRAVENOUS
Status: ACTIVE | OUTPATIENT
Start: 2023-01-20 | End: 2023-01-21

## 2023-01-20 NOTE — DISCHARGE INSTRUCTIONS
DISCHARGE INSTRUCTIONS FOR PARACENTESIS    In order to continue your care at home, please follow the instructions below. Medications    Use over-the-counter pain medication as directed on bottle for pain control    Post Procedure Site/Care/Activity  For up to 2 days after the procedure, you may have a small amount of clear fluid coming out of the site where the needle was inserted, especially if you had a lot of fluid removed. You may need to change the bandage on the site. Do not lie totally flat until morning. You can do your normal activities after the procedure, unless instructed differently. Call your doctor or seek immediate medical care if:   You have symptoms of infection, such as increased pain, swelling, warmth, or redness, red streaks or pus. An oral temperature (by mouth) is 101 degrees or higher (fever), chills, fever. You are dizzy or lightheaded, or you feel like you may faint. You have new or worse belly pain. Watch closely for changes in your health, and be sure to contact your doctor if:   Fluid builds up in your belly again. You do not get better as expected. IF YOU CANNOT REACH THE DOCTOR, GO TO THE NEAREST EMERGENCY ROOM OR CALL 911    Phone: Interventional Radiology   [phone]  After hours Radiology   [phone]   3.8L were removed  Dr Erik Hylton performed your procedure.

## 2023-01-20 NOTE — PROGRESS NOTES
Patient tolerated paracentesis without distress. 3800 ml of mildly chylous fluid removed. Dry dressing to site. Patient returned to room. Nurse updated.

## 2023-01-20 NOTE — BRIEF OP NOTE
Brief Postoperative Note for Paracentesis    Dino Reyes  YOB: 1960  [id number]    Pre-operative Diagnosis: Ascites      Post-operative Diagnosis: Same    Procedure: Ultrasound guided Paracentesis     Anesthesia: 1% Lidocaine     Surgeons/Assistants: Carin Najjar, MD    Complications: none    Specimens: were not obtained    Ultrasound guided paracentesis performed. 3800 ml chylous appearing fluid obtained from RUQ. Dressing applied. Vital signs were reviewed and were stable after the procedure. Discharged to HOME.       Electronically signed by Carin Najjar, MD on 1/20/2023 at 2:06 PM     2

## 2023-01-20 NOTE — H&P
HISTORY and [address] [facility] [address]       NAME:  Haven Bear  MRN: [id number]   YOB: 1960   Date: 1/20/2023   Age: 60 y.o. Gender: male     H&P Update Note    H&P from 01/13/2023 reviewed and updated. Patient examined. Patient will be having:   IR PARACENTESIS    INTERVAL HISTORY:   Patient is feeling well today, denies any fever/chills, chest pain, shortness of breath. No interval changes. Haven Bear is 60 y.o.,  male, here today for paracentesis. Patient has history of liver cirrhosis with ascites. Patient recently had TIPS done 12/01/22 and follows up with GI. Patient's last paracentesis was on 1/13/23 with a total of 2.2 L of clear yellow fluid was aspirated. Patient is receiving paracentesis treatments weekly. Patient reports significant relief of symptoms after treatments. No significant changes. Patient reports abdominal distension, fullness and pressure without  radiation. Pt denies any localized abdominal tenderness. Patient admits to associated shortness of breath, improved by sitting in chair or lying down. Patient denies any appetite changes due  to fullness. He states that he eats well. Patient has regular bowel movements and is taking lactulose  Patient Notice that is  easy bleeding/bruising. Patient  reports noticing  swelling in the lower legs bilaterally and is on lasix and aldactone. Patient had frosted flakes this am. Did not take any medicine. .  No blood thinners in the past 5 days. Pt denies any acute symptoms of illness at this time including , No CP, fever, URI or UTI symptoms. No interval changes to past medical history, social history, family history. Review of systems as stated above and otherwise negative.     PHYSICAL EXAM:     Vitals: /71   Pulse 97   Temp 97.3 °F (36.3 °C) (Temporal)   Resp 18   Ht 5' 10\" (1.778 m)   Wt 214 lb 11.2 oz (97.4 kg)   SpO2 100%   BMI 30.81 kg/m² Body mass index is 30.81 kg/m². Patient is alert and oriented, in no distress. Normotensive. Heart rate and rhythm are regular. Lungs clear to auscultation bilaterally. Abdomen is distended with ascites and positive fluid wave. Pt has 3+ pitting edema. in nydia LE. Has right chest port in place. No interval changes. I concur with the findings. Jian Anton, APRN - CNP on 1/20/2023 at 12:15 PM          HISTORY and [address]        NAME:  Ellie Turcios  MRN: [id number]   YOB: 1960   Date: 1/13/2023   Age: 60 y.o. Gender: male         COMPLAINT AND PRESENT HISTORY:     Ellie Turcios is 60 y.o.,  male, presents for paracentesis treatment for alcoholic cirrhosis      Primary dx: Alcoholic cirrhosis      HPI:  See portion of the note below per Wilfrido Tyson MD  Physician Infectious Disease  Initial history  Ellie Turcios is a 60y.o.-year-old male who presented to the hospital 12/29 because of weakness fatigue, recurrent upper GI bleed with melena and anemia. Admitted for GI work-up, he was having bloody stools/melena  Initially seen at [facility] started on octreotide, transferred to [facility] for reevaluation of the TIPS  CT abdomen showed rectal spasm with fluid in the rectum. TIPS was apparently patent. Ascites sample 12/28 was culture negative, And the number of WBCs was not suggestive of infection. A repeat paracentesis was performed 1/3/23, culture: No growth   CT abdomen and pelvis suggested possible bilateral lower lobe atelectasis. Upon review these changes could potentially represent pneumonia. Infectious disease consulted to manage antibiotics, the patient was started on broad spectrum antibiotics on 1/1, which he completed on 1-7-23. Pt had Paracentesis,  on 1/3/23 he has  2200 ml yellow clear fluid remover.  WBC 90 and cx pend - doubt infected  Had EGD 1/3 which shows no bleed, grade 1-2 varices distal esophagus and few AVMs and mild diffuse portal HTN  1/5/23  Increased shortness of breath, he is on room air at this point, abdomen distended,  Tips have been revisited on 12/30     Update HPI:  Orestes Kimball is 60 y.o.,  male, here today for paracentesis. Patient is receiving treatments paracentesis   Patient has history of liver cirrhosis with ascites. Pt state he was at [facility] ER and he get admitted  on 12/27/ 22 and he discahrege on 1/11/23 due to black tarry stools, and abdominal pain. Pt states he had 2 unit of packed cell transfusion due to low hemoglobin. Pt has history of esophageal cancer, s/p TIPS procedure, patient undergo serial paracentesis. Patient last had last paracentesis on 1/3/23 with A total of 2200 ml yellow clear fluid was removed. Pt denies any abdominal pain today , he states : My Body is full of water\". Patient admits to associated shortness of breath, improved by sitting in chair, worse when lying flat. Patient is  easy bleeding/bruising. Patient is swelling in the lower legs bilaterally. No other concerns today, no recent fever/chills, no chest pain, no shortness of breath currently. Review of additional significant medical hx:  (See chart for additional detail, including current medications /see ROS for current S/S):   ESOPHAGEAL CA, HEP. C, HX OF ETOH ABUSE,  PORTAL HTN, ESOPHAGEAL VARICES, CIRRHOSIS, HTN, HLD, JIMENES ESOPHAGUS, COVID-19, SOB. ECHO Complete 2D W Doppler W Color 12/28/22     CONCLUSIONS  Summary  Left ventricle is normal in size. Global left ventricular systolic function  is normal. Calculated ejection fraction 60% by Heart Model. No significant valvular regurgitation or stenosis seen. Moderate circumferential pericardial effusion seen. Measures 2.2cm  anteriorly and 2.26cm posteriorly. Clot-like structure noted in anterior pericardial space. No signs of tamponade. Consider clinical correlation.      EKG 12 Lead 12/28/22  Narrative & Impression  Sinus tachycardia  Low voltage QRS  Nonspecific T wave abnormality  Abnormal ECG  When compared with ECG of 01-JAN-2023 18:26,  Nonspecific T wave abnormality now evident in Lateral leads     NPO status: pt NPO since 9:30 am today   Medications taken TODAY (with sip of water): no am medication today   Anticoagulation status: none  Denies personal hx of blood clots. Denies personal hx of MRSA infection. Denies any personal or family hx of previous complications w/anesthesia.      PAST MEDICAL HISTORY     Past Medical History:  Diagnosis Date   Adenocarcinoma in a polyp ([address]     Alcoholic cirrhosis of liver with ascites ([address]     Anemia 04/13/2022   Anxiety     Arthritis     Back pain, chronic      dr. Layla Quezada, orthopedic, every 3-4 months, gets steroid injection   Lezama esophagus     Bleeding gastric varices 12/29/2022   BPH (benign prostatic hypertrophy)     Cholelithiasis     Cirrhosis ([address]     COVID-19 12/2020    pt reports he had a positive test while at [facility] in 2020, was asymptomatic   COVID-19 vaccine series completed 5/20/2021, 6/22/2021    Moderna 5/20/2021, 6/22/2021   DDD (degenerative disc disease), lumbar     Depression     Esophageal cancer ([address]      INVASIVE ADENOCARCINOMA ARISING IN TUBULAR ADENOMA WITH HIGH GRADE DYSPLASIA, ASSOCIATED WITH FOCAL INTESTINAL METAPLASIA    Esophageal varices ([address]     Fatty liver     GERD (gastroesophageal reflux disease)     GI bleed     Hep C w/o coma, chronic ([address]     History of alcohol abuse      6-12 beers a day; quit drinking 2019   History of blood transfusion     History of colon polyps 2016   History of tobacco abuse     [place] (hard of hearing)     Hyperlipidemia     Hypertension     Hyponatremia 07/20/2016   Hypotension 12/20/2021   Mastoid disorder, bilateral 12/31/2022    biLateral mastoid effusion   PONV (postoperative nausea and vomiting)     Port-A-Cath in place      right upper chest   Portal hypertension (HCC)     Sciatica Secondary esophageal varices ([facility] 06/07/2022   Shortness of breath     Spinal stenosis     Stomach ulcer      hx of   Thrombocytopenia ([address] 12/23/2020   Tubular adenoma of colon 2016, 2018   Vitamin D deficiency     Wears glasses          SURGICAL HISTORY       Past Surgical History:  Procedure Laterality Date   BUNIONECTOMY        twice on right side   BUNIONECTOMY Left     CARPAL TUNNEL RELEASE Right     COLONOSCOPY        at age 35   COLONOSCOPY   10/05/2016    polyps-pathology tubular adenoma, and abnormal looking mucosa right colon-pathology-tubular adenoma   COLONOSCOPY N/A 03/30/2018    COLONOSCOPY POLYPECTOMY COLD BIOPSY performed by Catracho Lamar MD at [address] [place] [address]   03/30/2018    Small polyp in the sigmoid colon and excised with biopsy forceps--tubular adenoma   COLONOSCOPY N/A 04/16/2022    COLONOSCOPY POLYPECTOMY performed by Catracho Lamar MD at [address] [place] [address], [place], DIAGNOSTIC        EGD   ESOPHAGOGASTRODUODENOSCOPY   12/29/2022   ESOPHAGOGASTRODUODENOSCOPY N/A 01/03/2023   IR PORT PLACEMENT EQUAL OR GREATER THAN 5 YEARS   04/19/2021    IR PORT PLACEMENT EQUAL OR GREATER THAN 5 YEARS 4/19/2021 [facility]   IR TIPS INSERTION   12/01/2022    IR TIPS INSERTION 12/1/2022 Trini Oh MD [facility]   KNEE SURGERY Left      cyst removed   NASAL SEPTUM SURGERY       NERVE BLOCK Right 11/23/2020    NERVE BLOCK RIGHT CERVICAL STEROID INJECTION  C3-C6 performed by Dk Godoy MD at [address] [place] [address]   01/04/2016    steroid injection C7 T1   OTHER SURGICAL HISTORY   11/21/2016    Bilateral Lumbar CACHORRO L4-L5 injections   OTHER SURGICAL HISTORY   12/19/2016    lumbar steroid injection   OTHER SURGICAL HISTORY   09/28/2018    BILATERAL L5 CACHORRO (N/A Back)   OTHER SURGICAL HISTORY Right 11/23/2020    cervical injection   PAIN MANAGEMENT PROCEDURE Left 07/09/2020    EPIDURAL STEROID INJECTION LEFT L4 L5 performed by Dk Godoy MD at [facility]   PAIN MANAGEMENT PROCEDURE Left 07/20/2020    LEFT L4 L5 EPIDURAL STEROID INJECTION performed by Elle Garcia MD at [facility] Bilateral 08/17/2020    LUMBAR FACET BILATERAL L2-L5 performed by Elle Garcia MD at [facility] Bilateral 12/07/2020    NERVE BLOCK BILATERAL LUMBAR MEDIAL BRANCH L2-L5 performed by Elle Garcia MD at [address] [place] NEUROPLASTY &/TRANSPOS MEDIAN NRV CARPAL Alba Vivienne Right 08/29/2017    CARPAL TUNNEL RELEASE RIGHT performed by Karolyn Cabrera MD at [address] &/TRANSPOS MEDIAN NRV CARPAL TUNNE Left 10/31/2017    CARPAL TUNNEL RELEASE performed by Karolyn Cabrera MD at [address] AA&/STRD TFRML EPI LUMBAR/SACRAL 1 LEVEL Bilateral 09/06/2018    BILATERAL L5 CACHORRO performed by Elle Garcia MD at [address] AA&/STRD TFRML EPI LUMBAR/SACRAL 1 LEVEL N/A 09/28/2018    BILATERAL L5 CACHORRO performed by Elle Garcia MD at [address] N/A 12/29/2020    EGD BIOPSY performed by Mark Hicks MD at [address] [place] [address] 02/02/2021    EGD BIOPSY and spot marking performed by Claudia Ortiz MD at [address] [place] [address] N/A 02/12/2021    ENDOSCOPIC ULTRASOUND, EGD performed by Ketan Lozano MD at [address] [place] [address],[facility]   02/12/2021    EGD DIAGNOSTIC ONLY performed by Ketan Lozano MD at [address] [place] [address],[facility] 08/31/2021    EGD BIOPSY performed by Claudia Ortiz MD at [address] [place] [address] 01/21/2022    EGD BIOPSY performed by Claudia Ortiz MD at [address] [place] [address] N/A 04/15/2022    EGD ESOPHAGOGASTRODUODENOSCOPY performed by Mark Hicks MD at [address] [place] [address] 06/06/2022    EGD BAND LIGATION performed by Leonardo Mohr MD at [address] [place] [address] GASTROINTESTINAL ENDOSCOPY N/A 2022    EGD ESOPHAGOGASTRODUODENOSCOPY performed by Yimi Yung MD at [address] N/A 2022    EGD ESOPHAGOGASTRODUODENOSCOPY ENDOSCOPIC APC AT GE JUNCTION performed by Rosio León MD at [address] N/A 10/19/2022    EGD BIOPSY performed by Yazmin Hoover MD at [address] N/A 10/31/2022    EGD with APC performed by Yazmin Hoover MD at [address]   2022    EGD ESOPHAGOGASTRODUODENOSCOPY performed by Akira Verma MD at [id number] [place] [address] 1/3/2023    EGD ESOPHAGOGASTRODUODENOSCOPY performed by Wanda Rios MD at [address]       Family History  Problem Relation Age of Onset   Cancer Mother          pancreatic   Cancer Father          bone   Diabetes Sister     Asthma Brother     Allergies Brother          MULTIPLE        SOCIAL HISTORY       Social History     Socioeconomic History   Marital status: Single  Tobacco Use   Smoking status: Former      Packs/day: 1.00      Years: 45.00      Pack years: 45.00      Types: Cigarettes      Quit date: 2017      Years since quittin.9   Smokeless tobacco: Never  Vaping Use   Vaping Use: Never used  Substance and Sexual Activity   Alcohol use: Not Currently      Comment: Quit alcohol in 2019- heavier drinking prior to quitting   Drug use: Not Currently      Frequency: 1.0 times per week      Types: Cocaine      Comment: Cocaine- stopped spring 2016   Sexual activity: Yes      Partners: Female  Social History Narrative     in the past, retired     Social Determinants of Health     Financial Resource Strain: Low Risk    Difficulty of Paying Living Expenses: Not hard at all  Food Insecurity: No Food Insecurity   Worried About [address] in the Last Year: Never true   Ran Out of Food in the Last Year: Never true  Physical Activity: Inactive   Days of Exercise per Week: 0 days   Minutes of Exercise per Session: 0 min           REVIEW OF SYSTEMS      No Known Allergies     Current Outpatient Medications on File Prior to Encounter  Medication Sig Dispense Refill   furosemide (LASIX) 40 MG tablet Take 1 tablet by mouth in the morning and 1 tablet in the evening. 60 tablet 3   spironolactone (ALDACTONE) 25 MG tablet Take 1 tablet by mouth daily 30 tablet 3   pantoprazole (PROTONIX) 40 MG tablet Take 40 mg by mouth daily       FEROSUL 325 (65 Fe) MG tablet take 1 tablet by mouth twice a day 60 tablet 5   lactulose (CHRONULAC) 10 GM/15ML solution take 30 milliliters by mouth three times a day 473 mL 1   FLUoxetine (PROZAC) 20 MG capsule Take 1 capsule by mouth daily 30 capsule 3   atorvastatin (LIPITOR) 20 MG tablet Take 1 tablet by mouth nightly 30 tablet 3     No current facility-administered medications on file prior to encounter. Review of Systems   Constitutional:  Positive for unexpected weight change. Negative for activity change, appetite change and fatigue. HENT: Negative. Eyes:  Positive for visual disturbance. Wearing eye glasses    Respiratory:  Negative for cough, shortness of breath and wheezing. Cardiovascular:  Positive for leg swelling. Negative for chest pain and palpitations. Gastrointestinal:  Positive for abdominal distention. Negative for blood in stool, constipation, nausea and vomiting. Genitourinary: Negative. Musculoskeletal: Negative. Skin: Negative. Neurological: Negative. Hematological:  Bruises/bleeds easily. Psychiatric/Behavioral: Negative. GENERAL PHYSICAL EXAM     Vitals: /68   Pulse 94   Temp 97.7 °F (36.5 °C) (Temporal)   Resp 20   Ht 5' 10\" (1.778 m)   Wt 218 lb (98.9 kg)   SpO2 94%   BMI 31.28 kg/m²  Body mass index is 31.28 kg/m².       GENERAL APPEARANCE:   Fernando Orozco is 60 y.o.,  male, mildly obese, nourished, conscious, alert. Does not appear to be distress or pain at this time. Physical Exam  Constitutional:       General: He is not in acute distress. Appearance: Normal appearance. He is obese. He is not ill-appearing. HENT:      Head: Normocephalic. Right Ear: External ear normal.      Left Ear: External ear normal.      Nose: Nose normal.      Mouth/Throat:      Mouth: Mucous membranes are moist.   Eyes:      General:         Right eye: No discharge. Left eye: No discharge. Cardiovascular:      Rate and Rhythm: Normal rate and regular rhythm. Pulses: Normal pulses. Radial pulses are 2+ on the right side and 2+ on the left side. Dorsalis pedis pulses are 2+ on the right side and 2+ on the left side. Posterior tibial pulses are 2+ on the right side and 2+ on the left side. Heart sounds: Normal heart sounds. No murmur heard. Pulmonary:      Effort: Pulmonary effort is normal.      Breath sounds: Normal breath sounds. No wheezing or rhonchi. Abdominal:      General: Bowel sounds are normal. There is distension. Palpations: Abdomen is soft. Comments: Ascites    Musculoskeletal:         General: No swelling. Right lower le+ Edema present. Left lower le+ Edema present. Skin:     General: Skin is warm and dry. Findings: Bruising present. No erythema. Neurological:      General: No focal deficit present. Mental Status: He is alert and oriented to person, place, and time. Motor: No weakness.       Gait: Gait normal.   Psychiatric:         Mood and Affect: Mood normal.         Behavior: Behavior normal.                 PROVISIONAL DIAGNOSES / SURGERY:      Paracentesis      Alcoholic cirrhosis      Patient Active Problem List    Diagnosis Date Noted   Anasarca 2023   Chronic hepatitis C without hepatic coma ([facility] 2023   Swelling of joint of upper arm, right 01/08/2023   Acute deep vein thrombosis (DVT) of brachial vein of right upper extremity ([address] 01/07/2023   Lezama's esophagus with dysplasia 12/30/2022   S/P TIPS (transjugular intrahepatic portosystemic shunt) 12/01/2022   Goals of care, counseling/discussion 10/31/2022   ACP (advance care planning) 10/31/2022   Palliative care encounter 10/31/2022   GI bleed 09/13/2022   Esophageal polyp 06/07/2022   Drop in hemoglobin 06/03/2022   Shortness of breath     Ascites due to alcoholic cirrhosis ([address] 33/81/3337   Acute kidney failure, unspecified ([address] 04/21/2022   Muscle weakness (generalized) 07/28/2016   Other abnormalities of gait and mobility 07/28/2016   Mastoid disorder, bilateral 12/31/2022   Anemia 04/13/2022   Acute kidney injury ([address] 04/13/2022   Esophageal adenocarcinoma (HCC)     Low hemoglobin 12/20/2021   Symptomatic anemia, microcytic, acute 12/20/2021   Hypotension 12/20/2021   Former smoker, 50+ pack years, quit 2016 12/20/2021   HLD (hyperlipidemia) 12/20/2021   Abnormal findings on diagnostic imaging of spine 12/14/2021   Cervical spinal stenosis 12/14/2021   Spinal stenosis of lumbar region with neurogenic claudication 12/14/2021   Severe comorbid illness 11/30/2021   Gait instability 11/30/2021   Current smoker 04/05/2021   COVID-19 02/23/2021   Anxiety 02/23/2021   Malignant neoplasm of lower third of esophagus ([address]     Hypocalcemia 12/26/2020   Hypophosphatemia 12/26/2020   Alcohol abuse     Altered mental status     Thrombocytopenia ([address] 12/23/2020   Hepatitis C virus infection resolved after antiviral drug therapy 12/23/2020   Cervical facet syndrome 11/23/2020   Lumbar facet arthropathy 08/17/2020   Elevated LFTs 08/12/2020   Seasonal allergies 08/12/2020   S/P epidural steroid injection 08/05/2020   Essential hypertension 04/24/2019   Recurrent major depressive disorder in partial remission ([address] 04/24/2019   Pure hypercholesterolemia 02/04/2019   Acute hypokalemia 02/04/2019   Vitamin D deficiency 09/20/2017   History of hepatitis C 09/11/2017   Ganglion cyst 05/31/2017   Carpal tunnel syndrome of right wrist 05/31/2017   Tinnitus 03/23/2017   Eustachian tube dysfunction 03/23/2017   Lumbar radiculitis 11/08/2016   Lumbar disc herniation 11/08/2016   Gynecomastia, male 10/26/2016   Depression 10/13/2016   Vertebrogenic low back pain 10/06/2016   DDD (degenerative disc disease), lumbar 10/06/2016   Decompensation of cirrhosis of liver ([facility] 09/15/2016   Psychophysiologic insomnia 09/14/2016   Cirrhosis ([facility]     Hep C w/o coma, chronic (HCC)     Fatty liver     Calculus of gallbladder without cholecystitis 08/10/2016   Chronic viral hepatitis B without delta agent and without coma ([facility] 07/22/2016   Hypomagnesemia     Cervical radicular pain 01/04/2016   Tubular adenoma of colon 01/01/2016   History of colon polyps 01/01/2016   Back pain, chronic 04/19/2012   Hearing difficulty 04/19/2012   GERD (gastroesophageal reflux disease) 04/19/2012           MASSIMO Hadley - CNP on 1/13/2023 at 1:03 PM

## 2023-01-24 ENCOUNTER — HOSPITAL ENCOUNTER (OUTPATIENT)
Age: 63
Discharge: HOME | End: 2023-01-24
Payer: MEDICARE

## 2023-01-24 ENCOUNTER — TELEPHONE (OUTPATIENT)
Dept: OTHER | Facility: CLINIC | Age: 63
End: 2023-01-24

## 2023-01-24 ENCOUNTER — APPOINTMENT (OUTPATIENT)
Dept: GENERAL RADIOLOGY | Age: 63
DRG: 812 | End: 2023-01-24
Payer: MEDICARE

## 2023-01-24 ENCOUNTER — HOSPITAL ENCOUNTER (INPATIENT)
Age: 63
LOS: 4 days | Discharge: HOME | DRG: 812 | End: 2023-01-28
Attending: STUDENT IN AN ORGANIZED HEALTH CARE EDUCATION/TRAINING PROGRAM | Admitting: INTERNAL MEDICINE
Payer: MEDICARE

## 2023-01-24 DIAGNOSIS — D64.9 ANEMIA, UNSPECIFIED TYPE: Primary | ICD-10-CM

## 2023-01-24 LAB
ALBUMIN SERPL-MCNC: 2.4 G/DL (ref 3.5–5.2)
ALP BLD-CCNC: 197 U/L (ref 40–129)
ALT SERPL-CCNC: 25 U/L (ref 5–41)
AMMONIA: 34 UMOL/L (ref 16–60)
ANION GAP SERPL CALCULATED.3IONS-SCNC: 6 MMOL/L (ref 9–17)
AST SERPL-CCNC: 69 U/L
BILIRUB SERPL-MCNC: 1.5 MG/DL (ref 0.3–1.2)
BILIRUBIN DIRECT: 0.9 MG/DL
BILIRUBIN, INDIRECT: 0.6 MG/DL (ref 0–1)
BUN BLDV-MCNC: 13 MG/DL (ref 8–23)
CALCIUM SERPL-MCNC: 7.7 MG/DL (ref 8.6–10.4)
CHLORIDE BLD-SCNC: 102 MMOL/L (ref 98–107)
CO2: 23 MMOL/L (ref 20–31)
CREAT SERPL-MCNC: 0.53 MG/DL (ref 0.7–1.2)
ETHANOL PERCENT: 0.25 %
ETHANOL: 252 MG/DL
GFR SERPL CREATININE-BSD FRML MDRD: >60 ML/MIN/1.73M2
GLUCOSE BLD-MCNC: 112 MG/DL (ref 70–99)
HCT VFR BLD CALC: 20.6 % (ref 41–53)
HEMOGLOBIN: 6.4 G/DL (ref 13.5–17.5)
INR BLD: 1.4
LIPASE: 34 U/L (ref 13–60)
MAGNESIUM: 1.7 MG/DL (ref 1.6–2.6)
POTASSIUM SERPL-SCNC: 4.2 MMOL/L (ref 3.7–5.3)
PROTHROMBIN TIME: 17 SEC (ref 11.8–14.6)
SODIUM BLD-SCNC: 131 MMOL/L (ref 135–144)
TOTAL PROTEIN: 5 G/DL (ref 6.4–8.3)

## 2023-01-24 PROCEDURE — 80076 HEPATIC FUNCTION PANEL: CPT

## 2023-01-24 PROCEDURE — G0480 DRUG TEST DEF 1-7 CLASSES: HCPCS

## 2023-01-24 PROCEDURE — 86920 COMPATIBILITY TEST SPIN: CPT

## 2023-01-24 PROCEDURE — 86850 RBC ANTIBODY SCREEN: CPT

## 2023-01-24 PROCEDURE — 71045 X-RAY EXAM CHEST 1 VIEW: CPT

## 2023-01-24 PROCEDURE — 85018 HEMOGLOBIN: CPT

## 2023-01-24 PROCEDURE — 83735 ASSAY OF MAGNESIUM: CPT

## 2023-01-24 PROCEDURE — 85610 PROTHROMBIN TIME: CPT

## 2023-01-24 PROCEDURE — 80048 BASIC METABOLIC PNL TOTAL CA: CPT

## 2023-01-24 PROCEDURE — 36415 COLL VENOUS BLD VENIPUNCTURE: CPT

## 2023-01-24 PROCEDURE — 2060000000 HC ICU INTERMEDIATE R&B

## 2023-01-24 PROCEDURE — 86901 BLOOD TYPING SEROLOGIC RH(D): CPT

## 2023-01-24 PROCEDURE — 85014 HEMATOCRIT: CPT

## 2023-01-24 PROCEDURE — 86900 BLOOD TYPING SEROLOGIC ABO: CPT

## 2023-01-24 PROCEDURE — 99285 EMERGENCY DEPT VISIT HI MDM: CPT

## 2023-01-24 PROCEDURE — 83690 ASSAY OF LIPASE: CPT

## 2023-01-24 PROCEDURE — 85025 COMPLETE CBC W/AUTO DIFF WBC: CPT

## 2023-01-24 PROCEDURE — 82140 ASSAY OF AMMONIA: CPT

## 2023-01-24 RX ORDER — ACETAMINOPHEN 325 MG/1
650 TABLET ORAL EVERY 6 HOURS PRN
Status: DISCONTINUED | OUTPATIENT
Start: 2023-01-24 | End: 2023-01-28 | Stop reason: HOSPADM

## 2023-01-24 RX ORDER — SODIUM CHLORIDE 0.9 % (FLUSH) 0.9 %
10 SYRINGE (ML) INJECTION PRN
Status: DISCONTINUED | OUTPATIENT
Start: 2023-01-24 | End: 2023-01-28 | Stop reason: HOSPADM

## 2023-01-24 RX ORDER — SODIUM CHLORIDE 9 MG/ML
INJECTION, SOLUTION INTRAVENOUS CONTINUOUS
Status: DISCONTINUED | OUTPATIENT
Start: 2023-01-24 | End: 2023-01-25

## 2023-01-24 RX ORDER — SODIUM CHLORIDE 0.9 % (FLUSH) 0.9 %
5-40 SYRINGE (ML) INJECTION EVERY 12 HOURS SCHEDULED
Status: DISCONTINUED | OUTPATIENT
Start: 2023-01-24 | End: 2023-01-28 | Stop reason: HOSPADM

## 2023-01-24 RX ORDER — ACETAMINOPHEN 650 MG/1
650 SUPPOSITORY RECTAL EVERY 6 HOURS PRN
Status: DISCONTINUED | OUTPATIENT
Start: 2023-01-24 | End: 2023-01-28 | Stop reason: HOSPADM

## 2023-01-24 RX ORDER — SODIUM CHLORIDE 9 MG/ML
INJECTION, SOLUTION INTRAVENOUS PRN
Status: DISCONTINUED | OUTPATIENT
Start: 2023-01-24 | End: 2023-01-28 | Stop reason: HOSPADM

## 2023-01-24 RX ORDER — PANTOPRAZOLE SODIUM 40 MG/1
40 TABLET, DELAYED RELEASE ORAL DAILY
Status: DISCONTINUED | OUTPATIENT
Start: 2023-01-25 | End: 2023-01-28 | Stop reason: HOSPADM

## 2023-01-24 RX ORDER — ONDANSETRON 2 MG/ML
4 INJECTION INTRAMUSCULAR; INTRAVENOUS EVERY 6 HOURS PRN
Status: DISCONTINUED | OUTPATIENT
Start: 2023-01-24 | End: 2023-01-28 | Stop reason: HOSPADM

## 2023-01-24 RX ORDER — FERROUS SULFATE 325(65) MG
325 TABLET ORAL 2 TIMES DAILY WITH MEALS
Status: DISCONTINUED | OUTPATIENT
Start: 2023-01-25 | End: 2023-01-28 | Stop reason: HOSPADM

## 2023-01-24 RX ORDER — ATORVASTATIN CALCIUM 20 MG/1
20 TABLET, FILM COATED ORAL NIGHTLY
Status: DISCONTINUED | OUTPATIENT
Start: 2023-01-24 | End: 2023-01-28 | Stop reason: HOSPADM

## 2023-01-24 RX ORDER — FUROSEMIDE 40 MG/1
40 TABLET ORAL 2 TIMES DAILY
Status: DISCONTINUED | OUTPATIENT
Start: 2023-01-25 | End: 2023-01-28 | Stop reason: HOSPADM

## 2023-01-24 RX ORDER — LACTULOSE 10 G/15ML
20 SOLUTION ORAL 3 TIMES DAILY
Status: DISCONTINUED | OUTPATIENT
Start: 2023-01-24 | End: 2023-01-28 | Stop reason: HOSPADM

## 2023-01-24 RX ORDER — ONDANSETRON 4 MG/1
4 TABLET, ORALLY DISINTEGRATING ORAL EVERY 8 HOURS PRN
Status: DISCONTINUED | OUTPATIENT
Start: 2023-01-24 | End: 2023-01-28 | Stop reason: HOSPADM

## 2023-01-24 ASSESSMENT — ENCOUNTER SYMPTOMS
VOMITING: 0
EYE DISCHARGE: 0
DIARRHEA: 0
ABDOMINAL PAIN: 0
NAUSEA: 0
SORE THROAT: 0
CHEST TIGHTNESS: 0
EYE REDNESS: 0
RHINORRHEA: 0
SHORTNESS OF BREATH: 1

## 2023-01-24 ASSESSMENT — PAIN - FUNCTIONAL ASSESSMENT
PAIN_FUNCTIONAL_ASSESSMENT: NONE - DENIES PAIN
PAIN_FUNCTIONAL_ASSESSMENT: NONE - DENIES PAIN

## 2023-01-25 ENCOUNTER — APPOINTMENT (OUTPATIENT)
Dept: ULTRASOUND IMAGING | Age: 63
DRG: 812 | End: 2023-01-25
Payer: MEDICARE

## 2023-01-25 LAB
ABSOLUTE EOS #: 0.05 K/UL (ref 0–0.4)
ABSOLUTE LYMPH #: 0.86 K/UL (ref 1–4.8)
ABSOLUTE MONO #: 0.14 K/UL (ref 0.1–1.3)
ANION GAP SERPL CALCULATED.3IONS-SCNC: 9 MMOL/L (ref 9–17)
BASOPHILS # BLD: 0 % (ref 0–2)
BASOPHILS ABSOLUTE: 0 K/UL (ref 0–0.2)
BUN BLDV-MCNC: 12 MG/DL (ref 8–23)
CALCIUM SERPL-MCNC: 7.4 MG/DL (ref 8.6–10.4)
CHLORIDE BLD-SCNC: 103 MMOL/L (ref 98–107)
CO2: 20 MMOL/L (ref 20–31)
CREAT SERPL-MCNC: 0.48 MG/DL (ref 0.7–1.2)
EOSINOPHILS RELATIVE PERCENT: 1 % (ref 0–4)
GFR SERPL CREATININE-BSD FRML MDRD: >60 ML/MIN/1.73M2
GLUCOSE BLD-MCNC: 94 MG/DL (ref 70–99)
HCT VFR BLD CALC: 17.8 % (ref 41–53)
HCT VFR BLD CALC: 22.4 % (ref 41–53)
HCT VFR BLD CALC: 25.9 % (ref 41–53)
HEMOGLOBIN: 5.7 G/DL (ref 13.5–17.5)
HEMOGLOBIN: 6.9 G/DL (ref 13.5–17.5)
HEMOGLOBIN: 8.3 G/DL (ref 13.5–17.5)
LYMPHOCYTES # BLD: 18 % (ref 24–44)
MCH RBC QN AUTO: 26 PG (ref 26–34)
MCH RBC QN AUTO: 26.1 PG (ref 26–34)
MCHC RBC AUTO-ENTMCNC: 30.6 G/DL (ref 31–37)
MCHC RBC AUTO-ENTMCNC: 32 G/DL (ref 31–37)
MCV RBC AUTO: 81.4 FL (ref 80–100)
MCV RBC AUTO: 85.4 FL (ref 80–100)
MONOCYTES # BLD: 3 % (ref 1–7)
MORPHOLOGY: ABNORMAL
PDW BLD-RTO: 20.3 % (ref 11.5–14.9)
PDW BLD-RTO: 21.7 % (ref 11.5–14.9)
PLATELET # BLD: 106 K/UL (ref 150–450)
PLATELET # BLD: 123 K/UL (ref 150–450)
PMV BLD AUTO: 7.2 FL (ref 6–12)
PMV BLD AUTO: 7.2 FL (ref 6–12)
POTASSIUM SERPL-SCNC: 4.1 MMOL/L (ref 3.7–5.3)
RBC # BLD: 2.19 M/UL (ref 4.5–5.9)
RBC # BLD: 2.62 M/UL (ref 4.5–5.9)
SEG NEUTROPHILS: 78 % (ref 36–66)
SEGMENTED NEUTROPHILS ABSOLUTE COUNT: 3.75 K/UL (ref 1.3–9.1)
SODIUM BLD-SCNC: 132 MMOL/L (ref 135–144)
WBC # BLD: 3.9 K/UL (ref 3.5–11)
WBC # BLD: 4.8 K/UL (ref 3.5–11)

## 2023-01-25 PROCEDURE — 80048 BASIC METABOLIC PNL TOTAL CA: CPT

## 2023-01-25 PROCEDURE — P9016 RBC LEUKOCYTES REDUCED: HCPCS

## 2023-01-25 PROCEDURE — 86900 BLOOD TYPING SEROLOGIC ABO: CPT

## 2023-01-25 PROCEDURE — 6370000000 HC RX 637 (ALT 250 FOR IP): Performed by: NURSE PRACTITIONER

## 2023-01-25 PROCEDURE — 99223 1ST HOSP IP/OBS HIGH 75: CPT | Performed by: INTERNAL MEDICINE

## 2023-01-25 PROCEDURE — 76705 ECHO EXAM OF ABDOMEN: CPT

## 2023-01-25 PROCEDURE — 85018 HEMOGLOBIN: CPT

## 2023-01-25 PROCEDURE — 6370000000 HC RX 637 (ALT 250 FOR IP)

## 2023-01-25 PROCEDURE — 2580000003 HC RX 258: Performed by: NURSE PRACTITIONER

## 2023-01-25 PROCEDURE — 99222 1ST HOSP IP/OBS MODERATE 55: CPT | Performed by: INTERNAL MEDICINE

## 2023-01-25 PROCEDURE — 2060000000 HC ICU INTERMEDIATE R&B

## 2023-01-25 PROCEDURE — 85027 COMPLETE CBC AUTOMATED: CPT

## 2023-01-25 PROCEDURE — 36430 TRANSFUSION BLD/BLD COMPNT: CPT

## 2023-01-25 PROCEDURE — 85014 HEMATOCRIT: CPT

## 2023-01-25 PROCEDURE — 36415 COLL VENOUS BLD VENIPUNCTURE: CPT

## 2023-01-25 RX ORDER — IBUPROFEN 400 MG/1
400 TABLET ORAL ONCE
Status: DISCONTINUED | OUTPATIENT
Start: 2023-01-25 | End: 2023-01-25

## 2023-01-25 RX ORDER — SPIRONOLACTONE 25 MG/1
TABLET ORAL
Status: COMPLETED
Start: 2023-01-25 | End: 2023-01-25

## 2023-01-25 RX ORDER — LANOLIN ALCOHOL/MO/W.PET/CERES
3 CREAM (GRAM) TOPICAL NIGHTLY PRN
Status: DISCONTINUED | OUTPATIENT
Start: 2023-01-25 | End: 2023-01-28 | Stop reason: HOSPADM

## 2023-01-25 RX ORDER — BUTALBITAL, ACETAMINOPHEN AND CAFFEINE 300; 40; 50 MG/1; MG/1; MG/1
1 CAPSULE ORAL EVERY 4 HOURS PRN
Status: DISCONTINUED | OUTPATIENT
Start: 2023-01-25 | End: 2023-01-28 | Stop reason: HOSPADM

## 2023-01-25 RX ORDER — SPIRONOLACTONE 25 MG/1
25 TABLET ORAL DAILY
Status: DISCONTINUED | OUTPATIENT
Start: 2023-01-25 | End: 2023-01-28 | Stop reason: HOSPADM

## 2023-01-25 RX ADMIN — PANTOPRAZOLE SODIUM 40 MG: 40 TABLET, DELAYED RELEASE ORAL at 08:18

## 2023-01-25 RX ADMIN — SODIUM CHLORIDE, PRESERVATIVE FREE 10 ML: 5 INJECTION INTRAVENOUS at 08:19

## 2023-01-25 RX ADMIN — SODIUM CHLORIDE, PRESERVATIVE FREE 10 ML: 5 INJECTION INTRAVENOUS at 19:00

## 2023-01-25 RX ADMIN — SPIRONOLACTONE 25 MG: 25 TABLET ORAL at 09:08

## 2023-01-25 RX ADMIN — BUTALBITA,ACETAMINOPHEN AND CAFFEINE 1 CAPSULE: 50; 300; 40 CAPSULE ORAL at 20:27

## 2023-01-25 RX ADMIN — ONDANSETRON 4 MG: 4 TABLET, ORALLY DISINTEGRATING ORAL at 03:10

## 2023-01-25 RX ADMIN — Medication 3 MG: at 01:44

## 2023-01-25 RX ADMIN — SODIUM CHLORIDE, PRESERVATIVE FREE 10 ML: 5 INJECTION INTRAVENOUS at 00:17

## 2023-01-25 RX ADMIN — BUTALBITA,ACETAMINOPHEN AND CAFFEINE 1 CAPSULE: 50; 300; 40 CAPSULE ORAL at 04:36

## 2023-01-25 RX ADMIN — ATORVASTATIN CALCIUM 20 MG: 20 TABLET, FILM COATED ORAL at 20:27

## 2023-01-25 RX ADMIN — ATORVASTATIN CALCIUM 20 MG: 20 TABLET, FILM COATED ORAL at 01:44

## 2023-01-25 RX ADMIN — FUROSEMIDE 40 MG: 40 TABLET ORAL at 17:40

## 2023-01-25 RX ADMIN — BUTALBITA,ACETAMINOPHEN AND CAFFEINE 1 CAPSULE: 50; 300; 40 CAPSULE ORAL at 08:17

## 2023-01-25 RX ADMIN — ONDANSETRON 4 MG: 4 TABLET, ORALLY DISINTEGRATING ORAL at 20:27

## 2023-01-25 RX ADMIN — Medication 3 MG: at 20:27

## 2023-01-25 RX ADMIN — SODIUM CHLORIDE: 9 INJECTION, SOLUTION INTRAVENOUS at 00:16

## 2023-01-25 RX ADMIN — FERROUS SULFATE TAB 325 MG (65 MG ELEMENTAL FE) 325 MG: 325 (65 FE) TAB at 08:18

## 2023-01-25 RX ADMIN — ONDANSETRON 4 MG: 4 TABLET, ORALLY DISINTEGRATING ORAL at 09:47

## 2023-01-25 RX ADMIN — FERROUS SULFATE TAB 325 MG (65 MG ELEMENTAL FE) 325 MG: 325 (65 FE) TAB at 17:40

## 2023-01-25 RX ADMIN — FUROSEMIDE 40 MG: 40 TABLET ORAL at 08:18

## 2023-01-25 RX ADMIN — LACTULOSE 20 G: 20 SOLUTION ORAL at 08:19

## 2023-01-25 ASSESSMENT — ENCOUNTER SYMPTOMS
WHEEZING: 0
NAUSEA: 0
TROUBLE SWALLOWING: 0
ABDOMINAL DISTENTION: 1
DIARRHEA: 0
VOICE CHANGE: 0
CONSTIPATION: 0
SORE THROAT: 0
COUGH: 0
BACK PAIN: 0
CHOKING: 0
VOMITING: 0
APNEA: 0
BLOOD IN STOOL: 0
SHORTNESS OF BREATH: 0

## 2023-01-25 ASSESSMENT — PAIN SCALES - GENERAL
PAINLEVEL_OUTOF10: 8
PAINLEVEL_OUTOF10: 9
PAINLEVEL_OUTOF10: 8
PAINLEVEL_OUTOF10: 9

## 2023-01-25 ASSESSMENT — PAIN DESCRIPTION - LOCATION
LOCATION: HEAD

## 2023-01-25 ASSESSMENT — PAIN DESCRIPTION - DESCRIPTORS: DESCRIPTORS: PRESSURE;DISCOMFORT

## 2023-01-25 NOTE — ED TRIAGE NOTES
Mode of arrival (squad #, walk in, police, etc) : walk-in        Chief complaint(s): abnormal hemoglobin        Arrival Note (brief scenario, treatment PTA, etc). : Pt reports that he chronically has low hemoglobin and has to receive transfusions. Pt had TIPS insertion and reports that it affects his values. States, \"I have blood work done every week and it was 6.0 so they told me to come in. The last time this happened, they just adjusted something, opened the port or something and it got better. \"        C= \"Have you ever felt that you should Cut down on your drinking? \"  No  A= \"Have people Annoyed you by criticizing your drinking? \"  No  G= \"Have you ever felt bad or Guilty about your drinking? \"  No  E= \"Have you ever had a drink as an Eye-opener first thing in the morning to steady your nerves or to help a hangover? \"  No      Deferred []      Reason for deferring: N/A    *If yes to two or more: probable alcohol abuse. *

## 2023-01-25 NOTE — PROGRESS NOTES
Patient called out reporting a headache. Writer offered patient ordered tylenol to which the patient states \"I only take morphine or fentanyl\". Patient educated that tylenol is the medication ordered for his headache. Patient also stated that it is due to his liver function. Writer notified NUBIA Jarrett NP. Awaiting a response at this time. 0214: orders placed by  NP for [place].

## 2023-01-25 NOTE — PROGRESS NOTES
Patient received from ED via wheelchair. Patient ambulated to bed. Vitals taken. Assessment to follow. No distress noted. See doc flowsheet and transfer navigator for details. POC and education reviewed with patient. Call light within reach, and pt educated on its use. Bed in lowest position, and locked. Side rails up x 2. Denied further questions or needs at this time. Will continue to monitor.

## 2023-01-25 NOTE — CONSENT
Informed Consent for Blood Component Transfusion Note    I have discussed with the patient the rationale for blood component transfusion; its benefits in treating or preventing fatigue, organ damage, or death; and its risk which includes mild transfusion reactions, rare risk of blood borne infection, or more serious but rare reactions. I have discussed the alternatives to transfusion, including the risk and consequences of not receiving transfusion. The patient had an opportunity to ask questions and had agreed to proceed with transfusion of blood components.     Electronically signed by Tavo Hooper MD on 1/24/23 at 10:28 PM EST

## 2023-01-25 NOTE — PLAN OF CARE
THE [facility] AT [facility]   Plan of Care Note    Kwasi Salgado is a 60 y.o. male patient. Hospitalization Day:1    Briefly:    69-year-old male well-known to our practice presents to ED for abnormal labs, low hgb. PMHX includes alcoholic cirrhosis s/p TIPS, hx EV with banding, PHG, abdominal ascites requiring weekly LVP, hx Lezama's, esophageal cancer with chemoradiation in the past, HTN. Patient was recently admitted to the hospital for melena and subsequently had EGD revealing large EV and GV, PHG. Following this patient had TIPS evaulation by Dr. Tucker Salazar with stent PTA'd to 10 mm. The L gastric vein was embolized. Posterior gastric venography did not show filling of gastric varices. He then had repeat EGD with Dr. Wendy Mccall showing grade 1-2 varices in the distal esophagus without stigmata of bleeding, few AVMs in the cardia, thickened gastric folds but no evidence of varices, and mild diffuse portal hypertensive gastropathy. Hgb 5.7 on admission. Patient had large BM during evaluation. Does not have melena. No encephalopathy. Abdomen softly distended  BS active  Has chronic SOB  Denies any chest pain, abdominal pain, nausea, vomiting. No melena, hematochezia. Reports following salt restricted diet at home. He reports taking his medications as directed. Does continue to drink beer, typically on the weekends. Review of Systems   Constitutional:  Negative for appetite change, fatigue and fever. HENT:  Negative for mouth sores, sore throat, trouble swallowing and voice change. Eyes:         No ictirus   Respiratory:  Negative for apnea, cough, choking, shortness of breath and wheezing. Cardiovascular:  Negative for chest pain, palpitations and leg swelling. Gastrointestinal:  Positive for abdominal distention. Negative for blood in stool, constipation, diarrhea, nausea and vomiting. Endocrine: Negative for polydipsia, polyphagia and polyuria.    Genitourinary:  Negative for frequency, hematuria and urgency. Musculoskeletal:  Positive for arthralgias. Negative for back pain, gait problem and joint swelling. Skin:  Negative for pallor and rash. Allergic/Immunologic: Negative for food allergies. Neurological:  Positive for weakness. Negative for dizziness, seizures and headaches. Hematological:  Negative for adenopathy. Does not bruise/bleed easily. Psychiatric/Behavioral:  Negative for sleep disturbance. The patient is not nervous/anxious. Physical Exam  Vitals and nursing note reviewed. Constitutional:       General: He is not in acute distress. Appearance: He is well-developed. HENT:      Head: Normocephalic and atraumatic. Mouth/Throat:      Pharynx: No oropharyngeal exudate. Eyes:      General: No scleral icterus. Conjunctiva/sclera: Conjunctivae normal.      Pupils: Pupils are equal, round, and reactive to light. Neck:      Thyroid: No thyromegaly. Trachea: No tracheal deviation. Cardiovascular:      Rate and Rhythm: Normal rate and regular rhythm. Heart sounds: Normal heart sounds. No murmur heard. Pulmonary:      Effort: Pulmonary effort is normal. No respiratory distress. Breath sounds: Normal breath sounds. No wheezing or rales. Abdominal:      General: Bowel sounds are normal. There is no distension. Palpations: Abdomen is soft. There is no hepatomegaly or mass. Tenderness: There is no abdominal tenderness. There is no guarding or rebound. Hernia: No hernia is present. Comments: Softly distended   Musculoskeletal:      Cervical back: Normal range of motion and neck supple. Lymphadenopathy:      Cervical: No cervical adenopathy. Skin:     General: Skin is warm and dry. Findings: No erythema or rash. Neurological:      Mental Status: He is alert and oriented to person, place, and time. Cranial Nerves: No cranial nerve deficit. Psychiatric:         Thought Content:  Thought content normal. Data Review:  LABS and IMAGING:     CBC  Recent Labs     01/24/23  1515 01/24/23 2200 01/25/23  0521   WBC  --  4.8 3.9   HGB 6.4* 5.7* 6.9*   MCV  --  81.4 85.4   RDW  --  21.7* 20.3*   PLT  --  123* 106*       ANEMIA STUDIES  No results for input(s): LABIRON, TIBC, FERRITIN, [id number], FOLATE, OCCULTBLD in the last 72 hours. BMP  Recent Labs     01/24/23 2200 01/25/23  0521   * 132*   K 4.2 4.1    103   CO2 23 20   BUN 13 12   CREATININE 0.53* 0.48*   GLUCOSE 112* 94   CALCIUM 7.7* 7.4*       LFTS  Recent Labs     01/24/23 2200   ALKPHOS 197*   ALT 25   AST 69*   BILITOT 1.5*   BILIDIR 0.9*   LABALBU 2.4*       AMYLASE/LIPASE/AMMONIA  Recent Labs     01/24/23 2200 01/24/23  2240   LIPASE 34  --    AMMONIA  --  34       Radiology Review:    US LIVER    Result Date: 12/28/2022  EXAMINATION: RIGHT UPPER QUADRANT ULTRASOUND 12/28/2022 4:16 pm COMPARISON: CT abdomen 12/27/2022 HISTORY: ORDERING SYSTEM PROVIDED HISTORY: check patency of TIPS FINDINGS: LIVER:  The liver demonstrates heterogeneous, multinodular pattern echogenicity without dominant hepatic lesion demonstrated sonographically. No evidence of intrahepatic biliary ductal dilatation. TIPS: Duplex and Doppler waveform imaging of the tips was performed with the following flow velocities: Distal 30.6 cm/second, mid 29.1 cm/second, proximal 33.6 cm/second. BILIARY SYSTEM:  Gallbladder demonstrates a number of layering, echogenic shadowing stones. No wall thickening evident. Negative sonographic Silva's sign. Common bile duct is within normal limits measuring 3 mm. RIGHT KIDNEY: The right kidney is grossly unremarkable without evidence of hydronephrosis. Right kidney long dimension 11.2 cm. PANCREAS:  Visualized portions of the pancreas are unremarkable. OTHER: Small volume right upper quadrant ascites. 1.  Cholelithiasis without definite findings of acute cholecystitis. 2. Patent TIPS. Specific velocities recorded above.  3. Small volume abdominal ascites. 4. Hepatic morphologic features typical of cirrhosis. Principal Problem:    Symptomatic anemia, microcytic, acute  Active Problems:    Ascites due to alcoholic cirrhosis ([address]  Resolved Problems:    * No resolved hospital problems. *       GI Assessment:    Decompensated alcoholic cirrhosis s/p TIPS revision 12/30 with upsizing stent and embolization left gastric vein  Severe anemia  EV  PHG  Refractory ascites    Plan of care:  No overt GI bleeding at present time  Continue to monitor  Transfuse for hgb < 7  US liver to evaluate TIPS  2 gm sodium diet  Lactulose TID, titrate to 3-4 BM daily  Daily labs including CBC, CMP  Diuretics as ordered  Supportive care      Complete consult note and plan of care to follow per     Thank you for allowing me to participate in the care of your patient. Please feel free to contact me with any questions or concerns.      Rosalinda Cabrera, [address] [facility]  [phone]

## 2023-01-25 NOTE — H&P
History and Physical Service  [facility] Internal Medicine    HISTORY AND PHYSICAL EXAMINATION            Date:   1/25/2023  Patient name:  Breanne Garcia  MRN:   [id number]  Account:  [de-identified]  YOB: 1960  PCP:    VASU Mesa  Code Status:    Full Code    Chief Complaint:     Chief Complaint   Patient presents with    Abnormal Lab     Pt reports a hemoglobin of 6.0         History Obtained From:     Patient, EMR, nursing staff    HPI     This patient is a 60 y.o. Non- / non  malewho presents with    According to patient, he had outpatient lab work completed today and received a call from his PCP instructing him to come to the ED for hemoglobin 6.4. Repeat hemoglobin obtained in ED 5.7. Patient reports that he had a TIPS procedure completed in November for treatment of cirrhosis and states that he was hospitalized for a week in December due to bleeding from his TIPS. Symptoms are associated with shortness of breath with activity, which has been present since having pneumonia. Denies fever, chills, chest pain, cough, abdominal pain, nausea, vomiting, diarrhea, and urinary symptoms. Denies hematochezia, melena, hematuria, and all other obvious signs of bleeding. Declined REBECCA in ED. States that stools are brown as usual.  There are no aggravating or alleviating factors. Symptoms are reported as Constant and moderate    Review of Systems:   Positive for generalized fatigue  Denies any shortness of breath or cough  Denies chest pain or palpitations  Denies abdominal pain, diarrhea vomiting  Denies any new numbness tremors or weakness. A 10 point review of systems was performed and and negative except as mentioned in HPI  Positive and Negative as described in HPI.       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 Gallito Medina, orthopedic, every 3-4 months, gets steroid injection    Lezama esophagus     Bleeding gastric varices 12/29/2022    BPH (benign prostatic hypertrophy)     Cholelithiasis     Cirrhosis ([address]     COVID-19 12/2020    pt reports he had a positive test while at [facility] in 2020, was asymptomatic    COVID-19 vaccine series completed 5/20/2021, 6/22/2021    Moderna 5/20/2021, 6/22/2021    DDD (degenerative disc disease), lumbar     Depression     Esophageal cancer ([address]     INVASIVE ADENOCARCINOMA ARISING IN TUBULAR ADENOMA WITH HIGH GRADE DYSPLASIA, ASSOCIATED WITH FOCAL INTESTINAL METAPLASIA     Esophageal varices ([address]     Fatty liver     GERD (gastroesophageal reflux disease)     GI bleed     Hep C w/o coma, chronic ([address]     History of alcohol abuse     6-12 beers a day; quit drinking 2019    History of blood transfusion     History of colon polyps 2016    History of tobacco abuse     [place] (hard of hearing)     Hyperlipidemia     Hypertension     Hyponatremia 07/20/2016    Hypotension 12/20/2021    Mastoid disorder, bilateral 12/31/2022    biLateral mastoid effusion    PONV (postoperative nausea and vomiting)     Port-A-Cath in place     right upper chest    Portal hypertension (HCC)     Sciatica     Secondary esophageal varices ([address] 06/07/2022    Shortness of breath     Spinal stenosis     Stomach ulcer     hx of    Thrombocytopenia ([address] 12/23/2020    Tubular adenoma of colon 2016, 2018    Vitamin D deficiency     Wears glasses         Past Surgical History:     Past Surgical History:   Procedure Laterality Date    BUNIONECTOMY      twice on right side    BUNIONECTOMY Left     CARPAL TUNNEL RELEASE Right     COLONOSCOPY      at age 35    COLONOSCOPY  10/05/2016    polyps-pathology tubular adenoma, and abnormal looking mucosa right colon-pathology-tubular adenoma    COLONOSCOPY N/A 03/30/2018    COLONOSCOPY POLYPECTOMY COLD BIOPSY performed by Elsie Ganser, MD at [address]  03/30/2018    Small polyp in the sigmoid colon and excised with biopsy forceps--tubular adenoma    COLONOSCOPY N/A 04/16/2022    COLONOSCOPY POLYPECTOMY performed by Hallie Lozano MD at [address] [facility], DIAGNOSTIC      EGD    ESOPHAGOGASTRODUODENOSCOPY  12/29/2022    ESOPHAGOGASTRODUODENOSCOPY N/A 01/03/2023    IR PORT PLACEMENT EQUAL OR GREATER THAN 5 YEARS  04/19/2021    IR PORT PLACEMENT EQUAL OR GREATER THAN 5 YEARS 4/19/2021 [facility]    IR TIPS INSERTION  12/01/2022    IR TIPS INSERTION 12/1/2022 Alejandra Mora MD [facility]    KNEE SURGERY Left     cyst removed    NASAL SEPTUM SURGERY      NERVE BLOCK Right 11/23/2020    NERVE BLOCK RIGHT CERVICAL STEROID INJECTION  C3-C6 performed by Juani López MD at [place] [address]  01/04/2016    steroid injection C7 T1    OTHER SURGICAL HISTORY  11/21/2016    Bilateral Lumbar CACHORRO L4-L5 injections    OTHER SURGICAL HISTORY  12/19/2016    lumbar steroid injection    OTHER SURGICAL HISTORY  09/28/2018    BILATERAL L5 CACHORRO (N/A Back)    OTHER SURGICAL HISTORY Right 11/23/2020    cervical injection    PAIN MANAGEMENT PROCEDURE Left 07/09/2020    EPIDURAL STEROID INJECTION LEFT L4 L5 performed by Juani López MD at [address] Left 07/20/2020    LEFT L4 L5 EPIDURAL STEROID INJECTION performed by Juani López MD at [address] Bilateral 08/17/2020    LUMBAR FACET BILATERAL L2-L5 performed by Juani López MD at [address] Bilateral 12/07/2020    NERVE BLOCK BILATERAL LUMBAR MEDIAL BRANCH L2-L5 performed by Juani López MD at [facility] NEUROPLASTY &/TRANSPOS MEDIAN NRV CARPAL Rulon Mussel Right 08/29/2017    CARPAL TUNNEL RELEASE RIGHT performed by Brigida Victor MD at [address] [facility] [employer] &/TRANSPOS MEDIAN NRV CARPAL TUNNE Left 10/31/2017    CARPAL TUNNEL RELEASE performed by Sheree Faust MD at [place] [id number] AA&/STRD TFRML EPI LUMBAR/SACRAL 1 LEVEL Bilateral 09/06/2018    BILATERAL L5 CACHORRO performed by Grupo Yee MD at [facility] [address] AA&/STRD TFRML EPI LUMBAR/SACRAL 1 LEVEL N/A 09/28/2018    BILATERAL L5 CACHORRO performed by Grupo Yee MD at [address] [place] [address] 12/29/2020    EGD BIOPSY performed by Lauren Preciado MD at [address] [place] [address] 02/02/2021    EGD BIOPSY and spot marking performed by Anastasia Tamayo MD at [address] [place] [address] 02/12/2021    ENDOSCOPIC ULTRASOUND, EGD performed by Krystle Spence MD at [facility] 1  02/12/2021    EGD DIAGNOSTIC ONLY performed by Krystle Spence MD at [facility] 1 N/A 08/31/2021    EGD BIOPSY performed by Anastasia Tamayo MD at [address] [place] [address] 01/21/2022    EGD BIOPSY performed by Anastasia Tamayo MD at [address] [place] [address] N/A 04/15/2022    EGD ESOPHAGOGASTRODUODENOSCOPY performed by Lauren Preciado MD at [address] [place] [address] 06/06/2022    EGD BAND LIGATION performed by Lit Hartmann MD at [address] [place] [address] N/A 06/09/2022    EGD ESOPHAGOGASTRODUODENOSCOPY performed by Lit Hartmann MD at [address] [place] [address] N/A 09/14/2022    EGD ESOPHAGOGASTRODUODENOSCOPY ENDOSCOPIC APC AT GE JUNCTION performed by Linda Luke MD at [address] [place] [address] 10/19/2022    EGD BIOPSY performed by Anastasia Tamayo MD at [address] [place] [address] 10/31/2022    EGD with APC performed by Anastasia Tamayo MD at [address] [place] [address]  12/29/2022    EGD ESOPHAGOGASTRODUODENOSCOPY performed by Krystle Spence MD at [address] [place] [address] ENDOSCOPY N/A 1/3/2023    EGD ESOPHAGOGASTRODUODENOSCOPY performed by Jesus Hernandez MD at [address] [place] [address]          Medications Prior to Admission:     Prior to Admission medications    Medication Sig Start Date End Date Taking? Authorizing Provider   lactulose (CHRONULAC) 10 GM/15ML solution take 30 milliliters by mouth three times a day 1/17/23   VASU Aleman   furosemide (LASIX) 40 MG tablet Take 1 tablet by mouth in the morning and 1 tablet in the evening. 1/11/23   Cornel Huang DO   spironolactone (ALDACTONE) 25 MG tablet Take 1 tablet by mouth daily 1/11/23   Cornel Huang DO   pantoprazole (PROTONIX) 40 MG tablet Take 40 mg by mouth daily 10/4/22   Historical Provider, MD   FEROSBRAN 325 (65 Fe) MG tablet take 1 tablet by mouth twice a day 10/7/22   VASU Aleman   atorvastatin (LIPITOR) 20 MG tablet Take 1 tablet by mouth nightly 4/21/22   Cassandra James MD        Allergies:     Patient has no known allergies. Social History:     Tobacco:    reports that he quit smoking about 6 years ago. His smoking use included cigarettes. He has a 45.00 pack-year smoking history. He has never used smokeless tobacco.  Alcohol:      reports that he does not currently use alcohol. Drug Use:  reports that he does not currently use drugs after having used the following drugs: Cocaine. Frequency: 1.00 time per week. Family History:     Family History   Problem Relation Age of Onset    Cancer Mother         pancreatic    Cancer Father         bone    Diabetes Sister     Asthma Brother     Allergies Brother         MULTIPLE           Physical Exam:   /62   Pulse 80   Temp 97.9 °F (36.6 °C) (Oral)   Resp 18   Ht 5' 10\" (1.778 m)   Wt 210 lb (95.3 kg)   SpO2 100%   BMI 30.13 kg/m²   No results for input(s): POCGLU in the last 72 hours.     General Appearance:  alert, well appearing, and in no acute distress  Mental status: oriented to person, place, and time with normal affect  Head:  normocephalic, atraumatic. Eye: no icterus, redness, pupils equal and reactive, extraocular eye movements intact, conjunctiva clear  Ear: normal external ear, no discharge, hearing intact  Nose:  no drainage noted  Mouth: mucous membranes moist  Neck: supple, no carotid bruits, thyroid not palpable  Lungs: Bilateral equal air entry, clear to ausculation, no wheezing, rales or rhonchi, normal effort  Cardiovascular: normal rate, regular rhythm, no murmur, gallop, rub.   Abdomen: Soft, nontender, ascitic distention present   Neurologic: There are no new focal motor or sensory deficits, normal muscle tone and bulk, no abnormal sensation, normal speech, cranial nerves II through XII grossly intact  Skin: No gross lesions, rashes, bruising or bleeding on exposed skin area  Extremities:  peripheral pulses palpable, grade 1 bilateral pedal edema no calf pain with palpation  Psych: normal affect     Investigations:      Laboratory Testing:  Recent Results (from the past 24 hour(s))   Hemoglobin and Hematocrit    Collection Time: 01/24/23  3:15 PM   Result Value Ref Range    Hemoglobin 6.4 (LL) 13.5 - 17.5 g/dL    Hematocrit 20.6 (L) 41 - 53 %   TYPE AND SCREEN    Collection Time: 01/24/23  8:00 PM   Result Value Ref Range    Expiration Date 01/27/2023,2359     Arm Band Number [id number]     ABO/Rh AB POSITIVE     Antibody Screen NEGATIVE     Unit Number [id number]     Product Code Leukocyte Reduced Red Cell     Unit Divison 00     Dispense Status ISSUED     Unit Issue Date/Time 860909688159     Product Code Blood Bank R5020M36     Blood Bank Unit Type and Rh B NEG     Blood Bank ISBT Product Blood Type 1700     Blood Bank Blood Product Expiration Date 698993101991     Transfusion Status OK TO TRANSFUSE     Crossmatch Result COMPATIBLE     Unit Number [id number]     Product Code Leukocyte Reduced Red Cell     Unit Divison 00     Dispense Status ISSUED     Unit Issue Date/Time 689468940860     Product Code Blood Bank I6914F95     Blood Bank Unit Type and Rh B POS     Blood Bank ISBT Product Blood Type 7300     Blood Bank Blood Product Expiration Date 670724650141     Transfusion Status OK TO TRANSFUSE     Crossmatch Result COMPATIBLE    CBC with Auto Differential    Collection Time: 01/24/23 10:00 PM   Result Value Ref Range    WBC 4.8 3.5 - 11.0 k/uL    RBC 2.19 (L) 4.5 - 5.9 m/uL    Hemoglobin 5.7 (LL) 13.5 - 17.5 g/dL    Hematocrit 17.8 (L) 41 - 53 %    MCV 81.4 80 - 100 fL    MCH 26.0 26 - 34 pg    MCHC 32.0 31 - 37 g/dL    RDW 21.7 (H) 11.5 - 14.9 %    Platelets 709 (L) 512 - 450 k/uL    MPV 7.2 6.0 - 12.0 fL    Seg Neutrophils 78 (H) 36 - 66 %    Lymphocytes 18 (L) 24 - 44 %    Monocytes 3 1 - 7 %    Eosinophils % 1 0 - 4 %    Basophils 0 0 - 2 %    Segs Absolute 3.75 1.3 - 9.1 k/uL    Absolute Lymph # 0.86 (L) 1.0 - 4.8 k/uL    Absolute Mono # 0.14 0.1 - 1.3 k/uL    Absolute Eos # 0.05 0.0 - 0.4 k/uL    Basophils Absolute 0.00 0.0 - 0.2 k/uL    Morphology ANISOCYTOSIS PRESENT    BMP    Collection Time: 01/24/23 10:00 PM   Result Value Ref Range    Glucose 112 (H) 70 - 99 mg/dL    BUN 13 8 - 23 mg/dL    Creatinine 0.53 (L) 0.70 - 1.20 mg/dL    Est, Glom Filt Rate >60 >60 mL/min/1.73m2    Calcium 7.7 (L) 8.6 - 10.4 mg/dL    Sodium 131 (L) 135 - 144 mmol/L    Potassium 4.2 3.7 - 5.3 mmol/L    Chloride 102 98 - 107 mmol/L    CO2 23 20 - 31 mmol/L    Anion Gap 6 (L) 9 - 17 mmol/L   Lipase    Collection Time: 01/24/23 10:00 PM   Result Value Ref Range    Lipase 34 13 - 60 U/L   Magnesium    Collection Time: 01/24/23 10:00 PM   Result Value Ref Range    Magnesium 1.7 1.6 - 2.6 mg/dL   Protime-INR    Collection Time: 01/24/23 10:00 PM   Result Value Ref Range    Protime 17.0 (H) 11.8 - 14.6 sec    INR 1.4    Hepatic Function Panel    Collection Time: 01/24/23 10:00 PM   Result Value Ref Range    Albumin 2.4 (L) 3.5 - 5.2 g/dL    Alkaline Phosphatase 197 (H) 40 - 129 U/L    ALT 25 5 - 41 U/L    AST 69 (H) <40 U/L Total Bilirubin 1.5 (H) 0.3 - 1.2 mg/dL    Bilirubin, Direct 0.9 (H) <0.3 mg/dL    Bilirubin, Indirect 0.6 0.0 - 1.0 mg/dL    Total Protein 5.0 (L) 6.4 - 8.3 g/dL   ETOH    Collection Time: 01/24/23 10:00 PM   Result Value Ref Range    Ethanol 252 (H) <10 mg/dL    Ethanol percent 0.252 %   Ammonia    Collection Time: 01/24/23 10:40 PM   Result Value Ref Range    Ammonia 34 16 - 60 umol/L   Basic Metabolic Panel w/ Reflex to MG    Collection Time: 01/25/23  5:21 AM   Result Value Ref Range    Glucose 94 70 - 99 mg/dL    BUN 12 8 - 23 mg/dL    Creatinine 0.48 (L) 0.70 - 1.20 mg/dL    Est, Glom Filt Rate >60 >60 mL/min/1.73m2    Calcium 7.4 (L) 8.6 - 10.4 mg/dL    Sodium 132 (L) 135 - 144 mmol/L    Potassium 4.1 3.7 - 5.3 mmol/L    Chloride 103 98 - 107 mmol/L    CO2 20 20 - 31 mmol/L    Anion Gap 9 9 - 17 mmol/L   CBC    Collection Time: 01/25/23  5:21 AM   Result Value Ref Range    WBC 3.9 3.5 - 11.0 k/uL    RBC 2.62 (L) 4.5 - 5.9 m/uL    Hemoglobin 6.9 (LL) 13.5 - 17.5 g/dL    Hematocrit 22.4 (L) 41 - 53 %    MCV 85.4 80 - 100 fL    MCH 26.1 26 - 34 pg    MCHC 30.6 (L) 31 - 37 g/dL    RDW 20.3 (H) 11.5 - 14.9 %    Platelets 051 (L) 079 - 450 k/uL    MPV 7.2 6.0 - 12.0 fL       Recent Labs     01/25/23  0521 01/24/23  2200 01/02/23  1648 01/02/23  1055   HGB 6.9* 5.7*   < >  --    HCT 22.4* 17.8*   < >  --    WBC 3.9 4.8   < >  --    MCV 85.4 81.4   < >  --    * 131*   < >  --    K 4.1 4.2   < >  --     102   < >  --    CO2 20 23   < >  --    BUN 12 13   < >  --    CREATININE 0.48* 0.53*   < >  --    GLUCOSE 94 112*   < >  --    INR  --  1.4   < > 1.4   PROTIME  --  17.0*   < > 14.7*   APTT  --   --   --  35.9*   AST  --  69*   < >  --    ALT  --  25   < >  --    LABALBU  --  2.4*   < >  --     < > = values in this interval not displayed.        Hematology:  Recent Labs     01/24/23  1515 01/24/23  2200 01/25/23  0521   WBC  --  4.8 3.9   RBC  --  2.19* 2.62*   HGB 6.4* 5.7* 6.9*   HCT 20.6* 17.8* 22.4* MCV  --  81.4 85.4   MCH  --  26.0 26.1   MCHC  --  32.0 30.6*   RDW  --  21.7* 20.3*   PLT  --  123* 106*   MPV  --  7.2 7.2   INR  --  1.4  --      Chemistry:  Recent Labs     01/24/23 2200 01/25/23  0521   * 132*   K 4.2 4.1    103   CO2 23 20   GLUCOSE 112* 94   BUN 13 12   CREATININE 0.53* 0.48*   MG 1.7  --    ANIONGAP 6* 9   LABGLOM >60 >60   CALCIUM 7.7* 7.4*     Recent Labs     01/24/23 2200 01/24/23  2240   PROT 5.0*  --    LABALBU 2.4*  --    AST 69*  --    ALT 25  --    ALKPHOS 197*  --    BILITOT 1.5*  --    BILIDIR 0.9*  --    AMMONIA  --  34   LIPASE 34  --        Imaging/Diagnostics:       XR CHEST (SINGLE VIEW FRONTAL)    Result Date: 12/27/2022  EXAMINATION: ONE XRAY VIEW OF THE CHEST 12/27/2022 5:26 pm COMPARISON: Chest radiographs on 12/09/2022 HISTORY: ORDERING SYSTEM PROVIDED HISTORY: eval for effusion TECHNOLOGIST PROVIDED HISTORY: eval for effusion Reason for Exam: eval for effusion FINDINGS: A right IJ port catheter tip is in the superior vena cava. There is mild bibasilar atelectasis with trace bilateral pleural fluid. No pneumothorax is identified. There is calcification at the aortic arch. 1. Minimal bibasilar atelectatic changes with trace left pleural fluid which is slightly decreased from 12/09/2022. XR RADIUS ULNA RIGHT (2 VIEWS)    Result Date: 1/1/2023  EXAMINATION: TWO XRAY VIEWS OF THE RIGHT FOREARM 1/1/2023 5:50 pm COMPARISON: None. HISTORY: ORDERING SYSTEM PROVIDED HISTORY: swelling R forearm TECHNOLOGIST PROVIDED HISTORY: swelling R forearm FINDINGS: No fracture, dislocation, or focal osseous lesion is noted. Mild volar soft tissue swelling. No fracture or dislocation.   Mild soft tissue swelling     CT HEAD WO CONTRAST    Result Date: 12/31/2022  EXAMINATION: CT OF THE HEAD WITHOUT CONTRAST; CT OF THE CERVICAL SPINE WITHOUT CONTRAST 12/31/2022 1:58 am TECHNIQUE: CT of the head was performed without the administration of intravenous contrast. Automated exposure control, iterative reconstruction, and/or weight based adjustment of the mA/kV was utilized to reduce the radiation dose to as low as reasonably achievable.; CT of the cervical spine was performed without the administration of intravenous contrast. Multiplanar reformatted images are provided for review. Automated exposure control, iterative reconstruction, and/or weight based adjustment of the mA/kV was utilized to reduce the radiation dose to as low as reasonably achievable. COMPARISON: 12/07/2021 MRI cervical spine. 09/30/2020 MRI brain. HISTORY: ORDERING SYSTEM PROVIDED HISTORY: reduced mentation post fall R/O intracranial bleed TECHNOLOGIST PROVIDED HISTORY: reduced mentation post fall R/O intracranial bleed; ORDERING SYSTEM PROVIDED HISTORY: unwitnessed ground level fall. TECHNOLOGIST PROVIDED HISTORY: unwitnessed ground level fall. FINDINGS: CT BRAIN: BRAIN/VENTRICLES: There is no acute intracranial hemorrhage, mass effect or midline shift. No abnormal extra-axial fluid collection. Involutional parenchymal changes. Age-indeterminate (likely chronic) left basal ganglia lacunar infarct. No large territorial hypodensity. There is no evidence of hydrocephalus. Stable 0.6 cm peripherally calcified cystic pineal lesion, likely an incidental pineal cyst. ORBITS: The visualized portion of the orbits demonstrate no acute abnormality. SINUSES: The visualized paranasal sinuses and mastoid air cells demonstrate no acute abnormality. SOFT TISSUES/SKULL:  No acute abnormality of the visualized skull or soft tissues. CT CERVICAL SPINE: BONES/ALIGNMENT: There is no acute fracture or traumatic malalignment. DEGENERATIVE CHANGES: Multilevel cervical spondylosis, most notable at C5-C6 and C6-C7. No high-grade bony spinal canal stenosis. SOFT TISSUES: There is no prevertebral soft tissue swelling. Partially visualized right IJ approach chest port. CT BRAIN: 1.  No evidence of acute hemorrhage, large territorial hypodensity, significant mass effect/midline shift, or ventriculomegaly 2. Involutional parenchymal changes. 3. Age-indeterminate (likely chronic) left basal ganglia lacunar infarct. If clinically indicated, can consider further evaluation with MRI if no contraindications. CT CERVICAL SPINE: 1. No acute abnormality of the cervical spine. 2. Multilevel cervical spondylosis, most notable at C5-C6 and C6-C7. No high-grade bony spinal canal stenosis. CT CERVICAL SPINE WO CONTRAST    Result Date: 12/31/2022  EXAMINATION: CT OF THE HEAD WITHOUT CONTRAST; CT OF THE CERVICAL SPINE WITHOUT CONTRAST 12/31/2022 1:58 am TECHNIQUE: CT of the head was performed without the administration of intravenous contrast. Automated exposure control, iterative reconstruction, and/or weight based adjustment of the mA/kV was utilized to reduce the radiation dose to as low as reasonably achievable.; CT of the cervical spine was performed without the administration of intravenous contrast. Multiplanar reformatted images are provided for review. Automated exposure control, iterative reconstruction, and/or weight based adjustment of the mA/kV was utilized to reduce the radiation dose to as low as reasonably achievable. COMPARISON: 12/07/2021 MRI cervical spine. 09/30/2020 MRI brain. HISTORY: ORDERING SYSTEM PROVIDED HISTORY: reduced mentation post fall R/O intracranial bleed TECHNOLOGIST PROVIDED HISTORY: reduced mentation post fall R/O intracranial bleed; ORDERING SYSTEM PROVIDED HISTORY: unwitnessed ground level fall. TECHNOLOGIST PROVIDED HISTORY: unwitnessed ground level fall. FINDINGS: CT BRAIN: BRAIN/VENTRICLES: There is no acute intracranial hemorrhage, mass effect or midline shift. No abnormal extra-axial fluid collection. Involutional parenchymal changes. Age-indeterminate (likely chronic) left basal ganglia lacunar infarct. No large territorial hypodensity. There is no evidence of hydrocephalus.  Stable 0.6 cm peripherally calcified cystic pineal lesion, likely an incidental pineal cyst. ORBITS: The visualized portion of the orbits demonstrate no acute abnormality. SINUSES: The visualized paranasal sinuses and mastoid air cells demonstrate no acute abnormality. SOFT TISSUES/SKULL:  No acute abnormality of the visualized skull or soft tissues. CT CERVICAL SPINE: BONES/ALIGNMENT: There is no acute fracture or traumatic malalignment. DEGENERATIVE CHANGES: Multilevel cervical spondylosis, most notable at C5-C6 and C6-C7. No high-grade bony spinal canal stenosis. SOFT TISSUES: There is no prevertebral soft tissue swelling. Partially visualized right IJ approach chest port. CT BRAIN: 1. No evidence of acute hemorrhage, large territorial hypodensity, significant mass effect/midline shift, or ventriculomegaly 2. Involutional parenchymal changes. 3. Age-indeterminate (likely chronic) left basal ganglia lacunar infarct. If clinically indicated, can consider further evaluation with MRI if no contraindications. CT CERVICAL SPINE: 1. No acute abnormality of the cervical spine. 2. Multilevel cervical spondylosis, most notable at C5-C6 and C6-C7. No high-grade bony spinal canal stenosis. CT ABDOMEN PELVIS W IV CONTRAST Additional Contrast? Radiologist Recommendation    Result Date: 1/3/2023  EXAMINATION: CT OF THE ABDOMEN AND PELVIS WITH CONTRAST 1/3/2023 1:58 am TECHNIQUE: CT of the abdomen and pelvis was performed with the administration of intravenous contrast. Multiplanar reformatted images are provided for review. Automated exposure control, iterative reconstruction, and/or weight based adjustment of the mA/kV was utilized to reduce the radiation dose to as low as reasonably achievable. COMPARISON: 12/27/2022 HISTORY: ORDERING SYSTEM PROVIDED HISTORY: rectal bleeding TECHNOLOGIST PROVIDED HISTORY: rectal bleeding Reason for Exam: rectal bleeding FINDINGS: Lower Chest: Small to moderate bilateral pleural effusions. Opacification along the posterior margin of the lower lobes suggesting compressive atelectasis. There is a small to moderate the effusions were present on the prior study as well. Mild thickening of the distal esophagus. Pericardial effusion as well. Organs: Cirrhotic liver with nodular margin. A tips shunt is present. The contrast timing is not dedicated for portal venous phase but the shunt appears to be patent. Gallbladder does contain multiple calcified gallstones and is mildly distended. There is ascites around the margin of the liver and in the upper abdomen degrading evaluation for the local inflammation. Spleen is stable. No pancreatic ductal dilatation and the pancreatic enhancement is normal.  Adrenal glands are not enlarged. Kidneys are enhancing equally without hydronephrosis or hydroureter. GI/Bowel: Mild thickening the gastric mucosal folds and into the distal esophagus. There are fluid distended loops of small bowel in the mid and lower abdomen. Edema of the wall of the small bowel loops in the right lower quadrant with the surrounding ascites. Fluid is also present in the colon and rectum. There is no significant solid fecal load. There is a short segment of very collapsed rectum near the rectosigmoid junction which is worse than the recent exam and may represent spasm rather than underlying mass. Scattered ascites in the abdomen but no pneumoperitoneum. Pelvis: Urinary bladder is collapsed around a Neal catheter and the wall is not evaluated. The prostate is not enlarged. Peritoneum/Retroperitoneum: Atherosclerosis of the aorta and branches but no abdominal aortic aneurysm. Peripherally calcified structure in the left upper quadrant could be a thrombosed splenic artery aneurysm measuring up to 3.1 cm but unchanged from prior exams. . Bones/Soft Tissues: Diffuse degenerative changes in the spine. Stable appearance of T12. body wall edema.      1.  There is a short segment of narrowing of the rectum near the rectosigmoid junction compared to the recent exam and may represent spasm rather than underlying mass. Fluid-filled loops of small bowel and colon suggesting diarrhea with enteritis or colitis. Evaluation of true wall thickening is limited by the diffuse edematous state. 2.  Cirrhotic liver with tips, ascites, and sequela of portal hypertension. 3.  Thickening of the gastric mucosal folds and into the distal esophagus. Correlate with prior history of malignancy. 4.  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 5.  Pleural effusions and pericardial effusion are redemonstrated. The opacified portions of the lower lungs appears to be from compressive atelectasis. CT ABDOMEN PELVIS W IV CONTRAST Additional Contrast? None    Result Date: 12/27/2022  EXAMINATION: CT OF THE ABDOMEN AND PELVIS WITH CONTRAST 12/27/2022 3:31 pm TECHNIQUE: CT of the abdomen and pelvis was performed with the administration of intravenous contrast. Multiplanar reformatted images are provided for review. Automated exposure control, iterative reconstruction, and/or weight based adjustment of the mA/kV was utilized to reduce the radiation dose to as low as reasonably achievable. COMPARISON: 11/03/2022 HISTORY: ORDERING SYSTEM PROVIDED HISTORY: eval shunt from recent TIPS TECHNOLOGIST PROVIDED HISTORY: eval shunt from recent TIPS Decision Support Exception - unselect if not a suspected or confirmed emergency medical condition->Emergency Medical Condition (MA) Reason for Exam: light headed, fatigue, nausea since TIPS surgery 12/1/22 FINDINGS: Lower Chest: Bibasal infiltrates. Moderate bilateral pleural effusions. Pericardial effusion. Circumferential wall thickening of the distal esophagus and gastroesophageal junction consistent with known esophageal malignancy. No significant change since prior examination.   No esophageal obstruction. Abdomen/Pelvis: Organs: There is nodular contour of the liver with enlargement of the caudate and left hepatic lobes consistent with hepatic cirrhosis. No definite focal hepatic mass. Tips in place and appears patent. Cholelithiasis without evidence of acute cholecystitis. The pancreas demonstrates no acute abnormality. There is splenomegaly consistent with portal venous hypertension. Stable  stigmata of portal venous hypertension including esophageal varices and upper abdominal varices. No focal adrenal nodules. The kidneys enhance symmetrically without evidence of hydronephrosis. GI/Bowel: No evidence of abnormal bowel wall thickening or distention. Pelvis: The bladder is unremarkable. No pelvic mass or lymphadenopathy. Peritoneum/Retroperitoneum: There is progressive abdominal and pelvic ascites . No evidence of pneumoperitoneum. 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 No retroperitoneal lymphadenopathy. No evidence of gastrohepatic or periportal lymphadenopathy. Bones/Soft Tissues:   Age related degenerative changes of the visualized osseous structures without focal destructive lesion. Stable circumferential wall thickening of the distal esophagus and gastroesophageal junction consistent with known esophageal malignancy. No significant change since prior examination. No esophageal obstruction. Cirrhotic liver with associated portal venous hypertension, progressive ascites and stable splenomegaly.   TIPS appears in place and patent Cholelithiasis Interval development of bibasal infiltrates and moderate bilateral pleural effusions as well as mild-to-moderate pericardial effusion     IR FLUORO GUIDED NEEDLE PLACEMENT    Result Date: 12/28/2022  PROCEDURE: Ultrasound-guided midline venous catheter placement 12/28/2022 TECHNIQUE: Sonography was utilized HISTORY: ORDERING SYSTEM PROVIDED HISTORY: midline TECHNOLOGIST PROVIDED HISTORY: midline Suspected GI bleed requiring multiple IV medications/fluid. CONTRAST: None SEDATION: None FLUOROSCOPY TIME: None DESCRIPTION OF PROCEDURE: Informed consent was obtained after a detailed explanation of the procedure including risks, benefits, and alternatives. Universal protocol was observed. The procedure was performed by the interventional radiology nurse under my supervision. Using ultrasound guidance, after anesthetizing the skin one percent lidocaine, a micropuncture needle was advanced into the patent right brachial vein. An ultrasound image demonstrating patency of the vein was stored in PACs. Appropriate blood return was obtained and an a micro wire was inserted. The needle was removed and a peel-away sheath was advanced over the wire to allow placement of a 15 cm midline venous catheter. This was secured and dressed appropriately. The catheter aspirated and flushed without resistance. The patient tolerated the procedure well and left the Department stable condition. Dr. Sue Jorge was present. Successful ultrasound-guided placement of a right upper extremity midline venous catheter. US LIVER    Result Date: 12/28/2022  EXAMINATION: RIGHT UPPER QUADRANT ULTRASOUND 12/28/2022 4:16 pm COMPARISON: CT abdomen 12/27/2022 HISTORY: ORDERING SYSTEM PROVIDED HISTORY: check patency of TIPS FINDINGS: LIVER:  The liver demonstrates heterogeneous, multinodular pattern echogenicity without dominant hepatic lesion demonstrated sonographically. No evidence of intrahepatic biliary ductal dilatation. TIPS: Duplex and Doppler waveform imaging of the tips was performed with the following flow velocities: Distal 30.6 cm/second, mid 29.1 cm/second, proximal 33.6 cm/second. BILIARY SYSTEM:  Gallbladder demonstrates a number of layering, echogenic shadowing stones. No wall thickening evident. Negative sonographic Silva's sign. Common bile duct is within normal limits measuring 3 mm. RIGHT KIDNEY: The right kidney is grossly unremarkable without evidence of hydronephrosis. Right kidney long dimension 11.2 cm. PANCREAS:  Visualized portions of the pancreas are unremarkable. OTHER: Small volume right upper quadrant ascites. 1.  Cholelithiasis without definite findings of acute cholecystitis. 2. Patent TIPS. Specific velocities recorded above. 3. Small volume abdominal ascites. 4. Hepatic morphologic features typical of cirrhosis. US GALLBLADDER RUQ    Result Date: 12/30/2022  EXAMINATION: RIGHT UPPER QUADRANT ULTRASOUND 12/30/2022 9:26 am COMPARISON: CT scan of the abdomen and pelvis from 12/27/2022 HISTORY: ORDERING SYSTEM PROVIDED HISTORY: Gall stones, inc SIRS. R/O Cholecystitis TECHNOLOGIST PROVIDED HISTORY: Gall stones, inc SIRS. R/O Cholecystitis FINDINGS: LIVER:  The liver demonstrates irregular contour and heterogeneous echogenicity compatible with known cirrhosis; intrahepatic biliary tree appears nondilated. Functioning tips stent identified (proximal, mid and distal velocities 54.1, 26.6, 25.8 cm/sec). BILIARY SYSTEM:  Gallbladder contains multiple known echogenic stones without wall thickening, or obvious wall edema. Negative sonographic Silva's sign. Common bile duct is within normal limits measuring 2.2 mm. RIGHT KIDNEY: The right kidney is grossly unremarkable without evidence of hydronephrosis. Right kidney measures 11.3 x 5.4 x 6.2 cm; cortical thickness 1.5 cm. PANCREAS:  Visualized portions of the pancreas are unremarkable. OTHER: Moderate right-sided ascites. Small-Moderate right pleural effusion. Multiple cholelithiasis but no ultrasound evidence cholecystitis. Nondilated biliary tree. Cirrhosis. Patent TIPS. Ascites. Additional findings, as above.      XR CHEST PORTABLE    Result Date: 1/24/2023  EXAMINATION: ONE XRAY VIEW OF THE CHEST 1/24/2023 8:24 pm COMPARISON: 01/07/2023 CT chest and 01/03/2023 chest radiograph HISTORY: ORDERING SYSTEM PROVIDED HISTORY: cough TECHNOLOGIST PROVIDED HISTORY: cough Reason for Exam: cough FINDINGS: Right chest port catheter tip overlies the right atrium. The cardiomediastinal silhouette is enlarged, unchanged. Small left and trace right pleural effusions. Bibasilar and left midlung opacities are favored to represent edema and/or atelectasis. No pneumothorax. Osseous structures are unchanged. 1.  Small left and trace right pleural effusions. 2.  Opacities in the left perihilar region and bilateral lung bases may represent a combination of edema and atelectasis. Superimposed infectious process is not excluded. XR CHEST PORTABLE    Result Date: 1/3/2023  EXAMINATION: ONE XRAY VIEW OF THE CHEST 1/3/2023 7:22 am COMPARISON: 01/02/2023, 12/31/2022 HISTORY: ORDERING SYSTEM PROVIDED HISTORY: sepsis TECHNOLOGIST PROVIDED HISTORY: sepsis FINDINGS: Cardiac silhouette enlargement again noted. Right internal jugular port in place. Vascular congestion, basilar opacities and small pleural effusions appear increasing. No pneumothorax identified. Increasing vascular congestion with small pleural effusions, suggestive of developing edema. XR CHEST PORTABLE    Result Date: 1/2/2023  EXAMINATION: ONE XRAY VIEW OF THE CHEST 1/2/2023 9:39 am COMPARISON: Chest x-ray dated 31 December 2022 HISTORY: ORDERING SYSTEM PROVIDED HISTORY: Bilateral arm swelling TECHNOLOGIST PROVIDED HISTORY: Bilateral arm swelling FINDINGS: Right-sided chest port catheter with the tip at the SVC/atrial junction. Mild right basilar atelectasis. Stable cardiomediastinal silhouette.   Mild pulmonary vascular congestion     Mild pulmonary vascular congestion without evidence of overt edema     XR CHEST PORTABLE    Result Date: 12/31/2022  EXAMINATION: ONE XRAY VIEW OF THE CHEST 12/31/2022 6:22 am COMPARISON: 30 December 2022 HISTORY: ORDERING SYSTEM PROVIDED HISTORY: Bilateral pneumonia TECHNOLOGIST PROVIDED HISTORY: Bilateral pneumonia FINDINGS: AP portable view of the chest time stamped at 615 hours demonstrates overlying cardiac monitoring electrodes. Fusion port enters from the right terminating in the superior vena cava. Stable cardiomegaly. There is no significant interval change in bilateral effusions left greater than right, vascular congestion, basilar atelectasis and other pulmonary opacities which may represents superimposed edema or airspace disease. Little change from prior study. Cardiomegaly, vascular congestion, effusions, atelectasis and possible superimposed acute airspace disease are again noted. XR CHEST PORTABLE    Result Date: 12/30/2022  EXAMINATION: ONE XRAY VIEW OF THE CHEST 12/30/2022 9:03 am COMPARISON: AP chest from 12/27/2022 HISTORY: ORDERING SYSTEM PROVIDED HISTORY: Worsening PNA TECHNOLOGIST PROVIDED HISTORY: Worsening PNA FINDINGS: Right IJ chemo port tip position stable. Overlying ECG monitor leads. Mildly enlarged cardiac silhouette. Mediastinal structures midline unchanged. Cephalization of blood flow and hazy airspace opacities mid lower zones greater retrocardiac space, compatible with bilateral pleural effusions, compressive atelectasis, and possibly infiltrate. No Kerley lines but mild central vascular congestion likely. Bones unchanged. Cardiomegaly with probable mild central vascular congestion and similar bilateral pleural effusions with compressive atelectasis; filtrate at either base a consideration. No pulmonary edema. CT CHEST PULMONARY EMBOLISM W CONTRAST    Result Date: 1/7/2023  EXAMINATION: CTA OF THE CHEST 1/7/2023 10:07 pm TECHNIQUE: CTA of the chest was performed after the administration of intravenous contrast.  Multiplanar reformatted images are provided for review. MIP images are provided for review.  Automated exposure control, iterative reconstruction, and/or weight based adjustment of the mA/kV was utilized to reduce the radiation dose to as low as reasonably achievable. COMPARISON: None. HISTORY: ORDERING SYSTEM PROVIDED HISTORY: SOB DVT TECHNOLOGIST PROVIDED HISTORY: SOB DVT Reason for Exam: SOB DVT FINDINGS: Pulmonary Arteries: Pulmonary arteries are adequately opacified for evaluation. No evidence of intraluminal filling defect to suggest pulmonary embolism. Main pulmonary artery is normal in caliber. Mediastinum: No evidence of mediastinal lymphadenopathy. The heart is normal size. Moderate pericardial effusion. .  There is no acute abnormality of the thoracic aorta. Lungs/pleura: Moderate bilateral pleural effusion is associated with atelectatic changes of right lower lobe and lingula and left lower lobe. . Focal consolidative change within the anterior aspect of right upper lobe peripheral aspect of left upper lobe and medial aspect of right middle lobe are likely suggestive of multifocal pneumonia. The lungs are otherwise without acute process. No pulmonary edema. No pneumothorax. Calcified granuloma within the lingula. Upper Abdomen: Cirrhotic liver with TIPS, ascites and portal hypertension . Calcified thrombosed splenic artery measuring 3.1 cm. .  Cholelithiasis with no signs of cholecystitis Soft Tissues/Bones: No acute bone or soft tissue abnormality. No evidence of pulmonary embolism. Moderate bilateral pleural effusion is associated with atelectatic changes of right lower lobe and lingula and left lower lobe. . Focal consolidative change within the anterior aspect of right upper lobe peripheral aspect of left upper lobe and medial aspect of right middle lobe are likely suggestive of multifocal pneumonia. Moderate pericardial effusion. Cirrhotic liver with TIPS, ascites and portal hypertension . Calcified thrombosed splenic artery measuring 3.1 cm. . RECOMMENDATIONS: Unavailable     VL DUP UPPER EXTREMITY VENOUS RIGHT    Result Date: 1/7/2023    [facility]  Vascular Upper Extremities Veins Procedure   Patient Name   Dev Mckeon    Date of Study           01/07/2023                 [address]   Date of Birth  1960  Gender                  Male   Age            60 year(s)  Race                       Room Number    9124        Height:                 70 inch, 177.8 cm   Corporate ID # [id number]    Weight:                 192 pounds, 87.1 kg   Patient Acct # [de-identified]   BSA:        2.05 m^2    BMI:      27.55 kg/m^2   MR #           [id number]     Sonographer             Beatriz Ocampo   Accession #    [id number]  Interpreting Physician  Temple Nyhan   Referring                  Referring Physician     Taylor Sanabria  Nurse  Practitioner  Procedure Type of Study:   Veins: Upper Extremities Veins, Venous Scan Upper Right. Indications for Study:R/O DVT and Arm swelling. Patient Status: In Patient. - Critical Result:Madie HSU RN at 5:01 PM.  Conclusions   Summary   Simultaneous real time imaging utilizing B-Mode, color doppler and  spectral waveform analysis was performed on the right upper extremity for  venous examination of the deep and superficial systems. Findings are:   Right:  Acute deep venous thrombosis identified in the brachial vein. Superficial venous thrombosis of the cephalic vein in the forearm. Signature   ----------------------------------------------------------------  Electronically signed by Opal Harris(Sonographer) on  01/07/2023 05:01 PM  ----------------------------------------------------------------   ----------------------------------------------------------------  Electronically signed by Jennifer Ni(Interpreting  physician) on 01/07/2023 07:54 PM  ----------------------------------------------------------------  Findings:   Right Impression: The brachial vein is partially compressible with hypoechoic echoes and  phasic color Doppler signals.    The cephalic vein is non compressible with hypoechoic echoes with absent  color Doppler signals throughout the forearm. Internal jugular, subclavian, axillary, radial, proximal cephalic and  basilic veins are compressible with normal doppler responses. The ulnar vein was not visualized. Risk Factors History +---------+----+-----------------------------------------------------------+ ! Diagnosis! Date! Comments                                                   ! +---------+----+-----------------------------------------------------------+ ! Other    ! !Port-a-cath in place right chest.                          ! +---------+----+-----------------------------------------------------------+   - The patient's risk factor(s) include: dyslipidemia and arterial     hypertension.   - The patient has a former tobacco history. - The patient has esophageal cancer. Velocities are measured in cm/s ; Diameters are measured in cm Right UE Vein Measurements 2D Measurements +------------------------------------+----------+---------------+----------+ ! Location                            ! Visualized! Compressibility! Thrombosis! +------------------------------------+----------+---------------+----------+ ! Prox IJV                            ! Yes       ! Yes            ! None      ! +------------------------------------+----------+---------------+----------+ ! Dist IJV                            ! Yes       ! Yes            ! None      ! +------------------------------------+----------+---------------+----------+ ! Prox SCV                            ! Yes       ! !None      ! +------------------------------------+----------+---------------+----------+ ! Dist SCV                            ! Yes       ! !None      ! +------------------------------------+----------+---------------+----------+ ! Prox Axillary                       ! Yes       ! Yes            ! None      ! +------------------------------------+----------+---------------+----------+ ! Dist Axillary                       ! Yes       ! Yes            ! None      ! +------------------------------------+----------+---------------+----------+ ! Prox Brachial                       !Yes       ! Partial        !AI        ! +------------------------------------+----------+---------------+----------+ ! Dist Brachial                       !Yes       ! Yes            ! None      ! +------------------------------------+----------+---------------+----------+ ! Prox Radial                         !Yes       ! Yes            ! None      ! +------------------------------------+----------+---------------+----------+ ! Dist Radial                         !Yes       ! Yes            ! None      ! +------------------------------------+----------+---------------+----------+ ! Prox Ulnar                          ! No        !               !          ! +------------------------------------+----------+---------------+----------+ ! Dist Ulnar                          ! No        !               !          ! +------------------------------------+----------+---------------+----------+ ! Basilic at UA                       ! Yes       ! Yes            ! None      ! +------------------------------------+----------+---------------+----------+ ! Basilic at AF                       ! Yes       ! Yes            ! None      ! +------------------------------------+----------+---------------+----------+ ! Basilic at [address]                       ! Yes       ! Yes            ! None      ! +------------------------------------+----------+---------------+----------+ ! Cephalic at UA                      ! Yes       ! Yes            ! None      ! +------------------------------------+----------+---------------+----------+ ! Cephalic at AF                      ! Yes       ! Yes            ! None      ! +------------------------------------+----------+---------------+----------+ ! Jordin at [address]                      ! Yes       ! No             !AI        ! +------------------------------------+----------+---------------+----------+ Doppler Measurements +------------------------+-------------------------+-----------------------+ ! Location                ! Signal                   !Reflux                 ! +------------------------+-------------------------+-----------------------+ ! IJV                     ! Pulsatile                !                       ! +------------------------+-------------------------+-----------------------+ ! SCV                     ! Phasic                   !                       ! +------------------------+-------------------------+-----------------------+ ! Axillary                ! Phasic                   !                       ! +------------------------+-------------------------+-----------------------+ ! Brachial                !Phasic                   !                       ! +------------------------+-------------------------+-----------------------+ Left UE Vein Measurements Doppler Measurements +------------------------+-------------------------+-----------------------+ ! Location                ! Signal                   !Reflux                 ! +------------------------+-------------------------+-----------------------+ ! SCV                     ! Pulsatile                !                       ! +------------------------+-------------------------+-----------------------+    IR US GUIDED PARACENTESIS    Result Date: 1/20/2023  PROCEDURE: PARACENTESIS WITHOUT IMAGE GUIDANCE US ABDOMEN LIMITED 1/20/2023 HISTORY: ORDERING SYSTEM PROVIDED HISTORY: Alcoholic cirrhosis of liver with ascites ([facility] [address] TECHNOLOGIST PROVIDED HISTORY: Weekly due o amount drained every 2 weeks TECHNIQUE: Informed consent was obtained after a detailed explanation of the procedure including risks, benefits, and alternatives. Universal protocol was followed. A limited ultrasound of the abdomen was performed. The right abdomen was prepped and draped in sterile fashion and local anesthesia was achieved with lidocaine.   A 5 [place] needle sheath was advanced into ascites and paracentesis was performed. The patient tolerated the procedure well. Final imaging showed absence of ascitic fluid right side. FINDINGS: Limited ultrasound of the abdomen demonstrates ascites. A total of 3.8 L was removed. Fluid was somewhat chylous in appearance. Successful therapeutic paracentesis. IR US GUIDED PARACENTESIS    Result Date: 1/13/2023  PROCEDURE: PARACENTESIS WITHOUT IMAGE GUIDANCE US ABDOMEN LIMITED 1/13/2023 HISTORY: ORDERING SYSTEM PROVIDED HISTORY: Alcoholic cirrhosis of liver with ascites ([address] TECHNOLOGIST PROVIDED HISTORY: Weekly due o amount drained every 2 weeks TECHNIQUE: Informed consent was obtained after a explanation of the procedure including risks. Universal protocol was followed. A limited ultrasound of the abdomen was performed. The right abdomen was prepped and draped in sterile fashion, and local anesthesia was achieved with 1% lidocaine. A 5 [place] needle sheath was advanced into the ascites under direct ultrasound guidance (a sterile probe cover and gel were used), and paracentesis was performed. A total of 2.2 L of clear yellow fluid was aspirated. The patient tolerated the procedure well without immediately apparent complication. FINDINGS: Initial limited abdominal ultrasound demonstrated a moderate amount of ascites. Successful paracentesis     IR US GUIDED PARACENTESIS    Result Date: 1/3/2023  PROCEDURE: Ultrasound-guided paracentesis 1/3/2023 HISTORY: ORDERING SYSTEM PROVIDED HISTORY: ascites TECHNOLOGIST PROVIDED HISTORY: ascites TECHNIQUE: This procedure was performed by Frederick Hill PA-C under indirect supervision of . Informed consent was obtained after a detailed explanation of the procedure including the risks, benefits, and alternatives. Universal protocol was followed.  All elements of maximal sterile barrier technique, including cap, mask, sterile gown, sterile gloves, sterile sheet, hand hygiene and 2% chlorhexidine for cutaneous antisepsis were followed. The area was prepped and draped in sterile fashion using maximum barrier technique and local anesthesia was achieved with lidocaine. Pre-procedure ultrasound shows a dominant fluid pocket in the right lowerquadrant. Using ultrasound guidance (image attached to the medical record), the fluid pocket was accessed with a 5 Darian Blood Yueh needle with aspiration of clear yellow fluid. Dana vacuum machine was connected and paracentesis was performed and approximately 2200 mL were removed. Post procedural ultrasound demonstrated no residual fluid. A sample was collected and sent for laboratory analysis. Estimated blood loss was minimal. The patient tolerated the procedure well and left the department in good condition. FINDINGS: Limited ultrasound of the abdomen demonstrates ascites. A total of 2200 mL of clear yellow fluid was removed. Successful ultrasound-guided therapeutic paracentesis. IR US GUIDED PARACENTESIS    Result Date: 12/28/2022  PROCEDURE: PARACENTESIS WITH IMAGE GUIDANCE US ABDOMEN LIMITED 12/28/2022 HISTORY: ORDERING SYSTEM PROVIDED HISTORY: ascites TECHNOLOGIST PROVIDED HISTORY: ascites only remove 4 liters give albumin with paracentesis TECHNIQUE: Informed consent was obtained after a detailed explanation of the procedure including risks, benefits, and alternatives. Universal protocol was followed. A limited ultrasound of the abdomen was performed. The right abdomen was prepped and draped in sterile fashion and local anesthesia was achieved with lidocaine. An 5 [place] needle sheath was advanced into ascites and paracentesis was performed. The patient tolerated the procedure well. A sample was sent as requested. FINDINGS: Limited ultrasound of the abdomen demonstrates ascites. A total of 4.8 L was removed. Successful ultrasound-guided therapeutic and diagnostic paracentesis.      IR REVISION TIPS    Result Date: 1/2/2023  PROCEDURE: VR REVISION OF TIPS AND COIL EMBOLIZATION LEFT GASTRIC VEIN General anesthesia 12/30/2022 HISTORY: ORDERING SYSTEM PROVIDED HISTORY:  TIPS early December, now with recurrent bleeding. Reassessment and possible revision/BATO requested. CONTRAST: Isovue 370-110/200 mL SEDATION: General anesthesia. FLUOROSCOPY DOSE AND TYPE OR TIME AND EXPOSURES: Fluoroscopy time-29.1 minutes. PKC-54764.65 uGy cm squared. DESCRIPTION OF PROCEDURE: Informed consent was obtained from the patient's family (sister and her ) after discussions regarding the procedure, its risks, implications and possible complications. A time-out/universal protocol was obtained in the biplane angiography suite pain. The procedure was performed under general anesthesia. Using 1% lidocaine for local anesthesia sterile technique under real-time sonographic guidance, a 21 gauge micropuncture needle was placed in the right internal jugular vein. With Seldinger technique, a 10 [place] sheath was advanced into the right atrium. Right atrial pressure was measured (mean 12 mm Hg); an angled catheter was then used to select the existing TIPS stent, and manipulated into the portal vein. Contrast material was injected manually opacifying the portal vein. The 5 [place] angled catheter and guidewire were advanced into the portal vein and pressure measurements were obtained in the portal system. A post TIPS 6 mm tripp-systemic gradient was found. 5 [place] pigtail catheter was then inserted and splenoportography performed; digital images were obtained. The existing catheter was removed, and a rim catheter inserted which was manipulated into the left gastric vein; a Lantern microcatheter was then manipulated into the left gastric vein as far superiorly as possible. Multiple penumbra coils (pods, packing and Di coils). An angled catheter was then manipulated into posterior gastric veins and additional hand contrast injections performed.  The stent was then further dilated with a 10 mm balloon. Final digital angiograms were obtained verifying excellent patency of the shunt with no filling of varices. The sheath was then removed and a sterile bandage was placed at the puncture site in the right neck. FINDINGS: As above     TIPS stent reassessment showing 6 mm Hg portosystemic shunt; no significant variceal filling demonstrated, but successful left gastric vein embolization performed. TIPS stent angioplasty to 10 mm with excellent flow demonstrated. The findings were discussed with Dr. Yusra Hendrix post procedure. US GUIDED PARACENTESIS    Result Date: 1/6/2023  PROCEDURE: Ultrasound-guided paracentesis 1/6/2023 HISTORY: ORDERING SYSTEM PROVIDED HISTORY: ascities TECHNOLOGIST PROVIDED HISTORY: ascities TECHNIQUE: This procedure was performed by Tanesha Miller PA-C under indirect supervision of . Informed consent was obtained after a detailed explanation of the procedure including the risks, benefits, and alternatives. Universal protocol was followed. All elements of maximal sterile barrier technique, including cap, mask, sterile gown, sterile gloves, sterile sheet, hand hygiene and 2% chlorhexidine for cutaneous antisepsis were followed. The area was prepped and draped in sterile fashion using maximum barrier technique and local anesthesia was achieved with lidocaine. Pre-procedure ultrasound shows a dominant fluid pocket in the right lowerquadrant. Using ultrasound guidance (image attached to the medical record), the fluid pocket was accessed with a 5 [place] Yueh needle with aspiration of clear yellow fluid. [employer] vacuum machine was connected and paracentesis was performed and approximately 1200 mL were removed. Post procedural ultrasound demonstrated no residual fluid. A sample was not sent for laboratory analysis. Estimated blood loss was minimal. The patient tolerated the procedure well and left the department in good condition.  FINDINGS: Limited ultrasound of the abdomen demonstrates ascites. A total of 1200 mL of clear yellow fluid was removed. Successful ultrasound-guided paracentesis. MRI BRAIN WO CONTRAST    Result Date: 1/2/2023  EXAMINATION: MRI OF THE BRAIN WITHOUT CONTRAST  12/31/2022 2:30 pm TECHNIQUE: Multiplanar multisequence MRI of the brain was performed without the administration of intravenous contrast. COMPARISON: CT head 12/31/2022 and MRI brain 09/30/2020. HISTORY: ORDERING SYSTEM PROVIDED HISTORY: R/O basal ganglia infarct TECHNOLOGIST PROVIDED HISTORY: R/O basal ganglia infarct Reason for Exam: R/O basal ganglia infarct FINDINGS: INTRACRANIAL STRUCTURES/VENTRICLES: There is no acute infarct. No mass effect or midline shift. No evidence of an acute intracranial hemorrhage. The ventricles and sulci are normal in size and configuration. The sellar/suprasellar regions appear unremarkable. The normal signal voids within the major intracranial vessels appear maintained. Mild involutional changes are identified within the brain. Small lacune is identified within the left basal ganglia. ORBITS: The visualized portion of the orbits demonstrate no acute abnormality. SINUSES: Moderate-sized bilateral mastoid effusions are identified. Small right maxillary sinus and left maxillary sinus polyps versus mucous retention cysts are noted. BONES/SOFT TISSUES: The bone marrow signal intensity appears normal. The soft tissues demonstrate no acute abnormality. Low T1 signal abnormality is identified within the marrow elements. These changes may represent a marrow replacement process such as anemia. No evidence of acute ischemia. New moderate-sized bilateral mastoid effusions. Small old lacune within the left basal ganglia. New low T1 signal within the marrow elements diffusely likely representing a marrow replacement process such as anemia.         Current Facility-Administered Medications   Medication Dose Route Frequency Provider Last Rate Last Admin    melatonin tablet 3 mg  3 mg Oral Nightly PRN Arianna Gideon, APRN - CNP   3 mg at 01/25/23 0144    butalbital-APAP-caffeine -40 MG per capsule 1 capsule  1 capsule Oral Q4H PRN Arianna Gideon, APRN - CNP   1 capsule at 01/25/23 9903    spironolactone (ALDACTONE) tablet 25 mg  25 mg Oral Daily Arianna Gideon, APRN - CNP   25 mg at 01/25/23 0908    sodium chloride flush 0.9 % injection 5-40 mL  5-40 mL IntraVENous 2 times per day Arianna Gideon, APRN - CNP   10 mL at 01/25/23 9748    sodium chloride flush 0.9 % injection 10 mL  10 mL IntraVENous PRN Arianna Gideon, APRN - CNP        0.9 % sodium chloride infusion   IntraVENous PRN Arianna Gideon, APRN - CNP        ondansetron (ZOFRAN-ODT) disintegrating tablet 4 mg  4 mg Oral Q8H PRN Arianna Gideon, APRN - CNP   4 mg at 01/25/23 3694    Or    ondansetron (ZOFRAN) injection 4 mg  4 mg IntraVENous Q6H PRN Arianna Gideon, APRN - CNP        magnesium hydroxide (MILK OF MAGNESIA) 400 MG/5ML suspension 30 mL  30 mL Oral Daily PRN Arianna Gideon, APRN - CNP        acetaminophen (TYLENOL) tablet 650 mg  650 mg Oral Q6H PRN Arianna Gideon, APRN - CNP        Or    acetaminophen (TYLENOL) suppository 650 mg  650 mg Rectal Q6H PRN Arianna Gideon, APRN - CNP        0.9 % sodium chloride infusion   IntraVENous Continuous Arianna Gideon, APRN - CNP 75 mL/hr at 01/25/23 0016 New Bag at 01/25/23 0016    atorvastatin (LIPITOR) tablet 20 mg  20 mg Oral Nightly Arianna Gideon, APRN - CNP   20 mg at 01/25/23 0144    ferrous sulfate (IRON 325) tablet 325 mg  325 mg Oral BID WC Arianna Gideon, APRN - CNP   325 mg at 01/25/23 0818    furosemide (LASIX) tablet 40 mg  40 mg Oral BID Arianna Gideon, APRN - CNP   40 mg at 01/25/23 0818    lactulose (CHRONULAC) 10 GM/15ML solution 20 g  20 g Oral TID Arianna Gideon, APRN - CNP   20 g at 01/25/23 0819    pantoprazole (PROTONIX) tablet 40 mg  40 mg Oral Daily MASSIMO Luis - CNP   40 mg at 01/25/23 7909       Impressions :     1. Principal Problem:    Symptomatic anemia, microcytic, acute  Active Problems:    Ascites due to alcoholic cirrhosis ([address]  Resolved Problems:    * No resolved hospital problems. *        2.  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 ([address] Alcoholic cirrhosis of liver with ascites ([address] Anemia (04/13/2022), Anxiety, Arthritis, Back pain, chronic, [place] esophagus, Bleeding gastric varices (12/29/2022), BPH (benign prostatic hypertrophy), Cholelithiasis, Cirrhosis ([address] COVID-19 (12/2020), COVID-19 vaccine series completed (5/20/2021, 6/22/2021), DDD (degenerative disc disease), lumbar, Depression, Esophageal cancer ([address] Esophageal varices ([address] Fatty liver, GERD (gastroesophageal reflux disease), GI bleed, Hep C w/o coma, chronic ([address] History of alcohol abuse, History of blood transfusion, History of colon polyps (2016), History of tobacco abuse, [place] (hard of hearing), Hyperlipidemia, Hypertension, Hyponatremia (07/20/2016), Hypotension (12/20/2021), Mastoid disorder, bilateral (12/31/2022), PONV (postoperative nausea and vomiting), Port-A-Cath in place, Portal hypertension ([address] Sciatica, Secondary esophageal varices ([address] (06/07/2022), Shortness of breath, Spinal stenosis, Stomach ulcer, Thrombocytopenia ([address] (12/23/2020), Tubular adenoma of colon (2016, 2018), Vitamin D deficiency, and Wears glasses.      Plans:     54-year-old male presenting with abnormal lab hemoglobin of 6.4 outpatient, 5.7 in ER  Status post TIPS procedure in November for cirrhosis and subsequent bleed in December from TIPS, with embolization of left gastric vein  History of alcoholic liver disease with cirrhosis, history of EV with banding, esophageal cancer that is post chemo and RT    Severe anemia hemoglobin 5.72 unit PRBC administered GI consulted hemodynamically stable  Alcoholic cirrhosis with ascites status post TIPS-paracentesis every Friday continuing home Lasix spironolactone  Hyponatremia likely secondary to hypervolemia, liver disease-continue Lasix, Aldactone  Alcohol intoxication- on arrival    1/25  Due for ultrasound liver to evaluate TIPS per GI monitor hemoglobin transfuse to keep 7 or above      DVT pplx-mechanical only      Jadyn Slade MD  1/25/2023  1:08 PM

## 2023-01-25 NOTE — FLOWSHEET NOTE
01/25/23 0626   Treatment Team Notification   Reason for Communication Evaluate   Team Member Name   (Dr. Jessica Leon)   Treatment Team Role Consulting Provider   Method of Communication Page   Response Waiting for response     RN paged Dr. Jessica Leon. No response at this time. Writer gave shift report to on coming RN. Dr. Vandana Clements NP notified of new consult with anemia and Hx of a TIPS procedure. Awaiting a response at this time.

## 2023-01-25 NOTE — TELEPHONE ENCOUNTER
Writer contacted Dr. Sury Mendez to inform of 30 day readmission risk. Dr. Sury Mendez informed writer there is no decision on disposition at this time.       Call Back: If you need to call back to inform of disposition you can contact me at [phone]

## 2023-01-25 NOTE — ED NOTES
Report given to Skye Vines RN from U. Report method by phone   The following was reviewed with receiving RN:   Current vital signs:  BP (!) 114/53   Pulse 100   Temp 97.9 °F (36.6 °C) (Oral)   Resp 14   Ht 5' 10\" (1.778 m)   Wt 210 lb (95.3 kg)   SpO2 98%   BMI 30.13 kg/m²                MEWS Score: 0     Any medication or safety alerts were reviewed. Any pending diagnostics and notifications were also reviewed, as well as any safety concerns or issues, abnormal labs, abnormal imaging, and abnormal assessment findings. Questions were answered.           Kumar Sauer RN  01/24/23 7925

## 2023-01-25 NOTE — ED PROVIDER NOTES
EMERGENCY DEPARTMENT ENCOUNTER    Pt Name: China Moody  MRN: [id number]  [place] 1960  Date of evaluation: 1/24/23  CHIEF COMPLAINT       Chief Complaint   Patient presents with    Abnormal Lab     Pt reports a hemoglobin of 6.0     HISTORY OF PRESENT ILLNESS   This is a 24-year-old male he is got a history of hypertension, hyperlipidemia, CKD, alcoholic cirrhosis status post TIPS procedure presents with outpatient anemia patient has frequent anemia requiring blood transfusions    Called by his doctor because his hemoglobin was reportedly 6    Told to come to the emergency department    He states ever since his TIPS procedure he has some fogginess and ongoing confusion    States he had a recent pneumonia and has some chronic shortness of breath associated with it and some lightheadedness    Denying any chest pain no abdominal pain no vomiting no hematemesis no melena no dark tarry stools no bright red blood per rectum            REVIEW OF SYSTEMS     Review of Systems   Constitutional:  Negative for chills and fever. HENT:  Negative for rhinorrhea and sore throat. Eyes:  Negative for discharge and redness. Respiratory:  Positive for shortness of breath. Negative for chest tightness. Cardiovascular:  Negative for chest pain. Gastrointestinal:  Negative for abdominal pain, diarrhea, nausea and vomiting. Genitourinary:  Negative for dysuria and frequency. Musculoskeletal:  Negative for arthralgias and myalgias. Skin:  Negative for rash. Neurological:  Positive for light-headedness. Negative for weakness and numbness. Psychiatric/Behavioral:  Negative for suicidal ideas. All other systems reviewed and are negative.   PASTMEDICAL HISTORY     Past Medical History:   Diagnosis Date    Adenocarcinoma in a polyp ([facility]     Alcoholic cirrhosis of liver with ascites ([facility]     Anemia 04/13/2022    Anxiety     Arthritis     Back pain, chronic     dr. Stephan Helms, orthopedic, every 3-4 months, gets steroid injection    Lezama esophagus     Bleeding gastric varices 12/29/2022    BPH (benign prostatic hypertrophy)     Cholelithiasis     Cirrhosis ([address]     COVID-19 12/2020    pt reports he had a positive test while at [facility] in [facility], was asymptomatic    COVID-19 vaccine series completed 5/20/2021, 6/22/2021    Moderna 5/20/2021, 6/22/2021    DDD (degenerative disc disease), lumbar     Depression     Esophageal cancer ([address]     INVASIVE ADENOCARCINOMA ARISING IN TUBULAR ADENOMA WITH HIGH GRADE DYSPLASIA, ASSOCIATED WITH FOCAL INTESTINAL METAPLASIA     Esophageal varices ([address]     Fatty liver     GERD (gastroesophageal reflux disease)     GI bleed     Hep C w/o coma, chronic ([address]     History of alcohol abuse     6-12 beers a day; quit drinking 2019    History of blood transfusion     History of colon polyps 2016    History of tobacco abuse     [place] (hard of hearing)     Hyperlipidemia     Hypertension     Hyponatremia 07/20/2016    Hypotension 12/20/2021    Mastoid disorder, bilateral 12/31/2022    biLateral mastoid effusion    PONV (postoperative nausea and vomiting)     Port-A-Cath in place     right upper chest    Portal hypertension ([address]     Sciatica     Secondary esophageal varices ([address] 06/07/2022    Shortness of breath     Spinal stenosis     Stomach ulcer     hx of    Thrombocytopenia ([address] 12/23/2020    Tubular adenoma of colon 2016, 2018    Vitamin D deficiency     Wears glasses      Past Problem List  Patient Active Problem List   Diagnosis Code    Back pain, chronic M54.9, G89.29    Hearing difficulty H91.90    GERD (gastroesophageal reflux disease) K21.9    Cervical radicular pain M54.12    Hypomagnesemia E83.42    Chronic viral hepatitis B without delta agent and without coma (HCC) B18.1    Calculus of gallbladder without cholecystitis K80.20    Hep C w/o coma, chronic (HCC) B18.2    Fatty liver K76.0    Psychophysiologic insomnia F51.04    Cirrhosis (HCC) K74.60    Decompensation of cirrhosis of liver (HCC) K72.90, K74.60    Vertebrogenic low back pain M54.51    DDD (degenerative disc disease), lumbar M51.36    Depression F32. A    Tubular adenoma of colon D12.6    History of colon polyps Z86.010    Gynecomastia, male N62    Lumbar radiculitis M54.16    Lumbar disc herniation M51.26    Tinnitus H93.19    Eustachian tube dysfunction H69.80    Ganglion cyst M67.40    Carpal tunnel syndrome of right wrist G56.01    History of hepatitis C Z86.19    Vitamin D deficiency E55.9    Pure hypercholesterolemia E78.00    Acute hypokalemia E87.6    Essential hypertension I10    Recurrent major depressive disorder in partial remission (HCC) F33.41    S/P epidural steroid injection Z92.241    Elevated LFTs R79.89    Seasonal allergies J30.2    Lumbar facet arthropathy M47.816    Cervical facet syndrome M47.812    Thrombocytopenia (HCC) D69.6    Hepatitis C virus infection resolved after antiviral drug therapy Z86.19    Alcohol abuse F10.10    Altered mental status R41.82    Hypocalcemia E83.51    Hypophosphatemia E83.39    Malignant neoplasm of lower third of esophagus (HCC) C15.5    COVID-19 U07.1    Anxiety F41.9    Current smoker F17.200    Severe comorbid illness R69    Gait instability R26.81    Abnormal findings on diagnostic imaging of spine R93.7    Cervical spinal stenosis M48.02    Spinal stenosis of lumbar region with neurogenic claudication M48.062    Low hemoglobin D64.9    Symptomatic anemia, microcytic, acute D64.9    Hypotension I95.9    Former smoker, 50+ pack years, quit 2016 Z87.891    HLD (hyperlipidemia) E78.5    Esophageal adenocarcinoma (HCC) C15.9    Anemia D64.9    Acute kidney injury ([address] N17.9    Ascites due to alcoholic cirrhosis ([address] V93.61    Shortness of breath R06.02    Drop in hemoglobin R71.0    Esophageal polyp K22.81    Acute kidney failure, unspecified (HCC) N17.9    Muscle weakness (generalized) M62.81    Other abnormalities of gait and mobility R26.89    GI bleed K92.2    Goals of care, counseling/discussion Z71.89    ACP (advance care planning) Z71.89    Palliative care encounter Z51.5    S/P TIPS (transjugular intrahepatic portosystemic shunt) Z95.828    Lezama's esophagus with dysplasia K22.719    Mastoid disorder, bilateral H74.93    Acute deep vein thrombosis (DVT) of brachial vein of right upper extremity (HCC) I82.621    Chronic hepatitis C without hepatic coma (HCC) B18.2    Swelling of joint of upper arm, right M25.421    Anasarca R60.1     SURGICAL HISTORY       Past Surgical History:   Procedure Laterality Date    BUNIONECTOMY      twice on right side    BUNIONECTOMY Left     CARPAL TUNNEL RELEASE Right     COLONOSCOPY      at age 35    COLONOSCOPY  10/05/2016    polyps-pathology tubular adenoma, and abnormal looking mucosa right colon-pathology-tubular adenoma    COLONOSCOPY N/A 03/30/2018    COLONOSCOPY POLYPECTOMY COLD BIOPSY performed by Sony Rose MD at [address],[facility] [address]  03/30/2018    Small polyp in the sigmoid colon and excised with biopsy forceps--tubular adenoma    COLONOSCOPY N/A 04/16/2022    COLONOSCOPY POLYPECTOMY performed by Sony Rose MD at [facility] COLON, DIAGNOSTIC      EGD    ESOPHAGOGASTRODUODENOSCOPY  12/29/2022    ESOPHAGOGASTRODUODENOSCOPY N/A 01/03/2023    IR PORT PLACEMENT EQUAL OR GREATER THAN 5 YEARS  04/19/2021    IR PORT PLACEMENT EQUAL OR GREATER THAN 5 YEARS 4/19/2021 [facility] SPECIAL PROCEDURES    IR TIPS INSERTION  12/01/2022    IR TIPS INSERTION 12/1/2022 Fili Mata MD [facility] SPECIAL PROCEDURES    KNEE SURGERY Left     cyst removed    NASAL SEPTUM SURGERY      NERVE BLOCK Right 11/23/2020    NERVE BLOCK RIGHT CERVICAL STEROID INJECTION  C3-C6 performed by Sebas Bell MD at [address]  01/04/2016    steroid injection C7 T1    OTHER SURGICAL HISTORY  11/21/2016    Bilateral Lumbar CACHORRO L4-L5 injections    OTHER SURGICAL HISTORY  12/19/2016    lumbar steroid injection    OTHER SURGICAL HISTORY  09/28/2018    BILATERAL L5 CACHORRO (N/A Back)    OTHER SURGICAL HISTORY Right 11/23/2020    cervical injection    PAIN MANAGEMENT PROCEDURE Left 07/09/2020    EPIDURAL STEROID INJECTION LEFT L4 L5 performed by Caren Caicedo MD at [facility] Left 07/20/2020    LEFT L4 L5 EPIDURAL STEROID INJECTION performed by Caren Caicedo MD at [facility] Bilateral 08/17/2020    LUMBAR FACET BILATERAL L2-L5 performed by Caren Caicedo MD at [facility] Bilateral 12/07/2020    NERVE BLOCK BILATERAL LUMBAR MEDIAL BRANCH L2-L5 performed by Caren Caicedo MD at [address] [employer] NEUROPLASTY &/TRANSPOS MEDIAN NRV CARPAL Vivian Cosier Right 08/29/2017    CARPAL TUNNEL RELEASE RIGHT performed by Den Lawton MD at [address] &/TRANSPOS MEDIAN NRV CARPAL TUNNE Left 10/31/2017    CARPAL TUNNEL RELEASE performed by Den Lawton MD at [facility] [address] AA&/STRD TFRML EPI LUMBAR/SACRAL 1 LEVEL Bilateral 09/06/2018    BILATERAL L5 CACHORRO performed by Caren Caicedo MD at [facility] [address] AA&/STRD TFRML EPI LUMBAR/SACRAL 1 LEVEL N/A 09/28/2018    BILATERAL L5 CACHORRO performed by Caren Caicedo MD at [address] [facility] N/A 12/29/2020    EGD BIOPSY performed by Chel Amaya MD at [address] [place] [address] 02/02/2021    EGD BIOPSY and spot marking performed by Bayron Roberson MD at [address] [place] [address] N/A 02/12/2021    ENDOSCOPIC ULTRASOUND, EGD performed by Laverne Mendez MD at [facility] 1  02/12/2021    EGD DIAGNOSTIC ONLY performed by Laverne Mendez MD at [facility] 1 08/31/2021    EGD BIOPSY performed by Bayron Roberson MD at [address] [place] [address] 01/21/2022    EGD BIOPSY performed by Bayron Roberson MD at [place] [address] UPPER GASTROINTESTINAL ENDOSCOPY N/A 04/15/2022    EGD ESOPHAGOGASTRODUODENOSCOPY performed by Maddie Serrato MD at [place] [address]. 06/06/2022    EGD BAND LIGATION performed by Stephanie Vences MD at [place] [address]. 06/09/2022    EGD ESOPHAGOGASTRODUODENOSCOPY performed by Stephanie Vences MD at [place] [address]. N/A 09/14/2022    EGD ESOPHAGOGASTRODUODENOSCOPY ENDOSCOPIC APC AT [facility] [address] performed by Ricky Rivers MD at [place] [address]. 10/19/2022    EGD BIOPSY performed by Benji Buitrago MD at [place] [address]. 10/31/2022    EGD with APC performed by Benji Buitrago MD at [place] [address].  12/29/2022    EGD ESOPHAGOGASTRODUODENOSCOPY performed by She Ellsworth MD at [place] [address]. 1/3/2023    EGD ESOPHAGOGASTRODUODENOSCOPY performed by Janak Hall MD at [address] [facility]       Current Discharge Medication List        CONTINUE these medications which have NOT CHANGED    Details   lactulose (CHRONULAC) 10 GM/15ML solution take 30 milliliters by mouth three times a day  Qty: 473 mL, Refills: 1    Associated Diagnoses: Alcoholic cirrhosis, unspecified whether ascites present (HCC)      furosemide (LASIX) 40 MG tablet Take 1 tablet by mouth in the morning and 1 tablet in the evening.   Qty: 60 tablet, Refills: 3      spironolactone (ALDACTONE) 25 MG tablet Take 1 tablet by mouth daily  Qty: 30 tablet, Refills: 3      pantoprazole (PROTONIX) 40 MG tablet Take 40 mg by mouth daily      FEROSUL 325 (65 Fe) MG tablet take 1 tablet by mouth twice a day  Qty: 60 tablet, Refills: 5    Associated Diagnoses: Anemia, unspecified type      atorvastatin (LIPITOR) 20 MG tablet Take 1 tablet by mouth nightly  Qty: 30 tablet, Refills: 3           ALLERGIES has No Known Allergies. FAMILY HISTORY     He indicated that his mother is . He indicated that his father is . He indicated that both of his sisters are alive. He indicated that his brother is alive. SOCIAL HISTORY       Social History     Tobacco Use    Smoking status: Former     Packs/day: 1.00     Years: 45.00     Pack years: 45.00     Types: Cigarettes     Quit date: 2017     Years since quittin.0    Smokeless tobacco: Never   Vaping Use    Vaping Use: Never used   Substance Use Topics    Alcohol use: Not Currently     Comment: Quit alcohol in 2019- heavier drinking prior to quitting    Drug use: Not Currently     Frequency: 1.0 times per week     Types: Cocaine     Comment: Cocaine- stopped spring 2016     PHYSICAL EXAM     INITIAL VITALS: BP (!) 114/53   Pulse 100   Temp 97.9 °F (36.6 °C) (Oral)   Resp 14   Ht 5' 10\" (1.778 m)   Wt 210 lb (95.3 kg)   SpO2 98%   BMI 30.13 kg/m²    Physical Exam  Vitals and nursing note reviewed. Constitutional:       Appearance: Normal appearance. HENT:      Head: Normocephalic and atraumatic. Nose: Nose normal.      Mouth/Throat:      Mouth: Mucous membranes are moist.   Eyes:      Conjunctiva/sclera: Conjunctivae normal.      Pupils: Pupils are equal, round, and reactive to light. Cardiovascular:      Rate and Rhythm: Normal rate and regular rhythm. Pulses: Normal pulses. Heart sounds: Normal heart sounds. Pulmonary:      Effort: Pulmonary effort is normal.      Breath sounds: Normal breath sounds. Abdominal:      General: There is distension. Palpations: Abdomen is soft. Tenderness: There is no abdominal tenderness. Comments: Distended abdomen but soft no tenderness   Musculoskeletal:         General: No swelling or deformity. Cervical back: Normal range of motion. Skin:     General: Skin is warm. Findings: No rash. Neurological:      General: No focal deficit present.       Mental Status: He is alert and oriented to person, place, and time.    Psychiatric:         Mood and Affect: Mood normal.       MEDICAL DECISION MAKING / ED COURSE:     Is a 27-year-old male with known alcoholic cirrhosis frequent presentations for anemia presenting after outpatient blood work showed anemia    Mildly tachycardic but otherwise well-appearing normal vital signs    Will repeat blood work and likely transfuse    1)  Number and Complexity of Problems Addressed at this Encounter  Problem List This Visit: Anemia    Differential Diagnosis: Anemia of chronic disease, anemia of CKD, GI bleed    Diagnoses Considered but Do Not Suspect: Shock    Pertinent Comorbid Conditions: Cirrhosis, CKD    2)  Data Reviewed and Analyzed  (Lab and radiology tests/orders below in next section)    External Documents Reviewed: Previous presentations for anemia      3)  Treatment and Disposition    ED Course as of 01/25/23 0010   Tue Jan 24, 2023 2228 CBC with Auto Differential(!!):    WBC 4.8   RBC 2.19(!)   Hemoglobin Quant 5.7(!!)   Hematocrit 17.8(!)   MCV 81.4   MCH 26.0   MCHC 32.0   RDW 21.7(!)   Platelet Count 205(!)   MPV 7.2   Seg Neutrophils PENDING   Lymphocytes PENDING   Monocytes PENDING   Eosinophils % PENDING   Basophils PENDING   Segs Absolute PENDING   Absolute Lymph # PENDING   Absolute Mono # PENDING   Absolute Eos # PENDING   Basophils Absolute PENDING  Anemic with a hemoglobin of 5.7 and platelets of 692 [REY]   2229 BMP(!):    Glucose, Random 112(!)   BUN,BUNPL 13   Creatinine 0.53(!)   Est, Glom Filt Rate >60   CALCIUM, SERUM, 720924 7.7(!)   Sodium 131(!)   Potassium 4.2   Chloride 102   CO2 23   Anion Gap 6(!)  Unremarkable mild hyponatremia [REY]   2229 Hepatic Function Panel(!):    Albumin 2.4(!)   Alk Phos 197(!)   ALT 25   AST 69(!)   Bilirubin 1.5(!)   Bilirubin, Direct 0.9(!)   Bilirubin, Indirect 0.6   Total Protein 5.0(!)  Elevated consistent with cirrhosis [REY]   2229 Protime-INR(!):    Prothrombin Time 17. 0(!)   INR 1.4  Normal [REY]   2229 Magnesium:    Magnesium 1.7  Normal [REY]   2229 Lipase:    Lipase 34  Normal [REY]   2229 ETOH(!):    ETHANOL,ETHA 252(!)   Ethanol percent 0.252  Intoxicated [REY]      ED Course User Index  [REY] Carlee Renner MD       Patient repeat assessment: Resting comfortably    Disposition discussion with patient/family, Shared Decision Making: Discussed with patient admission for low hemoglobin requiring transfusion    He is agreeable to this plan    Case discussed with consulting clinician: Discussed with Collette Barksdale NP for [facility] clinic who accepts admission    In summary this is a 59-year-old history of cirrhosis frequent anemia presents for anemia with low hemoglobin    Given transfusion and admitted    CRITICAL CARE:       PROCEDURES:    Procedures      DATA FOR LAB AND RADIOLOGY TESTS ORDERED BELOW ARE REVIEWED BY THE ED CLINICIAN:    RADIOLOGY: All x-rays, CT, MRI, and formal ultrasound images (except ED bedside ultrasound) are read by the radiologist, see reports below, unless otherwise noted in MDM or here. Reports below are reviewed by myself. XR CHEST PORTABLE   Final Result   1. Small left and trace right pleural effusions. 2.  Opacities in the left perihilar region and bilateral lung bases may   represent a combination of edema and atelectasis. Superimposed infectious   process is not excluded. LABS: Lab orders shown below, the results are reviewed by myself, and all abnormals are listed below.   Labs Reviewed   CBC WITH AUTO DIFFERENTIAL - Abnormal; Notable for the following components:       Result Value    RBC 2.19 (*)     Hemoglobin 5.7 (*)     Hematocrit 17.8 (*)     RDW 21.7 (*)     Platelets 376 (*)     All other components within normal limits   BASIC METABOLIC PANEL - Abnormal; Notable for the following components:    Glucose 112 (*)     Creatinine 0.53 (*)     Calcium 7.7 (*)     Sodium 131 (*)     Anion Gap 6 (*)     All other components within normal limits   PROTIME-INR - Abnormal; Notable for the following components:    Protime 17.0 (*)     All other components within normal limits   HEPATIC FUNCTION PANEL - Abnormal; Notable for the following components:    Albumin 2.4 (*)     Alkaline Phosphatase 197 (*)     AST 69 (*)     Total Bilirubin 1.5 (*)     Bilirubin, Direct 0.9 (*)     Total Protein 5.0 (*)     All other components within normal limits   ETHANOL - Abnormal; Notable for the following components:    Ethanol 252 (*)     All other components within normal limits   LIPASE   MAGNESIUM   AMMONIA   OCCULT BLOOD SCREEN   PERIPHERAL BLOOD SMEAR, PATH REVIEW   BASIC METABOLIC PANEL W/ REFLEX TO MG FOR LOW K   TYPE AND SCREEN       Vitals Reviewed:    Vitals:    01/24/23 2001 01/24/23 2111 01/24/23 2336   BP: (!) 126/51  (!) 114/53   Pulse: (!) 110 (!) 110 100   Resp: 14  14   Temp: 97.7 °F (36.5 °C)  97.9 °F (36.6 °C)   TempSrc: Tympanic  Oral   SpO2: 100%  98%   Weight: 210 lb (95.3 kg)     Height: 5' 10\" (1.778 m)       MEDICATIONS GIVEN TO PATIENT THIS ENCOUNTER:  Orders Placed This Encounter   Medications    sodium chloride flush 0.9 % injection 5-40 mL    sodium chloride flush 0.9 % injection 10 mL    0.9 % sodium chloride infusion    OR Linked Order Group     ondansetron (ZOFRAN-ODT) disintegrating tablet 4 mg     ondansetron (ZOFRAN) injection 4 mg    magnesium hydroxide (MILK OF MAGNESIA) 400 MG/5ML suspension 30 mL    OR Linked Order Group     acetaminophen (TYLENOL) tablet 650 mg     acetaminophen (TYLENOL) suppository 650 mg    0.9 % sodium chloride infusion    atorvastatin (LIPITOR) tablet 20 mg    ferrous sulfate (IRON 325) tablet 325 mg    furosemide (LASIX) tablet 40 mg    lactulose (CHRONULAC) 10 GM/15ML solution 20 g    pantoprazole (PROTONIX) tablet 40 mg     DISCHARGE PRESCRIPTIONS:  Current Discharge Medication List        PHYSICIAN CONSULTS ORDERED THIS ENCOUNTER:  None  FINAL IMPRESSION      1.  Anemia, unspecified type DISPOSITION/PLAN   DISPOSITION Admitted 01/24/2023 10:51:55 PM      OUTPATIENT FOLLOW UP THE PATIENT:  No follow-up provider specified.     MD Maren Santos MD  01/25/23 0010

## 2023-01-25 NOTE — PROGRESS NOTES
RN spoke with Dr. Yan Read regarding new consult. Patient admitted for symptomatic anemia with a hemoglobin of 5.7 in the ED. Patient received 1 unit PRBCs. Reviewed patient's history of a TIPS procedure in Nov. Patient has had an admission since then with complications of bleeding from the TIPS site. Patient reports no active signs or symptoms of bleeding. Patient's abdomen distended, but the patient does get weekly paracentesis for chronic cirrhosis. Dr. Yan Read agreed with the plan to give the patient a second unit of PRBC ordered by internal med and stated he will see the patient today.

## 2023-01-25 NOTE — PROGRESS NOTES
Pt had liver ultrasound this afternoon, results sent to Kenisha Kelsey NP. Order to increase diet to 2gm sodium diet. Pt received 2 units of blood since admission. Hemoglobin recheck is currently 8.3.

## 2023-01-25 NOTE — PLAN OF CARE
Problem: Safety - Adult  Goal: Free from fall injury  1/25/2023 0424 by Fara Klinefelter, RN  Outcome: Progressing  Note: No falls noted this shift. Patient ambulates with a standby staff assistance without difficulty. Bed kept in low position. Safe environment maintained. Bedside table & call light in reach. Uses call light appropriately when needing assistance. Problem: Chronic Conditions and Co-morbidities  Goal: Patient's chronic conditions and co-morbidity symptoms are monitored and maintained or improved  1/25/2023 0424 by Fara Klinefelter, RN  Outcome: Progressing     Problem: Pain  Goal: Verbalizes/displays adequate comfort level or baseline comfort level  1/25/2023 0424 by Fara Klinefelter, RN  Outcome: Progressing  Note: Patient given PRN pain medication for pain control.      Problem: Discharge Planning  Goal: Discharge to home or other facility with appropriate resources  1/25/2023 0424 by Fara Klinefelter, RN  Outcome: Progressing

## 2023-01-25 NOTE — PLAN OF CARE
Problem: Discharge Planning  Goal: Discharge to home or other facility with appropriate resources  Outcome: Progressing     Problem: ABCDS Injury Assessment  Goal: Absence of physical injury  Outcome: Progressing     Problem: Safety - Adult  Goal: Free from fall injury  Outcome: Progressing     Problem: Pain  Goal: Verbalizes/displays adequate comfort level or baseline comfort level  Outcome: Progressing     Problem: Chronic Conditions and Co-morbidities  Goal: Patient's chronic conditions and co-morbidity symptoms are monitored and maintained or improved  Outcome: Progressing

## 2023-01-25 NOTE — CARE COORDINATION
CASE MANAGEMENT NOTE:    Admission Date:  1/24/2023 Milan Vogel is a 60 y.o.  male    Admitted for : Symptomatic anemia [D64.9]  Anemia, unspecified type [D64.9]    Met with:  Patient    PCP: Jacqui Velásquez                                 Insurance:  [address]      Is patient alert and oriented at time of discussion:  Yes    Current Residence/ Living Arrangements:  independently at home alone            Current Services PTA:  Yes    Does patient go to outpatient dialysis: No  If yes, location and chair time: NA  Who is their nephrologist? NA    Is patient agreeable to VNS: Yes    Freedom of choice provided:  Yes    List of [address] provided: Yes    VNS chosen:  Yes- [facility]    DME:  crutches, walker, and NO     Home Oxygen: No    Nebulizer: No    CPAP/BIPAP: No    Supplier: N/A    Potential Assistance Needed: No    SNF needed: No    Freedom of choice and list provided: No    Pharmacy:  [facility] on [address]       Is patient currently receiving oral anticoagulation therapy? No    Is the Patient an NIHARIKA STEELE [facility] with Readmission Risk Score greater than 14%? No  If yes, pt needs a follow up appointment made within 7 days. Family Members/Caregivers that are willing and able to care for patient at home:    Yes    If yes, list name here:  Antoinettehipolito Abad educated on resources available including DME and respite care: Yes    Transportation Provider:  Family             Discharge Plan:  1/25/23 [facility] From home DME: crutches, walker, GB VNS: current with [facility] Pt gets weekly paracentesis on Friday at [facility]. Recent TIPS procedure 11/22. Consult for GI HGB 6.9 today. LACY NEEDS SIGNED/COMPLETED.  Following for needs//JF                           Electronically signed by: Zona Schwab, RN on 1/25/2023 at 8:37 AM

## 2023-01-25 NOTE — PROGRESS NOTES
Aisha Thomas is a 60 y.o. Non- / non  male who presents with Abnormal Lab (Pt reports a hemoglobin of 6.0)   and is admitted to the hospital for the management of Symptomatic anemia. According to patient, he had outpatient lab work completed today and received a call from his PCP instructing him to come to the ED for hemoglobin 6.4. Repeat hemoglobin obtained in ED 5.7. Patient reports that he had a TIPS procedure completed in November for treatment of cirrhosis and states that he was hospitalized for a week in December due to bleeding from his TIPS. Symptoms are associated with shortness of breath with activity, which has been present since having pneumonia. Denies fever, chills, chest pain, cough, abdominal pain, nausea, vomiting, diarrhea, and urinary symptoms. Denies hematochezia, melena, hematuria, and all other obvious signs of bleeding. Declined REBECCA in ED. States that stools are brown as usual.  There are no aggravating or alleviating factors. Symptoms are reported as Constant and moderate.         Patient status inpatient in the Progressive Unit/Step down    Anemia  -Hemoglobin 6.4 yesterday afternoon  --Dropped to 5.7 upon arrival to ED  --2 unit PRBC administered  -hemoccult pending specimen   -- Refused REBECCA in ED  -Anemia work-up completed 12/29/2022  --Iron 29, iron saturation 16, TIBC 176  -Reports that he continues to take ferrous sulfate 325 mg p.o. twice daily  -Reports episode of bleeding from TIPS in November  -Denies signs of GI bleeding  -Consult GI to see in am   -concern for additional bleeding from TIPS  -NPO after midnight     Alcoholic cirrhosis with ascites  -TIPS procedure completed in November  -Reports he receives paracentesis every Friday  -Continue home dose of Lasix  -Continue home dose spironolactone  -GI consulted   -continue home dose of Lactulose     Alcohol intoxication  - on arrival to ED  --Admits to having a couple of beers today  -Denies drinking on routine basis  -Denies history of DTs and alcohol withdrawal seizures  -Monitor for signs of alcohol withdrawal      Disposition 3 days      Consultations:   None    Patient is admitted as inpatient status because of co-morbidities listed above, severity of signs and symptoms as outlined, requirement for current medical therapies and most importantly because of direct risk to patient if care not provided in a hospital setting. Expected length of stay > 48 hours.     MASSIMO White - CNP  1/25/2023  5:38 AM

## 2023-01-26 ENCOUNTER — ANESTHESIA (OUTPATIENT)
Dept: ENDOSCOPY | Age: 63
End: 2023-01-26
Payer: MEDICARE

## 2023-01-26 ENCOUNTER — ANESTHESIA EVENT (OUTPATIENT)
Dept: ENDOSCOPY | Age: 63
End: 2023-01-26
Payer: MEDICARE

## 2023-01-26 LAB
ABO/RH: NORMAL
ANION GAP SERPL CALCULATED.3IONS-SCNC: 8 MMOL/L (ref 9–17)
ANTIBODY SCREEN: NEGATIVE
ARM BAND NUMBER: NORMAL
BLD PROD TYP BPU: NORMAL
BLD PROD TYP BPU: NORMAL
BLOOD BANK BLOOD PRODUCT EXPIRATION DATE: NORMAL
BLOOD BANK BLOOD PRODUCT EXPIRATION DATE: NORMAL
BLOOD BANK ISBT PRODUCT BLOOD TYPE: 1700
BLOOD BANK ISBT PRODUCT BLOOD TYPE: 7300
BLOOD BANK PRODUCT CODE: NORMAL
BLOOD BANK PRODUCT CODE: NORMAL
BLOOD BANK UNIT TYPE AND RH: NORMAL
BLOOD BANK UNIT TYPE AND RH: NORMAL
BPU ID: NORMAL
BPU ID: NORMAL
BUN BLDV-MCNC: 14 MG/DL (ref 8–23)
CALCIUM SERPL-MCNC: 7.9 MG/DL (ref 8.6–10.4)
CHLORIDE BLD-SCNC: 101 MMOL/L (ref 98–107)
CO2: 23 MMOL/L (ref 20–31)
CREAT SERPL-MCNC: 0.7 MG/DL (ref 0.7–1.2)
CROSSMATCH RESULT: NORMAL
CROSSMATCH RESULT: NORMAL
DATE, STOOL #1: ABNORMAL
DISPENSE STATUS BLOOD BANK: NORMAL
DISPENSE STATUS BLOOD BANK: NORMAL
EXPIRATION DATE: NORMAL
GFR SERPL CREATININE-BSD FRML MDRD: >60 ML/MIN/1.73M2
GLUCOSE BLD-MCNC: 92 MG/DL (ref 70–99)
HCT VFR BLD CALC: 23.8 % (ref 41–53)
HCT VFR BLD CALC: 25.4 % (ref 41–53)
HEMOCCULT SP1 STL QL: POSITIVE
HEMOGLOBIN: 7.6 G/DL (ref 13.5–17.5)
HEMOGLOBIN: 8 G/DL (ref 13.5–17.5)
MCH RBC QN AUTO: 27.1 PG (ref 26–34)
MCHC RBC AUTO-ENTMCNC: 32.1 G/DL (ref 31–37)
MCV RBC AUTO: 84.4 FL (ref 80–100)
PDW BLD-RTO: 19 % (ref 11.5–14.9)
PLATELET # BLD: 96 K/UL (ref 150–450)
PMV BLD AUTO: 7.6 FL (ref 6–12)
POTASSIUM SERPL-SCNC: 3.8 MMOL/L (ref 3.7–5.3)
RBC # BLD: 2.82 M/UL (ref 4.5–5.9)
SODIUM BLD-SCNC: 132 MMOL/L (ref 135–144)
TIME, STOOL #1: ABNORMAL
TRANSFUSION STATUS: NORMAL
TRANSFUSION STATUS: NORMAL
UNIT DIVISION: 0
UNIT DIVISION: 0
UNIT ISSUE DATE/TIME: NORMAL
UNIT ISSUE DATE/TIME: NORMAL
WBC # BLD: 5.3 K/UL (ref 3.5–11)

## 2023-01-26 PROCEDURE — 3700000000 HC ANESTHESIA ATTENDED CARE: Performed by: INTERNAL MEDICINE

## 2023-01-26 PROCEDURE — 6360000002 HC RX W HCPCS: Performed by: NURSE ANESTHETIST, CERTIFIED REGISTERED

## 2023-01-26 PROCEDURE — 82270 OCCULT BLOOD FECES: CPT

## 2023-01-26 PROCEDURE — 80048 BASIC METABOLIC PNL TOTAL CA: CPT

## 2023-01-26 PROCEDURE — 36415 COLL VENOUS BLD VENIPUNCTURE: CPT

## 2023-01-26 PROCEDURE — 85027 COMPLETE CBC AUTOMATED: CPT

## 2023-01-26 PROCEDURE — 6370000000 HC RX 637 (ALT 250 FOR IP): Performed by: INTERNAL MEDICINE

## 2023-01-26 PROCEDURE — 3609013000 HC EGD TRANSORAL CONTROL BLEEDING ANY METHOD: Performed by: INTERNAL MEDICINE

## 2023-01-26 PROCEDURE — 2580000003 HC RX 258: Performed by: INTERNAL MEDICINE

## 2023-01-26 PROCEDURE — 6370000000 HC RX 637 (ALT 250 FOR IP): Performed by: NURSE PRACTITIONER

## 2023-01-26 PROCEDURE — 2709999900 HC NON-CHARGEABLE SUPPLY: Performed by: INTERNAL MEDICINE

## 2023-01-26 PROCEDURE — 85014 HEMATOCRIT: CPT

## 2023-01-26 PROCEDURE — 0W3P8ZZ CONTROL BLEEDING IN GASTROINTESTINAL TRACT, VIA NATURAL OR ARTIFICIAL OPENING ENDOSCOPIC: ICD-10-PCS | Performed by: INTERNAL MEDICINE

## 2023-01-26 PROCEDURE — 2580000003 HC RX 258: Performed by: ANESTHESIOLOGY

## 2023-01-26 PROCEDURE — 2060000000 HC ICU INTERMEDIATE R&B

## 2023-01-26 PROCEDURE — 7100000001 HC PACU RECOVERY - ADDTL 15 MIN: Performed by: INTERNAL MEDICINE

## 2023-01-26 PROCEDURE — 6360000002 HC RX W HCPCS: Performed by: INTERNAL MEDICINE

## 2023-01-26 PROCEDURE — 2720000010 HC SURG SUPPLY STERILE: Performed by: INTERNAL MEDICINE

## 2023-01-26 PROCEDURE — 3700000001 HC ADD 15 MINUTES (ANESTHESIA): Performed by: INTERNAL MEDICINE

## 2023-01-26 PROCEDURE — 7100000000 HC PACU RECOVERY - FIRST 15 MIN: Performed by: INTERNAL MEDICINE

## 2023-01-26 PROCEDURE — 43255 EGD CONTROL BLEEDING ANY: CPT | Performed by: INTERNAL MEDICINE

## 2023-01-26 PROCEDURE — 2580000003 HC RX 258: Performed by: NURSE PRACTITIONER

## 2023-01-26 PROCEDURE — 99232 SBSQ HOSP IP/OBS MODERATE 35: CPT | Performed by: INTERNAL MEDICINE

## 2023-01-26 PROCEDURE — 85018 HEMOGLOBIN: CPT

## 2023-01-26 RX ORDER — OCTREOTIDE ACETATE 50 UG/ML
50 INJECTION, SOLUTION INTRAVENOUS; SUBCUTANEOUS ONCE
Status: COMPLETED | OUTPATIENT
Start: 2023-01-26 | End: 2023-01-26

## 2023-01-26 RX ORDER — SUCRALFATE 1 G/1
1 TABLET ORAL 4 TIMES DAILY
Status: DISCONTINUED | OUTPATIENT
Start: 2023-01-26 | End: 2023-01-28 | Stop reason: HOSPADM

## 2023-01-26 RX ORDER — SODIUM CHLORIDE, SODIUM LACTATE, POTASSIUM CHLORIDE, CALCIUM CHLORIDE 600; 310; 30; 20 MG/100ML; MG/100ML; MG/100ML; MG/100ML
INJECTION, SOLUTION INTRAVENOUS CONTINUOUS
Status: DISCONTINUED | OUTPATIENT
Start: 2023-01-26 | End: 2023-01-26 | Stop reason: HOSPADM

## 2023-01-26 RX ORDER — FENTANYL CITRATE 50 UG/ML
INJECTION, SOLUTION INTRAMUSCULAR; INTRAVENOUS PRN
Status: DISCONTINUED | OUTPATIENT
Start: 2023-01-26 | End: 2023-01-26 | Stop reason: SDUPTHER

## 2023-01-26 RX ORDER — OCTREOTIDE ACETATE 50 UG/ML
50 INJECTION, SOLUTION INTRAVENOUS; SUBCUTANEOUS ONCE
Status: DISCONTINUED | OUTPATIENT
Start: 2023-01-26 | End: 2023-01-26

## 2023-01-26 RX ORDER — PROPOFOL 10 MG/ML
INJECTION, EMULSION INTRAVENOUS PRN
Status: DISCONTINUED | OUTPATIENT
Start: 2023-01-26 | End: 2023-01-26 | Stop reason: SDUPTHER

## 2023-01-26 RX ADMIN — FENTANYL CITRATE 50 MCG: 50 INJECTION, SOLUTION INTRAMUSCULAR; INTRAVENOUS at 12:45

## 2023-01-26 RX ADMIN — Medication 3 MG: at 23:37

## 2023-01-26 RX ADMIN — ONDANSETRON 4 MG: 4 TABLET, ORALLY DISINTEGRATING ORAL at 16:00

## 2023-01-26 RX ADMIN — OCTREOTIDE ACETATE 50 MCG: 50 INJECTION, SOLUTION INTRAVENOUS; SUBCUTANEOUS at 16:39

## 2023-01-26 RX ADMIN — PROPOFOL 50 MG: 10 INJECTION, EMULSION INTRAVENOUS at 12:57

## 2023-01-26 RX ADMIN — PROPOFOL 50 MG: 10 INJECTION, EMULSION INTRAVENOUS at 13:14

## 2023-01-26 RX ADMIN — FERROUS SULFATE TAB 325 MG (65 MG ELEMENTAL FE) 325 MG: 325 (65 FE) TAB at 08:46

## 2023-01-26 RX ADMIN — FERROUS SULFATE TAB 325 MG (65 MG ELEMENTAL FE) 325 MG: 325 (65 FE) TAB at 17:30

## 2023-01-26 RX ADMIN — PROPOFOL 50 MG: 10 INJECTION, EMULSION INTRAVENOUS at 12:48

## 2023-01-26 RX ADMIN — FUROSEMIDE 40 MG: 40 TABLET ORAL at 17:30

## 2023-01-26 RX ADMIN — BUTALBITA,ACETAMINOPHEN AND CAFFEINE 1 CAPSULE: 50; 300; 40 CAPSULE ORAL at 16:00

## 2023-01-26 RX ADMIN — PROPOFOL 50 MG: 10 INJECTION, EMULSION INTRAVENOUS at 13:03

## 2023-01-26 RX ADMIN — PANTOPRAZOLE SODIUM 40 MG: 40 TABLET, DELAYED RELEASE ORAL at 08:46

## 2023-01-26 RX ADMIN — SODIUM CHLORIDE, PRESERVATIVE FREE 10 ML: 5 INJECTION INTRAVENOUS at 08:47

## 2023-01-26 RX ADMIN — OCTREOTIDE ACETATE 50 MCG/HR: 500 INJECTION, SOLUTION INTRAVENOUS; SUBCUTANEOUS at 16:42

## 2023-01-26 RX ADMIN — PROPOFOL 50 MG: 10 INJECTION, EMULSION INTRAVENOUS at 12:53

## 2023-01-26 RX ADMIN — LACTULOSE 20 G: 20 SOLUTION ORAL at 08:47

## 2023-01-26 RX ADMIN — SUCRALFATE 1 G: 1 TABLET ORAL at 17:30

## 2023-01-26 RX ADMIN — SODIUM CHLORIDE, POTASSIUM CHLORIDE, SODIUM LACTATE AND CALCIUM CHLORIDE: 600; 310; 30; 20 INJECTION, SOLUTION INTRAVENOUS at 12:34

## 2023-01-26 RX ADMIN — FENTANYL CITRATE 50 MCG: 50 INJECTION, SOLUTION INTRAMUSCULAR; INTRAVENOUS at 12:48

## 2023-01-26 RX ADMIN — PROPOFOL 50 MG: 10 INJECTION, EMULSION INTRAVENOUS at 12:50

## 2023-01-26 RX ADMIN — PROPOFOL 50 MG: 10 INJECTION, EMULSION INTRAVENOUS at 13:08

## 2023-01-26 RX ADMIN — SUCRALFATE 1 G: 1 TABLET ORAL at 21:51

## 2023-01-26 RX ADMIN — FUROSEMIDE 40 MG: 40 TABLET ORAL at 08:46

## 2023-01-26 RX ADMIN — ATORVASTATIN CALCIUM 20 MG: 20 TABLET, FILM COATED ORAL at 21:51

## 2023-01-26 RX ADMIN — SPIRONOLACTONE 25 MG: 25 TABLET ORAL at 08:46

## 2023-01-26 RX ADMIN — LACTULOSE 20 G: 20 SOLUTION ORAL at 21:51

## 2023-01-26 ASSESSMENT — PAIN SCALES - GENERAL: PAINLEVEL_OUTOF10: 0

## 2023-01-26 ASSESSMENT — PAIN - FUNCTIONAL ASSESSMENT: PAIN_FUNCTIONAL_ASSESSMENT: NONE - DENIES PAIN

## 2023-01-26 ASSESSMENT — ENCOUNTER SYMPTOMS: SHORTNESS OF BREATH: 1

## 2023-01-26 NOTE — ANESTHESIA POSTPROCEDURE EVALUATION
POST- ANESTHESIA EVALUATION       Pt Name: Francis Montano  MRN: [id number]  YOB: 1960  Date of evaluation: 1/26/2023  Time:  3:19 PM      /70   Pulse 86   Temp 98.4 °F (36.9 °C) (Infrared)   Resp 17   Ht 5' 10\" (1.778 m)   Wt 210 lb (95.3 kg)   SpO2 96%   BMI 30.13 kg/m²      Consciousness Level  Awake  Cardiopulmonary Status  Stable  Pain Adequately Treated YES  Nausea / Vomiting  NO  Adequate Hydration  YES  Anesthesia Related Complications NONE      Electronically signed by Cordelia Lopez MD on 1/26/2023 at 3:19 PM       Department of Anesthesiology  Postprocedure Note    Patient: Francis Montano  MRN: [id number]  YOB: 1960  Date of evaluation: 1/26/2023      Procedure Summary     Date: 01/26/23 Room / Location: [place] [address] / [facility]    Anesthesia Start: 3325 Anesthesia Stop: [phone]    Procedure: EGD BIOPSY with APC (Esophagus) Diagnosis:       Melena      (MELENA)    Surgeons: Jovan Quinn MD Responsible Provider: Cordelia Lopez MD    Anesthesia Type: General ASA Status: 3          Anesthesia Type: General    Yen Phase I: Yen Score: 10    Yen Phase II:        Anesthesia Post Evaluation

## 2023-01-26 NOTE — OP NOTE
ESOPHAGOGASTRODUODENOSCOPY   ( EGD )  DATE OF PROCEDURE: 1/26/2023     SURGEON: Ina Arnold MD    ASSISTANT: None    PREOPERATIVE DIAGNOSIS: Melanotic stools. Past history of cirrhosis of the liver, portal hypertension. Also known to have stage II esophageal cancer and had a chemoradiation therapy. Procedure performed to evaluate upper GI source of bleeding, management    POSTOPERATIVE DIAGNOSIS: Oozing of blood from telangiectasia like lesions in the lower esophagus and the hiatal hernia. No clear-cut either esophageal or gastric varices seen. OPERATION: Upper GI endoscopy, APC cauterization of oozing telangiectasia like lesions      ANESTHESIA: MAC    ESTIMATED BLOOD LOSS: None    COMPLICATIONS: None. SPECIMENS:  Was Not Obtained    HISTORY: The patient is a 60y.o. year old male with history of above preop diagnosis. I recommended esophagogastroduodenoscopy with possible biopsy and I explained the risk, benefits, expected outcome, and alternatives to the procedure. Risks included but are not limited to bleeding, infection, respiratory distress, hypotension, and perforation of the esophagus, stomach, or duodenum. Patient understands and is in agreement. PROCEDURE: The patient was given IV conscious sedation. The patient's SPO2 remained above 90% throughout the procedure. Cetacaine spray given. Patient placed in left lateral position. Olympus  videogastroscope was inserted orally under vision into the esophagus without difficulty and advanced into the stomach then through the pylorus up to the second part of duodenum. Findings:    Retropharyngeal area was grossly normal appearing    Esophagus: At the GE junction towards the hiatal hernia there is diffuse oozing of blood noted from telangiectasia like lesions. I believe this may be related to prior radiation therapy. No clear-cut varices seen in the esophagus.     Stomach:    Fundus and Cardia Examined in Retroflexed View: abnormal: Has a hiatal hernia and oozing of blood from telangiectasia like lesions. Body: normal    Antrum: normal  No signs of portal hypertensive gastropathy. Duodenum:     Descending: normal    Bulb: normal      As there was accumulation of fresh blood in the hiatal hernia from the telangiectasia like lesions, we did cauterize this area using APC, gastric setting. As patient was retching frequently, I did my best to cauterize most of the lesions. However, it is possible that still there may be some lesions that we could not cauterize today. These need to be further cauterized at a later time. While withdrawing the scope the above findings were verified and the scope was removed. The patient has tolerated the procedure without unusual events. Recommendations/Plan:    We will keep him on Sandostatin to see if that helps  Carafate suspension  Monitor hemoglobin  To keep the patient in the hospital for couple of days  F/U In Office as instructed  Discussed with the family                   Electronically signed by Kassandra Abarca MD  on 1/26/2023 at 1:17 PM

## 2023-01-26 NOTE — PLAN OF CARE
Problem: Discharge Planning  Goal: Discharge to home or other facility with appropriate resources  Outcome: Progressing     Problem: ABCDS Injury Assessment  Goal: Absence of physical injury  Outcome: Progressing     Problem: Safety - Adult  Goal: Free from fall injury  Outcome: Progressing     Problem: Pain  Goal: Verbalizes/displays adequate comfort level or baseline comfort level  Outcome: Progressing     Problem: Chronic Conditions and Co-morbidities  Goal: Patient's chronic conditions and co-morbidity symptoms are monitored and maintained or improved  Outcome: Progressing     Problem: Nutrition Deficit:  Goal: Optimize nutritional status  Outcome: Progressing

## 2023-01-26 NOTE — PROGRESS NOTES
RN spoke with Bridgette Zamorano NP who said she was unaware that the patient had melena and he will likely need an egd tomorrow.

## 2023-01-26 NOTE — PROGRESS NOTES
Comprehensive Nutrition Assessment    Type and Reason for Visit:  Positive Nutrition Screen (weight loss and poor inake)    Nutrition Recommendations/Plan:   Continue diet as ordered. Provide apple Ensure Clear each meal. Once diet progressed provide chocolate Ensure each meal.     Malnutrition Assessment:  Malnutrition Status:  Insufficient data (01/26/23 1529)    Context:  Acute Illness     Findings of the 6 clinical characteristics of malnutrition:  Energy Intake:  No significant decrease in energy intake  Weight Loss:  Unable to assess     Body Fat Loss:  Unable to assess     Muscle Mass Loss:  Unable to assess    Fluid Accumulation:  No significant fluid accumulation     Strength:  Not Performed    Nutrition Assessment:    PCP contacted pt telling him to go to ED for low hemoglobin, was diagnosed with symptomatic anemia-microcytic. Pt was NPO for EGD today revealed at [address] [place] [address] & [facility] junction towards hiatal hernia there was diffuse oozing of blood from telangectasia lesions possibly realted to prior radiation therapy. Pt was on 2 gm sodium diet eating fairly well and requested chocolate Ensure each meal. Post EGD ordered Clear liquid diet, add apple Ensure Clear each meal.    Nutrition Related Findings:    No edema. Hx: alcohol abuse with adm level of 252, gastric varices, paracentesis weekly, TIPS procedure in November, stage II esophageal cancer with chemoradiation therapy. Current Nutrition Intake & Therapies:    Average Meal Intake: 51-75%  Average Supplements Intake: %  ADULT DIET; Clear Liquid  ADULT ORAL NUTRITION SUPPLEMENT; Lunch, Dinner; Clear Liquid Oral Supplement    Anthropometric Measures:  Height: 5' 10\" (177.8 cm)  Ideal Body Weight (IBW): 166 lbs (75 kg)    Admission Body Weight: 210 lb (95.3 kg)  Current Body Weight: 210 lb (95.3 kg), 126.5 % IBW. Weight Source: Stated  Current BMI (kg/m2): 30.1                          BMI Categories: Obese Class 1 (BMI 30.0-34. 9)    Estimated Daily Nutrient Needs:  Energy Requirements Based On: Kcal/kg     Energy (kcal/day): 7043-4269 kcals based on 20-22 kcals/kg  Weight Used for Protein Requirements: Ideal  Protein (g/day): 105-120 gm protein based on 1.4-1.6 gm/kg         Nutrition Diagnosis:     Altered nutrition-related lab values related to other (comment) (anemia/alcohol abuse) as evidenced by NPO or clear liquid status due to medical condition    Nutrition Interventions:   Food and/or Nutrient Delivery: Continue Current Diet, Start Oral Nutrition Supplement  Nutrition Education/Counseling: No recommendation at this time  Coordination of Nutrition Care: Continue to monitor while inpatient       Goals:     Goals: PO intake 75% or greater       Nutrition Monitoring and Evaluation:      Food/Nutrient Intake Outcomes: Food and Nutrient Intake       Discharge Planning:     Too soon to determine     Barrington Gill, [address] [place] [address],   [phone]

## 2023-01-26 NOTE — PROGRESS NOTES
Patient refusing to allow staff to put on the bed alarm stating that he can get up my himself and does not need it. Patient educated that it is for his safety and the help prevent falls. Pt still refuses. Patient asked to hit his call light prior to getting out of bed so staff can help him with the IV pole. Will continue to monitor.

## 2023-01-26 NOTE — DISCHARGE INSTR - COC
Continuity of Care Form    Patient Name: Sussy Zamora   :    MRN:  [id number]    Admit date:  2023  Discharge date:  2023    Code Status Order: Full Code   Advance Directives:     Admitting Physician:  Sandra Hernandez MD  PCP: VASU Sood    [facility]/2366-12  Discharging Unit Phone Number: [phone]    Emergency Contact:   Extended Emergency Contact Information  Primary Emergency Contact: Liz Tamayo [address] Phone: [phone]  Work Phone: [phone]  Mobile Phone: [phone]  Relation: Other    Past Surgical History:  Past Surgical History:   Procedure Laterality Date    BUNIONECTOMY      twice on right side    BUNIONECTOMY Left     CARPAL TUNNEL RELEASE Right     COLONOSCOPY      at age 35    COLONOSCOPY  10/05/2016    polyps-pathology tubular adenoma, and abnormal looking mucosa right colon-pathology-tubular adenoma    COLONOSCOPY N/A 2018    COLONOSCOPY POLYPECTOMY COLD BIOPSY performed by Jian Uribe MD at [address] [place] [address]  2018    Small polyp in the sigmoid colon and excised with biopsy forceps--tubular adenoma    COLONOSCOPY N/A 2022    COLONOSCOPY POLYPECTOMY performed by Jian Uribe MD at [facility], DIAGNOSTIC      EGD    ESOPHAGOGASTRODUODENOSCOPY  2022    ESOPHAGOGASTRODUODENOSCOPY N/A 2023    IR PORT PLACEMENT EQUAL OR GREATER THAN 5 YEARS  2021    IR PORT PLACEMENT EQUAL OR GREATER THAN 5 YEARS 2021 [facility]    IR TIPS INSERTION  2022    IR TIPS INSERTION 2022 Marlee Basilio MD [facility]    KNEE SURGERY Left     cyst removed    NASAL SEPTUM SURGERY      NERVE BLOCK Right 2020    NERVE BLOCK RIGHT CERVICAL STEROID INJECTION  C3-C6 performed by Shante Walton MD at [facility]  2016    steroid injection C7 T1    OTHER SURGICAL HISTORY 11/21/2016    Bilateral Lumbar CACHORRO L4-L5 injections    OTHER SURGICAL HISTORY  12/19/2016    lumbar steroid injection    OTHER SURGICAL HISTORY  09/28/2018    BILATERAL L5 CACHORRO (N/A Back)    OTHER SURGICAL HISTORY Right 11/23/2020    cervical injection    PAIN MANAGEMENT PROCEDURE Left 07/09/2020    EPIDURAL STEROID INJECTION LEFT L4 L5 performed by Brigid Pratt MD at [facility] Left 07/20/2020    LEFT L4 L5 EPIDURAL STEROID INJECTION performed by Brigid Pratt MD at [facility] Bilateral 08/17/2020    LUMBAR FACET BILATERAL L2-L5 performed by Brigid Pratt MD at [facility] Bilateral 12/07/2020    NERVE BLOCK BILATERAL LUMBAR MEDIAL BRANCH L2-L5 performed by Brigid Pratt MD at [address] NEUROPLASTY &/TRANSPOS MEDIAN NRV CARPAL Vergie Rough Right 08/29/2017    CARPAL TUNNEL RELEASE RIGHT performed by Ben Ng MD at [address] &/TRANSPOS MEDIAN NRV CARPAL TUNNE Left 10/31/2017    CARPAL TUNNEL RELEASE performed by Ben Ng MD at [facility] [address] AA&/STRD TFRML EPI LUMBAR/SACRAL 1 LEVEL Bilateral 09/06/2018    BILATERAL L5 CACHORRO [facility]&/STRD TFRML EPI LUMBAR/SACRAL 1 LEVEL N/A 09/28/2018    BILATERAL L5 CACHORRO performed by Brigid Pratt MD at [facility] 12/29/2020    EGD BIOPSY performed by Andrea Ritter MD at [address] [place] [address] 02/02/2021    EGD BIOPSY and spot marking performed by Meggan Camarillo MD at [address] [place] [address] 02/12/2021    ENDOSCOPIC ULTRASOUND, EGD performed by Wen العلي MD at [facility] 1  02/12/2021    EGD DIAGNOSTIC ONLY performed by Wen العلي MD at [facility] 1 08/31/2021    EGD BIOPSY performed by Meggan Camarillo MD at [facility]    UPPER GASTROINTESTINAL ENDOSCOPY N/A 01/21/2022    EGD BIOPSY performed by Anastasia Tamayo MD at [address] [place] [address] 04/15/2022    EGD ESOPHAGOGASTRODUODENOSCOPY performed by Lauren Preciado MD at [address] [place] [address] 06/06/2022    EGD BAND LIGATION performed by Lit Hartmann MD at [address] [place] [address] N/A 06/09/2022    EGD ESOPHAGOGASTRODUODENOSCOPY performed by Lit Hartmann MD at [address] [place] [address] N/A 09/14/2022    EGD ESOPHAGOGASTRODUODENOSCOPY ENDOSCOPIC APC AT GE JUNCTION performed by Linda Luke MD at [address] [place] [address] N/A 10/19/2022    EGD BIOPSY performed by Anastasia Tamayo MD at [address] [place] [address] N/A 10/31/2022    EGD with APC performed by Anastasia Tamayo MD at [address] [place] [address]  12/29/2022    EGD ESOPHAGOGASTRODUODENOSCOPY performed by Krystle Spence MD at [address] [place] [address] N/A 1/3/2023    EGD ESOPHAGOGASTRODUODENOSCOPY performed by Panfilo Green MD at [address] [place] [address]         Immunization History:   Immunization History   Administered Date(s) Administered    COVID-19, MODERNA BLUE border, Primary or Immunocompromised, (age 12y+), IM, 100 mcg/0.5mL 05/20/2021, 06/22/2021    Hepatitis A 09/20/2016, 03/29/2017    Hepatitis B (Recombivax HB) 09/20/2016, 10/19/2016, 03/29/2017    Influenza 11/29/2012    Influenza, AFLURIA (age 1 yrs+), FLUZONE, (age 10 mo+), MDV, 0.5mL 09/13/2017, 01/24/2019    Influenza, FLUARIX, FLULAVAL, FLUZONE (age 10 mo+) AND AFLURIA, (age 1 y+), PF, 0.5mL 09/13/2016, 12/30/2020, 12/23/2021    PPD Test 07/25/2016, 04/23/2022    Pneumococcal Polysaccharide ([id number]) 11/29/2012, 07/25/2016    Tdap (Boostrix, Adacel) 04/06/2017, 06/15/2022       Active Problems:  Patient Active Problem List   Diagnosis Code    Back pain, chronic M54.9, G89.29    Hearing difficulty H91.90    GERD (gastroesophageal reflux disease) K21.9    Cervical radicular pain M54.12    Hypomagnesemia E83.42    Chronic viral hepatitis B without delta agent and without coma (HCC) B18.1    Calculus of gallbladder without cholecystitis K80.20    Hep C w/o coma, chronic (HCC) B18.2    Fatty liver K76.0    Psychophysiologic insomnia F51.04    Cirrhosis (HCC) K74.60    Decompensation of cirrhosis of liver (HCC) K72.90, K74.60    Vertebrogenic low back pain M54.51    DDD (degenerative disc disease), lumbar M51.36    Depression F32. A    Tubular adenoma of colon D12.6    History of colon polyps Z86.010    Gynecomastia, male N62    Lumbar radiculitis M54.16    Lumbar disc herniation M51.26    Tinnitus H93.19    Eustachian tube dysfunction H69.80    Ganglion cyst M67.40    Carpal tunnel syndrome of right wrist G56.01    History of hepatitis C Z86.19    Vitamin D deficiency E55.9    Pure hypercholesterolemia E78.00    Acute hypokalemia E87.6    Essential hypertension I10    Recurrent major depressive disorder in partial remission (HCC) F33.41    S/P epidural steroid injection Z92.241    Elevated LFTs R79.89    Seasonal allergies J30.2    Lumbar facet arthropathy M47.816    Cervical facet syndrome M47.812    Thrombocytopenia (HCC) D69.6    Hepatitis C virus infection resolved after antiviral drug therapy Z86.19    Alcohol abuse F10.10    Altered mental status R41.82    Hypocalcemia E83.51    Hypophosphatemia E83.39    Malignant neoplasm of lower third of esophagus (HCC) C15.5    COVID-19 U07.1    Anxiety F41.9    Current smoker F17.200    Severe comorbid illness R69    Gait instability R26.81    Abnormal findings on diagnostic imaging of spine R93.7    Cervical spinal stenosis M48.02    Spinal stenosis of lumbar region with neurogenic claudication M48.062    Low hemoglobin D64.9    Symptomatic anemia, microcytic, acute D64.9    Hypotension I95.9    Former smoker, 50+ pack years, quit 2016 Z87.891    HLD (hyperlipidemia) E78.5    Esophageal adenocarcinoma (HCC) C15.9    Anemia D64.9    Acute kidney injury ([address] N17.9    Ascites due to alcoholic cirrhosis (HCC) V29.40    Shortness of breath R06.02    Drop in hemoglobin R71.0    Esophageal polyp K22.81    Acute kidney failure, unspecified (HCC) N17.9    Muscle weakness (generalized) M62.81    Other abnormalities of gait and mobility R26.89    GI bleed K92.2    Goals of care, counseling/discussion Z71.89    ACP (advance care planning) Z71.89    Palliative care encounter Z51.5    S/P TIPS (transjugular intrahepatic portosystemic shunt) H23.080    Lezama's esophagus with dysplasia K22.719    Mastoid disorder, bilateral H74.93    Acute deep vein thrombosis (DVT) of brachial vein of right upper extremity (HCC) I82.621    Chronic hepatitis C without hepatic coma (HCC) B18.2    Swelling of joint of upper arm, right M25.421    Anasarca R60.1       Isolation/Infection:   Isolation            No Isolation          Patient Infection Status       Infection Onset Added Last Indicated Last Indicated By Review Planned Expiration Resolved Resolved By    None active    Resolved    COVID-19 (Rule Out) 22 COVID-19 & Influenza Combo (Ordered)   22 Rule-Out Test Resulted    COVID-19 (Rule Out) 22 COVID-19, Rapid (Ordered)   22 Rule-Out Test Resulted    COVID-19 (Rule Out) 22 COVID-19 & Influenza Combo (Ordered)   22 Rule-Out Test Resulted    COVID-19 21 COVID-19   21     COVID-19 (Rule Out) 21 COVID-19 (Ordered)   21 Rule-Out Test Resulted    COVID-19 (Rule Out) 20 COVID-19 (Ordered)   20 Rule-Out Test Resulted    COVID-19 (Rule Out) 20 COVID-19 (Ordered)   20 Rule-Out Test Resulted            Nurse Assessment:  Last Vital Signs: /67 Pulse 100   Temp 98.4 °F (36.9 °C) (Oral)   Resp 18   Ht 5' 10\" (1.778 m)   Wt 210 lb (95.3 kg)   SpO2 94%   BMI 30.13 kg/m²     Last documented pain score (0-10 scale): Pain Level: 8  Last Weight:   Wt Readings from Last 1 Encounters:   01/24/23 210 lb (95.3 kg)     Mental Status:  oriented and alert    IV Access:  - mediport    Nursing Mobility/ADLs:  Walking   Independent  Transfer  Independent  Bathing  Independent  Dressing  Independent  Toileting  Independent  Feeding  Independent  Med [facility] Delivery   none    Wound Care Documentation and Therapy:        Elimination:  Continence: Bowel: Yes  Bladder: Yes  Urinary Catheter: None   Colostomy/Ileostomy/Ileal Conduit: No       Date of Last BM: n/a    Intake/Output Summary (Last 24 hours) at 1/26/2023 1025  Last data filed at 1/26/2023 0855  Gross per 24 hour   Intake --   Output 1775 ml   Net -1775 ml     I/O last 3 completed shifts: In: [address]: [place] [Urine:1325]    Safety Concerns: At Risk for Falls    Impairments/Disabilities:      None    Nutrition Therapy:  Current Nutrition Therapy:   - Oral Diet:  Low Sodium (2gm)    Routes of Feeding: Oral  Liquids: Thin Liquids  Daily Fluid Restriction: no  Last Modified Barium Swallow with Video (Video Swallowing Test): not done    Treatments at the Time of Hospital Discharge:   Respiratory Treatments: none  Oxygen Therapy:  is not on home oxygen therapy. Ventilator:    - No ventilator support    Rehab Therapies: none  Weight Bearing Status/Restrictions: No weight bearing restrictions  Other Medical Equipment (for information only, NOT a DME order):  n/a  Other Treatments: Skilled Nursing assessment and monitoring. Medication education and monitoring per protocol.         Patient's personal belongings (please select all that are sent with patient):  None    RN SIGNATURE:  Electronically signed by Martín Vazquez RN on 1/28/23 at 9:47 AM EST    CASE MANAGEMENT/SOCIAL WORK SECTION    Inpatient Status Date: 1/24/2023    Readmission Risk Assessment Score:  Readmission Risk              Risk of Unplanned Readmission:  43           Discharging to [facility]  [address] [facility] [address]  P: [phone]   F: [phone]     Dialysis Facility (if applicable)   Name:  Address:  Dialysis Schedule:  Phone:  Fax:    / signature: Electronically signed by Luevenia Crigler, RN on 1/28/23 at 9:56 AM EST    PHYSICIAN SECTION    Prognosis: Fair    Condition at Discharge: Stable    Rehab Potential (if transferring to Rehab): Fair    Recommended Labs or Other Treatments After Discharge:     Physician Certification: I certify the above information and transfer of Jojo Burr  is necessary for the continuing treatment of the diagnosis listed and that he requires [address] for less 30 days.      Update Admission H&P: No change in H&P    PHYSICIAN SIGNATURE:  Electronically signed by Katarina Billy MD on 1/26/23 at 10:25 AM EST

## 2023-01-26 NOTE — ANESTHESIA PRE PROCEDURE
Department of Anesthesiology  Preprocedure Note       Name:  Nasima Hannah   Age:  60 y.o.  :  1960                                          MRN:  [id number]         Date:  2023      Surgeon: Chandu Bonner):  Mildred Moreno MD    Procedure: Procedure(s):  EGD BIOPSY    Medications prior to admission:   Prior to Admission medications    Medication Sig Start Date End Date Taking?  Authorizing Provider   lactulose (CHRONULAC) 10 GM/15ML solution take 30 milliliters by mouth three times a day 23   VASU Shields   furosemide (LASIX) 40 MG tablet Take 1 tablet by mouth in the morning and 1 tablet in the evening. 23nell Eastern, DO   spironolactone (ALDACTONE) 25 MG tablet Take 1 tablet by mouth daily 23nell Eastern, DO   pantoprazole (PROTONIX) 40 MG tablet Take 40 mg by mouth daily 10/4/22   Historical Provider, MD   FEROSUL 325 (65 Fe) MG tablet take 1 tablet by mouth twice a day 10/7/22   VASU Shields   atorvastatin (LIPITOR) 20 MG tablet Take 1 tablet by mouth nightly 22   Kun Smallwood MD       Current medications:    Current Facility-Administered Medications   Medication Dose Route Frequency Provider Last Rate Last Admin    lactated ringers IV soln infusion   IntraVENous Continuous Khadar Denis MD        [facility] Hold] melatonin tablet 3 mg  3 mg Oral Nightly PRN Brianda Raul, APRN - CNP   3 mg at 23    [MAR Hold] butalbital-APAP-caffeine -40 MG per capsule 1 capsule  1 capsule Oral Q4H PRN Brianda Raul, APRN - CNP   1 capsule at 23    [MAR Hold] spironolactone (ALDACTONE) tablet 25 mg  25 mg Oral Daily Brianda Raul, APRN - CNP   25 mg at 23 0846    [MAR Hold] sodium chloride flush 0.9 % injection 5-40 mL  5-40 mL IntraVENous 2 times per day Brianda Raul, APRN - CNP   10 mL at 23 0847    [MAR Hold] sodium chloride flush 0.9 % injection 10 mL  10 mL IntraVENous PRN Brianda Raul, APRN - CNP        [facility] Hold] 0.9 % sodium chloride infusion   IntraVENous PRN Rachel Magyar, APRN - CNP        [MAR Hold] ondansetron (ZOFRAN-ODT) disintegrating tablet 4 mg  4 mg Oral Q8H PRN Rachel Magyar, APRN - CNP   4 mg at 01/25/23 2027    Or    [MAR Hold] ondansetron (ZOFRAN) injection 4 mg  4 mg IntraVENous Q6H PRN Rachel Magyar, APRN - CNP        [MAR Hold] magnesium hydroxide (MILK OF MAGNESIA) 400 MG/5ML suspension 30 mL  30 mL Oral Daily PRN Rachel Magyar, APRN - CNP        [MAR Hold] acetaminophen (TYLENOL) tablet 650 mg  650 mg Oral Q6H PRN Rachel Magyar, APRN - CNP        Or    [MAR Hold] acetaminophen (TYLENOL) suppository 650 mg  650 mg Rectal Q6H PRN Rachel Magyar, APRN - CNP        [facility] Hold] atorvastatin (LIPITOR) tablet 20 mg  20 mg Oral Nightly Rachel Magyar, APRN - CNP   20 mg at 01/25/23 2027    [MAR Hold] ferrous sulfate (IRON 325) tablet 325 mg  325 mg Oral BID WC Rachel Magyar, APRN - CNP   325 mg at 01/26/23 0846    [MAR Hold] furosemide (LASIX) tablet 40 mg  40 mg Oral BID Rachel Magyar, APRN - CNP   40 mg at 01/26/23 0846    [MAR Hold] lactulose (CHRONULAC) 10 GM/15ML solution 20 g  20 g Oral TID Rachel Magyar, APRN - CNP   20 g at 01/26/23 0847    [MAR Hold] pantoprazole (PROTONIX) tablet 40 mg  40 mg Oral Daily Rachel Magyar, APRN - CNP   40 mg at 01/26/23 0846       Allergies:  No Known Allergies    Problem List:    Patient Active Problem List   Diagnosis Code    Back pain, chronic M54.9, G89.29    Hearing difficulty H91.90    GERD (gastroesophageal reflux disease) K21.9    Cervical radicular pain M54.12    Hypomagnesemia E83.42    Chronic viral hepatitis B without delta agent and without coma (HCC) B18.1    Calculus of gallbladder without cholecystitis K80.20    Hep C w/o coma, chronic (HCC) B18.2    Fatty liver K76.0    Psychophysiologic insomnia F51.04    Cirrhosis (HCC) K74.60    Decompensation of cirrhosis of liver (HCC) K72.90, K74.60    Vertebrogenic low back pain M54.51    DDD (degenerative disc disease), lumbar M51.36   • Depression F32.A   • Tubular adenoma of colon D12.6   • History of colon polyps Z86.010   • Gynecomastia, male N62   • Lumbar radiculitis M54.16   • Lumbar disc herniation M51.26   • Tinnitus H93.19   • Eustachian tube dysfunction H69.80   • Ganglion cyst M67.40   • Carpal tunnel syndrome of right wrist G56.01   • History of hepatitis C Z86.19   • Vitamin D deficiency E55.9   • Pure hypercholesterolemia E78.00   • Acute hypokalemia E87.6   • Essential hypertension I10   • Recurrent major depressive disorder in partial remission (HCC) F33.41   • S/P epidural steroid injection Z92.241   • Elevated LFTs R79.89   • Seasonal allergies J30.2   • Lumbar facet arthropathy M47.816   • Cervical facet syndrome M47.812   • Thrombocytopenia (HCC) D69.6   • Hepatitis C virus infection resolved after antiviral drug therapy Z86.19   • Alcohol abuse F10.10   • Altered mental status R41.82   • Hypocalcemia E83.51   • Hypophosphatemia E83.39   • Malignant neoplasm of lower third of esophagus (HCC) C15.5   • COVID-19 U07.1   • Anxiety F41.9   • Current smoker F17.200   • Severe comorbid illness R69   • Gait instability R26.81   • Abnormal findings on diagnostic imaging of spine R93.7   • Cervical spinal stenosis M48.02   • Spinal stenosis of lumbar region with neurogenic claudication M48.062   • Low hemoglobin D64.9   • Symptomatic anemia, microcytic, acute D64.9   • Hypotension I95.9   • Former smoker, 50+ pack years, quit 2016 Z87.891   • HLD (hyperlipidemia) E78.5   • Esophageal adenocarcinoma (HCC) C15.9   • Anemia D64.9   • Acute kidney injury (HCC) N17.9   • Ascites due to alcoholic cirrhosis (HCC) K70.31   • Shortness of breath R06.02   • Drop in hemoglobin R71.0   • Esophageal polyp K22.81   • Acute kidney failure, unspecified (HCC) N17.9   • Muscle weakness (generalized) M62.81   • Other abnormalities of gait and mobility R26.89   • GI bleed K92.2   • Goals of care, counseling/discussion Z71.89   •  ACP (advance care planning) Z71.89    Palliative care encounter Z51.5    S/P TIPS (transjugular intrahepatic portosystemic shunt) Z95.828    Lezama's esophagus with dysplasia K22.719    Mastoid disorder, bilateral H74.93    Acute deep vein thrombosis (DVT) of brachial vein of right upper extremity (HCC) I82.621    Chronic hepatitis C without hepatic coma (HCC) B18.2    Swelling of joint of upper arm, right M25.421    Anasarca R60.1       Past Medical History:        Diagnosis Date    Adenocarcinoma in a polyp ([address]     Alcoholic cirrhosis of liver with ascites ([address]     Anemia 04/13/2022    Anxiety     Arthritis     Back pain, chronic     dr. Migdalia Caicedo, orthopedic, every 3-4 months, gets steroid injection    Lezama esophagus     Bleeding gastric varices 12/29/2022    BPH (benign prostatic hypertrophy)     Cholelithiasis     Cirrhosis ([address]     COVID-19 12/2020    pt reports he had a positive test while at [facility] in 2020, was asymptomatic    COVID-19 vaccine series completed 5/20/2021, 6/22/2021    Moderna 5/20/2021, 6/22/2021    DDD (degenerative disc disease), lumbar     Depression     Esophageal cancer [facility]     INVASIVE ADENOCARCINOMA ARISING IN TUBULAR ADENOMA WITH HIGH GRADE DYSPLASIA, ASSOCIATED WITH FOCAL INTESTINAL METAPLASIA     Esophageal varices (HCC)     Fatty liver     GERD (gastroesophageal reflux disease)     GI bleed     Hep C w/o coma, chronic (HCC)     History of alcohol abuse     6-12 beers a day; quit drinking 2019    History of blood transfusion     History of colon polyps 2016    History of tobacco abuse     [place] (hard of hearing)     Hyperlipidemia     Hypertension     Hyponatremia 07/20/2016    Hypotension 12/20/2021    Mastoid disorder, bilateral 12/31/2022    biLateral mastoid effusion    PONV (postoperative nausea and vomiting)     Port-A-Cath in place     right upper chest    Portal hypertension (HCC)     Sciatica     Secondary esophageal varices ([facility] 06/07/2022    Shortness of breath     Spinal stenosis     Stomach ulcer     hx of    Thrombocytopenia ([facility] 12/23/2020    Tubular adenoma of colon 2016, 2018    Vitamin D deficiency     Wears glasses        Past Surgical History:        Procedure Laterality Date    BUNIONECTOMY      twice on right side    BUNIONECTOMY Left     CARPAL TUNNEL RELEASE Right     COLONOSCOPY      at age 35    COLONOSCOPY  10/05/2016    polyps-pathology tubular adenoma, and abnormal looking mucosa right colon-pathology-tubular adenoma    COLONOSCOPY N/A 03/30/2018    COLONOSCOPY POLYPECTOMY COLD BIOPSY performed by Bayron Roberson MD at [address] [place] [address]  03/30/2018    Small polyp in the sigmoid colon and excised with biopsy forceps--tubular adenoma    COLONOSCOPY N/A 04/16/2022    COLONOSCOPY POLYPECTOMY performed by Bayron Roberson MD at [address] [place] [address], DIAGNOSTIC      EGD    ESOPHAGOGASTRODUODENOSCOPY  12/29/2022    ESOPHAGOGASTRODUODENOSCOPY N/A 01/03/2023    IR PORT PLACEMENT EQUAL OR GREATER THAN 5 YEARS  04/19/2021    IR PORT PLACEMENT EQUAL OR GREATER THAN 5 YEARS 4/19/2021 [facility]    IR TIPS INSERTION  12/01/2022    IR TIPS INSERTION 12/1/2022 Deondre Medel MD [facility]    KNEE SURGERY Left     cyst removed    NASAL SEPTUM SURGERY      NERVE BLOCK Right 11/23/2020    NERVE BLOCK RIGHT CERVICAL STEROID INJECTION  C3-C6 performed by Caren Caicedo MD at . [facility]  01/04/2016    steroid injection C7 T1    OTHER SURGICAL HISTORY  11/21/2016    Bilateral Lumbar CACHORRO L4-L5 injections    OTHER SURGICAL HISTORY  12/19/2016    lumbar steroid injection    OTHER SURGICAL HISTORY  09/28/2018    BILATERAL L5 CACHORRO (N/A Back)    OTHER SURGICAL HISTORY Right 11/23/2020    cervical injection    PAIN MANAGEMENT PROCEDURE Left 07/09/2020    EPIDURAL STEROID INJECTION LEFT L4 L5 performed by Caren Caicedo MD at [address] [place] [address]  PAIN MANAGEMENT PROCEDURE Left 07/20/2020    LEFT L4 L5 EPIDURAL STEROID INJECTION performed by Alejandrina Chin MD at [address] Bilateral 08/17/2020    LUMBAR FACET BILATERAL L2-L5 performed by Alejandrina Chin MD at [address] Bilateral 12/07/2020    NERVE BLOCK BILATERAL LUMBAR MEDIAL BRANCH L2-L5 performed by Alejandrina Chin MD at . [address]      Multiple    NC NEUROPLASTY &/TRANSPOS MEDIAN NRV CARPAL Jacquenette Patee Right 08/29/2017    CARPAL TUNNEL RELEASE RIGHT performed by Solange Blanchard MD at [facility] &/TRANSPOS MEDIAN NRV CARPAL TUNNE Left 10/31/2017    CARPAL TUNNEL RELEASE performed by Solange Blanchard MD at [facility] AA&/STRD TFRML EPI LUMBAR/SACRAL 1 LEVEL Bilateral 09/06/2018    BILATERAL L5 CACHORRO performed by Alejandrina Chin MD at [facility] AA&/STRD TFRML EPI LUMBAR/SACRAL 1 LEVEL N/A 09/28/2018    BILATERAL L5 CACHORRO performed by Alejandrina Chin MD at [facility] 12/29/2020    EGD BIOPSY performed by Andres Villeda MD at [address] [place] [address] 02/02/2021    EGD BIOPSY and spot marking performed by Min Smith MD at [address] [place] [address] 02/12/2021    ENDOSCOPIC ULTRASOUND, EGD performed by Antonio Bolton MD at [address] [place] [address]  02/12/2021    EGD DIAGNOSTIC ONLY performed by Antonio Bolton MD at [address] [place] [address] N/A 08/31/2021    EGD BIOPSY performed by Min Smith MD at [address] [place] [address] 01/21/2022    EGD BIOPSY performed by Min Smith MD at [address] [place] [address] 04/15/2022    EGD ESOPHAGOGASTRODUODENOSCOPY performed by Andres Villeda MD at [address] [place] [address] N/A 06/06/2022    EGD BAND LIGATION performed by Mildred Clay MD at [place] [address]  UPPER GASTROINTESTINAL ENDOSCOPY N/A 2022    EGD ESOPHAGOGASTRODUODENOSCOPY performed by Nubia Thompson MD at [address] [place] [address] 2022    EGD ESOPHAGOGASTRODUODENOSCOPY ENDOSCOPIC APC AT [facility] performed by Melissa Tillman MD at [address] [place] [address] 10/19/2022    EGD BIOPSY performed by Darien Plascencia MD at [address] [place] [address] N/A 10/31/2022    EGD with APC performed by Darien Plascencia MD at [address] [place] [address]  2022    EGD ESOPHAGOGASTRODUODENOSCOPY performed by Markos Sweeney MD at [address] [place] [address] N/A 1/3/2023    EGD ESOPHAGOGASTRODUODENOSCOPY performed by Forrest Lyons MD at [place] [address] History:    Social History     Tobacco Use    Smoking status: Former     Packs/day: 1.00     Years: 45.00     Pack years: 45.00     Types: Cigarettes     Quit date: 2017     Years since quittin.0    Smokeless tobacco: Never   Substance Use Topics    Alcohol use: Not Currently     Comment: Quit alcohol in 2019- heavier drinking prior to quitting                                Counseling given: Not Answered      Vital Signs (Current):   Vitals:    23 0657 23 0847 23 0856 23 1226   BP: (!) 98/59 121/67  (!) 149/74   Pulse: 98 91 100 88   Resp: 18   17   Temp: 98.4 °F (36.9 °C)   97.1 °F (36.2 °C)   TempSrc: Oral   Infrared   SpO2: 94%   99%   Weight:       Height:                                                  BP Readings from Last 3 Encounters:   23 (!) 149/74   23 104/75   23 110/66       NPO Status: Time of last liquid consumption: 930                        Time of last solid consumption:                         Date of last liquid consumption: 23 (4oz apple juice, Dr. Victor Manuel Abdullahi aware)                        Date of last solid food consumption: 01/25/23    BMI:   Wt Readings from Last 3 Encounters:   01/24/23 210 lb (95.3 kg)   01/20/23 214 lb 11.2 oz (97.4 kg)   01/17/23 212 lb (96.2 kg)     Body mass index is 30.13 kg/m².    CBC:   Lab Results   Component Value Date/Time    WBC 5.3 01/26/2023 05:30 AM    RBC 2.82 01/26/2023 05:30 AM    RBC 4.75 04/19/2012 11:30 AM    HGB 7.6 01/26/2023 05:30 AM    HCT 23.8 01/26/2023 05:30 AM    MCV 84.4 01/26/2023 05:30 AM    RDW 19.0 01/26/2023 05:30 AM    PLT 96 01/26/2023 05:30 AM     04/19/2012 11:30 AM       CMP:   Lab Results   Component Value Date/Time     01/26/2023 05:30 AM    K 3.8 01/26/2023 05:30 AM     01/26/2023 05:30 AM    CO2 23 01/26/2023 05:30 AM    BUN 14 01/26/2023 05:30 AM    CREATININE 0.70 01/26/2023 05:30 AM    GFRAA >60 09/28/2022 01:33 PM    LABGLOM >60 01/26/2023 05:30 AM    GLUCOSE 92 01/26/2023 05:30 AM    GLUCOSE 65 04/19/2012 11:30 AM    PROT 5.0 01/24/2023 10:00 PM    CALCIUM 7.9 01/26/2023 05:30 AM    BILITOT 1.5 01/24/2023 10:00 PM    ALKPHOS 197 01/24/2023 10:00 PM    AST 69 01/24/2023 10:00 PM    ALT 25 01/24/2023 10:00 PM       POC Tests: No results for input(s): POCGLU, POCNA, POCK, POCCL, POCBUN, POCHEMO, POCHCT in the last 72 hours.    Coags:   Lab Results   Component Value Date/Time    PROTIME 17.0 01/24/2023 10:00 PM    INR 1.4 01/24/2023 10:00 PM    APTT 35.9 01/02/2023 10:55 AM       HCG (If Applicable): No results found for: PREGTESTUR, PREGSERUM, HCG, HCGQUANT     ABGs: No results found for: PHART, PO2ART, WPI9PTN, SXW9LZF, BEART, J6IREDDW     Type & Screen (If Applicable):  No results found for: LABABO, LABRH    Drug/Infectious Status (If Applicable):  Lab Results   Component Value Date/Time    HEPCAB REACTIVE 12/23/2020 05:09 PM       COVID-19 Screening (If Applicable):   Lab Results   Component Value Date/Time    COVID19 Not Detected 09/12/2022 10:32 PM    COVID19 Not Detected 07/17/2020 10:00 AM           Anesthesia Evaluation  Patient  summary reviewed and Nursing notes reviewed   history of anesthetic complications: PONV. Airway: Mallampati: II  TM distance: >3 FB   Neck ROM: full  Mouth opening: > = 3 FB   Dental:    (+) poor dentition  Comment: Multiple chipped/ missing  teeth    Pulmonary: breath sounds clear to auscultation  (+) shortness of breath:                             Cardiovascular:    (+) hypertension:,         Rhythm: regular  Rate: normal                    Neuro/Psych:   (+) neuromuscular disease:, psychiatric history:depression/anxiety              ROS comment: Spinal stenosis  Sciatica  GI/Hepatic/Renal:   (+) GERD:, PUD, hepatitis: C, liver disease:,           Endo/Other: Negative Endo/Other ROS   (+) blood dyscrasia: anemia and thrombocytopenia:., .                 Abdominal:             Vascular: Other Findings:           Anesthesia Plan      general     ASA 3     (TIVA)  Induction: intravenous. Anesthetic plan and risks discussed with patient. Plan discussed with CRNA.                     Dom Donohue MD   1/26/2023

## 2023-01-26 NOTE — PROGRESS NOTES
Pt had EGD done today, back on unit complained of a headache and nausea was given zofran and butalbital-APAP-caffeine pill. Vitals stable otherwise. GI would like pt to stay a few more days, will be on clears tonight. Will have Q6 H/H check.

## 2023-01-26 NOTE — CONSULTS
GI Consult Note:    Name: Emma Zimmer  MRN: [id number]     Acct: [de-identified]  Room: 2089/2089-01    Admit Date: 1/24/2023  PCP: VASU Vivas    Physician Requesting Consult: Rima Goyal MD     Reason for Consult: Severe anemia, drop of hemoglobin    Chief Complaint:     Chief Complaint   Patient presents with    Abnormal Lab     Pt reports a hemoglobin of 6.0       History Obtained From:     patient, electronic medical record, nursing staff    History of Present Illness:      Emma Zimmer is a  60 y.o.  male who presents with Abnormal Lab (Pt reports a hemoglobin of 6.0)      Symptoms:  Patient had outpatient labs done and was found to be severely anemic and hemoglobin was 5.7. His recent known hemoglobin was about 8 g. During my visit around 9:30 AM, patient denied symptoms including weakness, tiredness, dizziness etc.  He is known to have recurrent anemia secondary to GI blood loss in the past.  He denies recent melanotic stools, nausea vomiting, hematemesis etc.    This patient is known to have stage II carcinoma of the esophagus for which he had chemoradiation therapy. Patient was evaluated locally and also at [address] [place] [address] and felt that he is not a candidate for resection. Is known to have cirrhosis of the liver secondary to alcoholism. Has a decompensated disease. Is also known to have portal hypertension, portal hypertensive gastropathy. It was felt that his recurrent anemia was secondary to portal hypertensive gastropathy which was not controllable. Subsequently patient had TIPS procedure and he did well for a while. This patient had episode of GI bleed in December 2022 and at that time he was transferred to [facility] and had a EGD done and was found to have varices. Subsequently he had revision of TIPS procedure done as it was felt that TIPS was not functioning well. The stent was dilated up to 10 mm size.   Also at that time left gastric vein was embolized. During this procedure of TIPS revision, it was found that portosystemic shunt pressure was 6 mmHg. During my visit patient stated that he is comfortable. He received 1 unit of blood and hemoglobin improved. I did inspect his stools and they are not melanotic. However the color was dark brown. Patient also has a history of encephalopathy. At present he is getting paracentesis frequently every 1 to 2 weeks. Not clear patient was following salt restricted diet. Because of renal issues and also electrolyte imbalance, diuretic dose could not be increased. Apparently patient has been drinking alcohol and recently was drinking 7-8 beers on daily basis. No fever or chills. Previous records reviewed      Also reviewed GI APRN notes which is as follows:    78-year-old male well-known to our practice presents to ED for abnormal labs, low hgb. PMHX includes alcoholic cirrhosis s/p TIPS, hx EV with banding, PHG, abdominal ascites requiring weekly LVP, hx Lezama's, esophageal cancer with chemoradiation in the past, HTN. Patient was recently admitted to the hospital for melena and subsequently had EGD revealing large EV and GV, PHG. Following this patient had TIPS evaulation by Dr. Grullon Overall with stent PTA'd to 10 mm. The L gastric vein was embolized. Posterior gastric venography did not show filling of gastric varices. He then had repeat EGD with Dr. Jaclyn Méndez showing grade 1-2 varices in the distal esophagus without stigmata of bleeding, few AVMs in the cardia, thickened gastric folds but no evidence of varices, and mild diffuse portal hypertensive gastropathy. Hgb 5.7 on admission. Patient had large BM during evaluation. Does not have melena. No encephalopathy. Abdomen softly distended  BS active  Has chronic SOB  Denies any chest pain, abdominal pain, nausea, vomiting. No melena, hematochezia. Reports following salt restricted diet at home.   He reports taking his medications as directed. Does continue to drink beer, typically on the weekends. Review of Systems   Constitutional:  Negative for appetite change, fatigue and fever. HENT:  Negative for mouth sores, sore throat, trouble swallowing and voice change. Eyes:         No ictirus   Respiratory:  Negative for apnea, cough, choking, shortness of breath and wheezing. Cardiovascular:  Negative for chest pain, palpitations and leg swelling. Gastrointestinal:  Positive for abdominal distention. Negative for blood in stool, constipation, diarrhea, nausea and vomiting. Endocrine: Negative for polydipsia, polyphagia and polyuria. Genitourinary:  Negative for frequency, hematuria and urgency. Musculoskeletal:  Positive for arthralgias. Negative for back pain, gait problem and joint swelling. Skin:  Negative for pallor and rash. Allergic/Immunologic: Negative for food allergies. Neurological:  Positive for weakness. Negative for dizziness, seizures and headaches. Hematological:  Negative for adenopathy. Does not bruise/bleed easily. Psychiatric/Behavioral:  Negative for sleep disturbance. The patient is not nervous/anxious.         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 Rafael Victor, orthopedic, every 3-4 months, gets steroid injection    Lezama esophagus     Bleeding gastric varices 12/29/2022    BPH (benign prostatic hypertrophy)     Cholelithiasis     Cirrhosis ([address]     COVID-19 12/2020    pt reports he had a positive test while at [facility] in 2020, was asymptomatic    COVID-19 vaccine series completed 5/20/2021, 6/22/2021    Moderna 5/20/2021, 6/22/2021    DDD (degenerative disc disease), lumbar     Depression     Esophageal cancer ([address]     INVASIVE ADENOCARCINOMA ARISING IN TUBULAR ADENOMA WITH HIGH GRADE DYSPLASIA, ASSOCIATED WITH FOCAL INTESTINAL METAPLASIA     Esophageal varices (HCC)     Fatty liver     GERD (gastroesophageal reflux disease)     GI bleed     Hep C w/o coma, chronic ([address]     History of alcohol abuse     6-12 beers a day; quit drinking 2019    History of blood transfusion     History of colon polyps 2016    History of tobacco abuse     [place] (hard of hearing)     Hyperlipidemia     Hypertension     Hyponatremia 07/20/2016    Hypotension 12/20/2021    Mastoid disorder, bilateral 12/31/2022    biLateral mastoid effusion    PONV (postoperative nausea and vomiting)     Port-A-Cath in place     right upper chest    Portal hypertension (HCC)     Sciatica     Secondary esophageal varices ([address] 06/07/2022    Shortness of breath     Spinal stenosis     Stomach ulcer     hx of    Thrombocytopenia ([address] 12/23/2020    Tubular adenoma of colon 2016, 2018    Vitamin D deficiency     Wears glasses         Past Surgical History:     Past Surgical History:   Procedure Laterality Date    BUNIONECTOMY      twice on right side    BUNIONECTOMY Left     CARPAL TUNNEL RELEASE Right     COLONOSCOPY      at age 35    COLONOSCOPY  10/05/2016    polyps-pathology tubular adenoma, and abnormal looking mucosa right colon-pathology-tubular adenoma    COLONOSCOPY N/A 03/30/2018    COLONOSCOPY POLYPECTOMY COLD BIOPSY performed by Nicki Mckay MD at [address] [facility]  03/30/2018    Small polyp in the sigmoid colon and excised with biopsy forceps--tubular adenoma    COLONOSCOPY N/A 04/16/2022    COLONOSCOPY POLYPECTOMY performed by Nicki Mckay MD at [address] [place] [address], [place], DIAGNOSTIC      EGD    ESOPHAGOGASTRODUODENOSCOPY  12/29/2022    ESOPHAGOGASTRODUODENOSCOPY N/A 01/03/2023    IR PORT PLACEMENT EQUAL OR GREATER THAN 5 YEARS  04/19/2021    IR PORT PLACEMENT EQUAL OR GREATER THAN 5 YEARS 4/19/2021 [facility]    IR TIPS INSERTION  12/01/2022    IR TIPS INSERTION 12/1/2022 Laura Books, MD [facility]    KNEE SURGERY Left     cyst removed    NASAL SEPTUM SURGERY      NERVE BLOCK Right 11/23/2020    NERVE BLOCK RIGHT CERVICAL STEROID INJECTION  C3-C6 performed by Sandra Carpenter MD at [address]  01/04/2016    steroid injection C7 T1    OTHER SURGICAL HISTORY  11/21/2016    Bilateral Lumbar CACHORRO L4-L5 injections    OTHER SURGICAL HISTORY  12/19/2016    lumbar steroid injection    OTHER SURGICAL HISTORY  09/28/2018    BILATERAL L5 CACHORRO (N/A Back)    OTHER SURGICAL HISTORY Right 11/23/2020    cervical injection    PAIN MANAGEMENT PROCEDURE Left 07/09/2020    EPIDURAL STEROID INJECTION LEFT L4 L5 performed by Sandra Carpenter MD at [address] [place] [address] Left 07/20/2020    LEFT L4 L5 EPIDURAL STEROID INJECTION performed by Sandra Carpenter MD at [address] [place] [address] Bilateral 08/17/2020    LUMBAR FACET BILATERAL L2-L5 performed by Sandra Carpenter MD at [address] [place] [address] Bilateral 12/07/2020    NERVE BLOCK BILATERAL LUMBAR MEDIAL BRANCH L2-L5 performed by Sandra Carpenter MD at [address] [facility] NEUROPLASTY &/TRANSPOS MEDIAN NRV CARPAL TUNNE Right 08/29/2017    CARPAL TUNNEL RELEASE RIGHT performed by Tiffany Glover MD at [address] &/TRANSPOS MEDIAN NRV CARPAL TUNNE Left 10/31/2017    CARPAL TUNNEL RELEASE performed by Tiffany Glover MD at [facility] [address] AA&/STRD TFRML EPI LUMBAR/SACRAL 1 LEVEL Bilateral 09/06/2018    BILATERAL L5 CACHORRO performed by Sandra Carpenter MD at [facility] [address] AA&/STRD TFRML EPI LUMBAR/SACRAL 1 LEVEL N/A 09/28/2018    BILATERAL L5 CACHORRO performed by Sandra Carpenter MD at [address] [place] [address] 12/29/2020    EGD BIOPSY performed by Adrianne Carlsno MD at [address] [place] [address] 02/02/2021    EGD BIOPSY and spot marking performed by Beltran Causey MD at [address] [place] [address] 02/12/2021    ENDOSCOPIC ULTRASOUND, EGD performed by Wen العلي MD at [id number]  02/12/2021    EGD DIAGNOSTIC ONLY performed by Wen العلي MD at [id number] 08/31/2021    EGD BIOPSY performed by Meggan Camarillo MD at [address] [place] [address] 01/21/2022    EGD BIOPSY performed by Meggan Camarillo MD at [address] [place] [address] N/A 04/15/2022    EGD ESOPHAGOGASTRODUODENOSCOPY performed by Andrea Ritter MD at [address] [place] [address] 06/06/2022    EGD BAND LIGATION performed by Wilmar Gerber MD at [address] [place] [address] N/A 06/09/2022    EGD ESOPHAGOGASTRODUODENOSCOPY performed by Wilmar Gerber MD at [address] [place] [address] N/A 09/14/2022    EGD ESOPHAGOGASTRODUODENOSCOPY ENDOSCOPIC APC AT GE JUNCTION performed by Marleen Phan MD at [address] [place] [address] 10/19/2022    EGD BIOPSY performed by Meggan Camarillo MD at [address] [place] [address] 10/31/2022    EGD with APC performed by Meggan Camarillo MD at [address] [place] [address]  12/29/2022    EGD ESOPHAGOGASTRODUODENOSCOPY performed by Wen العلي MD at [address] [place] [address] 1/3/2023    EGD ESOPHAGOGASTRODUODENOSCOPY performed by Michelle Maria MD at [address] [place] [address]          Medications Prior to Admission:       Prior to Admission medications    Medication Sig Start Date End Date Taking?  Authorizing Provider   lactulose (CHRONULAC) 10 GM/15ML solution take 30 milliliters by mouth three times a day 1/17/23   VASU Vivas   furosemide (LASIX) 40 MG tablet Take 1 tablet by mouth in the morning and 1 tablet in the evening. 1/11/23   Elizabeth Andino DO   spironolactone (ALDACTONE) 25 MG tablet Take 1 tablet by mouth daily 1/11/23   Nubia Bailey DO   pantoprazole (PROTONIX) 40 MG tablet Take 40 mg by mouth daily 10/4/22   Historical Provider, MD   FERWILD 325 (65 Fe) MG tablet take 1 tablet by mouth twice a day 10/7/22   VASU Menon   atorvastatin (LIPITOR) 20 MG tablet Take 1 tablet by mouth nightly 4/21/22   Lor Garcia MD        Allergies:       Patient has no known allergies. Social History:     Tobacco:    reports that he quit smoking about 6 years ago. His smoking use included cigarettes. He has a 45.00 pack-year smoking history. He has never used smokeless tobacco.  Alcohol:      reports that he does not currently use alcohol. Drug Use: reports that he does not currently use drugs after having used the following drugs: Cocaine. Frequency: 1.00 time per week. Family History:     Family History   Problem Relation Age of Onset    Cancer Mother         pancreatic    Cancer Father         bone    Diabetes Sister     Asthma Brother     Allergies Brother         MULTIPLE       Review of Systems:     Review of Systems   Constitutional:  Positive for activity change. Negative for appetite change, fatigue and fever. HENT:  Negative for mouth sores, sore throat, trouble swallowing and voice change. Eyes:         No ictirus   Respiratory:  Negative for apnea, cough, choking, shortness of breath and wheezing. Cardiovascular:  Negative for chest pain, palpitations and leg swelling. Gastrointestinal:  Positive for abdominal distention. Negative for blood in stool, constipation, nausea and vomiting. Endocrine: Negative for polydipsia, polyphagia and polyuria. Genitourinary:  Negative for frequency, hematuria and urgency. Musculoskeletal:  Positive for myalgias. Negative for arthralgias, back pain, gait problem and joint swelling. Skin:  Negative for pallor and rash. Allergic/Immunologic: Negative for food allergies. Neurological:  Positive for weakness.  Negative for dizziness, seizures and headaches. Hematological:  Negative for adenopathy. Does not bruise/bleed easily. Psychiatric/Behavioral:  Negative for sleep disturbance. The patient is nervous/anxious. Code Status:  Full Code    Physical Exam:     Vitals:  /84   Pulse (!) 106   Temp 98.2 °F (36.8 °C) (Oral)   Resp 16   Ht 5' 10\" (1.778 m)   Wt 210 lb (95.3 kg)   SpO2 96%   BMI 30.13 kg/m²   Temp (24hrs), Av.2 °F (36.8 °C), Min:97.8 °F (36.6 °C), Max:98.6 °F (37 °C)      Physical Exam  Vitals and nursing note reviewed. Constitutional:       General: He is not in acute distress. Appearance: He is well-developed. Comments: Patient appears anxious. HENT:      Head: Normocephalic and atraumatic. Mouth/Throat:      Pharynx: No oropharyngeal exudate. Eyes:      General: No scleral icterus. Conjunctiva/sclera: Conjunctivae normal.      Pupils: Pupils are equal, round, and reactive to light. Neck:      Thyroid: No thyromegaly. Trachea: No tracheal deviation. Cardiovascular:      Rate and Rhythm: Normal rate and regular rhythm. Heart sounds: Normal heart sounds. No murmur heard. Pulmonary:      Effort: Pulmonary effort is normal. No respiratory distress. Breath sounds: Normal breath sounds. No wheezing or rales. Abdominal:      General: Abdomen is protuberant. Bowel sounds are normal. There is distension. Palpations: Abdomen is soft. There is shifting dullness. There is no hepatomegaly or mass. Tenderness: There is no abdominal tenderness. There is no guarding. Hernia: No hernia is present. Comments: Has a peripheral signs of chronic liver disease. Genitourinary:     Rectum: Normal.   Musculoskeletal:      Cervical back: Normal range of motion and neck supple. Lymphadenopathy:      Cervical: No cervical adenopathy. Skin:     General: Skin is warm and dry. Findings: No erythema or rash. Comments: Skin bruising.    Neurological:      Mental Status: He is alert and oriented to person, place, and time. Cranial Nerves: No cranial nerve deficit. Psychiatric:         Thought Content:  Thought content normal.     Data:   CBC:   Lab Results   Component Value Date/Time    WBC 3.9 01/25/2023 05:21 AM    RBC 2.62 01/25/2023 05:21 AM    RBC 4.75 04/19/2012 11:30 AM    HGB 8.3 01/25/2023 02:01 PM    HCT 25.9 01/25/2023 02:01 PM    MCV 85.4 01/25/2023 05:21 AM    MCH 26.1 01/25/2023 05:21 AM    MCHC 30.6 01/25/2023 05:21 AM    RDW 20.3 01/25/2023 05:21 AM     01/25/2023 05:21 AM     04/19/2012 11:30 AM    MPV 7.2 01/25/2023 05:21 AM     CBC with Differential:  Lab Results   Component Value Date/Time    WBC 3.9 01/25/2023 05:21 AM    RBC 2.62 01/25/2023 05:21 AM    RBC 4.75 04/19/2012 11:30 AM    HGB 8.3 01/25/2023 02:01 PM    HCT 25.9 01/25/2023 02:01 PM     01/25/2023 05:21 AM     04/19/2012 11:30 AM    MCV 85.4 01/25/2023 05:21 AM    MCH 26.1 01/25/2023 05:21 AM    MCHC 30.6 01/25/2023 05:21 AM    RDW 20.3 01/25/2023 05:21 AM    NRBC 1 06/03/2022 06:20 AM    METASPCT 2 10/30/2022 05:55 AM    LYMPHOPCT 18 01/24/2023 10:00 PM    MONOPCT 3 01/24/2023 10:00 PM    MYELOPCT 1 06/01/2021 08:25 AM    BASOPCT 0 01/24/2023 10:00 PM    MONOSABS 0.14 01/24/2023 10:00 PM    LYMPHSABS 0.86 01/24/2023 10:00 PM    EOSABS 0.05 01/24/2023 10:00 PM    BASOSABS 0.00 01/24/2023 10:00 PM    DIFFTYPE NOT REPORTED 02/02/2022 09:35 AM     Hemoglobin/Hematocrit:  Lab Results   Component Value Date/Time    HGB 8.3 01/25/2023 02:01 PM    HCT 25.9 01/25/2023 02:01 PM     CMP:    Lab Results   Component Value Date/Time     01/25/2023 05:21 AM    K 4.1 01/25/2023 05:21 AM     01/25/2023 05:21 AM    CO2 20 01/25/2023 05:21 AM    BUN 12 01/25/2023 05:21 AM    CREATININE 0.48 01/25/2023 05:21 AM    GFRAA >60 09/28/2022 01:33 PM    LABGLOM >60 01/25/2023 05:21 AM    GLUCOSE 94 01/25/2023 05:21 AM    GLUCOSE 65 04/19/2012 11:30 AM    PROT 5.0 01/24/2023 10:00 PM LABALBU 2.4 01/24/2023 10:00 PM    LABALBU 4.7 04/19/2012 11:30 AM    CALCIUM 7.4 01/25/2023 05:21 AM    BILITOT 1.5 01/24/2023 10:00 PM    ALKPHOS 197 01/24/2023 10:00 PM    AST 69 01/24/2023 10:00 PM    ALT 25 01/24/2023 10:00 PM     BMP:  Lab Results   Component Value Date/Time     01/25/2023 05:21 AM    K 4.1 01/25/2023 05:21 AM     01/25/2023 05:21 AM    CO2 20 01/25/2023 05:21 AM    BUN 12 01/25/2023 05:21 AM    LABALBU 2.4 01/24/2023 10:00 PM    LABALBU 4.7 04/19/2012 11:30 AM    CREATININE 0.48 01/25/2023 05:21 AM    CALCIUM 7.4 01/25/2023 05:21 AM    GFRAA >60 09/28/2022 01:33 PM    LABGLOM >60 01/25/2023 05:21 AM    GLUCOSE 94 01/25/2023 05:21 AM    GLUCOSE 65 04/19/2012 11:30 AM     PT/INR:    Lab Results   Component Value Date/Time    PROTIME 17.0 01/24/2023 10:00 PM    INR 1.4 01/24/2023 10:00 PM     PTT:    Lab Results   Component Value Date/Time    APTT 35.9 01/02/2023 10:55 AM   [APTT}    Assesment:     Primary Problem  Symptomatic anemia    Active Hospital Problems    Diagnosis Date Noted    Ascites due to alcoholic cirrhosis ([facility] [B23.38] 04/26/2022     Priority: Medium    Symptomatic anemia, microcytic, acute [D64.9] 12/20/2021       Plan:     Patient has chronic liver disease, cirrhosis of the liver secondary to alcoholism. Has a significant portal hypertensive gastropathy, history of esophageal varices. Status post TIPS procedure with revision of TIPS procedure, history of gastric vein embolization  History of esophageal cancer treated with chemoradiation therapy  Patient is noncompliant continue to drink alcohol  Decompensated liver disease  Significant anemia need to watch 24 hours whether patient has signs of bleeding  Ultrasound of the liver to evaluate patency of the TIPS  Discussed with the patient and also Dr. Katarzyna Carrillo regarding management plan  2 g sodium diet      Thank you for allowing me to participate in the care of your patient.   Please feel free to contact me with anyquestions or concerns. Electronically signed by Jian Uribe MD on 1/25/2023 at 9:08 PM     Copy sent to VASU Johns    Note is dictated utilizing voice recognition software. Unfortunately this leads to occasional typographical errors. Please contact our office if you have any questions.

## 2023-01-26 NOTE — PLAN OF CARE
Problem: Safety - Adult  Goal: Free from fall injury  1/26/2023 0331 by Paige Landin RN  Outcome: Progressing  Note: No falls noted this shift. Patient ambulates independently without difficulty. Bed kept in low position. Safe environment maintained. Bedside table & call light in reach. Uses call light appropriately when needing assistance. Problem: Chronic Conditions and Co-morbidities  Goal: Patient's chronic conditions and co-morbidity symptoms are monitored and maintained or improved  1/26/2023 0331 by Paige Landin RN  Outcome: Progressing     Problem: Pain  Goal: Verbalizes/displays adequate comfort level or baseline comfort level  1/26/2023 0331 by Paige Landin RN  Outcome: Progressing  Note: Patient given PRN pain medication for pain control.      Problem: Discharge Planning  Goal: Discharge to home or other facility with appropriate resources  1/26/2023 0331 by Paige Landin RN  Outcome: Progressing

## 2023-01-26 NOTE — PROGRESS NOTES
RN spoke with Dr. Taisha Landin. Reviewed patient's concerns that stool appears darker than earlier in the day. Reviewed current VS and next hemoglobin check. Per Dr. Taisha Landin, if patient has any more bowel movements or signs of bleeding, order a hemoglobin check at that time and transfuse if less than 7. If not, ok'ed to keep next scheduled CBC in the morning.

## 2023-01-26 NOTE — CARE COORDINATION
ONGOING DISCHARGE PLAN:    Patient is alert and oriented x4. Spoke with patient regarding discharge plan and patient confirms that plan is still to Return to home w/ no needs. Agreeable to [facility] when DC. HGB today 7.6, . Awaiting Plans from GI. Will continue to follow for additional discharge needs.     Electronically signed by Graciela Low RN on 1/26/2023 at 11:20 AM

## 2023-01-26 NOTE — PROGRESS NOTES
History and Physical Service  [facility] Internal Medicine    Progress note            Date:   1/26/2023  Patient name:  Manjit Zamora  MRN:   [id number]  Account:  [de-identified]  YOB: 1960  PCP:    VASU Vargas  Code Status:    Full Code    Chief Complaint:     Chief Complaint   Patient presents with    Abnormal Lab     Pt reports a hemoglobin of 6.0         History Obtained From:     Patient, EMR, nursing staff    HPI     This patient is a 60 y.o. Non- / non  malewho presents with    According to patient, he had outpatient lab work completed today and received a call from his PCP instructing him to come to the ED for hemoglobin 6.4. Repeat hemoglobin obtained in ED 5.7. Patient reports that he had a TIPS procedure completed in November for treatment of cirrhosis and states that he was hospitalized for a week in December due to bleeding from his TIPS. Symptoms are associated with shortness of breath with activity, which has been present since having pneumonia. Denies fever, chills, chest pain, cough, abdominal pain, nausea, vomiting, diarrhea, and urinary symptoms. Denies hematochezia, melena, hematuria, and all other obvious signs of bleeding. Declined REBECCA in ED. States that stools are brown as usual.  There are no aggravating or alleviating factors. Symptoms are reported as Constant and moderate    Review of Systems:   Positive for generalized fatigue  Denies any shortness of breath or cough  Denies chest pain or palpitations  Denies abdominal pain, diarrhea vomiting  Denies any new numbness tremors or weakness. A 10 point review of systems was performed and and negative except as mentioned in HPI  Positive and Negative as described in HPI.       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 Robb Cutler, orthopedic, every 3-4 months, gets steroid injection    Lezama esophagus     Bleeding gastric varices 12/29/2022    BPH (benign prostatic hypertrophy)     Cholelithiasis     Cirrhosis ([address]     COVID-19 12/2020    pt reports he had a positive test while at [facility] in 2020, was asymptomatic    COVID-19 vaccine series completed 5/20/2021, 6/22/2021    Moderna 5/20/2021, 6/22/2021    DDD (degenerative disc disease), lumbar     Depression     Esophageal cancer ([address]     INVASIVE ADENOCARCINOMA ARISING IN TUBULAR ADENOMA WITH HIGH GRADE DYSPLASIA, ASSOCIATED WITH FOCAL INTESTINAL METAPLASIA     Esophageal varices ([address]     Fatty liver     GERD (gastroesophageal reflux disease)     GI bleed     Hep C w/o coma, chronic ([address]     History of alcohol abuse     6-12 beers a day; quit drinking 2019    History of blood transfusion     History of colon polyps 2016    History of tobacco abuse     [place] (hard of hearing)     Hyperlipidemia     Hypertension     Hyponatremia 07/20/2016    Hypotension 12/20/2021    Mastoid disorder, bilateral 12/31/2022    biLateral mastoid effusion    PONV (postoperative nausea and vomiting)     Port-A-Cath in place     right upper chest    Portal hypertension (HCC)     Sciatica     Secondary esophageal varices ([address] 06/07/2022    Shortness of breath     Spinal stenosis     Stomach ulcer     hx of    Thrombocytopenia ([address] 12/23/2020    Tubular adenoma of colon 2016, 2018    Vitamin D deficiency     Wears glasses         Past Surgical History:     Past Surgical History:   Procedure Laterality Date    BUNIONECTOMY      twice on right side    BUNIONECTOMY Left     CARPAL TUNNEL RELEASE Right     COLONOSCOPY      at age 35    COLONOSCOPY  10/05/2016    polyps-pathology tubular adenoma, and abnormal looking mucosa right colon-pathology-tubular adenoma    COLONOSCOPY N/A 03/30/2018    COLONOSCOPY POLYPECTOMY COLD BIOPSY performed by Jannet Savage MD at [address] COLONOSCOPY  03/30/2018    Small polyp in the sigmoid colon and excised with biopsy forceps--tubular adenoma    COLONOSCOPY N/A 04/16/2022    COLONOSCOPY POLYPECTOMY performed by Jovan Quinn MD at [address], DIAGNOSTIC      EGD    ESOPHAGOGASTRODUODENOSCOPY  12/29/2022    ESOPHAGOGASTRODUODENOSCOPY N/A 01/03/2023    IR PORT PLACEMENT EQUAL OR GREATER THAN 5 YEARS  04/19/2021    IR PORT PLACEMENT EQUAL OR GREATER THAN 5 YEARS 4/19/2021 [facility]    IR TIPS INSERTION  12/01/2022    IR TIPS INSERTION 12/1/2022 Antoinette Maier MD [facility]    KNEE SURGERY Left     cyst removed    NASAL SEPTUM SURGERY      NERVE BLOCK Right 11/23/2020    NERVE BLOCK RIGHT CERVICAL STEROID INJECTION  C3-C6 performed by Cabrera Mariee MD at . [facility]  01/04/2016    steroid injection C7 T1    OTHER SURGICAL HISTORY  11/21/2016    Bilateral Lumbar CACHORRO L4-L5 injections    OTHER SURGICAL HISTORY  12/19/2016    lumbar steroid injection    OTHER SURGICAL HISTORY  09/28/2018    BILATERAL L5 CACHORRO (N/A Back)    OTHER SURGICAL HISTORY Right 11/23/2020    cervical injection    PAIN MANAGEMENT PROCEDURE Left 07/09/2020    EPIDURAL STEROID INJECTION LEFT L4 L5 performed by Cabrera Mariee MD at [address] Left 07/20/2020    LEFT L4 L5 EPIDURAL STEROID INJECTION performed by Cabrera Mariee MD at [address] Bilateral 08/17/2020    LUMBAR FACET BILATERAL L2-L5 performed by Cabrera Mariee MD at [address] [place] [address] Bilateral 12/07/2020    NERVE BLOCK BILATERAL LUMBAR MEDIAL BRANCH L2-L5 performed by Cabrera Mariee MD at [address]      [place]    [facility] NEUROPLASTY &/TRANSPOS MEDIAN NRV CARPAL [place] Organ Right 08/29/2017    CARPAL TUNNEL RELEASE RIGHT performed by Alvarado Vance MD at [address] &/TRANSPOS MEDIAN NRV CARPAL TUNNE Left 10/31/2017    CARPAL TUNNEL RELEASE performed by Yancy Arnold MD at [place] [id number] AA&/STRD TFRML EPI LUMBAR/SACRAL 1 LEVEL Bilateral 09/06/2018    BILATERAL L5 CACHORRO performed by Michelle Lyons MD at [facility] [address] AA&/STRD TFRML EPI LUMBAR/SACRAL 1 LEVEL N/A 09/28/2018    BILATERAL L5 CACHORRO performed by Michelle Lyons MD at [facility] 12/29/2020    EGD BIOPSY performed by Melony Arnold MD at [address] [place] [address] 02/02/2021    EGD BIOPSY and spot marking performed by Irish Perez MD at [address] [place] [address] 02/12/2021    ENDOSCOPIC ULTRASOUND, EGD performed by Yusra Fulton MD at [place] [address]  02/12/2021    EGD DIAGNOSTIC ONLY performed by Yusra Fulton MD at [place] [address] N/A 08/31/2021    EGD BIOPSY performed by Irish Perez MD at [address] [place] [address] 01/21/2022    EGD BIOPSY performed by Irish Perez MD at [address] [place] [address] N/A 04/15/2022    EGD ESOPHAGOGASTRODUODENOSCOPY performed by Melony Arnold MD at [address] [place] [address] 06/06/2022    EGD BAND LIGATION performed by Lyndsay Leigh MD at [address] [place] [address] N/A 06/09/2022    EGD ESOPHAGOGASTRODUODENOSCOPY performed by Lyndsay Leigh MD at [address] [place] [address] N/A 09/14/2022    EGD ESOPHAGOGASTRODUODENOSCOPY ENDOSCOPIC APC AT GE JUNCTION performed by Cintia Orr MD at [address] [place] [address] 10/19/2022    EGD BIOPSY performed by Irish Perez MD at [address] [place] [address] 10/31/2022    EGD with APC performed by Irish Perez MD at [address] [place] [address]  12/29/2022    EGD ESOPHAGOGASTRODUODENOSCOPY performed by Yusra Fulton MD at [address] [place] [address] 1/3/2023    EGD ESOPHAGOGASTRODUODENOSCOPY performed by Hayder Tavares MD at [address] [place] [address]          Medications Prior to Admission:     Prior to Admission medications    Medication Sig Start Date End Date Taking? Authorizing Provider   lactulose (CHRONULAC) 10 GM/15ML solution take 30 milliliters by mouth three times a day 1/17/23   VASU Maldonado   furosemide (LASIX) 40 MG tablet Take 1 tablet by mouth in the morning and 1 tablet in the evening. 1/11/23   Shadi Krishna,    spironolactone (ALDACTONE) 25 MG tablet Take 1 tablet by mouth daily 1/11/23   Shadi Krishna, DO   pantoprazole (PROTONIX) 40 MG tablet Take 40 mg by mouth daily 10/4/22   Historical Provider, MD   FEROSUL 325 (65 Fe) MG tablet take 1 tablet by mouth twice a day 10/7/22   VASU Maldonado   atorvastatin (LIPITOR) 20 MG tablet Take 1 tablet by mouth nightly 4/21/22   Kinza Sky MD        Allergies:     Patient has no known allergies. Social History:     Tobacco:    reports that he quit smoking about 6 years ago. His smoking use included cigarettes. He has a 45.00 pack-year smoking history. He has never used smokeless tobacco.  Alcohol:      reports that he does not currently use alcohol. Drug Use:  reports that he does not currently use drugs after having used the following drugs: Cocaine. Frequency: 1.00 time per week. Family History:     Family History   Problem Relation Age of Onset    Cancer Mother         pancreatic    Cancer Father         bone    Diabetes Sister     Asthma Brother     Allergies Brother         MULTIPLE           Physical Exam:   /67   Pulse 100   Temp 98.4 °F (36.9 °C) (Oral)   Resp 18   Ht 5' 10\" (1.778 m)   Wt 210 lb (95.3 kg)   SpO2 94%   BMI 30.13 kg/m²   No results for input(s): POCGLU in the last 72 hours.     General Appearance:  alert, well appearing, and in no acute distress  Mental status: oriented to person, place, and time with normal affect  Head: normocephalic, atraumatic. Eye: no icterus, redness, pupils equal and reactive, extraocular eye movements intact, conjunctiva clear  Ear: normal external ear, no discharge, hearing intact  Nose:  no drainage noted  Mouth: mucous membranes moist  Neck: supple, no carotid bruits, thyroid not palpable  Lungs: Bilateral equal air entry, clear to ausculation, no wheezing, rales or rhonchi, normal effort  Cardiovascular: normal rate, regular rhythm, no murmur, gallop, rub.   Abdomen: Soft, nontender, ascitic distention present   Neurologic: There are no new focal motor or sensory deficits, normal muscle tone and bulk, no abnormal sensation, normal speech, cranial nerves II through XII grossly intact  Skin: No gross lesions, rashes, bruising or bleeding on exposed skin area  Extremities:  peripheral pulses palpable, grade 1 bilateral pedal edema no calf pain with palpation  Psych: normal affect     Investigations:      Laboratory Testing:  Recent Results (from the past 24 hour(s))   Hemoglobin and Hematocrit    Collection Time: 01/25/23  2:01 PM   Result Value Ref Range    Hemoglobin 8.3 (L) 13.5 - 17.5 g/dL    Hematocrit 25.9 (L) 41 - 53 %   Basic Metabolic Panel w/ Reflex to MG    Collection Time: 01/26/23  5:30 AM   Result Value Ref Range    Glucose 92 70 - 99 mg/dL    BUN 14 8 - 23 mg/dL    Creatinine 0.70 0.70 - 1.20 mg/dL    Est, Glom Filt Rate >60 >60 mL/min/1.73m2    Calcium 7.9 (L) 8.6 - 10.4 mg/dL    Sodium 132 (L) 135 - 144 mmol/L    Potassium 3.8 3.7 - 5.3 mmol/L    Chloride 101 98 - 107 mmol/L    CO2 23 20 - 31 mmol/L    Anion Gap 8 (L) 9 - 17 mmol/L   CBC    Collection Time: 01/26/23  5:30 AM   Result Value Ref Range    WBC 5.3 3.5 - 11.0 k/uL    RBC 2.82 (L) 4.5 - 5.9 m/uL    Hemoglobin 7.6 (L) 13.5 - 17.5 g/dL    Hematocrit 23.8 (L) 41 - 53 %    MCV 84.4 80 - 100 fL    MCH 27.1 26 - 34 pg    MCHC 32.1 31 - 37 g/dL    RDW 19.0 (H) 11.5 - 14.9 %    Platelets 96 (L) 844 - 450 k/uL    MPV 7.6 6.0 - 12.0 fL Recent Labs     01/26/23 0530 01/25/23  0521 01/24/23 2200 01/02/23  1648 01/02/23  1055   HGB 7.6*   < > 5.7*   < >  --    HCT 23.8*   < > 17.8*   < >  --    WBC 5.3   < > 4.8   < >  --    MCV 84.4   < > 81.4   < >  --    *   < > 131*   < >  --    K 3.8   < > 4.2   < >  --       < > 102   < >  --    CO2 23   < > 23   < >  --    BUN 14   < > 13   < >  --    CREATININE 0.70   < > 0.53*   < >  --    GLUCOSE 92   < > 112*   < >  --    INR  --   --  1.4   < > 1.4   PROTIME  --   --  17.0*   < > 14.7*   APTT  --   --   --   --  35.9*   AST  --   --  69*   < >  --    ALT  --   --  25   < >  --    LABALBU  --   --  2.4*   < >  --     < > = values in this interval not displayed.        Hematology:  Recent Labs     01/24/23 2200 01/25/23 0521 01/25/23  1401 01/26/23  0530   WBC 4.8 3.9  --  5.3   RBC 2.19* 2.62*  --  2.82*   HGB 5.7* 6.9* 8.3* 7.6*   HCT 17.8* 22.4* 25.9* 23.8*   MCV 81.4 85.4  --  84.4   MCH 26.0 26.1  --  27.1   MCHC 32.0 30.6*  --  32.1   RDW 21.7* 20.3*  --  19.0*   * 106*  --  96*   MPV 7.2 7.2  --  7.6   INR 1.4  --   --   --      Chemistry:  Recent Labs     01/24/23 2200 01/25/23 0521 01/26/23  0530   * 132* 132*   K 4.2 4.1 3.8    103 101   CO2 23 20 23   GLUCOSE 112* 94 92   BUN 13 12 14   CREATININE 0.53* 0.48* 0.70   MG 1.7  --   --    ANIONGAP 6* 9 8*   LABGLOM >60 >60 >60   CALCIUM 7.7* 7.4* 7.9*     Recent Labs     01/24/23 2200 01/24/23  2240   PROT 5.0*  --    LABALBU 2.4*  --    AST 69*  --    ALT 25  --    ALKPHOS 197*  --    BILITOT 1.5*  --    BILIDIR 0.9*  --    AMMONIA  --  34   LIPASE 34  --        Imaging/Diagnostics:       XR CHEST (SINGLE VIEW FRONTAL)    Result Date: 12/27/2022  EXAMINATION: ONE XRAY VIEW OF THE CHEST 12/27/2022 5:26 pm COMPARISON: Chest radiographs on 12/09/2022 HISTORY: ORDERING SYSTEM PROVIDED HISTORY: eval for effusion TECHNOLOGIST PROVIDED HISTORY: eval for effusion Reason for Exam: eval for effusion FINDINGS: A right IJ port catheter tip is in the superior vena cava. There is mild bibasilar atelectasis with trace bilateral pleural fluid. No pneumothorax is identified. There is calcification at the aortic arch. 1. Minimal bibasilar atelectatic changes with trace left pleural fluid which is slightly decreased from 12/09/2022. XR RADIUS ULNA RIGHT (2 VIEWS)    Result Date: 1/1/2023  EXAMINATION: TWO XRAY VIEWS OF THE RIGHT FOREARM 1/1/2023 5:50 pm COMPARISON: None. HISTORY: ORDERING SYSTEM PROVIDED HISTORY: swelling R forearm TECHNOLOGIST PROVIDED HISTORY: swelling R forearm FINDINGS: No fracture, dislocation, or focal osseous lesion is noted. Mild volar soft tissue swelling. No fracture or dislocation. Mild soft tissue swelling     CT HEAD WO CONTRAST    Result Date: 12/31/2022  EXAMINATION: CT OF THE HEAD WITHOUT CONTRAST; CT OF THE CERVICAL SPINE WITHOUT CONTRAST 12/31/2022 1:58 am TECHNIQUE: CT of the head was performed without the administration of intravenous contrast. Automated exposure control, iterative reconstruction, and/or weight based adjustment of the mA/kV was utilized to reduce the radiation dose to as low as reasonably achievable.; CT of the cervical spine was performed without the administration of intravenous contrast. Multiplanar reformatted images are provided for review. Automated exposure control, iterative reconstruction, and/or weight based adjustment of the mA/kV was utilized to reduce the radiation dose to as low as reasonably achievable. COMPARISON: 12/07/2021 MRI cervical spine. 09/30/2020 MRI brain. HISTORY: ORDERING SYSTEM PROVIDED HISTORY: reduced mentation post fall R/O intracranial bleed TECHNOLOGIST PROVIDED HISTORY: reduced mentation post fall R/O intracranial bleed; ORDERING SYSTEM PROVIDED HISTORY: unwitnessed ground level fall. TECHNOLOGIST PROVIDED HISTORY: unwitnessed ground level fall.  FINDINGS: CT BRAIN: BRAIN/VENTRICLES: There is no acute intracranial hemorrhage, mass effect or midline shift. No abnormal extra-axial fluid collection. Involutional parenchymal changes. Age-indeterminate (likely chronic) left basal ganglia lacunar infarct. No large territorial hypodensity. There is no evidence of hydrocephalus. Stable 0.6 cm peripherally calcified cystic pineal lesion, likely an incidental pineal cyst. ORBITS: The visualized portion of the orbits demonstrate no acute abnormality. SINUSES: The visualized paranasal sinuses and mastoid air cells demonstrate no acute abnormality. SOFT TISSUES/SKULL:  No acute abnormality of the visualized skull or soft tissues. CT CERVICAL SPINE: BONES/ALIGNMENT: There is no acute fracture or traumatic malalignment. DEGENERATIVE CHANGES: Multilevel cervical spondylosis, most notable at C5-C6 and C6-C7. No high-grade bony spinal canal stenosis. SOFT TISSUES: There is no prevertebral soft tissue swelling. Partially visualized right IJ approach chest port. CT BRAIN: 1. No evidence of acute hemorrhage, large territorial hypodensity, significant mass effect/midline shift, or ventriculomegaly 2. Involutional parenchymal changes. 3. Age-indeterminate (likely chronic) left basal ganglia lacunar infarct. If clinically indicated, can consider further evaluation with MRI if no contraindications. CT CERVICAL SPINE: 1. No acute abnormality of the cervical spine. 2. Multilevel cervical spondylosis, most notable at C5-C6 and C6-C7. No high-grade bony spinal canal stenosis.      CT CERVICAL SPINE WO CONTRAST    Result Date: 12/31/2022  EXAMINATION: CT OF THE HEAD WITHOUT CONTRAST; CT OF THE CERVICAL SPINE WITHOUT CONTRAST 12/31/2022 1:58 am TECHNIQUE: CT of the head was performed without the administration of intravenous contrast. Automated exposure control, iterative reconstruction, and/or weight based adjustment of the mA/kV was utilized to reduce the radiation dose to as low as reasonably achievable.; CT of the cervical spine was performed without the administration of intravenous contrast. Multiplanar reformatted images are provided for review. Automated exposure control, iterative reconstruction, and/or weight based adjustment of the mA/kV was utilized to reduce the radiation dose to as low as reasonably achievable. COMPARISON: 12/07/2021 MRI cervical spine. 09/30/2020 MRI brain. HISTORY: ORDERING SYSTEM PROVIDED HISTORY: reduced mentation post fall R/O intracranial bleed TECHNOLOGIST PROVIDED HISTORY: reduced mentation post fall R/O intracranial bleed; ORDERING SYSTEM PROVIDED HISTORY: unwitnessed ground level fall. TECHNOLOGIST PROVIDED HISTORY: unwitnessed ground level fall. FINDINGS: CT BRAIN: BRAIN/VENTRICLES: There is no acute intracranial hemorrhage, mass effect or midline shift. No abnormal extra-axial fluid collection. Involutional parenchymal changes. Age-indeterminate (likely chronic) left basal ganglia lacunar infarct. No large territorial hypodensity. There is no evidence of hydrocephalus. Stable 0.6 cm peripherally calcified cystic pineal lesion, likely an incidental pineal cyst. ORBITS: The visualized portion of the orbits demonstrate no acute abnormality. SINUSES: The visualized paranasal sinuses and mastoid air cells demonstrate no acute abnormality. SOFT TISSUES/SKULL:  No acute abnormality of the visualized skull or soft tissues. CT CERVICAL SPINE: BONES/ALIGNMENT: There is no acute fracture or traumatic malalignment. DEGENERATIVE CHANGES: Multilevel cervical spondylosis, most notable at C5-C6 and C6-C7. No high-grade bony spinal canal stenosis. SOFT TISSUES: There is no prevertebral soft tissue swelling. Partially visualized right IJ approach chest port. CT BRAIN: 1. No evidence of acute hemorrhage, large territorial hypodensity, significant mass effect/midline shift, or ventriculomegaly 2. Involutional parenchymal changes. 3. Age-indeterminate (likely chronic) left basal ganglia lacunar infarct.   If clinically indicated, can consider further evaluation with MRI if no contraindications. CT CERVICAL SPINE: 1. No acute abnormality of the cervical spine. 2. Multilevel cervical spondylosis, most notable at C5-C6 and C6-C7. No high-grade bony spinal canal stenosis. CT ABDOMEN PELVIS W IV CONTRAST Additional Contrast? Radiologist Recommendation    Result Date: 1/3/2023  EXAMINATION: CT OF THE ABDOMEN AND PELVIS WITH CONTRAST 1/3/2023 1:58 am TECHNIQUE: CT of the abdomen and pelvis was performed with the administration of intravenous contrast. Multiplanar reformatted images are provided for review. Automated exposure control, iterative reconstruction, and/or weight based adjustment of the mA/kV was utilized to reduce the radiation dose to as low as reasonably achievable. COMPARISON: 12/27/2022 HISTORY: ORDERING SYSTEM PROVIDED HISTORY: rectal bleeding TECHNOLOGIST PROVIDED HISTORY: rectal bleeding Reason for Exam: rectal bleeding FINDINGS: Lower Chest: Small to moderate bilateral pleural effusions. Opacification along the posterior margin of the lower lobes suggesting compressive atelectasis. There is a small to moderate the effusions were present on the prior study as well. Mild thickening of the distal esophagus. Pericardial effusion as well. Organs: Cirrhotic liver with nodular margin. A tips shunt is present. The contrast timing is not dedicated for portal venous phase but the shunt appears to be patent. Gallbladder does contain multiple calcified gallstones and is mildly distended. There is ascites around the margin of the liver and in the upper abdomen degrading evaluation for the local inflammation. Spleen is stable. No pancreatic ductal dilatation and the pancreatic enhancement is normal.  Adrenal glands are not enlarged. Kidneys are enhancing equally without hydronephrosis or hydroureter. GI/Bowel: Mild thickening the gastric mucosal folds and into the distal esophagus.   There are fluid distended loops of small bowel in the mid and lower abdomen.  Edema of the wall of the small bowel loops in the right lower quadrant with the surrounding ascites.  Fluid is also present in the colon and rectum.  There is no significant solid fecal load.  There is a short segment of very collapsed rectum near the rectosigmoid junction which is worse than the recent exam and may represent spasm rather than underlying mass.  Scattered ascites in the abdomen but no pneumoperitoneum. Pelvis: Urinary bladder is collapsed around a Neal catheter and the wall is not evaluated.  The prostate is not enlarged. Peritoneum/Retroperitoneum: Atherosclerosis of the aorta and branches but no abdominal aortic aneurysm.  Peripherally calcified structure in the left upper quadrant could be a thrombosed splenic artery aneurysm measuring up to 3.1 cm but unchanged from prior exams.. Bones/Soft Tissues: Diffuse degenerative changes in the spine.  Stable appearance of T12. body wall edema.     1.  There is a short segment of narrowing of the rectum near the rectosigmoid junction compared to the recent exam and may represent spasm rather than underlying mass.  Fluid-filled loops of small bowel and colon suggesting diarrhea with enteritis or colitis.  Evaluation of true wall thickening is limited by the diffuse edematous state. 2.  Cirrhotic liver with tips, ascites, and sequela of portal hypertension. 3.  Thickening of the gastric mucosal folds and into the distal esophagus. Correlate with prior history of malignancy. 4.  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 5.  Pleural effusions and pericardial effusion are redemonstrated.  The opacified portions of the lower lungs appears to be from compressive atelectasis.     CT ABDOMEN PELVIS W IV CONTRAST Additional Contrast? None    Result Date: 12/27/2022  EXAMINATION: CT OF THE ABDOMEN AND PELVIS WITH CONTRAST 12/27/2022 3:31 pm TECHNIQUE: CT  of the abdomen and pelvis was performed with the administration of intravenous contrast. Multiplanar reformatted images are provided for review. Automated exposure control, iterative reconstruction, and/or weight based adjustment of the mA/kV was utilized to reduce the radiation dose to as low as reasonably achievable. COMPARISON: 11/03/2022 HISTORY: ORDERING SYSTEM PROVIDED HISTORY: eval shunt from recent TIPS TECHNOLOGIST PROVIDED HISTORY: eval shunt from recent TIPS Decision Support Exception - unselect if not a suspected or confirmed emergency medical condition->Emergency Medical Condition (MA) Reason for Exam: light headed, fatigue, nausea since TIPS surgery 12/1/22 FINDINGS: Lower Chest: Bibasal infiltrates. Moderate bilateral pleural effusions. Pericardial effusion. Circumferential wall thickening of the distal esophagus and gastroesophageal junction consistent with known esophageal malignancy. No significant change since prior examination. No esophageal obstruction. Abdomen/Pelvis: Organs: There is nodular contour of the liver with enlargement of the caudate and left hepatic lobes consistent with hepatic cirrhosis. No definite focal hepatic mass. Tips in place and appears patent. Cholelithiasis without evidence of acute cholecystitis. The pancreas demonstrates no acute abnormality. There is splenomegaly consistent with portal venous hypertension. Stable  stigmata of portal venous hypertension including esophageal varices and upper abdominal varices. No focal adrenal nodules. The kidneys enhance symmetrically without evidence of hydronephrosis. GI/Bowel: No evidence of abnormal bowel wall thickening or distention. Pelvis: The bladder is unremarkable. No pelvic mass or lymphadenopathy. Peritoneum/Retroperitoneum: There is progressive abdominal and pelvic ascites . No evidence of pneumoperitoneum.   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 No retroperitoneal lymphadenopathy. No evidence of gastrohepatic or periportal lymphadenopathy. Bones/Soft Tissues:   Age related degenerative changes of the visualized osseous structures without focal destructive lesion. Stable circumferential wall thickening of the distal esophagus and gastroesophageal junction consistent with known esophageal malignancy. No significant change since prior examination. No esophageal obstruction. Cirrhotic liver with associated portal venous hypertension, progressive ascites and stable splenomegaly. TIPS appears in place and patent Cholelithiasis Interval development of bibasal infiltrates and moderate bilateral pleural effusions as well as mild-to-moderate pericardial effusion     IR FLUORO GUIDED NEEDLE PLACEMENT    Result Date: 12/28/2022  PROCEDURE: Ultrasound-guided midline venous catheter placement 12/28/2022 TECHNIQUE: Sonography was utilized HISTORY: ORDERING SYSTEM PROVIDED HISTORY: midline TECHNOLOGIST PROVIDED HISTORY: midline Suspected GI bleed requiring multiple IV medications/fluid. CONTRAST: None SEDATION: None FLUOROSCOPY TIME: None DESCRIPTION OF PROCEDURE: Informed consent was obtained after a detailed explanation of the procedure including risks, benefits, and alternatives. Universal protocol was observed. The procedure was performed by the interventional radiology nurse under my supervision. Using ultrasound guidance, after anesthetizing the skin one percent lidocaine, a micropuncture needle was advanced into the patent right brachial vein. An ultrasound image demonstrating patency of the vein was stored in PACs. Appropriate blood return was obtained and an a micro wire was inserted. The needle was removed and a peel-away sheath was advanced over the wire to allow placement of a 15 cm midline venous catheter. This was secured and dressed appropriately.  The catheter aspirated and flushed without resistance. The patient tolerated the procedure well and left the Department stable condition. Dr. Marylee First was present. Successful ultrasound-guided placement of a right upper extremity midline venous catheter. US LIVER    Result Date: 12/28/2022  EXAMINATION: RIGHT UPPER QUADRANT ULTRASOUND 12/28/2022 4:16 pm COMPARISON: CT abdomen 12/27/2022 HISTORY: ORDERING SYSTEM PROVIDED HISTORY: check patency of TIPS FINDINGS: LIVER:  The liver demonstrates heterogeneous, multinodular pattern echogenicity without dominant hepatic lesion demonstrated sonographically. No evidence of intrahepatic biliary ductal dilatation. TIPS: Duplex and Doppler waveform imaging of the tips was performed with the following flow velocities: Distal 30.6 cm/second, mid 29.1 cm/second, proximal 33.6 cm/second. BILIARY SYSTEM:  Gallbladder demonstrates a number of layering, echogenic shadowing stones. No wall thickening evident. Negative sonographic Silva's sign. Common bile duct is within normal limits measuring 3 mm. RIGHT KIDNEY: The right kidney is grossly unremarkable without evidence of hydronephrosis. Right kidney long dimension 11.2 cm. PANCREAS:  Visualized portions of the pancreas are unremarkable. OTHER: Small volume right upper quadrant ascites. 1.  Cholelithiasis without definite findings of acute cholecystitis. 2. Patent TIPS. Specific velocities recorded above. 3. Small volume abdominal ascites. 4. Hepatic morphologic features typical of cirrhosis. US GALLBLADDER RUQ    Result Date: 12/30/2022  EXAMINATION: RIGHT UPPER QUADRANT ULTRASOUND 12/30/2022 9:26 am COMPARISON: CT scan of the abdomen and pelvis from 12/27/2022 HISTORY: ORDERING SYSTEM PROVIDED HISTORY: Gall stones, inc SIRS. R/O Cholecystitis TECHNOLOGIST PROVIDED HISTORY: Gall stones, inc SIRS.  R/O Cholecystitis FINDINGS: LIVER:  The liver demonstrates irregular contour and heterogeneous echogenicity compatible with known cirrhosis; intrahepatic biliary tree appears nondilated. Functioning tips stent identified (proximal, mid and distal velocities 54.1, 26.6, 25.8 cm/sec). BILIARY SYSTEM:  Gallbladder contains multiple known echogenic stones without wall thickening, or obvious wall edema. Negative sonographic Silva's sign. Common bile duct is within normal limits measuring 2.2 mm. RIGHT KIDNEY: The right kidney is grossly unremarkable without evidence of hydronephrosis. Right kidney measures 11.3 x 5.4 x 6.2 cm; cortical thickness 1.5 cm. PANCREAS:  Visualized portions of the pancreas are unremarkable. OTHER: Moderate right-sided ascites. Small-Moderate right pleural effusion. Multiple cholelithiasis but no ultrasound evidence cholecystitis. Nondilated biliary tree. Cirrhosis. Patent TIPS. Ascites. Additional findings, as above. XR CHEST PORTABLE    Result Date: 1/24/2023  EXAMINATION: ONE XRAY VIEW OF THE CHEST 1/24/2023 8:24 pm COMPARISON: 01/07/2023 CT chest and 01/03/2023 chest radiograph HISTORY: ORDERING SYSTEM PROVIDED HISTORY: cough TECHNOLOGIST PROVIDED HISTORY: cough Reason for Exam: cough FINDINGS: Right chest port catheter tip overlies the right atrium. The cardiomediastinal silhouette is enlarged, unchanged. Small left and trace right pleural effusions. Bibasilar and left midlung opacities are favored to represent edema and/or atelectasis. No pneumothorax. Osseous structures are unchanged. 1.  Small left and trace right pleural effusions. 2.  Opacities in the left perihilar region and bilateral lung bases may represent a combination of edema and atelectasis. Superimposed infectious process is not excluded. XR CHEST PORTABLE    Result Date: 1/3/2023  EXAMINATION: ONE XRAY VIEW OF THE CHEST 1/3/2023 7:22 am COMPARISON: 01/02/2023, 12/31/2022 HISTORY: ORDERING SYSTEM PROVIDED HISTORY: sepsis TECHNOLOGIST PROVIDED HISTORY: sepsis FINDINGS: Cardiac silhouette enlargement again noted.   Right internal jugular port in place. Vascular congestion, basilar opacities and small pleural effusions appear increasing. No pneumothorax identified. Increasing vascular congestion with small pleural effusions, suggestive of developing edema. XR CHEST PORTABLE    Result Date: 1/2/2023  EXAMINATION: ONE XRAY VIEW OF THE CHEST 1/2/2023 9:39 am COMPARISON: Chest x-ray dated 31 December 2022 HISTORY: ORDERING SYSTEM PROVIDED HISTORY: Bilateral arm swelling TECHNOLOGIST PROVIDED HISTORY: Bilateral arm swelling FINDINGS: Right-sided chest port catheter with the tip at the SVC/atrial junction. Mild right basilar atelectasis. Stable cardiomediastinal silhouette. Mild pulmonary vascular congestion     Mild pulmonary vascular congestion without evidence of overt edema     XR CHEST PORTABLE    Result Date: 12/31/2022  EXAMINATION: ONE XRAY VIEW OF THE CHEST 12/31/2022 6:22 am COMPARISON: 30 December 2022 HISTORY: ORDERING SYSTEM PROVIDED HISTORY: Bilateral pneumonia TECHNOLOGIST PROVIDED HISTORY: Bilateral pneumonia FINDINGS: AP portable view of the chest time stamped at 615 hours demonstrates overlying cardiac monitoring electrodes. Fusion port enters from the right terminating in the superior vena cava. Stable cardiomegaly. There is no significant interval change in bilateral effusions left greater than right, vascular congestion, basilar atelectasis and other pulmonary opacities which may represents superimposed edema or airspace disease. Little change from prior study. Cardiomegaly, vascular congestion, effusions, atelectasis and possible superimposed acute airspace disease are again noted. XR CHEST PORTABLE    Result Date: 12/30/2022  EXAMINATION: ONE XRAY VIEW OF THE CHEST 12/30/2022 9:03 am COMPARISON: AP chest from 12/27/2022 HISTORY: ORDERING SYSTEM PROVIDED HISTORY: Worsening PNA TECHNOLOGIST PROVIDED HISTORY: Worsening PNA FINDINGS: Right IJ chemo port tip position stable. Overlying ECG monitor leads. Mildly enlarged cardiac silhouette. Mediastinal structures midline unchanged. Cephalization of blood flow and hazy airspace opacities mid lower zones greater retrocardiac space, compatible with bilateral pleural effusions, compressive atelectasis, and possibly infiltrate. No Kerley lines but mild central vascular congestion likely. Bones unchanged. Cardiomegaly with probable mild central vascular congestion and similar bilateral pleural effusions with compressive atelectasis; filtrate at either base a consideration. No pulmonary edema. CT CHEST PULMONARY EMBOLISM W CONTRAST    Result Date: 1/7/2023  EXAMINATION: CTA OF THE CHEST 1/7/2023 10:07 pm TECHNIQUE: CTA of the chest was performed after the administration of intravenous contrast.  Multiplanar reformatted images are provided for review. MIP images are provided for review. Automated exposure control, iterative reconstruction, and/or weight based adjustment of the mA/kV was utilized to reduce the radiation dose to as low as reasonably achievable. COMPARISON: None. HISTORY: ORDERING SYSTEM PROVIDED HISTORY: SOB DVT TECHNOLOGIST PROVIDED HISTORY: SOB DVT Reason for Exam: SOB DVT FINDINGS: Pulmonary Arteries: Pulmonary arteries are adequately opacified for evaluation. No evidence of intraluminal filling defect to suggest pulmonary embolism. Main pulmonary artery is normal in caliber. Mediastinum: No evidence of mediastinal lymphadenopathy. The heart is normal size. Moderate pericardial effusion. .  There is no acute abnormality of the thoracic aorta. Lungs/pleura: Moderate bilateral pleural effusion is associated with atelectatic changes of right lower lobe and lingula and left lower lobe. . Focal consolidative change within the anterior aspect of right upper lobe peripheral aspect of left upper lobe and medial aspect of right middle lobe are likely suggestive of multifocal pneumonia. The lungs are otherwise without acute process. No pulmonary edema. No pneumothorax. Calcified granuloma within the lingula. Upper Abdomen: Cirrhotic liver with TIPS, ascites and portal hypertension . Calcified thrombosed splenic artery measuring 3.1 cm. .  Cholelithiasis with no signs of cholecystitis Soft Tissues/Bones: No acute bone or soft tissue abnormality. No evidence of pulmonary embolism. Moderate bilateral pleural effusion is associated with atelectatic changes of right lower lobe and lingula and left lower lobe. . Focal consolidative change within the anterior aspect of right upper lobe peripheral aspect of left upper lobe and medial aspect of right middle lobe are likely suggestive of multifocal pneumonia. Moderate pericardial effusion. Cirrhotic liver with TIPS, ascites and portal hypertension . Calcified thrombosed splenic artery measuring 3.1 cm. . RECOMMENDATIONS: Unavailable     [facility] UPPER EXTREMITY VENOUS RIGHT    Result Date: 1/7/2023    [facility]  Vascular Upper Extremities Veins Procedure   Patient Name   Tiffani Gillis    Date of Study           01/07/2023                 [facility]   Date of Birth  1960  Gender                  Male   Age            60 year(s)  Race                       Room Number    7234        Height:                 70 inch, 177.8 cm   Corporate ID # [id number]    Weight:                 192 pounds, 87.1 kg   Patient Acct # [de-identified]   BSA:        2.05 m^2    BMI:      27.55 kg/m^2   MR #           [id number]     Sonographer             Lopze Claros   Accession #    [id number]  Interpreting Physician  Deonte Vivas   Referring                  Referring Physician     Atul Lopez  Nurse  Practitioner  Procedure Type of Study:   Veins: Upper Extremities Veins, Venous Scan Upper Right. Indications for Study:R/O DVT and Arm swelling. Patient Status: In Patient.    - Critical Result:Madie HSU RN at 5:01 PM.  Conclusions   Summary   Simultaneous real time imaging utilizing B-Mode, color doppler and  spectral waveform analysis was performed on the right upper extremity for  venous examination of the deep and superficial systems. Findings are:   Right:  Acute deep venous thrombosis identified in the brachial vein. Superficial venous thrombosis of the cephalic vein in the forearm. Signature   ----------------------------------------------------------------  Electronically signed by [facility] Jordyn(Sonographer) on  01/07/2023 05:01 PM  ----------------------------------------------------------------   ----------------------------------------------------------------  Electronically signed by Jennifer Randhawa(Interpreting  physician) on 01/07/2023 07:54 PM  ----------------------------------------------------------------  Findings:   Right Impression: The brachial vein is partially compressible with hypoechoic echoes and  phasic color Doppler signals. The cephalic vein is non compressible with hypoechoic echoes with absent  color Doppler signals throughout the forearm. Internal jugular, subclavian, axillary, radial, proximal cephalic and  basilic veins are compressible with normal doppler responses. The ulnar vein was not visualized. Risk Factors History +---------+----+-----------------------------------------------------------+ ! Diagnosis! Date! Comments                                                   ! +---------+----+-----------------------------------------------------------+ ! Other    ! !Port-a-cath in place right chest.                          ! +---------+----+-----------------------------------------------------------+   - The patient's risk factor(s) include: dyslipidemia and arterial     hypertension.   - The patient has a former tobacco history. - The patient has esophageal cancer. Velocities are measured in cm/s ; Diameters are measured in cm Right UE Vein Measurements 2D Measurements +------------------------------------+----------+---------------+----------+ ! Location !Visualized! Compressibility! Thrombosis! +------------------------------------+----------+---------------+----------+ ! Prox IJV                            ! Yes       ! Yes            ! None      ! +------------------------------------+----------+---------------+----------+ ! Dist IJV                            ! Yes       ! Yes            ! None      ! +------------------------------------+----------+---------------+----------+ ! Prox SCV                            ! Yes       ! !None      ! +------------------------------------+----------+---------------+----------+ ! Dist SCV                            ! Yes       ! !None      ! +------------------------------------+----------+---------------+----------+ ! Prox Axillary                       ! Yes       ! Yes            ! None      ! +------------------------------------+----------+---------------+----------+ ! Dist Axillary                       ! Yes       ! Yes            ! None      ! +------------------------------------+----------+---------------+----------+ ! Prox Brachial                       !Yes       ! Partial        !AI        ! +------------------------------------+----------+---------------+----------+ ! Dist Brachial                       !Yes       ! Yes            ! None      ! +------------------------------------+----------+---------------+----------+ ! Prox Radial                         !Yes       ! Yes            ! None      ! +------------------------------------+----------+---------------+----------+ ! Dist Radial                         !Yes       ! Yes            ! None      ! +------------------------------------+----------+---------------+----------+ ! Prox Ulnar                          ! No        !               !          ! +------------------------------------+----------+---------------+----------+ ! Dist Ulnar                          ! No        !               !          ! +------------------------------------+----------+---------------+----------+ ! Basilic at UA                       ! Yes       ! Yes            ! None      ! +------------------------------------+----------+---------------+----------+ ! Basilic at AF                       ! Yes       ! Yes            ! None      ! +------------------------------------+----------+---------------+----------+ ! Basilic at [address]                       ! Yes       ! Yes            ! None      ! +------------------------------------+----------+---------------+----------+ ! Cephalic at UA                      ! Yes       ! Yes            ! None      ! +------------------------------------+----------+---------------+----------+ ! Cephalic at AF                      ! Yes       ! Yes            ! None      ! +------------------------------------+----------+---------------+----------+ ! Cephalic at [address]                      ! Yes       ! No             !AI        ! +------------------------------------+----------+---------------+----------+ Doppler Measurements +------------------------+-------------------------+-----------------------+ ! Location                ! Signal                   !Reflux                 ! +------------------------+-------------------------+-----------------------+ ! IJV                     ! Pulsatile                !                       ! +------------------------+-------------------------+-----------------------+ ! SCV                     ! Phasic                   !                       ! +------------------------+-------------------------+-----------------------+ ! Axillary                ! Phasic                   !                       ! +------------------------+-------------------------+-----------------------+ ! Brachial                !Phasic                   !                       ! +------------------------+-------------------------+-----------------------+ Left UE Vein Measurements Doppler Measurements +------------------------+-------------------------+-----------------------+ ! Location                ! Signal                   !Reflux                 ! +------------------------+-------------------------+-----------------------+ ! SCV                     ! Pulsatile                !                       ! +------------------------+-------------------------+-----------------------+    IR US GUIDED PARACENTESIS    Result Date: 1/20/2023  PROCEDURE: PARACENTESIS WITHOUT IMAGE GUIDANCE US ABDOMEN LIMITED 1/20/2023 HISTORY: ORDERING SYSTEM PROVIDED HISTORY: Alcoholic cirrhosis of liver with ascites ([address] TECHNOLOGIST PROVIDED HISTORY: Weekly due o amount drained every 2 weeks TECHNIQUE: Informed consent was obtained after a detailed explanation of the procedure including risks, benefits, and alternatives. Universal protocol was followed. A limited ultrasound of the abdomen was performed. The right abdomen was prepped and draped in sterile fashion and local anesthesia was achieved with lidocaine. A 5 [place] needle sheath was advanced into ascites and paracentesis was performed. The patient tolerated the procedure well. Final imaging showed absence of ascitic fluid right side. FINDINGS: Limited ultrasound of the abdomen demonstrates ascites. A total of 3.8 L was removed. Fluid was somewhat chylous in appearance. Successful therapeutic paracentesis. IR US GUIDED PARACENTESIS    Result Date: 1/13/2023  PROCEDURE: PARACENTESIS WITHOUT IMAGE GUIDANCE US ABDOMEN LIMITED 1/13/2023 HISTORY: ORDERING SYSTEM PROVIDED HISTORY: Alcoholic cirrhosis of liver with ascites ([address] TECHNOLOGIST PROVIDED HISTORY: Weekly due o amount drained every 2 weeks TECHNIQUE: Informed consent was obtained after a explanation of the procedure including risks. Universal protocol was followed. A limited ultrasound of the abdomen was performed.  The right abdomen was prepped and draped in sterile fashion, and local anesthesia was achieved with 1% lidocaine. A 5 [place]ian needle sheath was advanced into the ascites under direct ultrasound guidance (a sterile probe cover and gel were used), and paracentesis was performed. A total of 2.2 L of clear yellow fluid was aspirated. The patient tolerated the procedure well without immediately apparent complication. FINDINGS: Initial limited abdominal ultrasound demonstrated a moderate amount of ascites. Successful paracentesis     IR US GUIDED PARACENTESIS    Result Date: 1/3/2023  PROCEDURE: Ultrasound-guided paracentesis 1/3/2023 HISTORY: ORDERING SYSTEM PROVIDED HISTORY: ascites TECHNOLOGIST PROVIDED HISTORY: ascites TECHNIQUE: This procedure was performed by Rudolph Shafer PA-C under indirect supervision of . Informed consent was obtained after a detailed explanation of the procedure including the risks, benefits, and alternatives. Universal protocol was followed. All elements of maximal sterile barrier technique, including cap, mask, sterile gown, sterile gloves, sterile sheet, hand hygiene and 2% chlorhexidine for cutaneous antisepsis were followed. The area was prepped and draped in sterile fashion using maximum barrier technique and local anesthesia was achieved with lidocaine. Pre-procedure ultrasound shows a dominant fluid pocket in the right lowerquadrant. Using ultrasound guidance (image attached to the medical record), the fluid pocket was accessed with a 5 [place] Yueh needle with aspiration of clear yellow fluid. nexTune vacuum machine was connected and paracentesis was performed and approximately 2200 mL were removed. Post procedural ultrasound demonstrated no residual fluid. A sample was collected and sent for laboratory analysis. Estimated blood loss was minimal. The patient tolerated the procedure well and left the department in good condition. FINDINGS: Limited ultrasound of the abdomen demonstrates ascites. A total of 2200 mL of clear yellow fluid was removed.      Successful ultrasound-guided therapeutic paracentesis. IR US GUIDED PARACENTESIS    Result Date: 12/28/2022  PROCEDURE: PARACENTESIS WITH IMAGE GUIDANCE US ABDOMEN LIMITED 12/28/2022 HISTORY: ORDERING SYSTEM PROVIDED HISTORY: ascites TECHNOLOGIST PROVIDED HISTORY: ascites only remove 4 liters give albumin with paracentesis TECHNIQUE: Informed consent was obtained after a detailed explanation of the procedure including risks, benefits, and alternatives. Universal protocol was followed. A limited ultrasound of the abdomen was performed. The right abdomen was prepped and draped in sterile fashion and local anesthesia was achieved with lidocaine. An 5 English needle sheath was advanced into ascites and paracentesis was performed. The patient tolerated the procedure well. A sample was sent as requested. FINDINGS: Limited ultrasound of the abdomen demonstrates ascites. A total of 4.8 L was removed. Successful ultrasound-guided therapeutic and diagnostic paracentesis. IR REVISION TIPS    Result Date: 1/2/2023  PROCEDURE: VR REVISION OF TIPS AND COIL EMBOLIZATION LEFT GASTRIC VEIN General anesthesia 12/30/2022 HISTORY: ORDERING SYSTEM PROVIDED HISTORY:  TIPS early December, now with recurrent bleeding. Reassessment and possible revision/BATO requested. CONTRAST: Isovue 370-110/200 mL SEDATION: General anesthesia. FLUOROSCOPY DOSE AND TYPE OR TIME AND EXPOSURES: Fluoroscopy time-29.1 minutes. QFJ-35170.34 uGy cm squared. DESCRIPTION OF PROCEDURE: Informed consent was obtained from the patient's family (sister and her ) after discussions regarding the procedure, its risks, implications and possible complications. A time-out/universal protocol was obtained in the biplane angiography suite pain. The procedure was performed under general anesthesia.  Using 1% lidocaine for local anesthesia sterile technique under real-time sonographic guidance, a 21 gauge micropuncture needle was placed in the right internal jugular vein.  With Seldinger technique, a 10 Stateless sheath was advanced into the right atrium.  Right atrial pressure was measured (mean 12 mm Hg); an angled catheter was then used to select the existing TIPS stent, and manipulated into the portal vein.  Contrast material was injected manually opacifying the portal vein. The 5 Stateless angled catheter and guidewire were advanced into the portal vein and pressure measurements were obtained in the portal system.  A post TIPS 6 mm tripp-systemic gradient was found.  5 Stateless pigtail catheter was then inserted and splenoportography performed; digital images were obtained. The existing catheter was removed, and a rim catheter inserted which was manipulated into the left gastric vein; a Lantern microcatheter was then manipulated into the left gastric vein as far superiorly as possible. Multiple penumbra coils (pods, packing and Di coils).  An angled catheter was then manipulated into posterior gastric veins and additional hand contrast injections performed. The stent was then further dilated with a 10 mm balloon. Final digital angiograms were obtained verifying excellent patency of the shunt with no filling of varices. The sheath was then removed and a sterile bandage was placed at the puncture site in the right neck. FINDINGS: As above     TIPS stent reassessment showing 6 mm Hg portosystemic shunt; no significant variceal filling demonstrated, but successful left gastric vein embolization performed.  TIPS stent angioplasty to 10 mm with excellent flow demonstrated. The findings were discussed with Dr. Santos post procedure.     US GUIDED PARACENTESIS    Result Date: 1/6/2023  PROCEDURE: Ultrasound-guided paracentesis 1/6/2023 HISTORY: ORDERING SYSTEM PROVIDED HISTORY: ascities TECHNOLOGIST PROVIDED HISTORY: ascities TECHNIQUE: This procedure was performed by David Odom PA-C under indirect supervision of . Informed consent was obtained after a detailed explanation  of the procedure including the risks, benefits, and alternatives. Universal protocol was followed. All elements of maximal sterile barrier technique, including cap, mask, sterile gown, sterile gloves, sterile sheet, hand hygiene and 2% chlorhexidine for cutaneous antisepsis were followed. The area was prepped and draped in sterile fashion using maximum barrier technique and local anesthesia was achieved with lidocaine. Pre-procedure ultrasound shows a dominant fluid pocket in the right lowerquadrant. Using ultrasound guidance (image attached to the medical record), the fluid pocket was accessed with a 5 Western Lorrie Yueh needle with aspiration of clear yellow fluid. [employer] vacuum machine was connected and paracentesis was performed and approximately 1200 mL were removed. Post procedural ultrasound demonstrated no residual fluid. A sample was not sent for laboratory analysis. Estimated blood loss was minimal. The patient tolerated the procedure well and left the department in good condition. FINDINGS: Limited ultrasound of the abdomen demonstrates ascites. A total of 1200 mL of clear yellow fluid was removed. Successful ultrasound-guided paracentesis. MRI BRAIN WO CONTRAST    Result Date: 1/2/2023  EXAMINATION: MRI OF THE BRAIN WITHOUT CONTRAST  12/31/2022 2:30 pm TECHNIQUE: Multiplanar multisequence MRI of the brain was performed without the administration of intravenous contrast. COMPARISON: CT head 12/31/2022 and MRI brain 09/30/2020. HISTORY: ORDERING SYSTEM PROVIDED HISTORY: R/O basal ganglia infarct TECHNOLOGIST PROVIDED HISTORY: R/O basal ganglia infarct Reason for Exam: R/O basal ganglia infarct FINDINGS: INTRACRANIAL STRUCTURES/VENTRICLES: There is no acute infarct. No mass effect or midline shift. No evidence of an acute intracranial hemorrhage. The ventricles and sulci are normal in size and configuration. The sellar/suprasellar regions appear unremarkable.   The normal signal voids within the major intracranial vessels appear maintained. Mild involutional changes are identified within the brain. Small lacune is identified within the left basal ganglia. ORBITS: The visualized portion of the orbits demonstrate no acute abnormality. SINUSES: Moderate-sized bilateral mastoid effusions are identified. Small right maxillary sinus and left maxillary sinus polyps versus mucous retention cysts are noted. BONES/SOFT TISSUES: The bone marrow signal intensity appears normal. The soft tissues demonstrate no acute abnormality. Low T1 signal abnormality is identified within the marrow elements. These changes may represent a marrow replacement process such as anemia. No evidence of acute ischemia. New moderate-sized bilateral mastoid effusions. Small old lacune within the left basal ganglia. New low T1 signal within the marrow elements diffusely likely representing a marrow replacement process such as anemia.         Current Facility-Administered Medications   Medication Dose Route Frequency Provider Last Rate Last Admin    melatonin tablet 3 mg  3 mg Oral Nightly PRN Arianna Gideon, APRN - CNP   3 mg at 01/25/23 2027    butalbital-APAP-caffeine -40 MG per capsule 1 capsule  1 capsule Oral Q4H PRN Arianna Gideon, APRN - CNP   1 capsule at 01/25/23 2027    spironolactone (ALDACTONE) tablet 25 mg  25 mg Oral Daily Arianna Gideon, APRN - CNP   25 mg at 01/26/23 0846    sodium chloride flush 0.9 % injection 5-40 mL  5-40 mL IntraVENous 2 times per day Arianna Gideon, APRN - CNP   10 mL at 01/26/23 0847    sodium chloride flush 0.9 % injection 10 mL  10 mL IntraVENous PRN Arianna Gideon, APRN - CNP        0.9 % sodium chloride infusion   IntraVENous PRN Arianna Gideon, APRN - CNP        ondansetron (ZOFRAN-ODT) disintegrating tablet 4 mg  4 mg Oral Q8H PRN Arianna Gideon, APRN - CNP   4 mg at 01/25/23 2027    Or    ondansetron (ZOFRAN) injection 4 mg  4 mg IntraVENous Q6H PRN Arianna Gideon, APRN - CNP        magnesium hydroxide (MILK OF MAGNESIA) 400 MG/5ML suspension 30 mL  30 mL Oral Daily PRN Autumn Omaha, APRN - CNP        acetaminophen (TYLENOL) tablet 650 mg  650 mg Oral Q6H PRN Autumn Omaha, APRN - CNP        Or    acetaminophen (TYLENOL) suppository 650 mg  650 mg Rectal Q6H PRN Autumn Omaha, APRN - CNP        atorvastatin (LIPITOR) tablet 20 mg  20 mg Oral Nightly Autumn Omaha, APRN - CNP   20 mg at 01/25/23 2027    ferrous sulfate (IRON 325) tablet 325 mg  325 mg Oral BID WC Autumn Omaha, APRN - CNP   325 mg at 01/26/23 0846    furosemide (LASIX) tablet 40 mg  40 mg Oral BID Autumn Omaha, APRN - CNP   40 mg at 01/26/23 0846    lactulose (CHRONULAC) 10 GM/15ML solution 20 g  20 g Oral TID Autumn Omaha, APRN - CNP   20 g at 01/26/23 0847    pantoprazole (PROTONIX) tablet 40 mg  40 mg Oral Daily Autumn Omaha, APRN - CNP   40 mg at 01/26/23 7301       Impressions :     1. Principal Problem:    Symptomatic anemia, microcytic, acute  Active Problems:    Ascites due to alcoholic cirrhosis ([facility] [address]  Resolved Problems:    * No resolved hospital problems.  *        2.  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 [address] Alcoholic cirrhosis of liver with ascites ([address] Anemia (04/13/2022), Anxiety, Arthritis, Back pain, chronic, Lezama esophagus, Bleeding gastric varices (12/29/2022), BPH (benign prostatic hypertrophy), Cholelithiasis, Cirrhosis ([address] COVID-19 (12/2020), COVID-19 vaccine series completed (5/20/2021, 6/22/2021), DDD (degenerative disc disease), lumbar, Depression, Esophageal cancer ([address] Esophageal varices ([address] Fatty liver, GERD (gastroesophageal reflux disease), GI bleed, Hep C w/o coma, chronic ([address] History of alcohol abuse, History of blood transfusion, History of colon polyps (2016), History of tobacco abuse, [place] (hard of hearing), Hyperlipidemia, Hypertension, Hyponatremia (07/20/2016), Hypotension (12/20/2021), Mastoid disorder, bilateral (12/31/2022), PONV (postoperative nausea and vomiting), Port-A-Cath in place, Portal hypertension ([facility] [address] Sciatica, Secondary esophageal varices ([facility] [address] (06/07/2022), Shortness of breath, Spinal stenosis, Stomach ulcer, Thrombocytopenia ([facility] [address] (12/23/2020), Tubular adenoma of colon (2016, 2018), Vitamin D deficiency, and Wears glasses.      Plans:     50-year-old male presenting with abnormal lab hemoglobin of 6.4 outpatient, 5.7 in ER  Status post TIPS procedure in November for cirrhosis and subsequent bleed in December from TIPS, with embolization of left gastric vein  History of alcoholic liver disease with cirrhosis, history of EV with banding, esophageal cancer that is post chemo and RT    Severe anemia hemoglobin 5.72 unit PRBC administered GI consulted hemodynamically stable  Alcoholic cirrhosis with ascites status post TIPS-paracentesis every Friday continuing home Lasix spironolactone  Hyponatremia likely secondary to hypervolemia, liver disease-continue Lasix, Aldactone  Alcohol intoxication- on arrival    1/25  Due for ultrasound liver to evaluate TIPS per GI monitor hemoglobin transfuse to keep 7 or above    1/26  Patient did have melanotic stoolX2 overnight with drop in hemoglobin  Made n.p.o., has had only clear liquid this morning we will reach out to GI hopefully EGD today  No new complaints subjectively feeling fatigued but otherwise at baseline    DVT pplx-mechanical only      Cassandra Herrera MD  1/26/2023  11:28 AM

## 2023-01-27 LAB
ANION GAP SERPL CALCULATED.3IONS-SCNC: 6 MMOL/L (ref 9–17)
BUN BLDV-MCNC: 10 MG/DL (ref 8–23)
CALCIUM SERPL-MCNC: 8 MG/DL (ref 8.6–10.4)
CHLORIDE BLD-SCNC: 99 MMOL/L (ref 98–107)
CO2: 26 MMOL/L (ref 20–31)
CREAT SERPL-MCNC: 0.67 MG/DL (ref 0.7–1.2)
GFR SERPL CREATININE-BSD FRML MDRD: >60 ML/MIN/1.73M2
GLUCOSE BLD-MCNC: 137 MG/DL (ref 70–99)
HCT VFR BLD CALC: 24.1 % (ref 41–53)
HCT VFR BLD CALC: 25.2 % (ref 41–53)
HCT VFR BLD CALC: 26.7 % (ref 41–53)
HCT VFR BLD CALC: 27.2 % (ref 41–53)
HEMOGLOBIN: 7.7 G/DL (ref 13.5–17.5)
HEMOGLOBIN: 8.1 G/DL (ref 13.5–17.5)
HEMOGLOBIN: 8.2 G/DL (ref 13.5–17.5)
HEMOGLOBIN: 8.4 G/DL (ref 13.5–17.5)
MCH RBC QN AUTO: 27.1 PG (ref 26–34)
MCHC RBC AUTO-ENTMCNC: 32.1 G/DL (ref 31–37)
MCV RBC AUTO: 84.3 FL (ref 80–100)
PDW BLD-RTO: 19.4 % (ref 11.5–14.9)
PLATELET # BLD: 99 K/UL (ref 150–450)
PMV BLD AUTO: 7 FL (ref 6–12)
POTASSIUM SERPL-SCNC: 3.9 MMOL/L (ref 3.7–5.3)
RBC # BLD: 2.99 M/UL (ref 4.5–5.9)
SODIUM BLD-SCNC: 131 MMOL/L (ref 135–144)
WBC # BLD: 5.9 K/UL (ref 3.5–11)

## 2023-01-27 PROCEDURE — 2580000003 HC RX 258: Performed by: NURSE PRACTITIONER

## 2023-01-27 PROCEDURE — 6370000000 HC RX 637 (ALT 250 FOR IP): Performed by: INTERNAL MEDICINE

## 2023-01-27 PROCEDURE — 85018 HEMOGLOBIN: CPT

## 2023-01-27 PROCEDURE — 99232 SBSQ HOSP IP/OBS MODERATE 35: CPT | Performed by: INTERNAL MEDICINE

## 2023-01-27 PROCEDURE — 6360000002 HC RX W HCPCS: Performed by: NURSE PRACTITIONER

## 2023-01-27 PROCEDURE — 36415 COLL VENOUS BLD VENIPUNCTURE: CPT

## 2023-01-27 PROCEDURE — 6370000000 HC RX 637 (ALT 250 FOR IP): Performed by: NURSE PRACTITIONER

## 2023-01-27 PROCEDURE — 85027 COMPLETE CBC AUTOMATED: CPT

## 2023-01-27 PROCEDURE — 80048 BASIC METABOLIC PNL TOTAL CA: CPT

## 2023-01-27 PROCEDURE — 2580000003 HC RX 258: Performed by: INTERNAL MEDICINE

## 2023-01-27 PROCEDURE — 6360000002 HC RX W HCPCS: Performed by: INTERNAL MEDICINE

## 2023-01-27 PROCEDURE — 85014 HEMATOCRIT: CPT

## 2023-01-27 PROCEDURE — 2060000000 HC ICU INTERMEDIATE R&B

## 2023-01-27 RX ADMIN — SUCRALFATE 1 G: 1 TABLET ORAL at 20:52

## 2023-01-27 RX ADMIN — OCTREOTIDE ACETATE 25 MCG/HR: 500 INJECTION, SOLUTION INTRAVENOUS; SUBCUTANEOUS at 18:54

## 2023-01-27 RX ADMIN — OCTREOTIDE ACETATE 50 MCG/HR: 500 INJECTION, SOLUTION INTRAVENOUS; SUBCUTANEOUS at 03:29

## 2023-01-27 RX ADMIN — PANTOPRAZOLE SODIUM 40 MG: 40 TABLET, DELAYED RELEASE ORAL at 07:57

## 2023-01-27 RX ADMIN — FERROUS SULFATE TAB 325 MG (65 MG ELEMENTAL FE) 325 MG: 325 (65 FE) TAB at 07:57

## 2023-01-27 RX ADMIN — FUROSEMIDE 40 MG: 40 TABLET ORAL at 07:57

## 2023-01-27 RX ADMIN — SUCRALFATE 1 G: 1 TABLET ORAL at 12:26

## 2023-01-27 RX ADMIN — FUROSEMIDE 40 MG: 40 TABLET ORAL at 16:47

## 2023-01-27 RX ADMIN — SPIRONOLACTONE 25 MG: 25 TABLET ORAL at 07:57

## 2023-01-27 RX ADMIN — FERROUS SULFATE TAB 325 MG (65 MG ELEMENTAL FE) 325 MG: 325 (65 FE) TAB at 16:47

## 2023-01-27 RX ADMIN — SUCRALFATE 1 G: 1 TABLET ORAL at 07:57

## 2023-01-27 RX ADMIN — ATORVASTATIN CALCIUM 20 MG: 20 TABLET, FILM COATED ORAL at 20:52

## 2023-01-27 RX ADMIN — SUCRALFATE 1 G: 1 TABLET ORAL at 16:47

## 2023-01-27 RX ADMIN — LACTULOSE 20 G: 20 SOLUTION ORAL at 12:26

## 2023-01-27 NOTE — PROGRESS NOTES
01/27/23 1402   Encounter Summary   Encounter Overview/Reason   Encounter   Service Provided For: Patient   Referral/Consult From: Ralph   Last Encounter  01/27/23   Complexity of Encounter Low   Spiritual/Emotional needs   Type Spiritual Support   Rituals, Rites and Sacraments   Type Anointing  (Colonel Foil 1/27/23)

## 2023-01-27 NOTE — CARE COORDINATION
ONGOING DISCHARGE PLAN:    Patient is alert and oriented x4. Spoke with patient regarding discharge plan and patient confirms that plan is still  to Return to home w/ no needs. Will have [facility] on DC. HGB today 8.4,      POD #1, EGD w/ GI. Remains on Sandostatin GTT & Full Liquid Diet. Will continue to follow for additional discharge needs.     Electronically signed by Beuford Gaucher, RN on 1/27/2023 at 9:05 AM

## 2023-01-27 NOTE — PLAN OF CARE
Problem: Discharge Planning  Goal: Discharge to home or other facility with appropriate resources  1/27/2023 0527 by Dorcas Deal RN  Outcome: Progressing  1/26/2023 1813 by Kourtney Anders RN  Outcome: Progressing     Problem: ABCDS Injury Assessment  Goal: Absence of physical injury  1/27/2023 0527 by Dorcas Deal RN  Outcome: Progressing  1/26/2023 1813 by Kourtney Anders RN  Outcome: Progressing     Problem: Safety - Adult  Goal: Free from fall injury  1/27/2023 0527 by Dorcas Deal RN  Outcome: Progressing  1/26/2023 1813 by Kourtney Anders RN  Outcome: Progressing     Problem: Pain  Goal: Verbalizes/displays adequate comfort level or baseline comfort level  1/27/2023 0527 by Dorcas Deal RN  Outcome: Progressing  1/26/2023 1813 by Kourtney Anders RN  Outcome: Progressing     Problem: Chronic Conditions and Co-morbidities  Goal: Patient's chronic conditions and co-morbidity symptoms are monitored and maintained or improved  1/27/2023 0527 by Dorcas Deal RN  Outcome: Progressing  1/26/2023 1813 by Kourtney Anders RN  Outcome: Progressing     Problem: Nutrition Deficit:  Goal: Optimize nutritional status  1/27/2023 0527 by Dorcas Deal RN  Outcome: Progressing  1/26/2023 1813 by Kourtney Anders RN  Outcome: Progressing

## 2023-01-27 NOTE — DISCHARGE SUMMARY
[facility]  Office: [address], DO, Jeanette Arreola, DO, Janey Soriano, DO, Terrell Jamison Blood, DO, Cherry Manuel MD, Adela Silva MD, Kennis Osler, MD, Michelle Ngo MD,  Jean Marie Davison MD, Lillian Meadows MD, Kaylee Ortega, DO, Latesha Wilson MD,  Yaniv Dixon, DO, Ag Olguin MD, Patricia Zaragoza MD, Alyson Huerta DO, Orlando Espino MD, Joe Carmona MD, Patt Fernández DO, Cornell Jaimes MD, Meghana Lee MD, Chaim Ibrahim MD, Dc Chua MD, Jermaine Grimes DO, Sintia Schwartz MD, Kristin Jaimes MD, Saundra Jaramillo, CNP,  Karen Guzman, CNP, Vinay Barajas, CNP, Kendrick Restrepo, CNP,  Aravind Parker, [facility], Jhoan Christine, CNP, Amanda Dixon CNP, Hang Sweeney CNP, Meseret Mcfadden, CNP, Tiffanie Daly, CNP, Lucia Nela PA-C, Ngoc Martinez, CNS, Dre Moreno CNP, Lia Ferrari,  [facility]    Discharge Summary     Patient ID: Malachi Leonard  :  [id number]   MRN: [id number]     ACCOUNT:  [de-identified]   Patient's PCP: VASU Barroso  Admit Date: 2022   Discharge Date: 2023     Length of Stay: 14  Code Status:  Full Code  Admitting Physician: Olamide Kemp DO  Discharge Physician: Patt Fernández DO     Active Discharge Diagnoses:     Hospital Problem Lists:  Active Problems:    Ascites due to alcoholic cirrhosis [facility])    Shortness of breath    Other abnormalities of gait and mobility    S/P TIPS (transjugular intrahepatic portosystemic shunt)    Lezama's esophagus with dysplasia    Acute deep vein thrombosis (DVT) of brachial vein of right upper extremity (HCC)    Chronic hepatitis C without hepatic coma (HCC)    Swelling of joint of upper arm, right    Anasarca    Decompensation of cirrhosis of liver (HCC)    DDD (degenerative disc disease), lumbar    Acute hypokalemia    Mastoid disorder, bilateral  Resolved Problems:    Esophageal varices with bleeding ([address]    Acute blood loss anemia    Near syncope    Bleeding gastric varices    Hepatic encephalopathy    Periumbilical abdominal pain    Encephalopathy acute    CRP elevated    Community acquired bilateral lower lobe pneumonia    Pneumonia of both lower lobes due to infectious organism    Acute hyperactive alcohol withdrawal delirium ([address]    Acute upper GI hemorrhage    Gastrointestinal hemorrhage with melena    Admission Condition:  fair     Discharged Condition: good    Hospital Stay:     Hospital Course:      Mr. Yovani Adkins is a 76-year-old male with a past medical history of esophageal cancer, hepatic encephalopathy, EtOH history with decompensated liver failure status post TIPS who initially presented with the chief complaint of abdominal pain on 12/28/2022. He was initially seen at [facility] and found found to be anemic with extensive ascites. He was started on octreotide and transferred to [facility] for reevaluation of TIPS. TIPS was patent. Paracentesis was done on 1228 culture was negative and WBCs did not suggest infection. Underwent EGD 12/29/22 demonstrated large esophageal varices in the lower esophagus and large gastric varices in the cardia and body with one of the varix with minimal oozing of blood. Recommendations made for TIPS revision. Underwent TIPS revision 12/30/2022 with up sizing of stent and embolization of the left gastric vein. CT abdomen pelvis 1/3/23 demonstrated possible bilateral lobe atelectasis with concern for pneumonia so infectious disease was consulted. Initially treated with broad-spectrum antibiotics and eventually de-escalated to Levaquin until 1/7/2023.   Due to persistent rectal bleeding patient underwent EGD again on 1/3/2023 at this time there were 2 columns of grade 1 to grade 2 varices in the distal esophagus without stigmata of bleeding but a few AVMs found in the cardia and gastric fundus patient was continued on pantoprazole 40 mg daily and started on nonselective beta-blocker. He did develop hepatic encephalopathy during his admission which required lactulose. Patient's mentation eventually improved and his diuretics were optimized with Lasix 40 mg daily and Aldactone 25 mg daily. He was discharged home and instructed to follow closely with GI. Significant therapeutic interventions: EGD x2 and TIPS revision. Significant Diagnostic Studies:   Labs / Micro:  CBC:   Lab Results   Component Value Date/Time    WBC 5.9 01/27/2023 07:49 AM    RBC 2.99 01/27/2023 07:49 AM    RBC 4.75 04/19/2012 11:30 AM    HGB 8.2 01/27/2023 02:06 PM    HCT 26.7 01/27/2023 02:06 PM    MCV 84.3 01/27/2023 07:49 AM    MCH 27.1 01/27/2023 07:49 AM    MCHC 32.1 01/27/2023 07:49 AM    RDW 19.4 01/27/2023 07:49 AM    PLT 99 01/27/2023 07:49 AM     04/19/2012 11:30 AM     BMP:    Lab Results   Component Value Date/Time    GLUCOSE 137 01/27/2023 07:49 AM    GLUCOSE 65 04/19/2012 11:30 AM     01/27/2023 07:49 AM    K 3.9 01/27/2023 07:49 AM    CL 99 01/27/2023 07:49 AM    CO2 26 01/27/2023 07:49 AM    ANIONGAP 6 01/27/2023 07:49 AM    BUN 10 01/27/2023 07:49 AM    CREATININE 0.67 01/27/2023 07:49 AM    BUNCRER NOT REPORTED 02/02/2022 09:35 AM    CALCIUM 8.0 01/27/2023 07:49 AM    LABGLOM >60 01/27/2023 07:49 AM    GFRAA >60 09/28/2022 01:33 PM    GFR      09/28/2022 01:33 PM     HFP:    Lab Results   Component Value Date/Time    PROT 5.0 01/24/2023 10:00 PM     Radiology:  US LIVER    Result Date: 1/26/2023  Cirrhotic appearance of liver with a TIPS stent which appears grossly patent. Ascites. Small right pleural effusion. Cholelithiasis. XR CHEST PORTABLE    Result Date: 1/24/2023  1. Small left and trace right pleural effusions. 2.  Opacities in the left perihilar region and bilateral lung bases may represent a combination of edema and atelectasis. Superimposed infectious process is not excluded.        Consultations:    Consults:     Final Specialist Recommendations/Findings:   IP CONSULT TO GI  IP CONSULT TO SOCIAL WORK  IP CONSULT TO OTOLARYNGOLOGY  IP CONSULT TO IV TEAM  IP CONSULT TO GI  IP CONSULT TO GI  IP CONSULT TO INFECTIOUS DISEASES  IP CONSULT TO PALLIATIVE CARE  IP CONSULT TO SPIRITUAL SERVICES  IP CONSULT TO VASCULAR SURGERY  IP CONSULT TO HOME CARE NEEDS      The patient was seen and examined on day of discharge and this discharge summary is in conjunction with any daily progress note from day of discharge. Discharge plan:     Disposition: Home    Physician Follow Up:     M[address]z Alcal[address] [id number]  [address], [place] ProcEmily Mcnulty [address] [facility]  [phone]    Schedule an appointment as soon as possible for a visit in 3 week(s)      [facility]  [address] [facility] [address] [phone]  Schedule an appointment as soon as possible for a visit in 2 week(s)      Brigido Hansen, [address] [facility]  [address] [phone]    Schedule an appointment as soon as possible for a visit in 3 day(s)         Requiring Further Evaluation/Follow Up POST HOSPITALIZATION/Incidental Findings: Right upper extremity DVT. Due to risk of bleeding was treated conservatively with warm compresses which was discussed with vascular surgery and GI. Patient will need outpatient follow-up. Diet:  Low-sodium diet 1500 mL restriction. Activity: As tolerated    Instructions to Patient:   Follow-up with PCP in 3 days. Call for an appointment soon as possible. Follow-up with gastroenterology as instructed. Call for an appointment as soon as possible. Medications as instructed. Obtain BMP in 1 week and follow-up with your PCP regarding the results. Return to the emergency department immediately for any new or worsening concerns.     Discharge Medications:      Medication List        CHANGE how you take these medications      furosemide 40 MG tablet  Commonly known as: LASIX  Take 1 tablet by mouth in the morning and 1 tablet in the evening. What changed:   medication strength  how much to take  when to take this     spironolactone 25 MG tablet  Commonly known as: ALDACTONE  Take 1 tablet by mouth daily  What changed:   medication strength  how much to take  when to take this            CONTINUE taking these medications      atorvastatin 20 MG tablet  Commonly known as: LIPITOR  Take 1 tablet by mouth nightly     FeroSul 325 (65 Fe) MG tablet  Generic drug: ferrous sulfate  take 1 tablet by mouth twice a day     pantoprazole 40 MG tablet  Commonly known as: PROTONIX            STOP taking these medications      diphenhydrAMINE 25 MG tablet  Commonly known as: BENADRYL     midodrine 5 MG tablet  Commonly known as: PROAMATINE     nadolol 20 MG tablet  Commonly known as: CORGARD     ondansetron 4 MG disintegrating tablet  Commonly known as: ZOFRAN-ODT               Where to Get Your Medications        These medications were sent to [address] [facility], [facility]  [address] [place] [address], [address] [facility]      Phone: [phone]   furosemide 40 MG tablet  spironolactone 25 MG tablet         Discharge Procedure Orders   Basic Metabolic Panel   Standing Status: Future Standing Exp. Date: 01/10/24   Order Comments: Please send results to patient's PCP     Javier Olivares   Referral Priority: Routine Referral Type: Eval and Treat   Referral Reason: Specialty Services Required   Requested Specialty: Palliative Medicine   Number of Visits Requested: 1       Time Spent on discharge is  31 mins in patient examination, evaluation, counseling as well as medication reconciliation, prescriptions for required medications, discharge plan and follow up.     Electronically signed by   Antno Crowell DO  1/27/2023  6:45 PM      Thank you VASU Cano for the opportunity to be involved in this patient's care.

## 2023-01-27 NOTE — PROGRESS NOTES
GI Progress notes    1/27/2023   11:32 AM    Name:  Francis Montano  MRN:    [id number]     Acct:     [de-identified]   Room:  2089/2089-01   Day: 3     Admit Date: 1/24/2023  8:07 PM  PCP: VASU Mary    Subjective:     C/C:   Chief Complaint   Patient presents with    Abnormal Lab     Pt reports a hemoglobin of 6.0       Interval History: Status: improved. Patient seen and examined  No acute events overnight  Hgb relatively stable  Tolerating CLD  S/p EGD yesterday revealing oozing of blood from teleangiectasia-like lesions in the lower esophagus and HH, APC cauterization. Abdomen soft. Alert, oriented    ROS:  Constitutional: negative for chills, fevers and sweats  Gastrointestinal: negative for abdominal pain, constipation, diarrhea, nausea and vomiting  Neurological: negative for dizziness and headaches    Medications:      Allergies: No Known Allergies    Current Meds: sucralfate (CARAFATE) tablet 1 g, 4x Daily  octreotide (SANDOSTATIN) 500 mcg in sodium chloride 0.9 % 100 mL infusion, Continuous  melatonin tablet 3 mg, Nightly PRN  butalbital-APAP-caffeine -40 MG per capsule 1 capsule, Q4H PRN  spironolactone (ALDACTONE) tablet 25 mg, Daily  sodium chloride flush 0.9 % injection 5-40 mL, 2 times per day  sodium chloride flush 0.9 % injection 10 mL, PRN  0.9 % sodium chloride infusion, PRN  ondansetron (ZOFRAN-ODT) disintegrating tablet 4 mg, Q8H PRN   Or  ondansetron (ZOFRAN) injection 4 mg, Q6H PRN  magnesium hydroxide (MILK OF MAGNESIA) 400 MG/5ML suspension 30 mL, Daily PRN  acetaminophen (TYLENOL) tablet 650 mg, Q6H PRN   Or  acetaminophen (TYLENOL) suppository 650 mg, Q6H PRN  atorvastatin (LIPITOR) tablet 20 mg, Nightly  ferrous sulfate (IRON 325) tablet 325 mg, BID WC  furosemide (LASIX) tablet 40 mg, BID  lactulose (CHRONULAC) 10 GM/15ML solution 20 g, TID  pantoprazole (PROTONIX) tablet 40 mg, Daily        Data:     Code Status:  Full Code    Family History   Problem Relation Age of Onset    Cancer Mother         pancreatic    Cancer Father         bone    Diabetes Sister     Asthma Brother     Allergies Brother         MULTIPLE       Social History     Socioeconomic History    Marital status: Single     Spouse name: Not on file    Number of children: Not on file    Years of education: Not on file    Highest education level: Not on file   Occupational History    Not on file   Tobacco Use    Smoking status: Former     Packs/day: 1.00     Years: 45.00     Pack years: 45.00     Types: Cigarettes     Quit date: 2017     Years since quittin.0    Smokeless tobacco: Never   Vaping Use    Vaping Use: Never used   Substance and Sexual Activity    Alcohol use: Not Currently     Comment: Quit alcohol in 2019- heavier drinking prior to quitting    Drug use: Not Currently     Frequency: 1.0 times per week     Types: Cocaine     Comment: Cocaine- stopped spring 2016    Sexual activity: Yes     Partners: Female   Other Topics Concern    Not on file   Social History Narrative     in the past, retired     Social Determinants of Health     Financial Resource Strain: Low Risk     Difficulty of Paying Living Expenses: Not hard at all   Food Insecurity: No Food Insecurity    Worried About [address] [employer] [place] in the Last Year: Never true    [employer]  [place] in the Last Year: Never true   Transportation Needs: Not on file   Physical Activity: Inactive    Days of Exercise per Week: 0 days    Minutes of Exercise per Session: 0 min   Stress: Not on file   Social Connections: Not on file   Intimate Partner Violence: Not on file   Housing Stability: Not on file       Vitals:  /62   Pulse 82   Temp 98 °F (36.7 °C)   Resp 16   Ht 5' 10\" (1.778 m)   Wt 192 lb 14.4 oz (87.5 kg)   SpO2 96%   BMI 27.68 kg/m²   Temp (24hrs), Av.1 °F (36.7 °C), Min:97.1 °F (36.2 °C), Max:98.4 °F (36.9 °C)    No results for input(s): POCGLU in the last 72 hours. I/O (24Hr):     Intake/Output Summary (Last 24 hours) at 1/27/2023 1132  Last data filed at 1/26/2023 1848  Gross per 24 hour   Intake 1155 ml   Output --   Net 1155 ml       Labs:      CBC:   Lab Results   Component Value Date/Time    WBC 5.9 01/27/2023 07:49 AM    RBC 2.99 01/27/2023 07:49 AM    RBC 4.75 04/19/2012 11:30 AM    HGB 8.1 01/27/2023 07:49 AM    HCT 25.2 01/27/2023 07:49 AM    MCV 84.3 01/27/2023 07:49 AM    MCH 27.1 01/27/2023 07:49 AM    MCHC 32.1 01/27/2023 07:49 AM    RDW 19.4 01/27/2023 07:49 AM    PLT 99 01/27/2023 07:49 AM     04/19/2012 11:30 AM    MPV 7.0 01/27/2023 07:49 AM     CBC with Differential:    Lab Results   Component Value Date/Time    WBC 5.9 01/27/2023 07:49 AM    RBC 2.99 01/27/2023 07:49 AM    RBC 4.75 04/19/2012 11:30 AM    HGB 8.1 01/27/2023 07:49 AM    HCT 25.2 01/27/2023 07:49 AM    PLT 99 01/27/2023 07:49 AM     04/19/2012 11:30 AM    MCV 84.3 01/27/2023 07:49 AM    MCH 27.1 01/27/2023 07:49 AM    MCHC 32.1 01/27/2023 07:49 AM    RDW 19.4 01/27/2023 07:49 AM    NRBC 1 06/03/2022 06:20 AM    METASPCT 2 10/30/2022 05:55 AM    LYMPHOPCT 18 01/24/2023 10:00 PM    MONOPCT 3 01/24/2023 10:00 PM    MYELOPCT 1 06/01/2021 08:25 AM    BASOPCT 0 01/24/2023 10:00 PM    MONOSABS 0.14 01/24/2023 10:00 PM    LYMPHSABS 0.86 01/24/2023 10:00 PM    EOSABS 0.05 01/24/2023 10:00 PM    BASOSABS 0.00 01/24/2023 10:00 PM    DIFFTYPE NOT REPORTED 02/02/2022 09:35 AM     Hemoglobin/Hematocrit:    Lab Results   Component Value Date/Time    HGB 8.1 01/27/2023 07:49 AM    HCT 25.2 01/27/2023 07:49 AM     CMP:    Lab Results   Component Value Date/Time     01/27/2023 07:49 AM    K 3.9 01/27/2023 07:49 AM    CL 99 01/27/2023 07:49 AM    CO2 26 01/27/2023 07:49 AM    BUN 10 01/27/2023 07:49 AM    CREATININE 0.67 01/27/2023 07:49 AM    GFRAA >60 09/28/2022 01:33 PM    LABGLOM >60 01/27/2023 07:49 AM    GLUCOSE 137 01/27/2023 07:49 AM    GLUCOSE 65 04/19/2012 11:30 AM    PROT 5.0 01/24/2023 10:00 PM    LABALBU 2.4 01/24/2023 10:00 PM    LABALBU 4.7 04/19/2012 11:30 AM    CALCIUM 8.0 01/27/2023 07:49 AM    BILITOT 1.5 01/24/2023 10:00 PM    ALKPHOS 197 01/24/2023 10:00 PM    AST 69 01/24/2023 10:00 PM    ALT 25 01/24/2023 10:00 PM     BMP:    Lab Results   Component Value Date/Time     01/27/2023 07:49 AM    K 3.9 01/27/2023 07:49 AM    CL 99 01/27/2023 07:49 AM    CO2 26 01/27/2023 07:49 AM    BUN 10 01/27/2023 07:49 AM    LABALBU 2.4 01/24/2023 10:00 PM    LABALBU 4.7 04/19/2012 11:30 AM    CREATININE 0.67 01/27/2023 07:49 AM    CALCIUM 8.0 01/27/2023 07:49 AM    GFRAA >60 09/28/2022 01:33 PM    LABGLOM >60 01/27/2023 07:49 AM    GLUCOSE 137 01/27/2023 07:49 AM    GLUCOSE 65 04/19/2012 11:30 AM     PT/INR:    Lab Results   Component Value Date/Time    PROTIME 17.0 01/24/2023 10:00 PM    INR 1.4 01/24/2023 10:00 PM     PTT:    Lab Results   Component Value Date/Time    APTT 35.9 01/02/2023 10:55 AM   [APTT}    Physical Examination:        General appearance: alert, cooperative and no distress  Mental Status: oriented to person, place and time and normal affect  Lungs: clear to auscultation bilaterally, normal effort  Heart: regular rate and rhythm, no murmur,  Abdomen: soft, nontender, nondistended, bowel sounds present all four quadrants, no masses, hepatomegaly or splenomegaly  Extremities: no edema, redness or tenderness in the calves  Skin: no gross lesions, rashes, or induration    Assessment:        Primary Problem  Symptomatic anemia     Active Hospital Problems    Diagnosis Date Noted    Ascites due to alcoholic cirrhosis ([facility] [K64.58] 04/26/2022     Priority: Medium    Symptomatic anemia, microcytic, acute [D64.9] 12/20/2021     Past Medical History:   Diagnosis Date    Adenocarcinoma in a polyp ([address]     Alcoholic cirrhosis of liver with ascites ([facility]     Anemia 04/13/2022    Anxiety     Arthritis     Back pain, chronic     dr. Shellie Borjas, orthopedic, every 3-4 months, gets steroid injection    Lezama esophagus     Bleeding gastric varices 12/29/2022    BPH (benign prostatic hypertrophy)     Cholelithiasis     Cirrhosis ([address]     COVID-19 12/2020    pt reports he had a positive test while at [facility] in 2020, was asymptomatic    COVID-19 vaccine series completed 5/20/2021, 6/22/2021    Moderna 5/20/2021, 6/22/2021    DDD (degenerative disc disease), lumbar     Depression     Esophageal cancer ([address]     INVASIVE ADENOCARCINOMA ARISING IN TUBULAR ADENOMA WITH HIGH GRADE DYSPLASIA, ASSOCIATED WITH FOCAL INTESTINAL METAPLASIA     Esophageal varices ([address]     Fatty liver     GERD (gastroesophageal reflux disease)     GI bleed     Hep C w/o coma, chronic ([address]     History of alcohol abuse     6-12 beers a day; quit drinking 2019    History of blood transfusion     History of colon polyps 2016    History of tobacco abuse     [place] (hard of hearing)     Hyperlipidemia     Hypertension     Hyponatremia 07/20/2016    Hypotension 12/20/2021    Mastoid disorder, bilateral 12/31/2022    biLateral mastoid effusion    PONV (postoperative nausea and vomiting)     Port-A-Cath in place     right upper chest    Portal hypertension (HCC)     Sciatica     Secondary esophageal varices ([address] 06/07/2022    Shortness of breath     Spinal stenosis     Stomach ulcer     hx of    Thrombocytopenia ([address] 12/23/2020    Tubular adenoma of colon 2016, 2018    Vitamin D deficiency     Wears glasses         Plan:        Anemia with melena s/p EGD as above  PPI BID  Cut down Octreotide to 25 mcg/hr, d/c in am  Decompensated alcoholic cirrhosis s/p TIPS w revision 12/30 upsizing of stent, embolization L gastric vein  US liver - patent TIPS  Soft, 2 gm sodium diet  Diuretics as ordered  Avoid sedatives narcotics  Alcohol abstinence  Likely d/c tomorrow    Time spent reviewing the chart, seeing the patient, and discussing with the attending MD around 30 minutes.     Explained to the patient and d/W Nursing Staff  Will F/U with you  Please call or Page for any issues or change in status  Thanks    Electronically signed by MASSIMO Vieira NP on 1/27/2023 at 11:32 AM       GI attending physician note. Patient seen with MASSIMO     I independently performed an evaluation on Va Garza. I have reviewed the above documentation completed by the Nurse Practitioner and agree with the history, examination, and management plan. Recommendations discussed. Patient looks stable. No signs of bleeding. Hemoglobin has been stable. Recommendations:    2 g sodium diet. If stable may be discharged tomorrow. To decrease and stop octreotide. If he is discharged, advised to see me in the next 7 to 10 days. CBC in the next 1 week.

## 2023-01-27 NOTE — PROGRESS NOTES
History and Physical Service  [facility]    Progress note            Date:   1/27/2023  Patient name:  Stevie Estrella  MRN:   [id number]  Account:  [de-identified]  YOB: 1960  PCP:    VASU Sy  Code Status:    Full Code    Chief Complaint:     Chief Complaint   Patient presents with    Abnormal Lab     Pt reports a hemoglobin of 6.0         History Obtained From:     Patient, EMR, nursing staff    HPI     This patient is a 60 y.o. Non- / non  malewho presents with    According to patient, he had outpatient lab work completed today and received a call from his PCP instructing him to come to the ED for hemoglobin 6.4. Repeat hemoglobin obtained in ED 5.7. Patient reports that he had a TIPS procedure completed in November for treatment of cirrhosis and states that he was hospitalized for a week in December due to bleeding from his TIPS. Symptoms are associated with shortness of breath with activity, which has been present since having pneumonia. Denies fever, chills, chest pain, cough, abdominal pain, nausea, vomiting, diarrhea, and urinary symptoms. Denies hematochezia, melena, hematuria, and all other obvious signs of bleeding. Declined REBECCA in ED. States that stools are brown as usual.  There are no aggravating or alleviating factors. Symptoms are reported as Constant and moderate    127 2023  Patient had EGD yesterday  It showed evidence of hiatus hernia  Had telangiectasia like lesions in lower esophagus and in the fundus and cardia of stomach  There was oozing from these areas where the telangiectasia are present    Review of Systems:   Positive for generalized fatigue  Denies any shortness of breath or cough  Denies chest pain or palpitations  Denies abdominal pain, diarrhea vomiting  Denies any new numbness tremors or weakness.     A 10 point review of systems was performed and and negative except as mentioned in HPI  Positive and Negative as described in HPI.       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 Rafael Victor, orthopedic, every 3-4 months, gets steroid injection    Lezama esophagus     Bleeding gastric varices 12/29/2022    BPH (benign prostatic hypertrophy)     Cholelithiasis     Cirrhosis ([address]     COVID-19 12/2020    pt reports he had a positive test while at [facility] in 2020, was asymptomatic    COVID-19 vaccine series completed 5/20/2021, 6/22/2021    Moderna 5/20/2021, 6/22/2021    DDD (degenerative disc disease), lumbar     Depression     Esophageal cancer ([address]     INVASIVE ADENOCARCINOMA ARISING IN TUBULAR ADENOMA WITH HIGH GRADE DYSPLASIA, ASSOCIATED WITH FOCAL INTESTINAL METAPLASIA     Esophageal varices ([address]     Fatty liver     GERD (gastroesophageal reflux disease)     GI bleed     Hep C w/o coma, chronic ([address]     History of alcohol abuse     6-12 beers a day; quit drinking 2019    History of blood transfusion     History of colon polyps 2016    History of tobacco abuse     [place] (hard of hearing)     Hyperlipidemia     Hypertension     Hyponatremia 07/20/2016    Hypotension 12/20/2021    Mastoid disorder, bilateral 12/31/2022    biLateral mastoid effusion    PONV (postoperative nausea and vomiting)     Port-A-Cath in place     right upper chest    Portal hypertension ([address]     Sciatica     Secondary esophageal varices ([address] 06/07/2022    Shortness of breath     Spinal stenosis     Stomach ulcer     hx of    Thrombocytopenia ([address] 12/23/2020    Tubular adenoma of colon 2016, 2018    Vitamin D deficiency     Wears glasses         Past Surgical History:     Past Surgical History:   Procedure Laterality Date    BUNIONECTOMY      twice on right side    BUNIONECTOMY Left     CARPAL TUNNEL RELEASE Right     COLONOSCOPY      at age 35 COLONOSCOPY  10/05/2016    polyps-pathology tubular adenoma, and abnormal looking mucosa right colon-pathology-tubular adenoma    COLONOSCOPY N/A 03/30/2018    COLONOSCOPY POLYPECTOMY COLD BIOPSY performed by Dahlia Hawkins MD at [address] [place] [address]  03/30/2018    Small polyp in the sigmoid colon and excised with biopsy forceps--tubular adenoma    COLONOSCOPY N/A 04/16/2022    COLONOSCOPY POLYPECTOMY performed by Dahlia Hawkins MD at [facility], DIAGNOSTIC      EGD    ESOPHAGOGASTRODUODENOSCOPY  12/29/2022    ESOPHAGOGASTRODUODENOSCOPY N/A 01/03/2023    IR PORT PLACEMENT EQUAL OR GREATER THAN 5 YEARS  04/19/2021    IR PORT PLACEMENT EQUAL OR GREATER THAN 5 YEARS 4/19/2021 [facility]    IR TIPS INSERTION  12/01/2022    IR TIPS INSERTION 12/1/2022 Penny Ernst MD [facility]    KNEE SURGERY Left     cyst removed    NASAL SEPTUM SURGERY      NERVE BLOCK Right 11/23/2020    NERVE BLOCK RIGHT CERVICAL STEROID INJECTION  C3-C6 performed by Ira Guevara MD at [address] [facility]  01/04/2016    steroid injection C7 T1    OTHER SURGICAL HISTORY  11/21/2016    Bilateral Lumbar CACHORRO L4-L5 injections    OTHER SURGICAL HISTORY  12/19/2016    lumbar steroid injection    OTHER SURGICAL HISTORY  09/28/2018    BILATERAL L5 CACHORRO (N/A Back)    OTHER SURGICAL HISTORY Right 11/23/2020    cervical injection    PAIN MANAGEMENT PROCEDURE Left 07/09/2020    EPIDURAL STEROID INJECTION LEFT L4 L5 performed by Ira Guevara MD at [facility] Left 07/20/2020    LEFT L4 L5 EPIDURAL STEROID INJECTION performed by Ira Guevara MD at [facility] Bilateral 08/17/2020    LUMBAR FACET BILATERAL L2-L5 performed by Ira Guevara MD at [facility] Bilateral 12/07/2020    NERVE BLOCK BILATERAL LUMBAR MEDIAL BRANCH L2-L5 performed by Ira Guevara MD at [address] [facility] NEUROPLASTY &/TRANSPOS MEDIAN NRV CARPAL TUNNE Right 08/29/2017    CARPAL TUNNEL RELEASE RIGHT performed by Yazmin Perez MD at [address] &/TRANSPOS MEDIAN NRV CARPAL TUNNE Left 10/31/2017    CARPAL TUNNEL RELEASE performed by Yazmin Perez MD at [facility] [address] AA&/STRD TFRML EPI LUMBAR/SACRAL 1 LEVEL Bilateral 09/06/2018    BILATERAL L5 CACHORRO performed by Rolo Ibrahim MD at [place] [id number] AA&/STRD TFRML EPI LUMBAR/SACRAL 1 LEVEL N/A 09/28/2018    BILATERAL L5 CACHORRO performed by Rolo Ibrahim MD at [address] [place] 12/29/2020    EGD BIOPSY performed by Jeanetta Pallas, MD at [address] [place] [address] 02/02/2021    EGD BIOPSY and spot marking performed by Ivan Weldon MD at [address] [place] [address] 02/12/2021    ENDOSCOPIC ULTRASOUND, EGD performed by Corinna Dumont MD at [place] [address]  02/12/2021    EGD DIAGNOSTIC ONLY performed by Corinna Dumont MD at [place] [address] N/A 08/31/2021    EGD BIOPSY performed by Ivan Weldon MD at [address] [place] [address] 01/21/2022    EGD BIOPSY performed by Ivan Weldon MD at [address] [place] [address] N/A 04/15/2022    EGD ESOPHAGOGASTRODUODENOSCOPY performed by Jeanetta Pallas, MD at [address] [place] 06/06/2022    EGD BAND LIGATION performed by Olivier Worley MD at [address] [place] [address] N/A 06/09/2022    EGD ESOPHAGOGASTRODUODENOSCOPY performed by Olivier Worley MD at [address] [place] [address] N/A 09/14/2022    EGD ESOPHAGOGASTRODUODENOSCOPY ENDOSCOPIC APC AT GE JUNCTION performed by Manish Card MD at [address] [place] [address] 10/19/2022    EGD BIOPSY performed by Ivan Weldon MD at [address] [place] [address] 10/31/2022    EGD with APC performed by Urvashi Thomas MD at [address]  12/29/2022    EGD ESOPHAGOGASTRODUODENOSCOPY performed by Sundar Brown MD at [place] [address]. 1/3/2023    EGD ESOPHAGOGASTRODUODENOSCOPY performed by Keyla Torres MD at [place] [address]. 1/26/2023    EGD CONTROL HEMORRHAGE performed by Urvashi Thomas MD at [address] [place] [address]          Medications Prior to Admission:     Prior to Admission medications    Medication Sig Start Date End Date Taking? Authorizing Provider   lactulose (CHRONULAC) 10 GM/15ML solution take 30 milliliters by mouth three times a day 1/17/23   VASU Montero   furosemide (LASIX) 40 MG tablet Take 1 tablet by mouth in the morning and 1 tablet in the evening. 1/11/23   Shira Grimes,    spironolactone (ALDACTONE) 25 MG tablet Take 1 tablet by mouth daily 1/11/23   Shira Grimes, DO   pantoprazole (PROTONIX) 40 MG tablet Take 40 mg by mouth daily 10/4/22   Historical Provider, MD   FEROSUL 325 (65 Fe) MG tablet take 1 tablet by mouth twice a day 10/7/22   VASU Montero   atorvastatin (LIPITOR) 20 MG tablet Take 1 tablet by mouth nightly 4/21/22   Dorcas Cassidy MD        Allergies:     Patient has no known allergies. Social History:     Tobacco:    reports that he quit smoking about 6 years ago. His smoking use included cigarettes. He has a 45.00 pack-year smoking history. He has never used smokeless tobacco.  Alcohol:      reports that he does not currently use alcohol. Drug Use:  reports that he does not currently use drugs after having used the following drugs: Cocaine. Frequency: 1.00 time per week.     Family History:     Family History   Problem Relation Age of Onset    Cancer Mother         pancreatic    Cancer Father         bone    Diabetes Sister     Asthma Brother     Allergies Brother         MULTIPLE           Physical Exam:   /62   Pulse 82   Temp 98 °F (36.7 °C)   Resp 16   Ht 5' 10\" (1.778 m)   Wt 192 lb 14.4 oz (87.5 kg)   SpO2 96%   BMI 27.68 kg/m²   No results for input(s): POCGLU in the last 72 hours. Vitals:    01/26/23 1845 01/27/23 0015 01/27/23 0600 01/27/23 0615   BP: 119/66 115/63  120/62   Pulse: 87 88  82   Resp: 18 16  16   Temp: 98.4 °F (36.9 °C) 98 °F (36.7 °C)  98 °F (36.7 °C)   TempSrc: Oral      SpO2: 96% 96%  96%   Weight:   192 lb 14.4 oz (87.5 kg)    Height:           General Appearance:  alert, well appearing, and in no acute distress  Mental status: oriented to person, place, and time with normal affect  Head:  normocephalic, atraumatic. Eye: no icterus, redness, pupils equal and reactive, extraocular eye movements intact, conjunctiva clear  Ear: normal external ear, no discharge, hearing intact  Nose:  no drainage noted  Mouth: mucous membranes moist  Neck: supple, no carotid bruits, thyroid not palpable  Lungs: Bilateral equal air entry, clear to ausculation, no wheezing, rales or rhonchi, normal effort  Cardiovascular: normal rate, regular rhythm, no murmur, gallop, rub.   Abdomen: Soft, nontender, ascitic distention present   Neurologic: There are no new focal motor or sensory deficits, normal muscle tone and bulk, no abnormal sensation, normal speech, cranial nerves II through XII grossly intact  Skin: No gross lesions, rashes, bruising or bleeding on exposed skin area  Extremities:  peripheral pulses palpable, grade 1 bilateral pedal edema no calf pain with palpation  Psych: normal affect     Investigations:      Laboratory Testing:  Recent Results (from the past 24 hour(s))   Occult Blood Screen    Collection Time: 01/26/23 11:57 AM   Result Value Ref Range    Occult Blood, Stool #1 POSITIVE (A) NEGATIVE    Date, Stool #1 [phone]     Time, Stool #1 Unknown    Hemoglobin and Hematocrit    Collection Time: 01/26/23  7:39 PM   Result Value Ref Range    Hemoglobin 8.0 (L) 13.5 - 17.5 g/dL    Hematocrit 25.4 (L) 41 - 53 %   Hemoglobin and Hematocrit    Collection Time: 01/27/23  1:54 AM   Result Value Ref Range    Hemoglobin 8.4 (L) 13.5 - 17.5 g/dL    Hematocrit 27.2 (L) 41 - 53 %   Basic Metabolic Panel w/ Reflex to MG    Collection Time: 01/27/23  7:49 AM   Result Value Ref Range    Glucose 137 (H) 70 - 99 mg/dL    BUN 10 8 - 23 mg/dL    Creatinine 0.67 (L) 0.70 - 1.20 mg/dL    Est, Glom Filt Rate >60 >60 mL/min/1.73m2    Calcium 8.0 (L) 8.6 - 10.4 mg/dL    Sodium 131 (L) 135 - 144 mmol/L    Potassium 3.9 3.7 - 5.3 mmol/L    Chloride 99 98 - 107 mmol/L    CO2 26 20 - 31 mmol/L    Anion Gap 6 (L) 9 - 17 mmol/L   CBC    Collection Time: 01/27/23  7:49 AM   Result Value Ref Range    WBC 5.9 3.5 - 11.0 k/uL    RBC 2.99 (L) 4.5 - 5.9 m/uL    Hemoglobin 8.1 (L) 13.5 - 17.5 g/dL    Hematocrit 25.2 (L) 41 - 53 %    MCV 84.3 80 - 100 fL    MCH 27.1 26 - 34 pg    MCHC 32.1 31 - 37 g/dL    RDW 19.4 (H) 11.5 - 14.9 %    Platelets 99 (L) 592 - 450 k/uL    MPV 7.0 6.0 - 12.0 fL       Recent Labs     01/27/23  0749 01/25/23  0521 01/24/23  2200 01/02/23  1648 01/02/23  1055   HGB 8.1*   < > 5.7*   < >  --    HCT 25.2*   < > 17.8*   < >  --    WBC 5.9   < > 4.8   < >  --    MCV 84.3   < > 81.4   < >  --    *   < > 131*   < >  --    K 3.9   < > 4.2   < >  --    CL 99   < > 102   < >  --    CO2 26   < > 23   < >  --    BUN 10   < > 13   < >  --    CREATININE 0.67*   < > 0.53*   < >  --    GLUCOSE 137*   < > 112*   < >  --    INR  --   --  1.4   < > 1.4   PROTIME  --   --  17.0*   < > 14.7*   APTT  --   --   --   --  35.9*   AST  --   --  69*   < >  --    ALT  --   --  25   < >  --    LABALBU  --   --  2.4*   < >  --     < > = values in this interval not displayed.          Hematology:  Recent Labs     01/24/23  2200 01/25/23  0521 01/25/23  1401 01/26/23  0530 01/26/23  1939 01/27/23  0154 01/27/23  0749   WBC 4.8 3.9  --  5.3  --   --  5.9   RBC 2.19* 2.62*  --  2.82*  --   --  2.99*   HGB 5.7* 6.9*   < > 7. 6* 8.0* 8.4* 8.1*   HCT 17.8* 22.4*   < > 23.8* 25.4* 27.2* 25.2*   MCV 81.4 85.4  --  84.4  --   --  84.3   MCH 26.0 26.1  --  27.1  --   --  27.1   MCHC 32.0 30.6*  --  32.1  --   --  32.1   RDW 21.7* 20.3*  --  19.0*  --   --  19.4*   * 106*  --  96*  --   --  99*   MPV 7.2 7.2  --  7.6  --   --  7.0   INR 1.4  --   --   --   --   --   --     < > = values in this interval not displayed. Chemistry:  Recent Labs     01/24/23 2200 01/25/23  0521 01/26/23  0530 01/27/23  0749   * 132* 132* 131*   K 4.2 4.1 3.8 3.9    103 101 99   CO2 23 20 23 26   GLUCOSE 112* 94 92 137*   BUN 13 12 14 10   CREATININE 0.53* 0.48* 0.70 0.67*   MG 1.7  --   --   --    ANIONGAP 6* 9 8* 6*   LABGLOM >60 >60 >60 >60   CALCIUM 7.7* 7.4* 7.9* 8.0*       Recent Labs     01/24/23 2200 01/24/23  2240   PROT 5.0*  --    LABALBU 2.4*  --    AST 69*  --    ALT 25  --    ALKPHOS 197*  --    BILITOT 1.5*  --    BILIDIR 0.9*  --    AMMONIA  --  34   LIPASE 34  --          Imaging/Diagnostics:       XR CHEST (SINGLE VIEW FRONTAL)    Result Date: 12/27/2022  EXAMINATION: ONE XRAY VIEW OF THE CHEST 12/27/2022 5:26 pm COMPARISON: Chest radiographs on 12/09/2022 HISTORY: ORDERING SYSTEM PROVIDED HISTORY: eval for effusion TECHNOLOGIST PROVIDED HISTORY: eval for effusion Reason for Exam: eval for effusion FINDINGS: A right IJ port catheter tip is in the superior vena cava. There is mild bibasilar atelectasis with trace bilateral pleural fluid. No pneumothorax is identified. There is calcification at the aortic arch. 1. Minimal bibasilar atelectatic changes with trace left pleural fluid which is slightly decreased from 12/09/2022. XR RADIUS ULNA RIGHT (2 VIEWS)    Result Date: 1/1/2023  EXAMINATION: TWO XRAY VIEWS OF THE RIGHT FOREARM 1/1/2023 5:50 pm COMPARISON: None.  HISTORY: ORDERING SYSTEM PROVIDED HISTORY: swelling R forearm TECHNOLOGIST PROVIDED HISTORY: swelling R forearm FINDINGS: No fracture, dislocation, or focal osseous lesion is noted. Mild volar soft tissue swelling. No fracture or dislocation. Mild soft tissue swelling     CT HEAD WO CONTRAST    Result Date: 12/31/2022  EXAMINATION: CT OF THE HEAD WITHOUT CONTRAST; CT OF THE CERVICAL SPINE WITHOUT CONTRAST 12/31/2022 1:58 am TECHNIQUE: CT of the head was performed without the administration of intravenous contrast. Automated exposure control, iterative reconstruction, and/or weight based adjustment of the mA/kV was utilized to reduce the radiation dose to as low as reasonably achievable.; CT of the cervical spine was performed without the administration of intravenous contrast. Multiplanar reformatted images are provided for review. Automated exposure control, iterative reconstruction, and/or weight based adjustment of the mA/kV was utilized to reduce the radiation dose to as low as reasonably achievable. COMPARISON: 12/07/2021 MRI cervical spine. 09/30/2020 MRI brain. HISTORY: ORDERING SYSTEM PROVIDED HISTORY: reduced mentation post fall R/O intracranial bleed TECHNOLOGIST PROVIDED HISTORY: reduced mentation post fall R/O intracranial bleed; ORDERING SYSTEM PROVIDED HISTORY: unwitnessed ground level fall. TECHNOLOGIST PROVIDED HISTORY: unwitnessed ground level fall. FINDINGS: CT BRAIN: BRAIN/VENTRICLES: There is no acute intracranial hemorrhage, mass effect or midline shift. No abnormal extra-axial fluid collection. Involutional parenchymal changes. Age-indeterminate (likely chronic) left basal ganglia lacunar infarct. No large territorial hypodensity. There is no evidence of hydrocephalus. Stable 0.6 cm peripherally calcified cystic pineal lesion, likely an incidental pineal cyst. ORBITS: The visualized portion of the orbits demonstrate no acute abnormality. SINUSES: The visualized paranasal sinuses and mastoid air cells demonstrate no acute abnormality. SOFT TISSUES/SKULL:  No acute abnormality of the visualized skull or soft tissues.  CT CERVICAL SPINE: BONES/ALIGNMENT: There is no acute fracture or traumatic malalignment. DEGENERATIVE CHANGES: Multilevel cervical spondylosis, most notable at C5-C6 and C6-C7. No high-grade bony spinal canal stenosis. SOFT TISSUES: There is no prevertebral soft tissue swelling. Partially visualized right IJ approach chest port. CT BRAIN: 1. No evidence of acute hemorrhage, large territorial hypodensity, significant mass effect/midline shift, or ventriculomegaly 2. Involutional parenchymal changes. 3. Age-indeterminate (likely chronic) left basal ganglia lacunar infarct. If clinically indicated, can consider further evaluation with MRI if no contraindications. CT CERVICAL SPINE: 1. No acute abnormality of the cervical spine. 2. Multilevel cervical spondylosis, most notable at C5-C6 and C6-C7. No high-grade bony spinal canal stenosis. CT CERVICAL SPINE WO CONTRAST    Result Date: 12/31/2022  EXAMINATION: CT OF THE HEAD WITHOUT CONTRAST; CT OF THE CERVICAL SPINE WITHOUT CONTRAST 12/31/2022 1:58 am TECHNIQUE: CT of the head was performed without the administration of intravenous contrast. Automated exposure control, iterative reconstruction, and/or weight based adjustment of the mA/kV was utilized to reduce the radiation dose to as low as reasonably achievable.; CT of the cervical spine was performed without the administration of intravenous contrast. Multiplanar reformatted images are provided for review. Automated exposure control, iterative reconstruction, and/or weight based adjustment of the mA/kV was utilized to reduce the radiation dose to as low as reasonably achievable. COMPARISON: 12/07/2021 MRI cervical spine. 09/30/2020 MRI brain. HISTORY: ORDERING SYSTEM PROVIDED HISTORY: reduced mentation post fall R/O intracranial bleed TECHNOLOGIST PROVIDED HISTORY: reduced mentation post fall R/O intracranial bleed; ORDERING SYSTEM PROVIDED HISTORY: unwitnessed ground level fall.  TECHNOLOGIST PROVIDED HISTORY: unwitnessed ground level fall. FINDINGS: CT BRAIN: BRAIN/VENTRICLES: There is no acute intracranial hemorrhage, mass effect or midline shift. No abnormal extra-axial fluid collection. Involutional parenchymal changes. Age-indeterminate (likely chronic) left basal ganglia lacunar infarct. No large territorial hypodensity. There is no evidence of hydrocephalus. Stable 0.6 cm peripherally calcified cystic pineal lesion, likely an incidental pineal cyst. ORBITS: The visualized portion of the orbits demonstrate no acute abnormality. SINUSES: The visualized paranasal sinuses and mastoid air cells demonstrate no acute abnormality. SOFT TISSUES/SKULL:  No acute abnormality of the visualized skull or soft tissues. CT CERVICAL SPINE: BONES/ALIGNMENT: There is no acute fracture or traumatic malalignment. DEGENERATIVE CHANGES: Multilevel cervical spondylosis, most notable at C5-C6 and C6-C7. No high-grade bony spinal canal stenosis. SOFT TISSUES: There is no prevertebral soft tissue swelling. Partially visualized right IJ approach chest port. CT BRAIN: 1. No evidence of acute hemorrhage, large territorial hypodensity, significant mass effect/midline shift, or ventriculomegaly 2. Involutional parenchymal changes. 3. Age-indeterminate (likely chronic) left basal ganglia lacunar infarct. If clinically indicated, can consider further evaluation with MRI if no contraindications. CT CERVICAL SPINE: 1. No acute abnormality of the cervical spine. 2. Multilevel cervical spondylosis, most notable at C5-C6 and C6-C7. No high-grade bony spinal canal stenosis. CT ABDOMEN PELVIS W IV CONTRAST Additional Contrast? Radiologist Recommendation    Result Date: 1/3/2023  EXAMINATION: CT OF THE ABDOMEN AND PELVIS WITH CONTRAST 1/3/2023 1:58 am TECHNIQUE: CT of the abdomen and pelvis was performed with the administration of intravenous contrast. Multiplanar reformatted images are provided for review.  Automated exposure control, iterative reconstruction, and/or weight based adjustment of the mA/kV was utilized to reduce the radiation dose to as low as reasonably achievable. COMPARISON: 12/27/2022 HISTORY: ORDERING SYSTEM PROVIDED HISTORY: rectal bleeding TECHNOLOGIST PROVIDED HISTORY: rectal bleeding Reason for Exam: rectal bleeding FINDINGS: Lower Chest: Small to moderate bilateral pleural effusions. Opacification along the posterior margin of the lower lobes suggesting compressive atelectasis. There is a small to moderate the effusions were present on the prior study as well. Mild thickening of the distal esophagus. Pericardial effusion as well. Organs: Cirrhotic liver with nodular margin. A tips shunt is present. The contrast timing is not dedicated for portal venous phase but the shunt appears to be patent. Gallbladder does contain multiple calcified gallstones and is mildly distended. There is ascites around the margin of the liver and in the upper abdomen degrading evaluation for the local inflammation. Spleen is stable. No pancreatic ductal dilatation and the pancreatic enhancement is normal.  Adrenal glands are not enlarged. Kidneys are enhancing equally without hydronephrosis or hydroureter. GI/Bowel: Mild thickening the gastric mucosal folds and into the distal esophagus. There are fluid distended loops of small bowel in the mid and lower abdomen. Edema of the wall of the small bowel loops in the right lower quadrant with the surrounding ascites. Fluid is also present in the colon and rectum. There is no significant solid fecal load. There is a short segment of very collapsed rectum near the rectosigmoid junction which is worse than the recent exam and may represent spasm rather than underlying mass. Scattered ascites in the abdomen but no pneumoperitoneum. Pelvis: Urinary bladder is collapsed around a Neal catheter and the wall is not evaluated. The prostate is not enlarged.  Peritoneum/Retroperitoneum: Atherosclerosis of the aorta and branches but no abdominal aortic aneurysm. Peripherally calcified structure in the left upper quadrant could be a thrombosed splenic artery aneurysm measuring up to 3.1 cm but unchanged from prior exams. . Bones/Soft Tissues: Diffuse degenerative changes in the spine. Stable appearance of T12. body wall edema. 1.  There is a short segment of narrowing of the rectum near the rectosigmoid junction compared to the recent exam and may represent spasm rather than underlying mass. Fluid-filled loops of small bowel and colon suggesting diarrhea with enteritis or colitis. Evaluation of true wall thickening is limited by the diffuse edematous state. 2.  Cirrhotic liver with tips, ascites, and sequela of portal hypertension. 3.  Thickening of the gastric mucosal folds and into the distal esophagus. Correlate with prior history of malignancy. 4.  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 5.  Pleural effusions and pericardial effusion are redemonstrated. The opacified portions of the lower lungs appears to be from compressive atelectasis. CT ABDOMEN PELVIS W IV CONTRAST Additional Contrast? None    Result Date: 12/27/2022  EXAMINATION: CT OF THE ABDOMEN AND PELVIS WITH CONTRAST 12/27/2022 3:31 pm TECHNIQUE: CT of the abdomen and pelvis was performed with the administration of intravenous contrast. Multiplanar reformatted images are provided for review. Automated exposure control, iterative reconstruction, and/or weight based adjustment of the mA/kV was utilized to reduce the radiation dose to as low as reasonably achievable.  COMPARISON: 11/03/2022 HISTORY: ORDERING SYSTEM PROVIDED HISTORY: eval shunt from recent TIPS TECHNOLOGIST PROVIDED HISTORY: eval shunt from recent TIPS Decision Support Exception - unselect if not a suspected or confirmed emergency medical condition->Emergency Medical Condition (MA) Reason for Exam: light headed, fatigue, nausea since TIPS surgery 12/1/22 FINDINGS: Lower Chest: Bibasal infiltrates. Moderate bilateral pleural effusions. Pericardial effusion. Circumferential wall thickening of the distal esophagus and gastroesophageal junction consistent with known esophageal malignancy. No significant change since prior examination. No esophageal obstruction. Abdomen/Pelvis: Organs: There is nodular contour of the liver with enlargement of the caudate and left hepatic lobes consistent with hepatic cirrhosis. No definite focal hepatic mass. Tips in place and appears patent. Cholelithiasis without evidence of acute cholecystitis. The pancreas demonstrates no acute abnormality. There is splenomegaly consistent with portal venous hypertension. Stable  stigmata of portal venous hypertension including esophageal varices and upper abdominal varices. No focal adrenal nodules. The kidneys enhance symmetrically without evidence of hydronephrosis. GI/Bowel: No evidence of abnormal bowel wall thickening or distention. Pelvis: The bladder is unremarkable. No pelvic mass or lymphadenopathy. Peritoneum/Retroperitoneum: There is progressive abdominal and pelvic ascites . No evidence of pneumoperitoneum. 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 No retroperitoneal lymphadenopathy. No evidence of gastrohepatic or periportal lymphadenopathy. Bones/Soft Tissues:   Age related degenerative changes of the visualized osseous structures without focal destructive lesion. Stable circumferential wall thickening of the distal esophagus and gastroesophageal junction consistent with known esophageal malignancy. No significant change since prior examination. No esophageal obstruction. Cirrhotic liver with associated portal venous hypertension, progressive ascites and stable splenomegaly. TIPS appears in place and patent Cholelithiasis Interval development of bibasal infiltrates and moderate bilateral pleural effusions as well as mild-to-moderate pericardial effusion     IR FLUORO GUIDED NEEDLE PLACEMENT    Result Date: 12/28/2022  PROCEDURE: Ultrasound-guided midline venous catheter placement 12/28/2022 TECHNIQUE: Sonography was utilized HISTORY: ORDERING SYSTEM PROVIDED HISTORY: midline TECHNOLOGIST PROVIDED HISTORY: midline Suspected GI bleed requiring multiple IV medications/fluid. CONTRAST: None SEDATION: None FLUOROSCOPY TIME: None DESCRIPTION OF PROCEDURE: Informed consent was obtained after a detailed explanation of the procedure including risks, benefits, and alternatives. Universal protocol was observed. The procedure was performed by the interventional radiology nurse under my supervision. Using ultrasound guidance, after anesthetizing the skin one percent lidocaine, a micropuncture needle was advanced into the patent right brachial vein. An ultrasound image demonstrating patency of the vein was stored in PACs. Appropriate blood return was obtained and an a micro wire was inserted. The needle was removed and a peel-away sheath was advanced over the wire to allow placement of a 15 cm midline venous catheter. This was secured and dressed appropriately. The catheter aspirated and flushed without resistance. The patient tolerated the procedure well and left the Department stable condition. Dr. Destiny Joya was present. Successful ultrasound-guided placement of a right upper extremity midline venous catheter. US LIVER    Result Date: 12/28/2022  EXAMINATION: RIGHT UPPER QUADRANT ULTRASOUND 12/28/2022 4:16 pm COMPARISON: CT abdomen 12/27/2022 HISTORY: ORDERING SYSTEM PROVIDED HISTORY: check patency of TIPS FINDINGS: LIVER:  The liver demonstrates heterogeneous, multinodular pattern echogenicity without dominant hepatic lesion demonstrated sonographically.  No evidence of intrahepatic biliary ductal dilatation. TIPS: Duplex and Doppler waveform imaging of the tips was performed with the following flow velocities: Distal 30.6 cm/second, mid 29.1 cm/second, proximal 33.6 cm/second. BILIARY SYSTEM:  Gallbladder demonstrates a number of layering, echogenic shadowing stones. No wall thickening evident. Negative sonographic Silva's sign. Common bile duct is within normal limits measuring 3 mm. RIGHT KIDNEY: The right kidney is grossly unremarkable without evidence of hydronephrosis. Right kidney long dimension 11.2 cm. PANCREAS:  Visualized portions of the pancreas are unremarkable. OTHER: Small volume right upper quadrant ascites. 1.  Cholelithiasis without definite findings of acute cholecystitis. 2. Patent TIPS. Specific velocities recorded above. 3. Small volume abdominal ascites. 4. Hepatic morphologic features typical of cirrhosis. US GALLBLADDER RUQ    Result Date: 12/30/2022  EXAMINATION: RIGHT UPPER QUADRANT ULTRASOUND 12/30/2022 9:26 am COMPARISON: CT scan of the abdomen and pelvis from 12/27/2022 HISTORY: ORDERING SYSTEM PROVIDED HISTORY: Gall stones, inc SIRS. R/O Cholecystitis TECHNOLOGIST PROVIDED HISTORY: Gall stones, inc SIRS. R/O Cholecystitis FINDINGS: LIVER:  The liver demonstrates irregular contour and heterogeneous echogenicity compatible with known cirrhosis; intrahepatic biliary tree appears nondilated. Functioning tips stent identified (proximal, mid and distal velocities 54.1, 26.6, 25.8 cm/sec). BILIARY SYSTEM:  Gallbladder contains multiple known echogenic stones without wall thickening, or obvious wall edema. Negative sonographic Silva's sign. Common bile duct is within normal limits measuring 2.2 mm. RIGHT KIDNEY: The right kidney is grossly unremarkable without evidence of hydronephrosis. Right kidney measures 11.3 x 5.4 x 6.2 cm; cortical thickness 1.5 cm. PANCREAS:  Visualized portions of the pancreas are unremarkable. OTHER: Moderate right-sided ascites. Small-Moderate right pleural effusion. Multiple cholelithiasis but no ultrasound evidence cholecystitis. Nondilated biliary tree. Cirrhosis. Patent TIPS. Ascites. Additional findings, as above. XR CHEST PORTABLE    Result Date: 1/24/2023  EXAMINATION: ONE XRAY VIEW OF THE CHEST 1/24/2023 8:24 pm COMPARISON: 01/07/2023 CT chest and 01/03/2023 chest radiograph HISTORY: ORDERING SYSTEM PROVIDED HISTORY: cough TECHNOLOGIST PROVIDED HISTORY: cough Reason for Exam: cough FINDINGS: Right chest port catheter tip overlies the right atrium. The cardiomediastinal silhouette is enlarged, unchanged. Small left and trace right pleural effusions. Bibasilar and left midlung opacities are favored to represent edema and/or atelectasis. No pneumothorax. Osseous structures are unchanged. 1.  Small left and trace right pleural effusions. 2.  Opacities in the left perihilar region and bilateral lung bases may represent a combination of edema and atelectasis. Superimposed infectious process is not excluded. XR CHEST PORTABLE    Result Date: 1/3/2023  EXAMINATION: ONE XRAY VIEW OF THE CHEST 1/3/2023 7:22 am COMPARISON: 01/02/2023, 12/31/2022 HISTORY: ORDERING SYSTEM PROVIDED HISTORY: sepsis TECHNOLOGIST PROVIDED HISTORY: sepsis FINDINGS: Cardiac silhouette enlargement again noted. Right internal jugular port in place. Vascular congestion, basilar opacities and small pleural effusions appear increasing. No pneumothorax identified. Increasing vascular congestion with small pleural effusions, suggestive of developing edema. XR CHEST PORTABLE    Result Date: 1/2/2023  EXAMINATION: ONE XRAY VIEW OF THE CHEST 1/2/2023 9:39 am COMPARISON: Chest x-ray dated 31 December 2022 HISTORY: ORDERING SYSTEM PROVIDED HISTORY: Bilateral arm swelling TECHNOLOGIST PROVIDED HISTORY: Bilateral arm swelling FINDINGS: Right-sided chest port catheter with the tip at the SVC/atrial junction. Mild right basilar atelectasis. Stable cardiomediastinal silhouette. Mild pulmonary vascular congestion     Mild pulmonary vascular congestion without evidence of overt edema     XR CHEST PORTABLE    Result Date: 12/31/2022  EXAMINATION: ONE XRAY VIEW OF THE CHEST 12/31/2022 6:22 am COMPARISON: 30 December 2022 HISTORY: ORDERING SYSTEM PROVIDED HISTORY: Bilateral pneumonia TECHNOLOGIST PROVIDED HISTORY: Bilateral pneumonia FINDINGS: AP portable view of the chest time stamped at 615 hours demonstrates overlying cardiac monitoring electrodes. Fusion port enters from the right terminating in the superior vena cava. Stable cardiomegaly. There is no significant interval change in bilateral effusions left greater than right, vascular congestion, basilar atelectasis and other pulmonary opacities which may represents superimposed edema or airspace disease. Little change from prior study. Cardiomegaly, vascular congestion, effusions, atelectasis and possible superimposed acute airspace disease are again noted. XR CHEST PORTABLE    Result Date: 12/30/2022  EXAMINATION: ONE XRAY VIEW OF THE CHEST 12/30/2022 9:03 am COMPARISON: AP chest from 12/27/2022 HISTORY: ORDERING SYSTEM PROVIDED HISTORY: Worsening PNA TECHNOLOGIST PROVIDED HISTORY: Worsening PNA FINDINGS: Right IJ chemo port tip position stable. Overlying ECG monitor leads. Mildly enlarged cardiac silhouette. Mediastinal structures midline unchanged. Cephalization of blood flow and hazy airspace opacities mid lower zones greater retrocardiac space, compatible with bilateral pleural effusions, compressive atelectasis, and possibly infiltrate. No Kerley lines but mild central vascular congestion likely. Bones unchanged. Cardiomegaly with probable mild central vascular congestion and similar bilateral pleural effusions with compressive atelectasis; filtrate at either base a consideration. No pulmonary edema.      CT CHEST PULMONARY EMBOLISM W CONTRAST    Result Date: 1/7/2023  EXAMINATION: CTA OF THE CHEST 1/7/2023 10:07 pm TECHNIQUE: CTA of the chest was performed after the administration of intravenous contrast.  Multiplanar reformatted images are provided for review. MIP images are provided for review. Automated exposure control, iterative reconstruction, and/or weight based adjustment of the mA/kV was utilized to reduce the radiation dose to as low as reasonably achievable. COMPARISON: None. HISTORY: ORDERING SYSTEM PROVIDED HISTORY: SOB DVT TECHNOLOGIST PROVIDED HISTORY: SOB DVT Reason for Exam: SOB DVT FINDINGS: Pulmonary Arteries: Pulmonary arteries are adequately opacified for evaluation. No evidence of intraluminal filling defect to suggest pulmonary embolism. Main pulmonary artery is normal in caliber. Mediastinum: No evidence of mediastinal lymphadenopathy. The heart is normal size. Moderate pericardial effusion. .  There is no acute abnormality of the thoracic aorta. Lungs/pleura: Moderate bilateral pleural effusion is associated with atelectatic changes of right lower lobe and lingula and left lower lobe. . Focal consolidative change within the anterior aspect of right upper lobe peripheral aspect of left upper lobe and medial aspect of right middle lobe are likely suggestive of multifocal pneumonia. The lungs are otherwise without acute process. No pulmonary edema. No pneumothorax. Calcified granuloma within the lingula. Upper Abdomen: Cirrhotic liver with TIPS, ascites and portal hypertension . Calcified thrombosed splenic artery measuring 3.1 cm. .  Cholelithiasis with no signs of cholecystitis Soft Tissues/Bones: No acute bone or soft tissue abnormality. No evidence of pulmonary embolism. Moderate bilateral pleural effusion is associated with atelectatic changes of right lower lobe and lingula and left lower lobe. . Focal consolidative change within the anterior aspect of right upper lobe peripheral aspect of left upper lobe and medial aspect of right middle lobe are likely suggestive of multifocal pneumonia. Moderate pericardial effusion. Cirrhotic liver with TIPS, ascites and portal hypertension . Calcified thrombosed splenic artery measuring 3.1 cm. . RECOMMENDATIONS: Unavailable     VL DUP UPPER EXTREMITY VENOUS RIGHT    Result Date: 1/7/2023    [facility]  Vascular Upper Extremities Veins Procedure   Patient Name   Kristal Massey    Date of Study           01/07/2023                 ALHAJI [facility]SU   Date of Birth  1960  Gender                  Male   Age            60 year(s)  Race                       Room Number    6723        Height:                 70 inch, 177.8 cm   Corporate ID # [id number]    Weight:                 192 pounds, 87.1 kg   Patient Acct # [de-identified]   BSA:        2.05 m^2    BMI:      27.55 kg/m^2   MR #           [id number]     Sonographer             Jerry Contreras   Accession #    [id number]  Interpreting Physician  Semaj Posadas   Referring                  Referring Physician     Lizbeth Winston  Nurse  Practitioner  Procedure Type of Study:   Veins: Upper Extremities Veins, Venous Scan Upper Right. Indications for Study:R/O DVT and Arm swelling. Patient Status: In Patient. - Critical Result:Madie HSU RN at 5:01 PM.  Conclusions   Summary   Simultaneous real time imaging utilizing B-Mode, color doppler and  spectral waveform analysis was performed on the right upper extremity for  venous examination of the deep and superficial systems. Findings are:   Right:  Acute deep venous thrombosis identified in the brachial vein. Superficial venous thrombosis of the cephalic vein in the forearm.    Signature   ----------------------------------------------------------------  Electronically signed by Demetris CarpenterSonographer) on  01/07/2023 05:01 PM  ----------------------------------------------------------------   ----------------------------------------------------------------  Electronically signed by Chris Howe, Jennifer(Interpreting  physician) on 01/07/2023 07:54 PM  ----------------------------------------------------------------  Findings:   Right Impression: The brachial vein is partially compressible with hypoechoic echoes and  phasic color Doppler signals. The cephalic vein is non compressible with hypoechoic echoes with absent  color Doppler signals throughout the forearm. Internal jugular, subclavian, axillary, radial, proximal cephalic and  basilic veins are compressible with normal doppler responses. The ulnar vein was not visualized. Risk Factors History +---------+----+-----------------------------------------------------------+ ! Diagnosis! Date! Comments                                                   ! +---------+----+-----------------------------------------------------------+ ! Other    ! !Port-a-cath in place right chest.                          ! +---------+----+-----------------------------------------------------------+   - The patient's risk factor(s) include: dyslipidemia and arterial     hypertension.   - The patient has a former tobacco history. - The patient has esophageal cancer. Velocities are measured in cm/s ; Diameters are measured in cm Right UE Vein Measurements 2D Measurements +------------------------------------+----------+---------------+----------+ ! Location                            ! Visualized! Compressibility! Thrombosis! +------------------------------------+----------+---------------+----------+ ! Prox IJV                            ! Yes       ! Yes            ! None      ! +------------------------------------+----------+---------------+----------+ ! Dist IJV                            ! Yes       ! Yes            ! None      ! +------------------------------------+----------+---------------+----------+ ! Prox SCV                            ! Yes       ! !None      ! +------------------------------------+----------+---------------+----------+ ! Dist SCV !Yes       !               !None      ! +------------------------------------+----------+---------------+----------+ ! Prox Axillary                       ! Yes       ! Yes            ! None      ! +------------------------------------+----------+---------------+----------+ ! Dist Axillary                       ! Yes       ! Yes            ! None      ! +------------------------------------+----------+---------------+----------+ ! Prox Brachial                       !Yes       ! Partial        !AI        ! +------------------------------------+----------+---------------+----------+ ! Dist Brachial                       !Yes       ! Yes            ! None      ! +------------------------------------+----------+---------------+----------+ ! Prox Radial                         !Yes       ! Yes            ! None      ! +------------------------------------+----------+---------------+----------+ ! Dist Radial                         !Yes       ! Yes            ! None      ! +------------------------------------+----------+---------------+----------+ ! Prox Ulnar                          ! No        !               !          ! +------------------------------------+----------+---------------+----------+ ! Dist Ulnar                          ! No        !               !          ! +------------------------------------+----------+---------------+----------+ ! Basilic at UA                       ! Yes       ! Yes            ! None      ! +------------------------------------+----------+---------------+----------+ ! Basilic at AF                       ! Yes       ! Yes            ! None      ! +------------------------------------+----------+---------------+----------+ ! Basilic at [address]                       ! Yes       ! Yes            ! None      ! +------------------------------------+----------+---------------+----------+ ! Cephalic at UA                      ! Yes       ! Yes            ! None      ! +------------------------------------+----------+---------------+----------+ ! Cephalic at AF                      ! Yes       ! Yes            ! None      ! +------------------------------------+----------+---------------+----------+ ! Cephalic at [address]                      ! Yes       ! No             !AI        ! +------------------------------------+----------+---------------+----------+ Doppler Measurements +------------------------+-------------------------+-----------------------+ ! Location                ! Signal                   !Reflux                 ! +------------------------+-------------------------+-----------------------+ ! IJV                     ! Pulsatile                !                       ! +------------------------+-------------------------+-----------------------+ ! SCV                     ! Phasic                   !                       ! +------------------------+-------------------------+-----------------------+ ! Axillary                ! Phasic                   !                       ! +------------------------+-------------------------+-----------------------+ ! Brachial                !Phasic                   !                       ! +------------------------+-------------------------+-----------------------+ Left UE Vein Measurements Doppler Measurements +------------------------+-------------------------+-----------------------+ ! Location                ! Signal                   !Reflux                 ! +------------------------+-------------------------+-----------------------+ ! SCV                     ! Pulsatile                !                       ! +------------------------+-------------------------+-----------------------+    IR US GUIDED PARACENTESIS    Result Date: 1/20/2023  PROCEDURE: PARACENTESIS WITHOUT IMAGE GUIDANCE US ABDOMEN LIMITED 1/20/2023 HISTORY: ORDERING SYSTEM PROVIDED HISTORY: Alcoholic cirrhosis of liver with ascites [facility]) TECHNOLOGIST PROVIDED HISTORY: Weekly due o amount drained every 2 weeks TECHNIQUE: Informed consent was obtained after a detailed explanation of the procedure including risks, benefits, and alternatives. Universal protocol was followed. A limited ultrasound of the abdomen was performed. The right abdomen was prepped and draped in sterile fashion and local anesthesia was achieved with lidocaine. A 5 Ivorian needle sheath was advanced into ascites and paracentesis was performed. The patient tolerated the procedure well. Final imaging showed absence of ascitic fluid right side. FINDINGS: Limited ultrasound of the abdomen demonstrates ascites. A total of 3.8 L was removed. Fluid was somewhat chylous in appearance. Successful therapeutic paracentesis. IR US GUIDED PARACENTESIS    Result Date: 1/13/2023  PROCEDURE: PARACENTESIS WITHOUT IMAGE GUIDANCE US ABDOMEN LIMITED 1/13/2023 HISTORY: ORDERING SYSTEM PROVIDED HISTORY: Alcoholic cirrhosis of liver with ascites ([facility] [address] TECHNOLOGIST PROVIDED HISTORY: Weekly due o amount drained every 2 weeks TECHNIQUE: Informed consent was obtained after a explanation of the procedure including risks. Universal protocol was followed. A limited ultrasound of the abdomen was performed. The right abdomen was prepped and draped in sterile fashion, and local anesthesia was achieved with 1% lidocaine. A 5 Ivorian needle sheath was advanced into the ascites under direct ultrasound guidance (a sterile probe cover and gel were used), and paracentesis was performed. A total of 2.2 L of clear yellow fluid was aspirated. The patient tolerated the procedure well without immediately apparent complication. FINDINGS: Initial limited abdominal ultrasound demonstrated a moderate amount of ascites.      Successful paracentesis     IR US GUIDED PARACENTESIS    Result Date: 1/3/2023  PROCEDURE: Ultrasound-guided paracentesis 1/3/2023 HISTORY: ORDERING SYSTEM PROVIDED HISTORY: ascites TECHNOLOGIST PROVIDED HISTORY: ascites TECHNIQUE: This procedure was performed by Aly Stevenson PA-C under indirect supervision of . Informed consent was obtained after a detailed explanation of the procedure including the risks, benefits, and alternatives. Universal protocol was followed. All elements of maximal sterile barrier technique, including cap, mask, sterile gown, sterile gloves, sterile sheet, hand hygiene and 2% chlorhexidine for cutaneous antisepsis were followed. The area was prepped and draped in sterile fashion using maximum barrier technique and local anesthesia was achieved with lidocaine. Pre-procedure ultrasound shows a dominant fluid pocket in the right lowerquadrant. Using ultrasound guidance (image attached to the medical record), the fluid pocket was accessed with a 5 [employer] Y[employer]h needle with aspiration of clear yellow fluid. [employer] vacuum machine was connected and paracentesis was performed and approximately 2200 mL were removed. Post procedural ultrasound demonstrated no residual fluid. A sample was collected and sent for laboratory analysis. Estimated blood loss was minimal. The patient tolerated the procedure well and left the department in good condition. FINDINGS: Limited ultrasound of the abdomen demonstrates ascites. A total of 2200 mL of clear yellow fluid was removed. Successful ultrasound-guided therapeutic paracentesis. IR US GUIDED PARACENTESIS    Result Date: 12/28/2022  PROCEDURE: PARACENTESIS WITH IMAGE GUIDANCE US ABDOMEN LIMITED 12/28/2022 HISTORY: ORDERING SYSTEM PROVIDED HISTORY: ascites TECHNOLOGIST PROVIDED HISTORY: ascites only remove 4 liters give albumin with paracentesis TECHNIQUE: Informed consent was obtained after a detailed explanation of the procedure including risks, benefits, and alternatives. Universal protocol was followed. A limited ultrasound of the abdomen was performed. The right abdomen was prepped and draped in sterile fashion and local anesthesia was achieved with lidocaine.   An 5 Lao needle sheath was advanced into ascites and paracentesis was performed. The patient tolerated the procedure well. A sample was sent as requested. FINDINGS: Limited ultrasound of the abdomen demonstrates ascites. A total of 4.8 L was removed. Successful ultrasound-guided therapeutic and diagnostic paracentesis. IR REVISION TIPS    Result Date: 1/2/2023  PROCEDURE: VR REVISION OF TIPS AND COIL EMBOLIZATION LEFT GASTRIC VEIN General anesthesia 12/30/2022 HISTORY: ORDERING SYSTEM PROVIDED HISTORY:  TIPS early December, now with recurrent bleeding. Reassessment and possible revision/BATO requested. CONTRAST: Isovue 370-110/200 mL SEDATION: General anesthesia. FLUOROSCOPY DOSE AND TYPE OR TIME AND EXPOSURES: Fluoroscopy time-29.1 minutes. [id number]-61856.33 uGy cm squared. DESCRIPTION OF PROCEDURE: Informed consent was obtained from the patient's family (sister and her ) after discussions regarding the procedure, its risks, implications and possible complications. A time-out/universal protocol was obtained in the biplane angiography suite pain. The procedure was performed under general anesthesia. Using 1% lidocaine for local anesthesia sterile technique under real-time sonographic guidance, a 21 gauge micropuncture needle was placed in the right internal jugular vein. With Seldinger technique, a 10 Lao sheath was advanced into the right atrium. Right atrial pressure was measured (mean 12 mm Hg); an angled catheter was then used to select the existing TIPS stent, and manipulated into the portal vein. Contrast material was injected manually opacifying the portal vein. The 5 Lao angled catheter and guidewire were advanced into the portal vein and pressure measurements were obtained in the portal system. A post TIPS 6 mm tripp-systemic gradient was found. 5 Lao pigtail catheter was then inserted and splenoportography performed; digital images were obtained.  The existing catheter was removed, and a rim catheter inserted which was manipulated into the left gastric vein; a Lantern microcatheter was then manipulated into the left gastric vein as far superiorly as possible. Multiple penumbra coils (pods, packing and Di coils). An angled catheter was then manipulated into posterior gastric veins and additional hand contrast injections performed. The stent was then further dilated with a 10 mm balloon. Final digital angiograms were obtained verifying excellent patency of the shunt with no filling of varices. The sheath was then removed and a sterile bandage was placed at the puncture site in the right neck. FINDINGS: As above     TIPS stent reassessment showing 6 mm Hg portosystemic shunt; no significant variceal filling demonstrated, but successful left gastric vein embolization performed. TIPS stent angioplasty to 10 mm with excellent flow demonstrated. The findings were discussed with Dr. Shiraz Duran post procedure. US GUIDED PARACENTESIS    Result Date: 1/6/2023  PROCEDURE: Ultrasound-guided paracentesis 1/6/2023 HISTORY: ORDERING SYSTEM PROVIDED HISTORY: ascities TECHNOLOGIST PROVIDED HISTORY: ascities TECHNIQUE: This procedure was performed by Laila Gonzalez PA-C under indirect supervision of . Informed consent was obtained after a detailed explanation of the procedure including the risks, benefits, and alternatives. Universal protocol was followed. All elements of maximal sterile barrier technique, including cap, mask, sterile gown, sterile gloves, sterile sheet, hand hygiene and 2% chlorhexidine for cutaneous antisepsis were followed. The area was prepped and draped in sterile fashion using maximum barrier technique and local anesthesia was achieved with lidocaine. Pre-procedure ultrasound shows a dominant fluid pocket in the right lowerquadrant.   Using ultrasound guidance (image attached to the medical record), the fluid pocket was accessed with a 5 [employer] Lorrie Yueh needle with aspiration of clear yellow fluid. [employer] vacuum machine was connected and paracentesis was performed and approximately 1200 mL were removed. Post procedural ultrasound demonstrated no residual fluid. A sample was not sent for laboratory analysis. Estimated blood loss was minimal. The patient tolerated the procedure well and left the department in good condition. FINDINGS: Limited ultrasound of the abdomen demonstrates ascites. A total of 1200 mL of clear yellow fluid was removed. Successful ultrasound-guided paracentesis. MRI BRAIN WO CONTRAST    Result Date: 1/2/2023  EXAMINATION: MRI OF THE BRAIN WITHOUT CONTRAST  12/31/2022 2:30 pm TECHNIQUE: Multiplanar multisequence MRI of the brain was performed without the administration of intravenous contrast. COMPARISON: CT head 12/31/2022 and MRI brain 09/30/2020. HISTORY: ORDERING SYSTEM PROVIDED HISTORY: R/O basal ganglia infarct TECHNOLOGIST PROVIDED HISTORY: R/O basal ganglia infarct Reason for Exam: R/O basal ganglia infarct FINDINGS: INTRACRANIAL STRUCTURES/VENTRICLES: There is no acute infarct. No mass effect or midline shift. No evidence of an acute intracranial hemorrhage. The ventricles and sulci are normal in size and configuration. The sellar/suprasellar regions appear unremarkable. The normal signal voids within the major intracranial vessels appear maintained. Mild involutional changes are identified within the brain. Small lacune is identified within the left basal ganglia. ORBITS: The visualized portion of the orbits demonstrate no acute abnormality. SINUSES: Moderate-sized bilateral mastoid effusions are identified. Small right maxillary sinus and left maxillary sinus polyps versus mucous retention cysts are noted. BONES/SOFT TISSUES: The bone marrow signal intensity appears normal. The soft tissues demonstrate no acute abnormality. Low T1 signal abnormality is identified within the marrow elements.   These changes may represent a marrow replacement process such as anemia. No evidence of acute ischemia. New moderate-sized bilateral mastoid effusions. Small old lacune within the left basal ganglia. New low T1 signal within the marrow elements diffusely likely representing a marrow replacement process such as anemia.         Current Facility-Administered Medications   Medication Dose Route Frequency Provider Last Rate Last Admin    sucralfate (CARAFATE) tablet 1 g  1 g Oral 4x Daily Thomas BalderasMASSIMO - NP   1 g at 01/27/23 0757    octreotide (SANDOSTATIN) 500 mcg in sodium chloride 0.9 % 100 mL infusion  50 mcg/hr IntraVENous Continuous Min Smith MD 10 mL/hr at 01/27/23 0329 50 mcg/hr at 01/27/23 0329    melatonin tablet 3 mg  3 mg Oral Nightly PRN Min Smith MD   3 mg at 01/26/23 2337    butalbital-APAP-caffeine -40 MG per capsule 1 capsule  1 capsule Oral Q4H PRN Min Smith MD   1 capsule at 01/26/23 1600    spironolactone (ALDACTONE) tablet 25 mg  25 mg Oral Daily Min Smith MD   25 mg at 01/27/23 0757    sodium chloride flush 0.9 % injection 5-40 mL  5-40 mL IntraVENous 2 times per day Min Smith MD   10 mL at 01/26/23 0847    sodium chloride flush 0.9 % injection 10 mL  10 mL IntraVENous PRN Min Smith MD        0.9 % sodium chloride infusion   IntraVENous PRN Min Smith MD        ondansetron (ZOFRAN-ODT) disintegrating tablet 4 mg  4 mg Oral Q8H PRN Min Smith MD   4 mg at 01/26/23 1600    Or    ondansetron (ZOFRAN) injection 4 mg  4 mg IntraVENous Q6H PRN Min Smith MD        magnesium hydroxide (MILK OF MAGNESIA) 400 MG/5ML suspension 30 mL  30 mL Oral Daily PRN Min Smith MD        acetaminophen (TYLENOL) tablet 650 mg  650 mg Oral Q6H PRN Min Smith MD        Or    acetaminophen (TYLENOL) suppository 650 mg  650 mg Rectal Q6H PRN Augusto Cordova MD        atorvastatin (LIPITOR) tablet 20 mg  20 mg Oral Nightly Tricia Haddad MD   20 mg at 01/26/23 2151    ferrous sulfate (IRON 325) tablet 325 mg  325 mg Oral BID  Tricia Haddad MD   325 mg at 01/27/23 0757    furosemide (LASIX) tablet 40 mg  40 mg Oral BID Tricia Haddad MD   40 mg at 01/27/23 0757    lactulose (CHRONULAC) 10 GM/15ML solution 20 g  20 g Oral TID Tricia Haddad MD   20 g at 01/26/23 2151    pantoprazole (PROTONIX) tablet 40 mg  40 mg Oral Daily Tricia Haddad MD   40 mg at 01/27/23 0757       Impressions :     1. Principal Problem:    Symptomatic anemia, microcytic, acute  Active Problems:    Ascites due to alcoholic cirrhosis ([address]  Resolved Problems:    * No resolved hospital problems. *        2.  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 ([address] Alcoholic cirrhosis of liver with ascites ([address] Anemia (04/13/2022), Anxiety, Arthritis, Back pain, chronic, [place] esophagus, Bleeding gastric varices (12/29/2022), BPH (benign prostatic hypertrophy), Cholelithiasis, Cirrhosis ([address] COVID-19 (12/2020), COVID-19 vaccine series completed (5/20/2021, 6/22/2021), DDD (degenerative disc disease), lumbar, Depression, Esophageal cancer ([address] Esophageal varices ([address] Fatty liver, GERD (gastroesophageal reflux disease), GI bleed, Hep C w/o coma, chronic ([address] History of alcohol abuse, History of blood transfusion, History of colon polyps (2016), History of tobacco abuse, [place] (hard of hearing), Hyperlipidemia, Hypertension, Hyponatremia (07/20/2016), Hypotension (12/20/2021), Mastoid disorder, bilateral (12/31/2022), PONV (postoperative nausea and vomiting), Port-A-Cath in place, Portal hypertension ([address] Sciatica, Secondary esophageal varices ([address] (06/07/2022), Shortness of breath, Spinal stenosis, Stomach ulcer, Thrombocytopenia ([address] (12/23/2020), Tubular adenoma of colon (2016, 2018), Vitamin D deficiency, and Wears glasses.      Plans:     57-year-old male presenting with abnormal lab hemoglobin of 6.4 outpatient, 5.7 in ER  Status post TIPS procedure in November for cirrhosis and subsequent bleed in December from TIPS, with embolization of left gastric vein  History of alcoholic liver disease with cirrhosis, history of EV with banding, esophageal cancer that is post chemo and RT    Severe anemia hemoglobin 5.72 unit PRBC administered GI consulted hemodynamically stable  Alcoholic cirrhosis with ascites status post TIPS-paracentesis every Friday continuing home Lasix spironolactone  Hyponatremia likely secondary to hypervolemia, liver disease-continue Lasix, Aldactone  Alcohol intoxication- on arrival    1/25  Due for ultrasound liver to evaluate TIPS per GI monitor hemoglobin transfuse to keep 7 or above    1/26  Patient did have melanotic stoolX2 overnight with drop in hemoglobin  Made n.p.o., has had only clear liquid this morning we will reach out to GI hopefully EGD today  No new complaints subjectively feeling fatigued but otherwise at baseline    DVT pplx-mechanical only    1/27/2023  Patient had EGD done yesterday by Dr. Tiffany Sepulveda evidence of hiatus hernia  Evidence of telangiectasia in the lower esophagus and stomach with oozing  Patient has had TIPS procedure in the past  Portal hypertension contributing to this telangiectasia  Lab data shows hemoglobin of 0.1 today  Patient is on octreotide drip to help portal hypertension go down  Is on Protonix  He is on full liquid diet  Is going to have scheduled paracentesis today    Brodie Muir MD  1/27/2023  9:36 AM

## 2023-01-27 NOTE — PROGRESS NOTES
Dr Aissatou Martell notified pt complaining of cramping in his hands and requesting something for it.  Reviewed mag was not checked today

## 2023-01-27 NOTE — PLAN OF CARE
Problem: Discharge Planning  Goal: Discharge to home or other facility with appropriate resources  1/27/2023 1741 by Sushila Lucas RN  Outcome: Progressing   Tbd , should be discharged tomorrow per GI    Problem: ABCDS Injury Assessment  Goal: Absence of physical injury  1/27/2023 1741 by Sushila Lucas RN  Outcome: Progressing   See assessment    Problem: Pain  Goal: Verbalizes/displays adequate comfort level or baseline comfort level  1/27/2023 1741 by Sushila Lucas RN  Outcome: Progressing   Cramping in hands this shift.  Dr masters

## 2023-01-27 NOTE — PROGRESS NOTES
Notified NP gwen that pt stated he gets weekly paracentesis's Fridays out pt at 1 pm here.      859 am - she stated they will order para for today

## 2023-01-28 VITALS
TEMPERATURE: 98.1 F | RESPIRATION RATE: 16 BRPM | DIASTOLIC BLOOD PRESSURE: 65 MMHG | BODY MASS INDEX: 29.98 KG/M2 | WEIGHT: 209.44 LBS | SYSTOLIC BLOOD PRESSURE: 105 MMHG | HEART RATE: 79 BPM | OXYGEN SATURATION: 94 % | HEIGHT: 70 IN

## 2023-01-28 LAB
HCT VFR BLD CALC: 24.7 % (ref 41–53)
HCT VFR BLD CALC: 24.9 % (ref 41–53)
HCT VFR BLD CALC: 26 % (ref 41–53)
HEMOGLOBIN: 7.9 G/DL (ref 13.5–17.5)
HEMOGLOBIN: 8 G/DL (ref 13.5–17.5)
HEMOGLOBIN: 8.2 G/DL (ref 13.5–17.5)

## 2023-01-28 PROCEDURE — 99232 SBSQ HOSP IP/OBS MODERATE 35: CPT | Performed by: NURSE PRACTITIONER

## 2023-01-28 PROCEDURE — 6370000000 HC RX 637 (ALT 250 FOR IP): Performed by: NURSE PRACTITIONER

## 2023-01-28 PROCEDURE — 99239 HOSP IP/OBS DSCHRG MGMT >30: CPT | Performed by: INTERNAL MEDICINE

## 2023-01-28 PROCEDURE — 6360000002 HC RX W HCPCS: Performed by: INTERNAL MEDICINE

## 2023-01-28 PROCEDURE — 36415 COLL VENOUS BLD VENIPUNCTURE: CPT

## 2023-01-28 PROCEDURE — 93005 ELECTROCARDIOGRAM TRACING: CPT | Performed by: INTERNAL MEDICINE

## 2023-01-28 PROCEDURE — 85018 HEMOGLOBIN: CPT

## 2023-01-28 PROCEDURE — 6370000000 HC RX 637 (ALT 250 FOR IP): Performed by: INTERNAL MEDICINE

## 2023-01-28 PROCEDURE — 85014 HEMATOCRIT: CPT

## 2023-01-28 RX ORDER — HEPARIN SODIUM (PORCINE) LOCK FLUSH IV SOLN 100 UNIT/ML 100 UNIT/ML
300 SOLUTION INTRAVENOUS PRN
Status: DISCONTINUED | OUTPATIENT
Start: 2023-01-28 | End: 2023-01-28 | Stop reason: HOSPADM

## 2023-01-28 RX ORDER — SUCRALFATE 1 G/1
1 TABLET ORAL 4 TIMES DAILY
Qty: 120 TABLET | Refills: 3 | Status: SHIPPED | OUTPATIENT
Start: 2023-01-28

## 2023-01-28 RX ADMIN — FERROUS SULFATE TAB 325 MG (65 MG ELEMENTAL FE) 325 MG: 325 (65 FE) TAB at 08:22

## 2023-01-28 RX ADMIN — SUCRALFATE 1 G: 1 TABLET ORAL at 13:03

## 2023-01-28 RX ADMIN — LACTULOSE 20 G: 20 SOLUTION ORAL at 08:22

## 2023-01-28 RX ADMIN — SUCRALFATE 1 G: 1 TABLET ORAL at 08:22

## 2023-01-28 RX ADMIN — Medication 300 UNITS: at 15:16

## 2023-01-28 RX ADMIN — PANTOPRAZOLE SODIUM 40 MG: 40 TABLET, DELAYED RELEASE ORAL at 08:22

## 2023-01-28 RX ADMIN — FUROSEMIDE 40 MG: 40 TABLET ORAL at 08:22

## 2023-01-28 RX ADMIN — SPIRONOLACTONE 25 MG: 25 TABLET ORAL at 08:22

## 2023-01-28 NOTE — CARE COORDINATION
ONGOING DISCHARGE PLAN:    Patient is alert and oriented x4. Spoke with patient regarding discharge plan and patient confirms that plan is still home with [facility]. POD#2 EGD-HH with telangiestasia with oozing blood, cauterized  PPI/carafate  On Room Air  Hgb 7.9  Octreotide drip decreased on 1/27    Will continue to follow for additional discharge needs. Electronically signed by Keaton Bey RN on 1/28/2023 at 9:57 AM     LACY, discharge orders, and face sheet faxed to [facility].  Electronically signed by Keaton Bey RN on 1/28/2023 at 11:43 AM

## 2023-01-28 NOTE — PROGRESS NOTES
Discharge instructions reviewed with patient. Patient verbalized understanding and had no further questions. Patients port was hep locked per order.

## 2023-01-28 NOTE — DISCHARGE SUMMARY
[place] [address] [facility]    Discharge Summary     Patient ID: Jojo Burr  :     MRN: [id number]     ACCOUNT:  [de-identified]   Patient's PCP: VASU Maldonado  Admit Date: 2023   Discharge Date: 2023    Length of Stay: 4  Code Status:  Full Code  Admitting Physician: Katarina Billy MD  Discharge Physician: Rima Walker MD     Active Discharge Diagnoses:     Primary Problem  Symptomatic anemia      Hospital Problems  Active Hospital Problems    Diagnosis Date Noted    Ascites due to alcoholic cirrhosis ([facility] [address] [X74.78] 2022     Priority: Medium    Symptomatic anemia, microcytic, acute [D64.9] 2021       Admission Condition:  fair     Discharged Condition: fair    Hospital Stay:     Hospital Course:  Jojo Burr is a 60 y.o. male who was admitted for the management of Symptomatic anemia , presented to ER with Abnormal Lab (Pt reports a hemoglobin of 100)  50-year-old gentleman with history of chronic liver disease cirrhosis with portal hypertension and portal hypertensive bleed status post TIPS procedure with revision in December l  Patient also known to have stage II esophageal cancer status post chemoradiation ast year admitted with a dark stool with melena  On admission hemoglobin was 5.7 patient received transfusion and paracentesis  Patient also has hyponatremia secondary to chronic liver disease and Lasix  Patient continues to drink alcohol intoxication blood alcohol level was 252 on admission  Patient underwent EGD  Endoscopy shows has a hiatus hernia with oozing of blood if from telangiectasia like lesions body and antrum were normal no signs of portal hypertensive gastropathy duodenum and bulb was normal  Patient was treated with octreotide and medically managed hemoglobin stabilized  Counseled against the use of alcohol advised to follow-up with PCP and gastroenterology within a week  PT AGREES  Consult dr Caren Bernal gi  Procedure egd  Significant therapeutic interventions:     Significant Diagnostic Studies:   Labs / Micro:        ,     Radiology:    XR RADIUS ULNA RIGHT (2 VIEWS)    Result Date: 1/1/2023  EXAMINATION: TWO XRAY VIEWS OF THE RIGHT FOREARM 1/1/2023 5:50 pm COMPARISON: None. HISTORY: ORDERING SYSTEM PROVIDED HISTORY: swelling R forearm TECHNOLOGIST PROVIDED HISTORY: swelling R forearm FINDINGS: No fracture, dislocation, or focal osseous lesion is noted. Mild volar soft tissue swelling. No fracture or dislocation. Mild soft tissue swelling     CT HEAD WO CONTRAST    Result Date: 12/31/2022  EXAMINATION: CT OF THE HEAD WITHOUT CONTRAST; CT OF THE CERVICAL SPINE WITHOUT CONTRAST 12/31/2022 1:58 am TECHNIQUE: CT of the head was performed without the administration of intravenous contrast. Automated exposure control, iterative reconstruction, and/or weight based adjustment of the mA/kV was utilized to reduce the radiation dose to as low as reasonably achievable.; CT of the cervical spine was performed without the administration of intravenous contrast. Multiplanar reformatted images are provided for review. Automated exposure control, iterative reconstruction, and/or weight based adjustment of the mA/kV was utilized to reduce the radiation dose to as low as reasonably achievable. COMPARISON: 12/07/2021 MRI cervical spine. 09/30/2020 MRI brain. HISTORY: ORDERING SYSTEM PROVIDED HISTORY: reduced mentation post fall R/O intracranial bleed TECHNOLOGIST PROVIDED HISTORY: reduced mentation post fall R/O intracranial bleed; ORDERING SYSTEM PROVIDED HISTORY: unwitnessed ground level fall. TECHNOLOGIST PROVIDED HISTORY: unwitnessed ground level fall. FINDINGS: CT BRAIN: BRAIN/VENTRICLES: There is no acute intracranial hemorrhage, mass effect or midline shift. No abnormal extra-axial fluid collection. Involutional parenchymal changes. Age-indeterminate (likely chronic) left basal ganglia lacunar infarct.   No large territorial hypodensity. There is no evidence of hydrocephalus. Stable 0.6 cm peripherally calcified cystic pineal lesion, likely an incidental pineal cyst. ORBITS: The visualized portion of the orbits demonstrate no acute abnormality. SINUSES: The visualized paranasal sinuses and mastoid air cells demonstrate no acute abnormality. SOFT TISSUES/SKULL:  No acute abnormality of the visualized skull or soft tissues. CT CERVICAL SPINE: BONES/ALIGNMENT: There is no acute fracture or traumatic malalignment. DEGENERATIVE CHANGES: Multilevel cervical spondylosis, most notable at C5-C6 and C6-C7. No high-grade bony spinal canal stenosis. SOFT TISSUES: There is no prevertebral soft tissue swelling. Partially visualized right IJ approach chest port. CT BRAIN: 1. No evidence of acute hemorrhage, large territorial hypodensity, significant mass effect/midline shift, or ventriculomegaly 2. Involutional parenchymal changes. 3. Age-indeterminate (likely chronic) left basal ganglia lacunar infarct. If clinically indicated, can consider further evaluation with MRI if no contraindications. CT CERVICAL SPINE: 1. No acute abnormality of the cervical spine. 2. Multilevel cervical spondylosis, most notable at C5-C6 and C6-C7. No high-grade bony spinal canal stenosis. CT CERVICAL SPINE WO CONTRAST    Result Date: 12/31/2022  EXAMINATION: CT OF THE HEAD WITHOUT CONTRAST; CT OF THE CERVICAL SPINE WITHOUT CONTRAST 12/31/2022 1:58 am TECHNIQUE: CT of the head was performed without the administration of intravenous contrast. Automated exposure control, iterative reconstruction, and/or weight based adjustment of the mA/kV was utilized to reduce the radiation dose to as low as reasonably achievable.; CT of the cervical spine was performed without the administration of intravenous contrast. Multiplanar reformatted images are provided for review.  Automated exposure control, iterative reconstruction, and/or weight based adjustment of the mA/kV was utilized to reduce the radiation dose to as low as reasonably achievable. COMPARISON: 12/07/2021 MRI cervical spine. 09/30/2020 MRI brain. HISTORY: ORDERING SYSTEM PROVIDED HISTORY: reduced mentation post fall R/O intracranial bleed TECHNOLOGIST PROVIDED HISTORY: reduced mentation post fall R/O intracranial bleed; ORDERING SYSTEM PROVIDED HISTORY: unwitnessed ground level fall. TECHNOLOGIST PROVIDED HISTORY: unwitnessed ground level fall. FINDINGS: CT BRAIN: BRAIN/VENTRICLES: There is no acute intracranial hemorrhage, mass effect or midline shift. No abnormal extra-axial fluid collection. Involutional parenchymal changes. Age-indeterminate (likely chronic) left basal ganglia lacunar infarct. No large territorial hypodensity. There is no evidence of hydrocephalus. Stable 0.6 cm peripherally calcified cystic pineal lesion, likely an incidental pineal cyst. ORBITS: The visualized portion of the orbits demonstrate no acute abnormality. SINUSES: The visualized paranasal sinuses and mastoid air cells demonstrate no acute abnormality. SOFT TISSUES/SKULL:  No acute abnormality of the visualized skull or soft tissues. CT CERVICAL SPINE: BONES/ALIGNMENT: There is no acute fracture or traumatic malalignment. DEGENERATIVE CHANGES: Multilevel cervical spondylosis, most notable at C5-C6 and C6-C7. No high-grade bony spinal canal stenosis. SOFT TISSUES: There is no prevertebral soft tissue swelling. Partially visualized right IJ approach chest port. CT BRAIN: 1. No evidence of acute hemorrhage, large territorial hypodensity, significant mass effect/midline shift, or ventriculomegaly 2. Involutional parenchymal changes. 3. Age-indeterminate (likely chronic) left basal ganglia lacunar infarct. If clinically indicated, can consider further evaluation with MRI if no contraindications. CT CERVICAL SPINE: 1. No acute abnormality of the cervical spine.  2. Multilevel cervical spondylosis, most notable at C5-C6 and C6-C7. No high-grade bony spinal canal stenosis. CT ABDOMEN PELVIS W IV CONTRAST Additional Contrast? Radiologist Recommendation    Result Date: 1/3/2023  EXAMINATION: CT OF THE ABDOMEN AND PELVIS WITH CONTRAST 1/3/2023 1:58 am TECHNIQUE: CT of the abdomen and pelvis was performed with the administration of intravenous contrast. Multiplanar reformatted images are provided for review. Automated exposure control, iterative reconstruction, and/or weight based adjustment of the mA/kV was utilized to reduce the radiation dose to as low as reasonably achievable. COMPARISON: 12/27/2022 HISTORY: ORDERING SYSTEM PROVIDED HISTORY: rectal bleeding TECHNOLOGIST PROVIDED HISTORY: rectal bleeding Reason for Exam: rectal bleeding FINDINGS: Lower Chest: Small to moderate bilateral pleural effusions. Opacification along the posterior margin of the lower lobes suggesting compressive atelectasis. There is a small to moderate the effusions were present on the prior study as well. Mild thickening of the distal esophagus. Pericardial effusion as well. Organs: Cirrhotic liver with nodular margin. A tips shunt is present. The contrast timing is not dedicated for portal venous phase but the shunt appears to be patent. Gallbladder does contain multiple calcified gallstones and is mildly distended. There is ascites around the margin of the liver and in the upper abdomen degrading evaluation for the local inflammation. Spleen is stable. No pancreatic ductal dilatation and the pancreatic enhancement is normal.  Adrenal glands are not enlarged. Kidneys are enhancing equally without hydronephrosis or hydroureter. GI/Bowel: Mild thickening the gastric mucosal folds and into the distal esophagus. There are fluid distended loops of small bowel in the mid and lower abdomen. Edema of the wall of the small bowel loops in the right lower quadrant with the surrounding ascites. Fluid is also present in the colon and rectum.   There is no significant solid fecal load. There is a short segment of very collapsed rectum near the rectosigmoid junction which is worse than the recent exam and may represent spasm rather than underlying mass. Scattered ascites in the abdomen but no pneumoperitoneum. Pelvis: Urinary bladder is collapsed around a Neal catheter and the wall is not evaluated. The prostate is not enlarged. Peritoneum/Retroperitoneum: Atherosclerosis of the aorta and branches but no abdominal aortic aneurysm. Peripherally calcified structure in the left upper quadrant could be a thrombosed splenic artery aneurysm measuring up to 3.1 cm but unchanged from prior exams. . Bones/Soft Tissues: Diffuse degenerative changes in the spine. Stable appearance of T12. body wall edema. 1.  There is a short segment of narrowing of the rectum near the rectosigmoid junction compared to the recent exam and may represent spasm rather than underlying mass. Fluid-filled loops of small bowel and colon suggesting diarrhea with enteritis or colitis. Evaluation of true wall thickening is limited by the diffuse edematous state. 2.  Cirrhotic liver with tips, ascites, and sequela of portal hypertension. 3.  Thickening of the gastric mucosal folds and into the distal esophagus. Correlate with prior history of malignancy. 4.  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 5.  Pleural effusions and pericardial effusion are redemonstrated. The opacified portions of the lower lungs appears to be from compressive atelectasis.      US LIVER    Result Date: 1/26/2023  EXAMINATION: RIGHT UPPER QUADRANT ULTRASOUND 1/25/2023 1:26 pm COMPARISON: 12/28/2022, 12/30/2022 HISTORY: ORDERING SYSTEM PROVIDED HISTORY: Evaluate TIPS TECHNOLOGIST PROVIDED HISTORY: Evaluate TIPS FINDINGS: LIVER:  Liver has a somewhat coarse heterogeneous echotexture with surface nodularity compatible with history of cirrhosis. Liver length is 15 cm. By ultrasound no focal liver masses are seen. There is no intrahepatic biliary dilatation. TIPS stent is visualized and demonstrates flow with color Doppler evaluation. Velocities are somewhat low but this may be on a technical basis. BILIARY SYSTEM:  There are stones in the gallbladder without convincing sonographic findings of cholecystitis. Common bile duct could not be visualized. RIGHT KIDNEY: The right kidney is grossly unremarkable without evidence of hydronephrosis. PANCREAS:  Pancreas is not seen. OTHER: Moderate perihepatic ascites. Small right pleural effusion. Cirrhotic appearance of liver with a TIPS stent which appears grossly patent. Ascites. Small right pleural effusion. Cholelithiasis. US GALLBLADDER RUQ    Result Date: 12/30/2022  EXAMINATION: RIGHT UPPER QUADRANT ULTRASOUND 12/30/2022 9:26 am COMPARISON: CT scan of the abdomen and pelvis from 12/27/2022 HISTORY: ORDERING SYSTEM PROVIDED HISTORY: Gall stones, inc SIRS. R/O Cholecystitis TECHNOLOGIST PROVIDED HISTORY: Gall stones, inc SIRS. R/O Cholecystitis FINDINGS: LIVER:  The liver demonstrates irregular contour and heterogeneous echogenicity compatible with known cirrhosis; intrahepatic biliary tree appears nondilated. Functioning tips stent identified (proximal, mid and distal velocities 54.1, 26.6, 25.8 cm/sec). BILIARY SYSTEM:  Gallbladder contains multiple known echogenic stones without wall thickening, or obvious wall edema. Negative sonographic Silva's sign. Common bile duct is within normal limits measuring 2.2 mm. RIGHT KIDNEY: The right kidney is grossly unremarkable without evidence of hydronephrosis. Right kidney measures 11.3 x 5.4 x 6.2 cm; cortical thickness 1.5 cm. PANCREAS:  Visualized portions of the pancreas are unremarkable. OTHER: Moderate right-sided ascites. Small-Moderate right pleural effusion. Multiple cholelithiasis but no ultrasound evidence cholecystitis. Nondilated biliary tree. Cirrhosis. Patent TIPS. Ascites. Additional findings, as above. XR CHEST PORTABLE    Result Date: 1/24/2023  EXAMINATION: ONE XRAY VIEW OF THE CHEST 1/24/2023 8:24 pm COMPARISON: 01/07/2023 CT chest and 01/03/2023 chest radiograph HISTORY: ORDERING SYSTEM PROVIDED HISTORY: cough TECHNOLOGIST PROVIDED HISTORY: cough Reason for Exam: cough FINDINGS: Right chest port catheter tip overlies the right atrium. The cardiomediastinal silhouette is enlarged, unchanged. Small left and trace right pleural effusions. Bibasilar and left midlung opacities are favored to represent edema and/or atelectasis. No pneumothorax. Osseous structures are unchanged. 1.  Small left and trace right pleural effusions. 2.  Opacities in the left perihilar region and bilateral lung bases may represent a combination of edema and atelectasis. Superimposed infectious process is not excluded. XR CHEST PORTABLE    Result Date: 1/3/2023  EXAMINATION: ONE XRAY VIEW OF THE CHEST 1/3/2023 7:22 am COMPARISON: 01/02/2023, 12/31/2022 HISTORY: ORDERING SYSTEM PROVIDED HISTORY: sepsis TECHNOLOGIST PROVIDED HISTORY: sepsis FINDINGS: Cardiac silhouette enlargement again noted. Right internal jugular port in place. Vascular congestion, basilar opacities and small pleural effusions appear increasing. No pneumothorax identified. Increasing vascular congestion with small pleural effusions, suggestive of developing edema. XR CHEST PORTABLE    Result Date: 1/2/2023  EXAMINATION: ONE XRAY VIEW OF THE CHEST 1/2/2023 9:39 am COMPARISON: Chest x-ray dated 31 December 2022 HISTORY: ORDERING SYSTEM PROVIDED HISTORY: Bilateral arm swelling TECHNOLOGIST PROVIDED HISTORY: Bilateral arm swelling FINDINGS: Right-sided chest port catheter with the tip at the SVC/atrial junction. Mild right basilar atelectasis. Stable cardiomediastinal silhouette.   Mild pulmonary vascular congestion     Mild pulmonary vascular congestion without evidence of overt edema     XR CHEST PORTABLE    Result Date: 12/31/2022  EXAMINATION: ONE XRAY VIEW OF THE CHEST 12/31/2022 6:22 am COMPARISON: 30 December 2022 HISTORY: ORDERING SYSTEM PROVIDED HISTORY: Bilateral pneumonia TECHNOLOGIST PROVIDED HISTORY: Bilateral pneumonia FINDINGS: AP portable view of the chest time stamped at 615 hours demonstrates overlying cardiac monitoring electrodes. Fusion port enters from the right terminating in the superior vena cava. Stable cardiomegaly. There is no significant interval change in bilateral effusions left greater than right, vascular congestion, basilar atelectasis and other pulmonary opacities which may represents superimposed edema or airspace disease. Little change from prior study. Cardiomegaly, vascular congestion, effusions, atelectasis and possible superimposed acute airspace disease are again noted. XR CHEST PORTABLE    Result Date: 12/30/2022  EXAMINATION: ONE XRAY VIEW OF THE CHEST 12/30/2022 9:03 am COMPARISON: AP chest from 12/27/2022 HISTORY: ORDERING SYSTEM PROVIDED HISTORY: Worsening PNA TECHNOLOGIST PROVIDED HISTORY: Worsening PNA FINDINGS: Right IJ chemo port tip position stable. Overlying ECG monitor leads. Mildly enlarged cardiac silhouette. Mediastinal structures midline unchanged. Cephalization of blood flow and hazy airspace opacities mid lower zones greater retrocardiac space, compatible with bilateral pleural effusions, compressive atelectasis, and possibly infiltrate. No Kerley lines but mild central vascular congestion likely. Bones unchanged. Cardiomegaly with probable mild central vascular congestion and similar bilateral pleural effusions with compressive atelectasis; filtrate at either base a consideration. No pulmonary edema.      CT CHEST PULMONARY EMBOLISM W CONTRAST    Result Date: 1/7/2023  EXAMINATION: CTA OF THE CHEST 1/7/2023 10:07 pm TECHNIQUE: CTA of the chest was performed after the administration of intravenous contrast.  Multiplanar reformatted images are provided for review. MIP images are provided for review. Automated exposure control, iterative reconstruction, and/or weight based adjustment of the mA/kV was utilized to reduce the radiation dose to as low as reasonably achievable. COMPARISON: None. HISTORY: ORDERING SYSTEM PROVIDED HISTORY: SOB DVT TECHNOLOGIST PROVIDED HISTORY: SOB DVT Reason for Exam: SOB DVT FINDINGS: Pulmonary Arteries: Pulmonary arteries are adequately opacified for evaluation. No evidence of intraluminal filling defect to suggest pulmonary embolism. Main pulmonary artery is normal in caliber. Mediastinum: No evidence of mediastinal lymphadenopathy. The heart is normal size. Moderate pericardial effusion. .  There is no acute abnormality of the thoracic aorta. Lungs/pleura: Moderate bilateral pleural effusion is associated with atelectatic changes of right lower lobe and lingula and left lower lobe. . Focal consolidative change within the anterior aspect of right upper lobe peripheral aspect of left upper lobe and medial aspect of right middle lobe are likely suggestive of multifocal pneumonia. The lungs are otherwise without acute process. No pulmonary edema. No pneumothorax. Calcified granuloma within the lingula. Upper Abdomen: Cirrhotic liver with TIPS, ascites and portal hypertension . Calcified thrombosed splenic artery measuring 3.1 cm. .  Cholelithiasis with no signs of cholecystitis Soft Tissues/Bones: No acute bone or soft tissue abnormality. No evidence of pulmonary embolism. Moderate bilateral pleural effusion is associated with atelectatic changes of right lower lobe and lingula and left lower lobe. . Focal consolidative change within the anterior aspect of right upper lobe peripheral aspect of left upper lobe and medial aspect of right middle lobe are likely suggestive of multifocal pneumonia. Moderate pericardial effusion.  Cirrhotic liver with TIPS, ascites and portal hypertension . Calcified thrombosed splenic artery measuring 3.1 cm. . RECOMMENDATIONS: Unavailable     VL DUP UPPER EXTREMITY VENOUS RIGHT    Result Date: 1/7/2023    [facility]  Vascular Upper Extremities Veins Procedure   Patient Name   Agnieszka Benz    Date of Study           01/07/2023                 ALHAJI HSU   Date of Birth  1960  Gender                  Male   Age            60 year(s)  Race                       Room Number    5589        Height:                 70 inch, 177.8 cm   Corporate ID # [id number]    Weight:                 192 pounds, 87.1 kg   Patient Acct # [de-identified]   BSA:        2.05 m^2    BMI:      27.55 kg/m^2   MR #           [id number]     Sonographer             Dennys Hill   Accession #    [id number]  Interpreting Physician  Thony Walton   Referring                  Referring Physician     Padmini Gatica  Nurse  Practitioner  Procedure Type of Study:   Veins: Upper Extremities Veins, Venous Scan Upper Right. Indications for Study:R/O DVT and Arm swelling. Patient Status: In Patient. - Critical Result:Madie HSU RN at 5:01 PM.  Conclusions   Summary   Simultaneous real time imaging utilizing B-Mode, color doppler and  spectral waveform analysis was performed on the right upper extremity for  venous examination of the deep and superficial systems. Findings are:   Right:  Acute deep venous thrombosis identified in the brachial vein. Superficial venous thrombosis of the cephalic vein in the forearm.    Signature   ----------------------------------------------------------------  Electronically signed by Javier CarpenterSonographer) on  01/07/2023 05:01 PM  ----------------------------------------------------------------   ----------------------------------------------------------------  Electronically signed by Merlin Beers, Mohammed([facility]  physician) on 01/07/2023 07:54 PM  ----------------------------------------------------------------  Findings:   Right Impression: The brachial vein is partially compressible with hypoechoic echoes and  phasic color Doppler signals. The cephalic vein is non compressible with hypoechoic echoes with absent  color Doppler signals throughout the forearm. Internal jugular, subclavian, axillary, radial, proximal cephalic and  basilic veins are compressible with normal doppler responses. The ulnar vein was not visualized. Risk Factors History +---------+----+-----------------------------------------------------------+ ! Diagnosis! Date! Comments                                                   ! +---------+----+-----------------------------------------------------------+ ! Other    ! !Port-a-cath in place right chest.                          ! +---------+----+-----------------------------------------------------------+   - The patient's risk factor(s) include: dyslipidemia and arterial     hypertension.   - The patient has a former tobacco history. - The patient has esophageal cancer. Velocities are measured in cm/s ; Diameters are measured in cm Right UE Vein Measurements 2D Measurements +------------------------------------+----------+---------------+----------+ ! Location                            ! Visualized! Compressibility! Thrombosis! +------------------------------------+----------+---------------+----------+ ! Prox IJV                            ! Yes       ! Yes            ! None      ! +------------------------------------+----------+---------------+----------+ ! Dist IJV                            ! Yes       ! Yes            ! None      ! +------------------------------------+----------+---------------+----------+ ! Prox SCV                            ! Yes       ! !None      ! +------------------------------------+----------+---------------+----------+ ! Dist SCV                            ! Yes       ! !None      ! +------------------------------------+----------+---------------+----------+ ! Prox Axillary                       ! Yes       ! Yes            ! None      ! +------------------------------------+----------+---------------+----------+ ! Dist Axillary                       ! Yes       ! Yes            ! None      ! +------------------------------------+----------+---------------+----------+ ! Prox Brachial                       !Yes       ! Partial        !AI        ! +------------------------------------+----------+---------------+----------+ ! Dist Brachial                       !Yes       ! Yes            ! None      ! +------------------------------------+----------+---------------+----------+ ! Prox Radial                         !Yes       ! Yes            ! None      ! +------------------------------------+----------+---------------+----------+ ! Dist Radial                         !Yes       ! Yes            ! None      ! +------------------------------------+----------+---------------+----------+ ! Prox Ulnar                          ! No        !               !          ! +------------------------------------+----------+---------------+----------+ ! Dist Ulnar                          ! No        !               !          ! +------------------------------------+----------+---------------+----------+ ! Basilic at UA                       ! Yes       ! Yes            ! None      ! +------------------------------------+----------+---------------+----------+ ! Basilic at AF                       ! Yes       ! Yes            ! None      ! +------------------------------------+----------+---------------+----------+ ! Basilic at [address]                       ! Yes       ! Yes            ! None      ! +------------------------------------+----------+---------------+----------+ ! Cephalic at UA                      ! Yes       ! Yes            ! None      ! +------------------------------------+----------+---------------+----------+ ! Cephalic at AF                      ! Yes       ! Yes            ! None      ! +------------------------------------+----------+---------------+----------+ ! Cephalic at [address]                      ! Yes       ! No             !AI        ! +------------------------------------+----------+---------------+----------+ Doppler Measurements +------------------------+-------------------------+-----------------------+ ! Location                ! Signal                   !Reflux                 ! +------------------------+-------------------------+-----------------------+ ! IJV                     ! Pulsatile                !                       ! +------------------------+-------------------------+-----------------------+ ! SCV                     ! Phasic                   !                       ! +------------------------+-------------------------+-----------------------+ ! Axillary                ! Phasic                   !                       ! +------------------------+-------------------------+-----------------------+ ! Brachial                !Phasic                   !                       ! +------------------------+-------------------------+-----------------------+ Left UE Vein Measurements Doppler Measurements +------------------------+-------------------------+-----------------------+ ! Location                ! Signal                   !Reflux                 ! +------------------------+-------------------------+-----------------------+ ! SCV                     ! Pulsatile                !                       ! +------------------------+-------------------------+-----------------------+    IR US GUIDED PARACENTESIS    Result Date: 1/20/2023  PROCEDURE: PARACENTESIS WITHOUT IMAGE GUIDANCE US ABDOMEN LIMITED 1/20/2023 HISTORY: ORDERING SYSTEM PROVIDED HISTORY: Alcoholic cirrhosis of liver with ascites ([address] TECHNOLOGIST PROVIDED HISTORY: Weekly due o amount drained every 2 weeks TECHNIQUE: Informed consent was obtained after a detailed explanation of the procedure including risks, benefits, and alternatives. Universal protocol was followed. A limited ultrasound of the abdomen was performed. The right abdomen was prepped and draped in sterile fashion and local anesthesia was achieved with lidocaine. A 5 [place] needle sheath was advanced into ascites and paracentesis was performed. The patient tolerated the procedure well. Final imaging showed absence of ascitic fluid right side. FINDINGS: Limited ultrasound of the abdomen demonstrates ascites. A total of 3.8 L was removed. Fluid was somewhat chylous in appearance. Successful therapeutic paracentesis. IR US GUIDED PARACENTESIS    Result Date: 1/13/2023  PROCEDURE: PARACENTESIS WITHOUT IMAGE GUIDANCE US ABDOMEN LIMITED 1/13/2023 HISTORY: ORDERING SYSTEM PROVIDED HISTORY: Alcoholic cirrhosis of liver with ascites ([facility] [address] TECHNOLOGIST PROVIDED HISTORY: Weekly due o amount drained every 2 weeks TECHNIQUE: Informed consent was obtained after a explanation of the procedure including risks. Universal protocol was followed. A limited ultrasound of the abdomen was performed. The right abdomen was prepped and draped in sterile fashion, and local anesthesia was achieved with 1% lidocaine. A 5 [place] needle sheath was advanced into the ascites under direct ultrasound guidance (a sterile probe cover and gel were used), and paracentesis was performed. A total of 2.2 L of clear yellow fluid was aspirated. The patient tolerated the procedure well without immediately apparent complication. FINDINGS: Initial limited abdominal ultrasound demonstrated a moderate amount of ascites. Successful paracentesis     IR US GUIDED PARACENTESIS    Result Date: 1/3/2023  PROCEDURE: Ultrasound-guided paracentesis 1/3/2023 HISTORY: ORDERING SYSTEM PROVIDED HISTORY: ascites TECHNOLOGIST PROVIDED HISTORY: ascites TECHNIQUE: This procedure was performed by Marietta Koch PA-C under indirect supervision of .  Informed consent was obtained after a detailed explanation of the procedure including the risks, benefits, and alternatives. Universal protocol was followed. All elements of maximal sterile barrier technique, including cap, mask, sterile gown, sterile gloves, sterile sheet, hand hygiene and 2% chlorhexidine for cutaneous antisepsis were followed. The area was prepped and draped in sterile fashion using maximum barrier technique and local anesthesia was achieved with lidocaine. Pre-procedure ultrasound shows a dominant fluid pocket in the right lowerquadrant. Using ultrasound guidance (image attached to the medical record), the fluid pocket was accessed with a 5 [place] Lorrie Yueh needle with aspiration of clear yellow fluid. InEdge vacuum machine was connected and paracentesis was performed and approximately 2200 mL were removed. Post procedural ultrasound demonstrated no residual fluid. A sample was collected and sent for laboratory analysis. Estimated blood loss was minimal. The patient tolerated the procedure well and left the department in good condition. FINDINGS: Limited ultrasound of the abdomen demonstrates ascites. A total of 2200 mL of clear yellow fluid was removed. Successful ultrasound-guided therapeutic paracentesis. IR REVISION TIPS    Result Date: 1/2/2023  PROCEDURE: VR REVISION OF TIPS AND COIL EMBOLIZATION LEFT GASTRIC VEIN General anesthesia 12/30/2022 HISTORY: ORDERING SYSTEM PROVIDED HISTORY:  TIPS early December, now with recurrent bleeding. Reassessment and possible revision/BATO requested. CONTRAST: Isovue 370-110/200 mL SEDATION: General anesthesia. FLUOROSCOPY DOSE AND TYPE OR TIME AND EXPOSURES: Fluoroscopy time-29.1 minutes. DOT-23586.77 uGy cm squared. DESCRIPTION OF PROCEDURE: Informed consent was obtained from the patient's family (sister and her ) after discussions regarding the procedure, its risks, implications and possible complications. A time-out/universal protocol was obtained in the biplane angiography suite pain.  The procedure was performed under general anesthesia. Using 1% lidocaine for local anesthesia sterile technique under real-time sonographic guidance, a 21 gauge micropuncture needle was placed in the right internal jugular vein. With Seldinger technique, a 10 [place] sheath was advanced into the right atrium. Right atrial pressure was measured (mean 12 mm Hg); an angled catheter was then used to select the existing TIPS stent, and manipulated into the portal vein. Contrast material was injected manually opacifying the portal vein. The 5 [place] angled catheter and guidewire were advanced into the portal vein and pressure measurements were obtained in the portal system. A post TIPS 6 mm tripp-systemic gradient was found. 5 [place] pigtail catheter was then inserted and splenoportography performed; digital images were obtained. The existing catheter was removed, and a rim catheter inserted which was manipulated into the left gastric vein; a Lantern microcatheter was then manipulated into the left gastric vein as far superiorly as possible. Multiple penumbra coils (pods, packing and Di coils). An angled catheter was then manipulated into posterior gastric veins and additional hand contrast injections performed. The stent was then further dilated with a 10 mm balloon. Final digital angiograms were obtained verifying excellent patency of the shunt with no filling of varices. The sheath was then removed and a sterile bandage was placed at the puncture site in the right neck. FINDINGS: As above     TIPS stent reassessment showing 6 mm Hg portosystemic shunt; no significant variceal filling demonstrated, but successful left gastric vein embolization performed. TIPS stent angioplasty to 10 mm with excellent flow demonstrated. The findings were discussed with Dr. Castañeda post procedure.      US GUIDED PARACENTESIS    Result Date: 1/6/2023  PROCEDURE: Ultrasound-guided paracentesis 1/6/2023 HISTORY: [address] HISTORY: ascities TECHNOLOGIST PROVIDED HISTORY: ascities TECHNIQUE: This procedure was performed by Osman Ford PA-C under indirect supervision of . Informed consent was obtained after a detailed explanation of the procedure including the risks, benefits, and alternatives. Universal protocol was followed. All elements of maximal sterile barrier technique, including cap, mask, sterile gown, sterile gloves, sterile sheet, hand hygiene and 2% chlorhexidine for cutaneous antisepsis were followed. The area was prepped and draped in sterile fashion using maximum barrier technique and local anesthesia was achieved with lidocaine. Pre-procedure ultrasound shows a dominant fluid pocket in the right lowerquadrant. Using ultrasound guidance (image attached to the medical record), the fluid pocket was accessed with a 5 [place] Lor[place] Yueh needle with aspiration of clear yellow fluid. [employer] vacuum machine was connected and paracentesis was performed and approximately 1200 mL were removed. Post procedural ultrasound demonstrated no residual fluid. A sample was not sent for laboratory analysis. Estimated blood loss was minimal. The patient tolerated the procedure well and left the department in good condition. FINDINGS: Limited ultrasound of the abdomen demonstrates ascites. A total of 1200 mL of clear yellow fluid was removed. Successful ultrasound-guided paracentesis. MRI BRAIN WO CONTRAST    Result Date: 1/2/2023  EXAMINATION: MRI OF THE BRAIN WITHOUT CONTRAST  12/31/2022 2:30 pm TECHNIQUE: Multiplanar multisequence MRI of the brain was performed without the administration of intravenous contrast. COMPARISON: CT head 12/31/2022 and MRI brain 09/30/2020. HISTORY: ORDERING SYSTEM PROVIDED HISTORY: R/O basal ganglia infarct TECHNOLOGIST PROVIDED HISTORY: R/O basal ganglia infarct Reason for Exam: R/O basal ganglia infarct FINDINGS: INTRACRANIAL STRUCTURES/VENTRICLES: There is no acute infarct.  No mass effect or midline shift. No evidence of an acute intracranial hemorrhage. The ventricles and sulci are normal in size and configuration. The sellar/suprasellar regions appear unremarkable. The normal signal voids within the major intracranial vessels appear maintained. Mild involutional changes are identified within the brain. Small lacune is identified within the left basal ganglia. ORBITS: The visualized portion of the orbits demonstrate no acute abnormality. SINUSES: Moderate-sized bilateral mastoid effusions are identified. Small right maxillary sinus and left maxillary sinus polyps versus mucous retention cysts are noted. BONES/SOFT TISSUES: The bone marrow signal intensity appears normal. The soft tissues demonstrate no acute abnormality. Low T1 signal abnormality is identified within the marrow elements. These changes may represent a marrow replacement process such as anemia. No evidence of acute ischemia. New moderate-sized bilateral mastoid effusions. Small old lacune within the left basal ganglia. New low T1 signal within the marrow elements diffusely likely representing a marrow replacement process such as anemia. Consultations:    Consults:     Final Specialist Recommendations/Findings:   IP CONSULT TO GI      The patient was seen and examined on day of discharge and this discharge summary is in conjunction with any daily progress note from day of discharge.     Discharge plan:     Disposition: Home    Physician Follow Up:     [address] [facility] [phone]           Requiring Further Evaluation/Follow Up POST HOSPITALIZATION/Incidental Findings:    Diet: cardiac diet    Activity: As tolerated    Instructions to Patient:     Discharge Medications:      Medication List        START taking these medications      sucralfate 1 GM tablet  Commonly known as: CARAFATE  Take 1 tablet by mouth 4 times daily            CONTINUE taking these medications atorvastatin 20 MG tablet  Commonly known as: LIPITOR  Take 1 tablet by mouth nightly     FeroSul 325 (65 Fe) MG tablet  Generic drug: ferrous sulfate  take 1 tablet by mouth twice a day     furosemide 40 MG tablet  Commonly known as: LASIX  Take 1 tablet by mouth in the morning and 1 tablet in the evening. lactulose 10 GM/15ML solution  Commonly known as: CHRONULAC  take 30 milliliters by mouth three times a day     pantoprazole 40 MG tablet  Commonly known as: PROTONIX     spironolactone 25 MG tablet  Commonly known as: ALDACTONE  Take 1 tablet by mouth daily               Where to Get Your Medications        These medications were sent to [address] [facility], [address] [address]      Phone: [phone]   sucralfate 1 GM tablet         Time Spent on discharge is  35 mins in patient examination, evaluation, counseling as well as medication reconciliation, prescriptions for required medications, discharge plan and follow up. Electronically signed by   Donovan Alexis MD  1/28/2023  9:20 AM      Thank you VASU Rivera for the opportunity to be involved in this patient's care.

## 2023-01-28 NOTE — PLAN OF CARE
Problem: Safety - Adult  Goal: Free from fall injury  1/28/2023 0126 by Yaneth Clark RN  Outcome: Progressing  1/27/2023 1741 by Edu Gonzalez RN  Outcome: Progressing     Problem: Discharge Planning  Goal: Discharge to home or other facility with appropriate resources  1/28/2023 0126 by Yaneth Clark RN  Outcome: Progressing  Flowsheets (Taken 1/27/2023 2128)  Discharge to home or other facility with appropriate resources: Identify barriers to discharge with patient and caregiver  1/27/2023 1741 by Edu Gonzalez RN  Outcome: Progressing

## 2023-01-28 NOTE — PROGRESS NOTES
[facility]    Gastroenterology Daily Progress Note      Patient:   Joy Medel   :    [id number]   Facility:   [facility]  Date:     2023  Consultant:   MASSIMO Crain CNP, CNP      SUBJECTIVE  60 y.o. male admitted 2023 with Symptomatic anemia [D64.9]  Anemia, unspecified type [D64.9] and seen for cirrhosis and anemia and melena. The pt was seen and examined. He is alert and oriented, no c/o abdominal pain, had brown stool last night, tolerating a diet, hgb 7.9 no nausea or emesis.  .        OBJECTIVE  Scheduled Meds:   sucralfate  1 g Oral 4x Daily    spironolactone  25 mg Oral Daily    sodium chloride flush  5-40 mL IntraVENous 2 times per day    atorvastatin  20 mg Oral Nightly    ferrous sulfate  325 mg Oral BID WC    furosemide  40 mg Oral BID    lactulose  20 g Oral TID    pantoprazole  40 mg Oral Daily       Vital Signs:  BP (!) 110/46   Pulse 76   Temp 98.3 °F (36.8 °C) (Oral)   Resp 16   Ht 5' 10\" (1.778 m)   Wt 209 lb 7 oz (95 kg)   SpO2 95%   BMI 30.05 kg/m²    Review of Systems - History obtained from chart review and the patient  General ROS: negative  Respiratory ROS: no cough, shortness of breath, or wheezing  Cardiovascular ROS: no chest pain or dyspnea on exertion  Gastrointestinal ROS: no abdominal pain, change in bowel habits, or black or bloody stools     Physical Exam:   General Appearance: alert and oriented to person, place and time, well-developed and well-nourished, in no acute distress  Skin: warm and dry, no rash or erythema  Head: normocephalic and atraumatic  Eyes: pupils equal, round, and reactive to light, extraocular eye movements intact, conjunctivae normal  ENT: hearing grossly normal bilaterally  Neck: neck supple and non tender without mass, no thyromegaly or thyroid nodules, no cervical lymphadenopathy   Pulmonary/Chest: clear to auscultation bilaterally- no wheezes, rales or rhonchi, normal air movement, no respiratory distress  Cardiovascular: normal rate, regular rhythm, normal S1 and S2, no murmurs, rubs, clicks or gallops, distal pulses intact, no carotid bruits  Abdomen: soft, non-tender, non-distended, normal bowel sounds, no masses or organomegaly  Extremities: no cyanosis, clubbing or edema  Musculoskeletal: normal range of motion, no joint swelling, deformity or tenderness  Neurologic: no cranial nerve deficit and muscle strength normal    Lab and Imaging Review     CBC  Recent Labs     01/26/23  0530 01/26/23  1939 01/27/23  0749 01/27/23  1406 01/27/23  2025 01/28/23  0156 01/28/23  0518   WBC 5.3  --  5.9  --   --   --   --    HGB 7.6*   < > 8.1*   < > 7.7* 8.0* 7.9*   HCT 23.8*   < > 25.2*   < > 24.1* 24.9* 24.7*   MCV 84.4  --  84.3  --   --   --   --    PLT 96*  --  99*  --   --   --   --     < > = values in this interval not displayed. BMP  Recent Labs     01/26/23  0530 01/27/23  0749   * 131*   K 3.8 3.9    99   CO2 23 26   BUN 14 10   CREATININE 0.70 0.67*   GLUCOSE 92 137*   CALCIUM 7.9* 8.0*       Findings:egd dr Pacheco Smith 1/26/23     Retropharyngeal area was grossly normal appearing     Esophagus: At the GE junction towards the hiatal hernia there is diffuse oozing of blood noted from telangiectasia like lesions. I believe this may be related to prior radiation therapy. No clear-cut varices seen in the esophagus. Stomach:    Fundus and Cardia Examined in Retroflexed View: abnormal: Has a hiatal hernia and oozing of blood from telangiectasia like lesions. Body: normal    Antrum: normal  No signs of portal hypertensive gastropathy. Duodenum:     Descending: normal    Bulb: normal        As there was accumulation of fresh blood in the hiatal hernia from the telangiectasia like lesions, we did cauterize this area using APC, gastric setting. As patient was retching frequently, I did my best to cauterize most of the lesions.   However, it is possible that still there may be some lesions that we could not cauterize today. These need to be further cauterized at a later time. ASSESSMENT/plan  Anemia with melena, improved s/p egd 1/26 that showed [place] with telangiestasia with oozing of blood that was cauterized  Trend hh  Ppi qd  Carafate      2.decompensated cirrhosis secondary to etoh abuse s/p TIPs revision 12/30 with upsizing of stent and embolization of left gastric vein; currently no sign of encephalopathy    2gm low salt diet  Avoid sedatives and narcotics  Avoid etoh  Continue lasix and aldactone and watch creat  Continue lactulose    Will need f/w with dr Robles Weldon as outpt gi signing off d/w pt    This plan was formulated in collaboration with Dr. Petty Armstrong .     Electronically signed by: MASSIMO Ivy CNP on 1/28/2023 at 7:55 AM

## 2023-01-28 NOTE — CARE COORDINATION
Anand Imre U. 12. Encounter Date/Time: 2023    Hospital Account: [de-identified]    MRN: [id number]    Patient: Yan Kendall    Contact Serial #: [id number]      ENCOUNTER          Patient Class: I Private Enc? No Unit RM BD: [facility] PR[facility]    Hospital Service: MED   Encounter DX: Symptomatic anemia [D64. *   ADM Provider: Keena Marcano MD   Procedure:     ATT Provider: Keena Marcano MD   REF Provider:        Admission DX: Symptomatic anemia, Anemia, unspecified type and DX codes: D64.9, D64.9      PATIENT                 Name: Yan Kendall : 1960 (62 yrs)   Address: [address] [place] [address] Sex: Male   [place] city: [address] [place] [address]         Marital Status: Single   Employer: [address]: [employer]   Primary Care Provider: Alvarez Verduzco         Primary Phone: [phone]   EMERGENCY CONTACT   Contact Name Legal Guardian? Relationship to Patient Home Phone Work Phone   1. Nilam Lisa  2. *No Contact Specified*      Other    [phone]            GUARANTOR            Guarantor: Yan Kendall     : 1960   Address: [facility]  Sex: Male     [place] [address]     Relation to Patient: Self       Home Phone: [phone]   Guarantor ID: [id number]       Work Phone:     Guarantor Employer: [place] [address]  [employer]         Status: RETIRED      COVERAGE        PRIMARY INSURANCE   Payor: [address] [place] [address] [place]: [address] Address: [address]       Group Number: [id number] Insurance Type: [address] Name: Sophie White : 1960   Subscriber ID: [id number] Pat. Rel. to Sub: Self   SECONDARY INSURANCE   Payor:   Plan:     Payor Address:  ,           Group Number:   Insurance Type:     Subscriber Name:   Subscriber :     Subscriber ID:   Pat.  Rel. to Sub:        CSN: [id number]        Medication List    START taking these medications    START taking these medications   sucralfate 1 GM tablet  Commonly known as: CARAFATE  Take 1 tablet by mouth 4 times daily     CONTINUE taking these medications    CONTINUE taking these medications   atorvastatin 20 MG tablet  Commonly known as: LIPITOR  Take 1 tablet by mouth nightly   FeroSul 325 (65 Fe) MG tablet  Generic drug: ferrous sulfate  take 1 tablet by mouth twice a day   furosemide 40 MG tablet  Commonly known as: LASIX  Take 1 tablet by mouth in the morning and 1 tablet in the evening.    lactulose 10 GM/15ML solution  Commonly known as: CHRONULAC  take 30 milliliters by mouth three times a day   pantoprazole 40 MG tablet  Commonly known as: PROTONIX  Take 40 mg by mouth daily   spironolactone 25 MG tablet  Commonly known as: ALDACTONE  Take 1 tablet by mouth daily   Where to Get Your Medications      These medications were sent to [address]  [address] [place] [address], [address] [place] [address]  Phone: [phone]       sucralfate 1 GM tablet             Printing Report    Report Name Print   Discharge Meds      Continuity of Care Form    Patient Name: Yan Kendall   :  [birth year]  MRN:  [id number]    Admit date:  2023  Discharge date:  2023    Code Status Order: Full Code   Advance Directives:     Admitting Physician:  Keena Marcano MD  PCP: Alvarez Verduzco    Discharging Nurse: Migdalia Méndez [facility] Unit/Room#: 1846/4957-20  Discharging Unit Phone Number: [phone]    Emergency Contact:   Extended Emergency Contact Information  Primary Emergency Contact: Liz Howard Phone: [phone]  Work Phone: [phone]  Mobile Phone: [phone]  Relation: Other    Past Surgical History:  Past Surgical History:   Procedure Laterality Date    BUNIONECTOMY      twice on right side    BUNIONECTOMY Left     CARPAL TUNNEL RELEASE Right     COLONOSCOPY      at age 35    COLONOSCOPY  10/05/2016    polyps-pathology tubular adenoma, and abnormal looking mucosa right colon-pathology-tubular adenoma    COLONOSCOPY N/A 03/30/2018    COLONOSCOPY POLYPECTOMY COLD BIOPSY performed by Jannet Savage MD at [address] [facility]  03/30/2018    Small polyp in the sigmoid colon and excised with biopsy forceps--tubular adenoma    COLONOSCOPY N/A 04/16/2022    COLONOSCOPY POLYPECTOMY performed by Jannet Savage MD at [facility], DIAGNOSTIC      EGD    ESOPHAGOGASTRODUODENOSCOPY  12/29/2022    ESOPHAGOGASTRODUODENOSCOPY N/A 01/03/2023    IR PORT PLACEMENT EQUAL OR GREATER THAN 5 YEARS  04/19/2021    IR PORT PLACEMENT EQUAL OR GREATER THAN 5 YEARS 4/19/2021 [facility] PROC[facility]    IR TIPS INSERTION  12/01/2022    IR TIPS INSERTION 12/1/2022 Arsenio Marina MD [facility] PROC[facility]    KNEE SURGERY Left     cyst removed    NASAL SEPTUM SURGERY      NERVE BLOCK Right 11/23/2020    NERVE BLOCK RIGHT CERVICAL STEROID INJECTION  C3-C6 performed by Angie Nation MD at [address]  01/04/2016    steroid injection C7 T1    OTHER SURGICAL HISTORY  11/21/2016    Bilateral Lumbar CACHORRO L4-L5 injections    OTHER SURGICAL HISTORY  12/19/2016    lumbar steroid injection    OTHER SURGICAL HISTORY  09/28/2018    BILATERAL L5 CACHORRO (N/A Back)    OTHER SURGICAL HISTORY Right 11/23/2020    cervical injection    PAIN MANAGEMENT PROCEDURE Left 07/09/2020    EPIDURAL STEROID INJECTION LEFT L4 L5 performed by Angie Nation MD at [facility] Left 07/20/2020    LEFT L4 L5 EPIDURAL STEROID INJECTION performed by Angie Nation MD at [facility] Bilateral 08/17/2020    LUMBAR FACET BILATERAL L2-L5 performed by Angie Nation MD at [facility] Bilateral 12/07/2020    NERVE BLOCK BILATERAL LUMBAR MEDIAL BRANCH L2-L5 performed by Angie Nation MD at [address] [facility] NEUROPLASTY &/TRANSPOS MEDIAN NRV ROMINA Mcgee Right 08/29/2017    CARPAL TUNNEL RELEASE RIGHT performed by Demarco Jensen MD at [address] &/TRANSPOS MEDIAN NRV CARPAL TUNNE Left 10/31/2017    CARPAL TUNNEL RELEASE performed by Demarco Jensen MD at [facility] [address] AA&/STRD TFRML EPI LUMBAR/SACRAL 1 LEVEL Bilateral 09/06/2018    BILATERAL L5 CACHORRO performed by Fracisco Vickers MD at [facility] [address]67 AA&/STRD TFRML EPI LUMBAR/SACRAL 1 LEVEL N/A 09/28/2018    BILATERAL L5 CACHORRO performed by Fracisco Vickers MD at [facility] 12/29/2020    EGD BIOPSY performed by Sophie Soliman MD at [address] [place] [address] 02/02/2021    EGD BIOPSY and spot marking performed by Sonja Nava MD at [address] [place] [address] 02/12/2021    ENDOSCOPIC ULTRASOUND, EGD performed by Alba Najera MD at [place] [address]  02/12/2021    EGD DIAGNOSTIC ONLY performed by Alba Najera MD at [facility] 1 08/31/2021    EGD BIOPSY performed by Sonja Nava MD at [address] [place] [address] 01/21/2022    EGD BIOPSY performed by Sonja Nava MD at [address] [place] [address] N/A 04/15/2022    EGD ESOPHAGOGASTRODUODENOSCOPY performed by Sophie Soliman MD at [facility] 06/06/2022    EGD BAND LIGATION performed by Elsie Snellen, MD at [facility] N/A 06/09/2022    EGD ESOPHAGOGASTRODUODENOSCOPY performed by Elsie Snellen, MD at [facility] N/A 09/14/2022    EGD ESOPHAGOGASTRODUODENOSCOPY ENDOSCOPIC APC AT GE JUNCTION performed by Armen Roper MD at [facility] 10/19/2022    EGD BIOPSY performed by Sonja Nava MD at [facility] 10/31/2022    EGD with APC performed by [place] [facility] Erica Cooper MD at [address]  12/29/2022    EGD ESOPHAGOGASTRODUODENOSCOPY performed by Sary Yoder MD at [facility] N/A 1/3/2023    EGD ESOPHAGOGASTRODUODENOSCOPY performed by Gabo Em MD at [facility]         Immunization History:   Immunization History   Administered Date(s) Administered    COVID-19, MODERNA BLUE border, Primary or Immunocompromised, (age 12y+), IM, 100 mcg/0.5mL 05/20/2021, 06/22/2021    Hepatitis A 09/20/2016, 03/29/2017    Hepatitis B (Recombivax HB) 09/20/2016, 10/19/2016, 03/29/2017    Influenza 11/29/2012    Influenza, AFLURIA (age 1 yrs+), FLUZONE, (age 10 mo+), MDV, 0.5mL 09/13/2017, 01/24/2019    Influenza, FLUARIX, FLULAVAL, Colan Weaver (age 10 mo+) AND AFLURIA, (age 1 y+), PF, 0.5mL 09/13/2016, 12/30/2020, 12/23/2021    PPD Test 07/25/2016, 04/23/2022    Pneumococcal Polysaccharide (Iegwygsed45) 11/29/2012, 07/25/2016    Tdap (Boostrix, Adacel) 04/06/2017, 06/15/2022       Active Problems:  Patient Active Problem List   Diagnosis Code    Back pain, chronic M54.9, G89.29    Hearing difficulty H91.90    GERD (gastroesophageal reflux disease) K21.9    Cervical radicular pain M54.12    Hypomagnesemia E83.42    Chronic viral hepatitis B without delta agent and without coma (HCC) B18.1    Calculus of gallbladder without cholecystitis K80.20    Hep C w/o coma, chronic (HCC) B18.2    Fatty liver K76.0    Psychophysiologic insomnia F51.04    Cirrhosis (HCC) K74.60    Decompensation of cirrhosis of liver (HCC) K72.90, K74.60    Vertebrogenic low back pain M54.51    DDD (degenerative disc disease), lumbar M51.36    Depression F32. A    Tubular adenoma of colon D12.6    History of colon polyps Z86.010    Gynecomastia, male N62    Lumbar radiculitis M54.16    Lumbar disc herniation M51.26    Tinnitus H93.19    Eustachian tube dysfunction H69.80    Ganglion cyst M67.40    Carpal tunnel syndrome of right wrist G56.01    History of hepatitis C Z86.19    Vitamin D deficiency E55.9    Pure hypercholesterolemia E78.00    Acute hypokalemia E87.6    Essential hypertension I10    Recurrent major depressive disorder in partial remission (HCC) F33.41    S/P epidural steroid injection Z92.241    Elevated LFTs R79.89    Seasonal allergies J30.2    Lumbar facet arthropathy M47.816    Cervical facet syndrome M47.812    Thrombocytopenia (HCC) D69.6    Hepatitis C virus infection resolved after antiviral drug therapy Z86.19    Alcohol abuse F10.10    Altered mental status R41.82    Hypocalcemia E83.51    Hypophosphatemia E83.39    Malignant neoplasm of lower third of esophagus (HCC) C15.5    COVID-19 U07.1    Anxiety F41.9    Current smoker F17.200    Severe comorbid illness R69    Gait instability R26.81    Abnormal findings on diagnostic imaging of spine R93.7    Cervical spinal stenosis M48.02    Spinal stenosis of lumbar region with neurogenic claudication M48.062    Low hemoglobin D64.9    Symptomatic anemia, microcytic, acute D64.9    Hypotension I95.9    Former smoker, 50+ pack years, quit 2016 Z87.891    HLD (hyperlipidemia) E78.5    Esophageal adenocarcinoma (HCC) C15.9    Anemia D64.9    Acute kidney injury ([facility] [address] N17.9    Ascites due to alcoholic cirrhosis ([facility] [address] I72.19    Shortness of breath R06.02    Drop in hemoglobin R71.0    Esophageal polyp K22.81    Acute kidney failure, unspecified (HCC) N17.9    Muscle weakness (generalized) M62.81    Other abnormalities of gait and mobility R26.89    GI bleed K92.2    Goals of care, counseling/discussion Z71.89    ACP (advance care planning) Z71.89    Palliative care encounter Z51.5    S/P TIPS (transjugular intrahepatic portosystemic shunt) O03.653    Lezama's esophagus with dysplasia K22.719    Mastoid disorder, bilateral H74.93    Acute deep vein thrombosis (DVT) of brachial vein of right upper extremity (HCC) I82.621    Chronic hepatitis C without hepatic coma (HCC) B18.2 Swelling of joint of upper arm, right M25.421    Anasarca R60.1       Isolation/Infection:   Isolation            No Isolation          Patient Infection Status       Infection Onset Added Last Indicated Last Indicated By Review Planned Expiration Resolved Resolved By    None active    Resolved    COVID-19 (Rule Out) 22 COVID-19 & Influenza Combo (Ordered)   22 Rule-Out Test Resulted    COVID-19 (Rule Out) 22 COVID-19, Rapid (Ordered)   22 Rule-Out Test Resulted    COVID-19 (Rule Out) 22 COVID-19 & Influenza Combo (Ordered)   22 Rule-Out Test Resulted    COVID-19 21 COVID-19   21     COVID-19 (Rule Out) 21 COVID-19 (Ordered)   21 Rule-Out Test Resulted    COVID-19 (Rule Out) 20 COVID-19 (Ordered)   20 Rule-Out Test Resulted    COVID-19 (Rule Out) 20 COVID-19 (Ordered)   20 Rule-Out Test Resulted            Nurse Assessment:  Last Vital Signs: /67   Pulse 100   Temp 98.4 °F (36.9 °C) (Oral)   Resp 18   Ht 5' 10\" (1.778 m)   Wt 210 lb (95.3 kg)   SpO2 94%   BMI 30.13 kg/m²     Last documented pain score (0-10 scale): Pain Level: 8  Last Weight:   Wt Readings from Last 1 Encounters:   23 210 lb (95.3 kg)     Mental Status:  oriented and alert    IV Access:  - mediport    Nursing Mobility/ADLs:  Walking   Independent  Transfer  Independent  Bathing  Independent  Dressing  Independent  Toileting  Independent  Feeding  Independent  Med Admin  Independent  Med Delivery   none    Wound Care Documentation and Therapy:        Elimination:  Continence:    Bowel: Yes  Bladder: Yes  Urinary Catheter: None   Colostomy/Ileostomy/Ileal Conduit: No       Date of Last BM: n/a    Intake/Output Summary (Last 24 hours) at 2023 1025  Last data filed at 2023 0855  Gross per 24 hour   Intake -- Output 1775 ml   Net -1775 ml     I/O last 3 completed shifts: In: [address]: Nae [Urine:1325]    Safety Concerns: At Risk for Falls    Impairments/Disabilities:      None    Nutrition Therapy:  Current Nutrition Therapy:   - Oral Diet:  Low Sodium (2gm)    Routes of Feeding: Oral  Liquids: Thin Liquids  Daily Fluid Restriction: no  Last Modified Barium Swallow with Video (Video Swallowing Test): not done    Treatments at the Time of Hospital Discharge:   Respiratory Treatments: none  Oxygen Therapy:  is not on home oxygen therapy. Ventilator:    - No ventilator support    Rehab Therapies: none  Weight Bearing Status/Restrictions: No weight bearing restrictions  Other Medical Equipment (for information only, NOT a DME order):  n/a  Other Treatments: Skilled Nursing assessment and monitoring. Medication education and monitoring per protocol. Patient's personal belongings (please select all that are sent with patient):  None    RN SIGNATURE:  Electronically signed by Peggy Garcia RN on 1/28/23 at 9:47 AM EST    CASE MANAGEMENT/SOCIAL WORK SECTION    Inpatient Status Date: 1/24/2023    Readmission Risk Assessment Score:  Readmission Risk              Risk of Unplanned Readmission:  37           Discharging to [facility] [address], [address]  P: [phone]   F: [phone]     Dialysis Facility (if applicable)   Name:  Address:  Dialysis Schedule:  Phone:  Fax:    / signature: Electronically signed by Gil Damon RN on 1/28/23 at 9:56 AM EST    PHYSICIAN SECTION    Prognosis: Fair    Condition at Discharge: Stable    Rehab Potential (if transferring to Rehab):  Fair    Recommended Labs or Other Treatments After Discharge:     Physician Certification: I certify the above information and transfer of Toney Mccain  is necessary for the continuing treatment of the diagnosis listed and that he requires Home Care for less 30 days.     Update Admission H&P: No change in H&P    PHYSICIAN SIGNATURE:  Electronically signed by Nuha Navas MD on 1/26/23 at 10:25 AM EST

## 2023-01-28 NOTE — PROGRESS NOTES
Dr. Cerda notified patients tele is flipping between sinus rhythm and a fib. Dr. Cerda to place orders.

## 2023-01-30 ENCOUNTER — CARE COORDINATION (OUTPATIENT)
Dept: CASE MANAGEMENT | Age: 63
End: 2023-01-30

## 2023-01-30 ENCOUNTER — HOSPITAL ENCOUNTER (OUTPATIENT)
Age: 63
Discharge: HOME | End: 2023-01-30
Payer: MEDICARE

## 2023-01-30 ENCOUNTER — HOSPITAL ENCOUNTER (OUTPATIENT)
Dept: NON INVASIVE DIAGNOSTICS | Age: 63
Discharge: HOME | End: 2023-01-30
Payer: MEDICARE

## 2023-01-30 DIAGNOSIS — I31.39 PERICARDIAL EFFUSION: ICD-10-CM

## 2023-01-30 DIAGNOSIS — R94.31 ABNORMAL ELECTROCARDIOGRAM (ECG) (EKG): ICD-10-CM

## 2023-01-30 LAB
HCT VFR BLD CALC: 27.3 % (ref 41–53)
HEMOGLOBIN: 8.3 G/DL (ref 13.5–17.5)
LV EF: 58 %
LVEF MODALITY: NORMAL

## 2023-01-30 PROCEDURE — 85018 HEMOGLOBIN: CPT

## 2023-01-30 PROCEDURE — 93306 TTE W/DOPPLER COMPLETE: CPT

## 2023-01-30 PROCEDURE — 36415 COLL VENOUS BLD VENIPUNCTURE: CPT

## 2023-01-30 PROCEDURE — 85014 HEMATOCRIT: CPT

## 2023-01-30 NOTE — CARE COORDINATION
Care Transitions Outreach Attempt    Call within 2 business days of discharge: Yes   Attempted to reach patient for transitions of care follow up. Unable to reach patient.    Patient: Frank Lisa Patient : 1960 MRN: [id number]    Last Discharge [facility] Facility       Date Complaint Diagnosis Description Type Department Provider    23 Abnormal Lab Anemia, unspecified type ED to Hosp-Admission (Discharged) (ADMITTED) CZ FAYE Abad MD; Sigifredo Sheldon...          # 1 attempt-Attempted initial 24 hour hospital follow up call. Left a Hipaa compliant message with name and call back information. Requested return call to [phone].     Writer contacted [facility] spoke to MARY Vickers order was received, should be seeing patient today or tomorrow//JU      Was this an external facility discharge? No Discharge Facility: [facility]    Noted following upcoming appointments from discharge chart review:   [facility] follow up appointment(s):   Future Appointments   Date Time Provider Department Center   2023  2:30 PM [facility] ECHO RM 1 [facility]   2023 11:00 AM Jana England MD [facility]   2023  2:45 PM Augusto Cordova MD [facility] GI [facility]   2/3/2023  1:00 PM [facility] IR  [facility] SPECIAL [facility] Radiolog   2/10/2023  1:00 PM [facility] IR  [facility] SPECIAL [facility] Radiolog   2023  1:15 PM Garrison Durán MD [facility]   2023  1:00 PM [facility] IR  [facility] SPECIAL [facility] Radiolog   2023  1:00 PM [facility] IR  [facility] Radiolog   3/3/2023  1:00 PM ST[facility] IR  [facility] SPECIAL [facility] Radiolog   3/10/2023  1:00 PM STC IR  [facility] SPECIAL [facility] Radiolog   3/17/2023  1:00 PM STC IR  [facility] Radiolog   3/24/2023  1:00 PM ST[facility] IR  [facility] SPECIAL [facility] Radiolog   3/31/2023  1:00 PM STC IR  [facility] SPECIAL [facility] Radiolog   2023  1:00 PM STC IR  [facility] SPECIAL [facility] Radiolog   2023  1:00 PM STC IR  [facility] SPECIAL [facility] Radiolog   2023  1:00 PM STC IR RM  [address] [address] STC Radiolog   4/28/2023  1:00 PM STC IR  [facility] SPECIAL [facility]C Radiolog   5/5/2023  1:00 PM STC IR  [facility] SPECIAL [facility] Radiolog   5/12/2023  1:00 PM STC IR  [facility] SPECIAL [facility] Radiolog   5/19/2023  1:00 PM STC IR  [facility] SPECIAL [facility] Radiolog   5/26/2023  1:00 PM [facility] IR  [facility] SPECIAL [facility] Radiolog   6/2/2023  1:00 PM STC IR  [facility] SPECIAL [facility] Radiolog   6/9/2023  1:00 PM [facility]C IR  [facility] Radiolog   6/16/2023  1:00 PM STC IR  [facility] SPECIAL [facility] Radiolog   6/23/2023  1:00 PM STC IR  [facility] SPECIAL STC Radiolog     Non-[facility] follow up appointment(s):

## 2023-01-31 ENCOUNTER — CARE COORDINATION (OUTPATIENT)
Dept: CASE MANAGEMENT | Age: 63
End: 2023-01-31

## 2023-01-31 DIAGNOSIS — K70.31 ASCITES DUE TO ALCOHOLIC CIRRHOSIS (HCC): Primary | ICD-10-CM

## 2023-01-31 LAB
EKG ATRIAL RATE: 92 BPM
EKG P AXIS: 58 DEGREES
EKG P-R INTERVAL: 146 MS
EKG Q-T INTERVAL: 398 MS
EKG QRS DURATION: 76 MS
EKG QTC CALCULATION (BAZETT): 492 MS
EKG R AXIS: 47 DEGREES
EKG T AXIS: 22 DEGREES
EKG VENTRICULAR RATE: 92 BPM

## 2023-01-31 PROCEDURE — 1111F DSCHRG MED/CURRENT MED MERGE: CPT | Performed by: PHYSICIAN ASSISTANT

## 2023-01-31 PROCEDURE — 93010 ELECTROCARDIOGRAM REPORT: CPT | Performed by: INTERNAL MEDICINE

## 2023-01-31 NOTE — CARE COORDINATION
Care Transitions Outreach Attempt #2    Call within 2 business days of discharge: Yes   Attempt #2 to reach patient for transitions of care follow up. Unable to reach patient. Left  requesting call back. [facility] previously confirmed start of care. CTN sign off if no return call received. Patient: Xavier Frey Patient : 1960 MRN: [id number]    Last Discharge [address]       Date Complaint Diagnosis Description Type Department Provider    23 Abnormal Lab Anemia, unspecified type ED to Hosp-Admission (Discharged) (ADMITTED) Swetha Danielle MD; Talon Fagan. .. Was this an external facility discharge?  No Discharge Facility: [facility]    Noted following upcoming appointments from discharge chart review:   [facility] follow up appointment(s):   Future Appointments   Date Time Provider Carolyn English   2023 11:00 AM Maryjane Skelton MD [address]   2023  2:45 PM Gwen Loyd MD [facility]   2/3/2023  1:00 PM ST[facility] IR  [facility] SPECIAL [facility] Radiolog   2/10/2023  1:00 PM STC IR  [facility] SPECIAL [facility] Radiolog   2023  1:15 PM Apolonia Lyn MD [facility]   2023  1:00 PM STC IR  [facility] SPECIAL [facility] Radiolog   2023  1:00 PM [facility] IR  [facility] Radiolog   3/3/2023  1:00 PM STC IR  [facility] SPECIAL [facility] Radiolog   3/10/2023  1:00 PM STC IR  [facility] Radiolog   3/17/2023  1:00 PM STC IR  [facility] Radiolog   3/24/2023  1:00 PM STC IR  [facility] Radiolog   3/31/2023  1:00 PM [facility]C IR  [facility] Radiolog   2023  1:00 PM [facility]C IR  [facility] Radiolog   2023  1:00 PM [facility] IR  [facility] Rad[facility]   2023  1:00 PM STC IR  [facility] Radiolog   2023  1:00 PM STC IR  [facility] Radiolog   2023  1:00 PM STC IR  [facility] SPECIAL [facility] Radiolog   2023  1:00 PM STC IR  [facility] SPECIAL [facility] Radiolog   2023 1:00 PM STC IR  [facility] SPECIAL [facility] Radiolog   5/26/2023  1:00 PM STC IR  [facility] SPECIAL [facility] Radiolog   6/2/2023  1:00 PM STC IR  [facility] SPECIAL [facility] Radiolog   6/9/2023  1:00 PM STC IR  [facility] SPECIAL [facility] Radiolog   6/16/2023  1:00 PM STC IR  [facility] SPECIAL STC Radiolog   6/23/2023  1:00 PM STC IR [address], RN BSN   [facility] Nurse  [phone]

## 2023-01-31 NOTE — CARE COORDINATION
[facility] Care Transitions Initial Follow Up Call    Call within 2 business days of discharge: Yes    Care Transition Nurse contacted the patient by telephone to perform post hospital discharge assessment. Verified name and  with patient as identifiers. Provided introduction to self, and explanation of the Care Transition Nurse role. Patient: Fred Rosenberg Patient : 1960   MRN: [id number]  Reason for Admission: Symptomatic anemia  Discharge Date: 23 RARS: Readmission Risk Score: 32.7      Last Discharge  Street       Date Complaint Diagnosis Description Type Department Provider    23 Abnormal Lab Anemia, unspecified type ED to Hosp-Admission (Discharged) (ADMITTED) Meseret Jaimes MD; Rick Epps. .. Was this an external facility discharge? No Discharge Facility: [facility]    Challenges to be reviewed by the provider   Additional needs identified to be addressed with provider: No  none               Method of communication with provider: none. Spoke to Urvashi Harrell for initial transitions call. Pt was admitted to [facility] on 23 for  management of Symptomatic anemia, presented to ER with Abnormal Lab (Pt reports a hemoglobin of 6.0). On admission hemoglobin was 5.7 patient received transfusion and paracentesis  Patient also has hyponatremia secondary to chronic liver disease and Lasix. STated he is doing OK since discharge, has [facility] visits. Pt had ECHO yesterday, awaiting results. Pt unable to  new medication Carafate, stated [facility] was out, will  from another [address] [place] [address] today. Discussed purpose of Carafate, advised to start medication today, take 4 times a day as directed. Discussed importance of avoiding alcohol intake. Reviewed mediations and upcoming apps. Pt drives himself, will go to consult for palliative care, concerned about palliative care being \"next step to grave\".  Discussed palliative care purpose as more of managing chronic diseases better to maintain optimal health. Pt has GI appt tomorrow as well, paracentesis on 2/3/22 (has weekly appt). Agreeable to transitions calls. Care Transition Nurse reviewed discharge instructions with patient who verbalized understanding. The patient was given an opportunity to ask questions and does not have any further questions or concerns at this time. Were discharge instructions available to patient? Yes. Reviewed appropriate site of care based on symptoms and resources available to patient including: PCP  Specialist  Home health  When to call [address] The patient agrees to contact the PCP office for questions related to their healthcare. Advance Care Planning:   Does patient have an Advance Directive: reviewed and current. Medication reconciliation was performed with patient, who verbalizes understanding of administration of home medications.  Medications reviewed, 1111F entered: no    Was patient discharged with a pulse oximeter? no    Non-face-to-face services provided:  Obtained and reviewed discharge summary and/or continuity of care documents    Offered patient enrollment in the Remote Patient Monitoring (RPM) program for in-home monitoring: Patient is not eligible for RPM program.    Care Transitions 24 Hour Call    Do you have a copy of your discharge instructions?: Yes  Do you have all of your prescriptions and are they filled?: No  Have you been contacted by a [address]?: No  Have you scheduled your follow up appointment?: Yes  How are you going to get to your appointment?: Car - drive self  Post Acute Services: [address] (Comment: Vernell)  Do you feel like you have everything you need to keep you well at home?: Yes  Care Transitions Interventions     Other Therapy Services: Completed      Other Services: Completed   Disease Association: Completed Palliative Care: Completed              Follow Up  Future Appointments   Date Time Provider Carolyn English 2/1/2023 11:00 AM Sparkle Acuña MD [address]   2/1/2023  2:45 PM Kristy Cooper MD [facility]   2/3/2023  1:00 PM [facility]  [facility]   2/10/2023  1:00 PM [facility] IR  [facility]   2/16/2023  1:15 PM Celsa Steward MD [facility]   2/17/2023  1:00 PM [facility] IR  [facility]   2/24/2023  1:00 PM [facility] IR  [facility]   3/3/2023  1:00 PM [facility] IR  [facility]   3/10/2023  1:00 PM [facility] IR  [facility]   3/17/2023  1:00 PM [facility] IR  [facility]   3/24/2023  1:00 PM [facility] IR  [facility]   3/31/2023  1:00 PM [facility] IR  [facility]   4/7/2023  1:00 PM [facility] IR  [facility]   4/14/2023  1:00 PM [facility] IR  [facility]   4/21/2023  1:00 PM [facility] IR  [facility]   4/28/2023  1:00 PM [facility] IR  [facility]   5/5/2023  1:00 PM [facility] IR  [facility]   5/12/2023  1:00 PM [facility] IR  [facility]   5/19/2023  1:00 PM [facility] IR  [facility]   5/26/2023  1:00 PM [facility] IR  [facility]   6/2/2023  1:00 PM [facility] IR  [facility]   6/9/2023  1:00 PM [facility] IR  [facility]   6/16/2023  1:00 PM [facility] IR  [facility] Radi[facility]   6/23/2023  1:00 PM [facility] [place] [id number] Doug Piedad [facility]       Care Transition Nurse provided contact information. Plan for follow-up call in 5-7 days based on severity of symptoms and risk factors. Plan for next call: symptom management-did he  carafate? Abstain from alcohol? HHCvisits  follow-up appointment-GI, Palliative care, paracentesis review. Changes?     Malvin Alexandra RN

## 2023-02-01 ENCOUNTER — INITIAL CONSULT (OUTPATIENT)
Dept: PALLATIVE CARE | Age: 63
End: 2023-02-01
Payer: MEDICARE

## 2023-02-01 ENCOUNTER — OFFICE VISIT (OUTPATIENT)
Dept: GASTROENTEROLOGY | Age: 63
End: 2023-02-01

## 2023-02-01 VITALS
BODY MASS INDEX: 28.94 KG/M2 | WEIGHT: 206.7 LBS | HEART RATE: 87 BPM | DIASTOLIC BLOOD PRESSURE: 67 MMHG | RESPIRATION RATE: 20 BRPM | SYSTOLIC BLOOD PRESSURE: 136 MMHG | HEIGHT: 71 IN | TEMPERATURE: 97.7 F

## 2023-02-01 VITALS
HEIGHT: 71 IN | SYSTOLIC BLOOD PRESSURE: 117 MMHG | WEIGHT: 208 LBS | BODY MASS INDEX: 29.12 KG/M2 | DIASTOLIC BLOOD PRESSURE: 62 MMHG

## 2023-02-01 DIAGNOSIS — K74.69 DECOMPENSATED LIVER DISEASE (HCC): ICD-10-CM

## 2023-02-01 DIAGNOSIS — F10.10 ALCOHOL ABUSE: ICD-10-CM

## 2023-02-01 DIAGNOSIS — K76.6 PORTAL HYPERTENSION (HCC): Primary | ICD-10-CM

## 2023-02-01 DIAGNOSIS — K70.31 ALCOHOLIC CIRRHOSIS OF LIVER WITH ASCITES (HCC): Primary | ICD-10-CM

## 2023-02-01 DIAGNOSIS — Z87.19 HISTORY OF MELENA: ICD-10-CM

## 2023-02-01 DIAGNOSIS — Z85.01 HISTORY OF MALIGNANT NEOPLASM OF ESOPHAGUS: ICD-10-CM

## 2023-02-01 PROCEDURE — 3078F DIAST BP <80 MM HG: CPT | Performed by: INTERNAL MEDICINE

## 2023-02-01 PROCEDURE — 99202 OFFICE O/P NEW SF 15 MIN: CPT | Performed by: INTERNAL MEDICINE

## 2023-02-01 PROCEDURE — 3074F SYST BP LT 130 MM HG: CPT | Performed by: INTERNAL MEDICINE

## 2023-02-01 ASSESSMENT — ENCOUNTER SYMPTOMS
SHORTNESS OF BREATH: 1
ABDOMINAL DISTENTION: 1
ALLERGIC/IMMUNOLOGIC NEGATIVE: 1
BACK PAIN: 1

## 2023-02-01 NOTE — PROGRESS NOTES
Palliative Care Clinic Consult    NAME:  Nancy Monsalve  MEDICAL RECORD NUMBER:  [id number]  AGE: 60 y.o. GENDER: male  : 1960  TODAY'S DATE:  2023    Reasons for Consultation:    Assist in symptom and/or pain management  Provision of information regarding PC and/or hospice philosophies  Complex, time-intensive communication and interdisciplinary psychosocial support  Clarification of goals of care and/or assistance with difficult decision-making  Guidance in regards to resources and transition(s)  Facilitate communications  Recommendations for the above    Members of PC team contributing to this consultation are :  Dr. Rashel Lundberg    History of Present Illness     Mr. Evie Cortez is a 60year old  gentleman referred to our palliative care clinic by myself after meeting the patient during a recent hospitalization in late December / early January. He has a very complicated GI history with a history of Lezama's esophagus/esophageal adenocarcinoma status post chemoradiation, decompensated alcoholic cirrhosis, and recurrent GI bleeding. He underwent a TIPS revision on  and EGD on 1/3/2022 which revealed grade 1-2 varices in the distal esophagus without stigmata of bleeding with few AVMs in the cardia and mild diffuse portal hypertensive gastropathy. His ex-wife Vickie Rosa (who is the patient's healthcare power of ) was present during this hospitalization and we were able to meet and discuss goals of care for the patient with her. The patient's mentation waxed and waned during that hospitalization and it was truly felt that he was unaware of how significant his cirrhosis is. He continues to drink alcohol. He was discharged home on . He has undergone weekly paracentesis since that time but was re-admitted to the hospital on  at [facility] due to abnormal labs (hgb 6.).  He underwent repeat EGD on  which revealed diffuse oozing of blood the telangiectasia-like lesions at the GE junction with fresh blood. He underwent cauterization at that time. He also received PRBCs. He was discharged home. After introductions, we explained the reason for referral to our palliative care clinic to the patient. The patient looked quite poor and had significant confusion and looked quite ill during my encounters with him at [facility]. I explained that the reason for our referral was to discuss the severity of his liver disease and explain that unless he stops alcohol use and starts his regimen of medications that he will likely succumb to his disease sooner than later. The patient explained to me that he has no recollection of meeting me at [facility] earlier this month. His ex-wife, Eugenio Cabrera does recall my visits. The patient states to us that he has been taking his medications and that his thinking is much clearer than previously. He notes that he was having issues with the carafate that was prescribed recently, but that he picked this up yesterday. He notes that previously he was having 10 L of ascitic fluid drained from his paracentesis, but now they are only taking off a couple of liters. The patient does report that he has quite alcohol intake. Unfortunately, his alcohol level was over 250 during his hospitalization last week. The patient has his POA paperwork in order. His ex-wife is his WALDEMAR. He also notes that his previous living will stated that he was a DNR-CCA. However, we had a long discussion regarding this, as he initially said that he would never want resuscitative measures. However, after discussing further, it appears that he would want initial resuscitative efforts to be performed, but that he would not want a prolonged hospital course on mechanical ventilation.     PAST MEDICAL HISTORY      Diagnosis Date    Adenocarcinoma in a polyp ([facility]     Alcoholic cirrhosis of liver with ascites ([facility]     Anemia 04/13/2022    Anxiety Arthritis     Back pain, chronic     dr. Miguel Angel Yuan, orthopedic, every 3-4 months, gets steroid injection    Lezama esophagus     Bleeding gastric varices 12/29/2022    BPH (benign prostatic hypertrophy)     Cholelithiasis     Cirrhosis ([address]     COVID-19 12/2020    pt reports he had a positive test while at [facility] in 2020, was asymptomatic    COVID-19 vaccine series completed 5/20/2021, 6/22/2021    Moderna 5/20/2021, 6/22/2021    DDD (degenerative disc disease), lumbar     Depression     Esophageal cancer ([address]     INVASIVE ADENOCARCINOMA ARISING IN TUBULAR ADENOMA WITH HIGH GRADE DYSPLASIA, ASSOCIATED WITH FOCAL INTESTINAL METAPLASIA     Esophageal varices ([address]     Fatty liver     GERD (gastroesophageal reflux disease)     GI bleed     Hep C w/o coma, chronic ([address]     History of alcohol abuse     6-12 beers a day; quit drinking 2019    History of blood transfusion     History of colon polyps 2016    History of tobacco abuse     [place] (hard of hearing)     Hyperlipidemia     Hypertension     Hyponatremia 07/20/2016    Hypotension 12/20/2021    Mastoid disorder, bilateral 12/31/2022    biLateral mastoid effusion    PONV (postoperative nausea and vomiting)     Port-A-Cath in place     right upper chest    Portal hypertension (HCC)     Sciatica     Secondary esophageal varices ([address] 06/07/2022    Shortness of breath     Spinal stenosis     Stomach ulcer     hx of    Thrombocytopenia ([address] 12/23/2020    Tubular adenoma of colon 2016, 2018    Vitamin D deficiency     Wears glasses        PAST SURGICAL HISTORY  Past Surgical History:   Procedure Laterality Date    BUNIONECTOMY      twice on right side    BUNIONECTOMY Left     CARPAL TUNNEL RELEASE Right     COLONOSCOPY      at age 35    COLONOSCOPY  10/05/2016    polyps-pathology tubular adenoma, and abnormal looking mucosa right colon-pathology-tubular adenoma    COLONOSCOPY N/A 03/30/2018    COLONOSCOPY POLYPECTOMY COLD BIOPSY performed by Aleksandr Magaña MD at [facility]    COLONOSCOPY  03/30/2018    Small polyp in the sigmoid colon and excised with biopsy forceps--tubular adenoma    COLONOSCOPY N/A 04/16/2022    COLONOSCOPY POLYPECTOMY performed by Jessica Carver MD at [facility], DIAGNOSTIC      EGD    ESOPHAGOGASTRODUODENOSCOPY  12/29/2022    ESOPHAGOGASTRODUODENOSCOPY N/A 01/03/2023    IR PORT PLACEMENT EQUAL OR GREATER THAN 5 YEARS  04/19/2021    IR PORT PLACEMENT EQUAL OR GREATER THAN 5 YEARS 4/19/2021 [facility]    IR TIPS INSERTION  12/01/2022    IR TIPS INSERTION 12/1/2022 Batsheva Venegas MD [facility]    KNEE SURGERY Left     cyst removed    NASAL SEPTUM SURGERY      NERVE BLOCK Right 11/23/2020    NERVE BLOCK RIGHT CERVICAL STEROID INJECTION  C3-C6 performed by Eduardo Culp MD at [address] [place] [address]  01/04/2016    steroid injection C7 T1    OTHER SURGICAL HISTORY  11/21/2016    Bilateral Lumbar CACHORRO L4-L5 injections    OTHER SURGICAL HISTORY  12/19/2016    lumbar steroid injection    OTHER SURGICAL HISTORY  09/28/2018    BILATERAL L5 CACHORRO (N/A Back)    OTHER SURGICAL HISTORY Right 11/23/2020    cervical injection    PAIN MANAGEMENT PROCEDURE Left 07/09/2020    EPIDURAL STEROID INJECTION LEFT L4 L5 performed by Eduardo Culp MD at [facility] Left 07/20/2020    LEFT L4 L5 EPIDURAL STEROID INJECTION performed by Eduardo Culp MD at [facility] Bilateral 08/17/2020    LUMBAR FACET BILATERAL L2-L5 performed by Eduardo Culp MD at [facility] Bilateral 12/07/2020    NERVE BLOCK BILATERAL LUMBAR MEDIAL BRANCH L2-L5 performed by Eduardo Culp MD at [address] [employer] NEUROPLASTY &/TRANSPOS MEDIAN NRV CARPAL Sabrina Jaya Right 08/29/2017    CARPAL TUNNEL RELEASE RIGHT performed by Charlie Pride MD at [address] &/TRANSPOS MEDIAN NRV CARPAL TUNNE Left 10/31/2017    CARPAL TUNNEL RELEASE performed by Joy Rivas MD at [place] [address] AA&/STRD TFRML EPI LUMBAR/SACRAL 1 LEVEL Bilateral 09/06/2018    BILATERAL L5 CACHORRO performed by Kamilla Villa MD at [place] [id number] AA&/STRD TFRML EPI LUMBAR/SACRAL 1 LEVEL N/A 09/28/2018    BILATERAL L5 CACHORRO performed by Kamilla Villa MD at [address] [place] [address] 12/29/2020    EGD BIOPSY performed by Sean Reinoso MD at [address] [place] [address] 02/02/2021    EGD BIOPSY and spot marking performed by Aleksandr Magaña MD at [address] [place] [address] 02/12/2021    ENDOSCOPIC ULTRASOUND, EGD performed by Kameron Garcia MD at [place] [address]  02/12/2021    EGD DIAGNOSTIC ONLY performed by Kameron Garcia MD at [place] [address] N/A 08/31/2021    EGD BIOPSY performed by Aleksandr Magaña MD at [address] [place] [address] 01/21/2022    EGD BIOPSY performed by Aleksandr Magaña MD at [address] [place] [address] N/A 04/15/2022    EGD ESOPHAGOGASTRODUODENOSCOPY performed by Sean Reinoso MD at [address] [place] [address] 06/06/2022    EGD BAND LIGATION performed by Catalina Kovacs MD at [address] [place] [address] N/A 06/09/2022    EGD ESOPHAGOGASTRODUODENOSCOPY performed by Catalina Kovacs MD at [address] [place] [address] N/A 09/14/2022    EGD ESOPHAGOGASTRODUODENOSCOPY ENDOSCOPIC APC AT GE JUNCTION performed by Nahid Melvin MD at [address] [place] [address] 10/19/2022    EGD BIOPSY performed by Aleksandr Magaña MD at [address] [place] [address] 10/31/2022    EGD with APC performed by Aleksandr Magaña MD at [address] [place] [address]  12/29/2022    EGD ESOPHAGOGASTRODUODENOSCOPY performed by Kameron Garcia MD at [address] [place] [address] ENDOSCOPY N/A 1/3/2023    EGD ESOPHAGOGASTRODUODENOSCOPY performed by Arsen Russo MD at [facility]    UPPER GASTROINTESTINAL ENDOSCOPY N/A 2023    EGD CONTROL HEMORRHAGE performed by Augusto Cordova MD at [facility]O    WISDOM TOOTH EXTRACTION         SOCIAL HISTORY  Social History     Tobacco Use    Smoking status: Former     Packs/day: 1.00     Years: 45.00     Pack years: 45.00     Types: Cigarettes     Quit date: 2017     Years since quittin.0    Smokeless tobacco: Never   Vaping Use    Vaping Use: Never used   Substance Use Topics    Alcohol use: Not Currently     Comment: Quit alcohol in 2019- heavier drinking prior to quitting    Drug use: Not Currently     Frequency: 1.0 times per week     Types: Cocaine     Comment: Cocaine- stopped spring 2016       ALLERGIES  No Known Allergies    Home Medications  Current Outpatient Medications   Medication Sig Dispense Refill    sucralfate (CARAFATE) 1 GM tablet Take 1 tablet by mouth 4 times daily 120 tablet 3    lactulose (CHRONULAC) 10 GM/15ML solution take 30 milliliters by mouth three times a day 473 mL 1    furosemide (LASIX) 40 MG tablet Take 1 tablet by mouth in the morning and 1 tablet in the evening. 60 tablet 3    spironolactone (ALDACTONE) 25 MG tablet Take 1 tablet by mouth daily 30 tablet 3    pantoprazole (PROTONIX) 40 MG tablet Take 40 mg by mouth daily      FEROSUL 325 (65 Fe) MG tablet take 1 tablet by mouth twice a day 60 tablet 5    atorvastatin (LIPITOR) 20 MG tablet Take 1 tablet by mouth nightly 30 tablet 3     No current facility-administered medications for this visit.        Data         /67 (Site: Left Upper Arm, Position: Sitting)   Pulse 87   Temp 97.7 °F (36.5 °C) (Oral)   Resp 20   Ht 5' 11\" (1.803 m)   Wt 206 lb 11.2 oz (93.8 kg)   BMI 28.83 kg/m²     Wt Readings from Last 3 Encounters:   23 206 lb 11.2 oz (93.8 kg)   23 209 lb 7 oz (95 kg)   23 214 lb 11.2 oz (97.4 kg)        Code Status:   Code Status: Prior     ADVANCED CARE PLANNING:  Patient has capacity for medical decisions: yes  Health Care Power of : yes - Ex-wife Anette  Living Will: yes     Personal, Social, and Family History  Marital Status:   Living situation: alone  Does patient understand diagnosis/treatment? yes  Does caregiver understand diagnosis/treatment? yes      [place]ARRS: Controlled Substances Monitoring:     RX Monitoring 5/16/2022   Attestation -   Periodic Controlled Substance Monitoring Possible medication side effects, risk of tolerance/dependence & alternative treatments discussed. ;No signs of potential drug abuse or diversion identified. ;Assessed functional status. ;Obtaining appropriate analgesic effect of treatment.        Assessment        REVIEW OF SYSTEMS    SUBJECTIVE: The initial comprehensive symptom assessment is as follows:     Symptom Ratings:     Pain  1       Other Symptoms  0 (none) - 3 (severe)   Nausea no    Anorexia no    Depression no    Anxiety no    Fatigue no    Weakness no    Dyspnea no    Pruritus no    Insomnia no    Sedation no    Constipation no Date of last BM: today   Diarrhea no    Vomiting no    Delirium no      CONSTITUTIONAL:  negative for fevers, chills, sweats, fatigue, weight loss  HEENT:  negative for vision, hearing changes, runny nose, throat pain  RESPIRATORY:  negative for shortness of breath, cough, congestion, wheezing  CARDIOVASCULAR:  negative for chest pain, palpitations  GASTROINTESTINAL:  negative for nausea, vomiting, diarrhea, constipation, change in bowel habits, abdominal pain   GENITOURINARY:  negative for difficulty of urination, burning with urination, frequency   INTEGUMENT:  negative for rash, skin lesions, easy bruising   HEMATOLOGIC/LYMPHATIC:  negative for swelling/edema   ALLERGIC/IMMUNOLOGIC:  negative for urticaria , itching  ENDOCRINE:  negative increase in drinking, increase in urination, hot or cold intolerance  MUSCULOSKELETAL:  negative joint pains, muscle aches, swelling of joints  NEUROLOGICAL:  negative for headaches, dizziness, lightheadedness, numbness, pain, tingling extremities  BEHAVIOR/PSYCH:  negative for depression, anxiety    PHYSICAL ASSESSMENT:  General Appearance:  alert, well appearing, and in no acute distress, somewhat disheveled  Mental status: oriented to person, place, and time  Head:  normocephalic, atraumatic  Eye: no icterus, redness, pupils equal and reactive, extraocular eye movements intact, conjunctiva clear  Ear: normal external ear, no discharge, hearing intact  Nose:  no drainage noted  Mouth: mucous membranes moist  Neck: supple  Lungs: Bilateral equal air entry, clear to auscultation, no wheezing, rales or rhonchi, normal effort  Cardiovascular: normal rate, regular rhythm, no murmur, gallop, rub. Abdomen: Soft, nontender, nondistended, protuberant abdomen, normal bowel sounds  Neurologic: Spontaneous movement in all 4 extremities  Skin: No gross lesions, rashes, bruising or bleeding on exposed skin area  Extremities:  peripheral pulses palpable, no pedal edema or calf pain with palpation  Psych: normal affect     Palliative Performance Scale:  ___100% Full ambulation; normal activity/No disease; full self-care; normal intake; LOC full  ___90% full ambulation; normal activity/some disease; full self-care; normal intake; LOC full  ___80% ambulation full; normal activity with effort/some disease; full self-care; normal/reduced intake; LOC full  _x_70% ambulation reduced; cannot do normal work/some disease; full self-care; normal/reduce intake; LOC full  ___60%  Ambulation reduced; Significant disease; Can't do hobbies/housework; intake normal or reduced; occasional assist; LOC full/confusion  ___50%  Mainly sit/lie;  Extensive disease; Can't do any work; Considerable assist; intake normal or reduced; LOC full/confusion  ___40%  Mainly in bed; Extensive disease; Mainly assist; intake normal or reduced; LOC full/confusion   ___30%  Bed Bound; Extensive disease; Total care; intake reduced; LOC full/confusion  ___20%  Bed Bound; Extensive disease; Total care; intake minimal; Drowsy/coma  ___10%  Bed Bound; Extensive disease; Total care; Mouth care only; Drowsy/coma  ___0       Death      Plan        Education/support to family  Education/support to patient  Providing support for coping/adaptation/distress of family  Providing support for coping/adaptation/distress of patient  Code status clarified: Full Code    Principle Problem/Diagnosis:   Diagnosis Orders   1. Alcoholic cirrhosis of liver with ascites (HCC)  External Referral To Social Work      2. Alcohol abuse             Additional Assessments:   Patient Active Problem List   Diagnosis    Back pain, chronic    Hearing difficulty    GERD (gastroesophageal reflux disease)    Cervical radicular pain    Hypomagnesemia    Chronic viral hepatitis B without delta agent and without coma (HCC)    Calculus of gallbladder without cholecystitis    Hep C w/o coma, chronic (HCC)    Fatty liver    Psychophysiologic insomnia    Cirrhosis (HCC)    Decompensation of cirrhosis of liver (HCC)    Vertebrogenic low back pain    DDD (degenerative disc disease), lumbar    Depression    Tubular adenoma of colon    History of colon polyps    Gynecomastia, male    Lumbar radiculitis    Lumbar disc herniation    Tinnitus    Eustachian tube dysfunction    Ganglion cyst    Carpal tunnel syndrome of right wrist    History of hepatitis C    Vitamin D deficiency    Pure hypercholesterolemia    Acute hypokalemia    Essential hypertension    Recurrent major depressive disorder in partial remission (HCC)    S/P epidural steroid injection    Elevated LFTs    Seasonal allergies    Lumbar facet arthropathy    Cervical facet syndrome    Thrombocytopenia (HCC)    Hepatitis C virus infection resolved after antiviral drug therapy    Alcohol abuse    Altered mental status    Hypocalcemia    Hypophosphatemia    Malignant  neoplasm of lower third of esophagus (HCC)    COVID-19    Anxiety    Current smoker    Severe comorbid illness    Gait instability    Abnormal findings on diagnostic imaging of spine    Cervical spinal stenosis    Spinal stenosis of lumbar region with neurogenic claudication    Low hemoglobin    Symptomatic anemia, microcytic, acute    Hypotension    Former smoker, 50+ pack years, quit 2016    HLD (hyperlipidemia)    Esophageal adenocarcinoma ([facility]    Anemia    Acute kidney injury ([facility]    Ascites due to alcoholic cirrhosis (HCC)    Shortness of breath    Drop in hemoglobin    Esophageal polyp    Acute kidney failure, unspecified (HCC)    Muscle weakness (generalized)    Other abnormalities of gait and mobility    GI bleed    Goals of care, counseling/discussion    ACP (advance care planning)    Palliative care encounter    S/P TIPS (transjugular intrahepatic portosystemic shunt)    Lezama's esophagus with dysplasia    Mastoid disorder, bilateral    Acute deep vein thrombosis (DVT) of brachial vein of right upper extremity (HCC)    Chronic hepatitis C without hepatic coma (HCC)    Swelling of joint of upper arm, right    Anasarca        1- Symptom management/ pain control     Pain Assessment:  The patient is not having any pain.                Anxiety:  none                          Dyspnea:  none                          Fatigue:  none    Other:    2- Goals of care evaluation   The patient goals of care are live longer, improve or maintain function/quality of life, preserve independence/autonomy/control, and support for family/caregiver   Goals of care discussed with:    [] Patient independently    [x] Patient and Family    [] Family or Healthcare DPOA independently    [] Unable to discuss with patient, family/DPOA not present    3- Code Status - FULL CODE    4- Other recommendations   - We will continue to provide comfort and support to the patient and the family    We had a long conversation with the patient and his HCPOA regarding the reason for his visit. He has liver cirrhosis due to alcohol abuse and continues to abuse alcohol (as evidenced by his recent admission with ethanol levels over 250). We explained palliative care and our role in his care over the next several weeks to months to years. We explained that if he continues to drink alcohol and misuse his medications, that he will likely succumb to his disease sooner than later. We also explained that with alcohol cessation and frequent paracentesis and proper use of his medications, he may see some recovery of his liver function and improvement of his symptoms. He does not have any major symptoms today, and he continues to have weekly paracentesis. He does have his power of  paperwork filled out. He also has a living will. We did discuss his CODE STATUS at length, and he would like to remain a full code at this time. We will continue to follow him with a return in 3 months to evaluate how frequently he has been hospitalized. He has had 2 hospitalizations in the last month regarding his liver cirrhosis. We have placed an external referral to social work to assist with costs of medications for the patient.     Electronically signed by      Tiffanie Carnes DO  Hospice/Palliative Care Fellow  [facility], [place]  2/1/2023 2:23 PM

## 2023-02-01 NOTE — ED NOTES
Pt purposefully taking off EKG legs, BP cuff and falling out of bed to get nurse's attention / additional pillows and blankets instead of using call light. Pt A+ox 4. Pt found on floor. Pt needing assistance to get into bed. Pt asked who helps him get up when he falls at home. Pt states he doesn't fall at home. Writer asked pt if he fell on purpose.  Pt did not answer and then got into bed independently     Gerard Gurrola RN  02/01/23 [id number]

## 2023-02-01 NOTE — PROGRESS NOTES
GI OFFICE FOLLOW UP    INTERVAL HISTORY:   No referring provider defined for this encounter. Chief Complaint   Patient presents with    Cirrhosis     Pt here for f/u from [place] [address]. Pt had egd done while in the hosp. 1. Portal hypertension ([address]    2. Decompensated liver disease ([address]    3. History of melena    4. History of malignant neoplasm of esophagus              HISTORY OF PRESENT ILLNESS:   Patient seen along with his wife. Last week patient was at [facility] with anemia. EGD revealed diffuse oozing of blood from lower esophagus and hiatal hernia felt to be secondary to angioectasia. Patient had radiation therapy of lower esophagus and lateral hernia area that may be the etiology for this angioectasia. Patient had APC cauterization of the angioectasia. Patient did well and was discharged. Following discharge he is doing reasonably well. Has light brown color stools. No nausea vomiting. He is known to have alcoholic liver disease, decompensated. 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 At present no signs of visible tumor seen in the esophagus. Patient is having significant ascites needing paracentesis on a weekly basis. He also had mild renal insufficiency and cannot diurese him aggressively. At present he is on spironolactone 25 mg a day and furosemide 40 mg a day. Past Medical,Family, and Social History reviewed and does contribute to the patient presenting condition. Patient's PMH/PSH,SH,PSYCH Hx, MEDs, ALLERGIES, and ROS were all reviewed and updated in the appropriate sections.  Yes      PAST MEDICAL HISTORY:  Past Medical History:   Diagnosis Date    Adenocarcinoma in a polyp ([facility]     Alcoholic cirrhosis of liver with ascites ([facility]     Anemia 04/13/2022    Anxiety     Arthritis Back pain, chronic     dr. Sam Carpenter, orthopedic, every 3-4 months, gets steroid injection    Lezama esophagus     Bleeding gastric varices 12/29/2022    BPH (benign prostatic hypertrophy)     Cholelithiasis     Cirrhosis ([address]     COVID-19 12/2020    pt reports he had a positive test while at [facility] in 2020, was asymptomatic    COVID-19 vaccine series completed 5/20/2021, 6/22/2021    Moderna 5/20/2021, 6/22/2021    DDD (degenerative disc disease), lumbar     Depression     Esophageal cancer ([address]     INVASIVE ADENOCARCINOMA ARISING IN TUBULAR ADENOMA WITH HIGH GRADE DYSPLASIA, ASSOCIATED WITH FOCAL INTESTINAL METAPLASIA     Esophageal varices ([address]     Fatty liver     GERD (gastroesophageal reflux disease)     GI bleed     Hep C w/o coma, chronic ([address]     History of alcohol abuse     6-12 beers a day; quit drinking 2019    History of blood transfusion     History of colon polyps 2016    History of tobacco abuse     [place] (hard of hearing)     Hyperlipidemia     Hypertension     Hyponatremia 07/20/2016    Hypotension 12/20/2021    Mastoid disorder, bilateral 12/31/2022    biLateral mastoid effusion    PONV (postoperative nausea and vomiting)     Port-A-Cath in place     right upper chest    Portal hypertension (HCC)     Sciatica     Secondary esophageal varices ([address] 06/07/2022    Shortness of breath     Spinal stenosis     Stomach ulcer     hx of    Thrombocytopenia ([address] 12/23/2020    Tubular adenoma of colon 2016, 2018    Vitamin D deficiency     Wears glasses        Past Surgical History:   Procedure Laterality Date    BUNIONECTOMY      twice on right side    BUNIONECTOMY Left     CARPAL TUNNEL RELEASE Right     COLONOSCOPY      at age 35    COLONOSCOPY  10/05/2016    polyps-pathology tubular adenoma, and abnormal looking mucosa right colon-pathology-tubular adenoma    COLONOSCOPY N/A 03/30/2018    COLONOSCOPY POLYPECTOMY COLD BIOPSY performed by Casey Adkins MD at [address],[facility]  03/30/2018 Small polyp in the sigmoid colon and excised with biopsy forceps--tubular adenoma    COLONOSCOPY N/A 04/16/2022    COLONOSCOPY POLYPECTOMY performed by Johann Sanchez MD at [place] [address], COLON, DIAGNOSTIC      EGD    ESOPHAGOGASTRODUODENOSCOPY  12/29/2022    ESOPHAGOGASTRODUODENOSCOPY N/A 01/03/2023    IR PORT PLACEMENT EQUAL OR GREATER THAN 5 YEARS  04/19/2021    IR PORT PLACEMENT EQUAL OR GREATER THAN 5 YEARS 4/19/2021 [facility]    IR TIPS INSERTION  12/01/2022    IR TIPS INSERTION 12/1/2022 Chiqui Stockton MD ST [facility] PROC[facility]    KNEE SURGERY Left     cyst removed    NASAL SEPTUM SURGERY      NERVE BLOCK Right 11/23/2020    NERVE BLOCK RIGHT CERVICAL STEROID INJECTION  C3-C6 performed by Osbaldo Ambrocio MD at [address] [facility]  01/04/2016    steroid injection C7 T1    OTHER SURGICAL HISTORY  11/21/2016    Bilateral Lumbar CACHORRO L4-L5 injections    OTHER SURGICAL HISTORY  12/19/2016    lumbar steroid injection    OTHER SURGICAL HISTORY  09/28/2018    BILATERAL L5 CACHORRO (N/A Back)    OTHER SURGICAL HISTORY Right 11/23/2020    cervical injection    PAIN MANAGEMENT PROCEDURE Left 07/09/2020    EPIDURAL STEROID INJECTION LEFT L4 L5 performed by Osbaldo Ambrocio MD at [facility] Left 07/20/2020    LEFT L4 L5 EPIDURAL STEROID INJECTION performed by Osbaldo Ambrocio MD at [facility] Bilateral 08/17/2020    LUMBAR FACET BILATERAL L2-L5 performed by Osbaldo Ambrocio MD at [facility] Bilateral 12/07/2020    NERVE BLOCK BILATERAL LUMBAR MEDIAL BRANCH L2-L5 performed by Osbaldo Ambrocio MD at [address] [employer] NEUROPLAST[facility] &/TRANSPOS MEDIAN NRV CARPAL Niko Casie Right 08/29/2017    CARPAL TUNNEL RELEASE RIGHT performed by Lauren Horta MD at [address] &/TRANSPOS MEDIAN NRV CARPAL TUNNE Left 10/31/2017    CARPAL TUNNEL RELEASE performed by Lauren Horta MD at [facility] OR    IA NJX AA&/STRD TFRML EPI LUMBAR/SACRAL 1 LEVEL Bilateral 09/06/2018    BILATERAL L5 CACHORRO performed by Danica Fung MD at [facility] AA&/STRD TFRML EPI LUMBAR/SACRAL 1 LEVEL N/A 09/28/2018    BILATERAL L5 CACHORRO performed by Danica Fung MD at [place] [address] 12/29/2020    EGD BIOPSY performed by Kira Ambriz MD at [address] [place] [address] 02/02/2021    EGD BIOPSY and spot marking performed by Luis Cisneros MD at [address] [place] [address] 02/12/2021    ENDOSCOPIC ULTRASOUND, EGD performed by Colletta Couch, MD at [place] [address]  02/12/2021    EGD DIAGNOSTIC ONLY performed by Colletta Couch, MD at [place] [address] N/A 08/31/2021    EGD BIOPSY performed by Luis Cisneros MD at [address] [place] [address] 01/21/2022    EGD BIOPSY performed by Luis Cisneros MD at [place] [address] N/A 04/15/2022    EGD ESOPHAGOGASTRODUODENOSCOPY performed by Kira Ambriz MD at [place] [address] 06/06/2022    EGD BAND LIGATION performed by Victoriano Odom MD at [place] [address] N/A 06/09/2022    EGD ESOPHAGOGASTRODUODENOSCOPY performed by Victoriano Odom MD at [place] [address] N/A 09/14/2022    EGD ESOPHAGOGASTRODUODENOSCOPY ENDOSCOPIC APC AT GE JUNCTION performed by Brandi Cuellar MD at [address] [place] [address] 10/19/2022    EGD BIOPSY performed by Luis Cisneros MD at [address] [place] [address] 10/31/2022    EGD with APC performed by Luis Cisneros MD at [place] [address]  12/29/2022    EGD ESOPHAGOGASTRODUODENOSCOPY performed by Colletta Couch, MD at [place] [address] 1/3/2023    EGD ESOPHAGOGASTRODUODENOSCOPY performed by Aubrie Lan MD at [address]    EGD CONTROL HEMORRHAGE performed by Sariah French MD at  [facility] [address]:    Current Outpatient Medications:     sucralfate (CARAFATE) 1 GM tablet, Take 1 tablet by mouth 4 times daily, Disp: 120 tablet, Rfl: 3    lactulose (CHRONULAC) 10 GM/15ML solution, take 30 milliliters by mouth three times a day, Disp: 473 mL, Rfl: 1    furosemide (LASIX) 40 MG tablet, Take 1 tablet by mouth in the morning and 1 tablet in the evening., Disp: 60 tablet, Rfl: 3    spironolactone (ALDACTONE) 25 MG tablet, Take 1 tablet by mouth daily, Disp: 30 tablet, Rfl: 3    pantoprazole (PROTONIX) 40 MG tablet, Take 40 mg by mouth daily, Disp: , Rfl:     FEROSUL 325 (65 Fe) MG tablet, take 1 tablet by mouth twice a day, Disp: 60 tablet, Rfl: 5    atorvastatin (LIPITOR) 20 MG tablet, Take 1 tablet by mouth nightly, Disp: 30 tablet, Rfl: 3    ALLERGIES:   No Known Allergies    FAMILY HISTORY:       Problem Relation Age of Onset    Cancer Mother         pancreatic    Cancer Father         bone    Diabetes Sister     Asthma Brother     Allergies Brother         MULTIPLE         SOCIAL HISTORY:   Social History     Socioeconomic History    Marital status: Single     Spouse name: Not on file    Number of children: Not on file    Years of education: Not on file    Highest education level: Not on file   Occupational History    Not on file   Tobacco Use    Smoking status: Former     Packs/day: 1.00     Years: 45.00     Pack years: 45.00     Types: Cigarettes     Quit date: 2017     Years since quittin.0    Smokeless tobacco: Never   Vaping Use    Vaping Use: Never used   Substance and Sexual Activity    Alcohol use: Not Currently     Comment: Quit alcohol in 2019- heavier drinking prior to quitting    Drug use: Not Currently     Frequency: 1.0 times per week Types: Cocaine     Comment: Cocaine- stopped spring 2016    Sexual activity: Yes     Partners: Female   Other Topics Concern    Not on file   Social History Narrative     in the past, retired     Social Determinants of Health     Financial Resource Strain: Low Risk     Difficulty of Paying Living Expenses: Not hard at all   Food Insecurity: No Food Insecurity    Worried About [employer] [employer] in the Last Year: Never true    [address] [employer] [place] in the Last Year: Never true   Transportation Needs: Not on file   Physical Activity: Inactive    Days of Exercise per Week: 0 days    Minutes of Exercise per Session: 0 min   Stress: Not on file   Social Connections: Not on file   Intimate Partner Violence: Not on file   Housing Stability: Not on file         REVIEW OF SYSTEMS:         Review of Systems   Constitutional: Negative. HENT: Negative. Eyes:  Positive for visual disturbance (glasses). Respiratory:  Positive for shortness of breath. Cardiovascular: Negative. Gastrointestinal:  Positive for abdominal distention. Endocrine: Negative. Genitourinary: Negative. Musculoskeletal:  Positive for back pain. Skin: Negative. Allergic/Immunologic: Negative. Neurological: Negative. Hematological:  Bruises/bleeds easily. Psychiatric/Behavioral: Negative. PHYSICAL EXAMINATION:     Vital signs reviewed per the nursing documentation. /62   Ht 5' 11\" (1.803 m)   Wt 208 lb (94.3 kg)   BMI 29.01 kg/m²   Body mass index is 29.01 kg/m². Physical Exam  Vitals reviewed. Constitutional:       Appearance: Normal appearance. HENT:      Head: Normocephalic and atraumatic. Eyes:      Extraocular Movements: Extraocular movements intact. Conjunctiva/sclera: Conjunctivae normal.   Cardiovascular:      Rate and Rhythm: Normal rate and regular rhythm. Heart sounds: Normal heart sounds.    Pulmonary:      Effort: Pulmonary effort is normal.      Breath sounds: Normal breath sounds. Abdominal:      General: Bowel sounds are normal. There is no distension. Palpations: Abdomen is soft. There is no mass. Tenderness: There is no abdominal tenderness. There is no guarding. Hernia: No hernia is present. Comments: Has moderate ascites. No tenderness. Bowel sounds active. Has painful signs of chronic liver disease. Skin:     General: Skin is warm and dry. Neurological:      General: No focal deficit present. Mental Status: He is alert and oriented to person, place, and time. Psychiatric:         Mood and Affect: Mood normal.         Behavior: Behavior normal.         LABORATORY DATA: Reviewed  Lab Results   Component Value Date    WBC 5.9 01/27/2023    HGB 8.3 (L) 01/30/2023    HCT 27.3 (L) 01/30/2023    MCV 84.3 01/27/2023    PLT 99 (L) 01/27/2023     (L) 01/27/2023    K 3.9 01/27/2023    CL 99 01/27/2023    CO2 26 01/27/2023    BUN 10 01/27/2023    CREATININE 0.67 (L) 01/27/2023    LABPROT 7.7 04/19/2012    LABALBU 2.4 (L) 01/24/2023    BILITOT 1.5 (H) 01/24/2023    ALKPHOS 197 (H) 01/24/2023    AST 69 (H) 01/24/2023    ALT 25 01/24/2023    INR 1.4 01/24/2023         Lab Results   Component Value Date    RBC 2.99 (L) 01/27/2023    HGB 8.3 (L) 01/30/2023    MCV 84.3 01/27/2023    MCH 27.1 01/27/2023    MCHC 32.1 01/27/2023    RDW 19.4 (H) 01/27/2023    MPV 7.0 01/27/2023    BASOPCT 0 01/24/2023    LYMPHSABS 0.86 (L) 01/24/2023    MONOSABS 0.14 01/24/2023    NEUTROABS 3.75 01/24/2023    EOSABS 0.05 01/24/2023    BASOSABS 0.00 01/24/2023         DIAGNOSTIC TESTING:     CT ABDOMEN PELVIS W IV CONTRAST Additional Contrast? Radiologist Recommendation    Result Date: 1/3/2023  EXAMINATION: CT OF THE ABDOMEN AND PELVIS WITH CONTRAST 1/3/2023 1:58 am TECHNIQUE: CT of the abdomen and pelvis was performed with the administration of intravenous contrast. Multiplanar reformatted images are provided for review.  Automated exposure control, iterative reconstruction, and/or weight based adjustment of the mA/kV was utilized to reduce the radiation dose to as low as reasonably achievable. COMPARISON: 12/27/2022 HISTORY: ORDERING SYSTEM PROVIDED HISTORY: rectal bleeding TECHNOLOGIST PROVIDED HISTORY: rectal bleeding Reason for Exam: rectal bleeding FINDINGS: Lower Chest: Small to moderate bilateral pleural effusions. Opacification along the posterior margin of the lower lobes suggesting compressive atelectasis. There is a small to moderate the effusions were present on the prior study as well. Mild thickening of the distal esophagus. Pericardial effusion as well. Organs: Cirrhotic liver with nodular margin. A tips shunt is present. The contrast timing is not dedicated for portal venous phase but the shunt appears to be patent. Gallbladder does contain multiple calcified gallstones and is mildly distended. There is ascites around the margin of the liver and in the upper abdomen degrading evaluation for the local inflammation. Spleen is stable. No pancreatic ductal dilatation and the pancreatic enhancement is normal.  Adrenal glands are not enlarged. Kidneys are enhancing equally without hydronephrosis or hydroureter. GI/Bowel: Mild thickening the gastric mucosal folds and into the distal esophagus. There are fluid distended loops of small bowel in the mid and lower abdomen. Edema of the wall of the small bowel loops in the right lower quadrant with the surrounding ascites. Fluid is also present in the colon and rectum. There is no significant solid fecal load. There is a short segment of very collapsed rectum near the rectosigmoid junction which is worse than the recent exam and may represent spasm rather than underlying mass. Scattered ascites in the abdomen but no pneumoperitoneum. Pelvis: Urinary bladder is collapsed around a Neal catheter and the wall is not evaluated. The prostate is not enlarged.  Peritoneum/Retroperitoneum: Atherosclerosis of the aorta and branches but no abdominal aortic aneurysm. Peripherally calcified structure in the left upper quadrant could be a thrombosed splenic artery aneurysm measuring up to 3.1 cm but unchanged from prior exams. . Bones/Soft Tissues: Diffuse degenerative changes in the spine. Stable appearance of T12. body wall edema. 1.  There is a short segment of narrowing of the rectum near the rectosigmoid junction compared to the recent exam and may represent spasm rather than underlying mass. Fluid-filled loops of small bowel and colon suggesting diarrhea with enteritis or colitis. Evaluation of true wall thickening is limited by the diffuse edematous state. 2.  Cirrhotic liver with tips, ascites, and sequela of portal hypertension. 3.  Thickening of the gastric mucosal folds and into the distal esophagus. Correlate with prior history of malignancy. 4.  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 5.  Pleural effusions and pericardial effusion are redemonstrated. The opacified portions of the lower lungs appears to be from compressive atelectasis. US LIVER    Result Date: 1/26/2023  EXAMINATION: RIGHT UPPER QUADRANT ULTRASOUND 1/25/2023 1:26 pm COMPARISON: 12/28/2022, 12/30/2022 HISTORY: ORDERING SYSTEM PROVIDED HISTORY: Evaluate TIPS TECHNOLOGIST PROVIDED HISTORY: Evaluate TIPS FINDINGS: LIVER:  Liver has a somewhat coarse heterogeneous echotexture with surface nodularity compatible with history of cirrhosis. Liver length is 15 cm. By ultrasound no focal liver masses are seen. There is no intrahepatic biliary dilatation. TIPS stent is visualized and demonstrates flow with color Doppler evaluation. Velocities are somewhat low but this may be on a technical basis. BILIARY SYSTEM:  There are stones in the gallbladder without convincing sonographic findings of cholecystitis. Common bile duct could not be visualized.  RIGHT KIDNEY: The right kidney is grossly unremarkable without evidence of hydronephrosis. PANCREAS:  Pancreas is not seen. OTHER: Moderate perihepatic ascites. Small right pleural effusion. Cirrhotic appearance of liver with a TIPS stent which appears grossly patent. Ascites. Small right pleural effusion. Cholelithiasis. XR CHEST PORTABLE    Result Date: 1/24/2023  EXAMINATION: ONE XRAY VIEW OF THE CHEST 1/24/2023 8:24 pm COMPARISON: 01/07/2023 CT chest and 01/03/2023 chest radiograph HISTORY: ORDERING SYSTEM PROVIDED HISTORY: cough TECHNOLOGIST PROVIDED HISTORY: cough Reason for Exam: cough FINDINGS: Right chest port catheter tip overlies the right atrium. The cardiomediastinal silhouette is enlarged, unchanged. Small left and trace right pleural effusions. Bibasilar and left midlung opacities are favored to represent edema and/or atelectasis. No pneumothorax. Osseous structures are unchanged. 1.  Small left and trace right pleural effusions. 2.  Opacities in the left perihilar region and bilateral lung bases may represent a combination of edema and atelectasis. Superimposed infectious process is not excluded. XR CHEST PORTABLE    Result Date: 1/3/2023  EXAMINATION: ONE XRAY VIEW OF THE CHEST 1/3/2023 7:22 am COMPARISON: 01/02/2023, 12/31/2022 HISTORY: ORDERING SYSTEM PROVIDED HISTORY: sepsis TECHNOLOGIST PROVIDED HISTORY: sepsis FINDINGS: Cardiac silhouette enlargement again noted. Right internal jugular port in place. Vascular congestion, basilar opacities and small pleural effusions appear increasing. No pneumothorax identified. Increasing vascular congestion with small pleural effusions, suggestive of developing edema. CT CHEST PULMONARY EMBOLISM W CONTRAST    Result Date: 1/7/2023  EXAMINATION: CTA OF THE CHEST 1/7/2023 10:07 pm TECHNIQUE: CTA of the chest was performed after the administration of intravenous contrast.  Multiplanar reformatted images are provided for review.   MIP images are provided for review. Automated exposure control, iterative reconstruction, and/or weight based adjustment of the mA/kV was utilized to reduce the radiation dose to as low as reasonably achievable. COMPARISON: None. HISTORY: ORDERING SYSTEM PROVIDED HISTORY: SOB DVT TECHNOLOGIST PROVIDED HISTORY: SOB DVT Reason for Exam: SOB DVT FINDINGS: Pulmonary Arteries: Pulmonary arteries are adequately opacified for evaluation. No evidence of intraluminal filling defect to suggest pulmonary embolism. Main pulmonary artery is normal in caliber. Mediastinum: No evidence of mediastinal lymphadenopathy. The heart is normal size. Moderate pericardial effusion. .  There is no acute abnormality of the thoracic aorta. Lungs/pleura: Moderate bilateral pleural effusion is associated with atelectatic changes of right lower lobe and lingula and left lower lobe. . Focal consolidative change within the anterior aspect of right upper lobe peripheral aspect of left upper lobe and medial aspect of right middle lobe are likely suggestive of multifocal pneumonia. The lungs are otherwise without acute process. No pulmonary edema. No pneumothorax. Calcified granuloma within the lingula. Upper Abdomen: Cirrhotic liver with TIPS, ascites and portal hypertension . Calcified thrombosed splenic artery measuring 3.1 cm. .  Cholelithiasis with no signs of cholecystitis Soft Tissues/Bones: No acute bone or soft tissue abnormality. No evidence of pulmonary embolism. Moderate bilateral pleural effusion is associated with atelectatic changes of right lower lobe and lingula and left lower lobe. . Focal consolidative change within the anterior aspect of right upper lobe peripheral aspect of left upper lobe and medial aspect of right middle lobe are likely suggestive of multifocal pneumonia. Moderate pericardial effusion. Cirrhotic liver with TIPS, ascites and portal hypertension . Calcified thrombosed splenic artery measuring 3.1 cm. Mp [place] RECOMMENDATIONS: Unavailable     VL DUP UPPER EXTREMITY VENOUS RIGHT    Result Date: 1/7/2023    [facility]  Vascular Upper Extremities Veins Procedure   Patient Name   Melinda Sidhu    Date of Study           01/07/2023                 ALHAJI HSU   Date of Birth  1960  Gender                  Male   Age            60 year(s)  Race                       Room Number    6748        Height:                 70 inch, 177.8 cm   Corporate ID # [id number]    Weight:                 192 pounds, 87.1 kg   Patient Acct # [de-identified]   BSA:        2.05 m^2    BMI:      27.55 kg/m^2   MR #           [id number]     Sonographer             Jenna Plant   Accession #    [id number]  Interpreting Physician  Delos Cooks   Referring                  Referring Physician     Hubert Magallon  Nurse  Practitioner  Procedure Type of Study:   Veins: Upper Extremities Veins, Venous Scan Upper Right. Indications for Study:R/O DVT and Arm swelling. Patient Status: In Patient. - Critical Result:Madie HSU RN at 5:01 PM.  Conclusions   Summary   Simultaneous real time imaging utilizing B-Mode, color doppler and  spectral waveform analysis was performed on the right upper extremity for  venous examination of the deep and superficial systems. Findings are:   Right:  Acute deep venous thrombosis identified in the brachial vein. Superficial venous thrombosis of the cephalic vein in the forearm. Signature   ----------------------------------------------------------------  Electronically signed by Bhavana Harris(Sonographer) on  01/07/2023 05:01 PM  ----------------------------------------------------------------   ----------------------------------------------------------------  Electronically signed by Jennifer Ayala(Interpreting  physician) on 01/07/2023 07:54 PM  ----------------------------------------------------------------  Findings:   Right Impression:   The brachial vein is partially compressible with hypoechoic echoes and  phasic color Doppler signals. The cephalic vein is non compressible with hypoechoic echoes with absent  color Doppler signals throughout the forearm. Internal jugular, subclavian, axillary, radial, proximal cephalic and  basilic veins are compressible with normal doppler responses. The ulnar vein was not visualized. Risk Factors History +---------+----+-----------------------------------------------------------+ ! Diagnosis! Date! Comments                                                   ! +---------+----+-----------------------------------------------------------+ ! Other    ! !Port-a-cath in place right chest.                          ! +---------+----+-----------------------------------------------------------+   - The patient's risk factor(s) include: dyslipidemia and arterial     hypertension.   - The patient has a former tobacco history. - The patient has esophageal cancer. Velocities are measured in cm/s ; Diameters are measured in cm Right UE Vein Measurements 2D Measurements +------------------------------------+----------+---------------+----------+ ! Location                            ! Visualized! Compressibility! Thrombosis! +------------------------------------+----------+---------------+----------+ ! Prox IJV                            ! Yes       ! Yes            ! None      ! +------------------------------------+----------+---------------+----------+ ! Dist IJV                            ! Yes       ! Yes            ! None      ! +------------------------------------+----------+---------------+----------+ ! Prox SCV                            ! Yes       ! !None      ! +------------------------------------+----------+---------------+----------+ ! Dist SCV                            ! Yes       ! !None      ! +------------------------------------+----------+---------------+----------+ ! Prox Axillary                       ! Yes       ! Yes            ! None      ! +------------------------------------+----------+---------------+----------+ ! Dist Axillary                       ! Yes       ! Yes            ! None      ! +------------------------------------+----------+---------------+----------+ ! Prox Brachial                       !Yes       ! Partial        !AI        ! +------------------------------------+----------+---------------+----------+ ! Dist Brachial                       !Yes       ! Yes            ! None      ! +------------------------------------+----------+---------------+----------+ ! Prox Radial                         !Yes       ! Yes            ! None      ! +------------------------------------+----------+---------------+----------+ ! Dist Radial                         !Yes       ! Yes            ! None      ! +------------------------------------+----------+---------------+----------+ ! Prox Ulnar                          ! No        !               !          ! +------------------------------------+----------+---------------+----------+ ! Dist Ulnar                          ! No        !               !          ! +------------------------------------+----------+---------------+----------+ ! Basilic at UA                       ! Yes       ! Yes            ! None      ! +------------------------------------+----------+---------------+----------+ ! Basilic at AF                       ! Yes       ! Yes            ! None      ! +------------------------------------+----------+---------------+----------+ ! Basilic at [address]                       ! Yes       ! Yes            ! None      ! +------------------------------------+----------+---------------+----------+ ! Cephalic at UA                      ! Yes       ! Yes            ! None      ! +------------------------------------+----------+---------------+----------+ ! Jordin at AF                      ! Yes       ! Yes            ! None      ! +------------------------------------+----------+---------------+----------+ ! Jordin at [address] !Yes       !No             !AI        ! +------------------------------------+----------+---------------+----------+ Doppler Measurements +------------------------+-------------------------+-----------------------+ ! Location                ! Signal                   !Reflux                 ! +------------------------+-------------------------+-----------------------+ ! IJV                     ! Pulsatile                !                       ! +------------------------+-------------------------+-----------------------+ ! SCV                     ! Phasic                   !                       ! +------------------------+-------------------------+-----------------------+ ! Axillary                ! Phasic                   !                       ! +------------------------+-------------------------+-----------------------+ ! Brachial                !Phasic                   !                       ! +------------------------+-------------------------+-----------------------+ Left UE Vein Measurements Doppler Measurements +------------------------+-------------------------+-----------------------+ ! Location                ! Signal                   !Reflux                 ! +------------------------+-------------------------+-----------------------+ ! SCV                     ! Pulsatile                !                       ! +------------------------+-------------------------+-----------------------+    IR US GUIDED PARACENTESIS    Result Date: 1/20/2023  PROCEDURE: PARACENTESIS WITHOUT IMAGE GUIDANCE US ABDOMEN LIMITED 1/20/2023 HISTORY: ORDERING SYSTEM PROVIDED HISTORY: Alcoholic cirrhosis of liver with ascites ([facility] [address] TECHNOLOGIST PROVIDED HISTORY: Weekly due o amount drained every 2 weeks TECHNIQUE: Informed consent was obtained after a detailed explanation of the procedure including risks, benefits, and alternatives. Universal protocol was followed. A limited ultrasound of the abdomen was performed.  The right abdomen was prepped and draped in sterile fashion and local anesthesia was achieved with lidocaine. A 5 Thai needle sheath was advanced into ascites and paracentesis was performed. The patient tolerated the procedure well. Final imaging showed absence of ascitic fluid right side. FINDINGS: Limited ultrasound of the abdomen demonstrates ascites. A total of 3.8 L was removed. Fluid was somewhat chylous in appearance. Successful therapeutic paracentesis. IR US GUIDED PARACENTESIS    Result Date: 1/13/2023  PROCEDURE: PARACENTESIS WITHOUT IMAGE GUIDANCE US ABDOMEN LIMITED 1/13/2023 HISTORY: ORDERING SYSTEM PROVIDED HISTORY: Alcoholic cirrhosis of liver with ascites ([facility] [address] TECHNOLOGIST PROVIDED HISTORY: Weekly due o amount drained every 2 weeks TECHNIQUE: Informed consent was obtained after a explanation of the procedure including risks. Universal protocol was followed. A limited ultrasound of the abdomen was performed. The right abdomen was prepped and draped in sterile fashion, and local anesthesia was achieved with 1% lidocaine. A 5 Thai needle sheath was advanced into the ascites under direct ultrasound guidance (a sterile probe cover and gel were used), and paracentesis was performed. A total of 2.2 L of clear yellow fluid was aspirated. The patient tolerated the procedure well without immediately apparent complication. FINDINGS: Initial limited abdominal ultrasound demonstrated a moderate amount of ascites. Successful paracentesis     IR US GUIDED PARACENTESIS    Result Date: 1/3/2023  PROCEDURE: Ultrasound-guided paracentesis 1/3/2023 HISTORY: ORDERING SYSTEM PROVIDED HISTORY: ascites TECHNOLOGIST PROVIDED HISTORY: ascites TECHNIQUE: This procedure was performed by Dilshad Luke PA-C under indirect supervision of . Informed consent was obtained after a detailed explanation of the procedure including the risks, benefits, and alternatives. Universal protocol was followed.  All elements of maximal sterile barrier technique, including cap, mask, sterile gown, sterile gloves, sterile sheet, hand hygiene and 2% chlorhexidine for cutaneous antisepsis were followed. The area was prepped and draped in sterile fashion using maximum barrier technique and local anesthesia was achieved with lidocaine. Pre-procedure ultrasound shows a dominant fluid pocket in the right lowerquadrant. Using ultrasound guidance (image attached to the medical record), the fluid pocket was accessed with a 5 [place] Lor[place] Yueh needle with aspiration of clear yellow fluid. [employer] US vacuum machine was connected and paracentesis was performed and approximately 2200 mL were removed. Post procedural ultrasound demonstrated no residual fluid. A sample was collected and sent for laboratory analysis. Estimated blood loss was minimal. The patient tolerated the procedure well and left the department in good condition. FINDINGS: Limited ultrasound of the abdomen demonstrates ascites. A total of 2200 mL of clear yellow fluid was removed. Successful ultrasound-guided therapeutic paracentesis. US GUIDED PARACENTESIS    Result Date: 1/6/2023  PROCEDURE: Ultrasound-guided paracentesis 1/6/2023 HISTORY: ORDERING SYSTEM PROVIDED HISTORY: ascities TECHNOLOGIST PROVIDED HISTORY: ascities TECHNIQUE: This procedure was performed by Azael Lucero PA-C under indirect supervision of . Informed consent was obtained after a detailed explanation of the procedure including the risks, benefits, and alternatives. Universal protocol was followed. All elements of maximal sterile barrier technique, including cap, mask, sterile gown, sterile gloves, sterile sheet, hand hygiene and 2% chlorhexidine for cutaneous antisepsis were followed. The area was prepped and draped in sterile fashion using maximum barrier technique and local anesthesia was achieved with lidocaine.  Pre-procedure ultrasound shows a dominant fluid pocket in the right lowerquadrant. Using ultrasound guidance (image attached to the medical record), the fluid pocket was accessed with a 5 [place] Lorrie Yueh needle with aspiration of clear yellow fluid. [employer] vacuum machine was connected and paracentesis was performed and approximately 1200 mL were removed. Post procedural ultrasound demonstrated no residual fluid. A sample was not sent for laboratory analysis. Estimated blood loss was minimal. The patient tolerated the procedure well and left the department in good condition. FINDINGS: Limited ultrasound of the abdomen demonstrates ascites. A total of 1200 mL of clear yellow fluid was removed. Successful ultrasound-guided paracentesis. Assessment  1. Portal hypertension ([address]    2. Decompensated liver disease ([address]    3. History of melena    4. History of malignant neoplasm of esophagus        Plan     discussed with the patient to follow low-sodium diet. tocontinue present medications. He may need EGD to evaluate angioectasia and may need further APC cauterization. May need transfusions if hemoglobin drops below 7. Monitor electrolytes. Thank you for allowing me to participate in the care of Mr. Conor Montero. For any further questions please do not hesitate to contact me. I have reviewed and agree with the ROS entered by the MA/LPN. Note is dictated utilizing voice recognition software. Unfortunately this leads to occasional typographical errors.  Please contact our office if you have any questions        Mayra Chapa MD,FACP, [facility]  Board Certified in Gastroenterology and [address] [place] [address] Gastroenterology  Office #: [phone]

## 2023-02-02 ENCOUNTER — HOSPITAL ENCOUNTER (OUTPATIENT)
Dept: PREADMISSION TESTING | Age: 63
Discharge: HOME | End: 2023-02-06

## 2023-02-02 DIAGNOSIS — K74.69 DECOMPENSATED LIVER DISEASE (HCC): Primary | ICD-10-CM

## 2023-02-02 RX ORDER — SPIRONOLACTONE 50 MG/1
50 TABLET, FILM COATED ORAL EVERY EVENING
Qty: 30 TABLET | Refills: 3 | OUTPATIENT
Start: 2023-02-02

## 2023-02-03 ENCOUNTER — HOSPITAL ENCOUNTER (OUTPATIENT)
Dept: INTERVENTIONAL RADIOLOGY/VASCULAR | Age: 63
End: 2023-02-03
Payer: MEDICARE

## 2023-02-03 VITALS
BODY MASS INDEX: 30.06 KG/M2 | WEIGHT: 210 LBS | OXYGEN SATURATION: 100 % | SYSTOLIC BLOOD PRESSURE: 115 MMHG | HEART RATE: 85 BPM | TEMPERATURE: 97.5 F | HEIGHT: 70 IN | RESPIRATION RATE: 18 BRPM | DIASTOLIC BLOOD PRESSURE: 65 MMHG

## 2023-02-03 VITALS — BODY MASS INDEX: 30.06 KG/M2 | HEIGHT: 70 IN | WEIGHT: 210 LBS

## 2023-02-03 DIAGNOSIS — K70.31 ALCOHOLIC CIRRHOSIS OF LIVER WITH ASCITES (HCC): ICD-10-CM

## 2023-02-03 PROCEDURE — 7100000010 HC PHASE II RECOVERY - FIRST 15 MIN

## 2023-02-03 PROCEDURE — 2709999900 IR US GUIDED PARACENTESIS

## 2023-02-03 PROCEDURE — 49083 ABD PARACENTESIS W/IMAGING: CPT

## 2023-02-03 RX ORDER — SPIRONOLACTONE 25 MG/1
25 TABLET ORAL DAILY
Qty: 30 TABLET | Refills: 3 | Status: SHIPPED | OUTPATIENT
Start: 2023-02-03

## 2023-02-03 RX ORDER — SODIUM CHLORIDE 0.9 % (FLUSH) 0.9 %
5-40 SYRINGE (ML) INJECTION EVERY 12 HOURS SCHEDULED
Status: DISCONTINUED | OUTPATIENT
Start: 2023-02-03 | End: 2023-02-06 | Stop reason: HOSPADM

## 2023-02-03 RX ORDER — SODIUM CHLORIDE 9 MG/ML
INJECTION, SOLUTION INTRAVENOUS PRN
Status: DISCONTINUED | OUTPATIENT
Start: 2023-02-03 | End: 2023-02-06 | Stop reason: HOSPADM

## 2023-02-03 RX ORDER — SODIUM CHLORIDE 0.9 % (FLUSH) 0.9 %
5-40 SYRINGE (ML) INJECTION PRN
Status: DISCONTINUED | OUTPATIENT
Start: 2023-02-03 | End: 2023-02-06 | Stop reason: HOSPADM

## 2023-02-03 ASSESSMENT — ENCOUNTER SYMPTOMS
WHEEZING: 0
SORE THROAT: 0
NAUSEA: 0
RHINORRHEA: 0
VOMITING: 0
TROUBLE SWALLOWING: 0
ANAL BLEEDING: 0
CONSTIPATION: 0
SHORTNESS OF BREATH: 0
ABDOMINAL PAIN: 0
COUGH: 0
BLOOD IN STOOL: 0
DIARRHEA: 0

## 2023-02-03 ASSESSMENT — PAIN - FUNCTIONAL ASSESSMENT: PAIN_FUNCTIONAL_ASSESSMENT: NONE - DENIES PAIN

## 2023-02-03 NOTE — PROGRESS NOTES
Patient tolerated procedure well without distress.  2.4 L of cloudy, yellow fluid removed. Area cleansed & dry dressing applied. Transport notified to take patient back to [facility].  JERI Green updated.

## 2023-02-03 NOTE — H&P (VIEW-ONLY)
HISTORY and Treinta ALAYNA Faith [address]       NAME:  Sabina Ch  MRN: [id number]   YOB: 1960   Date: 2/3/2023   Age: 60 y.o. Gender: male     COMPLAINT AND PRESENT HISTORY:   Sabina Ch is 60 y.o.,  male, undergoing IR [address] for Alcoholic cirrhosis of liver with ascites per Dr. Navdeep Odom. HPI:  SEE PORTION OF NOTE BELOW PER DR. GALAN, 2-1-23 (REVIEWED):       [id number] Complaint   Patient presents with    Cirrhosis       Pt here for f/u from [place] [address]. Pt had egd done while in the hosp. 1. Portal hypertension ([address]    2. Decompensated liver disease ([address]    3. History of melena    4. History of malignant neoplasm of esophagus       HISTORY OF PRESENT ILLNESS:   Patient seen along with his wife. Last week patient was at [facility] with anemia. EGD revealed diffuse oozing of blood from lower esophagus and hiatal hernia felt to be secondary to angioectasia. Patient had radiation therapy of lower esophagus and lateral hernia area that may be the etiology for this angioectasia. Patient had APC cauterization of the angioectasia. Patient did well and was discharged. Following discharge he is doing reasonably well. Has light brown color stools. No nausea vomiting. He is known to have alcoholic liver disease, decompensated. 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 At present no signs of visible tumor seen in the esophagus. Patient is having significant ascites needing paracentesis on a weekly basis. He also had mild renal insufficiency and cannot diurese him aggressively. At present he is on spironolactone 25 mg a day and furosemide 40 mg a day. Past Medical,Family, and Social History reviewed and does contribute to the patient presenting condition. Assessment  1. Portal hypertension ([address]    2. Decompensated liver disease ([address]    3.  History of melena    4. History of malignant neoplasm of esophagus          Plan      discussed with the patient to follow low-sodium diet. tocontinue present medications. He may need EGD to evaluate angioectasia and may need further APC cauterization. May need transfusions if hemoglobin drops below 7. Monitor electrolytes. \"     NOTE: Most recent hospital admission noted 1-24-23--1-28-23 at [facility] r/t symptomatic anemia- see chart for further detail. PRE-PARACENTESIS QUESTIONNAIRE:   Last paracentesis date: 1-20-23. Amount removed: 3800 ml chylous appearing fluid obtained from RUQ. Tolerated well: Yes. Any complications: Denies. Albumin given: Denies. Interval between paracentesis dates: Typically weekly, states last week was cancelled d/t he didn't \"have enough fluid\"- hx of TIPS procedure. GI HX: See chart. Medications related to presenting condition: See chart. Taking as prescribed: States compliant. Shortness of breath: Denies. ABD distention: Denies. ABD pain: Denies. Nausea: Denies. Vomiting: Denies. Constipation/diarrhea: Denies. Fever/chills: Denies. Recent unintended weight gain: Denies- states maintaining. Leg swelling: B/l LE's, left hand/arm- states uncomfortable (states started a few years ago, intermittent issue- denies erythema/warmth- states PCP is aware). Also feels pressure to his feet, also states b/l shins are painful. Hx of scrotal swelling, states no longer an issue. Jaundice/scleral icterus: Denies. RECENT IMAGING R/T HPI   US LIVER     Result Date: 1/26/2023  Cirrhotic appearance of liver with a TIPS stent which appears grossly patent. Ascites. Small right pleural effusion. Cholelithiasis. XR CHEST PORTABLE     Result Date: 1/24/2023  1. Small left and trace right pleural effusions. 2.  Opacities in the left perihilar region and bilateral lung bases may represent a combination of edema and atelectasis. Superimposed infectious process is not excluded.       CT CHEST PULMONARY EMBOLISM W CONTRAST     Result Date: 1/7/2023  No evidence of pulmonary embolism. Moderate bilateral pleural effusion is associated with atelectatic changes of right lower lobe and lingula and left lower lobe. . Focal consolidative change within the anterior aspect of right upper lobe peripheral aspect of left upper lobe and medial aspect of right middle lobe are likely suggestive of multifocal pneumonia. Moderate pericardial effusion. Cirrhotic liver with TIPS, ascites and portal hypertension . Calcified thrombosed splenic artery measuring 3.1 cm. . RECOMMENDATIONS: Unavailable      IR US GUIDED PARACENTESIS     Result Date: 1/20/2023  Successful therapeutic paracentesis. IR US GUIDED PARACENTESIS     Result Date: 1/13/2023  Successful paracentesis      US GUIDED PARACENTESIS     Result Date: 1/6/2023  Successful ultrasound-guided paracentesis. Review of additional significant medical hx (see chart for additional detail, including current medications / see ROS for current s/s): CIRRHOSIS, COVID-19, ESOPHAGEAL CA (tx w/radiation and chemo), COVID-19, HEP. C (tx in the past), HLD, HTN, PORTAL HTN, SOB, THROMBOCYTOPENIA, HYPOTENSION, PERICARDIAL EFFUSION, Acute DVT of brachial vein of RUE, also Superficial venous thrombosis of the cephalic vein in the forearm (dx'd 1-7-23). CARDIAC TESTING REVIEW:   ECHO, 1-30-23:     CONCLUSIONS     Summary  Normal left ventricle size and function with an estimated EF 55-60%. No segmental wall motion abnormalities seen. Mild left ventricular hypertrophy. Normal diastolic filling. Right ventricular dilatation with normal systolic function. Mild mitral regurgitation. Normal right ventricular systolic pressure. Small posterior pericardial effusion.      Signature  ----------------------------------------------------------------------------   Electronically signed by Katerina Ramirez(Sonographer) on 01/30/2023 03:08 PM  ----------------------------------------------------------------------------     ----------------------------------------------------------------------------   Electronically signed by Cristel Bajwa(Interpreting physician) on   01/31/2023 08:32 AM     EKG, 1-28-23:      Narrative & Impression     Sinus rhythm with Premature atrial complexes  Low voltage QRS  Prolonged QT  Abnormal ECG  When compared with ECG of 28-JAN-2023 13:00, (unconfirmed)  No significant change was found      Specimen Collected: 01/28/23 13:01 EST Last Resulted: 01/31/23 09:07 EST          NPO status: Patient ate and drank this morning. Medications taken TODAY: Patient states he took all of his morning AM medications. Blood thinners: None. Personal hx of blood clots: Acute DVT of brachial vein of RUE, also Superficial venous thrombosis of the cephalic vein in the forearm (dx'd 1-7-23). Denies personal hx of MRSA infection. Personal or family hx of previous complications w/anesthesia: PONV w/patient.   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 Ritika Umaña, orthopedic, every 3-4 months, gets steroid injection    Lezama esophagus     Bleeding gastric varices 12/29/2022    BPH (benign prostatic hypertrophy)     Cholelithiasis     Cirrhosis ([facility]     COVID-19 12/2020    pt reports he had a positive test while at [facility] in 2020, was asymptomatic    COVID-19 vaccine series completed 5/20/2021, 6/22/2021    Moderna 5/20/2021, 6/22/2021    DDD (degenerative disc disease), lumbar     Depression     Esophageal cancer ([facility]     INVASIVE ADENOCARCINOMA ARISING IN TUBULAR ADENOMA WITH HIGH GRADE DYSPLASIA, ASSOCIATED WITH FOCAL INTESTINAL METAPLASIA     Esophageal varices (HCC)     Fatty liver     GERD (gastroesophageal reflux disease)     GI bleed     Heart murmur     Hep C w/o coma, chronic ([address]     History of alcohol abuse     6-12 beers a day; quit drinking 2019    History of blood transfusion     History of colon polyps 2016    History of tobacco abuse     [place] (hard of hearing)     Hyperlipidemia     Hypertension     Hyponatremia 07/20/2016    Hypotension 12/20/2021    Mastoid disorder, bilateral 12/31/2022    biLateral mastoid effusion    Pericardial effusion     PONV (postoperative nausea and vomiting)     Poor venous access     has port in place.     Port-A-Cath in place     right upper chest    Portal hypertension ([address]     Sciatica     Secondary esophageal varices ([address] 06/07/2022    Shortness of breath     Spinal stenosis     Stomach ulcer     hx of    Thrombocytopenia ([address] 12/23/2020    Tubular adenoma of colon 2016, 2018    Vitamin D deficiency     Wears glasses        SURGICAL HISTORY       Past Surgical History:   Procedure Laterality Date    BUNIONECTOMY      twice on right side    BUNIONECTOMY Left     CARPAL TUNNEL RELEASE Right     COLONOSCOPY      at age 35    COLONOSCOPY  10/05/2016    polyps-pathology tubular adenoma, and abnormal looking mucosa right colon-pathology-tubular adenoma    COLONOSCOPY N/A 03/30/2018    COLONOSCOPY POLYPECTOMY COLD BIOPSY performed by Asia Rondon MD at [address]  03/30/2018    Small polyp in the sigmoid colon and excised with biopsy forceps--tubular adenoma    COLONOSCOPY N/A 04/16/2022    COLONOSCOPY POLYPECTOMY performed by Asia Rondon MD at [facility], DIAGNOSTIC      EGD    ESOPHAGOGASTRODUODENOSCOPY  12/29/2022    ESOPHAGOGASTRODUODENOSCOPY N/A 01/03/2023    IR PORT PLACEMENT EQUAL OR GREATER THAN 5 YEARS  04/19/2021    IR PORT PLACEMENT EQUAL OR GREATER THAN 5 YEARS 4/19/2021 [facility]    IR TIPS INSERTION  12/01/2022    IR TIPS INSERTION 12/1/2022 Adeline Arceo MD [facility]    KNEE SURGERY Left     cyst removed    NASAL SEPTUM SURGERY      NERVE BLOCK Right 11/23/2020    NERVE BLOCK RIGHT CERVICAL STEROID INJECTION  C3-C6 performed by Gabino Stover MD at [facility]  01/04/2016    steroid injection C7 T1    OTHER SURGICAL HISTORY  11/21/2016    Bilateral Lumbar CACHORRO L4-L5 injections    OTHER SURGICAL HISTORY  12/19/2016    lumbar steroid injection    OTHER SURGICAL HISTORY  09/28/2018    BILATERAL L5 CACHORRO (N/A Back)    OTHER SURGICAL HISTORY Right 11/23/2020    cervical injection    PAIN MANAGEMENT PROCEDURE Left 07/09/2020    EPIDURAL STEROID INJECTION LEFT L4 L5 performed by Gabino Stover MD at [facility] Left 07/20/2020    LEFT L4 L5 EPIDURAL STEROID INJECTION performed by Gabino Stover MD at [facility] Bilateral 08/17/2020    LUMBAR FACET BILATERAL L2-L5 performed by Gabino Stover MD at [facility] Bilateral 12/07/2020    NERVE BLOCK BILATERAL LUMBAR MEDIAL BRANCH L2-L5 performed by Gabino Stover MD at [address] [facility] NEUROPLASTY &/TRANSPOS MEDIAN NRV CARPAL TUNNE Right 08/29/2017    CARPAL TUNNEL RELEASE RIGHT performed by Niko éPrez MD at [address] [facility] &/TRANSPOS MEDIAN NRV CARPAL TUNNE Left 10/31/2017    CARPAL TUNNEL RELEASE performed by Niko Pérez MD at [facility] [address] AA&/STRD TFRML EPI LUMBAR/SACRAL 1 LEVEL Bilateral 09/06/2018    BILATERAL L5 CACHORRO performed by Gabino Stover MD at [facility] [address] AA&/STRD TFRML EPI LUMBAR/SACRAL 1 LEVEL N/A 09/28/2018    BILATERAL L5 CACHORRO performed by Gabino Stover MD at [id number] [place] [address] N/A 12/29/2020    EGD BIOPSY performed by Lenore Robert MD at [address] [facility] N/A 02/02/2021 EGD BIOPSY and spot marking performed by Liya Milton MD at [address] [place] [address] 02/12/2021    ENDOSCOPIC ULTRASOUND, EGD performed by Mk Lamar MD at [facility] 1  02/12/2021    EGD DIAGNOSTIC ONLY performed by Mk Lamar MD at [facility] 1 08/31/2021    EGD BIOPSY performed by Liya Milton MD at [address] [place] [address] 01/21/2022    EGD BIOPSY performed by Liya Milton MD at [address] [place] [address] N/A 04/15/2022    EGD ESOPHAGOGASTRODUODENOSCOPY performed by Albert Mccord MD at [address] [place] [address] 06/06/2022    EGD BAND LIGATION performed by Gil Espinoza MD at [address] [place] [address] N/A 06/09/2022    EGD ESOPHAGOGASTRODUODENOSCOPY performed by Gil Espinoza MD at [address] [place] [address] N/A 09/14/2022    EGD ESOPHAGOGASTRODUODENOSCOPY ENDOSCOPIC APC AT [address] performed by Jamal Dooley MD at [address] [place] [address] 10/19/2022    EGD BIOPSY performed by Liya Milton MD at [address] [place] [address] 10/31/2022    EGD with APC performed by Liya Milton MD at [address] [place] [address]  12/29/2022    EGD ESOPHAGOGASTRODUODENOSCOPY performed by Mk Lamar MD at [address] [place] [address] 01/03/2023    EGD ESOPHAGOGASTRODUODENOSCOPY performed by Davida Marina MD at [address] [place] [address] N/A 01/26/2023    EGD CONTROL HEMORRHAGE performed by Liya Milton MD at [address] [place] [address] History     Socioeconomic History    Marital status: Single     Spouse name: None    Number of children: None    Years of education: None    Highest education level: None Tobacco Use    Smoking status: Former     Packs/day: 1.00     Years: 45.00     Pack years: 45.00     Types: Cigarettes     Quit date: 2017     Years since quittin.0    Smokeless tobacco: Never   Vaping Use    Vaping Use: Never used   Substance and Sexual Activity    Alcohol use: Not Currently     Comment: Quit alcohol in 2019- heavier drinking prior to quitting    Drug use: Not Currently     Frequency: 1.0 times per week     Types: Cocaine     Comment: Cocaine- stopped spring 2016    Sexual activity: Yes     Partners: Female   Social History Narrative     in the past, retired     Social Determinants of Health     Financial Resource Strain: Low Risk     Difficulty of Paying Living Expenses: Not hard at all   Food Insecurity: No Food Insecurity    Worried About [address] [employer] in the Last Year: Never true    [address] [employer] in the Last Year: Never true   Physical Activity: Inactive    Days of Exercise per Week: 0 days    Minutes of Exercise per Session: 0 min       REVIEW OF SYSTEMS    No Known Allergies    Current Outpatient Medications on File Prior to Encounter   Medication Sig Dispense Refill    spironolactone (ALDACTONE) 25 MG tablet Take 1 tablet by mouth daily 30 tablet 3    sucralfate (CARAFATE) 1 GM tablet Take 1 tablet by mouth 4 times daily 120 tablet 3    lactulose (CHRONULAC) 10 GM/15ML solution take 30 milliliters by mouth three times a day 473 mL 1    furosemide (LASIX) 40 MG tablet Take 1 tablet by mouth in the morning and 1 tablet in the evening. 60 tablet 3    pantoprazole (PROTONIX) 40 MG tablet Take 40 mg by mouth daily      FEROSUL 325 (65 Fe) MG tablet take 1 tablet by mouth twice a day 60 tablet 5    atorvastatin (LIPITOR) 20 MG tablet Take 1 tablet by mouth nightly 30 tablet 3     No current facility-administered medications on file prior to encounter. Review of Systems   Constitutional:  Negative for chills and fever.    HENT:  Negative for congestion, ear pain, rhinorrhea, sore throat and trouble swallowing. Respiratory:  Negative for cough, shortness of breath and wheezing. Cardiovascular:  Positive for leg swelling (States was improving, recently worsened again- states LLE worse than RLE (chronic issue)). Negative for chest pain and palpitations. Gastrointestinal:  Negative for abdominal pain, anal bleeding, blood in stool, constipation, diarrhea, nausea and vomiting. See HPI. Genitourinary:  Negative for dysuria and frequency. Musculoskeletal:  Positive for arthralgias (Hx of b/l shin pain). Neurological:  Negative for dizziness and headaches. Hematological:  Bruises/bleeds easily (Bruising easily). GENERAL PHYSICAL EXAM     Vitals: BP (!) 145/69   Pulse 82   Temp 97.1 °F (36.2 °C) (Infrared)   Resp 16   Ht 5' 10\" (1.778 m)   Wt 210 lb (95.3 kg)   SpO2 100%   BMI 30.13 kg/m²      GENERAL APPEARANCE:   Hardik Yang is 60 y.o.,  male, mildly obese, nourished, conscious, alert. Does not appear to be in any distress or pain at this time. Physical Exam  Vitals reviewed. Constitutional:       General: He is not in acute distress. Appearance: He is well-developed. He is not ill-appearing, toxic-appearing or diaphoretic. HENT:      Head: Normocephalic. Right Ear: External ear normal.      Left Ear: External ear normal.      Nose: Nose normal.      Mouth/Throat:      Dentition: Abnormal dentition (+ missing teeth, decay). Pharynx: No oropharyngeal exudate or posterior oropharyngeal erythema. Tonsils: No tonsillar abscesses. Eyes:      General: No scleral icterus. Right eye: No discharge. Left eye: No discharge. Conjunctiva/sclera: Conjunctivae normal.      Pupils: Pupils are equal, round, and reactive to light. Comments: + glasses. Cardiovascular:      Rate and Rhythm: Normal rate and regular rhythm. Pulses: Intact distal pulses.            Radial pulses are 2+ on the right side and 2+ on the left side. Heart sounds: Murmur heard. Pulmonary:      Effort: No accessory muscle usage or respiratory distress. Breath sounds: Normal breath sounds. No decreased breath sounds, wheezing, rhonchi or rales. Comments: Patient does appear slightly SOB upon presentation to H&P room, improved w/rest.   Abdominal:      General: Abdomen is protuberant. Bowel sounds are normal. There is distension. Palpations: Abdomen is rigid. Tenderness: There is no abdominal tenderness. There is no guarding or rebound. Hernia: A hernia (Non-tender) is present. Hernia is present in the umbilical area. Musculoskeletal:      Right forearm: No swelling. Left forearm: Swelling present. Left wrist: Swelling present. Right hand: Normal strength. Left hand: Swelling present. Normal strength. Normal sensation. Normal capillary refill. Normal pulse. Right lower leg: No tenderness (Denies shin pain on assessment). 2+ Pitting Edema present. Left lower leg: No tenderness (Denies shin pain on assessment). 2+ Pitting Edema (LLE slightly worse than RLE (patient states chronic), no erythema/no warmth) present. Comments: Negative Sebastien's sign b/l (performed in sitting position). No erythema, warmth noted to examined LUE. Lymphadenopathy:      Cervical: No cervical adenopathy. Skin:     General: Skin is warm and dry. Coloration: Skin is not jaundiced. Comments: Subcutaneous port to right upper chest.   Neurological:      Mental Status: He is alert and oriented to person, place, and time.    Psychiatric:         Behavior: Behavior normal.       RECENT LAB WORK     Lab Results   Component Value Date     (L) 01/27/2023    K 3.9 01/27/2023    CL 99 01/27/2023    CO2 26 01/27/2023    BUN 10 01/27/2023    CREATININE 0.67 (L) 01/27/2023    GLUCOSE 137 (H) 01/27/2023    CALCIUM 8.0 (L) 01/27/2023    PROT 5.0 (L) 01/24/2023    LABALBU 2.4 (L) 01/24/2023    BILITOT 1.5 (H) 01/24/2023    ALKPHOS 197 (H) 01/24/2023    AST 69 (H) 01/24/2023    ALT 25 01/24/2023    LABGLOM >60 01/27/2023    GFRAA >60 09/28/2022    GLOB NOT REPORTED 08/19/2021     Lab Results   Component Value Date    WBC 5.9 01/27/2023    HGB 8.3 (L) 01/30/2023    HCT 27.3 (L) 01/30/2023    MCV 84.3 01/27/2023    PLT 99 (L) 01/27/2023     Lab Results   Component Value Date    INR 1.4 01/24/2023    INR 1.6 01/03/2023    INR 1.4 01/02/2023    PROTIME 17.0 (H) 01/24/2023    PROTIME 16.0 (H) 01/03/2023    PROTIME 14.7 (H) 01/02/2023     Lab Results   Component Value Date    APTT 35.9 (H) 01/02/2023     Lab Results   Component Value Date/Time    MG 1.7 01/24/2023 10:00 PM      PROVISIONAL DIAGNOSES / PROCEDURE:      Alcoholic cirrhosis of liver with ascites     IR US GUIDED PARACENTESIS     Patient Active Problem List    Diagnosis Date Noted    Anasarca 01/09/2023    Chronic hepatitis C without hepatic coma ([address] 01/08/2023    Swelling of joint of upper arm, right 01/08/2023    Acute deep vein thrombosis (DVT) of brachial vein of right upper extremity ([address] 01/07/2023    Lezama's esophagus with dysplasia 12/30/2022    S/P TIPS (transjugular intrahepatic portosystemic shunt) 12/01/2022    Goals of care, counseling/discussion 10/31/2022    ACP (advance care planning) 10/31/2022    Palliative care encounter 10/31/2022    GI bleed 09/13/2022    Esophageal polyp 06/07/2022    Drop in hemoglobin 06/03/2022    Shortness of breath     Ascites due to alcoholic cirrhosis ([address] 37/93/0216    Acute kidney failure, unspecified ([address] 04/21/2022    Muscle weakness (generalized) 07/28/2016    Other abnormalities of gait and mobility 07/28/2016    Mastoid disorder, bilateral 12/31/2022    Anemia 04/13/2022    Acute kidney injury ([facility] 04/13/2022    Esophageal adenocarcinoma (HCC)     Low hemoglobin 12/20/2021    Symptomatic anemia, microcytic, acute 12/20/2021    Hypotension 12/20/2021    Former smoker, 50+ pack years, quit 2016 12/20/2021    HLD (hyperlipidemia) 12/20/2021    Abnormal findings on diagnostic imaging of spine 12/14/2021    Cervical spinal stenosis 12/14/2021    Spinal stenosis of lumbar region with neurogenic claudication 12/14/2021    Severe comorbid illness 11/30/2021    Gait instability 11/30/2021    Current smoker 04/05/2021    COVID-19 02/23/2021    Anxiety 02/23/2021    Malignant neoplasm of lower third of esophagus ([address]     Hypocalcemia 12/26/2020    Hypophosphatemia 12/26/2020    Alcohol abuse     Altered mental status     Thrombocytopenia ([address] 12/23/2020    Hepatitis C virus infection resolved after antiviral drug therapy 12/23/2020    Cervical facet syndrome 11/23/2020    Lumbar facet arthropathy 08/17/2020    Elevated LFTs 08/12/2020    Seasonal allergies 08/12/2020    S/P epidural steroid injection 08/05/2020    Essential hypertension 04/24/2019    Recurrent major depressive disorder in partial remission ([address] 04/24/2019    Pure hypercholesterolemia 02/04/2019    Acute hypokalemia 02/04/2019    Vitamin D deficiency 09/20/2017    History of hepatitis C 09/11/2017    Ganglion cyst 05/31/2017    Carpal tunnel syndrome of right wrist 05/31/2017    Tinnitus 03/23/2017    Eustachian tube dysfunction 03/23/2017    Lumbar radiculitis 11/08/2016    Lumbar disc herniation 11/08/2016    Gynecomastia, male 10/26/2016    Depression 10/13/2016    Vertebrogenic low back pain 10/06/2016    DDD (degenerative disc disease), lumbar 10/06/2016    Decompensation of cirrhosis of liver ([address] 09/15/2016    Psychophysiologic insomnia 09/14/2016    Cirrhosis ([address]     Hep C w/o coma, chronic (HCC)     Fatty liver     Calculus of gallbladder without cholecystitis 08/10/2016    Chronic viral hepatitis B without delta agent and without coma ([address] 07/22/2016    Hypomagnesemia     Cervical radicular pain 01/04/2016    Tubular adenoma of colon 01/01/2016    History of colon polyps 01/01/2016    Back pain, chronic 04/19/2012 Hearing difficulty 04/19/2012    GERD (gastroesophageal reflux disease) 04/19/2012           MASSIMO Valverde - CNP on 2/3/2023 at 12:46 PM

## 2023-02-03 NOTE — BRIEF OP NOTE
Brief Postoperative Note    Heidi Quinteros  YOB: 1960  [id number]    Pre-operative Diagnosis: Recurrent ascites; abdominal discomfort    Post-operative Diagnosis: Same    Procedure: US guided paracentesis     Anesthesia: Local    Surgeons/Assistants: Gregg    Estimated Blood Loss: less than 50     Complications: None    Specimens: Was Not Obtained     Electronically signed by Kenzie Enriquez MD on 2/3/2023 at 1:40 PM

## 2023-02-03 NOTE — PROGRESS NOTES
Pre-op Instructions For Out-Patient Endoscopy Surgery    Medication Instructions:  Please stop herbs and any supplements now (includes vitamins and minerals). Please contact your surgeon and prescribing physician for pre-op instructions for any blood thinners. If you have inhalers/aerosol treatments at home, please use them the morning of your surgery and bring the inhalers with you to the hospital.    Please take the following medications the morning of your surgery with a sip of water:    None. Surgery Instructions:  After midnight before surgery:  Do not eat or drink anything, including water, mints, gum, and hard candy. You may brush your teeth without swallowing. No smoking, chewing tobacco, or street drugs. Please shower or bathe before surgery. Please do not wear any cologne, lotion, powder, jewelry, piercings, perfume, makeup, nail polish, hair accessories, or hair spray on the day of surgery. Wear loose comfortable clothing. Leave your valuables at home. Bring a storage case for any glasses/contacts. An adult who is responsible for you MUST drive you home and should be with you for the first 24 hours after surgery. The Day of Surgery:  Arrive at [facility] at the time directed by your surgeon and check in at the desk. If you have a living will or healthcare power of , please bring a copy. You will be taken to the pre-op holding area where you will be prepared for surgery. A physical assessment will be performed by a nurse practitioner or house officer. Your IV will be started and you will meet your anesthesiologist.    When you go to surgery, your family will be directed to the surgical waiting room, where the doctor should speak with them after your surgery. After surgery, you will be taken to the recovery room then when you are awake and stable you will go to the short stay unit for preparation to be discharged. Instructions read to Baldomero Burroughs and understanding verbalized.

## 2023-02-03 NOTE — H&P
HISTORY and [address]       NAME:  Nancy Monsalve  MRN: [id number]   YOB: 1960   Date: 2/3/2023   Age: 60 y.o. Gender: male     COMPLAINT AND PRESENT HISTORY:   Nancy Monsalve is 60 y.o.,  male, undergoing IR [address] for Alcoholic cirrhosis of liver with ascites per Dr. Vi Rogers. HPI:  SEE PORTION OF NOTE BELOW PER DR. GALAN, 2-1-23 (REVIEWED):       [id number] Complaint   Patient presents with    Cirrhosis       Pt here for f/u from [place] [address]. Pt had egd done while in the hosp. 1. Portal hypertension ([address]    2. Decompensated liver disease ([address]    3. History of melena    4. History of malignant neoplasm of esophagus       HISTORY OF PRESENT ILLNESS:   Patient seen along with his wife. Last week patient was at [facility] with anemia. EGD revealed diffuse oozing of blood from lower esophagus and hiatal hernia felt to be secondary to angioectasia. Patient had radiation therapy of lower esophagus and lateral hernia area that may be the etiology for this angioectasia. Patient had APC cauterization of the angioectasia. Patient did well and was discharged. Following discharge he is doing reasonably well. Has light brown color stools. No nausea vomiting. He is known to have alcoholic liver disease, decompensated. 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 At present no signs of visible tumor seen in the esophagus. Patient is having significant ascites needing paracentesis on a weekly basis. He also had mild renal insufficiency and cannot diurese him aggressively. At present he is on spironolactone 25 mg a day and furosemide 40 mg a day. Past Medical,Family, and Social History reviewed and does contribute to the patient presenting condition. Assessment  1. Portal hypertension ([address]    2. Decompensated liver disease ([address]    3.  History of melena    4. History of malignant neoplasm of esophagus          Plan      discussed with the patient to follow low-sodium diet. tocontinue present medications. He may need EGD to evaluate angioectasia and may need further APC cauterization. May need transfusions if hemoglobin drops below 7. Monitor electrolytes. \"     NOTE: Most recent hospital admission noted 1-24-23--1-28-23 at [facility] r/t symptomatic anemia- see chart for further detail. PRE-PARACENTESIS QUESTIONNAIRE:   Last paracentesis date: 1-20-23. Amount removed: 3800 ml chylous appearing fluid obtained from RUQ. Tolerated well: Yes. Any complications: Denies. Albumin given: Denies. Interval between paracentesis dates: Typically weekly, states last week was cancelled d/t he didn't \"have enough fluid\"- hx of TIPS procedure. GI HX: See chart. Medications related to presenting condition: See chart. Taking as prescribed: States compliant. Shortness of breath: Denies. ABD distention: Denies. ABD pain: Denies. Nausea: Denies. Vomiting: Denies. Constipation/diarrhea: Denies. Fever/chills: Denies. Recent unintended weight gain: Denies- states maintaining. Leg swelling: B/l LE's, left hand/arm- states uncomfortable (states started a few years ago, intermittent issue- denies erythema/warmth- states PCP is aware). Also feels pressure to his feet, also states b/l shins are painful. Hx of scrotal swelling, states no longer an issue. Jaundice/scleral icterus: Denies. RECENT IMAGING R/T HPI   US LIVER     Result Date: 1/26/2023  Cirrhotic appearance of liver with a TIPS stent which appears grossly patent. Ascites. Small right pleural effusion. Cholelithiasis. XR CHEST PORTABLE     Result Date: 1/24/2023  1. Small left and trace right pleural effusions. 2.  Opacities in the left perihilar region and bilateral lung bases may represent a combination of edema and atelectasis. Superimposed infectious process is not excluded.       CT CHEST PULMONARY EMBOLISM W CONTRAST     Result Date: 1/7/2023  No evidence of pulmonary embolism. Moderate bilateral pleural effusion is associated with atelectatic changes of right lower lobe and lingula and left lower lobe. . Focal consolidative change within the anterior aspect of right upper lobe peripheral aspect of left upper lobe and medial aspect of right middle lobe are likely suggestive of multifocal pneumonia. Moderate pericardial effusion. Cirrhotic liver with TIPS, ascites and portal hypertension . Calcified thrombosed splenic artery measuring 3.1 cm. . RECOMMENDATIONS: Unavailable      IR US GUIDED PARACENTESIS     Result Date: 1/20/2023  Successful therapeutic paracentesis. IR US GUIDED PARACENTESIS     Result Date: 1/13/2023  Successful paracentesis      US GUIDED PARACENTESIS     Result Date: 1/6/2023  Successful ultrasound-guided paracentesis. Review of additional significant medical hx (see chart for additional detail, including current medications / see ROS for current s/s): CIRRHOSIS, COVID-19, ESOPHAGEAL CA (tx w/radiation and chemo), COVID-19, HEP. C (tx in the past), HLD, HTN, PORTAL HTN, SOB, THROMBOCYTOPENIA, HYPOTENSION, PERICARDIAL EFFUSION, Acute DVT of brachial vein of RUE, also Superficial venous thrombosis of the cephalic vein in the forearm (dx'd 1-7-23). CARDIAC TESTING REVIEW:   ECHO, 1-30-23:     CONCLUSIONS     Summary  Normal left ventricle size and function with an estimated EF 55-60%. No segmental wall motion abnormalities seen. Mild left ventricular hypertrophy. Normal diastolic filling. Right ventricular dilatation with normal systolic function. Mild mitral regurgitation. Normal right ventricular systolic pressure. Small posterior pericardial effusion.      Signature  ----------------------------------------------------------------------------   Electronically signed by Katerina Ramirez(Sonographer) on 01/30/2023 03:08 PM  ----------------------------------------------------------------------------     ----------------------------------------------------------------------------   Electronically signed by Cristel Bajwa(Interpreting physician) on   01/31/2023 08:32 AM     EKG, 1-28-23:      Narrative & Impression     Sinus rhythm with Premature atrial complexes  Low voltage QRS  Prolonged QT  Abnormal ECG  When compared with ECG of 28-JAN-2023 13:00, (unconfirmed)  No significant change was found      Specimen Collected: 01/28/23 13:01 EST Last Resulted: 01/31/23 09:07 EST          NPO status: Patient ate and drank this morning. Medications taken TODAY: Patient states he took all of his morning AM medications. Blood thinners: None. Personal hx of blood clots: Acute DVT of brachial vein of RUE, also Superficial venous thrombosis of the cephalic vein in the forearm (dx'd 1-7-23). Denies personal hx of MRSA infection. Personal or family hx of previous complications w/anesthesia: PONV w/patient.   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 Hong Castro, orthopedic, every 3-4 months, gets steroid injection    Lezama esophagus     Bleeding gastric varices 12/29/2022    BPH (benign prostatic hypertrophy)     Cholelithiasis     Cirrhosis ([facility]     COVID-19 12/2020    pt reports he had a positive test while at [facility] in 2020, was asymptomatic    COVID-19 vaccine series completed 5/20/2021, 6/22/2021    Moderna 5/20/2021, 6/22/2021    DDD (degenerative disc disease), lumbar     Depression     Esophageal cancer ([facility]     INVASIVE ADENOCARCINOMA ARISING IN TUBULAR ADENOMA WITH HIGH GRADE DYSPLASIA, ASSOCIATED WITH FOCAL INTESTINAL METAPLASIA     Esophageal varices (HCC)     Fatty liver     GERD (gastroesophageal reflux disease)     GI bleed     Heart murmur     Hep C w/o coma, chronic ([address]     History of alcohol abuse     6-12 beers a day; quit drinking 2019    History of blood transfusion     History of colon polyps 2016    History of tobacco abuse     Hughes (hard of hearing)     Hyperlipidemia     Hypertension     Hyponatremia 07/20/2016    Hypotension 12/20/2021    Mastoid disorder, bilateral 12/31/2022    biLateral mastoid effusion    Pericardial effusion     PONV (postoperative nausea and vomiting)     Poor venous access     has port in place.     Port-A-Cath in place     right upper chest    Portal hypertension ([address]     Sciatica     Secondary esophageal varices ([address] 06/07/2022    Shortness of breath     Spinal stenosis     Stomach ulcer     hx of    Thrombocytopenia ([address] 12/23/2020    Tubular adenoma of colon 2016, 2018    Vitamin D deficiency     Wears glasses        SURGICAL HISTORY       Past Surgical History:   Procedure Laterality Date    BUNIONECTOMY      twice on right side    BUNIONECTOMY Left     CARPAL TUNNEL RELEASE Right     COLONOSCOPY      at age 35    COLONOSCOPY  10/05/2016    polyps-pathology tubular adenoma, and abnormal looking mucosa right colon-pathology-tubular adenoma    COLONOSCOPY N/A 03/30/2018    COLONOSCOPY POLYPECTOMY COLD BIOPSY performed by Sony Roes MD at [address] [place] [address]  03/30/2018    Small polyp in the sigmoid colon and excised with biopsy forceps--tubular adenoma    COLONOSCOPY N/A 04/16/2022    COLONOSCOPY POLYPECTOMY performed by Sony Rose MD at [facility], DIAGNOSTIC      EGD    ESOPHAGOGASTRODUODENOSCOPY  12/29/2022    ESOPHAGOGASTRODUODENOSCOPY N/A 01/03/2023    IR PORT PLACEMENT EQUAL OR GREATER THAN 5 YEARS  04/19/2021    IR PORT PLACEMENT EQUAL OR GREATER THAN 5 YEARS 4/19/2021 [facility]    IR TIPS INSERTION  12/01/2022    IR TIPS INSERTION 12/1/2022 Shelli Aceves MD [facility]    KNEE SURGERY Left     cyst removed    NASAL SEPTUM SURGERY      NERVE BLOCK Right 11/23/2020    NERVE BLOCK RIGHT CERVICAL STEROID INJECTION  C3-C6 performed by Dori Mukherjee MD at [facility]  01/04/2016    steroid injection C7 T1    OTHER SURGICAL HISTORY  11/21/2016    Bilateral Lumbar CACHORRO L4-L5 injections    OTHER SURGICAL HISTORY  12/19/2016    lumbar steroid injection    OTHER SURGICAL HISTORY  09/28/2018    BILATERAL L5 CACHORRO (N/A Back)    OTHER SURGICAL HISTORY Right 11/23/2020    cervical injection    PAIN MANAGEMENT PROCEDURE Left 07/09/2020    EPIDURAL STEROID INJECTION LEFT L4 L5 performed by Dori Mukherjee MD at [facility] Left 07/20/2020    LEFT L4 L5 EPIDURAL STEROID INJECTION performed by Dori Mukherjee MD at [facility] Bilateral 08/17/2020    LUMBAR FACET BILATERAL L2-L5 performed by Dori Mukherjee MD at [facility] Bilateral 12/07/2020    NERVE BLOCK BILATERAL LUMBAR MEDIAL BRANCH L2-L5 performed by Dori Mukherjee MD at [address] [employer] [facility]/TRANSPOS MEDIAN NRV CARPAL TUNNE Right 08/29/2017    CARPAL TUNNEL RELEASE RIGHT performed by Sara Brenner MD at [address] [employer] [facility]/[employer] CARPAL TUNNE Left 10/31/2017    CARPAL TUNNEL RELEASE performed by Sara Brenner MD at [facility] [address] AA&/STRD TFRML EPI LUMBAR/SACRAL 1 LEVEL Bilateral 09/06/2018    BILATERAL L5 CACHORRO performed by Dori Mukherjee MD at [facility] [address] AA&/STRD TFRML EPI LUMBAR/SACRAL 1 LEVEL N/A 09/28/2018    BILATERAL L5 CACHORRO performed by Dori Mukherjee MD at [address] [facility] N/A 12/29/2020    EGD BIOPSY performed by Judson George MD at [address] N/A 02/02/2021 EGD BIOPSY and spot marking performed by Booker Bailey MD at [address] [place] [address] 02/12/2021    ENDOSCOPIC ULTRASOUND, EGD performed by Sanchez Lanza MD at [place] [address]  02/12/2021    EGD DIAGNOSTIC ONLY performed by Sanchez Lanza MD at [place] [address] 08/31/2021    EGD BIOPSY performed by Booker Bailey MD at [address] [place] [address] 01/21/2022    EGD BIOPSY performed by Booker Bailey MD at [address] [place] [address] N/A 04/15/2022    EGD ESOPHAGOGASTRODUODENOSCOPY performed by Kiki Rabago MD at [place] [address]. 06/06/2022    EGD BAND LIGATION performed by Todd Acevedo MD at [address] [place] [address] N/A 06/09/2022    EGD ESOPHAGOGASTRODUODENOSCOPY performed by Todd Acevedo MD at [address] [place] [address] N/A 09/14/2022    EGD ESOPHAGOGASTRODUODENOSCOPY ENDOSCOPIC APC AT [place] [address] performed by Clarke Anders MD at [address] [place] [address] 10/19/2022    EGD BIOPSY performed by Booker Bailey MD at [address] [place] [address] 10/31/2022    EGD with APC performed by Booker Bailey MD at [address] [place] [address]  12/29/2022    EGD ESOPHAGOGASTRODUODENOSCOPY performed by Sanchez Lanza MD at [place] [address]. 01/03/2023    EGD ESOPHAGOGASTRODUODENOSCOPY performed by Serene Begum MD at [address] [place] [address] N/A 01/26/2023    EGD CONTROL HEMORRHAGE performed by Booker Bailey MD at [address] [place] [address]     Socioeconomic History    Marital status: Single     Spouse name: None    Number of children: None    Years of education: None    Highest education level: None Tobacco Use    Smoking status: Former     Packs/day: 1.00     Years: 45.00     Pack years: 45.00     Types: Cigarettes     Quit date: 2017     Years since quittin.0    Smokeless tobacco: Never   Vaping Use    Vaping Use: Never used   Substance and Sexual Activity    Alcohol use: Not Currently     Comment: Quit alcohol in 2019- heavier drinking prior to quitting    Drug use: Not Currently     Frequency: 1.0 times per week     Types: Cocaine     Comment: Cocaine- stopped spring 2016    Sexual activity: Yes     Partners: Female   Social History Narrative     in the past, retired     Social Determinants of Health     Financial Resource Strain: Low Risk     Difficulty of Paying Living Expenses: Not hard at all   Food Insecurity: No Food Insecurity    Worried About [employer] in the Last Year: Never true    [employer] in the Last Year: Never true   Physical Activity: Inactive    Days of Exercise per Week: 0 days    Minutes of Exercise per Session: 0 min       REVIEW OF SYSTEMS    No Known Allergies    Current Outpatient Medications on File Prior to Encounter   Medication Sig Dispense Refill    spironolactone (ALDACTONE) 25 MG tablet Take 1 tablet by mouth daily 30 tablet 3    sucralfate (CARAFATE) 1 GM tablet Take 1 tablet by mouth 4 times daily 120 tablet 3    lactulose (CHRONULAC) 10 GM/15ML solution take 30 milliliters by mouth three times a day 473 mL 1    furosemide (LASIX) 40 MG tablet Take 1 tablet by mouth in the morning and 1 tablet in the evening. 60 tablet 3    pantoprazole (PROTONIX) 40 MG tablet Take 40 mg by mouth daily      FEROSUL 325 (65 Fe) MG tablet take 1 tablet by mouth twice a day 60 tablet 5    atorvastatin (LIPITOR) 20 MG tablet Take 1 tablet by mouth nightly 30 tablet 3     No current facility-administered medications on file prior to encounter. Review of Systems   Constitutional:  Negative for chills and fever.    HENT:  Negative for congestion, ear pain, rhinorrhea, sore throat and trouble swallowing. Respiratory:  Negative for cough, shortness of breath and wheezing. Cardiovascular:  Positive for leg swelling (States was improving, recently worsened again- states LLE worse than RLE (chronic issue)). Negative for chest pain and palpitations. Gastrointestinal:  Negative for abdominal pain, anal bleeding, blood in stool, constipation, diarrhea, nausea and vomiting. See HPI. Genitourinary:  Negative for dysuria and frequency. Musculoskeletal:  Positive for arthralgias (Hx of b/l shin pain). Neurological:  Negative for dizziness and headaches. Hematological:  Bruises/bleeds easily (Bruising easily). GENERAL PHYSICAL EXAM     Vitals: BP (!) 145/69   Pulse 82   Temp 97.1 °F (36.2 °C) (Infrared)   Resp 16   Ht 5' 10\" (1.778 m)   Wt 210 lb (95.3 kg)   SpO2 100%   BMI 30.13 kg/m²      GENERAL APPEARANCE:   Yaneth Akins is 60 y.o.,  male, mildly obese, nourished, conscious, alert. Does not appear to be in any distress or pain at this time. Physical Exam  Vitals reviewed. Constitutional:       General: He is not in acute distress. Appearance: He is well-developed. He is not ill-appearing, toxic-appearing or diaphoretic. HENT:      Head: Normocephalic. Right Ear: External ear normal.      Left Ear: External ear normal.      Nose: Nose normal.      Mouth/Throat:      Dentition: Abnormal dentition (+ missing teeth, decay). Pharynx: No oropharyngeal exudate or posterior oropharyngeal erythema. Tonsils: No tonsillar abscesses. Eyes:      General: No scleral icterus. Right eye: No discharge. Left eye: No discharge. Conjunctiva/sclera: Conjunctivae normal.      Pupils: Pupils are equal, round, and reactive to light. Comments: + glasses. Cardiovascular:      Rate and Rhythm: Normal rate and regular rhythm. Pulses: Intact distal pulses.            Radial pulses are 2+ on the right side and 2+ on the left side. Heart sounds: Murmur heard. Pulmonary:      Effort: No accessory muscle usage or respiratory distress. Breath sounds: Normal breath sounds. No decreased breath sounds, wheezing, rhonchi or rales. Comments: Patient does appear slightly SOB upon presentation to H&P room, improved w/rest.   Abdominal:      General: Abdomen is protuberant. Bowel sounds are normal. There is distension. Palpations: Abdomen is rigid. Tenderness: There is no abdominal tenderness. There is no guarding or rebound. Hernia: A hernia (Non-tender) is present. Hernia is present in the umbilical area. Musculoskeletal:      Right forearm: No swelling. Left forearm: Swelling present. Left wrist: Swelling present. Right hand: Normal strength. Left hand: Swelling present. Normal strength. Normal sensation. Normal capillary refill. Normal pulse. Right lower leg: No tenderness (Denies shin pain on assessment). 2+ Pitting Edema present. Left lower leg: No tenderness (Denies shin pain on assessment). 2+ Pitting Edema (LLE slightly worse than RLE (patient states chronic), no erythema/no warmth) present. Comments: Negative Sebastien's sign b/l (performed in sitting position). No erythema, warmth noted to examined LUE. Lymphadenopathy:      Cervical: No cervical adenopathy. Skin:     General: Skin is warm and dry. Coloration: Skin is not jaundiced. Comments: Subcutaneous port to right upper chest.   Neurological:      Mental Status: He is alert and oriented to person, place, and time.    Psychiatric:         Behavior: Behavior normal.       RECENT LAB WORK     Lab Results   Component Value Date     (L) 01/27/2023    K 3.9 01/27/2023    CL 99 01/27/2023    CO2 26 01/27/2023    BUN 10 01/27/2023    CREATININE 0.67 (L) 01/27/2023    GLUCOSE 137 (H) 01/27/2023    CALCIUM 8.0 (L) 01/27/2023    PROT 5.0 (L) 01/24/2023    LABALBU 2.4 (L) 01/24/2023    BILITOT 1.5 (H) 01/24/2023    ALKPHOS 197 (H) 01/24/2023    AST 69 (H) 01/24/2023    ALT 25 01/24/2023    LABGLOM >60 01/27/2023    GFRAA >60 09/28/2022    GLOB NOT REPORTED 08/19/2021     Lab Results   Component Value Date    WBC 5.9 01/27/2023    HGB 8.3 (L) 01/30/2023    HCT 27.3 (L) 01/30/2023    MCV 84.3 01/27/2023    PLT 99 (L) 01/27/2023     Lab Results   Component Value Date    INR 1.4 01/24/2023    INR 1.6 01/03/2023    INR 1.4 01/02/2023    PROTIME 17.0 (H) 01/24/2023    PROTIME 16.0 (H) 01/03/2023    PROTIME 14.7 (H) 01/02/2023     Lab Results   Component Value Date    APTT 35.9 (H) 01/02/2023     Lab Results   Component Value Date/Time    MG 1.7 01/24/2023 10:00 PM      PROVISIONAL DIAGNOSES / PROCEDURE:      Alcoholic cirrhosis of liver with ascites     IR US GUIDED PARACENTESIS     Patient Active Problem List    Diagnosis Date Noted    Anasarca 01/09/2023    Chronic hepatitis C without hepatic coma ([address] 01/08/2023    Swelling of joint of upper arm, right 01/08/2023    Acute deep vein thrombosis (DVT) of brachial vein of right upper extremity ([address] 01/07/2023    Lezama's esophagus with dysplasia 12/30/2022    S/P TIPS (transjugular intrahepatic portosystemic shunt) 12/01/2022    Goals of care, counseling/discussion 10/31/2022    ACP (advance care planning) 10/31/2022    Palliative care encounter 10/31/2022    GI bleed 09/13/2022    Esophageal polyp 06/07/2022    Drop in hemoglobin 06/03/2022    Shortness of breath     Ascites due to alcoholic cirrhosis ([address] 81/84/4582    Acute kidney failure, unspecified ([address] 04/21/2022    Muscle weakness (generalized) 07/28/2016    Other abnormalities of gait and mobility 07/28/2016    Mastoid disorder, bilateral 12/31/2022    Anemia 04/13/2022    Acute kidney injury [facility] 04/13/2022    Esophageal adenocarcinoma (HCC)     Low hemoglobin 12/20/2021    Symptomatic anemia, microcytic, acute 12/20/2021    Hypotension 12/20/2021    Former smoker, 50+ pack years, quit 2016 12/20/2021    HLD (hyperlipidemia) 12/20/2021    Abnormal findings on diagnostic imaging of spine 12/14/2021    Cervical spinal stenosis 12/14/2021    Spinal stenosis of lumbar region with neurogenic claudication 12/14/2021    Severe comorbid illness 11/30/2021    Gait instability 11/30/2021    Current smoker 04/05/2021    COVID-19 02/23/2021    Anxiety 02/23/2021    Malignant neoplasm of lower third of esophagus ([address]     Hypocalcemia 12/26/2020    Hypophosphatemia 12/26/2020    Alcohol abuse     Altered mental status     Thrombocytopenia ([address] 12/23/2020    Hepatitis C virus infection resolved after antiviral drug therapy 12/23/2020    Cervical facet syndrome 11/23/2020    Lumbar facet arthropathy 08/17/2020    Elevated LFTs 08/12/2020    Seasonal allergies 08/12/2020    S/P epidural steroid injection 08/05/2020    Essential hypertension 04/24/2019    Recurrent major depressive disorder in partial remission ([address] 04/24/2019    Pure hypercholesterolemia 02/04/2019    Acute hypokalemia 02/04/2019    Vitamin D deficiency 09/20/2017    History of hepatitis C 09/11/2017    Ganglion cyst 05/31/2017    Carpal tunnel syndrome of right wrist 05/31/2017    Tinnitus 03/23/2017    Eustachian tube dysfunction 03/23/2017    Lumbar radiculitis 11/08/2016    Lumbar disc herniation 11/08/2016    Gynecomastia, male 10/26/2016    Depression 10/13/2016    Vertebrogenic low back pain 10/06/2016    DDD (degenerative disc disease), lumbar 10/06/2016    Decompensation of cirrhosis of liver ([address] 09/15/2016    Psychophysiologic insomnia 09/14/2016    Cirrhosis ([address]     Hep C w/o coma, chronic (HCC)     Fatty liver     Calculus of gallbladder without cholecystitis 08/10/2016    Chronic viral hepatitis B without delta agent and without coma ([address] 07/22/2016    Hypomagnesemia     Cervical radicular pain 01/04/2016    Tubular adenoma of colon 01/01/2016    History of colon polyps 01/01/2016    Back pain, chronic 04/19/2012    Hearing difficulty 04/19/2012    GERD (gastroesophageal reflux disease) 04/19/2012           MASSIMO Vásquez - CNP on 2/3/2023 at 12:46 PM

## 2023-02-03 NOTE — DISCHARGE INSTRUCTIONS
DISCHARGE INSTRUCTIONS FOR PARACENTESIS    In order to continue your care at home, please follow the instructions below. Medications    Use over-the-counter pain medication as directed on bottle for pain control    Post Procedure Site/Care/Activity  For up to 2 days after the procedure, you may have a small amount of clear fluid coming out of the site where the needle was inserted, especially if you had a lot of fluid removed. You may need to change the bandage on the site. Do not lie totally flat until morning. You can do your normal activities after the procedure, unless instructed differently. Call your doctor or seek immediate medical care if:   You have symptoms of infection, such as increased pain, swelling, warmth, or redness, red streaks or pus. An oral temperature (by mouth) is 101 degrees or higher (fever), chills, fever. You are dizzy or lightheaded, or you feel like you may faint. You have new or worse belly pain. Watch closely for changes in your health, and be sure to contact your doctor if:   Fluid builds up in your belly again. You do not get better as expected.       IF YOU CANNOT REACH THE DOCTOR, GO TO THE NEAREST EMERGENCY ROOM OR CALL 911    Phone: Interventional Radiology   [phone]   Dr Ashlyn Pace  After hours Radiology   [phone]        2.4 Liters removed  Before procedure weight 210 pounds  After procedure weight 204 pounds

## 2023-02-03 NOTE — TELEPHONE ENCOUNTER
Marty Fernandez PA called office for update on correct Aldactone order for pt, as he had request for 50 mg.  Kaur Blakely NP stated she will review pt chart and update medications after she sees patients. Bassem Dubon is updated.

## 2023-02-03 NOTE — TELEPHONE ENCOUNTER
I have called and spoken with gastroenerology office who has explained situation to [place] in the office. They will fill the spironolactone order that is needed for patient. They have advised me to refuse the medications and they will take care of the diuretics for patient.

## 2023-02-03 NOTE — TELEPHONE ENCOUNTER
These medications are in direct relation to patients liver failure and ascites which is being managed by GI. This is a medication which they have been managing and would be inappropriate for me to fill since I am not managing his ascites/ paracentesis or electrolytes. Please call GI office to see why this patient was told to have this filled by PCP.

## 2023-02-06 NOTE — TELEPHONE ENCOUNTER
Patient advised that Daina Hannah spoke with Oniel Fernandez at Dr. Navin Pearl office. It was agreed that Dr. Kylie Mccall will fill this medication (Aldactone 50 mg). Advised patient to call pharmacy to see if they already filled it and if not to call Dr. Kylie Mccall to have it sent in. Voices understanding.

## 2023-02-07 ENCOUNTER — CARE COORDINATION (OUTPATIENT)
Dept: CASE MANAGEMENT | Age: 63
End: 2023-02-07

## 2023-02-07 DIAGNOSIS — K74.69 DECOMPENSATED LIVER DISEASE (HCC): ICD-10-CM

## 2023-02-07 RX ORDER — SPIRONOLACTONE 25 MG/1
50 TABLET ORAL DAILY
Qty: 30 TABLET | Refills: 3 | Status: SHIPPED | OUTPATIENT
Start: 2023-02-07

## 2023-02-07 NOTE — CARE COORDINATION
Care Transitions Outreach Attempt    Call within 2 business days of discharge: Yes   Attempted to reach patient for transitions of care follow up. Unable to reach patient. Patient: Ashley Roper Patient : 1960 MRN: [id number]    Last Discharge  Street       Date Complaint Diagnosis Description Type Department Provider    23 Abnormal Lab Anemia, unspecified type ED to Hosp-Admission (Discharged) (ADMITTED) Nusrat Jennings MD; Aguilar Odell. .. # 1 attempt-Attempted to reach patient for subsequent call. Left [employer] appropriate message with contact information requesting return call to [phone]     Was this an external facility discharge?  No Discharge Facility: [facility]    Noted following upcoming appointments from discharge chart review:   Risa [address] Dr follow up appointment(s):   Future Appointments   Date Time Provider Carolyn English   2/10/2023  1:00  [facility] IR  [facility] Radiolog   2023  1:15 PM Fredi Gonzalez MD [facility]   2023  1:00 PM [facility] Rad[facility]   2023  1:00 PM [facility] Radiolog   3/3/2023  1:00 PM [facility] Radi[facility]   3/10/2023  1:00 PM [facility] Radi[facility]   3/17/2023  1:00 PM [facility] Radiolog   3/24/2023  1:00 PM [facility] Radiolog   3/31/2023  1:00 PM [facility] Radi[facility]   2023  1:00 PM [facility]   2023  1:00 PM [facility]   2023  1:00 PM [facility]   2023  1:00 PM [facility]   2023  1:00 PM [facility]   2023  1:00 PM [facility]   2023  1:00 PM [facility]   2023  1:00 PM [facility]   2023  1:00 PM [facility]   6/9/2023  1:00 PM [facility] Rad[facility]   6/16/2023  1:00 PM [facility] Radi[facility]   6/23/2023  1:00 PM [facility] IR  [facility] Radiolog     [facility] follow up appointment(s):

## 2023-02-08 ENCOUNTER — CARE COORDINATION (OUTPATIENT)
Dept: CASE MANAGEMENT | Age: 63
End: 2023-02-08

## 2023-02-08 NOTE — CARE COORDINATION
[facility] Care Transitions Follow Up Call    Patient Current Location:  Home: [facility] [address] [place] [address]    Care Transition Nurse contacted the patient by telephone to follow up after admission on 23. Verified name and  with patient as identifiers. Patient: Elvia Monterroso  Patient : 1960   MRN: [id number]  Reason for Admission: Anemia  Discharge Date: 23 RARS: Readmission Risk Score: 32.7      Needs to be reviewed by the provider   Additional needs identified to be addressed with provider: No  none             Method of communication with provider: none. Spoke to [place] for transitions follow up call. Stated he is about to leave to go to GI appt. Pt is limiting Na+, stated he is avoiding alcohol, followed by palliative care, had initial consultation on 23. Stated he is taking Carafate 4 x daily as prescribed. Reviewed upcoming EGD scheduled for 23. Pt has transportation to procedure, ex-wife will take him. Had to end call to leave for appt. Addressed changes since last contact:  medications-taking as prescribed, has EGD on   Discussed follow-up appointments.      Follow Up  Future Appointments   Date Time Provider Carolyn English   2/10/2023  1:00 PM [facility] Radiolog   2023  1:15 PM Meli Rodriguez MD [facility]   2023  1:00 PM [facility]   2023  1:00 PM [facility] Radi[facility]   3/3/2023  1:00 PM [facility]   3/10/2023  1:00 PM [facility]   3/17/2023  1:00 PM [facility] Rad[facility]   3/24/2023  1:00 PM [facility]   3/31/2023  1:00 PM [facility]   2023  1:00 PM [facility]   2023  1:00 PM [facility] IR  [facility] Rad[facility]   2023  1:00 PM [facility] IR  [facility] Radiolog   2023  1:00 PM [facility] IR  [facility] Radiolog   5/5/2023  1:00 PM [facility] IR  [facility] Radi[facility]   5/12/2023  1:00 PM [facility] IR  [facility] Rad[facility]   5/19/2023  1:00 PM [facility]  [facility] Rad[facility]   5/26/2023  1:00 PM [facility]  [facility] Rad[facility]   6/2/2023  1:00 PM [facility]  [facility] Rad[facility]   6/9/2023  1:00 PM [facility] IR  [facility] Radiolog   6/16/2023  1:00 PM [facility] IR  [facility] Radiolog   6/23/2023  1:00 PM [facility] IR [address] Radiolog         Care Transition Nurse reviewed discharge instructions with patient and discussed any barriers to care and/or understanding of plan of care after discharge. Discussed appropriate site of care based on symptoms and resources available to patient including: PCP  Specialist  Home health  When to call [address] The patient agrees to contact the PCP office for questions related to their healthcare. Advance Care Planning:   reviewed and current.      Patients top risk factors for readmission: medical condition-Alcoholic cirrhosis of liver, esophageal CA, non compliance  Interventions to address risk factors: Obtained and reviewed discharge summary and/or continuity of care documents    Offered patient enrollment in the Remote Patient Monitoring (RPM) program for in-home monitoring: NA.     Care Transitions Subsequent and Final Call    Subsequent and Final Calls  Do you have any ongoing symptoms?: No  Have your medications changed?: No  Do you have any questions related to your medications?: No  Do you currently have any active services?: Yes  Are you currently active with any services?: Home Health  Do you have any needs or concerns that I can assist you with?: No  Identified Barriers: None  Care Transitions Interventions     Other Therapy Services: Completed      Other Services: Completed   Disease Association: Completed Palliative Care: Completed     Other Interventions: Care Transition Nurse provided contact information for future needs. Plan for follow-up call in 5-7 days based on severity of symptoms and risk factors. Plan for next call: symptom management-avoiding alcohol? Med compliance?  Palliative care review, labs  follow-up appointment-EGD review biopsy results, next appts    Hay Arteaga RN

## 2023-02-09 NOTE — PRE-PROCEDURE INSTRUCTIONS
No answer, left message ? yes                           Unable to leave message ? When were you told to arrive at hospital ?    Do you have a  ? Are you on any blood thinners ? If yes when did you stop taking ? Do you have your prep Rx filled and instruction ? N/a    Nothing to eat the day before , only clear liquids. n/a    Are you experiencing any covid symptoms ? Do you have any infections or rash we should be aware of ? Do you have the Hibiclens soap to use the night before and the morning of surgery ?n/a    Nothing to eat or drink after midnight, only a sip of water to take any medication instructed to take the night before. Wear comfortable clothing, leave any valuables at home, remove any jewelry and body piercing .

## 2023-02-10 ENCOUNTER — HOSPITAL ENCOUNTER (OUTPATIENT)
Dept: INTERVENTIONAL RADIOLOGY/VASCULAR | Age: 63
End: 2023-02-10
Payer: MEDICARE

## 2023-02-10 ENCOUNTER — ANESTHESIA EVENT (OUTPATIENT)
Dept: ENDOSCOPY | Age: 63
End: 2023-02-10
Payer: MEDICARE

## 2023-02-10 VITALS
BODY MASS INDEX: 29.02 KG/M2 | SYSTOLIC BLOOD PRESSURE: 138 MMHG | HEIGHT: 70 IN | WEIGHT: 202.7 LBS | OXYGEN SATURATION: 99 % | RESPIRATION RATE: 16 BRPM | HEART RATE: 91 BPM | DIASTOLIC BLOOD PRESSURE: 71 MMHG | TEMPERATURE: 97.1 F

## 2023-02-10 DIAGNOSIS — K70.31 ASCITES DUE TO ALCOHOLIC CIRRHOSIS (HCC): ICD-10-CM

## 2023-02-10 PROCEDURE — 76705 ECHO EXAM OF ABDOMEN: CPT

## 2023-02-10 RX ORDER — SODIUM CHLORIDE 0.9 % (FLUSH) 0.9 %
5-40 SYRINGE (ML) INJECTION PRN
Status: DISCONTINUED | OUTPATIENT
Start: 2023-02-10 | End: 2023-02-13 | Stop reason: HOSPADM

## 2023-02-10 RX ORDER — SODIUM CHLORIDE 9 MG/ML
INJECTION, SOLUTION INTRAVENOUS PRN
Status: DISCONTINUED | OUTPATIENT
Start: 2023-02-10 | End: 2023-02-13 | Stop reason: HOSPADM

## 2023-02-10 RX ORDER — SODIUM CHLORIDE 0.9 % (FLUSH) 0.9 %
5-40 SYRINGE (ML) INJECTION EVERY 12 HOURS SCHEDULED
Status: DISCONTINUED | OUTPATIENT
Start: 2023-02-10 | End: 2023-02-13 | Stop reason: HOSPADM

## 2023-02-10 ASSESSMENT — PAIN - FUNCTIONAL ASSESSMENT: PAIN_FUNCTIONAL_ASSESSMENT: NONE - DENIES PAIN

## 2023-02-10 NOTE — H&P
HISTORY and [address] [address]       NAME:  Baljinder Zhang  MRN: [id number]   YOB: 1960   Date: 2/10/2023   Age: 60 y.o. Gender: male     H&P Update Note    H&P from 02/03/2023 reviewed and updated, Patient examined. I concur with findings. Pt last paracentesis was 02/03/2023 with a total of 2.4 L removed. Pt has recurrent ascites. Pt s/p TIPS procedure in December, 2022. Pt has abdominal distention with ascites. Takes aldactone and lasix as prescribed. Pt has BLE and left hand edema. Pt follows low sodium diet. Takes medications as prescribed. Vitals: /71   Pulse 91   Temp 97.1 °F (36.2 °C) (Infrared)   Resp 16   Ht 5' 10\" (1.778 m)   Wt 202 lb 11.2 oz (91.9 kg)   SpO2 99%   BMI 29.08 kg/m²  Body mass index is 29.08 kg/m². Pt AAO x 3 in NAD. HRRR. No adventitious lung sounds. No respiratory distress. NPO p 0800. No medications taken this am. Denies taking anticoagulants or blood thinning medications. Denies chest pain/pressure, palpitations, SOB, recent URI, fever or chills. MASSIMO Mendez - CNP on 2/10/2023 at 12:14 PM       HISTORY and [address]         NAME:  Baljinder Zhang  MRN: [id number]   YOB: 1960   Date: 2/3/2023   Age: 60 y.o. Gender: male      COMPLAINT AND PRESENT HISTORY:   Baljinder Zhang is 60 y.o.,  male, undergoing IR [address] for Alcoholic cirrhosis of liver with ascites per Dr. Rhonda Acevedo. HPI:  SEE PORTION OF NOTE BELOW PER DR. GALAN, 2-1-23 (REVIEWED):          [id number] Complaint   Patient presents with    Cirrhosis       Pt here for f/u from [place] [address]. Pt had egd done while in the hosp. 1. Portal hypertension ([facility]    2. Decompensated liver disease ([facility]    3. History of melena    4. History of malignant neoplasm of esophagus       HISTORY OF PRESENT ILLNESS:   Patient seen along with his wife.     Last week patient was at [facility] with anemia. EGD revealed diffuse oozing of blood from lower esophagus and hiatal hernia felt to be secondary to angioectasia. Patient had radiation therapy of lower esophagus and lateral hernia area that may be the etiology for this angioectasia. Patient had APC cauterization of the angioectasia. Patient did well and was discharged. Following discharge he is doing reasonably well. Has light brown color stools. No nausea vomiting. He is known to have alcoholic liver disease, decompensated. 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 At present no signs of visible tumor seen in the esophagus. Patient is having significant ascites needing paracentesis on a weekly basis. He also had mild renal insufficiency and cannot diurese him aggressively. At present he is on spironolactone 25 mg a day and furosemide 40 mg a day. Past Medical,Family, and Social History reviewed and does contribute to the patient presenting condition. Assessment  1. Portal hypertension ([address]    2. Decompensated liver disease ([address]    3. History of melena    4. History of malignant neoplasm of esophagus          Plan      discussed with the patient to follow low-sodium diet. tocontinue present medications. He may need EGD to evaluate angioectasia and may need further APC cauterization. May need transfusions if hemoglobin drops below 7. Monitor electrolytes. \"     NOTE: Most recent hospital admission noted 1-24-23--1-28-23 at [facility] r/t symptomatic anemia- see chart for further detail. PRE-PARACENTESIS QUESTIONNAIRE:   Last paracentesis date: 1-20-23. Amount removed: 3800 ml chylous appearing fluid obtained from RUQ. Tolerated well: Yes. Any complications: Denies. Albumin given: Denies. Interval between paracentesis dates: Typically weekly, states last week was cancelled d/t he didn't \"have enough fluid\"- hx of TIPS procedure.    GI HX: See chart.  Medications related to presenting condition: See chart. Taking as prescribed: States compliant. Shortness of breath: Denies. ABD distention: Denies. ABD pain: Denies. Nausea: Denies. Vomiting: Denies. Constipation/diarrhea: Denies. Fever/chills: Denies. Recent unintended weight gain: Denies- states maintaining. Leg swelling: B/l LE's, left hand/arm- states uncomfortable (states started a few years ago, intermittent issue- denies erythema/warmth- states PCP is aware). Also feels pressure to his feet, also states b/l shins are painful. Hx of scrotal swelling, states no longer an issue. Jaundice/scleral icterus: Denies. RECENT IMAGING R/T HPI   US LIVER     Result Date: 1/26/2023  Cirrhotic appearance of liver with a TIPS stent which appears grossly patent. Ascites. Small right pleural effusion. Cholelithiasis. XR CHEST PORTABLE     Result Date: 1/24/2023  1. Small left and trace right pleural effusions. 2.  Opacities in the left perihilar region and bilateral lung bases may represent a combination of edema and atelectasis. Superimposed infectious process is not excluded. CT CHEST PULMONARY EMBOLISM W CONTRAST     Result Date: 1/7/2023  No evidence of pulmonary embolism. Moderate bilateral pleural effusion is associated with atelectatic changes of right lower lobe and lingula and left lower lobe. . Focal consolidative change within the anterior aspect of right upper lobe peripheral aspect of left upper lobe and medial aspect of right middle lobe are likely suggestive of multifocal pneumonia. Moderate pericardial effusion. Cirrhotic liver with TIPS, ascites and portal hypertension . Calcified thrombosed splenic artery measuring 3.1 cm. . RECOMMENDATIONS: Unavailable      IR US GUIDED PARACENTESIS     Result Date: 1/20/2023  Successful therapeutic paracentesis.       IR US GUIDED PARACENTESIS     Result Date: 1/13/2023  Successful paracentesis      US GUIDED PARACENTESIS     Result Date: 1/6/2023  Successful ultrasound-guided paracentesis. Review of additional significant medical hx (see chart for additional detail, including current medications / see ROS for current s/s): CIRRHOSIS, COVID-19, ESOPHAGEAL CA (tx w/radiation and chemo), COVID-19, HEP. C (tx in the past), HLD, HTN, PORTAL HTN, SOB, THROMBOCYTOPENIA, HYPOTENSION, PERICARDIAL EFFUSION, Acute DVT of brachial vein of RUE, also Superficial venous thrombosis of the cephalic vein in the forearm (dx'd 1-7-23). CARDIAC TESTING REVIEW:   ECHO, 1-30-23:     CONCLUSIONS     Summary  Normal left ventricle size and function with an estimated EF 55-60%. No segmental wall motion abnormalities seen. Mild left ventricular hypertrophy. Normal diastolic filling. Right ventricular dilatation with normal systolic function. Mild mitral regurgitation. Normal right ventricular systolic pressure. Small posterior pericardial effusion. Signature  ----------------------------------------------------------------------------   Electronically signed by Katerina Ramirez(Sonographer) on 01/30/2023 03:08   PM  ----------------------------------------------------------------------------     ----------------------------------------------------------------------------   Electronically signed by Cristel Bajwa(Interpreting physician) on   01/31/2023 08:32 AM     EKG, 1-28-23:        Narrative & Impression     Sinus rhythm with Premature atrial complexes  Low voltage QRS  Prolonged QT  Abnormal ECG  When compared with ECG of 28-JAN-2023 13:00, (unconfirmed)  No significant change was found      Specimen Collected: 01/28/23 13:01 EST Last Resulted: 01/31/23 09:07 EST           NPO status: Patient ate and drank this morning. Medications taken TODAY: Patient states he took all of his morning AM medications. Blood thinners: None.      Personal hx of blood clots: Acute DVT of brachial vein of RUE, also Superficial venous thrombosis of the cephalic vein in the forearm (dx'd 1-7-23). Denies personal hx of MRSA infection. Personal or family hx of previous complications w/anesthesia: PONV w/patient. 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 Hedy Bedolla, orthopedic, every 3-4 months, gets steroid injection    Lezama esophagus      Bleeding gastric varices 12/29/2022    BPH (benign prostatic hypertrophy)      Cholelithiasis      Cirrhosis ([address]      COVID-19 12/2020     pt reports he had a positive test while at [facility] in 2020, was asymptomatic    COVID-19 vaccine series completed 5/20/2021, 6/22/2021     Moderna 5/20/2021, 6/22/2021    DDD (degenerative disc disease), lumbar      Depression      Esophageal cancer ([address]       INVASIVE ADENOCARCINOMA ARISING IN TUBULAR ADENOMA WITH HIGH GRADE DYSPLASIA, ASSOCIATED WITH FOCAL INTESTINAL METAPLASIA     Esophageal varices ([address]      Fatty liver      GERD (gastroesophageal reflux disease)      GI bleed      Heart murmur      Hep C w/o coma, chronic ([address]      History of alcohol abuse       6-12 beers a day; quit drinking 2019    History of blood transfusion      History of colon polyps 2016    History of tobacco abuse      [place] (hard of hearing)      Hyperlipidemia      Hypertension      Hyponatremia 07/20/2016    Hypotension 12/20/2021    Mastoid disorder, bilateral 12/31/2022     biLateral mastoid effusion    Pericardial effusion      PONV (postoperative nausea and vomiting)      Poor venous access       has port in place.     Port-A-Cath in place       right upper chest    Portal hypertension ([address]      Sciatica Secondary esophageal varices ([facility] 06/07/2022    Shortness of breath      Spinal stenosis      Stomach ulcer       hx of    Thrombocytopenia ([facility] 12/23/2020    Tubular adenoma of colon 2016, 2018    Vitamin D deficiency      Wears glasses              SURGICAL HISTORY        Past Surgical History         Past Surgical History:   Procedure Laterality Date    BUNIONECTOMY         twice on right side    BUNIONECTOMY Left      CARPAL TUNNEL RELEASE Right      COLONOSCOPY         at age 35    COLONOSCOPY   10/05/2016     polyps-pathology tubular adenoma, and abnormal looking mucosa right colon-pathology-tubular adenoma    COLONOSCOPY N/A 03/30/2018     COLONOSCOPY POLYPECTOMY COLD BIOPSY performed by Queenie Lundborg, MD at [place] [address]   03/30/2018     Small polyp in the sigmoid colon and excised with biopsy forceps--tubular adenoma    COLONOSCOPY N/A 04/16/2022     COLONOSCOPY POLYPECTOMY performed by Queenie Lundborg, MD at [facility], DIAGNOSTIC         EGD    ESOPHAGOGASTRODUODENOSCOPY   12/29/2022    ESOPHAGOGASTRODUODENOSCOPY N/A 01/03/2023    IR PORT PLACEMENT EQUAL OR GREATER THAN 5 YEARS   04/19/2021     IR PORT PLACEMENT EQUAL OR GREATER THAN 5 YEARS 4/19/2021 [facility]    IR TIPS INSERTION   12/01/2022     IR TIPS INSERTION 12/1/2022 Sonja Dubose MD [facility]    KNEE SURGERY Left       cyst removed    NASAL SEPTUM SURGERY        NERVE BLOCK Right 11/23/2020     NERVE BLOCK RIGHT CERVICAL STEROID INJECTION  C3-C6 performed by Yvon Stafford MD at [address]   01/04/2016     steroid injection C7 T1    OTHER SURGICAL HISTORY   11/21/2016     Bilateral Lumbar CACHORRO L4-L5 injections    OTHER SURGICAL HISTORY   12/19/2016     lumbar steroid injection    OTHER SURGICAL HISTORY   09/28/2018     BILATERAL L5 CACHORRO (N/A Back)    OTHER SURGICAL HISTORY Right 11/23/2020     cervical injection    PAIN MANAGEMENT PROCEDURE Left 07/09/2020     EPIDURAL STEROID INJECTION LEFT L4 L5 performed by Markus Howe MD at [facility] Left 07/20/2020     LEFT L4 L5 EPIDURAL STEROID INJECTION performed by Markus Howe MD at [facility] Bilateral 08/17/2020     LUMBAR FACET BILATERAL L2-L5 performed by Markus Howe MD at [facility] Bilateral 12/07/2020     NERVE BLOCK BILATERAL LUMBAR MEDIAL BRANCH L2-L5 performed by Markus Howe MD at [address] NEUROPLASTY &/TRANSPOS MEDIAN NRV CARPAL Lethaniel Overton Right 08/29/2017     CARPAL TUNNEL RELEASE RIGHT performed by Francisca Vaca MD at [address] &/TRANSPOS MEDIAN NRV CARPAL TUNNE Left 10/31/2017     CARPAL TUNNEL RELEASE performed by Francisca Vaca MD at [place] [address] AA&/STRD TFRML EPI LUMBAR/SACRAL 1 LEVEL Bilateral 09/06/2018     BILATERAL L5 CACHORRO performed by Markus Howe MD at [place] [address] AA&/STRD TFRML EPI LUMBAR/SACRAL 1 LEVEL N/A 09/28/2018     BILATERAL L5 CACHRORO performed by Markus Howe MD at [address] [place] [address] N/A 12/29/2020     EGD BIOPSY performed by Mary Trujillo MD at [address] [place] [address] 02/02/2021     EGD BIOPSY and spot marking performed by Madison Dykes MD at [address] [place] [address] N/A 02/12/2021     ENDOSCOPIC ULTRASOUND, EGD performed by Victor Manuel Kearns MD at [place] [address]   02/12/2021     EGD DIAGNOSTIC ONLY performed by Victor Manuel Kearns MD at [facility] 08/31/2021     EGD BIOPSY performed by Madison Dykes MD at [address] [place] [address] 01/21/2022     EGD BIOPSY performed by Madison Dykes MD at [address] [place] [address] 04/15/2022     EGD ESOPHAGOGASTRODUODENOSCOPY performed by Mary Trujillo MD at [facility] OR    UPPER GASTROINTESTINAL ENDOSCOPY N/A 2022     EGD BAND LIGATION performed by Emy Jimenez MD at [address] [place] [address] 2022     EGD ESOPHAGOGASTRODUODENOSCOPY performed by Emy Jimenez MD at [address] [place] [address] 2022     EGD ESOPHAGOGASTRODUODENOSCOPY ENDOSCOPIC APC AT GE JUNCTION performed by Sheri Pérez MD at [address] [place] [address] 10/19/2022     EGD BIOPSY performed by Radha Hernandez MD at [address] [place] [address] N/A 10/31/2022     EGD with APC performed by Radha Hernandez MD at [address] [place] [address]   2022     EGD ESOPHAGOGASTRODUODENOSCOPY performed by Grace Jimenez MD at [address] [place] [address] 2023     EGD ESOPHAGOGASTRODUODENOSCOPY performed by Deven Richey MD at [address] [place] [address] 2023     EGD CONTROL HEMORRHAGE performed by Radha Hernandez MD at [address] [place] [address] History   Social History            Socioeconomic History    Marital status: Single       Spouse name: None    Number of children: None    Years of education: None    Highest education level: None   Tobacco Use    Smoking status: Former       Packs/day: 1.00       Years: 45.00       Pack years: 45.00       Types: Cigarettes       Quit date: 2017       Years since quittin.0    Smokeless tobacco: Never   Vaping Use    Vaping Use: Never used   Substance and Sexual Activity    Alcohol use: Not Currently       Comment: Quit alcohol in 2019- heavier drinking prior to quitting    Drug use: Not Currently       Frequency: 1.0 times per week       Types: Cocaine       Comment: Cocaine- stopped spring 2016    Sexual activity: Yes       Partners: Female   Social History Narrative      in the past, retired      Social Determinants of Health          Financial Resource Strain: Low Risk     Difficulty of Paying Living Expenses: Not hard at all   Food Insecurity: No Food Insecurity    Worried About Running Out of Food in the Last Year: Never true    Ran Out of Food in the Last Year: Never true   Physical Activity: Inactive    Days of Exercise per Week: 0 days    Minutes of Exercise per Session: 0 min            REVIEW OF SYSTEMS    No Known Allergies            Current Outpatient Medications on File Prior to Encounter   Medication Sig Dispense Refill    spironolactone (ALDACTONE) 25 MG tablet Take 1 tablet by mouth daily 30 tablet 3    sucralfate (CARAFATE) 1 GM tablet Take 1 tablet by mouth 4 times daily 120 tablet 3    lactulose (CHRONULAC) 10 GM/15ML solution take 30 milliliters by mouth three times a day 473 mL 1    furosemide (LASIX) 40 MG tablet Take 1 tablet by mouth in the morning and 1 tablet in the evening. 60 tablet 3    pantoprazole (PROTONIX) 40 MG tablet Take 40 mg by mouth daily        FEROSUL 325 (65 Fe) MG tablet take 1 tablet by mouth twice a day 60 tablet 5    atorvastatin (LIPITOR) 20 MG tablet Take 1 tablet by mouth nightly 30 tablet 3      No current facility-administered medications on file prior to encounter. Review of Systems   Constitutional:  Negative for chills and fever. HENT:  Negative for congestion, ear pain, rhinorrhea, sore throat and trouble swallowing. Respiratory:  Negative for cough, shortness of breath and wheezing. Cardiovascular:  Positive for leg swelling (States was improving, recently worsened again- states LLE worse than RLE (chronic issue)). Negative for chest pain and palpitations. Gastrointestinal:  Negative for abdominal pain, anal bleeding, blood in stool, constipation, diarrhea, nausea and vomiting. See HPI. Genitourinary:  Negative for dysuria and frequency. Musculoskeletal:  Positive for arthralgias (Hx of b/l shin pain).    Neurological:  Negative for dizziness and headaches. Hematological:  Bruises/bleeds easily (Bruising easily). GENERAL PHYSICAL EXAM      Vitals: BP (!) 145/69   Pulse 82   Temp 97.1 °F (36.2 °C) (Infrared)   Resp 16   Ht 5' 10\" (1.778 m)   Wt 210 lb (95.3 kg)   SpO2 100%   BMI 30.13 kg/m²       GENERAL APPEARANCE:   Sandra Urbina is 60 y.o.,  male, mildly obese, nourished, conscious, alert. Does not appear to be in any distress or pain at this time. Physical Exam  Vitals reviewed. Constitutional:       General: He is not in acute distress. Appearance: He is well-developed. He is not ill-appearing, toxic-appearing or diaphoretic. HENT:      Head: Normocephalic. Right Ear: External ear normal.      Left Ear: External ear normal.      Nose: Nose normal.      Mouth/Throat:      Dentition: Abnormal dentition (+ missing teeth, decay). Pharynx: No oropharyngeal exudate or posterior oropharyngeal erythema. Tonsils: No tonsillar abscesses. Eyes:      General: No scleral icterus. Right eye: No discharge. Left eye: No discharge. Conjunctiva/sclera: Conjunctivae normal.      Pupils: Pupils are equal, round, and reactive to light. Comments: + glasses. Cardiovascular:      Rate and Rhythm: Normal rate and regular rhythm. Pulses: Intact distal pulses. Radial pulses are 2+ on the right side and 2+ on the left side. Heart sounds: Murmur heard. Pulmonary:      Effort: No accessory muscle usage or respiratory distress. Breath sounds: Normal breath sounds. No decreased breath sounds, wheezing, rhonchi or rales. Comments: Patient does appear slightly SOB upon presentation to H&P room, improved w/rest.   Abdominal:      General: Abdomen is protuberant. Bowel sounds are normal. There is distension. Palpations: Abdomen is rigid. Tenderness: There is no abdominal tenderness. There is no guarding or rebound.       Hernia: A hernia (Non-tender) is present. Hernia is present in the umbilical area. Musculoskeletal:      Right forearm: No swelling. Left forearm: Swelling present. Left wrist: Swelling present. Right hand: Normal strength. Left hand: Swelling present. Normal strength. Normal sensation. Normal capillary refill. Normal pulse. Right lower leg: No tenderness (Denies shin pain on assessment). 2+ Pitting Edema present. Left lower leg: No tenderness (Denies shin pain on assessment). 2+ Pitting Edema (LLE slightly worse than RLE (patient states chronic), no erythema/no warmth) present. Comments: Negative Sebastien's sign b/l (performed in sitting position). No erythema, warmth noted to examined LUE. Lymphadenopathy:      Cervical: No cervical adenopathy. Skin:     General: Skin is warm and dry. Coloration: Skin is not jaundiced. Comments: Subcutaneous port to right upper chest.   Neurological:      Mental Status: He is alert and oriented to person, place, and time.    Psychiatric:         Behavior: Behavior normal.         RECENT LAB WORK            Lab Results   Component Value Date      (L) 01/27/2023     K 3.9 01/27/2023     CL 99 01/27/2023     CO2 26 01/27/2023     BUN 10 01/27/2023     CREATININE 0.67 (L) 01/27/2023     GLUCOSE 137 (H) 01/27/2023     CALCIUM 8.0 (L) 01/27/2023     PROT 5.0 (L) 01/24/2023     LABALBU 2.4 (L) 01/24/2023     BILITOT 1.5 (H) 01/24/2023     ALKPHOS 197 (H) 01/24/2023     AST 69 (H) 01/24/2023     ALT 25 01/24/2023     LABGLOM >60 01/27/2023     GFRAA >60 09/28/2022     GLOB NOT REPORTED 08/19/2021            Lab Results   Component Value Date     WBC 5.9 01/27/2023     HGB 8.3 (L) 01/30/2023     HCT 27.3 (L) 01/30/2023     MCV 84.3 01/27/2023     PLT 99 (L) 01/27/2023            Lab Results   Component Value Date     INR 1.4 01/24/2023     INR 1.6 01/03/2023     INR 1.4 01/02/2023     PROTIME 17.0 (H) 01/24/2023     PROTIME 16.0 (H) 01/03/2023 PROTIME 14.7 (H) 01/02/2023            Lab Results   Component Value Date     APTT 35.9 (H) 01/02/2023            Lab Results   Component Value Date/Time     MG 1.7 01/24/2023 10:00 PM      PROVISIONAL DIAGNOSES / PROCEDURE:       Alcoholic cirrhosis of liver with ascites      IR US GUIDED PARACENTESIS           Patient Active Problem List     Diagnosis Date Noted    Anasarca 01/09/2023    Chronic hepatitis C without hepatic coma ([address] 01/08/2023    Swelling of joint of upper arm, right 01/08/2023    Acute deep vein thrombosis (DVT) of brachial vein of right upper extremity ([address] 01/07/2023    Lezama's esophagus with dysplasia 12/30/2022    S/P TIPS (transjugular intrahepatic portosystemic shunt) 12/01/2022    Goals of care, counseling/discussion 10/31/2022    ACP (advance care planning) 10/31/2022    Palliative care encounter 10/31/2022    GI bleed 09/13/2022    Esophageal polyp 06/07/2022    Drop in hemoglobin 06/03/2022    Shortness of breath      Ascites due to alcoholic cirrhosis ([address] 32/54/8098    Acute kidney failure, unspecified ([address] 04/21/2022    Muscle weakness (generalized) 07/28/2016    Other abnormalities of gait and mobility 07/28/2016    Mastoid disorder, bilateral 12/31/2022    Anemia 04/13/2022    Acute kidney injury ([address] 04/13/2022    Esophageal adenocarcinoma (HCC)      Low hemoglobin 12/20/2021    Symptomatic anemia, microcytic, acute 12/20/2021    Hypotension 12/20/2021    Former smoker, 50+ pack years, quit 2016 12/20/2021    HLD (hyperlipidemia) 12/20/2021    Abnormal findings on diagnostic imaging of spine 12/14/2021    Cervical spinal stenosis 12/14/2021    Spinal stenosis of lumbar region with neurogenic claudication 12/14/2021    Severe comorbid illness 11/30/2021    Gait instability 11/30/2021    Current smoker 04/05/2021    COVID-19 02/23/2021    Anxiety 02/23/2021    Malignant neoplasm of lower third of esophagus ([address]      Hypocalcemia 12/26/2020    Hypophosphatemia 12/26/2020 Alcohol abuse      Altered mental status      Thrombocytopenia ([address] 12/23/2020    Hepatitis C virus infection resolved after antiviral drug therapy 12/23/2020    Cervical facet syndrome 11/23/2020    Lumbar facet arthropathy 08/17/2020    Elevated LFTs 08/12/2020    Seasonal allergies 08/12/2020    S/P epidural steroid injection 08/05/2020    Essential hypertension 04/24/2019    Recurrent major depressive disorder in partial remission ([address] 04/24/2019    Pure hypercholesterolemia 02/04/2019    Acute hypokalemia 02/04/2019    Vitamin D deficiency 09/20/2017    History of hepatitis C 09/11/2017    Ganglion cyst 05/31/2017    Carpal tunnel syndrome of right wrist 05/31/2017    Tinnitus 03/23/2017    Eustachian tube dysfunction 03/23/2017    Lumbar radiculitis 11/08/2016    Lumbar disc herniation 11/08/2016    Gynecomastia, male 10/26/2016    Depression 10/13/2016    Vertebrogenic low back pain 10/06/2016    DDD (degenerative disc disease), lumbar 10/06/2016    Decompensation of cirrhosis of liver ([address] 09/15/2016    Psychophysiologic insomnia 09/14/2016    Cirrhosis ([address]      Hep C w/o coma, chronic ([address]      Fatty liver      Calculus of gallbladder without cholecystitis 08/10/2016    Chronic viral hepatitis B without delta agent and without coma ([address] 07/22/2016    Hypomagnesemia      Cervical radicular pain 01/04/2016    Tubular adenoma of colon 01/01/2016    History of colon polyps 01/01/2016    Back pain, chronic 04/19/2012    Hearing difficulty 04/19/2012    GERD (gastroesophageal reflux disease) 04/19/2012            MASSIMO Bowman - CNP on 2/3/2023 at 12:46 PM

## 2023-02-10 NOTE — PROGRESS NOTES
Patient brought to the IR suite and U/S used to located fluid pocket. No fluid was found and patient opted to defer paracentesis at this time. Will check with Dr. Manoj Sun to change order to every 2 weeks.

## 2023-02-13 ENCOUNTER — ANESTHESIA (OUTPATIENT)
Dept: ENDOSCOPY | Age: 63
End: 2023-02-13
Payer: MEDICARE

## 2023-02-13 ENCOUNTER — TELEPHONE (OUTPATIENT)
Dept: GASTROENTEROLOGY | Age: 63
End: 2023-02-13

## 2023-02-13 ENCOUNTER — HOSPITAL ENCOUNTER (OUTPATIENT)
Age: 63
Setting detail: OUTPATIENT SURGERY
Discharge: HOME | End: 2023-02-13
Attending: INTERNAL MEDICINE | Admitting: INTERNAL MEDICINE
Payer: MEDICARE

## 2023-02-13 VITALS
RESPIRATION RATE: 12 BRPM | OXYGEN SATURATION: 99 % | SYSTOLIC BLOOD PRESSURE: 119 MMHG | TEMPERATURE: 98.2 F | HEART RATE: 88 BPM | HEIGHT: 70 IN | WEIGHT: 202 LBS | DIASTOLIC BLOOD PRESSURE: 71 MMHG | BODY MASS INDEX: 28.92 KG/M2

## 2023-02-13 PROCEDURE — 2720000010 HC SURG SUPPLY STERILE: Performed by: INTERNAL MEDICINE

## 2023-02-13 PROCEDURE — 3609012400 HC EGD TRANSORAL BIOPSY SINGLE/MULTIPLE: Performed by: INTERNAL MEDICINE

## 2023-02-13 PROCEDURE — 7100000010 HC PHASE II RECOVERY - FIRST 15 MIN: Performed by: INTERNAL MEDICINE

## 2023-02-13 PROCEDURE — 6360000002 HC RX W HCPCS: Performed by: NURSE ANESTHETIST, CERTIFIED REGISTERED

## 2023-02-13 PROCEDURE — 3700000001 HC ADD 15 MINUTES (ANESTHESIA): Performed by: INTERNAL MEDICINE

## 2023-02-13 PROCEDURE — 2709999900 HC NON-CHARGEABLE SUPPLY: Performed by: INTERNAL MEDICINE

## 2023-02-13 PROCEDURE — 2500000003 HC RX 250 WO HCPCS: Performed by: NURSE ANESTHETIST, CERTIFIED REGISTERED

## 2023-02-13 PROCEDURE — 6360000002 HC RX W HCPCS: Performed by: ANESTHESIOLOGY

## 2023-02-13 PROCEDURE — 2580000003 HC RX 258: Performed by: ANESTHESIOLOGY

## 2023-02-13 PROCEDURE — 3700000000 HC ANESTHESIA ATTENDED CARE: Performed by: INTERNAL MEDICINE

## 2023-02-13 PROCEDURE — 7100000011 HC PHASE II RECOVERY - ADDTL 15 MIN: Performed by: INTERNAL MEDICINE

## 2023-02-13 PROCEDURE — 43255 EGD CONTROL BLEEDING ANY: CPT | Performed by: INTERNAL MEDICINE

## 2023-02-13 PROCEDURE — 6370000000 HC RX 637 (ALT 250 FOR IP): Performed by: ANESTHESIOLOGY

## 2023-02-13 RX ORDER — FENTANYL CITRATE 0.05 MG/ML
25 INJECTION, SOLUTION INTRAMUSCULAR; INTRAVENOUS EVERY 5 MIN PRN
Status: DISCONTINUED | OUTPATIENT
Start: 2023-02-13 | End: 2023-02-13 | Stop reason: HOSPADM

## 2023-02-13 RX ORDER — SODIUM CHLORIDE 9 MG/ML
INJECTION, SOLUTION INTRAVENOUS PRN
Status: DISCONTINUED | OUTPATIENT
Start: 2023-02-13 | End: 2023-02-13 | Stop reason: HOSPADM

## 2023-02-13 RX ORDER — METOCLOPRAMIDE HYDROCHLORIDE 5 MG/ML
10 INJECTION INTRAMUSCULAR; INTRAVENOUS
Status: DISCONTINUED | OUTPATIENT
Start: 2023-02-13 | End: 2023-02-13 | Stop reason: HOSPADM

## 2023-02-13 RX ORDER — ACETAMINOPHEN 325 MG/1
650 TABLET ORAL ONCE
Status: COMPLETED | OUTPATIENT
Start: 2023-02-13 | End: 2023-02-13

## 2023-02-13 RX ORDER — MIDAZOLAM HYDROCHLORIDE 1 MG/ML
INJECTION INTRAMUSCULAR; INTRAVENOUS PRN
Status: DISCONTINUED | OUTPATIENT
Start: 2023-02-13 | End: 2023-02-13 | Stop reason: SDUPTHER

## 2023-02-13 RX ORDER — SODIUM CHLORIDE 9 MG/ML
INJECTION, SOLUTION INTRAVENOUS CONTINUOUS
Status: DISCONTINUED | OUTPATIENT
Start: 2023-02-13 | End: 2023-02-13 | Stop reason: HOSPADM

## 2023-02-13 RX ORDER — ONDANSETRON 2 MG/ML
4 INJECTION INTRAMUSCULAR; INTRAVENOUS
Status: COMPLETED | OUTPATIENT
Start: 2023-02-13 | End: 2023-02-13

## 2023-02-13 RX ORDER — SODIUM CHLORIDE 0.9 % (FLUSH) 0.9 %
5-40 SYRINGE (ML) INJECTION EVERY 12 HOURS SCHEDULED
Status: DISCONTINUED | OUTPATIENT
Start: 2023-02-13 | End: 2023-02-13 | Stop reason: HOSPADM

## 2023-02-13 RX ORDER — SODIUM CHLORIDE 0.9 % (FLUSH) 0.9 %
5-40 SYRINGE (ML) INJECTION PRN
Status: DISCONTINUED | OUTPATIENT
Start: 2023-02-13 | End: 2023-02-13 | Stop reason: HOSPADM

## 2023-02-13 RX ORDER — DIPHENHYDRAMINE HYDROCHLORIDE 50 MG/ML
12.5 INJECTION INTRAMUSCULAR; INTRAVENOUS
Status: DISCONTINUED | OUTPATIENT
Start: 2023-02-13 | End: 2023-02-13 | Stop reason: HOSPADM

## 2023-02-13 RX ORDER — LIDOCAINE HYDROCHLORIDE 20 MG/ML
INJECTION, SOLUTION EPIDURAL; INFILTRATION; INTRACAUDAL; PERINEURAL PRN
Status: DISCONTINUED | OUTPATIENT
Start: 2023-02-13 | End: 2023-02-13 | Stop reason: SDUPTHER

## 2023-02-13 RX ORDER — HEPARIN SODIUM (PORCINE) LOCK FLUSH IV SOLN 100 UNIT/ML 100 UNIT/ML
500 SOLUTION INTRAVENOUS PRN
Status: COMPLETED | OUTPATIENT
Start: 2023-02-13 | End: 2023-02-13

## 2023-02-13 RX ORDER — LIDOCAINE HYDROCHLORIDE 10 MG/ML
1 INJECTION, SOLUTION EPIDURAL; INFILTRATION; INTRACAUDAL; PERINEURAL
Status: DISCONTINUED | OUTPATIENT
Start: 2023-02-13 | End: 2023-02-13 | Stop reason: HOSPADM

## 2023-02-13 RX ORDER — PROPOFOL 10 MG/ML
INJECTION, EMULSION INTRAVENOUS PRN
Status: DISCONTINUED | OUTPATIENT
Start: 2023-02-13 | End: 2023-02-13 | Stop reason: SDUPTHER

## 2023-02-13 RX ADMIN — ONDANSETRON 4 MG: 2 INJECTION INTRAMUSCULAR; INTRAVENOUS at 10:40

## 2023-02-13 RX ADMIN — ACETAMINOPHEN 650 MG: 325 TABLET, FILM COATED ORAL at 11:01

## 2023-02-13 RX ADMIN — PROPOFOL 370 MG: 10 INJECTION, EMULSION INTRAVENOUS at 09:21

## 2023-02-13 RX ADMIN — MIDAZOLAM 2 MG: 1 INJECTION INTRAMUSCULAR; INTRAVENOUS at 09:17

## 2023-02-13 RX ADMIN — LIDOCAINE HYDROCHLORIDE 80 MG: 20 INJECTION, SOLUTION EPIDURAL; INFILTRATION; INTRACAUDAL; PERINEURAL at 09:21

## 2023-02-13 RX ADMIN — SODIUM CHLORIDE: 9 INJECTION, SOLUTION INTRAVENOUS at 08:38

## 2023-02-13 RX ADMIN — Medication 500 UNITS: at 11:16

## 2023-02-13 ASSESSMENT — PAIN SCALES - GENERAL
PAINLEVEL_OUTOF10: 1
PAINLEVEL_OUTOF10: 4

## 2023-02-13 ASSESSMENT — PAIN - FUNCTIONAL ASSESSMENT: PAIN_FUNCTIONAL_ASSESSMENT: 0-10

## 2023-02-13 ASSESSMENT — ENCOUNTER SYMPTOMS: SHORTNESS OF BREATH: 1

## 2023-02-13 ASSESSMENT — PAIN DESCRIPTION - LOCATION
LOCATION: HEAD
LOCATION: HEAD

## 2023-02-13 ASSESSMENT — PAIN DESCRIPTION - DESCRIPTORS
DESCRIPTORS: ACHING
DESCRIPTORS: ACHING

## 2023-02-13 NOTE — DISCHARGE INSTRUCTIONS
To see in the office in the next 2 to 3 weeks                  Sedation or General Anesthesia, Adult  Care After  Refer to this sheet in the next 24 hours. These instructions provide you with information on caring for yourself after your procedure. Your caregiver may also give you more specific instructions. Your treatment has been planned according to current medical practices, but problems sometimes occur. Call your caregiver if you have any problems or questions after your procedure. HOME CARE INSTRUCTIONS   Do not participate in any activities that require you to be alert or coordinated. Do not:  Drive. Swim. Ride a bicycle. Operate heavy machinery. [employer]. Use power tools. Climb ladders. Work at [place]. Take a bath. Do not drink alcohol. Do not make any important decisions or sign legal documents. Stay with an adult. The first meal following your procedure should be light and small. Avoid solid foods if you feel sick to your stomach (nauseous) or if you throw up (vomit). Drink enough fluids to keep your urine clear or pale yellow. Only take your usual medicines or new medicines if your caregiver approves them. Only take over-the-counter or prescription medicines for pain, discomfort, or fever as directed by your caregiver. Keep all follow-up appointments as directed by your caregiver. SEEK IMMEDIATE MEDICAL CARE IF:   You are not feeling normal or behaving normally after 24 hours. You have persistent nausea and vomiting. You are unable to drink fluids or eat food. You have difficulty urinating. You have difficulty breathing or speaking. You have blue or gray skin. There is difficulty waking or you cannot be woken up. You have heavy bleeding, redness, or a lot of swelling where the sedative or anesthesia entered your skin (intravenous site). You have a rash. MAKE SURE YOU:  Understand these instructions. Will watch your condition.   Will get help right away if you are not doing well or get worse. Document Released: 12/18/2006 Document Revised: 06/18/2013 Document Reviewed: 04/17/2013  FABIEN TAYLOREmily [facility] Patient Information ©2013 Jennifer. EGD DISCHARGE INSTRUCTIONS    Activity:  Rest today. No driving, operating machinery, or making any important decisions today. May resume normal activity tomorrow. Diet:  Following EGD eat slowly, chew food well, cut food into small pieces-Avoid greasy, spicy, \"crunchy\" foods X 48 hours. Call your Doctor if you have any of the following:  -Passing blood rectally or vomiting blood (it may be red or black).   -Persistent nausea/vomiting  -Severe abdominal or chest pain not relieved with passing gas  -Fever of 100 degrees or more  -Redness or swelling at the IV site  -Severe sore throat or neck pain

## 2023-02-13 NOTE — PROGRESS NOTES
Port accessed per MADDISON Coley RN per protocol, 10 cc blood drawn.  Flushed 10cc saline, iv fluids infusing per gravity

## 2023-02-13 NOTE — H&P
HISTORY and [id number]       NAME:  Mina Tamez  MRN: [id number]   YOB: 1960   Date: 2/13/2023   Age: 60 y.o. Gender: male       COMPLAINT AND PRESENT HISTORY:                  Mina Tamez is 60 y.o.,  male, undergoing for EGD BIOPSY. ESOPHAGOGASTRODUODENOSCOPY. Pt is being seen for hx of:  Portal hypertension     Office note of Dr. Elpidio Jean Baptiste on 2/1/2023  HISTORY OF PRESENT ILLNESS:   Last week patient was at [facility] with anemia. EGD revealed diffuse oozing of blood from lower esophagus and hiatal hernia felt to be secondary to angioectasia. Patient had radiation therapy of lower esophagus and lateral hernia area that may be the etiology for this angioectasia. Patient had APC cauterization of the angioectasia. Patient did well and was discharged. Following discharge he is doing reasonably well. Has light brown color stools. No nausea vomiting. He is known to have alcoholic liver disease, decompensated. 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 At present no signs of visible tumor seen in the esophagus. Patient is having significant ascites needing paracentesis on a weekly basis. He also had mild renal insufficiency and cannot diurese him aggressively. At present he is on spironolactone 25 mg a day and furosemide 40 mg a day. Past Medical,Family, and Social History reviewed and does contribute to the patient presenting condition. BP Readings from Last 3 Encounters:   02/10/23 138/71   02/03/23 115/65   02/01/23 117/62     Patient has had numerous  endoscopies, his  done last January 2023 to control hemorrhage. Patient denies any heartburn, indigestion, acid reflux (GERD) . Pt is on Carafate and Protonix    No dysphagia or regurgitation. No epigastric pain, nausea or  vomiting. No changes in appetite. Wt loss. No Diarrhea or constipation.  No blood in stools, or tarry stools. No FH of esophageal cancer. Hx of any significant  medical hx : DVT, cirrhosis, anemia, esophageal cancer, esophageal varices, heart murmur, HTN, hpn, HLD, HEP C, thrombocytopenia. SOB. Blood thinner/anticoagulant: no     Patient voices feeling well today. Denies any recent fever or chills, chest pain . Pt reports SOB. Functional Capacity per patient:              1. Patient is not able to walk 2 city blocks on level ground without SOB. 2. Patient is not able to climb 2 flights of stairs without SOB. Hx of smoking : no    Pt has hx of PONV, denies any issues with Anesthesia. Patient has been NPO since midnight. No blood thinners in the past 5-7 days.        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 Farrukh Ortega, orthopedic, every 3-4 months, gets steroid injection    Lezama esophagus     Bleeding gastric varices 12/29/2022    BPH (benign prostatic hypertrophy)     Cholelithiasis     Cirrhosis ([address]     COVID-19 12/2020    pt reports he had a positive test while at [facility] in 2020, was asymptomatic    COVID-19 vaccine series completed 5/20/2021, 6/22/2021    Moderna 5/20/2021, 6/22/2021    DDD (degenerative disc disease), lumbar     Depression     Esophageal cancer ([address]     INVASIVE ADENOCARCINOMA ARISING IN TUBULAR ADENOMA WITH HIGH GRADE DYSPLASIA, ASSOCIATED WITH FOCAL INTESTINAL METAPLASIA     Esophageal varices ([address]     Fatty liver     GERD (gastroesophageal reflux disease)     GI bleed     Heart murmur     Hep C w/o coma, chronic ([address]     History of alcohol abuse     6-12 beers a day; quit drinking 2019    History of blood transfusion     History of colon polyps 2016    History of tobacco abuse     [place] (hard of hearing)     Hyperlipidemia     Hypertension     Hyponatremia 07/20/2016    Hypotension 12/20/2021    Mastoid disorder, bilateral 12/31/2022    biLateral mastoid effusion    Pericardial effusion     PONV (postoperative nausea and vomiting)     Poor venous access     has port in place.     Port-A-Cath in place     right upper chest    Portal hypertension ([address]     Sciatica     Secondary esophageal varices ([address] 06/07/2022    Shortness of breath     Spinal stenosis     Stomach ulcer     hx of    Thrombocytopenia ([address] 12/23/2020    Tubular adenoma of colon 2016, 2018    Vitamin D deficiency     Wears glasses        SURGICAL HISTORY       Past Surgical History:   Procedure Laterality Date    BUNIONECTOMY      twice on right side    BUNIONECTOMY Left     CARPAL TUNNEL RELEASE Right     COLONOSCOPY      at age 35    COLONOSCOPY  10/05/2016    polyps-pathology tubular adenoma, and abnormal looking mucosa right colon-pathology-tubular adenoma    COLONOSCOPY N/A 03/30/2018    COLONOSCOPY POLYPECTOMY COLD BIOPSY performed by Jovan Quinn MD at [address] [address]  03/30/2018    Small polyp in the sigmoid colon and excised with biopsy forceps--tubular adenoma    COLONOSCOPY N/A 04/16/2022    COLONOSCOPY POLYPECTOMY performed by Jovan Quinn MD at [address], DIAGNOSTIC      EGD    ESOPHAGOGASTRODUODENOSCOPY  12/29/2022    ESOPHAGOGASTRODUODENOSCOPY N/A 01/03/2023    IR PORT PLACEMENT EQUAL OR GREATER THAN 5 YEARS  04/19/2021    IR PORT PLACEMENT EQUAL OR GREATER THAN 5 YEARS 4/19/2021 [facility]    IR TIPS INSERTION  12/01/2022    IR TIPS INSERTION 12/1/2022 Antoinette Maier MD [facility]    KNEE SURGERY Left     cyst removed    NASAL SEPTUM SURGERY      NERVE BLOCK Right 11/23/2020    NERVE BLOCK RIGHT CERVICAL STEROID INJECTION C3-C6 performed by Angie Nation MD at [facility]  01/04/2016    steroid injection C7 T1    OTHER SURGICAL HISTORY  11/21/2016    Bilateral Lumbar CACHORRO L4-L5 injections    OTHER SURGICAL HISTORY  12/19/2016    lumbar steroid injection    OTHER SURGICAL HISTORY  09/28/2018    BILATERAL L5 CACHORRO (N/A Back)    OTHER SURGICAL HISTORY Right 11/23/2020    cervical injection    PAIN MANAGEMENT PROCEDURE Left 07/09/2020    EPIDURAL STEROID INJECTION LEFT L4 L5 performed by Angie Nation MD at [facility] Left 07/20/2020    LEFT L4 L5 EPIDURAL STEROID INJECTION performed by Angie Nation MD at [facility] Bilateral 08/17/2020    LUMBAR FACET BILATERAL L2-L5 performed by Angie Nation MD at [facility] Bilateral 12/07/2020    NERVE BLOCK BILATERAL LUMBAR MEDIAL BRANCH L2-L5 performed by Angie Nation MD at [address] [employer] NEUROPLASTY &/TRANSPOS MEDIAN NRV CARPAL TUNNE Right 08/29/2017    CARPAL TUNNEL RELEASE RIGHT performed by Diego Christine MD at [address] [employer] &/TRANSPOS MEDIAN NRV CARPAL TUNNE Left 10/31/2017    CARPAL TUNNEL RELEASE performed by Diego Christine MD at [facility] [address] AA&/STRD TFRML EPI LUMBAR/SACRAL 1 LEVEL Bilateral 09/06/2018    BILATERAL L5 CACHORRO performed by Angie Nation MD at [facility] [address] AA&/STRD TFRML EPI LUMBAR/SACRAL 1 LEVEL N/A 09/28/2018    BILATERAL L5 CACHORRO performed by Angie Nation MD at [address] [place] [address] 12/29/2020    EGD BIOPSY performed by Macario Faulkner MD at [address] [place] [address] 02/02/2021    EGD BIOPSY and spot marking performed by Jannet Savage MD at [address] [place] [address] N/A 02/12/2021    ENDOSCOPIC ULTRASOUND, EGD performed by Victor Manuel Kimble MD at [place] [address]  02/12/2021    EGD DIAGNOSTIC ONLY performed by Antonio Bolton MD at [facility]    UPPER GASTROINTESTINAL ENDOSCOPY N/A 2021    EGD BIOPSY performed by Min Smith MD at [address] [place] [address] 2022    EGD BIOPSY performed by Min Smith MD at [address] [place] [address] N/A 04/15/2022    EGD ESOPHAGOGASTRODUODENOSCOPY performed by Andres Villeda MD at [address] [place] [address] N/A 2022    EGD BAND LIGATION performed by Mildred Clay MD at [address] [place] [address] N/A 2022    EGD ESOPHAGOGASTRODUODENOSCOPY performed by Mildred Clay MD at [address] [place] [address] N/A 2022    EGD ESOPHAGOGASTRODUODENOSCOPY ENDOSCOPIC APC AT GE JUNCTION performed by Danielle Schwartz MD at [address] [place] [address] N/A 10/19/2022    EGD BIOPSY performed by Min Smith MD at [address] [place] [address] 10/31/2022    EGD with APC performed by Min Smith MD at [address] [place] [address]  2022    EGD ESOPHAGOGASTRODUODENOSCOPY performed by Antonio Bolton MD at [address] [place] [address] 2023    EGD ESOPHAGOGASTRODUODENOSCOPY performed by Huyen Miller MD at [address] [place] [address] N/A 2023    EGD CONTROL HEMORRHAGE performed by Min Smith MD at [address] [facility] [facility], [facility]       Family History   Problem Relation Age of Onset    Cancer Mother         pancreatic    Cancer Father         bone    Diabetes Sister     Asthma Brother     Allergies Brother         MULTIPLE       SOCIAL HISTORY       Social History     Socioeconomic History    Marital status: Single   Tobacco Use    Smoking status: Former     Packs/day: 1.00     Years: 45.00     Pack years: 45.00     Types: Cigarettes     Quit date: 2017     Years since quittin.0 Smokeless tobacco: Never   Vaping Use    Vaping Use: Never used   Substance and Sexual Activity    Alcohol use: Not Currently     Comment: Quit alcohol in 2019- heavier drinking prior to quitting    Drug use: Not Currently     Frequency: 1.0 times per week     Types: Cocaine     Comment: Cocaine- stopped spring 2016    Sexual activity: Yes     Partners: Female   Social History Narrative     in the past, retired     Social Determinants of Health     Financial Resource Strain: Low Risk     Difficulty of Paying Living Expenses: Not hard at all   Food Insecurity: No Food Insecurity    Worried About [address] in the Last Year: Never true    [facility] in the Last Year: Never true   Physical Activity: Inactive    Days of Exercise per Week: 0 days    Minutes of Exercise per Session: 0 min           REVIEW OF SYSTEMS      No Known Allergies    No current facility-administered medications on file prior to encounter. Current Outpatient Medications on File Prior to Encounter   Medication Sig Dispense Refill    sucralfate (CARAFATE) 1 GM tablet Take 1 tablet by mouth 4 times daily 120 tablet 3    lactulose (CHRONULAC) 10 GM/15ML solution take 30 milliliters by mouth three times a day 473 mL 1    furosemide (LASIX) 40 MG tablet Take 1 tablet by mouth in the morning and 1 tablet in the evening. 60 tablet 3    pantoprazole (PROTONIX) 40 MG tablet Take 40 mg by mouth daily      FEROSUL 325 (65 Fe) MG tablet take 1 tablet by mouth twice a day 60 tablet 5    atorvastatin (LIPITOR) 20 MG tablet Take 1 tablet by mouth nightly 30 tablet 3       Negative except for what is mentioned in the HPI. GENERAL PHYSICAL EXAM     Vitals :   See vital signs in RN flow sheet. GENERAL APPEARANCE:   Sussy Zamora is 60 y.o., male, moderately obese, nourished, conscious, alert. Does not appear to be distress or pain at this time. SKIN:  Warm, dry, no cyanosis or jaundice. HEAD:  Normocephalic, atraumatic, no swelling or tenderness. EYES:  Pupils equal, reactive to light. EARS:  No discharge, no marked hearing loss. NOSE:  No rhinorrhea, epistaxis or septal deformity. THROAT:  Not congested. No ulceration bleeding or discharge. NECK:  No stiffness, trachea central.  No palpable masses or L.N.                 CHEST:  Symmetrical and equal on expansion. Mediport to right chest.                HEART:  RRR . No audible murmurs or gallops. LUNGS:  Equal on expansion, normal breath sounds. No adventitious sounds. ABDOMEN:  Obese. Soft on palpation. No dysphagia, No localized tenderness. No guarding or rigidity. LYMPHATICS:  No palpable cervical lymphadenopathy. LOCOMOTOR, BACK AND SPINE:  No tenderness or deformities. EXTREMITIES:  Symmetrical, no pretibial edema. No discoloration or ulcerations. NEUROLOGIC:  The patient is conscious, alert, oriented,Cranial nerve II-XII intact, taste and smell were not examined. No apparent focal sensory or motor deficits.              PROVISIONAL DIAGNOSES / SURGERY:      EGD BIOPSY    Portal hypertension     Patient Active Problem List    Diagnosis Date Noted    Anasarca 01/09/2023    Chronic hepatitis C without hepatic coma ([address] 01/08/2023    Swelling of joint of upper arm, right 01/08/2023    Acute deep vein thrombosis (DVT) of brachial vein of right upper extremity ([address] 01/07/2023    Lezama's esophagus with dysplasia 12/30/2022    S/P TIPS (transjugular intrahepatic portosystemic shunt) 12/01/2022    Goals of care, counseling/discussion 10/31/2022    ACP (advance care planning) 10/31/2022    Palliative care encounter 10/31/2022    GI bleed 09/13/2022    Esophageal polyp 06/07/2022    Drop in hemoglobin 06/03/2022    Shortness of breath     Ascites due to alcoholic cirrhosis ([address] 32/82/3769    Acute kidney failure, unspecified ([address] 04/21/2022    Muscle weakness (generalized) 07/28/2016    Other abnormalities of gait and mobility 07/28/2016    Mastoid disorder, bilateral 12/31/2022    Anemia 04/13/2022    Acute kidney injury ([address] 04/13/2022    Esophageal adenocarcinoma ([address]     Low hemoglobin 12/20/2021    Symptomatic anemia, microcytic, acute 12/20/2021    Hypotension 12/20/2021    Former smoker, 50+ pack years, quit 2016 12/20/2021    HLD (hyperlipidemia) 12/20/2021    Abnormal findings on diagnostic imaging of spine 12/14/2021    Cervical spinal stenosis 12/14/2021    Spinal stenosis of lumbar region with neurogenic claudication 12/14/2021    Severe comorbid illness 11/30/2021    Gait instability 11/30/2021    Current smoker 04/05/2021    COVID-19 02/23/2021    Anxiety 02/23/2021    Malignant neoplasm of lower third of esophagus ([address]     Hypocalcemia 12/26/2020    Hypophosphatemia 12/26/2020    Alcohol abuse     Altered mental status     Thrombocytopenia ([address] 12/23/2020    Hepatitis C virus infection resolved after antiviral drug therapy 12/23/2020    Cervical facet syndrome 11/23/2020    Lumbar facet arthropathy 08/17/2020    Elevated LFTs 08/12/2020    Seasonal allergies 08/12/2020    S/P epidural steroid injection 08/05/2020    Essential hypertension 04/24/2019    Recurrent major depressive disorder in partial remission ([address] 04/24/2019    Pure hypercholesterolemia 02/04/2019    Acute hypokalemia 02/04/2019    Vitamin D deficiency 09/20/2017    History of hepatitis C 09/11/2017    Ganglion cyst 05/31/2017    Carpal tunnel syndrome of right wrist 05/31/2017    Tinnitus 03/23/2017    Eustachian tube dysfunction 03/23/2017    Lumbar radiculitis 11/08/2016    Lumbar disc herniation 11/08/2016    Gynecomastia, male 10/26/2016    Depression 10/13/2016    Vertebrogenic low back pain 10/06/2016    DDD (degenerative disc disease), lumbar 10/06/2016    Decompensation of cirrhosis of liver ([address] 09/15/2016 Psychophysiologic insomnia 09/14/2016    [address]     Hep C w/o coma, chronic ([facility]     Fatty liver     Calculus of gallbladder without cholecystitis 08/10/2016    Chronic viral hepatitis B without delta agent and without coma ([facility] 07/22/2016    Hypomagnesemia     Cervical radicular pain 01/04/2016    Tubular adenoma of colon 01/01/2016    History of colon polyps 01/01/2016    Back pain, chronic 04/19/2012    Hearing difficulty 04/19/2012    GERD (gastroesophageal reflux disease) 04/19/2012           ANITA Acevedo, APRN - CNP on 2/13/2023 at 6:58 AM

## 2023-02-13 NOTE — OP NOTE
ESOPHAGOGASTRODUODENOSCOPY   ( EGD )  DATE OF PROCEDURE: 2/13/2023     SURGEON: Meggan Camarillo MD    ASSISTANT: None    PREOPERATIVE DIAGNOSIS: Frequency drop of hemoglobin, diffuse oozing of blood from lower esophagus and hiatal hernia secondary to telangiectasia like lesions. Procedure for prompt evaluate and cauterize bleeding sites. Noted to have stage II carcinoma of the esophagus and patient received radiation therapy in the past  POSTOPERATIVE DIAGNOSIS: APC cauterization of telangiectasia like lesions in the lower esophagus/hiatal hernia    OPERATION: Upper GI endoscopy with Biopsy    ANESTHESIA: MAC    ESTIMATED BLOOD LOSS: None    COMPLICATIONS: None. SPECIMENS:  Was Not Obtained    HISTORY: The patient is a 60y.o. year old male with history of above preop diagnosis. I recommended esophagogastroduodenoscopy with possible biopsy and I explained the risk, benefits, expected outcome, and alternatives to the procedure. Risks included but are not limited to bleeding, infection, respiratory distress, hypotension, and perforation of the esophagus, stomach, or duodenum. Patient understands and is in agreement. PROCEDURE: The patient was given IV conscious sedation. The patient's SPO2 remained above 90% throughout the procedure. Cetacaine spray given. Patient placed in left lateral position. Olympus  videogastroscope was inserted orally under vision into the esophagus without difficulty and advanced into the stomach then through the pylorus up to the second part of duodenum. Findings:    Retropharyngeal area was grossly normal appearing    Esophagus: abnormal: In the lower esophagus patient has been using a blood from telangiectasia like lesions. These are cauterized using APC. Following cauterization of oozing resolved. Stomach:    Fundus and Cardia Examined in Retroflexed View: normal, may have small varix.     Body: normal, minimal signs of portal hypertensive gastropathy    Antrum: normal    Duodenum:     Descending: normal    Bulb: normal    While withdrawing the scope the above findings were verified and the scope was removed. The patient has tolerated the procedure without unusual events. Recommendations/Plan:    To keep on PPI therapy and Carafate  F/U In Office as instructed  Discussed with the family                   Electronically signed by Jovan Quinn MD  on 2/13/2023 at 9:43 AM

## 2023-02-13 NOTE — ANESTHESIA POSTPROCEDURE EVALUATION
POST- ANESTHESIA EVALUATION       Pt Name: Frank Lisa  MRN: [id number]  YOB: 1960  Date of evaluation: 2/13/2023  Time:  1:19 PM      /71   Pulse 88   Temp 98.2 °F (36.8 °C) (Temporal)   Resp 12   Ht 5' 10\" (1.778 m)   Wt 202 lb (91.6 kg)   SpO2 99%   BMI 28.98 kg/m²      Consciousness Level  Awake  Cardiopulmonary Status  Stable  Pain Adequately Treated YES  Nausea / Vomiting  NO  Adequate Hydration  YES  Anesthesia Related Complications NONE      Electronically signed by Kyle Sanders MD on 2/13/2023 at 1:19 PM       Department of Anesthesiology  Postprocedure Note    Patient: Frank Lisa  MRN: [id number]  YOB: 1960  Date of evaluation: 2/13/2023      Procedure Summary     Date: 02/13/23 Room / [facility]: [facility] 02 / [facility]    Anesthesia Start: 0914 Anesthesia Stop: 0953    Procedure: EGD WITH APC GOLD PROBE (Esophagus) Diagnosis:       Portal hypertension (HCC)      (Portal hypertension (HCC) [K76.6])    Surgeons: Augusto Cordova MD Responsible Provider: Kyle Sanders MD    Anesthesia Type: TIVA ASA Status: 3          Anesthesia Type: TIVA    Yen Phase I:      Yen Phase II: Yen Score: 10      Anesthesia Post Evaluation

## 2023-02-13 NOTE — ANESTHESIA PRE PROCEDURE
Department of Anesthesiology  Preprocedure Note       Name:  Rodney Krueger   Age:  60 y.o.  :  1960                                          MRN:  [id number]         Date:  2023      Surgeon: Bryant Ambriz):  Krista Barreto MD    Procedure: Procedure(s):  EGD BIOPSY    Medications prior to admission:   Prior to Admission medications    Medication Sig Start Date End Date Taking?  Authorizing Provider   spironolactone (ALDACTONE) 25 MG tablet Take 2 tablets by mouth daily 23   MASSIMO Worthington NP   sucralfate (CARAFATE) 1 GM tablet Take 1 tablet by mouth 4 times daily 23   Jermaine Amador MD   lactulose [facility]) 10 GM/15ML solution take 30 milliliters by mouth three times a day 23   VASU Lemus   furosemide (LASIX) 40 MG tablet Take 1 tablet by mouth in the morning and 1 tablet in the evening. 23   Nusrat Moore DO   pantoprazole (PROTONIX) 40 MG tablet Take 40 mg by mouth daily 10/4/22   Historical Provider, MD   FEROSBRAN 325 (65 Fe) MG tablet take 1 tablet by mouth twice a day 10/7/22   VASU Lemus   atorvastatin (LIPITOR) 20 MG tablet Take 1 tablet by mouth nightly 22   Armen Blanchard MD       Current medications:    Current Facility-Administered Medications   Medication Dose Route Frequency Provider Last Rate Last Admin    lidocaine PF 1 % injection 1 mL  1 mL IntraDERmal Once PRN Peter Parish MD        0.9 % sodium chloride infusion   IntraVENous Continuous Peter Parish MD        sodium chloride flush 0.9 % injection 5-40 mL  5-40 mL IntraVENous 2 times per day Peter Parish MD        sodium chloride flush 0.9 % injection 5-40 mL  5-40 mL IntraVENous PRN Peter Parish MD        0.9 % sodium chloride infusion   IntraVENous PRN Peter Parish MD           Allergies:  No Known Allergies    Problem List:    Patient Active Problem List   Diagnosis Code    Back pain, chronic M54.9, G89.29    Hearing difficulty H91.90    GERD (gastroesophageal reflux disease) K21.9    Cervical radicular pain M54.12    Hypomagnesemia E83.42    Chronic viral hepatitis B without delta agent and without coma (HCC) B18.1    Calculus of gallbladder without cholecystitis K80.20    Hep C w/o coma, chronic (HCC) B18.2    Fatty liver K76.0    Psychophysiologic insomnia F51.04    Cirrhosis (HCC) K74.60    Decompensation of cirrhosis of liver (HCC) K72.90, K74.60    Vertebrogenic low back pain M54.51    DDD (degenerative disc disease), lumbar M51.36    Depression F32. A    Tubular adenoma of colon D12.6    History of colon polyps Z86.010    Gynecomastia, male N58    Lumbar radiculitis M54.16    Lumbar disc herniation M51.26    Tinnitus H93.19    Eustachian tube dysfunction H69.80    Ganglion cyst M67.40    Carpal tunnel syndrome of right wrist G56.01    History of hepatitis C Z86.19    Vitamin D deficiency E55.9    Pure hypercholesterolemia E78.00    Acute hypokalemia E87.6    Essential hypertension I10    Recurrent major depressive disorder in partial remission (HCC) F33.41    S/P epidural steroid injection Z92.241    Elevated LFTs R79.89    Seasonal allergies J30.2    Lumbar facet arthropathy M47.816    Cervical facet syndrome M47.812    Thrombocytopenia (HCC) D69.6    Hepatitis C virus infection resolved after antiviral drug therapy Z86.19    Alcohol abuse F10.10    Altered mental status R41.82    Hypocalcemia E83.51    Hypophosphatemia E83.39    Malignant neoplasm of lower third of esophagus (HCC) C15.5    COVID-19 U07.1    Anxiety F41.9    Current smoker F17.200    Severe comorbid illness R69    Gait instability R26.81    Abnormal findings on diagnostic imaging of spine R93.7    Cervical spinal stenosis M48.02    Spinal stenosis of lumbar region with neurogenic claudication M48.062    Low hemoglobin D64.9    Symptomatic anemia, microcytic, acute D64.9    Hypotension I95.9    Former smoker, 50+ pack years, quit 2016 Z87.891    HLD (hyperlipidemia) E78.5    Esophageal adenocarcinoma (HCC) C15.9    Anemia D64.9    Acute kidney injury ([address] N17.9    Ascites due to alcoholic cirrhosis (HCC) D12.82    Shortness of breath R06.02    Drop in hemoglobin R71.0    Esophageal polyp K22.81    Acute kidney failure, unspecified (HCC) N17.9    Muscle weakness (generalized) M62.81    Other abnormalities of gait and mobility R26.89    GI bleed K92.2    Goals of care, counseling/discussion Z71.89    ACP (advance care planning) Z71.89    Palliative care encounter Z51.5    S/P TIPS (transjugular intrahepatic portosystemic shunt) Z95.828    Lezama's esophagus with dysplasia K22.719    Mastoid disorder, bilateral H74.93    Acute deep vein thrombosis (DVT) of brachial vein of right upper extremity (HCC) I82.621    Chronic hepatitis C without hepatic coma (HCC) B18.2    Swelling of joint of upper arm, right M25.421    Anasarca R60.1       Past Medical History:        Diagnosis Date    Abnormal EKG     Acute deep vein thrombosis (DVT) of brachial vein of right upper extremity ([address] 01/07/2023    Also- Superficial venous thrombosis of the cephalic vein in the forearm    Adenocarcinoma in a polyp ([address]     Alcoholic cirrhosis of liver with ascites ([address]     Anemia 04/13/2022    Anxiety     Arthritis     Back pain, chronic     dr. Tati Lu, orthopedic, every 3-4 months, gets steroid injection    Lezama esophagus     Bleeding gastric varices 12/29/2022    BPH (benign prostatic hypertrophy)     Cholelithiasis     Cirrhosis ([address]     COVID-19 12/2020    pt reports he had a positive test while at [facility] in 2020, was asymptomatic    COVID-19 vaccine series completed 5/20/2021, 6/22/2021    Moderna 5/20/2021, 6/22/2021    DDD (degenerative disc disease), lumbar     Depression     Esophageal cancer ([address]     INVASIVE ADENOCARCINOMA ARISING IN TUBULAR ADENOMA WITH HIGH GRADE DYSPLASIA, ASSOCIATED WITH FOCAL INTESTINAL METAPLASIA     Esophageal varices (HCC)     Fatty liver     GERD (gastroesophageal reflux disease)     GI bleed     Heart murmur     Hep C w/o coma, chronic (HCC)     History of alcohol abuse     6-12 beers a day; quit drinking 2019    History of blood transfusion     History of colon polyps 2016    History of tobacco abuse     [place] (hard of hearing)     Hyperlipidemia     Hypertension     Hyponatremia 07/20/2016    Hypotension 12/20/2021    Mastoid disorder, bilateral 12/31/2022    biLateral mastoid effusion    Pericardial effusion     PONV (postoperative nausea and vomiting)     Poor venous access     has port in place.     Port-A-Cath in place     right upper chest    Portal hypertension (HCC)     Sciatica     Secondary esophageal varices (HCC) 06/07/2022    Shortness of breath     Spinal stenosis     Stomach ulcer     hx of    Thrombocytopenia ([address] 12/23/2020    Tubular adenoma of colon 2016, 2018    Vitamin D deficiency     Wears glasses        Past Surgical History:        Procedure Laterality Date    BUNIONECTOMY      twice on right side    BUNIONECTOMY Left     CARPAL TUNNEL RELEASE Right     COLONOSCOPY      at age 35    COLONOSCOPY  10/05/2016    polyps-pathology tubular adenoma, and abnormal looking mucosa right colon-pathology-tubular adenoma    COLONOSCOPY N/A 03/30/2018    COLONOSCOPY POLYPECTOMY COLD BIOPSY performed by Whitley Ricardo MD at [address] [place] [address]  03/30/2018    Small polyp in the sigmoid colon and excised with biopsy forceps--tubular adenoma    COLONOSCOPY N/A 04/16/2022    COLONOSCOPY POLYPECTOMY performed by Whitley Ricardo MD at [address] [place] [address], DIAGNOSTIC      EGD    ESOPHAGOGASTRODUODENOSCOPY  12/29/2022    ESOPHAGOGASTRODUODENOSCOPY N/A 01/03/2023    IR PORT PLACEMENT EQUAL OR GREATER THAN 5 YEARS  04/19/2021    IR PORT PLACEMENT EQUAL OR GREATER THAN 5 YEARS 4/19/2021 [facility]    IR TIPS INSERTION  12/01/2022    IR TIPS INSERTION 12/1/2022 Saundra Rosas MD [facility]    KNEE SURGERY Left     cyst removed    NASAL SEPTUM SURGERY      NERVE BLOCK Right 11/23/2020    NERVE BLOCK RIGHT CERVICAL STEROID INJECTION  C3-C6 performed by Tacos Mars MD at [facility]  01/04/2016    steroid injection C7 T1    OTHER SURGICAL HISTORY  11/21/2016    Bilateral Lumbar CACHORRO L4-L5 injections    OTHER SURGICAL HISTORY  12/19/2016    lumbar steroid injection    OTHER SURGICAL HISTORY  09/28/2018    BILATERAL L5 CACHORRO (N/A Back)    OTHER SURGICAL HISTORY Right 11/23/2020    cervical injection    PAIN MANAGEMENT PROCEDURE Left 07/09/2020    EPIDURAL STEROID INJECTION LEFT L4 L5 performed by Tacos Mars MD at [address] Left 07/20/2020    LEFT L4 L5 EPIDURAL STEROID INJECTION performed by Tacos Mars MD at [address] Bilateral 08/17/2020    LUMBAR FACET BILATERAL L2-L5 performed by Tacos Mars MD at [address] Bilateral 12/07/2020    NERVE BLOCK BILATERAL LUMBAR MEDIAL BRANCH L2-L5 performed by Tacos Mars MD at [address]      Multiple    MT NEUROPLASTY &/TRANSPOS MEDIAN NRV CARPAL Hammad Karina Right 08/29/2017    CARPAL TUNNEL RELEASE RIGHT performed by Nasima Johnson MD at [facility] &/TRANSPOS MEDIAN NRV CARPAL TUNNE Left 10/31/2017    CARPAL TUNNEL RELEASE performed by Nasima Johnson MD at [facility] AA&/STRD TFRML EPI LUMBAR/SACRAL 1 LEVEL Bilateral 09/06/2018    BILATERAL L5 CACHORRO performed by Tacos Mars MD at [facility] AA&/STRD TFRML EPI LUMBAR/SACRAL 1 LEVEL N/A 09/28/2018    BILATERAL L5 CACHORRO performed by Tacos Mars MD at [facility] N/A 12/29/2020    EGD BIOPSY performed by Nely Cormier MD at [facility] 02/02/2021    EGD BIOPSY and spot marking performed by Augusto Cordova MD at [facility]   • UPPER GASTROINTESTINAL ENDOSCOPY N/A 02/12/2021    ENDOSCOPIC ULTRASOUND, EGD performed by Yo Santos MD at [facility]   • UPPER GASTROINTESTINAL ENDOSCOPY  02/12/2021    EGD DIAGNOSTIC ONLY performed by Yo Santos MD at [facility]   • UPPER GASTROINTESTINAL ENDOSCOPY N/A 08/31/2021    EGD BIOPSY performed by Augusto Cordova MD at [facility]   • UPPER GASTROINTESTINAL ENDOSCOPY N/A 01/21/2022    EGD BIOPSY performed by Augusto Cordova MD at [facility]   • UPPER GASTROINTESTINAL ENDOSCOPY N/A 04/15/2022    EGD ESOPHAGOGASTRODUODENOSCOPY performed by Lucian Harley MD at [facility]   • UPPER GASTROINTESTINAL ENDOSCOPY N/A 06/06/2022    EGD BAND LIGATION performed by Bi Dawkins MD at [facility]   • UPPER GASTROINTESTINAL ENDOSCOPY N/A 06/09/2022    EGD ESOPHAGOGASTRODUODENOSCOPY performed by Bi Dawkins MD at [facility]   • UPPER GASTROINTESTINAL ENDOSCOPY N/A 09/14/2022    EGD ESOPHAGOGASTRODUODENOSCOPY ENDOSCOPIC APC AT GE JUNCTION performed by Jose Mckenzie MD at [facility]   • UPPER GASTROINTESTINAL ENDOSCOPY N/A 10/19/2022    EGD BIOPSY performed by Augusto Cordova MD at [facility]   • UPPER GASTROINTESTINAL ENDOSCOPY N/A 10/31/2022    EGD with APC performed by Augusto Cordova MD at [facility]   • UPPER GASTROINTESTINAL ENDOSCOPY  12/29/2022    EGD ESOPHAGOGASTRODUODENOSCOPY performed by Yo Santos MD at [facility] OR   • UPPER GASTROINTESTINAL ENDOSCOPY N/A 01/03/2023    EGD ESOPHAGOGASTRODUODENOSCOPY performed by Arsen Russo MD at [facility] OR   • UPPER GASTROINTESTINAL ENDOSCOPY N/A 01/26/2023    EGD CONTROL HEMORRHAGE performed by Augusto Cordova MD at [facility]   • WISDOM TOOTH EXTRACTION         Social History:    Social History     Tobacco Use   • Smoking status: Former     Packs/day: 1.00     Years: 45.00     Pack years: 45.00     Types: Cigarettes     Quit date: 1/17/2017     Years since  quittin.0    Smokeless tobacco: Never   Substance Use Topics    Alcohol use: Not Currently     Comment: Quit alcohol in 2019- heavier drinking prior to quitting                                Counseling given: Not Answered      Vital Signs (Current): There were no vitals filed for this visit. BP Readings from Last 3 Encounters:   02/10/23 138/71   23 115/65   23 117/62       NPO Status:                                                                                 BMI:   Wt Readings from Last 3 Encounters:   02/10/23 202 lb 11.2 oz (91.9 kg)   23 210 lb (95.3 kg)   23 210 lb (95.3 kg)     There is no height or weight on file to calculate BMI.    CBC:   Lab Results   Component Value Date/Time    WBC 5.9 2023 07:49 AM    RBC 2.99 2023 07:49 AM    RBC 4.75 2012 11:30 AM    HGB 8.3 2023 03:21 PM    HCT 27.3 2023 03:21 PM    MCV 84.3 2023 07:49 AM    RDW 19.4 2023 07:49 AM    PLT 99 2023 07:49 AM     2012 11:30 AM       CMP:   Lab Results   Component Value Date/Time     2023 07:49 AM    K 3.9 2023 07:49 AM    CL 99 2023 07:49 AM    CO2 26 2023 07:49 AM    BUN 10 2023 07:49 AM    CREATININE 0.67 2023 07:49 AM    GFRAA >60 2022 01:33 PM    LABGLOM >60 2023 07:49 AM    GLUCOSE 137 2023 07:49 AM    GLUCOSE 65 2012 11:30 AM    PROT 5.0 2023 10:00 PM    CALCIUM 8.0 2023 07:49 AM    BILITOT 1.5 2023 10:00 PM    ALKPHOS 197 2023 10:00 PM    AST 69 2023 10:00 PM    ALT 25 2023 10:00 PM       POC Tests: No results for input(s): POCGLU, POCNA, POCK, POCCL, POCBUN, POCHEMO, POCHCT in the last 72 hours.     Coags:   Lab Results   Component Value Date/Time    PROTIME 17.0 2023 10:00 PM    INR 1.4 2023 10:00 PM    APTT 35.9 2023 10:55 AM       HCG (If Applicable): No results found for: PREGTESTUR, PREGSERUM, HCG, HCGQUANT     ABGs: No results found for: PHART, PO2ART, LOQ7GCO, DCR6RJM, BEART, Z2QVHBAV     Type & Screen (If Applicable):  No results found for: LABABO, LABRH    Drug/Infectious Status (If Applicable):  Lab Results   Component Value Date/Time    HEPCAB REACTIVE 12/23/2020 05:09 PM       COVID-19 Screening (If Applicable):   Lab Results   Component Value Date/Time    COVID19 Not Detected 09/12/2022 10:32 PM    COVID19 Not Detected 07/17/2020 10:00 AM           Anesthesia Evaluation  Patient summary reviewed and Nursing notes reviewed   history of anesthetic complications: PONV. Airway: Mallampati: II  TM distance: >3 FB   Neck ROM: full  Mouth opening: > = 3 FB   Dental:          Pulmonary: breath sounds clear to auscultation  (+) shortness of breath: chronic,                             Cardiovascular:    (+) hypertension:,       ECG reviewed  Rhythm: regular  Rate: normal  Echocardiogram reviewed               ROS comment: Echo: 1/23  Normal left ventricle size and function with an estimated EF 55-60%. No segmental wall motion abnormalities seen. Mild left ventricular hypertrophy     Neuro/Psych:   (+) neuromuscular disease:, psychiatric history:            GI/Hepatic/Renal:   (+) GERD:, PUD, hepatitis: C, liver disease: portal hypertension,           Endo/Other:    (+) malignancy/cancer. ROS comment: Hx of Esophageal CA Abdominal:             Vascular: Other Findings:           Anesthesia Plan      general     ASA 3     (TIVA)  Induction: intravenous. Anesthetic plan and risks discussed with patient. Plan discussed with CRNA.                     Norma Atkinson MD   2/13/2023

## 2023-02-15 ENCOUNTER — TELEPHONE (OUTPATIENT)
Dept: GASTROENTEROLOGY | Age: 63
End: 2023-02-15

## 2023-02-15 ENCOUNTER — CARE COORDINATION (OUTPATIENT)
Dept: CASE MANAGEMENT | Age: 63
End: 2023-02-15

## 2023-02-15 DIAGNOSIS — K70.31 ALCOHOLIC CIRRHOSIS OF LIVER WITH ASCITES (HCC): Primary | ICD-10-CM

## 2023-02-15 DIAGNOSIS — C15.9 ESOPHAGEAL ADENOCARCINOMA (HCC): Primary | ICD-10-CM

## 2023-02-15 NOTE — TELEPHONE ENCOUNTER
Writer called pt adv that order has been placed and changed to every 2 weeks instead of every week, pt thanked writer call ended.

## 2023-02-15 NOTE — TELEPHONE ENCOUNTER
Pt returned call to schedule f/u appt, sent pt up to , pt mentioned before being transferred that he gets paracentesis every Friday, states his most recent one couldn't be done properly because he did not have enough fluid, pt is wondering if order can be extended to every other week instead of every week, adv pt would have to send message to Dr Jen Flores NP to see if this can be done, pt thanked writer call ended, please adv.

## 2023-02-15 NOTE — CARE COORDINATION
Care Transitions Outreach Attempt    Attempted to reach patient for transitions of care follow up. Unable to reach patient. Patient: Kobe Porter Patient : 1960 MRN: [id number]    Last Discharge [address]       Date Complaint Diagnosis Description Type Department Provider    23   Admission (Discharged) Lyubov Samson MD          Message left in compliance with HIPPA. Stated who I was, representing the [facility] SPECIALTY [facility], Care Transitions Team. Instructed to call PCP with any immediate health needs/questions/concerns. Care transitions will continue to follow.      12[address] Alanna Zarate follow up appointment(s):   Future Appointments   Date Time Provider Carolyn English   2023  1:15 PM Pb Strickland MD [facility] CANCER [facility]   2023  1:00 PM STC IR  [facility] Radiolog   2023  1:00 PM STC IR  [facility] Radiolog   3/3/2023 10:00 AM MASSIMO Interiano - NP [facility] GI [facility]   3/3/2023  1:00 PM STC IR  [facility] STC Radiolog   3/10/2023  1:00 PM STC IR  [facility] STC Radiolog   3/17/2023  1:00 PM STC IR  [facility] Radiolog   3/24/2023  1:00 PM STC IR  [facility] STC Radiolog   3/31/2023  1:00 PM STC IR  [facility] Radiolog   2023  1:00 PM STC IR  [facility] Radiolog   2023  1:00 PM STC IR  [facility] Radiolog   2023  1:00 PM STC IR  [facility] Radiolog   2023  1:00 PM STC IR  [facility] Radiolog   2023  1:00 PM STC IR  [facility] Radiolog   2023  1:00 PM STC IR  [facility] Radiolog   2023  1:00 PM STC IR  [facility] Radiolog   2023  1:00 PM STC IR  [facility] Radiolog   2023  1:00 PM STC IR  [facility] Radiolog   2023  1:00 PM STC IR  [facility] Radiolog   2023  1:00 PM STC IR  [facility] STC Radiolog   2023  1:00 PM STC IR  [facility] Radiolog

## 2023-02-15 NOTE — TELEPHONE ENCOUNTER
New order placed. Please advise.     Electronically signed by MASSIMO Carmona NP on 2/15/2023 at 12:07 PM

## 2023-02-16 ENCOUNTER — CARE COORDINATION (OUTPATIENT)
Dept: CASE MANAGEMENT | Age: 63
End: 2023-02-16

## 2023-02-16 ENCOUNTER — TELEPHONE (OUTPATIENT)
Dept: INTERVENTIONAL RADIOLOGY/VASCULAR | Age: 63
End: 2023-02-16

## 2023-02-16 NOTE — TELEPHONE ENCOUNTER
Received a VM from Umu wesley [address] [place] [address]  who would like a call back to go over the paracentesis orders.     Umu Foster [phone]

## 2023-02-16 NOTE — CARE COORDINATION
[facility] Care Transitions Follow Up Call    Patient Current Location:  Home: [facility] [address] [place] [address]    Care Transition Nurse contacted the patient by telephone to follow up after admission on 23. Verified name and  with patient as identifiers. Patient: Tirso Easton  Patient : 1960   MRN: [id number]  Reason for Admission: Anemia  Discharge Date: 23 RARS: Readmission Risk Score: 32.7      Needs to be reviewed by the provider   Additional needs identified to be addressed with provider: No  none             Method of communication with provider: none. Spoke to Lane Garcia for transitions follow up. Pt stated he isn't feeling well today, has a sore throat, nausea and headache. Pt is not sleeping well, is up all night and trying to sleep during the day. Informed sore throat likely related to recent EGD. Advised to stay hydrated, rest. Pt has Zofran for nausea. Stated he does not have anything at home for headache. Pt was getting paracentesis q Friday, did not have enough fluid to remove last week, has order changed to every other Friday now. Pt is still listed on schedule for q Friday. Stated he wasn't planning on going tomorrow. Pt has appt with Dr Kaitlin Saavedra on 23. Writer called Dr Brennon Damon office, appt for paracentesis was removed for tomorrow. Pt continues with [facility] weekly. Addressed changes since last contact:   Pt changed to paracentesis to every 2 weeks  Discussed follow-up appointments.    Follow Up  Future Appointments   Date Time Provider Carolyn English   2023  1:00 PM STC IR  [facility] ST[facility] Radiolog   2023 11:00 AM Genesis Hand MD [facility] CANCER [facility]   2023  1:00 PM STC IR  [facility] Radiolog   3/3/2023 10:00 AM MASSIMO Nicolas NP [facility] GI [facility]   3/3/2023  1:00 PM STC IR  [facility] Radiolog   3/10/2023  1:00 PM STC IR  [facility] Radiolog   3/17/2023  1:00 PM [place]C [place] [id number] Doug Herbert STC Radiolog   3/24/2023  1:00 PM STC IR  [facility] Radiolog   3/31/2023  1:00 PM STC IR  [facility] Radiolog   4/7/2023  1:00 PM STC IR  [facility] Radiolog   4/14/2023  1:00 PM STC IR  [facility] Radiolog   4/21/2023  1:00 PM STC IR  [facility] Radiolog   4/28/2023  1:00 PM STC IR  [facility] Radiolog   5/5/2023  1:00 PM STC IR  [facility] STC Radiolog   5/12/2023  1:00 PM STC IR  [facility]C Radiolog   5/19/2023  1:00 PM STC IR  [facility] Radiolog   5/26/2023  1:00 PM STC IR  [facility] Radiolog   6/2/2023  1:00 PM STC IR  [facility] Radiolog   6/9/2023  1:00 PM STC IR  [facility] Radiolog   6/16/2023  1:00 PM STC IR  [facility] STC Radiolog   6/23/2023  1:00 PM STC IR [address] Radiolog         Care Transition Nurse reviewed discharge instructions with patient and discussed any barriers to care and/or understanding of plan of care after discharge. Discussed appropriate site of care based on symptoms and resources available to patient including: PCP  Specialist  Home health  When to call [address] The patient agrees to contact the PCP office for questions related to their healthcare. Patients top risk factors for readmission: medical condition-cirrhosis of liver  Interventions to address risk factors: Scheduled appointment with Specialist-canceled appt for paracentesis tomorrow per pt request and Obtained and reviewed discharge summary and/or continuity of care documents         Care Transitions Subsequent and Final Call    Subsequent and Final Calls  Do you have any ongoing symptoms?: Yes  Onset of Patient-reported symptoms:  In the past 7 days  Patient-reported symptoms: Nausea, Fatigue, Other  Interventions for patient-reported symptoms: Other  Have your medications changed?: No  Do you have any questions related to your medications?: No  Do you currently have any active services?: Yes  Are you currently active with any services?: Home Health  Do you have any needs or concerns that I can assist you with?: No  Identified Barriers: Other  Care Transitions Interventions     Other Therapy Services: Completed      Other Services: Completed   Disease Association: Completed Palliative Care: Completed     Other Interventions:             Care Transition Nurse provided contact information for future needs. Plan for follow-up call in 5-7 days based on severity of symptoms and risk factors.   Plan for next call: symptom management-swelling, nausea, sore throat, headache  follow-up appointment-Dr Omar Adams review, reminder of upcoming appts    Yolanda Watson RN

## 2023-02-17 DIAGNOSIS — K70.30 ALCOHOLIC CIRRHOSIS, UNSPECIFIED WHETHER ASCITES PRESENT (HCC): ICD-10-CM

## 2023-02-19 RX ORDER — LACTULOSE 10 G/15ML
SOLUTION ORAL
Qty: 473 ML | Refills: 1 | Status: SHIPPED | OUTPATIENT
Start: 2023-02-19

## 2023-02-21 ENCOUNTER — OFFICE VISIT (OUTPATIENT)
Dept: ONCOLOGY | Age: 63
End: 2023-02-21
Payer: MEDICARE

## 2023-02-21 ENCOUNTER — HOSPITAL ENCOUNTER (OUTPATIENT)
Age: 63
Discharge: HOME | End: 2023-02-21
Payer: MEDICARE

## 2023-02-21 VITALS
RESPIRATION RATE: 16 BRPM | TEMPERATURE: 98.2 F | SYSTOLIC BLOOD PRESSURE: 119 MMHG | HEART RATE: 92 BPM | BODY MASS INDEX: 29.31 KG/M2 | DIASTOLIC BLOOD PRESSURE: 68 MMHG | WEIGHT: 204.3 LBS

## 2023-02-21 DIAGNOSIS — C15.9 ESOPHAGEAL ADENOCARCINOMA (HCC): Primary | ICD-10-CM

## 2023-02-21 DIAGNOSIS — C15.9 ESOPHAGEAL ADENOCARCINOMA (HCC): ICD-10-CM

## 2023-02-21 LAB
ABSOLUTE EOS #: 0.1 K/UL (ref 0–0.4)
ABSOLUTE LYMPH #: 0.5 K/UL (ref 1–4.8)
ABSOLUTE MONO #: 0.6 K/UL (ref 0.1–1.2)
BASOPHILS # BLD: 1 % (ref 0–2)
BASOPHILS ABSOLUTE: 0 K/UL (ref 0–0.2)
EOSINOPHILS RELATIVE PERCENT: 3 % (ref 1–4)
HCT VFR BLD AUTO: 26.8 % (ref 41–53)
HGB BLD-MCNC: 8.1 G/DL (ref 13.5–17.5)
LYMPHOCYTES # BLD: 17 % (ref 24–44)
MCH RBC QN AUTO: 24.1 PG (ref 26–34)
MCHC RBC AUTO-ENTMCNC: 30.3 G/DL (ref 31–37)
MCV RBC AUTO: 79.6 FL (ref 80–100)
MONOCYTES # BLD: 19 % (ref 2–11)
PDW BLD-RTO: 18.3 % (ref 12.5–15.4)
PLATELET # BLD AUTO: 126 K/UL (ref 140–450)
PMV BLD AUTO: 6.6 FL (ref 6–12)
RBC # BLD: 3.37 M/UL (ref 4.5–5.9)
SEG NEUTROPHILS: 60 % (ref 36–66)
SEGMENTED NEUTROPHILS ABSOLUTE COUNT: 1.9 K/UL (ref 1.8–7.7)
WBC # BLD AUTO: 3.1 K/UL (ref 3.5–11)

## 2023-02-21 PROCEDURE — 3074F SYST BP LT 130 MM HG: CPT | Performed by: INTERNAL MEDICINE

## 2023-02-21 PROCEDURE — 85025 COMPLETE CBC W/AUTO DIFF WBC: CPT

## 2023-02-21 PROCEDURE — 99211 OFF/OP EST MAY X REQ PHY/QHP: CPT | Performed by: INTERNAL MEDICINE

## 2023-02-21 PROCEDURE — 36415 COLL VENOUS BLD VENIPUNCTURE: CPT

## 2023-02-21 PROCEDURE — 99214 OFFICE O/P EST MOD 30 MIN: CPT | Performed by: INTERNAL MEDICINE

## 2023-02-21 PROCEDURE — 3078F DIAST BP <80 MM HG: CPT | Performed by: INTERNAL MEDICINE

## 2023-02-21 NOTE — PROGRESS NOTES
Patient ID: Dwight Mohr, [birth year], [id number], 60 y.o. Referred by : Dr Mike Rosas  Reason for consultation:   Esophageal cancer T2N0Mx  Stage II   started on concurrent chemoradiation preoperatively on 5/4/21  Chemoradiation completed 6/9/21  Patient had a PET scan on 7/23/2021 which showed no evidence of recurrence  Patient has been evaluated by thoracic surgeon at [facility] Dr. Norma [place] and also hepatologist Dr. Rivas Second his case was discussed at thoracic tumor oncology board with recommendations NOT to do any surgery because of his liver failure. So surveillance recommended  He had an upper endoscopy on 8/31/21 which showed no residual cancer but showed Lezama's esophagus with high-grade dysplasia. Another endoscopy on 1/21/2022 was negative for recurrence  HISTORY OF PRESENT ILLNESS:    Oncologic History:  Dwight Mohr is a 60 y.o. male with a history of hepatitis C, status post treatment, liver cirrhosis, history of alcohol abuse was seen during initial consultation visit for newly diagnosed esophageal cancer. Patient reportedly had \"heavy drinking alcohol in about 2016 however recently in December he had 2 beers and he presented with hematemesis. On 12/29/2020 he had upper endoscopy which showed severe portal hypertension, gastropathy. The biopsy from the GE junction showed invasive adenocarcinoma. Patient had a follow-up EGD on 2/2/2021 which confirmed esophageal adenocarcinoma. Patient had endoscopic ultrasound on 2/12/2021 which showed T2 N0 MX disease with underlying esophageal varices. In the esophagus hypoechoic lesion noted in the lower esophagus penetrating into submucosa and into muscularis propria. No lymphadenopathy noted. Patient was referred to medical oncology for further recommendations. He denies any difficulty swallowing. He does not smoke he has quit smoking in 2016. He has not drank alcohol since December.   He has a history of hepatitis C and he has received treatment. He lives by himself. He is accompanied by his ex-wife during today's office visit. INTERVAL HISTORY:  Patient is returning for follow-up visit and to discuss lab results and further recommendations. His hemoglobin is stable at 8.1. Denies any bleeding symptoms he is following with gastroenterology. He denies any abdominal pain nausea vomiting. His recent platelets are slightly improved. During this visit patient's allergy, social, medical, surgical history and medications were reviewed and updated.     Past Medical History:   Diagnosis Date    Abnormal EKG     Acute deep vein thrombosis (DVT) of brachial vein of right upper extremity ([address] 01/07/2023    Also- Superficial venous thrombosis of the cephalic vein in the forearm    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 Callum Cali, orthopedic, every 3-4 months, gets steroid injection    Lezama esophagus     Bleeding gastric varices 12/29/2022    BPH (benign prostatic hypertrophy)     Cholelithiasis     Cirrhosis ([address]     COVID-19 12/2020    pt reports he had a positive test while at [facility] in 2020, was asymptomatic    COVID-19 vaccine series completed 5/20/2021, 6/22/2021    Moderna 5/20/2021, 6/22/2021    DDD (degenerative disc disease), lumbar     Depression     Esophageal cancer ([address]     INVASIVE ADENOCARCINOMA ARISING IN TUBULAR ADENOMA WITH HIGH GRADE DYSPLASIA, ASSOCIATED WITH FOCAL INTESTINAL METAPLASIA     Esophageal varices ([address]     Fatty liver     GERD (gastroesophageal reflux disease)     GI bleed     Heart murmur     Hep C w/o coma, chronic ([address]     History of alcohol abuse     6-12 beers a day; quit drinking 2019    History of blood transfusion     History of colon polyps 2016    History of tobacco abuse     [place] (hard of hearing)     Hyperlipidemia     Hypertension     Hyponatremia 07/20/2016    Hypotension 12/20/2021    Mastoid disorder, bilateral 12/31/2022    biLateral mastoid effusion    Pericardial effusion     PONV (postoperative nausea and vomiting)     Poor venous access     has port in place.     Port-A-Cath in place     right upper chest    Portal hypertension ([address]     Sciatica     Secondary esophageal varices ([address] 06/07/2022    Shortness of breath     Spinal stenosis     Stomach ulcer     hx of    Thrombocytopenia ([address] 12/23/2020    Tubular adenoma of colon 2016, 2018    Vitamin D deficiency     Wears glasses        Past Surgical History:   Procedure Laterality Date    BUNIONECTOMY      twice on right side    BUNIONECTOMY Left     CARPAL TUNNEL RELEASE Right     COLONOSCOPY      at age 35    COLONOSCOPY  10/05/2016    polyps-pathology tubular adenoma, and abnormal looking mucosa right colon-pathology-tubular adenoma    COLONOSCOPY N/A 03/30/2018    COLONOSCOPY POLYPECTOMY COLD BIOPSY [facility] MD [facility] [address]  03/30/2018    Small polyp in the sigmoid colon and excised with biopsy forceps--tubular adenoma    COLONOSCOPY N/A 04/16/2022    COLONOSCOPY POLYPECTOMY performed by Mike Dixon MD at [facility], DIAGNOSTIC      EGD    ESOPHAGOGASTRODUODENOSCOPY  12/29/2022    ESOPHAGOGASTRODUODENOSCOPY N/A 01/03/2023    IR PORT PLACEMENT EQUAL OR GREATER THAN 5 YEARS  04/19/2021    IR PORT PLACEMENT EQUAL OR GREATER THAN 5 YEARS 4/19/2021 [facility]    IR TIPS INSERTION  12/01/2022    IR TIPS INSERTION 12/1/2022 Cleo Wills MD [facility]    KNEE SURGERY Left     cyst removed    NASAL SEPTUM SURGERY      NERVE BLOCK Right 11/23/2020    NERVE BLOCK RIGHT CERVICAL STEROID INJECTION  C3-C6 performed by Ava Mack MD at [facility]  01/04/2016    steroid injection C7 T1    OTHER SURGICAL HISTORY  11/21/2016    Bilateral Lumbar CACHORRO L4-L5 injections    OTHER SURGICAL HISTORY 12/19/2016    lumbar steroid injection    OTHER SURGICAL HISTORY  09/28/2018    BILATERAL L5 CACHORRO (N/A Back)    OTHER SURGICAL HISTORY Right 11/23/2020    cervical injection    PAIN MANAGEMENT PROCEDURE Left 07/09/2020    EPIDURAL STEROID INJECTION LEFT L4 L5 performed by Rafael Jaimes MD at [facility] Left 07/20/2020    LEFT L4 L5 EPIDURAL STEROID INJECTION performed by Rafael Jaimes MD at [facility] Bilateral 08/17/2020    LUMBAR FACET BILATERAL L2-L5 performed by Rafael Jaimes MD at [facility] Bilateral 12/07/2020    NERVE BLOCK BILATERAL LUMBAR MEDIAL BRANCH L2-L5 performed by Rafael Jaimes MD at [facility] [employer] NEUROPLASTY &/TRANSPOS MEDIAN NRV CARPAL Calvin Bridges Right 08/29/2017    CARPAL TUNNEL RELEASE RIGHT performed by Deanna Sharma MD at [address] [facility] [employer] CARPAL TUNNE Left 10/31/2017    CARPAL TUNNEL RELEASE performed by Deanna Sharma MD at [facility] [address] AA&/STRD TFRML EPI LUMBAR/SACRAL 1 LEVEL Bilateral 09/06/2018    BILATERAL L5 CACHORRO performed by Rafael Jaimes MD at [facility] [address] AA&/STRD TFRML EPI LUMBAR/SACRAL 1 LEVEL N/A 09/28/2018    BILATERAL L5 CACHORRO performed by Rafael Jaimes MD at [address] [place] [address] 12/29/2020    EGD BIOPSY performed by Sparkle Kaufman MD at [address] [place] [address] 02/02/2021    EGD BIOPSY and spot marking performed by Ana Laura Flores MD at [address] [place] [address] N/A 02/12/2021    ENDOSCOPIC ULTRASOUND, EGD performed by Missy Mar MD at [facility] 1  02/12/2021    EGD DIAGNOSTIC ONLY performed by Missy Mar MD at [facility] 1 08/31/2021    EGD BIOPSY performed by Ana Laura Flores MD at [address] [place] [address] N/A 01/21/2022 EGD BIOPSY performed by Deacon Plata MD at [address] [place] [address] 04/15/2022    EGD ESOPHAGOGASTRODUODENOSCOPY performed by Tamar Perdomo MD at [address] [place] [address] 06/06/2022    EGD BAND LIGATION performed by Shaina Torres MD at [address] [place] [address] N/A 06/09/2022    EGD ESOPHAGOGASTRODUODENOSCOPY performed by Shaina Torres MD at [address] [place] [address] N/A 09/14/2022    EGD ESOPHAGOGASTRODUODENOSCOPY [facility] performed by Daquan Eng MD at [address] [place] [address] 10/19/2022    EGD BIOPSY performed by Deacon Plata MD at [address] [place] [address] 10/31/2022    EGD with APC performed by Deacon Plata MD at [address] [place] [address]  12/29/2022    EGD ESOPHAGOGASTRODUODENOSCOPY performed by Laure Salvador MD at [address] [place] [address] 01/03/2023    EGD ESOPHAGOGASTRODUODENOSCOPY performed by Jayne Granados MD at [address] [place] [address] 01/26/2023    EGD CONTROL HEMORRHAGE performed by Deacon Plata MD at [address] [place] [address] 2/13/2023    EGD WITH APC GOLD PROBE performed by Deacon Plata MD at [address] [place] [address]       No Known Allergies      Current Outpatient Medications   Medication Sig Dispense Refill    lactulose (CHRONULAC) 10 GM/15ML solution take 30 milliliters by mouth three times a day 473 mL 1    spironolactone (ALDACTONE) 25 MG tablet Take 2 tablets by mouth daily 30 tablet 3    sucralfate (CARAFATE) 1 GM tablet Take 1 tablet by mouth 4 times daily 120 tablet 3    furosemide (LASIX) 40 MG tablet Take 1 tablet by mouth in the morning and 1 tablet in the evening.  60 tablet 3    pantoprazole (PROTONIX) 40 MG tablet Take 40 mg by mouth daily      FEROSUL 325 (65 Fe) MG tablet take 1 tablet by mouth twice a day 60 tablet 5    atorvastatin (LIPITOR) 20 MG tablet Take 1 tablet by mouth nightly 30 tablet 3     No current facility-administered medications for this visit. Social History     Socioeconomic History    Marital status: Single     Spouse name: Not on file    Number of children: Not on file    Years of education: Not on file    Highest education level: Not on file   Occupational History    Not on file   Tobacco Use    Smoking status: Former     Packs/day: 1.00     Years: 45.00     Pack years: 45.00     Types: Cigarettes     Quit date: 2017     Years since quittin.0    Smokeless tobacco: Never   Vaping Use    Vaping Use: Never used   Substance and Sexual Activity    Alcohol use: Not Currently     Comment: Quit alcohol in 2019- heavier drinking prior to quitting    Drug use: Not Currently     Frequency: 1.0 times per week     Types: Cocaine     Comment: Cocaine- stopped spring 2016    Sexual activity: Yes     Partners: Female   Other Topics Concern    Not on file   Social History Narrative     in the past, retired     Social Determinants of Health     Financial Resource Strain: Low Risk     Difficulty of Paying Living Expenses: Not hard at all   Food Insecurity: No Food Insecurity    Worried About [employer] in the Last Year: Never true    [facility] in the Last Year: Never true   Transportation Needs: Not on file   Physical Activity: Inactive    Days of Exercise per Week: 0 days    Minutes of Exercise per Session: 0 min   Stress: Not on file   Social Connections: Not on file   Intimate Partner Violence: Not on file   Housing Stability: Not on file       Family History   Problem Relation Age of Onset    Cancer Mother         pancreatic    Cancer Father         bone    Diabetes Sister     Asthma Brother     Allergies Brother         MULTIPLE        REVIEW OF SYSTEM:     Constitutional: No fever or chills.  No night sweats, no weight loss   Eyes: No eye discharge, double vision, or eye pain   HEENT: negative for sore mouth, sore throat, hoarseness and voice change   Respiratory: negative for cough , sputum, dyspnea, wheezing, hemoptysis, chest pain   Cardiovascular: negative for chest pain, dyspnea, palpitations, orthopnea, PND   Gastrointestinal: negative for nausea, vomiting, diarrhea, constipation, abdominal pain, Dysphagia, hematemesis and hematochezia   Genitourinary: negative for frequency, dysuria, nocturia, urinary incontinence, and hematuria   Integument: negative for rash, skin lesions, bruises.    Hematologic/Lymphatic: negative for easy bruising, bleeding, lymphadenopathy, petechiae and swelling/edema   Endocrine: negative for heat or cold intolerance, tremor, weight changes, change in bowel habits and hair loss   Musculoskeletal: negative for myalgias, arthralgias, pain, joint swelling,and bone pain   Neurological: negative for headaches, dizziness, seizures, weakness, numbness       OBJECTIVE:         Vitals:    02/21/23 1149   BP: 119/68   Pulse: 92   Resp: 16   Temp: 98.2 °F (36.8 °C)       PHYSICAL EXAM:   General appearance - well appearing, no in pain or distress   Mental status - alert and cooperative   Eyes - pupils equal and reactive, extraocular eye movements intact   Ears - bilateral TM's and external ear canals normal   Mouth - mucous membranes moist, pharynx normal without lesions   Neck - supple, no significant adenopathy   Lymphatics - no palpable lymphadenopathy, no hepatosplenomegaly   Chest - clear to auscultation, no wheezes, rales or rhonchi, symmetric air entry   Heart - normal rate, regular rhythm, normal S1, S2, no murmurs, rubs, clicks or gallops   Abdomen - soft, nontender, nondistended, no masses or organomegaly   Neurological - alert, oriented, normal speech, no focal findings or movement disorder noted   Musculoskeletal - no joint tenderness, deformity or swelling   Extremities - peripheral pulses normal, no pedal edema, no clubbing or cyanosis   Skin - normal coloration and turgor, no rashes, no suspicious skin lesions noted   LABORATORY DATA:     Lab Results   Component Value Date    WBC 3.1 (L) 02/21/2023    HGB 8.1 (L) 02/21/2023    HCT 26.8 (L) 02/21/2023    MCV 79.6 (L) 02/21/2023     (L) 02/21/2023    LYMPHOPCT 17 (L) 02/21/2023    RBC 3.37 (L) 02/21/2023    MCH 24.1 (L) 02/21/2023    MCHC 30.3 (L) 02/21/2023    RDW 18.3 (H) 02/21/2023    MONOPCT 19 (H) 02/21/2023    BASOPCT 1 02/21/2023    NEUTROABS 1.90 02/21/2023    LYMPHSABS 0.50 (L) 02/21/2023    MONOSABS 0.60 02/21/2023    EOSABS 0.10 02/21/2023    BASOSABS 0.00 02/21/2023       Chemistry        Component Value Date/Time     (L) 01/27/2023 0749    K 3.9 01/27/2023 0749    CL 99 01/27/2023 0749    CO2 26 01/27/2023 0749    BUN 10 01/27/2023 0749    CREATININE 0.67 (L) 01/27/2023 0749        Component Value Date/Time    CALCIUM 8.0 (L) 01/27/2023 0749    ALKPHOS 197 (H) 01/24/2023 2200    AST 69 (H) 01/24/2023 2200    ALT 25 01/24/2023 2200    BILITOT 1.5 (H) 01/24/2023 2200        PATHOLOGY DATA:   Surgical Pathology Report  Surgical Pathology  Collected: 12/29/20 1334   Lab status: Final   Resulting lab: [facility]   Value: [id number]   [address]   [phone]   Fax: [phone]   SURGICAL PATHOLOGY REPORT     Patient Name: Jose Juan Wilson   MR#: [id number]   Specimen #[id number]         Final Diagnosis   GASTROESOPHAGEAL JUNCTION, BIOPSY:          INVASIVE ADENOCARCINOMA    Final Diagnosis   ESOPHAGUS, LESION AT 34 CM, BIOPSY:          INVASIVE ADENOCARCINOMA ARISING IN TUBULAR ADENOMA WITH HIGH   GRADE DYSPLASIA, ASSOCIATED WITH FOCAL INTESTINAL METAPLASIA (SEE   COMMENT)     Diagnosis Comment   The malignancy diagnosis is confirmed by review of a second   pathologist.  The result of HER2 IHC with reflex to [address] [place] [address] for   gastroesophageal adenocarcinoma will be issued in an addendum. ADDENDUM ([facility])     Date Ordered:     2/16/2021     Status: Signed Out        Date Complete:     2/16/2021     By: Sandee Springer M.D. Date Reported:     2/16/2021       ADDENDUM COMMENT   This addendum is issued in order to incorporate the result of HER2 by   [address] [place] [address], performed at [address] [place], [place] [address] ([id number], 02/16/2021). \"RESULTS:  INDETERMINATE     Interpretation:     Average HER2 signals/nucleus:  4.4   Average SUNG 17 signals/nucleus:  3.4   HER2/SUNG 17 signal ratio:  1.3   Number of Observers:  2     Even after analysis of additional cells and review of slides by a   pathologist, FISH results show a HER2/SUNG 17 ratio < 2.0 and an   average of 4-6 HER2 signals per nucleus and 3 or more SUNG 17 signals   per nucleus. The results are INDETERMINATE. In this situation, the 2016 CAP/ASCP/ASCO guidelines recommend   selecting a different tumor block for HER2 testing, if available. \"       Of note, there is only one block available for testing of invasive   esophageal adenocarcinoma tumor cells. It was already used for HER2   IHC and HER2 FISH testing with the results issued in the addendums. IMAGING DATA:    Reviewed  CT chest abdomen pelvis 10/20/2021  Impression   1. Stable exam of the chest, abdomen and pelvis without evidence for   metastatic disease. 2.  The esophagus is decompressed. Mucosal enhancement in the distal   esophagus may be due to underlying varices. Underlying inflammation or mass   is considered less likely given the stability of this finding and recent   negative PET-CT. 3.  Morphologic findings of cirrhosis and portal hypertension again   demonstrated. No focal liver lesion identified. Trace abdominopelvic   ascites. Small varices. ASSESSMENT:    Sandeep Zamora is a 60 y.o. male with history of hepatitis C, liver cirrhosis, history of alcohol abuse, with esophageal cancer.   I reviewed the NCCN guidelines and goals of care.  Clinically appears to have T2 N0 M0  Stag II, disease. Started on preoperative  concurrent chemoradiation  Now he has completed chemoradiation  A PET scan on 7/21/21 after chemoradiation showed no metabolic evidence of active neoplasm. Induced at [place] [facility] recommended surveillance. Every week    TIPS next week    During today's visit, the patient and the family had a number of reasonable questions which were answered to their satisfaction. They verbalized understanding of the information provided and they agreed to proceed as outlined above. PLAN:   I reviewed his recent lab work, discussed diagnosis and treatment recommendations  Hemoglobin is stable  Denies any bleeding symptoms  Platelets improved  Swallowing okay  We will plan restaging scans and lab work in 3 months  Return to clinic in 3 months with labs and scans prior    Garrison Key MD  Hematologist/Medical Oncologist    On this date 2/21/23  I have spent 40 minutes reviewing previous notes, test results and face to face with the patient discussing the diagnosis and importance of compliance with the treatment plan. Greater than 50% of that time was spent face-to-face with the patient in counseling and coordinating her care. This note is created with the assistance of a speech recognition program.  While intending to generate a document that actually reflects the content of the visit, the document can still have some errors including those of syntax and sound a like substitutions which may escape proof reading. It such instances, actual meaning can be extrapolated by contextual diversion.

## 2023-02-22 ENCOUNTER — TELEPHONE (OUTPATIENT)
Dept: ONCOLOGY | Age: 63
End: 2023-02-22

## 2023-02-22 NOTE — TELEPHONE ENCOUNTER
AVS from 2/21/23      RTC in 3 [facility] labs and scan    Rv scheduled for 5/30 @ 11:00 am     Ct scheduled for 5/22 @ 1:15 pm     Pt was given AVS and appointment schedule    Electronically signed by Joyce Miner on 2/22/2023 at 9:15 AM

## 2023-02-23 ENCOUNTER — CARE COORDINATION (OUTPATIENT)
Dept: CASE MANAGEMENT | Age: 63
End: 2023-02-23

## 2023-02-23 NOTE — CARE COORDINATION
Care Transitions Outreach Attempt      Attempted to reach patient for transitions of care follow up. Unable to reach patient. Caller left a HIPAA compliant message with contact information for a return call. Patient: Hardik Yang Patient : 1960 MRN: <[id number]>    Last Discharge [address]       Date Complaint Diagnosis Description Type Department Provider    23   Admission (Discharged) Rickie Ferreira MD              Was this an external facility discharge?  No Discharge Facility: [address] [place] [address],[address]    Noted following upcoming appointments from discharge chart review:   [facility] follow up appointment(s):   Future Appointments   Date Time Provider Carolyn English   2023  1:00  [facility] IR [address] Radiolog   3/3/2023 10:00 AM MASSIMO Porras NP [facility]   3/3/2023  1:00 PM [facility] IR  [facility] Radiolog   3/10/2023  1:00 PM [facility] IR  [facility] Radiolog   3/17/2023  1:00 PM [facility]  [facility] Radiolog   3/24/2023  1:00 PM [facility]  [facility] Radiolog   3/31/2023  1:00 PM [facility]  [facility] Radiolog   2023  1:00 PM [facility]  [facility] Radiolog   2023  1:00 PM [facility]  [facility] Radiolog   2023  1:00 PM [facility]  [facility] Radiolog   2023  1:00 PM [facility]  [facility] Radiolog   2023  1:00 PM [facility]  [facility] Radiolog   2023  1:00 PM [facility]  [facility] Radiolog   2023  1:00 PM [facility] IR  [facility] Radiolog   2023  1:15 PM STC CT RM 1 [facility] CT SCAN [facility] Radiolog   2023  1:00 PM [facility] IR  [facility] Radiolog   2023 11:00 AM Margaret Carrasco MD [facility]   2023  1:00 PM [facility] IR  [facility] ST Radiolog   2023  1:00 PM [facility] IR  [facility] Radiolog   2023  1:00 PM [facility]  [facility] Radiolog   2023 1:00 PM [facility]  [facility] ST Radiolog

## 2023-02-24 ENCOUNTER — HOSPITAL ENCOUNTER (OUTPATIENT)
Dept: INTERVENTIONAL RADIOLOGY/VASCULAR | Age: 63
End: 2023-02-24
Payer: MEDICARE

## 2023-02-24 VITALS
OXYGEN SATURATION: 98 % | TEMPERATURE: 97.1 F | HEART RATE: 110 BPM | RESPIRATION RATE: 18 BRPM | DIASTOLIC BLOOD PRESSURE: 78 MMHG | SYSTOLIC BLOOD PRESSURE: 135 MMHG

## 2023-02-24 DIAGNOSIS — K70.31 ALCOHOLIC CIRRHOSIS OF LIVER WITH ASCITES (HCC): ICD-10-CM

## 2023-02-24 LAB
INR PPP: 1.4
PARTIAL THROMBOPLASTIN TIME: 33.2 SEC (ref 24–36)
PLATELET # BLD AUTO: 141 K/UL (ref 150–450)
PROTHROMBIN TIME: 17.1 SEC (ref 11.8–14.6)

## 2023-02-24 PROCEDURE — 85730 THROMBOPLASTIN TIME PARTIAL: CPT

## 2023-02-24 PROCEDURE — 36415 COLL VENOUS BLD VENIPUNCTURE: CPT

## 2023-02-24 PROCEDURE — 85049 AUTOMATED PLATELET COUNT: CPT

## 2023-02-24 PROCEDURE — 76775 US EXAM ABDO BACK WALL LIM: CPT

## 2023-02-24 PROCEDURE — 85610 PROTHROMBIN TIME: CPT

## 2023-02-24 RX ORDER — SODIUM CHLORIDE 0.9 % (FLUSH) 0.9 %
5-40 SYRINGE (ML) INJECTION EVERY 12 HOURS SCHEDULED
Status: DISCONTINUED | OUTPATIENT
Start: 2023-02-24 | End: 2023-02-27 | Stop reason: HOSPADM

## 2023-02-24 RX ORDER — SODIUM CHLORIDE 0.9 % (FLUSH) 0.9 %
5-40 SYRINGE (ML) INJECTION PRN
Status: DISCONTINUED | OUTPATIENT
Start: 2023-02-24 | End: 2023-02-27 | Stop reason: HOSPADM

## 2023-02-24 RX ORDER — SODIUM CHLORIDE 9 MG/ML
INJECTION, SOLUTION INTRAVENOUS PRN
Status: DISCONTINUED | OUTPATIENT
Start: 2023-02-24 | End: 2023-02-27 | Stop reason: HOSPADM

## 2023-02-24 ASSESSMENT — PAIN - FUNCTIONAL ASSESSMENT: PAIN_FUNCTIONAL_ASSESSMENT: NONE - DENIES PAIN

## 2023-02-24 NOTE — INTERVAL H&P NOTE
Addendum: Procedure not done- see note below per RN:     Breanna Roberts, RN   Registered Nurse      Progress Notes      Signed   Date of Service:  2/24/2023  1:31 PM           Signed        Received patient from [place] [address] [place] showed no fluid. No procedure done. Patient ready to leave. Update History & Physical     The patient's History and Physical of 2-3-23 was reviewed with the patient. I personally examined the patient, I concur with above findings, there was no change EXCEPT:  Patient did follow up with Dr. Genie Florentino on 2-21-21- see chart for further detail. Patient presented for paracentesis on 2-10-23, but was not done due to lack of fluid. PRE-PARACENTESIS QUESTIONNAIRE:   Last paracentesis date: 2-3-23. Amount removed: 2.4 L. Tolerated well: Yes. Any complications: Denies. Albumin given: Denies. Interval between paracentesis dates: Every 2 weeks. GI HX: See chart. Medications related to presenting condition: See chart. Taking as prescribed: States compliant. Shortness of breath: Denies. ABD distention: Denies. ABD pain: Denies. Nausea: Denies. Vomiting: Denies. Constipation/diarrhea: Denies. Fever/chills: Denies. Recent unintended weight gain: Denies. Leg swelling: Improved- b/l. Jaundice/scleral icterus: Denies. Physical exam was completed today and unchanged from source note except:  Scabbing/scaring noted to RUE. Localized scaring noted to RLE.  +1 pitting edema to b/l LE's, no erythema/no warmth. Patient states hx of hiatal hernia- not palpated on exam, umbilical hernia is not obvious on today's exam.  ABD assessment reveals a soft/non-rigid ABD, no obvious ascites, non-tender on exam.  LUE exhibits mild swelling- no erythema, no warmth- normal hand assessment, sensation intact to light touch, normal radial pulses b/l, normal capillary refill <3 seconds b/l.   + scattered bruising.   Heart rhythm and rate remains regular and normal, heart murmur remains, all lung fields CTA as noted per source H&P. Patient does not appear SOB today. NPO status: Patient ate/drank this morning. Medications taken TODAY (w/sip of water): None. Blood thinners: None. Personal or family hx of complications w/anesthesia: PONV w/patient- states just nausea. Nicotine status: Former. Denies personal hx of MRSA. Denies current CP, palpitations, dizziness, SOB, URI s/s, GI issues except what is noted above/source note, fever/chills. Review current vital signs per RN flow sheet.   /78   Pulse (!) 110   Temp 97.1 °F (36.2 °C) (Infrared)   Resp 18   SpO2 98%     Most recent lab work reviewed:  Lab Results   Component Value Date     (L) 01/27/2023    K 3.9 01/27/2023    CL 99 01/27/2023    CO2 26 01/27/2023    BUN 10 01/27/2023    CREATININE 0.67 (L) 01/27/2023    GLUCOSE 137 (H) 01/27/2023    CALCIUM 8.0 (L) 01/27/2023    PROT 5.0 (L) 01/24/2023    LABALBU 2.4 (L) 01/24/2023    BILITOT 1.5 (H) 01/24/2023    ALKPHOS 197 (H) 01/24/2023    AST 69 (H) 01/24/2023    ALT 25 01/24/2023    LABGLOM >60 01/27/2023    GFRAA >60 09/28/2022    GLOB NOT REPORTED 08/19/2021       Lab Results   Component Value Date    WBC 3.1 (L) 02/21/2023    HGB 8.1 (L) 02/21/2023    HCT 26.8 (L) 02/21/2023    MCV 79.6 (L) 02/21/2023     (L) 02/24/2023     Lab Results   Component Value Date     (L) 02/24/2023     Lab Results   Component Value Date    INR 1.4 02/24/2023    INR 1.4 01/24/2023    INR 1.6 01/03/2023    PROTIME 17.1 (H) 02/24/2023    PROTIME 17.0 (H) 01/24/2023    PROTIME 16.0 (H) 01/03/2023     Lab Results   Component Value Date    APTT 33.2 02/24/2023      Past Medical History:   Diagnosis Date    Abnormal EKG     Acute deep vein thrombosis (DVT) of brachial vein of right upper extremity ([facility] 01/07/2023    Also- Superficial venous thrombosis of the cephalic vein in the forearm    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)     Alcoholic cirrhosis of liver with ascites ([facility] [facility]     Anemia 04/13/2022 Anxiety     Arthritis     Back pain, chronic     dr. Silvano Davies, orthopedic, every 3-4 months, gets steroid injection    Lezama esophagus     Bleeding gastric varices 12/29/2022    BPH (benign prostatic hypertrophy)     Cholelithiasis     Cirrhosis ([address]     COVID-19 12/2020    pt reports he had a positive test while at [facility] in 2020, was asymptomatic    COVID-19 vaccine series completed 5/20/2021, 6/22/2021    Moderna 5/20/2021, 6/22/2021    DDD (degenerative disc disease), lumbar     Depression     Esophageal cancer ([address]     INVASIVE ADENOCARCINOMA ARISING IN TUBULAR ADENOMA WITH HIGH GRADE DYSPLASIA, ASSOCIATED WITH FOCAL INTESTINAL METAPLASIA     Esophageal varices ([address]     Fatty liver     GERD (gastroesophageal reflux disease)     GI bleed     Heart murmur     Hep C w/o coma, chronic ([address]     Hiatal hernia     History of alcohol abuse     6-12 beers a day; quit drinking 2019    History of blood transfusion     History of colon polyps 2016    History of tobacco abuse     [place] (hard of hearing)     Hyperlipidemia     Hypertension     Hyponatremia 07/20/2016    Hypotension 12/20/2021    Mastoid disorder, bilateral 12/31/2022    biLateral mastoid effusion    Pericardial effusion     PONV (postoperative nausea and vomiting)     Poor venous access     has port in place.     Port-A-Cath in place     right upper chest    Portal hypertension ([address]     Sciatica     Secondary esophageal varices ([address] 06/07/2022    Shortness of breath     Spinal stenosis     Stomach ulcer     hx of    Thrombocytopenia ([address] 12/23/2020    Tubular adenoma of colon 2016, 2018    Vitamin D deficiency     Wears glasses        Past Surgical History:   Procedure Laterality Date    BUNIONECTOMY      twice on right side    BUNIONECTOMY Left     CARPAL TUNNEL RELEASE Right     COLONOSCOPY      at age 35    COLONOSCOPY  10/05/2016    polyps-pathology tubular adenoma, and abnormal looking mucosa right colon-pathology-tubular adenoma COLONOSCOPY N/A 03/30/2018    COLONOSCOPY POLYPECTOMY COLD BIOPSY performed by Romel Murray MD at [place] [address]  03/30/2018    Small polyp in the sigmoid colon and excised with biopsy forceps--tubular adenoma    COLONOSCOPY N/A 04/16/2022    COLONOSCOPY POLYPECTOMY performed by Romel Murray MD at [facility], COLON, DIAGNOSTIC      EGD    ESOPHAGOGASTRODUODENOSCOPY  12/29/2022    ESOPHAGOGASTRODUODENOSCOPY N/A 01/03/2023    IR PORT PLACEMENT EQUAL OR GREATER THAN 5 YEARS  04/19/2021    IR PORT PLACEMENT EQUAL OR GREATER THAN 5 YEARS 4/19/2021 [facility] SPECIAL PROCEDURES    IR TIPS INSERTION  12/01/2022    IR TIPS INSERTION 12/1/2022 Breanne Hernández MD [facility] SPECIAL PROCEDURES    KNEE SURGERY Left     cyst removed    NASAL SEPTUM SURGERY      NERVE BLOCK Right 11/23/2020    NERVE BLOCK RIGHT CERVICAL STEROID INJECTION  C3-C6 performed by Leah Godoy MD at [address]  01/04/2016    steroid injection C7 T1    OTHER SURGICAL HISTORY  11/21/2016    Bilateral Lumbar CACHORRO L4-L5 injections    OTHER SURGICAL HISTORY  12/19/2016    lumbar steroid injection    OTHER SURGICAL HISTORY  09/28/2018    BILATERAL L5 CACHORRO (N/A Back)    OTHER SURGICAL HISTORY Right 11/23/2020    cervical injection    PAIN MANAGEMENT PROCEDURE Left 07/09/2020    EPIDURAL STEROID INJECTION LEFT L4 L5 performed by Leah Godoy MD at [facility] Left 07/20/2020    LEFT L4 L5 EPIDURAL STEROID INJECTION performed by Leah Godoy MD at [facility] Bilateral 08/17/2020    LUMBAR FACET BILATERAL L2-L5 performed by Leah Godoy MD at [facility] Bilateral 12/07/2020    NERVE BLOCK BILATERAL LUMBAR MEDIAL BRANCH L2-L5 performed by Leah Godoy MD at [address] NEUROPLASTY &/TRANSPOS MEDIAN NRV CARPAL [place] Right 08/29/2017    CARPAL TUNNEL RELEASE RIGHT performed by Korey Scherer MD at [facility] OR    MO NEUROPLASTY &/TRANSPOS MEDIAN NRV CARPAL TUNNE Left 10/31/2017    CARPAL TUNNEL RELEASE performed by Kirill García MD at [place] [address] AA&/STRD TFRML EPI LUMBAR/SACRAL 1 LEVEL Bilateral 09/06/2018    BILATERAL L5 CACHORRO performed by Kamron Madsen MD at [facility] [address] AA&/STRD TFRML EPI LUMBAR/SACRAL 1 LEVEL N/A 09/28/2018    BILATERAL L5 CACHORRO performed by Kamron Madsen MD at [address] [place] [address] 12/29/2020    EGD BIOPSY performed by Agusto Mckinley MD at [address] [place] [address] 02/02/2021    EGD BIOPSY and spot marking performed by Santa Mckeon MD at [address] [place] [address] 02/12/2021    ENDOSCOPIC ULTRASOUND, EGD performed by Rene Navarro MD at [address] [place] [address]  02/12/2021    EGD DIAGNOSTIC ONLY performed by Rene Navarro MD at [address] [place] [address] N/A 08/31/2021    EGD BIOPSY performed by Santa Mckeon MD at [address] [place] [address] 01/21/2022    EGD BIOPSY performed by Santa Mckeon MD at [place] [address] N/A 04/15/2022    EGD ESOPHAGOGASTRODUODENOSCOPY performed by Agusto Mckinley MD at [address] [place] [address] 06/06/2022    EGD BAND LIGATION performed by Maria A Valle MD at [place] [address] N/A 06/09/2022    EGD ESOPHAGOGASTRODUODENOSCOPY performed by Maria A Valle MD at [place] [address] N/A 09/14/2022    EGD ESOPHAGOGASTRODUODENOSCOPY ENDOSCOPIC APC AT GE JUNCTION performed by Arnaldo Gowers, MD at [address] [place] [address] 10/19/2022    EGD BIOPSY performed by Santa Mckeon MD at [address] [place] [address] 10/31/2022    EGD with APC performed by Santa Mckeon MD at [place] [address] 12/29/2022    EGD ESOPHAGOGASTRODUODENOSCOPY performed by Aure Lemon MD at [address] [place] [address] 01/03/2023    EGD ESOPHAGOGASTRODUODENOSCOPY performed by Mal Ortiz MD at [address] [place] [address] N/A 01/26/2023    EGD CONTROL HEMORRHAGE performed by Ezra Rajan MD at [address] [place] [address] N/A 2/13/2023    EGD WITH APC GOLD PROBE performed by Ezra Rajan MD at [address] [place] [address]         Patient Active Problem List   Diagnosis    Back pain, chronic    Hearing difficulty    GERD (gastroesophageal reflux disease)    Cervical radicular pain    Hypomagnesemia    Chronic viral hepatitis B without delta agent and without coma (HCC)    Calculus of gallbladder without cholecystitis    Hep C w/o coma, chronic (HCC)    Fatty liver    Psychophysiologic insomnia    Cirrhosis ([facility]    Decompensation of cirrhosis of liver (HCC)    Vertebrogenic low back pain    DDD (degenerative disc disease), lumbar    Depression    Tubular adenoma of colon    History of colon polyps    Gynecomastia, male    Lumbar radiculitis    Lumbar disc herniation    Tinnitus    Eustachian tube dysfunction    Ganglion cyst    Carpal tunnel syndrome of right wrist    History of hepatitis C    Vitamin D deficiency    Pure hypercholesterolemia    Acute hypokalemia    Essential hypertension    Recurrent major depressive disorder in partial remission (HCC)    S/P epidural steroid injection    Elevated LFTs    Seasonal allergies    Lumbar facet arthropathy    Cervical facet syndrome    Thrombocytopenia (HCC)    Hepatitis C virus infection resolved after antiviral drug therapy    Alcohol abuse    Altered mental status    Hypocalcemia    Hypophosphatemia    Malignant neoplasm of lower third of esophagus (HCC)    COVID-19    Anxiety    Current smoker    Severe comorbid illness    Gait instability    Abnormal findings on diagnostic imaging of spine Cervical spinal stenosis    Spinal stenosis of lumbar region with neurogenic claudication    Low hemoglobin    Symptomatic anemia, microcytic, acute    Hypotension    Former smoker, 50+ pack years, quit 2016    HLD (hyperlipidemia)    Esophageal adenocarcinoma ([facility]    Anemia    Acute kidney injury ([facility] [facility]    Ascites due to alcoholic cirrhosis (HCC)    Shortness of breath    Drop in hemoglobin    Esophageal polyp    Acute kidney failure, unspecified (HCC)    Muscle weakness (generalized)    Other abnormalities of gait and mobility    GI bleed    Goals of care, counseling/discussion    ACP (advance care planning)    Palliative care encounter    S/P TIPS (transjugular intrahepatic portosystemic shunt)    Lezama's esophagus with dysplasia    Mastoid disorder, bilateral    Acute deep vein thrombosis (DVT) of brachial vein of right upper extremity (HCC)    Chronic hepatitis C without hepatic coma (HCC)    Swelling of joint of upper arm, right    Anasarca       Procedure site was confirmed per myself and the patient.   Electronically signed by MASSIMO Kaye CNP on 2/24/2023 at 12:53 PM

## 2023-02-24 NOTE — PROGRESS NOTES
Received patient from [place] [address] [place] showed no fluid. No procedure done. Patient ready to leave.

## 2023-03-01 ENCOUNTER — CARE COORDINATION (OUTPATIENT)
Dept: CASE MANAGEMENT | Age: 63
End: 2023-03-01

## 2023-03-01 NOTE — CARE COORDINATION
Care Transitions Outreach Attempt    Call within 2 business days of discharge: Yes   Attempted to reach patient for transitions of care follow up. Unable to reach patient. Patient: Shavonne Keys Patient : 1960 MRN: [id number]    Last Discharge  [address]       Date Complaint Diagnosis Description Type Department Provider    23   Admission (Discharged) Wagner Landin MD          Attempted to reach patient for subsequent call. Left [employer] appropriate message with contact information requesting return call to [phone]   2 unsuccessful attempts to reach patient, final call completed, care transitions episode resolved//JU  Was this an external facility discharge?  No Discharge Facility: [facility]    Noted following upcoming appointments from discharge chart review:   [address] follow up appointment(s):   Future Appointments   Date Time Provider Carolyn English   3/3/2023 10:00 AM MASSIMO Dorman NP [facility]   3/3/2023  1:00 PM [facility]  [facility]   3/10/2023  1:00 PM [facility]   3/17/2023  1:00 PM [facility]  [facility]   3/24/2023  1:00 PM [facility]  [facility]   3/31/2023  1:00 PM [facility] IR  [facility]   2023  1:00 PM [facility]  [facility]   2023  1:00 PM [facility]  [facility]   2023  1:00 PM [facility]  [facility]   2023  1:00 PM [facility]  [facility]   2023  1:00 PM [facility]  [facility]   2023  1:00 PM [facility]  [facility]   2023  1:00 PM [facility]   2023  1:15 PM STC CT RM 1 [facility] CT SCAN [facility]   2023  1:00 PM [facility]  [facility]   2023 11:00 AM Wyatt Galvin MD [facility]   2023  1:00 PM [facility] IR  [facility] SPECIAL [facility] Radiolog   2023 1:00 PM [facility]  [facility]   6/16/2023  1:00 PM [facility]  [facility]   6/23/2023  1:00 PM [facility]  [facility] Radiolog     Non-[facility] follow up appointment(s):

## 2023-03-03 ENCOUNTER — OFFICE VISIT (OUTPATIENT)
Dept: GASTROENTEROLOGY | Age: 63
End: 2023-03-03
Payer: MEDICARE

## 2023-03-03 VITALS
HEIGHT: 70 IN | DIASTOLIC BLOOD PRESSURE: 80 MMHG | BODY MASS INDEX: 28.63 KG/M2 | WEIGHT: 200 LBS | SYSTOLIC BLOOD PRESSURE: 138 MMHG

## 2023-03-03 DIAGNOSIS — K76.6 PORTAL HYPERTENSION (HCC): ICD-10-CM

## 2023-03-03 DIAGNOSIS — K22.711 BARRETT'S ESOPHAGUS WITH HIGH GRADE DYSPLASIA: ICD-10-CM

## 2023-03-03 DIAGNOSIS — K31.89 PORTAL HYPERTENSIVE GASTROPATHY (HCC): ICD-10-CM

## 2023-03-03 DIAGNOSIS — K74.69 DECOMPENSATED LIVER DISEASE (HCC): ICD-10-CM

## 2023-03-03 DIAGNOSIS — K76.6 PORTAL HYPERTENSIVE GASTROPATHY (HCC): ICD-10-CM

## 2023-03-03 DIAGNOSIS — K70.31 ALCOHOLIC CIRRHOSIS OF LIVER WITH ASCITES (HCC): Primary | ICD-10-CM

## 2023-03-03 PROCEDURE — 99214 OFFICE O/P EST MOD 30 MIN: CPT | Performed by: NURSE PRACTITIONER

## 2023-03-03 PROCEDURE — 3075F SYST BP GE 130 - 139MM HG: CPT | Performed by: NURSE PRACTITIONER

## 2023-03-03 PROCEDURE — 3079F DIAST BP 80-89 MM HG: CPT | Performed by: NURSE PRACTITIONER

## 2023-03-03 ASSESSMENT — ENCOUNTER SYMPTOMS
SHORTNESS OF BREATH: 1
ABDOMINAL DISTENTION: 1
ALLERGIC/IMMUNOLOGIC NEGATIVE: 1
BACK PAIN: 1

## 2023-03-03 NOTE — PROGRESS NOTES
GI CLINIC FOLLOW UP    INTERVAL HISTORY:   No referring provider defined for this encounter. Chief Complaint   Patient presents with    Cirrhosis     Pt here for egd f/u        HISTORY OF PRESENT ILLNESS:     Patient being seen for follow-up EGD. Patient had APC cauterization of telangiectasia like lesions in the lower esophagus/hiatal hernia. At present he is doing well. Denies any melena, hematochezia. His last hemoglobin was 8.1. Clinically he has been doing well. His last paracentesis was actually in January 2023. He was previously getting weekly paracentesis. In the office today his abdomen is soft. No bilateral lower extremity edema. No signs of encephalopathy. Patient reports good appetite. No nausea, vomiting. He does continue to drink alcohol 2-3 drinks a day. States he is following a 2 g sodium diet    Currently on Lasix and Aldactone      Past Medical,Family, and Social History reviewed and does contribute to the patient presentingcondition. Patient's PMH/PSH,SH,PSYCH Hx, MEDs, ALLERGIES, and ROS were all reviewed and updated in the appropriate sections.     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 Edgardo Robin, orthopedic, every 3-4 months, gets steroid injection    Lezama esophagus     Bleeding gastric varices 12/29/2022    BPH (benign prostatic hypertrophy)     Cholelithiasis     Cirrhosis ([address]     COVID-19 12/2020    pt reports he had a positive test while at [facility] in 2020, was asymptomatic    COVID-19 vaccine series completed 5/20/2021, 6/22/2021    Moderna 5/20/2021, 6/22/2021    DDD (degenerative disc disease), lumbar     Depression     Esophageal cancer ([address]     INVASIVE ADENOCARCINOMA ARISING IN TUBULAR ADENOMA WITH HIGH GRADE DYSPLASIA, ASSOCIATED WITH FOCAL INTESTINAL METAPLASIA     Esophageal varices (HCC)     Fatty liver     GERD (gastroesophageal reflux disease)     GI bleed     Heart murmur     Hep C w/o coma, chronic ([address]     Hiatal hernia     History of alcohol abuse     6-12 beers a day; quit drinking 2019    History of blood transfusion     History of colon polyps 2016    History of tobacco abuse     [place] (hard of hearing)     Hyperlipidemia     Hypertension     Hyponatremia 07/20/2016    Hypotension 12/20/2021    Mastoid disorder, bilateral 12/31/2022    biLateral mastoid effusion    Pericardial effusion     PONV (postoperative nausea and vomiting)     Poor venous access     has port in place.     Port-A-Cath in place     right upper chest    Portal hypertension ([address]     Sciatica     Secondary esophageal varices ([address] 06/07/2022    Shortness of breath     Spinal stenosis     Stomach ulcer     hx of    Thrombocytopenia ([address] 12/23/2020    Tubular adenoma of colon 2016, 2018    Vitamin D deficiency     Wears glasses        Past Surgical History:   Procedure Laterality Date    BUNIONECTOMY      twice on right side    BUNIONECTOMY Left     CARPAL TUNNEL RELEASE Right     COLONOSCOPY      at age 35    COLONOSCOPY  10/05/2016    polyps-pathology tubular adenoma, and abnormal looking mucosa right colon-pathology-tubular adenoma    COLONOSCOPY N/A 03/30/2018    COLONOSCOPY POLYPECTOMY COLD BIOPSY performed by Brian Yo MD at [place] [address]  03/30/2018    Small polyp in the sigmoid colon and excised with biopsy forceps--tubular adenoma    COLONOSCOPY N/A 04/16/2022    COLONOSCOPY POLYPECTOMY performed by Brian Yo MD at [facility], DIAGNOSTIC      EGD    ESOPHAGOGASTRODUODENOSCOPY  12/29/2022    ESOPHAGOGASTRODUODENOSCOPY N/A 01/03/2023    IR PORT PLACEMENT EQUAL OR GREATER THAN 5 YEARS  04/19/2021    IR PORT PLACEMENT EQUAL OR GREATER THAN 5 YEARS 4/19/2021 [facility]    IR TIPS INSERTION  12/01/2022    IR TIPS INSERTION 12/1/2022 Tanika Rose MD [facility]    KNEE SURGERY Left     cyst removed    NASAL SEPTUM SURGERY      NERVE BLOCK Right 11/23/2020    NERVE BLOCK RIGHT CERVICAL STEROID INJECTION  C3-C6 performed by Philip Ortiz MD at [address]  01/04/2016    steroid injection C7 T1    OTHER SURGICAL HISTORY  11/21/2016    Bilateral Lumbar CACHORRO L4-L5 injections    OTHER SURGICAL HISTORY  12/19/2016    lumbar steroid injection    OTHER SURGICAL HISTORY  09/28/2018    BILATERAL L5 CACHORRO (N/A Back)    OTHER SURGICAL HISTORY Right 11/23/2020    cervical injection    PAIN MANAGEMENT PROCEDURE Left 07/09/2020    EPIDURAL STEROID INJECTION LEFT L4 L5 performed by Philip Ortiz MD at [facility] Left 07/20/2020    LEFT L4 L5 EPIDURAL STEROID INJECTION performed by Philip Ortiz MD at [facility] Bilateral 08/17/2020    LUMBAR FACET BILATERAL L2-L5 performed by Philip Ortiz MD at [facility] Bilateral 12/07/2020    NERVE BLOCK BILATERAL LUMBAR MEDIAL BRANCH L2-L5 performed by Philip Ortiz MD at [address] [facility]    [place] NEUROPLASTY &/TRANSPOS MEDIAN NRV CARPAL Jhon Boatman Right 08/29/2017    CARPAL TUNNEL RELEASE RIGHT performed by Abby Joya MD at [address] &/TRANSPOS MEDIAN NRV CARPAL TUNNE Left 10/31/2017    CARPAL TUNNEL RELEASE performed by Abby Joya MD at [address] AA&/STRD TFRML EPI LUMBAR/SACRAL 1 LEVEL Bilateral 09/06/2018    BILATERAL L5 CACHORRO performed by Philip Ortiz MD at [address] AA&/STRD TFRML EPI LUMBAR/SACRAL 1 LEVEL N/A 09/28/2018    BILATERAL L5 CACHORRO performed by Philip Ortiz MD at [address] [facility] 12/29/2020    EGD BIOPSY performed by Paulo Lew MD at [address] [place] [address] 02/02/2021    EGD BIOPSY and spot marking performed by Nathen Ledbetter MD at [address] [place] [address] 02/12/2021    ENDOSCOPIC ULTRASOUND, EGD performed by Christy Dennis MD at [facility] 1  02/12/2021    EGD DIAGNOSTIC ONLY performed by Christy Dennis MD at [facility] 1 08/31/2021    EGD BIOPSY performed by Nathen Ledbetter MD at [address] [place] [address] 01/21/2022    EGD BIOPSY performed by Nathen Ledbetter MD at [address] [place] [address] N/A 04/15/2022    EGD ESOPHAGOGASTRODUODENOSCOPY performed by Paulo Lew MD at [address] [place] [address] 06/06/2022    EGD BAND LIGATION performed by Sandeep Leon MD at [address] [place] [address] N/A 06/09/2022    EGD ESOPHAGOGASTRODUODENOSCOPY performed by Sandeep Leon MD at [address] [place] [address] N/A 09/14/2022    EGD ESOPHAGOGASTRODUODENOSCOPY ENDOSCOPIC APC AT GE JUNCTION performed by Lin Rich MD at [address] [place] [address] 10/19/2022    EGD BIOPSY performed by Nathen Ledbetter MD at [address] [place] [address] 10/31/2022    EGD with APC performed by Nathen Ledbetter MD at [address] [place] [address]  12/29/2022    EGD ESOPHAGOGASTRODUODENOSCOPY performed by Christy Dennis MD at [address] [place] [address] 01/03/2023    EGD ESOPHAGOGASTRODUODENOSCOPY performed by Oleg Sim MD at [address] [place] [address] 01/26/2023    EGD CONTROL HEMORRHAGE performed by Nathen Ledbetter MD at [address] [place] [address] 2/13/2023    EGD WITH APC GOLD PROBE performed by Nathen Ledbetter MD at [facility] ENDO    WISDOM TOOTH EXTRACTION         CURRENT MEDICATIONS:    Current Outpatient Medications:     lactulose (CHRONULAC) 10 GM/15ML solution, take 30 milliliters by mouth three times a day, Disp: 473 mL, Rfl: 1    spironolactone (ALDACTONE) 25 MG tablet, Take 2 tablets by mouth daily, Disp: 30 tablet, Rfl: 3    sucralfate (CARAFATE) 1 GM tablet, Take 1 tablet by mouth 4 times daily, Disp: 120 tablet, Rfl: 3    furosemide (LASIX) 40 MG tablet, Take 1 tablet by mouth in the morning and 1 tablet in the evening., Disp: 60 tablet, Rfl: 3    pantoprazole (PROTONIX) 40 MG tablet, Take 40 mg by mouth daily, Disp: , Rfl:     FEROSUL 325 (65 Fe) MG tablet, take 1 tablet by mouth twice a day, Disp: 60 tablet, Rfl: 5    atorvastatin (LIPITOR) 20 MG tablet, Take 1 tablet by mouth nightly, Disp: 30 tablet, Rfl: 3    ALLERGIES:   No Known Allergies    FAMILY HISTORY:       Problem Relation Age of Onset    Cancer Mother         pancreatic    Cancer Father         bone    Diabetes Sister     Asthma Brother     Allergies Brother         MULTIPLE         SOCIAL HISTORY:   Social History     Socioeconomic History    Marital status: Single     Spouse name: Not on file    Number of children: Not on file    Years of education: Not on file    Highest education level: Not on file   Occupational History    Not on file   Tobacco Use    Smoking status: Former     Packs/day: 1.00     Years: 45.00     Pack years: 45.00     Types: Cigarettes     Quit date: 2017     Years since quittin.1    Smokeless tobacco: Never   Vaping Use    Vaping Use: Never used   Substance and Sexual Activity    Alcohol use: Not Currently     Comment: Quit alcohol in 2019- heavier drinking prior to quitting    Drug use: Not Currently     Frequency: 1.0 times per week     Types: Cocaine     Comment: Cocaine- stopped spring 2016    Sexual activity: Yes     Partners: Female   Other Topics Concern    Not on file   Social History Narrative     in the past, retired     Social Determinants of Health     Financial Resource Strain: Low Risk     Difficulty of Paying Living Expenses: Not hard at all   Food Insecurity: No Food Insecurity    Worried About [facility] in the Last Year: Never true    Ran Out of Food in the Last Year: Never true   Transportation Needs: Not on file   Physical Activity: Inactive    Days of Exercise per Week: 0 days    Minutes of Exercise per Session: 0 min   Stress: Not on file   Social Connections: Not on file   Intimate Partner Violence: Not on file   Housing Stability: Not on file       REVIEW OF SYSTEMS: A 12-point review of systemswas obtained and pertinent positives and negatives were enumerated above in the history of present illness. All other reviewed systems / symptoms were negative. Review of Systems   Constitutional: Negative. HENT: Negative. Eyes:  Positive for visual disturbance (glasses). Respiratory:  Positive for shortness of breath. Cardiovascular: Negative. Gastrointestinal:  Positive for abdominal distention. Endocrine: Negative. Genitourinary: Negative. Musculoskeletal:  Positive for back pain. Skin: Negative. Allergic/Immunologic: Negative. Neurological: Negative. Hematological:  Bruises/bleeds easily. Psychiatric/Behavioral: Negative. PHYSICAL EXAMINATION: Vital signs reviewed per the nursing documentation. Ht 5' 10\" (1.778 m)   BMI 29.31 kg/m²   Body mass index is 29.31 kg/m². Physical Exam  Constitutional:       Appearance: Normal appearance. Eyes:      General: No scleral icterus. Pupils: Pupils are equal, round, and reactive to light. Cardiovascular:      Rate and Rhythm: Normal rate and regular rhythm. Heart sounds: Normal heart sounds. Pulmonary:      Effort: Pulmonary effort is normal.      Breath sounds: Normal breath sounds. Abdominal:      General: Bowel sounds are normal. There is no distension. Palpations: Abdomen is soft.  There is no mass. Tenderness: There is no abdominal tenderness. There is no guarding. Skin:     General: Skin is warm and dry. Coloration: Skin is not jaundiced. Neurological:      Mental Status: He is alert and oriented to person, place, and time. Mental status is at baseline. LABORATORY DATA: Reviewed  Lab Results   Component Value Date    WBC 3.1 (L) 02/21/2023    HGB 8.1 (L) 02/21/2023    HCT 26.8 (L) 02/21/2023    MCV 79.6 (L) 02/21/2023     (L) 02/24/2023     (L) 01/27/2023    K 3.9 01/27/2023    CL 99 01/27/2023    CO2 26 01/27/2023    BUN 10 01/27/2023    CREATININE 0.67 (L) 01/27/2023    LABPROT 7.7 04/19/2012    LABALBU 2.4 (L) 01/24/2023    BILITOT 1.5 (H) 01/24/2023    ALKPHOS 197 (H) 01/24/2023    AST 69 (H) 01/24/2023    ALT 25 01/24/2023    INR 1.4 02/24/2023         Lab Results   Component Value Date    RBC 3.37 (L) 02/21/2023    HGB 8.1 (L) 02/21/2023    MCV 79.6 (L) 02/21/2023    MCH 24.1 (L) 02/21/2023    MCHC 30.3 (L) 02/21/2023    RDW 18.3 (H) 02/21/2023    MPV 6.6 02/21/2023    BASOPCT 1 02/21/2023    LYMPHSABS 0.50 (L) 02/21/2023    MONOSABS 0.60 02/21/2023    NEUTROABS 1.90 02/21/2023    EOSABS 0.10 02/21/2023    BASOSABS 0.00 02/21/2023         DIAGNOSTIC TESTING:     IR US GUIDED PARACENTESIS    Result Date: 2/24/2023  EXAMINATION: Limited abdominal sonogram to assess for ascites 2/24/2023 1:20 pm COMPARISON: February 10, 2023 HISTORY: ORDERING SYSTEM PROVIDED HISTORY: Alcoholic cirrhosis of liver with ascites ([address] FINDINGS: Limited sonogram over the abdomen demonstrates no abdominal fluid/ascites. No abdominal ascites     IR US GUIDED PARACENTESIS    Result Date: 2/10/2023  EXAMINATION: Limited abdominal sonogram 2/10/2023 12:50 pm COMPARISON: January 25, 2023 HISTORY: ORDERING SYSTEM PROVIDED HISTORY: Ascites due to alcoholic cirrhosis [facility]) FINDINGS: Limited sonogram of the abdomen demonstrates no significant ascites. Patient declined paracentesis.      No significant abdominal ascites. IR US GUIDED PARACENTESIS    Result Date: 2/3/2023  PROCEDURE: PARACENTESIS WITH IMAGE GUIDANCE US ABDOMEN LIMITED 2/3/2023 HISTORY: ORDERING SYSTEM PROVIDED HISTORY: Alcoholic cirrhosis of liver with ascites ([address] TECHNOLOGIST PROVIDED HISTORY: Weekly due o amount drained every 2 weeks TECHNIQUE: Informed consent was obtained after a detailed explanation of the procedure including risks, benefits, and alternatives. Universal protocol was followed. A limited ultrasound of the abdomen was performed. The right abdomen was prepped and draped in sterile fashion and local anesthesia was achieved with lidocaine. An 5 Micronesian needle sheath was advanced into ascites and paracentesis was performed. The patient tolerated the procedure well. FINDINGS: Limited ultrasound of the abdomen demonstrates ascites. A total of 2.4 L was removed. Successful ultrasound-guided therapeutic paracentesis. IMPRESSION: Mr. Ashanti Correa is a 60 y.o. male with    Diagnosis Orders   1. Alcoholic cirrhosis of liver with ascites (HCC)  CBC with Auto Differential    Comprehensive Metabolic Panel      2. Decompensated liver disease ([address]        3. Portal hypertension ([address]        4. Portal hypertensive gastropathy (HCC)        5. Lezama's esophagus with high grade dysplasia          At present patient is stable. He is advised to have CBC, CMP today. Standing order placed for CBC, CMP. We will cancel his paracentesis ordered for today. Continue to follow 2 g sodium diet  Continue diuretics as ordered  Monitor for any bleeding  Return to office 2 weeks sooner if needed      Thank you for allowing me to participate in the care of Mr. Ashanti Correa. For any further questions please do not hesitate to contact me. I have reviewed and agree with the ROS entered by the MA/TODD.          ANNA Graham    [facility]  Office #: [phone]

## 2023-03-07 ENCOUNTER — HOSPITAL ENCOUNTER (OUTPATIENT)
Age: 63
Discharge: HOME | End: 2023-03-07
Payer: MEDICARE

## 2023-03-07 DIAGNOSIS — K70.31 ALCOHOLIC CIRRHOSIS OF LIVER WITH ASCITES (HCC): ICD-10-CM

## 2023-03-07 LAB
ABSOLUTE EOS #: 0 K/UL (ref 0–0.4)
ABSOLUTE LYMPH #: 0.93 K/UL (ref 1–4.8)
ABSOLUTE MONO #: 0.42 K/UL (ref 0.1–1.3)
ALBUMIN SERPL-MCNC: 3.1 G/DL (ref 3.5–5.2)
ALP SERPL-CCNC: 175 U/L (ref 40–129)
ALT SERPL-CCNC: 10 U/L (ref 5–41)
ANION GAP SERPL CALCULATED.3IONS-SCNC: 7 MMOL/L (ref 9–17)
AST SERPL-CCNC: 40 U/L
BASOPHILS # BLD: 0 % (ref 0–2)
BASOPHILS ABSOLUTE: 0 K/UL (ref 0–0.2)
BILIRUB SERPL-MCNC: 1.4 MG/DL (ref 0.3–1.2)
BUN SERPL-MCNC: 7 MG/DL (ref 8–23)
CALCIUM SERPL-MCNC: 8.3 MG/DL (ref 8.6–10.4)
CHLORIDE SERPL-SCNC: 106 MMOL/L (ref 98–107)
CO2 SERPL-SCNC: 30 MMOL/L (ref 20–31)
CREAT SERPL-MCNC: 0.48 MG/DL (ref 0.7–1.2)
EOSINOPHILS RELATIVE PERCENT: 0 % (ref 0–4)
GFR SERPL CREATININE-BSD FRML MDRD: >60 ML/MIN/1.73M2
GLUCOSE SERPL-MCNC: 118 MG/DL (ref 70–99)
HCT VFR BLD AUTO: 28.3 % (ref 41–53)
HGB BLD-MCNC: 8.5 G/DL (ref 13.5–17.5)
LYMPHOCYTES # BLD: 31 % (ref 24–44)
MCH RBC QN AUTO: 22.3 PG (ref 26–34)
MCHC RBC AUTO-ENTMCNC: 30.2 G/DL (ref 31–37)
MCV RBC AUTO: 73.8 FL (ref 80–100)
MONOCYTES # BLD: 14 % (ref 1–7)
MORPHOLOGY: ABNORMAL
PDW BLD-RTO: 17.9 % (ref 11.5–14.9)
PLATELET # BLD AUTO: 123 K/UL (ref 150–450)
PMV BLD AUTO: 7.3 FL (ref 6–12)
POTASSIUM SERPL-SCNC: 3.8 MMOL/L (ref 3.7–5.3)
PROT SERPL-MCNC: 6.7 G/DL (ref 6.4–8.3)
RBC # BLD: 3.83 M/UL (ref 4.5–5.9)
SEG NEUTROPHILS: 55 % (ref 36–66)
SEGMENTED NEUTROPHILS ABSOLUTE COUNT: 1.65 K/UL (ref 1.3–9.1)
SODIUM SERPL-SCNC: 143 MMOL/L (ref 135–144)
WBC # BLD AUTO: 3 K/UL (ref 3.5–11)

## 2023-03-07 PROCEDURE — 36415 COLL VENOUS BLD VENIPUNCTURE: CPT

## 2023-03-07 PROCEDURE — 80053 COMPREHEN METABOLIC PANEL: CPT

## 2023-03-07 PROCEDURE — 85025 COMPLETE CBC W/AUTO DIFF WBC: CPT

## 2023-03-16 ENCOUNTER — APPOINTMENT (OUTPATIENT)
Dept: GENERAL RADIOLOGY | Age: 63
DRG: 432 | End: 2023-03-16
Payer: COMMERCIAL

## 2023-03-16 ENCOUNTER — HOSPITAL ENCOUNTER (INPATIENT)
Age: 63
LOS: 9 days | Discharge: ANOTHER HOSPITAL | DRG: 432 | End: 2023-03-25
Attending: EMERGENCY MEDICINE | Admitting: INTERNAL MEDICINE
Payer: COMMERCIAL

## 2023-03-16 DIAGNOSIS — R42 LIGHTHEADEDNESS: Primary | ICD-10-CM

## 2023-03-16 DIAGNOSIS — K92.2 GASTROINTESTINAL HEMORRHAGE, UNSPECIFIED GASTROINTESTINAL HEMORRHAGE TYPE: ICD-10-CM

## 2023-03-16 LAB
ABSOLUTE BANDS #: 0.08 K/UL (ref 0–1)
ABSOLUTE EOS #: 0.04 K/UL (ref 0–0.4)
ABSOLUTE LYMPH #: 0.32 K/UL (ref 1–4.8)
ABSOLUTE MONO #: 0.8 K/UL (ref 0.1–1.3)
ALBUMIN SERPL-MCNC: 3.1 G/DL (ref 3.5–5.2)
ALP SERPL-CCNC: 165 U/L (ref 40–129)
ALT SERPL-CCNC: 11 U/L (ref 5–41)
ANION GAP SERPL CALCULATED.3IONS-SCNC: 10 MMOL/L (ref 9–17)
AST SERPL-CCNC: 51 U/L
BANDS: 2 % (ref 0–10)
BASOPHILS # BLD: 0 % (ref 0–2)
BASOPHILS ABSOLUTE: 0 K/UL (ref 0–0.2)
BILIRUB SERPL-MCNC: 2.1 MG/DL (ref 0.3–1.2)
BUN SERPL-MCNC: 6 MG/DL (ref 8–23)
CALCIUM SERPL-MCNC: 8.5 MG/DL (ref 8.6–10.4)
CHLORIDE SERPL-SCNC: 102 MMOL/L (ref 98–107)
CO2 SERPL-SCNC: 26 MMOL/L (ref 20–31)
CREAT SERPL-MCNC: 0.45 MG/DL (ref 0.7–1.2)
DATE, STOOL #1: ABNORMAL
EOSINOPHILS RELATIVE PERCENT: 1 % (ref 0–4)
GFR SERPL CREATININE-BSD FRML MDRD: >60 ML/MIN/1.73M2
GLUCOSE SERPL-MCNC: 114 MG/DL (ref 70–99)
HCT VFR BLD AUTO: 24.5 % (ref 41–53)
HCT VFR BLD AUTO: 25.4 % (ref 41–53)
HEMOCCULT SP1 STL QL: POSITIVE
HGB BLD-MCNC: 7.2 G/DL (ref 13.5–17.5)
HGB BLD-MCNC: 7.7 G/DL (ref 13.5–17.5)
INR PPP: 1.4
LDLC SERPL-MCNC: 124 U/L (ref 135–225)
LIPASE SERPL-CCNC: 12 U/L (ref 13–60)
LYMPHOCYTES # BLD: 8 % (ref 24–44)
MAGNESIUM SERPL-MCNC: 1.5 MG/DL (ref 1.6–2.6)
MCH RBC QN AUTO: 22.2 PG (ref 26–34)
MCHC RBC AUTO-ENTMCNC: 31.3 G/DL (ref 31–37)
MCV RBC AUTO: 70.8 FL (ref 80–100)
MONOCYTES # BLD: 20 % (ref 1–7)
MORPHOLOGY: ABNORMAL
PDW BLD-RTO: 18.5 % (ref 11.5–14.9)
PLATELET # BLD AUTO: 72 K/UL (ref 150–450)
PMV BLD AUTO: 6.9 FL (ref 6–12)
POTASSIUM SERPL-SCNC: 3.7 MMOL/L (ref 3.7–5.3)
PROT SERPL-MCNC: 6.4 G/DL (ref 6.4–8.3)
PROTHROMBIN TIME: 17.6 SEC (ref 11.8–14.6)
RBC # BLD: 3.46 M/UL (ref 4.5–5.9)
SEG NEUTROPHILS: 69 % (ref 36–66)
SEGMENTED NEUTROPHILS ABSOLUTE COUNT: 2.76 K/UL (ref 1.3–9.1)
SODIUM SERPL-SCNC: 138 MMOL/L (ref 135–144)
TIME, STOOL #1: ABNORMAL
TROPONIN I SERPL DL<=0.01 NG/ML-MCNC: 28 NG/L (ref 0–22)
TROPONIN I SERPL DL<=0.01 NG/ML-MCNC: 30 NG/L (ref 0–22)
TSH SERPL-ACNC: 1.45 UIU/ML (ref 0.3–5)
WBC # BLD AUTO: 4 K/UL (ref 3.5–11)

## 2023-03-16 PROCEDURE — 2580000003 HC RX 258

## 2023-03-16 PROCEDURE — 83550 IRON BINDING TEST: CPT

## 2023-03-16 PROCEDURE — 82607 VITAMIN B-12: CPT

## 2023-03-16 PROCEDURE — 6360000002 HC RX W HCPCS

## 2023-03-16 PROCEDURE — 83735 ASSAY OF MAGNESIUM: CPT

## 2023-03-16 PROCEDURE — C9113 INJ PANTOPRAZOLE SODIUM, VIA: HCPCS | Performed by: EMERGENCY MEDICINE

## 2023-03-16 PROCEDURE — 82746 ASSAY OF FOLIC ACID SERUM: CPT

## 2023-03-16 PROCEDURE — 2580000003 HC RX 258: Performed by: EMERGENCY MEDICINE

## 2023-03-16 PROCEDURE — 83615 LACTATE (LD) (LDH) ENZYME: CPT

## 2023-03-16 PROCEDURE — 82270 OCCULT BLOOD FECES: CPT

## 2023-03-16 PROCEDURE — 2580000003 HC RX 258: Performed by: INTERNAL MEDICINE

## 2023-03-16 PROCEDURE — A4216 STERILE WATER/SALINE, 10 ML: HCPCS | Performed by: EMERGENCY MEDICINE

## 2023-03-16 PROCEDURE — 85018 HEMOGLOBIN: CPT

## 2023-03-16 PROCEDURE — 99285 EMERGENCY DEPT VISIT HI MDM: CPT

## 2023-03-16 PROCEDURE — 2060000000 HC ICU INTERMEDIATE R&B

## 2023-03-16 PROCEDURE — 85610 PROTHROMBIN TIME: CPT

## 2023-03-16 PROCEDURE — 83690 ASSAY OF LIPASE: CPT

## 2023-03-16 PROCEDURE — 80053 COMPREHEN METABOLIC PANEL: CPT

## 2023-03-16 PROCEDURE — 85014 HEMATOCRIT: CPT

## 2023-03-16 PROCEDURE — 6360000002 HC RX W HCPCS: Performed by: EMERGENCY MEDICINE

## 2023-03-16 PROCEDURE — 84484 ASSAY OF TROPONIN QUANT: CPT

## 2023-03-16 PROCEDURE — 2500000003 HC RX 250 WO HCPCS

## 2023-03-16 PROCEDURE — 71045 X-RAY EXAM CHEST 1 VIEW: CPT

## 2023-03-16 PROCEDURE — 83540 ASSAY OF IRON: CPT

## 2023-03-16 PROCEDURE — 96374 THER/PROPH/DIAG INJ IV PUSH: CPT

## 2023-03-16 PROCEDURE — 93005 ELECTROCARDIOGRAM TRACING: CPT | Performed by: EMERGENCY MEDICINE

## 2023-03-16 PROCEDURE — 84443 ASSAY THYROID STIM HORMONE: CPT

## 2023-03-16 PROCEDURE — 99223 1ST HOSP IP/OBS HIGH 75: CPT | Performed by: INTERNAL MEDICINE

## 2023-03-16 PROCEDURE — 36415 COLL VENOUS BLD VENIPUNCTURE: CPT

## 2023-03-16 PROCEDURE — 85025 COMPLETE CBC W/AUTO DIFF WBC: CPT

## 2023-03-16 PROCEDURE — 6360000002 HC RX W HCPCS: Performed by: INTERNAL MEDICINE

## 2023-03-16 PROCEDURE — 6370000000 HC RX 637 (ALT 250 FOR IP)

## 2023-03-16 PROCEDURE — 82728 ASSAY OF FERRITIN: CPT

## 2023-03-16 RX ORDER — SODIUM CHLORIDE 0.9 % (FLUSH) 0.9 %
5-40 SYRINGE (ML) INJECTION PRN
Status: DISCONTINUED | OUTPATIENT
Start: 2023-03-16 | End: 2023-03-20 | Stop reason: SDUPTHER

## 2023-03-16 RX ORDER — FUROSEMIDE 40 MG/1
40 TABLET ORAL 2 TIMES DAILY
Status: DISCONTINUED | OUTPATIENT
Start: 2023-03-16 | End: 2023-03-25 | Stop reason: HOSPADM

## 2023-03-16 RX ORDER — ONDANSETRON 2 MG/ML
4 INJECTION INTRAMUSCULAR; INTRAVENOUS EVERY 6 HOURS PRN
Status: DISCONTINUED | OUTPATIENT
Start: 2023-03-16 | End: 2023-03-17

## 2023-03-16 RX ORDER — METOPROLOL TARTRATE 5 MG/5ML
5 INJECTION INTRAVENOUS EVERY 6 HOURS PRN
Status: DISCONTINUED | OUTPATIENT
Start: 2023-03-16 | End: 2023-03-25 | Stop reason: HOSPADM

## 2023-03-16 RX ORDER — ACETAMINOPHEN 650 MG/1
650 SUPPOSITORY RECTAL EVERY 6 HOURS PRN
Status: DISCONTINUED | OUTPATIENT
Start: 2023-03-16 | End: 2023-03-25 | Stop reason: HOSPADM

## 2023-03-16 RX ORDER — MORPHINE SULFATE 2 MG/ML
1 INJECTION, SOLUTION INTRAMUSCULAR; INTRAVENOUS EVERY 4 HOURS PRN
Status: DISCONTINUED | OUTPATIENT
Start: 2023-03-16 | End: 2023-03-25 | Stop reason: HOSPADM

## 2023-03-16 RX ORDER — POTASSIUM CHLORIDE 7.45 MG/ML
10 INJECTION INTRAVENOUS PRN
Status: DISCONTINUED | OUTPATIENT
Start: 2023-03-16 | End: 2023-03-20

## 2023-03-16 RX ORDER — LACTULOSE 10 G/15ML
20 SOLUTION ORAL 3 TIMES DAILY
Status: DISCONTINUED | OUTPATIENT
Start: 2023-03-16 | End: 2023-03-25 | Stop reason: HOSPADM

## 2023-03-16 RX ORDER — SPIRONOLACTONE 25 MG/1
50 TABLET ORAL DAILY
Status: DISCONTINUED | OUTPATIENT
Start: 2023-03-16 | End: 2023-03-25 | Stop reason: HOSPADM

## 2023-03-16 RX ORDER — POLYETHYLENE GLYCOL 3350 17 G/17G
17 POWDER, FOR SOLUTION ORAL DAILY PRN
Status: DISCONTINUED | OUTPATIENT
Start: 2023-03-16 | End: 2023-03-25 | Stop reason: HOSPADM

## 2023-03-16 RX ORDER — ENOXAPARIN SODIUM 100 MG/ML
40 INJECTION SUBCUTANEOUS DAILY
Status: CANCELLED | OUTPATIENT
Start: 2023-03-16

## 2023-03-16 RX ORDER — ATORVASTATIN CALCIUM 20 MG/1
20 TABLET, FILM COATED ORAL NIGHTLY
Status: DISCONTINUED | OUTPATIENT
Start: 2023-03-16 | End: 2023-03-25 | Stop reason: HOSPADM

## 2023-03-16 RX ORDER — ACETAMINOPHEN 325 MG/1
650 TABLET ORAL EVERY 6 HOURS PRN
Status: DISCONTINUED | OUTPATIENT
Start: 2023-03-16 | End: 2023-03-25 | Stop reason: HOSPADM

## 2023-03-16 RX ORDER — MAGNESIUM SULFATE HEPTAHYDRATE 40 MG/ML
2000 INJECTION, SOLUTION INTRAVENOUS PRN
Status: DISCONTINUED | OUTPATIENT
Start: 2023-03-16 | End: 2023-03-25 | Stop reason: HOSPADM

## 2023-03-16 RX ORDER — FERROUS SULFATE 325(65) MG
325 TABLET ORAL
Status: DISCONTINUED | OUTPATIENT
Start: 2023-03-17 | End: 2023-03-25 | Stop reason: HOSPADM

## 2023-03-16 RX ORDER — ONDANSETRON 4 MG/1
4 TABLET, ORALLY DISINTEGRATING ORAL EVERY 8 HOURS PRN
Status: DISCONTINUED | OUTPATIENT
Start: 2023-03-16 | End: 2023-03-17

## 2023-03-16 RX ORDER — SODIUM CHLORIDE 9 MG/ML
INJECTION, SOLUTION INTRAVENOUS PRN
Status: DISCONTINUED | OUTPATIENT
Start: 2023-03-16 | End: 2023-03-20 | Stop reason: SDUPTHER

## 2023-03-16 RX ORDER — SODIUM CHLORIDE 0.9 % (FLUSH) 0.9 %
5-40 SYRINGE (ML) INJECTION EVERY 12 HOURS SCHEDULED
Status: DISCONTINUED | OUTPATIENT
Start: 2023-03-16 | End: 2023-03-20 | Stop reason: SDUPTHER

## 2023-03-16 RX ADMIN — MAGNESIUM SULFATE HEPTAHYDRATE 2000 MG: 40 INJECTION, SOLUTION INTRAVENOUS at 13:15

## 2023-03-16 RX ADMIN — OCTREOTIDE ACETATE 50 MCG/HR: 500 INJECTION, SOLUTION INTRAVENOUS; SUBCUTANEOUS at 19:17

## 2023-03-16 RX ADMIN — SODIUM CHLORIDE 50 ML: 9 INJECTION, SOLUTION INTRAVENOUS at 13:13

## 2023-03-16 RX ADMIN — LACTULOSE 20 G: 20 SOLUTION ORAL at 20:39

## 2023-03-16 RX ADMIN — CEFTRIAXONE SODIUM 1000 MG: 1 INJECTION, POWDER, FOR SOLUTION INTRAMUSCULAR; INTRAVENOUS at 17:35

## 2023-03-16 RX ADMIN — MORPHINE SULFATE 1 MG: 2 INJECTION, SOLUTION INTRAMUSCULAR; INTRAVENOUS at 20:39

## 2023-03-16 RX ADMIN — SODIUM CHLORIDE, PRESERVATIVE FREE 40 MG: 5 INJECTION INTRAVENOUS at 11:22

## 2023-03-16 RX ADMIN — SODIUM CHLORIDE, PRESERVATIVE FREE 40 MG: 5 INJECTION INTRAVENOUS at 22:53

## 2023-03-16 RX ADMIN — Medication 10 ML: at 20:58

## 2023-03-16 RX ADMIN — SPIRONOLACTONE 50 MG: 25 TABLET ORAL at 13:19

## 2023-03-16 RX ADMIN — ONDANSETRON 4 MG: 2 INJECTION INTRAMUSCULAR; INTRAVENOUS at 13:01

## 2023-03-16 RX ADMIN — ACETAMINOPHEN 650 MG: 325 TABLET ORAL at 13:00

## 2023-03-16 RX ADMIN — ATORVASTATIN CALCIUM 20 MG: 20 TABLET, FILM COATED ORAL at 20:39

## 2023-03-16 RX ADMIN — LACTULOSE 20 G: 20 SOLUTION ORAL at 13:19

## 2023-03-16 RX ADMIN — ONDANSETRON 4 MG: 2 INJECTION INTRAMUSCULAR; INTRAVENOUS at 22:53

## 2023-03-16 RX ADMIN — FUROSEMIDE 40 MG: 40 TABLET ORAL at 16:14

## 2023-03-16 ASSESSMENT — ENCOUNTER SYMPTOMS
NAUSEA: 1
SHORTNESS OF BREATH: 0
CONSTIPATION: 0
ABDOMINAL PAIN: 0
COLOR CHANGE: 0
COUGH: 0
ABDOMINAL PAIN: 1
DIARRHEA: 0
SORE THROAT: 0
EYE PAIN: 0
VOMITING: 0
CHEST TIGHTNESS: 0
ABDOMINAL DISTENTION: 0
BACK PAIN: 0

## 2023-03-16 ASSESSMENT — PAIN DESCRIPTION - LOCATION: LOCATION: HEAD

## 2023-03-16 ASSESSMENT — PAIN - FUNCTIONAL ASSESSMENT
PAIN_FUNCTIONAL_ASSESSMENT: NONE - DENIES PAIN
PAIN_FUNCTIONAL_ASSESSMENT: NONE - DENIES PAIN

## 2023-03-16 ASSESSMENT — PAIN SCALES - GENERAL
PAINLEVEL_OUTOF10: 10
PAINLEVEL_OUTOF10: 9

## 2023-03-16 ASSESSMENT — LIFESTYLE VARIABLES
HOW OFTEN DO YOU HAVE A DRINK CONTAINING ALCOHOL: NEVER
HOW MANY STANDARD DRINKS CONTAINING ALCOHOL DO YOU HAVE ON A TYPICAL DAY: PATIENT DOES NOT DRINK

## 2023-03-16 NOTE — CONSULTS
NRV CARPAL TUNNE Right 08/29/2017    CARPAL TUNNEL RELEASE RIGHT performed by Jackson Malik MD at [address] &/TRANSPOS MEDIAN NRV CARPAL TUNNE Left 10/31/2017    CARPAL TUNNEL RELEASE performed by Jackson Malik MD at [place] [id number] AA&/STRD TFRML EPI LUMBAR/SACRAL 1 LEVEL Bilateral 09/06/2018    BILATERAL L5 CACHORRO performed by Marielle Lawrence MD at [place] [id number] AA&/STRD TFRML EPI LUMBAR/SACRAL 1 LEVEL N/A 09/28/2018    BILATERAL L5 CACHORRO performed by Marielle Lawrence MD at [address] [place] [address] 12/29/2020    EGD BIOPSY performed by Temitope Yoo MD at [address] [place] [address] 02/02/2021    EGD BIOPSY and spot marking performed by Dejah Hawk MD at [address] [place] [address] 02/12/2021    ENDOSCOPIC ULTRASOUND, EGD performed by Sulema Bar MD at [place] [address]  02/12/2021    EGD DIAGNOSTIC ONLY performed by Sulema Bar MD at [place] [address] N/A 08/31/2021    EGD BIOPSY performed by Dejah Hawk MD at [address] [place] [address] 01/21/2022    EGD BIOPSY performed by Dejah Hawk MD at [address] [place] [address] N/A 04/15/2022    EGD ESOPHAGOGASTRODUODENOSCOPY performed by Temitope Yoo MD at [address] [place] [address] 06/06/2022    EGD BAND LIGATION performed by Florian Crane MD at [address] [place] [address] N/A 06/09/2022    EGD ESOPHAGOGASTRODUODENOSCOPY performed by Florian Crane MD at [address] [place] [address] N/A 09/14/2022    EGD ESOPHAGOGASTRODUODENOSCOPY ENDOSCOPIC APC AT GE JUNCTION performed by Shima Ryan MD at [address] [place] [address] 10/19/2022    EGD BIOPSY performed by Dejah Hawk MD at [address] [place] [address] N/A 10/31/2022 Amy Hoskins MD

## 2023-03-16 NOTE — ED PROVIDER NOTES
chronic (HCC) B18.2    Fatty liver K76.0    Psychophysiologic insomnia F51.04    Cirrhosis (HCC) K74.60    Decompensation of cirrhosis of liver (HCC) K72.90, K74.60    Vertebrogenic low back pain M54.51    DDD (degenerative disc disease), lumbar M51.36    Depression F32. A    Tubular adenoma of colon D12.6    History of colon polyps Z86.010    Gynecomastia, male N62    Lumbar radiculitis M54.16    Lumbar disc herniation M51.26    Tinnitus H93.19    Eustachian tube dysfunction H69.80    Ganglion cyst M67.40    Carpal tunnel syndrome of right wrist G56.01    History of hepatitis C Z86.19    Vitamin D deficiency E55.9    Pure hypercholesterolemia E78.00    Acute hypokalemia E87.6    Essential hypertension I10    Recurrent major depressive disorder in partial remission (HCC) F33.41    S/P epidural steroid injection Z92.241    Elevated LFTs R79.89    Seasonal allergies J30.2    Lumbar facet arthropathy M47.816    Cervical facet syndrome M47.812    Thrombocytopenia (HCC) D69.6    Hepatitis C virus infection resolved after antiviral drug therapy Z86.19    Alcohol abuse F10.10    Altered mental status R41.82    Hypocalcemia E83.51    Hypophosphatemia E83.39    Malignant neoplasm of lower third of esophagus (HCC) C15.5    COVID-19 U07.1    Anxiety F41.9    Current smoker F17.200    Severe comorbid illness R69    Gait instability R26.81    Abnormal findings on diagnostic imaging of spine R93.7    Cervical spinal stenosis M48.02    Spinal stenosis of lumbar region with neurogenic claudication M48.062    Low hemoglobin D64.9    Symptomatic anemia, microcytic, acute D64.9    Hypotension I95.9    Former smoker, 50+ pack years, quit 2016 Z87.891    HLD (hyperlipidemia) E78.5    Esophageal adenocarcinoma (HCC) C15.9    Anemia D64.9    Acute kidney injury ([address] N17.9    Ascites due to alcoholic cirrhosis (HCC) Y84.07    Shortness of breath R06.02    Drop in hemoglobin R71.0    Esophageal polyp K22.81    Acute kidney failure, deficit. Extraocular Movements: Extraocular movements intact. Pupils: Pupils are equal, round, and reactive to light. Cardiovascular:      Rate and Rhythm: Normal rate and regular rhythm. Pulses: Normal pulses. Heart sounds: Normal heart sounds. Pulmonary:      Effort: Pulmonary effort is normal.      Breath sounds: Normal breath sounds. Abdominal:      General: Abdomen is flat. Palpations: Abdomen is soft. Tenderness: There is no abdominal tenderness. Musculoskeletal:         General: No tenderness. Normal range of motion. Cervical back: Neck supple. No spinous process tenderness or muscular tenderness. Skin:     General: Skin is warm and dry. Capillary Refill: Capillary refill takes less than 2 seconds. Neurological:      General: No focal deficit present. Mental Status: He is alert and oriented to person, place, and time. Cranial Nerves: Cranial nerves 2-12 are intact. No cranial nerve deficit, dysarthria or facial asymmetry. Sensory: Sensation is intact. No sensory deficit. Motor: Motor function is intact. No weakness. Coordination: Coordination is intact. Finger-Nose-Finger Test and Heel to [place] Test normal.      Gait: Gait is intact. Psychiatric:         Behavior: Behavior normal.         Thought Content: Thought content does not include homicidal or suicidal ideation. MEDICAL DECISION MAKING / ED COURSE:   21-year-old male presents for complaints of lightheadedness, generalized weakness fatigue and nausea.   On initial exam patient in no acute distress, vitals are stable, abdomen is soft and nontender, no neurodeficits, patient has reported history of anemia he reports his stools been dark for the last couple days      1)  Number and Complexity of Problems Addressed at this Encounter  Problem List This Visit: Fatigue, nausea and weakness    Differential Diagnosis: Anemia, viral illness, GI bleed    Diagnoses Considered but Do

## 2023-03-16 NOTE — H&P
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 Jonah Rebolledo, orthopedic, every 3-4 months, gets steroid injection    Lezama esophagus     Bleeding gastric varices 12/29/2022    BPH (benign prostatic hypertrophy)     Cholelithiasis     Cirrhosis ([address]     COVID-19 12/2020    pt reports he had a positive test while at [facility] in 2020, was asymptomatic    COVID-19 vaccine series completed 5/20/2021, 6/22/2021    Moderna 5/20/2021, 6/22/2021    DDD (degenerative disc disease), lumbar     Depression     Esophageal cancer ([address]     INVASIVE ADENOCARCINOMA ARISING IN TUBULAR ADENOMA WITH HIGH GRADE DYSPLASIA, ASSOCIATED WITH FOCAL INTESTINAL METAPLASIA     Esophageal varices ([address]     Fatty liver     GERD (gastroesophageal reflux disease)     GI bleed     Heart murmur     Hep C w/o coma, chronic ([address]     Hiatal hernia     History of alcohol abuse     6-12 beers a day; quit drinking 2019    History of blood transfusion     History of colon polyps 2016    History of tobacco abuse     [facility] (hard of hearing)     Hyperlipidemia     Hypertension     Hyponatremia 07/20/2016    Hypotension 12/20/2021    Mastoid disorder, bilateral 12/31/2022    biLateral mastoid effusion    Pericardial effusion     PONV (postoperative nausea and vomiting)     Poor venous access     has port in place.     Port-A-Cath in place     right upper chest    Portal hypertension ([address]     Sciatica     Secondary esophageal varices ([address] 06/07/2022    Shortness of breath     Spinal stenosis     Stomach ulcer     hx of    Thrombocytopenia ([address] 12/23/2020    Tubular adenoma of colon 2016, 2018    Vitamin D deficiency     Wears glasses         Past SurgicalHistory:     Past Duration 80 ms    Q-T Interval 364 ms    QTc Calculation (Bazett) 481 ms    R Axis 68 degrees    T Axis 20 degrees   CBC with Auto Differential    Collection Time: 03/16/23  9:28 AM   Result Value Ref Range    WBC 4.0 3.5 - 11.0 k/uL    RBC 3.46 (L) 4.5 - 5.9 m/uL    Hemoglobin 7.7 (L) 13.5 - 17.5 g/dL    Hematocrit 24.5 (L) 41 - 53 %    MCV 70.8 (L) 80 - 100 fL    MCH 22.2 (L) 26 - 34 pg    MCHC 31.3 31 - 37 g/dL    RDW 18.5 (H) 11.5 - 14.9 %    Platelets 72 (L) 063 - 450 k/uL    MPV 6.9 6.0 - 12.0 fL    Seg Neutrophils 69 (H) 36 - 66 %    Lymphocytes 8 (L) 24 - 44 %    Monocytes 20 (H) 1 - 7 %    Eosinophils % 1 0 - 4 %    Basophils 0 0 - 2 %    Bands 2 0 - 10 %    Segs Absolute 2.76 1.3 - 9.1 k/uL    Absolute Lymph # 0.32 (L) 1.0 - 4.8 k/uL    Absolute Mono # 0.80 0.1 - 1.3 k/uL    Absolute Eos # 0.04 0.0 - 0.4 k/uL    Basophils Absolute 0.00 0.0 - 0.2 k/uL    Absolute Bands # 0.08 0.0 - 1.0 k/uL    Morphology ANISOCYTOSIS PRESENT     Morphology MICROCYTOSIS PRESENT     Morphology HYPOCHROMIA PRESENT     Morphology SLT POLYCHROMASIA     Morphology FEW ELLIPTOCYTES    Protime-INR    Collection Time: 03/16/23  9:28 AM   Result Value Ref Range    Protime 17.6 (H) 11.8 - 14.6 sec    INR 1.4    Troponin    Collection Time: 03/16/23 10:16 AM   Result Value Ref Range    Troponin, High Sensitivity 28 (H) 0 - 22 ng/L   OCCULT BLOOD SCREEN    Collection Time: 03/16/23 10:52 AM   Result Value Ref Range    Occult Blood, Stool #1 POSITIVE (A) NEGATIVE    Date, Stool #1 [phone]     Time, Stool #1 UNKNOWN        Imaging/Diagnostics:  XR CHEST PORTABLE    Result Date: 3/16/2023  EXAMINATION: ONE XRAY VIEW OF THE CHEST 3/16/2023 8:35 am COMPARISON: 01/24/2023 HISTORY: ORDERING SYSTEM PROVIDED HISTORY: dizzy TECHNOLOGIST PROVIDED HISTORY: dizzy Reason for Exam: weakness, vertigo FINDINGS: Right chest port remains in place. Heart size is stable.   Small bilateral pleural effusions and patchy bibasilar and perihilar opacities are

## 2023-03-16 NOTE — ED NOTES
RN at bedside for rectal exam with MD. Sample sent, additional blanket provided.      Ashely Tineo RN  03/16/23 4927

## 2023-03-16 NOTE — PLAN OF CARE
Problem: Safety - Adult  Goal: Free from fall injury  Outcome: Progressing     Problem: Discharge Planning  Goal: Discharge to home or other facility with appropriate resources  Outcome: Progressing     Problem: ABCDS Injury Assessment  Goal: Absence of physical injury  Outcome: Progressing

## 2023-03-16 NOTE — ED TRIAGE NOTES
Mode of arrival (squad #, walk in, police, etc) : squad        Chief complaint(s): Pt c/o dizziness, nausea and weakness. Arrival Note (brief scenario, treatment PTA, etc). : Pt states symptoms began at 0300. C= \"Have you ever felt that you should Cut down on your drinking? \"  No  A= \"Have people Annoyed you by criticizing your drinking? \"  No  G= \"Have you ever felt bad or Guilty about your drinking? \"  No  E= \"Have you ever had a drink as an Eye-opener first thing in the morning to steady your nerves or to help a hangover? \"  No      Deferred []      Reason for deferring: N/A    *If yes to two or more: probable alcohol abuse. *

## 2023-03-17 ENCOUNTER — ANESTHESIA EVENT (OUTPATIENT)
Dept: ENDOSCOPY | Age: 63
End: 2023-03-17
Payer: COMMERCIAL

## 2023-03-17 ENCOUNTER — ANESTHESIA (OUTPATIENT)
Dept: ENDOSCOPY | Age: 63
End: 2023-03-17
Payer: COMMERCIAL

## 2023-03-17 ENCOUNTER — APPOINTMENT (OUTPATIENT)
Dept: INTERVENTIONAL RADIOLOGY/VASCULAR | Age: 63
DRG: 432 | End: 2023-03-17
Payer: COMMERCIAL

## 2023-03-17 ENCOUNTER — APPOINTMENT (OUTPATIENT)
Dept: ULTRASOUND IMAGING | Age: 63
DRG: 432 | End: 2023-03-17
Payer: COMMERCIAL

## 2023-03-17 PROBLEM — R42 LIGHTHEADEDNESS: Status: ACTIVE | Noted: 2023-03-17

## 2023-03-17 LAB
ABSOLUTE EOS #: 0.04 K/UL (ref 0–0.4)
ABSOLUTE LYMPH #: 0.43 K/UL (ref 1–4.8)
ABSOLUTE MONO #: 0.39 K/UL (ref 0.1–1.3)
ALT SERPL-CCNC: 3.3 UG/L
ANION GAP SERPL CALCULATED.3IONS-SCNC: 10 MMOL/L (ref 9–17)
BASOPHILS # BLD: 0 % (ref 0–2)
BASOPHILS ABSOLUTE: 0 K/UL (ref 0–0.2)
BUN SERPL-MCNC: 10 MG/DL (ref 8–23)
CALCIUM SERPL-MCNC: 8.4 MG/DL (ref 8.6–10.4)
CHLORIDE SERPL-SCNC: 101 MMOL/L (ref 98–107)
CO2 SERPL-SCNC: 26 MMOL/L (ref 20–31)
CREAT SERPL-MCNC: 0.62 MG/DL (ref 0.7–1.2)
EKG ATRIAL RATE: 101 BPM
EKG Q-T INTERVAL: 364 MS
EKG QRS DURATION: 80 MS
EKG QTC CALCULATION (BAZETT): 481 MS
EKG R AXIS: 68 DEGREES
EKG T AXIS: 20 DEGREES
EKG VENTRICULAR RATE: 105 BPM
EOSINOPHILS RELATIVE PERCENT: 1 % (ref 0–4)
FERRITIN SERPL-MCNC: 32 NG/ML (ref 30–400)
FOLATE SERPL-MCNC: NORMAL NG/ML
GFR SERPL CREATININE-BSD FRML MDRD: >60 ML/MIN/1.73M2
GLUCOSE SERPL-MCNC: 135 MG/DL (ref 70–99)
HCT VFR BLD AUTO: 23.5 % (ref 41–53)
HCT VFR BLD AUTO: 23.8 % (ref 41–53)
HCT VFR BLD AUTO: 24.7 % (ref 41–53)
HGB BLD-MCNC: 7 G/DL (ref 13.5–17.5)
HGB BLD-MCNC: 7.2 G/DL (ref 13.5–17.5)
HGB BLD-MCNC: 7.3 G/DL (ref 13.5–17.5)
IRON SATURATION: 15 % (ref 20–55)
IRON SERPL-MCNC: 45 UG/DL (ref 59–158)
LYMPHOCYTES # BLD: 11 % (ref 24–44)
MCH RBC QN AUTO: 21.6 PG (ref 26–34)
MCHC RBC AUTO-ENTMCNC: 29.5 G/DL (ref 31–37)
MCV RBC AUTO: 73.2 FL (ref 80–100)
MONOCYTES # BLD: 10 % (ref 1–7)
MORPHOLOGY: ABNORMAL
PDW BLD-RTO: 19 % (ref 11.5–14.9)
PLATELET # BLD AUTO: 72 K/UL (ref 150–450)
PMV BLD AUTO: 7.7 FL (ref 6–12)
POTASSIUM SERPL-SCNC: 3.6 MMOL/L (ref 3.7–5.3)
RBC # BLD: 3.38 M/UL (ref 4.5–5.9)
SEG NEUTROPHILS: 78 % (ref 36–66)
SEGMENTED NEUTROPHILS ABSOLUTE COUNT: 3.04 K/UL (ref 1.3–9.1)
SODIUM SERPL-SCNC: 137 MMOL/L (ref 135–144)
TIBC SERPL-MCNC: 293 UG/DL (ref 250–450)
UNSATURATED IRON BINDING CAPACITY: 248 UG/DL (ref 112–347)
VIT B12 SERPL-MCNC: 1010 PG/ML (ref 232–1245)
WBC # BLD AUTO: 3.9 K/UL (ref 3.5–11)

## 2023-03-17 PROCEDURE — 6370000000 HC RX 637 (ALT 250 FOR IP)

## 2023-03-17 PROCEDURE — 2580000003 HC RX 258: Performed by: EMERGENCY MEDICINE

## 2023-03-17 PROCEDURE — 6360000002 HC RX W HCPCS

## 2023-03-17 PROCEDURE — 3609013000 HC EGD TRANSORAL CONTROL BLEEDING ANY METHOD: Performed by: INTERNAL MEDICINE

## 2023-03-17 PROCEDURE — 99232 SBSQ HOSP IP/OBS MODERATE 35: CPT | Performed by: INTERNAL MEDICINE

## 2023-03-17 PROCEDURE — C1889 IMPLANT/INSERT DEVICE, NOC: HCPCS | Performed by: INTERNAL MEDICINE

## 2023-03-17 PROCEDURE — 6360000002 HC RX W HCPCS: Performed by: INTERNAL MEDICINE

## 2023-03-17 PROCEDURE — 6370000000 HC RX 637 (ALT 250 FOR IP): Performed by: INTERNAL MEDICINE

## 2023-03-17 PROCEDURE — 2580000003 HC RX 258: Performed by: ANESTHESIOLOGY

## 2023-03-17 PROCEDURE — A4216 STERILE WATER/SALINE, 10 ML: HCPCS | Performed by: EMERGENCY MEDICINE

## 2023-03-17 PROCEDURE — 2709999900 HC NON-CHARGEABLE SUPPLY: Performed by: INTERNAL MEDICINE

## 2023-03-17 PROCEDURE — 6360000002 HC RX W HCPCS: Performed by: NURSE ANESTHETIST, CERTIFIED REGISTERED

## 2023-03-17 PROCEDURE — 6360000002 HC RX W HCPCS: Performed by: EMERGENCY MEDICINE

## 2023-03-17 PROCEDURE — 7100000001 HC PACU RECOVERY - ADDTL 15 MIN: Performed by: INTERNAL MEDICINE

## 2023-03-17 PROCEDURE — 2580000003 HC RX 258: Performed by: INTERNAL MEDICINE

## 2023-03-17 PROCEDURE — 93010 ELECTROCARDIOGRAM REPORT: CPT | Performed by: INTERNAL MEDICINE

## 2023-03-17 PROCEDURE — 2580000003 HC RX 258: Performed by: NURSE ANESTHETIST, CERTIFIED REGISTERED

## 2023-03-17 PROCEDURE — 6360000002 HC RX W HCPCS: Performed by: NURSE PRACTITIONER

## 2023-03-17 PROCEDURE — 85018 HEMOGLOBIN: CPT

## 2023-03-17 PROCEDURE — 0W9G3ZZ DRAINAGE OF PERITONEAL CAVITY, PERCUTANEOUS APPROACH: ICD-10-PCS | Performed by: RADIOLOGY

## 2023-03-17 PROCEDURE — 36415 COLL VENOUS BLD VENIPUNCTURE: CPT

## 2023-03-17 PROCEDURE — C9113 INJ PANTOPRAZOLE SODIUM, VIA: HCPCS | Performed by: NURSE PRACTITIONER

## 2023-03-17 PROCEDURE — 85014 HEMATOCRIT: CPT

## 2023-03-17 PROCEDURE — 2580000003 HC RX 258: Performed by: NURSE PRACTITIONER

## 2023-03-17 PROCEDURE — 3700000001 HC ADD 15 MINUTES (ANESTHESIA): Performed by: INTERNAL MEDICINE

## 2023-03-17 PROCEDURE — 0DB18ZX EXCISION OF UPPER ESOPHAGUS, VIA NATURAL OR ARTIFICIAL OPENING ENDOSCOPIC, DIAGNOSTIC: ICD-10-PCS | Performed by: INTERNAL MEDICINE

## 2023-03-17 PROCEDURE — 7100000000 HC PACU RECOVERY - FIRST 15 MIN: Performed by: INTERNAL MEDICINE

## 2023-03-17 PROCEDURE — 76775 US EXAM ABDO BACK WALL LIM: CPT

## 2023-03-17 PROCEDURE — 3700000000 HC ANESTHESIA ATTENDED CARE: Performed by: INTERNAL MEDICINE

## 2023-03-17 PROCEDURE — 82105 ALPHA-FETOPROTEIN SERUM: CPT

## 2023-03-17 PROCEDURE — 76705 ECHO EXAM OF ABDOMEN: CPT

## 2023-03-17 PROCEDURE — 43255 EGD CONTROL BLEEDING ANY: CPT | Performed by: INTERNAL MEDICINE

## 2023-03-17 PROCEDURE — C9113 INJ PANTOPRAZOLE SODIUM, VIA: HCPCS | Performed by: EMERGENCY MEDICINE

## 2023-03-17 PROCEDURE — 2060000000 HC ICU INTERMEDIATE R&B

## 2023-03-17 PROCEDURE — 80048 BASIC METABOLIC PNL TOTAL CA: CPT

## 2023-03-17 PROCEDURE — 85025 COMPLETE CBC W/AUTO DIFF WBC: CPT

## 2023-03-17 RX ORDER — SODIUM CHLORIDE 0.9 % (FLUSH) 0.9 %
5-40 SYRINGE (ML) INJECTION PRN
Status: DISCONTINUED | OUTPATIENT
Start: 2023-03-17 | End: 2023-03-20 | Stop reason: SDUPTHER

## 2023-03-17 RX ORDER — SCOLOPAMINE TRANSDERMAL SYSTEM 1 MG/1
1 PATCH, EXTENDED RELEASE TRANSDERMAL
Status: DISCONTINUED | OUTPATIENT
Start: 2023-03-17 | End: 2023-03-25 | Stop reason: HOSPADM

## 2023-03-17 RX ORDER — DIPHENHYDRAMINE HYDROCHLORIDE 50 MG/ML
25 INJECTION INTRAMUSCULAR; INTRAVENOUS EVERY 6 HOURS PRN
Status: DISCONTINUED | OUTPATIENT
Start: 2023-03-17 | End: 2023-03-17

## 2023-03-17 RX ORDER — LANOLIN ALCOHOL/MO/W.PET/CERES
3 CREAM (GRAM) TOPICAL NIGHTLY PRN
Status: DISCONTINUED | OUTPATIENT
Start: 2023-03-17 | End: 2023-03-25 | Stop reason: HOSPADM

## 2023-03-17 RX ORDER — PROMETHAZINE HYDROCHLORIDE 25 MG/ML
25 INJECTION, SOLUTION INTRAMUSCULAR; INTRAVENOUS EVERY 6 HOURS PRN
Status: DISCONTINUED | OUTPATIENT
Start: 2023-03-17 | End: 2023-03-25 | Stop reason: HOSPADM

## 2023-03-17 RX ORDER — PROPOFOL 10 MG/ML
INJECTION, EMULSION INTRAVENOUS PRN
Status: DISCONTINUED | OUTPATIENT
Start: 2023-03-17 | End: 2023-03-17 | Stop reason: SDUPTHER

## 2023-03-17 RX ORDER — GAUZE BANDAGE 2" X 2"
100 BANDAGE TOPICAL DAILY
Status: DISCONTINUED | OUTPATIENT
Start: 2023-03-17 | End: 2023-03-19

## 2023-03-17 RX ORDER — SODIUM CHLORIDE, SODIUM LACTATE, POTASSIUM CHLORIDE, CALCIUM CHLORIDE 600; 310; 30; 20 MG/100ML; MG/100ML; MG/100ML; MG/100ML
INJECTION, SOLUTION INTRAVENOUS CONTINUOUS PRN
Status: DISCONTINUED | OUTPATIENT
Start: 2023-03-17 | End: 2023-03-17 | Stop reason: SDUPTHER

## 2023-03-17 RX ORDER — SODIUM CHLORIDE 9 MG/ML
INJECTION, SOLUTION INTRAVENOUS PRN
Status: DISCONTINUED | OUTPATIENT
Start: 2023-03-17 | End: 2023-03-20 | Stop reason: SDUPTHER

## 2023-03-17 RX ORDER — SODIUM CHLORIDE 0.9 % (FLUSH) 0.9 %
5-40 SYRINGE (ML) INJECTION EVERY 12 HOURS SCHEDULED
Status: DISCONTINUED | OUTPATIENT
Start: 2023-03-17 | End: 2023-03-20 | Stop reason: SDUPTHER

## 2023-03-17 RX ORDER — SODIUM CHLORIDE 9 MG/ML
INJECTION, SOLUTION INTRAVENOUS CONTINUOUS
Status: DISCONTINUED | OUTPATIENT
Start: 2023-03-17 | End: 2023-03-17 | Stop reason: HOSPADM

## 2023-03-17 RX ADMIN — FERROUS SULFATE TAB 325 MG (65 MG ELEMENTAL FE) 325 MG: 325 (65 FE) TAB at 10:54

## 2023-03-17 RX ADMIN — PROPOFOL 30 MG: 10 INJECTION, EMULSION INTRAVENOUS at 13:51

## 2023-03-17 RX ADMIN — MORPHINE SULFATE 1 MG: 2 INJECTION, SOLUTION INTRAMUSCULAR; INTRAVENOUS at 00:41

## 2023-03-17 RX ADMIN — OCTREOTIDE ACETATE 50 MCG/HR: 500 INJECTION, SOLUTION INTRAVENOUS; SUBCUTANEOUS at 06:04

## 2023-03-17 RX ADMIN — FUROSEMIDE 40 MG: 40 TABLET ORAL at 17:58

## 2023-03-17 RX ADMIN — CEFTRIAXONE SODIUM 1000 MG: 1 INJECTION, POWDER, FOR SOLUTION INTRAMUSCULAR; INTRAVENOUS at 19:33

## 2023-03-17 RX ADMIN — LACTULOSE 20 G: 20 SOLUTION ORAL at 21:16

## 2023-03-17 RX ADMIN — SODIUM CHLORIDE 80 MG: 9 INJECTION, SOLUTION INTRAVENOUS at 17:55

## 2023-03-17 RX ADMIN — LACTULOSE 20 G: 20 SOLUTION ORAL at 10:55

## 2023-03-17 RX ADMIN — METOPROLOL TARTRATE 25 MG: 25 TABLET, FILM COATED ORAL at 21:15

## 2023-03-17 RX ADMIN — PROPOFOL 100 MG: 10 INJECTION, EMULSION INTRAVENOUS at 13:42

## 2023-03-17 RX ADMIN — ATORVASTATIN CALCIUM 20 MG: 20 TABLET, FILM COATED ORAL at 21:15

## 2023-03-17 RX ADMIN — DIPHENHYDRAMINE HYDROCHLORIDE 25 MG: 50 INJECTION, SOLUTION INTRAMUSCULAR; INTRAVENOUS at 04:54

## 2023-03-17 RX ADMIN — SPIRONOLACTONE 50 MG: 25 TABLET ORAL at 10:55

## 2023-03-17 RX ADMIN — PROPOFOL 30 MG: 10 INJECTION, EMULSION INTRAVENOUS at 14:00

## 2023-03-17 RX ADMIN — PANTOPRAZOLE SODIUM 8 MG/HR: 40 INJECTION, POWDER, FOR SOLUTION INTRAVENOUS at 19:08

## 2023-03-17 RX ADMIN — PROPOFOL 50 MG: 10 INJECTION, EMULSION INTRAVENOUS at 13:44

## 2023-03-17 RX ADMIN — PROPOFOL 30 MG: 10 INJECTION, EMULSION INTRAVENOUS at 13:55

## 2023-03-17 RX ADMIN — OCTREOTIDE ACETATE 50 MCG/HR: 500 INJECTION, SOLUTION INTRAVENOUS; SUBCUTANEOUS at 17:54

## 2023-03-17 RX ADMIN — ACETAMINOPHEN 650 MG: 325 TABLET ORAL at 21:15

## 2023-03-17 RX ADMIN — SODIUM CHLORIDE, POTASSIUM CHLORIDE, SODIUM LACTATE AND CALCIUM CHLORIDE: 600; 310; 30; 20 INJECTION, SOLUTION INTRAVENOUS at 13:39

## 2023-03-17 RX ADMIN — PROPOFOL 50 MG: 10 INJECTION, EMULSION INTRAVENOUS at 13:47

## 2023-03-17 RX ADMIN — SODIUM CHLORIDE: 9 INJECTION, SOLUTION INTRAVENOUS at 13:02

## 2023-03-17 RX ADMIN — THIAMINE HCL TAB 100 MG 100 MG: 100 TAB at 10:54

## 2023-03-17 RX ADMIN — FUROSEMIDE 40 MG: 40 TABLET ORAL at 10:54

## 2023-03-17 RX ADMIN — SODIUM CHLORIDE, PRESERVATIVE FREE 40 MG: 5 INJECTION INTRAVENOUS at 10:55

## 2023-03-17 ASSESSMENT — ENCOUNTER SYMPTOMS
COUGH: 0
ABDOMINAL DISTENTION: 1
NAUSEA: 0
SHORTNESS OF BREATH: 1
SHORTNESS OF BREATH: 0
BACK PAIN: 0
ABDOMINAL PAIN: 0
COLOR CHANGE: 0
SORE THROAT: 0
DIARRHEA: 0
CHEST TIGHTNESS: 0

## 2023-03-17 ASSESSMENT — PAIN SCALES - GENERAL
PAINLEVEL_OUTOF10: 9
PAINLEVEL_OUTOF10: 7
PAINLEVEL_OUTOF10: 0

## 2023-03-17 ASSESSMENT — PAIN DESCRIPTION - DESCRIPTORS: DESCRIPTORS: ACHING

## 2023-03-17 ASSESSMENT — PAIN - FUNCTIONAL ASSESSMENT: PAIN_FUNCTIONAL_ASSESSMENT: 0-10

## 2023-03-17 NOTE — PROGRESS NOTES
RN perfect served GI regarding diet for patient. Awaiting response. Patient back from EGD, no signs of distress. Patient to remain NPO per GI. GI recommending a transfer to [facility]. Vs and starting protonix drip.

## 2023-03-17 NOTE — ANESTHESIA POSTPROCEDURE EVALUATION
POST- ANESTHESIA EVALUATION       Pt Name: Jeffrey Nelson  MRN: [id number]  YOB: 1960  Date of evaluation: 3/17/2023  Time:  3:14 PM      /74   Pulse 79   Temp 98.7 °F (37.1 °C) (Infrared)   Resp 18   Ht 5' 10\" (1.778 m)   Wt 195 lb (88.5 kg)   SpO2 94%   BMI 27.98 kg/m²      Consciousness Level  Awake  Cardiopulmonary Status  Stable  Pain Adequately Treated YES  Nausea / Vomiting  NO  Adequate Hydration  YES  Anesthesia Related Complications NONE      Electronically signed by Nawaf Pineda MD on 3/17/2023 at 3:14 PM       Department of Anesthesiology  Postprocedure Note    Patient: Jeffery Nelson  MRN: [id number]  YOB: 1960  Date of evaluation: 3/17/2023      Procedure Summary     Date: 03/17/23 Room / Location: [facility] / [address] [place] [address]    Anesthesia Start: 4829 Anesthesia Stop: 1698    Procedure: EGD WITH RESOLUTION CLIP APPLICATION X3 (Esophagus) Diagnosis:       Hematemesis, unspecified whether nausea present      (Hematemesis, unspecified whether nausea present [K92.0])    Surgeons: Serenity Jenkins MD Responsible Provider: Nawaf Pineda MD    Anesthesia Type: general, TIVA ASA Status: 3          Anesthesia Type: No value filed.     Yen Phase I: Yen Score: 10    Yen Phase II:        Anesthesia Post Evaluation

## 2023-03-17 NOTE — PROGRESS NOTES
[address]   OCCUPATIONAL THERAPY MISSED TREATMENT NOTE   INPATIENT   Date: 3/17/23  Patient Name: Kimmy Hitchcock       Room: [facility]  MRN: [id number]   Account #: [de-identified]    : 1960  (63 y.o.)  Gender: male                 REASON FOR MISSED TREATMENT:  Patient at testing and/or off the floor   -    EGD. Will re-attempt as able and appropriate.       Electronically signed by STACEY Millard on 3/17/2023 at 2:07 PM

## 2023-03-17 NOTE — CONSULTS
Former     Packs/day: 1.00     Years: 45.00     Pack years: 45.00     Types: Cigarettes     Quit date: 2017     Years since quittin.1    Smokeless tobacco: Never   Vaping Use    Vaping Use: Never used   Substance and Sexual Activity    Alcohol use: Not Currently     Comment: Quit alcohol in 2019- heavier drinking prior to quitting    Drug use: Not Currently     Frequency: 1.0 times per week     Types: Cocaine     Comment: Cocaine- stopped spring 2016    Sexual activity: Yes     Partners: Female   Social History Narrative     in the past, retired     Social Determinants of Health     Financial Resource Strain: Low Risk     Difficulty of Paying Living Expenses: Not hard at all   Food Insecurity: No Food Insecurity    Worried About [address] in the Last Year: Never true    [address] in the Last Year: Never true   Physical Activity: Inactive    Days of Exercise per Week: 0 days    Minutes of Exercise per Session: 0 min        Family History:   Family History   Problem Relation Age of Onset    Cancer Mother         pancreatic    Cancer Father         bone    Diabetes Sister     Asthma Brother     Allergies Brother         MULTIPLE       REVIEW OF SYSTEMS:    Constitutional: there has been no unanticipated weight loss. There's been No change in energy level, No change in activity level. Eyes: No visual changes or diplopia. No scleral icterus. ENT: No Headaches, hearing loss or vertigo. No mouth sores or sore throat. Cardiovascular: As HPI  Respiratory: As HPI  Gastrointestinal: No abdominal pain, appetite loss, blood in stools. No change in bowel or bladder habits. Genitourinary: No dysuria, trouble voiding, or hematuria. Musculoskeletal:  No gait disturbance, No weakness or joint complaints. Integumentary: No rash or pruritis. Neurological: No headache, diplopia, change in muscle strength, numbness or tingling.  No change in gait, balance, coordination, mood, affect, memory,

## 2023-03-17 NOTE — PROGRESS NOTES
03/17/23 1411   Encounter Summary   Encounter Overview/Reason  Volunteer Encounter   Service Provided For: Patient   Referral/Consult From: Ralph   Last Encounter  03/17/23  (V)   Complexity of Encounter Low   Begin Time 1000   End Time  1015   Total Time Calculated 15 min   Assessment/Intervention/Outcome   Intervention Prayer (assurance of)/[place]

## 2023-03-17 NOTE — PROGRESS NOTES
upper extremity ([address] Adenocarcinoma in a polyp ([address] Alcoholic cirrhosis of liver with ascites ([address] Anemia, Anxiety, Arthritis, Back pain, chronic, Lezama esophagus, Bleeding gastric varices, BPH (benign prostatic hypertrophy), Cholelithiasis, Cirrhosis ([address] COVID-19, COVID-19 vaccine series completed, DDD (degenerative disc disease), lumbar, Depression, Esophageal cancer ([address] Esophageal varices ([address] Fatty liver, GERD (gastroesophageal reflux disease), GI bleed, Heart murmur, Hep C w/o coma, chronic ([address] Hiatal hernia, History of alcohol abuse, History of blood transfusion, History of colon polyps, History of tobacco abuse, [place] (hard of hearing), Hyperlipidemia, Hypertension, Hyponatremia, Hypotension, Mastoid disorder, bilateral, Pericardial effusion, PONV (postoperative nausea and vomiting), Poor venous access, Port-A-Cath in place, Portal hypertension ([address] Sciatica, Secondary esophageal varices ([address] Shortness of breath, Spinal stenosis, Stomach ulcer, Thrombocytopenia ([address] Tubular adenoma of colon, Vitamin D deficiency, and Wears glasses. Social History:   reports that he quit smoking about 6 years ago. His smoking use included cigarettes. He has a 45.00 pack-year smoking history. He has never used smokeless tobacco. He reports that he does not currently use alcohol. He reports that he does not currently use drugs after having used the following drugs: Cocaine. Frequency: 1.00 time per week.      Family History:   Family History   Problem Relation Age of Onset    Cancer Mother         pancreatic    Cancer Father         bone    Diabetes Sister     Asthma Brother     Allergies Brother         MULTIPLE       Vitals:  /60   Pulse 80   Temp 98.5 °F (36.9 °C) (Oral)   Resp 16   Ht 5' 10\" (1.778 m)   Wt 195 lb (88.5 kg)   SpO2 92%   BMI 27.98 kg/m²   Temp (24hrs), Av.4 °F (36.9 °C), Min:98 °F (36.7 °C), Max:99.3 °F (37.4 °C)    No results for input(s): POCGLU in the last 67 diet  Only wants to hydration, PPI drip, and Sandostatin drip for now mentions of abdominal goes below 7 g signs of bleeding that they will do endoscopy. We will repeat alpha-fetoprotein and imaging of the liver  CIWA protocol implemented due to concerns for alcohol withdrawal  Awaiting IR paracentesis and fluid studies      Roman Montero MD  3/17/2023  8:04 AM     Attending Physician Statement  I have discussed the care of Adrián Palomino with the resident team. I have examined the patient myself and taken ros and hpi , including pertinent history and exam findings,  with the resident. I have reviewed the key elements of all parts of the encounter with the resident. I agree with the assessment, plan and orders as documented by the resident. Principal Problem:    Anemia  Resolved Problems:    * No resolved hospital problems. *    58-year-old male admitted with rectal bleeding, melena  Prior history of alcoholic liver cirrhosis variceal bleeds, TIPS  GI was consulted patient underwent EGD today started on antibiotics Rocephin for SBP prophylaxis, IR guided paracentesis    Also new onset A-fib RVR-cardiology consulted, not on anticoagulation due to GI bleed      Recommending transfer to [facility] for ensuring patency of TIPS, possible need for embolization of gastric varices.      Currently hemodynamically stable        Electronically signed by Tom Mai MD

## 2023-03-17 NOTE — OP NOTE
PROCEDURE NOTE    DATE OF PROCEDURE: 3/17/2023     SURGEON: Devin Trejo MD  Facility: [facility]  ASSISTANT: None  Anesthesia: MAC  PREOPERATIVE DIAGNOSIS:   Hematemesis  Liver disease    Diagnosis: The mucosa of the distal esophagus is edematous and stained with blue color for some reason  There is a linear ulcer which probably where the patient most likely had argon treatment but there is no oozing from there or bleeding    On retroflex right at the GE junction there is swelling couple folds one of them had clotting fresh blood on it when we washed it he was using freshly and this is most likely the source of the bleeding  Looks like it could be a varix extending from the GE junction down for around 2 cm  For which we and because of the fresh oozing, I did not feel banding here will be the way to go in the stomach, I elected to close it with clips instead because injection also not can work and cautery is going to cause bleeding more if it is a varix, thankfully with 3 clips I was able to to accomplish hemostasis on it, I tried when I placed the clip not to go deep in the tissue and scratch the tissue, instead I elected to put the clip sideways and squeeze the tissue in between in cases of varix, (sclerotherapy medication is not available is not available in our hospital close parentheses      Portal hypertension gastropathy        POSTOPERATIVE DIAGNOSIS: As described below    OPERATION: Upper GI endoscopy with Biopsy    ANESTHESIA: Moderate Sedation     ESTIMATED BLOOD LOSS: Less than 50 ml    COMPLICATIONS: None. SPECIMENS:  Was Not Obtained    HISTORY: The patient is a 60y.o. year old male with history of above preop diagnosis. I recommended esophagogastroduodenoscopy with possible biopsy and I explained the risk, benefits, expected outcome, and alternatives to the procedure.   Risks included but are not limited to bleeding, infection, respiratory distress, hypotension, and perforation of the thankfully with 3 clips I was able to to accomplish hemostasis on it, I tried when I placed the clip not to go deep in the tissue and scratch the tissue, instead I elected to put the clip sideways and squeeze the tissue in between in cases of varix, (sclerotherapy medication is not available is not available in our hospital close parentheses      Portal hypertension gastropathy      Currently the stomach is empty with no fresh or old blood indicating active bleeding    The duodenum looked normal        The scope was removed and the patient tolerated the procedure well. Recommendations/Plan:    We need to make sure that the tips on this patient are patent for which it would be a good idea probably to transfer the patient to hospital where the IR can do that, and also in case the patient rebleed he would need embolization in that area in case it is a gastric varix, for both reasons transferring the patient to a place where there is IR backup for the above would be the the best for this patient  Meanwhile we will continue with Sandostatin drip  Continue with PPI drip  H&H monitoring  Correct coags aggressively  Platelets transfusion if needed      Electronically signed by Sheba Trujillo MD  on 3/17/2023 at 2:05 PM

## 2023-03-17 NOTE — ANESTHESIA PRE PROCEDURE
Department of Anesthesiology  Preprocedure Note       Name:  Talisha Rosa   Age:  60 y.o.  :  1960                                          MRN:  [id number]         Date:  3/17/2023      Surgeon: Kory Edwards):  Angelo Painting MD    Procedure: Procedure(s):  EGD BIOPSY    Medications prior to admission:   Prior to Admission medications    Medication Sig Start Date End Date Taking?  Authorizing Provider   lactulose (CHRONULAC) 10 GM/15ML solution take 30 milliliters by mouth three times a day 23   MASSIMO Ruvalcaba NP   spironolactone (ALDACTONE) 25 MG tablet Take 2 tablets by mouth daily 23   MASSIMO Ruvalcaba - NP   sucralfate (CARAFATE) 1 GM tablet Take 1 tablet by mouth 4 times daily 23   Lashae Vasquez MD   furosemide (LASIX) 40 MG tablet Take 1 tablet by mouth in the morning and 1 tablet in the evening. 23   Margot Rosales DO   pantoprazole (PROTONIX) 40 MG tablet Take 40 mg by mouth daily 10/4/22   Historical Provider, MD   FERWILD 325 (65 Fe) MG tablet take 1 tablet by mouth twice a day 10/7/22   VASU Wilde   atorvastatin (LIPITOR) 20 MG tablet Take 1 tablet by mouth nightly 22   Tess Chapa MD       Current medications:    Current Facility-Administered Medications   Medication Dose Route Frequency Provider Last Rate Last Admin    [facility] Hold] diphenhydrAMINE (BENADRYL) injection 25 mg  25 mg IntraVENous Q6H PRN MASSIMO Dalal - CNP   25 mg at 23 0454    [MAR Hold] promethazine (PHENERGAN) injection 25 mg  25 mg IntraMUSCular Q6H PRN MASSIMO Dalal CNP        [MAR Hold] sodium chloride flush 0.9 % injection 5-40 mL  5-40 mL IntraVENous 2 times per day Ciara Macias MD        [facility] Hold] sodium chloride flush 0.9 % injection 5-40 mL  5-40 mL IntraVENous PRN Mikal Obrien MD        [facility] Hold] 0.9 % sodium chloride infusion   IntraVENous PRN Ciara Macias MD        [facility] Hold] thiamine mononitrate tablet 100 mg  100 mg Oral Daily Darryn Francis MD   100 mg at 03/17/23 1054    [MAR Hold] metoprolol tartrate (LOPRESSOR) tablet 25 mg  25 mg Oral BID Yoni Katz DO        [MAR Hold] pantoprazole (PROTONIX) 40 mg in sodium chloride (PF) 0.9 % 10 mL injection  40 mg IntraVENous Q12H Ivan Gibson DO   40 mg at 03/17/23 1055    [MAR Hold] sodium chloride flush 0.9 % injection 5-40 mL  5-40 mL IntraVENous 2 times per day Radhika Mujica MD   10 mL at 03/16/23 2058    [MAR Hold] sodium chloride flush 0.9 % injection 5-40 mL  5-40 mL IntraVENous PRN Radhika Mujica MD        [facility] Hold] 0.9 % sodium chloride infusion   IntraVENous PRN Radhika Mujica MD 20 mL/hr at 03/16/23 1313 50 mL at 03/16/23 1313    [MAR Hold] polyethylene glycol (GLYCOLAX) packet 17 g  17 g Oral Daily PRN Radhika Mujica MD        [facility] Hold] acetaminophen (TYLENOL) tablet 650 mg  650 mg Oral Q6H PRN Radhika Mujica MD   650 mg at 03/16/23 1300    Or    [MAR Hold] acetaminophen (TYLENOL) suppository 650 mg  650 mg Rectal Q6H PRN Radhika Mujica MD        [facility] Hold] potassium chloride 10 mEq/100 mL IVPB (Peripheral Line)  10 mEq IntraVENous PRN Radhika Mujica MD        [facility] Hold] magnesium sulfate 2000 mg in water 50 mL IVPB  2,000 mg IntraVENous PRN Radhika Cohen MD   Stopped at 03/16/23 1515    [MAR Hold] atorvastatin (LIPITOR) tablet 20 mg  20 mg Oral Nightly Radhika Mujica MD   20 mg at 03/16/23 2039    [MAR Hold] ferrous sulfate (IRON 325) tablet 325 mg  325 mg Oral Daily with breakfast Radhika Mujica MD   325 mg at 03/17/23 1054    [MAR Hold] furosemide (LASIX) tablet 40 mg  40 mg Oral BID Radhika Mujica MD   40 mg at 03/17/23 1054    [MAR Hold] spironolactone (ALDACTONE) tablet 50 mg  50 mg Oral Daily Radhika Cohen MD   50 mg at 03/17/23 1055    [facility] Hold] lactulose (CHRONULAC) 10 GM/15ML solution 20 g  20 g Oral TID Radhika Phan MD   20 g at 03/17/23 1055    [MAR Hold] cefTRIAXone (ROCEPHIN) 1,000 mg in sodium chloride 0.9 % 50 mL IVPB (mini-bag)  1,000 mg IntraVENous Q24H rKys George MD   Stopped at 03/16/23 1805    [MAR Hold] morphine (PF) injection 1 mg  1 mg IntraVENous Q4H PRN Krys George MD   1 mg at 03/17/23 0041    [MAR Hold] metoprolol (LOPRESSOR) injection 5 mg  5 mg IntraVENous Q6H PRN Radhika Phan MD        [facility] Hold] octreotide (SANDOSTATIN) 500 mcg in sodium chloride 0.9 % 100 mL infusion  50 mcg/hr IntraVENous Continuous Lucian Harley MD 10 mL/hr at 03/17/23 0604 50 mcg/hr at 03/17/23 0604       Allergies:  No Known Allergies    Problem List:    Patient Active Problem List   Diagnosis Code    Back pain, chronic M54.9, G89.29    Hearing difficulty H91.90    GERD (gastroesophageal reflux disease) K21.9    Cervical radicular pain M54.12    Hypomagnesemia E83.42    Chronic viral hepatitis B without delta agent and without coma (HCC) B18.1    Calculus of gallbladder without cholecystitis K80.20    Hep C w/o coma, chronic (HCC) B18.2    Fatty liver K76.0    Psychophysiologic insomnia F51.04    Cirrhosis (HCC) K74.60    Decompensation of cirrhosis of liver (HCC) K72.90, K74.60    Vertebrogenic low back pain M54.51    DDD (degenerative disc disease), lumbar M51.36    Depression F32. A    Tubular adenoma of colon D12.6    History of colon polyps Z86.010    Gynecomastia, male N58    Lumbar radiculitis M54.16    Lumbar disc herniation M51.26    Tinnitus H93.19    Eustachian tube dysfunction H69.80    Ganglion cyst M67.40    Carpal tunnel syndrome of right wrist G56.01    History of hepatitis C Z86.19    Vitamin D deficiency E55.9    Pure hypercholesterolemia E78.00    Acute hypokalemia E87.6    Essential hypertension I10    Recurrent major depressive disorder in partial remission (HCC) F33.41    S/P epidural steroid injection Z92.241    Elevated LFTs R79.89    Seasonal allergies J30.2    Lumbar facet arthropathy M47.816    Cervical facet syndrome M47.812    Thrombocytopenia (HCC) D69.6    Hepatitis C virus infection resolved after antiviral drug therapy Z86.19    Alcohol abuse F10.10    Altered mental status R41.82    Hypocalcemia E83.51    Hypophosphatemia E83.39    Malignant neoplasm of lower third of esophagus (HCC) C15.5    COVID-19 U07.1    Anxiety F41.9    Current smoker F17.200    Severe comorbid illness R69    Gait instability R26.81    Abnormal findings on diagnostic imaging of spine R93.7    Cervical spinal stenosis M48.02    Spinal stenosis of lumbar region with neurogenic claudication M48.062    Low hemoglobin D64.9    Symptomatic anemia, microcytic, acute D64.9    Hypotension I95.9    Former smoker, 50+ pack years, quit 2016 Z87.891    HLD (hyperlipidemia) E78.5    Esophageal adenocarcinoma (HCC) C15.9    Anemia D64.9    Acute kidney injury ([facility] N17.9    Ascites due to alcoholic cirrhosis (HCC) P39.02    Shortness of breath R06.02    Drop in hemoglobin R71.0    Esophageal polyp K22.81    Acute kidney failure, unspecified (HCC) N17.9    Muscle weakness (generalized) M62.81    Other abnormalities of gait and mobility R26.89    GI bleed K92.2    Goals of care, counseling/discussion Z71.89    ACP (advance care planning) Z71.89    Palliative care encounter Z51.5    S/P TIPS (transjugular intrahepatic portosystemic shunt) Z95.828    Lezama's esophagus with dysplasia K22.719    Mastoid disorder, bilateral H74.93    Acute deep vein thrombosis (DVT) of brachial vein of right upper extremity (HCC) I82.621    Chronic hepatitis C without hepatic coma (HCC) B18.2    Swelling of joint of upper arm, right M25.421    Anasarca R60.1       Past Medical History:        Diagnosis Date    Abnormal EKG     Acute deep vein thrombosis (DVT) of brachial vein of right upper extremity ([address] 01/07/2023    Also- Superficial venous thrombosis of the cephalic vein in the forearm    Adenocarcinoma in a polyp ([address]     Alcoholic cirrhosis of liver with ascites ([address]     Anemia 04/13/2022    Anxiety     Arthritis     Back pain, chronic     dr. Ana Webb, orthopedic, every 3-4 months, gets steroid injection    Lezama esophagus     Bleeding gastric varices 12/29/2022    BPH (benign prostatic hypertrophy)     Cholelithiasis     Cirrhosis ([address]     COVID-19 12/2020    pt reports he had a positive test while at [facility] in 2020, was asymptomatic    COVID-19 vaccine series completed 5/20/2021, 6/22/2021    Moderna 5/20/2021, 6/22/2021    DDD (degenerative disc disease), lumbar     Depression     Esophageal cancer ([address]     INVASIVE ADENOCARCINOMA ARISING IN TUBULAR ADENOMA WITH HIGH GRADE DYSPLASIA, ASSOCIATED WITH FOCAL INTESTINAL METAPLASIA     Esophageal varices ([address]     Fatty liver     GERD (gastroesophageal reflux disease)     GI bleed     Heart murmur     Hep C w/o coma, chronic ([address]     Hiatal hernia     History of alcohol abuse     6-12 beers a day; quit drinking 2019    History of blood transfusion     History of colon polyps 2016    History of tobacco abuse     [place] (hard of hearing)     Hyperlipidemia     Hypertension     Hyponatremia 07/20/2016    Hypotension 12/20/2021    Mastoid disorder, bilateral 12/31/2022    biLateral mastoid effusion    Pericardial effusion     PONV (postoperative nausea and vomiting)     Poor venous access     has port in place.     Port-A-Cath in place     right upper chest    Portal hypertension ([address]     Sciatica     Secondary esophageal varices (HCC) 06/07/2022    Shortness of breath     Spinal stenosis     Stomach ulcer     hx of    Thrombocytopenia ([address] 12/23/2020    Tubular adenoma of colon 2016, 2018    Vitamin D deficiency     Wears glasses        Past Surgical History:        Procedure Laterality Date    BUNIONECTOMY      twice on right side    BUNIONECTOMY Left     CARPAL TUNNEL RELEASE Right     COLONOSCOPY      at age 35    COLONOSCOPY  10/05/2016    polyps-pathology tubular adenoma, and abnormal looking mucosa right colon-pathology-tubular adenoma    COLONOSCOPY N/A 03/30/2018    COLONOSCOPY POLYPECTOMY COLD BIOPSY performed by Jessica Carver MD at [address] [place] [address]  03/30/2018    Small polyp in the sigmoid colon and excised with biopsy forceps--tubular adenoma    COLONOSCOPY N/A 04/16/2022    COLONOSCOPY POLYPECTOMY performed by Jessica Carver MD at [address] [place] [address], COLON, DIAGNOSTIC      EGD    ESOPHAGOGASTRODUODENOSCOPY  12/29/2022    ESOPHAGOGASTRODUODENOSCOPY N/A 01/03/2023    IR PORT PLACEMENT EQUAL OR GREATER THAN 5 YEARS  04/19/2021    IR PORT PLACEMENT EQUAL OR GREATER THAN 5 YEARS 4/19/2021 [facility]    IR TIPS INSERTION  12/01/2022    IR TIPS INSERTION 12/1/2022 Batsheva Venegas MD [facility]    KNEE SURGERY Left     cyst removed    NASAL SEPTUM SURGERY      NERVE BLOCK Right 11/23/2020    NERVE BLOCK RIGHT CERVICAL STEROID INJECTION  C3-C6 performed by Eduardo Culp MD at [address] [place] [address]  01/04/2016    steroid injection C7 T1    OTHER SURGICAL HISTORY  11/21/2016    Bilateral Lumbar CACHORRO L4-L5 injections    OTHER SURGICAL HISTORY  12/19/2016    lumbar steroid injection    OTHER SURGICAL HISTORY  09/28/2018    BILATERAL L5 CACHORRO (N/A Back)    OTHER SURGICAL HISTORY Right 11/23/2020    cervical injection    PAIN MANAGEMENT PROCEDURE Left 07/09/2020    EPIDURAL STEROID INJECTION LEFT L4 L5 performed by Eduardo Culp MD at [address] [place] [address] Left 07/20/2020    LEFT L4 L5 EPIDURAL STEROID INJECTION performed by Eduardo Culp MD at [address] [place] [address] Bilateral 08/17/2020    LUMBAR FACET BILATERAL L2-L5 performed by Eduardo Culp MD at [address] Bilateral 12/07/2020    NERVE BLOCK BILATERAL LUMBAR MEDIAL BRANCH L2-L5 performed by Thiago Hester MD at [address]      Multiple    SC NEUROPLASTY &/TRANSPOS MEDIAN NRV CARPAL TUNNE Right 08/29/2017    CARPAL TUNNEL RELEASE RIGHT performed by Evelin Kimble MD at [facility] &/TRANSPOS MEDIAN NRV CARPAL TUNNE Left 10/31/2017    CARPAL TUNNEL RELEASE performed by Evelin Kimble MD at [facility] AA&/STRD TFRML EPI LUMBAR/SACRAL 1 LEVEL Bilateral 09/06/2018    BILATERAL L5 CACHORRO performed by Thiago Hester MD at [facility] AA&/STRD TFRML EPI LUMBAR/SACRAL 1 LEVEL N/A 09/28/2018    BILATERAL L5 CACHORRO performed by Thiago Hester MD at [address] [place] [address] N/A 12/29/2020    EGD BIOPSY performed by Yuly Syed MD at [address] [place] [address] 02/02/2021    EGD BIOPSY and spot marking performed by Phil Naranjo MD at [address] [place] [address] 02/12/2021    ENDOSCOPIC ULTRASOUND, EGD performed by Jian Murillo MD at [place] [address]  02/12/2021    EGD DIAGNOSTIC ONLY performed by Jian Murillo MD at [facility] N/A 08/31/2021    EGD BIOPSY performed by Phil Naranjo MD at [address] [place] [address] 01/21/2022    EGD BIOPSY performed by Phil Naranjo MD at [address] [facility] N/A 04/15/2022    EGD ESOPHAGOGASTRODUODENOSCOPY performed by Yuly Syed MD at [address] 06/06/2022    EGD BAND LIGATION performed by Opal Terry MD at [address] N/A 06/09/2022    EGD ESOPHAGOGASTRODUODENOSCOPY performed by Opal Terry MD at [address] N/A 09/14/2022    EGD ESOPHAGOGASTRODUODENOSCOPY ENDOSCOPIC APC AT GE JUNCTION performed by Josephine Luz MD at [address] [place] [address] 10/19/2022    EGD BIOPSY performed by Marietta Munoz MD at [address] [place] [address] 10/31/2022    EGD with APC performed by Marietta Munoz MD at [address] [place] [address]  2022    EGD ESOPHAGOGASTRODUODENOSCOPY performed by Rosey Marmolejo MD at [address] [place] [address] 2023    EGD ESOPHAGOGASTRODUODENOSCOPY performed by Liz Amador MD at [address] [place] [address] 2023    EGD CONTROL HEMORRHAGE performed by Marietta Munoz MD at [address] [place] [address] N/A 2023    EGD WITH APC GOLD PROBE performed by Marietta Munoz MD at [facility] History:    Social History     Tobacco Use    Smoking status: Former     Packs/day: 1.00     Years: 45.00     Pack years: 45.00     Types: Cigarettes     Quit date: 2017     Years since quittin.1    Smokeless tobacco: Never   Substance Use Topics    Alcohol use: Not Currently     Comment: Quit alcohol in 2019- heavier drinking prior to quitting                                Counseling given: Not Answered      Vital Signs (Current):   Vitals:    23 2340 23 0628 23 1228 23 1239   BP: 138/71 135/60 135/69 137/73   Pulse: 95 80 (!) 115 99   Resp:    Temp: 98 °F (36.7 °C) 98.5 °F (36.9 °C) 98.4 °F (36.9 °C) 97.7 °F (36.5 °C)   TempSrc: Oral Oral Oral Infrared   SpO2: 93% 92% 94% 94%   Weight:    195 lb (88.5 kg)   Height:    5' 10\" (1.778 m)                                              BP Readings from Last 3 Encounters:   23 137/73   23 138/80   23 135/78       NPO Status:                                                                                 BMI:   Wt Readings from Last 3 Encounters:   23 195 lb (88.5 kg)   03/03/23 200 lb (90.7 kg)   02/21/23 204 lb 4.8 oz (92.7 kg)     Body mass index is 27.98 kg/m². CBC:   Lab Results   Component Value Date/Time    WBC 3.9 03/17/2023 05:06 AM    RBC 3.38 03/17/2023 05:06 AM    RBC 4.75 04/19/2012 11:30 AM    HGB 7.3 03/17/2023 05:06 AM    HCT 24.7 03/17/2023 05:06 AM    MCV 73.2 03/17/2023 05:06 AM    RDW 19.0 03/17/2023 05:06 AM    PLT 72 03/17/2023 05:06 AM     04/19/2012 11:30 AM       CMP:   Lab Results   Component Value Date/Time     03/17/2023 05:06 AM    K 3.6 03/17/2023 05:06 AM     03/17/2023 05:06 AM    CO2 26 03/17/2023 05:06 AM    BUN 10 03/17/2023 05:06 AM    CREATININE 0.62 03/17/2023 05:06 AM    GFRAA >60 09/28/2022 01:33 PM    LABGLOM >60 03/17/2023 05:06 AM    GLUCOSE 135 03/17/2023 05:06 AM    GLUCOSE 65 04/19/2012 11:30 AM    PROT 6.4 03/16/2023 09:00 AM    CALCIUM 8.4 03/17/2023 05:06 AM    BILITOT 2.1 03/16/2023 09:00 AM    ALKPHOS 165 03/16/2023 09:00 AM    AST 51 03/16/2023 09:00 AM    ALT 11 03/16/2023 09:00 AM       POC Tests: No results for input(s): POCGLU, POCNA, POCK, POCCL, POCBUN, POCHEMO, POCHCT in the last 72 hours.     Coags:   Lab Results   Component Value Date/Time    PROTIME 17.6 03/16/2023 09:28 AM    INR 1.4 03/16/2023 09:28 AM    APTT 33.2 02/24/2023 12:34 PM       HCG (If Applicable): No results found for: PREGTESTUR, PREGSERUM, HCG, HCGQUANT     ABGs: No results found for: PHART, PO2ART, QPW4NEJ, OYO4UEC, BEART, B9IJVNOZ     Type & Screen (If Applicable):  No results found for: LABABO, LABRH    Drug/Infectious Status (If Applicable):  Lab Results   Component Value Date/Time    HEPCAB REACTIVE 12/23/2020 05:09 PM       COVID-19 Screening (If Applicable):   Lab Results   Component Value Date/Time    COVID19 Not Detected 09/12/2022 10:32 PM    COVID19 Not Detected 07/17/2020 10:00 AM           Anesthesia Evaluation  Patient summary reviewed and Nursing notes reviewed   history of anesthetic complications: PONV.  Airway: Mallampati: II  TM distance: >3 FB   Neck ROM: full  Mouth opening: > = 3 FB   Dental:    (+) poor dentition      Pulmonary: breath sounds clear to auscultation  (+) shortness of breath: chronic,  asthma:                            Cardiovascular:    (+) hypertension:,       ECG reviewed  Rhythm: regular  Rate: normal  Echocardiogram reviewed               ROS comment: Normal left ventricle size and function with an estimated EF 55-60%. No segmental wall motion abnormalities seen. Mild left ventricular hypertrophy. Normal diastolic filling. Right ventricular dilatation with normal systolic function. Mild mitral regurgitation. Normal right ventricular systolic pressure. Small posterior pericardial effusion.        Neuro/Psych:   (+) neuromuscular disease:, psychiatric history:            GI/Hepatic/Renal:   (+) hiatal hernia, GERD:, PUD, hepatitis: C, liver disease:,          ROS comment: Hematemesis. Endo/Other:    (+) blood dyscrasia: anemia and thrombocytopenia:., .                 Abdominal:             Vascular: Other Findings:           Anesthesia Plan      general and TIVA     ASA 3       Induction: intravenous. Anesthetic plan and risks discussed with patient. Plan discussed with CRNA.                     Niko Serrato MD   3/17/2023

## 2023-03-17 NOTE — PROGRESS NOTES
RN notified of GI recommendation of transfer to [facility]. Vs to Resident team, they will let resident for the patient know.

## 2023-03-17 NOTE — PLAN OF CARE
Pt reports continued nausea, states he had one episode of maroon colored emesis. Denies abdominal pain, no further emesis today, no bm overnight. D/w dr Ramila Kennedy will plan for egd today. Keep npo D/w primary rn . Donna Gonzalez, APRN - CNP

## 2023-03-17 NOTE — PROGRESS NOTES
RN asked Resident for orders to access patient's port. Second IV administered by Caroline Fournier, but patient requesting port access. Awaiting orders.

## 2023-03-17 NOTE — PROGRESS NOTES
Pt c/o \"itching all over\" after receiving IV Zofran. Pt stated he had a similar episode after receiving Zofran earlier in the day. No signs of oropharyngeal swelling and no c/o itching in the mouth. Perfect Served Jasmin Schmitt, MARIAELENA - see new orders.

## 2023-03-18 LAB
ABSOLUTE EOS #: 0.08 K/UL (ref 0–0.4)
ABSOLUTE LYMPH #: 0.45 K/UL (ref 1–4.8)
ABSOLUTE MONO #: 0.74 K/UL (ref 0.1–1.3)
ANION GAP SERPL CALCULATED.3IONS-SCNC: 12 MMOL/L (ref 9–17)
BASOPHILS # BLD: 1 % (ref 0–2)
BASOPHILS ABSOLUTE: 0.04 K/UL (ref 0–0.2)
BUN SERPL-MCNC: 12 MG/DL (ref 8–23)
CALCIUM SERPL-MCNC: 8.3 MG/DL (ref 8.6–10.4)
CHLORIDE SERPL-SCNC: 100 MMOL/L (ref 98–107)
CO2 SERPL-SCNC: 25 MMOL/L (ref 20–31)
CREAT SERPL-MCNC: 0.75 MG/DL (ref 0.7–1.2)
EOSINOPHILS RELATIVE PERCENT: 2 % (ref 0–4)
GFR SERPL CREATININE-BSD FRML MDRD: >60 ML/MIN/1.73M2
GLUCOSE SERPL-MCNC: 114 MG/DL (ref 70–99)
HCT VFR BLD AUTO: 24 % (ref 41–53)
HCT VFR BLD AUTO: 24.5 % (ref 41–53)
HCT VFR BLD AUTO: 24.6 % (ref 41–53)
HGB BLD-MCNC: 7 G/DL (ref 13.5–17.5)
HGB BLD-MCNC: 7.3 G/DL (ref 13.5–17.5)
HGB BLD-MCNC: 7.4 G/DL (ref 13.5–17.5)
LYMPHOCYTES # BLD: 11 % (ref 24–44)
MAGNESIUM SERPL-MCNC: 1.3 MG/DL (ref 1.6–2.6)
MCH RBC QN AUTO: 22 PG (ref 26–34)
MCHC RBC AUTO-ENTMCNC: 30.2 G/DL (ref 31–37)
MCV RBC AUTO: 72.6 FL (ref 80–100)
MONOCYTES # BLD: 18 % (ref 1–7)
MORPHOLOGY: ABNORMAL
PDW BLD-RTO: 19.3 % (ref 11.5–14.9)
PLATELET # BLD AUTO: 83 K/UL (ref 150–450)
PMV BLD AUTO: 7.4 FL (ref 6–12)
POTASSIUM SERPL-SCNC: 3.1 MMOL/L (ref 3.7–5.3)
RBC # BLD: 3.38 M/UL (ref 4.5–5.9)
SEG NEUTROPHILS: 68 % (ref 36–66)
SEGMENTED NEUTROPHILS ABSOLUTE COUNT: 2.79 K/UL (ref 1.3–9.1)
SODIUM SERPL-SCNC: 137 MMOL/L (ref 135–144)
WBC # BLD AUTO: 4.1 K/UL (ref 3.5–11)

## 2023-03-18 PROCEDURE — 99232 SBSQ HOSP IP/OBS MODERATE 35: CPT | Performed by: INTERNAL MEDICINE

## 2023-03-18 PROCEDURE — 85018 HEMOGLOBIN: CPT

## 2023-03-18 PROCEDURE — 80048 BASIC METABOLIC PNL TOTAL CA: CPT

## 2023-03-18 PROCEDURE — 2580000003 HC RX 258: Performed by: INTERNAL MEDICINE

## 2023-03-18 PROCEDURE — 99233 SBSQ HOSP IP/OBS HIGH 50: CPT | Performed by: INTERNAL MEDICINE

## 2023-03-18 PROCEDURE — 85014 HEMATOCRIT: CPT

## 2023-03-18 PROCEDURE — 2500000003 HC RX 250 WO HCPCS: Performed by: INTERNAL MEDICINE

## 2023-03-18 PROCEDURE — 85025 COMPLETE CBC W/AUTO DIFF WBC: CPT

## 2023-03-18 PROCEDURE — 83735 ASSAY OF MAGNESIUM: CPT

## 2023-03-18 PROCEDURE — C9113 INJ PANTOPRAZOLE SODIUM, VIA: HCPCS | Performed by: NURSE PRACTITIONER

## 2023-03-18 PROCEDURE — 6360000002 HC RX W HCPCS: Performed by: NURSE PRACTITIONER

## 2023-03-18 PROCEDURE — 6360000002 HC RX W HCPCS: Performed by: INTERNAL MEDICINE

## 2023-03-18 PROCEDURE — 6370000000 HC RX 637 (ALT 250 FOR IP): Performed by: NURSE PRACTITIONER

## 2023-03-18 PROCEDURE — 2060000000 HC ICU INTERMEDIATE R&B

## 2023-03-18 PROCEDURE — 36415 COLL VENOUS BLD VENIPUNCTURE: CPT

## 2023-03-18 PROCEDURE — 2580000003 HC RX 258: Performed by: NURSE PRACTITIONER

## 2023-03-18 RX ORDER — LORAZEPAM 1 MG/1
1 TABLET ORAL
Status: DISCONTINUED | OUTPATIENT
Start: 2023-03-18 | End: 2023-03-19

## 2023-03-18 RX ORDER — LORAZEPAM 1 MG/1
2 TABLET ORAL
Status: DISCONTINUED | OUTPATIENT
Start: 2023-03-18 | End: 2023-03-19

## 2023-03-18 RX ORDER — SODIUM CHLORIDE 0.9 % (FLUSH) 0.9 %
5-40 SYRINGE (ML) INJECTION EVERY 12 HOURS SCHEDULED
Status: DISCONTINUED | OUTPATIENT
Start: 2023-03-18 | End: 2023-03-25 | Stop reason: HOSPADM

## 2023-03-18 RX ORDER — LORAZEPAM 2 MG/ML
2 INJECTION INTRAMUSCULAR
Status: DISCONTINUED | OUTPATIENT
Start: 2023-03-18 | End: 2023-03-19

## 2023-03-18 RX ORDER — LORAZEPAM 1 MG/1
3 TABLET ORAL
Status: DISCONTINUED | OUTPATIENT
Start: 2023-03-18 | End: 2023-03-19

## 2023-03-18 RX ORDER — LORAZEPAM 2 MG/ML
3 INJECTION INTRAMUSCULAR
Status: DISCONTINUED | OUTPATIENT
Start: 2023-03-18 | End: 2023-03-19

## 2023-03-18 RX ORDER — SODIUM CHLORIDE 0.9 % (FLUSH) 0.9 %
5-40 SYRINGE (ML) INJECTION PRN
Status: DISCONTINUED | OUTPATIENT
Start: 2023-03-18 | End: 2023-03-25 | Stop reason: HOSPADM

## 2023-03-18 RX ORDER — LORAZEPAM 1 MG/1
4 TABLET ORAL
Status: DISCONTINUED | OUTPATIENT
Start: 2023-03-18 | End: 2023-03-19

## 2023-03-18 RX ORDER — SODIUM CHLORIDE 9 MG/ML
INJECTION, SOLUTION INTRAVENOUS PRN
Status: DISCONTINUED | OUTPATIENT
Start: 2023-03-18 | End: 2023-03-25 | Stop reason: HOSPADM

## 2023-03-18 RX ORDER — LORAZEPAM 2 MG/ML
4 INJECTION INTRAMUSCULAR
Status: DISCONTINUED | OUTPATIENT
Start: 2023-03-18 | End: 2023-03-19

## 2023-03-18 RX ORDER — LORAZEPAM 2 MG/ML
1 INJECTION INTRAMUSCULAR
Status: DISCONTINUED | OUTPATIENT
Start: 2023-03-18 | End: 2023-03-19

## 2023-03-18 RX ADMIN — POTASSIUM CHLORIDE 10 MEQ: 7.46 INJECTION, SOLUTION INTRAVENOUS at 10:35

## 2023-03-18 RX ADMIN — POTASSIUM CHLORIDE 10 MEQ: 7.46 INJECTION, SOLUTION INTRAVENOUS at 11:54

## 2023-03-18 RX ADMIN — PANTOPRAZOLE SODIUM 8 MG/HR: 40 INJECTION, POWDER, FOR SOLUTION INTRAVENOUS at 14:27

## 2023-03-18 RX ADMIN — LORAZEPAM 4 MG: 2 INJECTION INTRAMUSCULAR; INTRAVENOUS at 14:18

## 2023-03-18 RX ADMIN — Medication 3 MG: at 00:24

## 2023-03-18 RX ADMIN — LORAZEPAM 4 MG: 2 INJECTION INTRAMUSCULAR; INTRAVENOUS at 06:49

## 2023-03-18 RX ADMIN — LORAZEPAM 4 MG: 2 INJECTION INTRAMUSCULAR; INTRAVENOUS at 08:46

## 2023-03-18 RX ADMIN — CEFTRIAXONE SODIUM 1000 MG: 1 INJECTION, POWDER, FOR SOLUTION INTRAMUSCULAR; INTRAVENOUS at 17:23

## 2023-03-18 RX ADMIN — OCTREOTIDE ACETATE 50 MCG/HR: 500 INJECTION, SOLUTION INTRAVENOUS; SUBCUTANEOUS at 15:53

## 2023-03-18 RX ADMIN — POTASSIUM CHLORIDE 10 MEQ: 7.46 INJECTION, SOLUTION INTRAVENOUS at 08:38

## 2023-03-18 RX ADMIN — MAGNESIUM SULFATE HEPTAHYDRATE 2000 MG: 40 INJECTION, SOLUTION INTRAVENOUS at 08:52

## 2023-03-18 RX ADMIN — LORAZEPAM 1 MG: 2 INJECTION INTRAMUSCULAR; INTRAVENOUS at 05:52

## 2023-03-18 RX ADMIN — LORAZEPAM 3 MG: 2 INJECTION INTRAMUSCULAR; INTRAVENOUS at 23:12

## 2023-03-18 RX ADMIN — POTASSIUM CHLORIDE 10 MEQ: 7.46 INJECTION, SOLUTION INTRAVENOUS at 14:30

## 2023-03-18 RX ADMIN — LORAZEPAM 2 MG: 2 INJECTION INTRAMUSCULAR; INTRAVENOUS at 05:30

## 2023-03-18 RX ADMIN — LORAZEPAM 2 MG: 2 INJECTION INTRAMUSCULAR; INTRAVENOUS at 04:13

## 2023-03-18 RX ADMIN — MAGNESIUM SULFATE HEPTAHYDRATE 2000 MG: 40 INJECTION, SOLUTION INTRAVENOUS at 10:36

## 2023-03-18 RX ADMIN — OCTREOTIDE ACETATE 50 MCG/HR: 500 INJECTION, SOLUTION INTRAVENOUS; SUBCUTANEOUS at 04:54

## 2023-03-18 RX ADMIN — LORAZEPAM 4 MG: 2 INJECTION INTRAMUSCULAR; INTRAVENOUS at 17:26

## 2023-03-18 RX ADMIN — PANTOPRAZOLE SODIUM 8 MG/HR: 40 INJECTION, POWDER, FOR SOLUTION INTRAVENOUS at 21:19

## 2023-03-18 RX ADMIN — PANTOPRAZOLE SODIUM 8 MG/HR: 40 INJECTION, POWDER, FOR SOLUTION INTRAVENOUS at 04:51

## 2023-03-18 NOTE — PROGRESS NOTES
Walked into room and patient was very confused. He state, \"You switched me in another room and brought this white board with me. \" Patients hands were very shaky. Also has a history of alcohol abuse. RN notified Miladys Tracey NP and CIWA scale was placed. See orders.

## 2023-03-18 NOTE — PROGRESS NOTES
tenderness in the calves  Skin: no gross lesions, rashes, or induration    Assessment:        Primary Problem  Anemia     Active Hospital Problems    Diagnosis Date Noted    Lightheadedness [R42] 03/17/2023     Priority: Medium    Anemia [D64.9] 04/13/2022     Past Medical History:   Diagnosis Date    Abnormal EKG     Acute deep vein thrombosis (DVT) of brachial vein of right upper extremity ([address] 01/07/2023    Also- Superficial venous thrombosis of the cephalic vein in the forearm    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 Rola Noble, orthopedic, every 3-4 months, gets steroid injection    Lezama esophagus     Bleeding gastric varices 12/29/2022    BPH (benign prostatic hypertrophy)     Cholelithiasis     Cirrhosis ([address]     COVID-19 12/2020    pt reports he had a positive test while at [facility] in 2020, was asymptomatic    COVID-19 vaccine series completed 5/20/2021, 6/22/2021    Moderna 5/20/2021, 6/22/2021    DDD (degenerative disc disease), lumbar     Depression     Esophageal cancer ([address]     INVASIVE ADENOCARCINOMA ARISING IN TUBULAR ADENOMA WITH HIGH GRADE DYSPLASIA, ASSOCIATED WITH FOCAL INTESTINAL METAPLASIA     Esophageal varices ([address]     Fatty liver     GERD (gastroesophageal reflux disease)     GI bleed     Heart murmur     Hep C w/o coma, chronic ([address]     Hiatal hernia     History of alcohol abuse     6-12 beers a day; quit drinking 2019    History of blood transfusion     History of colon polyps 2016    History of tobacco abuse     [place] (hard of hearing)     Hyperlipidemia     Hypertension     Hyponatremia 07/20/2016    Hypotension 12/20/2021    Mastoid disorder, bilateral 12/31/2022    biLateral mastoid effusion    Pericardial effusion     PONV (postoperative nausea and vomiting)     Poor venous access     has port in place.     Port-A-Cath in place     right upper chest    Portal hypertension ([address]     Sciatica     Secondary esophageal varices ([address] 06/07/2022    Shortness of breath     Spinal stenosis     Stomach ulcer     hx of    Thrombocytopenia ([address] 12/23/2020    Tubular adenoma of colon 2016, 2018    Vitamin D deficiency     Wears glasses         Plan:        Anemia s/p EGD with swelling and bleeding at GE junction, possible varix, was clipped x 3  No overt bleeding  Hgb stable, 7.6  Was planning on transfer to [facility] for evaluation of TIPS  Unfortunately, patient in EtOH withdrawal at present time  Continue Sandostatin  IVP PPI BID  Monitor for bleeding  Trend H&H  Transfuse for hgb < 7  Okay for clears if he passes bedside swallow    Time spent reviewing the chart, seeing the patient, and discussing with the attending MD around 30 minutes. Explained to the patient and d/W Nursing Staff  Will F/U with you  Please call or Page for any issues or change in status  Thanks    This note is created with the assistance of the speech recognition program.  While intending to generate a document that actually reflects the content of the visit, document can still have some errors including those of syntax and sound like substitutions which may escape proof reading. Actual meaning can be extrapolated by contextual diversion. Electronically signed by MASSIMO Dorman NP on 3/18/2023 at 1:57 PM       GI attending physician note. Patient seen with MASSIMO    I independently performed an evaluation on Shavonne Keys. I have reviewed the above documentation completed by the Nurse Practitioner and agree with the history, examination, and management plan. Recommendations discussed. Patient is more awake today. Still appears to be confused. He is oriented to person and place. Has mild asterixis. Abdominal examination benign. No signs of overt bleeding. Recommendations: To keep on 2 g sodium diet. Continue management of DTs.

## 2023-03-18 NOTE — PROGRESS NOTES
Bleeding gastric varices, BPH (benign prostatic hypertrophy), Cholelithiasis, Cirrhosis ([address] COVID-19, COVID-19 vaccine series completed, DDD (degenerative disc disease), lumbar, Depression, Esophageal cancer ([address] Esophageal varices ([address] Fatty liver, GERD (gastroesophageal reflux disease), GI bleed, Heart murmur, Hep C w/o coma, chronic ([address] Hiatal hernia, History of alcohol abuse, History of blood transfusion, History of colon polyps, History of tobacco abuse, [place] (hard of hearing), Hyperlipidemia, Hypertension, Hyponatremia, Hypotension, Mastoid disorder, bilateral, Pericardial effusion, PONV (postoperative nausea and vomiting), Poor venous access, Port-A-Cath in place, Portal hypertension ([address] Sciatica, Secondary esophageal varices ([address] Shortness of breath, Spinal stenosis, Stomach ulcer, Thrombocytopenia ([address] Tubular adenoma of colon, Vitamin D deficiency, and Wears glasses. Social History:   reports that he quit smoking about 6 years ago. His smoking use included cigarettes. He has a 45.00 pack-year smoking history. He has never used smokeless tobacco. He reports that he does not currently use alcohol. He reports that he does not currently use drugs after having used the following drugs: Cocaine. Frequency: 1.00 time per week. Family History:   Family History   Problem Relation Age of Onset    Cancer Mother         pancreatic    Cancer Father         bone    Diabetes Sister     Asthma Brother     Allergies Brother         MULTIPLE       Vitals:  /69   Pulse 76   Temp 99.6 °F (37.6 °C) (Axillary)   Resp 20   Ht 5' 10\" (1.778 m)   Wt 189 lb 14.4 oz (86.1 kg)   SpO2 95%   BMI 27.25 kg/m²   Temp (24hrs), Av.8 °F (37.1 °C), Min:97.7 °F (36.5 °C), Max:99.6 °F (37.6 °C)    No results for input(s): POCGLU in the last 72 hours. I/O(24Hr):     Intake/Output Summary (Last 24 hours) at 3/18/2023 0900  Last data filed at 3/18/2023 0157  Gross per 24 hour   Intake 900 ml   Output 350 Sandostatin drip for now mentions of abdominal goes below 7 g signs of bleeding that they will do endoscopy. We will repeat alpha-fetoprotein and imaging of the liver  CIWA protocol implemented due to concerns for alcohol withdrawal  Awaiting IR paracentesis and fluid studies    3/18/2023  Vitally stable this morning  Patient awaiting transfer to [facility] V's this morning per GI recommendation for checking patency of TIPS- hold transfer due alcohol withdrawal  Hemoglobin dropped to 7.0 last night, but stable at 7.4 this morning  Patient withdrawing- alcohol withdrawals- delirious this morning, ciwa in place, patient given ativan already  Magnesium 1.3-replace, protocol in place. Potassium 3.1 this morning, replacement protocol in place  Phenergan 25 mg for nausea  Repeat EKG sinus  Alpha-fetoprotein normal      Theodore Downs MD  3/18/2023  9:00 AM         Electronically signed by Theodore Downs MD     Attending Physician Statement  I have discussed the care of Genevieve Harrison with the resident team. I have examined the patient myself and taken ros and hpi , including pertinent history and exam findings,  with the resident. I have reviewed the key elements of all parts of the encounter with the resident. I agree with the assessment, plan and orders as documented by the resident. Principal Problem:    Anemia  Active Problems:    Lightheadedness  Resolved Problems:    * No resolved hospital problems.  *    Was unable to be transferred yesterday since patient refused  Overnight went into worsening alcohol withdrawal delirium- getting ativan per CIWA  Will consult [facility]  Needs transfer to [facility] in case of rebleed, also GI recommend IR to confirm patency of TIPS  Hemoglobin 7.4 today-stable from yesterday --will discuss with GI if he still needs to be transferred to [facility]    Electronically signed by Teodoro Worrell MD

## 2023-03-18 NOTE — PROGRESS NOTES
[address]   OCCUPATIONAL THERAPY MISSED TREATMENT NOTE   INPATIENT   Date: 3/18/23  Patient Name: Maureen Delgadillo       Room:   MRN: [id number]   Account #: [de-identified]    : 1960  (63 y.o.)  Gender: male      REASON FOR MISSED TREATMENT:  Other- per RN Maria Luisa Grey pt is not appropriate for OT evaluation at this time due to agitation. OT will continue to follow.     [place], OTR/L

## 2023-03-18 NOTE — PROGRESS NOTES
Patient is continuing to try and get out of bed. After 3mg of ativan there was no change He is very agitated and not redirectable. Contacted Anne Marie Gabriel. Restraints and sitter ordered.  See orders

## 2023-03-19 LAB
ABSOLUTE EOS #: 0.1 K/UL (ref 0–0.4)
ABSOLUTE LYMPH #: 0.31 K/UL (ref 1–4.8)
ABSOLUTE MONO #: 0.73 K/UL (ref 0.1–1.3)
ANION GAP SERPL CALCULATED.3IONS-SCNC: 11 MMOL/L (ref 9–17)
BASOPHILS # BLD: 1 % (ref 0–2)
BASOPHILS ABSOLUTE: 0.05 K/UL (ref 0–0.2)
BUN SERPL-MCNC: 11 MG/DL (ref 8–23)
CALCIUM SERPL-MCNC: 8.2 MG/DL (ref 8.6–10.4)
CHLORIDE SERPL-SCNC: 104 MMOL/L (ref 98–107)
CO2 SERPL-SCNC: 24 MMOL/L (ref 20–31)
CREAT SERPL-MCNC: 0.62 MG/DL (ref 0.7–1.2)
EOSINOPHILS RELATIVE PERCENT: 2 % (ref 0–4)
GFR SERPL CREATININE-BSD FRML MDRD: >60 ML/MIN/1.73M2
GLUCOSE SERPL-MCNC: 92 MG/DL (ref 70–99)
HCT VFR BLD AUTO: 23.8 % (ref 41–53)
HCT VFR BLD AUTO: 24.4 % (ref 41–53)
HCT VFR BLD AUTO: 25 % (ref 41–53)
HGB BLD-MCNC: 7.1 G/DL (ref 13.5–17.5)
HGB BLD-MCNC: 7.2 G/DL (ref 13.5–17.5)
HGB BLD-MCNC: 7.6 G/DL (ref 13.5–17.5)
LYMPHOCYTES # BLD: 6 % (ref 24–44)
MAGNESIUM SERPL-MCNC: 1.8 MG/DL (ref 1.6–2.6)
MCH RBC QN AUTO: 22.1 PG (ref 26–34)
MCHC RBC AUTO-ENTMCNC: 30.2 G/DL (ref 31–37)
MCV RBC AUTO: 73.2 FL (ref 80–100)
MONOCYTES # BLD: 14 % (ref 1–7)
MORPHOLOGY: ABNORMAL
PDW BLD-RTO: 19.2 % (ref 11.5–14.9)
PLATELET # BLD AUTO: 101 K/UL (ref 150–450)
PMV BLD AUTO: 7.5 FL (ref 6–12)
POTASSIUM SERPL-SCNC: 3.3 MMOL/L (ref 3.7–5.3)
RBC # BLD: 3.42 M/UL (ref 4.5–5.9)
SEG NEUTROPHILS: 77 % (ref 36–66)
SEGMENTED NEUTROPHILS ABSOLUTE COUNT: 4.01 K/UL (ref 1.3–9.1)
SODIUM SERPL-SCNC: 139 MMOL/L (ref 135–144)
WBC # BLD AUTO: 5.2 K/UL (ref 3.5–11)

## 2023-03-19 PROCEDURE — 99232 SBSQ HOSP IP/OBS MODERATE 35: CPT | Performed by: INTERNAL MEDICINE

## 2023-03-19 PROCEDURE — 6360000002 HC RX W HCPCS: Performed by: NURSE PRACTITIONER

## 2023-03-19 PROCEDURE — 6360000002 HC RX W HCPCS: Performed by: INTERNAL MEDICINE

## 2023-03-19 PROCEDURE — 36415 COLL VENOUS BLD VENIPUNCTURE: CPT

## 2023-03-19 PROCEDURE — C9113 INJ PANTOPRAZOLE SODIUM, VIA: HCPCS | Performed by: NURSE PRACTITIONER

## 2023-03-19 PROCEDURE — 2580000003 HC RX 258: Performed by: NURSE PRACTITIONER

## 2023-03-19 PROCEDURE — APPSS30 APP SPLIT SHARED TIME 16-30 MINUTES: Performed by: NURSE PRACTITIONER

## 2023-03-19 PROCEDURE — 2580000003 HC RX 258: Performed by: INTERNAL MEDICINE

## 2023-03-19 PROCEDURE — 6370000000 HC RX 637 (ALT 250 FOR IP): Performed by: INTERNAL MEDICINE

## 2023-03-19 PROCEDURE — 6370000000 HC RX 637 (ALT 250 FOR IP)

## 2023-03-19 PROCEDURE — 97162 PT EVAL MOD COMPLEX 30 MIN: CPT

## 2023-03-19 PROCEDURE — 83735 ASSAY OF MAGNESIUM: CPT

## 2023-03-19 PROCEDURE — 85025 COMPLETE CBC W/AUTO DIFF WBC: CPT

## 2023-03-19 PROCEDURE — 2500000003 HC RX 250 WO HCPCS

## 2023-03-19 PROCEDURE — A4216 STERILE WATER/SALINE, 10 ML: HCPCS | Performed by: NURSE PRACTITIONER

## 2023-03-19 PROCEDURE — 97530 THERAPEUTIC ACTIVITIES: CPT

## 2023-03-19 PROCEDURE — 2580000003 HC RX 258

## 2023-03-19 PROCEDURE — 6360000002 HC RX W HCPCS

## 2023-03-19 PROCEDURE — 85014 HEMATOCRIT: CPT

## 2023-03-19 PROCEDURE — 2060000000 HC ICU INTERMEDIATE R&B

## 2023-03-19 PROCEDURE — 80048 BASIC METABOLIC PNL TOTAL CA: CPT

## 2023-03-19 PROCEDURE — 85018 HEMOGLOBIN: CPT

## 2023-03-19 RX ORDER — CHLORDIAZEPOXIDE HYDROCHLORIDE 10 MG/1
10 CAPSULE, GELATIN COATED ORAL 3 TIMES DAILY
Status: DISCONTINUED | OUTPATIENT
Start: 2023-03-19 | End: 2023-03-19

## 2023-03-19 RX ORDER — POTASSIUM CHLORIDE 7.45 MG/ML
10 INJECTION INTRAVENOUS
Status: DISPENSED | OUTPATIENT
Start: 2023-03-19 | End: 2023-03-19

## 2023-03-19 RX ORDER — MAGNESIUM SULFATE HEPTAHYDRATE 40 MG/ML
2000 INJECTION, SOLUTION INTRAVENOUS ONCE
Status: COMPLETED | OUTPATIENT
Start: 2023-03-19 | End: 2023-03-19

## 2023-03-19 RX ORDER — CHLORDIAZEPOXIDE HYDROCHLORIDE 25 MG/1
25 CAPSULE, GELATIN COATED ORAL 3 TIMES DAILY
Status: COMPLETED | OUTPATIENT
Start: 2023-03-19 | End: 2023-03-20

## 2023-03-19 RX ORDER — LORAZEPAM 2 MG/ML
3 INJECTION INTRAMUSCULAR ONCE
Status: COMPLETED | OUTPATIENT
Start: 2023-03-19 | End: 2023-03-20

## 2023-03-19 RX ORDER — LORAZEPAM 2 MG/ML
1 INJECTION INTRAMUSCULAR EVERY 4 HOURS PRN
Status: DISCONTINUED | OUTPATIENT
Start: 2023-03-19 | End: 2023-03-25 | Stop reason: HOSPADM

## 2023-03-19 RX ADMIN — CHLORDIAZEPOXIDE HYDROCHLORIDE 25 MG: 25 CAPSULE ORAL at 16:59

## 2023-03-19 RX ADMIN — SODIUM CHLORIDE, PRESERVATIVE FREE 40 MG: 5 INJECTION INTRAVENOUS at 15:22

## 2023-03-19 RX ADMIN — LORAZEPAM 1 MG: 2 INJECTION INTRAMUSCULAR; INTRAVENOUS at 20:58

## 2023-03-19 RX ADMIN — PANTOPRAZOLE SODIUM 8 MG/HR: 40 INJECTION, POWDER, FOR SOLUTION INTRAVENOUS at 05:03

## 2023-03-19 RX ADMIN — LACTULOSE 20 G: 20 SOLUTION ORAL at 10:04

## 2023-03-19 RX ADMIN — CHLORDIAZEPOXIDE HYDROCHLORIDE 25 MG: 25 CAPSULE ORAL at 20:15

## 2023-03-19 RX ADMIN — METOPROLOL TARTRATE 25 MG: 25 TABLET, FILM COATED ORAL at 10:04

## 2023-03-19 RX ADMIN — CEFTRIAXONE SODIUM 1000 MG: 1 INJECTION, POWDER, FOR SOLUTION INTRAMUSCULAR; INTRAVENOUS at 17:00

## 2023-03-19 RX ADMIN — LORAZEPAM 4 MG: 2 INJECTION INTRAMUSCULAR; INTRAVENOUS at 16:00

## 2023-03-19 RX ADMIN — FERROUS SULFATE TAB 325 MG (65 MG ELEMENTAL FE) 325 MG: 325 (65 FE) TAB at 10:04

## 2023-03-19 RX ADMIN — POTASSIUM CHLORIDE 10 MEQ: 7.46 INJECTION, SOLUTION INTRAVENOUS at 13:05

## 2023-03-19 RX ADMIN — MAGNESIUM SULFATE HEPTAHYDRATE 2000 MG: 40 INJECTION, SOLUTION INTRAVENOUS at 10:55

## 2023-03-19 RX ADMIN — OCTREOTIDE ACETATE 25 MCG/HR: 500 INJECTION, SOLUTION INTRAVENOUS; SUBCUTANEOUS at 19:05

## 2023-03-19 RX ADMIN — THIAMINE HCL TAB 100 MG 100 MG: 100 TAB at 11:07

## 2023-03-19 RX ADMIN — OCTREOTIDE ACETATE 50 MCG/HR: 500 INJECTION, SOLUTION INTRAVENOUS; SUBCUTANEOUS at 00:39

## 2023-03-19 RX ADMIN — ATORVASTATIN CALCIUM 20 MG: 20 TABLET, FILM COATED ORAL at 20:15

## 2023-03-19 RX ADMIN — THIAMINE HYDROCHLORIDE 500 MG: 100 INJECTION, SOLUTION INTRAMUSCULAR; INTRAVENOUS at 18:15

## 2023-03-19 RX ADMIN — FUROSEMIDE 40 MG: 40 TABLET ORAL at 16:59

## 2023-03-19 RX ADMIN — FUROSEMIDE 40 MG: 40 TABLET ORAL at 10:04

## 2023-03-19 RX ADMIN — SPIRONOLACTONE 50 MG: 25 TABLET ORAL at 11:06

## 2023-03-19 RX ADMIN — ACETAMINOPHEN 650 MG: 325 TABLET ORAL at 16:59

## 2023-03-19 RX ADMIN — POTASSIUM CHLORIDE 10 MEQ: 7.46 INJECTION, SOLUTION INTRAVENOUS at 15:19

## 2023-03-19 RX ADMIN — LACTULOSE 20 G: 20 SOLUTION ORAL at 15:22

## 2023-03-19 RX ADMIN — LORAZEPAM 2 MG: 2 INJECTION INTRAMUSCULAR; INTRAVENOUS at 12:12

## 2023-03-19 RX ADMIN — LACTULOSE 20 G: 20 SOLUTION ORAL at 20:16

## 2023-03-19 ASSESSMENT — PAIN SCALES - GENERAL: PAINLEVEL_OUTOF10: 5

## 2023-03-19 NOTE — PROGRESS NOTES
03/19/23 1332   Encounter Summary   Encounter Overview/Reason  Volunteer Encounter   Service Provided For: Patient   Referral/Consult From: Rounding   Last Encounter  03/19/23  (V)   Complexity of Encounter Low   Spiritual/Emotional needs   Type Spiritual Support   Assessment/Intervention/Outcome   Intervention Prayer (assurance of)/[place]  (V)

## 2023-03-19 NOTE — PROGRESS NOTES
Fair;-  Standing - Static: Poor;-  Standing - Dynamic: Poor;-  Exercise Treatment: performed bed mobility and transfers to standing, see above          AM-PAC Score  AM-PAC Inpatient Mobility Raw Score : 7 (03/19/23 1011)  AM-PAC Inpatient T-Scale Score : 26.42 (03/19/23 1011)  Mobility Inpatient CMS 0-100% Score: 92.36 (03/19/23 1011)  Mobility Inpatient CMS G-Code Modifier : CM (03/19/23 1011)          Goals  Short Term Goals  Time Frame for Short Term Goals: 7 visits  Short Term Goal 1: pt to perform rolling in bed indep  Short Term Goal 2: pt to perform supine <> sit indep  Short Term Goal 3: pt to transfer bed <> chair mod indep  Short Term Goal 4: pt to ambulate 50ft mod indep  Short Term Goal 5: pt to ascend and descend 1 stair c mod indep  Patient Goals   Patient Goals : to go back home       Education  Patient Education  Education Given To: Patient  Education Provided: Role of Therapy;Plan of Care;Home Exercise Program;Precautions; Fall Prevention Strategies  Education Method: Verbal  Barriers to Learning: Cognition  Education Outcome: Verbalized understanding;Continued education needed      Therapy Time   Individual Concurrent Group Co-treatment   Time In 0844         Time Out 0913         Minutes 29         Timed Code Treatment Minutes: 9 Minutes       Jeana Dorsey PT

## 2023-03-19 NOTE — PROGRESS NOTES
presentation shows small bilateral pleural effusions and patchy bibasilar perihilar opacities, same as previous x-rays. Patient was made n.p.o., GI were consulted and patient will be treated for an acute GI bleed. Review of Systems:       ROS- unable to perform due to mental state this morning      Medications:      Allergies:  No Known Allergies    Current Meds:   Scheduled Meds:    potassium chloride  10 mEq IntraVENous Q1H    magnesium sulfate  2,000 mg IntraVENous Once    sodium chloride flush  5-40 mL IntraVENous 2 times per day    sodium chloride flush  5-40 mL IntraVENous 2 times per day    thiamine  100 mg Oral Daily    metoprolol tartrate  25 mg Oral BID    scopolamine  1 patch TransDERmal Q72H    sodium chloride flush  5-40 mL IntraVENous 2 times per day    atorvastatin  20 mg Oral Nightly    ferrous sulfate  325 mg Oral Daily with breakfast    furosemide  40 mg Oral BID    spironolactone  50 mg Oral Daily    lactulose  20 g Oral TID    cefTRIAXone (ROCEPHIN) IV  1,000 mg IntraVENous Q24H     Continuous Infusions:    sodium chloride      sodium chloride      pantoprazole 8 mg/hr (03/19/23 0503)    sodium chloride 50 mL (03/16/23 1313)    octreotide (SandoSTATIN) infusion 50 mcg/hr (03/19/23 0039)     PRN Meds: sodium chloride flush, sodium chloride, LORazepam **OR** LORazepam **OR** LORazepam **OR** LORazepam **OR** LORazepam **OR** LORazepam **OR** LORazepam **OR** LORazepam, promethazine, sodium chloride flush, sodium chloride, melatonin, sodium chloride flush, sodium chloride, polyethylene glycol, acetaminophen **OR** acetaminophen, potassium chloride, magnesium sulfate, morphine, metoprolol    Data:     Past Medical History:   has a past medical history of Abnormal EKG, Acute deep vein thrombosis (DVT) of brachial vein of right upper extremity ([facility] [address] Adenocarcinoma in a polyp ([facility] [address] Alcoholic cirrhosis of liver with ascites ([facility] [address] Anemia, Anxiety, Arthritis, Back pain, chronic, [place] esophagus, No rigidity or tenderness. Skin:     Coloration: Skin is not jaundiced. Neurological:      General: No focal deficit present. Mental Status: He is alert and oriented to person, place, and time.    Psychiatric:         Mood and Affect: Mood normal.         Assessment:        Primary Problem  Anemia    Active Hospital Problems    Diagnosis Date Noted    Lightheadedness [R42] 03/17/2023     Priority: Medium    Anemia [D64.9] 04/13/2022       Plan:        Acute GI bleed with a history of cirrhosis and varices  - Patient has a history of cirrhosis  - Patient has several episodes of GI bleeds in the past, and mentions he had had variceal banding  - On presentation hemoglobin 7.7  - Patient complained of black stools and nausea the night before admission  - No vomiting  - History of TIPS procedure and ascites  - FOBT positive on presentation  - GI on board  - Patient patient made n.p.o.  - Patient on IV Protonix  - H&H every 8 hours  - IV Protonix  - No concern for paracentesis at this  New onset A-fib with RVR  - ECG shows new onset A-fib  - Patient mentions never had a history of A-fib and is not on anticoagulation  - Cardiology saw patient  - Patient reverted to sinus rhythm on repeat EKG  - Monitor heart rhythm  - Heart rate stable on admission  DVT prophylaxis-no anticoagulation for now  - Stomach protection-IV Protonix  History of alcoholism  - Watch for alcohol withdrawal  - CIWA protocol in place     3/16/2023  Repeat ECG, new onset A-fib  H&H every 8 hours  IV Protonix  Await GI consult  Rule out C. difficile  Rocephin for SBP prophylaxis  IR guided paracentesis and fluid studies    3/17/2023  Patient examined at bedside  Vitals are stable this morning  Hemoglobin stable at 7.3-iron studies show low iron normal TIBC normal ferritin  TSH normal,  Awaiting cardiology's input on new onset A-fib with RVR  GI saw patient, put on clear liquid diet  Only wants to hydration, PPI drip, and Sandostatin drip for now

## 2023-03-19 NOTE — PROGRESS NOTES
Patient seen along with GI APRN and nursing staff around noon time. Chart reviewed and EGD that was done yesterday findings noted. At the time patient is grossly confused. He is going through DTs. He is sedated. No nausea vomiting or signs of bleeding. On further discussion nursing staff indicated that he will not be able to take oral medications. However they will try. Blood pressure is 130/70, temperature 98.1, pulse is about 80/min. Abdomen is soft. Bowel sounds present. No distention or tenderness. Lungs expands poorly. Patient does not take deep breath. Extremities negative edema. Cardiovascular pulmonary exams nonspecific. Labs as noted and the hemoglobin has been stable around 7.3 BUN is about 12 creatinine 1.75. Potassium 3.1    Assessment:    Cirrhosis of the liver with the decompensation. Status post TIPS procedure. Stage II esophageal cancer, status post radiation therapy and chemotherapy. Angioectasia of distal esophagus. Recommendations:    Discussed with Dr. Erin Rincon    To manage as DTs. Patient has a guarded prognosis given his multiple comorbidities and he continued to drink alcohol. This was discussed with the patient and patient's wife in the past.    Monitor electrolytes and correct hypokalemia. We will follow the patient.

## 2023-03-19 NOTE — PLAN OF CARE
Problem: Pain  Goal: Verbalizes/displays adequate comfort level or baseline comfort level  3/19/2023 0305 by Ansley Minaya RN  Outcome: Progressing  Note: Pt able to verbalize comfort level     Problem: Skin/Tissue Integrity  Goal: Absence of new skin breakdown  Description: 1. Monitor for areas of redness and/or skin breakdown  2. Assess vascular access sites hourly  3. Every 4-6 hours minimum:  Change oxygen saturation probe site  4. Every 4-6 hours:  If on nasal continuous positive airway pressure, respiratory therapy assess nares and determine need for appliance change or resting period.   3/19/2023 0305 by Ansley Minaya RN  Outcome: Progressing  Note: Pt absent of any new skin breakdown     Problem: Safety - Adult  Goal: Free from fall injury  3/19/2023 0305 by Ansley Minaya RN  Outcome: Progressing  Note: Pt free from falls

## 2023-03-20 LAB
HCT VFR BLD AUTO: 25 % (ref 41–53)
HCT VFR BLD AUTO: 26.7 % (ref 41–53)
HGB BLD-MCNC: 7.6 G/DL (ref 13.5–17.5)
HGB BLD-MCNC: 7.7 G/DL (ref 13.5–17.5)
POTASSIUM SERPL-SCNC: 3.4 MMOL/L (ref 3.7–5.3)

## 2023-03-20 PROCEDURE — C9113 INJ PANTOPRAZOLE SODIUM, VIA: HCPCS | Performed by: NURSE PRACTITIONER

## 2023-03-20 PROCEDURE — 2580000003 HC RX 258: Performed by: INTERNAL MEDICINE

## 2023-03-20 PROCEDURE — 6370000000 HC RX 637 (ALT 250 FOR IP): Performed by: INTERNAL MEDICINE

## 2023-03-20 PROCEDURE — 36415 COLL VENOUS BLD VENIPUNCTURE: CPT

## 2023-03-20 PROCEDURE — 99232 SBSQ HOSP IP/OBS MODERATE 35: CPT | Performed by: INTERNAL MEDICINE

## 2023-03-20 PROCEDURE — 6360000002 HC RX W HCPCS: Performed by: NURSE PRACTITIONER

## 2023-03-20 PROCEDURE — 85014 HEMATOCRIT: CPT

## 2023-03-20 PROCEDURE — A4216 STERILE WATER/SALINE, 10 ML: HCPCS | Performed by: NURSE PRACTITIONER

## 2023-03-20 PROCEDURE — 2580000003 HC RX 258: Performed by: NURSE PRACTITIONER

## 2023-03-20 PROCEDURE — 6360000002 HC RX W HCPCS: Performed by: INTERNAL MEDICINE

## 2023-03-20 PROCEDURE — 2060000000 HC ICU INTERMEDIATE R&B

## 2023-03-20 PROCEDURE — 6370000000 HC RX 637 (ALT 250 FOR IP)

## 2023-03-20 PROCEDURE — 84132 ASSAY OF SERUM POTASSIUM: CPT

## 2023-03-20 PROCEDURE — 85018 HEMOGLOBIN: CPT

## 2023-03-20 PROCEDURE — 2580000003 HC RX 258

## 2023-03-20 PROCEDURE — 6360000002 HC RX W HCPCS

## 2023-03-20 PROCEDURE — 6370000000 HC RX 637 (ALT 250 FOR IP): Performed by: PSYCHIATRY & NEUROLOGY

## 2023-03-20 PROCEDURE — 99232 SBSQ HOSP IP/OBS MODERATE 35: CPT | Performed by: PSYCHIATRY & NEUROLOGY

## 2023-03-20 RX ORDER — POTASSIUM CHLORIDE 7.45 MG/ML
10 INJECTION INTRAVENOUS PRN
Status: DISCONTINUED | OUTPATIENT
Start: 2023-03-20 | End: 2023-03-25 | Stop reason: HOSPADM

## 2023-03-20 RX ORDER — POTASSIUM CHLORIDE 20 MEQ/1
40 TABLET, EXTENDED RELEASE ORAL PRN
Status: DISCONTINUED | OUTPATIENT
Start: 2023-03-20 | End: 2023-03-25 | Stop reason: HOSPADM

## 2023-03-20 RX ORDER — RISPERIDONE 1 MG/1
0.5 TABLET ORAL 2 TIMES DAILY
Status: DISCONTINUED | OUTPATIENT
Start: 2023-03-20 | End: 2023-03-20

## 2023-03-20 RX ORDER — CHLORDIAZEPOXIDE HYDROCHLORIDE 10 MG/1
10 CAPSULE, GELATIN COATED ORAL 3 TIMES DAILY
Status: DISPENSED | OUTPATIENT
Start: 2023-03-20 | End: 2023-03-21

## 2023-03-20 RX ORDER — RISPERIDONE 1 MG/1
0.5 TABLET ORAL 2 TIMES DAILY
Status: DISCONTINUED | OUTPATIENT
Start: 2023-03-20 | End: 2023-03-25 | Stop reason: HOSPADM

## 2023-03-20 RX ADMIN — CHLORDIAZEPOXIDE HYDROCHLORIDE 10 MG: 10 CAPSULE ORAL at 14:51

## 2023-03-20 RX ADMIN — LACTULOSE 20 G: 20 SOLUTION ORAL at 14:51

## 2023-03-20 RX ADMIN — RISPERIDONE 0.5 MG: 1 TABLET ORAL at 17:40

## 2023-03-20 RX ADMIN — METOPROLOL TARTRATE 25 MG: 25 TABLET, FILM COATED ORAL at 09:13

## 2023-03-20 RX ADMIN — SODIUM CHLORIDE, PRESERVATIVE FREE 40 MG: 5 INJECTION INTRAVENOUS at 23:39

## 2023-03-20 RX ADMIN — OCTREOTIDE ACETATE 25 MCG/HR: 500 INJECTION, SOLUTION INTRAVENOUS; SUBCUTANEOUS at 05:31

## 2023-03-20 RX ADMIN — FUROSEMIDE 40 MG: 40 TABLET ORAL at 17:40

## 2023-03-20 RX ADMIN — FUROSEMIDE 40 MG: 40 TABLET ORAL at 09:13

## 2023-03-20 RX ADMIN — THIAMINE HYDROCHLORIDE 500 MG: 100 INJECTION, SOLUTION INTRAMUSCULAR; INTRAVENOUS at 14:51

## 2023-03-20 RX ADMIN — SODIUM CHLORIDE, PRESERVATIVE FREE 40 MG: 5 INJECTION INTRAVENOUS at 01:52

## 2023-03-20 RX ADMIN — THIAMINE HYDROCHLORIDE 500 MG: 100 INJECTION, SOLUTION INTRAMUSCULAR; INTRAVENOUS at 09:48

## 2023-03-20 RX ADMIN — LORAZEPAM 1 MG: 2 INJECTION INTRAMUSCULAR; INTRAVENOUS at 21:27

## 2023-03-20 RX ADMIN — LORAZEPAM 3 MG: 2 INJECTION INTRAMUSCULAR; INTRAVENOUS at 03:36

## 2023-03-20 RX ADMIN — POTASSIUM BICARBONATE 40 MEQ: 782 TABLET, EFFERVESCENT ORAL at 12:02

## 2023-03-20 RX ADMIN — THIAMINE HYDROCHLORIDE 500 MG: 100 INJECTION, SOLUTION INTRAMUSCULAR; INTRAVENOUS at 19:30

## 2023-03-20 RX ADMIN — CEFTRIAXONE SODIUM 1000 MG: 1 INJECTION, POWDER, FOR SOLUTION INTRAMUSCULAR; INTRAVENOUS at 15:48

## 2023-03-20 RX ADMIN — SODIUM CHLORIDE, PRESERVATIVE FREE 40 MG: 5 INJECTION INTRAVENOUS at 12:02

## 2023-03-20 RX ADMIN — FERROUS SULFATE TAB 325 MG (65 MG ELEMENTAL FE) 325 MG: 325 (65 FE) TAB at 09:13

## 2023-03-20 RX ADMIN — SPIRONOLACTONE 50 MG: 25 TABLET ORAL at 09:13

## 2023-03-20 RX ADMIN — CHLORDIAZEPOXIDE HYDROCHLORIDE 25 MG: 25 CAPSULE ORAL at 09:14

## 2023-03-20 NOTE — PROGRESS NOTES
[address]   OCCUPATIONAL THERAPY MISSED TREATMENT NOTE   INPATIENT   Date: 3/20/23  Patient Name: Kimmy Hitchcock       Room:   MRN: [id number]   Account #: [de-identified]    : 1960  (63 y.o.)  Gender: male      REASON FOR MISSED TREATMENT:  Other- RN passing meds at this time and requests therapy to check back. OT will re-attempt PM.    0915    Re-attempt at [phone], pt very confused as evident by pt unable to follow simple command (squeezing writer's fingers) and has been punching the air per PCT/sitter. Pt is not appropriate for OT evaluation at this time. OT will recheck 3/21/23 as appropriate.     Leah Terry, OTR/L

## 2023-03-20 NOTE — PROGRESS NOTES
HGB 7.7 03/20/2023 01:49 PM    HCT 26.7 03/20/2023 01:49 PM     CMP:    Lab Results   Component Value Date/Time     03/19/2023 04:26 AM    K 3.4 03/20/2023 04:33 AM     03/19/2023 04:26 AM    CO2 24 03/19/2023 04:26 AM    BUN 11 03/19/2023 04:26 AM    CREATININE 0.62 03/19/2023 04:26 AM    GFRAA >60 09/28/2022 01:33 PM    LABGLOM >60 03/19/2023 04:26 AM    GLUCOSE 92 03/19/2023 04:26 AM    GLUCOSE 65 04/19/2012 11:30 AM    PROT 6.4 03/16/2023 09:00 AM    LABALBU 3.1 03/16/2023 09:00 AM    LABALBU 4.7 04/19/2012 11:30 AM    CALCIUM 8.2 03/19/2023 04:26 AM    BILITOT 2.1 03/16/2023 09:00 AM    ALKPHOS 165 03/16/2023 09:00 AM    AST 51 03/16/2023 09:00 AM    ALT 11 03/16/2023 09:00 AM     BMP:    Lab Results   Component Value Date/Time     03/19/2023 04:26 AM    K 3.4 03/20/2023 04:33 AM     03/19/2023 04:26 AM    CO2 24 03/19/2023 04:26 AM    BUN 11 03/19/2023 04:26 AM    LABALBU 3.1 03/16/2023 09:00 AM    LABALBU 4.7 04/19/2012 11:30 AM    CREATININE 0.62 03/19/2023 04:26 AM    CALCIUM 8.2 03/19/2023 04:26 AM    GFRAA >60 09/28/2022 01:33 PM    LABGLOM >60 03/19/2023 04:26 AM    GLUCOSE 92 03/19/2023 04:26 AM    GLUCOSE 65 04/19/2012 11:30 AM     PT/INR:    Lab Results   Component Value Date/Time    PROTIME 17.6 03/16/2023 09:28 AM    INR 1.4 03/16/2023 09:28 AM     PTT:    Lab Results   Component Value Date/Time    APTT 33.2 02/24/2023 12:34 PM   [APTT}    Physical Examination:        General appearance: alert, cooperative and no distress  Mental Status: oriented to person, place  Lungs: clear to auscultation bilaterally, normal effort  Heart: regular rate and rhythm, no murmur,  Abdomen: soft, nontender, nondistended, bowel sounds present all four quadrants, no masses, hepatomegaly or splenomegaly  Extremities: no edema, redness or tenderness in the calves  Skin: no gross lesions, rashes, or induration    Assessment:        Primary Problem  Anemia     Active Hospital Problems    Diagnosis  Thrombocytopenia ([facility] [address] 12/23/2020    Tubular adenoma of colon 2016, 2018    Vitamin D deficiency     Wears glasses         Plan:        Anemia s/p EGD with swelling and bleeding at GE junction, possible varix, was clipped x 3  No overt bleeding  Hgb stable, 7.7  Was planning on transfer to [facility] for evaluation of TIPS  Unfortunately, patient in EtOH withdrawal at present time  D/c sandostatin  Continue PPI  Monitor for bleeding  Trend H&H  Transfuse for hgb < 7  Liquid diet as tolerated  Psych was consulted     Time spent reviewing the chart, seeing the patient, and discussing with the attending MD around 30 minutes. Explained to the patient and d/W Nursing Staff  Will F/U with you  Please call or Page for any issues or change in status  Thanks    This note is created with the assistance of the speech recognition program.  While intending to generate a document that actually reflects the content of the visit, document can still have some errors including those of syntax and sound like substitutions which may escape proof reading. Actual meaning can be extrapolated by contextual diversion. Electronically signed by MASSIMO Arias NP on 3/20/2023 at 2:16 PM         GI attending physician note. Patient seen with APRN     I independently performed an evaluation on [facility]. I have reviewed the above documentation completed by the Nurse Practitioner and agree with the history, examination, and management plan. Recommendations discussed. Patient visited along with MASSIMO and the staff. Patient is confused. He is awake. Abdominal examination benign. No signs of acute bleeding. Advised to slowly wean sedation.     With full try to feed the patient with precautions to avoid aspiration

## 2023-03-20 NOTE — PROGRESS NOTES
Dr. Erin Rincon notified patients Ex-wife is concerned about patients mentation worsening and potassium 3.4. Dr. Erin Rincon to place orders.

## 2023-03-20 NOTE — PROGRESS NOTES
and no distress  Eyes: Anicteric sclera. Pupils are equally round and reactive to light. Extraocular movements are intact. Lungs:  clear to auscultation bilaterally, normal effort  Heart:  regular rate and rhythm, no murmur  Abdomen:  soft, nontender, nondistended, normal bowel sounds, no masses, hepatomegaly, splenomegaly  Extremities:  no edema, redness, tenderness in the calves  Skin:  no gross lesions, rashes, induration  Neuro: Appears confused,, oriented x1-knows he is in [place],, no new focal weakness  Assessment:        Primary Problem  Anemia    Active Hospital Problems    Diagnosis Date Noted    Lightheadedness [R42] 03/17/2023     Priority: Medium    Anemia [D64.9] 04/13/2022           Plan:        1year-old male history of alcoholic liver cirrhosis, variceal bleeds TIPS presented with hematemesis and melena, new onset A-fib. Treated with PPI drip, octreotide, CIWA protocol  GI was consulted. IR paracentesis was attempted but did not yield enough fluid-transfer to [facility] was consulted for taking TIPS patency with IR however patient became delirious due to alcohol withdrawals and transfer was canceled. EGD revealed bleeding varix extending from GE junction which was clipped and hemostasis accomplished  Cardiology consulted for new onset A-fib  Hemoglobin remained stable    Has been more confused through 3/19-IV thiamine was added for concern of Warnicke's encephalopathy.   Patient placed on scheduled Librium, needing as needed Ativan on top of it    3/20  Patient's mental status is improved compared to yesterday he is less fidgety and not agitated  Still appears confused answering only 1 orientation question  Appears to be whispering mostly but is able to say 1 or 2 words normally as well  Remains on Librium I have reduced the dose today to 10 mg 3 times daily  We will continue with IV thiamine  Psych consult awaited        Judith Huber MD  3/20/2023  11:31 AM

## 2023-03-20 NOTE — PROGRESS NOTES
RN notified Ryan Mcgee that pt is very restless and trying to get out of bed even after 25mg of Librium at 2015 and 1mg of Ativan at 2058.

## 2023-03-20 NOTE — PROGRESS NOTES
Physical Therapy          Physical Therapy Cancel Note      DATE: 3/20/2023    NAME: Keny Horowitz  MRN: [id number]   : 1960      Patient not seen this date for Physical Therapy due to:    Pt unavailable x 2 attempts due to RN needing time to get pt his morning medications. Will continue to follow pt for therapy needs and see at later time when available.       Electronically signed by Jenise Ma PTA on 3/20/2023 at 1:42 PM

## 2023-03-20 NOTE — PLAN OF CARE
Problem: ABCDS Injury Assessment  Goal: Absence of physical injury  Outcome: Progressing     Problem: Pain  Goal: Verbalizes/displays adequate comfort level or baseline comfort level  Outcome: Progressing     Problem: Skin/Tissue Integrity  Goal: Absence of new skin breakdown  Description: 1. Monitor for areas of redness and/or skin breakdown  2. Assess vascular access sites hourly  3. Every 4-6 hours minimum:  Change oxygen saturation probe site  4. Every 4-6 hours:  If on nasal continuous positive airway pressure, respiratory therapy assess nares and determine need for appliance change or resting period.   Outcome: Progressing

## 2023-03-20 NOTE — PROGRESS NOTES
Rest of care    Plan of care note    28-year-old male past medical history of cirrhosis and variceal bleeds, TIPS alcoholism procedure presented after having hematemesis and melena. He also had new onset A-fib during admission the right sinus. He was put on PPI drip, IV hydration and CIWA protocol. IR paracentesis did not yield enough fluid so GI wanted to see the patient to [facility] V's for TIPS patency procedure. However patient became delirious on the third day due to alcohol withdrawals and ciwa  protocol was initiated. GI following, liver ultrasound on 3/17/2023 demonstrates some flow and shows a patent TIPS, no longer considering transfer to [facility].   Last hemoglobin reading 7.6

## 2023-03-20 NOTE — PLAN OF CARE
Patient fell on Saturday and hit her head, was taken to [facility] ER.  Patient was found to have a UTI, was given Keflex.  No injuries from the fall.  Should she make a f/up appt. With Dr. Parnell?   Problem: Safety - Adult  Goal: Free from fall injury  3/20/2023 1606 by Jonathon Acevedo RN  Outcome: Progressing     Problem: Discharge Planning  Goal: Discharge to home or other facility with appropriate resources  3/20/2023 1606 by Jonathon Acevedo RN  Outcome: Progressing     Problem: ABCDS Injury Assessment  Goal: Absence of physical injury  3/20/2023 1606 by Jonathon Acevedo RN  Outcome: Progressing

## 2023-03-21 PROBLEM — F10.931 ALCOHOL WITHDRAWAL SYNDROME, WITH DELIRIUM (HCC): Status: ACTIVE | Noted: 2023-03-21

## 2023-03-21 PROCEDURE — 6370000000 HC RX 637 (ALT 250 FOR IP): Performed by: INTERNAL MEDICINE

## 2023-03-21 PROCEDURE — 97535 SELF CARE MNGMENT TRAINING: CPT

## 2023-03-21 PROCEDURE — 97530 THERAPEUTIC ACTIVITIES: CPT

## 2023-03-21 PROCEDURE — C9113 INJ PANTOPRAZOLE SODIUM, VIA: HCPCS | Performed by: NURSE PRACTITIONER

## 2023-03-21 PROCEDURE — 99232 SBSQ HOSP IP/OBS MODERATE 35: CPT | Performed by: PSYCHIATRY & NEUROLOGY

## 2023-03-21 PROCEDURE — 6360000002 HC RX W HCPCS

## 2023-03-21 PROCEDURE — A4216 STERILE WATER/SALINE, 10 ML: HCPCS | Performed by: NURSE PRACTITIONER

## 2023-03-21 PROCEDURE — 6370000000 HC RX 637 (ALT 250 FOR IP): Performed by: PSYCHIATRY & NEUROLOGY

## 2023-03-21 PROCEDURE — 2580000003 HC RX 258: Performed by: NURSE PRACTITIONER

## 2023-03-21 PROCEDURE — 2060000000 HC ICU INTERMEDIATE R&B

## 2023-03-21 PROCEDURE — 99232 SBSQ HOSP IP/OBS MODERATE 35: CPT | Performed by: INTERNAL MEDICINE

## 2023-03-21 PROCEDURE — 6360000002 HC RX W HCPCS: Performed by: NURSE PRACTITIONER

## 2023-03-21 PROCEDURE — 2580000003 HC RX 258

## 2023-03-21 PROCEDURE — 97167 OT EVAL HIGH COMPLEX 60 MIN: CPT

## 2023-03-21 RX ORDER — CHLORDIAZEPOXIDE HYDROCHLORIDE 10 MG/1
10 CAPSULE, GELATIN COATED ORAL 3 TIMES DAILY
Status: DISPENSED | OUTPATIENT
Start: 2023-03-21 | End: 2023-03-22

## 2023-03-21 RX ORDER — CIPROFLOXACIN 500 MG/1
500 TABLET, FILM COATED ORAL EVERY 12 HOURS SCHEDULED
Status: DISPENSED | OUTPATIENT
Start: 2023-03-21 | End: 2023-03-23

## 2023-03-21 RX ADMIN — FUROSEMIDE 40 MG: 40 TABLET ORAL at 17:41

## 2023-03-21 RX ADMIN — METOPROLOL TARTRATE 25 MG: 25 TABLET, FILM COATED ORAL at 19:18

## 2023-03-21 RX ADMIN — CHLORDIAZEPOXIDE HYDROCHLORIDE 10 MG: 10 CAPSULE ORAL at 17:41

## 2023-03-21 RX ADMIN — LACTULOSE 20 G: 20 SOLUTION ORAL at 13:54

## 2023-03-21 RX ADMIN — ATORVASTATIN CALCIUM 20 MG: 20 TABLET, FILM COATED ORAL at 19:18

## 2023-03-21 RX ADMIN — THIAMINE HYDROCHLORIDE 500 MG: 100 INJECTION, SOLUTION INTRAMUSCULAR; INTRAVENOUS at 19:18

## 2023-03-21 RX ADMIN — RISPERIDONE 0.5 MG: 1 TABLET ORAL at 19:18

## 2023-03-21 RX ADMIN — SODIUM CHLORIDE, PRESERVATIVE FREE 40 MG: 5 INJECTION INTRAVENOUS at 23:29

## 2023-03-21 RX ADMIN — LACTULOSE 20 G: 20 SOLUTION ORAL at 10:44

## 2023-03-21 RX ADMIN — THIAMINE HYDROCHLORIDE 500 MG: 100 INJECTION, SOLUTION INTRAMUSCULAR; INTRAVENOUS at 10:50

## 2023-03-21 RX ADMIN — THIAMINE HYDROCHLORIDE 500 MG: 100 INJECTION, SOLUTION INTRAMUSCULAR; INTRAVENOUS at 17:43

## 2023-03-21 RX ADMIN — SODIUM CHLORIDE, PRESERVATIVE FREE 10 ML: 5 INJECTION INTRAVENOUS at 19:19

## 2023-03-21 RX ADMIN — SODIUM CHLORIDE, PRESERVATIVE FREE 10 ML: 5 INJECTION INTRAVENOUS at 10:44

## 2023-03-21 RX ADMIN — CIPROFLOXACIN 500 MG: 500 TABLET, FILM COATED ORAL at 19:18

## 2023-03-21 RX ADMIN — SODIUM CHLORIDE, PRESERVATIVE FREE 40 MG: 5 INJECTION INTRAVENOUS at 12:27

## 2023-03-21 NOTE — PROGRESS NOTES
drink a bit.      BP (!) 107/59   Pulse 69   Temp 98.5 °F (36.9 °C) (Axillary)   Resp 18   Ht 5' 10\" (1.778 m)   Wt 185 lb 8 oz (84.1 kg)   SpO2 96%   BMI 26.62 kg/m²   Appetite:   [] Normal/Unchanged  [] Increased  [x] Decreased      Sleep:       [x] Normal/Unchanged  [] Fair       [] Poor              Energy:    [x] Normal/Unchanged  [] Increased  [] Decreased        Aggression:  [] yes  [x] no    Patient is [] able  [x] unable to CONTRACT FOR SAFETY ON THE UNIT    PAST MEDICAL/PSYCHIATRIC HISTORY:   Past Medical History:   Diagnosis Date    Abnormal EKG     Acute deep vein thrombosis (DVT) of brachial vein of right upper extremity ([address] 01/07/2023    Also- Superficial venous thrombosis of the cephalic vein in the forearm    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 Luci Padilla, orthopedic, every 3-4 months, gets steroid injection    Lezama esophagus     Bleeding gastric varices 12/29/2022    BPH (benign prostatic hypertrophy)     Cholelithiasis     Cirrhosis ([address]     COVID-19 12/2020    pt reports he had a positive test while at [facility] in 2020, was asymptomatic    COVID-19 vaccine series completed 5/20/2021, 6/22/2021    Moderna 5/20/2021, 6/22/2021    DDD (degenerative disc disease), lumbar     Depression     Esophageal cancer ([address]     INVASIVE ADENOCARCINOMA ARISING IN TUBULAR ADENOMA WITH HIGH GRADE DYSPLASIA, ASSOCIATED WITH FOCAL INTESTINAL METAPLASIA     Esophageal varices ([address]     Fatty liver     GERD (gastroesophageal reflux disease)     GI bleed     Heart murmur     Hep C w/o coma, chronic ([address]     Hiatal hernia     History of alcohol abuse     6-12 beers a day; quit drinking 2019    History of blood transfusion     History of colon polyps 2016    History of tobacco abuse     [place] (hard of hearing)     Hyperlipidemia     Hypertension     Hyponatremia 07/20/2016    Hypotension flush 0.9 % injection 5-40 mL, 5-40 mL, IntraVENous, PRN, MASSIMO Gastelum CNP    0.9 % sodium chloride infusion, , IntraVENous, PRN, MASSIMO Gastelum CNP    promethazine (PHENERGAN) injection 25 mg, 25 mg, IntraMUSCular, Q6H PRN, Lucian Harley MD    metoprolol tartrate (LOPRESSOR) tablet 25 mg, 25 mg, Oral, BID, Lucian Harley MD, 25 mg at 03/21/23 1044    scopolamine (TRANSDERM-SCOP) transdermal patch 1 patch, 1 patch, TransDERmal, Q72H, Renetta Alvarez MD, 1 patch at 03/20/23 1835    melatonin tablet 3 mg, 3 mg, Oral, Nightly PRN, MASSIMO Perez CNP, 3 mg at 03/18/23 0024    polyethylene glycol (GLYCOLAX) packet 17 g, 17 g, Oral, Daily PRN, Lucian Harley MD    acetaminophen (TYLENOL) tablet 650 mg, 650 mg, Oral, Q6H PRN, 650 mg at 03/19/23 1659 **OR** acetaminophen (TYLENOL) suppository 650 mg, 650 mg, Rectal, Q6H PRN, Lucian Harley MD    magnesium sulfate 2000 mg in water 50 mL IVPB, 2,000 mg, IntraVENous, PRN, Lucian Harley MD, Stopped at 03/18/23 1300    atorvastatin (LIPITOR) tablet 20 mg, 20 mg, Oral, Nightly, Lucian Harley MD, 20 mg at 03/19/23 2015    ferrous sulfate (IRON 325) tablet 325 mg, 325 mg, Oral, Daily with breakfast, Lucian Harley MD, 325 mg at 03/21/23 1044    furosemide (LASIX) tablet 40 mg, 40 mg, Oral, BID, Lucian Harley MD, 40 mg at 03/21/23 1044    spironolactone (ALDACTONE) tablet 50 mg, 50 mg, Oral, Daily, Lucian Harley MD, 50 mg at 03/21/23 1043    lactulose (CHRONULAC) 10 GM/15ML solution 20 g, 20 g, Oral, TID, Lucian Harley MD, 20 g at 03/21/23 1044    morphine (PF) injection 1 mg, 1 mg, IntraVENous, Q4H PRN, Lucian Harley MD, 1 mg at 03/17/23 0041    metoprolol (LOPRESSOR) injection 5 mg, 5 mg, IntraVENous, Q6H PRN, Lucian Harley MD      Examination:  BP (!) 107/59   Pulse 69   Temp 98.5 °F (36.9 °C) (Axillary)   Resp 18   Ht 5' 10\" (1.778 m)   Wt 185 lb 8 oz (84.1 kg)   SpO2 96%   BMI 26.62 kg/m²   Gait - working with PT/OT  Medication side

## 2023-03-21 NOTE — PROGRESS NOTES
20 mg Oral Nightly    ferrous sulfate  325 mg Oral Daily with breakfast    furosemide  40 mg Oral BID    spironolactone  50 mg Oral Daily    lactulose  20 g Oral TID    cefTRIAXone (ROCEPHIN) IV  1,000 mg IntraVENous Q24H     Continuous Infusions:    sodium chloride       PRN Meds: potassium chloride **OR** potassium alternative oral replacement **OR** potassium chloride, LORazepam, sodium chloride flush, sodium chloride, promethazine, melatonin, polyethylene glycol, acetaminophen **OR** acetaminophen, magnesium sulfate, morphine, metoprolol    Data:     Past Medical History:   has a past medical history of Abnormal EKG, Acute deep vein thrombosis (DVT) of brachial vein of right upper extremity (HCC), Adenocarcinoma in a polyp ([address] Alcoholic cirrhosis of liver with ascites ([address] Anemia, Anxiety, Arthritis, Back pain, chronic, Lezama esophagus, Bleeding gastric varices, BPH (benign prostatic hypertrophy), Cholelithiasis, Cirrhosis ([address] COVID-19, COVID-19 vaccine series completed, DDD (degenerative disc disease), lumbar, Depression, Esophageal cancer ([address] Esophageal varices ([address] Fatty liver, GERD (gastroesophageal reflux disease), GI bleed, Heart murmur, Hep C w/o coma, chronic ([address] Hiatal hernia, History of alcohol abuse, History of blood transfusion, History of colon polyps, History of tobacco abuse, [place] (hard of hearing), Hyperlipidemia, Hypertension, Hyponatremia, Hypotension, Mastoid disorder, bilateral, Pericardial effusion, PONV (postoperative nausea and vomiting), Poor venous access, Port-A-Cath in place, Portal hypertension ([address] Sciatica, Secondary esophageal varices ([address] Shortness of breath, Spinal stenosis, Stomach ulcer, Thrombocytopenia ([address] Tubular adenoma of colon, Vitamin D deficiency, and Wears glasses. Social History:   reports that he quit smoking about 6 years ago. His smoking use included cigarettes. He has a 45.00 pack-year smoking history.  He has never used smokeless redness, tenderness in the calves  Skin:  no gross lesions, rashes, induration  Neuro: Appears confused,, oriented x1-knows he is in [place],, no new focal weakness  Assessment:        Primary Problem  Anemia    Active Hospital Problems    Diagnosis Date Noted    Lightheadedness [R42] 03/17/2023     Priority: Medium    Anemia [D64.9] 04/13/2022           Plan:        1year-old male history of alcoholic liver cirrhosis, variceal bleeds TIPS presented with hematemesis and melena, new onset A-fib. Treated with PPI drip, 4 days of IV octreotide, CIWA protocol switched to Librium  GI was consulted. IR paracentesis was attempted but did not yield enough fluid-transfer to [facility] was consulted for taking TIPS patency with IR however patient became delirious due to alcohol withdrawals and transfer was canceled. EGD revealed bleeding varix extending from GE junction which was clipped and hemostasis accomplished  Cardiology consulted for new onset A-fib  Hemoglobin remained stable    Has been more confused through 3/19-IV thiamine was added for concern of Warnicke's encephalopathy.   Patient placed on scheduled Librium, needing as needed Ativan on top of it    3/20  Patient's mental status is improved compared to yesterday he is less fidgety and not agitated  Still appears confused answering only 1 orientation question  Appears to be whispering mostly but is able to say 1 or 2 words normally as well  Remains on Librium I have reduced the dose today to 10 mg 3 times daily  We will continue with IV thiamine  Psych consult awaited    3/21  Appreciate psych recommendations  Day 3 of IV thiamine today  Completed 5 days of Rocephin for SBP prophylaxis-we will discontinue; switch to po cipro for additioal 2 days  Continue with Librium 10 Mg 3 times daily  Dispo=- TBD    Carmel Cornejo MD  3/21/2023  9:28 AM

## 2023-03-21 NOTE — PROGRESS NOTES
without rails  Entrance Stairs - Number of Steps: 1  Bathroom Shower/Tub: Tub/Shower unit  Bathroom Toilet: Standard  Bathroom Equipment: None  Bathroom Accessibility: Accessible  Home Equipment: negar Steven  Has the patient had two or more falls in the past year or any fall with injury in the past year?: No  Receives Help From: Family  ADL Assistance: Independent  Homemaking Assistance: Independent  Homemaking Responsibilities: Yes  Meal Prep Responsibility: Primary  Laundry Responsibility: Primary  Cleaning Responsibility: Primary  Bill Paying/Finance Responsibility: Primary  Shopping Responsibility: Primary  Dependent Care Responsibility: Primary  Health Care Management: Primary  Ambulation Assistance: Independent  Transfer Assistance: Independent  Active : Yes  Mode of Transportation: Truck  Occupation: Retired  Additional Comments: info taken from chart and verified by pt, though pt is questionable historian    Objective  Orientation  Overall Orientation Status: Impaired  Orientation Level: Disoriented X4  Cognition  Overall Cognitive Status: Exceptions  Arousal/Alertness: Delayed responses to stimuli  Following Commands: Inconsistently follows commands  Attention Span: Difficulty attending to directions  Safety Judgement: Decreased awareness of need for assistance, Decreased awareness of need for safety  Problem Solving: Decreased awareness of errors  Insights: Not aware of deficits  Initiation: Requires cues for all  Sequencing: Requires cues for all  Cognition Comment: requires tactile and verbal cues for hand placement sitting EOB and with standing as he takes hands off walker or bed rail whcih causes instability   Sensation  Overall Sensation Status: WFL    ADL  Feeding: NPO  Grooming: Dependent/Total  UE Bathing: Dependent/Total  LE Bathing: Dependent/Total  LE Bathing Skilled Clinical Factors: Dependent x2 to roll L and R, Total assist for aristides/buttocks hygiene  UE Dressing: resistive and was returned to bed    Balance  Balance  Sitting Balance: Unable to assess(comment)  Standing Balance: Unable to assess(comment)  Standing Balance  Comment: JASON - safety concerns    Transfers  Transfers  Transfer Comments: JASON - safety concerns    Functional Mobility  Functional Mobility Comments: JASON - safety concerns    Assessment  Assessment  Performance deficits / Impairments: Decreased functional mobility , Decreased ROM, Decreased ADL status, Decreased strength, Decreased safe awareness, Decreased endurance, Decreased cognition, Decreased balance, Decreased high-level IADLs, Decreased fine motor control, Decreased coordination, Decreased posture  Treatment Diagnosis: Impaired self-care status  Prognosis: Fair  Decision Making: High Complexity  Discharge Recommendations: Patient would benefit from continued therapy after discharge    Activity Tolerance  Activity Tolerance: Treatment limited secondary to decreased cognition    Safety Devices  Type of Devices:  All wendy prominences offloaded, All fall risk precautions in place, Bed alarm in place, Call light within reach, Patient at risk for falls, Left in bed, Sitter present    Patient Education  Patient Education  Education Given To: Patient  Education Provided: Role of Therapy, Plan of Care  Education Method: Verbal  Barriers to Learning: Cognition  Education Outcome: Continued education needed    Functional Outcome Measures  AM-PAC Daily Activity - Inpatient   How much help is needed for putting on and taking off regular lower body clothing?: Total  How much help is needed for bathing (which includes washing, rinsing, drying)?: Total  How much help is needed for toileting (which includes using toilet, bedpan, or urinal)?: Total  How much help is needed for putting on and taking off regular upper body clothing?: Total  How much help is needed for taking care of personal grooming?: Total  How much help for eating meals?: Total (NPO)  AM-PAC

## 2023-03-21 NOTE — PLAN OF CARE
Problem: Safety - Adult  Goal: Free from fall injury  3/21/2023 0359 by Alpa Colin RN  Outcome: Progressing     Problem: Skin/Tissue Integrity  Goal: Absence of new skin breakdown  Description: 1. Monitor for areas of redness and/or skin breakdown  2. Assess vascular access sites hourly  3. Every 4-6 hours minimum:  Change oxygen saturation probe site  4. Every 4-6 hours:  If on nasal continuous positive airway pressure, respiratory therapy assess nares and determine need for appliance change or resting period.   3/21/2023 0359 by Alpa Colin RN  Outcome: Progressing     Problem: ABCDS Injury Assessment  Goal: Absence of physical injury  3/21/2023 0359 by Alpa Colin RN  Outcome: Progressing

## 2023-03-21 NOTE — PROGRESS NOTES
GFRAA >60 09/28/2022 01:33 PM    LABGLOM >60 03/19/2023 04:26 AM    GLUCOSE 92 03/19/2023 04:26 AM    GLUCOSE 65 04/19/2012 11:30 AM    PROT 6.4 03/16/2023 09:00 AM    LABALBU 3.1 03/16/2023 09:00 AM    LABALBU 4.7 04/19/2012 11:30 AM    CALCIUM 8.2 03/19/2023 04:26 AM    BILITOT 2.1 03/16/2023 09:00 AM    ALKPHOS 165 03/16/2023 09:00 AM    AST 51 03/16/2023 09:00 AM    ALT 11 03/16/2023 09:00 AM     BMP:    Lab Results   Component Value Date/Time     03/19/2023 04:26 AM    K 3.4 03/20/2023 04:33 AM     03/19/2023 04:26 AM    CO2 24 03/19/2023 04:26 AM    BUN 11 03/19/2023 04:26 AM    LABALBU 3.1 03/16/2023 09:00 AM    LABALBU 4.7 04/19/2012 11:30 AM    CREATININE 0.62 03/19/2023 04:26 AM    CALCIUM 8.2 03/19/2023 04:26 AM    GFRAA >60 09/28/2022 01:33 PM    LABGLOM >60 03/19/2023 04:26 AM    GLUCOSE 92 03/19/2023 04:26 AM    GLUCOSE 65 04/19/2012 11:30 AM     PT/INR:    Lab Results   Component Value Date/Time    PROTIME 17.6 03/16/2023 09:28 AM    INR 1.4 03/16/2023 09:28 AM     PTT:    Lab Results   Component Value Date/Time    APTT 33.2 02/24/2023 12:34 PM   [APTT}    Physical Examination:        General appearance: drowsy, uncooperative  Abdomen: soft, nontender, nondistended, bowel sounds present all four quadrants, no masses, hepatomegaly or splenomegaly  Extremities: no edema, redness or tenderness in the calves  Skin: no gross lesions, rashes, or induration    Assessment:        Primary Problem  Anemia     Active Hospital Problems    Diagnosis Date Noted    Lightheadedness [R42] 03/17/2023     Priority: Medium    Anemia [D64.9] 04/13/2022     Past Medical History:   Diagnosis Date    Abnormal EKG     Acute deep vein thrombosis (DVT) of brachial vein of right upper extremity (HCC) 01/07/2023    Also- Superficial venous thrombosis of the cephalic vein in the forearm    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 Explained to the patient and d/W Nursing Staff  Will F/U with you  Please call or Page for any issues or change in status  Thanks    This note is created with the assistance of the speech recognition program.  While intending to generate a document that actually reflects the content of the visit, document can still have some errors including those of syntax and sound like substitutions which may escape proof reading. Actual meaning can be extrapolated by contextual diversion. Electronically signed by MASSIMO Rodriges NP on 3/21/2023 at 1:28 PM     GI attending physician note. Patient seen with MASSIMO     I independently performed an evaluation on Mariel Storey. I have reviewed the above documentation completed by the Nurse Practitioner and agree with the history, examination, and management plan. Recommendations discussed. Patient is more drowsy. On reviewing the medication, patient received 3 mg of Ativan. Also he is on Librium. Advised to cut down the dose of sedatives and also Librium.

## 2023-03-21 NOTE — PROGRESS NOTES
Physical Therapy  Facility/Department: [facility]  Physical Therapy Treatment Note    Name: Dwight Mohr  : 1960  MRN: [id number]  Date of Service: 3/21/2023    Discharge Recommendations:  Patient would benefit from continued therapy after discharge, Continue to assess pending progress   PT Equipment Recommendations  Equipment Needed: No      Patient Diagnosis(es): The primary encounter diagnosis was Lightheadedness. A diagnosis of Gastrointestinal hemorrhage, unspecified gastrointestinal hemorrhage type was also pertinent to this visit. Past Medical History:  has a past medical history of Abnormal EKG, Acute deep vein thrombosis (DVT) of brachial vein of right upper extremity ([address] Adenocarcinoma in a polyp ([address] Alcoholic cirrhosis of liver with ascites ([address] Anemia, Anxiety, Arthritis, Back pain, chronic, Lezama esophagus, Bleeding gastric varices, BPH (benign prostatic hypertrophy), Cholelithiasis, Cirrhosis ([address] COVID-19, COVID-19 vaccine series completed, DDD (degenerative disc disease), lumbar, Depression, Esophageal cancer ([address] Esophageal varices ([address] Fatty liver, GERD (gastroesophageal reflux disease), GI bleed, Heart murmur, Hep C w/o coma, chronic ([address] Hiatal hernia, History of alcohol abuse, History of blood transfusion, History of colon polyps, History of tobacco abuse, [place] (hard of hearing), Hyperlipidemia, Hypertension, Hyponatremia, Hypotension, Mastoid disorder, bilateral, Pericardial effusion, PONV (postoperative nausea and vomiting), Poor venous access, Port-A-Cath in place, Portal hypertension ([address] Sciatica, Secondary esophageal varices ([address] Shortness of breath, Spinal stenosis, Stomach ulcer, Thrombocytopenia ([address] Tubular adenoma of colon, Vitamin D deficiency, and Wears glasses. Past Surgical History:  has a past surgical history that includes Bunionectomy; Nasal septum surgery; other surgical history (2016); Colonoscopy;  Colonoscopy (10/05/2016); Patient  Education Provided: Role of Therapy;Plan of Care;Precautions; Fall Prevention Strategies; Equipment  Education Method: Verbal  Barriers to Learning: Cognition  Education Outcome: Continued education needed      Therapy Time   Individual Concurrent Group Co-treatment   Time In 1101         Time Out 1125         Minutes 24         Timed Code Treatment Minutes: 24 Minutes       Priyanka Hopkins PT

## 2023-03-21 NOTE — PROGRESS NOTES
03/21/23 1344   Encounter Summary   Encounter Overview/Reason   Encounter   Service Provided For: Patient   Referral/Consult From: Rounding   Last Encounter  03/21/23   Complexity of Encounter Low   Spiritual/Emotional needs   Type Spiritual Support   Rituals, Rites and Sacraments   Type Anointing  (Fr Grimm 3/21/23)

## 2023-03-21 NOTE — PLAN OF CARE
Problem: Safety - Adult  Goal: Free from fall injury  Outcome: Progressing  Flowsheets (Taken 3/21/2023 0800)  Free From Fall Injury:   Instruct family/caregiver on patient safety   Based on caregiver fall risk screen, instruct family/caregiver to ask for assistance with transferring infant if caregiver noted to have fall risk factors     Problem: Discharge Planning  Goal: Discharge to home or other facility with appropriate resources  Outcome: Progressing  Flowsheets (Taken 3/21/2023 0930)  Discharge to home or other facility with appropriate resources:   Identify barriers to discharge with patient and caregiver   Arrange for needed discharge resources and transportation as appropriate   Identify discharge learning needs (meds, wound care, etc)   Refer to discharge planning if patient needs post-hospital services based on physician order or complex needs related to functional status, cognitive ability or social support system     Problem: ABCDS Injury Assessment  Goal: Absence of physical injury  Outcome: Progressing     Problem: Pain  Goal: Verbalizes/displays adequate comfort level or baseline comfort level  Outcome: Progressing     Problem: Skin/Tissue Integrity  Goal: Absence of new skin breakdown  Description: 1. Monitor for areas of redness and/or skin breakdown  2. Assess vascular access sites hourly  3. Every 4-6 hours minimum:  Change oxygen saturation probe site  4. Every 4-6 hours:  If on nasal continuous positive airway pressure, respiratory therapy assess nares and determine need for appliance change or resting period. Outcome: Progressing     Problem: Safety - Medical Restraint  Goal: Remains free of injury from restraints (Restraint for Interference with Medical Device)  Description: INTERVENTIONS:  1. Determine that other, less restrictive measures have been tried or would not be effective before applying the restraint  2.  Evaluate the patient's condition at the time of restraint application  3. Inform patient/family regarding the reason for restraint  4.  Q2H: Monitor safety, psychosocial status, comfort, nutrition and hydration  Outcome: Progressing

## 2023-03-22 ENCOUNTER — APPOINTMENT (OUTPATIENT)
Dept: CT IMAGING | Age: 63
DRG: 432 | End: 2023-03-22
Payer: COMMERCIAL

## 2023-03-22 LAB
ABSOLUTE EOS #: 0.08 K/UL (ref 0–0.4)
ABSOLUTE LYMPH #: 0.35 K/UL (ref 1–4.8)
ABSOLUTE MONO #: 0.74 K/UL (ref 0.1–1.3)
ALBUMIN SERPL-MCNC: 2.9 G/DL (ref 3.5–5.2)
ALP SERPL-CCNC: 122 U/L (ref 40–129)
ALT SERPL-CCNC: 8 U/L (ref 5–41)
AMMONIA PLAS-SCNC: 30 UMOL/L (ref 16–60)
ANION GAP SERPL CALCULATED.3IONS-SCNC: 10 MMOL/L (ref 9–17)
AST SERPL-CCNC: 30 U/L
BASOPHILS # BLD: 0 % (ref 0–2)
BASOPHILS ABSOLUTE: 0 K/UL (ref 0–0.2)
BILIRUB SERPL-MCNC: 2.7 MG/DL (ref 0.3–1.2)
BUN SERPL-MCNC: 7 MG/DL (ref 8–23)
CALCIUM SERPL-MCNC: 8.1 MG/DL (ref 8.6–10.4)
CHLORIDE SERPL-SCNC: 97 MMOL/L (ref 98–107)
CO2 SERPL-SCNC: 28 MMOL/L (ref 20–31)
CREAT SERPL-MCNC: 0.79 MG/DL (ref 0.7–1.2)
EOSINOPHILS RELATIVE PERCENT: 2 % (ref 0–4)
GFR SERPL CREATININE-BSD FRML MDRD: >60 ML/MIN/1.73M2
GLUCOSE SERPL-MCNC: 154 MG/DL (ref 70–99)
HCT VFR BLD AUTO: 28.9 % (ref 41–53)
HGB BLD-MCNC: 8.1 G/DL (ref 13.5–17.5)
INR PPP: 1.5
LYMPHOCYTES # BLD: 9 % (ref 24–44)
MAGNESIUM SERPL-MCNC: 1.2 MG/DL (ref 1.6–2.6)
MCH RBC QN AUTO: 21.1 PG (ref 26–34)
MCHC RBC AUTO-ENTMCNC: 28.1 G/DL (ref 31–37)
MCV RBC AUTO: 75.2 FL (ref 80–100)
MONOCYTES # BLD: 19 % (ref 1–7)
MORPHOLOGY: ABNORMAL
PDW BLD-RTO: 20.7 % (ref 11.5–14.9)
PLATELET # BLD AUTO: 90 K/UL (ref 150–450)
PMV BLD AUTO: 7.8 FL (ref 6–12)
POTASSIUM SERPL-SCNC: 2.8 MMOL/L (ref 3.7–5.3)
PROT SERPL-MCNC: 6.1 G/DL (ref 6.4–8.3)
PROTHROMBIN TIME: 18.7 SEC (ref 11.8–14.6)
RBC # BLD: 3.84 M/UL (ref 4.5–5.9)
SEG NEUTROPHILS: 70 % (ref 36–66)
SEGMENTED NEUTROPHILS ABSOLUTE COUNT: 2.73 K/UL (ref 1.3–9.1)
SODIUM SERPL-SCNC: 135 MMOL/L (ref 135–144)
WBC # BLD AUTO: 3.9 K/UL (ref 3.5–11)

## 2023-03-22 PROCEDURE — 85025 COMPLETE CBC W/AUTO DIFF WBC: CPT

## 2023-03-22 PROCEDURE — A4216 STERILE WATER/SALINE, 10 ML: HCPCS | Performed by: NURSE PRACTITIONER

## 2023-03-22 PROCEDURE — 2060000000 HC ICU INTERMEDIATE R&B

## 2023-03-22 PROCEDURE — 6360000002 HC RX W HCPCS: Performed by: NURSE PRACTITIONER

## 2023-03-22 PROCEDURE — 6370000000 HC RX 637 (ALT 250 FOR IP): Performed by: PSYCHIATRY & NEUROLOGY

## 2023-03-22 PROCEDURE — 6360000002 HC RX W HCPCS: Performed by: INTERNAL MEDICINE

## 2023-03-22 PROCEDURE — 6370000000 HC RX 637 (ALT 250 FOR IP): Performed by: INTERNAL MEDICINE

## 2023-03-22 PROCEDURE — 2580000003 HC RX 258: Performed by: NURSE PRACTITIONER

## 2023-03-22 PROCEDURE — 80053 COMPREHEN METABOLIC PANEL: CPT

## 2023-03-22 PROCEDURE — 99232 SBSQ HOSP IP/OBS MODERATE 35: CPT | Performed by: INTERNAL MEDICINE

## 2023-03-22 PROCEDURE — 97530 THERAPEUTIC ACTIVITIES: CPT

## 2023-03-22 PROCEDURE — 83735 ASSAY OF MAGNESIUM: CPT

## 2023-03-22 PROCEDURE — 99232 SBSQ HOSP IP/OBS MODERATE 35: CPT | Performed by: PSYCHIATRY & NEUROLOGY

## 2023-03-22 PROCEDURE — 2500000003 HC RX 250 WO HCPCS: Performed by: INTERNAL MEDICINE

## 2023-03-22 PROCEDURE — 85610 PROTHROMBIN TIME: CPT

## 2023-03-22 PROCEDURE — 6370000000 HC RX 637 (ALT 250 FOR IP): Performed by: NURSE PRACTITIONER

## 2023-03-22 PROCEDURE — 36415 COLL VENOUS BLD VENIPUNCTURE: CPT

## 2023-03-22 PROCEDURE — 2580000003 HC RX 258: Performed by: INTERNAL MEDICINE

## 2023-03-22 PROCEDURE — 6360000002 HC RX W HCPCS

## 2023-03-22 PROCEDURE — C9113 INJ PANTOPRAZOLE SODIUM, VIA: HCPCS | Performed by: NURSE PRACTITIONER

## 2023-03-22 PROCEDURE — 70450 CT HEAD/BRAIN W/O DYE: CPT

## 2023-03-22 PROCEDURE — 2580000003 HC RX 258

## 2023-03-22 PROCEDURE — 97110 THERAPEUTIC EXERCISES: CPT

## 2023-03-22 PROCEDURE — 97535 SELF CARE MNGMENT TRAINING: CPT

## 2023-03-22 PROCEDURE — 82140 ASSAY OF AMMONIA: CPT

## 2023-03-22 RX ORDER — CHLORDIAZEPOXIDE HYDROCHLORIDE 5 MG/1
5 CAPSULE, GELATIN COATED ORAL 3 TIMES DAILY
Status: DISCONTINUED | OUTPATIENT
Start: 2023-03-22 | End: 2023-03-22

## 2023-03-22 RX ORDER — CHLORDIAZEPOXIDE HYDROCHLORIDE 5 MG/1
5 CAPSULE, GELATIN COATED ORAL 3 TIMES DAILY
Status: DISPENSED | OUTPATIENT
Start: 2023-03-22 | End: 2023-03-23

## 2023-03-22 RX ADMIN — RISPERIDONE 0.5 MG: 1 TABLET ORAL at 23:32

## 2023-03-22 RX ADMIN — THIAMINE HYDROCHLORIDE 500 MG: 100 INJECTION, SOLUTION INTRAMUSCULAR; INTRAVENOUS at 08:39

## 2023-03-22 RX ADMIN — MAGNESIUM SULFATE HEPTAHYDRATE 2000 MG: 40 INJECTION, SOLUTION INTRAVENOUS at 18:42

## 2023-03-22 RX ADMIN — SODIUM CHLORIDE, PRESERVATIVE FREE 10 ML: 5 INJECTION INTRAVENOUS at 23:35

## 2023-03-22 RX ADMIN — Medication 3 MG: at 23:32

## 2023-03-22 RX ADMIN — CHLORDIAZEPOXIDE HYDROCHLORIDE 10 MG: 10 CAPSULE ORAL at 08:31

## 2023-03-22 RX ADMIN — SPIRONOLACTONE 50 MG: 25 TABLET ORAL at 08:30

## 2023-03-22 RX ADMIN — SODIUM CHLORIDE, PRESERVATIVE FREE 10 ML: 5 INJECTION INTRAVENOUS at 08:54

## 2023-03-22 RX ADMIN — CIPROFLOXACIN 500 MG: 500 TABLET, FILM COATED ORAL at 08:53

## 2023-03-22 RX ADMIN — THIAMINE HYDROCHLORIDE 500 MG: 100 INJECTION, SOLUTION INTRAMUSCULAR; INTRAVENOUS at 16:27

## 2023-03-22 RX ADMIN — CIPROFLOXACIN 500 MG: 500 TABLET, FILM COATED ORAL at 23:36

## 2023-03-22 RX ADMIN — FUROSEMIDE 40 MG: 40 TABLET ORAL at 08:32

## 2023-03-22 RX ADMIN — PROMETHAZINE HYDROCHLORIDE 25 MG: 25 INJECTION INTRAMUSCULAR; INTRAVENOUS at 13:11

## 2023-03-22 RX ADMIN — ATORVASTATIN CALCIUM 20 MG: 20 TABLET, FILM COATED ORAL at 23:34

## 2023-03-22 RX ADMIN — METOPROLOL TARTRATE 25 MG: 25 TABLET, FILM COATED ORAL at 23:32

## 2023-03-22 RX ADMIN — LACTULOSE 20 G: 20 SOLUTION ORAL at 08:30

## 2023-03-22 RX ADMIN — METOPROLOL TARTRATE 25 MG: 25 TABLET, FILM COATED ORAL at 08:31

## 2023-03-22 RX ADMIN — LACTULOSE 20 G: 20 SOLUTION ORAL at 23:32

## 2023-03-22 RX ADMIN — SODIUM CHLORIDE, PRESERVATIVE FREE 40 MG: 5 INJECTION INTRAVENOUS at 13:00

## 2023-03-22 RX ADMIN — MAGNESIUM SULFATE HEPTAHYDRATE 2000 MG: 40 INJECTION, SOLUTION INTRAVENOUS at 15:10

## 2023-03-22 RX ADMIN — FERROUS SULFATE TAB 325 MG (65 MG ELEMENTAL FE) 325 MG: 325 (65 FE) TAB at 08:30

## 2023-03-22 RX ADMIN — RISPERIDONE 0.5 MG: 1 TABLET ORAL at 08:31

## 2023-03-22 RX ADMIN — CHLORDIAZEPOXIDE HYDROCHLORIDE 5 MG: 5 CAPSULE ORAL at 23:32

## 2023-03-22 RX ADMIN — POTASSIUM CHLORIDE: 2 INJECTION, SOLUTION, CONCENTRATE INTRAVENOUS at 12:14

## 2023-03-22 RX ADMIN — SODIUM CHLORIDE, PRESERVATIVE FREE 40 MG: 5 INJECTION INTRAVENOUS at 23:31

## 2023-03-22 ASSESSMENT — PAIN DESCRIPTION - DESCRIPTORS: DESCRIPTORS: ACHING

## 2023-03-22 ASSESSMENT — PAIN SCALES - GENERAL: PAINLEVEL_OUTOF10: 8

## 2023-03-22 ASSESSMENT — PAIN DESCRIPTION - LOCATION: LOCATION: HIP

## 2023-03-22 ASSESSMENT — PAIN DESCRIPTION - ORIENTATION: ORIENTATION: RIGHT;LEFT

## 2023-03-22 NOTE — PROGRESS NOTES
stimuli  Following Commands: Inconsistently follows commands  Attention Span: Difficulty attending to directions  Safety Judgement: Decreased awareness of need for assistance;Decreased awareness of need for safety  Problem Solving: Decreased awareness of errors  Insights: Not aware of deficits  Initiation: Requires cues for all  Sequencing: Requires cues for all  Cognition Comment: requires tactile and verbal cues for hand placement sitting EOB and with standing as he takes hands off walker or bed rail whcih causes instability     Objective   Vitals  O2 Device: None (Room air)  Bed Mobility Training  Bed Mobility Training: Yes  Overall Level of Assistance:  (Pt requires extended time to complete all tasks this date. Cues to stay on taks and for proper technique.)  Interventions: Safety awareness training; Tactile cues; Verbal cues  Rolling: Minimum assistance;Assist X1;Additional time; Adaptive equipment  Supine to Sit: Minimum assistance;Assist X2;Additional time; Adaptive equipment  Sit to Supine: Minimum assistance;Assist X1;Additional time; Adaptive equipment  Scooting: Minimum assistance; Additional time; Adaptive equipment;Assist X1 (Major posterior lean. RUST bed to [facility].)  Duration:  (8min and 15 sec. Pt reports dizziness upon sitting  up. Pt's BP is 80/61, retook and reads 84/57. Pt's BP after returning supine is 91/60. RN Harjeet Murphy aware.)  Balance  Sitting: Impaired  Sitting - Static:  (SBA-Min A)  Sitting - Dynamic:  (SBA-Min A)  Standing: With support (Deferred standing d/t low BP.)  Transfer Training  Transfer Training: No     PT Exercises  Exercise Treatment: performed bed mobility with increase time to complete all tasks. A/AROM Exercises: Seated BLE ex's with visual/verbal cues for techninque. Reps: 10 each. Static Sitting Balance Exercises: Static/dyn sitting x8min and 15sec. Dynamic Sitting Balance Exercises: Sitting dyn functional act x2. faciliated by Jamison Gay.  Pt requires SBA - Min A x1 for sitting balance d/t posterior lean. AM-[facility] Basic Mobility - Inpatient   How much help is needed turning from your back to your side while in a flat bed without using bedrails?: A Little  How much help is needed moving from lying on your back to sitting on the side of a flat bed without using bedrails?: A Little  How much help is needed moving to and from a bed to a chair?: Total  How much help is needed standing up from a chair using your arms?: Total  How much help is needed walking in hospital room?: Total  How much help is needed climbing 3-5 steps with a railing?: Total  AM-PAC Inpatient Mobility Raw Score : 10  AM-PAC Inpatient T-Scale Score : 32.29  Mobility Inpatient CMS 0-100% Score: 76.75  Mobility Inpatient CMS G-Code Modifier : CL    Goals  Short Term Goals  Time Frame for Short Term Goals: 7 visits - UPDATED GOALS 3/21/23  Short Term Goal 1: Pt to perform bed mobility minx1. Short Term Goal 2: Pt to perform transfers Adia Rosa stedy vs RW) min to mod x1. Short Term Goal 3: Pt to tolerate sitting up in recliner for 30-45 minutes. Short Term Goal 4: Pt to improve BLE strength by 1/2 MMG. Short Term Goal 5: pt to tolerate 30-45 minute PT session with active participation. Additional Goals?: Yes  Short Term Goal 6: Pt to sit edge of bed CGA/min x1 for up to 5 minutes, FAIR posture and sitting balance.   Patient Goals   Patient Goals : to go back home    Education  Patient Education  Education Given To: Patient  Education Provided: Role of Therapy;Plan of Care;Precautions  Education Method: Verbal  Barriers to Learning: Cognition  Education Outcome: Continued education needed    Therapy Time   Individual Concurrent Group Co-treatment   Time In 1120         Time Out 1145         Minutes [address] [place], [place]

## 2023-03-22 NOTE — PROGRESS NOTES
LITHIUM  No results found for: VALPROATE, CBMZ    RISK ASSESSMENT: moderate risk of agitation    Treatment Plan:  Reviewed current Medications with the patient. - Does not require admission to [facility] at this time. Will continue to assess   - On CIWA protocol with librium 5 TID   - Last dose of IV thiamine today   - Recommend neurology consult for evaluation visuospatial neglect  - Continue on risperidal 0.5 BID   - Continue 1:1 with bedside sitter   - Monitor need for ativan. Patient has not required since 3/10   - Monitor changes in mentation   - Will attempt to help patient develop insight   - Patient would benefit from addiction and drug rehabilitation center on discharge for chronic alcohol use  - Will continue to follow while inpatient    Plan to be reviewed by attending. Further recommendations to follow    PSYCHOTHERAPY/COUNSELING:  [] Therapeutic interview  [x] Supportive  [] CBT  [] Ongoing  [] Other                                             Dwight Mohr is a 60 y.o. male being evaluated face to face. --Manishpj Lynn, MS4 on 3/22/2023 at 11:14 AM    An electronic signature was used to authenticate this note. **This report has been created using voice recognition software. It may contain minor errors which are inherent in voice recognition technology. **   I independently saw and evaluated the patient. I reviewed the  documentation above. Any additional comments or changes to the   documentation are stated below otherwise agree with assessment. I have discussed the patient with the medical student and with the primary team.  He appears to have some improvement in his mentation. The patient was seen at bedside. He has received Phenergan and is confused. He continues to grasp in the air at unseen objects. His vitals are not suggestive of ongoing alcohol withdrawal.  I have noted that the patient's bilirubin has risen. Will consult neurology as discussed with primary.   CT brain scan ordered      PLAN  Medications as noted above    Follow-up daily while on inpatient unit    Electronically signed by Kaleb Morales MD on 3/22/23 at 7:44 PM EDT

## 2023-03-22 NOTE — PLAN OF CARE
Problem: Safety - Adult  Goal: Free from fall injury  3/22/2023 0022 by Cristóbal Moore RN  Outcome: Progressing     Problem: Discharge Planning  Goal: Discharge to home or other facility with appropriate resources  3/22/2023 0022 by Cristóbal Moore RN  Outcome: Progressing     Problem: ABCDS Injury Assessment  Goal: Absence of physical injury  3/22/2023 0022 by Cristóbal Moore RN  Outcome: Progressing     Problem: Skin/Tissue Integrity  Goal: Absence of new skin breakdown  Description: 1. Monitor for areas of redness and/or skin breakdown  2. Assess vascular access sites hourly  3. Every 4-6 hours minimum:  Change oxygen saturation probe site  4. Every 4-6 hours:  If on nasal continuous positive airway pressure, respiratory therapy assess nares and determine need for appliance change or resting period.   3/22/2023 0022 by Cristóbal Moore RN  Outcome: Progressing

## 2023-03-22 NOTE — PLAN OF CARE

## 2023-03-22 NOTE — PROGRESS NOTES
rashes, induration  Neuro: Appears confused,, oriented x1-knows he is in [facility],, no new focal weakness  Assessment:        Primary Problem  Anemia    Active Hospital Problems    Diagnosis Date Noted    Alcohol withdrawal syndrome, with delirium ([facility] [address] [F10.931] 03/21/2023     Priority: Medium    Lightheadedness [R42] 03/17/2023     Priority: Medium    Anemia [D64.9] 04/13/2022           Plan:        1year-old male history of alcoholic liver cirrhosis, variceal bleeds TIPS presented with hematemesis and melena, new onset A-fib. Treated with PPI drip, 4 days of IV octreotide, CIWA protocol switched to Librium  GI was consulted. IR paracentesis was attempted but did not yield enough fluid-transfer to [facility] was consulted for taking TIPS patency with IR however patient became delirious due to alcohol withdrawals and transfer was canceled. EGD revealed bleeding varix extending from GE junction which was clipped and hemostasis accomplished  Cardiology consulted for new onset A-fib  Hemoglobin remained stable    Has been more confused through 3/19-IV thiamine was added for concern of Warnicke's encephalopathy.   Patient placed on scheduled Librium, needing as needed Ativan on top of it    3/20  Patient's mental status is improved compared to yesterday he is less fidgety and not agitated  Still appears confused answering only 1 orientation question  Appears to be whispering mostly but is able to say 1 or 2 words normally as well  Remains on Librium I have reduced the dose today to 10 mg 3 times daily  We will continue with IV thiamine  Psych consult awaited    3/21  Appreciate psych recommendations  Day 3 of IV thiamine today  Completed 5 days of Rocephin for SBP prophylaxis-we will discontinue; switch to po cipro for additioal 2 days  Continue with Librium 10 Mg 3 times daily    3/22  Reduce Librium to 5mg 3 times dailyX3 doses  Last day of IV thiamine today  Patient is awake alert oriented x3 other than that he thinks he is at [address] rather than [employer]. Following commands moving all extremities speech is low tone but normal articulation.   Overall significantly improved we will taper off Librium by tomorrow  PT OT to see him later today  Anticipate ready for discharge in next 1 to 2 days    Dispo=- TBSOFIE Hernandez MD  3/22/2023  10:01 AM

## 2023-03-22 NOTE — PROGRESS NOTES
Method: Verbal  Barriers to Learning: Cognition  Education Outcome: Continued education needed      Goals  Short Term Goals  Time Frame for Short Term Goals: By discharge  Short Term Goal 1: Patient will perform bed mobility with Mod A x2 to sit EOB for 5+ minutes during self-care/functional activity of choice  Short Term Goal 2: Patient will actively participate in 15+ minutes of self-care to the best of patient's ability with Minimal verbal cues for attention and safety  Short Term Goal 3: Patient participate in grooming with [place] assist and <5 cues  Short Term Goal 4: Patient will participate in 15+ minutes of therapeutic exercise/functional activity to improve safety and independence with self-care  Short Term Goal 5: OTR to further assess transfers as able  Occupational Therapy Plan  Times Per Week: 4-6  Times Per Day: Once a day  Current Treatment Recommendations: Self-Care / ADL, Strengthening, ROM, Balance training, Functional mobility training, Endurance training, Cognitive reorientation, Pain management, Safety education & training, Patient/Caregiver education & training, Equipment evaluation, education, & procurement, Positioning, Home management training, Coordination training      Assessment  Activity Tolerance  Activity Tolerance: Treatment limited secondary to decreased cognition  Assessment  Performance deficits / Impairments: Decreased functional mobility , Decreased ROM, Decreased ADL status, Decreased strength, Decreased safe awareness, Decreased endurance, Decreased cognition, Decreased balance, Decreased high-level IADLs, Decreased fine motor control, Decreased coordination, Decreased posture  Treatment Diagnosis: Impaired self-care status  Prognosis: Fair  Decision Making: High Complexity  Discharge Recommendations: Patient would benefit from continued therapy after discharge  OT Equipment Recommendations  Other: TBD  Safety Devices  Type of Devices:  All wendy prominences offloaded, All fall risk precautions in place, Bed alarm in place, Call light within reach, Patient at risk for falls, Left in bed, Sitter present      AM-PAC Daily Activities Inpatient  AM-PAC Daily Activity - Inpatient   How much help is needed for putting on and taking off regular lower body clothing?: A Lot  How much help is needed for bathing (which includes washing, rinsing, drying)?: A Lot  How much help is needed for toileting (which includes using toilet, bedpan, or urinal)?: Total  How much help is needed for putting on and taking off regular upper body clothing?: A Lot  How much help is needed for taking care of personal grooming?: A Little  How much help for eating meals?: Total  AM-PAC Inpatient Daily Activity Raw Score: 11  AM-PAC Inpatient ADL T-Scale Score : 29.04  ADL Inpatient CMS 0-100% Score: 70.42  ADL Inpatient CMS G-Code Modifier : CL    OT Minutes  OT Individual Minutes  Time In: 1120  Time Out: 121 [facility]  Minutes: [address] [facility], [facility]

## 2023-03-22 NOTE — PROGRESS NOTES
([facility] 12/23/2020    Tubular adenoma of colon 2016, 2018    Vitamin D deficiency     Wears glasses         Plan:        Anemia s/p EGD with swelling and bleeding at GE junction, possible varix, was clipped x 3  No overt bleeding  Hgb stable, 7.7  TIPS appears patent on US  Continue PPI  Monitor for bleeding  Trend H&H  Transfuse for hgb < 7  On thiamine  Abx continued  Psych was consulted  Currently on Librium, dose decreased to 5 mg TID  Advised to limit Ativan as much as possible, to not give more than 1 mg at a time if necessary      Time spent reviewing the chart, seeing the patient, and discussing with the attending MD around 30 minutes. Explained to the patient and d/W Nursing Staff  Will F/U with you  Please call or Page for any issues or change in status  Thanks    This note is created with the assistance of the speech recognition program.  While intending to generate a document that actually reflects the content of the visit, document can still have some errors including those of syntax and sound like substitutions which may escape proof reading. Actual meaning can be extrapolated by contextual diversion. Electronically signed by MASSIMO Rodriges NP on 3/22/2023 at 1:27 PM     GI attending physician note. Patient seen with APRN at about 10:45 AM.    I independently performed an evaluation on Mariel Storey. I have reviewed the above documentation completed by the Nurse Practitioner and agree with the history, examination, and management plan. Recommendations discussed. Patient is slightly more awake. Still confused. No abdominal pain. Taking oral feedings. Recommendations: To slowly wean sedatives. Discussed with nursing staff.

## 2023-03-22 NOTE — PROGRESS NOTES
Comprehensive Nutrition Assessment    Type and Reason for Visit:  Initial    Nutrition Recommendations/Plan:   Continue diet and supplements as ordered. Malnutrition Assessment:  Malnutrition Status:  Insufficient data (03/22/23 5247)    Context:  Acute Illness     Findings of the 6 clinical characteristics of malnutrition:  Energy Intake:  Unable to assess  Weight Loss:  Unable to assess     Body Fat Loss:  Unable to assess     Muscle Mass Loss:  Unable to assess    Fluid Accumulation:  No significant fluid accumulation     Strength:  Not Performed    Nutrition Assessment:    Pt has hx of esophageal cancer, cirrhosis, and alcohol abuse he quit in 2019 yet was going through alcohol withdrawal. Pt had gone to ED for dizziness, nausea, extremity weakness and blood in stools. Pt has been anemic in the past and went for EGD on 3-17 which revealed bleeding gastric varix extending from GE junction which was clipped & hemostasis accomplished. Today diet is being changed from full liquid to Regular to start at dinner. RN indicated pt threw up a small amount but consistently drinks the Ensure. Nutrition Related Findings:    No edema. Labs & meds reviewed. TIPS procedure done November 2022. Wound Type: None       Current Nutrition Intake & Therapies:    Average Meal Intake: 26-50%  Average Supplements Intake: %  ADULT DIET; Regular  ADULT ORAL NUTRITION SUPPLEMENT; Breakfast, Dinner, Lunch; Standard High Calorie/High Protein Oral Supplement    Anthropometric Measures:  Height: 5' 10\" (177.8 cm)  Ideal Body Weight (IBW): 166 lbs (75 kg)    Admission Body Weight: 181 lb (82.1 kg)  Current Body Weight: 181 lb (82.1 kg), 109 % IBW. Weight Source: Bed Scale  Current BMI (kg/m2): 26                          BMI Categories: Overweight (BMI 25.0-29. 9)    Estimated Daily Nutrient Needs:  Energy Requirements Based On: Kcal/kg  Weight Used for Energy Requirements: Current  Energy (kcal/day): 2808-5896 kcals based on

## 2023-03-23 PROBLEM — G93.41 ACUTE METABOLIC ENCEPHALOPATHY: Status: ACTIVE | Noted: 2022-12-29

## 2023-03-23 LAB
ANION GAP SERPL CALCULATED.3IONS-SCNC: 12 MMOL/L (ref 9–17)
BNP SERPL-MCNC: 596 PG/ML
BUN SERPL-MCNC: 14 MG/DL (ref 8–23)
CALCIUM SERPL-MCNC: 8 MG/DL (ref 8.6–10.4)
CHLORIDE SERPL-SCNC: 102 MMOL/L (ref 98–107)
CO2 SERPL-SCNC: 22 MMOL/L (ref 20–31)
CREAT SERPL-MCNC: 0.78 MG/DL (ref 0.7–1.2)
GFR SERPL CREATININE-BSD FRML MDRD: >60 ML/MIN/1.73M2
GLUCOSE SERPL-MCNC: 144 MG/DL (ref 70–99)
HCT VFR BLD AUTO: 28.9 % (ref 41–53)
HGB BLD-MCNC: 8.7 G/DL (ref 13.5–17.5)
LACTIC ACID, SEPSIS: 2.9 MMOL/L (ref 0.5–1.9)
LACTIC ACID, SEPSIS: 3 MMOL/L (ref 0.5–1.9)
MAGNESIUM SERPL-MCNC: 2.1 MG/DL (ref 1.6–2.6)
MCH RBC QN AUTO: 22.2 PG (ref 26–34)
MCHC RBC AUTO-ENTMCNC: 30 G/DL (ref 31–37)
MCV RBC AUTO: 74.1 FL (ref 80–100)
PDW BLD-RTO: 22 % (ref 11.5–14.9)
PLATELET # BLD AUTO: 98 K/UL (ref 150–450)
PMV BLD AUTO: 7.6 FL (ref 6–12)
POTASSIUM SERPL-SCNC: 4.2 MMOL/L (ref 3.7–5.3)
RBC # BLD: 3.9 M/UL (ref 4.5–5.9)
SODIUM SERPL-SCNC: 136 MMOL/L (ref 135–144)
WBC # BLD AUTO: 12.3 K/UL (ref 3.5–11)

## 2023-03-23 PROCEDURE — 99222 1ST HOSP IP/OBS MODERATE 55: CPT | Performed by: PSYCHIATRY & NEUROLOGY

## 2023-03-23 PROCEDURE — 6370000000 HC RX 637 (ALT 250 FOR IP): Performed by: NURSE PRACTITIONER

## 2023-03-23 PROCEDURE — 97530 THERAPEUTIC ACTIVITIES: CPT

## 2023-03-23 PROCEDURE — 6370000000 HC RX 637 (ALT 250 FOR IP): Performed by: PSYCHIATRY & NEUROLOGY

## 2023-03-23 PROCEDURE — 36415 COLL VENOUS BLD VENIPUNCTURE: CPT

## 2023-03-23 PROCEDURE — 6370000000 HC RX 637 (ALT 250 FOR IP): Performed by: INTERNAL MEDICINE

## 2023-03-23 PROCEDURE — 2060000000 HC ICU INTERMEDIATE R&B

## 2023-03-23 PROCEDURE — 99232 SBSQ HOSP IP/OBS MODERATE 35: CPT | Performed by: INTERNAL MEDICINE

## 2023-03-23 PROCEDURE — 6370000000 HC RX 637 (ALT 250 FOR IP)

## 2023-03-23 PROCEDURE — 85027 COMPLETE CBC AUTOMATED: CPT

## 2023-03-23 PROCEDURE — C9113 INJ PANTOPRAZOLE SODIUM, VIA: HCPCS | Performed by: NURSE PRACTITIONER

## 2023-03-23 PROCEDURE — 83605 ASSAY OF LACTIC ACID: CPT

## 2023-03-23 PROCEDURE — 83735 ASSAY OF MAGNESIUM: CPT

## 2023-03-23 PROCEDURE — 2580000003 HC RX 258: Performed by: NURSE PRACTITIONER

## 2023-03-23 PROCEDURE — 80048 BASIC METABOLIC PNL TOTAL CA: CPT

## 2023-03-23 PROCEDURE — 6360000002 HC RX W HCPCS: Performed by: NURSE PRACTITIONER

## 2023-03-23 PROCEDURE — 83880 ASSAY OF NATRIURETIC PEPTIDE: CPT

## 2023-03-23 PROCEDURE — 99232 SBSQ HOSP IP/OBS MODERATE 35: CPT

## 2023-03-23 PROCEDURE — A4216 STERILE WATER/SALINE, 10 ML: HCPCS | Performed by: NURSE PRACTITIONER

## 2023-03-23 RX ORDER — 0.9 % SODIUM CHLORIDE 0.9 %
500 INTRAVENOUS SOLUTION INTRAVENOUS ONCE
Status: COMPLETED | OUTPATIENT
Start: 2023-03-23 | End: 2023-03-24

## 2023-03-23 RX ORDER — GAUZE BANDAGE 2" X 2"
100 BANDAGE TOPICAL DAILY
Status: DISCONTINUED | OUTPATIENT
Start: 2023-03-23 | End: 2023-03-25 | Stop reason: HOSPADM

## 2023-03-23 RX ORDER — MIDODRINE HYDROCHLORIDE 5 MG/1
5 TABLET ORAL 3 TIMES DAILY PRN
Status: DISCONTINUED | OUTPATIENT
Start: 2023-03-23 | End: 2023-03-25 | Stop reason: HOSPADM

## 2023-03-23 RX ADMIN — LACTULOSE 20 G: 20 SOLUTION ORAL at 22:34

## 2023-03-23 RX ADMIN — SODIUM CHLORIDE, PRESERVATIVE FREE 40 MG: 5 INJECTION INTRAVENOUS at 22:34

## 2023-03-23 RX ADMIN — FUROSEMIDE 40 MG: 40 TABLET ORAL at 17:30

## 2023-03-23 RX ADMIN — SPIRONOLACTONE 50 MG: 25 TABLET ORAL at 09:30

## 2023-03-23 RX ADMIN — SODIUM CHLORIDE, PRESERVATIVE FREE 10 ML: 5 INJECTION INTRAVENOUS at 22:34

## 2023-03-23 RX ADMIN — RISPERIDONE 0.5 MG: 1 TABLET ORAL at 22:26

## 2023-03-23 RX ADMIN — MIDODRINE HYDROCHLORIDE 5 MG: 5 TABLET ORAL at 22:26

## 2023-03-23 RX ADMIN — METOPROLOL TARTRATE 25 MG: 25 TABLET, FILM COATED ORAL at 09:31

## 2023-03-23 RX ADMIN — FUROSEMIDE 40 MG: 40 TABLET ORAL at 09:32

## 2023-03-23 RX ADMIN — RISPERIDONE 0.5 MG: 1 TABLET ORAL at 09:32

## 2023-03-23 RX ADMIN — CHLORDIAZEPOXIDE HYDROCHLORIDE 5 MG: 5 CAPSULE ORAL at 09:29

## 2023-03-23 RX ADMIN — ACETAMINOPHEN 650 MG: 325 TABLET ORAL at 12:44

## 2023-03-23 RX ADMIN — LACTULOSE 20 G: 20 SOLUTION ORAL at 09:29

## 2023-03-23 RX ADMIN — THIAMINE HCL TAB 100 MG 100 MG: 100 TAB at 13:39

## 2023-03-23 RX ADMIN — SODIUM CHLORIDE 500 ML: 9 INJECTION, SOLUTION INTRAVENOUS at 22:24

## 2023-03-23 RX ADMIN — SODIUM CHLORIDE, PRESERVATIVE FREE 40 MG: 5 INJECTION INTRAVENOUS at 12:36

## 2023-03-23 RX ADMIN — FERROUS SULFATE TAB 325 MG (65 MG ELEMENTAL FE) 325 MG: 325 (65 FE) TAB at 09:31

## 2023-03-23 RX ADMIN — ATORVASTATIN CALCIUM 20 MG: 20 TABLET, FILM COATED ORAL at 22:26

## 2023-03-23 ASSESSMENT — PAIN DESCRIPTION - LOCATION: LOCATION: HEAD;ABDOMEN

## 2023-03-23 ASSESSMENT — PAIN SCALES - GENERAL: PAINLEVEL_OUTOF10: 9

## 2023-03-23 ASSESSMENT — PAIN DESCRIPTION - ORIENTATION: ORIENTATION: RIGHT;MID

## 2023-03-23 ASSESSMENT — PAIN DESCRIPTION - DESCRIPTORS: DESCRIPTORS: ACHING

## 2023-03-23 NOTE — PROGRESS NOTES
is able to assist pt with brief change/bed sheet change post therapy tx.)     PT Exercises  Exercise Treatment: performed bed mobility with increase time to complete all tasks. Static Sitting Balance Exercises: Static/dyn sitting x10min  Dynamic Sitting Balance Exercises: Sitting dyn functional act's. and instructed in posterior/lateral leans. Pt requires SBA - Min A x1 for sitting balance. Pt requires increase time to follow direction. Safety Devices  Type of Devices: All wendy prominences offloaded; All fall risk precautions in place; Bed alarm in place;Call light within reach; Patient at risk for falls; Left in bed;Sitter present   [facility] Basic Mobility - Inpatient   How much help is needed turning from your back to your side while in a flat bed without using bedrails?: A Little  How much help is needed moving from lying on your back to sitting on the side of a flat bed without using bedrails?: A Lot  How much help is needed moving to and from a bed to a chair?: Total  How much help is needed standing up from a chair using your arms?: Total  How much help is needed walking in hospital room?: Total  How much help is needed climbing 3-5 steps with a railing?: Total  AM-PAC Inpatient Mobility Raw Score : 9  AM-PAC Inpatient T-Scale Score : 30.55  Mobility Inpatient CMS 0-100% Score: 81.38  Mobility Inpatient CMS G-Code Modifier : CM      Goals  Short Term Goals  Time Frame for Short Term Goals: 7 visits - UPDATED GOALS 3/21/23  Short Term Goal 1: Pt to perform bed mobility minx1. Short Term Goal 2: Pt to perform transfers Waneta Madhav stedy vs RW) min to mod x1. Short Term Goal 3: Pt to tolerate sitting up in recliner for 30-45 minutes. Short Term Goal 4: Pt to improve BLE strength by 1/2 MMG. Short Term Goal 5: pt to tolerate 30-45 minute PT session with active participation. Additional Goals?: Yes  Short Term Goal 6: Pt to sit edge of bed CGA/min x1 for up to 5 minutes, FAIR posture and sitting balance.   Patient Goals   Patient Goals : to go back home    Education  Patient Education  Education Given To: Patient  Education Provided: Role of Therapy;Plan of Care;Precautions  Education Method: Verbal  Barriers to Learning: Cognition  Education Outcome: Continued education needed    Therapy Time   Individual Concurrent Group Co-treatment   Time In 1018         Time Out 1046         Minutes [address] [place], [place]

## 2023-03-23 NOTE — CONSULTS
[facility] Neurology   IN-PATIENT SERVICE      NEUROLOGY CONSULT  NOTE            Date:   3/23/2023  Patient name:  Yaneth Akins  Date of admission:  3/16/2023  YOB: 1960      Chief Complaint:     Chief Complaint   Patient presents with    Dizziness    Nausea    Extremity Weakness       Reason for Consult: Altered mental status    History of Present Illness: The patient is a 60 y.o. male who presents with Dizziness, Nausea, and Extremity Weakness  . The patient was seen and examined and the chart was reviewed. Patient presents to the hospital on 3/16 with hematemesis, melena, new onset A-fib. Found to have esophageal varices that were bleeding which were subsequently clipped by GI. He started to develop alcohol withdrawal symptoms 72 hours into his stay and went into delirium tremens. He has been managed with Ativan, Librium and also Risperdal.  CT head was done showing no acute intracranial abnormalities. Reportedly has been improving slightly each day. This morning was not as coherent as he had been yesterday early on but it appears as the day has gone on patient is improving and answering questions appropriately. He is lying in bed slightly drowsy. He is able to answer all my questions correctly although speech is very soft-spoken and slightly difficult to understand. He is able to follow all commands and move all 4 extremities symmetrically. Librium has since been discontinued. Risperdal is being given 0.5 mg twice daily. IV thiamine 500 mg 3 times daily was given for 3 days.     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)     Alcoholic cirrhosis of liver with ascites (HCC)     Anemia 04/13/2022    Anxiety     Arthritis     Back pain, EGD ESOPHAGOGASTRODUODENOSCOPY performed by Ciara Hall MD at [facility] 01/03/2023    EGD ESOPHAGOGASTRODUODENOSCOPY performed by Radha Davis MD at [facility] 01/26/2023    EGD CONTROL HEMORRHAGE performed by Heath Teague MD at [address] [place] [address] 2/13/2023    EGD WITH APC GOLD PROBE performed by Heath Teague MD at [address] [place] [address] 3/17/2023    EGD WITH RESOLUTION CLIP APPLICATION X3 performed by Aliza Barr MD at [address] [place] [address]          Medications Prior to Admission:     Prior to Admission medications    Medication Sig Start Date End Date Taking? Authorizing Provider   lactulose (CHRONULAC) 10 GM/15ML solution take 30 milliliters by mouth three times a day 2/19/23   MASSIMO Sevilla NP   spironolactone (ALDACTONE) 25 MG tablet Take 2 tablets by mouth daily 2/7/23   MASSIMO Sevilla NP   sucralfate (CARAFATE) 1 GM tablet Take 1 tablet by mouth 4 times daily 1/28/23   Chrissie Angulo MD   furosemide (LASIX) 40 MG tablet Take 1 tablet by mouth in the morning and 1 tablet in the evening. 1/11/23   Danetta Ormond, DO   pantoprazole (PROTONIX) 40 MG tablet Take 40 mg by mouth daily 10/4/22   Historical Provider, MD   FEROSUL 325 (65 Fe) MG tablet take 1 tablet by mouth twice a day 10/7/22   VASU Kim   atorvastatin (LIPITOR) 20 MG tablet Take 1 tablet by mouth nightly 4/21/22   Dionte Galarza MD        Allergies:     Patient has no known allergies. Social History:     Tobacco:    reports that he quit smoking about 6 years ago. His smoking use included cigarettes. He has a 45.00 pack-year smoking history. He has never used smokeless tobacco.  Alcohol:      reports that he does not currently use alcohol.   Drug Use:  reports that he does not currently use drugs after having used the following drugs:

## 2023-03-23 NOTE — PLAN OF CARE
Problem: Safety - Adult  Goal: Free from fall injury  Outcome: Progressing     Problem: Discharge Planning  Goal: Discharge to home or other facility with appropriate resources  Outcome: Progressing     Problem: ABCDS Injury Assessment  Goal: Absence of physical injury  Outcome: Progressing     Problem: Pain  Goal: Verbalizes/displays adequate comfort level or baseline comfort level  Outcome: Progressing     Problem: Skin/Tissue Integrity  Goal: Absence of new skin breakdown  Description: 1. Monitor for areas of redness and/or skin breakdown  2. Assess vascular access sites hourly  3. Every 4-6 hours minimum:  Change oxygen saturation probe site  4. Every 4-6 hours:  If on nasal continuous positive airway pressure, respiratory therapy assess nares and determine need for appliance change or resting period.   Outcome: Progressing

## 2023-03-23 NOTE — PLAN OF CARE
Problem: Safety - Adult  Goal: Free from fall injury  3/23/2023 0407 by Elizabeth French RN  Outcome: Progressing     Problem: Discharge Planning  Goal: Discharge to home or other facility with appropriate resources  3/23/2023 0407 by Elizabeth French RN  Outcome: Progressing     Problem: Pain  Goal: Verbalizes/displays adequate comfort level or baseline comfort level  3/23/2023 0407 by Elizabeth French RN  Outcome: Progressing     Problem: Skin/Tissue Integrity  Goal: Absence of new skin breakdown  Description: 1. Monitor for areas of redness and/or skin breakdown  2. Assess vascular access sites hourly  3. Every 4-6 hours minimum:  Change oxygen saturation probe site  4. Every 4-6 hours:  If on nasal continuous positive airway pressure, respiratory therapy assess nares and determine need for appliance change or resting period. 3/23/2023 0407 by Elizabeth French RN  Outcome: Progressing     Problem: Safety - Medical Restraint  Goal: Remains free of injury from restraints (Restraint for Interference with Medical Device)  Description: INTERVENTIONS:  1. Determine that other, less restrictive measures have been tried or would not be effective before applying the restraint  2. Evaluate the patient's condition at the time of restraint application  3. Inform patient/family regarding the reason for restraint  4.  Q2H: Monitor safety, psychosocial status, comfort, nutrition and hydration  3/23/2023 0407 by Elizabeth French RN  Outcome: Progressing

## 2023-03-23 NOTE — PROGRESS NOTES
and independently complete self care tasks           Balance  Balance  Sitting Balance: Minimal assistance (Min- SBA due to posterior lean)  Standing Balance: Unable to assess(comment)  Standing Balance  Comment: JASON - safety concerns    Transfers/Mobility  Bed mobility  Supine to Sit: Moderate assistance (assist with trunk and scooting legs to EOB)  Sit to Supine: Minimal assistance (assist for advancing legs into bed)  Bed Mobility Comments: HOB slightly elevated, using hand rails    Functional Mobility  Functional Mobility Comments: JASON - safety concerns             Patient Education  Patient Education  Education Given To: Patient  Education Provided: Role of Therapy, Plan of Care, ADL Adaptive Strategies  Education Method: Verbal  Barriers to Learning: Cognition  Education Outcome: Continued education needed      Goals  Short Term Goals  Time Frame for Short Term Goals: By discharge  Short Term Goal 1: Patient will perform bed mobility with Mod A x2 to sit EOB for 5+ minutes during self-care/functional activity of choice  Short Term Goal 2: Patient will actively participate in 15+ minutes of self-care to the best of patient's ability with Minimal verbal cues for attention and safety  Short Term Goal 3: Patient participate in grooming with Cachil DeHe assist and <5 cues  Short Term Goal 4: Patient will participate in 15+ minutes of therapeutic exercise/functional activity to improve safety and independence with self-care  Short Term Goal 5: OTR to further assess transfers as able  Occupational Therapy Plan  Times Per Week: 4-6  Times Per Day:  Once a day  Current Treatment Recommendations: Self-Care / ADL, Strengthening, ROM, Balance training, Functional mobility training, Endurance training, Cognitive reorientation, Pain management, Safety education & training, Patient/Caregiver education & training, Equipment evaluation, education, & procurement, Positioning, Home management training, Coordination training      Assessment  Activity Tolerance  Activity Tolerance: Treatment limited secondary to decreased cognition  Assessment  Performance deficits / Impairments: Decreased functional mobility , Decreased ROM, Decreased ADL status, Decreased strength, Decreased safe awareness, Decreased endurance, Decreased cognition, Decreased balance, Decreased high-level IADLs, Decreased fine motor control, Decreased coordination, Decreased posture  Assessment: Pt agreeable to therapy, once seated EOB pts bp taken ( 71/62) Pt states he was dizzy at this time. Pt EOB for 10 min before requiring to lay back in bed. Pt laid self down with minimal assist from writer to advance legs. Pt having BM at this time, tx terminated due to nursing staff in room for hygiene  Treatment Diagnosis: Impaired self-care status  Prognosis: Fair  Decision Making: High Complexity  Discharge Recommendations: Patient would benefit from continued therapy after discharge  OT Equipment Recommendations  Other: TBD  Safety Devices  Type of Devices:  All wendy prominences offloaded, All fall risk precautions in place, Bed alarm in place, Call light within reach, Patient at risk for falls, Left in bed, Sitter present      AM-[facility] Daily Activities Inpatient  AM-[facility]AC Daily Activity - Inpatient   How much help is needed for putting on and taking off regular lower body clothing?: A Lot  How much help is needed for bathing (which includes washing, rinsing, drying)?: A Lot  How much help is needed for toileting (which includes using toilet, bedpan, or urinal)?: Total  How much help is needed for putting on and taking off regular upper body clothing?: A Lot  How much help is needed for taking care of personal grooming?: A Little  How much help for eating meals?: Total  AM-PAC Inpatient Daily Activity Raw Score: 11  AM-[facility]AC Inpatient ADL T-Scale Score : 29.04  ADL Inpatient CMS 0-100% Score: 70.42  ADL Inpatient CMS G-Code Modifier : CL    OT Minutes  OT Individual

## 2023-03-23 NOTE — PROGRESS NOTES
03/23/2023 10:26 AM    CO2 22 03/23/2023 10:26 AM    BUN 14 03/23/2023 10:26 AM    CREATININE 0.78 03/23/2023 10:26 AM    GFRAA >60 09/28/2022 01:33 PM    LABGLOM >60 03/23/2023 10:26 AM    GLUCOSE 144 03/23/2023 10:26 AM    GLUCOSE 65 04/19/2012 11:30 AM    PROT 6.1 03/22/2023 09:27 AM    LABALBU 2.9 03/22/2023 09:27 AM    LABALBU 4.7 04/19/2012 11:30 AM    CALCIUM 8.0 03/23/2023 10:26 AM    BILITOT 2.7 03/22/2023 09:27 AM    ALKPHOS 122 03/22/2023 09:27 AM    AST 30 03/22/2023 09:27 AM    ALT 8 03/22/2023 09:27 AM     BMP:    Lab Results   Component Value Date/Time     03/23/2023 10:26 AM    K 4.2 03/23/2023 10:26 AM     03/23/2023 10:26 AM    CO2 22 03/23/2023 10:26 AM    BUN 14 03/23/2023 10:26 AM    LABALBU 2.9 03/22/2023 09:27 AM    LABALBU 4.7 04/19/2012 11:30 AM    CREATININE 0.78 03/23/2023 10:26 AM    CALCIUM 8.0 03/23/2023 10:26 AM    GFRAA >60 09/28/2022 01:33 PM    LABGLOM >60 03/23/2023 10:26 AM    GLUCOSE 144 03/23/2023 10:26 AM    GLUCOSE 65 04/19/2012 11:30 AM     PT/INR:    Lab Results   Component Value Date/Time    PROTIME 18.7 03/22/2023 09:27 AM    INR 1.5 03/22/2023 09:27 AM     PTT:    Lab Results   Component Value Date/Time    APTT 33.2 02/24/2023 12:34 PM   [APTT}    Physical Examination:        General appearance: alert, cooperative and no distress  Mental Status: oriented to person, place and time and normal affect  Lungs: clear to auscultation bilaterally, normal effort  Heart: regular rate and rhythm, no murmur,  Abdomen: soft, nontender, nondistended, bowel sounds present all four quadrants, no masses, hepatomegaly or splenomegaly  Extremities: no edema, redness or tenderness in the calves  Skin: no gross lesions, rashes, or induration    Assessment:        Primary Problem  Anemia     Active Hospital Problems    Diagnosis Date Noted    Alcohol withdrawal syndrome, with delirium ([facility] [F10.931] 03/21/2023     Priority: Medium    Lightheadedness [R42] 03/17/2023 Priority: Medium    Acute metabolic encephalopathy [O20.05] 12/29/2022     Priority: Medium    Anemia [D64.9] 04/13/2022     Past Medical History:   Diagnosis Date    Abnormal EKG     Acute deep vein thrombosis (DVT) of brachial vein of right upper extremity ([address] 01/07/2023    Also- Superficial venous thrombosis of the cephalic vein in the forearm    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 Scarlet Menchaca, orthopedic, every 3-4 months, gets steroid injection    Lezama esophagus     Bleeding gastric varices 12/29/2022    BPH (benign prostatic hypertrophy)     Cholelithiasis     Cirrhosis ([address]     COVID-19 12/2020    pt reports he had a positive test while at [facility] in 2020, was asymptomatic    COVID-19 vaccine series completed 5/20/2021, 6/22/2021    Moderna 5/20/2021, 6/22/2021    DDD (degenerative disc disease), lumbar     Depression     Esophageal cancer ([address]     INVASIVE ADENOCARCINOMA ARISING IN TUBULAR ADENOMA WITH HIGH GRADE DYSPLASIA, ASSOCIATED WITH FOCAL INTESTINAL METAPLASIA     Esophageal varices ([address]     Fatty liver     GERD (gastroesophageal reflux disease)     GI bleed     Heart murmur     Hep C w/o coma, chronic ([address]     Hiatal hernia     History of alcohol abuse     6-12 beers a day; quit drinking 2019    History of blood transfusion     History of colon polyps 2016    History of tobacco abuse     [place] (hard of hearing)     Hyperlipidemia     Hypertension     Hyponatremia 07/20/2016    Hypotension 12/20/2021    Mastoid disorder, bilateral 12/31/2022    biLateral mastoid effusion    Pericardial effusion     PONV (postoperative nausea and vomiting)     Poor venous access     has port in place.     Port-A-Cath in place     right upper chest    Portal hypertension ([address]     Sciatica     Secondary esophageal varices ([address] 06/07/2022    Shortness of breath     Spinal stenosis

## 2023-03-23 NOTE — PROGRESS NOTES
[place] [address] Internal Medicine  [facility] Internal Medicine  Moises Garcia MD; Juan Manuel Romero MD; Rosana Duvall MD; MD Keith Ortega MD; Jolie Cruz MD; Pauline Farias MD        Progress Note    3/23/2023    11:21 AM    Name:   Sang Ramsey  MRN:     [id number]     Acct:      [de-identified]   Room:   2119/2119-01  IP Day:  7  Admit Date:  3/16/2023  8:24 AM    PCP:   VASU Reddy  Code Status:  Full Code    Subjective:     C/C:   Chief Complaint   Patient presents with    Dizziness    Nausea    Extremity Weakness         Interval History Status: Improving    HPI:     59-year-old male history of alcoholic liver cirrhosis, variceal bleeds TIPS presented with hematemesis and melena, new onset A-fib. Treated with PPI drip, octreotide, CIWA protocol  GI was consulted. IR paracentesis was attempted but did not yield enough fluid-transfer to [facility] was consulted for taking TIPS patency with IR however patient became delirious due to alcohol withdrawals and transfer was canceled. EGD revealed bleeding varix extending from GE junction which was clipped and hemostasis accomplished  Cardiology consulted for new onset A-fib  Hemoglobin remained stable    Has been more confused through 3/19-IV thiamine was added for concern of Warnicke's encephalopathy. Patient placed on scheduled Librium, needing as needed Ativan on top of it    Review of Systems:     Unable to get due to patient's mental state      Medications:      Allergies:  No Known Allergies    Current Meds:   Scheduled Meds:    chlordiazePOXIDE  5 mg Oral TID    risperiDONE  0.5 mg Oral BID    pantoprazole (PROTONIX) 40 mg injection  40 mg IntraVENous Q12H    sodium chloride flush  5-40 mL IntraVENous 2 times per day    metoprolol tartrate  25 mg Oral BID    scopolamine  1 patch TransDERmal Q72H    atorvastatin  20 mg Oral Nightly    ferrous sulfate  325 mg Oral Daily with breakfast    furosemide  40 Cocaine. Frequency: 1.00 time per week. Family History:   Family History   Problem Relation Age of Onset    Cancer Mother         pancreatic    Cancer Father         bone    Diabetes Sister     Asthma Brother     Allergies Brother         MULTIPLE       Vitals:  /61   Pulse 74   Temp 98.1 °F (36.7 °C)   Resp 18   Ht 5' 10\" (1.778 m)   Wt 181 lb 14.1 oz (82.5 kg)   SpO2 93%   BMI 26.10 kg/m²   Temp (24hrs), Av.6 °F (37 °C), Min:98.1 °F (36.7 °C), Max:99.3 °F (37.4 °C)    No results for input(s): POCGLU in the last 72 hours. I/O (24Hr): No intake or output data in the 24 hours ending 23 1121      Labs:    Lab Results   Component Value Date    WBC 12.3 (H) 2023    HGB 8.7 (L) 2023    HCT 28.9 (L) 2023    MCV 74.1 (L) 2023    PLT 98 (L) 2023     Lab Results   Component Value Date/Time     2023 10:26 AM    K 4.2 2023 10:26 AM     2023 10:26 AM    CO2 22 2023 10:26 AM    BUN 14 2023 10:26 AM    CREATININE 0.78 2023 10:26 AM    GLUCOSE 144 2023 10:26 AM    GLUCOSE 65 2012 11:30 AM    CALCIUM 8.0 2023 10:26 AM          Lab Results   Component Value Date/Time    SPECIAL RT HAND 5ML 2023 02:36 PM     Lab Results   Component Value Date/Time    CULTURE NO GROWTH 6 DAYS 2023 11:20 AM         Radiology:    Recent data reviewed    Physical Examination:        General appearance:  alert, cooperative and no distress  Eyes: Anicteric sclera. Pupils are equally round and reactive to light. Extraocular movements are intact.   Lungs:  clear to auscultation bilaterally, normal effort  Heart:  regular rate and rhythm, no murmur  Abdomen:  soft, nontender, nondistended, normal bowel sounds, no masses, hepatomegaly, splenomegaly  Extremities:  no edema, redness, tenderness in the calves  Skin:  no gross lesions, rashes, induration  Neuro: Appears confused,, oriented x1-knows he is in [place],, no

## 2023-03-23 NOTE — PROGRESS NOTES
location, time, and situation  Memory: impaired   Insight: poor   Judgement: poor       Data   height is 5' 10\" (1.778 m) and weight is 181 lb 14.1 oz (82.5 kg). His temperature is 98.1 °F (36.7 °C). His blood pressure is 101/61 and his pulse is 74. His respiration is 18 and oxygen saturation is 93%. Labs:   No results displayed because visit has over 200 results. Reviewed patient's current plan of care and vital signs.     Labs reviewed: [x] Yes    Medications  Current Facility-Administered Medications: chlordiazePOXIDE (LIBRIUM) capsule 5 mg, 5 mg, Oral, TID  potassium chloride (KLOR-CON M) extended release tablet 40 mEq, 40 mEq, Oral, PRN **OR** potassium bicarb-citric acid (EFFER-K) effervescent tablet 40 mEq, 40 mEq, Oral, PRN **OR** potassium chloride 10 mEq/100 mL IVPB (Peripheral Line), 10 mEq, IntraVENous, PRN  risperiDONE (RISPERDAL) tablet 0.5 mg, 0.5 mg, Oral, BID  pantoprazole (PROTONIX) 40 mg in sodium chloride (PF) 0.9 % 10 mL injection, 40 mg, IntraVENous, Q12H  LORazepam (ATIVAN) injection 1 mg, 1 mg, IntraVENous, Q4H PRN  sodium chloride flush 0.9 % injection 5-40 mL, 5-40 mL, IntraVENous, 2 times per day  sodium chloride flush 0.9 % injection 5-40 mL, 5-40 mL, IntraVENous, PRN  0.9 % sodium chloride infusion, , IntraVENous, PRN  promethazine (PHENERGAN) injection 25 mg, 25 mg, IntraMUSCular, Q6H PRN  metoprolol tartrate (LOPRESSOR) tablet 25 mg, 25 mg, Oral, BID  scopolamine (TRANSDERM-SCOP) transdermal patch 1 patch, 1 patch, TransDERmal, Q72H  melatonin tablet 3 mg, 3 mg, Oral, Nightly PRN  polyethylene glycol (GLYCOLAX) packet 17 g, 17 g, Oral, Daily PRN  acetaminophen (TYLENOL) tablet 650 mg, 650 mg, Oral, Q6H PRN **OR** acetaminophen (TYLENOL) suppository 650 mg, 650 mg, Rectal, Q6H PRN  magnesium sulfate 2000 mg in water 50 mL IVPB, 2,000 mg, IntraVENous, PRN  atorvastatin (LIPITOR) tablet 20 mg, 20 mg, Oral, Nightly  ferrous sulfate (IRON 325) tablet 325 mg, 325 mg, Oral, Daily with breakfast  furosemide (LASIX) tablet 40 mg, 40 mg, Oral, BID  spironolactone (ALDACTONE) tablet 50 mg, 50 mg, Oral, Daily  lactulose (CHRONULAC) 10 GM/15ML solution 20 g, 20 g, Oral, TID  morphine (PF) injection 1 mg, 1 mg, IntraVENous, Q4H PRN  metoprolol (LOPRESSOR) injection 5 mg, 5 mg, IntraVENous, Q6H PRN      ASSESSMENT    DSM-5 Diagnosis:    Delirium due to underlying medical condition    Patient Active Problem List   Diagnosis    Back pain, chronic    Hearing difficulty    GERD (gastroesophageal reflux disease)    Cervical radicular pain    Hypomagnesemia    Chronic viral hepatitis B without delta agent and without coma (HCC)    Calculus of gallbladder without cholecystitis    Hep C w/o coma, chronic (HCC)    Fatty liver    Psychophysiologic insomnia    Cirrhosis (HCC)    Decompensation of cirrhosis of liver (HCC)    Vertebrogenic low back pain    DDD (degenerative disc disease), lumbar    Depression    Tubular adenoma of colon    History of colon polyps    Gynecomastia, male    Lumbar radiculitis    Lumbar disc herniation    Tinnitus    Eustachian tube dysfunction    Ganglion cyst    Carpal tunnel syndrome of right wrist    History of hepatitis C    Vitamin D deficiency    Pure hypercholesterolemia    Acute hypokalemia    Essential hypertension    Recurrent major depressive disorder in partial remission (HCC)    S/P epidural steroid injection    Elevated LFTs    Seasonal allergies    Lumbar facet arthropathy    Cervical facet syndrome    Thrombocytopenia (HCC)    Hepatitis C virus infection resolved after antiviral drug therapy    Alcohol abuse    Altered mental status    Hypocalcemia    Hypophosphatemia    Malignant neoplasm of lower third of esophagus (HCC)    COVID-19    Anxiety    Current smoker    Severe comorbid illness    Gait instability    Abnormal findings on diagnostic imaging of spine    Cervical spinal stenosis    Spinal stenosis of lumbar region with neurogenic claudication    Low

## 2023-03-24 ENCOUNTER — APPOINTMENT (OUTPATIENT)
Dept: CT IMAGING | Age: 63
DRG: 432 | End: 2023-03-24
Payer: COMMERCIAL

## 2023-03-24 ENCOUNTER — APPOINTMENT (OUTPATIENT)
Dept: INTERVENTIONAL RADIOLOGY/VASCULAR | Age: 63
End: 2023-03-24
Payer: COMMERCIAL

## 2023-03-24 LAB
ANION GAP SERPL CALCULATED.3IONS-SCNC: 8 MMOL/L (ref 9–17)
BUN SERPL-MCNC: 19 MG/DL (ref 8–23)
CALCIUM SERPL-MCNC: 8.2 MG/DL (ref 8.6–10.4)
CHLORIDE SERPL-SCNC: 102 MMOL/L (ref 98–107)
CO2 SERPL-SCNC: 25 MMOL/L (ref 20–31)
CREAT SERPL-MCNC: 0.94 MG/DL (ref 0.7–1.2)
GFR SERPL CREATININE-BSD FRML MDRD: >60 ML/MIN/1.73M2
GLUCOSE SERPL-MCNC: 130 MG/DL (ref 70–99)
HCT VFR BLD AUTO: 25.8 % (ref 41–53)
HGB BLD-MCNC: 7.8 G/DL (ref 13.5–17.5)
LACTATE PLASV-SCNC: 2.7 MMOL/L (ref 0.5–2.2)
LACTIC ACID, SEPSIS: 2.3 MMOL/L (ref 0.5–1.9)
MCH RBC QN AUTO: 21.8 PG (ref 26–34)
MCHC RBC AUTO-ENTMCNC: 30.1 G/DL (ref 31–37)
MCV RBC AUTO: 72.5 FL (ref 80–100)
PDW BLD-RTO: 21.8 % (ref 11.5–14.9)
PLATELET # BLD AUTO: 102 K/UL (ref 150–450)
PMV BLD AUTO: 8.5 FL (ref 6–12)
POTASSIUM SERPL-SCNC: 3.6 MMOL/L (ref 3.7–5.3)
RBC # BLD: 3.56 M/UL (ref 4.5–5.9)
SODIUM SERPL-SCNC: 135 MMOL/L (ref 135–144)
WBC # BLD AUTO: 13.4 K/UL (ref 3.5–11)

## 2023-03-24 PROCEDURE — 6370000000 HC RX 637 (ALT 250 FOR IP): Performed by: PSYCHIATRY & NEUROLOGY

## 2023-03-24 PROCEDURE — 85027 COMPLETE CBC AUTOMATED: CPT

## 2023-03-24 PROCEDURE — 2060000000 HC ICU INTERMEDIATE R&B

## 2023-03-24 PROCEDURE — 80048 BASIC METABOLIC PNL TOTAL CA: CPT

## 2023-03-24 PROCEDURE — 6360000002 HC RX W HCPCS: Performed by: NURSE PRACTITIONER

## 2023-03-24 PROCEDURE — 99232 SBSQ HOSP IP/OBS MODERATE 35: CPT | Performed by: PSYCHIATRY & NEUROLOGY

## 2023-03-24 PROCEDURE — 99233 SBSQ HOSP IP/OBS HIGH 50: CPT | Performed by: INTERNAL MEDICINE

## 2023-03-24 PROCEDURE — 6370000000 HC RX 637 (ALT 250 FOR IP)

## 2023-03-24 PROCEDURE — C9113 INJ PANTOPRAZOLE SODIUM, VIA: HCPCS | Performed by: NURSE PRACTITIONER

## 2023-03-24 PROCEDURE — A4216 STERILE WATER/SALINE, 10 ML: HCPCS | Performed by: NURSE PRACTITIONER

## 2023-03-24 PROCEDURE — 6370000000 HC RX 637 (ALT 250 FOR IP): Performed by: INTERNAL MEDICINE

## 2023-03-24 PROCEDURE — 6370000000 HC RX 637 (ALT 250 FOR IP): Performed by: NURSE PRACTITIONER

## 2023-03-24 PROCEDURE — 2500000003 HC RX 250 WO HCPCS: Performed by: NURSE PRACTITIONER

## 2023-03-24 PROCEDURE — 83605 ASSAY OF LACTIC ACID: CPT

## 2023-03-24 PROCEDURE — 99232 SBSQ HOSP IP/OBS MODERATE 35: CPT

## 2023-03-24 PROCEDURE — 2580000003 HC RX 258: Performed by: NURSE PRACTITIONER

## 2023-03-24 PROCEDURE — 74177 CT ABD & PELVIS W/CONTRAST: CPT

## 2023-03-24 PROCEDURE — 36415 COLL VENOUS BLD VENIPUNCTURE: CPT

## 2023-03-24 PROCEDURE — 6360000004 HC RX CONTRAST MEDICATION: Performed by: NURSE PRACTITIONER

## 2023-03-24 RX ORDER — MIRTAZAPINE 15 MG/1
7.5 TABLET, FILM COATED ORAL NIGHTLY
Status: DISCONTINUED | OUTPATIENT
Start: 2023-03-24 | End: 2023-03-25 | Stop reason: HOSPADM

## 2023-03-24 RX ORDER — MIDODRINE HYDROCHLORIDE 10 MG/1
10 TABLET ORAL ONCE
Status: COMPLETED | OUTPATIENT
Start: 2023-03-24 | End: 2023-03-24

## 2023-03-24 RX ORDER — 0.9 % SODIUM CHLORIDE 0.9 %
500 INTRAVENOUS SOLUTION INTRAVENOUS ONCE
Status: COMPLETED | OUTPATIENT
Start: 2023-03-24 | End: 2023-03-24

## 2023-03-24 RX ORDER — 0.9 % SODIUM CHLORIDE 0.9 %
100 INTRAVENOUS SOLUTION INTRAVENOUS ONCE
Status: COMPLETED | OUTPATIENT
Start: 2023-03-24 | End: 2023-03-24

## 2023-03-24 RX ORDER — SODIUM CHLORIDE 0.9 % (FLUSH) 0.9 %
10 SYRINGE (ML) INJECTION PRN
Status: DISCONTINUED | OUTPATIENT
Start: 2023-03-24 | End: 2023-03-25 | Stop reason: HOSPADM

## 2023-03-24 RX ADMIN — SODIUM CHLORIDE, PRESERVATIVE FREE 10 ML: 5 INJECTION INTRAVENOUS at 23:43

## 2023-03-24 RX ADMIN — ACETAMINOPHEN 650 MG: 325 TABLET ORAL at 23:41

## 2023-03-24 RX ADMIN — SODIUM CHLORIDE 100 ML: 9 INJECTION, SOLUTION INTRAVENOUS at 15:15

## 2023-03-24 RX ADMIN — FERROUS SULFATE TAB 325 MG (65 MG ELEMENTAL FE) 325 MG: 325 (65 FE) TAB at 09:57

## 2023-03-24 RX ADMIN — SODIUM CHLORIDE, PRESERVATIVE FREE 40 MG: 5 INJECTION INTRAVENOUS at 23:43

## 2023-03-24 RX ADMIN — RISPERIDONE 0.5 MG: 1 TABLET ORAL at 09:57

## 2023-03-24 RX ADMIN — IOPAMIDOL 75 ML: 755 INJECTION, SOLUTION INTRAVENOUS at 15:15

## 2023-03-24 RX ADMIN — FUROSEMIDE 40 MG: 40 TABLET ORAL at 10:06

## 2023-03-24 RX ADMIN — SPIRONOLACTONE 50 MG: 25 TABLET ORAL at 09:57

## 2023-03-24 RX ADMIN — SODIUM CHLORIDE, PRESERVATIVE FREE 10 ML: 5 INJECTION INTRAVENOUS at 09:57

## 2023-03-24 RX ADMIN — THIAMINE HCL TAB 100 MG 100 MG: 100 TAB at 09:57

## 2023-03-24 RX ADMIN — ATORVASTATIN CALCIUM 20 MG: 20 TABLET, FILM COATED ORAL at 23:42

## 2023-03-24 RX ADMIN — LACTULOSE 20 G: 20 SOLUTION ORAL at 09:57

## 2023-03-24 RX ADMIN — MIDODRINE HYDROCHLORIDE 10 MG: 10 TABLET ORAL at 02:36

## 2023-03-24 RX ADMIN — LACTULOSE 20 G: 20 SOLUTION ORAL at 12:42

## 2023-03-24 RX ADMIN — RISPERIDONE 0.5 MG: 1 TABLET ORAL at 23:42

## 2023-03-24 RX ADMIN — SODIUM CHLORIDE, PRESERVATIVE FREE 40 MG: 5 INJECTION INTRAVENOUS at 11:49

## 2023-03-24 RX ADMIN — SODIUM CHLORIDE, PRESERVATIVE FREE 10 ML: 5 INJECTION INTRAVENOUS at 15:15

## 2023-03-24 RX ADMIN — LACTULOSE 20 G: 20 SOLUTION ORAL at 23:42

## 2023-03-24 RX ADMIN — BARIUM SULFATE 450 ML: 20 SUSPENSION ORAL at 12:40

## 2023-03-24 RX ADMIN — SODIUM CHLORIDE 500 ML: 9 INJECTION, SOLUTION INTRAVENOUS at 02:36

## 2023-03-24 RX ADMIN — MIRTAZAPINE 7.5 MG: 15 TABLET, FILM COATED ORAL at 23:41

## 2023-03-24 ASSESSMENT — PAIN SCALES - GENERAL: PAINLEVEL_OUTOF10: 6

## 2023-03-24 NOTE — PROGRESS NOTES
Physical Therapy          Physical Therapy Cancel Note      DATE: 3/24/2023    NAME: Carlotta Dimas  MRN: [id number]   : 1960      Patient not seen this date for Physical Therapy due to:    Surgery/Procedure: Per RN patient out of room for CT scan of abdomen.       Electronically signed by Marybel Rich PTA on 3/24/2023 at 2:36 PM

## 2023-03-24 NOTE — PLAN OF CARE
Problem: Safety - Adult  Goal: Free from fall injury  3/24/2023 1603 by Honey Morales RN  Outcome: Progressing     Problem: Discharge Planning  Goal: Discharge to home or other facility with appropriate resources  3/24/2023 1603 by Honey Morales RN  Outcome: Progressing     Problem: ABCDS Injury Assessment  Goal: Absence of physical injury  3/24/2023 1603 by Honey Morales RN  Outcome: Progressing

## 2023-03-24 NOTE — FLOWSHEET NOTE
03/23/23 2018   Treatment Team Notification   Reason for Communication Review case   Team Member Name Aissatou Ellsworth NP   Treatment Team Role Advanced Practice Nurse   Method of Communication Secure Message   Response Waiting for response   Notification Time 2019   Pt's BP 85/45. Perfect serve sent to ADA Amezquita NP. See new orders.

## 2023-03-24 NOTE — PROGRESS NOTES
[facility] Neurology   IN-PATIENT SERVICE      NEUROLOGY PROGRESS  NOTE                Interval History:     Patient is awake when I walked in the room this morning. Appears to be overall more oriented and less drowsy. Speech continues to be moderately dysarthric. He has been hypotensive over the last 24 hours. History of Present Illness: The patient is a 60 y.o. male who presents with Dizziness, Nausea, and Extremity Weakness  . The patient was seen and examined and the chart was reviewed. Patient presents to the hospital on 3/16 with hematemesis, melena, new onset A-fib. Found to have esophageal varices that were bleeding which were subsequently clipped by GI. He started to develop alcohol withdrawal symptoms 72 hours into his stay and went into delirium tremens. He has been managed with Ativan, Librium and also Risperdal.  CT head was done showing no acute intracranial abnormalities. Reportedly has been improving slightly each day. This morning was not as coherent as he had been yesterday early on but it appears as the day has gone on patient is improving and answering questions appropriately. He is lying in bed slightly drowsy. He is able to answer all my questions correctly although speech is very soft-spoken and slightly difficult to understand. He is able to follow all commands and move all 4 extremities symmetrically. Librium has since been discontinued. Risperdal is being given 0.5 mg twice daily. IV thiamine 500 mg 3 times daily was given for 3 days. \\    Physical Exam:   BP (!) 115/97   Pulse 75   Temp 97.8 °F (36.6 °C) (Oral)   Resp 17   Ht 5' 10\" (1.778 m)   Wt 181 lb 14.1 oz (82.5 kg)   SpO2 95%   BMI 26.10 kg/m²   Temp (24hrs), Av.8 °F (36.6 °C), Min:97.5 °F (36.4 °C), Max:98.1 °F (36.7 °C)      Neurological examination:    Mental status    More awake; Alert and oriented x 3; following all commands; speech is hypophonic and dysarthric.   Difficult to understand but answering questions appropriately. Cranial nerves    II - visual fields intact to confrontation; pupils reactive  III, IV, VI - extraocular muscles intact; no DESTIN; no nystagmus; no ptosis   V - normal facial sensation                                                               VII - normal facial symmetry                                                             VIII - intact hearing                                                                             IX, X - symmetrical palate elevation                                               XI - symmetrical shoulder shrug                                                       XII - midline tongue without atrophy or fasciculation      Motor function  Moving all 4 extremities symmetrically. Sensory function Intact to touch throughout      Cerebellar Intact finger-nose-finger testing. Reflex function 2/4 symmetric throughout .     Gait                  Not assessed due to drowsiness           Diagnostics:      Laboratory Testing:  CBC:   Recent Labs     03/22/23  0927 03/23/23  1026 03/24/23  0539   WBC 3.9 12.3* 13.4*   HGB 8.1* 8.7* 7.8*   PLT 90* 98* 102*     BMP:    Recent Labs     03/22/23  0927 03/23/23  1026 03/24/23  0539    136 135   K 2.8* 4.2 3.6*   CL 97* 102 102   CO2 28 22 25   BUN 7* 14 19   CREATININE 0.79 0.78 0.94   GLUCOSE 154* 144* 130*         Lab Results   Component Value Date    CHOL 227 (H) 02/02/2019    LDLCHOLESTEROL 46 08/19/2021    HDL 99 08/19/2021    TRIG 102 02/02/2019    ALT 8 03/22/2023    AST 30 03/22/2023    TSH 1.45 03/16/2023    INR 1.5 03/22/2023    LABA1C 4.5 01/01/2023    [id number] 03/16/2023         Impression:      Acute toxic metabolic encephalopathy in the setting of delirium tremens, sedating medication effect  Esophageal variceal bleed status post clipping  Alcoholic liver cirrhosis    Plan:     Maintain off of Ativan, Librium  Recommend decreasing Risperdal to 0.25 mg twice

## 2023-03-24 NOTE — PLAN OF CARE
Problem: Safety - Adult  Goal: Free from fall injury  3/24/2023 0359 by Ramesh Ledbetter RN  Outcome: Progressing     Problem: Discharge Planning  Goal: Discharge to home or other facility with appropriate resources  3/24/2023 0359 by Ramesh Ledbetter RN  Outcome: Progressing     Problem: Pain  Goal: Verbalizes/displays adequate comfort level or baseline comfort level  3/24/2023 0359 by Ramesh Ledbetter RN  Outcome: Progressing     Problem: Skin/Tissue Integrity  Goal: Absence of new skin breakdown  Description: 1. Monitor for areas of redness and/or skin breakdown  2. Assess vascular access sites hourly  3. Every 4-6 hours minimum:  Change oxygen saturation probe site  4. Every 4-6 hours:  If on nasal continuous positive airway pressure, respiratory therapy assess nares and determine need for appliance change or resting period. 3/24/2023 0359 by Ramesh Ledbetter RN  Outcome: Progressing     Problem: Safety - Medical Restraint  Goal: Remains free of injury from restraints (Restraint for Interference with Medical Device)  Description: INTERVENTIONS:  1. Determine that other, less restrictive measures have been tried or would not be effective before applying the restraint  2. Evaluate the patient's condition at the time of restraint application  3. Inform patient/family regarding the reason for restraint  4.  Q2H: Monitor safety, psychosocial status, comfort, nutrition and hydration  3/24/2023 0359 by Ramesh Ledbetter RN  Outcome: Progressing     Problem: Nutrition Deficit:  Goal: Optimize nutritional status  3/24/2023 0359 by Ramesh Ledbetter RN  Outcome: Progressing

## 2023-03-24 NOTE — PROGRESS NOTES
Week: 0 days    Minutes of Exercise per Session: 0 min   Stress: Not on file   Social Connections: Not on file   Intimate Partner Violence: Not on file   Housing Stability: Not on file       Vitals:  /63   Pulse (!) 102   Temp 98.6 °F (37 °C) (Oral)   Resp 18   Ht 5' 10\" (1.778 m)   Wt 181 lb 14.1 oz (82.5 kg)   SpO2 90%   BMI 26.10 kg/m²   Temp (24hrs), Av.1 °F (36.7 °C), Min:97.8 °F (36.6 °C), Max:98.6 °F (37 °C)    No results for input(s): POCGLU in the last 72 hours. I/O (24Hr):     Intake/Output Summary (Last 24 hours) at 3/24/2023 1316  Last data filed at 3/24/2023 0842  Gross per 24 hour   Intake 240 ml   Output --   Net 240 ml       Labs:      CBC:   Lab Results   Component Value Date/Time    WBC 13.4 2023 05:39 AM    RBC 3.56 2023 05:39 AM    RBC 4.75 2012 11:30 AM    HGB 7.8 2023 05:39 AM    HCT 25.8 2023 05:39 AM    MCV 72.5 2023 05:39 AM    MCH 21.8 2023 05:39 AM    MCHC 30.1 2023 05:39 AM    RDW 21.8 2023 05:39 AM     2023 05:39 AM     2012 11:30 AM    MPV 8.5 2023 05:39 AM     CBC with Differential:    Lab Results   Component Value Date/Time    WBC 13.4 2023 05:39 AM    RBC 3.56 2023 05:39 AM    RBC 4.75 2012 11:30 AM    HGB 7.8 2023 05:39 AM    HCT 25.8 2023 05:39 AM     2023 05:39 AM     2012 11:30 AM    MCV 72.5 2023 05:39 AM    MCH 21.8 2023 05:39 AM    MCHC 30.1 2023 05:39 AM    RDW 21.8 2023 05:39 AM    NRBC 1 2022 06:20 AM    METASPCT 2 10/30/2022 05:55 AM    LYMPHOPCT 9 2023 09:27 AM    MONOPCT 19 2023 09:27 AM    MYELOPCT 1 2021 08:25 AM    BASOPCT 0 2023 09:27 AM    MONOSABS 0.74 2023 09:27 AM    LYMPHSABS 0.35 2023 09:27 AM    EOSABS 0.08 2023 09:27 AM    BASOSABS 0.00 2023 09:27 AM    DIFFTYPE NOT REPORTED 2022 09:35 AM     Hemoglobin/Hematocrit: Primary Problem  Anemia     Active Hospital Problems    Diagnosis Date Noted    Alcohol withdrawal syndrome, with delirium ([address] [F10.931] 03/21/2023     Priority: Medium    Lightheadedness [R42] 03/17/2023     Priority: Medium    Acute metabolic encephalopathy [I68.69] 12/29/2022     Priority: Medium    Anemia [D64.9] 04/13/2022     Past Medical History:   Diagnosis Date    Abnormal EKG     Acute deep vein thrombosis (DVT) of brachial vein of right upper extremity ([address] 01/07/2023    Also- Superficial venous thrombosis of the cephalic vein in the forearm    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 Erin Frank, orthopedic, every 3-4 months, gets steroid injection    Lezama esophagus     Bleeding gastric varices 12/29/2022    BPH (benign prostatic hypertrophy)     Cholelithiasis     Cirrhosis ([address]     COVID-19 12/2020    pt reports he had a positive test while at [facility] in 2020, was asymptomatic    COVID-19 vaccine series completed 5/20/2021, 6/22/2021    Moderna 5/20/2021, 6/22/2021    DDD (degenerative disc disease), lumbar     Depression     Esophageal cancer ([address]     INVASIVE ADENOCARCINOMA ARISING IN TUBULAR ADENOMA WITH HIGH GRADE DYSPLASIA, ASSOCIATED WITH FOCAL INTESTINAL METAPLASIA     Esophageal varices ([address]     Fatty liver     GERD (gastroesophageal reflux disease)     GI bleed     Heart murmur     Hep C w/o coma, chronic ([address]     Hiatal hernia     History of alcohol abuse     6-12 beers a day; quit drinking 2019    History of blood transfusion     History of colon polyps 2016    History of tobacco abuse     [place] (hard of hearing)     Hyperlipidemia     Hypertension     Hyponatremia 07/20/2016    Hypotension 12/20/2021    Mastoid disorder, bilateral 12/31/2022    biLateral mastoid effusion    Pericardial effusion     PONV (postoperative nausea and vomiting)     Poor venous access

## 2023-03-24 NOTE — PROGRESS NOTES
kg). His oral temperature is 98.6 °F (37 °C). His blood pressure is 126/63 and his pulse is 102 (abnormal). His respiration is 18 and oxygen saturation is 90%. Labs:   No results displayed because visit has over 200 results. Reviewed patient's current plan of care and vital signs.     Labs reviewed: [x] Yes    Medications  Current Facility-Administered Medications: iopamidol (ISOVUE-370) 76 % injection 75 mL, 75 mL, IntraVENous, ONCE PRN  sodium chloride flush 0.9 % injection 10 mL, 10 mL, IntraVENous, PRN  barium sulfate (READI-CAT 2) 2 % suspension 450 mL, 450 mL, Oral, ONCE PRN  0.9 % sodium chloride bolus, 100 mL, IntraVENous, Once  thiamine mononitrate tablet 100 mg, 100 mg, Oral, Daily  midodrine (PROAMATINE) tablet 5 mg, 5 mg, Oral, TID PRN  potassium chloride (KLOR-CON M) extended release tablet 40 mEq, 40 mEq, Oral, PRN **OR** potassium bicarb-citric acid (EFFER-K) effervescent tablet 40 mEq, 40 mEq, Oral, PRN **OR** potassium chloride 10 mEq/100 mL IVPB (Peripheral Line), 10 mEq, IntraVENous, PRN  risperiDONE (RISPERDAL) tablet 0.5 mg, 0.5 mg, Oral, BID  pantoprazole (PROTONIX) 40 mg in sodium chloride (PF) 0.9 % 10 mL injection, 40 mg, IntraVENous, Q12H  LORazepam (ATIVAN) injection 1 mg, 1 mg, IntraVENous, Q4H PRN  sodium chloride flush 0.9 % injection 5-40 mL, 5-40 mL, IntraVENous, 2 times per day  sodium chloride flush 0.9 % injection 5-40 mL, 5-40 mL, IntraVENous, PRN  0.9 % sodium chloride infusion, , IntraVENous, PRN  promethazine (PHENERGAN) injection 25 mg, 25 mg, IntraMUSCular, Q6H PRN  metoprolol tartrate (LOPRESSOR) tablet 25 mg, 25 mg, Oral, BID  scopolamine (TRANSDERM-SCOP) transdermal patch 1 patch, 1 patch, TransDERmal, Q72H  melatonin tablet 3 mg, 3 mg, Oral, Nightly PRN  polyethylene glycol (GLYCOLAX) packet 17 g, 17 g, Oral, Daily PRN  acetaminophen (TYLENOL) tablet 650 mg, 650 mg, Oral, Q6H PRN **OR** acetaminophen (TYLENOL) suppository 650 mg, 650 mg, Rectal, Q6H Severe comorbid illness    Gait instability    Abnormal findings on diagnostic imaging of spine    Cervical spinal stenosis    Spinal stenosis of lumbar region with neurogenic claudication    Low hemoglobin    Symptomatic anemia, microcytic, acute    Hypotension    Former smoker, 50+ pack years, quit 2016    HLD (hyperlipidemia)    Esophageal adenocarcinoma ([address]    Anemia    Acute kidney injury ([address]    Ascites due to alcoholic cirrhosis (HCC)    Shortness of breath    Drop in hemoglobin    Esophageal polyp    Acute kidney failure, unspecified (HCC)    Muscle weakness (generalized)    Other abnormalities of gait and mobility    GI bleed    Goals of care, counseling/discussion    ACP (advance care planning)    Palliative care encounter    S/P TIPS (transjugular intrahepatic portosystemic shunt)    Acute metabolic encephalopathy    Lezama's esophagus with dysplasia    Mastoid disorder, bilateral    Acute deep vein thrombosis (DVT) of brachial vein of right upper extremity (HCC)    Chronic hepatitis C without hepatic coma (HCC)    Swelling of joint of upper arm, right    Anasarca    Lightheadedness    Alcohol withdrawal syndrome, with delirium ([address]        PLAN    Patient can be discharged home  Does NOT require inpatient psychiatric hospitalization  Will continue to follow while on medical   Continue Risperdal 0.5 mg BID  Start Remeron 7.5 mg nightly  Continue 1:1 sitter  No Medication Changes Today  Additional recommendations will follow the clinical course. Thank you very much for allowing us to participate in the care of this patient. Electronically signed by MASSIMO Belcher CNP on 3/24/2023 at 12:32 PM     **This report has been created using voice recognition software. It may contain minor errors which are inherent in voice recognition technology. **

## 2023-03-24 NOTE — PROGRESS NOTES
Consult noted. CT scan report reviewed. Blood work noted. I will evaluate the patient tomorrow. Will discuss with GI. Formal consult to follow.

## 2023-03-24 NOTE — DISCHARGE INSTR - COC
Elevated LFTs R79.89    Seasonal allergies J30.2    Lumbar facet arthropathy M47.816    Cervical facet syndrome M47.812    Thrombocytopenia (HCC) D69.6    Hepatitis C virus infection resolved after antiviral drug therapy Z86.19    Alcohol abuse F10.10    Altered mental status R41.82    Hypocalcemia E83.51    Hypophosphatemia E83.39    Malignant neoplasm of lower third of esophagus (HCC) C15.5    COVID-19 U07.1    Anxiety F41.9    Current smoker F17.200    Severe comorbid illness R69    Gait instability R26.81    Abnormal findings on diagnostic imaging of spine R93.7    Cervical spinal stenosis M48.02    Spinal stenosis of lumbar region with neurogenic claudication M48.062    Low hemoglobin D64.9    Symptomatic anemia, microcytic, acute D64.9    Hypotension I95.9    Former smoker, 50+ pack years, quit 2016 Z87.891    HLD (hyperlipidemia) E78.5    Esophageal adenocarcinoma (HCC) C15.9    Anemia D64.9    Acute kidney injury ([address] N17.9    Ascites due to alcoholic cirrhosis ([address] J02.04    Shortness of breath R06.02    Drop in hemoglobin R71.0    Esophageal polyp K22.81    Acute kidney failure, unspecified (HCC) N17.9    Muscle weakness (generalized) M62.81    Other abnormalities of gait and mobility R26.89    GI bleed K92.2    Goals of care, counseling/discussion Z71.89    ACP (advance care planning) Z71.89    Palliative care encounter Z51.5    S/P TIPS (transjugular intrahepatic portosystemic shunt) M51.881    Acute metabolic encephalopathy N36.12    Lezama's esophagus with dysplasia K22.719    Mastoid disorder, bilateral H74.93    Acute deep vein thrombosis (DVT) of brachial vein of right upper extremity (HCC) I82.621    Chronic hepatitis C without hepatic coma (HCC) B18.2    Swelling of joint of upper arm, right M25.421    Anasarca R60.1    Lightheadedness R42    Alcohol withdrawal syndrome, with delirium ([address] F10.931       Isolation/Infection:   Isolation            No Isolation          Patient Infection Catheter:[id number]}   Colostomy/Ileostomy/Ileal Conduit: {YES / NW:73774}       Date of Last BM: ***    Intake/Output Summary (Last 24 hours) at 3/24/2023 0936  Last data filed at 3/24/2023 0842  Gross per 24 hour   Intake 240 ml   Output --   Net 240 ml     No intake/output data recorded. Safety Concerns:     [employer] Safety Concerns:[id number]}    Impairments/Disabilities:      [employer] Impairments/Disabilities:214878216}    Nutrition Therapy:  Current Nutrition Therapy:   [employer] Diet List:845182171}    Routes of Feeding: {CHP DME Other Feedings:362738480}  Liquids: {Slp liquid thickness:41492}  Daily Fluid Restriction: {CHP DME Yes amt example:095247565}  Last Modified Barium Swallow with Video (Video Swallowing Test): {Done Not Done CGTV:843353536}    Treatments at the Time of Hospital Discharge:   Respiratory Treatments: ***  Oxygen Therapy:  {Therapy; copd oxygen:05489}  Ventilator:    {MH CC Vent DFAV:656450837}    Rehab Therapies: Physical Therapy and Occupational Therapy  Weight Bearing Status/Restrictions: No weight bearing restrictions  Other Medical Equipment (for information only, NOT a DME order):  {EQUIPMENT:402998389}  Other Treatments: Skilled Nursing assessment and monitoring. Medication education and monitoring per protocol.       Patient's personal belongings (please select all that are sent with patient):  {CHP DME Belongings:108637930}    RN SIGNATURE:  {Esignature:018782959}    CASE MANAGEMENT/SOCIAL WORK SECTION    Inpatient Status Date: 3/16/23    Readmission Risk Assessment Score:  Readmission Risk              Risk of Unplanned Readmission:  56           Discharging to Facility/ Agency   Name:   Address:  Phone:  Fax:    Dialysis Facility (if applicable)   Name:  Address:  Dialysis Schedule:  Phone:  Fax:    / signature: Electronically signed by Adrienne Gerard RN on 3/24/23 at 9:37 AM EDT    PHYSICIAN SECTION    Prognosis: {Prognosis:8965103010}    Condition at

## 2023-03-24 NOTE — PROGRESS NOTES
Dr. Hanny Landaverde updated of CT results. Patient having lower abd pain which was also reported to Dr. Hanny Landaverde. Order received for lactic acid and Dr. Joanne Abdi consult. Dr. Hanny Landaverde would like night shift nurse to call and give update after labs, night nurse notified.

## 2023-03-24 NOTE — PROGRESS NOTES
BP recheck  85/54 post bolus and 5mg midodrine. Perfect serve sent to ADA Rushing NP. See new orders.

## 2023-03-25 ENCOUNTER — HOSPITAL ENCOUNTER (INPATIENT)
Age: 63
LOS: 10 days | Discharge: HOME HEALTH | DRG: 432 | End: 2023-04-04
Attending: SURGERY | Admitting: SURGERY
Payer: COMMERCIAL

## 2023-03-25 ENCOUNTER — ANESTHESIA (OUTPATIENT)
Dept: OPERATING ROOM | Age: 63
End: 2023-03-25
Payer: MEDICARE

## 2023-03-25 ENCOUNTER — ANESTHESIA EVENT (OUTPATIENT)
Dept: OPERATING ROOM | Age: 63
End: 2023-03-25
Payer: MEDICARE

## 2023-03-25 ENCOUNTER — APPOINTMENT (OUTPATIENT)
Dept: CT IMAGING | Age: 63
DRG: 432 | End: 2023-03-25
Payer: COMMERCIAL

## 2023-03-25 VITALS
DIASTOLIC BLOOD PRESSURE: 61 MMHG | OXYGEN SATURATION: 99 % | RESPIRATION RATE: 20 BRPM | HEIGHT: 70 IN | SYSTOLIC BLOOD PRESSURE: 108 MMHG | WEIGHT: 181.88 LBS | BODY MASS INDEX: 26.04 KG/M2 | TEMPERATURE: 98.2 F | HEART RATE: 85 BPM

## 2023-03-25 DIAGNOSIS — K63.1 BOWEL PERFORATION (HCC): ICD-10-CM

## 2023-03-25 PROBLEM — K62.5 HEMORRHAGE OF RECTUM AND ANUS: Status: ACTIVE | Noted: 2023-03-25

## 2023-03-25 LAB
ABSOLUTE BANDS #: 0.82 K/UL (ref 0–1)
ABSOLUTE EOS #: 0.07 K/UL (ref 0–0.4)
ABSOLUTE LYMPH #: 0.41 K/UL (ref 1–4.8)
ABSOLUTE MONO #: 1.43 K/UL (ref 0.1–1.3)
ALBUMIN SERPL-MCNC: 2.2 G/DL (ref 3.5–5.2)
ALBUMIN SERPL-MCNC: 2.5 G/DL (ref 3.5–5.2)
ALLEN TEST: ABNORMAL
ALP SERPL-CCNC: 112 U/L (ref 40–129)
ALP SERPL-CCNC: 124 U/L (ref 40–129)
ALT SERPL-CCNC: 10 U/L (ref 5–41)
ALT SERPL-CCNC: 16 U/L (ref 5–41)
AMMONIA PLAS-SCNC: 19 UMOL/L (ref 16–60)
ANION GAP SERPL CALCULATED.3IONS-SCNC: 10 MMOL/L (ref 9–17)
ANION GAP SERPL CALCULATED.3IONS-SCNC: 8 MMOL/L (ref 9–17)
AST SERPL-CCNC: 41 U/L
AST SERPL-CCNC: 72 U/L
BANDS: 12 % (ref 0–10)
BASOPHILS # BLD: 0 % (ref 0–2)
BASOPHILS ABSOLUTE: 0 K/UL (ref 0–0.2)
BILIRUB DIRECT SERPL-MCNC: 0.9 MG/DL
BILIRUB INDIRECT SERPL-MCNC: 1 MG/DL (ref 0–1)
BILIRUB SERPL-MCNC: 1.8 MG/DL (ref 0.3–1.2)
BILIRUB SERPL-MCNC: 1.9 MG/DL (ref 0.3–1.2)
BUN SERPL-MCNC: 13 MG/DL (ref 8–23)
BUN SERPL-MCNC: 16 MG/DL (ref 8–23)
CA-I BLD-SCNC: 1.11 MMOL/L (ref 1.13–1.33)
CALCIUM SERPL-MCNC: 8.2 MG/DL (ref 8.6–10.4)
CALCIUM SERPL-MCNC: 8.2 MG/DL (ref 8.6–10.4)
CARBOXYHEMOGLOBIN: 3.1 % (ref 0–5)
CHLORIDE SERPL-SCNC: 101 MMOL/L (ref 98–107)
CHLORIDE SERPL-SCNC: 102 MMOL/L (ref 98–107)
CHLORIDE, WHOLE BLOOD: 110 MMOL/L (ref 98–110)
CO2 SERPL-SCNC: 20 MMOL/L (ref 20–31)
CO2 SERPL-SCNC: 25 MMOL/L (ref 20–31)
CREAT SERPL-MCNC: 0.42 MG/DL (ref 0.7–1.2)
CREAT SERPL-MCNC: 0.7 MG/DL (ref 0.7–1.2)
EOSINOPHILS RELATIVE PERCENT: 1 % (ref 0–4)
FIBRINOGEN: 280 MG/DL (ref 203–521)
FIO2: 100
GFR SERPL CREATININE-BSD FRML MDRD: >60 ML/MIN/1.73M2
GFR SERPL CREATININE-BSD FRML MDRD: >60 ML/MIN/1.73M2
GLUCOSE SERPL-MCNC: 109 MG/DL (ref 70–99)
GLUCOSE SERPL-MCNC: 150 MG/DL (ref 70–99)
HCO3 ARTERIAL: 18.3 MMOL/L (ref 22–27)
HCT VFR BLD AUTO: 21.3 % (ref 40.7–50.3)
HCT VFR BLD AUTO: 24.3 % (ref 41–53)
HCT VFR BLD AUTO: 27 % (ref 41–53)
HGB BLD-MCNC: 5.9 G/DL (ref 13–17)
HGB BLD-MCNC: 7.4 G/DL (ref 13.5–17.5)
HGB BLD-MCNC: 7.9 G/DL (ref 13.5–17.5)
INR PPP: 2.2
LACTATE PLASV-SCNC: 2.7 MMOL/L (ref 0.5–2.2)
LACTIC ACID, WHOLE BLOOD: 2.5 MMOL/L (ref 0.7–2.1)
LYMPHOCYTES # BLD: 6 % (ref 24–44)
MCH RBC QN AUTO: 21.4 PG (ref 26–34)
MCH RBC QN AUTO: 22.2 PG (ref 26–34)
MCHC RBC AUTO-ENTMCNC: 29.3 G/DL (ref 31–37)
MCHC RBC AUTO-ENTMCNC: 30.3 G/DL (ref 31–37)
MCV RBC AUTO: 73 FL (ref 80–100)
MCV RBC AUTO: 73.2 FL (ref 80–100)
METAMYELOCYTES ABSOLUTE COUNT: 0.07 K/UL
METAMYELOCYTES: 1 %
MONOCYTES # BLD: 21 % (ref 1–7)
MORPHOLOGY: ABNORMAL
NEGATIVE BASE EXCESS, ART: 6.8 MMOL/L (ref 0–2)
O2 SAT, ARTERIAL: 100 % (ref 94–100)
PARTIAL THROMBOPLASTIN TIME: 45.2 SEC (ref 23–36.5)
PATIENT TEMP: 37
PCO2 ARTERIAL: 37.4 MMHG (ref 32–45)
PDW BLD-RTO: 21.6 % (ref 11.5–14.9)
PDW BLD-RTO: 22.1 % (ref 11.5–14.9)
PH ARTERIAL: 7.31 (ref 7.35–7.45)
PLATELET # BLD AUTO: 123 K/UL (ref 150–450)
PLATELET # BLD AUTO: 154 K/UL (ref 150–450)
PLATELET # BLD AUTO: 173 K/UL (ref 138–453)
PMV BLD AUTO: 7.8 FL (ref 6–12)
PMV BLD AUTO: 7.8 FL (ref 6–12)
PO2 ARTERIAL: 336 MMHG (ref 75–95)
POTASSIUM SERPL-SCNC: 3.4 MMOL/L (ref 3.7–5.3)
POTASSIUM SERPL-SCNC: 3.9 MMOL/L (ref 3.7–5.3)
POTASSIUM, WHOLE BLOOD: 3.4 MMOL/L (ref 3.6–5)
PROT SERPL-MCNC: 5.2 G/DL (ref 6.4–8.3)
PROT SERPL-MCNC: 5.4 G/DL (ref 6.4–8.3)
PROTHROMBIN TIME: 24.1 SEC (ref 11.7–14.9)
RBC # BLD: 3.32 M/UL (ref 4.5–5.9)
RBC # BLD: 3.7 M/UL (ref 4.5–5.9)
SEG NEUTROPHILS: 59 % (ref 36–66)
SEGMENTED NEUTROPHILS ABSOLUTE COUNT: 4 K/UL (ref 1.3–9.1)
SODIUM SERPL-SCNC: 131 MMOL/L (ref 135–144)
SODIUM SERPL-SCNC: 135 MMOL/L (ref 135–144)
SODIUM, WHOLE BLOOD: 135 MMOL/L (ref 136–145)
WBC # BLD AUTO: 10.1 K/UL (ref 3.5–11)
WBC # BLD AUTO: 6.8 K/UL (ref 3.5–11)

## 2023-03-25 PROCEDURE — 83605 ASSAY OF LACTIC ACID: CPT

## 2023-03-25 PROCEDURE — 6360000002 HC RX W HCPCS: Performed by: SURGERY

## 2023-03-25 PROCEDURE — 6370000000 HC RX 637 (ALT 250 FOR IP): Performed by: INTERNAL MEDICINE

## 2023-03-25 PROCEDURE — 84132 ASSAY OF SERUM POTASSIUM: CPT

## 2023-03-25 PROCEDURE — 2000000000 HC ICU R&B

## 2023-03-25 PROCEDURE — 6370000000 HC RX 637 (ALT 250 FOR IP): Performed by: PSYCHIATRY & NEUROLOGY

## 2023-03-25 PROCEDURE — 4A133J1 MONITORING OF ARTERIAL PULSE, PERIPHERAL, PERCUTANEOUS APPROACH: ICD-10-PCS | Performed by: STUDENT IN AN ORGANIZED HEALTH CARE EDUCATION/TRAINING PROGRAM

## 2023-03-25 PROCEDURE — 2580000003 HC RX 258: Performed by: SURGERY

## 2023-03-25 PROCEDURE — 2500000003 HC RX 250 WO HCPCS: Performed by: NURSE ANESTHETIST, CERTIFIED REGISTERED

## 2023-03-25 PROCEDURE — 85027 COMPLETE CBC AUTOMATED: CPT

## 2023-03-25 PROCEDURE — 85025 COMPLETE CBC W/AUTO DIFF WBC: CPT

## 2023-03-25 PROCEDURE — 80076 HEPATIC FUNCTION PANEL: CPT

## 2023-03-25 PROCEDURE — 99231 SBSQ HOSP IP/OBS SF/LOW 25: CPT | Performed by: INTERNAL MEDICINE

## 2023-03-25 PROCEDURE — 84295 ASSAY OF SERUM SODIUM: CPT

## 2023-03-25 PROCEDURE — 85384 FIBRINOGEN ACTIVITY: CPT

## 2023-03-25 PROCEDURE — 99232 SBSQ HOSP IP/OBS MODERATE 35: CPT

## 2023-03-25 PROCEDURE — 2580000003 HC RX 258: Performed by: NURSE ANESTHETIST, CERTIFIED REGISTERED

## 2023-03-25 PROCEDURE — 82330 ASSAY OF CALCIUM: CPT

## 2023-03-25 PROCEDURE — 88341 IMHCHEM/IMCYTCHM EA ADD ANTB: CPT

## 2023-03-25 PROCEDURE — 86901 BLOOD TYPING SEROLOGIC RH(D): CPT

## 2023-03-25 PROCEDURE — 4A133B1 MONITORING OF ARTERIAL PRESSURE, PERIPHERAL, PERCUTANEOUS APPROACH: ICD-10-PCS | Performed by: STUDENT IN AN ORGANIZED HEALTH CARE EDUCATION/TRAINING PROGRAM

## 2023-03-25 PROCEDURE — 6360000002 HC RX W HCPCS: Performed by: INTERNAL MEDICINE

## 2023-03-25 PROCEDURE — 3700000001 HC ADD 15 MINUTES (ANESTHESIA): Performed by: SURGERY

## 2023-03-25 PROCEDURE — 86850 RBC ANTIBODY SCREEN: CPT

## 2023-03-25 PROCEDURE — 0D1B0Z4 BYPASS ILEUM TO CUTANEOUS, OPEN APPROACH: ICD-10-PCS | Performed by: SURGERY

## 2023-03-25 PROCEDURE — 2580000003 HC RX 258: Performed by: INTERNAL MEDICINE

## 2023-03-25 PROCEDURE — 85014 HEMATOCRIT: CPT

## 2023-03-25 PROCEDURE — 03HY32Z INSERTION OF MONITORING DEVICE INTO UPPER ARTERY, PERCUTANEOUS APPROACH: ICD-10-PCS | Performed by: STUDENT IN AN ORGANIZED HEALTH CARE EDUCATION/TRAINING PROGRAM

## 2023-03-25 PROCEDURE — 36415 COLL VENOUS BLD VENIPUNCTURE: CPT

## 2023-03-25 PROCEDURE — 86900 BLOOD TYPING SEROLOGIC ABO: CPT

## 2023-03-25 PROCEDURE — 82140 ASSAY OF AMMONIA: CPT

## 2023-03-25 PROCEDURE — 2720000010 HC SURG SUPPLY STERILE: Performed by: SURGERY

## 2023-03-25 PROCEDURE — 2500000003 HC RX 250 WO HCPCS: Performed by: INTERNAL MEDICINE

## 2023-03-25 PROCEDURE — 2580000003 HC RX 258: Performed by: NURSE PRACTITIONER

## 2023-03-25 PROCEDURE — 0DTF0ZZ RESECTION OF RIGHT LARGE INTESTINE, OPEN APPROACH: ICD-10-PCS | Performed by: SURGERY

## 2023-03-25 PROCEDURE — 3600000014 HC SURGERY LEVEL 4 ADDTL 15MIN: Performed by: SURGERY

## 2023-03-25 PROCEDURE — 6360000002 HC RX W HCPCS: Performed by: NURSE ANESTHETIST, CERTIFIED REGISTERED

## 2023-03-25 PROCEDURE — APPSS30 APP SPLIT SHARED TIME 16-30 MINUTES: Performed by: NURSE PRACTITIONER

## 2023-03-25 PROCEDURE — 82435 ASSAY OF BLOOD CHLORIDE: CPT

## 2023-03-25 PROCEDURE — 86920 COMPATIBILITY TEST SPIN: CPT

## 2023-03-25 PROCEDURE — 80048 BASIC METABOLIC PNL TOTAL CA: CPT

## 2023-03-25 PROCEDURE — 88342 IMHCHEM/IMCYTCHM 1ST ANTB: CPT

## 2023-03-25 PROCEDURE — 2500000003 HC RX 250 WO HCPCS: Performed by: SURGERY

## 2023-03-25 PROCEDURE — 6360000002 HC RX W HCPCS: Performed by: NURSE PRACTITIONER

## 2023-03-25 PROCEDURE — 99232 SBSQ HOSP IP/OBS MODERATE 35: CPT | Performed by: PSYCHIATRY & NEUROLOGY

## 2023-03-25 PROCEDURE — 85730 THROMBOPLASTIN TIME PARTIAL: CPT

## 2023-03-25 PROCEDURE — 6360000004 HC RX CONTRAST MEDICATION: Performed by: SURGERY

## 2023-03-25 PROCEDURE — C9113 INJ PANTOPRAZOLE SODIUM, VIA: HCPCS | Performed by: NURSE PRACTITIONER

## 2023-03-25 PROCEDURE — 85018 HEMOGLOBIN: CPT

## 2023-03-25 PROCEDURE — 80053 COMPREHEN METABOLIC PANEL: CPT

## 2023-03-25 PROCEDURE — 74177 CT ABD & PELVIS W/CONTRAST: CPT

## 2023-03-25 PROCEDURE — P9041 ALBUMIN (HUMAN),5%, 50ML: HCPCS | Performed by: NURSE ANESTHETIST, CERTIFIED REGISTERED

## 2023-03-25 PROCEDURE — 3600000004 HC SURGERY LEVEL 4 BASE: Performed by: SURGERY

## 2023-03-25 PROCEDURE — 85049 AUTOMATED PLATELET COUNT: CPT

## 2023-03-25 PROCEDURE — 82805 BLOOD GASES W/O2 SATURATION: CPT

## 2023-03-25 PROCEDURE — 2709999900 HC NON-CHARGEABLE SUPPLY: Performed by: SURGERY

## 2023-03-25 PROCEDURE — 88307 TISSUE EXAM BY PATHOLOGIST: CPT

## 2023-03-25 PROCEDURE — 85610 PROTHROMBIN TIME: CPT

## 2023-03-25 PROCEDURE — 3700000000 HC ANESTHESIA ATTENDED CARE: Performed by: SURGERY

## 2023-03-25 RX ORDER — SODIUM CHLORIDE, SODIUM LACTATE, POTASSIUM CHLORIDE, CALCIUM CHLORIDE 600; 310; 30; 20 MG/100ML; MG/100ML; MG/100ML; MG/100ML
INJECTION, SOLUTION INTRAVENOUS CONTINUOUS PRN
Status: DISCONTINUED | OUTPATIENT
Start: 2023-03-25 | End: 2023-03-26 | Stop reason: SDUPTHER

## 2023-03-25 RX ORDER — SODIUM CHLORIDE 0.9 % (FLUSH) 0.9 %
10 SYRINGE (ML) INJECTION PRN
Status: DISCONTINUED | OUTPATIENT
Start: 2023-03-25 | End: 2023-03-25 | Stop reason: HOSPADM

## 2023-03-25 RX ORDER — METRONIDAZOLE 500 MG/100ML
INJECTION, SOLUTION INTRAVENOUS PRN
Status: DISCONTINUED | OUTPATIENT
Start: 2023-03-25 | End: 2023-03-26 | Stop reason: SDUPTHER

## 2023-03-25 RX ORDER — PROPOFOL 10 MG/ML
INJECTION, EMULSION INTRAVENOUS PRN
Status: DISCONTINUED | OUTPATIENT
Start: 2023-03-25 | End: 2023-03-26 | Stop reason: SDUPTHER

## 2023-03-25 RX ORDER — FENTANYL CITRATE 50 UG/ML
INJECTION, SOLUTION INTRAMUSCULAR; INTRAVENOUS PRN
Status: DISCONTINUED | OUTPATIENT
Start: 2023-03-25 | End: 2023-03-26 | Stop reason: SDUPTHER

## 2023-03-25 RX ORDER — CEFEPIME HYDROCHLORIDE 2 G/1
INJECTION, POWDER, FOR SOLUTION INTRAVENOUS PRN
Status: DISCONTINUED | OUTPATIENT
Start: 2023-03-25 | End: 2023-03-26 | Stop reason: SDUPTHER

## 2023-03-25 RX ORDER — SUCCINYLCHOLINE/SOD CL,ISO/PF 100 MG/5ML
SYRINGE (ML) INTRAVENOUS PRN
Status: DISCONTINUED | OUTPATIENT
Start: 2023-03-25 | End: 2023-03-26 | Stop reason: SDUPTHER

## 2023-03-25 RX ORDER — SODIUM CHLORIDE 9 MG/ML
INJECTION, SOLUTION INTRAVENOUS CONTINUOUS
Status: DISCONTINUED | OUTPATIENT
Start: 2023-03-25 | End: 2023-03-25 | Stop reason: HOSPADM

## 2023-03-25 RX ORDER — 0.9 % SODIUM CHLORIDE 0.9 %
100 INTRAVENOUS SOLUTION INTRAVENOUS ONCE
Status: COMPLETED | OUTPATIENT
Start: 2023-03-25 | End: 2023-03-25

## 2023-03-25 RX ORDER — POTASSIUM CHLORIDE 20 MEQ/1
20 TABLET, EXTENDED RELEASE ORAL 2 TIMES DAILY WITH MEALS
Status: DISCONTINUED | OUTPATIENT
Start: 2023-03-25 | End: 2023-03-25 | Stop reason: HOSPADM

## 2023-03-25 RX ORDER — METRONIDAZOLE 500 MG/100ML
500 INJECTION, SOLUTION INTRAVENOUS EVERY 8 HOURS
Status: DISCONTINUED | OUTPATIENT
Start: 2023-03-25 | End: 2023-03-25 | Stop reason: HOSPADM

## 2023-03-25 RX ORDER — DEXAMETHASONE SODIUM PHOSPHATE 10 MG/ML
INJECTION INTRAMUSCULAR; INTRAVENOUS PRN
Status: DISCONTINUED | OUTPATIENT
Start: 2023-03-25 | End: 2023-03-26 | Stop reason: SDUPTHER

## 2023-03-25 RX ORDER — ALBUMIN, HUMAN INJ 5% 5 %
SOLUTION INTRAVENOUS PRN
Status: DISCONTINUED | OUTPATIENT
Start: 2023-03-25 | End: 2023-03-26 | Stop reason: SDUPTHER

## 2023-03-25 RX ORDER — LIDOCAINE HYDROCHLORIDE 10 MG/ML
INJECTION, SOLUTION EPIDURAL; INFILTRATION; INTRACAUDAL; PERINEURAL PRN
Status: DISCONTINUED | OUTPATIENT
Start: 2023-03-25 | End: 2023-03-26 | Stop reason: SDUPTHER

## 2023-03-25 RX ORDER — ROCURONIUM BROMIDE 10 MG/ML
INJECTION, SOLUTION INTRAVENOUS PRN
Status: DISCONTINUED | OUTPATIENT
Start: 2023-03-25 | End: 2023-03-26 | Stop reason: SDUPTHER

## 2023-03-25 RX ORDER — CALCIUM CHLORIDE 100 MG/ML
INJECTION INTRAVENOUS; INTRAVENTRICULAR PRN
Status: DISCONTINUED | OUTPATIENT
Start: 2023-03-25 | End: 2023-03-26 | Stop reason: SDUPTHER

## 2023-03-25 RX ORDER — EPINEPHRINE 1 MG/ML
INJECTION, SOLUTION INTRAMUSCULAR; SUBCUTANEOUS PRN
Status: DISCONTINUED | OUTPATIENT
Start: 2023-03-25 | End: 2023-03-26 | Stop reason: SDUPTHER

## 2023-03-25 RX ADMIN — SODIUM CHLORIDE 100 ML: 9 INJECTION, SOLUTION INTRAVENOUS at 18:29

## 2023-03-25 RX ADMIN — DEXAMETHASONE SODIUM PHOSPHATE 10 MG: 10 INJECTION INTRAMUSCULAR; INTRAVENOUS at 22:56

## 2023-03-25 RX ADMIN — SODIUM CHLORIDE, POTASSIUM CHLORIDE, SODIUM LACTATE AND CALCIUM CHLORIDE: 600; 310; 30; 20 INJECTION, SOLUTION INTRAVENOUS at 22:38

## 2023-03-25 RX ADMIN — VANCOMYCIN HYDROCHLORIDE 1000 MG: 1 INJECTION, POWDER, LYOPHILIZED, FOR SOLUTION INTRAVENOUS at 21:14

## 2023-03-25 RX ADMIN — ACETAMINOPHEN 650 MG: 325 TABLET ORAL at 18:41

## 2023-03-25 RX ADMIN — LACTULOSE 20 G: 20 SOLUTION ORAL at 10:32

## 2023-03-25 RX ADMIN — CALCIUM CHLORIDE 1 G: 100 INJECTION, SOLUTION INTRAVENOUS; INTRAVENTRICULAR at 23:31

## 2023-03-25 RX ADMIN — ROCURONIUM BROMIDE 50 MG: 10 INJECTION, SOLUTION INTRAVENOUS at 23:01

## 2023-03-25 RX ADMIN — VASOPRESSIN 0.03 UNITS/MIN: 20 INJECTION INTRAVENOUS at 23:53

## 2023-03-25 RX ADMIN — LACTULOSE 20 G: 20 SOLUTION ORAL at 14:11

## 2023-03-25 RX ADMIN — PROPOFOL 140 MG: 10 INJECTION, EMULSION INTRAVENOUS at 22:44

## 2023-03-25 RX ADMIN — SODIUM CHLORIDE, PRESERVATIVE FREE 40 MG: 5 INJECTION INTRAVENOUS at 14:11

## 2023-03-25 RX ADMIN — METOPROLOL TARTRATE 25 MG: 25 TABLET, FILM COATED ORAL at 10:32

## 2023-03-25 RX ADMIN — PHENYLEPHRINE HYDROCHLORIDE 200 MCG: 10 INJECTION INTRAVENOUS at 23:30

## 2023-03-25 RX ADMIN — METRONIDAZOLE 500 MG: 500 INJECTION, SOLUTION INTRAVENOUS at 21:08

## 2023-03-25 RX ADMIN — Medication 100 MG: at 22:46

## 2023-03-25 RX ADMIN — PHENYLEPHRINE HYDROCHLORIDE 200 MCG: 10 INJECTION INTRAVENOUS at 22:50

## 2023-03-25 RX ADMIN — CEFEPIME 2000 MG: 2 INJECTION, POWDER, FOR SOLUTION INTRAVENOUS at 21:06

## 2023-03-25 RX ADMIN — PHENYLEPHRINE HYDROCHLORIDE 100 MCG/MIN: 10 INJECTION INTRAVENOUS at 22:53

## 2023-03-25 RX ADMIN — RISPERIDONE 0.5 MG: 1 TABLET ORAL at 10:32

## 2023-03-25 RX ADMIN — FENTANYL CITRATE 50 MCG: 50 INJECTION, SOLUTION INTRAMUSCULAR; INTRAVENOUS at 22:44

## 2023-03-25 RX ADMIN — EPINEPHRINE 25 MCG: 1 INJECTION INTRAMUSCULAR; INTRAVENOUS; SUBCUTANEOUS at 23:36

## 2023-03-25 RX ADMIN — MORPHINE SULFATE 1 MG: 2 INJECTION, SOLUTION INTRAMUSCULAR; INTRAVENOUS at 17:41

## 2023-03-25 RX ADMIN — PROPOFOL 50 MG: 10 INJECTION, EMULSION INTRAVENOUS at 23:25

## 2023-03-25 RX ADMIN — EPINEPHRINE 50 MCG: 1 INJECTION INTRAMUSCULAR; INTRAVENOUS; SUBCUTANEOUS at 23:33

## 2023-03-25 RX ADMIN — LIDOCAINE HYDROCHLORIDE 5 ML: 10 INJECTION, SOLUTION EPIDURAL; INFILTRATION; INTRACAUDAL; PERINEURAL at 22:44

## 2023-03-25 RX ADMIN — SODIUM CHLORIDE: 9 INJECTION, SOLUTION INTRAVENOUS at 21:06

## 2023-03-25 RX ADMIN — EPINEPHRINE 500 MCG: 1 INJECTION INTRAMUSCULAR; INTRAVENOUS; SUBCUTANEOUS at 23:23

## 2023-03-25 RX ADMIN — SPIRONOLACTONE 50 MG: 25 TABLET ORAL at 10:32

## 2023-03-25 RX ADMIN — EPINEPHRINE 25 MCG: 1 INJECTION INTRAMUSCULAR; INTRAVENOUS; SUBCUTANEOUS at 23:38

## 2023-03-25 RX ADMIN — ALBUMIN (HUMAN) 25 G: 12.5 INJECTION, SOLUTION INTRAVENOUS at 23:40

## 2023-03-25 RX ADMIN — SODIUM CHLORIDE, PRESERVATIVE FREE 10 ML: 5 INJECTION INTRAVENOUS at 10:33

## 2023-03-25 RX ADMIN — SODIUM CHLORIDE, PRESERVATIVE FREE 10 ML: 5 INJECTION INTRAVENOUS at 18:28

## 2023-03-25 RX ADMIN — POTASSIUM CHLORIDE 40 MEQ: 1500 TABLET, EXTENDED RELEASE ORAL at 10:32

## 2023-03-25 RX ADMIN — FERROUS SULFATE TAB 325 MG (65 MG ELEMENTAL FE) 325 MG: 325 (65 FE) TAB at 10:31

## 2023-03-25 RX ADMIN — FUROSEMIDE 40 MG: 40 TABLET ORAL at 17:41

## 2023-03-25 RX ADMIN — EPINEPHRINE 0.02 MCG/KG/MIN: 1 INJECTION INTRAMUSCULAR; INTRAVENOUS; SUBCUTANEOUS at 23:53

## 2023-03-25 RX ADMIN — IOPAMIDOL 75 ML: 755 INJECTION, SOLUTION INTRAVENOUS at 18:28

## 2023-03-25 RX ADMIN — PHENYLEPHRINE HYDROCHLORIDE 200 MCG: 10 INJECTION INTRAVENOUS at 23:17

## 2023-03-25 RX ADMIN — FUROSEMIDE 40 MG: 40 TABLET ORAL at 10:31

## 2023-03-25 RX ADMIN — CEFEPIME 2 G: 2 INJECTION, POWDER, FOR SOLUTION INTRAVENOUS at 22:51

## 2023-03-25 RX ADMIN — ALBUMIN (HUMAN) 25 G: 12.5 INJECTION, SOLUTION INTRAVENOUS at 22:57

## 2023-03-25 RX ADMIN — METRONIDAZOLE 500 MG: 500 INJECTION, SOLUTION INTRAVENOUS at 22:56

## 2023-03-25 RX ADMIN — THIAMINE HCL TAB 100 MG 100 MG: 100 TAB at 10:32

## 2023-03-25 ASSESSMENT — ENCOUNTER SYMPTOMS
COUGH: 0
VOMITING: 0
WHEEZING: 0
CONSTIPATION: 0
SHORTNESS OF BREATH: 0
ABDOMINAL PAIN: 0
NAUSEA: 0
ABDOMINAL DISTENTION: 0
SHORTNESS OF BREATH: 1
DIARRHEA: 0

## 2023-03-25 ASSESSMENT — PAIN SCALES - GENERAL
PAINLEVEL_OUTOF10: 10
PAINLEVEL_OUTOF10: 10

## 2023-03-25 ASSESSMENT — PAIN DESCRIPTION - DESCRIPTORS
DESCRIPTORS: BURNING
DESCRIPTORS: BURNING

## 2023-03-25 ASSESSMENT — PAIN DESCRIPTION - LOCATION
LOCATION: ABDOMEN
LOCATION: ABDOMEN

## 2023-03-25 NOTE — PROGRESS NOTES
Pt's lactic acid 2.7 and Pt continues to have lower abdominal pain. Dr. Marii Choudhury updated via perfect  serve. Message read. No new orders at this time.

## 2023-03-25 NOTE — PLAN OF CARE
Problem: Safety - Adult  Goal: Free from fall injury  3/25/2023 1646 by Edyta Dietrich RN  Outcome: Progressing     Problem: Discharge Planning  Goal: Discharge to home or other facility with appropriate resources  3/25/2023 1646 by Edyta Dietrich RN  Outcome: Progressing     Problem: ABCDS Injury Assessment  Goal: Absence of physical injury  3/25/2023 1646 by Edyta Dietrich RN  Outcome: Progressing     Problem: Pain  Goal: Verbalizes/displays adequate comfort level or baseline comfort level  3/25/2023 1646 by Edyta Dietrich RN  Outcome: Progressing     Problem: Skin/Tissue Integrity  Goal: Absence of new skin breakdown  Description: 1. Monitor for areas of redness and/or skin breakdown  2. Assess vascular access sites hourly  3. Every 4-6 hours minimum:  Change oxygen saturation probe site  4. Every 4-6 hours:  If on nasal continuous positive airway pressure, respiratory therapy assess nares and determine need for appliance change or resting period. 3/25/2023 1646 by Edyta Dietrich RN  Outcome: Progressing     Problem: Safety - Medical Restraint  Goal: Remains free of injury from restraints (Restraint for Interference with Medical Device)  Description: INTERVENTIONS:  1. Determine that other, less restrictive measures have been tried or would not be effective before applying the restraint  2. Evaluate the patient's condition at the time of restraint application  3. Inform patient/family regarding the reason for restraint  4.  Q2H: Monitor safety, psychosocial status, comfort, nutrition and hydration  3/25/2023 1646 by Edyta Dietrich RN  Outcome: Completed     Problem: Nutrition Deficit:  Goal: Optimize nutritional status  3/25/2023 1646 by Edyta Dietrich RN  Outcome: Not Progressing  Note: Fair PO intake     Problem: Nutrition Deficit:  Goal: Optimize nutritional status  3/25/2023 1646 by Edyta Dietrich RN  Outcome: Not Progressing  Note: Fair PO intake  3/25/2023 0750 by Garth Ivory Diane RN  Outcome: Progressing

## 2023-03-25 NOTE — PLAN OF CARE
Problem: Discharge Planning  Goal: Discharge to home or other facility with appropriate resources  Outcome: Progressing     Problem: ABCDS Injury Assessment  Goal: Absence of physical injury  Outcome: Progressing     Problem: Pain  Goal: Verbalizes/displays adequate comfort level or baseline comfort level  Outcome: Progressing     Problem: Skin/Tissue Integrity  Goal: Absence of new skin breakdown  Description: 1. Monitor for areas of redness and/or skin breakdown  2. Assess vascular access sites hourly  3. Every 4-6 hours minimum:  Change oxygen saturation probe site  4. Every 4-6 hours:  If on nasal continuous positive airway pressure, respiratory therapy assess nares and determine need for appliance change or resting period.   Outcome: Progressing     Problem: Nutrition Deficit:  Goal: Optimize nutritional status  Outcome: Progressing

## 2023-03-25 NOTE — PROGRESS NOTES
concentration. Medications:      Allergies:  No Known Allergies    Current Meds:   Scheduled Meds:    potassium chloride  20 mEq Oral BID WC    mirtazapine  7.5 mg Oral Nightly    thiamine mononitrate  100 mg Oral Daily    risperiDONE  0.5 mg Oral BID    pantoprazole (PROTONIX) 40 mg injection  40 mg IntraVENous Q12H    sodium chloride flush  5-40 mL IntraVENous 2 times per day    metoprolol tartrate  25 mg Oral BID    scopolamine  1 patch TransDERmal Q72H    atorvastatin  20 mg Oral Nightly    ferrous sulfate  325 mg Oral Daily with breakfast    furosemide  40 mg Oral BID    spironolactone  50 mg Oral Daily    lactulose  20 g Oral TID     Continuous Infusions:    sodium chloride       PRN Meds: sodium chloride flush, midodrine, potassium chloride **OR** potassium alternative oral replacement **OR** potassium chloride, LORazepam, sodium chloride flush, sodium chloride, promethazine, melatonin, polyethylene glycol, acetaminophen **OR** acetaminophen, magnesium sulfate, morphine, metoprolol    Data:     Past Medical History:   has a past medical history of Abnormal EKG, Acute deep vein thrombosis (DVT) of brachial vein of right upper extremity (HCC), Adenocarcinoma in a polyp ([address] Alcoholic cirrhosis of liver with ascites ([address] Anemia, Anxiety, Arthritis, Back pain, chronic, Lezama esophagus, Bleeding gastric varices, BPH (benign prostatic hypertrophy), Cholelithiasis, Cirrhosis ([address] COVID-19, COVID-19 vaccine series completed, DDD (degenerative disc disease), lumbar, Depression, Esophageal cancer ([address] Esophageal varices ([address] Fatty liver, GERD (gastroesophageal reflux disease), GI bleed, Heart murmur, Hep C w/o coma, chronic ([address] Hiatal hernia, History of alcohol abuse, History of blood transfusion, History of colon polyps, History of tobacco abuse, [place] (hard of hearing), Hyperlipidemia, Hypertension, Hyponatremia, Hypotension, Mastoid disorder, bilateral, Pericardial effusion, PONV (postoperative Lab Results   Component Value Date/Time    CULTURE NO GROWTH 6 DAYS 01/03/2023 11:20 AM         Radiology:    Recent data reviewed    Physical Examination:        General appearance:  alert, cooperative and no distress  Eyes: Anicteric sclera. Pupils are equally round and reactive to light. Extraocular movements are intact. Lungs:  clear to auscultation bilaterally, normal effort  Heart:  regular rate and rhythm, no murmur  Abdomen:  soft, nontender, nondistended, normal bowel sounds, no masses, hepatomegaly, splenomegaly  Extremities:  no edema, redness, tenderness in the calves  Skin:  no gross lesions, rashes, induration  Neuro: Appears confused, oriented to name and place, no new focal weakness    Assessment:        Primary Problem  Anemia    Active Hospital Problems    Diagnosis Date Noted    Alcohol withdrawal syndrome, with delirium ([facility] [F10.931] 03/21/2023     Priority: Medium    Lightheadedness [R42] 03/17/2023     Priority: Medium    Acute metabolic encephalopathy [A50.24] 12/29/2022     Priority: Medium    Anemia [D64.9] 04/13/2022           Plan:        77-year-old male history of alcoholic liver cirrhosis, variceal bleeds TIPS presented with hematemesis and melena, new onset A-fib. Treated with PPI drip, 4 days of IV octreotide, CIWA protocol switched to Librium  GI was consulted. IR paracentesis was attempted but did not yield enough fluid-transfer to [facility] was consulted for taking TIPS patency with IR however patient became delirious due to alcohol withdrawals and transfer was canceled. EGD revealed bleeding varix extending from GE junction which was clipped and hemostasis accomplished  Placed on IV Rocephin for SBP prophylaxis completing 7 days of antibiotics switching to p.o. Cipro  Cardiology consulted for new onset A-fib  Hemoglobin remained stable    Has been more confused through 3/19-IV thiamine was added for concern of Wernicke's encephalopathy.   Patient placed on scheduled

## 2023-03-25 NOTE — CARE COORDINATION
DISCHARGE PLANNING NOTE:    Jagdeep Steen from [facility] called back re: referral and they can NOT accept the patient due to ETOH and substance abuse . Electronically signed by Gume Davis RN on 3/24/2023 at 1:20 PM     Spoke to patient and shailesh Demarco at the bedside. New referrals sent to [facility], [address] [place] [address] and [facility].    Electronically signed by Gume Davis RN on 3/24/2023 at 1:42 PM
ONGOING DISCHARGE  NOTE:      Writer reviewed notes, Patient is agreeable to discharge to   Facility       Discharge is pending pre cert. Pre cert has not been started. Waiting on accepting facility     Referrals sent to [address] [place] [address] and [facility]     Will continue to follow for additional discharge needs.      Electronically signed by Lina Dixon RN on 3/25/2023 at 9:30 AM
ONGOING DISCHARGE PLAN:     Patient continues to be confused today, 1:1 in place for withdrawal symptoms. The patient is from home alone, independent, drives. DME: Walker, cane, nebulizer. VNS: Denies, but states he has used one in the past and would not be opposed to having one again if it was recommended. EGD 3/17 for bloody emesis, hemoglobin 7.2 today. Will follow. Will continue to follow for additional discharge needs. If patient is discharged prior to next notation, then this note serves as note for discharge by case management.     Electronically signed by Sahara Garcia RN on 3/19/2023 at 2:02 PM
ONGOING DISCHARGE PLAN:     Patient continues to be confused today, 1:1 in place for withdrawal symptoms. The patient is from home alone, independent, drives. DME: Walker, cane, nebulizer. VNS: Previously denied, will continue to follow. EGD 3/17 for bloody emesis, hemoglobin 7.2 today. Spoke with Neela Whitt, the patient's ex-wife, and she states that this is the second time the patient has gone through withdrawal in recent months. The patient was previously treated and was able to return home. Neela Whitt states that the patient would have to make these decisions. Will continue to follow for additional discharge needs. If patient is discharged prior to next notation, then this note serves as note for discharge by case management.     Electronically signed by Angel Adamson RN on 3/20/2023 at 2:21 PM
ONGOING DISCHARGE PLAN:    Patient confused today, 1:1 in place for withdrawal symptoms. The patient is from home alone, independent, drives. DME: Walker, cane, nebulizer. VNS: Denies, but states he has used one in the past and would not be opposed to having one again if it was recommended. EGD 3/17 for bloody emesis, hemoglobin 7.3 today. Will follow. Will continue to follow for additional discharge needs. If patient is discharged prior to next notation, then this note serves as note for discharge by case management.     Electronically signed by Neymar Gil RN on 3/18/2023 at 4:51 PM
ONGOING DISCHARGE PLAN:    Patient is alert and oriented to person, place     Spoke with patient regarding discharge plan and patient confirms that plan is still to go home but PT Recs: SNF. Pt states he has been worked up for [facility] in the past and would be agreeable to have them look at things again. [facility] Sigifredo Elizondo at bedside . Lasix po, lactulose TID, risperdal , thiamine 100 mg    Will continue to follow for additional discharge needs. If patient is discharged prior to next notation, then this note serves as note for discharge by case management.     Electronically signed by Dino Brewer RN on 3/24/2023 at 9:28 AM
ONGOING DISCHARGE PLAN:    Patient is alert and oriented to self. Spoke with patient regarding discharge plan and patient confirms that plan is still unsure. Pt is from home alone. Lists for VNS and SNF provided to the patient on 3/22. Pt remains confused but each day is improving. Neuro consult completed. Weaning down risperdal, D/C CIWA scale. PO lasix BID    Will continue to follow for additional discharge needs. If patient is discharged prior to next notation, then this note serves as note for discharge by case management.     Electronically signed by Anastasia East RN on 3/23/2023 at 12:57 PM
ONGOING DISCHARGE PLAN:    Patient is disoriented and picking at things in the air. Pt lives alone . Yesterday patient stated he would be agreeable to VNS. Ex Wife stated she would be agreeable to SNF but it is up to the patient . Will need to address with the patient once more alert. Pt has 1:1 sitter at bedside. PO cipro, PO librium TID, full liquid diet    EGD 3/17 for bloody emesis, hemoglobin 7.2 today. Will continue to follow for additional discharge needs. If patient is discharged prior to next notation, then this note serves as note for discharge by case management.     Electronically signed by Newton Guo RN on 3/21/2023 at 9:31 AM
ONGOING DISCHARGE PLAN:    Patient is more alert and oriented today than yesterday. Pt not picking at the air and was able to communicate. Spoke with patient regarding discharge plan and patient wants to go home. Informed patient that his exwife rec: SNF . Pt was agreeable to look at SNF and VNS list for dc. Lists provided to the patient     Continues on 1:1 sitter     PO cipro , PO librium 5 mg TID, PO risperdal, IV thiamine. Psych following but no BHI at this time. Will continue to follow for additional discharge needs. If patient is discharged prior to next notation, then this note serves as note for discharge by case management. Electronically signed by Aaron Soriano RN on 3/22/2023 at 12:53 PM                        Post Acute Facility/Agency List     Provided patient with the following list, the list includes the overall star ratings obtained from CMS per the Medicare Web site (www.Medicare.gov):     [] [address]  [] Acute Inpatient Rehabilitation Facilities  [x] Skilled Nursing Facilities  [x] Home Care    Provided verbal instructions on how to utilize the QR Code to obtain additional detailed star ratings from www. Medicare. gov     offered to print and provide the detailed list:    [x]Accepted   []Declined    Electronically signed by Aaron Soriano RN on 3/22/2023 at 12:56 PM
expects to discharge to: [address] [place] [address] for transportation at discharge: Family    Financial    Payor: Manuel Titus / Plan: [address] [facility] / Product Type: *No Product type* /     Does insurance require precert for SNF: Yes    Potential assistance Purchasing Medications: No  Meds-to-Beds request: Yes      [address] [id number] - [address] [place] [address], [address]  [address]  [address] [place] [id number]  Phone: [phone] Fax: [phone]    [address] [place], [place] - [address] [phone] [employer] [phone]  [address] [facility] [address]  Phone: [phone] Fax: [phone]      Notes:    Factors facilitating achievement of predicted outcomes: Family support, Friend support, Motivated, Cooperative, and Pleasant    Barriers to discharge: Nausea and vomiting with blood emesis. Additional Case Management Notes: The patient is from home alone, independent, drives. DME: Walker, cane, nebulizer. VNS: Denies, but states he has used one in the past and would not be opposed to having one again if it was recommended. EGD today for bloody emesis, hemoglobin 7.3. Will follow. The Plan for Transition of Care is related to the following treatment goals of Anemia [D26.5]    IF APPLICABLE: The Patient and/or patient representative Javan Lynn and his family were provided with a choice of provider and agrees with the discharge plan. Freedom of choice list with basic dialogue that supports the patient's individualized plan of care/goals and shares the quality data associated with the providers was provided to:     Patient Representative Name:       The Patient and/or Patient Representative Agree with the Discharge Plan?       Azar Jorge RN  Case Management Department  Ph: [phone] Fax: [phone]

## 2023-03-25 NOTE — CONSULTS
General Surgery Consult      Pt Name: Yan Griffin  MRN: [id number]  YOB: 1960  Date of evaluation: 3/25/2023  Primary Care Physician: VASU Kay   Patient evaluated at the request of  Dr. Ronita Boxer  Reason for evaluation: Abdominal pain    SUBJECTIVE:   History of Chief Complaint:    Yan Griffin is a 60 y.o. male who presents with abdominal pain nonspecific on the right side. GI input noted. I was asked to evaluate the patient regarding the same. Confused according to the nurse today. Patient with history of liver cirrhosis pulmonary hypertension. CT findings blood work all noted. Not a greatest historian. No nausea vomiting. Had a loose bowel movement. Past Medical History   has a past medical history of Abnormal EKG, Acute deep vein thrombosis (DVT) of brachial vein of right upper extremity ([address] Adenocarcinoma in a polyp ([address] Alcoholic cirrhosis of liver with ascites ([address] Anemia, Anxiety, Arthritis, Back pain, chronic, Lezama esophagus, Bleeding gastric varices, BPH (benign prostatic hypertrophy), Cholelithiasis, Cirrhosis ([address] COVID-19, COVID-19 vaccine series completed, DDD (degenerative disc disease), lumbar, Depression, Esophageal cancer ([address] Esophageal varices ([address] Fatty liver, GERD (gastroesophageal reflux disease), GI bleed, Heart murmur, Hep C w/o coma, chronic ([address] Hiatal hernia, History of alcohol abuse, History of blood transfusion, History of colon polyps, History of tobacco abuse, [place] (hard of hearing), Hyperlipidemia, Hypertension, Hyponatremia, Hypotension, Mastoid disorder, bilateral, Pericardial effusion, PONV (postoperative nausea and vomiting), Poor venous access, Port-A-Cath in place, Portal hypertension ([address] Sciatica, Secondary esophageal varices ([address] Shortness of breath, Spinal stenosis, Stomach ulcer, Thrombocytopenia ([address] Tubular adenoma of colon, Vitamin D deficiency, and Wears glasses.     Past Surgical History   has a past stomach and the right hemicolon. Mild stranding around the right colon nonspecific. Pleural effusion atelectasis pericardial effusion anasarca. 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 BMP unremarkable other than mild hypokalemia. WBC count normal.  Hemoglobin stable. Platelet count adequate. does not have any pertinent problems on file. PLAN:   Continue diet as tolerated. Medical management. No further testing necessary from my standpoint. Discussed with nurse taking care of the patient. Thank you for this interesting consult and for allowing us to participate in the care of this patient. If you have any questions please don't hesitate to call.           Electronically signed by Brandon Ordaz MD  on 3/25/2023 at 12:20 PM

## 2023-03-25 NOTE — PROGRESS NOTES
supple and non tender without mass, no thyromegaly or thyroid nodules, no cervical lymphadenopathy   Pulmonary/Chest: clear to auscultation bilaterally- no wheezes, rales or rhonchi, normal air movement, no respiratory distress  Cardiovascular: normal rate, regular rhythm, normal S1 and S2, no murmurs, rubs, clicks or gallops, distal pulses intact, no carotid bruits  Abdomen: soft, non-tender, non-distended, normal bowel sounds, no masses or organomegaly  Extremities: no cyanosis, clubbing or edema  Musculoskeletal: normal range of motion, no joint swelling, deformity or tenderness  Neurologic: no cranial nerve deficit and muscle strength normal    Lab and Imaging Review     CBC  Recent Labs     03/23/23  1026 03/24/23  0539 03/25/23  0535   WBC 12.3* 13.4* 10.1   HGB 8.7* 7.8* 7.4*   HCT 28.9* 25.8* 24.3*   MCV 74.1* 72.5* 73.2*   PLT 98* 102* 123*       BMP  Recent Labs     03/23/23  1026 03/24/23  0539 03/25/23  0535    135 135   K 4.2 3.6* 3.4*    102 102   CO2 22 25 25   BUN 14 19 16   CREATININE 0.78 0.94 0.70   GLUCOSE 144* 130* 109*   CALCIUM 8.0* 8.2* 8.2*       LFTS  Recent Labs     03/22/23  0927   ALKPHOS 122   ALT 8   AST 30   PROT 6.1*   BILITOT 2.7*   LABALBU 2.9*       AMYLASE/LIPASE/AMMONIA  Recent Labs     03/22/23  0927   AMMONIA 30       PT/INR  Recent Labs     03/22/23  0927   PROTIME 18.7*   INR 1.5      Latest Reference Range & Units 3/17/23 15:42   AFP (Alpha Fetoprotein) <8.4 ug/L 3.3     FINDINGS:ct 3/24/23   LOWER CHEST:  Small to moderate right and small left pleural effusions. Associated passive atelectasis in the bilateral lower lobes with minimal   associated air bronchograms. Normal heart size. Suggestion of mild centric   left ventricular hypertrophy. Small to moderate pericardial effusion. Embolization coil along the right lateral margin of the distal thoracic   esophagus. ORGANS:  Cirrhotic morphology of the liver with no obvious focal liver   lesion. hearing grossly normal bilaterally  Neck: neck supple and non tender without mass, no thyromegaly or thyroid nodules, no cervical lymphadenopathy   Pulmonary/Chest: clear to auscultation bilaterally- no wheezes, rales or rhonchi, normal air movement, no respiratory distress  Cardiovascular: normal rate, regular rhythm, normal S1 and S2, no murmurs, rubs, clicks or gallops, distal pulses intact, no carotid bruits  Abdomen: soft, non-tender, non-distended, normal bowel sounds, no masses or organomegaly  Extremities: no cyanosis, clubbing or edema  Musculoskeletal: normal range of motion, no joint swelling, deformity or tenderness  Neurologic: no cranial nerve deficit and muscle strength normal    Data Review:    Recent Labs     03/23/23  1026 03/24/23  0539 03/25/23 0535   WBC 12.3* 13.4* 10.1   HGB 8.7* 7.8* 7.4*   HCT 28.9* 25.8* 24.3*   MCV 74.1* 72.5* 73.2*   PLT 98* 102* 123*     Recent Labs     03/24/23  0539 03/25/23  0535 03/25/23 2036    135 131*   K 3.6* 3.4* 3.9    102 101   CO2 25 25 20   BUN 19 16 13   CREATININE 0.94 0.70 0.42*     Recent Labs     03/25/23  1323 03/25/23 2036   AST 41* 72*   ALT 10 16   BILIDIR 0.9*  --    BILITOT 1.9* 1.8*   ALKPHOS 112 124     No results for input(s): LIPASE, AMYLASE in the last 72 hours. No results for input(s): PROTIME, INR in the last 72 hours. No results for input(s): PTT in the last 72 hours. No results for input(s): OCCULTBLD in the last 72 hours.   CEA:    Lab Results   Component Value Date/Time    CEA 4.9 11/29/2022 02:18 PM     Ca 125:  No results found for:   Ca 19-9:    Lab Results   Component Value Date/Time     29 11/29/2022 02:18 PM     Ca 15-3:  No results found for:   AFP:  No components found for: AFAFP  Beta HCG:  No components found for: BHCG  Neuron Specific Enolase:  No results found for: NSE      Additional :    Abdominal pain  CT scan from yesterday reviewed  Surgery on board  No fever today  Normal white

## 2023-03-25 NOTE — PROGRESS NOTES
dysarthric. Difficult to understand but answering questions appropriately. Cranial nerves    II - visual fields intact to confrontation; pupils reactive  III, IV, VI - extraocular muscles intact; no DESTIN; no nystagmus; no ptosis   V - normal facial sensation                                                               VII - normal facial symmetry                                                             VIII - intact hearing                                                                             IX, X - symmetrical palate elevation                                               XI - symmetrical shoulder shrug                                                       XII - midline tongue without atrophy or fasciculation      Motor function  Moving all 4 extremities symmetrically. Sensory function Intact to touch throughout      Cerebellar Difficulty with finger-nose-finger testing   Reflex function 2/4 symmetric throughout . Gait                  Not assessed             Diagnostics:      Laboratory Testing:  CBC:   Recent Labs     03/23/23  1026 03/24/23  0539 03/25/23  0535   WBC 12.3* 13.4* 10.1   HGB 8.7* 7.8* 7.4*   PLT 98* 102* 123*     BMP:    Recent Labs     03/23/23  1026 03/24/23  0539 03/25/23  0535    135 135   K 4.2 3.6* 3.4*    102 102   CO2 22 25 25   BUN 14 19 16   CREATININE 0.78 0.94 0.70   GLUCOSE 144* 130* 109*         Lab Results   Component Value Date    CHOL 227 (H) 02/02/2019    LDLCHOLESTEROL 46 08/19/2021    HDL 99 08/19/2021    TRIG 102 02/02/2019    ALT 8 03/22/2023    AST 30 03/22/2023    TSH 1.45 03/16/2023    INR 1.5 03/22/2023    LABA1C 4.5 01/01/2023    [id number] 1010 03/16/2023         Impression:      Acute toxic metabolic encephalopathy in the setting of delirium tremens, sedating medication effect; improving  Ataxia, difficulty with coordination; may be secondary to history of alcoholism and cerebellar dysfunction.   Although cannot exclude acute infarct or Wernicke's encephalopathy. Esophageal variceal bleed status post clipping  Alcoholic liver cirrhosis    Plan:     I will add MRI brain without contrast to rule out acute stroke with the ataxia and also rule out Wernicke's encephalopathy. He will have to go to a different facility for this.   Recommend PT OT  Psychiatry recommendation  Will follow      Electronically signed by Medhat Torres DO on 3/25/2023 at 11:17 AM      Medhat Torres, [address] [place] [address]  Neurology

## 2023-03-25 NOTE — PROGRESS NOTES
Dr. Korina Peña called. If there is free air on CT abd and pelvis he needs to be made aware and the pt will need to be transferred to [facility] was done and is pending. Writer called [facility] who stated it's currently being read and the report will be in [employer] soon.

## 2023-03-25 NOTE — PROGRESS NOTES
therapy    Alcohol abuse    Altered mental status    Hypocalcemia    Hypophosphatemia    Malignant neoplasm of lower third of esophagus (HCC)    COVID-19    Anxiety    Current smoker    Severe comorbid illness    Gait instability    Abnormal findings on diagnostic imaging of spine    Cervical spinal stenosis    Spinal stenosis of lumbar region with neurogenic claudication    Low hemoglobin    Symptomatic anemia, microcytic, acute    Hypotension    Former smoker, 50+ pack years, quit 2016    HLD (hyperlipidemia)    Esophageal adenocarcinoma ([address]    Anemia    Acute kidney injury ([address]    Ascites due to alcoholic cirrhosis ([address]    Shortness of breath    Drop in hemoglobin    Esophageal polyp    Acute kidney failure, unspecified (HCC)    Muscle weakness (generalized)    Other abnormalities of gait and mobility    GI bleed    Goals of care, counseling/discussion    ACP (advance care planning)    Palliative care encounter    S/P TIPS (transjugular intrahepatic portosystemic shunt)    Acute metabolic encephalopathy    Lezama's esophagus with dysplasia    Mastoid disorder, bilateral    Acute deep vein thrombosis (DVT) of brachial vein of right upper extremity (HCC)    Chronic hepatitis C without hepatic coma (HCC)    Swelling of joint of upper arm, right    Anasarca    Lightheadedness    Alcohol withdrawal syndrome, with delirium ([address]        PLAN    Patient can be discharged home  Does NOT require inpatient psychiatric hospitalization  Will continue to follow while on medical   Continue Risperdal 0.5 mg BID  Continue Remeron 7.5 mg nightly  No Medication Changes Today  Additional recommendations will follow the clinical course. Thank you very much for allowing us to participate in the care of this patient. Electronically signed by MASSIMO Mendez CNP on 3/25/2023 at 12:13 PM     **This report has been created using voice recognition software.  It may contain minor errors which are inherent in voice recognition technology. **

## 2023-03-26 ENCOUNTER — APPOINTMENT (OUTPATIENT)
Dept: GENERAL RADIOLOGY | Age: 63
DRG: 432 | End: 2023-03-26
Attending: SURGERY
Payer: COMMERCIAL

## 2023-03-26 LAB
ABSOLUTE EOS #: 0 K/UL (ref 0–0.4)
ABSOLUTE EOS #: 0 K/UL (ref 0–0.4)
ABSOLUTE EOS #: 0.14 K/UL (ref 0–0.4)
ABSOLUTE IMMATURE GRANULOCYTE: 0 K/UL (ref 0–0.3)
ABSOLUTE IMMATURE GRANULOCYTE: 0 K/UL (ref 0–0.3)
ABSOLUTE IMMATURE GRANULOCYTE: 0.28 K/UL (ref 0–0.3)
ABSOLUTE LYMPH #: 0.09 K/UL (ref 1–4.8)
ABSOLUTE LYMPH #: 0.36 K/UL (ref 1–4.8)
ABSOLUTE LYMPH #: 1.27 K/UL (ref 1–4.8)
ABSOLUTE MONO #: 0.35 K/UL (ref 0.1–0.8)
ABSOLUTE MONO #: 0.64 K/UL (ref 0.1–0.8)
ABSOLUTE MONO #: 1.13 K/UL (ref 0.1–0.8)
ALBUMIN SERPL-MCNC: 2.6 G/DL (ref 3.5–5.2)
ALBUMIN SERPL-MCNC: 2.6 G/DL (ref 3.5–5.2)
ALBUMIN SERPL-MCNC: 2.7 G/DL (ref 3.5–5.2)
ALBUMIN SERPL-MCNC: NORMAL G/DL (ref 3.5–5.2)
ALBUMIN/GLOBULIN RATIO: 1.2 (ref 1–2.5)
ALBUMIN/GLOBULIN RATIO: 1.4 (ref 1–2.5)
ALBUMIN/GLOBULIN RATIO: 1.4 (ref 1–2.5)
ALBUMIN/GLOBULIN RATIO: NORMAL (ref 1–2.5)
ALLEN TEST: ABNORMAL
ALP SERPL-CCNC: 75 U/L (ref 40–129)
ALP SERPL-CCNC: 76 U/L (ref 40–129)
ALP SERPL-CCNC: 80 U/L (ref 40–129)
ALP SERPL-CCNC: NORMAL U/L (ref 40–129)
ALT SERPL-CCNC: 10 U/L (ref 5–41)
ALT SERPL-CCNC: NORMAL U/L (ref 5–41)
AMMONIA PLAS-SCNC: 34 UMOL/L (ref 16–60)
ANION GAP SERPL CALCULATED.3IONS-SCNC: 10 MMOL/L (ref 9–17)
ANION GAP SERPL CALCULATED.3IONS-SCNC: 10 MMOL/L (ref 9–17)
ANION GAP SERPL CALCULATED.3IONS-SCNC: 9 MMOL/L (ref 9–17)
ANION GAP SERPL CALCULATED.3IONS-SCNC: NORMAL MMOL/L (ref 9–17)
AST SERPL-CCNC: 24 U/L
AST SERPL-CCNC: 29 U/L
AST SERPL-CCNC: 34 U/L
AST SERPL-CCNC: NORMAL U/L
BASOPHILS # BLD: 0 % (ref 0–2)
BASOPHILS ABSOLUTE: 0 K/UL (ref 0–0.2)
BILIRUB DIRECT SERPL-MCNC: 1 MG/DL
BILIRUB DIRECT SERPL-MCNC: 1 MG/DL
BILIRUB DIRECT SERPL-MCNC: 1.3 MG/DL
BILIRUB DIRECT SERPL-MCNC: NORMAL MG/DL
BILIRUB INDIRECT SERPL-MCNC: 0.7 MG/DL (ref 0–1)
BILIRUB INDIRECT SERPL-MCNC: 1.9 MG/DL (ref 0–1)
BILIRUB INDIRECT SERPL-MCNC: 1.9 MG/DL (ref 0–1)
BILIRUB SERPL-MCNC: 1.7 MG/DL (ref 0.3–1.2)
BILIRUB SERPL-MCNC: 2.9 MG/DL (ref 0.3–1.2)
BILIRUB SERPL-MCNC: 3.2 MG/DL (ref 0.3–1.2)
BILIRUB SERPL-MCNC: NORMAL MG/DL (ref 0.3–1.2)
BNP SERPL-MCNC: 379 PG/ML
BUN SERPL-MCNC: 11 MG/DL (ref 8–23)
BUN SERPL-MCNC: 11 MG/DL (ref 8–23)
BUN SERPL-MCNC: 12 MG/DL (ref 8–23)
BUN SERPL-MCNC: NORMAL MG/DL (ref 8–23)
CA-I BLD-SCNC: 1.28 MMOL/L (ref 1.13–1.33)
CALCIUM SERPL-MCNC: 8 MG/DL (ref 8.6–10.4)
CALCIUM SERPL-MCNC: 8.3 MG/DL (ref 8.6–10.4)
CALCIUM SERPL-MCNC: 8.3 MG/DL (ref 8.6–10.4)
CALCIUM SERPL-MCNC: NORMAL MG/DL (ref 8.6–10.4)
CHLORIDE SERPL-SCNC: 102 MMOL/L (ref 98–107)
CHLORIDE SERPL-SCNC: 103 MMOL/L (ref 98–107)
CHLORIDE SERPL-SCNC: 106 MMOL/L (ref 98–107)
CHLORIDE SERPL-SCNC: NORMAL MMOL/L (ref 98–107)
CO2 SERPL-SCNC: 18 MMOL/L (ref 20–31)
CO2 SERPL-SCNC: 19 MMOL/L (ref 20–31)
CO2 SERPL-SCNC: 20 MMOL/L (ref 20–31)
CO2 SERPL-SCNC: NORMAL MMOL/L (ref 20–31)
CREAT SERPL-MCNC: 0.48 MG/DL (ref 0.7–1.2)
CREAT SERPL-MCNC: 0.49 MG/DL (ref 0.7–1.2)
CREAT SERPL-MCNC: 0.57 MG/DL (ref 0.7–1.2)
CREAT SERPL-MCNC: NORMAL MG/DL (ref 0.7–1.2)
EOSINOPHILS RELATIVE PERCENT: 0 % (ref 1–4)
EOSINOPHILS RELATIVE PERCENT: 0 % (ref 1–4)
EOSINOPHILS RELATIVE PERCENT: 1 % (ref 1–4)
FIO2: 40
GFR SERPL CREATININE-BSD FRML MDRD: >60 ML/MIN/1.73M2
GFR SERPL CREATININE-BSD FRML MDRD: NORMAL ML/MIN/1.73M2
GLUCOSE BLD-MCNC: 176 MG/DL (ref 75–110)
GLUCOSE BLD-MCNC: 190 MG/DL (ref 75–110)
GLUCOSE BLD-MCNC: 201 MG/DL (ref 74–100)
GLUCOSE SERPL-MCNC: 145 MG/DL (ref 70–99)
GLUCOSE SERPL-MCNC: 148 MG/DL (ref 70–99)
GLUCOSE SERPL-MCNC: 210 MG/DL (ref 70–99)
GLUCOSE SERPL-MCNC: NORMAL MG/DL (ref 70–99)
HCT VFR BLD AUTO: 18.7 % (ref 40.7–50.3)
HCT VFR BLD AUTO: 21.3 % (ref 40.7–50.3)
HCT VFR BLD AUTO: 23.5 % (ref 40.7–50.3)
HCT VFR BLD AUTO: 24 % (ref 40.7–50.3)
HCT VFR BLD AUTO: 25.5 % (ref 40.7–50.3)
HGB BLD-MCNC: 5.1 G/DL (ref 13–17)
HGB BLD-MCNC: 6.5 G/DL (ref 13–17)
HGB BLD-MCNC: 7.2 G/DL (ref 13–17)
HGB BLD-MCNC: 7.2 G/DL (ref 13–17)
HGB BLD-MCNC: 7.8 G/DL (ref 13–17)
IMMATURE GRANULOCYTES: 0 %
IMMATURE GRANULOCYTES: 0 %
IMMATURE GRANULOCYTES: 2 %
INR PPP: 1.5
INR PPP: 1.8
INR PPP: 2.3
LACTIC ACID, WHOLE BLOOD: 1.5 MMOL/L (ref 0.7–2.1)
LYMPHOCYTES # BLD: 1 % (ref 24–44)
LYMPHOCYTES # BLD: 5 % (ref 24–44)
LYMPHOCYTES # BLD: 9 % (ref 24–44)
MAGNESIUM SERPL-MCNC: 1.2 MG/DL (ref 1.6–2.6)
MAGNESIUM SERPL-MCNC: 2 MG/DL (ref 1.6–2.6)
MAGNESIUM SERPL-MCNC: 2 MG/DL (ref 1.6–2.6)
MAGNESIUM SERPL-MCNC: NORMAL MG/DL (ref 1.6–2.6)
MCH RBC QN AUTO: 22.1 PG (ref 25.2–33.5)
MCH RBC QN AUTO: 24 PG (ref 25.2–33.5)
MCH RBC QN AUTO: 24.7 PG (ref 25.2–33.5)
MCHC RBC AUTO-ENTMCNC: 27.3 G/DL (ref 28.4–34.8)
MCHC RBC AUTO-ENTMCNC: 30.5 G/DL (ref 28.4–34.8)
MCHC RBC AUTO-ENTMCNC: 30.6 G/DL (ref 28.4–34.8)
MCV RBC AUTO: 78.6 FL (ref 82.6–102.9)
MCV RBC AUTO: 80.5 FL (ref 82.6–102.9)
MCV RBC AUTO: 81 FL (ref 82.6–102.9)
MODE: ABNORMAL
MONOCYTES # BLD: 4 % (ref 1–7)
MONOCYTES # BLD: 8 % (ref 1–7)
MONOCYTES # BLD: 9 % (ref 1–7)
MORPHOLOGY: ABNORMAL
NEGATIVE BASE EXCESS, ART: 6 (ref 0–2)
NRBC AUTOMATED: 0 PER 100 WBC
O2 DEVICE/FLOW/%: ABNORMAL
PARTIAL THROMBOPLASTIN TIME: 40.9 SEC (ref 23–36.5)
PDW BLD-RTO: 18.9 % (ref 11.8–14.4)
PDW BLD-RTO: 18.9 % (ref 11.8–14.4)
PDW BLD-RTO: 20.7 % (ref 11.8–14.4)
PHOSPHATE SERPL-MCNC: 3.3 MG/DL (ref 2.5–4.5)
PHOSPHATE SERPL-MCNC: 3.9 MG/DL (ref 2.5–4.5)
PHOSPHATE SERPL-MCNC: 4 MG/DL (ref 2.5–4.5)
PHOSPHATE SERPL-MCNC: NORMAL MG/DL (ref 2.5–4.5)
PLATELET # BLD AUTO: 112 K/UL (ref 138–453)
PLATELET # BLD AUTO: 164 K/UL (ref 138–453)
PLATELET # BLD AUTO: ABNORMAL K/UL (ref 138–453)
PLATELET, FLUORESCENCE: 141 K/UL (ref 138–453)
PLATELET, IMMATURE FRACTION: 4.9 % (ref 1.1–10.3)
PMV BLD AUTO: 10.4 FL (ref 8.1–13.5)
PMV BLD AUTO: 10.7 FL (ref 8.1–13.5)
POC HCO3: 19.8 MMOL/L (ref 21–28)
POC LACTIC ACID: 1.45 MMOL/L (ref 0.56–1.39)
POC O2 SATURATION: 99 % (ref 94–98)
POC PCO2: 37.1 MM HG (ref 35–48)
POC PH: 7.33 (ref 7.35–7.45)
POC PO2: 148.7 MM HG (ref 83–108)
POTASSIUM SERPL-SCNC: 3.4 MMOL/L (ref 3.7–5.3)
POTASSIUM SERPL-SCNC: 4.3 MMOL/L (ref 3.7–5.3)
POTASSIUM SERPL-SCNC: 4.7 MMOL/L (ref 3.7–5.3)
POTASSIUM SERPL-SCNC: NORMAL MMOL/L (ref 3.7–5.3)
PROT SERPL-MCNC: 4.5 G/DL (ref 6.4–8.3)
PROT SERPL-MCNC: 4.7 G/DL (ref 6.4–8.3)
PROT SERPL-MCNC: 4.8 G/DL (ref 6.4–8.3)
PROT SERPL-MCNC: NORMAL G/DL (ref 6.4–8.3)
PROTHROMBIN TIME: 18.3 SEC (ref 11.7–14.9)
PROTHROMBIN TIME: 20.7 SEC (ref 11.7–14.9)
PROTHROMBIN TIME: 25.3 SEC (ref 11.7–14.9)
RBC # BLD: 2.31 M/UL (ref 4.21–5.77)
RBC # BLD: 2.71 M/UL (ref 4.21–5.77)
RBC # BLD: 2.92 M/UL (ref 4.21–5.77)
REASON FOR REJECTION: NORMAL
SAMPLE SITE: ABNORMAL
SEG NEUTROPHILS: 80 % (ref 36–66)
SEG NEUTROPHILS: 86 % (ref 36–66)
SEG NEUTROPHILS: 95 % (ref 36–66)
SEGMENTED NEUTROPHILS ABSOLUTE COUNT: 11.28 K/UL (ref 1.8–7.7)
SEGMENTED NEUTROPHILS ABSOLUTE COUNT: 6.1 K/UL (ref 1.8–7.7)
SEGMENTED NEUTROPHILS ABSOLUTE COUNT: 8.26 K/UL (ref 1.8–7.7)
SODIUM SERPL-SCNC: 131 MMOL/L (ref 135–144)
SODIUM SERPL-SCNC: 131 MMOL/L (ref 135–144)
SODIUM SERPL-SCNC: 135 MMOL/L (ref 135–144)
SODIUM SERPL-SCNC: NORMAL MMOL/L (ref 135–144)
TROPONIN I SERPL DL<=0.01 NG/ML-MCNC: 16 NG/L (ref 0–22)
WBC # BLD AUTO: 14.1 K/UL (ref 3.5–11.3)
WBC # BLD AUTO: 7.1 K/UL (ref 3.5–11.3)
WBC # BLD AUTO: 8.7 K/UL (ref 3.5–11.3)
ZZ NTE CLEAN UP: ORDERED TEST: NORMAL
ZZ NTE WITH NAME CLEAN UP: SPECIMEN SOURCE: NORMAL

## 2023-03-26 PROCEDURE — 82947 ASSAY GLUCOSE BLOOD QUANT: CPT

## 2023-03-26 PROCEDURE — P9016 RBC LEUKOCYTES REDUCED: HCPCS

## 2023-03-26 PROCEDURE — 85610 PROTHROMBIN TIME: CPT

## 2023-03-26 PROCEDURE — 30233N1 TRANSFUSION OF NONAUTOLOGOUS RED BLOOD CELLS INTO PERIPHERAL VEIN, PERCUTANEOUS APPROACH: ICD-10-PCS | Performed by: SURGERY

## 2023-03-26 PROCEDURE — 82803 BLOOD GASES ANY COMBINATION: CPT

## 2023-03-26 PROCEDURE — 30233L1 TRANSFUSION OF NONAUTOLOGOUS FRESH PLASMA INTO PERIPHERAL VEIN, PERCUTANEOUS APPROACH: ICD-10-PCS | Performed by: SURGERY

## 2023-03-26 PROCEDURE — A4216 STERILE WATER/SALINE, 10 ML: HCPCS | Performed by: STUDENT IN AN ORGANIZED HEALTH CARE EDUCATION/TRAINING PROGRAM

## 2023-03-26 PROCEDURE — 94002 VENT MGMT INPAT INIT DAY: CPT

## 2023-03-26 PROCEDURE — 80048 BASIC METABOLIC PNL TOTAL CA: CPT

## 2023-03-26 PROCEDURE — 85055 RETICULATED PLATELET ASSAY: CPT

## 2023-03-26 PROCEDURE — 71045 X-RAY EXAM CHEST 1 VIEW: CPT

## 2023-03-26 PROCEDURE — 85025 COMPLETE CBC W/AUTO DIFF WBC: CPT

## 2023-03-26 PROCEDURE — P9017 PLASMA 1 DONOR FRZ W/IN 8 HR: HCPCS

## 2023-03-26 PROCEDURE — 84100 ASSAY OF PHOSPHORUS: CPT

## 2023-03-26 PROCEDURE — 05HN33Z INSERTION OF INFUSION DEVICE INTO LEFT INTERNAL JUGULAR VEIN, PERCUTANEOUS APPROACH: ICD-10-PCS | Performed by: SURGERY

## 2023-03-26 PROCEDURE — 83735 ASSAY OF MAGNESIUM: CPT

## 2023-03-26 PROCEDURE — 2700000000 HC OXYGEN THERAPY PER DAY

## 2023-03-26 PROCEDURE — 93005 ELECTROCARDIOGRAM TRACING: CPT | Performed by: STUDENT IN AN ORGANIZED HEALTH CARE EDUCATION/TRAINING PROGRAM

## 2023-03-26 PROCEDURE — 86900 BLOOD TYPING SEROLOGIC ABO: CPT

## 2023-03-26 PROCEDURE — 85018 HEMOGLOBIN: CPT

## 2023-03-26 PROCEDURE — 6360000002 HC RX W HCPCS: Performed by: STUDENT IN AN ORGANIZED HEALTH CARE EDUCATION/TRAINING PROGRAM

## 2023-03-26 PROCEDURE — 6360000002 HC RX W HCPCS: Performed by: NURSE ANESTHETIST, CERTIFIED REGISTERED

## 2023-03-26 PROCEDURE — 37799 UNLISTED PX VASCULAR SURGERY: CPT

## 2023-03-26 PROCEDURE — 6370000000 HC RX 637 (ALT 250 FOR IP): Performed by: STUDENT IN AN ORGANIZED HEALTH CARE EDUCATION/TRAINING PROGRAM

## 2023-03-26 PROCEDURE — 80076 HEPATIC FUNCTION PANEL: CPT

## 2023-03-26 PROCEDURE — 2500000003 HC RX 250 WO HCPCS: Performed by: NURSE ANESTHETIST, CERTIFIED REGISTERED

## 2023-03-26 PROCEDURE — 94761 N-INVAS EAR/PLS OXIMETRY MLT: CPT

## 2023-03-26 PROCEDURE — 2500000003 HC RX 250 WO HCPCS: Performed by: STUDENT IN AN ORGANIZED HEALTH CARE EDUCATION/TRAINING PROGRAM

## 2023-03-26 PROCEDURE — 83605 ASSAY OF LACTIC ACID: CPT

## 2023-03-26 PROCEDURE — C9113 INJ PANTOPRAZOLE SODIUM, VIA: HCPCS | Performed by: STUDENT IN AN ORGANIZED HEALTH CARE EDUCATION/TRAINING PROGRAM

## 2023-03-26 PROCEDURE — 84484 ASSAY OF TROPONIN QUANT: CPT

## 2023-03-26 PROCEDURE — 85014 HEMATOCRIT: CPT

## 2023-03-26 PROCEDURE — 36430 TRANSFUSION BLD/BLD COMPNT: CPT

## 2023-03-26 PROCEDURE — 82330 ASSAY OF CALCIUM: CPT

## 2023-03-26 PROCEDURE — 2580000003 HC RX 258: Performed by: STUDENT IN AN ORGANIZED HEALTH CARE EDUCATION/TRAINING PROGRAM

## 2023-03-26 PROCEDURE — 83880 ASSAY OF NATRIURETIC PEPTIDE: CPT

## 2023-03-26 PROCEDURE — 86927 PLASMA FRESH FROZEN: CPT

## 2023-03-26 PROCEDURE — 82140 ASSAY OF AMMONIA: CPT

## 2023-03-26 PROCEDURE — 2000000000 HC ICU R&B

## 2023-03-26 PROCEDURE — 85730 THROMBOPLASTIN TIME PARTIAL: CPT

## 2023-03-26 RX ORDER — POTASSIUM CHLORIDE 29.8 MG/ML
20 INJECTION INTRAVENOUS
Status: COMPLETED | OUTPATIENT
Start: 2023-03-26 | End: 2023-03-26

## 2023-03-26 RX ORDER — FUROSEMIDE 40 MG/1
40 TABLET ORAL 2 TIMES DAILY
Status: DISCONTINUED | OUTPATIENT
Start: 2023-03-26 | End: 2023-03-27

## 2023-03-26 RX ORDER — MIDAZOLAM HYDROCHLORIDE 1 MG/ML
INJECTION INTRAMUSCULAR; INTRAVENOUS PRN
Status: DISCONTINUED | OUTPATIENT
Start: 2023-03-26 | End: 2023-03-26 | Stop reason: SDUPTHER

## 2023-03-26 RX ORDER — LABETALOL HYDROCHLORIDE 5 MG/ML
5 INJECTION, SOLUTION INTRAVENOUS ONCE
Status: DISCONTINUED | OUTPATIENT
Start: 2023-03-26 | End: 2023-04-04 | Stop reason: HOSPADM

## 2023-03-26 RX ORDER — SPIRONOLACTONE 25 MG/1
50 TABLET ORAL DAILY
Status: DISCONTINUED | OUTPATIENT
Start: 2023-03-26 | End: 2023-03-27

## 2023-03-26 RX ORDER — SODIUM CHLORIDE 9 MG/ML
INJECTION, SOLUTION INTRAVENOUS PRN
Status: DISCONTINUED | OUTPATIENT
Start: 2023-03-26 | End: 2023-03-26

## 2023-03-26 RX ORDER — FENTANYL CITRATE 50 UG/ML
25 INJECTION, SOLUTION INTRAMUSCULAR; INTRAVENOUS EVERY 5 MIN PRN
Status: DISCONTINUED | OUTPATIENT
Start: 2023-03-26 | End: 2023-03-26 | Stop reason: HOSPADM

## 2023-03-26 RX ORDER — PROPOFOL 10 MG/ML
INJECTION, EMULSION INTRAVENOUS CONTINUOUS PRN
Status: DISCONTINUED | OUTPATIENT
Start: 2023-03-26 | End: 2023-03-26 | Stop reason: SDUPTHER

## 2023-03-26 RX ORDER — ENOXAPARIN SODIUM 100 MG/ML
30 INJECTION SUBCUTANEOUS 2 TIMES DAILY
Status: DISCONTINUED | OUTPATIENT
Start: 2023-03-26 | End: 2023-04-04 | Stop reason: HOSPADM

## 2023-03-26 RX ORDER — PROPOFOL 10 MG/ML
5-50 INJECTION, EMULSION INTRAVENOUS CONTINUOUS
Status: DISCONTINUED | OUTPATIENT
Start: 2023-03-26 | End: 2023-03-26

## 2023-03-26 RX ORDER — SODIUM CHLORIDE, SODIUM LACTATE, POTASSIUM CHLORIDE, CALCIUM CHLORIDE 600; 310; 30; 20 MG/100ML; MG/100ML; MG/100ML; MG/100ML
INJECTION, SOLUTION INTRAVENOUS CONTINUOUS
Status: DISCONTINUED | OUTPATIENT
Start: 2023-03-26 | End: 2023-03-27

## 2023-03-26 RX ORDER — ATORVASTATIN CALCIUM 20 MG/1
20 TABLET, FILM COATED ORAL NIGHTLY
Status: DISCONTINUED | OUTPATIENT
Start: 2023-03-26 | End: 2023-03-27

## 2023-03-26 RX ORDER — SODIUM CHLORIDE 0.9 % (FLUSH) 0.9 %
5-40 SYRINGE (ML) INJECTION PRN
Status: DISCONTINUED | OUTPATIENT
Start: 2023-03-26 | End: 2023-04-04 | Stop reason: HOSPADM

## 2023-03-26 RX ORDER — MAGNESIUM SULFATE HEPTAHYDRATE 40 MG/ML
4000 INJECTION, SOLUTION INTRAVENOUS ONCE
Status: COMPLETED | OUTPATIENT
Start: 2023-03-26 | End: 2023-03-26

## 2023-03-26 RX ORDER — LACTULOSE 10 G/15ML
20 SOLUTION ORAL 3 TIMES DAILY
Status: DISCONTINUED | OUTPATIENT
Start: 2023-03-26 | End: 2023-03-27

## 2023-03-26 RX ORDER — NOREPINEPHRINE BIT/0.9 % NACL 16MG/250ML
1-100 INFUSION BOTTLE (ML) INTRAVENOUS CONTINUOUS
Status: DISCONTINUED | OUTPATIENT
Start: 2023-03-26 | End: 2023-03-26

## 2023-03-26 RX ORDER — CHLORHEXIDINE GLUCONATE 0.12 MG/ML
15 RINSE ORAL 2 TIMES DAILY
Status: DISCONTINUED | OUTPATIENT
Start: 2023-03-26 | End: 2023-03-26

## 2023-03-26 RX ORDER — FENTANYL CITRATE 50 UG/ML
50 INJECTION, SOLUTION INTRAMUSCULAR; INTRAVENOUS ONCE
Status: COMPLETED | OUTPATIENT
Start: 2023-03-26 | End: 2023-03-26

## 2023-03-26 RX ORDER — FENTANYL CITRATE 50 UG/ML
50 INJECTION, SOLUTION INTRAMUSCULAR; INTRAVENOUS
Status: DISCONTINUED | OUTPATIENT
Start: 2023-03-27 | End: 2023-04-04

## 2023-03-26 RX ORDER — ONDANSETRON 2 MG/ML
4 INJECTION INTRAMUSCULAR; INTRAVENOUS EVERY 6 HOURS PRN
Status: DISCONTINUED | OUTPATIENT
Start: 2023-03-26 | End: 2023-04-04 | Stop reason: HOSPADM

## 2023-03-26 RX ORDER — SODIUM CHLORIDE 0.9 % (FLUSH) 0.9 %
5-40 SYRINGE (ML) INJECTION PRN
Status: DISCONTINUED | OUTPATIENT
Start: 2023-03-26 | End: 2023-03-26 | Stop reason: HOSPADM

## 2023-03-26 RX ORDER — SODIUM CHLORIDE 0.9 % (FLUSH) 0.9 %
5-40 SYRINGE (ML) INJECTION EVERY 12 HOURS SCHEDULED
Status: DISCONTINUED | OUTPATIENT
Start: 2023-03-26 | End: 2023-03-26 | Stop reason: HOSPADM

## 2023-03-26 RX ORDER — SODIUM CHLORIDE 0.9 % (FLUSH) 0.9 %
5-40 SYRINGE (ML) INJECTION EVERY 12 HOURS SCHEDULED
Status: DISCONTINUED | OUTPATIENT
Start: 2023-03-26 | End: 2023-03-27

## 2023-03-26 RX ORDER — SODIUM CHLORIDE 9 MG/ML
INJECTION, SOLUTION INTRAVENOUS PRN
Status: DISCONTINUED | OUTPATIENT
Start: 2023-03-26 | End: 2023-03-27

## 2023-03-26 RX ORDER — SODIUM CHLORIDE 9 MG/ML
INJECTION, SOLUTION INTRAVENOUS PRN
Status: DISCONTINUED | OUTPATIENT
Start: 2023-03-26 | End: 2023-03-26 | Stop reason: HOSPADM

## 2023-03-26 RX ADMIN — SODIUM CHLORIDE: 9 INJECTION, SOLUTION INTRAVENOUS at 09:01

## 2023-03-26 RX ADMIN — CHLORHEXIDINE GLUCONATE 15 ML: 1.2 RINSE ORAL at 08:28

## 2023-03-26 RX ADMIN — SODIUM CHLORIDE, POTASSIUM CHLORIDE, SODIUM LACTATE AND CALCIUM CHLORIDE: 600; 310; 30; 20 INJECTION, SOLUTION INTRAVENOUS at 01:42

## 2023-03-26 RX ADMIN — ROCURONIUM BROMIDE 50 MG: 10 INJECTION, SOLUTION INTRAVENOUS at 00:17

## 2023-03-26 RX ADMIN — PIPERACILLIN AND TAZOBACTAM 3375 MG: 3; .375 INJECTION, POWDER, LYOPHILIZED, FOR SOLUTION INTRAVENOUS at 03:37

## 2023-03-26 RX ADMIN — PROPOFOL 20 MCG/KG/MIN: 10 INJECTION, EMULSION INTRAVENOUS at 00:03

## 2023-03-26 RX ADMIN — FENTANYL CITRATE 50 MCG: 50 INJECTION, SOLUTION INTRAMUSCULAR; INTRAVENOUS at 23:19

## 2023-03-26 RX ADMIN — SODIUM CHLORIDE, PRESERVATIVE FREE 10 ML: 5 INJECTION INTRAVENOUS at 21:42

## 2023-03-26 RX ADMIN — FUROSEMIDE 40 MG: 40 TABLET ORAL at 18:41

## 2023-03-26 RX ADMIN — MAGNESIUM SULFATE HEPTAHYDRATE 4000 MG: 40 INJECTION, SOLUTION INTRAVENOUS at 02:53

## 2023-03-26 RX ADMIN — SODIUM CHLORIDE, PRESERVATIVE FREE 20 MG: 5 INJECTION INTRAVENOUS at 03:01

## 2023-03-26 RX ADMIN — MIDAZOLAM 2 MG: 1 INJECTION INTRAMUSCULAR; INTRAVENOUS at 00:17

## 2023-03-26 RX ADMIN — SODIUM CHLORIDE, POTASSIUM CHLORIDE, SODIUM LACTATE AND CALCIUM CHLORIDE: 600; 310; 30; 20 INJECTION, SOLUTION INTRAVENOUS at 17:33

## 2023-03-26 RX ADMIN — SODIUM CHLORIDE, PRESERVATIVE FREE 40 MG: 5 INJECTION INTRAVENOUS at 17:21

## 2023-03-26 RX ADMIN — SODIUM CHLORIDE, PRESERVATIVE FREE 10 ML: 5 INJECTION INTRAVENOUS at 08:44

## 2023-03-26 RX ADMIN — SODIUM CHLORIDE, POTASSIUM CHLORIDE, SODIUM LACTATE AND CALCIUM CHLORIDE: 600; 310; 30; 20 INJECTION, SOLUTION INTRAVENOUS at 09:35

## 2023-03-26 RX ADMIN — Medication 50 MCG/HR: at 01:48

## 2023-03-26 RX ADMIN — PROPOFOL 20 MCG/KG/MIN: 10 INJECTION, EMULSION INTRAVENOUS at 01:57

## 2023-03-26 RX ADMIN — PIPERACILLIN AND TAZOBACTAM 3375 MG: 3; .375 INJECTION, POWDER, LYOPHILIZED, FOR SOLUTION INTRAVENOUS at 17:29

## 2023-03-26 RX ADMIN — POTASSIUM CHLORIDE 20 MEQ: 29.8 INJECTION, SOLUTION INTRAVENOUS at 02:43

## 2023-03-26 RX ADMIN — ENOXAPARIN SODIUM 30 MG: 100 INJECTION SUBCUTANEOUS at 20:10

## 2023-03-26 RX ADMIN — SODIUM CHLORIDE, PRESERVATIVE FREE 20 MG: 5 INJECTION INTRAVENOUS at 08:45

## 2023-03-26 RX ADMIN — PIPERACILLIN AND TAZOBACTAM 3375 MG: 3; .375 INJECTION, POWDER, LYOPHILIZED, FOR SOLUTION INTRAVENOUS at 09:07

## 2023-03-26 RX ADMIN — FENTANYL CITRATE 50 MCG: 50 INJECTION, SOLUTION INTRAMUSCULAR; INTRAVENOUS at 00:17

## 2023-03-26 RX ADMIN — SPIRONOLACTONE 50 MG: 25 TABLET ORAL at 20:11

## 2023-03-26 RX ADMIN — LACTULOSE 20 G: 20 SOLUTION ORAL at 20:11

## 2023-03-26 RX ADMIN — ATORVASTATIN CALCIUM 20 MG: 20 TABLET, FILM COATED ORAL at 20:11

## 2023-03-26 RX ADMIN — POTASSIUM CHLORIDE 20 MEQ: 29.8 INJECTION, SOLUTION INTRAVENOUS at 03:59

## 2023-03-26 ASSESSMENT — PAIN SCALES - GENERAL
PAINLEVEL_OUTOF10: 5
PAINLEVEL_OUTOF10: 0
PAINLEVEL_OUTOF10: 0

## 2023-03-26 ASSESSMENT — PAIN DESCRIPTION - PAIN TYPE: TYPE: ACUTE PAIN;SURGICAL PAIN

## 2023-03-26 ASSESSMENT — PULMONARY FUNCTION TESTS
PIF_VALUE: 15
PIF_VALUE: 21
PIF_VALUE: 21
PIF_VALUE: 22
PIF_VALUE: 20
PIF_VALUE: 21
PIF_VALUE: 16
PIF_VALUE: 17
PIF_VALUE: 22
PIF_VALUE: 21
PIF_VALUE: 21
PIF_VALUE: 22
PIF_VALUE: 18
PIF_VALUE: 22
PIF_VALUE: 19
PIF_VALUE: 15

## 2023-03-26 ASSESSMENT — PAIN DESCRIPTION - LOCATION: LOCATION: ABDOMEN

## 2023-03-26 NOTE — PLAN OF CARE
Problem: Safety - Adult  Goal: Free from fall injury  3/26/2023 0830 by Mike Silva RCP  Outcome: Progressing  3/26/2023 0649 by Anatoliy Marina RN  Outcome: Progressing     Problem: Discharge Planning  Goal: Discharge to home or other facility with appropriate resources  3/26/2023 0830 by Mike Silva RCP  Outcome: Progressing  Flowsheets (Taken 3/26/2023 0800 by Tierra Smith, JERI)  Discharge to home or other facility with appropriate resources:   Identify barriers to discharge with patient and caregiver   Refer to discharge planning if patient needs post-hospital services based on physician order or complex needs related to functional status, cognitive ability or social support system  3/26/2023 0649 by Anatoliy Marina RN  Outcome: Progressing     Problem: Pain  Goal: Verbalizes/displays adequate comfort level or baseline comfort level  3/26/2023 0830 by Mike Silva RCP  Outcome: Progressing  3/26/2023 0649 by Anatoliy Marina, RN  Outcome: Progressing     Problem: Respiratory - Adult  Goal: Achieves optimal ventilation and oxygenation  3/26/2023 0830 by Mike Silva RCP  Outcome: Progressing

## 2023-03-26 NOTE — PROGRESS NOTES
[email or web address] called and bed request placed for [facility]. Situation explained to [email or web address]s RN. If surgery can't do the surgery at [facility] then a bed [facility] will need to [facility] placed [facility]. DR. Trent Frye number was given. Prior to calling the access center writer called and notified him of the Ct abd results that were called into Dr. Tamiko Angela who stated the pt needed to be transffered. If pt does need a [facility] bed a COVID will need to be done .      Dr. Guanaco Harp came up to the room and notified the pt of the CT results and the need to transfer to [facility]. [facility]

## 2023-03-26 NOTE — BRIEF OP NOTE
Brief Postoperative Note      Patient: Hardik Yang  YOB: 1960  MRN: [id number]    Date of Procedure: 3/25/2023    Pre-Op Diagnosis: Bowel perforation ([address] [K63.1]    Post-Op Diagnosis: Same with ischemic necrosis of right colon with perforation and feculent peritonitis       Procedure(s):  LAPAROTOMY EXPLORATORY, RIGHT COLECTOMY, CREATION IF END ILEOSTOMY    Surgeon(s):  Jd Metcalf DO    Assistant:  Donnie Kunz DO PGY4    Anesthesia: General    Estimated Blood Loss (mL): 319    Complications: None    Specimens:   ID Type Source Tests Collected by Time Destination   A : RIGHT COLON Tissue Colon SURGICAL PATHOLOGY Jd Metcalf DO 3/25/2023 2323        Implants:  none      Drains:   NG/OG/NJ/NE Tube Nasogastric 16 fr Right nostril (Active)       Ileostomy/Jejunostomy RLQ Ileostomy (Active)       [REMOVED] External Urinary Catheter (Removed)   Site Assessment Clean,dry & intact 03/19/23 1905   Placement Replaced 03/19/23 0745   Securement Method Securing device (Describe) 03/19/23 1905   Catheter Care Catheter/Wick replaced;Suction Canister/Tubing changed 03/19/23 1905   Perineal Care Yes 03/19/23 0745   Suction 40 mmgHg continuous 03/19/23 1905   Urine Color Deanna 03/19/23 1905   Urine Appearance Cloudy 03/19/23 1905   Urine Odor Malodorous 03/18/23 1410       Findings:  necrosis involving the right and ascending colon, feculent peritonitis    Electronically signed by Donnie Kunz DO on 3/26/2023 at 12:36 AM

## 2023-03-26 NOTE — PROGRESS NOTES
RN contacted Veena León at [facility] and gave report. Informed of situation, abnormal labs, diagnostics and Meds given. Alll questions answered.

## 2023-03-26 NOTE — ANESTHESIA PRE PROCEDURE
IntraVENous PRN Cleatus Pettus, APRN - CRNA   25 g at 03/25/23 2257    rocuronium [facility]) injection   IntraVENous PRN Cleatus Pettus, APRN - CRNA   50 mg at 03/25/23 2301    cefepime (MAXIPIME) injection   IntraVENous PRN Cleatus Pettus, APRN - CRNA   2 g at 03/25/23 2251    metronidazole (FLAGYL) 500 mg in 0.9% NaCl 100 mL IVPB premix   IntraVENous PRN Cleatus Pettus, APRN - CRNA   500 mg at 03/25/23 2256    succinylcholine chloride (ANECTINE) injection   IntraVENous PRN Cleatus Pettus, APRN - CRNA   100 mg at 03/25/23 2246    fentaNYL (SUBLIMAZE) injection   IntraVENous PRN Cleatus Pettus, APRN - CRNA   50 mcg at 03/25/23 2244    lidocaine PF 1 % injection   IntraVENous PRN Cleatus Pettus, APRN - CRNA   5 mL at 03/25/23 2244    propofol injection   IntraVENous PRN Cleatus Pettus, APRN - CRNA   140 mg at 03/25/23 2244    dexamethasone (DECADRON) injection   IntraVENous PRN Cleatus Pettus, APRN - CRNA   10 mg at 03/25/23 2256       Allergies:  No Known Allergies    Problem List:    Patient Active Problem List   Diagnosis Code    Back pain, chronic M54.9, G89.29    Hearing difficulty H91.90    GERD (gastroesophageal reflux disease) K21.9    Cervical radicular pain M54.12    Hypomagnesemia E83.42    Chronic viral hepatitis B without delta agent and without coma (HCC) B18.1    Calculus of gallbladder without cholecystitis K80.20    Hep C w/o coma, chronic (HCC) B18.2    Fatty liver K76.0    Psychophysiologic insomnia F51.04    Cirrhosis (HCC) K74.60    Decompensation of cirrhosis of liver (HCC) K72.90, K74.60    Vertebrogenic low back pain M54.51    DDD (degenerative disc disease), lumbar M51.36    Depression F32. A    Tubular adenoma of colon D12.6    History of colon polyps Z86.010    Gynecomastia, male N58    Lumbar radiculitis M54.16    Lumbar disc herniation M51.26    Tinnitus H93.19    Eustachian tube dysfunction H69.80    Ganglion cyst M67.40

## 2023-03-26 NOTE — ANESTHESIA PROCEDURE NOTES
Arterial Line:    An arterial line was placed using ultrasound guidance, in the OR for the following indication(s): continuous blood pressure monitoring and blood sampling needed. A  (size) (length), Arrow (type) catheter was placed, Seldinger technique used, into the left radial artery, secured by Tegaderm. Anesthesia type: General    Events:  patient tolerated procedure well with no complications. 3/25/2023 10:50 PM3/25/2023 10:55 PM  Anesthesiologist: Kayla Walton MD  Performed: Anesthesiologist   Preanesthetic Checklist  Completed: patient identified, IV checked, site marked, risks and benefits discussed, surgical/procedural consents, equipment checked, pre-op evaluation, timeout performed, anesthesia consent given, oxygen available, monitors applied/VS acknowledged, fire risk safety assessment completed and verbalized and blood product R/B/A discussed and consented

## 2023-03-26 NOTE — ANESTHESIA PROCEDURE NOTES
Central Venous Line:    A central venous line was placed using ultrasound guidance, in the OR for the following indication(s): central venous access and CVP monitoring. 3/26/2023 12:05 AM3/26/2023 12:10 AM    Sterility preparation included the following: hand hygiene performed prior to procedure, maximum sterile barriers used and sterile technique used to drape from head to toe. The patient was placed in Trendelenburg position. The left internal jugular vein was prepped. The site was prepped with Chloraprep. A 7 Fr (size), introducer triple lumen was placed. During the procedure, the following specific steps were taken: target vein identified, needle advanced into vein and blood aspirated and guidewire advanced into vein. Intravenous verification was obtained by ultrasound, venous blood return and manometry. Post insertion care included: all ports aspirated, all ports flushed easily, guidewire removed intact and line sutured in place. During the procedure the patient experienced: patient tolerated procedure well with no complications.       Outcomes: uncomplicated and patient tolerated procedure wellno  Anesthesia type: general..No  Staffing  Performed: Anesthesiologist   Anesthesiologist: Tati Katz MD  Preanesthetic Checklist  Completed: patient identified, IV checked, site marked, risks and benefits discussed, surgical/procedural consents, equipment checked, pre-op evaluation, timeout performed, anesthesia consent given, oxygen available, monitors applied/VS acknowledged, fire risk safety assessment completed and verbalized and blood product R/B/A discussed and consented

## 2023-03-26 NOTE — ANESTHESIA POSTPROCEDURE EVALUATION
Department of Anesthesiology  Postprocedure Note    Patient: Rachele Espinosa  MRN: [id number]  YOB: 1960  Date of evaluation: 3/26/2023      Procedure Summary     Date: 03/25/23 Room / Location: [address]    Anesthesia Start: 2237 Anesthesia Stop: 03/26/23 0051    Procedure: LAPAROTOMY EXPLORATORY, RIGHT COLECTOMY, CREATION IF END ILEOSTOMY Diagnosis:       Bowel perforation ([address]      (Bowel perforation ([address] [K63.1])    Surgeons: Joanna Carlos DO Responsible Provider: Cait Severino MD    Anesthesia Type: general ASA Status: 4 - Emergent          Anesthesia Type: No value filed.     Yen Phase I:      Yen Phase II:        Anesthesia Post Evaluation    Patient location during evaluation: ICU  Patient participation: complete - patient cannot participate  Level of consciousness: sedated and ventilated  Airway patency: patent  Complications: no  Cardiovascular status: vasoactive/inotropes  Respiratory status: ventilator

## 2023-03-26 NOTE — H&P
of cirrhosis who presents with free air in the abdomen and CT findings concerning for colonic infarction. Plan:  -Patient seen and evaluated. History, physical exam, laboratory, and radiographic findings reviewed and discussed with attending.  -Proceeding emergently to the OR for ex-lap, possible bowel resection, possible ostomy creation. The procedure, risks, benefits, and alternatives were explained. All questions were answered. Written consent was obtained. Questions are answered with the patient and his family.  -Will reassess post-op. Electronically signed by Lito Lopez DO  on 3/25/2023 at 10:14 PM    I attest that I was present with the resident during the patient's evaluation and agree with the description of findings and plan as dictated above.   Corinne Akhtar MD

## 2023-03-26 NOTE — PROGRESS NOTES
Called by the nurse at the bedside for critically ill patient,   Patient was admitted with hematemesis and melena, seen by gastroenterology and general surgery, acute abdominal pain today, stat CT was done resulted at 6:23 PM, stat read was called to the general surgery Dr. Oseas Hansen. Subsequently advised to transfer the patient to [facility], I was called by the charge nurse to facilitate the transfer, I was personally not involved in the treatment of this patient,  I reviewed the chart, discussed with general surgery, discussed with Dr. Colleen Jeffrey who has been attending physician for the patient for last 48 hours,  CT of the abdomen and pelvis discussed with general surgery,  I went to the bedside, discussed with patient and his wife about the current diagnosis and explained subsequent plan of care as planned by general surgery, which is to transfer to [address] [place] [address] for higher level of care as patient is critically ill with extensive comorbid condition as extensive alcoholism, severe cirrhosis, anemia, coagulopathy secondary to alcoholism, chronic encephalopathy, s/p TIPS,  Stat labs CBC, CMP, lactate ordered,  Start antibiotics cefepime, Flagyl, vancomycin given,  [facility] was called, I discussed the case with Dr. Gaetano Essex who is on-call for [address] [place] [address], accepted the patient in surgical ICU, emergently getting transferred to [address] [place] [address], communication loop was closed. CT abdomen pelvis with IV contrast  1. Alarming signs of bowel infarction involving the right colon with   extensive pneumatosis and thickening. 2. Large amount of free air in the anterior abdomen. Bowel perforation is   suspected. 3. Inflammatory changes in the mesentery and mild ascites. 4. Severe liver cirrhosis and TIPS shunt which is patent. 5. Cholelithiasis but no acute cholecystitis.    6. Redemonstration of stable splenic artery aneurysm measuring 2.4 x 3.1 cm.   7. Moderate right and small left bilateral pleural effusions and lower lobe   atelectatic changes. Moderate pericardial effusion redemonstrated. Findings discussed with Dr. Sondra Montelongo, on 03/25/2023 at 7:30 p.m. RECOMMENDATIONS:   STAT surgical consult for bowel perforation and infarction.

## 2023-03-26 NOTE — OP NOTE
Operative Note      Patient: Talisha Rosa  YOB: 1960  MRN: [id number]    Date of Procedure: 3/25/2023    Pre-Op Diagnosis: Bowel perforation ([facility] [address] [K63.1]    Post-Op Diagnosis: Same with necrotic infarction of right colon and feculent peritonitis       Procedure: Exploratory laparotomy right colectomy with end ileostomy    Surgeon(s):  Partha Guzman DO     Assistant:   Reba Rich DO PGY4    Anesthesia: General    Estimated Blood Loss (mL): 810    Complications: None    Specimens:   ID Type Source Tests Collected by Time Destination   A : RIGHT COLON Tissue Colon SURGICAL PATHOLOGY Partha Guzman DO 3/25/2023 2323        Implants:  * No implants in log *      Drains:   NG/OG/NJ/NE Tube Nasogastric 16 fr Right nostril (Active)   Surrounding Skin Clean, dry & intact 03/26/23 0400   Securement device Tape 03/26/23 0400   Status Suction-low intermittent 03/26/23 0400   Placement Verified Gastric Contents 03/26/23 0400   NG/OG/NJ/NE External Measurement (cm) 77 cm 03/26/23 0400   Drainage Appearance Bile 03/26/23 0400       Ileostomy/Jejunostomy RLQ Ileostomy (Active)   Stomal Appliance Clean, dry & intact 03/26/23 0400   Stoma  Assessment Pink;Red 03/26/23 0400   Peristomal Assessment Clean, dry & intact 03/26/23 0400   Stool Appearance Bloody; Watery 03/26/23 0400   Stool Color Red 03/26/23 0400   Output (mL) 25 ml 03/26/23 0400       Urinary Catheter 03/26/23 Neal-Temperature (Active)   Catheter Indications Need for fluid volume management of the critically ill patient in a critical care setting 03/26/23 0400   Site Assessment No urethral drainage 03/26/23 0400   Urine Color Yellow 03/26/23 0400   Urine Appearance Clear 03/26/23 0400   Urine Odor Malodorous 03/26/23 0400   Collection Container Standard 03/26/23 0400   Securement Method Securing device (Describe) 03/26/23 0400   Catheter Best Practices  Drainage tube clipped to bed;Catheter secured to thigh; Tamper seal intact; Bag below

## 2023-03-26 NOTE — PLAN OF CARE
Problem: Safety - Adult  Goal: Free from fall injury  Outcome: Progressing     Problem: Discharge Planning  Goal: Discharge to home or other facility with appropriate resources  Outcome: Progressing     Problem: Safety - Medical Restraint  Goal: Remains free of injury from restraints (Restraint for Interference with Medical Device)  Description: INTERVENTIONS:  1. Determine that other, less restrictive measures have been tried or would not be effective before applying the restraint  2. Evaluate the patient's condition at the time of restraint application  3. Inform patient/family regarding the reason for restraint  4.  Q2H: Monitor safety, psychosocial status, comfort, nutrition and hydration  Outcome: Progressing     Problem: Pain  Goal: Verbalizes/displays adequate comfort level or baseline comfort level  Outcome: Progressing

## 2023-03-26 NOTE — PROGRESS NOTES
Transport  here to pickup patient. Report given by RN. Patient transferred to [facility] without complications and taken to [facility].

## 2023-03-26 NOTE — CONSULTS
°C)  Min: 97 °F (36.1 °C)  Max: 98.2 °F (22.3 °C) BP Systolic (86YRP), OMS:196 , Min:101 , XNE:874   Diastolic (26IFJ), YBX:44, Min:60, Max:75   Pulse Pulse  Av.8  Min: 75  Max: 107 Resp Resp  Av  Min: 15  Max: 20 Pulse ox SpO2  Av.9 %  Min: 98 %  Max: 100 %    CONSTITUTIONAL: Intubated, sedated  HEENT: PERRLA, trachea midline. ETT in place. LUNGS: CTAB  CV: RRR  GI: Abd soft. Ileostomy in place. MUSCULOSKELETAL: Bilateral pitting edema. No cyanosis  NEUROLOGIC: Sedated  SKIN: No erythema. Incision c/d/i        Drain/tube output:  Ileostomy output 25 mL    LAB:  CBC:   Recent Labs     23  2118 23  2330 23  0124 23  0440 23  0534   WBC 6.8  --  14.1*  --  8.7   HGB 7.9* 5.9* 5.1* 7.2* 7.2*   HCT 27.0* 21.3* 18.7* 24.0* 23.5*   MCV 73.0*  --  81.0*  --  80.5*    173 164  --  112*     BMP:   Recent Labs     23  2036 23  2330 23  0124 23  0534   * 135* 131* 131*   K 3.9 3.4* 3.4* 4.7     --  102 103   CO2 20  --  19* 18*   BUN 13  --  12 11   CREATININE 0.42*  --  0.57* 0.48*   GLUCOSE 150*  --  148* 210*         RADIOLOGY:  CT ABDOMEN PELVIS W IV CONTRAST Additional Contrast? None  Result Date: 3/25/2023  1. Alarming signs of bowel infarction involving the right colon with extensive pneumatosis and thickening. 2. Large amount of free air in the anterior abdomen. Bowel perforation is suspected. 3. Inflammatory changes in the mesentery and mild ascites. 4. Severe liver cirrhosis and TIPS shunt which is patent. 5. Cholelithiasis but no acute cholecystitis. 6. Redemonstration of stable splenic artery aneurysm measuring 2.4 x 3.1 cm. 7. Moderate right and small left bilateral pleural effusions and lower lobe atelectatic changes. Moderate pericardial effusion redemonstrated.      XR CHEST PORTABLE  Result Date: 3/26/2023  Stable chest.       Argelia Ferris MD  3/26/23, 7:13 AM

## 2023-03-27 LAB
ABO/RH: NORMAL
ABSOLUTE EOS #: 0 K/UL (ref 0–0.4)
ABSOLUTE IMMATURE GRANULOCYTE: 0.09 K/UL (ref 0–0.3)
ABSOLUTE LYMPH #: 0.28 K/UL (ref 1–4.8)
ABSOLUTE MONO #: 1.69 K/UL (ref 0.1–0.8)
ACTION: NORMAL
ALBUMIN SERPL-MCNC: 2.5 G/DL (ref 3.5–5.2)
ALBUMIN/GLOBULIN RATIO: 1.2 (ref 1–2.5)
ALLEN TEST: ABNORMAL
ALP SERPL-CCNC: 79 U/L (ref 40–129)
ALT SERPL-CCNC: 10 U/L (ref 5–41)
ANION GAP SERPL CALCULATED.3IONS-SCNC: 7 MMOL/L (ref 9–17)
ANTIBODY SCREEN: NEGATIVE
ARM BAND NUMBER: NORMAL
AST SERPL-CCNC: 25 U/L
BASOPHILS # BLD: 0 % (ref 0–2)
BASOPHILS ABSOLUTE: 0 K/UL (ref 0–0.2)
BILIRUB DIRECT SERPL-MCNC: 1 MG/DL
BILIRUB INDIRECT SERPL-MCNC: 1.6 MG/DL (ref 0–1)
BILIRUB SERPL-MCNC: 2.6 MG/DL (ref 0.3–1.2)
BLD PROD TYP BPU: NORMAL
BLOOD BANK BLOOD PRODUCT EXPIRATION DATE: NORMAL
BLOOD BANK ISBT PRODUCT BLOOD TYPE: 2800
BLOOD BANK ISBT PRODUCT BLOOD TYPE: 5100
BLOOD BANK ISBT PRODUCT BLOOD TYPE: 8400
BLOOD BANK PRODUCT CODE: NORMAL
BLOOD BANK UNIT TYPE AND RH: NORMAL
BPU ID: NORMAL
BUN SERPL-MCNC: 12 MG/DL (ref 8–23)
CALCIUM SERPL-MCNC: 8.1 MG/DL (ref 8.6–10.4)
CHLORIDE SERPL-SCNC: 109 MMOL/L (ref 98–107)
CO2 SERPL-SCNC: 23 MMOL/L (ref 20–31)
CREAT SERPL-MCNC: 0.57 MG/DL (ref 0.7–1.2)
CROSSMATCH RESULT: NORMAL
DATE AND TIME: NORMAL
DISPENSE STATUS BLOOD BANK: NORMAL
EKG ATRIAL RATE: 102 BPM
EKG P AXIS: 76 DEGREES
EKG P-R INTERVAL: 170 MS
EKG Q-T INTERVAL: 380 MS
EKG QRS DURATION: 92 MS
EKG QTC CALCULATION (BAZETT): 495 MS
EKG R AXIS: 71 DEGREES
EKG T AXIS: -9 DEGREES
EKG VENTRICULAR RATE: 102 BPM
EOSINOPHILS RELATIVE PERCENT: 0 % (ref 1–4)
EXPIRATION DATE: NORMAL
FIO2: 50
GFR SERPL CREATININE-BSD FRML MDRD: >60 ML/MIN/1.73M2
GLUCOSE BLD-MCNC: 136 MG/DL (ref 74–100)
GLUCOSE SERPL-MCNC: 123 MG/DL (ref 70–99)
HCT VFR BLD AUTO: 24.6 % (ref 40.7–50.3)
HGB BLD-MCNC: 7.6 G/DL (ref 13–17)
IMMATURE GRANULOCYTES: 1 %
INR PPP: 1.6
LYMPHOCYTES # BLD: 3 % (ref 24–44)
MAGNESIUM SERPL-MCNC: 2.1 MG/DL (ref 1.6–2.6)
MCH RBC QN AUTO: 24.8 PG (ref 25.2–33.5)
MCHC RBC AUTO-ENTMCNC: 30.9 G/DL (ref 28.4–34.8)
MCV RBC AUTO: 80.4 FL (ref 82.6–102.9)
MODE: ABNORMAL
MONOCYTES # BLD: 18 % (ref 1–7)
MORPHOLOGY: ABNORMAL
MORPHOLOGY: ABNORMAL
NEGATIVE BASE EXCESS, ART: 7 (ref 0–2)
NOTIFY: NORMAL
NRBC AUTOMATED: 0 PER 100 WBC
O2 DEVICE/FLOW/%: ABNORMAL
PDW BLD-RTO: 19.2 % (ref 11.8–14.4)
PHOSPHATE SERPL-MCNC: 3.3 MG/DL (ref 2.5–4.5)
PLATELET # BLD AUTO: 142 K/UL (ref 138–453)
PMV BLD AUTO: 10.5 FL (ref 8.1–13.5)
POC HCO3: 20.3 MMOL/L (ref 21–28)
POC LACTIC ACID: 1.77 MMOL/L (ref 0.56–1.39)
POC O2 SATURATION: 99 % (ref 94–98)
POC PCO2: 52.1 MM HG (ref 35–48)
POC PH: 7.2 (ref 7.35–7.45)
POC PO2: 179.8 MM HG (ref 83–108)
POTASSIUM SERPL-SCNC: 3.7 MMOL/L (ref 3.7–5.3)
PROT SERPL-MCNC: 4.6 G/DL (ref 6.4–8.3)
PROTHROMBIN TIME: 19 SEC (ref 11.7–14.9)
RBC # BLD: 3.06 M/UL (ref 4.21–5.77)
READ BACK: YES
SAMPLE SITE: ABNORMAL
SEG NEUTROPHILS: 78 % (ref 36–66)
SEGMENTED NEUTROPHILS ABSOLUTE COUNT: 7.34 K/UL (ref 1.8–7.7)
SODIUM SERPL-SCNC: 139 MMOL/L (ref 135–144)
TRANSFUSION STATUS: NORMAL
UNIT DIVISION: 0
UNIT ISSUE DATE/TIME: NORMAL
WBC # BLD AUTO: 9.4 K/UL (ref 3.5–11.3)

## 2023-03-27 PROCEDURE — 2580000003 HC RX 258: Performed by: SURGERY

## 2023-03-27 PROCEDURE — 6360000002 HC RX W HCPCS: Performed by: STUDENT IN AN ORGANIZED HEALTH CARE EDUCATION/TRAINING PROGRAM

## 2023-03-27 PROCEDURE — 6370000000 HC RX 637 (ALT 250 FOR IP): Performed by: STUDENT IN AN ORGANIZED HEALTH CARE EDUCATION/TRAINING PROGRAM

## 2023-03-27 PROCEDURE — 97166 OT EVAL MOD COMPLEX 45 MIN: CPT

## 2023-03-27 PROCEDURE — 97530 THERAPEUTIC ACTIVITIES: CPT

## 2023-03-27 PROCEDURE — 85025 COMPLETE CBC W/AUTO DIFF WBC: CPT

## 2023-03-27 PROCEDURE — 92523 SPEECH SOUND LANG COMPREHEN: CPT

## 2023-03-27 PROCEDURE — 83735 ASSAY OF MAGNESIUM: CPT

## 2023-03-27 PROCEDURE — 97162 PT EVAL MOD COMPLEX 30 MIN: CPT

## 2023-03-27 PROCEDURE — 2580000003 HC RX 258: Performed by: STUDENT IN AN ORGANIZED HEALTH CARE EDUCATION/TRAINING PROGRAM

## 2023-03-27 PROCEDURE — 94761 N-INVAS EAR/PLS OXIMETRY MLT: CPT

## 2023-03-27 PROCEDURE — 85610 PROTHROMBIN TIME: CPT

## 2023-03-27 PROCEDURE — A4216 STERILE WATER/SALINE, 10 ML: HCPCS | Performed by: STUDENT IN AN ORGANIZED HEALTH CARE EDUCATION/TRAINING PROGRAM

## 2023-03-27 PROCEDURE — C9113 INJ PANTOPRAZOLE SODIUM, VIA: HCPCS | Performed by: STUDENT IN AN ORGANIZED HEALTH CARE EDUCATION/TRAINING PROGRAM

## 2023-03-27 PROCEDURE — 84100 ASSAY OF PHOSPHORUS: CPT

## 2023-03-27 PROCEDURE — 80076 HEPATIC FUNCTION PANEL: CPT

## 2023-03-27 PROCEDURE — 80048 BASIC METABOLIC PNL TOTAL CA: CPT

## 2023-03-27 PROCEDURE — 2060000000 HC ICU INTERMEDIATE R&B

## 2023-03-27 RX ORDER — SPIRONOLACTONE 25 MG/1
50 TABLET ORAL DAILY
Status: DISCONTINUED | OUTPATIENT
Start: 2023-03-28 | End: 2023-04-04 | Stop reason: HOSPADM

## 2023-03-27 RX ORDER — LACTULOSE 10 G/15ML
20 SOLUTION ORAL 3 TIMES DAILY
Status: DISCONTINUED | OUTPATIENT
Start: 2023-03-27 | End: 2023-03-29

## 2023-03-27 RX ORDER — SODIUM CHLORIDE 9 MG/ML
INJECTION, SOLUTION INTRAVENOUS PRN
Status: DISCONTINUED | OUTPATIENT
Start: 2023-03-27 | End: 2023-04-04 | Stop reason: HOSPADM

## 2023-03-27 RX ORDER — FUROSEMIDE 40 MG/1
40 TABLET ORAL 2 TIMES DAILY
Status: DISCONTINUED | OUTPATIENT
Start: 2023-03-27 | End: 2023-04-04 | Stop reason: HOSPADM

## 2023-03-27 RX ORDER — ATORVASTATIN CALCIUM 20 MG/1
20 TABLET, FILM COATED ORAL NIGHTLY
Status: DISCONTINUED | OUTPATIENT
Start: 2023-03-27 | End: 2023-04-04 | Stop reason: HOSPADM

## 2023-03-27 RX ORDER — OXYCODONE HYDROCHLORIDE 5 MG/1
5 TABLET ORAL EVERY 6 HOURS PRN
Status: DISCONTINUED | OUTPATIENT
Start: 2023-03-27 | End: 2023-04-04 | Stop reason: HOSPADM

## 2023-03-27 RX ORDER — POTASSIUM CHLORIDE 20 MEQ/1
30 TABLET, EXTENDED RELEASE ORAL ONCE
Status: COMPLETED | OUTPATIENT
Start: 2023-03-27 | End: 2023-03-27

## 2023-03-27 RX ORDER — OXYCODONE HYDROCHLORIDE 5 MG/1
2.5 TABLET ORAL EVERY 6 HOURS PRN
Status: DISCONTINUED | OUTPATIENT
Start: 2023-03-27 | End: 2023-03-27

## 2023-03-27 RX ADMIN — PIPERACILLIN AND TAZOBACTAM 3375 MG: 3; .375 INJECTION, POWDER, LYOPHILIZED, FOR SOLUTION INTRAVENOUS at 01:32

## 2023-03-27 RX ADMIN — PIPERACILLIN AND TAZOBACTAM 3375 MG: 3; .375 INJECTION, POWDER, LYOPHILIZED, FOR SOLUTION INTRAVENOUS at 17:12

## 2023-03-27 RX ADMIN — LACTULOSE 20 G: 20 SOLUTION ORAL at 08:28

## 2023-03-27 RX ADMIN — ATORVASTATIN CALCIUM 20 MG: 20 TABLET, FILM COATED ORAL at 19:54

## 2023-03-27 RX ADMIN — SODIUM CHLORIDE, PRESERVATIVE FREE 40 MG: 5 INJECTION INTRAVENOUS at 08:26

## 2023-03-27 RX ADMIN — SODIUM CHLORIDE, POTASSIUM CHLORIDE, SODIUM LACTATE AND CALCIUM CHLORIDE: 600; 310; 30; 20 INJECTION, SOLUTION INTRAVENOUS at 11:09

## 2023-03-27 RX ADMIN — SPIRONOLACTONE 50 MG: 25 TABLET ORAL at 11:45

## 2023-03-27 RX ADMIN — FUROSEMIDE 40 MG: 40 TABLET ORAL at 08:28

## 2023-03-27 RX ADMIN — PIPERACILLIN AND TAZOBACTAM 3375 MG: 3; .375 INJECTION, POWDER, LYOPHILIZED, FOR SOLUTION INTRAVENOUS at 08:54

## 2023-03-27 RX ADMIN — ONDANSETRON 4 MG: 2 INJECTION INTRAMUSCULAR; INTRAVENOUS at 19:54

## 2023-03-27 RX ADMIN — FENTANYL CITRATE 50 MCG: 50 INJECTION, SOLUTION INTRAMUSCULAR; INTRAVENOUS at 08:49

## 2023-03-27 RX ADMIN — ENOXAPARIN SODIUM 30 MG: 100 INJECTION SUBCUTANEOUS at 19:54

## 2023-03-27 RX ADMIN — ENOXAPARIN SODIUM 30 MG: 100 INJECTION SUBCUTANEOUS at 08:26

## 2023-03-27 RX ADMIN — SODIUM CHLORIDE, PRESERVATIVE FREE 10 ML: 5 INJECTION INTRAVENOUS at 11:22

## 2023-03-27 RX ADMIN — OXYCODONE HYDROCHLORIDE 5 MG: 5 TABLET ORAL at 13:50

## 2023-03-27 RX ADMIN — FUROSEMIDE 40 MG: 40 TABLET ORAL at 17:14

## 2023-03-27 RX ADMIN — POTASSIUM CHLORIDE 30 MEQ: 1500 TABLET, EXTENDED RELEASE ORAL at 08:29

## 2023-03-27 RX ADMIN — FENTANYL CITRATE 50 MCG: 50 INJECTION, SOLUTION INTRAMUSCULAR; INTRAVENOUS at 05:24

## 2023-03-27 RX ADMIN — LACTULOSE 20 G: 20 SOLUTION ORAL at 19:54

## 2023-03-27 ASSESSMENT — PAIN SCALES - GENERAL
PAINLEVEL_OUTOF10: 7
PAINLEVEL_OUTOF10: 7
PAINLEVEL_OUTOF10: 6
PAINLEVEL_OUTOF10: 4

## 2023-03-27 ASSESSMENT — PAIN DESCRIPTION - ORIENTATION
ORIENTATION: MID;RIGHT
ORIENTATION: MID;RIGHT
ORIENTATION: RIGHT

## 2023-03-27 ASSESSMENT — PAIN DESCRIPTION - DESCRIPTORS
DESCRIPTORS: SHARP
DESCRIPTORS: STABBING;SHARP

## 2023-03-27 ASSESSMENT — PAIN DESCRIPTION - LOCATION
LOCATION: ABDOMEN

## 2023-03-27 NOTE — PLAN OF CARE
Problem: Safety - Adult  Goal: Free from fall injury  3/27/2023 0408 by Marcelina Ball RN  Outcome: Progressing     Problem: Discharge Planning  Goal: Discharge to home or other facility with appropriate resources  3/27/2023 0408 by Marcelina Ball RN  Outcome: Progressing     Problem: Pain  Goal: Verbalizes/displays adequate comfort level or baseline comfort level  3/27/2023 0408 by Marcelina Ball RN  Outcome: Progressing     Problem: Respiratory - Adult  Goal: Achieves optimal ventilation and oxygenation  3/27/2023 0408 by Marcelina Ball RN  Outcome: Progressing     Problem: Skin/Tissue Integrity  Goal: Absence of new skin breakdown  Description: 1. Monitor for areas of redness and/or skin breakdown  2. Assess vascular access sites hourly  3. Every 4-6 hours minimum:  Change oxygen saturation probe site  4. Every 4-6 hours:  If on nasal continuous positive airway pressure, respiratory therapy assess nares and determine need for appliance change or resting period.   Outcome: Progressing

## 2023-03-27 NOTE — PLAN OF CARE
Problem: Safety - Adult  Goal: Free from fall injury  Recent Flowsheet Documentation  Taken 3/27/2023 1600 by Tracie Hannah RN  Free From Fall Injury: Instruct family/caregiver on patient safety  3/27/2023 0408 by Emmie Parra RN  Outcome: Progressing     Problem: Discharge Planning  Goal: Discharge to home or other facility with appropriate resources  Recent Flowsheet Documentation  Taken 3/27/2023 0800 by Tracie Hannah RN  Discharge to home or other facility with appropriate resources: Identify barriers to discharge with patient and caregiver  3/27/2023 0408 by Emmie Parra RN  Outcome: Progressing     Problem: Pain  Goal: Verbalizes/displays adequate comfort level or baseline comfort level  3/27/2023 0408 by Emmie Parra RN  Outcome: Progressing     Problem: Respiratory - Adult  Goal: Achieves optimal ventilation and oxygenation  3/27/2023 0408 by Emmie Parra RN  Outcome: Progressing     Problem: Skin/Tissue Integrity  Goal: Absence of new skin breakdown  Description: 1. Monitor for areas of redness and/or skin breakdown  2. Assess vascular access sites hourly  3. Every 4-6 hours minimum:  Change oxygen saturation probe site  4. Every 4-6 hours:  If on nasal continuous positive airway pressure, respiratory therapy assess nares and determine need for appliance change or resting period.   3/27/2023 0408 by Emmie Parra RN  Outcome: Progressing

## 2023-03-28 ENCOUNTER — APPOINTMENT (OUTPATIENT)
Dept: MRI IMAGING | Age: 63
DRG: 432 | End: 2023-03-28
Attending: SURGERY
Payer: COMMERCIAL

## 2023-03-28 LAB
ABSOLUTE EOS #: 0 K/UL (ref 0–0.4)
ABSOLUTE IMMATURE GRANULOCYTE: 0.18 K/UL (ref 0–0.3)
ABSOLUTE LYMPH #: 0.53 K/UL (ref 1–4.8)
ABSOLUTE MONO #: 1.67 K/UL (ref 0.1–0.8)
ALBUMIN SERPL-MCNC: 2.5 G/DL (ref 3.5–5.2)
ALBUMIN/GLOBULIN RATIO: 1 (ref 1–2.5)
ALP SERPL-CCNC: 104 U/L (ref 40–129)
ALT SERPL-CCNC: 32 U/L (ref 5–41)
ANION GAP SERPL CALCULATED.3IONS-SCNC: 9 MMOL/L (ref 9–17)
AST SERPL-CCNC: 91 U/L
BASOPHILS # BLD: 0 % (ref 0–2)
BASOPHILS ABSOLUTE: 0 K/UL (ref 0–0.2)
BILIRUB DIRECT SERPL-MCNC: 0.9 MG/DL
BILIRUB INDIRECT SERPL-MCNC: 1.3 MG/DL (ref 0–1)
BILIRUB SERPL-MCNC: 2.2 MG/DL (ref 0.3–1.2)
BUN SERPL-MCNC: 12 MG/DL (ref 8–23)
CALCIUM SERPL-MCNC: 8.4 MG/DL (ref 8.6–10.4)
CHLORIDE SERPL-SCNC: 105 MMOL/L (ref 98–107)
CO2 SERPL-SCNC: 24 MMOL/L (ref 20–31)
CREAT SERPL-MCNC: 0.71 MG/DL (ref 0.7–1.2)
EOSINOPHILS RELATIVE PERCENT: 0 % (ref 1–4)
GFR SERPL CREATININE-BSD FRML MDRD: >60 ML/MIN/1.73M2
GLUCOSE SERPL-MCNC: 96 MG/DL (ref 70–99)
HCT VFR BLD AUTO: 29.2 % (ref 40.7–50.3)
HGB BLD-MCNC: 8.4 G/DL (ref 13–17)
IMMATURE GRANULOCYTES: 2 %
INR PPP: 1.3
LYMPHOCYTES # BLD: 6 % (ref 24–44)
MAGNESIUM SERPL-MCNC: 1.6 MG/DL (ref 1.6–2.6)
MCH RBC QN AUTO: 24.9 PG (ref 25.2–33.5)
MCHC RBC AUTO-ENTMCNC: 28.8 G/DL (ref 28.4–34.8)
MCV RBC AUTO: 86.4 FL (ref 82.6–102.9)
MONOCYTES # BLD: 19 % (ref 1–7)
MORPHOLOGY: ABNORMAL
NRBC AUTOMATED: 0 PER 100 WBC
PDW BLD-RTO: 20.2 % (ref 11.8–14.4)
PHOSPHATE SERPL-MCNC: 3.2 MG/DL (ref 2.5–4.5)
PLATELET # BLD AUTO: 182 K/UL (ref 138–453)
PMV BLD AUTO: 10.4 FL (ref 8.1–13.5)
POTASSIUM SERPL-SCNC: 3.6 MMOL/L (ref 3.7–5.3)
PROT SERPL-MCNC: 5.1 G/DL (ref 6.4–8.3)
PROTHROMBIN TIME: 16.2 SEC (ref 11.7–14.9)
RBC # BLD: 3.38 M/UL (ref 4.21–5.77)
SEG NEUTROPHILS: 73 % (ref 36–66)
SEGMENTED NEUTROPHILS ABSOLUTE COUNT: 6.42 K/UL (ref 1.8–7.7)
SODIUM SERPL-SCNC: 138 MMOL/L (ref 135–144)
WBC # BLD AUTO: 8.8 K/UL (ref 3.5–11.3)

## 2023-03-28 PROCEDURE — 6370000000 HC RX 637 (ALT 250 FOR IP): Performed by: STUDENT IN AN ORGANIZED HEALTH CARE EDUCATION/TRAINING PROGRAM

## 2023-03-28 PROCEDURE — 2580000003 HC RX 258: Performed by: STUDENT IN AN ORGANIZED HEALTH CARE EDUCATION/TRAINING PROGRAM

## 2023-03-28 PROCEDURE — 85025 COMPLETE CBC W/AUTO DIFF WBC: CPT

## 2023-03-28 PROCEDURE — C9113 INJ PANTOPRAZOLE SODIUM, VIA: HCPCS | Performed by: STUDENT IN AN ORGANIZED HEALTH CARE EDUCATION/TRAINING PROGRAM

## 2023-03-28 PROCEDURE — 80048 BASIC METABOLIC PNL TOTAL CA: CPT

## 2023-03-28 PROCEDURE — 97116 GAIT TRAINING THERAPY: CPT

## 2023-03-28 PROCEDURE — 6360000002 HC RX W HCPCS: Performed by: STUDENT IN AN ORGANIZED HEALTH CARE EDUCATION/TRAINING PROGRAM

## 2023-03-28 PROCEDURE — 83735 ASSAY OF MAGNESIUM: CPT

## 2023-03-28 PROCEDURE — 97130 THER IVNTJ EA ADDL 15 MIN: CPT

## 2023-03-28 PROCEDURE — 85610 PROTHROMBIN TIME: CPT

## 2023-03-28 PROCEDURE — 97530 THERAPEUTIC ACTIVITIES: CPT

## 2023-03-28 PROCEDURE — 36415 COLL VENOUS BLD VENIPUNCTURE: CPT

## 2023-03-28 PROCEDURE — 80076 HEPATIC FUNCTION PANEL: CPT

## 2023-03-28 PROCEDURE — 2060000000 HC ICU INTERMEDIATE R&B

## 2023-03-28 PROCEDURE — 84100 ASSAY OF PHOSPHORUS: CPT

## 2023-03-28 PROCEDURE — 97129 THER IVNTJ 1ST 15 MIN: CPT

## 2023-03-28 RX ADMIN — SODIUM CHLORIDE, PRESERVATIVE FREE 40 MG: 5 INJECTION INTRAVENOUS at 08:50

## 2023-03-28 RX ADMIN — OXYCODONE HYDROCHLORIDE 5 MG: 5 TABLET ORAL at 11:17

## 2023-03-28 RX ADMIN — FUROSEMIDE 40 MG: 40 TABLET ORAL at 16:44

## 2023-03-28 RX ADMIN — SPIRONOLACTONE 50 MG: 25 TABLET ORAL at 08:50

## 2023-03-28 RX ADMIN — PIPERACILLIN AND TAZOBACTAM 3375 MG: 3; .375 INJECTION, POWDER, LYOPHILIZED, FOR SOLUTION INTRAVENOUS at 01:34

## 2023-03-28 RX ADMIN — OXYCODONE HYDROCHLORIDE 5 MG: 5 TABLET ORAL at 21:26

## 2023-03-28 RX ADMIN — PIPERACILLIN AND TAZOBACTAM 3375 MG: 3; .375 INJECTION, POWDER, LYOPHILIZED, FOR SOLUTION INTRAVENOUS at 16:45

## 2023-03-28 RX ADMIN — ENOXAPARIN SODIUM 30 MG: 100 INJECTION SUBCUTANEOUS at 08:51

## 2023-03-28 RX ADMIN — FUROSEMIDE 40 MG: 40 TABLET ORAL at 08:50

## 2023-03-28 RX ADMIN — ENOXAPARIN SODIUM 30 MG: 100 INJECTION SUBCUTANEOUS at 21:19

## 2023-03-28 RX ADMIN — ATORVASTATIN CALCIUM 20 MG: 20 TABLET, FILM COATED ORAL at 21:19

## 2023-03-28 RX ADMIN — SODIUM CHLORIDE, PRESERVATIVE FREE 10 ML: 5 INJECTION INTRAVENOUS at 21:19

## 2023-03-28 RX ADMIN — LACTULOSE 20 G: 20 SOLUTION ORAL at 13:25

## 2023-03-28 RX ADMIN — PIPERACILLIN AND TAZOBACTAM 3375 MG: 3; .375 INJECTION, POWDER, LYOPHILIZED, FOR SOLUTION INTRAVENOUS at 10:17

## 2023-03-28 RX ADMIN — LACTULOSE 20 G: 20 SOLUTION ORAL at 21:19

## 2023-03-28 RX ADMIN — LACTULOSE 20 G: 20 SOLUTION ORAL at 08:51

## 2023-03-28 ASSESSMENT — PAIN SCALES - GENERAL
PAINLEVEL_OUTOF10: 8
PAINLEVEL_OUTOF10: 7

## 2023-03-28 ASSESSMENT — PAIN DESCRIPTION - ORIENTATION
ORIENTATION: MID
ORIENTATION: MID;LEFT

## 2023-03-28 ASSESSMENT — PAIN DESCRIPTION - DESCRIPTORS
DESCRIPTORS: DISCOMFORT
DESCRIPTORS: SHARP

## 2023-03-28 ASSESSMENT — PAIN DESCRIPTION - LOCATION
LOCATION: ABDOMEN
LOCATION: ABDOMEN

## 2023-03-28 NOTE — PLAN OF CARE
Problem: Safety - Adult  Goal: Free from fall injury  Outcome: Progressing     Problem: Discharge Planning  Goal: Discharge to home or other facility with appropriate resources  Outcome: Progressing  Flowsheets (Taken 3/28/2023 5129)  Discharge to home or other facility with appropriate resources:   Identify barriers to discharge with patient and caregiver   Arrange for needed discharge resources and transportation as appropriate   Identify discharge learning needs (meds, wound care, etc)   Refer to discharge planning if patient needs post-hospital services based on physician order or complex needs related to functional status, cognitive ability or social support system     Problem: Pain  Goal: Verbalizes/displays adequate comfort level or baseline comfort level  Outcome: Progressing     Problem: Respiratory - Adult  Goal: Achieves optimal ventilation and oxygenation  Outcome: Progressing     Problem: Skin/Tissue Integrity  Goal: Absence of new skin breakdown  Description: 1. Monitor for areas of redness and/or skin breakdown  2. Assess vascular access sites hourly  3. Every 4-6 hours minimum:  Change oxygen saturation probe site  4. Every 4-6 hours:  If on nasal continuous positive airway pressure, respiratory therapy assess nares and determine need for appliance change or resting period.   Outcome: Progressing     Problem: ABCDS Injury Assessment  Goal: Absence of physical injury  Outcome: Progressing

## 2023-03-28 NOTE — PLAN OF CARE
Problem: Safety - Adult  Goal: Free from fall injury  Outcome: Progressing  Flowsheets (Taken 3/27/2023 1600 by Kyra Flowers RN)  Free From Fall Injury: Instruct family/caregiver on patient safety     Problem: Discharge Planning  Goal: Discharge to home or other facility with appropriate resources  Outcome: Progressing     Problem: Pain  Goal: Verbalizes/displays adequate comfort level or baseline comfort level  Outcome: Progressing     Problem: Respiratory - Adult  Goal: Achieves optimal ventilation and oxygenation  Outcome: Progressing     Problem: Skin/Tissue Integrity  Goal: Absence of new skin breakdown  Description: 1. Monitor for areas of redness and/or skin breakdown  2. Assess vascular access sites hourly  3. Every 4-6 hours minimum:  Change oxygen saturation probe site  4. Every 4-6 hours:  If on nasal continuous positive airway pressure, respiratory therapy assess nares and determine need for appliance change or resting period.   Outcome: Progressing     Problem: ABCDS Injury Assessment  Goal: Absence of physical injury  Outcome: Progressing  Flowsheets (Taken 3/27/2023 1600 by Kyra Flowers RN)  Absence of Physical Injury: Implement safety measures based on patient assessment

## 2023-03-29 ENCOUNTER — APPOINTMENT (OUTPATIENT)
Dept: GENERAL RADIOLOGY | Age: 63
DRG: 432 | End: 2023-03-29
Attending: SURGERY
Payer: COMMERCIAL

## 2023-03-29 LAB
ABSOLUTE EOS #: 0 K/UL (ref 0–0.4)
ABSOLUTE IMMATURE GRANULOCYTE: 0 K/UL (ref 0–0.3)
ABSOLUTE LYMPH #: 0.71 K/UL (ref 1–4.8)
ABSOLUTE MONO #: 0.8 K/UL (ref 0.1–0.8)
ALBUMIN SERPL-MCNC: 2.3 G/DL (ref 3.5–5.2)
ALBUMIN/GLOBULIN RATIO: 0.9 (ref 1–2.5)
ALP SERPL-CCNC: 133 U/L (ref 40–129)
ALT SERPL-CCNC: 58 U/L (ref 5–41)
ANION GAP SERPL CALCULATED.3IONS-SCNC: 9 MMOL/L (ref 9–17)
AST SERPL-CCNC: 147 U/L
BASOPHILS # BLD: 0 % (ref 0–2)
BASOPHILS ABSOLUTE: 0 K/UL (ref 0–0.2)
BILIRUB DIRECT SERPL-MCNC: 0.8 MG/DL
BILIRUB INDIRECT SERPL-MCNC: 1 MG/DL (ref 0–1)
BILIRUB SERPL-MCNC: 1.8 MG/DL (ref 0.3–1.2)
BUN SERPL-MCNC: 11 MG/DL (ref 8–23)
CALCIUM SERPL-MCNC: 8.3 MG/DL (ref 8.6–10.4)
CHLORIDE SERPL-SCNC: 103 MMOL/L (ref 98–107)
CO2 SERPL-SCNC: 24 MMOL/L (ref 20–31)
CREAT SERPL-MCNC: 0.58 MG/DL (ref 0.7–1.2)
EOSINOPHILS RELATIVE PERCENT: 0 % (ref 1–4)
FOLATE SERPL-MCNC: 15.6 NG/ML
GFR SERPL CREATININE-BSD FRML MDRD: >60 ML/MIN/1.73M2
GLUCOSE SERPL-MCNC: 115 MG/DL (ref 70–99)
HCT VFR BLD AUTO: 28.6 % (ref 40.7–50.3)
HGB BLD-MCNC: 8.4 G/DL (ref 13–17)
IMMATURE GRANULOCYTES: 0 %
INR PPP: 1.6
LYMPHOCYTES # BLD: 8 % (ref 24–44)
MAGNESIUM SERPL-MCNC: 1.5 MG/DL (ref 1.6–2.6)
MCH RBC QN AUTO: 24.7 PG (ref 25.2–33.5)
MCHC RBC AUTO-ENTMCNC: 29.4 G/DL (ref 28.4–34.8)
MCV RBC AUTO: 84.1 FL (ref 82.6–102.9)
MONOCYTES # BLD: 9 % (ref 1–7)
MORPHOLOGY: ABNORMAL
MORPHOLOGY: ABNORMAL
NRBC AUTOMATED: 0 PER 100 WBC
PDW BLD-RTO: 20.3 % (ref 11.8–14.4)
PHOSPHATE SERPL-MCNC: 3.7 MG/DL (ref 2.5–4.5)
PLATELET # BLD AUTO: 200 K/UL (ref 138–453)
PMV BLD AUTO: 9.7 FL (ref 8.1–13.5)
POTASSIUM SERPL-SCNC: 3.3 MMOL/L (ref 3.7–5.3)
PROT SERPL-MCNC: 4.8 G/DL (ref 6.4–8.3)
PROTHROMBIN TIME: 18.7 SEC (ref 11.7–14.9)
RBC # BLD: 3.4 M/UL (ref 4.21–5.77)
SEG NEUTROPHILS: 83 % (ref 36–66)
SEGMENTED NEUTROPHILS ABSOLUTE COUNT: 7.39 K/UL (ref 1.8–7.7)
SODIUM SERPL-SCNC: 136 MMOL/L (ref 135–144)
SURGICAL PATHOLOGY REPORT: NORMAL
TROPONIN I SERPL DL<=0.01 NG/ML-MCNC: 22 NG/L (ref 0–22)
VIT B12 SERPL-MCNC: 1010 PG/ML (ref 232–1245)
WBC # BLD AUTO: 8.9 K/UL (ref 3.5–11.3)

## 2023-03-29 PROCEDURE — 93005 ELECTROCARDIOGRAM TRACING: CPT

## 2023-03-29 PROCEDURE — 36415 COLL VENOUS BLD VENIPUNCTURE: CPT

## 2023-03-29 PROCEDURE — 2580000003 HC RX 258: Performed by: STUDENT IN AN ORGANIZED HEALTH CARE EDUCATION/TRAINING PROGRAM

## 2023-03-29 PROCEDURE — 6360000002 HC RX W HCPCS: Performed by: STUDENT IN AN ORGANIZED HEALTH CARE EDUCATION/TRAINING PROGRAM

## 2023-03-29 PROCEDURE — A4216 STERILE WATER/SALINE, 10 ML: HCPCS | Performed by: STUDENT IN AN ORGANIZED HEALTH CARE EDUCATION/TRAINING PROGRAM

## 2023-03-29 PROCEDURE — 6370000000 HC RX 637 (ALT 250 FOR IP)

## 2023-03-29 PROCEDURE — 85610 PROTHROMBIN TIME: CPT

## 2023-03-29 PROCEDURE — 71045 X-RAY EXAM CHEST 1 VIEW: CPT

## 2023-03-29 PROCEDURE — 85025 COMPLETE CBC W/AUTO DIFF WBC: CPT

## 2023-03-29 PROCEDURE — 83735 ASSAY OF MAGNESIUM: CPT

## 2023-03-29 PROCEDURE — 2580000003 HC RX 258

## 2023-03-29 PROCEDURE — 2700000000 HC OXYGEN THERAPY PER DAY

## 2023-03-29 PROCEDURE — 6370000000 HC RX 637 (ALT 250 FOR IP): Performed by: STUDENT IN AN ORGANIZED HEALTH CARE EDUCATION/TRAINING PROGRAM

## 2023-03-29 PROCEDURE — 94761 N-INVAS EAR/PLS OXIMETRY MLT: CPT

## 2023-03-29 PROCEDURE — 84484 ASSAY OF TROPONIN QUANT: CPT

## 2023-03-29 PROCEDURE — 2060000000 HC ICU INTERMEDIATE R&B

## 2023-03-29 PROCEDURE — 84100 ASSAY OF PHOSPHORUS: CPT

## 2023-03-29 PROCEDURE — 80076 HEPATIC FUNCTION PANEL: CPT

## 2023-03-29 PROCEDURE — C9113 INJ PANTOPRAZOLE SODIUM, VIA: HCPCS | Performed by: STUDENT IN AN ORGANIZED HEALTH CARE EDUCATION/TRAINING PROGRAM

## 2023-03-29 PROCEDURE — 80048 BASIC METABOLIC PNL TOTAL CA: CPT

## 2023-03-29 RX ORDER — FAMOTIDINE 20 MG/1
20 TABLET, FILM COATED ORAL NIGHTLY PRN
Status: DISCONTINUED | OUTPATIENT
Start: 2023-03-29 | End: 2023-04-04 | Stop reason: HOSPADM

## 2023-03-29 RX ORDER — POTASSIUM CHLORIDE 7.45 MG/ML
10 INJECTION INTRAVENOUS
Status: DISPENSED | OUTPATIENT
Start: 2023-03-29 | End: 2023-03-29

## 2023-03-29 RX ORDER — CALCIUM CARBONATE 200(500)MG
500 TABLET,CHEWABLE ORAL 2 TIMES DAILY PRN
Status: DISCONTINUED | OUTPATIENT
Start: 2023-03-29 | End: 2023-04-04 | Stop reason: HOSPADM

## 2023-03-29 RX ORDER — SODIUM CHLORIDE, SODIUM LACTATE, POTASSIUM CHLORIDE, AND CALCIUM CHLORIDE .6; .31; .03; .02 G/100ML; G/100ML; G/100ML; G/100ML
1000 INJECTION, SOLUTION INTRAVENOUS ONCE
Status: COMPLETED | OUTPATIENT
Start: 2023-03-29 | End: 2023-03-29

## 2023-03-29 RX ORDER — POTASSIUM CHLORIDE 20 MEQ/1
40 TABLET, EXTENDED RELEASE ORAL 2 TIMES DAILY
Status: DISCONTINUED | OUTPATIENT
Start: 2023-03-29 | End: 2023-04-04 | Stop reason: HOSPADM

## 2023-03-29 RX ORDER — IPRATROPIUM BROMIDE AND ALBUTEROL SULFATE 2.5; .5 MG/3ML; MG/3ML
1 SOLUTION RESPIRATORY (INHALATION) EVERY 4 HOURS PRN
Status: DISCONTINUED | OUTPATIENT
Start: 2023-03-29 | End: 2023-04-04 | Stop reason: HOSPADM

## 2023-03-29 RX ORDER — UREA 10 %
3 LOTION (ML) TOPICAL NIGHTLY PRN
Status: DISCONTINUED | OUTPATIENT
Start: 2023-03-29 | End: 2023-04-04 | Stop reason: HOSPADM

## 2023-03-29 RX ORDER — CALCIUM CARBONATE 200(500)MG
500 TABLET,CHEWABLE ORAL ONCE
Status: COMPLETED | OUTPATIENT
Start: 2023-03-29 | End: 2023-03-29

## 2023-03-29 RX ORDER — SODIUM CHLORIDE, SODIUM LACTATE, POTASSIUM CHLORIDE, AND CALCIUM CHLORIDE .6; .31; .03; .02 G/100ML; G/100ML; G/100ML; G/100ML
1000 INJECTION, SOLUTION INTRAVENOUS ONCE
Status: COMPLETED | OUTPATIENT
Start: 2023-03-29 | End: 2023-03-30

## 2023-03-29 RX ADMIN — ANTACID TABLETS 500 MG: 500 TABLET, CHEWABLE ORAL at 07:44

## 2023-03-29 RX ADMIN — OXYCODONE HYDROCHLORIDE 5 MG: 5 TABLET ORAL at 21:40

## 2023-03-29 RX ADMIN — MAGNESIUM SULFATE HEPTAHYDRATE 3000 MG: 500 INJECTION, SOLUTION INTRAMUSCULAR; INTRAVENOUS at 11:41

## 2023-03-29 RX ADMIN — SODIUM CHLORIDE, POTASSIUM CHLORIDE, SODIUM LACTATE AND CALCIUM CHLORIDE 1000 ML: 600; 310; 30; 20 INJECTION, SOLUTION INTRAVENOUS at 22:37

## 2023-03-29 RX ADMIN — ENOXAPARIN SODIUM 30 MG: 100 INJECTION SUBCUTANEOUS at 07:38

## 2023-03-29 RX ADMIN — ATORVASTATIN CALCIUM 20 MG: 20 TABLET, FILM COATED ORAL at 21:31

## 2023-03-29 RX ADMIN — SODIUM CHLORIDE, PRESERVATIVE FREE 40 MG: 5 INJECTION INTRAVENOUS at 07:38

## 2023-03-29 RX ADMIN — ENOXAPARIN SODIUM 30 MG: 100 INJECTION SUBCUTANEOUS at 21:31

## 2023-03-29 RX ADMIN — FENTANYL CITRATE 50 MCG: 50 INJECTION, SOLUTION INTRAMUSCULAR; INTRAVENOUS at 10:50

## 2023-03-29 RX ADMIN — SODIUM CHLORIDE, POTASSIUM CHLORIDE, SODIUM LACTATE AND CALCIUM CHLORIDE 1000 ML: 600; 310; 30; 20 INJECTION, SOLUTION INTRAVENOUS at 15:32

## 2023-03-29 RX ADMIN — FUROSEMIDE 40 MG: 40 TABLET ORAL at 18:00

## 2023-03-29 RX ADMIN — POTASSIUM CHLORIDE 10 MEQ: 10 INJECTION, SOLUTION INTRAVENOUS at 14:17

## 2023-03-29 RX ADMIN — FENTANYL CITRATE 50 MCG: 50 INJECTION, SOLUTION INTRAMUSCULAR; INTRAVENOUS at 01:40

## 2023-03-29 RX ADMIN — POTASSIUM CHLORIDE 40 MEQ: 1500 TABLET, EXTENDED RELEASE ORAL at 21:31

## 2023-03-29 RX ADMIN — ONDANSETRON 4 MG: 2 INJECTION INTRAMUSCULAR; INTRAVENOUS at 07:45

## 2023-03-29 RX ADMIN — FENTANYL CITRATE 50 MCG: 50 INJECTION, SOLUTION INTRAMUSCULAR; INTRAVENOUS at 23:52

## 2023-03-29 RX ADMIN — OXYCODONE HYDROCHLORIDE 5 MG: 5 TABLET ORAL at 05:50

## 2023-03-29 RX ADMIN — SPIRONOLACTONE 50 MG: 25 TABLET ORAL at 07:38

## 2023-03-29 RX ADMIN — PIPERACILLIN AND TAZOBACTAM 3375 MG: 3; .375 INJECTION, POWDER, LYOPHILIZED, FOR SOLUTION INTRAVENOUS at 18:02

## 2023-03-29 RX ADMIN — PIPERACILLIN AND TAZOBACTAM 3375 MG: 3; .375 INJECTION, POWDER, LYOPHILIZED, FOR SOLUTION INTRAVENOUS at 01:30

## 2023-03-29 RX ADMIN — PIPERACILLIN AND TAZOBACTAM 3375 MG: 3; .375 INJECTION, POWDER, LYOPHILIZED, FOR SOLUTION INTRAVENOUS at 07:53

## 2023-03-29 RX ADMIN — LACTULOSE 20 G: 20 SOLUTION ORAL at 07:39

## 2023-03-29 RX ADMIN — ANTACID TABLETS 500 MG: 500 TABLET, CHEWABLE ORAL at 18:00

## 2023-03-29 RX ADMIN — FUROSEMIDE 40 MG: 40 TABLET ORAL at 07:38

## 2023-03-29 RX ADMIN — POTASSIUM CHLORIDE 40 MEQ: 1500 TABLET, EXTENDED RELEASE ORAL at 11:39

## 2023-03-29 RX ADMIN — FAMOTIDINE 20 MG: 20 TABLET, FILM COATED ORAL at 23:52

## 2023-03-29 RX ADMIN — DEXTROSE AND SODIUM CHLORIDE: 5; 450 INJECTION, SOLUTION INTRAVENOUS at 02:15

## 2023-03-29 ASSESSMENT — PAIN DESCRIPTION - LOCATION
LOCATION: ABDOMEN

## 2023-03-29 ASSESSMENT — PAIN DESCRIPTION - DESCRIPTORS
DESCRIPTORS: ACHING;DISCOMFORT
DESCRIPTORS: DISCOMFORT
DESCRIPTORS: ACHING
DESCRIPTORS: DISCOMFORT;TENDER

## 2023-03-29 ASSESSMENT — PAIN SCALES - GENERAL
PAINLEVEL_OUTOF10: 8
PAINLEVEL_OUTOF10: 9
PAINLEVEL_OUTOF10: 7
PAINLEVEL_OUTOF10: 9
PAINLEVEL_OUTOF10: 8

## 2023-03-29 ASSESSMENT — PAIN DESCRIPTION - ORIENTATION
ORIENTATION: MID

## 2023-03-29 NOTE — DISCHARGE SUMMARY
as: CHRONULAC  take 30 milliliters by mouth three times a day     pantoprazole 40 MG tablet  Commonly known as: PROTONIX     spironolactone 25 MG tablet  Commonly known as: ALDACTONE  Take 2 tablets by mouth daily     sucralfate 1 GM tablet  Commonly known as: CARAFATE  Take 1 tablet by mouth 4 times daily              No discharge procedures on file. Time Spent on discharge is   in patient examination, evaluation, counseling as well as medication reconciliation, prescriptions for required medications, discharge plan and follow up. Electronically signed by   Kalyan Ellis MD  3/29/2023  3:41 PM      Thank you VASU Noriega for the opportunity to be involved in this patient's care. Please note that this chart was generated using voice recognition Dragon dictation software. Although every effort was made to ensure the accuracy of this automated transcription, some errors in transcription may have occurred.

## 2023-03-29 NOTE — PLAN OF CARE
Problem: Safety - Adult  Goal: Free from fall injury  3/29/2023 1014 by Antolin Bauer RN  Outcome: Progressing  3/29/2023 0236 by Bouchra Banda RN  Outcome: Progressing     Problem: Discharge Planning  Goal: Discharge to home or other facility with appropriate resources  3/29/2023 1014 by Antolin Bauer RN  Outcome: Progressing  Flowsheets (Taken 3/29/2023 0800)  Discharge to home or other facility with appropriate resources: Identify barriers to discharge with patient and caregiver  3/29/2023 0236 by Bouchra Banda RN  Outcome: Progressing     Problem: Pain  Goal: Verbalizes/displays adequate comfort level or baseline comfort level  3/29/2023 1014 by Antolin Bauer RN  Outcome: Progressing  3/29/2023 0236 by Bouchra Banda RN  Outcome: Progressing     Problem: Respiratory - Adult  Goal: Achieves optimal ventilation and oxygenation  3/29/2023 1014 by Antolin Bauer RN  Outcome: Progressing  Flowsheets (Taken 3/29/2023 0800)  Achieves optimal ventilation and oxygenation: Assess for changes in respiratory status  3/29/2023 0236 by Bouchra Banda RN  Outcome: Progressing     Problem: Skin/Tissue Integrity  Goal: Absence of new skin breakdown  Description: 1. Monitor for areas of redness and/or skin breakdown  2. Assess vascular access sites hourly  3. Every 4-6 hours minimum:  Change oxygen saturation probe site  4. Every 4-6 hours:  If on nasal continuous positive airway pressure, respiratory therapy assess nares and determine need for appliance change or resting period.   3/29/2023 1014 by Antolin Bauer RN  Outcome: Progressing  3/29/2023 0236 by Bouchra Bnada RN  Outcome: Progressing     Problem: ABCDS Injury Assessment  Goal: Absence of physical injury  3/29/2023 1014 by Antolin Bauer RN  Outcome: Progressing  3/29/2023 0236 by Bouchra Banda RN  Outcome: Progressing

## 2023-03-30 ENCOUNTER — APPOINTMENT (OUTPATIENT)
Dept: CT IMAGING | Age: 63
DRG: 432 | End: 2023-03-30
Attending: SURGERY
Payer: COMMERCIAL

## 2023-03-30 LAB
ABSOLUTE EOS #: 0.17 K/UL (ref 0–0.44)
ABSOLUTE IMMATURE GRANULOCYTE: 0.34 K/UL (ref 0–0.3)
ABSOLUTE LYMPH #: 0.67 K/UL (ref 1.1–3.7)
ABSOLUTE MONO #: 1.51 K/UL (ref 0.1–1.2)
ALBUMIN SERPL-MCNC: 2.1 G/DL (ref 3.5–5.2)
ALBUMIN/GLOBULIN RATIO: 0.8 (ref 1–2.5)
ALP SERPL-CCNC: 119 U/L (ref 40–129)
ALT SERPL-CCNC: 38 U/L (ref 5–41)
ANION GAP SERPL CALCULATED.3IONS-SCNC: 9 MMOL/L (ref 9–17)
AST SERPL-CCNC: 58 U/L
BASOPHILS # BLD: 0 % (ref 0–2)
BASOPHILS ABSOLUTE: 0 K/UL (ref 0–0.2)
BILIRUB DIRECT SERPL-MCNC: 0.8 MG/DL
BILIRUB INDIRECT SERPL-MCNC: 1.1 MG/DL (ref 0–1)
BILIRUB SERPL-MCNC: 1.9 MG/DL (ref 0.3–1.2)
BUN SERPL-MCNC: 8 MG/DL (ref 8–23)
CALCIUM SERPL-MCNC: 8.1 MG/DL (ref 8.6–10.4)
CHLORIDE SERPL-SCNC: 100 MMOL/L (ref 98–107)
CO2 SERPL-SCNC: 23 MMOL/L (ref 20–31)
CREAT SERPL-MCNC: 0.58 MG/DL (ref 0.7–1.2)
EKG ATRIAL RATE: 131 BPM
EKG P-R INTERVAL: 176 MS
EKG Q-T INTERVAL: 296 MS
EKG QRS DURATION: 72 MS
EKG QTC CALCULATION (BAZETT): 437 MS
EKG R AXIS: 39 DEGREES
EKG T AXIS: -23 DEGREES
EKG VENTRICULAR RATE: 131 BPM
EOSINOPHILS RELATIVE PERCENT: 1 % (ref 1–4)
GFR SERPL CREATININE-BSD FRML MDRD: >60 ML/MIN/1.73M2
GLUCOSE SERPL-MCNC: 111 MG/DL (ref 70–99)
HCT VFR BLD AUTO: 29.8 % (ref 40.7–50.3)
HGB BLD-MCNC: 8.6 G/DL (ref 13–17)
IMMATURE GRANULOCYTES: 2 %
INR PPP: 1.8
LYMPHOCYTES # BLD: 4 % (ref 24–43)
MAGNESIUM SERPL-MCNC: 1.5 MG/DL (ref 1.6–2.6)
MCH RBC QN AUTO: 24.4 PG (ref 25.2–33.5)
MCHC RBC AUTO-ENTMCNC: 28.9 G/DL (ref 28.4–34.8)
MCV RBC AUTO: 84.4 FL (ref 82.6–102.9)
MONOCYTES # BLD: 9 % (ref 3–12)
MORPHOLOGY: ABNORMAL
MORPHOLOGY: ABNORMAL
NRBC AUTOMATED: 0 PER 100 WBC
PDW BLD-RTO: 20.8 % (ref 11.8–14.4)
PHOSPHATE SERPL-MCNC: 2.6 MG/DL (ref 2.5–4.5)
PLATELET # BLD AUTO: 178 K/UL (ref 138–453)
PMV BLD AUTO: 9.6 FL (ref 8.1–13.5)
POTASSIUM SERPL-SCNC: 3.9 MMOL/L (ref 3.7–5.3)
PROT SERPL-MCNC: 4.8 G/DL (ref 6.4–8.3)
PROTHROMBIN TIME: 20.6 SEC (ref 11.7–14.9)
RBC # BLD: 3.53 M/UL (ref 4.21–5.77)
SEG NEUTROPHILS: 84 % (ref 36–65)
SEGMENTED NEUTROPHILS ABSOLUTE COUNT: 14.11 K/UL (ref 1.5–8.1)
SODIUM SERPL-SCNC: 132 MMOL/L (ref 135–144)
WBC # BLD AUTO: 16.8 K/UL (ref 3.5–11.3)

## 2023-03-30 PROCEDURE — 2580000003 HC RX 258: Performed by: STUDENT IN AN ORGANIZED HEALTH CARE EDUCATION/TRAINING PROGRAM

## 2023-03-30 PROCEDURE — 6370000000 HC RX 637 (ALT 250 FOR IP): Performed by: STUDENT IN AN ORGANIZED HEALTH CARE EDUCATION/TRAINING PROGRAM

## 2023-03-30 PROCEDURE — 85610 PROTHROMBIN TIME: CPT

## 2023-03-30 PROCEDURE — 36415 COLL VENOUS BLD VENIPUNCTURE: CPT

## 2023-03-30 PROCEDURE — 6360000004 HC RX CONTRAST MEDICATION

## 2023-03-30 PROCEDURE — 6360000002 HC RX W HCPCS: Performed by: STUDENT IN AN ORGANIZED HEALTH CARE EDUCATION/TRAINING PROGRAM

## 2023-03-30 PROCEDURE — 84100 ASSAY OF PHOSPHORUS: CPT

## 2023-03-30 PROCEDURE — 80076 HEPATIC FUNCTION PANEL: CPT

## 2023-03-30 PROCEDURE — 80048 BASIC METABOLIC PNL TOTAL CA: CPT

## 2023-03-30 PROCEDURE — 6370000000 HC RX 637 (ALT 250 FOR IP)

## 2023-03-30 PROCEDURE — 71260 CT THORAX DX C+: CPT

## 2023-03-30 PROCEDURE — 6360000002 HC RX W HCPCS

## 2023-03-30 PROCEDURE — 83735 ASSAY OF MAGNESIUM: CPT

## 2023-03-30 PROCEDURE — 2060000000 HC ICU INTERMEDIATE R&B

## 2023-03-30 PROCEDURE — C9113 INJ PANTOPRAZOLE SODIUM, VIA: HCPCS | Performed by: STUDENT IN AN ORGANIZED HEALTH CARE EDUCATION/TRAINING PROGRAM

## 2023-03-30 PROCEDURE — 97129 THER IVNTJ 1ST 15 MIN: CPT

## 2023-03-30 PROCEDURE — 85025 COMPLETE CBC W/AUTO DIFF WBC: CPT

## 2023-03-30 PROCEDURE — 97130 THER IVNTJ EA ADDL 15 MIN: CPT

## 2023-03-30 RX ORDER — MAGNESIUM SULFATE HEPTAHYDRATE 40 MG/ML
4000 INJECTION, SOLUTION INTRAVENOUS ONCE
Status: COMPLETED | OUTPATIENT
Start: 2023-03-30 | End: 2023-03-31

## 2023-03-30 RX ADMIN — OXYCODONE HYDROCHLORIDE 5 MG: 5 TABLET ORAL at 07:47

## 2023-03-30 RX ADMIN — SPIRONOLACTONE 50 MG: 25 TABLET ORAL at 07:45

## 2023-03-30 RX ADMIN — POTASSIUM CHLORIDE 40 MEQ: 1500 TABLET, EXTENDED RELEASE ORAL at 07:46

## 2023-03-30 RX ADMIN — FUROSEMIDE 40 MG: 40 TABLET ORAL at 07:46

## 2023-03-30 RX ADMIN — OXYCODONE HYDROCHLORIDE 5 MG: 5 TABLET ORAL at 15:41

## 2023-03-30 RX ADMIN — FENTANYL CITRATE 50 MCG: 50 INJECTION, SOLUTION INTRAMUSCULAR; INTRAVENOUS at 03:00

## 2023-03-30 RX ADMIN — ENOXAPARIN SODIUM 30 MG: 100 INJECTION SUBCUTANEOUS at 20:07

## 2023-03-30 RX ADMIN — FUROSEMIDE 40 MG: 40 TABLET ORAL at 17:06

## 2023-03-30 RX ADMIN — ENOXAPARIN SODIUM 30 MG: 100 INJECTION SUBCUTANEOUS at 07:44

## 2023-03-30 RX ADMIN — ANTACID TABLETS 500 MG: 500 TABLET, CHEWABLE ORAL at 20:34

## 2023-03-30 RX ADMIN — POTASSIUM CHLORIDE 40 MEQ: 1500 TABLET, EXTENDED RELEASE ORAL at 20:06

## 2023-03-30 RX ADMIN — FENTANYL CITRATE 50 MCG: 50 INJECTION, SOLUTION INTRAMUSCULAR; INTRAVENOUS at 20:16

## 2023-03-30 RX ADMIN — MAGNESIUM SULFATE HEPTAHYDRATE 4000 MG: 40 INJECTION, SOLUTION INTRAVENOUS at 20:18

## 2023-03-30 RX ADMIN — IOPAMIDOL 75 ML: 755 INJECTION, SOLUTION INTRAVENOUS at 01:49

## 2023-03-30 RX ADMIN — SODIUM CHLORIDE, PRESERVATIVE FREE 40 MG: 5 INJECTION INTRAVENOUS at 07:47

## 2023-03-30 RX ADMIN — ONDANSETRON 4 MG: 2 INJECTION INTRAMUSCULAR; INTRAVENOUS at 07:47

## 2023-03-30 RX ADMIN — ATORVASTATIN CALCIUM 20 MG: 20 TABLET, FILM COATED ORAL at 20:06

## 2023-03-30 ASSESSMENT — PAIN DESCRIPTION - DESCRIPTORS: DESCRIPTORS: SORE;DISCOMFORT

## 2023-03-30 ASSESSMENT — PAIN DESCRIPTION - LOCATION
LOCATION: ABDOMEN

## 2023-03-30 ASSESSMENT — PAIN SCALES - GENERAL
PAINLEVEL_OUTOF10: 9
PAINLEVEL_OUTOF10: 7
PAINLEVEL_OUTOF10: 8
PAINLEVEL_OUTOF10: 9
PAINLEVEL_OUTOF10: 7

## 2023-03-30 ASSESSMENT — PAIN - FUNCTIONAL ASSESSMENT
PAIN_FUNCTIONAL_ASSESSMENT: ACTIVITIES ARE NOT PREVENTED
PAIN_FUNCTIONAL_ASSESSMENT: ACTIVITIES ARE NOT PREVENTED

## 2023-03-30 ASSESSMENT — PAIN DESCRIPTION - ORIENTATION: ORIENTATION: MID

## 2023-03-30 NOTE — PLAN OF CARE
Problem: Safety - Adult  Goal: Free from fall injury  3/29/2023 2059 by Pilo Fournier RN  Outcome: Progressing  3/29/2023 1014 by Violetta Owusu RN  Outcome: Progressing     Problem: Discharge Planning  Goal: Discharge to home or other facility with appropriate resources  3/29/2023 2059 by Pilo Fournier RN  Outcome: Progressing  3/29/2023 1014 by Violetta Owusu RN  Outcome: Progressing  Flowsheets (Taken 3/29/2023 0800)  Discharge to home or other facility with appropriate resources: Identify barriers to discharge with patient and caregiver     Problem: Pain  Goal: Verbalizes/displays adequate comfort level or baseline comfort level  3/29/2023 2059 by Pilo Fournier RN  Outcome: Progressing  3/29/2023 1014 by Violetta Owusu RN  Outcome: Progressing     Problem: Respiratory - Adult  Goal: Achieves optimal ventilation and oxygenation  3/29/2023 2059 by Pilo Fournier RN  Outcome: Progressing  3/29/2023 1014 by Violetta Owusu RN  Outcome: Progressing  Flowsheets (Taken 3/29/2023 0800)  Achieves optimal ventilation and oxygenation: Assess for changes in respiratory status     Problem: Skin/Tissue Integrity  Goal: Absence of new skin breakdown  Description: 1. Monitor for areas of redness and/or skin breakdown  2. Assess vascular access sites hourly  3. Every 4-6 hours minimum:  Change oxygen saturation probe site  4. Every 4-6 hours:  If on nasal continuous positive airway pressure, respiratory therapy assess nares and determine need for appliance change or resting period.   3/29/2023 2059 by Pilo Fournier RN  Outcome: Progressing  3/29/2023 1014 by Violetta Owusu RN  Outcome: Progressing     Problem: ABCDS Injury Assessment  Goal: Absence of physical injury  3/29/2023 2059 by Pilo Fournier RN  Outcome: Progressing  3/29/2023 1014 by Violetta Owusu RN  Outcome: Progressing

## 2023-03-31 ENCOUNTER — APPOINTMENT (OUTPATIENT)
Dept: GENERAL RADIOLOGY | Age: 63
DRG: 432 | End: 2023-03-31
Attending: SURGERY
Payer: COMMERCIAL

## 2023-03-31 LAB
ABSOLUTE EOS #: 0.18 K/UL (ref 0–0.4)
ABSOLUTE IMMATURE GRANULOCYTE: 0.18 K/UL (ref 0–0.3)
ABSOLUTE LYMPH #: 0.54 K/UL (ref 1–4.8)
ABSOLUTE MONO #: 1.25 K/UL (ref 0.1–0.8)
ALBUMIN SERPL-MCNC: 2.1 G/DL (ref 3.5–5.2)
ALBUMIN/GLOBULIN RATIO: 0.7 (ref 1–2.5)
ALP SERPL-CCNC: 150 U/L (ref 40–129)
ALT SERPL-CCNC: 30 U/L (ref 5–41)
ANION GAP SERPL CALCULATED.3IONS-SCNC: 6 MMOL/L (ref 9–17)
AST SERPL-CCNC: 42 U/L
BASOPHILS # BLD: 0 % (ref 0–2)
BASOPHILS ABSOLUTE: 0 K/UL (ref 0–0.2)
BILIRUB DIRECT SERPL-MCNC: 0.7 MG/DL
BILIRUB INDIRECT SERPL-MCNC: 1 MG/DL (ref 0–1)
BILIRUB SERPL-MCNC: 1.7 MG/DL (ref 0.3–1.2)
BUN SERPL-MCNC: 9 MG/DL (ref 8–23)
CALCIUM SERPL-MCNC: 8.2 MG/DL (ref 8.6–10.4)
CHLORIDE SERPL-SCNC: 99 MMOL/L (ref 98–107)
CO2 SERPL-SCNC: 25 MMOL/L (ref 20–31)
CREAT SERPL-MCNC: 0.59 MG/DL (ref 0.7–1.2)
EOSINOPHILS RELATIVE PERCENT: 1 % (ref 1–4)
GFR SERPL CREATININE-BSD FRML MDRD: >60 ML/MIN/1.73M2
GLUCOSE SERPL-MCNC: 129 MG/DL (ref 70–99)
HCT VFR BLD AUTO: 29.4 % (ref 40.7–50.3)
HGB BLD-MCNC: 8.5 G/DL (ref 13–17)
IMMATURE GRANULOCYTES: 1 %
INR PPP: 1.6
LYMPHOCYTES # BLD: 3 % (ref 24–44)
MAGNESIUM SERPL-MCNC: 2 MG/DL (ref 1.6–2.6)
MCH RBC QN AUTO: 24.8 PG (ref 25.2–33.5)
MCHC RBC AUTO-ENTMCNC: 28.9 G/DL (ref 28.4–34.8)
MCV RBC AUTO: 85.7 FL (ref 82.6–102.9)
MONOCYTES # BLD: 7 % (ref 1–7)
MORPHOLOGY: ABNORMAL
MORPHOLOGY: ABNORMAL
NRBC AUTOMATED: 0 PER 100 WBC
PDW BLD-RTO: 20.7 % (ref 11.8–14.4)
PHOSPHATE SERPL-MCNC: 2.7 MG/DL (ref 2.5–4.5)
PLATELET # BLD AUTO: 210 K/UL (ref 138–453)
PMV BLD AUTO: 9.8 FL (ref 8.1–13.5)
POTASSIUM SERPL-SCNC: 4.7 MMOL/L (ref 3.7–5.3)
PROT SERPL-MCNC: 5 G/DL (ref 6.4–8.3)
PROTHROMBIN TIME: 19 SEC (ref 11.7–14.9)
RBC # BLD: 3.43 M/UL (ref 4.21–5.77)
SEG NEUTROPHILS: 88 % (ref 36–66)
SEGMENTED NEUTROPHILS ABSOLUTE COUNT: 15.75 K/UL (ref 1.8–7.7)
SODIUM SERPL-SCNC: 130 MMOL/L (ref 135–144)
WBC # BLD AUTO: 17.9 K/UL (ref 3.5–11.3)

## 2023-03-31 PROCEDURE — 2060000000 HC ICU INTERMEDIATE R&B

## 2023-03-31 PROCEDURE — 6370000000 HC RX 637 (ALT 250 FOR IP): Performed by: STUDENT IN AN ORGANIZED HEALTH CARE EDUCATION/TRAINING PROGRAM

## 2023-03-31 PROCEDURE — 6360000002 HC RX W HCPCS: Performed by: STUDENT IN AN ORGANIZED HEALTH CARE EDUCATION/TRAINING PROGRAM

## 2023-03-31 PROCEDURE — 6370000000 HC RX 637 (ALT 250 FOR IP)

## 2023-03-31 PROCEDURE — 36415 COLL VENOUS BLD VENIPUNCTURE: CPT

## 2023-03-31 PROCEDURE — 85025 COMPLETE CBC W/AUTO DIFF WBC: CPT

## 2023-03-31 PROCEDURE — 71045 X-RAY EXAM CHEST 1 VIEW: CPT

## 2023-03-31 PROCEDURE — 97530 THERAPEUTIC ACTIVITIES: CPT

## 2023-03-31 PROCEDURE — 97535 SELF CARE MNGMENT TRAINING: CPT

## 2023-03-31 PROCEDURE — 85610 PROTHROMBIN TIME: CPT

## 2023-03-31 PROCEDURE — 2580000003 HC RX 258: Performed by: STUDENT IN AN ORGANIZED HEALTH CARE EDUCATION/TRAINING PROGRAM

## 2023-03-31 PROCEDURE — C9113 INJ PANTOPRAZOLE SODIUM, VIA: HCPCS | Performed by: STUDENT IN AN ORGANIZED HEALTH CARE EDUCATION/TRAINING PROGRAM

## 2023-03-31 PROCEDURE — 80076 HEPATIC FUNCTION PANEL: CPT

## 2023-03-31 PROCEDURE — 84100 ASSAY OF PHOSPHORUS: CPT

## 2023-03-31 PROCEDURE — 80048 BASIC METABOLIC PNL TOTAL CA: CPT

## 2023-03-31 PROCEDURE — 83735 ASSAY OF MAGNESIUM: CPT

## 2023-03-31 RX ORDER — SUCRALFATE 1 G/1
1 TABLET ORAL
Status: DISCONTINUED | OUTPATIENT
Start: 2023-03-31 | End: 2023-04-04 | Stop reason: HOSPADM

## 2023-03-31 RX ADMIN — POTASSIUM CHLORIDE 40 MEQ: 1500 TABLET, EXTENDED RELEASE ORAL at 20:04

## 2023-03-31 RX ADMIN — FENTANYL CITRATE 50 MCG: 50 INJECTION, SOLUTION INTRAMUSCULAR; INTRAVENOUS at 23:00

## 2023-03-31 RX ADMIN — SPIRONOLACTONE 50 MG: 25 TABLET ORAL at 09:04

## 2023-03-31 RX ADMIN — SUCRALFATE 1 G: 1 TABLET ORAL at 12:23

## 2023-03-31 RX ADMIN — ANTACID TABLETS 500 MG: 500 TABLET, CHEWABLE ORAL at 12:23

## 2023-03-31 RX ADMIN — OXYCODONE HYDROCHLORIDE 5 MG: 5 TABLET ORAL at 06:13

## 2023-03-31 RX ADMIN — OXYCODONE HYDROCHLORIDE 5 MG: 5 TABLET ORAL at 23:32

## 2023-03-31 RX ADMIN — FUROSEMIDE 40 MG: 40 TABLET ORAL at 09:04

## 2023-03-31 RX ADMIN — FUROSEMIDE 40 MG: 40 TABLET ORAL at 17:48

## 2023-03-31 RX ADMIN — SODIUM CHLORIDE, PRESERVATIVE FREE 40 MG: 5 INJECTION INTRAVENOUS at 09:04

## 2023-03-31 RX ADMIN — SUCRALFATE 1 G: 1 TABLET ORAL at 09:22

## 2023-03-31 RX ADMIN — ATORVASTATIN CALCIUM 20 MG: 20 TABLET, FILM COATED ORAL at 20:04

## 2023-03-31 RX ADMIN — POTASSIUM CHLORIDE 40 MEQ: 1500 TABLET, EXTENDED RELEASE ORAL at 09:04

## 2023-03-31 RX ADMIN — ENOXAPARIN SODIUM 30 MG: 100 INJECTION SUBCUTANEOUS at 09:04

## 2023-03-31 RX ADMIN — FENTANYL CITRATE 50 MCG: 50 INJECTION, SOLUTION INTRAMUSCULAR; INTRAVENOUS at 19:08

## 2023-03-31 RX ADMIN — SUCRALFATE 1 G: 1 TABLET ORAL at 16:19

## 2023-03-31 RX ADMIN — OXYCODONE HYDROCHLORIDE 5 MG: 5 TABLET ORAL at 17:45

## 2023-03-31 RX ADMIN — ONDANSETRON 4 MG: 2 INJECTION INTRAMUSCULAR; INTRAVENOUS at 11:48

## 2023-03-31 RX ADMIN — OXYCODONE HYDROCHLORIDE 5 MG: 5 TABLET ORAL at 12:23

## 2023-03-31 RX ADMIN — FENTANYL CITRATE 50 MCG: 50 INJECTION, SOLUTION INTRAMUSCULAR; INTRAVENOUS at 09:22

## 2023-03-31 ASSESSMENT — PAIN DESCRIPTION - ORIENTATION: ORIENTATION: MID

## 2023-03-31 ASSESSMENT — PAIN DESCRIPTION - LOCATION
LOCATION: ABDOMEN

## 2023-03-31 ASSESSMENT — PAIN SCALES - GENERAL
PAINLEVEL_OUTOF10: 10
PAINLEVEL_OUTOF10: 4
PAINLEVEL_OUTOF10: 8
PAINLEVEL_OUTOF10: 10
PAINLEVEL_OUTOF10: 8
PAINLEVEL_OUTOF10: 6
PAINLEVEL_OUTOF10: 10
PAINLEVEL_OUTOF10: 4

## 2023-03-31 ASSESSMENT — PAIN DESCRIPTION - PAIN TYPE: TYPE: SURGICAL PAIN

## 2023-04-01 ENCOUNTER — APPOINTMENT (OUTPATIENT)
Dept: CT IMAGING | Age: 63
DRG: 432 | End: 2023-04-01
Attending: SURGERY
Payer: COMMERCIAL

## 2023-04-01 LAB
ABSOLUTE EOS #: 0.19 K/UL (ref 0–0.44)
ABSOLUTE IMMATURE GRANULOCYTE: 0.19 K/UL (ref 0–0.3)
ABSOLUTE LYMPH #: 0.76 K/UL (ref 1.1–3.7)
ABSOLUTE MONO #: 1.71 K/UL (ref 0.1–1.2)
ALBUMIN SERPL-MCNC: 1.9 G/DL (ref 3.5–5.2)
ALBUMIN/GLOBULIN RATIO: 0.7 (ref 1–2.5)
ALP SERPL-CCNC: 131 U/L (ref 40–129)
ALT SERPL-CCNC: 24 U/L (ref 5–41)
AMMONIA PLAS-SCNC: 44 UMOL/L (ref 16–60)
ANION GAP SERPL CALCULATED.3IONS-SCNC: 7 MMOL/L (ref 9–17)
AST SERPL-CCNC: 42 U/L
BASOPHILS # BLD: 0 % (ref 0–2)
BASOPHILS ABSOLUTE: 0 K/UL (ref 0–0.2)
BILIRUB DIRECT SERPL-MCNC: 0.7 MG/DL
BILIRUB INDIRECT SERPL-MCNC: 1.1 MG/DL (ref 0–1)
BILIRUB SERPL-MCNC: 1.8 MG/DL (ref 0.3–1.2)
BILIRUBIN URINE: NEGATIVE
BUN SERPL-MCNC: 9 MG/DL (ref 8–23)
CALCIUM SERPL-MCNC: 8.3 MG/DL (ref 8.6–10.4)
CHLORIDE SERPL-SCNC: 99 MMOL/L (ref 98–107)
CO2 SERPL-SCNC: 23 MMOL/L (ref 20–31)
COLOR: YELLOW
CREAT SERPL-MCNC: 0.64 MG/DL (ref 0.7–1.2)
EOSINOPHILS RELATIVE PERCENT: 1 % (ref 1–4)
EPITHELIAL CELLS UA: NORMAL /HPF (ref 0–5)
GFR SERPL CREATININE-BSD FRML MDRD: >60 ML/MIN/1.73M2
GLUCOSE SERPL-MCNC: 98 MG/DL (ref 70–99)
GLUCOSE UR STRIP.AUTO-MCNC: NEGATIVE MG/DL
HCT VFR BLD AUTO: 29.3 % (ref 40.7–50.3)
HGB BLD-MCNC: 8.5 G/DL (ref 13–17)
IMMATURE GRANULOCYTES: 1 %
INR PPP: 1.6
KETONES UR STRIP.AUTO-MCNC: NEGATIVE MG/DL
LEUKOCYTE ESTERASE UR QL STRIP.AUTO: NEGATIVE
LYMPHOCYTES # BLD: 4 % (ref 24–43)
MAGNESIUM SERPL-MCNC: 1.5 MG/DL (ref 1.6–2.6)
MCH RBC QN AUTO: 24.6 PG (ref 25.2–33.5)
MCHC RBC AUTO-ENTMCNC: 29 G/DL (ref 28.4–34.8)
MCV RBC AUTO: 84.7 FL (ref 82.6–102.9)
MONOCYTES # BLD: 9 % (ref 3–12)
MORPHOLOGY: ABNORMAL
MORPHOLOGY: ABNORMAL
NITRITE UR QL STRIP.AUTO: NEGATIVE
NRBC AUTOMATED: 0 PER 100 WBC
PDW BLD-RTO: 20.7 % (ref 11.8–14.4)
PHOSPHATE SERPL-MCNC: 3.3 MG/DL (ref 2.5–4.5)
PLATELET # BLD AUTO: 200 K/UL (ref 138–453)
PMV BLD AUTO: 9.1 FL (ref 8.1–13.5)
POTASSIUM SERPL-SCNC: 5.1 MMOL/L (ref 3.7–5.3)
PROT SERPL-MCNC: 4.6 G/DL (ref 6.4–8.3)
PROT UR STRIP.AUTO-MCNC: 7.5 MG/DL (ref 5–8)
PROT UR STRIP.AUTO-MCNC: NEGATIVE MG/DL
PROTHROMBIN TIME: 18.8 SEC (ref 11.7–14.9)
RBC # BLD: 3.46 M/UL (ref 4.21–5.77)
RBC CLUMPS #/AREA URNS AUTO: NORMAL /HPF (ref 0–4)
SEG NEUTROPHILS: 85 % (ref 36–65)
SEGMENTED NEUTROPHILS ABSOLUTE COUNT: 16.15 K/UL (ref 1.5–8.1)
SODIUM SERPL-SCNC: 129 MMOL/L (ref 135–144)
SPECIFIC GRAVITY UA: 1.03 (ref 1–1.03)
TURBIDITY: CLEAR
URINE HGB: ABNORMAL
UROBILINOGEN, URINE: NORMAL
WBC # BLD AUTO: 19 K/UL (ref 3.5–11.3)
WBC UA: NORMAL /HPF (ref 0–5)

## 2023-04-01 PROCEDURE — C9113 INJ PANTOPRAZOLE SODIUM, VIA: HCPCS | Performed by: STUDENT IN AN ORGANIZED HEALTH CARE EDUCATION/TRAINING PROGRAM

## 2023-04-01 PROCEDURE — 2580000003 HC RX 258: Performed by: STUDENT IN AN ORGANIZED HEALTH CARE EDUCATION/TRAINING PROGRAM

## 2023-04-01 PROCEDURE — 83735 ASSAY OF MAGNESIUM: CPT

## 2023-04-01 PROCEDURE — 85025 COMPLETE CBC W/AUTO DIFF WBC: CPT

## 2023-04-01 PROCEDURE — 2060000000 HC ICU INTERMEDIATE R&B

## 2023-04-01 PROCEDURE — 6360000002 HC RX W HCPCS: Performed by: STUDENT IN AN ORGANIZED HEALTH CARE EDUCATION/TRAINING PROGRAM

## 2023-04-01 PROCEDURE — 6360000002 HC RX W HCPCS

## 2023-04-01 PROCEDURE — 74177 CT ABD & PELVIS W/CONTRAST: CPT

## 2023-04-01 PROCEDURE — 2580000003 HC RX 258

## 2023-04-01 PROCEDURE — 6370000000 HC RX 637 (ALT 250 FOR IP): Performed by: STUDENT IN AN ORGANIZED HEALTH CARE EDUCATION/TRAINING PROGRAM

## 2023-04-01 PROCEDURE — 85610 PROTHROMBIN TIME: CPT

## 2023-04-01 PROCEDURE — 80048 BASIC METABOLIC PNL TOTAL CA: CPT

## 2023-04-01 PROCEDURE — 84100 ASSAY OF PHOSPHORUS: CPT

## 2023-04-01 PROCEDURE — 6360000004 HC RX CONTRAST MEDICATION

## 2023-04-01 PROCEDURE — 36415 COLL VENOUS BLD VENIPUNCTURE: CPT

## 2023-04-01 PROCEDURE — 82140 ASSAY OF AMMONIA: CPT

## 2023-04-01 PROCEDURE — 81001 URINALYSIS AUTO W/SCOPE: CPT

## 2023-04-01 PROCEDURE — 6370000000 HC RX 637 (ALT 250 FOR IP)

## 2023-04-01 PROCEDURE — 80076 HEPATIC FUNCTION PANEL: CPT

## 2023-04-01 RX ORDER — MAGNESIUM SULFATE IN WATER 40 MG/ML
2000 INJECTION, SOLUTION INTRAVENOUS ONCE
Status: COMPLETED | OUTPATIENT
Start: 2023-04-01 | End: 2023-04-01

## 2023-04-01 RX ADMIN — FUROSEMIDE 40 MG: 40 TABLET ORAL at 15:58

## 2023-04-01 RX ADMIN — SUCRALFATE 1 G: 1 TABLET ORAL at 09:46

## 2023-04-01 RX ADMIN — ENOXAPARIN SODIUM 30 MG: 100 INJECTION SUBCUTANEOUS at 19:56

## 2023-04-01 RX ADMIN — ONDANSETRON 4 MG: 2 INJECTION INTRAMUSCULAR; INTRAVENOUS at 12:56

## 2023-04-01 RX ADMIN — FUROSEMIDE 40 MG: 40 TABLET ORAL at 09:49

## 2023-04-01 RX ADMIN — POTASSIUM CHLORIDE 40 MEQ: 1500 TABLET, EXTENDED RELEASE ORAL at 09:46

## 2023-04-01 RX ADMIN — SPIRONOLACTONE 50 MG: 25 TABLET ORAL at 09:46

## 2023-04-01 RX ADMIN — OXYCODONE HYDROCHLORIDE 5 MG: 5 TABLET ORAL at 15:58

## 2023-04-01 RX ADMIN — SODIUM CHLORIDE, PRESERVATIVE FREE 40 MG: 5 INJECTION INTRAVENOUS at 09:47

## 2023-04-01 RX ADMIN — SUCRALFATE 1 G: 1 TABLET ORAL at 11:38

## 2023-04-01 RX ADMIN — IOPAMIDOL 75 ML: 755 INJECTION, SOLUTION INTRAVENOUS at 10:15

## 2023-04-01 RX ADMIN — FENTANYL CITRATE 50 MCG: 50 INJECTION, SOLUTION INTRAMUSCULAR; INTRAVENOUS at 20:01

## 2023-04-01 RX ADMIN — ATORVASTATIN CALCIUM 20 MG: 20 TABLET, FILM COATED ORAL at 19:56

## 2023-04-01 RX ADMIN — SODIUM CHLORIDE: 9 INJECTION, SOLUTION INTRAVENOUS at 09:44

## 2023-04-01 RX ADMIN — MAGNESIUM SULFATE HEPTAHYDRATE 2000 MG: 40 INJECTION, SOLUTION INTRAVENOUS at 09:45

## 2023-04-01 RX ADMIN — POTASSIUM CHLORIDE 40 MEQ: 1500 TABLET, EXTENDED RELEASE ORAL at 19:56

## 2023-04-01 RX ADMIN — ENOXAPARIN SODIUM 30 MG: 100 INJECTION SUBCUTANEOUS at 09:47

## 2023-04-01 RX ADMIN — SUCRALFATE 1 G: 1 TABLET ORAL at 15:58

## 2023-04-01 ASSESSMENT — PAIN SCALES - GENERAL
PAINLEVEL_OUTOF10: 9
PAINLEVEL_OUTOF10: 8
PAINLEVEL_OUTOF10: 9
PAINLEVEL_OUTOF10: 9

## 2023-04-01 ASSESSMENT — PAIN DESCRIPTION - LOCATION
LOCATION: ABDOMEN
LOCATION: ABDOMEN

## 2023-04-01 ASSESSMENT — PAIN DESCRIPTION - DESCRIPTORS
DESCRIPTORS: SHARP
DESCRIPTORS: SHARP

## 2023-04-01 ASSESSMENT — PAIN DESCRIPTION - ORIENTATION: ORIENTATION: MID

## 2023-04-01 NOTE — PLAN OF CARE
Problem: Safety - Adult  Goal: Free from fall injury  Outcome: Progressing     Problem: Discharge Planning  Goal: Discharge to home or other facility with appropriate resources  Outcome: Progressing     Problem: Pain  Goal: Verbalizes/displays adequate comfort level or baseline comfort level  Outcome: Progressing     Problem: Respiratory - Adult  Goal: Achieves optimal ventilation and oxygenation  Outcome: Progressing     Problem: Skin/Tissue Integrity  Goal: Absence of new skin breakdown  Description: 1. Monitor for areas of redness and/or skin breakdown  2. Assess vascular access sites hourly  3. Every 4-6 hours minimum:  Change oxygen saturation probe site  4. Every 4-6 hours:  If on nasal continuous positive airway pressure, respiratory therapy assess nares and determine need for appliance change or resting period.   Outcome: Progressing

## 2023-04-02 LAB
ABSOLUTE EOS #: 0 K/UL (ref 0–0.4)
ABSOLUTE IMMATURE GRANULOCYTE: 0.15 K/UL (ref 0–0.3)
ABSOLUTE LYMPH #: 0.3 K/UL (ref 1–4.8)
ABSOLUTE MONO #: 1.52 K/UL (ref 0.1–0.8)
ANION GAP SERPL CALCULATED.3IONS-SCNC: 9 MMOL/L (ref 9–17)
BASOPHILS # BLD: 0 % (ref 0–2)
BASOPHILS ABSOLUTE: 0 K/UL (ref 0–0.2)
BUN SERPL-MCNC: 11 MG/DL (ref 8–23)
CALCIUM SERPL-MCNC: 8.5 MG/DL (ref 8.6–10.4)
CHLORIDE SERPL-SCNC: 95 MMOL/L (ref 98–107)
CO2 SERPL-SCNC: 24 MMOL/L (ref 20–31)
CREAT SERPL-MCNC: 0.77 MG/DL (ref 0.7–1.2)
EOSINOPHILS RELATIVE PERCENT: 0 % (ref 1–4)
GFR SERPL CREATININE-BSD FRML MDRD: >60 ML/MIN/1.73M2
GLUCOSE SERPL-MCNC: 89 MG/DL (ref 70–99)
HCT VFR BLD AUTO: 30.4 % (ref 40.7–50.3)
HGB BLD-MCNC: 9.2 G/DL (ref 13–17)
IMMATURE GRANULOCYTES: 1 %
LYMPHOCYTES # BLD: 2 % (ref 24–44)
MCH RBC QN AUTO: 24.6 PG (ref 25.2–33.5)
MCHC RBC AUTO-ENTMCNC: 30.3 G/DL (ref 28.4–34.8)
MCV RBC AUTO: 81.3 FL (ref 82.6–102.9)
MONOCYTES # BLD: 10 % (ref 1–7)
MORPHOLOGY: ABNORMAL
NRBC AUTOMATED: 0 PER 100 WBC
PDW BLD-RTO: 21 % (ref 11.8–14.4)
PLATELET # BLD AUTO: 236 K/UL (ref 138–453)
PMV BLD AUTO: 9.1 FL (ref 8.1–13.5)
POTASSIUM SERPL-SCNC: 5.1 MMOL/L (ref 3.7–5.3)
RBC # BLD: 3.74 M/UL (ref 4.21–5.77)
SEG NEUTROPHILS: 87 % (ref 36–66)
SEGMENTED NEUTROPHILS ABSOLUTE COUNT: 13.23 K/UL (ref 1.8–7.7)
SODIUM SERPL-SCNC: 128 MMOL/L (ref 135–144)
WBC # BLD AUTO: 15.2 K/UL (ref 3.5–11.3)

## 2023-04-02 PROCEDURE — 6370000000 HC RX 637 (ALT 250 FOR IP): Performed by: STUDENT IN AN ORGANIZED HEALTH CARE EDUCATION/TRAINING PROGRAM

## 2023-04-02 PROCEDURE — 85025 COMPLETE CBC W/AUTO DIFF WBC: CPT

## 2023-04-02 PROCEDURE — 6360000002 HC RX W HCPCS: Performed by: STUDENT IN AN ORGANIZED HEALTH CARE EDUCATION/TRAINING PROGRAM

## 2023-04-02 PROCEDURE — 6370000000 HC RX 637 (ALT 250 FOR IP)

## 2023-04-02 PROCEDURE — 36415 COLL VENOUS BLD VENIPUNCTURE: CPT

## 2023-04-02 PROCEDURE — C9113 INJ PANTOPRAZOLE SODIUM, VIA: HCPCS | Performed by: STUDENT IN AN ORGANIZED HEALTH CARE EDUCATION/TRAINING PROGRAM

## 2023-04-02 PROCEDURE — 2060000000 HC ICU INTERMEDIATE R&B

## 2023-04-02 PROCEDURE — 2580000003 HC RX 258: Performed by: STUDENT IN AN ORGANIZED HEALTH CARE EDUCATION/TRAINING PROGRAM

## 2023-04-02 PROCEDURE — 80048 BASIC METABOLIC PNL TOTAL CA: CPT

## 2023-04-02 RX ORDER — DRONABINOL 2.5 MG/1
2.5 CAPSULE ORAL 2 TIMES DAILY
Status: DISCONTINUED | OUTPATIENT
Start: 2023-04-02 | End: 2023-04-04 | Stop reason: HOSPADM

## 2023-04-02 RX ADMIN — ENOXAPARIN SODIUM 30 MG: 100 INJECTION SUBCUTANEOUS at 19:46

## 2023-04-02 RX ADMIN — DRONABINOL 2.5 MG: 2.5 CAPSULE ORAL at 19:47

## 2023-04-02 RX ADMIN — FENTANYL CITRATE 50 MCG: 50 INJECTION, SOLUTION INTRAMUSCULAR; INTRAVENOUS at 02:00

## 2023-04-02 RX ADMIN — OXYCODONE HYDROCHLORIDE 5 MG: 5 TABLET ORAL at 00:53

## 2023-04-02 RX ADMIN — SODIUM CHLORIDE, PRESERVATIVE FREE 40 MG: 5 INJECTION INTRAVENOUS at 09:05

## 2023-04-02 RX ADMIN — SUCRALFATE 1 G: 1 TABLET ORAL at 07:03

## 2023-04-02 RX ADMIN — FUROSEMIDE 40 MG: 40 TABLET ORAL at 17:03

## 2023-04-02 RX ADMIN — ENOXAPARIN SODIUM 30 MG: 100 INJECTION SUBCUTANEOUS at 09:05

## 2023-04-02 RX ADMIN — MAGNESIUM HYDROXIDE 30 ML: 400 SUSPENSION ORAL at 09:21

## 2023-04-02 RX ADMIN — FUROSEMIDE 40 MG: 40 TABLET ORAL at 09:06

## 2023-04-02 RX ADMIN — OXYCODONE HYDROCHLORIDE 5 MG: 5 TABLET ORAL at 19:47

## 2023-04-02 RX ADMIN — SUCRALFATE 1 G: 1 TABLET ORAL at 13:20

## 2023-04-02 RX ADMIN — SUCRALFATE 1 G: 1 TABLET ORAL at 17:03

## 2023-04-02 RX ADMIN — FENTANYL CITRATE 50 MCG: 50 INJECTION, SOLUTION INTRAMUSCULAR; INTRAVENOUS at 21:43

## 2023-04-02 RX ADMIN — OXYCODONE HYDROCHLORIDE 5 MG: 5 TABLET ORAL at 13:36

## 2023-04-02 RX ADMIN — ATORVASTATIN CALCIUM 20 MG: 20 TABLET, FILM COATED ORAL at 19:47

## 2023-04-02 RX ADMIN — POTASSIUM CHLORIDE 40 MEQ: 1500 TABLET, EXTENDED RELEASE ORAL at 09:06

## 2023-04-02 RX ADMIN — DRONABINOL 2.5 MG: 2.5 CAPSULE ORAL at 09:18

## 2023-04-02 RX ADMIN — OXYCODONE HYDROCHLORIDE 5 MG: 5 TABLET ORAL at 07:06

## 2023-04-02 RX ADMIN — FENTANYL CITRATE 50 MCG: 50 INJECTION, SOLUTION INTRAMUSCULAR; INTRAVENOUS at 09:03

## 2023-04-02 RX ADMIN — SPIRONOLACTONE 50 MG: 25 TABLET ORAL at 09:05

## 2023-04-02 RX ADMIN — POTASSIUM CHLORIDE 40 MEQ: 1500 TABLET, EXTENDED RELEASE ORAL at 19:47

## 2023-04-02 ASSESSMENT — PAIN DESCRIPTION - DESCRIPTORS
DESCRIPTORS: SHARP
DESCRIPTORS: BURNING
DESCRIPTORS: SHARP
DESCRIPTORS: SHARP
DESCRIPTORS: BURNING
DESCRIPTORS: BURNING

## 2023-04-02 ASSESSMENT — PAIN SCALES - GENERAL
PAINLEVEL_OUTOF10: 9
PAINLEVEL_OUTOF10: 10
PAINLEVEL_OUTOF10: 8
PAINLEVEL_OUTOF10: 9
PAINLEVEL_OUTOF10: 10
PAINLEVEL_OUTOF10: 8

## 2023-04-02 ASSESSMENT — PAIN DESCRIPTION - ORIENTATION
ORIENTATION: MID

## 2023-04-02 ASSESSMENT — PAIN DESCRIPTION - LOCATION
LOCATION: ABDOMEN

## 2023-04-02 NOTE — PLAN OF CARE
Problem: Safety - Adult  Goal: Free from fall injury  Outcome: Progressing  Flowsheets (Taken 4/1/2023 2101)  Free From Fall Injury: Instruct family/caregiver on patient safety     Problem: Discharge Planning  Goal: Discharge to home or other facility with appropriate resources  Outcome: Progressing     Problem: Pain  Goal: Verbalizes/displays adequate comfort level or baseline comfort level  Outcome: Progressing     Problem: Respiratory - Adult  Goal: Achieves optimal ventilation and oxygenation  Outcome: Progressing     Problem: Skin/Tissue Integrity  Goal: Absence of new skin breakdown  Description: 1. Monitor for areas of redness and/or skin breakdown  2. Assess vascular access sites hourly  3. Every 4-6 hours minimum:  Change oxygen saturation probe site  4. Every 4-6 hours:  If on nasal continuous positive airway pressure, respiratory therapy assess nares and determine need for appliance change or resting period.   Outcome: Progressing     Problem: ABCDS Injury Assessment  Goal: Absence of physical injury  Outcome: Progressing  Flowsheets (Taken 4/1/2023 2101)  Absence of Physical Injury: Implement safety measures based on patient assessment     Problem: Nutrition Deficit:  Goal: Optimize nutritional status  Outcome: Progressing

## 2023-04-03 LAB
ABSOLUTE EOS #: 0.15 K/UL (ref 0–0.44)
ABSOLUTE IMMATURE GRANULOCYTE: 0.15 K/UL (ref 0–0.3)
ABSOLUTE LYMPH #: 0.6 K/UL (ref 1.1–3.7)
ABSOLUTE MONO #: 2.25 K/UL (ref 0.1–1.2)
ANION GAP SERPL CALCULATED.3IONS-SCNC: 7 MMOL/L (ref 9–17)
BASOPHILS # BLD: 0 % (ref 0–2)
BASOPHILS ABSOLUTE: 0 K/UL (ref 0–0.2)
BUN SERPL-MCNC: 13 MG/DL (ref 8–23)
CALCIUM SERPL-MCNC: 8.6 MG/DL (ref 8.6–10.4)
CHLORIDE SERPL-SCNC: 97 MMOL/L (ref 98–107)
CO2 SERPL-SCNC: 26 MMOL/L (ref 20–31)
CREAT SERPL-MCNC: 0.84 MG/DL (ref 0.7–1.2)
EOSINOPHILS RELATIVE PERCENT: 1 % (ref 1–4)
GFR SERPL CREATININE-BSD FRML MDRD: >60 ML/MIN/1.73M2
GLUCOSE SERPL-MCNC: 111 MG/DL (ref 70–99)
HCT VFR BLD AUTO: 27.1 % (ref 40.7–50.3)
HGB BLD-MCNC: 8.3 G/DL (ref 13–17)
IMMATURE GRANULOCYTES: 1 %
LYMPHOCYTES # BLD: 4 % (ref 24–43)
MCH RBC QN AUTO: 24.1 PG (ref 25.2–33.5)
MCHC RBC AUTO-ENTMCNC: 30.6 G/DL (ref 28.4–34.8)
MCV RBC AUTO: 78.6 FL (ref 82.6–102.9)
MONOCYTES # BLD: 15 % (ref 3–12)
MORPHOLOGY: ABNORMAL
NRBC AUTOMATED: 0 PER 100 WBC
PDW BLD-RTO: 20.9 % (ref 11.8–14.4)
PLATELET # BLD AUTO: 234 K/UL (ref 138–453)
PMV BLD AUTO: 8.9 FL (ref 8.1–13.5)
POTASSIUM SERPL-SCNC: 4.7 MMOL/L (ref 3.7–5.3)
RBC # BLD: 3.45 M/UL (ref 4.21–5.77)
SEG NEUTROPHILS: 79 % (ref 36–65)
SEGMENTED NEUTROPHILS ABSOLUTE COUNT: 11.85 K/UL (ref 1.5–8.1)
SODIUM SERPL-SCNC: 130 MMOL/L (ref 135–144)
WBC # BLD AUTO: 15 K/UL (ref 3.5–11.3)

## 2023-04-03 PROCEDURE — 97110 THERAPEUTIC EXERCISES: CPT

## 2023-04-03 PROCEDURE — 2060000000 HC ICU INTERMEDIATE R&B

## 2023-04-03 PROCEDURE — 6360000002 HC RX W HCPCS: Performed by: STUDENT IN AN ORGANIZED HEALTH CARE EDUCATION/TRAINING PROGRAM

## 2023-04-03 PROCEDURE — 2580000003 HC RX 258

## 2023-04-03 PROCEDURE — 6370000000 HC RX 637 (ALT 250 FOR IP): Performed by: STUDENT IN AN ORGANIZED HEALTH CARE EDUCATION/TRAINING PROGRAM

## 2023-04-03 PROCEDURE — 2580000003 HC RX 258: Performed by: STUDENT IN AN ORGANIZED HEALTH CARE EDUCATION/TRAINING PROGRAM

## 2023-04-03 PROCEDURE — 36415 COLL VENOUS BLD VENIPUNCTURE: CPT

## 2023-04-03 PROCEDURE — 80048 BASIC METABOLIC PNL TOTAL CA: CPT

## 2023-04-03 PROCEDURE — 97129 THER IVNTJ 1ST 15 MIN: CPT

## 2023-04-03 PROCEDURE — C9113 INJ PANTOPRAZOLE SODIUM, VIA: HCPCS | Performed by: STUDENT IN AN ORGANIZED HEALTH CARE EDUCATION/TRAINING PROGRAM

## 2023-04-03 PROCEDURE — 97535 SELF CARE MNGMENT TRAINING: CPT

## 2023-04-03 PROCEDURE — 85025 COMPLETE CBC W/AUTO DIFF WBC: CPT

## 2023-04-03 PROCEDURE — 6370000000 HC RX 637 (ALT 250 FOR IP)

## 2023-04-03 PROCEDURE — 94760 N-INVAS EAR/PLS OXIMETRY 1: CPT

## 2023-04-03 PROCEDURE — 97130 THER IVNTJ EA ADDL 15 MIN: CPT

## 2023-04-03 PROCEDURE — 97530 THERAPEUTIC ACTIVITIES: CPT

## 2023-04-03 RX ORDER — 0.9 % SODIUM CHLORIDE 0.9 %
500 INTRAVENOUS SOLUTION INTRAVENOUS ONCE
Status: COMPLETED | OUTPATIENT
Start: 2023-04-03 | End: 2023-04-03

## 2023-04-03 RX ORDER — SODIUM CHLORIDE, SODIUM LACTATE, POTASSIUM CHLORIDE, AND CALCIUM CHLORIDE .6; .31; .03; .02 G/100ML; G/100ML; G/100ML; G/100ML
500 INJECTION, SOLUTION INTRAVENOUS ONCE
Status: COMPLETED | OUTPATIENT
Start: 2023-04-03 | End: 2023-04-03

## 2023-04-03 RX ADMIN — ATORVASTATIN CALCIUM 20 MG: 20 TABLET, FILM COATED ORAL at 20:15

## 2023-04-03 RX ADMIN — FENTANYL CITRATE 50 MCG: 50 INJECTION, SOLUTION INTRAMUSCULAR; INTRAVENOUS at 03:01

## 2023-04-03 RX ADMIN — FUROSEMIDE 40 MG: 40 TABLET ORAL at 16:02

## 2023-04-03 RX ADMIN — OXYCODONE HYDROCHLORIDE 5 MG: 5 TABLET ORAL at 16:02

## 2023-04-03 RX ADMIN — FUROSEMIDE 40 MG: 40 TABLET ORAL at 07:45

## 2023-04-03 RX ADMIN — SUCRALFATE 1 G: 1 TABLET ORAL at 11:14

## 2023-04-03 RX ADMIN — OXYCODONE HYDROCHLORIDE 5 MG: 5 TABLET ORAL at 05:36

## 2023-04-03 RX ADMIN — SUCRALFATE 1 G: 1 TABLET ORAL at 16:02

## 2023-04-03 RX ADMIN — SPIRONOLACTONE 50 MG: 25 TABLET ORAL at 07:44

## 2023-04-03 RX ADMIN — SODIUM CHLORIDE, POTASSIUM CHLORIDE, SODIUM LACTATE AND CALCIUM CHLORIDE 500 ML: 600; 310; 30; 20 INJECTION, SOLUTION INTRAVENOUS at 11:19

## 2023-04-03 RX ADMIN — FENTANYL CITRATE 50 MCG: 50 INJECTION, SOLUTION INTRAMUSCULAR; INTRAVENOUS at 23:33

## 2023-04-03 RX ADMIN — ENOXAPARIN SODIUM 30 MG: 100 INJECTION SUBCUTANEOUS at 07:44

## 2023-04-03 RX ADMIN — FENTANYL CITRATE 50 MCG: 50 INJECTION, SOLUTION INTRAMUSCULAR; INTRAVENOUS at 10:44

## 2023-04-03 RX ADMIN — DRONABINOL 2.5 MG: 2.5 CAPSULE ORAL at 07:45

## 2023-04-03 RX ADMIN — SODIUM CHLORIDE, PRESERVATIVE FREE 40 MG: 5 INJECTION INTRAVENOUS at 07:44

## 2023-04-03 RX ADMIN — ANTACID TABLETS 500 MG: 500 TABLET, CHEWABLE ORAL at 03:01

## 2023-04-03 RX ADMIN — ENOXAPARIN SODIUM 30 MG: 100 INJECTION SUBCUTANEOUS at 21:50

## 2023-04-03 RX ADMIN — SUCRALFATE 1 G: 1 TABLET ORAL at 05:35

## 2023-04-03 RX ADMIN — MAGNESIUM HYDROXIDE 30 ML: 400 SUSPENSION ORAL at 07:44

## 2023-04-03 RX ADMIN — POTASSIUM CHLORIDE 40 MEQ: 1500 TABLET, EXTENDED RELEASE ORAL at 07:44

## 2023-04-03 RX ADMIN — SODIUM CHLORIDE 500 ML: 9 INJECTION, SOLUTION INTRAVENOUS at 05:39

## 2023-04-03 RX ADMIN — DRONABINOL 2.5 MG: 2.5 CAPSULE ORAL at 20:15

## 2023-04-03 RX ADMIN — POTASSIUM CHLORIDE 40 MEQ: 1500 TABLET, EXTENDED RELEASE ORAL at 20:15

## 2023-04-03 ASSESSMENT — PAIN DESCRIPTION - DESCRIPTORS
DESCRIPTORS: SHARP
DESCRIPTORS: DISCOMFORT
DESCRIPTORS: SHARP
DESCRIPTORS: ACHING

## 2023-04-03 ASSESSMENT — PAIN SCALES - GENERAL
PAINLEVEL_OUTOF10: 8
PAINLEVEL_OUTOF10: 7
PAINLEVEL_OUTOF10: 5
PAINLEVEL_OUTOF10: 6
PAINLEVEL_OUTOF10: 10

## 2023-04-03 ASSESSMENT — PAIN DESCRIPTION - LOCATION
LOCATION: ABDOMEN
LOCATION: BACK
LOCATION: ABDOMEN

## 2023-04-03 ASSESSMENT — PAIN DESCRIPTION - ORIENTATION
ORIENTATION: MID

## 2023-04-04 VITALS
WEIGHT: 183.64 LBS | RESPIRATION RATE: 20 BRPM | BODY MASS INDEX: 26.35 KG/M2 | HEART RATE: 11 BPM | SYSTOLIC BLOOD PRESSURE: 117 MMHG | OXYGEN SATURATION: 94 % | TEMPERATURE: 98.5 F | DIASTOLIC BLOOD PRESSURE: 58 MMHG

## 2023-04-04 LAB
ABSOLUTE EOS #: 0 K/UL (ref 0–0.4)
ABSOLUTE IMMATURE GRANULOCYTE: 0 K/UL (ref 0–0.3)
ABSOLUTE LYMPH #: 0.5 K/UL (ref 1–4.8)
ABSOLUTE MONO #: 1.49 K/UL (ref 0.1–0.8)
ANION GAP SERPL CALCULATED.3IONS-SCNC: 11 MMOL/L (ref 9–17)
BASOPHILS # BLD: 0 % (ref 0–2)
BASOPHILS ABSOLUTE: 0 K/UL (ref 0–0.2)
BUN SERPL-MCNC: 12 MG/DL (ref 8–23)
CALCIUM SERPL-MCNC: 9.1 MG/DL (ref 8.6–10.4)
CHLORIDE SERPL-SCNC: 97 MMOL/L (ref 98–107)
CO2 SERPL-SCNC: 22 MMOL/L (ref 20–31)
CREAT SERPL-MCNC: 0.82 MG/DL (ref 0.7–1.2)
EOSINOPHILS RELATIVE PERCENT: 0 % (ref 1–4)
GFR SERPL CREATININE-BSD FRML MDRD: >60 ML/MIN/1.73M2
GLUCOSE SERPL-MCNC: 91 MG/DL (ref 70–99)
HCT VFR BLD AUTO: 30.3 % (ref 40.7–50.3)
HGB BLD-MCNC: 8.7 G/DL (ref 13–17)
IMMATURE GRANULOCYTES: 0 %
LYMPHOCYTES # BLD: 4 % (ref 24–44)
MCH RBC QN AUTO: 24.6 PG (ref 25.2–33.5)
MCHC RBC AUTO-ENTMCNC: 28.7 G/DL (ref 28.4–34.8)
MCV RBC AUTO: 85.6 FL (ref 82.6–102.9)
MONOCYTES # BLD: 12 % (ref 1–7)
MORPHOLOGY: ABNORMAL
MORPHOLOGY: ABNORMAL
NRBC AUTOMATED: 0 PER 100 WBC
PDW BLD-RTO: 21.3 % (ref 11.8–14.4)
PLATELET # BLD AUTO: 222 K/UL (ref 138–453)
PMV BLD AUTO: 9.2 FL (ref 8.1–13.5)
POTASSIUM SERPL-SCNC: 5 MMOL/L (ref 3.7–5.3)
RBC # BLD: 3.54 M/UL (ref 4.21–5.77)
SEG NEUTROPHILS: 84 % (ref 36–66)
SEGMENTED NEUTROPHILS ABSOLUTE COUNT: 10.41 K/UL (ref 1.8–7.7)
SODIUM SERPL-SCNC: 130 MMOL/L (ref 135–144)
WBC # BLD AUTO: 12.4 K/UL (ref 3.5–11.3)

## 2023-04-04 PROCEDURE — 85025 COMPLETE CBC W/AUTO DIFF WBC: CPT

## 2023-04-04 PROCEDURE — 6370000000 HC RX 637 (ALT 250 FOR IP): Performed by: STUDENT IN AN ORGANIZED HEALTH CARE EDUCATION/TRAINING PROGRAM

## 2023-04-04 PROCEDURE — 6370000000 HC RX 637 (ALT 250 FOR IP)

## 2023-04-04 PROCEDURE — 80048 BASIC METABOLIC PNL TOTAL CA: CPT

## 2023-04-04 PROCEDURE — C9113 INJ PANTOPRAZOLE SODIUM, VIA: HCPCS | Performed by: STUDENT IN AN ORGANIZED HEALTH CARE EDUCATION/TRAINING PROGRAM

## 2023-04-04 PROCEDURE — 36415 COLL VENOUS BLD VENIPUNCTURE: CPT

## 2023-04-04 PROCEDURE — 6360000002 HC RX W HCPCS: Performed by: STUDENT IN AN ORGANIZED HEALTH CARE EDUCATION/TRAINING PROGRAM

## 2023-04-04 PROCEDURE — 99213 OFFICE O/P EST LOW 20 MIN: CPT

## 2023-04-04 PROCEDURE — 2580000003 HC RX 258: Performed by: STUDENT IN AN ORGANIZED HEALTH CARE EDUCATION/TRAINING PROGRAM

## 2023-04-04 RX ORDER — OXYCODONE HYDROCHLORIDE AND ACETAMINOPHEN 5; 325 MG/1; MG/1
1 TABLET ORAL EVERY 6 HOURS PRN
Qty: 20 TABLET | Refills: 0 | Status: SHIPPED | OUTPATIENT
Start: 2023-04-04 | End: 2023-04-09

## 2023-04-04 RX ORDER — DRONABINOL 2.5 MG/1
2.5 CAPSULE ORAL 2 TIMES DAILY
Qty: 60 CAPSULE | Refills: 0 | Status: ON HOLD | OUTPATIENT
Start: 2023-04-04 | End: 2023-04-17 | Stop reason: HOSPADM

## 2023-04-04 RX ADMIN — POTASSIUM CHLORIDE 40 MEQ: 1500 TABLET, EXTENDED RELEASE ORAL at 09:13

## 2023-04-04 RX ADMIN — OXYCODONE HYDROCHLORIDE 5 MG: 5 TABLET ORAL at 09:11

## 2023-04-04 RX ADMIN — SPIRONOLACTONE 50 MG: 25 TABLET ORAL at 09:15

## 2023-04-04 RX ADMIN — FUROSEMIDE 40 MG: 40 TABLET ORAL at 09:13

## 2023-04-04 RX ADMIN — DRONABINOL 2.5 MG: 2.5 CAPSULE ORAL at 09:11

## 2023-04-04 RX ADMIN — SUCRALFATE 1 G: 1 TABLET ORAL at 11:20

## 2023-04-04 RX ADMIN — MAGNESIUM HYDROXIDE 30 ML: 400 SUSPENSION ORAL at 09:14

## 2023-04-04 RX ADMIN — ENOXAPARIN SODIUM 30 MG: 100 INJECTION SUBCUTANEOUS at 09:13

## 2023-04-04 RX ADMIN — SODIUM CHLORIDE, PRESERVATIVE FREE 40 MG: 5 INJECTION INTRAVENOUS at 09:15

## 2023-04-04 RX ADMIN — SUCRALFATE 1 G: 1 TABLET ORAL at 05:24

## 2023-04-04 ASSESSMENT — PAIN DESCRIPTION - DESCRIPTORS
DESCRIPTORS: ACHING
DESCRIPTORS: ACHING

## 2023-04-04 ASSESSMENT — PAIN DESCRIPTION - LOCATION
LOCATION: ABDOMEN
LOCATION: ABDOMEN

## 2023-04-04 ASSESSMENT — PAIN SCALES - GENERAL
PAINLEVEL_OUTOF10: 7
PAINLEVEL_OUTOF10: 7
PAINLEVEL_OUTOF10: 6

## 2023-04-04 ASSESSMENT — PAIN DESCRIPTION - PAIN TYPE: TYPE: SURGICAL PAIN

## 2023-04-04 ASSESSMENT — PAIN DESCRIPTION - ORIENTATION
ORIENTATION: MID
ORIENTATION: MID

## 2023-04-04 NOTE — PLAN OF CARE
Problem: Safety - Adult  Goal: Free from fall injury  Outcome: Progressing     Problem: Discharge Planning  Goal: Discharge to home or other facility with appropriate resources  Outcome: Progressing     Problem: Pain  Goal: Verbalizes/displays adequate comfort level or baseline comfort level  Outcome: Progressing     Problem: Respiratory - Adult  Goal: Achieves optimal ventilation and oxygenation  Outcome: Progressing     Problem: Skin/Tissue Integrity  Goal: Absence of new skin breakdown  Description: 1. Monitor for areas of redness and/or skin breakdown  2. Assess vascular access sites hourly  3. Every 4-6 hours minimum:  Change oxygen saturation probe site  4. Every 4-6 hours:  If on nasal continuous positive airway pressure, respiratory therapy assess nares and determine need for appliance change or resting period.   Outcome: Progressing     Problem: ABCDS Injury Assessment  Goal: Absence of physical injury  Outcome: Progressing     Problem: Nutrition Deficit:  Goal: Optimize nutritional status  Outcome: Progressing

## 2023-04-04 NOTE — CARE COORDINATION
03/27/23 0751   Readmission Assessment   Number of Days since last admission?    (Readmission assessment not appropriate due to transfer from [facility])
Met with patient to discuss transitional planning. Advised patient that if he would like to go to rehab, [address] [place] [address] can accept him. Patient states he thinks he would like to go home at time of discharge but is uncertain. Discussed home healthcare with patient if his plan is to return home. Patient is agreeable to home healthcare if he goes home. Discussed freedom of choice for home care. Referral sent to [facility].
Met with patient to discuss transitional planning. Goal is home but patient is agreeable to SNF. [place] of choice provided. Referrals sent to [facility], [facility].
Met with patient to discuss transitional planning. Patient declines SNF and states he feels comfortable going home with home healthcare and managing his new ostomy. Patient states he already has a walker at home. Patient has been accepted for home care with [facility]. Patient states his ex wife Manju Srivastava will provide transportation home. Patient is agreeable to plan. Spoke with Emmie Maldonado from [facility] home health to notify that patient is discharging home today and that he has a new ostomy. Further advised that patient has a high readmission risk score and Emmie Maldonado states they have a program that they will place patient in to follow up with patient daily for the first two weeks that he is at home. 1215: with patient's permission, spoke with his ex wife Manju Srivastava to update on plan for patient to discharge today with home healthcare. Nilam confirmed that she will provide transportation home.
Met with patient to discuss transitional planning. Patient would like to go home with home healthcare. Patient was accepted by [address] for home care when ready for discharge. Patient states his ex wife Vickie Rosa will provide transportation home. Patient agreeable to plan. Additionally, if patient were to need SNF, he was accepted at [facility].
Issues/Barriers to RETURNING to current housing: none  Potential Assistance needed at discharge: (P) N/A            Potential DME:  none  Patient expects to discharge to: (P) [facility] [facility] for transportation at discharge: (P) Other (see comment) (Ex wife Anette will transport)    Financial    Payor: Govind Barbour / Plan: ADVANTAGE [address]i [address] / Product Type: *No Product type* /     Does insurance require precert for SNF: Yes    Potential assistance Purchasing Medications: (P) No  Meds-to-Beds request:        Jayden Blue [address], [address] [address] [facility] [phone] - F [phone]  [address] [place] [address]  [address] [place] [id number]  Phone: [phone] Fax: [phone] [address], [place] - [address] [phone] Camryn Earnest [phone]  [address] [facility] [address]  Phone: [phone] Fax: [phone]      Notes:    Factors facilitating achievement of predicted outcomes: Family support, Cooperative, Pleasant, and Has needed Durable Medical Equipment at home    Barriers to discharge: Pain, Limited family support, and Medical complications    Additional Case Management Notes: Transitional planning: Plan is to return home independently. Has ride. Ex wife Anette will transport. Address, emergency contact, PCP and insurance confirmed with patient. Patient denies needs at this time. Will continue to follow. The Plan for Transition of Care is related to the following treatment goals of Bowel perforation ([facility] [address] [K28.6]    IF APPLICABLE: The Patient and/or patient representative Laverne Sanders and his family were provided with a choice of provider and agrees with the discharge plan.  Freedom of choice list with basic dialogue that supports the patient's individualized plan of care/goals and shares the quality data associated with the providers was provided to: (P) Patient   Patient Representative Name:       The Patient and/or Patient Representative Agree

## 2023-04-04 NOTE — PROGRESS NOTES
03/26/23 1111   Patient Observation   SpO2 100 %   Vent Information   Vent Mode (S)  CPAP/PS   Ventilator Settings   PEEP/CPAP (cmH2O) 8   FiO2  40 %   Pressure Support (cm H2O) (S)  8 cm H2O   Vent Patient Data (Readings)   Vt (Measured) 850 mL   Rate Measured 11 br/min       Ventilator weaning trial started.
2100: Patient transferred to 4B unit safely with all belongings. Family updated on transfer to stepdown unit.
[address] [employer]  Speech Language Pathology    Date: 3/29/2023  Patient Name: Genevieve Harrison  YOB: 1960   AGE: 60 y.o. MRN: [id number]        Patient Not Available for Speech Therapy     Due to:  [] Testing  [] Hemodialysis  [] Cancelled by RN  [] Surgery   [] Intubation/Sedation/Pain Medication  [] Medical instability  [x] Other: Pt declined services at this date. Pt was woken from sleep and reported that he felt nauseous and in pain: nurse notified. Nurse reports that the Pt had been having visual hallucinations earlier in the day. Next scheduled treatment: 3/29/2023  Completed by: Completed by:  Marcelle Conway  Clinician    Cosigned By: Lenore Benitez S.CCC/SLP
[address] [facility] [employer]  Speech Language Pathology    Date: 3/26/2023  Patient Name: Genevieve Harrison  YOB: 1960   AGE: 60 y.o. MRN: [id number]        Patient Not Available for Speech Therapy     Due to:  [] Testing  [] Hemodialysis  [] Cancelled by RN  [] Surgery   [x] Intubation/Sedation/Pain Medication  [] Medical instability  [] Other:    Next scheduled treatment: 3/27/ or as medical stable  Completed by: Johnathan Bryan M.A.  CCC-SLP
[address] [facility]  Speech Language Pathology    Date: 3/31/2023  Patient Name: Trupti Portillo  YOB: 1960   AGE: 60 y.o.   MRN: [id number]        Patient Not Available for Speech Therapy     Due to:  [] Testing  [] Hemodialysis  [] Cancelled by RN  [] Surgery   [] Intubation/Sedation/Pain Medication  [] Medical instability  [x] Other: Pt with other discipline    Next scheduled treatment: 4/3/23  Completed by: KATEY Mcghee, M.S. [place]
CLINICAL PHARMACY NOTE: MEDS TO BEDS    Total # of Prescriptions Filled: 1   The following medications were delivered to the patient:  Percocet 5-325mg    Additional Documentation: delivered to patient in room 426 4/4 at 1:12pm. Co-pay $1.77 cloPenboost.
Comprehensive Nutrition Assessment    Type and Reason for Visit:  RD Nutrition Re-Screen/LOS (6 day length of stay)    Nutrition Recommendations/Plan:   Continue with current plan of care, diet. Continue to monitor weights, intakes, labs, output and follow up. Malnutrition Assessment:  Malnutrition Status:  Mild malnutrition (03/31/23 1507)    Context:  Chronic Illness     Findings of the 6 clinical characteristics of malnutrition:  Energy Intake:  Mild decrease in energy intake (Comment)  Weight Loss:  Greater than 5% over 1 month     Body Fat Loss:  Unable to assess     Muscle Mass Loss:  Unable to assess    Fluid Accumulation:  No significant fluid accumulation     Strength:  Not Performed    Nutrition Assessment:    61 y/o male, admitted related to bowel perforation, necrotic infarction of right colon and feculent peritonitis . PMH ascites due to ETOH cirrhosis, ETOH withdrawal with delirium, hep B, ROBYN. Patient is s/p rxploratory laparotomy right colectomy with end ileostomy. Attempted to visit with patient x 2. First attempted, patient with therapy, second attempt, patient sleeping and would not wake. Observed lunch tray on bedside table, 0% consumed. Documented PO intakes % today. + iliostomy output, BS hypoactive. EMR weight history indicates a significant weight loss of 8% in the past two months, likely related to current condition. Recommend to cont with current plan of care. Nutrition Related Findings:    + Ileostomy, output 100mL; Labs/Meds reviewed Wound Type:  (SI abdomen)       Current Nutrition Intake & Therapies:    Average Meal Intake: %  Average Supplements Intake: None Ordered  ADULT DIET; Regular    Anthropometric Measures:  Current Body Weight: 194 lb 4 oz (88.1 kg),   IBW.  Weight Source: Bed Scale  Current BMI (kg/m2): 27.9  Usual Body Weight: 200 lb (90.7 kg)  % Weight Change (Calculated): -2.9  Weight Adjustment For: No Adjustment  BMI Categories: Overweight (BMI
Consulted for End Ileostomy Care and Teaching          History: 3/25/23 LAPAROTOMY EXPLORATORY, RIGHT COLECTOMY, CREATION IF END ILEOSTOMY with Dr. Douglas Villarreal for  ischemic necrosis of right colon with perforation and feculent peritonitis. Met at bedside for initial ostomy education; discharging home today. States he lives by himself; plans to have Gabriela Willams. Written material provided and briefly reviewed. Discussed anatomy of ileostomy and expected loose effluent; discussed importance of maintaining hydration and nutrition. Patient reports poor appetite; advised to continue protein supplements. Advised to avoid high residue foods  such as raw celery, apple peels, pineapple, popcorn to prevent food blockage. Demonstrated pouch change. Stoma is oval at 25 mm x 35 mm; pattern provided. Flat pouch used with ring barrier; may benefit from convex light appliance next pouch change. OSTOMY ASSESSMENT:     04/04/23 1030   Ileostomy/Jejunostomy RLQ Ileostomy   Placement Date/Time: 03/25/23 7855   Present on Admission/Arrival: No  Location: RLQ  Ostomy Type: Ileostomy   Stomal Appliance 1 piece; Changed   Stoma  Assessment Red;Moist;Protrudes   Peristomal Assessment Intact  (midline incision w/ intermittant staples)   Treatment Bag change;Site care; Liquid skin barrier  (Brava seal)   Stool Appearance Loose   Stool Color [employer] used    Coloplast #[id number] with Lorella Griffins seal.  Extra flat as well as Jojo Pierre # [id number] with Brava seal #[id number] provided for home. Plan of care: Will rely on Gabriela Willams  to support continued education in the home and reinforce teaching offered today. Concern for ability to provide self care given limited practice with pouch emptying and he has only observed 1 pouch change.
Dr. Betty Saavedra notified that patient has good IV access and request for TLC to be discontinued. Awaiting further directions.
General Surgery:  Daily Progress Note          PATIENT NAME: Katey Toscano     TODAY'S DATE: 3/29/2023, 5:29 AM    SUBJECTIVE:     Pt seen and examined at bedside. No acute overnight events. Afebrile, vitals normal and stable. Pain well controlled. Wound redressed at bedside. Having ostomy output. Voiding spontaneously. Increasing O2 requirement. Educated pt on IS and getting up and out of bed to improve pulmonary function. OBJECTIVE:   VITALS:  /70   Pulse 86   Temp 97.4 °F (36.3 °C) (Temporal)   Resp 14   Wt 184 lb 4.9 oz (83.6 kg)   SpO2 91%   BMI 26.44 kg/m²      INTAKE/OUTPUT:      Intake/Output Summary (Last 24 hours) at 3/29/2023 0529  Last data filed at 3/29/2023 0414  Gross per 24 hour   Intake 620 ml   Output 1830 ml   Net -1210 ml       PHYSICAL EXAM:  General Appearance: awake, alert, oriented, in no acute distress  HEENT:  Normocephalic, atraumatic, mucus membranes moist   Heart: Regular rate and rhythm  Lungs: increased effort with symmetric rise and fall of chest wall  Abdomen: Soft, nondistended, minimal TTP. Incision is clean, dry, and intact. No bleeding. No erythema. Ileostomy patent with green stool output    Extremities: No cyanosis, pitting edema, rashes noted. Skin: Skin color, texture, turgor normal. No rashes or lesions. Data:  CBC:   Recent Labs     03/27/23  0600 03/28/23  0807 03/29/23  0305   WBC 9.4 8.8 8.9   HGB 7.6* 8.4* 8.4*    182 200     Chemistry:   Recent Labs     03/26/23  1524 03/26/23  1722 03/27/23  0600 03/28/23  0807 03/29/23  0305   NA PLEASE DISREGARD RESULTS. SPECIMEN CONTAMINATED. < > 139 138 136   K PLEASE DISREGARD RESULTS. SPECIMEN CONTAMINATED. < > 3.7 3.6* 3.3*   CL PLEASE DISREGARD RESULTS. SPECIMEN CONTAMINATED. < > 109* 105 103   CO2 PLEASE DISREGARD RESULTS. SPECIMEN CONTAMINATED. < > 23 24 24   GLUCOSE PLEASE DISREGARD RESULTS. SPECIMEN CONTAMINATED. < > 123* 96 115*   BUN PLEASE DISREGARD RESULTS.   SPECIMEN
General Surgery:  Daily Progress Note          PATIENT NAME: Maricruz Rangel     TODAY'S DATE: 3/31/2023, 5:37 AM    SUBJECTIVE:     Pt seen and examined at bedside. No acute overnight events. Afebrile, vitals normal and stable. Pain well controlled. Tolerating regular diet well. No vomiting. Pt does report some heartburn. Good urinary output. Leukocytosis, will confirm with AM labs, CXR, UA.     OBJECTIVE:   VITALS:  BP (!) 104/59   Pulse 96   Temp 98.5 °F (36.9 °C) (Oral)   Resp 14   Wt 192 lb 3.9 oz (87.2 kg)   SpO2 93%   BMI 27.58 kg/m²      INTAKE/OUTPUT:      Intake/Output Summary (Last 24 hours) at 3/31/2023 0537  Last data filed at 3/30/2023 2200  Gross per 24 hour   Intake --   Output 1500 ml   Net -1500 ml       PHYSICAL EXAM:  General Appearance: awake, alert, oriented, in no acute distress  HEENT:  Normocephalic, atraumatic, mucus membranes moist   Heart: Regular rate and rhythm  Lungs: normal effort with symmetric rise and fall of chest wall  Abdomen: Soft, nondistended, minimal TTP. Incision is clean, dry, and intact. No bleeding. No erythema. Ileostomy patent with green stool output     Extremities: No cyanosis, pitting edema, rashes noted. Skin: Skin color, texture, turgor normal. No rashes or lesions.       Data:  CBC:   Recent Labs     03/28/23  0807 03/29/23  0305 03/30/23  0817   WBC 8.8 8.9 16.8*   HGB 8.4* 8.4* 8.6*    200 178     Chemistry:   Recent Labs     03/28/23  0807 03/29/23  0305 03/29/23  2233 03/30/23  0817    136  --  132*   K 3.6* 3.3*  --  3.9    103  --  100   CO2 24 24  --  23   GLUCOSE 96 115*  --  111*   BUN 12 11  --  8   CREATININE 0.71 0.58*  --  0.58*   MG 1.6 1.5*  --  1.5*   ANIONGAP 9 9  --  9   LABGLOM >60 >60  --  >60   CALCIUM 8.4* 8.3*  --  8.1*   PHOS 3.2 3.7  --  2.6   TROPHS  --   --  22  --      Hepatic:   Recent Labs     03/28/23  0807 03/29/23  0305 03/30/23  0817   AST 91* 147* 58*   ALT 32 58* 38   ALKPHOS 104 133* 119   BILITOT
Occupational Therapy  Facility/Department: [address] STEPDOWN  Occupational Therapy Daily Treatment Note    Name: Val Vargas  : 1960  MRN: [id number]  Date of Service: 3/31/2023    Discharge Recommendations:  Patient would benefit from continued therapy after discharge  OT Equipment Recommendations  Equipment Needed: Yes (Pt used standard walker in therapy session but requested RW for future.)  Mobility Devices: Christine Alma: Rolling       Patient Diagnosis(es): The encounter diagnosis was Bowel perforation ([address] Past Medical History:  has a past medical history of Abnormal EKG, Acute deep vein thrombosis (DVT) of brachial vein of right upper extremity ([address] Adenocarcinoma in a polyp ([address] Alcoholic cirrhosis of liver with ascites ([address] Anemia, Anxiety, Arthritis, Back pain, chronic, Lezama esophagus, Bleeding gastric varices, BPH (benign prostatic hypertrophy), Cholelithiasis, Cirrhosis ([address] COVID-19, COVID-19 vaccine series completed, DDD (degenerative disc disease), lumbar, Depression, Esophageal cancer ([address] Esophageal varices ([address] Fatty liver, GERD (gastroesophageal reflux disease), GI bleed, Heart murmur, Hep C w/o coma, chronic ([address] Hiatal hernia, History of alcohol abuse, History of blood transfusion, History of colon polyps, History of tobacco abuse, [place] (hard of hearing), Hyperlipidemia, Hypertension, Hyponatremia, Hypotension, Mastoid disorder, bilateral, Pericardial effusion, PONV (postoperative nausea and vomiting), Poor venous access, Port-A-Cath in place, Portal hypertension ([address] Sciatica, Secondary esophageal varices ([address] Shortness of breath, Spinal stenosis, Stomach ulcer, Thrombocytopenia ([address] Tubular adenoma of colon, Vitamin D deficiency, and Wears glasses. Past Surgical History:  has a past surgical history that includes Bunionectomy; Nasal septum surgery; other surgical history (2016); Colonoscopy;  Colonoscopy (10/05/2016); other surgical history
Occupational Therapy  Facility/Department: [facility] CAR 1- SICU  Occupational Therapy Initial Assessment    Name: Frank Lisa  : 1960  MRN: [id number]  Date of Service: 3/27/2023  Obtained from medical chart:   \" \"The patient is a 62 y.o. male  with medical history of cirrhosis with variceal bleeds who presents as transfer from [place] for surgical evaluation. CT abdomen/pelvis performed this evening revealed possible right colon infarction and free air within the abdomen. General surgery was notified prior to arrival and OR was notified. Pt was evaluated upon arrival. Tachycardic. Abdominal distension and moderate diffuse tenderness on light palpation. Decision to go emergently to OR for ex-lap, possible bowel resection, possible ostomy creation. The risks, benefits, and alternatives of the procedure were explained and all questions were answered. \"    Discharge Recommendations:  Patient would benefit from continued therapy after discharge  OT Equipment Recommendations  Equipment Needed: Yes  Mobility Devices: ADL Assistive Devices  ADL Assistive Devices: Reacher;Shower Chair with back       Patient Diagnosis(es): The encounter diagnosis was Bowel perforation (HCC).  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 
On call provider notified of pts O2 desaturation and initiation of the nasal cannula protocol. Pt placed on 2L.     Electronically signed by Sangita Lovelace RN on 3/29/2023 at 6:15 AM
Order obtained for extubation. SpO2 of 100 on 40% FiO2. Patient extubated and placed on 4 liters/min via nasal cannula. Post extubation SpO2 is 100% with HR  78 bpm and RR 18 breaths/min. Patient had mild cough that was non-productive. Extubation Well tolerated by patient. Harmeet WOODALL RCP   1:51 PM
Physical Therapy        Physical Therapy Cancel Note      DATE: 2023    NAME: Darling Nguyen  MRN: [id number]   : 1960      Patient not seen this date for Physical Therapy due to:    Patient Declined: When asked to participate with therapy, pt states \"Not right now, thank you\"      Electronically signed by Kai Grimaldo PTA on 2023 at 9:26 AM
Physical Therapy        Physical Therapy Cancel Note      DATE: 2023    NAME: Sabina Ch  MRN: [id number]   : 1960      Patient not seen this date for Physical Therapy due to:    Patient Declined: When asked to participate with therapy, pt states \"No\". RN present & aware of  pt's refusal to participate with therapy.       Electronically signed by Jade Ceballos PTA on 2023 at 11:45 AM
Physical Therapy        Physical Therapy Cancel Note      DATE: 3/26/2023    NAME: Hardik Yang  MRN: [id number]   : 1960      Patient not seen this date for Physical Therapy due to:    Strict Bedrest:Per protocol will await removal of SBR orders prior to PT evaluation.       Electronically signed by Dhruv Joseph PT on 3/26/2023 at 7:25 AM
Physical Therapy        Physical Therapy Cancel Note      DATE: 3/29/2023    NAME: Chata Mares  MRN: [id number]   : 1960      Patient not seen this date for Physical Therapy due to:    Patient Declined: Pt refused, stated \" Im having a bad day today\".   Pt will resume as able      Electronically signed by Birdena Romberg, PTA on 3/29/2023 at 2:45 PM
Pt ambulated in hallway @90feet. Pt stopped to take a 30sec break x4. Pt c/o heartburn and states he needs wheels on the front of the walker. Pt gait steady, one episode of pt swaying , pt immediately recovered and continued to ambulate back to room. Pt assisted back to bed and assisted with comfortable positioning, pt c/o pain to abd sharp 6/10, writer medicated pt with prn pain med. Abd wound cleaned with normal saline and dry sterile dressing applied.
Pt assisted out of bed per writer, stand by assist pt while he walked with his walker about 15 feet  from bed to a few steps into the conner and back to bed. Pt requested to turn around d/t discomfort and fatigue. Pt incision  began to leak scant amount of serosanguinous drainage at this time. Pt placed back to bed, repositioned for comfort, and sterile dressing applied to incision.
Pt opened colostomy bag to let excess air(gas) out per self. Tolerated well. Pt feeling more confident letting extra gas out of colostomy bag with nurse at bedside.
Surgery notified of pts request for melatonin to help him sleep. No new orders.      Electronically signed by Josette Griffiths RN on 3/29/2023 at 3:42 AM
(36.9 °C)  Min: 97.9 °F (36.6 °C)  Max: 98.8 °F (67.6 °C) BP Systolic (60TER), BPH:953 , Min:102 , RTB:822   Diastolic (79EBR), GHN:72, Min:45, Max:103   Pulse Pulse  Av  Min: 79  Max: 101 Resp Resp  Av.3  Min: 9  Max: 26 Pulse ox SpO2  Av.5 %  Min: 95 %  Max: 100 %    Consults:  Psych, cardiology, gastroenterology, neurology  Transfer  Checklist:  [x]   Vital signs changed to q4 hours x 48 hours, continuous telemetry x 48 hours  [x]   Provider assessment BID x 48 hours  [x]   Daily labs x 48 hours  []   T[x]   Medications/orders reviewed    Plan and recommended follow-up:    Extubated 3/27  Prbc 3/26  Midline incision changed 3/27 by gen surg  Trauma,/acute care will follow until he leaves ICU       Electronically signed by MASSIMO Olvera CNP on 3/27/2023 at 1:48 PM
SPECIMEN CONTAMINATED. 11 12   CREATININE  --  0.57*   < > PLEASE DISREGARD RESULTS. SPECIMEN CONTAMINATED. 0.49* 0.57*   MG  --  1.2*   < > PLEASE DISREGARD RESULTS. SPECIMEN CONTAMINATED. 2.0 2.1   ANIONGAP  --  10   < > Can not be calculated 9 7*   LABGLOM  --  >60   < > Can not be calculated >60 >60   CALCIUM  --  8.3*   < > PLEASE DISREGARD RESULTS. SPECIMEN CONTAMINATED. 8.0* 8.1*   CAION 1.11* 1.28  --   --   --   --    PHOS  --  3.9   < > PLEASE DISREGARD RESULTS. SPECIMEN CONTAMINATED. 3.3 3.3   PROBNP  --  379*  --   --   --   --    TROPHS  --  16  --   --   --   --    LACTACIDWB 2.5*  --   --  1.5  --   --     < > = values in this interval not displayed. Hepatic:   Recent Labs     03/26/23  1524 03/26/23  1722 03/27/23  0600   AST PLEASE DISREGARD RESULTS. SPECIMEN CONTAMINATED. 24 25   ALT PLEASE DISREGARD RESULTS. SPECIMEN CONTAMINATED. 10 10   ALKPHOS PLEASE DISREGARD RESULTS. SPECIMEN CONTAMINATED. 76 79   BILITOT PLEASE DISREGARD RESULTS. SPECIMEN CONTAMINATED. 2.9* 2.6*   BILIDIR PLEASE DISREGARD RESULTS. SPECIMEN CONTAMINATED. 1.0* 1.0*     Coagulation:   Recent Labs     03/25/23  2330 03/26/23  0124 03/26/23  0534 03/26/23  1524 03/26/23  1722 03/27/23  0600   APTT 45.2*  --   --  40.9*  --   --    PROT  --    < > 4.7* PLEASE DISREGARD RESULTS. SPECIMEN CONTAMINATED. 4.8* 4.6*   INR 2.2   < > 1.8 1.5  --  1.6    < > = values in this interval not displayed. Radiology Review:    XR CHEST PORTABLE    Result Date: 3/26/2023  Stable chest.         ASSESSMENT:  Active Hospital Problems    Diagnosis Date Noted    Bowel perforation [facility]) [K63.1] 03/25/2023       60 y.o. male POD #3 from exploratory laparotomy with right colectomy and ileostomy creation. Plan:  Neuro:   -Pain and nausea control as needed. CV:   -Hemodynamically stable. -Off vasopressor support.   -Home meds: atorvastatin  Heme:  -Hgb: awaiting AM labs     Pulm:   Extubated 3/26. Saturating 98% on room air.
bilateral, Pericardial effusion, PONV (postoperative nausea and vomiting), Poor venous access, Port-A-Cath in place, Portal hypertension ([address] Sciatica, Secondary esophageal varices (HCC), Shortness of breath, Spinal stenosis, Stomach ulcer, Thrombocytopenia ([address] Tubular adenoma of colon, Vitamin D deficiency, and Wears glasses. Past Surgical History:  has a past surgical history that includes Bunionectomy; Nasal septum surgery; other surgical history (01/04/2016); Colonoscopy; Colonoscopy (10/05/2016); other surgical history (11/21/2016); other surgical history (12/19/2016); knee surgery (Left); Bunionectomy (Left); Endoscopy, colon, diagnostic; pr neuroplasty &/transpos median nrv carpal tunne (Right, 08/29/2017); Carpal tunnel release (Right); pr neuroplasty &/transpos median nrv carpal tunne (Left, 10/31/2017); Colonoscopy (N/A, 03/30/2018); Colonoscopy (03/30/2018); pr njx aa&/strd tfrml epi lumbar/sacral 1 level (Bilateral, 09/06/2018); other surgical history (09/28/2018); pr njx aa&/strd tfrml epi lumbar/sacral 1 level (N/A, 09/28/2018); Pain management procedure (Left, 07/09/2020); Pain management procedure (Left, 07/20/2020); Pain management procedure (Bilateral, 08/17/2020); other surgical history (Right, 11/23/2020); Nerve Block (Right, 11/23/2020); Pain management procedure (Bilateral, 12/07/2020); Upper gastrointestinal endoscopy (N/A, 12/29/2020); Upper gastrointestinal endoscopy (N/A, 02/02/2021); Upper gastrointestinal endoscopy (N/A, 02/12/2021); Upper gastrointestinal endoscopy (02/12/2021); [place] tooth extraction; IR PORT PLACEMENT > 5 YEARS (04/19/2021); Upper gastrointestinal endoscopy (N/A, 08/31/2021); Upper gastrointestinal endoscopy (N/A, 01/21/2022); Upper gastrointestinal endoscopy (N/A, 04/15/2022); Colonoscopy (N/A, 04/16/2022); Upper gastrointestinal endoscopy (N/A, 06/06/2022); Upper gastrointestinal endoscopy (N/A, 06/09/2022); Paracentesis;  Upper gastrointestinal endoscopy (N/A,
diagnostic; pr neuroplasty &/transpos median nrv carpal tunne (Right, 08/29/2017); Carpal tunnel release (Right); pr neuroplasty &/transpos median nrv carpal tunne (Left, 10/31/2017); Colonoscopy (N/A, 03/30/2018); Colonoscopy (03/30/2018); pr njx aa&/strd tfrml epi lumbar/sacral 1 level (Bilateral, 09/06/2018); other surgical history (09/28/2018); pr njx aa&/strd tfrml epi lumbar/sacral 1 level (N/A, 09/28/2018); Pain management procedure (Left, 07/09/2020); Pain management procedure (Left, 07/20/2020); Pain management procedure (Bilateral, 08/17/2020); other surgical history (Right, 11/23/2020); Nerve Block (Right, 11/23/2020); Pain management procedure (Bilateral, 12/07/2020); Upper gastrointestinal endoscopy (N/A, 12/29/2020); Upper gastrointestinal endoscopy (N/A, 02/02/2021); Upper gastrointestinal endoscopy (N/A, 02/12/2021); Upper gastrointestinal endoscopy (02/12/2021); [place] tooth extraction; IR PORT PLACEMENT > 5 YEARS (04/19/2021); Upper gastrointestinal endoscopy (N/A, 08/31/2021); Upper gastrointestinal endoscopy (N/A, 01/21/2022); Upper gastrointestinal endoscopy (N/A, 04/15/2022); Colonoscopy (N/A, 04/16/2022); Upper gastrointestinal endoscopy (N/A, 06/06/2022); Upper gastrointestinal endoscopy (N/A, 06/09/2022); Paracentesis; Upper gastrointestinal endoscopy (N/A, 09/14/2022); Upper gastrointestinal endoscopy (N/A, 10/19/2022); Upper gastrointestinal endoscopy (N/A, 10/31/2022); IR TIPS INSERTION (12/01/2022); Esophagogastroduodenoscopy (12/29/2022); Upper gastrointestinal endoscopy (12/29/2022); Esophagogastroduodenoscopy (N/A, 01/03/2023); Upper gastrointestinal endoscopy (N/A, 01/03/2023); Upper gastrointestinal endoscopy (N/A, 01/26/2023); thoracentesis; Upper gastrointestinal endoscopy (N/A, 2/13/2023); Upper gastrointestinal endoscopy (N/A, 3/17/2023); and laparotomy (N/A, 3/25/2023).     Assessment   Body Structures, Functions, Activity Limitations Requiring Skilled Therapeutic Intervention:
imaging of spine R93.7    Cervical spinal stenosis M48.02    Spinal stenosis of lumbar region with neurogenic claudication M48.062    Low hemoglobin D64.9    Symptomatic anemia, microcytic, acute D64.9    Hypotension I95.9    Former smoker, 50+ pack years, quit 2016 Z87.891    HLD (hyperlipidemia) E78.5    Esophageal adenocarcinoma (HCC) C15.9    Anemia D64.9    Acute kidney injury ([facility] [address] N17.9    Ascites due to alcoholic cirrhosis (HCC) K30.66    Shortness of breath R06.02    Drop in hemoglobin R71.0    Esophageal polyp K22.81    Acute kidney failure, unspecified (HCC) N17.9    Muscle weakness (generalized) M62.81    Other abnormalities of gait and mobility R26.89    GI bleed K92.2    Goals of care, counseling/discussion Z71.89    ACP (advance care planning) Z71.89    Palliative care encounter Z51.5    S/P TIPS (transjugular intrahepatic portosystemic shunt) I08.750    Acute metabolic encephalopathy W20.11    Lezama's esophagus with dysplasia K22.719    Mastoid disorder, bilateral H74.93    Acute deep vein thrombosis (DVT) of brachial vein of right upper extremity (HCC) I82.621    Chronic hepatitis C without hepatic coma (HCC) B18.2    Swelling of joint of upper arm, right M25.421    Anasarca R60.1    Lightheadedness R42    Alcohol withdrawal syndrome, with delirium (HCC) F10.931    Hemorrhage of rectum and anus K62.5    Bowel perforation (HCC) K63.1       Pain: Pt did not c/o pain    Cognitive Treatment    Treatment time: 10:17 - 10:50      Subjective: [x] Alert [x] Cooperative     [] Confused     [] Agitated    [] Lethargic      Objective/Assessment:    Recall: Delayed Recall: 0/3 increased to 1/3 with max verbal cues    Problem Solving/Reasoning: Add to the Category: Ault: 7/10 increased to 10/10 with min to mod verbal cues  Similarities/Differences: 4/6 did not increase with min to mod verbal cues  Stating Situational Problems: 6/7 did not increase with mod verbal cues    Other: Pt's voice severely hoarse
imaging of spine R93.7    Cervical spinal stenosis M48.02    Spinal stenosis of lumbar region with neurogenic claudication M48.062    Low hemoglobin D64.9    Symptomatic anemia, microcytic, acute D64.9    Hypotension I95.9    Former smoker, 50+ pack years, quit 2016 Z87.891    HLD (hyperlipidemia) E78.5    Esophageal adenocarcinoma (HCC) C15.9    Anemia D64.9    Acute kidney injury ([address] N17.9    Ascites due to alcoholic cirrhosis (HCC) J92.10    Shortness of breath R06.02    Drop in hemoglobin R71.0    Esophageal polyp K22.81    Acute kidney failure, unspecified (HCC) N17.9    Muscle weakness (generalized) M62.81    Other abnormalities of gait and mobility R26.89    GI bleed K92.2    Goals of care, counseling/discussion Z71.89    ACP (advance care planning) Z71.89    Palliative care encounter Z51.5    S/P TIPS (transjugular intrahepatic portosystemic shunt) X17.901    Acute metabolic encephalopathy P68.47    Lezama's esophagus with dysplasia K22.719    Mastoid disorder, bilateral H74.93    Acute deep vein thrombosis (DVT) of brachial vein of right upper extremity (HCC) I82.621    Chronic hepatitis C without hepatic coma (HCC) B18.2    Swelling of joint of upper arm, right M25.421    Anasarca R60.1    Lightheadedness R42    Alcohol withdrawal syndrome, with delirium (HCC) F10.931    Hemorrhage of rectum and anus K62.5    Bowel perforation (HCC) K63.1       Pain: Pt did not c/o pain    Cognitive Treatment    Treatment time: 1:51 - 2:17      Subjective: [x] Alert [x] Cooperative     [] Confused     [] Agitated    [] Lethargic      Objective/Assessment:    Word Retrieval: Providing Descriptor Words of Objects: 31/41 increased to 33/41 with min to mod verbal cues    Recall: Immediate Recall: 4/4, 4/4, 4/4, 5/5, 4/5 increased to 5/5 with 1 repetition  Delayed Recall: 0/3 increased to 3/3 with max verbal cues    Other: In prior sessions the Pt had reported feeling confused and experiencing hallucinations.   Today the Pt
Cholelithiasis but no acute cholecystitis. 6. Redemonstration of stable splenic artery aneurysm measuring 2.4 x 3.1 cm. 7. Moderate right and small left bilateral pleural effusions and lower lobe atelectatic changes. Moderate pericardial effusion redemonstrated. Findings discussed with Dr. Zaida Haines, on 03/25/2023 at 7:30 p.m. RECOMMENDATIONS: STAT surgical consult for bowel perforation and infarction. XR CHEST PORTABLE    Result Date: 3/26/2023  Stable chest.     XR CHEST PORTABLE    Result Date: 3/26/2023  No acute process. Devices as above. ASSESSMENT:  Active Hospital Problems    Diagnosis Date Noted    Bowel perforation [facility]) [K63.1] 03/25/2023       60 y.o. male POD #2 from exploratory laparotomy with right collection and ileostomy creation. Plan:  Neuro:   -Pain and nausea control as needed. CV:   -Hemodynamically stable. -Off vasopressor support.   -Home meds: atorvastatin  Heme:  -Hgb: 7.6<7.8  -Received 1U PRBC and 1U FFP over past 24 hrs   Pulm:   Extubated yesterday. Saturating 96% on room air  GI:   -Diet: NPO. Ok for sips and chips for comfort  -Await bowel function  PPX: famotidine  Renal:  -Voiding with good uop.   -Monitor I/O's.   -Replete electrolytes PRN. ID:   -Afebrile. -Monitor WBC: 9.4<7.1  MSK:   -PT/OT. -WB status: Up with assistance     Dispo: Per ICU. Potential candidate for transfer to stepdown. Electronically signed by Callie England  on 3/27/2023 at 5:32 AM     Addendum:    Patient seen and examined with Medical Student and Surgical Team.  Agree with assessment and plan with changes made accordingly. Extubated yesterday. Transfused 1 unit PRBC for hgb 6.7. Hgb 7.6 this AM. Potential transfer to stepdown.      uRbén Campa DO  PGY-1 General Surgery  03/27/23  6:50 AM
[x] Continue ST services    [] Discharge from ST:      Discharge recommendations: []  Further therapy recommended at discharge. The patient should be able to tolerate at least 3 hours of therapy per day over 5 days or 15 hours over 7 days. [x] Further therapy recommended at discharge. [] No therapy recommended at discharge. Treatment completed by: Completed by:  Marlyn Galvan  Clinician    Cosigned By: Ludwig Landau. A. CCC/SLP
abdominal and pelvic CT and MRI, part 2: white paper of the ACR Incidental Findings Committee II on vascular findings. [address] 0996;78:[id number]. XR CHEST PORTABLE    Result Date: 3/31/2023  1. Interval improved aeration of the left lung base and apparent decrease in the left effusion. 2.  Grossly unchanged appearance of pneumoperitoneum. ASSESSMENT:  Active Hospital Problems    Diagnosis Date Noted    Bowel perforation [facility]) [K63.1] 03/25/2023       60 y.o. male s/p exploratory laparotomy with right colectomy and ileostomy creation. Plan:  Neuro:   -Pain and nausea control as needed. CV:   -Mildly tachycardic, will monitor  -Home meds: atorvastatin  Heme:  -Hgb stable  Pulm:   encourage incentive spirometry, coughing, deep breathing   GI:   -Diet: Regular   -Carafate   -Marinol for appetite promotion  -Monitor ostomy output  Renal:  -Voiding with good uop.   -Monitor I/O's.   -Replete electrolytes PRN. ID:   -Afebrile.    -Monitor WBC, stable at 15  -CT negative for any concerning intraabdominal process  MSK:  -WB status: Up with assistance encouraged, pt reluctant to get out of bed    Dispo planning       Electronically signed by Cheryl Brantley DO  on 4/3/2023 at 9:37 AM
articulation: moderately unintelligible. Pt presents with O/M deficits at this time characterized by decreased movements when asked to smile. The smile was incoordinated and asymmetrical: movement was not observed on the L side, movements were quick and not able to be prolonged. Labial pucker and lingual movements WFL. Pt's evaluation was highly effected by the Pt's severe hearing loss and decreased intelligibility. The Pt may have word retrieval deficits: Pt reported that he is able to complete tasks but not put the steps into words. ST to follow up and provide treatment to address noted deficits. Education provided. Recommendations:  Recommendations  Requires SLP Intervention: Yes  Patient Education: Yes  Patient Education Response: Verbalizes understanding   Frequency: 3-5x week          Plan:   Speech Therapy Prognosis  Prognosis: Fair    Goals:  Short Term Goals  Goal 1: Pt will recall 3-5 units with and without distraction with 90% accuracy  Goal 2: Pt will utilize memory compensatory strategies to aid in recall  Goal 3: Pt will complete problem solving tasks with 90% accuracy  Goal 4: Pt will complete convergent thinking tasks with 90% accuracy  Goal 5: Pt will complete safety/judgement tasks with 90% accuracy  Goal 6: Pt will complete OMEX for facial weakness 10-20x a session   Patient/family involved in developing goals and treatment plan: Yes    Subjective:     Social/Functional History  Lives With: Alone  Active : Yes  Occupation: Retired  Hearing  Hearing Exceptions: Hard of hearing/hearing concerns; No hearing aid           Objective:       Oral Motor   Labial:  (Observed during smile: asymmetrical movement during smile, overall decreased labial excursion as well as no movement on the L side. Pucker WFL.)  Lingual: No impairment    Motor Speech  Dysarthric Characteristics: Imprecise;Blended word boundaries; Decreased breath support  Intelligibility: Impaired  Overall Impairment
changes. There is concern for bowel infarction of the right colon. This is also associated with large amount of free air possibly related to bowel perforation. Pelvis: Urinary bladder, prostate and seminal vesicles appear stable. No evidence of significant lymphadenopathy. Peritoneum/Retroperitoneum: No evidence of retroperitoneal lymphadenopathy. Large amount of free air in the anterior abdomen. Mesenteric inflammatory changes. Small amount of ascites. Bones/Soft Tissues: Severe multilevel degenerative disc disease. No evidence of focal destructive changes. T12 compression injury remains stable. 1. Alarming signs of bowel infarction involving the right colon with extensive pneumatosis and thickening. 2. Large amount of free air in the anterior abdomen. Bowel perforation is suspected. 3. Inflammatory changes in the mesentery and mild ascites. 4. Severe liver cirrhosis and TIPS shunt which is patent. 5. Cholelithiasis but no acute cholecystitis. 6. Redemonstration of stable splenic artery aneurysm measuring 2.4 x 3.1 cm. 7. Moderate right and small left bilateral pleural effusions and lower lobe atelectatic changes. Moderate pericardial effusion redemonstrated. Findings discussed with Dr. Kenisha Winters, on 03/25/2023 at 7:30 p.m. RECOMMENDATIONS: STAT surgical consult for bowel perforation and infarction. XR CHEST PORTABLE    Result Date: 3/26/2023  EXAMINATION: ONE XRAY VIEW OF THE CHEST 3/26/2023 5:53 am COMPARISON: 03/26/2023 HISTORY: ORDERING SYSTEM PROVIDED HISTORY: Intubated TECHNOLOGIST PROVIDED HISTORY: Intubated FINDINGS: Cardiomediastinal silhouette is stable. Similar position of devices. The lungs are clear. No pleural effusion or pneumothorax. Healing left rib fractures.      Stable chest.     XR CHEST PORTABLE    Result Date: 3/26/2023  EXAMINATION: ONE XRAY VIEW OF THE CHEST 3/26/2023 12:50 am COMPARISON: 03/16/2023 HISTORY: ORDERING SYSTEM PROVIDED HISTORY: Post op TECHNOLOGIST
depending on comorbidities and life expectancy) Reference:  Marybel et al.  Managing incidental findings on abdominal and pelvic CT and MRI, part 2: white paper of the ACR Incidental Findings Committee II on vascular findings. [address] 1763;31:[id number]. XR CHEST PORTABLE    Result Date: 3/31/2023  1. Interval improved aeration of the left lung base and apparent decrease in the left effusion. 2.  Grossly unchanged appearance of pneumoperitoneum. ASSESSMENT:  Active Hospital Problems    Diagnosis Date Noted    Bowel perforation [facility]) [K63.1] 03/25/2023       60 y.o. male POD #6 from exploratory laparotomy with right colectomy and ileostomy creation. Plan:  Neuro:   -Pain and nausea control as needed. CV:   -Hemodynamically stable.   -Home meds: atorvastatin  Heme:  -Hgb stable  Pulm:   Extubated 3/26. Saturating 96% on RA. Encourage incentive spirometry and coughing   GI:   -Diet: Regular   -add Carafate   -Adding Marinol for appetite promotion  -Monitor ostomy output  Renal:  -Voiding with good uop.   -Monitor I/O's.   -Replete electrolytes PRN. ID:   -Afebrile.    -Monitor WBC  -CT negative for any concerning intraabdominal process  MSK:  -WB status: Up with assistance encouraged, pt reluctant to get out of bed       Electronically signed by Evelia Valle DO  on 4/2/2023 at 6:13 AM
setting to address deficits  Requires PT Follow-Up: Yes  Activity Tolerance  Activity Tolerance: Patient tolerated treatment well;Patient limited by pain     Plan   Physcial Therapy Plan  General Plan:  (5-6x week)  Specific Instructions for Next Treatment: increase functional mobility  Current Treatment Recommendations: Strengthening, ROM, Balance training, Functional mobility training, Transfer training, Gait training, Endurance training, Patient/Caregiver education & training, Safety education & training, Therapeutic activities, Equipment evaluation, education, & procurement, Positioning, Home exercise program, Stair training  Safety Devices  Type of Devices: All fall risk precautions in place, Bed alarm in place, Call light within reach, Gait belt, Nurse notified, Left in bed  Restraints  Restraints Initially in Place: No     Restrictions  Restrictions/Precautions  Restrictions/Precautions: Fall Risk, Bed Alarm  Required Braces or Orthoses?: No  Position Activity Restriction  Other position/activity restrictions: ambulate pt; s/p ex-lap with ileostomy 3/25, extubated 3/26     Subjective   Pain: reports pain in abdomen, 6/10  General  Chart Reviewed: Yes  Patient assessed for rehabilitation services?: Yes  Follows Commands: Within Functional Limits  Subjective  Subjective: pt in bed and is agreeable to therapy.  RN agreeable to therapy         Social/Functional History  Social/Functional History  Lives With: Alone (Dog)  Type of Home: House  Home Layout: One level  Home Access: Stairs to enter without rails  Entrance Stairs - Number of Steps: 1  Bathroom Shower/Tub: Tub/Shower unit  Bathroom Toilet: Standard  Bathroom Equipment: None  Home Equipment: negar Meredith (does not use at baseline)  ADL Assistance: [address]: Independent  Homemaking Responsibilities: Yes  Meal Prep Responsibility: Primary  Laundry Responsibility: Primary  Cleaning Responsibility: Primary  Shopping
task.  Transfers  Sit to Stand: Minimal Assistance  Stand to Sit: Minimal Assistance  Comment: transfers performed with RW, verbal cues for UE placement with fair return  Ambulation  Surface: Level tile  Device: Rolling Walker  Assistance: Minimal assistance  Quality of Gait: unsteady, removes BUE from RW to reach for items, cues for safety  Gait Deviations: Slow Shannon;Decreased step length;Decreased step height;Shuffles  Distance: 8 ft to chair  Comments: recommend chair follow for further ambulation  More Ambulation?: No  Stairs/Curb  Stairs?: No     Balance  Posture: Fair  Sitting - Static: Fair;+  Sitting - Dynamic: Fair  Standing - Static: Fair  Standing - Dynamic: Fair;-  Comments: standing balance assessed with RW  A/AROM Exercises: Seated BLE ex's with visual/verbal cues for techninque. Reps: 10 each. AM-PAC Score  AM-PAC Inpatient Mobility Raw Score : 17 (03/28/23 1702)  AM-PAC Inpatient T-Scale Score : 42.13 (03/28/23 1702)  Mobility Inpatient CMS 0-100% Score: 50.57 (03/28/23 1702)  Mobility Inpatient CMS G-Code Modifier : CK (03/28/23 1702)     Goals  Short Term Goals  Time Frame for Short Term Goals: 14 visits  Short Term Goal 1: Perform bed mobility and functional transfers independently  Short Term Goal 2: Ambulate 300ft with least restrictive AD and supervision  Short Term Goal 3: Demo Good- dynamic standing balance to decrease risk of falls  Short Term Goal 4: Participate in 30 minutes of therapy to demo increased endurance  Short Term Goal 5: Ascend/descend 1 step without HR and SBA       Education  Patient Education  Education Given To: Patient  Education Provided: Role of Therapy;Plan of Care;Precautions; Home Exercise Program  Education Method: Verbal  Barriers to Learning: Cognition; Hearing  Education Outcome: Continued education needed      Therapy Time   Individual Concurrent Group Co-treatment   Time In 1116         Time Out 1144         Minutes 28         Timed Code Treatment Minutes:
vascular findings. [address] [facility] 8698;20:[id number]. XR CHEST PORTABLE    Result Date: 3/31/2023  1. Interval improved aeration of the left lung base and apparent decrease in the left effusion. 2.  Grossly unchanged appearance of pneumoperitoneum. ASSESSMENT:  Active Hospital Problems    Diagnosis Date Noted    Bowel perforation [facility]) [K63.1] 03/25/2023       60 y.o. male s/p exploratory laparotomy with right colectomy and ileostomy creation. Plan:  Neuro:   -Pain and nausea control as needed. CV:   -Mildly tachycardic, will monitor  -Home meds: atorvastatin  Heme:  -Hgb stable  Pulm:   encourage incentive spirometry, coughing, deep breathing   GI:   -Diet: Regular   -Carafate   -Marinol for appetite promotion  -Monitor ostomy output  Renal:  -Voiding with good uop.   -Monitor I/O's.   -Replete electrolytes PRN. ID:   -Afebrile. -Monitor WBC, stable at 12  -CT negative for any concerning intraabdominal process  MSK:  -WB status: Up with assistance encouraged, pt reluctant to get out of bed    Patient is improving and he is medically stabilized for discharge today. Per patient, he intends to go home with health care, patient is excepted by Melani Robin for home care.   Plan for discharge today       Electronically signed by Mandeep Olguin DO  on 4/4/2023 at 6:00 AM
from 14.1  MSK:   -PT/OT. -WB status: Bedrest    Dispo: To remain in ICU.         Electronically signed by Taina ResendizerDO  on 3/26/2023 at 4:27 AM
placement FINDINGS: Cardiomediastinal silhouette is stable. Endotracheal tube tip projects at the thoracic inlet. Left central venous catheter tip projects at the low SVC. Enteric tube tip and side hole course below the diaphragm. No new focal consolidation or pulmonary edema. No pleural effusion or pneumothorax. Healing left rib fractures. No acute process. Devices as above. ASSESSMENT:  Active Hospital Problems    Diagnosis Date Noted    Bowel perforation [facility]) [K63.1] 03/25/2023       60 y.o. male POD #5 from exploratory laparotomy with right colectomy and ileostomy creation. Plan:  Neuro:   -Pain and nausea control as needed. CV:   -Hemodynamically stable.   -Home meds: atorvastatin  Heme:  -Hgb: awaiting AM labs  Pulm:   Extubated 3/26. Saturating 94% on 4L. Encourage incentive spirometry and coughing   GI:   -Diet: Regular  -Monitor ostomy output  Renal:  -Voiding with good uop.   -Monitor I/O's.   -Replete electrolytes PRN. ID:   -Afebrile. -Monitor WBC- await AM labs  MSK:   -PT/OT - declined yesterday   -WB status: Up with assistance      Electronically signed by Dorinda Ortez  on 3/30/2023 at 5:29 AM      Addendum:    Patient seen and examined with Medical Student and Surgical Team.  Agree with assessment and plan with changes made accordingly. CT PE negative for PE. Persistent tachycardia. Encourage ambulation and pulmonary toilet.      Sueann Denver, DO  PGY-1 General Surgery  03/30/23  6:39 AM
reduce the radiation dose to as low as reasonably achievable. COMPARISON: March 24, 2023 HISTORY: ORDERING SYSTEM PROVIDED HISTORY: severe abdominal pain; liver disease with splenic artery aneursym TECHNOLOGIST PROVIDED HISTORY: severe abdominal pain; liver disease with splenic artery aneursym Reason for Exam: severe abdominal pain; liver disease with splenic artery aneursym Additional signs and symptoms: pt c/o increas abd pain and bloating, nauseaand emisis today Relevant Medical/Surgical History: hx cirrhoisis, gallstones, esophageal ca, barrets esophagus, tips FINDINGS: Lower Chest: Moderate right and small left pleural effusion and lower lobe atelectatic changes. Moderate pericardial effusion. These have not changed since the previous evaluation. Organs: Liver demonstrates severe cirrhosis. A TIPS shunt seen in position and appears patent. Gallbladder demonstrates stones but no evidence of thickening. Pancreas, spleen, adrenals, kidneys, aorta, and IVC appear stable. Aorta is calcified but nonaneurysmal.  Celiac and SMA appear patent. Redemonstration of splenic artery aneurysm which currently measures 3.1 x 2.4 cm and appears stable. No evidence of rupture. GI/Bowel: There is evidence of pneumatosis of the right colon as well as dilation of large bowel loops and inflammatory changes. There is concern for bowel infarction of the right colon. This is also associated with large amount of free air possibly related to bowel perforation. Pelvis: Urinary bladder, prostate and seminal vesicles appear stable. No evidence of significant lymphadenopathy. Peritoneum/Retroperitoneum: No evidence of retroperitoneal lymphadenopathy. Large amount of free air in the anterior abdomen. Mesenteric inflammatory changes. Small amount of ascites. Bones/Soft Tissues: Severe multilevel degenerative disc disease. No evidence of focal destructive changes. T12 compression injury remains stable.      1. Alarming signs of bowel
NATANAEL Farias/L

## 2023-04-04 NOTE — PLAN OF CARE
Ostomy appliance order:  Surgeon:  Dr. Jose Kearns  Phone: [phone]  Fax: [phone]    Type of Surgery: Right hemicolectomy withy end ileostomy  Type of stoma:  ileostomy  ICD-10-CM:  Z93.2    Brand:  Coloplast  Product Number: [id number]  Product Description: SenSura [place] Convex Light MAXI Drainable Pouch  Hcpcs: [id number]  Amount per month: 20 per month     Brand:  Coloplast  Product Number: [id number]  Product Description: Lorella Griffins Protective seal  Hcpcs:   Amount per month: 20 per month     Brand:  Coloplast  Product Number: [id number]  Product Description: Lorella Griffins Skin Barrier wipes  Hcpcs:   Amount per month: I box per month     Brand:  Coloplast  Product Number: [id number]  Product Description: Brava adhesive remover wipes  Hcpcs:   Amount per month: I box per month

## 2023-04-04 NOTE — DISCHARGE INSTR - COC
active    Resolved    C-diff Rule Out 23 Clostridium Difficile Toxin/Antigen (Ordered)   23 Lora Isaac RN    Order d/c'd    SSMEN-78 (Rule Out) 22 COVID-19 & Influenza Combo (Ordered)   22 Rule-Out Test Resulted    COVID-19 (Rule Out) 22 COVID-19, Rapid (Ordered)   22 Rule-Out Test Resulted    COVID-19 (Rule Out) 22 COVID-19 & Influenza Combo (Ordered)   22 Rule-Out Test Resulted    COVID-19 21 COVID-19   21 Infection     COVID-19 (Rule Out) 21 COVID-19 (Ordered)   21 Rule-Out Test Resulted    COVID-19 (Rule Out) 20 COVID-19 (Ordered)   20 Rule-Out Test Resulted    COVID-19 (Rule Out) 20 COVID-19 (Ordered)   20 Rule-Out Test Resulted            Nurse Assessment:  Last Vital Signs: BP (!) 117/58   Pulse (!) 11   Temp 98.5 °F (36.9 °C) (Oral)   Resp 20   Wt 183 lb 10.3 oz (83.3 kg)   SpO2 94%   BMI 26.35 kg/m²     Last documented pain score (0-10 scale): Pain Level: 7  Last Weight:   Wt Readings from Last 1 Encounters:   23 183 lb 10.3 oz (83.3 kg)     Mental Status:  oriented and alert    IV Access:  - None    Nursing Mobility/ADLs:  Walking   Independent  Transfer  Independent  Bathing  Independent  Dressing  Independent  Toileting  Independent  Feeding  Independent  Med Admin  Independent  Med Delivery   none    Wound Care Documentation and Therapy:  Incision 23 Abdomen Medial;Upper (Active)   Dressing Status Clean; Intact;Dry;New drainage noted 23 1123   Incision Cleansed Soap and water 23 0400   Dressing/Treatment ABD pad 23 0400   Closure Staples 23 1123   Margins Other (Comment) 23 1123   Incision Assessment Other (Comment) 23 0400   Drainage Amount Small 23 1123   Drainage Description Serosanguinous

## 2023-04-04 NOTE — PLAN OF CARE
Problem: Safety - Adult  Goal: Free from fall injury  4/4/2023 1312 by Juan Manuel RN  Outcome: Completed  Flowsheets (Taken 4/2/2023 1930 by Nitesh Baca RN)  Free From Fall Injury: Instruct family/caregiver on patient safety  4/4/2023 0335 by Nitesh Baca RN  Outcome: Progressing  4/4/2023 0335 by Nitesh Baca RN  Outcome: Progressing     Problem: Discharge Planning  Goal: Discharge to home or other facility with appropriate resources  4/4/2023 1312 by Juan Manuel RN  Outcome: Completed  Flowsheets (Taken 3/30/2023 0715 by Thad Gonzalez RN)  Discharge to home or other facility with appropriate resources: Identify barriers to discharge with patient and caregiver  4/4/2023 0335 by Nitesh Baca RN  Outcome: Progressing  4/4/2023 0335 by Nitesh Baca RN  Outcome: Progressing     Problem: Pain  Goal: Verbalizes/displays adequate comfort level or baseline comfort level  4/4/2023 1312 by Juan Manuel RN  Outcome: Completed  Flowsheets (Taken 4/4/2023 1312)  Verbalizes/displays adequate comfort level or baseline comfort level:   Administer analgesics based on type and severity of pain and evaluate response   Encourage patient to monitor pain and request assistance   Assess pain using appropriate pain scale  4/4/2023 0335 by Nitesh Baca RN  Outcome: Progressing  4/4/2023 0335 by Nitesh Baca RN  Outcome: Progressing     Problem: Respiratory - Adult  Goal: Achieves optimal ventilation and oxygenation  4/4/2023 1312 by Juan Manuel RN  Outcome: Completed  Flowsheets (Taken 4/2/2023 2000 by Nitesh Baca RN)  Achieves optimal ventilation and oxygenation: Assess for changes in respiratory status  4/4/2023 0335 by Nitesh Baca RN  Outcome: Progressing  4/4/2023 0335 by Nitesh Baca RN  Outcome: Progressing     Problem: Skin/Tissue Integrity  Goal: Absence of new skin breakdown  Description: 1. Monitor for areas of redness and/or skin breakdown  2.

## 2023-04-05 ENCOUNTER — CARE COORDINATION (OUTPATIENT)
Dept: CASE MANAGEMENT | Age: 63
End: 2023-04-05

## 2023-04-05 ENCOUNTER — TELEPHONE (OUTPATIENT)
Dept: PRIMARY CARE CLINIC | Age: 63
End: 2023-04-05

## 2023-04-05 DIAGNOSIS — K70.30 ALCOHOLIC CIRRHOSIS, UNSPECIFIED WHETHER ASCITES PRESENT (HCC): ICD-10-CM

## 2023-04-05 DIAGNOSIS — K63.1 BOWEL PERFORATION (HCC): Primary | ICD-10-CM

## 2023-04-05 PROCEDURE — 1111F DSCHRG MED/CURRENT MED MERGE: CPT | Performed by: PHYSICIAN ASSISTANT

## 2023-04-05 NOTE — TELEPHONE ENCOUNTER
----- Message from Chris Alba RN sent at 4/5/2023 10:21 AM EDT -----  Pt will need 7 day hospital follow up appointment,  Was discharged from \[facility]\"" on 4/4/23 for bowel perforation  Thank you

## 2023-04-05 NOTE — CARE COORDINATION
[facility] Care Transitions Initial Follow Up Call    Call within 2 business days of discharge: Yes    Patient Current Location:  Home: [address] [address]    Care Transition Nurse contacted the patient and family by telephone to perform post hospital discharge assessment. Verified name and  with patient and family as identifiers. Provided introduction to self, and explanation of the Care Transition Nurse role. Patient: Edyta Fraser Patient : 1960   MRN: [id number]  Reason for Admission: Bowel perforation  Discharge Date: 23 RARS: Readmission Risk Score: 32.2      Last Discharge  Street       Date Complaint Diagnosis Description Type Department Provider    3/25/23  Bowel perforation [facility]) Admission (Discharged) [facility] Sushila Abdi, DO            Was this an external facility discharge? No Discharge Facility: [facility]    Challenges to be reviewed by the provider   Additional needs identified to be addressed with provider: Yes  Dr Milagro Martin for substitution for Marinol,  GI for refill on Lactulose, PCP for 7 day hospital follow up               Method of communication with provider: phone. Attempted to contact Henrryantolin Mannie, but he did not answer. Call placed to ex wife, Sagar Argueta. She stated that Renard Chand was not doing so well last night. She stated that he was having a lot of pain and did not eat last night. She felt that possibly from all the activity he did when discharged, his pain was increased. She stated that he did not have all his medications. She said that he needs refill on his lactulose and Frank did not get the Marinol. She said that the pharmacist said that the insurance would no cover it and that he would try to get a substitution for it. Call placed to Renard Chand and he said that he went to change is ileostomy appliance and he was unable to get it on correctly. He said that he needed someone to help him. Asked why he was changing it and he could not answer.   He denied

## 2023-04-06 ENCOUNTER — APPOINTMENT (OUTPATIENT)
Dept: CT IMAGING | Age: 63
End: 2023-04-06
Payer: MEDICARE

## 2023-04-06 ENCOUNTER — HOSPITAL ENCOUNTER (EMERGENCY)
Age: 63
Discharge: HOME | End: 2023-04-06
Attending: EMERGENCY MEDICINE
Payer: MEDICARE

## 2023-04-06 VITALS
HEART RATE: 109 BPM | DIASTOLIC BLOOD PRESSURE: 68 MMHG | SYSTOLIC BLOOD PRESSURE: 119 MMHG | TEMPERATURE: 97.7 F | RESPIRATION RATE: 20 BRPM | BODY MASS INDEX: 25.05 KG/M2 | HEIGHT: 70 IN | WEIGHT: 175 LBS | OXYGEN SATURATION: 100 %

## 2023-04-06 DIAGNOSIS — G89.18 POST-OP PAIN: ICD-10-CM

## 2023-04-06 DIAGNOSIS — Z43.2 ILEOSTOMY CARE (HCC): ICD-10-CM

## 2023-04-06 DIAGNOSIS — T81.31XA POSTOPERATIVE WOUND DEHISCENCE, INITIAL ENCOUNTER: Primary | ICD-10-CM

## 2023-04-06 LAB
ABSOLUTE EOS #: 0 K/UL (ref 0–0.4)
ABSOLUTE LYMPH #: 0.62 K/UL (ref 1–4.8)
ABSOLUTE MONO #: 0.9 K/UL (ref 0.1–1.3)
ALBUMIN SERPL-MCNC: 2.3 G/DL (ref 3.5–5.2)
ALP SERPL-CCNC: 131 U/L (ref 40–129)
ALT SERPL-CCNC: 13 U/L (ref 5–41)
ANION GAP SERPL CALCULATED.3IONS-SCNC: 13 MMOL/L (ref 9–17)
AST SERPL-CCNC: 34 U/L
BASOPHILS # BLD: 0 % (ref 0–2)
BASOPHILS ABSOLUTE: 0 K/UL (ref 0–0.2)
BILIRUB SERPL-MCNC: 1.2 MG/DL (ref 0.3–1.2)
BUN SERPL-MCNC: 9 MG/DL (ref 8–23)
CALCIUM SERPL-MCNC: 8.4 MG/DL (ref 8.6–10.4)
CHLORIDE SERPL-SCNC: 98 MMOL/L (ref 98–107)
CO2 SERPL-SCNC: 22 MMOL/L (ref 20–31)
CREAT SERPL-MCNC: 0.71 MG/DL (ref 0.7–1.2)
EOSINOPHILS RELATIVE PERCENT: 0 % (ref 0–4)
GFR SERPL CREATININE-BSD FRML MDRD: >60 ML/MIN/1.73M2
GLUCOSE SERPL-MCNC: 96 MG/DL (ref 70–99)
HCT VFR BLD AUTO: 27.6 % (ref 41–53)
HGB BLD-MCNC: 8.4 G/DL (ref 13.5–17.5)
INR PPP: 1.5
LACTIC ACID, SEPSIS: 1.8 MMOL/L (ref 0.5–1.9)
LACTIC ACID, SEPSIS: 2.7 MMOL/L (ref 0.5–1.9)
LIPASE SERPL-CCNC: 9 U/L (ref 13–60)
LYMPHOCYTES # BLD: 9 % (ref 24–44)
MCH RBC QN AUTO: 24 PG (ref 26–34)
MCHC RBC AUTO-ENTMCNC: 30.4 G/DL (ref 31–37)
MCV RBC AUTO: 78.9 FL (ref 80–100)
MONOCYTES # BLD: 13 % (ref 1–7)
MORPHOLOGY: ABNORMAL
PDW BLD-RTO: 22.7 % (ref 11.5–14.9)
PLATELET # BLD AUTO: 301 K/UL (ref 150–450)
PMV BLD AUTO: 7.2 FL (ref 6–12)
POTASSIUM SERPL-SCNC: 4.2 MMOL/L (ref 3.7–5.3)
PROCALCITONIN SERPL-MCNC: 0.14 NG/ML
PROT SERPL-MCNC: 5.7 G/DL (ref 6.4–8.3)
PROTHROMBIN TIME: 18.5 SEC (ref 11.8–14.6)
RBC # BLD: 3.5 M/UL (ref 4.5–5.9)
SEG NEUTROPHILS: 78 % (ref 36–66)
SEGMENTED NEUTROPHILS ABSOLUTE COUNT: 5.38 K/UL (ref 1.3–9.1)
SODIUM SERPL-SCNC: 133 MMOL/L (ref 135–144)
WBC # BLD AUTO: 6.9 K/UL (ref 3.5–11)

## 2023-04-06 PROCEDURE — 84145 PROCALCITONIN (PCT): CPT

## 2023-04-06 PROCEDURE — 2580000003 HC RX 258: Performed by: EMERGENCY MEDICINE

## 2023-04-06 PROCEDURE — 6360000002 HC RX W HCPCS: Performed by: PHYSICIAN ASSISTANT

## 2023-04-06 PROCEDURE — 36415 COLL VENOUS BLD VENIPUNCTURE: CPT

## 2023-04-06 PROCEDURE — 87040 BLOOD CULTURE FOR BACTERIA: CPT

## 2023-04-06 PROCEDURE — 99285 EMERGENCY DEPT VISIT HI MDM: CPT

## 2023-04-06 PROCEDURE — 74177 CT ABD & PELVIS W/CONTRAST: CPT

## 2023-04-06 PROCEDURE — 83605 ASSAY OF LACTIC ACID: CPT

## 2023-04-06 PROCEDURE — 6360000004 HC RX CONTRAST MEDICATION: Performed by: EMERGENCY MEDICINE

## 2023-04-06 PROCEDURE — 80053 COMPREHEN METABOLIC PANEL: CPT

## 2023-04-06 PROCEDURE — 93005 ELECTROCARDIOGRAM TRACING: CPT | Performed by: EMERGENCY MEDICINE

## 2023-04-06 PROCEDURE — 96361 HYDRATE IV INFUSION ADD-ON: CPT

## 2023-04-06 PROCEDURE — 85610 PROTHROMBIN TIME: CPT

## 2023-04-06 PROCEDURE — 96360 HYDRATION IV INFUSION INIT: CPT

## 2023-04-06 PROCEDURE — 85025 COMPLETE CBC W/AUTO DIFF WBC: CPT

## 2023-04-06 PROCEDURE — 83690 ASSAY OF LIPASE: CPT

## 2023-04-06 RX ORDER — HEPARIN SODIUM (PORCINE) LOCK FLUSH IV SOLN 100 UNIT/ML 100 UNIT/ML
300 SOLUTION INTRAVENOUS ONCE
Status: COMPLETED | OUTPATIENT
Start: 2023-04-06 | End: 2023-04-06

## 2023-04-06 RX ORDER — 0.9 % SODIUM CHLORIDE 0.9 %
100 INTRAVENOUS SOLUTION INTRAVENOUS ONCE
Status: COMPLETED | OUTPATIENT
Start: 2023-04-06 | End: 2023-04-06

## 2023-04-06 RX ORDER — SODIUM CHLORIDE 9 MG/ML
30 INJECTION, SOLUTION INTRAVENOUS ONCE
Status: COMPLETED | OUTPATIENT
Start: 2023-04-06 | End: 2023-04-06

## 2023-04-06 RX ORDER — SODIUM CHLORIDE 0.9 % (FLUSH) 0.9 %
10 SYRINGE (ML) INJECTION AS NEEDED
Status: DISCONTINUED | OUTPATIENT
Start: 2023-04-06 | End: 2023-04-06 | Stop reason: HOSPADM

## 2023-04-06 RX ORDER — LACTULOSE 10 G/15ML
SOLUTION ORAL
Qty: 473 ML | Refills: 1 | Status: SHIPPED | OUTPATIENT
Start: 2023-04-06

## 2023-04-06 RX ADMIN — HEPARIN 300 UNITS: 100 SYRINGE at 21:18

## 2023-04-06 RX ADMIN — SODIUM CHLORIDE 30 ML/KG/HR: 9 INJECTION, SOLUTION INTRAVENOUS at 16:37

## 2023-04-06 RX ADMIN — IOPAMIDOL 75 ML: 755 INJECTION, SOLUTION INTRAVENOUS at 17:26

## 2023-04-06 RX ADMIN — SODIUM CHLORIDE 100 ML: 9 INJECTION, SOLUTION INTRAVENOUS at 17:30

## 2023-04-06 RX ADMIN — SODIUM CHLORIDE, PRESERVATIVE FREE 10 ML: 5 INJECTION INTRAVENOUS at 17:30

## 2023-04-06 ASSESSMENT — PAIN - FUNCTIONAL ASSESSMENT: PAIN_FUNCTIONAL_ASSESSMENT: 0-10

## 2023-04-06 ASSESSMENT — ENCOUNTER SYMPTOMS
SHORTNESS OF BREATH: 0
ABDOMINAL PAIN: 1
NAUSEA: 0
VOMITING: 0
COUGH: 0

## 2023-04-06 ASSESSMENT — PAIN SCALES - GENERAL: PAINLEVEL_OUTOF10: 8

## 2023-04-06 NOTE — ED NOTES
Pt came to ER with no colostomy bag on stoma. Pt stoma site is red approx 1.5 inches around stoma. Pt reported pain with cleaning the site. Stoma is red and moist. Writer placed a colostomy bag over stoma. Colostomy bag had to be cut on the right side to avoid placing directing over open suture site.      Jamaal Sim RN  04/06/23 7304

## 2023-04-06 NOTE — ED PROVIDER NOTES
Goals of care, counseling/discussion Z71.89    ACP (advance care planning) Z71.89    Palliative care encounter Z51.5    S/P TIPS (transjugular intrahepatic portosystemic shunt) X67.635    Acute metabolic encephalopathy P74.04    Lezama's esophagus with dysplasia K22.719    Mastoid disorder, bilateral H74.93    Acute deep vein thrombosis (DVT) of brachial vein of right upper extremity (HCC) I82.621    Chronic hepatitis C without hepatic coma (HCC) B18.2    Swelling of joint of upper arm, right M25.421    Anasarca R60.1    Lightheadedness R42    Alcohol withdrawal syndrome, with delirium (HCC) F10.931    Hemorrhage of rectum and anus K62.5    Bowel perforation (HCC) K63.1     SURGICAL HISTORY       Past Surgical History:   Procedure Laterality Date    BUNIONECTOMY      twice on right side    BUNIONECTOMY Left     CARPAL TUNNEL RELEASE Right     COLONOSCOPY      at age 35    COLONOSCOPY  10/05/2016    polyps-pathology tubular adenoma, and abnormal looking mucosa right colon-pathology-tubular adenoma    COLONOSCOPY N/A 03/30/2018    COLONOSCOPY POLYPECTOMY COLD BIOPSY performed by Fina Hong MD at [address],[facility] [address]  03/30/2018    Small polyp in the sigmoid colon and excised with biopsy forceps--tubular adenoma    COLONOSCOPY N/A 04/16/2022    COLONOSCOPY POLYPECTOMY performed by Fina Hong MD at [facility], DIAGNOSTIC      EGD    ESOPHAGOGASTRODUODENOSCOPY  12/29/2022    ESOPHAGOGASTRODUODENOSCOPY N/A 01/03/2023    IR PORT PLACEMENT EQUAL OR GREATER THAN 5 YEARS  04/19/2021    IR PORT PLACEMENT EQUAL OR GREATER THAN 5 YEARS 4/19/2021 [facility] SPECIAL PROCEDURES    IR TIPS INSERTION  12/01/2022    IR TIPS INSERTION 12/1/2022 Marlin Olmedo MD [facility] SPECIAL PROCEDURES    KNEE SURGERY Left     cyst removed    LAPAROTOMY N/A 3/25/2023    LAPAROTOMY EXPLORATORY, RIGHT COLECTOMY, CREATION IF END ILEOSTOMY performed by Micheline Pardo DO at [facility]
Frequency: 1.0 times per week     Types: Cocaine     Comment: Cocaine- stopped spring 2016          Aguilar Crooks DO  Attending Emergency Physician         Aguilar Crooks DO  04/06/23 0460

## 2023-04-06 NOTE — ED NOTES
Port access at this time - flushes appropriately and draws back blood.  Port access secured with kit - pt tolerates well     Meir Srinivasan RN  04/06/23 Kinnie Cranker

## 2023-04-06 NOTE — DISCHARGE SUMMARY
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 Dinorah Stafford, orthopedic, every 3-4 months, gets steroid injection    Lezama esophagus     Bleeding gastric varices 12/29/2022    BPH (benign prostatic hypertrophy)     Cholelithiasis     Cirrhosis ([address]     COVID-19 12/2020    pt reports he had a positive test while at [facility] in 2020, was asymptomatic    COVID-19 vaccine series completed 5/20/2021, 6/22/2021    Moderna 5/20/2021, 6/22/2021    DDD (degenerative disc disease), lumbar     Depression     Esophageal cancer ([address]     INVASIVE ADENOCARCINOMA ARISING IN TUBULAR ADENOMA WITH HIGH GRADE DYSPLASIA, ASSOCIATED WITH FOCAL INTESTINAL METAPLASIA     Esophageal varices ([address]     Fatty liver     GERD (gastroesophageal reflux disease)     GI bleed     Heart murmur     Hep C w/o coma, chronic ([address]     Hiatal hernia     History of alcohol abuse     6-12 beers a day; quit drinking 2019    History of blood transfusion     History of colon polyps 2016    History of tobacco abuse     [place] (hard of hearing)     Hyperlipidemia     Hypertension     Hyponatremia 07/20/2016    Hypotension 12/20/2021    Mastoid disorder, bilateral 12/31/2022    biLateral mastoid effusion    Pericardial effusion     PONV (postoperative nausea and vomiting)     Poor venous access     has port in place.     Port-A-Cath in place

## 2023-04-06 NOTE — ED NOTES
Attempted to get blood for second lactic from port. Unable to get enough blood for lab.        Beltran Hodge RN  04/06/23 2919

## 2023-04-07 ENCOUNTER — HOSPITAL ENCOUNTER (EMERGENCY)
Age: 63
Discharge: HOME | End: 2023-04-07
Attending: EMERGENCY MEDICINE

## 2023-04-07 ENCOUNTER — CARE COORDINATION (OUTPATIENT)
Dept: CASE MANAGEMENT | Age: 63
End: 2023-04-07

## 2023-04-07 VITALS
TEMPERATURE: 98.7 F | RESPIRATION RATE: 18 BRPM | HEART RATE: 103 BPM | BODY MASS INDEX: 25.05 KG/M2 | HEIGHT: 70 IN | OXYGEN SATURATION: 100 % | DIASTOLIC BLOOD PRESSURE: 66 MMHG | WEIGHT: 175 LBS | SYSTOLIC BLOOD PRESSURE: 121 MMHG

## 2023-04-07 DIAGNOSIS — Z43.2 ILEOSTOMY BAG CHANGED (HCC): Primary | ICD-10-CM

## 2023-04-07 LAB
EKG ATRIAL RATE: 108 BPM
EKG P AXIS: 49 DEGREES
EKG P-R INTERVAL: 184 MS
EKG Q-T INTERVAL: 364 MS
EKG QRS DURATION: 76 MS
EKG QTC CALCULATION (BAZETT): 487 MS
EKG R AXIS: 33 DEGREES
EKG T AXIS: -5 DEGREES
EKG VENTRICULAR RATE: 108 BPM

## 2023-04-07 PROCEDURE — 93010 ELECTROCARDIOGRAM REPORT: CPT | Performed by: INTERNAL MEDICINE

## 2023-04-07 ASSESSMENT — PAIN - FUNCTIONAL ASSESSMENT: PAIN_FUNCTIONAL_ASSESSMENT: NONE - DENIES PAIN

## 2023-04-07 NOTE — CARE COORDINATION
Surgical site cleaned with soap and water and dried. A protective barrier applied on top of incision site closest to the ostomy. RN did place a new ostomy bag for patient. Patient and ex-wife, Benton Flores, educated on how to empty the ostomy bag and to maintain new bag until home health sees patient this upcoming Monday. Patient educated that he will need to call his home health agency or come into the ER if a new bag is needed. Patient did demonstrate how to empty the bag and close the bag at least 4 times with the RN at bedside.
150

## 2023-04-07 NOTE — DISCHARGE INSTRUCTIONS
Please keep incision clean. Return to the ED if having issues with bag or if you develop worsening redness, fever, chills or develop drainage.

## 2023-04-07 NOTE — ED TRIAGE NOTES
Mode of arrival (squad #, walk in, police, etc) : walk-in        Chief complaint(s): colostomy bag        Arrival Note (brief scenario, treatment PTA, etc). : Pt reports taking his colostomy bag off today after it was placed in the ED yesterday. Pt is here for a new colostomy bag.        C= \"Have you ever felt that you should Cut down on your drinking? \"  No  A= \"Have people Annoyed you by criticizing your drinking? \"  No  G= \"Have you ever felt bad or Guilty about your drinking? \"  No  E= \"Have you ever had a drink as an Eye-opener first thing in the morning to steady your nerves or to help a hangover? \"  No      Deferred []      Reason for deferring: N/A    *If yes to two or more: probable alcohol abuse. *

## 2023-04-07 NOTE — DISCHARGE INSTRUCTIONS
Please follow up with Dr. Tristan Jaquez. Return to the ED if you develop worsening redness, wound dehiscence, wound drainage, fever, chills, vomiting, decreased output from ileostomy or if home health nurse does not come.

## 2023-04-07 NOTE — ED PROVIDER NOTES
eMERGENCY dEPARTMENT eNCOUnter   [address] Name: Yaneth Akins  MRN: [id number]  [place] 1960  Date of evaluation: 4/7/23     Yaneth Akins is a 60 y.o. male with CC: Other (Colostomy bag )      Based on the medical record the care appears appropriate. I was personally available for consultation in the Emergency Department.     Lauryn Armendariz DO  Attending Emergency Physician                 Lauryn Armendariz DO  04/07/23 8180
K63.1     SURGICAL HISTORY       Past Surgical History:   Procedure Laterality Date    BUNIONECTOMY      twice on right side    BUNIONECTOMY Left     CARPAL TUNNEL RELEASE Right     COLONOSCOPY      at age 35    COLONOSCOPY  10/05/2016    polyps-pathology tubular adenoma, and abnormal looking mucosa right colon-pathology-tubular adenoma    COLONOSCOPY N/A 03/30/2018    COLONOSCOPY POLYPECTOMY COLD BIOPSY performed by Sariah French MD at [address] [facility] [address] [address]  03/30/2018    Small polyp in the sigmoid colon and excised with biopsy forceps--tubular adenoma    COLONOSCOPY N/A 04/16/2022    COLONOSCOPY POLYPECTOMY performed by Sariah French MD at [facility], COLON, DIAGNOSTIC      EGD    ESOPHAGOGASTRODUODENOSCOPY  12/29/2022    ESOPHAGOGASTRODUODENOSCOPY N/A 01/03/2023    IR PORT PLACEMENT EQUAL OR GREATER THAN 5 YEARS  04/19/2021    IR PORT PLACEMENT EQUAL OR GREATER THAN 5 YEARS 4/19/2021 [facility] SPECIAL PROCEDURES    IR TIPS INSERTION  12/01/2022    IR TIPS INSERTION 12/1/2022 Irene Javed MD [facility] SPECIAL PROCEDURES    KNEE SURGERY Left     cyst removed    LAPAROTOMY N/A 3/25/2023    LAPAROTOMY EXPLORATORY, RIGHT COLECTOMY, CREATION IF END ILEOSTOMY performed by Jocelyn Tyson DO at [facility] Right 11/23/2020    NERVE BLOCK RIGHT CERVICAL STEROID INJECTION  C3-C6 performed by Erasmo Kelsey MD at [facility]  01/04/2016    steroid injection C7 T1    OTHER SURGICAL HISTORY  11/21/2016    Bilateral Lumbar CACHORRO L4-L5 injections    OTHER SURGICAL HISTORY  12/19/2016    lumbar steroid injection    OTHER SURGICAL HISTORY  09/28/2018    BILATERAL L5 CACHORRO (N/A Back)    OTHER SURGICAL HISTORY Right 11/23/2020    cervical injection    PAIN MANAGEMENT PROCEDURE Left 07/09/2020    EPIDURAL STEROID INJECTION LEFT L4 L5 performed by Erasmo Kelsey MD at [address] [place] [address] Left 07/20/2020    LEFT L4 L5 EPIDURAL STEROID

## 2023-04-07 NOTE — CARE COORDINATION
current. Patients top risk factors for readmission: functional physical ability and medical condition-post op abdominal surgery with creation of ileostomy  Interventions to address risk factors: Obtained and reviewed discharge summary and/or continuity of care documents and Communication with home health agencies or other community services the patient is currently using-Go catherine,  attempting to get nurse visit    Offered patient enrollment in the Remote Patient Monitoring (RPM) program for in-home monitoring: NA.     Care Transitions Subsequent and Final Call    Subsequent and Final Calls  Do you have any ongoing symptoms?: Yes  Onset of Patient-reported symptoms: In the past 7 days  Patient-reported symptoms: Pain, Other  Interventions for patient-reported symptoms: Notified Home Care  Have your medications changed?: No  Do you have any questions related to your medications?: No  Do you currently have any active services?: Yes  Are you currently active with any services?: Home Health  Do you have any needs or concerns that I can assist you with?: Yes  Patient-reported Needs or Concerns: needs ileotomy appliance reapplied  Care Transitions Interventions     Other Therapy Services: Completed      Other Services: Completed   Disease Association: Completed Palliative Care: Completed     Other Interventions:             Care Transition Nurse provided contact information for future needs. Plan for follow-up call in 7-10 days based on severity of symptoms and risk factors.   Plan for next call:  follow up on ileostomy and infection to basia,     Donna Ariza RN

## 2023-04-08 ENCOUNTER — HOSPITAL ENCOUNTER (EMERGENCY)
Age: 63
Discharge: HOME | End: 2023-04-08
Attending: EMERGENCY MEDICINE

## 2023-04-08 VITALS
BODY MASS INDEX: 24.34 KG/M2 | DIASTOLIC BLOOD PRESSURE: 60 MMHG | HEART RATE: 114 BPM | WEIGHT: 170 LBS | HEIGHT: 70 IN | OXYGEN SATURATION: 98 % | SYSTOLIC BLOOD PRESSURE: 110 MMHG

## 2023-04-08 DIAGNOSIS — Z43.3 COLOSTOMY CARE (HCC): Primary | ICD-10-CM

## 2023-04-08 ASSESSMENT — PAIN - FUNCTIONAL ASSESSMENT: PAIN_FUNCTIONAL_ASSESSMENT: NONE - DENIES PAIN

## 2023-04-08 NOTE — ED TRIAGE NOTES
Mode of arrival (squad #, walk in, police, etc) : walk-in        Chief complaint(s): needs colostomy bag        Arrival Note (brief scenario, treatment PTA, etc). : Patient reports that the colostomy bag that was on last night fell off while he slept. He has nurses that come into his home but they are not working today to help him with this. He left all materials at home and patient does not know what all he uses to put this on. Patient has had the bag x 1 week after a bowel resection was done. C= \"Have you ever felt that you should Cut down on your drinking? \"  No  A= \"Have people Annoyed you by criticizing your drinking? \"  No  G= \"Have you ever felt bad or Guilty about your drinking? \"  No  E= \"Have you ever had a drink as an Eye-opener first thing in the morning to steady your nerves or to help a hangover? \"  No      Deferred []      Reason for deferring: N/A    *If yes to two or more: probable alcohol abuse. *

## 2023-04-08 NOTE — ED NOTES
This nurse in to replace colostomy bag. This nurse asks patient how the colostomy bag keeps coming off and he states that he was unable to empty the bag due to the Velcro. After cleaning site this writer placed a colostomy bag on patient with stoma paste and barrier ring, this writer makes sure that the patient is able to demonstrate how to open and close the current bag he has on.       China Villanueva, RN  04/08/23 3062 [facility], RN  04/08/23 3248

## 2023-04-09 LAB
MICROORGANISM SPEC CULT: NORMAL
MICROORGANISM SPEC CULT: NORMAL
SERVICE CMNT-IMP: NORMAL
SERVICE CMNT-IMP: NORMAL
SPECIMEN DESCRIPTION: NORMAL
SPECIMEN DESCRIPTION: NORMAL

## 2023-04-09 NOTE — ED PROVIDER NOTES
BLOCK Right 11/23/2020    NERVE BLOCK RIGHT CERVICAL STEROID INJECTION  C3-C6 performed by Titi Bonilla MD at [address] [facility]  01/04/2016    steroid injection C7 T1    OTHER SURGICAL HISTORY  11/21/2016    Bilateral Lumbar CACHORRO L4-L5 injections    OTHER SURGICAL HISTORY  12/19/2016    lumbar steroid injection    OTHER SURGICAL HISTORY  09/28/2018    BILATERAL L5 CACHORRO (N/A Back)    OTHER SURGICAL HISTORY Right 11/23/2020    cervical injection    PAIN MANAGEMENT PROCEDURE Left 07/09/2020    EPIDURAL STEROID INJECTION LEFT L4 L5 performed by Titi Bonilla MD at [facility] Left 07/20/2020    LEFT L4 L5 EPIDURAL STEROID INJECTION performed by Titi Bonilla MD at [facility] Bilateral 08/17/2020    LUMBAR FACET BILATERAL L2-L5 performed by Titi Bonilla MD at [facility] Bilateral 12/07/2020    NERVE BLOCK BILATERAL LUMBAR MEDIAL BRANCH L2-L5 performed by Titi Bonilla MD at [address] [email or web address] &/TRANSPOS MEDIAN NRV CARPAL Lorice Frederick Right 08/29/2017    CARPAL TUNNEL RELEASE RIGHT performed by Caren Phan MD at [address] &/TRANSPOS MEDIAN NRV CARPAL TUNNE Left 10/31/2017    CARPAL TUNNEL RELEASE performed by Caren Phan MD at [facility] [address]&/STRD TFRML EPI LUMBAR/SACRAL 1 LEVEL Bilateral 09/06/2018    BILATERAL L5 CACHORRO performed by Titi Bonilla MD at [facility] [address] AA&/STRD TFRML EPI LUMBAR/SACRAL 1 LEVEL N/A 09/28/2018    BILATERAL L5 CACHORRO performed by Titi Bonilla MD at [address] [place] [address] 12/29/2020    EGD BIOPSY performed by Onel Irving MD at [address] [place] [address] 02/02/2021    EGD BIOPSY and spot marking performed by Gwen Loyd MD at [address] [place] [address] N/A 02/12/2021    ENDOSCOPIC ULTRASOUND, EGD performed by Lee Collins MD at [facility]

## 2023-04-11 ENCOUNTER — HOSPITAL ENCOUNTER (INPATIENT)
Age: 63
LOS: 5 days | Discharge: NURSING, REHAB OR LONG-TERM CARE | DRG: 388 | End: 2023-04-17
Attending: STUDENT IN AN ORGANIZED HEALTH CARE EDUCATION/TRAINING PROGRAM | Admitting: INTERNAL MEDICINE
Payer: COMMERCIAL

## 2023-04-11 DIAGNOSIS — R42 LIGHTHEADEDNESS: Primary | ICD-10-CM

## 2023-04-11 DIAGNOSIS — R11.2 NAUSEA AND VOMITING, UNSPECIFIED VOMITING TYPE: ICD-10-CM

## 2023-04-11 DIAGNOSIS — Z93.3 COLOSTOMY PRESENT (HCC): ICD-10-CM

## 2023-04-11 PROCEDURE — 99285 EMERGENCY DEPT VISIT HI MDM: CPT

## 2023-04-11 PROCEDURE — 96375 TX/PRO/DX INJ NEW DRUG ADDON: CPT

## 2023-04-11 PROCEDURE — 96365 THER/PROPH/DIAG IV INF INIT: CPT

## 2023-04-12 ENCOUNTER — APPOINTMENT (OUTPATIENT)
Dept: CT IMAGING | Age: 63
DRG: 388 | End: 2023-04-12
Payer: COMMERCIAL

## 2023-04-12 ENCOUNTER — APPOINTMENT (OUTPATIENT)
Dept: GENERAL RADIOLOGY | Age: 63
DRG: 388 | End: 2023-04-12
Payer: COMMERCIAL

## 2023-04-12 PROBLEM — R10.10 PAIN OF UPPER ABDOMEN: Status: ACTIVE | Noted: 2023-04-12

## 2023-04-12 PROBLEM — Z93.3 COLOSTOMY PRESENT (HCC): Status: ACTIVE | Noted: 2023-04-12

## 2023-04-12 PROBLEM — E87.1 HYPONATREMIA: Status: ACTIVE | Noted: 2023-04-12

## 2023-04-12 PROBLEM — K56.7 ILEUS (HCC): Status: ACTIVE | Noted: 2023-04-12

## 2023-04-12 PROBLEM — E87.8 ELECTROLYTE IMBALANCE: Status: ACTIVE | Noted: 2023-04-12

## 2023-04-12 LAB
ABSOLUTE EOS #: 0.07 K/UL (ref 0–0.4)
ABSOLUTE LYMPH #: 0.53 K/UL (ref 1–4.8)
ABSOLUTE MONO #: 0.99 K/UL (ref 0.1–1.3)
ALBUMIN SERPL-MCNC: 1.8 G/DL (ref 3.5–5.2)
ALBUMIN SERPL-MCNC: 1.9 G/DL (ref 3.5–5.2)
ALP SERPL-CCNC: 129 U/L (ref 40–129)
ALP SERPL-CCNC: 154 U/L (ref 40–129)
ALT SERPL-CCNC: 10 U/L (ref 5–41)
ALT SERPL-CCNC: 13 U/L (ref 5–41)
ANION GAP SERPL CALCULATED.3IONS-SCNC: 11 MMOL/L (ref 9–17)
ANION GAP SERPL CALCULATED.3IONS-SCNC: 7 MMOL/L (ref 9–17)
AST SERPL-CCNC: 31 U/L
AST SERPL-CCNC: 56 U/L
BASOPHILS # BLD: 1 % (ref 0–2)
BASOPHILS ABSOLUTE: 0.07 K/UL (ref 0–0.2)
BILIRUB SERPL-MCNC: 1.7 MG/DL (ref 0.3–1.2)
BILIRUB SERPL-MCNC: 2 MG/DL (ref 0.3–1.2)
BNP SERPL-MCNC: 522 PG/ML
BUN SERPL-MCNC: 7 MG/DL (ref 8–23)
BUN SERPL-MCNC: 7 MG/DL (ref 8–23)
CALCIUM SERPL-MCNC: 7.6 MG/DL (ref 8.6–10.4)
CALCIUM SERPL-MCNC: 8.1 MG/DL (ref 8.6–10.4)
CHLORIDE SERPL-SCNC: 104 MMOL/L (ref 98–107)
CHLORIDE SERPL-SCNC: 99 MMOL/L (ref 98–107)
CO2 SERPL-SCNC: 19 MMOL/L (ref 20–31)
CO2 SERPL-SCNC: 21 MMOL/L (ref 20–31)
CREAT SERPL-MCNC: 0.43 MG/DL (ref 0.7–1.2)
CREAT SERPL-MCNC: 0.48 MG/DL (ref 0.7–1.2)
EKG ATRIAL RATE: 92 BPM
EKG P AXIS: 62 DEGREES
EKG P-R INTERVAL: 160 MS
EKG Q-T INTERVAL: 432 MS
EKG QRS DURATION: 82 MS
EKG QTC CALCULATION (BAZETT): 534 MS
EKG R AXIS: 42 DEGREES
EKG T AXIS: 14 DEGREES
EKG VENTRICULAR RATE: 92 BPM
EOSINOPHILS RELATIVE PERCENT: 1 % (ref 0–4)
GFR SERPL CREATININE-BSD FRML MDRD: >60 ML/MIN/1.73M2
GFR SERPL CREATININE-BSD FRML MDRD: >60 ML/MIN/1.73M2
GLUCOSE SERPL-MCNC: 86 MG/DL (ref 70–99)
GLUCOSE SERPL-MCNC: 89 MG/DL (ref 70–99)
HCT VFR BLD AUTO: 27.4 % (ref 41–53)
HGB BLD-MCNC: 8.6 G/DL (ref 13.5–17.5)
LACTATE PLASV-SCNC: 2.9 MMOL/L (ref 0.5–2.2)
LIPASE SERPL-CCNC: 10 U/L (ref 13–60)
LYMPHOCYTES # BLD: 8 % (ref 24–44)
MAGNESIUM SERPL-MCNC: 1.1 MG/DL (ref 1.6–2.6)
MCH RBC QN AUTO: 24.1 PG (ref 26–34)
MCHC RBC AUTO-ENTMCNC: 31.5 G/DL (ref 31–37)
MCV RBC AUTO: 76.6 FL (ref 80–100)
MONOCYTES # BLD: 15 % (ref 1–7)
MORPHOLOGY: ABNORMAL
PDW BLD-RTO: 21.7 % (ref 11.5–14.9)
PLATELET # BLD AUTO: 233 K/UL (ref 150–450)
PMV BLD AUTO: 6.6 FL (ref 6–12)
POTASSIUM SERPL-SCNC: 3.7 MMOL/L (ref 3.7–5.3)
POTASSIUM SERPL-SCNC: 4.3 MMOL/L (ref 3.7–5.3)
PROT SERPL-MCNC: 4.7 G/DL (ref 6.4–8.3)
PROT SERPL-MCNC: 5.5 G/DL (ref 6.4–8.3)
RBC # BLD: 3.57 M/UL (ref 4.5–5.9)
SEG NEUTROPHILS: 75 % (ref 36–66)
SEGMENTED NEUTROPHILS ABSOLUTE COUNT: 4.94 K/UL (ref 1.3–9.1)
SODIUM SERPL-SCNC: 129 MMOL/L (ref 135–144)
SODIUM SERPL-SCNC: 132 MMOL/L (ref 135–144)
T4 FREE SERPL-MCNC: 1.29 NG/DL (ref 0.93–1.7)
TROPONIN I SERPL DL<=0.01 NG/ML-MCNC: 22 NG/L (ref 0–22)
TSH SERPL-ACNC: 2.15 UIU/ML (ref 0.3–5)
WBC # BLD AUTO: 6.6 K/UL (ref 3.5–11)

## 2023-04-12 PROCEDURE — 6360000002 HC RX W HCPCS: Performed by: NURSE PRACTITIONER

## 2023-04-12 PROCEDURE — 6370000000 HC RX 637 (ALT 250 FOR IP): Performed by: NURSE PRACTITIONER

## 2023-04-12 PROCEDURE — 80053 COMPREHEN METABOLIC PANEL: CPT

## 2023-04-12 PROCEDURE — 83880 ASSAY OF NATRIURETIC PEPTIDE: CPT

## 2023-04-12 PROCEDURE — 71045 X-RAY EXAM CHEST 1 VIEW: CPT

## 2023-04-12 PROCEDURE — 2580000003 HC RX 258: Performed by: NURSE PRACTITIONER

## 2023-04-12 PROCEDURE — 85025 COMPLETE CBC W/AUTO DIFF WBC: CPT

## 2023-04-12 PROCEDURE — 51798 US URINE CAPACITY MEASURE: CPT

## 2023-04-12 PROCEDURE — 6360000004 HC RX CONTRAST MEDICATION: Performed by: STUDENT IN AN ORGANIZED HEALTH CARE EDUCATION/TRAINING PROGRAM

## 2023-04-12 PROCEDURE — 74177 CT ABD & PELVIS W/CONTRAST: CPT

## 2023-04-12 PROCEDURE — 99223 1ST HOSP IP/OBS HIGH 75: CPT | Performed by: INTERNAL MEDICINE

## 2023-04-12 PROCEDURE — 36415 COLL VENOUS BLD VENIPUNCTURE: CPT

## 2023-04-12 PROCEDURE — 93005 ELECTROCARDIOGRAM TRACING: CPT | Performed by: STUDENT IN AN ORGANIZED HEALTH CARE EDUCATION/TRAINING PROGRAM

## 2023-04-12 PROCEDURE — 2580000003 HC RX 258: Performed by: STUDENT IN AN ORGANIZED HEALTH CARE EDUCATION/TRAINING PROGRAM

## 2023-04-12 PROCEDURE — 84484 ASSAY OF TROPONIN QUANT: CPT

## 2023-04-12 PROCEDURE — 6360000002 HC RX W HCPCS: Performed by: STUDENT IN AN ORGANIZED HEALTH CARE EDUCATION/TRAINING PROGRAM

## 2023-04-12 PROCEDURE — 84439 ASSAY OF FREE THYROXINE: CPT

## 2023-04-12 PROCEDURE — 93010 ELECTROCARDIOGRAM REPORT: CPT | Performed by: INTERNAL MEDICINE

## 2023-04-12 PROCEDURE — 83605 ASSAY OF LACTIC ACID: CPT

## 2023-04-12 PROCEDURE — 1200000000 HC SEMI PRIVATE

## 2023-04-12 PROCEDURE — 84443 ASSAY THYROID STIM HORMONE: CPT

## 2023-04-12 PROCEDURE — 99214 OFFICE O/P EST MOD 30 MIN: CPT

## 2023-04-12 PROCEDURE — 83690 ASSAY OF LIPASE: CPT

## 2023-04-12 PROCEDURE — 83735 ASSAY OF MAGNESIUM: CPT

## 2023-04-12 RX ORDER — ONDANSETRON 2 MG/ML
4 INJECTION INTRAMUSCULAR; INTRAVENOUS ONCE
Status: COMPLETED | OUTPATIENT
Start: 2023-04-12 | End: 2023-04-12

## 2023-04-12 RX ORDER — ACETAMINOPHEN 325 MG/1
650 TABLET ORAL EVERY 6 HOURS PRN
Status: DISCONTINUED | OUTPATIENT
Start: 2023-04-12 | End: 2023-04-17 | Stop reason: HOSPADM

## 2023-04-12 RX ORDER — FERROUS SULFATE 325(65) MG
325 TABLET ORAL 2 TIMES DAILY
Status: DISCONTINUED | OUTPATIENT
Start: 2023-04-12 | End: 2023-04-17 | Stop reason: HOSPADM

## 2023-04-12 RX ORDER — FUROSEMIDE 40 MG/1
40 TABLET ORAL 2 TIMES DAILY
Status: DISCONTINUED | OUTPATIENT
Start: 2023-04-12 | End: 2023-04-17 | Stop reason: HOSPADM

## 2023-04-12 RX ORDER — SODIUM CHLORIDE 0.9 % (FLUSH) 0.9 %
5-40 SYRINGE (ML) INJECTION PRN
Status: DISCONTINUED | OUTPATIENT
Start: 2023-04-12 | End: 2023-04-17 | Stop reason: HOSPADM

## 2023-04-12 RX ORDER — 0.9 % SODIUM CHLORIDE 0.9 %
500 INTRAVENOUS SOLUTION INTRAVENOUS ONCE
Status: DISCONTINUED | OUTPATIENT
Start: 2023-04-12 | End: 2023-04-17 | Stop reason: HOSPADM

## 2023-04-12 RX ORDER — SPIRONOLACTONE 25 MG/1
50 TABLET ORAL DAILY
Status: DISCONTINUED | OUTPATIENT
Start: 2023-04-12 | End: 2023-04-17 | Stop reason: HOSPADM

## 2023-04-12 RX ORDER — SODIUM CHLORIDE 0.9 % (FLUSH) 0.9 %
10 SYRINGE (ML) INJECTION PRN
Status: COMPLETED | OUTPATIENT
Start: 2023-04-12 | End: 2023-04-12

## 2023-04-12 RX ORDER — ENOXAPARIN SODIUM 100 MG/ML
40 INJECTION SUBCUTANEOUS DAILY
Status: DISCONTINUED | OUTPATIENT
Start: 2023-04-12 | End: 2023-04-17 | Stop reason: HOSPADM

## 2023-04-12 RX ORDER — KETOROLAC TROMETHAMINE 30 MG/ML
15 INJECTION, SOLUTION INTRAMUSCULAR; INTRAVENOUS EVERY 6 HOURS PRN
Status: DISPENSED | OUTPATIENT
Start: 2023-04-12 | End: 2023-04-15

## 2023-04-12 RX ORDER — SODIUM CHLORIDE 0.9 % (FLUSH) 0.9 %
5-40 SYRINGE (ML) INJECTION EVERY 12 HOURS SCHEDULED
Status: DISCONTINUED | OUTPATIENT
Start: 2023-04-12 | End: 2023-04-17 | Stop reason: HOSPADM

## 2023-04-12 RX ORDER — 0.9 % SODIUM CHLORIDE 0.9 %
100 INTRAVENOUS SOLUTION INTRAVENOUS ONCE
Status: COMPLETED | OUTPATIENT
Start: 2023-04-12 | End: 2023-04-12

## 2023-04-12 RX ORDER — MIDODRINE HYDROCHLORIDE 10 MG/1
10 TABLET ORAL 3 TIMES DAILY PRN
Status: DISCONTINUED | OUTPATIENT
Start: 2023-04-12 | End: 2023-04-17 | Stop reason: HOSPADM

## 2023-04-12 RX ORDER — ACETAMINOPHEN 650 MG/1
650 SUPPOSITORY RECTAL EVERY 6 HOURS PRN
Status: DISCONTINUED | OUTPATIENT
Start: 2023-04-12 | End: 2023-04-17 | Stop reason: HOSPADM

## 2023-04-12 RX ORDER — MAGNESIUM SULFATE 1 G/100ML
1000 INJECTION INTRAVENOUS ONCE
Status: COMPLETED | OUTPATIENT
Start: 2023-04-12 | End: 2023-04-12

## 2023-04-12 RX ORDER — ATORVASTATIN CALCIUM 20 MG/1
20 TABLET, FILM COATED ORAL NIGHTLY
Status: DISCONTINUED | OUTPATIENT
Start: 2023-04-12 | End: 2023-04-17 | Stop reason: HOSPADM

## 2023-04-12 RX ORDER — 0.9 % SODIUM CHLORIDE 0.9 %
1000 INTRAVENOUS SOLUTION INTRAVENOUS ONCE
Status: COMPLETED | OUTPATIENT
Start: 2023-04-12 | End: 2023-04-12

## 2023-04-12 RX ORDER — LACTULOSE 10 G/15ML
20 SOLUTION ORAL 3 TIMES DAILY
Status: DISCONTINUED | OUTPATIENT
Start: 2023-04-12 | End: 2023-04-17 | Stop reason: HOSPADM

## 2023-04-12 RX ORDER — KETOROLAC TROMETHAMINE 30 MG/ML
15 INJECTION, SOLUTION INTRAMUSCULAR; INTRAVENOUS ONCE
Status: COMPLETED | OUTPATIENT
Start: 2023-04-12 | End: 2023-04-12

## 2023-04-12 RX ORDER — ONDANSETRON 4 MG/1
4 TABLET, ORALLY DISINTEGRATING ORAL EVERY 8 HOURS PRN
Status: DISCONTINUED | OUTPATIENT
Start: 2023-04-12 | End: 2023-04-17 | Stop reason: HOSPADM

## 2023-04-12 RX ORDER — POLYETHYLENE GLYCOL 3350 17 G/17G
17 POWDER, FOR SOLUTION ORAL DAILY PRN
Status: DISCONTINUED | OUTPATIENT
Start: 2023-04-12 | End: 2023-04-17 | Stop reason: HOSPADM

## 2023-04-12 RX ORDER — ONDANSETRON 2 MG/ML
4 INJECTION INTRAMUSCULAR; INTRAVENOUS EVERY 6 HOURS PRN
Status: DISCONTINUED | OUTPATIENT
Start: 2023-04-12 | End: 2023-04-17 | Stop reason: HOSPADM

## 2023-04-12 RX ORDER — PANTOPRAZOLE SODIUM 40 MG/1
40 TABLET, DELAYED RELEASE ORAL DAILY
Status: DISCONTINUED | OUTPATIENT
Start: 2023-04-12 | End: 2023-04-17 | Stop reason: HOSPADM

## 2023-04-12 RX ORDER — SUCRALFATE 1 G/1
1 TABLET ORAL 4 TIMES DAILY
Status: DISCONTINUED | OUTPATIENT
Start: 2023-04-12 | End: 2023-04-17 | Stop reason: HOSPADM

## 2023-04-12 RX ORDER — SODIUM CHLORIDE 9 MG/ML
INJECTION, SOLUTION INTRAVENOUS CONTINUOUS
Status: DISCONTINUED | OUTPATIENT
Start: 2023-04-12 | End: 2023-04-17 | Stop reason: HOSPADM

## 2023-04-12 RX ORDER — MAGNESIUM SULFATE HEPTAHYDRATE 40 MG/ML
2000 INJECTION, SOLUTION INTRAVENOUS PRN
Status: DISCONTINUED | OUTPATIENT
Start: 2023-04-12 | End: 2023-04-17 | Stop reason: HOSPADM

## 2023-04-12 RX ORDER — SODIUM CHLORIDE 9 MG/ML
INJECTION, SOLUTION INTRAVENOUS PRN
Status: DISCONTINUED | OUTPATIENT
Start: 2023-04-12 | End: 2023-04-17 | Stop reason: HOSPADM

## 2023-04-12 RX ADMIN — SODIUM CHLORIDE 1000 ML: 9 INJECTION, SOLUTION INTRAVENOUS at 00:22

## 2023-04-12 RX ADMIN — KETOROLAC TROMETHAMINE 15 MG: 30 INJECTION, SOLUTION INTRAMUSCULAR; INTRAVENOUS at 04:50

## 2023-04-12 RX ADMIN — MAGNESIUM SULFATE HEPTAHYDRATE 1000 MG: 1 INJECTION, SOLUTION INTRAVENOUS at 01:45

## 2023-04-12 RX ADMIN — SUCRALFATE 1 G: 1 TABLET ORAL at 08:31

## 2023-04-12 RX ADMIN — KETOROLAC TROMETHAMINE 15 MG: 30 INJECTION, SOLUTION INTRAMUSCULAR; INTRAVENOUS at 20:11

## 2023-04-12 RX ADMIN — SODIUM CHLORIDE 100 ML: 9 INJECTION, SOLUTION INTRAVENOUS at 01:24

## 2023-04-12 RX ADMIN — LACTULOSE 20 G: 20 SOLUTION ORAL at 20:11

## 2023-04-12 RX ADMIN — ONDANSETRON 4 MG: 2 INJECTION INTRAMUSCULAR; INTRAVENOUS at 04:49

## 2023-04-12 RX ADMIN — SODIUM CHLORIDE, PRESERVATIVE FREE 10 ML: 5 INJECTION INTRAVENOUS at 01:25

## 2023-04-12 RX ADMIN — ONDANSETRON 4 MG: 2 INJECTION INTRAMUSCULAR; INTRAVENOUS at 00:34

## 2023-04-12 RX ADMIN — SODIUM CHLORIDE: 9 INJECTION, SOLUTION INTRAVENOUS at 04:49

## 2023-04-12 RX ADMIN — PANTOPRAZOLE SODIUM 40 MG: 40 TABLET, DELAYED RELEASE ORAL at 08:31

## 2023-04-12 RX ADMIN — IOPAMIDOL 75 ML: 755 INJECTION, SOLUTION INTRAVENOUS at 01:25

## 2023-04-12 RX ADMIN — CEFTRIAXONE SODIUM 1000 MG: 1 INJECTION, POWDER, FOR SOLUTION INTRAMUSCULAR; INTRAVENOUS at 03:25

## 2023-04-12 RX ADMIN — SPIRONOLACTONE 50 MG: 25 TABLET ORAL at 10:51

## 2023-04-12 RX ADMIN — SUCRALFATE 1 G: 1 TABLET ORAL at 13:43

## 2023-04-12 RX ADMIN — SUCRALFATE 1 G: 1 TABLET ORAL at 20:11

## 2023-04-12 RX ADMIN — LACTULOSE 20 G: 20 SOLUTION ORAL at 13:43

## 2023-04-12 RX ADMIN — ATORVASTATIN CALCIUM 20 MG: 20 TABLET, FILM COATED ORAL at 20:11

## 2023-04-12 RX ADMIN — SUCRALFATE 1 G: 1 TABLET ORAL at 16:47

## 2023-04-12 RX ADMIN — ACETAMINOPHEN 650 MG: 325 TABLET ORAL at 08:29

## 2023-04-12 RX ADMIN — SODIUM CHLORIDE, PRESERVATIVE FREE 10 ML: 5 INJECTION INTRAVENOUS at 20:11

## 2023-04-12 RX ADMIN — SODIUM CHLORIDE 1000 ML: 9 INJECTION, SOLUTION INTRAVENOUS at 01:44

## 2023-04-12 RX ADMIN — LACTULOSE 20 G: 20 SOLUTION ORAL at 08:31

## 2023-04-12 RX ADMIN — ACETAMINOPHEN 650 MG: 325 TABLET ORAL at 18:47

## 2023-04-12 RX ADMIN — ENOXAPARIN SODIUM 40 MG: 100 INJECTION SUBCUTANEOUS at 10:51

## 2023-04-12 RX ADMIN — FERROUS SULFATE TAB 325 MG (65 MG ELEMENTAL FE) 325 MG: 325 (65 FE) TAB at 08:31

## 2023-04-12 RX ADMIN — KETOROLAC TROMETHAMINE 15 MG: 30 INJECTION, SOLUTION INTRAMUSCULAR; INTRAVENOUS at 14:02

## 2023-04-12 RX ADMIN — FERROUS SULFATE TAB 325 MG (65 MG ELEMENTAL FE) 325 MG: 325 (65 FE) TAB at 20:11

## 2023-04-12 RX ADMIN — KETOROLAC TROMETHAMINE 15 MG: 30 INJECTION, SOLUTION INTRAMUSCULAR at 00:34

## 2023-04-12 ASSESSMENT — ENCOUNTER SYMPTOMS
DIARRHEA: 0
SHORTNESS OF BREATH: 0
VOMITING: 1
EYE DISCHARGE: 0
NAUSEA: 1
SORE THROAT: 0
CHEST TIGHTNESS: 0
ABDOMINAL PAIN: 1
RHINORRHEA: 0
EYE REDNESS: 0

## 2023-04-12 ASSESSMENT — PAIN DESCRIPTION - LOCATION
LOCATION: ABDOMEN
LOCATION: ABDOMEN
LOCATION: ABDOMEN;HEAD
LOCATION: ABDOMEN
LOCATION: ABDOMEN

## 2023-04-12 ASSESSMENT — PAIN DESCRIPTION - DESCRIPTORS
DESCRIPTORS: ACHING;DULL
DESCRIPTORS: ACHING;DULL;SHARP
DESCRIPTORS: ACHING
DESCRIPTORS: ACHING
DESCRIPTORS: ACHING;DISCOMFORT

## 2023-04-12 ASSESSMENT — PAIN SCALES - GENERAL
PAINLEVEL_OUTOF10: 8
PAINLEVEL_OUTOF10: 9
PAINLEVEL_OUTOF10: 5
PAINLEVEL_OUTOF10: 9

## 2023-04-12 ASSESSMENT — PAIN DESCRIPTION - ORIENTATION
ORIENTATION: MID
ORIENTATION: MID

## 2023-04-12 ASSESSMENT — PAIN - FUNCTIONAL ASSESSMENT
PAIN_FUNCTIONAL_ASSESSMENT: ACTIVITIES ARE NOT PREVENTED
PAIN_FUNCTIONAL_ASSESSMENT: PREVENTS OR INTERFERES SOME ACTIVE ACTIVITIES AND ADLS

## 2023-04-13 PROBLEM — E43 SEVERE MALNUTRITION (HCC): Status: ACTIVE | Noted: 2023-04-13

## 2023-04-13 LAB
ABSOLUTE EOS #: 0.12 K/UL (ref 0–0.4)
ABSOLUTE LYMPH #: 0.29 K/UL (ref 1–4.8)
ABSOLUTE MONO #: 0.41 K/UL (ref 0.1–1.3)
ALBUMIN SERPL-MCNC: 1.6 G/DL (ref 3.5–5.2)
ALP SERPL-CCNC: 120 U/L (ref 40–129)
ALT SERPL-CCNC: 8 U/L (ref 5–41)
ANION GAP SERPL CALCULATED.3IONS-SCNC: 8 MMOL/L (ref 9–17)
AST SERPL-CCNC: 24 U/L
BASOPHILS # BLD: 0 % (ref 0–2)
BASOPHILS ABSOLUTE: 0 K/UL (ref 0–0.2)
BILIRUB SERPL-MCNC: 1.5 MG/DL (ref 0.3–1.2)
BUN SERPL-MCNC: 7 MG/DL (ref 8–23)
CALCIUM SERPL-MCNC: 7.4 MG/DL (ref 8.6–10.4)
CHLORIDE SERPL-SCNC: 104 MMOL/L (ref 98–107)
CO2 SERPL-SCNC: 19 MMOL/L (ref 20–31)
CREAT SERPL-MCNC: 0.52 MG/DL (ref 0.7–1.2)
EOSINOPHILS RELATIVE PERCENT: 3 % (ref 0–4)
GFR SERPL CREATININE-BSD FRML MDRD: >60 ML/MIN/1.73M2
GLUCOSE SERPL-MCNC: 77 MG/DL (ref 70–99)
HCT VFR BLD AUTO: 23.9 % (ref 41–53)
HGB BLD-MCNC: 7.6 G/DL (ref 13.5–17.5)
LYMPHOCYTES # BLD: 7 % (ref 24–44)
MCH RBC QN AUTO: 24.6 PG (ref 26–34)
MCHC RBC AUTO-ENTMCNC: 31.7 G/DL (ref 31–37)
MCV RBC AUTO: 77.5 FL (ref 80–100)
MONOCYTES # BLD: 10 % (ref 1–7)
MORPHOLOGY: ABNORMAL
PDW BLD-RTO: 21.8 % (ref 11.5–14.9)
PLATELET # BLD AUTO: 194 K/UL (ref 150–450)
PMV BLD AUTO: 6.5 FL (ref 6–12)
POTASSIUM SERPL-SCNC: 3.8 MMOL/L (ref 3.7–5.3)
PROT SERPL-MCNC: 4.4 G/DL (ref 6.4–8.3)
RBC # BLD: 3.08 M/UL (ref 4.5–5.9)
SEG NEUTROPHILS: 80 % (ref 36–66)
SEGMENTED NEUTROPHILS ABSOLUTE COUNT: 3.28 K/UL (ref 1.3–9.1)
SODIUM SERPL-SCNC: 131 MMOL/L (ref 135–144)
WBC # BLD AUTO: 4.1 K/UL (ref 3.5–11)

## 2023-04-13 PROCEDURE — 6370000000 HC RX 637 (ALT 250 FOR IP): Performed by: NURSE PRACTITIONER

## 2023-04-13 PROCEDURE — 51798 US URINE CAPACITY MEASURE: CPT

## 2023-04-13 PROCEDURE — 6360000002 HC RX W HCPCS: Performed by: NURSE PRACTITIONER

## 2023-04-13 PROCEDURE — 2580000003 HC RX 258: Performed by: INTERNAL MEDICINE

## 2023-04-13 PROCEDURE — 36415 COLL VENOUS BLD VENIPUNCTURE: CPT

## 2023-04-13 PROCEDURE — 6370000000 HC RX 637 (ALT 250 FOR IP): Performed by: UROLOGY

## 2023-04-13 PROCEDURE — 99232 SBSQ HOSP IP/OBS MODERATE 35: CPT | Performed by: INTERNAL MEDICINE

## 2023-04-13 PROCEDURE — 99211 OFF/OP EST MAY X REQ PHY/QHP: CPT

## 2023-04-13 PROCEDURE — 99221 1ST HOSP IP/OBS SF/LOW 40: CPT | Performed by: PSYCHIATRY & NEUROLOGY

## 2023-04-13 PROCEDURE — 2580000003 HC RX 258: Performed by: NURSE PRACTITIONER

## 2023-04-13 PROCEDURE — 6370000000 HC RX 637 (ALT 250 FOR IP): Performed by: SURGERY

## 2023-04-13 PROCEDURE — 1200000000 HC SEMI PRIVATE

## 2023-04-13 PROCEDURE — 85025 COMPLETE CBC W/AUTO DIFF WBC: CPT

## 2023-04-13 PROCEDURE — 6360000002 HC RX W HCPCS: Performed by: INTERNAL MEDICINE

## 2023-04-13 PROCEDURE — 80053 COMPREHEN METABOLIC PANEL: CPT

## 2023-04-13 RX ORDER — M-VIT,TX,IRON,MINS/CALC/FOLIC 27MG-0.4MG
1 TABLET ORAL DAILY
Status: DISCONTINUED | OUTPATIENT
Start: 2023-04-13 | End: 2023-04-17 | Stop reason: HOSPADM

## 2023-04-13 RX ORDER — OXYCODONE HYDROCHLORIDE AND ACETAMINOPHEN 5; 325 MG/1; MG/1
1 TABLET ORAL EVERY 4 HOURS PRN
Status: DISCONTINUED | OUTPATIENT
Start: 2023-04-13 | End: 2023-04-17 | Stop reason: HOSPADM

## 2023-04-13 RX ORDER — TAMSULOSIN HYDROCHLORIDE 0.4 MG/1
0.4 CAPSULE ORAL DAILY
Status: DISCONTINUED | OUTPATIENT
Start: 2023-04-13 | End: 2023-04-17 | Stop reason: HOSPADM

## 2023-04-13 RX ORDER — LIDOCAINE HYDROCHLORIDE 20 MG/ML
JELLY TOPICAL PRN
Status: DISCONTINUED | OUTPATIENT
Start: 2023-04-13 | End: 2023-04-17 | Stop reason: HOSPADM

## 2023-04-13 RX ADMIN — LACTULOSE 20 G: 20 SOLUTION ORAL at 14:35

## 2023-04-13 RX ADMIN — SODIUM CHLORIDE: 9 INJECTION, SOLUTION INTRAVENOUS at 22:36

## 2023-04-13 RX ADMIN — ATORVASTATIN CALCIUM 20 MG: 20 TABLET, FILM COATED ORAL at 19:52

## 2023-04-13 RX ADMIN — KETOROLAC TROMETHAMINE 15 MG: 30 INJECTION, SOLUTION INTRAMUSCULAR; INTRAVENOUS at 12:30

## 2023-04-13 RX ADMIN — PANTOPRAZOLE SODIUM 40 MG: 40 TABLET, DELAYED RELEASE ORAL at 08:51

## 2023-04-13 RX ADMIN — ENOXAPARIN SODIUM 40 MG: 100 INJECTION SUBCUTANEOUS at 08:51

## 2023-04-13 RX ADMIN — ONDANSETRON 4 MG: 2 INJECTION INTRAMUSCULAR; INTRAVENOUS at 15:29

## 2023-04-13 RX ADMIN — SUCRALFATE 1 G: 1 TABLET ORAL at 17:12

## 2023-04-13 RX ADMIN — MULTIPLE VITAMINS W/ MINERALS TAB 1 TABLET: TAB at 08:51

## 2023-04-13 RX ADMIN — OXYCODONE AND ACETAMINOPHEN 1 TABLET: 5; 325 TABLET ORAL at 22:35

## 2023-04-13 RX ADMIN — SODIUM CHLORIDE: 9 INJECTION, SOLUTION INTRAVENOUS at 08:52

## 2023-04-13 RX ADMIN — ONDANSETRON 4 MG: 2 INJECTION INTRAMUSCULAR; INTRAVENOUS at 21:19

## 2023-04-13 RX ADMIN — LACTULOSE 20 G: 20 SOLUTION ORAL at 08:51

## 2023-04-13 RX ADMIN — SUCRALFATE 1 G: 1 TABLET ORAL at 12:30

## 2023-04-13 RX ADMIN — ONDANSETRON 4 MG: 2 INJECTION INTRAMUSCULAR; INTRAVENOUS at 03:08

## 2023-04-13 RX ADMIN — FERROUS SULFATE TAB 325 MG (65 MG ELEMENTAL FE) 325 MG: 325 (65 FE) TAB at 19:52

## 2023-04-13 RX ADMIN — SUCRALFATE 1 G: 1 TABLET ORAL at 19:52

## 2023-04-13 RX ADMIN — SPIRONOLACTONE 50 MG: 25 TABLET ORAL at 08:51

## 2023-04-13 RX ADMIN — SUCRALFATE 1 G: 1 TABLET ORAL at 08:51

## 2023-04-13 RX ADMIN — ACETAMINOPHEN 650 MG: 325 TABLET ORAL at 21:18

## 2023-04-13 RX ADMIN — LACTULOSE 20 G: 20 SOLUTION ORAL at 19:51

## 2023-04-13 RX ADMIN — CEFTRIAXONE SODIUM 1000 MG: 1 INJECTION, POWDER, FOR SOLUTION INTRAMUSCULAR; INTRAVENOUS at 03:05

## 2023-04-13 RX ADMIN — ACETAMINOPHEN 650 MG: 325 TABLET ORAL at 15:33

## 2023-04-13 RX ADMIN — TAMSULOSIN HYDROCHLORIDE 0.4 MG: 0.4 CAPSULE ORAL at 12:30

## 2023-04-13 RX ADMIN — FERROUS SULFATE TAB 325 MG (65 MG ELEMENTAL FE) 325 MG: 325 (65 FE) TAB at 08:51

## 2023-04-13 RX ADMIN — KETOROLAC TROMETHAMINE 15 MG: 30 INJECTION, SOLUTION INTRAMUSCULAR; INTRAVENOUS at 03:15

## 2023-04-13 RX ADMIN — KETOROLAC TROMETHAMINE 15 MG: 30 INJECTION, SOLUTION INTRAMUSCULAR; INTRAVENOUS at 19:51

## 2023-04-13 ASSESSMENT — PAIN DESCRIPTION - LOCATION
LOCATION: ABDOMEN

## 2023-04-13 ASSESSMENT — PAIN SCALES - GENERAL
PAINLEVEL_OUTOF10: 8
PAINLEVEL_OUTOF10: 10
PAINLEVEL_OUTOF10: 8
PAINLEVEL_OUTOF10: 6
PAINLEVEL_OUTOF10: 8

## 2023-04-13 ASSESSMENT — PAIN DESCRIPTION - DESCRIPTORS
DESCRIPTORS: ACHING
DESCRIPTORS: DISCOMFORT;ACHING

## 2023-04-13 ASSESSMENT — PAIN DESCRIPTION - ORIENTATION
ORIENTATION: MID

## 2023-04-13 ASSESSMENT — PAIN - FUNCTIONAL ASSESSMENT: PAIN_FUNCTIONAL_ASSESSMENT: PREVENTS OR INTERFERES SOME ACTIVE ACTIVITIES AND ADLS

## 2023-04-14 PROBLEM — F05 DELIRIUM DUE TO ANOTHER MEDICAL CONDITION: Status: ACTIVE | Noted: 2023-04-14

## 2023-04-14 PROCEDURE — 1200000000 HC SEMI PRIVATE

## 2023-04-14 PROCEDURE — 2580000003 HC RX 258: Performed by: NURSE PRACTITIONER

## 2023-04-14 PROCEDURE — 2580000003 HC RX 258: Performed by: INTERNAL MEDICINE

## 2023-04-14 PROCEDURE — 6370000000 HC RX 637 (ALT 250 FOR IP): Performed by: SURGERY

## 2023-04-14 PROCEDURE — 6370000000 HC RX 637 (ALT 250 FOR IP): Performed by: NURSE PRACTITIONER

## 2023-04-14 PROCEDURE — 99232 SBSQ HOSP IP/OBS MODERATE 35: CPT | Performed by: PSYCHIATRY & NEUROLOGY

## 2023-04-14 PROCEDURE — 6360000002 HC RX W HCPCS: Performed by: INTERNAL MEDICINE

## 2023-04-14 PROCEDURE — 6360000002 HC RX W HCPCS: Performed by: NURSE PRACTITIONER

## 2023-04-14 PROCEDURE — 99232 SBSQ HOSP IP/OBS MODERATE 35: CPT | Performed by: INTERNAL MEDICINE

## 2023-04-14 PROCEDURE — 6370000000 HC RX 637 (ALT 250 FOR IP): Performed by: UROLOGY

## 2023-04-14 RX ADMIN — FERROUS SULFATE TAB 325 MG (65 MG ELEMENTAL FE) 325 MG: 325 (65 FE) TAB at 16:47

## 2023-04-14 RX ADMIN — OXYCODONE AND ACETAMINOPHEN 1 TABLET: 5; 325 TABLET ORAL at 20:17

## 2023-04-14 RX ADMIN — PANTOPRAZOLE SODIUM 40 MG: 40 TABLET, DELAYED RELEASE ORAL at 10:16

## 2023-04-14 RX ADMIN — ONDANSETRON 4 MG: 2 INJECTION INTRAMUSCULAR; INTRAVENOUS at 10:17

## 2023-04-14 RX ADMIN — CEFTRIAXONE SODIUM 1000 MG: 1 INJECTION, POWDER, FOR SOLUTION INTRAMUSCULAR; INTRAVENOUS at 04:12

## 2023-04-14 RX ADMIN — LACTULOSE 20 G: 20 SOLUTION ORAL at 13:42

## 2023-04-14 RX ADMIN — MULTIPLE VITAMINS W/ MINERALS TAB 1 TABLET: TAB at 10:17

## 2023-04-14 RX ADMIN — SUCRALFATE 1 G: 1 TABLET ORAL at 13:42

## 2023-04-14 RX ADMIN — ONDANSETRON 4 MG: 2 INJECTION INTRAMUSCULAR; INTRAVENOUS at 16:12

## 2023-04-14 RX ADMIN — OXYCODONE AND ACETAMINOPHEN 1 TABLET: 5; 325 TABLET ORAL at 10:16

## 2023-04-14 RX ADMIN — FERROUS SULFATE TAB 325 MG (65 MG ELEMENTAL FE) 325 MG: 325 (65 FE) TAB at 10:17

## 2023-04-14 RX ADMIN — ONDANSETRON 4 MG: 2 INJECTION INTRAMUSCULAR; INTRAVENOUS at 22:16

## 2023-04-14 RX ADMIN — OXYCODONE AND ACETAMINOPHEN 1 TABLET: 5; 325 TABLET ORAL at 14:21

## 2023-04-14 RX ADMIN — SUCRALFATE 1 G: 1 TABLET ORAL at 16:47

## 2023-04-14 RX ADMIN — ATORVASTATIN CALCIUM 20 MG: 20 TABLET, FILM COATED ORAL at 20:17

## 2023-04-14 RX ADMIN — ENOXAPARIN SODIUM 40 MG: 100 INJECTION SUBCUTANEOUS at 10:18

## 2023-04-14 RX ADMIN — SPIRONOLACTONE 50 MG: 25 TABLET ORAL at 10:16

## 2023-04-14 RX ADMIN — LACTULOSE 20 G: 20 SOLUTION ORAL at 10:20

## 2023-04-14 RX ADMIN — SUCRALFATE 1 G: 1 TABLET ORAL at 20:17

## 2023-04-14 RX ADMIN — SUCRALFATE 1 G: 1 TABLET ORAL at 10:16

## 2023-04-14 RX ADMIN — SODIUM CHLORIDE: 9 INJECTION, SOLUTION INTRAVENOUS at 11:55

## 2023-04-14 RX ADMIN — TAMSULOSIN HYDROCHLORIDE 0.4 MG: 0.4 CAPSULE ORAL at 10:16

## 2023-04-14 RX ADMIN — LACTULOSE 20 G: 20 SOLUTION ORAL at 20:17

## 2023-04-14 ASSESSMENT — PAIN DESCRIPTION - DESCRIPTORS
DESCRIPTORS: SHARP
DESCRIPTORS: SHARP
DESCRIPTORS: SHARP;CRAMPING

## 2023-04-14 ASSESSMENT — PAIN DESCRIPTION - LOCATION
LOCATION: ABDOMEN

## 2023-04-14 ASSESSMENT — PAIN SCALES - GENERAL
PAINLEVEL_OUTOF10: 0
PAINLEVEL_OUTOF10: 0
PAINLEVEL_OUTOF10: 7
PAINLEVEL_OUTOF10: 8
PAINLEVEL_OUTOF10: 7
PAINLEVEL_OUTOF10: 8
PAINLEVEL_OUTOF10: 0
PAINLEVEL_OUTOF10: 8
PAINLEVEL_OUTOF10: 0
PAINLEVEL_OUTOF10: 6
PAINLEVEL_OUTOF10: 6
PAINLEVEL_OUTOF10: 7
PAINLEVEL_OUTOF10: 0

## 2023-04-14 ASSESSMENT — PAIN SCALES - WONG BAKER
WONGBAKER_NUMERICALRESPONSE: 0

## 2023-04-14 ASSESSMENT — PAIN - FUNCTIONAL ASSESSMENT
PAIN_FUNCTIONAL_ASSESSMENT: PREVENTS OR INTERFERES SOME ACTIVE ACTIVITIES AND ADLS
PAIN_FUNCTIONAL_ASSESSMENT: PREVENTS OR INTERFERES SOME ACTIVE ACTIVITIES AND ADLS

## 2023-04-14 ASSESSMENT — PAIN DESCRIPTION - ORIENTATION
ORIENTATION: LOWER
ORIENTATION: MID
ORIENTATION: LOWER

## 2023-04-15 LAB
ABSOLUTE EOS #: 0.14 K/UL (ref 0–0.4)
ABSOLUTE LYMPH #: 0.48 K/UL (ref 1–4.8)
ABSOLUTE MONO #: 0.72 K/UL (ref 0.1–1.3)
ALBUMIN SERPL-MCNC: 1.7 G/DL (ref 3.5–5.2)
ALP SERPL-CCNC: 111 U/L (ref 40–129)
ALT SERPL-CCNC: 6 U/L (ref 5–41)
ANION GAP SERPL CALCULATED.3IONS-SCNC: 9 MMOL/L (ref 9–17)
AST SERPL-CCNC: 28 U/L
BASOPHILS # BLD: 0 % (ref 0–2)
BASOPHILS ABSOLUTE: 0 K/UL (ref 0–0.2)
BILIRUB SERPL-MCNC: 0.8 MG/DL (ref 0.3–1.2)
BUN SERPL-MCNC: 5 MG/DL (ref 8–23)
CALCIUM SERPL-MCNC: 7.6 MG/DL (ref 8.6–10.4)
CHLORIDE SERPL-SCNC: 112 MMOL/L (ref 98–107)
CO2 SERPL-SCNC: 18 MMOL/L (ref 20–31)
CREAT SERPL-MCNC: 0.43 MG/DL (ref 0.7–1.2)
EOSINOPHILS RELATIVE PERCENT: 3 % (ref 0–4)
GFR SERPL CREATININE-BSD FRML MDRD: >60 ML/MIN/1.73M2
GLUCOSE SERPL-MCNC: 97 MG/DL (ref 70–99)
HCT VFR BLD AUTO: 26 % (ref 41–53)
HGB BLD-MCNC: 7.9 G/DL (ref 13.5–17.5)
LYMPHOCYTES # BLD: 10 % (ref 24–44)
MCH RBC QN AUTO: 24.4 PG (ref 26–34)
MCHC RBC AUTO-ENTMCNC: 30.5 G/DL (ref 31–37)
MCV RBC AUTO: 80 FL (ref 80–100)
MONOCYTES # BLD: 15 % (ref 1–7)
MORPHOLOGY: ABNORMAL
MORPHOLOGY: ABNORMAL
PDW BLD-RTO: 22.2 % (ref 11.5–14.9)
PLATELET # BLD AUTO: 189 K/UL (ref 150–450)
PMV BLD AUTO: 6.4 FL (ref 6–12)
POTASSIUM SERPL-SCNC: 4.3 MMOL/L (ref 3.7–5.3)
PROT SERPL-MCNC: 4.6 G/DL (ref 6.4–8.3)
RBC # BLD: 3.24 M/UL (ref 4.5–5.9)
SEG NEUTROPHILS: 72 % (ref 36–66)
SEGMENTED NEUTROPHILS ABSOLUTE COUNT: 3.46 K/UL (ref 1.3–9.1)
SODIUM SERPL-SCNC: 139 MMOL/L (ref 135–144)
WBC # BLD AUTO: 4.8 K/UL (ref 3.5–11)

## 2023-04-15 PROCEDURE — 36415 COLL VENOUS BLD VENIPUNCTURE: CPT

## 2023-04-15 PROCEDURE — 6370000000 HC RX 637 (ALT 250 FOR IP): Performed by: SURGERY

## 2023-04-15 PROCEDURE — 6360000002 HC RX W HCPCS: Performed by: NURSE PRACTITIONER

## 2023-04-15 PROCEDURE — 85025 COMPLETE CBC W/AUTO DIFF WBC: CPT

## 2023-04-15 PROCEDURE — 6370000000 HC RX 637 (ALT 250 FOR IP): Performed by: UROLOGY

## 2023-04-15 PROCEDURE — 2580000003 HC RX 258: Performed by: NURSE PRACTITIONER

## 2023-04-15 PROCEDURE — 99232 SBSQ HOSP IP/OBS MODERATE 35: CPT | Performed by: INTERNAL MEDICINE

## 2023-04-15 PROCEDURE — 1200000000 HC SEMI PRIVATE

## 2023-04-15 PROCEDURE — 6360000002 HC RX W HCPCS: Performed by: INTERNAL MEDICINE

## 2023-04-15 PROCEDURE — 6370000000 HC RX 637 (ALT 250 FOR IP): Performed by: NURSE PRACTITIONER

## 2023-04-15 PROCEDURE — 2580000003 HC RX 258: Performed by: INTERNAL MEDICINE

## 2023-04-15 PROCEDURE — 80053 COMPREHEN METABOLIC PANEL: CPT

## 2023-04-15 RX ADMIN — OXYCODONE AND ACETAMINOPHEN 1 TABLET: 5; 325 TABLET ORAL at 20:05

## 2023-04-15 RX ADMIN — LACTULOSE 20 G: 20 SOLUTION ORAL at 20:24

## 2023-04-15 RX ADMIN — OXYCODONE AND ACETAMINOPHEN 1 TABLET: 5; 325 TABLET ORAL at 03:45

## 2023-04-15 RX ADMIN — MULTIPLE VITAMINS W/ MINERALS TAB 1 TABLET: TAB at 08:07

## 2023-04-15 RX ADMIN — ATORVASTATIN CALCIUM 20 MG: 20 TABLET, FILM COATED ORAL at 20:24

## 2023-04-15 RX ADMIN — ONDANSETRON 4 MG: 2 INJECTION INTRAMUSCULAR; INTRAVENOUS at 12:55

## 2023-04-15 RX ADMIN — ENOXAPARIN SODIUM 40 MG: 100 INJECTION SUBCUTANEOUS at 08:07

## 2023-04-15 RX ADMIN — SODIUM CHLORIDE: 9 INJECTION, SOLUTION INTRAVENOUS at 03:41

## 2023-04-15 RX ADMIN — SUCRALFATE 1 G: 1 TABLET ORAL at 08:07

## 2023-04-15 RX ADMIN — SPIRONOLACTONE 50 MG: 25 TABLET ORAL at 08:06

## 2023-04-15 RX ADMIN — OXYCODONE AND ACETAMINOPHEN 1 TABLET: 5; 325 TABLET ORAL at 12:55

## 2023-04-15 RX ADMIN — ONDANSETRON 4 MG: 2 INJECTION INTRAMUSCULAR; INTRAVENOUS at 20:05

## 2023-04-15 RX ADMIN — SUCRALFATE 1 G: 1 TABLET ORAL at 20:24

## 2023-04-15 RX ADMIN — CEFTRIAXONE SODIUM 1000 MG: 1 INJECTION, POWDER, FOR SOLUTION INTRAMUSCULAR; INTRAVENOUS at 03:42

## 2023-04-15 RX ADMIN — FERROUS SULFATE TAB 325 MG (65 MG ELEMENTAL FE) 325 MG: 325 (65 FE) TAB at 08:07

## 2023-04-15 RX ADMIN — FERROUS SULFATE TAB 325 MG (65 MG ELEMENTAL FE) 325 MG: 325 (65 FE) TAB at 16:54

## 2023-04-15 RX ADMIN — TAMSULOSIN HYDROCHLORIDE 0.4 MG: 0.4 CAPSULE ORAL at 08:06

## 2023-04-15 RX ADMIN — PANTOPRAZOLE SODIUM 40 MG: 40 TABLET, DELAYED RELEASE ORAL at 06:03

## 2023-04-15 RX ADMIN — SUCRALFATE 1 G: 1 TABLET ORAL at 16:54

## 2023-04-15 RX ADMIN — LACTULOSE 20 G: 20 SOLUTION ORAL at 08:07

## 2023-04-15 RX ADMIN — ONDANSETRON 4 MG: 2 INJECTION INTRAMUSCULAR; INTRAVENOUS at 04:29

## 2023-04-15 RX ADMIN — LACTULOSE 20 G: 20 SOLUTION ORAL at 12:55

## 2023-04-15 RX ADMIN — SUCRALFATE 1 G: 1 TABLET ORAL at 12:55

## 2023-04-15 ASSESSMENT — PAIN SCALES - GENERAL
PAINLEVEL_OUTOF10: 3
PAINLEVEL_OUTOF10: 8
PAINLEVEL_OUTOF10: 0
PAINLEVEL_OUTOF10: 0
PAINLEVEL_OUTOF10: 8
PAINLEVEL_OUTOF10: 0
PAINLEVEL_OUTOF10: 0

## 2023-04-15 ASSESSMENT — PAIN DESCRIPTION - ORIENTATION
ORIENTATION: RIGHT;LOWER
ORIENTATION: LOWER
ORIENTATION: MID

## 2023-04-15 ASSESSMENT — PAIN DESCRIPTION - LOCATION
LOCATION: ABDOMEN

## 2023-04-15 ASSESSMENT — PAIN DESCRIPTION - DESCRIPTORS
DESCRIPTORS: SHARP
DESCRIPTORS: ACHING
DESCRIPTORS: SHARP

## 2023-04-15 ASSESSMENT — PAIN - FUNCTIONAL ASSESSMENT: PAIN_FUNCTIONAL_ASSESSMENT: PREVENTS OR INTERFERES SOME ACTIVE ACTIVITIES AND ADLS

## 2023-04-16 PROCEDURE — 6370000000 HC RX 637 (ALT 250 FOR IP): Performed by: SURGERY

## 2023-04-16 PROCEDURE — 1200000000 HC SEMI PRIVATE

## 2023-04-16 PROCEDURE — 6360000002 HC RX W HCPCS: Performed by: NURSE PRACTITIONER

## 2023-04-16 PROCEDURE — 6370000000 HC RX 637 (ALT 250 FOR IP): Performed by: NURSE PRACTITIONER

## 2023-04-16 PROCEDURE — 99231 SBSQ HOSP IP/OBS SF/LOW 25: CPT | Performed by: INTERNAL MEDICINE

## 2023-04-16 PROCEDURE — 6370000000 HC RX 637 (ALT 250 FOR IP): Performed by: UROLOGY

## 2023-04-16 PROCEDURE — 2580000003 HC RX 258: Performed by: NURSE PRACTITIONER

## 2023-04-16 RX ADMIN — ONDANSETRON 4 MG: 2 INJECTION INTRAMUSCULAR; INTRAVENOUS at 21:49

## 2023-04-16 RX ADMIN — SODIUM CHLORIDE, PRESERVATIVE FREE 10 ML: 5 INJECTION INTRAVENOUS at 08:25

## 2023-04-16 RX ADMIN — OXYCODONE AND ACETAMINOPHEN 1 TABLET: 5; 325 TABLET ORAL at 21:49

## 2023-04-16 RX ADMIN — MULTIPLE VITAMINS W/ MINERALS TAB 1 TABLET: TAB at 10:15

## 2023-04-16 RX ADMIN — OXYCODONE AND ACETAMINOPHEN 1 TABLET: 5; 325 TABLET ORAL at 00:36

## 2023-04-16 RX ADMIN — SUCRALFATE 1 G: 1 TABLET ORAL at 10:15

## 2023-04-16 RX ADMIN — LACTULOSE 20 G: 20 SOLUTION ORAL at 21:49

## 2023-04-16 RX ADMIN — PANTOPRAZOLE SODIUM 40 MG: 40 TABLET, DELAYED RELEASE ORAL at 06:02

## 2023-04-16 RX ADMIN — OXYCODONE AND ACETAMINOPHEN 1 TABLET: 5; 325 TABLET ORAL at 06:08

## 2023-04-16 RX ADMIN — FERROUS SULFATE TAB 325 MG (65 MG ELEMENTAL FE) 325 MG: 325 (65 FE) TAB at 16:57

## 2023-04-16 RX ADMIN — SUCRALFATE 1 G: 1 TABLET ORAL at 21:49

## 2023-04-16 RX ADMIN — FERROUS SULFATE TAB 325 MG (65 MG ELEMENTAL FE) 325 MG: 325 (65 FE) TAB at 10:17

## 2023-04-16 RX ADMIN — ATORVASTATIN CALCIUM 20 MG: 20 TABLET, FILM COATED ORAL at 21:49

## 2023-04-16 RX ADMIN — LACTULOSE 20 G: 20 SOLUTION ORAL at 13:14

## 2023-04-16 RX ADMIN — SUCRALFATE 1 G: 1 TABLET ORAL at 13:14

## 2023-04-16 RX ADMIN — SUCRALFATE 1 G: 1 TABLET ORAL at 16:57

## 2023-04-16 RX ADMIN — SPIRONOLACTONE 50 MG: 25 TABLET ORAL at 10:17

## 2023-04-16 RX ADMIN — TAMSULOSIN HYDROCHLORIDE 0.4 MG: 0.4 CAPSULE ORAL at 10:15

## 2023-04-16 RX ADMIN — ONDANSETRON 4 MG: 2 INJECTION INTRAMUSCULAR; INTRAVENOUS at 06:08

## 2023-04-16 RX ADMIN — ENOXAPARIN SODIUM 40 MG: 100 INJECTION SUBCUTANEOUS at 10:17

## 2023-04-16 RX ADMIN — LACTULOSE 20 G: 20 SOLUTION ORAL at 10:15

## 2023-04-16 RX ADMIN — OXYCODONE AND ACETAMINOPHEN 1 TABLET: 5; 325 TABLET ORAL at 10:15

## 2023-04-16 ASSESSMENT — PAIN SCALES - GENERAL
PAINLEVEL_OUTOF10: 4
PAINLEVEL_OUTOF10: 8

## 2023-04-16 ASSESSMENT — PAIN DESCRIPTION - ORIENTATION
ORIENTATION: RIGHT
ORIENTATION: RIGHT;LOWER
ORIENTATION: LOWER;RIGHT
ORIENTATION: RIGHT
ORIENTATION: RIGHT

## 2023-04-16 ASSESSMENT — PAIN DESCRIPTION - DESCRIPTORS
DESCRIPTORS: BURNING
DESCRIPTORS: SHARP

## 2023-04-16 ASSESSMENT — PAIN DESCRIPTION - LOCATION
LOCATION: ABDOMEN

## 2023-04-16 ASSESSMENT — PAIN - FUNCTIONAL ASSESSMENT: PAIN_FUNCTIONAL_ASSESSMENT: ACTIVITIES ARE NOT PREVENTED

## 2023-04-16 ASSESSMENT — PAIN SCALES - WONG BAKER: WONGBAKER_NUMERICALRESPONSE: 0

## 2023-04-17 VITALS
BODY MASS INDEX: 24.93 KG/M2 | HEIGHT: 70 IN | WEIGHT: 174.16 LBS | TEMPERATURE: 98.2 F | OXYGEN SATURATION: 95 % | RESPIRATION RATE: 18 BRPM | DIASTOLIC BLOOD PRESSURE: 57 MMHG | SYSTOLIC BLOOD PRESSURE: 104 MMHG | HEART RATE: 77 BPM

## 2023-04-17 PROCEDURE — 99211 OFF/OP EST MAY X REQ PHY/QHP: CPT

## 2023-04-17 PROCEDURE — 6370000000 HC RX 637 (ALT 250 FOR IP): Performed by: NURSE PRACTITIONER

## 2023-04-17 PROCEDURE — 6370000000 HC RX 637 (ALT 250 FOR IP): Performed by: SURGERY

## 2023-04-17 PROCEDURE — 6360000002 HC RX W HCPCS: Performed by: NURSE PRACTITIONER

## 2023-04-17 PROCEDURE — 6370000000 HC RX 637 (ALT 250 FOR IP): Performed by: UROLOGY

## 2023-04-17 PROCEDURE — 99239 HOSP IP/OBS DSCHRG MGMT >30: CPT | Performed by: INTERNAL MEDICINE

## 2023-04-17 PROCEDURE — 99232 SBSQ HOSP IP/OBS MODERATE 35: CPT | Performed by: PSYCHIATRY & NEUROLOGY

## 2023-04-17 RX ORDER — M-VIT,TX,IRON,MINS/CALC/FOLIC 27MG-0.4MG
1 TABLET ORAL DAILY
Qty: 30 TABLET | Refills: 2 | Status: SHIPPED | OUTPATIENT
Start: 2023-04-17

## 2023-04-17 RX ORDER — TAMSULOSIN HYDROCHLORIDE 0.4 MG/1
0.4 CAPSULE ORAL DAILY
Qty: 30 CAPSULE | Refills: 3 | Status: SHIPPED | OUTPATIENT
Start: 2023-04-17

## 2023-04-17 RX ORDER — OXYCODONE HYDROCHLORIDE AND ACETAMINOPHEN 5; 325 MG/1; MG/1
1 TABLET ORAL EVERY 6 HOURS PRN
Qty: 12 TABLET | Refills: 0 | Status: SHIPPED | OUTPATIENT
Start: 2023-04-17 | End: 2023-04-20

## 2023-04-17 RX ORDER — FUROSEMIDE 40 MG/1
20 TABLET ORAL DAILY
Qty: 60 TABLET | Refills: 3 | Status: SHIPPED | OUTPATIENT
Start: 2023-04-17

## 2023-04-17 RX ADMIN — ACETAMINOPHEN 650 MG: 325 TABLET ORAL at 01:48

## 2023-04-17 RX ADMIN — SPIRONOLACTONE 50 MG: 25 TABLET ORAL at 08:46

## 2023-04-17 RX ADMIN — FERROUS SULFATE TAB 325 MG (65 MG ELEMENTAL FE) 325 MG: 325 (65 FE) TAB at 08:46

## 2023-04-17 RX ADMIN — LACTULOSE 20 G: 20 SOLUTION ORAL at 08:46

## 2023-04-17 RX ADMIN — SUCRALFATE 1 G: 1 TABLET ORAL at 08:46

## 2023-04-17 RX ADMIN — MULTIPLE VITAMINS W/ MINERALS TAB 1 TABLET: TAB at 08:46

## 2023-04-17 RX ADMIN — OXYCODONE AND ACETAMINOPHEN 1 TABLET: 5; 325 TABLET ORAL at 08:50

## 2023-04-17 RX ADMIN — ENOXAPARIN SODIUM 40 MG: 100 INJECTION SUBCUTANEOUS at 08:46

## 2023-04-17 RX ADMIN — PANTOPRAZOLE SODIUM 40 MG: 40 TABLET, DELAYED RELEASE ORAL at 06:19

## 2023-04-17 RX ADMIN — TAMSULOSIN HYDROCHLORIDE 0.4 MG: 0.4 CAPSULE ORAL at 08:46

## 2023-04-17 RX ADMIN — SUCRALFATE 1 G: 1 TABLET ORAL at 13:18

## 2023-04-17 RX ADMIN — LACTULOSE 20 G: 20 SOLUTION ORAL at 13:18

## 2023-04-17 ASSESSMENT — PAIN SCALES - GENERAL
PAINLEVEL_OUTOF10: 8
PAINLEVEL_OUTOF10: 8
PAINLEVEL_OUTOF10: 0

## 2023-04-17 ASSESSMENT — PAIN DESCRIPTION - ORIENTATION: ORIENTATION: RIGHT

## 2023-04-17 ASSESSMENT — PAIN DESCRIPTION - DESCRIPTORS
DESCRIPTORS: ACHING
DESCRIPTORS: SHARP

## 2023-04-17 ASSESSMENT — PAIN DESCRIPTION - LOCATION
LOCATION: ABDOMEN
LOCATION: ABDOMEN

## 2023-04-17 NOTE — PROGRESS NOTES
BEHAVIORAL HEALTH FOLLOW-UP NOTE     4/17/2023     Patient was seen and examined in person, Chart reviewed   Patient's case discussed with staff/team    Chief Complaint: suicidal ideation    Interim History:     60y. o. male with a past psychiatric history of alcohol dependence with decompensated liver cirrhosis, illicit drug use (heroin), anxiety, and MDD, admitted for management of ileus. The patient seen face-to-face. He is pleasant on approach. He denies present suicidal ideation. Denies any auditory or visual hallucinations. Noted that he had uneventful weekend. He has noted with the patient required as needed medications. The patient expressed some frustration with a leak in his ileostomy tube. He was able to cope with it.        BP (!) 104/57   Pulse 77   Temp 98.2 °F (36.8 °C)   Resp 18   Ht 5' 10\" (1.778 m)   Wt 174 lb 2.6 oz (79 kg)   SpO2 95%   BMI 24.99 kg/m²   Appetite:   [] Normal/Unchanged  [] Increased  [x] Decreased      Sleep:       [] Normal/Unchanged  [] Fair       [x] Poor              Energy:    [x] Normal/Unchanged  [] Increased  [] Decreased        Aggression:  [] yes  [x] no      PAST MEDICAL/PSYCHIATRIC HISTORY:   Past Medical History:   Diagnosis Date    Abnormal EKG     Acute deep vein thrombosis (DVT) of brachial vein of right upper extremity ([address] 01/07/2023    Also- Superficial venous thrombosis of the cephalic vein in the forearm    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 Cortez Nails, orthopedic, every 3-4 months, gets steroid injection    Lezama esophagus     Bleeding gastric varices 12/29/2022    BPH (benign prostatic hypertrophy)     Cholelithiasis     Cirrhosis ([address]     COVID-19 12/2020    pt reports he had a positive test while at [facility] in 2020, was asymptomatic    COVID-19 vaccine series completed 5/20/2021, 6/22/2021    Saintclair Jacobs

## 2023-04-17 NOTE — CARE COORDINATION
Authorization is approved for [facility]. SW spoke with patient and he is agreeable to discharge to [address] [place] [address] and can transport in a wheelchair. Transportation arranged for patient to go to [address] [place] [address] at [facility] via [facility]. Facility informed. Bedside nurse informed. Number for Report: [phone]    Electronically signed by LYNDSEY Morrell on 4/17/2023 at 11:17 AM     Attempted to see patient and wake him up from nap to sign IMM. Patient was in deep sleep, and would not wake up at this moment. SW will attempt again.      Electronically signed by LYNDSEY oMrrell on 4/17/2023 at 1:51 PM

## 2023-04-17 NOTE — PROGRESS NOTES
[facility]  Progress Note      NAME:  Ramon De La Garza  MEDICAL RECORD NUMBER:  [id number]  AGE: 60 y.o. GENDER: male  : 1960  TODAY'S DATE:  2023    [facility] nurse follow up visit for ostomy care, as plan is for pt to discharge today. Pt not interested in education, but let him know that ostomy supply order numbers were added to his chart so that the facility knew what to order for him. Pt was upset that a 1 pc pouch was used when pouch was replaced over the weekend. The pouch was still intact with no signs of leakage and was not changed.  Order numbers for 2 pc high output pouch were added to [facility] per pt request.     Kenard Severs, BSN, [facility], [address], and Continence Nursing  [phone]

## 2023-04-17 NOTE — PROGRESS NOTES
Pt discharged at this time via wheelchair with Giovana Candido. Belongings taken with patient along with wound care supplies. Pt was in no distress and A/O x4.

## 2023-04-17 NOTE — DISCHARGE SUMMARY
[facility]    Discharge Summary     Patient ID: Ayah Nair  :  [id number]   MRN: [id number]     ACCOUNT:  [de-identified]   Patient's PCP: VASU Lundberg  Admit Date: 2023   Discharge Date: 23  Length of Stay: 5  Code Status:  Full Code  Admitting Physician: Cat Richard MD  Discharge Physician: Tori Naranjo MD     Active Discharge Diagnoses:     Primary Problem  Ileus [facility])      [facility]    Diagnosis Date Noted    Chronic hepatitis C without hepatic coma ([address] [B18.2] 2023     Priority: Medium    S/P TIPS (transjugular intrahepatic portosystemic shunt) [Z95.828] 2022     Priority: Medium    Ascites due to alcoholic cirrhosis ([address] [J57.41] 2022     Priority: Medium    Delirium due to another medical condition [F05] 2023    Severe malnutrition ([address] [E43] 2023    Electrolyte imbalance [E87.8] 2023    Hyponatremia [E87.1] 2023    Colostomy present ([address] [Z93.3] 2023    Pain of upper abdomen [R10.10] 2023    Ileus ([address] [K56.7] 2023    Bowel perforation ([address] [K63.1] 2023    Cirrhosis ([address] [K74.60]     GERD (gastroesophageal reflux disease) [K21.9] 2012       Admission Condition:  fair     Discharged Condition: fair    Hospital Stay:     Hospital Course:  Ayah Nair is a 60 y.o. male who was admitted for the management of Ileus ([address] , presented to ER with Abdominal Pain, Dizziness, and GI Problem    58-year-old male was admitted recently with bowel perforation status post colectomy, colostomy in place now representing with abdominal pain possible ileus General surgery was consulted and recommended conservative management with watchful waiting  History of alcoholic cirrhosis with TIPS procedure  Patient maintained on lactulose to prevent hepatic encephalopathy  Hyponatremia and hypomagnesemia improved with hydration and

## 2023-04-17 NOTE — PLAN OF CARE
unsuccessful or patient reports new pain  Note: PT. Received pain meds in timely manner. Teach pt. Non-pharm. Methods to handle pain.
Completed     Problem: Self Harm/Suicidality  Goal: Will have no self-injury during hospital stay  Description: INTERVENTIONS:  1. Ensure constant observer at bedside with Q15M safety checks  2. Maintain a safe environment  3. Secure patient belongings  4. Ensure family/visitors adhere to safety recommendations  5. Ensure safety tray has been added to patient's diet order  6.   Every shift and PRN: Re-assess suicidal risk via Frequent Screener    Recent Flowsheet Documentation  Taken 4/17/2023 0334 by Teodora Marina RN  Will have no self-injury during hospital stay:   Ensure constant observer at bedside with Q15M safety checks   Maintain a safe environment

## 2023-04-17 NOTE — PROGRESS NOTES
04/17/23 1531   Encounter Summary   Encounter Overview/Reason   Encounter   Service Provided For: Patient   Referral/Consult From: Ralph   Last Encounter  04/17/23   Complexity of Encounter Low   Spiritual/Emotional needs   Type Spiritual Support   Rituals, Rites and Sacraments   Type Blessings  (Dilshad Bermeo 04/17/23)

## 2023-04-17 NOTE — PROGRESS NOTES
Attempted to call [facility] for report 15:20, no answer. Will try again shortly. Attempted to call [facility] for report for a second time 15:40, no answer. Will try again. 16:10 attempted to call [address] [place] [address] again with no answer.

## 2023-04-17 NOTE — PROGRESS NOTES
Pt. IV infiltrated and had to be remove. Writer informed the pt. that a new IV would have to be placed. Pt. refused and stated\" that he is leaving today so there is no need for one, they are just going to take it out later. \" Writer inform the pt. of the purpose of having a IV. Pt. Stated he understood. Writer notify Ericka FERRELL that the pt. Is refusing to have another IV placed.

## 2023-05-01 DIAGNOSIS — K70.30 ALCOHOLIC CIRRHOSIS, UNSPECIFIED WHETHER ASCITES PRESENT (HCC): ICD-10-CM

## 2023-05-01 RX ORDER — LACTULOSE 10 G/15ML
SOLUTION ORAL
Qty: 2700 ML | Refills: 3 | Status: SHIPPED | OUTPATIENT
Start: 2023-05-01

## 2023-05-05 ENCOUNTER — TELEPHONE (OUTPATIENT)
Dept: PRIMARY CARE CLINIC | Age: 63
End: 2023-05-05

## 2023-05-08 ENCOUNTER — APPOINTMENT (OUTPATIENT)
Dept: CT IMAGING | Age: 63
End: 2023-05-08
Payer: COMMERCIAL

## 2023-05-08 ENCOUNTER — HOSPITAL ENCOUNTER (EMERGENCY)
Age: 63
Discharge: HOME | End: 2023-05-08
Attending: EMERGENCY MEDICINE
Payer: COMMERCIAL

## 2023-05-08 VITALS
RESPIRATION RATE: 15 BRPM | OXYGEN SATURATION: 94 % | TEMPERATURE: 98.3 F | WEIGHT: 175 LBS | BODY MASS INDEX: 25.05 KG/M2 | DIASTOLIC BLOOD PRESSURE: 65 MMHG | SYSTOLIC BLOOD PRESSURE: 143 MMHG | HEIGHT: 70 IN | HEART RATE: 92 BPM

## 2023-05-08 DIAGNOSIS — R53.83 OTHER FATIGUE: Primary | ICD-10-CM

## 2023-05-08 DIAGNOSIS — R42 DIZZINESS: ICD-10-CM

## 2023-05-08 LAB
ABSOLUTE EOS #: 0 K/UL (ref 0–0.4)
ABSOLUTE LYMPH #: 0.4 K/UL (ref 1–4.8)
ABSOLUTE MONO #: 0.8 K/UL (ref 0.1–1.3)
ALBUMIN SERPL-MCNC: 2.7 G/DL (ref 3.5–5.2)
ALP SERPL-CCNC: 146 U/L (ref 40–129)
ALT SERPL-CCNC: 12 U/L (ref 5–41)
AMMONIA PLAS-SCNC: 36 UMOL/L (ref 16–60)
ANION GAP SERPL CALCULATED.3IONS-SCNC: 14 MMOL/L (ref 9–17)
AST SERPL-CCNC: 48 U/L
BASOPHILS # BLD: 0 % (ref 0–2)
BASOPHILS ABSOLUTE: 0 K/UL (ref 0–0.2)
BILIRUB SERPL-MCNC: 1.3 MG/DL (ref 0.3–1.2)
BUN SERPL-MCNC: 10 MG/DL (ref 8–23)
CALCIUM SERPL-MCNC: 8.9 MG/DL (ref 8.6–10.4)
CHLORIDE SERPL-SCNC: 108 MMOL/L (ref 98–107)
CO2 SERPL-SCNC: 19 MMOL/L (ref 20–31)
CREAT SERPL-MCNC: 0.59 MG/DL (ref 0.7–1.2)
EOSINOPHILS RELATIVE PERCENT: 0 % (ref 0–4)
GFR SERPL CREATININE-BSD FRML MDRD: >60 ML/MIN/1.73M2
GLUCOSE SERPL-MCNC: 102 MG/DL (ref 70–99)
HCT VFR BLD AUTO: 26 % (ref 41–53)
HGB BLD-MCNC: 8.3 G/DL (ref 13.5–17.5)
INR PPP: 1.3
LYMPHOCYTES # BLD: 6 % (ref 24–44)
MAGNESIUM SERPL-MCNC: 1.6 MG/DL (ref 1.6–2.6)
MCH RBC QN AUTO: 25 PG (ref 26–34)
MCHC RBC AUTO-ENTMCNC: 32 G/DL (ref 31–37)
MCV RBC AUTO: 78 FL (ref 80–100)
MONOCYTES # BLD: 14 % (ref 1–7)
PDW BLD-RTO: 20.2 % (ref 11.5–14.9)
PLATELET # BLD AUTO: 162 K/UL (ref 150–450)
PMV BLD AUTO: 6.6 FL (ref 6–12)
POTASSIUM SERPL-SCNC: 4 MMOL/L (ref 3.7–5.3)
PROT SERPL-MCNC: 6.3 G/DL (ref 6.4–8.3)
PROTHROMBIN TIME: 16 SEC (ref 11.8–14.6)
RBC # BLD: 3.34 M/UL (ref 4.5–5.9)
REASON FOR REJECTION: NORMAL
SEG NEUTROPHILS: 80 % (ref 36–66)
SEGMENTED NEUTROPHILS ABSOLUTE COUNT: 4.5 K/UL (ref 1.3–9.1)
SODIUM SERPL-SCNC: 141 MMOL/L (ref 135–144)
TROPONIN I SERPL DL<=0.01 NG/ML-MCNC: 35 NG/L (ref 0–22)
TROPONIN I SERPL DL<=0.01 NG/ML-MCNC: 35 NG/L (ref 0–22)
WBC # BLD AUTO: 5.7 K/UL (ref 3.5–11)
ZZ NTE CLEAN UP: ORDERED TEST: NORMAL
ZZ NTE WITH NAME CLEAN UP: SPECIMEN SOURCE: NORMAL

## 2023-05-08 PROCEDURE — 82140 ASSAY OF AMMONIA: CPT

## 2023-05-08 PROCEDURE — 99284 EMERGENCY DEPT VISIT MOD MDM: CPT

## 2023-05-08 PROCEDURE — 70450 CT HEAD/BRAIN W/O DYE: CPT

## 2023-05-08 PROCEDURE — 96365 THER/PROPH/DIAG IV INF INIT: CPT

## 2023-05-08 PROCEDURE — 2500000003 HC RX 250 WO HCPCS: Performed by: HEALTH CARE PROVIDER

## 2023-05-08 PROCEDURE — 36415 COLL VENOUS BLD VENIPUNCTURE: CPT

## 2023-05-08 PROCEDURE — 6370000000 HC RX 637 (ALT 250 FOR IP): Performed by: HEALTH CARE PROVIDER

## 2023-05-08 PROCEDURE — 83735 ASSAY OF MAGNESIUM: CPT

## 2023-05-08 PROCEDURE — 80053 COMPREHEN METABOLIC PANEL: CPT

## 2023-05-08 PROCEDURE — 84484 ASSAY OF TROPONIN QUANT: CPT

## 2023-05-08 PROCEDURE — 85610 PROTHROMBIN TIME: CPT

## 2023-05-08 PROCEDURE — 85025 COMPLETE CBC W/AUTO DIFF WBC: CPT

## 2023-05-08 PROCEDURE — 93005 ELECTROCARDIOGRAM TRACING: CPT | Performed by: HEALTH CARE PROVIDER

## 2023-05-08 PROCEDURE — 2580000003 HC RX 258: Performed by: HEALTH CARE PROVIDER

## 2023-05-08 RX ORDER — MAGNESIUM SULFATE HEPTAHYDRATE 40 MG/ML
2000 INJECTION, SOLUTION INTRAVENOUS ONCE
Status: COMPLETED | OUTPATIENT
Start: 2023-05-08 | End: 2023-05-08

## 2023-05-08 RX ORDER — ACETAMINOPHEN 325 MG/1
650 TABLET ORAL ONCE
Status: COMPLETED | OUTPATIENT
Start: 2023-05-08 | End: 2023-05-08

## 2023-05-08 RX ORDER — 0.9 % SODIUM CHLORIDE 0.9 %
1000 INTRAVENOUS SOLUTION INTRAVENOUS ONCE
Status: COMPLETED | OUTPATIENT
Start: 2023-05-08 | End: 2023-05-08

## 2023-05-08 RX ADMIN — MAGNESIUM SULFATE HEPTAHYDRATE 2000 MG: 40 INJECTION, SOLUTION INTRAVENOUS at 16:52

## 2023-05-08 RX ADMIN — SODIUM CHLORIDE 1000 ML: 9 INJECTION, SOLUTION INTRAVENOUS at 16:49

## 2023-05-08 RX ADMIN — ACETAMINOPHEN 650 MG: 325 TABLET ORAL at 16:53

## 2023-05-08 ASSESSMENT — PAIN DESCRIPTION - DESCRIPTORS: DESCRIPTORS: ACHING

## 2023-05-08 ASSESSMENT — PAIN - FUNCTIONAL ASSESSMENT: PAIN_FUNCTIONAL_ASSESSMENT: 0-10

## 2023-05-08 ASSESSMENT — PAIN SCALES - GENERAL: PAINLEVEL_OUTOF10: 9

## 2023-05-08 ASSESSMENT — PAIN DESCRIPTION - LOCATION
LOCATION: HEAD
LOCATION: HEAD

## 2023-05-08 NOTE — ED PROVIDER NOTES
[address]     Pt Name: Ashley Guan  MRN: [id number]  [place] 1960  Date of evaluation: 5/8/23       Ashley Guan is a 60 y.o. male who presents with Dizziness and Headache      MDM:   PE, malaise, headache this morning. No blood in stools or black stools. Has a colostomy right lower quadrant stoma is pink nontender. There is brown stool output in the bag. No focal neurologic deficits. Checking labs, ammonia level, brain CT, will reassess after that, possible admission for elevated ammonia levels. Vitals:   Vitals:    05/08/23 1457   BP: 137/66   Pulse: 97   Resp: 22   Temp: 98.3 °F (36.8 °C)   TempSrc: Oral   SpO2: 97%   Weight: 175 lb (79.4 kg)   Height: 5' 10\" (1.778 m)         I personally saw and examined the patient. I have reviewed and agree with the resident's findings, including all diagnostic interpretations and treatment plan as written. I was present for the key portions of any procedures performed and the inclusive time noted for any critical care statement.     Brigida Payan MD  Attending Emergency Physician            Brigida Payan MD  05/08/23 8869
FEROSUL 325 (65 Fe) MG tablet take 1 tablet by mouth twice a day 10/7/22   VASU Delgado   atorvastatin (LIPITOR) 20 MG tablet Take 1 tablet by mouth nightly 4/21/22   Veronique Ramirez MD       REVIEW OF SYSTEMS       Review of Systems   Constitutional:  Negative for chills and fever. HENT:  Negative for ear pain, hearing loss and sore throat. Eyes:  Negative for visual disturbance. Respiratory:  Negative for shortness of breath. Cardiovascular:  Negative for chest pain. Gastrointestinal:  Negative for abdominal pain, constipation, diarrhea, nausea and vomiting. Genitourinary:  Negative for difficulty urinating and dysuria. Musculoskeletal:  Negative for arthralgias and myalgias. Neurological:  Positive for dizziness. Negative for numbness. Psychiatric/Behavioral:  Negative for agitation and confusion. PHYSICAL EXAM      INITIAL VITALS:   BP (!) 143/65   Pulse 92   Temp 98.3 °F (36.8 °C) (Oral)   Resp 15   Ht 5' 10\" (1.778 m)   Wt 175 lb (79.4 kg)   SpO2 94%   BMI 25.11 kg/m²     Physical Exam  Vitals and nursing note reviewed. Constitutional:       General: He is not in acute distress. Appearance: He is well-developed. He is not diaphoretic. HENT:      Head: Normocephalic and atraumatic. Right Ear: External ear normal.      Left Ear: External ear normal.      Nose: Nose normal.   Eyes:      Conjunctiva/sclera: Conjunctivae normal.   Neck:      Trachea: No tracheal deviation. Cardiovascular:      Rate and Rhythm: Normal rate and regular rhythm. Heart sounds: Normal heart sounds. No murmur heard. No friction rub. No gallop. Pulmonary:      Effort: Pulmonary effort is normal. No respiratory distress. Breath sounds: Normal breath sounds. Abdominal:      General: Bowel sounds are normal.      Palpations: Abdomen is soft. Tenderness: There is no abdominal tenderness. Musculoskeletal:         General: No tenderness. Normal range of motion.

## 2023-05-08 NOTE — TELEPHONE ENCOUNTER
[employer] does not supply products to pts. Called pt to find out where he would like his DME order sent to. Lvm to call our office back. Order scanned into media.  (Physical order is at my desk.)

## 2023-05-08 NOTE — ED TRIAGE NOTES
Mode of arrival (squad #, walk in, police, etc) : walk-in        Chief complaint(s): headache, dizziness        Arrival Note (brief scenario, treatment PTA, etc). : Pt was brought in by EMS for potential high blood pressure, headache, brain fog and dizziness since 4am         C= \"Have you ever felt that you should Cut down on your drinking? \"  No  A= \"Have people Annoyed you by criticizing your drinking? \"  No  G= \"Have you ever felt bad or Guilty about your drinking? \"  No  E= \"Have you ever had a drink as an Eye-opener first thing in the morning to steady your nerves or to help a hangover? \"  No      Deferred []      Reason for deferring: N/A    *If yes to two or more: probable alcohol abuse. *

## 2023-05-08 NOTE — DISCHARGE INSTRUCTIONS
Thank you for visiting [facility] Emergency Department. You need to call VASU Bass to make an appointment as directed for follow up. Should you have any questions regarding your care or further treatment, please call [facility] at [phone]. Please make sure you are taking all of your home medications as prescribed. PLEASE RETURN TO THE ED IMMEDIATELY for worsening symptoms, or if you develop any concerning symptoms such as: high fever not relieved by tylenol and/or motrin, chills, shortness of breath, chest pain, persistent nausea and/or vomiting, numbness, weakness or tingling in the arms or legs or change in color of the extremities, changes in mental status, persistent headache, blurry vision, inability to urinate, unable to follow up with your physician, or other any other  Care or concern.

## 2023-05-08 NOTE — TELEPHONE ENCOUNTER
Called pt again. Pt was notified order will be sent to [address] [place] [address]. Order Rx, along with insurance and demos were faxed.

## 2023-05-09 LAB
EKG ATRIAL RATE: 96 BPM
EKG P AXIS: 73 DEGREES
EKG P-R INTERVAL: 154 MS
EKG Q-T INTERVAL: 392 MS
EKG QRS DURATION: 80 MS
EKG QTC CALCULATION (BAZETT): 495 MS
EKG R AXIS: 5 DEGREES
EKG T AXIS: -9 DEGREES
EKG VENTRICULAR RATE: 96 BPM

## 2023-05-09 PROCEDURE — 93010 ELECTROCARDIOGRAM REPORT: CPT | Performed by: INTERNAL MEDICINE

## 2023-05-09 ASSESSMENT — ENCOUNTER SYMPTOMS
ABDOMINAL PAIN: 0
SORE THROAT: 0
DIARRHEA: 0
NAUSEA: 0
VOMITING: 0
CONSTIPATION: 0
SHORTNESS OF BREATH: 0

## 2023-05-11 ENCOUNTER — OFFICE VISIT (OUTPATIENT)
Dept: PRIMARY CARE CLINIC | Age: 63
End: 2023-05-11
Payer: COMMERCIAL

## 2023-05-11 VITALS
OXYGEN SATURATION: 99 % | HEART RATE: 88 BPM | HEIGHT: 70 IN | BODY MASS INDEX: 25.2 KG/M2 | SYSTOLIC BLOOD PRESSURE: 118 MMHG | WEIGHT: 176 LBS | DIASTOLIC BLOOD PRESSURE: 70 MMHG

## 2023-05-11 DIAGNOSIS — Z09 HOSPITAL DISCHARGE FOLLOW-UP: Primary | ICD-10-CM

## 2023-05-11 DIAGNOSIS — C15.9 ESOPHAGEAL ADENOCARCINOMA (HCC): ICD-10-CM

## 2023-05-11 DIAGNOSIS — K74.69 DECOMPENSATED LIVER DISEASE (HCC): ICD-10-CM

## 2023-05-11 DIAGNOSIS — R41.89 BRAIN FOG: ICD-10-CM

## 2023-05-11 DIAGNOSIS — R42 DIZZINESS: ICD-10-CM

## 2023-05-11 PROCEDURE — 99214 OFFICE O/P EST MOD 30 MIN: CPT | Performed by: PHYSICIAN ASSISTANT

## 2023-05-11 PROCEDURE — 3078F DIAST BP <80 MM HG: CPT | Performed by: PHYSICIAN ASSISTANT

## 2023-05-11 PROCEDURE — 3074F SYST BP LT 130 MM HG: CPT | Performed by: PHYSICIAN ASSISTANT

## 2023-05-11 PROCEDURE — 1111F DSCHRG MED/CURRENT MED MERGE: CPT | Performed by: PHYSICIAN ASSISTANT

## 2023-05-11 RX ORDER — SPIRONOLACTONE 25 MG/1
50 TABLET ORAL DAILY
Qty: 30 TABLET | Refills: 0 | Status: SHIPPED | OUTPATIENT
Start: 2023-05-11

## 2023-05-11 RX ORDER — PANTOPRAZOLE SODIUM 40 MG/1
40 TABLET, DELAYED RELEASE ORAL DAILY
Qty: 30 TABLET | Refills: 0 | Status: SHIPPED | OUTPATIENT
Start: 2023-05-11

## 2023-05-11 NOTE — PROGRESS NOTES
counseling/discussion    ACP (advance care planning)    Palliative care encounter    S/P TIPS (transjugular intrahepatic portosystemic shunt)    Acute metabolic encephalopathy    Lezama's esophagus with dysplasia    Mastoid disorder, bilateral    Acute deep vein thrombosis (DVT) of brachial vein of right upper extremity (HCC)    Chronic hepatitis C without hepatic coma (HCC)    Swelling of joint of upper arm, right    Anasarca    Lightheadedness    Alcohol withdrawal syndrome, with delirium (HCC)    Hemorrhage of rectum and anus    Bowel perforation (HCC)    Electrolyte imbalance    Hyponatremia    Colostomy present ([address]    Pain of upper abdomen    Ileus ([address]    Severe malnutrition ([address]    Delirium due to another medical condition       Medications listed as ordered at the time of discharge from hospital     Medication List            Accurate as of May 11, 2023  5:00 PM. If you have any questions, ask your nurse or doctor.                 CONTINUE taking these medications      atorvastatin 20 MG tablet  Commonly known as: LIPITOR  Take 1 tablet by mouth nightly     FeroSul 325 (65 Fe) MG tablet  Generic drug: ferrous sulfate  take 1 tablet by mouth twice a day     furosemide 40 MG tablet  Commonly known as: LASIX  Take 0.5 tablets by mouth daily     lactulose 10 GM/15ML solution  Commonly known as: CHRONULAC  take 30 milliliters by mouth three times a day     pantoprazole 40 MG tablet  Commonly known as: PROTONIX  Take 1 tablet by mouth daily     spironolactone 25 MG tablet  Commonly known as: ALDACTONE  Take 2 tablets by mouth daily     sucralfate 1 GM tablet  Commonly known as: CARAFATE  Take 1 tablet by mouth 4 times daily     tamsulosin 0.4 MG capsule  Commonly known as: FLOMAX  Take 1 capsule by mouth daily     therapeutic multivitamin-minerals tablet  Take 1 tablet by mouth daily               Where to Get Your Medications        These medications were sent to [address] [place] [address] #[id number] - [address] [place] [address]

## 2023-05-15 ENCOUNTER — TELEPHONE (OUTPATIENT)
Dept: PRIMARY CARE CLINIC | Age: 63
End: 2023-05-15

## 2023-05-15 DIAGNOSIS — K74.69 DECOMPENSATED LIVER DISEASE (HCC): ICD-10-CM

## 2023-05-15 RX ORDER — SPIRONOLACTONE 25 MG/1
50 TABLET ORAL DAILY
Qty: 30 TABLET | Refills: 0 | OUTPATIENT
Start: 2023-05-15

## 2023-05-15 RX ORDER — ONDANSETRON 4 MG/1
4 TABLET, FILM COATED ORAL 3 TIMES DAILY PRN
Qty: 30 TABLET | Refills: 0 | Status: SHIPPED | OUTPATIENT
Start: 2023-05-15

## 2023-05-15 NOTE — TELEPHONE ENCOUNTER
Maria Ines Jackson at [address] [place] [address] advised per [employer] this request is to go to GI provider. They will contact them.

## 2023-05-15 NOTE — TELEPHONE ENCOUNTER
[facility] called states that they did not get the prescription for the Spironolactone - asking for this to be resent so they can fill for patient. Rx pended.

## 2023-05-15 NOTE — TELEPHONE ENCOUNTER
[facility] states patient is also requesting Ondansetron - states he is having nausea & this was discussed at appointment with you. Please advise.

## 2023-05-19 ENCOUNTER — OFFICE VISIT (OUTPATIENT)
Dept: PRIMARY CARE CLINIC | Age: 63
End: 2023-05-19
Payer: COMMERCIAL

## 2023-05-19 VITALS
DIASTOLIC BLOOD PRESSURE: 74 MMHG | HEIGHT: 70 IN | BODY MASS INDEX: 25.62 KG/M2 | OXYGEN SATURATION: 97 % | SYSTOLIC BLOOD PRESSURE: 116 MMHG | WEIGHT: 179 LBS | HEART RATE: 71 BPM

## 2023-05-19 DIAGNOSIS — D69.6 THROMBOCYTOPENIA (HCC): ICD-10-CM

## 2023-05-19 DIAGNOSIS — C15.9 ESOPHAGEAL ADENOCARCINOMA (HCC): ICD-10-CM

## 2023-05-19 DIAGNOSIS — R20.0 NUMBNESS AND TINGLING IN LEFT HAND: ICD-10-CM

## 2023-05-19 DIAGNOSIS — R20.2 NUMBNESS AND TINGLING IN LEFT HAND: ICD-10-CM

## 2023-05-19 DIAGNOSIS — K74.69 DECOMPENSATED LIVER DISEASE (HCC): Primary | ICD-10-CM

## 2023-05-19 PROCEDURE — 99214 OFFICE O/P EST MOD 30 MIN: CPT | Performed by: PHYSICIAN ASSISTANT

## 2023-05-19 PROCEDURE — 3078F DIAST BP <80 MM HG: CPT | Performed by: PHYSICIAN ASSISTANT

## 2023-05-19 PROCEDURE — 3074F SYST BP LT 130 MM HG: CPT | Performed by: PHYSICIAN ASSISTANT

## 2023-05-19 ASSESSMENT — ENCOUNTER SYMPTOMS
COUGH: 0
CHEST TIGHTNESS: 0
CONSTIPATION: 0
ABDOMINAL DISTENTION: 1
DIARRHEA: 0
ABDOMINAL PAIN: 1
SHORTNESS OF BREATH: 0
NAUSEA: 1
VOMITING: 0

## 2023-05-19 NOTE — PROGRESS NOTES
[facility]  [address] [address]  [facility] [address]  Dept: [address] Surinder Douglas is a 60 y.o. male Established patient, who presents today for his medical conditions/complaints as noted below. Chief Complaint   Patient presents with    Swelling     Left hand numbness and swelling x 1 year        HPI:     HPI: The patient is a pleasant 26-year-old male who presents today with concerns of left hand numbness and swelling over the last year. Patient has significant past medical history of possible esophageal cancer has been negative on biopsies. Patient has liver failure with multiple GI bleeds in the past.  Patient currently taking lactulose, Lasix, spironolactone, protonix and carafate. Has noticed swelling and thining. He has no  strength.      Reviewed prior notes None  Reviewed previous Labs    LDL Cholesterol (mg/dL)   Date Value   08/19/2021 46   07/03/2020 103   02/02/2019 129       (goal LDL is <100)   AST (U/L)   Date Value   05/08/2023 48 (H)     ALT (U/L)   Date Value   05/08/2023 12     BUN (mg/dL)   Date Value   05/08/2023 10     Hemoglobin A1C (%)   Date Value   01/01/2023 4.5     TSH (uIU/mL)   Date Value   04/12/2023 2.15     BP Readings from Last 3 Encounters:   05/19/23 116/74   05/11/23 118/70   05/08/23 (!) 143/65          (goal 120/80)  Hemoglobin A1C   Date Value Ref Range Status   01/01/2023 4.5 4.0 - 6.0 % Final     Past Medical History:   Diagnosis Date    Abnormal EKG     Acute deep vein thrombosis (DVT) of brachial vein of right upper extremity ([address] 01/07/2023    Also- Superficial venous thrombosis of the cephalic vein in the forearm    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 Makayla Irwin, orthopedic, every 3-4 months, gets steroid injection    Lezama esophagus     Bleeding gastric varices 12/29/2022    BPH

## 2023-05-22 ENCOUNTER — TELEPHONE (OUTPATIENT)
Dept: PRIMARY CARE CLINIC | Age: 63
End: 2023-05-22

## 2023-05-22 ENCOUNTER — HOSPITAL ENCOUNTER (OUTPATIENT)
Dept: CT IMAGING | Age: 63
Discharge: HOME | End: 2023-05-24
Payer: COMMERCIAL

## 2023-05-22 DIAGNOSIS — C15.9 ESOPHAGEAL ADENOCARCINOMA (HCC): ICD-10-CM

## 2023-05-22 PROCEDURE — 71260 CT THORAX DX C+: CPT

## 2023-05-22 PROCEDURE — 2500000003 HC RX 250 WO HCPCS: Performed by: INTERNAL MEDICINE

## 2023-05-22 PROCEDURE — 6360000004 HC RX CONTRAST MEDICATION: Performed by: INTERNAL MEDICINE

## 2023-05-22 PROCEDURE — 2580000003 HC RX 258: Performed by: INTERNAL MEDICINE

## 2023-05-22 RX ORDER — 0.9 % SODIUM CHLORIDE 0.9 %
100 INTRAVENOUS SOLUTION INTRAVENOUS ONCE
Status: COMPLETED | OUTPATIENT
Start: 2023-05-22 | End: 2023-05-22

## 2023-05-22 RX ORDER — SODIUM CHLORIDE 0.9 % (FLUSH) 0.9 %
10 SYRINGE (ML) INJECTION PRN
Status: DISCONTINUED | OUTPATIENT
Start: 2023-05-22 | End: 2023-05-25 | Stop reason: HOSPADM

## 2023-05-22 RX ADMIN — SODIUM CHLORIDE 100 ML: 9 INJECTION, SOLUTION INTRAVENOUS at 13:32

## 2023-05-22 RX ADMIN — IOPAMIDOL 75 ML: 755 INJECTION, SOLUTION INTRAVENOUS at 13:32

## 2023-05-22 RX ADMIN — BARIUM SULFATE 450 ML: 20 SUSPENSION ORAL at 13:31

## 2023-05-22 RX ADMIN — SODIUM CHLORIDE, PRESERVATIVE FREE 10 ML: 5 INJECTION INTRAVENOUS at 13:32

## 2023-05-22 NOTE — TELEPHONE ENCOUNTER
Patient called office states he received ostomy bags from [facility] but did not receive the tape part that sticks to his skin.      Patient is running out and will need supplies     Please order     Fax to [facility]     May need to hand write if cannot find in epic

## 2023-05-22 NOTE — TELEPHONE ENCOUNTER
Called pt back to clarify which style he has     Patient does not know the name - I am assuming patient is needing two-Piece Stomahesive Skin Barrier? (the part that sticks to skin w/ ring)

## 2023-05-26 NOTE — TELEPHONE ENCOUNTER
Okay to have patient go to walk in or ER for ostomy care. If not he can try to buy them out of pocket online if they will ship in time.

## 2023-05-26 NOTE — TELEPHONE ENCOUNTER
Pt calling again. States that he has more than enough ostomy pouches. On the 22nd. Jewell faxed over the Two-Piece Stomahesive skin Barrier  to [facility] on [address] and they said that they never got it, but we received confirmation that they did get it. Was told that the reference # needs to be on it and that pt should have it, otherwise can't process rx. Pt does not have that #. I tried calling Dr. Roldan Can office, but they closed at 11:00 today. Thought that they may have the reference #. Pt will be out on Sunday and not sure what to do. Asking what you recommend he do?

## 2023-05-30 ENCOUNTER — TELEPHONE (OUTPATIENT)
Dept: ONCOLOGY | Age: 63
End: 2023-05-30

## 2023-05-30 ENCOUNTER — OFFICE VISIT (OUTPATIENT)
Dept: ONCOLOGY | Age: 63
End: 2023-05-30
Payer: COMMERCIAL

## 2023-05-30 ENCOUNTER — HOSPITAL ENCOUNTER (OUTPATIENT)
Age: 63
Discharge: HOME | End: 2023-05-30
Payer: COMMERCIAL

## 2023-05-30 VITALS
RESPIRATION RATE: 16 BRPM | HEART RATE: 85 BPM | BODY MASS INDEX: 25.66 KG/M2 | SYSTOLIC BLOOD PRESSURE: 134 MMHG | TEMPERATURE: 99.3 F | WEIGHT: 178.8 LBS | DIASTOLIC BLOOD PRESSURE: 76 MMHG

## 2023-05-30 DIAGNOSIS — C15.9 ESOPHAGEAL ADENOCARCINOMA (HCC): ICD-10-CM

## 2023-05-30 DIAGNOSIS — C15.9 ESOPHAGEAL ADENOCARCINOMA (HCC): Primary | ICD-10-CM

## 2023-05-30 LAB
ALBUMIN SERPL-MCNC: 3.1 G/DL (ref 3.5–5.2)
ALBUMIN/GLOB SERPL: 0.8 {RATIO} (ref 1–2.5)
ALP SERPL-CCNC: 154 U/L (ref 40–129)
ALT SERPL-CCNC: 13 U/L (ref 5–41)
ANION GAP SERPL CALCULATED.3IONS-SCNC: 13 MMOL/L (ref 9–17)
AST SERPL-CCNC: 50 U/L
BASOPHILS # BLD: 0 K/UL (ref 0–0.2)
BASOPHILS NFR BLD: 0 % (ref 0–2)
BILIRUB SERPL-MCNC: 1.2 MG/DL (ref 0.3–1.2)
BUN SERPL-MCNC: 12 MG/DL (ref 8–23)
CALCIUM SERPL-MCNC: 8.6 MG/DL (ref 8.6–10.4)
CEA SERPL-MCNC: 7.6 NG/ML
CHLORIDE SERPL-SCNC: 102 MMOL/L (ref 98–107)
CO2 SERPL-SCNC: 20 MMOL/L (ref 20–31)
CREAT SERPL-MCNC: 0.86 MG/DL (ref 0.7–1.2)
EOSINOPHIL # BLD: 0.05 K/UL (ref 0–0.4)
EOSINOPHILS RELATIVE PERCENT: 1 % (ref 1–4)
ERYTHROCYTE [DISTWIDTH] IN BLOOD BY AUTOMATED COUNT: 20.9 % (ref 12.5–15.4)
GFR SERPL CREATININE-BSD FRML MDRD: >60 ML/MIN/1.73M2
GLUCOSE SERPL-MCNC: 117 MG/DL (ref 70–99)
HCT VFR BLD AUTO: 31.8 % (ref 41–53)
HGB BLD-MCNC: 10.2 G/DL (ref 13.5–17.5)
LYMPHOCYTES # BLD: 7 % (ref 24–44)
LYMPHOCYTES NFR BLD: 0.35 K/UL (ref 1–4.8)
MCH RBC QN AUTO: 26.6 PG (ref 26–34)
MCHC RBC AUTO-ENTMCNC: 32 G/DL (ref 31–37)
MCV RBC AUTO: 83.2 FL (ref 80–100)
MONOCYTES NFR BLD: 0.55 K/UL (ref 0.1–0.8)
MONOCYTES NFR BLD: 11 % (ref 1–7)
MORPHOLOGY: ABNORMAL
NEUTROPHILS NFR BLD: 81 % (ref 36–66)
NEUTS SEG NFR BLD: 4.05 K/UL (ref 1.8–7.7)
PLATELET # BLD AUTO: 146 K/UL (ref 140–450)
PMV BLD AUTO: 7.4 FL (ref 6–12)
POTASSIUM SERPL-SCNC: 3.9 MMOL/L (ref 3.7–5.3)
PROT SERPL-MCNC: 6.8 G/DL (ref 6.4–8.3)
RBC # BLD AUTO: 3.82 M/UL (ref 4.5–5.9)
SODIUM SERPL-SCNC: 135 MMOL/L (ref 135–144)
WBC OTHER # BLD: 5 K/UL (ref 3.5–11)

## 2023-05-30 PROCEDURE — 3078F DIAST BP <80 MM HG: CPT | Performed by: INTERNAL MEDICINE

## 2023-05-30 PROCEDURE — 36415 COLL VENOUS BLD VENIPUNCTURE: CPT

## 2023-05-30 PROCEDURE — 99214 OFFICE O/P EST MOD 30 MIN: CPT | Performed by: INTERNAL MEDICINE

## 2023-05-30 PROCEDURE — 82378 CARCINOEMBRYONIC ANTIGEN: CPT

## 2023-05-30 PROCEDURE — 99211 OFF/OP EST MAY X REQ PHY/QHP: CPT | Performed by: INTERNAL MEDICINE

## 2023-05-30 PROCEDURE — 3074F SYST BP LT 130 MM HG: CPT | Performed by: INTERNAL MEDICINE

## 2023-05-30 PROCEDURE — 85025 COMPLETE CBC W/AUTO DIFF WBC: CPT

## 2023-05-30 PROCEDURE — 80053 COMPREHEN METABOLIC PANEL: CPT

## 2023-05-30 NOTE — TELEPHONE ENCOUNTER
AVS from 5/30/23      RTC in 3 months with labs 3 days prior    Rv sched for 8/29 @ 11:45 am     Labs done a few days prior to appt    Pt was given AVS and appointment schedule    Electronically signed by Johnathan Yancey on 5/30/2023 at 2:58 PM

## 2023-05-30 NOTE — PROGRESS NOTES
Minutes of Exercise per Session: 0 min   Stress: Not on file   Social Connections: Not on file   Intimate Partner Violence: Not on file   Housing Stability: Not on file       Family History   Problem Relation Age of Onset    Cancer Mother         pancreatic    Cancer Father         bone    Diabetes Sister     Asthma Brother     Allergies Brother         MULTIPLE        REVIEW OF SYSTEM:     Constitutional: No fever or chills. No night sweats, no weight loss   Eyes: No eye discharge, double vision, or eye pain   HEENT: negative for sore mouth, sore throat, hoarseness and voice change   Respiratory: negative for cough , sputum, dyspnea, wheezing, hemoptysis, chest pain   Cardiovascular: negative for chest pain, dyspnea, palpitations, orthopnea, PND   Gastrointestinal: negative for nausea, vomiting, diarrhea, constipation, abdominal pain, Dysphagia, hematemesis and hematochezia   Genitourinary: negative for frequency, dysuria, nocturia, urinary incontinence, and hematuria   Integument: negative for rash, skin lesions, bruises.    Hematologic/Lymphatic: negative for easy bruising, bleeding, lymphadenopathy, petechiae and swelling/edema   Endocrine: negative for heat or cold intolerance, tremor, weight changes, change in bowel habits and hair loss   Musculoskeletal: negative for myalgias, arthralgias, pain, joint swelling,and bone pain   Neurological: negative for headaches, dizziness, seizures, weakness, numbness       OBJECTIVE:         Vitals:    05/30/23 1155   BP: 134/76   Pulse: 85   Resp: 16   Temp: 99.3 °F (37.4 °C)       PHYSICAL EXAM:   General appearance - well appearing, no in pain or distress   Mental status - alert and cooperative   Eyes - pupils equal and reactive, extraocular eye movements intact   Ears - bilateral TM's and external ear canals normal   Mouth - mucous membranes moist, pharynx normal without lesions   Neck - supple, no significant adenopathy   Lymphatics - no palpable lymphadenopathy, no

## 2023-06-06 ENCOUNTER — OFFICE VISIT (OUTPATIENT)
Dept: GASTROENTEROLOGY | Age: 63
End: 2023-06-06
Payer: COMMERCIAL

## 2023-06-06 VITALS
WEIGHT: 184 LBS | DIASTOLIC BLOOD PRESSURE: 76 MMHG | SYSTOLIC BLOOD PRESSURE: 154 MMHG | BODY MASS INDEX: 26.34 KG/M2 | HEIGHT: 70 IN

## 2023-06-06 DIAGNOSIS — K70.30 ALCOHOLIC CIRRHOSIS, UNSPECIFIED WHETHER ASCITES PRESENT (HCC): ICD-10-CM

## 2023-06-06 DIAGNOSIS — C15.5 MALIGNANT NEOPLASM OF LOWER THIRD OF ESOPHAGUS (HCC): ICD-10-CM

## 2023-06-06 DIAGNOSIS — K70.31 ASCITES DUE TO ALCOHOLIC CIRRHOSIS (HCC): Primary | ICD-10-CM

## 2023-06-06 DIAGNOSIS — K74.69 DECOMPENSATED LIVER DISEASE (HCC): ICD-10-CM

## 2023-06-06 PROCEDURE — 3078F DIAST BP <80 MM HG: CPT | Performed by: INTERNAL MEDICINE

## 2023-06-06 PROCEDURE — 99213 OFFICE O/P EST LOW 20 MIN: CPT | Performed by: INTERNAL MEDICINE

## 2023-06-06 PROCEDURE — 3077F SYST BP >= 140 MM HG: CPT | Performed by: INTERNAL MEDICINE

## 2023-06-06 RX ORDER — PANTOPRAZOLE SODIUM 40 MG/1
TABLET, DELAYED RELEASE ORAL
Qty: 30 TABLET | Refills: 0 | Status: SHIPPED | OUTPATIENT
Start: 2023-06-06

## 2023-06-06 ASSESSMENT — ENCOUNTER SYMPTOMS
SHORTNESS OF BREATH: 1
ABDOMINAL DISTENTION: 1
BACK PAIN: 1
ALLERGIC/IMMUNOLOGIC NEGATIVE: 1

## 2023-06-06 NOTE — PROGRESS NOTES
have reviewed and agree with the ROS entered by the MA/LPN. Note is dictated utilizing voice recognition software. Unfortunately this leads to occasional typographical errors.  Please contact our office if you have any questions        Cristóbal Craft MD,FACP, [facility]  Board Certified in Gastroenterology and [address] [place] [address] Gastroenterology  Office #: [phone]

## 2023-06-06 NOTE — TELEPHONE ENCOUNTER
LAST VISIT:   5/19/2023     Future Appointments   Date Time Provider Carolyn English   6/6/2023  2:00 PM Sariah French MD [facility] GI [facility]   8/21/2023  1:30 PM VASU Salinas [facility]   8/29/2023 11:45 AM Luz Childress MD [facility] CANCER [facility]   9/11/2023  4:30 PM [facility] EMG  [facility]   9/11/2023  4:30 PM Jeri Lai MD [address]

## 2023-06-30 ENCOUNTER — HOSPITAL ENCOUNTER (INPATIENT)
Age: 63
LOS: 1 days | Discharge: HOME | DRG: 684 | End: 2023-07-02
Attending: EMERGENCY MEDICINE | Admitting: INTERNAL MEDICINE
Payer: COMMERCIAL

## 2023-06-30 DIAGNOSIS — N17.9 AKI (ACUTE KIDNEY INJURY) (HCC): ICD-10-CM

## 2023-06-30 DIAGNOSIS — E83.42 HYPOMAGNESEMIA: Primary | ICD-10-CM

## 2023-06-30 DIAGNOSIS — E86.0 DEHYDRATION: ICD-10-CM

## 2023-06-30 LAB
ALBUMIN SERPL-MCNC: 3.8 G/DL (ref 3.5–5.2)
ALP SERPL-CCNC: 154 U/L (ref 40–129)
ALT SERPL-CCNC: 19 U/L (ref 5–41)
ANION GAP SERPL CALCULATED.3IONS-SCNC: 17 MMOL/L (ref 9–17)
AST SERPL-CCNC: 83 U/L
BASOPHILS # BLD: 0.07 K/UL (ref 0–0.2)
BASOPHILS NFR BLD: 1 % (ref 0–2)
BILIRUB SERPL-MCNC: 2.6 MG/DL (ref 0.3–1.2)
BUN SERPL-MCNC: 17 MG/DL (ref 8–23)
CALCIUM SERPL-MCNC: 9.5 MG/DL (ref 8.6–10.4)
CHLORIDE SERPL-SCNC: 97 MMOL/L (ref 98–107)
CO2 SERPL-SCNC: 22 MMOL/L (ref 20–31)
CREAT SERPL-MCNC: 1.29 MG/DL (ref 0.7–1.2)
EOSINOPHIL # BLD: 0 K/UL (ref 0–0.4)
EOSINOPHILS RELATIVE PERCENT: 0 % (ref 0–4)
ERYTHROCYTE [DISTWIDTH] IN BLOOD BY AUTOMATED COUNT: 18.8 % (ref 11.5–14.9)
GFR SERPL CREATININE-BSD FRML MDRD: >60 ML/MIN/1.73M2
GLUCOSE SERPL-MCNC: 105 MG/DL (ref 70–99)
HCT VFR BLD AUTO: 36.3 % (ref 41–53)
HGB BLD-MCNC: 11.8 G/DL (ref 13.5–17.5)
LACTATE BLDV-SCNC: 2.1 MMOL/L (ref 0.5–2.2)
LYMPHOCYTES # BLD: 4 % (ref 24–44)
LYMPHOCYTES NFR BLD: 0.28 K/UL (ref 1–4.8)
MAGNESIUM SERPL-MCNC: 1.3 MG/DL (ref 1.6–2.6)
MCH RBC QN AUTO: 28.1 PG (ref 26–34)
MCHC RBC AUTO-ENTMCNC: 32.5 G/DL (ref 31–37)
MCV RBC AUTO: 86.5 FL (ref 80–100)
MONOCYTES NFR BLD: 0.97 K/UL (ref 0.1–1.3)
MONOCYTES NFR BLD: 14 % (ref 1–7)
MORPHOLOGY: NORMAL
NEUTROPHILS NFR BLD: 81 % (ref 36–66)
NEUTS SEG NFR BLD: 5.58 K/UL (ref 1.3–9.1)
PLATELET # BLD AUTO: 110 K/UL (ref 150–450)
PMV BLD AUTO: 7.5 FL (ref 6–12)
POTASSIUM SERPL-SCNC: 4.1 MMOL/L (ref 3.7–5.3)
PROT SERPL-MCNC: 7.6 G/DL (ref 6.4–8.3)
RBC # BLD AUTO: 4.2 M/UL (ref 4.5–5.9)
SODIUM SERPL-SCNC: 136 MMOL/L (ref 135–144)
TROPONIN I SERPL HS-MCNC: 28 NG/L (ref 0–22)
TROPONIN I SERPL HS-MCNC: 31 NG/L (ref 0–22)
WBC OTHER # BLD: 6.9 K/UL (ref 3.5–11)

## 2023-06-30 PROCEDURE — 96372 THER/PROPH/DIAG INJ SC/IM: CPT

## 2023-06-30 PROCEDURE — 2580000003 HC RX 258: Performed by: EMERGENCY MEDICINE

## 2023-06-30 PROCEDURE — 2500000003 HC RX 250 WO HCPCS: Performed by: EMERGENCY MEDICINE

## 2023-06-30 PROCEDURE — 96361 HYDRATE IV INFUSION ADD-ON: CPT

## 2023-06-30 PROCEDURE — 96365 THER/PROPH/DIAG IV INF INIT: CPT

## 2023-06-30 PROCEDURE — 80053 COMPREHEN METABOLIC PANEL: CPT

## 2023-06-30 PROCEDURE — 99285 EMERGENCY DEPT VISIT HI MDM: CPT

## 2023-06-30 PROCEDURE — G0378 HOSPITAL OBSERVATION PER HR: HCPCS

## 2023-06-30 PROCEDURE — 83605 ASSAY OF LACTIC ACID: CPT

## 2023-06-30 PROCEDURE — 6370000000 HC RX 637 (ALT 250 FOR IP): Performed by: INTERNAL MEDICINE

## 2023-06-30 PROCEDURE — 83735 ASSAY OF MAGNESIUM: CPT

## 2023-06-30 PROCEDURE — 6370000000 HC RX 637 (ALT 250 FOR IP): Performed by: NURSE PRACTITIONER

## 2023-06-30 PROCEDURE — 6370000000 HC RX 637 (ALT 250 FOR IP): Performed by: EMERGENCY MEDICINE

## 2023-06-30 PROCEDURE — 85027 COMPLETE CBC AUTOMATED: CPT

## 2023-06-30 PROCEDURE — 84484 ASSAY OF TROPONIN QUANT: CPT

## 2023-06-30 PROCEDURE — 93005 ELECTROCARDIOGRAM TRACING: CPT | Performed by: EMERGENCY MEDICINE

## 2023-06-30 PROCEDURE — 96366 THER/PROPH/DIAG IV INF ADDON: CPT

## 2023-06-30 PROCEDURE — 36415 COLL VENOUS BLD VENIPUNCTURE: CPT

## 2023-06-30 PROCEDURE — 6360000002 HC RX W HCPCS: Performed by: INTERNAL MEDICINE

## 2023-06-30 RX ORDER — SODIUM CHLORIDE 0.9 % (FLUSH) 0.9 %
5-40 SYRINGE (ML) INJECTION EVERY 12 HOURS SCHEDULED
Status: DISCONTINUED | OUTPATIENT
Start: 2023-06-30 | End: 2023-07-02 | Stop reason: HOSPADM

## 2023-06-30 RX ORDER — FUROSEMIDE 40 MG/1
40 TABLET ORAL 2 TIMES DAILY
COMMUNITY

## 2023-06-30 RX ORDER — ONDANSETRON 2 MG/ML
4 INJECTION INTRAMUSCULAR; INTRAVENOUS EVERY 6 HOURS PRN
Status: DISCONTINUED | OUTPATIENT
Start: 2023-06-30 | End: 2023-07-02 | Stop reason: HOSPADM

## 2023-06-30 RX ORDER — POTASSIUM CHLORIDE 7.45 MG/ML
10 INJECTION INTRAVENOUS PRN
Status: DISCONTINUED | OUTPATIENT
Start: 2023-06-30 | End: 2023-07-02 | Stop reason: HOSPADM

## 2023-06-30 RX ORDER — FERROUS SULFATE 325(65) MG
325 TABLET ORAL 2 TIMES DAILY
Status: DISCONTINUED | OUTPATIENT
Start: 2023-06-30 | End: 2023-07-02 | Stop reason: HOSPADM

## 2023-06-30 RX ORDER — SODIUM CHLORIDE 0.9 % (FLUSH) 0.9 %
10 SYRINGE (ML) INJECTION PRN
Status: DISCONTINUED | OUTPATIENT
Start: 2023-06-30 | End: 2023-07-02 | Stop reason: HOSPADM

## 2023-06-30 RX ORDER — ACETAMINOPHEN 650 MG/1
650 SUPPOSITORY RECTAL EVERY 6 HOURS PRN
Status: DISCONTINUED | OUTPATIENT
Start: 2023-06-30 | End: 2023-07-02 | Stop reason: HOSPADM

## 2023-06-30 RX ORDER — SPIRONOLACTONE 25 MG/1
50 TABLET ORAL DAILY
Status: DISCONTINUED | OUTPATIENT
Start: 2023-06-30 | End: 2023-07-02 | Stop reason: HOSPADM

## 2023-06-30 RX ORDER — MAGNESIUM SULFATE HEPTAHYDRATE 40 MG/ML
2000 INJECTION, SOLUTION INTRAVENOUS ONCE
Status: COMPLETED | OUTPATIENT
Start: 2023-06-30 | End: 2023-06-30

## 2023-06-30 RX ORDER — SODIUM CHLORIDE 9 MG/ML
INJECTION, SOLUTION INTRAVENOUS PRN
Status: DISCONTINUED | OUTPATIENT
Start: 2023-06-30 | End: 2023-07-02 | Stop reason: HOSPADM

## 2023-06-30 RX ORDER — LORAZEPAM 1 MG/1
1 TABLET ORAL ONCE
Status: COMPLETED | OUTPATIENT
Start: 2023-06-30 | End: 2023-06-30

## 2023-06-30 RX ORDER — LACTULOSE 10 G/15ML
20 SOLUTION ORAL 3 TIMES DAILY
Status: DISCONTINUED | OUTPATIENT
Start: 2023-06-30 | End: 2023-07-02 | Stop reason: HOSPADM

## 2023-06-30 RX ORDER — PANTOPRAZOLE SODIUM 40 MG/1
40 TABLET, DELAYED RELEASE ORAL DAILY
Status: DISCONTINUED | OUTPATIENT
Start: 2023-06-30 | End: 2023-07-02 | Stop reason: HOSPADM

## 2023-06-30 RX ORDER — ACETAMINOPHEN 325 MG/1
650 TABLET ORAL EVERY 6 HOURS PRN
Status: DISCONTINUED | OUTPATIENT
Start: 2023-06-30 | End: 2023-07-02 | Stop reason: HOSPADM

## 2023-06-30 RX ORDER — MAGNESIUM SULFATE 1 G/100ML
1000 INJECTION INTRAVENOUS PRN
Status: DISCONTINUED | OUTPATIENT
Start: 2023-06-30 | End: 2023-07-02 | Stop reason: HOSPADM

## 2023-06-30 RX ORDER — POLYETHYLENE GLYCOL 3350 17 G/17G
17 POWDER, FOR SOLUTION ORAL DAILY PRN
Status: DISCONTINUED | OUTPATIENT
Start: 2023-06-30 | End: 2023-07-02 | Stop reason: HOSPADM

## 2023-06-30 RX ORDER — 0.9 % SODIUM CHLORIDE 0.9 %
1000 INTRAVENOUS SOLUTION INTRAVENOUS ONCE
Status: COMPLETED | OUTPATIENT
Start: 2023-06-30 | End: 2023-06-30

## 2023-06-30 RX ORDER — ACETAMINOPHEN 325 MG/1
650 TABLET ORAL EVERY 6 HOURS PRN
COMMUNITY

## 2023-06-30 RX ORDER — POTASSIUM CHLORIDE 20 MEQ/1
40 TABLET, EXTENDED RELEASE ORAL PRN
Status: DISCONTINUED | OUTPATIENT
Start: 2023-06-30 | End: 2023-07-02 | Stop reason: HOSPADM

## 2023-06-30 RX ORDER — ONDANSETRON 4 MG/1
4 TABLET, ORALLY DISINTEGRATING ORAL EVERY 8 HOURS PRN
Status: DISCONTINUED | OUTPATIENT
Start: 2023-06-30 | End: 2023-07-02 | Stop reason: HOSPADM

## 2023-06-30 RX ORDER — FUROSEMIDE 40 MG/1
40 TABLET ORAL 2 TIMES DAILY
Status: DISCONTINUED | OUTPATIENT
Start: 2023-06-30 | End: 2023-07-02 | Stop reason: HOSPADM

## 2023-06-30 RX ORDER — ENOXAPARIN SODIUM 100 MG/ML
40 INJECTION SUBCUTANEOUS DAILY
Status: DISCONTINUED | OUTPATIENT
Start: 2023-06-30 | End: 2023-07-02 | Stop reason: HOSPADM

## 2023-06-30 RX ADMIN — ONDANSETRON 4 MG: 4 TABLET, ORALLY DISINTEGRATING ORAL at 21:07

## 2023-06-30 RX ADMIN — SODIUM CHLORIDE 1000 ML: 9 INJECTION, SOLUTION INTRAVENOUS at 17:40

## 2023-06-30 RX ADMIN — LORAZEPAM 1 MG: 1 TABLET ORAL at 17:37

## 2023-06-30 RX ADMIN — ENOXAPARIN SODIUM 40 MG: 100 INJECTION SUBCUTANEOUS at 18:49

## 2023-06-30 RX ADMIN — FERROUS SULFATE TAB 325 MG (65 MG ELEMENTAL FE) 325 MG: 325 (65 FE) TAB at 21:04

## 2023-06-30 RX ADMIN — MAGNESIUM SULFATE HEPTAHYDRATE 2000 MG: 40 INJECTION, SOLUTION INTRAVENOUS at 17:40

## 2023-06-30 RX ADMIN — PANTOPRAZOLE SODIUM 40 MG: 40 TABLET, DELAYED RELEASE ORAL at 21:04

## 2023-06-30 RX ADMIN — LACTULOSE 20 G: 20 SOLUTION ORAL at 21:04

## 2023-06-30 ASSESSMENT — PAIN SCALES - GENERAL
PAINLEVEL_OUTOF10: 4
PAINLEVEL_OUTOF10: 8

## 2023-06-30 ASSESSMENT — ENCOUNTER SYMPTOMS
NAUSEA: 0
ABDOMINAL PAIN: 1
COUGH: 0

## 2023-06-30 ASSESSMENT — PAIN DESCRIPTION - LOCATION
LOCATION: ABDOMEN
LOCATION: ABDOMEN

## 2023-06-30 ASSESSMENT — PAIN DESCRIPTION - DESCRIPTORS: DESCRIPTORS: ACHING

## 2023-07-01 PROBLEM — E44.1 MILD MALNUTRITION (HCC): Status: ACTIVE | Noted: 2023-04-13

## 2023-07-01 PROBLEM — E44.1 MILD MALNUTRITION (HCC): Status: ACTIVE | Noted: 2023-07-01

## 2023-07-01 LAB
ANION GAP SERPL CALCULATED.3IONS-SCNC: 12 MMOL/L (ref 9–17)
BUN SERPL-MCNC: 21 MG/DL (ref 8–23)
CALCIUM SERPL-MCNC: 8.8 MG/DL (ref 8.6–10.4)
CHLORIDE SERPL-SCNC: 95 MMOL/L (ref 98–107)
CO2 SERPL-SCNC: 24 MMOL/L (ref 20–31)
CREAT SERPL-MCNC: 1.2 MG/DL (ref 0.7–1.2)
ERYTHROCYTE [DISTWIDTH] IN BLOOD BY AUTOMATED COUNT: 18.7 % (ref 11.5–14.9)
GFR SERPL CREATININE-BSD FRML MDRD: >60 ML/MIN/1.73M2
GLUCOSE SERPL-MCNC: 90 MG/DL (ref 70–99)
HCT VFR BLD AUTO: 31.5 % (ref 41–53)
HGB BLD-MCNC: 11 G/DL (ref 13.5–17.5)
INR PPP: 1.3
MAGNESIUM SERPL-MCNC: 1.7 MG/DL (ref 1.6–2.6)
MCH RBC QN AUTO: 29.9 PG (ref 26–34)
MCHC RBC AUTO-ENTMCNC: 34.8 G/DL (ref 31–37)
MCV RBC AUTO: 85.9 FL (ref 80–100)
PLATELET # BLD AUTO: 85 K/UL (ref 150–450)
PMV BLD AUTO: 7.2 FL (ref 6–12)
POTASSIUM SERPL-SCNC: 3.6 MMOL/L (ref 3.7–5.3)
PROTHROMBIN TIME: 16.2 SEC (ref 11.8–14.6)
RBC # BLD AUTO: 3.67 M/UL (ref 4.5–5.9)
SODIUM SERPL-SCNC: 131 MMOL/L (ref 135–144)
WBC OTHER # BLD: 6.1 K/UL (ref 3.5–11)

## 2023-07-01 PROCEDURE — 85610 PROTHROMBIN TIME: CPT

## 2023-07-01 PROCEDURE — 99223 1ST HOSP IP/OBS HIGH 75: CPT | Performed by: INTERNAL MEDICINE

## 2023-07-01 PROCEDURE — 6360000002 HC RX W HCPCS: Performed by: INTERNAL MEDICINE

## 2023-07-01 PROCEDURE — 80048 BASIC METABOLIC PNL TOTAL CA: CPT

## 2023-07-01 PROCEDURE — 6370000000 HC RX 637 (ALT 250 FOR IP): Performed by: NURSE PRACTITIONER

## 2023-07-01 PROCEDURE — 6370000000 HC RX 637 (ALT 250 FOR IP): Performed by: INTERNAL MEDICINE

## 2023-07-01 PROCEDURE — 83735 ASSAY OF MAGNESIUM: CPT

## 2023-07-01 PROCEDURE — 85027 COMPLETE CBC AUTOMATED: CPT

## 2023-07-01 PROCEDURE — 2580000003 HC RX 258: Performed by: INTERNAL MEDICINE

## 2023-07-01 PROCEDURE — 96372 THER/PROPH/DIAG INJ SC/IM: CPT

## 2023-07-01 PROCEDURE — 1200000000 HC SEMI PRIVATE

## 2023-07-01 PROCEDURE — 36415 COLL VENOUS BLD VENIPUNCTURE: CPT

## 2023-07-01 PROCEDURE — G0378 HOSPITAL OBSERVATION PER HR: HCPCS

## 2023-07-01 PROCEDURE — 97162 PT EVAL MOD COMPLEX 30 MIN: CPT

## 2023-07-01 PROCEDURE — 97116 GAIT TRAINING THERAPY: CPT

## 2023-07-01 RX ORDER — SODIUM CHLORIDE 9 MG/ML
INJECTION, SOLUTION INTRAVENOUS CONTINUOUS
Status: DISCONTINUED | OUTPATIENT
Start: 2023-07-01 | End: 2023-07-02 | Stop reason: HOSPADM

## 2023-07-01 RX ORDER — LANOLIN ALCOHOL/MO/W.PET/CERES
6 CREAM (GRAM) TOPICAL NIGHTLY PRN
Status: DISCONTINUED | OUTPATIENT
Start: 2023-07-01 | End: 2023-07-02 | Stop reason: HOSPADM

## 2023-07-01 RX ADMIN — ACETAMINOPHEN 650 MG: 325 TABLET ORAL at 19:37

## 2023-07-01 RX ADMIN — Medication 6 MG: at 01:34

## 2023-07-01 RX ADMIN — FERROUS SULFATE TAB 325 MG (65 MG ELEMENTAL FE) 325 MG: 325 (65 FE) TAB at 21:22

## 2023-07-01 RX ADMIN — SODIUM CHLORIDE: 9 INJECTION, SOLUTION INTRAVENOUS at 10:32

## 2023-07-01 RX ADMIN — ONDANSETRON 4 MG: 2 INJECTION INTRAMUSCULAR; INTRAVENOUS at 17:40

## 2023-07-01 RX ADMIN — FERROUS SULFATE TAB 325 MG (65 MG ELEMENTAL FE) 325 MG: 325 (65 FE) TAB at 09:01

## 2023-07-01 RX ADMIN — ENOXAPARIN SODIUM 40 MG: 100 INJECTION SUBCUTANEOUS at 09:01

## 2023-07-01 RX ADMIN — LACTULOSE 20 G: 20 SOLUTION ORAL at 13:22

## 2023-07-01 RX ADMIN — SODIUM CHLORIDE, PRESERVATIVE FREE 10 ML: 5 INJECTION INTRAVENOUS at 09:16

## 2023-07-01 RX ADMIN — ONDANSETRON 4 MG: 2 INJECTION INTRAMUSCULAR; INTRAVENOUS at 10:34

## 2023-07-01 RX ADMIN — PANTOPRAZOLE SODIUM 40 MG: 40 TABLET, DELAYED RELEASE ORAL at 09:01

## 2023-07-01 RX ADMIN — POTASSIUM CHLORIDE 40 MEQ: 1500 TABLET, EXTENDED RELEASE ORAL at 09:01

## 2023-07-01 RX ADMIN — LACTULOSE 20 G: 20 SOLUTION ORAL at 09:01

## 2023-07-01 RX ADMIN — ACETAMINOPHEN 650 MG: 325 TABLET ORAL at 06:32

## 2023-07-01 RX ADMIN — LACTULOSE 20 G: 20 SOLUTION ORAL at 21:22

## 2023-07-01 ASSESSMENT — PAIN DESCRIPTION - DESCRIPTORS
DESCRIPTORS: ACHING
DESCRIPTORS: ACHING

## 2023-07-01 ASSESSMENT — PAIN SCALES - GENERAL
PAINLEVEL_OUTOF10: 6
PAINLEVEL_OUTOF10: 3
PAINLEVEL_OUTOF10: 6

## 2023-07-01 ASSESSMENT — PAIN DESCRIPTION - LOCATION
LOCATION: ABDOMEN
LOCATION: ABDOMEN

## 2023-07-02 VITALS
TEMPERATURE: 98.3 F | SYSTOLIC BLOOD PRESSURE: 125 MMHG | WEIGHT: 180 LBS | DIASTOLIC BLOOD PRESSURE: 53 MMHG | RESPIRATION RATE: 16 BRPM | OXYGEN SATURATION: 95 % | HEART RATE: 74 BPM | BODY MASS INDEX: 25.77 KG/M2 | HEIGHT: 70 IN

## 2023-07-02 LAB
ANION GAP SERPL CALCULATED.3IONS-SCNC: 13 MMOL/L (ref 9–17)
BUN SERPL-MCNC: 22 MG/DL (ref 8–23)
CALCIUM SERPL-MCNC: 9.3 MG/DL (ref 8.6–10.4)
CHLORIDE SERPL-SCNC: 99 MMOL/L (ref 98–107)
CO2 SERPL-SCNC: 22 MMOL/L (ref 20–31)
CREAT SERPL-MCNC: 1.2 MG/DL (ref 0.7–1.2)
ERYTHROCYTE [DISTWIDTH] IN BLOOD BY AUTOMATED COUNT: 18.4 % (ref 11.5–14.9)
GFR SERPL CREATININE-BSD FRML MDRD: >60 ML/MIN/1.73M2
GLUCOSE SERPL-MCNC: 95 MG/DL (ref 70–99)
HCT VFR BLD AUTO: 31.3 % (ref 41–53)
HGB BLD-MCNC: 10.8 G/DL (ref 13.5–17.5)
MCH RBC QN AUTO: 30.1 PG (ref 26–34)
MCHC RBC AUTO-ENTMCNC: 34.4 G/DL (ref 31–37)
MCV RBC AUTO: 87.5 FL (ref 80–100)
PLATELET # BLD AUTO: 80 K/UL (ref 150–450)
PMV BLD AUTO: 7.3 FL (ref 6–12)
POTASSIUM SERPL-SCNC: 3.9 MMOL/L (ref 3.7–5.3)
RBC # BLD AUTO: 3.58 M/UL (ref 4.5–5.9)
SODIUM SERPL-SCNC: 134 MMOL/L (ref 135–144)
WBC OTHER # BLD: 7 K/UL (ref 3.5–11)

## 2023-07-02 PROCEDURE — 2580000003 HC RX 258: Performed by: INTERNAL MEDICINE

## 2023-07-02 PROCEDURE — 85027 COMPLETE CBC AUTOMATED: CPT

## 2023-07-02 PROCEDURE — 36415 COLL VENOUS BLD VENIPUNCTURE: CPT

## 2023-07-02 PROCEDURE — 6360000002 HC RX W HCPCS: Performed by: INTERNAL MEDICINE

## 2023-07-02 PROCEDURE — 99239 HOSP IP/OBS DSCHRG MGMT >30: CPT | Performed by: INTERNAL MEDICINE

## 2023-07-02 PROCEDURE — 6370000000 HC RX 637 (ALT 250 FOR IP): Performed by: NURSE PRACTITIONER

## 2023-07-02 PROCEDURE — 6370000000 HC RX 637 (ALT 250 FOR IP): Performed by: INTERNAL MEDICINE

## 2023-07-02 PROCEDURE — 80048 BASIC METABOLIC PNL TOTAL CA: CPT

## 2023-07-02 RX ORDER — M-VIT,TX,IRON,MINS/CALC/FOLIC 27MG-0.4MG
1 TABLET ORAL DAILY
Qty: 30 TABLET | Refills: 2 | Status: SHIPPED | OUTPATIENT
Start: 2023-07-02

## 2023-07-02 RX ADMIN — FERROUS SULFATE TAB 325 MG (65 MG ELEMENTAL FE) 325 MG: 325 (65 FE) TAB at 07:39

## 2023-07-02 RX ADMIN — SODIUM CHLORIDE: 9 INJECTION, SOLUTION INTRAVENOUS at 01:09

## 2023-07-02 RX ADMIN — PANTOPRAZOLE SODIUM 40 MG: 40 TABLET, DELAYED RELEASE ORAL at 07:39

## 2023-07-02 RX ADMIN — SODIUM CHLORIDE, PRESERVATIVE FREE 10 ML: 5 INJECTION INTRAVENOUS at 07:43

## 2023-07-02 RX ADMIN — ENOXAPARIN SODIUM 40 MG: 100 INJECTION SUBCUTANEOUS at 07:39

## 2023-07-02 RX ADMIN — ACETAMINOPHEN 650 MG: 325 TABLET ORAL at 07:39

## 2023-07-02 RX ADMIN — ONDANSETRON 4 MG: 4 TABLET, ORALLY DISINTEGRATING ORAL at 01:04

## 2023-07-02 RX ADMIN — ONDANSETRON 4 MG: 2 INJECTION INTRAMUSCULAR; INTRAVENOUS at 07:40

## 2023-07-02 RX ADMIN — LACTULOSE 20 G: 20 SOLUTION ORAL at 07:39

## 2023-07-02 ASSESSMENT — PAIN DESCRIPTION - LOCATION: LOCATION: HEAD

## 2023-07-02 ASSESSMENT — PAIN SCALES - GENERAL: PAINLEVEL_OUTOF10: 5

## 2023-07-03 ENCOUNTER — CARE COORDINATION (OUTPATIENT)
Dept: CASE MANAGEMENT | Age: 63
End: 2023-07-03

## 2023-07-03 LAB
EKG ATRIAL RATE: 84 BPM
EKG P AXIS: 65 DEGREES
EKG P-R INTERVAL: 146 MS
EKG Q-T INTERVAL: 398 MS
EKG QRS DURATION: 78 MS
EKG QTC CALCULATION (BAZETT): 470 MS
EKG R AXIS: 53 DEGREES
EKG T AXIS: 25 DEGREES
EKG VENTRICULAR RATE: 84 BPM

## 2023-07-03 PROCEDURE — 93010 ELECTROCARDIOGRAM REPORT: CPT | Performed by: INTERNAL MEDICINE

## 2023-07-03 NOTE — CARE COORDINATION
Care Transitions Outreach Attempt    Call within 2 business days of discharge: Yes   Attempted to reach patient for transitions of care follow up. Unable to reach patient. Patient: Delilah Tolbert Patient : 1960 MRN: [id number]    Last Discharge [address] [facility] [facility]       Date Complaint Diagnosis Description Type Department Provider    23 Abnormal Lab Hypomagnesemia . .. ED to Hosp-Admission (Discharged) (ADMITTED) Adia Graves MD; Emily Sanabria. .. # 1 attempt-Attempted initial 24 hour hospital follow up call. Left a Hipaa compliant message with name and call back information. Requested return call to [phone]. Was this an external facility discharge?  No Discharge Facility: [facility]    Noted following upcoming appointments from discharge chart review:   [facility] follow up appointment(s):   Future Appointments   Date Time Provider [address]   2023  2:30 PM Daniel Browne MD [facility] GI [facility]   2023  1:30 PM VASU Barry [facility] PC [facility]   2023 11:45 AM Santiago Godoy MD [facility] CANCER [facility]   2023  4:30 PM [facility] EMG  [facility] EM[facility]   2023  4:30 PM MD Brooke Venegas med/reha [facility]     Non-[facility] follow up appointment(s):

## 2023-07-05 ENCOUNTER — CARE COORDINATION (OUTPATIENT)
Dept: CASE MANAGEMENT | Age: 63
End: 2023-07-05

## 2023-07-05 ENCOUNTER — HOSPITAL ENCOUNTER (OUTPATIENT)
Age: 63
Discharge: HOME | End: 2023-07-05
Payer: COMMERCIAL

## 2023-07-05 DIAGNOSIS — K70.31 ASCITES DUE TO ALCOHOLIC CIRRHOSIS (HCC): ICD-10-CM

## 2023-07-05 LAB
ALBUMIN SERPL-MCNC: 3.4 G/DL (ref 3.5–5.2)
ALP SERPL-CCNC: 123 U/L (ref 40–129)
ALT SERPL-CCNC: 17 U/L (ref 5–41)
ANION GAP SERPL CALCULATED.3IONS-SCNC: 13 MMOL/L (ref 9–17)
AST SERPL-CCNC: 57 U/L
BASOPHILS # BLD: 0 K/UL (ref 0–0.2)
BASOPHILS NFR BLD: 0 % (ref 0–2)
BILIRUB SERPL-MCNC: 0.9 MG/DL (ref 0.3–1.2)
BUN SERPL-MCNC: 15 MG/DL (ref 8–23)
CALCIUM SERPL-MCNC: 9 MG/DL (ref 8.6–10.4)
CEA SERPL-MCNC: 10.1 NG/ML
CHLORIDE SERPL-SCNC: 103 MMOL/L (ref 98–107)
CO2 SERPL-SCNC: 21 MMOL/L (ref 20–31)
CREAT SERPL-MCNC: 1.11 MG/DL (ref 0.7–1.2)
EOSINOPHIL # BLD: 0.25 K/UL (ref 0–0.4)
EOSINOPHILS RELATIVE PERCENT: 5 % (ref 0–4)
ERYTHROCYTE [DISTWIDTH] IN BLOOD BY AUTOMATED COUNT: 18.3 % (ref 11.5–14.9)
GFR SERPL CREATININE-BSD FRML MDRD: >60 ML/MIN/1.73M2
GLUCOSE SERPL-MCNC: 96 MG/DL (ref 70–99)
HCT VFR BLD AUTO: 33.2 % (ref 41–53)
HGB BLD-MCNC: 11 G/DL (ref 13.5–17.5)
LYMPHOCYTES # BLD: 9 % (ref 24–44)
LYMPHOCYTES NFR BLD: 0.45 K/UL (ref 1–4.8)
MCH RBC QN AUTO: 29.8 PG (ref 26–34)
MCHC RBC AUTO-ENTMCNC: 33.2 G/DL (ref 31–37)
MCV RBC AUTO: 89.6 FL (ref 80–100)
MONOCYTES NFR BLD: 1.3 K/UL (ref 0.1–1.3)
MONOCYTES NFR BLD: 26 % (ref 1–7)
MORPHOLOGY: NORMAL
NEUTROPHILS NFR BLD: 60 % (ref 36–66)
NEUTS SEG NFR BLD: 3 K/UL (ref 1.3–9.1)
PLATELET # BLD AUTO: 106 K/UL (ref 150–450)
PMV BLD AUTO: 6.7 FL (ref 6–12)
POTASSIUM SERPL-SCNC: 4.2 MMOL/L (ref 3.7–5.3)
PROT SERPL-MCNC: 6.9 G/DL (ref 6.4–8.3)
RBC # BLD AUTO: 3.7 M/UL (ref 4.5–5.9)
SODIUM SERPL-SCNC: 137 MMOL/L (ref 135–144)
WBC OTHER # BLD: 5 K/UL (ref 3.5–11)

## 2023-07-05 PROCEDURE — 80053 COMPREHEN METABOLIC PANEL: CPT

## 2023-07-05 PROCEDURE — 85027 COMPLETE CBC AUTOMATED: CPT

## 2023-07-05 PROCEDURE — 36415 COLL VENOUS BLD VENIPUNCTURE: CPT

## 2023-07-05 PROCEDURE — 82378 CARCINOEMBRYONIC ANTIGEN: CPT

## 2023-07-05 RX ORDER — PANTOPRAZOLE SODIUM 40 MG/1
TABLET, DELAYED RELEASE ORAL
Qty: 30 TABLET | Refills: 0 | Status: SHIPPED | OUTPATIENT
Start: 2023-07-05

## 2023-07-05 NOTE — CARE COORDINATION
Care Transitions Outreach Attempt #2    Call within 2 business days of discharge: Yes   Attempt #2 to reach patient for transitions of care follow up. Unable to reach patient. Left  requesting call back. Patient: John Alexandra Patient : 1960 MRN: [id number]    Last Discharge [address] [facility],[address] [facility]       Date Complaint Diagnosis Description Type Department Provider    23 Abnormal Lab Hypomagnesemia . .. ED to Hosp-Admission (Discharged) (ADMITTED) Wilmar Bruno MD; Jennifer [facility]. Farzana Ribeiro Was this an external facility discharge? No Discharge Facility: [facility]    Noted following upcoming appointments from discharge chart review:   [facility] follow up appointment(s):   Future Appointments   Date Time Provider [address]   2023  2:30 PM [facility]   2023  1:30 PM [facility]   2023 11:45 AM Lynnette Cid MD [address] [place] [address]   2023  4:30 PM [facility] EMG  [facility] EMG [facility]   2023  4:30 PM MD Jennifer Khan RN BSN [facility]  Care Transitions Nurse  [phone]   [email or web address]

## 2023-07-05 NOTE — TELEPHONE ENCOUNTER
LAST VISIT:   5/19/2023     Future Appointments   Date Time Provider [address]   7/7/2023  2:30 PM Dewayne Zambrano MD [facility] GI [facility]   8/21/2023  1:30 PM VASU Liao PC [facility]   8/29/2023 11:45 AM Naya Holt MD [facility]   9/11/2023  4:30 PM [facility] EMG  [facility] EM[facility]   9/11/2023  4:30 PM Fay Fuentes MD [address] [place] [address]

## 2023-07-07 ENCOUNTER — OFFICE VISIT (OUTPATIENT)
Dept: GASTROENTEROLOGY | Age: 63
End: 2023-07-07
Payer: COMMERCIAL

## 2023-07-07 VITALS
WEIGHT: 180.2 LBS | TEMPERATURE: 97.9 F | HEART RATE: 88 BPM | HEIGHT: 70 IN | DIASTOLIC BLOOD PRESSURE: 80 MMHG | BODY MASS INDEX: 25.8 KG/M2 | SYSTOLIC BLOOD PRESSURE: 142 MMHG

## 2023-07-07 DIAGNOSIS — C15.9 ESOPHAGEAL CANCER, STAGE IIA (HCC): ICD-10-CM

## 2023-07-07 DIAGNOSIS — K70.31 ASCITES DUE TO ALCOHOLIC CIRRHOSIS (HCC): Primary | ICD-10-CM

## 2023-07-07 DIAGNOSIS — K76.6 PORTAL HYPERTENSION (HCC): ICD-10-CM

## 2023-07-07 DIAGNOSIS — R97.0 CARCINOEMBRYONIC ANTIGEN (CEA) ELEVATION: ICD-10-CM

## 2023-07-07 PROCEDURE — 3077F SYST BP >= 140 MM HG: CPT | Performed by: INTERNAL MEDICINE

## 2023-07-07 PROCEDURE — 3079F DIAST BP 80-89 MM HG: CPT | Performed by: INTERNAL MEDICINE

## 2023-07-07 PROCEDURE — 99213 OFFICE O/P EST LOW 20 MIN: CPT | Performed by: INTERNAL MEDICINE

## 2023-07-07 ASSESSMENT — ENCOUNTER SYMPTOMS
CONSTIPATION: 0
VOMITING: 0
NAUSEA: 1
COUGH: 0
RECTAL PAIN: 0
VOICE CHANGE: 0
WHEEZING: 0
SORE THROAT: 0
SINUS PRESSURE: 0
ABDOMINAL DISTENTION: 0
TROUBLE SWALLOWING: 0
ABDOMINAL PAIN: 1
BACK PAIN: 0
ANAL BLEEDING: 0
CHOKING: 0
BLOOD IN STOOL: 0
DIARRHEA: 0

## 2023-07-07 NOTE — PROGRESS NOTES
UPPER GASTROINTESTINAL ENDOSCOPY N/A 10/31/2022    EGD with APC performed by Jose A Alexandra MD at [address]  12/29/2022    EGD ESOPHAGOGASTRODUODENOSCOPY performed by Alka Winchester MD at [address] 01/03/2023    EGD ESOPHAGOGASTRODUODENOSCOPY performed by Vincenzo Lai MD at [address] N/A 01/26/2023    EGD CONTROL HEMORRHAGE performed by Jose A Alexandra MD at [address] [facility] N/A 2/13/2023    EGD WITH APC GOLD PROBE performed by Jose A Alexandra MD at [address] [facility] N/A 3/17/2023    EGD WITH RESOLUTION CLIP APPLICATION X3 performed by Rupert Coy MD at [address] [place] [address] & [facility]:    Current Outpatient Medications:     pantoprazole (PROTONIX) 40 MG tablet, take 1 tablet by mouth once daily, Disp: 30 tablet, Rfl: 0    Multiple Vitamins-Minerals (THERAPEUTIC MULTIVITAMIN-MINERALS) tablet, Take 1 tablet by mouth daily, Disp: 30 tablet, Rfl: 2    Magnesium Oxide (MAGNESIUM-OXIDE) 250 MG TABS tablet, Take 1 tablet by mouth daily, Disp: 30 tablet, Rfl: 0    acetaminophen (TYLENOL) 325 MG tablet, Take 2 tablets by mouth every 6 hours as needed for Pain, Disp: , Rfl:     furosemide (LASIX) 40 MG tablet, Take 1 tablet by mouth 2 times daily, Disp: , Rfl:     ondansetron (ZOFRAN) 4 MG tablet, Take 1 tablet by mouth 3 times daily as needed for Nausea or Vomiting, Disp: 30 tablet, Rfl: 0    spironolactone (ALDACTONE) 25 MG tablet, Take 2 tablets by mouth daily, Disp: 30 tablet, Rfl: 0    lactulose (CHRONULAC) 10 GM/15ML solution, take 30 milliliters by mouth three times a day, Disp: 2700 mL, Rfl: 3    FEROSUL 325 (65 Fe) MG tablet, take 1 tablet by mouth twice a day, Disp: 60 tablet, Rfl: 5    ALLERGIES:   No Known Allergies    FAMILY HISTORY:       Problem Relation Age of Onset    Cancer Mother

## 2023-07-18 ENCOUNTER — HOSPITAL ENCOUNTER (OUTPATIENT)
Age: 63
Discharge: HOME | End: 2023-07-18
Payer: COMMERCIAL

## 2023-07-18 DIAGNOSIS — K70.31 ASCITES DUE TO ALCOHOLIC CIRRHOSIS (HCC): ICD-10-CM

## 2023-07-18 LAB
ALBUMIN SERPL-MCNC: 3.2 G/DL (ref 3.5–5.2)
ALP SERPL-CCNC: 155 U/L (ref 40–129)
ALT SERPL-CCNC: 25 U/L (ref 5–41)
ANION GAP SERPL CALCULATED.3IONS-SCNC: 12 MMOL/L (ref 9–17)
AST SERPL-CCNC: 100 U/L
BASOPHILS # BLD: 0 K/UL (ref 0–0.2)
BASOPHILS NFR BLD: 0 % (ref 0–2)
BILIRUB SERPL-MCNC: 1.3 MG/DL (ref 0.3–1.2)
BUN SERPL-MCNC: 11 MG/DL (ref 8–23)
CALCIUM SERPL-MCNC: 8.7 MG/DL (ref 8.6–10.4)
CHLORIDE SERPL-SCNC: 105 MMOL/L (ref 98–107)
CO2 SERPL-SCNC: 22 MMOL/L (ref 20–31)
CREAT SERPL-MCNC: 1 MG/DL (ref 0.7–1.2)
EOSINOPHIL # BLD: 0.14 K/UL (ref 0–0.4)
EOSINOPHILS RELATIVE PERCENT: 3 % (ref 0–4)
ERYTHROCYTE [DISTWIDTH] IN BLOOD BY AUTOMATED COUNT: 16.6 % (ref 11.5–14.9)
GFR SERPL CREATININE-BSD FRML MDRD: >60 ML/MIN/1.73M2
GLUCOSE SERPL-MCNC: 95 MG/DL (ref 70–99)
HCT VFR BLD AUTO: 34.2 % (ref 41–53)
HGB BLD-MCNC: 11.3 G/DL (ref 13.5–17.5)
LYMPHOCYTES # BLD: 20 % (ref 24–44)
LYMPHOCYTES NFR BLD: 0.92 K/UL (ref 1–4.8)
MCH RBC QN AUTO: 29.9 PG (ref 26–34)
MCHC RBC AUTO-ENTMCNC: 33 G/DL (ref 31–37)
MCV RBC AUTO: 90.9 FL (ref 80–100)
MONOCYTES NFR BLD: 0.69 K/UL (ref 0.1–1.3)
MONOCYTES NFR BLD: 15 % (ref 1–7)
MORPHOLOGY: ABNORMAL
NEUTROPHILS NFR BLD: 62 % (ref 36–66)
NEUTS SEG NFR BLD: 2.85 K/UL (ref 1.3–9.1)
PLATELET # BLD AUTO: 76 K/UL (ref 150–450)
PMV BLD AUTO: 7.1 FL (ref 6–12)
POTASSIUM SERPL-SCNC: 4.3 MMOL/L (ref 3.7–5.3)
PROT SERPL-MCNC: 6.9 G/DL (ref 6.4–8.3)
RBC # BLD AUTO: 3.76 M/UL (ref 4.5–5.9)
SODIUM SERPL-SCNC: 139 MMOL/L (ref 135–144)
WBC OTHER # BLD: 4.6 K/UL (ref 3.5–11)

## 2023-07-18 PROCEDURE — 85027 COMPLETE CBC AUTOMATED: CPT

## 2023-07-18 PROCEDURE — 80053 COMPREHEN METABOLIC PANEL: CPT

## 2023-07-18 PROCEDURE — 36415 COLL VENOUS BLD VENIPUNCTURE: CPT

## 2023-07-19 ENCOUNTER — OFFICE VISIT (OUTPATIENT)
Dept: GASTROENTEROLOGY | Age: 63
End: 2023-07-19

## 2023-07-19 DIAGNOSIS — K70.31 ASCITES DUE TO ALCOHOLIC CIRRHOSIS (HCC): Primary | ICD-10-CM

## 2023-07-21 ENCOUNTER — OFFICE VISIT (OUTPATIENT)
Dept: GASTROENTEROLOGY | Age: 63
End: 2023-07-21
Payer: COMMERCIAL

## 2023-07-21 ENCOUNTER — CARE COORDINATION (OUTPATIENT)
Dept: CASE MANAGEMENT | Age: 63
End: 2023-07-21

## 2023-07-21 VITALS
SYSTOLIC BLOOD PRESSURE: 158 MMHG | DIASTOLIC BLOOD PRESSURE: 83 MMHG | BODY MASS INDEX: 25.22 KG/M2 | WEIGHT: 176.2 LBS | HEIGHT: 70 IN | HEART RATE: 67 BPM

## 2023-07-21 DIAGNOSIS — K74.69 DECOMPENSATED LIVER DISEASE (HCC): ICD-10-CM

## 2023-07-21 DIAGNOSIS — C15.9 ESOPHAGEAL CANCER, STAGE IIA (HCC): ICD-10-CM

## 2023-07-21 DIAGNOSIS — K76.6 PORTAL HYPERTENSION (HCC): Primary | ICD-10-CM

## 2023-07-21 DIAGNOSIS — R97.0 CARCINOEMBRYONIC ANTIGEN (CEA) ELEVATION: ICD-10-CM

## 2023-07-21 DIAGNOSIS — K70.31 ALCOHOLIC CIRRHOSIS OF LIVER WITH ASCITES (HCC): ICD-10-CM

## 2023-07-21 PROCEDURE — APPSS45 APP SPLIT SHARED TIME 31-45 MINUTES: Performed by: NURSE PRACTITIONER

## 2023-07-21 PROCEDURE — 99214 OFFICE O/P EST MOD 30 MIN: CPT | Performed by: INTERNAL MEDICINE

## 2023-07-21 PROCEDURE — 3077F SYST BP >= 140 MM HG: CPT | Performed by: INTERNAL MEDICINE

## 2023-07-21 PROCEDURE — 3079F DIAST BP 80-89 MM HG: CPT | Performed by: INTERNAL MEDICINE

## 2023-07-21 RX ORDER — SPIRONOLACTONE 25 MG/1
25 TABLET ORAL DAILY
Qty: 30 TABLET | Refills: 0 | Status: SHIPPED | OUTPATIENT
Start: 2023-07-21

## 2023-07-21 ASSESSMENT — ENCOUNTER SYMPTOMS
DIARRHEA: 0
COUGH: 0
VOICE CHANGE: 0
ABDOMINAL PAIN: 1
ABDOMINAL DISTENTION: 0
ANAL BLEEDING: 0
WHEEZING: 0
SINUS PRESSURE: 0
NAUSEA: 1
TROUBLE SWALLOWING: 0
CONSTIPATION: 0
CHOKING: 0
RECTAL PAIN: 0
BACK PAIN: 0
BLOOD IN STOOL: 0
SORE THROAT: 0
VOMITING: 0

## 2023-07-21 NOTE — CARE COORDINATION
[facility] Transitions Follow Up Call    Patient Current Location:  Home: [address] [place] [address] Dr Mary Anne Bae [id number]    Care Transition Nurse contacted the patient by telephone to follow up after admission on 23. Verified name and  with patient as identifiers. Patient: Rahat Landin  Patient : 1960   MRN: [id number]  Reason for Admission:   Discharge Date: 23 RARS: Readmission Risk Score: 32.5      Needs to be reviewed by the provider   Additional needs identified to be addressed with provider: Yes  DME-Patient needs orders for ostomy supplies sent to [facility]  fax #  [phone]             Method of communication with provider: chart routing. Writer received call from patient, he never answered when Samaria Gautam  originally tried to reach patient but had writer's number so he called stating he needs ostomy supplies and that he is having issues with his ostomy, writer contacted patient, provided # for Wound Care at [facility], suggested he get an appointment to get ostomy checked if he is having changes and issues, writer also contacted wound care left message to reach out to patient, also contacted [facility] spoke to [place], requested ostomy supplies be sent to patient, he stated he would contact the patient to see what he needed but did tell writer that some of the things patient was requesting will need orders from pcp, writer sent request to pcp office, will follow//JU    Addressed changes since last contact:  DME-   Discussed follow-up appointments. If no appointment was previously scheduled, appointment scheduling offered: Yes. Is follow up appointment scheduled within 7 days of discharge?  .    Follow Up  Future Appointments   Date Time Provider [address]   2023  2:00 PM Mich Garcia MD [facility]   2023  1:30 PM VASU Lyles [facility]   2023 11:45 AM Lali Medina MD [facility]   2023  4:30 PM [facility]  [facility]

## 2023-07-21 NOTE — PROGRESS NOTES
GI OFFICE FOLLOW UP    INTERVAL HISTORY:   No referring provider defined for this encounter. Chief Complaint   Patient presents with    Other     Patient is here to be seen for a follow up. HISTORY OF PRESENT ILLNESS:     Patient being seen to discuss medications. Clinically doing okay. Question whether he has been taking his medications as prescribed. Patient was advised to see stoma nurse unfortunately he has not been able to be seen. No significant ascites. Medications reviewed. Past Medical,Family, and Social History reviewed and does contribute to the patient presenting condition. Patient's PMH/PSH,SH,PSYCH Hx, MEDs, ALLERGIES, and ROS were all reviewed and updated in the appropriate sections.  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 Ricki Rachel, orthopedic, every 3-4 months, gets steroid injection    Lezama esophagus     Bleeding gastric varices 12/29/2022    BPH (benign prostatic hypertrophy)     Cholelithiasis     Cirrhosis ([address])     COVID-19 12/2020    pt reports he had a positive test while at [facility] in 2020, was asymptomatic    COVID-19 vaccine series completed 5/20/2021, 6/22/2021    Moderna 5/20/2021, 6/22/2021    DDD (degenerative disc disease), lumbar     Depression     Esophageal cancer ([address])     INVASIVE ADENOCARCINOMA ARISING IN TUBULAR ADENOMA WITH HIGH GRADE DYSPLASIA, ASSOCIATED WITH FOCAL INTESTINAL METAPLASIA     Esophageal varices ([address])     Fatty liver     GERD

## 2023-07-24 ENCOUNTER — TELEPHONE (OUTPATIENT)
Dept: WOUND CARE | Age: 63
End: 2023-07-24

## 2023-07-24 NOTE — TELEPHONE ENCOUNTER
Called patient to discuss referral received from GI clinic for ostomy eval and treat. Pt states that he has some itching and irritation around the stoma. And states that he has been using different pouches that he does not like as much and it is sometimes difficult to empty due to having a spout tip. Also having watery output which makes him scared to eat anything. Also states that the stoma is getting larger for unknown.     Jesus KEYS NP-C, CWS, [address]  Wound, Ostomy, and Continence Nursing  [phone]

## 2023-07-28 ENCOUNTER — TELEPHONE (OUTPATIENT)
Dept: PRIMARY CARE CLINIC | Age: 63
End: 2023-07-28

## 2023-07-28 RX ORDER — PNV NO.95/FERROUS FUM/FOLIC AC 28MG-0.8MG
TABLET ORAL
Qty: 30 TABLET | Refills: 5 | Status: SHIPPED | OUTPATIENT
Start: 2023-07-28

## 2023-07-28 NOTE — TELEPHONE ENCOUNTER
L.M. for pt to call the office. Need to know what Ostomy supplies pt is needing. Does he need, skin barrier wipes, etc. Can fax order  to [facility] @ [phone], per [facility]. Spoke with Chinmay @ [facility] and if he needs anything else from us, he will let us know.

## 2023-07-31 ENCOUNTER — APPOINTMENT (OUTPATIENT)
Dept: CT IMAGING | Age: 63
DRG: 602 | End: 2023-07-31
Payer: COMMERCIAL

## 2023-07-31 ENCOUNTER — HOSPITAL ENCOUNTER (INPATIENT)
Age: 63
LOS: 4 days | Discharge: HOME | DRG: 602 | End: 2023-08-04
Attending: EMERGENCY MEDICINE | Admitting: INTERNAL MEDICINE
Payer: COMMERCIAL

## 2023-07-31 ENCOUNTER — HOSPITAL ENCOUNTER (OUTPATIENT)
Dept: WOUND CARE | Age: 63
Discharge: HOME | End: 2023-07-31
Payer: MEDICARE

## 2023-07-31 VITALS — HEART RATE: 116 BPM | DIASTOLIC BLOOD PRESSURE: 84 MMHG | SYSTOLIC BLOOD PRESSURE: 140 MMHG

## 2023-07-31 DIAGNOSIS — R74.02 ELEVATED SERUM LACTATE DEHYDROGENASE: ICD-10-CM

## 2023-07-31 DIAGNOSIS — E86.0 DEHYDRATION: Primary | ICD-10-CM

## 2023-07-31 DIAGNOSIS — R19.8 INCREASED ILEOSTOMY OUTPUT (HCC): ICD-10-CM

## 2023-07-31 DIAGNOSIS — Z93.2 ILEOSTOMY IN PLACE (HCC): ICD-10-CM

## 2023-07-31 DIAGNOSIS — L02.219 CELLULITIS AND ABSCESS OF TRUNK: ICD-10-CM

## 2023-07-31 DIAGNOSIS — L30.8 PERISTOMAL DERMATITIS ASSOCIATED WITH MOISTURE: ICD-10-CM

## 2023-07-31 DIAGNOSIS — L03.319 CELLULITIS AND ABSCESS OF TRUNK: ICD-10-CM

## 2023-07-31 DIAGNOSIS — Z93.2 INCREASED ILEOSTOMY OUTPUT (HCC): ICD-10-CM

## 2023-07-31 PROBLEM — L03.90 CELLULITIS: Status: ACTIVE | Noted: 2023-07-31

## 2023-07-31 LAB
ALBUMIN SERPL-MCNC: 4 G/DL (ref 3.5–5.2)
ALP SERPL-CCNC: 183 U/L (ref 40–129)
ALT SERPL-CCNC: 47 U/L (ref 5–41)
AMMONIA PLAS-SCNC: 35 UMOL/L (ref 16–60)
ANION GAP SERPL CALCULATED.3IONS-SCNC: 18 MMOL/L (ref 9–17)
AST SERPL-CCNC: 154 U/L
BASOPHILS # BLD: 0.07 K/UL (ref 0–0.2)
BASOPHILS NFR BLD: 1 % (ref 0–2)
BILIRUB DIRECT SERPL-MCNC: 1.1 MG/DL
BILIRUB INDIRECT SERPL-MCNC: 1.5 MG/DL (ref 0–1)
BILIRUB SERPL-MCNC: 2.6 MG/DL (ref 0.3–1.2)
BUN SERPL-MCNC: 16 MG/DL (ref 8–23)
CALCIUM SERPL-MCNC: 9 MG/DL (ref 8.6–10.4)
CHLORIDE SERPL-SCNC: 92 MMOL/L (ref 98–107)
CO2 SERPL-SCNC: 20 MMOL/L (ref 20–31)
CREAT SERPL-MCNC: 1.5 MG/DL (ref 0.7–1.2)
EOSINOPHIL # BLD: 0 K/UL (ref 0–0.4)
EOSINOPHILS RELATIVE PERCENT: 0 % (ref 0–4)
ERYTHROCYTE [DISTWIDTH] IN BLOOD BY AUTOMATED COUNT: 16.3 % (ref 11.5–14.9)
GFR SERPL CREATININE-BSD FRML MDRD: 52 ML/MIN/1.73M2
GLUCOSE SERPL-MCNC: 114 MG/DL (ref 70–99)
HCT VFR BLD AUTO: 37.1 % (ref 41–53)
HGB BLD-MCNC: 12.9 G/DL (ref 13.5–17.5)
INR PPP: 1.2
LACTATE BLDV-SCNC: 2.4 MMOL/L (ref 0.5–2.2)
LACTATE BLDV-SCNC: 2.5 MMOL/L (ref 0.5–2.2)
LACTATE BLDV-SCNC: 2.5 MMOL/L (ref 0.5–2.2)
LACTATE BLDV-SCNC: 3.4 MMOL/L (ref 0.5–2.2)
LIPASE SERPL-CCNC: 32 U/L (ref 13–60)
LYMPHOCYTES NFR BLD: 0.21 K/UL (ref 1–4.8)
LYMPHOCYTES RELATIVE PERCENT: 3 % (ref 24–44)
MCH RBC QN AUTO: 31.6 PG (ref 26–34)
MCHC RBC AUTO-ENTMCNC: 34.9 G/DL (ref 31–37)
MCV RBC AUTO: 90.8 FL (ref 80–100)
MONOCYTES NFR BLD: 0.63 K/UL (ref 0.1–1.3)
MONOCYTES NFR BLD: 9 % (ref 1–7)
MORPHOLOGY: ABNORMAL
NEUTROPHILS NFR BLD: 87 % (ref 36–66)
NEUTS SEG NFR BLD: 6.09 K/UL (ref 1.3–9.1)
PARTIAL THROMBOPLASTIN TIME: 31.6 SEC (ref 24–36)
PLATELET # BLD AUTO: 103 K/UL (ref 150–450)
PMV BLD AUTO: 6.9 FL (ref 6–12)
POTASSIUM SERPL-SCNC: 4 MMOL/L (ref 3.7–5.3)
PROT SERPL-MCNC: 8.1 G/DL (ref 6.4–8.3)
PROTHROMBIN TIME: 15.3 SEC (ref 11.8–14.6)
RBC # BLD AUTO: 4.09 M/UL (ref 4.5–5.9)
SODIUM SERPL-SCNC: 130 MMOL/L (ref 135–144)
TROPONIN I SERPL HS-MCNC: 32 NG/L (ref 0–22)
TROPONIN I SERPL HS-MCNC: 36 NG/L (ref 0–22)
WBC OTHER # BLD: 7 K/UL (ref 3.5–11)

## 2023-07-31 PROCEDURE — 83690 ASSAY OF LIPASE: CPT

## 2023-07-31 PROCEDURE — 6360000002 HC RX W HCPCS: Performed by: INTERNAL MEDICINE

## 2023-07-31 PROCEDURE — 80048 BASIC METABOLIC PNL TOTAL CA: CPT

## 2023-07-31 PROCEDURE — 2580000003 HC RX 258: Performed by: INTERNAL MEDICINE

## 2023-07-31 PROCEDURE — 36415 COLL VENOUS BLD VENIPUNCTURE: CPT

## 2023-07-31 PROCEDURE — 87205 SMEAR GRAM STAIN: CPT

## 2023-07-31 PROCEDURE — 6370000000 HC RX 637 (ALT 250 FOR IP): Performed by: NURSE PRACTITIONER

## 2023-07-31 PROCEDURE — 82140 ASSAY OF AMMONIA: CPT

## 2023-07-31 PROCEDURE — 80076 HEPATIC FUNCTION PANEL: CPT

## 2023-07-31 PROCEDURE — 85610 PROTHROMBIN TIME: CPT

## 2023-07-31 PROCEDURE — 87154 CUL TYP ID BLD PTHGN 6+ TRGT: CPT

## 2023-07-31 PROCEDURE — 85730 THROMBOPLASTIN TIME PARTIAL: CPT

## 2023-07-31 PROCEDURE — 96374 THER/PROPH/DIAG INJ IV PUSH: CPT

## 2023-07-31 PROCEDURE — 84484 ASSAY OF TROPONIN QUANT: CPT

## 2023-07-31 PROCEDURE — 93005 ELECTROCARDIOGRAM TRACING: CPT

## 2023-07-31 PROCEDURE — 96361 HYDRATE IV INFUSION ADD-ON: CPT

## 2023-07-31 PROCEDURE — 74176 CT ABD & PELVIS W/O CONTRAST: CPT

## 2023-07-31 PROCEDURE — 99211 OFF/OP EST MAY X REQ PHY/QHP: CPT

## 2023-07-31 PROCEDURE — 6370000000 HC RX 637 (ALT 250 FOR IP)

## 2023-07-31 PROCEDURE — 2580000003 HC RX 258

## 2023-07-31 PROCEDURE — 99285 EMERGENCY DEPT VISIT HI MDM: CPT

## 2023-07-31 PROCEDURE — 2060000000 HC ICU INTERMEDIATE R&B

## 2023-07-31 PROCEDURE — 6360000002 HC RX W HCPCS

## 2023-07-31 PROCEDURE — 81001 URINALYSIS AUTO W/SCOPE: CPT

## 2023-07-31 PROCEDURE — 87040 BLOOD CULTURE FOR BACTERIA: CPT

## 2023-07-31 PROCEDURE — 85025 COMPLETE CBC W/AUTO DIFF WBC: CPT

## 2023-07-31 PROCEDURE — 83605 ASSAY OF LACTIC ACID: CPT

## 2023-07-31 PROCEDURE — 1200000000 HC SEMI PRIVATE

## 2023-07-31 PROCEDURE — 96375 TX/PRO/DX INJ NEW DRUG ADDON: CPT

## 2023-07-31 RX ORDER — SODIUM CHLORIDE 0.9 % (FLUSH) 0.9 %
5-40 SYRINGE (ML) INJECTION PRN
Status: DISCONTINUED | OUTPATIENT
Start: 2023-07-31 | End: 2023-08-05 | Stop reason: HOSPADM

## 2023-07-31 RX ORDER — LORAZEPAM 2 MG/ML
2 INJECTION INTRAMUSCULAR
Status: DISCONTINUED | OUTPATIENT
Start: 2023-07-31 | End: 2023-08-05 | Stop reason: HOSPADM

## 2023-07-31 RX ORDER — LORAZEPAM 1 MG/1
4 TABLET ORAL
Status: DISCONTINUED | OUTPATIENT
Start: 2023-07-31 | End: 2023-08-05 | Stop reason: HOSPADM

## 2023-07-31 RX ORDER — ONDANSETRON 4 MG/1
4 TABLET, ORALLY DISINTEGRATING ORAL EVERY 8 HOURS PRN
Status: DISCONTINUED | OUTPATIENT
Start: 2023-07-31 | End: 2023-08-05 | Stop reason: HOSPADM

## 2023-07-31 RX ORDER — ACETAMINOPHEN 650 MG/1
650 SUPPOSITORY RECTAL EVERY 6 HOURS PRN
Status: DISCONTINUED | OUTPATIENT
Start: 2023-07-31 | End: 2023-08-05 | Stop reason: HOSPADM

## 2023-07-31 RX ORDER — FERROUS SULFATE 325(65) MG
325 TABLET ORAL 2 TIMES DAILY
Status: DISCONTINUED | OUTPATIENT
Start: 2023-07-31 | End: 2023-08-05 | Stop reason: HOSPADM

## 2023-07-31 RX ORDER — SODIUM CHLORIDE 0.9 % (FLUSH) 0.9 %
5-40 SYRINGE (ML) INJECTION EVERY 12 HOURS SCHEDULED
Status: DISCONTINUED | OUTPATIENT
Start: 2023-07-31 | End: 2023-08-05 | Stop reason: HOSPADM

## 2023-07-31 RX ORDER — SODIUM CHLORIDE 9 MG/ML
INJECTION, SOLUTION INTRAVENOUS CONTINUOUS
Status: DISCONTINUED | OUTPATIENT
Start: 2023-07-31 | End: 2023-07-31

## 2023-07-31 RX ORDER — LANOLIN ALCOHOL/MO/W.PET/CERES
200 CREAM (GRAM) TOPICAL DAILY
Status: DISCONTINUED | OUTPATIENT
Start: 2023-08-01 | End: 2023-08-05 | Stop reason: HOSPADM

## 2023-07-31 RX ORDER — SODIUM CHLORIDE 9 MG/ML
INJECTION, SOLUTION INTRAVENOUS PRN
Status: DISCONTINUED | OUTPATIENT
Start: 2023-07-31 | End: 2023-08-05 | Stop reason: HOSPADM

## 2023-07-31 RX ORDER — CALCIUM CARBONATE 500 MG/1
1000 TABLET, CHEWABLE ORAL 3 TIMES DAILY PRN
Status: DISCONTINUED | OUTPATIENT
Start: 2023-07-31 | End: 2023-08-05 | Stop reason: HOSPADM

## 2023-07-31 RX ORDER — 0.9 % SODIUM CHLORIDE 0.9 %
1000 INTRAVENOUS SOLUTION INTRAVENOUS ONCE
Status: COMPLETED | OUTPATIENT
Start: 2023-07-31 | End: 2023-07-31

## 2023-07-31 RX ORDER — LACTULOSE 10 G/15ML
20 SOLUTION ORAL 3 TIMES DAILY
Status: DISCONTINUED | OUTPATIENT
Start: 2023-07-31 | End: 2023-08-01

## 2023-07-31 RX ORDER — LORAZEPAM 1 MG/1
3 TABLET ORAL
Status: DISCONTINUED | OUTPATIENT
Start: 2023-07-31 | End: 2023-08-05 | Stop reason: HOSPADM

## 2023-07-31 RX ORDER — LORAZEPAM 1 MG/1
2 TABLET ORAL
Status: DISCONTINUED | OUTPATIENT
Start: 2023-07-31 | End: 2023-08-05 | Stop reason: HOSPADM

## 2023-07-31 RX ORDER — FUROSEMIDE 20 MG/1
20 TABLET ORAL DAILY
Status: DISCONTINUED | OUTPATIENT
Start: 2023-08-01 | End: 2023-08-05 | Stop reason: HOSPADM

## 2023-07-31 RX ORDER — LORAZEPAM 1 MG/1
1 TABLET ORAL
Status: DISCONTINUED | OUTPATIENT
Start: 2023-07-31 | End: 2023-08-05 | Stop reason: HOSPADM

## 2023-07-31 RX ORDER — GAUZE BANDAGE 2" X 2"
100 BANDAGE TOPICAL DAILY
Status: DISCONTINUED | OUTPATIENT
Start: 2023-07-31 | End: 2023-08-05 | Stop reason: HOSPADM

## 2023-07-31 RX ORDER — SODIUM CHLORIDE 9 MG/ML
INJECTION, SOLUTION INTRAVENOUS CONTINUOUS
Status: DISCONTINUED | OUTPATIENT
Start: 2023-07-31 | End: 2023-08-04

## 2023-07-31 RX ORDER — MORPHINE SULFATE 4 MG/ML
4 INJECTION, SOLUTION INTRAMUSCULAR; INTRAVENOUS ONCE
Status: COMPLETED | OUTPATIENT
Start: 2023-07-31 | End: 2023-07-31

## 2023-07-31 RX ORDER — MAGNESIUM SULFATE 1 G/100ML
1000 INJECTION INTRAVENOUS PRN
Status: DISCONTINUED | OUTPATIENT
Start: 2023-07-31 | End: 2023-08-05 | Stop reason: HOSPADM

## 2023-07-31 RX ORDER — PANTOPRAZOLE SODIUM 40 MG/1
40 TABLET, DELAYED RELEASE ORAL DAILY
Status: DISCONTINUED | OUTPATIENT
Start: 2023-08-01 | End: 2023-08-01

## 2023-07-31 RX ORDER — SPIRONOLACTONE 25 MG/1
25 TABLET ORAL DAILY
Status: DISCONTINUED | OUTPATIENT
Start: 2023-08-01 | End: 2023-08-01

## 2023-07-31 RX ORDER — PNV NO.95/FERROUS FUM/FOLIC AC 28MG-0.8MG
1 TABLET ORAL DAILY
Status: DISCONTINUED | OUTPATIENT
Start: 2023-08-01 | End: 2023-07-31 | Stop reason: CLARIF

## 2023-07-31 RX ORDER — LORAZEPAM 2 MG/ML
1 INJECTION INTRAMUSCULAR
Status: DISCONTINUED | OUTPATIENT
Start: 2023-07-31 | End: 2023-08-05 | Stop reason: HOSPADM

## 2023-07-31 RX ORDER — SODIUM CHLORIDE 0.9 % (FLUSH) 0.9 %
10 SYRINGE (ML) INJECTION PRN
Status: DISCONTINUED | OUTPATIENT
Start: 2023-07-31 | End: 2023-08-05 | Stop reason: HOSPADM

## 2023-07-31 RX ORDER — ONDANSETRON 2 MG/ML
4 INJECTION INTRAMUSCULAR; INTRAVENOUS EVERY 6 HOURS PRN
Status: DISCONTINUED | OUTPATIENT
Start: 2023-07-31 | End: 2023-08-05 | Stop reason: HOSPADM

## 2023-07-31 RX ORDER — POTASSIUM CHLORIDE 20 MEQ/1
40 TABLET, EXTENDED RELEASE ORAL PRN
Status: DISCONTINUED | OUTPATIENT
Start: 2023-07-31 | End: 2023-08-05 | Stop reason: HOSPADM

## 2023-07-31 RX ORDER — 0.9 % SODIUM CHLORIDE 0.9 %
500 INTRAVENOUS SOLUTION INTRAVENOUS ONCE
Status: COMPLETED | OUTPATIENT
Start: 2023-07-31 | End: 2023-07-31

## 2023-07-31 RX ORDER — POTASSIUM CHLORIDE 7.45 MG/ML
10 INJECTION INTRAVENOUS PRN
Status: DISCONTINUED | OUTPATIENT
Start: 2023-07-31 | End: 2023-08-05 | Stop reason: HOSPADM

## 2023-07-31 RX ORDER — LORAZEPAM 2 MG/ML
4 INJECTION INTRAMUSCULAR
Status: DISCONTINUED | OUTPATIENT
Start: 2023-07-31 | End: 2023-08-05 | Stop reason: HOSPADM

## 2023-07-31 RX ORDER — M-VIT,TX,IRON,MINS/CALC/FOLIC 27MG-0.4MG
1 TABLET ORAL DAILY
Status: DISCONTINUED | OUTPATIENT
Start: 2023-08-01 | End: 2023-08-05 | Stop reason: HOSPADM

## 2023-07-31 RX ORDER — LORAZEPAM 2 MG/ML
3 INJECTION INTRAMUSCULAR
Status: DISCONTINUED | OUTPATIENT
Start: 2023-07-31 | End: 2023-08-05 | Stop reason: HOSPADM

## 2023-07-31 RX ORDER — ENOXAPARIN SODIUM 100 MG/ML
40 INJECTION SUBCUTANEOUS DAILY
Status: DISCONTINUED | OUTPATIENT
Start: 2023-07-31 | End: 2023-08-05 | Stop reason: HOSPADM

## 2023-07-31 RX ORDER — POLYETHYLENE GLYCOL 3350 17 G/17G
17 POWDER, FOR SOLUTION ORAL DAILY PRN
Status: DISCONTINUED | OUTPATIENT
Start: 2023-07-31 | End: 2023-08-05 | Stop reason: HOSPADM

## 2023-07-31 RX ORDER — ACETAMINOPHEN 325 MG/1
650 TABLET ORAL EVERY 6 HOURS PRN
Status: DISCONTINUED | OUTPATIENT
Start: 2023-07-31 | End: 2023-08-05 | Stop reason: HOSPADM

## 2023-07-31 RX ORDER — ONDANSETRON 2 MG/ML
4 INJECTION INTRAMUSCULAR; INTRAVENOUS ONCE
Status: COMPLETED | OUTPATIENT
Start: 2023-07-31 | End: 2023-07-31

## 2023-07-31 RX ADMIN — ONDANSETRON 4 MG: 2 INJECTION INTRAMUSCULAR; INTRAVENOUS at 22:30

## 2023-07-31 RX ADMIN — SODIUM CHLORIDE 75 ML/HR: 9 INJECTION, SOLUTION INTRAVENOUS at 19:59

## 2023-07-31 RX ADMIN — CEFTRIAXONE SODIUM 1000 MG: 1 INJECTION, POWDER, FOR SOLUTION INTRAMUSCULAR; INTRAVENOUS at 23:51

## 2023-07-31 RX ADMIN — SODIUM CHLORIDE 500 ML: 9 INJECTION, SOLUTION INTRAVENOUS at 15:55

## 2023-07-31 RX ADMIN — Medication 2 MG: at 22:18

## 2023-07-31 RX ADMIN — ONDANSETRON 4 MG: 2 INJECTION INTRAMUSCULAR; INTRAVENOUS at 16:10

## 2023-07-31 RX ADMIN — Medication 2 MG: at 19:22

## 2023-07-31 RX ADMIN — FERROUS SULFATE TAB 325 MG (65 MG ELEMENTAL FE) 325 MG: 325 (65 FE) TAB at 21:52

## 2023-07-31 RX ADMIN — SODIUM CHLORIDE 1000 ML: 9 INJECTION, SOLUTION INTRAVENOUS at 19:04

## 2023-07-31 RX ADMIN — MORPHINE SULFATE 4 MG: 4 INJECTION, SOLUTION INTRAMUSCULAR; INTRAVENOUS at 16:11

## 2023-07-31 RX ADMIN — ENOXAPARIN SODIUM 40 MG: 100 INJECTION SUBCUTANEOUS at 20:01

## 2023-07-31 RX ADMIN — ANTACID TABLETS 1000 MG: 500 TABLET, CHEWABLE ORAL at 21:52

## 2023-07-31 RX ADMIN — SODIUM CHLORIDE: 9 INJECTION, SOLUTION INTRAVENOUS at 17:57

## 2023-07-31 RX ADMIN — LACTULOSE 20 G: 20 SOLUTION ORAL at 21:53

## 2023-07-31 RX ADMIN — CEFAZOLIN 1000 MG: 1 INJECTION, POWDER, FOR SOLUTION INTRAMUSCULAR; INTRAVENOUS at 18:30

## 2023-07-31 RX ADMIN — Medication 2 MG: at 23:56

## 2023-07-31 RX ADMIN — THIAMINE HCL TAB 100 MG 100 MG: 100 TAB at 20:00

## 2023-07-31 ASSESSMENT — ENCOUNTER SYMPTOMS
CHEST TIGHTNESS: 0
NAUSEA: 1
VOMITING: 1
SHORTNESS OF BREATH: 0
COLOR CHANGE: 1
DIARRHEA: 0
ABDOMINAL PAIN: 1
CONSTIPATION: 0

## 2023-07-31 ASSESSMENT — PAIN SCALES - GENERAL
PAINLEVEL_OUTOF10: 0
PAINLEVEL_OUTOF10: 7

## 2023-07-31 ASSESSMENT — PAIN DESCRIPTION - ORIENTATION: ORIENTATION: MID;LOWER

## 2023-07-31 ASSESSMENT — PAIN DESCRIPTION - LOCATION: LOCATION: ABDOMEN

## 2023-07-31 ASSESSMENT — PAIN - FUNCTIONAL ASSESSMENT: PAIN_FUNCTIONAL_ASSESSMENT: NONE - DENIES PAIN

## 2023-07-31 NOTE — ED PROVIDER NOTES
[facility] ED  Emergency Department Encounter  Emergency Medicine Resident     Pt Name:Frank Pathak  MRN: [id number]  [facility] 1960  Date of evaluation: 7/31/23  PCP:  VASU Mcnamara  Note Started: 3:21 PM EDT      CHIEF COMPLAINT       Chief Complaint   Patient presents with    Fatigue    Dizziness       HISTORY OF PRESENT ILLNESS  (Location/Symptom, Timing/Onset, Context/Setting, Quality, Duration, Modifying Factors, Severity.)      Isaiah Epps is a 58-year-old male presents to the ED with 2 days of fatigue, dizziness and decreased p.o. intake. Patient has history significant for alcoholic cirrhosis, esophageal cancer with metastasis and recent ileostomy creation in March 2023. Patient was seen by his wound care nurse today for ostomy bag change when she noticed some erythemic changes around the ostomy site and recommend that he be seen in the ED. Patient reports has been having leakage around the ostomy site causing skin maceration. Patient states has been also having some fevers and chills but denies any chest pain, shortness of breath or abdominal pain.     PAST MEDICAL / SURGICAL / SOCIAL / FAMILY HISTORY      has a past medical history of Abnormal EKG, Acute deep vein thrombosis (DVT) of brachial vein of right upper extremity ([address]), Adenocarcinoma in a polyp ([address]), Alcoholic cirrhosis of liver with ascites ([address]), Anemia, Anxiety, Arthritis, Back pain, chronic, Lezama esophagus, Bleeding gastric varices, BPH (benign prostatic hypertrophy), Cholelithiasis, Cirrhosis ([address]), COVID-19, COVID-19 vaccine series completed, DDD (degenerative disc disease), lumbar, Depression, Esophageal cancer ([address]), Esophageal varices ([address]), Fatty liver, GERD (gastroesophageal reflux disease), GI bleed, Heart murmur, Hep C w/o coma, chronic ([address]), Hiatal hernia, History of alcohol abuse, History of blood transfusion, History of colon polyps, History of tobacco abuse, [place] (hard of hearing),

## 2023-07-31 NOTE — DISCHARGE INSTRUCTIONS
[id number] [place] [address]   Outpatient Ostomy Clinic     Discharge Instructions     DATE- 07/31/23       HOME CARE [address]        SUPPLIES NEEDED-   #[id number]     [employer] [place] Flex Flat Skin Barrier 2-pc. (red)  #[id number]     [employer] [place] Flex EasiClose Wide Drainable Pouch 2-pc. with filter (red)  #[id number]     Brava Protective Seal 2.5mm thin  #[id number]     Brava Skin Barrier Wipe  #[id number]     Brava Ostomy Powder 1 oz. OSTOMY INSTRUCTIONS-    -Remove previous pouch, best to do first thing in the morning if planned  -Cleanse the area around the stoma with water only, or shower. Dry well  -Use skin barrier wipe around the stoma and to area where pouch will sit  -Wipe the stoma of any mucus  -Place protective seal around the stoma- don't get crazy making it perfect  -Place the pouch to fit over the protective seal  -Use fingers to moosh the pouch into the seal immediately around the stoma then push all around to be sure the whole pouch is sealed  -Place warm hand over the stoma for a minute or so  -Sit still for about 20 minutes to allow pouch to adhere well before moving around         [address] [facility]-    Patient escorted to ER from [facility] for concern of high-output ileostomy. FOLLOW UP APPOINTMENT FOR OSTOMY CARE- CALL IF NEEDED [phone]        If you need medical attention outside of the business hours of the [address] [place] [address] please contact your PCP or go to the nearest emergency room.           Patient Signature:__________________________________________________ DATE__________   Electronically signed by Zuleyka Aguilar RN on 7/31/2023 at 2:53 PM

## 2023-07-31 NOTE — PROGRESS NOTES
Pharmacy Medication History Note      List of current medications patient is taking is complete. Source of information: Patient, dispense history, OARRS    Changes made to medication list:  Medications flagged for removal (include reason, ex. noncompliance):  NONE    Medications removed (include reason, ex. therapy complete or physician discontinued): Magnesium oxide: duplicate medication    Medications added/doses adjusted:  NONE    Other notes (ex. Recent course of antibiotics, Coumadin dosing):  OARRS negative  Patient reports not taking any medications this morning besides ondansetron due to nausea. Denies use of other OTC or herbal medications.       Allergies clarified    Medication list provided to the patient:NONE  Medication education provided to the patient:NONE      Electronically signed by Karthik Mcclain on 7/31/2023 at 6:41 PM

## 2023-07-31 NOTE — PROGRESS NOTES
Report called to ER from [facility]. Pt tachycardic, shaky, falling asleep during assessment. He reports that he has been having chills, but no fevers, stated \"I knew I shouldn't be driving here today. \" History of alcohol abuse, cannot remember the last time he had a drink. Poor nutritional status- has not been eating or drinking much due to concern of output from his ostomy. Concern for high output ileostomy, dehydration, electrolyte imbalance. Message left for ex wife, Lesia Ortiz by Augusta Yu, [address] [facility] at pt's request. Lesia Ortiz returned call and stated that pt's \"electrolytes are always out of whack\" and pt has had problems since he got his ileostomy in April. He reportedly gets routine lab work done.  She states that pt is supposed to have an appointment with Dr Sonya Monterroso next month to discuss ostomy reversal.
care, counseling/discussion    ACP (advance care planning)    Palliative care encounter    S/P TIPS (transjugular intrahepatic portosystemic shunt)    Acute metabolic encephalopathy    Lezama's esophagus with dysplasia    Mastoid disorder, bilateral    Acute deep vein thrombosis (DVT) of brachial vein of right upper extremity (HCC)    Chronic hepatitis C without hepatic coma (HCC)    Swelling of joint of upper arm, right    Anasarca    Lightheadedness    Alcohol withdrawal syndrome, with delirium (HCC)    Hemorrhage of rectum and anus    Bowel perforation (HCC)    Electrolyte imbalance    Hyponatremia    Pain of upper abdomen    Ileus (HCC)    Mild malnutrition ([address])    Delirium due to another medical condition    ROBYN (acute kidney injury) ([address])    Ileostomy in place ([address])    Peristomal dermatitis associated with moisture         Ostomy and Peristomal skin: severe denudation for about 10cm around the stoma  The patient did not have a pouch on upon arrival to clinic today. He has not been able to get a pouch to stick well    Stoma Type:end ileo   Diameter: 40mm  Location: RUQ  Protrusion: yes  Mucosal condition and color: Red and moist  Mucocutaneous junction: Intact  Peristomal Skin: denuded  Character of output: thin watery green  Current pouching system: flat one piece  Current wear time: 0-1 day  Recommendations: crusting technique, will need to thicken and reduce output  Comment: Domeboro solution used to soak peristomal skin today for soothing and improving denuded skin. A crusting technique was then used prior to pouch application. The patient was taught how to repeat this for future pouching.         Plan:     Plan of Care:     -Domeboro soak today in clinic of peristomal skin denudation    -Use crusting technique prior to pouching to improve pouch longevity and allow for heal time of the peristomal skin    -The patient was taken to ER after his clinic appt to evaluate dizziness, weakness,

## 2023-07-31 NOTE — PROGRESS NOTES
Fili Lara is a 60 y.o. Non- / non  male who presents with Fatigue and Dizziness   and is admitted to the hospital for the management of Cellulitis. According to patient, he has been feeling weak and unwell for the past couple of days. Reports that he has been having difficulty getting a good seal on his ileostomy bag, resulting in leaking and severe skin excoriation. He was seen by the wound/ostomy nurse today, who transported him to the ED for evaluation of weakness, dizziness, and nausea, as well as concerns for having a high-output ileostomy. Symptoms are associated with poor po intake. Patient reports that he does not eat or drink as much as he should in an attempt to decrease the frequency that his ileostomy bag needs emptied or changed. Additionally, he has abdominal pain r/t denuded skin around ostomy site. Denies fever, chills, chest pain, cough, and urinary symptoms. There are no aggravating or alleviating factors. Symptoms are reported as constant and moderate to severe; ongoing for the past couple of days. Additionally, patient is noted to have a history of alcohol abuse. He admits to drinking a few 25 oz - high alcohol beers \"a couple of days ago. \"   He was noted to be tremulous in ED and CIWA scale was initiated. Smell of alcohol noted on his breath.     Patient status inpatient in the [facility] Problems             Last Modified POA    * (Principal) Cellulitis 7/31/2023 Yes     Cellulitis of abdominal wall  -Code Sepsis called in ED  -CT abdomen/pelvis - no acute abnormality within abd/pelvis  -IV Ancef initiated in ED  --Changed to Rocephin upon admission to unit  -Blood cultures pending x 2  -IV Normal Saline boluses in ED  -Wound/ostomy eval and treat   -Lactic acid - 3.4, then 2.4; repeat pending  -Blood Cultures pending x 2  -WBC 7.0  -Dietitian consult initiated     Dehydration/ROBYN  -Creatinine 1.5; Baseline 1.0  -multiple contributing

## 2023-07-31 NOTE — H&P
[facility] Internal Medicine  Magnolia Mena MD; Yovany Ga MD; Laureen Dave MD; MD Lina Tenorio MD; Taya Devi MD    [facility]   [address]    HISTORY AND PHYSICAL EXAMINATION            Date:   7/31/2023  Patient name:  Cristian Rubio  Date of admission:  7/31/2023  3:17 PM  MRN:   [id number]  Account:  [de-identified]  YOB: 1960  PCP:    VASU Silvestre  Room:   05/05  Code Status:    Prior    Chief Complaint:     Chief Complaint   Patient presents with    Fatigue    Dizziness       History Obtained From:     Patient medical record nursing staff    History of Present Illness:     Cristian Rubio is a 60 y.o. Non- / non  male who presents with Fatigue and Dizziness   and is admitted to the hospital for the management of Cellulitis. y.o. Non- / non  male who presents with Fatigue and Dizziness   and is admitted to the hospital for the management of Cellulitis. According to patient, he has been feeling weak and unwell for the past couple of days. Reports that he has been having difficulty getting a good seal on his ileostomy bag, resulting in leaking and severe skin excoriation. He was seen by the wound/ostomy nurse today, who transported him to the ED for evaluation of weakness, dizziness, and nausea, as well as concerns for having a high-output ileostomy. Symptoms are associated with poor po intake. Patient reports that he does not eat or drink as much as he should in an attempt to decrease the frequency that his ileostomy bag needs emptied or changed. Additionally, he has abdominal pain r/t denuded skin around ostomy site. Denies fever, chills, chest pain, cough, and urinary symptoms. There are no aggravating or alleviating factors. Symptoms are reported as constant and moderate to severe; ongoing for the past couple of days. CO2 20 20 - 31 mmol/L    Anion Gap 18 (H) 9 - 17 mmol/L   Protime-INR    Collection Time: 07/31/23  4:00 PM   Result Value Ref Range    Protime 15.3 (H) 11.8 - 14.6 sec    INR 1.2    APTT    Collection Time: 07/31/23  4:00 PM   Result Value Ref Range    PTT 31.6 24.0 - 36.0 sec   Troponin    Collection Time: 07/31/23  4:00 PM   Result Value Ref Range    Troponin, High Sensitivity 36 (H) 0 - 22 ng/L   Ammonia    Collection Time: 07/31/23  4:00 PM   Result Value Ref Range    Ammonia 35 16 - 60 umol/L   Troponin    Collection Time: 07/31/23  5:14 PM   Result Value Ref Range    Troponin, High Sensitivity 32 (H) 0 - 22 ng/L   EKG 12 Lead    Collection Time: 07/31/23  5:15 PM   Result Value Ref Range    Ventricular Rate 85 BPM    Atrial Rate 85 BPM    P-R Interval 166 ms    QRS Duration 90 ms    Q-T Interval 384 ms    QTc Calculation (Bazett) 456 ms    P Axis 40 degrees    R Axis 45 degrees    T Axis 23 degrees       Imaging/Diagnostics:  CT ABDOMEN PELVIS WO CONTRAST Additional Contrast? None    Result Date: 7/31/2023  No acute abnormality within the abdomen/pelvis. Stable findings described above.        Assessment :      Hospital Problems             Last Modified POA    * (Principal) Cellulitis 7/31/2023 Yes       Plan:     Patient status inpatient in the Progressive Unit/Step down    78-year-old gentleman with a history of alcohol dependence alcoholic liver cirrhosis recent history of ischemic bowel gangrene status post bowel surgery and colostomy in place still drinks alcohol claims once a week history of street drug abuse including cocaine patient on diuretics for liver cirrhosis and ascites was taking Lasix 40 twice a day and Aldactone 50 once a day admitted   patient walks with a walker lives alone with his dog    Patient admitted with cellulitis around the stoma likely secondary to excessive stoma output  We will discontinue lactulose we will start rifaximin 550 mg twice a day  Case discussed with stoma nurse

## 2023-07-31 NOTE — ED NOTES
Spoke with Angie Fernandez NP on the phone and informed her patient in alcohol withdrawal and has tremors of upper extremities and CIWA of 14 and  patient needs to be upgraded to PCU bed and CIWA scale now in place and patient was given Ativan. Patient was also a Code S with lactic acid of 3.4 initially and only received 500 ml fluid bolus and then repeat Lactic Acid 2.4 and Dr Carmina Alves ER resident ordered another liter bolus of 0.9 normal saline. Also patient has received Ancef already and sign and held orders say to get wound gram stain and culture and Angie Fernandez said she would discontinue gm stain and culture order and come down to ER to see patient.      Cathy Jones RN  07/31/23 1950

## 2023-08-01 PROBLEM — E44.0 MODERATE MALNUTRITION (HCC): Chronic | Status: ACTIVE | Noted: 2023-07-01

## 2023-08-01 LAB
ANION GAP SERPL CALCULATED.3IONS-SCNC: 14 MMOL/L (ref 9–17)
BACTERIA URNS QL MICRO: ABNORMAL
BILIRUB UR QL STRIP: NEGATIVE
BUN SERPL-MCNC: 16 MG/DL (ref 8–23)
CALCIUM SERPL-MCNC: 8.2 MG/DL (ref 8.6–10.4)
CASTS #/AREA URNS LPF: ABNORMAL /LPF
CHLORIDE SERPL-SCNC: 96 MMOL/L (ref 98–107)
CLARITY UR: ABNORMAL
CO2 SERPL-SCNC: 22 MMOL/L (ref 20–31)
COLOR UR: YELLOW
CREAT SERPL-MCNC: 1.1 MG/DL (ref 0.7–1.2)
EKG ATRIAL RATE: 85 BPM
EKG P AXIS: 40 DEGREES
EKG P-R INTERVAL: 166 MS
EKG Q-T INTERVAL: 384 MS
EKG QRS DURATION: 90 MS
EKG QTC CALCULATION (BAZETT): 456 MS
EKG R AXIS: 45 DEGREES
EKG T AXIS: 23 DEGREES
EKG VENTRICULAR RATE: 85 BPM
EPI CELLS #/AREA URNS HPF: ABNORMAL /HPF
ERYTHROCYTE [DISTWIDTH] IN BLOOD BY AUTOMATED COUNT: 16.1 % (ref 11.5–14.9)
GFR SERPL CREATININE-BSD FRML MDRD: >60 ML/MIN/1.73M2
GLUCOSE SERPL-MCNC: 93 MG/DL (ref 70–99)
GLUCOSE UR STRIP-MCNC: NEGATIVE MG/DL
HCT VFR BLD AUTO: 33 % (ref 41–53)
HGB BLD-MCNC: 10.8 G/DL (ref 13.5–17.5)
HGB UR QL STRIP.AUTO: ABNORMAL
KETONES UR STRIP-MCNC: ABNORMAL MG/DL
LACTATE BLDV-SCNC: 2.3 MMOL/L (ref 0.5–2.2)
LEUKOCYTE ESTERASE UR QL STRIP: ABNORMAL
MAGNESIUM SERPL-MCNC: 1.2 MG/DL (ref 1.6–2.6)
MCH RBC QN AUTO: 32.3 PG (ref 26–34)
MCHC RBC AUTO-ENTMCNC: 32.8 G/DL (ref 31–37)
MCV RBC AUTO: 98.4 FL (ref 80–100)
NITRITE UR QL STRIP: NEGATIVE
PH UR STRIP: 5.5 [PH] (ref 5–8)
PHOSPHATE SERPL-MCNC: 2.8 MG/DL (ref 2.5–4.5)
PLATELET # BLD AUTO: 83 K/UL (ref 150–450)
PMV BLD AUTO: 7.3 FL (ref 6–12)
POTASSIUM SERPL-SCNC: 3.6 MMOL/L (ref 3.7–5.3)
PROT UR STRIP-MCNC: ABNORMAL MG/DL
RBC # BLD AUTO: 3.35 M/UL (ref 4.5–5.9)
RBC #/AREA URNS HPF: ABNORMAL /HPF
SODIUM SERPL-SCNC: 132 MMOL/L (ref 135–144)
SP GR UR STRIP: 1.02 (ref 1–1.03)
UROBILINOGEN UR STRIP-ACNC: NORMAL EU/DL (ref 0–1)
WBC #/AREA URNS HPF: ABNORMAL /HPF
WBC OTHER # BLD: 5.3 K/UL (ref 3.5–11)

## 2023-08-01 PROCEDURE — 6370000000 HC RX 637 (ALT 250 FOR IP): Performed by: INTERNAL MEDICINE

## 2023-08-01 PROCEDURE — 99213 OFFICE O/P EST LOW 20 MIN: CPT

## 2023-08-01 PROCEDURE — 83735 ASSAY OF MAGNESIUM: CPT

## 2023-08-01 PROCEDURE — 84100 ASSAY OF PHOSPHORUS: CPT

## 2023-08-01 PROCEDURE — 2060000000 HC ICU INTERMEDIATE R&B

## 2023-08-01 PROCEDURE — 6370000000 HC RX 637 (ALT 250 FOR IP)

## 2023-08-01 PROCEDURE — 2580000003 HC RX 258: Performed by: NURSE PRACTITIONER

## 2023-08-01 PROCEDURE — 93010 ELECTROCARDIOGRAM REPORT: CPT | Performed by: INTERNAL MEDICINE

## 2023-08-01 PROCEDURE — 80048 BASIC METABOLIC PNL TOTAL CA: CPT

## 2023-08-01 PROCEDURE — 2580000003 HC RX 258: Performed by: INTERNAL MEDICINE

## 2023-08-01 PROCEDURE — 6360000002 HC RX W HCPCS: Performed by: INTERNAL MEDICINE

## 2023-08-01 PROCEDURE — 85027 COMPLETE CBC AUTOMATED: CPT

## 2023-08-01 PROCEDURE — 6360000002 HC RX W HCPCS

## 2023-08-01 PROCEDURE — 6370000000 HC RX 637 (ALT 250 FOR IP): Performed by: NURSE PRACTITIONER

## 2023-08-01 PROCEDURE — 36415 COLL VENOUS BLD VENIPUNCTURE: CPT

## 2023-08-01 RX ORDER — SPIRONOLACTONE 25 MG/1
50 TABLET ORAL DAILY
Status: DISCONTINUED | OUTPATIENT
Start: 2023-08-01 | End: 2023-08-05 | Stop reason: HOSPADM

## 2023-08-01 RX ORDER — PROCHLORPERAZINE EDISYLATE 5 MG/ML
10 INJECTION INTRAMUSCULAR; INTRAVENOUS EVERY 6 HOURS PRN
Status: DISCONTINUED | OUTPATIENT
Start: 2023-08-01 | End: 2023-08-05 | Stop reason: HOSPADM

## 2023-08-01 RX ADMIN — Medication 2 MG: at 05:49

## 2023-08-01 RX ADMIN — FERROUS SULFATE TAB 325 MG (65 MG ELEMENTAL FE) 325 MG: 325 (65 FE) TAB at 20:01

## 2023-08-01 RX ADMIN — Medication 2 MG: at 16:05

## 2023-08-01 RX ADMIN — SODIUM CHLORIDE: 9 INJECTION, SOLUTION INTRAVENOUS at 04:23

## 2023-08-01 RX ADMIN — RIFAXIMIN 400 MG: 200 TABLET ORAL at 16:04

## 2023-08-01 RX ADMIN — ENOXAPARIN SODIUM 40 MG: 100 INJECTION SUBCUTANEOUS at 09:18

## 2023-08-01 RX ADMIN — MAGNESIUM SULFATE HEPTAHYDRATE 1000 MG: 1 INJECTION, SOLUTION INTRAVENOUS at 08:30

## 2023-08-01 RX ADMIN — MAGNESIUM SULFATE HEPTAHYDRATE 1000 MG: 1 INJECTION, SOLUTION INTRAVENOUS at 11:16

## 2023-08-01 RX ADMIN — LORAZEPAM 3 MG: 2 INJECTION INTRAMUSCULAR; INTRAVENOUS at 20:49

## 2023-08-01 RX ADMIN — LACTULOSE 20 G: 20 SOLUTION ORAL at 09:20

## 2023-08-01 RX ADMIN — Medication 2 MG: at 22:30

## 2023-08-01 RX ADMIN — THIAMINE HCL TAB 100 MG 100 MG: 100 TAB at 09:20

## 2023-08-01 RX ADMIN — ACETAMINOPHEN 650 MG: 325 TABLET ORAL at 19:02

## 2023-08-01 RX ADMIN — SODIUM CHLORIDE: 9 INJECTION, SOLUTION INTRAVENOUS at 22:21

## 2023-08-01 RX ADMIN — ANTACID TABLETS 1000 MG: 500 TABLET, CHEWABLE ORAL at 19:02

## 2023-08-01 RX ADMIN — FERROUS SULFATE TAB 325 MG (65 MG ELEMENTAL FE) 325 MG: 325 (65 FE) TAB at 09:20

## 2023-08-01 RX ADMIN — MAGNESIUM SULFATE HEPTAHYDRATE 1000 MG: 1 INJECTION, SOLUTION INTRAVENOUS at 06:26

## 2023-08-01 RX ADMIN — Medication 2 MG: at 01:04

## 2023-08-01 RX ADMIN — Medication 2 MG: at 02:28

## 2023-08-01 RX ADMIN — MAGNESIUM SULFATE HEPTAHYDRATE 1000 MG: 1 INJECTION, SOLUTION INTRAVENOUS at 09:32

## 2023-08-01 RX ADMIN — Medication 200 MG: at 09:20

## 2023-08-01 RX ADMIN — MULTIPLE VITAMINS W/ MINERALS TAB 1 TABLET: TAB at 09:20

## 2023-08-01 RX ADMIN — SPIRONOLACTONE 50 MG: 25 TABLET ORAL at 12:04

## 2023-08-01 RX ADMIN — ACETAMINOPHEN 650 MG: 325 TABLET ORAL at 02:28

## 2023-08-01 RX ADMIN — ONDANSETRON 4 MG: 2 INJECTION INTRAMUSCULAR; INTRAVENOUS at 18:35

## 2023-08-01 RX ADMIN — PANTOPRAZOLE SODIUM 40 MG: 40 TABLET, DELAYED RELEASE ORAL at 09:20

## 2023-08-01 RX ADMIN — CEFTRIAXONE SODIUM 1000 MG: 1 INJECTION, POWDER, FOR SOLUTION INTRAMUSCULAR; INTRAVENOUS at 22:30

## 2023-08-01 RX ADMIN — Medication 2 MG: at 04:18

## 2023-08-01 RX ADMIN — RIFAXIMIN 400 MG: 200 TABLET ORAL at 20:01

## 2023-08-01 ASSESSMENT — PAIN SCALES - GENERAL
PAINLEVEL_OUTOF10: 8
PAINLEVEL_OUTOF10: 5
PAINLEVEL_OUTOF10: 0

## 2023-08-01 ASSESSMENT — PAIN DESCRIPTION - DESCRIPTORS
DESCRIPTORS: ACHING
DESCRIPTORS: ACHING

## 2023-08-01 ASSESSMENT — PAIN DESCRIPTION - LOCATION
LOCATION: HEAD
LOCATION: HEAD

## 2023-08-01 NOTE — PROGRESS NOTES
[address] Internal Medicine  Chelsea Diggs MD; Bhakti Rutherford MD; Lesli Villa MD; MD Tracy Cadena MD; Manas Al MD    [facility] Internal Medicine   [address]    HISTORY AND PHYSICAL EXAMINATION            Date:   8/1/2023  Patient name:  Alber Sainz  Date of admission:  7/31/2023  3:17 PM  MRN:   [id number]  Account:  [de-identified]  YOB: 1960  PCP:    VASU Agustin  Room:   2116/2116-01  Code Status:    Full Code    Chief Complaint:     Chief Complaint   Patient presents with    Fatigue    Dizziness       History Obtained From:     Patient medical record nursing staff    History of Present Illness:     Alber Sainz is a 60 y.o. Non- / non  male who presents with Fatigue and Dizziness   and is admitted to the hospital for the management of Cellulitis. y.o. Non- / non  male who presents with Fatigue and Dizziness   and is admitted to the hospital for the management of Cellulitis. According to patient, he has been feeling weak and unwell for the past couple of days. Reports that he has been having difficulty getting a good seal on his ileostomy bag, resulting in leaking and severe skin excoriation. He was seen by the wound/ostomy nurse today, who transported him to the ED for evaluation of weakness, dizziness, and nausea, as well as concerns for having a high-output ileostomy. Symptoms are associated with poor po intake. Patient reports that he does not eat or drink as much as he should in an attempt to decrease the frequency that his ileostomy bag needs emptied or changed. Additionally, he has abdominal pain r/t denuded skin around ostomy site. Denies fever, chills, chest pain, cough, and urinary symptoms. There are no aggravating or alleviating factors. Symptoms are reported as constant and moderate to severe; ongoing for the past couple of days.

## 2023-08-01 NOTE — CONSULTS
Patient has been evaluated emergency department at [facility] emergency room staff with cellulitis around the patient's ileostomy site due to elevating appliance. Patient to be medically managed local wound care/skin care by stoma therapy and to be rehydrated. Patient has had biweekly admissions to the hospital for similar clinical presentations. No surgical intervention required.

## 2023-08-01 NOTE — CONSULTS
Comprehensive Nutrition Assessment    Type and Reason for Visit:  Initial, Consult, Positive Nutrition Screen (Poor appetite and intake, wt loss; Consult due to poor po intake; alcohol abuse, skin breakdown)    Nutrition Recommendations/Plan:   Continue current diet. Provide Ensure Enlive x 2, Ensure Clear x 1 daily. Suggest consideration of additional Folic Acid supplementation. Malnutrition Assessment:  Malnutrition Status: Moderate malnutrition (08/01/23 1604)    Context:  Chronic Illness (Acute on Chronic)     Findings of the 6 clinical characteristics of malnutrition:  Energy Intake:  75% or less estimated energy requirements for 1 month or longer  Weight Loss:  10% over 6 months     Body Fat Loss:  Unable to assess     Muscle Mass Loss:  Unable to assess    Fluid Accumulation:  No significant fluid accumulation     Strength:  Not Performed    Nutrition Assessment:    Pt admitted with cellulitis. Pt resting upon attempted visit. Observed lunch tray with only bites of solid foods and approximately 360 mL of fluids consumed. Pt possibly consumed more after writer left room. Nurse Practitioner's note indicates, \"Patient reports that he does not eat or drink as much as he should in an attempt to decrease the frequency that his ileostomy bag needs emptied or changed. \" Pt has accepted oral nutrition supplements in the past.    Nutrition Related Findings:    No edema. Therapeutic multivitamin with minerals, Thiamine, MgOxide. Other meds and labs reviewed. 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 Ileostomy with reported high output. Wound Type:  (significant peristomal denudation)       Current Nutrition Intake & Therapies:    Average Meal Intake: 26-50% (Estimated average)     ADULT DIET;  Regular  ADULT ORAL NUTRITION SUPPLEMENT; Breakfast, Dinner; Standard High Calorie/High Protein Oral

## 2023-08-01 NOTE — CONSULTS
[facility]  Consult Note      NAME:  Breanne Laws  MEDICAL RECORD NUMBER:  [id number]  AGE: 60 y.o. GENDER: male  : 1960  TODAY'S DATE:  2023    [facility] nurse consult for ostomy care. History of GI hemorrhage and bowel perforation in 2023. He had an emergent exploratory laparotomy on 3/25 by Dr Jose Davis with right colectomy and end ileostomy creation. Pt was seen yesterday in outpatient ostomy clinic and sent to ER with weakness, dizziness, nausea, and shakiness. He has had several ER visits in the last few months with the same complaints. He has had poor intake recently due to concerns about his ileostomy leaking and causing severe denudation of the peristomal skin. Concerns for high output ileostomy, dehydration, and electrolyte imbalance. Upon assessment this morning, pouch that was applied yesterday remains intact without leakage. 150ml green liquid effluent emptied from pouch. Pt also had a large amount of gas in the pouch, which could potentially be causing the flange to lift from the skin and allow effluent to leak underneath. Peristomal skin still very irritated and painful. Spoke with Dr Willie Espinosa regarding concerns for high output ileostomy. Noted that pt was on lactulose, which was changed to rifaximin. Pt to be strict I&O to determine exactly how much output he is having. Extra ostomy supplies left in room in case of leakage during hours when ostomy nurse not available. Will continue to follow .      Cody Rosa, BSN, [address] [facility], [address] [place] [address]  Wound, Ostomy, and Continence Nursing  [phone]

## 2023-08-01 NOTE — PROGRESS NOTES
08/01/23 1225   Encounter Summary   Encounter Overview/Reason   Encounter   Service Provided For: Patient   Referral/Consult From: Ralph   Last Encounter  08/01/23   Complexity of Encounter Low   Spiritual/Emotional needs   Type Spiritual Support   Rituals, Rites and Sacraments   Type   (patient declined anointing 8/1/23)

## 2023-08-01 NOTE — PROGRESS NOTES
Pt arrived to floor via stretcher from ED and was transfered to bed. Vitals taken, telemetry applied. Admission and assessment complete. No distress noted. POC and education initiated and reviewed with patient. Call light within reach, and pt educated on its use. Bed in lowest position, and locked. Side rails up x 2. Denied further questions or needs at this time. Will continue to monitor.

## 2023-08-02 ENCOUNTER — ANESTHESIA (OUTPATIENT)
Dept: ENDOSCOPY | Age: 63
End: 2023-08-02
Payer: COMMERCIAL

## 2023-08-02 ENCOUNTER — APPOINTMENT (OUTPATIENT)
Dept: ULTRASOUND IMAGING | Age: 63
DRG: 602 | End: 2023-08-02
Payer: COMMERCIAL

## 2023-08-02 ENCOUNTER — ANESTHESIA EVENT (OUTPATIENT)
Dept: ENDOSCOPY | Age: 63
End: 2023-08-02
Payer: COMMERCIAL

## 2023-08-02 PROBLEM — K92.0 COFFEE GROUND EMESIS: Status: ACTIVE | Noted: 2023-08-02

## 2023-08-02 PROBLEM — F19.10 SUBSTANCE ABUSE (HCC): Status: ACTIVE | Noted: 2023-08-02

## 2023-08-02 PROBLEM — Z98.890 HISTORY OF ABDOMINAL SURGERY: Status: ACTIVE | Noted: 2023-08-02

## 2023-08-02 PROBLEM — E86.0 DEHYDRATION: Status: ACTIVE | Noted: 2023-08-02

## 2023-08-02 LAB
ANION GAP SERPL CALCULATED.3IONS-SCNC: 10 MMOL/L (ref 9–17)
BUN SERPL-MCNC: 19 MG/DL (ref 8–23)
CALCIUM SERPL-MCNC: 8.7 MG/DL (ref 8.6–10.4)
CHLORIDE SERPL-SCNC: 99 MMOL/L (ref 98–107)
CO2 SERPL-SCNC: 23 MMOL/L (ref 20–31)
CREAT SERPL-MCNC: 0.8 MG/DL (ref 0.7–1.2)
ERYTHROCYTE [DISTWIDTH] IN BLOOD BY AUTOMATED COUNT: 15.8 % (ref 11.5–14.9)
GFR SERPL CREATININE-BSD FRML MDRD: >60 ML/MIN/1.73M2
GLUCOSE SERPL-MCNC: 94 MG/DL (ref 70–99)
HCT VFR BLD AUTO: 30.2 % (ref 41–53)
HGB BLD-MCNC: 10.5 G/DL (ref 13.5–17.5)
INR PPP: 1.4
MAGNESIUM SERPL-MCNC: 1.9 MG/DL (ref 1.6–2.6)
MCH RBC QN AUTO: 32.3 PG (ref 26–34)
MCHC RBC AUTO-ENTMCNC: 34.6 G/DL (ref 31–37)
MCV RBC AUTO: 93.2 FL (ref 80–100)
PLATELET # BLD AUTO: 63 K/UL (ref 150–450)
PMV BLD AUTO: 7.2 FL (ref 6–12)
POTASSIUM SERPL-SCNC: 3.4 MMOL/L (ref 3.7–5.3)
PROTHROMBIN TIME: 17.1 SEC (ref 11.8–14.6)
RBC # BLD AUTO: 3.24 M/UL (ref 4.5–5.9)
SODIUM SERPL-SCNC: 132 MMOL/L (ref 135–144)
WBC OTHER # BLD: 6.2 K/UL (ref 3.5–11)

## 2023-08-02 PROCEDURE — 87522 HEPATITIS C REVRS TRNSCRPJ: CPT

## 2023-08-02 PROCEDURE — 7100000000 HC PACU RECOVERY - FIRST 15 MIN: Performed by: INTERNAL MEDICINE

## 2023-08-02 PROCEDURE — 6360000002 HC RX W HCPCS: Performed by: INTERNAL MEDICINE

## 2023-08-02 PROCEDURE — 6360000002 HC RX W HCPCS: Performed by: NURSE PRACTITIONER

## 2023-08-02 PROCEDURE — 6370000000 HC RX 637 (ALT 250 FOR IP): Performed by: INTERNAL MEDICINE

## 2023-08-02 PROCEDURE — 2060000000 HC ICU INTERMEDIATE R&B

## 2023-08-02 PROCEDURE — 43255 EGD CONTROL BLEEDING ANY: CPT | Performed by: INTERNAL MEDICINE

## 2023-08-02 PROCEDURE — 6360000002 HC RX W HCPCS: Performed by: NURSE ANESTHETIST, CERTIFIED REGISTERED

## 2023-08-02 PROCEDURE — 2580000003 HC RX 258: Performed by: NURSE PRACTITIONER

## 2023-08-02 PROCEDURE — 85027 COMPLETE CBC AUTOMATED: CPT

## 2023-08-02 PROCEDURE — 7100000001 HC PACU RECOVERY - ADDTL 15 MIN: Performed by: INTERNAL MEDICINE

## 2023-08-02 PROCEDURE — 3700000000 HC ANESTHESIA ATTENDED CARE: Performed by: INTERNAL MEDICINE

## 2023-08-02 PROCEDURE — 80048 BASIC METABOLIC PNL TOTAL CA: CPT

## 2023-08-02 PROCEDURE — 2500000003 HC RX 250 WO HCPCS: Performed by: NURSE ANESTHETIST, CERTIFIED REGISTERED

## 2023-08-02 PROCEDURE — A4216 STERILE WATER/SALINE, 10 ML: HCPCS | Performed by: NURSE PRACTITIONER

## 2023-08-02 PROCEDURE — 2580000003 HC RX 258: Performed by: INTERNAL MEDICINE

## 2023-08-02 PROCEDURE — 36415 COLL VENOUS BLD VENIPUNCTURE: CPT

## 2023-08-02 PROCEDURE — C9113 INJ PANTOPRAZOLE SODIUM, VIA: HCPCS | Performed by: NURSE PRACTITIONER

## 2023-08-02 PROCEDURE — 3700000001 HC ADD 15 MINUTES (ANESTHESIA): Performed by: INTERNAL MEDICINE

## 2023-08-02 PROCEDURE — 2709999900 HC NON-CHARGEABLE SUPPLY: Performed by: INTERNAL MEDICINE

## 2023-08-02 PROCEDURE — C1889 IMPLANT/INSERT DEVICE, NOC: HCPCS | Performed by: INTERNAL MEDICINE

## 2023-08-02 PROCEDURE — 99223 1ST HOSP IP/OBS HIGH 75: CPT | Performed by: INTERNAL MEDICINE

## 2023-08-02 PROCEDURE — 3609013000 HC EGD TRANSORAL CONTROL BLEEDING ANY METHOD: Performed by: INTERNAL MEDICINE

## 2023-08-02 PROCEDURE — 83735 ASSAY OF MAGNESIUM: CPT

## 2023-08-02 PROCEDURE — 2720000010 HC SURG SUPPLY STERILE: Performed by: INTERNAL MEDICINE

## 2023-08-02 PROCEDURE — 85610 PROTHROMBIN TIME: CPT

## 2023-08-02 PROCEDURE — 6360000002 HC RX W HCPCS

## 2023-08-02 PROCEDURE — 0W3P8ZZ CONTROL BLEEDING IN GASTROINTESTINAL TRACT, VIA NATURAL OR ARTIFICIAL OPENING ENDOSCOPIC: ICD-10-PCS | Performed by: INTERNAL MEDICINE

## 2023-08-02 PROCEDURE — 99212 OFFICE O/P EST SF 10 MIN: CPT

## 2023-08-02 RX ORDER — LIDOCAINE HYDROCHLORIDE 20 MG/ML
INJECTION, SOLUTION EPIDURAL; INFILTRATION; INTRACAUDAL; PERINEURAL PRN
Status: DISCONTINUED | OUTPATIENT
Start: 2023-08-02 | End: 2023-08-02 | Stop reason: SDUPTHER

## 2023-08-02 RX ORDER — PROPOFOL 10 MG/ML
INJECTION, EMULSION INTRAVENOUS PRN
Status: DISCONTINUED | OUTPATIENT
Start: 2023-08-02 | End: 2023-08-02 | Stop reason: SDUPTHER

## 2023-08-02 RX ADMIN — LORAZEPAM 1 MG: 1 TABLET ORAL at 15:14

## 2023-08-02 RX ADMIN — PROPOFOL 20 MG: 10 INJECTION, EMULSION INTRAVENOUS at 10:21

## 2023-08-02 RX ADMIN — MULTIPLE VITAMINS W/ MINERALS TAB 1 TABLET: TAB at 14:01

## 2023-08-02 RX ADMIN — PROPOFOL 20 MG: 10 INJECTION, EMULSION INTRAVENOUS at 10:39

## 2023-08-02 RX ADMIN — Medication 2 MG: at 02:58

## 2023-08-02 RX ADMIN — RIFAXIMIN 400 MG: 200 TABLET ORAL at 14:01

## 2023-08-02 RX ADMIN — PROPOFOL 20 MG: 10 INJECTION, EMULSION INTRAVENOUS at 10:41

## 2023-08-02 RX ADMIN — PROPOFOL 20 MG: 10 INJECTION, EMULSION INTRAVENOUS at 10:33

## 2023-08-02 RX ADMIN — LORAZEPAM 2 MG: 1 TABLET ORAL at 21:04

## 2023-08-02 RX ADMIN — PROPOFOL 20 MG: 10 INJECTION, EMULSION INTRAVENOUS at 10:25

## 2023-08-02 RX ADMIN — PROPOFOL 20 MG: 10 INJECTION, EMULSION INTRAVENOUS at 10:23

## 2023-08-02 RX ADMIN — LIDOCAINE HYDROCHLORIDE 100 MG: 20 INJECTION, SOLUTION EPIDURAL; INFILTRATION; INTRACAUDAL; PERINEURAL at 10:19

## 2023-08-02 RX ADMIN — ACETAMINOPHEN 650 MG: 325 TABLET ORAL at 21:04

## 2023-08-02 RX ADMIN — FERROUS SULFATE TAB 325 MG (65 MG ELEMENTAL FE) 325 MG: 325 (65 FE) TAB at 14:01

## 2023-08-02 RX ADMIN — POTASSIUM CHLORIDE 40 MEQ: 1500 TABLET, EXTENDED RELEASE ORAL at 21:04

## 2023-08-02 RX ADMIN — THIAMINE HCL TAB 100 MG 100 MG: 100 TAB at 14:01

## 2023-08-02 RX ADMIN — PROPOFOL 20 MG: 10 INJECTION, EMULSION INTRAVENOUS at 10:37

## 2023-08-02 RX ADMIN — FERROUS SULFATE TAB 325 MG (65 MG ELEMENTAL FE) 325 MG: 325 (65 FE) TAB at 21:04

## 2023-08-02 RX ADMIN — PROPOFOL 20 MG: 10 INJECTION, EMULSION INTRAVENOUS at 10:27

## 2023-08-02 RX ADMIN — Medication 200 MG: at 14:01

## 2023-08-02 RX ADMIN — SODIUM CHLORIDE: 9 INJECTION, SOLUTION INTRAVENOUS at 06:22

## 2023-08-02 RX ADMIN — ONDANSETRON 4 MG: 4 TABLET, ORALLY DISINTEGRATING ORAL at 15:15

## 2023-08-02 RX ADMIN — PROPOFOL 20 MG: 10 INJECTION, EMULSION INTRAVENOUS at 10:35

## 2023-08-02 RX ADMIN — PROPOFOL 20 MG: 10 INJECTION, EMULSION INTRAVENOUS at 10:43

## 2023-08-02 RX ADMIN — PROPOFOL 20 MG: 10 INJECTION, EMULSION INTRAVENOUS at 10:45

## 2023-08-02 RX ADMIN — PROPOFOL 20 MG: 10 INJECTION, EMULSION INTRAVENOUS at 10:31

## 2023-08-02 RX ADMIN — PROPOFOL 100 MG: 10 INJECTION, EMULSION INTRAVENOUS at 10:19

## 2023-08-02 RX ADMIN — PANTOPRAZOLE SODIUM 40 MG: 40 INJECTION, POWDER, LYOPHILIZED, FOR SOLUTION INTRAVENOUS at 00:03

## 2023-08-02 RX ADMIN — OCTREOTIDE ACETATE 50 MCG/HR: 500 INJECTION, SOLUTION INTRAVENOUS; SUBCUTANEOUS at 14:02

## 2023-08-02 RX ADMIN — SODIUM CHLORIDE: 9 INJECTION, SOLUTION INTRAVENOUS at 14:02

## 2023-08-02 RX ADMIN — PROPOFOL 20 MG: 10 INJECTION, EMULSION INTRAVENOUS at 10:29

## 2023-08-02 RX ADMIN — PROCHLORPERAZINE EDISYLATE 10 MG: 5 INJECTION INTRAMUSCULAR; INTRAVENOUS at 21:09

## 2023-08-02 RX ADMIN — PROCHLORPERAZINE EDISYLATE 10 MG: 5 INJECTION INTRAMUSCULAR; INTRAVENOUS at 00:05

## 2023-08-02 RX ADMIN — SPIRONOLACTONE 50 MG: 25 TABLET ORAL at 14:01

## 2023-08-02 ASSESSMENT — PAIN - FUNCTIONAL ASSESSMENT: PAIN_FUNCTIONAL_ASSESSMENT: 0-10

## 2023-08-02 ASSESSMENT — PAIN DESCRIPTION - DESCRIPTORS: DESCRIPTORS: ACHING

## 2023-08-02 ASSESSMENT — LIFESTYLE VARIABLES: SMOKING_STATUS: 1

## 2023-08-02 NOTE — PROGRESS NOTES
Patient called out and states that he is nauseated and anxious. RN assessed with the CIWA scale. Patient scored an 8 - 1mg of oral Ativan to be given and zofran.

## 2023-08-02 NOTE — OP NOTE
EGD report    Esophagogastroduodenoscopy (EGD) Procedure Note    Procedure:  EGD with clip placement and APC ablation    Indications:  Coffee ground emesis, melena  Patient with history of alcoholic liver cirrhosis, previous varices, ascites s/p TIPS    Sedation:  MAC    Attending Physician:  Dr. Carolee Lombardi MD    Assistant:  None    Procedure Details:    Informed consent was obtained for the procedure, including sedation. Risks of infection, perforation, hemorrhage, adverse drug reaction, and aspiration were discussed. The patient was placed in the left lateral decubitus position. The patient was monitored continuously with ECG tracing, pulse oximetry, blood pressure monitoring, and direct observation. The gastroscope was inserted into the mouth and advanced under direct vision to second portion of the duodenum. A careful inspection was made as the gastroscope was withdrawn, including a retroflexed view of the proximal stomach; findings and interventions are described below. Appropriate photodocumentation was obtained. Findings: The esophagus was noted to have at least 2 columns of large esophageal varices that flattened on insufflation. No high risk features noted. There was evidence of previously placed clip right below the GE junction. Mild mucosal oozing of blood was noted around the site. 2 clips were attempted, 1 was unsuccessful. The second was placed adjacent to the first clip to try to stop the feeding vessel underneath and was successful. The area was irrigated with water and no further bleeding was observed. Unclear if this was an AVM vs varix underneath. Mild-moderate portal hypertensive gastropathy noted. The duodenal bulb had 1 small AVM that was ablated using APC. The first and second portion of duodenum were normal.      Complications:  None           Estimated blood loss:  Minimal    Disposition:  [facility].            Condition: stable    Impression:    Large esophageal varix

## 2023-08-02 NOTE — PROGRESS NOTES
RN perfect served GI NP to verify that patient can have clear liquid diet, okay was given. Order placed.

## 2023-08-02 NOTE — PLAN OF CARE
Case d/w dr Montey Homans will plan egd today, sandostatin and stat inr ordered . Gonzales Huertae Eric Villalobos, MASSIMO - CNP

## 2023-08-02 NOTE — DISCHARGE INSTR - COC
Continuity of Care Form    Patient Name: Alber Sainz   :  2250  MRN:  [id number]    Admit date:  2023  Discharge date: 23    Code Status Order: Full Code   Advance Directives:   [address] Documentation       Date/Time Healthcare Directive Type of Healthcare Directive Copy in [address] Agent's Name Healthcare Agent's Phone Number    [phone] Yes, patient has an advance directive for healthcare treatment Durable power of  for health care;Living will Yes, copy in chart -- -- --            Admitting Physician:  Chin Crisostomo MD  PCP: Kristal Agustin    Discharging Nurse: [phone]  [facility] Unit/Room#: 2116/6-01  Discharging Unit Phone Number: Isidorpeggy Brown RN    Emergency Contact:   Extended Emergency Contact Information  Primary Emergency Contact: [address] Phone: [phone]  Work Phone: [phone]  Mobile Phone: [phone]  Relation: Other    Past Surgical History:  Past Surgical History:   Procedure Laterality Date    BUNIONECTOMY      twice on right side    BUNIONECTOMY Left     CARPAL TUNNEL RELEASE Right     COLONOSCOPY      at age 35    COLONOSCOPY  10/05/2016    polyps-pathology tubular adenoma, and abnormal looking mucosa right colon-pathology-tubular adenoma    COLONOSCOPY N/A 2018    COLONOSCOPY POLYPECTOMY COLD BIOPSY performed by Lolis Smith MD at [address] [place] [address]  2018    Small polyp in the sigmoid colon and excised with biopsy forceps--tubular adenoma    COLONOSCOPY N/A 2022    COLONOSCOPY POLYPECTOMY performed by Lolis Smith MD at [address] [place] [address] COLON, DIAGNOSTIC      EGD    ESOPHAGOGASTRODUODENOSCOPY  2022    ESOPHAGOGASTRODUODENOSCOPY N/A 2023    IR PORT PLACEMENT EQUAL OR GREATER THAN 5 YEARS  2021    IR PORT PLACEMENT EQUAL OR GREATER THAN 5 YEARS 2021 [facility] PROCEDURES    IR TIPS INSERTION  2022    IR

## 2023-08-02 NOTE — CONSULTS
GI ATTENDING  Gastroenterology  Consultation Note     . REASON FOR CONSULTATION:  Coffee ground emesis    HISTORY OF PRESENT ILLNESS:       Mr. Esther Zaman is a 59-year-old male with past medical history of alcoholic liver cirrhosis with ascites, esophageal adenocarcinoma s/p hemoradiation, history of variceal bleed and refractory ascites s/p TIPS procedure, hx of bowel perforation s/p exploratory laparotomy with right hemicolectomy and end ileostomy (3/2023) done previously. Patient was admitted to the hospital with fatigue and dizziness. He was also noted to have cellulitis around stoma for which he is admitted. He is having significant output from the stomach and has excoriations and skin breakdown. Continues to drink alcohol. Was noted to be in alcohol withdrawal and CIWA protocol was initiated. The patient had multiple episodes of coffee-ground emesis overnight with GI was consulted. Hemoglobin appears to be 10.5. On admission hb was 12.9. Previous GI history:  EGD 3/17/23:  Portal hypertensive gastropathy  Questionable varix extending from the GE junction down to stomach (GOV-2?), bleeding, clips placed and bleeding controlled. EGD 2/13/23:  Telangectasia like lesions in the lower esophagus s/p APC ablation. Possible small varix in stomach.     PAST MEDICAL HISTORY:  Past Medical History:   Diagnosis Date    Abnormal EKG     Acute deep vein thrombosis (DVT) of brachial vein of right upper extremity ([address]) 01/07/2023    Also- Superficial venous thrombosis of the cephalic vein in the forearm    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)     Alcoholic cirrhosis of liver with ascites ([address])     Anemia 04/13/2022    Anxiety     Arthritis     Back pain, chronic     dr. Cece Rm, orthopedic, every 3-4 months, gets steroid injection    Lezama esophagus     Bleeding gastric varices 12/29/2022    BPH (benign prostatic hypertrophy)     Cholelithiasis     Cirrhosis ([address])     COVID-19 12/2020    pt reports he had a

## 2023-08-02 NOTE — PROGRESS NOTES
[facility]  Progress Note      NAME:  Mariposa Seymour  MEDICAL RECORD NUMBER:  [id number]  AGE: 60 y.o. GENDER: male  : 1960  TODAY'S DATE:  2023    [facility] nurse follow up visit for ostomy care. Pt had EGD this morning, so returned this afternoon to assess pouch adherence and peristomal skin. Skin looks much better today. Pt still has some redness, but most of the skin is dry and no longer weeping. Old pouch beginning to leak at the 3 o'clock position near the umbilicus. Pouch removed and area cleansed with warm washcloth. Pt still has some slight denudation and weepiness immediately distal to the stoma. Stoma bleeding lightly and stopped without intervention. Crusting technique was used to cover denuded skin. Strip paste placed in umbilicus and protective seal placed around stoma. New 2 piece barrier cut to fit and applied on top. Adhesives gently kneaded together until barrier adhering well, then pouch applied. Pt more alert today and states that his skin is starting to feel better. Encouraged him to notify staff if he has leakage so that pouch can be changed promptly. Will continue to follow.      Thai Singh, BSN, [address] [facility], [address] [place] [address]  Wound, Ostomy, and Continence Nursing  [phone]

## 2023-08-02 NOTE — PROGRESS NOTES
Pt having moderate amount of coffee ground emesis x2 since start of shift. Perfect serve sent to ADA Pope NP. Orders received for compazine Q6 PRN 10mg IV, Protonix 40mg IV Q 12, hold Lovenox, GI consult. Will continue to monitor.

## 2023-08-02 NOTE — PROGRESS NOTES
Port-A-Cath in place     right upper chest    Portal hypertension ([address])     Sciatica     Secondary esophageal varices ([address]) 06/07/2022    Shortness of breath     Spinal stenosis     Stomach ulcer     hx of    Thrombocytopenia ([address]) 12/23/2020    Tubular adenoma of colon 2016, 2018    Vitamin D deficiency     Wears glasses         Past Surgical History:     Past Surgical History:   Procedure Laterality Date    BUNIONECTOMY      twice on right side    BUNIONECTOMY Left     CARPAL TUNNEL RELEASE Right     COLONOSCOPY      at age 35    COLONOSCOPY  10/05/2016    polyps-pathology tubular adenoma, and abnormal looking mucosa right colon-pathology-tubular adenoma    COLONOSCOPY N/A 03/30/2018    COLONOSCOPY POLYPECTOMY COLD BIOPSY performed by Lilibeth Ferguson MD at [address]  03/30/2018    Small polyp in the sigmoid colon and excised with biopsy forceps--tubular adenoma    COLONOSCOPY N/A 04/16/2022    COLONOSCOPY POLYPECTOMY performed by Lilibeth Ferguson MD at [address], COLON, DIAGNOSTIC      EGD    ESOPHAGOGASTRODUODENOSCOPY  12/29/2022    ESOPHAGOGASTRODUODENOSCOPY N/A 01/03/2023    IR PORT PLACEMENT EQUAL OR GREATER THAN 5 YEARS  04/19/2021    IR PORT PLACEMENT EQUAL OR GREATER THAN 5 YEARS 4/19/2021 [facility] PROCED[facility]    IR TIPS INSERTION  12/01/2022    IR TIPS INSERTION 12/1/2022 Joe Barlow MD [facility] PROCEDURES    KNEE SURGERY Left     cyst removed    LAPAROTOMY N/A 3/25/2023    LAPAROTOMY EXPLORATORY, RIGHT COLECTOMY, CREATION IF END ILEOSTOMY performed by Jennifer Garcia DO at [address] Right 11/23/2020    NERVE BLOCK RIGHT CERVICAL STEROID INJECTION  C3-C6 performed by Kamron Uribe MD at [address]  01/04/2016    steroid injection C7 T1    OTHER SURGICAL HISTORY  11/21/2016    Bilateral Lumbar CACHORRO L4-L5 injections    OTHER SURGICAL HISTORY  12/19/2016    lumbar steroid injection    OTHER Lasix 40 twice a day and Aldactone 50 once a day admitted   patient walks with a walker lives alone with his dog    Patient admitted with cellulitis around the stoma likely secondary to excessive stoma output  We will discontinue lactulose we will start rifaximin 550 mg twice a day  Case discussed with stoma nurse bedside  IV Rocephin for cellulitis  Multiple hospitalization multiple comorbid condition high risk of mortality and morbidity  Anemia secondary to chronic liver disease  Hyponatremia mild sodium 132 due to liver disease and diuretic hypokalemia replace 40 mEq oral likely secondary diuretics on Aldactone also  Plan for EGD  Sandostatin         Consultations:   IP CONSULT TO GENERAL SURGERY  IP CONSULT TO INTERNAL MEDICINE  IP CONSULT TO SOCIAL WORK  IP CONSULT TO DIETITIAN  IP CONSULT TO GI     Patient is admitted as inpatient status because of co-morbidities listed above, severity of signs and symptoms as outlined, requirement for current medical therapies and most importantly because of direct risk to patient if care not provided in a hospital setting. Expected length of stay > 48 hours. Yolande Lubin MD  8/2/2023  9:29 AM    Copy sent to VASU Finch    Please note that this chart was generated using voice recognition Dragon dictation software. Although every effort was made to ensure the accuracy of this automated transcription, some errors in transcription may have occurred.

## 2023-08-02 NOTE — PROGRESS NOTES
Pt has another moderate amount of coffee ground emesis, x3 since start of shift. Dr. Verdin Home given update and notified of new GI consult. Writer updated Dr. Verdin Home of morning Hgb 10.8 and current vitals.  Order received to make Pt NPO and continue Protonix IV Q12 and compazine Q 6 PRN

## 2023-08-02 NOTE — CARE COORDINATION
ONGOING DISCHARGE PLAN:    Patient is alert and oriented x4. Spoke with patient regarding discharge plan and patient confirms that plan is still home. States current with [facility]. Referral sent. POD #0 EGD-large varices  x2, previous clip in place, another clip placed, mild-moderate portal HTN gastropathy; small AVM in duodenum     Clear liquid diet, octreotide drip, IV protonix BID    Liver US    IV rocephin-cellulitis around stoma    Wound care for ostomy care    Will continue to follow for additional discharge needs. If patient is discharged prior to next notation, then this note serves as note for discharge by case management.     Electronically signed by Hailey Diaz RN on 8/2/2023 at 3:38 PM

## 2023-08-02 NOTE — PLAN OF CARE
Problem: Safety - Adult  Goal: Free from fall injury  8/2/2023 1654 by Asa Brandon RN  Outcome: Progressing     Problem: Pain  Goal: Verbalizes/displays adequate comfort level or baseline comfort level  8/2/2023 1654 by Asa Brandon RN  Outcome: Progressing     Problem: Nutrition Deficit:  Goal: Optimize nutritional status  8/2/2023 1654 by Asa Brandon RN  Outcome: Progressing     Problem: ABCDS Injury Assessment  Goal: Absence of physical injury  Outcome: Progressing

## 2023-08-02 NOTE — PROGRESS NOTES
Physician Progress Note      Obed Lopez  [facility] #:                  [id number]  :                       1960  ADMIT DATE:       2023 3:17 PM  [facility] DATE:  RESPONDING  PROVIDER #:        Albert Garcia MD          QUERY TEXT:    Patient admitted with cellulitis of abdominal wall. Noted to have poor   appetite, unplanned weight loss, and dietician assessment with moderate   malnutrition diagnosis. If possible, please document in progress notes and   discharge summary if you are evaluating and /or treating any of the following: The medical record reflects the following:    Risk Factors: poor appetite, unplanned weight loss  Clinical Indicators: moderate malnutrition per dietician per AND/ASPEN   guidelines as evidenced by Energy Intake:  75% or less estimated energy   requirements for 1 month or longer,  10% weight loss over 6 months  Treatment: Dietician consult, Ensure Enlive x2, and Ensure clear x1 daily    ASPEN Criteria:    https://aspenjournals. onlinelibrary. strong. com/doi/full/10.1177/798604379413091  5    Thank you! Maxi Davalos CRCR  RN Clinical   Options provided:  -- Moderate protein calorie malnutrition  -- Other - I will add my own diagnosis  -- Disagree - Not applicable / Not valid  -- Disagree - Clinically unable to determine / Unknown  -- Refer to Clinical Documentation Reviewer    PROVIDER RESPONSE TEXT:    This patient has moderate protein calorie malnutrition.     Query created by: Courtney Srivastava on 2023 2:21 PM      Electronically signed by:  Albert Garcia MD 2023 5:08 PM

## 2023-08-03 ENCOUNTER — APPOINTMENT (OUTPATIENT)
Dept: ULTRASOUND IMAGING | Age: 63
DRG: 602 | End: 2023-08-03
Payer: COMMERCIAL

## 2023-08-03 LAB
ANION GAP SERPL CALCULATED.3IONS-SCNC: 8 MMOL/L (ref 9–17)
BUN SERPL-MCNC: 15 MG/DL (ref 8–23)
CALCIUM SERPL-MCNC: 8.3 MG/DL (ref 8.6–10.4)
CHLORIDE SERPL-SCNC: 101 MMOL/L (ref 98–107)
CO2 SERPL-SCNC: 23 MMOL/L (ref 20–31)
CREAT SERPL-MCNC: 0.9 MG/DL (ref 0.7–1.2)
ERYTHROCYTE [DISTWIDTH] IN BLOOD BY AUTOMATED COUNT: 16.1 % (ref 11.5–14.9)
GFR SERPL CREATININE-BSD FRML MDRD: >60 ML/MIN/1.73M2
GLUCOSE SERPL-MCNC: 125 MG/DL (ref 70–99)
HCT VFR BLD AUTO: 29.4 % (ref 41–53)
HGB BLD-MCNC: 10 G/DL (ref 13.5–17.5)
MCH RBC QN AUTO: 32.2 PG (ref 26–34)
MCHC RBC AUTO-ENTMCNC: 34.1 G/DL (ref 31–37)
MCV RBC AUTO: 94.6 FL (ref 80–100)
MICROORGANISM SPEC CULT: ABNORMAL
PLATELET # BLD AUTO: 67 K/UL (ref 150–450)
PMV BLD AUTO: 7.6 FL (ref 6–12)
POTASSIUM SERPL-SCNC: 3.8 MMOL/L (ref 3.7–5.3)
RBC # BLD AUTO: 3.11 M/UL (ref 4.5–5.9)
SERVICE CMNT-IMP: ABNORMAL
SODIUM SERPL-SCNC: 132 MMOL/L (ref 135–144)
SPECIMEN DESCRIPTION: ABNORMAL
WBC OTHER # BLD: 5.3 K/UL (ref 3.5–11)

## 2023-08-03 PROCEDURE — 6370000000 HC RX 637 (ALT 250 FOR IP): Performed by: NURSE PRACTITIONER

## 2023-08-03 PROCEDURE — 2580000003 HC RX 258: Performed by: INTERNAL MEDICINE

## 2023-08-03 PROCEDURE — 6360000002 HC RX W HCPCS: Performed by: INTERNAL MEDICINE

## 2023-08-03 PROCEDURE — 6370000000 HC RX 637 (ALT 250 FOR IP): Performed by: INTERNAL MEDICINE

## 2023-08-03 PROCEDURE — 80048 BASIC METABOLIC PNL TOTAL CA: CPT

## 2023-08-03 PROCEDURE — 76705 ECHO EXAM OF ABDOMEN: CPT

## 2023-08-03 PROCEDURE — 2060000000 HC ICU INTERMEDIATE R&B

## 2023-08-03 PROCEDURE — C9113 INJ PANTOPRAZOLE SODIUM, VIA: HCPCS | Performed by: INTERNAL MEDICINE

## 2023-08-03 PROCEDURE — A4216 STERILE WATER/SALINE, 10 ML: HCPCS | Performed by: INTERNAL MEDICINE

## 2023-08-03 PROCEDURE — 36415 COLL VENOUS BLD VENIPUNCTURE: CPT

## 2023-08-03 PROCEDURE — 85027 COMPLETE CBC AUTOMATED: CPT

## 2023-08-03 PROCEDURE — 99233 SBSQ HOSP IP/OBS HIGH 50: CPT | Performed by: INTERNAL MEDICINE

## 2023-08-03 PROCEDURE — APPSS30 APP SPLIT SHARED TIME 16-30 MINUTES: Performed by: NURSE PRACTITIONER

## 2023-08-03 RX ORDER — HYDROCODONE BITARTRATE AND ACETAMINOPHEN 5; 325 MG/1; MG/1
1 TABLET ORAL EVERY 6 HOURS PRN
Status: DISCONTINUED | OUTPATIENT
Start: 2023-08-03 | End: 2023-08-05 | Stop reason: HOSPADM

## 2023-08-03 RX ORDER — NADOLOL 20 MG/1
20 TABLET ORAL DAILY
Status: DISCONTINUED | OUTPATIENT
Start: 2023-08-03 | End: 2023-08-05 | Stop reason: HOSPADM

## 2023-08-03 RX ADMIN — RIFAXIMIN 400 MG: 200 TABLET ORAL at 00:27

## 2023-08-03 RX ADMIN — OCTREOTIDE ACETATE 50 MCG/HR: 500 INJECTION, SOLUTION INTRAVENOUS; SUBCUTANEOUS at 00:24

## 2023-08-03 RX ADMIN — SODIUM CHLORIDE: 9 INJECTION, SOLUTION INTRAVENOUS at 00:22

## 2023-08-03 RX ADMIN — PANTOPRAZOLE SODIUM 40 MG: 40 INJECTION, POWDER, LYOPHILIZED, FOR SOLUTION INTRAVENOUS at 11:07

## 2023-08-03 RX ADMIN — RIFAXIMIN 400 MG: 200 TABLET ORAL at 21:45

## 2023-08-03 RX ADMIN — CEFTRIAXONE SODIUM 1000 MG: 1 INJECTION, POWDER, FOR SOLUTION INTRAMUSCULAR; INTRAVENOUS at 21:57

## 2023-08-03 RX ADMIN — FERROUS SULFATE TAB 325 MG (65 MG ELEMENTAL FE) 325 MG: 325 (65 FE) TAB at 11:06

## 2023-08-03 RX ADMIN — PANTOPRAZOLE SODIUM 40 MG: 40 INJECTION, POWDER, LYOPHILIZED, FOR SOLUTION INTRAVENOUS at 00:27

## 2023-08-03 RX ADMIN — ONDANSETRON 4 MG: 4 TABLET, ORALLY DISINTEGRATING ORAL at 14:31

## 2023-08-03 RX ADMIN — FERROUS SULFATE TAB 325 MG (65 MG ELEMENTAL FE) 325 MG: 325 (65 FE) TAB at 21:44

## 2023-08-03 RX ADMIN — RIFAXIMIN 400 MG: 200 TABLET ORAL at 11:07

## 2023-08-03 RX ADMIN — RIFAXIMIN 400 MG: 200 TABLET ORAL at 18:42

## 2023-08-03 RX ADMIN — SODIUM CHLORIDE: 9 INJECTION, SOLUTION INTRAVENOUS at 11:30

## 2023-08-03 RX ADMIN — SODIUM CHLORIDE, PRESERVATIVE FREE 10 ML: 5 INJECTION INTRAVENOUS at 11:07

## 2023-08-03 RX ADMIN — Medication 5 ML: at 11:09

## 2023-08-03 RX ADMIN — PANTOPRAZOLE SODIUM 40 MG: 40 INJECTION, POWDER, LYOPHILIZED, FOR SOLUTION INTRAVENOUS at 21:44

## 2023-08-03 RX ADMIN — SODIUM CHLORIDE: 9 INJECTION, SOLUTION INTRAVENOUS at 19:05

## 2023-08-03 RX ADMIN — Medication 200 MG: at 11:06

## 2023-08-03 RX ADMIN — MULTIPLE VITAMINS W/ MINERALS TAB 1 TABLET: TAB at 11:06

## 2023-08-03 RX ADMIN — OCTREOTIDE ACETATE 50 MCG/HR: 500 INJECTION, SOLUTION INTRAVENOUS; SUBCUTANEOUS at 11:30

## 2023-08-03 RX ADMIN — SODIUM CHLORIDE, PRESERVATIVE FREE 10 ML: 5 INJECTION INTRAVENOUS at 22:00

## 2023-08-03 RX ADMIN — CEFTRIAXONE SODIUM 1000 MG: 1 INJECTION, POWDER, FOR SOLUTION INTRAMUSCULAR; INTRAVENOUS at 00:23

## 2023-08-03 RX ADMIN — SPIRONOLACTONE 50 MG: 25 TABLET ORAL at 11:06

## 2023-08-03 RX ADMIN — OCTREOTIDE ACETATE 50 MCG/HR: 500 INJECTION, SOLUTION INTRAVENOUS; SUBCUTANEOUS at 22:50

## 2023-08-03 RX ADMIN — NADOLOL 20 MG: 20 TABLET ORAL at 14:17

## 2023-08-03 RX ADMIN — HYDROCODONE BITARTRATE AND ACETAMINOPHEN 1 TABLET: 5; 325 TABLET ORAL at 14:17

## 2023-08-03 RX ADMIN — HYDROCODONE BITARTRATE AND ACETAMINOPHEN 1 TABLET: 5; 325 TABLET ORAL at 21:44

## 2023-08-03 RX ADMIN — THIAMINE HCL TAB 100 MG 100 MG: 100 TAB at 11:06

## 2023-08-03 ASSESSMENT — PAIN DESCRIPTION - LOCATION
LOCATION: ABDOMEN;HEAD
LOCATION: ABDOMEN;HEAD
LOCATION: ABDOMEN

## 2023-08-03 ASSESSMENT — PAIN DESCRIPTION - ORIENTATION
ORIENTATION: LOWER;MID
ORIENTATION: LOWER

## 2023-08-03 ASSESSMENT — PAIN DESCRIPTION - DESCRIPTORS
DESCRIPTORS: ACHING
DESCRIPTORS: DISCOMFORT
DESCRIPTORS: ACHING

## 2023-08-03 ASSESSMENT — PAIN SCALES - GENERAL
PAINLEVEL_OUTOF10: 8

## 2023-08-03 NOTE — PLAN OF CARE
Problem: Discharge Planning  Goal: Discharge to home or other facility with appropriate resources  8/2/2023 1654 by Houston Rea RN  Outcome: Progressing     Problem: Safety - Adult  Goal: Free from fall injury  8/2/2023 1654 by Houston Rea RN  Outcome: Progressing     Problem: Pain  Goal: Verbalizes/displays adequate comfort level or baseline comfort level  8/2/2023 1654 by Houston Rea RN  Outcome: Progressing     Problem: Nutrition Deficit:  Goal: Optimize nutritional status  8/2/2023 1654 by Houston Rea RN  Outcome: Progressing     Problem: ABCDS Injury Assessment  Goal: Absence of physical injury  8/2/2023 1654 by Houston Rea RN  Outcome: Progressing

## 2023-08-03 NOTE — PROGRESS NOTES
[address] [facility] Internal Medicine  Lit Farris MD; Matt Martinez MD; Troy Rios MD; Walt Nageotte, MD Jairo Lime, MD; MD CHANDNI Zimmer [facility] Internal Medicine   [address]    HISTORY AND PHYSICAL EXAMINATION            Date:   8/3/2023  Patient name:  Rahat Landin  Date of admission:  7/31/2023  3:17 PM  MRN:   [id number]  Account:  [de-identified]  YOB: 1960  PCP:    VASU Lyles  Room:   2116/2116-01  Code Status:    Full Code    Chief Complaint:     Chief Complaint   Patient presents with    Fatigue    Dizziness       History Obtained From:     Patient medical record nursing staff    History of Present Illness:     Rahat Landin is a 60 y.o. Non- / non  male who presents with Fatigue and Dizziness   and is admitted to the hospital for the management of Cellulitis. y.o. Non- / non  male who presents with Fatigue and Dizziness   and is admitted to the hospital for the management of Cellulitis. According to patient, he has been feeling weak and unwell for the past couple of days. Reports that he has been having difficulty getting a good seal on his ileostomy bag, resulting in leaking and severe skin excoriation. He was seen by the wound/ostomy nurse today, who transported him to the ED for evaluation of weakness, dizziness, and nausea, as well as concerns for having a high-output ileostomy. Symptoms are associated with poor po intake. Patient reports that he does not eat or drink as much as he should in an attempt to decrease the frequency that his ileostomy bag needs emptied or changed. Additionally, he has abdominal pain r/t denuded skin around ostomy site. Denies fever, chills, chest pain, cough, and urinary symptoms. There are no aggravating or alleviating factors. Symptoms are reported as constant and moderate to severe; ongoing for the past couple of days.

## 2023-08-03 NOTE — CARE COORDINATION
ONGOING DISCHARGE PLAN:    Patient is alert and oriented x4. Spoke with patient regarding discharge plan and patient confirms that plan is still home with [facility]. POD #1 EGD-large varices  x2, previous clip in place, another clip placed, mild-moderate portal HTN gastropathy; small AVM in duodenum     Advanced to low sodium diet  octreotide drip, IV protonix BID     Liver US-TIPS patent, cirrhosis  Started on Nadolol     IV rocephin-cellulitis around stoma     Wound care for ostomy care    Will continue to follow for additional discharge needs. If patient is discharged prior to next notation, then this note serves as note for discharge by case management.     Electronically signed by Carly Rajan RN on 8/3/2023 at 2:41 PM

## 2023-08-03 NOTE — PROGRESS NOTES
Comprehensive Nutrition Assessment    Type and Reason for Visit:  Reassess    Nutrition Recommendations/Plan:   Discontinued Easy to [employer] as pt has no difficulty chewing. Adding chocolate Ensure Enlive each meal.     Malnutrition Assessment:  Malnutrition Status: Moderate malnutrition (08/01/23 1604)    Context:  Chronic Illness (Acute on Chronic)     Findings of the 6 clinical characteristics of malnutrition:  Energy Intake:  75% or less estimated energy requirements for 1 month or longer  Weight Loss:  10% over 6 months     Body Fat Loss:  Unable to assess     Muscle Mass Loss:  Unable to assess    Fluid Accumulation:  No significant fluid accumulation     Strength:  Not Performed    Nutrition Assessment:    Nurse reports pt doesn't need an Easy to [employer] and he is very specific as to his food choices. Nutrition Related Findings:    No edema. Therapeutic multivitamin with minerals, Thiamine, MgOxide. Other meds and labs reviewed. 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 Ileostomy with reported high output. Wound Type:  (significant peristomal denudation)       Current Nutrition Intake & Therapies:    Average Meal Intake: 51-75%     ADULT DIET; Regular; Low Sodium (2 gm)  ADULT ORAL NUTRITION SUPPLEMENT; Breakfast, Lunch, Dinner; Standard High Calorie/High Protein Oral Supplement    Anthropometric Measures:  Height: 5' 10\" (177.8 cm)  Ideal Body Weight (IBW): 166 lbs (75 kg)    Admission Body Weight: 169 lb 15.6 oz (77.1 kg)  Current Body Weight: 169 lb 15.6 oz (77.1 kg), 102.4 % IBW. Weight Source: Stated  Current BMI (kg/m2): 24.4  Usual Body Weight: 192 lb 14.4 oz (87.5 kg) (1/27/23)  % Weight Change (Calculated): -11.9                    BMI Categories: Normal Weight (BMI 18.5-24. 9)    Estimated Daily Nutrient Needs:  Energy Requirements Based On: Formula     Energy (kcal/day): 1900 based on

## 2023-08-04 VITALS
RESPIRATION RATE: 18 BRPM | SYSTOLIC BLOOD PRESSURE: 144 MMHG | DIASTOLIC BLOOD PRESSURE: 61 MMHG | OXYGEN SATURATION: 100 % | WEIGHT: 176.59 LBS | TEMPERATURE: 98.2 F | HEIGHT: 70 IN | BODY MASS INDEX: 25.28 KG/M2 | HEART RATE: 64 BPM

## 2023-08-04 LAB
HCT VFR BLD AUTO: 29.3 % (ref 41–53)
HCV RNA # SERPL NAA+PROBE: NOT DETECTED {COPIES}/ML
HGB BLD-MCNC: 10 G/DL (ref 13.5–17.5)
SPECIMEN SOURCE: NORMAL

## 2023-08-04 PROCEDURE — C9113 INJ PANTOPRAZOLE SODIUM, VIA: HCPCS | Performed by: INTERNAL MEDICINE

## 2023-08-04 PROCEDURE — 6360000002 HC RX W HCPCS: Performed by: INTERNAL MEDICINE

## 2023-08-04 PROCEDURE — 6370000000 HC RX 637 (ALT 250 FOR IP): Performed by: INTERNAL MEDICINE

## 2023-08-04 PROCEDURE — 85014 HEMATOCRIT: CPT

## 2023-08-04 PROCEDURE — 99239 HOSP IP/OBS DSCHRG MGMT >30: CPT | Performed by: INTERNAL MEDICINE

## 2023-08-04 PROCEDURE — 36415 COLL VENOUS BLD VENIPUNCTURE: CPT

## 2023-08-04 PROCEDURE — 6370000000 HC RX 637 (ALT 250 FOR IP): Performed by: NURSE PRACTITIONER

## 2023-08-04 PROCEDURE — APPSS30 APP SPLIT SHARED TIME 16-30 MINUTES: Performed by: NURSE PRACTITIONER

## 2023-08-04 PROCEDURE — 2580000003 HC RX 258: Performed by: INTERNAL MEDICINE

## 2023-08-04 PROCEDURE — 85018 HEMOGLOBIN: CPT

## 2023-08-04 PROCEDURE — A4216 STERILE WATER/SALINE, 10 ML: HCPCS | Performed by: INTERNAL MEDICINE

## 2023-08-04 PROCEDURE — 99221 1ST HOSP IP/OBS SF/LOW 40: CPT

## 2023-08-04 RX ORDER — NADOLOL 20 MG/1
20 TABLET ORAL DAILY
Qty: 30 TABLET | Refills: 3 | Status: SHIPPED | OUTPATIENT
Start: 2023-08-05

## 2023-08-04 RX ORDER — PANTOPRAZOLE SODIUM 40 MG/1
40 TABLET, DELAYED RELEASE ORAL
Status: DISCONTINUED | OUTPATIENT
Start: 2023-08-05 | End: 2023-08-05 | Stop reason: HOSPADM

## 2023-08-04 RX ADMIN — FERROUS SULFATE TAB 325 MG (65 MG ELEMENTAL FE) 325 MG: 325 (65 FE) TAB at 09:04

## 2023-08-04 RX ADMIN — RIFAXIMIN 400 MG: 200 TABLET ORAL at 12:40

## 2023-08-04 RX ADMIN — ANTACID TABLETS 1000 MG: 500 TABLET, CHEWABLE ORAL at 18:08

## 2023-08-04 RX ADMIN — HYDROCODONE BITARTRATE AND ACETAMINOPHEN 1 TABLET: 5; 325 TABLET ORAL at 18:08

## 2023-08-04 RX ADMIN — SPIRONOLACTONE 50 MG: 25 TABLET ORAL at 09:04

## 2023-08-04 RX ADMIN — ONDANSETRON 4 MG: 2 INJECTION INTRAMUSCULAR; INTRAVENOUS at 09:10

## 2023-08-04 RX ADMIN — HYDROCODONE BITARTRATE AND ACETAMINOPHEN 1 TABLET: 5; 325 TABLET ORAL at 09:10

## 2023-08-04 RX ADMIN — PANTOPRAZOLE SODIUM 40 MG: 40 INJECTION, POWDER, LYOPHILIZED, FOR SOLUTION INTRAVENOUS at 12:40

## 2023-08-04 RX ADMIN — MULTIPLE VITAMINS W/ MINERALS TAB 1 TABLET: TAB at 09:04

## 2023-08-04 RX ADMIN — ONDANSETRON 4 MG: 4 TABLET, ORALLY DISINTEGRATING ORAL at 18:08

## 2023-08-04 RX ADMIN — ANTACID TABLETS 1000 MG: 500 TABLET, CHEWABLE ORAL at 09:10

## 2023-08-04 RX ADMIN — RIFAXIMIN 400 MG: 200 TABLET ORAL at 09:04

## 2023-08-04 RX ADMIN — Medication 200 MG: at 09:04

## 2023-08-04 RX ADMIN — NADOLOL 20 MG: 20 TABLET ORAL at 09:04

## 2023-08-04 RX ADMIN — THIAMINE HCL TAB 100 MG 100 MG: 100 TAB at 09:04

## 2023-08-04 ASSESSMENT — PAIN SCALES - GENERAL
PAINLEVEL_OUTOF10: 7
PAINLEVEL_OUTOF10: 8

## 2023-08-04 NOTE — DISCHARGE INSTR - DIET

## 2023-08-04 NOTE — PLAN OF CARE
Problem: Safety - Adult  Goal: Free from fall injury  Outcome: Progressing     Problem: Pain  Goal: Verbalizes/displays adequate comfort level or baseline comfort level  Outcome: Progressing     Problem: Nutrition Deficit:  Goal: Optimize nutritional status  Outcome: Progressing     Problem: Discharge Planning  Goal: Discharge to home or other facility with appropriate resources  Outcome: Progressing

## 2023-08-04 NOTE — PROGRESS NOTES
[facility] Internal Medicine  Calvin Zepeda MD; Alistair Murphy MD; Wilmar Zapien MD; MD Deejay Dover MD; MD CHANDNI Escobedo [facility]   [address]    HISTORY AND PHYSICAL EXAMINATION            Date:   8/4/2023  Patient name:  Delilah Tolbert  Date of admission:  7/31/2023  3:17 PM  MRN:   [id number]  Account:  [de-identified]  YOB: 1960  PCP:    VASU Barry  Room:   2116/2116-01  Code Status:    Full Code    Chief Complaint:     Chief Complaint   Patient presents with    Fatigue    Dizziness       History Obtained From:     Patient medical record nursing staff    History of Present Illness:     Delilah Tolbert is a 60 y.o. Non- / non  male who presents with Fatigue and Dizziness   and is admitted to the hospital for the management of Cellulitis. y.o. Non- / non  male who presents with Fatigue and Dizziness   and is admitted to the hospital for the management of Cellulitis. According to patient, he has been feeling weak and unwell for the past couple of days. Reports that he has been having difficulty getting a good seal on his ileostomy bag, resulting in leaking and severe skin excoriation. He was seen by the wound/ostomy nurse today, who transported him to the ED for evaluation of weakness, dizziness, and nausea, as well as concerns for having a high-output ileostomy. Symptoms are associated with poor po intake. Patient reports that he does not eat or drink as much as he should in an attempt to decrease the frequency that his ileostomy bag needs emptied or changed. Additionally, he has abdominal pain r/t denuded skin around ostomy site. Denies fever, chills, chest pain, cough, and urinary symptoms. There are no aggravating or alleviating factors. Symptoms are reported as constant and moderate to severe; ongoing for the past couple of days. disorder, bilateral 12/31/2022    biLateral mastoid effusion    Pericardial effusion     PONV (postoperative nausea and vomiting)     Poor venous access     has port in place.     Port-A-Cath in place     right upper chest    Portal hypertension ([address])     Sciatica     Secondary esophageal varices ([address]) 06/07/2022    Shortness of breath     Spinal stenosis     Stomach ulcer     hx of    Thrombocytopenia ([address]) 12/23/2020    Tubular adenoma of colon 2016, 2018    Vitamin D deficiency     Wears glasses         Past Surgical History:     Past Surgical History:   Procedure Laterality Date    BUNIONECTOMY      twice on right side    BUNIONECTOMY Left     CARPAL TUNNEL RELEASE Right     COLONOSCOPY      at age 35    COLONOSCOPY  10/05/2016    polyps-pathology tubular adenoma, and abnormal looking mucosa right colon-pathology-tubular adenoma    COLONOSCOPY N/A 03/30/2018    COLONOSCOPY POLYPECTOMY COLD BIOPSY performed by Jonatan Steen MD at [address]  03/30/2018    Small polyp in the sigmoid colon and excised with biopsy forceps--tubular adenoma    COLONOSCOPY N/A 04/16/2022    COLONOSCOPY POLYPECTOMY performed by Jonatan Steen MD at [address], COLON, DIAGNOSTIC      EGD    ESOPHAGOGASTRODUODENOSCOPY  12/29/2022    ESOPHAGOGASTRODUODENOSCOPY N/A 01/03/2023    IR PORT PLACEMENT EQUAL OR GREATER THAN 5 YEARS  04/19/2021    IR PORT PLACEMENT EQUAL OR GREATER THAN 5 YEARS 4/19/2021 [facility]    IR TIPS INSERTION  12/01/2022    IR TIPS INSERTION 12/1/2022 Reji Lopez MD [facility]    KNEE SURGERY Left     cyst removed    LAPAROTOMY N/A 3/25/2023    LAPAROTOMY EXPLORATORY, RIGHT COLECTOMY, CREATION IF END ILEOSTOMY performed by Carlton Ramos DO at [address] Right 11/23/2020    NERVE BLOCK RIGHT CERVICAL STEROID INJECTION  C3-C6 performed by Garima Duncan MD at [address]  01/04/2016

## 2023-08-04 NOTE — PROGRESS NOTES
Dr. Jung Russo discussed case with Dr. Young Grullon and they are both [facility] for him to be Dc'd.

## 2023-08-04 NOTE — CARE COORDINATION
ONGOING DISCHARGE PLAN:    Patient is alert and oriented x4. Spoke with patient regarding discharge plan and patient confirms that plan is still home with [facility]. Spoke to [place] with Shira Wiley, requested ostomy RN. POD #2 EGD-large varices  x2, previous clip in place, another clip placed, mild-moderate portal HTN gastropathy; small AVM in duodenum       octreotide drip d/c   IV protonix BID changed to oral        Regular diet    Liver US-TIPS patent, cirrhosis     Started on Nadolol      IV rocephin-cellulitis around stoma       Wound care for ostomy care       Will continue to follow for additional discharge needs. If patient is discharged prior to next notation, then this note serves as note for discharge by case management.     Electronically signed by Christean Saint, RN on 8/4/2023 at 4:33 PM

## 2023-08-04 NOTE — PLAN OF CARE
Problem: Discharge Planning  Goal: Discharge to home or other facility with appropriate resources  8/4/2023 1656 by Albin Hennessy RN  Outcome: Adequate for Discharge     Problem: Safety - Adult  Goal: Free from fall injury  8/4/2023 1656 by Albin Hennessy RN  Outcome: Adequate for Discharge     Problem: Pain  Goal: Verbalizes/displays adequate comfort level or baseline comfort level  8/4/2023 1656 by Albin Hennessy RN  Outcome: Adequate for Discharge     Problem: Nutrition Deficit:  Goal: Optimize nutritional status  8/4/2023 1656 by Albin Hennessy RN  Outcome: Adequate for Discharge     Problem: ABCDS Injury Assessment  Goal: Absence of physical injury  8/4/2023 1656 by Albin Hennessy RN  Outcome: Adequate for Discharge

## 2023-08-04 NOTE — CARE COORDINATION
[address] [address] [address] Encounter Date/Time: 2023 [facility] Account: [de-identified]    MRN: [id number]    Patient: Cristian Rubio    Contact Serial #: [id number]      ENCOUNTER          Patient Class: I Private Enc? No Unit RM BD: [address] PROG    Hospital Service: MED   Encounter DX: Cellulitis and abscess o*   ADM Provider: Kirit Wilde MD   Procedure:     ATT Provider: Kirit Wilde MD   REF Provider:        Admission DX: Cellulitis and abscess of trunk, Dehydration, Cellulitis, Elevated serum lactate dehydrogenase, Increased ileostomy output ([facility]) and DX codes: L03.319, L02.219, E86.0, L03.90, R74.02, R19.8, Z93.2      PATIENT                 Name: Cristian Rubio : 1960 (62 yrs)   Address: [address] Sex: Male   [facility] [address]         Marital Status: Single   Employer: [address]: [employer] [facility] [facility]   Primary Care Provider: Sandip Hanna [address] [place] [address] Road         Primary Phone: [phone]   EMERGENCY CONTACT   Contact Name Legal Guardian? Relationship to Patient Home Phone Work Phone   1. Nilam Lisa  2. *No Contact Specified*      Other    [phone]            GUARANTOR            Guarantor: Cristian Rubio     : 1960   Address: [address] Sex: Male     [facility] [address]     Relation to Patient: Self       Home Phone: [phone]   Guarantor ID: [id number]       Work Phone:     Guarantor Employer: [place] [id number]  [employer]         Status: RETIRED      COVERAGE        PRIMARY INSURANCE   Payor: [address]: [facility]*   Payor Address: [facility]       Group Number: [id number] Insurance Type: [address] [place] [address] Name: Pramod San : 1960   Subscriber ID: [id number] Pat.  Rel. to Sub: Self   SECONDARY INSURANCE   Payor:   Plan:     Payor Address:  ,           Group Number:   Insurance Type:     Subscriber Name:   Subscriber :

## 2023-08-05 LAB
MICROORGANISM SPEC CULT: NORMAL
SERVICE CMNT-IMP: NORMAL
SPECIMEN DESCRIPTION: NORMAL

## 2023-08-06 ENCOUNTER — TELEPHONE (OUTPATIENT)
Dept: PRIMARY CARE CLINIC | Age: 63
End: 2023-08-06

## 2023-08-07 ENCOUNTER — TELEPHONE (OUTPATIENT)
Dept: PRIMARY CARE CLINIC | Age: 63
End: 2023-08-07

## 2023-08-07 ENCOUNTER — CARE COORDINATION (OUTPATIENT)
Dept: CASE MANAGEMENT | Age: 63
End: 2023-08-07

## 2023-08-07 RX ORDER — ONDANSETRON 4 MG/1
4 TABLET, FILM COATED ORAL 3 TIMES DAILY PRN
Qty: 30 TABLET | Refills: 0 | Status: SHIPPED | OUTPATIENT
Start: 2023-08-07

## 2023-08-07 RX ORDER — PANTOPRAZOLE SODIUM 40 MG/1
40 TABLET, DELAYED RELEASE ORAL DAILY
Qty: 30 TABLET | Refills: 0 | Status: SHIPPED | OUTPATIENT
Start: 2023-08-07

## 2023-08-07 NOTE — CARE COORDINATION
Care Transitions Outreach Attempt    Call within 2 business days of discharge: Yes   Attempted to reach patient for transitions of care follow up. Unable to reach patient. 1st attempt to reach       Patient: Rahat Landin Patient : 1960 MRN: [id number]    Last Discharge [address]       Date Complaint Diagnosis Description Type Department Provider    23 Fatigue; Dizziness Dehydration . .. ED to Hosp-Admission (Discharged) (ADMITTED) Zaira Baugh MD;  Gosselin. .. Was this an external facility discharge?  No Discharge Facility: [facility]    Noted following upcoming appointments from discharge chart review:   [facility]):   Future Appointments   Date Time Provider [address]   2023  2:30 PM Kristal Lyles PC [facility]   2023  2:00 PM Mich Garcia MD [facility] GI [facility]   2023  1:30 PM VASU Lyles PC [facility]   2023 11:45 AM Lali Medina MD [facility]   2023  4:30 PM [facility] EMG  [facility]   2023  4:30 PM MD Sophie Saravia med/reha TOLP     Non-[facility] follow up appointment(s): n/a

## 2023-08-07 NOTE — TELEPHONE ENCOUNTER
Care Transitions Initial Follow Up Call    Outreach made within 2 business days of discharge: Yes    Patient: Savage Bland Patient : 1960   MRN: [id number]  Reason for Admission: There are no discharge diagnoses documented for the most recent discharge. Discharge Date: 23       Spoke with: patient    Discharge department/facility: [facility]    TCM Interactive Patient Contact:  Was patient able to fill all prescriptions: Yes  Was patient instructed to bring all medications to the follow-up visit: Yes  Is patient taking all medications as directed in the discharge summary?  Yes  Does patient understand their discharge instructions: Yes  Does patient have questions or concerns that need addressed prior to 7-14 day follow up office visit: - No    Scheduled appointment with PCP within 7-14 days    Follow Up  Future Appointments   Date Time Provider [address] [place] [address]   2023  2:00 PM [facility]   [facility]23  1[facility]30 PM [facility]   2023 11:45 AM Carlos Johnson MD [address] [place] [address]   2023  4:30 PM [facility] EMG  [facility]   2023  4:30 PM Jose Denis MD [address] [place] [address]

## 2023-08-08 ENCOUNTER — CARE COORDINATION (OUTPATIENT)
Dept: CASE MANAGEMENT | Age: 63
End: 2023-08-08

## 2023-08-08 NOTE — CARE COORDINATION
Care Transitions Outreach Attempt    Call within 2 business days of discharge: Yes   Attempted to reach patient for transitions of care follow up. Unable to reach patient. Final attempt to reach. TC to [facility], spoke with Winter who verified receipt of referral, status is pending. Patient: Veronique Worley Patient : 1960 MRN: [id number]    Last Discharge [address]       Date Complaint Diagnosis Description Type Department Provider    23 Fatigue; Dizziness Dehydration . .. ED to Hosp-Admission (Discharged) (ADMITTED) Jerry Cain MD; Traci Estrada. .. Was this an external facility discharge?  No Discharge Facility: [facility]    Noted following upcoming appointments from discharge chart review:   [facility] follow up appointment(s):   Future Appointments   Date Time Provider [address]   2023  2:30 PM Luis Kristal Gorman PC [facility]   2023  2:00 PM Carolyn Rodriguez MD [facility] GI [facility]   2023  1:30 PM VASU Baldwin PC [facility]   2023 11:45 AM Assunta Cranker, MD [facility]   2023  4:30 PM [facility] EMG  [facility] EMG [facility]   2023  4:30 PM Marylee Engel, MD Efraim Angles med/reha [facility]     Non-[facility] follow up appointment(s): n/a

## 2023-08-08 NOTE — DISCHARGE SUMMARY
Visualized portions of the pancreas are unremarkable. OTHER: No evidence of right upper quadrant ascites. Hepatic cirrhosis morphology. No focal lesion. No ascites. Patent tips Cholelithiasis. No acute findings. Consultations:    Consults:     Final Specialist Recommendations/Findings:   IP CONSULT TO GENERAL SURGERY  IP CONSULT TO INTERNAL MEDICINE  IP CONSULT TO SOCIAL WORK  IP CONSULT TO DIETITIAN  IP CONSULT TO GI      The patient was seen and examined on day of discharge and this discharge summary is in conjunction with any daily progress note from day of discharge.     Discharge plan:     Disposition: Home    Physician Follow Up:   [facility]  [address] [facility] [address]  [phone]        Talha Nassar, [address] [place], [place] [address] [place] [id number] [phone]    Schedule an appointment as soon as possible for a visit in 4 week(s)      Yadiel Fraser, Omkar Dave [facility] [address]  [phone]             Requiring Further Evaluation/Follow Up POST HOSPITALIZATION/Incidental Findings:    Diet: cardiac diet    Activity: As tolerated    Instructions to Patient:     Discharge Medications:      Medication List        START taking these medications      nadolol 20 MG tablet  Commonly known as: CORGARD  Take 1 tablet by mouth daily     rifAXIMin 200 MG tablet  Commonly known as: XIFAXAN  Take 2 tablets by mouth 3 times daily for 10 days            CONTINUE taking these medications      acetaminophen 325 MG tablet  Commonly known as: TYLENOL     FeroSul 325 (65 Fe) MG tablet  Generic drug: ferrous sulfate  take 1 tablet by mouth twice a day     furosemide 40 MG tablet  Commonly known as: LASIX     lactulose 10 GM/15ML solution  Commonly known as: CHRONULAC  take 30 milliliters by mouth three times a day     magnesium oxide 250 MG Tabs tablet  Commonly known as: MAG-OX  take 1 tablet by mouth daily     spironolactone 25 MG tablet  Commonly

## 2023-08-12 ENCOUNTER — HOSPITAL ENCOUNTER (OUTPATIENT)
Age: 63
Discharge: HOME | End: 2023-08-12
Payer: MEDICARE

## 2023-08-12 LAB
ALBUMIN SERPL-MCNC: 3.4 G/DL (ref 3.5–5.2)
ALP SERPL-CCNC: 151 U/L (ref 40–129)
ALT SERPL-CCNC: 15 U/L (ref 5–41)
ANION GAP SERPL CALCULATED.3IONS-SCNC: 12 MMOL/L (ref 9–17)
AST SERPL-CCNC: 51 U/L
BASOPHILS # BLD: 0.1 K/UL (ref 0–0.2)
BASOPHILS NFR BLD: 1 % (ref 0–2)
BILIRUB DIRECT SERPL-MCNC: 1 MG/DL
BILIRUB INDIRECT SERPL-MCNC: 1.9 MG/DL (ref 0–1)
BILIRUB SERPL-MCNC: 2.9 MG/DL (ref 0.3–1.2)
BUN SERPL-MCNC: 24 MG/DL (ref 8–23)
CALCIUM SERPL-MCNC: 10.3 MG/DL (ref 8.6–10.4)
CHLORIDE SERPL-SCNC: 98 MMOL/L (ref 98–107)
CO2 SERPL-SCNC: 23 MMOL/L (ref 20–31)
CREAT SERPL-MCNC: 1.3 MG/DL (ref 0.7–1.2)
EOSINOPHIL # BLD: 0.1 K/UL (ref 0–0.4)
EOSINOPHILS RELATIVE PERCENT: 1 % (ref 0–4)
ERYTHROCYTE [DISTWIDTH] IN BLOOD BY AUTOMATED COUNT: 16 % (ref 11.5–14.9)
GFR SERPL CREATININE-BSD FRML MDRD: >60 ML/MIN/1.73M2
GLUCOSE SERPL-MCNC: 90 MG/DL (ref 70–99)
HCT VFR BLD AUTO: 33.8 % (ref 41–53)
HGB BLD-MCNC: 11.2 G/DL (ref 13.5–17.5)
LYMPHOCYTES NFR BLD: 1.1 K/UL (ref 1–4.8)
LYMPHOCYTES RELATIVE PERCENT: 12 % (ref 24–44)
MCH RBC QN AUTO: 31.7 PG (ref 26–34)
MCHC RBC AUTO-ENTMCNC: 33 G/DL (ref 31–37)
MCV RBC AUTO: 96 FL (ref 80–100)
MONOCYTES NFR BLD: 1.3 K/UL (ref 0.1–1.3)
MONOCYTES NFR BLD: 15 % (ref 1–7)
NEUTROPHILS NFR BLD: 71 % (ref 36–66)
NEUTS SEG NFR BLD: 6.6 K/UL (ref 1.3–9.1)
PLATELET # BLD AUTO: 223 K/UL (ref 150–450)
PMV BLD AUTO: 7.6 FL (ref 6–12)
POTASSIUM SERPL-SCNC: 4 MMOL/L (ref 3.7–5.3)
PROT SERPL-MCNC: 6.9 G/DL (ref 6.4–8.3)
RBC # BLD AUTO: 3.52 M/UL (ref 4.5–5.9)
SODIUM SERPL-SCNC: 133 MMOL/L (ref 135–144)
WBC OTHER # BLD: 9.2 K/UL (ref 3.5–11)

## 2023-08-12 PROCEDURE — 80053 COMPREHEN METABOLIC PANEL: CPT

## 2023-08-12 PROCEDURE — 85025 COMPLETE CBC W/AUTO DIFF WBC: CPT

## 2023-08-12 PROCEDURE — 36415 COLL VENOUS BLD VENIPUNCTURE: CPT

## 2023-08-12 PROCEDURE — 82248 BILIRUBIN DIRECT: CPT

## 2023-08-18 ENCOUNTER — OFFICE VISIT (OUTPATIENT)
Dept: GASTROENTEROLOGY | Age: 63
End: 2023-08-18

## 2023-08-18 VITALS
SYSTOLIC BLOOD PRESSURE: 115 MMHG | HEART RATE: 73 BPM | BODY MASS INDEX: 26.17 KG/M2 | DIASTOLIC BLOOD PRESSURE: 67 MMHG | HEIGHT: 70 IN | TEMPERATURE: 97.2 F | WEIGHT: 182.8 LBS

## 2023-08-18 DIAGNOSIS — K70.31 ASCITES DUE TO ALCOHOLIC CIRRHOSIS (HCC): ICD-10-CM

## 2023-08-18 DIAGNOSIS — K31.89 PORTAL HYPERTENSIVE GASTROPATHY (HCC): ICD-10-CM

## 2023-08-18 DIAGNOSIS — K76.6 PORTAL HYPERTENSIVE GASTROPATHY (HCC): ICD-10-CM

## 2023-08-18 DIAGNOSIS — K22.711 BARRETT'S ESOPHAGUS WITH HIGH GRADE DYSPLASIA: ICD-10-CM

## 2023-08-18 DIAGNOSIS — K76.6 PORTAL HYPERTENSION (HCC): Primary | ICD-10-CM

## 2023-08-18 DIAGNOSIS — C15.5 MALIGNANT NEOPLASM OF LOWER THIRD OF ESOPHAGUS (HCC): ICD-10-CM

## 2023-08-18 DIAGNOSIS — K74.69 DECOMPENSATED LIVER DISEASE (HCC): ICD-10-CM

## 2023-08-18 DIAGNOSIS — K70.31 ALCOHOLIC CIRRHOSIS OF LIVER WITH ASCITES (HCC): ICD-10-CM

## 2023-08-18 ASSESSMENT — ENCOUNTER SYMPTOMS
SORE THROAT: 0
ABDOMINAL PAIN: 0
DIARRHEA: 0
NAUSEA: 0
ANAL BLEEDING: 0
ABDOMINAL DISTENTION: 0
VOMITING: 0
COUGH: 0
BACK PAIN: 0
SINUS PRESSURE: 0
CONSTIPATION: 0
CHOKING: 0
RECTAL PAIN: 0
WHEEZING: 0
VOICE CHANGE: 0
TROUBLE SWALLOWING: 0
BLOOD IN STOOL: 0

## 2023-08-18 NOTE — PROGRESS NOTES
GI OFFICE FOLLOW UP    INTERVAL HISTORY:   No referring provider defined for this encounter. Chief Complaint   Patient presents with    Follow-Up from Hospital     Patient is here today to be seen for a hospital follow up for anemia and coffee ground emesis. 1. Portal hypertension ([address])    2. Alcoholic cirrhosis of liver with ascites ([address])    3. Decompensated liver disease ([address])    4. Ascites due to alcoholic cirrhosis ([address])    5. Portal hypertensive gastropathy (HCC)    6. Lezama's esophagus with high grade dysplasia    7. Malignant neoplasm of lower third of esophagus (HCC)        HISTORY OF PRESENT ILLNESS:     Patient being seen for lab follow-up. Patient was recently hospitalized for fatigue, cellulitis. He had EGD during his hospitalization for questionable melena. Large esophageal varix x 2, flattened on insufflation. No high risk features noted. Evidence of previously placed clip noted just below the GE junction. Possible AVM vs varix that was bleeding at the clip site. Another clip was placed adjacent to the first clip to cut off the blood supple from feeding vessel. No ongoing bleeding observed and hemostasis was noted. Mild-moderate portal Hypertensive gastropathy noted. 1 small AVM in duodenum. Ablated with APC. Clinically stable. Taking diuretics as directed  No ascites  No encephalopathy  No abdominal pain  Tolerating diet well  Unclear if he is following a low sodium diet. Past Medical,Family, and Social History reviewed and does contribute to the patient presenting condition. Patient's PMH/PSH,SH,PSYCH Hx, MEDs, ALLERGIES, and ROS were all reviewed and updated in the appropriate sections.  Yes      PAST MEDICAL HISTORY:  Past Medical History:   Diagnosis Date    Abnormal EKG     Acute deep vein thrombosis (DVT) of brachial vein of right upper extremity

## 2023-08-28 ENCOUNTER — HOSPITAL ENCOUNTER (OUTPATIENT)
Age: 63
Discharge: HOME | End: 2023-08-28
Payer: MEDICARE

## 2023-08-28 DIAGNOSIS — K74.69 DECOMPENSATED LIVER DISEASE (HCC): ICD-10-CM

## 2023-08-28 DIAGNOSIS — K76.6 PORTAL HYPERTENSIVE GASTROPATHY (HCC): ICD-10-CM

## 2023-08-28 DIAGNOSIS — K31.89 PORTAL HYPERTENSIVE GASTROPATHY (HCC): ICD-10-CM

## 2023-08-28 DIAGNOSIS — K76.6 PORTAL HYPERTENSION (HCC): ICD-10-CM

## 2023-08-28 DIAGNOSIS — K70.31 ASCITES DUE TO ALCOHOLIC CIRRHOSIS (HCC): ICD-10-CM

## 2023-08-28 DIAGNOSIS — K70.31 ALCOHOLIC CIRRHOSIS OF LIVER WITH ASCITES (HCC): ICD-10-CM

## 2023-08-28 LAB
ALBUMIN SERPL-MCNC: 3.5 G/DL (ref 3.5–5.2)
ALP SERPL-CCNC: 160 U/L (ref 40–129)
ALT SERPL-CCNC: 16 U/L (ref 5–41)
ANION GAP SERPL CALCULATED.3IONS-SCNC: 10 MMOL/L (ref 9–17)
AST SERPL-CCNC: 77 U/L
BASOPHILS # BLD: 0 K/UL (ref 0–0.2)
BASOPHILS NFR BLD: 1 % (ref 0–2)
BILIRUB SERPL-MCNC: 1.2 MG/DL (ref 0.3–1.2)
BUN SERPL-MCNC: 9 MG/DL (ref 8–23)
CALCIUM SERPL-MCNC: 8.6 MG/DL (ref 8.6–10.4)
CHLORIDE SERPL-SCNC: 107 MMOL/L (ref 98–107)
CO2 SERPL-SCNC: 23 MMOL/L (ref 20–31)
CREAT SERPL-MCNC: 0.8 MG/DL (ref 0.7–1.2)
EOSINOPHIL # BLD: 0.1 K/UL (ref 0–0.4)
EOSINOPHILS RELATIVE PERCENT: 2 % (ref 0–4)
ERYTHROCYTE [DISTWIDTH] IN BLOOD BY AUTOMATED COUNT: 14.3 % (ref 11.5–14.9)
GFR SERPL CREATININE-BSD FRML MDRD: >60 ML/MIN/1.73M2
GLUCOSE SERPL-MCNC: 121 MG/DL (ref 70–99)
HCT VFR BLD AUTO: 33.2 % (ref 41–53)
HGB BLD-MCNC: 11.2 G/DL (ref 13.5–17.5)
LYMPHOCYTES NFR BLD: 0.9 K/UL (ref 1–4.8)
LYMPHOCYTES RELATIVE PERCENT: 19 % (ref 24–44)
MCH RBC QN AUTO: 33.2 PG (ref 26–34)
MCHC RBC AUTO-ENTMCNC: 33.7 G/DL (ref 31–37)
MCV RBC AUTO: 98.4 FL (ref 80–100)
MONOCYTES NFR BLD: 0.5 K/UL (ref 0.1–1.3)
MONOCYTES NFR BLD: 10 % (ref 1–7)
NEUTROPHILS NFR BLD: 68 % (ref 36–66)
NEUTS SEG NFR BLD: 3.2 K/UL (ref 1.3–9.1)
PLATELET # BLD AUTO: 89 K/UL (ref 150–450)
PMV BLD AUTO: 7.2 FL (ref 6–12)
POTASSIUM SERPL-SCNC: 4.4 MMOL/L (ref 3.7–5.3)
PROT SERPL-MCNC: 6.8 G/DL (ref 6.4–8.3)
RBC # BLD AUTO: 3.37 M/UL (ref 4.5–5.9)
SODIUM SERPL-SCNC: 140 MMOL/L (ref 135–144)
WBC OTHER # BLD: 4.7 K/UL (ref 3.5–11)

## 2023-08-28 PROCEDURE — 85025 COMPLETE CBC W/AUTO DIFF WBC: CPT

## 2023-08-28 PROCEDURE — 36415 COLL VENOUS BLD VENIPUNCTURE: CPT

## 2023-08-28 PROCEDURE — 80053 COMPREHEN METABOLIC PANEL: CPT

## 2023-08-29 ENCOUNTER — OFFICE VISIT (OUTPATIENT)
Dept: ONCOLOGY | Age: 63
End: 2023-08-29
Payer: MEDICARE

## 2023-08-29 ENCOUNTER — TELEPHONE (OUTPATIENT)
Dept: ONCOLOGY | Age: 63
End: 2023-08-29

## 2023-08-29 VITALS
OXYGEN SATURATION: 100 % | TEMPERATURE: 97.6 F | BODY MASS INDEX: 25.61 KG/M2 | HEART RATE: 75 BPM | SYSTOLIC BLOOD PRESSURE: 155 MMHG | DIASTOLIC BLOOD PRESSURE: 79 MMHG | WEIGHT: 178.5 LBS

## 2023-08-29 DIAGNOSIS — D64.9 ANEMIA, UNSPECIFIED TYPE: ICD-10-CM

## 2023-08-29 DIAGNOSIS — C15.5 MALIGNANT NEOPLASM OF LOWER THIRD OF ESOPHAGUS (HCC): Primary | ICD-10-CM

## 2023-08-29 DIAGNOSIS — D69.6 THROMBOCYTOPENIA (HCC): ICD-10-CM

## 2023-08-29 PROCEDURE — 3078F DIAST BP <80 MM HG: CPT | Performed by: INTERNAL MEDICINE

## 2023-08-29 PROCEDURE — 99211 OFF/OP EST MAY X REQ PHY/QHP: CPT | Performed by: INTERNAL MEDICINE

## 2023-08-29 PROCEDURE — 99214 OFFICE O/P EST MOD 30 MIN: CPT | Performed by: INTERNAL MEDICINE

## 2023-08-29 PROCEDURE — 3077F SYST BP >= 140 MM HG: CPT | Performed by: INTERNAL MEDICINE

## 2023-08-29 NOTE — PROGRESS NOTES
Patient ID: Geovanna Sapp, [birth year], [id number], 58 y.o. Referred by : Dr Martínez Toledo  Reason for consultation:   Esophageal cancer T2N0Mx  Stage II   started on concurrent chemoradiation preoperatively on 5/4/21  Chemoradiation completed 6/9/21  Patient had a PET scan on 7/23/2021 which showed no evidence of recurrence  Patient has been evaluated by thoracic surgeon at [facility] Dr. Caryle Man and also hepatologist Dr. Stephanie Wiggins his case was discussed at thoracic tumor oncology board with recommendations NOT to do any surgery because of his liver failure. So surveillance recommended  He had an upper endoscopy on 8/31/21 which showed no residual cancer but showed Lezama's esophagus with high-grade dysplasia. Another endoscopy on 1/21/2022 was negative for recurrence  HISTORY OF PRESENT ILLNESS:    Oncologic History:  Geovanna Sapp is a 58 y.o. male with a history of hepatitis C, status post treatment, liver cirrhosis, history of alcohol abuse was seen during initial consultation visit for newly diagnosed esophageal cancer. Patient reportedly had \"heavy drinking alcohol in about 2016 however recently in December he had 2 beers and he presented with hematemesis. On 12/29/2020 he had upper endoscopy which showed severe portal hypertension, gastropathy. The biopsy from the GE junction showed invasive adenocarcinoma. Patient had a follow-up EGD on 2/2/2021 which confirmed esophageal adenocarcinoma. Patient had endoscopic ultrasound on 2/12/2021 which showed T2 N0 MX disease with underlying esophageal varices. In the esophagus hypoechoic lesion noted in the lower esophagus penetrating into submucosa and into muscularis propria. No lymphadenopathy noted. Patient was referred to medical oncology for further recommendations. He denies any difficulty swallowing. He does not smoke he has quit smoking in 2016. He has not drank alcohol since December.   He has a history of hepatitis C and

## 2023-08-29 NOTE — TELEPHONE ENCOUNTER
AVS from 8/29/23    RTC in 3 months with labs and CT    Rv sched for 11/28 @ 11:45     Pt to schedule  ct    Pt was given AVS and appointment schedule    Electronically signed by Justus Platt on 8/29/2023 at 12:18 PM

## 2023-08-30 RX ORDER — PANTOPRAZOLE SODIUM 40 MG/1
TABLET, DELAYED RELEASE ORAL
Qty: 30 TABLET | Refills: 0 | Status: SHIPPED | OUTPATIENT
Start: 2023-08-30

## 2023-08-30 NOTE — TELEPHONE ENCOUNTER
LAST VISIT:   5/19/2023     Future Appointments   Date Time Provider [address]   9/11/2023  4:30 PM [facility] EMG  [facility]   9/11/2023  4:30 PM Zoë Nava MD [facility] med[facility]reha [facility]   9/22/2023  1:00 PM [address] Radha Everett MD [facility]   11/14/2023  1:45 PM STC CT RM 1 [facility] CT SCAN [facility] Radiolog   11/28/2023 11:45 AM Garrison Felton MD [address] [place] [address]

## 2023-09-01 PROBLEM — E86.0 DEHYDRATION: Status: RESOLVED | Noted: 2023-08-02 | Resolved: 2023-09-01

## 2023-09-06 DIAGNOSIS — K70.30 ALCOHOLIC CIRRHOSIS, UNSPECIFIED WHETHER ASCITES PRESENT (HCC): ICD-10-CM

## 2023-09-06 RX ORDER — LACTULOSE 10 G/15ML
SOLUTION ORAL
Qty: 2700 ML | Refills: 3 | Status: SHIPPED | OUTPATIENT
Start: 2023-09-06

## 2023-09-11 ENCOUNTER — HOSPITAL ENCOUNTER (OUTPATIENT)
Dept: NEUROLOGY | Age: 63
Discharge: HOME | End: 2023-09-11
Payer: MEDICARE

## 2023-09-11 PROCEDURE — 95912 NRV CNDJ TEST 11-12 STUDIES: CPT | Performed by: PHYSICAL MEDICINE & REHABILITATION

## 2023-09-11 PROCEDURE — 95886 MUSC TEST DONE W/N TEST COMP: CPT | Performed by: PHYSICAL MEDICINE & REHABILITATION

## 2023-09-15 ENCOUNTER — OFFICE VISIT (OUTPATIENT)
Dept: PRIMARY CARE CLINIC | Age: 63
End: 2023-09-15
Payer: MEDICARE

## 2023-09-15 ENCOUNTER — HOSPITAL ENCOUNTER (OUTPATIENT)
Age: 63
Discharge: HOME | End: 2023-09-15
Payer: MEDICARE

## 2023-09-15 VITALS
OXYGEN SATURATION: 98 % | BODY MASS INDEX: 25.05 KG/M2 | HEART RATE: 84 BPM | WEIGHT: 175 LBS | DIASTOLIC BLOOD PRESSURE: 84 MMHG | HEIGHT: 70 IN | SYSTOLIC BLOOD PRESSURE: 120 MMHG

## 2023-09-15 DIAGNOSIS — M62.542 ATROPHY OF LEFT HAND MUSCLES: ICD-10-CM

## 2023-09-15 DIAGNOSIS — C15.5 MALIGNANT NEOPLASM OF LOWER THIRD OF ESOPHAGUS (HCC): ICD-10-CM

## 2023-09-15 DIAGNOSIS — R11.0 NAUSEA: Primary | ICD-10-CM

## 2023-09-15 DIAGNOSIS — R20.2 NUMBNESS AND TINGLING IN LEFT HAND: ICD-10-CM

## 2023-09-15 DIAGNOSIS — R20.0 NUMBNESS AND TINGLING IN LEFT HAND: ICD-10-CM

## 2023-09-15 LAB
ALBUMIN SERPL-MCNC: 4 G/DL (ref 3.5–5.2)
ALP SERPL-CCNC: 156 U/L (ref 40–129)
ALT SERPL-CCNC: 25 U/L (ref 5–41)
ANION GAP SERPL CALCULATED.3IONS-SCNC: 15 MMOL/L (ref 9–17)
AST SERPL-CCNC: 92 U/L
BASOPHILS # BLD: 0 K/UL (ref 0–0.2)
BASOPHILS NFR BLD: 0 % (ref 0–2)
BILIRUB SERPL-MCNC: 2.2 MG/DL (ref 0.3–1.2)
BUN SERPL-MCNC: 16 MG/DL (ref 8–23)
CALCIUM SERPL-MCNC: 9.7 MG/DL (ref 8.6–10.4)
CEA SERPL-MCNC: 13.4 NG/ML
CHLORIDE SERPL-SCNC: 99 MMOL/L (ref 98–107)
CO2 SERPL-SCNC: 21 MMOL/L (ref 20–31)
CREAT SERPL-MCNC: 1.4 MG/DL (ref 0.7–1.2)
EOSINOPHIL # BLD: 0.05 K/UL (ref 0–0.4)
EOSINOPHILS RELATIVE PERCENT: 1 % (ref 0–4)
ERYTHROCYTE [DISTWIDTH] IN BLOOD BY AUTOMATED COUNT: 13.5 % (ref 11.5–14.9)
GFR SERPL CREATININE-BSD FRML MDRD: 56 ML/MIN/1.73M2
GLUCOSE SERPL-MCNC: 126 MG/DL (ref 70–99)
HCT VFR BLD AUTO: 39.4 % (ref 41–53)
HGB BLD-MCNC: 13.4 G/DL (ref 13.5–17.5)
LYMPHOCYTES NFR BLD: 0.69 K/UL (ref 1–4.8)
LYMPHOCYTES RELATIVE PERCENT: 13 % (ref 24–44)
MCH RBC QN AUTO: 33.3 PG (ref 26–34)
MCHC RBC AUTO-ENTMCNC: 33.9 G/DL (ref 31–37)
MCV RBC AUTO: 98.3 FL (ref 80–100)
MONOCYTES NFR BLD: 0.9 K/UL (ref 0.1–1.3)
MONOCYTES NFR BLD: 17 % (ref 1–7)
MORPHOLOGY: NORMAL
NEUTROPHILS NFR BLD: 69 % (ref 36–66)
NEUTS SEG NFR BLD: 3.66 K/UL (ref 1.3–9.1)
PLATELET # BLD AUTO: 113 K/UL (ref 150–450)
PMV BLD AUTO: 7.2 FL (ref 6–12)
POTASSIUM SERPL-SCNC: 3.8 MMOL/L (ref 3.7–5.3)
PROT SERPL-MCNC: 7.8 G/DL (ref 6.4–8.3)
RBC # BLD AUTO: 4.01 M/UL (ref 4.5–5.9)
SODIUM SERPL-SCNC: 135 MMOL/L (ref 135–144)
WBC OTHER # BLD: 5.3 K/UL (ref 3.5–11)

## 2023-09-15 PROCEDURE — 82378 CARCINOEMBRYONIC ANTIGEN: CPT

## 2023-09-15 PROCEDURE — 3074F SYST BP LT 130 MM HG: CPT | Performed by: PHYSICIAN ASSISTANT

## 2023-09-15 PROCEDURE — 99214 OFFICE O/P EST MOD 30 MIN: CPT | Performed by: PHYSICIAN ASSISTANT

## 2023-09-15 PROCEDURE — 36415 COLL VENOUS BLD VENIPUNCTURE: CPT

## 2023-09-15 PROCEDURE — 3079F DIAST BP 80-89 MM HG: CPT | Performed by: PHYSICIAN ASSISTANT

## 2023-09-15 PROCEDURE — 80053 COMPREHEN METABOLIC PANEL: CPT

## 2023-09-15 PROCEDURE — 85025 COMPLETE CBC W/AUTO DIFF WBC: CPT

## 2023-09-15 RX ORDER — ONDANSETRON 4 MG/1
4 TABLET, FILM COATED ORAL 3 TIMES DAILY PRN
Qty: 30 TABLET | Refills: 0 | Status: SHIPPED | OUTPATIENT
Start: 2023-09-15

## 2023-09-15 RX ORDER — SODIUM CHLORIDE, SODIUM LACTATE, POTASSIUM CHLORIDE, CALCIUM CHLORIDE 600; 310; 30; 20 MG/100ML; MG/100ML; MG/100ML; MG/100ML
INJECTION, SOLUTION INTRAVENOUS CONTINUOUS
Status: CANCELLED | OUTPATIENT
Start: 2023-09-15

## 2023-09-15 SDOH — ECONOMIC STABILITY: INCOME INSECURITY: HOW HARD IS IT FOR YOU TO PAY FOR THE VERY BASICS LIKE FOOD, HOUSING, MEDICAL CARE, AND HEATING?: NOT HARD AT ALL

## 2023-09-15 SDOH — ECONOMIC STABILITY: FOOD INSECURITY: WITHIN THE PAST 12 MONTHS, THE FOOD YOU BOUGHT JUST DIDN'T LAST AND YOU DIDN'T HAVE MONEY TO GET MORE.: NEVER TRUE

## 2023-09-15 SDOH — ECONOMIC STABILITY: HOUSING INSECURITY
IN THE LAST 12 MONTHS, WAS THERE A TIME WHEN YOU DID NOT HAVE A STEADY PLACE TO SLEEP OR SLEPT IN A SHELTER (INCLUDING NOW)?: NO

## 2023-09-15 SDOH — ECONOMIC STABILITY: FOOD INSECURITY: WITHIN THE PAST 12 MONTHS, YOU WORRIED THAT YOUR FOOD WOULD RUN OUT BEFORE YOU GOT MONEY TO BUY MORE.: NEVER TRUE

## 2023-09-15 ASSESSMENT — ENCOUNTER SYMPTOMS
SHORTNESS OF BREATH: 0
SINUS PRESSURE: 0
SORE THROAT: 0
CHEST TIGHTNESS: 0
RHINORRHEA: 0
COUGH: 0

## 2023-09-15 NOTE — DISCHARGE INSTRUCTIONS
Pre-operative Instructions           NOTHING to eat or drink after midnight the night prior to surgery   (This includes gum, candy, mints, chewing tobacco, etc). (Follow bowel prep instructions as/if instructed by your surgeon.)    Please arrive at the surgery center (Entrance B) by 5:45 AM on 9/26/2023 (or as directed by your surgeon's office). See Directons to Surgery Center below. Please take only the following medication(s) the day of surgery with a small sip of water: Nadolol, Pantoprazole    Please stop any blood thinning medications  AS DIRECTED BY PRESCRIBING PROVIDER:  Failure to stop these medications as instructed (too soon or too late) may result in injury to you, or your surgery may need to be rescheduled. Below is a list of some examples for your reference. Antiplatelets : (stop blood cells (called platelets) from sticking together and forming a blood clot):   Aspirin (Bufferin, Ecotrin), Clopidogrel (Plavix), Ticagrelor (Brilinta), Prasugrel (Effient), Dipyridamole/aspirin (Aggrenox), Ticlopidine (Ticlid), Eptifibatide (Integrilin)    Anticoagulants: (slow down your body's process of making clots): Warfarin (Coumadin), Rivaroxaban (Xarelto), Dabigatran (Pradaxa), Apixaban (Eliquis), Edoxaban (Savaysa), Heparin/Enoxaparin (Lovenox), Fondaparinux (Arixtra)    NSAIDS: Aspirin (Bufferin, Ecotrin), Ibuprofen (Motrin, Nuprin,Advil), Naproxen (Aleve),Meloxicam (Mobic), Celecoxib (Celebrex), Diclofenac (Voltaren), Etodolac (Lodine), Indomethacin, Ketorolac, Nabumetone, Oxaprozin (Daypro), Piroxicam (Feldene), Excedrin (has aspirin in it)    Herbals/supplements: Bromelain, Cinnamon, [employer], Dong [place] (female ginseng), Fish oil, Garlic, Silvia,Ginkgo biloba, Grape seed extract, Turmeric, Vitamin E, etc....)    You may continue the rest of your medications through the night before surgery unless instructed otherwise.     If applicable:   -Do not take diabetic medications on the day of

## 2023-09-18 NOTE — RESULT ENCOUNTER NOTE
Call and advise patient that the results showed radiculopathy which patient should continue with MRI cervical spine as ordered.

## 2023-09-21 ENCOUNTER — HOSPITAL ENCOUNTER (OUTPATIENT)
Dept: PREADMISSION TESTING | Age: 63
Discharge: HOME | End: 2023-09-25

## 2023-09-21 ENCOUNTER — TELEPHONE (OUTPATIENT)
Dept: PRIMARY CARE CLINIC | Age: 63
End: 2023-09-21

## 2023-09-21 VITALS
OXYGEN SATURATION: 99 % | RESPIRATION RATE: 16 BRPM | DIASTOLIC BLOOD PRESSURE: 66 MMHG | SYSTOLIC BLOOD PRESSURE: 117 MMHG | TEMPERATURE: 97 F | HEART RATE: 65 BPM | WEIGHT: 176 LBS | BODY MASS INDEX: 27.62 KG/M2 | HEIGHT: 67 IN

## 2023-09-21 DIAGNOSIS — I82.621 ACUTE DEEP VEIN THROMBOSIS (DVT) OF BRACHIAL VEIN OF RIGHT UPPER EXTREMITY (HCC): Primary | ICD-10-CM

## 2023-09-21 NOTE — TELEPHONE ENCOUNTER
Refer him to [facility] for pre op clearance. Will his surgeon except clearance from a primary care doctor or s he requiring him to have one from cardiology?

## 2023-09-21 NOTE — PROGRESS NOTES
Anesthesia Focused Assessment    Hx of anesthesia complications:  PONV, states he takes oral zofran prior which works, has never had scopolamine patch; prolonged emergence  Family hx of anesthesia complications:  no      Tested positive for Covid-19 in last 8 weeks: no      STOP-BANG Sleep Apnea Questionnaire    SNORE loudly (heard through closed doors)? No  TIRED, fatigued, sleepy during daytime? No  OBSERVED stopping breathing during sleep? No  High blood PRESSURE or being treated? Yes    BMI over 35? No  AGE over 48? Yes  NECK circumference over 16\"? No  GENDER (male)? Yes             Total 3  High risk 5-8  Intermediate risk 3-4  Low risk 0-2    ----------------------------------------------------------------------------------------------------------------------  ONOFRE                              No  If yes, machine? DM1                                            No  DM2                   No    Coronary Artery Disease      No  HTN         Yes, on meds  Defib/AICD/Pacemaker               No             Renal Failure                   No  If yes, on dialysis           Active smoker? No, quit 2017  Drinks alcohol? No, quit 1310  Illicit drugs?         No longer, h/o cocaine, quit 2016  Dentition?        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 Carly Crane, orthopedic, every 3-4 months, gets steroid injection    Lezama esophagus     Bleeding gastric varices 12/29/2022    BPH (benign prostatic hypertrophy)     Cholelithiasis     Cirrhosis ([address])     COVID-19 12/2020    pt reports he had a positive test while at

## 2023-09-21 NOTE — TELEPHONE ENCOUNTER
Pt needs cardiac clearance for upcoming surgery.  Spoke with pt he has no preference just asks that they are with [facility]

## 2023-09-22 ENCOUNTER — OFFICE VISIT (OUTPATIENT)
Dept: GASTROENTEROLOGY | Age: 63
End: 2023-09-22
Payer: COMMERCIAL

## 2023-09-22 ENCOUNTER — TELEPHONE (OUTPATIENT)
Dept: GASTROENTEROLOGY | Age: 63
End: 2023-09-22

## 2023-09-22 VITALS
WEIGHT: 177 LBS | DIASTOLIC BLOOD PRESSURE: 75 MMHG | HEART RATE: 72 BPM | TEMPERATURE: 97.3 F | HEIGHT: 67 IN | BODY MASS INDEX: 27.78 KG/M2 | SYSTOLIC BLOOD PRESSURE: 125 MMHG

## 2023-09-22 DIAGNOSIS — R97.0 HIGH CARCINOEMBRYONIC ANTIGEN (CEA): Primary | ICD-10-CM

## 2023-09-22 PROCEDURE — 99214 OFFICE O/P EST MOD 30 MIN: CPT | Performed by: INTERNAL MEDICINE

## 2023-09-22 PROCEDURE — 3078F DIAST BP <80 MM HG: CPT | Performed by: INTERNAL MEDICINE

## 2023-09-22 PROCEDURE — 3074F SYST BP LT 130 MM HG: CPT | Performed by: INTERNAL MEDICINE

## 2023-09-22 ASSESSMENT — ENCOUNTER SYMPTOMS
WHEEZING: 0
SORE THROAT: 0
SINUS PRESSURE: 0
ANAL BLEEDING: 0
CONSTIPATION: 0
VOICE CHANGE: 0
COUGH: 0
VOMITING: 1
ABDOMINAL PAIN: 0
BACK PAIN: 0
ABDOMINAL DISTENTION: 0
RECTAL PAIN: 1
NAUSEA: 1
BLOOD IN STOOL: 0
DIARRHEA: 1
TROUBLE SWALLOWING: 0
CHOKING: 0

## 2023-09-22 NOTE — TELEPHONE ENCOUNTER
Patient scheduled for Dr. Marilee Mckinley [phone] on 10/3/23 @230pm and Dr Jeane Suh (cardiologist) [phone] is out/office closes at 1pm on Fridays. Writer instructed patient to contact office on Monday when office opens.

## 2023-09-22 NOTE — PROGRESS NOTES
GI OFFICE FOLLOW UP    INTERVAL HISTORY:   No referring provider defined for this encounter. Chief Complaint   Patient presents with    Discuss Labs     Patient is here today to be seen to follow up on his lab results. No diagnosis found. HISTORY OF PRESENT ILLNESS:   Patient seen along with his wife. Patient is here for follow-up of liver disease. He had right hemicolectomy and end ileostomy done on 3/25/2023. Apparently at that time patient had bowel perforation secondary to colon ischemia. Patient also known to have chronic alcoholism and cirrhosis of the liver, decompensated. He has a history of encephalopathy significant ascites, portal hypertension esophageal varices. Complicating to all these, patient also had stage II esophageal cancer felt that he is not an operable candidate. Patient had chemoradiation therapy and recent EGD did not reveal luminal recurrence of esophageal cancer. At present patient is interested to have ileostomy reversal.  Looks like this is scheduled in the next few weeks at the advice of the general surgeon. In the last few months ascites appears to be well controlled. He does not have recent encephalopathy. However, he continues to drink alcohol. He is on low-dose diuretic therapy. Recent labs reviewed and a CEA level increase it to 13.4. In November 2022 CEA level was 4.9. He is not chronic smoker. Patient denies abdominal pain. Bowel movements appears to be satisfactory. No nausea vomiting. Recent labs done on 9/15/2023 revealed hemoglobin is about 13 platelet count is 077. Bilirubin 2.2 alkaline phosphatase 156 AST 92 ALT 25. Past Medical,Family, and Social History reviewed and does contribute to the patient presenting condition.       Patient's PMH/PSH,SH,PSYCH Hx, MEDs, ALLERGIES, and ROS were all reviewed and

## 2023-09-25 ENCOUNTER — TELEPHONE (OUTPATIENT)
Dept: ONCOLOGY | Age: 63
End: 2023-09-25

## 2023-09-25 DIAGNOSIS — C15.5 MALIGNANT NEOPLASM OF LOWER THIRD OF ESOPHAGUS (HCC): Primary | ICD-10-CM

## 2023-09-25 NOTE — TELEPHONE ENCOUNTER
9/25/23 Updating Pt Sched      Pt's Ex Wife was called this morning to be updated on Pt's upcoming schedule.     A PET scan was scheduled for 10/12/23 @ 8:00am    A MD visit was scheduled for 10/17/23 @ 2:30pm     Electronically signed by Houston Kunz on 9/25/2023 at 9:14 AM

## 2023-09-26 ENCOUNTER — TELEPHONE (OUTPATIENT)
Dept: GASTROENTEROLOGY | Age: 63
End: 2023-09-26

## 2023-09-26 NOTE — TELEPHONE ENCOUNTER
Angelica Mayo asking for patient to return phone call for colon procedure scheduling with [facility]. Tasha would like patient to have procedure within 2-3 wks.

## 2023-10-03 RX ORDER — PANTOPRAZOLE SODIUM 40 MG/1
TABLET, DELAYED RELEASE ORAL
Qty: 30 TABLET | Refills: 0 | Status: SHIPPED | OUTPATIENT
Start: 2023-10-03

## 2023-10-03 NOTE — TELEPHONE ENCOUNTER
LAST VISIT:   9/15/2023     Future Appointments   Date Time Provider [address]   10/26/2023  1:00 PM [place] NM INJECTION ROOM [facility] PB NM [facility] [place]   10/26/2023  2:00 PM [place] PET/CT ROOM [facility] PB [facility]   10/30/2023  1:00 PM [facility] MRI  [facility] MRI [facility] Radiolog   11/7/2023  2:15 PM Ashley Hernandez MD [facility]   11/14/2023  1:45 PM STC CT RM 1 STCZ CT SCAN [facility] Radiolog   11/28/2023 11:45 AM Garrison Sanchez MD [address] [place] [address]

## 2023-10-09 RX ORDER — ONDANSETRON 4 MG/1
TABLET, ORALLY DISINTEGRATING ORAL
Qty: 90 TABLET | Refills: 5 | Status: ON HOLD | OUTPATIENT
Start: 2023-10-09

## 2023-10-09 ASSESSMENT — ENCOUNTER SYMPTOMS
STRIDOR: 0
COLOR CHANGE: 0
ABDOMINAL DISTENTION: 0
EYE PAIN: 0
CHOKING: 0
APNEA: 0
ANAL BLEEDING: 0
EYE DISCHARGE: 0
PHOTOPHOBIA: 0
CONSTIPATION: 0

## 2023-10-09 NOTE — H&P
General Surgery  H&P        PATIENT NAME: Mariposa Seymour   YOB: 1960    ADMISSION DATE: 10/10/2023    Admitting Physician: Dr Hanh Menchaca: 10/9/2023    Reason for admission: Ileostomy in place    Chief complaint: Ileostomy in place    HISTORY OF PRESENT ILLNESS:  The patient is a 58 y.o. male  who presents with severe comorbidities of cirrhosis, chronic kidney disease, anemia arthritis and history of alcoholism status post exploratory laparotomy with right colectomy and ileostomy due to colonic perforation. Patient has had numerous readmissions due to dehydration from ileostomy output and now presents for reversal of ileostomy.     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 Rehan Kearney, orthopedic, every 3-4 months, gets steroid injection    Lezama esophagus     Bleeding gastric varices 12/29/2022    BPH (benign prostatic hypertrophy)     Cholelithiasis     Cirrhosis ([address])     COVID-19 12/2020    pt reports he had a positive test while at [facility] in 2020, was asymptomatic    COVID-19 vaccine series completed 5/20/2021, 6/22/2021    Moderna 5/20/2021, 6/22/2021    DDD (degenerative disc disease), lumbar     Depression     Esophageal cancer ([address])     INVASIVE ADENOCARCINOMA ARISING IN TUBULAR ADENOMA WITH HIGH GRADE DYSPLASIA, ASSOCIATED WITH FOCAL INTESTINAL METAPLASIA     Esophageal varices ([address])     Fatty liver     GERD (gastroesophageal reflux disease)     GI bleed     Heart murmur     Hep C w/o coma, chronic ([address])     Hiatal hernia     History of alcohol abuse     6-12 beers a day; quit drinking 2019    History of blood transfusion
[de-identified] : \par Patient is a 49 year old female with a history of arthritis, pancreatitis, hypertension, diet controlled diabetes, intraductal papillary mucinous neoplasm (IPMN of the pancreas), gastric sleeve bariatric surgery, pulmonary embolism, recurrent DVT, and most recently, splenic vein thrombosis, who was referred for evaluation of hypercoagulable state. An extensive thrombophilic workup at three different hospitals and at the time of the consultation was negative. The splenic vein thrombosis was discovered in May of 2019 while the patient was taking Xarelto, perhaps as a result of incomplete absorption after bariatric surgery.  There is also an extensive family history of thrombotic events.\par Patient also has been B12 deficient, likely as a result of the bariatric surgery, and recently started replacement by injection. She complains of fatigue, abdominal pain after eating, relieved by having bowel movements, and chronic pain in the outer side of her left lower left extremity. She is scheduled for right hip replacement and left hip injection on September 12th. She returns for hematologic evaluation prior to her surgery. She had decided to continue on Lovenox injections for her anticoagulation rather than switch to Coumadin. She remains on Lovenox 100 mg. twice daily. Denies fever, chills, or sweats. \par \par \par

## 2023-10-10 ENCOUNTER — ANESTHESIA (OUTPATIENT)
Dept: OPERATING ROOM | Age: 63
End: 2023-10-10
Payer: COMMERCIAL

## 2023-10-10 ENCOUNTER — ANESTHESIA EVENT (OUTPATIENT)
Dept: OPERATING ROOM | Age: 63
End: 2023-10-10
Payer: COMMERCIAL

## 2023-10-10 ENCOUNTER — HOSPITAL ENCOUNTER (INPATIENT)
Age: 63
LOS: 15 days | Discharge: NURSING, REHAB OR LONG-TERM CARE | DRG: 329 | End: 2023-10-25
Attending: SURGERY | Admitting: SURGERY
Payer: COMMERCIAL

## 2023-10-10 DIAGNOSIS — F19.10 SUBSTANCE ABUSE (HCC): ICD-10-CM

## 2023-10-10 DIAGNOSIS — Z98.890 STATUS POST REVERSAL OF ILEOSTOMY: Primary | ICD-10-CM

## 2023-10-10 DIAGNOSIS — K63.1 COLON PERFORATION (HCC): ICD-10-CM

## 2023-10-10 DIAGNOSIS — Z93.2 PRESENCE OF ILEOSTOMY (HCC): ICD-10-CM

## 2023-10-10 LAB
A1 LECTIN: NEGATIVE
ABO + RH BLD: NORMAL
ANION GAP SERPL CALCULATED.3IONS-SCNC: 13 MMOL/L (ref 9–17)
ARM BAND NUMBER: NORMAL
BLOOD BANK SAMPLE EXPIRATION: NORMAL
BLOOD GROUP ANTIBODIES SERPL: NEGATIVE
BUN SERPL-MCNC: 20 MG/DL (ref 8–23)
CALCIUM SERPL-MCNC: 8.3 MG/DL (ref 8.6–10.4)
CHLORIDE SERPL-SCNC: 100 MMOL/L (ref 98–107)
CO2 SERPL-SCNC: 22 MMOL/L (ref 20–31)
CREAT SERPL-MCNC: 1.1 MG/DL (ref 0.7–1.2)
ERYTHROCYTE [DISTWIDTH] IN BLOOD BY AUTOMATED COUNT: 12.5 % (ref 11.8–14.4)
GFR SERPL CREATININE-BSD FRML MDRD: >60 ML/MIN/1.73M2
GLUCOSE SERPL-MCNC: 96 MG/DL (ref 70–99)
HCT VFR BLD AUTO: 33.7 % (ref 40.7–50.3)
HCT VFR BLD AUTO: 35.8 % (ref 40.7–50.3)
HGB BLD-MCNC: 11.7 G/DL (ref 13–17)
HGB BLD-MCNC: 11.8 G/DL (ref 13–17)
INR PPP: 1.3
MCH RBC QN AUTO: 33.1 PG (ref 25.2–33.5)
MCHC RBC AUTO-ENTMCNC: 33 G/DL (ref 28.4–34.8)
MCV RBC AUTO: 100.3 FL (ref 82.6–102.9)
NRBC BLD-RTO: 0 PER 100 WBC
PLATELET # BLD AUTO: ABNORMAL K/UL (ref 138–453)
PLATELET, FLUORESCENCE: ABNORMAL K/UL (ref 138–453)
POTASSIUM SERPL-SCNC: 3.1 MMOL/L (ref 3.7–5.3)
PROTHROMBIN TIME: 16 SEC (ref 11.7–14.9)
RBC # BLD AUTO: 3.57 M/UL (ref 4.21–5.77)
REASON FOR REJECTION: NORMAL
REASON FOR REJECTION: NORMAL
SODIUM SERPL-SCNC: 135 MMOL/L (ref 135–144)
SPECIMEN SOURCE: NORMAL
SPECIMEN SOURCE: NORMAL
WBC OTHER # BLD: 8 K/UL (ref 3.5–11.3)
ZZ NTE CLEAN UP: ORDERED TEST: NORMAL
ZZ NTE CLEAN UP: ORDERED TEST: NORMAL

## 2023-10-10 PROCEDURE — 6360000002 HC RX W HCPCS

## 2023-10-10 PROCEDURE — 1200000000 HC SEMI PRIVATE

## 2023-10-10 PROCEDURE — 86850 RBC ANTIBODY SCREEN: CPT

## 2023-10-10 PROCEDURE — 85018 HEMOGLOBIN: CPT

## 2023-10-10 PROCEDURE — 2500000003 HC RX 250 WO HCPCS

## 2023-10-10 PROCEDURE — 6360000002 HC RX W HCPCS: Performed by: STUDENT IN AN ORGANIZED HEALTH CARE EDUCATION/TRAINING PROGRAM

## 2023-10-10 PROCEDURE — 86900 BLOOD TYPING SEROLOGIC ABO: CPT

## 2023-10-10 PROCEDURE — 3600000014 HC SURGERY LEVEL 4 ADDTL 15MIN: Performed by: SURGERY

## 2023-10-10 PROCEDURE — 0DNU0ZZ RELEASE OMENTUM, OPEN APPROACH: ICD-10-PCS | Performed by: SURGERY

## 2023-10-10 PROCEDURE — 6370000000 HC RX 637 (ALT 250 FOR IP): Performed by: STUDENT IN AN ORGANIZED HEALTH CARE EDUCATION/TRAINING PROGRAM

## 2023-10-10 PROCEDURE — 36415 COLL VENOUS BLD VENIPUNCTURE: CPT

## 2023-10-10 PROCEDURE — 2500000003 HC RX 250 WO HCPCS: Performed by: STUDENT IN AN ORGANIZED HEALTH CARE EDUCATION/TRAINING PROGRAM

## 2023-10-10 PROCEDURE — 86901 BLOOD TYPING SEROLOGIC RH(D): CPT

## 2023-10-10 PROCEDURE — 7100000001 HC PACU RECOVERY - ADDTL 15 MIN: Performed by: SURGERY

## 2023-10-10 PROCEDURE — 0DN80ZZ RELEASE SMALL INTESTINE, OPEN APPROACH: ICD-10-PCS | Performed by: SURGERY

## 2023-10-10 PROCEDURE — 6370000000 HC RX 637 (ALT 250 FOR IP)

## 2023-10-10 PROCEDURE — 2580000003 HC RX 258

## 2023-10-10 PROCEDURE — 7100000000 HC PACU RECOVERY - FIRST 15 MIN: Performed by: SURGERY

## 2023-10-10 PROCEDURE — 2580000003 HC RX 258: Performed by: STUDENT IN AN ORGANIZED HEALTH CARE EDUCATION/TRAINING PROGRAM

## 2023-10-10 PROCEDURE — 85055 RETICULATED PLATELET ASSAY: CPT

## 2023-10-10 PROCEDURE — 2580000003 HC RX 258: Performed by: SURGERY

## 2023-10-10 PROCEDURE — 3700000001 HC ADD 15 MINUTES (ANESTHESIA): Performed by: SURGERY

## 2023-10-10 PROCEDURE — 80048 BASIC METABOLIC PNL TOTAL CA: CPT

## 2023-10-10 PROCEDURE — 0DBB0ZZ EXCISION OF ILEUM, OPEN APPROACH: ICD-10-PCS | Performed by: SURGERY

## 2023-10-10 PROCEDURE — 85610 PROTHROMBIN TIME: CPT

## 2023-10-10 PROCEDURE — 85027 COMPLETE CBC AUTOMATED: CPT

## 2023-10-10 PROCEDURE — 88304 TISSUE EXAM BY PATHOLOGIST: CPT

## 2023-10-10 PROCEDURE — 6360000002 HC RX W HCPCS: Performed by: ANESTHESIOLOGY

## 2023-10-10 PROCEDURE — 3600000004 HC SURGERY LEVEL 4 BASE: Performed by: SURGERY

## 2023-10-10 PROCEDURE — 3700000000 HC ANESTHESIA ATTENDED CARE: Performed by: SURGERY

## 2023-10-10 PROCEDURE — 2720000010 HC SURG SUPPLY STERILE: Performed by: SURGERY

## 2023-10-10 PROCEDURE — 6360000002 HC RX W HCPCS: Performed by: SURGERY

## 2023-10-10 PROCEDURE — 2709999900 HC NON-CHARGEABLE SUPPLY: Performed by: SURGERY

## 2023-10-10 PROCEDURE — 83036 HEMOGLOBIN GLYCOSYLATED A1C: CPT

## 2023-10-10 PROCEDURE — 85014 HEMATOCRIT: CPT

## 2023-10-10 RX ORDER — NADOLOL 20 MG/1
20 TABLET ORAL DAILY
Status: DISCONTINUED | OUTPATIENT
Start: 2023-10-11 | End: 2023-10-25 | Stop reason: HOSPADM

## 2023-10-10 RX ORDER — DEXAMETHASONE SODIUM PHOSPHATE 10 MG/ML
INJECTION INTRAMUSCULAR; INTRAVENOUS PRN
Status: DISCONTINUED | OUTPATIENT
Start: 2023-10-10 | End: 2023-10-10 | Stop reason: SDUPTHER

## 2023-10-10 RX ORDER — SCOLOPAMINE TRANSDERMAL SYSTEM 1 MG/1
1 PATCH, EXTENDED RELEASE TRANSDERMAL
Status: DISCONTINUED | OUTPATIENT
Start: 2023-10-10 | End: 2023-10-11

## 2023-10-10 RX ORDER — LABETALOL HYDROCHLORIDE 5 MG/ML
10 INJECTION, SOLUTION INTRAVENOUS ONCE
Status: COMPLETED | OUTPATIENT
Start: 2023-10-10 | End: 2023-10-10

## 2023-10-10 RX ORDER — SODIUM CHLORIDE 9 MG/ML
INJECTION, SOLUTION INTRAVENOUS PRN
Status: DISCONTINUED | OUTPATIENT
Start: 2023-10-10 | End: 2023-10-10 | Stop reason: HOSPADM

## 2023-10-10 RX ORDER — HYDRALAZINE HYDROCHLORIDE 20 MG/ML
10 INJECTION INTRAMUSCULAR; INTRAVENOUS ONCE
Status: COMPLETED | OUTPATIENT
Start: 2023-10-10 | End: 2023-10-10

## 2023-10-10 RX ORDER — PROPOFOL 10 MG/ML
INJECTION, EMULSION INTRAVENOUS PRN
Status: DISCONTINUED | OUTPATIENT
Start: 2023-10-10 | End: 2023-10-10 | Stop reason: SDUPTHER

## 2023-10-10 RX ORDER — ONDANSETRON 4 MG/1
4 TABLET, ORALLY DISINTEGRATING ORAL EVERY 8 HOURS PRN
Status: DISCONTINUED | OUTPATIENT
Start: 2023-10-10 | End: 2023-10-25 | Stop reason: HOSPADM

## 2023-10-10 RX ORDER — SODIUM CHLORIDE 0.9 % (FLUSH) 0.9 %
5-40 SYRINGE (ML) INJECTION PRN
Status: DISCONTINUED | OUTPATIENT
Start: 2023-10-10 | End: 2023-10-10 | Stop reason: HOSPADM

## 2023-10-10 RX ORDER — ONDANSETRON 2 MG/ML
INJECTION INTRAMUSCULAR; INTRAVENOUS
Status: COMPLETED
Start: 2023-10-10 | End: 2023-10-10

## 2023-10-10 RX ORDER — OXYCODONE HYDROCHLORIDE 5 MG/1
5 TABLET ORAL EVERY 6 HOURS PRN
Status: DISCONTINUED | OUTPATIENT
Start: 2023-10-10 | End: 2023-10-18

## 2023-10-10 RX ORDER — MAGNESIUM HYDROXIDE 1200 MG/15ML
LIQUID ORAL PRN
Status: DISCONTINUED | OUTPATIENT
Start: 2023-10-10 | End: 2023-10-10 | Stop reason: HOSPADM

## 2023-10-10 RX ORDER — SODIUM CHLORIDE 0.9 % (FLUSH) 0.9 %
5-40 SYRINGE (ML) INJECTION PRN
Status: DISCONTINUED | OUTPATIENT
Start: 2023-10-10 | End: 2023-10-25 | Stop reason: HOSPADM

## 2023-10-10 RX ORDER — FUROSEMIDE 20 MG/1
20 TABLET ORAL DAILY
Status: DISCONTINUED | OUTPATIENT
Start: 2023-10-11 | End: 2023-10-11

## 2023-10-10 RX ORDER — FENTANYL CITRATE 50 UG/ML
50 INJECTION, SOLUTION INTRAMUSCULAR; INTRAVENOUS
Status: DISCONTINUED | OUTPATIENT
Start: 2023-10-10 | End: 2023-10-11

## 2023-10-10 RX ORDER — SODIUM CHLORIDE 9 MG/ML
INJECTION, SOLUTION INTRAVENOUS PRN
Status: DISCONTINUED | OUTPATIENT
Start: 2023-10-10 | End: 2023-10-12

## 2023-10-10 RX ORDER — DEXTROSE MONOHYDRATE, SODIUM CHLORIDE, AND POTASSIUM CHLORIDE 50; 1.49; 4.5 G/1000ML; G/1000ML; G/1000ML
INJECTION, SOLUTION INTRAVENOUS CONTINUOUS
Status: DISCONTINUED | OUTPATIENT
Start: 2023-10-10 | End: 2023-10-20

## 2023-10-10 RX ORDER — GABAPENTIN 300 MG/1
300 CAPSULE ORAL EVERY 8 HOURS SCHEDULED
Status: DISCONTINUED | OUTPATIENT
Start: 2023-10-10 | End: 2023-10-12

## 2023-10-10 RX ORDER — METHOCARBAMOL 750 MG/1
750 TABLET, FILM COATED ORAL EVERY 6 HOURS
Status: DISCONTINUED | OUTPATIENT
Start: 2023-10-10 | End: 2023-10-12

## 2023-10-10 RX ORDER — IPRATROPIUM BROMIDE AND ALBUTEROL SULFATE 2.5; .5 MG/3ML; MG/3ML
1 SOLUTION RESPIRATORY (INHALATION)
Status: DISCONTINUED | OUTPATIENT
Start: 2023-10-10 | End: 2023-10-12

## 2023-10-10 RX ORDER — METRONIDAZOLE 500 MG/100ML
500 INJECTION, SOLUTION INTRAVENOUS EVERY 8 HOURS
Status: COMPLETED | OUTPATIENT
Start: 2023-10-10 | End: 2023-10-11

## 2023-10-10 RX ORDER — ONDANSETRON 2 MG/ML
4 INJECTION INTRAMUSCULAR; INTRAVENOUS
Status: COMPLETED | OUTPATIENT
Start: 2023-10-10 | End: 2023-10-10

## 2023-10-10 RX ORDER — POTASSIUM CHLORIDE 7.45 MG/ML
10 INJECTION INTRAVENOUS
Status: COMPLETED | OUTPATIENT
Start: 2023-10-10 | End: 2023-10-11

## 2023-10-10 RX ORDER — FENTANYL CITRATE 50 UG/ML
50 INJECTION, SOLUTION INTRAMUSCULAR; INTRAVENOUS
Status: DISCONTINUED | OUTPATIENT
Start: 2023-10-10 | End: 2023-10-10

## 2023-10-10 RX ORDER — PHENYLEPHRINE HCL IN 0.9% NACL 1 MG/10 ML
SYRINGE (ML) INTRAVENOUS PRN
Status: DISCONTINUED | OUTPATIENT
Start: 2023-10-10 | End: 2023-10-10 | Stop reason: SDUPTHER

## 2023-10-10 RX ORDER — FENTANYL CITRATE 50 UG/ML
50 INJECTION, SOLUTION INTRAMUSCULAR; INTRAVENOUS EVERY 5 MIN PRN
Status: DISCONTINUED | OUTPATIENT
Start: 2023-10-10 | End: 2023-10-10 | Stop reason: HOSPADM

## 2023-10-10 RX ORDER — SODIUM CHLORIDE 0.9 % (FLUSH) 0.9 %
5-40 SYRINGE (ML) INJECTION EVERY 12 HOURS SCHEDULED
Status: DISCONTINUED | OUTPATIENT
Start: 2023-10-10 | End: 2023-10-10 | Stop reason: HOSPADM

## 2023-10-10 RX ORDER — FENTANYL CITRATE 50 UG/ML
INJECTION, SOLUTION INTRAMUSCULAR; INTRAVENOUS PRN
Status: DISCONTINUED | OUTPATIENT
Start: 2023-10-10 | End: 2023-10-10 | Stop reason: SDUPTHER

## 2023-10-10 RX ORDER — LIDOCAINE HYDROCHLORIDE 10 MG/ML
INJECTION, SOLUTION EPIDURAL; INFILTRATION; INTRACAUDAL; PERINEURAL PRN
Status: DISCONTINUED | OUTPATIENT
Start: 2023-10-10 | End: 2023-10-10 | Stop reason: SDUPTHER

## 2023-10-10 RX ORDER — ROCURONIUM BROMIDE 10 MG/ML
INJECTION, SOLUTION INTRAVENOUS PRN
Status: DISCONTINUED | OUTPATIENT
Start: 2023-10-10 | End: 2023-10-10 | Stop reason: SDUPTHER

## 2023-10-10 RX ORDER — SODIUM CHLORIDE 0.9 % (FLUSH) 0.9 %
5-40 SYRINGE (ML) INJECTION EVERY 12 HOURS SCHEDULED
Status: DISCONTINUED | OUTPATIENT
Start: 2023-10-10 | End: 2023-10-25 | Stop reason: HOSPADM

## 2023-10-10 RX ORDER — IBUPROFEN 800 MG/1
400 TABLET ORAL EVERY 6 HOURS
Status: CANCELLED | OUTPATIENT
Start: 2023-10-10

## 2023-10-10 RX ORDER — MAGNESIUM HYDROXIDE 1200 MG/15ML
LIQUID ORAL CONTINUOUS PRN
Status: DISCONTINUED | OUTPATIENT
Start: 2023-10-10 | End: 2023-10-10 | Stop reason: HOSPADM

## 2023-10-10 RX ORDER — METRONIDAZOLE 500 MG/100ML
500 INJECTION, SOLUTION INTRAVENOUS ONCE
Status: COMPLETED | OUTPATIENT
Start: 2023-10-10 | End: 2023-10-10

## 2023-10-10 RX ORDER — SPIRONOLACTONE 25 MG/1
25 TABLET ORAL DAILY
Status: DISCONTINUED | OUTPATIENT
Start: 2023-10-11 | End: 2023-10-25 | Stop reason: HOSPADM

## 2023-10-10 RX ORDER — ONDANSETRON 2 MG/ML
INJECTION INTRAMUSCULAR; INTRAVENOUS PRN
Status: DISCONTINUED | OUTPATIENT
Start: 2023-10-10 | End: 2023-10-10 | Stop reason: SDUPTHER

## 2023-10-10 RX ORDER — SODIUM CHLORIDE, SODIUM LACTATE, POTASSIUM CHLORIDE, CALCIUM CHLORIDE 600; 310; 30; 20 MG/100ML; MG/100ML; MG/100ML; MG/100ML
INJECTION, SOLUTION INTRAVENOUS CONTINUOUS PRN
Status: DISCONTINUED | OUTPATIENT
Start: 2023-10-10 | End: 2023-10-10 | Stop reason: SDUPTHER

## 2023-10-10 RX ORDER — PANTOPRAZOLE SODIUM 40 MG/1
40 TABLET, DELAYED RELEASE ORAL DAILY
Status: DISCONTINUED | OUTPATIENT
Start: 2023-10-11 | End: 2023-10-11

## 2023-10-10 RX ORDER — SODIUM CHLORIDE, SODIUM LACTATE, POTASSIUM CHLORIDE, CALCIUM CHLORIDE 600; 310; 30; 20 MG/100ML; MG/100ML; MG/100ML; MG/100ML
INJECTION, SOLUTION INTRAVENOUS CONTINUOUS
Status: DISCONTINUED | OUTPATIENT
Start: 2023-10-10 | End: 2023-10-11

## 2023-10-10 RX ORDER — ONDANSETRON 2 MG/ML
4 INJECTION INTRAMUSCULAR; INTRAVENOUS EVERY 6 HOURS PRN
Status: DISCONTINUED | OUTPATIENT
Start: 2023-10-10 | End: 2023-10-25 | Stop reason: HOSPADM

## 2023-10-10 RX ADMIN — OXYCODONE HYDROCHLORIDE 5 MG: 5 TABLET ORAL at 17:01

## 2023-10-10 RX ADMIN — ONDANSETRON 4 MG: 2 INJECTION INTRAMUSCULAR; INTRAVENOUS at 15:47

## 2023-10-10 RX ADMIN — METHOCARBAMOL TABLETS 750 MG: 750 TABLET, COATED ORAL at 22:36

## 2023-10-10 RX ADMIN — LABETALOL HYDROCHLORIDE 10 MG: 5 INJECTION, SOLUTION INTRAVENOUS at 17:38

## 2023-10-10 RX ADMIN — LABETALOL HYDROCHLORIDE 10 MG: 5 INJECTION, SOLUTION INTRAVENOUS at 18:20

## 2023-10-10 RX ADMIN — HYDROMORPHONE HYDROCHLORIDE 0.25 MG: 1 INJECTION, SOLUTION INTRAMUSCULAR; INTRAVENOUS; SUBCUTANEOUS at 15:51

## 2023-10-10 RX ADMIN — HYDROMORPHONE HYDROCHLORIDE 0.25 MG: 1 INJECTION, SOLUTION INTRAMUSCULAR; INTRAVENOUS; SUBCUTANEOUS at 15:54

## 2023-10-10 RX ADMIN — GABAPENTIN 300 MG: 300 CAPSULE ORAL at 23:19

## 2023-10-10 RX ADMIN — ROCURONIUM BROMIDE 20 MG: 10 INJECTION, SOLUTION INTRAVENOUS at 13:57

## 2023-10-10 RX ADMIN — ROCURONIUM BROMIDE 50 MG: 10 INJECTION, SOLUTION INTRAVENOUS at 13:33

## 2023-10-10 RX ADMIN — FENTANYL CITRATE 50 MCG: 0.05 INJECTION, SOLUTION INTRAMUSCULAR; INTRAVENOUS at 15:37

## 2023-10-10 RX ADMIN — SUGAMMADEX 200 MG: 100 INJECTION, SOLUTION INTRAVENOUS at 15:24

## 2023-10-10 RX ADMIN — FENTANYL CITRATE 50 MCG: 50 INJECTION INTRAMUSCULAR; INTRAVENOUS at 16:56

## 2023-10-10 RX ADMIN — Medication 10 MEQ: at 23:15

## 2023-10-10 RX ADMIN — FENTANYL CITRATE 50 MCG: 50 INJECTION INTRAMUSCULAR; INTRAVENOUS at 16:15

## 2023-10-10 RX ADMIN — HYDROMORPHONE HYDROCHLORIDE 0.5 MG: 1 INJECTION, SOLUTION INTRAMUSCULAR; INTRAVENOUS; SUBCUTANEOUS at 15:41

## 2023-10-10 RX ADMIN — ONDANSETRON 4 MG: 2 INJECTION INTRAMUSCULAR; INTRAVENOUS at 18:19

## 2023-10-10 RX ADMIN — FENTANYL CITRATE 50 MCG: 0.05 INJECTION, SOLUTION INTRAMUSCULAR; INTRAVENOUS at 15:33

## 2023-10-10 RX ADMIN — ONDANSETRON 4 MG: 2 INJECTION INTRAMUSCULAR; INTRAVENOUS at 15:18

## 2023-10-10 RX ADMIN — DEXAMETHASONE SODIUM PHOSPHATE 10 MG: 10 INJECTION INTRAMUSCULAR; INTRAVENOUS at 13:39

## 2023-10-10 RX ADMIN — FENTANYL CITRATE 50 MCG: 0.05 INJECTION, SOLUTION INTRAMUSCULAR; INTRAVENOUS at 14:49

## 2023-10-10 RX ADMIN — METRONIDAZOLE 500 MG: 500 INJECTION, SOLUTION INTRAVENOUS at 23:19

## 2023-10-10 RX ADMIN — NALOXEGOL OXALATE 25 MG: 12.5 TABLET, FILM COATED ORAL at 22:36

## 2023-10-10 RX ADMIN — FENTANYL CITRATE 50 MCG: 50 INJECTION INTRAMUSCULAR; INTRAVENOUS at 16:20

## 2023-10-10 RX ADMIN — Medication 100 MCG: at 13:33

## 2023-10-10 RX ADMIN — OXYCODONE HYDROCHLORIDE 5 MG: 5 TABLET ORAL at 23:15

## 2023-10-10 RX ADMIN — FENTANYL CITRATE 50 MCG: 0.05 INJECTION, SOLUTION INTRAMUSCULAR; INTRAVENOUS at 13:57

## 2023-10-10 RX ADMIN — LIDOCAINE HYDROCHLORIDE 50 MG: 10 INJECTION, SOLUTION EPIDURAL; INFILTRATION; INTRACAUDAL; PERINEURAL at 13:33

## 2023-10-10 RX ADMIN — HYDROMORPHONE HYDROCHLORIDE 0.5 MG: 1 INJECTION, SOLUTION INTRAMUSCULAR; INTRAVENOUS; SUBCUTANEOUS at 18:25

## 2023-10-10 RX ADMIN — Medication 10 MEQ: at 21:43

## 2023-10-10 RX ADMIN — HYDRALAZINE HYDROCHLORIDE 10 MG: 20 INJECTION, SOLUTION INTRAMUSCULAR; INTRAVENOUS at 19:48

## 2023-10-10 RX ADMIN — SODIUM CHLORIDE, POTASSIUM CHLORIDE, SODIUM LACTATE AND CALCIUM CHLORIDE: 600; 310; 30; 20 INJECTION, SOLUTION INTRAVENOUS at 14:37

## 2023-10-10 RX ADMIN — SODIUM CHLORIDE, PRESERVATIVE FREE 10 ML: 5 INJECTION INTRAVENOUS at 22:36

## 2023-10-10 RX ADMIN — Medication 2000 MG: at 13:54

## 2023-10-10 RX ADMIN — SODIUM CHLORIDE, POTASSIUM CHLORIDE, SODIUM LACTATE AND CALCIUM CHLORIDE: 600; 310; 30; 20 INJECTION, SOLUTION INTRAVENOUS at 15:24

## 2023-10-10 RX ADMIN — FENTANYL CITRATE 25 MCG: 0.05 INJECTION, SOLUTION INTRAMUSCULAR; INTRAVENOUS at 15:08

## 2023-10-10 RX ADMIN — HYDROMORPHONE HYDROCHLORIDE 0.5 MG: 1 INJECTION, SOLUTION INTRAMUSCULAR; INTRAVENOUS; SUBCUTANEOUS at 15:47

## 2023-10-10 RX ADMIN — PROPOFOL 150 MG: 10 INJECTION, EMULSION INTRAVENOUS at 13:33

## 2023-10-10 RX ADMIN — METRONIDAZOLE 500 MG: 500 SOLUTION INTRAVENOUS at 13:42

## 2023-10-10 RX ADMIN — FENTANYL CITRATE 25 MCG: 0.05 INJECTION, SOLUTION INTRAMUSCULAR; INTRAVENOUS at 15:25

## 2023-10-10 RX ADMIN — DEXTROSE MONOHYDRATE, SODIUM CHLORIDE, AND POTASSIUM CHLORIDE: 50; 4.5; 1.49 INJECTION, SOLUTION INTRAVENOUS at 21:35

## 2023-10-10 RX ADMIN — Medication 2000 MG: at 22:26

## 2023-10-10 RX ADMIN — SODIUM CHLORIDE, POTASSIUM CHLORIDE, SODIUM LACTATE AND CALCIUM CHLORIDE: 600; 310; 30; 20 INJECTION, SOLUTION INTRAVENOUS at 13:26

## 2023-10-10 RX ADMIN — FENTANYL CITRATE 100 MCG: 0.05 INJECTION, SOLUTION INTRAMUSCULAR; INTRAVENOUS at 13:33

## 2023-10-10 ASSESSMENT — PAIN DESCRIPTION - LOCATION
LOCATION: ABDOMEN

## 2023-10-10 ASSESSMENT — PAIN - FUNCTIONAL ASSESSMENT: PAIN_FUNCTIONAL_ASSESSMENT: NONE - DENIES PAIN

## 2023-10-10 ASSESSMENT — PAIN SCALES - GENERAL
PAINLEVEL_OUTOF10: 10
PAINLEVEL_OUTOF10: 9
PAINLEVEL_OUTOF10: 7
PAINLEVEL_OUTOF10: 10
PAINLEVEL_OUTOF10: 8
PAINLEVEL_OUTOF10: 10
PAINLEVEL_OUTOF10: 9

## 2023-10-10 ASSESSMENT — PAIN DESCRIPTION - PAIN TYPE: TYPE: SURGICAL PAIN

## 2023-10-10 ASSESSMENT — LIFESTYLE VARIABLES: SMOKING_STATUS: 1

## 2023-10-10 ASSESSMENT — PAIN DESCRIPTION - ORIENTATION: ORIENTATION: LOWER

## 2023-10-10 ASSESSMENT — PAIN DESCRIPTION - DESCRIPTORS
DESCRIPTORS: ACHING;BURNING
DESCRIPTORS: ACHING;BURNING;STABBING
DESCRIPTORS: ACHING
DESCRIPTORS: ACHING

## 2023-10-10 ASSESSMENT — ENCOUNTER SYMPTOMS: SHORTNESS OF BREATH: 1

## 2023-10-10 ASSESSMENT — PAIN DESCRIPTION - FREQUENCY: FREQUENCY: CONTINUOUS

## 2023-10-10 NOTE — BRIEF OP NOTE
Brief Postoperative Note      Patient: Micheal Pearl  YOB: 1960  MRN: [id number]    Date of Procedure: 10/10/2023    Pre-Op Diagnosis Codes:     * Presence of ileostomy ([address]) [Z93.2]     * Colon perforation ([address]) [K63.1]    Post-Op Diagnosis: Same       Procedure(s):  EXPLORATORY LAPAROTOMY, REVERSAL ILEOSTOMY WITH ILEOCOLOSTOMY, LYSIS OF ADHESIONS,    Surgeon(s):  Jennifer Lopez DO    Assistant:  Resident: Hakeem Ramirez DO    Anesthesia: General    Estimated Blood Loss (mL): 69XB    Complications: None    Specimens:   ID Type Source Tests Collected by Time Destination   A : ILEOSTOMY SITE Tissue Ileum SURGICAL PATHOLOGY Jennifer Lopez DO 10/10/2023 1423        Implants:  * No implants in log *      Drains:   Ileostomy/Jejunostomy RLQ Ileostomy (Active)       Urinary Catheter 10/10/23 2 Way; Neal (Active)       Findings: WC-II, take down of end ileostomy and creation of ileocolonic primary stapled anastomosis.        Electronically signed by Hakeem Ramirez DO on 10/10/2023 at 3:51 PM

## 2023-10-10 NOTE — OP NOTE
of the midline and stoma site were then loosely approximated with staples with half-inch iodoform pack placed between. The abdomen was then washed, dried, and dressings applied consisting of 4 x 4 gauze, ABD pads, and tape. This concluded our procedure. The patient tolerated it well and there were no complications. The patient was then transferred back to his hospital bed where he was extubated and awakened per anesthesia. The patient was taken to PACU in stable condition. All sponge, needle, and instrument counts reported as correct at the end of the case. Dr. Rina Aragon was present scrubbed for the entirety of the procedure. Electronically signed by Gilda Lewis DO on 10/10/2023 at 3:52 PM  I attest that I was with the resident during the patient's operation and agree with the description of findings and procedure as dictated above.   Pooja Guido MD

## 2023-10-11 DIAGNOSIS — K74.69 DECOMPENSATED LIVER DISEASE (HCC): ICD-10-CM

## 2023-10-11 LAB
ALBUMIN SERPL-MCNC: 2.7 G/DL (ref 3.5–5.2)
ALBUMIN/GLOB SERPL: 0.9 {RATIO} (ref 1–2.5)
ALP SERPL-CCNC: 99 U/L (ref 40–129)
ALT SERPL-CCNC: 12 U/L (ref 5–41)
AMMONIA PLAS-SCNC: 51 UMOL/L (ref 16–60)
AMMONIA PLAS-SCNC: 67 UMOL/L (ref 16–60)
ANION GAP SERPL CALCULATED.3IONS-SCNC: 12 MMOL/L (ref 9–17)
AST SERPL-CCNC: 46 U/L
BASOPHILS # BLD: 0 K/UL (ref 0–0.2)
BASOPHILS NFR BLD: 0 % (ref 0–2)
BILIRUB DIRECT SERPL-MCNC: 0.9 MG/DL
BILIRUB INDIRECT SERPL-MCNC: 1 MG/DL (ref 0–1)
BILIRUB SERPL-MCNC: 1.9 MG/DL (ref 0.3–1.2)
BUN SERPL-MCNC: 24 MG/DL (ref 8–23)
CALCIUM SERPL-MCNC: 7.9 MG/DL (ref 8.6–10.4)
CHLORIDE SERPL-SCNC: 101 MMOL/L (ref 98–107)
CO2 SERPL-SCNC: 21 MMOL/L (ref 20–31)
CREAT SERPL-MCNC: 1.2 MG/DL (ref 0.7–1.2)
EOSINOPHIL # BLD: 0 K/UL (ref 0–0.4)
EOSINOPHILS RELATIVE PERCENT: 0 % (ref 1–4)
ERYTHROCYTE [DISTWIDTH] IN BLOOD BY AUTOMATED COUNT: 12.6 % (ref 11.8–14.4)
EST. AVERAGE GLUCOSE BLD GHB EST-MCNC: 54 MG/DL
GFR SERPL CREATININE-BSD FRML MDRD: >60 ML/MIN/1.73M2
GLUCOSE BLD-MCNC: 108 MG/DL (ref 75–110)
GLUCOSE BLD-MCNC: 116 MG/DL (ref 75–110)
GLUCOSE BLD-MCNC: 129 MG/DL (ref 75–110)
GLUCOSE BLD-MCNC: 186 MG/DL (ref 75–110)
GLUCOSE SERPL-MCNC: 215 MG/DL (ref 70–99)
HBA1C MFR BLD: 3.5 % (ref 4–6)
HCT VFR BLD AUTO: 30.4 % (ref 40.7–50.3)
HGB BLD-MCNC: 10.5 G/DL (ref 13–17)
IMM GRANULOCYTES # BLD AUTO: 0 K/UL (ref 0–0.3)
IMM GRANULOCYTES NFR BLD: 0 %
LYMPHOCYTES NFR BLD: 0.37 K/UL (ref 1–4.8)
LYMPHOCYTES RELATIVE PERCENT: 3 % (ref 24–44)
MAGNESIUM SERPL-MCNC: 0.9 MG/DL (ref 1.6–2.6)
MAGNESIUM SERPL-MCNC: 1.9 MG/DL (ref 1.6–2.6)
MCH RBC QN AUTO: 34.1 PG (ref 25.2–33.5)
MCHC RBC AUTO-ENTMCNC: 34.5 G/DL (ref 28.4–34.8)
MCV RBC AUTO: 98.7 FL (ref 82.6–102.9)
MONOCYTES NFR BLD: 0.62 K/UL (ref 0.1–0.8)
MONOCYTES NFR BLD: 5 % (ref 1–7)
MORPHOLOGY: NORMAL
NEUTROPHILS NFR BLD: 92 % (ref 36–66)
NEUTS SEG NFR BLD: 11.31 K/UL (ref 1.8–7.7)
NRBC BLD-RTO: 0 PER 100 WBC
PHOSPHATE SERPL-MCNC: 2.9 MG/DL (ref 2.5–4.5)
PLATELET # BLD AUTO: ABNORMAL K/UL (ref 138–453)
PLATELET, FLUORESCENCE: 92 K/UL (ref 138–453)
PLATELETS.RETICULATED NFR BLD AUTO: 3.9 % (ref 1.1–10.3)
POTASSIUM SERPL-SCNC: 3.8 MMOL/L (ref 3.7–5.3)
PROT SERPL-MCNC: 5.6 G/DL (ref 6.4–8.3)
RBC # BLD AUTO: 3.08 M/UL (ref 4.21–5.77)
REASON FOR REJECTION: NORMAL
SODIUM SERPL-SCNC: 134 MMOL/L (ref 135–144)
SPECIMEN SOURCE: NORMAL
WBC OTHER # BLD: 12.3 K/UL (ref 3.5–11.3)
ZZ NTE CLEAN UP: ORDERED TEST: NORMAL

## 2023-10-11 PROCEDURE — 80048 BASIC METABOLIC PNL TOTAL CA: CPT

## 2023-10-11 PROCEDURE — 2580000003 HC RX 258: Performed by: STUDENT IN AN ORGANIZED HEALTH CARE EDUCATION/TRAINING PROGRAM

## 2023-10-11 PROCEDURE — 84100 ASSAY OF PHOSPHORUS: CPT

## 2023-10-11 PROCEDURE — 85025 COMPLETE CBC W/AUTO DIFF WBC: CPT

## 2023-10-11 PROCEDURE — 2580000003 HC RX 258: Performed by: SURGERY

## 2023-10-11 PROCEDURE — 6370000000 HC RX 637 (ALT 250 FOR IP): Performed by: STUDENT IN AN ORGANIZED HEALTH CARE EDUCATION/TRAINING PROGRAM

## 2023-10-11 PROCEDURE — 1200000000 HC SEMI PRIVATE

## 2023-10-11 PROCEDURE — 97116 GAIT TRAINING THERAPY: CPT

## 2023-10-11 PROCEDURE — 6360000002 HC RX W HCPCS: Performed by: STUDENT IN AN ORGANIZED HEALTH CARE EDUCATION/TRAINING PROGRAM

## 2023-10-11 PROCEDURE — 2500000003 HC RX 250 WO HCPCS: Performed by: STUDENT IN AN ORGANIZED HEALTH CARE EDUCATION/TRAINING PROGRAM

## 2023-10-11 PROCEDURE — 6360000002 HC RX W HCPCS: Performed by: SURGERY

## 2023-10-11 PROCEDURE — 2500000003 HC RX 250 WO HCPCS: Performed by: SURGERY

## 2023-10-11 PROCEDURE — 36415 COLL VENOUS BLD VENIPUNCTURE: CPT

## 2023-10-11 PROCEDURE — 82140 ASSAY OF AMMONIA: CPT

## 2023-10-11 PROCEDURE — 82947 ASSAY GLUCOSE BLOOD QUANT: CPT

## 2023-10-11 PROCEDURE — 85055 RETICULATED PLATELET ASSAY: CPT

## 2023-10-11 PROCEDURE — 97163 PT EVAL HIGH COMPLEX 45 MIN: CPT

## 2023-10-11 PROCEDURE — 83735 ASSAY OF MAGNESIUM: CPT

## 2023-10-11 PROCEDURE — 80076 HEPATIC FUNCTION PANEL: CPT

## 2023-10-11 RX ORDER — POTASSIUM CHLORIDE 20 MEQ/1
40 TABLET, EXTENDED RELEASE ORAL PRN
Status: DISCONTINUED | OUTPATIENT
Start: 2023-10-11 | End: 2023-10-25 | Stop reason: HOSPADM

## 2023-10-11 RX ORDER — ENOXAPARIN SODIUM 100 MG/ML
40 INJECTION SUBCUTANEOUS DAILY
Status: DISCONTINUED | OUTPATIENT
Start: 2023-10-11 | End: 2023-10-25 | Stop reason: HOSPADM

## 2023-10-11 RX ORDER — DEXTROSE MONOHYDRATE 100 MG/ML
INJECTION, SOLUTION INTRAVENOUS CONTINUOUS PRN
Status: DISCONTINUED | OUTPATIENT
Start: 2023-10-11 | End: 2023-10-25 | Stop reason: HOSPADM

## 2023-10-11 RX ORDER — METOCLOPRAMIDE HYDROCHLORIDE 5 MG/ML
10 INJECTION INTRAMUSCULAR; INTRAVENOUS EVERY 6 HOURS
Status: COMPLETED | OUTPATIENT
Start: 2023-10-11 | End: 2023-10-14

## 2023-10-11 RX ORDER — INSULIN LISPRO 100 [IU]/ML
0-16 INJECTION, SOLUTION INTRAVENOUS; SUBCUTANEOUS EVERY 4 HOURS
Status: DISCONTINUED | OUTPATIENT
Start: 2023-10-11 | End: 2023-10-19

## 2023-10-11 RX ORDER — BISACODYL 10 MG
10 SUPPOSITORY, RECTAL RECTAL DAILY
Status: DISPENSED | OUTPATIENT
Start: 2023-10-11 | End: 2023-10-14

## 2023-10-11 RX ORDER — MAGNESIUM SULFATE HEPTAHYDRATE 40 MG/ML
4000 INJECTION, SOLUTION INTRAVENOUS ONCE
Status: COMPLETED | OUTPATIENT
Start: 2023-10-11 | End: 2023-10-11

## 2023-10-11 RX ORDER — FUROSEMIDE 10 MG/ML
20 INJECTION INTRAMUSCULAR; INTRAVENOUS DAILY
Status: DISCONTINUED | OUTPATIENT
Start: 2023-10-11 | End: 2023-10-21

## 2023-10-11 RX ORDER — PROMETHAZINE HYDROCHLORIDE 25 MG/1
25 SUPPOSITORY RECTAL EVERY 6 HOURS PRN
Status: DISCONTINUED | OUTPATIENT
Start: 2023-10-11 | End: 2023-10-25 | Stop reason: HOSPADM

## 2023-10-11 RX ORDER — POTASSIUM CHLORIDE 7.45 MG/ML
10 INJECTION INTRAVENOUS PRN
Status: DISCONTINUED | OUTPATIENT
Start: 2023-10-11 | End: 2023-10-25 | Stop reason: HOSPADM

## 2023-10-11 RX ADMIN — DEXTROSE MONOHYDRATE, SODIUM CHLORIDE, AND POTASSIUM CHLORIDE: 50; 4.5; 1.49 INJECTION, SOLUTION INTRAVENOUS at 10:28

## 2023-10-11 RX ADMIN — ONDANSETRON 4 MG: 2 INJECTION INTRAMUSCULAR; INTRAVENOUS at 01:12

## 2023-10-11 RX ADMIN — Medication 2000 MG: at 05:54

## 2023-10-11 RX ADMIN — METOCLOPRAMIDE 10 MG: 5 INJECTION, SOLUTION INTRAMUSCULAR; INTRAVENOUS at 21:15

## 2023-10-11 RX ADMIN — HYDROMORPHONE HYDROCHLORIDE 0.5 MG: 1 INJECTION, SOLUTION INTRAMUSCULAR; INTRAVENOUS; SUBCUTANEOUS at 02:30

## 2023-10-11 RX ADMIN — METOCLOPRAMIDE 10 MG: 5 INJECTION, SOLUTION INTRAMUSCULAR; INTRAVENOUS at 15:45

## 2023-10-11 RX ADMIN — SODIUM CHLORIDE, PRESERVATIVE FREE 10 ML: 5 INJECTION INTRAVENOUS at 09:51

## 2023-10-11 RX ADMIN — Medication 10 MEQ: at 01:25

## 2023-10-11 RX ADMIN — SPIRONOLACTONE 25 MG: 25 TABLET ORAL at 09:49

## 2023-10-11 RX ADMIN — Medication 10 MEQ: at 00:18

## 2023-10-11 RX ADMIN — Medication 2000 MG: at 13:35

## 2023-10-11 RX ADMIN — SODIUM CHLORIDE, PRESERVATIVE FREE 20 MG: 5 INJECTION INTRAVENOUS at 09:50

## 2023-10-11 RX ADMIN — METHOCARBAMOL TABLETS 750 MG: 750 TABLET, COATED ORAL at 03:58

## 2023-10-11 RX ADMIN — ENOXAPARIN SODIUM 40 MG: 100 INJECTION SUBCUTANEOUS at 13:36

## 2023-10-11 RX ADMIN — METRONIDAZOLE 500 MG: 500 INJECTION, SOLUTION INTRAVENOUS at 05:55

## 2023-10-11 RX ADMIN — METHOCARBAMOL TABLETS 750 MG: 750 TABLET, COATED ORAL at 09:49

## 2023-10-11 RX ADMIN — HYDROMORPHONE HYDROCHLORIDE 0.5 MG: 1 INJECTION, SOLUTION INTRAMUSCULAR; INTRAVENOUS; SUBCUTANEOUS at 07:20

## 2023-10-11 RX ADMIN — GABAPENTIN 300 MG: 300 CAPSULE ORAL at 21:16

## 2023-10-11 RX ADMIN — NADOLOL 20 MG: 20 TABLET ORAL at 09:50

## 2023-10-11 RX ADMIN — MAGNESIUM SULFATE HEPTAHYDRATE 4000 MG: 40 INJECTION, SOLUTION INTRAVENOUS at 10:30

## 2023-10-11 RX ADMIN — METHOCARBAMOL TABLETS 750 MG: 750 TABLET, COATED ORAL at 21:15

## 2023-10-11 RX ADMIN — METHOCARBAMOL TABLETS 750 MG: 750 TABLET, COATED ORAL at 13:35

## 2023-10-11 RX ADMIN — NALOXEGOL OXALATE 25 MG: 12.5 TABLET, FILM COATED ORAL at 05:57

## 2023-10-11 RX ADMIN — FUROSEMIDE 20 MG: 10 INJECTION, SOLUTION INTRAMUSCULAR; INTRAVENOUS at 09:50

## 2023-10-11 RX ADMIN — OXYCODONE HYDROCHLORIDE 5 MG: 5 TABLET ORAL at 11:25

## 2023-10-11 RX ADMIN — HYDROMORPHONE HYDROCHLORIDE 0.5 MG: 1 INJECTION, SOLUTION INTRAMUSCULAR; INTRAVENOUS; SUBCUTANEOUS at 22:36

## 2023-10-11 RX ADMIN — FENTANYL CITRATE 50 MCG: 50 INJECTION INTRAMUSCULAR; INTRAVENOUS at 01:06

## 2023-10-11 RX ADMIN — OXYCODONE HYDROCHLORIDE 5 MG: 5 TABLET ORAL at 18:13

## 2023-10-11 RX ADMIN — SODIUM CHLORIDE, PRESERVATIVE FREE 20 MG: 5 INJECTION INTRAVENOUS at 21:15

## 2023-10-11 RX ADMIN — HYDROMORPHONE HYDROCHLORIDE 0.5 MG: 1 INJECTION, SOLUTION INTRAMUSCULAR; INTRAVENOUS; SUBCUTANEOUS at 13:38

## 2023-10-11 RX ADMIN — GABAPENTIN 300 MG: 300 CAPSULE ORAL at 13:35

## 2023-10-11 RX ADMIN — METRONIDAZOLE 500 MG: 500 INJECTION, SOLUTION INTRAVENOUS at 15:39

## 2023-10-11 RX ADMIN — METOCLOPRAMIDE 10 MG: 5 INJECTION, SOLUTION INTRAMUSCULAR; INTRAVENOUS at 09:50

## 2023-10-11 RX ADMIN — GABAPENTIN 300 MG: 300 CAPSULE ORAL at 05:57

## 2023-10-11 ASSESSMENT — PAIN SCALES - GENERAL
PAINLEVEL_OUTOF10: 10
PAINLEVEL_OUTOF10: 9
PAINLEVEL_OUTOF10: 10
PAINLEVEL_OUTOF10: 8
PAINLEVEL_OUTOF10: 10
PAINLEVEL_OUTOF10: 8

## 2023-10-11 ASSESSMENT — PAIN DESCRIPTION - DESCRIPTORS
DESCRIPTORS: ACHING

## 2023-10-11 ASSESSMENT — PAIN DESCRIPTION - LOCATION
LOCATION: ABDOMEN

## 2023-10-11 ASSESSMENT — PAIN DESCRIPTION - ORIENTATION: ORIENTATION: LOWER

## 2023-10-11 NOTE — CARE COORDINATION
Case Management Assessment  Initial Evaluation    Date/Time of Evaluation: 10/11/2023 4:12 PM  Assessment Completed by: Randolph Grant RN    If patient is discharged prior to next notation, then this note serves as note for discharge by case management. Patient Name: Rudolph Horner                   YOB: 1960  Diagnosis: Presence of ileostomy [facility]) [Z93.2]  Colon perforation ([address]) [K63.1]  Status post reversal of ileostomy [Z98.890]                   Date / Time: 10/10/2023 10:45 AM    Patient Admission Status: Inpatient   Readmission Risk (Low < 19, Mod (19-27), High > 27): Readmission Risk Score: 30.1    Current PCP: VASU Hayes  PCP verified by CM? Yes Carmina LEONE)    Chart Reviewed: Yes      History Provided by: Patient  Patient Orientation: Alert and Oriented    Patient Cognition: Alert    Hospitalization in the last 30 days (Readmission):  No    If yes, Readmission Assessment in CM Navigator will be completed. Advance Directives:      Code Status: Full Code   Patient's Primary Decision Maker is: Legal Next of Kin    Primary Decision Maker: Shira Ojeda - [facility] - [phone]    Discharge Planning:    Patient lives with: Family Members Type of Home: House  Primary Care Giver: Self  Patient Support Systems include: Family Members   Current Financial resources: Medicare, Medicaid  Current community resources: None  Current services prior to admission: None            Current DME:  joaquin chowdhury            Type of Home Care services:  None    ADLS  Prior functional level: Independent in ADLs/IADLs  Current functional level: Independent in ADLs/IADLs    PT AM-PAC:   /24  OT AM-PAC:   /24    Family can provide assistance at DC: Yes  Would you like Case Management to discuss the discharge plan with any other family members/significant others, and if so, who?     Plans to Return to Present Housing: Yes  Other Identified Issues/Barriers to RETURNING to current housing:

## 2023-10-12 LAB
ALBUMIN SERPL-MCNC: 2.7 G/DL (ref 3.5–5.2)
ALBUMIN/GLOB SERPL: 0.9 {RATIO} (ref 1–2.5)
ALP SERPL-CCNC: 93 U/L (ref 40–129)
ALT SERPL-CCNC: 11 U/L (ref 5–41)
AMMONIA PLAS-SCNC: 52 UMOL/L (ref 16–60)
ANION GAP SERPL CALCULATED.3IONS-SCNC: 13 MMOL/L (ref 9–17)
AST SERPL-CCNC: 46 U/L
BASOPHILS # BLD: <0.03 K/UL (ref 0–0.2)
BASOPHILS NFR BLD: 0 % (ref 0–2)
BILIRUB SERPL-MCNC: 2.2 MG/DL (ref 0.3–1.2)
BUN SERPL-MCNC: 25 MG/DL (ref 8–23)
CALCIUM SERPL-MCNC: 8.2 MG/DL (ref 8.6–10.4)
CHLORIDE SERPL-SCNC: 100 MMOL/L (ref 98–107)
CO2 SERPL-SCNC: 20 MMOL/L (ref 20–31)
CREAT SERPL-MCNC: 1.2 MG/DL (ref 0.7–1.2)
EOSINOPHIL # BLD: <0.03 K/UL (ref 0–0.44)
EOSINOPHILS RELATIVE PERCENT: 0 % (ref 1–4)
ERYTHROCYTE [DISTWIDTH] IN BLOOD BY AUTOMATED COUNT: 12.9 % (ref 11.8–14.4)
GFR SERPL CREATININE-BSD FRML MDRD: >60 ML/MIN/1.73M2
GLUCOSE BLD-MCNC: 100 MG/DL (ref 75–110)
GLUCOSE BLD-MCNC: 100 MG/DL (ref 75–110)
GLUCOSE BLD-MCNC: 103 MG/DL (ref 75–110)
GLUCOSE BLD-MCNC: 132 MG/DL (ref 75–110)
GLUCOSE BLD-MCNC: 148 MG/DL (ref 75–110)
GLUCOSE BLD-MCNC: 161 MG/DL (ref 75–110)
GLUCOSE BLD-MCNC: 92 MG/DL (ref 75–110)
GLUCOSE SERPL-MCNC: 96 MG/DL (ref 70–99)
HCT VFR BLD AUTO: 30.7 % (ref 40.7–50.3)
HGB BLD-MCNC: 10.1 G/DL (ref 13–17)
IMM GRANULOCYTES # BLD AUTO: 0.12 K/UL (ref 0–0.3)
IMM GRANULOCYTES NFR BLD: 1 %
LYMPHOCYTES NFR BLD: 0.82 K/UL (ref 1.1–3.7)
LYMPHOCYTES RELATIVE PERCENT: 5 % (ref 24–43)
MAGNESIUM SERPL-MCNC: 2.1 MG/DL (ref 1.6–2.6)
MCH RBC QN AUTO: 33.6 PG (ref 25.2–33.5)
MCHC RBC AUTO-ENTMCNC: 32.9 G/DL (ref 28.4–34.8)
MCV RBC AUTO: 102 FL (ref 82.6–102.9)
MONOCYTES NFR BLD: 1.28 K/UL (ref 0.1–1.2)
MONOCYTES NFR BLD: 8 % (ref 3–12)
NEUTROPHILS NFR BLD: 86 % (ref 36–65)
NEUTS SEG NFR BLD: 13.92 K/UL (ref 1.5–8.1)
NRBC BLD-RTO: 0 PER 100 WBC
PHOSPHATE SERPL-MCNC: 2.3 MG/DL (ref 2.5–4.5)
PLATELET # BLD AUTO: 104 K/UL (ref 138–453)
PMV BLD AUTO: 10.5 FL (ref 8.1–13.5)
POTASSIUM SERPL-SCNC: 4.1 MMOL/L (ref 3.7–5.3)
PROT SERPL-MCNC: 5.6 G/DL (ref 6.4–8.3)
RBC # BLD AUTO: 3.01 M/UL (ref 4.21–5.77)
SODIUM SERPL-SCNC: 133 MMOL/L (ref 135–144)
WBC OTHER # BLD: 16.2 K/UL (ref 3.5–11.3)

## 2023-10-12 PROCEDURE — 82140 ASSAY OF AMMONIA: CPT

## 2023-10-12 PROCEDURE — 6370000000 HC RX 637 (ALT 250 FOR IP): Performed by: STUDENT IN AN ORGANIZED HEALTH CARE EDUCATION/TRAINING PROGRAM

## 2023-10-12 PROCEDURE — 6360000002 HC RX W HCPCS: Performed by: SURGERY

## 2023-10-12 PROCEDURE — 2500000003 HC RX 250 WO HCPCS: Performed by: SURGERY

## 2023-10-12 PROCEDURE — 94761 N-INVAS EAR/PLS OXIMETRY MLT: CPT

## 2023-10-12 PROCEDURE — 2580000003 HC RX 258: Performed by: SURGERY

## 2023-10-12 PROCEDURE — 36415 COLL VENOUS BLD VENIPUNCTURE: CPT

## 2023-10-12 PROCEDURE — 2500000003 HC RX 250 WO HCPCS: Performed by: STUDENT IN AN ORGANIZED HEALTH CARE EDUCATION/TRAINING PROGRAM

## 2023-10-12 PROCEDURE — 83735 ASSAY OF MAGNESIUM: CPT

## 2023-10-12 PROCEDURE — 82947 ASSAY GLUCOSE BLOOD QUANT: CPT

## 2023-10-12 PROCEDURE — 2580000003 HC RX 258: Performed by: STUDENT IN AN ORGANIZED HEALTH CARE EDUCATION/TRAINING PROGRAM

## 2023-10-12 PROCEDURE — 6360000002 HC RX W HCPCS: Performed by: STUDENT IN AN ORGANIZED HEALTH CARE EDUCATION/TRAINING PROGRAM

## 2023-10-12 PROCEDURE — 97116 GAIT TRAINING THERAPY: CPT

## 2023-10-12 PROCEDURE — 80053 COMPREHEN METABOLIC PANEL: CPT

## 2023-10-12 PROCEDURE — 84100 ASSAY OF PHOSPHORUS: CPT

## 2023-10-12 PROCEDURE — 97166 OT EVAL MOD COMPLEX 45 MIN: CPT

## 2023-10-12 PROCEDURE — 85025 COMPLETE CBC W/AUTO DIFF WBC: CPT

## 2023-10-12 PROCEDURE — 97535 SELF CARE MNGMENT TRAINING: CPT

## 2023-10-12 PROCEDURE — 94640 AIRWAY INHALATION TREATMENT: CPT

## 2023-10-12 PROCEDURE — 1200000000 HC SEMI PRIVATE

## 2023-10-12 PROCEDURE — 6370000000 HC RX 637 (ALT 250 FOR IP): Performed by: SURGERY

## 2023-10-12 RX ORDER — LORAZEPAM 2 MG/ML
4 INJECTION INTRAMUSCULAR
Status: DISCONTINUED | OUTPATIENT
Start: 2023-10-12 | End: 2023-10-16

## 2023-10-12 RX ORDER — IPRATROPIUM BROMIDE AND ALBUTEROL SULFATE 2.5; .5 MG/3ML; MG/3ML
1 SOLUTION RESPIRATORY (INHALATION) 2 TIMES DAILY
Status: DISCONTINUED | OUTPATIENT
Start: 2023-10-12 | End: 2023-10-14

## 2023-10-12 RX ORDER — SPIRONOLACTONE 25 MG/1
25 TABLET ORAL DAILY
Qty: 30 TABLET | Refills: 0 | Status: SHIPPED | OUTPATIENT
Start: 2023-10-12

## 2023-10-12 RX ORDER — SODIUM CHLORIDE 0.9 % (FLUSH) 0.9 %
5-40 SYRINGE (ML) INJECTION EVERY 12 HOURS SCHEDULED
Status: DISCONTINUED | OUTPATIENT
Start: 2023-10-12 | End: 2023-10-25 | Stop reason: HOSPADM

## 2023-10-12 RX ORDER — PHENOBARBITAL SODIUM 65 MG/ML
32.5 INJECTION INTRAMUSCULAR EVERY 6 HOURS PRN
Status: DISPENSED | OUTPATIENT
Start: 2023-10-13 | End: 2023-10-15

## 2023-10-12 RX ORDER — LORAZEPAM 1 MG/1
2 TABLET ORAL
Status: DISCONTINUED | OUTPATIENT
Start: 2023-10-12 | End: 2023-10-16

## 2023-10-12 RX ORDER — LANOLIN ALCOHOL/MO/W.PET/CERES
100 CREAM (GRAM) TOPICAL DAILY
Status: DISCONTINUED | OUTPATIENT
Start: 2023-10-12 | End: 2023-10-25 | Stop reason: HOSPADM

## 2023-10-12 RX ORDER — SODIUM CHLORIDE 0.9 % (FLUSH) 0.9 %
5-40 SYRINGE (ML) INJECTION PRN
Status: DISCONTINUED | OUTPATIENT
Start: 2023-10-12 | End: 2023-10-25 | Stop reason: HOSPADM

## 2023-10-12 RX ORDER — MULTIVITAMIN WITH IRON
1 TABLET ORAL DAILY
Status: DISCONTINUED | OUTPATIENT
Start: 2023-10-12 | End: 2023-10-25 | Stop reason: HOSPADM

## 2023-10-12 RX ORDER — LORAZEPAM 2 MG/ML
1 INJECTION INTRAMUSCULAR
Status: DISCONTINUED | OUTPATIENT
Start: 2023-10-12 | End: 2023-10-16

## 2023-10-12 RX ORDER — PHENOBARBITAL SODIUM 65 MG/ML
32.5 INJECTION INTRAMUSCULAR 2 TIMES DAILY
Status: DISCONTINUED | OUTPATIENT
Start: 2023-10-14 | End: 2023-10-15

## 2023-10-12 RX ORDER — LORAZEPAM 1 MG/1
1 TABLET ORAL
Status: DISCONTINUED | OUTPATIENT
Start: 2023-10-12 | End: 2023-10-16

## 2023-10-12 RX ORDER — GABAPENTIN 100 MG/1
100 CAPSULE ORAL EVERY 8 HOURS SCHEDULED
Status: DISCONTINUED | OUTPATIENT
Start: 2023-10-12 | End: 2023-10-13

## 2023-10-12 RX ORDER — PHENOBARBITAL SODIUM 65 MG/ML
65 INJECTION INTRAMUSCULAR EVERY 6 HOURS PRN
Status: ACTIVE | OUTPATIENT
Start: 2023-10-12 | End: 2023-10-13

## 2023-10-12 RX ORDER — SODIUM CHLORIDE 9 MG/ML
INJECTION, SOLUTION INTRAVENOUS PRN
Status: DISCONTINUED | OUTPATIENT
Start: 2023-10-12 | End: 2023-10-25 | Stop reason: HOSPADM

## 2023-10-12 RX ORDER — LORAZEPAM 2 MG/ML
2 INJECTION INTRAMUSCULAR
Status: DISCONTINUED | OUTPATIENT
Start: 2023-10-12 | End: 2023-10-16

## 2023-10-12 RX ORDER — QUETIAPINE FUMARATE 25 MG/1
50 TABLET, FILM COATED ORAL NIGHTLY
Status: DISCONTINUED | OUTPATIENT
Start: 2023-10-12 | End: 2023-10-25 | Stop reason: HOSPADM

## 2023-10-12 RX ORDER — PHENOBARBITAL SODIUM 65 MG/ML
16.2 INJECTION INTRAMUSCULAR 2 TIMES DAILY
Status: DISCONTINUED | OUTPATIENT
Start: 2023-10-15 | End: 2023-10-15

## 2023-10-12 RX ORDER — LORAZEPAM 1 MG/1
3 TABLET ORAL
Status: DISCONTINUED | OUTPATIENT
Start: 2023-10-12 | End: 2023-10-16

## 2023-10-12 RX ORDER — METHOCARBAMOL 750 MG/1
750 TABLET, FILM COATED ORAL EVERY 8 HOURS
Status: DISCONTINUED | OUTPATIENT
Start: 2023-10-12 | End: 2023-10-20

## 2023-10-12 RX ORDER — LANOLIN ALCOHOL/MO/W.PET/CERES
6 CREAM (GRAM) TOPICAL NIGHTLY
Status: DISCONTINUED | OUTPATIENT
Start: 2023-10-12 | End: 2023-10-25 | Stop reason: HOSPADM

## 2023-10-12 RX ORDER — SODIUM CHLORIDE 9 MG/ML
INJECTION, SOLUTION INTRAVENOUS PRN
Status: DISCONTINUED | OUTPATIENT
Start: 2023-10-12 | End: 2023-10-12

## 2023-10-12 RX ORDER — PHENOBARBITAL SODIUM 65 MG/ML
16.2 INJECTION INTRAMUSCULAR EVERY 6 HOURS PRN
Status: ACTIVE | OUTPATIENT
Start: 2023-10-15 | End: 2023-10-16

## 2023-10-12 RX ORDER — PHENOBARBITAL SODIUM 65 MG/ML
32.5 INJECTION INTRAMUSCULAR 4 TIMES DAILY
Status: DISPENSED | OUTPATIENT
Start: 2023-10-13 | End: 2023-10-14

## 2023-10-12 RX ORDER — PHENOBARBITAL SODIUM 65 MG/ML
65 INJECTION INTRAMUSCULAR 4 TIMES DAILY
Status: DISPENSED | OUTPATIENT
Start: 2023-10-12 | End: 2023-10-13

## 2023-10-12 RX ORDER — LORAZEPAM 2 MG/ML
3 INJECTION INTRAMUSCULAR
Status: DISCONTINUED | OUTPATIENT
Start: 2023-10-12 | End: 2023-10-16

## 2023-10-12 RX ORDER — LORAZEPAM 1 MG/1
4 TABLET ORAL
Status: DISCONTINUED | OUTPATIENT
Start: 2023-10-12 | End: 2023-10-16

## 2023-10-12 RX ADMIN — Medication 6 MG: at 22:01

## 2023-10-12 RX ADMIN — QUETIAPINE FUMARATE 50 MG: 25 TABLET ORAL at 22:01

## 2023-10-12 RX ADMIN — PHENOBARBITAL SODIUM 65 MG: 65 INJECTION INTRAMUSCULAR; INTRAVENOUS at 18:47

## 2023-10-12 RX ADMIN — ENOXAPARIN SODIUM 40 MG: 100 INJECTION SUBCUTANEOUS at 09:49

## 2023-10-12 RX ADMIN — METHOCARBAMOL TABLETS 750 MG: 750 TABLET, COATED ORAL at 04:19

## 2023-10-12 RX ADMIN — FUROSEMIDE 20 MG: 10 INJECTION, SOLUTION INTRAMUSCULAR; INTRAVENOUS at 09:49

## 2023-10-12 RX ADMIN — METOCLOPRAMIDE 10 MG: 5 INJECTION, SOLUTION INTRAMUSCULAR; INTRAVENOUS at 04:19

## 2023-10-12 RX ADMIN — GABAPENTIN 300 MG: 300 CAPSULE ORAL at 05:24

## 2023-10-12 RX ADMIN — SODIUM CHLORIDE, PRESERVATIVE FREE 20 MG: 5 INJECTION INTRAVENOUS at 09:49

## 2023-10-12 RX ADMIN — NADOLOL 20 MG: 20 TABLET ORAL at 09:50

## 2023-10-12 RX ADMIN — METOCLOPRAMIDE 10 MG: 5 INJECTION, SOLUTION INTRAMUSCULAR; INTRAVENOUS at 22:01

## 2023-10-12 RX ADMIN — LORAZEPAM 2 MG: 2 INJECTION INTRAMUSCULAR; INTRAVENOUS at 12:20

## 2023-10-12 RX ADMIN — METOCLOPRAMIDE 10 MG: 5 INJECTION, SOLUTION INTRAMUSCULAR; INTRAVENOUS at 09:49

## 2023-10-12 RX ADMIN — DEXTROSE MONOHYDRATE, SODIUM CHLORIDE, AND POTASSIUM CHLORIDE: 50; 4.5; 1.49 INJECTION, SOLUTION INTRAVENOUS at 03:25

## 2023-10-12 RX ADMIN — BISACODYL 10 MG: 10 SUPPOSITORY RECTAL at 09:49

## 2023-10-12 RX ADMIN — IPRATROPIUM BROMIDE AND ALBUTEROL SULFATE 1 DOSE: 2.5; .5 SOLUTION RESPIRATORY (INHALATION) at 21:58

## 2023-10-12 RX ADMIN — SPIRONOLACTONE 25 MG: 25 TABLET ORAL at 09:49

## 2023-10-12 RX ADMIN — METOCLOPRAMIDE 10 MG: 5 INJECTION, SOLUTION INTRAMUSCULAR; INTRAVENOUS at 17:16

## 2023-10-12 RX ADMIN — OXYCODONE HYDROCHLORIDE 5 MG: 5 TABLET ORAL at 02:39

## 2023-10-12 RX ADMIN — LORAZEPAM 4 MG: 1 TABLET ORAL at 13:23

## 2023-10-12 RX ADMIN — SODIUM PHOSPHATE, MONOBASIC, MONOHYDRATE AND SODIUM PHOSPHATE, DIBASIC, ANHYDROUS 20 MMOL: 276; 142 INJECTION, SOLUTION INTRAVENOUS at 06:38

## 2023-10-12 ASSESSMENT — PAIN DESCRIPTION - LOCATION
LOCATION: ABDOMEN
LOCATION: ABDOMEN

## 2023-10-12 ASSESSMENT — PAIN DESCRIPTION - DESCRIPTORS
DESCRIPTORS: ACHING
DESCRIPTORS: ACHING

## 2023-10-12 ASSESSMENT — PAIN DESCRIPTION - ORIENTATION: ORIENTATION: RIGHT

## 2023-10-12 ASSESSMENT — PAIN SCALES - GENERAL
PAINLEVEL_OUTOF10: 8
PAINLEVEL_OUTOF10: 7
PAINLEVEL_OUTOF10: 8

## 2023-10-12 NOTE — CARE COORDINATION
Consult received for chemical dependency. Met with pt who stated he lives alone. Stated he is a retired . Ins thru [employer]. Pt very fidgety. Has sitter at bedside. Pt stated he drinks alcohol 2x week, on the weekend. Stated he used to be a daily drinker but quit that a year ago. Stated he drinks 4 beers (25oz). He reports occasional marijuana. He denies all other drug use. Pt stated he has been concerned about his drinking in the past.  Stated he went thru tx 20+ yrs ago but did not know where. Stated he has also attended AA in the past. Pt admits he does not plan to quit drinking at this time. He was receptive to receiving tx resources -provided pt with list of area tx programs and walk in assessment clinics.

## 2023-10-13 LAB
ANION GAP SERPL CALCULATED.3IONS-SCNC: 9 MMOL/L (ref 9–17)
BASOPHILS # BLD: 0 K/UL (ref 0–0.2)
BASOPHILS NFR BLD: 0 % (ref 0–2)
BUN SERPL-MCNC: 21 MG/DL (ref 8–23)
CALCIUM SERPL-MCNC: 8 MG/DL (ref 8.6–10.4)
CHLORIDE SERPL-SCNC: 104 MMOL/L (ref 98–107)
CO2 SERPL-SCNC: 23 MMOL/L (ref 20–31)
CREAT SERPL-MCNC: 0.8 MG/DL (ref 0.7–1.2)
EOSINOPHIL # BLD: 0 K/UL (ref 0–0.4)
EOSINOPHILS RELATIVE PERCENT: 0 % (ref 1–4)
ERYTHROCYTE [DISTWIDTH] IN BLOOD BY AUTOMATED COUNT: 13.2 % (ref 11.8–14.4)
GFR SERPL CREATININE-BSD FRML MDRD: >60 ML/MIN/1.73M2
GLUCOSE BLD-MCNC: 114 MG/DL (ref 75–110)
GLUCOSE BLD-MCNC: 117 MG/DL (ref 75–110)
GLUCOSE BLD-MCNC: 125 MG/DL (ref 75–110)
GLUCOSE BLD-MCNC: 129 MG/DL (ref 75–110)
GLUCOSE BLD-MCNC: 129 MG/DL (ref 75–110)
GLUCOSE BLD-MCNC: 132 MG/DL (ref 75–110)
GLUCOSE BLD-MCNC: 155 MG/DL (ref 75–110)
GLUCOSE SERPL-MCNC: 114 MG/DL (ref 70–99)
HCT VFR BLD AUTO: 35.5 % (ref 40.7–50.3)
HGB BLD-MCNC: 11.3 G/DL (ref 13–17)
IMM GRANULOCYTES # BLD AUTO: 0 K/UL (ref 0–0.3)
IMM GRANULOCYTES NFR BLD: 0 %
LYMPHOCYTES NFR BLD: 0.68 K/UL (ref 1–4.8)
LYMPHOCYTES RELATIVE PERCENT: 8 % (ref 24–44)
MAGNESIUM SERPL-MCNC: 1.6 MG/DL (ref 1.6–2.6)
MCH RBC QN AUTO: 33.6 PG (ref 25.2–33.5)
MCHC RBC AUTO-ENTMCNC: 31.8 G/DL (ref 28.4–34.8)
MCV RBC AUTO: 105.7 FL (ref 82.6–102.9)
MONOCYTES NFR BLD: 0.85 K/UL (ref 0.1–0.8)
MONOCYTES NFR BLD: 10 % (ref 1–7)
MORPHOLOGY: ABNORMAL
NEUTROPHILS NFR BLD: 82 % (ref 36–66)
NEUTS SEG NFR BLD: 6.97 K/UL (ref 1.8–7.7)
NRBC BLD-RTO: 0 PER 100 WBC
PHOSPHATE SERPL-MCNC: 1.5 MG/DL (ref 2.5–4.5)
PLATELET # BLD AUTO: 82 K/UL (ref 138–453)
PMV BLD AUTO: 10.8 FL (ref 8.1–13.5)
POTASSIUM SERPL-SCNC: 3.9 MMOL/L (ref 3.7–5.3)
RBC # BLD AUTO: 3.36 M/UL (ref 4.21–5.77)
REASON FOR REJECTION: NORMAL
SODIUM SERPL-SCNC: 136 MMOL/L (ref 135–144)
SPECIMEN SOURCE: NORMAL
SURGICAL PATHOLOGY REPORT: NORMAL
WBC OTHER # BLD: 8.5 K/UL (ref 3.5–11.3)
ZZ NTE CLEAN UP: ORDERED TEST: NORMAL

## 2023-10-13 PROCEDURE — 2580000003 HC RX 258: Performed by: SURGERY

## 2023-10-13 PROCEDURE — 2500000003 HC RX 250 WO HCPCS: Performed by: SURGERY

## 2023-10-13 PROCEDURE — 6360000002 HC RX W HCPCS: Performed by: STUDENT IN AN ORGANIZED HEALTH CARE EDUCATION/TRAINING PROGRAM

## 2023-10-13 PROCEDURE — 36415 COLL VENOUS BLD VENIPUNCTURE: CPT

## 2023-10-13 PROCEDURE — 84100 ASSAY OF PHOSPHORUS: CPT

## 2023-10-13 PROCEDURE — 6370000000 HC RX 637 (ALT 250 FOR IP): Performed by: STUDENT IN AN ORGANIZED HEALTH CARE EDUCATION/TRAINING PROGRAM

## 2023-10-13 PROCEDURE — 2500000003 HC RX 250 WO HCPCS: Performed by: STUDENT IN AN ORGANIZED HEALTH CARE EDUCATION/TRAINING PROGRAM

## 2023-10-13 PROCEDURE — 2580000003 HC RX 258: Performed by: STUDENT IN AN ORGANIZED HEALTH CARE EDUCATION/TRAINING PROGRAM

## 2023-10-13 PROCEDURE — 80048 BASIC METABOLIC PNL TOTAL CA: CPT

## 2023-10-13 PROCEDURE — 6360000002 HC RX W HCPCS: Performed by: SURGERY

## 2023-10-13 PROCEDURE — 94640 AIRWAY INHALATION TREATMENT: CPT

## 2023-10-13 PROCEDURE — 82947 ASSAY GLUCOSE BLOOD QUANT: CPT

## 2023-10-13 PROCEDURE — 83735 ASSAY OF MAGNESIUM: CPT

## 2023-10-13 PROCEDURE — 1200000000 HC SEMI PRIVATE

## 2023-10-13 PROCEDURE — 85025 COMPLETE CBC W/AUTO DIFF WBC: CPT

## 2023-10-13 RX ORDER — MAGNESIUM SULFATE 1 G/100ML
1000 INJECTION INTRAVENOUS
Status: DISCONTINUED | OUTPATIENT
Start: 2023-10-13 | End: 2023-10-13

## 2023-10-13 RX ORDER — MAGNESIUM SULFATE IN WATER 40 MG/ML
2000 INJECTION, SOLUTION INTRAVENOUS ONCE
Status: COMPLETED | OUTPATIENT
Start: 2023-10-13 | End: 2023-10-13

## 2023-10-13 RX ORDER — MAGNESIUM SULFATE 1 G/100ML
1000 INJECTION INTRAVENOUS ONCE
Status: COMPLETED | OUTPATIENT
Start: 2023-10-13 | End: 2023-10-13

## 2023-10-13 RX ADMIN — SODIUM CHLORIDE, PRESERVATIVE FREE 20 MG: 5 INJECTION INTRAVENOUS at 10:29

## 2023-10-13 RX ADMIN — LORAZEPAM 2 MG: 2 INJECTION INTRAMUSCULAR; INTRAVENOUS at 02:37

## 2023-10-13 RX ADMIN — DEXTROSE MONOHYDRATE, SODIUM CHLORIDE, AND POTASSIUM CHLORIDE: 50; 4.5; 1.49 INJECTION, SOLUTION INTRAVENOUS at 01:14

## 2023-10-13 RX ADMIN — PHENOBARBITAL SODIUM 32.5 MG: 65 INJECTION INTRAMUSCULAR; INTRAVENOUS at 16:36

## 2023-10-13 RX ADMIN — WATER 10 MG: 1 INJECTION INTRAMUSCULAR; INTRAVENOUS; SUBCUTANEOUS at 10:10

## 2023-10-13 RX ADMIN — SODIUM CHLORIDE, PRESERVATIVE FREE 20 MG: 5 INJECTION INTRAVENOUS at 20:36

## 2023-10-13 RX ADMIN — PHENOBARBITAL SODIUM 65 MG: 65 INJECTION INTRAMUSCULAR; INTRAVENOUS at 10:22

## 2023-10-13 RX ADMIN — ENOXAPARIN SODIUM 40 MG: 100 INJECTION SUBCUTANEOUS at 10:10

## 2023-10-13 RX ADMIN — METOCLOPRAMIDE 10 MG: 5 INJECTION, SOLUTION INTRAMUSCULAR; INTRAVENOUS at 15:07

## 2023-10-13 RX ADMIN — MAGNESIUM SULFATE HEPTAHYDRATE 1000 MG: 1 INJECTION, SOLUTION INTRAVENOUS at 15:03

## 2023-10-13 RX ADMIN — QUETIAPINE FUMARATE 50 MG: 25 TABLET ORAL at 20:32

## 2023-10-13 RX ADMIN — MAGNESIUM SULFATE HEPTAHYDRATE 2000 MG: 40 INJECTION, SOLUTION INTRAVENOUS at 12:51

## 2023-10-13 RX ADMIN — LORAZEPAM 2 MG: 2 INJECTION INTRAMUSCULAR; INTRAVENOUS at 16:46

## 2023-10-13 RX ADMIN — PHENOBARBITAL SODIUM 32.5 MG: 65 INJECTION INTRAMUSCULAR; INTRAVENOUS at 13:39

## 2023-10-13 RX ADMIN — METOCLOPRAMIDE 10 MG: 5 INJECTION, SOLUTION INTRAMUSCULAR; INTRAVENOUS at 10:10

## 2023-10-13 RX ADMIN — IPRATROPIUM BROMIDE AND ALBUTEROL SULFATE 1 DOSE: 2.5; .5 SOLUTION RESPIRATORY (INHALATION) at 21:20

## 2023-10-13 RX ADMIN — METHOCARBAMOL TABLETS 750 MG: 750 TABLET, COATED ORAL at 20:33

## 2023-10-13 RX ADMIN — DEXTROSE MONOHYDRATE, SODIUM CHLORIDE, AND POTASSIUM CHLORIDE: 50; 4.5; 1.49 INJECTION, SOLUTION INTRAVENOUS at 10:26

## 2023-10-13 RX ADMIN — PHENOBARBITAL SODIUM 65 MG: 65 INJECTION INTRAMUSCULAR; INTRAVENOUS at 01:17

## 2023-10-13 RX ADMIN — PHENOBARBITAL SODIUM 65 MG: 65 INJECTION INTRAMUSCULAR; INTRAVENOUS at 06:11

## 2023-10-13 RX ADMIN — LORAZEPAM 3 MG: 2 INJECTION INTRAMUSCULAR; INTRAVENOUS at 04:18

## 2023-10-13 RX ADMIN — SODIUM PHOSPHATE, MONOBASIC, MONOHYDRATE AND SODIUM PHOSPHATE, DIBASIC, ANHYDROUS 30 MMOL: 276; 142 INJECTION, SOLUTION INTRAVENOUS at 16:36

## 2023-10-13 RX ADMIN — FUROSEMIDE 20 MG: 10 INJECTION, SOLUTION INTRAMUSCULAR; INTRAVENOUS at 10:10

## 2023-10-13 RX ADMIN — LORAZEPAM 3 MG: 2 INJECTION INTRAMUSCULAR; INTRAVENOUS at 12:16

## 2023-10-13 RX ADMIN — METOCLOPRAMIDE 10 MG: 5 INJECTION, SOLUTION INTRAMUSCULAR; INTRAVENOUS at 04:06

## 2023-10-13 RX ADMIN — PHENOBARBITAL SODIUM 32.5 MG: 65 INJECTION INTRAMUSCULAR; INTRAVENOUS at 23:46

## 2023-10-13 RX ADMIN — METOCLOPRAMIDE 10 MG: 5 INJECTION, SOLUTION INTRAMUSCULAR; INTRAVENOUS at 20:37

## 2023-10-13 RX ADMIN — Medication 6 MG: at 20:33

## 2023-10-14 LAB
ALBUMIN SERPL-MCNC: 2.3 G/DL (ref 3.5–5.2)
ALBUMIN/GLOB SERPL: 0.8 {RATIO} (ref 1–2.5)
ALP SERPL-CCNC: 79 U/L (ref 40–129)
ALT SERPL-CCNC: 7 U/L (ref 5–41)
ANION GAP SERPL CALCULATED.3IONS-SCNC: 10 MMOL/L (ref 9–17)
AST SERPL-CCNC: 33 U/L
BASOPHILS # BLD: 0 K/UL (ref 0–0.2)
BASOPHILS NFR BLD: 0 % (ref 0–2)
BILIRUB DIRECT SERPL-MCNC: 1.2 MG/DL
BILIRUB INDIRECT SERPL-MCNC: 1.1 MG/DL (ref 0–1)
BILIRUB SERPL-MCNC: 2.3 MG/DL (ref 0.3–1.2)
BUN SERPL-MCNC: 18 MG/DL (ref 8–23)
CALCIUM SERPL-MCNC: 8.3 MG/DL (ref 8.6–10.4)
CHLORIDE SERPL-SCNC: 106 MMOL/L (ref 98–107)
CO2 SERPL-SCNC: 24 MMOL/L (ref 20–31)
CREAT SERPL-MCNC: 0.8 MG/DL (ref 0.7–1.2)
EOSINOPHIL # BLD: 0.07 K/UL (ref 0–0.44)
EOSINOPHILS RELATIVE PERCENT: 1 % (ref 1–4)
ERYTHROCYTE [DISTWIDTH] IN BLOOD BY AUTOMATED COUNT: 13 % (ref 11.8–14.4)
GFR SERPL CREATININE-BSD FRML MDRD: >60 ML/MIN/1.73M2
GLUCOSE BLD-MCNC: 121 MG/DL (ref 75–110)
GLUCOSE BLD-MCNC: 123 MG/DL (ref 75–110)
GLUCOSE BLD-MCNC: 126 MG/DL (ref 75–110)
GLUCOSE BLD-MCNC: 128 MG/DL (ref 75–110)
GLUCOSE BLD-MCNC: 144 MG/DL (ref 75–110)
GLUCOSE BLD-MCNC: 153 MG/DL (ref 75–110)
GLUCOSE SERPL-MCNC: 134 MG/DL (ref 70–99)
HCT VFR BLD AUTO: 33 % (ref 40.7–50.3)
HGB BLD-MCNC: 11.2 G/DL (ref 13–17)
IMM GRANULOCYTES # BLD AUTO: 0 K/UL (ref 0–0.3)
IMM GRANULOCYTES NFR BLD: 0 %
LYMPHOCYTES NFR BLD: 0.28 K/UL (ref 1.1–3.7)
LYMPHOCYTES RELATIVE PERCENT: 4 % (ref 24–43)
MAGNESIUM SERPL-MCNC: 1.8 MG/DL (ref 1.6–2.6)
MCH RBC QN AUTO: 33.6 PG (ref 25.2–33.5)
MCHC RBC AUTO-ENTMCNC: 33.9 G/DL (ref 28.4–34.8)
MCV RBC AUTO: 99.1 FL (ref 82.6–102.9)
MONOCYTES NFR BLD: 1.78 K/UL (ref 0.1–1.2)
MONOCYTES NFR BLD: 25 % (ref 3–12)
MORPHOLOGY: NORMAL
NEUTROPHILS NFR BLD: 70 % (ref 36–65)
NEUTS SEG NFR BLD: 4.97 K/UL (ref 1.5–8.1)
NRBC BLD-RTO: 0 PER 100 WBC
PHOSPHATE SERPL-MCNC: 2.6 MG/DL (ref 2.5–4.5)
PLATELET # BLD AUTO: 108 K/UL (ref 138–453)
PMV BLD AUTO: 9.6 FL (ref 8.1–13.5)
POTASSIUM SERPL-SCNC: 3.3 MMOL/L (ref 3.7–5.3)
PROT SERPL-MCNC: 5.1 G/DL (ref 6.4–8.3)
RBC # BLD AUTO: 3.33 M/UL (ref 4.21–5.77)
SODIUM SERPL-SCNC: 140 MMOL/L (ref 135–144)
WBC OTHER # BLD: 7.1 K/UL (ref 3.5–11.3)

## 2023-10-14 PROCEDURE — 97530 THERAPEUTIC ACTIVITIES: CPT

## 2023-10-14 PROCEDURE — 36415 COLL VENOUS BLD VENIPUNCTURE: CPT

## 2023-10-14 PROCEDURE — 80076 HEPATIC FUNCTION PANEL: CPT

## 2023-10-14 PROCEDURE — 82947 ASSAY GLUCOSE BLOOD QUANT: CPT

## 2023-10-14 PROCEDURE — 80048 BASIC METABOLIC PNL TOTAL CA: CPT

## 2023-10-14 PROCEDURE — 6370000000 HC RX 637 (ALT 250 FOR IP): Performed by: STUDENT IN AN ORGANIZED HEALTH CARE EDUCATION/TRAINING PROGRAM

## 2023-10-14 PROCEDURE — 6360000002 HC RX W HCPCS: Performed by: STUDENT IN AN ORGANIZED HEALTH CARE EDUCATION/TRAINING PROGRAM

## 2023-10-14 PROCEDURE — 83735 ASSAY OF MAGNESIUM: CPT

## 2023-10-14 PROCEDURE — 2500000003 HC RX 250 WO HCPCS: Performed by: SURGERY

## 2023-10-14 PROCEDURE — 1200000000 HC SEMI PRIVATE

## 2023-10-14 PROCEDURE — 6360000002 HC RX W HCPCS: Performed by: SURGERY

## 2023-10-14 PROCEDURE — 2500000003 HC RX 250 WO HCPCS: Performed by: STUDENT IN AN ORGANIZED HEALTH CARE EDUCATION/TRAINING PROGRAM

## 2023-10-14 PROCEDURE — 6360000002 HC RX W HCPCS

## 2023-10-14 PROCEDURE — 85025 COMPLETE CBC W/AUTO DIFF WBC: CPT

## 2023-10-14 PROCEDURE — 2500000003 HC RX 250 WO HCPCS

## 2023-10-14 PROCEDURE — 2580000003 HC RX 258: Performed by: SURGERY

## 2023-10-14 PROCEDURE — 94640 AIRWAY INHALATION TREATMENT: CPT

## 2023-10-14 PROCEDURE — 84100 ASSAY OF PHOSPHORUS: CPT

## 2023-10-14 RX ORDER — IPRATROPIUM BROMIDE AND ALBUTEROL SULFATE 2.5; .5 MG/3ML; MG/3ML
1 SOLUTION RESPIRATORY (INHALATION) EVERY 4 HOURS PRN
Status: DISCONTINUED | OUTPATIENT
Start: 2023-10-14 | End: 2023-10-17

## 2023-10-14 RX ORDER — MAGNESIUM SULFATE IN WATER 40 MG/ML
2000 INJECTION, SOLUTION INTRAVENOUS ONCE
Status: COMPLETED | OUTPATIENT
Start: 2023-10-14 | End: 2023-10-14

## 2023-10-14 RX ADMIN — POTASSIUM CHLORIDE 10 MEQ: 7.46 INJECTION, SOLUTION INTRAVENOUS at 13:17

## 2023-10-14 RX ADMIN — DEXTROSE MONOHYDRATE, SODIUM CHLORIDE, AND POTASSIUM CHLORIDE: 50; 4.5; 1.49 INJECTION, SOLUTION INTRAVENOUS at 20:54

## 2023-10-14 RX ADMIN — POTASSIUM CHLORIDE 10 MEQ: 7.46 INJECTION, SOLUTION INTRAVENOUS at 14:25

## 2023-10-14 RX ADMIN — DEXTROSE MONOHYDRATE, SODIUM CHLORIDE, AND POTASSIUM CHLORIDE: 50; 4.5; 1.49 INJECTION, SOLUTION INTRAVENOUS at 04:11

## 2023-10-14 RX ADMIN — POTASSIUM CHLORIDE 10 MEQ: 7.46 INJECTION, SOLUTION INTRAVENOUS at 11:50

## 2023-10-14 RX ADMIN — SODIUM CHLORIDE, PRESERVATIVE FREE 20 MG: 5 INJECTION INTRAVENOUS at 20:56

## 2023-10-14 RX ADMIN — FUROSEMIDE 20 MG: 10 INJECTION, SOLUTION INTRAMUSCULAR; INTRAVENOUS at 09:26

## 2023-10-14 RX ADMIN — IPRATROPIUM BROMIDE AND ALBUTEROL SULFATE 1 DOSE: 2.5; .5 SOLUTION RESPIRATORY (INHALATION) at 09:02

## 2023-10-14 RX ADMIN — METOCLOPRAMIDE 10 MG: 5 INJECTION, SOLUTION INTRAMUSCULAR; INTRAVENOUS at 04:10

## 2023-10-14 RX ADMIN — ENOXAPARIN SODIUM 40 MG: 100 INJECTION SUBCUTANEOUS at 09:25

## 2023-10-14 RX ADMIN — SODIUM CHLORIDE, PRESERVATIVE FREE 20 MG: 5 INJECTION INTRAVENOUS at 09:26

## 2023-10-14 RX ADMIN — MAGNESIUM SULFATE HEPTAHYDRATE 2000 MG: 40 INJECTION, SOLUTION INTRAVENOUS at 09:36

## 2023-10-14 RX ADMIN — POTASSIUM CHLORIDE 10 MEQ: 7.46 INJECTION, SOLUTION INTRAVENOUS at 16:41

## 2023-10-14 ASSESSMENT — PAIN SCALES - GENERAL: PAINLEVEL_OUTOF10: 8

## 2023-10-15 ENCOUNTER — APPOINTMENT (OUTPATIENT)
Dept: GENERAL RADIOLOGY | Age: 63
DRG: 329 | End: 2023-10-15
Attending: SURGERY
Payer: COMMERCIAL

## 2023-10-15 LAB
GLUCOSE BLD-MCNC: 117 MG/DL (ref 75–110)
GLUCOSE BLD-MCNC: 139 MG/DL (ref 75–110)
GLUCOSE BLD-MCNC: 151 MG/DL (ref 75–110)
GLUCOSE BLD-MCNC: 152 MG/DL (ref 75–110)
GLUCOSE BLD-MCNC: 153 MG/DL (ref 75–110)
GLUCOSE BLD-MCNC: 156 MG/DL (ref 75–110)
GLUCOSE BLD-MCNC: 170 MG/DL (ref 75–110)
GLUCOSE BLD-MCNC: 170 MG/DL (ref 75–110)

## 2023-10-15 PROCEDURE — 2580000003 HC RX 258: Performed by: STUDENT IN AN ORGANIZED HEALTH CARE EDUCATION/TRAINING PROGRAM

## 2023-10-15 PROCEDURE — 1200000000 HC SEMI PRIVATE

## 2023-10-15 PROCEDURE — 82947 ASSAY GLUCOSE BLOOD QUANT: CPT

## 2023-10-15 PROCEDURE — 6370000000 HC RX 637 (ALT 250 FOR IP): Performed by: SURGERY

## 2023-10-15 PROCEDURE — 2500000003 HC RX 250 WO HCPCS: Performed by: SURGERY

## 2023-10-15 PROCEDURE — 2500000003 HC RX 250 WO HCPCS

## 2023-10-15 PROCEDURE — 6360000002 HC RX W HCPCS: Performed by: STUDENT IN AN ORGANIZED HEALTH CARE EDUCATION/TRAINING PROGRAM

## 2023-10-15 PROCEDURE — 2580000003 HC RX 258: Performed by: SURGERY

## 2023-10-15 PROCEDURE — 94660 CPAP INITIATION&MGMT: CPT

## 2023-10-15 PROCEDURE — 6370000000 HC RX 637 (ALT 250 FOR IP): Performed by: STUDENT IN AN ORGANIZED HEALTH CARE EDUCATION/TRAINING PROGRAM

## 2023-10-15 PROCEDURE — 71045 X-RAY EXAM CHEST 1 VIEW: CPT

## 2023-10-15 PROCEDURE — 6360000002 HC RX W HCPCS: Performed by: SURGERY

## 2023-10-15 RX ADMIN — FUROSEMIDE 20 MG: 10 INJECTION, SOLUTION INTRAMUSCULAR; INTRAVENOUS at 09:34

## 2023-10-15 RX ADMIN — HYDROMORPHONE HYDROCHLORIDE 0.5 MG: 1 INJECTION, SOLUTION INTRAMUSCULAR; INTRAVENOUS; SUBCUTANEOUS at 09:32

## 2023-10-15 RX ADMIN — HYDROMORPHONE HYDROCHLORIDE 0.5 MG: 1 INJECTION, SOLUTION INTRAMUSCULAR; INTRAVENOUS; SUBCUTANEOUS at 16:39

## 2023-10-15 RX ADMIN — ENOXAPARIN SODIUM 40 MG: 100 INJECTION SUBCUTANEOUS at 09:47

## 2023-10-15 RX ADMIN — PHENOBARBITAL SODIUM 32.5 MG: 65 INJECTION INTRAMUSCULAR; INTRAVENOUS at 09:48

## 2023-10-15 RX ADMIN — ONDANSETRON 4 MG: 2 INJECTION INTRAMUSCULAR; INTRAVENOUS at 03:26

## 2023-10-15 RX ADMIN — SODIUM CHLORIDE, PRESERVATIVE FREE 20 MG: 5 INJECTION INTRAVENOUS at 09:34

## 2023-10-15 RX ADMIN — DEXTROSE MONOHYDRATE, SODIUM CHLORIDE, AND POTASSIUM CHLORIDE: 50; 4.5; 1.49 INJECTION, SOLUTION INTRAVENOUS at 23:14

## 2023-10-15 RX ADMIN — SODIUM CHLORIDE, PRESERVATIVE FREE 20 MG: 5 INJECTION INTRAVENOUS at 23:17

## 2023-10-15 RX ADMIN — DEXTROSE MONOHYDRATE, SODIUM CHLORIDE, AND POTASSIUM CHLORIDE: 50; 4.5; 1.49 INJECTION, SOLUTION INTRAVENOUS at 09:45

## 2023-10-15 RX ADMIN — SODIUM CHLORIDE, PRESERVATIVE FREE 10 ML: 5 INJECTION INTRAVENOUS at 09:33

## 2023-10-16 LAB
ALLEN TEST: POSITIVE
AMMONIA PLAS-SCNC: 60 UMOL/L (ref 16–60)
ANION GAP SERPL CALCULATED.3IONS-SCNC: 10 MMOL/L (ref 9–17)
ANION GAP SERPL CALCULATED.3IONS-SCNC: 10 MMOL/L (ref 9–17)
BASOPHILS # BLD: 0 K/UL (ref 0–0.2)
BASOPHILS # BLD: 0.13 K/UL (ref 0–0.2)
BASOPHILS NFR BLD: 0 % (ref 0–2)
BASOPHILS NFR BLD: 1 % (ref 0–2)
BILIRUB UR QL STRIP: NEGATIVE
BUN SERPL-MCNC: 32 MG/DL (ref 8–23)
BUN SERPL-MCNC: 32 MG/DL (ref 8–23)
CALCIUM SERPL-MCNC: 8.3 MG/DL (ref 8.6–10.4)
CALCIUM SERPL-MCNC: 8.4 MG/DL (ref 8.6–10.4)
CHLORIDE SERPL-SCNC: 110 MMOL/L (ref 98–107)
CHLORIDE SERPL-SCNC: 111 MMOL/L (ref 98–107)
CLARITY UR: CLEAR
CO2 SERPL-SCNC: 23 MMOL/L (ref 20–31)
CO2 SERPL-SCNC: 24 MMOL/L (ref 20–31)
COLOR UR: YELLOW
CREAT SERPL-MCNC: 0.9 MG/DL (ref 0.7–1.2)
CREAT SERPL-MCNC: 1.1 MG/DL (ref 0.7–1.2)
EKG ATRIAL RATE: 109 BPM
EKG P AXIS: 53 DEGREES
EKG P-R INTERVAL: 148 MS
EKG Q-T INTERVAL: 328 MS
EKG QRS DURATION: 76 MS
EKG QTC CALCULATION (BAZETT): 441 MS
EKG R AXIS: 52 DEGREES
EKG T AXIS: -4 DEGREES
EKG VENTRICULAR RATE: 109 BPM
EOSINOPHIL # BLD: 0 K/UL (ref 0–0.44)
EOSINOPHIL # BLD: <0.03 K/UL (ref 0–0.44)
EOSINOPHILS RELATIVE PERCENT: 0 % (ref 1–4)
EOSINOPHILS RELATIVE PERCENT: 0 % (ref 1–4)
EPI CELLS #/AREA URNS HPF: ABNORMAL /HPF (ref 0–5)
ERYTHROCYTE [DISTWIDTH] IN BLOOD BY AUTOMATED COUNT: 13.1 % (ref 11.8–14.4)
ERYTHROCYTE [DISTWIDTH] IN BLOOD BY AUTOMATED COUNT: 13.2 % (ref 11.8–14.4)
FIO2: 36
GFR SERPL CREATININE-BSD FRML MDRD: >60 ML/MIN/1.73M2
GFR SERPL CREATININE-BSD FRML MDRD: >60 ML/MIN/1.73M2
GLUCOSE BLD-MCNC: 119 MG/DL (ref 75–110)
GLUCOSE BLD-MCNC: 124 MG/DL (ref 75–110)
GLUCOSE BLD-MCNC: 131 MG/DL (ref 75–110)
GLUCOSE BLD-MCNC: 133 MG/DL (ref 75–110)
GLUCOSE BLD-MCNC: 134 MG/DL (ref 75–110)
GLUCOSE BLD-MCNC: 141 MG/DL (ref 75–110)
GLUCOSE SERPL-MCNC: 125 MG/DL (ref 70–99)
GLUCOSE SERPL-MCNC: 143 MG/DL (ref 70–99)
GLUCOSE UR STRIP-MCNC: NEGATIVE MG/DL
HCT VFR BLD AUTO: 34.3 % (ref 40.7–50.3)
HCT VFR BLD AUTO: 35.7 % (ref 40.7–50.3)
HCT VFR BLD AUTO: 39.8 % (ref 40.7–50.3)
HGB BLD-MCNC: 11.1 G/DL (ref 13–17)
HGB BLD-MCNC: 11.2 G/DL (ref 13–17)
HGB BLD-MCNC: 12.5 G/DL (ref 13–17)
HGB UR QL STRIP.AUTO: ABNORMAL
IMM GRANULOCYTES # BLD AUTO: 0.11 K/UL (ref 0–0.3)
IMM GRANULOCYTES # BLD AUTO: 0.17 K/UL (ref 0–0.3)
IMM GRANULOCYTES NFR BLD: 1 %
IMM GRANULOCYTES NFR BLD: 1 %
INR PPP: 1.4
KETONES UR STRIP-MCNC: NEGATIVE MG/DL
LEUKOCYTE ESTERASE UR QL STRIP: NEGATIVE
LYMPHOCYTES NFR BLD: 0.45 K/UL (ref 1.1–3.7)
LYMPHOCYTES NFR BLD: 0.51 K/UL (ref 1.1–3.7)
LYMPHOCYTES RELATIVE PERCENT: 3 % (ref 24–43)
LYMPHOCYTES RELATIVE PERCENT: 3 % (ref 24–43)
MAGNESIUM SERPL-MCNC: 1.9 MG/DL (ref 1.6–2.6)
MCH RBC QN AUTO: 33.4 PG (ref 25.2–33.5)
MCH RBC QN AUTO: 33.7 PG (ref 25.2–33.5)
MCHC RBC AUTO-ENTMCNC: 31.4 G/DL (ref 28.4–34.8)
MCHC RBC AUTO-ENTMCNC: 32.4 G/DL (ref 28.4–34.8)
MCV RBC AUTO: 104.3 FL (ref 82.6–102.9)
MCV RBC AUTO: 106.6 FL (ref 82.6–102.9)
MONOCYTES NFR BLD: 15 % (ref 3–12)
MONOCYTES NFR BLD: 16 % (ref 3–12)
MONOCYTES NFR BLD: 2.24 K/UL (ref 0.1–1.2)
MONOCYTES NFR BLD: 2.55 K/UL (ref 0.1–1.2)
MORPHOLOGY: ABNORMAL
NEUTROPHILS NFR BLD: 80 % (ref 36–65)
NEUTROPHILS NFR BLD: 81 % (ref 36–65)
NEUTS SEG NFR BLD: 11.5 K/UL (ref 1.5–8.1)
NEUTS SEG NFR BLD: 13.77 K/UL (ref 1.5–8.1)
NITRITE UR QL STRIP: NEGATIVE
NRBC BLD-RTO: 0 PER 100 WBC
NRBC BLD-RTO: 0 PER 100 WBC
O2 DELIVERY DEVICE: ABNORMAL
PH UR STRIP: 5 [PH] (ref 5–8)
PHOSPHATE SERPL-MCNC: 3.9 MG/DL (ref 2.5–4.5)
PLATELET # BLD AUTO: 121 K/UL (ref 138–453)
PLATELET # BLD AUTO: 142 K/UL (ref 138–453)
PMV BLD AUTO: 10.1 FL (ref 8.1–13.5)
PMV BLD AUTO: 10.6 FL (ref 8.1–13.5)
POC HCO3: 26.7 MMOL/L (ref 21–28)
POC O2 SATURATION: 91.6 % (ref 94–98)
POC PCO2: 40.5 MM HG (ref 35–48)
POC PH: 7.43 (ref 7.35–7.45)
POC PO2: 61.2 MM HG (ref 83–108)
POSITIVE BASE EXCESS, ART: 2.2 MMOL/L (ref 0–3)
POTASSIUM SERPL-SCNC: 4.1 MMOL/L (ref 3.7–5.3)
POTASSIUM SERPL-SCNC: 4.4 MMOL/L (ref 3.7–5.3)
PROCALCITONIN SERPL-MCNC: 1.06 NG/ML
PROT UR STRIP-MCNC: NEGATIVE MG/DL
PROTHROMBIN TIME: 17.2 SEC (ref 11.7–14.9)
RBC # BLD AUTO: 3.29 M/UL (ref 4.21–5.77)
RBC # BLD AUTO: 3.35 M/UL (ref 4.21–5.77)
RBC # BLD: ABNORMAL 10*6/UL
RBC #/AREA URNS HPF: ABNORMAL /HPF (ref 0–4)
SAMPLE SITE: ABNORMAL
SODIUM SERPL-SCNC: 143 MMOL/L (ref 135–144)
SODIUM SERPL-SCNC: 145 MMOL/L (ref 135–144)
SP GR UR STRIP: 1.01 (ref 1–1.03)
UROBILINOGEN UR STRIP-ACNC: NORMAL EU/DL (ref 0–1)
WBC #/AREA URNS HPF: ABNORMAL /HPF (ref 0–5)
WBC OTHER # BLD: 14.4 K/UL (ref 3.5–11.3)
WBC OTHER # BLD: 17 K/UL (ref 3.5–11.3)

## 2023-10-16 PROCEDURE — C9113 INJ PANTOPRAZOLE SODIUM, VIA: HCPCS | Performed by: STUDENT IN AN ORGANIZED HEALTH CARE EDUCATION/TRAINING PROGRAM

## 2023-10-16 PROCEDURE — 36600 WITHDRAWAL OF ARTERIAL BLOOD: CPT

## 2023-10-16 PROCEDURE — 94640 AIRWAY INHALATION TREATMENT: CPT

## 2023-10-16 PROCEDURE — 6370000000 HC RX 637 (ALT 250 FOR IP): Performed by: STUDENT IN AN ORGANIZED HEALTH CARE EDUCATION/TRAINING PROGRAM

## 2023-10-16 PROCEDURE — 85025 COMPLETE CBC W/AUTO DIFF WBC: CPT

## 2023-10-16 PROCEDURE — 84145 PROCALCITONIN (PCT): CPT

## 2023-10-16 PROCEDURE — 93005 ELECTROCARDIOGRAM TRACING: CPT

## 2023-10-16 PROCEDURE — P9045 ALBUMIN (HUMAN), 5%, 250 ML: HCPCS

## 2023-10-16 PROCEDURE — 82803 BLOOD GASES ANY COMBINATION: CPT

## 2023-10-16 PROCEDURE — 94761 N-INVAS EAR/PLS OXIMETRY MLT: CPT

## 2023-10-16 PROCEDURE — A4216 STERILE WATER/SALINE, 10 ML: HCPCS | Performed by: SURGERY

## 2023-10-16 PROCEDURE — 85610 PROTHROMBIN TIME: CPT

## 2023-10-16 PROCEDURE — 85018 HEMOGLOBIN: CPT

## 2023-10-16 PROCEDURE — 2700000000 HC OXYGEN THERAPY PER DAY

## 2023-10-16 PROCEDURE — 6360000002 HC RX W HCPCS: Performed by: STUDENT IN AN ORGANIZED HEALTH CARE EDUCATION/TRAINING PROGRAM

## 2023-10-16 PROCEDURE — 97530 THERAPEUTIC ACTIVITIES: CPT

## 2023-10-16 PROCEDURE — 6360000002 HC RX W HCPCS: Performed by: INTERNAL MEDICINE

## 2023-10-16 PROCEDURE — 6360000002 HC RX W HCPCS: Performed by: SURGERY

## 2023-10-16 PROCEDURE — 80048 BASIC METABOLIC PNL TOTAL CA: CPT

## 2023-10-16 PROCEDURE — 2580000003 HC RX 258: Performed by: SURGERY

## 2023-10-16 PROCEDURE — 83735 ASSAY OF MAGNESIUM: CPT

## 2023-10-16 PROCEDURE — 94667 MNPJ CHEST WALL 1ST: CPT

## 2023-10-16 PROCEDURE — 2580000003 HC RX 258: Performed by: STUDENT IN AN ORGANIZED HEALTH CARE EDUCATION/TRAINING PROGRAM

## 2023-10-16 PROCEDURE — 81001 URINALYSIS AUTO W/SCOPE: CPT

## 2023-10-16 PROCEDURE — 6360000002 HC RX W HCPCS

## 2023-10-16 PROCEDURE — 84100 ASSAY OF PHOSPHORUS: CPT

## 2023-10-16 PROCEDURE — 93010 ELECTROCARDIOGRAM REPORT: CPT | Performed by: INTERNAL MEDICINE

## 2023-10-16 PROCEDURE — 85014 HEMATOCRIT: CPT

## 2023-10-16 PROCEDURE — 82140 ASSAY OF AMMONIA: CPT

## 2023-10-16 PROCEDURE — 82947 ASSAY GLUCOSE BLOOD QUANT: CPT

## 2023-10-16 PROCEDURE — 94668 MNPJ CHEST WALL SBSQ: CPT

## 2023-10-16 PROCEDURE — 36415 COLL VENOUS BLD VENIPUNCTURE: CPT

## 2023-10-16 PROCEDURE — 97110 THERAPEUTIC EXERCISES: CPT

## 2023-10-16 PROCEDURE — 2500000003 HC RX 250 WO HCPCS: Performed by: SURGERY

## 2023-10-16 PROCEDURE — 1200000000 HC SEMI PRIVATE

## 2023-10-16 PROCEDURE — 2580000003 HC RX 258: Performed by: INTERNAL MEDICINE

## 2023-10-16 RX ORDER — SODIUM CHLORIDE, SODIUM LACTATE, POTASSIUM CHLORIDE, AND CALCIUM CHLORIDE .6; .31; .03; .02 G/100ML; G/100ML; G/100ML; G/100ML
1000 INJECTION, SOLUTION INTRAVENOUS ONCE
Status: COMPLETED | OUTPATIENT
Start: 2023-10-16 | End: 2023-10-16

## 2023-10-16 RX ORDER — ALBUMIN, HUMAN INJ 5% 5 %
12.5 SOLUTION INTRAVENOUS ONCE
Status: COMPLETED | OUTPATIENT
Start: 2023-10-16 | End: 2023-10-16

## 2023-10-16 RX ORDER — MAGNESIUM SULFATE IN WATER 40 MG/ML
2000 INJECTION, SOLUTION INTRAVENOUS ONCE
Status: COMPLETED | OUTPATIENT
Start: 2023-10-16 | End: 2023-10-16

## 2023-10-16 RX ORDER — ACETYLCYSTEINE 200 MG/ML
600 SOLUTION ORAL; RESPIRATORY (INHALATION)
Status: DISCONTINUED | OUTPATIENT
Start: 2023-10-16 | End: 2023-10-24

## 2023-10-16 RX ADMIN — SODIUM CHLORIDE, PRESERVATIVE FREE 20 MG: 5 INJECTION INTRAVENOUS at 10:38

## 2023-10-16 RX ADMIN — CEFOXITIN SODIUM 2000 MG: 2 POWDER, FOR SOLUTION INTRAVENOUS at 22:04

## 2023-10-16 RX ADMIN — SODIUM CHLORIDE, PRESERVATIVE FREE 10 ML: 5 INJECTION INTRAVENOUS at 22:00

## 2023-10-16 RX ADMIN — IPRATROPIUM BROMIDE AND ALBUTEROL SULFATE 1 DOSE: 2.5; .5 SOLUTION RESPIRATORY (INHALATION) at 14:28

## 2023-10-16 RX ADMIN — SODIUM CHLORIDE, PRESERVATIVE FREE 20 MG: 5 INJECTION INTRAVENOUS at 22:06

## 2023-10-16 RX ADMIN — IPRATROPIUM BROMIDE AND ALBUTEROL SULFATE 1 DOSE: 2.5; .5 SOLUTION RESPIRATORY (INHALATION) at 08:06

## 2023-10-16 RX ADMIN — ENOXAPARIN SODIUM 40 MG: 100 INJECTION SUBCUTANEOUS at 10:38

## 2023-10-16 RX ADMIN — FUROSEMIDE 20 MG: 10 INJECTION, SOLUTION INTRAMUSCULAR; INTRAVENOUS at 10:38

## 2023-10-16 RX ADMIN — SODIUM CHLORIDE, POTASSIUM CHLORIDE, SODIUM LACTATE AND CALCIUM CHLORIDE 1000 ML: 600; 310; 30; 20 INJECTION, SOLUTION INTRAVENOUS at 16:48

## 2023-10-16 RX ADMIN — MAGNESIUM SULFATE HEPTAHYDRATE 2000 MG: 40 INJECTION, SOLUTION INTRAVENOUS at 10:38

## 2023-10-16 RX ADMIN — SODIUM CHLORIDE: 9 INJECTION, SOLUTION INTRAVENOUS at 22:02

## 2023-10-16 RX ADMIN — METHOCARBAMOL TABLETS 750 MG: 750 TABLET, COATED ORAL at 04:54

## 2023-10-16 RX ADMIN — SODIUM CHLORIDE, PRESERVATIVE FREE 40 MG: 5 INJECTION INTRAVENOUS at 10:38

## 2023-10-16 RX ADMIN — ACETYLCYSTEINE 600 MG: 200 INHALANT RESPIRATORY (INHALATION) at 08:06

## 2023-10-16 RX ADMIN — QUETIAPINE FUMARATE 50 MG: 25 TABLET ORAL at 22:10

## 2023-10-16 RX ADMIN — ALBUMIN (HUMAN) 12.5 G: 12.5 INJECTION, SOLUTION INTRAVENOUS at 12:55

## 2023-10-16 RX ADMIN — ACETYLCYSTEINE 600 MG: 200 INHALANT RESPIRATORY (INHALATION) at 14:28

## 2023-10-17 ENCOUNTER — APPOINTMENT (OUTPATIENT)
Dept: GENERAL RADIOLOGY | Age: 63
DRG: 329 | End: 2023-10-17
Attending: SURGERY
Payer: COMMERCIAL

## 2023-10-17 ENCOUNTER — APPOINTMENT (OUTPATIENT)
Dept: CT IMAGING | Age: 63
DRG: 329 | End: 2023-10-17
Attending: SURGERY
Payer: COMMERCIAL

## 2023-10-17 LAB
AMMONIA PLAS-SCNC: 41 UMOL/L (ref 16–60)
ANION GAP SERPL CALCULATED.3IONS-SCNC: 9 MMOL/L (ref 9–17)
BASOPHILS # BLD: 0 K/UL (ref 0–0.2)
BASOPHILS # BLD: NORMAL K/UL
BASOPHILS NFR BLD: 0 % (ref 0–2)
BASOPHILS NFR BLD: NORMAL %
BUN SERPL-MCNC: 26 MG/DL (ref 8–23)
CALCIUM SERPL-MCNC: 8.5 MG/DL (ref 8.6–10.4)
CHLORIDE SERPL-SCNC: 111 MMOL/L (ref 98–107)
CO2 SERPL-SCNC: 25 MMOL/L (ref 20–31)
CREAT SERPL-MCNC: 0.9 MG/DL (ref 0.7–1.2)
DIFFERENTIAL TYPE: NORMAL
EOSINOPHIL # BLD: 0 K/UL (ref 0–0.4)
EOSINOPHIL # BLD: NORMAL K/UL
EOSINOPHILS RELATIVE PERCENT: 0 % (ref 1–4)
EOSINOPHILS RELATIVE PERCENT: NORMAL %
ERYTHROCYTE [DISTWIDTH] IN BLOOD BY AUTOMATED COUNT: 13.2 % (ref 11.8–14.4)
ERYTHROCYTE [DISTWIDTH] IN BLOOD BY AUTOMATED COUNT: NORMAL %
GFR SERPL CREATININE-BSD FRML MDRD: >60 ML/MIN/1.73M2
GLUCOSE BLD-MCNC: 104 MG/DL (ref 75–110)
GLUCOSE BLD-MCNC: 105 MG/DL (ref 75–110)
GLUCOSE BLD-MCNC: 109 MG/DL (ref 75–110)
GLUCOSE BLD-MCNC: 113 MG/DL (ref 75–110)
GLUCOSE BLD-MCNC: 123 MG/DL (ref 75–110)
GLUCOSE BLD-MCNC: 97 MG/DL (ref 75–110)
GLUCOSE SERPL-MCNC: 105 MG/DL (ref 70–99)
HCT VFR BLD AUTO: 33.9 % (ref 40.7–50.3)
HCT VFR BLD AUTO: NORMAL %
HGB BLD-MCNC: 10.7 G/DL (ref 13–17)
HGB BLD-MCNC: NORMAL G/DL
IMM GRANULOCYTES # BLD AUTO: 0 K/UL (ref 0–0.3)
IMM GRANULOCYTES # BLD AUTO: NORMAL K/UL
IMM GRANULOCYTES NFR BLD: 0 %
IMM GRANULOCYTES NFR BLD: NORMAL %
LYMPHOCYTES NFR BLD: 0.71 K/UL (ref 1–4.8)
LYMPHOCYTES NFR BLD: NORMAL K/UL
LYMPHOCYTES RELATIVE PERCENT: 5 % (ref 24–44)
LYMPHOCYTES RELATIVE PERCENT: NORMAL %
MAGNESIUM SERPL-MCNC: 2 MG/DL (ref 1.6–2.6)
MCH RBC QN AUTO: 33.2 PG (ref 25.2–33.5)
MCH RBC QN AUTO: NORMAL PG
MCHC RBC AUTO-ENTMCNC: 31.6 G/DL (ref 28.4–34.8)
MCHC RBC AUTO-ENTMCNC: NORMAL G/DL
MCV RBC AUTO: 105.3 FL (ref 82.6–102.9)
MCV RBC AUTO: NORMAL FL
MONOCYTES NFR BLD: 0.43 K/UL (ref 0.1–0.8)
MONOCYTES NFR BLD: 3 % (ref 1–7)
MONOCYTES NFR BLD: NORMAL %
MONOCYTES NFR BLD: NORMAL K/UL
MORPHOLOGY: ABNORMAL
NEUTROPHILS NFR BLD: 92 % (ref 36–66)
NEUTROPHILS NFR BLD: NORMAL %
NEUTS SEG NFR BLD: 13.06 K/UL (ref 1.8–7.7)
NEUTS SEG NFR BLD: NORMAL K/UL
NRBC BLD-RTO: 0 PER 100 WBC
NRBC BLD-RTO: NORMAL PER 100 WBC
PHOSPHATE SERPL-MCNC: 3.1 MG/DL (ref 2.5–4.5)
PLATELET # BLD AUTO: 112 K/UL (ref 138–453)
PLATELET # BLD AUTO: NORMAL K/UL
PLATELET ESTIMATE: NORMAL
PLATELET, FLUORESCENCE: NORMAL K/UL
PLATELETS.RETICULATED NFR BLD AUTO: NORMAL %
PMV BLD AUTO: 10.1 FL (ref 8.1–13.5)
PMV BLD AUTO: NORMAL FL
POTASSIUM SERPL-SCNC: 3.5 MMOL/L (ref 3.7–5.3)
RBC # BLD AUTO: 3.22 M/UL (ref 4.21–5.77)
RBC # BLD AUTO: NORMAL M/UL
RBC # BLD: NORMAL 10*6/UL
SODIUM SERPL-SCNC: 145 MMOL/L (ref 135–144)
WBC # BLD: NORMAL 10*3/UL
WBC OTHER # BLD: 14.2 K/UL (ref 3.5–11.3)
WBC OTHER # BLD: NORMAL K/UL

## 2023-10-17 PROCEDURE — 82947 ASSAY GLUCOSE BLOOD QUANT: CPT

## 2023-10-17 PROCEDURE — 84100 ASSAY OF PHOSPHORUS: CPT

## 2023-10-17 PROCEDURE — 6360000002 HC RX W HCPCS: Performed by: SURGERY

## 2023-10-17 PROCEDURE — 2580000003 HC RX 258

## 2023-10-17 PROCEDURE — 94668 MNPJ CHEST WALL SBSQ: CPT

## 2023-10-17 PROCEDURE — 2500000003 HC RX 250 WO HCPCS: Performed by: SURGERY

## 2023-10-17 PROCEDURE — 2700000000 HC OXYGEN THERAPY PER DAY

## 2023-10-17 PROCEDURE — 6360000002 HC RX W HCPCS: Performed by: INTERNAL MEDICINE

## 2023-10-17 PROCEDURE — 74230 X-RAY XM SWLNG FUNCJ C+: CPT

## 2023-10-17 PROCEDURE — 36415 COLL VENOUS BLD VENIPUNCTURE: CPT

## 2023-10-17 PROCEDURE — 6360000002 HC RX W HCPCS: Performed by: STUDENT IN AN ORGANIZED HEALTH CARE EDUCATION/TRAINING PROGRAM

## 2023-10-17 PROCEDURE — 83735 ASSAY OF MAGNESIUM: CPT

## 2023-10-17 PROCEDURE — C9113 INJ PANTOPRAZOLE SODIUM, VIA: HCPCS | Performed by: STUDENT IN AN ORGANIZED HEALTH CARE EDUCATION/TRAINING PROGRAM

## 2023-10-17 PROCEDURE — 6370000000 HC RX 637 (ALT 250 FOR IP): Performed by: STUDENT IN AN ORGANIZED HEALTH CARE EDUCATION/TRAINING PROGRAM

## 2023-10-17 PROCEDURE — 71260 CT THORAX DX C+: CPT

## 2023-10-17 PROCEDURE — 94640 AIRWAY INHALATION TREATMENT: CPT

## 2023-10-17 PROCEDURE — 2580000003 HC RX 258: Performed by: INTERNAL MEDICINE

## 2023-10-17 PROCEDURE — 2580000003 HC RX 258: Performed by: SURGERY

## 2023-10-17 PROCEDURE — 92611 MOTION FLUOROSCOPY/SWALLOW: CPT

## 2023-10-17 PROCEDURE — 85025 COMPLETE CBC W/AUTO DIFF WBC: CPT

## 2023-10-17 PROCEDURE — 6360000002 HC RX W HCPCS

## 2023-10-17 PROCEDURE — 99222 1ST HOSP IP/OBS MODERATE 55: CPT | Performed by: INTERNAL MEDICINE

## 2023-10-17 PROCEDURE — 94761 N-INVAS EAR/PLS OXIMETRY MLT: CPT

## 2023-10-17 PROCEDURE — 71045 X-RAY EXAM CHEST 1 VIEW: CPT

## 2023-10-17 PROCEDURE — 80048 BASIC METABOLIC PNL TOTAL CA: CPT

## 2023-10-17 PROCEDURE — 6370000000 HC RX 637 (ALT 250 FOR IP): Performed by: SURGERY

## 2023-10-17 PROCEDURE — 2580000003 HC RX 258: Performed by: STUDENT IN AN ORGANIZED HEALTH CARE EDUCATION/TRAINING PROGRAM

## 2023-10-17 PROCEDURE — 1200000000 HC SEMI PRIVATE

## 2023-10-17 PROCEDURE — 87040 BLOOD CULTURE FOR BACTERIA: CPT

## 2023-10-17 PROCEDURE — 82140 ASSAY OF AMMONIA: CPT

## 2023-10-17 PROCEDURE — 97535 SELF CARE MNGMENT TRAINING: CPT

## 2023-10-17 PROCEDURE — 6360000004 HC RX CONTRAST MEDICATION: Performed by: STUDENT IN AN ORGANIZED HEALTH CARE EDUCATION/TRAINING PROGRAM

## 2023-10-17 RX ORDER — POTASSIUM CHLORIDE 7.45 MG/ML
10 INJECTION INTRAVENOUS
Status: COMPLETED | OUTPATIENT
Start: 2023-10-17 | End: 2023-10-17

## 2023-10-17 RX ORDER — LORAZEPAM 2 MG/ML
1 INJECTION INTRAMUSCULAR ONCE
Status: COMPLETED | OUTPATIENT
Start: 2023-10-17 | End: 2023-10-17

## 2023-10-17 RX ORDER — IPRATROPIUM BROMIDE AND ALBUTEROL SULFATE 2.5; .5 MG/3ML; MG/3ML
1 SOLUTION RESPIRATORY (INHALATION)
Status: DISCONTINUED | OUTPATIENT
Start: 2023-10-17 | End: 2023-10-21

## 2023-10-17 RX ADMIN — Medication 10 MEQ: at 17:07

## 2023-10-17 RX ADMIN — IPRATROPIUM BROMIDE AND ALBUTEROL SULFATE 1 DOSE: 2.5; .5 SOLUTION RESPIRATORY (INHALATION) at 08:48

## 2023-10-17 RX ADMIN — METHOCARBAMOL TABLETS 750 MG: 750 TABLET, COATED ORAL at 13:35

## 2023-10-17 RX ADMIN — QUETIAPINE FUMARATE 50 MG: 25 TABLET ORAL at 21:43

## 2023-10-17 RX ADMIN — IOPAMIDOL 75 ML: 755 INJECTION, SOLUTION INTRAVENOUS at 05:53

## 2023-10-17 RX ADMIN — WATER 5 MG: 1 INJECTION INTRAMUSCULAR; INTRAVENOUS; SUBCUTANEOUS at 03:14

## 2023-10-17 RX ADMIN — SODIUM CHLORIDE, PRESERVATIVE FREE 20 MG: 5 INJECTION INTRAVENOUS at 10:22

## 2023-10-17 RX ADMIN — ACETYLCYSTEINE 600 MG: 200 INHALANT RESPIRATORY (INHALATION) at 15:19

## 2023-10-17 RX ADMIN — LORAZEPAM 1 MG: 2 INJECTION INTRAMUSCULAR; INTRAVENOUS at 05:32

## 2023-10-17 RX ADMIN — IPRATROPIUM BROMIDE AND ALBUTEROL SULFATE 1 DOSE: 2.5; .5 SOLUTION RESPIRATORY (INHALATION) at 20:23

## 2023-10-17 RX ADMIN — Medication 100 MG: at 10:21

## 2023-10-17 RX ADMIN — ACETYLCYSTEINE 600 MG: 200 INHALANT RESPIRATORY (INHALATION) at 08:48

## 2023-10-17 RX ADMIN — CEFOXITIN SODIUM 2000 MG: 2 POWDER, FOR SOLUTION INTRAVENOUS at 14:57

## 2023-10-17 RX ADMIN — Medication 10 MEQ: at 13:35

## 2023-10-17 RX ADMIN — CEFOXITIN SODIUM 2000 MG: 2 POWDER, FOR SOLUTION INTRAVENOUS at 06:34

## 2023-10-17 RX ADMIN — Medication 10 MEQ: at 16:00

## 2023-10-17 RX ADMIN — CEFOXITIN SODIUM 2000 MG: 2 POWDER, FOR SOLUTION INTRAVENOUS at 22:09

## 2023-10-17 RX ADMIN — NADOLOL 20 MG: 20 TABLET ORAL at 10:21

## 2023-10-17 RX ADMIN — SODIUM CHLORIDE, PRESERVATIVE FREE 40 MG: 5 INJECTION INTRAVENOUS at 21:29

## 2023-10-17 RX ADMIN — ENOXAPARIN SODIUM 40 MG: 100 INJECTION SUBCUTANEOUS at 10:21

## 2023-10-17 RX ADMIN — Medication 10 MEQ: at 14:55

## 2023-10-17 RX ADMIN — SODIUM CHLORIDE, PRESERVATIVE FREE 20 MG: 5 INJECTION INTRAVENOUS at 21:19

## 2023-10-17 RX ADMIN — Medication 6 MG: at 21:43

## 2023-10-17 RX ADMIN — SODIUM CHLORIDE, PRESERVATIVE FREE 40 MG: 5 INJECTION INTRAVENOUS at 10:21

## 2023-10-17 RX ADMIN — ACETYLCYSTEINE 600 MG: 200 INHALANT RESPIRATORY (INHALATION) at 20:23

## 2023-10-17 RX ADMIN — FUROSEMIDE 20 MG: 10 INJECTION, SOLUTION INTRAMUSCULAR; INTRAVENOUS at 10:22

## 2023-10-17 RX ADMIN — METHOCARBAMOL TABLETS 750 MG: 750 TABLET, COATED ORAL at 21:43

## 2023-10-17 RX ADMIN — IPRATROPIUM BROMIDE AND ALBUTEROL SULFATE 1 DOSE: 2.5; .5 SOLUTION RESPIRATORY (INHALATION) at 15:19

## 2023-10-17 RX ADMIN — SPIRONOLACTONE 25 MG: 25 TABLET ORAL at 10:20

## 2023-10-17 RX ADMIN — ALCOHOL 1 TABLET: 70.47 GEL TOPICAL at 10:21

## 2023-10-17 NOTE — CARE COORDINATION
Transitional Planning:  Spoke with ex-wife Nils [place] re: transition plan. She states there is one SNF on [facility] side he does not like. Couldn't remember if it was [facility] or [facility]. She will be up tomorrow to review SNF list.  List left at bedside.

## 2023-10-17 NOTE — CONSULTS
[facility] -   Infectious diseases evaluation  admission date 10/10/2023    reason for consultation:   AB management    Impression :   Current:  Post reversal ileostomy w ileocolonic anastomosis - no complication - 30/57  Altered mentation baseline - stable  Etoh withdrawal - confused  Cirrhosis - TIPS  Bandemia post op  RLL aspiration  pneumonia 10/17 CT    Other:    Discussion / summary of stay / plan of care   ID called for post op AB management - no spillage intra op-but leukocytosis climbing   UA neg  CXR RLL pneumonia  DTs  Recommendations   Start aggressive spirometry   Starting 10/16 cefoxitin high dose   Track WBC for response to tx  DT treatment    Infection Control Recommendations   [place] Precautions  Contact Isolation       Antimicrobial Stewardship Recommendations   Simplification of therapy  Targeted therapy      History of Present Illness:   Initial history:  Rosalva Delgado is a 58y.o.-year-old male past medical history of alcoholic cirrhosis status post TIPS, colonic perforation status post colon ostomy status post reversal hep C, anxiety. Patient initially coming in on October 10 for ostomy reversal due to previously created ostomy secondary to colonic perforation. Since then patient's white blood cell count has been going up. White blood cell count was around 8.5 prior to surgery now is 17. There was no abdominal spillage in the surgery per the op note. Patient has been afebrile. UA has been negative for any UTIs and chest x-ray reveals right lower lobe atelectasis. No blood cultures have been drawn. On exam today patient is awake but does not respond to any questions. Crackles can be heard without stethoscope.     Interval changes  10/17/2023   Patient Vitals for the past 8 hrs:   BP Weight   10/17/23 0600 -- 172 lb 13.5 oz (78.4 kg)   10/17/23 0526 (!) 151/88 --         Summary of relevant labs:  Labs:  W 7 - 14 - 17 -14  Creat

## 2023-10-17 NOTE — PROCEDURES
INSTRUMENTAL SWALLOW REPORT  MODIFIED BARIUM SWALLOW    NAME: Bonilla Harman   : 1960  MRN: [id number]       Date of Eval: 10/17/2023              Referring Diagnosis(es):      Past Medical History:  has a past medical history of Abnormal EKG, Acute deep vein thrombosis (DVT) of brachial vein of right upper extremity ([address]), Adenocarcinoma in a polyp ([address]), Alcoholic cirrhosis of liver with ascites ([address]), Anemia, Anxiety, Arthritis, Back pain, chronic, Lezama esophagus, Bleeding gastric varices, BPH (benign prostatic hypertrophy), Cholelithiasis, Cirrhosis ([address]), COVID-19, COVID-19 vaccine series completed, DDD (degenerative disc disease), lumbar, Depression, Esophageal cancer ([address]), Esophageal varices ([address]), Fatty liver, GERD (gastroesophageal reflux disease), GI bleed, Heart murmur, Hep C w/o coma, chronic ([address]), Hiatal hernia, History of alcohol abuse, History of blood transfusion, History of colon polyps, History of tobacco abuse, [place] (hard of hearing), Hyperlipidemia, Hypertension, Hyponatremia, Hypotension, Mastoid disorder, bilateral, Pericardial effusion, PONV (postoperative nausea and vomiting), Poor venous access, Port-A-Cath in place, Portal hypertension ([address]), Sciatica, Secondary esophageal varices ([address]), Shortness of breath, Spinal stenosis, Stomach ulcer, Thrombocytopenia ([address]), Tubular adenoma of colon, Under care of team, Under care of team, Vitamin D deficiency, and Wears glasses. Past Surgical History:  has a past surgical history that includes Bunionectomy; Nasal septum surgery; other surgical history (2016); Colonoscopy; Colonoscopy (10/05/2016); other surgical history (2016); other surgical history (2016); knee surgery (Left); Bunionectomy (Left); Endoscopy, colon, diagnostic; pr neuroplasty &/transpos median nrv carpal tunne (Right, 2017); Carpal tunnel release (Right); pr neuroplasty &/transpos median nrv carpal tunne (Left, 10/31/2017);  Colonoscopy

## 2023-10-18 LAB
AMMONIA PLAS-SCNC: 35 UMOL/L (ref 16–60)
ANION GAP SERPL CALCULATED.3IONS-SCNC: 10 MMOL/L (ref 9–17)
ANION GAP SERPL CALCULATED.3IONS-SCNC: 6 MMOL/L (ref 9–17)
BASOPHILS # BLD: 0 K/UL (ref 0–0.2)
BASOPHILS NFR BLD: 0 % (ref 0–2)
BUN SERPL-MCNC: 22 MG/DL (ref 8–23)
BUN SERPL-MCNC: 24 MG/DL (ref 8–23)
CALCIUM SERPL-MCNC: 8.6 MG/DL (ref 8.6–10.4)
CALCIUM SERPL-MCNC: 8.6 MG/DL (ref 8.6–10.4)
CHLORIDE SERPL-SCNC: 114 MMOL/L (ref 98–107)
CHLORIDE SERPL-SCNC: 115 MMOL/L (ref 98–107)
CO2 SERPL-SCNC: 23 MMOL/L (ref 20–31)
CO2 SERPL-SCNC: 26 MMOL/L (ref 20–31)
CREAT SERPL-MCNC: 1 MG/DL (ref 0.7–1.2)
CREAT SERPL-MCNC: 1.1 MG/DL (ref 0.7–1.2)
EOSINOPHIL # BLD: 0.14 K/UL (ref 0–0.44)
EOSINOPHILS RELATIVE PERCENT: 1 % (ref 1–4)
ERYTHROCYTE [DISTWIDTH] IN BLOOD BY AUTOMATED COUNT: 13.3 % (ref 11.8–14.4)
GFR SERPL CREATININE-BSD FRML MDRD: >60 ML/MIN/1.73M2
GFR SERPL CREATININE-BSD FRML MDRD: >60 ML/MIN/1.73M2
GLUCOSE BLD-MCNC: 117 MG/DL (ref 75–110)
GLUCOSE BLD-MCNC: 118 MG/DL (ref 75–110)
GLUCOSE BLD-MCNC: 124 MG/DL (ref 75–110)
GLUCOSE BLD-MCNC: 136 MG/DL (ref 75–110)
GLUCOSE BLD-MCNC: 139 MG/DL (ref 75–110)
GLUCOSE BLD-MCNC: 185 MG/DL (ref 75–110)
GLUCOSE SERPL-MCNC: 128 MG/DL (ref 70–99)
GLUCOSE SERPL-MCNC: 147 MG/DL (ref 70–99)
HCT VFR BLD AUTO: 33.8 % (ref 40.7–50.3)
HGB BLD-MCNC: 10.7 G/DL (ref 13–17)
IMM GRANULOCYTES # BLD AUTO: 0.14 K/UL (ref 0–0.3)
IMM GRANULOCYTES NFR BLD: 1 %
INR PPP: 1.5
LYMPHOCYTES NFR BLD: 0.41 K/UL (ref 1.1–3.7)
LYMPHOCYTES RELATIVE PERCENT: 3 % (ref 24–43)
MAGNESIUM SERPL-MCNC: 1.7 MG/DL (ref 1.6–2.6)
MCH RBC QN AUTO: 33.1 PG (ref 25.2–33.5)
MCHC RBC AUTO-ENTMCNC: 31.7 G/DL (ref 28.4–34.8)
MCV RBC AUTO: 104.6 FL (ref 82.6–102.9)
MONOCYTES NFR BLD: 1.49 K/UL (ref 0.1–1.2)
MONOCYTES NFR BLD: 11 % (ref 3–12)
MORPHOLOGY: ABNORMAL
NEUTROPHILS NFR BLD: 84 % (ref 36–65)
NEUTS SEG NFR BLD: 11.32 K/UL (ref 1.5–8.1)
NRBC BLD-RTO: 0 PER 100 WBC
PHOSPHATE SERPL-MCNC: 3 MG/DL (ref 2.5–4.5)
PLATELET # BLD AUTO: 121 K/UL (ref 138–453)
PMV BLD AUTO: 10.4 FL (ref 8.1–13.5)
POTASSIUM SERPL-SCNC: 3.8 MMOL/L (ref 3.7–5.3)
POTASSIUM SERPL-SCNC: 3.8 MMOL/L (ref 3.7–5.3)
PROTHROMBIN TIME: 17.9 SEC (ref 11.7–14.9)
RBC # BLD AUTO: 3.23 M/UL (ref 4.21–5.77)
SODIUM SERPL-SCNC: 146 MMOL/L (ref 135–144)
SODIUM SERPL-SCNC: 148 MMOL/L (ref 135–144)
WBC OTHER # BLD: 13.5 K/UL (ref 3.5–11.3)

## 2023-10-18 PROCEDURE — 84100 ASSAY OF PHOSPHORUS: CPT

## 2023-10-18 PROCEDURE — 82140 ASSAY OF AMMONIA: CPT

## 2023-10-18 PROCEDURE — 99232 SBSQ HOSP IP/OBS MODERATE 35: CPT | Performed by: INTERNAL MEDICINE

## 2023-10-18 PROCEDURE — 31720 CLEARANCE OF AIRWAYS: CPT

## 2023-10-18 PROCEDURE — 6360000002 HC RX W HCPCS: Performed by: STUDENT IN AN ORGANIZED HEALTH CARE EDUCATION/TRAINING PROGRAM

## 2023-10-18 PROCEDURE — 2580000003 HC RX 258: Performed by: STUDENT IN AN ORGANIZED HEALTH CARE EDUCATION/TRAINING PROGRAM

## 2023-10-18 PROCEDURE — 1200000000 HC SEMI PRIVATE

## 2023-10-18 PROCEDURE — 85025 COMPLETE CBC W/AUTO DIFF WBC: CPT

## 2023-10-18 PROCEDURE — 6360000002 HC RX W HCPCS: Performed by: SURGERY

## 2023-10-18 PROCEDURE — 94761 N-INVAS EAR/PLS OXIMETRY MLT: CPT

## 2023-10-18 PROCEDURE — 85610 PROTHROMBIN TIME: CPT

## 2023-10-18 PROCEDURE — 82947 ASSAY GLUCOSE BLOOD QUANT: CPT

## 2023-10-18 PROCEDURE — 6370000000 HC RX 637 (ALT 250 FOR IP): Performed by: SURGERY

## 2023-10-18 PROCEDURE — 6370000000 HC RX 637 (ALT 250 FOR IP): Performed by: STUDENT IN AN ORGANIZED HEALTH CARE EDUCATION/TRAINING PROGRAM

## 2023-10-18 PROCEDURE — 2700000000 HC OXYGEN THERAPY PER DAY

## 2023-10-18 PROCEDURE — 83735 ASSAY OF MAGNESIUM: CPT

## 2023-10-18 PROCEDURE — 80048 BASIC METABOLIC PNL TOTAL CA: CPT

## 2023-10-18 PROCEDURE — 94668 MNPJ CHEST WALL SBSQ: CPT

## 2023-10-18 PROCEDURE — 94640 AIRWAY INHALATION TREATMENT: CPT

## 2023-10-18 PROCEDURE — C9113 INJ PANTOPRAZOLE SODIUM, VIA: HCPCS | Performed by: STUDENT IN AN ORGANIZED HEALTH CARE EDUCATION/TRAINING PROGRAM

## 2023-10-18 PROCEDURE — 36415 COLL VENOUS BLD VENIPUNCTURE: CPT

## 2023-10-18 RX ORDER — DEXTROSE MONOHYDRATE 50 MG/ML
INJECTION, SOLUTION INTRAVENOUS CONTINUOUS
Status: ACTIVE | OUTPATIENT
Start: 2023-10-18 | End: 2023-10-18

## 2023-10-18 RX ORDER — MAGNESIUM SULFATE 1 G/100ML
1000 INJECTION INTRAVENOUS
Status: COMPLETED | OUTPATIENT
Start: 2023-10-18 | End: 2023-10-18

## 2023-10-18 RX ADMIN — Medication 6 MG: at 21:17

## 2023-10-18 RX ADMIN — MAGNESIUM SULFATE HEPTAHYDRATE 1000 MG: 1 INJECTION, SOLUTION INTRAVENOUS at 12:06

## 2023-10-18 RX ADMIN — IPRATROPIUM BROMIDE AND ALBUTEROL SULFATE 1 DOSE: 2.5; .5 SOLUTION RESPIRATORY (INHALATION) at 12:00

## 2023-10-18 RX ADMIN — MAGNESIUM SULFATE HEPTAHYDRATE 1000 MG: 1 INJECTION, SOLUTION INTRAVENOUS at 13:29

## 2023-10-18 RX ADMIN — NADOLOL 20 MG: 20 TABLET ORAL at 10:34

## 2023-10-18 RX ADMIN — ACETYLCYSTEINE 600 MG: 200 INHALANT RESPIRATORY (INHALATION) at 21:02

## 2023-10-18 RX ADMIN — ACETYLCYSTEINE 600 MG: 200 INHALANT RESPIRATORY (INHALATION) at 08:37

## 2023-10-18 RX ADMIN — METHOCARBAMOL TABLETS 750 MG: 750 TABLET, COATED ORAL at 12:07

## 2023-10-18 RX ADMIN — IPRATROPIUM BROMIDE AND ALBUTEROL SULFATE 1 DOSE: 2.5; .5 SOLUTION RESPIRATORY (INHALATION) at 08:37

## 2023-10-18 RX ADMIN — Medication 100 MG: at 10:34

## 2023-10-18 RX ADMIN — ACETYLCYSTEINE 600 MG: 200 INHALANT RESPIRATORY (INHALATION) at 12:00

## 2023-10-18 RX ADMIN — CEFOXITIN SODIUM 2000 MG: 2 POWDER, FOR SOLUTION INTRAVENOUS at 05:43

## 2023-10-18 RX ADMIN — IPRATROPIUM BROMIDE AND ALBUTEROL SULFATE 1 DOSE: 2.5; .5 SOLUTION RESPIRATORY (INHALATION) at 16:00

## 2023-10-18 RX ADMIN — QUETIAPINE FUMARATE 50 MG: 25 TABLET ORAL at 21:17

## 2023-10-18 RX ADMIN — CEFOXITIN SODIUM 2000 MG: 2 POWDER, FOR SOLUTION INTRAVENOUS at 14:43

## 2023-10-18 RX ADMIN — METHOCARBAMOL TABLETS 750 MG: 750 TABLET, COATED ORAL at 21:17

## 2023-10-18 RX ADMIN — ALCOHOL 1 TABLET: 70.47 GEL TOPICAL at 10:34

## 2023-10-18 RX ADMIN — MAGNESIUM SULFATE HEPTAHYDRATE 1000 MG: 1 INJECTION, SOLUTION INTRAVENOUS at 10:54

## 2023-10-18 RX ADMIN — SPIRONOLACTONE 25 MG: 25 TABLET ORAL at 10:33

## 2023-10-18 RX ADMIN — SODIUM CHLORIDE, PRESERVATIVE FREE 40 MG: 5 INJECTION INTRAVENOUS at 10:35

## 2023-10-18 RX ADMIN — IPRATROPIUM BROMIDE AND ALBUTEROL SULFATE 1 DOSE: 2.5; .5 SOLUTION RESPIRATORY (INHALATION) at 21:02

## 2023-10-18 RX ADMIN — DEXTROSE MONOHYDRATE: 50 INJECTION, SOLUTION INTRAVENOUS at 10:49

## 2023-10-18 NOTE — CARE COORDINATION
Transitional Plan:    ID following for IV antx, pt post iliostomy reversal, [place] at bedside, awaiting choices for SNF placement    1628 Call to patients ex spouse for choices, she is in line at [facility] and requests call back

## 2023-10-19 LAB
ANION GAP SERPL CALCULATED.3IONS-SCNC: 10 MMOL/L (ref 9–17)
BASOPHILS # BLD: 0 K/UL (ref 0–0.2)
BASOPHILS NFR BLD: 0 % (ref 0–2)
BUN SERPL-MCNC: 23 MG/DL (ref 8–23)
CALCIUM SERPL-MCNC: 8.4 MG/DL (ref 8.6–10.4)
CHLORIDE SERPL-SCNC: 112 MMOL/L (ref 98–107)
CO2 SERPL-SCNC: 23 MMOL/L (ref 20–31)
CREAT SERPL-MCNC: 0.9 MG/DL (ref 0.7–1.2)
EOSINOPHIL # BLD: 0.13 K/UL (ref 0–0.44)
EOSINOPHILS RELATIVE PERCENT: 1 % (ref 1–4)
ERYTHROCYTE [DISTWIDTH] IN BLOOD BY AUTOMATED COUNT: 13.7 % (ref 11.8–14.4)
GFR SERPL CREATININE-BSD FRML MDRD: >60 ML/MIN/1.73M2
GLUCOSE BLD-MCNC: 107 MG/DL (ref 75–110)
GLUCOSE BLD-MCNC: 111 MG/DL (ref 75–110)
GLUCOSE BLD-MCNC: 120 MG/DL (ref 75–110)
GLUCOSE BLD-MCNC: 124 MG/DL (ref 75–110)
GLUCOSE BLD-MCNC: 125 MG/DL (ref 75–110)
GLUCOSE BLD-MCNC: 132 MG/DL (ref 75–110)
GLUCOSE SERPL-MCNC: 111 MG/DL (ref 70–99)
HCT VFR BLD AUTO: 33.8 % (ref 40.7–50.3)
HGB BLD-MCNC: 10.5 G/DL (ref 13–17)
IMM GRANULOCYTES # BLD AUTO: 0.25 K/UL (ref 0–0.3)
IMM GRANULOCYTES NFR BLD: 2 %
LYMPHOCYTES NFR BLD: 0.38 K/UL (ref 1.1–3.7)
LYMPHOCYTES RELATIVE PERCENT: 3 % (ref 24–43)
MAGNESIUM SERPL-MCNC: 2.1 MG/DL (ref 1.6–2.6)
MCH RBC QN AUTO: 33.7 PG (ref 25.2–33.5)
MCHC RBC AUTO-ENTMCNC: 31.1 G/DL (ref 28.4–34.8)
MCV RBC AUTO: 108.3 FL (ref 82.6–102.9)
MONOCYTES NFR BLD: 1.89 K/UL (ref 0.1–1.2)
MONOCYTES NFR BLD: 15 % (ref 3–12)
MORPHOLOGY: ABNORMAL
NEUTROPHILS NFR BLD: 79 % (ref 36–65)
NEUTS SEG NFR BLD: 9.95 K/UL (ref 1.5–8.1)
NRBC BLD-RTO: 0 PER 100 WBC
PHOSPHATE SERPL-MCNC: 3 MG/DL (ref 2.5–4.5)
PLATELET # BLD AUTO: 103 K/UL (ref 138–453)
PMV BLD AUTO: 10.2 FL (ref 8.1–13.5)
POTASSIUM SERPL-SCNC: 3.9 MMOL/L (ref 3.7–5.3)
RBC # BLD AUTO: 3.12 M/UL (ref 4.21–5.77)
SODIUM SERPL-SCNC: 145 MMOL/L (ref 135–144)
WBC OTHER # BLD: 12.6 K/UL (ref 3.5–11.3)

## 2023-10-19 PROCEDURE — 94640 AIRWAY INHALATION TREATMENT: CPT

## 2023-10-19 PROCEDURE — 97530 THERAPEUTIC ACTIVITIES: CPT

## 2023-10-19 PROCEDURE — 2500000003 HC RX 250 WO HCPCS: Performed by: STUDENT IN AN ORGANIZED HEALTH CARE EDUCATION/TRAINING PROGRAM

## 2023-10-19 PROCEDURE — 2700000000 HC OXYGEN THERAPY PER DAY

## 2023-10-19 PROCEDURE — 2580000003 HC RX 258: Performed by: STUDENT IN AN ORGANIZED HEALTH CARE EDUCATION/TRAINING PROGRAM

## 2023-10-19 PROCEDURE — 85025 COMPLETE CBC W/AUTO DIFF WBC: CPT

## 2023-10-19 PROCEDURE — 82947 ASSAY GLUCOSE BLOOD QUANT: CPT

## 2023-10-19 PROCEDURE — 6370000000 HC RX 637 (ALT 250 FOR IP): Performed by: SURGERY

## 2023-10-19 PROCEDURE — C9113 INJ PANTOPRAZOLE SODIUM, VIA: HCPCS | Performed by: STUDENT IN AN ORGANIZED HEALTH CARE EDUCATION/TRAINING PROGRAM

## 2023-10-19 PROCEDURE — 1200000000 HC SEMI PRIVATE

## 2023-10-19 PROCEDURE — 6360000002 HC RX W HCPCS: Performed by: STUDENT IN AN ORGANIZED HEALTH CARE EDUCATION/TRAINING PROGRAM

## 2023-10-19 PROCEDURE — 80048 BASIC METABOLIC PNL TOTAL CA: CPT

## 2023-10-19 PROCEDURE — 6370000000 HC RX 637 (ALT 250 FOR IP): Performed by: STUDENT IN AN ORGANIZED HEALTH CARE EDUCATION/TRAINING PROGRAM

## 2023-10-19 PROCEDURE — 94761 N-INVAS EAR/PLS OXIMETRY MLT: CPT

## 2023-10-19 PROCEDURE — 84100 ASSAY OF PHOSPHORUS: CPT

## 2023-10-19 PROCEDURE — 83735 ASSAY OF MAGNESIUM: CPT

## 2023-10-19 PROCEDURE — 99232 SBSQ HOSP IP/OBS MODERATE 35: CPT | Performed by: INTERNAL MEDICINE

## 2023-10-19 PROCEDURE — 6360000002 HC RX W HCPCS: Performed by: SURGERY

## 2023-10-19 PROCEDURE — 36415 COLL VENOUS BLD VENIPUNCTURE: CPT

## 2023-10-19 PROCEDURE — 94668 MNPJ CHEST WALL SBSQ: CPT

## 2023-10-19 RX ORDER — DEXTROSE MONOHYDRATE 50 MG/ML
INJECTION, SOLUTION INTRAVENOUS CONTINUOUS
Status: ACTIVE | OUTPATIENT
Start: 2023-10-19 | End: 2023-10-19

## 2023-10-19 RX ORDER — POTASSIUM CHLORIDE 20 MEQ/1
40 TABLET, EXTENDED RELEASE ORAL ONCE
Status: COMPLETED | OUTPATIENT
Start: 2023-10-19 | End: 2023-10-19

## 2023-10-19 RX ADMIN — CEFOXITIN SODIUM 2000 MG: 2 POWDER, FOR SOLUTION INTRAVENOUS at 05:20

## 2023-10-19 RX ADMIN — SPIRONOLACTONE 25 MG: 25 TABLET ORAL at 09:09

## 2023-10-19 RX ADMIN — DEXTROSE MONOHYDRATE: 50 INJECTION, SOLUTION INTRAVENOUS at 10:55

## 2023-10-19 RX ADMIN — IPRATROPIUM BROMIDE AND ALBUTEROL SULFATE 1 DOSE: 2.5; .5 SOLUTION RESPIRATORY (INHALATION) at 15:13

## 2023-10-19 RX ADMIN — FUROSEMIDE 20 MG: 10 INJECTION, SOLUTION INTRAMUSCULAR; INTRAVENOUS at 10:17

## 2023-10-19 RX ADMIN — IPRATROPIUM BROMIDE AND ALBUTEROL SULFATE 1 DOSE: 2.5; .5 SOLUTION RESPIRATORY (INHALATION) at 12:48

## 2023-10-19 RX ADMIN — ACETYLCYSTEINE 600 MG: 200 INHALANT RESPIRATORY (INHALATION) at 09:46

## 2023-10-19 RX ADMIN — IPRATROPIUM BROMIDE AND ALBUTEROL SULFATE 1 DOSE: 2.5; .5 SOLUTION RESPIRATORY (INHALATION) at 21:05

## 2023-10-19 RX ADMIN — METHOCARBAMOL TABLETS 750 MG: 750 TABLET, COATED ORAL at 20:46

## 2023-10-19 RX ADMIN — ENOXAPARIN SODIUM 40 MG: 100 INJECTION SUBCUTANEOUS at 09:07

## 2023-10-19 RX ADMIN — NADOLOL 20 MG: 20 TABLET ORAL at 09:10

## 2023-10-19 RX ADMIN — CEFOXITIN SODIUM 2000 MG: 2 POWDER, FOR SOLUTION INTRAVENOUS at 12:31

## 2023-10-19 RX ADMIN — Medication 6 MG: at 20:46

## 2023-10-19 RX ADMIN — SODIUM CHLORIDE, PRESERVATIVE FREE 40 MG: 5 INJECTION INTRAVENOUS at 09:35

## 2023-10-19 RX ADMIN — IPRATROPIUM BROMIDE AND ALBUTEROL SULFATE 1 DOSE: 2.5; .5 SOLUTION RESPIRATORY (INHALATION) at 09:45

## 2023-10-19 RX ADMIN — METHOCARBAMOL TABLETS 750 MG: 750 TABLET, COATED ORAL at 05:16

## 2023-10-19 RX ADMIN — ACETYLCYSTEINE 600 MG: 200 INHALANT RESPIRATORY (INHALATION) at 21:05

## 2023-10-19 RX ADMIN — ACETYLCYSTEINE 600 MG: 200 INHALANT RESPIRATORY (INHALATION) at 15:13

## 2023-10-19 RX ADMIN — ALCOHOL 1 TABLET: 70.47 GEL TOPICAL at 09:09

## 2023-10-19 RX ADMIN — QUETIAPINE FUMARATE 50 MG: 25 TABLET ORAL at 20:46

## 2023-10-19 RX ADMIN — SODIUM CHLORIDE, PRESERVATIVE FREE 40 MG: 5 INJECTION INTRAVENOUS at 20:46

## 2023-10-19 RX ADMIN — POTASSIUM CHLORIDE 40 MEQ: 1500 TABLET, EXTENDED RELEASE ORAL at 09:17

## 2023-10-19 RX ADMIN — DEXTROSE MONOHYDRATE, SODIUM CHLORIDE, AND POTASSIUM CHLORIDE: 50; 4.5; 1.49 INJECTION, SOLUTION INTRAVENOUS at 02:18

## 2023-10-19 RX ADMIN — Medication 100 MG: at 09:09

## 2023-10-19 ASSESSMENT — ENCOUNTER SYMPTOMS
VOMITING: 0
NAUSEA: 1
DIARRHEA: 0

## 2023-10-19 NOTE — CARE COORDINATION
Transitional planning    Call from Samantha with [facility], will be in tomorrow to do onsite eval.

## 2023-10-19 NOTE — CARE COORDINATION
Call to [facility] with Carol Garcia to verify they received referral, Lydia Coleman was to come up and do onsite, she will speak with Lydia Coleman and get back with us, [facility] will need to be off 24H if they accept    [phone] Call to [phone] Alexei Abbasi in intake at [facility] to see if they received referral, she spoke with \"her boss\" and they are reviewing it.      Spoke with Keyla Angel RN he will take pt off [facility] today as the patient is more appropriate    1316 Call from [address] at [facility] they cannot accept pt and no reason given

## 2023-10-19 NOTE — CARE COORDINATION
Call from [facility] at [facility] they cannot accept pt relates to ETOH and substance use

## 2023-10-19 NOTE — DISCHARGE INSTR - COC
Continuity of Care Form    Patient Name: Tino Shaver   :    MRN:  [id number]    Admit date:  10/10/2023  Discharge date:  10/25/2023    Code Status Order: Full Code   Advance Directives:   [address] Documentation       Date/Time Healthcare Directive Type of Healthcare Directive Copy in [address] Agent's Name Healthcare Agent's Phone Number    10/10/23 1216 Yes, patient has an advance directive for healthcare treatment Durable power of  for health care Yes, copy in chart Healthcare power of  Nasra Tinsleyelor --            Admitting Physician:  Rayna Arredondo DO  PCP: Kristal Flanagan    Discharging Nurse: Josh Raymundo, [address] Unit/Room#: 9654/3628-06  Discharging Unit Phone Number:     Emergency Contact:   Extended Emergency Contact Information  Primary Emergency Contact: Singh [address]. Phone: [phone]  Work Phone: [phone]  Mobile Phone: [phone]  Relation: Other    Past Surgical History:  Past Surgical History:   Procedure Laterality Date    BUNIONECTOMY      twice on right side    BUNIONECTOMY Left     CARPAL TUNNEL RELEASE Right     COLON SURGERY  10/10/2023    EXPLORATORY LAPAROTOMY, REVERSAL ILEOSTOMY WITH ILEOCOLOSTOMY, LYSIS OF ADHESIONS,    COLONOSCOPY      at age 35    COLONOSCOPY  10/05/2016    polyps-pathology tubular adenoma, and abnormal looking mucosa right colon-pathology-tubular adenoma    COLONOSCOPY N/A 2018    COLONOSCOPY POLYPECTOMY COLD BIOPSY performed by Jasper Uriarte MD at [address] [place] [address]  2018    Small polyp in the sigmoid colon and excised with biopsy forceps--tubular adenoma    COLONOSCOPY N/A 2022    COLONOSCOPY POLYPECTOMY performed by Jasper Uriarte MD at [address] [facility] [address], COLON, DIAGNOSTIC      EGD    ESOPHAGOGASTRODUODENOSCOPY  2022    ESOPHAGOGASTRODUODENOSCOPY N/A 2023    IR PORT PLACEMENT EQUAL

## 2023-10-19 NOTE — PROCEDURES
BRONCHOSPASM/BRONCHOCONSTRICTION     [x]         IMPROVE AERATION/BREATH SOUNDS  []   ADMINISTER BRONCHODILATOR THERAPY AS APPROPRIATE  [x]   ASSESS BREATH SOUNDS  []   IMPLEMENT AEROSOL/MDI PROTOCOL  [x]   PATIENT EDUCATION AS NEEDED   MOBILIZE SECRETIONS    [x]   ASSESS BREATH SOUNDS  [x]   ASSESS SPUTUM PRODUCTION  [x]   COUGH AND DEEP BREATHING  [x]  IMPLEMENT SECRETION MANAGEMENT PROTOCOL  [x]   PATIENT EDUCATION AS NEEDED

## 2023-10-20 LAB
ANION GAP SERPL CALCULATED.3IONS-SCNC: 11 MMOL/L (ref 9–17)
BASOPHILS # BLD: 0 K/UL (ref 0–0.2)
BASOPHILS NFR BLD: 0 % (ref 0–2)
BUN SERPL-MCNC: 20 MG/DL (ref 8–23)
CALCIUM SERPL-MCNC: 8.1 MG/DL (ref 8.6–10.4)
CHLORIDE SERPL-SCNC: 106 MMOL/L (ref 98–107)
CO2 SERPL-SCNC: 21 MMOL/L (ref 20–31)
CREAT SERPL-MCNC: 1 MG/DL (ref 0.7–1.2)
EOSINOPHIL # BLD: 0.18 K/UL (ref 0–0.4)
EOSINOPHILS RELATIVE PERCENT: 1 % (ref 1–4)
ERYTHROCYTE [DISTWIDTH] IN BLOOD BY AUTOMATED COUNT: 13.5 % (ref 11.8–14.4)
GFR SERPL CREATININE-BSD FRML MDRD: >60 ML/MIN/1.73M2
GLUCOSE SERPL-MCNC: 123 MG/DL (ref 70–99)
HCT VFR BLD AUTO: 33.1 % (ref 40.7–50.3)
HGB BLD-MCNC: 10.8 G/DL (ref 13–17)
IMM GRANULOCYTES # BLD AUTO: 0.35 K/UL (ref 0–0.3)
IMM GRANULOCYTES NFR BLD: 2 %
LYMPHOCYTES NFR BLD: 0.7 K/UL (ref 1–4.8)
LYMPHOCYTES RELATIVE PERCENT: 4 % (ref 24–44)
MCH RBC QN AUTO: 32.5 PG (ref 25.2–33.5)
MCHC RBC AUTO-ENTMCNC: 32.6 G/DL (ref 28.4–34.8)
MCV RBC AUTO: 99.7 FL (ref 82.6–102.9)
MONOCYTES NFR BLD: 1.93 K/UL (ref 0.1–0.8)
MONOCYTES NFR BLD: 11 % (ref 1–7)
MORPHOLOGY: ABNORMAL
NEUTROPHILS NFR BLD: 82 % (ref 36–66)
NEUTS SEG NFR BLD: 14.34 K/UL (ref 1.8–7.7)
NRBC BLD-RTO: 0 PER 100 WBC
PLATELET # BLD AUTO: ABNORMAL K/UL (ref 138–453)
PLATELET, FLUORESCENCE: 125 K/UL (ref 138–453)
PLATELETS.RETICULATED NFR BLD AUTO: 5.2 % (ref 1.1–10.3)
POTASSIUM SERPL-SCNC: 4.3 MMOL/L (ref 3.7–5.3)
RBC # BLD AUTO: 3.32 M/UL (ref 4.21–5.77)
SODIUM SERPL-SCNC: 138 MMOL/L (ref 135–144)
WBC OTHER # BLD: 17.5 K/UL (ref 3.5–11.3)

## 2023-10-20 PROCEDURE — 80048 BASIC METABOLIC PNL TOTAL CA: CPT

## 2023-10-20 PROCEDURE — 85025 COMPLETE CBC W/AUTO DIFF WBC: CPT

## 2023-10-20 PROCEDURE — 97535 SELF CARE MNGMENT TRAINING: CPT

## 2023-10-20 PROCEDURE — 6370000000 HC RX 637 (ALT 250 FOR IP): Performed by: STUDENT IN AN ORGANIZED HEALTH CARE EDUCATION/TRAINING PROGRAM

## 2023-10-20 PROCEDURE — 85055 RETICULATED PLATELET ASSAY: CPT

## 2023-10-20 PROCEDURE — 6370000000 HC RX 637 (ALT 250 FOR IP): Performed by: SURGERY

## 2023-10-20 PROCEDURE — 94761 N-INVAS EAR/PLS OXIMETRY MLT: CPT

## 2023-10-20 PROCEDURE — 36415 COLL VENOUS BLD VENIPUNCTURE: CPT

## 2023-10-20 PROCEDURE — 6360000002 HC RX W HCPCS: Performed by: STUDENT IN AN ORGANIZED HEALTH CARE EDUCATION/TRAINING PROGRAM

## 2023-10-20 PROCEDURE — 97530 THERAPEUTIC ACTIVITIES: CPT

## 2023-10-20 PROCEDURE — 1200000000 HC SEMI PRIVATE

## 2023-10-20 PROCEDURE — 6370000000 HC RX 637 (ALT 250 FOR IP): Performed by: INTERNAL MEDICINE

## 2023-10-20 PROCEDURE — 2700000000 HC OXYGEN THERAPY PER DAY

## 2023-10-20 PROCEDURE — 99232 SBSQ HOSP IP/OBS MODERATE 35: CPT | Performed by: INTERNAL MEDICINE

## 2023-10-20 PROCEDURE — 94669 MECHANICAL CHEST WALL OSCILL: CPT

## 2023-10-20 PROCEDURE — 94640 AIRWAY INHALATION TREATMENT: CPT

## 2023-10-20 RX ORDER — DRONABINOL 2.5 MG/1
2.5 CAPSULE ORAL 2 TIMES DAILY
Status: DISCONTINUED | OUTPATIENT
Start: 2023-10-20 | End: 2023-10-25 | Stop reason: HOSPADM

## 2023-10-20 RX ORDER — FAMOTIDINE 20 MG/1
20 TABLET, FILM COATED ORAL 2 TIMES DAILY
Status: DISCONTINUED | OUTPATIENT
Start: 2023-10-20 | End: 2023-10-25 | Stop reason: HOSPADM

## 2023-10-20 RX ORDER — LACTOBACILLUS RHAMNOSUS GG 10B CELL
1 CAPSULE ORAL DAILY
Status: DISCONTINUED | OUTPATIENT
Start: 2023-10-20 | End: 2023-10-25 | Stop reason: HOSPADM

## 2023-10-20 RX ORDER — MOXIFLOXACIN HYDROCHLORIDE 400 MG/1
400 TABLET ORAL DAILY
Status: COMPLETED | OUTPATIENT
Start: 2023-10-20 | End: 2023-10-22

## 2023-10-20 RX ORDER — MOXIFLOXACIN HYDROCHLORIDE 400 MG/1
400 TABLET ORAL DAILY
Qty: 3 TABLET | Refills: 0 | Status: SHIPPED | OUTPATIENT
Start: 2023-10-20 | End: 2023-10-23

## 2023-10-20 RX ADMIN — DRONABINOL 2.5 MG: 2.5 CAPSULE ORAL at 10:01

## 2023-10-20 RX ADMIN — ACETYLCYSTEINE 600 MG: 200 INHALANT RESPIRATORY (INHALATION) at 09:10

## 2023-10-20 RX ADMIN — IPRATROPIUM BROMIDE AND ALBUTEROL SULFATE 1 DOSE: 2.5; .5 SOLUTION RESPIRATORY (INHALATION) at 09:10

## 2023-10-20 RX ADMIN — FAMOTIDINE 20 MG: 20 TABLET, FILM COATED ORAL at 10:02

## 2023-10-20 RX ADMIN — ACETYLCYSTEINE 600 MG: 200 INHALANT RESPIRATORY (INHALATION) at 16:00

## 2023-10-20 RX ADMIN — NADOLOL 20 MG: 20 TABLET ORAL at 10:01

## 2023-10-20 RX ADMIN — QUETIAPINE FUMARATE 50 MG: 25 TABLET ORAL at 20:41

## 2023-10-20 RX ADMIN — IPRATROPIUM BROMIDE AND ALBUTEROL SULFATE 1 DOSE: 2.5; .5 SOLUTION RESPIRATORY (INHALATION) at 12:17

## 2023-10-20 RX ADMIN — ALCOHOL 1 TABLET: 70.47 GEL TOPICAL at 10:01

## 2023-10-20 RX ADMIN — Medication 100 MG: at 10:02

## 2023-10-20 RX ADMIN — MOXIFLOXACIN HYDROCHLORIDE 400 MG: 400 TABLET, FILM COATED ORAL at 13:07

## 2023-10-20 RX ADMIN — SPIRONOLACTONE 25 MG: 25 TABLET ORAL at 10:01

## 2023-10-20 RX ADMIN — IPRATROPIUM BROMIDE AND ALBUTEROL SULFATE 1 DOSE: 2.5; .5 SOLUTION RESPIRATORY (INHALATION) at 19:52

## 2023-10-20 RX ADMIN — Medication 1 CAPSULE: at 13:07

## 2023-10-20 RX ADMIN — Medication 6 MG: at 20:41

## 2023-10-20 RX ADMIN — ENOXAPARIN SODIUM 40 MG: 100 INJECTION SUBCUTANEOUS at 10:05

## 2023-10-20 RX ADMIN — IPRATROPIUM BROMIDE AND ALBUTEROL SULFATE 1 DOSE: 2.5; .5 SOLUTION RESPIRATORY (INHALATION) at 16:00

## 2023-10-20 RX ADMIN — DRONABINOL 2.5 MG: 2.5 CAPSULE ORAL at 20:41

## 2023-10-20 RX ADMIN — ACETYLCYSTEINE 600 MG: 200 INHALANT RESPIRATORY (INHALATION) at 19:52

## 2023-10-20 RX ADMIN — FAMOTIDINE 20 MG: 20 TABLET, FILM COATED ORAL at 20:41

## 2023-10-20 ASSESSMENT — ENCOUNTER SYMPTOMS
DIARRHEA: 1
NAUSEA: 1
VOMITING: 0

## 2023-10-20 NOTE — CARE COORDINATION
Transitional Planning:  Spoke with C[place] at [facility]. They cannot accept as they have no available beds near nurses station. 10:52  Call to [place] at [facility]. They are reviewing. Patient will need to be sitter free X 24 hrs.

## 2023-10-20 NOTE — CONSULTS
VAT consulted to assess patient for PIV. Ultrasound showed patient had limited access. RN notified at this time. RN updated primary physician.

## 2023-10-21 LAB
ANION GAP SERPL CALCULATED.3IONS-SCNC: 9 MMOL/L (ref 9–17)
BASOPHILS # BLD: 0 K/UL (ref 0–0.2)
BASOPHILS NFR BLD: 0 % (ref 0–2)
BUN SERPL-MCNC: 20 MG/DL (ref 8–23)
CALCIUM SERPL-MCNC: 8.1 MG/DL (ref 8.6–10.4)
CHLORIDE SERPL-SCNC: 105 MMOL/L (ref 98–107)
CO2 SERPL-SCNC: 22 MMOL/L (ref 20–31)
CREAT SERPL-MCNC: 0.9 MG/DL (ref 0.7–1.2)
EOSINOPHIL # BLD: 0.15 K/UL (ref 0–0.4)
EOSINOPHILS RELATIVE PERCENT: 1 % (ref 1–4)
ERYTHROCYTE [DISTWIDTH] IN BLOOD BY AUTOMATED COUNT: 13.5 % (ref 11.8–14.4)
GFR SERPL CREATININE-BSD FRML MDRD: >60 ML/MIN/1.73M2
GLUCOSE BLD-MCNC: 131 MG/DL (ref 75–110)
GLUCOSE BLD-MCNC: 132 MG/DL (ref 75–110)
GLUCOSE SERPL-MCNC: 107 MG/DL (ref 70–99)
HCT VFR BLD AUTO: 33.6 % (ref 40.7–50.3)
HGB BLD-MCNC: 10.7 G/DL (ref 13–17)
IMM GRANULOCYTES # BLD AUTO: 0.31 K/UL (ref 0–0.3)
IMM GRANULOCYTES NFR BLD: 2 %
LYMPHOCYTES NFR BLD: 0.92 K/UL (ref 1–4.8)
LYMPHOCYTES RELATIVE PERCENT: 6 % (ref 24–44)
MCH RBC QN AUTO: 32.3 PG (ref 25.2–33.5)
MCHC RBC AUTO-ENTMCNC: 31.8 G/DL (ref 28.4–34.8)
MCV RBC AUTO: 101.5 FL (ref 82.6–102.9)
MONOCYTES NFR BLD: 0.77 K/UL (ref 0.1–0.8)
MONOCYTES NFR BLD: 5 % (ref 1–7)
MORPHOLOGY: NORMAL
NEUTROPHILS NFR BLD: 86 % (ref 36–66)
NEUTS SEG NFR BLD: 13.25 K/UL (ref 1.8–7.7)
NRBC BLD-RTO: 0 PER 100 WBC
PLATELET # BLD AUTO: ABNORMAL K/UL (ref 138–453)
PLATELET, FLUORESCENCE: 145 K/UL (ref 138–453)
PLATELETS.RETICULATED NFR BLD AUTO: 7.6 % (ref 1.1–10.3)
POTASSIUM SERPL-SCNC: 4.3 MMOL/L (ref 3.7–5.3)
RBC # BLD AUTO: 3.31 M/UL (ref 4.21–5.77)
SODIUM SERPL-SCNC: 136 MMOL/L (ref 135–144)
WBC OTHER # BLD: 15.4 K/UL (ref 3.5–11.3)

## 2023-10-21 PROCEDURE — 1200000000 HC SEMI PRIVATE

## 2023-10-21 PROCEDURE — 94669 MECHANICAL CHEST WALL OSCILL: CPT

## 2023-10-21 PROCEDURE — 94761 N-INVAS EAR/PLS OXIMETRY MLT: CPT

## 2023-10-21 PROCEDURE — 6360000002 HC RX W HCPCS: Performed by: STUDENT IN AN ORGANIZED HEALTH CARE EDUCATION/TRAINING PROGRAM

## 2023-10-21 PROCEDURE — 6370000000 HC RX 637 (ALT 250 FOR IP): Performed by: STUDENT IN AN ORGANIZED HEALTH CARE EDUCATION/TRAINING PROGRAM

## 2023-10-21 PROCEDURE — 85055 RETICULATED PLATELET ASSAY: CPT

## 2023-10-21 PROCEDURE — 6370000000 HC RX 637 (ALT 250 FOR IP)

## 2023-10-21 PROCEDURE — 6370000000 HC RX 637 (ALT 250 FOR IP): Performed by: INTERNAL MEDICINE

## 2023-10-21 PROCEDURE — 2700000000 HC OXYGEN THERAPY PER DAY

## 2023-10-21 PROCEDURE — 85025 COMPLETE CBC W/AUTO DIFF WBC: CPT

## 2023-10-21 PROCEDURE — 99232 SBSQ HOSP IP/OBS MODERATE 35: CPT | Performed by: INTERNAL MEDICINE

## 2023-10-21 PROCEDURE — 6370000000 HC RX 637 (ALT 250 FOR IP): Performed by: SURGERY

## 2023-10-21 PROCEDURE — 80048 BASIC METABOLIC PNL TOTAL CA: CPT

## 2023-10-21 PROCEDURE — 94640 AIRWAY INHALATION TREATMENT: CPT

## 2023-10-21 PROCEDURE — 36415 COLL VENOUS BLD VENIPUNCTURE: CPT

## 2023-10-21 PROCEDURE — 82947 ASSAY GLUCOSE BLOOD QUANT: CPT

## 2023-10-21 RX ORDER — ALBUTEROL SULFATE 2.5 MG/3ML
2.5 SOLUTION RESPIRATORY (INHALATION)
Status: DISCONTINUED | OUTPATIENT
Start: 2023-10-21 | End: 2023-10-25 | Stop reason: HOSPADM

## 2023-10-21 RX ORDER — FUROSEMIDE 20 MG/1
20 TABLET ORAL DAILY
Status: DISCONTINUED | OUTPATIENT
Start: 2023-10-21 | End: 2023-10-25 | Stop reason: HOSPADM

## 2023-10-21 RX ORDER — IPRATROPIUM BROMIDE AND ALBUTEROL SULFATE 2.5; .5 MG/3ML; MG/3ML
1 SOLUTION RESPIRATORY (INHALATION)
Status: DISCONTINUED | OUTPATIENT
Start: 2023-10-21 | End: 2023-10-25 | Stop reason: HOSPADM

## 2023-10-21 RX ADMIN — SPIRONOLACTONE 25 MG: 25 TABLET ORAL at 09:37

## 2023-10-21 RX ADMIN — MOXIFLOXACIN HYDROCHLORIDE 400 MG: 400 TABLET, FILM COATED ORAL at 09:37

## 2023-10-21 RX ADMIN — ENOXAPARIN SODIUM 40 MG: 100 INJECTION SUBCUTANEOUS at 09:37

## 2023-10-21 RX ADMIN — DRONABINOL 2.5 MG: 2.5 CAPSULE ORAL at 09:37

## 2023-10-21 RX ADMIN — DRONABINOL 2.5 MG: 2.5 CAPSULE ORAL at 20:47

## 2023-10-21 RX ADMIN — IPRATROPIUM BROMIDE AND ALBUTEROL SULFATE 1 DOSE: 2.5; .5 SOLUTION RESPIRATORY (INHALATION) at 19:46

## 2023-10-21 RX ADMIN — ACETYLCYSTEINE 600 MG: 200 INHALANT RESPIRATORY (INHALATION) at 08:08

## 2023-10-21 RX ADMIN — Medication 1 CAPSULE: at 09:37

## 2023-10-21 RX ADMIN — QUETIAPINE FUMARATE 50 MG: 25 TABLET ORAL at 20:47

## 2023-10-21 RX ADMIN — NADOLOL 20 MG: 20 TABLET ORAL at 09:37

## 2023-10-21 RX ADMIN — ALBUTEROL SULFATE 2.5 MG: 2.5 SOLUTION RESPIRATORY (INHALATION) at 14:57

## 2023-10-21 RX ADMIN — ACETYLCYSTEINE 600 MG: 200 INHALANT RESPIRATORY (INHALATION) at 19:46

## 2023-10-21 RX ADMIN — ACETYLCYSTEINE 600 MG: 200 INHALANT RESPIRATORY (INHALATION) at 14:57

## 2023-10-21 RX ADMIN — IPRATROPIUM BROMIDE AND ALBUTEROL SULFATE 1 DOSE: 2.5; .5 SOLUTION RESPIRATORY (INHALATION) at 08:08

## 2023-10-21 RX ADMIN — FAMOTIDINE 20 MG: 20 TABLET, FILM COATED ORAL at 09:37

## 2023-10-21 RX ADMIN — ONDANSETRON 4 MG: 4 TABLET, ORALLY DISINTEGRATING ORAL at 20:07

## 2023-10-21 RX ADMIN — FAMOTIDINE 20 MG: 20 TABLET, FILM COATED ORAL at 20:46

## 2023-10-21 RX ADMIN — Medication 6 MG: at 20:47

## 2023-10-21 RX ADMIN — ALCOHOL 1 TABLET: 70.47 GEL TOPICAL at 09:37

## 2023-10-21 RX ADMIN — FUROSEMIDE 20 MG: 20 TABLET ORAL at 14:08

## 2023-10-21 RX ADMIN — Medication 100 MG: at 09:37

## 2023-10-21 ASSESSMENT — ENCOUNTER SYMPTOMS
APNEA: 0
DIARRHEA: 1
EYE DISCHARGE: 0
VOMITING: 0
NAUSEA: 0

## 2023-10-22 LAB
ANION GAP SERPL CALCULATED.3IONS-SCNC: 9 MMOL/L (ref 9–17)
BASOPHILS # BLD: 0 K/UL (ref 0–0.2)
BASOPHILS NFR BLD: 0 % (ref 0–2)
BUN SERPL-MCNC: 17 MG/DL (ref 8–23)
CALCIUM SERPL-MCNC: 7.7 MG/DL (ref 8.6–10.4)
CHLORIDE SERPL-SCNC: 105 MMOL/L (ref 98–107)
CO2 SERPL-SCNC: 18 MMOL/L (ref 20–31)
CREAT SERPL-MCNC: 0.9 MG/DL (ref 0.7–1.2)
EOSINOPHIL # BLD: 0 K/UL (ref 0–0.4)
EOSINOPHILS RELATIVE PERCENT: 0 % (ref 1–4)
ERYTHROCYTE [DISTWIDTH] IN BLOOD BY AUTOMATED COUNT: 13.4 % (ref 11.8–14.4)
GFR SERPL CREATININE-BSD FRML MDRD: >60 ML/MIN/1.73M2
GLUCOSE BLD-MCNC: 104 MG/DL (ref 75–110)
GLUCOSE BLD-MCNC: 118 MG/DL (ref 75–110)
GLUCOSE SERPL-MCNC: 120 MG/DL (ref 70–99)
HCT VFR BLD AUTO: 33.6 % (ref 40.7–50.3)
HGB BLD-MCNC: 10.3 G/DL (ref 13–17)
IMM GRANULOCYTES # BLD AUTO: 0.14 K/UL (ref 0–0.3)
IMM GRANULOCYTES NFR BLD: 1 %
LYMPHOCYTES NFR BLD: 1.36 K/UL (ref 1–4.8)
LYMPHOCYTES RELATIVE PERCENT: 10 % (ref 24–44)
MCH RBC QN AUTO: 32.5 PG (ref 25.2–33.5)
MCHC RBC AUTO-ENTMCNC: 30.7 G/DL (ref 28.4–34.8)
MCV RBC AUTO: 106 FL (ref 82.6–102.9)
MICROORGANISM SPEC CULT: NORMAL
MICROORGANISM SPEC CULT: NORMAL
MONOCYTES NFR BLD: 1.5 K/UL (ref 0.1–0.8)
MONOCYTES NFR BLD: 11 % (ref 1–7)
MORPHOLOGY: ABNORMAL
NEUTROPHILS NFR BLD: 78 % (ref 36–66)
NEUTS SEG NFR BLD: 10.6 K/UL (ref 1.8–7.7)
NRBC BLD-RTO: 0 PER 100 WBC
PLATELET # BLD AUTO: 160 K/UL (ref 138–453)
PMV BLD AUTO: 11 FL (ref 8.1–13.5)
POTASSIUM SERPL-SCNC: 3.8 MMOL/L (ref 3.7–5.3)
RBC # BLD AUTO: 3.17 M/UL (ref 4.21–5.77)
SERVICE CMNT-IMP: NORMAL
SERVICE CMNT-IMP: NORMAL
SODIUM SERPL-SCNC: 132 MMOL/L (ref 135–144)
SPECIMEN DESCRIPTION: NORMAL
SPECIMEN DESCRIPTION: NORMAL
WBC OTHER # BLD: 13.6 K/UL (ref 3.5–11.3)

## 2023-10-22 PROCEDURE — 6360000002 HC RX W HCPCS: Performed by: STUDENT IN AN ORGANIZED HEALTH CARE EDUCATION/TRAINING PROGRAM

## 2023-10-22 PROCEDURE — 1200000000 HC SEMI PRIVATE

## 2023-10-22 PROCEDURE — 94669 MECHANICAL CHEST WALL OSCILL: CPT

## 2023-10-22 PROCEDURE — 82947 ASSAY GLUCOSE BLOOD QUANT: CPT

## 2023-10-22 PROCEDURE — 2700000000 HC OXYGEN THERAPY PER DAY

## 2023-10-22 PROCEDURE — 85025 COMPLETE CBC W/AUTO DIFF WBC: CPT

## 2023-10-22 PROCEDURE — 6370000000 HC RX 637 (ALT 250 FOR IP): Performed by: INTERNAL MEDICINE

## 2023-10-22 PROCEDURE — 6370000000 HC RX 637 (ALT 250 FOR IP): Performed by: STUDENT IN AN ORGANIZED HEALTH CARE EDUCATION/TRAINING PROGRAM

## 2023-10-22 PROCEDURE — 6370000000 HC RX 637 (ALT 250 FOR IP): Performed by: SURGERY

## 2023-10-22 PROCEDURE — 94640 AIRWAY INHALATION TREATMENT: CPT

## 2023-10-22 PROCEDURE — 94761 N-INVAS EAR/PLS OXIMETRY MLT: CPT

## 2023-10-22 PROCEDURE — 80048 BASIC METABOLIC PNL TOTAL CA: CPT

## 2023-10-22 PROCEDURE — 36415 COLL VENOUS BLD VENIPUNCTURE: CPT

## 2023-10-22 PROCEDURE — 6370000000 HC RX 637 (ALT 250 FOR IP)

## 2023-10-22 RX ADMIN — MOXIFLOXACIN HYDROCHLORIDE 400 MG: 400 TABLET, FILM COATED ORAL at 08:18

## 2023-10-22 RX ADMIN — FAMOTIDINE 20 MG: 20 TABLET, FILM COATED ORAL at 08:18

## 2023-10-22 RX ADMIN — DRONABINOL 2.5 MG: 2.5 CAPSULE ORAL at 20:40

## 2023-10-22 RX ADMIN — ALCOHOL 1 TABLET: 70.47 GEL TOPICAL at 08:18

## 2023-10-22 RX ADMIN — NADOLOL 20 MG: 20 TABLET ORAL at 08:19

## 2023-10-22 RX ADMIN — FUROSEMIDE 20 MG: 20 TABLET ORAL at 08:18

## 2023-10-22 RX ADMIN — SPIRONOLACTONE 25 MG: 25 TABLET ORAL at 08:18

## 2023-10-22 RX ADMIN — ACETYLCYSTEINE 600 MG: 200 INHALANT RESPIRATORY (INHALATION) at 15:12

## 2023-10-22 RX ADMIN — FAMOTIDINE 20 MG: 20 TABLET, FILM COATED ORAL at 20:37

## 2023-10-22 RX ADMIN — ENOXAPARIN SODIUM 40 MG: 100 INJECTION SUBCUTANEOUS at 08:19

## 2023-10-22 RX ADMIN — IPRATROPIUM BROMIDE AND ALBUTEROL SULFATE 1 DOSE: 2.5; .5 SOLUTION RESPIRATORY (INHALATION) at 07:55

## 2023-10-22 RX ADMIN — ACETYLCYSTEINE 600 MG: 200 INHALANT RESPIRATORY (INHALATION) at 20:14

## 2023-10-22 RX ADMIN — ALBUTEROL SULFATE 2.5 MG: 2.5 SOLUTION RESPIRATORY (INHALATION) at 15:12

## 2023-10-22 RX ADMIN — QUETIAPINE FUMARATE 50 MG: 25 TABLET ORAL at 20:37

## 2023-10-22 RX ADMIN — IPRATROPIUM BROMIDE AND ALBUTEROL SULFATE 1 DOSE: 2.5; .5 SOLUTION RESPIRATORY (INHALATION) at 20:14

## 2023-10-22 RX ADMIN — DRONABINOL 2.5 MG: 2.5 CAPSULE ORAL at 08:18

## 2023-10-22 RX ADMIN — Medication 100 MG: at 08:18

## 2023-10-22 RX ADMIN — Medication 6 MG: at 20:37

## 2023-10-22 RX ADMIN — ONDANSETRON 4 MG: 4 TABLET, ORALLY DISINTEGRATING ORAL at 06:02

## 2023-10-22 RX ADMIN — ACETYLCYSTEINE 600 MG: 200 INHALANT RESPIRATORY (INHALATION) at 07:55

## 2023-10-22 RX ADMIN — Medication 1 CAPSULE: at 08:19

## 2023-10-23 ENCOUNTER — APPOINTMENT (OUTPATIENT)
Dept: GENERAL RADIOLOGY | Age: 63
DRG: 329 | End: 2023-10-23
Attending: SURGERY
Payer: COMMERCIAL

## 2023-10-23 LAB
ANION GAP SERPL CALCULATED.3IONS-SCNC: 7 MMOL/L (ref 9–17)
BASOPHILS # BLD: 0 K/UL (ref 0–0.2)
BASOPHILS NFR BLD: 0 % (ref 0–2)
BUN SERPL-MCNC: 16 MG/DL (ref 8–23)
CALCIUM SERPL-MCNC: 7.5 MG/DL (ref 8.6–10.4)
CHLORIDE SERPL-SCNC: 107 MMOL/L (ref 98–107)
CO2 SERPL-SCNC: 22 MMOL/L (ref 20–31)
CREAT SERPL-MCNC: 0.9 MG/DL (ref 0.7–1.2)
EOSINOPHIL # BLD: 0 K/UL (ref 0–0.4)
EOSINOPHILS RELATIVE PERCENT: 0 % (ref 1–4)
ERYTHROCYTE [DISTWIDTH] IN BLOOD BY AUTOMATED COUNT: 13.4 % (ref 11.8–14.4)
GFR SERPL CREATININE-BSD FRML MDRD: >60 ML/MIN/1.73M2
GLUCOSE BLD-MCNC: 113 MG/DL (ref 75–110)
GLUCOSE BLD-MCNC: 130 MG/DL (ref 75–110)
GLUCOSE SERPL-MCNC: 106 MG/DL (ref 70–99)
HCT VFR BLD AUTO: 29.2 % (ref 40.7–50.3)
HGB BLD-MCNC: 9.5 G/DL (ref 13–17)
IMM GRANULOCYTES # BLD AUTO: 0.15 K/UL (ref 0–0.3)
IMM GRANULOCYTES NFR BLD: 1 %
LYMPHOCYTES NFR BLD: 0.15 K/UL (ref 1–4.8)
LYMPHOCYTES RELATIVE PERCENT: 1 % (ref 24–44)
MCH RBC QN AUTO: 33.2 PG (ref 25.2–33.5)
MCHC RBC AUTO-ENTMCNC: 32.5 G/DL (ref 28.4–34.8)
MCV RBC AUTO: 102.1 FL (ref 82.6–102.9)
MONOCYTES NFR BLD: 1.92 K/UL (ref 0.1–0.8)
MONOCYTES NFR BLD: 13 % (ref 1–7)
MORPHOLOGY: NORMAL
NEUTROPHILS NFR BLD: 85 % (ref 36–66)
NEUTS SEG NFR BLD: 12.58 K/UL (ref 1.8–7.7)
NRBC BLD-RTO: 0 PER 100 WBC
PLATELET # BLD AUTO: 221 K/UL (ref 138–453)
PMV BLD AUTO: 10.7 FL (ref 8.1–13.5)
POTASSIUM SERPL-SCNC: 4 MMOL/L (ref 3.7–5.3)
RBC # BLD AUTO: 2.86 M/UL (ref 4.21–5.77)
SODIUM SERPL-SCNC: 136 MMOL/L (ref 135–144)
WBC OTHER # BLD: 14.8 K/UL (ref 3.5–11.3)

## 2023-10-23 PROCEDURE — 94669 MECHANICAL CHEST WALL OSCILL: CPT

## 2023-10-23 PROCEDURE — 6370000000 HC RX 637 (ALT 250 FOR IP): Performed by: STUDENT IN AN ORGANIZED HEALTH CARE EDUCATION/TRAINING PROGRAM

## 2023-10-23 PROCEDURE — 97116 GAIT TRAINING THERAPY: CPT

## 2023-10-23 PROCEDURE — 94640 AIRWAY INHALATION TREATMENT: CPT

## 2023-10-23 PROCEDURE — 97535 SELF CARE MNGMENT TRAINING: CPT

## 2023-10-23 PROCEDURE — 6360000002 HC RX W HCPCS: Performed by: STUDENT IN AN ORGANIZED HEALTH CARE EDUCATION/TRAINING PROGRAM

## 2023-10-23 PROCEDURE — 1200000000 HC SEMI PRIVATE

## 2023-10-23 PROCEDURE — 97110 THERAPEUTIC EXERCISES: CPT

## 2023-10-23 PROCEDURE — 85025 COMPLETE CBC W/AUTO DIFF WBC: CPT

## 2023-10-23 PROCEDURE — 97530 THERAPEUTIC ACTIVITIES: CPT

## 2023-10-23 PROCEDURE — 99232 SBSQ HOSP IP/OBS MODERATE 35: CPT | Performed by: INTERNAL MEDICINE

## 2023-10-23 PROCEDURE — 6370000000 HC RX 637 (ALT 250 FOR IP): Performed by: SURGERY

## 2023-10-23 PROCEDURE — 80048 BASIC METABOLIC PNL TOTAL CA: CPT

## 2023-10-23 PROCEDURE — 2700000000 HC OXYGEN THERAPY PER DAY

## 2023-10-23 PROCEDURE — 6370000000 HC RX 637 (ALT 250 FOR IP): Performed by: INTERNAL MEDICINE

## 2023-10-23 PROCEDURE — 36415 COLL VENOUS BLD VENIPUNCTURE: CPT

## 2023-10-23 PROCEDURE — 94761 N-INVAS EAR/PLS OXIMETRY MLT: CPT

## 2023-10-23 PROCEDURE — 92526 ORAL FUNCTION THERAPY: CPT

## 2023-10-23 PROCEDURE — 82947 ASSAY GLUCOSE BLOOD QUANT: CPT

## 2023-10-23 PROCEDURE — 6370000000 HC RX 637 (ALT 250 FOR IP)

## 2023-10-23 PROCEDURE — 71045 X-RAY EXAM CHEST 1 VIEW: CPT

## 2023-10-23 RX ADMIN — ACETYLCYSTEINE 600 MG: 200 INHALANT RESPIRATORY (INHALATION) at 22:40

## 2023-10-23 RX ADMIN — QUETIAPINE FUMARATE 50 MG: 25 TABLET ORAL at 20:39

## 2023-10-23 RX ADMIN — ACETYLCYSTEINE 600 MG: 200 INHALANT RESPIRATORY (INHALATION) at 07:59

## 2023-10-23 RX ADMIN — FAMOTIDINE 20 MG: 20 TABLET, FILM COATED ORAL at 20:39

## 2023-10-23 RX ADMIN — IPRATROPIUM BROMIDE AND ALBUTEROL SULFATE 1 DOSE: 2.5; .5 SOLUTION RESPIRATORY (INHALATION) at 22:40

## 2023-10-23 RX ADMIN — ONDANSETRON 4 MG: 4 TABLET, ORALLY DISINTEGRATING ORAL at 17:44

## 2023-10-23 RX ADMIN — NADOLOL 20 MG: 20 TABLET ORAL at 08:30

## 2023-10-23 RX ADMIN — DRONABINOL 2.5 MG: 2.5 CAPSULE ORAL at 08:30

## 2023-10-23 RX ADMIN — DRONABINOL 2.5 MG: 2.5 CAPSULE ORAL at 20:39

## 2023-10-23 RX ADMIN — IPRATROPIUM BROMIDE AND ALBUTEROL SULFATE 1 DOSE: 2.5; .5 SOLUTION RESPIRATORY (INHALATION) at 07:59

## 2023-10-23 RX ADMIN — ALCOHOL 1 TABLET: 70.47 GEL TOPICAL at 08:30

## 2023-10-23 RX ADMIN — ENOXAPARIN SODIUM 40 MG: 100 INJECTION SUBCUTANEOUS at 08:30

## 2023-10-23 RX ADMIN — SPIRONOLACTONE 25 MG: 25 TABLET ORAL at 08:30

## 2023-10-23 RX ADMIN — FAMOTIDINE 20 MG: 20 TABLET, FILM COATED ORAL at 08:30

## 2023-10-23 RX ADMIN — Medication 6 MG: at 20:39

## 2023-10-23 RX ADMIN — Medication 1 CAPSULE: at 08:30

## 2023-10-23 RX ADMIN — Medication 100 MG: at 08:30

## 2023-10-23 RX ADMIN — FUROSEMIDE 20 MG: 20 TABLET ORAL at 08:30

## 2023-10-23 ASSESSMENT — ENCOUNTER SYMPTOMS
NAUSEA: 1
VOMITING: 0
DIARRHEA: 1

## 2023-10-23 NOTE — CARE COORDINATION
Transitional Planning:  Call to Erik Dobbs [phone] at [facility]  she can begin precert once updated OT/PT notes are in. Both departments notified for need for therapy treatment.

## 2023-10-24 PROCEDURE — 6370000000 HC RX 637 (ALT 250 FOR IP): Performed by: SURGERY

## 2023-10-24 PROCEDURE — 6370000000 HC RX 637 (ALT 250 FOR IP): Performed by: STUDENT IN AN ORGANIZED HEALTH CARE EDUCATION/TRAINING PROGRAM

## 2023-10-24 PROCEDURE — 99232 SBSQ HOSP IP/OBS MODERATE 35: CPT | Performed by: INTERNAL MEDICINE

## 2023-10-24 PROCEDURE — 6370000000 HC RX 637 (ALT 250 FOR IP)

## 2023-10-24 PROCEDURE — 94640 AIRWAY INHALATION TREATMENT: CPT

## 2023-10-24 PROCEDURE — 94761 N-INVAS EAR/PLS OXIMETRY MLT: CPT

## 2023-10-24 PROCEDURE — 1200000000 HC SEMI PRIVATE

## 2023-10-24 PROCEDURE — 2700000000 HC OXYGEN THERAPY PER DAY

## 2023-10-24 PROCEDURE — 6360000002 HC RX W HCPCS: Performed by: STUDENT IN AN ORGANIZED HEALTH CARE EDUCATION/TRAINING PROGRAM

## 2023-10-24 PROCEDURE — 6370000000 HC RX 637 (ALT 250 FOR IP): Performed by: INTERNAL MEDICINE

## 2023-10-24 PROCEDURE — 94669 MECHANICAL CHEST WALL OSCILL: CPT

## 2023-10-24 RX ORDER — CALCIUM CARBONATE 500 MG/1
750 TABLET, CHEWABLE ORAL 3 TIMES DAILY PRN
Status: DISCONTINUED | OUTPATIENT
Start: 2023-10-24 | End: 2023-10-25 | Stop reason: HOSPADM

## 2023-10-24 RX ORDER — LACTOBACILLUS RHAMNOSUS GG 10B CELL
1 CAPSULE ORAL DAILY
DISCHARGE
Start: 2023-10-25 | End: 2023-11-24

## 2023-10-24 RX ORDER — DRONABINOL 2.5 MG/1
2.5 CAPSULE ORAL 2 TIMES DAILY
Qty: 60 CAPSULE | Refills: 0 | Status: SHIPPED | OUTPATIENT
Start: 2023-10-24 | End: 2023-11-23

## 2023-10-24 RX ORDER — ACETYLCYSTEINE 200 MG/ML
600 SOLUTION ORAL; RESPIRATORY (INHALATION) 2 TIMES DAILY
Status: DISCONTINUED | OUTPATIENT
Start: 2023-10-24 | End: 2023-10-25 | Stop reason: HOSPADM

## 2023-10-24 RX ADMIN — ANTACID TABLETS 750 MG: 500 TABLET, CHEWABLE ORAL at 15:36

## 2023-10-24 RX ADMIN — ENOXAPARIN SODIUM 40 MG: 100 INJECTION SUBCUTANEOUS at 09:34

## 2023-10-24 RX ADMIN — QUETIAPINE FUMARATE 50 MG: 25 TABLET ORAL at 20:50

## 2023-10-24 RX ADMIN — ONDANSETRON 4 MG: 4 TABLET, ORALLY DISINTEGRATING ORAL at 09:39

## 2023-10-24 RX ADMIN — FAMOTIDINE 20 MG: 20 TABLET, FILM COATED ORAL at 09:35

## 2023-10-24 RX ADMIN — FAMOTIDINE 20 MG: 20 TABLET, FILM COATED ORAL at 20:50

## 2023-10-24 RX ADMIN — DRONABINOL 2.5 MG: 2.5 CAPSULE ORAL at 20:50

## 2023-10-24 RX ADMIN — Medication 6 MG: at 20:50

## 2023-10-24 RX ADMIN — IPRATROPIUM BROMIDE AND ALBUTEROL SULFATE 1 DOSE: 2.5; .5 SOLUTION RESPIRATORY (INHALATION) at 08:03

## 2023-10-24 RX ADMIN — NADOLOL 20 MG: 20 TABLET ORAL at 09:35

## 2023-10-24 RX ADMIN — ALCOHOL 1 TABLET: 70.47 GEL TOPICAL at 09:35

## 2023-10-24 RX ADMIN — SPIRONOLACTONE 25 MG: 25 TABLET ORAL at 09:35

## 2023-10-24 RX ADMIN — ACETYLCYSTEINE 600 MG: 200 INHALANT RESPIRATORY (INHALATION) at 08:03

## 2023-10-24 RX ADMIN — Medication 1 CAPSULE: at 09:36

## 2023-10-24 RX ADMIN — IPRATROPIUM BROMIDE AND ALBUTEROL SULFATE 1 DOSE: 2.5; .5 SOLUTION RESPIRATORY (INHALATION) at 21:06

## 2023-10-24 RX ADMIN — Medication 100 MG: at 09:36

## 2023-10-24 RX ADMIN — FUROSEMIDE 20 MG: 20 TABLET ORAL at 09:35

## 2023-10-24 RX ADMIN — DRONABINOL 2.5 MG: 2.5 CAPSULE ORAL at 09:34

## 2023-10-24 RX ADMIN — ACETYLCYSTEINE 600 MG: 200 INHALANT RESPIRATORY (INHALATION) at 21:09

## 2023-10-24 ASSESSMENT — ENCOUNTER SYMPTOMS
EYE DISCHARGE: 0
ABDOMINAL PAIN: 1
APNEA: 0
DIARRHEA: 0
VOMITING: 0
NAUSEA: 0

## 2023-10-24 ASSESSMENT — PAIN SCALES - GENERAL: PAINLEVEL_OUTOF10: 0

## 2023-10-24 NOTE — DISCHARGE INSTRUCTIONS
Patient Discharge Instructions  Discharge Date:  10/24/2023    HYGEINE: Luis Dinning to shower, no soaking in a tub/pool until seen at your follow up appointment. Clean incision daily with gentle soap and water, do not use alcohol/peroxide or any harsh cleansers. ACTIVITY: No lifting greater than 15lbs for 3 weeks or any strenuous activity until you are cleared by Dr. Maye Maravilla. DIET: Resume your normal diet as tolerated. MEDICATIONS: Take all medications as prescribed. SPECIAL INSTRUCTIONS:     Follow-up   Follow up with your primary care physician in one week. Follow up with Dr. Maye Maravilla in 1 week. If you don't already have a scheduled  appointment, please call the office. Call Your Doctor If Any of the Following Occurs   Monitor your recovery once you leave the hospital. If any of the following occur, call your doctor:   Signs of infection, including fever above 100.5F    Redness, swelling, increasing pain, excessive bleeding, or any discharge from the incision site   Persistent nausea and/or vomiting   Pain that you can't control with the medications you've been given   Shortness of breath and/or chest pain   Tachycardia (racing heart sensation) unrelieved by rest  Pain, redness and/or swelling in your feet or legs    Sudden onset of severe left shoulder pain or severe abdominal pain  Any symptoms that are causing you concern   In case of an emergency, call 911 immediately.

## 2023-10-25 VITALS
HEIGHT: 70 IN | SYSTOLIC BLOOD PRESSURE: 122 MMHG | WEIGHT: 172.84 LBS | BODY MASS INDEX: 24.74 KG/M2 | TEMPERATURE: 97.8 F | OXYGEN SATURATION: 95 % | RESPIRATION RATE: 18 BRPM | HEART RATE: 76 BPM | DIASTOLIC BLOOD PRESSURE: 57 MMHG

## 2023-10-25 PROCEDURE — 94640 AIRWAY INHALATION TREATMENT: CPT

## 2023-10-25 PROCEDURE — 6360000002 HC RX W HCPCS: Performed by: STUDENT IN AN ORGANIZED HEALTH CARE EDUCATION/TRAINING PROGRAM

## 2023-10-25 PROCEDURE — 6370000000 HC RX 637 (ALT 250 FOR IP): Performed by: INTERNAL MEDICINE

## 2023-10-25 PROCEDURE — 2700000000 HC OXYGEN THERAPY PER DAY

## 2023-10-25 PROCEDURE — 6370000000 HC RX 637 (ALT 250 FOR IP): Performed by: SURGERY

## 2023-10-25 PROCEDURE — 6370000000 HC RX 637 (ALT 250 FOR IP): Performed by: STUDENT IN AN ORGANIZED HEALTH CARE EDUCATION/TRAINING PROGRAM

## 2023-10-25 PROCEDURE — 94760 N-INVAS EAR/PLS OXIMETRY 1: CPT

## 2023-10-25 PROCEDURE — 6370000000 HC RX 637 (ALT 250 FOR IP)

## 2023-10-25 PROCEDURE — 51798 US URINE CAPACITY MEASURE: CPT

## 2023-10-25 RX ORDER — ACETAMINOPHEN 325 MG/1
650 TABLET ORAL EVERY 6 HOURS PRN
Status: DISCONTINUED | OUTPATIENT
Start: 2023-10-25 | End: 2023-10-25 | Stop reason: HOSPADM

## 2023-10-25 RX ORDER — METOCLOPRAMIDE 10 MG/1
10 TABLET ORAL NIGHTLY
Status: DISCONTINUED | OUTPATIENT
Start: 2023-10-25 | End: 2023-10-25 | Stop reason: HOSPADM

## 2023-10-25 RX ADMIN — DRONABINOL 2.5 MG: 2.5 CAPSULE ORAL at 07:48

## 2023-10-25 RX ADMIN — NADOLOL 20 MG: 20 TABLET ORAL at 07:48

## 2023-10-25 RX ADMIN — SPIRONOLACTONE 25 MG: 25 TABLET ORAL at 07:49

## 2023-10-25 RX ADMIN — ALCOHOL 1 TABLET: 70.47 GEL TOPICAL at 07:49

## 2023-10-25 RX ADMIN — FUROSEMIDE 20 MG: 20 TABLET ORAL at 07:49

## 2023-10-25 RX ADMIN — Medication 1 CAPSULE: at 07:49

## 2023-10-25 RX ADMIN — IPRATROPIUM BROMIDE AND ALBUTEROL SULFATE 1 DOSE: 2.5; .5 SOLUTION RESPIRATORY (INHALATION) at 09:21

## 2023-10-25 RX ADMIN — ACETYLCYSTEINE 600 MG: 200 INHALANT RESPIRATORY (INHALATION) at 09:21

## 2023-10-25 RX ADMIN — Medication 100 MG: at 07:49

## 2023-10-25 RX ADMIN — ENOXAPARIN SODIUM 40 MG: 100 INJECTION SUBCUTANEOUS at 07:50

## 2023-10-25 RX ADMIN — FAMOTIDINE 20 MG: 20 TABLET, FILM COATED ORAL at 07:49

## 2023-10-25 RX ADMIN — ACETAMINOPHEN 650 MG: 325 TABLET ORAL at 09:21

## 2023-10-25 ASSESSMENT — PAIN DESCRIPTION - ORIENTATION: ORIENTATION: MID

## 2023-10-25 ASSESSMENT — PAIN SCALES - GENERAL
PAINLEVEL_OUTOF10: 8
PAINLEVEL_OUTOF10: 8

## 2023-10-25 ASSESSMENT — PAIN DESCRIPTION - DESCRIPTORS: DESCRIPTORS: ACHING

## 2023-10-25 ASSESSMENT — PAIN DESCRIPTION - PAIN TYPE: TYPE: ACUTE PAIN;SURGICAL PAIN

## 2023-10-25 ASSESSMENT — PAIN DESCRIPTION - LOCATION: LOCATION: ABDOMEN;INCISION

## 2023-10-25 NOTE — PLAN OF CARE
BRONCHOSPASM/BRONCHOCONSTRICTION     [x]         IMPROVE AERATION/BREATH SOUNDS  [x]   ADMINISTER BRONCHODILATOR THERAPY AS APPROPRIATE  [x]   ASSESS BREATH SOUNDS  []   IMPLEMENT AEROSOL/MDI PROTOCOL  [x]   PATIENT EDUCATION AS NEEDED       Problem: Respiratory - Adult  Goal: Achieves optimal ventilation and oxygenation  10/19/2023 1013 by Deep Delgadillo RCP  Outcome: Progressing  Flowsheets (Taken 10/19/2023 1013)  Achieves optimal ventilation and oxygenation:   Respiratory therapy support as indicated   Assess for changes in mentation and behavior   Oxygen supplementation based on oxygen saturation or arterial blood gases   Encourage broncho-pulmonary hygiene including cough, deep breathe, incentive spirometry   Assess and instruct to report shortness of breath or any respiratory difficulty   Assess for changes in respiratory status  10/19/2023 3105 by Fili Ross RN  Outcome: Progressing
Problem: Discharge Planning  Goal: Discharge to home or other facility with appropriate resources  10/11/2023 1809 by Stephany Angel RN  Outcome: Progressing  10/11/2023 0429 by Stephenie Hatchet, RN  Outcome: Progressing     Problem: Pain  Goal: Verbalizes/displays adequate comfort level or baseline comfort level  10/11/2023 1809 by Stephany Angel RN  Outcome: Progressing  10/11/2023 0429 by Stephenie Hatchet, RN  Outcome: Progressing     Problem: Safety - Adult  Goal: Free from fall injury  10/11/2023 1809 by Stephany Angel RN  Outcome: Progressing  10/11/2023 0429 by Stephenie Hatchet, RN  Outcome: Progressing
Problem: Discharge Planning  Goal: Discharge to home or other facility with appropriate resources  10/17/2023 1823 by Fletcher Christian RN  Outcome: Progressing  60/21/6272 5926 by Jayden Loyola RN  Outcome: Progressing     Problem: Pain  Goal: Verbalizes/displays adequate comfort level or baseline comfort level  10/17/2023 1823 by Fletcher Christian RN  Outcome: Progressing  82/82/6245 8965 by Jayden Loyola RN  Outcome: Progressing     Problem: Safety - Adult  Goal: Free from fall injury  10/17/2023 1823 by Fletcher Christian RN  Outcome: Progressing  Flowsheets (Taken 10/17/2023 0800)  Free From Fall Injury: Instruct family/caregiver on patient safety  95/18/1515 5347 by Jayden Loyola RN  Outcome: Progressing     Problem: ABCDS Injury Assessment  Goal: Absence of physical injury  10/17/2023 1823 by Fletcher Christian RN  Outcome: Progressing  Flowsheets (Taken 10/17/2023 0800)  Absence of Physical Injury: Implement safety measures based on patient assessment  63/45/9539 4135 by Jayden Loyola RN  Outcome: Progressing     Problem: Respiratory - Adult  Goal: Achieves optimal ventilation and oxygenation  10/17/2023 1823 by Fletcher Christian RN  Outcome: Progressing  Flowsheets (Taken 10/17/2023 0815)  Achieves optimal ventilation and oxygenation: Assess for changes in respiratory status  52/08/3467 7892 by Jayden Loyola RN  Outcome: Progressing     Problem: Skin/Tissue Integrity  Goal: Absence of new skin breakdown  Description: 1. Monitor for areas of redness and/or skin breakdown  2. Assess vascular access sites hourly  3. Every 4-6 hours minimum:  Change oxygen saturation probe site  4. Every 4-6 hours:  If on nasal continuous positive airway pressure, respiratory therapy assess nares and determine need for appliance change or resting period.   10/17/2023 1823 by Fletcher Christian RN  Outcome: Progressing  60/26/3647 6810 by Jayden Loyola RN  Outcome: Progressing     Problem:
Problem: Discharge Planning  Goal: Discharge to home or other facility with appropriate resources  10/18/2023 0306 by Jenni Christine RN  Outcome: Progressing  10/17/2023 1823 by Aliza Ann RN  Outcome: Progressing     Problem: Safety - Adult  Goal: Free from fall injury  10/18/2023 0306 by Jenni Christine RN  Outcome: Progressing  10/17/2023 1823 by Aliza Ann RN  Outcome: Progressing  Flowsheets (Taken 10/17/2023 0800)  Free From Fall Injury: Instruct family/caregiver on patient safety     Problem: ABCDS Injury Assessment  Goal: Absence of physical injury  10/18/2023 0306 by Jenni Christine RN  Outcome: Progressing  Flowsheets (Taken 10/17/2023 2000)  Absence of Physical Injury: Implement safety measures based on patient assessment  10/17/2023 1823 by Aliza Ann RN  Outcome: Progressing  Flowsheets (Taken 10/17/2023 0800)  Absence of Physical Injury: Implement safety measures based on patient assessment     Problem: Skin/Tissue Integrity  Goal: Absence of new skin breakdown  Description: 1. Monitor for areas of redness and/or skin breakdown  2. Assess vascular access sites hourly  3. Every 4-6 hours minimum:  Change oxygen saturation probe site  4. Every 4-6 hours:  If on nasal continuous positive airway pressure, respiratory therapy assess nares and determine need for appliance change or resting period.   10/18/2023 0306 by Jenni Christine RN  Outcome: Progressing  10/17/2023 1823 by Aliza Ann RN  Outcome: Progressing
Problem: Discharge Planning  Goal: Discharge to home or other facility with appropriate resources  10/19/2023 0415 by Turner Linder RN  Outcome: Progressing  10/18/2023 1815 by Lina Olmedo RN  Outcome: Progressing     Problem: Pain  Goal: Verbalizes/displays adequate comfort level or baseline comfort level  10/19/2023 0415 by Turner Linder RN  Outcome: Progressing  10/18/2023 1815 by Lina Olmedo RN  Outcome: Progressing     Problem: Safety - Adult  Goal: Free from fall injury  10/19/2023 0415 by Turner Linder RN  Outcome: Progressing  10/18/2023 1815 by Lina Olmdeo RN  Outcome: Progressing  Flowsheets (Taken 10/18/2023 0700)  Free From Fall Injury: Instruct family/caregiver on patient safety     Problem: ABCDS Injury Assessment  Goal: Absence of physical injury  10/19/2023 0415 by Turner Linder RN  Outcome: Progressing  10/18/2023 1815 by Lina Olmedo RN  Outcome: Progressing  Flowsheets (Taken 10/18/2023 0700)  Absence of Physical Injury: Implement safety measures based on patient assessment     Problem: Respiratory - Adult  Goal: Achieves optimal ventilation and oxygenation  10/19/2023 0415 by Turner Linder RN  Outcome: Progressing  10/18/2023 1815 by Lina Olmedo RN  Outcome: Progressing     Problem: Skin/Tissue Integrity  Goal: Absence of new skin breakdown  Description: 1. Monitor for areas of redness and/or skin breakdown  2. Assess vascular access sites hourly  3. Every 4-6 hours minimum:  Change oxygen saturation probe site  4. Every 4-6 hours:  If on nasal continuous positive airway pressure, respiratory therapy assess nares and determine need for appliance change or resting period.   10/19/2023 0415 by Turner Linder RN  Outcome: Progressing  10/18/2023 1815 by Lina Olmedo RN  Outcome: Progressing     Problem: Nutrition Deficit:  Goal: Optimize nutritional status  10/19/2023 0415 by Turner Linder RN  Outcome: Progressing  10/18/2023 1815 by Lina Olmedo RN  Outcome:
Problem: Discharge Planning  Goal: Discharge to home or other facility with appropriate resources  10/21/2023 0402 by Drew Gavin RN  Outcome: Progressing  10/20/2023 1840 by Jennifer Young RN  Outcome: Progressing     Problem: Pain  Goal: Verbalizes/displays adequate comfort level or baseline comfort level  10/21/2023 0402 by Drew Gavin RN  Outcome: Progressing  10/20/2023 1840 by Jennifer Young RN  Outcome: Progressing     Problem: Safety - Adult  Goal: Free from fall injury  10/21/2023 0402 by Drew Gavin RN  Outcome: Progressing  10/20/2023 1840 by Jennifer Yonug RN  Outcome: Progressing     Problem: ABCDS Injury Assessment  Goal: Absence of physical injury  10/21/2023 0402 by Drew Gavin RN  Outcome: Progressing  Flowsheets (Taken 10/20/2023 1938)  Absence of Physical Injury: Implement safety measures based on patient assessment  10/20/2023 1840 by Jennifer Young RN  Outcome: Progressing     Problem: Respiratory - Adult  Goal: Achieves optimal ventilation and oxygenation  10/21/2023 0402 by Drew Gavin RN  Outcome: Progressing  10/20/2023 1957 by Caryle Flower, RCP  Outcome: Progressing  10/20/2023 1840 by Jennifer Young RN  Outcome: Progressing     Problem: Skin/Tissue Integrity  Goal: Absence of new skin breakdown  Description: 1. Monitor for areas of redness and/or skin breakdown  2. Assess vascular access sites hourly  3. Every 4-6 hours minimum:  Change oxygen saturation probe site  4. Every 4-6 hours:  If on nasal continuous positive airway pressure, respiratory therapy assess nares and determine need for appliance change or resting period.   10/21/2023 0402 by Drew Gavin RN  Outcome: Progressing  10/20/2023 1840 by Jennifer Young RN  Outcome: Progressing     Problem: Nutrition Deficit:  Goal: Optimize nutritional status  10/20/2023 1840 by Jennifer Young RN  Outcome: Progressing
Problem: Discharge Planning  Goal: Discharge to home or other facility with appropriate resources  10/21/2023 1610 by Wanda Gill RN  Outcome: Progressing  10/21/2023 0402 by Lj Seymour RN  Outcome: Progressing     Problem: Pain  Goal: Verbalizes/displays adequate comfort level or baseline comfort level  10/21/2023 1610 by Wanda Gill RN  Outcome: Progressing  10/21/2023 0402 by Lj Seymour RN  Outcome: Progressing     Problem: Safety - Adult  Goal: Free from fall injury  10/21/2023 1610 by Wanda Gill RN  Outcome: Progressing  10/21/2023 0402 by Lj Seymour RN  Outcome: Progressing     Problem: ABCDS Injury Assessment  Goal: Absence of physical injury  10/21/2023 1610 by Wanda Gill RN  Outcome: Progressing  10/21/2023 0402 by Lj Seymour RN  Outcome: Progressing  Flowsheets (Taken 10/20/2023 1938)  Absence of Physical Injury: Implement safety measures based on patient assessment     Problem: Respiratory - Adult  Goal: Achieves optimal ventilation and oxygenation  10/21/2023 1610 by Wanda Gill RN  Outcome: Progressing  10/21/2023 0829 by Andrew Choudhury RCP  Outcome: Progressing  10/21/2023 0402 by Lj Seymour RN  Outcome: Progressing     Problem: Skin/Tissue Integrity  Goal: Absence of new skin breakdown  Description: 1. Monitor for areas of redness and/or skin breakdown  2. Assess vascular access sites hourly  3. Every 4-6 hours minimum:  Change oxygen saturation probe site  4. Every 4-6 hours:  If on nasal continuous positive airway pressure, respiratory therapy assess nares and determine need for appliance change or resting period.   10/21/2023 1610 by Wanda Gill RN  Outcome: Progressing  10/21/2023 0402 by Lj Seymour RN  Outcome: Progressing     Problem: Nutrition Deficit:  Goal: Optimize nutritional status  Outcome: Progressing
Problem: Discharge Planning  Goal: Discharge to home or other facility with appropriate resources  10/22/2023 0310 by Giuseppe Albarran RN  Outcome: Progressing  10/21/2023 1610 by Irwin Ayala RN  Outcome: Progressing     Problem: Safety - Adult  Goal: Free from fall injury  10/22/2023 0310 by Giuseppe Albarran RN  Outcome: Progressing  10/21/2023 1610 by Irwin Ayala RN  Outcome: Progressing     Problem: ABCDS Injury Assessment  Goal: Absence of physical injury  10/22/2023 0310 by Giuseppe Albarran RN  Outcome: Progressing  Flowsheets (Taken 10/21/2023 2000)  Absence of Physical Injury: Implement safety measures based on patient assessment  10/21/2023 1610 by Irwin Ayala RN  Outcome: Progressing     Problem: Skin/Tissue Integrity  Goal: Absence of new skin breakdown  Description: 1. Monitor for areas of redness and/or skin breakdown  2. Assess vascular access sites hourly  3. Every 4-6 hours minimum:  Change oxygen saturation probe site  4. Every 4-6 hours:  If on nasal continuous positive airway pressure, respiratory therapy assess nares and determine need for appliance change or resting period.   10/22/2023 0310 by Giuseppe Albarran RN  Outcome: Progressing  10/21/2023 1610 by Irwin Ayala RN  Outcome: Progressing
Problem: Discharge Planning  Goal: Discharge to home or other facility with appropriate resources  10/22/2023 1513 by Richie Castellanos RN  Outcome: Progressing  10/22/2023 0310 by Rosanne Field RN  Outcome: Progressing     Problem: Pain  Goal: Verbalizes/displays adequate comfort level or baseline comfort level  10/22/2023 1513 by Richie Castellanos RN  Outcome: Progressing  10/22/2023 0310 by Rosanne Field RN  Outcome: Progressing     Problem: Safety - Adult  Goal: Free from fall injury  10/22/2023 1513 by Richie Castellanos RN  Outcome: Progressing  10/22/2023 0310 by Rosanne Field RN  Outcome: Progressing     Problem: ABCDS Injury Assessment  Goal: Absence of physical injury  10/22/2023 1513 by Richie Castellanos RN  Outcome: Progressing  10/22/2023 0310 by Rosanne Field RN  Outcome: Progressing  Flowsheets (Taken 10/21/2023 2000)  Absence of Physical Injury: Implement safety measures based on patient assessment     Problem: Respiratory - Adult  Goal: Achieves optimal ventilation and oxygenation  10/22/2023 1513 by Richie Castellanos RN  Outcome: Progressing  10/22/2023 0310 by Rosanne Field RN  Outcome: Progressing     Problem: Skin/Tissue Integrity  Goal: Absence of new skin breakdown  Description: 1. Monitor for areas of redness and/or skin breakdown  2. Assess vascular access sites hourly  3. Every 4-6 hours minimum:  Change oxygen saturation probe site  4. Every 4-6 hours:  If on nasal continuous positive airway pressure, respiratory therapy assess nares and determine need for appliance change or resting period.   10/22/2023 1513 by Richie Castellanos RN  Outcome: Progressing  10/22/2023 0310 by Rosanne Field RN  Outcome: Progressing     Problem: Nutrition Deficit:  Goal: Optimize nutritional status  10/22/2023 1513 by Richie Castellanos RN  Outcome: Progressing  10/22/2023 0310 by Rosanne Field RN  Outcome: Progressing
Problem: Discharge Planning  Goal: Discharge to home or other facility with appropriate resources  10/23/2023 0303 by Julia Lefort, RN  Outcome: Progressing  10/22/2023 1513 by Yancy Vargas RN  Outcome: Progressing     Problem: Safety - Adult  Goal: Free from fall injury  10/23/2023 0303 by Julia Lefort, RN  Outcome: Progressing  10/22/2023 1513 by Yancy Vargas RN  Outcome: Progressing     Problem: ABCDS Injury Assessment  Goal: Absence of physical injury  10/23/2023 0303 by Julia Lefort, RN  Outcome: Progressing  Flowsheets (Taken 10/22/2023 2106)  Absence of Physical Injury: Implement safety measures based on patient assessment  10/22/2023 1513 by Yancy Vargas RN  Outcome: Progressing     Problem: Skin/Tissue Integrity  Goal: Absence of new skin breakdown  Description: 1. Monitor for areas of redness and/or skin breakdown  2. Assess vascular access sites hourly  3. Every 4-6 hours minimum:  Change oxygen saturation probe site  4. Every 4-6 hours:  If on nasal continuous positive airway pressure, respiratory therapy assess nares and determine need for appliance change or resting period.   10/23/2023 0303 by Julia Lefort, RN  Outcome: Progressing  10/22/2023 1513 by Yancy Vargas RN  Outcome: Progressing
Problem: Discharge Planning  Goal: Discharge to home or other facility with appropriate resources  10/24/2023 1852 by Kelsie Anne RN  Outcome: Progressing  80/74/4055 3125 by Jayden Loyola RN  Outcome: Progressing     Problem: Pain  Goal: Verbalizes/displays adequate comfort level or baseline comfort level  10/24/2023 1852 by Kelsie Anne RN  Outcome: Progressing  49/72/9604 3580 by Jayden Loyola RN  Outcome: Progressing     Problem: Safety - Adult  Goal: Free from fall injury  10/24/2023 1852 by Kelsie Anne RN  Outcome: Progressing  84/16/0465 3841 by Jayden Loyola RN  Outcome: Progressing     Problem: ABCDS Injury Assessment  Goal: Absence of physical injury  10/24/2023 1852 by Kelsei Anne RN  Outcome: Progressing  42/69/4360 7857 by Jayden Loyola RN  Outcome: Progressing     Problem: Respiratory - Adult  Goal: Achieves optimal ventilation and oxygenation  10/24/2023 1852 by Kelsie Anne RN  Outcome: Progressing  10/24/2023 0836 by Janet Yarbrough RCP  Flowsheets (Taken 10/24/2023 5903)  Achieves optimal ventilation and oxygenation: Respiratory therapy support as indicated  70/46/1274 0242 by Jayden Loyola RN  Outcome: Progressing     Problem: Skin/Tissue Integrity  Goal: Absence of new skin breakdown  Description: 1. Monitor for areas of redness and/or skin breakdown  2. Assess vascular access sites hourly  3. Every 4-6 hours minimum:  Change oxygen saturation probe site  4. Every 4-6 hours:  If on nasal continuous positive airway pressure, respiratory therapy assess nares and determine need for appliance change or resting period.   10/24/2023 1852 by Kelsie Anne RN  Outcome: Progressing  65/96/3232 5606 by Jayden Loyola RN  Outcome: Progressing     Problem: Nutrition Deficit:  Goal: Optimize nutritional status  10/24/2023 1852 by Kelsie Anne RN  Outcome: Progressing  16/33/7277 6913 by Jayden Loyola RN  Outcome: Progressing
Problem: Discharge Planning  Goal: Discharge to home or other facility with appropriate resources  10/25/2023 0300 by Jade Balderas RN  Outcome: Progressing  10/24/2023 1852 by Christi Martin RN  Outcome: Progressing     Problem: Pain  Goal: Verbalizes/displays adequate comfort level or baseline comfort level  10/25/2023 0300 by Jade Balderas RN  Outcome: Progressing  10/24/2023 1852 by Christi Martin RN  Outcome: Progressing     Problem: Safety - Adult  Goal: Free from fall injury  10/25/2023 0300 by Jade Balderas RN  Outcome: Progressing  10/24/2023 1852 by Christi Martin RN  Outcome: Progressing     Problem: ABCDS Injury Assessment  Goal: Absence of physical injury  10/25/2023 0300 by Jade Balderas RN  Outcome: Progressing  Flowsheets (Taken 10/24/2023 2000)  Absence of Physical Injury: Implement safety measures based on patient assessment  10/24/2023 1852 by Christi Martin RN  Outcome: Progressing     Problem: Skin/Tissue Integrity  Goal: Absence of new skin breakdown  Description: 1. Monitor for areas of redness and/or skin breakdown  2. Assess vascular access sites hourly  3. Every 4-6 hours minimum:  Change oxygen saturation probe site  4. Every 4-6 hours:  If on nasal continuous positive airway pressure, respiratory therapy assess nares and determine need for appliance change or resting period.   10/25/2023 0300 by Jade Balderas RN  Outcome: Progressing  10/24/2023 1852 by Christi Martin RN  Outcome: Progressing
Problem: Discharge Planning  Goal: Discharge to home or other facility with appropriate resources  10/25/2023 1635 by Titi Mack RN  Outcome: Completed  10/25/2023 0952 by Titi Mack RN  Outcome: Progressing  Flowsheets (Taken 10/25/2023 3910)  Discharge to home or other facility with appropriate resources: Identify barriers to discharge with patient and caregiver  10/25/2023 0300 by Zoe Al RN  Outcome: Progressing     Problem: Pain  Goal: Verbalizes/displays adequate comfort level or baseline comfort level  10/25/2023 1635 by Titi Mack RN  Outcome: Completed  10/25/2023 0952 by Titi Mack RN  Outcome: Progressing  Flowsheets (Taken 10/25/2023 5622)  Verbalizes/displays adequate comfort level or baseline comfort level: Administer analgesics based on type and severity of pain and evaluate response  10/25/2023 0300 by Zoe Al RN  Outcome: Progressing     Problem: Safety - Adult  Goal: Free from fall injury  10/25/2023 1635 by Titi Mack RN  Outcome: Completed  10/25/2023 0952 by Titi Mack RN  Outcome: Progressing  Flowsheets (Taken 10/25/2023 4267)  Free From Fall Injury: Instruct family/caregiver on patient safety  10/25/2023 0300 by Zoe Al RN  Outcome: Progressing     Problem: ABCDS Injury Assessment  Goal: Absence of physical injury  10/25/2023 1635 by Titi Mack RN  Outcome: Completed  10/25/2023 0952 by Titi Mack RN  Outcome: Progressing  Flowsheets (Taken 10/24/2023 2000 by Zoe Al RN)  Absence of Physical Injury: Implement safety measures based on patient assessment  10/25/2023 0300 by Zoe Al RN  Outcome: Progressing  Flowsheets (Taken 10/24/2023 2000)  Absence of Physical Injury: Implement safety measures based on patient assessment     Problem: Respiratory - Adult  Goal: Achieves optimal ventilation and oxygenation  10/25/2023 1635 by Titi Mack RN  Outcome: Completed  10/25/2023 0952 by
Problem: Discharge Planning  Goal: Discharge to home or other facility with appropriate resources  31/23/9760 9180 by Mark Chaudhary RN  Outcome: Progressing  10/14/2023 1936 by Jeanne Bai RN  Outcome: Progressing     Problem: Pain  Goal: Verbalizes/displays adequate comfort level or baseline comfort level  68/19/9726 9718 by Mark Chaudhary RN  Outcome: Progressing  10/14/2023 1936 by Jeanne Bai RN  Outcome: Progressing     Problem: Safety - Adult  Goal: Free from fall injury  17/85/9553 2004 by Mark Chaudhary RN  Outcome: Progressing  10/14/2023 1936 by Jeanne Bai RN  Outcome: Progressing     Problem: ABCDS Injury Assessment  Goal: Absence of physical injury  06/16/8787 2833 by Mark Chaudhary RN  Outcome: Progressing  10/14/2023 1936 by Jeanne Bai RN  Outcome: Progressing
Problem: Discharge Planning  Goal: Discharge to home or other facility with appropriate resources  59/52/7858 8707 by Sreena Zamorano RN  Outcome: Progressing  10/16/2023 1906 by Isabelle Ellis RN  Outcome: Progressing     Problem: Pain  Goal: Verbalizes/displays adequate comfort level or baseline comfort level  44/55/8281 2417 by Serena Zamorano RN  Outcome: Progressing  10/16/2023 1906 by Isabelle Ellis RN  Outcome: Progressing     Problem: Safety - Adult  Goal: Free from fall injury  50/96/4957 7341 by Serena Zamorano RN  Outcome: Progressing  10/16/2023 1906 by Isabelle Ellis RN  Outcome: Progressing     Problem: ABCDS Injury Assessment  Goal: Absence of physical injury  55/23/0790 2289 by Serena Zamorano RN  Outcome: Progressing  10/16/2023 1906 by Isabelle Ellis RN  Outcome: Progressing
Problem: Discharge Planning  Goal: Discharge to home or other facility with appropriate resources  Outcome: Progressing
Problem: Discharge Planning  Goal: Discharge to home or other facility with appropriate resources  Outcome: Progressing     Problem: Discharge Planning  Goal: Discharge to home or other facility with appropriate resources  Outcome: Progressing
Problem: Discharge Planning  Goal: Discharge to home or other facility with appropriate resources  Outcome: Progressing     Problem: Pain  Goal: Verbalizes/displays adequate comfort level or baseline comfort level  Outcome: Progressing     Problem: Safety - Adult  Goal: Free from fall injury  Outcome: Progressing
Problem: Discharge Planning  Goal: Discharge to home or other facility with appropriate resources  Outcome: Progressing     Problem: Pain  Goal: Verbalizes/displays adequate comfort level or baseline comfort level  Outcome: Progressing     Problem: Safety - Adult  Goal: Free from fall injury  Outcome: Progressing     Problem: ABCDS Injury Assessment  Goal: Absence of physical injury  Outcome: Progressing
Problem: Discharge Planning  Goal: Discharge to home or other facility with appropriate resources  Outcome: Progressing     Problem: Pain  Goal: Verbalizes/displays adequate comfort level or baseline comfort level  Outcome: Progressing     Problem: Safety - Adult  Goal: Free from fall injury  Outcome: Progressing     Problem: ABCDS Injury Assessment  Goal: Absence of physical injury  Outcome: Progressing     Problem: Respiratory - Adult  Goal: Achieves optimal ventilation and oxygenation  10/16/2023 1906 by Isabelle Ellis RN  Outcome: Progressing  10/16/2023 1759 by Jacques Crowell RCP  Outcome: Progressing     Problem: Skin/Tissue Integrity  Goal: Absence of new skin breakdown  Description: 1. Monitor for areas of redness and/or skin breakdown  2. Assess vascular access sites hourly  3. Every 4-6 hours minimum:  Change oxygen saturation probe site  4. Every 4-6 hours:  If on nasal continuous positive airway pressure, respiratory therapy assess nares and determine need for appliance change or resting period.   Outcome: Progressing
Problem: Discharge Planning  Goal: Discharge to home or other facility with appropriate resources  Outcome: Progressing     Problem: Pain  Goal: Verbalizes/displays adequate comfort level or baseline comfort level  Outcome: Progressing     Problem: Safety - Adult  Goal: Free from fall injury  Outcome: Progressing     Problem: ABCDS Injury Assessment  Goal: Absence of physical injury  Outcome: Progressing     Problem: Respiratory - Adult  Goal: Achieves optimal ventilation and oxygenation  Outcome: Progressing
Problem: Discharge Planning  Goal: Discharge to home or other facility with appropriate resources  Outcome: Progressing     Problem: Pain  Goal: Verbalizes/displays adequate comfort level or baseline comfort level  Outcome: Progressing     Problem: Safety - Adult  Goal: Free from fall injury  Outcome: Progressing     Problem: ABCDS Injury Assessment  Goal: Absence of physical injury  Outcome: Progressing     Problem: Respiratory - Adult  Goal: Achieves optimal ventilation and oxygenation  Outcome: Progressing     Problem: Skin/Tissue Integrity  Goal: Absence of new skin breakdown  Description: 1. Monitor for areas of redness and/or skin breakdown  2. Assess vascular access sites hourly  3. Every 4-6 hours minimum:  Change oxygen saturation probe site  4. Every 4-6 hours:  If on nasal continuous positive airway pressure, respiratory therapy assess nares and determine need for appliance change or resting period.   Outcome: Progressing     Problem: Nutrition Deficit:  Goal: Optimize nutritional status  Outcome: Progressing
Problem: Discharge Planning  Goal: Discharge to home or other facility with appropriate resources  Outcome: Progressing     Problem: Pain  Goal: Verbalizes/displays adequate comfort level or baseline comfort level  Outcome: Progressing     Problem: Safety - Adult  Goal: Free from fall injury  Outcome: Progressing     Problem: ABCDS Injury Assessment  Goal: Absence of physical injury  Outcome: Progressing     Problem: Respiratory - Adult  Goal: Achieves optimal ventilation and oxygenation  Outcome: Progressing     Problem: Skin/Tissue Integrity  Goal: Absence of new skin breakdown  Description: 1. Monitor for areas of redness and/or skin breakdown  2. Assess vascular access sites hourly  3. Every 4-6 hours minimum:  Change oxygen saturation probe site  4. Every 4-6 hours:  If on nasal continuous positive airway pressure, respiratory therapy assess nares and determine need for appliance change or resting period.   Outcome: Progressing     Problem: Nutrition Deficit:  Goal: Optimize nutritional status  Outcome: Progressing
Problem: Discharge Planning  Goal: Discharge to home or other facility with appropriate resources  Outcome: Progressing     Problem: Pain  Goal: Verbalizes/displays adequate comfort level or baseline comfort level  Outcome: Progressing     Problem: Safety - Adult  Goal: Free from fall injury  Outcome: Progressing  Flowsheets (Taken 10/18/2023 0700)  Free From Fall Injury: Instruct family/caregiver on patient safety     Problem: ABCDS Injury Assessment  Goal: Absence of physical injury  Outcome: Progressing  Flowsheets (Taken 10/18/2023 0700)  Absence of Physical Injury: Implement safety measures based on patient assessment     Problem: Respiratory - Adult  Goal: Achieves optimal ventilation and oxygenation  10/18/2023 1815 by Fitz Romeo RN  Outcome: Progressing  10/18/2023 0920 by Shonna aLmb RCP  Outcome: Progressing     Problem: Skin/Tissue Integrity  Goal: Absence of new skin breakdown  Description: 1. Monitor for areas of redness and/or skin breakdown  2. Assess vascular access sites hourly  3. Every 4-6 hours minimum:  Change oxygen saturation probe site  4. Every 4-6 hours:  If on nasal continuous positive airway pressure, respiratory therapy assess nares and determine need for appliance change or resting period.   Outcome: Progressing     Problem: Nutrition Deficit:  Goal: Optimize nutritional status  Outcome: Progressing
Problem: Pain  Goal: Verbalizes/displays adequate comfort level or baseline comfort level  Outcome: Progressing     Problem: Safety - Adult  Goal: Free from fall injury  Outcome: Progressing
Problem: Respiratory - Adult  Goal: Achieves optimal ventilation and oxygenation  10/18/2023 0259 by Jaciel Molina RCP  Outcome: Progressing
Problem: Respiratory - Adult  Goal: Achieves optimal ventilation and oxygenation  10/18/2023 0920 by Preston Lopez RCP  Outcome: Progressing     BRONCHOSPASM/BRONCHOCONSTRICTION     [x]         IMPROVE AERATION/BREATH SOUNDS  [x]   ADMINISTER BRONCHODILATOR THERAPY AS APPROPRIATE  [x]   ASSESS BREATH SOUNDS  []   IMPLEMENT AEROSOL/MDI PROTOCOL  [x]   PATIENT EDUCATION AS NEEDED      MOBILIZE SECRETIONS    [x]   ASSESS BREATH SOUNDS  [x]   ASSESS SPUTUM PRODUCTION  [x]   PATIENT/FAMILY EDUCATION AS NEEDED      ATELECTASIS     [x]  PREVENT ATELECTASIS  [x]   ASSESS BREATH SOUNDS  [x]  INCENTIVE SPIROMETRY INSTRUCTION  [x]   PATIENT/FAMILY EDUCATION AS NEEDED    CPT as ordered
Problem: Respiratory - Adult  Goal: Achieves optimal ventilation and oxygenation  10/20/2023 1957 by Cherelle Montemayor RCP  Outcome: Progressing
Problem: Respiratory - Adult  Goal: Achieves optimal ventilation and oxygenation  10/21/2023 0829 by Sadie Larson RCP  Outcome: Progressing   PROVIDE ADEQUATE OXYGENATION WITH ACCEPTABLE SP02/ABG'S    [x]  IDENTIFY APPROPRIATE OXYGEN THERAPY  [x]   MONITOR SP02/ABG'S AS NEEDED   [x]   PATIENT EDUCATION AS NEEDED   BRONCHOSPASM/BRONCHOCONSTRICTION     [x]         IMPROVE AERATION/BREATH SOUNDS  [x]   ADMINISTER BRONCHODILATOR THERAPY AS APPROPRIATE  [x]   ASSESS BREATH SOUNDS  [x]   IMPLEMENT AEROSOL/MDI PROTOCOL  [x]   PATIENT EDUCATION AS NEEDED
Problem: Respiratory - Adult  Goal: Achieves optimal ventilation and oxygenation  10/21/2023 1951 by Oly Carrasco RCP  Outcome: Progressing
Problem: Respiratory - Adult  Goal: Achieves optimal ventilation and oxygenation  10/22/2023 2222 by Napoleon Carvajal RCP  Outcome: Progressing
Problem: Respiratory - Adult  Goal: Achieves optimal ventilation and oxygenation  10/23/2023 1155 by Ben Giron RCP  Outcome: Progressing   PROVIDE ADEQUATE OXYGENATION WITH ACCEPTABLE SP02/ABG'S    [x]  IDENTIFY APPROPRIATE OXYGEN THERAPY  [x]   MONITOR SP02/ABG'S AS NEEDED   [x]   PATIENT EDUCATION AS NEEDED   BRONCHOSPASM/BRONCHOCONSTRICTION     [x]         IMPROVE AERATION/BREATH SOUNDS  [x]   ADMINISTER BRONCHODILATOR THERAPY AS APPROPRIATE  [x]   ASSESS BREATH SOUNDS  [x]   IMPLEMENT AEROSOL/MDI PROTOCOL  [x]   PATIENT EDUCATION AS NEEDED
Problem: Respiratory - Adult  Goal: Achieves optimal ventilation and oxygenation  10/24/2023 0836 by Kelsey Bernal RCP  Flowsheets (Taken 10/24/2023 0530)  Achieves optimal ventilation and oxygenation: Respiratory therapy support as indicated
Problem: Respiratory - Adult  Goal: Achieves optimal ventilation and oxygenation  Outcome: Progressing        BRONCHOSPASM/BRONCHOCONSTRICTION     [x]         IMPROVE AERATION/BREATH SOUNDS  [x]   ADMINISTER BRONCHODILATOR THERAPY AS APPROPRIATE  [x]   ASSESS BREATH SOUNDS  []   IMPLEMENT AEROSOL/MDI PROTOCOL  [x]   PATIENT EDUCATION AS NEEDED    MOBILIZE SECRETIONS    [x]   ASSESS BREATH SOUNDS  [x]   ASSESS SPUTUM PRODUCTION  [x]   PATIENT/FAMILY EDUCATION AS NEEDED      ATELECTASIS     [x]  PREVENT ATELECTASIS  [x]   ASSESS BREATH SOUNDS  [x]  INCENTIVE SPIROMETRY INSTRUCTION  [x]   PATIENT/FAMILY EDUCATION AS NEEDED    CPT as ordered
Problem: Respiratory - Adult  Goal: Achieves optimal ventilation and oxygenation  Outcome: Progressing   BRONCHOSPASM/BRONCHOCONSTRICTION     [x]         IMPROVE AERATION/BREATH SOUNDS  [x]   ADMINISTER BRONCHODILATOR THERAPY AS APPROPRIATE  [x]   ASSESS BREATH SOUNDS  [x]   IMPLEMENT AEROSOL/MDI PROTOCOL  [x]   PATIENT EDUCATION AS NEEDED
of new skin breakdown  Description: 1. Monitor for areas of redness and/or skin breakdown  2. Assess vascular access sites hourly  3. Every 4-6 hours minimum:  Change oxygen saturation probe site  4. Every 4-6 hours:  If on nasal continuous positive airway pressure, respiratory therapy assess nares and determine need for appliance change or resting period.   10/25/2023 9395 by Lea Fowler RN  Outcome: Progressing  Note: Dependent areas inspected for skin breakdown   10/25/2023 0300 by Cintia Hernandez RN  Outcome: Progressing     Problem: Nutrition Deficit:  Goal: Optimize nutritional status  10/25/2023 0952 by Lea Fowler RN  Outcome: Progressing  Flowsheets (Taken 10/25/2023 3166)  Nutrient intake appropriate for improving, restoring, or maintaining nutritional needs: Monitor oral intake, labs, and treatment plans  10/25/2023 0300 by Cintia Hernandez RN  Outcome: Progressing

## 2023-10-25 NOTE — CARE COORDINATION
Transitional Planning:  Call from [place] at [facility], she has precert. Report:  [phone]  Fax:  [phone]    15:00  [facility] transport arranged for 4:30 pm  [employer]. Juanita bejarano. 15:35  [facility] faxed to SNF.

## 2023-10-25 NOTE — CARE COORDINATION
Transitional Planning:  Call to Cary [phone] at [facility]. She states they are still waiting on precert.

## 2023-10-25 NOTE — DISCHARGE SUMMARY
Report was called to Tenzin Forbes at [facility]. Pt's ex wife was called and notified of transfer. Pt dressed himself. [employer] transportation took pt via wheelchair with 3L oxygen. Cell phone,  and person grooming items left with patient.

## 2023-10-26 ENCOUNTER — HOSPITAL ENCOUNTER (OUTPATIENT)
Dept: NUCLEAR MEDICINE | Age: 63
Discharge: HOME | End: 2023-10-28
Attending: INTERNAL MEDICINE
Payer: COMMERCIAL

## 2023-10-26 ENCOUNTER — TELEPHONE (OUTPATIENT)
Dept: PRIMARY CARE CLINIC | Age: 63
End: 2023-10-26

## 2023-10-26 DIAGNOSIS — C15.5 MALIGNANT NEOPLASM OF LOWER THIRD OF ESOPHAGUS (HCC): ICD-10-CM

## 2023-10-26 LAB — GLUCOSE BLD-MCNC: 105 MG/DL (ref 75–110)

## 2023-10-26 PROCEDURE — 3430000000 HC RX DIAGNOSTIC RADIOPHARMACEUTICAL: Performed by: INTERNAL MEDICINE

## 2023-10-26 PROCEDURE — 82947 ASSAY GLUCOSE BLOOD QUANT: CPT

## 2023-10-26 PROCEDURE — 2580000003 HC RX 258: Performed by: INTERNAL MEDICINE

## 2023-10-26 PROCEDURE — 78815 PET IMAGE W/CT SKULL-THIGH: CPT

## 2023-10-26 PROCEDURE — A9552 F18 FDG: HCPCS | Performed by: INTERNAL MEDICINE

## 2023-10-26 RX ORDER — FLUDEOXYGLUCOSE F 18 200 MCI/ML
10 INJECTION, SOLUTION INTRAVENOUS
Status: COMPLETED | OUTPATIENT
Start: 2023-10-26 | End: 2023-10-26

## 2023-10-26 RX ORDER — SODIUM CHLORIDE 0.9 % (FLUSH) 0.9 %
10 SYRINGE (ML) INJECTION PRN
Status: DISCONTINUED | OUTPATIENT
Start: 2023-10-26 | End: 2023-10-29 | Stop reason: HOSPADM

## 2023-10-26 RX ADMIN — FLUDEOXYGLUCOSE F 18 11.3 MILLICURIE: 200 INJECTION, SOLUTION INTRAVENOUS at 12:40

## 2023-10-26 RX ADMIN — SODIUM CHLORIDE, PRESERVATIVE FREE 10 ML: 5 INJECTION INTRAVENOUS at 12:40

## 2023-10-26 NOTE — TELEPHONE ENCOUNTER
[id number] [facility]. Called stating the MRI of cervical spine wo contrast was denied. They would like you to call them at [phone]. The tracking number for the MRI is [id number].

## 2023-10-26 NOTE — TELEPHONE ENCOUNTER
Please reach out to find EMG that was done on patient. He states this was completed but I don' see it in system and this is why the MRI was denied.

## 2023-10-30 ENCOUNTER — HOSPITAL ENCOUNTER (OUTPATIENT)
Dept: MRI IMAGING | Age: 63
Discharge: HOME | End: 2023-11-01
Payer: COMMERCIAL

## 2023-10-30 DIAGNOSIS — M62.542 ATROPHY OF LEFT HAND MUSCLES: ICD-10-CM

## 2023-10-30 DIAGNOSIS — R20.0 NUMBNESS AND TINGLING IN LEFT HAND: ICD-10-CM

## 2023-10-30 DIAGNOSIS — R20.2 NUMBNESS AND TINGLING IN LEFT HAND: ICD-10-CM

## 2023-10-30 PROCEDURE — 72141 MRI NECK SPINE W/O DYE: CPT

## 2023-10-31 ENCOUNTER — OFFICE VISIT (OUTPATIENT)
Dept: PRIMARY CARE CLINIC | Age: 63
End: 2023-10-31
Payer: COMMERCIAL

## 2023-10-31 VITALS
HEIGHT: 70 IN | BODY MASS INDEX: 25.31 KG/M2 | DIASTOLIC BLOOD PRESSURE: 62 MMHG | HEART RATE: 84 BPM | SYSTOLIC BLOOD PRESSURE: 110 MMHG | WEIGHT: 176.8 LBS | OXYGEN SATURATION: 99 %

## 2023-10-31 DIAGNOSIS — K74.69 DECOMPENSATED LIVER DISEASE (HCC): ICD-10-CM

## 2023-10-31 DIAGNOSIS — K70.30 ALCOHOLIC CIRRHOSIS, UNSPECIFIED WHETHER ASCITES PRESENT (HCC): ICD-10-CM

## 2023-10-31 DIAGNOSIS — K70.31 ALCOHOLIC CIRRHOSIS OF LIVER WITH ASCITES (HCC): ICD-10-CM

## 2023-10-31 DIAGNOSIS — G62.9 NEUROPATHY: Primary | ICD-10-CM

## 2023-10-31 DIAGNOSIS — C15.9 ESOPHAGEAL ADENOCARCINOMA (HCC): ICD-10-CM

## 2023-10-31 PROCEDURE — 3074F SYST BP LT 130 MM HG: CPT | Performed by: PHYSICIAN ASSISTANT

## 2023-10-31 PROCEDURE — 3078F DIAST BP <80 MM HG: CPT | Performed by: PHYSICIAN ASSISTANT

## 2023-10-31 PROCEDURE — 99214 OFFICE O/P EST MOD 30 MIN: CPT | Performed by: PHYSICIAN ASSISTANT

## 2023-10-31 RX ORDER — GABAPENTIN 100 MG/1
100 CAPSULE ORAL NIGHTLY
Qty: 30 CAPSULE | Refills: 0 | Status: SHIPPED | OUTPATIENT
Start: 2023-10-31 | End: 2023-11-30

## 2023-10-31 RX ORDER — FUROSEMIDE 20 MG/1
20 TABLET ORAL DAILY
COMMUNITY
Start: 2023-09-26 | End: 2023-10-31 | Stop reason: CLARIF

## 2023-10-31 RX ORDER — GABAPENTIN 100 MG/1
100 CAPSULE ORAL NIGHTLY
Qty: 90 CAPSULE | Refills: 1 | Status: SHIPPED | OUTPATIENT
Start: 2023-10-31 | End: 2023-10-31

## 2023-10-31 ASSESSMENT — ENCOUNTER SYMPTOMS
CHEST TIGHTNESS: 0
CONSTIPATION: 0
ABDOMINAL PAIN: 0
SORE THROAT: 0
ABDOMINAL DISTENTION: 0
COUGH: 0
SHORTNESS OF BREATH: 0
DIARRHEA: 0

## 2023-10-31 NOTE — PROGRESS NOTES
He is alert and oriented to person, place, and time. Psychiatric:         Mood and Affect: Mood normal.         Behavior: Behavior normal.         Thought Content: Thought content normal.         Assessment and Plan:          1. Neuropathy  -     gabapentin (NEURONTIN) 100 MG capsule; Take 1 capsule by mouth nightly for 30 days. Intended supply: 90 days, Disp-30 capsule, R-0Normal  2. Decompensated liver disease ([address])  3. Alcoholic cirrhosis, unspecified whether ascites present ([address])  4. Alcoholic cirrhosis of liver with ascites ([address])  5. Esophageal adenocarcinoma ([address])     Continue to follow with oncology and gastroenterology, MRI results not yet obtained. Will review once obtained. Likely needing neurosurgery referral.  Educated to use cocked up wrist splint for possible carpal tunnel. Trial of gabapentin to see if nerve pain relieved by this. Begin B12 sublingual.  Educated to go over PET scan results with oncology. Return for Follow up if symptoms persist or worsen. Patient given educational materials - see patient instructions. Discussed use, benefit, and side effects of prescribed medications. All patient questions answered. Pt voiced understanding. Reviewed health maintenance. Instructed to continue current medications, diet and exercise. Patient agreed with treatment plan. Follow up as directed.      Electronically signed by VASU Tomas on 10/31/2023 at 1:40 PM

## 2023-11-06 DIAGNOSIS — M54.12 CERVICAL RADICULOPATHY: Primary | ICD-10-CM

## 2023-11-07 ENCOUNTER — OFFICE VISIT (OUTPATIENT)
Dept: ONCOLOGY | Age: 63
End: 2023-11-07
Payer: COMMERCIAL

## 2023-11-07 ENCOUNTER — HOSPITAL ENCOUNTER (OUTPATIENT)
Age: 63
Discharge: HOME | End: 2023-11-07
Payer: COMMERCIAL

## 2023-11-07 ENCOUNTER — TELEPHONE (OUTPATIENT)
Dept: ONCOLOGY | Age: 63
End: 2023-11-07

## 2023-11-07 VITALS
BODY MASS INDEX: 26.06 KG/M2 | WEIGHT: 181.6 LBS | HEART RATE: 106 BPM | SYSTOLIC BLOOD PRESSURE: 127 MMHG | DIASTOLIC BLOOD PRESSURE: 76 MMHG | TEMPERATURE: 97.6 F

## 2023-11-07 DIAGNOSIS — C15.5 MALIGNANT NEOPLASM OF LOWER THIRD OF ESOPHAGUS (HCC): Primary | ICD-10-CM

## 2023-11-07 DIAGNOSIS — C15.5 MALIGNANT NEOPLASM OF LOWER THIRD OF ESOPHAGUS (HCC): ICD-10-CM

## 2023-11-07 DIAGNOSIS — C15.9 ESOPHAGEAL ADENOCARCINOMA (HCC): ICD-10-CM

## 2023-11-07 LAB
ALBUMIN SERPL-MCNC: 2.2 G/DL (ref 3.5–5.2)
ALBUMIN/GLOB SERPL: 0.7 {RATIO} (ref 1–2.5)
ALP SERPL-CCNC: 243 U/L (ref 40–129)
ALT SERPL-CCNC: 14 U/L (ref 5–41)
ANION GAP SERPL CALCULATED.3IONS-SCNC: 10 MMOL/L (ref 9–17)
AST SERPL-CCNC: 51 U/L
BASOPHILS # BLD: 0.04 K/UL (ref 0–0.2)
BASOPHILS NFR BLD: 1 % (ref 0–2)
BILIRUB SERPL-MCNC: 1 MG/DL (ref 0.3–1.2)
BUN SERPL-MCNC: 9 MG/DL (ref 8–23)
CALCIUM SERPL-MCNC: 8.1 MG/DL (ref 8.6–10.4)
CHLORIDE SERPL-SCNC: 103 MMOL/L (ref 98–107)
CO2 SERPL-SCNC: 26 MMOL/L (ref 20–31)
CREAT SERPL-MCNC: 0.8 MG/DL (ref 0.7–1.2)
EOSINOPHIL # BLD: 0.33 K/UL (ref 0–0.4)
EOSINOPHILS RELATIVE PERCENT: 4 % (ref 1–4)
ERYTHROCYTE [DISTWIDTH] IN BLOOD BY AUTOMATED COUNT: 13.3 % (ref 12.5–15.4)
GFR SERPL CREATININE-BSD FRML MDRD: >60 ML/MIN/1.73M2
GLUCOSE SERPL-MCNC: 111 MG/DL (ref 70–99)
HCT VFR BLD AUTO: 29.7 % (ref 41–53)
HGB BLD-MCNC: 9.6 G/DL (ref 13.5–17.5)
LYMPHOCYTES NFR BLD: 1.22 K/UL (ref 1–4.8)
LYMPHOCYTES RELATIVE PERCENT: 14 % (ref 24–44)
MCH RBC QN AUTO: 31.4 PG (ref 26–34)
MCHC RBC AUTO-ENTMCNC: 32.3 G/DL (ref 31–37)
MCV RBC AUTO: 97.1 FL (ref 80–100)
MONOCYTES NFR BLD: 0.97 K/UL (ref 0.1–1.2)
MONOCYTES NFR BLD: 11 % (ref 2–11)
NEUTROPHILS NFR BLD: 70 % (ref 36–66)
NEUTS SEG NFR BLD: 5.93 K/UL (ref 1.8–7.7)
PLATELET # BLD AUTO: 176 K/UL (ref 140–450)
PMV BLD AUTO: 9.5 FL (ref 8–14)
POTASSIUM SERPL-SCNC: 4 MMOL/L (ref 3.7–5.3)
PROT SERPL-MCNC: 5.4 G/DL (ref 6.4–8.3)
RBC # BLD AUTO: 3.06 M/UL (ref 4.5–5.9)
SODIUM SERPL-SCNC: 139 MMOL/L (ref 135–144)
WBC OTHER # BLD: 8.5 K/UL (ref 3.5–11)

## 2023-11-07 PROCEDURE — 3074F SYST BP LT 130 MM HG: CPT | Performed by: INTERNAL MEDICINE

## 2023-11-07 PROCEDURE — 86301 IMMUNOASSAY TUMOR CA 19-9: CPT

## 2023-11-07 PROCEDURE — 82378 CARCINOEMBRYONIC ANTIGEN: CPT

## 2023-11-07 PROCEDURE — 99214 OFFICE O/P EST MOD 30 MIN: CPT | Performed by: INTERNAL MEDICINE

## 2023-11-07 PROCEDURE — 85025 COMPLETE CBC W/AUTO DIFF WBC: CPT

## 2023-11-07 PROCEDURE — 36415 COLL VENOUS BLD VENIPUNCTURE: CPT

## 2023-11-07 PROCEDURE — 3078F DIAST BP <80 MM HG: CPT | Performed by: INTERNAL MEDICINE

## 2023-11-07 PROCEDURE — 80053 COMPREHEN METABOLIC PANEL: CPT

## 2023-11-07 PROCEDURE — 99211 OFF/OP EST MAY X REQ PHY/QHP: CPT | Performed by: INTERNAL MEDICINE

## 2023-11-07 RX ORDER — CEPHALEXIN 500 MG/1
500 CAPSULE ORAL 2 TIMES DAILY
Qty: 14 CAPSULE | Refills: 0 | Status: SHIPPED | OUTPATIENT
Start: 2023-11-07 | End: 2023-11-14

## 2023-11-07 NOTE — TELEPHONE ENCOUNTER
AVS from 11/7/23      RTC in 6 weeks   Cancel CT and next appt      Rv sched for 12/19 @ 1130    Pt was given AVS and appointment schedule    Electronically signed by Alireza Dominique on 11/7/2023 at 3:06 PM

## 2023-11-08 ENCOUNTER — TELEPHONE (OUTPATIENT)
Dept: PRIMARY CARE CLINIC | Age: 63
End: 2023-11-08

## 2023-11-08 LAB
CANCER AG19-9 SERPL IA-ACNC: 77 U/ML (ref 0–35)
CEA SERPL-MCNC: 11.9 NG/ML

## 2023-11-08 NOTE — TELEPHONE ENCOUNTER
Jenny Prieto from [facility] calling. Pt had his  colostomy  reversed on 10/10 @ [facility]. Went to [facility] [employer] and left on his own due to [facility] care, per pt. Pt has a 4 cm by 5 cm, 1 \" deep wound that is infected. Surgeon put him on an abx. Jenny Prieto will be getting the Surgeons plans re the wound as well. Also pt declined PT and OT that rehab ordered. Pt is doing well at home. Did notice alcohol on his breath. States he is a nice ana, just seems like everything for him goes [address]. Asking if we can fax her an updated med list? Will have a fax # when we call her back. Can you review med list and advise if ok?  CODY R4 GYN--Backnote from 830pm     Pt seen and examined in ED with Dr. Rubi. In short, patient is a 46 P2 PMH T1DM POD#5 from laparoscopic right salpingo-oophorectomy, ureterolysis for 12cm right ovarian mass who presented to ED with fevers, left flank pain, NV earlier this afternoon. She was febrile to 38.1 with WBC 18. She was found to be in DKA with FS on admission of 430 and AG of 23, insulin gtt started in ED. CT scan showing suspected bilateral pyonephritis, no evidence of abscess or other postop complication. Case discussed with SICU and MICU, patient felt to be best candidate for MICU care due to DKA and possible pyelonephritis. GYN will continue to follow closely while inpatient     Constanza Steiner PGY4 R4 GYN--Backnote from 830pm     Pt seen and examined in ED with Dr. Rubi. In short, patient is a 46 P2 PMH T1DM POD#5 from laparoscopic right salpingo-oophorectomy, ureterolysis for 12cm right ovarian mass who presented to ED with fevers, left flank pain, NV earlier this afternoon. She was febrile to 38.1 with WBC 18. She was found to be in DKA with FS on admission of 430 and AG of 23, insulin gtt started in ED. CT scan showing suspected bilateral pyonephritis, no evidence of abscess or other postop complication. Case discussed with SICU and MICU, patient felt to be best candidate for MICU care due to DKA and possible pyelonephritis. GYN will continue to follow closely while inpatient     Constanza Steiner PGY4    GYN Attending    Pt with hx of recent (POD#5) laparoscopic RSO for right ovarian mass.  Pt however today presents with pyelonephritis, possible sepsis and DKA as a result. R4 GYN--Backnote from 830pm     Pt seen and examined in ED with Dr. Rubi. In short, patient is a 46 P2 PMH T1DM POD#5 from laparoscopic right salpingo-oophorectomy, ureterolysis for 12cm right ovarian mass who presented to ED with fevers, left flank pain, NV earlier this afternoon. She was febrile to 38.1 with WBC 18. She was found to be in DKA with FS on admission of 430 and AG of 23, insulin gtt started in ED. CT scan showing suspected bilateral pyonephritis, no evidence of abscess or other postop complication. Case discussed with SICU and MICU, patient felt to be best candidate for MICU care due to DKA and possible pyelonephritis. GYN will continue to follow closely while inpatient     Constanza Steiner PGY4    GYN Attending    Pt with hx of recent (POD#5) laparoscopic RSO for right ovarian mass.  Pt however today presents with pyelonephritis, possible sepsis and DKA as a result.  +CVAT on PE.   CT scan reviewed and no acute Gyn intervention necessary at this time--bilateral pyelonephritis noted.  Abdominal incisions are healing well with no edema/erythema/purulence.  Pt to go MICU for medical management of DKA and pyonephritis.  Gyn will continue to follow however.    AMELIA Rubi

## 2023-11-13 ENCOUNTER — OFFICE VISIT (OUTPATIENT)
Dept: GASTROENTEROLOGY | Age: 63
End: 2023-11-13
Payer: COMMERCIAL

## 2023-11-13 VITALS
DIASTOLIC BLOOD PRESSURE: 64 MMHG | HEART RATE: 91 BPM | HEIGHT: 70 IN | SYSTOLIC BLOOD PRESSURE: 97 MMHG | WEIGHT: 181 LBS | BODY MASS INDEX: 25.91 KG/M2

## 2023-11-13 DIAGNOSIS — C15.5 MALIGNANT NEOPLASM OF LOWER THIRD OF ESOPHAGUS (HCC): ICD-10-CM

## 2023-11-13 DIAGNOSIS — K22.711 BARRETT'S ESOPHAGUS WITH HIGH GRADE DYSPLASIA: ICD-10-CM

## 2023-11-13 DIAGNOSIS — Z98.890 STATUS POST LAPAROTOMY: Primary | ICD-10-CM

## 2023-11-13 DIAGNOSIS — R97.0 HIGH CARCINOEMBRYONIC ANTIGEN (CEA): ICD-10-CM

## 2023-11-13 DIAGNOSIS — K74.69 DECOMPENSATED LIVER DISEASE (HCC): ICD-10-CM

## 2023-11-13 PROCEDURE — 99214 OFFICE O/P EST MOD 30 MIN: CPT | Performed by: INTERNAL MEDICINE

## 2023-11-13 PROCEDURE — 3078F DIAST BP <80 MM HG: CPT | Performed by: INTERNAL MEDICINE

## 2023-11-13 PROCEDURE — 3074F SYST BP LT 130 MM HG: CPT | Performed by: INTERNAL MEDICINE

## 2023-11-13 RX ORDER — SPIRONOLACTONE 25 MG/1
25 TABLET ORAL DAILY
Qty: 30 TABLET | Refills: 2 | Status: SHIPPED | OUTPATIENT
Start: 2023-11-13

## 2023-11-13 RX ORDER — NADOLOL 20 MG/1
20 TABLET ORAL DAILY
Qty: 30 TABLET | Refills: 3 | Status: SHIPPED | OUTPATIENT
Start: 2023-11-13

## 2023-11-13 ASSESSMENT — ENCOUNTER SYMPTOMS
ABDOMINAL PAIN: 1
WHEEZING: 0
SINUS PRESSURE: 0
COUGH: 0
ANAL BLEEDING: 0
NAUSEA: 1
BLOOD IN STOOL: 0
TROUBLE SWALLOWING: 0
VOICE CHANGE: 0
CHOKING: 0
BACK PAIN: 0
RECTAL PAIN: 0
VOMITING: 0
DIARRHEA: 0
SORE THROAT: 0
ABDOMINAL DISTENTION: 0
CONSTIPATION: 0

## 2023-11-13 NOTE — PROGRESS NOTES
1 capsule by mouth 2 times daily for 30 days.  Max Daily Amount: 5 mg, Disp: 60 capsule, Rfl: 0    lactobacillus (CULTURELLE) capsule, Take 1 capsule by mouth daily, Disp: , Rfl:     ondansetron (ZOFRAN-ODT) 4 MG disintegrating tablet, dissolve 1 tablet by mouth every 8 hours if needed for nausea and vomiting, Disp: 90 tablet, Rfl: 5    pantoprazole (PROTONIX) 40 MG tablet, take 1 tablet by mouth once daily, Disp: 30 tablet, Rfl: 0    ondansetron (ZOFRAN) 4 MG tablet, Take 1 tablet by mouth 3 times daily as needed for Nausea or Vomiting, Disp: 30 tablet, Rfl: 0    lactulose (CHRONULAC) 10 GM/15ML solution, take 30 milliliters by mouth three times a day, Disp: 2700 mL, Rfl: 3    magnesium oxide (MAG-OX) 250 MG TABS tablet, take 1 tablet by mouth daily, Disp: 30 tablet, Rfl: 5    acetaminophen (TYLENOL) 325 MG tablet, Take 2 tablets by mouth every 6 hours as needed for Pain, Disp: , Rfl:     furosemide (LASIX) 40 MG tablet, Take 0.5 tablets by mouth daily, Disp: , Rfl:     FEROSUL 325 (65 Fe) MG tablet, take 1 tablet by mouth twice a day, Disp: 60 tablet, Rfl: 5    ALLERGIES:   Allergies   Allergen Reactions    Pollen Extract Other (See Comments)     Runny nose, watery eyes       FAMILY HISTORY:       Problem Relation Age of Onset    Cancer Mother         pancreatic    Cancer Father         bone    Diabetes Sister     Asthma Brother     Allergies Brother         MULTIPLE         SOCIAL HISTORY:   Social History     Socioeconomic History    Marital status: Single     Spouse name: Not on file    Number of children: Not on file    Years of education: Not on file    Highest education level: Not on file   Occupational History    Not on file   Tobacco Use    Smoking status: Former     Packs/day: 1.00     Years: 45.00     Additional pack years: 0.00     Total pack years: 45.00     Types: Cigarettes     Quit date: 2017     Years since quittin.8    Smokeless tobacco: Never   Vaping Use    Vaping Use: Never used

## 2023-11-28 ENCOUNTER — HOSPITAL ENCOUNTER (OUTPATIENT)
Age: 63
Discharge: HOME | End: 2023-11-28
Payer: COMMERCIAL

## 2023-11-28 ENCOUNTER — OFFICE VISIT (OUTPATIENT)
Dept: ORTHOPEDIC SURGERY | Age: 63
End: 2023-11-28
Payer: COMMERCIAL

## 2023-11-28 VITALS — WEIGHT: 181 LBS | BODY MASS INDEX: 25.91 KG/M2 | HEIGHT: 70 IN | RESPIRATION RATE: 16 BRPM

## 2023-11-28 DIAGNOSIS — K74.69 DECOMPENSATED LIVER DISEASE (HCC): ICD-10-CM

## 2023-11-28 DIAGNOSIS — G62.9 NEUROPATHY: ICD-10-CM

## 2023-11-28 DIAGNOSIS — G89.29 NECK PAIN, CHRONIC: Primary | ICD-10-CM

## 2023-11-28 DIAGNOSIS — M48.02 FORAMINAL STENOSIS OF CERVICAL REGION: ICD-10-CM

## 2023-11-28 DIAGNOSIS — G56.23 CUBITAL TUNNEL SYNDROME OF BOTH UPPER EXTREMITIES: ICD-10-CM

## 2023-11-28 DIAGNOSIS — M54.2 NECK PAIN, CHRONIC: Primary | ICD-10-CM

## 2023-11-28 LAB
ALBUMIN SERPL-MCNC: 2.2 G/DL (ref 3.5–5.2)
ALP SERPL-CCNC: 264 U/L (ref 40–129)
ALT SERPL-CCNC: 14 U/L (ref 5–41)
ANION GAP SERPL CALCULATED.3IONS-SCNC: 8 MMOL/L (ref 9–17)
AST SERPL-CCNC: 66 U/L
BASOPHILS # BLD: 0 K/UL (ref 0–0.2)
BASOPHILS NFR BLD: 0 % (ref 0–2)
BILIRUB SERPL-MCNC: 1.3 MG/DL (ref 0.3–1.2)
BUN SERPL-MCNC: 8 MG/DL (ref 8–23)
CALCIUM SERPL-MCNC: 7.8 MG/DL (ref 8.6–10.4)
CHLORIDE SERPL-SCNC: 101 MMOL/L (ref 98–107)
CO2 SERPL-SCNC: 31 MMOL/L (ref 20–31)
CREAT SERPL-MCNC: 1 MG/DL (ref 0.7–1.2)
EOSINOPHIL # BLD: 0.1 K/UL (ref 0–0.4)
EOSINOPHILS RELATIVE PERCENT: 2 % (ref 0–4)
ERYTHROCYTE [DISTWIDTH] IN BLOOD BY AUTOMATED COUNT: 15.4 % (ref 11.5–14.9)
GFR SERPL CREATININE-BSD FRML MDRD: >60 ML/MIN/1.73M2
GLUCOSE SERPL-MCNC: 116 MG/DL (ref 70–99)
HCT VFR BLD AUTO: 32.8 % (ref 41–53)
HGB BLD-MCNC: 11 G/DL (ref 13.5–17.5)
LYMPHOCYTES NFR BLD: 0.7 K/UL (ref 1–4.8)
LYMPHOCYTES RELATIVE PERCENT: 11 % (ref 24–44)
MCH RBC QN AUTO: 31.7 PG (ref 26–34)
MCHC RBC AUTO-ENTMCNC: 33.4 G/DL (ref 31–37)
MCV RBC AUTO: 95 FL (ref 80–100)
MONOCYTES NFR BLD: 0.9 K/UL (ref 0.1–1.3)
MONOCYTES NFR BLD: 14 % (ref 1–7)
NEUTROPHILS NFR BLD: 73 % (ref 36–66)
NEUTS SEG NFR BLD: 4.7 K/UL (ref 1.3–9.1)
PLATELET # BLD AUTO: 145 K/UL (ref 150–450)
PMV BLD AUTO: 7.3 FL (ref 6–12)
POTASSIUM SERPL-SCNC: 3.6 MMOL/L (ref 3.7–5.3)
PROT SERPL-MCNC: 5.7 G/DL (ref 6.4–8.3)
RBC # BLD AUTO: 3.46 M/UL (ref 4.5–5.9)
SODIUM SERPL-SCNC: 140 MMOL/L (ref 135–144)
WBC OTHER # BLD: 6.5 K/UL (ref 3.5–11)

## 2023-11-28 PROCEDURE — 80053 COMPREHEN METABOLIC PANEL: CPT

## 2023-11-28 PROCEDURE — 85025 COMPLETE CBC W/AUTO DIFF WBC: CPT

## 2023-11-28 PROCEDURE — 36415 COLL VENOUS BLD VENIPUNCTURE: CPT

## 2023-11-28 PROCEDURE — 99203 OFFICE O/P NEW LOW 30 MIN: CPT | Performed by: ORTHOPAEDIC SURGERY

## 2023-11-28 NOTE — PROGRESS NOTES
Appearance: well-developed. HENT:      Head: Normocephalic and atraumatic. Nose: Nose normal.   Eyes:      Conjunctiva/sclera: Conjunctivae normal.   Neck:      Musculoskeletal: Normal range of motion and neck supple. Pulmonary:      Effort: Pulmonary effort is normal. No respiratory distress. Musculoskeletal:      Comments: Normal gait     Skin:     General: Skin is warm and dry. Neurological:      Mental Status: Alert and oriented to person, place, and time. Sensory: No sensory deficit. Psychiatric:         Behavior: Behavior normal.         Thought Content: Thought content normal.    Significant intrinsic wasting very subjective dysesthesia small and ring finger greatest right and left hands    Patient with negative ulnar stretch and Tinel's at the elbow    Patient with negative Phalen's    With respect to neck normal range of motion largely negative Spurling's    Provider Attestation:  Rosie Cueva, personally performed the services described in this documentation. All medical record entries made by the scribe were at my direction and in my presence. I have reviewed the chart and discharge instructions and agree that the records reflect my personal performance and is accurate and complete. Kathi Bradley MD 11/28/23       Scribe Attestation:  By signing my name below, Herb Jimenez, attest that this documentation has been prepared under the direction and in the presence of Dr. Lexi Lawson. Electronically signed: Jazmine Campos, 11/28/23     Please note that this chart was generated using voice recognition Dragon dictation software. Although every effort was made to ensure the accuracy of this automated transcription, some errors in transcription may have occurred.

## 2023-12-01 ENCOUNTER — OFFICE VISIT (OUTPATIENT)
Dept: GASTROENTEROLOGY | Age: 63
End: 2023-12-01
Payer: COMMERCIAL

## 2023-12-01 VITALS
TEMPERATURE: 97.5 F | DIASTOLIC BLOOD PRESSURE: 75 MMHG | WEIGHT: 181.8 LBS | BODY MASS INDEX: 26.09 KG/M2 | SYSTOLIC BLOOD PRESSURE: 133 MMHG

## 2023-12-01 DIAGNOSIS — K70.31 ASCITES DUE TO ALCOHOLIC CIRRHOSIS (HCC): ICD-10-CM

## 2023-12-01 DIAGNOSIS — C15.5 MALIGNANT NEOPLASM OF LOWER THIRD OF ESOPHAGUS (HCC): ICD-10-CM

## 2023-12-01 DIAGNOSIS — K76.6 PORTAL HYPERTENSION (HCC): ICD-10-CM

## 2023-12-01 DIAGNOSIS — R97.0 HIGH CARCINOEMBRYONIC ANTIGEN (CEA): Primary | ICD-10-CM

## 2023-12-01 DIAGNOSIS — Z98.890 STATUS POST LAPAROTOMY: ICD-10-CM

## 2023-12-01 PROCEDURE — 3075F SYST BP GE 130 - 139MM HG: CPT | Performed by: INTERNAL MEDICINE

## 2023-12-01 PROCEDURE — 3078F DIAST BP <80 MM HG: CPT | Performed by: INTERNAL MEDICINE

## 2023-12-01 PROCEDURE — 99214 OFFICE O/P EST MOD 30 MIN: CPT | Performed by: INTERNAL MEDICINE

## 2023-12-01 RX ORDER — PANTOPRAZOLE SODIUM 40 MG/1
40 TABLET, DELAYED RELEASE ORAL DAILY
Qty: 30 TABLET | Refills: 3 | Status: SHIPPED | OUTPATIENT
Start: 2023-12-01

## 2023-12-01 ASSESSMENT — ENCOUNTER SYMPTOMS
ANAL BLEEDING: 0
ABDOMINAL DISTENTION: 0
CONSTIPATION: 0
COUGH: 0
COLOR CHANGE: 0
VOMITING: 0
SORE THROAT: 0
TROUBLE SWALLOWING: 0
RECTAL PAIN: 0
ABDOMINAL PAIN: 0
BLOOD IN STOOL: 0
WHEEZING: 0
DIARRHEA: 0
SHORTNESS OF BREATH: 0
NAUSEA: 1
CHOKING: 0

## 2023-12-01 NOTE — PROGRESS NOTES
11/28/2023         DIAGNOSTIC TESTING:     No results found. Assessment  No diagnosis found. Plan  At present patient is stable. Unfortunately he is still consuming beer intermittently. Concerning his that his CEA level is high. Need to reevaluate esophageal pathology/cancer, also colonic pathology. As his wound is not healed well, we may have to wait another 3 to 4 weeks prior to scheduling him for colonoscopy. Meanwhile to continue present diuretic therapy, nadolol and pantoprazole. Advised to titrate lactulose such that he has couple of loose bowel movements a day. Discussed with the patient regarding importance of salt restricted diet. Also discussed with the patient regarding nutrition. His albumin levels are low. After discussion both patient and his ex-wife understood and agreed to try nutritious diet. Strongly encouraged the patient not to drink alcohol. We will repeat his CEA level, and also renal function and hemoglobin in January 2024. At that time we will reevaluate the patient    Thank you for allowing me to participate in the care of Mr. Robel Luna. For any further questions please do not hesitate to contact me. I have reviewed and agree with the ROS entered by the MA/LPN. Note is dictated utilizing voice recognition software. Unfortunately this leads to occasional typographical errors.  Please contact our office if you have any questions        Jason Collins MD,FACP, [facility]  Board Certified in Gastroenterology and [address] [place] [address] Gastroenterology  Office #: [phone]

## 2023-12-04 ENCOUNTER — TELEPHONE (OUTPATIENT)
Dept: PRIMARY CARE CLINIC | Age: 63
End: 2023-12-04

## 2023-12-04 NOTE — TELEPHONE ENCOUNTER
Pt's spouse calling. Pt would like a 2nd opinion for his neck and spine issues. Someone not in Dr. Tristan Bardales group. Did not hit it off real well with him. Please place referral and let spouse know. T.Y. Aware you are out until Wed.

## 2023-12-06 DIAGNOSIS — M54.10 RADICULOPATHY, UNSPECIFIED SPINAL REGION: Primary | ICD-10-CM

## 2023-12-06 NOTE — TELEPHONE ENCOUNTER
Slim for referral to Dr. Ben Davis for further treatment. Dx: same as it was for referral to Dr. Paul Bee.

## 2023-12-20 NOTE — ED NOTES
A/O X 3,  Skin warm and dry, Respirations quiet and easy. Lungs diminished t/o posteriorly. Heat sounds regular. Monitor shows NSR. Denies chest pain. Bowel sounds present x 4 quadrants, abdomen is soft, c/o lower abdominal pain, Has Ileostomy draining dark, green liquid. Skin is reddened around Ileostomy. Peripheral pulses palpable, neg edema.       Basil Weber RN  07/31/23 1943 [de-identified] : Today I recommend a cortisone injection for the right knee, the patient agreed.  The right knee was injected with 2 cc lidocaine, 1 cc dexamethasone.  Procedure note generated.  She will start therapy for the right knee, Rx provided for that.  I will see her back in 3 months for further evaluation. [facility]

## 2024-01-02 ENCOUNTER — HOSPITAL ENCOUNTER (INPATIENT)
Age: 64
LOS: 9 days | Discharge: NURSING, REHAB OR LONG-TERM CARE | DRG: 897 | End: 2024-01-11
Attending: EMERGENCY MEDICINE | Admitting: INTERNAL MEDICINE
Payer: COMMERCIAL

## 2024-01-02 DIAGNOSIS — R11.0 NAUSEA: ICD-10-CM

## 2024-01-02 DIAGNOSIS — R53.1 GENERAL WEAKNESS: Primary | ICD-10-CM

## 2024-01-02 DIAGNOSIS — D64.9 ANEMIA, UNSPECIFIED TYPE: ICD-10-CM

## 2024-01-02 DIAGNOSIS — T17.908A ASPIRATION OF FOREIGN BODY, INITIAL ENCOUNTER: ICD-10-CM

## 2024-01-02 DIAGNOSIS — R11.2 INTRACTABLE NAUSEA AND VOMITING: ICD-10-CM

## 2024-01-02 DIAGNOSIS — K70.30 ALCOHOLIC CIRRHOSIS, UNSPECIFIED WHETHER ASCITES PRESENT (HCC): ICD-10-CM

## 2024-01-02 LAB
ALBUMIN SERPL-MCNC: 2.4 G/DL (ref 3.5–5.2)
ALP SERPL-CCNC: 476 U/L (ref 40–129)
ALT SERPL-CCNC: 27 U/L (ref 5–41)
ANION GAP SERPL CALCULATED.3IONS-SCNC: 14 MMOL/L (ref 9–17)
AST SERPL-CCNC: 132 U/L
BACTERIA URNS QL MICRO: ABNORMAL
BASOPHILS # BLD: 0 K/UL (ref 0–0.2)
BASOPHILS NFR BLD: 0 % (ref 0–2)
BILIRUB SERPL-MCNC: 3.9 MG/DL (ref 0.3–1.2)
BILIRUB UR QL STRIP: ABNORMAL
BUN SERPL-MCNC: 6 MG/DL (ref 8–23)
CALCIUM SERPL-MCNC: 7.8 MG/DL (ref 8.6–10.4)
CASTS #/AREA URNS LPF: ABNORMAL /LPF
CHLORIDE SERPL-SCNC: 98 MMOL/L (ref 98–107)
CLARITY UR: ABNORMAL
CO2 SERPL-SCNC: 25 MMOL/L (ref 20–31)
COLOR UR: ABNORMAL
CREAT SERPL-MCNC: 0.8 MG/DL (ref 0.7–1.2)
EOSINOPHIL # BLD: 0 K/UL (ref 0–0.4)
EOSINOPHILS RELATIVE PERCENT: 1 % (ref 0–4)
EPI CELLS #/AREA URNS HPF: ABNORMAL /HPF
ERYTHROCYTE [DISTWIDTH] IN BLOOD BY AUTOMATED COUNT: 14.4 % (ref 11.5–14.9)
FLUAV RNA RESP QL NAA+PROBE: NOT DETECTED
FLUBV RNA RESP QL NAA+PROBE: NOT DETECTED
GFR SERPL CREATININE-BSD FRML MDRD: >60 ML/MIN/1.73M2
GLUCOSE SERPL-MCNC: 93 MG/DL (ref 70–99)
GLUCOSE UR STRIP-MCNC: NEGATIVE MG/DL
HCT VFR BLD AUTO: 34.9 % (ref 41–53)
HGB BLD-MCNC: 11.8 G/DL (ref 13.5–17.5)
HGB UR QL STRIP.AUTO: ABNORMAL
KETONES UR STRIP-MCNC: ABNORMAL MG/DL
LEUKOCYTE ESTERASE UR QL STRIP: ABNORMAL
LIPASE SERPL-CCNC: 16 U/L (ref 13–60)
LYMPHOCYTES NFR BLD: 0.5 K/UL (ref 1–4.8)
LYMPHOCYTES RELATIVE PERCENT: 7 % (ref 24–44)
MCH RBC QN AUTO: 32 PG (ref 26–34)
MCHC RBC AUTO-ENTMCNC: 33.9 G/DL (ref 31–37)
MCV RBC AUTO: 94.7 FL (ref 80–100)
MONOCYTES NFR BLD: 0.7 K/UL (ref 0.1–1.3)
MONOCYTES NFR BLD: 10 % (ref 1–7)
NEUTROPHILS NFR BLD: 82 % (ref 36–66)
NEUTS SEG NFR BLD: 5.8 K/UL (ref 1.3–9.1)
NITRITE UR QL STRIP: POSITIVE
PH UR STRIP: 5.5 [PH] (ref 5–8)
PLATELET # BLD AUTO: 72 K/UL (ref 150–450)
PMV BLD AUTO: 7.6 FL (ref 6–12)
POTASSIUM SERPL-SCNC: 3.3 MMOL/L (ref 3.7–5.3)
PROT SERPL-MCNC: 5.9 G/DL (ref 6.4–8.3)
PROT UR STRIP-MCNC: ABNORMAL MG/DL
RBC # BLD AUTO: 3.69 M/UL (ref 4.5–5.9)
RBC #/AREA URNS HPF: ABNORMAL /HPF
SARS-COV-2 RNA RESP QL NAA+PROBE: NOT DETECTED
SODIUM SERPL-SCNC: 137 MMOL/L (ref 135–144)
SOURCE: NORMAL
SP GR UR STRIP: 1.01 (ref 1–1.03)
SPECIMEN DESCRIPTION: NORMAL
T4 FREE SERPL-MCNC: 1.2 NG/DL (ref 0.9–1.7)
TSH SERPL DL<=0.05 MIU/L-ACNC: 1.93 UIU/ML (ref 0.3–5)
UROBILINOGEN UR STRIP-ACNC: NORMAL EU/DL (ref 0–1)
WBC #/AREA URNS HPF: ABNORMAL /HPF
WBC OTHER # BLD: 7 K/UL (ref 3.5–11)

## 2024-01-02 PROCEDURE — 80053 COMPREHEN METABOLIC PANEL: CPT

## 2024-01-02 PROCEDURE — 36415 COLL VENOUS BLD VENIPUNCTURE: CPT

## 2024-01-02 PROCEDURE — 83690 ASSAY OF LIPASE: CPT

## 2024-01-02 PROCEDURE — G0378 HOSPITAL OBSERVATION PER HR: HCPCS

## 2024-01-02 PROCEDURE — 96372 THER/PROPH/DIAG INJ SC/IM: CPT

## 2024-01-02 PROCEDURE — 81001 URINALYSIS AUTO W/SCOPE: CPT

## 2024-01-02 PROCEDURE — 6370000000 HC RX 637 (ALT 250 FOR IP): Performed by: NURSE PRACTITIONER

## 2024-01-02 PROCEDURE — 85025 COMPLETE CBC W/AUTO DIFF WBC: CPT

## 2024-01-02 PROCEDURE — 1200000000 HC SEMI PRIVATE

## 2024-01-02 PROCEDURE — 6370000000 HC RX 637 (ALT 250 FOR IP): Performed by: EMERGENCY MEDICINE

## 2024-01-02 PROCEDURE — 96374 THER/PROPH/DIAG INJ IV PUSH: CPT

## 2024-01-02 PROCEDURE — 87636 SARSCOV2 & INF A&B AMP PRB: CPT

## 2024-01-02 PROCEDURE — 84439 ASSAY OF FREE THYROXINE: CPT

## 2024-01-02 PROCEDURE — 99285 EMERGENCY DEPT VISIT HI MDM: CPT

## 2024-01-02 PROCEDURE — 87077 CULTURE AEROBIC IDENTIFY: CPT

## 2024-01-02 PROCEDURE — 84443 ASSAY THYROID STIM HORMONE: CPT

## 2024-01-02 PROCEDURE — 6360000002 HC RX W HCPCS: Performed by: INTERNAL MEDICINE

## 2024-01-02 PROCEDURE — 99223 1ST HOSP IP/OBS HIGH 75: CPT | Performed by: INTERNAL MEDICINE

## 2024-01-02 PROCEDURE — 96375 TX/PRO/DX INJ NEW DRUG ADDON: CPT

## 2024-01-02 PROCEDURE — 6360000002 HC RX W HCPCS: Performed by: EMERGENCY MEDICINE

## 2024-01-02 PROCEDURE — 2580000003 HC RX 258: Performed by: INTERNAL MEDICINE

## 2024-01-02 PROCEDURE — 87086 URINE CULTURE/COLONY COUNT: CPT

## 2024-01-02 PROCEDURE — 2580000003 HC RX 258: Performed by: EMERGENCY MEDICINE

## 2024-01-02 PROCEDURE — 87181 SC STD AGAR DILUTION PER AGT: CPT

## 2024-01-02 RX ORDER — ONDANSETRON 4 MG/1
4 TABLET, ORALLY DISINTEGRATING ORAL EVERY 8 HOURS PRN
Status: DISCONTINUED | OUTPATIENT
Start: 2024-01-02 | End: 2024-01-11 | Stop reason: HOSPADM

## 2024-01-02 RX ORDER — POTASSIUM CHLORIDE 20 MEQ/1
40 TABLET, EXTENDED RELEASE ORAL ONCE
Status: COMPLETED | OUTPATIENT
Start: 2024-01-02 | End: 2024-01-02

## 2024-01-02 RX ORDER — POLYETHYLENE GLYCOL 3350 17 G/17G
17 POWDER, FOR SOLUTION ORAL DAILY PRN
Status: DISCONTINUED | OUTPATIENT
Start: 2024-01-02 | End: 2024-01-10

## 2024-01-02 RX ORDER — LACTULOSE 10 G/15ML
30 SOLUTION ORAL 3 TIMES DAILY
Status: DISCONTINUED | OUTPATIENT
Start: 2024-01-02 | End: 2024-01-10

## 2024-01-02 RX ORDER — ONDANSETRON 2 MG/ML
4 INJECTION INTRAMUSCULAR; INTRAVENOUS EVERY 6 HOURS PRN
Status: DISCONTINUED | OUTPATIENT
Start: 2024-01-02 | End: 2024-01-11 | Stop reason: HOSPADM

## 2024-01-02 RX ORDER — ACETAMINOPHEN 325 MG/1
650 TABLET ORAL EVERY 6 HOURS PRN
Status: DISCONTINUED | OUTPATIENT
Start: 2024-01-02 | End: 2024-01-10

## 2024-01-02 RX ORDER — 0.9 % SODIUM CHLORIDE 0.9 %
1000 INTRAVENOUS SOLUTION INTRAVENOUS ONCE
Status: COMPLETED | OUTPATIENT
Start: 2024-01-02 | End: 2024-01-02

## 2024-01-02 RX ORDER — ONDANSETRON 2 MG/ML
4 INJECTION INTRAMUSCULAR; INTRAVENOUS ONCE
Status: COMPLETED | OUTPATIENT
Start: 2024-01-02 | End: 2024-01-02

## 2024-01-02 RX ORDER — DICYCLOMINE HYDROCHLORIDE 10 MG/ML
20 INJECTION INTRAMUSCULAR ONCE
Status: COMPLETED | OUTPATIENT
Start: 2024-01-02 | End: 2024-01-02

## 2024-01-02 RX ORDER — POTASSIUM CHLORIDE 7.45 MG/ML
10 INJECTION INTRAVENOUS PRN
Status: DISCONTINUED | OUTPATIENT
Start: 2024-01-02 | End: 2024-01-05 | Stop reason: SDUPTHER

## 2024-01-02 RX ORDER — GABAPENTIN 100 MG/1
100 CAPSULE ORAL NIGHTLY
Status: DISCONTINUED | OUTPATIENT
Start: 2024-01-02 | End: 2024-01-10

## 2024-01-02 RX ORDER — ENOXAPARIN SODIUM 100 MG/ML
40 INJECTION SUBCUTANEOUS DAILY
Status: DISCONTINUED | OUTPATIENT
Start: 2024-01-02 | End: 2024-01-11 | Stop reason: HOSPADM

## 2024-01-02 RX ORDER — MAGNESIUM SULFATE HEPTAHYDRATE 40 MG/ML
2000 INJECTION, SOLUTION INTRAVENOUS PRN
Status: DISCONTINUED | OUTPATIENT
Start: 2024-01-02 | End: 2024-01-11 | Stop reason: HOSPADM

## 2024-01-02 RX ORDER — SODIUM CHLORIDE 9 MG/ML
INJECTION, SOLUTION INTRAVENOUS CONTINUOUS
Status: ACTIVE | OUTPATIENT
Start: 2024-01-02 | End: 2024-01-04

## 2024-01-02 RX ORDER — MAGNESIUM SULFATE 1 G/100ML
1000 INJECTION INTRAVENOUS PRN
Status: DISCONTINUED | OUTPATIENT
Start: 2024-01-02 | End: 2024-01-11 | Stop reason: HOSPADM

## 2024-01-02 RX ORDER — METOCLOPRAMIDE HYDROCHLORIDE 5 MG/ML
10 INJECTION INTRAMUSCULAR; INTRAVENOUS EVERY 6 HOURS
Status: DISCONTINUED | OUTPATIENT
Start: 2024-01-02 | End: 2024-01-11 | Stop reason: HOSPADM

## 2024-01-02 RX ORDER — POTASSIUM CHLORIDE 20 MEQ/1
40 TABLET, EXTENDED RELEASE ORAL PRN
Status: DISCONTINUED | OUTPATIENT
Start: 2024-01-02 | End: 2024-01-10

## 2024-01-02 RX ORDER — ACETAMINOPHEN 650 MG/1
650 SUPPOSITORY RECTAL EVERY 6 HOURS PRN
Status: DISCONTINUED | OUTPATIENT
Start: 2024-01-02 | End: 2024-01-10

## 2024-01-02 RX ORDER — LANOLIN ALCOHOL/MO/W.PET/CERES
400 CREAM (GRAM) TOPICAL DAILY
Status: DISCONTINUED | OUTPATIENT
Start: 2024-01-03 | End: 2024-01-10

## 2024-01-02 RX ORDER — FERROUS SULFATE 325(65) MG
325 TABLET ORAL 2 TIMES DAILY
Status: DISCONTINUED | OUTPATIENT
Start: 2024-01-02 | End: 2024-01-10

## 2024-01-02 RX ORDER — SODIUM CHLORIDE 0.9 % (FLUSH) 0.9 %
5-40 SYRINGE (ML) INJECTION EVERY 12 HOURS SCHEDULED
Status: DISCONTINUED | OUTPATIENT
Start: 2024-01-02 | End: 2024-01-11 | Stop reason: HOSPADM

## 2024-01-02 RX ORDER — SODIUM CHLORIDE 0.9 % (FLUSH) 0.9 %
10 SYRINGE (ML) INJECTION PRN
Status: DISCONTINUED | OUTPATIENT
Start: 2024-01-02 | End: 2024-01-11 | Stop reason: HOSPADM

## 2024-01-02 RX ORDER — METOCLOPRAMIDE HYDROCHLORIDE 5 MG/ML
10 INJECTION INTRAMUSCULAR; INTRAVENOUS ONCE
Status: COMPLETED | OUTPATIENT
Start: 2024-01-02 | End: 2024-01-02

## 2024-01-02 RX ORDER — NADOLOL 20 MG/1
20 TABLET ORAL DAILY
Status: DISCONTINUED | OUTPATIENT
Start: 2024-01-03 | End: 2024-01-10

## 2024-01-02 RX ORDER — LORAZEPAM 1 MG/1
1 TABLET ORAL ONCE
Status: COMPLETED | OUTPATIENT
Start: 2024-01-02 | End: 2024-01-02

## 2024-01-02 RX ORDER — POTASSIUM CHLORIDE 7.45 MG/ML
10 INJECTION INTRAVENOUS PRN
Status: DISCONTINUED | OUTPATIENT
Start: 2024-01-02 | End: 2024-01-10

## 2024-01-02 RX ORDER — PANTOPRAZOLE SODIUM 40 MG/1
40 TABLET, DELAYED RELEASE ORAL DAILY
Status: DISCONTINUED | OUTPATIENT
Start: 2024-01-03 | End: 2024-01-10

## 2024-01-02 RX ORDER — SPIRONOLACTONE 25 MG/1
25 TABLET ORAL DAILY
Status: DISCONTINUED | OUTPATIENT
Start: 2024-01-03 | End: 2024-01-10

## 2024-01-02 RX ORDER — SODIUM CHLORIDE 9 MG/ML
INJECTION, SOLUTION INTRAVENOUS PRN
Status: DISCONTINUED | OUTPATIENT
Start: 2024-01-02 | End: 2024-01-11 | Stop reason: HOSPADM

## 2024-01-02 RX ORDER — POTASSIUM CHLORIDE 20 MEQ/1
40 TABLET, EXTENDED RELEASE ORAL PRN
Status: DISCONTINUED | OUTPATIENT
Start: 2024-01-02 | End: 2024-01-05 | Stop reason: SDUPTHER

## 2024-01-02 RX ORDER — FUROSEMIDE 20 MG/1
20 TABLET ORAL DAILY
Status: DISCONTINUED | OUTPATIENT
Start: 2024-01-03 | End: 2024-01-08

## 2024-01-02 RX ADMIN — DICYCLOMINE HYDROCHLORIDE 20 MG: 10 INJECTION, SOLUTION INTRAMUSCULAR at 13:25

## 2024-01-02 RX ADMIN — POTASSIUM CHLORIDE 40 MEQ: 1500 TABLET, EXTENDED RELEASE ORAL at 14:03

## 2024-01-02 RX ADMIN — ONDANSETRON 4 MG: 2 INJECTION INTRAMUSCULAR; INTRAVENOUS at 13:25

## 2024-01-02 RX ADMIN — METOCLOPRAMIDE 10 MG: 5 INJECTION, SOLUTION INTRAMUSCULAR; INTRAVENOUS at 14:04

## 2024-01-02 RX ADMIN — FERROUS SULFATE TAB 325 MG (65 MG ELEMENTAL FE) 325 MG: 325 (65 FE) TAB at 21:39

## 2024-01-02 RX ADMIN — SODIUM CHLORIDE: 9 INJECTION, SOLUTION INTRAVENOUS at 20:55

## 2024-01-02 RX ADMIN — SODIUM CHLORIDE 1000 ML: 9 INJECTION, SOLUTION INTRAVENOUS at 13:26

## 2024-01-02 RX ADMIN — GABAPENTIN 100 MG: 100 CAPSULE ORAL at 21:39

## 2024-01-02 RX ADMIN — ENOXAPARIN SODIUM 40 MG: 100 INJECTION SUBCUTANEOUS at 21:39

## 2024-01-02 RX ADMIN — LACTULOSE 30 G: 20 SOLUTION ORAL at 21:39

## 2024-01-02 RX ADMIN — LORAZEPAM 1 MG: 1 TABLET ORAL at 14:55

## 2024-01-02 ASSESSMENT — ENCOUNTER SYMPTOMS
WHEEZING: 0
EYE PAIN: 0
DIARRHEA: 0
COLOR CHANGE: 0
FACIAL SWELLING: 0
RHINORRHEA: 0
SINUS PRESSURE: 0
CONSTIPATION: 0
SHORTNESS OF BREATH: 0
NAUSEA: 1
TROUBLE SWALLOWING: 0
SORE THROAT: 0
BLOOD IN STOOL: 0
BACK PAIN: 0
CHEST TIGHTNESS: 0
ABDOMINAL PAIN: 1
EYE REDNESS: 0
COUGH: 1
VOMITING: 0
EYE DISCHARGE: 0

## 2024-01-02 ASSESSMENT — LIFESTYLE VARIABLES
HOW MANY STANDARD DRINKS CONTAINING ALCOHOL DO YOU HAVE ON A TYPICAL DAY: 3 OR 4
HOW OFTEN DO YOU HAVE A DRINK CONTAINING ALCOHOL: 2-3 TIMES A WEEK

## 2024-01-02 ASSESSMENT — PAIN SCALES - GENERAL
PAINLEVEL_OUTOF10: 8
PAINLEVEL_OUTOF10: 0

## 2024-01-02 ASSESSMENT — PAIN - FUNCTIONAL ASSESSMENT: PAIN_FUNCTIONAL_ASSESSMENT: 0-10

## 2024-01-02 NOTE — ED NOTES
RN attempted to walk patient to restroom. Pt tremors increased and patient felt unstable. Dr. Rubalcava notified.

## 2024-01-02 NOTE — H&P
[facility] Internal Medicine  Marcelo Sutton MD; Parmjit Parry MD; Jaziel Cerda MD; MD Jessika Diego MD; Alexia Tobar MD    [facility] Internal Medicine   IN-PATIENT SERVICE   [facility]     HISTORY AND PHYSICAL EXAMINATION            Date:   1/2/2024  Patient name:  Frank Lisa  Date of admission:  1/2/2024 12:40 PM  MRN:   [id number]  Account:  [id number]  YOB: 1960  PCP:    Lopez Rosario PA  Room:   [place] [address]  Code Status:    Prior      Chief Complaint:     Persistent nausea intractable with generalized weakness and falls    History Obtained From:     Patient medical record nursing    History of Present Illness:     63-year-old gentleman with a history of alcohol abuse history of alcoholic cirrhosis history of Lezama's esophagus and bleeding gastric varices active drinker on daily basis patient claims his last drink was Saturday since then he has been having nausea and vomiting initially now persistent intractable nausea unable to eat or drink at risk for dehydration lives alone with his dog was too weak to walk feed himself or hydrate himself  Patient noted to have hypokalemia potassium 3.3 with a decreased BUN at 6 low calcium at 7.8 low protein suggesting mild protein calorie malnutrition  Patient albumin is down to 2.4  Noted to be normocytic anemia hemoglobin 11.8 with thrombocytopenia 72,000 drop from 145,000 last November      Past Medical History:     Past Medical History:   Diagnosis Date    Abnormal EKG     Acute deep vein thrombosis (DVT) of brachial vein of right upper extremity (HCC) 01/07/2023    Also- Superficial venous thrombosis of the cephalic vein in the forearm    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 Mc, orthopedic, every 3-4 months, gets steroid injection    Teja

## 2024-01-02 NOTE — ED PROVIDER NOTES
Previous Medications    ACETAMINOPHEN (TYLENOL) 325 MG TABLET    Take 2 tablets by mouth every 6 hours as needed for Pain    FEROSUL 325 (65 FE) MG TABLET    take 1 tablet by mouth twice a day    FUROSEMIDE (LASIX) 40 MG TABLET    Take 0.5 tablets by mouth daily    GABAPENTIN (NEURONTIN) 100 MG CAPSULE    Take 1 capsule by mouth nightly for 30 days. Intended supply: 90 days    LACTULOSE (CHRONULAC) 10 GM/15ML SOLUTION    take 30 milliliters by mouth three times a day    MAGNESIUM OXIDE (MAG-OX) 250 MG TABS TABLET    take 1 tablet by mouth daily    NADOLOL (CORGARD) 20 MG TABLET    Take 1 tablet by mouth daily    ONDANSETRON (ZOFRAN) 4 MG TABLET    Take 1 tablet by mouth 3 times daily as needed for Nausea or Vomiting    ONDANSETRON (ZOFRAN-ODT) 4 MG DISINTEGRATING TABLET    dissolve 1 tablet by mouth every 8 hours if needed for nausea and vomiting    PANTOPRAZOLE (PROTONIX) 40 MG TABLET    Take 1 tablet by mouth daily    SPIRONOLACTONE (ALDACTONE) 25 MG TABLET    Take 1 tablet by mouth daily       ALLERGIES     is allergic to pollen extract.    SOCIAL HISTORY      reports that he quit smoking about 6 years ago. His smoking use included cigarettes. He started smoking about 51 years ago. He has a 45.0 pack-year smoking history. He has never used smokeless tobacco. He reports that he does not currently use alcohol.  Frequency: 1.00 time per week. Drug: Marijuana (Weed).    PHYSICAL EXAM     INITIAL VITALS: BP (!) 159/88   Pulse 71   Temp 97.8 °F (36.6 °C) (Oral)   Resp 16   SpO2 95%      Physical Exam  Vitals and nursing note reviewed.   Constitutional:       General: He is not in acute distress.     Appearance: He is well-developed. He is not diaphoretic.   HENT:      Head: Normocephalic and atraumatic.   Eyes:      General: No scleral icterus.        Right eye: No discharge.         Left eye: No discharge.      Conjunctiva/sclera: Conjunctivae normal.      Pupils: Pupils are equal, round, and reactive to

## 2024-01-03 ENCOUNTER — APPOINTMENT (OUTPATIENT)
Dept: CT IMAGING | Age: 64
DRG: 897 | End: 2024-01-03
Payer: COMMERCIAL

## 2024-01-03 LAB
ALBUMIN SERPL-MCNC: 2.2 G/DL (ref 3.5–5.2)
ALP SERPL-CCNC: 390 U/L (ref 40–129)
ALT SERPL-CCNC: 22 U/L (ref 5–41)
ANION GAP SERPL CALCULATED.3IONS-SCNC: 5 MMOL/L (ref 9–17)
AST SERPL-CCNC: 104 U/L
BILIRUB DIRECT SERPL-MCNC: 2.1 MG/DL
BILIRUB INDIRECT SERPL-MCNC: 2.5 MG/DL (ref 0–1)
BILIRUB SERPL-MCNC: 4.6 MG/DL (ref 0.3–1.2)
BUN SERPL-MCNC: 10 MG/DL (ref 8–23)
CALCIUM SERPL-MCNC: 7.6 MG/DL (ref 8.6–10.4)
CHLORIDE SERPL-SCNC: 102 MMOL/L (ref 98–107)
CO2 SERPL-SCNC: 29 MMOL/L (ref 20–31)
CREAT SERPL-MCNC: 0.9 MG/DL (ref 0.7–1.2)
GFR SERPL CREATININE-BSD FRML MDRD: >60 ML/MIN/1.73M2
GLUCOSE SERPL-MCNC: 95 MG/DL (ref 70–99)
INR PPP: 1.4
POTASSIUM SERPL-SCNC: 3.4 MMOL/L (ref 3.7–5.3)
PROT SERPL-MCNC: 5.2 G/DL (ref 6.4–8.3)
PROTHROMBIN TIME: 17.9 SEC (ref 11.8–14.6)
SODIUM SERPL-SCNC: 136 MMOL/L (ref 135–144)

## 2024-01-03 PROCEDURE — 80048 BASIC METABOLIC PNL TOTAL CA: CPT

## 2024-01-03 PROCEDURE — 96366 THER/PROPH/DIAG IV INF ADDON: CPT

## 2024-01-03 PROCEDURE — G0378 HOSPITAL OBSERVATION PER HR: HCPCS

## 2024-01-03 PROCEDURE — 85610 PROTHROMBIN TIME: CPT

## 2024-01-03 PROCEDURE — 1200000000 HC SEMI PRIVATE

## 2024-01-03 PROCEDURE — 2500000003 HC RX 250 WO HCPCS: Performed by: INTERNAL MEDICINE

## 2024-01-03 PROCEDURE — 6360000002 HC RX W HCPCS: Performed by: INTERNAL MEDICINE

## 2024-01-03 PROCEDURE — 80076 HEPATIC FUNCTION PANEL: CPT

## 2024-01-03 PROCEDURE — 36415 COLL VENOUS BLD VENIPUNCTURE: CPT

## 2024-01-03 PROCEDURE — 6370000000 HC RX 637 (ALT 250 FOR IP): Performed by: INTERNAL MEDICINE

## 2024-01-03 PROCEDURE — 74176 CT ABD & PELVIS W/O CONTRAST: CPT

## 2024-01-03 PROCEDURE — 6370000000 HC RX 637 (ALT 250 FOR IP): Performed by: NURSE PRACTITIONER

## 2024-01-03 PROCEDURE — 99232 SBSQ HOSP IP/OBS MODERATE 35: CPT | Performed by: INTERNAL MEDICINE

## 2024-01-03 PROCEDURE — 96372 THER/PROPH/DIAG INJ SC/IM: CPT

## 2024-01-03 PROCEDURE — 2580000003 HC RX 258: Performed by: INTERNAL MEDICINE

## 2024-01-03 PROCEDURE — 96367 TX/PROPH/DG ADDL SEQ IV INF: CPT

## 2024-01-03 RX ADMIN — ACETAMINOPHEN 650 MG: 325 TABLET ORAL at 11:56

## 2024-01-03 RX ADMIN — FERROUS SULFATE TAB 325 MG (65 MG ELEMENTAL FE) 325 MG: 325 (65 FE) TAB at 10:33

## 2024-01-03 RX ADMIN — NADOLOL 20 MG: 20 TABLET ORAL at 10:46

## 2024-01-03 RX ADMIN — Medication 400 MG: at 10:33

## 2024-01-03 RX ADMIN — FERROUS SULFATE TAB 325 MG (65 MG ELEMENTAL FE) 325 MG: 325 (65 FE) TAB at 20:54

## 2024-01-03 RX ADMIN — ENOXAPARIN SODIUM 40 MG: 100 INJECTION SUBCUTANEOUS at 10:32

## 2024-01-03 RX ADMIN — LACTULOSE 30 G: 20 SOLUTION ORAL at 10:45

## 2024-01-03 RX ADMIN — GABAPENTIN 100 MG: 100 CAPSULE ORAL at 20:54

## 2024-01-03 RX ADMIN — METOCLOPRAMIDE 10 MG: 5 INJECTION, SOLUTION INTRAMUSCULAR; INTRAVENOUS at 06:12

## 2024-01-03 RX ADMIN — METOCLOPRAMIDE 10 MG: 5 INJECTION, SOLUTION INTRAMUSCULAR; INTRAVENOUS at 11:57

## 2024-01-03 RX ADMIN — CEFTRIAXONE SODIUM 1000 MG: 1 INJECTION, POWDER, FOR SOLUTION INTRAMUSCULAR; INTRAVENOUS at 12:04

## 2024-01-03 RX ADMIN — ONDANSETRON 4 MG: 4 TABLET, ORALLY DISINTEGRATING ORAL at 21:01

## 2024-01-03 RX ADMIN — PANTOPRAZOLE SODIUM 40 MG: 40 TABLET, DELAYED RELEASE ORAL at 10:33

## 2024-01-03 RX ADMIN — SODIUM CHLORIDE: 9 INJECTION, SOLUTION INTRAVENOUS at 09:46

## 2024-01-03 RX ADMIN — METOCLOPRAMIDE 10 MG: 5 INJECTION, SOLUTION INTRAMUSCULAR; INTRAVENOUS at 00:41

## 2024-01-03 RX ADMIN — BARIUM SULFATE 450 ML: 20 SUSPENSION ORAL at 12:06

## 2024-01-03 RX ADMIN — ONDANSETRON 4 MG: 4 TABLET, ORALLY DISINTEGRATING ORAL at 10:41

## 2024-01-03 RX ADMIN — METOCLOPRAMIDE 10 MG: 5 INJECTION, SOLUTION INTRAMUSCULAR; INTRAVENOUS at 18:13

## 2024-01-03 RX ADMIN — POTASSIUM CHLORIDE 40 MEQ: 1500 TABLET, EXTENDED RELEASE ORAL at 14:06

## 2024-01-03 RX ADMIN — SODIUM CHLORIDE, PRESERVATIVE FREE 10 ML: 5 INJECTION INTRAVENOUS at 10:48

## 2024-01-03 RX ADMIN — LACTULOSE 30 G: 20 SOLUTION ORAL at 14:06

## 2024-01-03 ASSESSMENT — PAIN DESCRIPTION - DESCRIPTORS: DESCRIPTORS: ACHING

## 2024-01-03 ASSESSMENT — PAIN SCALES - GENERAL
PAINLEVEL_OUTOF10: 8
PAINLEVEL_OUTOF10: 0

## 2024-01-03 ASSESSMENT — PAIN DESCRIPTION - LOCATION: LOCATION: HEAD

## 2024-01-03 NOTE — CARE COORDINATION
Case Management Assessment  Initial Evaluation    Date/Time of Evaluation: 1/3/2024 2:51 PM  Assessment Completed by: Sharee Snyder RN    If patient is discharged prior to next notation, then this note serves as note for discharge by case management.    Patient Name: Frank Lisa                   YOB: 1960  Diagnosis: Nausea [R11.0]  General weakness [R53.1]  Intractable nausea and vomiting [R11.2]                   Date / Time: 1/2/2024 12:40 PM    Patient Admission Status: Inpatient   Readmission Risk (Low < 19, Mod (19-27), High > 27): Readmission Risk Score: 30.8    Current PCP: Lopez Rosario PA  PCP verified by CM? Yes    Chart Reviewed: Yes      History Provided by: Patient  Patient Orientation: Alert and Oriented    Patient Cognition: Alert    Hospitalization in the last 30 days (Readmission):  No    If yes, Readmission Assessment in CM Navigator will be completed.    Advance Directives:      Code Status: Full Code   Patient's Primary Decision Maker is: Legal Next of Kin    Primary Decision Maker: Nilam Lisa - Other - [phone]    Discharge Planning:    Patient lives with: Alone Type of Home: House  Primary Care Giver: Self  Patient Support Systems include: Spouse/Significant Other   Current Financial resources: Medicare  Current community resources: ECF/Home Care  Current services prior to admission: Home Care, Durable Medical Equipment            Current DME: Cane, Walker            Type of Home Care services:  OT, PT, Nursing Services    ADLS  Prior functional level: Independent in ADLs/IADLs  Current functional level: Independent in ADLs/IADLs    PT AM-PAC:   /24  OT AM-PAC:   /24    Family can provide assistance at DC: No  Would you like Case Management to discuss the discharge plan with any other family members/significant others, and if so, who? No  Plans to Return to Present Housing: Yes  Other Identified Issues/Barriers to RETURNING to current housing:

## 2024-01-03 NOTE — DISCHARGE INSTR - COC
Continuity of Care Form    Patient Name: Frank Lisa   :  1960  MRN:  [id number]    Admit date:  2024  Discharge date:  24    Code Status Order: Full Code   Advance Directives:     Admitting Physician:  Jaziel Cerda MD  PCP: Lopez Rosario PA    Discharging Nurse: Sabiha RN  Discharging Hospital Unit/Room#: 3/3-01  Discharging Unit Phone Number: [phone]    Emergency Contact:   Extended Emergency Contact Information  Primary Emergency Contact: Nilam Lisa  Home Phone: [phone]  Work Phone: [phone]  Mobile Phone: [phone]  Relation: Other    Past Surgical History:  Past Surgical History:   Procedure Laterality Date    BUNIONECTOMY      twice on right side    BUNIONECTOMY Left     CARPAL TUNNEL RELEASE Right     COLON SURGERY  10/10/2023    EXPLORATORY LAPAROTOMY, REVERSAL ILEOSTOMY WITH ILEOCOLOSTOMY, LYSIS OF ADHESIONS,    COLONOSCOPY      at age 39    COLONOSCOPY  10/05/2016    polyps-pathology tubular adenoma, and abnormal looking mucosa right colon-pathology-tubular adenoma    COLONOSCOPY N/A 2018    COLONOSCOPY POLYPECTOMY COLD BIOPSY performed by Augusto Cordova MD at [facility] OR    COLONOSCOPY  2018    Small polyp in the sigmoid colon and excised with biopsy forceps--tubular adenoma    COLONOSCOPY N/A 2022    COLONOSCOPY POLYPECTOMY performed by Augusto Cordova MD at [facility] OR    ENDOSCOPY, COLON, DIAGNOSTIC      EGD    ESOPHAGOGASTRODUODENOSCOPY  2022    ESOPHAGOGASTRODUODENOSCOPY N/A 2023    IR PORT PLACEMENT EQUAL OR GREATER THAN 5 YEARS  2021    IR PORT PLACEMENT EQUAL OR GREATER THAN 5 YEARS 2021 [facility] SPECIAL PROCEDURES    IR TIPS INSERTION  2022    IR TIPS INSERTION 2022 Scar VELEZ MD [facility] SPECIAL PROCEDURES    KNEE SURGERY Left     cyst removed    LAPAROTOMY N/A 2023    LAPAROTOMY EXPLORATORY, RIGHT COLECTOMY, CREATION IF END ILEOSTOMY performed by Kvng Waddell DO at [facility] OR

## 2024-01-03 NOTE — ED NOTES
Report given to JERI Cole from [employer].   Report method by phone   The following was reviewed with receiving RN:   Current vital signs:  /87   Pulse 79   Temp 97.8 °F (36.6 °C) (Oral)   Resp 14   SpO2 98%                      Any medication or safety alerts were reviewed. Any pending diagnostics and notifications were also reviewed, as well as any safety concerns or issues, abnormal labs, abnormal imaging, and abnormal assessment findings. Questions were answered.

## 2024-01-04 PROBLEM — R10.31 RIGHT LOWER QUADRANT ABDOMINAL PAIN: Status: ACTIVE | Noted: 2024-01-04

## 2024-01-04 PROBLEM — R11.0 NAUSEA: Status: ACTIVE | Noted: 2024-01-04

## 2024-01-04 PROBLEM — R97.0 ELEVATED CEA: Status: ACTIVE | Noted: 2024-01-04

## 2024-01-04 PROBLEM — R93.5 ABNORMAL ABDOMINAL CT SCAN: Status: ACTIVE | Noted: 2024-01-04

## 2024-01-04 PROBLEM — R53.1 GENERAL WEAKNESS: Status: ACTIVE | Noted: 2024-01-04

## 2024-01-04 LAB
AFP SERPL-MCNC: 4.8 UG/L
ALBUMIN SERPL-MCNC: 2.3 G/DL (ref 3.5–5.2)
ALP SERPL-CCNC: 360 U/L (ref 40–129)
ALT SERPL-CCNC: 19 U/L (ref 5–41)
AMMONIA PLAS-SCNC: 42 UMOL/L (ref 16–60)
ANION GAP SERPL CALCULATED.3IONS-SCNC: 10 MMOL/L (ref 9–17)
AST SERPL-CCNC: 83 U/L
BASOPHILS # BLD: 0 K/UL (ref 0–0.2)
BASOPHILS NFR BLD: 0 % (ref 0–2)
BILIRUB SERPL-MCNC: 3.2 MG/DL (ref 0.3–1.2)
BUN SERPL-MCNC: 9 MG/DL (ref 8–23)
CALCIUM SERPL-MCNC: 7.6 MG/DL (ref 8.6–10.4)
CHLORIDE SERPL-SCNC: 104 MMOL/L (ref 98–107)
CO2 SERPL-SCNC: 22 MMOL/L (ref 20–31)
CREAT SERPL-MCNC: 0.9 MG/DL (ref 0.7–1.2)
EOSINOPHIL # BLD: 0 K/UL (ref 0–0.4)
EOSINOPHILS RELATIVE PERCENT: 0 % (ref 0–4)
ERYTHROCYTE [DISTWIDTH] IN BLOOD BY AUTOMATED COUNT: 14.2 % (ref 11.5–14.9)
GFR SERPL CREATININE-BSD FRML MDRD: >60 ML/MIN/1.73M2
GLUCOSE SERPL-MCNC: 123 MG/DL (ref 70–99)
HAV IGM SERPL QL IA: NONREACTIVE
HBV CORE IGM SERPL QL IA: NONREACTIVE
HBV SURFACE AG SERPL QL IA: NONREACTIVE
HCT VFR BLD AUTO: 31.3 % (ref 41–53)
HCV AB SERPL QL IA: REACTIVE
HGB BLD-MCNC: 10.4 G/DL (ref 13.5–17.5)
LACTATE BLDV-SCNC: 2.6 MMOL/L (ref 0.5–2.2)
LYMPHOCYTES NFR BLD: 0.67 K/UL (ref 1–4.8)
LYMPHOCYTES RELATIVE PERCENT: 6 % (ref 24–44)
MAGNESIUM SERPL-MCNC: 1 MG/DL (ref 1.6–2.6)
MCH RBC QN AUTO: 32.3 PG (ref 26–34)
MCHC RBC AUTO-ENTMCNC: 33.1 G/DL (ref 31–37)
MCV RBC AUTO: 97.5 FL (ref 80–100)
MONOCYTES NFR BLD: 1.22 K/UL (ref 0.1–1.3)
MONOCYTES NFR BLD: 11 % (ref 1–7)
MORPHOLOGY: NORMAL
NEUTROPHILS NFR BLD: 83 % (ref 36–66)
NEUTS SEG NFR BLD: 9.21 K/UL (ref 1.3–9.1)
PLATELET # BLD AUTO: 82 K/UL (ref 150–450)
PMV BLD AUTO: 8 FL (ref 6–12)
POTASSIUM SERPL-SCNC: 3.5 MMOL/L (ref 3.7–5.3)
PROT SERPL-MCNC: 5.5 G/DL (ref 6.4–8.3)
RBC # BLD AUTO: 3.21 M/UL (ref 4.5–5.9)
SODIUM SERPL-SCNC: 136 MMOL/L (ref 135–144)
WBC OTHER # BLD: 11.1 K/UL (ref 3.5–11)

## 2024-01-04 PROCEDURE — 82105 ALPHA-FETOPROTEIN SERUM: CPT

## 2024-01-04 PROCEDURE — 96376 TX/PRO/DX INJ SAME DRUG ADON: CPT

## 2024-01-04 PROCEDURE — 83735 ASSAY OF MAGNESIUM: CPT

## 2024-01-04 PROCEDURE — 6370000000 HC RX 637 (ALT 250 FOR IP): Performed by: INTERNAL MEDICINE

## 2024-01-04 PROCEDURE — 6370000000 HC RX 637 (ALT 250 FOR IP): Performed by: NURSE PRACTITIONER

## 2024-01-04 PROCEDURE — 97530 THERAPEUTIC ACTIVITIES: CPT

## 2024-01-04 PROCEDURE — 80074 ACUTE HEPATITIS PANEL: CPT

## 2024-01-04 PROCEDURE — 96372 THER/PROPH/DIAG INJ SC/IM: CPT

## 2024-01-04 PROCEDURE — 6360000002 HC RX W HCPCS: Performed by: NURSE PRACTITIONER

## 2024-01-04 PROCEDURE — 99223 1ST HOSP IP/OBS HIGH 75: CPT | Performed by: NURSE PRACTITIONER

## 2024-01-04 PROCEDURE — 6360000002 HC RX W HCPCS: Performed by: INTERNAL MEDICINE

## 2024-01-04 PROCEDURE — 82140 ASSAY OF AMMONIA: CPT

## 2024-01-04 PROCEDURE — G0378 HOSPITAL OBSERVATION PER HR: HCPCS

## 2024-01-04 PROCEDURE — 83605 ASSAY OF LACTIC ACID: CPT

## 2024-01-04 PROCEDURE — 97166 OT EVAL MOD COMPLEX 45 MIN: CPT

## 2024-01-04 PROCEDURE — 97162 PT EVAL MOD COMPLEX 30 MIN: CPT

## 2024-01-04 PROCEDURE — 97116 GAIT TRAINING THERAPY: CPT

## 2024-01-04 PROCEDURE — 1200000000 HC SEMI PRIVATE

## 2024-01-04 PROCEDURE — 85025 COMPLETE CBC W/AUTO DIFF WBC: CPT

## 2024-01-04 PROCEDURE — 99222 1ST HOSP IP/OBS MODERATE 55: CPT | Performed by: INTERNAL MEDICINE

## 2024-01-04 PROCEDURE — 96366 THER/PROPH/DIAG IV INF ADDON: CPT

## 2024-01-04 PROCEDURE — 99233 SBSQ HOSP IP/OBS HIGH 50: CPT | Performed by: INTERNAL MEDICINE

## 2024-01-04 PROCEDURE — 2580000003 HC RX 258: Performed by: INTERNAL MEDICINE

## 2024-01-04 PROCEDURE — 80053 COMPREHEN METABOLIC PANEL: CPT

## 2024-01-04 PROCEDURE — 6370000000 HC RX 637 (ALT 250 FOR IP): Performed by: SURGERY

## 2024-01-04 PROCEDURE — 36415 COLL VENOUS BLD VENIPUNCTURE: CPT

## 2024-01-04 RX ORDER — CHLORDIAZEPOXIDE HYDROCHLORIDE 25 MG/1
25 CAPSULE, GELATIN COATED ORAL 3 TIMES DAILY
Status: DISCONTINUED | OUTPATIENT
Start: 2024-01-04 | End: 2024-01-06

## 2024-01-04 RX ORDER — M-VIT,TX,IRON,MINS/CALC/FOLIC 27MG-0.4MG
1 TABLET ORAL DAILY
Status: DISCONTINUED | OUTPATIENT
Start: 2024-01-04 | End: 2024-01-10

## 2024-01-04 RX ORDER — SUCRALFATE 1 G/1
1 TABLET ORAL EVERY 8 HOURS SCHEDULED
Status: DISCONTINUED | OUTPATIENT
Start: 2024-01-04 | End: 2024-01-11 | Stop reason: HOSPADM

## 2024-01-04 RX ORDER — LORAZEPAM 2 MG/ML
4 INJECTION INTRAMUSCULAR
Status: DISCONTINUED | OUTPATIENT
Start: 2024-01-04 | End: 2024-01-05

## 2024-01-04 RX ORDER — LORAZEPAM 2 MG/ML
1 INJECTION INTRAMUSCULAR
Status: DISCONTINUED | OUTPATIENT
Start: 2024-01-04 | End: 2024-01-05

## 2024-01-04 RX ORDER — LORAZEPAM 2 MG/ML
3 INJECTION INTRAMUSCULAR
Status: DISCONTINUED | OUTPATIENT
Start: 2024-01-04 | End: 2024-01-05

## 2024-01-04 RX ORDER — LORAZEPAM 1 MG/1
3 TABLET ORAL
Status: DISCONTINUED | OUTPATIENT
Start: 2024-01-04 | End: 2024-01-05

## 2024-01-04 RX ORDER — LORAZEPAM 1 MG/1
1 TABLET ORAL
Status: DISCONTINUED | OUTPATIENT
Start: 2024-01-04 | End: 2024-01-05

## 2024-01-04 RX ORDER — GAUZE BANDAGE 2" X 2"
100 BANDAGE TOPICAL DAILY
Status: DISCONTINUED | OUTPATIENT
Start: 2024-01-04 | End: 2024-01-10

## 2024-01-04 RX ORDER — LORAZEPAM 1 MG/1
2 TABLET ORAL
Status: DISCONTINUED | OUTPATIENT
Start: 2024-01-04 | End: 2024-01-05

## 2024-01-04 RX ORDER — LORAZEPAM 2 MG/ML
2 INJECTION INTRAMUSCULAR
Status: DISCONTINUED | OUTPATIENT
Start: 2024-01-04 | End: 2024-01-05

## 2024-01-04 RX ORDER — LORAZEPAM 1 MG/1
4 TABLET ORAL
Status: DISCONTINUED | OUTPATIENT
Start: 2024-01-04 | End: 2024-01-05

## 2024-01-04 RX ADMIN — Medication 2 MG: at 02:31

## 2024-01-04 RX ADMIN — POTASSIUM CHLORIDE 40 MEQ: 1500 TABLET, EXTENDED RELEASE ORAL at 19:19

## 2024-01-04 RX ADMIN — ENOXAPARIN SODIUM 40 MG: 100 INJECTION SUBCUTANEOUS at 08:40

## 2024-01-04 RX ADMIN — CHLORDIAZEPOXIDE HYDROCHLORIDE 25 MG: 25 CAPSULE ORAL at 08:38

## 2024-01-04 RX ADMIN — FERROUS SULFATE TAB 325 MG (65 MG ELEMENTAL FE) 325 MG: 325 (65 FE) TAB at 20:24

## 2024-01-04 RX ADMIN — GABAPENTIN 100 MG: 100 CAPSULE ORAL at 20:24

## 2024-01-04 RX ADMIN — LACTULOSE 30 G: 20 SOLUTION ORAL at 16:20

## 2024-01-04 RX ADMIN — ACETAMINOPHEN 650 MG: 325 TABLET ORAL at 08:39

## 2024-01-04 RX ADMIN — NADOLOL 20 MG: 20 TABLET ORAL at 08:38

## 2024-01-04 RX ADMIN — ONDANSETRON 4 MG: 2 INJECTION INTRAMUSCULAR; INTRAVENOUS at 08:47

## 2024-01-04 RX ADMIN — RIFAXIMIN 400 MG: 200 TABLET ORAL at 20:24

## 2024-01-04 RX ADMIN — METOCLOPRAMIDE 10 MG: 5 INJECTION, SOLUTION INTRAMUSCULAR; INTRAVENOUS at 11:39

## 2024-01-04 RX ADMIN — METOCLOPRAMIDE 10 MG: 5 INJECTION, SOLUTION INTRAMUSCULAR; INTRAVENOUS at 06:37

## 2024-01-04 RX ADMIN — ONDANSETRON 4 MG: 2 INJECTION INTRAMUSCULAR; INTRAVENOUS at 04:59

## 2024-01-04 RX ADMIN — Medication 1 TABLET: at 11:38

## 2024-01-04 RX ADMIN — SPIRONOLACTONE 25 MG: 25 TABLET ORAL at 11:38

## 2024-01-04 RX ADMIN — Medication 400 MG: at 08:38

## 2024-01-04 RX ADMIN — SODIUM CHLORIDE: 9 INJECTION, SOLUTION INTRAVENOUS at 13:27

## 2024-01-04 RX ADMIN — FUROSEMIDE 20 MG: 20 TABLET ORAL at 11:38

## 2024-01-04 RX ADMIN — METOCLOPRAMIDE 10 MG: 5 INJECTION, SOLUTION INTRAMUSCULAR; INTRAVENOUS at 18:29

## 2024-01-04 RX ADMIN — ONDANSETRON 4 MG: 2 INJECTION INTRAMUSCULAR; INTRAVENOUS at 20:30

## 2024-01-04 RX ADMIN — LACTULOSE 30 G: 20 SOLUTION ORAL at 08:38

## 2024-01-04 RX ADMIN — SUCRALFATE 1 G: 1 TABLET ORAL at 16:20

## 2024-01-04 RX ADMIN — CHLORDIAZEPOXIDE HYDROCHLORIDE 25 MG: 25 CAPSULE ORAL at 20:24

## 2024-01-04 RX ADMIN — PANTOPRAZOLE SODIUM 40 MG: 40 TABLET, DELAYED RELEASE ORAL at 08:38

## 2024-01-04 RX ADMIN — LACTULOSE 30 G: 20 SOLUTION ORAL at 20:23

## 2024-01-04 RX ADMIN — THIAMINE HCL TAB 100 MG 100 MG: 100 TAB at 08:38

## 2024-01-04 RX ADMIN — FERROUS SULFATE TAB 325 MG (65 MG ELEMENTAL FE) 325 MG: 325 (65 FE) TAB at 08:38

## 2024-01-04 RX ADMIN — RIFAXIMIN 400 MG: 200 TABLET ORAL at 16:21

## 2024-01-04 RX ADMIN — CEFTRIAXONE SODIUM 1000 MG: 1 INJECTION, POWDER, FOR SOLUTION INTRAMUSCULAR; INTRAVENOUS at 11:46

## 2024-01-04 RX ADMIN — CHLORDIAZEPOXIDE HYDROCHLORIDE 25 MG: 25 CAPSULE ORAL at 16:20

## 2024-01-04 RX ADMIN — METOCLOPRAMIDE 10 MG: 5 INJECTION, SOLUTION INTRAMUSCULAR; INTRAVENOUS at 00:28

## 2024-01-04 RX ADMIN — SUCRALFATE 1 G: 1 TABLET ORAL at 20:24

## 2024-01-04 ASSESSMENT — ENCOUNTER SYMPTOMS
CONSTIPATION: 0
EYE DISCHARGE: 0
APNEA: 0
CHOKING: 0
SHORTNESS OF BREATH: 0
PHOTOPHOBIA: 0
VOMITING: 1
EYE PAIN: 0
ABDOMINAL PAIN: 0
ABDOMINAL DISTENTION: 0
COLOR CHANGE: 0

## 2024-01-04 ASSESSMENT — PAIN SCALES - GENERAL: PAINLEVEL_OUTOF10: 0

## 2024-01-04 ASSESSMENT — PAIN DESCRIPTION - LOCATION: LOCATION: HEAD

## 2024-01-04 ASSESSMENT — PAIN DESCRIPTION - DESCRIPTORS: DESCRIPTORS: ACHING

## 2024-01-04 NOTE — CONSULTS
[facility]    GASTROENTEROLOGY CONSULT    Patient:   Frank Lisa   :    1960   Facility:   [facility]  Date:    2024  Admission Dx:  Nausea [R11.0]  General weakness [R53.1]  Intractable nausea and vomiting [R11.2]  Requesting physician: Jaziel Cerda MD  Reason for consult:  Abdominal pain       SUBJECTIVE:  History of Present Illness:  This is a 63 y.o.   male who was admitted 2024 with Nausea [R11.0]  General weakness [R53.1]  Intractable nausea and vomiting [R11.2]. We have been asked to see the patient in consultation by Jaziel Cerda MD for  abdominal pain. Pt has hx of of cirrhosis secondary to etoh abuse, (pt is still drinking)  iron deficiency anemia hepatitis c treated with harvoni GERD esophageal cancer (treated with chemo/radiation), esohpageal varices , he has hx of bowel perforation requiring right hemicolectomy with end ileostomy that was reversed by dr cobos on 10/10/23 htn who presented to the ED for abdominal pain.  He reports last alcohol use was on  initially  had nausea and emesis and was weak and unable to walk or eat. Reports RLQ pain with nausea since 2023. Pain and nausea are not increased with eating, has been using norco with little improvement. Denies dyspepsia States he is having non bloody bm's with lactulose. No recent afp. he denies fevers chills weight loss dysphagia change in the bowel habits melena hematemesis hematochezia.       ENDOSCOPY 23 egd ([facility]) The esophagus was noted to have at least 2 columns of large esophageal varices that flattened on insufflation. No high risk features noted.   There was evidence of previously placed clip right below the GE junction. Mild mucosal oozing of blood was noted around the site. 2 clips were attempted, 1 was unsuccessful. The second was placed adjacent to the first clip to try to stop the feeding vessel underneath and was successful. The area was 
Brother         MULTIPLE       REVIEW OF SYSTEMS:    Review of Systems   Constitutional:  Negative for activity change, chills and fever.   HENT:  Negative for congestion, drooling and ear pain.    Eyes:  Negative for photophobia, pain and discharge.   Respiratory:  Negative for apnea, choking and shortness of breath.    Cardiovascular:  Negative for chest pain, palpitations and leg swelling.   Gastrointestinal:  Positive for vomiting (As per HPI after alcohol use). Negative for abdominal distention, abdominal pain and constipation.   Endocrine: Negative for cold intolerance, polydipsia and polyuria.   Genitourinary:  Negative for difficulty urinating, enuresis and genital sores.   Musculoskeletal:  Negative for arthralgias, joint swelling and neck pain.   Skin:  Negative for color change and pallor.   Allergic/Immunologic: Negative for environmental allergies and food allergies.   Neurological:  Negative for dizziness, numbness and headaches.   Hematological:  Negative for adenopathy. Does not bruise/bleed easily.   Psychiatric/Behavioral:  Negative for agitation, confusion and hallucinations.            PHYSICAL EXAM:    VITALS:  /67   Pulse (!) 114   Temp 98.4 °F (36.9 °C)   Resp 18   Ht 1.778 m (5' 10\")   Wt 80.9 kg (178 lb 5.6 oz)   SpO2 93%   BMI 25.59 kg/m²   INTAKE/OUTPUT: .  Intake/Output Summary (Last 24 hours) at 1/4/2024 1453  Last data filed at 1/4/2024 0828  Gross per 24 hour   Intake 720 ml   Output --   Net 720 ml       Physical Exam  Vitals and nursing note reviewed.   Constitutional:       General: He is not in acute distress.     Appearance: Normal appearance. He is normal weight.   HENT:      Head: Normocephalic and atraumatic.      Right Ear: Tympanic membrane, ear canal and external ear normal.      Left Ear: Tympanic membrane, ear canal and external ear normal.      Nose: Nose normal.      Mouth/Throat:      Mouth: Mucous membranes are moist.      Pharynx: Oropharynx is clear. No

## 2024-01-04 NOTE — CARE COORDINATION
ONGOING DISCHARGE PLAN:    Patient is alert and oriented x4.    Spoke with patient regarding discharge plan and patient confirms that plan is still home with [facility].  Declined ETOH abuse resources.    Active order for IV Rocephin.    Will continue to follow for additional discharge needs.    If patient is discharged prior to next notation, then this note serves as note for discharge by case management.    Electronically signed by Leonila Chapa RN on 1/4/2024 at 2:02 PM

## 2024-01-05 LAB
ALBUMIN SERPL-MCNC: 2.3 G/DL (ref 3.5–5.2)
ALP SERPL-CCNC: 321 U/L (ref 40–129)
ALT SERPL-CCNC: 19 U/L (ref 5–41)
AST SERPL-CCNC: 60 U/L
BILIRUB DIRECT SERPL-MCNC: 1.4 MG/DL
BILIRUB INDIRECT SERPL-MCNC: 1.3 MG/DL (ref 0–1)
BILIRUB SERPL-MCNC: 2.7 MG/DL (ref 0.3–1.2)
PROT SERPL-MCNC: 5.6 G/DL (ref 6.4–8.3)

## 2024-01-05 PROCEDURE — 6360000002 HC RX W HCPCS: Performed by: NURSE PRACTITIONER

## 2024-01-05 PROCEDURE — 6370000000 HC RX 637 (ALT 250 FOR IP): Performed by: NURSE PRACTITIONER

## 2024-01-05 PROCEDURE — 6370000000 HC RX 637 (ALT 250 FOR IP): Performed by: SURGERY

## 2024-01-05 PROCEDURE — 80076 HEPATIC FUNCTION PANEL: CPT

## 2024-01-05 PROCEDURE — 6370000000 HC RX 637 (ALT 250 FOR IP): Performed by: INTERNAL MEDICINE

## 2024-01-05 PROCEDURE — 2580000003 HC RX 258: Performed by: INTERNAL MEDICINE

## 2024-01-05 PROCEDURE — 1200000000 HC SEMI PRIVATE

## 2024-01-05 PROCEDURE — 96375 TX/PRO/DX INJ NEW DRUG ADDON: CPT

## 2024-01-05 PROCEDURE — 96372 THER/PROPH/DIAG INJ SC/IM: CPT

## 2024-01-05 PROCEDURE — APPSS30 APP SPLIT SHARED TIME 16-30 MINUTES: Performed by: NURSE PRACTITIONER

## 2024-01-05 PROCEDURE — 99231 SBSQ HOSP IP/OBS SF/LOW 25: CPT | Performed by: INTERNAL MEDICINE

## 2024-01-05 PROCEDURE — 6360000002 HC RX W HCPCS: Performed by: INTERNAL MEDICINE

## 2024-01-05 PROCEDURE — 99233 SBSQ HOSP IP/OBS HIGH 50: CPT | Performed by: INTERNAL MEDICINE

## 2024-01-05 PROCEDURE — G0378 HOSPITAL OBSERVATION PER HR: HCPCS

## 2024-01-05 PROCEDURE — 36415 COLL VENOUS BLD VENIPUNCTURE: CPT

## 2024-01-05 RX ADMIN — Medication 2 MG: at 02:38

## 2024-01-05 RX ADMIN — RIFAXIMIN 400 MG: 200 TABLET ORAL at 10:05

## 2024-01-05 RX ADMIN — METOCLOPRAMIDE 10 MG: 5 INJECTION, SOLUTION INTRAMUSCULAR; INTRAVENOUS at 05:22

## 2024-01-05 RX ADMIN — CEFTRIAXONE SODIUM 1000 MG: 1 INJECTION, POWDER, FOR SOLUTION INTRAMUSCULAR; INTRAVENOUS at 11:47

## 2024-01-05 RX ADMIN — SODIUM CHLORIDE, PRESERVATIVE FREE 10 ML: 5 INJECTION INTRAVENOUS at 10:05

## 2024-01-05 RX ADMIN — CHLORDIAZEPOXIDE HYDROCHLORIDE 25 MG: 25 CAPSULE ORAL at 21:00

## 2024-01-05 RX ADMIN — SUCRALFATE 1 G: 1 TABLET ORAL at 14:17

## 2024-01-05 RX ADMIN — CHLORDIAZEPOXIDE HYDROCHLORIDE 25 MG: 25 CAPSULE ORAL at 09:57

## 2024-01-05 RX ADMIN — LACTULOSE 30 G: 20 SOLUTION ORAL at 10:03

## 2024-01-05 RX ADMIN — SODIUM CHLORIDE: 9 INJECTION, SOLUTION INTRAVENOUS at 11:47

## 2024-01-05 RX ADMIN — LACTULOSE 30 G: 20 SOLUTION ORAL at 21:00

## 2024-01-05 RX ADMIN — METOCLOPRAMIDE 10 MG: 5 INJECTION, SOLUTION INTRAMUSCULAR; INTRAVENOUS at 18:20

## 2024-01-05 RX ADMIN — GABAPENTIN 100 MG: 100 CAPSULE ORAL at 21:00

## 2024-01-05 RX ADMIN — RIFAXIMIN 400 MG: 200 TABLET ORAL at 22:03

## 2024-01-05 RX ADMIN — SPIRONOLACTONE 25 MG: 25 TABLET ORAL at 09:57

## 2024-01-05 RX ADMIN — SUCRALFATE 1 G: 1 TABLET ORAL at 05:22

## 2024-01-05 RX ADMIN — CHLORDIAZEPOXIDE HYDROCHLORIDE 25 MG: 25 CAPSULE ORAL at 14:17

## 2024-01-05 RX ADMIN — Medication 2 MG: at 00:53

## 2024-01-05 RX ADMIN — THIAMINE HCL TAB 100 MG 100 MG: 100 TAB at 09:57

## 2024-01-05 RX ADMIN — ENOXAPARIN SODIUM 40 MG: 100 INJECTION SUBCUTANEOUS at 10:01

## 2024-01-05 RX ADMIN — Medication 1 TABLET: at 09:57

## 2024-01-05 RX ADMIN — FUROSEMIDE 20 MG: 20 TABLET ORAL at 09:57

## 2024-01-05 RX ADMIN — NADOLOL 20 MG: 20 TABLET ORAL at 10:05

## 2024-01-05 RX ADMIN — PANTOPRAZOLE SODIUM 40 MG: 40 TABLET, DELAYED RELEASE ORAL at 09:57

## 2024-01-05 RX ADMIN — SODIUM CHLORIDE, PRESERVATIVE FREE 10 ML: 5 INJECTION INTRAVENOUS at 21:10

## 2024-01-05 RX ADMIN — METOCLOPRAMIDE 10 MG: 5 INJECTION, SOLUTION INTRAMUSCULAR; INTRAVENOUS at 11:48

## 2024-01-05 RX ADMIN — SUCRALFATE 1 G: 1 TABLET ORAL at 21:00

## 2024-01-05 RX ADMIN — Medication 400 MG: at 09:57

## 2024-01-05 RX ADMIN — RIFAXIMIN 400 MG: 200 TABLET ORAL at 14:17

## 2024-01-05 RX ADMIN — FERROUS SULFATE TAB 325 MG (65 MG ELEMENTAL FE) 325 MG: 325 (65 FE) TAB at 21:00

## 2024-01-05 RX ADMIN — FERROUS SULFATE TAB 325 MG (65 MG ELEMENTAL FE) 325 MG: 325 (65 FE) TAB at 09:57

## 2024-01-05 NOTE — CARE COORDINATION
DISCHARGE PLANNING NOTE:    Pt very confused at this time. Unable to answer questions.  Spoke with pt yesterday, and he declined SNF. Also declined ETOH abuse resources. Current with [facility].    Active order for PO Librium TID.    Will continue to follow for additional discharge needs.    Electronically signed by Leonila Chapa RN on 1/5/2024 at 3:40 PM

## 2024-01-06 LAB
ALBUMIN SERPL-MCNC: 2.4 G/DL (ref 3.5–5.2)
ALP SERPL-CCNC: 299 U/L (ref 40–129)
ALT SERPL-CCNC: 17 U/L (ref 5–41)
AMMONIA PLAS-SCNC: <10 UMOL/L (ref 16–60)
AST SERPL-CCNC: 52 U/L
BILIRUB DIRECT SERPL-MCNC: 1.3 MG/DL
BILIRUB INDIRECT SERPL-MCNC: 1.3 MG/DL (ref 0–1)
BILIRUB SERPL-MCNC: 2.6 MG/DL (ref 0.3–1.2)
MICROORGANISM SPEC CULT: ABNORMAL
PROT SERPL-MCNC: 5.5 G/DL (ref 6.4–8.3)
SPECIMEN DESCRIPTION: ABNORMAL

## 2024-01-06 PROCEDURE — G0378 HOSPITAL OBSERVATION PER HR: HCPCS

## 2024-01-06 PROCEDURE — 6370000000 HC RX 637 (ALT 250 FOR IP): Performed by: NURSE PRACTITIONER

## 2024-01-06 PROCEDURE — 36415 COLL VENOUS BLD VENIPUNCTURE: CPT

## 2024-01-06 PROCEDURE — 82140 ASSAY OF AMMONIA: CPT

## 2024-01-06 PROCEDURE — 99232 SBSQ HOSP IP/OBS MODERATE 35: CPT | Performed by: INTERNAL MEDICINE

## 2024-01-06 PROCEDURE — 80076 HEPATIC FUNCTION PANEL: CPT

## 2024-01-06 PROCEDURE — APPSS30 APP SPLIT SHARED TIME 16-30 MINUTES: Performed by: NURSE PRACTITIONER

## 2024-01-06 PROCEDURE — 2580000003 HC RX 258: Performed by: INTERNAL MEDICINE

## 2024-01-06 PROCEDURE — 6370000000 HC RX 637 (ALT 250 FOR IP): Performed by: INTERNAL MEDICINE

## 2024-01-06 PROCEDURE — 6370000000 HC RX 637 (ALT 250 FOR IP): Performed by: SURGERY

## 2024-01-06 PROCEDURE — 6360000002 HC RX W HCPCS: Performed by: INTERNAL MEDICINE

## 2024-01-06 PROCEDURE — 99231 SBSQ HOSP IP/OBS SF/LOW 25: CPT | Performed by: INTERNAL MEDICINE

## 2024-01-06 PROCEDURE — 96372 THER/PROPH/DIAG INJ SC/IM: CPT

## 2024-01-06 PROCEDURE — 1200000000 HC SEMI PRIVATE

## 2024-01-06 PROCEDURE — 97110 THERAPEUTIC EXERCISES: CPT

## 2024-01-06 RX ORDER — CHLORDIAZEPOXIDE HYDROCHLORIDE 25 MG/1
25 CAPSULE, GELATIN COATED ORAL 2 TIMES DAILY
Status: DISCONTINUED | OUTPATIENT
Start: 2024-01-06 | End: 2024-01-07

## 2024-01-06 RX ADMIN — SPIRONOLACTONE 25 MG: 25 TABLET ORAL at 09:38

## 2024-01-06 RX ADMIN — Medication 400 MG: at 09:38

## 2024-01-06 RX ADMIN — CHLORDIAZEPOXIDE HYDROCHLORIDE 25 MG: 25 CAPSULE ORAL at 21:52

## 2024-01-06 RX ADMIN — CHLORDIAZEPOXIDE HYDROCHLORIDE 25 MG: 25 CAPSULE ORAL at 09:39

## 2024-01-06 RX ADMIN — ONDANSETRON 4 MG: 2 INJECTION INTRAMUSCULAR; INTRAVENOUS at 22:26

## 2024-01-06 RX ADMIN — PANTOPRAZOLE SODIUM 40 MG: 40 TABLET, DELAYED RELEASE ORAL at 09:39

## 2024-01-06 RX ADMIN — ENOXAPARIN SODIUM 40 MG: 100 INJECTION SUBCUTANEOUS at 09:38

## 2024-01-06 RX ADMIN — FERROUS SULFATE TAB 325 MG (65 MG ELEMENTAL FE) 325 MG: 325 (65 FE) TAB at 09:38

## 2024-01-06 RX ADMIN — METOCLOPRAMIDE 10 MG: 5 INJECTION, SOLUTION INTRAMUSCULAR; INTRAVENOUS at 17:31

## 2024-01-06 RX ADMIN — THIAMINE HCL TAB 100 MG 100 MG: 100 TAB at 09:38

## 2024-01-06 RX ADMIN — SODIUM CHLORIDE, PRESERVATIVE FREE 10 ML: 5 INJECTION INTRAVENOUS at 09:39

## 2024-01-06 RX ADMIN — ACETAMINOPHEN 650 MG: 325 TABLET ORAL at 22:26

## 2024-01-06 RX ADMIN — GABAPENTIN 100 MG: 100 CAPSULE ORAL at 21:52

## 2024-01-06 RX ADMIN — SUCRALFATE 1 G: 1 TABLET ORAL at 05:14

## 2024-01-06 RX ADMIN — METOCLOPRAMIDE 10 MG: 5 INJECTION, SOLUTION INTRAMUSCULAR; INTRAVENOUS at 05:15

## 2024-01-06 RX ADMIN — SUCRALFATE 1 G: 1 TABLET ORAL at 13:53

## 2024-01-06 RX ADMIN — Medication 1 TABLET: at 09:38

## 2024-01-06 RX ADMIN — RIFAXIMIN 400 MG: 200 TABLET ORAL at 13:57

## 2024-01-06 RX ADMIN — RIFAXIMIN 400 MG: 200 TABLET ORAL at 21:54

## 2024-01-06 RX ADMIN — FUROSEMIDE 20 MG: 20 TABLET ORAL at 09:39

## 2024-01-06 RX ADMIN — SODIUM CHLORIDE, PRESERVATIVE FREE 10 ML: 5 INJECTION INTRAVENOUS at 21:53

## 2024-01-06 RX ADMIN — METOCLOPRAMIDE 10 MG: 5 INJECTION, SOLUTION INTRAMUSCULAR; INTRAVENOUS at 00:33

## 2024-01-06 RX ADMIN — METOCLOPRAMIDE 10 MG: 5 INJECTION, SOLUTION INTRAMUSCULAR; INTRAVENOUS at 13:53

## 2024-01-06 RX ADMIN — SUCRALFATE 1 G: 1 TABLET ORAL at 21:52

## 2024-01-06 RX ADMIN — NADOLOL 20 MG: 20 TABLET ORAL at 09:40

## 2024-01-06 RX ADMIN — FERROUS SULFATE TAB 325 MG (65 MG ELEMENTAL FE) 325 MG: 325 (65 FE) TAB at 21:52

## 2024-01-06 RX ADMIN — LACTULOSE 30 G: 20 SOLUTION ORAL at 13:53

## 2024-01-06 RX ADMIN — LACTULOSE 30 G: 20 SOLUTION ORAL at 21:52

## 2024-01-06 ASSESSMENT — PAIN SCALES - GENERAL: PAINLEVEL_OUTOF10: 3

## 2024-01-06 NOTE — CARE COORDINATION
ONGOING DISCHARGE PLAN:    Patient is lethargic and not able to answer questions.    UTI-IV rocephin    Ammonia 10    Librium decreased    PT/OT    Will continue to follow for additional discharge needs.    If patient is discharged prior to next notation, then this note serves as note for discharge by case management.    Electronically signed by Dianne Jarquin RN on 1/6/2024 at 3:13 PM

## 2024-01-07 PROCEDURE — 92610 EVALUATE SWALLOWING FUNCTION: CPT

## 2024-01-07 PROCEDURE — 6370000000 HC RX 637 (ALT 250 FOR IP): Performed by: NURSE PRACTITIONER

## 2024-01-07 PROCEDURE — 1200000000 HC SEMI PRIVATE

## 2024-01-07 PROCEDURE — 6370000000 HC RX 637 (ALT 250 FOR IP): Performed by: SURGERY

## 2024-01-07 PROCEDURE — 6370000000 HC RX 637 (ALT 250 FOR IP): Performed by: INTERNAL MEDICINE

## 2024-01-07 PROCEDURE — 97110 THERAPEUTIC EXERCISES: CPT

## 2024-01-07 PROCEDURE — 99232 SBSQ HOSP IP/OBS MODERATE 35: CPT | Performed by: INTERNAL MEDICINE

## 2024-01-07 PROCEDURE — APPSS30 APP SPLIT SHARED TIME 16-30 MINUTES: Performed by: NURSE PRACTITIONER

## 2024-01-07 PROCEDURE — G0378 HOSPITAL OBSERVATION PER HR: HCPCS

## 2024-01-07 PROCEDURE — 96372 THER/PROPH/DIAG INJ SC/IM: CPT

## 2024-01-07 PROCEDURE — 2580000003 HC RX 258: Performed by: INTERNAL MEDICINE

## 2024-01-07 PROCEDURE — 6360000002 HC RX W HCPCS: Performed by: INTERNAL MEDICINE

## 2024-01-07 RX ORDER — CHLORDIAZEPOXIDE HYDROCHLORIDE 10 MG/1
10 CAPSULE, GELATIN COATED ORAL 2 TIMES DAILY
Status: DISCONTINUED | OUTPATIENT
Start: 2024-01-07 | End: 2024-01-08

## 2024-01-07 RX ADMIN — CHLORDIAZEPOXIDE HYDROCHLORIDE 25 MG: 25 CAPSULE ORAL at 08:05

## 2024-01-07 RX ADMIN — FUROSEMIDE 20 MG: 20 TABLET ORAL at 08:05

## 2024-01-07 RX ADMIN — SUCRALFATE 1 G: 1 TABLET ORAL at 20:33

## 2024-01-07 RX ADMIN — FERROUS SULFATE TAB 325 MG (65 MG ELEMENTAL FE) 325 MG: 325 (65 FE) TAB at 08:05

## 2024-01-07 RX ADMIN — NADOLOL 20 MG: 20 TABLET ORAL at 08:06

## 2024-01-07 RX ADMIN — SUCRALFATE 1 G: 1 TABLET ORAL at 14:09

## 2024-01-07 RX ADMIN — SPIRONOLACTONE 25 MG: 25 TABLET ORAL at 08:05

## 2024-01-07 RX ADMIN — METOCLOPRAMIDE 10 MG: 5 INJECTION, SOLUTION INTRAMUSCULAR; INTRAVENOUS at 14:04

## 2024-01-07 RX ADMIN — RIFAXIMIN 400 MG: 200 TABLET ORAL at 08:06

## 2024-01-07 RX ADMIN — RIFAXIMIN 400 MG: 200 TABLET ORAL at 14:09

## 2024-01-07 RX ADMIN — CHLORDIAZEPOXIDE HYDROCHLORIDE 10 MG: 10 CAPSULE ORAL at 20:33

## 2024-01-07 RX ADMIN — Medication 400 MG: at 08:05

## 2024-01-07 RX ADMIN — SODIUM CHLORIDE, PRESERVATIVE FREE 10 ML: 5 INJECTION INTRAVENOUS at 20:34

## 2024-01-07 RX ADMIN — PANTOPRAZOLE SODIUM 40 MG: 40 TABLET, DELAYED RELEASE ORAL at 08:05

## 2024-01-07 RX ADMIN — GABAPENTIN 100 MG: 100 CAPSULE ORAL at 20:33

## 2024-01-07 RX ADMIN — SODIUM CHLORIDE, PRESERVATIVE FREE 10 ML: 5 INJECTION INTRAVENOUS at 08:05

## 2024-01-07 RX ADMIN — FERROUS SULFATE TAB 325 MG (65 MG ELEMENTAL FE) 325 MG: 325 (65 FE) TAB at 20:33

## 2024-01-07 RX ADMIN — THIAMINE HCL TAB 100 MG 100 MG: 100 TAB at 08:05

## 2024-01-07 RX ADMIN — Medication 1 TABLET: at 08:05

## 2024-01-07 RX ADMIN — LACTULOSE 30 G: 20 SOLUTION ORAL at 14:08

## 2024-01-07 RX ADMIN — SUCRALFATE 1 G: 1 TABLET ORAL at 06:32

## 2024-01-07 RX ADMIN — METOCLOPRAMIDE 10 MG: 5 INJECTION, SOLUTION INTRAMUSCULAR; INTRAVENOUS at 06:32

## 2024-01-07 RX ADMIN — METOCLOPRAMIDE 10 MG: 5 INJECTION, SOLUTION INTRAMUSCULAR; INTRAVENOUS at 23:53

## 2024-01-07 RX ADMIN — ENOXAPARIN SODIUM 40 MG: 100 INJECTION SUBCUTANEOUS at 08:05

## 2024-01-07 RX ADMIN — RIFAXIMIN 400 MG: 200 TABLET ORAL at 20:37

## 2024-01-07 ASSESSMENT — PAIN SCALES - GENERAL: PAINLEVEL_OUTOF10: 0

## 2024-01-07 NOTE — CARE COORDINATION
ONGOING DISCHARGE PLAN:    Patient is alert and oriented x4.    Spoke with patient regarding discharge plan and patient confirms that plan is still considering rehab. States no to [facility]. Agreeable to [facility] care.    UTI-IV rocephin    PT/OT    Librium decreased    Will continue to follow for additional discharge needs.    If patient is discharged prior to next notation, then this note serves as note for discharge by case management.    Electronically signed by Dianne Jarquin RN on 1/7/2024 at 8:37 AM

## 2024-01-08 ENCOUNTER — ANESTHESIA EVENT (OUTPATIENT)
Dept: ENDOSCOPY | Age: 64
DRG: 897 | End: 2024-01-08
Payer: COMMERCIAL

## 2024-01-08 ENCOUNTER — APPOINTMENT (OUTPATIENT)
Dept: GENERAL RADIOLOGY | Age: 64
DRG: 897 | End: 2024-01-08
Payer: COMMERCIAL

## 2024-01-08 PROCEDURE — 6360000002 HC RX W HCPCS: Performed by: INTERNAL MEDICINE

## 2024-01-08 PROCEDURE — 1200000000 HC SEMI PRIVATE

## 2024-01-08 PROCEDURE — 2580000003 HC RX 258: Performed by: INTERNAL MEDICINE

## 2024-01-08 PROCEDURE — 6370000000 HC RX 637 (ALT 250 FOR IP): Performed by: INTERNAL MEDICINE

## 2024-01-08 PROCEDURE — 6370000000 HC RX 637 (ALT 250 FOR IP): Performed by: NURSE PRACTITIONER

## 2024-01-08 PROCEDURE — APPSS30 APP SPLIT SHARED TIME 16-30 MINUTES: Performed by: NURSE PRACTITIONER

## 2024-01-08 PROCEDURE — 74230 X-RAY XM SWLNG FUNCJ C+: CPT

## 2024-01-08 PROCEDURE — 96372 THER/PROPH/DIAG INJ SC/IM: CPT

## 2024-01-08 PROCEDURE — 6370000000 HC RX 637 (ALT 250 FOR IP): Performed by: SURGERY

## 2024-01-08 PROCEDURE — G0378 HOSPITAL OBSERVATION PER HR: HCPCS

## 2024-01-08 PROCEDURE — 99233 SBSQ HOSP IP/OBS HIGH 50: CPT | Performed by: INTERNAL MEDICINE

## 2024-01-08 PROCEDURE — 99232 SBSQ HOSP IP/OBS MODERATE 35: CPT | Performed by: INTERNAL MEDICINE

## 2024-01-08 PROCEDURE — 92611 MOTION FLUOROSCOPY/SWALLOW: CPT

## 2024-01-08 PROCEDURE — 97116 GAIT TRAINING THERAPY: CPT

## 2024-01-08 RX ORDER — SODIUM CHLORIDE, SODIUM LACTATE, POTASSIUM CHLORIDE, CALCIUM CHLORIDE 600; 310; 30; 20 MG/100ML; MG/100ML; MG/100ML; MG/100ML
INJECTION, SOLUTION INTRAVENOUS CONTINUOUS
Status: DISCONTINUED | OUTPATIENT
Start: 2024-01-08 | End: 2024-01-10

## 2024-01-08 RX ORDER — CHLORDIAZEPOXIDE HYDROCHLORIDE 5 MG/1
5 CAPSULE, GELATIN COATED ORAL 2 TIMES DAILY
Status: DISCONTINUED | OUTPATIENT
Start: 2024-01-08 | End: 2024-01-09

## 2024-01-08 RX ADMIN — RIFAXIMIN 400 MG: 200 TABLET ORAL at 10:15

## 2024-01-08 RX ADMIN — METOCLOPRAMIDE 10 MG: 5 INJECTION, SOLUTION INTRAMUSCULAR; INTRAVENOUS at 10:12

## 2024-01-08 RX ADMIN — FERROUS SULFATE TAB 325 MG (65 MG ELEMENTAL FE) 325 MG: 325 (65 FE) TAB at 10:12

## 2024-01-08 RX ADMIN — RIFAXIMIN 400 MG: 200 TABLET ORAL at 22:02

## 2024-01-08 RX ADMIN — SPIRONOLACTONE 25 MG: 25 TABLET ORAL at 10:12

## 2024-01-08 RX ADMIN — LACTULOSE 30 G: 20 SOLUTION ORAL at 21:49

## 2024-01-08 RX ADMIN — GABAPENTIN 100 MG: 100 CAPSULE ORAL at 21:49

## 2024-01-08 RX ADMIN — SUCRALFATE 1 G: 1 TABLET ORAL at 21:49

## 2024-01-08 RX ADMIN — FUROSEMIDE 20 MG: 20 TABLET ORAL at 10:12

## 2024-01-08 RX ADMIN — SODIUM CHLORIDE, PRESERVATIVE FREE 10 ML: 5 INJECTION INTRAVENOUS at 10:14

## 2024-01-08 RX ADMIN — METOCLOPRAMIDE 10 MG: 5 INJECTION, SOLUTION INTRAMUSCULAR; INTRAVENOUS at 06:23

## 2024-01-08 RX ADMIN — SODIUM CHLORIDE, POTASSIUM CHLORIDE, SODIUM LACTATE AND CALCIUM CHLORIDE: 600; 310; 30; 20 INJECTION, SOLUTION INTRAVENOUS at 18:06

## 2024-01-08 RX ADMIN — PANTOPRAZOLE SODIUM 40 MG: 40 TABLET, DELAYED RELEASE ORAL at 10:12

## 2024-01-08 RX ADMIN — FERROUS SULFATE TAB 325 MG (65 MG ELEMENTAL FE) 325 MG: 325 (65 FE) TAB at 21:49

## 2024-01-08 RX ADMIN — LACTULOSE 30 G: 20 SOLUTION ORAL at 10:12

## 2024-01-08 RX ADMIN — SUCRALFATE 1 G: 1 TABLET ORAL at 06:23

## 2024-01-08 RX ADMIN — METOCLOPRAMIDE 10 MG: 5 INJECTION, SOLUTION INTRAMUSCULAR; INTRAVENOUS at 18:05

## 2024-01-08 RX ADMIN — THIAMINE HCL TAB 100 MG 100 MG: 100 TAB at 10:12

## 2024-01-08 RX ADMIN — Medication 400 MG: at 10:12

## 2024-01-08 RX ADMIN — Medication 1 TABLET: at 10:12

## 2024-01-08 RX ADMIN — CHLORDIAZEPOXIDE HYDROCHLORIDE 5 MG: 5 CAPSULE ORAL at 21:49

## 2024-01-08 ASSESSMENT — ENCOUNTER SYMPTOMS: SHORTNESS OF BREATH: 1

## 2024-01-08 NOTE — ANESTHESIA PRE PROCEDURE
low calcium), malignancy/cancer (Esophageal; INVASIVE ADENOCARCINOMA ARISING IN TUBULAR ADENOMA).                  ROS comment:  Admitted with generalized weakness and falls    H/o alcohol abuse and  Marijuana (Weed) use Abdominal:             Vascular:   + DVT.       Other Findings:         Anesthesia Plan      general     ASA 3     (TIVA)  Induction: intravenous.    MIPS: Prophylactic antiemetics administered.  Anesthetic plan and risks discussed with patient.      Plan discussed with CRNA.                  Gail Bonilla MD   1/8/2024

## 2024-01-08 NOTE — CARE COORDINATION
Writer is following for potential discharge plans, referral was sent to [facility].    Writer placed message to Graciela regarding referral. Facility is still reviewing this pt.  Electronically signed by LYNDSEY Person on 1/8/2024 at 11:08 AM

## 2024-01-09 ENCOUNTER — ANESTHESIA (OUTPATIENT)
Dept: ENDOSCOPY | Age: 64
DRG: 897 | End: 2024-01-09
Payer: COMMERCIAL

## 2024-01-09 LAB
ANION GAP SERPL CALCULATED.3IONS-SCNC: 9 MMOL/L (ref 9–17)
ATYPICAL LYMPHOCYTE ABSOLUTE COUNT: 0.11 K/UL
ATYPICAL LYMPHOCYTES: 2 %
BASOPHILS # BLD: 0.11 K/UL (ref 0–0.2)
BASOPHILS NFR BLD: 2 % (ref 0–2)
BUN SERPL-MCNC: 14 MG/DL (ref 8–23)
CALCIUM SERPL-MCNC: 8.2 MG/DL (ref 8.6–10.4)
CHLORIDE SERPL-SCNC: 106 MMOL/L (ref 98–107)
CO2 SERPL-SCNC: 24 MMOL/L (ref 20–31)
CREAT SERPL-MCNC: 1 MG/DL (ref 0.7–1.2)
EOSINOPHIL # BLD: 0.16 K/UL (ref 0–0.4)
EOSINOPHILS RELATIVE PERCENT: 3 % (ref 0–4)
ERYTHROCYTE [DISTWIDTH] IN BLOOD BY AUTOMATED COUNT: 14.7 % (ref 11.5–14.9)
GFR SERPL CREATININE-BSD FRML MDRD: >60 ML/MIN/1.73M2
GLUCOSE SERPL-MCNC: 82 MG/DL (ref 70–99)
HCT VFR BLD AUTO: 28.6 % (ref 41–53)
HGB BLD-MCNC: 9.5 G/DL (ref 13.5–17.5)
LYMPHOCYTES NFR BLD: 0.7 K/UL (ref 1–4.8)
LYMPHOCYTES RELATIVE PERCENT: 13 % (ref 24–44)
MAGNESIUM SERPL-MCNC: 1.1 MG/DL (ref 1.6–2.6)
MCH RBC QN AUTO: 32.7 PG (ref 26–34)
MCHC RBC AUTO-ENTMCNC: 33 G/DL (ref 31–37)
MCV RBC AUTO: 99.1 FL (ref 80–100)
MONOCYTES NFR BLD: 0.65 K/UL (ref 0.1–1.3)
MONOCYTES NFR BLD: 12 % (ref 1–7)
MORPHOLOGY: ABNORMAL
NEUTROPHILS NFR BLD: 68 % (ref 36–66)
NEUTS SEG NFR BLD: 3.67 K/UL (ref 1.3–9.1)
PLATELET # BLD AUTO: 126 K/UL (ref 150–450)
PMV BLD AUTO: 7.7 FL (ref 6–12)
POTASSIUM SERPL-SCNC: 2.9 MMOL/L (ref 3.7–5.3)
POTASSIUM SERPL-SCNC: 3.7 MMOL/L (ref 3.7–5.3)
RBC # BLD AUTO: 2.89 M/UL (ref 4.5–5.9)
SODIUM SERPL-SCNC: 139 MMOL/L (ref 135–144)
WBC OTHER # BLD: 5.4 K/UL (ref 3.5–11)

## 2024-01-09 PROCEDURE — 36415 COLL VENOUS BLD VENIPUNCTURE: CPT

## 2024-01-09 PROCEDURE — 96367 TX/PROPH/DG ADDL SEQ IV INF: CPT

## 2024-01-09 PROCEDURE — 6370000000 HC RX 637 (ALT 250 FOR IP): Performed by: INTERNAL MEDICINE

## 2024-01-09 PROCEDURE — 1200000000 HC SEMI PRIVATE

## 2024-01-09 PROCEDURE — 2500000003 HC RX 250 WO HCPCS: Performed by: INTERNAL MEDICINE

## 2024-01-09 PROCEDURE — 2720000010 HC SURG SUPPLY STERILE: Performed by: INTERNAL MEDICINE

## 2024-01-09 PROCEDURE — 3700000001 HC ADD 15 MINUTES (ANESTHESIA): Performed by: INTERNAL MEDICINE

## 2024-01-09 PROCEDURE — 2709999900 HC NON-CHARGEABLE SUPPLY: Performed by: INTERNAL MEDICINE

## 2024-01-09 PROCEDURE — 0DB58ZX EXCISION OF ESOPHAGUS, VIA NATURAL OR ARTIFICIAL OPENING ENDOSCOPIC, DIAGNOSTIC: ICD-10-PCS | Performed by: INTERNAL MEDICINE

## 2024-01-09 PROCEDURE — 88312 SPECIAL STAINS GROUP 1: CPT

## 2024-01-09 PROCEDURE — 0DH63UZ INSERTION OF FEEDING DEVICE INTO STOMACH, PERCUTANEOUS APPROACH: ICD-10-PCS | Performed by: INTERNAL MEDICINE

## 2024-01-09 PROCEDURE — 3700000000 HC ANESTHESIA ATTENDED CARE: Performed by: INTERNAL MEDICINE

## 2024-01-09 PROCEDURE — 7100000001 HC PACU RECOVERY - ADDTL 15 MIN: Performed by: INTERNAL MEDICINE

## 2024-01-09 PROCEDURE — 6360000002 HC RX W HCPCS: Performed by: INTERNAL MEDICINE

## 2024-01-09 PROCEDURE — 83735 ASSAY OF MAGNESIUM: CPT

## 2024-01-09 PROCEDURE — 43239 EGD BIOPSY SINGLE/MULTIPLE: CPT | Performed by: INTERNAL MEDICINE

## 2024-01-09 PROCEDURE — 2580000003 HC RX 258: Performed by: INTERNAL MEDICINE

## 2024-01-09 PROCEDURE — 97530 THERAPEUTIC ACTIVITIES: CPT

## 2024-01-09 PROCEDURE — 6360000002 HC RX W HCPCS: Performed by: NURSE ANESTHETIST, CERTIFIED REGISTERED

## 2024-01-09 PROCEDURE — 84132 ASSAY OF SERUM POTASSIUM: CPT

## 2024-01-09 PROCEDURE — 7100000000 HC PACU RECOVERY - FIRST 15 MIN: Performed by: INTERNAL MEDICINE

## 2024-01-09 PROCEDURE — 80048 BASIC METABOLIC PNL TOTAL CA: CPT

## 2024-01-09 PROCEDURE — 2580000003 HC RX 258: Performed by: ANESTHESIOLOGY

## 2024-01-09 PROCEDURE — 92526 ORAL FUNCTION THERAPY: CPT

## 2024-01-09 PROCEDURE — 85025 COMPLETE CBC W/AUTO DIFF WBC: CPT

## 2024-01-09 PROCEDURE — G0378 HOSPITAL OBSERVATION PER HR: HCPCS

## 2024-01-09 PROCEDURE — 88305 TISSUE EXAM BY PATHOLOGIST: CPT

## 2024-01-09 PROCEDURE — 96366 THER/PROPH/DIAG IV INF ADDON: CPT

## 2024-01-09 PROCEDURE — 43246 EGD PLACE GASTROSTOMY TUBE: CPT | Performed by: INTERNAL MEDICINE

## 2024-01-09 PROCEDURE — 99232 SBSQ HOSP IP/OBS MODERATE 35: CPT | Performed by: INTERNAL MEDICINE

## 2024-01-09 PROCEDURE — 3609013300 HC EGD TUBE PLACEMENT: Performed by: INTERNAL MEDICINE

## 2024-01-09 RX ORDER — CHLORDIAZEPOXIDE HYDROCHLORIDE 5 MG/1
5 CAPSULE, GELATIN COATED ORAL
Status: DISCONTINUED | OUTPATIENT
Start: 2024-01-10 | End: 2024-01-10

## 2024-01-09 RX ORDER — LIDOCAINE HYDROCHLORIDE 10 MG/ML
1 INJECTION, SOLUTION EPIDURAL; INFILTRATION; INTRACAUDAL; PERINEURAL
Status: ACTIVE | OUTPATIENT
Start: 2024-01-09 | End: 2024-01-10

## 2024-01-09 RX ORDER — PROPOFOL 10 MG/ML
INJECTION, EMULSION INTRAVENOUS PRN
Status: DISCONTINUED | OUTPATIENT
Start: 2024-01-09 | End: 2024-01-09 | Stop reason: SDUPTHER

## 2024-01-09 RX ORDER — CEFAZOLIN SODIUM 1 G/3ML
INJECTION, POWDER, FOR SOLUTION INTRAMUSCULAR; INTRAVENOUS PRN
Status: DISCONTINUED | OUTPATIENT
Start: 2024-01-09 | End: 2024-01-09 | Stop reason: SDUPTHER

## 2024-01-09 RX ORDER — SODIUM CHLORIDE 9 MG/ML
INJECTION, SOLUTION INTRAVENOUS PRN
Status: DISCONTINUED | OUTPATIENT
Start: 2024-01-09 | End: 2024-01-11 | Stop reason: HOSPADM

## 2024-01-09 RX ORDER — LIDOCAINE HYDROCHLORIDE 10 MG/ML
INJECTION, SOLUTION EPIDURAL; INFILTRATION; INTRACAUDAL; PERINEURAL PRN
Status: DISCONTINUED | OUTPATIENT
Start: 2024-01-09 | End: 2024-01-09 | Stop reason: ALTCHOICE

## 2024-01-09 RX ORDER — SODIUM CHLORIDE 0.9 % (FLUSH) 0.9 %
5-40 SYRINGE (ML) INJECTION PRN
Status: DISCONTINUED | OUTPATIENT
Start: 2024-01-09 | End: 2024-01-11 | Stop reason: HOSPADM

## 2024-01-09 RX ORDER — SODIUM CHLORIDE 0.9 % (FLUSH) 0.9 %
5-40 SYRINGE (ML) INJECTION EVERY 12 HOURS SCHEDULED
Status: DISCONTINUED | OUTPATIENT
Start: 2024-01-09 | End: 2024-01-11 | Stop reason: HOSPADM

## 2024-01-09 RX ORDER — SODIUM CHLORIDE, SODIUM LACTATE, POTASSIUM CHLORIDE, CALCIUM CHLORIDE 600; 310; 30; 20 MG/100ML; MG/100ML; MG/100ML; MG/100ML
INJECTION, SOLUTION INTRAVENOUS CONTINUOUS
Status: DISCONTINUED | OUTPATIENT
Start: 2024-01-09 | End: 2024-01-10

## 2024-01-09 RX ADMIN — MAGNESIUM SULFATE HEPTAHYDRATE 2000 MG: 40 INJECTION, SOLUTION INTRAVENOUS at 18:26

## 2024-01-09 RX ADMIN — POTASSIUM CHLORIDE 10 MEQ: 7.46 INJECTION, SOLUTION INTRAVENOUS at 15:49

## 2024-01-09 RX ADMIN — THIAMINE HCL TAB 100 MG 100 MG: 100 TAB at 11:08

## 2024-01-09 RX ADMIN — METOCLOPRAMIDE 10 MG: 5 INJECTION, SOLUTION INTRAMUSCULAR; INTRAVENOUS at 00:20

## 2024-01-09 RX ADMIN — RIFAXIMIN 400 MG: 200 TABLET ORAL at 20:06

## 2024-01-09 RX ADMIN — POTASSIUM CHLORIDE 10 MEQ: 7.46 INJECTION, SOLUTION INTRAVENOUS at 14:02

## 2024-01-09 RX ADMIN — PROPOFOL 20 MG: 10 INJECTION, EMULSION INTRAVENOUS at 09:19

## 2024-01-09 RX ADMIN — METOCLOPRAMIDE 10 MG: 5 INJECTION, SOLUTION INTRAMUSCULAR; INTRAVENOUS at 18:23

## 2024-01-09 RX ADMIN — LACTULOSE 30 G: 20 SOLUTION ORAL at 15:47

## 2024-01-09 RX ADMIN — GABAPENTIN 100 MG: 100 CAPSULE ORAL at 19:52

## 2024-01-09 RX ADMIN — POTASSIUM CHLORIDE 10 MEQ: 7.46 INJECTION, SOLUTION INTRAVENOUS at 12:52

## 2024-01-09 RX ADMIN — PROPOFOL 20 MG: 10 INJECTION, EMULSION INTRAVENOUS at 09:22

## 2024-01-09 RX ADMIN — METOCLOPRAMIDE 10 MG: 5 INJECTION, SOLUTION INTRAMUSCULAR; INTRAVENOUS at 11:09

## 2024-01-09 RX ADMIN — Medication 400 MG: at 11:09

## 2024-01-09 RX ADMIN — RIFAXIMIN 400 MG: 200 TABLET ORAL at 15:46

## 2024-01-09 RX ADMIN — POTASSIUM CHLORIDE 10 MEQ: 7.46 INJECTION, SOLUTION INTRAVENOUS at 17:22

## 2024-01-09 RX ADMIN — CHLORDIAZEPOXIDE HYDROCHLORIDE 5 MG: 5 CAPSULE ORAL at 11:03

## 2024-01-09 RX ADMIN — SUCRALFATE 1 G: 1 TABLET ORAL at 15:46

## 2024-01-09 RX ADMIN — FERROUS SULFATE TAB 325 MG (65 MG ELEMENTAL FE) 325 MG: 325 (65 FE) TAB at 11:09

## 2024-01-09 RX ADMIN — SODIUM CHLORIDE, POTASSIUM CHLORIDE, SODIUM LACTATE AND CALCIUM CHLORIDE: 600; 310; 30; 20 INJECTION, SOLUTION INTRAVENOUS at 18:17

## 2024-01-09 RX ADMIN — Medication 1 TABLET: at 11:08

## 2024-01-09 RX ADMIN — SUCRALFATE 1 G: 1 TABLET ORAL at 22:22

## 2024-01-09 RX ADMIN — POTASSIUM CHLORIDE 10 MEQ: 7.46 INJECTION, SOLUTION INTRAVENOUS at 11:02

## 2024-01-09 RX ADMIN — LACTULOSE 30 G: 20 SOLUTION ORAL at 19:52

## 2024-01-09 RX ADMIN — METOCLOPRAMIDE 10 MG: 5 INJECTION, SOLUTION INTRAMUSCULAR; INTRAVENOUS at 06:16

## 2024-01-09 RX ADMIN — SODIUM CHLORIDE, PRESERVATIVE FREE 10 ML: 5 INJECTION INTRAVENOUS at 11:10

## 2024-01-09 RX ADMIN — PROPOFOL 20 MG: 10 INJECTION, EMULSION INTRAVENOUS at 09:16

## 2024-01-09 RX ADMIN — SPIRONOLACTONE 25 MG: 25 TABLET ORAL at 11:09

## 2024-01-09 RX ADMIN — PANTOPRAZOLE SODIUM 40 MG: 40 TABLET, DELAYED RELEASE ORAL at 11:08

## 2024-01-09 RX ADMIN — FERROUS SULFATE TAB 325 MG (65 MG ELEMENTAL FE) 325 MG: 325 (65 FE) TAB at 19:52

## 2024-01-09 RX ADMIN — CEFAZOLIN 1 G: 1 INJECTION, POWDER, FOR SOLUTION INTRAMUSCULAR; INTRAVENOUS at 09:16

## 2024-01-09 ASSESSMENT — PAIN - FUNCTIONAL ASSESSMENT
PAIN_FUNCTIONAL_ASSESSMENT: 0-10
PAIN_FUNCTIONAL_ASSESSMENT: NONE - DENIES PAIN

## 2024-01-09 NOTE — ANESTHESIA POSTPROCEDURE EVALUATION
Department of Anesthesiology  Postprocedure Note    Patient: Frank Lisa  MRN: [id number]  YOB: 1960  Date of evaluation: 1/9/2024    Procedure Summary       Date: 01/09/24 Room / Location: [place] [address] / [facility]    Anesthesia Start: 0913 Anesthesia Stop: 0944    Procedure: EGD BIOPSY ESOPHAGOGASTRODUODENOSCOPY PEG TUBE INSERTION (Esophagus) Diagnosis:       Aspiration of foreign body, initial encounter      (aspiration)    Surgeons: Kole Guo MD Responsible Provider: Hermelindo Holt MD    Anesthesia Type: General ASA Status: 3            Anesthesia Type: General    Yen Phase I: Yen Score: 10    Yen Phase II:      Anesthesia Post Evaluation    Comments: POST- ANESTHESIA EVALUATION       Pt Name: Frank Lisa  MRN: [id number]  YOB: 1960  Date of evaluation: 1/9/2024  Time:  1:07 PM      BP (!) 109/48   Pulse 76   Temp 97.9 °F (36.6 °C) (Oral)   Resp 22   Ht 1.778 m (5' 10\")   Wt 84 kg (185 lb 3 oz)   SpO2 97%   BMI 26.57 kg/m²      Consciousness Level  Awake  Cardiopulmonary Status  Stable  Pain Adequately Treated YES  Nausea / Vomiting  NO  Adequate Hydration  YES  Anesthesia Related Complications NONE      Electronically signed by Hermelindo Holt MD on 1/9/2024 at 1:07 PM           No notable events documented.

## 2024-01-09 NOTE — OP NOTE
PROCEDURE NOTE    DATE OF PROCEDURE: 1/9/2024     SURGEON: Kole Guo MD  Facility: [facility]  ASSISTANT: None  Anesthesia: Monitored anesthesia care  PREOPERATIVE DIAGNOSIS: EGD with PEG tube placement    Diagnosis:    POSTOPERATIVE DIAGNOSIS: As described below    OPERATION: Upper GI endoscopy with Biopsy    ANESTHESIA: Moderate Sedation     ESTIMATED BLOOD LOSS: Less than 50 ml    COMPLICATIONS: None.     SPECIMENS:  Was Obtained: GE junction    HISTORY: The patient is a 63 y.o. year old male with history of above preop diagnosis.  I recommended esophagogastroduodenoscopy with possible biopsy and I explained the risk, benefits, expected outcome, and alternatives to the procedure.  Risks included but are not limited to bleeding, infection, respiratory distress, hypotension, and perforation of the esophagus, stomach, or duodenum.  Patient understands and is in agreement.      PROCEDURE: The patient was given IV conscious sedation.  The patient's SPO2 remained above 90% throughout the procedure.The gastroscope was inserted orally and advanced under direct vision through the esophagus, through the stomach, through the pylorus, and into the descending duodenum.      Post sedation note :The patient's SPO2 remained above 90% throughout the procedure.the vital signs remained stable , and no immediate complication form the procedure noted, patient will be ready for d/c when criteria is met .      Findings:    Retropharyngeal area was grossly normal appearing    Esophagus: abnormal: small mucosal thickening at GE junction with hemoclip attached, biopsy obtained    Esophagogastric markings: Diaphragmatic hiatus- 40 cm; GE junction- 40 cm; Squamo-columnar junction- 40 cm    Stomach:    Fundus: normal    Body: normal    Antrum: normal    Duodenum:     Descending: normal    Bulb: normal    The PEG tube placement site in the stomach was determined by transillumination method which was marked externally with a cap of

## 2024-01-09 NOTE — CARE COORDINATION
Writer placed message to Grac[facility] regarding referral. Facility has pending referrals and may not have a bed available. Graciela states when she returns to facility she will look into this further.  Electronically signed by LYNDSEY Person on 1/9/2024 at 1:38 PM

## 2024-01-09 NOTE — CARE COORDINATION
Writer met with pt to discuss [facility] reviewing referral and possible need for back up choices.  Pt agreeable to referrals sent to [facility] and [facility].  Referrals sent.  Electronically signed by LYNDSEY Person on 1/9/2024 at 3:01 PM

## 2024-01-10 PROCEDURE — G0378 HOSPITAL OBSERVATION PER HR: HCPCS

## 2024-01-10 PROCEDURE — 97530 THERAPEUTIC ACTIVITIES: CPT

## 2024-01-10 PROCEDURE — 6360000002 HC RX W HCPCS: Performed by: INTERNAL MEDICINE

## 2024-01-10 PROCEDURE — 6370000000 HC RX 637 (ALT 250 FOR IP): Performed by: INTERNAL MEDICINE

## 2024-01-10 PROCEDURE — 2580000003 HC RX 258: Performed by: ANESTHESIOLOGY

## 2024-01-10 PROCEDURE — APPSS30 APP SPLIT SHARED TIME 16-30 MINUTES: Performed by: NURSE PRACTITIONER

## 2024-01-10 PROCEDURE — 1200000000 HC SEMI PRIVATE

## 2024-01-10 PROCEDURE — 2580000003 HC RX 258: Performed by: INTERNAL MEDICINE

## 2024-01-10 PROCEDURE — 99232 SBSQ HOSP IP/OBS MODERATE 35: CPT | Performed by: INTERNAL MEDICINE

## 2024-01-10 PROCEDURE — 96372 THER/PROPH/DIAG INJ SC/IM: CPT

## 2024-01-10 PROCEDURE — 97116 GAIT TRAINING THERAPY: CPT

## 2024-01-10 PROCEDURE — 92526 ORAL FUNCTION THERAPY: CPT

## 2024-01-10 RX ORDER — ACETAMINOPHEN 325 MG/1
650 TABLET ORAL EVERY 6 HOURS PRN
Status: DISCONTINUED | OUTPATIENT
Start: 2024-01-10 | End: 2024-01-11 | Stop reason: HOSPADM

## 2024-01-10 RX ORDER — POLYETHYLENE GLYCOL 3350 17 G/17G
17 POWDER, FOR SOLUTION ORAL DAILY PRN
Status: DISCONTINUED | OUTPATIENT
Start: 2024-01-10 | End: 2024-01-11 | Stop reason: HOSPADM

## 2024-01-10 RX ORDER — MULTIVIT-MIN/FERROUS GLUCONATE 9 MG/15 ML
15 LIQUID (ML) ORAL DAILY
Status: DISCONTINUED | OUTPATIENT
Start: 2024-01-11 | End: 2024-01-11 | Stop reason: HOSPADM

## 2024-01-10 RX ORDER — POTASSIUM CHLORIDE 20 MEQ/1
40 TABLET, EXTENDED RELEASE ORAL PRN
Status: DISCONTINUED | OUTPATIENT
Start: 2024-01-10 | End: 2024-01-11 | Stop reason: HOSPADM

## 2024-01-10 RX ORDER — MULTIVIT-MIN/FERROUS GLUCONATE 9 MG/15 ML
15 LIQUID (ML) ORAL DAILY
Status: DISCONTINUED | OUTPATIENT
Start: 2024-01-10 | End: 2024-01-10

## 2024-01-10 RX ORDER — POTASSIUM CHLORIDE 7.45 MG/ML
10 INJECTION INTRAVENOUS PRN
Status: DISCONTINUED | OUTPATIENT
Start: 2024-01-10 | End: 2024-01-11 | Stop reason: HOSPADM

## 2024-01-10 RX ORDER — FERROUS SULFATE 325(65) MG
325 TABLET ORAL 2 TIMES DAILY
Status: DISCONTINUED | OUTPATIENT
Start: 2024-01-10 | End: 2024-01-11

## 2024-01-10 RX ORDER — LANSOPRAZOLE 30 MG/1
30 TABLET, ORALLY DISINTEGRATING, DELAYED RELEASE ORAL
Status: DISCONTINUED | OUTPATIENT
Start: 2024-01-11 | End: 2024-01-11 | Stop reason: HOSPADM

## 2024-01-10 RX ORDER — SPIRONOLACTONE 25 MG/1
25 TABLET ORAL DAILY
Status: DISCONTINUED | OUTPATIENT
Start: 2024-01-11 | End: 2024-01-11 | Stop reason: HOSPADM

## 2024-01-10 RX ORDER — LACTULOSE 10 G/15ML
30 SOLUTION ORAL 3 TIMES DAILY
Status: DISCONTINUED | OUTPATIENT
Start: 2024-01-10 | End: 2024-01-11 | Stop reason: HOSPADM

## 2024-01-10 RX ORDER — GAUZE BANDAGE 2" X 2"
100 BANDAGE TOPICAL DAILY
Status: DISCONTINUED | OUTPATIENT
Start: 2024-01-11 | End: 2024-01-11 | Stop reason: HOSPADM

## 2024-01-10 RX ORDER — ACETAMINOPHEN 650 MG/1
650 SUPPOSITORY RECTAL EVERY 6 HOURS PRN
Status: DISCONTINUED | OUTPATIENT
Start: 2024-01-10 | End: 2024-01-11 | Stop reason: HOSPADM

## 2024-01-10 RX ORDER — GABAPENTIN 100 MG/1
100 CAPSULE ORAL NIGHTLY
Status: DISCONTINUED | OUTPATIENT
Start: 2024-01-10 | End: 2024-01-11 | Stop reason: HOSPADM

## 2024-01-10 RX ORDER — NADOLOL 20 MG/1
20 TABLET ORAL DAILY
Status: DISCONTINUED | OUTPATIENT
Start: 2024-01-11 | End: 2024-01-11

## 2024-01-10 RX ORDER — LANOLIN ALCOHOL/MO/W.PET/CERES
400 CREAM (GRAM) TOPICAL DAILY
Status: DISCONTINUED | OUTPATIENT
Start: 2024-01-11 | End: 2024-01-11 | Stop reason: HOSPADM

## 2024-01-10 RX ADMIN — CHLORDIAZEPOXIDE HYDROCHLORIDE 5 MG: 5 CAPSULE ORAL at 05:55

## 2024-01-10 RX ADMIN — Medication 15 ML: at 11:24

## 2024-01-10 RX ADMIN — RIFAXIMIN 400 MG: 200 TABLET ORAL at 20:43

## 2024-01-10 RX ADMIN — SODIUM CHLORIDE, PRESERVATIVE FREE 10 ML: 5 INJECTION INTRAVENOUS at 11:26

## 2024-01-10 RX ADMIN — METOCLOPRAMIDE 10 MG: 5 INJECTION, SOLUTION INTRAMUSCULAR; INTRAVENOUS at 12:39

## 2024-01-10 RX ADMIN — RIFAXIMIN 400 MG: 200 TABLET ORAL at 09:49

## 2024-01-10 RX ADMIN — FERROUS SULFATE TAB 325 MG (65 MG ELEMENTAL FE) 325 MG: 325 (65 FE) TAB at 09:42

## 2024-01-10 RX ADMIN — GABAPENTIN 100 MG: 100 CAPSULE ORAL at 20:43

## 2024-01-10 RX ADMIN — NADOLOL 20 MG: 20 TABLET ORAL at 11:26

## 2024-01-10 RX ADMIN — Medication 1 TABLET: at 09:42

## 2024-01-10 RX ADMIN — RIFAXIMIN 400 MG: 200 TABLET ORAL at 15:05

## 2024-01-10 RX ADMIN — SODIUM CHLORIDE, PRESERVATIVE FREE 10 ML: 5 INJECTION INTRAVENOUS at 20:43

## 2024-01-10 RX ADMIN — METOCLOPRAMIDE 10 MG: 5 INJECTION, SOLUTION INTRAMUSCULAR; INTRAVENOUS at 16:52

## 2024-01-10 RX ADMIN — SUCRALFATE 1 G: 1 TABLET ORAL at 05:55

## 2024-01-10 RX ADMIN — METOCLOPRAMIDE 10 MG: 5 INJECTION, SOLUTION INTRAMUSCULAR; INTRAVENOUS at 23:50

## 2024-01-10 RX ADMIN — FERROUS SULFATE TAB 325 MG (65 MG ELEMENTAL FE) 325 MG: 325 (65 FE) TAB at 20:43

## 2024-01-10 RX ADMIN — METOCLOPRAMIDE 10 MG: 5 INJECTION, SOLUTION INTRAMUSCULAR; INTRAVENOUS at 00:36

## 2024-01-10 RX ADMIN — METOCLOPRAMIDE 10 MG: 5 INJECTION, SOLUTION INTRAMUSCULAR; INTRAVENOUS at 05:55

## 2024-01-10 RX ADMIN — SUCRALFATE 1 G: 1 TABLET ORAL at 20:43

## 2024-01-10 RX ADMIN — THIAMINE HCL TAB 100 MG 100 MG: 100 TAB at 09:43

## 2024-01-10 RX ADMIN — PANTOPRAZOLE SODIUM 40 MG: 40 TABLET, DELAYED RELEASE ORAL at 09:42

## 2024-01-10 RX ADMIN — LACTULOSE 30 G: 20 SOLUTION ORAL at 20:43

## 2024-01-10 RX ADMIN — SUCRALFATE 1 G: 1 TABLET ORAL at 15:05

## 2024-01-10 RX ADMIN — SPIRONOLACTONE 25 MG: 25 TABLET ORAL at 09:42

## 2024-01-10 RX ADMIN — Medication 400 MG: at 09:42

## 2024-01-10 NOTE — ANESTHESIA POSTPROCEDURE EVALUATION
Department of Anesthesiology  Postprocedure Note    Patient: Frank Lisa  MRN: [id number]  YOB: 1960  Date of evaluation: 1/10/2024    Procedure Summary       Date: 01/09/24 Room / Location: [place] [address] / [facility]    Anesthesia Start: 0913 Anesthesia Stop: 0944    Procedure: EGD BIOPSY ESOPHAGOGASTRODUODENOSCOPY PEG TUBE INSERTION (Esophagus) Diagnosis:       Aspiration of foreign body, initial encounter      (aspiration)    Surgeons: Kole Guo MD Responsible Provider: Hermelindo Holt MD    Anesthesia Type: General ASA Status: 3            Anesthesia Type: General    Yen Phase I: Yen Score: 10    Yen Phase II:      Anesthesia Post Evaluation    Comments: POD #1 Patient seen lying in bed. Denied any anesthesia related issues.    No notable events documented.

## 2024-01-10 NOTE — CARE COORDINATION
Writer is following for potential discharge plans.  Writer spoke to Graciela at [facility]. Facility is reviewing this pt still.    Writer spoke to Cari at [facility]. Facility denies this pt. Writer spoke to Madie at [facility]. Facility does not have bed availability at this time.    Electronically signed by LYNDSEY Person on 1/10/2024 at 10:16 AM    Writer spoke to Graciela at [facility]. Facility can accept this pt, will start insurance authorization.  Electronically signed by LYNDSEY Person on 1/10/2024 at 12:42 PM

## 2024-01-11 VITALS
OXYGEN SATURATION: 98 % | DIASTOLIC BLOOD PRESSURE: 49 MMHG | TEMPERATURE: 98.4 F | HEART RATE: 68 BPM | WEIGHT: 185.19 LBS | BODY MASS INDEX: 26.51 KG/M2 | HEIGHT: 70 IN | RESPIRATION RATE: 16 BRPM | SYSTOLIC BLOOD PRESSURE: 93 MMHG

## 2024-01-11 LAB — SURGICAL PATHOLOGY REPORT: NORMAL

## 2024-01-11 PROCEDURE — 92526 ORAL FUNCTION THERAPY: CPT

## 2024-01-11 PROCEDURE — G0378 HOSPITAL OBSERVATION PER HR: HCPCS

## 2024-01-11 PROCEDURE — 97116 GAIT TRAINING THERAPY: CPT

## 2024-01-11 PROCEDURE — 99239 HOSP IP/OBS DSCHRG MGMT >30: CPT | Performed by: INTERNAL MEDICINE

## 2024-01-11 PROCEDURE — 6360000002 HC RX W HCPCS: Performed by: INTERNAL MEDICINE

## 2024-01-11 PROCEDURE — 2580000003 HC RX 258: Performed by: ANESTHESIOLOGY

## 2024-01-11 PROCEDURE — 6370000000 HC RX 637 (ALT 250 FOR IP): Performed by: INTERNAL MEDICINE

## 2024-01-11 RX ORDER — PNV NO.95/FERROUS FUM/FOLIC AC 28MG-0.8MG
1 TABLET ORAL DAILY
Qty: 30 TABLET | Refills: 5 | Status: SHIPPED | OUTPATIENT
Start: 2024-01-11

## 2024-01-11 RX ORDER — SUCRALFATE 1 G/1
1 TABLET ORAL EVERY 8 HOURS SCHEDULED
Qty: 42 TABLET | Refills: 0 | Status: SHIPPED | OUTPATIENT
Start: 2024-01-11 | End: 2024-01-25

## 2024-01-11 RX ORDER — THIAMINE MONONITRATE (VIT B1) 100 MG
100 TABLET ORAL DAILY
Qty: 90 TABLET | Refills: 0 | Status: SHIPPED | OUTPATIENT
Start: 2024-01-12

## 2024-01-11 RX ORDER — NADOLOL 20 MG/1
10 TABLET ORAL DAILY
Status: DISCONTINUED | OUTPATIENT
Start: 2024-01-12 | End: 2024-01-11 | Stop reason: HOSPADM

## 2024-01-11 RX ORDER — FERROUS SULFATE 325(65) MG
1 TABLET ORAL 2 TIMES DAILY
Qty: 60 TABLET | Refills: 5 | Status: SHIPPED | OUTPATIENT
Start: 2024-01-11

## 2024-01-11 RX ORDER — FUROSEMIDE 20 MG/1
20 TABLET ORAL DAILY
Qty: 90 TABLET | Refills: 0 | Status: SHIPPED | OUTPATIENT
Start: 2024-01-11

## 2024-01-11 RX ORDER — FERROUS SULFATE 300 MG/5ML
300 LIQUID (ML) ORAL 2 TIMES DAILY
Status: DISCONTINUED | OUTPATIENT
Start: 2024-01-11 | End: 2024-01-11 | Stop reason: HOSPADM

## 2024-01-11 RX ORDER — LANSOPRAZOLE 30 MG/1
30 TABLET, ORALLY DISINTEGRATING, DELAYED RELEASE ORAL
Qty: 30 TABLET | Refills: 2 | Status: SHIPPED | OUTPATIENT
Start: 2024-01-12

## 2024-01-11 RX ORDER — MULTIVIT-MIN/FERROUS GLUCONATE 9 MG/15 ML
15 LIQUID (ML) ORAL DAILY
Qty: 480 ML | Refills: 0 | Status: SHIPPED | OUTPATIENT
Start: 2024-01-12

## 2024-01-11 RX ORDER — NADOLOL 20 MG/1
10 TABLET ORAL DAILY
Qty: 30 TABLET | Refills: 3 | Status: SHIPPED | OUTPATIENT
Start: 2024-01-12

## 2024-01-11 RX ORDER — LACTULOSE 10 G/15ML
30 SOLUTION ORAL 3 TIMES DAILY
Qty: 2700 ML | Refills: 3 | Status: SHIPPED | OUTPATIENT
Start: 2024-01-11

## 2024-01-11 RX ORDER — POTASSIUM CHLORIDE 20 MEQ/15ML
20 SOLUTION ORAL DAILY
Qty: 900 ML | Refills: 0 | Status: SHIPPED | OUTPATIENT
Start: 2024-01-11

## 2024-01-11 RX ADMIN — SUCRALFATE 1 G: 1 TABLET ORAL at 05:01

## 2024-01-11 RX ADMIN — THIAMINE HCL TAB 100 MG 100 MG: 100 TAB at 09:34

## 2024-01-11 RX ADMIN — RIFAXIMIN 400 MG: 200 TABLET ORAL at 09:33

## 2024-01-11 RX ADMIN — SUCRALFATE 1 G: 1 TABLET ORAL at 15:21

## 2024-01-11 RX ADMIN — METOCLOPRAMIDE 10 MG: 5 INJECTION, SOLUTION INTRAMUSCULAR; INTRAVENOUS at 11:33

## 2024-01-11 RX ADMIN — Medication 300 MG: at 09:35

## 2024-01-11 RX ADMIN — LANSOPRAZOLE 30 MG: 30 TABLET, ORALLY DISINTEGRATING, DELAYED RELEASE ORAL at 05:01

## 2024-01-11 RX ADMIN — SODIUM CHLORIDE, PRESERVATIVE FREE 10 ML: 5 INJECTION INTRAVENOUS at 09:45

## 2024-01-11 RX ADMIN — Medication 400 MG: at 09:34

## 2024-01-11 RX ADMIN — METOCLOPRAMIDE 10 MG: 5 INJECTION, SOLUTION INTRAMUSCULAR; INTRAVENOUS at 05:01

## 2024-01-11 RX ADMIN — Medication 15 ML: at 09:35

## 2024-01-11 RX ADMIN — RIFAXIMIN 400 MG: 200 TABLET ORAL at 15:21

## 2024-01-11 NOTE — PROGRESS NOTES
[facility]    Gastroenterology Daily Progress Note      Patient:   Frank Lisa   :    1960   Facility:   [facility]  Date:     2024  Consultant:   MASSIMO Coello - CNP, CNP      SUBJECTIVE  63 y.o. male admitted 2024 with Nausea [R11.0]  General weakness [R53.1]  Intractable nausea and vomiting [R11.2] and seen for abdominal pain, cirrhosis. The pt was seen and examined. The pt is confused but alert, going thru alcohol withdrawal and was started on librium reports RLQ groin pain, no emesis per aide.  Had four non bloody stools yesterday.         OBJECTIVE  Scheduled Meds:   thiamine  100 mg Oral Daily    chlordiazePOXIDE  25 mg Oral TID    multivitamin  1 tablet Oral Daily    rifAXIMin  400 mg Oral TID    sucralfate  1 g Oral 3 times per day    cefTRIAXone (ROCEPHIN) IV  1,000 mg IntraVENous Q24H    sodium chloride flush  5-40 mL IntraVENous 2 times per day    enoxaparin  40 mg SubCUTAneous Daily    metoclopramide  10 mg IntraVENous Q6H    ferrous sulfate  325 mg Oral BID    gabapentin  100 mg Oral Nightly    lactulose  30 g Oral TID    nadolol  20 mg Oral Daily    pantoprazole  40 mg Oral Daily    magnesium oxide  400 mg Oral Daily    furosemide  20 mg Oral Daily    spironolactone  25 mg Oral Daily       Vital Signs:  BP (!) 149/83   Pulse (!) 110   Temp 98.2 °F (36.8 °C) (Oral)   Resp 18   Ht 1.778 m (5' 10\")   Wt 83 kg (182 lb 15.7 oz)   SpO2 100%   BMI 26.26 kg/m²    ROS not completed due to AMS  Physical Exam:     General Appearance: ill appearing alert and oriented to person, p well-developed and well-nourished, in no acute distress  Skin: warm and dry, no rash or erythema  Head: normocephalic and atraumatic  Eyes: pupils equal, round, and reactive to light, extraocular eye movements intact, conjunctivae normal  ENT: hearing grossly normal bilaterally  Neck: neck supple and non tender without mass, no thyromegaly or thyroid nodules, no cervical 
    [facility] Internal Medicine  Marcelo Sutton MD; Parmjit Parry MD; Jaziel Cerda MD; MD Jessika Diego MD; Alexia Tobar MD    [facility] Internal Medicine   IN-PATIENT SERVICE   [facility]     HISTORY AND PHYSICAL EXAMINATION            Date:   1/3/2024  Patient name:  Frank Lisa  Date of admission:  1/2/2024 12:40 PM  MRN:   [id number]  Account:  [id number]  YOB: 1960  PCP:    Lopez Rosario PA  Room:   2073/2073-01  Code Status:    Full Code      Chief Complaint:     Persistent nausea intractable with generalized weakness and falls    History Obtained From:     Patient medical record nursing    History of Present Illness:     63-year-old gentleman with a history of alcohol abuse history of alcoholic cirrhosis history of Lezama's esophagus and bleeding gastric varices active drinker on daily basis patient claims his last drink was Saturday since then he has been having nausea and vomiting initially now persistent intractable nausea unable to eat or drink at risk for dehydration lives alone with his dog was too weak to walk feed himself or hydrate himself  Patient noted to have hypokalemia potassium 3.3 with a decreased BUN at 6 low calcium at 7.8 low protein suggesting mild protein calorie malnutrition  Patient albumin is down to 2.4  Noted to be normocytic anemia hemoglobin 11.8 with thrombocytopenia 72,000 drop from 145,000 last November      Past Medical History:     Past Medical History:   Diagnosis Date    Abnormal EKG     Acute deep vein thrombosis (DVT) of brachial vein of right upper extremity (HCC) 01/07/2023    Also- Superficial venous thrombosis of the cephalic vein in the forearm    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 Mc, orthopedic, every 3-4 months, gets steroid injection    Teja 
    [facility] Internal Medicine  Marcelo Sutton MD; Parmjit Parry MD; Jaziel Cerda MD; MD Jessika Diego MD; Alexia Tobar MD    [facility] Internal Medicine   IN-PATIENT SERVICE   [facility]     HISTORY AND PHYSICAL EXAMINATION            Date:   1/7/2024  Patient name:  Frank Lisa  Date of admission:  1/2/2024 12:40 PM  MRN:   [id number]  Account:  [id number]  YOB: 1960  PCP:    Lopez Rosario PA  Room:   2059/2059-01  Code Status:    Full Code      Chief Complaint:     Persistent nausea intractable with generalized weakness and falls    History Obtained From:     Patient medical record nursing    History of Present Illness:     63-year-old gentleman with a history of alcohol abuse history of alcoholic cirrhosis history of Lezama's esophagus and bleeding gastric varices active drinker on daily basis patient claims his last drink was Saturday since then he has been having nausea and vomiting initially now persistent intractable nausea unable to eat or drink at risk for dehydration lives alone with his dog was too weak to walk feed himself or hydrate himself  Patient noted to have hypokalemia potassium 3.3 with a decreased BUN at 6 low calcium at 7.8 low protein suggesting mild protein calorie malnutrition  Patient albumin is down to 2.4  Noted to be normocytic anemia hemoglobin 11.8 with thrombocytopenia 72,000 drop from 145,000 last November      Past Medical History:     Past Medical History:   Diagnosis Date    Abnormal EKG     Acute deep vein thrombosis (DVT) of brachial vein of right upper extremity (HCC) 01/07/2023    Also- Superficial venous thrombosis of the cephalic vein in the forearm    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 Mc, orthopedic, every 3-4 months, gets steroid injection    Teja 
    [facility] Internal Medicine  Marcelo Sutton MD; Parmjit Parry MD; Jaziel Cerda MD; MD Jessika Diego MD; Alexia Tobar MD    [facility] Internal Medicine   IN-PATIENT SERVICE   [facility]     HISTORY AND PHYSICAL EXAMINATION            Date:   1/5/2024  Patient name:  Frank Lisa  Date of admission:  1/2/2024 12:40 PM  MRN:   [id number]  Account:  [id number]  YOB: 1960  PCP:    Lopez Rosario PA  Room:   2059/2059-01  Code Status:    Full Code      Chief Complaint:     Persistent nausea intractable with generalized weakness and falls    History Obtained From:     Patient medical record nursing    History of Present Illness:     63-year-old gentleman with a history of alcohol abuse history of alcoholic cirrhosis history of Lezama's esophagus and bleeding gastric varices active drinker on daily basis patient claims his last drink was Saturday since then he has been having nausea and vomiting initially now persistent intractable nausea unable to eat or drink at risk for dehydration lives alone with his dog was too weak to walk feed himself or hydrate himself  Patient noted to have hypokalemia potassium 3.3 with a decreased BUN at 6 low calcium at 7.8 low protein suggesting mild protein calorie malnutrition  Patient albumin is down to 2.4  Noted to be normocytic anemia hemoglobin 11.8 with thrombocytopenia 72,000 drop from 145,000 last November      Past Medical History:     Past Medical History:   Diagnosis Date    Abnormal EKG     Acute deep vein thrombosis (DVT) of brachial vein of right upper extremity (HCC) 01/07/2023    Also- Superficial venous thrombosis of the cephalic vein in the forearm    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 cM, orthopedic, every 3-4 months, gets steroid injection    Teja 
    [facility] Internal Medicine  Marcelo Sutton MD; Parmjit Parry MD; Jaziel Cerda MD; MD Jessika Diego MD; Alexia Tobar MD    [facility] Internal Medicine   IN-PATIENT SERVICE   [facility]     HISTORY AND PHYSICAL EXAMINATION            Date:   1/4/2024  Patient name:  Frank Lisa  Date of admission:  1/2/2024 12:40 PM  MRN:   [id number]  Account:  [id number]  YOB: 1960  PCP:    Lopez Rosario PA  Room:   2059/2059-01  Code Status:    Full Code      Chief Complaint:     Persistent nausea intractable with generalized weakness and falls    History Obtained From:     Patient medical record nursing    History of Present Illness:     63-year-old gentleman with a history of alcohol abuse history of alcoholic cirrhosis history of Lezama's esophagus and bleeding gastric varices active drinker on daily basis patient claims his last drink was Saturday since then he has been having nausea and vomiting initially now persistent intractable nausea unable to eat or drink at risk for dehydration lives alone with his dog was too weak to walk feed himself or hydrate himself  Patient noted to have hypokalemia potassium 3.3 with a decreased BUN at 6 low calcium at 7.8 low protein suggesting mild protein calorie malnutrition  Patient albumin is down to 2.4  Noted to be normocytic anemia hemoglobin 11.8 with thrombocytopenia 72,000 drop from 145,000 last November      Past Medical History:     Past Medical History:   Diagnosis Date    Abnormal EKG     Acute deep vein thrombosis (DVT) of brachial vein of right upper extremity (HCC) 01/07/2023    Also- Superficial venous thrombosis of the cephalic vein in the forearm    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 Mc, orthopedic, every 3-4 months, gets steroid injection    Teja 
   01/05/24 1230   Encounter Summary   Encounter Overview/Reason  Volunteer Encounter   Service Provided For: Patient   Referral/Consult From: Ralph   Last Encounter  01/05/24   Spiritual/Emotional needs   Type Spiritual Support   Assessment/Intervention/Outcome   Intervention Prayer (assurance of)/[place]       
   GI Progress notes    1/10/2024   11:25 AM    Name:  Frank Lisa  MRN:    [id number]     Acct:     [id number]   Room:  2059/2059-01   Day: 8     Admit Date: 1/2/2024 12:40 PM  PCP: Lopez Rosario PA    Subjective:     C/C:   Chief Complaint   Patient presents with    Abdominal Pain    Fatigue       Interval History:    Patient seen and examined.  No acute events overnight.  S/p EGD with PEG placement  Bx obtained from GE junction    Abdomen soft  BS active  PEG site satisfactory.    ROS:  Constitutional: negative for chills, fevers and sweats  Respiratory: negative for cough, dyspnea on exertion, hemoptysis, shortness of breath and wheezing  Cardiovascular: negative for chest pain, chest pressure/discomfort, dyspnea, lower extremity edema and palpitations  Gastrointestinal: negative for abdominal pain, constipation, diarrhea, nausea and vomiting  Neurological: negative for dizziness and headaches    Medications:     Allergies:   Allergies   Allergen Reactions    Pollen Extract Other (See Comments)     Runny nose, watery eyes       Current Meds: CENTRUM/CERTA-LUCILLE with minerals oral solution 15 mL, Daily  sodium chloride flush 0.9 % injection 5-40 mL, 2 times per day  sodium chloride flush 0.9 % injection 5-40 mL, PRN  0.9 % sodium chloride infusion, PRN  chlordiazePOXIDE (LIBRIUM) capsule 5 mg, QAM AC  thiamine mononitrate tablet 100 mg, Daily  rifAXIMin (XIFAXAN) tablet 400 mg, TID  sucralfate (CARAFATE) tablet 1 g, 3 times per day  sodium chloride flush 0.9 % injection 5-40 mL, 2 times per day  sodium chloride flush 0.9 % injection 10 mL, PRN  0.9 % sodium chloride infusion, PRN  potassium chloride (KLOR-CON M) extended release tablet 40 mEq, PRN   Or  potassium bicarb-citric acid (EFFER-K) effervescent tablet 40 mEq, PRN   Or  potassium chloride 10 mEq/100 mL IVPB (Peripheral Line), PRN  magnesium sulfate 1000 mg in dextrose 5% 100 mL IVPB, PRN  [Held by provider] enoxaparin (LOVENOX) injection 40 mg, 
   GI Progress notes    1/7/2024   9:16 AM    Name:  Frank Lisa  MRN:    [id number]     Acct:     [id number]   Room:  2059/2059-01   Day: 5     Admit Date: 1/2/2024 12:40 PM  PCP: Lopez Rosario PA    Subjective:     C/C:   Chief Complaint   Patient presents with    Abdominal Pain    Fatigue       Interval History: Status: improved.     Patient seen and examined.  No acute events overnight.  Clinically much improved.  He is alert, oriented.  Tolerating breakfast this am  Continues on IV Rocephin for UTI    ROS:  Constitutional: negative for chills, fevers and sweats  Gastrointestinal: negative for abdominal pain, constipation, diarrhea, nausea and vomiting  Neurological: negative for dizziness and headaches    Medications:     Allergies:   Allergies   Allergen Reactions    Pollen Extract Other (See Comments)     Runny nose, watery eyes       Current Meds: chlordiazePOXIDE (LIBRIUM) capsule 25 mg, BID  thiamine mononitrate tablet 100 mg, Daily  therapeutic multivitamin-minerals 1 tablet, Daily  rifAXIMin (XIFAXAN) tablet 400 mg, TID  sucralfate (CARAFATE) tablet 1 g, 3 times per day  sodium chloride flush 0.9 % injection 5-40 mL, 2 times per day  sodium chloride flush 0.9 % injection 10 mL, PRN  0.9 % sodium chloride infusion, PRN  potassium chloride (KLOR-CON M) extended release tablet 40 mEq, PRN   Or  potassium bicarb-citric acid (EFFER-K) effervescent tablet 40 mEq, PRN   Or  potassium chloride 10 mEq/100 mL IVPB (Peripheral Line), PRN  magnesium sulfate 1000 mg in dextrose 5% 100 mL IVPB, PRN  enoxaparin (LOVENOX) injection 40 mg, Daily  ondansetron (ZOFRAN-ODT) disintegrating tablet 4 mg, Q8H PRN   Or  ondansetron (ZOFRAN) injection 4 mg, Q6H PRN  polyethylene glycol (GLYCOLAX) packet 17 g, Daily PRN  acetaminophen (TYLENOL) tablet 650 mg, Q6H PRN   Or  acetaminophen (TYLENOL) suppository 650 mg, Q6H PRN  metoclopramide (REGLAN) injection 10 mg, Q6H  magnesium sulfate 2000 mg in water 50 mL IVPB, 
   GI Progress notes    1/8/2024   10:49 AM    Name:  Frank Lisa  MRN:    [id number]     Acct:     [id number]   Room:  2059/2059-01   Day: 6     Admit Date: 1/2/2024 12:40 PM  PCP: Lopez Rosario PA    Subjective:     C/C:   Chief Complaint   Patient presents with    Abdominal Pain    Fatigue       Interval History: Status: improved.     Patient seen and examined  No acute events overnight  He is alert, oriented  Tolerating diet  Reports some L flank pain today    ROS:  Constitutional: negative for chills, fevers and sweats  Gastrointestinal: negative for abdominal pain, constipation, diarrhea, nausea and vomiting  Neurological: negative for dizziness and headaches    Medications:     Allergies:   Allergies   Allergen Reactions    Pollen Extract Other (See Comments)     Runny nose, watery eyes       Current Meds: chlordiazePOXIDE (LIBRIUM) capsule 5 mg, BID  thiamine mononitrate tablet 100 mg, Daily  therapeutic multivitamin-minerals 1 tablet, Daily  rifAXIMin (XIFAXAN) tablet 400 mg, TID  sucralfate (CARAFATE) tablet 1 g, 3 times per day  sodium chloride flush 0.9 % injection 5-40 mL, 2 times per day  sodium chloride flush 0.9 % injection 10 mL, PRN  0.9 % sodium chloride infusion, PRN  potassium chloride (KLOR-CON M) extended release tablet 40 mEq, PRN   Or  potassium bicarb-citric acid (EFFER-K) effervescent tablet 40 mEq, PRN   Or  potassium chloride 10 mEq/100 mL IVPB (Peripheral Line), PRN  magnesium sulfate 1000 mg in dextrose 5% 100 mL IVPB, PRN  enoxaparin (LOVENOX) injection 40 mg, Daily  ondansetron (ZOFRAN-ODT) disintegrating tablet 4 mg, Q8H PRN   Or  ondansetron (ZOFRAN) injection 4 mg, Q6H PRN  polyethylene glycol (GLYCOLAX) packet 17 g, Daily PRN  acetaminophen (TYLENOL) tablet 650 mg, Q6H PRN   Or  acetaminophen (TYLENOL) suppository 650 mg, Q6H PRN  metoclopramide (REGLAN) injection 10 mg, Q6H  magnesium sulfate 2000 mg in water 50 mL IVPB, PRN  ferrous sulfate (IRON 325) tablet 325 mg, 
[facility]   Occupational Therapy Evaluation  Date: 24  Patient Name: Frank Lisa       Room: -  MRN: [id number]  Account: [id number]   : 1960  (63 y.o.) Gender: male     Discharge Recommendations:  Further Occupational Therapy is recommended upon facility discharge.    OT Equipment Recommendations  Other: TBD    Referring Practitioner: Jaziel Cerda MD  Diagnosis: Intractable nausea and vomiting; Nausea; General weakness Additional Pertinent Hx: The patient is a 63 y.o. male alcoholic with cirrhosis who presented with persistent nausea and vomiting.  Patient has a long history of alcohol abuse and severe cirrhosis.  Patient is status post right colectomy with ileostomy and subsequent reversal of same.  Patient admitted and underwent hydration and had CAT scan of abdomen and pelvis which demonstrated abnormality with question of free intra-abdominal air.  Patient was tolerating a diet moving his bowels without difficulty prior to use of alcohol with subsequent nausea and vomiting just prior to admission.    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), Alcoholic cirrhosis of liver with ascites (HCC), Anemia, Anxiety, Arthritis, Back pain, chronic, Lezama esophagus, Bleeding gastric varices, BPH (benign prostatic hypertrophy), Cholelithiasis, Cirrhosis (HCC), COVID-19, COVID-19 vaccine series completed, DDD (degenerative disc disease), lumbar, Depression, Esophageal cancer (HCC), Esophageal varices (HCC), Fatty liver, GERD (gastroesophageal reflux disease), GI bleed, Heart murmur, Hep C w/o coma, chronic (HCC), Hiatal hernia, History of alcohol abuse, History of blood transfusion, History of colon polyps, History of tobacco abuse, [place] (hard of hearing), Hyperlipidemia, 
Comprehensive Nutrition Assessment    Type and Reason for Visit:  Positive Nutrition Screen (wt loss, poor appetite)    Nutrition Recommendations/Plan:   Will continue Regular diet based on minimal intake and add Ensure Plus all trays     Malnutrition Assessment:  Malnutrition Status:  At risk for malnutrition (Comment) (01/04/24 1615)    Context:  Chronic Illness     Findings of the 6 clinical characteristics of malnutrition:  Energy Intake:  75% or less estimated energy requirements for 1 month or longer  Weight Loss:   (7% over 1 year)     Body Fat Loss:  Unable to assess     Muscle Mass Loss:  Unable to assess    Fluid Accumulation:  No significant fluid accumulation     Strength:  Not Performed    Nutrition Assessment:    Pt admitted due to intractable N/V which he originally stated started on 12/30, but told GI has been going on since October. Observed pt's lunch tray consumed bites only but drinking supplements. Review of wt history shows wt has been stable since October but is down 14# over the past year.    Nutrition Related Findings:    no edema, Labs: Reviewed, Meds: Lactulose, Reglan, Thiamine, BM 1/4, PMH: ETOH Cirrhosis/Bleeding Varices, Lezama's Esophagus, Esophageal Ca, Adenoma of Colon Wound Type: None       Current Nutrition Intake & Therapies:    Average Meal Intake: 26-50%  Average Supplements Intake: %  ADULT ORAL NUTRITION SUPPLEMENT; Breakfast, Lunch, Dinner; Standard High Calorie/High Protein Oral Supplement  ADULT DIET; Regular    Anthropometric Measures:  Height: 177.8 cm (5' 10\")  Ideal Body Weight (IBW): 166 lbs (75 kg)    Admission Body Weight: 80.7 kg (178 lb)  Current Body Weight: 80.9 kg (178 lb 5.6 oz) (verified by RD),   IBW. Weight Source: Bed Scale  Current BMI (kg/m2): 25.6  Usual Body Weight: 87.1 kg (192 lb) (1/23)  % Weight Change (Calculated): -7.1                    BMI Categories: Overweight (BMI 25.0-29.9)    Estimated Daily Nutrient Needs:  Energy Requirements 
Comprehensive Nutrition Assessment    Type and Reason for Visit:  Reassess    Nutrition Recommendations/Plan:   Recommend Osmolite 1.5 goal of 50 ml per hour with 1 protein modular daily. This regimen will provide 1900 kcal, 100 g protein     Malnutrition Assessment:  Malnutrition Status:  At risk for malnutrition (Comment) (01/04/24 4691)    Context:  Chronic Illness     Findings of the 6 clinical characteristics of malnutrition:  Energy Intake:  75% or less estimated energy requirements for 1 month or longer  Weight Loss:   (7% over 1 year)     Body Fat Loss:  Unable to assess     Muscle Mass Loss:  Unable to assess    Fluid Accumulation:  No significant fluid accumulation     Strength:  Not Performed    Nutrition Assessment:    Results of Modified Barium Swallow noted.    Nutrition Related Findings:    no edema, labs: reviewed, Meds: Lactulose, BM 1/6 Wound Type: None       Current Nutrition Intake & Therapies:    Average Meal Intake: NPO  Average Supplements Intake: %  Diet NPO    Anthropometric Measures:  Height: 177.8 cm (5' 10\")  Ideal Body Weight (IBW): 166 lbs (75 kg)    Admission Body Weight: 80.7 kg (178 lb)  Current Body Weight: 83.9 kg (185 lb),   IBW. Weight Source: Bed Scale  Current BMI (kg/m2): 26.5  Usual Body Weight: 87.1 kg (192 lb) (1/23)  % Weight Change (Calculated): -7.1                    BMI Categories: Overweight (BMI 25.0-29.9)    Estimated Daily Nutrient Needs:  Energy Requirements Based On: Formula  Weight Used for Energy Requirements: Current  Energy (kcal/day): [place] x 1.2= 1900 kcal  Weight Used for Protein Requirements: Current  Protein (g/day): 1.3g/kg= 105 g     Fluid (ml/day): per MD    Nutrition Diagnosis:   Inadequate oral intake related to  (current medical condition) as evidenced by nausea, vomiting, poor intake prior to admission, intake 26-50%    Nutrition Interventions:   Food and/or Nutrient Delivery: Start Tube Feeding  Nutrition Education/Counseling: No 
Comprehensive Nutrition Assessment    Type and Reason for Visit:  Reassess, Consult (TF [facility])    Nutrition Recommendations/Plan:   Recommend advance OSmolite 1.5 as tolerated to goal of 50 ml per hour with 1 protein modular daily to provide 1900 kcal, 100g protein     Malnutrition Assessment:  Malnutrition Status:  At risk for malnutrition (Comment) (01/04/24 3139)    Context:  Chronic Illness     Findings of the 6 clinical characteristics of malnutrition:  Energy Intake:  75% or less estimated energy requirements for 1 month or longer  Weight Loss:   (7% over 1 year)     Body Fat Loss:  Unable to assess     Muscle Mass Loss:  Unable to assess    Fluid Accumulation:  No significant fluid accumulation     Strength:  Not Performed    Nutrition Assessment:    Pt is s/p (1/9) PEG. Plan is to start tube feed today.    Nutrition Related Findings:    no edema, Labs/Meds: Reviewed BM 1/10 Wound Type: None       Current Nutrition Intake & Therapies:    Average Meal Intake: NPO  Average Supplements Intake: %  Diet NPO  ADULT TUBE FEEDING; PEG; Other Tube Feeding (specify); Osmolite 1.5; Continuous; 20; Yes; 10; Q 4 hours; 50; 30; Q 6 hours; Protein; give 1 protein modular daily at noon    Anthropometric Measures:  Height: 177.8 cm (5' 10\")  Ideal Body Weight (IBW): 166 lbs (75 kg)    Admission Body Weight: 80.7 kg (178 lb)  Current Body Weight: 83.9 kg (185 lb),   IBW. Weight Source: Bed Scale  Current BMI (kg/m2): 26.5  Usual Body Weight: 87.1 kg (192 lb) (1/23)  % Weight Change (Calculated): -7.1                    BMI Categories: Overweight (BMI 25.0-29.9)    Estimated Daily Nutrient Needs:  Energy Requirements Based On: Formula  Weight Used for Energy Requirements: Current  Energy (kcal/day): [place] x 1.2= 1900 kcal  Weight Used for Protein Requirements: Current  Protein (g/day): 1.3g/kg= 105 g     Fluid (ml/day): per MD    Nutrition Diagnosis:   Inadequate oral intake related to  (current medical condition) as 
DATE: 2024    NAME: Frank Lisa  MRN: [id number]   : 1960    Patient not seen this date for Physical Therapy due to:      [] Cancel by RN or physician due to:    [] Hemodialysis    [] Critical Lab Value Level     [] Blood transfusion in progress    [] Acute or unstable cardiovascular status   _MAP < 55 or more than >115  _HR < 40 or > 130    [] Acute or unstable pulmonary status   -FiO2 > 60%   _RR < 5 or >40    _O2 sats < 85%    [] Strict Bedrest    [] Off Unit for surgery or procedure    [] Off Unit for testing       [] Pending imaging to R/O fracture    [] Refusal by Patient      [x] Other Pt not appropriate this a.m., patient unable to follow conversation, not able to direct patient to time or place, will continue to pursue when more appropriate.      [] PT being discontinued at this time. Patient independent. No further needs.     [] PT being discontinued at this time as the patient has been transferred to hospice care. No further needs.      Jocelyne Cornejo, PTA    
DATE: 2024    NAME: Frank Lisa  MRN: [id number]   : 1960    Patient not seen this date for Physical Therapy due to:      [] Cancel by RN or physician due to:    [] Hemodialysis    [] Critical Lab Value Level     [] Blood transfusion in progress    [] Acute or unstable cardiovascular status   _MAP < 55 or more than >115  _HR < 40 or > 130    [] Acute or unstable pulmonary status   -FiO2 > 60%   _RR < 5 or >40    _O2 sats < 85%    [] Strict Bedrest    [x] Off Unit for surgery or procedure. EGD    [] Off Unit for testing       [] Pending imaging to R/O fracture    [] Refusal by Patient      [] Other      [] PT being discontinued at this time. Patient independent. No further needs.     [] PT being discontinued at this time as the patient has been transferred to hospice care. No further needs.      Jocelyne Cornejo, PTA    
Dr. Bajwa notified about patient refusing meds and his b/p meds being held for low blood pressure.  
Dr. Bajwa states patient may have a small amount of ice chips also he was informed that patients POA would like to talk to him states he will be over here when he can.  
Dr. Waddell notified about consult for leaking was informed that as long as patient is eating and having bowel movements he does not need to see patient. Dr. Cerda and Dr. Gordon notified.  
[facility]   OCCUPATIONAL THERAPY MISSED TREATMENT NOTE   INPATIENT   Date: 1/10/24  Patient Name: Frank Lisa       Room:   MRN: [id number]   Account #: [id number]    : 1960  (63 y.o.)  Gender: male   Referring Practitioner: Jaziel Cerda MD  Diagnosis: Intractable nausea and vomiting; Nausea; General weakness             REASON FOR MISSED TREATMENT:  1/10/24    -    Pt sleeping upon arrival, rouses with verbal cues. Pt reports he is tired and wants to go back to sleep since he just had therapy. This writer educates pt on importance of participation in occupational therapy. Pt states \"How late do you work? You know what, just come back tomorrow.\" OT will continue to follow.      1203-0128       Electronically signed by STACEY Ramirez on 1/10/24 at 2:59 PM EST    
[facility]  IN-PATIENT SERVICE  [facility]    PROGRESS NOTE             Date:   1/8/2024  Patient name:  Frank Lisa  Date of admission:  1/2/2024 12:40 PM  MRN:   [id number]  YOB: 1960    INTERVAL HISTORY:      SUBJECTIVE:  Doing well, no concerns of alcohol withdrawal currently.  Tolerating oral diet well, denies nausea vomiting abdominal discomfort, no fevers no chills.    No other concerns.    Objective   Vitals:    01/06/24 1830 01/07/24 0613 01/07/24 1804 01/08/24 0620   BP: 112/62 112/60 (!) 107/53 (!) 98/53   Pulse: 82 85 78 77   Resp:  18 18 18   Temp: 99 °F (37.2 °C) 97.7 °F (36.5 °C) 98.4 °F (36.9 °C) 97.5 °F (36.4 °C)   TempSrc:    Oral   SpO2: 97% 93% 98% 99%   Weight:       Height:         BP Readings from Last 6 Encounters:   01/08/24 (!) 98/53   12/01/23 133/75   11/13/23 97/64   11/07/23 127/76   10/31/23 110/62   10/25/23 (!) 122/57          Intake/Output Summary (Last 24 hours) at 1/8/2024 1003  Last data filed at 1/7/2024 1055  Gross per 24 hour   Intake 100 ml   Output --   Net 100 ml     General appearance: Fairly nourished  HEENT: Normocephalic, no lesions, without obvious abnormality.pupils equal and reactive, EOM normal  Lungs: clear to auscultation bilaterally  Heart: regular rate and rhythm, S1, S2 normal, no murmur, click, rub or gallop  Abdomen: soft, non-tender; bowel sounds normal; no masses,  no organomegaly  Extremities: extremities normal, atraumatic, no cyanosis or edema. Pulses 2+ and symmetrical in all 4 extremities no significant tremors  Neurological: Alert oriented x 3, moves all 4 extremities spontaneously.  Makes adequate conversation    CBC: No results for input(s): \"WBC\", \"HGB\", \"PLT\" in the last 72 hours.  BMP:  No results for input(s): \"NA\", \"K\", \"CL\", \"CO2\", \"BUN\", \"CREATININE\", \"CALCIUM\" in the last 72 hours.  Magnesium:No results for input(s): \"MG\" in the last 72 hours.  Recent Labs     01/06/24  0629 
[facility]   INPATIENT OCCUPATIONAL THERAPY  PROGRESS NOTE  Date: 2024  Patient Name: Frank Lisa       Room:   MRN: [id number]    : 1960  (63 y.o.)  Gender: male   Referring Practitioner: Jaziel Cerda MD  Diagnosis: Intractable nausea and vomiting; Nausea; General weakness      Discharge Recommendations:  Further Occupational Therapy is recommended upon facility discharge.    OT Equipment Recommendations  Other: TBD    Restrictions/Precautions  Restrictions/Precautions  Restrictions/Precautions: Seizure;Fall Risk (ileostomy, port right chest, IV right forearm)  Required Braces or Orthoses?: No  Implants present? :  (denies)  Position Activity Restriction  Other position/activity restrictions: up w/ assist    O2 Device: None (Room air)    Subjective  Subjective  Subjective: \"Yeah, I want to get up and get moving\" Pt is pleasant and agreeable to therapy.  Subjective  Pain: denies pain  Comments: JERI Carr tx.    Objective  Orientation  Overall Orientation Status: Within Functional Limits  Cognition  Overall Cognitive Status: Exceptions  Arousal/Alertness: Delayed responses to stimuli;Inconsistent responses to stimuli  Attention Span: Difficulty attending to directions  Memory: Decreased short term memory;Decreased long term memory  Safety Judgement: Decreased awareness of need for assistance;Decreased awareness of need for safety  Problem Solving: Assistance required to implement solutions;Assistance required to generate solutions  Insights: Decreased awareness of deficits  Initiation: Requires cues for some  Sequencing: Requires cues for some    Activities of Daily Living  ADL  Feeding: Setup  Grooming: Stand by assistance  UE Bathing: Minimal assistance  LE Bathing: Moderate assistance  UE Dressing: Minimal assistance  LE Dressing: Maximum assistance  Toileting: Maximum assistance  Additional Comments: ADL scores are based on skilled observation and 
Occupational Therapy  [facility]   OCCUPATIONAL THERAPY MISSED TREATMENT NOTE   INPATIENT   Date: 24  Patient Name: Frank Lisa       Room:   MRN: [id number]   Account #: [id number]    : 1960  (63 y.o.)  Gender: male   Referring Practitioner: Jaziel Cerda MD  Diagnosis: Intractable nausea and vomiting; Nausea; General weakness             REASON FOR MISSED TREATMENT:  Hold treatment per nursing request   1430-    pt sleeping upon arrival. Jocelyn WILCOX states to let pt stay sleeping at this time. Will continue to follow for OT needs.         Electronically signed by MANUELITO Caro on 24 at 3:10 PM EST  
Occupational Therapy  [facility]   OCCUPATIONAL THERAPY MISSED TREATMENT NOTE   INPATIENT   Date: 24  Patient Name: Frank Lisa       Room:   MRN: [id number]   Account #: [id number]    : 1960  (63 y.o.)  Gender: male   Referring Practitioner: Jaziel Cerda MD  Diagnosis: Intractable nausea and vomiting; Nausea; General weakness             REASON FOR MISSED TREATMENT:  Patient at testing and/or off the floor  1320 -   pt OOR at this time for swallow study. Will continue to follow for OT needs.        Electronically signed by MANUELITO Caro on 24 at 1:41 PM EST  
Patient complaining of pin and needles in hands. Writer can physicallly see paatients hand tremors. Patient states he is a daily drinker and drinks around 6 beers a day and hasn't had a drink since Saturday. Perfect serve sent to Candace Khan NP. CORKYWA scale received.   
Patient d/c'd to [facility] via wheelchair by [facility]. Report called to nurse Tim.  
Patient upset about being n.p.o., requests ice chips.  Explained the risk of aspiration, verbalizes understanding.    
Physical Therapy  Facility/Department: [facility] MED SURG  Daily Treatment Note  NAME: Frank Lisa  : 1960  MRN: [id number]    Date of Service: 2024    Discharge Recommendations:  Continue to assess pending progress, Patient would benefit from continued therapy after discharge, Therapy recommended at discharge        Patient Diagnosis(es): The primary encounter diagnosis was General weakness. A diagnosis of Nausea was also pertinent to this visit.    Assessment   Assessment: very lethargic  Activity Tolerance: Patient limited by fatigue     Plan    Physical Therapy Plan  General Plan: 5-7 times per week  Specific Instructions for Next Treatment: advance gait distance, instruct in exercise program  Current Treatment Recommendations: Strengthening;Equipment evaluation, education, & procurement;Endurance training;Balance training;Functional mobility training;Transfer training;Gait training;Stair training;Patient/Caregiver education & training;Safety education & training;Home exercise program;Therapeutic activities     Restrictions  Restrictions/Precautions  Restrictions/Precautions: Seizure, Fall Risk (ileostomy, port right chest, IV right forearm)  Required Braces or Orthoses?: No  Implants present? :  (denies)  Position Activity Restriction  Other position/activity restrictions: up w/ assist     Subjective    Subjective  Subjective: Per RN Jeana, pt ok for PT. Pt in bed resting; kept his eyes closed during session but nodded his head in agreement to participate. Pt very lethargic but attempting to assist in ROM at times.  Pain: pt reports pain but did not provide location or rate pain; no facial grimacing noted with ROM  Orientation  Overall Orientation Status: Impaired  Orientation Level: Oriented to person;Disoriented to situation;Oriented to time;Disoriented to place     Objective   Bed Mobility Training  Bed Mobility Training: No (not appropriate)     PT Exercises  PROM Exercises: AA/PROM BLEs in all 
Physical Therapy  Facility/Department: [facility] MED SURG  Daily Treatment Note  NAME: Frank Lisa  : 1960  MRN: [id number]    Date of Service: 2024    Discharge Recommendations:  Continue to assess pending progress, Patient would benefit from continued therapy after discharge, Therapy recommended at discharge        Patient Diagnosis(es): The primary encounter diagnosis was General weakness. A diagnosis of Nausea was also pertinent to this visit.     Pt able to ambulate today, improved cognition.  Activity Tolerance: Patient limited by endurance     Plan    Physical Therapy Plan  General Plan: 5-7 times per week  Specific Instructions for Next Treatment: advance gait distance, instruct in exercise program  Current Treatment Recommendations: Strengthening;Equipment evaluation, education, & procurement;Endurance training;Balance training;Functional mobility training;Transfer training;Gait training;Stair training;Patient/Caregiver education & training;Safety education & training;Home exercise program;Therapeutic activities     Restrictions  Restrictions/Precautions  Restrictions/Precautions: Seizure, Fall Risk (ileostomy, port right chest, IV right forearm)  Required Braces or Orthoses?: No  Implants present? :  (denies)  Position Activity Restriction  Other position/activity restrictions: up w/ assist     Subjective    Pt more alert and oriented today, responding to questions appropriatly  Pain: denies pain    Orientation  Orientation Level: Oriented to place;Oriented to person;Oriented to time  Cognition  Overall Cognitive Status: Exceptions  Arousal/Alertness: Delayed responses to stimuli;Inconsistent responses to stimuli  Attention Span: Difficulty attending to directions  Memory: Decreased short term memory;Decreased long term memory  Safety Judgement: Decreased awareness of need for assistance;Decreased awareness of need for safety  Insights: Decreased awareness of deficits  Initiation: Requires cues for 
SPEECH LANGUAGE PATHOLOGY  Speech Language Pathology  [facility] MED SURG    Dysphagia Treatment Note    Date: 1/10/2024  Patient’s Name: Frank Lisa  MRN: [id number]  Diagnosis: Dysphagia  Patient Active Problem List   Diagnosis Code    Back pain, chronic M54.9, G89.29    Hearing difficulty H91.90    GERD (gastroesophageal reflux disease) K21.9    Cervical radicular pain M54.12    Hypomagnesemia E83.42    Chronic viral hepatitis B without delta agent and without coma (HCC) B18.1    Calculus of gallbladder without cholecystitis K80.20    Hep C w/o coma, chronic (HCC) B18.2    Fatty liver K76.0    Psychophysiologic insomnia F51.04    Cirrhosis (HCC) K74.60    Decompensation of cirrhosis of liver (HCC) K72.90, K74.60    Vertebrogenic low back pain M54.51    DDD (degenerative disc disease), lumbar M51.36    Depression F32.A    Tubular adenoma of colon D12.6    History of colon polyps Z86.010    Gynecomastia, male N62    Lumbar radiculitis M54.16    Lumbar disc herniation M51.26    Tinnitus H93.19    Eustachian tube dysfunction H69.90    Ganglion cyst M67.40    Carpal tunnel syndrome of right wrist G56.01    History of hepatitis C Z86.19    Vitamin D deficiency E55.9    Pure hypercholesterolemia E78.00    Hypokalemia E87.6    Essential hypertension I10    Recurrent major depressive disorder in partial remission (HCC) F33.41    S/P epidural steroid injection Z92.241    Elevated LFTs R79.89    Seasonal allergies J30.2    Lumbar facet arthropathy M47.816    Cervical facet syndrome M47.812    Thrombocytopenia (HCC) D69.6    Hepatitis C virus infection resolved after antiviral drug therapy Z86.19    Alcohol abuse F10.10    Altered mental status R41.82    Hypocalcemia E83.51    Hypophosphatemia E83.39    Malignant neoplasm of lower third of esophagus (HCC) C15.5    COVID-19 U07.1    Anxiety F41.9    Current smoker F17.200    Severe comorbid illness R69    Gait instability R26.81    Abnormal findings on diagnostic imaging of spine 
SPEECH LANGUAGE PATHOLOGY  Speech Language Pathology  [facility] MED SURG    Dysphagia Treatment Note    Date: 1/11/2024  Patient’s Name: Frank Lisa  MRN: [id number]  Diagnosis: Dysphagia   Patient Active Problem List   Diagnosis Code    Back pain, chronic M54.9, G89.29    Hearing difficulty H91.90    GERD (gastroesophageal reflux disease) K21.9    Cervical radicular pain M54.12    Hypomagnesemia E83.42    Chronic viral hepatitis B without delta agent and without coma (HCC) B18.1    Calculus of gallbladder without cholecystitis K80.20    Hep C w/o coma, chronic (HCC) B18.2    Fatty liver K76.0    Psychophysiologic insomnia F51.04    Cirrhosis (HCC) K74.60    Decompensation of cirrhosis of liver (HCC) K72.90, K74.60    Vertebrogenic low back pain M54.51    DDD (degenerative disc disease), lumbar M51.36    Depression F32.A    Tubular adenoma of colon D12.6    History of colon polyps Z86.010    Gynecomastia, male N62    Lumbar radiculitis M54.16    Lumbar disc herniation M51.26    Tinnitus H93.19    Eustachian tube dysfunction H69.90    Ganglion cyst M67.40    Carpal tunnel syndrome of right wrist G56.01    History of hepatitis C Z86.19    Vitamin D deficiency E55.9    Pure hypercholesterolemia E78.00    Hypokalemia E87.6    Essential hypertension I10    Recurrent major depressive disorder in partial remission (HCC) F33.41    S/P epidural steroid injection Z92.241    Elevated LFTs R79.89    Seasonal allergies J30.2    Lumbar facet arthropathy M47.816    Cervical facet syndrome M47.812    Thrombocytopenia (HCC) D69.6    Hepatitis C virus infection resolved after antiviral drug therapy Z86.19    Alcohol abuse F10.10    Altered mental status R41.82    Hypocalcemia E83.51    Hypophosphatemia E83.39    Malignant neoplasm of lower third of esophagus (HCC) C15.5    COVID-19 U07.1    Anxiety F41.9    Current smoker F17.200    Severe comorbid illness R69    Gait instability R26.81    Abnormal findings on diagnostic imaging of spine 
SPEECH LANGUAGE PATHOLOGY  Speech Language Pathology  [facility] MED SURG    Dysphagia Treatment Note    Date: 1/9/2024  Patient’s Name: Frank Lisa  MRN: [id number]  Diagnosis: Dysphagia   Patient Active Problem List   Diagnosis Code    Back pain, chronic M54.9, G89.29    Hearing difficulty H91.90    GERD (gastroesophageal reflux disease) K21.9    Cervical radicular pain M54.12    Hypomagnesemia E83.42    Chronic viral hepatitis B without delta agent and without coma (HCC) B18.1    Calculus of gallbladder without cholecystitis K80.20    Hep C w/o coma, chronic (HCC) B18.2    Fatty liver K76.0    Psychophysiologic insomnia F51.04    Cirrhosis (HCC) K74.60    Decompensation of cirrhosis of liver (HCC) K72.90, K74.60    Vertebrogenic low back pain M54.51    DDD (degenerative disc disease), lumbar M51.36    Depression F32.A    Tubular adenoma of colon D12.6    History of colon polyps Z86.010    Gynecomastia, male N62    Lumbar radiculitis M54.16    Lumbar disc herniation M51.26    Tinnitus H93.19    Eustachian tube dysfunction H69.90    Ganglion cyst M67.40    Carpal tunnel syndrome of right wrist G56.01    History of hepatitis C Z86.19    Vitamin D deficiency E55.9    Pure hypercholesterolemia E78.00    Hypokalemia E87.6    Essential hypertension I10    Recurrent major depressive disorder in partial remission (HCC) F33.41    S/P epidural steroid injection Z92.241    Elevated LFTs R79.89    Seasonal allergies J30.2    Lumbar facet arthropathy M47.816    Cervical facet syndrome M47.812    Thrombocytopenia (HCC) D69.6    Hepatitis C virus infection resolved after antiviral drug therapy Z86.19    Alcohol abuse F10.10    Altered mental status R41.82    Hypocalcemia E83.51    Hypophosphatemia E83.39    Malignant neoplasm of lower third of esophagus (HCC) C15.5    COVID-19 U07.1    Anxiety F41.9    Current smoker F17.200    Severe comorbid illness R69    Gait instability R26.81    Abnormal findings on diagnostic imaging of spine 
[facility]   OCCUPATIONAL THERAPY MISSED TREATMENT NOTE   INPATIENT   Date: 24  Patient Name: Frank Lisa       Room:   MRN: [id number]   Account #: [id number]    : 1960  (63 y.o.)  Gender: male   Referring Practitioner: Jaziel Cerda MD  Diagnosis: Intractable nausea and vomiting; Nausea; General weakness             REASON FOR MISSED TREATMENT:       Pt refusing therapy this date. Writer provided encouragement of participation however continued refusal stating that he will be discharging later today       Electronically signed by MANULEITO Bolivar on 24 at 1:42 PM EST   
Writer receives call from speech therapy regarding swallow study, they are recommending patient be NPO. Dr. Bajwa notified.  
(MAG-OX) tablet 400 mg, Daily  furosemide (LASIX) tablet 20 mg, Daily  spironolactone (ALDACTONE) tablet 25 mg, Daily        Data:     Code Status:  Full Code    Family History   Problem Relation Age of Onset    Cancer Mother         pancreatic    Cancer Father         bone    Diabetes Sister     Asthma Brother     Allergies Brother         MULTIPLE       Social History     Socioeconomic History    Marital status: Single     Spouse name: Not on file    Number of children: Not on file    Years of education: Not on file    Highest education level: Not on file   Occupational History    Not on file   Tobacco Use    Smoking status: Former     Current packs/day: 0.00     Average packs/day: 1 pack/day for 45.0 years (45.0 ttl pk-yrs)     Types: Cigarettes     Start date: 1972     Quit date: 2017     Years since quittin.9    Smokeless tobacco: Never   Vaping Use    Vaping Use: Never used   Substance and Sexual Activity    Alcohol use: Not Currently     Comment: Quit alcohol in - heavier drinking prior to quitting    Drug use: Not on file     Comment: Cocaine- stopped spring 2016    Sexual activity: Yes     Partners: Female   Other Topics Concern    Not on file   Social History Narrative     in the past, retired     Social Determinants of Health     Financial Resource Strain: Low Risk  (9/15/2023)    Overall Financial Resource Strain (CARDIA)     Difficulty of Paying Living Expenses: Not hard at all   Food Insecurity: No Food Insecurity (2024)    Hunger Vital Sign     Worried About Running Out of Food in the Last Year: Never true     Ran Out of Food in the Last Year: Never true   Transportation Needs: No Transportation Needs (2024)    PRAPARE - Transportation     Lack of Transportation (Medical): No     Lack of Transportation (Non-Medical): No   Physical Activity: Inactive (2022)    Exercise Vital Sign     Days of Exercise per Week: 0 days     Minutes of Exercise per Session: 0 min 
assistance  Gait Training  Gait Training: Yes  Gait  Overall Level of Assistance: Contact-guard assistance  Distance (ft): 50 Feet (50-60ft x 4)  Assistive Device: Gait belt;Walker, rolling  Interventions: Safety awareness training;Tactile cues;Verbal cues  Base of Support: Narrowed  Step Length: Left shortened;Right shortened  Gait Abnormalities: Decreased step clearance (forward flexed posture, ambulating out of SALVATORE.)     PT Exercises  Exercise Treatment: Pt retires to bed post ambulation and declines additional intervention.             Goals  Short Term Goals  Time Frame for Short Term Goals: 5-7 treatments/ week  Short Term Goal 1: pt to tolerate 1/2 hour of therapuetic exercise and activity  Short Term Goal 2: pt to demonstrate improved strength by 1/2 MMG bilateral LEs  Short Term Goal 3: pt to demonstrate good technique for HEP for general strengthening, energy conservation and balance activities  Short Term Goal 4: pt to demonstrate independent transfers  Short Term Goal 5: pt to demonstrate independent gait 150' for community distances  Short Term Goal 6: pt to demonstrate good ambulatory balance  Short Term Goal 7: pt to demonstrate ability to ascend/ descend 2 steps w/ right rail w/ SBA  for home entry    Education  Patient Education  Education Given To: Patient  Education Provided: Role of Therapy;Fall Prevention Strategies;Transfer Training;Energy Conservation  Education Method: Verbal  Barriers to Learning: Cognition  Education Outcome: Continued education needed    AM-PAC - Mobility    AM-PAC Basic Mobility - Inpatient   How much help is needed turning from your back to your side while in a flat bed without using bedrails?: A Little  How much help is needed moving from lying on your back to sitting on the side of a flat bed without using bedrails?: A Little  How much help is needed moving to and from a bed to a chair?: A Little  How much help is needed standing up from a chair using your arms?: A 
  Phone [phone]   Fax: [phone]  Answering Service: [phone]   
Inpatient   How much help is needed turning from your back to your side while in a flat bed without using bedrails?: A Little  How much help is needed moving from lying on your back to sitting on the side of a flat bed without using bedrails?: A Little  How much help is needed moving to and from a bed to a chair?: A Little  How much help is needed standing up from a chair using your arms?: A Little  How much help is needed walking in hospital room?: A Little  How much help is needed climbing 3-5 steps with a railing?: A Little  AM-PAC Inpatient Mobility Raw Score : 18  AM-PAC Inpatient T-Scale Score : 43.63  Mobility Inpatient CMS 0-100% Score: 46.58  Mobility Inpatient CMS G-Code Modifier : CK         Therapy Time   Individual Concurrent Group Co-treatment   Time In 0825         Time Out 0845         Minutes 20                 Joceylne Cornejo, PTA           
demonstrate independent gait 150' for community distances  Short Term Goal 6: pt to demonstrate good ambulatory balance  Short Term Goal 7: pt to demonstrate ability to ascend/ descend 2 steps w/ right rail w/ SBA  for home entry       Education  Patient Education  Education Given To: Patient  Education Provided: Role of Therapy;Plan of Care  Education Method: Verbal  Barriers to Learning: Cognition  Education Outcome: Continued education needed      Therapy Time   Individual Concurrent Group Co-treatment   Time In (P) 1515         Time Out (P) 1532         Minutes (P) 17                 Jocelyne Mathew, PTA         
Patient  Education Provided: Role of Therapy;Plan of Care  Education Method: Demonstration;Verbal  Barriers to Learning: Cognition  Education Outcome: Continued education needed      Therapy Time   Individual Concurrent Group Co-treatment   Time In 1346         Time Out 1411         Minutes 25         Timed Code Treatment Minutes: 8 Minutes       Emmie Steele, PT         
with urination, frequency   INTEGUMENT:  negative for rash, skin lesions, easy bruising   HEMATOLOGIC/LYMPHATIC:  negative for swelling/edema   ALLERGIC/IMMUNOLOGIC:  negative for urticaria , itching  ENDOCRINE:  negative increase in drinking, increase in urination, hot or cold intolerance  MUSCULOSKELETAL:  negative joint pains, muscle aches, swelling of joints  NEUROLOGICAL: Generalized weakness unable to get around  Physical Exam:     /66   Pulse 86   Temp 97.2 °F (36.2 °C)   Resp 18   Ht 1.778 m (5' 10\")   Wt 84 kg (185 lb 3 oz)   SpO2 98%   BMI 26.57 kg/m²   Temp (24hrs), Av.1 °F (36.7 °C), Min:97.2 °F (36.2 °C), Max:99 °F (37.2 °C)    No results for input(s): \"POCGLU\" in the last 72 hours.  No intake or output data in the 24 hours ending 24 1047      General Appearance:  alert, well appearing, and in no acute distress  Mental status: oriented to person, place, and time   Head:  normocephalic, atraumatic.  Neck: supple, no carotid bruits, thyroid not palpable  Lungs: Bilateral equal air entry, clear to ausculation, no wheezing, rales or rhonchi, normal effort  Cardiovascular: normal rate, regular rhythm, no murmur, gallop, rub.  Abdomen: Soft, nontender, nondistended, normal bowel sounds, no hepatomegaly or splenomegaly  Neurologic: There are no new focal motor or sensory deficits, moving all extremities spontaneously   Skin: No gross lesions, rashes, bruising or bleeding on exposed skin area  Extremities:  peripheral pulses palpable, no pedal edema or calf pain with palpation  Flat affect    Investigations:      Laboratory Testing:  Recent Results (from the past 24 hour(s))   Hepatic Function Panel    Collection Time: 24  6:29 AM   Result Value Ref Range    Albumin 2.4 (L) 3.5 - 5.2 g/dL    Alkaline Phosphatase 299 (H) 40 - 129 U/L    ALT 17 5 - 41 U/L    AST 52 (H) <40 U/L    Total Bilirubin 2.6 (H) 0.3 - 1.2 mg/dL    Bilirubin, Direct 1.3 (H) <0.3 mg/dL    Bilirubin, Indirect 1.3

## 2024-01-11 NOTE — CARE COORDINATION
Writer met with pt to discuss discharge plans.  Pt requested writer to call pt contact Nilam to confirm discharge plans.  Writer placed call to Nilam to confirm discharge time.  No further questions at this time.  Electronically signed by LYNDSEY Person on 1/11/2024 at 2:41 PM

## 2024-01-11 NOTE — PLAN OF CARE
Problem: Discharge Planning  Goal: Discharge to home or other facility with appropriate resources  1/10/2024 0321 by Suzan Tovar, RN  Outcome: Progressing  Flowsheets (Taken 1/9/2024 1946)  Discharge to home or other facility with appropriate resources: Identify barriers to discharge with patient and caregiver     Problem: Pain  Goal: Verbalizes/displays adequate comfort level or baseline comfort level  1/10/2024 0321 by Suzan Tovar, RN  Outcome: Progressing     Problem: Nutrition Deficit:  Goal: Optimize nutritional status  1/10/2024 0321 by Suzan Tovar RN  Outcome: Progressing  Flowsheets (Taken 1/8/2024 1517 by Angie Enamorado, DARREN, LD)  Nutrient intake appropriate for improving, restoring, or maintaining nutritional needs: Recommend, monitor, and adjust tube feedings and TPN/PPN based on assessed needs     Problem: ABCDS Injury Assessment  Goal: Absence of physical injury  Recent Flowsheet Documentation  Taken 1/10/2024 0104 by Suzan Tovar, RN  Absence of Physical Injury: Implement safety measures based on patient assessment     
  Problem: Discharge Planning  Goal: Discharge to home or other facility with appropriate resources  1/10/2024 1314 by Veronica Piper, RN  Outcome: Progressing     Problem: Pain  Goal: Verbalizes/displays adequate comfort level or baseline comfort level  1/10/2024 1314 by Veronica Piper RN  Outcome: Progressing     Problem: Safety - Adult  Goal: Free from fall injury  Outcome: Progressing  Flowsheets (Taken 1/10/2024 0104 by Suzan Tovar, RN)  Free From Fall Injury: Instruct family/caregiver on patient safety     Problem: Skin/Tissue Integrity  Goal: Absence of new skin breakdown  Outcome: Progressing     Problem: Nutrition Deficit:  Goal: Optimize nutritional status  1/10/2024 1314 by Veronica Piper RN  Outcome: Progressing     Problem: ABCDS Injury Assessment  Goal: Absence of physical injury  Outcome: Progressing  Flowsheets (Taken 1/10/2024 0104 by Suzan Tovar, RN)  Absence of Physical Injury: Implement safety measures based on patient assessment     
  Problem: Discharge Planning  Goal: Discharge to home or other facility with appropriate resources  1/11/2024 0112 by Lilo Hopkins RN  Outcome: Progressing  Flowsheets (Taken 1/10/2024 2100)  Discharge to home or other facility with appropriate resources: Identify barriers to discharge with patient and caregiver     Problem: Pain  Goal: Verbalizes/displays adequate comfort level or baseline comfort level  1/11/2024 0112 by Lilo Hopkins RN  Outcome: Progressing  Flowsheets (Taken 1/10/2024 2352)  Verbalizes/displays adequate comfort level or baseline comfort level:   Encourage patient to monitor pain and request assistance   Assess pain using appropriate pain scale   Administer analgesics based on type and severity of pain and evaluate response   Implement non-pharmacological measures as appropriate and evaluate response     Problem: Safety - Adult  Goal: Free from fall injury  1/11/2024 0112 by Lilo Hopkins RN  Outcome: Progressing  Free from falls throughout shift. Pt is compliant with safety measures in place. Pt seen by care team every hour with purposefully hourly rounding, bed is in the lowest position, call light and personal belongings within reach. Two side rails are up and bed alarm is on.        Problem: Skin/Tissue Integrity  Goal: Absence of new skin breakdown  Description: 1.  Monitor for areas of redness and/or skin breakdown  2.  Assess vascular access sites hourly  3.  Every 4-6 hours minimum:  Change oxygen saturation probe site  4.  Every 4-6 hours:  If on nasal continuous positive airway pressure, respiratory therapy assess nares and determine need for appliance change or resting period.  1/11/2024 0112 by Lilo Hopkins RN  Outcome: Progressing     Problem: Nutrition Deficit:  Goal: Optimize nutritional status  1/11/2024 0112 by Lilo Hopkins RN  Outcome: Progressing  Flowsheets (Taken 1/10/2024 1452 by Angie Enamorado, RD, LD)  Nutrient intake appropriate for improving, restoring, or 
  Problem: Discharge Planning  Goal: Discharge to home or other facility with appropriate resources  1/11/2024 1713 by Veronica Piper RN  Outcome: Completed     Problem: Pain  Goal: Verbalizes/displays adequate comfort level or baseline comfort level  1/11/2024 1713 by Veronica Piper RN  Outcome: Completed     Problem: Safety - Adult  Goal: Free from fall injury  1/11/2024 1713 by Veronica Piper, RN  Outcome: Completed     Problem: Skin/Tissue Integrity  Goal: Absence of new skin breakdown  1/11/2024 1713 by Veronica Piper, RN  Outcome: Completed     Problem: Nutrition Deficit:  Goal: Optimize nutritional status  1/11/2024 1713 by Veronica Piper RN  Outcome: Completed     Problem: ABCDS Injury Assessment  Goal: Absence of physical injury  1/11/2024 1713 by Veronica Piper, RN  Outcome: Completed     
  Problem: Discharge Planning  Goal: Discharge to home or other facility with appropriate resources  1/5/2024 0307 by Munira Hart RN  Outcome: Progressing  Flowsheets (Taken 1/4/2024 2024)  Discharge to home or other facility with appropriate resources:   Identify barriers to discharge with patient and caregiver   Arrange for needed discharge resources and transportation as appropriate   Identify discharge learning needs (meds, wound care, etc)   Refer to discharge planning if patient needs post-hospital services based on physician order or complex needs related to functional status, cognitive ability or social support system     Problem: Pain  Goal: Verbalizes/displays adequate comfort level or baseline comfort level  1/5/2024 0307 by Munira Hart RN  Outcome: Progressing     Problem: Safety - Adult  Goal: Free from fall injury  1/5/2024 0307 by Munira Hart RN  Outcome: Progressing  Flowsheets (Taken 1/5/2024 0042)  Free From Fall Injury:   Instruct family/caregiver on patient safety   Based on caregiver fall risk screen, instruct family/caregiver to ask for assistance with transferring infant if caregiver noted to have fall risk factors     Problem: Skin/Tissue Integrity  Goal: Absence of new skin breakdown  Description: 1.  Monitor for areas of redness and/or skin breakdown  2.  Assess vascular access sites hourly  3.  Every 4-6 hours minimum:  Change oxygen saturation probe site  4.  Every 4-6 hours:  If on nasal continuous positive airway pressure, respiratory therapy assess nares and determine need for appliance change or resting period.  1/5/2024 0307 by Munira Hart RN  Outcome: Progressing     Problem: Nutrition Deficit:  Goal: Optimize nutritional status  Outcome: Progressing     
  Problem: Discharge Planning  Goal: Discharge to home or other facility with appropriate resources  1/5/2024 1505 by Jocelyn German, RN  Outcome: Progressing     Problem: Pain  Goal: Verbalizes/displays adequate comfort level or baseline comfort level  1/5/2024 1505 by Jocelyn German, RN  Outcome: Progressing     Problem: Safety - Adult  Goal: Free from fall injury  1/5/2024 1505 by Jocelyn German, RN  Outcome: Progressing     Problem: Skin/Tissue Integrity  Goal: Absence of new skin breakdown  Description: 1.  Monitor for areas of redness and/or skin breakdown  2.  Assess vascular access sites hourly  3.  Every 4-6 hours minimum:  Change oxygen saturation probe site  4.  Every 4-6 hours:  If on nasal continuous positive airway pressure, respiratory therapy assess nares and determine need for appliance change or resting period.  1/5/2024 1505 by Jocelyn German, RN  Outcome: Progressing     Problem: Nutrition Deficit:  Goal: Optimize nutritional status  1/5/2024 1505 by Jocelyn German, RN  Outcome: Progressing     
  Problem: Discharge Planning  Goal: Discharge to home or other facility with appropriate resources  1/6/2024 0341 by Munira Hart RN  Outcome: Progressing     Problem: Pain  Goal: Verbalizes/displays adequate comfort level or baseline comfort level  1/6/2024 0341 by Munira Hart RN  Outcome: Progressing     Problem: Safety - Adult  Goal: Free from fall injury  1/6/2024 0341 by Munira Hart RN  Outcome: Progressing  Flowsheets (Taken 1/6/2024 0340)  Free From Fall Injury:   Instruct family/caregiver on patient safety   Based on caregiver fall risk screen, instruct family/caregiver to ask for assistance with transferring infant if caregiver noted to have fall risk factors     Problem: Skin/Tissue Integrity  Goal: Absence of new skin breakdown  Description: 1.  Monitor for areas of redness and/or skin breakdown  2.  Assess vascular access sites hourly  3.  Every 4-6 hours minimum:  Change oxygen saturation probe site  4.  Every 4-6 hours:  If on nasal continuous positive airway pressure, respiratory therapy assess nares and determine need for appliance change or resting period.  1/6/2024 0341 by Munira Hart RN  Outcome: Progressing     Problem: Nutrition Deficit:  Goal: Optimize nutritional status  1/6/2024 0341 by Munira Hart RN  Outcome: Progressing     
  Problem: Discharge Planning  Goal: Discharge to home or other facility with appropriate resources  1/6/2024 1713 by Anila Salinas RN  Outcome: Progressing  Flowsheets (Taken 1/6/2024 0519 by Munira Hart RN)  Discharge to home or other facility with appropriate resources:   Arrange for needed discharge resources and transportation as appropriate   Identify barriers to discharge with patient and caregiver   Identify discharge learning needs (meds, wound care, etc)   Refer to discharge planning if patient needs post-hospital services based on physician order or complex needs related to functional status, cognitive ability or social support system  1/6/2024 0341 by Munira Hart RN  Outcome: Progressing     Problem: Pain  Goal: Verbalizes/displays adequate comfort level or baseline comfort level  1/6/2024 1713 by Anila Salinas RN  Outcome: Progressing  1/6/2024 0341 by Munira Hart RN  Outcome: Progressing     Problem: Safety - Adult  Goal: Free from fall injury  1/6/2024 1713 by Anila Salinas RN  Outcome: Progressing  1/6/2024 0341 by Munira Hart RN  Outcome: Progressing  Flowsheets (Taken 1/6/2024 0340)  Free From Fall Injury:   Instruct family/caregiver on patient safety   Based on caregiver fall risk screen, instruct family/caregiver to ask for assistance with transferring infant if caregiver noted to have fall risk factors     Problem: Skin/Tissue Integrity  Goal: Absence of new skin breakdown  Description: 1.  Monitor for areas of redness and/or skin breakdown  2.  Assess vascular access sites hourly  3.  Every 4-6 hours minimum:  Change oxygen saturation probe site  4.  Every 4-6 hours:  If on nasal continuous positive airway pressure, respiratory therapy assess nares and determine need for appliance change or resting period.  1/6/2024 1713 by Anila Salinas RN  Outcome: Progressing  1/6/2024 0341 by Munira Hart RN  Outcome: Progressing     Problem: Nutrition Deficit:  Goal: Optimize 
  Problem: Discharge Planning  Goal: Discharge to home or other facility with appropriate resources  1/8/2024 0215 by Vandana Smith RN  Outcome: Progressing  Flowsheets (Taken 1/8/2024 0215)  Discharge to home or other facility with appropriate resources: Identify barriers to discharge with patient and caregiver  1/7/2024 1806 by Jeana Dickerson RN  Outcome: Progressing     Problem: Pain  Goal: Verbalizes/displays adequate comfort level or baseline comfort level  1/8/2024 0215 by Vandana Smith RN  Outcome: Progressing  Flowsheets (Taken 1/8/2024 0215)  Verbalizes/displays adequate comfort level or baseline comfort level:   Encourage patient to monitor pain and request assistance   Assess pain using appropriate pain scale   Administer analgesics based on type and severity of pain and evaluate response   Implement non-pharmacological measures as appropriate and evaluate response  1/7/2024 1806 by Jeana Dickerson RN  Outcome: Progressing     Problem: Safety - Adult  Goal: Free from fall injury  Outcome: Progressing  Flowsheets (Taken 1/8/2024 0215)  Free From Fall Injury: Instruct family/caregiver on patient safety     Problem: Skin/Tissue Integrity  Goal: Absence of new skin breakdown  Description: 1.  Monitor for areas of redness and/or skin breakdown  2.  Assess vascular access sites hourly  3.  Every 4-6 hours minimum:  Change oxygen saturation probe site  4.  Every 4-6 hours:  If on nasal continuous positive airway pressure, respiratory therapy assess nares and determine need for appliance change or resting period.  Outcome: Progressing     Problem: Nutrition Deficit:  Goal: Optimize nutritional status  1/8/2024 0215 by Vandana Smith RN  Outcome: Progressing  1/7/2024 1806 by Jeana Dickerson RN  Outcome: Progressing     Problem: ABCDS Injury Assessment  Goal: Absence of physical injury  Outcome: Progressing     
  Problem: Discharge Planning  Goal: Discharge to home or other facility with appropriate resources  Outcome: Progressing     Problem: Pain  Goal: Verbalizes/displays adequate comfort level or baseline comfort level  Outcome: Progressing     Problem: Nutrition Deficit:  Goal: Optimize nutritional status  Outcome: Progressing     
  Problem: Discharge Planning  Goal: Discharge to home or other facility with appropriate resources  Outcome: Progressing     Problem: Pain  Goal: Verbalizes/displays adequate comfort level or baseline comfort level  Outcome: Progressing     Problem: Safety - Adult  Goal: Free from fall injury  Outcome: Progressing     Problem: Skin/Tissue Integrity  Goal: Absence of new skin breakdown  Outcome: Progressing     
  Problem: Discharge Planning  Goal: Discharge to home or other facility with appropriate resources  Outcome: Progressing  Flowsheets (Taken 1/3/2024 0323)  Discharge to home or other facility with appropriate resources:   Identify barriers to discharge with patient and caregiver   Arrange for needed discharge resources and transportation as appropriate   Identify discharge learning needs (meds, wound care, etc)   Arrange for interpreters to assist at discharge as needed   Refer to discharge planning if patient needs post-hospital services based on physician order or complex needs related to functional status, cognitive ability or social support system     Problem: Pain  Goal: Verbalizes/displays adequate comfort level or baseline comfort level  Outcome: Progressing  Flowsheets (Taken 1/3/2024 0323)  Verbalizes/displays adequate comfort level or baseline comfort level:   Encourage patient to monitor pain and request assistance   Assess pain using appropriate pain scale   Administer analgesics based on type and severity of pain and evaluate response   Implement non-pharmacological measures as appropriate and evaluate response   Consider cultural and social influences on pain and pain management   Notify Licensed Independent Practitioner if interventions unsuccessful or patient reports new pain     Problem: Safety - Adult  Goal: Free from fall injury  Outcome: Progressing  Flowsheets (Taken 1/3/2024 0323)  Free From Fall Injury: Instruct family/caregiver on patient safety     
  Problem: Discharge Planning  Goal: Discharge to home or other facility with appropriate resources  Outcome: Progressing  Flowsheets (Taken 1/9/2024 1044)  Discharge to home or other facility with appropriate resources:   Identify barriers to discharge with patient and caregiver   Arrange for needed discharge resources and transportation as appropriate     Problem: Pain  Goal: Verbalizes/displays adequate comfort level or baseline comfort level  Outcome: Progressing     Problem: Safety - Adult  Goal: Free from fall injury  Outcome: Progressing     Problem: Skin/Tissue Integrity  Goal: Absence of new skin breakdown  Description: 1.  Monitor for areas of redness and/or skin breakdown  2.  Assess vascular access sites hourly  3.  Every 4-6 hours minimum:  Change oxygen saturation probe site  4.  Every 4-6 hours:  If on nasal continuous positive airway pressure, respiratory therapy assess nares and determine need for appliance change or resting period.  Outcome: Progressing     Problem: Nutrition Deficit:  Goal: Optimize nutritional status  Outcome: Progressing     
  Problem: Pain  Goal: Verbalizes/displays adequate comfort level or baseline comfort level  1/6/2024 1935 by Rahel Higgins, RN  Outcome: Progressing     Problem: Safety - Adult  Goal: Free from fall injury  1/6/2024 1935 by Rahel Higgins, RN  Outcome: Progressing     Problem: Skin/Tissue Integrity  Goal: Absence of new skin breakdown  Description: 1.  Monitor for areas of redness and/or skin breakdown  2.  Assess vascular access sites hourly  3.  Every 4-6 hours minimum:  Change oxygen saturation probe site  4.  Every 4-6 hours:  If on nasal continuous positive airway pressure, respiratory therapy assess nares and determine need for appliance change or resting period.  1/6/2024 1935 by Rahel Higgins, RN  Outcome: Progressing     Problem: Nutrition Deficit:  Goal: Optimize nutritional status  1/6/2024 1935 by Rahel Higgins, RN  Outcome: Progressing     
  Problem: Pain  Goal: Verbalizes/displays adequate comfort level or baseline comfort level  Outcome: Progressing  Note: Assess the location, characteristics, onset, duration, frequency, quality, and severity of pain. Encourage immediate report of pain. Use appropriate pain scale to rate pain. Manage pain using nonpharmacologic/pharmacologic interventions.      Problem: Safety - Adult  Goal: Free from fall injury  Outcome: Progressing  Note: Patient remains free of falls and injuries throughout shift. Bed remains in the lowest position, wheels locked, call light and bedside table are within reach.      
ABCDS Injury Assessment  Goal: Absence of physical injury  Outcome: Progressing  Flowsheets (Taken 1/9/2024 0154)  Absence of Physical Injury: Implement safety measures based on patient assessment

## 2024-01-11 NOTE — CARE COORDINATION
Transportation arranged for patient to go to [facility] at 1630 via [facility] by wheelchair. Facility informed. Bedside nurse informed.     Number for Report:  [phone]  Electronically signed by LYNDSEY Person on 1/11/2024 at 2:38 PM

## 2024-01-11 NOTE — CARE COORDINATION
Writer spoke to Graciela with [facility].  Facility received authorization on this pt. Authorization valid 1/11-1/15.  Writer placed call to bedside JERI Max regarding this.  Electronically signed by LYNDSEY Person on 1/11/2024 at 10:43 AM

## 2024-01-11 NOTE — CARE COORDINATION
IMM letter provided to patient.  Patient offered four hours to make informed decision regarding appeal process; patient agreeable to discharge.     Electronically signed by LYNDSEY Person on 1/11/2024 at 2:39 PM

## 2024-01-11 NOTE — DISCHARGE INSTRUCTIONS
Continue physical therapy occupational therapy and speech therapy  Patient is n.p.o. by mouth  Continue tube feeds via PEG, PEG site care as per guidelines.  Continue binder at nighttime.  Tube feed diet Osmolite 1.5 at 50 cc an hour, 30 mL flushes every 6 hours.

## 2024-01-12 ENCOUNTER — TELEPHONE (OUTPATIENT)
Dept: ORTHOPEDIC SURGERY | Age: 64
End: 2024-01-12

## 2024-01-12 NOTE — TELEPHONE ENCOUNTER
Nilam called stating patient was discharged from the hospital and is currently in rehab @ [facility]. She is asking to cancel his surgery at this time.    Please return her call if any questions/concerns to [phone].    Thank you.

## 2024-01-15 ENCOUNTER — HOSPITAL ENCOUNTER (OUTPATIENT)
Age: 64
Setting detail: SPECIMEN
Discharge: HOME | End: 2024-01-15

## 2024-01-15 LAB
ANION GAP SERPL CALCULATED.3IONS-SCNC: 8 MMOL/L (ref 9–16)
BUN SERPL-MCNC: 7 MG/DL (ref 8–23)
CALCIUM SERPL-MCNC: 8.2 MG/DL (ref 8.6–10.4)
CHLORIDE SERPL-SCNC: 107 MMOL/L (ref 98–107)
CO2 SERPL-SCNC: 24 MMOL/L (ref 20–31)
CREAT SERPL-MCNC: 0.7 MG/DL (ref 0.7–1.2)
ERYTHROCYTE [DISTWIDTH] IN BLOOD BY AUTOMATED COUNT: 13.8 % (ref 11.8–14.4)
GFR SERPL CREATININE-BSD FRML MDRD: >60 ML/MIN/1.73M2
GLUCOSE SERPL-MCNC: 76 MG/DL (ref 74–99)
HCT VFR BLD AUTO: 30.2 % (ref 40.7–50.3)
HGB BLD-MCNC: 9.6 G/DL (ref 13–17)
MCH RBC QN AUTO: 32.7 PG (ref 25.2–33.5)
MCHC RBC AUTO-ENTMCNC: 31.8 G/DL (ref 28.4–34.8)
MCV RBC AUTO: 102.7 FL (ref 82.6–102.9)
NRBC BLD-RTO: 0 PER 100 WBC
PLATELET # BLD AUTO: 246 K/UL (ref 138–453)
PMV BLD AUTO: 10.5 FL (ref 8.1–13.5)
POTASSIUM SERPL-SCNC: 3.7 MMOL/L (ref 3.7–5.3)
RBC # BLD AUTO: 2.94 M/UL (ref 4.21–5.77)
SODIUM SERPL-SCNC: 139 MMOL/L (ref 136–145)
WBC OTHER # BLD: 7.5 K/UL (ref 3.5–11.3)

## 2024-01-15 PROCEDURE — 85027 COMPLETE CBC AUTOMATED: CPT

## 2024-01-15 PROCEDURE — 36415 COLL VENOUS BLD VENIPUNCTURE: CPT

## 2024-01-15 PROCEDURE — P9603 ONE-WAY ALLOW PRORATED MILES: HCPCS

## 2024-01-15 PROCEDURE — 80048 BASIC METABOLIC PNL TOTAL CA: CPT

## 2024-01-17 ENCOUNTER — HOSPITAL ENCOUNTER (OUTPATIENT)
Dept: PREADMISSION TESTING | Age: 64
Discharge: HOME | End: 2024-01-17
Attending: ORTHOPAEDIC SURGERY | Admitting: ORTHOPAEDIC SURGERY

## 2024-01-18 ENCOUNTER — OFFICE VISIT (OUTPATIENT)
Dept: GASTROENTEROLOGY | Age: 64
End: 2024-01-18
Payer: COMMERCIAL

## 2024-01-18 VITALS
DIASTOLIC BLOOD PRESSURE: 60 MMHG | HEART RATE: 65 BPM | BODY MASS INDEX: 25.05 KG/M2 | SYSTOLIC BLOOD PRESSURE: 119 MMHG | WEIGHT: 175 LBS | HEIGHT: 70 IN

## 2024-01-18 DIAGNOSIS — K76.6 PORTAL HYPERTENSION (HCC): ICD-10-CM

## 2024-01-18 DIAGNOSIS — K70.31 ALCOHOLIC CIRRHOSIS OF LIVER WITH ASCITES (HCC): ICD-10-CM

## 2024-01-18 DIAGNOSIS — C15.5 MALIGNANT NEOPLASM OF LOWER THIRD OF ESOPHAGUS (HCC): Primary | ICD-10-CM

## 2024-01-18 DIAGNOSIS — Z93.1 S/P PERCUTANEOUS ENDOSCOPIC GASTROSTOMY (PEG) TUBE PLACEMENT (HCC): ICD-10-CM

## 2024-01-18 PROCEDURE — 99214 OFFICE O/P EST MOD 30 MIN: CPT | Performed by: NURSE PRACTITIONER

## 2024-01-18 PROCEDURE — 3074F SYST BP LT 130 MM HG: CPT | Performed by: NURSE PRACTITIONER

## 2024-01-18 PROCEDURE — 3078F DIAST BP <80 MM HG: CPT | Performed by: NURSE PRACTITIONER

## 2024-01-18 NOTE — PROGRESS NOTES
definitively intraluminal nature (series 2 images 52 and 50).  There is also slight thickening of the adjacent anterior loop of transverse colon. Oral contrast is noted within the surrounding loops of bowel, without evidence of extravasation. Pelvis: There is circumferential bladder wall thickening.  Normal-sized prostate gland. Peritoneum/Retroperitoneum: There is no evidence of intraperitoneal free air or ascites.  There is no suspicious lymphadenopathy.  There is diffuse aortoiliac atherosclerosis. Bones/Soft Tissues: No acute osseous abnormality is identified.  There is chronic loss of vertebral body height at L1, unchanged.  Multilevel degenerative changes of the thoracolumbar spine are noted.  Postoperative changes from previous right abdominal wall ileostomy is noted.     1. Inflammatory stranding/fluid noted about the anterior midline ileocolonic anastomotic site, nonspecific in nature.  Anastomotic malfunction (i.e. Dehiscence, leak) may be considered as there are two small anterior foci of air which are not definitively intraluminal nature.  However, there is no evidence of contrast extravasation.  Consider follow-up barium study for further evaluation. 2. Cirrhosis, with tips in place. 3. Cholelithiasis.            IMPRESSION: Mr. Lisa is a 63 y.o. male with    Diagnosis Orders   1. Malignant neoplasm of lower third of esophagus (HCC)        2. Alcoholic cirrhosis of liver with ascites (HCC)        3. Portal hypertension (HCC)        4. S/P percutaneous endoscopic gastrostomy (PEG) tube placement (HCC)          Clinically doing well  Continue diuretics as ordered  CBC, CMP weekly  To continue TF as ordered  Continue ST, PT, OT  Will reevaluate in 6 weeks    Thank you for allowing me to participate in the care of Mr. Lisa. For any further questions please do not hesitate to contact me.    I have reviewed and agree with the ROS entered by the MA/VERONIQUEN.         ANNA Recio

## 2024-01-22 ENCOUNTER — HOSPITAL ENCOUNTER (OUTPATIENT)
Age: 64
Setting detail: SPECIMEN
Discharge: HOME | End: 2024-01-22

## 2024-01-22 LAB
ALBUMIN SERPL-MCNC: 2.3 G/DL (ref 3.5–5.2)
ALBUMIN/GLOB SERPL: 1 {RATIO} (ref 1–2.5)
ALP SERPL-CCNC: 131 U/L (ref 40–129)
ALT SERPL-CCNC: 6 U/L (ref 10–50)
ANION GAP SERPL CALCULATED.3IONS-SCNC: 8 MMOL/L (ref 9–16)
AST SERPL-CCNC: 47 U/L (ref 10–50)
BILIRUB SERPL-MCNC: 1 MG/DL (ref 0–1.2)
BUN SERPL-MCNC: 11 MG/DL (ref 8–23)
CALCIUM SERPL-MCNC: 8.6 MG/DL (ref 8.6–10.4)
CHLORIDE SERPL-SCNC: 103 MMOL/L (ref 98–107)
CO2 SERPL-SCNC: 27 MMOL/L (ref 20–31)
CREAT SERPL-MCNC: 0.9 MG/DL (ref 0.7–1.2)
ERYTHROCYTE [DISTWIDTH] IN BLOOD BY AUTOMATED COUNT: 13.1 % (ref 11.8–14.4)
GFR SERPL CREATININE-BSD FRML MDRD: >60 ML/MIN/1.73M2
GLUCOSE SERPL-MCNC: 80 MG/DL (ref 74–99)
HCT VFR BLD AUTO: 32.4 % (ref 40.7–50.3)
HGB BLD-MCNC: 10.4 G/DL (ref 13–17)
MCH RBC QN AUTO: 32.1 PG (ref 25.2–33.5)
MCHC RBC AUTO-ENTMCNC: 32.1 G/DL (ref 28.4–34.8)
MCV RBC AUTO: 100 FL (ref 82.6–102.9)
NRBC BLD-RTO: 0 PER 100 WBC
PLATELET # BLD AUTO: 227 K/UL (ref 138–453)
PMV BLD AUTO: 11.3 FL (ref 8.1–13.5)
POTASSIUM SERPL-SCNC: 4.1 MMOL/L (ref 3.7–5.3)
PROT SERPL-MCNC: 5.4 G/DL (ref 6.6–8.7)
RBC # BLD AUTO: 3.24 M/UL (ref 4.21–5.77)
SODIUM SERPL-SCNC: 138 MMOL/L (ref 136–145)
WBC OTHER # BLD: 8.1 K/UL (ref 3.5–11.3)

## 2024-01-22 PROCEDURE — 80053 COMPREHEN METABOLIC PANEL: CPT

## 2024-01-22 PROCEDURE — 36415 COLL VENOUS BLD VENIPUNCTURE: CPT

## 2024-01-22 PROCEDURE — 85027 COMPLETE CBC AUTOMATED: CPT

## 2024-01-22 PROCEDURE — P9603 ONE-WAY ALLOW PRORATED MILES: HCPCS

## 2024-01-29 ENCOUNTER — HOSPITAL ENCOUNTER (OUTPATIENT)
Age: 64
Setting detail: SPECIMEN
Discharge: HOME | End: 2024-01-29

## 2024-01-29 LAB
ALBUMIN SERPL-MCNC: 2.2 G/DL (ref 3.5–5.2)
ALBUMIN/GLOB SERPL: 0.8 {RATIO} (ref 1–2.5)
ALP SERPL-CCNC: 104 U/L (ref 40–129)
ALT SERPL-CCNC: 10 U/L (ref 5–41)
ANION GAP SERPL CALCULATED.3IONS-SCNC: 7 MMOL/L (ref 9–17)
AST SERPL-CCNC: 42 U/L
BILIRUB SERPL-MCNC: 1 MG/DL (ref 0.3–1.2)
BUN SERPL-MCNC: 10 MG/DL (ref 8–23)
CALCIUM SERPL-MCNC: 8.4 MG/DL (ref 8.6–10.4)
CHLORIDE SERPL-SCNC: 105 MMOL/L (ref 98–107)
CO2 SERPL-SCNC: 25 MMOL/L (ref 20–31)
CREAT SERPL-MCNC: 0.8 MG/DL (ref 0.7–1.2)
ERYTHROCYTE [DISTWIDTH] IN BLOOD BY AUTOMATED COUNT: 12.9 % (ref 11.8–14.4)
GFR SERPL CREATININE-BSD FRML MDRD: >60 ML/MIN/1.73M2
GLUCOSE SERPL-MCNC: 84 MG/DL (ref 70–99)
HCT VFR BLD AUTO: 30.3 % (ref 40.7–50.3)
HGB BLD-MCNC: 9.7 G/DL (ref 13–17)
MCH RBC QN AUTO: 31.5 PG (ref 25.2–33.5)
MCHC RBC AUTO-ENTMCNC: 32 G/DL (ref 28.4–34.8)
MCV RBC AUTO: 98.4 FL (ref 82.6–102.9)
NRBC BLD-RTO: 0 PER 100 WBC
PLATELET # BLD AUTO: ABNORMAL K/UL (ref 138–453)
PLATELET, FLUORESCENCE: 152 K/UL (ref 138–453)
PLATELETS.RETICULATED NFR BLD AUTO: 6.4 % (ref 1.1–10.3)
POTASSIUM SERPL-SCNC: 4.4 MMOL/L (ref 3.7–5.3)
PROT SERPL-MCNC: 5 G/DL (ref 6.4–8.3)
RBC # BLD AUTO: 3.08 M/UL (ref 4.21–5.77)
SODIUM SERPL-SCNC: 137 MMOL/L (ref 135–144)
WBC OTHER # BLD: 7.4 K/UL (ref 3.5–11.3)

## 2024-01-29 PROCEDURE — P9603 ONE-WAY ALLOW PRORATED MILES: HCPCS

## 2024-01-29 PROCEDURE — 85027 COMPLETE CBC AUTOMATED: CPT

## 2024-01-29 PROCEDURE — 85055 RETICULATED PLATELET ASSAY: CPT

## 2024-01-29 PROCEDURE — 80053 COMPREHEN METABOLIC PANEL: CPT

## 2024-01-29 PROCEDURE — 36415 COLL VENOUS BLD VENIPUNCTURE: CPT

## 2024-01-30 ENCOUNTER — TELEPHONE (OUTPATIENT)
Dept: ONCOLOGY | Age: 64
End: 2024-01-30

## 2024-01-30 ENCOUNTER — OFFICE VISIT (OUTPATIENT)
Dept: ONCOLOGY | Age: 64
End: 2024-01-30
Payer: COMMERCIAL

## 2024-01-30 VITALS
HEART RATE: 66 BPM | DIASTOLIC BLOOD PRESSURE: 73 MMHG | SYSTOLIC BLOOD PRESSURE: 124 MMHG | TEMPERATURE: 97.5 F | WEIGHT: 190.8 LBS | BODY MASS INDEX: 27.38 KG/M2

## 2024-01-30 DIAGNOSIS — C15.5 MALIGNANT NEOPLASM OF LOWER THIRD OF ESOPHAGUS (HCC): Primary | ICD-10-CM

## 2024-01-30 PROCEDURE — 3074F SYST BP LT 130 MM HG: CPT | Performed by: INTERNAL MEDICINE

## 2024-01-30 PROCEDURE — 99211 OFF/OP EST MAY X REQ PHY/QHP: CPT | Performed by: INTERNAL MEDICINE

## 2024-01-30 PROCEDURE — 99214 OFFICE O/P EST MOD 30 MIN: CPT | Performed by: INTERNAL MEDICINE

## 2024-01-30 PROCEDURE — 3078F DIAST BP <80 MM HG: CPT | Performed by: INTERNAL MEDICINE

## 2024-01-30 RX ORDER — OMEPRAZOLE 20 MG/1
20 CAPSULE, DELAYED RELEASE ORAL DAILY
COMMUNITY

## 2024-01-30 NOTE — PROGRESS NOTES
Patient ID: Frank Lisa, 1960, [id number], 63 y.o.  Referred by : Dr Chung  Reason for consultation:   Esophageal cancer T2N0Mx  Stage II   started on concurrent chemoradiation preoperatively on 5/4/21  Chemoradiation completed 6/9/21  Patient had a PET scan on 7/23/2021 which showed no evidence of recurrence  Patient has been evaluated by thoracic surgeon at [facility] Dr. Velásquez and also hepatologist Dr. Sheffield  At [place] his case was discussed at thoracic tumor oncology board with recommendations NOT to do any surgery because of his liver failure.  So surveillance recommended  He had an upper endoscopy on 8/31/21 which showed no residual cancer but showed Lezama's esophagus with high-grade dysplasia.   Another endoscopy on 1/21/2022 was negative for recurrence  His CEA level is rising therefore PET scan was done on 10/26/2023 showed mild metabolic activity in the distal esophagus and no obvious recurrence noted  Patient had a EGD on 1/9/2024 and the biopsy showed no evidence of recurrence  HISTORY OF PRESENT ILLNESS:    Oncologic History:  Frank Lisa is a 63 y.o. male with a history of hepatitis C, status post treatment, liver cirrhosis, history of alcohol abuse was seen during initial consultation visit for newly diagnosed esophageal cancer.    Patient reportedly had \"heavy drinking alcohol in about 2016 however recently in December he had 2 beers and he presented with hematemesis.  On 12/29/2020 he had upper endoscopy which showed severe portal hypertension, gastropathy.  The biopsy from the GE junction showed invasive adenocarcinoma.  Patient had a follow-up EGD on 2/2/2021 which confirmed esophageal adenocarcinoma.  Patient had endoscopic ultrasound on 2/12/2021 which showed T2 N0 MX disease with underlying esophageal varices.  In the esophagus hypoechoic lesion noted in the lower esophagus penetrating into submucosa and into muscularis propria.  No lymphadenopathy

## 2024-01-30 NOTE — PATIENT INSTRUCTIONS
PORT removal with IR  RTC 3 months with lab   VSS. Patient denies pain. No N&V. SCD's in place throughout duration in PACU. Transferred to the next Phase of Care.

## 2024-01-30 NOTE — TELEPHONE ENCOUNTER
PORT removal with IR  RTC 3 months with lab      Port removal scheduled for 2/5/24 @ 1:00pm @ [facility]    Rv  scheduled for 4/30/24 @ 11:15am    Pt will have labs(cbc,cmp,cea) drawn one week prior to RV    PT was given AVS and appt schedule    Electronically signed by Ellen Atkinson on 1/30/2024 at 11:38 AM

## 2024-01-31 ENCOUNTER — HOSPITAL ENCOUNTER (OUTPATIENT)
Dept: GENERAL RADIOLOGY | Age: 64
Discharge: HOME | End: 2024-02-02
Payer: COMMERCIAL

## 2024-01-31 DIAGNOSIS — R13.10 DYSPHAGIA, UNSPECIFIED TYPE: ICD-10-CM

## 2024-01-31 PROCEDURE — 74230 X-RAY XM SWLNG FUNCJ C+: CPT

## 2024-01-31 PROCEDURE — 92611 MOTION FLUOROSCOPY/SWALLOW: CPT

## 2024-01-31 NOTE — PROCEDURES
INSTRUMENTAL SWALLOW REPORT  MODIFIED BARIUM SWALLOW    NAME: Frank Lisa   : 1960  MRN: [id number]       Date of Eval: 2024              Referring Diagnosis(es):  dysphagia    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), Alcoholic cirrhosis of liver with ascites (HCC), Anemia, Anxiety, Arthritis, Back pain, chronic, Lezama esophagus, Bleeding gastric varices, BPH (benign prostatic hypertrophy), Cholelithiasis, Cirrhosis (HCC), COVID-19, COVID-19 vaccine series completed, DDD (degenerative disc disease), lumbar, Depression, Esophageal cancer (HCC), Esophageal varices (HCC), Fatty liver, GERD (gastroesophageal reflux disease), GI bleed, Heart murmur, Hep C w/o coma, chronic (HCC), Hiatal hernia, History of alcohol abuse, History of blood transfusion, History of colon polyps, History of tobacco abuse, [place] (hard of hearing), Hyperlipidemia, Hypertension, Hyponatremia, Hypotension, Mastoid disorder, bilateral, Pericardial effusion, PONV (postoperative nausea and vomiting), Poor venous access, Port-A-Cath in place, Portal hypertension (HCC), Sciatica, Secondary esophageal varices (HCC), Shortness of breath, Spinal stenosis, Stomach ulcer, Thrombocytopenia (HCC), Tubular adenoma of colon, Under care of team, Under care of team, Vitamin D deficiency, and Wears glasses.  Past Surgical History:  has a past surgical history that includes Bunionectomy; Nasal septum surgery; other surgical history (2016); Colonoscopy; Colonoscopy (10/05/2016); other surgical history (2016); other surgical history (2016); knee surgery (Left); Bunionectomy (Left); Endoscopy, colon, diagnostic; pr neuroplasty &/transpos median nrv carpal tunne (Right, 2017); Carpal tunnel release (Right); pr neuroplasty &/transpos median nrv carpal tunne (Left, 10/31/2017); Colonoscopy (N/A,

## 2024-02-01 ENCOUNTER — TELEPHONE (OUTPATIENT)
Dept: PRIMARY CARE CLINIC | Age: 64
End: 2024-02-01

## 2024-02-01 NOTE — TELEPHONE ENCOUNTER
Care Transitions Initial Follow Up Call    Outreach made within 2 business days of discharge: Yes    Patient: Frank Lisa Patient : 1960   MRN: [id number]  Reason for Admission: There are no discharge diagnoses documented for the most recent discharge.  Discharge Date: 24       Spoke with: pt     Discharge department/facility: [facility] Interactive Patient Contact:  Was patient able to fill all prescriptions: Yes  Was patient instructed to bring all medications to the follow-up visit: Yes  Is patient taking all medications as directed in the discharge summary? Yes  Does patient understand their discharge instructions: Yes  Does patient have questions or concerns that need addressed prior to 7-14 day follow up office visit: no    Scheduled appointment with PCP within 7-14 days    Follow Up  Future Appointments   Date Time Provider Department Center   2024  2:00 PM [facility] IR  [facility] Radiolog   2024  1:00 PM Lopez Rosario PA [facility]   3/1/2024  1:00 PM Augusto Cordova MD [facility]   3/21/2024 11:30 AM Ashleigh Sotelo DO [facility]   2024 11:15 AM Garrison Durán MD [facility]       PHILIPPE RIOS MA

## 2024-02-05 ENCOUNTER — HOSPITAL ENCOUNTER (OUTPATIENT)
Dept: INTERVENTIONAL RADIOLOGY/VASCULAR | Age: 64
Discharge: HOME | End: 2024-02-07
Payer: COMMERCIAL

## 2024-02-05 DIAGNOSIS — C15.5 MALIGNANT NEOPLASM OF LOWER THIRD OF ESOPHAGUS (HCC): ICD-10-CM

## 2024-02-05 PROCEDURE — 77001 FLUOROGUIDE FOR VEIN DEVICE: CPT

## 2024-02-05 PROCEDURE — 2709999900 IR REMOVAL CVC WO PORT/PUMP

## 2024-02-05 PROCEDURE — 36590 REMOVAL TUNNELED CV CATH: CPT

## 2024-02-05 RX ORDER — ACETAMINOPHEN 325 MG/1
650 TABLET ORAL EVERY 4 HOURS PRN
Status: DISCONTINUED | OUTPATIENT
Start: 2024-02-05 | End: 2024-02-08 | Stop reason: HOSPADM

## 2024-02-05 NOTE — BRIEF OP NOTE
Brief Postoperative Note    Frank Lisa  YOB: 1960  [id number]    Pre-operative Diagnosis: Chemoport removal    Post-operative Diagnosis: Same    Procedure: Port no longer functional or needed    Medications Given: none    Anesthesia: Local    Surgeons/Assistants: CONCHA SMITH MD    Estimated Blood Loss: minimal    Complications: none    Specimens: were not obtained    Findings: Rt IJ chemoport removed uneventfully.       Electronically signed by CONCHA SMITH MD on 2/5/2024 at 3:42 PM

## 2024-02-05 NOTE — H&P
HISTORY and [id number]       NAME:  Carrie Arrington  MRN: [id number]   YOB: 1960   Date: 6/28/2022   Age: 57 y.o. Gender: male       COMPLAINT AND PRESENT HISTORY:   Carrie Arrington  is a 57 y.o. male presenting today for an IR US guided paracentesis. Pt has hx of alcoholic liver disease with cirrhosis, hx of Hep C. He also has hx of GE junction adenocarcinoma s/p chemoradiation completed 6/9/21. He states he \"feels 9 months pregnant\" he denies any pain or SOB. States he does feel [place] than normal.   Pt had last tx on 6/8 with 11.8L removed. Pt states \"its been too long\" he remarks he would like to get on a regular schedule. Pt has a PMHX significant for HTN, [place], right chest port        NPO since midnight. Sip of water with meds. Pt does not wear dentures. Pt denies any hx of MRSA infection  Pt not currently taking any blood thinners or anticoagulants  Pt denies any personal or FHx of complications with anesthesia. Pt denies any acute symptoms of illness at this time including no SOB, CP, fever, URI or UTI symptoms. RECENT IMAGING    XR LUMBAR SPINE (2-3 VIEWS)    Result Date: 6/12/2022  1. No listhesis or evidence for dynamic instability. 2.  Advanced multilevel degenerative disc disease and lower lumbar facet arthropathy. 3.  Chronic compression deformity of T12. No new osseous abnormality appreciated. IR US GUIDED PARACENTESIS    Result Date: 6/8/2022  Successful paracentesis. Intravenous albumin administration is recommended.         PAST MEDICAL HISTORY     Past Medical History:   Diagnosis Date    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 Rafael Victor, orthopedic, every 3-4 months, gets steroid injection    Lezama esophagus     BPH (benign prostatic hypertrophy)     Cholelithiasis     Cirrhosis ([address]     COVID-19 12/2020    pt reports he had a positive test while at [facility] in 2020, was SURGICAL HISTORY  01/04/16    steroid injection C7 T1    OTHER SURGICAL HISTORY  11/21/2016    Bilateral Lumbar CACHORRO L4-L5 injections    OTHER SURGICAL HISTORY  12/19/2016    lumbar steroid injection    OTHER SURGICAL HISTORY  09/28/2018    BILATERAL L5 CACHORRO (N/A Back)    OTHER SURGICAL HISTORY Right 11/23/2020    cervical injection    PAIN MANAGEMENT PROCEDURE Left 7/9/2020    EPIDURAL STEROID INJECTION LEFT L4 L5 performed by Fracisco Vickers MD at [facility] Left 7/20/2020    LEFT L4 L5 EPIDURAL STEROID INJECTION performed by Fracisco Vickers MD at [facility] Bilateral 8/17/2020    LUMBAR FACET BILATERAL L2-L5 performed by Fracisco Vickers MD at [facility] Bilateral 12/7/2020    NERVE BLOCK BILATERAL LUMBAR MEDIAL BRANCH L2-L5 performed by Fracisco Vickers MD at [facility] AA&/STRD TFRML EPI LUMBAR/SACRAL 1 LEVEL Bilateral 9/6/2018    BILATERAL L5 CACHORRO performed by Fracisco Vickers MD at [facility] AA&/STRD TFRML EPI LUMBAR/SACRAL 1 LEVEL N/A 9/28/2018    BILATERAL L5 CACHORRO performed by Fracisco Vickers MD at [address] [place] [address] N/CARPAL TUNNEL SURG Right 8/29/2017    CARPAL TUNNEL RELEASE RIGHT performed by Lianne Ling MD at [address] [place] [address] N/CARPAL TUNNEL SURG Left 10/31/2017    CARPAL TUNNEL RELEASE performed by Lianne Ling MD at [place] [address] 12/29/2020    EGD BIOPSY performed by Sophie Soliman MD at [address] [place] [address] 2/2/2021    EGD BIOPSY and spot marking performed by Sonja Nava MD at [address] [place] [address] N/A 2/12/2021    ENDOSCOPIC ULTRASOUND, EGD performed by Alba Najera MD at [place] [address]  2/12/2021    EGD DIAGNOSTIC ONLY performed by Alba Najera MD at [place] [address] N/A 8/31/2021    EGD BIOPSY performed by Nicki Mckay MD at [facility] N/A 2022    EGD BIOPSY performed by Nicki Mckay MD at [place] [address] N/A 4/15/2022    EGD ESOPHAGOGASTRODUODENOSCOPY performed by Renay Prakash MD at [address] [facility] N/A 2022    EGD BAND LIGATION performed by Shyla Coombs MD at [place] [address] N/A 2022    EGD ESOPHAGOGASTRODUODENOSCOPY performed by Shyla Coombs MD at [address] [place] [address]       Family History   Problem Relation Age of Onset    Cancer Mother         pancreatic    Cancer Father         bone    Diabetes Sister     Asthma Brother        SOCIAL HISTORY       Social History     Socioeconomic History    Marital status: Single     Spouse name: Not on file    Number of children: Not on file    Years of education: Not on file    Highest education level: Not on file   Occupational History    Not on file   Tobacco Use    Smoking status: Former Smoker     Packs/day: 1.00     Years: 45.00     Pack years: 45.00     Quit date: 2017     Years since quittin.4    Smokeless tobacco: Never Used   Vaping Use    Vaping Use: Never used   Substance and Sexual Activity    Alcohol use: Not Currently     Comment: quit     Drug use: Not Currently     Frequency: 1.0 times per week     Comment: cocaine,  stopped spring 2016    Sexual activity: Yes     Partners: Female   Other Topics Concern    Not on file   Social History Narrative     in the past, retired     Social Determinants of [address] Strain: [address] Difficulty of Paying Living Expenses: Not hard at all   Food Insecurity: No Food Insecurity    Worried About [address] in the Last Year: Never true   [address] in the Last Year: Never true   Transportation Needs:     Lack of Transportation (Medical):  Not on file  Lack of Transportation (Non-Medical):  Not on file   Physical Activity:     Days of Exercise per Week: Not on file    Minutes of Exercise per Session: Not on file   Stress:     Feeling of Stress : Not on file   Social Connections:     Frequency of Communication with Friends and Family: Not on file    Frequency of Social Gatherings with Friends and Family: Not on file    Attends [employer] Services: Not on file    Active Member of [address] [place] [address] or Organizations: Not on file    Attends Club or Organization Meetings: Not on file    Marital Status: Not on file   Intimate Partner Violence:     Fear of Current or Ex-Partner: Not on file    Emotionally Abused: Not on file    Physically Abused: Not on file    Sexually Abused: Not on file   Housing Stability:     Unable to Pay for Housing in the Last Year: Not on file    Number of [place] in the Last Year: Not on file    Unstable Housing in the Last Year: Not on file           REVIEW OF SYSTEMS      No Known Allergies    Current Outpatient Medications on File Prior to Encounter   Medication Sig Dispense Refill    furosemide (LASIX) 20 MG tablet Take 1 tablet by mouth 2 times daily 60 tablet 3    spironolactone (ALDACTONE) 100 MG tablet Take 1 tablet by mouth every evening 30 tablet 1    pantoprazole (PROTONIX) 40 MG tablet Take 1 tablet by mouth 2 times daily for 14 days 28 tablet 0    sucralfate (CARAFATE) 1 GM tablet Take 1 tablet by mouth 4 times daily for 14 days 56 tablet 0    vitamin D (CHOLECALCIFEROL) 25 MCG (1000 UT) TABS tablet Take 1,000 Units by mouth daily      ondansetron (ZOFRAN-ODT) 4 MG disintegrating tablet dissolve 1 tablet by mouth every 8 hours if needed for nausea and vomiting 10 tablet 0    lactulose (CHRONULAC) 10 GM/15ML solution take 30 milliliters by mouth three times a day 473 mL 1    nadolol (CORGARD) 20 MG tablet take 1 tablet by mouth once daily      FLUoxetine (PROZAC) 20 MG capsule Take 1 capsule by mouth daily 30 capsule 3    atorvastatin (LIPITOR) 20 MG tablet Take 1 tablet by mouth nightly 30 tablet 3    midodrine (PROAMATINE) 5 MG tablet Take 1 tablet by mouth 3 times daily as needed (SBP <110) 90 tablet 3    ferrous sulfate (IRON 325) 325 (65 Fe) MG tablet Take 1 tablet by mouth 2 times daily 60 tablet 5     No current facility-administered medications on file prior to encounter. Review of Systems   Constitutional: Negative for chills, diaphoresis, fatigue and fever. HENT: Negative for congestion, dental problem, ear pain, postnasal drip, rhinorrhea, sinus pressure, sinus pain, sore throat and trouble swallowing. Eyes: Positive for visual disturbance. Glasses    Respiratory: Negative for apnea, cough, chest tightness, shortness of breath and wheezing. Cardiovascular: Negative for chest pain, palpitations and leg swelling. Gastrointestinal: Positive for abdominal distention. See HPI    Genitourinary: Negative for dysuria, flank pain, frequency and hematuria. Musculoskeletal: Negative for back pain, joint swelling and myalgias. Skin: Positive for color change. Negative for rash and wound. Scattered bruising. Neurological: Negative for dizziness, weakness, numbness and headaches. Hematological: Bruises/bleeds easily. Psychiatric/Behavioral: Negative for agitation and confusion. The patient is not nervous/anxious. See HPI    GENERAL PHYSICAL EXAM:     Vitals: See nurse flowsheet for current vitals. Physical Exam  Constitutional:       General: He is not in acute distress. Appearance: Normal appearance. He is well-developed and normal weight. He is not ill-appearing or toxic-appearing. HENT:      Head: Normocephalic and atraumatic. Ears:      Comments: [place]     Mouth/Throat:      Mouth: Mucous membranes are moist.      Pharynx: Oropharynx is clear. No oropharyngeal exudate or posterior oropharyngeal erythema.    Eyes:      Extraocular Movements: Extraocular movements intact. Conjunctiva/sclera: Conjunctivae normal.      Pupils: Pupils are equal, round, and reactive to light. Comments: +glasses    Cardiovascular:      Rate and Rhythm: Normal rate and regular rhythm. Pulses: Normal pulses. Heart sounds: Normal heart sounds. No murmur heard. No friction rub. No gallop. Pulmonary:      Effort: Pulmonary effort is normal.      Breath sounds: Wheezing present. Comments: Faint wheezes in posterior upper lung fields. Abdominal:      General: Bowel sounds are normal. There is distension. Palpations: Abdomen is soft. Tenderness: There is no abdominal tenderness. There is no guarding or rebound. Comments: Hyperactive high pitched sounds. Musculoskeletal:         General: No swelling. Normal range of motion. Cervical back: Normal range of motion and neck supple. No rigidity or tenderness. Right lower leg: No edema. Left lower leg: No edema. Skin:     General: Skin is warm and dry. Findings: Bruising and lesion present. No erythema. Comments: Scattered bruising noted, several small scabbed areas noted. Neurological:      General: No focal deficit present. Mental Status: He is alert and oriented to person, place, and time. Mental status is at baseline. Sensory: No sensory deficit. Gait: Gait abnormal.   Psychiatric:         Mood and Affect: Mood normal.         Behavior: Behavior normal.         Thought Content:  Thought content normal.         Judgment: Judgment normal.                                                                                         PROVISIONAL DIAGNOSES / SURGERY:      IR US guided paracentesis     Patient Active Problem List    Diagnosis Date Noted    Secondary esophageal varices ([facility] 06/07/2022    Esophageal polyp 06/07/2022    Drop in hemoglobin 06/03/2022    Portal hypertension (HCC)     Shortness of breath     Ascites 04/26/2022 coma, chronic [facility]     Fatty liver     Calculus of gallbladder without cholecystitis 08/10/2016    Chronic viral hepatitis B without delta agent and without coma ([facility] 07/22/2016    Hypomagnesemia     Alcohol withdrawal syndrome without complication ([facility] 18/70/4082    Cervical radicular pain 01/04/2016    Tubular adenoma of colon 01/01/2016    History of colon polyps 01/01/2016    Back pain, chronic 04/19/2012    Hearing difficulty 04/19/2012    GERD (gastroesophageal reflux disease) 04/19/2012               MASSIMO Cole - CNP on 6/28/2022 at 12:12 PM Assistance OOB with selected safe patient handling equipment if applicable/Assistance with ambulation/Communicate risk of Fall with Harm to all staff, patient, and family/Monitor gait and stability/Provide patient with walking aids/Provide visual cue: red socks, yellow wristband, yellow gown, etc/Reinforce activity limits and safety measures with patient and family/Bed in lowest position, wheels locked, appropriate side rails in place/Call bell, personal items and telephone in reach/Instruct patient to call for assistance before getting out of bed/chair/stretcher/Non-slip footwear applied when patient is off stretcher/[place] to call system/Physically safe environment - no spills, clutter or unnecessary equipment/Purposeful Proactive Rounding/Room/bathroom lighting operational, light cord in reach

## 2024-02-05 NOTE — PROGRESS NOTES
Patient tolerated port removal without distress. Surgical glue to site. Discharge instructions given, no questions at this time. Patient discharged home via private auto.

## 2024-02-14 ENCOUNTER — OFFICE VISIT (OUTPATIENT)
Dept: PRIMARY CARE CLINIC | Age: 64
End: 2024-02-14

## 2024-02-14 VITALS
OXYGEN SATURATION: 96 % | BODY MASS INDEX: 26.26 KG/M2 | HEIGHT: 70 IN | WEIGHT: 183.4 LBS | DIASTOLIC BLOOD PRESSURE: 60 MMHG | SYSTOLIC BLOOD PRESSURE: 110 MMHG | HEART RATE: 86 BPM

## 2024-02-14 DIAGNOSIS — K70.31 ALCOHOLIC CIRRHOSIS OF LIVER WITH ASCITES (HCC): ICD-10-CM

## 2024-02-14 DIAGNOSIS — K74.69 DECOMPENSATED LIVER DISEASE (HCC): Primary | ICD-10-CM

## 2024-02-14 DIAGNOSIS — K70.30 ALCOHOLIC CIRRHOSIS, UNSPECIFIED WHETHER ASCITES PRESENT (HCC): ICD-10-CM

## 2024-02-14 DIAGNOSIS — Z09 HOSPITAL DISCHARGE FOLLOW-UP: ICD-10-CM

## 2024-02-14 RX ORDER — POTASSIUM CHLORIDE 20 MEQ/1
TABLET, EXTENDED RELEASE ORAL
COMMUNITY
Start: 2024-01-31

## 2024-02-14 NOTE — PROGRESS NOTES
Post-Discharge Transitional Care  Follow Up      Frank Lisa   YOB: 1960    Date of Office Visit:  2/14/2024  Date of Hospital Admission: 1/2/24  Date of Hospital Discharge: 1/11/24  Risk of hospital readmission (high >=14%. Medium >=10%) :Readmission Risk Score: 32.2      Care management risk score Rising risk (score 2-5) and Complex Care (Scores >=6): No Risk Score On File     Non face to face  following discharge, date last encounter closed (first attempt may have been earlier): 02/01/2024    Call initiated 2 business days of discharge: Yes    ASSESSMENT/PLAN:   Decompensated liver disease (HCC)  -     SLP videofluoroscopic swallow study; Future  -     Ambulatory Referral to Care Management  -     FL MODIFIED BARIUM SWALLOW W VIDEO; Future  Alcoholic cirrhosis, unspecified whether ascites present (HCC)  -     SLP videofluoroscopic swallow study; Future  -     Ambulatory Referral to Care Management  -     FL MODIFIED BARIUM SWALLOW W VIDEO; Future  Alcoholic cirrhosis of liver with ascites (HCC)  -     Ambulatory Referral to Care Management  -     FL MODIFIED BARIUM SWALLOW W VIDEO; Future  Hospital discharge follow-up  -     CT DISCHARGE MEDS RECONCILED W/ CURRENT OUTPATIENT MED LIST  -     FL MODIFIED BARIUM SWALLOW W VIDEO; Future      Medical Decision Making: high complexity  Return for Follow up if symptoms persist or worsen.           Subjective:   HPI:  Follow up of Hospital problems/diagnosis(es): Was in [place] withdrawal symptoms and nausea and vomiting and delerium. Was in rehab facility [facility]. Had swallow test that was failed so he had tube placed. No longer using tube. They did see a speech therapist who watched him eat. Denies any issues with this.     Inpatient course: Discharge summary reviewed- see chart.    Interval history/Current status: Not using peg tube. Would like to have this removed. Will get swallow study prior to this. Only speech therapy came to home. Not

## 2024-02-20 ENCOUNTER — CARE COORDINATION (OUTPATIENT)
Dept: CARE COORDINATION | Age: 64
End: 2024-02-20

## 2024-02-20 NOTE — CARE COORDINATION
PCP referral from queue for care management.  Left VM message asking patient to return call for care management assessment/enrollment.  Will call again next week.    Future Appointments   Date Time Provider Department Center   2/21/2024  2:00 PM [facility] ROOM 1 [facility] Radiolog   3/1/2024  1:00 PM Augusto Cordova MD [facility]   3/21/2024 11:30 AM Ashleigh Sotelo DO  NEUR[facility]   4/30/2024 11:15 AM Garrison Durán MD [facility]

## 2024-02-21 ENCOUNTER — HOSPITAL ENCOUNTER (OUTPATIENT)
Dept: GENERAL RADIOLOGY | Age: 64
Discharge: HOME | End: 2024-02-23
Payer: COMMERCIAL

## 2024-02-21 DIAGNOSIS — K74.69 DECOMPENSATED LIVER DISEASE (HCC): ICD-10-CM

## 2024-02-21 DIAGNOSIS — K70.31 ALCOHOLIC CIRRHOSIS OF LIVER WITH ASCITES (HCC): ICD-10-CM

## 2024-02-21 DIAGNOSIS — Z09 HOSPITAL DISCHARGE FOLLOW-UP: ICD-10-CM

## 2024-02-21 DIAGNOSIS — K70.30 ALCOHOLIC CIRRHOSIS, UNSPECIFIED WHETHER ASCITES PRESENT (HCC): ICD-10-CM

## 2024-02-21 PROCEDURE — 74230 X-RAY XM SWLNG FUNCJ C+: CPT

## 2024-02-21 PROCEDURE — 92611 MOTION FLUOROSCOPY/SWALLOW: CPT

## 2024-02-21 NOTE — PROCEDURES
INSTRUMENTAL SWALLOW REPORT  MODIFIED BARIUM SWALLOW    NAME: Frank Lisa   : 1960  MRN: [id number]       Date of Eval: 2024              Referring Diagnosis(es):  dysphagia    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), Alcoholic cirrhosis of liver with ascites (HCC), Anemia, Anxiety, Arthritis, Back pain, chronic, Lezama esophagus, Bleeding gastric varices, BPH (benign prostatic hypertrophy), Cholelithiasis, Cirrhosis (HCC), COVID-19, COVID-19 vaccine series completed, DDD (degenerative disc disease), lumbar, Depression, Esophageal cancer (HCC), Esophageal varices (HCC), Fatty liver, GERD (gastroesophageal reflux disease), GI bleed, Heart murmur, Hep C w/o coma, chronic (HCC), Hiatal hernia, History of alcohol abuse, History of blood transfusion, History of colon polyps, History of tobacco abuse, [place] (hard of hearing), Hyperlipidemia, Hypertension, Hyponatremia, Hypotension, Mastoid disorder, bilateral, Pericardial effusion, PONV (postoperative nausea and vomiting), Poor venous access, Port-A-Cath in place, Portal hypertension (HCC), Sciatica, Secondary esophageal varices (HCC), Shortness of breath, Spinal stenosis, Stomach ulcer, Thrombocytopenia (HCC), Tubular adenoma of colon, Under care of team, Under care of team, Vitamin D deficiency, and Wears glasses.  Past Surgical History:  has a past surgical history that includes Bunionectomy; Nasal septum surgery; other surgical history (2016); Colonoscopy; Colonoscopy (10/05/2016); other surgical history (2016); other surgical history (2016); knee surgery (Left); Bunionectomy (Left); Endoscopy, colon, diagnostic; pr neuroplasty &/transpos median nrv carpal tunne (Right, 2017); Carpal tunnel release (Right); pr neuroplasty &/transpos median nrv carpal tunne (Left, 10/31/2017); Colonoscopy (N/A,

## 2024-02-21 NOTE — RESULT ENCOUNTER NOTE
Call patient and advise that test results were okay no aspiration. Okay to ask surgeon and gastro for removal of feeding tube.

## 2024-02-24 NOTE — ED NOTES
Bed: 07A  Expected date:   Expected time:   Means of arrival:   Comments:  [id number] [facility]  02/02/22 4851 Her/She Fear of needles and procedures

## 2024-02-28 ENCOUNTER — CARE COORDINATION (OUTPATIENT)
Dept: CARE COORDINATION | Age: 64
End: 2024-02-28

## 2024-02-28 NOTE — CARE COORDINATION
Left VM message asking patient to return call for care management assessment/enrollment. Will call again in a few weeks.    Future Appointments   Date Time Provider Department Center   3/1/2024  1:00 PM Augusto Cordova MD [facility]   3/21/2024 11:30 AM Ashleigh Sotelo DO  [facility]   4/30/2024 11:15 AM Grarison Durán MD [facility]

## 2024-03-01 ENCOUNTER — OFFICE VISIT (OUTPATIENT)
Dept: GASTROENTEROLOGY | Age: 64
End: 2024-03-01

## 2024-03-01 VITALS
BODY MASS INDEX: 25.54 KG/M2 | DIASTOLIC BLOOD PRESSURE: 78 MMHG | HEIGHT: 70 IN | WEIGHT: 178.4 LBS | HEART RATE: 102 BPM | SYSTOLIC BLOOD PRESSURE: 128 MMHG

## 2024-03-01 DIAGNOSIS — K70.31 ALCOHOLIC CIRRHOSIS OF LIVER WITH ASCITES (HCC): ICD-10-CM

## 2024-03-01 DIAGNOSIS — K74.69 DECOMPENSATED LIVER DISEASE (HCC): ICD-10-CM

## 2024-03-01 DIAGNOSIS — C15.5 MALIGNANT NEOPLASM OF LOWER THIRD OF ESOPHAGUS (HCC): Primary | ICD-10-CM

## 2024-03-01 DIAGNOSIS — K22.711 BARRETT'S ESOPHAGUS WITH HIGH GRADE DYSPLASIA: ICD-10-CM

## 2024-03-01 DIAGNOSIS — K76.6 PORTAL HYPERTENSION (HCC): ICD-10-CM

## 2024-03-01 DIAGNOSIS — R97.0 HIGH CARCINOEMBRYONIC ANTIGEN (CEA): ICD-10-CM

## 2024-03-01 NOTE — PROGRESS NOTES
antigen (CEA)        5. Decompensated liver disease (HCC)        6. Lezama's esophagus with high grade dysplasia          Clinically appears stable.  He is wanting PEG tube removed as he has not used this in the last several weeks.  He is tolerating diet well.    Patient has high CEA.  Recommended colonoscopy.    To continue Lasix, Chronulac, nadolol, PPI.    RTO next 2 weeks to discuss PEG removal and colonoscopy        Thank you for allowing me to participate in the care of Mr. Lisa. For any further questions please do not hesitate to contact me.    I have reviewed and agree with the ROS entered by the MA/TODD.         ANNA Recio    [facility]  Office #: [phone]

## 2024-03-08 DIAGNOSIS — K70.30 ALCOHOLIC CIRRHOSIS, UNSPECIFIED WHETHER ASCITES PRESENT (HCC): ICD-10-CM

## 2024-03-08 DIAGNOSIS — G62.9 NEUROPATHY: ICD-10-CM

## 2024-03-08 DIAGNOSIS — D64.9 ANEMIA, UNSPECIFIED TYPE: ICD-10-CM

## 2024-03-08 NOTE — TELEPHONE ENCOUNTER
Received call from [facility] requesting all his meds be sent to them. VM left for pt to call back, we need to verify he does want to use this pharmacy

## 2024-03-11 ENCOUNTER — CARE COORDINATION (OUTPATIENT)
Dept: CARE COORDINATION | Age: 64
End: 2024-03-11

## 2024-03-11 NOTE — CARE COORDINATION
Left VM message asking patient to return call for care management assessment/enrollment. This is third attempt to contact, he has not returned calls, unable to enroll in care management.    Future Appointments   Date Time Provider Department Center   3/15/2024  1:15 PM Augusto Cordova MD [facility]   3/21/2024 11:30 AM Ashleigh Sotelo DO  [facility]   4/30/2024 11:15 AM Garrison Durán MD [facility]        Purse String (Intermediate) Text: Given the location of the defect and the characteristics of the surrounding skin a pursestring intermediate closure was deemed most appropriate.  Undermining was performed circumfirentially around the surgical defect.  A purstring suture was then placed and tightened.

## 2024-03-15 ENCOUNTER — OFFICE VISIT (OUTPATIENT)
Dept: GASTROENTEROLOGY | Age: 64
End: 2024-03-15

## 2024-03-15 VITALS
HEIGHT: 70 IN | HEART RATE: 104 BPM | WEIGHT: 179.4 LBS | BODY MASS INDEX: 25.68 KG/M2 | DIASTOLIC BLOOD PRESSURE: 73 MMHG | SYSTOLIC BLOOD PRESSURE: 113 MMHG

## 2024-03-15 DIAGNOSIS — Z93.1 S/P PERCUTANEOUS ENDOSCOPIC GASTROSTOMY (PEG) TUBE PLACEMENT (HCC): ICD-10-CM

## 2024-03-15 DIAGNOSIS — K76.6 PORTAL HYPERTENSION (HCC): ICD-10-CM

## 2024-03-15 DIAGNOSIS — K70.31 ALCOHOLIC CIRRHOSIS OF LIVER WITH ASCITES (HCC): ICD-10-CM

## 2024-03-15 DIAGNOSIS — C15.5 MALIGNANT NEOPLASM OF LOWER THIRD OF ESOPHAGUS (HCC): Primary | ICD-10-CM

## 2024-03-15 ASSESSMENT — ENCOUNTER SYMPTOMS
ANAL BLEEDING: 0
ABDOMINAL DISTENTION: 0
CHOKING: 0
BACK PAIN: 0
SINUS PRESSURE: 0
CONSTIPATION: 0
RECTAL PAIN: 0
DIARRHEA: 0
COUGH: 0
WHEEZING: 0
TROUBLE SWALLOWING: 0
VOMITING: 0
BLOOD IN STOOL: 0
SORE THROAT: 0
ABDOMINAL PAIN: 0
NAUSEA: 0
VOICE CHANGE: 0

## 2024-03-15 NOTE — PROGRESS NOTES
separate speech pathology report for full discussion of findings and recommendations.          Assessment  1. Malignant neoplasm of lower third of esophagus (HCC)    2. Alcoholic cirrhosis of liver with ascites (HCC)    3. Portal hypertension (HCC)    4. S/P percutaneous endoscopic gastrostomy (PEG) tube placement (HCC)        Plan    PEG tube shortened in office.  Plan for removal next few weeks per patient request.  He is tolerating PO intake.  Recent video swallow reviewed.    The Endoscopic procedure was explained to the patient in detail  The prep and NPO were explained  All the Risks, Benefits, and Alternatives were explained  Risk of Bleeding, Perforation and Cardio Respiratory risks were explained  his questions were answered  The procedure has been scheduled with the  in the office  Patient was asked to give us a call for any questions  The patient has verbalized understanding and agreement to this plan.      Thank you for allowing me to participate in the care of Mr. Lisa. For any further questions please do not hesitate to contact me.    I have reviewed and agree with the ROS entered by the MA/LPN.     Note is dictated utilizing voice recognition software. Unfortunately this leads to occasional typographical errors. Please contact our office if you have any questions        Augusto Cordova MD,FACP, FACG  Board Certified in Gastroenterology and Internal Medicine  [facility]  Office #: [phone]

## 2024-03-15 NOTE — TELEPHONE ENCOUNTER
There are a lot of meds on his list.  Please verify which ones he needs.  Looks like they are all in there form when he was in rehab after surgery

## 2024-03-15 NOTE — TELEPHONE ENCOUNTER
Pt confirmed that he wanted the medication sent  to [facility] care just need Lopez to sign off on the transfer of medications

## 2024-03-18 RX ORDER — THIAMINE MONONITRATE (VIT B1) 100 MG
100 TABLET ORAL DAILY
Qty: 90 TABLET | Refills: 0 | Status: SHIPPED | OUTPATIENT
Start: 2024-03-18

## 2024-03-18 RX ORDER — PNV NO.95/FERROUS FUM/FOLIC AC 28MG-0.8MG
1 TABLET ORAL DAILY
Qty: 30 TABLET | Refills: 0 | Status: SHIPPED | OUTPATIENT
Start: 2024-03-18

## 2024-03-18 RX ORDER — POTASSIUM CHLORIDE 20 MEQ/15ML
20 SOLUTION ORAL DAILY
Qty: 900 ML | Refills: 0 | Status: SHIPPED | OUTPATIENT
Start: 2024-03-18

## 2024-03-18 RX ORDER — LACTULOSE 10 G/15ML
30 SOLUTION ORAL 3 TIMES DAILY
Qty: 2700 ML | Refills: 0 | Status: SHIPPED | OUTPATIENT
Start: 2024-03-18

## 2024-03-18 RX ORDER — GABAPENTIN 100 MG/1
100 CAPSULE ORAL NIGHTLY
Qty: 30 CAPSULE | Refills: 0 | Status: SHIPPED | OUTPATIENT
Start: 2024-03-18 | End: 2024-04-17

## 2024-03-18 RX ORDER — ONDANSETRON 4 MG/1
TABLET, ORALLY DISINTEGRATING ORAL
Qty: 90 TABLET | Refills: 0 | Status: SHIPPED | OUTPATIENT
Start: 2024-03-18

## 2024-03-18 RX ORDER — FUROSEMIDE 20 MG/1
20 TABLET ORAL DAILY
Qty: 30 TABLET | Refills: 0 | Status: SHIPPED | OUTPATIENT
Start: 2024-03-18

## 2024-03-18 RX ORDER — NADOLOL 20 MG/1
10 TABLET ORAL DAILY
Qty: 30 TABLET | Refills: 0 | Status: SHIPPED | OUTPATIENT
Start: 2024-03-18

## 2024-03-18 RX ORDER — SUCRALFATE 1 G/1
1 TABLET ORAL EVERY 8 HOURS SCHEDULED
Qty: 42 TABLET | Refills: 0 | Status: SHIPPED | OUTPATIENT
Start: 2024-03-18 | End: 2024-04-01

## 2024-03-18 RX ORDER — MULTIVIT-MIN/FERROUS GLUCONATE 9 MG/15 ML
15 LIQUID (ML) ORAL DAILY
Qty: 480 ML | Refills: 0 | Status: SHIPPED | OUTPATIENT
Start: 2024-03-18

## 2024-03-18 RX ORDER — LANSOPRAZOLE 30 MG/1
30 TABLET, ORALLY DISINTEGRATING, DELAYED RELEASE ORAL
Qty: 30 TABLET | Refills: 0 | Status: SHIPPED | OUTPATIENT
Start: 2024-03-18

## 2024-03-18 RX ORDER — FERROUS SULFATE 325(65) MG
1 TABLET ORAL 2 TIMES DAILY
Qty: 60 TABLET | Refills: 0 | Status: SHIPPED | OUTPATIENT
Start: 2024-03-18

## 2024-03-18 NOTE — TELEPHONE ENCOUNTER
I sent all his meds in, but some say po and some say per PEG- does pt still have PEG tube, because they would need crushed or we need to give liquid meds if he does, so what I sent would not be correct.

## 2024-03-18 NOTE — TELEPHONE ENCOUNTER
Patient states he needs all his meds sent over to [facility] however he was not able to verify any of the meds on his med list.    Patient is also asking for a refill of cialis which I could not find on his med list.

## 2024-03-21 ENCOUNTER — OFFICE VISIT (OUTPATIENT)
Dept: NEUROSURGERY | Age: 64
End: 2024-03-21
Payer: COMMERCIAL

## 2024-03-21 VITALS
DIASTOLIC BLOOD PRESSURE: 71 MMHG | BODY MASS INDEX: 25.48 KG/M2 | HEART RATE: 93 BPM | HEIGHT: 70 IN | WEIGHT: 178 LBS | SYSTOLIC BLOOD PRESSURE: 123 MMHG

## 2024-03-21 DIAGNOSIS — K70.30 ALCOHOLIC CIRRHOSIS, UNSPECIFIED WHETHER ASCITES PRESENT (HCC): ICD-10-CM

## 2024-03-21 DIAGNOSIS — M48.02 FORAMINAL STENOSIS OF CERVICAL REGION: ICD-10-CM

## 2024-03-21 DIAGNOSIS — G62.9 NEUROPATHY: ICD-10-CM

## 2024-03-21 DIAGNOSIS — G56.23 ULNAR NEUROPATHY OF BOTH UPPER EXTREMITIES: Primary | ICD-10-CM

## 2024-03-21 DIAGNOSIS — D64.9 ANEMIA, UNSPECIFIED TYPE: ICD-10-CM

## 2024-03-21 PROCEDURE — 99213 OFFICE O/P EST LOW 20 MIN: CPT | Performed by: NEUROLOGICAL SURGERY

## 2024-03-21 PROCEDURE — 3078F DIAST BP <80 MM HG: CPT | Performed by: NEUROLOGICAL SURGERY

## 2024-03-21 PROCEDURE — 3074F SYST BP LT 130 MM HG: CPT | Performed by: NEUROLOGICAL SURGERY

## 2024-03-21 RX ORDER — GABAPENTIN 100 MG/1
100 CAPSULE ORAL NIGHTLY
Qty: 30 CAPSULE | Refills: 10 | OUTPATIENT
Start: 2024-03-21

## 2024-03-21 RX ORDER — FUROSEMIDE 20 MG/1
20 TABLET ORAL DAILY
Qty: 30 TABLET | Refills: 10 | OUTPATIENT
Start: 2024-03-21

## 2024-03-21 RX ORDER — LACTULOSE 10 G/15ML
SOLUTION ORAL
Refills: 10 | OUTPATIENT
Start: 2024-03-21

## 2024-03-21 RX ORDER — SUCRALFATE 1 G/1
TABLET ORAL
Qty: 42 TABLET | Refills: 10 | OUTPATIENT
Start: 2024-03-21

## 2024-03-21 RX ORDER — ONDANSETRON 4 MG/1
TABLET, ORALLY DISINTEGRATING ORAL
Qty: 90 TABLET | Refills: 10 | OUTPATIENT
Start: 2024-03-21

## 2024-03-21 NOTE — TELEPHONE ENCOUNTER
Pt called in stating he is getting PEG tube removed next week and wants all meds to be PO. Pharmacy was notified of this. They are asking if pt should be on lansoprazole or omeprazole. Mary KAHN called and left a VM for pt to clarify. [facility] [phone] ext [phone]

## 2024-03-21 NOTE — TELEPHONE ENCOUNTER
Patient is having PEG removed in one week.  However, he can take all medications by mouth.  These medications are fine

## 2024-03-21 NOTE — TELEPHONE ENCOUNTER
Patient states he is having his PEG tube removed next week so he celina like the medication SIG to be corrected to reflect this. To do so we need to put refills in for each and edit the SIG on the refill.    Call placed to patient to ask which med he's taking for reflux, either pantoprazole or omeprazole. LVM asking for a call back.

## 2024-03-21 NOTE — PROGRESS NOTES
syndrome of right wrist    History of hepatitis C    Vitamin D deficiency    Pure hypercholesterolemia    Hypokalemia    Essential hypertension    Recurrent major depressive disorder in partial remission (HCC)    S/P epidural steroid injection    Elevated LFTs    Seasonal allergies    Lumbar facet arthropathy    Cervical facet syndrome    Thrombocytopenia (HCC)    Hepatitis C virus infection resolved after antiviral drug therapy    Alcohol abuse    Altered mental status    Hypocalcemia    Hypophosphatemia    Malignant neoplasm of lower third of esophagus (HCC)    COVID-19    Anxiety    Current smoker    Severe comorbid illness    Gait instability    Abnormal findings on diagnostic imaging of spine    Foraminal stenosis of cervical region    Spinal stenosis of lumbar region with neurogenic claudication    Low hemoglobin    Anemia    Hypotension    Former smoker, 50+ pack years, quit 2016    HLD (hyperlipidemia)    Esophageal adenocarcinoma (HCC)    Acute on chronic anemia    Acute kidney injury (HCC)    Ascites due to alcoholic cirrhosis (HCC)    Shortness of breath    Drop in hemoglobin    Esophageal polyp    Acute kidney failure, unspecified (HCC)    Muscle weakness (generalized)    Other abnormalities of gait and mobility    GI bleed    Goals of care, counseling/discussion    ACP (advance care planning)    Palliative care encounter    S/P TIPS (transjugular intrahepatic portosystemic shunt)    Acute metabolic encephalopathy    Lezama's esophagus with dysplasia    Mastoid disorder, bilateral    Acute deep vein thrombosis (DVT) of brachial vein of right upper extremity (HCC)    Chronic hepatitis C without hepatic coma (HCC)    Swelling of joint of upper arm, right    Anasarca    Lightheadedness    Alcohol withdrawal syndrome, with delirium (HCC)    Hemorrhage of rectum and anus    Bowel perforation (HCC)    Electrolyte imbalance    Hyponatremia    Pain of upper abdomen    Ileus (HCC)    Moderate malnutrition (HCC)

## 2024-03-22 ENCOUNTER — TELEPHONE (OUTPATIENT)
Dept: PRIMARY CARE CLINIC | Age: 64
End: 2024-03-22

## 2024-03-22 RX ORDER — FERROUS SULFATE 325(65) MG
1 TABLET ORAL 2 TIMES DAILY
Qty: 60 TABLET | Refills: 0 | OUTPATIENT
Start: 2024-03-22

## 2024-03-22 RX ORDER — LANSOPRAZOLE 30 MG/1
30 TABLET, ORALLY DISINTEGRATING, DELAYED RELEASE ORAL
Qty: 30 TABLET | Refills: 0 | OUTPATIENT
Start: 2024-03-22

## 2024-03-22 RX ORDER — GABAPENTIN 100 MG/1
100 CAPSULE ORAL NIGHTLY
Qty: 30 CAPSULE | Refills: 0 | OUTPATIENT
Start: 2024-03-22 | End: 2024-04-21

## 2024-03-22 RX ORDER — FUROSEMIDE 20 MG/1
20 TABLET ORAL DAILY
Qty: 30 TABLET | Refills: 0 | OUTPATIENT
Start: 2024-03-22

## 2024-03-22 RX ORDER — OMEPRAZOLE 20 MG/1
20 CAPSULE, DELAYED RELEASE ORAL DAILY
Qty: 30 CAPSULE | Refills: 3 | OUTPATIENT
Start: 2024-03-22

## 2024-03-22 RX ORDER — LACTULOSE 10 G/15ML
30 SOLUTION ORAL 3 TIMES DAILY
Qty: 2700 ML | Refills: 0 | OUTPATIENT
Start: 2024-03-22

## 2024-03-22 NOTE — TELEPHONE ENCOUNTER
Have patient send e visit. Dear Frank,   Thank you for contacting me. I invite you to take advantage of the [employer] Evisit platform. Compared to a simple [employer] message, the Evisit collects and documents home monitoring readings, current symptoms and possible medication side effects such that it can take the place of an office visit in many cases.  This additional information will help me to better address your condition or concern.  The maximum cost is similar to the office visit copay for most patients, and many insurers, including Medicare, now cover these visits completely. Also, it can be done at your convenience and does not require any type of scheduling.     Below are the steps for beginning and completing an Evisit:      Log into your [employer] account.   Under the appointment option, choose \"Begin an Evisit\". If you are on the nolberto, it will have a stethoscope icon you can tap on.   From the menu of Evisits, choose the reason for the visit that best fits your concern or condition. For example, you could select \"Sinus/cold/cough\", \"Urinary symptoms\", \"ADHD follow up\", etc.   Please answer all prompted questions and add comments where permitted.   After submitting the Evisit, I will follow up with you on Digify with a message that will contain a plan for you and follow up instructions.   If you try to submit an Evisit and get a message that you must contact your primary care provider, please contact our office.  We can assist you in getting the appropriate care for your condition or concern.    Sincerely,  VASU Clemente

## 2024-03-22 NOTE — TELEPHONE ENCOUNTER
Pt states that he is on both meds. Rx's are pending. Was upset that this has not been addressed yet. 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 I let him know that I needed to get the message back to Lopez, since he was not here a full day today and is usually not in on fri. Pt asked, what is he going out golfing?

## 2024-03-22 NOTE — TELEPHONE ENCOUNTER
Sent by Dr. HSIEH 4 days ago. Both meds are in same class of medication. Please see which one he is supposed to be taking.

## 2024-03-22 NOTE — TELEPHONE ENCOUNTER
Pt is nauseated and asking if you would send in something to help with that? No other symptoms.    [facility] pharmacy listed.

## 2024-03-25 ENCOUNTER — HOSPITAL ENCOUNTER (OUTPATIENT)
Dept: PREADMISSION TESTING | Age: 64
Discharge: HOME | End: 2024-03-29

## 2024-03-25 VITALS — BODY MASS INDEX: 24.34 KG/M2 | HEIGHT: 70 IN | WEIGHT: 170 LBS

## 2024-03-25 RX ORDER — MULTIVIT-MIN/FERROUS GLUCONATE 9 MG/15 ML
15 LIQUID (ML) ORAL DAILY
Qty: 480 ML | Refills: 0 | Status: ON HOLD | OUTPATIENT
Start: 2024-03-25

## 2024-03-25 NOTE — PROGRESS NOTES
you are alert and stable, you will receive instructions and be prepared for discharge.   Instructions reviewed with patient and he verbalized understanding.

## 2024-03-25 NOTE — TELEPHONE ENCOUNTER
Last visit: 2/13/24  Last Med refill: 3/18/2024  Does patient have enough medication for 72 hours:     Next Visit Date:  Future Appointments   Date Time Provider Department Center   3/25/2024  1:15 PM [facility]   4/30/2024 11:15 AM Garrison Durán MD [facility]   5/23/2024 11:30 AM Ashleigh Sotelo DO [facility]       Health Maintenance   Topic Date Due    Low dose CT lung screening &/or counseling  Never done    Respiratory Syncytial Virus (RSV) Pregnant or age 60 yrs+ (1 - 1-dose 60+ series) Never done    COVID-19 Vaccine (3 - Moderna risk series) 07/20/2021    Annual Wellness Visit (Medicare Advantage)  01/01/2024    Shingles vaccine (2 of 2) 04/11/2024    Depression Monitoring  02/14/2025    Lipids  08/19/2026    Colorectal Cancer Screen  04/16/2027    DTaP/Tdap/Td vaccine (3 - Td or Tdap) 06/15/2032    Hepatitis A vaccine  Completed    Hepatitis B vaccine  Completed    Flu vaccine  Completed    Pneumococcal 0-64 years Vaccine  Completed    Hib vaccine  Aged Out    Polio vaccine  Aged Out    Meningococcal (ACWY) vaccine  Aged Out    GFR test (Diabetes, CKD 3-4, OR last GFR 15-59)  Discontinued    Diabetes screen  Discontinued    HIV screen  Discontinued    Prostate Specific Antigen (PSA) Screening or Monitoring  Discontinued       Hemoglobin A1C (%)   Date Value   10/10/2023 3.5 (L)   01/01/2023 4.5   08/19/2021 4.2             ( goal A1C is < 7)   No components found for: \"LABMICR\"  LDL Cholesterol (mg/dL)   Date Value   08/19/2021 46   07/03/2020 103       (goal LDL is <100)   AST (U/L)   Date Value   01/29/2024 42 (H)     ALT (U/L)   Date Value   01/29/2024 10     BUN (mg/dL)   Date Value   01/29/2024 10     BP Readings from Last 3 Encounters:   03/21/24 123/71   03/15/24 113/73   03/01/24 128/78          (goal 120/80)    All Future Testing planned in [facility]  Lab Frequency Next Occurrence   CBC with Auto Differential Once 05/30/2023   Comprehensive Metabolic Panel

## 2024-03-26 ENCOUNTER — HOSPITAL ENCOUNTER (INPATIENT)
Age: 64
LOS: 2 days | Discharge: HOME | End: 2024-03-28
Attending: EMERGENCY MEDICINE | Admitting: INTERNAL MEDICINE
Payer: COMMERCIAL

## 2024-03-26 ENCOUNTER — TELEPHONE (OUTPATIENT)
Dept: GASTROENTEROLOGY | Age: 64
End: 2024-03-26

## 2024-03-26 ENCOUNTER — HOSPITAL ENCOUNTER (OUTPATIENT)
Age: 64
Discharge: HOME | End: 2024-03-26
Payer: COMMERCIAL

## 2024-03-26 DIAGNOSIS — E83.42 HYPOMAGNESEMIA: ICD-10-CM

## 2024-03-26 DIAGNOSIS — R53.1 GENERALIZED WEAKNESS: ICD-10-CM

## 2024-03-26 DIAGNOSIS — E87.6 HYPOKALEMIA: Primary | ICD-10-CM

## 2024-03-26 DIAGNOSIS — C15.9 MALIGNANT NEOPLASM OF ESOPHAGUS, UNSPECIFIED LOCATION (HCC): ICD-10-CM

## 2024-03-26 LAB
ABSOLUTE BANDS: 0.12 K/UL (ref 0–1)
ALBUMIN SERPL-MCNC: 2.8 G/DL (ref 3.5–5.2)
ALBUMIN SERPL-MCNC: 3 G/DL (ref 3.5–5.2)
ALP SERPL-CCNC: 196 U/L (ref 40–129)
ALP SERPL-CCNC: 213 U/L (ref 40–129)
ALT SERPL-CCNC: 17 U/L (ref 5–41)
ALT SERPL-CCNC: 18 U/L (ref 5–41)
ANION GAP SERPL CALCULATED.3IONS-SCNC: 12 MMOL/L (ref 9–17)
ANION GAP SERPL CALCULATED.3IONS-SCNC: 14 MMOL/L (ref 9–17)
AST SERPL-CCNC: 62 U/L
AST SERPL-CCNC: 71 U/L
BANDS: 2 % (ref 0–10)
BASOPHILS # BLD: 0 K/UL (ref 0–0.2)
BASOPHILS # BLD: 0 K/UL (ref 0–0.2)
BASOPHILS NFR BLD: 0 % (ref 0–2)
BASOPHILS NFR BLD: 0 % (ref 0–2)
BILIRUB DIRECT SERPL-MCNC: 0.9 MG/DL
BILIRUB INDIRECT SERPL-MCNC: 1.5 MG/DL (ref 0–1)
BILIRUB SERPL-MCNC: 2.4 MG/DL (ref 0.3–1.2)
BILIRUB SERPL-MCNC: 2.7 MG/DL (ref 0.3–1.2)
BUN SERPL-MCNC: 12 MG/DL (ref 8–23)
BUN SERPL-MCNC: 13 MG/DL (ref 8–23)
CALCIUM SERPL-MCNC: 8.6 MG/DL (ref 8.6–10.4)
CALCIUM SERPL-MCNC: 8.8 MG/DL (ref 8.6–10.4)
CEA SERPL-MCNC: 17.5 NG/ML (ref 0–3.8)
CHLORIDE SERPL-SCNC: 94 MMOL/L (ref 98–107)
CHLORIDE SERPL-SCNC: 94 MMOL/L (ref 98–107)
CK SERPL-CCNC: 147 U/L (ref 39–308)
CO2 SERPL-SCNC: 27 MMOL/L (ref 20–31)
CO2 SERPL-SCNC: 28 MMOL/L (ref 20–31)
CREAT SERPL-MCNC: 1 MG/DL (ref 0.7–1.2)
CREAT SERPL-MCNC: 1.1 MG/DL (ref 0.7–1.2)
EOSINOPHIL # BLD: 0 K/UL (ref 0–0.4)
EOSINOPHIL # BLD: 0.08 K/UL (ref 0–0.4)
EOSINOPHILS RELATIVE PERCENT: 0 % (ref 0–4)
EOSINOPHILS RELATIVE PERCENT: 1 % (ref 0–4)
ERYTHROCYTE [DISTWIDTH] IN BLOOD BY AUTOMATED COUNT: 14.6 % (ref 11.5–14.9)
ERYTHROCYTE [DISTWIDTH] IN BLOOD BY AUTOMATED COUNT: 14.8 % (ref 11.5–14.9)
GFR SERPL CREATININE-BSD FRML MDRD: 75 ML/MIN/1.73M2
GFR SERPL CREATININE-BSD FRML MDRD: 84 ML/MIN/1.73M2
GLUCOSE SERPL-MCNC: 130 MG/DL (ref 70–99)
GLUCOSE SERPL-MCNC: 131 MG/DL (ref 70–99)
HCT VFR BLD AUTO: 31 % (ref 41–53)
HCT VFR BLD AUTO: 33.3 % (ref 41–53)
HGB BLD-MCNC: 10.6 G/DL (ref 13.5–17.5)
HGB BLD-MCNC: 11.2 G/DL (ref 13.5–17.5)
INR PPP: 1.4
LIPASE SERPL-CCNC: 21 U/L (ref 13–60)
LYMPHOCYTES NFR BLD: 1.26 K/UL (ref 1–4.8)
LYMPHOCYTES NFR BLD: 1.39 K/UL (ref 1–4.8)
LYMPHOCYTES RELATIVE PERCENT: 16 % (ref 24–44)
LYMPHOCYTES RELATIVE PERCENT: 24 % (ref 24–44)
MAGNESIUM SERPL-MCNC: 1.1 MG/DL (ref 1.6–2.6)
MCH RBC QN AUTO: 32 PG (ref 26–34)
MCH RBC QN AUTO: 32.2 PG (ref 26–34)
MCHC RBC AUTO-ENTMCNC: 33.6 G/DL (ref 31–37)
MCHC RBC AUTO-ENTMCNC: 34.2 G/DL (ref 31–37)
MCV RBC AUTO: 94.2 FL (ref 80–100)
MCV RBC AUTO: 95.4 FL (ref 80–100)
MONOCYTES NFR BLD: 0.46 K/UL (ref 0.1–1.3)
MONOCYTES NFR BLD: 0.87 K/UL (ref 0.1–1.3)
MONOCYTES NFR BLD: 11 % (ref 1–7)
MONOCYTES NFR BLD: 8 % (ref 1–7)
MORPHOLOGY: ABNORMAL
MYOGLOBIN SERPL-MCNC: 132 NG/ML (ref 28–72)
NEUTROPHILS NFR BLD: 66 % (ref 36–66)
NEUTROPHILS NFR BLD: 72 % (ref 36–66)
NEUTS SEG NFR BLD: 3.83 K/UL (ref 1.3–9.1)
NEUTS SEG NFR BLD: 5.69 K/UL (ref 1.3–9.1)
PLATELET # BLD AUTO: 109 K/UL (ref 150–450)
PLATELET # BLD AUTO: 131 K/UL (ref 150–450)
PMV BLD AUTO: 7.2 FL (ref 6–12)
PMV BLD AUTO: 7.3 FL (ref 6–12)
POTASSIUM SERPL-SCNC: 2.4 MMOL/L (ref 3.7–5.3)
POTASSIUM SERPL-SCNC: 2.8 MMOL/L (ref 3.7–5.3)
PROT SERPL-MCNC: 6.2 G/DL (ref 6.4–8.3)
PROT SERPL-MCNC: 6.6 G/DL (ref 6.4–8.3)
PROTHROMBIN TIME: 17.4 SEC (ref 11.8–14.6)
RBC # BLD AUTO: 3.29 M/UL (ref 4.5–5.9)
RBC # BLD AUTO: 3.5 M/UL (ref 4.5–5.9)
SODIUM SERPL-SCNC: 134 MMOL/L (ref 135–144)
SODIUM SERPL-SCNC: 135 MMOL/L (ref 135–144)
TSH SERPL DL<=0.05 MIU/L-ACNC: 1.65 UIU/ML (ref 0.3–5)
WBC OTHER # BLD: 5.8 K/UL (ref 3.5–11)
WBC OTHER # BLD: 7.9 K/UL (ref 3.5–11)

## 2024-03-26 PROCEDURE — 82378 CARCINOEMBRYONIC ANTIGEN: CPT

## 2024-03-26 PROCEDURE — 6360000002 HC RX W HCPCS: Performed by: STUDENT IN AN ORGANIZED HEALTH CARE EDUCATION/TRAINING PROGRAM

## 2024-03-26 PROCEDURE — 80053 COMPREHEN METABOLIC PANEL: CPT

## 2024-03-26 PROCEDURE — 82550 ASSAY OF CK (CPK): CPT

## 2024-03-26 PROCEDURE — 99285 EMERGENCY DEPT VISIT HI MDM: CPT

## 2024-03-26 PROCEDURE — 85610 PROTHROMBIN TIME: CPT

## 2024-03-26 PROCEDURE — 36415 COLL VENOUS BLD VENIPUNCTURE: CPT

## 2024-03-26 PROCEDURE — 85025 COMPLETE CBC W/AUTO DIFF WBC: CPT

## 2024-03-26 PROCEDURE — 83690 ASSAY OF LIPASE: CPT

## 2024-03-26 PROCEDURE — 80048 BASIC METABOLIC PNL TOTAL CA: CPT

## 2024-03-26 PROCEDURE — 84443 ASSAY THYROID STIM HORMONE: CPT

## 2024-03-26 PROCEDURE — 2060000000 HC ICU INTERMEDIATE R&B

## 2024-03-26 PROCEDURE — 96375 TX/PRO/DX INJ NEW DRUG ADDON: CPT

## 2024-03-26 PROCEDURE — 93005 ELECTROCARDIOGRAM TRACING: CPT | Performed by: STUDENT IN AN ORGANIZED HEALTH CARE EDUCATION/TRAINING PROGRAM

## 2024-03-26 PROCEDURE — 99223 1ST HOSP IP/OBS HIGH 75: CPT | Performed by: INTERNAL MEDICINE

## 2024-03-26 PROCEDURE — 83735 ASSAY OF MAGNESIUM: CPT

## 2024-03-26 PROCEDURE — 80076 HEPATIC FUNCTION PANEL: CPT

## 2024-03-26 PROCEDURE — 96374 THER/PROPH/DIAG INJ IV PUSH: CPT

## 2024-03-26 PROCEDURE — 6370000000 HC RX 637 (ALT 250 FOR IP): Performed by: STUDENT IN AN ORGANIZED HEALTH CARE EDUCATION/TRAINING PROGRAM

## 2024-03-26 PROCEDURE — 83874 ASSAY OF MYOGLOBIN: CPT

## 2024-03-26 RX ORDER — SUCRALFATE 1 G/1
1 TABLET ORAL EVERY 8 HOURS SCHEDULED
Status: DISCONTINUED | OUTPATIENT
Start: 2024-03-26 | End: 2024-03-28

## 2024-03-26 RX ORDER — SODIUM CHLORIDE 9 MG/ML
INJECTION, SOLUTION INTRAVENOUS PRN
Status: DISCONTINUED | OUTPATIENT
Start: 2024-03-26 | End: 2024-03-28 | Stop reason: HOSPADM

## 2024-03-26 RX ORDER — SODIUM CHLORIDE 0.9 % (FLUSH) 0.9 %
5-40 SYRINGE (ML) INJECTION PRN
Status: DISCONTINUED | OUTPATIENT
Start: 2024-03-26 | End: 2024-03-28 | Stop reason: HOSPADM

## 2024-03-26 RX ORDER — POTASSIUM CHLORIDE 20 MEQ/1
40 TABLET, EXTENDED RELEASE ORAL PRN
Status: DISCONTINUED | OUTPATIENT
Start: 2024-03-26 | End: 2024-03-28 | Stop reason: HOSPADM

## 2024-03-26 RX ORDER — M-VIT,TX,IRON,MINS/CALC/FOLIC 27MG-0.4MG
1 TABLET ORAL DAILY
Status: DISCONTINUED | OUTPATIENT
Start: 2024-03-27 | End: 2024-03-28

## 2024-03-26 RX ORDER — POTASSIUM CHLORIDE 20MEQ/15ML
20 LIQUID (ML) ORAL DAILY
Status: ON HOLD | COMMUNITY
End: 2024-03-28

## 2024-03-26 RX ORDER — LACTULOSE 10 G/15ML
30 SOLUTION ORAL 3 TIMES DAILY
Status: DISCONTINUED | OUTPATIENT
Start: 2024-03-26 | End: 2024-03-28

## 2024-03-26 RX ORDER — ACETAMINOPHEN 650 MG/1
650 SUPPOSITORY RECTAL EVERY 6 HOURS PRN
Status: DISCONTINUED | OUTPATIENT
Start: 2024-03-26 | End: 2024-03-28 | Stop reason: HOSPADM

## 2024-03-26 RX ORDER — LANSOPRAZOLE 30 MG/1
30 TABLET, ORALLY DISINTEGRATING, DELAYED RELEASE ORAL
Status: DISCONTINUED | OUTPATIENT
Start: 2024-03-27 | End: 2024-03-28

## 2024-03-26 RX ORDER — MAGNESIUM SULFATE HEPTAHYDRATE 40 MG/ML
2000 INJECTION, SOLUTION INTRAVENOUS ONCE
Status: COMPLETED | OUTPATIENT
Start: 2024-03-26 | End: 2024-03-27

## 2024-03-26 RX ORDER — GABAPENTIN 100 MG/1
100 CAPSULE ORAL NIGHTLY
Status: DISCONTINUED | OUTPATIENT
Start: 2024-03-26 | End: 2024-03-28 | Stop reason: HOSPADM

## 2024-03-26 RX ORDER — PANTOPRAZOLE SODIUM 40 MG/1
40 TABLET, DELAYED RELEASE ORAL
Status: DISCONTINUED | OUTPATIENT
Start: 2024-03-27 | End: 2024-03-26

## 2024-03-26 RX ORDER — ONDANSETRON 2 MG/ML
4 INJECTION INTRAMUSCULAR; INTRAVENOUS EVERY 6 HOURS PRN
Status: DISCONTINUED | OUTPATIENT
Start: 2024-03-26 | End: 2024-03-28 | Stop reason: HOSPADM

## 2024-03-26 RX ORDER — ACETAMINOPHEN 325 MG/1
650 TABLET ORAL EVERY 6 HOURS PRN
Status: DISCONTINUED | OUTPATIENT
Start: 2024-03-26 | End: 2024-03-28 | Stop reason: HOSPADM

## 2024-03-26 RX ORDER — ENOXAPARIN SODIUM 100 MG/ML
40 INJECTION SUBCUTANEOUS DAILY
Status: DISCONTINUED | OUTPATIENT
Start: 2024-03-27 | End: 2024-03-28 | Stop reason: HOSPADM

## 2024-03-26 RX ORDER — MAGNESIUM SULFATE HEPTAHYDRATE 40 MG/ML
2000 INJECTION, SOLUTION INTRAVENOUS PRN
Status: DISCONTINUED | OUTPATIENT
Start: 2024-03-26 | End: 2024-03-28 | Stop reason: HOSPADM

## 2024-03-26 RX ORDER — POLYETHYLENE GLYCOL 3350 17 G/17G
17 POWDER, FOR SOLUTION ORAL DAILY PRN
Status: DISCONTINUED | OUTPATIENT
Start: 2024-03-26 | End: 2024-03-28 | Stop reason: HOSPADM

## 2024-03-26 RX ORDER — MAGNESIUM SULFATE IN WATER 40 MG/ML
4000 INJECTION, SOLUTION INTRAVENOUS ONCE
Status: COMPLETED | OUTPATIENT
Start: 2024-03-26 | End: 2024-03-26

## 2024-03-26 RX ORDER — SODIUM CHLORIDE 0.9 % (FLUSH) 0.9 %
5-40 SYRINGE (ML) INJECTION EVERY 12 HOURS SCHEDULED
Status: DISCONTINUED | OUTPATIENT
Start: 2024-03-26 | End: 2024-03-28 | Stop reason: HOSPADM

## 2024-03-26 RX ORDER — ONDANSETRON 4 MG/1
4 TABLET, ORALLY DISINTEGRATING ORAL EVERY 8 HOURS PRN
Status: DISCONTINUED | OUTPATIENT
Start: 2024-03-26 | End: 2024-03-28 | Stop reason: HOSPADM

## 2024-03-26 RX ORDER — NADOLOL 20 MG/1
10 TABLET ORAL DAILY
Status: DISCONTINUED | OUTPATIENT
Start: 2024-03-27 | End: 2024-03-28

## 2024-03-26 RX ORDER — POTASSIUM CHLORIDE 20 MEQ/1
40 TABLET, EXTENDED RELEASE ORAL ONCE
Status: COMPLETED | OUTPATIENT
Start: 2024-03-26 | End: 2024-03-27

## 2024-03-26 RX ORDER — POTASSIUM CHLORIDE 7.45 MG/ML
10 INJECTION INTRAVENOUS
Status: COMPLETED | OUTPATIENT
Start: 2024-03-26 | End: 2024-03-26

## 2024-03-26 RX ORDER — POTASSIUM CHLORIDE 7.45 MG/ML
10 INJECTION INTRAVENOUS PRN
Status: DISCONTINUED | OUTPATIENT
Start: 2024-03-26 | End: 2024-03-28 | Stop reason: HOSPADM

## 2024-03-26 RX ORDER — FERROUS SULFATE 325(65) MG
325 TABLET ORAL 2 TIMES DAILY
Status: DISCONTINUED | OUTPATIENT
Start: 2024-03-26 | End: 2024-03-28

## 2024-03-26 RX ADMIN — POTASSIUM CHLORIDE 10 MEQ: 7.46 INJECTION, SOLUTION INTRAVENOUS at 18:08

## 2024-03-26 RX ADMIN — MAGNESIUM SULFATE HEPTAHYDRATE 4000 MG: 40 INJECTION, SOLUTION INTRAVENOUS at 18:07

## 2024-03-26 RX ADMIN — POTASSIUM CHLORIDE 10 MEQ: 7.46 INJECTION, SOLUTION INTRAVENOUS at 20:17

## 2024-03-26 RX ADMIN — POTASSIUM BICARBONATE 40 MEQ: 782 TABLET, EFFERVESCENT ORAL at 16:58

## 2024-03-26 RX ADMIN — POTASSIUM CHLORIDE 10 MEQ: 7.46 INJECTION, SOLUTION INTRAVENOUS at 19:00

## 2024-03-26 ASSESSMENT — PAIN - FUNCTIONAL ASSESSMENT: PAIN_FUNCTIONAL_ASSESSMENT: NONE - DENIES PAIN

## 2024-03-26 ASSESSMENT — LIFESTYLE VARIABLES: HOW OFTEN DO YOU HAVE A DRINK CONTAINING ALCOHOL: 4 OR MORE TIMES A WEEK

## 2024-03-26 NOTE — H&P
COLONOSCOPY  10/05/2016    polyps-pathology tubular adenoma, and abnormal looking mucosa right colon-pathology-tubular adenoma    COLONOSCOPY N/A 03/30/2018    COLONOSCOPY POLYPECTOMY COLD BIOPSY performed by Augusto Cordova MD at [facility] OR    COLONOSCOPY  03/30/2018    Small polyp in the sigmoid colon and excised with biopsy forceps--tubular adenoma    COLONOSCOPY N/A 04/16/2022    COLONOSCOPY POLYPECTOMY performed by Augusto Cordova MD at [facility] OR    ENDOSCOPY, COLON, DIAGNOSTIC      EGD    ESOPHAGOGASTRODUODENOSCOPY  12/29/2022    ESOPHAGOGASTRODUODENOSCOPY N/A 01/03/2023    IR PORT PLACEMENT EQUAL OR GREATER THAN 5 YEARS  04/19/2021    IR PORT PLACEMENT EQUAL OR GREATER THAN 5 YEARS 4/19/2021 [facility] SPECIAL PROCEDURES    IR TIPS INSERTION  12/01/2022    IR TIPS INSERTION 12/1/2022 Scar VELEZ MD [facility] SPECIAL PROCEDURES    KNEE SURGERY Left     cyst removed    LAPAROTOMY N/A 03/25/2023    LAPAROTOMY EXPLORATORY, RIGHT COLECTOMY, CREATION IF END ILEOSTOMY performed by Kvng Waddell DO at [facility] OR    NASAL SEPTUM SURGERY      NASOTRACHEAL SUCTIONING  10/18/2023    NERVE BLOCK Right 11/23/2020    NERVE BLOCK RIGHT CERVICAL STEROID INJECTION  C3-C6 performed by Dheeraj Lau MD at [facility] OR    OTHER SURGICAL HISTORY  01/04/2016    steroid injection C7 T1    OTHER SURGICAL HISTORY  11/21/2016    Bilateral Lumbar CACHORRO L4-L5 injections    OTHER SURGICAL HISTORY  12/19/2016    lumbar steroid injection    OTHER SURGICAL HISTORY  09/28/2018    BILATERAL L5 CACHORRO (N/A Back)    OTHER SURGICAL HISTORY Right 11/23/2020    cervical injection    PAIN MANAGEMENT PROCEDURE Left 07/09/2020    EPIDURAL STEROID INJECTION LEFT L4 L5 performed by Dheeraj Lau MD at [facility] OR    PAIN MANAGEMENT PROCEDURE Left 07/20/2020    LEFT L4 L5 EPIDURAL STEROID INJECTION performed by Dheeraj Lau MD at [facility] OR    PAIN MANAGEMENT PROCEDURE Bilateral 08/17/2020    LUMBAR FACET BILATERAL L2-L5 performed by Dheeraj Lau MD at [facility]  chart was generated using voice recognition Dragon dictation software.  Although every effort was made to ensure the accuracy of this automated transcription, some errors in transcription may have occurred.

## 2024-03-26 NOTE — ED NOTES
Report given to JERI Nash from ED.   Report method in person   The following was reviewed with receiving RN:   Current vital signs:  BP (!) 114/59   Pulse 76   Temp 98.2 °F (36.8 °C)   Resp 18   Ht 1.778 m (5' 10\")   Wt 77.1 kg (170 lb)   SpO2 99%   BMI 24.39 kg/m²                MEWS Score: 1     Any medication or safety alerts were reviewed. Any pending diagnostics and notifications were also reviewed, as well as any safety concerns or issues, abnormal labs, abnormal imaging, and abnormal assessment findings. Questions were answered.

## 2024-03-26 NOTE — ED TRIAGE NOTES
----- Message from Bassam Corcoran MA sent at 11/27/2018  2:45 PM CST -----  Contact: self       ----- Message -----  From: Noel Lo  Sent: 11/27/2018   2:33 PM  To: [facility] Carvajal Staff    Pt is requesting a refill for oxyCODONE-acetaminophen (PERCOCET) 5-325 mg per tablet sent to [facility] [phone] (Phone)  [phone] (Fax. Pt can be reached at [phone].     Mode of arrival (squad #, walk in, police, etc) : walk-in        Chief complaint(s): abnormal lab, dizziness and weakness         Arrival Note (brief scenario, treatment PTA, etc).: patient had labs drawn and was told to come to the ER because his K+is low, 2.8. patient is dizzy and weakness         C= \"Have you ever felt that you should Cut down on your drinking?\"  No  A= \"Have people Annoyed you by criticizing your drinking?\"  No  G= \"Have you ever felt bad or Guilty about your drinking?\"  No  E= \"Have you ever had a drink as an Eye-opener first thing in the morning to steady your nerves or to help a hangover?\"  No      Deferred []      Reason for deferring: N/A    *If yes to two or more: probable alcohol abuse.*

## 2024-03-26 NOTE — TELEPHONE ENCOUNTER
Received call from lab advising us of critical potassium level, adv lab will make provider/staff aware, thanked writer call ended. Pt potassium is 2.8

## 2024-03-26 NOTE — TELEPHONE ENCOUNTER
Patient called in regards to omeperazole wondering if it could be re-ordered. Last ordered in January with no refills noted. Please advise and thank you!

## 2024-03-26 NOTE — TELEPHONE ENCOUNTER
Writer spoke with Marcella tomlinson and gave her the Procedure code of 79384 which is an EGD with removal of Gastrostomy tube

## 2024-03-26 NOTE — TELEPHONE ENCOUNTER
Marcella Stone with [facility] dept. Called needing a procedure code for EGD with Peg Tube removal. Called and left a voicemail in regards to this. Please advise.

## 2024-03-26 NOTE — ED PROVIDER NOTES
EMERGENCY DEPARTMENT ENCOUNTER   ATTENDING ATTESTATION     Pt Name: Frank Lisa  MRN: [id number]  Birthdate 1960  Date of evaluation: 3/26/24       Frank Lisa is a 63 y.o. male who presents with Fatigue, Dizziness, and Abnormal Lab      MDM:   Generalized fatigue, malaise.  Former alcoholic.  No recent drinking.  No recent falls.  Unable to ambulate with his walker.  Has a PEG tube that is coming out in a couple weeks.  Low magnesium low potassium, giving him IV supplementation, admitting him to the hospital for symptomatic hypomagnesemia and hypokalemia.    Vitals:   Vitals:    03/26/24 1531 03/26/24 1630 03/26/24 1705   BP: (!) 101/49 (!) 114/59    Pulse: 84 76    Resp: 18 18    Temp: 98.6 °F (37 °C)  98.2 °F (36.8 °C)   TempSrc: Oral     SpO2: 98% 99%    Weight: 77.1 kg (170 lb)     Height: 1.778 m (5' 10\")           I personally saw and examined the patient. I have reviewed and agree with the resident's findings, including all diagnostic interpretations and treatment plan as written. I was present for the key portions of any procedures performed and the inclusive time noted for any critical care statement.    Quinn Cage MD  Attending Emergency Physician            Quinn Cage MD  03/26/24 4906

## 2024-03-26 NOTE — PROGRESS NOTES
Pharmacy Medication History Note      List of current medications patient is taking is complete.     Source of information: patient, dispense report, [place]     Changes made to medication list:  Medications flagged for removal (include reason, ex. noncompliance):  Omeprazole - switched to lansoprazole ODT    Medications removed (include reason, ex. therapy complete or physician discontinued):  None     Medications added/doses adjusted:  Potassium chloride 20 mEq/ 15 mL give 15 mL daily     Other notes (ex. Recent course of antibiotics, Coumadin dosing):  Per [place], last fill of gabapentin was on 3/21/24 with quantity 30 for 30 days.     Denies use of other OTC or herbal medications.      Allergies clarified    Medication list provided to the patient: no  Medication education provided to the patient: none    Prior to Admission Medications   Prescriptions Last Dose Informant Patient Reported? Taking?   Multiple Vitamins-Minerals (CENTRUM/CERTA-LUCILLE WITH MINERALS ORAL) solution   No No   Sig: 15 mLs by Per G Tube route daily   ferrous sulfate (FEROSUL) 325 (65 Fe) MG tablet   No No   Si tablet by PEG Tube route 2 times daily   furosemide (LASIX) 20 MG tablet   No No   Sig: Take 1 tablet by mouth daily   gabapentin (NEURONTIN) 100 MG capsule   No No   Sig: Take 1 capsule by mouth nightly for 30 days.   lactulose (CHRONULAC) 10 GM/15ML solution   No No   Si mLs by PEG Tube route 3 times daily Hold for > 3 bms/day   lansoprazole (PREVACID SOLUTAB) 30 MG disintegrating tablet   No No   Si tablet by Per G Tube route every morning (before breakfast)   nadolol (CORGARD) 20 MG tablet   No No   Si.5 tablets by Per G Tube route daily   omeprazole (PRILOSEC) 20 MG delayed release capsule Not Taking  Yes No   Sig: Take 1 capsule by mouth daily Peg-Tube   Patient not taking: Reported on 3/26/2024   ondansetron (ZOFRAN-ODT) 4 MG disintegrating tablet   No No   Sig: dissolve 1 tablet by mouth every 8 hours if needed

## 2024-03-26 NOTE — ED PROVIDER NOTES
[facility] ED  Emergency Department Encounter  Emergency Medicine Resident      Pt Name:Frank Lisa  MRN: [id number]  Birthdate 1960  Date of evaluation: 3/26/24  PCP:  Lopez Rosario PA  Note Started: 4:03 PM EDT        CHIEF COMPLAINT            Chief Complaint   Patient presents with    Fatigue    Dizziness    Abnormal Lab         HISTORY OF PRESENT ILLNESS  (Location/Symptom, Timing/Onset, Context/Setting, Quality, Duration, Modifying Factors, Severity.)       Frank Lisa is a 63 y.o. male past medical history of DVT, liver cirrhosis secondary to hepatitis C (s/p treatment) and alcohol dependence s/p TIPS, esophageal adenocarcinoma status post radiation therapy 2021, right hemicolectomy, previously PEG tube dependent presenting for assessment of generalized weakness. Patient states that his outpatient labs were remarkable for potassium 2.8. He reports this has happened in the past, and he develops significant weakness and difficulty ambulating. He is now using a walker to ambulate over the last 48 hours, but today he notes this has become increasingly difficult.      PAST MEDICAL / SURGICAL / SOCIAL / FAMILY HISTORY       has a past medical history of Abnormal EKG, Acute deep vein thrombosis (DVT) of brachial vein of right upper extremity (HCC), 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), Alcoholic cirrhosis of liver with ascites (HCC), Anemia, Anxiety, Arthritis, Back pain, chronic, Lezama esophagus, Bleeding gastric varices, BPH (benign prostatic hypertrophy), Cholelithiasis, Cirrhosis (HCC), COVID-19, COVID-19 vaccine series completed, DDD (degenerative disc disease), lumbar, Depression, Esophageal cancer (HCC), Esophageal varices (HCC), Fatty liver, GERD (gastroesophageal reflux disease), GI bleed, Heart murmur, Hep C w/o coma, chronic (HCC), Hiatal hernia, History of alcohol abuse, History of blood transfusion, History of colon polyps, History of tobacco abuse, [place] (hard of hearing),  RESULTS / EMERGENCY DEPARTMENT COURSE / MDM      Medical Decision Making        EKG   Rhythm: normal sinus   Rate: normal  Axis: normal  Ectopy: none  Conduction: normal  ST Segments: no acute change  T Waves: no acute change  Q Waves: none    Clinical Impression: Prolonged Qtc 522ms; Normal sinus rhythm       All EKG's are interpreted by the Emergency Department Physician who either signs or Co-signs this chart in the absence of a cardiologist.     EMERGENCY DEPARTMENT COURSE:  Patient presents emergency department with unremarkable vital signs.  Complaining of generalized fatigue.  Outpatient labs demonstrating a hemolyzed specimen with potassium of 2.8.  Will repeat here.  Administer 40 orally.  Plan for likely admission.    5:49 PM  Magnesium 1.1, potassium 2.4  CBC, LFT at baseline otherwise.  CK negative.  TSH within normal limits.    Patient will be started on oral and IV potassium replenishment, 4 g of magnesium IV ordered.  Plan for admission.     PROCEDURES:       CONSULTS:  None     CRITICAL CARE:  There was significant risk of life threatening deterioration of patient's condition requiring my direct management. Critical care time  minutes, excluding any documented procedures.     FINAL IMPRESSION       Hypokalemia; hypomagnesemia; generalized weakness     DISPOSITION / PLAN      DISPOSITION          PATIENT REFERRED TO:  No follow-up provider specified.     DISCHARGE MEDICATIONS:      New Prescriptions     No medications on file         Lisandro Rich MD  Emergency Medicine Resident     (Please note that portions of this note were completed with a voice recognition program.  Efforts were made to edit the dictations but occasionally words are mis-transcribed.)

## 2024-03-26 NOTE — TELEPHONE ENCOUNTER
Writer reached out to Nilam in regards to Frank, advised her of critical potassium level and advised for him to go to the ER, stated understanding and said will get patient to the ER, thanked writer call ended.

## 2024-03-26 NOTE — ED PROVIDER NOTES
[facility] ED  Emergency Department Encounter  Emergency Medicine Resident     Pt Name:Frank Lisa  MRN: [id number]  Birthdate 1960  Date of evaluation: 3/26/24  PCP:  Lopez Rosario PA  Note Started: 4:03 PM EDT      CHIEF COMPLAINT       Chief Complaint   Patient presents with   • Fatigue   • Dizziness   • Abnormal Lab       HISTORY OF PRESENT ILLNESS  (Location/Symptom, Timing/Onset, Context/Setting, Quality, Duration, Modifying Factors, Severity.)      Frank Lisa is a 63 y.o. male past medical history of DVT, liver cirrhosis secondary to hepatitis C (s/p treatment) and alcohol dependence s/p TIPS, esophageal adenocarcinoma status post radiation therapy 2021, right hemicolectomy, previously PEG tube dependent presenting for assessment of generalized weakness. Patient states that his outpatient labs were remarkable for potassium 2.8. He reports this has happened in the past, and he develops significant weakness and difficulty ambulating. He is now using a walker to ambulate over the last 48 hours, but today he notes this has become increasingly difficult.     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), Alcoholic cirrhosis of liver with ascites (HCC), Anemia, Anxiety, Arthritis, Back pain, chronic, Lezama esophagus, Bleeding gastric varices, BPH (benign prostatic hypertrophy), Cholelithiasis, Cirrhosis (HCC), COVID-19, COVID-19 vaccine series completed, DDD (degenerative disc disease), lumbar, Depression, Esophageal cancer (HCC), Esophageal varices (HCC), Fatty liver, GERD (gastroesophageal reflux disease), GI bleed, Heart murmur, Hep C w/o coma, chronic (HCC), Hiatal hernia, History of alcohol abuse, History of blood transfusion, History of colon polyps, History of tobacco abuse, [place] (hard of hearing), Hyperlipidemia,

## 2024-03-27 PROBLEM — C15.9 MALIGNANT NEOPLASM OF ESOPHAGUS (HCC): Status: ACTIVE | Noted: 2024-03-27

## 2024-03-27 PROBLEM — R97.0 HIGH CARCINOEMBRYONIC ANTIGEN (CEA): Status: ACTIVE | Noted: 2024-03-27

## 2024-03-27 LAB
ALBUMIN SERPL-MCNC: 2.9 G/DL (ref 3.5–5.2)
ALP SERPL-CCNC: 186 U/L (ref 40–129)
ALT SERPL-CCNC: 16 U/L (ref 5–41)
ANION GAP SERPL CALCULATED.3IONS-SCNC: 10 MMOL/L (ref 9–17)
AST SERPL-CCNC: 56 U/L
BASOPHILS # BLD: 0 K/UL (ref 0–0.2)
BASOPHILS NFR BLD: 0 % (ref 0–2)
BILIRUB DIRECT SERPL-MCNC: 0.7 MG/DL
BILIRUB DIRECT SERPL-MCNC: 0.9 MG/DL
BILIRUB INDIRECT SERPL-MCNC: 1.3 MG/DL (ref 0–1)
BILIRUB SERPL-MCNC: 1.6 MG/DL (ref 0.3–1.2)
BILIRUB SERPL-MCNC: 2.2 MG/DL (ref 0.3–1.2)
BUN SERPL-MCNC: 10 MG/DL (ref 8–23)
CALCIUM SERPL-MCNC: 8.4 MG/DL (ref 8.6–10.4)
CHLORIDE SERPL-SCNC: 97 MMOL/L (ref 98–107)
CO2 SERPL-SCNC: 28 MMOL/L (ref 20–31)
CREAT SERPL-MCNC: 0.8 MG/DL (ref 0.7–1.2)
EOSINOPHIL # BLD: 0.11 K/UL (ref 0–0.4)
EOSINOPHILS RELATIVE PERCENT: 2 % (ref 0–4)
ERYTHROCYTE [DISTWIDTH] IN BLOOD BY AUTOMATED COUNT: 14.9 % (ref 11.5–14.9)
GFR SERPL CREATININE-BSD FRML MDRD: >90 ML/MIN/1.73M2
GLUCOSE SERPL-MCNC: 124 MG/DL (ref 70–99)
HAPTOGLOB SERPL-MCNC: 69 MG/DL (ref 30–200)
HCT VFR BLD AUTO: 29.1 % (ref 41–53)
HGB BLD-MCNC: 10 G/DL (ref 13.5–17.5)
IMM RETICS NFR: 22.6 % (ref 2.7–18.3)
LDH SERPL-CCNC: 113 U/L (ref 135–225)
LYMPHOCYTES NFR BLD: 0.5 K/UL (ref 1–4.8)
LYMPHOCYTES RELATIVE PERCENT: 9 % (ref 24–44)
MAGNESIUM SERPL-MCNC: 2.9 MG/DL (ref 1.6–2.6)
MCH RBC QN AUTO: 32.5 PG (ref 26–34)
MCHC RBC AUTO-ENTMCNC: 34.3 G/DL (ref 31–37)
MCV RBC AUTO: 94.5 FL (ref 80–100)
MONOCYTES NFR BLD: 0.94 K/UL (ref 0.1–1.3)
MONOCYTES NFR BLD: 17 % (ref 1–7)
MORPHOLOGY: NORMAL
NEUTROPHILS NFR BLD: 72 % (ref 36–66)
NEUTS SEG NFR BLD: 3.95 K/UL (ref 1.3–9.1)
PLATELET # BLD AUTO: 105 K/UL (ref 150–450)
PMV BLD AUTO: 7.4 FL (ref 6–12)
POTASSIUM SERPL-SCNC: 2.8 MMOL/L (ref 3.7–5.3)
POTASSIUM SERPL-SCNC: 3.4 MMOL/L (ref 3.7–5.3)
POTASSIUM SERPL-SCNC: 3.6 MMOL/L (ref 3.7–5.3)
PROT SERPL-MCNC: 6.1 G/DL (ref 6.4–8.3)
RBC # BLD AUTO: 3.08 M/UL (ref 4.5–5.9)
RETIC HEMOGLOBIN: 35.1 PG (ref 28.2–35.7)
RETICS # AUTO: 0.07 M/UL (ref 0.03–0.08)
RETICS/RBC NFR AUTO: 2.5 % (ref 0.5–1.9)
SODIUM SERPL-SCNC: 135 MMOL/L (ref 135–144)
WBC OTHER # BLD: 5.5 K/UL (ref 3.5–11)

## 2024-03-27 PROCEDURE — 97166 OT EVAL MOD COMPLEX 45 MIN: CPT

## 2024-03-27 PROCEDURE — 83010 ASSAY OF HAPTOGLOBIN QUANT: CPT

## 2024-03-27 PROCEDURE — 97161 PT EVAL LOW COMPLEX 20 MIN: CPT

## 2024-03-27 PROCEDURE — 99233 SBSQ HOSP IP/OBS HIGH 50: CPT | Performed by: INTERNAL MEDICINE

## 2024-03-27 PROCEDURE — 36415 COLL VENOUS BLD VENIPUNCTURE: CPT

## 2024-03-27 PROCEDURE — 6370000000 HC RX 637 (ALT 250 FOR IP): Performed by: INTERNAL MEDICINE

## 2024-03-27 PROCEDURE — 2060000000 HC ICU INTERMEDIATE R&B

## 2024-03-27 PROCEDURE — 84132 ASSAY OF SERUM POTASSIUM: CPT

## 2024-03-27 PROCEDURE — 83615 LACTATE (LD) (LDH) ENZYME: CPT

## 2024-03-27 PROCEDURE — 80076 HEPATIC FUNCTION PANEL: CPT

## 2024-03-27 PROCEDURE — 85025 COMPLETE CBC W/AUTO DIFF WBC: CPT

## 2024-03-27 PROCEDURE — 80048 BASIC METABOLIC PNL TOTAL CA: CPT

## 2024-03-27 PROCEDURE — 82247 BILIRUBIN TOTAL: CPT

## 2024-03-27 PROCEDURE — 6360000002 HC RX W HCPCS: Performed by: INTERNAL MEDICINE

## 2024-03-27 PROCEDURE — 85045 AUTOMATED RETICULOCYTE COUNT: CPT

## 2024-03-27 PROCEDURE — 82248 BILIRUBIN DIRECT: CPT

## 2024-03-27 PROCEDURE — 83735 ASSAY OF MAGNESIUM: CPT

## 2024-03-27 PROCEDURE — 2500000003 HC RX 250 WO HCPCS: Performed by: INTERNAL MEDICINE

## 2024-03-27 PROCEDURE — 2580000003 HC RX 258: Performed by: INTERNAL MEDICINE

## 2024-03-27 PROCEDURE — 99223 1ST HOSP IP/OBS HIGH 75: CPT | Performed by: INTERNAL MEDICINE

## 2024-03-27 RX ORDER — POTASSIUM CHLORIDE 20 MEQ/1
40 TABLET, EXTENDED RELEASE ORAL 3 TIMES DAILY
Status: DISCONTINUED | OUTPATIENT
Start: 2024-03-27 | End: 2024-03-28 | Stop reason: HOSPADM

## 2024-03-27 RX ADMIN — SUCRALFATE 1 G: 1 TABLET ORAL at 00:17

## 2024-03-27 RX ADMIN — GABAPENTIN 100 MG: 100 CAPSULE ORAL at 00:17

## 2024-03-27 RX ADMIN — FERROUS SULFATE TAB 325 MG (65 MG ELEMENTAL FE) 325 MG: 325 (65 FE) TAB at 21:13

## 2024-03-27 RX ADMIN — ONDANSETRON 4 MG: 2 INJECTION INTRAMUSCULAR; INTRAVENOUS at 19:30

## 2024-03-27 RX ADMIN — POTASSIUM CHLORIDE 40 MEQ: 1500 TABLET, EXTENDED RELEASE ORAL at 10:23

## 2024-03-27 RX ADMIN — GABAPENTIN 100 MG: 100 CAPSULE ORAL at 21:13

## 2024-03-27 RX ADMIN — LANSOPRAZOLE 30 MG: 30 TABLET, ORALLY DISINTEGRATING ORAL at 08:38

## 2024-03-27 RX ADMIN — LACTULOSE 30 G: 20 SOLUTION ORAL at 21:13

## 2024-03-27 RX ADMIN — SODIUM CHLORIDE, PRESERVATIVE FREE 10 ML: 5 INJECTION INTRAVENOUS at 21:14

## 2024-03-27 RX ADMIN — POTASSIUM CHLORIDE 40 MEQ: 1500 TABLET, EXTENDED RELEASE ORAL at 12:23

## 2024-03-27 RX ADMIN — LACTULOSE 30 G: 20 SOLUTION ORAL at 08:38

## 2024-03-27 RX ADMIN — Medication 1 TABLET: at 08:38

## 2024-03-27 RX ADMIN — SUCRALFATE 1 G: 1 TABLET ORAL at 06:09

## 2024-03-27 RX ADMIN — POTASSIUM CHLORIDE 10 MEQ: 7.46 INJECTION, SOLUTION INTRAVENOUS at 06:09

## 2024-03-27 RX ADMIN — SUCRALFATE 1 G: 1 TABLET ORAL at 12:06

## 2024-03-27 RX ADMIN — POTASSIUM CHLORIDE 10 MEQ: 7.46 INJECTION, SOLUTION INTRAVENOUS at 04:33

## 2024-03-27 RX ADMIN — SUCRALFATE 1 G: 1 TABLET ORAL at 21:14

## 2024-03-27 RX ADMIN — POTASSIUM CHLORIDE 40 MEQ: 1500 TABLET, EXTENDED RELEASE ORAL at 21:13

## 2024-03-27 RX ADMIN — LACTULOSE 30 G: 20 SOLUTION ORAL at 00:17

## 2024-03-27 RX ADMIN — ENOXAPARIN SODIUM 40 MG: 100 INJECTION SUBCUTANEOUS at 08:37

## 2024-03-27 RX ADMIN — MAGNESIUM SULFATE HEPTAHYDRATE 2000 MG: 40 INJECTION, SOLUTION INTRAVENOUS at 00:23

## 2024-03-27 RX ADMIN — FERROUS SULFATE TAB 325 MG (65 MG ELEMENTAL FE) 325 MG: 325 (65 FE) TAB at 08:38

## 2024-03-27 RX ADMIN — ONDANSETRON 4 MG: 2 INJECTION INTRAMUSCULAR; INTRAVENOUS at 12:06

## 2024-03-27 RX ADMIN — NADOLOL 10 MG: 20 TABLET ORAL at 08:38

## 2024-03-27 RX ADMIN — POTASSIUM CHLORIDE 10 MEQ: 7.46 INJECTION, SOLUTION INTRAVENOUS at 08:36

## 2024-03-27 RX ADMIN — POTASSIUM CHLORIDE 40 MEQ: 1500 TABLET, EXTENDED RELEASE ORAL at 00:17

## 2024-03-27 RX ADMIN — FERROUS SULFATE TAB 325 MG (65 MG ELEMENTAL FE) 325 MG: 325 (65 FE) TAB at 00:17

## 2024-03-27 NOTE — ED NOTES
TRANSFER - OUT REPORT:    Verbal report given to Loraine Veliz on Frank Lisa  being transferred to 2118 for routine progression of patient care       Report consisted of patient's Situation, Background, Assessment and   Recommendations(SBAR).     Information from the following report(s) ED Encounter Summary was reviewed with the receiving nurse.    Adrianna Fall Assessment:    Presents to emergency department  because of falls (Syncope, seizure, or loss of consciousness): No  Age > 70: No  Altered Mental Status, Intoxication with alcohol or substance confusion (Disorientation, impaired judgment, poor safety awaremess, or inability to follow instructions): No  Impaired Mobility: Ambulates or transfers with assistive devices or assistance; Unable to ambulate or transer.: Yes  Nursing Judgement: Yes          Lines:   Peripheral IV 03/26/24 Right;Ventral Forearm (Active)        Opportunity for questions and clarification was provided.      Patient transported with:  Tech

## 2024-03-27 NOTE — CARE COORDINATION
Case Management Assessment  Initial Evaluation    Date/Time of Evaluation: 3/27/2024 12:23 PM  Assessment Completed by: Mary Ellen Resendez RN    If patient is discharged prior to next notation, then this note serves as note for discharge by case management.    Patient Name: Frank Lisa                   YOB: 1960  Diagnosis: Hypokalemia [E87.6]  Hypomagnesemia [E83.42]  Generalized weakness [R53.1]                   Date / Time: 3/26/2024  4:01 PM    Patient Admission Status: Inpatient   Readmission Risk (Low < 19, Mod (19-27), High > 27): Readmission Risk Score: 30    Current PCP: Lopez Rosario PA  PCP verified by CM? No    Chart Reviewed: Yes      History Provided by: Patient  Patient Orientation: Alert and Oriented    Patient Cognition: Alert    Hospitalization in the last 30 days (Readmission):  No    If yes, Readmission Assessment in CM Navigator will be completed.    Advance Directives:      Code Status: Full Code   Patient's Primary Decision Maker is: Legal Next of Kin    Primary Decision Maker: Nilam Lisa - Ex-Spouse - [phone]    Discharge Planning:    Patient lives with: Alone Type of Home: House  Primary Care Giver: Self  Patient Support Systems include: Other (Comment)   Current Financial resources: Medicare  Current community resources: None  Current services prior to admission: None            Current DME:              Type of Home Care services:  None    ADLS  Prior functional level: Independent in ADLs/IADLs  Current functional level: Independent in ADLs/IADLs    PT AM-PAC:   /24  OT AM-PAC:   /24    Family can provide assistance at DC: Yes  Would you like Case Management to discuss the discharge plan with any other family members/significant others, and if so, who? No  Plans to Return to Present Housing: Yes  Other Identified Issues/Barriers to RETURNING to current housing: bno  Potential Assistance needed at discharge: N/A            Potential DME:    Patient expects  to discharge to: House  Plan for transportation at discharge: Family    Financial    Payor: JOYCE PARKER DUAL BENEFITS / Plan: [facility]WE[facility] FROM [facility] PLAN / Product Type: *No Product type* /     Does insurance require precert for SNF: Yes    Potential assistance Purchasing Medications: No  Meds-to-Beds request:        [id number] - [place], [place] - [address] -  [phone] - F [phone]  [address] [place] [address]  Phone: [phone] Fax: [phone]    [id number] - [place], [place] - [address] -  [phone] - F [phone]  [address] [place] [address]  Phone: [phone] Fax: [phone]    [facility], [place] - [address] [phone] - F [phone]  [address] [address]  Phone: [phone] Fax: [phone]      Notes:    Factors facilitating achievement of predicted outcomes: Family support, Motivated, Cooperative, and Pleasant    Barriers to discharge: Medical complications    Additional Case Management Notes: 3/27/24 Joyce Parker Pt is from home in a one story home alone DME walker and cane [facility] will send a referral. Plan is to discharge to home with no needs. .//tv        The Plan for Transition of Care is related to the following treatment goals of Hypokalemia [E87.6]  Hypomagnesemia [E83.42]  Generalized weakness [R53.1]    IF APPLICABLE: The Patient and/or patient representative Frank and his family were provided with a choice of provider and agrees with the discharge plan. Freedom of choice list with basic dialogue that supports the patient's individualized plan of care/goals and shares the quality data associated with the providers was provided to: Patient   Patient Representative Name:       The Patient and/or Patient Representative Agree with the Discharge Plan? Yes    Mary Ellen Resendez RN  Case Management Department  Ph:  Fax:

## 2024-03-27 NOTE — PROGRESS NOTES
Physical Therapy  Facility/Department: [facility] PROGRESSIVE CARE  Physical Therapy Initial Assessment    Name: Frank Lisa  : 1960  MRN: [id number]  Date of Service: 3/27/2024    Discharge Recommendations:  Patient would benefit from continued therapy after discharge   PT Equipment Recommendations  Equipment Needed:  (CTA: pt reports on \"Bad days\" uses RW, on \"good days\" No AD)      Patient Diagnosis(es): The primary encounter diagnosis was Hypokalemia. Diagnoses of Generalized weakness and Hypomagnesemia were also pertinent to this visit.  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), Alcoholic cirrhosis of liver with ascites (HCC), Anemia, Anxiety, Arthritis, Back pain, chronic, Lezama esophagus, Bleeding gastric varices, BPH (benign prostatic hypertrophy), Cholelithiasis, Cirrhosis (HCC), COVID-19, COVID-19 vaccine series completed, DDD (degenerative disc disease), lumbar, Depression, Esophageal cancer (HCC), Esophageal varices (HCC), Fatty liver, GERD (gastroesophageal reflux disease), GI bleed, Heart murmur, Hep C w/o coma, chronic (HCC), Hiatal hernia, History of alcohol abuse, History of blood transfusion, History of colon polyps, History of tobacco abuse, [place] (hard of hearing), Hyperlipidemia, Hypertension, Hyponatremia, Hypotension, Mastoid disorder, bilateral, Pericardial effusion, PONV (postoperative nausea and vomiting), Poor venous access, Port-A-Cath in place, Portal hypertension (HCC), Sciatica, Secondary esophageal varices (HCC), Shortness of breath, Spinal stenosis, Stomach ulcer, Thrombocytopenia (HCC), Tubular adenoma of colon, Under care of team, Under care of team, Vitamin D deficiency, and Wears glasses.  Past Surgical History:  has a past surgical history that includes Bunionectomy; Nasal septum surgery; other surgical history (2016); Colonoscopy;

## 2024-03-27 NOTE — PROGRESS NOTES
Patient states he came into ER with his cell phone, RN on floor did not see any phone upon admission and no cell phone is in his room with him. Patient states he used his phone in ER. RN called ER and spoke with karri about patients phone and ER is checking the room that the patient was in while downstairs. RN has called patients phone 3 times to see if it can be heard anywhere, no sign of phone at this time.

## 2024-03-27 NOTE — PLAN OF CARE
Problem: Discharge Planning  Goal: Discharge to home or other facility with appropriate resources  3/26/2024 2316 by Loraine Alfred RN  Note: Patient will achieve and maintain electrolytes WNL before discharge     Problem: Safety - Adult  Goal: Free from fall injury  3/26/2024 2316 by Loraine Alfred RN  Flowsheets (Taken 3/26/2024 2316)  Free From Fall Injury: Instruct family/caregiver on patient safety  Note: Patient educated on using call light, bed low and locked, two side rails up, bed alarm on, gripper socks on and fall wrist band in place     Problem: Safety - Adult  Goal: Free from fall injury  3/26/2024 2316 by Loraine Alfred RN  Outcome: Progressing  Flowsheets (Taken 3/26/2024 2316)  Free From Fall Injury: Instruct family/caregiver on patient safety     Problem: Chronic Conditions and Co-morbidities  Goal: Patient's chronic conditions and co-morbidity symptoms are monitored and maintained or improved  3/26/2024 2316 by Loraine Alfred RN  Flowsheets (Taken 3/26/2024 2316)  Care Plan - Patient's Chronic Conditions and Co-Morbidity Symptoms are Monitored and Maintained or Improved:   Monitor and assess patient's chronic conditions and comorbid symptoms for stability, deterioration, or improvement   Collaborate with multidisciplinary team to address chronic and comorbid conditions and prevent exacerbation or deterioration  Note: Oncology consulted to follow patient abnormal lab work     Problem: Chronic Conditions and Co-morbidities  Goal: Patient's chronic conditions and co-morbidity symptoms are monitored and maintained or improved  3/26/2024 2316 by Loraine Alfred RN  Outcome: Progressing  Flowsheets (Taken 3/26/2024 2316)  Care Plan - Patient's Chronic Conditions and Co-Morbidity Symptoms are Monitored and Maintained or Improved:   Monitor and assess patient's chronic conditions and comorbid symptoms for stability, deterioration, or improvement   Collaborate with multidisciplinary team to address

## 2024-03-27 NOTE — CONSULTS
Today's Date: 3/27/2024  Patient Name: Frank Lisa  Date of admission: 3/26/2024  4:01 PM  Patient's age: 63 y.o., 1960  Admission Dx: Hypokalemia [E87.6]  Hypomagnesemia [E83.42]  Generalized weakness [R53.1]    Reason for Consult: High CEA/esophageal cancer  Requesting Physician: Kathi Russo MD    CHIEF COMPLAINT: Fatigue dizziness    History Obtained From:  patient    HISTORY OF PRESENT ILLNESS:      The patient is a 63 y.o.  male who is admitted to the hospital for dizziness fatigue patient does have history of liver cirrhosis secondary to alcohol use and hepatitis C history of TIPS, history of esophageal cancer he does have history also of dysphagia status post PEG tube most recent EGD was done on January 9, 2024 no evidence of cancer recurrence patient was admitted for hypomagnesemia and hypokalemia and oncology consulted for rising CEA, no recent colonoscopy patient follow-up with oncology group Dr. Galicia  Platelet down to 105    Oncology history  Esophageal cancer [id number]  Stage II   started on concurrent chemoradiation preoperatively on 5/4/21  Chemoradiation completed 6/9/21  Patient had a PET scan on 7/23/2021 which showed no evidence of recurrence  Patient has been evaluated by thoracic surgeon at [facility] Dr. Velásquez and also hepatologist Dr. Sheffield  At [place] his case was discussed at thoracic tumor oncology board with recommendations NOT to do any surgery because of his liver failure.  So surveillance recommended  He had an upper endoscopy on 8/31/21 which showed no residual cancer but showed Lezama's esophagus with high-grade dysplasia.   Another endoscopy on 1/21/2022 was negative for recurrence  His CEA level is rising therefore PET scan was done on 10/26/2023 showed mild metabolic activity in the distal esophagus and no obvious recurrence noted  Patient had a EGD on 1/9/2024 and the biopsy showed no evidence of recurrence      Past Medical History:   has a past  management procedure (Left, 07/09/2020); Pain management procedure (Left, 07/20/2020); Pain management procedure (Bilateral, 08/17/2020); other surgical history (Right, 11/23/2020); Nerve Block (Right, 11/23/2020); Pain management procedure (Bilateral, 12/07/2020); Upper gastrointestinal endoscopy (N/A, 12/29/2020); Upper gastrointestinal endoscopy (N/A, 02/02/2021); Upper gastrointestinal endoscopy (N/A, 02/12/2021); Upper gastrointestinal endoscopy (02/12/2021); Roy tooth extraction; IR PORT PLACEMENT > 5 YEARS (04/19/2021); Upper gastrointestinal endoscopy (N/A, 08/31/2021); Upper gastrointestinal endoscopy (N/A, 01/21/2022); Upper gastrointestinal endoscopy (N/A, 04/15/2022); Colonoscopy (N/A, 04/16/2022); Upper gastrointestinal endoscopy (N/A, 06/06/2022); Upper gastrointestinal endoscopy (N/A, 06/09/2022); Paracentesis; Upper gastrointestinal endoscopy (N/A, 09/14/2022); Upper gastrointestinal endoscopy (N/A, 10/19/2022); Upper gastrointestinal endoscopy (N/A, 10/31/2022); IR TIPS INSERTION (12/01/2022); Esophagogastroduodenoscopy (12/29/2022); Upper gastrointestinal endoscopy (12/29/2022); Esophagogastroduodenoscopy (N/A, 01/03/2023); Upper gastrointestinal endoscopy (N/A, 01/03/2023); Upper gastrointestinal endoscopy (N/A, 01/26/2023); thoracentesis; Upper gastrointestinal endoscopy (N/A, 02/13/2023); Upper gastrointestinal endoscopy (N/A, 03/17/2023); laparotomy (N/A, 03/25/2023); Upper gastrointestinal endoscopy (N/A, 08/02/2023); Colon surgery (10/10/2023); Small intestine surgery (N/A, 10/10/2023); RT suctioning (10/18/2023); and Upper gastrointestinal endoscopy (N/A, 1/9/2024).     Medications:    Reviewed in Epic     Allergies:  Pollen extract    Social History:   reports that he quit smoking about 7 years ago. His smoking use included cigarettes. He started smoking about 52 years ago. He has a 45.0 pack-year smoking history. He has never used smokeless tobacco. He reports current alcohol use.

## 2024-03-27 NOTE — PROGRESS NOTES
03/27/24 1252   Encounter Summary   Encounter Overview/Reason  Volunteer Encounter   Service Provided For: Patient   Referral/Consult From: Ralph   Last Encounter  03/27/24   Complexity of Encounter Low   Spiritual/Emotional needs   Type Spiritual Support   Assessment/Intervention/Outcome   Intervention Prayer (assurance of)/[place]

## 2024-03-27 NOTE — PROCEDURES
recommended.  Per MBS report: Oral Preparation / Oral Phase: Impaired  Pt lethargic for assessment.  Pt unable to sip from straw or drink from cup independently. Patient presents with safe swallow for Dysphagia Pureed (Dysphagia I)  diet with Mildly Thick (Nectar) liquids as evidenced by no observed aspiration noted with consistencies tested.  Recommend small sips and bites, only feed when alert and awake and upright at 90 degrees for all PO intake.  Recommend close monitoring for overt/clinical s/s of aspiration and D/C PO intake and complete Modified Barium Swallow Study should they occur.  Results and recommendations reported to patient.        Pharyngeal Phase: Impaired  Puree: + spillover to vallec with no penetration no aspiration mild vallec and pyriform stasis  Soft Solids: + spillover to vallec  + trace penetration no aspiration min vallec and pyriform stasis  Thin tsp: + spillover with + penetration and + aspiration before swallow + latent cough  Nectar tsp/cup: + spillover no penetration no aspiration mild vallec and pyriform stasis    Recent CXR/CT of Chest: N/A    Patient Complaints/Reason for Referral:  Frank Lisa was referred for a MBS to assess the efficiency of his/her swallow function, assess for aspiration, and to make recommendations regarding safe dietary consistencies, effective compensatory strategies, and safe eating environment.  Patient complaints: Pt reports baseline cough.    Onset of problem: Per H&P:  63-year-old gentleman with a history of alcohol abuse history of alcoholic cirrhosis history of Lezama's esophagus and bleeding gastric varices active drinker on daily basis patient claims his last drink was Saturday since then he has been having nausea and vomiting initially now persistent intractable nausea unable to eat or drink at risk for dehydration lives alone with his dog was too weak to walk feed himself or hydrate himself  Patient noted to have hypokalemia potassium 3.3 
For information on Fall & Injury Prevention, visit: [email or web address] OR  [email or web address] OR  https://www.cdc.gov/steadi/patient.html

## 2024-03-27 NOTE — PROGRESS NOTES
[facility]   Occupational Therapy Evaluation  Date: 3/27/24  Patient Name: Frank Lisa       Room: 2118/2118-01  MRN: [id number]  Account: [id number]   : 1960  (63 y.o.) Gender: male     Discharge Recommendations:  Further Occupational Therapy is recommended upon facility discharge.    OT Equipment Recommendations  Other: TBD    Referring Practitioner: Kathi Russo MD  Diagnosis: Hypokalemia        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), Alcoholic cirrhosis of liver with ascites (HCC), Anemia, Anxiety, Arthritis, Back pain, chronic, Lezama esophagus, Bleeding gastric varices, BPH (benign prostatic hypertrophy), Cholelithiasis, Cirrhosis (HCC), COVID-19, COVID-19 vaccine series completed, DDD (degenerative disc disease), lumbar, Depression, Esophageal cancer (HCC), Esophageal varices (HCC), Fatty liver, GERD (gastroesophageal reflux disease), GI bleed, Heart murmur, Hep C w/o coma, chronic (HCC), Hiatal hernia, History of alcohol abuse, History of blood transfusion, History of colon polyps, History of tobacco abuse, [place] (hard of hearing), Hyperlipidemia, Hypertension, Hyponatremia, Hypotension, Mastoid disorder, bilateral, Pericardial effusion, PONV (postoperative nausea and vomiting), Poor venous access, Port-A-Cath in place, Portal hypertension (HCC), Sciatica, Secondary esophageal varices (HCC), Shortness of breath, Spinal stenosis, Stomach ulcer, Thrombocytopenia (HCC), Tubular adenoma of colon, Under care of team, Under care of team, Vitamin D deficiency, and Wears glasses.    Past Surgical History:   has a past surgical history that includes Bunionectomy; Nasal septum surgery; other surgical history (2016); Colonoscopy; Colonoscopy (10/05/2016); other surgical history  regular lower body clothing?: A Little  How much help is needed for bathing (which includes washing, rinsing, drying)?: A Little  How much help is needed for toileting (which includes using toilet, bedpan, or urinal)?: A Little  How much help is needed for putting on and taking off regular upper body clothing?: A Little  How much help is needed for taking care of personal grooming?: A Little  How much help for eating meals?: A Little  AM-[facility] Inpatient Daily Activity Raw Score: 18  AM-PAC Inpatient ADL T-Scale Score : 38.66  ADL Inpatient CMS 0-100% Score: 46.65  ADL Inpatient CMS G-Code Modifier : CK    Goals  Short Term Goals  Time Frame for Short Term Goals: By discharge  Short Term Goal 1: Pt will complete BADLs with SUP, fair safety, and use of AE/DME/Modified techniques as needed  Short Term Goal 2: Pt will complete functional transfers/mobility with SUP, fair safety and use of least restrictive device  Short Term Goal 3: Pt will actively participate in 15+ minutes of therapeutic exercise/functional activity to promote safety and independence with self-care/mobility  Short Term Goal 4: Pt will tolerate standing for 10+ minutes during self-care/functional activity with SUP to improve balance/activity tolerance  Short Term Goal 5: Pt will demonstrate improved safety awareness with Min cues or less for hand placement/body mechanics during ADLs/functional transfers    Plan  Occupational Therapy Plan  Times Per Week: 4-6  Times Per Day: Once a day  Current Treatment Recommendations: Self-Care / ADL, Strengthening, Balance training, ROM, Functional mobility training, Endurance training, Pain management, Safety education & training, Patient/Caregiver education & training, Equipment evaluation, education, & procurement, Positioning, Home management training    OT Individual Minutes  OT Individual Minutes  Time In: 1433  Time Out: 1449  Minutes: 16  Time Code Minutes   Timed Code Treatment Minutes: 8

## 2024-03-28 VITALS
SYSTOLIC BLOOD PRESSURE: 121 MMHG | WEIGHT: 170 LBS | OXYGEN SATURATION: 100 % | BODY MASS INDEX: 24.34 KG/M2 | HEART RATE: 68 BPM | TEMPERATURE: 98.3 F | DIASTOLIC BLOOD PRESSURE: 74 MMHG | RESPIRATION RATE: 18 BRPM | HEIGHT: 70 IN

## 2024-03-28 LAB
ABSOLUTE BANDS: 0.05 K/UL (ref 0–1)
ALBUMIN SERPL-MCNC: 2.4 G/DL (ref 3.5–5.2)
ALP SERPL-CCNC: 148 U/L (ref 40–129)
ALT SERPL-CCNC: 11 U/L (ref 5–41)
ANION GAP SERPL CALCULATED.3IONS-SCNC: 11 MMOL/L (ref 9–17)
AST SERPL-CCNC: 38 U/L
ATYPICAL LYMPHOCYTE ABSOLUTE COUNT: 0.15 K/UL
ATYPICAL LYMPHOCYTES: 3 %
BANDS: 1 % (ref 0–10)
BASOPHILS # BLD: 0 K/UL (ref 0–0.2)
BASOPHILS NFR BLD: 0 % (ref 0–2)
BILIRUB SERPL-MCNC: 1.6 MG/DL (ref 0.3–1.2)
BUN SERPL-MCNC: 9 MG/DL (ref 8–23)
CALCIUM SERPL-MCNC: 8.2 MG/DL (ref 8.6–10.4)
CHLORIDE SERPL-SCNC: 101 MMOL/L (ref 98–107)
CO2 SERPL-SCNC: 25 MMOL/L (ref 20–31)
CREAT SERPL-MCNC: 0.9 MG/DL (ref 0.7–1.2)
EKG ATRIAL RATE: 77 BPM
EKG P-R INTERVAL: 150 MS
EKG Q-T INTERVAL: 458 MS
EKG QRS DURATION: 84 MS
EKG QTC CALCULATION (BAZETT): 518 MS
EKG R AXIS: 23 DEGREES
EKG T AXIS: -3 DEGREES
EKG VENTRICULAR RATE: 77 BPM
EOSINOPHIL # BLD: 0.05 K/UL (ref 0–0.4)
EOSINOPHILS RELATIVE PERCENT: 1 % (ref 0–4)
ERYTHROCYTE [DISTWIDTH] IN BLOOD BY AUTOMATED COUNT: 15.3 % (ref 11.5–14.9)
GFR SERPL CREATININE-BSD FRML MDRD: >90 ML/MIN/1.73M2
GLUCOSE SERPL-MCNC: 127 MG/DL (ref 70–99)
HCT VFR BLD AUTO: 30.9 % (ref 41–53)
HGB BLD-MCNC: 10.1 G/DL (ref 13.5–17.5)
LYMPHOCYTES NFR BLD: 0.75 K/UL (ref 1–4.8)
LYMPHOCYTES RELATIVE PERCENT: 15 % (ref 24–44)
MCH RBC QN AUTO: 32 PG (ref 26–34)
MCHC RBC AUTO-ENTMCNC: 32.9 G/DL (ref 31–37)
MCV RBC AUTO: 97.4 FL (ref 80–100)
MONOCYTES NFR BLD: 0.9 K/UL (ref 0.1–1.3)
MONOCYTES NFR BLD: 18 % (ref 1–7)
MORPHOLOGY: ABNORMAL
NEUTROPHILS NFR BLD: 62 % (ref 36–66)
NEUTS SEG NFR BLD: 3.1 K/UL (ref 1.3–9.1)
PATH REV BLD -IMP: NORMAL
PLATELET # BLD AUTO: 108 K/UL (ref 150–450)
PMV BLD AUTO: 7.9 FL (ref 6–12)
POTASSIUM SERPL-SCNC: 3.8 MMOL/L (ref 3.7–5.3)
POTASSIUM SERPL-SCNC: 3.8 MMOL/L (ref 3.7–5.3)
POTASSIUM SERPL-SCNC: 4 MMOL/L (ref 3.7–5.3)
PROT SERPL-MCNC: 5.2 G/DL (ref 6.4–8.3)
RBC # BLD AUTO: 3.17 M/UL (ref 4.5–5.9)
SODIUM SERPL-SCNC: 137 MMOL/L (ref 135–144)
SURGICAL PATHOLOGY REPORT: NORMAL
WBC OTHER # BLD: 5 K/UL (ref 3.5–11)

## 2024-03-28 PROCEDURE — 6370000000 HC RX 637 (ALT 250 FOR IP): Performed by: INTERNAL MEDICINE

## 2024-03-28 PROCEDURE — 99232 SBSQ HOSP IP/OBS MODERATE 35: CPT | Performed by: INTERNAL MEDICINE

## 2024-03-28 PROCEDURE — 80053 COMPREHEN METABOLIC PANEL: CPT

## 2024-03-28 PROCEDURE — 36415 COLL VENOUS BLD VENIPUNCTURE: CPT

## 2024-03-28 PROCEDURE — 97535 SELF CARE MNGMENT TRAINING: CPT

## 2024-03-28 PROCEDURE — 84132 ASSAY OF SERUM POTASSIUM: CPT

## 2024-03-28 PROCEDURE — 99239 HOSP IP/OBS DSCHRG MGMT >30: CPT | Performed by: INTERNAL MEDICINE

## 2024-03-28 PROCEDURE — 2580000003 HC RX 258: Performed by: INTERNAL MEDICINE

## 2024-03-28 PROCEDURE — 93010 ELECTROCARDIOGRAM REPORT: CPT | Performed by: INTERNAL MEDICINE

## 2024-03-28 PROCEDURE — 99222 1ST HOSP IP/OBS MODERATE 55: CPT | Performed by: INTERNAL MEDICINE

## 2024-03-28 PROCEDURE — 85025 COMPLETE CBC W/AUTO DIFF WBC: CPT

## 2024-03-28 RX ORDER — NADOLOL 20 MG/1
10 TABLET ORAL DAILY
Status: DISCONTINUED | OUTPATIENT
Start: 2024-03-28 | End: 2024-03-28 | Stop reason: HOSPADM

## 2024-03-28 RX ORDER — POTASSIUM CHLORIDE 20MEQ/15ML
40 LIQUID (ML) ORAL DAILY
Qty: 900 ML | Refills: 0 | Status: SHIPPED | OUTPATIENT
Start: 2024-03-28

## 2024-03-28 RX ORDER — M-VIT,TX,IRON,MINS/CALC/FOLIC 27MG-0.4MG
1 TABLET ORAL DAILY
Status: DISCONTINUED | OUTPATIENT
Start: 2024-03-28 | End: 2024-03-28 | Stop reason: HOSPADM

## 2024-03-28 RX ORDER — SUCRALFATE 1 G/1
1 TABLET ORAL EVERY 8 HOURS SCHEDULED
Status: DISCONTINUED | OUTPATIENT
Start: 2024-03-28 | End: 2024-03-28 | Stop reason: HOSPADM

## 2024-03-28 RX ORDER — FERROUS SULFATE 325(65) MG
325 TABLET ORAL 2 TIMES DAILY
Status: DISCONTINUED | OUTPATIENT
Start: 2024-03-28 | End: 2024-03-28 | Stop reason: HOSPADM

## 2024-03-28 RX ORDER — MAGNESIUM 200 MG
200 TABLET ORAL DAILY
Qty: 30 TABLET | Refills: 0 | Status: SHIPPED | OUTPATIENT
Start: 2024-03-28

## 2024-03-28 RX ORDER — LANSOPRAZOLE 30 MG/1
30 TABLET, ORALLY DISINTEGRATING, DELAYED RELEASE ORAL
Status: DISCONTINUED | OUTPATIENT
Start: 2024-03-29 | End: 2024-03-28 | Stop reason: HOSPADM

## 2024-03-28 RX ORDER — LACTULOSE 10 G/15ML
30 SOLUTION ORAL 3 TIMES DAILY
Status: DISCONTINUED | OUTPATIENT
Start: 2024-03-28 | End: 2024-03-28 | Stop reason: HOSPADM

## 2024-03-28 RX ADMIN — LACTULOSE 30 G: 20 SOLUTION ORAL at 08:53

## 2024-03-28 RX ADMIN — FERROUS SULFATE TAB 325 MG (65 MG ELEMENTAL FE) 325 MG: 325 (65 FE) TAB at 08:53

## 2024-03-28 RX ADMIN — LANSOPRAZOLE 30 MG: 30 TABLET, ORALLY DISINTEGRATING ORAL at 05:25

## 2024-03-28 RX ADMIN — SODIUM CHLORIDE, PRESERVATIVE FREE 10 ML: 5 INJECTION INTRAVENOUS at 09:01

## 2024-03-28 RX ADMIN — POTASSIUM CHLORIDE 40 MEQ: 1500 TABLET, EXTENDED RELEASE ORAL at 12:53

## 2024-03-28 RX ADMIN — ONDANSETRON 4 MG: 4 TABLET, ORALLY DISINTEGRATING ORAL at 15:48

## 2024-03-28 RX ADMIN — SUCRALFATE 1 G: 1 TABLET ORAL at 12:53

## 2024-03-28 RX ADMIN — SUCRALFATE 1 G: 1 TABLET ORAL at 05:25

## 2024-03-28 RX ADMIN — ONDANSETRON 4 MG: 4 TABLET, ORALLY DISINTEGRATING ORAL at 09:08

## 2024-03-28 RX ADMIN — NADOLOL 10 MG: 20 TABLET ORAL at 08:54

## 2024-03-28 RX ADMIN — LACTULOSE 30 G: 20 SOLUTION ORAL at 12:53

## 2024-03-28 RX ADMIN — Medication 1 TABLET: at 08:50

## 2024-03-28 RX ADMIN — POTASSIUM CHLORIDE 40 MEQ: 1500 TABLET, EXTENDED RELEASE ORAL at 08:52

## 2024-03-28 NOTE — PROGRESS NOTES
03/28/24 1136   Encounter Summary   Encounter Overview/Reason  Rituals, Rites and Sacraments   Service Provided For: Patient   Last Encounter  03/28/24   Complexity of Encounter Low   Spiritual/Emotional needs   Type Spiritual Support   Rituals, Rites and Sacraments   Type Sacrament of Sick

## 2024-03-28 NOTE — PROGRESS NOTES
No [id number] phone call with pt via room phone he stated he has  2 bottles of Potassium solution and OTC Magnesium both at home already. MaggyServe Nurse Feliberto so she is aware no need to wait for [id number] 3/28/24 11:07am terra

## 2024-03-28 NOTE — PROGRESS NOTES
[facility]   INPATIENT OCCUPATIONAL THERAPY  PROGRESS NOTE  Date: 3/28/2024  Patient Name: Frank Lisa       Room:   MRN: [id number]    : 1960  (63 y.o.)  Gender: male   Referring Practitioner: Kathi Russo MD  Diagnosis: Hypokalemia      Discharge Recommendations:  The patient may need non-skilled ADL assistance after discharge.     OT Equipment Recommendations  Other: tub seat    Restrictions/Precautions       O2 Device: None (Room air)    Subjective  Subjective  Subjective: \"I need to get this off first.\"  pt notes wanting electrodes off before donning his jeans with suspenders. Aide in and let pt know he can remove them.        Objective  Orientation  Overall Orientation Status: Within Functional Limits  Cognition  Arousal/Alertness: Appropriate responses to stimuli  Following Commands: Follows one step commands consistently  Attention Span: Difficulty dividing attention  Memory: Appears intact  Problem Solving: Assistance required to implement solutions;Assistance required to generate solutions  Insights: Decreased awareness of deficits  Initiation: Does not require cues  Sequencing: Does not require cues  Cognition Comment: slight decreased problem solving noted:  pt picking up his jeans, pt unaware a coin fell to the ground, when OT points it out, pt wonders where that came from.  (fell out of his pocket)    Activities of Daily Living  ADL  Feeding: Independent  Grooming: Independent  UE Bathing: Setup  LE Bathing: Supervision  UE Dressing: Independent  LE Dressing: Independent  LE Dressing Skilled Clinical Factors: pt obtained his jeans, donned as well as socks and shoes.  Toileting: Independent  Functional Mobility: Independent  Functional Mobility Skilled Clinical Factors: pt amb without device around bed to obtain jeans off of chair, returns to sit EOB to don.  10 feet x 2.  tobias 5 min stand, amb with good balance and safety.  able to  coin from the  Tolerance  Activity Tolerance: Patient Tolerated treatment well  Assessment  Performance deficits / Impairments: Decreased functional mobility , Decreased ADL status, Decreased ROM, Decreased strength, Decreased safe awareness, Decreased cognition, Decreased endurance, Decreased balance, Decreased high-level IADLs, Decreased fine motor control, Decreased coordination  Treatment Diagnosis: Impaired self-care status  Prognosis: Good  Decision Making: Medium Complexity  Discharge Recommendations: Patient would benefit from continued therapy after discharge  OT Equipment Recommendations  Other: TBD  Safety Devices  Type of Devices:  (left with STNA)    AM-[facility] Daily Activities Inpatient  AM-PAC Daily Activity - Inpatient   How much help is needed for putting on and taking off regular lower body clothing?: None  How much help is needed for bathing (which includes washing, rinsing, drying)?: A Little  How much help is needed for toileting (which includes using toilet, bedpan, or urinal)?: None  How much help is needed for putting on and taking off regular upper body clothing?: None  How much help is needed for taking care of personal grooming?: None  How much help for eating meals?: None  AM-[facility] Inpatient Daily Activity Raw Score: 23  AM-PAC Inpatient ADL T-Scale Score : 51.12  ADL Inpatient CMS 0-100% Score: 15.86  ADL Inpatient CMS G-Code Modifier : CI    OT Minutes  OT Individual Minutes  Time In: 1454  Time Out: 1509  Minutes: 15      Electronically signed by Jannette De Leon OT on 3/28/24 at 3:22 PM EDT

## 2024-03-28 NOTE — PROGRESS NOTES
Discharge instructions reviewed with patient. Patient verbalized understanding and had no further questions. Patient wheeled down to car.

## 2024-03-28 NOTE — CARE COORDINATION
ENDOSCOPY N/A 02/02/2021    EGD BIOPSY and spot marking performed by Augusto Cordova MD at [facility] ENDO    UPPER GASTROINTESTINAL ENDOSCOPY N/A 02/12/2021    ENDOSCOPIC ULTRASOUND, EGD performed by Yo Santos MD at [facility] Endoscopy    UPPER GASTROINTESTINAL ENDOSCOPY  02/12/2021    EGD DIAGNOSTIC ONLY performed by Yo Santos MD at [facility] Endoscopy    UPPER GASTROINTESTINAL ENDOSCOPY N/A 08/31/2021    EGD BIOPSY performed by Augusto Cordova MD at [facility] ENDO    UPPER GASTROINTESTINAL ENDOSCOPY N/A 01/21/2022    EGD BIOPSY performed by Augusto Cordova MD at [facility]    UPPER GASTROINTESTINAL ENDOSCOPY N/A 04/15/2022    EGD ESOPHAGOGASTRODUODENOSCOPY performed by Lucian Harley MD at [facility] OR    UPPER GASTROINTESTINAL ENDOSCOPY N/A 06/06/2022    EGD BAND LIGATION performed by Bi Dawkins MD at [facility]    UPPER GASTROINTESTINAL ENDOSCOPY N/A 06/09/2022    EGD ESOPHAGOGASTRODUODENOSCOPY performed by Bi Dawkins MD at [facility]    UPPER GASTROINTESTINAL ENDOSCOPY N/A 09/14/2022    EGD ESOPHAGOGASTRODUODENOSCOPY ENDOSCOPIC APC AT GE JUNCTION performed by Jose Mckenzie MD at [facility]    UPPER GASTROINTESTINAL ENDOSCOPY N/A 10/19/2022    EGD BIOPSY performed by Augusto Cordova MD at [facility]    UPPER GASTROINTESTINAL ENDOSCOPY N/A 10/31/2022    EGD with APC performed by Augusto Cordova MD at [facility]    UPPER GASTROINTESTINAL ENDOSCOPY  12/29/2022    EGD ESOPHAGOGASTRODUODENOSCOPY performed by Yo Santos MD at [facility] OR    UPPER GASTROINTESTINAL ENDOSCOPY N/A 01/03/2023    EGD ESOPHAGOGASTRODUODENOSCOPY performed by Arsen Russo MD at [facility] OR    UPPER GASTROINTESTINAL ENDOSCOPY N/A 01/26/2023    EGD CONTROL HEMORRHAGE performed by Augusto Cordova MD at [facility]    UPPER GASTROINTESTINAL ENDOSCOPY N/A 02/13/2023    EGD WITH APC GOLD PROBE performed by Augusto Cordova MD at [facility]    UPPER GASTROINTESTINAL ENDOSCOPY N/A 03/17/2023    EGD WITH RESOLUTION CLIP  Status:  oriented and alert    IV Access:  - None    Nursing Mobility/ADLs:  Walking   Independent  Transfer  Independent  Bathing  Independent  Dressing  Independent  Toileting  Independent  Feeding  Independent  Med Admin  Independent  Med Delivery   none    Wound Care Documentation and Therapy:  Incision 10/10/23 Abdomen Lower;Medial (Active)   Number of days: 169        Elimination:  Continence:   Bowel: Yes  Bladder: Yes  Urinary Catheter: None   Colostomy/Ileostomy/Ileal Conduit: No       Date of Last BM: 3/28/24    Intake/Output Summary (Last 24 hours) at 3/28/2024 0921  Last data filed at 3/28/2024 0901  Gross per 24 hour   Intake 6 ml   Output 750 ml   Net -744 ml     I/O last 3 completed shifts:  In: -   Out: 1850 [Urine:1850]    Safety Concerns:     At Risk for Falls and Aspiration Risk    Impairments/Disabilities:      None    Nutrition Therapy:  Current Nutrition Therapy:   - Oral Diet:  General    Routes of Feeding: Oral  Liquids: Thin Liquids  Daily Fluid Restriction: no  Last Modified Barium Swallow with Video (Video Swallowing Test): not done    Treatments at the Time of Hospital Discharge:   Respiratory Treatments: none  Oxygen Therapy:  is not on home oxygen therapy.  Ventilator:    - No ventilator support    Rehab Therapies: Physical Therapy and Occupational Therapy  Weight Bearing Status/Restrictions: No weight bearing restrictions  Other Medical Equipment (for information only, NOT a DME order):    Other Treatments: Skilled Nursing assessment and monitoring. Medication education and monitoring per protocol.      Patient's personal belongings (please select all that are sent with patient):  Nata    RN SIGNATURE:  Electronically signed by Feliberto Hedrick RN on 3/28/24 at 9:36 AM EDT    CASE MANAGEMENT/SOCIAL WORK SECTION    Inpatient Status Date:     Readmission Risk Assessment Score:  Readmission Risk              Risk of Unplanned Readmission:  40           Discharging to Facility/ Agency

## 2024-03-28 NOTE — PROGRESS NOTES
[facility]   IN-PATIENT SERVICE   [facility]    HISTORY AND PHYSICAL EXAMINATION            Date:   3/28/2024  Patient name:  Frank Lisa  Date of admission:  3/26/2024  4:01 PM  MRN:   [id number]  Account:  [id number]  YOB: 1960  PCP:    Lopez Rosario PA  Room:   [address] [place] [address]  Code Status:    Full Code    Chief Complaint:     Chief Complaint   Patient presents with    Fatigue    Dizziness    Abnormal Lab       History Obtained From:     patient    History of Present Illness:     The patient is a 63 y.o.  Non- / non  male who presents with Fatigue, Dizziness, and Abnormal Lab   and he is admitted to the hospital for the management of with multiple comorbidities including history of liver cirrhosis secondary to alcohol use and hepatitis C, history of TIPS, hepatitis C treated esophageal cancer, history of dysphagia s/p PEG follows up with oncology currently in remission , recently had EGD on 1/9/20/2024 did not show any evidence of recurrence,  Patient came today with abdominal labs  Patient had lab work ordered by GI and found to have severe hypomagnesemia and hypokalemia and was recommended to come to ER.  Patient states that he had previous electrolyte abnormalities as well and was feeling very weak and fatigued  .  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 Mc, orthopedic, every  wheezing.  CARDIOVASCULAR:  negative for chest pain, palpitations.  GASTROINTESTINAL:  negative for nausea, vomiting, diarrhea, constipation, change in bowel habits, abdominal pain   GENITOURINARY:  negative for difficulty of urination, burning with urination, frequency   INTEGUMENT:  negative for rash, skin lesions, easy bruising   HEMATOLOGIC/LYMPHATIC:  negative for swelling/edema   ALLERGIC/IMMUNOLOGIC:  negative for urticaria , itching  ENDOCRINE:  negative increase in drinking, increase in urination, hot or cold intolerance  MUSCULOSKELETAL:  negative joint pains, muscle aches, swelling of joints  NEUROLOGICAL:  negative for headaches, dizziness, lightheadedness, numbness, pain, tingling extremities  BEHAVIOR/PSYCH:  negative for depression, anxiety    Physical Exam:   /62   Pulse 69   Temp 98.2 °F (36.8 °C) (Oral)   Resp 18   Ht 1.778 m (5' 10\")   Wt 77.1 kg (170 lb)   SpO2 100%   BMI 24.39 kg/m²   Temp (24hrs), Av °F (36.7 °C), Min:97.6 °F (36.4 °C), Max:98.2 °F (36.8 °C)    No results for input(s): \"POCGLU\" in the last 72 hours.    Intake/Output Summary (Last 24 hours) at 3/28/2024 0919  Last data filed at 3/28/2024 0901  Gross per 24 hour   Intake 6 ml   Output 750 ml   Net -744 ml       General Appearance:  alert, well appearing, and in no acute distress  Mental status: oriented to person, place, and time with normal affect  Head:  normocephalic, atraumatic.  Eye: no icterus, redness, pupils equal and reactive, extraocular eye movements intact, conjunctiva clear  Ear: normal external ear, no discharge, hearing intact  Nose:  no drainage noted  Mouth: mucous membranes moist  Neck: supple, no carotid bruits, thyroid not palpable  Lungs: Bilateral equal air entry, clear to ausculation, no wheezing, rales or rhonchi, normal effort  Cardiovascular: normal rate, regular rhythm, no murmur, gallop, rub.  Abdomen: Soft, nontender, nondistended, normal bowel sounds, no hepatomegaly or splenomegaly

## 2024-03-28 NOTE — CARE COORDINATION
ONGOING DISCHARGE PLAN:    Patient is alert and oriented x4.    Spoke with patient regarding discharge plan and patient confirms that plan is still to return to home w/ VNS, [facility] [place].     Pt. Wants Shower Chair. DME order & Face Sheet faxed to [facility]. Spoke to Marcella. Writer informed pt. To call when he gets home for Delivery. States, understanding.     K+ 3.8.     PT/OT on board. Will follow for any rec/needs.     New GI consult.     Denies needs.     Will continue to follow for additional discharge needs.    If patient is discharged prior to next notation, then this note serves as note for discharge by case management.    Electronically signed by Diana Carrillo RN on 3/28/2024 at 10:45 AM

## 2024-03-28 NOTE — CONSULTS
GI Consult Note:    Name: Frank Lisa  MRN: [id number]     Acct: [id number]  Room: 2118/2118-01    Admit Date: 3/26/2024  PCP: Lopez Rosario PA    Physician Requesting Consult: Kathi Russo MD     Reason for Consult:      Cirrhosis of liver  History of EtOH abuse  Fatigue  Abnormal labs  PEG tube status  Elevated CEA    Chief Complaint:     Chief Complaint   Patient presents with    Fatigue    Dizziness    Abnormal Lab       History Obtained From:     Patient and EMR    History of Present Illness:      Frank Lisa is a  63 y.o.  male who presents with Fatigue, Dizziness, and Abnormal Lab    This 63 gentleman history significant for cirrhosis of the liver past history for alcohol abuse history for ascites history for TIPS procedure history for hepatitis C  He has history for dysphagia had a PEG tube placed in the past  Has been seen and followed by hematology oncology  He has history for esophageal cancer  Recently been found to have elevated CEA level  Oncology is evaluating the patient and consulting GI for possible colonoscopy  He is already scheduled for PEG tube removal next week  He denies rectal bleeding melanotic stools  Is any significant abdominal pain bloating gas cramping  Denies any dysphagia at this time tolerating regular diet  His past medical history laboratory workup charts were all reviewed  Has elevated LFTs including alkaline phosphatase low albumin hemoglobin is 10.1 without any overt bleeding CEA level is high  Symptoms:  Onset:  Location:  abdomen  Duration:  day(s)  Severity:  mild, moderate  Quality:  intermittent      Past Medical History:     Past Medical History:   Diagnosis Date    Abnormal EKG     Acute deep vein thrombosis (DVT) of brachial vein of right upper extremity (HCC) 01/07/2023    Also- Superficial venous thrombosis of the cephalic vein in the forearm    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)     Alcoholic cirrhosis of liver with ascites (HCC)     Anemia  05:16 AM    RBC 3.17 03/28/2024 05:16 AM    RBC 4.75 04/19/2012 11:30 AM    HGB 10.1 03/28/2024 05:16 AM    HCT 30.9 03/28/2024 05:16 AM    MCV 97.4 03/28/2024 05:16 AM    MCH 32.0 03/28/2024 05:16 AM    MCHC 32.9 03/28/2024 05:16 AM    RDW 15.3 03/28/2024 05:16 AM     03/28/2024 05:16 AM     04/19/2012 11:30 AM    MPV 7.9 03/28/2024 05:16 AM     CBC with Differential:    Lab Results   Component Value Date/Time    WBC 5.0 03/28/2024 05:16 AM    RBC 3.17 03/28/2024 05:16 AM    RBC 4.75 04/19/2012 11:30 AM    HGB 10.1 03/28/2024 05:16 AM    HCT 30.9 03/28/2024 05:16 AM     03/28/2024 05:16 AM     04/19/2012 11:30 AM    MCV 97.4 03/28/2024 05:16 AM    MCH 32.0 03/28/2024 05:16 AM    MCHC 32.9 03/28/2024 05:16 AM    RDW 15.3 03/28/2024 05:16 AM    NRBC 1 06/03/2022 06:20 AM    METASPCT 1 03/25/2023 09:18 PM    LYMPHOPCT 15 03/28/2024 05:16 AM    MONOPCT 18 03/28/2024 05:16 AM    MYELOPCT 1 06/01/2021 08:25 AM    BASOPCT 0 03/28/2024 05:16 AM    MONOSABS 0.90 03/28/2024 05:16 AM    LYMPHSABS 0.75 03/28/2024 05:16 AM    EOSABS 0.05 03/28/2024 05:16 AM    BASOSABS 0.00 03/28/2024 05:16 AM    DIFFTYPE PENDING 10/17/2023 07:46 AM     Hemoglobin/Hematocrit:    Lab Results   Component Value Date/Time    HGB 10.1 03/28/2024 05:16 AM    HCT 30.9 03/28/2024 05:16 AM     CMP:    Lab Results   Component Value Date/Time     03/28/2024 05:16 AM    K 4.0 03/28/2024 11:21 AM     03/28/2024 05:16 AM    CO2 25 03/28/2024 05:16 AM    BUN 9 03/28/2024 05:16 AM    CREATININE 0.9 03/28/2024 05:16 AM    GFRAA >60 09/28/2022 01:33 PM    AGRATIO 0.8 01/29/2024 08:45 AM    LABGLOM >90 03/28/2024 05:16 AM    GLUCOSE 127 03/28/2024 05:16 AM    GLUCOSE 71 04/21/2023 04:18 AM    PROT 5.2 03/28/2024 05:16 AM    LABALBU 2.4 03/28/2024 05:16 AM    LABALBU 4.7 04/19/2012 11:30 AM    CALCIUM 8.2 03/28/2024 05:16 AM    BILITOT 1.6 03/28/2024 05:16 AM    ALKPHOS 148 03/28/2024 05:16 AM    AST 38 03/28/2024 05:16 AM

## 2024-03-28 NOTE — DISCHARGE INSTR - COC
Continuity of Care Form    Patient Name: Frank Lisa   :  1960  MRN:  [id number]    Admit date:  3/26/2024  Discharge date:  3/28/24    Code Status Order: Full Code   Advance Directives:     Admitting Physician:  Kathi Russo MD  PCP: Lopez Rosario PA    Discharging Nurse:   Discharging Hospital Unit/Room#: 2118/2118-01  Discharging Unit Phone Number:     Emergency Contact:   Extended Emergency Contact Information  Primary Emergency Contact: Nilam Lisa  Home Phone: [phone]  Work Phone: [phone]  Mobile Phone: [phone]  Relation: Ex-Spouse    Past Surgical History:  Past Surgical History:   Procedure Laterality Date    BUNIONECTOMY      twice on right side    BUNIONECTOMY Left     CARPAL TUNNEL RELEASE Right     COLON SURGERY  10/10/2023    EXPLORATORY LAPAROTOMY, REVERSAL ILEOSTOMY WITH ILEOCOLOSTOMY, LYSIS OF ADHESIONS,    COLONOSCOPY      at age 39    COLONOSCOPY  10/05/2016    polyps-pathology tubular adenoma, and abnormal looking mucosa right colon-pathology-tubular adenoma    COLONOSCOPY N/A 2018    COLONOSCOPY POLYPECTOMY COLD BIOPSY performed by Augusto Cordova MD at [facility] OR    COLONOSCOPY  2018    Small polyp in the sigmoid colon and excised with biopsy forceps--tubular adenoma    COLONOSCOPY N/A 2022    COLONOSCOPY POLYPECTOMY performed by Augusto Cordova MD at [facility] OR    ENDOSCOPY, COLON, DIAGNOSTIC      EGD    ESOPHAGOGASTRODUODENOSCOPY  2022    ESOPHAGOGASTRODUODENOSCOPY N/A 2023    IR PORT PLACEMENT EQUAL OR GREATER THAN 5 YEARS  2021    IR PORT PLACEMENT EQUAL OR GREATER THAN 5 YEARS 2021 [facility] SPECIAL PROCEDURES    IR TIPS INSERTION  2022    IR TIPS INSERTION 2022 Scar VELEZ MD [facility] SPECIAL PROCEDURES    KNEE SURGERY Left     cyst removed    LAPAROTOMY N/A 2023    LAPAROTOMY EXPLORATORY, RIGHT COLECTOMY, CREATION IF END ILEOSTOMY performed by Kvng Waddell DO at [facility] OR    NASAL SEPTUM

## 2024-03-28 NOTE — CARE COORDINATION
[facility] Encounter Date/Time: 3/26/2024 1601    Hospital Account: [id number]    MRN: [id number]    Patient: Alhaji Lisa    Contact Serial #: [id number]      ENCOUNTER          Patient Class: I Private Enc?  No Unit RM BD: MARA PROG    Hospital Service: MED   Encounter DX: Hypokalemia [E87.6]   ADM Provider: Kathi Russo MD   Procedure:     ATT Provider: Kathi Russo MD   REF Provider:        Admission DX: Hypokalemia, Hypomagnesemia, Generalized weakness and DX codes: E87.6, E83.42, R53.1      PATIENT                 Name: Alhaji Lisa : 1960 (63 yrs)   Address: [facility] [address] Sex: Male   City: [place] [address]         Marital Status: Single   Employer: RETIRED         [employer]: [employer]   Primary Care Provider: Lopez Rosario PA         Primary Phone: [phone]   EMERGENCY CONTACT   Contact Name Legal Guardian? Relationship to Patient Home Phone Work Phone   1. Nilam Lisa  2. *No Contact Specified*      Ex-Spouse    [phone]            GUARANTOR            Guarantor: Alhaji HSU Maria L     : 1960   Address: [facility] [address]  Sex: [place] [address]     Relation to Patient: Self       Home Phone: [phone]   Guarantor ID: [id number]       Work Phone:     Guarantor Employer: RETIRED         Status: RETIRED      COVERAGE        PRIMARY INSURANCE   Payor: [facility] DUAL* Plan: ALLWELL FROM [facility]*   Payor Address: [place] [address]       Group Number: [id number] Insurance Type: INDEMNITY   Subscriber Name: ALHAJI LISA Subscriber : 1960   Subscriber ID: [id number] Pat. Rel. to Sub: Self   SECONDARY INSURANCE   Payor:   Plan:     Payor Address:  ,           Group Number:   Insurance Type:     Subscriber Name:   Subscriber :     Subscriber ID:   Pat. Rel. to Sub:          CSN: [id number]        [id number]        CSN                                    Req/Control # [Problem

## 2024-03-29 ENCOUNTER — TELEPHONE (OUTPATIENT)
Dept: NEUROLOGY | Age: 64
End: 2024-03-29

## 2024-03-29 ENCOUNTER — CARE COORDINATION (OUTPATIENT)
Dept: CASE MANAGEMENT | Age: 64
End: 2024-03-29

## 2024-03-29 NOTE — PROGRESS NOTES
Today's Date: 3/28/2024  Patient Name: Frank Lisa  Date of admission: 3/26/2024  4:01 PM  Patient's age: 63 y.o., 1960  Admission Dx: Hypokalemia [E87.6]  Hypomagnesemia [E83.42]  Generalized weakness [R53.1]    Reason for Consult: High CEA/esophageal cancer  Requesting Physician: Kathi Russo MD    CHIEF COMPLAINT: Fatigue dizziness    Interval history  No bleeding  Discussed labs workup    History Obtained From:  patient    HISTORY OF PRESENT ILLNESS:      The patient is a 63 y.o.  male who is admitted to the hospital for dizziness fatigue patient does have history of liver cirrhosis secondary to alcohol use and hepatitis C history of TIPS, history of esophageal cancer he does have history also of dysphagia status post PEG tube most recent EGD was done on January 9, 2024 no evidence of cancer recurrence patient was admitted for hypomagnesemia and hypokalemia and oncology consulted for rising CEA, no recent colonoscopy patient follow-up with oncology group Dr. Galicia  Platelet down to 105    Oncology history  Esophageal cancer [id number]  Stage II   started on concurrent chemoradiation preoperatively on 5/4/21  Chemoradiation completed 6/9/21  Patient had a PET scan on 7/23/2021 which showed no evidence of recurrence  Patient has been evaluated by thoracic surgeon at [facility] Dr. Velásquez and also hepatologist Dr. Sheffield  At [place] his case was discussed at thoracic tumor oncology board with recommendations NOT to do any surgery because of his liver failure.  So surveillance recommended  He had an upper endoscopy on 8/31/21 which showed no residual cancer but showed Lezama's esophagus with high-grade dysplasia.   Another endoscopy on 1/21/2022 was negative for recurrence  His CEA level is rising therefore PET scan was done on 10/26/2023 showed mild metabolic activity in the distal esophagus and no obvious recurrence noted  Patient had a EGD on 1/9/2024 and the biopsy showed no  patient      Discussed with patient and Nurse.      Thank you for asking us to see this patient.                                    Joseluis Erickson MD                          [facility]                            This note is created with the assistance of a speech recognition program.  While intending to generate a document that actually reflects the content of the visit, the document can still have some errors including those of syntax and sound a like substitutions which may escape proof reading.  It such instances, actual meaning can be extrapolated by contextual diversion.     Hematologist/Medical Oncologist

## 2024-03-29 NOTE — TELEPHONE ENCOUNTER
03 29 2024 I called the patient times 2 (03 22 2024 LMOM and 03 29 2024 the patient is not accepting calls at the time that I called at )  to schedule new patient appointment with one of our providers, no response.  I mailed the patient a letter asking them to call the office back to schedule this appointment.  KS

## 2024-03-29 NOTE — CARE COORDINATION
Care Transitions Initial Follow Up Call Attempt #1 -Unable to leave VM for patient - message was \"not receiving calls now\" - no VM option.    Communicated with Go re: start of care, then left VM message with care coordinator.    Call within 2 business days of discharge: Yes    3/26 - 3/28 [facility] admission for dizziness, fatigue, hypokalemia.  IV replacement - Discharged home on K+Mag supplements.  Go SWARTZ.  Plan is for outpatient colonoscopy + EGD with PEG removal next week.    Routed request fot HFU appt to PCP clinical pool      Patient: Frank Lisa   Patient : 1960   MRN: [id number]    Reason for Admission: dizziness, fatigue, hypokalemia.  Discharge Date: 3/28/24   RARS: Readmission Risk Score: 30.6      Last Discharge Facility       Date Complaint Diagnosis Description Type Department Provider    3/26/24 Fatigue; Dizziness; Abnormal Lab Hypokalemia ... ED to Hosp-Admission (Discharged) (ADMITTED) Kathi Correa MD; Quinn Cage...            Was this an external facility discharge? No     Non-face-to-face services provided:  Obtained and reviewed discharge summary and/or continuity of care documents  Communication with home health agencies or other community services the patient is currently using-message left with care coordinator re: start of care        Follow Up  Future Appointments   Date Time Provider Department Center   2024 11:15 AM Garrison Durán MD [facility]   2024 11:30 AM Ashleigh Sotelo DO [facility]         Grace Gaffney RN

## 2024-03-30 ENCOUNTER — APPOINTMENT (OUTPATIENT)
Dept: CT IMAGING | Age: 64
End: 2024-03-30
Payer: COMMERCIAL

## 2024-03-30 ENCOUNTER — HOSPITAL ENCOUNTER (EMERGENCY)
Age: 64
Discharge: ANOTHER HOSPITAL | End: 2024-03-31
Attending: EMERGENCY MEDICINE
Payer: COMMERCIAL

## 2024-03-30 DIAGNOSIS — K56.609 SBO (SMALL BOWEL OBSTRUCTION) (HCC): Primary | ICD-10-CM

## 2024-03-30 LAB
ALBUMIN SERPL-MCNC: 2.8 G/DL (ref 3.5–5.2)
ALP SERPL-CCNC: 146 U/L (ref 40–129)
ALT SERPL-CCNC: 14 U/L (ref 5–41)
ANION GAP SERPL CALCULATED.3IONS-SCNC: 12 MMOL/L (ref 9–17)
AST SERPL-CCNC: 61 U/L
BASOPHILS # BLD: 0 K/UL (ref 0–0.2)
BASOPHILS NFR BLD: 0 % (ref 0–2)
BILIRUB SERPL-MCNC: 1.1 MG/DL (ref 0.3–1.2)
BUN SERPL-MCNC: 8 MG/DL (ref 8–23)
CALCIUM SERPL-MCNC: 8.4 MG/DL (ref 8.6–10.4)
CHLORIDE SERPL-SCNC: 98 MMOL/L (ref 98–107)
CO2 SERPL-SCNC: 24 MMOL/L (ref 20–31)
CREAT SERPL-MCNC: 0.8 MG/DL (ref 0.7–1.2)
EOSINOPHIL # BLD: 0 K/UL (ref 0–0.4)
EOSINOPHILS RELATIVE PERCENT: 0 % (ref 0–4)
ERYTHROCYTE [DISTWIDTH] IN BLOOD BY AUTOMATED COUNT: 15.5 % (ref 11.5–14.9)
GFR SERPL CREATININE-BSD FRML MDRD: >90 ML/MIN/1.73M2
GLUCOSE SERPL-MCNC: 111 MG/DL (ref 70–99)
HCT VFR BLD AUTO: 32.4 % (ref 41–53)
HGB BLD-MCNC: 10.6 G/DL (ref 13.5–17.5)
LIPASE SERPL-CCNC: 27 U/L (ref 13–60)
LYMPHOCYTES NFR BLD: 0.51 K/UL (ref 1–4.8)
LYMPHOCYTES RELATIVE PERCENT: 9 % (ref 24–44)
MCH RBC QN AUTO: 32.1 PG (ref 26–34)
MCHC RBC AUTO-ENTMCNC: 32.6 G/DL (ref 31–37)
MCV RBC AUTO: 98.2 FL (ref 80–100)
MONOCYTES NFR BLD: 1.14 K/UL (ref 0.1–1.3)
MONOCYTES NFR BLD: 20 % (ref 1–7)
MORPHOLOGY: ABNORMAL
NEUTROPHILS NFR BLD: 71 % (ref 36–66)
NEUTS SEG NFR BLD: 4.05 K/UL (ref 1.3–9.1)
PLATELET # BLD AUTO: 177 K/UL (ref 150–450)
PMV BLD AUTO: 7.2 FL (ref 6–12)
POTASSIUM SERPL-SCNC: 4.7 MMOL/L (ref 3.7–5.3)
PROT SERPL-MCNC: 5.8 G/DL (ref 6.4–8.3)
RBC # BLD AUTO: 3.3 M/UL (ref 4.5–5.9)
SODIUM SERPL-SCNC: 134 MMOL/L (ref 135–144)
WBC OTHER # BLD: 5.7 K/UL (ref 3.5–11)

## 2024-03-30 PROCEDURE — 96374 THER/PROPH/DIAG INJ IV PUSH: CPT

## 2024-03-30 PROCEDURE — 6360000002 HC RX W HCPCS: Performed by: STUDENT IN AN ORGANIZED HEALTH CARE EDUCATION/TRAINING PROGRAM

## 2024-03-30 PROCEDURE — 99285 EMERGENCY DEPT VISIT HI MDM: CPT

## 2024-03-30 PROCEDURE — 2580000003 HC RX 258: Performed by: EMERGENCY MEDICINE

## 2024-03-30 PROCEDURE — 83690 ASSAY OF LIPASE: CPT

## 2024-03-30 PROCEDURE — 80053 COMPREHEN METABOLIC PANEL: CPT

## 2024-03-30 PROCEDURE — 74177 CT ABD & PELVIS W/CONTRAST: CPT

## 2024-03-30 PROCEDURE — 85025 COMPLETE CBC W/AUTO DIFF WBC: CPT

## 2024-03-30 PROCEDURE — 6360000004 HC RX CONTRAST MEDICATION: Performed by: EMERGENCY MEDICINE

## 2024-03-30 PROCEDURE — 96375 TX/PRO/DX INJ NEW DRUG ADDON: CPT

## 2024-03-30 PROCEDURE — 36415 COLL VENOUS BLD VENIPUNCTURE: CPT

## 2024-03-30 RX ORDER — FENTANYL CITRATE 0.05 MG/ML
50 INJECTION, SOLUTION INTRAMUSCULAR; INTRAVENOUS ONCE
Status: COMPLETED | OUTPATIENT
Start: 2024-03-30 | End: 2024-03-31

## 2024-03-30 RX ORDER — ONDANSETRON 2 MG/ML
4 INJECTION INTRAMUSCULAR; INTRAVENOUS ONCE
Status: COMPLETED | OUTPATIENT
Start: 2024-03-30 | End: 2024-03-30

## 2024-03-30 RX ORDER — 0.9 % SODIUM CHLORIDE 0.9 %
100 INTRAVENOUS SOLUTION INTRAVENOUS ONCE
Status: COMPLETED | OUTPATIENT
Start: 2024-03-30 | End: 2024-03-30

## 2024-03-30 RX ORDER — FENTANYL CITRATE 0.05 MG/ML
50 INJECTION, SOLUTION INTRAMUSCULAR; INTRAVENOUS ONCE
Status: COMPLETED | OUTPATIENT
Start: 2024-03-30 | End: 2024-03-30

## 2024-03-30 RX ORDER — SODIUM CHLORIDE 0.9 % (FLUSH) 0.9 %
10 SYRINGE (ML) INJECTION PRN
Status: COMPLETED | OUTPATIENT
Start: 2024-03-30 | End: 2024-03-30

## 2024-03-30 RX ADMIN — SODIUM CHLORIDE, PRESERVATIVE FREE 10 ML: 5 INJECTION INTRAVENOUS at 23:03

## 2024-03-30 RX ADMIN — ONDANSETRON 4 MG: 2 INJECTION INTRAMUSCULAR; INTRAVENOUS at 21:37

## 2024-03-30 RX ADMIN — IOPAMIDOL 75 ML: 755 INJECTION, SOLUTION INTRAVENOUS at 23:03

## 2024-03-30 RX ADMIN — SODIUM CHLORIDE 100 ML: 9 INJECTION, SOLUTION INTRAVENOUS at 23:03

## 2024-03-30 RX ADMIN — FENTANYL CITRATE 50 MCG: 0.05 INJECTION, SOLUTION INTRAMUSCULAR; INTRAVENOUS at 21:26

## 2024-03-30 ASSESSMENT — PAIN DESCRIPTION - DESCRIPTORS: DESCRIPTORS: JABBING

## 2024-03-30 ASSESSMENT — PAIN - FUNCTIONAL ASSESSMENT: PAIN_FUNCTIONAL_ASSESSMENT: 0-10

## 2024-03-30 ASSESSMENT — PAIN DESCRIPTION - ORIENTATION: ORIENTATION: LEFT;RIGHT;LOWER

## 2024-03-30 ASSESSMENT — PAIN SCALES - GENERAL: PAINLEVEL_OUTOF10: 10

## 2024-03-30 ASSESSMENT — PAIN DESCRIPTION - LOCATION: LOCATION: ABDOMEN

## 2024-03-31 ENCOUNTER — APPOINTMENT (OUTPATIENT)
Dept: GENERAL RADIOLOGY | Age: 64
End: 2024-03-31
Payer: COMMERCIAL

## 2024-03-31 ENCOUNTER — HOSPITAL ENCOUNTER (INPATIENT)
Age: 64
LOS: 1 days | Discharge: HOME HEALTH | DRG: 389 | End: 2024-04-01
Attending: EMERGENCY MEDICINE | Admitting: STUDENT IN AN ORGANIZED HEALTH CARE EDUCATION/TRAINING PROGRAM
Payer: COMMERCIAL

## 2024-03-31 ENCOUNTER — APPOINTMENT (OUTPATIENT)
Dept: CT IMAGING | Age: 64
DRG: 389 | End: 2024-03-31
Payer: COMMERCIAL

## 2024-03-31 VITALS
HEIGHT: 70 IN | WEIGHT: 170 LBS | BODY MASS INDEX: 24.34 KG/M2 | OXYGEN SATURATION: 98 % | SYSTOLIC BLOOD PRESSURE: 125 MMHG | DIASTOLIC BLOOD PRESSURE: 66 MMHG | RESPIRATION RATE: 11 BRPM | TEMPERATURE: 98 F | HEART RATE: 76 BPM

## 2024-03-31 DIAGNOSIS — K56.609 SMALL BOWEL OBSTRUCTION (HCC): Primary | ICD-10-CM

## 2024-03-31 LAB
ALBUMIN SERPL-MCNC: 2.5 G/DL (ref 3.5–5.2)
ALBUMIN/GLOB SERPL: 0.9 {RATIO} (ref 1–2.5)
ALP SERPL-CCNC: 122 U/L (ref 40–129)
ALT SERPL-CCNC: 8 U/L (ref 5–41)
ANION GAP SERPL CALCULATED.3IONS-SCNC: 9 MMOL/L (ref 9–17)
AST SERPL-CCNC: 49 U/L
BASOPHILS # BLD: 0.05 K/UL (ref 0–0.2)
BASOPHILS NFR BLD: 1 % (ref 0–2)
BILIRUB SERPL-MCNC: 1.2 MG/DL (ref 0.3–1.2)
BUN SERPL-MCNC: 8 MG/DL (ref 8–23)
CALCIUM SERPL-MCNC: 7.9 MG/DL (ref 8.6–10.4)
CHLORIDE SERPL-SCNC: 104 MMOL/L (ref 98–107)
CO2 SERPL-SCNC: 24 MMOL/L (ref 20–31)
CREAT SERPL-MCNC: 0.9 MG/DL (ref 0.7–1.2)
EOSINOPHIL # BLD: 0.05 K/UL (ref 0–0.44)
EOSINOPHILS RELATIVE PERCENT: 1 % (ref 1–4)
ERYTHROCYTE [DISTWIDTH] IN BLOOD BY AUTOMATED COUNT: 14.7 % (ref 11.8–14.4)
GFR SERPL CREATININE-BSD FRML MDRD: >90 ML/MIN/1.73M2
GLUCOSE SERPL-MCNC: 87 MG/DL (ref 70–99)
HCT VFR BLD AUTO: 28.1 % (ref 40.7–50.3)
HGB BLD-MCNC: 9 G/DL (ref 13–17)
IMM GRANULOCYTES # BLD AUTO: 0.05 K/UL (ref 0–0.3)
IMM GRANULOCYTES NFR BLD: 1 %
INR PPP: 1.4
LACTATE BLDV-SCNC: 2.1 MMOL/L (ref 0.5–2.2)
LACTIC ACID, WHOLE BLOOD: 1.9 MMOL/L (ref 0.7–2.1)
LYMPHOCYTES NFR BLD: 0.64 K/UL (ref 1.1–3.7)
LYMPHOCYTES RELATIVE PERCENT: 12 % (ref 24–43)
MCH RBC QN AUTO: 31.7 PG (ref 25.2–33.5)
MCHC RBC AUTO-ENTMCNC: 32 G/DL (ref 28.4–34.8)
MCV RBC AUTO: 98.9 FL (ref 82.6–102.9)
MONOCYTES NFR BLD: 1.27 K/UL (ref 0.1–1.2)
MONOCYTES NFR BLD: 24 % (ref 3–12)
MORPHOLOGY: ABNORMAL
NEUTROPHILS NFR BLD: 61 % (ref 36–65)
NEUTS SEG NFR BLD: 3.24 K/UL (ref 1.5–8.1)
NRBC BLD-RTO: 0 PER 100 WBC
PLATELET # BLD AUTO: 121 K/UL (ref 138–453)
PMV BLD AUTO: 9.6 FL (ref 8.1–13.5)
POTASSIUM SERPL-SCNC: 4.1 MMOL/L (ref 3.7–5.3)
PROT SERPL-MCNC: 5.2 G/DL (ref 6.4–8.3)
PROTHROMBIN TIME: 17.3 SEC (ref 11.7–14.9)
RBC # BLD AUTO: 2.84 M/UL (ref 4.21–5.77)
SODIUM SERPL-SCNC: 137 MMOL/L (ref 135–144)
WBC OTHER # BLD: 5.3 K/UL (ref 3.5–11.3)

## 2024-03-31 PROCEDURE — 6360000004 HC RX CONTRAST MEDICATION

## 2024-03-31 PROCEDURE — 83605 ASSAY OF LACTIC ACID: CPT

## 2024-03-31 PROCEDURE — 74177 CT ABD & PELVIS W/CONTRAST: CPT

## 2024-03-31 PROCEDURE — 85610 PROTHROMBIN TIME: CPT

## 2024-03-31 PROCEDURE — 99223 1ST HOSP IP/OBS HIGH 75: CPT | Performed by: STUDENT IN AN ORGANIZED HEALTH CARE EDUCATION/TRAINING PROGRAM

## 2024-03-31 PROCEDURE — 99223 1ST HOSP IP/OBS HIGH 75: CPT | Performed by: SURGERY

## 2024-03-31 PROCEDURE — 96374 THER/PROPH/DIAG INJ IV PUSH: CPT

## 2024-03-31 PROCEDURE — 6360000002 HC RX W HCPCS: Performed by: NURSE PRACTITIONER

## 2024-03-31 PROCEDURE — 6360000002 HC RX W HCPCS: Performed by: STUDENT IN AN ORGANIZED HEALTH CARE EDUCATION/TRAINING PROGRAM

## 2024-03-31 PROCEDURE — 1200000000 HC SEMI PRIVATE

## 2024-03-31 PROCEDURE — 2580000003 HC RX 258: Performed by: NURSE PRACTITIONER

## 2024-03-31 PROCEDURE — 2580000003 HC RX 258: Performed by: STUDENT IN AN ORGANIZED HEALTH CARE EDUCATION/TRAINING PROGRAM

## 2024-03-31 PROCEDURE — 80053 COMPREHEN METABOLIC PANEL: CPT

## 2024-03-31 PROCEDURE — C9113 INJ PANTOPRAZOLE SODIUM, VIA: HCPCS | Performed by: STUDENT IN AN ORGANIZED HEALTH CARE EDUCATION/TRAINING PROGRAM

## 2024-03-31 PROCEDURE — 6360000002 HC RX W HCPCS: Performed by: EMERGENCY MEDICINE

## 2024-03-31 PROCEDURE — 96375 TX/PRO/DX INJ NEW DRUG ADDON: CPT

## 2024-03-31 PROCEDURE — 85025 COMPLETE CBC W/AUTO DIFF WBC: CPT

## 2024-03-31 PROCEDURE — 2580000003 HC RX 258

## 2024-03-31 PROCEDURE — 74018 RADEX ABDOMEN 1 VIEW: CPT

## 2024-03-31 PROCEDURE — 36415 COLL VENOUS BLD VENIPUNCTURE: CPT

## 2024-03-31 PROCEDURE — 99285 EMERGENCY DEPT VISIT HI MDM: CPT

## 2024-03-31 RX ORDER — 0.9 % SODIUM CHLORIDE 0.9 %
500 INTRAVENOUS SOLUTION INTRAVENOUS ONCE
Status: COMPLETED | OUTPATIENT
Start: 2024-03-31 | End: 2024-03-31

## 2024-03-31 RX ORDER — POTASSIUM CHLORIDE 20 MEQ/1
40 TABLET, EXTENDED RELEASE ORAL PRN
Status: DISCONTINUED | OUTPATIENT
Start: 2024-03-31 | End: 2024-04-01 | Stop reason: HOSPADM

## 2024-03-31 RX ORDER — SODIUM CHLORIDE 0.9 % (FLUSH) 0.9 %
10 SYRINGE (ML) INJECTION PRN
Status: DISCONTINUED | OUTPATIENT
Start: 2024-03-31 | End: 2024-04-01 | Stop reason: HOSPADM

## 2024-03-31 RX ORDER — ENOXAPARIN SODIUM 100 MG/ML
40 INJECTION SUBCUTANEOUS DAILY
Status: DISCONTINUED | OUTPATIENT
Start: 2024-03-31 | End: 2024-04-01 | Stop reason: HOSPADM

## 2024-03-31 RX ORDER — POLYETHYLENE GLYCOL 3350 17 G/17G
17 POWDER, FOR SOLUTION ORAL DAILY PRN
Status: DISCONTINUED | OUTPATIENT
Start: 2024-03-31 | End: 2024-04-01 | Stop reason: HOSPADM

## 2024-03-31 RX ORDER — SODIUM CHLORIDE 0.9 % (FLUSH) 0.9 %
5-40 SYRINGE (ML) INJECTION EVERY 12 HOURS SCHEDULED
Status: DISCONTINUED | OUTPATIENT
Start: 2024-03-31 | End: 2024-04-01 | Stop reason: HOSPADM

## 2024-03-31 RX ORDER — KETOROLAC TROMETHAMINE 30 MG/ML
30 INJECTION, SOLUTION INTRAMUSCULAR; INTRAVENOUS EVERY 6 HOURS PRN
Status: DISCONTINUED | OUTPATIENT
Start: 2024-03-31 | End: 2024-04-01 | Stop reason: HOSPADM

## 2024-03-31 RX ORDER — POTASSIUM CHLORIDE 7.45 MG/ML
10 INJECTION INTRAVENOUS PRN
Status: DISCONTINUED | OUTPATIENT
Start: 2024-03-31 | End: 2024-04-01 | Stop reason: HOSPADM

## 2024-03-31 RX ORDER — ONDANSETRON 4 MG/1
4 TABLET, ORALLY DISINTEGRATING ORAL EVERY 8 HOURS PRN
Status: DISCONTINUED | OUTPATIENT
Start: 2024-03-31 | End: 2024-04-01 | Stop reason: HOSPADM

## 2024-03-31 RX ORDER — MAGNESIUM SULFATE 1 G/100ML
1000 INJECTION INTRAVENOUS PRN
Status: DISCONTINUED | OUTPATIENT
Start: 2024-03-31 | End: 2024-04-01 | Stop reason: HOSPADM

## 2024-03-31 RX ORDER — MIDAZOLAM HYDROCHLORIDE 1 MG/ML
1 INJECTION INTRAMUSCULAR; INTRAVENOUS ONCE
Status: COMPLETED | OUTPATIENT
Start: 2024-03-31 | End: 2024-03-31

## 2024-03-31 RX ORDER — SODIUM CHLORIDE 9 MG/ML
INJECTION, SOLUTION INTRAVENOUS CONTINUOUS
Status: DISCONTINUED | OUTPATIENT
Start: 2024-03-31 | End: 2024-04-01 | Stop reason: HOSPADM

## 2024-03-31 RX ORDER — ONDANSETRON 2 MG/ML
4 INJECTION INTRAMUSCULAR; INTRAVENOUS EVERY 6 HOURS PRN
Status: DISCONTINUED | OUTPATIENT
Start: 2024-03-31 | End: 2024-04-01 | Stop reason: HOSPADM

## 2024-03-31 RX ORDER — SODIUM CHLORIDE 9 MG/ML
INJECTION, SOLUTION INTRAVENOUS PRN
Status: DISCONTINUED | OUTPATIENT
Start: 2024-03-31 | End: 2024-04-01 | Stop reason: HOSPADM

## 2024-03-31 RX ADMIN — ONDANSETRON 4 MG: 2 INJECTION INTRAMUSCULAR; INTRAVENOUS at 08:39

## 2024-03-31 RX ADMIN — ONDANSETRON 4 MG: 2 INJECTION INTRAMUSCULAR; INTRAVENOUS at 20:19

## 2024-03-31 RX ADMIN — IOPAMIDOL 75 ML: 755 INJECTION, SOLUTION INTRAVENOUS at 10:33

## 2024-03-31 RX ADMIN — IOHEXOL 50 ML: 240 INJECTION, SOLUTION INTRATHECAL; INTRAVASCULAR; INTRAVENOUS; ORAL at 10:33

## 2024-03-31 RX ADMIN — SODIUM CHLORIDE: 9 INJECTION, SOLUTION INTRAVENOUS at 05:12

## 2024-03-31 RX ADMIN — SODIUM CHLORIDE, PRESERVATIVE FREE 40 MG: 5 INJECTION INTRAVENOUS at 09:53

## 2024-03-31 RX ADMIN — FENTANYL CITRATE 50 MCG: 0.05 INJECTION, SOLUTION INTRAMUSCULAR; INTRAVENOUS at 00:13

## 2024-03-31 RX ADMIN — MIDAZOLAM 1 MG: 1 INJECTION INTRAMUSCULAR; INTRAVENOUS at 00:34

## 2024-03-31 RX ADMIN — KETOROLAC TROMETHAMINE 30 MG: 30 INJECTION, SOLUTION INTRAMUSCULAR at 20:19

## 2024-03-31 RX ADMIN — SODIUM CHLORIDE 500 ML: 9 INJECTION, SOLUTION INTRAVENOUS at 00:17

## 2024-03-31 RX ADMIN — SODIUM CHLORIDE 500 ML: 9 INJECTION, SOLUTION INTRAVENOUS at 06:25

## 2024-03-31 RX ADMIN — SODIUM CHLORIDE: 9 INJECTION, SOLUTION INTRAVENOUS at 17:11

## 2024-03-31 RX ADMIN — SODIUM CHLORIDE, PRESERVATIVE FREE 10 ML: 5 INJECTION INTRAVENOUS at 20:21

## 2024-03-31 ASSESSMENT — PAIN SCALES - GENERAL: PAINLEVEL_OUTOF10: 8

## 2024-03-31 ASSESSMENT — PAIN DESCRIPTION - ORIENTATION: ORIENTATION: LOWER

## 2024-03-31 ASSESSMENT — PAIN DESCRIPTION - LOCATION: LOCATION: ABDOMEN

## 2024-03-31 ASSESSMENT — PAIN DESCRIPTION - DESCRIPTORS: DESCRIPTORS: SHARP

## 2024-03-31 NOTE — ED PROVIDER NOTES
[facility]     Emergency Department     Faculty Attestation    I performed a history and physical examination of the patient and discussed management with the resident. I reviewed the resident’s note and agree with the documented findings including all diagnostic interpretations and plan of care. Any areas of disagreement are noted on the chart. I was personally present for the key portions of any procedures. I have documented in the chart those procedures where I was not present during the key portions. I have reviewed the emergency nurses triage note. I agree with the chief complaint, past medical history, past surgical history, allergies, medications, social and family history as documented unless otherwise noted below. Documentation of the HPI, Physical Exam and Medical Decision Making performed by connor is based on my personal performance of the HPI, PE and MDM. For Physician Assistant/ Nurse Practitioner cases/documentation I have personally evaluated this patient and have completed at least one if not all key elements of the E/M (history, physical exam, and MDM). Additional findings are as noted.    Primary Care Physician: Lopez Rosario PA    Note Started: 4:17 AM EDT     VITAL SIGNS:   oral temperature is 98.1 °F (36.7 °C). His blood pressure is 152/73 (abnormal) and his pulse is 91. His respiration is 27 and oxygen saturation is 98%.      Medical Decision Making  Transfer due to SBO.  Extensive abdominal surgical history including recent reversal of ileostomy with anastomosis within the past year.  Imaging at outlying facility showed SBO.  Patient currently has OG in place, no acute distress, cardiac exam regular rate and rhythm no murmurs rubs gallops, pulmonary clear bilaterally abdomen soft somewhat distended no rebound no guarding.  PEG tube in place.  Impression SBO.  Will discontinue OG and transition to drainage via PEG tube.  Surgery

## 2024-03-31 NOTE — ED NOTES
Report given to JERI Santos from [facility].   Report method by phone   The following was reviewed with receiving RN:   Current vital signs:  /66   Pulse 76   Temp 98 °F (36.7 °C)   Resp 11   Ht 1.778 m (5' 10\")   Wt 77.1 kg (170 lb)   SpO2 98%   BMI 24.39 kg/m²                MEWS Score: 1     Any medication or safety alerts were reviewed. Any pending diagnostics and notifications were also reviewed, as well as any safety concerns or issues, abnormal labs, abnormal imaging, and abnormal assessment findings. Questions were answered.

## 2024-03-31 NOTE — CARE COORDINATION
Attempted to see patient to complete initial case management assessment, pt sleeping will attempt to see later as time allows.

## 2024-03-31 NOTE — H&P
(CORGARD) 20 MG tablet 0.5 tablets by Per G Tube route daily 3/18/24   Patricia Montano MD   ondansetron (ZOFRAN-ODT) 4 MG disintegrating tablet dissolve 1 tablet by mouth every 8 hours if needed for nausea and vomiting 3/18/24   Patricia Montano MD   sucralfate (CARAFATE) 1 GM tablet 1 tablet by PEG Tube route every 8 hours for 14 days 3/18/24 4/1/24  Patricia Montano MD        Allergies:     Pollen extract    Social History:     Tobacco:    reports that he quit smoking about 7 years ago. His smoking use included cigarettes. He started smoking about 52 years ago. He has a 45.0 pack-year smoking history. He has never used smokeless tobacco.  Alcohol:      reports current alcohol use.  Drug Use:  Frequency: 1.00 time per week. Drug: Marijuana (Weed).    Family History:     Family History   Problem Relation Age of Onset    Cancer Mother         pancreatic    Cancer Father         bone    Diabetes Sister     Asthma Brother     Allergies Brother         MULTIPLE       Review of Systems:     Positive and Negative as described in HPI.    Review of Systems   Constitutional:  Negative for chills and fever.   HENT:  Negative for congestion, rhinorrhea and sore throat.    Eyes:  Negative for photophobia and pain.   Respiratory:  Negative for cough, shortness of breath and wheezing.    Cardiovascular:  Negative for chest pain and palpitations.   Gastrointestinal:  Positive for abdominal pain and nausea. Negative for vomiting.   Genitourinary:  Negative for frequency and urgency.   Musculoskeletal:  Negative for arthralgias and myalgias.   Neurological:  Negative for dizziness, light-headedness and headaches.     Physical Exam:   BP (!) 125/54   Pulse 73   Temp 98.6 °F (37 °C) (Axillary)   Resp 10   Ht 1.778 m (5' 10\")   Wt 79.5 kg (175 lb 4.3 oz)   SpO2 97%   BMI 25.15 kg/m²   Temp (24hrs), Av.3 °F (36.8 °C), Min:98 °F (36.7 °C), Max:98.6 °F (37 °C)    No results for input(s): \"POCGLU\" in the

## 2024-03-31 NOTE — ED PROVIDER NOTES
[facility] ED  eMERGENCY dEPARTMENT eNCOUnter   Attending Attestation     Pt Name: Frank Lisa  MRN: [id number]  Birthdate 1960  Date of evaluation: 3/30/24       Frank Lisa is a 63 y.o. male who presents with Abdominal Pain and Nausea      History:   63-year-old male presenting to the ER complaining of abdominal pain as well as nausea and vomiting.  Patient does have a history of SBO in the past with bowel resection.    Exam: Vitals:   Vitals:    03/30/24 2159 03/30/24 2309 03/31/24 0015 03/31/24 0130   BP: 129/66 (!) 144/75 124/66 117/68   Pulse: 75 77 84 87   Resp: 15 23 20 12   Temp:       SpO2: 96% 100% 99% 97%   Weight:       Height:         Patient did show evidence of an SBO today on CT imaging.  Consult to surgery was placed and the surgeon at this facility did recommend the patient be transferred to [facility].  Surgery was consulted at [facility] and he did recommend placement of an NG tube.  That will be placed at this facility.  KUB was ordered for confirmation.  It did confirm appropriate positioning.  An accepting ER physician was spoken with who did accept the patient for transfer to [facility].  Surgery is aware the patient is coming.  Patient will be transferred via EMS.  Patient understands and agrees with the plan.    I performed a history and physical examination of the patient and discussed management with the resident. I reviewed the resident’s note and agree with the documented findings and plan of care. Any areas of disagreement are noted on the chart. I was personally present for the key portions of any procedures. I have documented in the chart those procedures where I was not present during the key portions. I have personally reviewed all images and agree with the resident's interpretation. I have reviewed the emergency nurses triage note. I agree with the chief complaint, past medical history, past surgical history, allergies, medications, social and family

## 2024-03-31 NOTE — ED PROVIDER NOTES
[facility] ED  Emergency Department Encounter  Emergency Medicine Resident     Pt Name:Frank Lisa  MRN: [id number]  Birthdate 1960  Date of evaluation: 3/30/24  PCP:  Lopez Rosario PA  Note Started: 11:46 PM EDT      CHIEF COMPLAINT       Chief Complaint   Patient presents with    Abdominal Pain    Nausea       HISTORY OF PRESENT ILLNESS  (Location/Symptom, Timing/Onset, Context/Setting, Quality, Duration, Modifying Factors, Severity.)      Frank Lisa is a 63 y.o. male with alcoholic cirrhosis s/p TIPS procedure, s/p ileostomy reversal with ileocolonic anastomosis (10/2023, Dr. Waddell), hepatitis C s/p treatment, history of esophageal cancer s/p chemo/radiation (completed 6/9/2021), history of aspiration/dysphagia requiring PEG tube placement in the past who presents emergency department with sudden onset periumbilical abdominal pain.  Patient states this pain feels much like his previous obstruction and he is concerned.  Abdomen is soft, diffusely tender to palpation, but without distention, rebound, or guarding.  He states that he has had several bouts of vomiting since the pain started.  Tells me that he has not passing gas since the pain started.  He is unsure of why this occurred.     Of note, regarding patient's esophageal cancer, he had an EGD 8/31/2021 which showed no residual cancer however showed Lezama's esophagus with high-grade dysplasia.  Patient underwent another EGD on 1/21/2022 which was negative for recurrence.  He was noted to have a rising CEA level so PET scan was obtained which was performed on 10/26/2023 which showed mild metabolic activity in the distal esophagus, however no obvious recurrence.    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

## 2024-03-31 NOTE — ED PROVIDER NOTES
[facility] ED  Emergency Department Encounter  Emergency Medicine Resident     Pt Name:Frank Lisa  MRN: [id number]  Birthdate 1960  Date of evaluation: 3/31/24  PCP:  Lopez Rosario PA  Note Started: 3:34 AM EDT      CHIEF COMPLAINT       Chief Complaint   Patient presents with    Abdominal Pain    Nausea       HISTORY OF PRESENT ILLNESS  (Location/Symptom, Timing/Onset, Context/Setting, Quality, Duration, Modifying Factors, Severity.)      Frank Lisa is a 63 y.o. male with history of alcohol cirrhosis status post TIPS procedure status post ileostomy reversal with ileocolonic anastomosis on 1023 with Dr. Fabian, hepatitis C status posttreatment, history of esophageal cancer status post chemoradiation completed in 2021, history of aspiration dysphagia requiring a PEG tube who presents with abdominal pain.  Patient initially was seen at [facility]  Have a small bowel obstruction was transferred here.  Patient states that his pain has improved.  At [facility] they were unable to place an NG tube so they placed an OG tube and there is minimal output.    EGD 8/31/2021 which showed no residual cancer however showed Lezama's esophagus with high-grade dysplasia.  Patient underwent another EGD on 1/21/2022 which was negative for recurrence.  He was noted to have a rising CEA level so PET scan was obtained which was performed on 10/26/2023 which showed mild metabolic activity in the distal esophagus, however no obvious recurrence.     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

## 2024-03-31 NOTE — ED TRIAGE NOTES
Mode of arrival (squad #, walk in, police, etc) : [facility] [place], operated by [facility]        Chief complaint(s): Abdominal pain, nausea        Arrival Note (brief scenario, treatment PTA, etc).: Pt states he developed severe abdominal pain and lower abdominal distention after eating around 1700 today. Pt does have a PEG tube in place. Pt denies chest pain or shortness of breath at this time. Pt A&Ox4.         C= \"Have you ever felt that you should Cut down on your drinking?\"  No  A= \"Have people Annoyed you by criticizing your drinking?\"  No  G= \"Have you ever felt bad or Guilty about your drinking?\"  No  E= \"Have you ever had a drink as an Eye-opener first thing in the morning to steady your nerves or to help a hangover?\"  No      Deferred []      Reason for deferring: N/A    *If yes to two or more: probable alcohol abuse.*   
CONSTITUTIONAL: Elderly female; sitting comfortably with [place] collar; in no apparent distress.   EYES: PERRL; EOM intact.   ENT: normal nose; no rhinorrhea; normal pharynx with no tonsillar hypertrophy.   CARDIOVASCULAR: Normal S1, S2; no murmurs, rubs, or gallops.   RESPIRATORY: Normal chest excursion with respiration; breath sounds clear and equal bilaterally; no wheezes, rhonchi, or rales.  GI/: Normal bowel sounds; non-distended; non-tender; no palpable organomegaly.   MS: No evidence of trauma or deformity to extremities  SKIN: Normal for age and race; warm; dry; good turgor; no apparent lesions or exudate.   NEURO/PSYCH: A & O x 3; grossly unremarkable.  Speaking coherently.  Moving all extremities.  Nonfocal neuro exam

## 2024-03-31 NOTE — ED NOTES
63-year-old male transfer for small bowel obstruction.  Most recent surgery by Dr. Fabian late last year for ileostomy reversal and anastomosis.  Dr. Cooper general surgery aware of patient recommending ED to ED transfer, medicine admission   
Dr. Hermosillo and Dr. Alberto from surgery is at bedside.  
Patient ambulated to the restroom without difficulty  
Surgery team removed the OG.  
The following labs were labeled with appropriate pt sticker and tubed to lab:     [] Blue     [] Lavender   [] on ice  [] Green/yellow  [x] Green/black [x] on ice  [] Grey  [] on ice  [] Yellow  [] Red  [] Pink  [] Type/ Screen  [] ABG  [] VBG    [] COVID-19 swab    [] Rapid  [] PCR  [] Flu swab  [] Peds Viral Panel     [] Urine Sample  [] Fecal Sample  [] Pelvic Cultures  [] Blood Cultures  [] X 2  [] STREP Cultures  [] Wound Cultures   
The following labs were labeled with appropriate pt sticker and tubed to lab:     [x] Blue     [x] Lavender   [] on ice  [x] Green/yellow  [] Green/black [] on ice  [] Grey  [] on ice  [] Yellow  [] Red  [] Pink  [] Type/ Screen  [] ABG  [] VBG    [] COVID-19 swab    [] Rapid  [] PCR  [] Flu swab  [] Peds Viral Panel     [] Urine Sample  [] Fecal Sample  [] Pelvic Cultures  [] Blood Cultures  [] X 2  [] STREP Cultures  [] Wound Cultures    
upper chest    Portal hypertension (HCC)     Sciatica     Secondary esophageal varices (HCC) 06/07/2022    Shortness of breath     Spinal stenosis     Stomach ulcer     hx of    Thrombocytopenia (HCC) 12/23/2020    Tubular adenoma of colon 2016, 2018    Under care of team     PCP- Lopez LEONE    Under care of team     GI- Dr Cordova    Vitamin D deficiency     Wears glasses        Labs:  Labs Reviewed   PROTIME-INR   CBC   COMPREHENSIVE METABOLIC PANEL       Electronically signed by Gayatri Chinchilla RN on 3/31/2024 at 5:00 AM

## 2024-03-31 NOTE — CONSULTS
hypertension (HCC), Sciatica, Secondary esophageal varices (HCC), Shortness of breath, Spinal stenosis, Stomach ulcer, Thrombocytopenia (HCC), Tubular adenoma of colon, Under care of team, Under care of team, Vitamin D deficiency, and Wears glasses.  Past Surgical History   has a past surgical history that includes Bunionectomy; Nasal septum surgery; other surgical history (01/04/2016); Colonoscopy; Colonoscopy (10/05/2016); other surgical history (11/21/2016); other surgical history (12/19/2016); knee surgery (Left); Bunionectomy (Left); Endoscopy, colon, diagnostic; pr neuroplasty &/transpos median nrv carpal tunne (Right, 08/29/2017); Carpal tunnel release (Right); pr neuroplasty &/transpos median nrv carpal tunne (Left, 10/31/2017); Colonoscopy (N/A, 03/30/2018); Colonoscopy (03/30/2018); pr njx aa&/strd tfrml epi lumbar/sacral 1 level (Bilateral, 09/06/2018); other surgical history (09/28/2018); pr njx aa&/strd tfrml epi lumbar/sacral 1 level (N/A, 09/28/2018); Pain management procedure (Left, 07/09/2020); Pain management procedure (Left, 07/20/2020); Pain management procedure (Bilateral, 08/17/2020); other surgical history (Right, 11/23/2020); Nerve Block (Right, 11/23/2020); Pain management procedure (Bilateral, 12/07/2020); Upper gastrointestinal endoscopy (N/A, 12/29/2020); Upper gastrointestinal endoscopy (N/A, 02/02/2021); Upper gastrointestinal endoscopy (N/A, 02/12/2021); Upper gastrointestinal endoscopy (02/12/2021); [place] tooth extraction; IR PORT PLACEMENT > 5 YEARS (04/19/2021); Upper gastrointestinal endoscopy (N/A, 08/31/2021); Upper gastrointestinal endoscopy (N/A, 01/21/2022); Upper gastrointestinal endoscopy (N/A, 04/15/2022); Colonoscopy (N/A, 04/16/2022); Upper gastrointestinal endoscopy (N/A, 06/06/2022); Upper gastrointestinal endoscopy (N/A, 06/09/2022); Paracentesis; Upper gastrointestinal endoscopy (N/A, 09/14/2022); Upper gastrointestinal endoscopy (N/A, 10/19/2022); Upper

## 2024-03-31 NOTE — ED NOTES
Report given to EMT from [facility]  Report method in person   The following was reviewed with receiving RN:   Current vital signs:  /66   Pulse 76   Temp 98 °F (36.7 °C)   Resp 11   Ht 1.778 m (5' 10\")   Wt 77.1 kg (170 lb)   SpO2 98%   BMI 24.39 kg/m²                MEWS Score: 1     Any medication or safety alerts were reviewed. Any pending diagnostics and notifications were also reviewed, as well as any safety concerns or issues, abnormal labs, abnormal imaging, and abnormal assessment findings. Questions were answered.

## 2024-04-01 VITALS
OXYGEN SATURATION: 97 % | BODY MASS INDEX: 25.03 KG/M2 | SYSTOLIC BLOOD PRESSURE: 136 MMHG | WEIGHT: 174.82 LBS | HEART RATE: 59 BPM | HEIGHT: 70 IN | DIASTOLIC BLOOD PRESSURE: 56 MMHG | RESPIRATION RATE: 17 BRPM | TEMPERATURE: 98.1 F

## 2024-04-01 PROBLEM — E44.0 MODERATE MALNUTRITION (HCC): Status: ACTIVE | Noted: 2023-07-01

## 2024-04-01 PROBLEM — K56.609 SBO (SMALL BOWEL OBSTRUCTION) (HCC): Status: ACTIVE | Noted: 2024-04-01

## 2024-04-01 LAB
ALBUMIN SERPL-MCNC: 2.3 G/DL (ref 3.5–5.2)
ALBUMIN/GLOB SERPL: 1 {RATIO} (ref 1–2.5)
ALP SERPL-CCNC: 114 U/L (ref 40–129)
ALT SERPL-CCNC: 6 U/L (ref 10–50)
ANION GAP SERPL CALCULATED.3IONS-SCNC: 9 MMOL/L (ref 9–16)
AST SERPL-CCNC: 44 U/L (ref 10–50)
BILIRUB SERPL-MCNC: 2.9 MG/DL (ref 0–1.2)
BUN SERPL-MCNC: 10 MG/DL (ref 8–23)
CALCIUM SERPL-MCNC: 7.9 MG/DL (ref 8.6–10.4)
CHLORIDE SERPL-SCNC: 109 MMOL/L (ref 98–107)
CO2 SERPL-SCNC: 20 MMOL/L (ref 20–31)
CREAT SERPL-MCNC: 0.8 MG/DL (ref 0.7–1.2)
ERYTHROCYTE [DISTWIDTH] IN BLOOD BY AUTOMATED COUNT: 14.1 % (ref 11.8–14.4)
GFR SERPL CREATININE-BSD FRML MDRD: >90 ML/MIN/1.73M2
GLUCOSE SERPL-MCNC: 78 MG/DL (ref 74–99)
HCT VFR BLD AUTO: 29.7 % (ref 40.7–50.3)
HGB BLD-MCNC: 9.3 G/DL (ref 13–17)
MCH RBC QN AUTO: 32.2 PG (ref 25.2–33.5)
MCHC RBC AUTO-ENTMCNC: 31.3 G/DL (ref 28.4–34.8)
MCV RBC AUTO: 102.8 FL (ref 82.6–102.9)
NRBC BLD-RTO: 0 PER 100 WBC
PLATELET # BLD AUTO: 123 K/UL (ref 138–453)
PMV BLD AUTO: 9.5 FL (ref 8.1–13.5)
POTASSIUM SERPL-SCNC: 4 MMOL/L (ref 3.7–5.3)
PROT SERPL-MCNC: 4.9 G/DL (ref 6.6–8.7)
RBC # BLD AUTO: 2.89 M/UL (ref 4.21–5.77)
SODIUM SERPL-SCNC: 138 MMOL/L (ref 136–145)
WBC OTHER # BLD: 3.6 K/UL (ref 3.5–11.3)

## 2024-04-01 PROCEDURE — 85027 COMPLETE CBC AUTOMATED: CPT

## 2024-04-01 PROCEDURE — A4216 STERILE WATER/SALINE, 10 ML: HCPCS | Performed by: STUDENT IN AN ORGANIZED HEALTH CARE EDUCATION/TRAINING PROGRAM

## 2024-04-01 PROCEDURE — 6360000002 HC RX W HCPCS: Performed by: STUDENT IN AN ORGANIZED HEALTH CARE EDUCATION/TRAINING PROGRAM

## 2024-04-01 PROCEDURE — C9113 INJ PANTOPRAZOLE SODIUM, VIA: HCPCS | Performed by: STUDENT IN AN ORGANIZED HEALTH CARE EDUCATION/TRAINING PROGRAM

## 2024-04-01 PROCEDURE — 97530 THERAPEUTIC ACTIVITIES: CPT

## 2024-04-01 PROCEDURE — 2580000003 HC RX 258: Performed by: NURSE PRACTITIONER

## 2024-04-01 PROCEDURE — 97161 PT EVAL LOW COMPLEX 20 MIN: CPT

## 2024-04-01 PROCEDURE — 80053 COMPREHEN METABOLIC PANEL: CPT

## 2024-04-01 PROCEDURE — 97165 OT EVAL LOW COMPLEX 30 MIN: CPT

## 2024-04-01 PROCEDURE — 99239 HOSP IP/OBS DSCHRG MGMT >30: CPT | Performed by: STUDENT IN AN ORGANIZED HEALTH CARE EDUCATION/TRAINING PROGRAM

## 2024-04-01 PROCEDURE — 97535 SELF CARE MNGMENT TRAINING: CPT

## 2024-04-01 PROCEDURE — 2580000003 HC RX 258: Performed by: STUDENT IN AN ORGANIZED HEALTH CARE EDUCATION/TRAINING PROGRAM

## 2024-04-01 PROCEDURE — 36415 COLL VENOUS BLD VENIPUNCTURE: CPT

## 2024-04-01 PROCEDURE — 6370000000 HC RX 637 (ALT 250 FOR IP): Performed by: STUDENT IN AN ORGANIZED HEALTH CARE EDUCATION/TRAINING PROGRAM

## 2024-04-01 RX ORDER — SUCRALFATE 1 G/1
1 TABLET ORAL EVERY 8 HOURS SCHEDULED
Status: DISCONTINUED | OUTPATIENT
Start: 2024-04-01 | End: 2024-04-01 | Stop reason: HOSPADM

## 2024-04-01 RX ORDER — LACTULOSE 10 G/15ML
30 SOLUTION ORAL 3 TIMES DAILY
Status: DISCONTINUED | OUTPATIENT
Start: 2024-04-01 | End: 2024-04-01 | Stop reason: HOSPADM

## 2024-04-01 RX ORDER — NADOLOL 20 MG/1
10 TABLET ORAL DAILY
Status: DISCONTINUED | OUTPATIENT
Start: 2024-04-01 | End: 2024-04-01 | Stop reason: HOSPADM

## 2024-04-01 RX ORDER — FERROUS SULFATE 300 MG/5ML
325 LIQUID (ML) ORAL 2 TIMES DAILY
Status: DISCONTINUED | OUTPATIENT
Start: 2024-04-01 | End: 2024-04-01 | Stop reason: HOSPADM

## 2024-04-01 RX ORDER — GABAPENTIN 100 MG/1
100 CAPSULE ORAL NIGHTLY
Status: DISCONTINUED | OUTPATIENT
Start: 2024-04-01 | End: 2024-04-01 | Stop reason: HOSPADM

## 2024-04-01 RX ORDER — FUROSEMIDE 20 MG/1
20 TABLET ORAL DAILY
Status: DISCONTINUED | OUTPATIENT
Start: 2024-04-01 | End: 2024-04-01 | Stop reason: HOSPADM

## 2024-04-01 RX ADMIN — MINERAL SUPPLEMENT IRON 300 MG / 5 ML STRENGTH LIQUID 100 PER BOX UNFLAVORED 325 MG: at 09:00

## 2024-04-01 RX ADMIN — LACTULOSE 30 G: 20 SOLUTION ORAL at 09:01

## 2024-04-01 RX ADMIN — NADOLOL 10 MG: 20 TABLET ORAL at 09:00

## 2024-04-01 RX ADMIN — SODIUM CHLORIDE, PRESERVATIVE FREE 40 MG: 5 INJECTION INTRAVENOUS at 09:02

## 2024-04-01 RX ADMIN — SODIUM CHLORIDE, PRESERVATIVE FREE 20 ML: 5 INJECTION INTRAVENOUS at 09:01

## 2024-04-01 RX ADMIN — FUROSEMIDE 20 MG: 20 TABLET ORAL at 09:02

## 2024-04-01 NOTE — PROGRESS NOTES
0900 - Patient given 500 mL of oral contrast through OG tube, per patient request. Patient tolerated well.     0930 - Second half off 500 mL of oral contrast given through PEG tub per General Surgery request. Patient tolerated well.     
Comprehensive Nutrition Assessment    Type and Reason for Visit:  Positive Nutrition Screen    Nutrition Recommendations/Plan:   Advance diet as tolerated  Start high kcal, high protein ONS BID (chocolate)  Monitor intakes, ONS, weight, labs, GI status, meal time behavior.     Malnutrition Assessment:  Malnutrition Status:  Moderate malnutrition (04/01/24 1131)    Context:  Acute Illness     Findings of the 6 clinical characteristics of malnutrition:  Energy Intake:  75% or less of estimated energy requirements for 7 or more days  Weight Loss:  Greater than 7.5% over 3 months     Body Fat Loss:  Unable to assess     Muscle Mass Loss:  Unable to assess    Fluid Accumulation:  Unable to assess     Strength:  Not Performed    Nutrition Assessment:    Adm SBO. PMH significant for hepatitis C, liver cirrhosis, hx of alcohol use, with esophageal CA. RNC for weight loss. Noted significant 8.3% weight loss over 3 months, pt confirms. States he has had no appetite for the last few months. PTA consumes 1 meal/day (dinner) and will snack on fruit, veggie, peanut butter, cottage cheese. \"no longer has a taste for meat\" per pt. States NPO x3days PTA. This AM, pt on FLD and reports consuming cottage cheese and cream of wheat. NPO x 3days PTA er  No abdominal pain. States he is ready for soft diet. Discussed with RN, diet upgraded. Pt also reports drinking 2 Ensure ONS per day at home. Prefers chocolate. LBM 3/31    Nutrition Related Findings:    Meds/labs reviewed.         Current Nutrition Intake & Therapies:    Average Meal Intake: 26-50%  Average Supplements Intake: None Ordered  ADULT DIET; Dysphagia - Soft and Bite Sized  Additional Calorie Sources:  None    Anthropometric Measures:  Height: 177.8 cm (5' 10\")  Ideal Body Weight (IBW): 166 lbs (75 kg)    Admission Body Weight: 79.3 kg (174 lb 13.2 oz)  Current Body Weight: 79.3 kg (174 lb 13.2 oz), 105.3 % IBW. Weight Source: Bed Scale  Current BMI (kg/m2): 25.1  Usual 
[facility]  Office: [phone]  Frankie Gonzalez DO, Natan Blount DO, Souleymane Thao DO, Sigifredo Sanchez DO, Donn Guy MD, Peri Shaffer MD, Stacia Howell MD, Annabelle Tanner MD,  Jesus Lockhart MD, Amisha Mc MD, Satinder Bailey MD,  Navya Calvillo DO, Ronnie Jimenez MD, Lopez Zambrano MD, Mehul Gonzalez DO, Meagan Whitlock MD,  Arpan York DO, Nallely Barnhart MD, Aubrie Obrien MD, Abby Adkins MD, Mayte Mullins MD,  Declan Nunez MD, Mauri Dukes MD, Nick Burciaga MD, Oskar Ortiz MD, Jai Taylor MD, Elvia Diallo MD, Steven See DO, Jesus Diaz DO, Tamanna Sutton MD,  Wade Hay MD, Shirley Waterhouse, CNP,  Rin Rosado CNP, Osvaldo Ma, CNP,  Jane Baird, DNP, Yessenia Fernando, CNP, Margot Santana, CNP, Hamida Brewer CNP, Jasmin Schmitt CNP, Ellen Seth PA-C, Lianne Kaplan PA-C, Camille Posey, CNP, Michelle Trent, CNP, Kalani Schneider, CNP, Deanna Baugh, CNP, Renu Dean, CNP, Veronica Reich, CNS, Emmie Manning, CNP, Marcelle Thurman CNP, Tracy Schwab, CNP         [facility]   IN-PATIENT SERVICE   [facility]    Progress Note    4/1/2024    7:01 AM    Name:   Frank Lisa  MRN:     [id number]     Acct:      [id number]   Room:   0240/0240-01   Day:  1  Admit Date:  3/31/2024  3:20 AM    PCP:   Lopez Rosario PA  Code Status:  Full Code    Subjective:     C/C:   Chief Complaint   Patient presents with    Abdominal Pain    Nausea     Interval History Status: improved.     Patient seen and examined at bedside this morning.  OG tube was removed last night.  Patient did have well-formed bowel movement last night as well.  Abdominal pain has improved.  Patient denies having any nausea, vomiting, chest pain or shortness of breath.    Brief History:     Frank Lisa is a 63 y.o. male with PMHx of DVT, liver cirrhosis 2/2 Hep C (s/p treatement) and alcohol abuse s/p TIPS, esophageal adenocarcinoma status post 
Maggy Served Dr. Diana Wise in regards to patient asking if his OG can come out.      above stated yes.     Writer Maggy Served back, can you please put in order.  
Maggy Served Dr. Diana Wise in regards to patient getting back to the floor and if they want the OG back to LIWS.      above stated yes.   
Maggy Served Dr. Diana Wise in regards to patient's family wanting to speak to them in regards to if patient is going to have surgery or not. Also family asking if patient where to have surgery if they are able to remove patient's PEG tube as well, because PEG tube scheduled to be removed on 4/8/2024.     Dr above stated they are not able to come up at this time. Patient is not having surgery today. Someone from their team will try to come back later.   
Maggy Served Dr. Elvia Diallo in regards to patient needing orders for his OG and if it needs to be on LIWS. Also if they want the patient to drink the oral contrast for state CT.     Orders adjusted by doctor above. Also to ask General Surgery team about the oral contrast.   
Maggy Served Dr. Lloyd Alberto in regards to patient's CT with oral contrast and if they want patient to receive oral contrast with OG in place and patient being NPO.     Dr benoit stated they want patient to have oral contrast for CT scan.   
Occupational Therapy    [facility]  Occupational Therapy Not Seen Note    DATE: 3/31/2024    NAME: Frank Lisa  MRN: [id number]   : 1960      Patient not seen this date for Occupational Therapy due to:    Other: Pt with OG tube to suction, pt gagging on tube, fear of aspiration. Per RN hold today and check tomorrow. Pt also going for CT scan of abdomen/pelvis.    Next Scheduled Treatment: 2024    Electronically signed by STACEY VARGAS on 3/31/2024 at 10:22 AM   
Occupational Therapy  Facility/Department: [address] ORTHO/MED SURG  Occupational Therapy Initial Assessment    Name: Frank Lisa  : 1960  MRN: [id number]  Date of Service: 2024    Chief Complaint   Patient presents with    Abdominal Pain    Nausea     Discharge Recommendations: No therapy recommended at discharge.    Patient Diagnosis(es): The encounter diagnosis was Small bowel obstruction (HCC).  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), Alcoholic cirrhosis of liver with ascites (HCC), Anemia, Anxiety, Arthritis, Back pain, chronic, Lezama esophagus, Bleeding gastric varices, BPH (benign prostatic hypertrophy), Cholelithiasis, Cirrhosis (HCC), COVID-19, COVID-19 vaccine series completed, DDD (degenerative disc disease), lumbar, Depression, Esophageal cancer (HCC), Esophageal varices (HCC), Fatty liver, GERD (gastroesophageal reflux disease), GI bleed, Heart murmur, Hep C w/o coma, chronic (HCC), Hiatal hernia, History of alcohol abuse, History of blood transfusion, History of colon polyps, History of tobacco abuse, [place] (hard of hearing), Hyperlipidemia, Hypertension, Hyponatremia, Hypotension, Mastoid disorder, bilateral, Pericardial effusion, PONV (postoperative nausea and vomiting), Poor venous access, Port-A-Cath in place, Portal hypertension (HCC), Sciatica, Secondary esophageal varices (HCC), Shortness of breath, Spinal stenosis, Stomach ulcer, Thrombocytopenia (HCC), Tubular adenoma of colon, Under care of team, Under care of team, Vitamin D deficiency, and Wears glasses.  Past Surgical History:  has a past surgical history that includes Bunionectomy; Nasal septum surgery; other surgical history (2016); Colonoscopy; Colonoscopy (10/05/2016); other surgical history (2016); other surgical history (2016); knee surgery (Left); Bunionectomy 
Patient discharged with all belongings. Patient ambulated out to family car with writer.   
Physical Therapy  Facility/Department: [address] ORTHO/MED SURG  Physical Therapy Initial Assessment    Name: Frank Lisa  : 1960  MRN: [id number]  Date of Service: 2024    Discharge Recommendations:  No therapy recommended at discharge   PT Equipment Recommendations  Equipment Needed: No      Patient Diagnosis(es): The encounter diagnosis was Small bowel obstruction (HCC).  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), Alcoholic cirrhosis of liver with ascites (HCC), Anemia, Anxiety, Arthritis, Back pain, chronic, Lezama esophagus, Bleeding gastric varices, BPH (benign prostatic hypertrophy), Cholelithiasis, Cirrhosis (HCC), COVID-19, COVID-19 vaccine series completed, DDD (degenerative disc disease), lumbar, Depression, Esophageal cancer (HCC), Esophageal varices (HCC), Fatty liver, GERD (gastroesophageal reflux disease), GI bleed, Heart murmur, Hep C w/o coma, chronic (HCC), Hiatal hernia, History of alcohol abuse, History of blood transfusion, History of colon polyps, History of tobacco abuse, [place] (hard of hearing), Hyperlipidemia, Hypertension, Hyponatremia, Hypotension, Mastoid disorder, bilateral, Pericardial effusion, PONV (postoperative nausea and vomiting), Poor venous access, Port-A-Cath in place, Portal hypertension (HCC), Sciatica, Secondary esophageal varices (HCC), Shortness of breath, Spinal stenosis, Stomach ulcer, Thrombocytopenia (HCC), Tubular adenoma of colon, Under care of team, Under care of team, Vitamin D deficiency, and Wears glasses.  Past Surgical History:  has a past surgical history that includes Bunionectomy; Nasal septum surgery; other surgical history (2016); Colonoscopy; Colonoscopy (10/05/2016); other surgical history (2016); other surgical history (2016); knee surgery (Left); Bunionectomy (Left); Endoscopy, colon, 
HISTORY: abd pain TECHNOLOGIST PROVIDED HISTORY: abd pain Decision Support Exception - unselect if not a suspected or confirmed emergency medical condition->Emergency Medical Condition (MA) Reason for Exam: abd pain Additional signs and symptoms: Lower abdominal pain after eating today, with nausea/vomiting FINDINGS: Lower Chest: Small right pleural effusion.  Trace pericardial effusion. Circumferential thickening of the distal esophagus may represent reflux esophagitis. Organs: The abdominal wall appears normal. The liver, spleen, pancreas, and adrenals appear normal.  Portal caval stent appears patent.  Peripherally calcified splenic artery aneurysm measures 27 mm. Kidneys appear normal. The bladder appears normal. GI/Bowel: Multiple distended loops of small bowel with air-fluid levels.  No bowel wall thickening or pneumatosis.  Anastomotic sutures transverse colon. The there appears to be a transition point at the right paracentral ventral hernia where there is a loop of small bowel and a narrow neck.  There is also a para umbilical hernia which is wide mouth containing loops of small bowel. Pelvis: Normal Peritoneum/Retroperitoneum: The abdominal aorta and iliac arteries are normal in caliber. There is no pathologic adenopathy.  Calcified plaque along the aorta and its branches. Bones/Soft Tissues: Spondylosis and vacuum disc phenomena. Pseudoarticulation and bony ankylosis L5 S1 on the left.  Avascular necrosis left femoral head.  Anterior wedge fracture T12.     1. High-grade small bowel obstruction with transition point at the right paracentral ventral hernia where there is a loop of small bowel and a narrow neck.  Surgical consultation recommended. 2. Para umbilical hernia containing loops of small bowel. 3. Small right pleural effusion. 4. Trace pericardial effusion. 5. Circumferential thickening distal esophagus may represent reflux esophagitis. 6. Portal caval stent appears patent. 7. Peripherally

## 2024-04-01 NOTE — CARE COORDINATION
Transition planning    Called and notified Lopez at [facility] of patient discharge today. Faxed home care orders and AVS to [facility] at [phone]

## 2024-04-01 NOTE — CARE COORDINATION
04/01/24 1139   Readmission Assessment   Number of Days since last admission? 1-7 days   Previous Disposition Home with Home Health   Who is being Interviewed Patient   What was the patient's/caregiver's perception as to why they think they needed to return back to the hospital? Other (Comment)  (c/o abdominal pain)   Did you visit your Primary Care Physician after you left the hospital, before you returned this time? No   Why weren't you able to visit your PCP? Did not have an appointment   Did you see a specialist, such as Cardiac, Pulmonary, Orthopedic Physician, etc. after you left the hospital? No   Who advised the patient to return to the hospital? Self-referral   Does the patient report anything that got in the way of taking their medications? No   In our efforts to provide the best possible care to you and others like you, can you think of anything that we could have done to help you after you left the hospital the first time, so that you might not have needed to return so soon? Other (Comment)  (no)

## 2024-04-01 NOTE — DISCHARGE SUMMARY
[facility]  Office: [phone]  Frankie Gonzalez DO, Natan Blount DO, oSuleymane Thao DO, Sigifredo Sanchez DO, Donn Guy MD, Peri Shaffer MD, Stacia Howell MD, Annabelle Tanner MD,  Jesus Lockhart MD, Amisha Mc MD, Satnider Bailey MD,  Navya Calvillo DO, Ronnie Jimenez MD, Lopez Zambrano MD, Mehul Gonzalez DO, Meagan Whitlock MD,  Arpan York DO, Nallely Barnhart MD, Aubrie Obrien MD, Abby Adkins MD, Mayte Mullins MD,  Declan Nunez MD, Mauri Dukes MD, Nick Burciaga MD, Oskar Ortiz MD, Jai Taylor MD, Elvia Diallo MD, Steven See DO, Jesus Diaz DO, Tamanna Sutton MD,  Wade Hay MD, Shirley Waterhouse, CNP,  Rin Rosado CNP, Osvaldo Ma, CNP,  Jane Baird, DNP, Yessenia Fernando, CNP, Margot Santana, CNP, Hamida Brewer CNP, Jasmin Schmitt, CNP, Ellen Seth PA-C, Lianne Kaplan PA-C, Camille Posey, CNP, Michelle Trent, CNP, Kalani Schneider, CNP, Deanna Baugh, CNP, Renu Dean, CNP, Veronica Reich, CNS, Emmie Manning, CNP, Marcelle Thurman CNP, Tracy Schwab, CNP         [facility]   IN-PATIENT SERVICE   [facility]    Discharge Summary     Patient ID: Frank Lisa  :  1960   MRN: [id number]     ACCOUNT:  [id number]   Patient's PCP: Lopez Rosario PA  Admit Date: 3/31/2024   Discharge Date: 2024     Length of Stay: 1  Code Status:  Full Code  Admitting Physician: No admitting provider for patient encounter.  Discharge Physician: Elvia Diallo MD     Active Discharge Diagnoses:     Hospital Problem Lists:  Principal Problem:    Small bowel obstruction (HCC)  Active Problems:    Moderate malnutrition (HCC)  Resolved Problems:    * No resolved hospital problems. *      Admission Condition:  fair     Discharged Condition: good    Hospital Stay:     Hospital Course:      Frank HSU Alyshaarykrista is a 63 y.o. male with PMHx of DVT, liver cirrhosis 2/2 Hep C (s/p treatement) and alcohol abuse s/p TIPS, esophageal  Provider signed all controlled medications (1 Adderall script and 3 Vyvanse scripts).    Called Christian, pt's father. No answer. LVM informing him of the above. Asked for a call if he runs into insurance concerns.

## 2024-04-01 NOTE — DISCHARGE SUMMARY
Contrast does extend into the colon, beyond the level of the anastomosis.  There is a gastrostomy tube.  Diastasis of the rectus abdominus musculature with protrusion of bowel loops anteriorly. Pelvis: There is contrast in the urinary bladder.  No significant bladder wall thickening is seen. Peritoneum/Retroperitoneum: There are vascular calcifications.  No definite lymphadenopathy.  No intraperitoneal free air. Bones/Soft Tissues: Similar appearance of the bones.     Previously noted bowel containing hernia in the right abdominal wall is no longer seen; there is only fat herniation at this time. Persistent but decreased dilatation of small-bowel loops.  Prior partial colectomy, with contrast seen extending beyond the anastomosis, into the colon.  Findings could represent an improving obstruction. Small bilateral pleural effusions.     XR ABDOMEN FOR NG/OG/NE TUBE PLACEMENT    Result Date: 3/31/2024  EXAMINATION: ONE SUPINE XRAY VIEW(S) OF THE ABDOMEN 3/31/2024 12:15 am COMPARISON: 03/30/2024 HISTORY: ORDERING SYSTEM PROVIDED HISTORY: Confirmation of course of NG/OG/NE tube and location of tip of tube TECHNOLOGIST PROVIDED HISTORY: Confirmation of course of NG/OG/NE tube and location of tip of tube Portable?->Yes Reason for Exam: NG PLACEMENT FINDINGS: The enteric catheter side hole and tip are seen in the stomach, with the tip in the region of the distal stomach.  A tips stent and embolization coils are noted.  No acute process seen in the upper abdomen.  Rim calcified structure in the upper abdomen related to a thrombosed splenic artery aneurysm.  No acute osseous abnormality is identified.     Enteric catheter tip terminates in the distal stomach.     CT ABDOMEN PELVIS W IV CONTRAST Additional Contrast? None    Result Date: 3/30/2024  EXAMINATION: CT OF THE ABDOMEN AND PELVIS WITH CONTRAST 3/30/2024 10:06 pm TECHNIQUE: CT of the abdomen and pelvis was performed with the administration of intravenous contrast.

## 2024-04-01 NOTE — DISCHARGE INSTR - COC
Gynecomastia, male N62    Lumbar radiculitis M54.16    Lumbar disc herniation M51.26    Tinnitus H93.19    Eustachian tube dysfunction H69.90    Ganglion cyst M67.40    Carpal tunnel syndrome of right wrist G56.01    History of hepatitis C Z86.19    Vitamin D deficiency E55.9    Pure hypercholesterolemia E78.00    Hypokalemia E87.6    Essential hypertension I10    Recurrent major depressive disorder in partial remission (HCC) F33.41    S/P epidural steroid injection Z92.241    Elevated LFTs R79.89    Seasonal allergies J30.2    Lumbar facet arthropathy M47.816    Cervical facet syndrome M47.812    Thrombocytopenia (HCC) D69.6    Hepatitis C virus infection resolved after antiviral drug therapy Z86.19    Alcohol abuse F10.10    Altered mental status R41.82    Hypocalcemia E83.51    Hypophosphatemia E83.39    Malignant neoplasm of lower third of esophagus (HCC) C15.5    COVID-19 U07.1    Anxiety F41.9    Current smoker F17.200    Severe comorbid illness R69    Gait instability R26.81    Abnormal findings on diagnostic imaging of spine R93.7    Foraminal stenosis of cervical region M48.02    Spinal stenosis of lumbar region with neurogenic claudication M48.062    Low hemoglobin D64.9    Anemia D64.9    Hypotension I95.9    Former smoker, 50+ pack years, quit 2016 Z87.891    HLD (hyperlipidemia) E78.5    Esophageal adenocarcinoma (HCC) C15.9    Acute on chronic anemia D64.9    Acute kidney injury (HCC) N17.9    Ascites due to alcoholic cirrhosis (HCC) K70.31    Shortness of breath R06.02    Drop in hemoglobin R71.0    Esophageal polyp K22.81    Acute kidney failure, unspecified (HCC) N17.9    Muscle weakness (generalized) M62.81    Other abnormalities of gait and mobility R26.89    GI bleed K92.2    Goals of care, counseling/discussion Z71.89    ACP (advance care planning) Z71.89    Palliative care encounter Z51.5    S/P TIPS (transjugular intrahepatic portosystemic shunt) Z95.828    Acute metabolic encephalopathy

## 2024-04-01 NOTE — PLAN OF CARE
Problem: Discharge Planning  Goal: Discharge to home or other facility with appropriate resources  4/1/2024 1240 by Osvaldo Shanks, RN  Outcome: Completed  4/1/2024 1029 by Osvaldo Shanks, RN  Outcome: Progressing     Problem: Skin/Tissue Integrity  Goal: Absence of new skin breakdown  Description: 1.  Monitor for areas of redness and/or skin breakdown  2.  Assess vascular access sites hourly  3.  Every 4-6 hours minimum:  Change oxygen saturation probe site  4.  Every 4-6 hours:  If on nasal continuous positive airway pressure, respiratory therapy assess nares and determine need for appliance change or resting period.  4/1/2024 1240 by Osvaldo Shanks, RN  Outcome: Completed  4/1/2024 1029 by Osvaldo Shanks, RN  Outcome: Progressing     Problem: Safety - Adult  Goal: Free from fall injury  4/1/2024 1240 by Osvaldo hSanks, RN  Outcome: Completed  4/1/2024 1029 by Osvaldo Shanks, RN  Outcome: Progressing     Problem: Nutrition Deficit:  Goal: Optimize nutritional status  Outcome: Completed

## 2024-04-01 NOTE — CARE COORDINATION
Case Management Assessment  Initial Evaluation    Date/Time of Evaluation: 4/1/2024 11:44 AM  Assessment Completed by: Veda Kurtz RN    If patient is discharged prior to next notation, then this note serves as note for discharge by case management.    Patient Name: Frank Lisa                   YOB: 1960  Diagnosis: Small bowel obstruction (HCC) [K56.609]  SBO (small bowel obstruction) (HCC) [K56.609]                   Date / Time: 3/31/2024  3:20 AM    Patient Admission Status: Inpatient   Readmission Risk (Low < 19, Mod (19-27), High > 27): Readmission Risk Score: 32.9    Current PCP: Lopez Rosario PA  PCP verified by CM? Yes    Chart Reviewed: Yes      History Provided by: Patient  Patient Orientation: Alert and Oriented    Patient Cognition: Alert    Hospitalization in the last 30 days (Readmission):  Yes    If yes, Readmission Assessment in CM Navigator will be completed.    Advance Directives:      Code Status: Full Code   Patient's Primary Decision Maker is: Legal Next of Kin    Primary Decision Maker: Nilam Lisa - Ex-Spouse - [phone]    Discharge Planning:    Patient lives with: Alone Type of Home: House  Primary Care Giver: Self  Patient Support Systems include: Family Members (ex-wife)   Current Financial resources:    Current community resources:    Current services prior to admission: Durable Medical Equipment            Current DME: Shower Chair, Cane, Walker (Has DME, did not use at home)            Type of Home Care services:  None    ADLS  Prior functional level: Independent in ADLs/IADLs  Current functional level: Independent in ADLs/IADLs    PT AM-PAC: 24 /24  OT AM-PAC: 24 /24    Family can provide assistance at DC: Yes (ex-wife as needed)  Would you like Case Management to discuss the discharge plan with any other family members/significant others, and if so, who? Yes (ex-wife Nilam)  Plans to Return to Present Housing: Yes  Other Identified

## 2024-04-02 ENCOUNTER — CARE COORDINATION (OUTPATIENT)
Dept: CASE MANAGEMENT | Age: 64
End: 2024-04-02

## 2024-04-02 NOTE — CARE COORDINATION
Care Transitions Initial Follow Up Call - reached Nilam - patient's primary contact since patient does not have a listed usable # on EPIC chart    Reached [facility] after talking with Nilam, primary contact and ex-wife - updated HC with patient's new contact # (old phone is lost, so just got new phone with new contact #)    Call within 2 business days of discharge: Yes      Patient: Frank Lisa   Patient : 1960   MRN: [id number]    Reason for Admission: resolved SBO (hx ileostomy reversal), Hx cirrhosis, TIPS  Discharge Date: 24   RARS: Readmission Risk Score: 33.3      Last Discharge Facility       Date Complaint Diagnosis Description Type Department Provider    3/31/24 Abdominal Pain; Nausea Small bowel obstruction (HCC) ED to Hosp-Admission (Discharged) (ADMITTED) [facility] Elvia Diallo MD; Jeramie Suárez...            Was this an external facility discharge? No   Challenges to be reviewed by the provider   Additional needs identified to be addressed with provider:     Upcoming HFU is scheduled                    Readmission - [facility] 3/26-3/28 with SBO s/p ileostomy reversal, PEG to be removed per GI as outpatient. Discharged home with [facility].    I reached  Nilam, primary contact and ex-wife who tries to help patient out and take him to appts.  She saw patient after DC, but not yet while back at home.  She said he initially has been \"doing great.\"  Eating, drinking OK - has not been using the PEG tube and has plans to get it removed per GI.  Patient plans to f/u with Dr Guo for PEG removal.    Nilam is scheduling appts with GI + neuro since she drives patient to his appts.  oG plans to f/u with patient - I informed [facility] of patient's new phone # as well as Nilam as primary contact.    Informed Nilam of Care Transitions + CTN contact #    Plan for next call: symptom management-check if any s/s - check eating, drinking, ABD pain - check plan to dc PEG   medication

## 2024-04-03 ENCOUNTER — CARE COORDINATION (OUTPATIENT)
Dept: CASE MANAGEMENT | Age: 64
End: 2024-04-03

## 2024-04-03 NOTE — CARE COORDINATION
Care Transitions Initial Follow Up Call - Attempted initial 24 hour transitional call to patient.  Left VM to return call directly to CTN    Will try once more to complete the initial call as patient lost his phone and contact # has changed - LVM on new contact # - I spoke with Nilam, patient's ex-wife yesterday.    I contacted [facility] and they are resuming patient's care tomorrow - [facility] to CTN re: medication check, appt.     Call within 2 business days of discharge: Yes       Patient: Frank Lisa          Patient : 1960   MRN: [id number]             Reason for Admission: resolved SBO (hx ileostomy reversal), Hx cirrhosis, TIPS  Discharge Date: 24           RARS: Readmission Risk Score: 33.3      Last Discharge Facility       Date Complaint Diagnosis Description Type Department Provider    3/31/24 Abdominal Pain; Nausea Small bowel obstruction (HCC) ED to Hosp-Admission (Discharged) (ADMITTED) [facility] Elvia Diallo MD; Jeramie Suárez...            Was this an external facility discharge? No   Non-face-to-face services provided:  Obtained and reviewed discharge summary and/or continuity of care documents  Communication with home health agencies or other community services the patient is currently using-Confirmed resumption of care scheduled for tomorrow,     Follow Up  Future Appointments   Date Time Provider Department Center   2024  2:30 PM Lopez Rosario PA [facility]   2024 11:15 AM Garrison Durán MD [facility]   2024 11:30 AM Ashleigh Sotelo DO [facility]       Care Transition Nurse provided contact information.      Plan for next call: symptom management-check s/s - any more fatigue/dizziness - check any plans/appt with GI for PEG removal with Dr Guo  medication management-med rec - 1111F either with patient or Go        Grace Gaffney RN

## 2024-04-04 ENCOUNTER — TELEPHONE (OUTPATIENT)
Dept: PRIMARY CARE CLINIC | Age: 64
End: 2024-04-04

## 2024-04-04 ENCOUNTER — CARE COORDINATION (OUTPATIENT)
Dept: CASE MANAGEMENT | Age: 64
End: 2024-04-04

## 2024-04-04 DIAGNOSIS — K70.31 ASCITES DUE TO ALCOHOLIC CIRRHOSIS (HCC): Primary | ICD-10-CM

## 2024-04-04 PROCEDURE — 1111F DSCHRG MED/CURRENT MED MERGE: CPT | Performed by: PHYSICIAN ASSISTANT

## 2024-04-04 RX ORDER — LANSOPRAZOLE 30 MG/1
30 TABLET, ORALLY DISINTEGRATING, DELAYED RELEASE ORAL
Qty: 30 TABLET | Refills: 0 | Status: SHIPPED | OUTPATIENT
Start: 2024-04-04

## 2024-04-04 NOTE — CARE COORDINATION
Care Transitions Initial Follow Up Call - attempting once more to contact patient for initial call since only have been able to reach ex- wife.    Attempted initial 24 hour transitional call #3 to patient.  Left another VM to return call  - unable to reach patient, but confirmed medications + home care visit with [facility].       Will end CT program since unable transition without speaking with patient, but was able to confirm that home care is in place and following patient.    Call attempted to patient within 2 business days of discharge: Yes        Patient: Frank Lisa          Patient : 1960   MRN: [id number]             Reason for Admission: resolved SBO (hx ileostomy reversal), Hx cirrhosis, TIPS  Discharge Date: 24           RARS: Readmission Risk Score: 33.3      Last Discharge Facility       Date Complaint Diagnosis Description Type Department Provider    3/31/24 Abdominal Pain; Nausea Small bowel obstruction (HCC) ED to Hosp-Admission (Discharged) (ADMITTED) [facility] Elvia Diallo MD; Jeramie Suárez...            Was this an external facility discharge? No Incoming return call back from [facility]  nurse[facility]go  who saw patient this morning - she also had to go to patient's home in order to reach him - despite his new phone and contact #.  Informed me that he is taking all medications as listed on AVS - she has only 2 questions that she is checking with PCP about - prevacid + dose of lactulose, so will be able to complete med rec + 1111F.      Medication reconciliation was performed with caregiver, Ugo - home care nurse who verbalizes understanding of administration of home medications. Medications reviewed, 1111F entered: yes  Non-face-to-face services provided:  Scheduled appointment with PCP-  Obtained and reviewed discharge summary and/or continuity of care documents  Communication with home health agencies or other community services the patient is currently using-[facility]

## 2024-04-04 NOTE — TELEPHONE ENCOUNTER
This is prescribed by gastro office. Please have them contact patients GI provider for clarification.

## 2024-04-04 NOTE — TELEPHONE ENCOUNTER
Ugo with [facility] calling asking for clarification on lactulose. States original rx was for 25ml TID and now has rx for 45 mls TID. Asking which one is correct?     [facility] [phone]

## 2024-04-05 ENCOUNTER — HOSPITAL ENCOUNTER (OUTPATIENT)
Age: 64
Discharge: HOME | End: 2024-04-05
Payer: COMMERCIAL

## 2024-04-05 LAB
ANION GAP SERPL CALCULATED.3IONS-SCNC: 9 MMOL/L (ref 9–17)
BUN SERPL-MCNC: 8 MG/DL (ref 8–23)
CALCIUM SERPL-MCNC: 8.1 MG/DL (ref 8.6–10.4)
CHLORIDE SERPL-SCNC: 108 MMOL/L (ref 98–107)
CO2 SERPL-SCNC: 23 MMOL/L (ref 20–31)
CREAT SERPL-MCNC: 0.7 MG/DL (ref 0.7–1.2)
ERYTHROCYTE [DISTWIDTH] IN BLOOD BY AUTOMATED COUNT: 14.8 % (ref 11.5–14.9)
GFR SERPL CREATININE-BSD FRML MDRD: >90 ML/MIN/1.73M2
GLUCOSE SERPL-MCNC: 78 MG/DL (ref 70–99)
HCT VFR BLD AUTO: 29.8 % (ref 41–53)
HGB BLD-MCNC: 9.8 G/DL (ref 13.5–17.5)
MCH RBC QN AUTO: 32.1 PG (ref 26–34)
MCHC RBC AUTO-ENTMCNC: 32.7 G/DL (ref 31–37)
MCV RBC AUTO: 98.1 FL (ref 80–100)
PLATELET # BLD AUTO: 188 K/UL (ref 150–450)
PMV BLD AUTO: 7.8 FL (ref 6–12)
POTASSIUM SERPL-SCNC: 4.5 MMOL/L (ref 3.7–5.3)
RBC # BLD AUTO: 3.04 M/UL (ref 4.5–5.9)
SODIUM SERPL-SCNC: 140 MMOL/L (ref 135–144)
WBC OTHER # BLD: 4.7 K/UL (ref 3.5–11)

## 2024-04-05 PROCEDURE — 80048 BASIC METABOLIC PNL TOTAL CA: CPT

## 2024-04-05 PROCEDURE — 36415 COLL VENOUS BLD VENIPUNCTURE: CPT

## 2024-04-05 PROCEDURE — 85027 COMPLETE CBC AUTOMATED: CPT

## 2024-04-09 ENCOUNTER — TELEPHONE (OUTPATIENT)
Dept: PHARMACY | Age: 64
End: 2024-04-09

## 2024-04-09 ENCOUNTER — OFFICE VISIT (OUTPATIENT)
Dept: PRIMARY CARE CLINIC | Age: 64
End: 2024-04-09

## 2024-04-09 VITALS
HEIGHT: 70 IN | OXYGEN SATURATION: 97 % | WEIGHT: 191.4 LBS | DIASTOLIC BLOOD PRESSURE: 64 MMHG | BODY MASS INDEX: 27.4 KG/M2 | SYSTOLIC BLOOD PRESSURE: 110 MMHG | HEART RATE: 69 BPM

## 2024-04-09 DIAGNOSIS — K70.30 ALCOHOLIC CIRRHOSIS, UNSPECIFIED WHETHER ASCITES PRESENT (HCC): ICD-10-CM

## 2024-04-09 DIAGNOSIS — C15.9 ESOPHAGEAL ADENOCARCINOMA (HCC): ICD-10-CM

## 2024-04-09 DIAGNOSIS — I82.621 ACUTE DEEP VEIN THROMBOSIS (DVT) OF BRACHIAL VEIN OF RIGHT UPPER EXTREMITY (HCC): ICD-10-CM

## 2024-04-09 DIAGNOSIS — B18.1 CHRONIC VIRAL HEPATITIS B WITHOUT DELTA AGENT AND WITHOUT COMA (HCC): ICD-10-CM

## 2024-04-09 DIAGNOSIS — I10 ESSENTIAL HYPERTENSION: ICD-10-CM

## 2024-04-09 DIAGNOSIS — Z71.89 ACP (ADVANCE CARE PLANNING): ICD-10-CM

## 2024-04-09 DIAGNOSIS — C15.5 MALIGNANT NEOPLASM OF LOWER THIRD OF ESOPHAGUS (HCC): ICD-10-CM

## 2024-04-09 DIAGNOSIS — B18.2 CHRONIC HEPATITIS C WITHOUT HEPATIC COMA (HCC): ICD-10-CM

## 2024-04-09 DIAGNOSIS — K74.60 DECOMPENSATION OF CIRRHOSIS OF LIVER (HCC): ICD-10-CM

## 2024-04-09 DIAGNOSIS — Z09 HOSPITAL DISCHARGE FOLLOW-UP: Primary | ICD-10-CM

## 2024-04-09 DIAGNOSIS — F33.41 RECURRENT MAJOR DEPRESSIVE DISORDER IN PARTIAL REMISSION (HCC): ICD-10-CM

## 2024-04-09 DIAGNOSIS — K56.609 SMALL BOWEL OBSTRUCTION (HCC): ICD-10-CM

## 2024-04-09 DIAGNOSIS — K72.90 DECOMPENSATION OF CIRRHOSIS OF LIVER (HCC): ICD-10-CM

## 2024-04-09 NOTE — PATIENT INSTRUCTIONS
Advance Care Planning     Advance Care Planning opens a door to talk about and write down your wishes before a sudden accident or illness.  Make your goals, values, and preferences known.     This puts you in the ’s seat and helps others know what matters most to you so they won’t have to guess.      Where can you learn more?    Go to [email or web address]   to learn how to:    Name someone you trust to make healthcare decisions for you, only if you can’t. (Healthcare Power of )    Document your wishes for care if you were seriously ill and not expected to recover or are approaching end of life. (Advance Directive or Living Will)    The same page can be found using the QR code below.

## 2024-04-09 NOTE — PROGRESS NOTES
hospital     Medication List            Accurate as of April 9, 2024  3:16 PM. If you have any questions, ask your nurse or doctor.                CHANGE how you take these medications      furosemide 20 MG tablet  Commonly known as: LASIX  Take 1 tablet by mouth daily  What changed: how to take this     gabapentin 100 MG capsule  Commonly known as: NEURONTIN  Take 1 capsule by mouth nightly for 30 days.  What changed: how to take this     magnesium 200 MG Tabs tablet  Take 1 tablet by mouth daily  What changed: how to take this     potassium chloride 20 MEQ/15ML (10%) oral solution  Take 30 mLs by mouth daily  What changed: how to take this            CONTINUE taking these medications      CENTRUM/CERTA-LUCILLE with minerals oral solution  15 mLs by Per G Tube route daily     ferrous sulfate 325 (65 Fe) MG tablet  Commonly known as: FeroSul  1 tablet by PEG Tube route 2 times daily     lactulose 10 GM/15ML solution  Commonly known as: CHRONULAC  45 mLs by PEG Tube route 3 times daily Hold for > 3 bms/day     lansoprazole 30 MG disintegrating tablet  Commonly known as: PREVACID SOLUTAB  1 tablet by Per G Tube route every morning (before breakfast)     nadolol 20 MG tablet  Commonly known as: CORGARD  0.5 tablets by Per G Tube route daily     ondansetron 4 MG disintegrating tablet  Commonly known as: ZOFRAN-ODT  dissolve 1 tablet by mouth every 8 hours if needed for nausea and vomiting     sucralfate 1 GM tablet  Commonly known as: CARAFATE  1 tablet by PEG Tube route every 8 hours for 14 days                Medications marked \"taking\" at this time  Outpatient Medications Marked as Taking for the 4/9/24 encounter (Office Visit) with Lopez Rosario PA   Medication Sig Dispense Refill    lansoprazole (PREVACID SOLUTAB) 30 MG disintegrating tablet 1 tablet by Per G Tube route every morning (before breakfast) 30 tablet 0    potassium chloride 20 MEQ/15ML (10%) oral solution Take 30 mLs by mouth daily (Patient taking

## 2024-04-09 NOTE — TELEPHONE ENCOUNTER
New referral to [facility] for CMR. Called patient to set up appointment.  left requesting a call back    Moraima Montero D, BCPS, CACP  [facility]  4/9/2024 5:32 PM

## 2024-04-10 ENCOUNTER — CLINICAL DOCUMENTATION (OUTPATIENT)
Dept: SPIRITUAL SERVICES | Age: 64
End: 2024-04-10

## 2024-04-10 NOTE — ACP (ADVANCE CARE PLANNING)
Advance Care Planning     Advance Care Planning Clinical Specialist  Conversation Note      Date of ACP Conversation: 4/10/2024    Conversation Conducted with: Patient with Decision Making Capacity    ACP Clinical Specialist: UDAY Gunn    Healthcare Decision Maker:     Current Designated Healthcare Decision Maker:     Primary Decision Maker: Nilam Lisa - Ex-Spouse - [phone]    Today we documented Decision Maker(s) consistent with ACP documents on file.    Care Preferences    Hospitalization:  \"If your health worsens and it becomes clear that your chance of recovery is unlikely, what would your preference be regarding hospitalization?\"    Choice:  [] The patient wants hospitalization.  [] The patient prefers comfort-focused treatment without hospitalization.    Ventilation:  \"If you were in your present state of health and suddenly became very ill and were unable to breathe on your own, what would your preference be about the use of a ventilator (breathing machine) if it were available to you?\"      If the patient would desire the use of ventilator (breathing machine), answer \"yes\".  If not, \"no\": yes-at this time pt would want use of ventilator.    \"If your health worsens and it becomes clear that your chance of recovery is unlikely, what would your preference be about the use of a ventilator (breathing machine) if it were available to you?\"     Would the patient desire the use of ventilator (breathing machine)?: No-pt has living will on file.       Resuscitation  \"CPR works best to restart the heart when there is a sudden event, like a heart attack, in someone who is otherwise healthy. Unfortunately, CPR does not typically restart the heart for people who have serious health conditions or who are very sick.\"    \"In the event your heart stopped as a result of an underlying serious health condition, would you want attempts to be made to restart your heart (answer \"yes\" for attempt to resuscitate)

## 2024-04-12 ENCOUNTER — TELEPHONE (OUTPATIENT)
Dept: PHARMACY | Age: 64
End: 2024-04-12

## 2024-04-12 NOTE — TELEPHONE ENCOUNTER
Called patient to set up appointment for medication review as per new referral. Appt is for 4/26/2024 at 1050 am.  Negra Herrera PharmD, BCPS 4/12/2024 7:04 PM  '

## 2024-04-12 NOTE — TELEPHONE ENCOUNTER
Called patient to update on the lab results and she is agreable with the plan to use 1/2 tab of Losartan 25 mg daily = 12.5 mg.   Negra ValeraD, BCPS 4/12/2024 6:47 PM

## 2024-04-15 NOTE — BRIEF OP NOTE
Brief Postoperative Note    Xavier Frey  YOB: 1960  [id number]    Pre-operative Diagnosis: Ascites    Post-operative Diagnosis: Same    Procedure: Paracentesis    Anesthesia: Local    Surgeons/Assistants: Dr. Ruy Glover    Estimated Blood Loss: less than 50     Complications: None    Specimens: Was Obtained: 1.3L sent to lab. Findings: Successful paracentesis of 10.7L cloudy yellow fluid aspirated. 75g IV Albumin given.     Electronically signed by Criselda Solis MD on 9/30/2022 at 1:58 PM Cardiology Progress Note    Moo Mcrae, male    : 1955    PCP: Eduardo Olivas MD    Attending/Consulting Provider: Joyce Pizarro MD    SUBJECTIVE:    No acute event overnight. Sitting up in chair, has his family on the phone. They wanted to know about his valve follow up.  He has a Valve clinic appointment  at [facility]  They are still deciding on a CANDIDO that has HD  HD TTHSAT  Breathing comfortably on RA    - Doing well and denies chest pain, shortness of breath.    Review of Systems: Review of Systems   Constitutional:  Positive for fatigue.   Eyes: Negative.    Respiratory: Negative.     Gastrointestinal: Negative.    Genitourinary:  Positive for difficulty urinating.        HD TTHSAT   Neurological: Negative.    Psychiatric/Behavioral: Negative.          Medications:    Prior to Admission medications    Medication Sig Start Date End Date Taking? Authorizing Provider   acetaminophen (TYLENOL) 325 MG tablet Take 2 tablets by mouth every 6 hours as needed for Pain. 4/15/24  Yes Joyce Pizarro MD   AMIODarone (PACERONE) 200 MG tablet Take 1 tablet by mouth daily. 24  Yes Joyce Pizarro MD   aspirin (ECOTRIN) 81 MG EC tablet Take 1 tablet by mouth daily. 24  Yes Joyce Pizarro MD   benzonatate (TESSALON PERLES) 100 MG capsule Take 1 capsule by mouth 3 times daily as needed for Cough. 4/15/24  Yes Joyce Pizarro MD   Heparin Sodium, Porcine, (heparin, porcine,) 5000 UNIT/ML injectable solution Inject 1 mL into the skin every 8 hours. 4/15/24  Yes Joyce Pizarro MD   insulin glargine (LANTUS) 100 UNIT/ML vial solution Inject 5 Units into the skin nightly. 4/15/24  Yes Joyce Pizarro MD   insulin lispro 100 UNIT/ML injectable solution Inject 9 Units into the skin in the morning and 9 Units at noon and 9 Units in the evening. Inject with meals. 4/15/24  Yes Joyce Pizarro MD   ipratropium-albuterol (DUONEB) 0.5-2.5 (3) MG/3ML nebulizer solution Take 3 mLs by nebulization every 4 hours. 4/15/24  Yes Juarez  MD Joyce   melatonin 3 MG Take 2 tablets by mouth nightly as needed. 4/15/24  Yes Joyce Pizarro MD   nystatin (MYCOSTATIN) 026714 UNIT/GM powder Apply topically 2 times daily. 4/15/24  Yes Joyce Pizarro MD   polyethylene glycol (MIRALAX) 17 g packet Take 17 g by mouth daily as needed. Stir and dissolve powder in any 4 to 8 ounces of beverage, then drink. 4/15/24  Yes Joyce Pizarro MD   balsalazide (COLAZAL) 750 MG capsule Take 2,250 mg by mouth daily.   Yes Provider, Outside   torsemide (DEMADEX) 20 MG tablet Take 2 tablets by mouth 2 times daily. 12/8/23  Yes Bella Levine PA-C   potassium CHLORIDE (KLOR-CON M) 20 MEQ [place] ER tablet Take 1 tablet by mouth daily. 11/24/23  Yes Bella Levine PA-C   allopurinol (ZYLOPRIM) 100 MG tablet Take 100 mg by mouth daily. 9/29/23  Yes Provider, Outside   empagliflozin (Jardiance) 10 MG tablet Take 1 tablet by mouth daily. 10/27/23  Yes Provider, Outside   fluticasone-salmeterol 250-50 MCG/ACT inhaler Inhale 1 puff into the lungs in the morning and 1 puff in the evening. 7/27/23  Yes Provider, Outside   Jakafi 15 MG tablet Take 15 mg by mouth every 12 hours. 10/12/23  Yes Provider, Outside   losartan (COZAAR) 25 MG tablet Take 25 mg by mouth daily. 2/19/24 4/15/24  Provider, Outside   metoPROLOL succinate (TOPROL-XL) 25 MG 24 hr tablet Take 0.5 tablets by mouth daily. 12/22/23   Bella Levine PA-C   rosuvastatin (CRESTOR) 20 MG tablet Take 20 mg by mouth daily. 9/29/23   Provider, Outside   montelukast (SINGULAIR) 10 MG tablet Take 10 mg by mouth daily. 9/29/23 4/15/24  Provider, Outside       Current Facility-Administered Medications   Medication Dose Route Frequency Provider Last Rate Last Admin    sodium citrate anticoagulant 4 % flush 3 mL  3 mL Intracatheter PRN Jesus Alberto Miller MD        alteplase (CATHFLO ACTIVASE) injection 2 mg  2 mg Intracatheter PRN Jesus Alberto Miller MD        sodium chloride (NORMAL SALINE) 0.9 % bolus 100-200 mL  100-200 mL Intravenous  PRN Jesus Alberto Miller MD        sodium citrate anticoagulant 4 % flush 3 mL  3 mL Intracatheter PRN Jesus Alberto Miller MD        sodium chloride (NORMAL SALINE) 0.9 % bolus 100-200 mL  100-200 mL Intravenous PRN Jesus Alberto Miller MD        AMIODarone (PACERONE) tablet 200 mg  200 mg Oral Daily Joyce Pizarro MD   200 mg at 04/15/24 0817    acetaminophen (TYLENOL) tablet 650 mg  650 mg Oral Q4H PRN Pato Marion        Or    acetaminophen (TYLENOL) suppository 650 mg  650 mg Rectal Q4H PRN Pato Marion        sodium chloride 0.9 % flush bag 25 mL  25 mL Intravenous PRN Pato Marion        sodium chloride 0.9 % injection 2 mL  2 mL Intracatheter 2 times per day Pato Marion   2 mL at 04/15/24 0822    benzonatate (TESSALON PERLES) capsule 100 mg  100 mg Oral TID PRN Joyce Pizarro MD   100 mg at 04/13/24 2011    sodium citrate anticoagulant 4 % flush 3 mL  3 mL Intracatheter PRN Jesus Alberto Miller MD        aspirin (ECOTRIN) enteric coated tablet 81 mg  81 mg Oral Daily Guera Lucero MD   81 mg at 04/15/24 0817    sodium citrate anticoagulant 4 % flush 3 mL  3 mL Intracatheter PRN Jesus Alberto Miller MD        guaiFENesin (MUCINEX) ER tablet 1,200 mg  1,200 mg Oral 2 times per day Marc Pearl MD   1,200 mg at 04/15/24 0818    ipratropium-albuterol (DUONEB) 0.5-2.5 (3) MG/3ML nebulizer solution 3 mL  3 mL Nebulization Q4H Resp Marc Pearl MD   3 mL at 04/15/24 1505    torsemide (DEMADEX) tablet 40 mg  40 mg Oral BID Carmen Gomez DO   40 mg at 04/15/24 0818    heparin (porcine) injection 5,000 Units  5,000 Units Subcutaneous 3 times per day Joyce Pizarro MD   5,000 Units at 04/15/24 0548    insulin lispro (ADMELOG, HumaLOG) injection 9 Units  9 Units Subcutaneous TID  Marc Pearl MD   9 Units at 04/15/24 1250    nystatin (MYCOSTATIN) powder   Topical BID Jossy Hartley MD   Given at 04/15/24 0827    insulin glargine (LANTUS) injection 5 Units  5 Units Subcutaneous Nightly Marc Pearl MD   5  Units at 04/14/24 2021    metoPROLOL succinate (TOPROL-XL) ER tablet 12.5 mg  12.5 mg Oral Daily Marc Pearl MD   12.5 mg at 04/15/24 0819    allopurinol (ZYLOPRIM) tablet 100 mg  100 mg Oral Daily Marc Pearl MD   100 mg at 04/15/24 0819    fluticasone-vilanterol (BREO ELLIPTA) 100-25 MCG/ACT inhaler 1 puff  1 puff Inhalation Daily Resp Marc Pearl MD   1 puff at 04/15/24 0846    potassium CHLORIDE (KLOR-CON M) garland ER tablet 20 mEq  20 mEq Oral Daily Marc Pearl MD   20 mEq at 04/15/24 0818    rosuvastatin (CRESTOR) tablet 20 mg  20 mg Oral Daily Marc Pearl MD   20 mg at 04/15/24 0817    sodium chloride 0.9 % flush bag 25 mL  25 mL Intravenous PRN Travon Mahan        sodium chloride 0.9 % injection 2 mL  2 mL Intracatheter 2 times per day Travon Mahan   2 mL at 04/14/24 2023    sodium chloride (NORMAL SALINE) 0.9 % bolus 500 mL  500 mL Intravenous PRN Marc Pearl MD        ondansetron (ZOFRAN ODT) disintegrating tablet 4 mg  4 mg Oral Q12H PRN Travon Mahan        Or    ondansetron (ZOFRAN) injection 4 mg  4 mg Intravenous Q12H PRN Travon Mahan        melatonin tablet 6 mg  6 mg Oral Nightly PRN Travon Mahan   6 mg at 04/14/24 2353    polyethylene glycol (MIRALAX) packet 17 g  17 g Oral Daily PRN Travon Mahan        Potassium Standard Replacement Protocol (Levels 3.5 and lower)   Does not apply See Admin Instructions Travon Mahan        Magnesium Standard Replacement Protocol   Does not apply See Admin Instructions Travon Mahan        Phosphorus Standard Replacement Protocol   Does not apply See Admin Instructions Travon Mahan        Potassium Replacement (Levels 3.6 - 4)   Does not apply See Admin Instructions Travon Mahan        dextrose 50 % injection 25 g  25 g Intravenous PRN Travon Mahan        dextrose 50 % injection 12.5 g  12.5 g Intravenous PRN Travon Mahan        glucagon (GLUCAGEN) injection 1 mg  1 mg Intramuscular PRN Travon Mahan        dextrose (GLUTOSE) 40 % gel 15 g  15 g Oral PRN Travon Mahan         dextrose (GLUTOSE) 40 % gel 30 g  30 g Oral PRN Travon Mahan        insulin lispro (ADMELOG,HumaLOG) - Correction Dose   Subcutaneous TID WC Travon Mahan   1 Units at 04/15/24 1250    insulin lispro (ADMELOG,HumaLOG) - Correction Dose   Subcutaneous Nightly Joyce Pizarro MD   2 Units at 04/10/24 2057    balsalazide (COLAZAL) capsule 2,250 mg  2,250 mg Oral Daily Joyce Pizarro MD   2,250 mg at 04/15/24 0819    [Held by provider] losartan (COZAAR) tablet 25 mg  25 mg Oral Daily Joyce Pizarro MD   25 mg at 03/28/24 0810    ruxolitinib (JAKAFI) tablet 15 mg  15 mg Oral Q24H Marc Pearl MD   15 mg at 04/14/24 1702       OBJECTIVE:    Vital Last Value 24 Hour Range    Temperature 98.4 °F (36.9 °C) (04/15/24 1423) Temp  Min: 96.4 °F (35.8 °C)  Max: 98.4 °F (36.9 °C)    Pulse 78 (04/15/24 1516) Pulse  Min: 69  Max: 91    Respiratory 19 (04/15/24 1516) Resp  Min: 16  Max: 19    Non-Invasive    Blood Pressure 104/67 (04/15/24 1423) BP  Min: 104/67  Max: 129/81    Pulse Oximetry 100 % (04/15/24 1505) SpO2  Min: 95 %  Max: 100 %    Arterial    Blood Pressure   No data recorded    No intake or output data in the 24 hours ending 04/15/24 1536    Wt Readings from Last 3 Encounters:   04/14/24 87.8 kg (193 lb 8 oz)   12/22/23 93.9 kg (207 lb)   12/08/23 95.3 kg (210 lb)        Tele: NSR 70's    PHYSICAL EXAMINATION:    Physical Exam  Vitals and nursing note reviewed.   Constitutional:       General: He is not in acute distress.  HENT:      Head: Normocephalic.      Mouth/Throat:      Comments: cough     Neck: Normal range of motion.   Cardiovascular:      Rate and Rhythm: Normal rate and regular rhythm.   Neurological:      Mental Status: He is alert.           Labs:    CBC:   Recent Labs   Lab 04/14/24  0633 04/13/24  0624 04/12/24  0404 04/11/24  0601   WBC 6.4 5.6 7.6 8.7   RBC 3.60* 3.53* 3.67* 3.67*   HGB 10.8* 10.6* 11.0* 11.1*   HCT 34.4* 33.4* 35.0* 34.6*   MCV 95.6 94.6 95.4 94.3   MCHC 31.4* 31.7* 31.4* 32.1   RDW-CV 16.8*  17.0* 16.8* 16.8*    267 288 305   Lymphocytes, Percent 11 12 9 9       CMP:  Recent Labs   Lab 24  0633 24  0624 24  0404   SODIUM 138 134* 136   POTASSIUM 3.8 3.8 3.8   CHLORIDE 97 96* 99   CO2 30 25 24   BUN 44* 76* 53*   CREATININE 3.60* 4.39* 3.52*   GLUCOSE 121* 179* 142*   ALBUMIN 3.4* 3.2* 3.3*   AST 31 26 36   GPT 31 23 27   ALKPT 114 111 127*   BILIRUBIN 0.4 0.4 0.6   MG 1.7 1.8 2.0       COAGULATION STUDIES: No results found    TSH:   Lab Results   Component Value Date    TSH 1.996 2023       HbA1c: Last Lab A1C:  Hemoglobin A1C (%)   Date Value   2024 8.0 (H)       Last Point of Care A1C:  No results found for: \"AQHQJUFD3I\", \"5GLYH\"    LIPID PANEL: No results found    NT-PRONBP:   Recent Labs   Lab 24  0611 23  0932 23  0946 23  0455   NT-proBNP 11,860* 7,540* 10,799* 16,248*       Troponin: No results found    DIAGNOSTIC STUDIES:  Cath/PV Case    Prox LAD lesion with 30% stenosis.    Mid RCA lesion with 30% stenosis.    Mid Cx lesion with 20% stenosis.    1st Diag lesion with 20% stenosis.    Conclusions:  Normal LVEDP  Non-obstructive coronary artery disease    Recommendations:  1. Remove radial band per protocol  2. Medical management of CAD  3. Further plans per referring physician    TRANSTHORACIC ECHO (TTE) LIMITED W/ W/O IMAGING AGENT  *[facility]*  [address] [place] [address]  Phone [phone]  Fax [phone]  Limited Transthoracic Echocardiogram (TTE)    Patient: Moo Mcrae     Study Date/Time:       2024 10:27AM  MRN:     [id number]             FIN#:                  [id number]  :     1955          Ht/Wt:                 165cm 87.3kg  Age:     69                  BSA/BMI:               2.03m^2 32.1kg/m^2  Gender:  M                   Baseline BP:           114 / 77  Ordering Physician:       Jorge A Yi     Referring Physician:      Jorge A Yi      Attending Physician:      Joyce Pizarro  Performing Physician:     Iker Dangelo MD  Diagnostic Physician:     Iker Dangelo MD  Sonographer:              Dahiana Drummonderez     ------------------------------------------------------------------------------  INDICATIONS:   Mitral regurgitation. Evaluation of the MR severity post  diuresis and dialysis.    ------------------------------------------------------------------------------  STUDY CONCLUSIONS  SUMMARY:    1. Left ventricle: The cavity size is mildly dilated. Wall thickness is mildly     increased. There is concentric hypertrophy. Systolic function is severely     reduced. The ejection fraction was measured by visual estimation.     Dyskinesis of the inferior wall. The ejection fraction is 25%.  2. Mitral valve: There is at least mild regurgitation with eccentric jets.     Consider transesophageal echocardiogram or magnetic resonance imaging for     further quantification.  RECOMMENDATIONS:    1. If clinically warranted, consider cardiac magnetic resonance scan.  2. If clinically warranted, consider transesophageal echocardiography.    ------------------------------------------------------------------------------  STUDY DATA:  Comparison is made to the study of 04/02/2024.  Procedure:  A  transthoracic echocardiogram was performed. Image quality was good.  Limited  2D, limited spectral Doppler, and color Doppler.  Study status:  Routine.  Study completion:  There were no complications.    FINDINGS    LEFT VENTRICLE:  Not well visualized. The cavity size is mildly dilated. Wall  thickness is mildly increased. There is concentric hypertrophy. Systolic  function is severely reduced. There is moderate diffuse hypokinesis.  Dyskinesis of the inferior wall.    The ejection fraction was measured by  visual estimation. The ejection fraction is 25%. Diastolic dysfunction present  but unable to assess severity.    MITRAL VALVE:  Not well visualized. The annulus is mildly  calcified. The  leaflets are mildly thickened. There is at least mild regurgitation with  eccentric jets. Consider transesophageal echocardiogram or magnetic resonance  imaging for further quantification.    ------------------------------------------------------------------------------  Measurements     Left ventricle     Value       Ref     04/02/2024  Mitral valve continued  Value       Ref  04/02/2024   EF             (L) 25    %     52 - 72 20          Peak LV-LA grad S       102   mm Hg ---- ----------                                                      Max MR v                4.81  m/sec ---- 5.83   Mitral valve       Value       Ref     04/02/2024  Peak LV-LA grad S       93    mm Hg ---- 136   MR peak v          5.05  m/sec ------- ----------  Regurg VTI              174.0 cm    ---- 169.9  Legend:  (L)  and  (H)  mariela values outside specified reference range.    (N)  marks values inside specified reference range.    Prepared and electronically signed by  Iker Dangelo MD  04/12/2024 12:18     DIAGNOSTIC STUDIES:    Encounter Date: 03/26/24   Electrocardiogram 12-Lead   Result Value    Ventricular Rate EKG/Min (BPM) 72    Atrial Rate (BPM) 72    CA-Interval (MSEC) 216    QRS-Interval (MSEC) 198    QT-Interval (MSEC) 554    QTc 606    P Axis (Degrees) 38    R Axis (Degrees) -92    T Axis (Degrees) 74    REPORT TEXT      Sinus rhythm  with 1st degree AV block  Right bundle branch block  Septal infarct  (cited on or before  29-MAR-2024)  Abnormal ECG  When compared with ECG of  29-MAR-2024 12:28,  premature ventricular complexes  are no longer  present  Questionable change in initial forces of  Anteroseptal leads  QT has lengthened  Confirmed by JENNA BARR MD ([id number]) on 4/11/2024 8:32:55 AM         CATH TRANSTHORACIC ECHO (TTE) LIMITED W/ W/O IMAGING AGENT    Result Date: 4/12/2024  *[facility]* [address] [place] [address] Phone [phone] Fax [phone]  Limited Transthoracic Echocardiogram (TTE) Patient: Moo Mcrae     Study Date/Time:       2024 10:27AM MRN:     [id number]             FIN#:                  [id number] :     1955          Ht/Wt:                 165cm 87.3kg Age:     69                  BSA/BMI:               2.03m^2 32.1kg/m^2 Gender:  M                   Baseline BP:           114 / 77 Ordering Physician:       Jorge A Yi  Referring Physician:      Jorge A Yi  Attending Physician:      Joyce Pizarro Performing Physician:     Iker Dangelo MD Diagnostic Physician:     Iker Dangelo MD Sonographer:              Dahiana Almodovar  ------------------------------------------------------------------------------ INDICATIONS:   Mitral regurgitation. Evaluation of the MR severity post diuresis and dialysis. ------------------------------------------------------------------------------ STUDY CONCLUSIONS SUMMARY: 1. Left ventricle: The cavity size is mildly dilated. Wall thickness is mildly    increased. There is concentric hypertrophy. Systolic function is severely    reduced. The ejection fraction was measured by visual estimation.    Dyskinesis of the inferior wall. The ejection fraction is 25%. 2. Mitral valve: There is at least mild regurgitation with eccentric jets.    Consider transesophageal echocardiogram or magnetic resonance imaging for    further quantification. RECOMMENDATIONS: 1. If clinically warranted, consider cardiac magnetic resonance scan. 2. If clinically warranted, consider transesophageal echocardiography. ------------------------------------------------------------------------------ STUDY DATA:  Comparison is made to the study of 2024.  Procedure:  A transthoracic echocardiogram was performed. Image quality was good.  Limited 2D, limited spectral Doppler, and color Doppler.  Study status:  Routine. Study completion:  There were no complications. FINDINGS LEFT VENTRICLE:  Not well visualized. The cavity  size is mildly dilated. Wall thickness is mildly increased. There is concentric hypertrophy. Systolic function is severely reduced. There is moderate diffuse hypokinesis. Dyskinesis of the inferior wall.    The ejection fraction was measured by visual estimation. The ejection fraction is 25%. Diastolic dysfunction present but unable to assess severity. MITRAL VALVE:  Not well visualized. The annulus is mildly calcified. The leaflets are mildly thickened. There is at least mild regurgitation with eccentric jets. Consider transesophageal echocardiogram or magnetic resonance imaging for further quantification. ------------------------------------------------------------------------------ Measurements  Left ventricle     Value       Ref     04/02/2024  Mitral valve continued  Value       Ref  04/02/2024  EF             (L) 25    %     52 - 72 20          Peak LV-LA grad S       102   mm Hg ---- ----------                                                     Max MR v                4.81  m/sec ---- 5.83  Mitral valve       Value       Ref     04/02/2024  Peak LV-LA grad S       93    mm Hg ---- 136  MR peak v          5.05  m/sec ------- ----------  Regurg VTI              174.0 cm    ---- 169.9 Legend: (L)  and  (H)  mariela values outside specified reference range. (N)  marks values inside specified reference range. Prepared and electronically signed by Iker Dangelo MD 04/12/2024 12:18    CATH Cath/PV Case    Result Date: 4/10/2024    Prox LAD lesion with 30% stenosis.   Mid RCA lesion with 30% stenosis.   Mid Cx lesion with 20% stenosis.   1st Diag lesion with 20% stenosis. Conclusions: Normal LVEDP Non-obstructive coronary artery disease Recommendations: 1. Remove radial band per protocol 2. Medical management of CAD 3. Further plans per referring physician      CATH IR PERMANENT DIALYSIS CATHETER INSERTION AGE 4 OR OLDER    Result Date: 4/9/2024  CLINICAL HISTORY:  69 years-old Male with nonischemic cardiomyopathy,  heart failure with reduced ejection fraction, and acute on chronic kidney disease needs long-term central venous access for hemodialysis. PROCEDURE PERFORMED:  Tunneled Hemodialysis Catheter Placement INTERVENTIONALIST RADIOLOGIST: STAFF:  Dr. Cunningham RESIDENT:  Dr. Quevedo CONSENT:  The risks, benefits and alternatives to the procedure were explained to the patient/representative, who understood and agreed. All questions were answered. Informed written consent was obtained. SEDATION: Moderate conscious sedation was provided throughout the procedure by interventional radiology nursing personnel using Fentanyl and Versed administered intravenously.  The nurse (independent trained observer) assisted in continuously monitoring the patient's level of consciousness and physiologic status throughout the entire procedure.  I (attending physician) was present throughout the procedure and supervised/directed the sedation administered to the patient. Total intra-service (face to face) time: 13 minute(s) ANTIBIOTICS:  1 g of Ancef was intravenously administered within 1 hour of procedure. PROCEDURE/FINDINGS: All elements of maximal sterile barrier technique were followed including cap, mask, sterile gown, large sterile sheet, hand hygiene, and 2% chlorhexidine for cutaneous antisepsis (or acceptable alternative antiseptic such as an iodophor, tincture or iodine, or 70% alcohol). Using ultrasound guidance the RIGHT internal jugular vein was punctured with a micropuncture needle, after infiltration of the skin and deep tissues with local anesthetic.  Ultrasound image show the tip of the needle is in a patent and compressible RIGHT internal jugular vein. A 19-cm tip to cuff length, 14.5-[place] dual lumen GlidePath hemodialysis catheter was inserted under fluoroscopy.  The catheter was placed through a subcutaneous tunnel requiring a second incision. The incision at the base of the neck was closed with Dermabond and  Steri-Strips.  The catheter was secured at the skin exit site with 2-0 Ethilon suture.  There is free aspiration of blood from all ports of the catheter.  The ports of the catheter were locked with heparin (concentration 1000 units/cc).  A sterile dressing was then applied. Fluoroscopic image shows the tip of the catheter is at the cavoatrial junction. The patient tolerated the procedure well with no immediate complications. This procedure was performed using ultrasound and fluoroscopy.  Permanent ultrasound images were stored in PACS. Total Fluoroscopic Time: 0.3 min Total Dose: 2 mGy Dr. Cunningham was present for the entire procedure and supervised the resident during all key components of the procedure.  I personally dictated the final report.     Tunneled hemodialysis catheter placement as discussed above. Electronically Signed by: MITCHEL CUNNINGHAM M.D. Signed on: 4/9/2024 10:41 AM Workstation ID: [id number]    CATH XR CHEST AP OR PA    Result Date: 4/7/2024  EXAM: XR CHEST AP OR PA CLINICAL INDICATION: Cough, pneumonia COMPARISON: Chest x-ray 3/26/2024.     FINDINGS AND IMPRESSION: Left chest wall defibrillator with intact lead terminating over the right ventricle Overlying chin obscures the apices. No focal consolidation, pleural effusion, or detectable pneumothorax. Electronically Signed by: RACHAEL TOVAR M.D. Signed on: 4/7/2024 11:02 AM Workstation ID: [id number]    CATH Cath/PV Case    Result Date: 4/6/2024  Conclusions: Compensated hemodynamics Euvolemic Recommendations: 1. Remove right internal jugular sheath 2. Stop Bumex drip 3. Recommendations provided to Dr. Iker Dangelo    CATH TRANSESOPHAGEAL ECHO (MAGALIE) W/ W/O IMAGING AGENT    *[facility]* [address] [place] [address] Phone [phone] Fax [phone] Transesophageal Echocardiogram (MAGALIE) Patient: Moo Mcrae     Study Date/Time:        Apr 2 2024 2:23PM MRN:     [id number]              FIN#:                   [id number] :     1955          Ht/Wt:                  165cm 90.3kg Age:     69                  BSA/BMI:                2.07m^2 33.2kg/m^2 Gender:  M                   Baseline BP:            117 / 62 Ordering Physician:   Joyce Pizarro  Attending Physician:  Jossy Hartley Performing Physician: Evan Fields MD Diagnostic Physician: Evan Fields MD Sonographer:          CARMELO Tyler  Fellow:               Gavin Álvarez  ------------------------------------------------------------------------------ INDICATIONS:  Mitral valve disease. Post-procedure diagnosis:   Severe mitral regurgitation. ------------------------------------------------------------------------------ STUDY CONCLUSIONS SUMMARY: 1. Left ventricle: The cavity size is mildly dilated. Wall thickness is    normal. Systolic function is severely reduced. The ejection fraction was    measured by visual estimation. The ejection fraction is 20%. 2. Aortic valve: There is mild regurgitation. 3. Mitral valve: Posterior leaflet mobility is severely restricted. There is    torrential regurgitation, with multiple jets, originating from the central    commissure and anterolateral commissure, directed centrally and    eccentrically. Left atrium height 4.5 cm, Posterior leaflet lenght 0.97 cm    The mitral valve area is 3.4cm^2. The mean diastolic gradient is 3mm Hg. 4. Right ventricle: The cavity size is normal. Systolic function is reduced.    Pacemaker lead noted in right ventricle. 5. Right atrium: Pacemaker lead noted in right atrium. 6. Atrial septum: Septal thickness is is increased, consistent with lipomatous    hypertrophy. Agitated saline contrast study and color doppler shows no    obvious shunt. IMPRESSIONS:  This is due to posterior leaflet restriction. There is torrential mitral regurgitation. Consider TRACEY. Gradient and MVA is borderline but a consideration for clip in the A2-P2 region may  significantly reduce mitral regurgitation. RECOMMENDATIONS:  Structural cardiology consultation is advised. ------------------------------------------------------------------------------ STUDY DATA:   Procedure:  The patient arrived at the laboratory in a fasting state. Surface ECG leads, blood pressure measurements, and pulse oximetric signals were monitored.  Time out performed. Sedation. Moderate sedation was administered for pain control by anesthesiology. Sedation commenced at 04/02/2024 02:37 PM and concluded at 04/02/2024 02:54 PM. A transesophageal echocardiogram was performed for preoperative diagnosis. A transesophageal probe was inserted by the cardiology fellow under direct supervision of the attending cardiologist. Time of probe insertion was 02:37. Image quality was good. Intravenous contrast (agitated saline) was administered to evaluate for shunting. The transesophageal probe was removed. Time of probe removal was 02:54. Specimens removed: N/A. Estimated blood loss: 0 cc. Grafts or implants placed: N/A.  Complete 2D, 3D, complete spectral Doppler, and color Doppler. Study status:  Routine.  Consent:  The risks, benefits, and alternatives to the procedure were explained. Consent was obtained.  Study completion:  The patient tolerated the procedure well. There were no complications. FINDINGS LEFT VENTRICLE:  Well visualized. The cavity size is mildly dilated. Wall thickness is normal. There is no evidence of hypertrophy. Systolic function is severely reduced.    The ejection fraction was measured by visual estimation. The ejection fraction is 20%. AORTIC VALVE:  Well visualized. The annulus is normal. The valve is trileaflet. The leaflets are mildly thickened. Cusp separation is normal. Mobility is not restricted.  There is no evidence of a vegetation. There is mild regurgitation. The mean systolic gradient is 5mm Hg. The peak systolic gradient is 8mm Hg. The LVOT to aortic valve VTI ratio is 0.47. The  valve area is 1.9cm^2. The valve area index is 0.94cm^2/m^2. AORTA: Aortic root: The root is normal-sized. Descending aorta: The vessel is normal-sized. There are minor luminal irregularities. MITRAL VALVE:  3D imaging and post-processing assessment performed. Well visualized. The annulus is mildly calcified. The leaflets are normal thickness. Leaflet separation is reduced.  Posterior leaflet mobility is severely restricted. No evidence for prolapse. There is nothing to suggest chordal rupture or a flail leaflet.  There is no evidence of vegetation. Inflow velocity is within the normal range. There is no evidence for stenosis. There is torrential regurgitation, with multiple jets, originating from the central commissure and anterolateral commissure, directed centrally and eccentrically. Left atrium height 4.5 cm, Posterior leaflet lenght 0.97 cm Posterior leaflet lenght 0.97 cm The mean diastolic gradient is 3mm Hg. Pre mitral clip:  The mitral valve area is 3.4cm^2. The findings are consistent with trivial mitral stenosis. There is severe mitral regurgitation. ATRIAL SEPTUM:  Well visualized. 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  Agitated saline contrast study and color doppler shows no obvious shunt. LEFT ATRIUM:  Well visualized. The atrium is normal in size.  There is no evidence of a thrombus in the atrial cavity or appendage. No spontaneous echo contrast is observed. Appendage:  The appendage is well visualized and morphologically a left appendage. The appendage has a \"windsock\" morphology. Emptying velocity is normal. PULMONARY VEINS:   There is systolic blunting. Left upper pulmonary vein:    The vein is normal-sized. Right upper pulmonary vein:    The vein is normal-sized. Left upper pulmonary vein: The vein is normal-sized. Right upper pulmonary vein: The vein is normal-sized. RIGHT VENTRICLE:  The cavity size is normal. The moderator band is prominent. Systolic  function is reduced. Pacemaker lead noted in right ventricle. PULMONIC VALVE:   Well visualized. There is mild regurgitation. TRICUSPID VALVE:  Well visualized. The valve is structurally normal. Leaflet separation is normal. There is mild-moderate regurgitation. PULMONARY ARTERIES: The main pulmonary artery is normal-sized. RIGHT ATRIUM:  Well visualized. The atrium is mildly dilated. Pacemaker lead noted in right atrium. PERICARDIUM:  The pericardium is normal in appearance.  There is no pericardial effusion. ------------------------------------------------------------------------------ Measurements  Left ventricle     Value        Ref     03/27/2024  Aortic valve continued  Value          Ref  03/27/2024  EF             (L) 20    %      52 - 72 20          LVOT/AV, VTI ratio      0.47           ---- ----------                                                      EDWARD, VTI                1.9   cm^2     ---- ----------  LVOT               Value        Ref     03/27/2024  EDWARD/bsa, VTI            0.94  cm^2/m^2 ---- ----------  Diam, S            2.3   cm     ------- 2.0  Area               4.1   cm^2   ------- 3.1         Mitral valve            Value          Ref  03/27/2024  SV                 50    ml     ------- ----------  Steffanie A-P diam            2.4   cm       ---- ----------  SV/bsa             24    ml/m^2 ------- ----------  Steffanie M-L diam            2.2   cm       ---- ----------                                                      Mean grad, D            3     mm Hg    ---- 3  Aortic valve       Value        Ref     03/27/2024  Steffanie VTI                 8.1   cm       ---- ----------  Peak v, S          1.4   m/sec  ------- 1.7         Transvalve flow         33.5  ml       ---- ----------  Mean v, S          1.03  m/sec  ------- ----------  Max MR v                5.83  m/sec    ---- 4.76  Mean grad, S       5     mm Hg  ------- ----------  Peak LV-LA grad S       136   mm Hg    ---- 91  Peak grad, S       8      mm Hg  ------- 12          Regurg VTI              169.9 cm       ---- 153.0 Legend: (L)  and  (H)  mariela values outside specified reference range. (N)  marks values inside specified reference range. Prepared and electronically signed by Mariela Fields MD 2024 16:55 Prior Signatures: Preliminary Reviewed by Gavin Álvarez - 2024 4:30:22 PM    CATH Stress Test with Myocardial Perfusion    *[facility]* [address] [place] [address] Phone [phone] Myocardial Perfusion Imaging Regadenoson (Lexiscan) Rest/Stress Patient: Moo Mcrae     Study Date/Time:       Mar 29 2024 12:24PM MRN:     [id number]             FIN#:                  [id number] :     1955          Ht/Wt:                 165cm 95kg Age:     69                  BSA/BMI:               2.13m^2 34.9kg/m^2 Gender:  M                   Baseline BP:           127 / 58 Ordering Physician:             Carmen Gomez Referring Physician:            Carmen Gomez  Attending Physician:            Joyce Pizarro Diagnostic Physician:           Iker Dangelo MD Technologist:                   Iraj Cummings  ------------------------------------------------------------------------------ ------------------------------------------------------------------------------ Indications:   Chest pain. ------------------------------------------------------------------------------ Study Conclusions Summary: 1. Myocardial perfusion imaging: The left ventricle is dilated. There is no    transient ischemic dilation of the left ventricle during stress. The TID    ratio is 1.08. There is a large, severe defect involving the mid and apical    anterior, mid anteroseptal, mid and apical inferior, apical lateral, apical    septal, and apical wall(s). SSS=29, SRS=27. After Attenuation correction:    SSS=12, SRS=20. 2. Gated SPECT: There is global hypokinesis. 3. Rest: The calculated EF is 17%. 4. Stress: The calculated EF  is 21%. 5. Stress ECG conclusions: Ventricular bigeminy. The stress ECG is normal. 6. Baseline ECG: Normal sinus rhythm. First degree atrioventricular block.    RBBB and LAFB. Cannot exclude prior anterior infarct. Ventricular bigeminy Impressions: 1. Low ejection fraction of 20%, and dilated left ventricle. Potential    contribution cardiomyopathy and ventricular bigeminy. 2. Possible anterior infarct without ischemia. Clinical correlation advised. 3. Team notified. ------------------------------------------------------------------------------ Study data:   Nuclear components:    SPECT; rest/stress imaging.  Study status:  Routine.  Location:  Stress laboratory.  Patient status:  Inpatient. Objective:  Diagnostic study with intervention if indicated.  Consent:  The risks, benefits, and alternatives to the procedure were explained to the patient.  Study completion:  The patient tolerated the procedure well. There were no complications. ------------------------------------------------------------------------------ Procedure data:  The patient arrived at the laboratory. A baseline ECG was recorded. Intravenous access was obtained. Surface ECG leads and manual cuff blood pressure measurements were monitored.  Regadenoson (Lexiscan) stress test was performed. Regadenoson (Lexiscan) was administered by intravenous bolus, followed by a 5ml saline flush. The total dose was 0.4mgover 10.00second(s). ------------------------------------------------------------------------------ Isotope administration: +-------------+------------------+------------------+ !Stage        !Rest              !Stress            ! +-------------+------------------+------------------+ !Agent        !Tc-99m tetrofosmin!Tc-99m tetrofosmin! +-------------+------------------+------------------+ !Injected dose!11mCi             !29mCi             ! +-------------+------------------+------------------+ !Image method !Gated SPECT       !Gated SPECT       !  +-------------+------------------+------------------+ ------------------------------------------------------------------------------ Baseline ECG:  Normal sinus rhythm. First degree atrioventricular block. ------------------------------------------------------------------------------ Cardiac stress table: +-----------------+-------------+--+-----------+------------+----------------+ !Stage            !Time into    !HR!BP         !Symptoms    !Comments        ! !                 !test         !  !           !            !                ! +-----------------+-------------+--+-----------+------------+----------------+ !PREINFSN SUPINE; !00:00        !77!127/58 (81)!------------!97% SPO2 ON ROOM! !14 min 32 sec    !             !  !           !            !AIR. No         ! !                 !             !  !           !            !symptoms. TIME  ! !                 !             !  !           !            !OUT DONE.       ! +-----------------+-------------+--+-----------+------------+----------------+ !INFUSION         !14:33        !79!127/57 (80)!------------!Lexiscan        ! !INFUSION; 1 min  !             !  !           !            !Injected.       ! !                 !             !  !           !            !Isotope         ! !                 !             !  !           !            !Injected.       ! +-----------------+-------------+--+-----------+------------+----------------+ !POSTINFSN POST1; !15:40        !88!127/57 (80)!------------!98% SPO2.       ! !1 min            !             !  !           !            !                ! +-----------------+-------------+--+-----------+------------+----------------+ !POSTINFSN POST2; !16:41        !89!124 /49    !------------!----------------! !1 min            !             !  !(74)       !            !                ! +-----------------+-------------+--+-----------+------------+----------------+ !POSTINFSN POST3; !17:41        !89!124/49  (74)!------------!----------------! !1 min            !             !  !           !            !                ! +-----------------+-------------+--+-----------+------------+----------------+ !POSTINFSN POST4; !18:41        !90!131/50 (77)!------------!----------------! !1 min            !             !  !           !            !                ! +-----------------+-------------+--+-----------+------------+----------------+ !POSTINFSN POST5; !19:41        !88!131/50 (77)!------------!----------------! !1 min            !             !  !           !            !                ! +-----------------+-------------+--+-----------+------------+----------------+ !POSTINFSN POST6; !20:41        !86!133/46 (75)!------------!No symptoms.    ! !4 min 32 sec     !             !  !           !            !                ! +-----------------+-------------+--+-----------+------------+----------------+ !Baseline         !-------------!--!-----------!No symptoms.!----------------! +-----------------+-------------+--+-----------+------------+----------------+ Stress results:   Maximal heart rate during stress was 91bpm (60% of maximal predicted heart rate). The maximal predicted heart rate was 150bpm. The target heart rate was 128bpm. The target heart rate was not achieved. There is a normal resting blood pressure with an appropriate response to stress. The rate-pressure product for the peak heart rate and blood pressure was 13436ph Hg/min.  Stress testing did not produce any symptoms suggestive of coronary artery disease. ECG:  Normal sinus rhythm. First degree atrioventricular block. Stress ECG:   Ventricular bigeminy.  The stress ECG is normal. ------------------------------------------------------------------------------ Myocardial perfusion imaging:   The left ventricle is dilated. There is no transient ischemic dilation of the left ventricle during stress. The TID ratio is 1.08. There is a large, severe defect involving  the mid and apical anterior, mid anteroseptal, mid and apical inferior, apical lateral, apical septal, and apical wall(s). SSS=29, SRS=27. After Attenuation correction: SSS=12, SRS=20. Gated SPECT:  There is global hypokinesis. Results table: +-----------------+-----+-------+ !Stage description!Rest:!Stress:! +-----------------+-----+-------+ !EF (%)           !17%  !21%    ! +-----------------+-----+-------+ Prepared and electronically signed by: Iker Dangelo MD 2024 13:52    CATH US KIDNEY BILATERAL    Result Date: 3/29/2024  EXAM: US KIDNEY BILATERAL CLINICAL INDICATION: MAXIMUS on CKD COMPARISON: Ultrasound kidneys 11/10/2023 TECHNIQUE/FINDINGS: Using a curved array transducer, sagittal and transverse images were obtained through the kidneys and urinary bladder. Kidneys are normal in size with diffusely increased echogenicity with the right kidney measuring 10.5 cm and the left kidney measuring 10.9 cm in length. 1.4 cm and smaller simple right kidney cysts. Few 1.8 cm and smaller simple left renal cysts. 3 mm echogenicity in the mid left kidney without twinkle artifact or posterior acoustic shadowing may represent a nonobstructing stone. No hydronephrosis. Small amount of fluid along the upper right kidney. Urinary bladder is not visualized, likely completely decompressed.     Echogenic kidneys compatible with renal parenchymal disease. Simple bilateral renal cysts. Small amount of right perinephric free fluid. Electronically Signed by: RACHAEL TOVAR M.D. Signed on: 3/29/2024 11:05 AM Workstation ID: [id number]    CATH TRANSTHORACIC ECHO (TTE) COMPLETE W/ W/O IMAGING AGENT    *[facility]* [address] [place] [address] Phone [phone] Fax [phone] Transthoracic Echocardiogram (TTE) Patient: Moo Mcrae     Study Date/Time:       Mar 27 2024 11:42AM MRN:     [id number]             FIN#:                  [id number] :     1955           Ht/Wt:                 165cm 94.4kg Age:     69                  BSA/BMI:               2.12m^2 34.7kg/m^2 Gender:  M                   Baseline BP:           123 / 65 Ordering Physician:    Patrick Mendoza  Attending Physician:   Joyce Pizarro Performing Physician:  Evan Fields MD Diagnostic Physician:  Evan Fields MD Sonographer:           CARMELO Tyler  ------------------------------------------------------------------------------ INDICATIONS:  Heart failure. ------------------------------------------------------------------------------ STUDY CONCLUSIONS SUMMARY: 1. Left ventricle: The cavity size is moderately to severely dilated. Wall    thickness is normal. Systolic function is severely reduced. The ejection    fraction was measured by 3D assessment. There is severe diffuse hypokinesis    with regional variations. The tissue Doppler parameters are abnormal.    Diastolic dysfunction present but unable to assess severity. The ejection    fraction is 20%. 2. Mitral valve: The annulus is mildly calcified. The leaflets are mildly    thickened and mildly calcified. Posterior leaflet mobility is mildly    restricted. Inflow velocity is minimally increased. The findings are    consistent with trivial stenosis. There is severe regurgitation, with    multiple jets, directed eccentrically, anteriorly, and posteriorly. The    mean diastolic gradient is 3mm Hg. 3. Left atrium: The atrium is mildly to moderately dilated. 4. Right ventricle: The cavity size is at the upper limits of normal. Systolic    function is mildly reduced by visual assessment. Systolic pressure is    moderately increased. The estimated peak pressure is 55mm Hg. Pacemaker    lead noted in right ventricle. 5. Right atrium: Pacemaker lead noted in right atrium. 6. Tricuspid valve: There is moderate regurgitation, with multiple jets    directed centrally and eccentrically. 7. Inferior vena cava: The IVC is dilated. Respirophasic diameter changes  are    blunted (< 50%). IMPRESSIONS:   The study shows worsening since the study of 11/09/2023. There is severe mitral regurgitation. Increased pulmonary pressures. Transesophageal echocardiogram and cards consult is recommended. RECOMMENDATIONS: 1. If clinically warranted, consider transesophageal echocardiography. 2. Primary MD notified. ------------------------------------------------------------------------------ STUDY DATA:   Procedure:  A transthoracic echocardiogram was performed. Image quality was adequate. The study was technically limited due to body habitus. Intravenous contrast (Definity 2ml) was administered to opacify the left ventricle.  M-mode, complete 2D, 3D, complete spectral Doppler, color Doppler, and intravenous contrast injection.  Study status:  Routine.  Study completion:  There were no complications. FINDINGS BASELINE ECG:   Bradycardia. LEFT VENTRICLE:  3D imaging and post-processing assessment performed. The cavity size is moderately to severely dilated. Wall thickness is normal. There is no evidence of hypertrophy. Systolic function is severely reduced. There is severe diffuse hypokinesis with regional variations. Unable to accurately assess left ventricular diastolic function parameters due to technical limitations.  There is no evidence of a thrombus revealed by acoustic contrast opacification.    The ejection fraction was measured by 3D assessment. The ejection fraction is 20%. The tissue Doppler parameters are abnormal. Diastolic dysfunction present but unable to assess severity. AORTIC VALVE:  The annulus is mildly calcified. The valve is probably trileaflet. The leaflets are mildly thickened. Cusp separation is normal. Velocity is within the normal range. There is no stenosis. There is trivial regurgitation. The peak systolic gradient is 12mm Hg. The ratio of LVOT to aortic valve peak velocity is 0.33. The valve area is 1.0cm^2. The valve area index is 0.48cm^2/m^2. AORTA: Aortic  root: The root is normal-sized. Ascending aorta: The vessel is normal-sized. MITRAL VALVE:  The annulus is mildly calcified. The leaflets are mildly thickened and mildly calcified. Leaflet separation is mildly reduced. Posterior leaflet mobility is mildly restricted. Inflow velocity is minimally increased. The findings are consistent with trivial stenosis. There is severe regurgitation, with multiple jets, directed eccentrically, anteriorly, and posteriorly. The mean diastolic gradient is 3mm Hg. The peak diastolic gradient is 8mm Hg. The valve area by pressure half-time is 5.1cm^2. The valve area index by pressure half-time is 2.42cm^2/m^2. The valve area (LVOT continuity) is 0.7cm^2. The valve area index (LVOT continuity) is 0.33cm^2/m^2. LEFT ATRIUM:  The atrium is mildly to moderately dilated. RIGHT VENTRICLE:  The cavity size is at the upper limits of normal. Systolic function is mildly reduced by visual assessment.  Systolic pressure is moderately increased. The estimated peak pressure is 55mm Hg. Pacemaker lead noted in right ventricle.       The RV pressure during systole is 55mm Hg. VENTRICULAR SEPTUM:   Thickness is normal. There is no diastolic flattening and no systolic flattening.  There is no evidence of a ventricular septal defect. PULMONIC VALVE:   Not well visualized. Velocity is within the normal range. There is no significant regurgitation. The peak systolic gradient is 5mm Hg. TRICUSPID VALVE:  There is moderate regurgitation, with multiple jets directed centrally and eccentrically. RIGHT ATRIUM:  The atrium is normal in size. Pacemaker lead noted in right atrium.       The estimated central venous pressure is 15mm Hg. PERICARDIUM:   There is no pericardial effusion. SYSTEMIC VEINS: Inferior vena cava: The IVC is dilated.  Respirophasic diameter changes are blunted (< 50%). ------------------------------------------------------------------------------ Measurements  Left ventricle           Value              Ref       11/09/2023  Aortic valve continued     Value          Ref       11/09/2023  SIL, LAX chord       (H) 6.8   cm          4.2 - 5.8 7.1         AR decel                   37.3  cm/s^2   --------- ----------  ESD, LAX chord       (H) 6.1   cm          2.5 - 4.0 6.3         AR PHT                     2394  ms       --------- ----------  SIL/bsa, LAX chord   (H) 3.2   cm/m^2      2.2 - 3.0 3.4         AR ED grad                 37    mm Hg    --------- ----------  ESD/bsa, LAX chord   (H) 2.9   cm/m^2      1.3 - 2.1 3.0  PW, ED, LAX          (N) 1.0   cm          0.6 - 1.0 0.9         Mitral valve               Value          Ref       11/09/2023  IVS, ED              (N) 0.9   cm          0.6 - 1.0 0.9         Mean v, D                  0.7   m/sec    --------- ----------  PW, ED               (N) 1.0   cm          0.6 - 1.0 0.9         Peak E                     1.39  m/sec    --------- ----------  IVS/PW, ED               0.9               --------- 1.07        Peak A                     0.96  m/sec    --------- ----------  EDV                  (H) 314   ml          62 - 150  356         VTI leaflet coapt          50.4  cm       --------- ----------  ESV                  (H) 227   ml          21 - 61   250         MiV/LVOT VTI               4.5            --------- ----------  EF                   (L) 20    %           52 - 72   20          Decel time                 180   ms       --------- ----------  SV                       52    ml          --------- 62          PHT                        53    ms       --------- ----------  EDV/bsa              (H) 148   ml/m^2      34 - 74   169         Mean grad, D               3     mm Hg    --------- ----------  ESV/bsa              (H) 107   ml/m^2      11 - 31   118         Peak grad, D               8     mm Hg    --------- ----------  SV/bsa                   25    ml/m^2      --------- 29          Peak E/A ratio             1.2            ---------  ----------  E', med jose, TDI     (L) 4.3   cm/sec      >=7.0     ----------  A-VTI                      50.4  cm       --------- ----------  E/e', med jose, TDI       32                --------- ----------  MVA, PHT                   5.1   cm^2     --------- ----------                                                                   MVA/bsa, PHT               2.42  cm^2/m^2 --------- ----------  LVOT                     Value             Ref       11/09/2023  MVA, LVOT cont             0.7   cm^2     --------- ----------  Diam, S                  2.0   cm          --------- 2.1         MVA/bsa, LVOT cont         0.33  cm^2/m^2 --------- ----------  Area                     3.1   cm^2        --------- 3.5         MR vol, LVOT cont          50    ml       --------- ----------  Peak iggy, S              0.56  m/sec       --------- ----------  MR peak v                  4.59  m/sec    --------- ----------  Peak grad, S             1     mm Hg       --------- ----------  Peak LV-LA grad S          84    mm Hg    --------- ----------  Qs                       1.5   L/min       --------- ----------  Max MR v                   4.76  m/sec    --------- ----------  Qs/bsa                   0.7   L/(min-m^2) --------- ----------  Peak LV-LA grad S          91    mm Hg    --------- ----------                                                                   Regurg VTI                 153.0 cm       --------- ----------  Right ventricle          Value             Ref       11/09/2023  ERO, PISA                  0.34  cm^2     --------- ----------  TAPSE, MM            (N) 1.7   cm          >=1.7     ----------  Pressure, S              55    mm Hg       --------- ----------  Pulmonic valve             Value          Ref       11/09/2023                                                                   Peak v, S                  1.2   m/sec    --------- ----------  Left atrium              Value             Ref       11/09/2023   Peak grad, S               5     mm Hg    --------- ----------  AP dim, ES           (H) 5.7   cm          3.0 - 4.0 3.9  AP dim index, ES     (H) 2.7   cm/m^2      1.5 - 2.3 1.8         Tricuspid valve            Value          Ref       11/09/2023  Area ES, A4C         (H) 25    cm^2        <=20      ----------  TR peak v              (H) 3.2   m/sec    <=2.8     ----------  Area/bsa ES, A4C         11.85 cm^2/m^2    --------- ----------  Peak RV-RA grad, S         40    mm Hg    --------- ----------  Area ES, A2C             24    cm^2        --------- ----------  Area/bsa ES, A2C         11.28 cm^2/m^2    --------- ----------  Aortic root                Value          Ref       11/09/2023  Vol, ES, 1-p A4C     (H) 88    ml          18 - 58   ----------  Root diam, ED          (L) 2.5   cm       2.8 - 4.2 2.3  Vol/bsa, ES, 1-p A4C (H) 41    ml/m^2      12 - 37   ----------  S-T junct diam, ED     (N) 2.6   cm       2.3 - 3.5 2.6  Vol, ES, 1-p A2C     (H) 77    ml          18 - 58   ----------  S-T junct diam/bsa, ED (N) 1.2   cm/m^2   1.1 - 1.9 1.2  Vol/bsa, ES, 1-p A2C (N) 36    ml/m^2      11 - 43   ----------  Vol, ES, 2-p             85    ml          --------- ----------  Ascending aorta            Value          Ref       11/09/2023  Vol/bsa, ES, 2-p     (H) 40    ml/m^2      16 - 34   ----------  AAo AP diam, ED        (N) 3.0   cm       2.2 - 3.8 3.0                                                                   AAo AP diam/bsa, ED    (N) 1.4   cm/m^2   1.1 - 1.9 1.4  Aortic valve             Value             Ref       11/09/2023  Peak v, S                1.7   m/sec       --------- ----------  Pulmonary artery           Value          Ref       11/09/2023  Peak grad, S             12    mm Hg       --------- ----------  Pressure, S                50    mm Hg    --------- ----------  LVOT/AV, Vpeak ratio     0.33              --------- ----------  EDWARD, Vmax                1.0   cm^2        ---------  ----------  Systemic veins             Value          Ref       11/09/2023  EDWARD/bsa, Vmax            0.48  cm^2/m^2    --------- ----------  Estimated CVP              15    mm Hg    --------- 3  AR ED v                  3.05  m/s         --------- ---------- Legend: (L)  and  (H)  evan values outside specified reference range. (N)  marks values inside specified reference range. Prepared and electronically signed by Evan Fields MD 03/27/2024 13:49    CATH Cardiac Device Check - Alert    Result Date: 3/27/2024  Table formatting from the original result was not included. ALERT FOR VF x 27 w/ATP on 3/25/24 starting at 8:03 pm and most recent episode on 3/26/24 at 1:29 am & shocks x3. Presenting rhythm: VS/SR 83 bpm Device type: SC ICD Sensing and pacing adequately Battery status good Lead impedances and thresholds stable. 14 NSVT & 27 VF episodes detected, available VF EGMs favor VT w/ATP x1 delivered, rates 203- 250 bpm. CorVue Impedance Monitoring WNL Device noted to be functioning within normal limits. No changes recommended. Continue with routine ICD follow up. TE- LVM informing patient of VT episodes with times and days and instructed patient to call a device nurse back with symptom status. Patient called back and stated he received 3 shocks today, 2 of them around 5 am when he was making breakfast and 1 about an hour ago when he was making lunch. Patient stated he feels okay right now, denies dizziness, lightheadedness, at the time of the shocks. Instructed patient to send a manual transmission from his monitor. Dr. Don notified via secure chat Important Message. Transmission received, patient had 23 more VF zone VT episodes with 23 ATP and 36J shocks x3 this morning from 6:37 am- 12:08 pm. Presenting EGM favors intermittent runs of VT. Patient notified of the results and instructed to go to [facility] ED. Patient continues to state he feels \"fine\" and denies any symptoms and stated he will go to [facility] ED via cab.  Instructed patient to dial 911 if develops symptoms of dizziness, lightheadedness. Patient verbalized understanding. Yearly ICD check: 3/6/25 EP follow up as needed with Dr. Don Cardiology follow up with Dr. Chaim Schultz Device RN-  Tiana HANCOCK Date Battery Charge Time R Wave RV -Threshold RV ohms HV Ohms VT VF Pacing% Rhythm ICM visit Notes 11/17/23 KATHY - >12 0.5V@0.5 540 83 - - - - - implant CMY, CHF 11/27/23 8.8-10.4 NA >12 0.5V@0.5 480 80 2   0% SR ST PACs OFF [facility]CK MC CorVue>ON 2/22/24  9.9 yrs 8.8s >12 0.5V@0.5 490 92 7 0 <1% SR  NL CC clinic   3/3/24 9.9 yrs 8.8s >12  91 1 NS 1 atp 0% ST 120s 12 dys ATP ALERT   3/21/24 9.5 yrs 8.8 >12  82 9 NS 8 atp 0 SR 92 - ALERT ATP 3/26/24 8.5 yrs 8.8s >12  73 32 NS 59ATP 3 HV 0 Intermittent VT NL ALERT MC Sent to [facility] ED      CATH XR CHEST AP OR PA    Result Date: 3/26/2024  EXAM: XR CHEST AP OR PA CLINICAL INDICATION: Shortness of breath, dizziness, chest pain COMPARISON: X-ray chest PA and lateral 2 views November 18, 2023     FINDINGS AND IMPRESSION: Prominent heart size. Mild prominence of bronchovascular and interstitial markings. Few streaky opacities at right lung base. Findings relatively unchanged from prior study. Dictated by: TERESA CASTANEDA MD Dictated on: 3/26/2024 2:36 PM IFEI M.D., have reviewed the images and report and concur with these findings interpreted by TERESA CASTANEDA MD. Electronically Signed by: FEI BENAVIDEZ M.D. Signed on: 3/26/2024 2:50 PM Workstation ID: [id number]    CATH Cardiac Device Check - Alert    Result Date: 3/23/2024  Table formatting from the original result was not included. Comments: ALERT FOR VF x 8  w/ATP Presenting rhythm: VR=92 & regular Device type: SC ICD Sensing and pacing adequately Battery status good Lead impedances and thresholds stable. 9 NSVT & 8 VF episodes detected, all 8 show VT w/ATP x1 delivered. Transthoracic impedance index does not support  volume overload. Device noted to be functioning within normal limits. No changes recommended. Continue with routine ICD follow up. Left message on voicemail, call back requested. Yearly ICD check: 3/26/25 EP follow up as needed with Dr. Don Cardiology follow up with Dr. Chaim Schultz Device RN- Lacey HANCOKC Date Battery Charge Time R Wave RV -Threshold RV ohms HV Ohms VT VF Pacing% Rhythm ICM visit Notes 11/17/23 KATHY - >12 0.5V@0.5 540 83 - - - - - implant CMY, CHF 11/27/23 8.8-10.4 NA >12 0.5V@0.5 480 80 2   0% SR ST PACs OFF WCK MC CorVue>ON 2/22/24  9.9 yrs 8.8s >12 0.5V@0.5 490 92 7 0 <1% SR  NL CC clinic   3/3/24 9.9 yrs 8.8s >12  91 1 NS 1 atp 0% ST 120s 12 dys ATP ALERT   3/21/24 9.5 yrs 8.8 >12  82 9 NS 8 atp 0 SR 92 - ALERT ATP   Interpretation: Implants   ICD  Dfbr [place] 1 Chmbr Df4 Cnct Cfa88hm 45b02nn 30cc 69gm - [id number] - Implanted   (Left) Chest  Inventory item: DFBR [place] 1 CHMBR DF4 CNCT [id number] 30CC 69GM Model/Cat number: [id number]  Serial number: [id number] : Abbott Cardiac Arrhythmias And Heart Failure  Device identifier: [id number] Device identifier type: GS1  Implant Tracking Number: [id number] Laterality: Left  Site: Chest Implanted by: Ayaan Don MD  Date of implant: 11/17/2023    GUDID Information   Request status Successful    Brand name: [place]™ Version/Model: [id number]  Company name: [employer] MRI safety info as of 11/17/23: MR Conditional  Contains dry or latex rubber: No    GMDN P.T. name: Single-chamber implantable defibrillator      As of 3/21/2024   Status: Implanted Mode: VVI  Lower Rate (bpm): 40 V Tach Detect Rate: 171  V Fib Detect Rate: 200        Lead  Lead Dfbr Optisure 65cm 1 Coil Df4 Cnct True Bp Sense Hlx - [id number] - Implanted   (Right) Ventricle  Inventory item: LEAD DFBR OPTISURE 65CM 1 COIL DF4 CNCT TRUE BP SENSE HLX Model/Cat number: [id number]  Serial number: [id number] : [employer] Cardiac  Arrhythmias And Heart Failure  Device identifier: [id number] Device identifier type: GS1  Implant Tracking Number: [id number] Site: Ventricle  Laterality: Right Implanted by: Ayaan Don MD  Date of implant: 11/17/2023    GUDID Information   Request status Successful    Brand name: OPTISURE™ Version/Model: [id number]  Company name: [employer] MRI safety info as of 11/17/23: MR Conditional  Contains dry or latex rubber: No    GMDN P.T. name: Endocardial defibrillation lead      As of 11/17/2023   Status: Implanted            CATH Cardiac Device Check - Alert    Result Date: 3/8/2024  Table formatting from the original result was not included. Comments: VT W/ATP ALERT Device type: SC ICD Sensing and pacing adequately Battery status good Lead impedances and thresholds stable. 1 VT IN VF ZONE @ 203 BPM WITH SUCCESSFUL ATP X 1 detected 3/2/24 @ 0934 1 NSVT  BPM 3/2/24 @ 0317 Transthoracic impedance index does not support volume overload. Device noted to be functioning within normal limits. No changes recommended. Continue with routine ICD follow up. Yearly ICD check: 3/6/2025, LAST CHECK WAS ON 2/22/24 Message left on voicemail requesting call back to check for symptoms. EP follow up as needed with Dr. Don Cardiology follow up with Dr. SUYAPA Schultz Device RN- Lacey HANCOCK Date Battery Charge Time R Wave RV -Threshold RV ohms HV Ohms VT VF Pacing% Rhythm ICM visit Notes 11/17/23 KATHY - >12 0.5V@0.5 540 83 - - - - - implant CMY, CHF 11/27/23 8.8-10.4 NA >12 0.5V@0.5 480 80 2   0% SR ST PACs OFF WCK MC CorVue>ON 2/22/24  9.9 yrs 8.8s >12 0.5V@0.5 490 92 7 0 <1% SR  NL CC clinic   3/3/24 9.9 yrs 8.8s >12  91 1 NS 1 atp 0% ST 120s 12 dys ATP ALERT   Interpretation: Implants   ICD  Dfbr [place] 1 Chmbr Df4 Cnct Kbq22nt 92d95iq 30cc 69gm - [id number] - Implanted   (Left) Chest  Inventory item: DFBR GALLANT 1 CHMBR DF4 CNCT DJE75NV [id number] 30CC 69GM Model/Cat number: [id number]  Serial number:  [id number] : [employer] Cardiac Arrhythmias And Heart Failure  Device identifier: [id number] Device identifier type: GS1  Implant Tracking Number: [id number] Laterality: Left  Site: Chest Implanted by: Ayaan Don MD  Date of implant: 11/17/2023    GUDID Information   Request status Successful    Brand name: [place]™ Version/Model: [id number]  Company name: [employer] MRI safety info as of 11/17/23: MR Conditional  Contains dry or latex rubber: No    GMDN P.T. name: Single-chamber implantable defibrillator      As of 3/8/2024   Status: Implanted Mode: VVI  Lower Rate (bpm): 40 V Tach Detect Rate: 171  V Fib Detect Rate: 200        Lead  Lead Dfbr Optisure 65cm 1 Coil Df4 Cnct True Bp Sense Hlx - [id number] - Implanted   (Right) Ventricle  Inventory item: LEAD DFBR OPTISURE 65CM 1 COIL DF4 CNCT TRUE BP SENSE HLX Model/Cat number: [id number]  Serial number: [id number] : [employer] Cardiac Arrhythmias And Heart Failure  Device identifier: [id number] Device identifier type: GS1  Implant Tracking Number: [id number] Site: Ventricle  Laterality: Right Implanted by: Ayaan Don MD  Date of implant: 11/17/2023    GUDID Information   Request status Successful    Brand name: OPTISURE™ Version/Model: [id number]  Company name: [employer] MRI safety info as of 11/17/23: MR Conditional  Contains dry or latex rubber: No    GMDN P.T. name: Endocardial defibrillation lead      As of 11/17/2023   Status: Implanted            CATH Cardiac Device Check - In Clinic    Result Date: 2/22/2024  Table formatting from the original result was not included. Comments: Device type:  ICD Sensing and pacing adequately Battery status good Lead impedances and thresholds stable. 7 NS tachy episodes detected, no therapy delivered. Transthoracic impedance index does not support volume overload. Device noted to be functioning within normal limits. No changes recommended. Continue with routine ICD follow  up. Yearly ICD check: 3/6/25 EP follow up as needed with Dr. Don Cardiology follow up with Dr. Chaim Schultz Device RN-Lacey Date Battery Charge Time R Wave RV -Threshold RV ohms HV Ohms VT VF Pacing% Rhythm ICM visit Notes 11/17/23 KATHY - >12 0.5V@0.5 540 83 - - - - - implant CMY, CHF 11/27/23 8.8-10.4 NA >12 0.5V@0.5 480 80 2   0% SR ST PACs OFF [facility] MC CorVue>ON 2/22/24  yrs 8.8s >12 0.5V@0.5 490 92 7 0 <1% SR  NL CC clinic   Interpretation: Implants   ICD  Dfbr [place] 1 Chmbr Df4 Cnct Hza98uv 67j71gk 30cc 69gm - [id number] - Implanted   (Left) Chest  Inventory item: DFBR GALLANT 1 CHMBR DF4 CNCT CDJ46HZ 24G17OV 30CC 69GM Model/Cat number: [id number]  Serial number: [id number] : [employer] Cardiac Arrhythmias And Heart Failure  Device identifier: [id number] Device identifier type: GS1  Implant Tracking Number: [id number] Laterality: Left  Site: Chest Implanted by: Ayaan Don MD  Date of implant: 11/17/2023    GUDID Information   Request status Successful    Brand name: [place]™ Version/Model: [id number]  Company name: [employer] MRI safety info as of 11/17/23: MR Conditional  Contains dry or latex rubber: No    GMDN P.T. name: Single-chamber implantable defibrillator      As of 2/22/2024   Status: Implanted Mode: VVI  Lower Rate (bpm): 40 V Tach Detect Rate: 171  V Fib Detect Rate: 200        Lead  Lead Dfbr Optisure 65cm 1 Coil Df4 Cnct True Bp Sense Hlx - [id number] - Implanted   (Right) Ventricle  Inventory item: LEAD DFBR OPTISURE 65CM 1 COIL DF4 CNCT TRUE BP SENSE HLX Model/Cat number: [id number]  Serial number: [id number] : [employer] Cardiac Arrhythmias And Heart Failure  Device identifier: [id number] Device identifier type: GS1  Implant Tracking Number: [id number] Site: Ventricle  Laterality: Right Implanted by: Ayaan Don MD  Date of implant: 11/17/2023    GUDID Information   Request status Successful    Brand name: OPTISURE™ Version/Model: [id number]   Company name: [employer] MRI safety info as of 23: MR Conditional  Contains dry or latex rubber: No    GMDN P.T. name: Endocardial defibrillation lead      As of 2023   Status: Implanted             Results for orders placed during the hospital encounter of 24    TRANSTHORACIC ECHO (TTE) COMPLETE W/ W/O IMAGING AGENT    Impression  *[facility]*  [address] [place] [address]  Phone [phone]  Fax [phone]  Transthoracic Echocardiogram (TTE)    Patient: Moo Mcrae     Study Date/Time:       Mar 27 2024 11:42AM  MRN:     [id number]             FIN#:                  [id number]  :     1955          Ht/Wt:                 165cm 94.4kg  Age:     69                  BSA/BMI:               2.12m^2 34.7kg/m^2  Gender:  M                   Baseline BP:           123 / 65  Ordering Physician:    Patrick Mendoza    Attending Physician:   Joyce Pizarro  Performing Physician:  Evan Fields MD  Diagnostic Physician:  Evan Fields MD  Sonographer:           CARMELO Tyler    ------------------------------------------------------------------------------  INDICATIONS:  Heart failure.    ------------------------------------------------------------------------------  STUDY CONCLUSIONS  SUMMARY:    1. Left ventricle: The cavity size is moderately to severely dilated. Wall  thickness is normal. Systolic function is severely reduced. The ejection  fraction was measured by 3D assessment. There is severe diffuse hypokinesis  with regional variations. The tissue Doppler parameters are abnormal.  Diastolic dysfunction present but unable to assess severity. The ejection  fraction is 20%.  2. Mitral valve: The annulus is mildly calcified. The leaflets are mildly  thickened and mildly calcified. Posterior leaflet mobility is mildly  restricted. Inflow velocity is minimally increased. The findings are  consistent with trivial stenosis. There  is severe regurgitation, with  multiple jets, directed eccentrically, anteriorly, and posteriorly. The  mean diastolic gradient is 3mm Hg.  3. Left atrium: The atrium is mildly to moderately dilated.  4. Right ventricle: The cavity size is at the upper limits of normal. Systolic  function is mildly reduced by visual assessment. Systolic pressure is  moderately increased. The estimated peak pressure is 55mm Hg. Pacemaker  lead noted in right ventricle.  5. Right atrium: Pacemaker lead noted in right atrium.  6. Tricuspid valve: There is moderate regurgitation, with multiple jets  directed centrally and eccentrically.  7. Inferior vena cava: The IVC is dilated. Respirophasic diameter changes are  blunted (< 50%).  IMPRESSIONS:   The study shows worsening since the study of 11/09/2023.  There is severe mitral regurgitation. Increased pulmonary pressures.  Transesophageal echocardiogram and cards consult is recommended.  RECOMMENDATIONS:    1. If clinically warranted, consider transesophageal echocardiography.  2. Primary MD notified.    ------------------------------------------------------------------------------  STUDY DATA:   Procedure:  A transthoracic echocardiogram was performed. Image  quality was adequate. The study was technically limited due to body habitus.  Intravenous contrast (Definity 2ml) was administered to opacify the left  ventricle.  M-mode, complete 2D, 3D, complete spectral Doppler, color Doppler,  and intravenous contrast injection.  Study status:  Routine.  Study  completion:  There were no complications.    FINDINGS    BASELINE ECG:   Bradycardia.  LEFT VENTRICLE:  3D imaging and post-processing assessment performed. The  cavity size is moderately to severely dilated. Wall thickness is normal. There  is no evidence of hypertrophy. Systolic function is severely reduced. There is  severe diffuse hypokinesis with regional variations. Unable to accurately  assess left ventricular diastolic function  parameters due to technical  limitations.  There is no evidence of a thrombus revealed by acoustic contrast  opacification.    The ejection fraction was measured by 3D assessment. The  ejection fraction is 20%. The tissue Doppler parameters are abnormal.  Diastolic dysfunction present but unable to assess severity.    AORTIC VALVE:  The annulus is mildly calcified. The valve is probably  trileaflet. The leaflets are mildly thickened. Cusp separation is normal.  Velocity is within the normal range. There is no stenosis. There is trivial  regurgitation. The peak systolic gradient is 12mm Hg. The ratio of LVOT to  aortic valve peak velocity is 0.33. The valve area is 1.0cm^2. The valve area  index is 0.48cm^2/m^2.    AORTA:  Aortic root: The root is normal-sized.  Ascending aorta: The vessel is normal-sized.    MITRAL VALVE:  The annulus is mildly calcified. The leaflets are mildly  thickened and mildly calcified. Leaflet separation is mildly reduced.  Posterior leaflet mobility is mildly restricted. Inflow velocity is minimally  increased. The findings are consistent with trivial stenosis. There is severe  regurgitation, with multiple jets, directed eccentrically, anteriorly, and  posteriorly. The mean diastolic gradient is 3mm Hg. The peak diastolic  gradient is 8mm Hg. The valve area by pressure half-time is 5.1cm^2. The valve  area index by pressure half-time is 2.42cm^2/m^2. The valve area (LVOT  continuity) is 0.7cm^2. The valve area index (LVOT continuity) is  0.33cm^2/m^2.    LEFT ATRIUM:  The atrium is mildly to moderately dilated.    RIGHT VENTRICLE:  The cavity size is at the upper limits of normal. Systolic  function is mildly reduced by visual assessment.  Systolic pressure is  moderately increased. The estimated peak pressure is 55mm Hg. Pacemaker lead  noted in right ventricle.       The RV pressure during systole is 55mm Hg.    VENTRICULAR SEPTUM:   Thickness is normal. There is no diastolic  flattening  and no systolic flattening.  There is no evidence of a ventricular septal  defect.    PULMONIC VALVE:   Not well visualized. Velocity is within the normal range.  There is no significant regurgitation. The peak systolic gradient is 5mm Hg.    TRICUSPID VALVE:  There is moderate regurgitation, with multiple jets directed  centrally and eccentrically.    RIGHT ATRIUM:  The atrium is normal in size. Pacemaker lead noted in right  atrium.       The estimated central venous pressure is 15mm Hg.    PERICARDIUM:   There is no pericardial effusion.    SYSTEMIC VEINS:  Inferior vena cava: The IVC is dilated.  Respirophasic diameter changes are  blunted (< 50%).    ------------------------------------------------------------------------------  Measurements    Left ventricle           Value             Ref       11/09/2023  Aortic valve continued     Value          Ref       11/09/2023  SIL, LAX chord       (H) 6.8   cm          4.2 - 5.8 7.1         AR decel                   37.3  cm/s^2   --------- ----------  ESD, LAX chord       (H) 6.1   cm          2.5 - 4.0 6.3         AR PHT                     2394  ms       --------- ----------  SIL/bsa, LAX chord   (H) 3.2   cm/m^2      2.2 - 3.0 3.4         AR ED grad                 37    mm Hg    --------- ----------  ESD/bsa, LAX chord   (H) 2.9   cm/m^2      1.3 - 2.1 3.0  PW, ED, LAX          (N) 1.0   cm          0.6 - 1.0 0.9         Mitral valve               Value          Ref       11/09/2023  IVS, ED              (N) 0.9   cm          0.6 - 1.0 0.9         Mean v, D                  0.7   m/sec    --------- ----------  PW, ED               (N) 1.0   cm          0.6 - 1.0 0.9         Peak E                     1.39  m/sec    --------- ----------  IVS/PW, ED               0.9               --------- 1.07        Peak A                     0.96  m/sec    --------- ----------  EDV                  (H) 314   ml          62 - 150  356         VTI leaflet coapt           50.4  cm       --------- ----------  ESV                  (H) 227   ml          21 - 61   250         MiV/LVOT VTI               4.5            --------- ----------  EF                   (L) 20    %           52 - 72   20          Decel time                 180   ms       --------- ----------  SV                       52    ml          --------- 62          PHT                        53    ms       --------- ----------  EDV/bsa              (H) 148   ml/m^2      34 - 74   169         Mean grad, D               3     mm Hg    --------- ----------  ESV/bsa              (H) 107   ml/m^2      11 - 31   118         Peak grad, D               8     mm Hg    --------- ----------  SV/bsa                   25    ml/m^2      --------- 29          Peak E/A ratio             1.2            --------- ----------  E', med jose, TDI     (L) 4.3   cm/sec      >=7.0     ----------  A-VTI                      50.4  cm       --------- ----------  E/e', med jose, TDI       32                --------- ----------  MVA, PHT                   5.1   cm^2     --------- ----------  MVA/bsa, PHT               2.42  cm^2/m^2 --------- ----------  LVOT                     Value             Ref       11/09/2023  MVA, LVOT cont             0.7   cm^2     --------- ----------  Diam, S                  2.0   cm          --------- 2.1         MVA/bsa, LVOT cont         0.33  cm^2/m^2 --------- ----------  Area                     3.1   cm^2        --------- 3.5         MR vol, LVOT cont          50    ml       --------- ----------  Peak iggy, S              0.56  m/sec       --------- ----------  MR peak v                  4.59  m/sec    --------- ----------  Peak grad, S             1     mm Hg       --------- ----------  Peak LV-LA grad S          84    mm Hg    --------- ----------  Qs                       1.5   L/min       --------- ----------  Max MR v                   4.76  m/sec    --------- ----------  Qs/bsa                   0.7    L/(min-m^2) --------- ----------  Peak LV-LA grad S          91    mm Hg    --------- ----------  Regurg VTI                 153.0 cm       --------- ----------  Right ventricle          Value             Ref       11/09/2023  ERO, PISA                  0.34  cm^2     --------- ----------  TAPSE, MM            (N) 1.7   cm          >=1.7     ----------  Pressure, S              55    mm Hg       --------- ----------  Pulmonic valve             Value          Ref       11/09/2023  Peak v, S                  1.2   m/sec    --------- ----------  Left atrium              Value             Ref       11/09/2023  Peak grad, S               5     mm Hg    --------- ----------  AP dim, ES           (H) 5.7   cm          3.0 - 4.0 3.9  AP dim index, ES     (H) 2.7   cm/m^2      1.5 - 2.3 1.8         Tricuspid valve            Value          Ref       11/09/2023  Area ES, A4C         (H) 25    cm^2        <=20      ----------  TR peak v              (H) 3.2   m/sec    <=2.8     ----------  Area/bsa ES, A4C         11.85 cm^2/m^2    --------- ----------  Peak RV-RA grad, S         40    mm Hg    --------- ----------  Area ES, A2C             24    cm^2        --------- ----------  Area/bsa ES, A2C         11.28 cm^2/m^2    --------- ----------  Aortic root                Value          Ref       11/09/2023  Vol, ES, 1-p A4C     (H) 88    ml          18 - 58   ----------  Root diam, ED          (L) 2.5   cm       2.8 - 4.2 2.3  Vol/bsa, ES, 1-p A4C (H) 41    ml/m^2      12 - 37   ----------  S-T junct diam, ED     (N) 2.6   cm       2.3 - 3.5 2.6  Vol, ES, 1-p A2C     (H) 77    ml          18 - 58   ----------  S-T junct diam/bsa, ED (N) 1.2   cm/m^2   1.1 - 1.9 1.2  Vol/bsa, ES, 1-p A2C (N) 36    ml/m^2      11 - 43   ----------  Vol, ES, 2-p             85    ml          --------- ----------  Ascending aorta            Value          Ref       11/09/2023  Vol/bsa, ES, 2-p     (H) 40    ml/m^2      16 - 34   ----------  AAo AP  diam, ED        (N) 3.0   cm       2.2 - 3.8 3.0  AAo AP diam/bsa, ED    (N) 1.4   cm/m^2   1.1 - 1.9 1.4  Aortic valve             Value             Ref       11/09/2023  Peak v, S                1.7   m/sec       --------- ----------  Pulmonary artery           Value          Ref       11/09/2023  Peak grad, S             12    mm Hg       --------- ----------  Pressure, S                50    mm Hg    --------- ----------  LVOT/AV, Vpeak ratio     0.33              --------- ----------  EDWARD, Vmax                1.0   cm^2        --------- ----------  Systemic veins             Value          Ref       11/09/2023  EDWARD/bsa, Vmax            0.48  cm^2/m^2    --------- ----------  Estimated CVP              15    mm Hg    --------- 3  AR ED v                  3.05  m/s         --------- ----------  Legend:  (L)  and  (H)  mariela values outside specified reference range.    (N)  marks values inside specified reference range.    Prepared and electronically signed by  Mariela Fields MD  03/27/2024 13:49  ASSESSMENT AND PLAN:      Impression: Moo Mcrae is a 69 year old male with a PMHx of cardiogenic syncope s/p ICD placement 11/2023, chronic HFrEF (20-25%), label of non-ischemic cardiomyopathy, CKD stage 4, KAYLA on CPAP, ulcerative colitis, DM, obesity, who presented with monomorphic VT, was shocked by his ICD. Cardiology was consulted to evaluate for ischemic cardiomyopathy.      Assessment:  # Acute on Chronic HFrEF (20%), ACC/AHA C, NYHA III - Compensated   - Diuresed to euvolemia on Bumex drip & daily Metolazone   - RHC 4/4/24 showed PCWP 6, diuresis was discontinued   -4/12 fluid removal by HD per nephrology  -4/15 HD on 4/16   # Severe Mitral Regurgitation, likely Functional   - MAGALIE on 04/03/24: Posterior leaflet mobility is severely restricted. Torrential MR, multiple jets. LA height 4.5cm. Posterior leaflet length 0.97cm. MVA 3.4 cm2. Mean diastolic gradient 3mmHg. Structural Cardiology consulted. Will need [facility]  prior to further TRACEY workup.  4/11 Per Dr. Fields: \"mitral valve regurgitation would be best managed given his body habitus and comorbidities with transcatheter yukp-sm-hjab resection. \"  4/12 will get limited TTE today to reevaluate MR  4/12 Limited echo  SUMMARY:  1. Left ventricle: The cavity size is mildly dilated. Wall thickness is mildly     increased. There is concentric hypertrophy. Systolic function is severely     reduced. The ejection fraction was measured by visual estimation.     Dyskinesis of the inferior wall. The ejection fraction is 25%.  2. Mitral valve: There is at least mild regurgitation with eccentric jets.     Consider transesophageal echocardiogram or magnetic resonance imaging for     further quantification.  4/12 Reviewed by Dr. Fields cardiology team.   4/15 Has follow up in Valve clinic 4/29  # Monomorphic VT   - Most likely etiology due to scar/cardiomyopathy but cannot definitely rule out ischemia. No prior LHC. Troponin elevation similar to prior admission.   - EP on board, on amiodarone  -4/12 NO MMVT on telemetry 4/15 NO MMVT  # Large severe Anterior wall defect on MPI   Prior stress test July 2020 showed no reversible ischemia.   - MPI shows large, severe defect involving the mid and apical anterior, mid anteroseptal, mid and apical inferior, apical lateral, apical septal, and apical wall(s). After attenuation correction, SSS=12, SRS=20.  - Cath/PV Case 4/10    Prox LAD lesion with 30% stenosis.    Mid RCA lesion with 30% stenosis.    Mid Cx lesion with 20% stenosis.    1st Diag lesion with 20% stenosis.  Conclusions:  Normal LVEDP  Non-obstructive coronary artery disease  Continue ASA, statin, bblkr, rosuvastatin  # Hx of Cardiogenic Syncope s/p ICD placement follows up in [facility] device clinic  # Cardiorenal Syndrome   # MAXIMUS on CKD creatinine 3.60<4.39  Followed by renal, On HD with catheter dressing C/D/I. 4/11 HD removed 1.5 l  Will continue with HD per nephrology HD TTHSAT  #  Moderate TR will follow up in valve clinic  # HLD      Recommendations:  -Continue aspirin 81 mg daily.   -Continue rosuvastatin 20 mg daily  -Continue torsemide 40 mg twice daily  -Continue Toprol XL 12.5 daily  -Continue amiodarone 200 mg daily  -Continue to hold losartan 25mg daily,   -Strict intake and output  -Patient to follow up with Dr. Isaacs and Valve clinic following discharge.      Moo is ready for discharge from my viewpoint: yes but currently looking into [facility] with HD  Workup needed prior to discharge:  none  Medications changes at discharge: Amiodarone 200 mg daily, aspirin 81 mg daily, Toprol 12.5 mg daily, rosuvastatin 20 mg daily, torsemide 40 mg p.o. twice daily  Follow up appointments needed after discharge:   .   Valve clinic 4/29/24 with Dr. Avila     Estimated Date of Discharge from cardiology standpoint: 4/16/2024 depending on [facility] placement and HD      #. Use of ARNI/ARB/ACEi: No. Contraindicated due to current acute renal injury.  #. Use of B-blocker: Yes.  #. Use of SGLT-2i: No. Contraindicated due to current acute renal injury.  #. Use of aldosterone antagonist: No. Contraindicated due to current acute renal injury.  #. Hydralazine & isosorbide dinitrate therapy for HFrEF in patients that self-identify as Black/AA: No. N/A     #. Consideration of ICD (for primary prevention of SCD in LVEF ? 35%):  Already has it  #. Consideration of CRT-D or CRT-P (LVEF ? 35% with QRS >150 and LBBB) : Not a candidate at this time. Plan to have patient on 3 months of optimal goal directed medical therapy.      #. Anticoagulation (DOAC) for Atrial flutter or atrial fibrillation: N/A     Steffanie Bauer, CNP    4/15/37899:36 PM

## 2024-04-17 ENCOUNTER — CLINICAL DOCUMENTATION (OUTPATIENT)
Dept: SPIRITUAL SERVICES | Age: 64
End: 2024-04-17

## 2024-04-17 NOTE — ACP (ADVANCE CARE PLANNING)
Advance Care Planning   Ambulatory ACP Specialist Patient Outreach    Date:  4/17/2024    ACP Specialist:  UDAY Gunn    Outreach call to patient in follow-up to ACP Specialist referral from:Lopez Rosario PA    [x] PCP  [] Provider   [] Ambulatory Care Management [] Other     For:                  [x] Advance Directive Assistance              [x] Complete Portable DNR order              [x] Complete POST/POLST/MOST              [x] Code Status Discussion             [x] Discuss Goals of Care             [] Early ACP Decision-Making              [] Other (Specify)    Date Referral Received:04/09/2024    Next Step:   [] ACP scheduled conversation  [x] Outreach again in one week               [] Email / Mail ACP Info Sheets  [] Email / Mail Advance Directive   [] Closing referral.  Routing closure to referring provider/staff and to ACP Specialist .    [] Closure letter mailed to patient with invitation to contact ACP Specialist if / when ready.   [] Other (Specify here):         [x] At this time, Healthcare Decision Maker Is:        [x] Primary agent named in scanned advance directive.    [] Legal Next of Kin.     [] Unable to determine legal decision maker at this time.         Outreaches:          [x] 2nd -  Date:  04/17/2024               Intervention:  [] Spoke with Patient  [x] Left Voice mail [] Email / Mail    [] [facility] [facility]  [] Other (Specify) :              Outcomes:Placed call to home/mobile number for scheduled 3pm follow up ACP visit. No answer; left VM on unidentified mailbox. Will make another attempt in 1 week to offer ACP support to this pt. Goal for today was to review the dnr form that was sent to pt for review. Will continue to follow.               [] 3rd -  Date:                Intervention:  [] Spoke with Patient   [] Left Voice mail [] Email / Mail    [] [employer]  [] Other (Specify) :       Outcomes:    Thank you for this referral.

## 2024-04-19 ENCOUNTER — OFFICE VISIT (OUTPATIENT)
Dept: NEUROLOGY | Age: 64
End: 2024-04-19

## 2024-04-19 VITALS
SYSTOLIC BLOOD PRESSURE: 121 MMHG | HEIGHT: 70 IN | WEIGHT: 189 LBS | DIASTOLIC BLOOD PRESSURE: 65 MMHG | BODY MASS INDEX: 27.06 KG/M2 | HEART RATE: 89 BPM

## 2024-04-19 DIAGNOSIS — G56.22 ULNAR NEUROPATHY AT ELBOW OF LEFT UPPER EXTREMITY: ICD-10-CM

## 2024-04-19 DIAGNOSIS — G62.9 NEUROPATHY: Primary | ICD-10-CM

## 2024-04-19 DIAGNOSIS — M54.12 CERVICAL RADICULAR PAIN: Chronic | ICD-10-CM

## 2024-04-19 DIAGNOSIS — G56.03 BILATERAL CARPAL TUNNEL SYNDROME: ICD-10-CM

## 2024-04-19 RX ORDER — DULOXETIN HYDROCHLORIDE 30 MG/1
30 CAPSULE, DELAYED RELEASE ORAL DAILY
Qty: 30 CAPSULE | Refills: 3 | Status: SHIPPED | OUTPATIENT
Start: 2024-04-19

## 2024-04-19 NOTE — PROGRESS NOTES
Occupational History    Not on file   Tobacco Use    Smoking status: Former     Current packs/day: 0.00     Average packs/day: 1 pack/day for 45.0 years (45.0 ttl pk-yrs)     Types: Cigarettes     Start date: 1972     Quit date: 2017     Years since quittin.2    Smokeless tobacco: Never   Vaping Use    Vaping Use: Never used   Substance and Sexual Activity    Alcohol use: Yes     Comment: almost daily    Drug use: Not on file     Comment: Cocaine- stopped spring 2016    Sexual activity: Yes     Partners: Female   Other Topics Concern    Not on file   Social History Narrative     in the past, retired     Social Determinants of Health     Financial Resource Strain: Low Risk  (9/15/2023)    Overall Financial Resource Strain (CARDIA)     Difficulty of Paying Living Expenses: Not hard at all   Food Insecurity: No Food Insecurity (3/31/2024)    Hunger Vital Sign     Worried About Running Out of Food in the Last Year: Never true     Ran Out of Food in the Last Year: Never true   Transportation Needs: No Transportation Needs (3/31/2024)    PRAPARE - Transportation     Lack of Transportation (Medical): No     Lack of Transportation (Non-Medical): No   Physical Activity: Inactive (2022)    Exercise Vital Sign     Days of Exercise per Week: 0 days     Minutes of Exercise per Session: 0 min   Stress: Not on file   Social Connections: Not on file   Intimate Partner Violence: Not on file   Housing Stability: Low Risk  (3/31/2024)    Housing Stability Vital Sign     Unable to Pay for Housing in the Last Year: No     Number of Places Lived in the Last Year: 1     Unstable Housing in the Last Year: No       CURRENT MEDICATIONS:     Current Outpatient Medications   Medication Sig Dispense Refill    lansoprazole (PREVACID SOLUTAB) 30 MG disintegrating tablet 1 tablet by Per G Tube route every morning (before breakfast) 30 tablet 0    potassium chloride 20 MEQ/15ML (10%) oral solution Take 30 mLs by

## 2024-04-23 ENCOUNTER — HOSPITAL ENCOUNTER (INPATIENT)
Age: 64
LOS: 8 days | Discharge: NURSING, REHAB OR LONG-TERM CARE | DRG: 640 | End: 2024-05-02
Attending: EMERGENCY MEDICINE | Admitting: INTERNAL MEDICINE
Payer: COMMERCIAL

## 2024-04-23 DIAGNOSIS — R10.9 ABDOMINAL PAIN, UNSPECIFIED ABDOMINAL LOCATION: ICD-10-CM

## 2024-04-23 DIAGNOSIS — I48.92 ATRIAL FIB/FLUTTER, TRANSIENT (HCC): ICD-10-CM

## 2024-04-23 DIAGNOSIS — G89.29 CHRONIC MIDLINE LOW BACK PAIN WITHOUT SCIATICA: ICD-10-CM

## 2024-04-23 DIAGNOSIS — I48.91 ATRIAL FIB/FLUTTER, TRANSIENT (HCC): ICD-10-CM

## 2024-04-23 DIAGNOSIS — M54.50 CHRONIC MIDLINE LOW BACK PAIN WITHOUT SCIATICA: ICD-10-CM

## 2024-04-23 DIAGNOSIS — E87.6 HYPOKALEMIA: ICD-10-CM

## 2024-04-23 DIAGNOSIS — R76.8 ANA POSITIVE: ICD-10-CM

## 2024-04-23 DIAGNOSIS — E83.42 HYPOMAGNESEMIA: Primary | ICD-10-CM

## 2024-04-23 LAB
ALBUMIN SERPL-MCNC: 2.6 G/DL (ref 3.5–5.2)
ALP SERPL-CCNC: 175 U/L (ref 40–129)
ALT SERPL-CCNC: 14 U/L (ref 5–41)
ANION GAP SERPL CALCULATED.3IONS-SCNC: 19 MMOL/L (ref 9–17)
AST SERPL-CCNC: 57 U/L
BASOPHILS # BLD: 0 K/UL (ref 0–0.2)
BASOPHILS NFR BLD: 0 % (ref 0–2)
BILIRUB SERPL-MCNC: 1.9 MG/DL (ref 0.3–1.2)
BUN SERPL-MCNC: 7 MG/DL (ref 8–23)
CALCIUM SERPL-MCNC: 8.1 MG/DL (ref 8.6–10.4)
CHLORIDE SERPL-SCNC: 97 MMOL/L (ref 98–107)
CO2 SERPL-SCNC: 20 MMOL/L (ref 20–31)
CREAT SERPL-MCNC: 0.8 MG/DL (ref 0.7–1.2)
EOSINOPHIL # BLD: 0 K/UL (ref 0–0.4)
EOSINOPHILS RELATIVE PERCENT: 1 % (ref 0–4)
ERYTHROCYTE [DISTWIDTH] IN BLOOD BY AUTOMATED COUNT: 14.2 % (ref 11.5–14.9)
GFR SERPL CREATININE-BSD FRML MDRD: >90 ML/MIN/1.73M2
GLUCOSE SERPL-MCNC: 71 MG/DL (ref 70–99)
HCT VFR BLD AUTO: 34.5 % (ref 41–53)
HGB BLD-MCNC: 11.5 G/DL (ref 13.5–17.5)
INR PPP: 1.4
LYMPHOCYTES NFR BLD: 1 K/UL (ref 1–4.8)
LYMPHOCYTES RELATIVE PERCENT: 16 % (ref 24–44)
MAGNESIUM SERPL-MCNC: 1.4 MG/DL (ref 1.6–2.6)
MCH RBC QN AUTO: 31.6 PG (ref 26–34)
MCHC RBC AUTO-ENTMCNC: 33.3 G/DL (ref 31–37)
MCV RBC AUTO: 94.8 FL (ref 80–100)
MONOCYTES NFR BLD: 0.8 K/UL (ref 0.1–1.3)
MONOCYTES NFR BLD: 13 % (ref 1–7)
NEUTROPHILS NFR BLD: 70 % (ref 36–66)
NEUTS SEG NFR BLD: 4.4 K/UL (ref 1.3–9.1)
PLATELET # BLD AUTO: 103 K/UL (ref 150–450)
PMV BLD AUTO: 7.5 FL (ref 6–12)
POTASSIUM SERPL-SCNC: 3.2 MMOL/L (ref 3.7–5.3)
PROT SERPL-MCNC: 6 G/DL (ref 6.4–8.3)
PROTHROMBIN TIME: 17.1 SEC (ref 11.8–14.6)
RBC # BLD AUTO: 3.64 M/UL (ref 4.5–5.9)
SODIUM SERPL-SCNC: 136 MMOL/L (ref 135–144)
TROPONIN I SERPL HS-MCNC: 30 NG/L (ref 0–22)
WBC OTHER # BLD: 6.2 K/UL (ref 3.5–11)

## 2024-04-23 PROCEDURE — 96365 THER/PROPH/DIAG IV INF INIT: CPT

## 2024-04-23 PROCEDURE — 36415 COLL VENOUS BLD VENIPUNCTURE: CPT

## 2024-04-23 PROCEDURE — 83735 ASSAY OF MAGNESIUM: CPT

## 2024-04-23 PROCEDURE — 83690 ASSAY OF LIPASE: CPT

## 2024-04-23 PROCEDURE — 93005 ELECTROCARDIOGRAM TRACING: CPT | Performed by: EMERGENCY MEDICINE

## 2024-04-23 PROCEDURE — 80053 COMPREHEN METABOLIC PANEL: CPT

## 2024-04-23 PROCEDURE — 96366 THER/PROPH/DIAG IV INF ADDON: CPT

## 2024-04-23 PROCEDURE — 6370000000 HC RX 637 (ALT 250 FOR IP): Performed by: EMERGENCY MEDICINE

## 2024-04-23 PROCEDURE — 96376 TX/PRO/DX INJ SAME DRUG ADON: CPT

## 2024-04-23 PROCEDURE — 84484 ASSAY OF TROPONIN QUANT: CPT

## 2024-04-23 PROCEDURE — 6360000002 HC RX W HCPCS: Performed by: EMERGENCY MEDICINE

## 2024-04-23 PROCEDURE — 85610 PROTHROMBIN TIME: CPT

## 2024-04-23 PROCEDURE — 85025 COMPLETE CBC W/AUTO DIFF WBC: CPT

## 2024-04-23 PROCEDURE — 99285 EMERGENCY DEPT VISIT HI MDM: CPT

## 2024-04-23 PROCEDURE — 96375 TX/PRO/DX INJ NEW DRUG ADDON: CPT

## 2024-04-23 RX ORDER — MECLIZINE HYDROCHLORIDE 25 MG/1
25 TABLET ORAL ONCE
Status: COMPLETED | OUTPATIENT
Start: 2024-04-23 | End: 2024-04-23

## 2024-04-23 RX ORDER — ONDANSETRON 2 MG/ML
4 INJECTION INTRAMUSCULAR; INTRAVENOUS ONCE
Status: COMPLETED | OUTPATIENT
Start: 2024-04-23 | End: 2024-04-23

## 2024-04-23 RX ADMIN — MECLIZINE HYDROCHLORIDE 25 MG: 25 TABLET ORAL at 22:06

## 2024-04-23 RX ADMIN — ONDANSETRON 4 MG: 2 INJECTION INTRAMUSCULAR; INTRAVENOUS at 22:06

## 2024-04-23 ASSESSMENT — PAIN - FUNCTIONAL ASSESSMENT: PAIN_FUNCTIONAL_ASSESSMENT: NONE - DENIES PAIN

## 2024-04-23 NOTE — PROGRESS NOTES
Dr. Enrico Van rounding on patient. Discussed patient's condition, plan of care. Keep patient in ICU until this evening. Kenny Alex is a 85 y.o. male who was seen in clinic today (4/23/2024).    Assessment & Plan:   Below is the assessment and plan developed based on review of pertinent history, physical exam, labs, studies, and medications.    1. Acute left-sided low back pain with left-sided sciatica  -     predniSONE (DELTASONE) 20 MG tablet; Take 3 tablets by mouth daily for 2 days, THEN 2 tablets daily for 2 days, THEN 1 tablet daily for 2 days., Disp-12 tablet, R-0Normal  2. Prostate cancer (HCC)  Assessment & Plan:   Monitored by specialist- no acute findings meriting change in the plan  Urology      No follow-ups on file.    Subjective:   Kenny was seen today for Other (Left side Back and leg pain)     Patient c/o left hip pain that radiates down into his left lower leg x1 week.  Patient reported that he started having the pain abruptly last week.  Denies injury to leg/back.  Patient reported it is aching pain.   Patient reported is was significant and had to use a cane/walker to get around, pain has improved some on its own and he is not needing cane at this time.     Review of Systems   Musculoskeletal:  Positive for arthralgias and back pain.   Neurological:  Positive for numbness.          Objective:     Vitals:    04/23/24 1441   BP: (!) 143/73   Site: Left Upper Arm   Position: Sitting   Cuff Size: Medium Adult   Pulse: 75   Resp: 16   Temp: 98.5 °F (36.9 °C)   TempSrc: Oral   SpO2: 98%   Weight: 53.9 kg (118 lb 12.8 oz)   Height: 1.448 m (4' 9\")      Body mass index is 25.71 kg/m².     Physical Exam  Constitutional:       Appearance: Normal appearance.   HENT:      Head: Normocephalic.   Eyes:      Conjunctiva/sclera: Conjunctivae normal.   Cardiovascular:      Rate and Rhythm: Normal rate and regular rhythm.      Pulses: Normal pulses.      Heart sounds: Normal heart sounds.   Pulmonary:      Effort: Pulmonary effort is normal.      Breath sounds: Normal breath sounds.   Musculoskeletal:      Lumbar back: Tenderness

## 2024-04-24 ENCOUNTER — APPOINTMENT (OUTPATIENT)
Dept: MRI IMAGING | Age: 64
DRG: 640 | End: 2024-04-24
Payer: COMMERCIAL

## 2024-04-24 ENCOUNTER — APPOINTMENT (OUTPATIENT)
Dept: CT IMAGING | Age: 64
DRG: 640 | End: 2024-04-24
Payer: COMMERCIAL

## 2024-04-24 LAB
ANION GAP SERPL CALCULATED.3IONS-SCNC: 22 MMOL/L (ref 9–17)
BACTERIA URNS QL MICRO: ABNORMAL
BILIRUB UR QL STRIP: NEGATIVE
BUN SERPL-MCNC: 11 MG/DL (ref 8–23)
CALCIUM SERPL-MCNC: 8.2 MG/DL (ref 8.6–10.4)
CASTS #/AREA URNS LPF: ABNORMAL /LPF
CHLORIDE SERPL-SCNC: 96 MMOL/L (ref 98–107)
CLARITY UR: ABNORMAL
CO2 SERPL-SCNC: 18 MMOL/L (ref 20–31)
COLOR UR: ABNORMAL
CREAT SERPL-MCNC: 0.9 MG/DL (ref 0.7–1.2)
EPI CELLS #/AREA URNS HPF: ABNORMAL /HPF
GFR SERPL CREATININE-BSD FRML MDRD: >90 ML/MIN/1.73M2
GLUCOSE BLD-MCNC: 105 MG/DL (ref 75–110)
GLUCOSE SERPL-MCNC: 99 MG/DL (ref 70–99)
GLUCOSE UR STRIP-MCNC: NEGATIVE MG/DL
HGB UR QL STRIP.AUTO: ABNORMAL
KETONES UR STRIP-MCNC: ABNORMAL MG/DL
LEUKOCYTE ESTERASE UR QL STRIP: ABNORMAL
LIPASE SERPL-CCNC: 14 U/L (ref 13–60)
NITRITE UR QL STRIP: NEGATIVE
PH UR STRIP: 5.5 [PH] (ref 5–8)
POTASSIUM SERPL-SCNC: 4.5 MMOL/L (ref 3.7–5.3)
PROT UR STRIP-MCNC: ABNORMAL MG/DL
RBC #/AREA URNS HPF: ABNORMAL /HPF
SODIUM SERPL-SCNC: 136 MMOL/L (ref 135–144)
SP GR UR STRIP: 1.02 (ref 1–1.03)
TSH SERPL DL<=0.05 MIU/L-ACNC: 0.8 UIU/ML (ref 0.3–5)
UROBILINOGEN UR STRIP-ACNC: NORMAL EU/DL (ref 0–1)
WBC #/AREA URNS HPF: ABNORMAL /HPF

## 2024-04-24 PROCEDURE — 2580000003 HC RX 258: Performed by: INTERNAL MEDICINE

## 2024-04-24 PROCEDURE — 84443 ASSAY THYROID STIM HORMONE: CPT

## 2024-04-24 PROCEDURE — 6360000004 HC RX CONTRAST MEDICATION: Performed by: EMERGENCY MEDICINE

## 2024-04-24 PROCEDURE — 2580000003 HC RX 258: Performed by: EMERGENCY MEDICINE

## 2024-04-24 PROCEDURE — 74177 CT ABD & PELVIS W/CONTRAST: CPT

## 2024-04-24 PROCEDURE — 72158 MRI LUMBAR SPINE W/O & W/DYE: CPT

## 2024-04-24 PROCEDURE — 99223 1ST HOSP IP/OBS HIGH 75: CPT | Performed by: INTERNAL MEDICINE

## 2024-04-24 PROCEDURE — 70450 CT HEAD/BRAIN W/O DYE: CPT

## 2024-04-24 PROCEDURE — 6370000000 HC RX 637 (ALT 250 FOR IP): Performed by: INTERNAL MEDICINE

## 2024-04-24 PROCEDURE — 6370000000 HC RX 637 (ALT 250 FOR IP): Performed by: EMERGENCY MEDICINE

## 2024-04-24 PROCEDURE — 36415 COLL VENOUS BLD VENIPUNCTURE: CPT

## 2024-04-24 PROCEDURE — 82947 ASSAY GLUCOSE BLOOD QUANT: CPT

## 2024-04-24 PROCEDURE — A9579 GAD-BASE MR CONTRAST NOS,1ML: HCPCS | Performed by: EMERGENCY MEDICINE

## 2024-04-24 PROCEDURE — 6360000002 HC RX W HCPCS: Performed by: INTERNAL MEDICINE

## 2024-04-24 PROCEDURE — 1200000000 HC SEMI PRIVATE

## 2024-04-24 PROCEDURE — 80048 BASIC METABOLIC PNL TOTAL CA: CPT

## 2024-04-24 PROCEDURE — 81001 URINALYSIS AUTO W/SCOPE: CPT

## 2024-04-24 PROCEDURE — 2500000003 HC RX 250 WO HCPCS: Performed by: EMERGENCY MEDICINE

## 2024-04-24 PROCEDURE — 72156 MRI NECK SPINE W/O & W/DYE: CPT

## 2024-04-24 PROCEDURE — 6360000002 HC RX W HCPCS: Performed by: EMERGENCY MEDICINE

## 2024-04-24 PROCEDURE — 72157 MRI CHEST SPINE W/O & W/DYE: CPT

## 2024-04-24 RX ORDER — SODIUM CHLORIDE 0.9 % (FLUSH) 0.9 %
5-40 SYRINGE (ML) INJECTION PRN
Status: DISCONTINUED | OUTPATIENT
Start: 2024-04-24 | End: 2024-05-02 | Stop reason: HOSPADM

## 2024-04-24 RX ORDER — ONDANSETRON 4 MG/1
4 TABLET, ORALLY DISINTEGRATING ORAL EVERY 8 HOURS PRN
Status: DISCONTINUED | OUTPATIENT
Start: 2024-04-24 | End: 2024-05-02 | Stop reason: HOSPADM

## 2024-04-24 RX ORDER — POTASSIUM CHLORIDE 7.45 MG/ML
10 INJECTION INTRAVENOUS PRN
Status: DISCONTINUED | OUTPATIENT
Start: 2024-04-24 | End: 2024-05-02 | Stop reason: HOSPADM

## 2024-04-24 RX ORDER — FERROUS SULFATE 325(65) MG
325 TABLET ORAL 2 TIMES DAILY
Status: DISCONTINUED | OUTPATIENT
Start: 2024-04-24 | End: 2024-05-02 | Stop reason: HOSPADM

## 2024-04-24 RX ORDER — POTASSIUM CHLORIDE 20 MEQ/1
40 TABLET, EXTENDED RELEASE ORAL PRN
Status: DISCONTINUED | OUTPATIENT
Start: 2024-04-24 | End: 2024-05-02 | Stop reason: HOSPADM

## 2024-04-24 RX ORDER — SUCRALFATE 1 G/1
1 TABLET ORAL EVERY 8 HOURS SCHEDULED
Status: DISCONTINUED | OUTPATIENT
Start: 2024-04-24 | End: 2024-05-02 | Stop reason: HOSPADM

## 2024-04-24 RX ORDER — NADOLOL 20 MG/1
10 TABLET ORAL DAILY
Status: DISCONTINUED | OUTPATIENT
Start: 2024-04-24 | End: 2024-04-25

## 2024-04-24 RX ORDER — DULOXETIN HYDROCHLORIDE 30 MG/1
30 CAPSULE, DELAYED RELEASE ORAL DAILY
Status: DISCONTINUED | OUTPATIENT
Start: 2024-04-24 | End: 2024-05-02 | Stop reason: HOSPADM

## 2024-04-24 RX ORDER — ONDANSETRON 2 MG/ML
4 INJECTION INTRAMUSCULAR; INTRAVENOUS EVERY 6 HOURS PRN
Status: DISCONTINUED | OUTPATIENT
Start: 2024-04-24 | End: 2024-05-02 | Stop reason: HOSPADM

## 2024-04-24 RX ORDER — SODIUM CHLORIDE 0.9 % (FLUSH) 0.9 %
10 SYRINGE (ML) INJECTION PRN
Status: DISCONTINUED | OUTPATIENT
Start: 2024-04-24 | End: 2024-05-02 | Stop reason: HOSPADM

## 2024-04-24 RX ORDER — ENOXAPARIN SODIUM 100 MG/ML
40 INJECTION SUBCUTANEOUS DAILY
Status: DISCONTINUED | OUTPATIENT
Start: 2024-04-24 | End: 2024-05-02 | Stop reason: HOSPADM

## 2024-04-24 RX ORDER — POTASSIUM CHLORIDE 20 MEQ/1
40 TABLET, EXTENDED RELEASE ORAL ONCE
Status: COMPLETED | OUTPATIENT
Start: 2024-04-24 | End: 2024-04-24

## 2024-04-24 RX ORDER — 0.9 % SODIUM CHLORIDE 0.9 %
100 INTRAVENOUS SOLUTION INTRAVENOUS ONCE
Status: COMPLETED | OUTPATIENT
Start: 2024-04-24 | End: 2024-04-24

## 2024-04-24 RX ORDER — MAGNESIUM SULFATE HEPTAHYDRATE 40 MG/ML
2000 INJECTION, SOLUTION INTRAVENOUS ONCE
Status: COMPLETED | OUTPATIENT
Start: 2024-04-24 | End: 2024-04-24

## 2024-04-24 RX ORDER — LANOLIN ALCOHOL/MO/W.PET/CERES
200 CREAM (GRAM) TOPICAL DAILY
Status: DISCONTINUED | OUTPATIENT
Start: 2024-04-24 | End: 2024-05-02 | Stop reason: HOSPADM

## 2024-04-24 RX ORDER — LANSOPRAZOLE 30 MG/1
30 TABLET, ORALLY DISINTEGRATING, DELAYED RELEASE ORAL
Status: DISCONTINUED | OUTPATIENT
Start: 2024-04-25 | End: 2024-04-24

## 2024-04-24 RX ORDER — ACETAMINOPHEN 650 MG/1
650 SUPPOSITORY RECTAL EVERY 6 HOURS PRN
Status: DISCONTINUED | OUTPATIENT
Start: 2024-04-24 | End: 2024-05-02 | Stop reason: HOSPADM

## 2024-04-24 RX ORDER — SODIUM CHLORIDE 9 MG/ML
INJECTION, SOLUTION INTRAVENOUS PRN
Status: DISCONTINUED | OUTPATIENT
Start: 2024-04-24 | End: 2024-05-02 | Stop reason: HOSPADM

## 2024-04-24 RX ORDER — POLYETHYLENE GLYCOL 3350 17 G/17G
17 POWDER, FOR SOLUTION ORAL DAILY PRN
Status: DISCONTINUED | OUTPATIENT
Start: 2024-04-24 | End: 2024-05-02 | Stop reason: HOSPADM

## 2024-04-24 RX ORDER — ONDANSETRON 2 MG/ML
8 INJECTION INTRAMUSCULAR; INTRAVENOUS ONCE
Status: COMPLETED | OUTPATIENT
Start: 2024-04-24 | End: 2024-04-24

## 2024-04-24 RX ORDER — SODIUM CHLORIDE 0.9 % (FLUSH) 0.9 %
5-40 SYRINGE (ML) INJECTION EVERY 12 HOURS SCHEDULED
Status: DISCONTINUED | OUTPATIENT
Start: 2024-04-24 | End: 2024-05-02 | Stop reason: HOSPADM

## 2024-04-24 RX ORDER — ONDANSETRON 2 MG/ML
4 INJECTION INTRAMUSCULAR; INTRAVENOUS ONCE
Status: COMPLETED | OUTPATIENT
Start: 2024-04-24 | End: 2024-04-24

## 2024-04-24 RX ORDER — LACTULOSE 10 G/15ML
30 SOLUTION ORAL 3 TIMES DAILY
Status: DISCONTINUED | OUTPATIENT
Start: 2024-04-24 | End: 2024-05-02 | Stop reason: HOSPADM

## 2024-04-24 RX ORDER — MAGNESIUM SULFATE HEPTAHYDRATE 40 MG/ML
2000 INJECTION, SOLUTION INTRAVENOUS PRN
Status: DISCONTINUED | OUTPATIENT
Start: 2024-04-24 | End: 2024-05-02 | Stop reason: HOSPADM

## 2024-04-24 RX ORDER — SODIUM CHLORIDE 9 MG/ML
INJECTION, SOLUTION INTRAVENOUS CONTINUOUS
Status: DISCONTINUED | OUTPATIENT
Start: 2024-04-24 | End: 2024-04-29

## 2024-04-24 RX ORDER — ACETAMINOPHEN 325 MG/1
650 TABLET ORAL EVERY 6 HOURS PRN
Status: DISCONTINUED | OUTPATIENT
Start: 2024-04-24 | End: 2024-05-02 | Stop reason: HOSPADM

## 2024-04-24 RX ADMIN — SODIUM CHLORIDE, PRESERVATIVE FREE 10 ML: 5 INJECTION INTRAVENOUS at 09:58

## 2024-04-24 RX ADMIN — GADOTERIDOL 17 ML: 279.3 INJECTION, SOLUTION INTRAVENOUS at 14:37

## 2024-04-24 RX ADMIN — NADOLOL 10 MG: 20 TABLET ORAL at 17:08

## 2024-04-24 RX ADMIN — SODIUM CHLORIDE: 9 INJECTION, SOLUTION INTRAVENOUS at 09:03

## 2024-04-24 RX ADMIN — SUCRALFATE 1 G: 1 TABLET ORAL at 23:45

## 2024-04-24 RX ADMIN — ONDANSETRON 8 MG: 2 INJECTION INTRAMUSCULAR; INTRAVENOUS at 08:58

## 2024-04-24 RX ADMIN — DULOXETINE HYDROCHLORIDE 30 MG: 30 CAPSULE, DELAYED RELEASE ORAL at 16:09

## 2024-04-24 RX ADMIN — SUCRALFATE 1 G: 1 TABLET ORAL at 16:09

## 2024-04-24 RX ADMIN — POTASSIUM CHLORIDE 40 MEQ: 1500 TABLET, EXTENDED RELEASE ORAL at 04:12

## 2024-04-24 RX ADMIN — Medication 200 MG: at 16:09

## 2024-04-24 RX ADMIN — FERROUS SULFATE TAB 325 MG (65 MG ELEMENTAL FE) 325 MG: 325 (65 FE) TAB at 20:09

## 2024-04-24 RX ADMIN — LACTULOSE 30 G: 20 SOLUTION ORAL at 16:08

## 2024-04-24 RX ADMIN — ONDANSETRON 4 MG: 2 INJECTION INTRAMUSCULAR; INTRAVENOUS at 12:29

## 2024-04-24 RX ADMIN — IOPAMIDOL 75 ML: 755 INJECTION, SOLUTION INTRAVENOUS at 09:57

## 2024-04-24 RX ADMIN — SODIUM CHLORIDE 100 ML: 9 INJECTION, SOLUTION INTRAVENOUS at 09:58

## 2024-04-24 RX ADMIN — ONDANSETRON 4 MG: 2 INJECTION INTRAMUSCULAR; INTRAVENOUS at 20:09

## 2024-04-24 RX ADMIN — MAGNESIUM SULFATE HEPTAHYDRATE 2000 MG: 40 INJECTION, SOLUTION INTRAVENOUS at 01:46

## 2024-04-24 RX ADMIN — SODIUM CHLORIDE, PRESERVATIVE FREE 10 ML: 5 INJECTION INTRAVENOUS at 14:37

## 2024-04-24 RX ADMIN — CEFTRIAXONE SODIUM 1000 MG: 1 INJECTION, POWDER, FOR SOLUTION INTRAMUSCULAR; INTRAVENOUS at 16:07

## 2024-04-24 NOTE — CONSULTS
Date:   4/24/2024  Patient name: Frank Lisa  Date of admission:  4/23/2024  8:29 PM  MRN:   [id number]  YOB: 1960  PCP: Lopez Rosario PA    Reason for Admission: Hypokalemia [E87.6]  Hypomagnesemia [E83.42]  Abdominal pain, unspecified abdominal location [R10.9]  Chronic midline low back pain without sciatica [M54.50, G89.29]    Cardiology consult: Atrial fibrillation with RVR       Referring physician Dr. Sima Russo    Impression    A-fib with RVR, new onset  Liver cirrhosis, status post TIPS(transjugular intrahepatic portosystemic shunt)  History of esophageal adenocarcinoma status post radiation therapy  Multiple abdominal surgeries  Previous history of GI bleed multiple times    History of present illness    63-year-old male with a past medical history of , liver cirrhosis, hepatitis C,, alcohol abuse, status post TIPS, esophageal adenocarcinoma s/p radiation therapy 2021, right hemicolectomy, recent reversal of ileostomy with anastomosis within the past year, previous PEG tube dependent who was recently hospitalized with  symptoms of abdominal pain had a small bowel obstruction.  OG tube was placed patient's symptoms improved, diet was advanced and he was discharged.  He got readmitted 4/23/2024 with a poor balance, right leg weakness.  He is also having diarrhea ongoing for 7 to 8 days,  he was found to be having severe hypomagnesemia and hypokalemia.  ECG showed A-fib.    On admission blood pressure was 113/70, heart rate 104, temperature 98.1, oxygen saturation 96%    On admission he had hypokalemia potassium 3.2 hypomagnesemia magnesium level 1.4 and serum albumin 2.6  His hemoglobin was 11.5, WBC 6.2, platelets 103,   Troponin high-sensitivity 30, sodium 136, potassium 3.2, BUN 7, creatinine 0.8  Urine examination WBC 3-5, RBC more than 50, moderate bacteri, large hemoglobin, large ketones, trace leukocyte test    Current evaluation  Patient seen and examined  He was resting on bed

## 2024-04-24 NOTE — ED PROVIDER NOTES
(before breakfast)    MAGNESIUM 200 MG TABS TABLET    Take 1 tablet by mouth daily    MULTIPLE VITAMINS-MINERALS (CENTRUM/CERTA-LUCILLE WITH MINERALS ORAL) SOLUTION    15 mLs by Per G Tube route daily    NADOLOL (CORGARD) 20 MG TABLET    0.5 tablets by Per G Tube route daily    ONDANSETRON (ZOFRAN-ODT) 4 MG DISINTEGRATING TABLET    dissolve 1 tablet by mouth every 8 hours if needed for nausea and vomiting    POTASSIUM CHLORIDE 20 MEQ/15ML (10%) ORAL SOLUTION    Take 30 mLs by mouth daily    SUCRALFATE (CARAFATE) 1 GM TABLET    1 tablet by PEG Tube route every 8 hours for 14 days       ALLERGIES     is allergic to pollen extract.    FAMILY HISTORY     He indicated that his mother is . He indicated that his father is . He indicated that both of his sisters are alive. He indicated that his brother is alive.       SOCIAL HISTORY      reports that he quit smoking about 7 years ago. His smoking use included cigarettes. He started smoking about 52 years ago. He has a 45.0 pack-year smoking history. He has never used smokeless tobacco. He reports current alcohol use.  Frequency: 1.00 time per week. Drug: Marijuana (Weed).    PHYSICAL EXAM     INITIAL VITALS: /71   Pulse (!) 102   Temp 98.1 °F (36.7 °C) (Oral)   Resp 18   Ht 1.778 m (5' 10\")   Wt 81.6 kg (180 lb)   SpO2 93%   BMI 25.83 kg/m²   Gen: nad  Head: Normocephalic, atraumatic  Eye: Pupils equal round reactive to light, no conjunctivitis  ENT: MMM  Neck: No C-spine TTP  Heart: Regular rate and rhythm no murmurs  Lungs: Clear to auscultation bilaterally, no respiratory distress  Chest wall: No crepitus, no tenderness palpation  Abdomen: Soft, nontender, nondistended, with no peritoneal signs  MSK: No T or L-spine TTP  Neurologic: Patient is alert and oriented x3, motor and sensation is intact in all 4 extremities, fluent speech  Lower extremity strength bilaterally:   Hip Flexor / iliopsoas (L2): 5/5  Knee Extensors / quadriceps (L3):

## 2024-04-24 NOTE — H&P
[facility]   IN-PATIENT SERVICE   [facility]    HISTORY AND PHYSICAL EXAMINATION            Date:   4/24/2024  Patient name:  Frank Lias  Date of admission:  4/23/2024  8:29 PM  MRN:   [id number]  Account:  [id number]  YOB: 1960  PCP:    Lopez Rosario PA  Room:   [id number]  Code Status:    Full Code    Chief Complaint:     Chief Complaint   Patient presents with    Fall       History Obtained From:     patient    History of Present Illness:     The patient is a 63 y.o.  Non- / non  male who presents with Fall   and he is admitted to the hospital for the management of      The patient is 63-year-old male with multiple comorbidities include history of liver cirrhosis secondary to alcohol use and hepatitis C, history of TIPS, hepatitis C treated esophageal cancer, history of dysphagia s/p PEG follows up with oncology currently in remission , recently had EGD on 1/9/20/2024 did not show any evidence of recurrence,, presented to the ER with generalized weakness and fatigue and difficulty walking.  Patient states that he feels that he has some electrolyte abnormalities, patient does get hypomagnesemia and hypokalemia, patient states that he feels like his potassium and magnesium are getting too low that is causing him difficulty walking and falls,  Patient states that he is at couple of falls lately,  Came to ER, was found to be mildly hypokalemic and hypomagnesemic, electrolytes replaced, patient had MRI ordered, Dr. Cueva was already consulted from the ER  Patient was seen and examined on the floor, found to be in A-fib with RVR initially with a heart rate in 120,  New onset  Patient complaining of dizziness generalized weakness and fatigue and difficulty walking, attributing symptoms to underlying electrolyte abnormality, no palpitations chest pain or shortness of breath    Past Medical History:     Past Medical History:   Diagnosis Date    Abnormal EKG

## 2024-04-25 ENCOUNTER — APPOINTMENT (OUTPATIENT)
Age: 64
DRG: 640 | End: 2024-04-25
Attending: INTERNAL MEDICINE
Payer: COMMERCIAL

## 2024-04-25 ENCOUNTER — APPOINTMENT (OUTPATIENT)
Dept: GENERAL RADIOLOGY | Age: 64
DRG: 640 | End: 2024-04-25
Payer: COMMERCIAL

## 2024-04-25 PROBLEM — M25.571 PAIN IN JOINT INVOLVING RIGHT ANKLE AND FOOT: Status: ACTIVE | Noted: 2024-04-25

## 2024-04-25 PROBLEM — M19.039 WRIST ARTHRITIS: Status: ACTIVE | Noted: 2024-04-25

## 2024-04-25 LAB
AMMONIA PLAS-SCNC: 41 UMOL/L (ref 16–60)
AMPHET UR QL SCN: NEGATIVE
ANION GAP SERPL CALCULATED.3IONS-SCNC: 9 MMOL/L (ref 9–17)
BARBITURATES UR QL SCN: NEGATIVE
BASOPHILS # BLD: 0 K/UL (ref 0–0.2)
BASOPHILS NFR BLD: 1 % (ref 0–2)
BENZODIAZ UR QL: NEGATIVE
BUN SERPL-MCNC: 11 MG/DL (ref 8–23)
CALCIUM SERPL-MCNC: 8.3 MG/DL (ref 8.6–10.4)
CANNABINOIDS UR QL SCN: POSITIVE
CHLORIDE SERPL-SCNC: 100 MMOL/L (ref 98–107)
CO2 SERPL-SCNC: 26 MMOL/L (ref 20–31)
COCAINE UR QL SCN: NEGATIVE
CREAT SERPL-MCNC: 0.8 MG/DL (ref 0.7–1.2)
ECHO AO ROOT DIAM: 3.4 CM
ECHO AO ROOT INDEX: 1.7 CM/M2
ECHO AV AREA PEAK VELOCITY: 3.4 CM2
ECHO AV AREA VTI: 3.7 CM2
ECHO AV AREA/BSA PEAK VELOCITY: 1.7 CM2/M2
ECHO AV AREA/BSA VTI: 1.9 CM2/M2
ECHO AV MEAN GRADIENT: 4 MMHG
ECHO AV MEAN VELOCITY: 0.9 M/S
ECHO AV PEAK GRADIENT: 7 MMHG
ECHO AV PEAK VELOCITY: 1.3 M/S
ECHO AV VELOCITY RATIO: 0.85
ECHO AV VTI: 25.9 CM
ECHO BSA: 2.01 M2
ECHO EST RA PRESSURE: 3 MMHG
ECHO LA AREA 2C: 18.6 CM2
ECHO LA AREA 4C: 24.1 CM2
ECHO LA DIAMETER INDEX: 2.45 CM/M2
ECHO LA DIAMETER: 4.9 CM
ECHO LA MAJOR AXIS: 7 CM
ECHO LA MINOR AXIS: 5.4 CM
ECHO LA TO AORTIC ROOT RATIO: 1.44
ECHO LA VOL MOD A2C: 52 ML (ref 18–58)
ECHO LA VOL MOD A4C: 69 ML (ref 18–58)
ECHO LA VOLUME INDEX MOD A2C: 26 ML/M2 (ref 16–34)
ECHO LA VOLUME INDEX MOD A4C: 35 ML/M2 (ref 16–34)
ECHO LV E' LATERAL VELOCITY: 13 CM/S
ECHO LV E' SEPTAL VELOCITY: 9 CM/S
ECHO LV FRACTIONAL SHORTENING: 33 % (ref 28–44)
ECHO LV INTERNAL DIMENSION DIASTOLE INDEX: 2.3 CM/M2
ECHO LV INTERNAL DIMENSION DIASTOLIC: 4.6 CM (ref 4.2–5.9)
ECHO LV INTERNAL DIMENSION SYSTOLIC INDEX: 1.55 CM/M2
ECHO LV INTERNAL DIMENSION SYSTOLIC: 3.1 CM
ECHO LV IVSD: 1.5 CM (ref 0.6–1)
ECHO LV MASS 2D: 284.8 G (ref 88–224)
ECHO LV MASS INDEX 2D: 142.4 G/M2 (ref 49–115)
ECHO LV POSTERIOR WALL DIASTOLIC: 1.5 CM (ref 0.6–1)
ECHO LV RELATIVE WALL THICKNESS RATIO: 0.65
ECHO LVOT AREA: 4.2 CM2
ECHO LVOT AV VTI INDEX: 0.89
ECHO LVOT DIAM: 2.3 CM
ECHO LVOT MEAN GRADIENT: 3 MMHG
ECHO LVOT PEAK GRADIENT: 5 MMHG
ECHO LVOT PEAK VELOCITY: 1.1 M/S
ECHO LVOT STROKE VOLUME INDEX: 48 ML/M2
ECHO LVOT SV: 95.9 ML
ECHO LVOT VTI: 23.1 CM
ECHO MV E DECELERATION TIME (DT): 184 MS
ECHO MV E VELOCITY: 1.3 M/S
ECHO MV E/E' LATERAL: 10
ECHO MV E/E' RATIO (AVERAGED): 12.22
ECHO RA AREA 4C: 17.4 CM2
ECHO RA END SYSTOLIC VOLUME APICAL 4 CHAMBER INDEX BSA: 21 ML/M2
ECHO RA VOLUME: 42 ML
ECHO RIGHT VENTRICULAR SYSTOLIC PRESSURE (RVSP): 15 MMHG
ECHO RV TAPSE: 2.1 CM (ref 1.7–?)
ECHO TV REGURGITANT MAX VELOCITY: 1.76 M/S
ECHO TV REGURGITANT PEAK GRADIENT: 12 MMHG
EKG ATRIAL RATE: 97 BPM
EKG Q-T INTERVAL: 378 MS
EKG QRS DURATION: 80 MS
EKG QTC CALCULATION (BAZETT): 480 MS
EKG R AXIS: 23 DEGREES
EKG T AXIS: -12 DEGREES
EKG VENTRICULAR RATE: 97 BPM
EOSINOPHIL # BLD: 0.2 K/UL (ref 0–0.4)
EOSINOPHILS RELATIVE PERCENT: 3 % (ref 0–4)
ERYTHROCYTE [DISTWIDTH] IN BLOOD BY AUTOMATED COUNT: 14.3 % (ref 11.5–14.9)
FENTANYL UR QL: NEGATIVE
GFR SERPL CREATININE-BSD FRML MDRD: >90 ML/MIN/1.73M2
GLUCOSE BLD-MCNC: 101 MG/DL (ref 75–110)
GLUCOSE BLD-MCNC: 130 MG/DL (ref 75–110)
GLUCOSE BLD-MCNC: 133 MG/DL (ref 75–110)
GLUCOSE BLD-MCNC: 156 MG/DL (ref 75–110)
GLUCOSE SERPL-MCNC: 130 MG/DL (ref 70–99)
HCT VFR BLD AUTO: 26.4 % (ref 41–53)
HGB BLD-MCNC: 8.7 G/DL (ref 13.5–17.5)
LYMPHOCYTES NFR BLD: 0.6 K/UL (ref 1–4.8)
LYMPHOCYTES RELATIVE PERCENT: 11 % (ref 24–44)
MAGNESIUM SERPL-MCNC: 1.4 MG/DL (ref 1.6–2.6)
MCH RBC QN AUTO: 31.5 PG (ref 26–34)
MCHC RBC AUTO-ENTMCNC: 32.9 G/DL (ref 31–37)
MCV RBC AUTO: 95.7 FL (ref 80–100)
METHADONE UR QL: NEGATIVE
MONOCYTES NFR BLD: 1 K/UL (ref 0.1–1.3)
MONOCYTES NFR BLD: 16 % (ref 1–7)
NEUTROPHILS NFR BLD: 69 % (ref 36–66)
NEUTS SEG NFR BLD: 4.1 K/UL (ref 1.3–9.1)
OPIATES UR QL SCN: NEGATIVE
OXYCODONE UR QL SCN: NEGATIVE
PCP UR QL SCN: NEGATIVE
PLATELET # BLD AUTO: 80 K/UL (ref 150–450)
PMV BLD AUTO: 8.2 FL (ref 6–12)
POTASSIUM SERPL-SCNC: 3.2 MMOL/L (ref 3.7–5.3)
RBC # BLD AUTO: 2.76 M/UL (ref 4.5–5.9)
SODIUM SERPL-SCNC: 135 MMOL/L (ref 135–144)
TEST INFORMATION: ABNORMAL
WBC OTHER # BLD: 5.9 K/UL (ref 3.5–11)

## 2024-04-25 PROCEDURE — 6360000002 HC RX W HCPCS: Performed by: INTERNAL MEDICINE

## 2024-04-25 PROCEDURE — 93306 TTE W/DOPPLER COMPLETE: CPT | Performed by: INTERNAL MEDICINE

## 2024-04-25 PROCEDURE — 83036 HEMOGLOBIN GLYCOSYLATED A1C: CPT

## 2024-04-25 PROCEDURE — 99223 1ST HOSP IP/OBS HIGH 75: CPT | Performed by: PSYCHIATRY & NEUROLOGY

## 2024-04-25 PROCEDURE — 86038 ANTINUCLEAR ANTIBODIES: CPT

## 2024-04-25 PROCEDURE — 73600 X-RAY EXAM OF ANKLE: CPT

## 2024-04-25 PROCEDURE — 6370000000 HC RX 637 (ALT 250 FOR IP): Performed by: INTERNAL MEDICINE

## 2024-04-25 PROCEDURE — 85025 COMPLETE CBC W/AUTO DIFF WBC: CPT

## 2024-04-25 PROCEDURE — 84165 PROTEIN E-PHORESIS SERUM: CPT

## 2024-04-25 PROCEDURE — 93010 ELECTROCARDIOGRAM REPORT: CPT | Performed by: INTERNAL MEDICINE

## 2024-04-25 PROCEDURE — 86235 NUCLEAR ANTIGEN ANTIBODY: CPT

## 2024-04-25 PROCEDURE — 93306 TTE W/DOPPLER COMPLETE: CPT

## 2024-04-25 PROCEDURE — 73120 X-RAY EXAM OF HAND: CPT

## 2024-04-25 PROCEDURE — 2580000003 HC RX 258: Performed by: INTERNAL MEDICINE

## 2024-04-25 PROCEDURE — 82746 ASSAY OF FOLIC ACID SERUM: CPT

## 2024-04-25 PROCEDURE — 80048 BASIC METABOLIC PNL TOTAL CA: CPT

## 2024-04-25 PROCEDURE — 84166 PROTEIN E-PHORESIS/URINE/CSF: CPT

## 2024-04-25 PROCEDURE — 36415 COLL VENOUS BLD VENIPUNCTURE: CPT

## 2024-04-25 PROCEDURE — 84156 ASSAY OF PROTEIN URINE: CPT

## 2024-04-25 PROCEDURE — 73620 X-RAY EXAM OF FOOT: CPT

## 2024-04-25 PROCEDURE — 97162 PT EVAL MOD COMPLEX 30 MIN: CPT

## 2024-04-25 PROCEDURE — 97116 GAIT TRAINING THERAPY: CPT

## 2024-04-25 PROCEDURE — 86780 TREPONEMA PALLIDUM: CPT

## 2024-04-25 PROCEDURE — 82947 ASSAY GLUCOSE BLOOD QUANT: CPT

## 2024-04-25 PROCEDURE — 84155 ASSAY OF PROTEIN SERUM: CPT

## 2024-04-25 PROCEDURE — 80307 DRUG TEST PRSMV CHEM ANLYZR: CPT

## 2024-04-25 PROCEDURE — 82607 VITAMIN B-12: CPT

## 2024-04-25 PROCEDURE — 1200000000 HC SEMI PRIVATE

## 2024-04-25 PROCEDURE — 97530 THERAPEUTIC ACTIVITIES: CPT

## 2024-04-25 PROCEDURE — 97166 OT EVAL MOD COMPLEX 45 MIN: CPT

## 2024-04-25 PROCEDURE — 99233 SBSQ HOSP IP/OBS HIGH 50: CPT | Performed by: INTERNAL MEDICINE

## 2024-04-25 PROCEDURE — 86334 IMMUNOFIX E-PHORESIS SERUM: CPT

## 2024-04-25 PROCEDURE — 82140 ASSAY OF AMMONIA: CPT

## 2024-04-25 PROCEDURE — 83735 ASSAY OF MAGNESIUM: CPT

## 2024-04-25 PROCEDURE — 86225 DNA ANTIBODY NATIVE: CPT

## 2024-04-25 RX ADMIN — DULOXETINE HYDROCHLORIDE 30 MG: 30 CAPSULE, DELAYED RELEASE ORAL at 09:02

## 2024-04-25 RX ADMIN — NADOLOL 10 MG: 20 TABLET ORAL at 09:04

## 2024-04-25 RX ADMIN — ONDANSETRON 4 MG: 2 INJECTION INTRAMUSCULAR; INTRAVENOUS at 03:55

## 2024-04-25 RX ADMIN — FERROUS SULFATE TAB 325 MG (65 MG ELEMENTAL FE) 325 MG: 325 (65 FE) TAB at 21:16

## 2024-04-25 RX ADMIN — ONDANSETRON 4 MG: 2 INJECTION INTRAMUSCULAR; INTRAVENOUS at 18:27

## 2024-04-25 RX ADMIN — METOPROLOL TARTRATE 25 MG: 25 TABLET, FILM COATED ORAL at 21:16

## 2024-04-25 RX ADMIN — LACTULOSE 30 G: 20 SOLUTION ORAL at 09:03

## 2024-04-25 RX ADMIN — LACTULOSE 30 G: 20 SOLUTION ORAL at 14:59

## 2024-04-25 RX ADMIN — SUCRALFATE 1 G: 1 TABLET ORAL at 14:58

## 2024-04-25 RX ADMIN — SUCRALFATE 1 G: 1 TABLET ORAL at 07:30

## 2024-04-25 RX ADMIN — FERROUS SULFATE TAB 325 MG (65 MG ELEMENTAL FE) 325 MG: 325 (65 FE) TAB at 09:02

## 2024-04-25 RX ADMIN — SODIUM CHLORIDE: 9 INJECTION, SOLUTION INTRAVENOUS at 13:10

## 2024-04-25 RX ADMIN — CEFTRIAXONE SODIUM 1000 MG: 1 INJECTION, POWDER, FOR SOLUTION INTRAMUSCULAR; INTRAVENOUS at 15:01

## 2024-04-25 RX ADMIN — Medication 200 MG: at 09:02

## 2024-04-25 RX ADMIN — SUCRALFATE 1 G: 1 TABLET ORAL at 21:16

## 2024-04-25 RX ADMIN — ENOXAPARIN SODIUM 40 MG: 100 INJECTION SUBCUTANEOUS at 09:05

## 2024-04-25 RX ADMIN — POTASSIUM CHLORIDE 40 MEQ: 1500 TABLET, EXTENDED RELEASE ORAL at 11:39

## 2024-04-25 ASSESSMENT — PAIN SCALES - GENERAL: PAINLEVEL_OUTOF10: 0

## 2024-04-25 NOTE — CONSULTS
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  Neurology consulted for altered mentation.  History obtained from patient and chart review. Patient is a poor historian.    Patient states that when he has electrolyte abnormalities, particularly hypomagnesemia and hypokalemia, he feels overall fatigue, dizzy (described as positional lightheadedness), gait imbalance and has had falls. This occurs when he gets dehydrated (at times after frequent bowel movements from lactulose).  At baseline, he does not use an assistive device although when he does have these electrolyte abnormalities, he has to use a walker.  He states the symptoms can sometimes last for days.  He has baseline peripheral polyneuropathy, he was told this was chemotherapy-induced since onset was after he received chemotherapy for esophageal cancer (2021).  He also has history of ulnar neuropathy along with cervical radiculopathy.  He was recently seen by Dr. Loaiza, neurology, outpatient.  At baseline, he lives by himself, drives, manages his own finances.  Reports some episodes of mild forgetfulness but otherwise remains independent in ADLs.  He denies smoking, has prior history of alcohol use-none currently, no drug use.  No recent changes in medications.    In the ED, he had hypokalemia and hypomagnesemia.  MRI of the spine was obtained-no acute findings.

## 2024-04-25 NOTE — CONSULTS
Inpatient consult to Orthopedic Surgery  Consult performed by: Júnior Cueva MD  Consult ordered by: Quinn Cage MD        Patient: Frank Lisa  Unit/Bed: 2060/2060-01  YOB: 1960  MRN: [id number]  Acct: [id number]   Admitting Diagnosis: Hypokalemia [E87.6]  Hypomagnesemia [E83.42]  Abdominal pain, unspecified abdominal location [R10.9]  Chronic midline low back pain without sciatica [M54.50, G89.29]  Admit Date:  4/23/2024  Hospital Day: 1    Subjective:    Patient is having problems with  dizzy syncopal episodes difficulty finding words  Fall    New onset dizzines syncopal typed episodes with fall and difficulty finding words    Also new pain right foot and ankle     Chronic hand intrinsic wasting        Patient Seen, Chart, Labs, Radiologystudies, and Consults reviewed.    Objective:   BP (!) 116/53   Pulse (!) 101   Temp 99 °F (37.2 °C)   Resp 16   Ht 1.778 m (5' 10\")   Wt 81.6 kg (180 lb)   SpO2 98%   BMI 25.83 kg/m²   Intake/Output Summary (Last 24 hours) at 4/25/2024 1240  Last data filed at 4/25/2024 1148  Gross per 24 hour   Intake --   Output 600 ml   Net -600 ml     Review of Systems  Physical Exam  Vitals and nursing note reviewed.   Constitutional:       Appearance: He is well-developed.   HENT:      Head: Normocephalic and atraumatic.      Nose: Nose normal.   Eyes:      Conjunctiva/sclera: Conjunctivae normal.   Pulmonary:      Effort: Pulmonary effort is normal. No respiratory distress.   Musculoskeletal:      Cervical back: Normal range of motion and neck supple.      Comments: Normal gait     Skin:     General: Skin is warm and dry.   Neurological:      Mental Status: He is alert and oriented to person, place, and time.      Sensory: No sensory deficit.   Psychiatric:         Behavior: Behavior normal.         Thought Content: Thought content normal.     Bilateral intrinsic wasting able to weakly cross fingers dysesthesia greatest in ulnar     Right great toe and

## 2024-04-26 ENCOUNTER — TELEPHONE (OUTPATIENT)
Dept: PHARMACY | Age: 64
End: 2024-04-26

## 2024-04-26 ENCOUNTER — APPOINTMENT (OUTPATIENT)
Dept: MRI IMAGING | Age: 64
DRG: 640 | End: 2024-04-26
Payer: COMMERCIAL

## 2024-04-26 LAB
ANION GAP SERPL CALCULATED.3IONS-SCNC: 11 MMOL/L (ref 9–17)
BASOPHILS # BLD: 0 K/UL (ref 0–0.2)
BASOPHILS NFR BLD: 0 % (ref 0–2)
BUN SERPL-MCNC: 8 MG/DL (ref 8–23)
CALCIUM SERPL-MCNC: 8.5 MG/DL (ref 8.6–10.4)
CHLORIDE SERPL-SCNC: 101 MMOL/L (ref 98–107)
CO2 SERPL-SCNC: 24 MMOL/L (ref 20–31)
CREAT SERPL-MCNC: 0.9 MG/DL (ref 0.7–1.2)
EOSINOPHIL # BLD: 0.12 K/UL (ref 0–0.4)
EOSINOPHILS RELATIVE PERCENT: 2 % (ref 0–4)
ERYTHROCYTE [DISTWIDTH] IN BLOOD BY AUTOMATED COUNT: 14.6 % (ref 11.5–14.9)
EST. AVERAGE GLUCOSE BLD GHB EST-MCNC: 68 MG/DL
FOLATE SERPL-MCNC: 17.3 NG/ML (ref 4.8–24.2)
GFR SERPL CREATININE-BSD FRML MDRD: >90 ML/MIN/1.73M2
GLUCOSE SERPL-MCNC: 105 MG/DL (ref 70–99)
HBA1C MFR BLD: 4 % (ref 4–6)
HCT VFR BLD AUTO: 27.6 % (ref 41–53)
HGB BLD-MCNC: 9.1 G/DL (ref 13.5–17.5)
LYMPHOCYTES NFR BLD: 0.67 K/UL (ref 1–4.8)
LYMPHOCYTES RELATIVE PERCENT: 11 % (ref 24–44)
MAGNESIUM SERPL-MCNC: 1.4 MG/DL (ref 1.6–2.6)
MAGNESIUM SERPL-MCNC: 2 MG/DL (ref 1.6–2.6)
MCH RBC QN AUTO: 31.7 PG (ref 26–34)
MCHC RBC AUTO-ENTMCNC: 32.9 G/DL (ref 31–37)
MCV RBC AUTO: 96.2 FL (ref 80–100)
MONOCYTES NFR BLD: 0.85 K/UL (ref 0.1–1.3)
MONOCYTES NFR BLD: 14 % (ref 1–7)
MORPHOLOGY: NORMAL
NEUTROPHILS NFR BLD: 73 % (ref 36–66)
NEUTS SEG NFR BLD: 4.46 K/UL (ref 1.3–9.1)
PLATELET # BLD AUTO: 89 K/UL (ref 150–450)
PMV BLD AUTO: 7.5 FL (ref 6–12)
POTASSIUM SERPL-SCNC: 3.1 MMOL/L (ref 3.7–5.3)
RBC # BLD AUTO: 2.87 M/UL (ref 4.5–5.9)
SODIUM SERPL-SCNC: 136 MMOL/L (ref 135–144)
T PALLIDUM AB SER QL IA: NONREACTIVE
VIT B12 SERPL-MCNC: 1921 PG/ML (ref 232–1245)
WBC OTHER # BLD: 6.1 K/UL (ref 3.5–11)

## 2024-04-26 PROCEDURE — 2500000003 HC RX 250 WO HCPCS: Performed by: INTERNAL MEDICINE

## 2024-04-26 PROCEDURE — 1200000000 HC SEMI PRIVATE

## 2024-04-26 PROCEDURE — 99233 SBSQ HOSP IP/OBS HIGH 50: CPT | Performed by: PSYCHIATRY & NEUROLOGY

## 2024-04-26 PROCEDURE — 6360000002 HC RX W HCPCS: Performed by: NURSE PRACTITIONER

## 2024-04-26 PROCEDURE — 70551 MRI BRAIN STEM W/O DYE: CPT

## 2024-04-26 PROCEDURE — 2580000003 HC RX 258: Performed by: INTERNAL MEDICINE

## 2024-04-26 PROCEDURE — 6370000000 HC RX 637 (ALT 250 FOR IP): Performed by: INTERNAL MEDICINE

## 2024-04-26 PROCEDURE — 6360000002 HC RX W HCPCS: Performed by: INTERNAL MEDICINE

## 2024-04-26 PROCEDURE — 85025 COMPLETE CBC W/AUTO DIFF WBC: CPT

## 2024-04-26 PROCEDURE — 83735 ASSAY OF MAGNESIUM: CPT

## 2024-04-26 PROCEDURE — 97535 SELF CARE MNGMENT TRAINING: CPT

## 2024-04-26 PROCEDURE — 80048 BASIC METABOLIC PNL TOTAL CA: CPT

## 2024-04-26 PROCEDURE — 36415 COLL VENOUS BLD VENIPUNCTURE: CPT

## 2024-04-26 PROCEDURE — 99233 SBSQ HOSP IP/OBS HIGH 50: CPT | Performed by: INTERNAL MEDICINE

## 2024-04-26 RX ORDER — LORAZEPAM 1 MG/1
4 TABLET ORAL
Status: DISCONTINUED | OUTPATIENT
Start: 2024-04-26 | End: 2024-05-02 | Stop reason: HOSPADM

## 2024-04-26 RX ORDER — HALOPERIDOL 5 MG/ML
5 INJECTION INTRAMUSCULAR ONCE
Status: COMPLETED | OUTPATIENT
Start: 2024-04-26 | End: 2024-04-26

## 2024-04-26 RX ORDER — LORAZEPAM 1 MG/1
3 TABLET ORAL
Status: DISCONTINUED | OUTPATIENT
Start: 2024-04-26 | End: 2024-05-02 | Stop reason: HOSPADM

## 2024-04-26 RX ORDER — LORAZEPAM 1 MG/1
1 TABLET ORAL
Status: DISCONTINUED | OUTPATIENT
Start: 2024-04-26 | End: 2024-05-02 | Stop reason: HOSPADM

## 2024-04-26 RX ORDER — LORAZEPAM 0.5 MG/1
0.5 TABLET ORAL
Status: COMPLETED | OUTPATIENT
Start: 2024-04-26 | End: 2024-04-26

## 2024-04-26 RX ORDER — GAUZE BANDAGE 2" X 2"
100 BANDAGE TOPICAL DAILY
Status: DISCONTINUED | OUTPATIENT
Start: 2024-04-26 | End: 2024-05-02 | Stop reason: HOSPADM

## 2024-04-26 RX ORDER — LORAZEPAM 2 MG/ML
3 INJECTION INTRAMUSCULAR
Status: DISCONTINUED | OUTPATIENT
Start: 2024-04-26 | End: 2024-05-02 | Stop reason: HOSPADM

## 2024-04-26 RX ORDER — LORAZEPAM 2 MG/ML
4 INJECTION INTRAMUSCULAR
Status: DISCONTINUED | OUTPATIENT
Start: 2024-04-26 | End: 2024-05-02 | Stop reason: HOSPADM

## 2024-04-26 RX ORDER — LORAZEPAM 2 MG/ML
2 INJECTION INTRAMUSCULAR
Status: DISCONTINUED | OUTPATIENT
Start: 2024-04-26 | End: 2024-05-02 | Stop reason: HOSPADM

## 2024-04-26 RX ORDER — DIPHENHYDRAMINE HYDROCHLORIDE 50 MG/ML
50 INJECTION INTRAMUSCULAR; INTRAVENOUS ONCE
Status: COMPLETED | OUTPATIENT
Start: 2024-04-26 | End: 2024-04-26

## 2024-04-26 RX ORDER — HALOPERIDOL 5 MG/ML
2 INJECTION INTRAMUSCULAR ONCE
Status: DISCONTINUED | OUTPATIENT
Start: 2024-04-26 | End: 2024-05-02 | Stop reason: HOSPADM

## 2024-04-26 RX ORDER — LOSARTAN POTASSIUM 50 MG/1
50 TABLET ORAL DAILY
Status: DISCONTINUED | OUTPATIENT
Start: 2024-04-26 | End: 2024-04-28

## 2024-04-26 RX ORDER — LORAZEPAM 2 MG/ML
1 INJECTION INTRAMUSCULAR
Status: DISCONTINUED | OUTPATIENT
Start: 2024-04-26 | End: 2024-05-02 | Stop reason: HOSPADM

## 2024-04-26 RX ORDER — HALOPERIDOL 5 MG/ML
5 INJECTION INTRAMUSCULAR EVERY 6 HOURS PRN
Status: DISCONTINUED | OUTPATIENT
Start: 2024-04-26 | End: 2024-05-02 | Stop reason: HOSPADM

## 2024-04-26 RX ORDER — METOPROLOL TARTRATE 50 MG/1
50 TABLET, FILM COATED ORAL 2 TIMES DAILY
Status: DISCONTINUED | OUTPATIENT
Start: 2024-04-26 | End: 2024-05-02 | Stop reason: HOSPADM

## 2024-04-26 RX ORDER — HALOPERIDOL 5 MG/ML
2 INJECTION INTRAMUSCULAR ONCE
Status: COMPLETED | OUTPATIENT
Start: 2024-04-26 | End: 2024-04-26

## 2024-04-26 RX ORDER — LORAZEPAM 2 MG/ML
2 INJECTION INTRAMUSCULAR ONCE
Status: COMPLETED | OUTPATIENT
Start: 2024-04-26 | End: 2024-04-26

## 2024-04-26 RX ORDER — FOLIC ACID 1 MG/1
1 TABLET ORAL DAILY
Status: DISCONTINUED | OUTPATIENT
Start: 2024-04-26 | End: 2024-05-02 | Stop reason: HOSPADM

## 2024-04-26 RX ORDER — LORAZEPAM 1 MG/1
2 TABLET ORAL
Status: DISCONTINUED | OUTPATIENT
Start: 2024-04-26 | End: 2024-05-02 | Stop reason: HOSPADM

## 2024-04-26 RX ADMIN — LORAZEPAM 0.5 MG: 0.5 TABLET ORAL at 09:59

## 2024-04-26 RX ADMIN — DULOXETINE HYDROCHLORIDE 30 MG: 30 CAPSULE, DELAYED RELEASE ORAL at 08:31

## 2024-04-26 RX ADMIN — HALOPERIDOL LACTATE 5 MG: 5 INJECTION, SOLUTION INTRAMUSCULAR at 19:15

## 2024-04-26 RX ADMIN — LOSARTAN POTASSIUM 50 MG: 50 TABLET, FILM COATED ORAL at 16:59

## 2024-04-26 RX ADMIN — ONDANSETRON 4 MG: 2 INJECTION INTRAMUSCULAR; INTRAVENOUS at 01:54

## 2024-04-26 RX ADMIN — LORAZEPAM 3 MG: 2 INJECTION INTRAMUSCULAR; INTRAVENOUS at 23:51

## 2024-04-26 RX ADMIN — POTASSIUM CHLORIDE: 2 INJECTION, SOLUTION, CONCENTRATE INTRAVENOUS at 09:54

## 2024-04-26 RX ADMIN — METOPROLOL TARTRATE 25 MG: 25 TABLET, FILM COATED ORAL at 08:32

## 2024-04-26 RX ADMIN — MAGNESIUM SULFATE HEPTAHYDRATE 2000 MG: 40 INJECTION, SOLUTION INTRAVENOUS at 08:57

## 2024-04-26 RX ADMIN — DIPHENHYDRAMINE HYDROCHLORIDE 50 MG: 50 INJECTION INTRAMUSCULAR; INTRAVENOUS at 19:14

## 2024-04-26 RX ADMIN — LORAZEPAM 2 MG: 2 INJECTION INTRAMUSCULAR; INTRAVENOUS at 19:15

## 2024-04-26 RX ADMIN — SUCRALFATE 1 G: 1 TABLET ORAL at 06:00

## 2024-04-26 RX ADMIN — CEFTRIAXONE SODIUM 1000 MG: 1 INJECTION, POWDER, FOR SOLUTION INTRAMUSCULAR; INTRAVENOUS at 14:19

## 2024-04-26 RX ADMIN — HALOPERIDOL LACTATE 2 MG: 5 INJECTION, SOLUTION INTRAMUSCULAR at 13:02

## 2024-04-26 RX ADMIN — ENOXAPARIN SODIUM 40 MG: 100 INJECTION SUBCUTANEOUS at 08:32

## 2024-04-26 RX ADMIN — FERROUS SULFATE TAB 325 MG (65 MG ELEMENTAL FE) 325 MG: 325 (65 FE) TAB at 08:31

## 2024-04-26 RX ADMIN — HALOPERIDOL LACTATE 5 MG: 5 INJECTION, SOLUTION INTRAMUSCULAR at 15:18

## 2024-04-26 RX ADMIN — Medication 200 MG: at 08:31

## 2024-04-26 NOTE — DISCHARGE INSTR - COC
Continuity of Care Form    Patient Name: Frank Lisa   :  1960  MRN:  [id number]    Admit date:  2024  Discharge date:  2024    Code Status Order: Full Code   Advance Directives:     Admitting Physician:  Kathi Russo MD  PCP: Lopez Rosario PA    Discharging Nurse: ada Mercado Hospital Unit/Room#: -  Discharging Unit Phone Number: [phone]    Emergency Contact:   Extended Emergency Contact Information  Primary Emergency Contact: Nilam Lisa  Address: [address] [place] [address]  Home Phone: [phone]  Work Phone: [phone]  Mobile Phone: [phone]  Relation: Ex-Spouse    Past Surgical History:  Past Surgical History:   Procedure Laterality Date    BUNIONECTOMY      twice on right side    BUNIONECTOMY Left     CARPAL TUNNEL RELEASE Right     COLON SURGERY  10/10/2023    EXPLORATORY LAPAROTOMY, REVERSAL ILEOSTOMY WITH ILEOCOLOSTOMY, LYSIS OF ADHESIONS,    COLONOSCOPY      at age 39    COLONOSCOPY  10/05/2016    polyps-pathology tubular adenoma, and abnormal looking mucosa right colon-pathology-tubular adenoma    COLONOSCOPY N/A 2018    COLONOSCOPY POLYPECTOMY COLD BIOPSY performed by Augusto Cordova MD at [facility] OR    COLONOSCOPY  2018    Small polyp in the sigmoid colon and excised with biopsy forceps--tubular adenoma    COLONOSCOPY N/A 2022    COLONOSCOPY POLYPECTOMY performed by Augusto Cordova MD at [facility] OR    ENDOSCOPY, COLON, DIAGNOSTIC      EGD    ESOPHAGOGASTRODUODENOSCOPY  2022    ESOPHAGOGASTRODUODENOSCOPY N/A 2023    IR PORT PLACEMENT EQUAL OR GREATER THAN 5 YEARS  2021    IR PORT PLACEMENT EQUAL OR GREATER THAN 5 YEARS 2021 [facility] SPECIAL PROCEDURES    IR TIPS INSERTION  2022    IR TIPS INSERTION 2022 Scar VELEZ MD [facility] SPECIAL PROCEDURES    KNEE SURGERY Left     cyst removed    LAPAROTOMY N/A 2023    LAPAROTOMY EXPLORATORY, RIGHT COLECTOMY, CREATION IF END

## 2024-04-26 NOTE — TELEPHONE ENCOUNTER
Patient scheduled for CMR today  Currently hospitalized  Will call patient post discharge to set up appt

## 2024-04-27 LAB
ALBUMIN PERCENT: 51 % (ref 45–65)
ALBUMIN SERPL-MCNC: 2.9 G/DL (ref 3.2–5.2)
ALPHA 2 PERCENT: 9 % (ref 6–13)
ALPHA1 GLOB SERPL ELPH-MCNC: 0.2 G/DL (ref 0.1–0.4)
ALPHA1 GLOB SERPL ELPH-MCNC: 3 % (ref 3–6)
ALPHA2 GLOB SERPL ELPH-MCNC: 0.5 G/DL (ref 0.5–0.9)
ANA SER QL IA: POSITIVE
B-GLOBULIN SERPL ELPH-MCNC: 0.8 G/DL (ref 0.7–1.4)
B-GLOBULIN SERPL ELPH-MCNC: 14 % (ref 11–19)
DSDNA IGG SER QL IA: 53 IU/ML
GAMMA GLOB SERPL ELPH-MCNC: 1.4 G/DL (ref 0.5–1.5)
GAMMA GLOBULIN %: 24 % (ref 9–20)
INTERPRETATION SERPL IFE-IMP: NORMAL
NUCLEAR IGG SER IA-RTO: 0.2 U/ML
P E INTERPRETATION, U: NORMAL
PATH REV: NORMAL
PATHOLOGIST: ABNORMAL
PATHOLOGIST: NORMAL
POTASSIUM SERPL-SCNC: 3.1 MMOL/L (ref 3.7–5.3)
PROT PATTERN SERPL ELPH-IMP: ABNORMAL
PROT SERPL-MCNC: 5.7 G/DL (ref 6.6–8.7)
SPECIMEN TYPE: NORMAL
TOTAL PROT. SUM,%: 101 % (ref 98–102)
TOTAL PROT. SUM: 5.8 G/DL (ref 6.3–8.2)
URINE TOTAL PROTEIN: 12 MG/DL

## 2024-04-27 PROCEDURE — 6370000000 HC RX 637 (ALT 250 FOR IP): Performed by: INTERNAL MEDICINE

## 2024-04-27 PROCEDURE — 2580000003 HC RX 258: Performed by: INTERNAL MEDICINE

## 2024-04-27 PROCEDURE — 84132 ASSAY OF SERUM POTASSIUM: CPT

## 2024-04-27 PROCEDURE — 1200000000 HC SEMI PRIVATE

## 2024-04-27 PROCEDURE — 6360000002 HC RX W HCPCS: Performed by: NURSE PRACTITIONER

## 2024-04-27 PROCEDURE — 6370000000 HC RX 637 (ALT 250 FOR IP): Performed by: PSYCHIATRY & NEUROLOGY

## 2024-04-27 PROCEDURE — 6370000000 HC RX 637 (ALT 250 FOR IP): Performed by: NURSE PRACTITIONER

## 2024-04-27 PROCEDURE — 84425 ASSAY OF VITAMIN B-1: CPT

## 2024-04-27 PROCEDURE — 6360000002 HC RX W HCPCS: Performed by: INTERNAL MEDICINE

## 2024-04-27 PROCEDURE — 99233 SBSQ HOSP IP/OBS HIGH 50: CPT | Performed by: INTERNAL MEDICINE

## 2024-04-27 PROCEDURE — 99232 SBSQ HOSP IP/OBS MODERATE 35: CPT | Performed by: PSYCHIATRY & NEUROLOGY

## 2024-04-27 PROCEDURE — 36415 COLL VENOUS BLD VENIPUNCTURE: CPT

## 2024-04-27 RX ORDER — LACTOBACILLUS RHAMNOSUS GG 10B CELL
1 CAPSULE ORAL
Status: DISCONTINUED | OUTPATIENT
Start: 2024-04-27 | End: 2024-05-02 | Stop reason: HOSPADM

## 2024-04-27 RX ORDER — SPIRONOLACTONE 25 MG/1
25 TABLET ORAL DAILY
Status: DISCONTINUED | OUTPATIENT
Start: 2024-04-27 | End: 2024-05-02 | Stop reason: HOSPADM

## 2024-04-27 RX ORDER — VANCOMYCIN HYDROCHLORIDE 125 MG/1
125 CAPSULE ORAL 4 TIMES DAILY
Status: DISCONTINUED | OUTPATIENT
Start: 2024-04-27 | End: 2024-04-27

## 2024-04-27 RX ADMIN — LACTULOSE 30 G: 20 SOLUTION ORAL at 12:59

## 2024-04-27 RX ADMIN — POTASSIUM CHLORIDE 10 MEQ: 7.46 INJECTION, SOLUTION INTRAVENOUS at 20:18

## 2024-04-27 RX ADMIN — LORAZEPAM 3 MG: 2 INJECTION INTRAMUSCULAR; INTRAVENOUS at 22:32

## 2024-04-27 RX ADMIN — ONDANSETRON 4 MG: 2 INJECTION INTRAMUSCULAR; INTRAVENOUS at 22:22

## 2024-04-27 RX ADMIN — FERROUS SULFATE TAB 325 MG (65 MG ELEMENTAL FE) 325 MG: 325 (65 FE) TAB at 20:23

## 2024-04-27 RX ADMIN — POTASSIUM CHLORIDE 10 MEQ: 7.46 INJECTION, SOLUTION INTRAVENOUS at 16:49

## 2024-04-27 RX ADMIN — POTASSIUM CHLORIDE 10 MEQ: 7.46 INJECTION, SOLUTION INTRAVENOUS at 19:22

## 2024-04-27 RX ADMIN — POTASSIUM CHLORIDE 10 MEQ: 7.46 INJECTION, SOLUTION INTRAVENOUS at 18:02

## 2024-04-27 RX ADMIN — Medication 200 MG: at 13:00

## 2024-04-27 RX ADMIN — CEFTRIAXONE SODIUM 1000 MG: 1 INJECTION, POWDER, FOR SOLUTION INTRAMUSCULAR; INTRAVENOUS at 16:19

## 2024-04-27 RX ADMIN — LORAZEPAM 2 MG: 2 INJECTION INTRAMUSCULAR; INTRAVENOUS at 21:04

## 2024-04-27 RX ADMIN — FOLIC ACID 1 MG: 1 TABLET ORAL at 13:00

## 2024-04-27 RX ADMIN — FERROUS SULFATE TAB 325 MG (65 MG ELEMENTAL FE) 325 MG: 325 (65 FE) TAB at 13:00

## 2024-04-27 RX ADMIN — POTASSIUM CHLORIDE 10 MEQ: 7.46 INJECTION, SOLUTION INTRAVENOUS at 23:22

## 2024-04-27 RX ADMIN — LORAZEPAM 2 MG: 2 INJECTION INTRAMUSCULAR; INTRAVENOUS at 18:36

## 2024-04-27 RX ADMIN — LOSARTAN POTASSIUM 50 MG: 50 TABLET, FILM COATED ORAL at 13:00

## 2024-04-27 RX ADMIN — ENOXAPARIN SODIUM 40 MG: 100 INJECTION SUBCUTANEOUS at 12:59

## 2024-04-27 RX ADMIN — SUCRALFATE 1 G: 1 TABLET ORAL at 20:23

## 2024-04-27 RX ADMIN — THIAMINE HCL TAB 100 MG 100 MG: 100 TAB at 13:00

## 2024-04-27 RX ADMIN — SUCRALFATE 1 G: 1 TABLET ORAL at 13:00

## 2024-04-27 RX ADMIN — Medication 1 CAPSULE: at 16:20

## 2024-04-27 RX ADMIN — METOPROLOL TARTRATE 50 MG: 50 TABLET, FILM COATED ORAL at 13:00

## 2024-04-27 RX ADMIN — LORAZEPAM 2 MG: 1 TABLET ORAL at 13:00

## 2024-04-27 RX ADMIN — METOPROLOL TARTRATE 50 MG: 50 TABLET, FILM COATED ORAL at 20:23

## 2024-04-27 RX ADMIN — POTASSIUM CHLORIDE 10 MEQ: 7.46 INJECTION, SOLUTION INTRAVENOUS at 22:01

## 2024-04-27 RX ADMIN — SPIRONOLACTONE 25 MG: 25 TABLET ORAL at 16:20

## 2024-04-27 RX ADMIN — LORAZEPAM 2 MG: 2 INJECTION INTRAMUSCULAR; INTRAVENOUS at 16:20

## 2024-04-27 RX ADMIN — DULOXETINE HYDROCHLORIDE 30 MG: 30 CAPSULE, DELAYED RELEASE ORAL at 13:00

## 2024-04-27 RX ADMIN — SUCRALFATE 1 G: 1 TABLET ORAL at 16:25

## 2024-04-28 LAB
ANION GAP SERPL CALCULATED.3IONS-SCNC: 9 MMOL/L (ref 9–17)
BUN SERPL-MCNC: 6 MG/DL (ref 8–23)
CALCIUM SERPL-MCNC: 7.9 MG/DL (ref 8.6–10.4)
CHLORIDE SERPL-SCNC: 103 MMOL/L (ref 98–107)
CO2 SERPL-SCNC: 24 MMOL/L (ref 20–31)
CREAT SERPL-MCNC: 0.8 MG/DL (ref 0.7–1.2)
GFR SERPL CREATININE-BSD FRML MDRD: >90 ML/MIN/1.73M2
GLUCOSE SERPL-MCNC: 109 MG/DL (ref 70–99)
POTASSIUM SERPL-SCNC: 3.6 MMOL/L (ref 3.7–5.3)
POTASSIUM SERPL-SCNC: 3.7 MMOL/L (ref 3.7–5.3)
SODIUM SERPL-SCNC: 136 MMOL/L (ref 135–144)

## 2024-04-28 PROCEDURE — 97530 THERAPEUTIC ACTIVITIES: CPT

## 2024-04-28 PROCEDURE — 6370000000 HC RX 637 (ALT 250 FOR IP): Performed by: NURSE PRACTITIONER

## 2024-04-28 PROCEDURE — 99232 SBSQ HOSP IP/OBS MODERATE 35: CPT | Performed by: PSYCHIATRY & NEUROLOGY

## 2024-04-28 PROCEDURE — 6360000002 HC RX W HCPCS: Performed by: INTERNAL MEDICINE

## 2024-04-28 PROCEDURE — 36415 COLL VENOUS BLD VENIPUNCTURE: CPT

## 2024-04-28 PROCEDURE — 6370000000 HC RX 637 (ALT 250 FOR IP): Performed by: INTERNAL MEDICINE

## 2024-04-28 PROCEDURE — 99232 SBSQ HOSP IP/OBS MODERATE 35: CPT | Performed by: INTERNAL MEDICINE

## 2024-04-28 PROCEDURE — 2580000003 HC RX 258: Performed by: INTERNAL MEDICINE

## 2024-04-28 PROCEDURE — 80048 BASIC METABOLIC PNL TOTAL CA: CPT

## 2024-04-28 PROCEDURE — 6370000000 HC RX 637 (ALT 250 FOR IP): Performed by: PSYCHIATRY & NEUROLOGY

## 2024-04-28 PROCEDURE — 84132 ASSAY OF SERUM POTASSIUM: CPT

## 2024-04-28 PROCEDURE — 1200000000 HC SEMI PRIVATE

## 2024-04-28 RX ORDER — LOSARTAN POTASSIUM 50 MG/1
100 TABLET ORAL DAILY
Status: DISCONTINUED | OUTPATIENT
Start: 2024-04-29 | End: 2024-05-01

## 2024-04-28 RX ADMIN — POTASSIUM BICARBONATE 40 MEQ: 782 TABLET, EFFERVESCENT ORAL at 10:03

## 2024-04-28 RX ADMIN — FERROUS SULFATE TAB 325 MG (65 MG ELEMENTAL FE) 325 MG: 325 (65 FE) TAB at 19:48

## 2024-04-28 RX ADMIN — LORAZEPAM 1 MG: 1 TABLET ORAL at 19:53

## 2024-04-28 RX ADMIN — FOLIC ACID 1 MG: 1 TABLET ORAL at 09:58

## 2024-04-28 RX ADMIN — DULOXETINE HYDROCHLORIDE 30 MG: 30 CAPSULE, DELAYED RELEASE ORAL at 09:59

## 2024-04-28 RX ADMIN — LOSARTAN POTASSIUM 50 MG: 50 TABLET, FILM COATED ORAL at 09:59

## 2024-04-28 RX ADMIN — ENOXAPARIN SODIUM 40 MG: 100 INJECTION SUBCUTANEOUS at 09:58

## 2024-04-28 RX ADMIN — CEFTRIAXONE SODIUM 1000 MG: 1 INJECTION, POWDER, FOR SOLUTION INTRAMUSCULAR; INTRAVENOUS at 14:01

## 2024-04-28 RX ADMIN — Medication 200 MG: at 09:58

## 2024-04-28 RX ADMIN — Medication 1 CAPSULE: at 09:59

## 2024-04-28 RX ADMIN — Medication 1 CAPSULE: at 17:50

## 2024-04-28 RX ADMIN — ACETAMINOPHEN 650 MG: 325 TABLET ORAL at 22:16

## 2024-04-28 RX ADMIN — SUCRALFATE 1 G: 1 TABLET ORAL at 09:59

## 2024-04-28 RX ADMIN — Medication 1 CAPSULE: at 13:58

## 2024-04-28 RX ADMIN — SODIUM CHLORIDE: 9 INJECTION, SOLUTION INTRAVENOUS at 13:56

## 2024-04-28 RX ADMIN — FERROUS SULFATE TAB 325 MG (65 MG ELEMENTAL FE) 325 MG: 325 (65 FE) TAB at 09:59

## 2024-04-28 RX ADMIN — ONDANSETRON 4 MG: 2 INJECTION INTRAMUSCULAR; INTRAVENOUS at 19:53

## 2024-04-28 RX ADMIN — SUCRALFATE 1 G: 1 TABLET ORAL at 13:58

## 2024-04-28 RX ADMIN — METOPROLOL TARTRATE 50 MG: 50 TABLET, FILM COATED ORAL at 09:59

## 2024-04-28 RX ADMIN — METOPROLOL TARTRATE 50 MG: 50 TABLET, FILM COATED ORAL at 19:48

## 2024-04-28 RX ADMIN — THIAMINE HCL TAB 100 MG 100 MG: 100 TAB at 09:58

## 2024-04-28 RX ADMIN — SUCRALFATE 1 G: 1 TABLET ORAL at 19:48

## 2024-04-28 RX ADMIN — SPIRONOLACTONE 25 MG: 25 TABLET ORAL at 09:59

## 2024-04-28 ASSESSMENT — PAIN SCALES - GENERAL: PAINLEVEL_OUTOF10: 5

## 2024-04-28 ASSESSMENT — PAIN DESCRIPTION - DESCRIPTORS: DESCRIPTORS: ACHING

## 2024-04-28 ASSESSMENT — PAIN DESCRIPTION - LOCATION: LOCATION: HEAD

## 2024-04-29 LAB — AMMONIA PLAS-SCNC: 30 UMOL/L (ref 16–60)

## 2024-04-29 PROCEDURE — 6370000000 HC RX 637 (ALT 250 FOR IP): Performed by: PSYCHIATRY & NEUROLOGY

## 2024-04-29 PROCEDURE — 82140 ASSAY OF AMMONIA: CPT

## 2024-04-29 PROCEDURE — 6370000000 HC RX 637 (ALT 250 FOR IP): Performed by: INTERNAL MEDICINE

## 2024-04-29 PROCEDURE — 36415 COLL VENOUS BLD VENIPUNCTURE: CPT

## 2024-04-29 PROCEDURE — 1200000000 HC SEMI PRIVATE

## 2024-04-29 PROCEDURE — 6360000002 HC RX W HCPCS: Performed by: INTERNAL MEDICINE

## 2024-04-29 PROCEDURE — 2580000003 HC RX 258: Performed by: INTERNAL MEDICINE

## 2024-04-29 PROCEDURE — 99233 SBSQ HOSP IP/OBS HIGH 50: CPT | Performed by: INTERNAL MEDICINE

## 2024-04-29 RX ADMIN — FOLIC ACID 1 MG: 1 TABLET ORAL at 08:03

## 2024-04-29 RX ADMIN — Medication 1 CAPSULE: at 14:17

## 2024-04-29 RX ADMIN — SUCRALFATE 1 G: 1 TABLET ORAL at 08:03

## 2024-04-29 RX ADMIN — METOPROLOL TARTRATE 50 MG: 50 TABLET, FILM COATED ORAL at 08:03

## 2024-04-29 RX ADMIN — SUCRALFATE 1 G: 1 TABLET ORAL at 14:17

## 2024-04-29 RX ADMIN — DULOXETINE HYDROCHLORIDE 30 MG: 30 CAPSULE, DELAYED RELEASE ORAL at 08:03

## 2024-04-29 RX ADMIN — FERROUS SULFATE TAB 325 MG (65 MG ELEMENTAL FE) 325 MG: 325 (65 FE) TAB at 21:25

## 2024-04-29 RX ADMIN — SUCRALFATE 1 G: 1 TABLET ORAL at 21:25

## 2024-04-29 RX ADMIN — METOPROLOL TARTRATE 50 MG: 50 TABLET, FILM COATED ORAL at 21:25

## 2024-04-29 RX ADMIN — Medication 1 CAPSULE: at 08:03

## 2024-04-29 RX ADMIN — ENOXAPARIN SODIUM 40 MG: 100 INJECTION SUBCUTANEOUS at 08:03

## 2024-04-29 RX ADMIN — THIAMINE HCL TAB 100 MG 100 MG: 100 TAB at 08:03

## 2024-04-29 RX ADMIN — FERROUS SULFATE TAB 325 MG (65 MG ELEMENTAL FE) 325 MG: 325 (65 FE) TAB at 08:03

## 2024-04-29 RX ADMIN — LOSARTAN POTASSIUM 100 MG: 50 TABLET, FILM COATED ORAL at 08:03

## 2024-04-29 RX ADMIN — SODIUM CHLORIDE, PRESERVATIVE FREE 10 ML: 5 INJECTION INTRAVENOUS at 21:30

## 2024-04-29 RX ADMIN — LACTULOSE 30 G: 20 SOLUTION ORAL at 08:02

## 2024-04-29 RX ADMIN — Medication 200 MG: at 08:03

## 2024-04-29 RX ADMIN — SPIRONOLACTONE 25 MG: 25 TABLET ORAL at 08:03

## 2024-04-30 PROCEDURE — 6370000000 HC RX 637 (ALT 250 FOR IP): Performed by: INTERNAL MEDICINE

## 2024-04-30 PROCEDURE — 99232 SBSQ HOSP IP/OBS MODERATE 35: CPT | Performed by: INTERNAL MEDICINE

## 2024-04-30 PROCEDURE — 97116 GAIT TRAINING THERAPY: CPT

## 2024-04-30 PROCEDURE — 6370000000 HC RX 637 (ALT 250 FOR IP): Performed by: PSYCHIATRY & NEUROLOGY

## 2024-04-30 PROCEDURE — 1200000000 HC SEMI PRIVATE

## 2024-04-30 PROCEDURE — 6360000002 HC RX W HCPCS: Performed by: INTERNAL MEDICINE

## 2024-04-30 PROCEDURE — 2580000003 HC RX 258: Performed by: INTERNAL MEDICINE

## 2024-04-30 RX ADMIN — DULOXETINE HYDROCHLORIDE 30 MG: 30 CAPSULE, DELAYED RELEASE ORAL at 07:44

## 2024-04-30 RX ADMIN — SUCRALFATE 1 G: 1 TABLET ORAL at 22:01

## 2024-04-30 RX ADMIN — LOSARTAN POTASSIUM 100 MG: 50 TABLET, FILM COATED ORAL at 07:47

## 2024-04-30 RX ADMIN — ACETAMINOPHEN 650 MG: 325 TABLET ORAL at 03:51

## 2024-04-30 RX ADMIN — ENOXAPARIN SODIUM 40 MG: 100 INJECTION SUBCUTANEOUS at 07:51

## 2024-04-30 RX ADMIN — SPIRONOLACTONE 25 MG: 25 TABLET ORAL at 07:45

## 2024-04-30 RX ADMIN — LACTULOSE 30 G: 20 SOLUTION ORAL at 07:50

## 2024-04-30 RX ADMIN — METOPROLOL TARTRATE 50 MG: 50 TABLET, FILM COATED ORAL at 07:50

## 2024-04-30 RX ADMIN — Medication 1 CAPSULE: at 07:49

## 2024-04-30 RX ADMIN — FERROUS SULFATE TAB 325 MG (65 MG ELEMENTAL FE) 325 MG: 325 (65 FE) TAB at 22:01

## 2024-04-30 RX ADMIN — SODIUM CHLORIDE, PRESERVATIVE FREE 10 ML: 5 INJECTION INTRAVENOUS at 22:01

## 2024-04-30 RX ADMIN — FOLIC ACID 1 MG: 1 TABLET ORAL at 07:49

## 2024-04-30 RX ADMIN — ONDANSETRON 4 MG: 2 INJECTION INTRAMUSCULAR; INTRAVENOUS at 19:13

## 2024-04-30 RX ADMIN — Medication 200 MG: at 07:46

## 2024-04-30 RX ADMIN — METOPROLOL TARTRATE 50 MG: 50 TABLET, FILM COATED ORAL at 22:01

## 2024-04-30 RX ADMIN — Medication 1 CAPSULE: at 12:43

## 2024-04-30 RX ADMIN — SUCRALFATE 1 G: 1 TABLET ORAL at 12:43

## 2024-04-30 RX ADMIN — Medication 1 CAPSULE: at 17:40

## 2024-04-30 RX ADMIN — SUCRALFATE 1 G: 1 TABLET ORAL at 05:37

## 2024-04-30 RX ADMIN — THIAMINE HCL TAB 100 MG 100 MG: 100 TAB at 07:46

## 2024-04-30 RX ADMIN — SODIUM CHLORIDE, PRESERVATIVE FREE 10 ML: 5 INJECTION INTRAVENOUS at 07:55

## 2024-04-30 RX ADMIN — FERROUS SULFATE TAB 325 MG (65 MG ELEMENTAL FE) 325 MG: 325 (65 FE) TAB at 07:45

## 2024-04-30 ASSESSMENT — PAIN SCALES - GENERAL
PAINLEVEL_OUTOF10: 0
PAINLEVEL_OUTOF10: 5
PAINLEVEL_OUTOF10: 0
PAINLEVEL_OUTOF10: 7

## 2024-04-30 ASSESSMENT — PAIN DESCRIPTION - DESCRIPTORS
DESCRIPTORS: ACHING
DESCRIPTORS: ACHING

## 2024-04-30 ASSESSMENT — PAIN DESCRIPTION - LOCATION
LOCATION: BACK
LOCATION: BACK

## 2024-04-30 ASSESSMENT — PAIN DESCRIPTION - ORIENTATION
ORIENTATION: MID;LOWER
ORIENTATION: MID;LOWER

## 2024-04-30 NOTE — H&P
HISTORY and [address]       NAME:  Dwight Mohr  MRN: [id number]   YOB: 1960   Date: 1/13/2023   Age: 60 y.o. Gender: male       COMPLAINT AND PRESENT HISTORY:     Dwight Mohr is 60 y.o.,  male, presents for paracentesis treatment for alcoholic cirrhosis     Primary dx: Alcoholic cirrhosis      HPI:  See portion of the note below per Lina Adams MD  Physician Infectious Disease  Initial history  Dwihgt Mohr is a 60y.o.-year-old male who presented to the hospital 12/29 because of weakness fatigue, recurrent upper GI bleed with melena and anemia. Admitted for GI work-up, he was having bloody stools/melena  Initially seen at [facility] started on octreotide, transferred to [facility] for reevaluation of the TIPS  CT abdomen showed rectal spasm with fluid in the rectum. TIPS was apparently patent. Ascites sample 12/28 was culture negative, And the number of WBCs was not suggestive of infection. A repeat paracentesis was performed 1/3/23, culture: No growth   CT abdomen and pelvis suggested possible bilateral lower lobe atelectasis. Upon review these changes could potentially represent pneumonia. Infectious disease consulted to manage antibiotics, the patient was started on broad spectrum antibiotics on 1/1, which he completed on 1-7-23. Pt had Paracentesis,  on 1/3/23 he has  2200 ml yellow clear fluid remover. WBC 90 and cx pend - doubt infected  Had EGD 1/3 which shows no bleed, grade 1-2 varices distal esophagus and few AVMs and mild diffuse portal HTN  1/5/23  Increased shortness of breath, he is on room air at this point, abdomen distended,  Tips have been revisited on 12/30     Update HPI:  Dwight Mohr is 60 y.o.,  male, here today for paracentesis. Patient is receiving treatments paracentesis   Patient has history of liver cirrhosis with ascites.    Pt state he was at [facility] ER and he get admitted  on 12/27/ Mom is requesting a different oral medication due to the patient is having a difficult time taking it. Please give her a call back.    22 and he discahrege on 1/11/23 due to black tarry stools, and abdominal pain. Pt states he had 2 unit of packed cell transfusion due to low hemoglobin. Pt has history of esophageal cancer, s/p TIPS procedure, patient undergo serial paracentesis. Patient last had last paracentesis on 1/3/23 with A total of 2200 ml yellow clear fluid was removed. Pt denies any abdominal pain today , he states : My Body is full of water\". Patient admits to associated shortness of breath, improved by sitting in chair, worse when lying flat. Patient is  easy bleeding/bruising. Patient is swelling in the lower legs bilaterally. No other concerns today, no recent fever/chills, no chest pain, no shortness of breath currently. Review of additional significant medical hx:  (See chart for additional detail, including current medications /see ROS for current S/S):   ESOPHAGEAL CA, HEP. C, HX OF ETOH ABUSE,  PORTAL HTN, ESOPHAGEAL VARICES, CIRRHOSIS, HTN, HLD, JIMENES ESOPHAGUS, COVID-19, SOB. ECHO Complete 2D W Doppler W Color 12/28/22     CONCLUSIONS  Summary  Left ventricle is normal in size. Global left ventricular systolic function  is normal. Calculated ejection fraction 60% by Heart Model. No significant valvular regurgitation or stenosis seen. Moderate circumferential pericardial effusion seen. Measures 2.2cm  anteriorly and 2.26cm posteriorly. Clot-like structure noted in anterior pericardial space. No signs of tamponade. Consider clinical correlation. EKG 12 Lead 12/28/22  Narrative & Impression  Sinus tachycardia  Low voltage QRS  Nonspecific T wave abnormality  Abnormal ECG  When compared with ECG of 01-JAN-2023 18:26,  Nonspecific T wave abnormality now evident in Lateral leads    NPO status: pt NPO since 9:30 am today   Medications taken TODAY (with sip of water): no am medication today   Anticoagulation status: none  Denies personal hx of blood clots. Denies personal hx of MRSA infection.   Denies any personal or family hx of previous complications w/anesthesia.     PAST MEDICAL HISTORY     Past Medical History:   Diagnosis Date    Adenocarcinoma in a polyp ([address]     Alcoholic cirrhosis of liver with ascites ([address]     Anemia 04/13/2022    Anxiety     Arthritis     Back pain, chronic     dr. Ritika Umaña, orthopedic, every 3-4 months, gets steroid injection    Lezama esophagus     Bleeding gastric varices 12/29/2022    BPH (benign prostatic hypertrophy)     Cholelithiasis     Cirrhosis ([address]     COVID-19 12/2020    pt reports he had a positive test while at [facility] in 2020, was asymptomatic    COVID-19 vaccine series completed 5/20/2021, 6/22/2021    Moderna 5/20/2021, 6/22/2021    DDD (degenerative disc disease), lumbar     Depression     Esophageal cancer ([address]     INVASIVE ADENOCARCINOMA ARISING IN TUBULAR ADENOMA WITH HIGH GRADE DYSPLASIA, ASSOCIATED WITH FOCAL INTESTINAL METAPLASIA     Esophageal varices ([address]     Fatty liver     GERD (gastroesophageal reflux disease)     GI bleed     Hep C w/o coma, chronic ([address]     History of alcohol abuse     6-12 beers a day; quit drinking 2019    History of blood transfusion     History of colon polyps 2016    History of tobacco abuse     [place] (hard of hearing)     Hyperlipidemia     Hypertension     Hyponatremia 07/20/2016    Hypotension 12/20/2021    Mastoid disorder, bilateral 12/31/2022    biLateral mastoid effusion    PONV (postoperative nausea and vomiting)     Port-A-Cath in place     right upper chest    Portal hypertension ([address]     Sciatica     Secondary esophageal varices ([address] 06/07/2022    Shortness of breath     Spinal stenosis     Stomach ulcer     hx of    Thrombocytopenia ([address] 12/23/2020    Tubular adenoma of colon 2016, 2018    Vitamin D deficiency     Wears glasses        SURGICAL HISTORY       Past Surgical History:   Procedure Laterality Date    BUNIONECTOMY      twice on right side    BUNIONECTOMY Left     CARPAL TUNNEL RELEASE Right COLONOSCOPY      at age 35    COLONOSCOPY  10/05/2016    polyps-pathology tubular adenoma, and abnormal looking mucosa right colon-pathology-tubular adenoma    COLONOSCOPY N/A 03/30/2018    COLONOSCOPY POLYPECTOMY COLD BIOPSY performed by Navdeep Moreno MD at [facility]  03/30/2018    Small polyp in the sigmoid colon and excised with biopsy forceps--tubular adenoma    COLONOSCOPY N/A 04/16/2022    COLONOSCOPY POLYPECTOMY performed by Navdeep Moreno MD at [facility], DIAGNOSTIC      EGD    ESOPHAGOGASTRODUODENOSCOPY  12/29/2022    ESOPHAGOGASTRODUODENOSCOPY N/A 01/03/2023    IR PORT PLACEMENT EQUAL OR GREATER THAN 5 YEARS  04/19/2021    IR PORT PLACEMENT EQUAL OR GREATER THAN 5 YEARS 4/19/2021 [facility] SPECIAL PROCEDURES    IR TIPS INSERTION  12/01/2022    IR TIPS INSERTION 12/1/2022 Maribell Jeong MD [facility] SPECIAL PROCEDURES    KNEE SURGERY Left     cyst removed    NASAL SEPTUM SURGERY      NERVE BLOCK Right 11/23/2020    NERVE BLOCK RIGHT CERVICAL STEROID INJECTION  C3-C6 performed by Jose Rafael Alarcon MD at [facility]  01/04/2016    steroid injection C7 T1    OTHER SURGICAL HISTORY  11/21/2016    Bilateral Lumbar CACHORRO L4-L5 injections    OTHER SURGICAL HISTORY  12/19/2016    lumbar steroid injection    OTHER SURGICAL HISTORY  09/28/2018    BILATERAL L5 CACHORRO (N/A Back)    OTHER SURGICAL HISTORY Right 11/23/2020    cervical injection    PAIN MANAGEMENT PROCEDURE Left 07/09/2020    EPIDURAL STEROID INJECTION LEFT L4 L5 performed by Jose Rafael Alarcon MD at [facility] Left 07/20/2020    LEFT L4 L5 EPIDURAL STEROID INJECTION performed by Jose Rafael Alarcon MD at [facility] Bilateral 08/17/2020    LUMBAR FACET BILATERAL L2-L5 performed by Jose Rafael Alarcon MD at [facility] Bilateral 12/07/2020    NERVE BLOCK BILATERAL LUMBAR MEDIAL BRANCH L2-L5 performed by Jose Rafael Alarcon MD at [address] [place] [address] PARACENTESIS      Multiple    IA NEUROPLASTY &/TRANSPOS MEDIAN NRV CARPAL TUNNE Right 08/29/2017    CARPAL TUNNEL RELEASE RIGHT performed by Horacio Arreola MD at [address] &/TRANSPOS MEDIAN NRV CARPAL TUNNE Left 10/31/2017    CARPAL TUNNEL RELEASE performed by Horacio Arreola MD at [place] [address] AA&/STRD TFRML EPI LUMBAR/SACRAL 1 LEVEL Bilateral 09/06/2018    BILATERAL L5 CACHORRO performed by Niko Rai MD at [place] [id number] AA&/STRD TFRML EPI LUMBAR/SACRAL 1 LEVEL N/A 09/28/2018    BILATERAL L5 CACHORRO performed by Niko Rai MD at [facility] 12/29/2020    EGD BIOPSY performed by Albert Mccord MD at [address] [place] [address] 02/02/2021    EGD BIOPSY and spot marking performed by Liya Milton MD at [address] [place] [address] 02/12/2021    ENDOSCOPIC ULTRASOUND, EGD performed by Mk Lamar MD at [place] [address]  02/12/2021    EGD DIAGNOSTIC ONLY performed by Mk Lamar MD at [place] [address] N/A 08/31/2021    EGD BIOPSY performed by Liya Milton MD at [address] [place] [address] 01/21/2022    EGD BIOPSY performed by Liya Milton MD at [address] [place] [address] N/A 04/15/2022    EGD ESOPHAGOGASTRODUODENOSCOPY performed by Albert Mccord MD at [address] [place] [address] 06/06/2022    EGD BAND LIGATION performed by Gil Espinoza MD at [address] [place] [address] N/A 06/09/2022    EGD ESOPHAGOGASTRODUODENOSCOPY performed by Gil Espinoza MD at [address] [place] [address] N/A 09/14/2022    EGD ESOPHAGOGASTRODUODENOSCOPY ENDOSCOPIC APC AT GE JUNCTION performed by Jamal Dooley MD at [address] [place] [address] 10/19/2022    EGD BIOPSY performed by Liya Milton MD at [facility] GASTROINTESTINAL ENDOSCOPY N/A 10/31/2022    EGD with APC performed by Phil Naranjo MD at [facility]  2022    EGD ESOPHAGOGASTRODUODENOSCOPY performed by Jian Murillo MD at [facility] 1/3/2023    EGD ESOPHAGOGASTRODUODENOSCOPY performed by Marsha Keith MD at [address] [place] [address]       Family History   Problem Relation Age of Onset    Cancer Mother         pancreatic    Cancer Father         bone    Diabetes Sister     Asthma Brother     Allergies Brother         MULTIPLE       SOCIAL HISTORY       Social History     Socioeconomic History    Marital status: Single   Tobacco Use    Smoking status: Former     Packs/day: 1.00     Years: 45.00     Pack years: 45.00     Types: Cigarettes     Quit date: 2017     Years since quittin.9    Smokeless tobacco: Never   Vaping Use    Vaping Use: Never used   Substance and Sexual Activity    Alcohol use: Not Currently     Comment: Quit alcohol in 2019- heavier drinking prior to quitting    Drug use: Not Currently     Frequency: 1.0 times per week     Types: Cocaine     Comment: Cocaine- stopped spring 2016    Sexual activity: Yes     Partners: Female   Social History Narrative     in the past, retired     Social Determinants of Health     Financial Resource Strain: Low Risk     Difficulty of Paying Living Expenses: Not hard at all   Food Insecurity: No Food Insecurity    Worried About [address] in the Last Year: Never true    [facility] in the Last Year: Never true   Physical Activity: Inactive    Days of Exercise per Week: 0 days    Minutes of Exercise per Session: 0 min           REVIEW OF SYSTEMS      No Known Allergies    Current Outpatient Medications on File Prior to Encounter   Medication Sig Dispense Refill    furosemide (LASIX) 40 MG tablet Take 1 tablet by mouth in the morning and 1 tablet in the evening.  61 tablet 3    spironolactone (ALDACTONE) 25 MG tablet Take 1 tablet by mouth daily 30 tablet 3    pantoprazole (PROTONIX) 40 MG tablet Take 40 mg by mouth daily      FEROSUL 325 (65 Fe) MG tablet take 1 tablet by mouth twice a day 60 tablet 5    lactulose (CHRONULAC) 10 GM/15ML solution take 30 milliliters by mouth three times a day 473 mL 1    FLUoxetine (PROZAC) 20 MG capsule Take 1 capsule by mouth daily 30 capsule 3    atorvastatin (LIPITOR) 20 MG tablet Take 1 tablet by mouth nightly 30 tablet 3     No current facility-administered medications on file prior to encounter. Review of Systems   Constitutional:  Positive for unexpected weight change. Negative for activity change, appetite change and fatigue. HENT: Negative. Eyes:  Positive for visual disturbance. Wearing eye glasses    Respiratory:  Negative for cough, shortness of breath and wheezing. Cardiovascular:  Positive for leg swelling. Negative for chest pain and palpitations. Gastrointestinal:  Positive for abdominal distention. Negative for blood in stool, constipation, nausea and vomiting. Genitourinary: Negative. Musculoskeletal: Negative. Skin: Negative. Neurological: Negative. Hematological:  Bruises/bleeds easily. Psychiatric/Behavioral: Negative. GENERAL PHYSICAL EXAM     Vitals: /68   Pulse 94   Temp 97.7 °F (36.5 °C) (Temporal)   Resp 20   Ht 5' 10\" (1.778 m)   Wt 218 lb (98.9 kg)   SpO2 94%   BMI 31.28 kg/m²  Body mass index is 31.28 kg/m². GENERAL APPEARANCE:   Daniela Bernal is 60 y.o.,  male, mildly obese, nourished, conscious, alert. Does not appear to be distress or pain at this time. Physical Exam  Constitutional:       General: He is not in acute distress. Appearance: Normal appearance. He is obese. He is not ill-appearing. HENT:      Head: Normocephalic.       Right Ear: External ear normal.      Left Ear: External ear normal. Nose: Nose normal.      Mouth/Throat:      Mouth: Mucous membranes are moist.   Eyes:      General:         Right eye: No discharge. Left eye: No discharge. Cardiovascular:      Rate and Rhythm: Normal rate and regular rhythm. Pulses: Normal pulses. Radial pulses are 2+ on the right side and 2+ on the left side. Dorsalis pedis pulses are 2+ on the right side and 2+ on the left side. Posterior tibial pulses are 2+ on the right side and 2+ on the left side. Heart sounds: Normal heart sounds. No murmur heard. Pulmonary:      Effort: Pulmonary effort is normal.      Breath sounds: Normal breath sounds. No wheezing or rhonchi. Abdominal:      General: Bowel sounds are normal. There is distension. Palpations: Abdomen is soft. Comments: Ascites    Musculoskeletal:         General: No swelling. Right lower le+ Edema present. Left lower le+ Edema present. Skin:     General: Skin is warm and dry. Findings: Bruising present. No erythema. Neurological:      General: No focal deficit present. Mental Status: He is alert and oriented to person, place, and time. Motor: No weakness.       Gait: Gait normal.   Psychiatric:         Mood and Affect: Mood normal.         Behavior: Behavior normal.                 PROVISIONAL DIAGNOSES / SURGERY:      Paracentesis      Alcoholic cirrhosis     Patient Active Problem List    Diagnosis Date Noted    Anasarca 2023    Chronic hepatitis C without hepatic coma ([facility] 2023    Swelling of joint of upper arm, right 2023    Acute deep vein thrombosis (DVT) of brachial vein of right upper extremity ([facility] 2023    Lezama's esophagus with dysplasia 2022    S/P TIPS (transjugular intrahepatic portosystemic shunt) 2022    Goals of care, counseling/discussion 10/31/2022    ACP (advance care planning) 10/31/2022    Palliative care encounter 10/31/2022    GI bleed 2022 Esophageal polyp 06/07/2022    Drop in hemoglobin 06/03/2022    Shortness of breath     Ascites due to alcoholic cirrhosis ([address] 28/64/4736    Acute kidney failure, unspecified ([address] 04/21/2022    Muscle weakness (generalized) 07/28/2016    Other abnormalities of gait and mobility 07/28/2016    Mastoid disorder, bilateral 12/31/2022    Anemia 04/13/2022    Acute kidney injury ([address] 04/13/2022    Esophageal adenocarcinoma (HCC)     Low hemoglobin 12/20/2021    Symptomatic anemia, microcytic, acute 12/20/2021    Hypotension 12/20/2021    Former smoker, 50+ pack years, quit 2016 12/20/2021    HLD (hyperlipidemia) 12/20/2021    Abnormal findings on diagnostic imaging of spine 12/14/2021    Cervical spinal stenosis 12/14/2021    Spinal stenosis of lumbar region with neurogenic claudication 12/14/2021    Severe comorbid illness 11/30/2021    Gait instability 11/30/2021    Current smoker 04/05/2021    COVID-19 02/23/2021    Anxiety 02/23/2021    Malignant neoplasm of lower third of esophagus ([address]     Hypocalcemia 12/26/2020    Hypophosphatemia 12/26/2020    Alcohol abuse     Altered mental status     Thrombocytopenia ([address] 12/23/2020    Hepatitis C virus infection resolved after antiviral drug therapy 12/23/2020    Cervical facet syndrome 11/23/2020    Lumbar facet arthropathy 08/17/2020    Elevated LFTs 08/12/2020    Seasonal allergies 08/12/2020    S/P epidural steroid injection 08/05/2020    Essential hypertension 04/24/2019    Recurrent major depressive disorder in partial remission ([address] 04/24/2019    Pure hypercholesterolemia 02/04/2019    Acute hypokalemia 02/04/2019    Vitamin D deficiency 09/20/2017    History of hepatitis C 09/11/2017    Ganglion cyst 05/31/2017    Carpal tunnel syndrome of right wrist 05/31/2017    Tinnitus 03/23/2017    Eustachian tube dysfunction 03/23/2017    Lumbar radiculitis 11/08/2016    Lumbar disc herniation 11/08/2016    Gynecomastia, male 10/26/2016    Depression 10/13/2016 Vertebrogenic low back pain 10/06/2016    DDD (degenerative disc disease), lumbar 10/06/2016    Decompensation of cirrhosis of liver ([facility] 09/15/2016    Psychophysiologic insomnia 09/14/2016    Cirrhosis ([facility]     Hep C w/o coma, chronic (HCC)     Fatty liver     Calculus of gallbladder without cholecystitis 08/10/2016    Chronic viral hepatitis B without delta agent and without coma ([facility] 07/22/2016    Hypomagnesemia     Cervical radicular pain 01/04/2016    Tubular adenoma of colon 01/01/2016    History of colon polyps 01/01/2016    Back pain, chronic 04/19/2012    Hearing difficulty 04/19/2012    GERD (gastroesophageal reflux disease) 04/19/2012           MASSIMO Hadley - CNP on 1/13/2023 at 1:03 PM

## 2024-05-01 LAB
DEPRECATED S PNEUM5 IGG SER-MCNC: <0.4 U/ML
ENA JO1 IGG SER-ACNC: <0.4 U/ML
ENA SCL70 AB SER IA-ACNC: <0.6 U/ML
ENA SM AB SER-ACNC: <0.8 U/ML
ENA SS-A IGG SER QL: 1.9 U/ML
ENA SS-B IGG SER IA-ACNC: <0.3 U/ML
U1 SNRNP IGG SER IA-ACNC: 1.3 U/ML
U1 SNRNP IGG SER IA-ACNC: 2 U/ML
VIT B1 PYROPHOSHATE BLD-SCNC: 79 NMOL/L (ref 70–180)

## 2024-05-01 PROCEDURE — 99232 SBSQ HOSP IP/OBS MODERATE 35: CPT | Performed by: INTERNAL MEDICINE

## 2024-05-01 PROCEDURE — 6370000000 HC RX 637 (ALT 250 FOR IP): Performed by: INTERNAL MEDICINE

## 2024-05-01 PROCEDURE — 2580000003 HC RX 258: Performed by: INTERNAL MEDICINE

## 2024-05-01 PROCEDURE — 1200000000 HC SEMI PRIVATE

## 2024-05-01 PROCEDURE — 6360000002 HC RX W HCPCS: Performed by: INTERNAL MEDICINE

## 2024-05-01 PROCEDURE — 97116 GAIT TRAINING THERAPY: CPT

## 2024-05-01 PROCEDURE — 6370000000 HC RX 637 (ALT 250 FOR IP): Performed by: PSYCHIATRY & NEUROLOGY

## 2024-05-01 RX ORDER — SODIUM CHLORIDE 9 MG/ML
INJECTION, SOLUTION INTRAVENOUS CONTINUOUS
Status: DISCONTINUED | OUTPATIENT
Start: 2024-05-01 | End: 2024-05-02

## 2024-05-01 RX ORDER — LOSARTAN POTASSIUM 50 MG/1
50 TABLET ORAL DAILY
Status: DISCONTINUED | OUTPATIENT
Start: 2024-05-02 | End: 2024-05-02 | Stop reason: HOSPADM

## 2024-05-01 RX ORDER — MIRTAZAPINE 15 MG/1
15 TABLET, ORALLY DISINTEGRATING ORAL NIGHTLY
Status: DISCONTINUED | OUTPATIENT
Start: 2024-05-01 | End: 2024-05-02 | Stop reason: HOSPADM

## 2024-05-01 RX ADMIN — Medication 1 CAPSULE: at 16:59

## 2024-05-01 RX ADMIN — SUCRALFATE 1 G: 1 TABLET ORAL at 05:56

## 2024-05-01 RX ADMIN — FERROUS SULFATE TAB 325 MG (65 MG ELEMENTAL FE) 325 MG: 325 (65 FE) TAB at 08:53

## 2024-05-01 RX ADMIN — DULOXETINE HYDROCHLORIDE 30 MG: 30 CAPSULE, DELAYED RELEASE ORAL at 08:53

## 2024-05-01 RX ADMIN — Medication 1 CAPSULE: at 12:09

## 2024-05-01 RX ADMIN — SODIUM CHLORIDE: 9 INJECTION, SOLUTION INTRAVENOUS at 23:10

## 2024-05-01 RX ADMIN — Medication 200 MG: at 08:53

## 2024-05-01 RX ADMIN — FERROUS SULFATE TAB 325 MG (65 MG ELEMENTAL FE) 325 MG: 325 (65 FE) TAB at 23:06

## 2024-05-01 RX ADMIN — MIRTAZAPINE 15 MG: 15 TABLET, ORALLY DISINTEGRATING ORAL at 23:07

## 2024-05-01 RX ADMIN — SUCRALFATE 1 G: 1 TABLET ORAL at 12:09

## 2024-05-01 RX ADMIN — LOSARTAN POTASSIUM 100 MG: 50 TABLET, FILM COATED ORAL at 08:53

## 2024-05-01 RX ADMIN — Medication 1 CAPSULE: at 08:53

## 2024-05-01 RX ADMIN — SUCRALFATE 1 G: 1 TABLET ORAL at 23:05

## 2024-05-01 RX ADMIN — LACTULOSE 30 G: 20 SOLUTION ORAL at 08:53

## 2024-05-01 RX ADMIN — SPIRONOLACTONE 25 MG: 25 TABLET ORAL at 08:53

## 2024-05-01 RX ADMIN — METOPROLOL TARTRATE 50 MG: 50 TABLET, FILM COATED ORAL at 08:53

## 2024-05-01 RX ADMIN — METOPROLOL TARTRATE 50 MG: 50 TABLET, FILM COATED ORAL at 23:06

## 2024-05-01 RX ADMIN — THIAMINE HCL TAB 100 MG 100 MG: 100 TAB at 08:53

## 2024-05-01 RX ADMIN — SODIUM CHLORIDE: 9 INJECTION, SOLUTION INTRAVENOUS at 12:13

## 2024-05-01 RX ADMIN — SODIUM CHLORIDE, PRESERVATIVE FREE 10 ML: 5 INJECTION INTRAVENOUS at 08:56

## 2024-05-01 RX ADMIN — FOLIC ACID 1 MG: 1 TABLET ORAL at 08:53

## 2024-05-01 RX ADMIN — ENOXAPARIN SODIUM 40 MG: 100 INJECTION SUBCUTANEOUS at 08:54

## 2024-05-02 VITALS
RESPIRATION RATE: 18 BRPM | TEMPERATURE: 98 F | WEIGHT: 180 LBS | HEART RATE: 56 BPM | HEIGHT: 70 IN | OXYGEN SATURATION: 93 % | BODY MASS INDEX: 25.77 KG/M2 | SYSTOLIC BLOOD PRESSURE: 148 MMHG | DIASTOLIC BLOOD PRESSURE: 66 MMHG

## 2024-05-02 PROCEDURE — 6370000000 HC RX 637 (ALT 250 FOR IP): Performed by: INTERNAL MEDICINE

## 2024-05-02 PROCEDURE — 6370000000 HC RX 637 (ALT 250 FOR IP): Performed by: PSYCHIATRY & NEUROLOGY

## 2024-05-02 PROCEDURE — 6360000002 HC RX W HCPCS: Performed by: INTERNAL MEDICINE

## 2024-05-02 PROCEDURE — 99232 SBSQ HOSP IP/OBS MODERATE 35: CPT | Performed by: INTERNAL MEDICINE

## 2024-05-02 PROCEDURE — 2580000003 HC RX 258: Performed by: INTERNAL MEDICINE

## 2024-05-02 RX ORDER — METOPROLOL TARTRATE 50 MG/1
50 TABLET, FILM COATED ORAL 2 TIMES DAILY
Qty: 60 TABLET | Refills: 3 | Status: SHIPPED | OUTPATIENT
Start: 2024-05-02

## 2024-05-02 RX ORDER — FOLIC ACID 1 MG/1
1 TABLET ORAL DAILY
Qty: 30 TABLET | Refills: 3 | Status: SHIPPED | OUTPATIENT
Start: 2024-05-03

## 2024-05-02 RX ORDER — SPIRONOLACTONE 25 MG/1
25 TABLET ORAL DAILY
Qty: 30 TABLET | Refills: 3 | Status: SHIPPED | OUTPATIENT
Start: 2024-05-03

## 2024-05-02 RX ORDER — LOSARTAN POTASSIUM 50 MG/1
50 TABLET ORAL DAILY
Qty: 30 TABLET | Refills: 3 | Status: SHIPPED | OUTPATIENT
Start: 2024-05-03

## 2024-05-02 RX ADMIN — Medication 1 CAPSULE: at 10:20

## 2024-05-02 RX ADMIN — THIAMINE HCL TAB 100 MG 100 MG: 100 TAB at 10:21

## 2024-05-02 RX ADMIN — DULOXETINE HYDROCHLORIDE 30 MG: 30 CAPSULE, DELAYED RELEASE ORAL at 10:21

## 2024-05-02 RX ADMIN — METOPROLOL TARTRATE 50 MG: 50 TABLET, FILM COATED ORAL at 10:21

## 2024-05-02 RX ADMIN — FOLIC ACID 1 MG: 1 TABLET ORAL at 10:21

## 2024-05-02 RX ADMIN — FERROUS SULFATE TAB 325 MG (65 MG ELEMENTAL FE) 325 MG: 325 (65 FE) TAB at 10:20

## 2024-05-02 RX ADMIN — SUCRALFATE 1 G: 1 TABLET ORAL at 06:22

## 2024-05-02 RX ADMIN — LOSARTAN POTASSIUM 50 MG: 50 TABLET, FILM COATED ORAL at 10:21

## 2024-05-02 RX ADMIN — LACTULOSE 30 G: 20 SOLUTION ORAL at 10:21

## 2024-05-02 RX ADMIN — Medication 200 MG: at 10:21

## 2024-05-02 RX ADMIN — Medication 1 CAPSULE: at 13:03

## 2024-05-02 RX ADMIN — SUCRALFATE 1 G: 1 TABLET ORAL at 13:03

## 2024-05-02 RX ADMIN — SODIUM CHLORIDE: 9 INJECTION, SOLUTION INTRAVENOUS at 08:48

## 2024-05-02 RX ADMIN — ENOXAPARIN SODIUM 40 MG: 100 INJECTION SUBCUTANEOUS at 10:21

## 2024-05-02 RX ADMIN — SPIRONOLACTONE 25 MG: 25 TABLET ORAL at 10:21

## 2024-05-02 NOTE — CARE COORDINATION
Call placed to Graciela at [facility] to follow up on referral. Graciela will contact patients ex- wife today to discuss payment plan for outstanding bill      Return call from Graciela, payment arranged with ex-wife, facility ok to accept. Will need precert.   
Case Management Assessment  Initial Evaluation    Date/Time of Evaluation: 4/25/2024 1:32 PM  Assessment Completed by: Sharee Snyder RN    If patient is discharged prior to next notation, then this note serves as note for discharge by case management.    Patient Name: Frank Lisa                   YOB: 1960  Diagnosis: Hypokalemia [E87.6]  Hypomagnesemia [E83.42]  Abdominal pain, unspecified abdominal location [R10.9]  Chronic midline low back pain without sciatica [M54.50, G89.29]                   Date / Time: 4/23/2024  8:29 PM    Patient Admission Status: Inpatient   Readmission Risk (Low < 19, Mod (19-27), High > 27): Readmission Risk Score: 29.5    Current PCP: Lopez Rosario PA  PCP verified by CM? Yes    Chart Reviewed: Yes      History Provided by: Patient  Patient Orientation: Alert and Oriented    Patient Cognition: Alert    Hospitalization in the last 30 days (Readmission):  Yes    If yes, Readmission Assessment in CM Navigator will be completed.    Advance Directives:      Code Status: Full Code   Patient's Primary Decision Maker is: Legal Next of Kin    Primary Decision Maker: Nilam Lisa - Ex-Spouse - [phone]    Discharge Planning:    Patient lives with: Alone Type of Home: House  Primary Care Giver: Self  Patient Support Systems include: Family Members   Current Financial resources: Medicare  Current community resources: ECF/Home Care  Current services prior to admission: Home Care, Durable Medical Equipment            Current DME: [employer], [employer]            Type of Home Care services:  Nursing Services, OT, PT    ADLS  Prior functional level: Independent in ADLs/IADLs  Current functional level: Independent in ADLs/IADLs    PT AM-PAC: 18 /24  OT AM-PAC:   /24    Family can provide assistance at DC: Yes  Would you like Case Management to discuss the discharge plan with any other family members/significant others, and if so, who? Yes (Ex-wife, Nilam)  Plans to Return 
Graciela from [place] met with patients ex wife and coordinated payment. Pre cert will be started today.   
Graciela from [facility] wife was unable to meet her yesterday to complete payment plan for outstanding bill. She plans to meet wife today around noon, then pre cert will be able to be started.   
Insurance auth back per Graciela in admissions at [facility]. Able to admit to facility when medically ready for discharge   
ONGOING DISCHARGE PLAN:     Per Nursing,Unable to speak to pt. At this time.     Sitter at the bedside.     Per Nursing, Pt. Is on CIWA Scale.     Will continue to follow for additional discharge needs.    If patient is discharged prior to next notation, then this note serves as note for discharge by case management.    Electronically signed by Diana Carrillo RN on 4/27/2024 at 9:25 AM   
ONGOING DISCHARGE PLAN:    Patient is alert and oriented x4.    Spoke with patient regarding discharge plan and patient confirms that plan is still to return home with visiting nurse service.    VNS: [facility] prior.    DME: [employer] cane, [employer].    Cardiology consulted-atrial fibrillation, not sure he wants anticoagulant d/t prior GI bleed.    Neurology consulted-AMS, MRI ordered.    Ortho consulted-ankle and back pain.     Will continue to follow for additional discharge needs.    If patient is discharged prior to next notation, then this note serves as note for discharge by case management.    Electronically signed by Sharee Snyder RN on 4/26/2024 at 9:30 AM    
ONGOING DISCHARGE PLAN:    Patient is alert, however disoriented. Spoke with the patient's ex-spouse Nilam and referral faxed to [facility].    Ammonia level 30 today.     Cardiology on board-A.fib. Ortho consulted-ankle/back pain. Neurology on board-altered mental status.     Will continue to follow for additional discharge needs.    If patient is discharged prior to next notation, then this note serves as note for discharge by case management.    Electronically signed by Sharee Snyder RN on 4/29/2024 at 2:29 PM    ADDENDUM:    Graciela from [facility] states they are reviewing, the patient has been there in the past, however she believes he still has an outstanding bill with their facility. Awaiting acceptance.    Electronically signed by Sharee Snyder RN on 4/29/2024 at 4:06 PM      
ONGOING DISCHARGE PLAN:    Patient was sound asleep at the time of visit.     Will have CM see pt itzel & discuss DC plan.    Will continue to follow for additional discharge needs.    If patient is discharged prior to next notation, then this note serves as note for discharge by case management.    Electronically signed by Diana Carrillo RN on 4/28/2024 at 4:35 PM   
Transportation arranged for [facility] to [facility] Facility informed. Bedside nurse informed.     Number for Report: [phone]    
Writer contacts Graciela in admissions at [facility] to verify pre-cert was started yesterday. Graciela states auth was submitted and is pending at this time.   
CHRONULAC  45 mLs by PEG Tube route 3 times daily Hold for > 3 bms/day   sucralfate 1 GM tablet  Commonly known as: CARAFATE  1 tablet by PEG Tube route every 8 hours for 14 days     STOP taking these medications    STOP taking these medications  CENTRUM/CERTA-LUCILLE with minerals oral solution   ferrous sulfate 325 (65 Fe) MG tablet  Commonly known as: FeroSul   furosemide 20 MG tablet  Commonly known as: LASIX   gabapentin 100 MG capsule  Commonly known as: NEURONTIN   lansoprazole 30 MG disintegrating tablet  Commonly known as: PREVACID SOLUTAB   magnesium 200 MG Tabs tablet   nadolol 20 MG tablet  Commonly known as: CORGARD   ondansetron 4 MG disintegrating tablet  Commonly known as: ZOFRAN-ODT   potassium chloride 20 MEQ/15ML (10%) oral solution   Where to Get Your Medications      These medications were sent to [id number] - [facility] [address] [address] [facility] -  [phone] - F [phone]  [facility] [facility] [address]  Phone: [phone]       folic acid 1 MG tablet        losartan 50 MG tablet        metoprolol tartrate 50 MG tablet        spironolactone 25 MG tablet

## 2024-05-02 NOTE — PROGRESS NOTES
[facility]   IN-PATIENT SERVICE   [facility]    Progress note            Date:   5/2/2024  Patient name:  Frank Lisa  Date of admission:  4/23/2024  8:29 PM  MRN:   [id number]  Account:  [id number]  YOB: 1960  PCP:    Lopez Rosario PA  Room:   2060/2060-01  Code Status:    Full Code    Chief Complaint:     Chief Complaint   Patient presents with    Fall       History Obtained From:     patient    History of Present Illness:     The patient is a 63 y.o.  Non- / non  male who presents with Fall   and he is admitted to the hospital for the management of      The patient is 63-year-old male with multiple comorbidities include history of liver cirrhosis secondary to alcohol use and hepatitis C, history of TIPS, hepatitis C treated esophageal cancer, history of dysphagia s/p PEG follows up with oncology currently in remission , recently had EGD on 1/9/20/2024 did not show any evidence of recurrence,, presented to the ER with generalized weakness and fatigue and difficulty walking.  Patient states that he feels that he has some electrolyte abnormalities, patient does get hypomagnesemia and hypokalemia, patient states that he feels like his potassium and magnesium are getting too low that is causing him difficulty walking and falls,  Patient states that he is at couple of falls lately,  Came to ER, was found to be mildly hypokalemic and hypomagnesemic, electrolytes replaced, patient had MRI ordered, Dr. Cueva was already consulted from the ER  Patient was seen and examined on the floor, found to be in A-fib with RVR initially with a heart rate in 120,  New onset  Patient complaining of dizziness generalized weakness and fatigue and difficulty walking, attributing symptoms to underlying electrolyte abnormality, no palpitations chest pain or shortness of breath  4/29   Patient, very sleepy, very weak  Eval for by neurologist  Underwent MRI which was negative  Still 
    [facility]   IN-PATIENT SERVICE   [facility]    Progress note            Date:   5/1/2024  Patient name:  Frank Lisa  Date of admission:  4/23/2024  8:29 PM  MRN:   [id number]  Account:  [id number]  YOB: 1960  PCP:    Lopez Rosario PA  Room:   2060/2060-01  Code Status:    Full Code    Chief Complaint:     Chief Complaint   Patient presents with    Fall       History Obtained From:     patient    History of Present Illness:     The patient is a 63 y.o.  Non- / non  male who presents with Fall   and he is admitted to the hospital for the management of      The patient is 63-year-old male with multiple comorbidities include history of liver cirrhosis secondary to alcohol use and hepatitis C, history of TIPS, hepatitis C treated esophageal cancer, history of dysphagia s/p PEG follows up with oncology currently in remission , recently had EGD on 1/9/20/2024 did not show any evidence of recurrence,, presented to the ER with generalized weakness and fatigue and difficulty walking.  Patient states that he feels that he has some electrolyte abnormalities, patient does get hypomagnesemia and hypokalemia, patient states that he feels like his potassium and magnesium are getting too low that is causing him difficulty walking and falls,  Patient states that he is at couple of falls lately,  Came to ER, was found to be mildly hypokalemic and hypomagnesemic, electrolytes replaced, patient had MRI ordered, Dr. Cueva was already consulted from the ER  Patient was seen and examined on the floor, found to be in A-fib with RVR initially with a heart rate in 120,  New onset  Patient complaining of dizziness generalized weakness and fatigue and difficulty walking, attributing symptoms to underlying electrolyte abnormality, no palpitations chest pain or shortness of breath  4/29   Patient, very sleepy, very weak  Eval for by neurologist  Underwent MRI which was negative  Still 
    [facility]   IN-PATIENT SERVICE   [facility]    Progress note            Date:   4/25/2024  Patient name:  Frank Lisa  Date of admission:  4/23/2024  8:29 PM  MRN:   [id number]  Account:  [id number]  YOB: 1960  PCP:    Lopez Rosario PA  Room:   2060/2060-01  Code Status:    Full Code    Chief Complaint:     Chief Complaint   Patient presents with    Fall       History Obtained From:     patient    History of Present Illness:     The patient is a 63 y.o.  Non- / non  male who presents with Fall   and he is admitted to the hospital for the management of      The patient is 63-year-old male with multiple comorbidities include history of liver cirrhosis secondary to alcohol use and hepatitis C, history of TIPS, hepatitis C treated esophageal cancer, history of dysphagia s/p PEG follows up with oncology currently in remission , recently had EGD on 1/9/20/2024 did not show any evidence of recurrence,, presented to the ER with generalized weakness and fatigue and difficulty walking.  Patient states that he feels that he has some electrolyte abnormalities, patient does get hypomagnesemia and hypokalemia, patient states that he feels like his potassium and magnesium are getting too low that is causing him difficulty walking and falls,  Patient states that he is at couple of falls lately,  Came to ER, was found to be mildly hypokalemic and hypomagnesemic, electrolytes replaced, patient had MRI ordered, Dr. Cueva was already consulted from the ER  Patient was seen and examined on the floor, found to be in A-fib with RVR initially with a heart rate in 120,  New onset  Patient complaining of dizziness generalized weakness and fatigue and difficulty walking, attributing symptoms to underlying electrolyte abnormality, no palpitations chest pain or shortness of breath    Past Medical History:     Past Medical History:   Diagnosis Date    Abnormal EKG     Acute deep vein 
    [facility]   IN-PATIENT SERVICE   [facility]    Progress note            Date:   4/30/2024  Patient name:  Frank Lisa  Date of admission:  4/23/2024  8:29 PM  MRN:   [id number]  Account:  [id number]  YOB: 1960  PCP:    Lopez Rosario PA  Room:   2060/2060-  Code Status:    Full Code    Chief Complaint:     Chief Complaint   Patient presents with    Fall       History Obtained From:     patient    History of Present Illness:     The patient is a 63 y.o.  Non- / non  male who presents with Fall   and he is admitted to the hospital for the management of      The patient is 63-year-old male with multiple comorbidities include history of liver cirrhosis secondary to alcohol use and hepatitis C, history of TIPS, hepatitis C treated esophageal cancer, history of dysphagia s/p PEG follows up with oncology currently in remission , recently had EGD on 1/9/20/2024 did not show any evidence of recurrence,, presented to the ER with generalized weakness and fatigue and difficulty walking.  Patient states that he feels that he has some electrolyte abnormalities, patient does get hypomagnesemia and hypokalemia, patient states that he feels like his potassium and magnesium are getting too low that is causing him difficulty walking and falls,  Patient states that he is at couple of falls lately,  Came to ER, was found to be mildly hypokalemic and hypomagnesemic, electrolytes replaced, patient had MRI ordered, Dr. Cueva was already consulted from the ER  Patient was seen and examined on the floor, found to be in A-fib with RVR initially with a heart rate in 120,  New onset  Patient complaining of dizziness generalized weakness and fatigue and difficulty walking, attributing symptoms to underlying electrolyte abnormality, no palpitations chest pain or shortness of breath  4/29   Patient, very sleepy, very weak  Eval for by neurologist  Underwent MRI which was negative  Still 
    [facility]   IN-PATIENT SERVICE   [facility]    Progress note            Date:   4/28/2024  Patient name:  Frank Lisa  Date of admission:  4/23/2024  8:29 PM  MRN:   [id number]  Account:  [id number]  YOB: 1960  PCP:    Lopez Rosario PA  Room:   2060/2060-01  Code Status:    Full Code    Chief Complaint:     Chief Complaint   Patient presents with    Fall       History Obtained From:     patient    History of Present Illness:     The patient is a 63 y.o.  Non- / non  male who presents with Fall   and he is admitted to the hospital for the management of      The patient is 63-year-old male with multiple comorbidities include history of liver cirrhosis secondary to alcohol use and hepatitis C, history of TIPS, hepatitis C treated esophageal cancer, history of dysphagia s/p PEG follows up with oncology currently in remission , recently had EGD on 1/9/20/2024 did not show any evidence of recurrence,, presented to the ER with generalized weakness and fatigue and difficulty walking.  Patient states that he feels that he has some electrolyte abnormalities, patient does get hypomagnesemia and hypokalemia, patient states that he feels like his potassium and magnesium are getting too low that is causing him difficulty walking and falls,  Patient states that he is at couple of falls lately,  Came to ER, was found to be mildly hypokalemic and hypomagnesemic, electrolytes replaced, patient had MRI ordered, Dr. Cueva was already consulted from the ER  Patient was seen and examined on the floor, found to be in A-fib with RVR initially with a heart rate in 120,  New onset  Patient complaining of dizziness generalized weakness and fatigue and difficulty walking, attributing symptoms to underlying electrolyte abnormality, no palpitations chest pain or shortness of breath    Past Medical History:     Past Medical History:   Diagnosis Date    Abnormal EKG     Acute deep vein 
    [facility]   IN-PATIENT SERVICE   [facility]    Progress note            Date:   4/27/2024  Patient name:  Frank Lisa  Date of admission:  4/23/2024  8:29 PM  MRN:   [id number]  Account:  [id number]  YOB: 1960  PCP:    Lopez Rosario PA  Room:   2060/2060-01  Code Status:    Full Code    Chief Complaint:     Chief Complaint   Patient presents with    Fall       History Obtained From:     patient    History of Present Illness:     The patient is a 63 y.o.  Non- / non  male who presents with Fall   and he is admitted to the hospital for the management of      The patient is 63-year-old male with multiple comorbidities include history of liver cirrhosis secondary to alcohol use and hepatitis C, history of TIPS, hepatitis C treated esophageal cancer, history of dysphagia s/p PEG follows up with oncology currently in remission , recently had EGD on 1/9/20/2024 did not show any evidence of recurrence,, presented to the ER with generalized weakness and fatigue and difficulty walking.  Patient states that he feels that he has some electrolyte abnormalities, patient does get hypomagnesemia and hypokalemia, patient states that he feels like his potassium and magnesium are getting too low that is causing him difficulty walking and falls,  Patient states that he is at couple of falls lately,  Came to ER, was found to be mildly hypokalemic and hypomagnesemic, electrolytes replaced, patient had MRI ordered, Dr. Cueva was already consulted from the ER  Patient was seen and examined on the floor, found to be in A-fib with RVR initially with a heart rate in 120,  New onset  Patient complaining of dizziness generalized weakness and fatigue and difficulty walking, attributing symptoms to underlying electrolyte abnormality, no palpitations chest pain or shortness of breath    Past Medical History:     Past Medical History:   Diagnosis Date    Abnormal EKG     Acute deep vein 
   04/24/24 1608   Encounter Summary   Encounter Overview/Reason Initial Encounter   Service Provided For Patient not available  (Staff with PT)       
   04/26/24 1315   Encounter Summary   Encounter Overview/Reason Volunteer Encounter   Plan and Referrals   Plan/Referrals Provided reading/devotional materials  (prayer card given)       
   04/28/24 1512   Encounter Summary   Encounter Overview/Reason Volunteer Encounter   Service Provided For Patient   Referral/Consult From Rounding   Last Encounter  04/28/24   Complexity of Encounter Low   Spiritual/Emotional needs   Type Spiritual Support   Rituals, Rites and Sacraments   Type Rastafari Communion       
   04/29/24 1222   Encounter Summary   Encounter Overview/Reason  Encounter   Service Provided For Patient   Referral/Consult From Rounding   Last Encounter  04/29/24   Complexity of Encounter Low   Spiritual/Emotional needs   Type Spiritual Support   Rituals, Rites and Sacraments   Type Blessings       
   04/29/24 1238   Encounter Summary   Encounter Overview/Reason Attempted Encounter;Volunteer Encounter   Service Provided For Patient not available  (pt sleeping)       
   05/02/24 1550   Encounter Summary   Encounter Overview/Reason Volunteer Encounter   Service Provided For Patient   Referral/Consult From Rounding       
   05/02/24 1628   Encounter Summary   Encounter Overview/Reason  Encounter   Service Provided For Patient   Referral/Consult From Rounding   Last Encounter  05/02/24   Complexity of Encounter Low   Spiritual/Emotional needs   Type Spiritual Support   Rituals, Rites and Sacraments   Type Sacrament of Sick       
  [facility]   IN-PATIENT SERVICE      NEUROLOGY PROGRESS  NOTE            Date:   4/26/2024  Patient name:  Frank Lisa  Date of admission:  4/23/2024  YOB: 1960      Interval History:     Overnight, he was noted to be confused, hallucinating.  Throughout the day, he has continued to be agitated, sitter in place.  Has received Haldol.  At time of my exam, he appears restless, trying to leave the room.  Nursing at bedside.    I called and obtained further history from his ex-wife over phone.  She states he has no prior issues with cognitive impairment, is at baseline fully oriented and independent.  However, she does report that he in fact continues to drink and she has found empty beer cans in his house when she goes to help him clean.  She states that the drinking is less than before but he still drinks daily.  He has had previous withdrawals, most recently she states was about 6 months ago.    History of Present Illness:     63-year-old male with past medical history of esophageal cancer status post chemoradiation-now in remission, history of alcoholic cirrhosis, peripheral polyneuropathy, suspected chemotherapy-induced, spinal stenosis, lumbar radiculopathy, who initially presented with generalized weakness, fatigue, gait imbalance.  Neurology consulted for altered mentation.  History obtained from patient and chart review. Patient is a poor historian.     Patient states that when he has electrolyte abnormalities, particularly hypomagnesemia and hypokalemia, he feels overall fatigue, dizzy (described as positional lightheadedness), gait imbalance and has had falls. This occurs when he gets dehydrated (at times after frequent bowel movements from lactulose).  At baseline, he does not use an assistive device although when he does have these electrolyte abnormalities, he has to use a walker.  He states the symptoms can sometimes last for days.  He has baseline peripheral polyneuropathy, he 
  Physician Progress Note      PATIENT:               ALHAJI COOLEY  CSN #:                  [id number]  :                       1960  ADMIT DATE:       2024 8:29 PM  DISCH DATE:  RESPONDING  PROVIDER #:        Kathi Russo MD        QUERY TEXT:    Type of Anemia: Please provide further specificity, if known.    Clinical indicators include:  Options provided:  -- Anemia due to acute blood loss  -- Anemia due to chronic blood loss  -- Anemia due to iron deficiency  -- Anemia due to postoperative blood loss  -- Anemia due to chronic disease  -- Other - I will add my own diagnosis  -- Disagree - Not applicable / Not valid  -- Disagree - Clinically Unable to determine / Unknown        PROVIDER RESPONSE TEXT:    The patient has anemia due to acute blood loss.      Electronically signed by:  Kathi Russo MD 2024 2:50 PM          
  [facility]   IN-PATIENT SERVICE      NEUROLOGY PROGRESS  NOTE            Date:   4/28/2024  Patient name:  Frank Lisa  Date of admission:  4/23/2024  YOB: 1960      Interval History:     4/28: Clinically much improved per nursing staff.  Did not require Ativan today.  Even does not have a bedside sitter currently.  He is more calm.  Neurologically stable.    4/27: More calm today.  No further hallucinations.  Started on CIWA protocol.  Seen while laying in bed.  Oriented to person, place, time.  No new neurological changes.  Per nursing staff, ex-wife reported he has had multiple similar previous presentations in setting of alcohol withdrawals.  Has been having loose stools today.    4/26: Overnight, he was noted to be confused, hallucinating.  Throughout the day, he has continued to be agitated, sitter in place.  Has received Haldol.  At time of my exam, he appears restless, trying to leave the room.  Nursing at bedside.    I called and obtained further history from his ex-wife over phone.  She states he has no prior issues with cognitive impairment, is at baseline fully oriented and independent.  However, she does report that he in fact continues to drink and she has found empty beer cans in his house when she goes to help him clean.  She states that the drinking is less than before but he still drinks daily.  He has had previous withdrawals, most recently she states was about 6 months ago.    History of Present Illness:     63-year-old male with past medical history of esophageal cancer status post chemoradiation-now in remission, history of alcoholic cirrhosis, peripheral polyneuropathy, suspected chemotherapy-induced, spinal stenosis, lumbar radiculopathy, who initially presented with generalized weakness, fatigue, gait imbalance.  Neurology consulted for altered mentation.  History obtained from patient and chart review. Patient is a poor historian.     Patient states that when he 
 discontinued. Pt is slying in bed eyes closed, calm and cooperative at this time.  
Called to evaluate patient as the patient is getting agitated, is confused, not decisional, tells me that he is always at the hospital as he lives on [address], has visited the hospital 3 times in the last week which is not correct.  Also cannot tell me why he is in the hospital.  Mentions that he has been held back for 3 hours while all he wants to do is to go outside and walk with his girlfriend.  On discussing with nursing staff this is not correct, he did not have anyone visit today.  He is abusing paramedical staff, he is still very unsteady despite the walker.  For now, will place for prn haldol.  Patient is not decisional, high risk of falling.  If no improvement with Haldol we will consider moving to the ICU for Precedex gtt.    When seen patient confused and verbally abusive,   
[facility]   INPATIENT OCCUPATIONAL THERAPY  PROGRESS NOTE  Date: 2024  Patient Name: Frank Lisa       Room:   MRN: [id number]    : 1960  (63 y.o.)  Gender: male     Discharge Recommendations:  Further Occupational Therapy is recommended upon facility discharge.    OT Equipment Recommendations  Equipment Needed:  (CTA)    Restrictions/Precautions  Restrictions/Precautions  Restrictions/Precautions: Fall Risk;Up as Tolerated;General Precautions  Required Braces or Orthoses?: No      Subjective  Subjective  Subjective: Pt supine upon therapist arrival agreeable to therapy  Subjective  Pain: Pt reports pain in stomach and R ankle - no numeric value given  Comments: OK for OT tx per RN, Deanna.    Objective  Orientation  Overall Orientation Status: Within Functional Limits  Orientation Level: Oriented to place;Oriented to time;Oriented to person  Cognition  Overall Cognitive Status: Exceptions  Arousal/Alertness: Appropriate responses to stimuli  Following Commands: Inconsistently follows commands;Follows one step commands with repetition  Attention Span: Attends with cues to redirect  Memory: Decreased recall of precautions;Decreased short term memory  Safety Judgement: Decreased awareness of need for assistance;Decreased awareness of need for safety  Problem Solving: Assistance required to identify errors made;Assistance required to correct errors made;Decreased awareness of errors  Insights: Decreased awareness of deficits  Initiation: Requires cues for all  Sequencing: Requires cues for all  Cognition Comment:  (Pt impulsive, displays poor safety awareness, variable ability to follow commands)    Activities of Daily Living  ADL  Feeding: Setup  Feeding Skilled Clinical Factors: Pt set up with lunch tray at bed. Pt reporting not wanting to eat lunch.  Grooming: Minimal assistance  Grooming Skilled Clinical Factors: Attempted to have pt engage in hygiene/grooming 
Comprehensive Nutrition Assessment    Type and Reason for Visit:  Positive Nutrition Screen (wt loss, poor appetite)    Nutrition Recommendations/Plan:   Will monitor intake of Regular diet and Ensure Enlive all trays     Malnutrition Assessment:  Malnutrition Status:  At risk for malnutrition (Comment) (04/25/24 1432)    Context:  Acute Illness     Findings of the 6 clinical characteristics of malnutrition:  Energy Intake:  Unable to assess  Weight Loss:  No significant weight loss     Body Fat Loss:  Unable to assess     Muscle Mass Loss:  Unable to assess    Fluid Accumulation:  No significant fluid accumulation     Strength:  Not Performed    Nutrition Assessment:    Pt was admitted after a fall. Xray has been taken of foot/ankle. Pt has a h/o esophageal Ca which is now in remission. Pt required a PEG due to dysphagia but this is scheduled to be removed in the near future. Review of wt history shows 6# wt gain over the past year (based on stated admit wt).    Nutrition Related Findings:    no edema, Labs: Glu 130 , Meds: Lactulose, PMH: ETOH Cirrhosis, Esophageal Ca/Dysphagia, PEG Wound Type: None       Current Nutrition Intake & Therapies:    Average Meal Intake: Unable to assess     ADULT DIET; Regular  ADULT ORAL NUTRITION SUPPLEMENT; Breakfast, Lunch, Dinner; Standard High Calorie/High Protein Oral Supplement    Anthropometric Measures:  Height: 177.8 cm (5' 10\")  Ideal Body Weight (IBW): 166 lbs (75 kg)    Admission Body Weight: 81.6 kg (180 lb)  Current Body Weight: 81.6 kg (180 lb), 108.4 % IBW. Weight Source: Stated  Current BMI (kg/m2): 25.8  Usual Body Weight: 78.9 kg (174 lb)  % Weight Change (Calculated): 3.4                    BMI Categories: Overweight (BMI 25.0-29.9)    Estimated Daily Nutrient Needs:  Energy Requirements Based On: Formula  Weight Used for Energy Requirements: Admission  Energy (kcal/day): [place] x 1.2= 2000 kcal  Weight Used for Protein Requirements: Admission  Protein 
Comprehensive Nutrition Assessment    Type and Reason for Visit:  Reassess    Nutrition Recommendations/Plan:   Will continue Regular diet and change supplements to Magic Cup twice daily     Malnutrition Assessment:  Malnutrition Status:  At risk for malnutrition (Comment) (04/25/24 7352)    Context:  Acute Illness     Findings of the 6 clinical characteristics of malnutrition:  Energy Intake:  Unable to assess  Weight Loss:  No significant weight loss     Body Fat Loss:  Unable to assess     Muscle Mass Loss:  Unable to assess    Fluid Accumulation:  No significant fluid accumulation     Strength:  Not Performed    Nutrition Assessment:    Observed pt's lunch tray- consumed 25% of potatoes served, no supplements. Nursing documents intake up to 25%. Current intake is NOT adequate. Plan is for ECF at discharge.    Nutrition Related Findings:    trace RLE edema, Labs: Reviewed, Meds: Lactulose, Lactobacillus, BM 4/28 Wound Type: None       Current Nutrition Intake & Therapies:    Average Meal Intake: 1-25%, 51-75%  Average Supplements Intake: 0%  ADULT DIET; Regular  ADULT ORAL NUTRITION SUPPLEMENT; Breakfast, Lunch, Dinner; Standard High Calorie/High Protein Oral Supplement    Anthropometric Measures:  Height: 177.8 cm (5' 10\")  Ideal Body Weight (IBW): 166 lbs (75 kg)    Admission Body Weight: 81.6 kg (180 lb)  Current Body Weight: 81.6 kg (180 lb), 108.4 % IBW. Weight Source: Stated  Current BMI (kg/m2): 25.8  Usual Body Weight: 78.9 kg (174 lb)  % Weight Change (Calculated): 3.4                    BMI Categories: Overweight (BMI 25.0-29.9)    Estimated Daily Nutrient Needs:  Energy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 metabolic encephalopathy     Lezama's esophagus with dysplasia     Mastoid disorder, bilateral     Acute encephalopathy     Acute deep vein thrombosis (DVT) of brachial vein of right upper extremity (HCC)     Chronic hepatitis C without hepatic coma (HCC)     Swelling of joint of upper arm, right     Anasarca     Lightheadedness     Alcohol withdrawal syndrome, with delirium (HCC)     Hemorrhage of rectum and anus     Bowel perforation (HCC)     Electrolyte 
care planning)     Palliative care encounter     S/P TIPS (transjugular intrahepatic portosystemic shunt)     Acute metabolic encephalopathy     Lezama's esophagus with dysplasia     Mastoid disorder, bilateral     Acute deep vein thrombosis (DVT) of brachial vein of right upper extremity (HCC)     Chronic hepatitis C without hepatic coma (HCC)     Swelling of joint of upper arm, right     Anasarca     Lightheadedness     Alcohol withdrawal syndrome, with delirium (HCC)     Hemorrhage of rectum and anus     Bowel perforation (HCC)     Electrolyte imbalance     Hyponatremia     Pain of upper abdomen     Ileus (HCC)     Moderate malnutrition (HCC)     Delirium due to another medical condition     ROBYN (acute kidney injury) (HCC)     Ileostomy in place (HCC)     Peristomal dermatitis associated with moisture     Cellulitis     Coffee ground emesis     Substance abuse (HCC)     History of abdominal surgery     Status post reversal of ileostomy     Neck pain, chronic     Neuropathy     Ulnar neuropathy of both upper extremities     Intractable nausea and vomiting     Right lower quadrant abdominal pain     Nausea     Elevated CEA     Abnormal abdominal CT scan     Generalized weakness     High carcinoembryonic antigen (CEA)     Malignant neoplasm of esophagus (HCC)     Small bowel obstruction (HCC)      Plan of Treatment:   Medications reviewed  1: A-fib, change Corgard to metoprolol to tartrate 25 mg twice daily  2: Continue subcu Lovenox 40 mg once a day  3: Continue with other medication, replace magnesium and potassium  Continue ECG monitor  Patient is reluctant to take any anticoagulant previous history of heavy GI bleeding  Discussed with the patient he agrees for blood  Check BMP and magnesium and potassium  If continues to have A-fib will try flecainide 50 mg twice daily once potassium and magnesium are within normal range and QT interval is normal  Repeat twelve-lead ECG once magnesium and potassium levels are 
deficiency     Pure hypercholesterolemia     Hypokalemia     Essential hypertension     Recurrent major depressive disorder in partial remission (HCC)     S/P epidural steroid injection     Elevated LFTs     Seasonal allergies     Lumbar facet arthropathy     Cervical facet syndrome     Thrombocytopenia (HCC)     Hepatitis C virus infection resolved after antiviral drug therapy     Alcohol abuse     Altered mental status     Hypocalcemia     Hypophosphatemia     Malignant neoplasm of lower third of esophagus (HCC)     COVID-19     Anxiety     Current smoker     Severe comorbid illness     Gait instability     Abnormal findings on diagnostic imaging of spine     Foraminal stenosis of cervical region     Spinal stenosis of lumbar region with neurogenic claudication     Low hemoglobin     Anemia     Hypotension     Former smoker, 50+ pack years, quit 2016     HLD (hyperlipidemia)     Esophageal adenocarcinoma (HCC)     Acute on chronic anemia     Acute kidney injury (HCC)     Ascites due to alcoholic cirrhosis (HCC)     Shortness of breath     Drop in hemoglobin     Esophageal polyp     Acute kidney failure, unspecified (HCC)     Muscle weakness (generalized)     Other abnormalities of gait and mobility     GI bleed     Goals of care, counseling/discussion     ACP (advance care planning)     Palliative care encounter     S/P TIPS (transjugular intrahepatic portosystemic shunt)     Acute metabolic encephalopathy     Lezama's esophagus with dysplasia     Mastoid disorder, bilateral     Acute encephalopathy     Acute deep vein thrombosis (DVT) of brachial vein of right upper extremity (HCC)     Chronic hepatitis C without hepatic coma (HCC)     Swelling of joint of upper arm, right     Anasarca     Lightheadedness     Alcohol withdrawal syndrome, with delirium (HCC)     Hemorrhage of rectum and anus     Bowel perforation (HCC)     Electrolyte imbalance     Hyponatremia     Pain of upper abdomen     Ileus (HCC)     
pain.    Vision  Vision: Impaired  Vision Exceptions: Wears glasses for reading  Hearing  Hearing: Exceptions to WFL  Hearing Exceptions: Hard of hearing/hearing concerns;Bilateral hearing aid (hearing aides are at home)  Cognition  Overall Cognitive Status: Exceptions  Arousal/Alertness: Appropriate responses to stimuli  Following Commands: Inconsistently follows commands;Follows one step commands with repetition  Attention Span: Attends with cues to redirect  Memory: Decreased recall of precautions  Safety Judgement: Decreased awareness of need for assistance;Decreased awareness of need for safety  Problem Solving: Assistance required to identify errors made;Assistance required to correct errors made;Decreased awareness of errors  Insights: Decreased awareness of deficits  Initiation: Requires cues for all  Sequencing: Requires cues for all  Cognition Comment: Pt demo unsafe behaviors, not aware of how to correct them needing cues and A.  Orientation  Overall Orientation Status: Within Functional Limits  Orientation Level: Oriented X4    Education Given To: Patient  Education Provided: Role of Therapy;Plan of Care;Transfer Training;Fall Prevention Strategies;Energy Conservation;Equipment;IADL Safety  Education Provided Comments: OT role, POC, safety, bed mob, transfer training, ECT, AD safety, fall prevention, pacing, call light use, importance of OOB activity, nutrition, importance of continued mobility.  Education Method: Verbal;Demonstration  Barriers to Learning: Hearing  Education Outcome: Continued education needed;Verbalized understanding     AM-PAC - ADL  AM-PAC Daily Activity - Inpatient   How much help is needed for putting on and taking off regular lower body clothing?: A Little  How much help is needed for bathing (which includes washing, rinsing, drying)?: A Little  How much help is needed for toileting (which includes using toilet, bedpan, or urinal)?: A Little  How much help is needed for putting on 
significant disc protrusion, spinal canal stenosis or  neural foraminal narrowing.    C5-C6: There is reduction in disc height, disc desiccation and diffuse  posterior disc bulge.  There is mild canal stenosis and moderate bilateral  foraminal narrowing.    C6-C7: There is reduction in disc height, disc desiccation and diffuse  posterior disc bulge.  There is mild canal stenosis.  There is moderate  left-sided and severe right-sided foraminal narrowing.    C7-T1: There is no significant disc protrusion, spinal canal stenosis or  neural foraminal narrowing.    Impression  1. Cervical spinal cord is normal in signal intensity.  2. C5-C6: Mild canal stenosis and moderate bilateral foraminal narrowing.  3. C6-C7: Mild canal stenosis, moderate left-sided and severe right-sided  foraminal narrowing.    Results for orders placed during the hospital encounter of 04/23/24    CT HEAD WO CONTRAST    Narrative  EXAMINATION:  CT OF THE HEAD WITHOUT CONTRAST  4/24/2024 5:40 pm    TECHNIQUE:  CT of the head was performed without the administration of intravenous  contrast. Automated exposure control, iterative reconstruction, and/or weight  based adjustment of the mA/kV was utilized to reduce the radiation dose to as  low as reasonably achievable.    COMPARISON:  None.    HISTORY:  ORDERING SYSTEM PROVIDED HISTORY: falls  TECHNOLOGIST PROVIDED HISTORY:  falls    Reason for Exam: fall, ams    FINDINGS:  BRAIN/VENTRICLES: There is no acute intracranial hemorrhage, mass effect or  midline shift.  No abnormal extra-axial fluid collection.  The gray-white  differentiation is maintained without evidence of an acute infarct.  There is  no evidence of hydrocephalus.    ORBITS: The visualized portion of the orbits demonstrate no acute abnormality.    SINUSES: The visualized paranasal sinuses and mastoid air cells demonstrate  no acute abnormality.  Mucous retention cysts are seen in the maxillary  sinuses bilaterally.    SOFT 
therapy  Patient, is agreeable for SNF placement, will work in direction of placement  Will have rheumatology evaluation outpatient  On diuretics  Finish course of antibiotics  Consultations:   IP CONSULT TO HOSPITALIST  IP CONSULT TO ORTHOPEDIC SURGERY  IP CONSULT TO CARDIOLOGY  IP CONSULT TO NEUROLOGY  IP CONSULT TO GI     Patient is admitted as inpatient status because of co-morbidities listed above, severity of signs and symptoms as outlined, requirement for current medical therapies and most importantly because of direct risk to patient if care not provided in a hospital setting.    Roberto Workman MD  4/29/2024  10:50 AM    Copy sent to Dr. Rosario, Lopez DIOR PA    Please note that this chart was generated using voice recognition Dragon dictation software.  Although every effort was made to ensure the accuracy of this automated transcription, some errors in transcription may have occurred.   
FOOT RIGHT (2 VIEWS)  Narrative: EXAMINATION:  TWO XRAY VIEWS OF THE RIGHT FOOT    4/25/2024 12:33 pm    COMPARISON:  None.    HISTORY:  ORDERING SYSTEM PROVIDED HISTORY: swelling  TECHNOLOGIST PROVIDED HISTORY:  swelling  Reason for Exam: Pt states IV in hand having pain and swelling since. Pt  states he had fall big toe bruising    FINDINGS:  The joints are normal in alignment.  There are no fractures.  No lytic or  blastic lesions are seen.  Severe degenerative changes are seen at the 1st  metatarsophalangeal joint.  Healed fracture is seen in the 2nd metatarsal  bone.  Soft tissues unremarkable.  No radiopaque foreign bodies are seen.  Impression: 1. No acute fracture or dislocation.  2. Severe degenerative changes at the 1st metatarsophalangeal joint.  Echo (TTE) complete (PRN contrast/bubble/strain/3D)    Left Ventricle: Normal left ventricular systolic function with a   visually estimated EF of 55 - 60%. Left ventricle size is normal.   Moderately increased wall thickness. Normal wall motion.    Right Ventricle: Right ventricle size is normal. Normal systolic   function.    Aortic Valve: Valve structure is normal. No regurgitation.    Mitral Valve: Thickened leaflet. Mild regurgitation.    Tricuspid Valve: Valve structure is normal. Trace regurgitation.    Left Atrium: Left atrium is dilated.    Interatrial Septum: No interatrial shunt visualized with color Doppler.    Right Atrium: Right atrium size is normal.    Aorta: Normal sized aortic root.    Pericardium: Small (<1 cm) pericardial effusion present.    Image quality is fair.       Assessment :      Primary Problem  Hypokalemia    Active Hospital Problems    Diagnosis Date Noted    Acute encephalopathy [G93.40] 01/01/2023     Priority: Medium    Wrist arthritis [M19.039] 04/25/2024    Pain in joint involving right ankle and foot [M25.571] 04/25/2024    Peripheral polyneuropathy [G62.9] 11/28/2023    Hypokalemia [E87.6] 02/04/2019       Plan:     Patient

## 2024-05-02 NOTE — PLAN OF CARE
Problem: ABCDS Injury Assessment  Goal: Absence of physical injury  4/30/2024 1751 by Amy Lopez, RN  Outcome: Progressing  Note: Patient remains free of incidence/ injury. Bed remains in low position. Side rails up x2.       Problem: Pain  Goal: Verbalizes/displays adequate comfort level or baseline comfort level  4/30/2024 1751 by Amy Lopez, RN  Outcome: Progressing  Note: Patient denies need for prn pain medication this shift.      Problem: Skin/Tissue Integrity  Goal: Absence of new skin breakdown  Description: 1.  Monitor for areas of redness and/or skin breakdown  2.  Assess vascular access sites hourly  3.  Every 4-6 hours minimum:  Change oxygen saturation probe site  4.  Every 4-6 hours:  If on nasal continuous positive airway pressure, respiratory therapy assess nares and determine need for appliance change or resting period.  4/30/2024 1751 by Amy Lopez, RN  Outcome: Progressing  Note: No new occurrence of skin breakdown noted during this shift.     
  Problem: Discharge Planning  Goal: Discharge to home or other facility with appropriate resources  4/25/2024 1514 by Jocelyn German RN  Outcome: Progressing     Problem: ABCDS Injury Assessment  Goal: Absence of physical injury  4/25/2024 1514 by Jocelyn German RN  Outcome: Progressing     Problem: Safety - Adult  Goal: Free from fall injury  4/25/2024 1514 by Jocelyn German RN  Outcome: Progressing     Problem: Pain  Goal: Verbalizes/displays adequate comfort level or baseline comfort level  4/25/2024 1514 by Jocelyn German RN  Outcome: Progressing     Problem: Nutrition Deficit:  Goal: Optimize nutritional status  Outcome: Progressing     
  Problem: Discharge Planning  Goal: Discharge to home or other facility with appropriate resources  4/26/2024 0234 by Jose Delgado RN  Outcome: Progressing  Flowsheets (Taken 4/25/2024 1950)  Discharge to home or other facility with appropriate resources: Identify barriers to discharge with patient and caregiver     Problem: ABCDS Injury Assessment  Goal: Absence of physical injury  4/26/2024 0234 by Jose Delgado RN  Outcome: Progressing  Flowsheets (Taken 4/25/2024 2007)  Absence of Physical Injury: Implement safety measures based on patient assessment     Problem: Safety - Adult  Goal: Free from fall injury  4/26/2024 0234 by Jose Delgado RN  Outcome: Progressing  Flowsheets (Taken 4/25/2024 2007)  Free From Fall Injury: Instruct family/caregiver on patient safety     Problem: Pain  Goal: Verbalizes/displays adequate comfort level or baseline comfort level  4/26/2024 0234 by Jose Delgado RN  Outcome: Progressing     Problem: Nutrition Deficit:  Goal: Optimize nutritional status  4/26/2024 0234 by Jose Delgado RN  Outcome: Progressing     Problem: Musculoskeletal - Adult  Goal: Return mobility to safest level of function  Outcome: Progressing  Flowsheets (Taken 4/25/2024 1950)  Return Mobility to Safest Level of Function: Assist with transfers and ambulation using safe patient handling equipment as needed     Problem: Gastrointestinal - Adult  Goal: Minimal or absence of nausea and vomiting  Outcome: Progressing  Flowsheets (Taken 4/25/2024 1950)  Minimal or absence of nausea and vomiting: Administer ordered antiemetic medications as needed     Problem: Metabolic/Fluid and Electrolytes - Adult  Goal: Electrolytes maintained within normal limits  Outcome: Progressing  Flowsheets (Taken 4/25/2024 1950)  Electrolytes maintained within normal limits:   Monitor labs and assess patient for signs and symptoms of electrolyte imbalances   Administer electrolyte replacement as ordered     
  Problem: Discharge Planning  Goal: Discharge to home or other facility with appropriate resources  4/26/2024 1422 by Deanna Blood RN  Outcome: Progressing  Flowsheets (Taken 4/26/2024 0911)  Discharge to home or other facility with appropriate resources:   Arrange for needed discharge resources and transportation as appropriate   Identify barriers to discharge with patient and caregiver   Identify discharge learning needs (meds, wound care, etc)   Refer to discharge planning if patient needs post-hospital services based on physician order or complex needs related to functional status, cognitive ability or social support system  4/26/2024 0234 by Jose Delgado RN  Outcome: Progressing  Flowsheets (Taken 4/25/2024 1950)  Discharge to home or other facility with appropriate resources: Identify barriers to discharge with patient and caregiver     Problem: ABCDS Injury Assessment  Goal: Absence of physical injury  4/26/2024 1422 by Deanna Blood RN  Outcome: Progressing  4/26/2024 0234 by Jose Delgado RN  Outcome: Progressing  Flowsheets (Taken 4/25/2024 2007)  Absence of Physical Injury: Implement safety measures based on patient assessment     Problem: Safety - Adult  Goal: Free from fall injury  4/26/2024 1422 by Deanna Blood RN  Outcome: Progressing  4/26/2024 0234 by oJse Delgado RN  Outcome: Progressing  Flowsheets (Taken 4/25/2024 2007)  Free From Fall Injury: Instruct family/caregiver on patient safety     Problem: Pain  Goal: Verbalizes/displays adequate comfort level or baseline comfort level  4/26/2024 1422 by Deanna Blood RN  Outcome: Progressing  4/26/2024 0234 by Jose Delgado RN  Outcome: Progressing     Problem: Nutrition Deficit:  Goal: Optimize nutritional status  4/26/2024 1422 by Deanna Blood RN  Outcome: Progressing  4/26/2024 0234 by Jose Delgado RN  Outcome: Progressing     Problem: Musculoskeletal - Adult  Goal: Return mobility to safest level of function  4/26/2024 1422 by 
  Problem: Discharge Planning  Goal: Discharge to home or other facility with appropriate resources  Outcome: Progressing     Problem: ABCDS Injury Assessment  Goal: Absence of physical injury  Outcome: Progressing     Problem: Safety - Adult  Goal: Free from fall injury  Outcome: Progressing     Problem: Pain  Goal: Verbalizes/displays adequate comfort level or baseline comfort level  Outcome: Progressing     
  Problem: Discharge Planning  Goal: Discharge to home or other facility with appropriate resources  Outcome: Progressing     Problem: ABCDS Injury Assessment  Goal: Absence of physical injury  Outcome: Progressing     Problem: Safety - Adult  Goal: Free from fall injury  Outcome: Progressing     Problem: Pain  Goal: Verbalizes/displays adequate comfort level or baseline comfort level  Outcome: Progressing     Problem: Nutrition Deficit:  Goal: Optimize nutritional status  Outcome: Progressing     Problem: Musculoskeletal - Adult  Goal: Return mobility to safest level of function  Outcome: Progressing     Problem: Gastrointestinal - Adult  Goal: Minimal or absence of nausea and vomiting  Outcome: Progressing     Problem: Metabolic/Fluid and Electrolytes - Adult  Goal: Electrolytes maintained within normal limits  Outcome: Progressing     Problem: Skin/Tissue Integrity  Goal: Absence of new skin breakdown  Description: 1.  Monitor for areas of redness and/or skin breakdown  2.  Assess vascular access sites hourly  3.  Every 4-6 hours minimum:  Change oxygen saturation probe site  4.  Every 4-6 hours:  If on nasal continuous positive airway pressure, respiratory therapy assess nares and determine need for appliance change or resting period.  Outcome: Progressing     
  Problem: Discharge Planning  Goal: Discharge to home or other facility with appropriate resources  Outcome: Progressing  Flowsheets (Taken 4/27/2024 1923)  Discharge to home or other facility with appropriate resources: Identify barriers to discharge with patient and caregiver     Problem: ABCDS Injury Assessment  Goal: Absence of physical injury  Outcome: Progressing  Flowsheets (Taken 4/27/2024 2000)  Absence of Physical Injury: Implement safety measures based on patient assessment     Problem: Safety - Adult  Goal: Free from fall injury  Outcome: Progressing  Flowsheets (Taken 4/27/2024 2000)  Free From Fall Injury: Instruct family/caregiver on patient safety     Problem: Pain  Goal: Verbalizes/displays adequate comfort level or baseline comfort level  Outcome: Progressing     Problem: Nutrition Deficit:  Goal: Optimize nutritional status  Outcome: Progressing     Problem: Musculoskeletal - Adult  Goal: Return mobility to safest level of function  Outcome: Progressing  Flowsheets (Taken 4/27/2024 1923)  Return Mobility to Safest Level of Function: Assess patient stability and activity tolerance for standing, transferring and ambulating with or without assistive devices     Problem: Gastrointestinal - Adult  Goal: Minimal or absence of nausea and vomiting  Outcome: Progressing  Flowsheets (Taken 4/27/2024 1923)  Minimal or absence of nausea and vomiting: Administer IV fluids as ordered to ensure adequate hydration     Problem: Metabolic/Fluid and Electrolytes - Adult  Goal: Electrolytes maintained within normal limits  Outcome: Progressing  Flowsheets (Taken 4/27/2024 1923)  Electrolytes maintained within normal limits: Monitor labs and assess patient for signs and symptoms of electrolyte imbalances     Problem: Skin/Tissue Integrity  Goal: Absence of new skin breakdown  Description: 1.  Monitor for areas of redness and/or skin breakdown  2.  Assess vascular access sites hourly  3.  Every 4-6 hours minimum:  
  Problem: Discharge Planning  Goal: Discharge to home or other facility with appropriate resources  Outcome: Progressing  Flowsheets (Taken 4/28/2024 0800)  Discharge to home or other facility with appropriate resources:   Identify barriers to discharge with patient and caregiver   Refer to discharge planning if patient needs post-hospital services based on physician order or complex needs related to functional status, cognitive ability or social support system   Arrange for needed discharge resources and transportation as appropriate     Problem: ABCDS Injury Assessment  Goal: Absence of physical injury  Outcome: Progressing     Problem: Safety - Adult  Goal: Free from fall injury  Outcome: Progressing     Problem: Pain  Goal: Verbalizes/displays adequate comfort level or baseline comfort level  Outcome: Progressing     Problem: Nutrition Deficit:  Goal: Optimize nutritional status  Outcome: Progressing     Problem: Musculoskeletal - Adult  Goal: Return mobility to safest level of function  Outcome: Progressing  Flowsheets (Taken 4/28/2024 0800)  Return Mobility to Safest Level of Function: Assess patient stability and activity tolerance for standing, transferring and ambulating with or without assistive devices     
GI UPDATE    GI consulted for PEG removal.  Patient being discharged today to [place].   Patient is scheduled for EGD with PEG tube removal on 5/10/24 w/ Dr. Jarrod Smalls to d/c per GI.    Electronically signed by MASSIMO Smith NP on 5/2/2024 at 2:29 PM    
GI plan of care:    Asked per Dr. Youngblood to see pt for Peg tube removal    Pt is already scheduled as OP w/Dr. Guo for EGD/Colonoscopy w/PEG removal   [facility]   Encourage pt to maintain the appointment.     GI will sign off.     Kei Orourke, CNP    
1930)  Electrolytes maintained within normal limits: Monitor labs and assess patient for signs and symptoms of electrolyte imbalances     Problem: Skin/Tissue Integrity  Goal: Absence of new skin breakdown  Description: 1.  Monitor for areas of redness and/or skin breakdown  2.  Assess vascular access sites hourly  3.  Every 4-6 hours minimum:  Change oxygen saturation probe site  4.  Every 4-6 hours:  If on nasal continuous positive airway pressure, respiratory therapy assess nares and determine need for appliance change or resting period.  4/30/2024 2793 by Masoud Adkins, RN  Outcome: Progressing  4/30/2024 1751 by Amy Lopez, RN  Outcome: Progressing  Note: No new occurrence of skin breakdown noted during this shift.     
and/or skin breakdown  2.  Assess vascular access sites hourly  3.  Every 4-6 hours minimum:  Change oxygen saturation probe site  4.  Every 4-6 hours:  If on nasal continuous positive airway pressure, respiratory therapy assess nares and determine need for appliance change or resting period.  Outcome: Progressing

## 2024-05-03 ENCOUNTER — HOSPITAL ENCOUNTER (OUTPATIENT)
Age: 64
Setting detail: SPECIMEN
Discharge: HOME | End: 2024-05-03

## 2024-05-03 LAB
ANION GAP SERPL CALCULATED.3IONS-SCNC: 7 MMOL/L (ref 9–16)
BUN SERPL-MCNC: 7 MG/DL (ref 8–23)
CALCIUM SERPL-MCNC: 8.5 MG/DL (ref 8.6–10.4)
CHLORIDE SERPL-SCNC: 106 MMOL/L (ref 98–107)
CO2 SERPL-SCNC: 23 MMOL/L (ref 20–31)
CREAT SERPL-MCNC: 0.7 MG/DL (ref 0.7–1.2)
ERYTHROCYTE [DISTWIDTH] IN BLOOD BY AUTOMATED COUNT: 15.6 % (ref 11.8–14.4)
GFR, ESTIMATED: >90 ML/MIN/1.73M2
GLUCOSE SERPL-MCNC: 91 MG/DL (ref 74–99)
HCT VFR BLD AUTO: 28.9 % (ref 40.7–50.3)
HGB BLD-MCNC: 8.9 G/DL (ref 13–17)
MCH RBC QN AUTO: 30.9 PG (ref 25.2–33.5)
MCHC RBC AUTO-ENTMCNC: 30.8 G/DL (ref 28.4–34.8)
MCV RBC AUTO: 100.3 FL (ref 82.6–102.9)
NRBC BLD-RTO: 0 PER 100 WBC
PLATELET # BLD AUTO: 165 K/UL (ref 138–453)
PMV BLD AUTO: 9.7 FL (ref 8.1–13.5)
POTASSIUM SERPL-SCNC: 3.3 MMOL/L (ref 3.7–5.3)
RBC # BLD AUTO: 2.88 M/UL (ref 4.21–5.77)
SODIUM SERPL-SCNC: 136 MMOL/L (ref 136–145)
WBC OTHER # BLD: 6.3 K/UL (ref 3.5–11.3)

## 2024-05-03 PROCEDURE — 80048 BASIC METABOLIC PNL TOTAL CA: CPT

## 2024-05-03 PROCEDURE — 36415 COLL VENOUS BLD VENIPUNCTURE: CPT

## 2024-05-03 PROCEDURE — 85027 COMPLETE CBC AUTOMATED: CPT

## 2024-05-03 PROCEDURE — P9603 ONE-WAY ALLOW PRORATED MILES: HCPCS

## 2024-05-03 RX ORDER — LANSOPRAZOLE 30 MG/1
TABLET, ORALLY DISINTEGRATING, DELAYED RELEASE ORAL
Qty: 30 TABLET | Refills: 0 | OUTPATIENT
Start: 2024-05-03

## 2024-05-08 NOTE — PRE-PROCEDURE INSTRUCTIONS
No answer, left message ?                             Unable to leave message ?    When were you told to arrive at hospital ?      Do you have a  ?    Are you on any blood thinners ?                     If yes when did you stop taking ?    Do you have your prep Rx filled and instruction ?      Nothing to eat the day before , only clear liquids.    Are you experiencing any covid symptoms ?     Do you have any infections or rash we should be aware of ?      Do you have the Hibiclens soap to use the night before and the morning of surgery ?    Nothing to eat or drink after midnight, only a sip of water to take any medication instructed to take the night before.  Wear comfortable clothing, leave any valuables at home, remove any jewelry and body piercing . UNABLE TO CALL AS THERE IS NO PHONE NUMBER LISTED,ATTEMPTED TO CALL THE ALTERNATE CONTACT (HIPAA SIGNED) WITH NO ANSWER-DID NOT LEAVE A VM

## 2024-05-09 ENCOUNTER — ANESTHESIA EVENT (OUTPATIENT)
Dept: ENDOSCOPY | Age: 64
End: 2024-05-09
Payer: COMMERCIAL

## 2024-05-10 ENCOUNTER — ANESTHESIA (OUTPATIENT)
Dept: ENDOSCOPY | Age: 64
End: 2024-05-10
Payer: COMMERCIAL

## 2024-05-10 ENCOUNTER — HOSPITAL ENCOUNTER (OUTPATIENT)
Age: 64
Setting detail: OUTPATIENT SURGERY
Discharge: NURSING, REHAB OR LONG-TERM CARE | End: 2024-05-10
Attending: INTERNAL MEDICINE | Admitting: INTERNAL MEDICINE
Payer: COMMERCIAL

## 2024-05-10 ENCOUNTER — HOSPITAL ENCOUNTER (OUTPATIENT)
Age: 64
Setting detail: SPECIMEN
Discharge: HOME | End: 2024-05-10

## 2024-05-10 VITALS
HEART RATE: 64 BPM | WEIGHT: 170 LBS | TEMPERATURE: 98.8 F | HEIGHT: 70 IN | RESPIRATION RATE: 18 BRPM | SYSTOLIC BLOOD PRESSURE: 121 MMHG | BODY MASS INDEX: 24.34 KG/M2 | OXYGEN SATURATION: 98 % | DIASTOLIC BLOOD PRESSURE: 59 MMHG

## 2024-05-10 DIAGNOSIS — C80.1 MALIGNANT NEOPLASM METASTATIC TO LOWER THIRD OF ESOPHAGUS WITH UNKNOWN PRIMARY SITE (HCC): ICD-10-CM

## 2024-05-10 DIAGNOSIS — K70.30 ALCOHOLIC CIRRHOSIS, UNSPECIFIED WHETHER ASCITES PRESENT (HCC): ICD-10-CM

## 2024-05-10 DIAGNOSIS — C78.89 MALIGNANT NEOPLASM METASTATIC TO LOWER THIRD OF ESOPHAGUS WITH UNKNOWN PRIMARY SITE (HCC): ICD-10-CM

## 2024-05-10 LAB
ALBUMIN SERPL-MCNC: 3 G/DL (ref 3.5–5.2)
ALBUMIN/GLOB SERPL: 1 {RATIO} (ref 1–2.5)
ALP SERPL-CCNC: 128 U/L (ref 40–129)
ALT SERPL-CCNC: 8 U/L (ref 10–50)
ANION GAP SERPL CALCULATED.3IONS-SCNC: 10 MMOL/L (ref 9–16)
AST SERPL-CCNC: 42 U/L (ref 10–50)
BASOPHILS # BLD: 0.09 K/UL (ref 0–0.2)
BASOPHILS NFR BLD: 1 % (ref 0–2)
BILIRUB SERPL-MCNC: 1.6 MG/DL (ref 0–1.2)
BUN SERPL-MCNC: 10 MG/DL (ref 8–23)
CALCIUM SERPL-MCNC: 8.9 MG/DL (ref 8.6–10.4)
CHLORIDE SERPL-SCNC: 108 MMOL/L (ref 98–107)
CO2 SERPL-SCNC: 18 MMOL/L (ref 20–31)
CREAT SERPL-MCNC: 1 MG/DL (ref 0.7–1.2)
EOSINOPHIL # BLD: 0.29 K/UL (ref 0–0.44)
EOSINOPHILS RELATIVE PERCENT: 4 % (ref 1–4)
ERYTHROCYTE [DISTWIDTH] IN BLOOD BY AUTOMATED COUNT: 13.9 % (ref 11.8–14.4)
GFR, ESTIMATED: 80 ML/MIN/1.73M2
GLUCOSE SERPL-MCNC: 81 MG/DL (ref 74–99)
HCT VFR BLD AUTO: 33.1 % (ref 40.7–50.3)
HGB BLD-MCNC: 10.2 G/DL (ref 13–17)
IMM GRANULOCYTES # BLD AUTO: 0.03 K/UL (ref 0–0.3)
IMM GRANULOCYTES NFR BLD: 0 %
LYMPHOCYTES NFR BLD: 1.15 K/UL (ref 1.1–3.7)
LYMPHOCYTES RELATIVE PERCENT: 16 % (ref 24–43)
MAGNESIUM SERPL-MCNC: 1.4 MG/DL (ref 1.6–2.4)
MCH RBC QN AUTO: 30.7 PG (ref 25.2–33.5)
MCHC RBC AUTO-ENTMCNC: 30.8 G/DL (ref 28.4–34.8)
MCV RBC AUTO: 99.7 FL (ref 82.6–102.9)
MONOCYTES NFR BLD: 1.3 K/UL (ref 0.1–1.2)
MONOCYTES NFR BLD: 18 % (ref 3–12)
NEUTROPHILS NFR BLD: 61 % (ref 36–65)
NEUTS SEG NFR BLD: 4.39 K/UL (ref 1.5–8.1)
NRBC BLD-RTO: 0 PER 100 WBC
PLATELET # BLD AUTO: 186 K/UL (ref 138–453)
PMV BLD AUTO: 10.6 FL (ref 8.1–13.5)
POTASSIUM SERPL-SCNC: 5.2 MMOL/L (ref 3.7–5.3)
PROT SERPL-MCNC: 6.2 G/DL (ref 6.6–8.7)
RBC # BLD AUTO: 3.32 M/UL (ref 4.21–5.77)
SODIUM SERPL-SCNC: 136 MMOL/L (ref 136–145)
WBC OTHER # BLD: 7.3 K/UL (ref 3.5–11.3)

## 2024-05-10 PROCEDURE — 36415 COLL VENOUS BLD VENIPUNCTURE: CPT

## 2024-05-10 PROCEDURE — 2709999900 HC NON-CHARGEABLE SUPPLY: Performed by: INTERNAL MEDICINE

## 2024-05-10 PROCEDURE — 43239 EGD BIOPSY SINGLE/MULTIPLE: CPT | Performed by: INTERNAL MEDICINE

## 2024-05-10 PROCEDURE — 83735 ASSAY OF MAGNESIUM: CPT

## 2024-05-10 PROCEDURE — 2500000003 HC RX 250 WO HCPCS: Performed by: ANESTHESIOLOGY

## 2024-05-10 PROCEDURE — P9603 ONE-WAY ALLOW PRORATED MILES: HCPCS

## 2024-05-10 PROCEDURE — 3609012400 HC EGD TRANSORAL BIOPSY SINGLE/MULTIPLE: Performed by: INTERNAL MEDICINE

## 2024-05-10 PROCEDURE — 88305 TISSUE EXAM BY PATHOLOGIST: CPT

## 2024-05-10 PROCEDURE — 2580000003 HC RX 258: Performed by: ANESTHESIOLOGY

## 2024-05-10 PROCEDURE — 80053 COMPREHEN METABOLIC PANEL: CPT

## 2024-05-10 PROCEDURE — 2500000003 HC RX 250 WO HCPCS: Performed by: NURSE ANESTHETIST, CERTIFIED REGISTERED

## 2024-05-10 PROCEDURE — 3700000001 HC ADD 15 MINUTES (ANESTHESIA): Performed by: INTERNAL MEDICINE

## 2024-05-10 PROCEDURE — 85025 COMPLETE CBC W/AUTO DIFF WBC: CPT

## 2024-05-10 PROCEDURE — 6360000002 HC RX W HCPCS: Performed by: NURSE ANESTHETIST, CERTIFIED REGISTERED

## 2024-05-10 PROCEDURE — 3700000000 HC ANESTHESIA ATTENDED CARE: Performed by: INTERNAL MEDICINE

## 2024-05-10 PROCEDURE — 7100000011 HC PHASE II RECOVERY - ADDTL 15 MIN: Performed by: INTERNAL MEDICINE

## 2024-05-10 PROCEDURE — 7100000010 HC PHASE II RECOVERY - FIRST 15 MIN: Performed by: INTERNAL MEDICINE

## 2024-05-10 RX ORDER — POTASSIUM CHLORIDE 1500 MG/1
20 TABLET, EXTENDED RELEASE ORAL DAILY PRN
COMMUNITY

## 2024-05-10 RX ORDER — SODIUM CHLORIDE 9 MG/ML
INJECTION, SOLUTION INTRAVENOUS CONTINUOUS
Status: DISCONTINUED | OUTPATIENT
Start: 2024-05-10 | End: 2024-05-10 | Stop reason: HOSPADM

## 2024-05-10 RX ORDER — SODIUM CHLORIDE 9 MG/ML
INJECTION, SOLUTION INTRAVENOUS PRN
Status: DISCONTINUED | OUTPATIENT
Start: 2024-05-10 | End: 2024-05-10 | Stop reason: HOSPADM

## 2024-05-10 RX ORDER — LIDOCAINE HYDROCHLORIDE 10 MG/ML
1 INJECTION, SOLUTION EPIDURAL; INFILTRATION; INTRACAUDAL; PERINEURAL
Status: COMPLETED | OUTPATIENT
Start: 2024-05-10 | End: 2024-05-10

## 2024-05-10 RX ORDER — PANTOPRAZOLE SODIUM 40 MG/1
40 TABLET, DELAYED RELEASE ORAL DAILY
Qty: 60 TABLET | Refills: 1 | Status: SHIPPED | OUTPATIENT
Start: 2024-05-10

## 2024-05-10 RX ORDER — SODIUM CHLORIDE 0.9 % (FLUSH) 0.9 %
5-40 SYRINGE (ML) INJECTION EVERY 12 HOURS SCHEDULED
Status: DISCONTINUED | OUTPATIENT
Start: 2024-05-10 | End: 2024-05-10 | Stop reason: HOSPADM

## 2024-05-10 RX ORDER — PROPOFOL 10 MG/ML
INJECTION, EMULSION INTRAVENOUS PRN
Status: DISCONTINUED | OUTPATIENT
Start: 2024-05-10 | End: 2024-05-10 | Stop reason: SDUPTHER

## 2024-05-10 RX ORDER — LIDOCAINE HYDROCHLORIDE 20 MG/ML
INJECTION, SOLUTION INFILTRATION; PERINEURAL PRN
Status: DISCONTINUED | OUTPATIENT
Start: 2024-05-10 | End: 2024-05-10 | Stop reason: SDUPTHER

## 2024-05-10 RX ORDER — SODIUM CHLORIDE 0.9 % (FLUSH) 0.9 %
5-40 SYRINGE (ML) INJECTION PRN
Status: DISCONTINUED | OUTPATIENT
Start: 2024-05-10 | End: 2024-05-10 | Stop reason: HOSPADM

## 2024-05-10 RX ADMIN — LIDOCAINE HYDROCHLORIDE 100 MG: 20 INJECTION, SOLUTION INFILTRATION; PERINEURAL at 13:27

## 2024-05-10 RX ADMIN — PROPOFOL 120 MG: 10 INJECTION, EMULSION INTRAVENOUS at 13:27

## 2024-05-10 RX ADMIN — PROPOFOL 30 MG: 10 INJECTION, EMULSION INTRAVENOUS at 13:30

## 2024-05-10 RX ADMIN — LIDOCAINE HYDROCHLORIDE 1 ML: 10 INJECTION, SOLUTION EPIDURAL; INFILTRATION; INTRACAUDAL; PERINEURAL at 12:17

## 2024-05-10 RX ADMIN — SODIUM CHLORIDE: 9 INJECTION, SOLUTION INTRAVENOUS at 12:18

## 2024-05-10 ASSESSMENT — ENCOUNTER SYMPTOMS
APNEA: 0
COUGH: 0
BACK PAIN: 1
SHORTNESS OF BREATH: 1
SHORTNESS OF BREATH: 1
TROUBLE SWALLOWING: 0
SORE THROAT: 1

## 2024-05-10 ASSESSMENT — PAIN - FUNCTIONAL ASSESSMENT
PAIN_FUNCTIONAL_ASSESSMENT: 0-10
PAIN_FUNCTIONAL_ASSESSMENT: NONE - DENIES PAIN

## 2024-05-10 NOTE — ANESTHESIA POSTPROCEDURE EVALUATION
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    Yen Phase I:      Yen Phase II: Yen Score: 8    Anesthesia Post Evaluation    Patient location during evaluation: PACU  Patient participation: complete - patient participated  Level of consciousness: awake and alert  Airway patency: patent  Nausea & Vomiting: no vomiting  Cardiovascular status: hemodynamically stable  Respiratory status: acceptable  Hydration status: euvolemic  Comments: POST- ANESTHESIA EVALUATION       Pt Name: Frank Lisa  MRN: [id number]  YOB: 1960  Date of evaluation: 5/10/2024  Time:  3:19 PM      /68   Pulse 83   Temp 99 °F (37.2 °C) (Infrared)   Resp 18   Ht 1.778 m (5' 10\")   Wt 77.1 kg (170 lb)   SpO2 100%   BMI 24.39 kg/m²      Consciousness Level  Awake  Cardiopulmonary Status  Stable  Pain Adequately Treated YES  Nausea / Vomiting  NO  Adequate Hydration  YES  Anesthesia Related Complications NONE      Electronically signed by Kyle Sanders MD on 5/10/2024 at 3:19 PM         Pain management: satisfactory to patient    No notable events documented.

## 2024-05-10 NOTE — ANESTHESIA PRE PROCEDURE
07/20/2016   • Hypotension 12/20/2021   • Lumbar radiculitis 11/08/2016   • Mastoid disorder, bilateral 12/31/2022    biLateral mastoid effusion   • Pericardial effusion    • PONV (postoperative nausea and vomiting)    • Poor venous access     has port in place.   • Port-A-Cath in place     right upper chest   • Portal hypertension (HCC)    • SBO (small bowel obstruction) (HCC) 04/01/2024   • Sciatica    • Secondary esophageal varices (HCC) 06/07/2022   • Shortness of breath    • Sleep difficulties    • Spinal stenosis    • Stomach ulcer     hx of   • Thrombocytopenia (HCC) 12/23/2020   • Tubular adenoma of colon 2016, 2018   • Under care of team     PCP- Lopez LEONE   • Under care of team     GI- Dr Cordova   • Vitamin D deficiency    • Wears glasses        Past Surgical History:        Procedure Laterality Date   • BUNIONECTOMY      twice on right side   • BUNIONECTOMY Left    • CARPAL TUNNEL RELEASE Right    • COLON SURGERY  10/10/2023    EXPLORATORY LAPAROTOMY, REVERSAL ILEOSTOMY WITH ILEOCOLOSTOMY, LYSIS OF ADHESIONS,   • COLONOSCOPY      at age 39   • COLONOSCOPY  10/05/2016    polyps-pathology tubular adenoma, and abnormal looking mucosa right colon-pathology-tubular adenoma   • COLONOSCOPY N/A 03/30/2018    COLONOSCOPY POLYPECTOMY COLD BIOPSY performed by Augusto Cordova MD at [facility] OR   • COLONOSCOPY  03/30/2018    Small polyp in the sigmoid colon and excised with biopsy forceps--tubular adenoma   • COLONOSCOPY N/A 04/16/2022    COLONOSCOPY POLYPECTOMY performed by Augusto Cordova MD at [facility] OR   • ENDOSCOPY, COLON, DIAGNOSTIC      EGD   • ESOPHAGOGASTRODUODENOSCOPY  12/29/2022   • ESOPHAGOGASTRODUODENOSCOPY N/A 01/03/2023   • IR PORT PLACEMENT EQUAL OR GREATER THAN 5 YEARS  04/19/2021    IR PORT PLACEMENT EQUAL OR GREATER THAN 5 YEARS 4/19/2021 [facility] SPECIAL PROCEDURES   • IR TIPS INSERTION  12/01/2022    IR TIPS INSERTION 12/1/2022 Scar VELEZ MD [facility] SPECIAL PROCEDURES   • KNEE

## 2024-05-10 NOTE — DISCHARGE INSTRUCTIONS
Monitor for any abdominal pain, nausea/ vomiting or leakage from G-tube site     EGD DISCHARGE INSTRUCTIONS    Activity:  Rest today. No driving, operating machinery, or making any important decisions today. May resume normal activity tomorrow.    Diet:  Following EGD eat slowly, chew food well, cut food into small pieces-Avoid greasy, spicy, \"crunchy\" foods X 48 hours.     Call your Doctor if you have any of the following:  -Passing blood rectally or vomiting blood (it may be red or black).  -Persistent nausea/vomiting  -Severe abdominal or chest pain not relieved with passing gas  -Fever of 100 degrees or more  -Redness or swelling at the IV site  -Severe sore throat or neck pain    Sedation or General Anesthesia, Adult  Care After  Refer to this sheet in the next 24 hours. These instructions provide you with information on caring for yourself after your procedure. Your caregiver may also give you more specific instructions. Your treatment has been planned according to current medical practices, but problems sometimes occur. Call your caregiver if you have any problems or questions after your procedure.   HOME CARE INSTRUCTIONS   Do not participate in any activities that require you to be alert or coordinated. Do not:  Drive.  Swim.  Ride a bicycle.  Operate heavy machinery.  Cook.  Use power tools.  Climb ladders.  Work at heights.  Take a bath.  Do not drink alcohol.  Do not make any important decisions or sign legal documents.  Stay with an adult.  The first meal following your procedure should be light and small. Avoid solid foods if you feel sick to your stomach (nauseous) or if you throw up (vomit).  Drink enough fluids to keep your urine clear or pale yellow.  Only take your usual medicines or new medicines if your caregiver approves them.  Only take over-the-counter or prescription medicines for pain, discomfort, or fever as directed by your caregiver.  Keep all follow-up appointments as directed by your

## 2024-05-10 NOTE — OP NOTE
PROCEDURE NOTE    DATE OF PROCEDURE: 5/10/2024     SURGEON: Kole Guo MD  Facility: [facility]  ASSISTANT: None  Anesthesia: Monitored anesthesia care  PREOPERATIVE DIAGNOSIS: Cirrhosis S/P PEG tube for removal of G-tube    Diagnosis:    POSTOPERATIVE DIAGNOSIS: As described below    OPERATION: Upper GI endoscopy with Biopsy    ANESTHESIA: Moderate Sedation     ESTIMATED BLOOD LOSS: Less than 50 ml    COMPLICATIONS: None.     SPECIMENS:  Was Obtained: gastric mucosa    HISTORY: The patient is a 63 y.o. year old male with history of above preop diagnosis.  I recommended esophagogastroduodenoscopy with possible biopsy and I explained the risk, benefits, expected outcome, and alternatives to the procedure.  Risks included but are not limited to bleeding, infection, respiratory distress, hypotension, and perforation of the esophagus, stomach, or duodenum.  Patient understands and is in agreement.      PROCEDURE: The patient was given IV conscious sedation.  The patient's SPO2 remained above 90% throughout the procedure.The gastroscope was inserted orally and advanced under direct vision through the esophagus, through the stomach, through the pylorus, and into the descending duodenum.      Post sedation note :The patient's SPO2 remained above 90% throughout the procedure.the vital signs remained stable , and no immediate complication form the procedure noted, patient will be ready for d/c when criteria is met .      Findings:    Retropharyngeal area was grossly normal appearing    Esophagus: abnormal: LA-grade-A esophagitis from 34 cm to 40 cm    Esophagogastric markings: Diaphragmatic hiatus- 40 cm; GE junction- 40 cm; Squamo-columnar junction- 40 cm    Stomach:    Fundus: normal    Body: abnormal: mild patchy erythema; G-tube in place    Antrum: normal  Biopsy obtained     Duodenum:     Descending: normal    Bulb: normal    G-tube removed by yanking it out from outside; sterile dressing applied

## 2024-05-10 NOTE — H&P
HISTORY and PHYSICAL  [facility]       NAME:  Frank Lisa  MRN: [id number]   YOB: 1960   Date: 5/10/2024   Age: 63 y.o.  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    Hospital discharge note 4/23/2024 Dr Russo  [facility] Course:  Frank Lisa is a 63 y.o. male who was admitted for the management of Hypokalemia , presented to ER with    patient is 63-year-old male with multiple comorbidities include history of liver cirrhosis secondary to alcohol use and hepatitis C, history of TIPS, hepatitis C treated esophageal cancer, history of dysphagia s/p PEG follows up with oncology currently in remission , recently had EGD on 1/9/20/2024 did not show any evidence of recurrence,, presented to the ER with generalized weakness and fatigue and difficulty walking.  Patient states that he feels that he has some electrolyte abnormalities, patient does get hypomagnesemia and hypokalemia  Patient, found to have new onset A-fib, evaluated by cardiologist, he Fili Vascor was 1, not considered a candidate for anticoagulation, also has risk of bleeding with cirrhosis of liver  Patient, found to have UTI, treated with antibiotic  Neurology was also consulted, patient underwent extensive workup including MRI brain, which was negative  Was evaluated by orthopedic surgery, underwent MRI brain, MRI cervical spine, MRI thoracic spine  During workup he was found to be positive for MARYSOL, will have outpatient rheumatology workup  His dose of Cozaar was reduced since patient had symptomatic hypotension he was positive for orthostatic hypotension  Patient, symptoms improved  Patient getting discharged to SNF  Will  Bowel sounds are normal. There is distension.      Tenderness: There is no abdominal tenderness. There is no guarding.      Comments: PEG present midline abdomen   Musculoskeletal:         General: No swelling.      Cervical back: Normal range of motion.      Right lower leg: No edema.      Left lower leg: No edema.   Skin:     General: Skin is dry.   Neurological:      Mental Status: He is alert and oriented to person, place, and time.   Psychiatric:         Mood and Affect: Mood normal.         Behavior: Behavior normal.                   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) [C78.89, C80.1]    REMOVAL PERCUTANEOUS ENDOSCOPIC GASTROSTOMY TUBE     Patient Active Problem List    Diagnosis Date Noted    Alcohol withdrawal syndrome, with delirium (HCC) 03/21/2023    Lightheadedness 03/17/2023    Anasarca 01/09/2023    Chronic hepatitis C without hepatic coma (HCC) 01/08/2023    Swelling of joint of upper arm, right 01/08/2023    Acute deep vein thrombosis (DVT) of brachial vein of right upper extremity (HCC) 01/07/2023    Acute encephalopathy 01/01/2023    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    Acute metabolic encephalopathy 12/29/2022    S/P TIPS (transjugular intrahepatic portosystemic shunt) 12/01/2022    Goals of care, counseling/discussion 10/31/2022    ACP (advance care planning) 10/31/2022    Palliative care encounter 10/31/2022    GI bleed 09/13/2022    Esophageal polyp 06/07/2022    Drop in hemoglobin 06/03/2022    Shortness of breath     Ascites due to alcoholic cirrhosis (HCC) 04/26/2022    Acute kidney failure, unspecified (HCC) 04/21/2022    Muscle weakness (generalized) 07/28/2016    Other abnormalities of gait and mobility 07/28/2016    Wrist arthritis 04/25/2024    Pain in joint involving right ankle and foot 04/25/2024    Small

## 2024-05-13 RX ORDER — LOSARTAN POTASSIUM 50 MG/1
50 TABLET ORAL DAILY
Qty: 90 TABLET | Refills: 3 | OUTPATIENT
Start: 2024-05-13

## 2024-05-13 RX ORDER — METOPROLOL TARTRATE 50 MG/1
50 TABLET, FILM COATED ORAL 2 TIMES DAILY
Qty: 180 TABLET | Refills: 3 | OUTPATIENT
Start: 2024-05-13

## 2024-05-14 ENCOUNTER — TELEPHONE (OUTPATIENT)
Dept: PRIMARY CARE CLINIC | Age: 64
End: 2024-05-14

## 2024-05-14 ENCOUNTER — HOSPITAL ENCOUNTER (OUTPATIENT)
Age: 64
Discharge: HOME | End: 2024-05-14
Payer: COMMERCIAL

## 2024-05-14 ENCOUNTER — OFFICE VISIT (OUTPATIENT)
Dept: PRIMARY CARE CLINIC | Age: 64
End: 2024-05-14
Payer: COMMERCIAL

## 2024-05-14 ENCOUNTER — TELEPHONE (OUTPATIENT)
Dept: NEUROSURGERY | Age: 64
End: 2024-05-14

## 2024-05-14 VITALS
HEART RATE: 85 BPM | DIASTOLIC BLOOD PRESSURE: 64 MMHG | SYSTOLIC BLOOD PRESSURE: 130 MMHG | WEIGHT: 171 LBS | HEIGHT: 70 IN | OXYGEN SATURATION: 99 % | BODY MASS INDEX: 24.48 KG/M2

## 2024-05-14 DIAGNOSIS — C15.9 ESOPHAGEAL ADENOCARCINOMA (HCC): ICD-10-CM

## 2024-05-14 DIAGNOSIS — I10 ESSENTIAL HYPERTENSION: ICD-10-CM

## 2024-05-14 DIAGNOSIS — F19.10 SUBSTANCE ABUSE (HCC): Primary | ICD-10-CM

## 2024-05-14 DIAGNOSIS — F10.931 ALCOHOL WITHDRAWAL SYNDROME, WITH DELIRIUM (HCC): ICD-10-CM

## 2024-05-14 DIAGNOSIS — K70.30 ALCOHOLIC CIRRHOSIS, UNSPECIFIED WHETHER ASCITES PRESENT (HCC): ICD-10-CM

## 2024-05-14 DIAGNOSIS — M51.36 DDD (DEGENERATIVE DISC DISEASE), LUMBAR: ICD-10-CM

## 2024-05-14 LAB
ERYTHROCYTE [DISTWIDTH] IN BLOOD BY AUTOMATED COUNT: 14.4 % (ref 11.5–14.9)
HCT VFR BLD AUTO: 34.1 % (ref 41–53)
HGB BLD-MCNC: 11.1 G/DL (ref 13.5–17.5)
MCH RBC QN AUTO: 31.1 PG (ref 26–34)
MCHC RBC AUTO-ENTMCNC: 32.5 G/DL (ref 31–37)
MCV RBC AUTO: 95.7 FL (ref 80–100)
PLATELET # BLD AUTO: 195 K/UL (ref 150–450)
PMV BLD AUTO: 8.2 FL (ref 6–12)
RBC # BLD AUTO: 3.56 M/UL (ref 4.5–5.9)
WBC OTHER # BLD: 4.2 K/UL (ref 3.5–11)

## 2024-05-14 PROCEDURE — 85027 COMPLETE CBC AUTOMATED: CPT

## 2024-05-14 PROCEDURE — 99214 OFFICE O/P EST MOD 30 MIN: CPT | Performed by: PHYSICIAN ASSISTANT

## 2024-05-14 PROCEDURE — 36415 COLL VENOUS BLD VENIPUNCTURE: CPT

## 2024-05-14 PROCEDURE — 3078F DIAST BP <80 MM HG: CPT | Performed by: PHYSICIAN ASSISTANT

## 2024-05-14 PROCEDURE — 3075F SYST BP GE 130 - 139MM HG: CPT | Performed by: PHYSICIAN ASSISTANT

## 2024-05-14 RX ORDER — LACTULOSE 10 G/15ML
30 SOLUTION ORAL 3 TIMES DAILY
Qty: 2700 ML | Refills: 0
Start: 2024-05-14

## 2024-05-14 ASSESSMENT — ENCOUNTER SYMPTOMS
CHEST TIGHTNESS: 0
DIARRHEA: 0
SHORTNESS OF BREATH: 0
COUGH: 0
CONSTIPATION: 0
ABDOMINAL DISTENTION: 0
ABDOMINAL PAIN: 0
SORE THROAT: 0

## 2024-05-14 NOTE — PROGRESS NOTES
ESOPHAGOGASTRODUODENOSCOPY performed by Lucian Harley MD at [facility] OR    UPPER GASTROINTESTINAL ENDOSCOPY N/A 06/06/2022    EGD BAND LIGATION performed by Bi Dawkins MD at [facility] ENDO    UPPER GASTROINTESTINAL ENDOSCOPY N/A 06/09/2022    EGD ESOPHAGOGASTRODUODENOSCOPY performed by Bi Dawkins MD at [facility]    UPPER GASTROINTESTINAL ENDOSCOPY N/A 09/14/2022    EGD ESOPHAGOGASTRODUODENOSCOPY ENDOSCOPIC APC AT GE JUNCTION performed by Jose Mckenzie MD at [facility]    UPPER GASTROINTESTINAL ENDOSCOPY N/A 10/19/2022    EGD BIOPSY performed by Augusto Cordova MD at [facility]    UPPER GASTROINTESTINAL ENDOSCOPY N/A 10/31/2022    EGD with APC performed by Augusto Cordova MD at [facility]    UPPER GASTROINTESTINAL ENDOSCOPY  12/29/2022    EGD ESOPHAGOGASTRODUODENOSCOPY performed by Yo Santos MD at [facility] OR    UPPER GASTROINTESTINAL ENDOSCOPY N/A 01/03/2023    EGD ESOPHAGOGASTRODUODENOSCOPY performed by Arsen Russo MD at [facility] OR    UPPER GASTROINTESTINAL ENDOSCOPY N/A 01/26/2023    EGD CONTROL HEMORRHAGE performed by Augusto Cordova MD at [facility]    UPPER GASTROINTESTINAL ENDOSCOPY N/A 02/13/2023    EGD WITH APC GOLD PROBE performed by Augusto Cordova MD at [facility]    UPPER GASTROINTESTINAL ENDOSCOPY N/A 03/17/2023    EGD WITH RESOLUTION CLIP APPLICATION X3 performed by Lucian Harley MD at [facility]    UPPER GASTROINTESTINAL ENDOSCOPY N/A 08/02/2023    EGD WITH APC TO SMALL BOWEL AVM AND RESOLUTION CLIP APPLIED TO GE JUNCTION performed by Dixon Olivia MD at [facility]    UPPER GASTROINTESTINAL ENDOSCOPY N/A 1/9/2024    EGD BIOPSY ESOPHAGOGASTRODUODENOSCOPY PEG TUBE INSERTION performed by Kole Guo MD at [facility]    UPPER GASTROINTESTINAL ENDOSCOPY N/A 5/10/2024    EGD BIOPSY WITH REMOVAL PERCUTANEOUS ENDOSCOPIC GASTROSTOMY TUBE performed by Kole Guo MD at [facility]    WISDOM TOOTH EXTRACTION         Family History   Problem Relation Age of Onset    Cancer Mother

## 2024-05-14 NOTE — TELEPHONE ENCOUNTER
Care Transitions Initial Follow Up Call    Outreach made within 2 business days of discharge: Yes    Patient: Frank Lisa Patient : 1960   MRN: [id number]  Reason for Admission: There are no discharge diagnoses documented for the most recent discharge.  Discharge Date: 5/10/24       Spoke with: left message to call office.    Discharge department/facility:     [facility] Interactive Patient Contact:  Was patient able to fill all prescriptions:   Was patient instructed to bring all medications to the follow-up visit:   Is patient taking all medications as directed in the discharge summary?   Does patient understand their discharge instructions:   Does patient have questions or concerns that need addressed prior to 7-14 day follow up office visit:     Scheduled appointment with PCP within 7-14 days    Follow Up  Future Appointments   Date Time Provider Department Center   2024  4:30 PM [facility]   2024  4:00 PM Radhika Fay MD Neuro Spec Neurology -   2024 11:30 AM Ashleigh Sotelo DO [facility] NE[facility]O [facility]   2024  2:30 PM CZ MEDICATION MGMT [facility] [place]

## 2024-05-14 NOTE — TELEPHONE ENCOUNTER
Pt's ex-wife, Nilam, called and stated that she wondering if pt still needs to see Dr. Fay. States they were informed by Dr. Sotelo that they need to see Dr. Fay but ended up seeing  on 4/19/24. Per Dr. Sotelo, does Dr. Loaiza know the cause of his neuropathy as notes do not mention the cause. Please advise so pt will know if he needs to keep or cancel appt with Dr. Fay  5/16.

## 2024-05-15 ENCOUNTER — CLINICAL DOCUMENTATION (OUTPATIENT)
Dept: SPIRITUAL SERVICES | Age: 64
End: 2024-05-15

## 2024-05-15 ENCOUNTER — CASE MANAGEMENT (OUTPATIENT)
Dept: SPIRITUAL SERVICES | Age: 64
End: 2024-05-15

## 2024-05-15 LAB — SURGICAL PATHOLOGY REPORT: NORMAL

## 2024-05-15 NOTE — ACP (ADVANCE CARE PLANNING)
Advance Care Planning   Ambulatory ACP Specialist Patient Outreach    Date:  5/15/2024    ACP Specialist:  UDAY Gunn    Outreach call to patient in follow-up to ACP Specialist referral from:Lopez Rosario PA    [x] PCP  [] Provider   [] Ambulatory Care Management [] Other     For:                  [x] Advance Directive Assistance              [x] Complete Portable DNR order              [] Complete POST/POLST/MOST              [x] Code Status Discussion             [x] Discuss Goals of Care             [] Early ACP Decision-Making              [x] Other (Specify): high EOL score    Date Referral Received:05/14/2024    Next Step:   [] ACP scheduled conversation  [] Outreach again in one week               [] Email / Mail ACP Info Sheets  [] Email / Mail Advance Directive   [] Closing referral.  Routing closure to referring provider/staff and to ACP Specialist .    [] Closure letter mailed to patient with invitation to contact ACP Specialist if / when ready.   [x] Other (Specify here):  discuss with ACP  re: possible in person visit during next PCP follow up visit        [x] At this time, Healthcare Decision Maker Is:        [x] Primary agent named in scanned advance directive.    [] Legal Next of Kin.     [] Unable to determine legal decision maker at this time.         Outreaches:       [x] 1st -  Date:  05/15/2024               Intervention:  [] Spoke with Patient   [x] Left Voice mail [] Email / Mail    [] Lex Machinat  [] Other (Specify) :     Outcomes:Another ACP Specialist Referral received today. This SW has been following this pt in an effort to assist with durable DNR form. Placed call to home number and there was no answer. Left general VM with SW contact. Will review with ACP  to see if in-person visit can be coordinated during 5/31 PCP visit. Noted pt was discharged from hospital to SNF.            [] 2nd -  Date:                 Intervention:  [] Spoke

## 2024-05-15 NOTE — ACP (ADVANCE CARE PLANNING)
Advance Care Planning   Ambulatory ACP Specialist Patient Outreach    Date:  5/15/2024    ACP Specialist:  LYNDSEY Mcfarland    Outreach call to patient in follow-up to ACP Specialist referral from:Lopez Rosario PA    [x] PCP  [] Provider   [] Ambulatory Care Management [] Other     For:                  [x] Advance Directive Assistance              [x] Complete Portable DNR order              [] Complete POST/POLST/MOST              [x] Code Status Discussion             [x] Discuss Goals of Care             [] Early ACP Decision-Making              [x] Other (Specify) high EOL score     Date Referral Received:5/14/24    Next Step:   [x] ACP scheduled conversation  [] Outreach again in one week               [] Email / Mail ACP Info Sheets  [] Email / Mail Advance Directive   [] Closing referral.  Routing closure to referring provider/staff and to ACP Specialist .    [] Closure letter mailed to patient with invitation to contact ACP Specialist if / when ready.   [] Other (Specify here):      Outreaches:         [x]  Additional Outreach -  Date:  5/15/24   (Specify Dates & special circumstances):    Outcomes: ACP Manager called patient as directed in patient demographics \"nilam - ex wife - call with all appts [phone]\" and spoke to patient's ex-wife Nilam Lisa who also manages all of the patient's medical appointment and is patient's HCPOA.     An ACP Conversation was scheduled for patient on 5/23/24 at 12:30 PM [facility] after patient has a provider appointment.          Thank you for this referral.

## 2024-05-16 ENCOUNTER — OFFICE VISIT (OUTPATIENT)
Dept: NEUROLOGY | Age: 64
End: 2024-05-16
Payer: COMMERCIAL

## 2024-05-16 VITALS
SYSTOLIC BLOOD PRESSURE: 143 MMHG | DIASTOLIC BLOOD PRESSURE: 77 MMHG | WEIGHT: 176.6 LBS | BODY MASS INDEX: 25.28 KG/M2 | HEART RATE: 117 BPM | HEIGHT: 70 IN

## 2024-05-16 DIAGNOSIS — R20.0 NUMBNESS: Primary | ICD-10-CM

## 2024-05-16 PROCEDURE — 99214 OFFICE O/P EST MOD 30 MIN: CPT | Performed by: PSYCHIATRY & NEUROLOGY

## 2024-05-16 PROCEDURE — 3078F DIAST BP <80 MM HG: CPT | Performed by: PSYCHIATRY & NEUROLOGY

## 2024-05-16 PROCEDURE — 3077F SYST BP >= 140 MM HG: CPT | Performed by: PSYCHIATRY & NEUROLOGY

## 2024-05-16 RX ORDER — FERROUS SULFATE 325(65) MG
325 TABLET ORAL 2 TIMES DAILY
COMMUNITY

## 2024-05-16 RX ORDER — NADOLOL 20 MG/1
20 TABLET ORAL DAILY
Status: ON HOLD | COMMUNITY
End: 2024-05-25 | Stop reason: HOSPADM

## 2024-05-16 RX ORDER — SUCRALFATE 1 G/1
1 TABLET ORAL 4 TIMES DAILY
Status: ON HOLD | COMMUNITY
End: 2024-05-25 | Stop reason: HOSPADM

## 2024-05-16 RX ORDER — FUROSEMIDE 20 MG/1
20 TABLET ORAL DAILY
Status: ON HOLD | COMMUNITY
End: 2024-05-25 | Stop reason: HOSPADM

## 2024-05-16 NOTE — PROGRESS NOTES
[facility] [place]  [address], [address]  [place] [address]  Ph: [phone] or [phone]  FAX: [phone]        Reason for consult: Neuropathy    I had the pleasure of seeing your patient in neurology consultation for his symptoms. As you would recall Frank Lisa is a 63 y.o. yo male.  The patient states that for last at least 1 year, he has been having burning pain involving his both lower extremities, upper extremities, mainly involving fourth and five digits.  Additionally, he notices pain and weakness in extremities, difficulty in hand dexterity.  The patient previously has had carpal tunnel surgery about 6 years ago.  He also has a history of C5-C6, C6-C7 ACDF.  The patient has a history of alcoholism, cervical carcinoma status post chemoradiation therapy in remission  Previously patient had EMG nerve conduction study that showed multilevel cervical radiculopathy  MRI scan of the cervical spine November 2023: Spinal canal stenosis C5-C6, C6-C7 and central disc protrusion at C2-C3 2 mm  EMG nerve conduction study November 2015 consistent with carpal tunnel syndrome    Past Medical History:   Diagnosis Date    Abnormal EKG     Acute deep vein thrombosis (DVT) of brachial vein of right upper extremity (HCC) 01/07/2023    Also- Superficial venous thrombosis of the cephalic vein in the forearm    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 Mc, orthopedic, every 3-4 months, gets steroid injection    Lezama esophagus     Bleeding gastric varices 12/29/2022    BPH (benign prostatic hypertrophy)     Cholelithiasis     Cirrhosis (HCC)     COVID-19 12/2020    pt reports he had a positive test while at [facility] in 2020, was asymptomatic    COVID-19 vaccine series completed 5/20/2021, 6/22/2021    Moderna 5/20/2021, 6/22/2021    DDD (degenerative disc

## 2024-05-17 NOTE — TELEPHONE ENCOUNTER
Had called and tried to LM for pt's wife 5/15 but could not as her voicemail box was full.     Diana from Dr. Fay's office called and stated that the pt saw Dr. Fay but he cannot get them in for an EMG yet, are either waiting for a cancellation or Dr. Fay might open up a Friday in a couple of weeks. Stated up to us if we want to see pt next week.    Called pt's wife and LM for her to call back to push back appt with Dr. Sotelo so we would have results of EMG to have Dr. Sotelo review at next appt.

## 2024-05-21 ENCOUNTER — HOSPITAL ENCOUNTER (OUTPATIENT)
Age: 64
Discharge: HOME | End: 2024-05-21
Payer: COMMERCIAL

## 2024-05-21 DIAGNOSIS — R20.0 NUMBNESS: ICD-10-CM

## 2024-05-21 LAB — RHEUMATOID FACT SER NEPH-ACNC: 23 IU/ML (ref 0–13)

## 2024-05-21 PROCEDURE — 86225 DNA ANTIBODY NATIVE: CPT

## 2024-05-21 PROCEDURE — 86334 IMMUNOFIX E-PHORESIS SERUM: CPT

## 2024-05-21 PROCEDURE — 86431 RHEUMATOID FACTOR QUANT: CPT

## 2024-05-21 PROCEDURE — 36415 COLL VENOUS BLD VENIPUNCTURE: CPT

## 2024-05-21 PROCEDURE — 84155 ASSAY OF PROTEIN SERUM: CPT

## 2024-05-21 PROCEDURE — 84165 PROTEIN E-PHORESIS SERUM: CPT

## 2024-05-23 ENCOUNTER — HOSPITAL ENCOUNTER (INPATIENT)
Age: 64
LOS: 1 days | Discharge: NURSING, REHAB OR LONG-TERM CARE | DRG: 312 | End: 2024-05-30
Attending: EMERGENCY MEDICINE | Admitting: INTERNAL MEDICINE
Payer: COMMERCIAL

## 2024-05-23 ENCOUNTER — APPOINTMENT (OUTPATIENT)
Dept: GENERAL RADIOLOGY | Age: 64
DRG: 312 | End: 2024-05-23
Payer: COMMERCIAL

## 2024-05-23 ENCOUNTER — APPOINTMENT (OUTPATIENT)
Dept: CT IMAGING | Age: 64
DRG: 312 | End: 2024-05-23
Payer: COMMERCIAL

## 2024-05-23 DIAGNOSIS — E83.42 HYPOMAGNESEMIA: ICD-10-CM

## 2024-05-23 DIAGNOSIS — R55 SYNCOPE, UNSPECIFIED SYNCOPE TYPE: Primary | ICD-10-CM

## 2024-05-23 PROBLEM — R31.29 OTHER MICROSCOPIC HEMATURIA: Status: ACTIVE | Noted: 2024-05-23

## 2024-05-23 LAB
ALBUMIN PERCENT: 47 % (ref 45–65)
ALBUMIN SERPL-MCNC: 2.8 G/DL (ref 3.2–5.2)
ALPHA 2 PERCENT: 8 % (ref 6–13)
ALPHA1 GLOB SERPL ELPH-MCNC: 0.2 G/DL (ref 0.1–0.4)
ALPHA1 GLOB SERPL ELPH-MCNC: 4 % (ref 3–6)
ALPHA2 GLOB SERPL ELPH-MCNC: 0.5 G/DL (ref 0.5–0.9)
ANION GAP SERPL CALCULATED.3IONS-SCNC: 16 MMOL/L (ref 9–17)
B-GLOBULIN SERPL ELPH-MCNC: 1 G/DL (ref 0.5–1.1)
B-GLOBULIN SERPL ELPH-MCNC: 17 % (ref 11–19)
BACTERIA URNS QL MICRO: ABNORMAL
BASOPHILS # BLD: 0 K/UL (ref 0–0.2)
BASOPHILS NFR BLD: 0 % (ref 0–2)
BILIRUB UR QL STRIP: NEGATIVE
BUN SERPL-MCNC: 9 MG/DL (ref 8–23)
CALCIUM SERPL-MCNC: 8.9 MG/DL (ref 8.6–10.4)
CASTS #/AREA URNS LPF: ABNORMAL /LPF
CHLORIDE SERPL-SCNC: 101 MMOL/L (ref 98–107)
CLARITY UR: CLEAR
CO2 SERPL-SCNC: 15 MMOL/L (ref 20–31)
COLOR UR: YELLOW
CREAT SERPL-MCNC: 0.8 MG/DL (ref 0.7–1.2)
DSDNA IGG SER QL IA: 35 IU/ML
EOSINOPHIL # BLD: 0.1 K/UL (ref 0–0.4)
EOSINOPHILS RELATIVE PERCENT: 1 % (ref 0–4)
EPI CELLS #/AREA URNS HPF: ABNORMAL /HPF
ERYTHROCYTE [DISTWIDTH] IN BLOOD BY AUTOMATED COUNT: 14.8 % (ref 11.5–14.9)
GAMMA GLOB SERPL ELPH-MCNC: 1.5 G/DL (ref 0.5–1.5)
GAMMA GLOBULIN %: 24 % (ref 9–20)
GFR, ESTIMATED: >90 ML/MIN/1.73M2
GLUCOSE SERPL-MCNC: 88 MG/DL (ref 70–99)
GLUCOSE UR STRIP-MCNC: NEGATIVE MG/DL
HCT VFR BLD AUTO: 35.1 % (ref 41–53)
HGB BLD-MCNC: 11.4 G/DL (ref 13.5–17.5)
HGB UR QL STRIP.AUTO: ABNORMAL
INR PPP: 1.2
ITYP INTERPRETATION: NORMAL
KETONES UR STRIP-MCNC: ABNORMAL MG/DL
LEUKOCYTE ESTERASE UR QL STRIP: NEGATIVE
LYMPHOCYTES NFR BLD: 0.8 K/UL (ref 1–4.8)
LYMPHOCYTES RELATIVE PERCENT: 9 % (ref 24–44)
MAGNESIUM SERPL-MCNC: 1.3 MG/DL (ref 1.6–2.6)
MCH RBC QN AUTO: 30.8 PG (ref 26–34)
MCHC RBC AUTO-ENTMCNC: 32.5 G/DL (ref 31–37)
MCV RBC AUTO: 94.8 FL (ref 80–100)
MONOCYTES NFR BLD: 0.7 K/UL (ref 0.1–1.3)
MONOCYTES NFR BLD: 8 % (ref 1–7)
NEUTROPHILS NFR BLD: 82 % (ref 36–66)
NEUTS SEG NFR BLD: 6.7 K/UL (ref 1.3–9.1)
NITRITE UR QL STRIP: NEGATIVE
PARTIAL THROMBOPLASTIN TIME: 28.6 SEC (ref 24–36)
PATH REV: NORMAL
PATHOLOGIST: ABNORMAL
PH UR STRIP: 5.5 [PH] (ref 5–8)
PHOSPHATE SERPL-MCNC: 3.4 MG/DL (ref 2.5–4.5)
PLATELET # BLD AUTO: 139 K/UL (ref 150–450)
PMV BLD AUTO: 7.9 FL (ref 6–12)
POTASSIUM SERPL-SCNC: 4.1 MMOL/L (ref 3.7–5.3)
PROT PATTERN SERPL ELPH-IMP: ABNORMAL
PROT SERPL-MCNC: 6 G/DL (ref 6.6–8.7)
PROT UR STRIP-MCNC: NEGATIVE MG/DL
PROTHROMBIN TIME: 15.1 SEC (ref 11.8–14.6)
RBC # BLD AUTO: 3.7 M/UL (ref 4.5–5.9)
RBC #/AREA URNS HPF: ABNORMAL /HPF
SODIUM SERPL-SCNC: 132 MMOL/L (ref 135–144)
SP GR UR STRIP: 1.01 (ref 1–1.03)
TOTAL PROT. SUM,%: 100 % (ref 98–102)
TOTAL PROT. SUM: 6 G/DL (ref 6.3–8.2)
TROPONIN I SERPL HS-MCNC: 22 NG/L (ref 0–22)
TROPONIN I SERPL HS-MCNC: 25 NG/L (ref 0–22)
UROBILINOGEN UR STRIP-ACNC: NORMAL EU/DL (ref 0–1)
WBC #/AREA URNS HPF: ABNORMAL /HPF
WBC OTHER # BLD: 8.3 K/UL (ref 3.5–11)

## 2024-05-23 PROCEDURE — 2060000000 HC ICU INTERMEDIATE R&B

## 2024-05-23 PROCEDURE — 72125 CT NECK SPINE W/O DYE: CPT

## 2024-05-23 PROCEDURE — 70450 CT HEAD/BRAIN W/O DYE: CPT

## 2024-05-23 PROCEDURE — 36415 COLL VENOUS BLD VENIPUNCTURE: CPT

## 2024-05-23 PROCEDURE — 85730 THROMBOPLASTIN TIME PARTIAL: CPT

## 2024-05-23 PROCEDURE — 99223 1ST HOSP IP/OBS HIGH 75: CPT | Performed by: INTERNAL MEDICINE

## 2024-05-23 PROCEDURE — 99285 EMERGENCY DEPT VISIT HI MDM: CPT

## 2024-05-23 PROCEDURE — 2580000003 HC RX 258: Performed by: INTERNAL MEDICINE

## 2024-05-23 PROCEDURE — 80048 BASIC METABOLIC PNL TOTAL CA: CPT

## 2024-05-23 PROCEDURE — 84484 ASSAY OF TROPONIN QUANT: CPT

## 2024-05-23 PROCEDURE — 71045 X-RAY EXAM CHEST 1 VIEW: CPT

## 2024-05-23 PROCEDURE — 85025 COMPLETE CBC W/AUTO DIFF WBC: CPT

## 2024-05-23 PROCEDURE — 6360000002 HC RX W HCPCS: Performed by: INTERNAL MEDICINE

## 2024-05-23 PROCEDURE — 96375 TX/PRO/DX INJ NEW DRUG ADDON: CPT

## 2024-05-23 PROCEDURE — 6370000000 HC RX 637 (ALT 250 FOR IP): Performed by: INTERNAL MEDICINE

## 2024-05-23 PROCEDURE — 84100 ASSAY OF PHOSPHORUS: CPT

## 2024-05-23 PROCEDURE — 6370000000 HC RX 637 (ALT 250 FOR IP): Performed by: EMERGENCY MEDICINE

## 2024-05-23 PROCEDURE — 0HQ0XZZ REPAIR SCALP SKIN, EXTERNAL APPROACH: ICD-10-PCS | Performed by: EMERGENCY MEDICINE

## 2024-05-23 PROCEDURE — G0378 HOSPITAL OBSERVATION PER HR: HCPCS

## 2024-05-23 PROCEDURE — 6360000002 HC RX W HCPCS: Performed by: NURSE PRACTITIONER

## 2024-05-23 PROCEDURE — 2580000003 HC RX 258: Performed by: EMERGENCY MEDICINE

## 2024-05-23 PROCEDURE — 96365 THER/PROPH/DIAG IV INF INIT: CPT

## 2024-05-23 PROCEDURE — 85610 PROTHROMBIN TIME: CPT

## 2024-05-23 PROCEDURE — 6360000002 HC RX W HCPCS: Performed by: EMERGENCY MEDICINE

## 2024-05-23 PROCEDURE — 83735 ASSAY OF MAGNESIUM: CPT

## 2024-05-23 PROCEDURE — 81001 URINALYSIS AUTO W/SCOPE: CPT

## 2024-05-23 RX ORDER — ACETAMINOPHEN 650 MG/1
650 SUPPOSITORY RECTAL EVERY 6 HOURS PRN
Status: DISCONTINUED | OUTPATIENT
Start: 2024-05-23 | End: 2024-05-30 | Stop reason: HOSPADM

## 2024-05-23 RX ORDER — SPIRONOLACTONE 25 MG/1
25 TABLET ORAL DAILY
Status: DISCONTINUED | OUTPATIENT
Start: 2024-05-23 | End: 2024-05-25

## 2024-05-23 RX ORDER — SODIUM CHLORIDE 9 MG/ML
INJECTION, SOLUTION INTRAVENOUS PRN
Status: DISCONTINUED | OUTPATIENT
Start: 2024-05-23 | End: 2024-05-30 | Stop reason: HOSPADM

## 2024-05-23 RX ORDER — DULOXETIN HYDROCHLORIDE 30 MG/1
30 CAPSULE, DELAYED RELEASE ORAL DAILY
Status: DISCONTINUED | OUTPATIENT
Start: 2024-05-23 | End: 2024-05-30 | Stop reason: HOSPADM

## 2024-05-23 RX ORDER — POTASSIUM CHLORIDE 7.45 MG/ML
10 INJECTION INTRAVENOUS PRN
Status: DISCONTINUED | OUTPATIENT
Start: 2024-05-23 | End: 2024-05-30 | Stop reason: HOSPADM

## 2024-05-23 RX ORDER — ONDANSETRON 2 MG/ML
4 INJECTION INTRAMUSCULAR; INTRAVENOUS EVERY 6 HOURS PRN
Status: DISCONTINUED | OUTPATIENT
Start: 2024-05-23 | End: 2024-05-30 | Stop reason: HOSPADM

## 2024-05-23 RX ORDER — MORPHINE SULFATE 2 MG/ML
2 INJECTION, SOLUTION INTRAMUSCULAR; INTRAVENOUS ONCE
Status: COMPLETED | OUTPATIENT
Start: 2024-05-23 | End: 2024-05-23

## 2024-05-23 RX ORDER — ONDANSETRON 4 MG/1
4 TABLET, ORALLY DISINTEGRATING ORAL ONCE
Status: COMPLETED | OUTPATIENT
Start: 2024-05-23 | End: 2024-05-23

## 2024-05-23 RX ORDER — ACETAMINOPHEN 325 MG/1
650 TABLET ORAL EVERY 6 HOURS PRN
Status: DISCONTINUED | OUTPATIENT
Start: 2024-05-23 | End: 2024-05-30 | Stop reason: HOSPADM

## 2024-05-23 RX ORDER — KETOROLAC TROMETHAMINE 30 MG/ML
15 INJECTION, SOLUTION INTRAMUSCULAR; INTRAVENOUS ONCE
Status: COMPLETED | OUTPATIENT
Start: 2024-05-23 | End: 2024-05-23

## 2024-05-23 RX ORDER — SODIUM CHLORIDE 0.9 % (FLUSH) 0.9 %
5-40 SYRINGE (ML) INJECTION EVERY 12 HOURS SCHEDULED
Status: DISCONTINUED | OUTPATIENT
Start: 2024-05-23 | End: 2024-05-30 | Stop reason: HOSPADM

## 2024-05-23 RX ORDER — FOLIC ACID 1 MG/1
1 TABLET ORAL DAILY
Status: DISCONTINUED | OUTPATIENT
Start: 2024-05-23 | End: 2024-05-30 | Stop reason: HOSPADM

## 2024-05-23 RX ORDER — POLYETHYLENE GLYCOL 3350 17 G/17G
17 POWDER, FOR SOLUTION ORAL DAILY PRN
Status: DISCONTINUED | OUTPATIENT
Start: 2024-05-23 | End: 2024-05-30 | Stop reason: HOSPADM

## 2024-05-23 RX ORDER — ONDANSETRON 4 MG/1
4 TABLET, ORALLY DISINTEGRATING ORAL EVERY 8 HOURS PRN
Status: DISCONTINUED | OUTPATIENT
Start: 2024-05-23 | End: 2024-05-30 | Stop reason: HOSPADM

## 2024-05-23 RX ORDER — MAGNESIUM SULFATE 1 G/100ML
1000 INJECTION INTRAVENOUS ONCE
Status: COMPLETED | OUTPATIENT
Start: 2024-05-23 | End: 2024-05-23

## 2024-05-23 RX ORDER — 0.9 % SODIUM CHLORIDE 0.9 %
1000 INTRAVENOUS SOLUTION INTRAVENOUS ONCE
Status: COMPLETED | OUTPATIENT
Start: 2024-05-23 | End: 2024-05-23

## 2024-05-23 RX ORDER — LOSARTAN POTASSIUM 50 MG/1
50 TABLET ORAL DAILY
Status: DISCONTINUED | OUTPATIENT
Start: 2024-05-23 | End: 2024-05-28

## 2024-05-23 RX ORDER — METOPROLOL TARTRATE 50 MG/1
50 TABLET, FILM COATED ORAL 2 TIMES DAILY
Status: DISCONTINUED | OUTPATIENT
Start: 2024-05-23 | End: 2024-05-25

## 2024-05-23 RX ORDER — FERROUS SULFATE 325(65) MG
325 TABLET ORAL 2 TIMES DAILY
Status: DISCONTINUED | OUTPATIENT
Start: 2024-05-23 | End: 2024-05-30 | Stop reason: HOSPADM

## 2024-05-23 RX ORDER — SODIUM CHLORIDE 9 MG/ML
INJECTION, SOLUTION INTRAVENOUS CONTINUOUS
Status: DISCONTINUED | OUTPATIENT
Start: 2024-05-23 | End: 2024-05-25

## 2024-05-23 RX ORDER — SODIUM CHLORIDE 0.9 % (FLUSH) 0.9 %
10 SYRINGE (ML) INJECTION PRN
Status: DISCONTINUED | OUTPATIENT
Start: 2024-05-23 | End: 2024-05-30 | Stop reason: HOSPADM

## 2024-05-23 RX ORDER — FUROSEMIDE 20 MG/1
20 TABLET ORAL DAILY
Status: DISCONTINUED | OUTPATIENT
Start: 2024-05-23 | End: 2024-05-25

## 2024-05-23 RX ORDER — SUCRALFATE 1 G/1
1 TABLET ORAL 4 TIMES DAILY
Status: DISCONTINUED | OUTPATIENT
Start: 2024-05-23 | End: 2024-05-30 | Stop reason: HOSPADM

## 2024-05-23 RX ORDER — POTASSIUM CHLORIDE 20 MEQ/1
40 TABLET, EXTENDED RELEASE ORAL PRN
Status: DISCONTINUED | OUTPATIENT
Start: 2024-05-23 | End: 2024-05-30 | Stop reason: HOSPADM

## 2024-05-23 RX ORDER — MAGNESIUM SULFATE 1 G/100ML
1000 INJECTION INTRAVENOUS PRN
Status: DISCONTINUED | OUTPATIENT
Start: 2024-05-23 | End: 2024-05-30 | Stop reason: HOSPADM

## 2024-05-23 RX ORDER — ENOXAPARIN SODIUM 100 MG/ML
40 INJECTION SUBCUTANEOUS DAILY
Status: DISCONTINUED | OUTPATIENT
Start: 2024-05-23 | End: 2024-05-30 | Stop reason: HOSPADM

## 2024-05-23 RX ORDER — PANTOPRAZOLE SODIUM 40 MG/1
40 TABLET, DELAYED RELEASE ORAL DAILY
Status: DISCONTINUED | OUTPATIENT
Start: 2024-05-23 | End: 2024-05-30 | Stop reason: HOSPADM

## 2024-05-23 RX ADMIN — KETOROLAC TROMETHAMINE 15 MG: 30 INJECTION, SOLUTION INTRAMUSCULAR at 14:34

## 2024-05-23 RX ADMIN — SODIUM CHLORIDE, PRESERVATIVE FREE 10 ML: 5 INJECTION INTRAVENOUS at 21:01

## 2024-05-23 RX ADMIN — MAGNESIUM SULFATE HEPTAHYDRATE 1000 MG: 1 INJECTION, SOLUTION INTRAVENOUS at 14:04

## 2024-05-23 RX ADMIN — PANTOPRAZOLE SODIUM 40 MG: 40 TABLET, DELAYED RELEASE ORAL at 21:01

## 2024-05-23 RX ADMIN — ENOXAPARIN SODIUM 40 MG: 100 INJECTION SUBCUTANEOUS at 17:53

## 2024-05-23 RX ADMIN — SPIRONOLACTONE 25 MG: 25 TABLET ORAL at 21:01

## 2024-05-23 RX ADMIN — ONDANSETRON 4 MG: 4 TABLET, ORALLY DISINTEGRATING ORAL at 13:16

## 2024-05-23 RX ADMIN — DULOXETINE HYDROCHLORIDE 30 MG: 30 CAPSULE, DELAYED RELEASE ORAL at 21:01

## 2024-05-23 RX ADMIN — METOPROLOL TARTRATE 50 MG: 50 TABLET, FILM COATED ORAL at 21:01

## 2024-05-23 RX ADMIN — FUROSEMIDE 20 MG: 20 TABLET ORAL at 21:01

## 2024-05-23 RX ADMIN — FOLIC ACID 1 MG: 1 TABLET ORAL at 21:01

## 2024-05-23 RX ADMIN — FERROUS SULFATE TAB 325 MG (65 MG ELEMENTAL FE) 325 MG: 325 (65 FE) TAB at 21:01

## 2024-05-23 RX ADMIN — SODIUM CHLORIDE: 9 INJECTION, SOLUTION INTRAVENOUS at 17:57

## 2024-05-23 RX ADMIN — LOSARTAN POTASSIUM 50 MG: 50 TABLET, FILM COATED ORAL at 21:01

## 2024-05-23 RX ADMIN — SODIUM CHLORIDE 1000 ML: 9 INJECTION, SOLUTION INTRAVENOUS at 13:03

## 2024-05-23 RX ADMIN — MORPHINE SULFATE 2 MG: 2 INJECTION, SOLUTION INTRAMUSCULAR; INTRAVENOUS at 16:02

## 2024-05-23 RX ADMIN — MORPHINE SULFATE 2 MG: 2 INJECTION, SOLUTION INTRAMUSCULAR; INTRAVENOUS at 21:01

## 2024-05-23 RX ADMIN — SUCRALFATE 1 G: 1 TABLET ORAL at 21:01

## 2024-05-23 ASSESSMENT — PAIN SCALES - GENERAL
PAINLEVEL_OUTOF10: 9
PAINLEVEL_OUTOF10: 10
PAINLEVEL_OUTOF10: 7
PAINLEVEL_OUTOF10: 0
PAINLEVEL_OUTOF10: 4
PAINLEVEL_OUTOF10: 8
PAINLEVEL_OUTOF10: 10

## 2024-05-23 ASSESSMENT — PAIN DESCRIPTION - LOCATION
LOCATION: HEAD

## 2024-05-23 ASSESSMENT — LIFESTYLE VARIABLES
HOW OFTEN DO YOU HAVE A DRINK CONTAINING ALCOHOL: 2-3 TIMES A WEEK
HOW MANY STANDARD DRINKS CONTAINING ALCOHOL DO YOU HAVE ON A TYPICAL DAY: 1 OR 2

## 2024-05-23 ASSESSMENT — PAIN DESCRIPTION - DESCRIPTORS
DESCRIPTORS: ACHING

## 2024-05-23 ASSESSMENT — PAIN - FUNCTIONAL ASSESSMENT
PAIN_FUNCTIONAL_ASSESSMENT: 0-10
PAIN_FUNCTIONAL_ASSESSMENT: PREVENTS OR INTERFERES SOME ACTIVE ACTIVITIES AND ADLS

## 2024-05-23 ASSESSMENT — HEART SCORE: ECG: NORMAL

## 2024-05-23 ASSESSMENT — PAIN DESCRIPTION - ORIENTATION: ORIENTATION: POSTERIOR

## 2024-05-23 ASSESSMENT — PAIN DESCRIPTION - PAIN TYPE: TYPE: ACUTE PAIN

## 2024-05-23 NOTE — H&P
[facility] Internal Medicine  Jaziel Cerda MD; Roberto Workman MD, Parmjit Parry MD, Kathi Russo MD, Doris Abad MD; Alexia Tobar MD    [facility] Internal Medicine   IN-PATIENT SERVICE   [facility]    HISTORY AND PHYSICAL EXAMINATION            Date:   5/23/2024  Patient name:  Frank Lisa  Date of admission:  5/23/2024 11:58 AM  MRN:   [id number]  Account:  [id number]  YOB: 1960  PCP:    Lopez Rosario PA  Room:   2082/2082-01  Code Status:    Full Code    Chief Complaint:     Chief Complaint   Patient presents with    Loss of Consciousness    Laceration     Occipital laceration    Headache    Nausea       History Obtained From:     Patient/EMR/Bedside RN    History of Present Illness:     Frank Lisa is a 63 y.o. Non- / non  male who presents with Loss of Consciousness, Laceration (Occipital laceration), Headache, and Nausea   and is admitted to the hospital for the management of Syncope and collapse.  63-year-old gentleman with underlying history of alcoholic advanced liver cirrhosis with severe malnutrition had PEG tube in the past, recently removed on May 10, hepatitis C, follows with gastroenterology as outpatient, history of TIPS, treated for esophageal cancer, currently in remission as per oncology, esophageal varices in the past, recent EGD for PEG removal did not show esophageal varices at this time, admitted with syncopal episode hitting his head had some laceration, status post chest,  Recently found to have new onset of atrial fibrillation seen by cardiology [id number] score was 1, was not a candidate for anticoagulation, due to cirrhosis of liver and esophageal varices and GI bleed,  Recently underwent MRI of the brain which was negative, has been worked up as outpatient for elevated MARYSOL, elevated anti-double-stranded DNA, rheumatoid factor, will follow with rheumatology as

## 2024-05-23 NOTE — ED PROVIDER NOTES
(!) 143/54   Pulse 70   Temp 98.2 °F (36.8 °C) (Oral)   Resp 20   Ht 1.778 m (5' 10\")   Wt 77.1 kg (170 lb)   SpO2 99%   BMI 24.39 kg/m²    Physical Exam  Constitutional:       General: He is not in acute distress.     Appearance: Normal appearance.   HENT:      Head:        Right Ear: External ear normal.      Left Ear: External ear normal.      Nose: Nose normal. No congestion or rhinorrhea.   Eyes:      Extraocular Movements: Extraocular movements intact.      Pupils: Pupils are equal, round, and reactive to light.   Cardiovascular:      Rate and Rhythm: Normal rate and regular rhythm.      Pulses: Normal pulses.      Heart sounds: Normal heart sounds.   Pulmonary:      Effort: Pulmonary effort is normal. No respiratory distress.      Breath sounds: Normal breath sounds.   Abdominal:      General: Bowel sounds are normal.      Palpations: Abdomen is soft.   Musculoskeletal:         General: No deformity. Normal range of motion.      Cervical back: Normal range of motion. No rigidity.   Skin:     General: Skin is warm and dry.   Neurological:      General: No focal deficit present.      Mental Status: He is alert and oriented to person, place, and time. Mental status is at baseline.   Psychiatric:         Mood and Affect: Mood normal.         Behavior: Behavior normal.         MEDICAL DECISION MAKING / ED COURSE:         1)  Number and Complexity of Problems Addressed at this Encounter      2)  Data Reviewed and Analyzed  (Lab and radiology tests/orders below in next section)    History: 0  EC  Patient Age: 1  Risk Factors: 1  Troponin: 0  Heart Score Total: 2        3)  Treatment and Disposition           63-year-old male presenting to the ER after a syncopal episode.  Patient states he does live alone at home.  Cardiac workup in the ER did not display positive signs of acute cardiac ischemia.  EKG was reviewed and interpreted by myself, the ED physician.  Patient did have a scalp laceration that was

## 2024-05-24 PROBLEM — R26.89 MULTIFACTORIAL GAIT DISORDER: Status: ACTIVE | Noted: 2021-11-30

## 2024-05-24 LAB
ALBUMIN SERPL-MCNC: 2.5 G/DL (ref 3.5–5.2)
ALP SERPL-CCNC: 133 U/L (ref 40–129)
ALT SERPL-CCNC: 10 U/L (ref 5–41)
ANION GAP SERPL CALCULATED.3IONS-SCNC: 10 MMOL/L (ref 9–17)
AST SERPL-CCNC: 41 U/L
BASOPHILS # BLD: 0 K/UL (ref 0–0.2)
BASOPHILS NFR BLD: 1 % (ref 0–2)
BILIRUB SERPL-MCNC: 2.3 MG/DL (ref 0.3–1.2)
BUN SERPL-MCNC: 15 MG/DL (ref 8–23)
CALCIUM SERPL-MCNC: 8.1 MG/DL (ref 8.6–10.4)
CHLORIDE SERPL-SCNC: 104 MMOL/L (ref 98–107)
CO2 SERPL-SCNC: 20 MMOL/L (ref 20–31)
CREAT SERPL-MCNC: 0.9 MG/DL (ref 0.7–1.2)
EKG ATRIAL RATE: 66 BPM
EKG P AXIS: 37 DEGREES
EKG P-R INTERVAL: 116 MS
EKG Q-T INTERVAL: 470 MS
EKG QRS DURATION: 82 MS
EKG QTC CALCULATION (BAZETT): 492 MS
EKG R AXIS: 45 DEGREES
EKG T AXIS: 39 DEGREES
EKG VENTRICULAR RATE: 66 BPM
EOSINOPHIL # BLD: 0.4 K/UL (ref 0–0.4)
EOSINOPHILS RELATIVE PERCENT: 8 % (ref 0–4)
ERYTHROCYTE [DISTWIDTH] IN BLOOD BY AUTOMATED COUNT: 14.7 % (ref 11.5–14.9)
GFR, ESTIMATED: >90 ML/MIN/1.73M2
GLUCOSE SERPL-MCNC: 93 MG/DL (ref 70–99)
HCT VFR BLD AUTO: 28.5 % (ref 41–53)
HGB BLD-MCNC: 9.4 G/DL (ref 13.5–17.5)
LYMPHOCYTES NFR BLD: 0.6 K/UL (ref 1–4.8)
LYMPHOCYTES RELATIVE PERCENT: 12 % (ref 24–44)
MAGNESIUM SERPL-MCNC: 1.3 MG/DL (ref 1.6–2.6)
MCH RBC QN AUTO: 30.8 PG (ref 26–34)
MCHC RBC AUTO-ENTMCNC: 33 G/DL (ref 31–37)
MCV RBC AUTO: 93.4 FL (ref 80–100)
MONOCYTES NFR BLD: 0.7 K/UL (ref 0.1–1.3)
MONOCYTES NFR BLD: 14 % (ref 1–7)
NEUTROPHILS NFR BLD: 65 % (ref 36–66)
NEUTS SEG NFR BLD: 3.4 K/UL (ref 1.3–9.1)
PLATELET # BLD AUTO: 118 K/UL (ref 150–450)
PMV BLD AUTO: 7.9 FL (ref 6–12)
POTASSIUM SERPL-SCNC: 3.7 MMOL/L (ref 3.7–5.3)
PROT SERPL-MCNC: 5.3 G/DL (ref 6.4–8.3)
RBC # BLD AUTO: 3.05 M/UL (ref 4.5–5.9)
SODIUM SERPL-SCNC: 134 MMOL/L (ref 135–144)
WBC OTHER # BLD: 5.2 K/UL (ref 3.5–11)

## 2024-05-24 PROCEDURE — 97535 SELF CARE MNGMENT TRAINING: CPT

## 2024-05-24 PROCEDURE — G0378 HOSPITAL OBSERVATION PER HR: HCPCS

## 2024-05-24 PROCEDURE — 99222 1ST HOSP IP/OBS MODERATE 55: CPT | Performed by: PSYCHIATRY & NEUROLOGY

## 2024-05-24 PROCEDURE — 85025 COMPLETE CBC W/AUTO DIFF WBC: CPT

## 2024-05-24 PROCEDURE — 2580000003 HC RX 258: Performed by: INTERNAL MEDICINE

## 2024-05-24 PROCEDURE — 6370000000 HC RX 637 (ALT 250 FOR IP): Performed by: INTERNAL MEDICINE

## 2024-05-24 PROCEDURE — 80053 COMPREHEN METABOLIC PANEL: CPT

## 2024-05-24 PROCEDURE — 83735 ASSAY OF MAGNESIUM: CPT

## 2024-05-24 PROCEDURE — 97166 OT EVAL MOD COMPLEX 45 MIN: CPT

## 2024-05-24 PROCEDURE — 6360000002 HC RX W HCPCS: Performed by: INTERNAL MEDICINE

## 2024-05-24 PROCEDURE — 97162 PT EVAL MOD COMPLEX 30 MIN: CPT

## 2024-05-24 PROCEDURE — 99233 SBSQ HOSP IP/OBS HIGH 50: CPT | Performed by: INTERNAL MEDICINE

## 2024-05-24 PROCEDURE — 36415 COLL VENOUS BLD VENIPUNCTURE: CPT

## 2024-05-24 PROCEDURE — 97116 GAIT TRAINING THERAPY: CPT

## 2024-05-24 RX ADMIN — SUCRALFATE 1 G: 1 TABLET ORAL at 17:27

## 2024-05-24 RX ADMIN — SUCRALFATE 1 G: 1 TABLET ORAL at 13:05

## 2024-05-24 RX ADMIN — SPIRONOLACTONE 25 MG: 25 TABLET ORAL at 07:37

## 2024-05-24 RX ADMIN — FOLIC ACID 1 MG: 1 TABLET ORAL at 07:37

## 2024-05-24 RX ADMIN — SUCRALFATE 1 G: 1 TABLET ORAL at 20:28

## 2024-05-24 RX ADMIN — LOSARTAN POTASSIUM 50 MG: 50 TABLET, FILM COATED ORAL at 07:37

## 2024-05-24 RX ADMIN — METOPROLOL TARTRATE 50 MG: 50 TABLET, FILM COATED ORAL at 20:28

## 2024-05-24 RX ADMIN — FERROUS SULFATE TAB 325 MG (65 MG ELEMENTAL FE) 325 MG: 325 (65 FE) TAB at 07:37

## 2024-05-24 RX ADMIN — FUROSEMIDE 20 MG: 20 TABLET ORAL at 07:37

## 2024-05-24 RX ADMIN — MAGNESIUM SULFATE HEPTAHYDRATE 1000 MG: 1 INJECTION, SOLUTION INTRAVENOUS at 21:38

## 2024-05-24 RX ADMIN — ACETAMINOPHEN 650 MG: 325 TABLET ORAL at 20:28

## 2024-05-24 RX ADMIN — SUCRALFATE 1 G: 1 TABLET ORAL at 07:37

## 2024-05-24 RX ADMIN — METOPROLOL TARTRATE 50 MG: 50 TABLET, FILM COATED ORAL at 07:37

## 2024-05-24 RX ADMIN — SODIUM CHLORIDE: 9 INJECTION, SOLUTION INTRAVENOUS at 07:37

## 2024-05-24 RX ADMIN — PANTOPRAZOLE SODIUM 40 MG: 40 TABLET, DELAYED RELEASE ORAL at 07:44

## 2024-05-24 RX ADMIN — MAGNESIUM SULFATE HEPTAHYDRATE 1000 MG: 1 INJECTION, SOLUTION INTRAVENOUS at 20:28

## 2024-05-24 RX ADMIN — MAGNESIUM SULFATE HEPTAHYDRATE 1000 MG: 1 INJECTION, SOLUTION INTRAVENOUS at 17:27

## 2024-05-24 RX ADMIN — DULOXETINE HYDROCHLORIDE 30 MG: 30 CAPSULE, DELAYED RELEASE ORAL at 07:37

## 2024-05-24 RX ADMIN — MAGNESIUM SULFATE HEPTAHYDRATE 1000 MG: 1 INJECTION, SOLUTION INTRAVENOUS at 18:29

## 2024-05-24 RX ADMIN — FERROUS SULFATE TAB 325 MG (65 MG ELEMENTAL FE) 325 MG: 325 (65 FE) TAB at 20:28

## 2024-05-24 RX ADMIN — ENOXAPARIN SODIUM 40 MG: 100 INJECTION SUBCUTANEOUS at 07:37

## 2024-05-24 ASSESSMENT — PAIN DESCRIPTION - DESCRIPTORS: DESCRIPTORS: SHARP

## 2024-05-24 ASSESSMENT — PAIN SCALES - GENERAL
PAINLEVEL_OUTOF10: 5
PAINLEVEL_OUTOF10: 7

## 2024-05-24 ASSESSMENT — PAIN DESCRIPTION - LOCATION: LOCATION: HEAD

## 2024-05-24 ASSESSMENT — PAIN - FUNCTIONAL ASSESSMENT: PAIN_FUNCTIONAL_ASSESSMENT: ACTIVITIES ARE NOT PREVENTED

## 2024-05-24 NOTE — DISCHARGE INSTR - COC
Continuity of Care Form    Patient Name: Frank Lisa   :  1960  MRN:  [id number]    Admit date:  2024  Discharge date:  2024    Code Status Order: Full Code   Advance Directives:     Admitting Physician:  Alexia Tobar MD  PCP: Lopez Rosario PA    Discharging Nurse: Diana Mercado [facility] Unit/Room#: /-01  Discharging Unit Phone Number: [phone]    Emergency Contact:   Extended Emergency Contact Information  Primary Emergency Contact: Nilam Lisa  Address: [address] [place] [address]  Home Phone: [phone]  Work Phone: [phone]  Mobile Phone: [phone]  Relation: Ex-Spouse    Past Surgical History:  Past Surgical History:   Procedure Laterality Date    BUNIONECTOMY      twice on right side    BUNIONECTOMY Left     CARPAL TUNNEL RELEASE Right     COLON SURGERY  10/10/2023    EXPLORATORY LAPAROTOMY, REVERSAL ILEOSTOMY WITH ILEOCOLOSTOMY, LYSIS OF ADHESIONS,    COLONOSCOPY      at age 39    COLONOSCOPY  10/05/2016    polyps-pathology tubular adenoma, and abnormal looking mucosa right colon-pathology-tubular adenoma    COLONOSCOPY N/A 2018    COLONOSCOPY POLYPECTOMY COLD BIOPSY performed by Augusto Cordova MD at [facility] OR    COLONOSCOPY  2018    Small polyp in the sigmoid colon and excised with biopsy forceps--tubular adenoma    COLONOSCOPY N/A 2022    COLONOSCOPY POLYPECTOMY performed by Augusto Cordova MD at [facility] OR    ENDOSCOPY, COLON, DIAGNOSTIC      EGD    ESOPHAGOGASTRODUODENOSCOPY  2022    ESOPHAGOGASTRODUODENOSCOPY N/A 2023    IR PORT PLACEMENT EQUAL OR GREATER THAN 5 YEARS  2021    IR PORT PLACEMENT EQUAL OR GREATER THAN 5 YEARS 2021 [facility] SPECIAL PROCEDURES    IR TIPS INSERTION  2022    IR TIPS INSERTION 2022 Scar VELEZ MD [facility] SPECIAL PROCEDURES    KNEE SURGERY Left     cyst removed    LAPAROTOMY N/A 2023    LAPAROTOMY EXPLORATORY, RIGHT COLECTOMY, CREATION IF

## 2024-05-24 NOTE — CARE COORDINATION
Case Management Assessment  Initial Evaluation    Date/Time of Evaluation: 5/24/2024 3:18 PM  Assessment Completed by: Dianne Jarquin RN    If patient is discharged prior to next notation, then this note serves as note for discharge by case management.    Patient Name: Frank Lisa                   YOB: 1960  Diagnosis: Syncope and collapse [R55]  Hypomagnesemia [E83.42]  Syncope, unspecified syncope type [R55]                   Date / Time: 5/23/2024 11:58 AM    Patient Admission Status: Observation   Readmission Risk (Low < 19, Mod (19-27), High > 27): Readmission Risk Score: 35.5    Current PCP: Lopez Rosario PA  PCP verified by CM? Yes    Chart Reviewed: Yes      History Provided by: Patient, Medical Record  Patient Orientation: Alert and Oriented    Patient Cognition: Alert    Hospitalization in the last 30 days (Readmission):  Yes    If yes, Readmission Assessment in CM Navigator will be completed.    Readmission Assessment  Number of Days since last admission?: 8-30 days  Previous Disposition: SNF  Who is being Interviewed: Patient  What was the patient's/caregiver's perception as to why they think they needed to return back to the hospital?: (S) Other (Comment) (syncope, unresponsive, staples in head)  Did you visit your Primary Care Physician after you left the hospital, before you returned this time?: Yes  Did you see a specialist, such as Cardiac, Pulmonary, Orthopedic Physician, etc. after you left the hospital?: Yes  Who advised the patient to return to the hospital?: Self-referral  Does the patient report anything that got in the way of taking their medications?: No  In our efforts to provide the best possible care to you and others like you, can you think of anything that we could have done to help you after you left the hospital the first time, so that you might not have needed to return so soon?: Other (Comment) (no)     Advance Directives:      Code Status: Full Code

## 2024-05-25 LAB
AMMONIA PLAS-SCNC: 56 UMOL/L (ref 16–60)
MAGNESIUM SERPL-MCNC: 2 MG/DL (ref 1.6–2.6)

## 2024-05-25 PROCEDURE — 6360000002 HC RX W HCPCS: Performed by: INTERNAL MEDICINE

## 2024-05-25 PROCEDURE — 99239 HOSP IP/OBS DSCHRG MGMT >30: CPT | Performed by: INTERNAL MEDICINE

## 2024-05-25 PROCEDURE — 83735 ASSAY OF MAGNESIUM: CPT

## 2024-05-25 PROCEDURE — 6370000000 HC RX 637 (ALT 250 FOR IP): Performed by: INTERNAL MEDICINE

## 2024-05-25 PROCEDURE — 6370000000 HC RX 637 (ALT 250 FOR IP): Performed by: NURSE PRACTITIONER

## 2024-05-25 PROCEDURE — G0378 HOSPITAL OBSERVATION PER HR: HCPCS

## 2024-05-25 PROCEDURE — 36415 COLL VENOUS BLD VENIPUNCTURE: CPT

## 2024-05-25 PROCEDURE — 2580000003 HC RX 258: Performed by: INTERNAL MEDICINE

## 2024-05-25 PROCEDURE — 82140 ASSAY OF AMMONIA: CPT

## 2024-05-25 RX ORDER — LACTULOSE 10 G/15ML
30 SOLUTION ORAL 3 TIMES DAILY
Status: DISCONTINUED | OUTPATIENT
Start: 2024-05-25 | End: 2024-05-30 | Stop reason: HOSPADM

## 2024-05-25 RX ADMIN — METOPROLOL TARTRATE 50 MG: 50 TABLET, FILM COATED ORAL at 09:15

## 2024-05-25 RX ADMIN — SUCRALFATE 1 G: 1 TABLET ORAL at 20:34

## 2024-05-25 RX ADMIN — FERROUS SULFATE TAB 325 MG (65 MG ELEMENTAL FE) 325 MG: 325 (65 FE) TAB at 20:34

## 2024-05-25 RX ADMIN — ONDANSETRON 4 MG: 4 TABLET, ORALLY DISINTEGRATING ORAL at 16:32

## 2024-05-25 RX ADMIN — SUCRALFATE 1 G: 1 TABLET ORAL at 14:34

## 2024-05-25 RX ADMIN — SUCRALFATE 1 G: 1 TABLET ORAL at 16:32

## 2024-05-25 RX ADMIN — FOLIC ACID 1 MG: 1 TABLET ORAL at 09:15

## 2024-05-25 RX ADMIN — SODIUM CHLORIDE, PRESERVATIVE FREE 10 ML: 5 INJECTION INTRAVENOUS at 20:34

## 2024-05-25 RX ADMIN — SUCRALFATE 1 G: 1 TABLET ORAL at 09:15

## 2024-05-25 RX ADMIN — SPIRONOLACTONE 25 MG: 25 TABLET ORAL at 09:15

## 2024-05-25 RX ADMIN — ENOXAPARIN SODIUM 40 MG: 100 INJECTION SUBCUTANEOUS at 09:16

## 2024-05-25 RX ADMIN — LACTULOSE 30 G: 20 SOLUTION ORAL at 21:25

## 2024-05-25 RX ADMIN — DULOXETINE HYDROCHLORIDE 30 MG: 30 CAPSULE, DELAYED RELEASE ORAL at 09:15

## 2024-05-25 RX ADMIN — FUROSEMIDE 20 MG: 20 TABLET ORAL at 09:15

## 2024-05-25 RX ADMIN — SODIUM CHLORIDE, PRESERVATIVE FREE 10 ML: 5 INJECTION INTRAVENOUS at 09:18

## 2024-05-25 RX ADMIN — PANTOPRAZOLE SODIUM 40 MG: 40 TABLET, DELAYED RELEASE ORAL at 09:15

## 2024-05-25 RX ADMIN — FERROUS SULFATE TAB 325 MG (65 MG ELEMENTAL FE) 325 MG: 325 (65 FE) TAB at 09:15

## 2024-05-25 ASSESSMENT — PAIN SCALES - GENERAL
PAINLEVEL_OUTOF10: 0

## 2024-05-25 NOTE — CARE COORDINATION
ONGOING DISCHARGE PLAN:    Patient is alert and oriented x4.    Spoke with patient regarding discharge plan and patient confirms that plan is now SNF. Pt states he would like to go back to [facility] as he was recently d/c'ed from there.                       Post Acute Facility/Agency List     Provided patient with the following list, the list includes the overall star ratings obtained from CMS per the Medicare Web site (www.Medicare.gov):     [] Long Term Acute Care Facilities  [] Acute Inpatient Rehabilitation Facilities  [x] Skilled Nursing Facilities  [] Home Care    Provided verbal instructions on how to utilize the QR Code to obtain additional detailed star ratings from www.Medicare.gov     offered to print and provide the detailed list:    []Accepted   [x]Declined    Referral sent to [facility] and updated Dr. Workman that pt is now agreeable to SNF.    VNS: [facility] following as well.    Will continue to follow for additional discharge needs.    If patient is discharged prior to next notation, then this note serves as note for discharge by case management.    Electronically signed by Leonila Chapa RN on 5/25/2024 at 9:26 AM

## 2024-05-26 LAB
ANION GAP SERPL CALCULATED.3IONS-SCNC: 11 MMOL/L (ref 9–17)
BUN SERPL-MCNC: 8 MG/DL (ref 8–23)
CALCIUM SERPL-MCNC: 9 MG/DL (ref 8.6–10.4)
CHLORIDE SERPL-SCNC: 102 MMOL/L (ref 98–107)
CO2 SERPL-SCNC: 25 MMOL/L (ref 20–31)
CREAT SERPL-MCNC: 0.8 MG/DL (ref 0.7–1.2)
GFR, ESTIMATED: >90 ML/MIN/1.73M2
GLUCOSE SERPL-MCNC: 102 MG/DL (ref 70–99)
POTASSIUM SERPL-SCNC: 3.9 MMOL/L (ref 3.7–5.3)
SODIUM SERPL-SCNC: 138 MMOL/L (ref 135–144)

## 2024-05-26 PROCEDURE — 6370000000 HC RX 637 (ALT 250 FOR IP): Performed by: INTERNAL MEDICINE

## 2024-05-26 PROCEDURE — G0378 HOSPITAL OBSERVATION PER HR: HCPCS

## 2024-05-26 PROCEDURE — 99232 SBSQ HOSP IP/OBS MODERATE 35: CPT | Performed by: INTERNAL MEDICINE

## 2024-05-26 PROCEDURE — 80048 BASIC METABOLIC PNL TOTAL CA: CPT

## 2024-05-26 PROCEDURE — 6370000000 HC RX 637 (ALT 250 FOR IP): Performed by: NURSE PRACTITIONER

## 2024-05-26 PROCEDURE — 6360000002 HC RX W HCPCS: Performed by: INTERNAL MEDICINE

## 2024-05-26 PROCEDURE — 2580000003 HC RX 258: Performed by: INTERNAL MEDICINE

## 2024-05-26 PROCEDURE — 36415 COLL VENOUS BLD VENIPUNCTURE: CPT

## 2024-05-26 RX ADMIN — SODIUM CHLORIDE, PRESERVATIVE FREE 10 ML: 5 INJECTION INTRAVENOUS at 19:55

## 2024-05-26 RX ADMIN — METOPROLOL TARTRATE 25 MG: 25 TABLET, FILM COATED ORAL at 09:06

## 2024-05-26 RX ADMIN — SUCRALFATE 1 G: 1 TABLET ORAL at 19:55

## 2024-05-26 RX ADMIN — FOLIC ACID 1 MG: 1 TABLET ORAL at 09:05

## 2024-05-26 RX ADMIN — METOPROLOL TARTRATE 25 MG: 25 TABLET, FILM COATED ORAL at 19:55

## 2024-05-26 RX ADMIN — FERROUS SULFATE TAB 325 MG (65 MG ELEMENTAL FE) 325 MG: 325 (65 FE) TAB at 19:55

## 2024-05-26 RX ADMIN — SODIUM CHLORIDE, PRESERVATIVE FREE 10 ML: 5 INJECTION INTRAVENOUS at 09:08

## 2024-05-26 RX ADMIN — SUCRALFATE 1 G: 1 TABLET ORAL at 17:20

## 2024-05-26 RX ADMIN — SUCRALFATE 1 G: 1 TABLET ORAL at 09:05

## 2024-05-26 RX ADMIN — FERROUS SULFATE TAB 325 MG (65 MG ELEMENTAL FE) 325 MG: 325 (65 FE) TAB at 09:06

## 2024-05-26 RX ADMIN — LACTULOSE 30 G: 20 SOLUTION ORAL at 15:29

## 2024-05-26 RX ADMIN — ENOXAPARIN SODIUM 40 MG: 100 INJECTION SUBCUTANEOUS at 09:06

## 2024-05-26 RX ADMIN — PANTOPRAZOLE SODIUM 40 MG: 40 TABLET, DELAYED RELEASE ORAL at 09:05

## 2024-05-26 RX ADMIN — ACETAMINOPHEN 650 MG: 325 TABLET ORAL at 09:11

## 2024-05-26 RX ADMIN — SUCRALFATE 1 G: 1 TABLET ORAL at 15:29

## 2024-05-26 RX ADMIN — DULOXETINE HYDROCHLORIDE 30 MG: 30 CAPSULE, DELAYED RELEASE ORAL at 09:06

## 2024-05-26 RX ADMIN — ONDANSETRON 4 MG: 4 TABLET, ORALLY DISINTEGRATING ORAL at 09:13

## 2024-05-26 ASSESSMENT — PAIN SCALES - GENERAL: PAINLEVEL_OUTOF10: 8

## 2024-05-26 ASSESSMENT — PAIN DESCRIPTION - LOCATION: LOCATION: HEAD

## 2024-05-26 NOTE — FLOWSHEET NOTE
05/25/24 1903   Treatment Team Notification   Reason for Communication Review case;Medication concern   Name of Team Member Notified ARACELIS Khan   Treatment Team Role Advanced Practice Nurse   Method of Communication Secure Message   Response Waiting for response   Notification Time 1903     During [facility], it was discussed that patient's home dose of lactulose was not ordered during admission.  Patient did admit to feeling \"off\" and having some hallucinations.  RN messaged NP about resuming lactulose.

## 2024-05-26 NOTE — CARE COORDINATION
DISCHARGE PLANNING NOTE:    Spoke with patient yesterday regarding discharge plan, and he stated he would like to go to SNF - [facility], referral sent. Awaiting acceptance and will need insurance authorization.    Will continue to follow for additional discharge needs.    Electronically signed by Leonila Chapa RN on 5/26/2024 at 9:00 AM

## 2024-05-27 LAB
ANION GAP SERPL CALCULATED.3IONS-SCNC: 8 MMOL/L (ref 9–17)
BUN SERPL-MCNC: 9 MG/DL (ref 8–23)
CALCIUM SERPL-MCNC: 9.2 MG/DL (ref 8.6–10.4)
CHLORIDE SERPL-SCNC: 103 MMOL/L (ref 98–107)
CO2 SERPL-SCNC: 27 MMOL/L (ref 20–31)
CREAT SERPL-MCNC: 0.9 MG/DL (ref 0.7–1.2)
GFR, ESTIMATED: >90 ML/MIN/1.73M2
GLUCOSE SERPL-MCNC: 104 MG/DL (ref 70–99)
POTASSIUM SERPL-SCNC: 4.5 MMOL/L (ref 3.7–5.3)
SODIUM SERPL-SCNC: 138 MMOL/L (ref 135–144)

## 2024-05-27 PROCEDURE — 6370000000 HC RX 637 (ALT 250 FOR IP): Performed by: NURSE PRACTITIONER

## 2024-05-27 PROCEDURE — 97535 SELF CARE MNGMENT TRAINING: CPT

## 2024-05-27 PROCEDURE — G0378 HOSPITAL OBSERVATION PER HR: HCPCS

## 2024-05-27 PROCEDURE — 36415 COLL VENOUS BLD VENIPUNCTURE: CPT

## 2024-05-27 PROCEDURE — 6360000002 HC RX W HCPCS: Performed by: INTERNAL MEDICINE

## 2024-05-27 PROCEDURE — 2580000003 HC RX 258: Performed by: INTERNAL MEDICINE

## 2024-05-27 PROCEDURE — 99232 SBSQ HOSP IP/OBS MODERATE 35: CPT | Performed by: INTERNAL MEDICINE

## 2024-05-27 PROCEDURE — 80048 BASIC METABOLIC PNL TOTAL CA: CPT

## 2024-05-27 PROCEDURE — 6370000000 HC RX 637 (ALT 250 FOR IP): Performed by: INTERNAL MEDICINE

## 2024-05-27 RX ADMIN — FOLIC ACID 1 MG: 1 TABLET ORAL at 08:11

## 2024-05-27 RX ADMIN — FERROUS SULFATE TAB 325 MG (65 MG ELEMENTAL FE) 325 MG: 325 (65 FE) TAB at 08:11

## 2024-05-27 RX ADMIN — SODIUM CHLORIDE, PRESERVATIVE FREE 10 ML: 5 INJECTION INTRAVENOUS at 08:19

## 2024-05-27 RX ADMIN — ONDANSETRON 4 MG: 4 TABLET, ORALLY DISINTEGRATING ORAL at 09:34

## 2024-05-27 RX ADMIN — SODIUM CHLORIDE, PRESERVATIVE FREE 10 ML: 5 INJECTION INTRAVENOUS at 20:12

## 2024-05-27 RX ADMIN — FERROUS SULFATE TAB 325 MG (65 MG ELEMENTAL FE) 325 MG: 325 (65 FE) TAB at 20:12

## 2024-05-27 RX ADMIN — DULOXETINE HYDROCHLORIDE 30 MG: 30 CAPSULE, DELAYED RELEASE ORAL at 08:11

## 2024-05-27 RX ADMIN — SUCRALFATE 1 G: 1 TABLET ORAL at 08:11

## 2024-05-27 RX ADMIN — SUCRALFATE 1 G: 1 TABLET ORAL at 20:12

## 2024-05-27 RX ADMIN — SUCRALFATE 1 G: 1 TABLET ORAL at 13:01

## 2024-05-27 RX ADMIN — SUCRALFATE 1 G: 1 TABLET ORAL at 17:34

## 2024-05-27 RX ADMIN — ACETAMINOPHEN 650 MG: 325 TABLET ORAL at 09:34

## 2024-05-27 RX ADMIN — ENOXAPARIN SODIUM 40 MG: 100 INJECTION SUBCUTANEOUS at 08:11

## 2024-05-27 RX ADMIN — LACTULOSE 30 G: 20 SOLUTION ORAL at 08:11

## 2024-05-27 RX ADMIN — PANTOPRAZOLE SODIUM 40 MG: 40 TABLET, DELAYED RELEASE ORAL at 08:11

## 2024-05-27 ASSESSMENT — PAIN SCALES - GENERAL
PAINLEVEL_OUTOF10: 5
PAINLEVEL_OUTOF10: 9

## 2024-05-27 ASSESSMENT — PAIN DESCRIPTION - LOCATION
LOCATION: HEAD
LOCATION: HEAD

## 2024-05-27 ASSESSMENT — PAIN DESCRIPTION - DESCRIPTORS: DESCRIPTORS: ACHING

## 2024-05-27 NOTE — CARE COORDINATION
DISCHARGE PLANNING NOTE:    Review of previous CM notes reports that the patient would like to return to [facility]. This writer reached out to Graciela from [facility] regarding referral, awaiting response.     Will continue to follow for additional discharge needs.    Electronically signed by Sharee Snyder RN on 5/27/2024 at 11:43 AM    ADDENDUM:    This writer was notified by Nakia from [facility] that the patient has been accepted. They will need updated PT/OT notes to start authorization. This writer sent [employer] messages to both PT and OT requesting the patient be marked as a priority for evaluation.    Electronically signed by Sharee Snyder RN on 5/27/2024 at 12:01 PM

## 2024-05-28 ENCOUNTER — TELEPHONE (OUTPATIENT)
Dept: UROLOGY | Age: 64
End: 2024-05-28

## 2024-05-28 LAB
ANION GAP SERPL CALCULATED.3IONS-SCNC: 9 MMOL/L (ref 9–17)
BACTERIA URNS QL MICRO: ABNORMAL
BILIRUB UR QL STRIP: NEGATIVE
BUN SERPL-MCNC: 10 MG/DL (ref 8–23)
CALCIUM SERPL-MCNC: 9.2 MG/DL (ref 8.6–10.4)
CASTS #/AREA URNS LPF: ABNORMAL /LPF
CHLORIDE SERPL-SCNC: 102 MMOL/L (ref 98–107)
CLARITY UR: CLEAR
CO2 SERPL-SCNC: 26 MMOL/L (ref 20–31)
COLOR UR: YELLOW
CREAT SERPL-MCNC: 0.9 MG/DL (ref 0.7–1.2)
EKG ATRIAL RATE: 57 BPM
EKG P AXIS: 46 DEGREES
EKG P-R INTERVAL: 164 MS
EKG Q-T INTERVAL: 488 MS
EKG QRS DURATION: 86 MS
EKG QTC CALCULATION (BAZETT): 474 MS
EKG R AXIS: 57 DEGREES
EKG T AXIS: 49 DEGREES
EKG VENTRICULAR RATE: 57 BPM
EPI CELLS #/AREA URNS HPF: ABNORMAL /HPF
GFR, ESTIMATED: >90 ML/MIN/1.73M2
GLUCOSE SERPL-MCNC: 111 MG/DL (ref 70–99)
GLUCOSE UR STRIP-MCNC: NEGATIVE MG/DL
HGB UR QL STRIP.AUTO: ABNORMAL
KETONES UR STRIP-MCNC: NEGATIVE MG/DL
LEUKOCYTE ESTERASE UR QL STRIP: NEGATIVE
NITRITE UR QL STRIP: NEGATIVE
PH UR STRIP: 8 [PH] (ref 5–8)
POTASSIUM SERPL-SCNC: 4.1 MMOL/L (ref 3.7–5.3)
PROT UR STRIP-MCNC: NEGATIVE MG/DL
RBC #/AREA URNS HPF: ABNORMAL /HPF
SODIUM SERPL-SCNC: 137 MMOL/L (ref 135–144)
SP GR UR STRIP: 1.01 (ref 1–1.03)
UROBILINOGEN UR STRIP-ACNC: NORMAL EU/DL (ref 0–1)
WBC #/AREA URNS HPF: ABNORMAL /HPF

## 2024-05-28 PROCEDURE — 80048 BASIC METABOLIC PNL TOTAL CA: CPT

## 2024-05-28 PROCEDURE — 97116 GAIT TRAINING THERAPY: CPT

## 2024-05-28 PROCEDURE — 6370000000 HC RX 637 (ALT 250 FOR IP): Performed by: INTERNAL MEDICINE

## 2024-05-28 PROCEDURE — 6370000000 HC RX 637 (ALT 250 FOR IP): Performed by: NURSE PRACTITIONER

## 2024-05-28 PROCEDURE — 99233 SBSQ HOSP IP/OBS HIGH 50: CPT | Performed by: INTERNAL MEDICINE

## 2024-05-28 PROCEDURE — 81001 URINALYSIS AUTO W/SCOPE: CPT

## 2024-05-28 PROCEDURE — 36415 COLL VENOUS BLD VENIPUNCTURE: CPT

## 2024-05-28 PROCEDURE — G0378 HOSPITAL OBSERVATION PER HR: HCPCS

## 2024-05-28 PROCEDURE — 97110 THERAPEUTIC EXERCISES: CPT

## 2024-05-28 PROCEDURE — 6360000002 HC RX W HCPCS: Performed by: INTERNAL MEDICINE

## 2024-05-28 PROCEDURE — 2580000003 HC RX 258: Performed by: INTERNAL MEDICINE

## 2024-05-28 RX ORDER — MIDODRINE HYDROCHLORIDE 2.5 MG/1
2.5 TABLET ORAL
Status: DISCONTINUED | OUTPATIENT
Start: 2024-05-28 | End: 2024-05-30 | Stop reason: HOSPADM

## 2024-05-28 RX ORDER — LACTULOSE 10 G/15ML
30 SOLUTION ORAL 3 TIMES DAILY
Qty: 946 ML | Refills: 1 | Status: SHIPPED | OUTPATIENT
Start: 2024-05-28

## 2024-05-28 RX ADMIN — ENOXAPARIN SODIUM 40 MG: 100 INJECTION SUBCUTANEOUS at 09:28

## 2024-05-28 RX ADMIN — SODIUM CHLORIDE, PRESERVATIVE FREE 10 ML: 5 INJECTION INTRAVENOUS at 09:32

## 2024-05-28 RX ADMIN — FERROUS SULFATE TAB 325 MG (65 MG ELEMENTAL FE) 325 MG: 325 (65 FE) TAB at 09:28

## 2024-05-28 RX ADMIN — SUCRALFATE 1 G: 1 TABLET ORAL at 21:28

## 2024-05-28 RX ADMIN — DULOXETINE HYDROCHLORIDE 30 MG: 30 CAPSULE, DELAYED RELEASE ORAL at 09:28

## 2024-05-28 RX ADMIN — SUCRALFATE 1 G: 1 TABLET ORAL at 13:03

## 2024-05-28 RX ADMIN — LACTULOSE 30 G: 20 SOLUTION ORAL at 09:28

## 2024-05-28 RX ADMIN — SUCRALFATE 1 G: 1 TABLET ORAL at 17:26

## 2024-05-28 RX ADMIN — MIDODRINE HYDROCHLORIDE 2.5 MG: 2.5 TABLET ORAL at 17:26

## 2024-05-28 RX ADMIN — FERROUS SULFATE TAB 325 MG (65 MG ELEMENTAL FE) 325 MG: 325 (65 FE) TAB at 21:29

## 2024-05-28 RX ADMIN — SUCRALFATE 1 G: 1 TABLET ORAL at 09:28

## 2024-05-28 RX ADMIN — PANTOPRAZOLE SODIUM 40 MG: 40 TABLET, DELAYED RELEASE ORAL at 09:28

## 2024-05-28 RX ADMIN — LACTULOSE 30 G: 20 SOLUTION ORAL at 21:37

## 2024-05-28 RX ADMIN — ONDANSETRON 4 MG: 4 TABLET, ORALLY DISINTEGRATING ORAL at 09:42

## 2024-05-28 RX ADMIN — SODIUM CHLORIDE, PRESERVATIVE FREE 10 ML: 5 INJECTION INTRAVENOUS at 21:40

## 2024-05-28 RX ADMIN — MIDODRINE HYDROCHLORIDE 2.5 MG: 2.5 TABLET ORAL at 13:03

## 2024-05-28 RX ADMIN — METOPROLOL TARTRATE 25 MG: 25 TABLET, FILM COATED ORAL at 09:28

## 2024-05-28 RX ADMIN — FOLIC ACID 1 MG: 1 TABLET ORAL at 09:28

## 2024-05-28 NOTE — CONSULTS
Comprehensive Nutrition Assessment    Type and Reason for Visit:  Initial    Nutrition Recommendations/Plan:   Modify current diet to MARY.   Modify oral nutrition supplement to Ensure High Protein once a day.     Malnutrition Assessment:  Malnutrition Status:  At risk for malnutrition (Comment) (05/28/24 4787)    Context:  Acute Illness     Findings of the 6 clinical characteristics of malnutrition:  Energy Intake:  Unable to assess  Weight Loss:  Unable to assess     Body Fat Loss:  Unable to assess     Muscle Mass Loss:  Unable to assess    Fluid Accumulation:        Strength:  Not Performed    Nutrition Assessment:    Pt admittd for loss of consciouness, head laceration, headache, and neusea. Pt was sleeping when visited, per RN pt ate all of breakfast and lunch with no current concerns.    Nutrition Related Findings:    No edema. Hx: liver cirrhosis, esophageal cancer, HTN, GERD, and Hep C. Labs and meds reviewed. Wound Type:  (tramatic head laceration)       Current Nutrition Intake & Therapies:    Average Meal Intake: %  Average Supplements Intake: Unable to assess  ADULT DIET; Regular; Low Fat/Low Chol/High Fiber/MARY  ADULT ORAL NUTRITION SUPPLEMENT; Breakfast, Lunch, Dinner; Standard High Calorie/High Protein Oral Supplement    Anthropometric Measures:  Height: 177.8 cm (5' 10\")  Ideal Body Weight (IBW): 166 lbs (75 kg)    Admission Body Weight: 77.1 kg (169 lb 15.6 oz)  Current Body Weight: 80.5 kg (177 lb 7.5 oz), 106.9 % IBW. Weight Source: Bed Scale  Current BMI (kg/m2): 25.5  Usual Body Weight: 78.9 kg (174 lb) (1 month ago)  % Weight Change (Calculated): 2                    BMI Categories: Overweight (BMI 25.0-29.9)    Estimated Daily Nutrient Needs:  Energy Requirements Based On: Formula  Weight Used for Energy Requirements: Current  Energy (kcal/day): 1760 - 2080 kcal/d based on [facility] with 1.1-1.3 factor  Weight Used for Protein Requirements: Ideal  Protein (g/day): 98 g/day per 
Date:   5/24/2024  Patient name: Frank Lisa  Date of admission:  5/23/2024 11:58 AM  MRN:   [id number]  YOB: 1960  PCP: Lopez Rosario PA    Reason for Admission: Syncope and collapse [R55]  Hypomagnesemia [E83.42]  Syncope, unspecified syncope type [R55]    Cardiology consult       Referring physician: Dr Alexia Tobar    Impression    Admission 5/23/2024 syncope status post micturation no focal neurodeficit , high-sensitivity troponin 22, serum magnesium 1.3 ECG showed sinus rhythm, T wave inversion V1 to V3, prolonged QT  Postural hypotension/vasovagal  Admission 4/23/2024 A-fib with RVR, new onset  Converted to sinus rhythm 4/27/2024  Severe hypokalemia and hypomagnesemia on admission  Prolonged QT interval secondary to low potassium and magnesium  History of alcoholic liver cirrhosis, status post TIPS(transjugular intrahepatic portosystemic shunt)  History of esophageal adenocarcinoma status post radiation therapy  Multiple abdominal surgeries  Previous history of GI bleed multiple times/gastroesophageal varices  Episodes of confusion, hallucination, agitation  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  Since that he went to the bathroom and he urinated in the standing position and when he turned around he passed out.  When he woke up he found himself on the floor and managed to crawl to the room and called EMS.  When he woke up he did not have any chest pain or headache, nausea or vomiting.  No previous 
contrast: BRAIN/VENTRICLES: There is no acute intracranial hemorrhage, mass effect or midline shift.  No abnormal extra-axial fluid collection.  The gray-white differentiation is maintained without evidence of an acute infarct.  There is no evidence of hydrocephalus. ORBITS: The visualized portion of the orbits demonstrate no acute abnormality. SINUSES: The visualized paranasal sinuses and mastoid air cells demonstrate no acute abnormality. SOFT TISSUES/SKULL:  No acute abnormality of the visualized skull or soft tissues. CT Cervical Spine: BONES/ALIGNMENT: There is no acute fracture or traumatic malalignment. DEGENERATIVE CHANGES: No significant degenerative changes. SOFT TISSUES: There is no prevertebral soft tissue swelling.     No acute intracranial abnormality. No evidence of acute cervical spine fracture .     CT CERVICAL SPINE WO CONTRAST    Result Date: 5/23/2024  EXAMINATION: CT OF THE HEAD WITHOUT CONTRAST; CT OF THE CERVICAL SPINE WITHOUT CONTRAST 5/23/2024 12:32 pm; 5/23/2024 12:34 pm TECHNIQUE: CT of the head was performed without the administration of intravenous contrast. Automated exposure control, iterative reconstruction, and/or weight based adjustment of the mA/kV was utilized to reduce the radiation dose to as low as reasonably achievable.; CT of the cervical spine was performed without the administration of intravenous contrast. Multiplanar reformatted images are provided for review. Automated exposure control, iterative reconstruction, and/or weight based adjustment of the mA/kV was utilized to reduce the radiation dose to as low as reasonably achievable. COMPARISON: None. HISTORY: ORDERING SYSTEM PROVIDED HISTORY: syncope TECHNOLOGIST PROVIDED HISTORY: syncope Decision Support Exception - unselect if not a suspected or confirmed emergency medical condition->Emergency Medical Condition (MA) Reason for Exam: fall, laceraction to back of head; ORDERING SYSTEM PROVIDED HISTORY: fall after syncope 
encounter of 05/23/24    CT HEAD WO CONTRAST    Narrative  EXAMINATION:  CT OF THE HEAD WITHOUT CONTRAST; CT OF THE CERVICAL SPINE WITHOUT CONTRAST  5/23/2024 12:32 pm; 5/23/2024 12:34 pm    TECHNIQUE:  CT of the head was performed without the administration of intravenous  contrast. Automated exposure control, iterative reconstruction, and/or weight  based adjustment of the mA/kV was utilized to reduce the radiation dose to as  low as reasonably achievable.; CT of the cervical spine was performed without  the administration of intravenous contrast. Multiplanar reformatted images  are provided for review. Automated exposure control, iterative  reconstruction, and/or weight based adjustment of the mA/kV was utilized to  reduce the radiation dose to as low as reasonably achievable.    COMPARISON:  None.    HISTORY:  ORDERING SYSTEM PROVIDED HISTORY: syncope  TECHNOLOGIST PROVIDED HISTORY:  syncope    Decision Support Exception - unselect if not a suspected or confirmed  emergency medical condition->Emergency Medical Condition (MA)  Reason for Exam: fall, laceraction to back of head; ORDERING SYSTEM PROVIDED  HISTORY: fall after syncope  TECHNOLOGIST PROVIDED HISTORY:  fall after syncope  Decision Support Exception - unselect if not a suspected or confirmed  emergency medical condition->Emergency Medical Condition (MA)  Reason for Exam: fall    FINDINGS:  CT Head without contrast:    BRAIN/VENTRICLES: There is no acute intracranial hemorrhage, mass effect or  midline shift.  No abnormal extra-axial fluid collection.  The gray-white  differentiation is maintained without evidence of an acute infarct.  There is  no evidence of hydrocephalus.    ORBITS: The visualized portion of the orbits demonstrate no acute abnormality.    SINUSES: The visualized paranasal sinuses and mastoid air cells demonstrate  no acute abnormality.    SOFT TISSUES/SKULL:  No acute abnormality of the visualized skull or soft  tissues.    CT

## 2024-05-28 NOTE — CARE COORDINATION
Message left for admissions at [facility] to notify that updated therapy notes are in.     Insurance auth will be started today.

## 2024-05-28 NOTE — TELEPHONE ENCOUNTER
Please call patient.  He needs to be scheduled with Dr. Griffith as a new patient for hospital follow-up microhematuria.

## 2024-05-29 PROBLEM — R42 DIZZINESS: Status: ACTIVE | Noted: 2024-05-29

## 2024-05-29 PROCEDURE — 1200000000 HC SEMI PRIVATE

## 2024-05-29 PROCEDURE — 99232 SBSQ HOSP IP/OBS MODERATE 35: CPT | Performed by: INTERNAL MEDICINE

## 2024-05-29 PROCEDURE — 97535 SELF CARE MNGMENT TRAINING: CPT

## 2024-05-29 PROCEDURE — G0378 HOSPITAL OBSERVATION PER HR: HCPCS

## 2024-05-29 PROCEDURE — 6370000000 HC RX 637 (ALT 250 FOR IP): Performed by: INTERNAL MEDICINE

## 2024-05-29 PROCEDURE — 6370000000 HC RX 637 (ALT 250 FOR IP): Performed by: NURSE PRACTITIONER

## 2024-05-29 PROCEDURE — 97116 GAIT TRAINING THERAPY: CPT

## 2024-05-29 PROCEDURE — 2580000003 HC RX 258: Performed by: INTERNAL MEDICINE

## 2024-05-29 PROCEDURE — 6360000002 HC RX W HCPCS: Performed by: INTERNAL MEDICINE

## 2024-05-29 RX ADMIN — SUCRALFATE 1 G: 1 TABLET ORAL at 17:12

## 2024-05-29 RX ADMIN — ONDANSETRON 4 MG: 4 TABLET, ORALLY DISINTEGRATING ORAL at 23:08

## 2024-05-29 RX ADMIN — SUCRALFATE 1 G: 1 TABLET ORAL at 12:35

## 2024-05-29 RX ADMIN — SODIUM CHLORIDE, PRESERVATIVE FREE 10 ML: 5 INJECTION INTRAVENOUS at 20:03

## 2024-05-29 RX ADMIN — DULOXETINE HYDROCHLORIDE 30 MG: 30 CAPSULE, DELAYED RELEASE ORAL at 09:20

## 2024-05-29 RX ADMIN — METOPROLOL TARTRATE 12.5 MG: 25 TABLET, FILM COATED ORAL at 20:01

## 2024-05-29 RX ADMIN — SUCRALFATE 1 G: 1 TABLET ORAL at 09:18

## 2024-05-29 RX ADMIN — MIDODRINE HYDROCHLORIDE 2.5 MG: 2.5 TABLET ORAL at 09:20

## 2024-05-29 RX ADMIN — SUCRALFATE 1 G: 1 TABLET ORAL at 20:01

## 2024-05-29 RX ADMIN — SODIUM CHLORIDE, PRESERVATIVE FREE 10 ML: 5 INJECTION INTRAVENOUS at 09:21

## 2024-05-29 RX ADMIN — MIDODRINE HYDROCHLORIDE 2.5 MG: 2.5 TABLET ORAL at 12:35

## 2024-05-29 RX ADMIN — LACTULOSE 30 G: 20 SOLUTION ORAL at 14:24

## 2024-05-29 RX ADMIN — ENOXAPARIN SODIUM 40 MG: 100 INJECTION SUBCUTANEOUS at 09:18

## 2024-05-29 RX ADMIN — METOPROLOL TARTRATE 12.5 MG: 25 TABLET, FILM COATED ORAL at 09:19

## 2024-05-29 RX ADMIN — MIDODRINE HYDROCHLORIDE 2.5 MG: 2.5 TABLET ORAL at 17:09

## 2024-05-29 RX ADMIN — LACTULOSE 30 G: 20 SOLUTION ORAL at 09:20

## 2024-05-29 RX ADMIN — FERROUS SULFATE TAB 325 MG (65 MG ELEMENTAL FE) 325 MG: 325 (65 FE) TAB at 20:01

## 2024-05-29 RX ADMIN — ACETAMINOPHEN 650 MG: 325 TABLET ORAL at 12:34

## 2024-05-29 RX ADMIN — FOLIC ACID 1 MG: 1 TABLET ORAL at 09:18

## 2024-05-29 RX ADMIN — FERROUS SULFATE TAB 325 MG (65 MG ELEMENTAL FE) 325 MG: 325 (65 FE) TAB at 09:20

## 2024-05-29 RX ADMIN — PANTOPRAZOLE SODIUM 40 MG: 40 TABLET, DELAYED RELEASE ORAL at 09:20

## 2024-05-29 ASSESSMENT — PAIN DESCRIPTION - LOCATION
LOCATION: HEAD

## 2024-05-29 ASSESSMENT — PAIN DESCRIPTION - DESCRIPTORS
DESCRIPTORS: ACHING

## 2024-05-29 ASSESSMENT — PAIN SCALES - GENERAL
PAINLEVEL_OUTOF10: 0
PAINLEVEL_OUTOF10: 0
PAINLEVEL_OUTOF10: 7
PAINLEVEL_OUTOF10: 5
PAINLEVEL_OUTOF10: 7

## 2024-05-29 NOTE — CARE COORDINATION
Writer received a call from Utilization [employer]. Patient was changed to IP.     Patient given IMM and signed copy placed in the chart. Electronically signed by Dianne Jarquin RN on 5/29/2024 at 1:08 PM

## 2024-05-30 VITALS
TEMPERATURE: 98.5 F | BODY MASS INDEX: 26.45 KG/M2 | WEIGHT: 184.75 LBS | DIASTOLIC BLOOD PRESSURE: 45 MMHG | OXYGEN SATURATION: 98 % | RESPIRATION RATE: 16 BRPM | HEIGHT: 70 IN | HEART RATE: 68 BPM | SYSTOLIC BLOOD PRESSURE: 113 MMHG

## 2024-05-30 PROCEDURE — 99239 HOSP IP/OBS DSCHRG MGMT >30: CPT | Performed by: INTERNAL MEDICINE

## 2024-05-30 PROCEDURE — 6360000002 HC RX W HCPCS: Performed by: INTERNAL MEDICINE

## 2024-05-30 PROCEDURE — 6370000000 HC RX 637 (ALT 250 FOR IP): Performed by: NURSE PRACTITIONER

## 2024-05-30 PROCEDURE — 6370000000 HC RX 637 (ALT 250 FOR IP): Performed by: INTERNAL MEDICINE

## 2024-05-30 PROCEDURE — 2580000003 HC RX 258: Performed by: INTERNAL MEDICINE

## 2024-05-30 RX ORDER — MIDODRINE HYDROCHLORIDE 2.5 MG/1
2.5 TABLET ORAL
Qty: 90 TABLET | Refills: 3 | Status: SHIPPED | OUTPATIENT
Start: 2024-05-30

## 2024-05-30 RX ADMIN — SODIUM CHLORIDE, PRESERVATIVE FREE 10 ML: 5 INJECTION INTRAVENOUS at 09:24

## 2024-05-30 RX ADMIN — FERROUS SULFATE TAB 325 MG (65 MG ELEMENTAL FE) 325 MG: 325 (65 FE) TAB at 09:23

## 2024-05-30 RX ADMIN — SUCRALFATE 1 G: 1 TABLET ORAL at 12:00

## 2024-05-30 RX ADMIN — METOPROLOL TARTRATE 12.5 MG: 25 TABLET, FILM COATED ORAL at 09:23

## 2024-05-30 RX ADMIN — MIDODRINE HYDROCHLORIDE 2.5 MG: 2.5 TABLET ORAL at 12:00

## 2024-05-30 RX ADMIN — LACTULOSE 30 G: 20 SOLUTION ORAL at 15:47

## 2024-05-30 RX ADMIN — PANTOPRAZOLE SODIUM 40 MG: 40 TABLET, DELAYED RELEASE ORAL at 09:24

## 2024-05-30 RX ADMIN — FOLIC ACID 1 MG: 1 TABLET ORAL at 09:23

## 2024-05-30 RX ADMIN — SUCRALFATE 1 G: 1 TABLET ORAL at 09:24

## 2024-05-30 RX ADMIN — DULOXETINE HYDROCHLORIDE 30 MG: 30 CAPSULE, DELAYED RELEASE ORAL at 09:23

## 2024-05-30 RX ADMIN — MIDODRINE HYDROCHLORIDE 2.5 MG: 2.5 TABLET ORAL at 09:23

## 2024-05-30 RX ADMIN — ENOXAPARIN SODIUM 40 MG: 100 INJECTION SUBCUTANEOUS at 09:24

## 2024-05-30 RX ADMIN — LACTULOSE 30 G: 20 SOLUTION ORAL at 09:22

## 2024-05-30 NOTE — DISCHARGE SUMMARY
mouth daily            STOP taking these medications      furosemide 20 MG tablet  Commonly known as: LASIX     losartan 50 MG tablet  Commonly known as: COZAAR     nadolol 20 MG tablet  Commonly known as: CORGARD     potassium chloride 20 MEQ Tbcr extended release tablet  Commonly known as: K-TAB     spironolactone 25 MG tablet  Commonly known as: ALDACTONE     sucralfate 1 GM tablet  Commonly known as: CARAFATE               Where to Get Your Medications        These medications were sent to [id number] - [address] -  [phone] - F [phone]  [address] [place] [id number]      Phone: [phone]   lactulose 10 GM/15ML solution  metoprolol tartrate 25 MG tablet  midodrine 2.5 MG tablet         Time Spent on discharge is  35 mins in patient examination, evaluation, counseling as well as medication reconciliation, prescriptions for required medications, discharge plan and follow up.    Electronically signed by   Jessika Abad MD  5/30/2024  10:56 AM      Thank you Lopez Luke, PA for the opportunity to be involved in this patient's care.

## 2024-05-30 NOTE — PLAN OF CARE
Problem: Discharge Planning  Goal: Discharge to home or other facility with appropriate resources  5/29/2024 0025 by Lorei Feliciano, RN  Outcome: Progressing  Flowsheets (Taken 5/28/2024 2135)  Discharge to home or other facility with appropriate resources:   Identify barriers to discharge with patient and caregiver   Arrange for needed discharge resources and transportation as appropriate   Identify discharge learning needs (meds, wound care, etc)   Arrange for interpreters to assist at discharge as needed   Refer to discharge planning if patient needs post-hospital services based on physician order or complex needs related to functional status, cognitive ability or social support system        Problem: Safety - Adult  Goal: Free from fall injury  5/29/2024 0025 by Lorie Feliciano, RN  Outcome: Progressing, Patient remains free of incidence/ injury. Bed remains in low position. Side rails up x2.         Problem: Cardiovascular - Adult  Goal: Maintains optimal cardiac output and hemodynamic stability  5/29/2024 0025 by Lorie Feliciano, RN  Outcome: Progressing  Flowsheets (Taken 5/28/2024 2135)  Maintains optimal cardiac output and hemodynamic stability:   Monitor blood pressure and heart rate   Monitor urine output and notify Licensed Independent Practitioner for values outside of normal range   Assess for signs of decreased cardiac output     
  Problem: Discharge Planning  Goal: Discharge to home or other facility with appropriate resources  5/27/2024 0243 by Vanessa Colon RN  Outcome: Progressing     Problem: Safety - Adult  Goal: Free from fall injury  5/27/2024 0243 by Vanessa Colon RN  Outcome: Progressing     Problem: Cardiovascular - Adult  Goal: Maintains optimal cardiac output and hemodynamic stability  5/27/2024 0243 by Vanessa Colon RN  Outcome: Progressing     
  Problem: Discharge Planning  Goal: Discharge to home or other facility with appropriate resources  5/29/2024 2029 by Lorie Feliciano, RN  Outcome: Progressing  Flowsheets (Taken 5/29/2024 2020)  Discharge to home or other facility with appropriate resources:   Identify barriers to discharge with patient and caregiver   Arrange for needed discharge resources and transportation as appropriate   Identify discharge learning needs (meds, wound care, etc)   Arrange for interpreters to assist at discharge as needed   Refer to discharge planning if patient needs post-hospital services based on physician order or complex needs related to functional status, cognitive ability or social support system       Problem: Safety - Adult  Goal: Free from fall injury  Outcome: Progressing, Patient remains free of incidence/ injury. Bed remains in low position. Side rails up x2.       
  Problem: Pain  Goal: Verbalizes/displays adequate comfort level or baseline comfort level  5/25/2024 1722 by Kelsey Omalley, RN  Outcome: Progressing     Problem: Discharge Planning  Goal: Discharge to home or other facility with appropriate resources  5/25/2024 1722 by Kelsey Omalley, RN  Outcome: Progressing     Problem: Safety - Adult  Goal: Free from fall injury  5/25/2024 1722 by Kelsey Omalley, RN  Outcome: Progressing     Problem: ABCDS Injury Assessment  Goal: Absence of physical injury  5/25/2024 1722 by Kelsey Omalley, RN  Outcome: Progressing     
  Problem: Pain  Goal: Verbalizes/displays adequate comfort level or baseline comfort level  5/27/2024 2146 by Lorie Feliciano, RN  Outcome: Progressing, denies pain during assessment.      Problem: Discharge Planning  Goal: Discharge to home or other facility with appropriate resources  5/27/2024 2146 by Lorie Feliciano, RN  Outcome: Progressing  Flowsheets (Taken 5/27/2024 2015)  Discharge to home or other facility with appropriate resources:   Identify barriers to discharge with patient and caregiver   Arrange for needed discharge resources and transportation as appropriate   Identify discharge learning needs (meds, wound care, etc)   Arrange for interpreters to assist at discharge as needed   Refer to discharge planning if patient needs post-hospital services based on physician order or complex needs related to functional status, cognitive ability or social support system        Problem: Safety - Adult  Goal: Free from fall injury  5/27/2024 2146 by Lorie Feliciano, RN  Outcome: Progressing, Patient remains free of incidence/ injury. Bed remains in low position. Side rails up x2.       
  Problem: Pain  Goal: Verbalizes/displays adequate comfort level or baseline comfort level  Outcome: Progressing     Problem: Discharge Planning  Goal: Discharge to home or other facility with appropriate resources  5/27/2024 1625 by Stacy Hernandez RN  Outcome: Progressing  5/27/2024 0243 by Vanessa Colon RN  Outcome: Progressing     Problem: Safety - Adult  Goal: Free from fall injury  5/27/2024 1625 by Stacy Hernandez RN  Outcome: Progressing  5/27/2024 0243 by Vanessa Colon RN  Outcome: Progressing     Problem: ABCDS Injury Assessment  Goal: Absence of physical injury  Outcome: Progressing     Problem: Cardiovascular - Adult  Goal: Maintains optimal cardiac output and hemodynamic stability  5/27/2024 1625 by Stacy Hernandez RN  Outcome: Progressing  5/27/2024 0243 by Vanessa oClon RN  Outcome: Progressing     
  Problem: Pain  Goal: Verbalizes/displays adequate comfort level or baseline comfort level  Outcome: Progressing     Problem: Discharge Planning  Goal: Discharge to home or other facility with appropriate resources  Outcome: Progressing     Problem: Safety - Adult  Goal: Free from fall injury  Outcome: Progressing     Problem: ABCDS Injury Assessment  Goal: Absence of physical injury  Outcome: Progressing     
  Problem: Pain  Goal: Verbalizes/displays adequate comfort level or baseline comfort level  Outcome: Progressing     Problem: Discharge Planning  Goal: Discharge to home or other facility with appropriate resources  Outcome: Progressing     Problem: Safety - Adult  Goal: Free from fall injury  Outcome: Progressing     Problem: ABCDS Injury Assessment  Goal: Absence of physical injury  Outcome: Progressing     
  Problem: Pain  Goal: Verbalizes/displays adequate comfort level or baseline comfort level  Outcome: Progressing     Problem: Discharge Planning  Goal: Discharge to home or other facility with appropriate resources  Outcome: Progressing     Problem: Safety - Adult  Goal: Free from fall injury  Outcome: Progressing     Problem: ABCDS Injury Assessment  Goal: Absence of physical injury  Outcome: Progressing     Problem: Cardiovascular - Adult  Goal: Maintains optimal cardiac output and hemodynamic stability  Outcome: Progressing     
  Problem: Pain  Goal: Verbalizes/displays adequate comfort level or baseline comfort level  Outcome: Progressing     Problem: Discharge Planning  Goal: Discharge to home or other facility with appropriate resources  Outcome: Progressing     Problem: Safety - Adult  Goal: Free from fall injury  Outcome: Progressing     Problem: ABCDS Injury Assessment  Goal: Absence of physical injury  Outcome: Progressing     Problem: Cardiovascular - Adult  Goal: Maintains optimal cardiac output and hemodynamic stability  Outcome: Progressing     Problem: Nutrition Deficit:  Goal: Optimize nutritional status  Outcome: Progressing     
  Problem: Pain  Goal: Verbalizes/displays adequate comfort level or baseline comfort level  Outcome: Progressing  Flowsheets (Taken 5/29/2024 1446)  Verbalizes/displays adequate comfort level or baseline comfort level:   Assess pain using appropriate pain scale   Administer analgesics based on type and severity of pain and evaluate response   Implement non-pharmacological measures as appropriate and evaluate response     Problem: Discharge Planning  Goal: Discharge to home or other facility with appropriate resources  Outcome: Progressing  Flowsheets (Taken 5/29/2024 1446)  Discharge to home or other facility with appropriate resources:   Arrange for needed discharge resources and transportation as appropriate   Identify discharge learning needs (meds, wound care, etc)     Problem: Nutrition Deficit:  Goal: Optimize nutritional status  Outcome: Progressing  Flowsheets (Taken 5/29/2024 1446)  Nutrient intake appropriate for improving, restoring, or maintaining nutritional needs:   Assess nutritional status and recommend course of action   Recommend appropriate diets, oral nutritional supplements, and vitamin/mineral supplements   Order, calculate, and assess calorie counts as needed   Provide specific nutrition education to patient or family as appropriate     Problem: Skin/Tissue Integrity  Goal: Absence of new skin breakdown  Description: 1.  Monitor for areas of redness and/or skin breakdown  2.  Assess vascular access sites hourly  3.  Every 4-6 hours minimum:  Change oxygen saturation probe site  4.  Every 4-6 hours:  If on nasal continuous positive airway pressure, respiratory therapy assess nares and determine need for appliance change or resting period.  Outcome: Progressing  Note: Absence of skin breakdown, repositions self PRN     
  Problem: Safety - Adult  Goal: Free from fall injury  5/26/2024 0254 by Lyubov Diaz, RN  Outcome: Progressing  Note: Bed in lowest and locked position.  2 bedrails used for pt safety.  Bed alarm in use in pt's room. Staff assist patient to and from the bathroom. Hourly rounds done by staff and RN.  Phone and call light within pt's reach.         Problem: Cardiovascular - Adult  Goal: Maintains optimal cardiac output and hemodynamic stability  Outcome: Progressing  Note: Telemetry remains in use throughout the shift.  Cardiac strip printed and posted in chart.  Patient did admit to some dizziness at the beginning of the shift.  Cardiology is on the case.  Some medications were removed.       
19-Jan-2023

## 2024-05-30 NOTE — CARE COORDINATION
Patient insurance authorization denied per admissions at [facility]. Plan for patient to return home with [facility]. Devante from [facility] able to accept patient back for services and notified of discharge today. Will be scheduled for start of services tomorrow 5/31

## 2024-05-30 NOTE — PROGRESS NOTES
Physical Therapy Cancel Note      DATE: 2024    NAME: Frank Lisa  MRN: [id number]   : 1960      Patient not seen this date for Physical Therapy due to:    Patient Declined: Attempted to see pt at 1057. Pt declined at this time, states feeling extremely dizzy at this time and says he thinks he may be hallucinating. Pt politely declines and does not want to mobilize at this time. Will continue to follow for PT needs.      Electronically signed by Amisha Valero PTA on 2024 at 11:35 AM    
     [facility] Internal Medicine  Jaziel Cerda MD; Roberto Workman MD, Parmjit Parry MD, Kathi Russo MD, Doris Abad MD; Alexia Tobar MD    [facility] Internal Medicine   IN-PATIENT SERVICE   [facility]    HISTORY AND PHYSICAL EXAMINATION            Date:   5/28/2024  Patient name:  Frank Lisa  Date of admission:  5/23/2024 11:58 AM  MRN:   [id number]  Account:  [id number]  YOB: 1960  PCP:    Lopez Rosario PA  Room:   2057/2057-01  Code Status:    Full Code    Chief Complaint:     Chief Complaint   Patient presents with    Loss of Consciousness    Laceration     Occipital laceration    Headache    Nausea       History Obtained From:     Patient/EMR/Bedside RN    History of Present Illness:     Frank Lisa is a 63 y.o. Non- / non  male who presents with Loss of Consciousness, Laceration (Occipital laceration), Headache, and Nausea   and is admitted to the hospital for the management of Syncope.  63-year-old gentleman with underlying history of alcoholic advanced liver cirrhosis with severe malnutrition had PEG tube in the past, recently removed on May 10, hepatitis C, follows with gastroenterology as outpatient, history of TIPS, treated for esophageal cancer, currently in remission as per oncology, esophageal varices in the past, recent EGD for PEG removal did not show esophageal varices at this time, admitted with syncopal episode hitting his head had some laceration, status post chest,  Recently found to have new onset of atrial fibrillation seen by cardiology [id number] score was 1, was not a candidate for anticoagulation, due to cirrhosis of liver and esophageal varices and GI bleed,  Recently underwent MRI of the brain which was negative, has been worked up as outpatient for elevated MARYSOL, elevated anti-double-stranded DNA, rheumatoid factor, will follow with rheumatology as outpatient.  5/24  Patient, 
     [facility] Internal Medicine  Jaziel Cerda MD; Roberto Workman MD, Parmjit Parry MD, Kathi Russo MD, Doris Abad MD; Alexia Tobar MD    [facility] Internal Medicine   IN-PATIENT SERVICE   [facility]    HISTORY AND PHYSICAL EXAMINATION            Date:   5/29/2024  Patient name:  Frank Lisa  Date of admission:  5/23/2024 11:58 AM  MRN:   [id number]  Account:  [id number]  YOB: 1960  PCP:    Lopez Rosario PA  Room:   2057/2057-01  Code Status:    Full Code    Chief Complaint:     Chief Complaint   Patient presents with    Loss of Consciousness    Laceration     Occipital laceration    Headache    Nausea       History Obtained From:     Patient/EMR/Bedside RN    History of Present Illness:     Frank Lisa is a 63 y.o. Non- / non  male who presents with Loss of Consciousness, Laceration (Occipital laceration), Headache, and Nausea   and is admitted to the hospital for the management of Syncope.  63-year-old gentleman with underlying history of alcoholic advanced liver cirrhosis with severe malnutrition had PEG tube in the past, recently removed on May 10, hepatitis C, follows with gastroenterology as outpatient, history of TIPS, treated for esophageal cancer, currently in remission as per oncology, esophageal varices in the past, recent EGD for PEG removal did not show esophageal varices at this time, admitted with syncopal episode hitting his head had some laceration, status post chest,  Recently found to have new onset of atrial fibrillation seen by cardiology [id number] score was 1, was not a candidate for anticoagulation, due to cirrhosis of liver and esophageal varices and GI bleed,  Recently underwent MRI of the brain which was negative, has been worked up as outpatient for elevated MARYSOL, elevated anti-double-stranded DNA, rheumatoid factor, will follow with rheumatology as outpatient.  5/24  Patient, 
     [facility] Internal Medicine  Jaziel Cerda MD; Roberto Workman MD, Parmjit Parry MD, Kathi Russo MD, Doris Abad MD; Alexia Tobar MD    [facility] Internal Medicine   IN-PATIENT SERVICE   [facility]    HISTORY AND PHYSICAL EXAMINATION            Date:   5/26/2024  Patient name:  Frank Lisa  Date of admission:  5/23/2024 11:58 AM  MRN:   [id number]  Account:  [id number]  YOB: 1960  PCP:    Lopez Rosario PA  Room:   2057/2057-01  Code Status:    Full Code    Chief Complaint:     Chief Complaint   Patient presents with    Loss of Consciousness    Laceration     Occipital laceration    Headache    Nausea       History Obtained From:     Patient/EMR/Bedside RN    History of Present Illness:     Frank Lisa is a 63 y.o. Non- / non  male who presents with Loss of Consciousness, Laceration (Occipital laceration), Headache, and Nausea   and is admitted to the hospital for the management of Syncope.  63-year-old gentleman with underlying history of alcoholic advanced liver cirrhosis with severe malnutrition had PEG tube in the past, recently removed on May 10, hepatitis C, follows with gastroenterology as outpatient, history of TIPS, treated for esophageal cancer, currently in remission as per oncology, esophageal varices in the past, recent EGD for PEG removal did not show esophageal varices at this time, admitted with syncopal episode hitting his head had some laceration, status post chest,  Recently found to have new onset of atrial fibrillation seen by cardiology [id number] score was 1, was not a candidate for anticoagulation, due to cirrhosis of liver and esophageal varices and GI bleed,  Recently underwent MRI of the brain which was negative, has been worked up as outpatient for elevated MARYSOL, elevated anti-double-stranded DNA, rheumatoid factor, will follow with rheumatology as outpatient.  5/24  Patient, 
   05/24/24 1229   Encounter Summary   Encounter Overview/Reason Volunteer Encounter;Attempted Encounter   Service Provided For Patient not available  (pt sleeping)       
   05/24/24 2034   Encounter Summary   Encounter Overview/Reason  Encounter   Service Provided For Patient   Assessment/Intervention/Outcome   Outcome Refused/Declined       
   05/26/24 1352   Encounter Summary   Encounter Overview/Reason Volunteer Encounter   Service Provided For Patient   Assessment/Intervention/Outcome   Outcome Refused/Declined       
  Physician Progress Note      PATIENT:               ALHAJI COOLEY  CSN #:                  [id number]  :                       1960  ADMIT DATE:       2024 11:58 AM  DISCH DATE:  RESPONDING  PROVIDER #:        Jessika Abad MD          QUERY TEXT:    Pt admitted with syncope.  Per 24 Cardiology progress note: syncope most likely postural   hypotension.  Per 25 IM progress note: Cardiology has DCd Lasix, Aldactone, reduced   dose of Lopressor to half    If possible, please document in progress notes and discharge summary the cause   of syncope if able to be determined:    The medical record reflects the following:  Risk Factors: multiple co-morbid conditions, alcoholic cirrhosis, on multiple   BP meds  Clinical Indicators: syncope post maturation, Per  24 cardiology progress   note: \"Syncope- several postural hypotension (on admission was on metoprolol   50 mg twice daily, losartan 50 mg a day, Aldactone 25 mg a day, Lasix 20 mg a   day\"  Treatment: DCd Lasix, Aldactone, reduced dose of metoprolol to half, then   further reduced metoprolol to 12.5 twice daily,  started Midodrine    Jackeline Cuenca, MSN, RN, CCDS, CRCR  Clinical   .  Options provided:  -- Syncope due to postural hypotension due to medications  -- Syncope due to postural hypotension, not related to medications  -- Defer to Cardiology consultant documentation regarding syncope due to   postural hypotension  -- Other - I will add my own diagnosis  -- Disagree - Not applicable / Not valid  -- Disagree - Clinically unable to determine / Unknown  -- Refer to Clinical Documentation Reviewer    PROVIDER RESPONSE TEXT:    This patient has syncope due to postural hypotension due to medications    Query created by: Jackeline Cuenca on 2024 12:14 PM      Electronically signed by:  Jessika Abad MD 2024 12:13 PM          
[facility]   OCCUPATIONAL THERAPY MISSED TREATMENT NOTE   INPATIENT   Date: 24  Patient Name: Frank Lisa       Room:   MRN: [id number]   Account #: [id number]    : 1960  (63 y.o.)  Gender: male   Referring Practitioner: Roberto Workman MD  Diagnosis: Syncope and collapse, Occipital laceration, head trauma workup which was negative , MRI brain negative.  Patient found positive for orthostatic     new onset of atrial fibrillation       REASON FOR MISSED TREATMENT:  Patient declined   -    Pt agitated upon therapist arrival and declined participation in therapy, reports \"maybe later.\" Pt states he did not get much sleep last night and everytime he falls asleep someone comes into his room. Will attempt later as able.      Electronically signed by Ellen Mathew OT on 24 at 2:19 PM EDT    
DATE: 2024    NAME: Frank Lisa  MRN: [id number]   : 1960    Patient not seen this date for Physical Therapy due to:      [] Cancel by RN or physician due to:    [] Hemodialysis    [] Critical Lab Value Level     [] Blood transfusion in progress    [] Acute or unstable cardiovascular status   _MAP < 55 or more than >115  _HR < 40 or > 130    [] Acute or unstable pulmonary status   -FiO2 > 60%   _RR < 5 or >40    _O2 sats < 85%    [] Strict Bedrest    [] Off Unit for surgery or procedure    [] Off Unit for testing       [] Pending imaging to R/O fracture    [] Refusal by Patient      [x] Other Patient unavailable c other staff x 2 attempts, patient being discharged home today.      [] PT being discontinued at this time. Patient independent. No further needs.     [] PT being discontinued at this time as the patient has been transferred to hospice care. No further needs.      Jocelyne Cornejo, PTA         
Date:   5/25/2024  Patient name: Frank Lisa  Date of admission:  5/23/2024 11:58 AM  MRN:   [id number]  YOB: 1960  PCP: Lopez Rosario PA    Reason for Admission: Syncope and collapse [R55]  Hypomagnesemia [E83.42]  Syncope, unspecified syncope type [R55]    Cardiology follow up : Syncope       Referring physician: Dr Alexia Tobar     Impression     Admission 5/23/2024 syncope status post micturation no focal neurodeficit , high-sensitivity troponin 22, serum magnesium 1.3 ECG showed sinus rhythm, T wave inversion V1 to V3, prolonged QT  Syncope -severe postural hypotension (on admission he was on metoprolol 50 twice daily, losartan 50 a day, Aldactone 25 a day, Lasix 20 mg a day)  Admission 4/23/2024 A-fib with RVR, new onset  Converted to sinus rhythm 4/27/2024  Normal LV systolic function ejection fraction 55 to 60% no significant valvular abnormality  Severe hypokalemia and hypomagnesemia on admission  Prolonged QT interval secondary to low potassium and magnesium  History of alcoholic liver cirrhosis, status post TIPS(transjugular intrahepatic portosystemic shunt)  History of esophageal adenocarcinoma status post radiation therapy  Multiple abdominal surgeries  Previous history of GI bleed multiple times/gastroesophageal varices  Episodes of confusion, hallucination, agitation  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  Since that he went to the bathroom and he urinated in the standing position and when he 
Date:   5/26/2024  Patient name: Frank Lisa  Date of admission:  5/23/2024 11:58 AM  MRN:   [id number]  YOB: 1960  PCP: Lopez Rosario PA    Reason for Admission: Syncope and collapse [R55]  Hypomagnesemia [E83.42]  Syncope, unspecified syncope type [R55]    Cardiology follow up : Syncope        Referring physician: Dr Alexia Tobar     Impression     Admission 5/23/2024 syncope status post micturation no focal neurodeficit , high-sensitivity troponin 22, serum magnesium 1.3 ECG showed sinus rhythm, T wave inversion V1 to V3, prolonged QT  Syncope -severe postural hypotension (on admission he was on metoprolol 50 twice daily, losartan 50 a day, Aldactone 25 a day, Lasix 20 mg a day)  Admission 4/23/2024 A-fib with RVR, new onset  Converted to sinus rhythm 4/27/2024  Normal LV systolic function ejection fraction 55 to 60% no significant valvular abnormality  Severe hypokalemia and hypomagnesemia on admission  Prolonged QT interval secondary to low potassium and magnesium  History of alcoholic liver cirrhosis, status post TIPS(transjugular intrahepatic portosystemic shunt)  History of esophageal adenocarcinoma status post radiation therapy  Multiple abdominal surgeries  Previous history of GI bleed multiple times/gastroesophageal varices  Episodes of confusion, hallucination, agitation  Peripheral neuropathy 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  Since that he went to the bathroom and he urinated in 
Date:   5/28/2024  Patient name: Frank Lisa  Date of admission:  5/23/2024 11:58 AM  MRN:   [id number]  YOB: 1960  PCP: Lopez Rosario PA    Reason for Admission: Syncope and collapse [R55]  Hypomagnesemia [E83.42]  Syncope, unspecified syncope type [R55]    Cardiology follow-up: Syncope, severe postural hypotension       Referring physician: Dr Alexia Tobar     Impression     Admission 5/23/2024 syncope status post micturation no focal neurodeficit , high-sensitivity troponin 22, serum magnesium 1.3 ECG showed sinus rhythm, T wave inversion V1 to V3, prolonged QT  Syncope -severe postural hypotension (on admission he was on metoprolol 50 twice daily, losartan 50 a day, Aldactone 25 a day, Lasix 20 mg a day)  Admission 4/23/2024 A-fib with RVR, new onset  Converted to sinus rhythm 4/27/2024  Normal LV systolic function ejection fraction 55 to 60% no significant valvular abnormality  Severe hypokalemia and hypomagnesemia on admission  Prolonged QT interval secondary to low potassium and magnesium  History of alcoholic liver cirrhosis, status post TIPS(transjugular intrahepatic portosystemic shunt)  History of esophageal adenocarcinoma status post radiation therapy  Multiple abdominal surgeries  Previous history of GI bleed multiple times/gastroesophageal varices  Episodes of confusion, hallucination, agitation  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 
Date:   5/29/2024  Patient name: Frank Lisa  Date of admission:  5/23/2024 11:58 AM  MRN:   [id number]  YOB: 1960  PCP: Lopez Rosario PA    Reason for Admission: Syncope and collapse [R55]  Hypomagnesemia [E83.42]  Syncope, unspecified syncope type [R55]  Dizziness [R42]    Cardiology follow-up: Syncope, severe postural hypotension        Referring physician: Dr Alexia Tobar     Impression     Admission 5/23/2024 syncope status post micturation no focal neurodeficit , high-sensitivity troponin 22, serum magnesium 1.3 ECG showed sinus rhythm, T wave inversion V1 to V3, prolonged QT  Syncope -severe postural hypotension (on admission he was on metoprolol 50 twice daily, losartan 50 a day, Aldactone 25 a day, Lasix 20 mg a day)  Admission 4/23/2024 A-fib with RVR, new onset  Converted to sinus rhythm 4/27/2024  Normal LV systolic function ejection fraction 55 to 60% no significant valvular abnormality  Severe hypokalemia and hypomagnesemia on admission  Prolonged QT interval secondary to low potassium and magnesium  History of alcoholic liver cirrhosis, status post TIPS(transjugular intrahepatic portosystemic shunt)  History of esophageal adenocarcinoma status post radiation therapy  Multiple abdominal surgeries  Previous history of GI bleed multiple times/gastroesophageal varices  Episodes of confusion, hallucination, agitation  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 
Dr. Johnston (Neurology) notified of consult.  
Dr. Youngblood (Cardiology) notified of consult.  
Dr. Youngblood at bedside. Ordered to d/c lasix, aldactone, and decrease lopressor to 25mg. Ordered updated.   
Orders received from Dr. Youngblood to have Mag checked. Pt had replacement 5/24 w/ no recheck this morning. See orders for details   
Patient c/o dizziness and seeing things in the room. Patient thought he was in his camper. RN made Dr. Workman aware. Ammonia level ordered stat.  
Physical Therapy  Facility/Department: [email or web address] MED SURG  Daily Treatment Note  NAME: Frank Lisa  : 1960  MRN: [id number]    Date of Service: 2024    Discharge Recommendations:  Patient would benefit from continued therapy after discharge, Therapy recommended at discharge        Patient Diagnosis(es): The primary encounter diagnosis was Syncope, unspecified syncope type. A diagnosis of Hypomagnesemia was also pertinent to this visit.      Activity Tolerance: Other (comment) (encouragement needed for participation.)     Plan    Physical Therapy Plan  Specific Instructions for Next Treatment: advance gait distance using wheeled walker and progress to 2 steps w/ left rail, instruct in HEP  Current Treatment Recommendations: Strengthening;Balance training;Functional mobility training;Transfer training;Endurance training;Gait training;Stair training;Home exercise program;Safety education & training;Patient/Caregiver education & training;Equipment evaluation, education, & procurement;Therapeutic activities     Restrictions  Restrictions/Precautions  Restrictions/Precautions: Fall Risk  Required Braces or Orthoses?: No  Implants present? :  (denies)     Subjective    Pt resting in bed upon entering room.    Cognition  Overall Cognitive Status: Exceptions  Arousal/Alertness: Appears intact  Following Commands: Appears intact  Attention Span: Appears intact  Safety Judgement: Decreased awareness of need for assistance;Decreased awareness of need for safety  Problem Solving: Assistance required to generate solutions;Assistance required to implement solutions  Insights: Decreased awareness of deficits  Initiation: Appears intact  Sequencing: Appears intact     Objective   Bed Mobility Training  Supine to Sit: Independent  Sit to Supine: Independent  Scooting: Independent    Balance  Sitting: Intact    Transfer Training  Sit to Stand: Independent  Stand to Sit: Independent  Toilet Transfer: Independent (hygiene, 
Physical Therapy  Facility/Department: [facility] MED SURG  Daily Treatment Note  NAME: Frank Lisa  : 1960  MRN: [id number]    Date of Service: 2024    Discharge Recommendations:  Patient would benefit from continued therapy after discharge, Therapy recommended at discharge        Patient Diagnosis(es): The primary encounter diagnosis was Syncope, unspecified syncope type. A diagnosis of Hypomagnesemia was also pertinent to this visit.      Activity Tolerance: Patient tolerated treatment well     Plan    Physical Therapy Plan  Specific Instructions for Next Treatment: advance gait distance using wheeled walker and progress to 2 steps w/ left rail, instruct in HEP  Current Treatment Recommendations: Strengthening;Balance training;Functional mobility training;Transfer training;Endurance training;Gait training;Stair training;Home exercise program;Safety education & training;Patient/Caregiver education & training;Equipment evaluation, education, & procurement;Therapeutic activities     Restrictions  Restrictions/Precautions  Restrictions/Precautions: Fall Risk  Required Braces or Orthoses?: No  Implants present? :  (denies)     Subjective    Pt resting in bed upon entering room, patient states he had not gotten any sleep last night, very tired.    Pain: denies    Cognition  Overall Cognitive Status: Exceptions  Arousal/Alertness: Appears intact  Following Commands: Appears intact  Attention Span: Appears intact  Safety Judgement: Decreased awareness of need for assistance;Decreased awareness of need for safety  Problem Solving: Assistance required to generate solutions;Assistance required to implement solutions  Insights: Decreased awareness of deficits  Initiation: Appears intact  Sequencing: Appears intact     Objective      Bed Mobility Training  Rolling: Independent (right and left)  Supine to Sit: Supervision  Sit to Supine: Supervision    Balance  Sitting: Intact    Transfer Training  Sit to Stand: 
Physical Therapy  Facility/Department: [facility] PROGRESSIVE CARE  Physical Therapy Initial Assessment    Name: Frank Lisa  : 1960  MRN: [id number]  Date of Service: 2024    Discharge Recommendations:  Patient would benefit from continued therapy after discharge, Therapy recommended at discharge   PT Equipment Recommendations  Equipment Needed:  (TBD)      Patient Diagnosis(es): The primary encounter diagnosis was Syncope, unspecified syncope type. A diagnosis of Hypomagnesemia was also pertinent to this visit.  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), Alcoholic cirrhosis of liver with ascites (HCC), Anemia, Anxiety, Arthritis, Back pain, chronic, [place] esophagus, Bleeding gastric varices, BPH (benign prostatic hypertrophy), Cholelithiasis, Cirrhosis (HCC), COVID-19, COVID-19 vaccine series completed, DDD (degenerative disc disease), lumbar, Depression, Esophageal cancer (HCC), Esophageal varices (HCC), Fatty liver, GERD (gastroesophageal reflux disease), GI bleed, Heart murmur, Hep C w/o coma, chronic (HCC), Hiatal hernia, History of alcohol abuse, History of blood transfusion, History of colon polyps, History of tobacco abuse, [place] (hard of hearing), Hyperlipidemia, Hypertension, Hyponatremia, Hypotension, Lumbar radiculitis, Mastoid disorder, bilateral, Pericardial effusion, PONV (postoperative nausea and vomiting), Poor venous access, Port-A-Cath in place, Portal hypertension (HCC), SBO (small bowel obstruction) (HCC), Sciatica, Secondary esophageal varices (HCC), Shortness of breath, Sleep difficulties, Spinal stenosis, Stomach ulcer, Thrombocytopenia (HCC), Tubular adenoma of colon, Under care of team, Under care of team, Vitamin D deficiency, and Wears glasses.  Past Surgical History:  has a past surgical history that includes Bunionectomy; Nasal septum 
Pt arrived to 2082 via wheelchair from ED and was transferred to bed.  Vitals taken, telemetry applied. Admission and assessment complete. No distress noted.Call light within reach, and pt educated on its use. Bed in lowest position, and locked. Side rails up x 2.   
Esophageal varices (HCC), Fatty liver, GERD (gastroesophageal reflux disease), GI bleed, Heart murmur, Hep C w/o coma, chronic (HCC), Hiatal hernia, History of alcohol abuse, History of blood transfusion, History of colon polyps, History of tobacco abuse, [place] (hard of hearing), Hyperlipidemia, Hypertension, Hyponatremia, Hypotension, Lumbar radiculitis, Mastoid disorder, bilateral, Pericardial effusion, PONV (postoperative nausea and vomiting), Poor venous access, Port-A-Cath in place, Portal hypertension (HCC), SBO (small bowel obstruction) (HCC), Sciatica, Secondary esophageal varices (HCC), Shortness of breath, Sleep difficulties, Spinal stenosis, Stomach ulcer, Thrombocytopenia (HCC), Tubular adenoma of colon, Under care of team, Under care of team, Vitamin D deficiency, and Wears glasses.    Past Surgical History:   has a past surgical history that includes Bunionectomy; Nasal septum surgery; other surgical history (01/04/2016); Colonoscopy; Colonoscopy (10/05/2016); other surgical history (11/21/2016); other surgical history (12/19/2016); knee surgery (Left); Bunionectomy (Left); Endoscopy, colon, diagnostic; pr neuroplasty &/transpos median nrv carpal tunne (Right, 08/29/2017); Carpal tunnel release (Right); pr neuroplasty &/transpos median nrv carpal tunne (Left, 10/31/2017); Colonoscopy (N/A, 03/30/2018); Colonoscopy (03/30/2018); pr njx aa&/strd tfrml epi lumbar/sacral 1 level (Bilateral, 09/06/2018); other surgical history (09/28/2018); pr njx aa&/strd tfrml epi lumbar/sacral 1 level (N/A, 09/28/2018); Pain management procedure (Left, 07/09/2020); Pain management procedure (Left, 07/20/2020); Pain management procedure (Bilateral, 08/17/2020); other surgical history (Right, 11/23/2020); Nerve Block (Right, 11/23/2020); Pain management procedure (Bilateral, 12/07/2020); Upper gastrointestinal endoscopy (N/A, 12/29/2020); Upper gastrointestinal endoscopy (N/A, 02/02/2021); Upper gastrointestinal endoscopy 
assistance;Decreased awareness of need for safety  Problem Solving: Assistance required to generate solutions;Assistance required to implement solutions  Insights: Decreased awareness of deficits  Initiation: Appears intact  Sequencing: Appears intact    Activities of Daily Living  ADL  Feeding: Independent, Based on clinical judgement  Grooming: Stand by assistance, Based on clinical judgement  Grooming Skilled Clinical Factors: Washed his hands standing at sink  UE Bathing: Stand by assistance, Based on clinical judgement  LE Bathing: Stand by assistance, Based on clinical judgement  LE Bathing Skilled Clinical Factors: pt refused  UE Dressing: Setup  LE Dressing: Stand by assistance, Based on clinical judgement  LE Dressing Skilled Clinical Factors: pt refused  Toileting: Stand by assistance, Based on clinical judgement  Functional Mobility: Supervision  Functional Mobility Skilled Clinical Factors: tobias 3.5 min stand x 2 with supervision without UE support, able to complete squat exercise with good balance.  Additional Comments: ADL scores based on clinical reasoning unless  otherwise noted.  Pt is primarily limited due to decreased standing tolerance with BP dropping and impulsive behaviors affecting safety and independence  with self care        Transfers/Mobility  Bed mobility  Supine to Sit: Independent  Sit to Supine: Independent  Scooting: Independent  Transfers  Stand Step Transfers: Supervision  Stand Pivot Transfers: Supervision  Sit to stand: Independent  Stand to sit: Independent           OT Exercises  Dynamic Standing Balance Exercises: pt participated in 3 squat holds (7-10 seconds)  from standing then states : \"That's enough.\" supervision for balance without UE support.    Patient Education  Patient Education  Education Given To: Patient  Education Provided: Role of Therapy, Plan of Care, Precautions, Transfer Training  Education Provided Comments: benefits of participating in therapy, pt continued 
Plan  Times Per Week: 3-5  Current Treatment Recommendations: Strengthening, Balance training, Functional mobility training, Endurance training, Safety education & training, Patient/Caregiver education & training, Equipment evaluation, education, & procurement, Self-Care / ADL, Home management training, Positioning    Assessment  Activity Tolerance  Activity Tolerance: Patient Tolerated treatment well  Assessment  Performance deficits / Impairments: Decreased functional mobility , Decreased ADL status, Decreased safe awareness, Decreased endurance, Decreased balance, Decreased high-level IADLs  Assessment: pt lives alone and completes own advanced adls.  pt would benefit from inpatient therapy to progress with stand tolerance and balance to increase safety and decrease fall risk.  Treatment Diagnosis: Impaired self-care status  Prognosis: Good  Decision Making: Medium Complexity  Discharge Recommendations: Home with assist PRN  OT Equipment Recommendations  Other: TBD  Safety Devices  Type of Devices: All fall risk precautions in place, Call light within reach, Gait belt, Bed alarm in place, Left in bed    AM-[facility] Daily Activities Inpatient  AM-[facility] Daily Activity - Inpatient   How much help is needed for putting on and taking off regular lower body clothing?: A Little  How much help is needed for bathing (which includes washing, rinsing, drying)?: A Little  How much help is needed for toileting (which includes using toilet, bedpan, or urinal)?: A Little  How much help is needed for putting on and taking off regular upper body clothing?: A Little  How much help is needed for taking care of personal grooming?: A Little  How much help for eating meals?: None  AM-[facility] Inpatient Daily Activity Raw Score: 19  AM-[facility] Inpatient ADL T-Scale Score : 40.22  ADL Inpatient CMS 0-100% Score: 42.8  ADL Inpatient CMS G-Code Modifier : CK    OT Minutes  OT Individual Minutes  Time In: 1550  Time Out: 1616  Minutes: 
   UPPER GASTROINTESTINAL ENDOSCOPY N/A 08/02/2023    EGD WITH APC TO SMALL BOWEL AVM AND RESOLUTION CLIP APPLIED TO GE JUNCTION performed by Dixon Olivia MD at [facility] ENDO    UPPER GASTROINTESTINAL ENDOSCOPY N/A 1/9/2024    EGD BIOPSY ESOPHAGOGASTRODUODENOSCOPY PEG TUBE INSERTION performed by Kole Guo MD at [facility] ENDO    UPPER GASTROINTESTINAL ENDOSCOPY N/A 5/10/2024    EGD BIOPSY WITH REMOVAL PERCUTANEOUS ENDOSCOPIC GASTROSTOMY TUBE performed by Kole Guo MD at [facility] ENDO    WISDOM TOOTH EXTRACTION          Medications Prior to Admission:     Prior to Admission medications    Medication Sig Start Date End Date Taking? Authorizing Provider   nadolol (CORGARD) 20 MG tablet Take 1 tablet by mouth daily  Patient not taking: Reported on 5/23/2024    Dania Peoples MD   sucralfate (CARAFATE) 1 GM tablet Take 1 tablet by mouth 4 times daily  Patient not taking: Reported on 5/23/2024    Dania Peoples MD   furosemide (LASIX) 20 MG tablet Take 1 tablet by mouth daily  Patient not taking: Reported on 5/23/2024    Dania Peoples MD   ferrous sulfate (IRON 325) 325 (65 Fe) MG tablet Take 1 tablet by mouth in the morning and at bedtime    Dania Peoples MD   lactulose (CHRONULAC) 10 GM/15ML solution Take 45 mLs by mouth 3 times daily Hold for > 3 bms/day 5/14/24   Lopez Rosario PA   Handicap Placard MISC by Does not apply route Exp: 5-14-26 5/14/24   Lopez Rosario PA   potassium chloride (K-TAB) 20 MEQ TBCR extended release tablet Take 1 tablet by mouth daily as needed  Patient not taking: Reported on 5/23/2024    Dania Peoples MD   pantoprazole (PROTONIX) 40 MG tablet Take 1 tablet by mouth daily 5/10/24   Kole Guo MD   losartan (COZAAR) 50 MG tablet Take 1 tablet by mouth daily 5/3/24   Roberto Workman MD   metoprolol tartrate (LOPRESSOR) 50 MG tablet Take 1 tablet by mouth 2 times daily 5/2/24   Roberto Workman MD   spironolactone (ALDACTONE) 25 MG tablet Take 1 tablet by 
    Family History   Problem Relation Age of Onset    Cancer Mother         pancreatic    Cancer Father         bone    Diabetes Sister     Asthma Brother     Allergies Brother         MULTIPLE       Review of Systems:     Positive and Negative as described in HPI.    CONSTITUTIONAL:  negative for fevers, chills, sweats, fatigue, weight loss  HEENT:  negative for vision, hearing changes, runny nose, throat pain  RESPIRATORY:  negative for shortness of breath, cough, congestion, wheezing  CARDIOVASCULAR:  negative for chest pain, palpitations  GASTROINTESTINAL:  negative for nausea, vomiting, diarrhea, constipation, change in bowel habits, abdominal pain   GENITOURINARY:  negative for difficulty of urination, burning with urination, frequency   INTEGUMENT:  negative for rash, skin lesions, easy bruising   HEMATOLOGIC/LYMPHATIC:  negative for swelling/edema   ALLERGIC/IMMUNOLOGIC:  negative for urticaria , itching  ENDOCRINE:  negative increase in drinking, increase in urination, hot or cold intolerance  MUSCULOSKELETAL:  negative joint pains, muscle aches, swelling of joints  NEUROLOGICAL:  negative for headaches, dizziness, lightheadedness, numbness, pain, tingling extremities  BEHAVIOR/PSYCH:  negative for depression, anxiety    Physical Exam:   /80   Pulse 60   Temp 98.1 °F (36.7 °C) (Oral)   Resp 18   Ht 1.778 m (5' 10\")   Wt 80.5 kg (177 lb 7.5 oz)   SpO2 97%   BMI 25.46 kg/m²   Temp (24hrs), Av.3 °F (36.8 °C), Min:98 °F (36.7 °C), Max:98.7 °F (37.1 °C)    No results for input(s): \"POCGLU\" in the last 72 hours.    Intake/Output Summary (Last 24 hours) at 2024 1013  Last data filed at 2024 0636  Gross per 24 hour   Intake 600 ml   Output 1100 ml   Net -500 ml       General Appearance: alert, well appearing, and in no acute distress  Mental status: oriented to person, place, and time  Head: normocephalic, atraumatic  Eye: no icterus, redness, pupils equal and reactive, extraocular eye 
pressure  Keep patient well-hydrated  Okay to discharge from cardiac point of view  Electronically signed by Stella Youngblood MD on 5/27/2024 at 9:22 AM  
   Magnesium 1.3 (L) 1.6 - 2.6 mg/dL   Magnesium    Collection Time: 05/25/24  7:50 AM   Result Value Ref Range    Magnesium 2.0 1.6 - 2.6 mg/dL       Imaging/Diagnostics:  CT HEAD WO CONTRAST    Result Date: 5/23/2024  No acute intracranial abnormality. No evidence of acute cervical spine fracture .     CT CERVICAL SPINE WO CONTRAST    Result Date: 5/23/2024  No acute intracranial abnormality. No evidence of acute cervical spine fracture .     XR CHEST PORTABLE    Result Date: 5/23/2024  No acute process. Stable cardiomegaly       Assessment :      Hospital Problems             Last Modified POA    * (Principal) Syncope 5/24/2024 Yes    Ascites due to alcoholic cirrhosis (HCC) 5/23/2024 Yes    S/P TIPS (transjugular intrahepatic portosystemic shunt) 5/23/2024 Yes    Chronic hepatitis C without hepatic coma (HCC) 5/23/2024 Yes    GERD (gastroesophageal reflux disease) 5/23/2024 Yes    Pure hypercholesterolemia 5/23/2024 Yes    Essential hypertension 5/23/2024 Yes    Multifactorial gait disorder 5/24/2024 Yes    Former smoker, 50+ pack years, quit 2016 (Chronic) 5/23/2024 Yes    Other microscopic hematuria 5/23/2024 Yes       Plan:     Patient status inpatient in the Progressive Unit/Step down    1.  63-year-old gentleman with underlying multiple comorbid conditions admitted with syncope and collapse, will do the orthostatics, possible hypotension, patient did not have any postictal confusion, on multiple blood pressure medications, will review, blood pressure running on the high side at this time, continue, get neurology and cardiology on board, troponin mildly elevated, no chest pain no shortness of breath,  Alcoholic liver cirrhosis, status post TIPS in the past, mentation normal, not significant ascites  Former smoker and COPD, continue inhalers,  Being worked up for connective tissue disorder as outpatient with rheumatology,  DVT prophylaxis with SCDs and Lovenox, INR 1.2, this patient has underlying advanced 
ensure the accuracy of this automated transcription, some errors in transcription may have occurred.

## 2024-05-30 NOTE — DISCHARGE INSTRUCTIONS
Your information:  Name: Frank Lisa  : 1960    Your instructions:    Contact your physician if you have any questions or concerns.    What to do after you leave the hospital:    Recommended diet: regular diet, low fat, low cholesterol diet, encourage fluids, and high fiber, no added salt    Recommended activity: activity as tolerated    If any problems occur once I leave the hospital I am to contact my physician or go to the emergency room.

## 2024-05-31 ENCOUNTER — HOSPITAL ENCOUNTER (OUTPATIENT)
Dept: PHARMACY | Age: 64
Setting detail: THERAPIES SERIES
Discharge: HOME | End: 2024-05-31
Payer: COMMERCIAL

## 2024-05-31 ENCOUNTER — TELEPHONE (OUTPATIENT)
Dept: PRIMARY CARE CLINIC | Age: 64
End: 2024-05-31

## 2024-05-31 ENCOUNTER — CASE MANAGEMENT (OUTPATIENT)
Dept: SPIRITUAL SERVICES | Age: 64
End: 2024-05-31

## 2024-05-31 PROCEDURE — 99212 OFFICE O/P EST SF 10 MIN: CPT | Performed by: PHARMACY

## 2024-05-31 RX ORDER — LANOLIN ALCOHOL/MO/W.PET/CERES
400 CREAM (GRAM) TOPICAL DAILY
COMMUNITY

## 2024-05-31 RX ORDER — ONDANSETRON 4 MG/1
4 TABLET, FILM COATED ORAL EVERY 8 HOURS PRN
COMMUNITY

## 2024-05-31 NOTE — ACP (ADVANCE CARE PLANNING)
Advance Care Planning     Advance Care Planning Clinical Specialist  Conversation Note      Date of ACP Conversation: 5/31/2024    Conversation Conducted with: Patient with Decision Making Capacity and Healthcare Decision Maker    ACP Clinical Specialist: LYNDSEY Mcfarland      Current Designated Healthcare Decision Maker:     Primary Decision Maker: Maria LNilam - Ex-Spouse - [phone]    Ventilation:  \"If you were in your present state of health and suddenly became very ill and were unable to breathe on your own, what would your preference be about the use of a ventilator (breathing machine) if it were available to you?\"      If the patient would desire the use of ventilator (breathing machine), answer \"yes\".  If not, \"no\":  Patient deferred question to his ex-spouse and did not want to answer question    \"If your health worsens and it becomes clear that your chance of recovery is unlikely, what would your preference be about the use of a ventilator (breathing machine) if it were available to you?\"     Would the patient desire the use of ventilator (breathing machine)?: No      Resuscitation  \"CPR works best to restart the heart when there is a sudden event, like a heart attack, in someone who is otherwise healthy. Unfortunately, CPR does not typically restart the heart for people who have serious health conditions or who are very sick.\"    \"In the event your heart stopped as a result of an underlying serious health condition, would you want attempts to be made to restart your heart (answer \"yes\" for attempt to resuscitate) or would you prefer a natural death (answer \"no\" for do not attempt to resuscitate)?\" yes       [x] Yes   [] No   Educated Patient / Decision Maker regarding differences between Advance Directives and portable DNR orders.    Length of ACP Conversation in minutes:  25    Conversation Outcomes:  ACP discussion completed    Follow-up plan:    [] Schedule follow-up conversation to continue  no

## 2024-05-31 NOTE — PROGRESS NOTES
Lopez Rosario PA, medication review completed with patient:  - Frank Lisa    Patient has appointment with you still to be scheduled. Please discuss with patient at appointment or route back to me to discuss with patient.    See note below for complete details.     Thank you,  Aide Figueroa [facility], PharmD, BCACP  5/31/2024  2:58 PM  [facility] ([place])    =======================================================    CLINICAL PHARMACY NOTE - Medication Review  Patient outreach to review medications - Spoke with patient and ex-spouse (Nilam) .      SUBJECTIVE/OBJECTIVE:   Frank Lisa is a 63 y.o. male referred to a clinical pharmacy specialist by care coordination provider and/or given their history of recent hospitalization.    Medications:  Current Outpatient Medications   Medication Sig Dispense Refill    midodrine (PROAMATINE) 2.5 MG tablet Take 1 tablet by mouth 3 times daily (with meals) 90 tablet 3    lactulose (CHRONULAC) 10 GM/15ML solution Take 45 mLs by mouth 3 times daily 946 mL 1    metoprolol tartrate (LOPRESSOR) 25 MG tablet Take 0.5 tablets by mouth 2 times daily 60 tablet 3    ferrous sulfate (IRON 325) 325 (65 Fe) MG tablet Take 1 tablet by mouth in the morning and at bedtime      Handicap Placard MISC by Does not apply route Exp: 5-14-26 1 each 0    pantoprazole (PROTONIX) 40 MG tablet Take 1 tablet by mouth daily 60 tablet 1    folic acid (FOLVITE) 1 MG tablet Take 1 tablet by mouth daily 30 tablet 3    DULoxetine (CYMBALTA) 30 MG extended release capsule Take 1 capsule by mouth daily 30 capsule 3     No current facility-administered medications for this encounter.       Additional Medications (including OTC/Herbal Supplements):  Patient takes magnesium and Zofran, which were not on medication list. Writer added these medications to medication list.    Allergies:   Allergies   Allergen Reactions    Pollen Extract Other (See Comments)     Runny nose, watery eyes

## 2024-05-31 NOTE — TELEPHONE ENCOUNTER
[facility] Transitions Initial Follow Up Call    Outreach made within 2 business days of discharge: Yes    Patient: Frank Lisa Patient : 1960   MRN: [id number]  Reason for Admission: Syncope  Discharge Date: 24       Spoke with: Left voicemail    Discharge department/facility: [facility] Interactive Patient Contact:  Was patient able to fill all prescriptions:    Was patient instructed to bring all medications to the follow-up visit:   Is patient taking all medications as directed in the discharge summary?   Does patient understand their discharge instructions:   Does patient have questions or concerns that need addressed prior to 7-14 day follow up office visit:     Scheduled appointment with PCP within 7-14 days    Follow Up  Future Appointments   Date Time Provider Department Center   2024  2:30 PM MARA MEDICATION [facility]       Rosa Maria Mortensen MA

## 2024-05-31 NOTE — TELEPHONE ENCOUNTER
Care Transitions Initial Follow Up Call    Outreach made within 2 business days of discharge: No    Patient: Frank Lisa Patient : 1960   MRN: [id number]  Reason for Admission: There are no discharge diagnoses documented for the most recent discharge.  Discharge Date: 24       Spoke with: yong lisa    Discharge department/facility: [facility] Interactive Patient Contact:  Was patient able to fill all prescriptions: Yes  Was patient instructed to bring all medications to the follow-up visit: Yes  Is patient taking all medications as directed in the discharge summary? Yes  Does patient understand their discharge instructions: Yes  Does patient have questions or concerns that need addressed prior to 7-14 day follow up office visit: no    Scheduled appointment with PCP within 7-14 days    Follow Up  Future Appointments   Date Time Provider Department Center   2024  1:00 PM Edinson Griffith MD [facility]       ILSET RIVERA MA

## 2024-05-31 NOTE — DISCHARGE INSTRUCTIONS
Bring updated medication list with you to every appointment and discard of any medications you are not supposed to take anymore. Many pharmacies and police stations have drug drop-off boxes to get rid of unused, , and unneeded medications. Ask your doctor about ondansetron (Zofran) and magnesium.

## 2024-06-01 ENCOUNTER — HOSPITAL ENCOUNTER (EMERGENCY)
Age: 64
Discharge: HOME | End: 2024-06-01
Attending: EMERGENCY MEDICINE
Payer: COMMERCIAL

## 2024-06-01 ENCOUNTER — APPOINTMENT (OUTPATIENT)
Dept: GENERAL RADIOLOGY | Age: 64
End: 2024-06-01
Payer: COMMERCIAL

## 2024-06-01 ENCOUNTER — APPOINTMENT (OUTPATIENT)
Dept: CT IMAGING | Age: 64
End: 2024-06-01
Payer: COMMERCIAL

## 2024-06-01 VITALS
HEART RATE: 70 BPM | WEIGHT: 184 LBS | BODY MASS INDEX: 26.34 KG/M2 | DIASTOLIC BLOOD PRESSURE: 65 MMHG | TEMPERATURE: 98.4 F | HEIGHT: 70 IN | RESPIRATION RATE: 15 BRPM | OXYGEN SATURATION: 98 % | SYSTOLIC BLOOD PRESSURE: 133 MMHG

## 2024-06-01 DIAGNOSIS — S20.212A CONTUSION OF RIB ON LEFT SIDE, INITIAL ENCOUNTER: ICD-10-CM

## 2024-06-01 DIAGNOSIS — E83.42 HYPOMAGNESEMIA: Primary | ICD-10-CM
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
## 2024-06-01 LAB
ALBUMIN SERPL-MCNC: 2.9 G/DL (ref 3.5–5.2)
ALP SERPL-CCNC: 133 U/L (ref 40–129)
ALT SERPL-CCNC: 13 U/L (ref 5–41)
ANION GAP SERPL CALCULATED.3IONS-SCNC: 12 MMOL/L (ref 9–17)
AST SERPL-CCNC: 48 U/L
ATYPICAL LYMPHOCYTE ABSOLUTE COUNT: 0.07 K/UL
ATYPICAL LYMPHOCYTES: 1 %
BASOPHILS # BLD: 0 K/UL (ref 0–0.2)
BASOPHILS NFR BLD: 0 % (ref 0–2)
BILIRUB SERPL-MCNC: 2 MG/DL (ref 0.3–1.2)
BUN SERPL-MCNC: 11 MG/DL (ref 8–23)
CALCIUM SERPL-MCNC: 8.6 MG/DL (ref 8.6–10.4)
CHLORIDE SERPL-SCNC: 100 MMOL/L (ref 98–107)
CO2 SERPL-SCNC: 20 MMOL/L (ref 20–31)
CREAT SERPL-MCNC: 0.8 MG/DL (ref 0.7–1.2)
EKG ATRIAL RATE: 72 BPM
EKG P AXIS: 50 DEGREES
EKG P-R INTERVAL: 130 MS
EKG Q-T INTERVAL: 412 MS
EKG QRS DURATION: 76 MS
EKG QTC CALCULATION (BAZETT): 451 MS
EKG R AXIS: 55 DEGREES
EKG T AXIS: 36 DEGREES
EKG VENTRICULAR RATE: 72 BPM
EOSINOPHIL # BLD: 0 K/UL (ref 0–0.4)
EOSINOPHILS RELATIVE PERCENT: 0 % (ref 0–4)
ERYTHROCYTE [DISTWIDTH] IN BLOOD BY AUTOMATED COUNT: 15.6 % (ref 11.5–14.9)
GFR, ESTIMATED: >90 ML/MIN/1.73M2
GLUCOSE SERPL-MCNC: 95 MG/DL (ref 70–99)
HCT VFR BLD AUTO: 29.8 % (ref 41–53)
HGB BLD-MCNC: 9.8 G/DL (ref 13.5–17.5)
LYMPHOCYTES NFR BLD: 0.39 K/UL (ref 1–4.8)
LYMPHOCYTES RELATIVE PERCENT: 6 % (ref 24–44)
MAGNESIUM SERPL-MCNC: 1.4 MG/DL (ref 1.6–2.6)
MCH RBC QN AUTO: 31 PG (ref 26–34)
MCHC RBC AUTO-ENTMCNC: 33 G/DL (ref 31–37)
MCV RBC AUTO: 93.9 FL (ref 80–100)
MONOCYTES NFR BLD: 0.98 K/UL (ref 0.1–1.3)
MONOCYTES NFR BLD: 15 % (ref 1–7)
MORPHOLOGY: ABNORMAL
MORPHOLOGY: ABNORMAL
NEUTROPHILS NFR BLD: 78 % (ref 36–66)
NEUTS SEG NFR BLD: 5.06 K/UL (ref 1.3–9.1)
PLATELET # BLD AUTO: 143 K/UL (ref 150–450)
PMV BLD AUTO: 7.8 FL (ref 6–12)
POTASSIUM SERPL-SCNC: 3.9 MMOL/L (ref 3.7–5.3)
PROT SERPL-MCNC: 6 G/DL (ref 6.4–8.3)
RBC # BLD AUTO: 3.18 M/UL (ref 4.5–5.9)
SODIUM SERPL-SCNC: 132 MMOL/L (ref 135–144)
TROPONIN I SERPL HS-MCNC: 24 NG/L (ref 0–22)
WBC OTHER # BLD: 6.5 K/UL (ref 3.5–11)

## 2024-06-01 PROCEDURE — 96365 THER/PROPH/DIAG IV INF INIT: CPT

## 2024-06-01 PROCEDURE — 80053 COMPREHEN METABOLIC PANEL: CPT

## 2024-06-01 PROCEDURE — 6370000000 HC RX 637 (ALT 250 FOR IP): Performed by: EMERGENCY MEDICINE

## 2024-06-01 PROCEDURE — 85025 COMPLETE CBC W/AUTO DIFF WBC: CPT

## 2024-06-01 PROCEDURE — 71101 X-RAY EXAM UNILAT RIBS/CHEST: CPT

## 2024-06-01 PROCEDURE — 84484 ASSAY OF TROPONIN QUANT: CPT

## 2024-06-01 PROCEDURE — 2500000003 HC RX 250 WO HCPCS: Performed by: EMERGENCY MEDICINE

## 2024-06-01 PROCEDURE — 99285 EMERGENCY DEPT VISIT HI MDM: CPT

## 2024-06-01 PROCEDURE — 70450 CT HEAD/BRAIN W/O DYE: CPT

## 2024-06-01 PROCEDURE — 2580000003 HC RX 258: Performed by: EMERGENCY MEDICINE

## 2024-06-01 PROCEDURE — 96366 THER/PROPH/DIAG IV INF ADDON: CPT

## 2024-06-01 PROCEDURE — 83735 ASSAY OF MAGNESIUM: CPT

## 2024-06-01 PROCEDURE — 36415 COLL VENOUS BLD VENIPUNCTURE: CPT

## 2024-06-01 RX ORDER — MAGNESIUM SULFATE HEPTAHYDRATE 40 MG/ML
2000 INJECTION, SOLUTION INTRAVENOUS ONCE
Status: COMPLETED | OUTPATIENT
Start: 2024-06-01 | End: 2024-06-01

## 2024-06-01 RX ORDER — OXYCODONE HYDROCHLORIDE AND ACETAMINOPHEN 5; 325 MG/1; MG/1
2 TABLET ORAL ONCE
Status: COMPLETED | OUTPATIENT
Start: 2024-06-01 | End: 2024-06-01

## 2024-06-01 RX ORDER — MAGNESIUM OXIDE 400 MG/1
400 TABLET ORAL 2 TIMES DAILY
Qty: 30 TABLET | Refills: 0 | Status: SHIPPED | OUTPATIENT
Start: 2024-06-01

## 2024-06-01 RX ORDER — 0.9 % SODIUM CHLORIDE 0.9 %
1000 INTRAVENOUS SOLUTION INTRAVENOUS ONCE
Status: COMPLETED | OUTPATIENT
Start: 2024-06-01 | End: 2024-06-01

## 2024-06-01 RX ADMIN — OXYCODONE HYDROCHLORIDE AND ACETAMINOPHEN 2 TABLET: 5; 325 TABLET ORAL at 13:12

## 2024-06-01 RX ADMIN — MAGNESIUM SULFATE HEPTAHYDRATE 2000 MG: 40 INJECTION, SOLUTION INTRAVENOUS at 14:50

## 2024-06-01 RX ADMIN — SODIUM CHLORIDE 1000 ML: 9 INJECTION, SOLUTION INTRAVENOUS at 14:54

## 2024-06-01 ASSESSMENT — PAIN SCALES - GENERAL: PAINLEVEL_OUTOF10: 9

## 2024-06-01 ASSESSMENT — PAIN - FUNCTIONAL ASSESSMENT: PAIN_FUNCTIONAL_ASSESSMENT: 0-10

## 2024-06-01 NOTE — ED PROVIDER NOTES
[facility] ED  EMERGENCY DEPARTMENT ENCOUNTER      Pt Name: Frank Lisa  MRN: [id number]  Birthdate 1960  Date of evaluation: 6/1/24      CHIEF COMPLAINT       Chief Complaint   Patient presents with    Palpitations    Fatigue    Fall     Falls x 3 today    Dizziness    Headache     HISTORY OF PRESENT ILLNESS   HPI 63 y.o. male presents with c/o falling and unsteadiness.  Pt reports that he had 3 falls today.  He states that he was in the bathroom and he lost his balance and fell onto his left side hitting his ribs.   He says that he fell two other times he fell onto his buttock.  No loc.   He reports pain in his left ribs. He states he didn't hit his head but he has been hurting since his fall about a week ago. Up all night, sleeps during day, not eating regularly.  The patient was recently admitted to [facility] between the 23rd and 30 May, he was just discharged 2 days ago, at that time he had been evaluated for a syncopal episode.  His blood pressure medications were adjusted.  He had an echocardiogram performed on 25 April that showed a normal ejection fraction, no significant valvular abnormalities.    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 Mc, orthopedic, every 3-4 months, gets steroid injection    Lezama esophagus     Bleeding gastric varices 12/29/2022    BPH (benign prostatic hypertrophy)     Cholelithiasis     Cirrhosis (HCC)     COVID-19 12/2020    pt reports he had a positive test while at [facility]  shows a sinus rhythm with a heart rate 72  QRS of 76 QTc of 451 no ST segment elevations or some T wave inversions the axis is normal    DATA FOR LAB AND RADIOLOGY TESTS ORDERED BELOW ARE REVIEWED BY THE ED CLINICIAN:    RADIOLOGY: All x-rays, CT, MRI, and formal ultrasound images (except ED bedside ultrasound) are read by the radiologist, see reports below, unless otherwise noted in MDM or here.  Reports below are reviewed by myself.  XR RIBS LEFT INCLUDE CHEST (MIN 3 VIEWS)   Final Result   Old left-sided rib fractures, without a definite acute rib fracture seen   radiographically.      Cardiomegaly.  Prominence of the pulmonary vasculature could represent   pulmonary vascular congestion.         CT HEAD WO CONTRAST   Final Result   1. No acute intracranial abnormality.   2. Generalized cerebral volume loss and chronic microvascular ischemic   changes.   3. Partial opacification of the left mastoid air cells.             LABS: Lab orders shown below, the results are reviewed by myself, and all abnormals are listed below.  Labs Reviewed   CBC WITH AUTO DIFFERENTIAL - Abnormal; Notable for the following components:       Result Value    RBC 3.18 (*)     Hemoglobin 9.8 (*)     Hematocrit 29.8 (*)     RDW 15.6 (*)     Platelets 143 (*)     Neutrophils % 78 (*)     Lymphocytes % 6 (*)     Monocytes % 15 (*)     Lymphocytes Absolute 0.39 (*)     All other components within normal limits   COMPREHENSIVE METABOLIC PANEL - Abnormal; Notable for the following components:    Sodium 132 (*)     Total Protein 6.0 (*)     Albumin 2.9 (*)     Total Bilirubin 2.0 (*)     Alkaline Phosphatase 133 (*)     AST 48 (*)     All other components within normal limits   TROPONIN - Abnormal; Notable for the following components:    Troponin, High Sensitivity 24 (*)     All other components within normal limits   MAGNESIUM - Abnormal; Notable for the following components:    Magnesium 1.4 (*)     All other components within normal

## 2024-06-04 LAB
EKG ATRIAL RATE: 72 BPM
EKG P AXIS: 50 DEGREES
EKG P-R INTERVAL: 130 MS
EKG Q-T INTERVAL: 412 MS
EKG QRS DURATION: 76 MS
EKG QTC CALCULATION (BAZETT): 451 MS
EKG R AXIS: 55 DEGREES
EKG T AXIS: 36 DEGREES
EKG VENTRICULAR RATE: 72 BPM

## 2024-06-07 ENCOUNTER — TELEPHONE (OUTPATIENT)
Dept: UROLOGY | Age: 64
End: 2024-06-07

## 2024-06-07 ENCOUNTER — HOSPITAL ENCOUNTER (OUTPATIENT)
Age: 64
Setting detail: SPECIMEN
Discharge: HOME | End: 2024-06-07

## 2024-06-07 ENCOUNTER — OFFICE VISIT (OUTPATIENT)
Dept: UROLOGY | Age: 64
End: 2024-06-07
Payer: COMMERCIAL

## 2024-06-07 VITALS
HEART RATE: 77 BPM | BODY MASS INDEX: 26.34 KG/M2 | OXYGEN SATURATION: 95 % | WEIGHT: 184 LBS | DIASTOLIC BLOOD PRESSURE: 88 MMHG | HEIGHT: 70 IN | SYSTOLIC BLOOD PRESSURE: 138 MMHG | TEMPERATURE: 98 F

## 2024-06-07 DIAGNOSIS — R31.29 MICROHEMATURIA: ICD-10-CM

## 2024-06-07 DIAGNOSIS — R31.29 MICROHEMATURIA: Primary | ICD-10-CM

## 2024-06-07 DIAGNOSIS — Z12.5 PROSTATE CANCER SCREENING: ICD-10-CM

## 2024-06-07 PROCEDURE — 3079F DIAST BP 80-89 MM HG: CPT | Performed by: UROLOGY

## 2024-06-07 PROCEDURE — 3075F SYST BP GE 130 - 139MM HG: CPT | Performed by: UROLOGY

## 2024-06-07 PROCEDURE — 99204 OFFICE O/P NEW MOD 45 MIN: CPT | Performed by: UROLOGY

## 2024-06-07 NOTE — TELEPHONE ENCOUNTER
Cysto -MAC- @ [facility] 7/1/24 1:15pm   PAT :phone call 6/17/24 11:00am         Spoke with patient, procedure info given to patient.

## 2024-06-07 NOTE — PROGRESS NOTES
[facility], [facility]  [address]  [facility] [address]  Dept: [phone]    [facility] Office Note - Established    Patient:  Frank Lisa  YOB: 1960  Date: 6/7/2024    The patient is a 63 y.o. male who presents todayfor evaluation of the following problems:   Chief Complaint   Patient presents with    hospital follow-up microhematuria.       HPI  He is here for microhematuria.   He has never seen blood.   He is a former smoker.   He smoked nearly 45 years.   U/A showed blood, including micro.       Summary of old records: N/A    Additional History: N/A    Procedures Today: N/A    Urinalysis today:  No results found for this visit on 06/07/24.  Last several PSA's:  Lab Results   Component Value Date    PSA 0.11 08/19/2021    PSA 0.44 03/23/2017    PSA 0.54 04/19/2012     Last total testosterone:  Lab Results   Component Value Date    TESTOSTERONE 357 07/03/2020       AUA Symptom Score (6/7/2024):                               Last BUN and creatinine:  Lab Results   Component Value Date    BUN 11 06/01/2024     Lab Results   Component Value Date    CREATININE 0.8 06/01/2024       Additional Lab/Culture results: U/A with micro negative.     Imaging Reviewed during this Office Visit: CT images reviewed and negative.     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 Mc, orthopedic, every 3-4 months, gets steroid injection    Lezama esophagus

## 2024-06-08 LAB
MICROORGANISM SPEC CULT: NO GROWTH
SERVICE CMNT-IMP: NORMAL
SPECIMEN DESCRIPTION: NORMAL

## 2024-06-10 ENCOUNTER — HOSPITAL ENCOUNTER (OUTPATIENT)
Age: 64
Discharge: HOME | End: 2024-06-10
Payer: COMMERCIAL

## 2024-06-10 DIAGNOSIS — K70.31 ASCITES DUE TO ALCOHOLIC CIRRHOSIS (HCC): ICD-10-CM

## 2024-06-10 DIAGNOSIS — Z87.19 HISTORY OF MELENA: ICD-10-CM

## 2024-06-10 DIAGNOSIS — K70.31 ALCOHOLIC CIRRHOSIS OF LIVER WITH ASCITES (HCC): ICD-10-CM

## 2024-06-10 DIAGNOSIS — K70.31 ALCOHOLIC CIRRHOSIS OF LIVER WITH ASCITES (HCC): Primary | ICD-10-CM

## 2024-06-10 DIAGNOSIS — Z12.5 PROSTATE CANCER SCREENING: ICD-10-CM

## 2024-06-10 DIAGNOSIS — K76.6 PORTAL HYPERTENSION (HCC): ICD-10-CM

## 2024-06-10 LAB
ALBUMIN SERPL-MCNC: 2.7 G/DL (ref 3.5–5.2)
ALP SERPL-CCNC: 169 U/L (ref 40–129)
ALT SERPL-CCNC: 10 U/L (ref 5–41)
ANION GAP SERPL CALCULATED.3IONS-SCNC: 11 MMOL/L (ref 9–17)
AST SERPL-CCNC: 38 U/L
BASOPHILS # BLD: 0 K/UL (ref 0–0.2)
BASOPHILS NFR BLD: 1 % (ref 0–2)
BILIRUB SERPL-MCNC: 2.4 MG/DL (ref 0.3–1.2)
BUN SERPL-MCNC: 8 MG/DL (ref 8–23)
CALCIUM SERPL-MCNC: 8.7 MG/DL (ref 8.6–10.4)
CHLORIDE SERPL-SCNC: 100 MMOL/L (ref 98–107)
CO2 SERPL-SCNC: 26 MMOL/L (ref 20–31)
CREAT SERPL-MCNC: 0.7 MG/DL (ref 0.7–1.2)
EOSINOPHIL # BLD: 0.2 K/UL (ref 0–0.4)
EOSINOPHILS RELATIVE PERCENT: 3 % (ref 0–4)
ERYTHROCYTE [DISTWIDTH] IN BLOOD BY AUTOMATED COUNT: 15.1 % (ref 11.5–14.9)
GFR, ESTIMATED: >90 ML/MIN/1.73M2
GLUCOSE SERPL-MCNC: 110 MG/DL (ref 70–99)
HCT VFR BLD AUTO: 32 % (ref 41–53)
HGB BLD-MCNC: 10.6 G/DL (ref 13.5–17.5)
LYMPHOCYTES NFR BLD: 0.7 K/UL (ref 1–4.8)
LYMPHOCYTES RELATIVE PERCENT: 12 % (ref 24–44)
MCH RBC QN AUTO: 31 PG (ref 26–34)
MCHC RBC AUTO-ENTMCNC: 33.2 G/DL (ref 31–37)
MCV RBC AUTO: 93.2 FL (ref 80–100)
MONOCYTES NFR BLD: 0.9 K/UL (ref 0.1–1.3)
MONOCYTES NFR BLD: 14 % (ref 1–7)
NEUTROPHILS NFR BLD: 70 % (ref 36–66)
NEUTS SEG NFR BLD: 4.3 K/UL (ref 1.3–9.1)
PLATELET # BLD AUTO: 162 K/UL (ref 150–450)
PMV BLD AUTO: 7.2 FL (ref 6–12)
POTASSIUM SERPL-SCNC: 4.2 MMOL/L (ref 3.7–5.3)
PROT SERPL-MCNC: 6.2 G/DL (ref 6.4–8.3)
PSA SERPL-MCNC: 0.2 NG/ML (ref 0–4)
RBC # BLD AUTO: 3.44 M/UL (ref 4.5–5.9)
SODIUM SERPL-SCNC: 137 MMOL/L (ref 135–144)
WBC OTHER # BLD: 6.1 K/UL (ref 3.5–11)

## 2024-06-10 PROCEDURE — 85025 COMPLETE CBC W/AUTO DIFF WBC: CPT

## 2024-06-10 PROCEDURE — 36415 COLL VENOUS BLD VENIPUNCTURE: CPT

## 2024-06-10 PROCEDURE — G0103 PSA SCREENING: HCPCS

## 2024-06-10 PROCEDURE — 80053 COMPREHEN METABOLIC PANEL: CPT

## 2024-06-13 ENCOUNTER — OFFICE VISIT (OUTPATIENT)
Dept: PRIMARY CARE CLINIC | Age: 64
End: 2024-06-13

## 2024-06-13 VITALS
OXYGEN SATURATION: 97 % | BODY MASS INDEX: 24.85 KG/M2 | DIASTOLIC BLOOD PRESSURE: 62 MMHG | SYSTOLIC BLOOD PRESSURE: 108 MMHG | WEIGHT: 173.6 LBS | HEIGHT: 70 IN | HEART RATE: 88 BPM

## 2024-06-13 DIAGNOSIS — I95.9 HYPOTENSION, UNSPECIFIED HYPOTENSION TYPE: ICD-10-CM

## 2024-06-13 DIAGNOSIS — I95.1 ORTHOSTATIC HYPOTENSION: ICD-10-CM

## 2024-06-13 DIAGNOSIS — Z09 HOSPITAL DISCHARGE FOLLOW-UP: Primary | ICD-10-CM

## 2024-06-13 DIAGNOSIS — I10 ESSENTIAL HYPERTENSION: ICD-10-CM

## 2024-06-13 NOTE — PROGRESS NOTES
Post-Discharge Transitional Care  Follow Up      Frank Lisa   YOB: 1960    Date of Office Visit:  6/13/2024  Date of Hospital Admission: 5-23-24  Date of Hospital Discharge: 5-23-24  Risk of hospital readmission (high >=14%. Medium >=10%) :Readmission Risk Score: 26.8      Care management risk score Rising risk (score 2-5) and Complex Care (Scores >=6): No Risk Score On File     Non face to face  following discharge, date last encounter closed (first attempt may have been earlier): 05/31/2024    Call initiated 2 business days of discharge: No    ASSESSMENT/PLAN:   Hospital discharge follow-up  -     SC DISCHARGE MEDS RECONCILED W/ CURRENT OUTPATIENT MED LIST  Essential hypertension  -     AFL (Epic) - Roshan Morales MD, Cardiology, [place]  Hypotension, unspecified hypotension type  -     AFL (Epic) - Roshan Morales MD, Cardiology, [place]  Orthostatic hypotension  -     Elastic Bandages & Supports (MEDICAL COMPRESSION STOCKINGS) MISC; DAILY Starting u 6/13/2024, Disp-2 each, R-11, PrintRight and left knee high compression stockings.  30-40 mmHg.  To be worn continuously throughout the day.  -     AFL (Epic) - Roshan Morales MD, Cardiology, [place]    Staples removed, Hold midodrine if BP elevated >140/>90 do not use wrist cuff,   Wear compression stockings.  Follow-up with urology, cardiology, rheumatology, gastro.     Medical Decision Making: high complexity  No follow-ups on file.           Subjective:   HPI:  Follow up of Hospital problems/diagnosis(es): Syncope. Falls. Hypomagnesemia. Chirrosis.     Inpatient course: Discharge summary reviewed- see chart.    Interval history/Current status: Having high BP at times. They changes a lot of his meds. He did have positive orthostatics. Using wrist cuff. Today in office BP is low. Going to get cystoscopy. Needs follow up with urology, Rheumatology, Cardiology. The patient has staples in head from the first fall.     Patient Active

## 2024-06-17 ENCOUNTER — HOSPITAL ENCOUNTER (OUTPATIENT)
Dept: PREADMISSION TESTING | Age: 64
Discharge: HOME | End: 2024-06-21

## 2024-06-18 ENCOUNTER — TELEPHONE (OUTPATIENT)
Dept: PRIMARY CARE CLINIC | Age: 64
End: 2024-06-18

## 2024-06-18 VITALS — WEIGHT: 173 LBS | HEIGHT: 70 IN | BODY MASS INDEX: 24.77 KG/M2

## 2024-06-18 NOTE — TELEPHONE ENCOUNTER
Andres WILCOX with [facility] called in stating pt missed appt this morning.   
Never

## 2024-06-18 NOTE — PROGRESS NOTES
Pre-op Instructions For Out-Patient Surgery    Medication Instructions:  Please stop herbs and any supplements now (includes vitamins and minerals). None    Please contact your surgeon and prescribing physician for pre-op instructions for any blood thinners. N/A    If you have inhalers/aerosol treatments at home, please use them the morning of your surgery and bring the inhalers with you to the hospital. N/A    Please take the following medications the morning of your surgery with a sip of water: Midodrine and Lopressor    Surgery Instructions:  After midnight before surgery:  Do not eat or drink anything, including water, mints, gum, and hard candy.  You may brush your teeth without swallowing.  No smoking, chewing tobacco, or street drugs.    Please shower or bathe before surgery.       Please do not wear any cologne, lotion, powder, deodorant, jewelry, piercings, perfume, makeup, nail polish, hair accessories, or hair spray on the day of surgery.  Wear loose comfortable clothing.    Leave your valuables at home but bring a payment source for any after-surgery prescriptions you plan to fill at [facility].  Bring a storage case for any glasses/contacts.    An adult who is responsible for you MUST drive you home and should be with you for the first 24 hours after surgery. Nilam     The Day of Surgery:  Arrive at [facility] Surgery Entrance at the time directed by your surgeon and check in at the desk.     If you have a living will or healthcare power of , please bring a copy.    You will be taken to the pre-op holding area where you will be prepared for surgery.  A physical assessment will be performed by a nurse practitioner or house officer.  Your IV will be started and you will meet your anesthesiologist.    When you go to surgery, your family will be directed to the surgical waiting room, where the doctor should speak with them after your

## 2024-06-19 LAB
EKG ATRIAL RATE: 57 BPM
EKG ATRIAL RATE: 66 BPM
EKG P AXIS: 37 DEGREES
EKG P AXIS: 46 DEGREES
EKG P-R INTERVAL: 116 MS
EKG P-R INTERVAL: 164 MS
EKG Q-T INTERVAL: 470 MS
EKG Q-T INTERVAL: 488 MS
EKG QRS DURATION: 82 MS
EKG QRS DURATION: 86 MS
EKG QTC CALCULATION (BAZETT): 474 MS
EKG QTC CALCULATION (BAZETT): 492 MS
EKG R AXIS: 45 DEGREES
EKG R AXIS: 57 DEGREES
EKG T AXIS: 39 DEGREES
EKG T AXIS: 49 DEGREES
EKG VENTRICULAR RATE: 57 BPM
EKG VENTRICULAR RATE: 66 BPM

## 2024-06-25 ENCOUNTER — APPOINTMENT (OUTPATIENT)
Dept: GENERAL RADIOLOGY | Age: 64
End: 2024-06-25
Payer: COMMERCIAL

## 2024-06-25 ENCOUNTER — HOSPITAL ENCOUNTER (INPATIENT)
Age: 64
LOS: 1 days | Discharge: HOME | End: 2024-06-26
Attending: STUDENT IN AN ORGANIZED HEALTH CARE EDUCATION/TRAINING PROGRAM | Admitting: INTERNAL MEDICINE
Payer: COMMERCIAL

## 2024-06-25 ENCOUNTER — APPOINTMENT (OUTPATIENT)
Dept: CT IMAGING | Age: 64
End: 2024-06-25
Payer: COMMERCIAL

## 2024-06-25 DIAGNOSIS — K92.2 GASTROINTESTINAL HEMORRHAGE, UNSPECIFIED GASTROINTESTINAL HEMORRHAGE TYPE: Primary | ICD-10-CM

## 2024-06-25 DIAGNOSIS — K92.0 COFFEE GROUND EMESIS: ICD-10-CM

## 2024-06-25 PROBLEM — K85.90 ACUTE PANCREATITIS WITHOUT INFECTION OR NECROSIS: Status: ACTIVE | Noted: 2024-06-25

## 2024-06-25 LAB
ALBUMIN SERPL-MCNC: 2.8 G/DL (ref 3.5–5.2)
ALP SERPL-CCNC: 204 U/L (ref 40–129)
ALT SERPL-CCNC: 15 U/L (ref 5–41)
ANION GAP SERPL CALCULATED.3IONS-SCNC: 14 MMOL/L (ref 9–17)
AST SERPL-CCNC: 81 U/L
BASOPHILS # BLD: 0 K/UL (ref 0–0.2)
BASOPHILS NFR BLD: 0 % (ref 0–2)
BILIRUB SERPL-MCNC: 3.6 MG/DL (ref 0.3–1.2)
BUN SERPL-MCNC: 10 MG/DL (ref 8–23)
CALCIUM SERPL-MCNC: 8.6 MG/DL (ref 8.6–10.4)
CHLORIDE SERPL-SCNC: 96 MMOL/L (ref 98–107)
CO2 SERPL-SCNC: 24 MMOL/L (ref 20–31)
CREAT SERPL-MCNC: 0.7 MG/DL (ref 0.7–1.2)
EOSINOPHIL # BLD: 0 K/UL (ref 0–0.4)
EOSINOPHILS RELATIVE PERCENT: 0 % (ref 0–4)
ERYTHROCYTE [DISTWIDTH] IN BLOOD BY AUTOMATED COUNT: 15.1 % (ref 11.5–14.9)
GFR, ESTIMATED: >90 ML/MIN/1.73M2
GLUCOSE SERPL-MCNC: 108 MG/DL (ref 70–99)
HCT VFR BLD AUTO: 32.4 % (ref 41–53)
HGB BLD-MCNC: 10.8 G/DL (ref 13.5–17.5)
INR PPP: 1.4
LIPASE SERPL-CCNC: 157 U/L (ref 13–60)
LYMPHOCYTES NFR BLD: 0.4 K/UL (ref 1–4.8)
LYMPHOCYTES RELATIVE PERCENT: 5 % (ref 24–44)
MAGNESIUM SERPL-MCNC: 1.2 MG/DL (ref 1.6–2.6)
MCH RBC QN AUTO: 30.5 PG (ref 26–34)
MCHC RBC AUTO-ENTMCNC: 33.3 G/DL (ref 31–37)
MCV RBC AUTO: 91.6 FL (ref 80–100)
MONOCYTES NFR BLD: 0.9 K/UL (ref 0.1–1.3)
MONOCYTES NFR BLD: 14 % (ref 1–7)
NEUTROPHILS NFR BLD: 81 % (ref 36–66)
NEUTS SEG NFR BLD: 5.6 K/UL (ref 1.3–9.1)
PLATELET # BLD AUTO: ABNORMAL K/UL (ref 150–450)
PMV BLD AUTO: 8 FL (ref 6–12)
POTASSIUM SERPL-SCNC: 3.9 MMOL/L (ref 3.7–5.3)
PROT SERPL-MCNC: 6.8 G/DL (ref 6.4–8.3)
PROTHROMBIN TIME: 17.4 SEC (ref 11.8–14.6)
RBC # BLD AUTO: 3.53 M/UL (ref 4.5–5.9)
SODIUM SERPL-SCNC: 134 MMOL/L (ref 135–144)
TROPONIN I SERPL HS-MCNC: 10 NG/L (ref 0–22)
TROPONIN I SERPL HS-MCNC: 12 NG/L (ref 0–22)
WBC OTHER # BLD: 6.9 K/UL (ref 3.5–11)

## 2024-06-25 PROCEDURE — 71045 X-RAY EXAM CHEST 1 VIEW: CPT

## 2024-06-25 PROCEDURE — 2060000000 HC ICU INTERMEDIATE R&B

## 2024-06-25 PROCEDURE — 83735 ASSAY OF MAGNESIUM: CPT

## 2024-06-25 PROCEDURE — 36415 COLL VENOUS BLD VENIPUNCTURE: CPT

## 2024-06-25 PROCEDURE — 96374 THER/PROPH/DIAG INJ IV PUSH: CPT

## 2024-06-25 PROCEDURE — 83690 ASSAY OF LIPASE: CPT

## 2024-06-25 PROCEDURE — 99285 EMERGENCY DEPT VISIT HI MDM: CPT

## 2024-06-25 PROCEDURE — 84484 ASSAY OF TROPONIN QUANT: CPT

## 2024-06-25 PROCEDURE — 80053 COMPREHEN METABOLIC PANEL: CPT

## 2024-06-25 PROCEDURE — 6360000004 HC RX CONTRAST MEDICATION

## 2024-06-25 PROCEDURE — 96361 HYDRATE IV INFUSION ADD-ON: CPT

## 2024-06-25 PROCEDURE — 6370000000 HC RX 637 (ALT 250 FOR IP): Performed by: NURSE PRACTITIONER

## 2024-06-25 PROCEDURE — 96375 TX/PRO/DX INJ NEW DRUG ADDON: CPT

## 2024-06-25 PROCEDURE — 6360000002 HC RX W HCPCS

## 2024-06-25 PROCEDURE — 93005 ELECTROCARDIOGRAM TRACING: CPT

## 2024-06-25 PROCEDURE — C9113 INJ PANTOPRAZOLE SODIUM, VIA: HCPCS

## 2024-06-25 PROCEDURE — 85025 COMPLETE CBC W/AUTO DIFF WBC: CPT

## 2024-06-25 PROCEDURE — 2580000003 HC RX 258

## 2024-06-25 PROCEDURE — 85610 PROTHROMBIN TIME: CPT

## 2024-06-25 PROCEDURE — 74177 CT ABD & PELVIS W/CONTRAST: CPT

## 2024-06-25 RX ORDER — LOSARTAN POTASSIUM 50 MG/1
50 TABLET ORAL DAILY
Status: ON HOLD | COMMUNITY
End: 2024-06-26 | Stop reason: HOSPADM

## 2024-06-25 RX ORDER — SODIUM CHLORIDE 0.9 % (FLUSH) 0.9 %
5-40 SYRINGE (ML) INJECTION PRN
Status: DISCONTINUED | OUTPATIENT
Start: 2024-06-25 | End: 2024-06-26 | Stop reason: HOSPADM

## 2024-06-25 RX ORDER — MORPHINE SULFATE 2 MG/ML
2 INJECTION, SOLUTION INTRAMUSCULAR; INTRAVENOUS
Status: DISCONTINUED | OUTPATIENT
Start: 2024-06-25 | End: 2024-06-26 | Stop reason: HOSPADM

## 2024-06-25 RX ORDER — POTASSIUM CHLORIDE 7.45 MG/ML
10 INJECTION INTRAVENOUS PRN
Status: DISCONTINUED | OUTPATIENT
Start: 2024-06-25 | End: 2024-06-26 | Stop reason: HOSPADM

## 2024-06-25 RX ORDER — LACTULOSE 10 G/15ML
30 SOLUTION ORAL 3 TIMES DAILY
Status: DISCONTINUED | OUTPATIENT
Start: 2024-06-26 | End: 2024-06-26 | Stop reason: HOSPADM

## 2024-06-25 RX ORDER — PROCHLORPERAZINE EDISYLATE 5 MG/ML
10 INJECTION INTRAMUSCULAR; INTRAVENOUS EVERY 6 HOURS PRN
Status: DISCONTINUED | OUTPATIENT
Start: 2024-06-25 | End: 2024-06-26 | Stop reason: HOSPADM

## 2024-06-25 RX ORDER — SODIUM CHLORIDE 9 MG/ML
INJECTION, SOLUTION INTRAVENOUS PRN
Status: DISCONTINUED | OUTPATIENT
Start: 2024-06-25 | End: 2024-06-26 | Stop reason: HOSPADM

## 2024-06-25 RX ORDER — PANTOPRAZOLE SODIUM 40 MG/1
40 TABLET, DELAYED RELEASE ORAL 2 TIMES DAILY
Status: ON HOLD | COMMUNITY
End: 2024-06-26

## 2024-06-25 RX ORDER — POTASSIUM CHLORIDE 7.45 MG/ML
20 INJECTION INTRAVENOUS PRN
Status: DISCONTINUED | OUTPATIENT
Start: 2024-06-25 | End: 2024-06-26 | Stop reason: HOSPADM

## 2024-06-25 RX ORDER — MAGNESIUM SULFATE HEPTAHYDRATE 40 MG/ML
2000 INJECTION, SOLUTION INTRAVENOUS PRN
Status: DISCONTINUED | OUTPATIENT
Start: 2024-06-25 | End: 2024-06-26 | Stop reason: HOSPADM

## 2024-06-25 RX ORDER — SODIUM CHLORIDE 0.9 % (FLUSH) 0.9 %
5-40 SYRINGE (ML) INJECTION EVERY 12 HOURS SCHEDULED
Status: DISCONTINUED | OUTPATIENT
Start: 2024-06-26 | End: 2024-06-26 | Stop reason: HOSPADM

## 2024-06-25 RX ORDER — MIDODRINE HYDROCHLORIDE 2.5 MG/1
2.5 TABLET ORAL
Status: DISCONTINUED | OUTPATIENT
Start: 2024-06-26 | End: 2024-06-26 | Stop reason: HOSPADM

## 2024-06-25 RX ORDER — ONDANSETRON 2 MG/ML
4 INJECTION INTRAMUSCULAR; INTRAVENOUS ONCE
Status: COMPLETED | OUTPATIENT
Start: 2024-06-25 | End: 2024-06-25

## 2024-06-25 RX ORDER — 0.9 % SODIUM CHLORIDE 0.9 %
100 INTRAVENOUS SOLUTION INTRAVENOUS ONCE
Status: COMPLETED | OUTPATIENT
Start: 2024-06-25 | End: 2024-06-25

## 2024-06-25 RX ORDER — SPIRONOLACTONE 25 MG/1
25 TABLET ORAL DAILY
Status: ON HOLD | COMMUNITY
End: 2024-06-26 | Stop reason: HOSPADM

## 2024-06-25 RX ORDER — SODIUM CHLORIDE 0.9 % (FLUSH) 0.9 %
10 SYRINGE (ML) INJECTION PRN
Status: DISCONTINUED | OUTPATIENT
Start: 2024-06-25 | End: 2024-06-26 | Stop reason: HOSPADM

## 2024-06-25 RX ADMIN — METOPROLOL TARTRATE 12.5 MG: 25 TABLET, FILM COATED ORAL at 23:52

## 2024-06-25 RX ADMIN — SODIUM CHLORIDE 100 ML: 9 INJECTION, SOLUTION INTRAVENOUS at 21:16

## 2024-06-25 RX ADMIN — LACTULOSE 30 G: 20 SOLUTION ORAL at 23:53

## 2024-06-25 RX ADMIN — SODIUM CHLORIDE 40 MG: 9 INJECTION INTRAMUSCULAR; INTRAVENOUS; SUBCUTANEOUS at 20:43

## 2024-06-25 RX ADMIN — IOPAMIDOL 75 ML: 755 INJECTION, SOLUTION INTRAVENOUS at 21:16

## 2024-06-25 RX ADMIN — ONDANSETRON 4 MG: 2 INJECTION INTRAMUSCULAR; INTRAVENOUS at 20:41

## 2024-06-25 RX ADMIN — SODIUM CHLORIDE, PRESERVATIVE FREE 10 ML: 5 INJECTION INTRAVENOUS at 21:16

## 2024-06-25 ASSESSMENT — LIFESTYLE VARIABLES
HOW OFTEN DO YOU HAVE A DRINK CONTAINING ALCOHOL: 2-4 TIMES A MONTH
HOW MANY STANDARD DRINKS CONTAINING ALCOHOL DO YOU HAVE ON A TYPICAL DAY: 3 OR 4

## 2024-06-25 NOTE — ED PROVIDER NOTES
EMERGENCY DEPARTMENT ENCOUNTER   ATTENDING ATTESTATION     Pt Name: Frank Lisa  MRN: [id number]  Birthdate 1960  Date of evaluation: 6/25/24       Frank Lisa is a 63 y.o. male who presents with Abdominal Pain and Rectal Bleeding    Patient presented with some black tarry stools and abdominal pain    States he has been having some dark stool just 1 time tonight.  Also feeling a bit lightheaded here and there.    Plan is labs CT rectal exam        MDM:     Workup is reassuring but patient is high risk for GI bleed    Will admit for further care    Vitals:   Vitals:    06/25/24 2315 06/25/24 2330 06/25/24 2332 06/26/24 0031   BP: (!) 154/78  136/74    Pulse: 87 75 77    Resp: 16  16 16   Temp:   98.7 °F (37.1 °C)    TempSrc:   Oral    SpO2: 98%  100%          I personally saw and examined the patient. I have reviewed and agree with the resident's findings, including all diagnostic interpretations and treatment plan as written. I was present for the key portions of any procedures performed and the inclusive time noted for any critical care statement.    Sigifredo Casillas MD  Attending Emergency Physician           Sigifredo Casillas MD  06/26/24 0059    
bowel obstruction) (HCC), Sciatica, Secondary esophageal varices (HCC), Shortness of breath, Sleep difficulties, Spinal stenosis, Stomach ulcer, Thrombocytopenia (HCC), Tubular adenoma of colon, Under care of team, Under care of team, Vitamin D deficiency, and Wears glasses.       has a past surgical history that includes Bunionectomy; Nasal septum surgery; other surgical history (01/04/2016); Colonoscopy; Colonoscopy (10/05/2016); other surgical history (11/21/2016); other surgical history (12/19/2016); knee surgery (Left); Bunionectomy (Left); Endoscopy, colon, diagnostic; pr neuroplasty &/transpos median nrv carpal tunne (Right, 08/29/2017); Carpal tunnel release (Right); pr neuroplasty &/transpos median nrv carpal tunne (Left, 10/31/2017); Colonoscopy (N/A, 03/30/2018); Colonoscopy (03/30/2018); pr njx aa&/strd tfrml epi lumbar/sacral 1 level (Bilateral, 09/06/2018); other surgical history (09/28/2018); pr njx aa&/strd tfrml epi lumbar/sacral 1 level (N/A, 09/28/2018); Pain management procedure (Left, 07/09/2020); Pain management procedure (Left, 07/20/2020); Pain management procedure (Bilateral, 08/17/2020); other surgical history (Right, 11/23/2020); Nerve Block (Right, 11/23/2020); Pain management procedure (Bilateral, 12/07/2020); Upper gastrointestinal endoscopy (N/A, 12/29/2020); Upper gastrointestinal endoscopy (N/A, 02/02/2021); Upper gastrointestinal endoscopy (N/A, 02/12/2021); Upper gastrointestinal endoscopy (02/12/2021); [place] tooth extraction; IR PORT PLACEMENT > 5 YEARS (04/19/2021); Upper gastrointestinal endoscopy (N/A, 08/31/2021); Upper gastrointestinal endoscopy (N/A, 01/21/2022); Upper gastrointestinal endoscopy (N/A, 04/15/2022); Colonoscopy (N/A, 04/16/2022); Upper gastrointestinal endoscopy (N/A, 06/06/2022); Upper gastrointestinal endoscopy (N/A, 06/09/2022); Paracentesis; Upper gastrointestinal endoscopy (N/A, 09/14/2022); Upper gastrointestinal endoscopy (N/A, 10/19/2022); Upper

## 2024-06-26 ENCOUNTER — ANESTHESIA EVENT (OUTPATIENT)
Dept: ENDOSCOPY | Age: 64
End: 2024-06-26
Payer: COMMERCIAL

## 2024-06-26 ENCOUNTER — ANESTHESIA (OUTPATIENT)
Dept: ENDOSCOPY | Age: 64
End: 2024-06-26
Payer: COMMERCIAL

## 2024-06-26 VITALS
OXYGEN SATURATION: 96 % | WEIGHT: 175 LBS | BODY MASS INDEX: 25.92 KG/M2 | HEIGHT: 69 IN | SYSTOLIC BLOOD PRESSURE: 144 MMHG | TEMPERATURE: 97.9 F | DIASTOLIC BLOOD PRESSURE: 66 MMHG | HEART RATE: 56 BPM | RESPIRATION RATE: 16 BRPM

## 2024-06-26 LAB
ALBUMIN SERPL-MCNC: 2.5 G/DL (ref 3.5–5.2)
ALP SERPL-CCNC: 182 U/L (ref 40–129)
ALT SERPL-CCNC: 15 U/L (ref 5–41)
AMMONIA PLAS-SCNC: 38 UMOL/L (ref 16–60)
ANION GAP SERPL CALCULATED.3IONS-SCNC: 11 MMOL/L (ref 9–17)
AST SERPL-CCNC: 72 U/L
BILIRUB SERPL-MCNC: 3.1 MG/DL (ref 0.3–1.2)
BUN SERPL-MCNC: 11 MG/DL (ref 8–23)
CALCIUM SERPL-MCNC: 8.3 MG/DL (ref 8.6–10.4)
CHLORIDE SERPL-SCNC: 99 MMOL/L (ref 98–107)
CO2 SERPL-SCNC: 25 MMOL/L (ref 20–31)
CREAT SERPL-MCNC: 0.8 MG/DL (ref 0.7–1.2)
DATE, STOOL #1: 626
EKG Q-T INTERVAL: 406 MS
EKG QRS DURATION: 76 MS
EKG QTC CALCULATION (BAZETT): 479 MS
EKG R AXIS: 47 DEGREES
EKG T AXIS: 24 DEGREES
EKG VENTRICULAR RATE: 84 BPM
ERYTHROCYTE [DISTWIDTH] IN BLOOD BY AUTOMATED COUNT: 15.9 % (ref 11.5–14.9)
GFR, ESTIMATED: >90 ML/MIN/1.73M2
GLUCOSE SERPL-MCNC: 105 MG/DL (ref 70–99)
HCT VFR BLD AUTO: 29.3 % (ref 41–53)
HEMOCCULT SP1 STL QL: POSITIVE
HGB BLD-MCNC: 9.8 G/DL (ref 13.5–17.5)
LIPASE SERPL-CCNC: 32 U/L (ref 13–60)
MAGNESIUM SERPL-MCNC: 2.5 MG/DL (ref 1.6–2.6)
MCH RBC QN AUTO: 30.9 PG (ref 26–34)
MCHC RBC AUTO-ENTMCNC: 33.4 G/DL (ref 31–37)
MCV RBC AUTO: 92.5 FL (ref 80–100)
PLATELET # BLD AUTO: 64 K/UL (ref 150–450)
PMV BLD AUTO: 8.6 FL (ref 6–12)
POTASSIUM SERPL-SCNC: 3.1 MMOL/L (ref 3.7–5.3)
PROT SERPL-MCNC: 5.9 G/DL (ref 6.4–8.3)
RBC # BLD AUTO: 3.17 M/UL (ref 4.5–5.9)
SODIUM SERPL-SCNC: 135 MMOL/L (ref 135–144)
TIME, STOOL #1: 230
TRIGL SERPL-MCNC: 71 MG/DL
WBC OTHER # BLD: 7.2 K/UL (ref 3.5–11)

## 2024-06-26 PROCEDURE — 3700000000 HC ANESTHESIA ATTENDED CARE: Performed by: INTERNAL MEDICINE

## 2024-06-26 PROCEDURE — 36415 COLL VENOUS BLD VENIPUNCTURE: CPT

## 2024-06-26 PROCEDURE — 6370000000 HC RX 637 (ALT 250 FOR IP): Performed by: INTERNAL MEDICINE

## 2024-06-26 PROCEDURE — 43235 EGD DIAGNOSTIC BRUSH WASH: CPT | Performed by: INTERNAL MEDICINE

## 2024-06-26 PROCEDURE — 99223 1ST HOSP IP/OBS HIGH 75: CPT | Performed by: INTERNAL MEDICINE

## 2024-06-26 PROCEDURE — 93010 ELECTROCARDIOGRAM REPORT: CPT | Performed by: INTERNAL MEDICINE

## 2024-06-26 PROCEDURE — 3609017100 HC EGD: Performed by: INTERNAL MEDICINE

## 2024-06-26 PROCEDURE — 6360000002 HC RX W HCPCS: Performed by: NURSE PRACTITIONER

## 2024-06-26 PROCEDURE — 6370000000 HC RX 637 (ALT 250 FOR IP): Performed by: NURSE PRACTITIONER

## 2024-06-26 PROCEDURE — 7100000000 HC PACU RECOVERY - FIRST 15 MIN: Performed by: INTERNAL MEDICINE

## 2024-06-26 PROCEDURE — APPNB60 APP NON BILLABLE TIME 46-60 MINS: Performed by: NURSE PRACTITIONER

## 2024-06-26 PROCEDURE — 2580000003 HC RX 258

## 2024-06-26 PROCEDURE — 82140 ASSAY OF AMMONIA: CPT

## 2024-06-26 PROCEDURE — 82270 OCCULT BLOOD FECES: CPT

## 2024-06-26 PROCEDURE — 83690 ASSAY OF LIPASE: CPT

## 2024-06-26 PROCEDURE — 85027 COMPLETE CBC AUTOMATED: CPT

## 2024-06-26 PROCEDURE — 6360000002 HC RX W HCPCS: Performed by: NURSE ANESTHETIST, CERTIFIED REGISTERED

## 2024-06-26 PROCEDURE — C9113 INJ PANTOPRAZOLE SODIUM, VIA: HCPCS | Performed by: NURSE PRACTITIONER

## 2024-06-26 PROCEDURE — 84478 ASSAY OF TRIGLYCERIDES: CPT

## 2024-06-26 PROCEDURE — 80053 COMPREHEN METABOLIC PANEL: CPT

## 2024-06-26 PROCEDURE — 2580000003 HC RX 258: Performed by: NURSE PRACTITIONER

## 2024-06-26 PROCEDURE — 83735 ASSAY OF MAGNESIUM: CPT

## 2024-06-26 PROCEDURE — 7100000001 HC PACU RECOVERY - ADDTL 15 MIN: Performed by: INTERNAL MEDICINE

## 2024-06-26 PROCEDURE — 2709999900 HC NON-CHARGEABLE SUPPLY: Performed by: INTERNAL MEDICINE

## 2024-06-26 PROCEDURE — 6360000002 HC RX W HCPCS: Performed by: ANESTHESIOLOGY

## 2024-06-26 PROCEDURE — 0DJ08ZZ INSPECTION OF UPPER INTESTINAL TRACT, VIA NATURAL OR ARTIFICIAL OPENING ENDOSCOPIC: ICD-10-PCS | Performed by: INTERNAL MEDICINE

## 2024-06-26 PROCEDURE — 99232 SBSQ HOSP IP/OBS MODERATE 35: CPT | Performed by: INTERNAL MEDICINE

## 2024-06-26 PROCEDURE — 2500000003 HC RX 250 WO HCPCS: Performed by: NURSE PRACTITIONER

## 2024-06-26 PROCEDURE — 3700000001 HC ADD 15 MINUTES (ANESTHESIA): Performed by: INTERNAL MEDICINE

## 2024-06-26 RX ORDER — ONDANSETRON 2 MG/ML
4 INJECTION INTRAMUSCULAR; INTRAVENOUS ONCE
Status: COMPLETED | OUTPATIENT
Start: 2024-06-26 | End: 2024-06-26

## 2024-06-26 RX ORDER — POTASSIUM CHLORIDE 20 MEQ/1
40 TABLET, EXTENDED RELEASE ORAL ONCE
Status: COMPLETED | OUTPATIENT
Start: 2024-06-26 | End: 2024-06-26

## 2024-06-26 RX ORDER — SODIUM CHLORIDE 9 MG/ML
INJECTION, SOLUTION INTRAVENOUS CONTINUOUS
Status: DISCONTINUED | OUTPATIENT
Start: 2024-06-26 | End: 2024-06-26 | Stop reason: HOSPADM

## 2024-06-26 RX ORDER — PROPOFOL 10 MG/ML
INJECTION, EMULSION INTRAVENOUS CONTINUOUS PRN
Status: DISCONTINUED | OUTPATIENT
Start: 2024-06-26 | End: 2024-06-26 | Stop reason: SDUPTHER

## 2024-06-26 RX ORDER — SUCRALFATE 1 G/1
1 TABLET ORAL
Qty: 120 TABLET | Refills: 3 | Status: SHIPPED | OUTPATIENT
Start: 2024-06-26

## 2024-06-26 RX ORDER — SUCRALFATE 1 G/1
1 TABLET ORAL
Status: DISCONTINUED | OUTPATIENT
Start: 2024-06-26 | End: 2024-06-26 | Stop reason: HOSPADM

## 2024-06-26 RX ORDER — PANTOPRAZOLE SODIUM 40 MG/1
40 TABLET, DELAYED RELEASE ORAL 2 TIMES DAILY
Qty: 60 TABLET | Refills: 3 | Status: SHIPPED | OUTPATIENT
Start: 2024-06-26

## 2024-06-26 RX ADMIN — PROPOFOL 100 MCG/KG/MIN: 10 INJECTION, EMULSION INTRAVENOUS at 12:50

## 2024-06-26 RX ADMIN — POTASSIUM CHLORIDE 40 MEQ: 1500 TABLET, EXTENDED RELEASE ORAL at 10:08

## 2024-06-26 RX ADMIN — MAGNESIUM SULFATE HEPTAHYDRATE 2000 MG: 40 INJECTION, SOLUTION INTRAVENOUS at 00:30

## 2024-06-26 RX ADMIN — SODIUM CHLORIDE, PRESERVATIVE FREE 10 ML: 5 INJECTION INTRAVENOUS at 08:52

## 2024-06-26 RX ADMIN — PANTOPRAZOLE SODIUM 40 MG: 40 INJECTION, POWDER, FOR SOLUTION INTRAVENOUS at 08:37

## 2024-06-26 RX ADMIN — MORPHINE SULFATE 2 MG: 2 INJECTION, SOLUTION INTRAMUSCULAR; INTRAVENOUS at 04:41

## 2024-06-26 RX ADMIN — SUCRALFATE 1 G: 1 TABLET ORAL at 17:02

## 2024-06-26 RX ADMIN — LACTULOSE 30 G: 20 SOLUTION ORAL at 08:32

## 2024-06-26 RX ADMIN — MAGNESIUM SULFATE HEPTAHYDRATE 2000 MG: 40 INJECTION, SOLUTION INTRAVENOUS at 02:19

## 2024-06-26 RX ADMIN — MIDODRINE HYDROCHLORIDE 2.5 MG: 2.5 TABLET ORAL at 08:34

## 2024-06-26 RX ADMIN — ONDANSETRON 4 MG: 2 INJECTION INTRAMUSCULAR; INTRAVENOUS at 12:12

## 2024-06-26 RX ADMIN — METOPROLOL TARTRATE 12.5 MG: 25 TABLET, FILM COATED ORAL at 08:33

## 2024-06-26 RX ADMIN — SODIUM CHLORIDE: 9 INJECTION, SOLUTION INTRAVENOUS at 04:45

## 2024-06-26 RX ADMIN — MORPHINE SULFATE 2 MG: 2 INJECTION, SOLUTION INTRAMUSCULAR; INTRAVENOUS at 00:31

## 2024-06-26 ASSESSMENT — PAIN DESCRIPTION - PAIN TYPE
TYPE: ACUTE PAIN
TYPE: ACUTE PAIN

## 2024-06-26 ASSESSMENT — PAIN - FUNCTIONAL ASSESSMENT
PAIN_FUNCTIONAL_ASSESSMENT: ACTIVITIES ARE NOT PREVENTED
PAIN_FUNCTIONAL_ASSESSMENT: NONE - DENIES PAIN
PAIN_FUNCTIONAL_ASSESSMENT: 0-10
PAIN_FUNCTIONAL_ASSESSMENT: PREVENTS OR INTERFERES SOME ACTIVE ACTIVITIES AND ADLS
PAIN_FUNCTIONAL_ASSESSMENT: PREVENTS OR INTERFERES SOME ACTIVE ACTIVITIES AND ADLS

## 2024-06-26 ASSESSMENT — PAIN DESCRIPTION - ORIENTATION
ORIENTATION: LOWER
ORIENTATION: LOWER

## 2024-06-26 ASSESSMENT — PAIN SCALES - GENERAL
PAINLEVEL_OUTOF10: 6
PAINLEVEL_OUTOF10: 0

## 2024-06-26 ASSESSMENT — PAIN DESCRIPTION - ONSET
ONSET: ON-GOING
ONSET: ON-GOING

## 2024-06-26 ASSESSMENT — PAIN DESCRIPTION - LOCATION
LOCATION: ABDOMEN
LOCATION: RIB CAGE
LOCATION: ABDOMEN

## 2024-06-26 ASSESSMENT — PAIN DESCRIPTION - DESCRIPTORS
DESCRIPTORS: ACHING
DESCRIPTORS: THROBBING

## 2024-06-26 ASSESSMENT — ENCOUNTER SYMPTOMS: SHORTNESS OF BREATH: 1

## 2024-06-26 ASSESSMENT — PAIN DESCRIPTION - FREQUENCY
FREQUENCY: CONTINUOUS
FREQUENCY: INTERMITTENT

## 2024-06-26 ASSESSMENT — PAIN DESCRIPTION - DIRECTION: RADIATING_TOWARDS: DENIES

## 2024-06-26 NOTE — DISCHARGE INSTRUCTIONS
Heartburn/Anti-reflux instructions    Definition   Heartburn is a burning sensation in the lower chest. It is the main symptom of gastroesophageal reflux disease (GERD).     Causes   When you eat, food travels down the esophagus to the stomach. The muscle between the esophagus and stomach lets food enter the stomach. When this muscle weakens, stomach acid flows into the esophagus. This causes a burning sensation, called heartburn.     Other causes of GERD include:   Conditions that:   Interfere with food passing through the esophagus   Cause excess acid production   Possible genetic factor     Symptoms   Heartburn symptoms usually occur after overeating or lying down after a big meal. The symptoms may last for a few minutes or a few hours.   The severity of symptoms depends on the:   Reason the muscle is weakened   Amount of acid entering the esophagus   Amount of saliva to neutralize the acid   Symptoms include:   Burning feeling that starts in the lower chest and moves up the throat   Feeling that food is coming back up   Sour or bitter taste in the throat   Pain that increases when bending over, lying down, exercising, or lifting heavy objects   Other symptoms and complications of reflux include:   Sore throat   Hoarseness   Chronic cough   Feeling of a lump in the throat   Asthma   Hoarse voice ( laryngitis )   Waking up with a sensation of choking   If reflux persists, the acid can damage the esophagus. Symptoms of esophageal damage include:   Bleeding and ulcers in the esophagus   Difficulty swallowing   Vomiting blood   Black or tarry stools   Inflammation and scarring of the esophagus   Lezama's esophagus This is a precancerous condition of the esophagus that has no unique symptoms, but can be diagnosed by endoscopic exams.   Dental problems (due to the effect of the stomach acid on the tooth's enamel)   Treatment:   Treatment aims to decrease the number of episodes of heartburn and its complications. This

## 2024-06-26 NOTE — ANESTHESIA POSTPROCEDURE EVALUATION
Department of Anesthesiology  Postprocedure Note    Patient: Frank Lisa  MRN: [id number]  YOB: 1960  Date of evaluation: 6/26/2024    Procedure Summary       Date: 06/26/24 Room / Location: [place] [address] / [facility]    Anesthesia Start: 1248 Anesthesia Stop: 1302    Procedure: ESOPHAGOGASTRODUODENOSCOPY (Esophagus) Diagnosis:       Melena      (Melena [K92.1])    Surgeons: Kole Guo MD Responsible Provider: Gail Bonilla MD    Anesthesia Type: General ASA Status: 3            Anesthesia Type: General    Yen Phase I: Yen Score: 10    Yen Phase II:      Anesthesia Post Evaluation    Comments: POST- ANESTHESIA EVALUATION       Pt Name: Frank Lisa  MRN: [id number]  YOB: 1960  Date of evaluation: 6/26/2024  Time:  3:15 PM      BP (!) 149/66   Pulse 58   Temp 97.3 °F (36.3 °C)   Resp 15   Ht 1.762 m (5' 9.37\")   Wt 79.4 kg (175 lb)   SpO2 99%   BMI 25.57 kg/m²      Consciousness Level  Awake  Cardiopulmonary Status  Stable  Pain Adequately Treated YES  Nausea / Vomiting  NO  Adequate Hydration  YES  Anesthesia Related Complications NONE      Electronically signed by Gail Bonilla MD on 6/26/2024 at 3:15 PM           No notable events documented.

## 2024-06-26 NOTE — H&P
[facility] Internal Medicine  Marcelo Sutton MD; Parmjit Parry MD; Jaziel Cerda MD; MD Jessika Diego MD; Alexia Tobar MD; Kathi Russo MD      HISTORY AND PHYSICAL EXAMINATION            Date:   6/26/2024  Patient name:  Frank Lisa  MRN:   [id number]  Account:  [id number]  YOB: 1960  PCP:    Lopez Rosario PA  Code Status:    Full Code    Chief Complaint:     Chief Complaint   Patient presents with    Abdominal Pain    Rectal Bleeding         History Obtained From:     Patient, EMR, nursing staff    HPI     This patient is a 63 y.o. Non- / non  malewho presents with  Frank Lisa is a 63 y.o. Non- / non  male who presents with Abdominal Pain and Rectal Bleeding  Medical history significant for atrial fibrillation, COPD, alcoholic liver cirrhosis s/p TIPS procedure, Hepatitis C, hypomagnesia, Hx of GI bleed, Hx of esophageal cancer in remission.  Presented to ED complaining of abdominal pain and episode of black tarry stool.  Endorses nausea without vomiting.  Transaminitis with alk phos 204, AST 81, ALT 15, total bilirubin 3.6, lipase 157.  CT abdomen reveals no acute abnormality. No leukocytosis WBC 6.9.  Hemoglobin stable at 10.8 with chronic anemia.  PT 17.4, INR 1.4. Patent does follow outpatient with GI. On 5/10/24 he had an EGD that revealed esophagitis and gastritis with no mention of esophageal varices. He also had a G-tube removed due to it no longer being utilized. Ongoing hypomagnesia, current magnesium level 1.2. Troponin 12 repeat 10.  Denies chest pain.  No EKG changes. Denies fever, chills, chest pain, cough, diarrhea, and urinary symptoms.Symptoms are reported as acute, constant and moderate in severity..       Review of Systems:     Denies any shortness of breath or cough  Denies chest pain or palpitations  Positive for abdominal pain, vomiting, black stool  Denies any new numbness tremors or weakness.    A 10

## 2024-06-26 NOTE — OP NOTE
PROCEDURE NOTE    DATE OF PROCEDURE: 6/26/2024     SURGEON: Kole Guo MD  Facility: [facility]  ASSISTANT: None  Anesthesia: Monitored anesthesia care  PREOPERATIVE DIAGNOSIS: Hematemesis    Diagnosis:    POSTOPERATIVE DIAGNOSIS: As described below    OPERATION: Upper GI endoscopy with Biopsy    ANESTHESIA: Moderate Sedation     ESTIMATED BLOOD LOSS: Less than 50 ml    COMPLICATIONS: None.     SPECIMENS:  Was Not Obtained    HISTORY: The patient is a 63 y.o. year old male with history of above preop diagnosis.  I recommended esophagogastroduodenoscopy with possible biopsy and I explained the risk, benefits, expected outcome, and alternatives to the procedure.  Risks included but are not limited to bleeding, infection, respiratory distress, hypotension, and perforation of the esophagus, stomach, or duodenum.  Patient understands and is in agreement.      PROCEDURE: The patient was given IV conscious sedation.  The patient's SPO2 remained above 90% throughout the procedure.The gastroscope was inserted orally and advanced under direct vision through the esophagus, through the stomach, through the pylorus, and into the descending duodenum.      Post sedation note :The patient's SPO2 remained above 90% throughout the procedure.the vital signs remained stable , and no immediate complication form the procedure noted, patient will be ready for d/c when criteria is met .      Findings:    Retropharyngeal area was grossly normal appearing    Esophagus: abnormal: severe esophagitis, LA-grade-D from 30 cm to 40 cm from incisors    Esophagogastric markings: Diaphragmatic hiatus- 40 cm; GE junction- 40 cm; Squamo-columnar junction- 40 cm    Stomach:    Fundus: normal    Body: normal    Antrum: normal    Duodenum:     Descending: normal    Bulb: normal    The scope was removed and the patient tolerated the procedure well.     Impression- LA-grade-D esophagitis    Recommendations/Plan:   Strict reflux  precautions  Protonix 40 mg BID  Carafate 1 gm AC and HS  F/U In Office in 3-4 weeks  Discussed with the family    Electronically signed by Kole Guo MD  on 6/26/2024 at 12:55 PM

## 2024-06-26 NOTE — CONSULTS
Gastroenterology Consult Note    Patient:   Frank Lisa   Admit date:  6/25/2024  Facility:   [facility]  Referring/PCP: Lopez Rosario PA  Date:     6/26/2024  Consultant:   MASSIMO Smith NP, Kole Guo MD    Subjective:     This 63 y.o. male was admitted 6/25/2024 with a diagnosis of \"Gastrointestinal hemorrhage, unspecified gastrointestinal hemorrhage type [K92.2]  Acute pancreatitis without infection or necrosis [K85.90]\" and is seen in consultation regarding   Chief Complaint   Patient presents with    Abdominal Pain    Rectal Bleeding     63-year-old male with past medical history of cirrhosis secondary to EtOH abuse complicated by EV, recurrent ascites status post TIPS, TALA, hepatitis C treated with Harvoni, GERD, esophageal cancer treated with chemoradiation, history of bowel perforation requiring right hemicolectomy with end ileostomy reversed on 10/10/2023 presents to ED for melena, abdominal pain.  In the ED alk phos 204, ALT 15, AST 81, total bilirubin was 3.6, lipase 157.  CT abdomen did not reveal any acute pathology.  No leukocytosis.  Hemoglobin stable 10.8.  PT 17.4, INR 1.4.  Patient did have recent EGD on 5/10/2024 that revealed esophagitis, gastritis.  No EV noted.  Patient seen down in preop.  He had episode of emesis.  No coffee-ground reported.  Patient denies any fevers, chills, chest pain, cough, diarrhea, constipation, dysuria.  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 Mc, orthopedic, every 3-4 months, gets steroid injection    Lezama  quittin.4     Passive exposure: Never    Smokeless tobacco: Never   Substance Use Topics    Alcohol use: Yes     Comment: weekends       Family History:   Family History   Problem Relation Age of Onset    Cancer Mother         pancreatic    Cancer Father         bone    Diabetes Sister     Asthma Brother     Allergies Brother         MULTIPLE       Diet:  Diet NPO Exceptions are: Ice Chips, Sips of Water with Meds    Scheduled Medications:    [MAR Hold] pantoprazole (PROTONIX) 40 mg in sodium chloride (PF) 0.9 % 10 mL injection  40 mg IntraVENous Q12H    [MAR Hold] lactulose  30 g Oral TID    [MAR Hold] metoprolol tartrate  12.5 mg Oral BID    [MAR Hold] midodrine  2.5 mg Oral TID WC    [MAR Hold] sodium chloride flush  5-40 mL IntraVENous 2 times per day     Infusions:    [MAR Hold] sodium chloride 100 mL/hr at 24 0445    [MAR Hold] sodium chloride       PRN Medications: [MAR Hold] sodium chloride flush, [MAR Hold] sodium chloride flush, [MAR Hold] sodium chloride, [MAR Hold] potassium chloride **OR** [MAR Hold] potassium chloride, [MAR Hold] magnesium sulfate, [MAR Hold] morphine, [MAR Hold] prochlorperazine    Allergies:   Allergies   Allergen Reactions    Pollen Extract Other (See Comments)     Runny nose, watery eyes       ROS: Constitutional: negative for chills, fevers and sweats  Eyes: negative for cataracts, icterus and redness  Ears, nose, mouth, throat, and face: negative for epistaxis, hearing loss and sore throat  Respiratory: negative for cough, hemoptysis and sputum  Cardiovascular: negative for chest pain, dyspnea and lower extremity edema  Gastrointestinal: as per HPI  Genitourinary: negative for dysuria, frequency and hematuria  Neurological: negative for coordination problems, dizziness and gait problems  Behavioral/Psych: negative for anxiety, depression and mood swings    Objective:     Physical Exam:   BP (!) 162/88   Pulse 62   Temp 97 °F (36.1 °C) (Infrared)   Resp 16   Ht 1.762

## 2024-06-26 NOTE — DISCHARGE INSTR - COC
Continuity of Care Form    Patient Name: Frank Lisa   :  1960  MRN:  [id number]    Admit date:  2024  Discharge date:  ***    Code Status Order: Full Code   Advance Directives:   Advance Care Flowsheet Documentation       Date/Time Healthcare Directive Type of Healthcare Directive Copy in Chart Healthcare Agent Appointed Healthcare Agent's Name Healthcare Agent's Phone Number    [phone] Yes, patient has an advance directive for healthcare treatment Durable power of  for health care;Living will Yes, copy in chart -- -- --            Admitting Physician:  Jessika Abad MD  PCP: Lopez Rosario PA    Discharging Nurse: ***  Discharging Hospital Unit/Room#: 2105/2105-01  Discharging Unit Phone Number: ***    Emergency Contact:   Extended Emergency Contact Information  Primary Emergency Contact: Nilam Lisa  Address: [address] [place] [address]  Home Phone: [phone]  Work Phone: [phone]  Mobile Phone: [phone]  Relation: Ex-Spouse    Past Surgical History:  Past Surgical History:   Procedure Laterality Date    BUNIONECTOMY      twice on right side    BUNIONECTOMY Left     CARPAL TUNNEL RELEASE Right     COLON SURGERY  10/10/2023    EXPLORATORY LAPAROTOMY, REVERSAL ILEOSTOMY WITH ILEOCOLOSTOMY, LYSIS OF ADHESIONS,    COLONOSCOPY      at age 39    COLONOSCOPY  10/05/2016    polyps-pathology tubular adenoma, and abnormal looking mucosa right colon-pathology-tubular adenoma    COLONOSCOPY N/A 2018    COLONOSCOPY POLYPECTOMY COLD BIOPSY performed by Augusto Cordova MD at [facility] OR    COLONOSCOPY  2018    Small polyp in the sigmoid colon and excised with biopsy forceps--tubular adenoma    COLONOSCOPY N/A 2022    COLONOSCOPY POLYPECTOMY performed by Augusto Cordova MD at [facility] OR    ENDOSCOPY, COLON, DIAGNOSTIC      EGD    ESOPHAGOGASTRODUODENOSCOPY  2022    ESOPHAGOGASTRODUODENOSCOPY N/A 2023    IR PORT PLACEMENT EQUAL OR  High carcinoembryonic antigen (CEA) R97.0    Malignant neoplasm of esophagus (HCC) C15.9    Small bowel obstruction (HCC) K56.609    Wrist arthritis M19.039    Pain in joint involving right ankle and foot M25.571    Syncope R55    Other microscopic hematuria R31.29    Dizziness R42    Acute pancreatitis without infection or necrosis K85.90       Isolation/Infection:   Isolation            No Isolation          Patient Infection Status       Infection Onset Added Last Indicated Last Indicated By Review Planned Expiration Resolved Resolved By    None active    Resolved    COVID-19 21 COVID-19   21 Infection                        Nurse Assessment:  Last Vital Signs: BP (!) 149/66   Pulse 58   Temp 97.3 °F (36.3 °C)   Resp 15   Ht 1.762 m (5' 9.37\")   Wt 79.4 kg (175 lb)   SpO2 99%   BMI 25.57 kg/m²     Last documented pain score (0-10 scale): Pain Level: 7  Last Weight:   Wt Readings from Last 1 Encounters:   24 79.4 kg (175 lb)     Mental Status:  {IP PT MENTAL STATUS:}    IV Access:  { LACY IV ACCESS:396648137}    Nursing Mobility/ADLs:  Walking   {CHP DME ADLs:932648092}  Transfer  {CHP DME ADLs:791932776}  Bathing  {CHP DME ADLs:395112307}  Dressing  {CHP DME ADLs:787359622}  Toileting  {CHP DME ADLs:150155928}  Feeding  {CHP DME ADLs:759655452}  Med Admin  {CHP DME ADLs:795071220}  Med Delivery   { LACY MED Delivery:266236948}    Wound Care Documentation and Therapy:  Wound 24 Head Posterior;Upper (Active)   Dressing Status Intact 24 0800   Dressing/Treatment Open to air 24 0937   Wound Assessment Dry 24 0912   Drainage Amount None (dry) 24 0912   Odor None 24 0912   Number of days: 34        Elimination:  Continence:   Bowel: {YES / NO:}  Bladder: {YES / NO:}  Urinary Catheter: {Urinary Catheter:779725432}   Colostomy/Ileostomy/Ileal Conduit: {YES / NO:}       Date of Last BM: ***    Intake/Output Summary

## 2024-06-26 NOTE — ANESTHESIA PRE PROCEDURE
of hearing)     Hyperlipidemia     Hypertension     Hyponatremia 07/20/2016    Hypotension 12/20/2021    Lumbar radiculitis 11/08/2016    Mastoid disorder, bilateral 12/31/2022    biLateral mastoid effusion    Pericardial effusion     PONV (postoperative nausea and vomiting)     Poor venous access     has port in place.    Port-A-Cath in place     right upper chest    Portal hypertension (HCC)     SBO (small bowel obstruction) (HCC) 04/01/2024    Sciatica     Secondary esophageal varices (HCC) 06/07/2022    Shortness of breath     Sleep difficulties     Spinal stenosis     Stomach ulcer     hx of    Thrombocytopenia (HCC) 12/23/2020    Tubular adenoma of colon 2016, 2018    Under care of team     PCP- Lopez LEONE    Under care of team     GI- Dr Cordova    Vitamin D deficiency     Wears glasses        Past Surgical History:        Procedure Laterality Date    BUNIONECTOMY      twice on right side    BUNIONECTOMY Left     CARPAL TUNNEL RELEASE Right     COLON SURGERY  10/10/2023    EXPLORATORY LAPAROTOMY, REVERSAL ILEOSTOMY WITH ILEOCOLOSTOMY, LYSIS OF ADHESIONS,    COLONOSCOPY      at age 39    COLONOSCOPY  10/05/2016    polyps-pathology tubular adenoma, and abnormal looking mucosa right colon-pathology-tubular adenoma    COLONOSCOPY N/A 03/30/2018    COLONOSCOPY POLYPECTOMY COLD BIOPSY performed by Augusto Cordova MD at [facility] OR    COLONOSCOPY  03/30/2018    Small polyp in the sigmoid colon and excised with biopsy forceps--tubular adenoma    COLONOSCOPY N/A 04/16/2022    COLONOSCOPY POLYPECTOMY performed by Augusto Cordova MD at [facility] OR    ENDOSCOPY, COLON, DIAGNOSTIC      EGD    ESOPHAGOGASTRODUODENOSCOPY  12/29/2022    ESOPHAGOGASTRODUODENOSCOPY N/A 01/03/2023    IR PORT PLACEMENT EQUAL OR GREATER THAN 5 YEARS  04/19/2021    IR PORT PLACEMENT EQUAL OR GREATER THAN 5 YEARS 4/19/2021 [facility] SPECIAL PROCEDURES    IR TIPS INSERTION  12/01/2022    IR TIPS INSERTION 12/1/2022 Scar VELEZ MD [facility]

## 2024-06-26 NOTE — CARE COORDINATION
Case Management Assessment  Initial Evaluation    Date/Time of Evaluation: 6/26/2024 2:51 PM  Assessment Completed by: Mary Ellen Resendez RN    If patient is discharged prior to next notation, then this note serves as note for discharge by case management.    Patient Name: Frank Lisa                   YOB: 1960  Diagnosis: Gastrointestinal hemorrhage, unspecified gastrointestinal hemorrhage type [K92.2]  Acute pancreatitis without infection or necrosis [K85.90]                   Date / Time: 6/25/2024  7:27 PM    Patient Admission Status: Inpatient   Readmission Risk (Low < 19, Mod (19-27), High > 27): Readmission Risk Score: 41.2    Current PCP: Lopez Rosario PA  PCP verified by CM? No    Chart Reviewed: Yes      History Provided by: Patient  Patient Orientation: Alert and Oriented    Patient Cognition: Alert    Hospitalization in the last 30 days (Readmission):  No    If yes, Readmission Assessment in CM Navigator will be completed.    Advance Directives:      Code Status: Full Code   Patient's Primary Decision Maker is: Patient Declined (Legal Next of Kin Remains as Decision Maker)    Primary Decision Maker: Maria LNilam - Ex-Spouse - [phone]    Discharge Planning:    Patient lives with: Alone Type of Home: House  Primary Care Giver: Self  Patient Support Systems include: Family Members   Current Financial resources: Medicare  Current community resources: None  Current services prior to admission: None            Current DME: Cane            Type of Home Care services:  None    ADLS  Prior functional level: Independent in ADLs/IADLs  Current functional level: Independent in ADLs/IADLs    PT AM-PAC:   /24  OT AM-PAC:   /24    Family can provide assistance at DC:    Would you like Case Management to discuss the discharge plan with any other family members/significant others, and if so, who? No  Plans to Return to Present Housing: Yes  Other Identified Issues/Barriers to RETURNING to

## 2024-06-26 NOTE — PROGRESS NOTES
[facility] Internal Medicine  Marcelo Sutton MD; Parmjit Parry MD; Jaziel Cerda MD; MD Jessika Diego MD; Alexia Tobar MD  [facility] Internal Medicine   IN-PATIENT SERVICE  [facility]                 Date:   6/26/2024  Patientname:  Frank Lisa  Date of admission:  6/25/2024  7:27 PM  MRN:   [id number]  Account:  [id number]  YOB: 1960  PCP:    Lopez Rosario PA  Room:   2105/2105-01  Code Status:    Full Code      Chief Complaint:     Chief Complaint   Patient presents with    Abdominal Pain    Rectal Bleeding       History of Present Illness:     Frank Lisa is a 63 y.o. Non- / non  male who presents with Abdominal Pain and Rectal Bleeding and is admitted to the hospital for the management of Acute pancreatitis without infection or necrosis. Medical history significant for atrial fibrillation, COPD, alcoholic liver cirrhosis s/p TIPS procedure, Hepatitis C, hypomagnesia, Hx of GI bleed, Hx of esophageal cancer in remission.  Presented to ED complaining of abdominal pain and episode of black tarry stool.  Endorses nausea without vomiting.  Transaminitis with alk phos 204, AST 81, ALT 15, total bilirubin 3.6, lipase 157.  CT abdomen reveals no acute abnormality. No leukocytosis WBC 6.9.  Hemoglobin stable at 10.8 with chronic anemia.  PT 17.4, INR 1.4. Patent does follow outpatient with GI. On 5/10/24 he had an EGD that revealed esophagitis and gastritis with no mention of esophageal varices. He also had a G-tube removed due to it no longer being utilized. Ongoing hypomagnesia, current magnesium level 1.2. Troponin 12 repeat 10.  Denies chest pain.  No EKG changes. Denies fever, chills, chest pain, cough, diarrhea, and urinary symptoms.Symptoms are reported as acute, constant and moderate in severity..     Past Medical History:     Past Medical History:   Diagnosis Date    Abnormal EKG     Acute deep vein thrombosis (DVT) of  anemia  -IV protonix  -Hold VTE   -Consult GI  -NPO  -Pain and Nausea control    Full code       MASSIMO MIDDLETON - NP  6/26/2024  1:41 AM      Please note that this chart was generated using voice recognition Dragon dictation software.  Although every effort was made to ensure the accuracy of this automated transcription, some errors in transcription [place] [address].   Phone [phone]

## 2024-06-27 ENCOUNTER — TELEPHONE (OUTPATIENT)
Dept: PRIMARY CARE CLINIC | Age: 64
End: 2024-06-27

## 2024-06-27 ENCOUNTER — CARE COORDINATION (OUTPATIENT)
Dept: CARE COORDINATION | Age: 64
End: 2024-06-27

## 2024-06-27 DIAGNOSIS — K85.90 ACUTE PANCREATITIS WITHOUT INFECTION OR NECROSIS, UNSPECIFIED PANCREATITIS TYPE: Primary | ICD-10-CM

## 2024-06-27 PROCEDURE — 1111F DSCHRG MED/CURRENT MED MERGE: CPT | Performed by: PHYSICIAN ASSISTANT

## 2024-06-27 NOTE — CARE COORDINATION
Care Transitions Note    Initial Call - Call within 2 business days of discharge: Yes    Patient Current Location:  Home: [address] [place] [address]    Care Transition Nurse contacted the patient by telephone to perform post hospital discharge assessment, verified name and  as identifiers. Provided introduction to self, and explanation of the Care Transition Nurse role.        Patient: Frank Lisa                                  Patient : 1960   MRN: [id number]                               Reason for Admission: ABD pain and dark tarry stools, possible pancreatitis/recent EGD showed esophagitis/gastritis  Discharge Date: 24         RURS: Readmission Risk Score: 41.9     [facility] [place] - admission for ABD pain and dark tarry stools - hgb stable 10.8,  Suspected pancreatitis or esophagitis/gastritis as per recent EGD.  Discharged home with Ellacehlle Dillard and protonix + carafate         Last Discharge Facility       Date Complaint Diagnosis Description Type Department Provider    24 Abdominal Pain; Rectal Bleeding Gastrointestinal hemorrhage, unspecified gastrointestinal hemorrhage type ... ED to Hosp-Admission (Discharged) (ADMITTED) Jessika Lloyd MD; Alexis Casillas...            Was this an external facility discharge? No    Additional needs identified to be addressed with provider   CTN scheduled HFU appt for              Method of communication with provider: none.    Patients top risk factors for readmission: medical condition-pancreatitis, ascites hx, ABD pain - gastritis/esophagitis    Interventions to address risk factors:   Review of patient management of conditions/medications: Appt set up    Care Summary Note:   [facility] [place] - admission for ABD pain and dark tarry stools - hgb stable 10.8,  Suspected pancreatitis or esophagitis/gastritis as per recent EGD.  Discharged home with Ellachelle Dillard and protonix + carafate    Frank returned my call from his updated, new

## 2024-06-27 NOTE — TELEPHONE ENCOUNTER
Andres from [facility] called and stated that this is the 2nd week of missed visits with the patient. He states he bams on the patients door and no one answers. He calls and no one answers the phone. He has talked to the patients sister to verify the phone number.     Pt will be discharged from the services that [facility] were to provide for him.

## 2024-06-27 NOTE — CARE COORDINATION
Care Transitions Note    Initial Call Attempt #1 - Call within 2 business days of discharge: Yes    Attempted to reach patient for transitions of care follow up. Unable to reach patient.    Outreach Attempts:   HIPAA compliant voicemail left for patient which is also the same listed # for other contact, Nilam, ex-wife.  Other contact # listed for patient indicates # \"not reachable\" - no VM option     Patient: Frank Lisa      Patient : 1960   MRN: [id number]      Reason for Admission: ABD pain and dark tarry stools, possible pancreatitis/recent EGD showed esophagitis/gastritis  Discharge Date: 24    RURS: Readmission Risk Score: 41.9    [facility] - admission for ABD pain and dark tarry stools - hgb stable 10.8,  Suspected pancreatitis or esophagitis/gastritis as per recent EGD.  Discharged home with Go Dillard and protonix + carafate    Last Discharge Facility       Date Complaint Diagnosis Description Type Department Provider    24 Abdominal Pain; Rectal Bleeding Gastrointestinal hemorrhage, unspecified gastrointestinal hemorrhage type ... ED to Hosp-Admission (Discharged) (ADMITTED) Jesiska Lloyd MD; Alexis Casillas...            Was this an external facility discharge? No    Follow Up Appointment:   Patient does not have a follow up appointment scheduled at time of call.  Routed to PCP clinical pool for HFU      Plan for follow-up on next business day.   Plan for reattempt #2 for      Grace Gaffney RN

## 2024-06-28 ENCOUNTER — ANESTHESIA EVENT (OUTPATIENT)
Dept: OPERATING ROOM | Age: 64
End: 2024-06-28
Payer: COMMERCIAL

## 2024-06-28 NOTE — PRE-PROCEDURE INSTRUCTIONS
No answer, left message ?  -YES                           Unable to leave message ?    When were you told to arrive at hospital ?  -1015    Do you have a  ?    Are you on any blood thinners ?                     If yes when did you stop taking ?    Do you have your prep Rx filled and instruction ?      Nothing to eat the day before , only clear liquids.    Are you experiencing any covid symptoms ?     Do you have any infections or rash we should be aware of ?      Do you have the Hibiclens soap to use the night before and the morning of surgery ?    Nothing to eat or drink after midnight, only a sip of water to take any medication instructed to take the night before.  Wear comfortable clothing, leave any valuables at home, remove any jewelry and body piercing .

## 2024-06-29 ASSESSMENT — ENCOUNTER SYMPTOMS
VOMITING: 0
ABDOMINAL PAIN: 1
NAUSEA: 0
SHORTNESS OF BREATH: 0

## 2024-07-01 ENCOUNTER — HOSPITAL ENCOUNTER (OUTPATIENT)
Age: 64
Setting detail: OUTPATIENT SURGERY
Discharge: HOME | End: 2024-07-01
Attending: UROLOGY | Admitting: UROLOGY
Payer: COMMERCIAL

## 2024-07-01 ENCOUNTER — ANESTHESIA (OUTPATIENT)
Dept: OPERATING ROOM | Age: 64
End: 2024-07-01
Payer: COMMERCIAL

## 2024-07-01 VITALS
HEIGHT: 69 IN | DIASTOLIC BLOOD PRESSURE: 60 MMHG | TEMPERATURE: 97.5 F | SYSTOLIC BLOOD PRESSURE: 144 MMHG | RESPIRATION RATE: 20 BRPM | HEART RATE: 60 BPM | OXYGEN SATURATION: 97 % | BODY MASS INDEX: 25.92 KG/M2 | WEIGHT: 175 LBS

## 2024-07-01 DIAGNOSIS — R31.0 GROSS HEMATURIA: ICD-10-CM

## 2024-07-01 LAB — POTASSIUM SERPL-SCNC: 3.8 MMOL/L (ref 3.7–5.3)

## 2024-07-01 PROCEDURE — 7100000001 HC PACU RECOVERY - ADDTL 15 MIN: Performed by: UROLOGY

## 2024-07-01 PROCEDURE — 3600000002 HC SURGERY LEVEL 2 BASE: Performed by: UROLOGY

## 2024-07-01 PROCEDURE — 3700000001 HC ADD 15 MINUTES (ANESTHESIA): Performed by: UROLOGY

## 2024-07-01 PROCEDURE — 88112 CYTOPATH CELL ENHANCE TECH: CPT

## 2024-07-01 PROCEDURE — 84132 ASSAY OF SERUM POTASSIUM: CPT

## 2024-07-01 PROCEDURE — 7100000000 HC PACU RECOVERY - FIRST 15 MIN: Performed by: UROLOGY

## 2024-07-01 PROCEDURE — 6370000000 HC RX 637 (ALT 250 FOR IP): Performed by: UROLOGY

## 2024-07-01 PROCEDURE — 7100000031 HC ASPR PHASE II RECOVERY - ADDTL 15 MIN: Performed by: UROLOGY

## 2024-07-01 PROCEDURE — 6360000002 HC RX W HCPCS: Performed by: UROLOGY

## 2024-07-01 PROCEDURE — 3600000012 HC SURGERY LEVEL 2 ADDTL 15MIN: Performed by: UROLOGY

## 2024-07-01 PROCEDURE — 2500000003 HC RX 250 WO HCPCS: Performed by: NURSE ANESTHETIST, CERTIFIED REGISTERED

## 2024-07-01 PROCEDURE — 7100000011 HC PHASE II RECOVERY - ADDTL 15 MIN: Performed by: UROLOGY

## 2024-07-01 PROCEDURE — 3700000000 HC ANESTHESIA ATTENDED CARE: Performed by: UROLOGY

## 2024-07-01 PROCEDURE — 6360000002 HC RX W HCPCS: Performed by: NURSE ANESTHETIST, CERTIFIED REGISTERED

## 2024-07-01 PROCEDURE — 6360000002 HC RX W HCPCS: Performed by: ANESTHESIOLOGY

## 2024-07-01 PROCEDURE — 7100000010 HC PHASE II RECOVERY - FIRST 15 MIN: Performed by: UROLOGY

## 2024-07-01 PROCEDURE — 2580000003 HC RX 258: Performed by: ANESTHESIOLOGY

## 2024-07-01 PROCEDURE — 7100000030 HC ASPR PHASE II RECOVERY - FIRST 15 MIN: Performed by: UROLOGY

## 2024-07-01 PROCEDURE — 36415 COLL VENOUS BLD VENIPUNCTURE: CPT

## 2024-07-01 PROCEDURE — 2709999900 HC NON-CHARGEABLE SUPPLY: Performed by: UROLOGY

## 2024-07-01 RX ORDER — ONDANSETRON 2 MG/ML
4 INJECTION INTRAMUSCULAR; INTRAVENOUS
Status: DISCONTINUED | OUTPATIENT
Start: 2024-07-01 | End: 2024-07-01 | Stop reason: HOSPADM

## 2024-07-01 RX ORDER — PROPOFOL 10 MG/ML
INJECTION, EMULSION INTRAVENOUS PRN
Status: DISCONTINUED | OUTPATIENT
Start: 2024-07-01 | End: 2024-07-01 | Stop reason: SDUPTHER

## 2024-07-01 RX ORDER — SODIUM CHLORIDE 0.9 % (FLUSH) 0.9 %
5-40 SYRINGE (ML) INJECTION EVERY 12 HOURS SCHEDULED
Status: DISCONTINUED | OUTPATIENT
Start: 2024-07-01 | End: 2024-07-01 | Stop reason: HOSPADM

## 2024-07-01 RX ORDER — SODIUM CHLORIDE 0.9 % (FLUSH) 0.9 %
5-40 SYRINGE (ML) INJECTION PRN
Status: DISCONTINUED | OUTPATIENT
Start: 2024-07-01 | End: 2024-07-01 | Stop reason: HOSPADM

## 2024-07-01 RX ORDER — SODIUM CHLORIDE 9 MG/ML
INJECTION, SOLUTION INTRAVENOUS PRN
Status: DISCONTINUED | OUTPATIENT
Start: 2024-07-01 | End: 2024-07-01 | Stop reason: HOSPADM

## 2024-07-01 RX ORDER — FENTANYL CITRATE 0.05 MG/ML
25 INJECTION, SOLUTION INTRAMUSCULAR; INTRAVENOUS EVERY 5 MIN PRN
Status: DISCONTINUED | OUTPATIENT
Start: 2024-07-01 | End: 2024-07-01 | Stop reason: HOSPADM

## 2024-07-01 RX ORDER — MIDAZOLAM HYDROCHLORIDE 1 MG/ML
INJECTION INTRAMUSCULAR; INTRAVENOUS PRN
Status: DISCONTINUED | OUTPATIENT
Start: 2024-07-01 | End: 2024-07-01 | Stop reason: SDUPTHER

## 2024-07-01 RX ORDER — METOCLOPRAMIDE HYDROCHLORIDE 5 MG/ML
10 INJECTION INTRAMUSCULAR; INTRAVENOUS
Status: DISCONTINUED | OUTPATIENT
Start: 2024-07-01 | End: 2024-07-01 | Stop reason: HOSPADM

## 2024-07-01 RX ORDER — NALOXONE HYDROCHLORIDE 0.4 MG/ML
INJECTION, SOLUTION INTRAMUSCULAR; INTRAVENOUS; SUBCUTANEOUS PRN
Status: DISCONTINUED | OUTPATIENT
Start: 2024-07-01 | End: 2024-07-01 | Stop reason: HOSPADM

## 2024-07-01 RX ORDER — LIDOCAINE HYDROCHLORIDE 20 MG/ML
INJECTION, SOLUTION INFILTRATION; PERINEURAL PRN
Status: DISCONTINUED | OUTPATIENT
Start: 2024-07-01 | End: 2024-07-01 | Stop reason: SDUPTHER

## 2024-07-01 RX ORDER — ONDANSETRON 2 MG/ML
4 INJECTION INTRAMUSCULAR; INTRAVENOUS ONCE
Status: COMPLETED | OUTPATIENT
Start: 2024-07-01 | End: 2024-07-01

## 2024-07-01 RX ORDER — LIDOCAINE HYDROCHLORIDE 20 MG/ML
JELLY TOPICAL PRN
Status: DISCONTINUED | OUTPATIENT
Start: 2024-07-01 | End: 2024-07-01 | Stop reason: ALTCHOICE

## 2024-07-01 RX ORDER — DIPHENHYDRAMINE HYDROCHLORIDE 50 MG/ML
12.5 INJECTION INTRAMUSCULAR; INTRAVENOUS
Status: DISCONTINUED | OUTPATIENT
Start: 2024-07-01 | End: 2024-07-01 | Stop reason: HOSPADM

## 2024-07-01 RX ADMIN — PROPOFOL 20 MG: 10 INJECTION, EMULSION INTRAVENOUS at 12:25

## 2024-07-01 RX ADMIN — Medication 2000 MG: at 12:13

## 2024-07-01 RX ADMIN — MIDAZOLAM 2 MG: 1 INJECTION INTRAMUSCULAR; INTRAVENOUS at 12:13

## 2024-07-01 RX ADMIN — PROPOFOL 20 MG: 10 INJECTION, EMULSION INTRAVENOUS at 12:23

## 2024-07-01 RX ADMIN — ONDANSETRON 4 MG: 2 INJECTION INTRAMUSCULAR; INTRAVENOUS at 12:07

## 2024-07-01 RX ADMIN — PROPOFOL 50 MG: 10 INJECTION, EMULSION INTRAVENOUS at 12:19

## 2024-07-01 RX ADMIN — PROPOFOL 20 MG: 10 INJECTION, EMULSION INTRAVENOUS at 12:21

## 2024-07-01 RX ADMIN — SODIUM CHLORIDE: 9 INJECTION, SOLUTION INTRAVENOUS at 11:56

## 2024-07-01 RX ADMIN — LIDOCAINE HYDROCHLORIDE 80 MG: 20 INJECTION, SOLUTION INFILTRATION; PERINEURAL at 12:19

## 2024-07-01 ASSESSMENT — ENCOUNTER SYMPTOMS
SORE THROAT: 0
SHORTNESS OF BREATH: 1
ABDOMINAL PAIN: 1
COUGH: 0
BACK PAIN: 1
TROUBLE SWALLOWING: 1
APNEA: 0
SHORTNESS OF BREATH: 1

## 2024-07-01 ASSESSMENT — PAIN - FUNCTIONAL ASSESSMENT
PAIN_FUNCTIONAL_ASSESSMENT: 0-10
PAIN_FUNCTIONAL_ASSESSMENT: NONE - DENIES PAIN
PAIN_FUNCTIONAL_ASSESSMENT: NONE - DENIES PAIN

## 2024-07-01 NOTE — ANESTHESIA POSTPROCEDURE EVALUATION
Department of Anesthesiology  Postprocedure Note    Patient: Frank Lisa  MRN: [id number]  YOB: 1960  Date of evaluation: 7/1/2024    Procedure Summary       Date: 07/01/24 Room / Location: [place] [address] [facility]    Anesthesia Start: 1213 Anesthesia Stop: 1235    Procedure: CYSTOSCOPY (Bladder) Diagnosis:       Gross hematuria      (Gross hematuria [R31.0])    Surgeons: Edinson Griffith MD Responsible Provider: Kyle Sanders MD    Anesthesia Type: General ASA Status: 3            Anesthesia Type: General    Yen Phase I: Yen Score: 10    Yen Phase II: Yen Score: 10    Anesthesia Post Evaluation    Patient location during evaluation: PACU  Patient participation: complete - patient participated  Level of consciousness: awake and alert  Airway patency: patent  Nausea & Vomiting: no vomiting  Cardiovascular status: hemodynamically stable  Respiratory status: acceptable  Hydration status: euvolemic  Comments: POST- ANESTHESIA EVALUATION       Pt Name: Frank Lisa  MRN: [id number]  YOB: 1960  Date of evaluation: 7/1/2024  Time:  2:53 PM      BP (!) 144/60   Pulse 60   Temp 97.5 °F (36.4 °C) (Infrared)   Resp 20   Ht 1.762 m (5' 9.37\")   Wt 79.4 kg (175 lb)   SpO2 97%   BMI 25.57 kg/m²      Consciousness Level  Awake  Cardiopulmonary Status  Stable  Pain Adequately Treated YES  Nausea / Vomiting  NO  Adequate Hydration  YES  Anesthesia Related Complications NONE      Electronically signed by Kyle Sanders MD on 7/1/2024 at 2:53 PM         Pain management: satisfactory to patient    No notable events documented.

## 2024-07-01 NOTE — ANESTHESIA PRE PROCEDURE
°C)   TempSrc: Infrared   SpO2: 99%   Weight: 79.4 kg (175 lb)   Height: 1.762 m (5' 9.37\")                                              BP Readings from Last 3 Encounters:   07/01/24 (!) 150/74   06/26/24 (!) 144/66   06/13/24 108/62       NPO Status:                                                                                 BMI:   Wt Readings from Last 3 Encounters:   07/01/24 79.4 kg (175 lb)   06/26/24 79.4 kg (175 lb)   06/18/24 78.5 kg (173 lb)     Body mass index is 25.57 kg/m².    CBC:   Lab Results   Component Value Date/Time    WBC 7.2 06/26/2024 05:00 AM    RBC 3.17 06/26/2024 05:00 AM    RBC 4.75 04/19/2012 11:30 AM    HGB 9.8 06/26/2024 05:00 AM    HCT 29.3 06/26/2024 05:00 AM    MCV 92.5 06/26/2024 05:00 AM    RDW 15.9 06/26/2024 05:00 AM    PLT 64 06/26/2024 05:00 AM     04/19/2012 11:30 AM       CMP:   Lab Results   Component Value Date/Time     06/26/2024 05:00 AM    K 3.1 06/26/2024 05:00 AM    CL 99 06/26/2024 05:00 AM    CO2 25 06/26/2024 05:00 AM    BUN 11 06/26/2024 05:00 AM    CREATININE 0.8 06/26/2024 05:00 AM    GFRAA >60 09/28/2022 01:33 PM    LABGLOM >90 06/26/2024 05:00 AM    LABGLOM >90 04/28/2024 03:13 PM    GLUCOSE 105 06/26/2024 05:00 AM    GLUCOSE 71 04/21/2023 04:18 AM    PROT 7.7 04/19/2012 11:30 AM    CALCIUM 8.3 06/26/2024 05:00 AM    BILITOT 3.1 06/26/2024 05:00 AM    ALKPHOS 182 06/26/2024 05:00 AM    AST 72 06/26/2024 05:00 AM    ALT 15 06/26/2024 05:00 AM       POC Tests: No results for input(s): \"POCGLU\", \"POCNA\", \"POCK\", \"POCCL\", \"POCBUN\", \"POCHEMO\", \"POCHCT\" in the last 72 hours.    Coags:   Lab Results   Component Value Date/Time    PROTIME 17.4 06/25/2024 08:35 PM    INR 1.4 06/25/2024 08:35 PM    APTT 28.6 05/23/2024 12:58 PM       HCG (If Applicable): No results found for: \"PREGTESTUR\", \"PREGSERUM\", \"HCG\", \"HCGQUANT\"     ABGs:   Lab Results   Component Value Date/Time    PHART 7.310 03/25/2023 11:30 PM    PO2ART 336.0 03/25/2023 11:30 PM    [id number]

## 2024-07-01 NOTE — DISCHARGE INSTRUCTIONS
Call for follow-up  Tylenol as needed for pain    DISCHARGE INSTRUCTIONS FOR CYSTOSCOPY    In order to continue your care at home, please follow the instructions below.    For General Anesthesia  Do not drink any alcoholic beverages or make any legal or important decisions for 24 hours.   No driving or operating machinery for 24 hours.     Diet    Drink plenty of fluids after surgery, unless you are on a fluid restriction.  After general anesthesia, start out eating lightly (broth, soup, crackers, toast, etc.) advancing as tolerated to your usual diet.  Try to avoid spicy or greasy/fatty foods for 24 hours.  Avoid milk products for several hours.    Medications  Take medications as instructed by your surgeon.   Please do not take prescribed pain medication with alcoholic beverages.  Do not drive or operate machinery while taking any prescribed pain medication.    Activities  As instructed by your surgeon.  Limit your activities for 24 hours.  Avoid heavy work or sports until surgeon approves.  No lifting, pushing, pulling, straining until surgeon approves.  You may shower.    Surgery Area or Examination Site  After the cystoscopy, your urethra may be sore at first, and it may burn when you urinate for the first few days after the procedure.  You may feel the need to urinate more often, and your urine may be pink.   These symptoms should get better in 1 or 2 days.     Call your surgeon for the following:  You have pain that does not get better after you take pain medicine.   For an oral temperature (by mouth) is 101 degrees or higher, chills, or excessive sweating.  Persistent nausea or vomiting and can’t keep fluids down.  You have trouble passing urine or stool, especially if you have pain or swelling in your lower belly.   If you are unable to urinate within 8 hours of surgery.  Your urine is still red or you see blood clots after you have urinated several times.  Your urine gets cloudy or smells bad.  You have

## 2024-07-01 NOTE — H&P
adenocarcinoma (HCC)     Low hemoglobin 12/20/2021    Anemia 12/20/2021    Hypotension 12/20/2021    Former smoker, 50+ pack years, quit 2016 12/20/2021    HLD (hyperlipidemia) 12/20/2021    Abnormal findings on diagnostic imaging of spine 12/14/2021    Foraminal stenosis of cervical region 12/14/2021    Spinal stenosis of lumbar region with neurogenic claudication 12/14/2021    Severe comorbid illness 11/30/2021    Multifactorial gait disorder 11/30/2021    Current smoker 04/05/2021    COVID-19 02/23/2021    Anxiety 02/23/2021    Malignant neoplasm of lower third of esophagus (HCC)     Hypocalcemia 12/26/2020    Hypophosphatemia 12/26/2020    Alcohol abuse     Altered mental status     Thrombocytopenia (HCC) 12/23/2020    Hepatitis C virus infection resolved after antiviral drug therapy 12/23/2020    Cervical facet syndrome 11/23/2020    Lumbar facet arthropathy 08/17/2020    Elevated LFTs 08/12/2020    Seasonal allergies 08/12/2020    S/P epidural steroid injection 08/05/2020    Essential hypertension 04/24/2019    Recurrent major depressive disorder in partial remission (HCC) 04/24/2019    Pure hypercholesterolemia 02/04/2019    Hypokalemia 02/04/2019    Vitamin D deficiency 09/20/2017    History of hepatitis C 09/11/2017    Ganglion cyst 05/31/2017    Carpal tunnel syndrome of right wrist 05/31/2017    Tinnitus 03/23/2017    Eustachian tube dysfunction 03/23/2017    Lumbar disc herniation 11/08/2016    Gynecomastia, male 10/26/2016    Depression 10/13/2016    Vertebrogenic low back pain 10/06/2016    DDD (degenerative disc disease), lumbar 10/06/2016    Decompensation of cirrhosis of liver (HCC) 09/15/2016    Psychophysiologic insomnia 09/14/2016    Cirrhosis (HCC)     Hep C w/o coma, chronic (HCC)     Fatty liver     Calculus of gallbladder without cholecystitis 08/10/2016    Chronic viral hepatitis B without delta agent and without coma (HCC) 07/22/2016    Hypomagnesemia     Cervical radicular pain 01/04/2016

## 2024-07-01 NOTE — OP NOTE
Operative Note      Patient: Frank Lisa  YOB: 1960  MRN: [id number]    Date of Procedure: 7/1/2024    Pre-Op Diagnosis Codes:     * Gross hematuria [R31.0]    Post-Op Diagnosis: Same       Procedure(s):  CYSTOSCOPY    Surgeon(s):  Edinson Griffith MD    Assistant:   * No surgical staff found *    Anesthesia: Monitor Anesthesia Care    Estimated Blood Loss (mL): Minimal    Complications: None    Specimens:   ID Type Source Tests Collected by Time Destination   A :  Urine Urine, Cystoscopic CYTOLOGY, NON-GYN Edinson Griffith MD 7/1/2024 1228        Implants:  * No implants in log *      Drains: * No LDAs found *    Findings:  Infection Present At Time Of Surgery (PATOS) (choose all levels that have infection present):  No infection present  Other Findings:   Frank is a 63-year-old male with history of hematuria.  He has a smoking history.  CT urogram was done which was negative.  He is here for cystoscopy, which he preferred to have under anesthesia.    Detailed Description of Procedure:   Patient was brought back to the operating room laid in the operatively supine position.  Once MAC anesthesia was obtained, he was prepped and draped in the usual sterile fashion.  A timeout was performed, he was properly identified, and antibiotics were administered.  10 cc of 2% lidocaine jelly were injected urethra achieve local anesthesia.  The flexible cystoscope was inserted through the urethra into the bladder.  The bladder was visualized in its entirety was unremarkable.  Some trabeculation was noted.  But no tumors or stones were seen.  The anterior bladder the lateral walls on the dome and clip as well as the trigone were examined and were negative.  The scope was retroflexed on itself and the bladder was negative.  The cystoscope was removed the prostate was unremarkable the urethra was unremarkable urine had been collected and sent for cytology.  The scope was removed.  He will for anesthesia and

## 2024-07-02 ENCOUNTER — TELEPHONE (OUTPATIENT)
Dept: PRIMARY CARE CLINIC | Age: 64
End: 2024-07-02

## 2024-07-02 ENCOUNTER — OFFICE VISIT (OUTPATIENT)
Dept: PRIMARY CARE CLINIC | Age: 64
End: 2024-07-02

## 2024-07-02 VITALS
DIASTOLIC BLOOD PRESSURE: 66 MMHG | HEART RATE: 72 BPM | WEIGHT: 177.2 LBS | SYSTOLIC BLOOD PRESSURE: 120 MMHG | BODY MASS INDEX: 26.25 KG/M2 | OXYGEN SATURATION: 99 % | HEIGHT: 69 IN

## 2024-07-02 DIAGNOSIS — Z09 HOSPITAL DISCHARGE FOLLOW-UP: ICD-10-CM

## 2024-07-02 DIAGNOSIS — K92.0 COFFEE GROUND EMESIS: ICD-10-CM

## 2024-07-02 DIAGNOSIS — I10 ESSENTIAL HYPERTENSION: Primary | ICD-10-CM

## 2024-07-02 DIAGNOSIS — K74.60 DECOMPENSATION OF CIRRHOSIS OF LIVER (HCC): ICD-10-CM

## 2024-07-02 DIAGNOSIS — K72.90 DECOMPENSATION OF CIRRHOSIS OF LIVER (HCC): ICD-10-CM

## 2024-07-02 DIAGNOSIS — K22.719 BARRETT'S ESOPHAGUS WITH DYSPLASIA: ICD-10-CM

## 2024-07-02 DIAGNOSIS — I95.9 HYPOTENSION, UNSPECIFIED HYPOTENSION TYPE: ICD-10-CM

## 2024-07-02 DIAGNOSIS — K85.90 ACUTE PANCREATITIS WITHOUT INFECTION OR NECROSIS, UNSPECIFIED PANCREATITIS TYPE: ICD-10-CM

## 2024-07-02 DIAGNOSIS — E83.42 HYPOMAGNESEMIA: ICD-10-CM

## 2024-07-02 DIAGNOSIS — B18.2 CHRONIC HEPATITIS C WITHOUT HEPATIC COMA (HCC): ICD-10-CM

## 2024-07-02 DIAGNOSIS — K21.9 GASTROESOPHAGEAL REFLUX DISEASE WITHOUT ESOPHAGITIS: ICD-10-CM

## 2024-07-02 DIAGNOSIS — K70.30 ALCOHOLIC CIRRHOSIS, UNSPECIFIED WHETHER ASCITES PRESENT (HCC): ICD-10-CM

## 2024-07-02 PROBLEM — K56.609 SMALL BOWEL OBSTRUCTION (HCC): Status: RESOLVED | Noted: 2024-03-31 | Resolved: 2024-07-02

## 2024-07-02 PROBLEM — I82.621 ACUTE DEEP VEIN THROMBOSIS (DVT) OF BRACHIAL VEIN OF RIGHT UPPER EXTREMITY (HCC): Status: RESOLVED | Noted: 2023-01-07 | Resolved: 2024-07-02

## 2024-07-02 PROBLEM — K63.1 BOWEL PERFORATION (HCC): Status: RESOLVED | Noted: 2023-03-25 | Resolved: 2024-07-02

## 2024-07-02 PROBLEM — C15.9 MALIGNANT NEOPLASM OF ESOPHAGUS (HCC): Status: RESOLVED | Noted: 2024-03-27 | Resolved: 2024-07-02

## 2024-07-02 PROBLEM — K56.7 ILEUS (HCC): Status: RESOLVED | Noted: 2023-04-12 | Resolved: 2024-07-02

## 2024-07-02 LAB
CASE NUMBER:: NORMAL
SPECIMEN DESCRIPTION: NORMAL
SURGICAL PATHOLOGY REPORT: NORMAL

## 2024-07-02 RX ORDER — MAGNESIUM OXIDE 400 MG/1
400 TABLET ORAL DAILY
Qty: 30 TABLET | Refills: 1 | Status: SHIPPED | OUTPATIENT
Start: 2024-07-02

## 2024-07-02 NOTE — DISCHARGE SUMMARY
IN-PATIENT SERVICE   [facility] Internal Medicine    Discharge Summary     Patient ID: Frank Lisa  :  1960   MRN: [id number]     ACCOUNT:  [id number]   Patient's PCP: Lopez Rosario PA  Admit Date: 2024   Discharge Date: 2024  Length of Stay: 1  Code Status:  Prior  Admitting Physician: Jessika Abad MD  Discharge Physician: Jessika Abad MD     Active Discharge Diagnoses:     Primary Problem  Acute pancreatitis without infection or necrosis      Hospital Problems  Active Hospital Problems    Diagnosis Date Noted    Acute pancreatitis without infection or necrosis [K85.90] 2024       Admission Condition:  fair     Discharged Condition: fair    Hospital Stay:     Hospital Course:  Frank Lisa is a 63 y.o. male who was admitted for the management of Acute pancreatitis without infection or necrosis , presented to ER with Abdominal Pain and Rectal Bleeding    63-year-old male with atrial fibrillation not on AC due to liver cirrhosis and history of GI bleed,, COPD, alcoholic liver cirrhosis s/p TIPS procedure, Hepatitis C, hypomagnesia, Hx of GI bleed, Hx of esophageal cancer in remission.  Previous bowel perforation and extensive bowel surgery, previous PEG tube in the past 5/10/2024 admitted with abdominal pain rectal bleed  Underwent EGD earlier today which showed severe esophagitis  Placed on Carafate 4 times daily, Protonix 40 mg twice daily per GI      GI bleed without hemodynamic instability-abdominal pain, melena-reflux esophagitis on EGD.  H&H monitored  COPD currently at baseline continue home inhalers  Alcoholic liver cirrhosis status post TIPS procedure currently compensated.   Hypokalemia-replaced per protocol  Concern for acute pancreatitis on admission due to elevated lipase however could be from GI bleed, CT abdomen not concerning for pancreatitis.  Patient was discharged back to facility after EGD.     With instructions to recheck CBC in approximately

## 2024-07-02 NOTE — PROGRESS NOTES
mouth 3 times daily 946 mL 1    metoprolol tartrate (LOPRESSOR) 25 MG tablet Take 0.5 tablets by mouth 2 times daily 60 tablet 3    ferrous sulfate (IRON 325) 325 (65 Fe) MG tablet Take 1 tablet by mouth in the morning and at bedtime      Handicap Placard MISC by Does not apply route Exp: 5-14-26 1 each 0    folic acid (FOLVITE) 1 MG tablet Take 1 tablet by mouth daily 30 tablet 3    DULoxetine (CYMBALTA) 30 MG extended release capsule Take 1 capsule by mouth daily 30 capsule 3        Medications patient taking as of now reconciled against medications ordered at time of hospital discharge: Yes    A comprehensive review of systems was negative except for what was noted in the HPI.    Objective:    /66   Pulse 72   Ht 1.753 m (5' 9\")   Wt 80.4 kg (177 lb 3.2 oz)   SpO2 99%   BMI 26.17 kg/m²   General Appearance: alert and oriented to person, place and time, well developed and well- nourished, in no acute distress  Skin: warm and dry, no rash or erythema  Head: normocephalic and atraumatic  Eyes: pupils equal, round, and reactive to light, extraocular eye movements intact, conjunctivae normal  ENT: tympanic membrane, external ear and ear canal normal bilaterally, nose without deformity, nasal mucosa and turbinates normal without polyps  Neck: supple and non-tender without mass, no thyromegaly or thyroid nodules, no cervical lymphadenopathy  Pulmonary/Chest: clear to auscultation bilaterally- no wheezes, rales or rhonchi, normal air movement, no respiratory distress  Cardiovascular: normal rate, regular rhythm, normal S1 and S2, no murmurs, rubs, clicks, or gallops, distal pulses intact, no carotid bruits  Abdomen: soft, non-tender, non-distended, normal bowel sounds, no masses or organomegaly  Extremities: no cyanosis, clubbing or edema  Musculoskeletal: normal range of motion, no joint swelling, deformity or tenderness  Neurologic: Numbness in hands, Confusion.       An electronic signature was used to

## 2024-07-02 NOTE — TELEPHONE ENCOUNTER
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 He has been unable to contact/reach for the past 4 weeks and not answering the door.

## 2024-07-03 ENCOUNTER — CARE COORDINATION (OUTPATIENT)
Dept: CARE COORDINATION | Age: 64
End: 2024-07-03

## 2024-07-03 NOTE — CARE COORDINATION
Care Transitions Note    Follow Up Call     Attempted to reach patient for transitions of care follow up.  Unable to reach patient.      Outreach Attempts:   HIPAA compliant voicemail left for patient.     Care Summary Note: 1st attempt     Follow Up Appointment:       Plan for follow-up call in 2-5 days based on severity of symptoms and risk factors. Plan for next call: symptom management-check ABD pain and bowels  follow-up appointment-scheduled for cysto 7/1 and HFU 7/2  medication management-check if carafate + protonix are helping         Loretta Jack LPN

## 2024-07-05 ENCOUNTER — CARE COORDINATION (OUTPATIENT)
Dept: CARE COORDINATION | Age: 64
End: 2024-07-05

## 2024-07-05 NOTE — CARE COORDINATION
Care Transitions Note    Follow Up Call     Attempted to reach patient for transitions of care follow up.  Unable to reach patient.      Outreach Attempts:   Multiple attempts to contact patient at phone numbers on file.   HIPAA compliant voicemail left for patient.     Care Summary Note: 2nd attempt-will end care transitions if no return call received,     Follow Up Appointment:   Future Appointments         Provider Specialty Dept Phone    1/7/2025 1:40 PM Edinson Griffith MD Urology [phone]            Plan for follow-up on next business day.  based on severity of symptoms and risk factors.    Loretta Jack LPN

## 2024-07-09 ENCOUNTER — HOSPITAL ENCOUNTER (INPATIENT)
Age: 64
LOS: 5 days | Discharge: HOME | DRG: 354 | End: 2024-07-15
Attending: STUDENT IN AN ORGANIZED HEALTH CARE EDUCATION/TRAINING PROGRAM | Admitting: INTERNAL MEDICINE
Payer: COMMERCIAL

## 2024-07-09 DIAGNOSIS — K56.609 SMALL BOWEL OBSTRUCTION (HCC): Primary | ICD-10-CM

## 2024-07-09 PROCEDURE — 96375 TX/PRO/DX INJ NEW DRUG ADDON: CPT

## 2024-07-09 PROCEDURE — 36415 COLL VENOUS BLD VENIPUNCTURE: CPT

## 2024-07-09 PROCEDURE — 83735 ASSAY OF MAGNESIUM: CPT

## 2024-07-09 PROCEDURE — 83605 ASSAY OF LACTIC ACID: CPT

## 2024-07-09 PROCEDURE — 83690 ASSAY OF LIPASE: CPT

## 2024-07-09 PROCEDURE — 80076 HEPATIC FUNCTION PANEL: CPT

## 2024-07-09 PROCEDURE — 99285 EMERGENCY DEPT VISIT HI MDM: CPT

## 2024-07-09 PROCEDURE — 85025 COMPLETE CBC W/AUTO DIFF WBC: CPT

## 2024-07-09 PROCEDURE — 85610 PROTHROMBIN TIME: CPT

## 2024-07-09 PROCEDURE — 80048 BASIC METABOLIC PNL TOTAL CA: CPT

## 2024-07-09 PROCEDURE — 96374 THER/PROPH/DIAG INJ IV PUSH: CPT

## 2024-07-09 RX ORDER — ONDANSETRON 2 MG/ML
4 INJECTION INTRAMUSCULAR; INTRAVENOUS ONCE
Status: COMPLETED | OUTPATIENT
Start: 2024-07-09 | End: 2024-07-10

## 2024-07-09 RX ORDER — MORPHINE SULFATE 4 MG/ML
4 INJECTION, SOLUTION INTRAMUSCULAR; INTRAVENOUS
Status: COMPLETED | OUTPATIENT
Start: 2024-07-09 | End: 2024-07-10

## 2024-07-09 RX ORDER — 0.9 % SODIUM CHLORIDE 0.9 %
1000 INTRAVENOUS SOLUTION INTRAVENOUS ONCE
Status: COMPLETED | OUTPATIENT
Start: 2024-07-09 | End: 2024-07-10

## 2024-07-09 ASSESSMENT — PAIN - FUNCTIONAL ASSESSMENT: PAIN_FUNCTIONAL_ASSESSMENT: 0-10

## 2024-07-09 ASSESSMENT — PAIN DESCRIPTION - ORIENTATION: ORIENTATION: RIGHT;UPPER

## 2024-07-09 ASSESSMENT — PAIN DESCRIPTION - LOCATION: LOCATION: ABDOMEN

## 2024-07-09 ASSESSMENT — PAIN SCALES - GENERAL: PAINLEVEL_OUTOF10: 10

## 2024-07-10 ENCOUNTER — APPOINTMENT (OUTPATIENT)
Dept: CT IMAGING | Age: 64
DRG: 354 | End: 2024-07-10
Payer: COMMERCIAL

## 2024-07-10 PROBLEM — K56.609 SMALL BOWEL OBSTRUCTION (HCC): Status: ACTIVE | Noted: 2024-07-10

## 2024-07-10 LAB
ABSOLUTE BANDS: 0.07 K/UL (ref 0–1)
ABSOLUTE BANDS: 0.29 K/UL (ref 0–1)
ALBUMIN SERPL-MCNC: 2.5 G/DL (ref 3.5–5.2)
ALP SERPL-CCNC: 155 U/L (ref 40–129)
ALT SERPL-CCNC: 19 U/L (ref 5–41)
ANION GAP SERPL CALCULATED.3IONS-SCNC: 6 MMOL/L (ref 9–17)
ANION GAP SERPL CALCULATED.3IONS-SCNC: 7 MMOL/L (ref 9–17)
AST SERPL-CCNC: 55 U/L
BACTERIA URNS QL MICRO: ABNORMAL
BANDS: 1 % (ref 0–10)
BANDS: 4 % (ref 0–10)
BASOPHILS # BLD: 0 K/UL (ref 0–0.2)
BASOPHILS # BLD: 0 K/UL (ref 0–0.2)
BASOPHILS NFR BLD: 0 % (ref 0–2)
BASOPHILS NFR BLD: 0 % (ref 0–2)
BILIRUB DIRECT SERPL-MCNC: 0.4 MG/DL
BILIRUB INDIRECT SERPL-MCNC: 0.8 MG/DL (ref 0–1)
BILIRUB SERPL-MCNC: 1.2 MG/DL (ref 0.3–1.2)
BILIRUB UR QL STRIP: NEGATIVE
BUN SERPL-MCNC: 8 MG/DL (ref 8–23)
BUN SERPL-MCNC: 8 MG/DL (ref 8–23)
CALCIUM SERPL-MCNC: 8.1 MG/DL (ref 8.6–10.4)
CALCIUM SERPL-MCNC: 8.3 MG/DL (ref 8.6–10.4)
CASTS #/AREA URNS LPF: ABNORMAL /LPF
CHLORIDE SERPL-SCNC: 103 MMOL/L (ref 98–107)
CHLORIDE SERPL-SCNC: 109 MMOL/L (ref 98–107)
CLARITY UR: CLEAR
CO2 SERPL-SCNC: 24 MMOL/L (ref 20–31)
CO2 SERPL-SCNC: 25 MMOL/L (ref 20–31)
COLOR UR: YELLOW
CREAT SERPL-MCNC: 0.9 MG/DL (ref 0.7–1.2)
CREAT SERPL-MCNC: 0.9 MG/DL (ref 0.7–1.2)
EOSINOPHIL # BLD: 0 K/UL (ref 0–0.4)
EOSINOPHIL # BLD: 0.07 K/UL (ref 0–0.4)
EOSINOPHILS RELATIVE PERCENT: 0 % (ref 0–4)
EOSINOPHILS RELATIVE PERCENT: 1 % (ref 0–4)
EPI CELLS #/AREA URNS HPF: ABNORMAL /HPF
ERYTHROCYTE [DISTWIDTH] IN BLOOD BY AUTOMATED COUNT: 16.2 % (ref 11.5–14.9)
ERYTHROCYTE [DISTWIDTH] IN BLOOD BY AUTOMATED COUNT: 16.3 % (ref 11.5–14.9)
GFR, ESTIMATED: >90 ML/MIN/1.73M2
GFR, ESTIMATED: >90 ML/MIN/1.73M2
GLUCOSE SERPL-MCNC: 105 MG/DL (ref 70–99)
GLUCOSE SERPL-MCNC: 119 MG/DL (ref 70–99)
GLUCOSE UR STRIP-MCNC: NEGATIVE MG/DL
HCT VFR BLD AUTO: 29.7 % (ref 41–53)
HCT VFR BLD AUTO: 30 % (ref 41–53)
HGB BLD-MCNC: 9.7 G/DL (ref 13.5–17.5)
HGB BLD-MCNC: 9.8 G/DL (ref 13.5–17.5)
HGB UR QL STRIP.AUTO: ABNORMAL
INR PPP: 1.3
KETONES UR STRIP-MCNC: NEGATIVE MG/DL
LACTATE BLDV-SCNC: 2 MMOL/L (ref 0.5–2.2)
LEUKOCYTE ESTERASE UR QL STRIP: NEGATIVE
LIPASE SERPL-CCNC: 72 U/L (ref 13–60)
LYMPHOCYTES NFR BLD: 0.88 K/UL (ref 1–4.8)
LYMPHOCYTES NFR BLD: 1.66 K/UL (ref 1–4.8)
LYMPHOCYTES RELATIVE PERCENT: 13 % (ref 24–44)
LYMPHOCYTES RELATIVE PERCENT: 23 % (ref 24–44)
MAGNESIUM SERPL-MCNC: 1.6 MG/DL (ref 1.6–2.6)
MCH RBC QN AUTO: 30.8 PG (ref 26–34)
MCH RBC QN AUTO: 30.9 PG (ref 26–34)
MCHC RBC AUTO-ENTMCNC: 32.6 G/DL (ref 31–37)
MCHC RBC AUTO-ENTMCNC: 32.8 G/DL (ref 31–37)
MCV RBC AUTO: 94.2 FL (ref 80–100)
MCV RBC AUTO: 94.5 FL (ref 80–100)
MONOCYTES NFR BLD: 0.72 K/UL (ref 0.1–1.3)
MONOCYTES NFR BLD: 1.16 K/UL (ref 0.1–1.3)
MONOCYTES NFR BLD: 10 % (ref 1–7)
MONOCYTES NFR BLD: 17 % (ref 1–7)
MORPHOLOGY: ABNORMAL
MORPHOLOGY: ABNORMAL
NEUTROPHILS NFR BLD: 63 % (ref 36–66)
NEUTROPHILS NFR BLD: 68 % (ref 36–66)
NEUTS SEG NFR BLD: 4.53 K/UL (ref 1.3–9.1)
NEUTS SEG NFR BLD: 4.62 K/UL (ref 1.3–9.1)
NITRITE UR QL STRIP: NEGATIVE
PH UR STRIP: 7 [PH] (ref 5–8)
PLATELET # BLD AUTO: 215 K/UL (ref 150–450)
PLATELET # BLD AUTO: 235 K/UL (ref 150–450)
PMV BLD AUTO: 7.2 FL (ref 6–12)
PMV BLD AUTO: 7.9 FL (ref 6–12)
POTASSIUM SERPL-SCNC: 3.9 MMOL/L (ref 3.7–5.3)
POTASSIUM SERPL-SCNC: 4.6 MMOL/L (ref 3.7–5.3)
PROT SERPL-MCNC: 5.7 G/DL (ref 6.4–8.3)
PROT UR STRIP-MCNC: NEGATIVE MG/DL
PROTHROMBIN TIME: 16.9 SEC (ref 11.8–14.6)
RBC # BLD AUTO: 3.15 M/UL (ref 4.5–5.9)
RBC # BLD AUTO: 3.17 M/UL (ref 4.5–5.9)
RBC #/AREA URNS HPF: ABNORMAL /HPF
SODIUM SERPL-SCNC: 135 MMOL/L (ref 135–144)
SODIUM SERPL-SCNC: 139 MMOL/L (ref 135–144)
SP GR UR STRIP: 1 (ref 1–1.03)
UROBILINOGEN UR STRIP-ACNC: NORMAL EU/DL (ref 0–1)
WBC #/AREA URNS HPF: ABNORMAL /HPF
WBC OTHER # BLD: 6.8 K/UL (ref 3.5–11)
WBC OTHER # BLD: 7.2 K/UL (ref 3.5–11)

## 2024-07-10 PROCEDURE — 6360000002 HC RX W HCPCS

## 2024-07-10 PROCEDURE — 36415 COLL VENOUS BLD VENIPUNCTURE: CPT

## 2024-07-10 PROCEDURE — 2580000003 HC RX 258

## 2024-07-10 PROCEDURE — 6370000000 HC RX 637 (ALT 250 FOR IP): Performed by: INTERNAL MEDICINE

## 2024-07-10 PROCEDURE — 99223 1ST HOSP IP/OBS HIGH 75: CPT | Performed by: INTERNAL MEDICINE

## 2024-07-10 PROCEDURE — 74177 CT ABD & PELVIS W/CONTRAST: CPT

## 2024-07-10 PROCEDURE — 1200000000 HC SEMI PRIVATE

## 2024-07-10 PROCEDURE — 6360000004 HC RX CONTRAST MEDICATION: Performed by: STUDENT IN AN ORGANIZED HEALTH CARE EDUCATION/TRAINING PROGRAM

## 2024-07-10 PROCEDURE — 81001 URINALYSIS AUTO W/SCOPE: CPT

## 2024-07-10 PROCEDURE — 6370000000 HC RX 637 (ALT 250 FOR IP)

## 2024-07-10 PROCEDURE — 6360000002 HC RX W HCPCS: Performed by: STUDENT IN AN ORGANIZED HEALTH CARE EDUCATION/TRAINING PROGRAM

## 2024-07-10 PROCEDURE — 2580000003 HC RX 258: Performed by: STUDENT IN AN ORGANIZED HEALTH CARE EDUCATION/TRAINING PROGRAM

## 2024-07-10 RX ORDER — LANOLIN ALCOHOL/MO/W.PET/CERES
400 CREAM (GRAM) TOPICAL DAILY
Status: DISCONTINUED | OUTPATIENT
Start: 2024-07-10 | End: 2024-07-15 | Stop reason: HOSPADM

## 2024-07-10 RX ORDER — MAGNESIUM HYDROXIDE/ALUMINUM HYDROXICE/SIMETHICONE 120; 1200; 1200 MG/30ML; MG/30ML; MG/30ML
30 SUSPENSION ORAL EVERY 6 HOURS PRN
Status: DISCONTINUED | OUTPATIENT
Start: 2024-07-10 | End: 2024-07-15 | Stop reason: HOSPADM

## 2024-07-10 RX ORDER — POLYETHYLENE GLYCOL 3350 17 G/17G
17 POWDER, FOR SOLUTION ORAL DAILY PRN
Status: DISCONTINUED | OUTPATIENT
Start: 2024-07-10 | End: 2024-07-15 | Stop reason: HOSPADM

## 2024-07-10 RX ORDER — ONDANSETRON 4 MG/1
4 TABLET, ORALLY DISINTEGRATING ORAL EVERY 8 HOURS PRN
Status: DISCONTINUED | OUTPATIENT
Start: 2024-07-10 | End: 2024-07-15 | Stop reason: HOSPADM

## 2024-07-10 RX ORDER — MAGNESIUM SULFATE HEPTAHYDRATE 40 MG/ML
2000 INJECTION, SOLUTION INTRAVENOUS PRN
Status: DISCONTINUED | OUTPATIENT
Start: 2024-07-10 | End: 2024-07-15 | Stop reason: HOSPADM

## 2024-07-10 RX ORDER — ACETAMINOPHEN 325 MG/1
650 TABLET ORAL EVERY 6 HOURS PRN
Status: DISCONTINUED | OUTPATIENT
Start: 2024-07-10 | End: 2024-07-15 | Stop reason: HOSPADM

## 2024-07-10 RX ORDER — PANTOPRAZOLE SODIUM 40 MG/1
40 TABLET, DELAYED RELEASE ORAL 2 TIMES DAILY
Status: DISCONTINUED | OUTPATIENT
Start: 2024-07-10 | End: 2024-07-15 | Stop reason: HOSPADM

## 2024-07-10 RX ORDER — LACTULOSE 10 G/15ML
30 SOLUTION ORAL 3 TIMES DAILY
Status: DISCONTINUED | OUTPATIENT
Start: 2024-07-10 | End: 2024-07-15 | Stop reason: HOSPADM

## 2024-07-10 RX ORDER — ACETAMINOPHEN 650 MG/1
650 SUPPOSITORY RECTAL EVERY 6 HOURS PRN
Status: DISCONTINUED | OUTPATIENT
Start: 2024-07-10 | End: 2024-07-15 | Stop reason: HOSPADM

## 2024-07-10 RX ORDER — BISACODYL 10 MG
10 SUPPOSITORY, RECTAL RECTAL DAILY PRN
Status: DISCONTINUED | OUTPATIENT
Start: 2024-07-10 | End: 2024-07-15 | Stop reason: HOSPADM

## 2024-07-10 RX ORDER — 0.9 % SODIUM CHLORIDE 0.9 %
100 INTRAVENOUS SOLUTION INTRAVENOUS ONCE
Status: COMPLETED | OUTPATIENT
Start: 2024-07-10 | End: 2024-07-10

## 2024-07-10 RX ORDER — SUCRALFATE 1 G/1
1 TABLET ORAL
Status: DISCONTINUED | OUTPATIENT
Start: 2024-07-10 | End: 2024-07-15 | Stop reason: HOSPADM

## 2024-07-10 RX ORDER — FENTANYL CITRATE 50 UG/ML
100 INJECTION, SOLUTION INTRAMUSCULAR; INTRAVENOUS ONCE
Status: COMPLETED | OUTPATIENT
Start: 2024-07-10 | End: 2024-07-10

## 2024-07-10 RX ORDER — POTASSIUM CHLORIDE 7.45 MG/ML
10 INJECTION INTRAVENOUS PRN
Status: DISCONTINUED | OUTPATIENT
Start: 2024-07-10 | End: 2024-07-15 | Stop reason: HOSPADM

## 2024-07-10 RX ORDER — SODIUM CHLORIDE 9 MG/ML
INJECTION, SOLUTION INTRAVENOUS CONTINUOUS
Status: DISCONTINUED | OUTPATIENT
Start: 2024-07-10 | End: 2024-07-15

## 2024-07-10 RX ORDER — ENOXAPARIN SODIUM 100 MG/ML
40 INJECTION SUBCUTANEOUS DAILY
Status: DISCONTINUED | OUTPATIENT
Start: 2024-07-10 | End: 2024-07-15 | Stop reason: HOSPADM

## 2024-07-10 RX ORDER — SODIUM CHLORIDE 0.9 % (FLUSH) 0.9 %
10 SYRINGE (ML) INJECTION PRN
Status: DISCONTINUED | OUTPATIENT
Start: 2024-07-10 | End: 2024-07-15 | Stop reason: HOSPADM

## 2024-07-10 RX ORDER — SODIUM CHLORIDE 0.9 % (FLUSH) 0.9 %
10 SYRINGE (ML) INJECTION PRN
Status: COMPLETED | OUTPATIENT
Start: 2024-07-10 | End: 2024-07-10

## 2024-07-10 RX ORDER — SODIUM CHLORIDE 0.9 % (FLUSH) 0.9 %
5-40 SYRINGE (ML) INJECTION EVERY 12 HOURS SCHEDULED
Status: DISCONTINUED | OUTPATIENT
Start: 2024-07-10 | End: 2024-07-15 | Stop reason: HOSPADM

## 2024-07-10 RX ORDER — DULOXETIN HYDROCHLORIDE 30 MG/1
30 CAPSULE, DELAYED RELEASE ORAL DAILY
Status: DISCONTINUED | OUTPATIENT
Start: 2024-07-10 | End: 2024-07-15 | Stop reason: HOSPADM

## 2024-07-10 RX ORDER — ONDANSETRON 2 MG/ML
4 INJECTION INTRAMUSCULAR; INTRAVENOUS EVERY 6 HOURS PRN
Status: DISCONTINUED | OUTPATIENT
Start: 2024-07-10 | End: 2024-07-15 | Stop reason: HOSPADM

## 2024-07-10 RX ORDER — MIDODRINE HYDROCHLORIDE 2.5 MG/1
2.5 TABLET ORAL
Status: DISCONTINUED | OUTPATIENT
Start: 2024-07-10 | End: 2024-07-15 | Stop reason: HOSPADM

## 2024-07-10 RX ORDER — POTASSIUM CHLORIDE 20 MEQ/1
40 TABLET, EXTENDED RELEASE ORAL PRN
Status: DISCONTINUED | OUTPATIENT
Start: 2024-07-10 | End: 2024-07-15 | Stop reason: HOSPADM

## 2024-07-10 RX ORDER — FERROUS SULFATE 325(65) MG
325 TABLET ORAL 2 TIMES DAILY
Status: DISCONTINUED | OUTPATIENT
Start: 2024-07-10 | End: 2024-07-12

## 2024-07-10 RX ORDER — LANOLIN ALCOHOL/MO/W.PET/CERES
3 CREAM (GRAM) TOPICAL NIGHTLY PRN
Status: DISCONTINUED | OUTPATIENT
Start: 2024-07-10 | End: 2024-07-15 | Stop reason: HOSPADM

## 2024-07-10 RX ORDER — FOLIC ACID 1 MG/1
1 TABLET ORAL DAILY
Status: DISCONTINUED | OUTPATIENT
Start: 2024-07-10 | End: 2024-07-15 | Stop reason: HOSPADM

## 2024-07-10 RX ORDER — SODIUM CHLORIDE 9 MG/ML
INJECTION, SOLUTION INTRAVENOUS PRN
Status: DISCONTINUED | OUTPATIENT
Start: 2024-07-10 | End: 2024-07-15 | Stop reason: HOSPADM

## 2024-07-10 RX ADMIN — ONDANSETRON 4 MG: 2 INJECTION INTRAMUSCULAR; INTRAVENOUS at 06:18

## 2024-07-10 RX ADMIN — PANTOPRAZOLE SODIUM 40 MG: 40 TABLET, DELAYED RELEASE ORAL at 08:44

## 2024-07-10 RX ADMIN — SODIUM CHLORIDE, PRESERVATIVE FREE 10 ML: 5 INJECTION INTRAVENOUS at 08:45

## 2024-07-10 RX ADMIN — ACETAMINOPHEN 650 MG: 325 TABLET ORAL at 15:20

## 2024-07-10 RX ADMIN — DULOXETINE HYDROCHLORIDE 30 MG: 30 CAPSULE, DELAYED RELEASE ORAL at 08:44

## 2024-07-10 RX ADMIN — SODIUM CHLORIDE: 9 INJECTION, SOLUTION INTRAVENOUS at 06:44

## 2024-07-10 RX ADMIN — ENOXAPARIN SODIUM 40 MG: 100 INJECTION SUBCUTANEOUS at 12:48

## 2024-07-10 RX ADMIN — ONDANSETRON 4 MG: 2 INJECTION INTRAMUSCULAR; INTRAVENOUS at 00:06

## 2024-07-10 RX ADMIN — MIDODRINE HYDROCHLORIDE 2.5 MG: 2.5 TABLET ORAL at 08:44

## 2024-07-10 RX ADMIN — LACTULOSE 30 G: 20 SOLUTION ORAL at 13:27

## 2024-07-10 RX ADMIN — METOPROLOL TARTRATE 12.5 MG: 25 TABLET, FILM COATED ORAL at 19:49

## 2024-07-10 RX ADMIN — IOPAMIDOL 75 ML: 755 INJECTION, SOLUTION INTRAVENOUS at 01:21

## 2024-07-10 RX ADMIN — SODIUM CHLORIDE 100 ML: 9 INJECTION, SOLUTION INTRAVENOUS at 01:21

## 2024-07-10 RX ADMIN — LACTULOSE 30 G: 20 SOLUTION ORAL at 19:49

## 2024-07-10 RX ADMIN — SUCRALFATE 1 G: 1 TABLET ORAL at 19:50

## 2024-07-10 RX ADMIN — MORPHINE SULFATE 4 MG: 4 INJECTION, SOLUTION INTRAMUSCULAR; INTRAVENOUS at 00:05

## 2024-07-10 RX ADMIN — HYDROMORPHONE HYDROCHLORIDE 0.5 MG: 1 INJECTION, SOLUTION INTRAMUSCULAR; INTRAVENOUS; SUBCUTANEOUS at 06:19

## 2024-07-10 RX ADMIN — SUCRALFATE 1 G: 1 TABLET ORAL at 17:35

## 2024-07-10 RX ADMIN — FENTANYL CITRATE 100 MCG: 50 INJECTION INTRAMUSCULAR; INTRAVENOUS at 02:00

## 2024-07-10 RX ADMIN — SUCRALFATE 1 G: 1 TABLET ORAL at 11:24

## 2024-07-10 RX ADMIN — PANTOPRAZOLE SODIUM 40 MG: 40 TABLET, DELAYED RELEASE ORAL at 19:50

## 2024-07-10 RX ADMIN — SUCRALFATE 1 G: 1 TABLET ORAL at 08:44

## 2024-07-10 RX ADMIN — Medication 400 MG: at 08:44

## 2024-07-10 RX ADMIN — FOLIC ACID 1 MG: 1 TABLET ORAL at 08:44

## 2024-07-10 RX ADMIN — ALUMINUM HYDROXIDE, MAGNESIUM HYDROXIDE, AND SIMETHICONE 30 ML: 200; 200; 20 SUSPENSION ORAL at 13:27

## 2024-07-10 RX ADMIN — METOPROLOL TARTRATE 12.5 MG: 25 TABLET, FILM COATED ORAL at 08:44

## 2024-07-10 RX ADMIN — SODIUM CHLORIDE 1000 ML: 9 INJECTION, SOLUTION INTRAVENOUS at 00:06

## 2024-07-10 RX ADMIN — FERROUS SULFATE TAB 325 MG (65 MG ELEMENTAL FE) 325 MG: 325 (65 FE) TAB at 08:44

## 2024-07-10 RX ADMIN — FERROUS SULFATE TAB 325 MG (65 MG ELEMENTAL FE) 325 MG: 325 (65 FE) TAB at 19:49

## 2024-07-10 RX ADMIN — SODIUM CHLORIDE, PRESERVATIVE FREE 10 ML: 5 INJECTION INTRAVENOUS at 01:21

## 2024-07-10 RX ADMIN — MIDODRINE HYDROCHLORIDE 2.5 MG: 2.5 TABLET ORAL at 17:35

## 2024-07-10 ASSESSMENT — PAIN SCALES - GENERAL
PAINLEVEL_OUTOF10: 7
PAINLEVEL_OUTOF10: 10
PAINLEVEL_OUTOF10: 7
PAINLEVEL_OUTOF10: 10
PAINLEVEL_OUTOF10: 10

## 2024-07-10 ASSESSMENT — PAIN DESCRIPTION - LOCATION
LOCATION: ABDOMEN
LOCATION: ABDOMEN

## 2024-07-10 ASSESSMENT — PAIN DESCRIPTION - ORIENTATION
ORIENTATION: RIGHT
ORIENTATION: RIGHT;LEFT;LOWER

## 2024-07-10 ASSESSMENT — PAIN DESCRIPTION - DESCRIPTORS
DESCRIPTORS: ACHING
DESCRIPTORS: SHARP

## 2024-07-10 NOTE — H&P
Nitrite, Urine NEGATIVE NEGATIVE    Leukocyte Esterase, Urine NEGATIVE NEGATIVE   Microscopic Urinalysis    Collection Time: 07/10/24  1:18 AM   Result Value Ref Range    WBC, UA 0 TO 2 (A) 0 TO 5 /HPF    RBC, UA 10 TO 20 (A) 0 TO 2 /HPF    Casts UA 0 TO 2 (A) None /LPF    Epithelial Cells, UA 0 TO 2 /HPF    Bacteria, UA None None   Basic Metabolic Panel w/ Reflex to MG    Collection Time: 07/10/24  6:25 AM   Result Value Ref Range    Sodium 139 135 - 144 mmol/L    Potassium 3.9 3.7 - 5.3 mmol/L    Chloride 109 (H) 98 - 107 mmol/L    CO2 24 20 - 31 mmol/L    Anion Gap 6 (L) 9 - 17 mmol/L    Glucose 105 (H) 70 - 99 mg/dL    BUN 8 8 - 23 mg/dL    Creatinine 0.9 0.7 - 1.2 mg/dL    Est, Glom Filt Rate >90 >60 mL/min/1.73m2    Calcium 8.1 (L) 8.6 - 10.4 mg/dL   CBC with auto differential    Collection Time: 07/10/24  6:25 AM   Result Value Ref Range    WBC 6.8 3.5 - 11.0 k/uL    RBC 3.15 (L) 4.5 - 5.9 m/uL    Hemoglobin 9.7 (L) 13.5 - 17.5 g/dL    Hematocrit 29.7 (L) 41 - 53 %    MCV 94.2 80 - 100 fL    MCH 30.9 26 - 34 pg    MCHC 32.8 31 - 37 g/dL    RDW 16.3 (H) 11.5 - 14.9 %    Platelets 215 150 - 450 k/uL    MPV 7.2 6.0 - 12.0 fL    Neutrophils % 68 (H) 36 - 66 %    Lymphocytes % 13 (L) 24 - 44 %    Monocytes % 17 (H) 1 - 7 %    Eosinophils % 1 0 - 4 %    Basophils % 0 0 - 2 %    Bands 1 0 - 10 %    Neutrophils Absolute 4.62 1.3 - 9.1 k/uL    Lymphocytes Absolute 0.88 (L) 1.0 - 4.8 k/uL    Monocytes Absolute 1.16 0.1 - 1.3 k/uL    Eosinophils Absolute 0.07 0.0 - 0.4 k/uL    Basophils Absolute 0.00 0.0 - 0.2 k/uL    Absolute Bands 0.07 0.0 - 1.0 k/uL    Morphology ANISOCYTOSIS PRESENT        Imaging/Diagnostics:  CT ABDOMEN PELVIS W IV CONTRAST Additional Contrast? None    Result Date: 7/10/2024  1. Partial small bowel obstruction with a portion of the small bowel entering a hernia through the right rectus abdominis muscle in the location of a prior right lower quadrant stoma. 2. Liquid stool in the proximal portion

## 2024-07-10 NOTE — ED NOTES
Report given to Abhi WILCOX, RN from Miryam Forbes RN.   Report method by phone   The following was reviewed with receiving RN:   Current vital signs:  BP (!) 142/66   Pulse 66   Temp 99 °F (37.2 °C) (Oral)   Resp 12   Ht 1.753 m (5' 9\")   Wt 81.6 kg (180 lb)   SpO2 98%   BMI 26.58 kg/m²                      Any medication or safety alerts were reviewed. Any pending diagnostics and notifications were also reviewed, as well as any safety concerns or issues, abnormal labs, abnormal imaging, and abnormal assessment findings. Questions were answered.

## 2024-07-10 NOTE — ED PROVIDER NOTES
EMERGENCY DEPARTMENT ENCOUNTER    Pt Name: Frank Lisa  MRN: [id number]  Birthdate 1960  Date of evaluation: 7/9/24  CHIEF COMPLAINT       Chief Complaint   Patient presents with    Abdominal Pain     HISTORY OF PRESENT ILLNESS   This is a 63-year-old presenting with abdominal pain    He has had a previous colectomy with ostomy then reversal    Right lower quadrant.  Severe.  Nausea no vomiting.  Had bowel movement earlier.  Nonbloody    No fevers chills chest pain cough no urinary symptoms              REVIEW OF SYSTEMS     Review of Systems   Constitutional:  Negative for chills and fever.   HENT:  Negative for rhinorrhea and sore throat.    Eyes:  Negative for discharge and redness.   Respiratory:  Negative for chest tightness and shortness of breath.    Cardiovascular:  Negative for chest pain.   Gastrointestinal:  Positive for abdominal pain and nausea. Negative for diarrhea and vomiting.   Genitourinary:  Negative for dysuria and frequency.   Musculoskeletal:  Negative for arthralgias and myalgias.   Skin:  Negative for rash.   Neurological:  Negative for weakness and numbness.   Psychiatric/Behavioral:  Negative for suicidal ideas.    All other systems reviewed and are negative.    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 Mc, orthopedic, every 3-4 months, gets steroid injection    Lezama esophagus     Bleeding gastric varices 12/29/2022    Bowel perforation (HCC) 03/25/2023    BPH (benign prostatic hypertrophy)     Cholelithiasis     Cirrhosis (HCC)     COVID-19 12/2020    pt reports he had a positive test while at [facility] in

## 2024-07-10 NOTE — ED TRIAGE NOTES
Mode of arrival (squad #, walk in, police, etc) : [facility]        Chief complaint(s): Abdominal pain        Arrival Note (brief scenario, treatment PTA, etc).: c/o RUQ pain, states hx of SBO, states increase pain around 8pm        C= \"Have you ever felt that you should Cut down on your drinking?\"  No  A= \"Have people Annoyed you by criticizing your drinking?\"  No  G= \"Have you ever felt bad or Guilty about your drinking?\"  No  E= \"Have you ever had a drink as an Eye-opener first thing in the morning to steady your nerves or to help a hangover?\"  No      Deferred []      Reason for deferring: N/A    *If yes to two or more: probable alcohol abuse.*

## 2024-07-10 NOTE — CARE COORDINATION
Case Management Assessment  Initial Evaluation    Date/Time of Evaluation: 7/10/2024 11:19 AM  Assessment Completed by: Dianne Jarquin RN    If patient is discharged prior to next notation, then this note serves as note for discharge by case management.    Patient Name: Frank Lisa                   YOB: 1960  Diagnosis: Small bowel obstruction (HCC) [K56.609]                   Date / Time: 7/9/2024 11:25 PM    Patient Admission Status: Inpatient   Readmission Risk (Low < 19, Mod (19-27), High > 27): Readmission Risk Score: 39.3    Current PCP: Lopez Rosario PA  PCP verified by CM? Yes    Chart Reviewed: Yes      History Provided by: Patient  Patient Orientation: Alert and Oriented    Patient Cognition: Alert    Hospitalization in the last 30 days (Readmission):  Yes    If yes, Readmission Assessment in CM Navigator will be completed.    Readmission Assessment  Number of Days since last admission?: 8-30 days  Previous Disposition: Home Alone  Who is being Interviewed: Patient  What was the patient's/caregiver's perception as to why they think they needed to return back to the hospital?: Other (Comment) (Abdominal pain)  Did you visit your Primary Care Physician after you left the hospital, before you returned this time?: Yes  Did you see a specialist, such as Cardiac, Pulmonary, Orthopedic Physician, etc. after you left the hospital?: No  Who advised the patient to return to the hospital?: Self-referral  Does the patient report anything that got in the way of taking their medications?: No  In our efforts to provide the best possible care to you and others like you, can you think of anything that we could have done to help you after you left the hospital the first time, so that you might not have needed to return so soon?: Other (Comment) (no)   Advance Directives:      Code Status: Full Code   Patient's Primary Decision Maker is: Named in Scanned ACP Document    Primary Decision Maker:

## 2024-07-11 ENCOUNTER — ANESTHESIA EVENT (OUTPATIENT)
Dept: OPERATING ROOM | Age: 64
End: 2024-07-11
Payer: COMMERCIAL

## 2024-07-11 LAB
ANION GAP SERPL CALCULATED.3IONS-SCNC: 9 MMOL/L (ref 9–17)
BASOPHILS # BLD: 0.11 K/UL (ref 0–0.2)
BASOPHILS NFR BLD: 2 % (ref 0–2)
BUN SERPL-MCNC: 8 MG/DL (ref 8–23)
CALCIUM SERPL-MCNC: 8.2 MG/DL (ref 8.6–10.4)
CHLORIDE SERPL-SCNC: 107 MMOL/L (ref 98–107)
CO2 SERPL-SCNC: 23 MMOL/L (ref 20–31)
CREAT SERPL-MCNC: 0.8 MG/DL (ref 0.7–1.2)
EOSINOPHIL # BLD: 0.4 K/UL (ref 0–0.4)
EOSINOPHILS RELATIVE PERCENT: 7 % (ref 0–4)
ERYTHROCYTE [DISTWIDTH] IN BLOOD BY AUTOMATED COUNT: 16.6 % (ref 11.5–14.9)
GFR, ESTIMATED: >90 ML/MIN/1.73M2
GLUCOSE SERPL-MCNC: 89 MG/DL (ref 70–99)
HCT VFR BLD AUTO: 31.9 % (ref 41–53)
HGB BLD-MCNC: 10.4 G/DL (ref 13.5–17.5)
LYMPHOCYTES NFR BLD: 0.86 K/UL (ref 1–4.8)
LYMPHOCYTES RELATIVE PERCENT: 15 % (ref 24–44)
MCH RBC QN AUTO: 31.8 PG (ref 26–34)
MCHC RBC AUTO-ENTMCNC: 32.4 G/DL (ref 31–37)
MCV RBC AUTO: 98.1 FL (ref 80–100)
MONOCYTES NFR BLD: 0.97 K/UL (ref 0.1–1.3)
MONOCYTES NFR BLD: 17 % (ref 1–7)
MORPHOLOGY: ABNORMAL
NEUTROPHILS NFR BLD: 59 % (ref 36–66)
NEUTS SEG NFR BLD: 3.36 K/UL (ref 1.3–9.1)
PLATELET # BLD AUTO: 220 K/UL (ref 150–450)
PMV BLD AUTO: 7.7 FL (ref 6–12)
POTASSIUM SERPL-SCNC: 3.9 MMOL/L (ref 3.7–5.3)
RBC # BLD AUTO: 3.26 M/UL (ref 4.5–5.9)
SODIUM SERPL-SCNC: 139 MMOL/L (ref 135–144)
WBC OTHER # BLD: 5.7 K/UL (ref 3.5–11)

## 2024-07-11 PROCEDURE — 85025 COMPLETE CBC W/AUTO DIFF WBC: CPT

## 2024-07-11 PROCEDURE — 80048 BASIC METABOLIC PNL TOTAL CA: CPT

## 2024-07-11 PROCEDURE — 1200000000 HC SEMI PRIVATE

## 2024-07-11 PROCEDURE — 6370000000 HC RX 637 (ALT 250 FOR IP)

## 2024-07-11 PROCEDURE — 6360000002 HC RX W HCPCS

## 2024-07-11 PROCEDURE — 2580000003 HC RX 258

## 2024-07-11 PROCEDURE — 36415 COLL VENOUS BLD VENIPUNCTURE: CPT

## 2024-07-11 PROCEDURE — 99232 SBSQ HOSP IP/OBS MODERATE 35: CPT | Performed by: INTERNAL MEDICINE

## 2024-07-11 RX ADMIN — MIDODRINE HYDROCHLORIDE 2.5 MG: 2.5 TABLET ORAL at 07:52

## 2024-07-11 RX ADMIN — Medication 400 MG: at 09:50

## 2024-07-11 RX ADMIN — HYDROMORPHONE HYDROCHLORIDE 0.5 MG: 1 INJECTION, SOLUTION INTRAMUSCULAR; INTRAVENOUS; SUBCUTANEOUS at 21:45

## 2024-07-11 RX ADMIN — PANTOPRAZOLE SODIUM 40 MG: 40 TABLET, DELAYED RELEASE ORAL at 09:50

## 2024-07-11 RX ADMIN — LACTULOSE 30 G: 20 SOLUTION ORAL at 21:29

## 2024-07-11 RX ADMIN — SODIUM CHLORIDE: 9 INJECTION, SOLUTION INTRAVENOUS at 11:10

## 2024-07-11 RX ADMIN — SUCRALFATE 1 G: 1 TABLET ORAL at 07:50

## 2024-07-11 RX ADMIN — FERROUS SULFATE TAB 325 MG (65 MG ELEMENTAL FE) 325 MG: 325 (65 FE) TAB at 09:50

## 2024-07-11 RX ADMIN — HYDROMORPHONE HYDROCHLORIDE 0.5 MG: 1 INJECTION, SOLUTION INTRAMUSCULAR; INTRAVENOUS; SUBCUTANEOUS at 11:06

## 2024-07-11 RX ADMIN — METOPROLOL TARTRATE 12.5 MG: 25 TABLET, FILM COATED ORAL at 21:31

## 2024-07-11 RX ADMIN — HYDROMORPHONE HYDROCHLORIDE 0.5 MG: 1 INJECTION, SOLUTION INTRAMUSCULAR; INTRAVENOUS; SUBCUTANEOUS at 17:45

## 2024-07-11 RX ADMIN — SUCRALFATE 1 G: 1 TABLET ORAL at 21:31

## 2024-07-11 RX ADMIN — LACTULOSE 30 G: 20 SOLUTION ORAL at 14:53

## 2024-07-11 RX ADMIN — DULOXETINE HYDROCHLORIDE 30 MG: 30 CAPSULE, DELAYED RELEASE ORAL at 09:50

## 2024-07-11 RX ADMIN — SUCRALFATE 1 G: 1 TABLET ORAL at 17:40

## 2024-07-11 RX ADMIN — SUCRALFATE 1 G: 1 TABLET ORAL at 09:50

## 2024-07-11 RX ADMIN — ENOXAPARIN SODIUM 40 MG: 100 INJECTION SUBCUTANEOUS at 09:49

## 2024-07-11 RX ADMIN — FOLIC ACID 1 MG: 1 TABLET ORAL at 09:50

## 2024-07-11 RX ADMIN — SODIUM CHLORIDE, PRESERVATIVE FREE 10 ML: 5 INJECTION INTRAVENOUS at 21:33

## 2024-07-11 RX ADMIN — LACTULOSE 30 G: 20 SOLUTION ORAL at 09:50

## 2024-07-11 RX ADMIN — FERROUS SULFATE TAB 325 MG (65 MG ELEMENTAL FE) 325 MG: 325 (65 FE) TAB at 21:31

## 2024-07-11 RX ADMIN — METOPROLOL TARTRATE 12.5 MG: 25 TABLET, FILM COATED ORAL at 09:53

## 2024-07-11 RX ADMIN — PANTOPRAZOLE SODIUM 40 MG: 40 TABLET, DELAYED RELEASE ORAL at 21:31

## 2024-07-11 ASSESSMENT — PAIN SCALES - GENERAL
PAINLEVEL_OUTOF10: 2
PAINLEVEL_OUTOF10: 7
PAINLEVEL_OUTOF10: 4
PAINLEVEL_OUTOF10: 7
PAINLEVEL_OUTOF10: 8

## 2024-07-11 ASSESSMENT — PAIN DESCRIPTION - LOCATION
LOCATION: ABDOMEN

## 2024-07-11 ASSESSMENT — PAIN DESCRIPTION - FREQUENCY: FREQUENCY: CONTINUOUS

## 2024-07-11 ASSESSMENT — PAIN DESCRIPTION - PAIN TYPE: TYPE: ACUTE PAIN

## 2024-07-11 ASSESSMENT — PAIN DESCRIPTION - ORIENTATION: ORIENTATION: MID;RIGHT;LEFT

## 2024-07-11 ASSESSMENT — PAIN DESCRIPTION - ONSET: ONSET: ON-GOING

## 2024-07-11 ASSESSMENT — ENCOUNTER SYMPTOMS: SHORTNESS OF BREATH: 1

## 2024-07-11 ASSESSMENT — PAIN DESCRIPTION - DESCRIPTORS
DESCRIPTORS: ACHING
DESCRIPTORS: SHARP

## 2024-07-11 NOTE — ANESTHESIA PRE PROCEDURE
Acute kidney failure, unspecified (HCC) N17.9   • Muscle weakness (generalized) M62.81   • Other abnormalities of gait and mobility R26.89   • GI bleed K92.2   • Goals of care, counseling/discussion Z71.89   • ACP (advance care planning) Z71.89   • Palliative care encounter Z51.5   • S/P TIPS (transjugular intrahepatic portosystemic shunt) Z95.828   • Acute metabolic encephalopathy G93.41   • Lezama's esophagus with dysplasia K22.719   • Mastoid disorder, bilateral H74.93   • Acute encephalopathy G93.40   • Chronic hepatitis C without hepatic coma (HCC) B18.2   • Swelling of joint of upper arm, right M25.421   • Anasarca R60.1   • Lightheadedness R42   • Alcohol withdrawal syndrome, with delirium (HCC) F10.931   • Hemorrhage of rectum and anus K62.5   • Electrolyte imbalance E87.8   • Hyponatremia E87.1   • Pain of upper abdomen R10.10   • Moderate malnutrition (HCC) E44.0   • Delirium due to another medical condition F05   • ROBYN (acute kidney injury) (HCC) N17.9   • Ileostomy in place (HCC) Z93.2   • Peristomal dermatitis associated with moisture L30.8   • Cellulitis L03.90   • Coffee ground emesis K92.0   • Substance abuse (HCC) F19.10   • History of abdominal surgery Z98.890   • Status post reversal of ileostomy Z98.890   • Neck pain, chronic M54.2, G89.29   • Peripheral polyneuropathy G62.9   • Ulnar neuropathy of both upper extremities G56.23   • Intractable nausea and vomiting R11.2   • Right lower quadrant abdominal pain R10.31   • Nausea R11.0   • Elevated CEA R97.0   • Abnormal abdominal CT scan R93.5   • Generalized weakness R53.1   • High carcinoembryonic antigen (CEA) R97.0   • Wrist arthritis M19.039   • Pain in joint involving right ankle and foot M25.571   • Syncope R55   • Other microscopic hematuria R31.29   • Dizziness R42   • Acute pancreatitis without infection or necrosis K85.90   • Small bowel obstruction (HCC) K56.609       Past Medical History:        Diagnosis Date   • Abnormal EKG    •

## 2024-07-11 NOTE — CARE COORDINATION
ONGOING DISCHARGE PLAN:    Patient is alert and oriented x4.    Spoke with patient regarding discharge plan and patient confirms that plan is still home.    Surgery consult  Hernia repair on 7/12    Will continue to follow for additional discharge needs.    If patient is discharged prior to next notation, then this note serves as note for discharge by case management.    Electronically signed by Dianne Jarquin RN on 7/11/2024 at 3:45 PM

## 2024-07-12 ENCOUNTER — ANESTHESIA (OUTPATIENT)
Dept: OPERATING ROOM | Age: 64
End: 2024-07-12
Payer: COMMERCIAL

## 2024-07-12 LAB
A1 LECTIN: NEGATIVE
ABO + RH BLD: NORMAL
ANION GAP SERPL CALCULATED.3IONS-SCNC: 10 MMOL/L (ref 9–17)
ARM BAND NUMBER: NORMAL
BASOPHILS # BLD: 0 K/UL (ref 0–0.2)
BASOPHILS NFR BLD: 0 % (ref 0–2)
BLOOD BANK COMMENT: NORMAL
BLOOD BANK SAMPLE EXPIRATION: NORMAL
BLOOD GROUP ANTIBODIES SERPL: NEGATIVE
BUN SERPL-MCNC: 6 MG/DL (ref 8–23)
CALCIUM SERPL-MCNC: 8.1 MG/DL (ref 8.6–10.4)
CHLORIDE SERPL-SCNC: 110 MMOL/L (ref 98–107)
CO2 SERPL-SCNC: 19 MMOL/L (ref 20–31)
CREAT SERPL-MCNC: 0.7 MG/DL (ref 0.7–1.2)
EOSINOPHIL # BLD: 0.28 K/UL (ref 0–0.4)
EOSINOPHILS RELATIVE PERCENT: 5 % (ref 0–4)
ERYTHROCYTE [DISTWIDTH] IN BLOOD BY AUTOMATED COUNT: 15.8 % (ref 11.5–14.9)
GFR, ESTIMATED: >90 ML/MIN/1.73M2
GLUCOSE SERPL-MCNC: 88 MG/DL (ref 70–99)
HCT VFR BLD AUTO: 27.8 % (ref 41–53)
HGB BLD-MCNC: 9.2 G/DL (ref 13.5–17.5)
LYMPHOCYTES NFR BLD: 0.72 K/UL (ref 1–4.8)
LYMPHOCYTES RELATIVE PERCENT: 13 % (ref 24–44)
MCH RBC QN AUTO: 31 PG (ref 26–34)
MCHC RBC AUTO-ENTMCNC: 33.1 G/DL (ref 31–37)
MCV RBC AUTO: 93.4 FL (ref 80–100)
MONOCYTES NFR BLD: 0.83 K/UL (ref 0.1–1.3)
MONOCYTES NFR BLD: 15 % (ref 1–7)
MORPHOLOGY: ABNORMAL
MORPHOLOGY: ABNORMAL
NEUTROPHILS NFR BLD: 67 % (ref 36–66)
NEUTS SEG NFR BLD: 3.67 K/UL (ref 1.3–9.1)
PLATELET # BLD AUTO: 173 K/UL (ref 150–450)
PMV BLD AUTO: 7.9 FL (ref 6–12)
POTASSIUM SERPL-SCNC: 3.9 MMOL/L (ref 3.7–5.3)
RBC # BLD AUTO: 2.98 M/UL (ref 4.5–5.9)
SODIUM SERPL-SCNC: 139 MMOL/L (ref 135–144)
WBC OTHER # BLD: 5.5 K/UL (ref 3.5–11)

## 2024-07-12 PROCEDURE — 6360000002 HC RX W HCPCS

## 2024-07-12 PROCEDURE — 2500000003 HC RX 250 WO HCPCS: Performed by: ANESTHESIOLOGY

## 2024-07-12 PROCEDURE — 2580000003 HC RX 258

## 2024-07-12 PROCEDURE — 2580000003 HC RX 258: Performed by: ANESTHESIOLOGY

## 2024-07-12 PROCEDURE — 0WUF0JZ SUPPLEMENT ABDOMINAL WALL WITH SYNTHETIC SUBSTITUTE, OPEN APPROACH: ICD-10-PCS | Performed by: SURGERY

## 2024-07-12 PROCEDURE — 36415 COLL VENOUS BLD VENIPUNCTURE: CPT

## 2024-07-12 PROCEDURE — 99232 SBSQ HOSP IP/OBS MODERATE 35: CPT | Performed by: INTERNAL MEDICINE

## 2024-07-12 PROCEDURE — 3700000000 HC ANESTHESIA ATTENDED CARE: Performed by: SURGERY

## 2024-07-12 PROCEDURE — 3700000001 HC ADD 15 MINUTES (ANESTHESIA): Performed by: SURGERY

## 2024-07-12 PROCEDURE — 86900 BLOOD TYPING SEROLOGIC ABO: CPT

## 2024-07-12 PROCEDURE — 86901 BLOOD TYPING SEROLOGIC RH(D): CPT

## 2024-07-12 PROCEDURE — 2580000003 HC RX 258: Performed by: SURGERY

## 2024-07-12 PROCEDURE — 6370000000 HC RX 637 (ALT 250 FOR IP): Performed by: ANESTHESIOLOGY

## 2024-07-12 PROCEDURE — 3600000002 HC SURGERY LEVEL 2 BASE: Performed by: SURGERY

## 2024-07-12 PROCEDURE — 2500000003 HC RX 250 WO HCPCS: Performed by: NURSE ANESTHETIST, CERTIFIED REGISTERED

## 2024-07-12 PROCEDURE — C1781 MESH (IMPLANTABLE): HCPCS | Performed by: SURGERY

## 2024-07-12 PROCEDURE — 6360000002 HC RX W HCPCS: Performed by: NURSE ANESTHETIST, CERTIFIED REGISTERED

## 2024-07-12 PROCEDURE — 6360000002 HC RX W HCPCS: Performed by: ANESTHESIOLOGY

## 2024-07-12 PROCEDURE — 86850 RBC ANTIBODY SCREEN: CPT

## 2024-07-12 PROCEDURE — 80048 BASIC METABOLIC PNL TOTAL CA: CPT

## 2024-07-12 PROCEDURE — 7100000001 HC PACU RECOVERY - ADDTL 15 MIN: Performed by: SURGERY

## 2024-07-12 PROCEDURE — 2720000010 HC SURG SUPPLY STERILE: Performed by: SURGERY

## 2024-07-12 PROCEDURE — 6370000000 HC RX 637 (ALT 250 FOR IP)

## 2024-07-12 PROCEDURE — 85025 COMPLETE CBC W/AUTO DIFF WBC: CPT

## 2024-07-12 PROCEDURE — 6360000002 HC RX W HCPCS: Performed by: SURGERY

## 2024-07-12 PROCEDURE — 3600000012 HC SURGERY LEVEL 2 ADDTL 15MIN: Performed by: SURGERY

## 2024-07-12 PROCEDURE — 2709999900 HC NON-CHARGEABLE SUPPLY: Performed by: SURGERY

## 2024-07-12 PROCEDURE — 7100000000 HC PACU RECOVERY - FIRST 15 MIN: Performed by: SURGERY

## 2024-07-12 PROCEDURE — 1200000000 HC SEMI PRIVATE

## 2024-07-12 DEVICE — PHASIX ST MESH, 20 CM X 25 CM (8" X 10"), RECTANGLE
Type: IMPLANTABLE DEVICE | Site: ABDOMEN | Status: FUNCTIONAL
Brand: PHASIX

## 2024-07-12 RX ORDER — SODIUM CHLORIDE 9 MG/ML
INJECTION, SOLUTION INTRAVENOUS PRN
Status: DISCONTINUED | OUTPATIENT
Start: 2024-07-12 | End: 2024-07-12

## 2024-07-12 RX ORDER — SODIUM CHLORIDE 0.9 % (FLUSH) 0.9 %
5-40 SYRINGE (ML) INJECTION PRN
Status: DISCONTINUED | OUTPATIENT
Start: 2024-07-12 | End: 2024-07-12

## 2024-07-12 RX ORDER — FENTANYL CITRATE 50 UG/ML
INJECTION, SOLUTION INTRAMUSCULAR; INTRAVENOUS PRN
Status: DISCONTINUED | OUTPATIENT
Start: 2024-07-12 | End: 2024-07-12 | Stop reason: SDUPTHER

## 2024-07-12 RX ORDER — MIDAZOLAM HYDROCHLORIDE 1 MG/ML
INJECTION INTRAMUSCULAR; INTRAVENOUS PRN
Status: DISCONTINUED | OUTPATIENT
Start: 2024-07-12 | End: 2024-07-12 | Stop reason: SDUPTHER

## 2024-07-12 RX ORDER — METOCLOPRAMIDE HYDROCHLORIDE 5 MG/ML
10 INJECTION INTRAMUSCULAR; INTRAVENOUS EVERY 6 HOURS
Status: DISCONTINUED | OUTPATIENT
Start: 2024-07-12 | End: 2024-07-14

## 2024-07-12 RX ORDER — HYDRALAZINE HYDROCHLORIDE 20 MG/ML
10 INJECTION INTRAMUSCULAR; INTRAVENOUS EVERY 10 MIN PRN
Status: DISCONTINUED | OUTPATIENT
Start: 2024-07-12 | End: 2024-07-12 | Stop reason: HOSPADM

## 2024-07-12 RX ORDER — SUCCINYLCHOLINE/SOD CL,ISO/PF 200MG/10ML
SYRINGE (ML) INTRAVENOUS PRN
Status: DISCONTINUED | OUTPATIENT
Start: 2024-07-12 | End: 2024-07-12 | Stop reason: SDUPTHER

## 2024-07-12 RX ORDER — OXYCODONE HYDROCHLORIDE 5 MG/1
5 TABLET ORAL EVERY 6 HOURS PRN
Status: DISCONTINUED | OUTPATIENT
Start: 2024-07-12 | End: 2024-07-15 | Stop reason: HOSPADM

## 2024-07-12 RX ORDER — SODIUM CHLORIDE, SODIUM LACTATE, POTASSIUM CHLORIDE, CALCIUM CHLORIDE 600; 310; 30; 20 MG/100ML; MG/100ML; MG/100ML; MG/100ML
INJECTION, SOLUTION INTRAVENOUS CONTINUOUS
Status: DISCONTINUED | OUTPATIENT
Start: 2024-07-12 | End: 2024-07-12

## 2024-07-12 RX ORDER — ROCURONIUM BROMIDE 10 MG/ML
INJECTION, SOLUTION INTRAVENOUS PRN
Status: DISCONTINUED | OUTPATIENT
Start: 2024-07-12 | End: 2024-07-12 | Stop reason: SDUPTHER

## 2024-07-12 RX ORDER — GABAPENTIN 300 MG/1
300 CAPSULE ORAL ONCE
Status: DISCONTINUED | OUTPATIENT
Start: 2024-07-12 | End: 2024-07-12

## 2024-07-12 RX ORDER — ONDANSETRON 2 MG/ML
4 INJECTION INTRAMUSCULAR; INTRAVENOUS
Status: DISCONTINUED | OUTPATIENT
Start: 2024-07-12 | End: 2024-07-12 | Stop reason: HOSPADM

## 2024-07-12 RX ORDER — SODIUM CHLORIDE 0.9 % (FLUSH) 0.9 %
5-40 SYRINGE (ML) INJECTION EVERY 12 HOURS SCHEDULED
Status: DISCONTINUED | OUTPATIENT
Start: 2024-07-12 | End: 2024-07-12

## 2024-07-12 RX ORDER — ONDANSETRON 2 MG/ML
4 INJECTION INTRAMUSCULAR; INTRAVENOUS ONCE
Status: COMPLETED | OUTPATIENT
Start: 2024-07-12 | End: 2024-07-12

## 2024-07-12 RX ORDER — LIDOCAINE HYDROCHLORIDE 10 MG/ML
1 INJECTION, SOLUTION EPIDURAL; INFILTRATION; INTRACAUDAL; PERINEURAL
Status: DISCONTINUED | OUTPATIENT
Start: 2024-07-12 | End: 2024-07-12

## 2024-07-12 RX ORDER — PROPOFOL 10 MG/ML
INJECTION, EMULSION INTRAVENOUS PRN
Status: DISCONTINUED | OUTPATIENT
Start: 2024-07-12 | End: 2024-07-12 | Stop reason: SDUPTHER

## 2024-07-12 RX ORDER — DEXAMETHASONE SODIUM PHOSPHATE 4 MG/ML
INJECTION, SOLUTION INTRA-ARTICULAR; INTRALESIONAL; INTRAMUSCULAR; INTRAVENOUS; SOFT TISSUE PRN
Status: DISCONTINUED | OUTPATIENT
Start: 2024-07-12 | End: 2024-07-12 | Stop reason: SDUPTHER

## 2024-07-12 RX ORDER — HYDROMORPHONE HYDROCHLORIDE 2 MG/ML
INJECTION, SOLUTION INTRAMUSCULAR; INTRAVENOUS; SUBCUTANEOUS PRN
Status: DISCONTINUED | OUTPATIENT
Start: 2024-07-12 | End: 2024-07-12 | Stop reason: SDUPTHER

## 2024-07-12 RX ORDER — SODIUM CHLORIDE 0.9 % (FLUSH) 0.9 %
5-40 SYRINGE (ML) INJECTION PRN
Status: DISCONTINUED | OUTPATIENT
Start: 2024-07-12 | End: 2024-07-12 | Stop reason: HOSPADM

## 2024-07-12 RX ORDER — CEFAZOLIN SODIUM 1 G/3ML
INJECTION, POWDER, FOR SOLUTION INTRAMUSCULAR; INTRAVENOUS PRN
Status: DISCONTINUED | OUTPATIENT
Start: 2024-07-12 | End: 2024-07-12 | Stop reason: SDUPTHER

## 2024-07-12 RX ORDER — LIDOCAINE HYDROCHLORIDE 20 MG/ML
INJECTION, SOLUTION EPIDURAL; INFILTRATION; INTRACAUDAL; PERINEURAL PRN
Status: DISCONTINUED | OUTPATIENT
Start: 2024-07-12 | End: 2024-07-12 | Stop reason: SDUPTHER

## 2024-07-12 RX ORDER — SODIUM CHLORIDE 0.9 % (FLUSH) 0.9 %
5-40 SYRINGE (ML) INJECTION EVERY 12 HOURS SCHEDULED
Status: DISCONTINUED | OUTPATIENT
Start: 2024-07-12 | End: 2024-07-12 | Stop reason: HOSPADM

## 2024-07-12 RX ORDER — SCOLOPAMINE TRANSDERMAL SYSTEM 1 MG/1
1 PATCH, EXTENDED RELEASE TRANSDERMAL ONCE
Status: DISCONTINUED | OUTPATIENT
Start: 2024-07-12 | End: 2024-07-12

## 2024-07-12 RX ORDER — SODIUM CHLORIDE 9 MG/ML
INJECTION, SOLUTION INTRAVENOUS PRN
Status: DISCONTINUED | OUTPATIENT
Start: 2024-07-12 | End: 2024-07-12 | Stop reason: HOSPADM

## 2024-07-12 RX ORDER — EPHEDRINE SULFATE/0.9% NACL/PF 25 MG/5 ML
SYRINGE (ML) INTRAVENOUS PRN
Status: DISCONTINUED | OUTPATIENT
Start: 2024-07-12 | End: 2024-07-12 | Stop reason: SDUPTHER

## 2024-07-12 RX ORDER — DIPHENHYDRAMINE HYDROCHLORIDE 50 MG/ML
12.5 INJECTION INTRAMUSCULAR; INTRAVENOUS
Status: DISCONTINUED | OUTPATIENT
Start: 2024-07-12 | End: 2024-07-12 | Stop reason: HOSPADM

## 2024-07-12 RX ADMIN — SUCRALFATE 1 G: 1 TABLET ORAL at 20:10

## 2024-07-12 RX ADMIN — FENTANYL CITRATE 50 MCG: 50 INJECTION, SOLUTION INTRAMUSCULAR; INTRAVENOUS at 11:05

## 2024-07-12 RX ADMIN — METOPROLOL TARTRATE 12.5 MG: 25 TABLET, FILM COATED ORAL at 08:19

## 2024-07-12 RX ADMIN — HYDROMORPHONE HYDROCHLORIDE 0.5 MG: 1 INJECTION, SOLUTION INTRAMUSCULAR; INTRAVENOUS; SUBCUTANEOUS at 04:44

## 2024-07-12 RX ADMIN — Medication 140 MG: at 11:06

## 2024-07-12 RX ADMIN — SODIUM CHLORIDE, PRESERVATIVE FREE 10 ML: 5 INJECTION INTRAVENOUS at 08:21

## 2024-07-12 RX ADMIN — PROPOFOL 200 MG: 10 INJECTION, EMULSION INTRAVENOUS at 11:05

## 2024-07-12 RX ADMIN — HYDROMORPHONE HYDROCHLORIDE 1 MG: 2 INJECTION, SOLUTION INTRAMUSCULAR; INTRAVENOUS; SUBCUTANEOUS at 11:51

## 2024-07-12 RX ADMIN — LIDOCAINE HYDROCHLORIDE 90 MG: 20 INJECTION, SOLUTION EPIDURAL; INFILTRATION; INTRACAUDAL; PERINEURAL at 11:05

## 2024-07-12 RX ADMIN — SUGAMMADEX 200 MG: 100 INJECTION, SOLUTION INTRAVENOUS at 12:23

## 2024-07-12 RX ADMIN — HYDRALAZINE HYDROCHLORIDE 10 MG: 20 INJECTION INTRAMUSCULAR; INTRAVENOUS at 13:04

## 2024-07-12 RX ADMIN — SUCRALFATE 1 G: 1 TABLET ORAL at 18:33

## 2024-07-12 RX ADMIN — EPHEDRINE SULFATE 10 MG: 5 INJECTION INTRAVENOUS at 11:26

## 2024-07-12 RX ADMIN — ONDANSETRON 4 MG: 2 INJECTION INTRAMUSCULAR; INTRAVENOUS at 10:18

## 2024-07-12 RX ADMIN — HYDROMORPHONE HYDROCHLORIDE 0.5 MG: 1 INJECTION, SOLUTION INTRAMUSCULAR; INTRAVENOUS; SUBCUTANEOUS at 23:38

## 2024-07-12 RX ADMIN — METOCLOPRAMIDE 10 MG: 5 INJECTION, SOLUTION INTRAMUSCULAR; INTRAVENOUS at 23:37

## 2024-07-12 RX ADMIN — ROCURONIUM BROMIDE 50 MG: 10 INJECTION, SOLUTION INTRAVENOUS at 11:22

## 2024-07-12 RX ADMIN — METOCLOPRAMIDE 10 MG: 5 INJECTION, SOLUTION INTRAMUSCULAR; INTRAVENOUS at 14:41

## 2024-07-12 RX ADMIN — DEXAMETHASONE SODIUM PHOSPHATE 4 MG: 4 INJECTION INTRA-ARTICULAR; INTRALESIONAL; INTRAMUSCULAR; INTRAVENOUS; SOFT TISSUE at 11:27

## 2024-07-12 RX ADMIN — METOPROLOL TARTRATE 12.5 MG: 25 TABLET, FILM COATED ORAL at 20:03

## 2024-07-12 RX ADMIN — FENTANYL CITRATE 50 MCG: 50 INJECTION, SOLUTION INTRAMUSCULAR; INTRAVENOUS at 11:39

## 2024-07-12 RX ADMIN — HYDROMORPHONE HYDROCHLORIDE 0.5 MG: 1 INJECTION, SOLUTION INTRAMUSCULAR; INTRAVENOUS; SUBCUTANEOUS at 12:51

## 2024-07-12 RX ADMIN — LACTULOSE 30 G: 20 SOLUTION ORAL at 20:02

## 2024-07-12 RX ADMIN — Medication 2000 MG: at 19:46

## 2024-07-12 RX ADMIN — SODIUM CHLORIDE, POTASSIUM CHLORIDE, SODIUM LACTATE AND CALCIUM CHLORIDE: 600; 310; 30; 20 INJECTION, SOLUTION INTRAVENOUS at 10:01

## 2024-07-12 RX ADMIN — FAMOTIDINE 20 MG: 10 INJECTION, SOLUTION INTRAVENOUS at 10:18

## 2024-07-12 RX ADMIN — HYDROMORPHONE HYDROCHLORIDE 0.5 MG: 1 INJECTION, SOLUTION INTRAMUSCULAR; INTRAVENOUS; SUBCUTANEOUS at 21:25

## 2024-07-12 RX ADMIN — SODIUM CHLORIDE, POTASSIUM CHLORIDE, SODIUM LACTATE AND CALCIUM CHLORIDE: 600; 310; 30; 20 INJECTION, SOLUTION INTRAVENOUS at 12:11

## 2024-07-12 RX ADMIN — ACETAMINOPHEN 650 MG: 325 TABLET ORAL at 20:10

## 2024-07-12 RX ADMIN — ROCURONIUM BROMIDE 5 MG: 10 INJECTION, SOLUTION INTRAVENOUS at 11:04

## 2024-07-12 RX ADMIN — HYDROMORPHONE HYDROCHLORIDE 0.5 MG: 1 INJECTION, SOLUTION INTRAMUSCULAR; INTRAVENOUS; SUBCUTANEOUS at 18:41

## 2024-07-12 RX ADMIN — CEFAZOLIN 2 G: 1 INJECTION, POWDER, FOR SOLUTION INTRAMUSCULAR; INTRAVENOUS at 11:15

## 2024-07-12 RX ADMIN — METOCLOPRAMIDE 10 MG: 5 INJECTION, SOLUTION INTRAMUSCULAR; INTRAVENOUS at 18:33

## 2024-07-12 RX ADMIN — CEFAZOLIN 2000 MG: 10 INJECTION, POWDER, FOR SOLUTION INTRAVENOUS at 08:30

## 2024-07-12 RX ADMIN — OXYCODONE HYDROCHLORIDE 5 MG: 5 TABLET ORAL at 14:44

## 2024-07-12 RX ADMIN — OXYCODONE HYDROCHLORIDE 5 MG: 5 TABLET ORAL at 20:10

## 2024-07-12 RX ADMIN — MIDAZOLAM 2 MG: 1 INJECTION INTRAMUSCULAR; INTRAVENOUS at 11:04

## 2024-07-12 RX ADMIN — SODIUM CHLORIDE: 9 INJECTION, SOLUTION INTRAVENOUS at 14:23

## 2024-07-12 RX ADMIN — PANTOPRAZOLE SODIUM 40 MG: 40 TABLET, DELAYED RELEASE ORAL at 20:10

## 2024-07-12 RX ADMIN — HYDROMORPHONE HYDROCHLORIDE 0.5 MG: 1 INJECTION, SOLUTION INTRAMUSCULAR; INTRAVENOUS; SUBCUTANEOUS at 00:48

## 2024-07-12 RX ADMIN — SODIUM CHLORIDE: 9 INJECTION, SOLUTION INTRAVENOUS at 23:45

## 2024-07-12 ASSESSMENT — PAIN DESCRIPTION - LOCATION
LOCATION: ABDOMEN

## 2024-07-12 ASSESSMENT — PAIN SCALES - GENERAL
PAINLEVEL_OUTOF10: 4
PAINLEVEL_OUTOF10: 6
PAINLEVEL_OUTOF10: 6
PAINLEVEL_OUTOF10: 8
PAINLEVEL_OUTOF10: 10
PAINLEVEL_OUTOF10: 10
PAINLEVEL_OUTOF10: 4
PAINLEVEL_OUTOF10: 10
PAINLEVEL_OUTOF10: 2
PAINLEVEL_OUTOF10: 10
PAINLEVEL_OUTOF10: 10
PAINLEVEL_OUTOF10: 8
PAINLEVEL_OUTOF10: 4
PAINLEVEL_OUTOF10: 4
PAINLEVEL_OUTOF10: 8
PAINLEVEL_OUTOF10: 8
PAINLEVEL_OUTOF10: 4

## 2024-07-12 ASSESSMENT — PAIN DESCRIPTION - PAIN TYPE
TYPE: ACUTE PAIN
TYPE: ACUTE PAIN
TYPE: SURGICAL PAIN
TYPE: SURGICAL PAIN
TYPE: ACUTE PAIN
TYPE: ACUTE PAIN

## 2024-07-12 ASSESSMENT — PAIN - FUNCTIONAL ASSESSMENT
PAIN_FUNCTIONAL_ASSESSMENT: ACTIVITIES ARE NOT PREVENTED
PAIN_FUNCTIONAL_ASSESSMENT: 0-10
PAIN_FUNCTIONAL_ASSESSMENT: ACTIVITIES ARE NOT PREVENTED
PAIN_FUNCTIONAL_ASSESSMENT: 0-10
PAIN_FUNCTIONAL_ASSESSMENT: PREVENTS OR INTERFERES SOME ACTIVE ACTIVITIES AND ADLS
PAIN_FUNCTIONAL_ASSESSMENT: ACTIVITIES ARE NOT PREVENTED
PAIN_FUNCTIONAL_ASSESSMENT: PREVENTS OR INTERFERES SOME ACTIVE ACTIVITIES AND ADLS

## 2024-07-12 ASSESSMENT — PAIN DESCRIPTION - DESCRIPTORS
DESCRIPTORS: ACHING;DISCOMFORT
DESCRIPTORS: STABBING;THROBBING;SHARP
DESCRIPTORS: ACHING
DESCRIPTORS: ACHING
DESCRIPTORS: ACHING;SHARP
DESCRIPTORS: ACHING
DESCRIPTORS: ACHING
DESCRIPTORS: ACHING;SHARP
DESCRIPTORS: ACHING
DESCRIPTORS: ACHING
DESCRIPTORS: ACHING;THROBBING

## 2024-07-12 ASSESSMENT — PAIN DESCRIPTION - DIRECTION
RADIATING_TOWARDS: ALL OVER ABDOMEN

## 2024-07-12 ASSESSMENT — PAIN DESCRIPTION - ONSET
ONSET: ON-GOING

## 2024-07-12 ASSESSMENT — PAIN DESCRIPTION - FREQUENCY
FREQUENCY: INTERMITTENT
FREQUENCY: CONTINUOUS

## 2024-07-12 ASSESSMENT — PAIN DESCRIPTION - ORIENTATION
ORIENTATION: MID
ORIENTATION: RIGHT;MID
ORIENTATION: RIGHT;LEFT;MID
ORIENTATION: RIGHT;LEFT;MID
ORIENTATION: MID;RIGHT;LEFT
ORIENTATION: MID
ORIENTATION: MID
ORIENTATION: RIGHT;LEFT;MID

## 2024-07-12 NOTE — CARE COORDINATION
ONGOING DISCHARGE PLAN:    Patient is  sleeping post operatively.     Spoke with patient prior to surgery and the plan was still home.    Surgery consult  POD #0 open ventral hernia repair x2 (midline and RLQ)    Full liquid diet      Will continue to follow for additional discharge needs.    If patient is discharged prior to next notation, then this note serves as note for discharge by case management.    Electronically signed by Dianne Jarquin RN on 7/12/2024 at 4:17 PM

## 2024-07-12 NOTE — OP NOTE
Operative Note      Patient: Frank Lisa  YOB: 1960  MRN: [id number]    Date of Procedure: 7/12/2024    Pre-Op Diagnosis Codes:     * Incisional hernia, without obstruction or gangrene [K43.2]    Post-Op Diagnosis: Same x 3, 15 x 17 cm, 2 x 2 cm and 3 x 3 cm diameter       Procedure(s):  OPEN HERNIA VENTRAL REPAIR  x2  (1 MIDLINE AND ONE RIGHT LOWER QUADRANT) WITH MESH 20 cm x 25 cm Phasix ST mesh, and lysis of adhesions.    Surgeon(s):  Kvng Waddell DO    Assistant:   Resident: Carolina Lockhart DO    Anesthesia: General    Estimated Blood Loss (mL): Minimal    Complications: None    Specimens:   * No specimens in log *    Implants:  Implant Name Type Inv. Item Serial No.  Lot No. LRB No. Used Action   MESH SURG [id number] [employer] RECT PHASIX - [id number]  MESH SURG [id number] [employer] RE[employer]ASIX  BARD DAVOL-WD [id number] N/A 1 Implanted         Drains:   [REMOVED] Urinary Catheter 07/12/24 Neal (Removed)   Brant-Cuellar 10 mm x 1    Findings:  Infection Present At Time Of Surgery (PATOS) (choose all levels that have infection present):  No infection present  Other Findings: There is ventral hernia measuring with 15 x 17 cm, and 2 smaller hernias at the right lower quadrant lateral to the midline incision, once measuring with 2 x 2 cm, and another one measuring 3 x 3 cm.    Detailed Description of Procedure:   Patient will support to the operating room position in supine position for the procedure of open ventral hernia repair with mesh.  Patient was intubated and sedated by anesthesia team with ET tube.  2 g of Ancef was given prior to surgery.  SCD was applied for DVT prophylaxis.  Abdomen was prepped and draped in sterilized fashion.  Timeout was carried to confirm patient's history, physical, procedure and allergies prior to incision.  Midline incisions was made with a 15 blade from the subxiphoid process to above the suprapubic area.  An elliptical was made with

## 2024-07-12 NOTE — ANESTHESIA POSTPROCEDURE EVALUATION
Department of Anesthesiology  Postprocedure Note    Patient: Frank Lisa  MRN: [id number]  YOB: 1960  Date of evaluation: 7/12/2024    Procedure Summary       Date: 07/12/24 Room / Location: [address]    Anesthesia Start: 1101 Anesthesia Stop: 1242    Procedure: OPEN HERNIA VENTRAL REPAIR  x2  (1 MIDLINE AND ONE RIGHT LOWER QUADRANT) WITH MESH (Abdomen) Diagnosis:       Incisional hernia, without obstruction or gangrene      (Incisional hernia, without obstruction or gangrene [K43.2])    Surgeons: Kvng Waddell DO Responsible Provider: Gail Bonilla MD    Anesthesia Type: general ASA Status: 3            Anesthesia Type: No value filed.    Yen Phase I: Yen Score: 9    Yen Phase II:      Anesthesia Post Evaluation    Comments: POST- ANESTHESIA EVALUATION       Pt Name: Frank Lisa  MRN: [id number]  YOB: 1960  Date of evaluation: 7/12/2024  Time:  2:31 PM      /64   Pulse 88   Temp 98.6 °F (37 °C) (Oral)   Resp 16   Ht 1.753 m (5' 9\")   Wt 88.1 kg (194 lb 3.6 oz)   SpO2 97%   BMI 28.68 kg/m²      Consciousness Level  Awake  Cardiopulmonary Status  Stable  Pain Adequately Treated YES  Nausea / Vomiting  NO  Adequate Hydration  YES  Anesthesia Related Complications NONE      Electronically signed by Gail Bonilla MD on 7/12/2024 at 2:31 PM           No notable events documented.

## 2024-07-13 LAB
AMMONIA PLAS-SCNC: 24 UMOL/L (ref 16–60)
ANION GAP SERPL CALCULATED.3IONS-SCNC: 12 MMOL/L (ref 9–17)
BUN SERPL-MCNC: 12 MG/DL (ref 8–23)
CALCIUM SERPL-MCNC: 8.6 MG/DL (ref 8.6–10.4)
CHLORIDE SERPL-SCNC: 107 MMOL/L (ref 98–107)
CO2 SERPL-SCNC: 19 MMOL/L (ref 20–31)
CREAT SERPL-MCNC: 1 MG/DL (ref 0.7–1.2)
ERYTHROCYTE [DISTWIDTH] IN BLOOD BY AUTOMATED COUNT: 15.8 % (ref 11.5–14.9)
GFR, ESTIMATED: 84 ML/MIN/1.73M2
GLUCOSE SERPL-MCNC: 125 MG/DL (ref 70–99)
HCT VFR BLD AUTO: 29.8 % (ref 41–53)
HGB BLD-MCNC: 9.6 G/DL (ref 13.5–17.5)
MCH RBC QN AUTO: 30.6 PG (ref 26–34)
MCHC RBC AUTO-ENTMCNC: 32.1 G/DL (ref 31–37)
MCV RBC AUTO: 95.4 FL (ref 80–100)
PLATELET # BLD AUTO: 220 K/UL (ref 150–450)
PMV BLD AUTO: 7.8 FL (ref 6–12)
POTASSIUM SERPL-SCNC: 4.1 MMOL/L (ref 3.7–5.3)
RBC # BLD AUTO: 3.12 M/UL (ref 4.5–5.9)
SODIUM SERPL-SCNC: 138 MMOL/L (ref 135–144)
WBC OTHER # BLD: 9.4 K/UL (ref 3.5–11)

## 2024-07-13 PROCEDURE — 6360000002 HC RX W HCPCS

## 2024-07-13 PROCEDURE — 6360000002 HC RX W HCPCS: Performed by: SURGERY

## 2024-07-13 PROCEDURE — 2580000003 HC RX 258

## 2024-07-13 PROCEDURE — 82140 ASSAY OF AMMONIA: CPT

## 2024-07-13 PROCEDURE — 1200000000 HC SEMI PRIVATE

## 2024-07-13 PROCEDURE — 51798 US URINE CAPACITY MEASURE: CPT

## 2024-07-13 PROCEDURE — 36415 COLL VENOUS BLD VENIPUNCTURE: CPT

## 2024-07-13 PROCEDURE — 6370000000 HC RX 637 (ALT 250 FOR IP)

## 2024-07-13 PROCEDURE — 80048 BASIC METABOLIC PNL TOTAL CA: CPT

## 2024-07-13 PROCEDURE — 6370000000 HC RX 637 (ALT 250 FOR IP): Performed by: SURGERY

## 2024-07-13 PROCEDURE — 85027 COMPLETE CBC AUTOMATED: CPT

## 2024-07-13 PROCEDURE — 99233 SBSQ HOSP IP/OBS HIGH 50: CPT | Performed by: INTERNAL MEDICINE

## 2024-07-13 RX ORDER — TAMSULOSIN HYDROCHLORIDE 0.4 MG/1
0.4 CAPSULE ORAL DAILY
Status: DISCONTINUED | OUTPATIENT
Start: 2024-07-13 | End: 2024-07-15 | Stop reason: HOSPADM

## 2024-07-13 RX ADMIN — LACTULOSE 30 G: 20 SOLUTION ORAL at 19:24

## 2024-07-13 RX ADMIN — SUCRALFATE 1 G: 1 TABLET ORAL at 12:28

## 2024-07-13 RX ADMIN — PANTOPRAZOLE SODIUM 40 MG: 40 TABLET, DELAYED RELEASE ORAL at 09:18

## 2024-07-13 RX ADMIN — METOPROLOL TARTRATE 12.5 MG: 25 TABLET, FILM COATED ORAL at 19:24

## 2024-07-13 RX ADMIN — OXYCODONE HYDROCHLORIDE 5 MG: 5 TABLET ORAL at 12:38

## 2024-07-13 RX ADMIN — SUCRALFATE 1 G: 1 TABLET ORAL at 05:43

## 2024-07-13 RX ADMIN — LACTULOSE 30 G: 20 SOLUTION ORAL at 14:59

## 2024-07-13 RX ADMIN — OXYCODONE HYDROCHLORIDE 5 MG: 5 TABLET ORAL at 19:24

## 2024-07-13 RX ADMIN — PANTOPRAZOLE SODIUM 40 MG: 40 TABLET, DELAYED RELEASE ORAL at 19:24

## 2024-07-13 RX ADMIN — HYDROMORPHONE HYDROCHLORIDE 0.5 MG: 1 INJECTION, SOLUTION INTRAMUSCULAR; INTRAVENOUS; SUBCUTANEOUS at 15:08

## 2024-07-13 RX ADMIN — ENOXAPARIN SODIUM 40 MG: 100 INJECTION SUBCUTANEOUS at 09:19

## 2024-07-13 RX ADMIN — METOCLOPRAMIDE 10 MG: 5 INJECTION, SOLUTION INTRAMUSCULAR; INTRAVENOUS at 05:43

## 2024-07-13 RX ADMIN — HYDROMORPHONE HYDROCHLORIDE 0.5 MG: 1 INJECTION, SOLUTION INTRAMUSCULAR; INTRAVENOUS; SUBCUTANEOUS at 05:44

## 2024-07-13 RX ADMIN — SODIUM CHLORIDE, PRESERVATIVE FREE 10 ML: 5 INJECTION INTRAVENOUS at 19:27

## 2024-07-13 RX ADMIN — SUCRALFATE 1 G: 1 TABLET ORAL at 18:35

## 2024-07-13 RX ADMIN — FOLIC ACID 1 MG: 1 TABLET ORAL at 09:18

## 2024-07-13 RX ADMIN — METOCLOPRAMIDE 10 MG: 5 INJECTION, SOLUTION INTRAMUSCULAR; INTRAVENOUS at 18:35

## 2024-07-13 RX ADMIN — SODIUM CHLORIDE, PRESERVATIVE FREE 10 ML: 5 INJECTION INTRAVENOUS at 09:20

## 2024-07-13 RX ADMIN — SUCRALFATE 1 G: 1 TABLET ORAL at 19:24

## 2024-07-13 RX ADMIN — METOCLOPRAMIDE 10 MG: 5 INJECTION, SOLUTION INTRAMUSCULAR; INTRAVENOUS at 15:00

## 2024-07-13 RX ADMIN — METOPROLOL TARTRATE 12.5 MG: 25 TABLET, FILM COATED ORAL at 09:18

## 2024-07-13 RX ADMIN — Medication 400 MG: at 09:18

## 2024-07-13 RX ADMIN — LACTULOSE 30 G: 20 SOLUTION ORAL at 09:19

## 2024-07-13 RX ADMIN — DULOXETINE HYDROCHLORIDE 30 MG: 30 CAPSULE, DELAYED RELEASE ORAL at 09:18

## 2024-07-13 RX ADMIN — TAMSULOSIN HYDROCHLORIDE 0.4 MG: 0.4 CAPSULE ORAL at 09:18

## 2024-07-13 ASSESSMENT — PAIN DESCRIPTION - DESCRIPTORS
DESCRIPTORS: ACHING
DESCRIPTORS: DISCOMFORT;SHARP
DESCRIPTORS: ACHING
DESCRIPTORS: DISCOMFORT;SHARP
DESCRIPTORS: ACHING
DESCRIPTORS: SHARP;DISCOMFORT

## 2024-07-13 ASSESSMENT — PAIN - FUNCTIONAL ASSESSMENT
PAIN_FUNCTIONAL_ASSESSMENT: PREVENTS OR INTERFERES SOME ACTIVE ACTIVITIES AND ADLS
PAIN_FUNCTIONAL_ASSESSMENT: PREVENTS OR INTERFERES WITH ALL ACTIVE AND SOME PASSIVE ACTIVITIES
PAIN_FUNCTIONAL_ASSESSMENT: PREVENTS OR INTERFERES SOME ACTIVE ACTIVITIES AND ADLS
PAIN_FUNCTIONAL_ASSESSMENT: PREVENTS OR INTERFERES SOME ACTIVE ACTIVITIES AND ADLS

## 2024-07-13 ASSESSMENT — PAIN DESCRIPTION - LOCATION
LOCATION: ABDOMEN

## 2024-07-13 ASSESSMENT — PAIN DESCRIPTION - ORIENTATION
ORIENTATION: MID;LOWER
ORIENTATION: MID
ORIENTATION: MID;LOWER
ORIENTATION: RIGHT;LEFT;MID

## 2024-07-13 ASSESSMENT — PAIN DESCRIPTION - FREQUENCY
FREQUENCY: INTERMITTENT
FREQUENCY: INTERMITTENT

## 2024-07-13 ASSESSMENT — PAIN SCALES - GENERAL
PAINLEVEL_OUTOF10: 8
PAINLEVEL_OUTOF10: 2
PAINLEVEL_OUTOF10: 1
PAINLEVEL_OUTOF10: 4
PAINLEVEL_OUTOF10: 2
PAINLEVEL_OUTOF10: 7
PAINLEVEL_OUTOF10: 5
PAINLEVEL_OUTOF10: 7
PAINLEVEL_OUTOF10: 8

## 2024-07-13 ASSESSMENT — PAIN DESCRIPTION - PAIN TYPE
TYPE: ACUTE PAIN
TYPE: SURGICAL PAIN;CHRONIC PAIN
TYPE: ACUTE PAIN

## 2024-07-13 ASSESSMENT — PAIN DESCRIPTION - ONSET: ONSET: ON-GOING

## 2024-07-13 ASSESSMENT — PAIN DESCRIPTION - DIRECTION: RADIATING_TOWARDS: ALL OVER ABDOMEN

## 2024-07-14 LAB
ANION GAP SERPL CALCULATED.3IONS-SCNC: 9 MMOL/L (ref 9–17)
BUN SERPL-MCNC: 10 MG/DL (ref 8–23)
CALCIUM SERPL-MCNC: 8.5 MG/DL (ref 8.6–10.4)
CHLORIDE SERPL-SCNC: 108 MMOL/L (ref 98–107)
CO2 SERPL-SCNC: 23 MMOL/L (ref 20–31)
CREAT SERPL-MCNC: 0.9 MG/DL (ref 0.7–1.2)
GFR, ESTIMATED: >90 ML/MIN/1.73M2
GLUCOSE SERPL-MCNC: 98 MG/DL (ref 70–99)
POTASSIUM SERPL-SCNC: 4 MMOL/L (ref 3.7–5.3)
SODIUM SERPL-SCNC: 140 MMOL/L (ref 135–144)

## 2024-07-14 PROCEDURE — 1200000000 HC SEMI PRIVATE

## 2024-07-14 PROCEDURE — 2580000003 HC RX 258: Performed by: SURGERY

## 2024-07-14 PROCEDURE — 2580000003 HC RX 258

## 2024-07-14 PROCEDURE — 6360000002 HC RX W HCPCS: Performed by: SURGERY

## 2024-07-14 PROCEDURE — 99233 SBSQ HOSP IP/OBS HIGH 50: CPT | Performed by: INTERNAL MEDICINE

## 2024-07-14 PROCEDURE — 36415 COLL VENOUS BLD VENIPUNCTURE: CPT

## 2024-07-14 PROCEDURE — 6370000000 HC RX 637 (ALT 250 FOR IP)

## 2024-07-14 PROCEDURE — 80048 BASIC METABOLIC PNL TOTAL CA: CPT

## 2024-07-14 PROCEDURE — 6370000000 HC RX 637 (ALT 250 FOR IP): Performed by: SURGERY

## 2024-07-14 PROCEDURE — 6360000002 HC RX W HCPCS

## 2024-07-14 PROCEDURE — 6360000002 HC RX W HCPCS: Performed by: INTERNAL MEDICINE

## 2024-07-14 RX ORDER — METOCLOPRAMIDE HYDROCHLORIDE 5 MG/ML
5 INJECTION INTRAMUSCULAR; INTRAVENOUS EVERY 6 HOURS
Status: DISCONTINUED | OUTPATIENT
Start: 2024-07-14 | End: 2024-07-15 | Stop reason: HOSPADM

## 2024-07-14 RX ADMIN — SUCRALFATE 1 G: 1 TABLET ORAL at 17:44

## 2024-07-14 RX ADMIN — HYDROMORPHONE HYDROCHLORIDE 0.5 MG: 1 INJECTION, SOLUTION INTRAMUSCULAR; INTRAVENOUS; SUBCUTANEOUS at 00:13

## 2024-07-14 RX ADMIN — Medication 3 MG: at 21:03

## 2024-07-14 RX ADMIN — METOPROLOL TARTRATE 12.5 MG: 25 TABLET, FILM COATED ORAL at 09:35

## 2024-07-14 RX ADMIN — DULOXETINE HYDROCHLORIDE 30 MG: 30 CAPSULE, DELAYED RELEASE ORAL at 09:36

## 2024-07-14 RX ADMIN — SODIUM CHLORIDE, PRESERVATIVE FREE 20 ML: 5 INJECTION INTRAVENOUS at 09:36

## 2024-07-14 RX ADMIN — SUCRALFATE 1 G: 1 TABLET ORAL at 06:33

## 2024-07-14 RX ADMIN — OXYCODONE HYDROCHLORIDE 5 MG: 5 TABLET ORAL at 21:03

## 2024-07-14 RX ADMIN — METOCLOPRAMIDE 10 MG: 5 INJECTION, SOLUTION INTRAMUSCULAR; INTRAVENOUS at 06:33

## 2024-07-14 RX ADMIN — SUCRALFATE 1 G: 1 TABLET ORAL at 10:19

## 2024-07-14 RX ADMIN — SUCRALFATE 1 G: 1 TABLET ORAL at 21:03

## 2024-07-14 RX ADMIN — HYDROMORPHONE HYDROCHLORIDE 0.5 MG: 1 INJECTION, SOLUTION INTRAMUSCULAR; INTRAVENOUS; SUBCUTANEOUS at 23:05

## 2024-07-14 RX ADMIN — PANTOPRAZOLE SODIUM 40 MG: 40 TABLET, DELAYED RELEASE ORAL at 21:03

## 2024-07-14 RX ADMIN — OXYCODONE HYDROCHLORIDE 5 MG: 5 TABLET ORAL at 12:40

## 2024-07-14 RX ADMIN — METOCLOPRAMIDE 10 MG: 5 INJECTION, SOLUTION INTRAMUSCULAR; INTRAVENOUS at 12:39

## 2024-07-14 RX ADMIN — PANTOPRAZOLE SODIUM 40 MG: 40 TABLET, DELAYED RELEASE ORAL at 09:35

## 2024-07-14 RX ADMIN — Medication 400 MG: at 09:35

## 2024-07-14 RX ADMIN — OXYCODONE HYDROCHLORIDE 5 MG: 5 TABLET ORAL at 06:38

## 2024-07-14 RX ADMIN — METOCLOPRAMIDE 10 MG: 5 INJECTION, SOLUTION INTRAMUSCULAR; INTRAVENOUS at 00:13

## 2024-07-14 RX ADMIN — METOCLOPRAMIDE 5 MG: 5 INJECTION, SOLUTION INTRAMUSCULAR; INTRAVENOUS at 17:53

## 2024-07-14 RX ADMIN — HYDROMORPHONE HYDROCHLORIDE 0.5 MG: 1 INJECTION, SOLUTION INTRAMUSCULAR; INTRAVENOUS; SUBCUTANEOUS at 10:19

## 2024-07-14 RX ADMIN — METOPROLOL TARTRATE 12.5 MG: 25 TABLET, FILM COATED ORAL at 21:00

## 2024-07-14 RX ADMIN — TAMSULOSIN HYDROCHLORIDE 0.4 MG: 0.4 CAPSULE ORAL at 09:36

## 2024-07-14 RX ADMIN — FOLIC ACID 1 MG: 1 TABLET ORAL at 09:35

## 2024-07-14 RX ADMIN — ENOXAPARIN SODIUM 40 MG: 100 INJECTION SUBCUTANEOUS at 09:37

## 2024-07-14 RX ADMIN — LACTULOSE 30 G: 20 SOLUTION ORAL at 09:35

## 2024-07-14 RX ADMIN — LACTULOSE 30 G: 20 SOLUTION ORAL at 21:03

## 2024-07-14 RX ADMIN — Medication 3 MG: at 00:14

## 2024-07-14 RX ADMIN — HYDROMORPHONE HYDROCHLORIDE 0.5 MG: 1 INJECTION, SOLUTION INTRAMUSCULAR; INTRAVENOUS; SUBCUTANEOUS at 17:44

## 2024-07-14 RX ADMIN — SODIUM CHLORIDE: 9 INJECTION, SOLUTION INTRAVENOUS at 06:37

## 2024-07-14 ASSESSMENT — PAIN DESCRIPTION - DESCRIPTORS
DESCRIPTORS: ACHING;SORE
DESCRIPTORS: ACHING;SORE
DESCRIPTORS: ACHING
DESCRIPTORS: SHARP;DISCOMFORT
DESCRIPTORS: DISCOMFORT;SHARP;SORE
DESCRIPTORS: ACHING;SORE
DESCRIPTORS: DISCOMFORT

## 2024-07-14 ASSESSMENT — PAIN - FUNCTIONAL ASSESSMENT
PAIN_FUNCTIONAL_ASSESSMENT: PREVENTS OR INTERFERES SOME ACTIVE ACTIVITIES AND ADLS
PAIN_FUNCTIONAL_ASSESSMENT: PREVENTS OR INTERFERES WITH MANY ACTIVE NOT PASSIVE ACTIVITIES
PAIN_FUNCTIONAL_ASSESSMENT: ACTIVITIES ARE NOT PREVENTED
PAIN_FUNCTIONAL_ASSESSMENT: PREVENTS OR INTERFERES SOME ACTIVE ACTIVITIES AND ADLS

## 2024-07-14 ASSESSMENT — PAIN SCALES - GENERAL
PAINLEVEL_OUTOF10: 8
PAINLEVEL_OUTOF10: 8
PAINLEVEL_OUTOF10: 6
PAINLEVEL_OUTOF10: 8
PAINLEVEL_OUTOF10: 6
PAINLEVEL_OUTOF10: 7
PAINLEVEL_OUTOF10: 8
PAINLEVEL_OUTOF10: 9
PAINLEVEL_OUTOF10: 8
PAINLEVEL_OUTOF10: 5
PAINLEVEL_OUTOF10: 5
PAINLEVEL_OUTOF10: 8
PAINLEVEL_OUTOF10: 8

## 2024-07-14 ASSESSMENT — PAIN DESCRIPTION - LOCATION
LOCATION: ABDOMEN

## 2024-07-14 ASSESSMENT — PAIN DESCRIPTION - ORIENTATION
ORIENTATION: MID;LOWER
ORIENTATION: LOWER;MID
ORIENTATION: MID;LOWER
ORIENTATION: LOWER;RIGHT

## 2024-07-14 NOTE — CARE COORDINATION
ONGOING DISCHARGE PLAN:    Patient is alert and oriented x4.    Spoke with patient regarding discharge plan and patient confirms that plan is still to discharge to home with [facility]    CANDY drain    Increase to reg diet     ventral hernia repair     Will continue to follow for additional discharge needs.    If patient is discharged prior to next notation, then this note serves as note for discharge by case management.    Electronically signed by Mary Ellen Resendez RN on 7/14/2024 at 11:35 AM

## 2024-07-15 VITALS
OXYGEN SATURATION: 98 % | RESPIRATION RATE: 16 BRPM | WEIGHT: 194.22 LBS | DIASTOLIC BLOOD PRESSURE: 69 MMHG | TEMPERATURE: 98.6 F | SYSTOLIC BLOOD PRESSURE: 136 MMHG | BODY MASS INDEX: 28.77 KG/M2 | HEART RATE: 73 BPM | HEIGHT: 69 IN

## 2024-07-15 LAB
ANION GAP SERPL CALCULATED.3IONS-SCNC: 9 MMOL/L (ref 9–17)
BUN SERPL-MCNC: 8 MG/DL (ref 8–23)
CALCIUM SERPL-MCNC: 8.6 MG/DL (ref 8.6–10.4)
CHLORIDE SERPL-SCNC: 109 MMOL/L (ref 98–107)
CO2 SERPL-SCNC: 22 MMOL/L (ref 20–31)
CREAT SERPL-MCNC: 0.9 MG/DL (ref 0.7–1.2)
GFR, ESTIMATED: >90 ML/MIN/1.73M2
GLUCOSE SERPL-MCNC: 95 MG/DL (ref 70–99)
POTASSIUM SERPL-SCNC: 3.6 MMOL/L (ref 3.7–5.3)
SODIUM SERPL-SCNC: 140 MMOL/L (ref 135–144)

## 2024-07-15 PROCEDURE — 6370000000 HC RX 637 (ALT 250 FOR IP)

## 2024-07-15 PROCEDURE — 2580000003 HC RX 258

## 2024-07-15 PROCEDURE — 6360000002 HC RX W HCPCS: Performed by: INTERNAL MEDICINE

## 2024-07-15 PROCEDURE — 6370000000 HC RX 637 (ALT 250 FOR IP): Performed by: SURGERY

## 2024-07-15 PROCEDURE — 36415 COLL VENOUS BLD VENIPUNCTURE: CPT

## 2024-07-15 PROCEDURE — 99239 HOSP IP/OBS DSCHRG MGMT >30: CPT | Performed by: INTERNAL MEDICINE

## 2024-07-15 PROCEDURE — 6360000002 HC RX W HCPCS: Performed by: SURGERY

## 2024-07-15 PROCEDURE — 80048 BASIC METABOLIC PNL TOTAL CA: CPT

## 2024-07-15 PROCEDURE — 6360000002 HC RX W HCPCS

## 2024-07-15 RX ORDER — OXYCODONE HYDROCHLORIDE 5 MG/1
5 TABLET ORAL EVERY 6 HOURS PRN
Qty: 12 TABLET | Refills: 0 | Status: SHIPPED | OUTPATIENT
Start: 2024-07-15 | End: 2024-07-18

## 2024-07-15 RX ORDER — ONDANSETRON 4 MG/1
4 TABLET, ORALLY DISINTEGRATING ORAL EVERY 8 HOURS PRN
Qty: 12 TABLET | Refills: 0 | Status: ON HOLD | OUTPATIENT
Start: 2024-07-15

## 2024-07-15 RX ADMIN — OXYCODONE HYDROCHLORIDE 5 MG: 5 TABLET ORAL at 03:25

## 2024-07-15 RX ADMIN — PANTOPRAZOLE SODIUM 40 MG: 40 TABLET, DELAYED RELEASE ORAL at 08:35

## 2024-07-15 RX ADMIN — DULOXETINE HYDROCHLORIDE 30 MG: 30 CAPSULE, DELAYED RELEASE ORAL at 08:35

## 2024-07-15 RX ADMIN — HYDROMORPHONE HYDROCHLORIDE 0.5 MG: 1 INJECTION, SOLUTION INTRAMUSCULAR; INTRAVENOUS; SUBCUTANEOUS at 06:03

## 2024-07-15 RX ADMIN — OXYCODONE HYDROCHLORIDE 5 MG: 5 TABLET ORAL at 11:53

## 2024-07-15 RX ADMIN — METOCLOPRAMIDE 5 MG: 5 INJECTION, SOLUTION INTRAMUSCULAR; INTRAVENOUS at 00:16

## 2024-07-15 RX ADMIN — SUCRALFATE 1 G: 1 TABLET ORAL at 06:03

## 2024-07-15 RX ADMIN — METOPROLOL TARTRATE 12.5 MG: 25 TABLET, FILM COATED ORAL at 08:36

## 2024-07-15 RX ADMIN — TAMSULOSIN HYDROCHLORIDE 0.4 MG: 0.4 CAPSULE ORAL at 08:36

## 2024-07-15 RX ADMIN — SUCRALFATE 1 G: 1 TABLET ORAL at 11:54

## 2024-07-15 RX ADMIN — METOCLOPRAMIDE 5 MG: 5 INJECTION, SOLUTION INTRAMUSCULAR; INTRAVENOUS at 06:03

## 2024-07-15 RX ADMIN — Medication 400 MG: at 08:35

## 2024-07-15 RX ADMIN — LACTULOSE 30 G: 20 SOLUTION ORAL at 08:36

## 2024-07-15 RX ADMIN — SODIUM CHLORIDE, PRESERVATIVE FREE 10 ML: 5 INJECTION INTRAVENOUS at 08:40

## 2024-07-15 RX ADMIN — FOLIC ACID 1 MG: 1 TABLET ORAL at 08:36

## 2024-07-15 ASSESSMENT — PAIN SCALES - GENERAL
PAINLEVEL_OUTOF10: 8
PAINLEVEL_OUTOF10: 7
PAINLEVEL_OUTOF10: 9
PAINLEVEL_OUTOF10: 6
PAINLEVEL_OUTOF10: 8

## 2024-07-15 ASSESSMENT — PAIN DESCRIPTION - ORIENTATION
ORIENTATION: MID;LOWER
ORIENTATION: MID;LOWER
ORIENTATION: MID;RIGHT

## 2024-07-15 ASSESSMENT — PAIN - FUNCTIONAL ASSESSMENT
PAIN_FUNCTIONAL_ASSESSMENT: PREVENTS OR INTERFERES SOME ACTIVE ACTIVITIES AND ADLS
PAIN_FUNCTIONAL_ASSESSMENT: PREVENTS OR INTERFERES SOME ACTIVE ACTIVITIES AND ADLS
PAIN_FUNCTIONAL_ASSESSMENT: ACTIVITIES ARE NOT PREVENTED

## 2024-07-15 ASSESSMENT — PAIN DESCRIPTION - DESCRIPTORS
DESCRIPTORS: SHARP
DESCRIPTORS: ACHING;SHARP
DESCRIPTORS: ACHING;SORE

## 2024-07-15 ASSESSMENT — PAIN DESCRIPTION - LOCATION
LOCATION: ABDOMEN

## 2024-07-15 NOTE — DISCHARGE SUMMARY
IN-PATIENT SERVICE   [facility] Internal Medicine    Discharge Summary     Patient ID: Frank Lisa  :  1960   MRN: [id number]     ACCOUNT:  [id number]   Patient's PCP: Lopez Rosario PA  Admit Date: 2024   Discharge Date: 7/15/2024    Length of Stay: 5  Code Status:  Full Code  Admitting Physician: Alexia Tobar MD  Discharge Physician: Jaziel Cerda MD     Active Discharge Diagnoses:     Primary Problem  Small bowel obstruction (HCC)      Hospital Problems  Active Hospital Problems    Diagnosis Date Noted    Small bowel obstruction (HCC) [K56.609] 07/10/2024       Admission Condition:  fair     Discharged Condition: fair    Hospital Stay:     Hospital Course:  Frank Lisa is a 63 y.o. male who was admitted for the management of Small bowel obstruction (HCC) , presented to ER with Abdominal Pain  63-year-old gentleman with history of alcohol abuse history of liver cirrhosis not on liver transplant status post TIPS procedure very poor historian claims he had drink last time a month ago he had history of colectomy  Admitted with abdominal pain nausea vomiting found to have small bowel obstruction with some incarceration in the right lower quadrant old ileostomy site which was reduced by the emergency room can surgery consulted  Admitted with small bowel obstruction  Patient underwent open hernia ventral repair x 2 1 midline and 1 right lower quadrant with a mesh 20 cm x 25 cm and lysis of adhesions on   Patient is doing well tolerating diet bowel movements pain control no fever  Blood pressure running over 130 systolic we will discontinue midodrine PCP to reconsider if patient continues to have hypotension due to liver disease  Patient is going home with home care    Significant therapeutic interventions:     Significant Diagnostic Studies:   Labs / Micro:        Radiology:    CT ABDOMEN PELVIS W IV CONTRAST Additional Contrast? None    Result Date:

## 2024-07-15 NOTE — PROGRESS NOTES
[facility]  Marcelo Sutton MD; Parmjit Parry MD; Jaziel Cerda MD; MD Jessika Diego MD; Alexia Tobar MD    [facility] Internal Medicine   IN-PATIENT SERVICE   [facility]    HISTORY AND PHYSICAL EXAMINATION            Date:   7/12/2024  Patient name:  Frank Lisa  Date of admission:  7/9/2024 11:25 PM  MRN:   [id number]  Account:  [id number]  YOB: 1960  PCP:    Lopez Rosario PA  Room:   ST OR Pool/NONE  Code Status:    Full Code    Chief Complaint:     Chief Complaint   Patient presents with    Abdominal Pain   SBO    History Obtained From:     Pt medical record and nursing staff    History of Present Illness:     Frank Lisa is a 63 y.o. Non- / non  male who presents with Abdominal Pain   and is admitted to the hospital for the management of Small bowel obstruction (HCC).    The patient is a 63 y.o. male  who presented to the [facility] emergency department where he is seen and evaluated by myself with complaints of right lower quadrant abdominal pain.  Patient notes that this has been intermittent for several months and was noted by CAT scan to have obstruction of small bowel with incarceration of small bowel and right lower quadrant ileostomy site incision which was reduced by ER attending.  Patient notes that the pain is markedly improved at this time and general surgery consulted regarding incisional hernia.  Patient has obvious midline incision with large incisional hernia measuring 8 x 10 cm and a right lower quadrant smaller now reduced hernia site measuring 2 x 3 cm at the old ileostomy site.  Patient has a long history of alcohol abuse and smoking and has secondary cirrhosis to same.  Patient also has COPD and history of esophageal cance     Past Medical History:     Past Medical History:   Diagnosis Date    Abnormal EKG     Acute deep vein thrombosis (DVT) of brachial vein of 
   07/10/24 1132   Encounter Summary   Encounter Overview/Reason Initial Encounter   Service Provided For Patient   Referral/Consult From Rounding   Complexity of Encounter Low   Spiritual/Emotional needs   Type Spiritual Support   Assessment/Intervention/Outcome   Assessment Unable to assess   Intervention Prayer (assurance of)/[place]       
   07/11/24 1629   Encounter Summary   Encounter Overview/Reason Attempted Encounter   Service Provided For Patient not available  (with nurse)       
   07/12/24 1632   Encounter Summary   Encounter Overview/Reason Spiritual/Emotional Needs   Service Provided For Patient   Referral/Consult From Rounding   Complexity of Encounter Low   Spiritual/Emotional needs   Type Spiritual Support   Assessment/Intervention/Outcome   Assessment Unable to assess  (patient sleeping)   Intervention Prayer (assurance of)/[place]       
1134: Report taken from JERI Jennings from surgery. Pt had open vent hernia repair x2, Midline and RLQ. Performed by Dr Waddell. Mesh placed and pt has mesh card to go home with. Pt was placed under gen anesthesia and intubated. S/p given 1 dose of dilaudid @1251 per caller. ABD padded dressing, CANDY drain RUQ. VS 98.4, 78, 16, 97%, 135/64. Surgery lasted approx 1hr 15min.   
1355: Pt returned to room from surgery. Pt alert and oriented. VS stable. Pt moaning d/t pain.   
2:26 am patient has not urinated since this  shift started. last documented urine output was at noon yesterday. currently bladder scan shows 409ml. Patient said he feels the urge to urinate, but can't. staff took him to the bathroom twice and also tried urinal at bedside: still couldn't urinate. order received to straight cath; Nurse tried to straight cath this patient. first with a flexible cath, but met resistance. then tired with a rigid cath, still met resistence. then asked one of the ICU RN, she tried with a coude, still meeting resistance we cannot get through.     3:51 am patient voided, Post void scan shows 377ml of urine still in the bladder.     Patient has a history of BPH. Contacted NP regarding difficulty to straight cath. Order received for urology consult.    Will continue to monitor  
Bladder scan showing 114 ml. Patient urinating today with no issues.  
CLINICAL PHARMACY NOTE: MEDS TO BEDS    Total # of Prescriptions Filled: 2   The following medications were delivered to the patient:  Ondansetron 4mg ODT  Oxycodone 5mg    Additional Documentation:  Medications delivered to patients room  
Dr. Waddell updated on patient. Aware of difficulty urinating and attempted straight cath's with no success last night. Continue IVF, see order for Flomax. Notified of patient's confusion last night and this morning ( Dr. Waddell states patient has a history of confusion after surgery in the past) Abdominal dressing changed last night d/t breakthrough drainage per report. See all orders.  
General Surgery Progress Note    Subjective:     Patient without complaints. No nausea or vomiting. Voiding well.  Tolerating full liquids.  Hernias currently reduced/reducible    Scheduled Meds:   DULoxetine  30 mg Oral Daily    ferrous sulfate  325 mg Oral BID    folic acid  1 mg Oral Daily    lactulose  30 g Oral TID    magnesium oxide  400 mg Oral Daily    metoprolol tartrate  12.5 mg Oral BID    midodrine  2.5 mg Oral TID WC    pantoprazole  40 mg Oral BID    sucralfate  1 g Oral 4x Daily AC & HS    sodium chloride flush  5-40 mL IntraVENous 2 times per day    enoxaparin  40 mg SubCUTAneous Daily     Continuous Infusions:   sodium chloride      sodium chloride 100 mL/hr at 07/10/24 0644     PRN Meds:sodium chloride flush, sodium chloride, potassium chloride **OR** potassium alternative oral replacement **OR** potassium chloride, magnesium sulfate, ondansetron **OR** ondansetron, melatonin, polyethylene glycol, bisacodyl, acetaminophen **OR** acetaminophen, HYDROmorphone, aluminum & magnesium hydroxide-simethicone    Objective:   /61   Pulse 62   Temp 97.9 °F (36.6 °C) (Oral)   Resp 16   Ht 1.753 m (5' 9\")   Wt 81.6 kg (180 lb)   SpO2 100%   BMI 26.58 kg/m²     I/O last 3 completed shifts:  In: 550 [P.O.:550]  Out: -     Chest: Breath sounds were clear and equal with no rales, wheezes, or rhonchi.  Respiratory effort was normal with no retractions or use of accessory muscles.    Cardiovascular: Heart sounds were normal with a regular rate and rhythm.  There were no murmurs or gallops.    Abdomen:  Bowel sounds were normal.  Large midline incisional hernia with smaller reducible ileostomy site hernia    LABS:  Lab Results   Component Value Date/Time    WBC 5.7 07/11/2024 06:37 AM    RBC 3.26 07/11/2024 06:37 AM    RBC 4.75 04/19/2012 11:30 AM    HGB 10.4 07/11/2024 06:37 AM    HCT 31.9 07/11/2024 06:37 AM    MCV 98.1 07/11/2024 06:37 AM    MCH 31.8 07/11/2024 06:37 AM    MCHC 32.4 07/11/2024 06:37 AM 
Patient was seen and evaluated by urology.  We are consulted for urinary retention and apparently had difficulty placing catheter.  He is doing better now, his PVR was recently 114 cc and he is voiding. he is not uncomfortable. No dyson needed, encourage timed voiding. Formal consult note to follow.  
Per Dr. Waddell pt will be going for surgery 7/12 at 1030.   
Physical Therapy        Physical Therapy Cancel Note      DATE: 2024    NAME: Frank Lisa  MRN: [id number]   : 1960      Patient not seen this date for Physical Therapy due to:    Patient Declined: Reports agreeable to attempting mobility assessment tomorrow. Despite encouragement and education on importance of participation in formal assessments, he continues to decline. Pt also notably demo'ing some confusion and decline in cognition, Per RN this is typical for him post-surgically. PT to follow and check back tomorrow.      Electronically signed by Kayy Paniagua PT on 2024 at 5:02 PM      
Pt was restless and fidgety. Pt didn't seem comfortable with talking with a  and declined the need for prayer.    07/13/24 1320   Encounter Summary   Encounter Overview/Reason Spiritual/Emotional Needs   Service Provided For Patient   Referral/Consult From Rounding   Last Encounter  07/13/24   Complexity of Encounter Low   Spiritual/Emotional needs   Type Spiritual Support   Assessment/Intervention/Outcome   Assessment Anxious   Intervention Active listening;Discussed illness injury and it’s impact;Explored/Affirmed feelings, thoughts, concerns;Prayer (assurance of)/[place];Sustaining Presence/Ministry of presence   Outcome Engaged in conversation;Expressed Gratitude       
RN removed IV without complications. RN reviewed all discharge paperwork with patient. All questions and concerns were answered at this time. RN encouraged patient and patient's ex-wife to make follow-up appointments. Patient and ex-wife were agreeable to make appointments. All patient belonging were taken with patient. Patient was taken via wheelchair down to car.  
07/13/2024 08:45 AM    RDW 15.8 07/13/2024 08:45 AM     07/13/2024 08:45 AM     04/19/2012 11:30 AM    MPV 7.8 07/13/2024 08:45 AM       Lab Results   Component Value Date/Time     07/14/2024 06:04 AM    K 4.0 07/14/2024 06:04 AM     07/14/2024 06:04 AM    CO2 23 07/14/2024 06:04 AM    BUN 10 07/14/2024 06:04 AM    CREATININE 0.9 07/14/2024 06:04 AM    GLUCOSE 98 07/14/2024 06:04 AM    GLUCOSE 71 04/21/2023 04:18 AM    CALCIUM 8.5 07/14/2024 06:04 AM       Assessment/Plan:   S/p incisional hernia repair x 3  Andrew diet  Less encephalopathic  Home/placement per primary service      Electronically signed by Kvng Waddell DO  on 7/15/2024 at 6:34 AM      
AM    MCHC 32.1 07/13/2024 08:45 AM    RDW 15.8 07/13/2024 08:45 AM     07/13/2024 08:45 AM     04/19/2012 11:30 AM    MPV 7.8 07/13/2024 08:45 AM       Lab Results   Component Value Date/Time     07/14/2024 06:04 AM    K 4.0 07/14/2024 06:04 AM     07/14/2024 06:04 AM    CO2 23 07/14/2024 06:04 AM    BUN 10 07/14/2024 06:04 AM    CREATININE 0.9 07/14/2024 06:04 AM    GLUCOSE 98 07/14/2024 06:04 AM    GLUCOSE 71 04/21/2023 04:18 AM    CALCIUM 8.5 07/14/2024 06:04 AM       Assessment/Plan:   S/p expl lap & incisional herniorrhaphies with mesh   Still somewhat tremulous  CANDY with less output.   Increase to reg diet.Monitor closely      Electronically signed by Kvng Waddell DO  on 7/14/2024 at 7:51 AM      
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 Mc, orthopedic, every 3-4 months, gets steroid injection    [place]ama esophagus     Bleeding gastric varices 12/29/2022    Bowel perforation (HCC) 03/25/2023    BPH (benign prostatic hypertrophy)     Cholelithiasis     Cirrhosis (HCC)     COVID-19 12/2020    pt reports he had a positive test while at [facility] in 2020, was asymptomatic    COVID-19 vaccine series completed 5/20/2021, 6/22/2021    Moderna 5/20/2021, 6/22/2021    DDD (degenerative disc disease), lumbar     Depression     Esophageal adenocarcinoma (HCC)     Esophageal cancer (HCC)     INVASIVE ADENOCARCINOMA ARISING IN TUBULAR ADENOMA WITH HIGH GRADE DYSPLASIA, ASSOCIATED WITH FOCAL INTESTINAL METAPLASIA     Esophageal varices (HCC)     Fatty liver     GERD (gastroesophageal reflux disease)     GI bleed     Heart murmur     Hep C w/o coma, chronic (HCC)     Hiatal hernia     History of alcohol abuse     6-12 beers a day; quit drinking 2019    History of blood transfusion     History of colon polyps 2016    History of tobacco abuse     [place] (hard of hearing)     Hyperlipidemia     Hypertension     Hyponatremia 07/20/2016    Hypotension 12/20/2021    Ileus (HCC) 04/12/2023    Lumbar radiculitis 11/08/2016    Malignant neoplasm of esophagus (HCC) 03/27/2024    Mastoid disorder, bilateral 12/31/2022    biLateral mastoid effusion    Pericardial effusion     PONV (postoperative nausea and vomiting)     Poor venous access     has port in place.    Port-A-Cath in place     right upper chest    Portal hypertension (HCC)     SBO (small bowel obstruction) (HCC) 04/01/2024    Sciatica     Secondary esophageal varices (HCC) 06/07/2022    Shortness of breath     Sleep difficulties     Small bowel 
 01/04/2016    steroid injection C7 T1    OTHER SURGICAL HISTORY  11/21/2016    Bilateral Lumbar CACHORRO L4-L5 injections    OTHER SURGICAL HISTORY  12/19/2016    lumbar steroid injection    OTHER SURGICAL HISTORY  09/28/2018    BILATERAL L5 CACHORRO (N/A Back)    OTHER SURGICAL HISTORY Right 11/23/2020    cervical injection    PAIN MANAGEMENT PROCEDURE Left 07/09/2020    EPIDURAL STEROID INJECTION LEFT L4 L5 performed by Dheeraj Lau MD at [facility] OR    PAIN MANAGEMENT PROCEDURE Left 07/20/2020    LEFT L4 L5 EPIDURAL STEROID INJECTION performed by Dheeraj Lau MD at [facility] OR    PAIN MANAGEMENT PROCEDURE Bilateral 08/17/2020    LUMBAR FACET BILATERAL L2-L5 performed by Dheeraj Lau MD at [facility] OR    PAIN MANAGEMENT PROCEDURE Bilateral 12/07/2020    NERVE BLOCK BILATERAL LUMBAR MEDIAL BRANCH L2-L5 performed by Dheeraj Lau MD at [facility] OR    PARACENTESIS      Multiple    MT NEUROPLASTY &/TRANSPOS MEDIAN NRV CARPAL TUNNE Right 08/29/2017    CARPAL TUNNEL RELEASE RIGHT performed by Curtis Villalobos MD at [facility] OR    MT NEUROPLASTY &/TRANSPOS MEDIAN NRV CARPAL TUNNE Left 10/31/2017    CARPAL TUNNEL RELEASE performed by Curtis Villalobos MD at [facility] OR    MT NJX AA&/STRD TFRML EPI LUMBAR/SACRAL 1 LEVEL Bilateral 09/06/2018    BILATERAL L5 CACHORRO performed by Dheeraj Lau MD at [facility] OR    MT NJX AA&/STRD TFRML EPI LUMBAR/SACRAL 1 LEVEL N/A 09/28/2018    BILATERAL L5 CACHORRO performed by Dheeraj Lau MD at [facility] OR    SMALL INTESTINE SURGERY N/A 10/10/2023    EXPLORATORY LAPAROTOMY, REVERSAL ILEOSTOMY WITH ILEOCOLOSTOMY, LYSIS OF ADHESIONS, performed by Kvng Waddell DO at [facility] OR    THORACENTESIS      UPPER GASTROINTESTINAL ENDOSCOPY N/A 12/29/2020    EGD BIOPSY performed by Lucian Harley MD at [facility] ENDO    UPPER GASTROINTESTINAL ENDOSCOPY N/A 02/02/2021    EGD BIOPSY and spot marking performed by Augusto Cordova MD at [facility] ENDO    UPPER GASTROINTESTINAL ENDOSCOPY N/A 02/12/2021    ENDOSCOPIC ULTRASOUND, EGD performed by 
Collection Time: 07/09/24 11:54 PM   Result Value Ref Range    Sodium 135 135 - 144 mmol/L    Potassium 4.6 3.7 - 5.3 mmol/L    Chloride 103 98 - 107 mmol/L    CO2 25 20 - 31 mmol/L    Anion Gap 7 (L) 9 - 17 mmol/L    Glucose 119 (H) 70 - 99 mg/dL    BUN 8 8 - 23 mg/dL    Creatinine 0.9 0.7 - 1.2 mg/dL    Est, Glom Filt Rate >90 >60 mL/min/1.73m2    Calcium 8.3 (L) 8.6 - 10.4 mg/dL   Magnesium    Collection Time: 07/09/24 11:54 PM   Result Value Ref Range    Magnesium 1.6 1.6 - 2.6 mg/dL   Hepatic Function Panel    Collection Time: 07/09/24 11:54 PM   Result Value Ref Range    Albumin 2.5 (L) 3.5 - 5.2 g/dL    Alkaline Phosphatase 155 (H) 40 - 129 U/L    ALT 19 5 - 41 U/L    AST 55 (H) <40 U/L    Total Bilirubin 1.2 0.3 - 1.2 mg/dL    Bilirubin, Direct 0.4 (H) <0.3 mg/dL    Bilirubin, Indirect 0.8 0.0 - 1.0 mg/dL    Total Protein 5.7 (L) 6.4 - 8.3 g/dL   Lipase    Collection Time: 07/09/24 11:54 PM   Result Value Ref Range    Lipase 72 (H) 13 - 60 U/L   Lactic Acid    Collection Time: 07/09/24 11:54 PM   Result Value Ref Range    Lactic Acid 2.0 0.5 - 2.2 mmol/L   Protime-INR    Collection Time: 07/09/24 11:54 PM   Result Value Ref Range    Protime 16.9 (H) 11.8 - 14.6 sec    INR 1.3    Urinalysis with Reflex to Culture    Collection Time: 07/10/24  1:18 AM    Specimen: Urine   Result Value Ref Range    Color, UA Yellow Yellow    Turbidity UA Clear Clear    Glucose, Ur NEGATIVE NEGATIVE mg/dL    Bilirubin, Urine NEGATIVE NEGATIVE    Ketones, Urine NEGATIVE NEGATIVE mg/dL    Specific Gravity, UA 1.005 1.000 - 1.030    Urine Hgb MOD (A) NEGATIVE    pH, Urine 7.0 5.0 - 8.0    Protein, UA NEGATIVE NEGATIVE mg/dL    Urobilinogen, Urine Normal 0.0 - 1.0 EU/dL    Nitrite, Urine NEGATIVE NEGATIVE    Leukocyte Esterase, Urine NEGATIVE NEGATIVE   Microscopic Urinalysis    Collection Time: 07/10/24  1:18 AM   Result Value Ref Range    WBC, UA 0 TO 2 (A) 0 TO 5 /HPF    RBC, UA 10 TO 20 (A) 0 TO 2 /HPF    Casts UA 0 TO 2 (A) 
extremities  BEHAVIOR/PSYCH:  negative for depression, anxiety    Physical Exam:   BP (!) 143/77   Pulse 81   Temp 98.7 °F (37.1 °C) (Oral)   Resp 16   Ht 1.753 m (5' 9\")   Wt 88.1 kg (194 lb 3.6 oz)   SpO2 95%   BMI 28.68 kg/m²   Temp (24hrs), Av.2 °F (36.8 °C), Min:97.8 °F (36.6 °C), Max:98.7 °F (37.1 °C)    No results for input(s): \"POCGLU\" in the last 72 hours.    Intake/Output Summary (Last 24 hours) at 2024 1424  Last data filed at 2024 1054  Gross per 24 hour   Intake 20 ml   Output 792 ml   Net -772 ml       General Appearance: alert, well appearing, and in no acute distress  Mental status: oriented to person, place, and time  Head: normocephalic, atraumatic  Eye: no icterus, redness, pupils equal and reactive, extraocular eye movements intact, conjunctiva clear  Ear: normal external ear, no discharge, hearing intact  Nose: no drainage noted  Mouth: mucous membranes moist  Neck: supple, no carotid bruits, thyroid not palpable  Lungs: Bilateral equal air entry, clear to ausculation, no wheezing, rales or rhonchi, normal effort  Cardiovascular: normal rate, regular rhythm, no murmur, gallop, rub  Abdomen: Soft mildly tender and mildly distended no guarding no rigidity   Multiple laparotomy scars noted neurologic: There are no new focal motor or sensory deficits, normal muscle tone and bulk, no abnormal sensation, normal speech, cranial nerves II through XII grossly intact  Skin: No gross lesions, rashes, bruising or bleeding on exposed skin area  Extremities: peripheral pulses palpable, no pedal edema or calf pain with palpation  Psych: normal affect    Investigations:      Laboratory Testing:  Recent Results (from the past 24 hour(s))   Basic Metabolic Panel    Collection Time: 24  6:04 AM   Result Value Ref Range    Sodium 140 135 - 144 mmol/L    Potassium 4.0 3.7 - 5.3 mmol/L    Chloride 108 (H) 98 - 107 mmol/L    CO2 23 20 - 31 mmol/L    Anion Gap 9 9 - 17 mmol/L    Glucose 98 70

## 2024-07-15 NOTE — CONSULTS
canal and external ear normal.      Nose: Nose normal.      Mouth/Throat:      Mouth: Mucous membranes are moist.      Pharynx: Oropharynx is clear. No oropharyngeal exudate.   Eyes:      General: No scleral icterus.     Extraocular Movements: Extraocular movements intact.      Pupils: Pupils are equal, round, and reactive to light.   Cardiovascular:      Rate and Rhythm: Normal rate and regular rhythm.      Pulses: Normal pulses.      Heart sounds: Normal heart sounds.   Pulmonary:      Effort: Pulmonary effort is normal.      Breath sounds: Normal breath sounds.   Abdominal:      General: Abdomen is flat.      Palpations: Abdomen is soft.      Hernia: A hernia (Has 8 x 10 cm midline incisional hernia and 2 x 3 cm right lower quadrant incisional hernia currently reduced) is present.   Genitourinary:     Penis: Normal.       Testes: Normal.      Rectum: Normal.   Musculoskeletal:         General: No deformity. Normal range of motion.      Cervical back: Normal range of motion and neck supple.   Lymphadenopathy:      Cervical: No cervical adenopathy.   Skin:     General: Skin is warm and dry.      Capillary Refill: Capillary refill takes less than 2 seconds.      Coloration: Skin is not jaundiced.   Neurological:      General: No focal deficit present.      Mental Status: He is alert and oriented to person, place, and time.      Cranial Nerves: No cranial nerve deficit.   Psychiatric:         Mood and Affect: Mood normal.         Behavior: Behavior normal.           CBC:   Lab Results   Component Value Date/Time    WBC 6.8 07/10/2024 06:25 AM    RBC 3.15 07/10/2024 06:25 AM    HGB 9.7 07/10/2024 06:25 AM    HCT 29.7 07/10/2024 06:25 AM    MCV 94.2 07/10/2024 06:25 AM    MCH 30.9 07/10/2024 06:25 AM    MCHC 32.8 07/10/2024 06:25 AM    RDW 16.3 07/10/2024 06:25 AM     07/10/2024 06:25 AM    MPV 7.2 07/10/2024 06:25 AM     CMP:    Lab Results   Component Value Date/Time     07/09/2024 11:54 PM    K 4.6 
right rectus abdominis muscle in the location of a prior right lower quadrant stoma. 2. Liquid stool in the proximal portion of the remnant colon can be seen with diarrhea but could also be due in part to a degree of ileus.  No obvious findings of enterocolitis. 3. Findings of volume overload include trace to small right pleural effusion, trace left pleural effusion, trace pericardial effusion, trace ascites, and minimal to mild anasarca. 4. Cirrhotic liver with no evident focal lesion. 5. Dilation of the main portal vein with no other findings of portal hypertension.  An associated TIPS remains patent. 6. Appearance of the urinary bladder that can be seen with mild cystitis. 7. Circumferential wall thickening in the distal thoracic esophagus.  While suggestive of esophagitis, malignancy is not excluded given patient history.       Impression:    Patient Active Problem List   Diagnosis    Back pain, chronic    Hearing difficulty    GERD (gastroesophageal reflux disease)    Cervical radicular pain    Hypomagnesemia    Chronic viral hepatitis B without delta agent and without coma (HCC)    Calculus of gallbladder without cholecystitis    Hep C w/o coma, chronic (HCC)    Fatty liver    Psychophysiologic insomnia    Cirrhosis (HCC)    Decompensation of cirrhosis of liver (HCC)    Vertebrogenic low back pain    DDD (degenerative disc disease), lumbar    Depression    Tubular adenoma of colon    History of colon polyps    Gynecomastia, male    Lumbar disc herniation    Tinnitus    Eustachian tube dysfunction    Ganglion cyst    Carpal tunnel syndrome of right wrist    History of hepatitis C    Vitamin D deficiency    Pure hypercholesterolemia    Hypokalemia    Essential hypertension    Recurrent major depressive disorder in partial remission (HCC)    S/P epidural steroid injection    Elevated LFTs    Seasonal allergies    Lumbar facet arthropathy    Cervical facet syndrome    Thrombocytopenia (HCC)    Hepatitis C virus

## 2024-07-15 NOTE — DISCHARGE INSTR - COC
Continuity of Care Form    Patient Name: Frank Lisa   :  1960  MRN:  [id number]    Admit date:  2024  Discharge date:  7/15/24    Code Status Order: Full Code   Advance Directives:   Advance Care Flowsheet Documentation       Date/Time Healthcare Directive Type of Healthcare Directive Copy in Chart Healthcare Agent Appointed Healthcare Agent's Name Healthcare Agent's Phone Number    [phone] Yes, patient has an advance directive for healthcare treatment Living will Yes, copy in chart -- -- --            Admitting Physician:  Alexia Tobar MD  PCP: Lopez Rosario PA    Discharging Nurse:   Discharging Hospital Unit/Room#: 4/4-01  Discharging Unit Phone Number: [phone]    Emergency Contact:   Extended Emergency Contact Information  Primary Emergency Contact: Maria LNilam  Address: [address] [place] [address]  Home Phone: [phone]  Work Phone: [phone]  Mobile Phone: [phone]  Relation: Ex-Spouse    Past Surgical History:  Past Surgical History:   Procedure Laterality Date    BUNIONECTOMY      twice on right side    BUNIONECTOMY Left     CARPAL TUNNEL RELEASE Right     COLON SURGERY  10/10/2023    EXPLORATORY LAPAROTOMY, REVERSAL ILEOSTOMY WITH ILEOCOLOSTOMY, LYSIS OF ADHESIONS,    COLONOSCOPY      at age 39    COLONOSCOPY  10/05/2016    polyps-pathology tubular adenoma, and abnormal looking mucosa right colon-pathology-tubular adenoma    COLONOSCOPY N/A 2018    COLONOSCOPY POLYPECTOMY COLD BIOPSY performed by Augusto Cordova MD at [facility] OR    COLONOSCOPY  2018    Small polyp in the sigmoid colon and excised with biopsy forceps--tubular adenoma    COLONOSCOPY N/A 2022    COLONOSCOPY POLYPECTOMY performed by Augusto Cordova MD at [facility] OR    CYSTOSCOPY N/A 2024    CYSTOSCOPY performed by Edinson Griffith MD at [facility] OR    ENDOSCOPY, COLON, DIAGNOSTIC      EGD    ESOPHAGOGASTRODUODENOSCOPY  2022

## 2024-07-15 NOTE — CARE COORDINATION
IMM letter provided to patient.  Patient offered four hours to make informed decision regarding appeal process; patient agreeable to discharge.     Electronically signed by Leonila Chapa RN on 7/15/2024 at 1:27 PM

## 2024-07-15 NOTE — CARE COORDINATION
ONGOING DISCHARGE PLAN:    Patient is alert and oriented x4.    Spoke with patient regarding discharge plan and patient confirms that plan is still home. Pt has now decided he would like [facility]. States he has used them in the past. Referral faxed and notified Devante from [facility] who states pt has been accepted for [facility]. Notified Devante that pt will be d/c'ed home. He will pull [facility] and d/c med list from Epic.                       Post Acute Facility/Agency List     Provided patient with the following list, the list includes the overall star ratings obtained from CMS per the Medicare Web site (www.Medicare.gov):     [] Long Term Acute Care Facilities  [] Acute Inpatient Rehabilitation Facilities  [] Skilled Nursing Facilities  [x] Home Care    Provided verbal instructions on how to utilize the QR Code to obtain additional detailed star ratings from www.Medicare.gov     offered to print and provide the detailed list:    []Accepted   [x]Declined    POD#3 open ventral hernia repair. Low fat diet.    Will continue to follow for additional discharge needs.    If patient is discharged prior to next notation, then this note serves as note for discharge by case management.    Electronically signed by Leonila Chapa RN on 7/15/2024 at 1:25 PM

## 2024-07-15 NOTE — PLAN OF CARE
Problem: Discharge Planning  Goal: Discharge to home or other facility with appropriate resources  7/11/2024 0210 by Vanessa Colon, RN  Outcome: Progressing     Problem: Pain  Goal: Verbalizes/displays adequate comfort level or baseline comfort level  7/11/2024 0210 by Vanessa Colon, RN  Outcome: Progressing     
  Problem: Discharge Planning  Goal: Discharge to home or other facility with appropriate resources  7/11/2024 1110 by Talia Reid RN  Outcome: Progressing  Flowsheets (Taken 7/11/2024 1110)  Discharge to home or other facility with appropriate resources:   Identify barriers to discharge with patient and caregiver   Arrange for needed discharge resources and transportation as appropriate   Identify discharge learning needs (meds, wound care, etc)  7/11/2024 0210 by Vanessa Colon RN  Outcome: Progressing     Problem: Pain  Goal: Verbalizes/displays adequate comfort level or baseline comfort level  7/11/2024 1110 by Talia Reid RN  Outcome: Progressing  Flowsheets (Taken 7/11/2024 1110)  Verbalizes/displays adequate comfort level or baseline comfort level:   Encourage patient to monitor pain and request assistance   Assess pain using appropriate pain scale   Administer analgesics based on type and severity of pain and evaluate response   Implement non-pharmacological measures as appropriate and evaluate response  7/11/2024 0210 by Vanessa Colon RN  Outcome: Progressing     Problem: Safety - Adult  Goal: Free from fall injury  Outcome: Progressing  Flowsheets (Taken 7/11/2024 1110)  Free From Fall Injury: Instruct family/caregiver on patient safety     Problem: ABCDS Injury Assessment  Goal: Absence of physical injury  Outcome: Progressing  Flowsheets (Taken 7/11/2024 1110)  Absence of Physical Injury: Implement safety measures based on patient assessment     
  Problem: Discharge Planning  Goal: Discharge to home or other facility with appropriate resources  7/12/2024 0823 by Talia Reid RN  Outcome: Progressing  Flowsheets (Taken 7/11/2024 1110)  Discharge to home or other facility with appropriate resources:   Identify barriers to discharge with patient and caregiver   Arrange for needed discharge resources and transportation as appropriate   Identify discharge learning needs (meds, wound care, etc)  7/12/2024 0237 by Toya Conte RN  Outcome: Progressing     Problem: Pain  Goal: Verbalizes/displays adequate comfort level or baseline comfort level  7/12/2024 0823 by Talia Reid RN  Outcome: Progressing  Flowsheets (Taken 7/11/2024 1110)  Verbalizes/displays adequate comfort level or baseline comfort level:   Encourage patient to monitor pain and request assistance   Assess pain using appropriate pain scale   Administer analgesics based on type and severity of pain and evaluate response   Implement non-pharmacological measures as appropriate and evaluate response  7/12/2024 0237 by Toya Conte RN  Outcome: Progressing     Problem: Safety - Adult  Goal: Free from fall injury  7/12/2024 0823 by Talia Reid RN  Outcome: Progressing  Flowsheets (Taken 7/11/2024 1110)  Free From Fall Injury: Instruct family/caregiver on patient safety  7/12/2024 0237 by Toya Conte RN  Outcome: Progressing     Problem: ABCDS Injury Assessment  Goal: Absence of physical injury  7/12/2024 0823 by Talia Reid RN  Outcome: Progressing  Flowsheets (Taken 7/11/2024 1110)  Absence of Physical Injury: Implement safety measures based on patient assessment  7/12/2024 0237 by Toya Conte RN  Outcome: Progressing     Problem: Gastrointestinal - Adult  Goal: Minimal or absence of nausea and vomiting  7/12/2024 0823 by Talia Reid RN  Outcome: Progressing  Flowsheets (Taken 7/12/2024 0823)  Minimal or absence of nausea and vomiting: 
  Problem: Discharge Planning  Goal: Discharge to home or other facility with appropriate resources  Outcome: Progressing     Problem: Pain  Goal: Verbalizes/displays adequate comfort level or baseline comfort level  Outcome: Progressing     
  Problem: Discharge Planning  Goal: Discharge to home or other facility with appropriate resources  Outcome: Progressing     Problem: Pain  Goal: Verbalizes/displays adequate comfort level or baseline comfort level  Outcome: Progressing  Flowsheets (Taken 7/14/2024 5847)  Verbalizes/displays adequate comfort level or baseline comfort level:   Encourage patient to monitor pain and request assistance   Assess pain using appropriate pain scale   Administer analgesics based on type and severity of pain and evaluate response   Implement non-pharmacological measures as appropriate and evaluate response   Notify Licensed Independent Practitioner if interventions unsuccessful or patient reports new pain     Problem: Safety - Adult  Goal: Free from fall injury  Outcome: Progressing     Problem: ABCDS Injury Assessment  Goal: Absence of physical injury  Outcome: Progressing     Problem: Gastrointestinal - Adult  Goal: Minimal or absence of nausea and vomiting  Outcome: Progressing  Goal: Maintains or returns to baseline bowel function  Outcome: Progressing  Goal: Maintains adequate nutritional intake  Outcome: Progressing     Problem: Chronic Conditions and Co-morbidities  Goal: Patient's chronic conditions and co-morbidity symptoms are monitored and maintained or improved  Outcome: Progressing     
  Problem: Pain  Goal: Verbalizes/displays adequate comfort level or baseline comfort level  Outcome: Progressing   Patient medicated as ordered. Repositioned for comfort.  Problem: Safety - Adult  Goal: Free from fall injury  Outcome: Progressing   Pt fall risk, fall band present, falling star, safety alarm activated and in use as needed. Hourly rounding performed. Pt encouraged to use call light. See Wilfredo fall risk assessment.   Problem: Gastrointestinal - Adult  Goal: Minimal or absence of nausea and vomiting  Outcome: Progressing   No N/V. IVF infusing. Diet advanced.  
  Problem: Safety - Adult  Goal: Free from fall injury  7/13/2024 1016 by Camelia Patrick, RN  Outcome: Progressing  Pt fall risk, fall band present, falling star, safety alarm activated and in use as needed. Hourly rounding performed. Pt encouraged to use call light. See Villalobos fall risk assessment.   Problem: Pain  Goal: Verbalizes/displays adequate comfort level or baseline comfort level  7/13/2024 1016 by Camelia Patrick, RN  Outcome: Progressing  Patient denies pain at this time. Will medicate as ordered if needed.  
Problem: Pain  Goal: Verbalizes/displays adequate comfort level or baseline comfort level  Outcome: Progressing   No new signs/symptoms of pain noted, pain rating < 3 on scale 0-10, pain controlled with medication/repositioning  Problem: Safety - Adult  Goal: Free from fall injury  Outcome: Progressing   No falls/injuries this shift, bed in lowest position, brakes on, bed alarm on, call light in reach, side rails up x2  Problem: Gastrointestinal - Adult  Goal: Minimal or absence of nausea and vomiting  Outcome: Progressing   Patient due to have surgery on 07/12/2024 for small bowel obstruction  
Adult  Goal: Maintains adequate nutritional intake  7/12/2024 2205 by Rahel Higgins, RN  Outcome: Progressing

## 2024-07-16 ENCOUNTER — CARE COORDINATION (OUTPATIENT)
Dept: CARE COORDINATION | Age: 64
End: 2024-07-16

## 2024-07-16 ENCOUNTER — TELEPHONE (OUTPATIENT)
Dept: UROLOGY | Age: 64
End: 2024-07-16

## 2024-07-16 NOTE — TELEPHONE ENCOUNTER
Patient was admitted, needs follow up.     Provider: Mary or Dr. Griffith  To follow up in office in: 2-4 weeks  Reason for visit: hospital f/u with [facility]

## 2024-07-16 NOTE — CARE COORDINATION
Care Transitions Note    Initial Call - Call within 2 business days of discharge: Yes    Attempted to reach patient for transitions of care follow up. Unable to reach patient.    Outreach Attempts:   HIPAA compliant voicemail left for patient.     Patient: Frank Lisa    Patient : 1960   MRN: [id number]    Reason for Admission: SBO  Discharge Date: 7/15/24  RURS: Readmission Risk Score: 39.9    Last Discharge Facility       Date Complaint Diagnosis Description Type Department Provider    24 Abdominal Pain Small bowel obstruction (HCC) ED to Hosp-Admission (Discharged) (ADMITTED) [facility] RADHA Jaziel Cerda MD; BRIGETTE Casillas..            Was this an external facility discharge? No    Follow Up Appointment:   Patient does not have a follow up appointment scheduled at time of call.   Future Appointments         Provider Specialty Dept Phone    2025 1:40 PM Edinson Griffith MD Urology [phone]            Plan for follow-up on next business day.      Yoselyn Bautista RN

## 2024-07-17 ENCOUNTER — CARE COORDINATION (OUTPATIENT)
Dept: CARE COORDINATION | Age: 64
End: 2024-07-17

## 2024-07-17 NOTE — CARE COORDINATION
Care Transitions Note    Initial Call - Call within 2 business days of discharge: Yes    Attempted to reach patient for transitions of care follow up.  Unable to reach patient.      Outreach Attempts:   Multiple attempts to contact patient, spouse/partner  at phone numbers on file.     Care Summary Note: Second attempt, left VM for patient and next-of-kin. Received call back from [place], is not with patient, states will see him later and call back to CTN, if patient willing. Closing for ANTWAN.     Follow Up Appointment:   Future Appointments         Provider Specialty Dept Phone    1/7/2025 1:40 PM Edinson Griffith MD Urology [phone]            No further follow-up call indicated based on severity of symptoms and risk factors. Plan for next call:     Yoselyn Bautista RN

## 2024-07-19 ENCOUNTER — HOSPITAL ENCOUNTER (OUTPATIENT)
Age: 64
Discharge: HOME | End: 2024-07-19
Payer: COMMERCIAL

## 2024-07-19 DIAGNOSIS — E87.6 HYPOKALEMIA: Primary | ICD-10-CM

## 2024-07-19 DIAGNOSIS — K70.31 ASCITES DUE TO ALCOHOLIC CIRRHOSIS (HCC): ICD-10-CM

## 2024-07-19 DIAGNOSIS — I10 ESSENTIAL HYPERTENSION: ICD-10-CM

## 2024-07-19 LAB
ALBUMIN SERPL-MCNC: 2.8 G/DL (ref 3.5–5.2)
ALP SERPL-CCNC: 141 U/L (ref 40–129)
ALT SERPL-CCNC: 10 U/L (ref 5–41)
ANION GAP SERPL CALCULATED.3IONS-SCNC: 11 MMOL/L (ref 9–17)
ANION GAP SERPL CALCULATED.3IONS-SCNC: 11 MMOL/L (ref 9–17)
AST SERPL-CCNC: 42 U/L
BILIRUB SERPL-MCNC: 0.9 MG/DL (ref 0.3–1.2)
BUN SERPL-MCNC: 4 MG/DL (ref 8–23)
BUN SERPL-MCNC: 4 MG/DL (ref 8–23)
CALCIUM SERPL-MCNC: 8 MG/DL (ref 8.6–10.4)
CALCIUM SERPL-MCNC: 8.1 MG/DL (ref 8.6–10.4)
CHLORIDE SERPL-SCNC: 108 MMOL/L (ref 98–107)
CHLORIDE SERPL-SCNC: 108 MMOL/L (ref 98–107)
CO2 SERPL-SCNC: 23 MMOL/L (ref 20–31)
CO2 SERPL-SCNC: 23 MMOL/L (ref 20–31)
CREAT SERPL-MCNC: 1 MG/DL (ref 0.7–1.2)
CREAT SERPL-MCNC: 1 MG/DL (ref 0.7–1.2)
GFR, ESTIMATED: 84 ML/MIN/1.73M2
GFR, ESTIMATED: 84 ML/MIN/1.73M2
GLUCOSE SERPL-MCNC: 87 MG/DL (ref 70–99)
GLUCOSE SERPL-MCNC: 88 MG/DL (ref 70–99)
LIPASE SERPL-CCNC: 85 U/L (ref 13–60)
MAGNESIUM SERPL-MCNC: 1.2 MG/DL (ref 1.6–2.6)
POTASSIUM SERPL-SCNC: 3.2 MMOL/L (ref 3.7–5.3)
POTASSIUM SERPL-SCNC: 3.3 MMOL/L (ref 3.7–5.3)
PROT SERPL-MCNC: 5.8 G/DL (ref 6.4–8.3)
SODIUM SERPL-SCNC: 142 MMOL/L (ref 135–144)
SODIUM SERPL-SCNC: 142 MMOL/L (ref 135–144)

## 2024-07-19 PROCEDURE — 83690 ASSAY OF LIPASE: CPT

## 2024-07-19 PROCEDURE — 80048 BASIC METABOLIC PNL TOTAL CA: CPT

## 2024-07-19 PROCEDURE — 36415 COLL VENOUS BLD VENIPUNCTURE: CPT

## 2024-07-19 PROCEDURE — 80053 COMPREHEN METABOLIC PANEL: CPT

## 2024-07-19 PROCEDURE — 83735 ASSAY OF MAGNESIUM: CPT

## 2024-07-19 RX ORDER — POTASSIUM CHLORIDE 20 MEQ/1
20 TABLET, EXTENDED RELEASE ORAL DAILY
Qty: 7 TABLET | Refills: 0 | Status: ON HOLD | OUTPATIENT
Start: 2024-07-19

## 2024-07-20 ENCOUNTER — APPOINTMENT (OUTPATIENT)
Dept: CT IMAGING | Age: 64
DRG: 479 | End: 2024-07-20
Payer: COMMERCIAL

## 2024-07-20 ENCOUNTER — HOSPITAL ENCOUNTER (INPATIENT)
Age: 64
LOS: 9 days | Discharge: NURSING, REHAB OR LONG-TERM CARE | DRG: 479 | End: 2024-07-29
Attending: STUDENT IN AN ORGANIZED HEALTH CARE EDUCATION/TRAINING PROGRAM | Admitting: ORTHOPAEDIC SURGERY
Payer: COMMERCIAL

## 2024-07-20 DIAGNOSIS — S22.030A COMPRESSION FRACTURE OF T3 VERTEBRA, INITIAL ENCOUNTER (HCC): ICD-10-CM

## 2024-07-20 DIAGNOSIS — R06.02 SHORTNESS OF BREATH: ICD-10-CM

## 2024-07-20 DIAGNOSIS — E87.6 HYPOKALEMIA: ICD-10-CM

## 2024-07-20 DIAGNOSIS — E83.42 HYPOMAGNESEMIA: ICD-10-CM

## 2024-07-20 DIAGNOSIS — S22.038A OTHER CLOSED FRACTURE OF THIRD THORACIC VERTEBRA, INITIAL ENCOUNTER (HCC): Primary | ICD-10-CM

## 2024-07-20 PROBLEM — S22.009A: Status: ACTIVE | Noted: 2024-07-20

## 2024-07-20 LAB
ALBUMIN SERPL-MCNC: 2.8 G/DL (ref 3.5–5.2)
ALP SERPL-CCNC: 146 U/L (ref 40–129)
ALT SERPL-CCNC: 10 U/L (ref 5–41)
AMMONIA PLAS-SCNC: 30 UMOL/L (ref 16–60)
ANION GAP SERPL CALCULATED.3IONS-SCNC: 11 MMOL/L (ref 9–17)
APAP SERPL-MCNC: <5 UG/ML (ref 10–30)
AST SERPL-CCNC: 42 U/L
BASOPHILS # BLD: 0.1 K/UL (ref 0–0.2)
BASOPHILS NFR BLD: 1 % (ref 0–2)
BILIRUB SERPL-MCNC: 1.1 MG/DL (ref 0.3–1.2)
BUN SERPL-MCNC: 6 MG/DL (ref 8–23)
CALCIUM SERPL-MCNC: 8.3 MG/DL (ref 8.6–10.4)
CHLORIDE SERPL-SCNC: 108 MMOL/L (ref 98–107)
CO2 SERPL-SCNC: 21 MMOL/L (ref 20–31)
CREAT SERPL-MCNC: 0.9 MG/DL (ref 0.7–1.2)
EOSINOPHIL # BLD: 0.1 K/UL (ref 0–0.4)
EOSINOPHILS RELATIVE PERCENT: 1 % (ref 0–4)
ERYTHROCYTE [DISTWIDTH] IN BLOOD BY AUTOMATED COUNT: 14.8 % (ref 11.5–14.9)
ETHANOL PERCENT: 0.24 %
ETHANOLAMINE SERPL-MCNC: 239 MG/DL (ref 0–0.08)
GFR, ESTIMATED: >90 ML/MIN/1.73M2
GLUCOSE SERPL-MCNC: 118 MG/DL (ref 70–99)
HCT VFR BLD AUTO: 30.6 % (ref 41–53)
HGB BLD-MCNC: 10.3 G/DL (ref 13.5–17.5)
INR PPP: 1.5
LIPASE SERPL-CCNC: 29 U/L (ref 13–60)
LYMPHOCYTES NFR BLD: 1.1 K/UL (ref 1–4.8)
LYMPHOCYTES RELATIVE PERCENT: 13 % (ref 24–44)
MAGNESIUM SERPL-MCNC: 1.2 MG/DL (ref 1.6–2.6)
MCH RBC QN AUTO: 31.3 PG (ref 26–34)
MCHC RBC AUTO-ENTMCNC: 33.6 G/DL (ref 31–37)
MCV RBC AUTO: 93 FL (ref 80–100)
MONOCYTES NFR BLD: 1.3 K/UL (ref 0.1–1.3)
MONOCYTES NFR BLD: 15 % (ref 1–7)
NEUTROPHILS NFR BLD: 70 % (ref 36–66)
NEUTS SEG NFR BLD: 6.3 K/UL (ref 1.3–9.1)
PLATELET # BLD AUTO: 164 K/UL (ref 150–450)
PMV BLD AUTO: 7.4 FL (ref 6–12)
POTASSIUM SERPL-SCNC: 3.3 MMOL/L (ref 3.7–5.3)
PROT SERPL-MCNC: 6.1 G/DL (ref 6.4–8.3)
PROTHROMBIN TIME: 18.4 SEC (ref 11.8–14.6)
RBC # BLD AUTO: 3.29 M/UL (ref 4.5–5.9)
SALICYLATES SERPL-MCNC: <1 MG/DL (ref 3–10)
SODIUM SERPL-SCNC: 140 MMOL/L (ref 135–144)
TROPONIN I SERPL HS-MCNC: 13 NG/L (ref 0–22)
TROPONIN I SERPL HS-MCNC: 17 NG/L (ref 0–22)
WBC OTHER # BLD: 9 K/UL (ref 3.5–11)

## 2024-07-20 PROCEDURE — 85610 PROTHROMBIN TIME: CPT

## 2024-07-20 PROCEDURE — 85025 COMPLETE CBC W/AUTO DIFF WBC: CPT

## 2024-07-20 PROCEDURE — 83735 ASSAY OF MAGNESIUM: CPT

## 2024-07-20 PROCEDURE — 1200000000 HC SEMI PRIVATE

## 2024-07-20 PROCEDURE — 93005 ELECTROCARDIOGRAM TRACING: CPT

## 2024-07-20 PROCEDURE — 80179 DRUG ASSAY SALICYLATE: CPT

## 2024-07-20 PROCEDURE — 2580000003 HC RX 258: Performed by: STUDENT IN AN ORGANIZED HEALTH CARE EDUCATION/TRAINING PROGRAM

## 2024-07-20 PROCEDURE — 6360000004 HC RX CONTRAST MEDICATION: Performed by: STUDENT IN AN ORGANIZED HEALTH CARE EDUCATION/TRAINING PROGRAM

## 2024-07-20 PROCEDURE — 36415 COLL VENOUS BLD VENIPUNCTURE: CPT

## 2024-07-20 PROCEDURE — 96375 TX/PRO/DX INJ NEW DRUG ADDON: CPT

## 2024-07-20 PROCEDURE — 84484 ASSAY OF TROPONIN QUANT: CPT

## 2024-07-20 PROCEDURE — 70450 CT HEAD/BRAIN W/O DYE: CPT

## 2024-07-20 PROCEDURE — 83690 ASSAY OF LIPASE: CPT

## 2024-07-20 PROCEDURE — 82140 ASSAY OF AMMONIA: CPT

## 2024-07-20 PROCEDURE — 99285 EMERGENCY DEPT VISIT HI MDM: CPT

## 2024-07-20 PROCEDURE — 72125 CT NECK SPINE W/O DYE: CPT

## 2024-07-20 PROCEDURE — G0480 DRUG TEST DEF 1-7 CLASSES: HCPCS

## 2024-07-20 PROCEDURE — 71260 CT THORAX DX C+: CPT

## 2024-07-20 PROCEDURE — 96366 THER/PROPH/DIAG IV INF ADDON: CPT

## 2024-07-20 PROCEDURE — 6370000000 HC RX 637 (ALT 250 FOR IP)

## 2024-07-20 PROCEDURE — 6360000002 HC RX W HCPCS

## 2024-07-20 PROCEDURE — 80053 COMPREHEN METABOLIC PANEL: CPT

## 2024-07-20 PROCEDURE — 80143 DRUG ASSAY ACETAMINOPHEN: CPT

## 2024-07-20 PROCEDURE — 2500000003 HC RX 250 WO HCPCS

## 2024-07-20 PROCEDURE — 96365 THER/PROPH/DIAG IV INF INIT: CPT

## 2024-07-20 RX ORDER — 0.9 % SODIUM CHLORIDE 0.9 %
100 INTRAVENOUS SOLUTION INTRAVENOUS ONCE
Status: COMPLETED | OUTPATIENT
Start: 2024-07-20 | End: 2024-07-20

## 2024-07-20 RX ORDER — SODIUM CHLORIDE 0.9 % (FLUSH) 0.9 %
10 SYRINGE (ML) INJECTION PRN
Status: DISCONTINUED | OUTPATIENT
Start: 2024-07-20 | End: 2024-07-29 | Stop reason: HOSPADM

## 2024-07-20 RX ORDER — FENTANYL CITRATE 0.05 MG/ML
50 INJECTION, SOLUTION INTRAMUSCULAR; INTRAVENOUS ONCE
Status: COMPLETED | OUTPATIENT
Start: 2024-07-20 | End: 2024-07-20

## 2024-07-20 RX ORDER — MAGNESIUM SULFATE HEPTAHYDRATE 40 MG/ML
2000 INJECTION, SOLUTION INTRAVENOUS ONCE
Status: COMPLETED | OUTPATIENT
Start: 2024-07-20 | End: 2024-07-21

## 2024-07-20 RX ADMIN — FENTANYL CITRATE 50 MCG: 50 INJECTION INTRAMUSCULAR; INTRAVENOUS at 21:04

## 2024-07-20 RX ADMIN — IOPAMIDOL 100 ML: 755 INJECTION, SOLUTION INTRAVENOUS at 21:55

## 2024-07-20 RX ADMIN — POTASSIUM BICARBONATE 40 MEQ: 782 TABLET, EFFERVESCENT ORAL at 22:09

## 2024-07-20 RX ADMIN — SODIUM CHLORIDE 100 ML: 9 INJECTION, SOLUTION INTRAVENOUS at 21:55

## 2024-07-20 RX ADMIN — SODIUM CHLORIDE, PRESERVATIVE FREE 10 ML: 5 INJECTION INTRAVENOUS at 21:55

## 2024-07-20 RX ADMIN — MAGNESIUM SULFATE HEPTAHYDRATE 2000 MG: 40 INJECTION, SOLUTION INTRAVENOUS at 22:13

## 2024-07-20 ASSESSMENT — PAIN SCALES - GENERAL
PAINLEVEL_OUTOF10: 8
PAINLEVEL_OUTOF10: 8

## 2024-07-20 ASSESSMENT — PAIN - FUNCTIONAL ASSESSMENT
PAIN_FUNCTIONAL_ASSESSMENT: ACTIVITIES ARE NOT PREVENTED
PAIN_FUNCTIONAL_ASSESSMENT: 0-10

## 2024-07-20 ASSESSMENT — PAIN DESCRIPTION - LOCATION
LOCATION: RIB CAGE
LOCATION: RIB CAGE

## 2024-07-20 ASSESSMENT — PAIN DESCRIPTION - DESCRIPTORS
DESCRIPTORS: ACHING
DESCRIPTORS: ACHING

## 2024-07-20 ASSESSMENT — LIFESTYLE VARIABLES
HOW MANY STANDARD DRINKS CONTAINING ALCOHOL DO YOU HAVE ON A TYPICAL DAY: 3 OR 4
HOW OFTEN DO YOU HAVE A DRINK CONTAINING ALCOHOL: 2-4 TIMES A MONTH

## 2024-07-20 ASSESSMENT — PAIN DESCRIPTION - ORIENTATION
ORIENTATION: RIGHT
ORIENTATION: RIGHT

## 2024-07-21 PROBLEM — S22.030D CLOSED WEDGE COMPRESSION FRACTURE OF T3 VERTEBRA WITH ROUTINE HEALING: Status: ACTIVE | Noted: 2024-07-20

## 2024-07-21 LAB
ANION GAP SERPL CALCULATED.3IONS-SCNC: 10 MMOL/L (ref 9–17)
BASOPHILS # BLD: 0 K/UL (ref 0–0.2)
BASOPHILS NFR BLD: 1 % (ref 0–2)
BUN SERPL-MCNC: 5 MG/DL (ref 8–23)
CALCIUM SERPL-MCNC: 7.6 MG/DL (ref 8.6–10.4)
CHLORIDE SERPL-SCNC: 110 MMOL/L (ref 98–107)
CO2 SERPL-SCNC: 22 MMOL/L (ref 20–31)
CREAT SERPL-MCNC: 0.7 MG/DL (ref 0.7–1.2)
EOSINOPHIL # BLD: 0.2 K/UL (ref 0–0.4)
EOSINOPHILS RELATIVE PERCENT: 3 % (ref 0–4)
ERYTHROCYTE [DISTWIDTH] IN BLOOD BY AUTOMATED COUNT: 14.7 % (ref 11.5–14.9)
GFR, ESTIMATED: >90 ML/MIN/1.73M2
GLUCOSE SERPL-MCNC: 86 MG/DL (ref 70–99)
HCT VFR BLD AUTO: 27 % (ref 41–53)
HGB BLD-MCNC: 8.8 G/DL (ref 13.5–17.5)
LYMPHOCYTES NFR BLD: 1.1 K/UL (ref 1–4.8)
LYMPHOCYTES RELATIVE PERCENT: 14 % (ref 24–44)
MAGNESIUM SERPL-MCNC: 1.4 MG/DL (ref 1.6–2.6)
MCH RBC QN AUTO: 30.6 PG (ref 26–34)
MCHC RBC AUTO-ENTMCNC: 32.7 G/DL (ref 31–37)
MCV RBC AUTO: 93.4 FL (ref 80–100)
MONOCYTES NFR BLD: 1.1 K/UL (ref 0.1–1.3)
MONOCYTES NFR BLD: 15 % (ref 1–7)
NEUTROPHILS NFR BLD: 67 % (ref 36–66)
NEUTS SEG NFR BLD: 5.3 K/UL (ref 1.3–9.1)
PLATELET # BLD AUTO: 153 K/UL (ref 150–450)
PMV BLD AUTO: 7.6 FL (ref 6–12)
POTASSIUM SERPL-SCNC: 3.2 MMOL/L (ref 3.7–5.3)
RBC # BLD AUTO: 2.89 M/UL (ref 4.5–5.9)
SODIUM SERPL-SCNC: 142 MMOL/L (ref 135–144)
WBC OTHER # BLD: 7.8 K/UL (ref 3.5–11)

## 2024-07-21 PROCEDURE — 80048 BASIC METABOLIC PNL TOTAL CA: CPT

## 2024-07-21 PROCEDURE — 36415 COLL VENOUS BLD VENIPUNCTURE: CPT

## 2024-07-21 PROCEDURE — 6370000000 HC RX 637 (ALT 250 FOR IP): Performed by: ORTHOPAEDIC SURGERY

## 2024-07-21 PROCEDURE — 6370000000 HC RX 637 (ALT 250 FOR IP)

## 2024-07-21 PROCEDURE — 2500000003 HC RX 250 WO HCPCS: Performed by: NURSE PRACTITIONER

## 2024-07-21 PROCEDURE — 6370000000 HC RX 637 (ALT 250 FOR IP): Performed by: NURSE PRACTITIONER

## 2024-07-21 PROCEDURE — 99222 1ST HOSP IP/OBS MODERATE 55: CPT | Performed by: INTERNAL MEDICINE

## 2024-07-21 PROCEDURE — 6360000002 HC RX W HCPCS: Performed by: NURSE PRACTITIONER

## 2024-07-21 PROCEDURE — 1200000000 HC SEMI PRIVATE

## 2024-07-21 PROCEDURE — 99222 1ST HOSP IP/OBS MODERATE 55: CPT | Performed by: ORTHOPAEDIC SURGERY

## 2024-07-21 PROCEDURE — 2580000003 HC RX 258: Performed by: NURSE PRACTITIONER

## 2024-07-21 PROCEDURE — 6360000002 HC RX W HCPCS: Performed by: ORTHOPAEDIC SURGERY

## 2024-07-21 PROCEDURE — 85025 COMPLETE CBC W/AUTO DIFF WBC: CPT

## 2024-07-21 PROCEDURE — 83735 ASSAY OF MAGNESIUM: CPT

## 2024-07-21 RX ORDER — HYDROCODONE BITARTRATE AND ACETAMINOPHEN 5; 325 MG/1; MG/1
1 TABLET ORAL EVERY 4 HOURS PRN
Status: DISCONTINUED | OUTPATIENT
Start: 2024-07-21 | End: 2024-07-29 | Stop reason: HOSPADM

## 2024-07-21 RX ORDER — OXYCODONE HYDROCHLORIDE 5 MG/1
5 TABLET ORAL ONCE
Status: COMPLETED | OUTPATIENT
Start: 2024-07-21 | End: 2024-07-21

## 2024-07-21 RX ORDER — ACETAMINOPHEN 325 MG/1
650 TABLET ORAL EVERY 6 HOURS PRN
Status: DISCONTINUED | OUTPATIENT
Start: 2024-07-21 | End: 2024-07-29 | Stop reason: HOSPADM

## 2024-07-21 RX ORDER — SODIUM CHLORIDE 9 MG/ML
INJECTION, SOLUTION INTRAVENOUS PRN
Status: DISCONTINUED | OUTPATIENT
Start: 2024-07-21 | End: 2024-07-29 | Stop reason: HOSPADM

## 2024-07-21 RX ORDER — FOLIC ACID 1 MG/1
1 TABLET ORAL DAILY
Status: DISCONTINUED | OUTPATIENT
Start: 2024-07-21 | End: 2024-07-29 | Stop reason: HOSPADM

## 2024-07-21 RX ORDER — PANTOPRAZOLE SODIUM 40 MG/1
40 TABLET, DELAYED RELEASE ORAL 2 TIMES DAILY
Status: DISCONTINUED | OUTPATIENT
Start: 2024-07-21 | End: 2024-07-29 | Stop reason: HOSPADM

## 2024-07-21 RX ORDER — ENOXAPARIN SODIUM 100 MG/ML
30 INJECTION SUBCUTANEOUS ONCE
Status: COMPLETED | OUTPATIENT
Start: 2024-07-21 | End: 2024-07-21

## 2024-07-21 RX ORDER — MORPHINE SULFATE 2 MG/ML
2 INJECTION, SOLUTION INTRAMUSCULAR; INTRAVENOUS
Status: DISCONTINUED | OUTPATIENT
Start: 2024-07-21 | End: 2024-07-29 | Stop reason: HOSPADM

## 2024-07-21 RX ORDER — LIDOCAINE 4 G/G
1 PATCH TOPICAL DAILY
Status: DISCONTINUED | OUTPATIENT
Start: 2024-07-21 | End: 2024-07-29 | Stop reason: HOSPADM

## 2024-07-21 RX ORDER — FERROUS SULFATE 325(65) MG
325 TABLET ORAL 2 TIMES DAILY
Status: DISCONTINUED | OUTPATIENT
Start: 2024-07-21 | End: 2024-07-29 | Stop reason: HOSPADM

## 2024-07-21 RX ORDER — SODIUM CHLORIDE 9 MG/ML
INJECTION, SOLUTION INTRAVENOUS CONTINUOUS
Status: ACTIVE | OUTPATIENT
Start: 2024-07-21 | End: 2024-07-23

## 2024-07-21 RX ORDER — SODIUM CHLORIDE 0.9 % (FLUSH) 0.9 %
5-40 SYRINGE (ML) INJECTION EVERY 12 HOURS SCHEDULED
Status: DISCONTINUED | OUTPATIENT
Start: 2024-07-21 | End: 2024-07-29 | Stop reason: HOSPADM

## 2024-07-21 RX ORDER — LACTULOSE 10 G/15ML
30 SOLUTION ORAL 3 TIMES DAILY
Status: DISCONTINUED | OUTPATIENT
Start: 2024-07-21 | End: 2024-07-22

## 2024-07-21 RX ORDER — POLYETHYLENE GLYCOL 3350 17 G/17G
17 POWDER, FOR SOLUTION ORAL DAILY PRN
Status: DISCONTINUED | OUTPATIENT
Start: 2024-07-21 | End: 2024-07-29 | Stop reason: HOSPADM

## 2024-07-21 RX ORDER — POTASSIUM CHLORIDE 20 MEQ/1
40 TABLET, EXTENDED RELEASE ORAL PRN
Status: DISCONTINUED | OUTPATIENT
Start: 2024-07-21 | End: 2024-07-29 | Stop reason: HOSPADM

## 2024-07-21 RX ORDER — LANOLIN ALCOHOL/MO/W.PET/CERES
3 CREAM (GRAM) TOPICAL NIGHTLY PRN
Status: DISCONTINUED | OUTPATIENT
Start: 2024-07-21 | End: 2024-07-29 | Stop reason: HOSPADM

## 2024-07-21 RX ORDER — LANOLIN ALCOHOL/MO/W.PET/CERES
400 CREAM (GRAM) TOPICAL DAILY
Status: DISCONTINUED | OUTPATIENT
Start: 2024-07-21 | End: 2024-07-22

## 2024-07-21 RX ORDER — SODIUM CHLORIDE 0.9 % (FLUSH) 0.9 %
10 SYRINGE (ML) INJECTION PRN
Status: DISCONTINUED | OUTPATIENT
Start: 2024-07-21 | End: 2024-07-29 | Stop reason: HOSPADM

## 2024-07-21 RX ORDER — HYDROCODONE BITARTRATE AND ACETAMINOPHEN 5; 325 MG/1; MG/1
2 TABLET ORAL EVERY 4 HOURS PRN
Status: DISCONTINUED | OUTPATIENT
Start: 2024-07-21 | End: 2024-07-29 | Stop reason: HOSPADM

## 2024-07-21 RX ORDER — ACETAMINOPHEN 650 MG/1
650 SUPPOSITORY RECTAL EVERY 6 HOURS PRN
Status: DISCONTINUED | OUTPATIENT
Start: 2024-07-21 | End: 2024-07-29 | Stop reason: HOSPADM

## 2024-07-21 RX ORDER — POTASSIUM CHLORIDE 7.45 MG/ML
10 INJECTION INTRAVENOUS PRN
Status: DISCONTINUED | OUTPATIENT
Start: 2024-07-21 | End: 2024-07-29 | Stop reason: HOSPADM

## 2024-07-21 RX ORDER — ONDANSETRON 2 MG/ML
4 INJECTION INTRAMUSCULAR; INTRAVENOUS EVERY 6 HOURS PRN
Status: DISCONTINUED | OUTPATIENT
Start: 2024-07-21 | End: 2024-07-26

## 2024-07-21 RX ORDER — MAGNESIUM SULFATE HEPTAHYDRATE 40 MG/ML
2000 INJECTION, SOLUTION INTRAVENOUS PRN
Status: DISCONTINUED | OUTPATIENT
Start: 2024-07-21 | End: 2024-07-29 | Stop reason: HOSPADM

## 2024-07-21 RX ORDER — BISACODYL 10 MG
10 SUPPOSITORY, RECTAL RECTAL DAILY PRN
Status: DISCONTINUED | OUTPATIENT
Start: 2024-07-21 | End: 2024-07-29 | Stop reason: HOSPADM

## 2024-07-21 RX ORDER — DULOXETIN HYDROCHLORIDE 30 MG/1
30 CAPSULE, DELAYED RELEASE ORAL DAILY
Status: DISCONTINUED | OUTPATIENT
Start: 2024-07-21 | End: 2024-07-29 | Stop reason: HOSPADM

## 2024-07-21 RX ORDER — MAGNESIUM OXIDE 400 MG/1
400 TABLET ORAL DAILY
Status: DISCONTINUED | OUTPATIENT
Start: 2024-07-21 | End: 2024-07-21 | Stop reason: SDUPTHER

## 2024-07-21 RX ADMIN — LACTULOSE 30 G: 20 SOLUTION ORAL at 15:37

## 2024-07-21 RX ADMIN — MORPHINE SULFATE 2 MG: 2 INJECTION, SOLUTION INTRAMUSCULAR; INTRAVENOUS at 20:09

## 2024-07-21 RX ADMIN — SODIUM CHLORIDE, PRESERVATIVE FREE 10 ML: 5 INJECTION INTRAVENOUS at 09:30

## 2024-07-21 RX ADMIN — LACTULOSE 30 G: 20 SOLUTION ORAL at 09:30

## 2024-07-21 RX ADMIN — LACTULOSE 30 G: 20 SOLUTION ORAL at 20:10

## 2024-07-21 RX ADMIN — MORPHINE SULFATE 2 MG: 2 INJECTION, SOLUTION INTRAMUSCULAR; INTRAVENOUS at 15:36

## 2024-07-21 RX ADMIN — ONDANSETRON 4 MG: 2 INJECTION INTRAMUSCULAR; INTRAVENOUS at 20:18

## 2024-07-21 RX ADMIN — SODIUM CHLORIDE: 9 INJECTION, SOLUTION INTRAVENOUS at 01:08

## 2024-07-21 RX ADMIN — FOLIC ACID 1 MG: 1 TABLET ORAL at 09:30

## 2024-07-21 RX ADMIN — SODIUM CHLORIDE: 9 INJECTION, SOLUTION INTRAVENOUS at 20:04

## 2024-07-21 RX ADMIN — MAGNESIUM SULFATE HEPTAHYDRATE 2000 MG: 40 INJECTION, SOLUTION INTRAVENOUS at 11:27

## 2024-07-21 RX ADMIN — PANTOPRAZOLE SODIUM 40 MG: 40 TABLET, DELAYED RELEASE ORAL at 01:11

## 2024-07-21 RX ADMIN — FERROUS SULFATE TAB 325 MG (65 MG ELEMENTAL FE) 325 MG: 325 (65 FE) TAB at 09:30

## 2024-07-21 RX ADMIN — PANTOPRAZOLE SODIUM 40 MG: 40 TABLET, DELAYED RELEASE ORAL at 09:30

## 2024-07-21 RX ADMIN — METOPROLOL TARTRATE 12.5 MG: 25 TABLET, FILM COATED ORAL at 09:30

## 2024-07-21 RX ADMIN — PANTOPRAZOLE SODIUM 40 MG: 40 TABLET, DELAYED RELEASE ORAL at 20:11

## 2024-07-21 RX ADMIN — METOPROLOL TARTRATE 12.5 MG: 25 TABLET, FILM COATED ORAL at 20:11

## 2024-07-21 RX ADMIN — HYDROCODONE BITARTRATE AND ACETAMINOPHEN 2 TABLET: 5; 325 TABLET ORAL at 18:23

## 2024-07-21 RX ADMIN — ENOXAPARIN SODIUM 30 MG: 100 INJECTION SUBCUTANEOUS at 11:24

## 2024-07-21 RX ADMIN — ONDANSETRON 4 MG: 2 INJECTION INTRAMUSCULAR; INTRAVENOUS at 01:43

## 2024-07-21 RX ADMIN — FERROUS SULFATE TAB 325 MG (65 MG ELEMENTAL FE) 325 MG: 325 (65 FE) TAB at 20:11

## 2024-07-21 RX ADMIN — POTASSIUM CHLORIDE 40 MEQ: 1500 TABLET, EXTENDED RELEASE ORAL at 11:02

## 2024-07-21 RX ADMIN — OXYCODONE HYDROCHLORIDE 5 MG: 5 TABLET ORAL at 00:05

## 2024-07-21 RX ADMIN — DULOXETINE HYDROCHLORIDE 30 MG: 30 CAPSULE, DELAYED RELEASE ORAL at 09:30

## 2024-07-21 RX ADMIN — METOPROLOL TARTRATE 12.5 MG: 25 TABLET, FILM COATED ORAL at 01:11

## 2024-07-21 RX ADMIN — Medication 400 MG: at 09:30

## 2024-07-21 RX ADMIN — MORPHINE SULFATE 2 MG: 2 INJECTION, SOLUTION INTRAMUSCULAR; INTRAVENOUS at 09:38

## 2024-07-21 RX ADMIN — MORPHINE SULFATE 2 MG: 2 INJECTION, SOLUTION INTRAMUSCULAR; INTRAVENOUS at 01:08

## 2024-07-21 ASSESSMENT — PAIN SCALES - GENERAL
PAINLEVEL_OUTOF10: 9
PAINLEVEL_OUTOF10: 8
PAINLEVEL_OUTOF10: 10
PAINLEVEL_OUTOF10: 8
PAINLEVEL_OUTOF10: 8
PAINLEVEL_OUTOF10: 6
PAINLEVEL_OUTOF10: 9
PAINLEVEL_OUTOF10: 6
PAINLEVEL_OUTOF10: 6

## 2024-07-21 ASSESSMENT — PAIN DESCRIPTION - ONSET
ONSET: GRADUAL
ONSET: ON-GOING
ONSET: GRADUAL
ONSET: ON-GOING
ONSET: GRADUAL

## 2024-07-21 ASSESSMENT — PAIN DESCRIPTION - LOCATION
LOCATION: RIB CAGE
LOCATION: BACK;RIB CAGE
LOCATION: RIB CAGE

## 2024-07-21 ASSESSMENT — PAIN - FUNCTIONAL ASSESSMENT
PAIN_FUNCTIONAL_ASSESSMENT: PREVENTS OR INTERFERES SOME ACTIVE ACTIVITIES AND ADLS
PAIN_FUNCTIONAL_ASSESSMENT: ACTIVITIES ARE NOT PREVENTED
PAIN_FUNCTIONAL_ASSESSMENT: PREVENTS OR INTERFERES SOME ACTIVE ACTIVITIES AND ADLS

## 2024-07-21 ASSESSMENT — PAIN DESCRIPTION - ORIENTATION
ORIENTATION: RIGHT

## 2024-07-21 ASSESSMENT — PAIN DESCRIPTION - PAIN TYPE
TYPE: ACUTE PAIN

## 2024-07-21 ASSESSMENT — PAIN DESCRIPTION - FREQUENCY
FREQUENCY: CONTINUOUS

## 2024-07-21 ASSESSMENT — PAIN DESCRIPTION - DESCRIPTORS
DESCRIPTORS: ACHING;SORE;SPASM
DESCRIPTORS: DISCOMFORT;ACHING;SORE
DESCRIPTORS: SHARP;DISCOMFORT
DESCRIPTORS: ACHING
DESCRIPTORS: SHARP;SORE;STABBING

## 2024-07-21 ASSESSMENT — PAIN DESCRIPTION - DIRECTION
RADIATING_TOWARDS: DENIES
RADIATING_TOWARDS: DENIES

## 2024-07-21 NOTE — H&P
History and Physical        CHIEF COMPLAINT:  chest pain shortness of breath T3 fracture    HISTORY OF PRESENT ILLNESS:      The patient is a 63 y.o. 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 Mc, orthopedic, every 3-4 months, gets steroid injection    Lezama esophagus     Bleeding gastric varices 12/29/2022    Bowel perforation (HCC) 03/25/2023    BPH (benign prostatic hypertrophy)     Cholelithiasis     Cirrhosis (HCC)     COVID-19 12/2020    pt reports he had a positive test while at [facility] in 2020, was asymptomatic    COVID-19 vaccine series completed 5/20/2021, 6/22/2021    Moderna 5/20/2021, 6/22/2021    DDD (degenerative disc disease), lumbar     Depression     Esophageal adenocarcinoma (HCC)     Esophageal cancer (HCC)     INVASIVE ADENOCARCINOMA ARISING IN TUBULAR ADENOMA WITH HIGH GRADE DYSPLASIA, ASSOCIATED WITH FOCAL INTESTINAL METAPLASIA     Esophageal varices (HCC)     Fatty liver     GERD (gastroesophageal reflux disease)     GI bleed     Heart murmur     Hep C w/o coma, chronic (HCC)     Hiatal hernia     History of alcohol abuse     6-12 beers a day; quit drinking 2019    History of blood transfusion     History of colon polyps 2016    History of tobacco abuse     [place] (hard of hearing)     Hyperlipidemia     Hypertension     Hyponatremia 07/20/2016    Hypotension 12/20/2021    Ileus (HCC) 04/12/2023    Lumbar radiculitis 11/08/2016    Malignant neoplasm of esophagus (HCC) 03/27/2024    Mastoid disorder,  SPECIAL PROCEDURES    KNEE SURGERY Left     cyst removed    LAPAROTOMY N/A 03/25/2023    LAPAROTOMY EXPLORATORY, RIGHT COLECTOMY, CREATION IF END ILEOSTOMY performed by Kvng Waddell DO at [facility] OR    NASAL SEPTUM SURGERY      NASOTRACHEAL SUCTIONING  10/18/2023    NERVE BLOCK Right 11/23/2020    NERVE BLOCK RIGHT CERVICAL STEROID INJECTION  C3-C6 performed by Dheeraj Lau MD at [facility] OR    OTHER SURGICAL HISTORY  01/04/2016    steroid injection C7 T1    OTHER SURGICAL HISTORY  11/21/2016    Bilateral Lumbar CACHORRO L4-L5 injections    OTHER SURGICAL HISTORY  12/19/2016    lumbar steroid injection    OTHER SURGICAL HISTORY  09/28/2018    BILATERAL L5 CACHORRO (N/A Back)    OTHER SURGICAL HISTORY Right 11/23/2020    cervical injection    PAIN MANAGEMENT PROCEDURE Left 07/09/2020    EPIDURAL STEROID INJECTION LEFT L4 L5 performed by Dheeraj Lau MD at [facility] OR    PAIN MANAGEMENT PROCEDURE Left 07/20/2020    LEFT L4 L5 EPIDURAL STEROID INJECTION performed by Dheeraj Lau MD at [facility] OR    PAIN MANAGEMENT PROCEDURE Bilateral 08/17/2020    LUMBAR FACET BILATERAL L2-L5 performed by Dheeraj Lau MD at [facility] OR    PAIN MANAGEMENT PROCEDURE Bilateral 12/07/2020    NERVE BLOCK BILATERAL LUMBAR MEDIAL BRANCH L2-L5 performed by Dheeraj Lau MD at [facility] OR    PARACENTESIS      Multiple    NE NEUROPLASTY &/TRANSPOS MEDIAN NRV CARPAL TUNNE Right 08/29/2017    CARPAL TUNNEL RELEASE RIGHT performed by Curtis Villalobos MD at [facility] OR    NE NEUROPLASTY &/TRANSPOS MEDIAN NRV CARPAL TUNNE Left 10/31/2017    CARPAL TUNNEL RELEASE performed by Curtis Villalobos MD at [facility] OR    NE NJX AA&/STRD TFRML EPI LUMBAR/SACRAL 1 LEVEL Bilateral 09/06/2018    BILATERAL L5 CACHORRO performed by Dheeraj Lau MD at [facility] OR    NE NJX AA&/STRD TFRML EPI LUMBAR/SACRAL 1 LEVEL N/A 09/28/2018    BILATERAL L5 CACHORRO performed by Dheeraj Lau MD at [facility] OR    SMALL INTESTINE SURGERY N/A 10/10/2023    EXPLORATORY LAPAROTOMY, REVERSAL ILEOSTOMY WITH ILEOCOLOSTOMY,

## 2024-07-21 NOTE — CONSULTS
[facility]   IN-PATIENT SERVICE   [facility]    HISTORY AND PHYSICAL EXAMINATION            Date:   7/21/2024  Patient name:  Frank Lisa  Date of admission:  7/20/2024  8:18 PM  MRN:   [id number]  Account:  [id number]  YOB: 1960  PCP:    Lopez Rosario PA  Room:   [facility]  Code Status:    Full Code    Chief Complaint:     Chief Complaint   Patient presents with    Shortness of Breath    Fall     Earlier in the day about 2pm       History Obtained From:     patient    History of Present Illness:     The patient is a 63 y.o.  Non- / non  male who presents with Shortness of Breath and Fall (Earlier in the day about 2pm)   and he is admitted to the hospital for the management of      Patient is 63-year-old male with past medical history of atrial fibrillation not on any anticoagulation due to history of liver cirrhosis and GI bleed, COPD, alcoholic liver cirrhosis s/p TIPS procedure hepatitis C electrolyte abnormality, history of esophageal cancer and remission, previous history of bowel perforation with extensive bowel surgery, and recently had ventral hernia repair and SBO presented to the ER after fall.  Patient states that he tripped and fell, patient not sure if he passed out or not,    Patient had pan CT, found to have T3 fracture, admitted under trauma, medicine consulted for medical management.  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 Mc, orthopedic, every 3-4 months, gets steroid injection    Lezama esophagus     Bleeding gastric varices 12/29/2022    Bowel  etoh  Decision Support Exception - unselect if not a suspected or confirmed  emergency medical condition->Emergency Medical Condition (MA)  Reason for Exam: fall, etoh    FINDINGS:  BONES/ALIGNMENT: There is no acute fracture or traumatic malalignment.    DEGENERATIVE CHANGES: No significant degenerative changes.    SOFT TISSUES: There is no prevertebral soft tissue swelling.  Mild paraseptal  emphysema at the lung apices.  Impression: No acute abnormality of the cervical spine.       Assessment :      Primary Problem  Closed wedge compression fracture of T3 vertebra with routine healing    Active Hospital Problems    Diagnosis Date Noted    Closed wedge compression fracture of T3 vertebra with routine healing [S22.030D] 07/20/2024       Plan:     Patient status Admit as inpatient in the  Progressive Unit/Step down    Patient is 63-year-old male with multiple comorbidities presented to the hospital with fall and questionable syncopal episode, found to have likely T3 fracture admitted under trauma, medicine team has been consulted for medical management patient to get MRI   Presyncope/syncopal episode, patient positive for alcohol, could be secondary to intoxication, manage check orthostats check echocardiogram current does have history of A-fib currently in sinus, head CT was done nonacute  Liver cirrhosis secondary to alcohol use and hepatitis s/p TIPS, ammonia was checked normal, on lactulose  Alcohol abuse, watch for withdrawal patient was having visible tremors, but otherwise oriented, stated that he always had direct ammonia was checked no next  Anemia of chronic disease hemoglobin dropped to 8.8 likely secondary to leg trauma, pan CT reviewed  Recently had SBO and ventral hernia repair, abdominal CT done nonacute  RCRI 0, but due to his history of liver disease s/p TIPS GI bleed, patient is at moderate to high risk for any surgical procedure.,  INR is 1.5, platelet count stable  Hypokalemia and hypomagnesemia

## 2024-07-21 NOTE — ED PROVIDER NOTES
[facility] ED  Emergency Department Encounter  Emergency Medicine Resident     Pt Name:Frank Lisa  MRN: [id number]  Birthdate 1960  Date of evaluation: 7/20/24  PCP:  Lopez Rsoario PA  Note Started: 8:26 PM EDT      CHIEF COMPLAINT       Chief Complaint   Patient presents with    Shortness of Breath    Fall     Earlier in the day about 2pm       HISTORY OF PRESENT ILLNESS  (Location/Symptom, Timing/Onset, Context/Setting, Quality, Duration, Modifying Factors, Severity.)      Frank Lisa is a 63 y.o. male who presents with right upper quadrant pain and lower back pain.  Patient reports passing out and falling yesterday.  Patient reports he passed out in the past due to electrolyte imbalances.  Patient reports having recent hernia repair surgery.    Denies any fever, cough, congestion, chest pain, shortness of breath, nausea, vomiting, numbness, tingling, weakness, urinary or bowel complaints.    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), Alcoholic cirrhosis of liver with ascites (HCC), Anemia, Anxiety, Arthritis, Back pain, chronic, Lezama esophagus, Bleeding gastric varices, Bowel perforation (HCC), BPH (benign prostatic hypertrophy), Cholelithiasis, Cirrhosis (HCC), COVID-19, COVID-19 vaccine series completed, DDD (degenerative disc disease), lumbar, Depression, Esophageal adenocarcinoma (HCC), Esophageal cancer (HCC), Esophageal varices (HCC), Fatty liver, GERD (gastroesophageal reflux disease), GI bleed, Heart murmur, Hep C w/o coma, chronic (HCC), Hiatal hernia, History of alcohol abuse, History of blood transfusion, History of colon polyps, History of tobacco abuse, [place] (hard of hearing), Hyperlipidemia, Hypertension, Hyponatremia, Hypotension, Ileus (HCC), Lumbar radiculitis, Malignant neoplasm of esophagus (HCC),

## 2024-07-21 NOTE — ED TRIAGE NOTES
Mode of arrival (squad #, walk in, police, etc) : EMS        Chief complaint(s): fall, rib injury        Arrival Note (brief scenario, treatment PTA, etc).: patient was at home, had a fall about 2pm. He is complaining of right sided rib cage pain, aching, 9/10. Patient is hyperverbal. Patient reports dizziness.

## 2024-07-21 NOTE — PLAN OF CARE
Please fill out the electronic MRI Screening Form with the patient, or the patient representative.   Any questions..please call [facility] MRI @ [phone].  MRI exam will be scheduled after receiving the completed screening form. Thank you!!

## 2024-07-21 NOTE — CARE COORDINATION
Case Management Assessment  Initial Evaluation    Date/Time of Evaluation: 7/21/2024 2:32 PM  Assessment Completed by: Mary Ellen Resendez RN    If patient is discharged prior to next notation, then this note serves as note for discharge by case management.    Patient Name: Frank Lisa                   YOB: 1960  Diagnosis: Hypokalemia [E87.6]  Hypomagnesemia [E83.42]  Closed fracture of posterior thoracic vertebral body, initial encounter (Formerly [facility]) [S22.009A]  Other closed fracture of third thoracic vertebra, initial encounter (Formerly [facility]) [S22.038A]                   Date / Time: 7/20/2024  8:18 PM    Patient Admission Status: Inpatient   Readmission Risk (Low < 19, Mod (19-27), High > 27): Readmission Risk Score: 44    Current PCP: Lopez Rosario PA  PCP verified by CM?      Chart Reviewed: Yes      History Provided by: Patient  Patient Orientation: Alert and Oriented    Patient Cognition: Alert    Hospitalization in the last 30 days (Readmission):  No    If yes, Readmission Assessment in CM Navigator will be completed.    Advance Directives:      Code Status: Full Code   Patient's Primary Decision Maker is: Named in Scanned ACP Document    Primary Decision Maker: Nilam Lisa - Ex-Spouse - [phone]    Discharge Planning:    Patient lives with: Alone Type of Home: House  Primary Care Giver: Self  Patient Support Systems include: Family Members   Current Financial resources: Medicare, Medicaid  Current community resources: None  Current services prior to admission: Durable Medical Equipment            Current DME: Cane            Type of Home Care services:  None    ADLS  Prior functional level: Independent in ADLs/IADLs  Current functional level: Independent in ADLs/IADLs    PT AM-PAC:   /24  OT AM-PAC:   /24    Family can provide assistance at DC: No  Would you like Case Management to discuss the discharge plan with any other family members/significant others, and if so, who? No  Plans to Return

## 2024-07-21 NOTE — PLAN OF CARE
Problem: Discharge Planning  Goal: Discharge to home or other facility with appropriate resources  Outcome: Progressing  Flowsheets  Taken 7/21/2024 1707  Discharge to home or other facility with appropriate resources:   Identify barriers to discharge with patient and caregiver   Arrange for needed discharge resources and transportation as appropriate   Identify discharge learning needs (meds, wound care, etc)  Taken 7/21/2024 0800  Discharge to home or other facility with appropriate resources:   Identify barriers to discharge with patient and caregiver   Arrange for needed discharge resources and transportation as appropriate   Identify discharge learning needs (meds, wound care, etc)   Refer to discharge planning if patient needs post-hospital services based on physician order or complex needs related to functional status, cognitive ability or social support system     Problem: Pain  Goal: Verbalizes/displays adequate comfort level or baseline comfort level  Outcome: Progressing  Flowsheets (Taken 7/21/2024 1707)  Verbalizes/displays adequate comfort level or baseline comfort level:   Encourage patient to monitor pain and request assistance   Assess pain using appropriate pain scale   Administer analgesics based on type and severity of pain and evaluate response     Problem: Safety - Adult  Goal: Free from fall injury  Outcome: Progressing     Problem: ABCDS Injury Assessment  Goal: Absence of physical injury  Outcome: Progressing

## 2024-07-21 NOTE — PLAN OF CARE
Problem: Discharge Planning  Goal: Discharge to home or other facility with appropriate resources  Outcome: Progressing  Flowsheets (Taken 7/21/2024 0140)  Discharge to home or other facility with appropriate resources:   Identify barriers to discharge with patient and caregiver   Arrange for needed discharge resources and transportation as appropriate   Identify discharge learning needs (meds, wound care, etc)   Refer to discharge planning if patient needs post-hospital services based on physician order or complex needs related to functional status, cognitive ability or social support system     Problem: Pain  Goal: Verbalizes/displays adequate comfort level or baseline comfort level  Outcome: Progressing  Flowsheets (Taken 7/21/2024 0140)  Verbalizes/displays adequate comfort level or baseline comfort level:   Encourage patient to monitor pain and request assistance   Assess pain using appropriate pain scale   Administer analgesics based on type and severity of pain and evaluate response   Implement non-pharmacological measures as appropriate and evaluate response   Consider cultural and social influences on pain and pain management   Notify Licensed Independent Practitioner if interventions unsuccessful or patient reports new pain     Problem: Safety - Adult  Goal: Free from fall injury  Outcome: Progressing     Problem: ABCDS Injury Assessment  Goal: Absence of physical injury  Outcome: Progressing  Flowsheets (Taken 7/21/2024 0140)  Absence of Physical Injury: Implement safety measures based on patient assessment

## 2024-07-21 NOTE — ED PROVIDER NOTES
EMERGENCY DEPARTMENT ENCOUNTER   ATTENDING ATTESTATION     Pt Name: Frank Lisa  MRN: [id number]  Birthdate 1960  Date of evaluation: 7/20/24       Frank Lisa is a 63 y.o. male who presents with Shortness of Breath and Fall (Earlier in the day about 2pm)    States he passed out last night    Complaining of some pain on his right side does have an abrasion to the head    Plan is syncope workup and trauma scans    MDM:     Workup is largely reassuring patient does have a T3 teardrop fracture.  No numbness tingling weakness or other symptoms to suggest cord compression    Admitted to orthopedic spinal surgery with medicine consultation    Vitals:   Vitals:    07/20/24 2104 07/20/24 2114 07/20/24 2158 07/20/24 2245   BP:  (!) 138/92 (!) 180/79 (!) 167/83   Pulse:       Resp: 16      Temp:       TempSrc:       SpO2:  98% 100%    Weight:       Height:             I personally saw and examined the patient. I have reviewed and agree with the resident's findings, including all diagnostic interpretations and treatment plan as written. I was present for the key portions of any procedures performed and the inclusive time noted for any critical care statement.    Sigifredo Casillas MD  Attending Emergency Physician           Sigifredo Casillas MD  07/20/24 2287

## 2024-07-21 NOTE — ED NOTES
Report given to JERI Arizmendi from [facility].   Report method by phone   The following was reviewed with receiving RN:   Current vital signs:  BP (!) 157/84   Pulse 91   Temp 98 °F (36.7 °C) (Oral)   Resp 14   Ht 1.753 m (5' 9\")   Wt 77.1 kg (170 lb)   SpO2 99%   BMI 25.10 kg/m²                      Any medication or safety alerts were reviewed. Any pending diagnostics and notifications were also reviewed, as well as any safety concerns or issues, abnormal labs, abnormal imaging, and abnormal assessment findings. Questions were answered.

## 2024-07-22 ENCOUNTER — APPOINTMENT (OUTPATIENT)
Age: 64
DRG: 479 | End: 2024-07-22
Attending: INTERNAL MEDICINE
Payer: COMMERCIAL

## 2024-07-22 ENCOUNTER — APPOINTMENT (OUTPATIENT)
Dept: MRI IMAGING | Age: 64
DRG: 479 | End: 2024-07-22
Payer: COMMERCIAL

## 2024-07-22 ENCOUNTER — ANESTHESIA EVENT (OUTPATIENT)
Dept: OPERATING ROOM | Age: 64
End: 2024-07-22
Payer: COMMERCIAL

## 2024-07-22 LAB
25(OH)D3 SERPL-MCNC: 14.5 NG/ML (ref 30–100)
ANION GAP SERPL CALCULATED.3IONS-SCNC: 7 MMOL/L (ref 9–17)
BASOPHILS # BLD: 0.07 K/UL (ref 0–0.2)
BASOPHILS NFR BLD: 1 % (ref 0–2)
BUN SERPL-MCNC: 5 MG/DL (ref 8–23)
CALCIUM SERPL-MCNC: 7.7 MG/DL (ref 8.6–10.4)
CHLORIDE SERPL-SCNC: 106 MMOL/L (ref 98–107)
CO2 SERPL-SCNC: 25 MMOL/L (ref 20–31)
CREAT SERPL-MCNC: 0.7 MG/DL (ref 0.7–1.2)
EOSINOPHIL # BLD: 0.47 K/UL (ref 0–0.4)
EOSINOPHILS RELATIVE PERCENT: 7 % (ref 0–4)
ERYTHROCYTE [DISTWIDTH] IN BLOOD BY AUTOMATED COUNT: 14.3 % (ref 11.5–14.9)
GFR, ESTIMATED: >90 ML/MIN/1.73M2
GLUCOSE SERPL-MCNC: 96 MG/DL (ref 70–99)
HCT VFR BLD AUTO: 26.8 % (ref 41–53)
HGB BLD-MCNC: 9.1 G/DL (ref 13.5–17.5)
INR PPP: 1.6
LYMPHOCYTES NFR BLD: 0.87 K/UL (ref 1–4.8)
LYMPHOCYTES RELATIVE PERCENT: 13 % (ref 24–44)
MAGNESIUM SERPL-MCNC: 1.4 MG/DL (ref 1.6–2.6)
MCH RBC QN AUTO: 31.3 PG (ref 26–34)
MCHC RBC AUTO-ENTMCNC: 33.8 G/DL (ref 31–37)
MCV RBC AUTO: 92.6 FL (ref 80–100)
MONOCYTES NFR BLD: 1.07 K/UL (ref 0.1–1.3)
MONOCYTES NFR BLD: 16 % (ref 1–7)
MORPHOLOGY: ABNORMAL
NEUTROPHILS NFR BLD: 63 % (ref 36–66)
NEUTS SEG NFR BLD: 4.22 K/UL (ref 1.3–9.1)
PLATELET # BLD AUTO: 135 K/UL (ref 150–450)
PMV BLD AUTO: 8.1 FL (ref 6–12)
POTASSIUM SERPL-SCNC: 3.4 MMOL/L (ref 3.7–5.3)
PROTHROMBIN TIME: 18.9 SEC (ref 11.8–14.6)
RBC # BLD AUTO: 2.9 M/UL (ref 4.5–5.9)
SODIUM SERPL-SCNC: 138 MMOL/L (ref 135–144)
WBC OTHER # BLD: 6.7 K/UL (ref 3.5–11)

## 2024-07-22 PROCEDURE — 6360000002 HC RX W HCPCS: Performed by: NURSE PRACTITIONER

## 2024-07-22 PROCEDURE — 85025 COMPLETE CBC W/AUTO DIFF WBC: CPT

## 2024-07-22 PROCEDURE — 2500000003 HC RX 250 WO HCPCS: Performed by: NURSE PRACTITIONER

## 2024-07-22 PROCEDURE — 6370000000 HC RX 637 (ALT 250 FOR IP): Performed by: ORTHOPAEDIC SURGERY

## 2024-07-22 PROCEDURE — 6370000000 HC RX 637 (ALT 250 FOR IP): Performed by: INTERNAL MEDICINE

## 2024-07-22 PROCEDURE — 72146 MRI CHEST SPINE W/O DYE: CPT

## 2024-07-22 PROCEDURE — 1200000000 HC SEMI PRIVATE

## 2024-07-22 PROCEDURE — 80048 BASIC METABOLIC PNL TOTAL CA: CPT

## 2024-07-22 PROCEDURE — 93306 TTE W/DOPPLER COMPLETE: CPT

## 2024-07-22 PROCEDURE — 6370000000 HC RX 637 (ALT 250 FOR IP): Performed by: NURSE PRACTITIONER

## 2024-07-22 PROCEDURE — 82306 VITAMIN D 25 HYDROXY: CPT

## 2024-07-22 PROCEDURE — 6360000002 HC RX W HCPCS: Performed by: INTERNAL MEDICINE

## 2024-07-22 PROCEDURE — 6370000000 HC RX 637 (ALT 250 FOR IP)

## 2024-07-22 PROCEDURE — 85610 PROTHROMBIN TIME: CPT

## 2024-07-22 PROCEDURE — 36415 COLL VENOUS BLD VENIPUNCTURE: CPT

## 2024-07-22 PROCEDURE — 99232 SBSQ HOSP IP/OBS MODERATE 35: CPT | Performed by: INTERNAL MEDICINE

## 2024-07-22 PROCEDURE — 83735 ASSAY OF MAGNESIUM: CPT

## 2024-07-22 PROCEDURE — 2580000003 HC RX 258: Performed by: NURSE PRACTITIONER

## 2024-07-22 RX ORDER — POTASSIUM CHLORIDE 20 MEQ/1
10 TABLET, EXTENDED RELEASE ORAL 2 TIMES DAILY
Status: DISCONTINUED | OUTPATIENT
Start: 2024-07-22 | End: 2024-07-29 | Stop reason: HOSPADM

## 2024-07-22 RX ORDER — LANOLIN ALCOHOL/MO/W.PET/CERES
400 CREAM (GRAM) TOPICAL 2 TIMES DAILY
Status: DISCONTINUED | OUTPATIENT
Start: 2024-07-22 | End: 2024-07-29 | Stop reason: HOSPADM

## 2024-07-22 RX ORDER — LORAZEPAM 2 MG/ML
1 INJECTION INTRAMUSCULAR ONCE
Status: COMPLETED | OUTPATIENT
Start: 2024-07-22 | End: 2024-07-22

## 2024-07-22 RX ORDER — LACTULOSE 10 G/15ML
20 SOLUTION ORAL DAILY
Status: DISCONTINUED | OUTPATIENT
Start: 2024-07-23 | End: 2024-07-29 | Stop reason: HOSPADM

## 2024-07-22 RX ADMIN — ONDANSETRON 4 MG: 2 INJECTION INTRAMUSCULAR; INTRAVENOUS at 02:29

## 2024-07-22 RX ADMIN — POTASSIUM CHLORIDE 10 MEQ: 1500 TABLET, EXTENDED RELEASE ORAL at 20:58

## 2024-07-22 RX ADMIN — FOLIC ACID 1 MG: 1 TABLET ORAL at 08:22

## 2024-07-22 RX ADMIN — PANTOPRAZOLE SODIUM 40 MG: 40 TABLET, DELAYED RELEASE ORAL at 20:58

## 2024-07-22 RX ADMIN — ONDANSETRON 4 MG: 2 INJECTION INTRAMUSCULAR; INTRAVENOUS at 08:25

## 2024-07-22 RX ADMIN — FERROUS SULFATE TAB 325 MG (65 MG ELEMENTAL FE) 325 MG: 325 (65 FE) TAB at 08:22

## 2024-07-22 RX ADMIN — SODIUM CHLORIDE, PRESERVATIVE FREE 10 ML: 5 INJECTION INTRAVENOUS at 08:25

## 2024-07-22 RX ADMIN — SODIUM CHLORIDE, PRESERVATIVE FREE 10 ML: 5 INJECTION INTRAVENOUS at 20:59

## 2024-07-22 RX ADMIN — LORAZEPAM 1 MG: 2 INJECTION INTRAMUSCULAR; INTRAVENOUS at 11:36

## 2024-07-22 RX ADMIN — Medication 400 MG: at 08:21

## 2024-07-22 RX ADMIN — METOPROLOL TARTRATE 12.5 MG: 25 TABLET, FILM COATED ORAL at 20:58

## 2024-07-22 RX ADMIN — HYDROCODONE BITARTRATE AND ACETAMINOPHEN 2 TABLET: 5; 325 TABLET ORAL at 21:04

## 2024-07-22 RX ADMIN — METOPROLOL TARTRATE 12.5 MG: 25 TABLET, FILM COATED ORAL at 08:21

## 2024-07-22 RX ADMIN — Medication 400 MG: at 20:58

## 2024-07-22 RX ADMIN — POTASSIUM CHLORIDE 40 MEQ: 1500 TABLET, EXTENDED RELEASE ORAL at 09:59

## 2024-07-22 RX ADMIN — HYDROCODONE BITARTRATE AND ACETAMINOPHEN 2 TABLET: 5; 325 TABLET ORAL at 02:28

## 2024-07-22 RX ADMIN — PANTOPRAZOLE SODIUM 40 MG: 40 TABLET, DELAYED RELEASE ORAL at 08:21

## 2024-07-22 RX ADMIN — MAGNESIUM SULFATE HEPTAHYDRATE 2000 MG: 40 INJECTION, SOLUTION INTRAVENOUS at 10:03

## 2024-07-22 RX ADMIN — HYDROCODONE BITARTRATE AND ACETAMINOPHEN 2 TABLET: 5; 325 TABLET ORAL at 12:29

## 2024-07-22 RX ADMIN — POTASSIUM CHLORIDE 10 MEQ: 1500 TABLET, EXTENDED RELEASE ORAL at 11:05

## 2024-07-22 RX ADMIN — MORPHINE SULFATE 2 MG: 2 INJECTION, SOLUTION INTRAMUSCULAR; INTRAVENOUS at 05:40

## 2024-07-22 RX ADMIN — DULOXETINE HYDROCHLORIDE 30 MG: 30 CAPSULE, DELAYED RELEASE ORAL at 08:21

## 2024-07-22 RX ADMIN — HYDROCODONE BITARTRATE AND ACETAMINOPHEN 2 TABLET: 5; 325 TABLET ORAL at 08:22

## 2024-07-22 RX ADMIN — FERROUS SULFATE TAB 325 MG (65 MG ELEMENTAL FE) 325 MG: 325 (65 FE) TAB at 20:58

## 2024-07-22 ASSESSMENT — PAIN DESCRIPTION - PAIN TYPE
TYPE: ACUTE PAIN

## 2024-07-22 ASSESSMENT — PAIN DESCRIPTION - LOCATION
LOCATION: RIB CAGE
LOCATION: BACK
LOCATION: RIB CAGE
LOCATION: RIB CAGE

## 2024-07-22 ASSESSMENT — PAIN SCALES - GENERAL
PAINLEVEL_OUTOF10: 8
PAINLEVEL_OUTOF10: 9
PAINLEVEL_OUTOF10: 2
PAINLEVEL_OUTOF10: 9
PAINLEVEL_OUTOF10: 4
PAINLEVEL_OUTOF10: 4
PAINLEVEL_OUTOF10: 6
PAINLEVEL_OUTOF10: 9
PAINLEVEL_OUTOF10: 8
PAINLEVEL_OUTOF10: 6
PAINLEVEL_OUTOF10: 2
PAINLEVEL_OUTOF10: 5

## 2024-07-22 ASSESSMENT — PAIN DESCRIPTION - ONSET
ONSET: AWAKENED FROM SLEEP
ONSET: ON-GOING

## 2024-07-22 ASSESSMENT — PAIN DESCRIPTION - ORIENTATION
ORIENTATION: RIGHT

## 2024-07-22 ASSESSMENT — PAIN - FUNCTIONAL ASSESSMENT
PAIN_FUNCTIONAL_ASSESSMENT: ACTIVITIES ARE NOT PREVENTED
PAIN_FUNCTIONAL_ASSESSMENT: PREVENTS OR INTERFERES SOME ACTIVE ACTIVITIES AND ADLS
PAIN_FUNCTIONAL_ASSESSMENT: PREVENTS OR INTERFERES WITH MANY ACTIVE NOT PASSIVE ACTIVITIES

## 2024-07-22 ASSESSMENT — PAIN DESCRIPTION - DESCRIPTORS
DESCRIPTORS: DISCOMFORT;SORE;TENDER
DESCRIPTORS: ACHING;PRESSURE
DESCRIPTORS: ACHING;DISCOMFORT;SORE;TENDER
DESCRIPTORS: SHARP;ACHING;SORE;TENDER
DESCRIPTORS: ACHING;SORE;TENDER

## 2024-07-22 ASSESSMENT — PAIN DESCRIPTION - FREQUENCY
FREQUENCY: CONTINUOUS

## 2024-07-22 ASSESSMENT — PAIN DESCRIPTION - DIRECTION
RADIATING_TOWARDS: DENIES
RADIATING_TOWARDS: DENIES

## 2024-07-22 NOTE — FLOWSHEET NOTE
07/22/24 1700   Vital Signs   Blood Pressure Lying 125/71   Pulse Lying 68 PER MINUTE   Blood Pressure Sitting 114/69   Pulse Sitting 72 PER MINUTE   Blood Pressure Standing 107/65   Pulse Standing 78 PER MINUTE       Ortho's completed this shift per order

## 2024-07-22 NOTE — PROGRESS NOTES
[facility]   IN-PATIENT SERVICE   [facility]    HISTORY AND PHYSICAL EXAMINATION            Date:   7/22/2024  Patient name:  Frank Lisa  Date of admission:  7/20/2024  8:18 PM  MRN:   [id number]  Account:  [id number]  YOB: 1960  PCP:    Lopez Rosario PA  Room:   [facility]  Code Status:    Full Code    Chief Complaint:     Chief Complaint   Patient presents with    Shortness of Breath    Fall     Earlier in the day about 2pm       History Obtained From:     patient    History of Present Illness:     The patient is a 63 y.o.  Non- / non  male who presents with Shortness of Breath and Fall (Earlier in the day about 2pm)   and he is admitted to the hospital for the management of      Patient is 63-year-old male with past medical history of atrial fibrillation not on any anticoagulation due to history of liver cirrhosis and GI bleed, COPD, alcoholic liver cirrhosis s/p TIPS procedure hepatitis C electrolyte abnormality, history of esophageal cancer and remission, previous history of bowel perforation with extensive bowel surgery, and recently had ventral hernia repair and SBO presented to the ER after fall.  Patient states that he tripped and fell, patient not sure if he passed out or not,    Patient had pan CT, found to have T3 fracture, admitted under trauma, medicine consulted for medical management.  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 Mc, orthopedic, every 3-4 months, gets steroid injection    Lezama esophagus     Bleeding gastric varices 12/29/2022    Bowel

## 2024-07-22 NOTE — PROGRESS NOTES
Surgical Progress Note    POD:      Patient doing fairly well  Vitals:    24 1259   BP:    Pulse:    Resp: 17   Temp:    SpO2:       Temp (24hrs), Av.4 °F (36.9 °C), Min:98.1 °F (36.7 °C), Max:98.7 °F (37.1 °C)       Pain Control no change    No unusual nausea    Exam: no change    K still low        Lungs:  No respiratory distress    Labs reviewed:  Labs:  WBC/Hgb/Hct/Plts:  6.7/9.1/26.8/135 (732)  BUN/Cr/glu/ALT/AST/amyl/lip:  5/0.7/--/--/--/--/-- (732)  PT/INR/PTT:  18.4/1.5/-- (2104)  Na/K/Cl/CO2:  138/3.4/106/25 (732)    I/O last 3 completed shifts:  In: 1248.2 [P.O.:240; I.V.:1008.2]  Out: 1575 [Urine:1575]    Assessment:    Patient Active Problem List   Diagnosis    Back pain, chronic    Hearing difficulty    GERD (gastroesophageal reflux disease)    Cervical radicular pain    Hypomagnesemia    Chronic viral hepatitis B without delta agent and without coma (HCC)    Calculus of gallbladder without cholecystitis    Hep C w/o coma, chronic (HCC)    Fatty liver    Psychophysiologic insomnia    Cirrhosis (HCC)    Decompensation of cirrhosis of liver (HCC)    Vertebrogenic low back pain    DDD (degenerative disc disease), lumbar    Depression    Tubular adenoma of colon    History of colon polyps    Gynecomastia, male    Lumbar disc herniation    Tinnitus    Eustachian tube dysfunction    Ganglion cyst    Carpal tunnel syndrome of right wrist    History of hepatitis C    Vitamin D deficiency    Pure hypercholesterolemia    Hypokalemia    Essential hypertension    Recurrent major depressive disorder in partial remission (HCC)    S/P epidural steroid injection    Elevated LFTs    Seasonal allergies    Lumbar facet arthropathy    Cervical facet syndrome    Thrombocytopenia (HCC)    Hepatitis C virus infection resolved after antiviral drug therapy    Alcohol abuse    Altered mental status    Hypocalcemia    Hypophosphatemia    Malignant neoplasm of lower third of esophagus (HCC)     Dizziness    Acute pancreatitis without infection or necrosis    Small bowel obstruction (HCC)    Closed wedge compression fracture of T3 vertebra with routine healing       Plan:  See my orders    Tentatively schedule kyphoplasty for tomorrow    May end up canceling if electrolytes are not normalizing    Júnior Cueva MD MD  7/22/2024 5:05 PM

## 2024-07-22 NOTE — CARE COORDINATION
DISCHARGE PLANNING NOTE:    Attempted to speak with patient, however he was out of the room.    Plan per previous discharge planner is home with [facility].    MRI thoracic spine and 2D echo ordered today.    Will continue to follow for additional discharge needs.    Electronically signed by Leonila Chapa RN on 7/22/2024 at 12:54 PM

## 2024-07-22 NOTE — PLAN OF CARE
Problem: Discharge Planning  Goal: Discharge to home or other facility with appropriate resources  7/22/2024 0141 by Ugo Stovall, RN  Outcome: Progressing  Flowsheets (Taken 7/22/2024 0141)  Discharge to home or other facility with appropriate resources:   Identify barriers to discharge with patient and caregiver   Arrange for needed discharge resources and transportation as appropriate   Identify discharge learning needs (meds, wound care, etc)   Refer to discharge planning if patient needs post-hospital services based on physician order or complex needs related to functional status, cognitive ability or social support system     Problem: Pain  Goal: Verbalizes/displays adequate comfort level or baseline comfort level  7/22/2024 0141 by Ugo Stovall, RN  Outcome: Progressing  Flowsheets (Taken 7/22/2024 0141)  Verbalizes/displays adequate comfort level or baseline comfort level:   Encourage patient to monitor pain and request assistance   Assess pain using appropriate pain scale   Administer analgesics based on type and severity of pain and evaluate response   Implement non-pharmacological measures as appropriate and evaluate response   Consider cultural and social influences on pain and pain management   Notify Licensed Independent Practitioner if interventions unsuccessful or patient reports new pain     Problem: Safety - Adult  Goal: Free from fall injury  7/22/2024 0141 by Ugo Stovall, RN  Outcome: Progressing     Problem: ABCDS Injury Assessment  Goal: Absence of physical injury  7/22/2024 0141 by Ugo Stovall, RN  Outcome: Progressing  Flowsheets (Taken 7/22/2024 0141)  Absence of Physical Injury: Implement safety measures based on patient assessment

## 2024-07-22 NOTE — PLAN OF CARE
Problem: Pain  Goal: Verbalizes/displays adequate comfort level or baseline comfort level  Outcome: Progressing  Flowsheets (Taken 7/22/2024 1706)  Verbalizes/displays adequate comfort level or baseline comfort level:   Encourage patient to monitor pain and request assistance   Implement non-pharmacological measures as appropriate and evaluate response   Notify Licensed Independent Practitioner if interventions unsuccessful or patient reports new pain  Note: Patient able to express need of pain medication in an appropriate manner.     Problem: Safety - Adult  Goal: Free from fall injury  Outcome: Progressing  Flowsheets (Taken 7/22/2024 1706)  Free From Fall Injury:   Instruct family/caregiver on patient safety   Based on caregiver fall risk screen, instruct family/caregiver to ask for assistance with transferring infant if caregiver noted to have fall risk factors  Note: Patient has been safe during his hospital stay, no falls noted.     Problem: Skin/Tissue Integrity  Goal: Absence of new skin breakdown  Description: 1.  Monitor for areas of redness and/or skin breakdown  2.  Assess vascular access sites hourly  3.  Every 4-6 hours minimum:  Change oxygen saturation probe site  4.  Every 4-6 hours:  If on nasal continuous positive airway pressure, respiratory therapy assess nares and determine need for appliance change or resting period.  Outcome: Progressing  Note: Patient's skin has not new breakdown noted on exam today

## 2024-07-22 NOTE — PROGRESS NOTES
Dr chowdary notified of pts increased agitated and irritability. New order for one time ativan placed by doctor.

## 2024-07-23 ENCOUNTER — ANESTHESIA (OUTPATIENT)
Dept: OPERATING ROOM | Age: 64
End: 2024-07-23
Payer: COMMERCIAL

## 2024-07-23 LAB
ANION GAP SERPL CALCULATED.3IONS-SCNC: 7 MMOL/L (ref 9–17)
BASOPHILS # BLD: 0 K/UL (ref 0–0.2)
BASOPHILS NFR BLD: 0 % (ref 0–2)
BUN SERPL-MCNC: 5 MG/DL (ref 8–23)
CALCIUM SERPL-MCNC: 8.2 MG/DL (ref 8.6–10.4)
CHLORIDE SERPL-SCNC: 109 MMOL/L (ref 98–107)
CO2 SERPL-SCNC: 23 MMOL/L (ref 20–31)
CREAT SERPL-MCNC: 0.8 MG/DL (ref 0.7–1.2)
ECHO AO ROOT DIAM: 3 CM
ECHO AO ROOT INDEX: 1.51 CM/M2
ECHO AV AREA PEAK VELOCITY: 1.8 CM2
ECHO AV AREA VTI: 1.9 CM2
ECHO AV AREA/BSA PEAK VELOCITY: 0.9 CM2/M2
ECHO AV AREA/BSA VTI: 1 CM2/M2
ECHO AV MEAN GRADIENT: 5 MMHG
ECHO AV MEAN VELOCITY: 1.1 M/S
ECHO AV PEAK GRADIENT: 9 MMHG
ECHO AV PEAK VELOCITY: 1.5 M/S
ECHO AV VELOCITY RATIO: 0.73
ECHO AV VTI: 34.6 CM
ECHO BSA: 1.94 M2
ECHO EST RA PRESSURE: 8 MMHG
ECHO LA AREA 2C: 21.4 CM2
ECHO LA AREA 4C: 24.4 CM2
ECHO LA DIAMETER INDEX: 1.96 CM/M2
ECHO LA DIAMETER: 3.9 CM
ECHO LA MAJOR AXIS: 6.8 CM
ECHO LA MINOR AXIS: 6.4 CM
ECHO LA TO AORTIC ROOT RATIO: 1.3
ECHO LA VOL BP: 67 ML (ref 18–58)
ECHO LA VOL MOD A2C: 59 ML (ref 18–58)
ECHO LA VOL MOD A4C: 72 ML (ref 18–58)
ECHO LA VOL/BSA BIPLANE: 34 ML/M2 (ref 16–34)
ECHO LA VOLUME INDEX MOD A2C: 30 ML/M2 (ref 16–34)
ECHO LA VOLUME INDEX MOD A4C: 36 ML/M2 (ref 16–34)
ECHO LV E' LATERAL VELOCITY: 8 CM/S
ECHO LV E' SEPTAL VELOCITY: 6 CM/S
ECHO LV FRACTIONAL SHORTENING: 34 % (ref 28–44)
ECHO LV INTERNAL DIMENSION DIASTOLE INDEX: 2.36 CM/M2
ECHO LV INTERNAL DIMENSION DIASTOLIC: 4.7 CM (ref 4.2–5.9)
ECHO LV INTERNAL DIMENSION SYSTOLIC INDEX: 1.56 CM/M2
ECHO LV INTERNAL DIMENSION SYSTOLIC: 3.1 CM
ECHO LV IVSD: 1.3 CM (ref 0.6–1)
ECHO LV MASS 2D: 237.9 G (ref 88–224)
ECHO LV MASS INDEX 2D: 119.5 G/M2 (ref 49–115)
ECHO LV POSTERIOR WALL DIASTOLIC: 1.3 CM (ref 0.6–1)
ECHO LV RELATIVE WALL THICKNESS RATIO: 0.55
ECHO LVOT AREA: 2.5 CM2
ECHO LVOT AV VTI INDEX: 0.74
ECHO LVOT DIAM: 1.8 CM
ECHO LVOT MEAN GRADIENT: 3 MMHG
ECHO LVOT PEAK GRADIENT: 5 MMHG
ECHO LVOT PEAK VELOCITY: 1.1 M/S
ECHO LVOT STROKE VOLUME INDEX: 32.7 ML/M2
ECHO LVOT SV: 65.1 ML
ECHO LVOT VTI: 25.6 CM
ECHO MV A VELOCITY: 0.95 M/S
ECHO MV AREA VTI: 1.6 CM2
ECHO MV E DECELERATION TIME (DT): 259 MS
ECHO MV E VELOCITY: 0.85 M/S
ECHO MV E/A RATIO: 0.89
ECHO MV E/E' LATERAL: 10.63
ECHO MV E/E' RATIO (AVERAGED): 12.4
ECHO MV E/E' SEPTAL: 14.17
ECHO MV LVOT VTI INDEX: 1.63
ECHO MV MAX VELOCITY: 1.2 M/S
ECHO MV MEAN GRADIENT: 2 MMHG
ECHO MV MEAN VELOCITY: 0.6 M/S
ECHO MV PEAK GRADIENT: 5 MMHG
ECHO MV VTI: 41.7 CM
ECHO RA AREA 4C: 15.4 CM2
ECHO RA END SYSTOLIC VOLUME APICAL 4 CHAMBER INDEX BSA: 17 ML/M2
ECHO RA VOLUME: 33 ML
ECHO RIGHT VENTRICULAR SYSTOLIC PRESSURE (RVSP): 13 MMHG
ECHO RV TAPSE: 2.5 CM (ref 1.7–?)
ECHO TV REGURGITANT MAX VELOCITY: 1.1 M/S
ECHO TV REGURGITANT PEAK GRADIENT: 5 MMHG
EKG ATRIAL RATE: 85 BPM
EKG P-R INTERVAL: 166 MS
EKG Q-T INTERVAL: 388 MS
EKG QRS DURATION: 80 MS
EKG QTC CALCULATION (BAZETT): 461 MS
EKG R AXIS: 39 DEGREES
EKG T AXIS: 6 DEGREES
EKG VENTRICULAR RATE: 85 BPM
EOSINOPHIL # BLD: 0.5 K/UL (ref 0–0.4)
EOSINOPHILS RELATIVE PERCENT: 6 % (ref 0–4)
ERYTHROCYTE [DISTWIDTH] IN BLOOD BY AUTOMATED COUNT: 14.3 % (ref 11.5–14.9)
GFR, ESTIMATED: >90 ML/MIN/1.73M2
GLUCOSE SERPL-MCNC: 96 MG/DL (ref 70–99)
HCT VFR BLD AUTO: 27.5 % (ref 41–53)
HGB BLD-MCNC: 9.1 G/DL (ref 13.5–17.5)
LYMPHOCYTES NFR BLD: 0.9 K/UL (ref 1–4.8)
LYMPHOCYTES RELATIVE PERCENT: 10 % (ref 24–44)
MAGNESIUM SERPL-MCNC: 1.5 MG/DL (ref 1.6–2.6)
MCH RBC QN AUTO: 31.4 PG (ref 26–34)
MCHC RBC AUTO-ENTMCNC: 33.3 G/DL (ref 31–37)
MCV RBC AUTO: 94.2 FL (ref 80–100)
MONOCYTES NFR BLD: 1 K/UL (ref 0.1–1.3)
MONOCYTES NFR BLD: 11 % (ref 1–7)
NEUTROPHILS NFR BLD: 73 % (ref 36–66)
NEUTS SEG NFR BLD: 6.4 K/UL (ref 1.3–9.1)
PLATELET # BLD AUTO: 141 K/UL (ref 150–450)
PMV BLD AUTO: 8.1 FL (ref 6–12)
POTASSIUM SERPL-SCNC: 4.1 MMOL/L (ref 3.7–5.3)
PROCALCITONIN SERPL-MCNC: 0.16 NG/ML
RBC # BLD AUTO: 2.92 M/UL (ref 4.5–5.9)
SODIUM SERPL-SCNC: 139 MMOL/L (ref 135–144)
WBC OTHER # BLD: 8.8 K/UL (ref 3.5–11)

## 2024-07-23 PROCEDURE — 2500000003 HC RX 250 WO HCPCS: Performed by: INTERNAL MEDICINE

## 2024-07-23 PROCEDURE — 1200000000 HC SEMI PRIVATE

## 2024-07-23 PROCEDURE — 80048 BASIC METABOLIC PNL TOTAL CA: CPT

## 2024-07-23 PROCEDURE — 2580000003 HC RX 258: Performed by: NURSE PRACTITIONER

## 2024-07-23 PROCEDURE — 99213 OFFICE O/P EST LOW 20 MIN: CPT

## 2024-07-23 PROCEDURE — 84145 PROCALCITONIN (PCT): CPT

## 2024-07-23 PROCEDURE — 6360000002 HC RX W HCPCS: Performed by: NURSE PRACTITIONER

## 2024-07-23 PROCEDURE — 93010 ELECTROCARDIOGRAM REPORT: CPT | Performed by: INTERNAL MEDICINE

## 2024-07-23 PROCEDURE — 2580000003 HC RX 258: Performed by: INTERNAL MEDICINE

## 2024-07-23 PROCEDURE — 85025 COMPLETE CBC W/AUTO DIFF WBC: CPT

## 2024-07-23 PROCEDURE — 36415 COLL VENOUS BLD VENIPUNCTURE: CPT

## 2024-07-23 PROCEDURE — 6370000000 HC RX 637 (ALT 250 FOR IP): Performed by: ORTHOPAEDIC SURGERY

## 2024-07-23 PROCEDURE — 6370000000 HC RX 637 (ALT 250 FOR IP): Performed by: INTERNAL MEDICINE

## 2024-07-23 PROCEDURE — 6370000000 HC RX 637 (ALT 250 FOR IP)

## 2024-07-23 PROCEDURE — 6370000000 HC RX 637 (ALT 250 FOR IP): Performed by: NURSE PRACTITIONER

## 2024-07-23 PROCEDURE — 99232 SBSQ HOSP IP/OBS MODERATE 35: CPT | Performed by: INTERNAL MEDICINE

## 2024-07-23 PROCEDURE — 83735 ASSAY OF MAGNESIUM: CPT

## 2024-07-23 RX ORDER — SODIUM CHLORIDE 9 MG/ML
INJECTION, SOLUTION INTRAVENOUS CONTINUOUS
Status: DISCONTINUED | OUTPATIENT
Start: 2024-07-23 | End: 2024-07-25

## 2024-07-23 RX ORDER — ERGOCALCIFEROL 1.25 MG/1
50000 CAPSULE ORAL WEEKLY
Status: DISCONTINUED | OUTPATIENT
Start: 2024-07-23 | End: 2024-07-29 | Stop reason: HOSPADM

## 2024-07-23 RX ORDER — MAGNESIUM SULFATE HEPTAHYDRATE 40 MG/ML
2000 INJECTION, SOLUTION INTRAVENOUS ONCE
Status: COMPLETED | OUTPATIENT
Start: 2024-07-23 | End: 2024-07-23

## 2024-07-23 RX ADMIN — METOPROLOL TARTRATE 12.5 MG: 25 TABLET, FILM COATED ORAL at 21:09

## 2024-07-23 RX ADMIN — Medication 400 MG: at 08:59

## 2024-07-23 RX ADMIN — SODIUM CHLORIDE: 9 INJECTION, SOLUTION INTRAVENOUS at 09:11

## 2024-07-23 RX ADMIN — FERROUS SULFATE TAB 325 MG (65 MG ELEMENTAL FE) 325 MG: 325 (65 FE) TAB at 09:00

## 2024-07-23 RX ADMIN — FERROUS SULFATE TAB 325 MG (65 MG ELEMENTAL FE) 325 MG: 325 (65 FE) TAB at 21:09

## 2024-07-23 RX ADMIN — PANTOPRAZOLE SODIUM 40 MG: 40 TABLET, DELAYED RELEASE ORAL at 21:09

## 2024-07-23 RX ADMIN — FOLIC ACID 1 MG: 1 TABLET ORAL at 09:00

## 2024-07-23 RX ADMIN — POTASSIUM CHLORIDE 10 MEQ: 1500 TABLET, EXTENDED RELEASE ORAL at 08:59

## 2024-07-23 RX ADMIN — METOPROLOL TARTRATE 12.5 MG: 25 TABLET, FILM COATED ORAL at 08:59

## 2024-07-23 RX ADMIN — Medication 400 MG: at 21:09

## 2024-07-23 RX ADMIN — HYDROCODONE BITARTRATE AND ACETAMINOPHEN 1 TABLET: 5; 325 TABLET ORAL at 23:33

## 2024-07-23 RX ADMIN — PANTOPRAZOLE SODIUM 40 MG: 40 TABLET, DELAYED RELEASE ORAL at 08:59

## 2024-07-23 RX ADMIN — MORPHINE SULFATE 2 MG: 2 INJECTION, SOLUTION INTRAMUSCULAR; INTRAVENOUS at 15:04

## 2024-07-23 RX ADMIN — POTASSIUM CHLORIDE 10 MEQ: 1500 TABLET, EXTENDED RELEASE ORAL at 21:09

## 2024-07-23 RX ADMIN — ERGOCALCIFEROL 50000 UNITS: 1.25 CAPSULE ORAL at 09:08

## 2024-07-23 RX ADMIN — HYDROCODONE BITARTRATE AND ACETAMINOPHEN 2 TABLET: 5; 325 TABLET ORAL at 19:16

## 2024-07-23 RX ADMIN — ONDANSETRON 4 MG: 2 INJECTION INTRAMUSCULAR; INTRAVENOUS at 03:17

## 2024-07-23 RX ADMIN — MAGNESIUM SULFATE HEPTAHYDRATE 2000 MG: 40 INJECTION, SOLUTION INTRAVENOUS at 08:54

## 2024-07-23 RX ADMIN — DULOXETINE HYDROCHLORIDE 30 MG: 30 CAPSULE, DELAYED RELEASE ORAL at 09:00

## 2024-07-23 RX ADMIN — HYDROCODONE BITARTRATE AND ACETAMINOPHEN 2 TABLET: 5; 325 TABLET ORAL at 01:37

## 2024-07-23 RX ADMIN — SODIUM CHLORIDE, PRESERVATIVE FREE 10 ML: 5 INJECTION INTRAVENOUS at 09:02

## 2024-07-23 RX ADMIN — HYDROCODONE BITARTRATE AND ACETAMINOPHEN 2 TABLET: 5; 325 TABLET ORAL at 08:57

## 2024-07-23 ASSESSMENT — PAIN DESCRIPTION - LOCATION
LOCATION: BACK
LOCATION: RIB CAGE;BACK
LOCATION: BACK
LOCATION: RIB CAGE
LOCATION: BACK

## 2024-07-23 ASSESSMENT — PAIN DESCRIPTION - DESCRIPTORS
DESCRIPTORS: DISCOMFORT
DESCRIPTORS: SHARP
DESCRIPTORS: ACHING;SORE;TENDER

## 2024-07-23 ASSESSMENT — PAIN SCALES - GENERAL
PAINLEVEL_OUTOF10: 10
PAINLEVEL_OUTOF10: 8
PAINLEVEL_OUTOF10: 8
PAINLEVEL_OUTOF10: 7
PAINLEVEL_OUTOF10: 7
PAINLEVEL_OUTOF10: 4
PAINLEVEL_OUTOF10: 6
PAINLEVEL_OUTOF10: 8

## 2024-07-23 ASSESSMENT — PAIN DESCRIPTION - ORIENTATION
ORIENTATION: RIGHT
ORIENTATION: RIGHT

## 2024-07-23 ASSESSMENT — PAIN DESCRIPTION - PAIN TYPE: TYPE: ACUTE PAIN

## 2024-07-23 ASSESSMENT — PAIN DESCRIPTION - FREQUENCY: FREQUENCY: CONTINUOUS

## 2024-07-23 ASSESSMENT — PAIN DESCRIPTION - ONSET: ONSET: ON-GOING

## 2024-07-23 ASSESSMENT — ENCOUNTER SYMPTOMS: SHORTNESS OF BREATH: 1

## 2024-07-23 ASSESSMENT — PAIN - FUNCTIONAL ASSESSMENT: PAIN_FUNCTIONAL_ASSESSMENT: PREVENTS OR INTERFERES SOME ACTIVE ACTIVITIES AND ADLS

## 2024-07-23 NOTE — PROGRESS NOTES
Notified Dr. Bajwa regarding patient's confusion worsening since this morning.  Patient was seeing people on the wall and ceiling and getting a little bit agitated.  Dr. Bajwa stated that patient was the same way yesterday and if he becomes more agitated to let him know

## 2024-07-23 NOTE — CONSULTS
line was carefully assessed and there was noted to be a small 1 cm area of dehiscence just above the umbilicus.  It appears that staple in this area has pulled through the skin. The area was gently probed and there did not appear to be any tunneling underneath the incision line, however upon removal of the cotton-tipped applicator, patient had a moderate amount of serous drainage come from the wound.  Wound was packed with 1/4 inch plain packing to allow it to drain.     Response to treatment:  Well tolerated by patient.       Plan:     Plan of Care:     Abdominal wound: Cleanse with saline, pat dry.  Gently pack with 1/4 inch plain packing, cover with dry dressing.  Change daily and as needed if loose or soiled.       [x] Turn and reposition every 2 hours while in bed.    [x] Float heels off of bed with pillows under calves.    [] Heel protective boots (heel medix boots) at all times while in bed.    [] Sacral foam dressing to sacrococcygeal area. Use skin barrier film prior to placement. Peel back dressing, inspect skin beneath, and re-secure every shift. Change every 3 days and as needed if loose or soiled. Discontinue sacral foam if repeatedly soiled by incontinence.    [] Apply zinc oxide cream twice daily and as needed after incontinent episodes.    [] Perform routine incontinence care with use of foam cleanser.    [x] Use single layer moisture wicking underpad.    [] Use comfort glide system and wedges to reposition patient.    [x] Keep the head of the bed below 30 degrees unless contraindicated.    [] Pressure reducing chair cushion while up to chair. Reposition every hour while in chair and limit chair time to 2 hour intervals.    [x] Encourage good nutritional intake and fluids. Consult dietician if needed.    Specialty Bed Required : Yes   [] Low Air Loss   [x] Pressure Redistribution  [] Fluid Immersion  [] Bariatric  [] Total Pressure Relief  [] Other:     Current Diet: ADULT DIET; Regular  Diet  NPO  Dietician consult:  No    Discharge Plan:  Placement for patient upon discharge: home with support   Patient appropriate for Outpatient Wound Care Center: Yes    Patient/Caregiver Teaching:  Level of patientunderstanding able to:     [x] Indicates understanding       [] Needs reinforcement  [] Unsuccessful      [x] Verbal Understanding  [] Demonstrated understanding       [] No evidence of learning  [] Refused teaching         [] N/A       Electronically signed by Madie Hernandez RN on  7/23/2024 at 5:30 PM

## 2024-07-23 NOTE — PROGRESS NOTES
07/23/24 1508   Encounter Summary   Encounter Overview/Reason Initial Encounter   Service Provided For Patient not available  (Staff with PT)

## 2024-07-23 NOTE — PROGRESS NOTES
[facility]   IN-PATIENT SERVICE   [facility]    HISTORY AND PHYSICAL EXAMINATION            Date:   7/23/2024  Patient name:  Frank Lisa  Date of admission:  7/20/2024  8:18 PM  MRN:   [id number]  Account:  [id number]  YOB: 1960  PCP:    Lopez Rosario PA  Room:   [facility]  Code Status:    Full Code    Chief Complaint:     Chief Complaint   Patient presents with    Shortness of Breath    Fall     Earlier in the day about 2pm       History Obtained From:     patient    History of Present Illness:     The patient is a 63 y.o.  Non- / non  male who presents with Shortness of Breath and Fall (Earlier in the day about 2pm)   and he is admitted to the hospital for the management of      Patient is 63-year-old male with past medical history of atrial fibrillation not on any anticoagulation due to history of liver cirrhosis and GI bleed, COPD, alcoholic liver cirrhosis s/p TIPS procedure hepatitis C electrolyte abnormality, history of esophageal cancer and remission, previous history of bowel perforation with extensive bowel surgery, and recently had ventral hernia repair and SBO presented to the ER after fall.  Patient states that he tripped and fell, patient not sure if he passed out or not,    Patient had pan CT, found to have T3 fracture, admitted under trauma, medicine consulted for medical management.  Breath    7/23/2024  Patient reports pain in the back to be stable  Is n.p.o. 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 Mc, orthopedic, every 3-4 months,

## 2024-07-23 NOTE — PROGRESS NOTES
Notified Dr. Abad regarding patient wanting to leave AMA and that patient is not safe to be leaving AMA.  Notified her that writer notified Dr. Bajwa that patient was having hallucinations (seeing people on the wall and ceiling).  Dr. Abad said she would come see the patient.

## 2024-07-23 NOTE — DISCHARGE INSTR - COC
to alcoholic cirrhosis (HCC) K70.31    Shortness of breath R06.02    Drop in hemoglobin R71.0    Esophageal polyp K22.81    Acute kidney failure, unspecified (HCC) N17.9    Muscle weakness (generalized) M62.81    Other abnormalities of gait and mobility R26.89    GI bleed K92.2    Goals of care, counseling/discussion Z71.89    ACP (advance care planning) Z71.89    Palliative care encounter Z51.5    S/P TIPS (transjugular intrahepatic portosystemic shunt) Z95.828    Acute metabolic encephalopathy G93.41    Lezama's esophagus with dysplasia K22.719    Mastoid disorder, bilateral H74.93    Acute encephalopathy G93.40    Chronic hepatitis C without hepatic coma (HCC) B18.2    Swelling of joint of upper arm, right M25.421    Anasarca R60.1    Lightheadedness R42    Alcohol withdrawal syndrome, with delirium (HCC) F10.931    Hemorrhage of rectum and anus K62.5    Electrolyte imbalance E87.8    Hyponatremia E87.1    Pain of upper abdomen R10.10    Moderate malnutrition (HCC) E44.0    Delirium due to another medical condition F05    ROBYN (acute kidney injury) (HCC) N17.9    Ileostomy in place (HCC) Z93.2    Peristomal dermatitis associated with moisture L30.8    Cellulitis L03.90    Coffee ground emesis K92.0    Substance abuse (HCC) F19.10    History of abdominal surgery Z98.890    Status post reversal of ileostomy Z98.890    Neck pain, chronic M54.2, G89.29    Peripheral polyneuropathy G62.9    Ulnar neuropathy of both upper extremities G56.23    Intractable nausea and vomiting R11.2    Right lower quadrant abdominal pain R10.31    Nausea R11.0    Elevated CEA R97.0    Abnormal abdominal CT scan R93.5    Generalized weakness R53.1    High carcinoembryonic antigen (CEA) R97.0    Wrist arthritis M19.039    Pain in joint involving right ankle and foot M25.571    Syncope R55    Other microscopic hematuria R31.29    Dizziness R42    Acute pancreatitis without infection or necrosis K85.90    Small bowel obstruction (HCC)  select all that are sent with patient):  None    RN SIGNATURE:  Electronically signed by Toya Rust RN on 7/29/24 at 3:47 PM EDT    CASE MANAGEMENT/SOCIAL WORK SECTION    Inpatient Status Date: 7/20/2024    Readmission Risk Assessment Score:  Readmission Risk              Risk of Unplanned Readmission:  68           Discharging to Facility/ Agency   [facility]   [address] [place] [address]   Phone:  [phone]   Fax:  [phone]     PLEASE REFER TO VNS WHEN DISCHARGING FROM [facility]  KALIE HERNANDEZ  P: [phone]  F: [phone]      Dialysis Facility (if applicable)   Name:  Address:  Dialysis Schedule:  Phone:  Fax:    / signature: Electronically signed by Leonila Chapa RN on 7/23/24 at 1:06 PM EDT    PHYSICIAN SECTION    Prognosis: Fair    Condition at Discharge: Stable    Rehab Potential (if transferring to Rehab): Fair    Recommended Labs or Other Treatments After Discharge: Follow up with ophthalmologist as soon as possible    Physician Certification: I certify the above information and transfer of Frank Lisa  is necessary for the continuing treatment of the diagnosis listed and that he requires Skilled Nursing Facility for greater 30 days.     Update Admission H&P: No change in H&P    PHYSICIAN SIGNATURE:  Electronically signed by Júnior Cueva MD on 7/25/24 at 6:24 PM EDT

## 2024-07-23 NOTE — CARE COORDINATION
ONGOING DISCHARGE PLAN:    Patient is alert and oriented x4.    Spoke with patient regarding discharge plan and patient confirms that plan is still home with [facility].    Pt going for T3 kypho today.    Will continue to follow for additional discharge needs.    If patient is discharged prior to next notation, then this note serves as note for discharge by case management.    Electronically signed by Leonila Chpaa RN on 7/23/2024 at 1:04 PM

## 2024-07-23 NOTE — PLAN OF CARE
Problem: Discharge Planning  Goal: Discharge to home or other facility with appropriate resources  Outcome: Progressing  Flowsheets (Taken 7/23/2024 1742)  Discharge to home or other facility with appropriate resources:   Identify barriers to discharge with patient and caregiver   Arrange for needed discharge resources and transportation as appropriate   Arrange for interpreters to assist at discharge as needed   Identify discharge learning needs (meds, wound care, etc)   Refer to discharge planning if patient needs post-hospital services based on physician order or complex needs related to functional status, cognitive ability or social support system     Problem: Pain  Goal: Verbalizes/displays adequate comfort level or baseline comfort level  Outcome: Progressing  Flowsheets (Taken 7/23/2024 1742)  Verbalizes/displays adequate comfort level or baseline comfort level:   Encourage patient to monitor pain and request assistance   Assess pain using appropriate pain scale   Administer analgesics based on type and severity of pain and evaluate response   Implement non-pharmacological measures as appropriate and evaluate response   Consider cultural and social influences on pain and pain management   Notify Licensed Independent Practitioner if interventions unsuccessful or patient reports new pain     Problem: Safety - Adult  Goal: Free from fall injury  Outcome: Progressing  Flowsheets (Taken 7/23/2024 1742)  Free From Fall Injury:   Instruct family/caregiver on patient safety   Based on caregiver fall risk screen, instruct family/caregiver to ask for assistance with transferring infant if caregiver noted to have fall risk factors     Problem: ABCDS Injury Assessment  Goal: Absence of physical injury  Outcome: Progressing  Flowsheets (Taken 7/23/2024 1742)  Absence of Physical Injury: Implement safety measures based on patient assessment     Problem: Skin/Tissue Integrity  Goal: Absence of new skin  breakdown  Description: 1.  Monitor for areas of redness and/or skin breakdown  2.  Assess vascular access sites hourly  3.  Every 4-6 hours minimum:  Change oxygen saturation probe site  4.  Every 4-6 hours:  If on nasal continuous positive airway pressure, respiratory therapy assess nares and determine need for appliance change or resting period.  Outcome: Progressing

## 2024-07-23 NOTE — PLAN OF CARE
Problem: Discharge Planning  Goal: Discharge to home or other facility with appropriate resources  Outcome: Progressing  Flowsheets (Taken 7/22/2024 2256)  Discharge to home or other facility with appropriate resources:   Identify barriers to discharge with patient and caregiver   Arrange for needed discharge resources and transportation as appropriate   Identify discharge learning needs (meds, wound care, etc)   Refer to discharge planning if patient needs post-hospital services based on physician order or complex needs related to functional status, cognitive ability or social support system     Problem: Pain  Goal: Verbalizes/displays adequate comfort level or baseline comfort level  7/22/2024 2256 by Rex Whittaker, RN  Outcome: Progressing  Flowsheets (Taken 7/22/2024 2256)  Verbalizes/displays adequate comfort level or baseline comfort level:   Encourage patient to monitor pain and request assistance   Assess pain using appropriate pain scale   Administer analgesics based on type and severity of pain and evaluate response   Implement non-pharmacological measures as appropriate and evaluate response   Consider cultural and social influences on pain and pain management   Notify Licensed Independent Practitioner if interventions unsuccessful or patient reports new pain  7/22/2024 1706 by Evie Whitman RN  Outcome: Progressing  Flowsheets (Taken 7/22/2024 1706)  Verbalizes/displays adequate comfort level or baseline comfort level:   Encourage patient to monitor pain and request assistance   Implement non-pharmacological measures as appropriate and evaluate response   Notify Licensed Independent Practitioner if interventions unsuccessful or patient reports new pain  Note: Patient able to express need of pain medication in an appropriate manner.     Problem: Safety - Adult  Goal: Free from fall injury  7/22/2024 2256 by Rex Whittaker, RN  Outcome: Progressing  Flowsheets (Taken 7/22/2024 2256)  Free From Fall

## 2024-07-23 NOTE — ANESTHESIA PRE PROCEDURE
Department of Anesthesiology  Preprocedure Note       Name:  Frank Lisa   Age:  63 y.o.  :  1960                                          MRN:  [id number]         Date:  2024      Surgeon: Surgeon(s):  Júnior Cueva MD    Procedure: Procedure(s):  KYPHOPLASTY T3    Medications prior to admission:   Prior to Admission medications    Medication Sig Start Date End Date Taking? Authorizing Provider   potassium chloride (KLOR-CON M) 20 MEQ extended release tablet Take 1 tablet by mouth daily 24   Stefano Peters DO   ondansetron (ZOFRAN-ODT) 4 MG disintegrating tablet Take 1 tablet by mouth every 8 hours as needed for Nausea or Vomiting 7/15/24   Jaziel Cerda MD   magnesium oxide (MAG-OX) 400 MG tablet Take 1 tablet by mouth daily 24   Lopez Rosario PA   pantoprazole (PROTONIX) 40 MG tablet Take 1 tablet by mouth in the morning and at bedtime 24   Jessika Abad MD   sucralfate (CARAFATE) 1 GM tablet Take 1 tablet by mouth 4 times daily (before meals and nightly) 24   Jessika Abad MD   Elastic Bandages & Supports (MEDICAL COMPRESSION STOCKINGS) MISC 2 each by Does not apply route daily Right and left knee high compression stockings.  30-40 mmHg.  To be worn continuously throughout the day. 24   Lopez Rosario PA   ondansetron (ZOFRAN) 4 MG tablet Take 1 tablet by mouth every 8 hours as needed for Nausea or Vomiting    Dania Peoples MD   lactulose (CHRONULAC) 10 GM/15ML solution Take 45 mLs by mouth 3 times daily 24   Jessika Abad MD   metoprolol tartrate (LOPRESSOR) 25 MG tablet Take 0.5 tablets by mouth 2 times daily 24   Jessika Abad MD   ferrous sulfate (IRON 325) 325 (65 Fe) MG tablet Take 1 tablet by mouth in the morning and at bedtime    Dania Peoples MD   Handicap Placard MISC by Does not apply route Exp: 26   Lopez Rosario PA   folic acid (FOLVITE) 1 MG tablet Take 1 tablet by mouth daily 5/3/24

## 2024-07-24 ENCOUNTER — APPOINTMENT (OUTPATIENT)
Dept: GENERAL RADIOLOGY | Age: 64
DRG: 479 | End: 2024-07-24
Attending: ORTHOPAEDIC SURGERY
Payer: COMMERCIAL

## 2024-07-24 PROBLEM — M80.00XG AGE-RELATED OSTEOPOROSIS WITH CURRENT PATHOLOGICAL FRACTURE WITH DELAYED HEALING: Status: ACTIVE | Noted: 2024-07-24

## 2024-07-24 PROBLEM — M54.6 ACUTE MIDLINE THORACIC BACK PAIN: Status: ACTIVE | Noted: 2024-07-24

## 2024-07-24 LAB
ANION GAP SERPL CALCULATED.3IONS-SCNC: 9 MMOL/L (ref 9–17)
BASOPHILS # BLD: 0 K/UL (ref 0–0.2)
BASOPHILS NFR BLD: 0 % (ref 0–2)
BUN SERPL-MCNC: 6 MG/DL (ref 8–23)
CALCIUM SERPL-MCNC: 8.2 MG/DL (ref 8.6–10.4)
CHLORIDE SERPL-SCNC: 106 MMOL/L (ref 98–107)
CO2 SERPL-SCNC: 25 MMOL/L (ref 20–31)
CREAT SERPL-MCNC: 0.9 MG/DL (ref 0.7–1.2)
EOSINOPHIL # BLD: 0.5 K/UL (ref 0–0.4)
EOSINOPHILS RELATIVE PERCENT: 5 % (ref 0–4)
ERYTHROCYTE [DISTWIDTH] IN BLOOD BY AUTOMATED COUNT: 14.2 % (ref 11.5–14.9)
GFR, ESTIMATED: >90 ML/MIN/1.73M2
GLUCOSE BLD-MCNC: 80 MG/DL (ref 75–110)
GLUCOSE SERPL-MCNC: 103 MG/DL (ref 70–99)
HCT VFR BLD AUTO: 27.1 % (ref 41–53)
HGB BLD-MCNC: 9.1 G/DL (ref 13.5–17.5)
LYMPHOCYTES NFR BLD: 0.9 K/UL (ref 1–4.8)
LYMPHOCYTES RELATIVE PERCENT: 9 % (ref 24–44)
MAGNESIUM SERPL-MCNC: 1.6 MG/DL (ref 1.6–2.6)
MCH RBC QN AUTO: 31.3 PG (ref 26–34)
MCHC RBC AUTO-ENTMCNC: 33.4 G/DL (ref 31–37)
MCV RBC AUTO: 93.8 FL (ref 80–100)
MONOCYTES NFR BLD: 1.1 K/UL (ref 0.1–1.3)
MONOCYTES NFR BLD: 11 % (ref 1–7)
NEUTROPHILS NFR BLD: 75 % (ref 36–66)
NEUTS SEG NFR BLD: 7.6 K/UL (ref 1.3–9.1)
PLATELET # BLD AUTO: 147 K/UL (ref 150–450)
PMV BLD AUTO: 7.7 FL (ref 6–12)
POTASSIUM SERPL-SCNC: 4.7 MMOL/L (ref 3.7–5.3)
RBC # BLD AUTO: 2.89 M/UL (ref 4.5–5.9)
SODIUM SERPL-SCNC: 140 MMOL/L (ref 135–144)
WBC OTHER # BLD: 10 K/UL (ref 3.5–11)

## 2024-07-24 PROCEDURE — 7100000001 HC PACU RECOVERY - ADDTL 15 MIN: Performed by: ORTHOPAEDIC SURGERY

## 2024-07-24 PROCEDURE — 3700000000 HC ANESTHESIA ATTENDED CARE: Performed by: ORTHOPAEDIC SURGERY

## 2024-07-24 PROCEDURE — 2500000003 HC RX 250 WO HCPCS

## 2024-07-24 PROCEDURE — C1894 INTRO/SHEATH, NON-LASER: HCPCS | Performed by: ORTHOPAEDIC SURGERY

## 2024-07-24 PROCEDURE — 3600000012 HC SURGERY LEVEL 2 ADDTL 15MIN: Performed by: ORTHOPAEDIC SURGERY

## 2024-07-24 PROCEDURE — 6370000000 HC RX 637 (ALT 250 FOR IP): Performed by: ORTHOPAEDIC SURGERY

## 2024-07-24 PROCEDURE — C1713 ANCHOR/SCREW BN/BN,TIS/BN: HCPCS | Performed by: ORTHOPAEDIC SURGERY

## 2024-07-24 PROCEDURE — 99232 SBSQ HOSP IP/OBS MODERATE 35: CPT | Performed by: INTERNAL MEDICINE

## 2024-07-24 PROCEDURE — 88307 TISSUE EXAM BY PATHOLOGIST: CPT

## 2024-07-24 PROCEDURE — 82947 ASSAY GLUCOSE BLOOD QUANT: CPT

## 2024-07-24 PROCEDURE — 80048 BASIC METABOLIC PNL TOTAL CA: CPT

## 2024-07-24 PROCEDURE — 2500000003 HC RX 250 WO HCPCS: Performed by: ORTHOPAEDIC SURGERY

## 2024-07-24 PROCEDURE — 2580000003 HC RX 258: Performed by: INTERNAL MEDICINE

## 2024-07-24 PROCEDURE — 2709999900 HC NON-CHARGEABLE SUPPLY: Performed by: ORTHOPAEDIC SURGERY

## 2024-07-24 PROCEDURE — 0PS43ZZ REPOSITION THORACIC VERTEBRA, PERCUTANEOUS APPROACH: ICD-10-PCS | Performed by: ORTHOPAEDIC SURGERY

## 2024-07-24 PROCEDURE — 6360000002 HC RX W HCPCS

## 2024-07-24 PROCEDURE — 6360000002 HC RX W HCPCS: Performed by: ANESTHESIOLOGY

## 2024-07-24 PROCEDURE — 85025 COMPLETE CBC W/AUTO DIFF WBC: CPT

## 2024-07-24 PROCEDURE — 0PU43JZ SUPPLEMENT THORACIC VERTEBRA WITH SYNTHETIC SUBSTITUTE, PERCUTANEOUS APPROACH: ICD-10-PCS | Performed by: ORTHOPAEDIC SURGERY

## 2024-07-24 PROCEDURE — 2720000010 HC SURG SUPPLY STERILE: Performed by: ORTHOPAEDIC SURGERY

## 2024-07-24 PROCEDURE — 1200000000 HC SEMI PRIVATE

## 2024-07-24 PROCEDURE — 0PB43ZX EXCISION OF THORACIC VERTEBRA, PERCUTANEOUS APPROACH, DIAGNOSTIC: ICD-10-PCS | Performed by: ORTHOPAEDIC SURGERY

## 2024-07-24 PROCEDURE — 3700000001 HC ADD 15 MINUTES (ANESTHESIA): Performed by: ORTHOPAEDIC SURGERY

## 2024-07-24 PROCEDURE — 2580000003 HC RX 258: Performed by: ORTHOPAEDIC SURGERY

## 2024-07-24 PROCEDURE — 88311 DECALCIFY TISSUE: CPT

## 2024-07-24 PROCEDURE — 88341 IMHCHEM/IMCYTCHM EA ADD ANTB: CPT

## 2024-07-24 PROCEDURE — 83735 ASSAY OF MAGNESIUM: CPT

## 2024-07-24 PROCEDURE — 88342 IMHCHEM/IMCYTCHM 1ST ANTB: CPT

## 2024-07-24 PROCEDURE — 36415 COLL VENOUS BLD VENIPUNCTURE: CPT

## 2024-07-24 PROCEDURE — 6360000002 HC RX W HCPCS: Performed by: ORTHOPAEDIC SURGERY

## 2024-07-24 PROCEDURE — 7100000000 HC PACU RECOVERY - FIRST 15 MIN: Performed by: ORTHOPAEDIC SURGERY

## 2024-07-24 PROCEDURE — 3600000002 HC SURGERY LEVEL 2 BASE: Performed by: ORTHOPAEDIC SURGERY

## 2024-07-24 DEVICE — CEMENT C01A KYPHX HV-R BONE CEMENT EN
Type: IMPLANTABLE DEVICE | Site: SPINE THORACIC | Status: FUNCTIONAL
Brand: KYPHON® HV-R® BONE CEMENT

## 2024-07-24 RX ORDER — ROCURONIUM BROMIDE 10 MG/ML
INJECTION, SOLUTION INTRAVENOUS PRN
Status: DISCONTINUED | OUTPATIENT
Start: 2024-07-24 | End: 2024-07-24 | Stop reason: SDUPTHER

## 2024-07-24 RX ORDER — NALOXONE HYDROCHLORIDE 0.4 MG/ML
INJECTION, SOLUTION INTRAMUSCULAR; INTRAVENOUS; SUBCUTANEOUS PRN
Status: DISCONTINUED | OUTPATIENT
Start: 2024-07-24 | End: 2024-07-24 | Stop reason: HOSPADM

## 2024-07-24 RX ORDER — DIPHENHYDRAMINE HYDROCHLORIDE 50 MG/ML
12.5 INJECTION INTRAMUSCULAR; INTRAVENOUS
Status: DISCONTINUED | OUTPATIENT
Start: 2024-07-24 | End: 2024-07-24 | Stop reason: HOSPADM

## 2024-07-24 RX ORDER — ONDANSETRON 2 MG/ML
INJECTION INTRAMUSCULAR; INTRAVENOUS PRN
Status: DISCONTINUED | OUTPATIENT
Start: 2024-07-24 | End: 2024-07-24 | Stop reason: SDUPTHER

## 2024-07-24 RX ORDER — MIDAZOLAM HYDROCHLORIDE 1 MG/ML
INJECTION INTRAMUSCULAR; INTRAVENOUS PRN
Status: DISCONTINUED | OUTPATIENT
Start: 2024-07-24 | End: 2024-07-24 | Stop reason: SDUPTHER

## 2024-07-24 RX ORDER — SODIUM CHLORIDE 0.9 % (FLUSH) 0.9 %
5-40 SYRINGE (ML) INJECTION EVERY 12 HOURS SCHEDULED
Status: DISCONTINUED | OUTPATIENT
Start: 2024-07-24 | End: 2024-07-24 | Stop reason: HOSPADM

## 2024-07-24 RX ORDER — METOCLOPRAMIDE HYDROCHLORIDE 5 MG/ML
10 INJECTION INTRAMUSCULAR; INTRAVENOUS
Status: DISCONTINUED | OUTPATIENT
Start: 2024-07-24 | End: 2024-07-24 | Stop reason: HOSPADM

## 2024-07-24 RX ORDER — SODIUM CHLORIDE 9 MG/ML
INJECTION, SOLUTION INTRAVENOUS PRN
Status: DISCONTINUED | OUTPATIENT
Start: 2024-07-24 | End: 2024-07-24 | Stop reason: HOSPADM

## 2024-07-24 RX ORDER — BUPIVACAINE HYDROCHLORIDE AND EPINEPHRINE 2.5; 5 MG/ML; UG/ML
INJECTION, SOLUTION EPIDURAL; INFILTRATION; INTRACAUDAL; PERINEURAL PRN
Status: DISCONTINUED | OUTPATIENT
Start: 2024-07-24 | End: 2024-07-24 | Stop reason: ALTCHOICE

## 2024-07-24 RX ORDER — LIDOCAINE HYDROCHLORIDE 20 MG/ML
INJECTION, SOLUTION EPIDURAL; INFILTRATION; INTRACAUDAL; PERINEURAL PRN
Status: DISCONTINUED | OUTPATIENT
Start: 2024-07-24 | End: 2024-07-24 | Stop reason: SDUPTHER

## 2024-07-24 RX ORDER — DEXAMETHASONE SODIUM PHOSPHATE 4 MG/ML
INJECTION, SOLUTION INTRA-ARTICULAR; INTRALESIONAL; INTRAMUSCULAR; INTRAVENOUS; SOFT TISSUE PRN
Status: DISCONTINUED | OUTPATIENT
Start: 2024-07-24 | End: 2024-07-24 | Stop reason: SDUPTHER

## 2024-07-24 RX ORDER — SODIUM CHLORIDE 0.9 % (FLUSH) 0.9 %
5-40 SYRINGE (ML) INJECTION PRN
Status: DISCONTINUED | OUTPATIENT
Start: 2024-07-24 | End: 2024-07-24 | Stop reason: HOSPADM

## 2024-07-24 RX ORDER — M-VIT,TX,IRON,MINS/CALC/FOLIC 27MG-0.4MG
1 TABLET ORAL DAILY
Status: DISCONTINUED | OUTPATIENT
Start: 2024-07-24 | End: 2024-07-29 | Stop reason: HOSPADM

## 2024-07-24 RX ORDER — ONDANSETRON 2 MG/ML
4 INJECTION INTRAMUSCULAR; INTRAVENOUS
Status: DISCONTINUED | OUTPATIENT
Start: 2024-07-24 | End: 2024-07-24 | Stop reason: HOSPADM

## 2024-07-24 RX ORDER — PROPOFOL 10 MG/ML
INJECTION, EMULSION INTRAVENOUS PRN
Status: DISCONTINUED | OUTPATIENT
Start: 2024-07-24 | End: 2024-07-24 | Stop reason: SDUPTHER

## 2024-07-24 RX ORDER — FENTANYL CITRATE 50 UG/ML
INJECTION, SOLUTION INTRAMUSCULAR; INTRAVENOUS PRN
Status: DISCONTINUED | OUTPATIENT
Start: 2024-07-24 | End: 2024-07-24 | Stop reason: SDUPTHER

## 2024-07-24 RX ORDER — FENTANYL CITRATE 0.05 MG/ML
25 INJECTION, SOLUTION INTRAMUSCULAR; INTRAVENOUS EVERY 5 MIN PRN
Status: DISCONTINUED | OUTPATIENT
Start: 2024-07-24 | End: 2024-07-24 | Stop reason: HOSPADM

## 2024-07-24 RX ADMIN — HYDROCODONE BITARTRATE AND ACETAMINOPHEN 2 TABLET: 5; 325 TABLET ORAL at 18:04

## 2024-07-24 RX ADMIN — DULOXETINE HYDROCHLORIDE 30 MG: 30 CAPSULE, DELAYED RELEASE ORAL at 17:43

## 2024-07-24 RX ADMIN — METOPROLOL TARTRATE 12.5 MG: 25 TABLET, FILM COATED ORAL at 17:42

## 2024-07-24 RX ADMIN — FERROUS SULFATE TAB 325 MG (65 MG ELEMENTAL FE) 325 MG: 325 (65 FE) TAB at 17:42

## 2024-07-24 RX ADMIN — SODIUM CHLORIDE: 9 INJECTION, SOLUTION INTRAVENOUS at 11:20

## 2024-07-24 RX ADMIN — FOLIC ACID 1 MG: 1 TABLET ORAL at 17:43

## 2024-07-24 RX ADMIN — HYDROMORPHONE HYDROCHLORIDE 0.5 MG: 1 INJECTION, SOLUTION INTRAMUSCULAR; INTRAVENOUS; SUBCUTANEOUS at 14:29

## 2024-07-24 RX ADMIN — POTASSIUM CHLORIDE 10 MEQ: 1500 TABLET, EXTENDED RELEASE ORAL at 17:42

## 2024-07-24 RX ADMIN — MIDAZOLAM 2 MG: 1 INJECTION INTRAMUSCULAR; INTRAVENOUS at 13:04

## 2024-07-24 RX ADMIN — DEXAMETHASONE SODIUM PHOSPHATE 8 MG: 4 INJECTION INTRA-ARTICULAR; INTRALESIONAL; INTRAMUSCULAR; INTRAVENOUS; SOFT TISSUE at 13:13

## 2024-07-24 RX ADMIN — SODIUM CHLORIDE, PRESERVATIVE FREE 10 ML: 5 INJECTION INTRAVENOUS at 17:45

## 2024-07-24 RX ADMIN — PANTOPRAZOLE SODIUM 40 MG: 40 TABLET, DELAYED RELEASE ORAL at 19:54

## 2024-07-24 RX ADMIN — PANTOPRAZOLE SODIUM 40 MG: 40 TABLET, DELAYED RELEASE ORAL at 17:43

## 2024-07-24 RX ADMIN — PROPOFOL 200 MG: 10 INJECTION, EMULSION INTRAVENOUS at 13:06

## 2024-07-24 RX ADMIN — SODIUM CHLORIDE: 9 INJECTION, SOLUTION INTRAVENOUS at 17:44

## 2024-07-24 RX ADMIN — ONDANSETRON 4 MG: 2 INJECTION INTRAMUSCULAR; INTRAVENOUS at 13:13

## 2024-07-24 RX ADMIN — ROCURONIUM BROMIDE 10 MG: 10 INJECTION, SOLUTION INTRAVENOUS at 13:32

## 2024-07-24 RX ADMIN — METOPROLOL TARTRATE 12.5 MG: 25 TABLET, FILM COATED ORAL at 19:53

## 2024-07-24 RX ADMIN — PHENYLEPHRINE HYDROCHLORIDE 50 MCG: 10 INJECTION INTRAVENOUS at 13:22

## 2024-07-24 RX ADMIN — FENTANYL CITRATE 50 MCG: 50 INJECTION, SOLUTION INTRAMUSCULAR; INTRAVENOUS at 13:32

## 2024-07-24 RX ADMIN — Medication 2000 MG: at 13:13

## 2024-07-24 RX ADMIN — LIDOCAINE HYDROCHLORIDE 100 MG: 20 INJECTION, SOLUTION EPIDURAL; INFILTRATION; INTRACAUDAL; PERINEURAL at 13:06

## 2024-07-24 RX ADMIN — Medication 400 MG: at 19:54

## 2024-07-24 RX ADMIN — Medication 1 TABLET: at 17:41

## 2024-07-24 RX ADMIN — ROCURONIUM BROMIDE 50 MG: 10 INJECTION, SOLUTION INTRAVENOUS at 13:06

## 2024-07-24 RX ADMIN — Medication 400 MG: at 17:42

## 2024-07-24 RX ADMIN — FERROUS SULFATE TAB 325 MG (65 MG ELEMENTAL FE) 325 MG: 325 (65 FE) TAB at 19:53

## 2024-07-24 RX ADMIN — LACTULOSE 20 G: 20 SOLUTION ORAL at 17:41

## 2024-07-24 RX ADMIN — POTASSIUM CHLORIDE 10 MEQ: 1500 TABLET, EXTENDED RELEASE ORAL at 19:54

## 2024-07-24 RX ADMIN — SUGAMMADEX 200 MG: 100 INJECTION, SOLUTION INTRAVENOUS at 13:50

## 2024-07-24 RX ADMIN — FENTANYL CITRATE 50 MCG: 50 INJECTION, SOLUTION INTRAMUSCULAR; INTRAVENOUS at 13:06

## 2024-07-24 ASSESSMENT — PAIN SCALES - GENERAL
PAINLEVEL_OUTOF10: 0
PAINLEVEL_OUTOF10: 0
PAINLEVEL_OUTOF10: 8
PAINLEVEL_OUTOF10: 7

## 2024-07-24 ASSESSMENT — PAIN DESCRIPTION - LOCATION
LOCATION: BACK;ABDOMEN;HAND
LOCATION: BACK

## 2024-07-24 ASSESSMENT — PAIN DESCRIPTION - PAIN TYPE: TYPE: SURGICAL PAIN

## 2024-07-24 ASSESSMENT — PAIN - FUNCTIONAL ASSESSMENT: PAIN_FUNCTIONAL_ASSESSMENT: 0-10

## 2024-07-24 ASSESSMENT — PAIN DESCRIPTION - DESCRIPTORS
DESCRIPTORS: ACHING;SHARP
DESCRIPTORS: PATIENT UNABLE TO DESCRIBE

## 2024-07-24 NOTE — BRIEF OP NOTE
Brief Postoperative Note      Patient: Frank Lisa  YOB: 1960  MRN: [id number]    Date of Procedure: 7/24/2024    Pre-Op Diagnosis Codes:     * Compression fracture of T3 vertebra, initial encounter ([facility]) [S22.030A]    Post-Op Diagnosis: Same       Procedure(s):  KYPHOPLASTY T3    Surgeon(s):  Júnior Cueva MD Hill, Taylor, PA    Assistant:  * No surgical staff found *    Anesthesia: General    Estimated Blood Loss (mL): Minimal    Complications: None    Specimens:   ID Type Source Tests Collected by Time Destination   A : T3 BONE BIOPSY Bone Back SURGICAL PATHOLOGY Júnior Cueva MD 7/24/2024 1341        Implants:  Implant Name Type Inv. Item Serial No.  Lot No. LRB No. Used Action   CEMENT BNE HI VISC RADIOPAQUE KYPHON HV-R - [id number]  CEMENT BNE HI VISC RADIOPAQUE KYPHON HV-R  [employer] SOFAMOR DANEK-WD [id number] N/A 1 Implanted         Drains:   [REMOVED] Closed/Suction Drain Right RUQ Bulb (Removed)   Site Description Clean, dry & intact 07/15/24 0603   Dressing Status Clean, dry & intact 07/15/24 0603   Drainage Appearance Serosanguinous 07/15/24 0603   Drain Status Other (comment) 07/15/24 0603   Output (ml) 8 ml 07/15/24 0603       [REMOVED] Urinary Catheter 07/12/24 Neal (Removed)       Findings:  Infection Present At Time Of Surgery (PATOS) (choose all levels that have infection present):  No infection present  Other Findings: see dictastion    Electronically signed by Júnior Cueva MD on 7/24/2024 at 2:15 PM

## 2024-07-24 NOTE — ANESTHESIA POSTPROCEDURE EVALUATION
Department of Anesthesiology  Postprocedure Note    Patient: Frank Lisa  MRN: [id number]  YOB: 1960  Date of evaluation: 7/24/2024    Procedure Summary       Date: 07/24/24 Room / Location: [address]    Anesthesia Start: 1302 Anesthesia Stop: 1404    Procedure: KYPHOPLASTY T3 (Back) Diagnosis:       Compression fracture of T3 vertebra, initial encounter ([facility])      (Compression fracture of T3 vertebra, initial encounter ([facility]) [S22.030A])    Surgeons: Júnior Cueva MD Responsible Provider: Kyle Sanders MD    Anesthesia Type: General ASA Status: 3            Anesthesia Type: General    Yen Phase I: Yen Score: 9    Yen Phase II:      Anesthesia Post Evaluation    Comments: POST- ANESTHESIA EVALUATION       Pt Name: Frank Lisa  MRN: [id number]  YOB: 1960  Date of evaluation: 7/24/2024  Time:  3:06 PM      BP (!) 146/82   Pulse 72   Temp 97.9 °F (36.6 °C)   Resp 18   Ht 1.753 m (5' 9\")   Wt 82.4 kg (181 lb 10.5 oz)   SpO2 93%   BMI 26.83 kg/m²      Consciousness Level  Awake  Cardiopulmonary Status  Stable  Pain Adequately Treated YES  Nausea / Vomiting  NO  Adequate Hydration  YES  Anesthesia Related Complications NONE      Electronically signed by Genevieve Cahng MD on 7/24/2024 at 3:06 PM      No notable events documented.

## 2024-07-24 NOTE — CARE COORDINATION
ONGOING DISCHARGE PLAN:    Patient is alert and oriented x4.    Spoke with patient regarding discharge plan and patient confirms that plan is requesting [facility] at discharge. Referral sent.    T3 kyphoplasty today    Will need PT/OT    Will continue to follow for additional discharge needs.    If patient is discharged prior to next notation, then this note serves as note for discharge by case management.    Electronically signed by Dianne Jarquin RN on 7/24/2024 at 10:53 AM

## 2024-07-24 NOTE — PLAN OF CARE
Problem: Discharge Planning  Goal: Discharge to home or other facility with appropriate resources  7/24/2024 0429 by Argelia Rogers, RN  Outcome: Progressing  Flowsheets (Taken 7/24/2024 0429)  Discharge to home or other facility with appropriate resources:   Identify barriers to discharge with patient and caregiver   Arrange for needed discharge resources and transportation as appropriate   Identify discharge learning needs (meds, wound care, etc)   Refer to discharge planning if patient needs post-hospital services based on physician order or complex needs related to functional status, cognitive ability or social support system  Note: Inform pt. Of discharge teaching and planned. Instructed pt. To inform me if further teaching need to be done.      Problem: Pain  Goal: Verbalizes/displays adequate comfort level or baseline comfort level  7/24/2024 0429 by Argelia Rogers, RN  Outcome: Progressing  Flowsheets (Taken 7/24/2024 0429)  Verbalizes/displays adequate comfort level or baseline comfort level:   Encourage patient to monitor pain and request assistance   Assess pain using appropriate pain scale   Administer analgesics based on type and severity of pain and evaluate response   Implement non-pharmacological measures as appropriate and evaluate response   Consider cultural and social influences on pain and pain management   Notify Licensed Independent Practitioner if interventions unsuccessful or patient reports new pain  Note: PT. Received pain meds in timely manner. Teach pt. Non-pharm. Methods to handle pain.      Problem: Safety - Adult  Goal: Free from fall injury  7/24/2024 0429 by Argelia Rogers, RN  Outcome: Progressing  Flowsheets (Taken 7/24/2024 0429)  Free From Fall Injury:   Instruct family/caregiver on patient safety   Based on caregiver fall risk screen, instruct family/caregiver to ask for assistance with transferring infant if caregiver noted to have fall risk factors  Note: Made sure

## 2024-07-24 NOTE — PROGRESS NOTES
[facility]   IN-PATIENT SERVICE   [facility]    HISTORY AND PHYSICAL EXAMINATION            Date:   7/24/2024  Patient name:  Frank Lisa  Date of admission:  7/20/2024  8:18 PM  MRN:   [id number]  Account:  [id number]  YOB: 1960  PCP:    Lopez Rosario PA  Room:   [id number]  Code Status:    Full Code    Chief Complaint:     Chief Complaint   Patient presents with    Shortness of Breath    Fall     Earlier in the day about 2pm       History Obtained From:     patient    History of Present Illness:     The patient is a 63 y.o.  Non- / non  male who presents with Shortness of Breath and Fall (Earlier in the day about 2pm)   and he is admitted to the hospital for the management of      Patient is 63-year-old male with past medical history of atrial fibrillation not on any anticoagulation due to history of liver cirrhosis and GI bleed, COPD, alcoholic liver cirrhosis s/p TIPS procedure hepatitis C electrolyte abnormality, history of esophageal cancer and remission, previous history of bowel perforation with extensive bowel surgery, and recently had ventral hernia repair and SBO presented to the ER after fall.  Patient states that he tripped and fell, patient not sure if he passed out or not,    Patient had pan CT, found to have T3 fracture, admitted under trauma, medicine consulted for medical management.  Breath    7/23/2024  Patient reports pain in the back to be stable  Is n.p.o. 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 Mc, orthopedic, every 3-4 months,  BUN 6 (L) 8 - 23 mg/dL    Creatinine 0.9 0.7 - 1.2 mg/dL    Est, Glom Filt Rate >90 >60 mL/min/1.73m2    Calcium 8.2 (L) 8.6 - 10.4 mg/dL   CBC with Auto Differential    Collection Time: 07/24/24  6:29 AM   Result Value Ref Range    WBC 10.0 3.5 - 11.0 k/uL    RBC 2.89 (L) 4.5 - 5.9 m/uL    Hemoglobin 9.1 (L) 13.5 - 17.5 g/dL    Hematocrit 27.1 (L) 41 - 53 %    MCV 93.8 80 - 100 fL    MCH 31.3 26 - 34 pg    MCHC 33.4 31 - 37 g/dL    RDW 14.2 11.5 - 14.9 %    Platelets 147 (L) 150 - 450 k/uL    MPV 7.7 6.0 - 12.0 fL    Neutrophils % 75 (H) 36 - 66 %    Lymphocytes % 9 (L) 24 - 44 %    Monocytes % 11 (H) 1 - 7 %    Eosinophils % 5 (H) 0 - 4 %    Basophils % 0 0 - 2 %    Neutrophils Absolute 7.60 1.3 - 9.1 k/uL    Lymphocytes Absolute 0.90 (L) 1.0 - 4.8 k/uL    Monocytes Absolute 1.10 0.1 - 1.3 k/uL    Eosinophils Absolute 0.50 (H) 0.0 - 0.4 k/uL    Basophils Absolute 0.00 0.0 - 0.2 k/uL         Imaging/Diagnostics:    Echo (TTE) complete (PRN contrast/bubble/strain/3D)  The left ventricle appears normal in size.  Mild to moderately increased   LV wall thickness  No obvious wall motion abnormality noted  Normal LV systolic function, ejection fraction 60 to 65%  The right ventricle appears normal in size and function  The left atrium appears mildly dilated  The aortic valve structure is normal no stenosis no regurgitation  The mitral valve structure is normal trace regurgitation no stenosis  Tricuspid valve structure is normal trace regurgitation no stenosis  Pulmonary valve was not well-visualized trace regurgitation no stenosis  Normal aortic root dimension  The IVC was not assessed  No significant pericardial effusion seen       Assessment :      Primary Problem  Closed wedge compression fracture of T3 vertebra with routine healing    Active Hospital Problems    Diagnosis Date Noted    Closed wedge compression fracture of T3 vertebra with routine healing [S22.030D] 07/20/2024       Plan:     Patient status Admit as

## 2024-07-24 NOTE — CONSULTS
General Surgery  Consultation        PATIENT NAME: Frank Lisa   YOB: 1960    ADMISSION DATE: 7/20/2024  8:18 PM     Consulting Physician: Dr Waddell     TODAY'S DATE: 7/24/2024    Reason for consult: Wound seroma    Chief complaint: T3 fracture    HISTORY OF PRESENT ILLNESS:  The patient is a cirrhotic 63 y.o. male  who presents with recent fall and newly diagnosed T3 fracture.  Patient is status post recent incisional herniorrhaphy with mesh due to obstruction and has had some serous drainage from his wound.  Patient denies any fever chills jaundice or acholic stools.  Patient's recent stapled midline incision is open with some mild serous drainage.    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 Mc, orthopedic, every 3-4 months, gets steroid injection    Lezama esophagus     Bleeding gastric varices 12/29/2022    Bowel perforation (HCC) 03/25/2023    BPH (benign prostatic hypertrophy)     Cholelithiasis     Cirrhosis (HCC)     COVID-19 12/2020    pt reports he had a positive test while at [facility] in 2020, was asymptomatic    COVID-19 vaccine series completed 5/20/2021, 6/22/2021    Moderna 5/20/2021, 6/22/2021    DDD (degenerative disc disease), lumbar     Depression     Esophageal adenocarcinoma (HCC)     Esophageal cancer (HCC)     INVASIVE ADENOCARCINOMA ARISING IN TUBULAR ADENOMA WITH HIGH GRADE DYSPLASIA, ASSOCIATED WITH FOCAL INTESTINAL METAPLASIA     Esophageal varices (HCC)     Fatty liver     GERD (gastroesophageal reflux disease)     GI bleed     Heart murmur     Hep C w/o coma, chronic (HCC)     Hiatal hernia     History of alcohol

## 2024-07-24 NOTE — PLAN OF CARE
Problem: Discharge Planning  Goal: Discharge to home or other facility with appropriate resources  7/24/2024 1223 by Talia Reid, RN  Outcome: Progressing  Flowsheets (Taken 7/24/2024 0429 by Argelia Rogers, RN)  Discharge to home or other facility with appropriate resources:   Identify barriers to discharge with patient and caregiver   Arrange for needed discharge resources and transportation as appropriate   Identify discharge learning needs (meds, wound care, etc)   Refer to discharge planning if patient needs post-hospital services based on physician order or complex needs related to functional status, cognitive ability or social support system  7/24/2024 0429 by Argelia Rogers, RN  Outcome: Progressing  Flowsheets (Taken 7/24/2024 0429)  Discharge to home or other facility with appropriate resources:   Identify barriers to discharge with patient and caregiver   Arrange for needed discharge resources and transportation as appropriate   Identify discharge learning needs (meds, wound care, etc)   Refer to discharge planning if patient needs post-hospital services based on physician order or complex needs related to functional status, cognitive ability or social support system  Note: Inform pt. Of discharge teaching and planned. Instructed pt. To inform me if further teaching need to be done.      Problem: Pain  Goal: Verbalizes/displays adequate comfort level or baseline comfort level  7/24/2024 1223 by Talia Reid, RN  Outcome: Progressing  Flowsheets (Taken 7/24/2024 0429 by Argelia Rogers, RN)  Verbalizes/displays adequate comfort level or baseline comfort level:   Encourage patient to monitor pain and request assistance   Assess pain using appropriate pain scale   Administer analgesics based on type and severity of pain and evaluate response   Implement non-pharmacological measures as appropriate and evaluate response   Consider cultural and social influences on pain and pain management

## 2024-07-24 NOTE — OP NOTE
[place] [address]                            OPERATIVE REPORT      PATIENT NAME: ALHAJI COOLEY              : 1960  MED REC NO: [id number]                          ROOM: [facility]  ACCOUNT NO: [id number]                       ADMIT DATE: 2024  PROVIDER: Júnior Cueva MD      DATE OF PROCEDURE:  2024    SURGEON:  Júnior Cueva MD    PREOPERATIVE DIAGNOSIS:  Osteoporotic compression fracture, T3.    POSTOPERATIVE DIAGNOSIS:  Osteoporotic compression fracture, T3.    PROCEDURE PERFORMED:  Kyphoplasty, T3.    ASSISTANT:  None.    ANESTHESIA:  General.    BLOOD LOSS:  Minimal.    COMPLICATIONS:  None.    SPECIMEN:  T3.    IMPLANTS:  Kyphon HV-R cement.    DRAINS:  None.    FINDINGS:  Satisfactory cement fill and interdigitation.    DESCRIPTION OF PROCEDURE:  The patient was taken to the operating room, placed under general anesthesia, transferred to the Brant table, checked for padding and positioning.  AP and lateral fluoroscopy were arranged for the procedure.  Table required a little bit of a tilt as the patient had a little bit of scoliosis.    After arranging AP and lateral imaging to ensure satisfactory imaging, back was prepped and draped in the usual sterile fashion.    With the aid of AP fluoroscopy, 18-gauge spinal needles were advanced from the skin to the pedicle blocking positions bilaterally, back injected.  Stab incisions were made.    The patient was now cannulated bipedicularly with minimal biplanar fluoroscopic images with initial blocking, mid pedicle, posterior wall, and final resting position.    On the left, we came a little bit more straight down the pedicle, which ended up in a little bit more of a lateral position with the light on the right hand side, we started either at the costal pedicle junction or a little bit transcostal and then entered the pedicle further through the vertebral

## 2024-07-25 LAB
ERYTHROCYTE [DISTWIDTH] IN BLOOD BY AUTOMATED COUNT: 14.4 % (ref 11.5–14.9)
HCT VFR BLD AUTO: 26.6 % (ref 41–53)
HGB BLD-MCNC: 8.8 G/DL (ref 13.5–17.5)
MCH RBC QN AUTO: 31.2 PG (ref 26–34)
MCHC RBC AUTO-ENTMCNC: 33.1 G/DL (ref 31–37)
MCV RBC AUTO: 94.2 FL (ref 80–100)
PLATELET # BLD AUTO: 144 K/UL (ref 150–450)
PMV BLD AUTO: 8.1 FL (ref 6–12)
PREALB SERPL-MCNC: 3.5 MG/DL (ref 20–40)
RBC # BLD AUTO: 2.82 M/UL (ref 4.5–5.9)
WBC OTHER # BLD: 7.7 K/UL (ref 3.5–11)

## 2024-07-25 PROCEDURE — 6370000000 HC RX 637 (ALT 250 FOR IP): Performed by: ORTHOPAEDIC SURGERY

## 2024-07-25 PROCEDURE — 97162 PT EVAL MOD COMPLEX 30 MIN: CPT

## 2024-07-25 PROCEDURE — 97166 OT EVAL MOD COMPLEX 45 MIN: CPT

## 2024-07-25 PROCEDURE — 99232 SBSQ HOSP IP/OBS MODERATE 35: CPT | Performed by: INTERNAL MEDICINE

## 2024-07-25 PROCEDURE — 97530 THERAPEUTIC ACTIVITIES: CPT

## 2024-07-25 PROCEDURE — 85027 COMPLETE CBC AUTOMATED: CPT

## 2024-07-25 PROCEDURE — 1200000000 HC SEMI PRIVATE

## 2024-07-25 PROCEDURE — 36415 COLL VENOUS BLD VENIPUNCTURE: CPT

## 2024-07-25 PROCEDURE — 84134 ASSAY OF PREALBUMIN: CPT

## 2024-07-25 PROCEDURE — 97116 GAIT TRAINING THERAPY: CPT

## 2024-07-25 RX ORDER — TIZANIDINE 2 MG/1
2 TABLET ORAL EVERY 6 HOURS PRN
Status: DISCONTINUED | OUTPATIENT
Start: 2024-07-25 | End: 2024-07-29 | Stop reason: HOSPADM

## 2024-07-25 RX ORDER — HYDROCODONE BITARTRATE AND ACETAMINOPHEN 5; 325 MG/1; MG/1
1 TABLET ORAL EVERY 6 HOURS PRN
Qty: 28 TABLET | Refills: 0 | Status: SHIPPED | OUTPATIENT
Start: 2024-07-25 | End: 2024-08-01

## 2024-07-25 RX ADMIN — METOPROLOL TARTRATE 12.5 MG: 25 TABLET, FILM COATED ORAL at 21:49

## 2024-07-25 RX ADMIN — LACTULOSE 20 G: 20 SOLUTION ORAL at 09:09

## 2024-07-25 RX ADMIN — PANTOPRAZOLE SODIUM 40 MG: 40 TABLET, DELAYED RELEASE ORAL at 21:48

## 2024-07-25 RX ADMIN — FERROUS SULFATE TAB 325 MG (65 MG ELEMENTAL FE) 325 MG: 325 (65 FE) TAB at 09:09

## 2024-07-25 RX ADMIN — HYDROCODONE BITARTRATE AND ACETAMINOPHEN 2 TABLET: 5; 325 TABLET ORAL at 09:31

## 2024-07-25 RX ADMIN — FOLIC ACID 1 MG: 1 TABLET ORAL at 09:09

## 2024-07-25 RX ADMIN — FERROUS SULFATE TAB 325 MG (65 MG ELEMENTAL FE) 325 MG: 325 (65 FE) TAB at 21:48

## 2024-07-25 RX ADMIN — Medication 400 MG: at 09:09

## 2024-07-25 RX ADMIN — HYDROCODONE BITARTRATE AND ACETAMINOPHEN 2 TABLET: 5; 325 TABLET ORAL at 16:22

## 2024-07-25 RX ADMIN — Medication 1 TABLET: at 09:09

## 2024-07-25 RX ADMIN — Medication 400 MG: at 21:48

## 2024-07-25 RX ADMIN — METOPROLOL TARTRATE 12.5 MG: 25 TABLET, FILM COATED ORAL at 09:10

## 2024-07-25 RX ADMIN — DULOXETINE HYDROCHLORIDE 30 MG: 30 CAPSULE, DELAYED RELEASE ORAL at 09:09

## 2024-07-25 RX ADMIN — PANTOPRAZOLE SODIUM 40 MG: 40 TABLET, DELAYED RELEASE ORAL at 09:09

## 2024-07-25 ASSESSMENT — PAIN DESCRIPTION - ONSET: ONSET: ON-GOING

## 2024-07-25 ASSESSMENT — PAIN DESCRIPTION - ORIENTATION
ORIENTATION: MID;RIGHT;LOWER
ORIENTATION: RIGHT;LOWER

## 2024-07-25 ASSESSMENT — PAIN DESCRIPTION - PAIN TYPE: TYPE: CHRONIC PAIN;ACUTE PAIN

## 2024-07-25 ASSESSMENT — PAIN SCALES - GENERAL
PAINLEVEL_OUTOF10: 7
PAINLEVEL_OUTOF10: 9

## 2024-07-25 ASSESSMENT — PAIN DESCRIPTION - LOCATION
LOCATION: BACK;ABDOMEN
LOCATION: BACK

## 2024-07-25 ASSESSMENT — PAIN DESCRIPTION - DESCRIPTORS: DESCRIPTORS: ACHING;SHARP

## 2024-07-25 ASSESSMENT — PAIN DESCRIPTION - FREQUENCY: FREQUENCY: CONTINUOUS

## 2024-07-25 ASSESSMENT — PAIN - FUNCTIONAL ASSESSMENT: PAIN_FUNCTIONAL_ASSESSMENT: ACTIVITIES ARE NOT PREVENTED

## 2024-07-25 NOTE — PROGRESS NOTES
Occupational Therapy  [facility]   Occupational Therapy Evaluation  Date: 24  Patient Name: Frank Lisa       Room: 4-01  MRN: [id number]  Account: [id number]   : 1960  (63 y.o.) Gender: male     Discharge Recommendations:  Further Occupational Therapy is recommended upon facility discharge.    OT Equipment Recommendations  Other: TBD    Referring Practitioner: Júnior Cueva MD  Diagnosis: Closed wedge compression fracture of T3 vertebra with routine healing   Additional Pertinent Hx: Per H&P note: The patient is a 63 y.o.  Non- / non  male who presents with Shortness of Breath and Fall (Earlier in the day about 2pm)   and he is admitted to the hospital for the management of  Patient is 63-year-old male with past medical history of atrial fibrillation not on any anticoagulation due to history of liver cirrhosis and GI bleed, COPD, alcoholic liver cirrhosis s/p TIPS procedure hepatitis C electrolyte abnormality, history of esophageal cancer and remission, previous history of bowel perforation with extensive bowel surgery, and recently had ventral hernia repair and SBO presented to the ER after fall.  Patient states that he tripped and fell, patient not sure if he passed out or not,     Patient had pan CT, found to have T3 fracture, admitted under trauma, medicine consulted for medical management.  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 Noted increased time to complete and difficulty mirroring movement.  Tone LUE  LUE Tone: Normotonic  LUE Strength  Gross LUE Strength: WFL  L Hand General: 4+/5  LUE Strength Comment: Grossly 4+/5    RUE AROM (degrees)  RUE AROM : WFL  RUE General AROM: Assessed at shoulder, elbow, and wrist.  Right Hand AROM (degrees)  Right Hand AROM: WFL  Right Hand General AROM: Assessed via thumb opposition. Noted increased time to complete and difficulty mirroring movement.  Tone RUE  RUE Tone: Normotonic  RUE Strength  Gross RUE Strength: WFL  R Hand General: 4+/5  RUE Strength Comment: Grossly 4+/5    Hand Dominance  Hand Dominance: Right    Fine Motor Skills/Coordination  Coordination  Movements Are Fluid And Coordinated: No  Coordination and Movement Description: Right UE, Left UE, Decreased accuracy        Bed Mobility  Bed mobility  Supine to Sit: Stand by assistance (Pt reports bout of dizziness with RB provided.)  Sit to Supine: Stand by assistance  Bed Mobility Comments: Pt in bed demo's restlessness with BLE. Pt completed bed mobility in quick manner with poor safety awareness. Pt at EOB attempts to prematurely stand despite verbal cues for safety.    Balance  Balance  Sitting Balance: Stand by assistance  Standing Balance: Contact guard assistance  Standing Balance  Time: Approx 2-3 minutes  Activity: Functional mobility/transfers  Comment: Pt completed with RW and CGA for safety. Pt very impulsive and requires Max verbal cues for safety. Pt requires cues for maintaining use of RW.    Transfers  Transfers  Sit to stand: Contact guard assistance  Stand to sit: Contact guard assistance  Transfer Comments: Pt completed with RW and CGA and Poor safety awareness. Pt requires Max verbal cues for safety.    Functional Mobility  Functional - Mobility Device: Rolling Walker  Activity: Other (In room to chair/bed)  Assist Level:

## 2024-07-25 NOTE — PROGRESS NOTES
Pt IV infiltrate and require a IV US. Writer called ICU RN to see if they will be able to come and start an IV. RN stated that she is in a 1:1 critical care pt and if she has time she will try to come and start one.     6:38: ICU RN wasn't able to come over and start an IV. Writer will pass along to day shift RN.

## 2024-07-25 NOTE — PLAN OF CARE
Problem: Discharge Planning  Goal: Discharge to home or other facility with appropriate resources  Outcome: Progressing  Flowsheets (Taken 7/25/2024 0444)  Discharge to home or other facility with appropriate resources:   Identify barriers to discharge with patient and caregiver   Arrange for needed discharge resources and transportation as appropriate   Identify discharge learning needs (meds, wound care, etc)   Refer to discharge planning if patient needs post-hospital services based on physician order or complex needs related to functional status, cognitive ability or social support system  Note: Inform pt. Of discharge teaching and planned. Instructed pt. To inform me if further teaching need to be done.      Problem: Pain  Goal: Verbalizes/displays adequate comfort level or baseline comfort level  Outcome: Progressing  Flowsheets (Taken 7/25/2024 0444)  Verbalizes/displays adequate comfort level or baseline comfort level:   Encourage patient to monitor pain and request assistance   Implement non-pharmacological measures as appropriate and evaluate response   Assess pain using appropriate pain scale   Administer analgesics based on type and severity of pain and evaluate response   Consider cultural and social influences on pain and pain management   Notify Licensed Independent Practitioner if interventions unsuccessful or patient reports new pain  Note: PT. Received pain meds in timely manner. Teach pt. Non-pharm. Methods to handle pain.      Problem: Safety - Adult  Goal: Free from fall injury  Outcome: Progressing  Flowsheets (Taken 7/25/2024 0444)  Free From Fall Injury:   Instruct family/caregiver on patient safety   Based on caregiver fall risk screen, instruct family/caregiver to ask for assistance with transferring infant if caregiver noted to have fall risk factors  Note: Made sure call light was in reach, a clear pathway and adequate lighting was provided. Also made sure that the pt. Was wearing  non-slip socks.

## 2024-07-25 NOTE — PROGRESS NOTES
General Surgery Progress Note    Subjective:     Patient without complaints. No nausea or vomiting. Voiding well.  Tolerated kyphoplasty well.    Scheduled Meds:   multivitamin  1 tablet Oral Daily    vitamin D  50,000 Units Oral Weekly    lactulose  20 g Oral Daily    magnesium oxide  400 mg Oral BID    potassium chloride  10 mEq Oral BID    lidocaine  1 patch TransDERmal Daily    DULoxetine  30 mg Oral Daily    ferrous sulfate  325 mg Oral BID    folic acid  1 mg Oral Daily    metoprolol tartrate  12.5 mg Oral BID    pantoprazole  40 mg Oral BID    sodium chloride flush  5-40 mL IntraVENous 2 times per day     Continuous Infusions:   sodium chloride 50 mL/hr at 07/24/24 1744    sodium chloride       PRN Meds:sodium chloride flush, sodium chloride, potassium chloride **OR** potassium alternative oral replacement **OR** potassium chloride, magnesium sulfate, melatonin, polyethylene glycol, bisacodyl, acetaminophen **OR** acetaminophen, morphine, ondansetron, HYDROcodone 5 mg - acetaminophen **OR** HYDROcodone 5 mg - acetaminophen, sodium chloride flush    Objective:   /74   Pulse 74   Temp 97.5 °F (36.4 °C)   Resp 17   Ht 1.753 m (5' 9\")   Wt 82.4 kg (181 lb 10.5 oz)   SpO2 93%   BMI 26.83 kg/m²     I/O last 3 completed shifts:  In: 340 [P.O.:240; I.V.:100]  Out: 640 [Urine:640]    Chest: Breath sounds were clear and equal with no rales, wheezes, or rhonchi.  Respiratory effort was normal with no retractions or use of accessory muscles.    Cardiovascular: Heart sounds were normal with a regular rate and rhythm.  There were no murmurs or gallops.    Abdomen:  Bowel sounds were normal.  The abdomen was soft and non distended.  Wound is clean with minimal serous drainage on dressing    LABS:  Lab Results   Component Value Date/Time    WBC 7.7 07/25/2024 05:58 AM    RBC 2.82 07/25/2024 05:58 AM    RBC 4.75 04/19/2012 11:30 AM    HGB 8.8 07/25/2024 05:58 AM    HCT 26.6 07/25/2024 05:58 AM    MCV 94.2  07/25/2024 05:58 AM    MCH 31.2 07/25/2024 05:58 AM    MCHC 33.1 07/25/2024 05:58 AM    RDW 14.4 07/25/2024 05:58 AM     07/25/2024 05:58 AM     04/19/2012 11:30 AM    MPV 8.1 07/25/2024 05:58 AM       Lab Results   Component Value Date/Time     07/24/2024 06:29 AM    K 4.7 07/24/2024 06:29 AM     07/24/2024 06:29 AM    CO2 25 07/24/2024 06:29 AM    BUN 6 07/24/2024 06:29 AM    CREATININE 0.9 07/24/2024 06:29 AM    GLUCOSE 103 07/24/2024 06:29 AM    GLUCOSE 71 04/21/2023 04:18 AM    CALCIUM 8.2 07/24/2024 06:29 AM       Assessment/Plan:   Status post kyphoplasty for T3 wedge fracture yesterday  Status post recent incisional herniorrhaphy with mesh with seroma drainage  Continue packing wound and dressing changes  Will follow-up in the office in 2 weeks.  Will check prealbumin and oral intake encouraged.      Electronically signed by Kvng Waddell DO  on 7/25/2024 at 7:11 AM

## 2024-07-25 NOTE — PLAN OF CARE
Problem: Discharge Planning  Goal: Discharge to home or other facility with appropriate resources  7/25/2024 1217 by Talia Reid RN  Outcome: Progressing  Flowsheets (Taken 7/25/2024 0444 by Argelia Rogers, RN)  Discharge to home or other facility with appropriate resources:   Identify barriers to discharge with patient and caregiver   Arrange for needed discharge resources and transportation as appropriate   Identify discharge learning needs (meds, wound care, etc)   Refer to discharge planning if patient needs post-hospital services based on physician order or complex needs related to functional status, cognitive ability or social support system  7/25/2024 0444 by Argelia Rogers, RN  Outcome: Progressing  Flowsheets (Taken 7/25/2024 0444)  Discharge to home or other facility with appropriate resources:   Identify barriers to discharge with patient and caregiver   Arrange for needed discharge resources and transportation as appropriate   Identify discharge learning needs (meds, wound care, etc)   Refer to discharge planning if patient needs post-hospital services based on physician order or complex needs related to functional status, cognitive ability or social support system  Note: Inform pt. Of discharge teaching and planned. Instructed pt. To inform me if further teaching need to be done.      Problem: Pain  Goal: Verbalizes/displays adequate comfort level or baseline comfort level  7/25/2024 1217 by Talia Reid, RN  Outcome: Progressing  Flowsheets (Taken 7/25/2024 0444 by Argelia Rogers, RN)  Verbalizes/displays adequate comfort level or baseline comfort level:   Encourage patient to monitor pain and request assistance   Implement non-pharmacological measures as appropriate and evaluate response   Assess pain using appropriate pain scale   Administer analgesics based on type and severity of pain and evaluate response   Consider cultural and social influences on pain and pain management    Notify Licensed Independent Practitioner if interventions unsuccessful or patient reports new pain  7/25/2024 0444 by Argelia Rogers, RN  Outcome: Progressing  Flowsheets (Taken 7/25/2024 0444)  Verbalizes/displays adequate comfort level or baseline comfort level:   Encourage patient to monitor pain and request assistance   Implement non-pharmacological measures as appropriate and evaluate response   Assess pain using appropriate pain scale   Administer analgesics based on type and severity of pain and evaluate response   Consider cultural and social influences on pain and pain management   Notify Licensed Independent Practitioner if interventions unsuccessful or patient reports new pain  Note: PT. Received pain meds in timely manner. Teach pt. Non-pharm. Methods to handle pain.      Problem: Safety - Adult  Goal: Free from fall injury  7/25/2024 1217 by Talia Reid RN  Outcome: Progressing  Flowsheets (Taken 7/25/2024 0444 by Argelia Rogers, RN)  Free From Fall Injury:   Instruct family/caregiver on patient safety   Based on caregiver fall risk screen, instruct family/caregiver to ask for assistance with transferring infant if caregiver noted to have fall risk factors  7/25/2024 0444 by Argelia Rogers, RN  Outcome: Progressing  Flowsheets (Taken 7/25/2024 0444)  Free From Fall Injury:   Instruct family/caregiver on patient safety   Based on caregiver fall risk screen, instruct family/caregiver to ask for assistance with transferring infant if caregiver noted to have fall risk factors  Note: Made sure call light was in reach, a clear pathway and adequate lighting was provided. Also made sure that the pt. Was wearing non-slip socks.      Problem: ABCDS Injury Assessment  Goal: Absence of physical injury  Outcome: Progressing  Flowsheets (Taken 7/23/2024 1742 by Cande Coughlin, RN)  Absence of Physical Injury: Implement safety measures based on patient assessment     Problem: Skin/Tissue Integrity  Goal:

## 2024-07-25 NOTE — PROGRESS NOTES
Surgical Progress Note    POD:      Patient doing fairly well  Vitals:    24 1655   BP: (!) 161/77   Pulse: 69   Resp: 16   Temp: 97.9 °F (36.6 °C)   SpO2: 98%      Temp (24hrs), Av.1 °F (36.7 °C), Min:97.5 °F (36.4 °C), Max:98.4 °F (36.9 °C)       Pain Control good  No unusual nausea    Exam: no change        Lungs:  No respiratory distress    Labs reviewed:  Labs:  WBC/Hgb/Hct/Plts:  7.7/8.8/26.6/144 (558)  BUN/Cr/glu/ALT/AST/amyl/lip:  6/0.9/--/--/--/--/-- (629)     Na/K/Cl/CO2:  140/4.7/106/25 (629)    I/O last 3 completed shifts:  In: 340 [P.O.:240; I.V.:100]  Out: 640 [Urine:640]    Assessment:    Patient Active Problem List   Diagnosis    Back pain, chronic    Hearing difficulty    GERD (gastroesophageal reflux disease)    Cervical radicular pain    Hypomagnesemia    Chronic viral hepatitis B without delta agent and without coma (HCC)    Calculus of gallbladder without cholecystitis    Hep C w/o coma, chronic (HCC)    Fatty liver    Psychophysiologic insomnia    Cirrhosis (HCC)    Decompensation of cirrhosis of liver (HCC)    Vertebrogenic low back pain    DDD (degenerative disc disease), lumbar    Depression    Tubular adenoma of colon    History of colon polyps    Gynecomastia, male    Lumbar disc herniation    Tinnitus    Eustachian tube dysfunction    Ganglion cyst    Carpal tunnel syndrome of right wrist    History of hepatitis C    Vitamin D deficiency    Pure hypercholesterolemia    Hypokalemia    Essential hypertension    Recurrent major depressive disorder in partial remission (HCC)    S/P epidural steroid injection    Elevated LFTs    Seasonal allergies    Lumbar facet arthropathy    Cervical facet syndrome    Thrombocytopenia (HCC)    Hepatitis C virus infection resolved after antiviral drug therapy    Alcohol abuse    Altered mental status    Hypocalcemia    Hypophosphatemia    Malignant neoplasm of lower third of esophagus (HCC)    COVID-19    Anxiety    Current

## 2024-07-25 NOTE — PROGRESS NOTES
Physical Therapy  Facility/Department: [facility] MED SURG  Physical Therapy Initial Assessment    Name: Frank Lisa  : 1960  MRN: [id number]  Date of Service: 2024    Discharge Recommendations:  Patient would benefit from continued therapy after discharge, Therapy recommended at discharge   PT Equipment Recommendations  Equipment Needed: No (has RW and cane at home)      Patient Diagnosis(es): The primary encounter diagnosis was Other closed fracture of third thoracic vertebra, initial encounter (HCC). Diagnoses of Hypomagnesemia, Hypokalemia, Shortness of breath, and Compression fracture of T3 vertebra, initial encounter (HCC) were also pertinent to this visit.  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), Alcoholic cirrhosis of liver with ascites (HCC), Anemia, Anxiety, Arthritis, Back pain, chronic, Lezama esophagus, Bleeding gastric varices, Bowel perforation (HCC), BPH (benign prostatic hypertrophy), Cholelithiasis, Cirrhosis (HCC), COVID-19, COVID-19 vaccine series completed, DDD (degenerative disc disease), lumbar, Depression, Esophageal adenocarcinoma (HCC), Esophageal cancer (HCC), Esophageal varices (HCC), Fatty liver, GERD (gastroesophageal reflux disease), GI bleed, Heart murmur, Hep C w/o coma, chronic (HCC), Hiatal hernia, History of alcohol abuse, History of blood transfusion, History of colon polyps, History of tobacco abuse, [place] (hard of hearing), Hyperlipidemia, Hypertension, Hyponatremia, Hypotension, Ileus (HCC), Lumbar radiculitis, Malignant neoplasm of esophagus (HCC), Mastoid disorder, bilateral, Pericardial effusion, PONV (postoperative nausea and vomiting), Poor venous access, Port-A-Cath in place, Portal hypertension (HCC), SBO (small bowel obstruction) (HCC), Sciatica, Secondary esophageal varices (HCC), Shortness of breath, Sleep difficulties,

## 2024-07-25 NOTE — CARE COORDINATION
ONGOING DISCHARGE PLAN:    Patient is alert and oriented x4.    Spoke with patient regarding discharge plan and patient confirms that plan is still [facility]. Kenyetta will start auth today.     POD #1 T3 Kyphoplasty    PT/OT        Will continue to follow for additional discharge needs.    If patient is discharged prior to next notation, then this note serves as note for discharge by case management.    Electronically signed by Dianne Jarquin RN on 7/25/2024 at 3:44 PM

## 2024-07-25 NOTE — PLAN OF CARE
Problem: Discharge Planning  Goal: Discharge to home or other facility with appropriate resources  7/25/2024 1928 by Annie Johns RN  Outcome: Adequate for Discharge  7/25/2024 1217 by Talia Reid RN  Outcome: Progressing  Flowsheets (Taken 7/25/2024 0444 by Argelia Rogers, RN)  Discharge to home or other facility with appropriate resources:   Identify barriers to discharge with patient and caregiver   Arrange for needed discharge resources and transportation as appropriate   Identify discharge learning needs (meds, wound care, etc)   Refer to discharge planning if patient needs post-hospital services based on physician order or complex needs related to functional status, cognitive ability or social support system     Problem: Pain  Goal: Verbalizes/displays adequate comfort level or baseline comfort level  7/25/2024 1928 by Annie Johns RN  Outcome: Adequate for Discharge  7/25/2024 1217 by Talia Reid RN  Outcome: Progressing  Flowsheets (Taken 7/25/2024 0444 by Argelia Rogers, RN)  Verbalizes/displays adequate comfort level or baseline comfort level:   Encourage patient to monitor pain and request assistance   Implement non-pharmacological measures as appropriate and evaluate response   Assess pain using appropriate pain scale   Administer analgesics based on type and severity of pain and evaluate response   Consider cultural and social influences on pain and pain management   Notify Licensed Independent Practitioner if interventions unsuccessful or patient reports new pain     Problem: Safety - Adult  Goal: Free from fall injury  7/25/2024 1928 by Anine Johns, RN  Outcome: Adequate for Discharge  7/25/2024 1217 by Talia Reid RN  Outcome: Progressing  Flowsheets (Taken 7/25/2024 0444 by Argelia Rogers, RN)  Free From Fall Injury:   Instruct family/caregiver on patient safety   Based on caregiver fall risk screen, instruct family/caregiver to ask for assistance with  transferring infant if caregiver noted to have fall risk factors     Problem: ABCDS Injury Assessment  Goal: Absence of physical injury  7/25/2024 1928 by Annie Johns, RN  Outcome: Adequate for Discharge  7/25/2024 1217 by Talia Reid RN  Outcome: Progressing  Flowsheets (Taken 7/23/2024 1742 by Cande Coughlin RN)  Absence of Physical Injury: Implement safety measures based on patient assessment     Problem: Skin/Tissue Integrity  Goal: Absence of new skin breakdown  Description: 1.  Monitor for areas of redness and/or skin breakdown  2.  Assess vascular access sites hourly  3.  Every 4-6 hours minimum:  Change oxygen saturation probe site  4.  Every 4-6 hours:  If on nasal continuous positive airway pressure, respiratory therapy assess nares and determine need for appliance change or resting period.  7/25/2024 1928 by Annie Johns, RN  Outcome: Adequate for Discharge  7/25/2024 1217 by Talia Reid RN  Outcome: Progressing

## 2024-07-25 NOTE — PROGRESS NOTES
[facility]   IN-PATIENT SERVICE   [facility]    HISTORY AND PHYSICAL EXAMINATION            Date:   7/25/2024  Patient name:  Frank Lisa  Date of admission:  7/20/2024  8:18 PM  MRN:   [id number]  Account:  [id number]  YOB: 1960  PCP:    Lopez Rosario PA  Room:   2042044[facility]  Code Status:    Full Code    Chief Complaint:     Chief Complaint   Patient presents with    Shortness of Breath    Fall     Earlier in the day about 2pm       History Obtained From:     patient    History of Present Illness:     The patient is a 63 y.o.  Non- / non  male who presents with Shortness of Breath and Fall (Earlier in the day about 2pm)   and he is admitted to the hospital for the management of      Patient is 63-year-old male with past medical history of atrial fibrillation not on any anticoagulation due to history of liver cirrhosis and GI bleed, COPD, alcoholic liver cirrhosis s/p TIPS procedure hepatitis C electrolyte abnormality, history of esophageal cancer and remission, previous history of bowel perforation with extensive bowel surgery, and recently had ventral hernia repair and SBO presented to the ER after fall.  Patient states that he tripped and fell, patient not sure if he passed out or not,    Patient had pan CT, found to have T3 fracture, admitted under trauma, medicine consulted for medical management.  Breath    7/23/2024  Patient reports pain in the back to be stable  Is n.p.o. 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 Mc, orthopedic, every 3-4 months,  extremities  BEHAVIOR/PSYCH:  negative for depression, anxiety    Physical Exam:   /74   Pulse 74   Temp 97.5 °F (36.4 °C)   Resp 17   Ht 1.753 m (5' 9\")   Wt 82.4 kg (181 lb 10.5 oz)   SpO2 93%   BMI 26.83 kg/m²   Temp (24hrs), Av.2 °F (36.8 °C), Min:97.5 °F (36.4 °C), Max:98.7 °F (37.1 °C)    Recent Labs     24  1415   POCGLU 80       Intake/Output Summary (Last 24 hours) at 2024 0724  Last data filed at 2024 2200  Gross per 24 hour   Intake 340 ml   Output 640 ml   Net -300 ml         General Appearance:  alert, well appearing, and in no acute distress  Mental status: oriented to person, place, and time with normal affect  Head:  normocephalic, atraumatic.  Eye: no icterus, redness, pupils equal and reactive, extraocular eye movements intact, conjunctiva clear  Ear: normal external ear, no discharge, hearing intact  Nose:  no drainage noted  Mouth: mucous membranes moist  Neck: supple, no carotid bruits, thyroid not palpable  Lungs: Bilateral equal air entry, clear to ausculation, no wheezing, rales or rhonchi, normal effort  Cardiovascular: normal rate, regular rhythm, no murmur, gallop, rub.  Abdomen: Dressing in place  Please neurologic: There are no new focal motor or sensory deficits, normal muscle tone and bulk, no abnormal sensation, normal speech, cranial nerves II through XII grossly intact  Skin: No gross lesions, rashes, bruising or bleeding on exposed skin area  Extremities:  peripheral pulses palpable, no pedal edema or calf pain with palpation  Psych: normal affect     Investigations:      Laboratory Testing:  Recent Results (from the past 24 hour(s))   POC Glucose Fingerstick    Collection Time: 24  2:15 PM   Result Value Ref Range    POC Glucose 80 75 - 110 mg/dL   CBC    Collection Time: 24  5:58 AM   Result Value Ref Range    WBC 7.7 3.5 - 11.0 k/uL    RBC 2.82 (L) 4.5 - 5.9 m/uL    Hemoglobin 8.8 (L) 13.5 - 17.5 g/dL    Hematocrit 26.6 (L) 41 - 53 %

## 2024-07-25 NOTE — PROGRESS NOTES
07/25/24 1317   Encounter Summary   Encounter Overview/Reason Spiritual/Emotional Needs   Service Provided For Patient and family together   Referral/Consult From [facility]   Support System Family members   Last Encounter  07/25/24   Complexity of Encounter Low   Spiritual/Emotional needs   Type Spiritual Support   Assessment/Intervention/Outcome   Assessment Calm   Intervention Active listening;Explored/Affirmed feelings, thoughts, concerns;Discussed illness injury and it’s impact;Prayer (assurance of)/Lando;Sustaining Presence/Ministry of presence   Outcome Engaged in conversation;Expressed feelings, needs, and concerns;Expressed Gratitude

## 2024-07-26 ENCOUNTER — CARE COORDINATION (OUTPATIENT)
Dept: CARE COORDINATION | Age: 64
End: 2024-07-26

## 2024-07-26 LAB — SURGICAL PATHOLOGY REPORT: NORMAL

## 2024-07-26 PROCEDURE — 6370000000 HC RX 637 (ALT 250 FOR IP): Performed by: ORTHOPAEDIC SURGERY

## 2024-07-26 PROCEDURE — 1200000000 HC SEMI PRIVATE

## 2024-07-26 PROCEDURE — 6370000000 HC RX 637 (ALT 250 FOR IP): Performed by: INTERNAL MEDICINE

## 2024-07-26 PROCEDURE — 99232 SBSQ HOSP IP/OBS MODERATE 35: CPT | Performed by: INTERNAL MEDICINE

## 2024-07-26 RX ORDER — ONDANSETRON 4 MG/1
4 TABLET, FILM COATED ORAL EVERY 8 HOURS PRN
Status: DISCONTINUED | OUTPATIENT
Start: 2024-07-26 | End: 2024-07-29 | Stop reason: HOSPADM

## 2024-07-26 RX ADMIN — FOLIC ACID 1 MG: 1 TABLET ORAL at 08:17

## 2024-07-26 RX ADMIN — HYDROCODONE BITARTRATE AND ACETAMINOPHEN 2 TABLET: 5; 325 TABLET ORAL at 20:34

## 2024-07-26 RX ADMIN — DULOXETINE HYDROCHLORIDE 30 MG: 30 CAPSULE, DELAYED RELEASE ORAL at 08:17

## 2024-07-26 RX ADMIN — FERROUS SULFATE TAB 325 MG (65 MG ELEMENTAL FE) 325 MG: 325 (65 FE) TAB at 08:17

## 2024-07-26 RX ADMIN — ONDANSETRON HYDROCHLORIDE 4 MG: 4 TABLET, FILM COATED ORAL at 16:43

## 2024-07-26 RX ADMIN — HYDROCODONE BITARTRATE AND ACETAMINOPHEN 2 TABLET: 5; 325 TABLET ORAL at 16:33

## 2024-07-26 RX ADMIN — HYDROCODONE BITARTRATE AND ACETAMINOPHEN 2 TABLET: 5; 325 TABLET ORAL at 12:09

## 2024-07-26 RX ADMIN — PANTOPRAZOLE SODIUM 40 MG: 40 TABLET, DELAYED RELEASE ORAL at 08:17

## 2024-07-26 RX ADMIN — METOPROLOL TARTRATE 12.5 MG: 25 TABLET, FILM COATED ORAL at 08:17

## 2024-07-26 RX ADMIN — Medication 3 MG: at 20:34

## 2024-07-26 RX ADMIN — FERROUS SULFATE TAB 325 MG (65 MG ELEMENTAL FE) 325 MG: 325 (65 FE) TAB at 20:34

## 2024-07-26 RX ADMIN — Medication 400 MG: at 08:17

## 2024-07-26 RX ADMIN — LACTULOSE 20 G: 20 SOLUTION ORAL at 08:16

## 2024-07-26 RX ADMIN — Medication 400 MG: at 20:34

## 2024-07-26 RX ADMIN — PANTOPRAZOLE SODIUM 40 MG: 40 TABLET, DELAYED RELEASE ORAL at 20:34

## 2024-07-26 RX ADMIN — METOPROLOL TARTRATE 12.5 MG: 25 TABLET, FILM COATED ORAL at 20:34

## 2024-07-26 RX ADMIN — Medication 1 TABLET: at 08:17

## 2024-07-26 RX ADMIN — HYDROCODONE BITARTRATE AND ACETAMINOPHEN 2 TABLET: 5; 325 TABLET ORAL at 08:16

## 2024-07-26 ASSESSMENT — PAIN DESCRIPTION - LOCATION
LOCATION: BACK
LOCATION: ABDOMEN;BACK
LOCATION: BACK;ABDOMEN
LOCATION: BACK;ABDOMEN

## 2024-07-26 ASSESSMENT — PAIN SCALES - GENERAL
PAINLEVEL_OUTOF10: 0
PAINLEVEL_OUTOF10: 8
PAINLEVEL_OUTOF10: 8
PAINLEVEL_OUTOF10: 7
PAINLEVEL_OUTOF10: 7
PAINLEVEL_OUTOF10: 6

## 2024-07-26 ASSESSMENT — PAIN DESCRIPTION - ORIENTATION
ORIENTATION: MID
ORIENTATION: RIGHT;MID;UPPER
ORIENTATION: MID
ORIENTATION: MID

## 2024-07-26 ASSESSMENT — PAIN DESCRIPTION - DESCRIPTORS
DESCRIPTORS: ACHING;SHARP
DESCRIPTORS: ACHING

## 2024-07-26 ASSESSMENT — PAIN - FUNCTIONAL ASSESSMENT: PAIN_FUNCTIONAL_ASSESSMENT: PREVENTS OR INTERFERES WITH MANY ACTIVE NOT PASSIVE ACTIVITIES

## 2024-07-26 NOTE — PROGRESS NOTES
[facility]   IN-PATIENT SERVICE   [facility]    HISTORY AND PHYSICAL EXAMINATION            Date:   7/26/2024  Patient name:  Frank Lisa  Date of admission:  7/20/2024  8:18 PM  MRN:   [id number]  Account:  [id number]  YOB: 1960  PCP:    Lopez Rosario PA  Room:   204/2044[facility]  Code Status:    Full Code    Chief Complaint:     Chief Complaint   Patient presents with    Shortness of Breath    Fall     Earlier in the day about 2pm       History Obtained From:     patient    History of Present Illness:     The patient is a 63 y.o.  Non- / non  male who presents with Shortness of Breath and Fall (Earlier in the day about 2pm)   and he is admitted to the hospital for the management of      Patient is 63-year-old male with past medical history of atrial fibrillation not on any anticoagulation due to history of liver cirrhosis and GI bleed, COPD, alcoholic liver cirrhosis s/p TIPS procedure hepatitis C electrolyte abnormality, history of esophageal cancer and remission, previous history of bowel perforation with extensive bowel surgery, and recently had ventral hernia repair and SBO presented to the ER after fall.  Patient states that he tripped and fell, patient not sure if he passed out or not,    Patient had pan CT, found to have T3 fracture, admitted under trauma, medicine consulted for medical management.  Breath    7/23/2024  Patient reports pain in the back to be stable  Is n.p.o. 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 Mc, orthopedic, every 3-4 months,

## 2024-07-26 NOTE — PROGRESS NOTES
General Surgery Progress Note    Subjective:     Patient without complaints. No nausea or vomiting. Voiding well.     Scheduled Meds:   multivitamin  1 tablet Oral Daily    vitamin D  50,000 Units Oral Weekly    lactulose  20 g Oral Daily    magnesium oxide  400 mg Oral BID    [Held by provider] potassium chloride  10 mEq Oral BID    lidocaine  1 patch TransDERmal Daily    DULoxetine  30 mg Oral Daily    ferrous sulfate  325 mg Oral BID    folic acid  1 mg Oral Daily    metoprolol tartrate  12.5 mg Oral BID    pantoprazole  40 mg Oral BID    sodium chloride flush  5-40 mL IntraVENous 2 times per day     Continuous Infusions:   sodium chloride       PRN Meds:tiZANidine, sodium chloride flush, sodium chloride, potassium chloride **OR** potassium alternative oral replacement **OR** potassium chloride, magnesium sulfate, melatonin, polyethylene glycol, bisacodyl, acetaminophen **OR** acetaminophen, morphine, ondansetron, HYDROcodone 5 mg - acetaminophen **OR** HYDROcodone 5 mg - acetaminophen, sodium chloride flush    Objective:   /64   Pulse 73   Temp 99.1 °F (37.3 °C) (Oral)   Resp 18   Ht 1.753 m (5' 9\")   Wt 82 kg (180 lb 12.4 oz)   SpO2 92%   BMI 26.70 kg/m²     I/O last 3 completed shifts:  In: 1050 [P.O.:950; I.V.:100]  Out: 640 [Urine:640]    Chest: Breath sounds were clear and equal with no rales, wheezes, or rhonchi.      Cardiovascular: Heart sounds were normal with a regular rate and rhythm.      Abdomen:  Bowel sounds were normal.  The abdomen was soft and non distended.  Wound is clean with minimal serous drainage    LABS:  Lab Results   Component Value Date/Time    WBC 7.7 07/25/2024 05:58 AM    RBC 2.82 07/25/2024 05:58 AM    RBC 4.75 04/19/2012 11:30 AM    HGB 8.8 07/25/2024 05:58 AM    HCT 26.6 07/25/2024 05:58 AM    MCV 94.2 07/25/2024 05:58 AM    MCH 31.2 07/25/2024 05:58 AM    MCHC 33.1 07/25/2024 05:58 AM    RDW 14.4 07/25/2024 05:58 AM     07/25/2024 05:58 AM

## 2024-07-26 NOTE — CARE COORDINATION
ONGOING DISCHARGE PLAN:    Patient is alert and oriented x4.    Spoke with patient regarding discharge plan and patient confirms that plan is still [facility]. Kenyetta will start auth 7/25.    POD #2 T3 Kyphoplasty    PT/OT      Will continue to follow for additional discharge needs.    If patient is discharged prior to next notation, then this note serves as note for discharge by case management.    Electronically signed by Dianne Jarquin RN on 7/26/2024 at 3:43 PM

## 2024-07-26 NOTE — CARE COORDINATION
Incoming call from patient contactNilam. States concern re: patient discharge disposition and safety concerns. Directed to contact IP DC planner/ care coordinator. Advised to have patient complete HIPAA form.

## 2024-07-26 NOTE — PROGRESS NOTES
Surgical Progress Note    POD:      Patient doing fairly well  Vitals:    24 08   BP: 128/68   Pulse: 77   Resp: 20   Temp: 97.3 °F (36.3 °C)   SpO2: 95%      Temp (24hrs), Av.5 °F (36.9 °C), Min:97.3 °F (36.3 °C), Max:99.1 °F (37.3 °C)       Pain Control good  No unusual nausea    Exam: doing fair        Lungs:  No respiratory distress    Labs reviewed:  Labs:  WBC/Hgb/Hct/Plts:  7.7/8.8/26.6/144 (558)  BUN/Cr/glu/ALT/AST/amyl/lip:  6/0.9/--/--/--/--/-- (629)     Na/K/Cl/CO2:  140/4.7/106/25 (629)    I/O last 3 completed shifts:  In: 950 [P.O.:950]  Out: 1200 [Urine:1200]    Assessment:    Patient Active Problem List   Diagnosis    Back pain, chronic    Hearing difficulty    GERD (gastroesophageal reflux disease)    Cervical radicular pain    Hypomagnesemia    Chronic viral hepatitis B without delta agent and without coma (HCC)    Calculus of gallbladder without cholecystitis    Hep C w/o coma, chronic (HCC)    Fatty liver    Psychophysiologic insomnia    Cirrhosis (HCC)    Decompensation of cirrhosis of liver (HCC)    Vertebrogenic low back pain    DDD (degenerative disc disease), lumbar    Depression    Tubular adenoma of colon    History of colon polyps    Gynecomastia, male    Lumbar disc herniation    Tinnitus    Eustachian tube dysfunction    Ganglion cyst    Carpal tunnel syndrome of right wrist    History of hepatitis C    Vitamin D deficiency    Pure hypercholesterolemia    Hypokalemia    Essential hypertension    Recurrent major depressive disorder in partial remission (HCC)    S/P epidural steroid injection    Elevated LFTs    Seasonal allergies    Lumbar facet arthropathy    Cervical facet syndrome    Thrombocytopenia (HCC)    Hepatitis C virus infection resolved after antiviral drug therapy    Alcohol abuse    Altered mental status    Hypocalcemia    Hypophosphatemia    Malignant neoplasm of lower third of esophagus (HCC)    COVID-19    Anxiety    Current smoker

## 2024-07-27 PROBLEM — S22.039A CLOSED FRACTURE OF THIRD THORACIC VERTEBRA (HCC): Status: ACTIVE | Noted: 2024-07-20

## 2024-07-27 PROCEDURE — 99232 SBSQ HOSP IP/OBS MODERATE 35: CPT | Performed by: INTERNAL MEDICINE

## 2024-07-27 PROCEDURE — 1200000000 HC SEMI PRIVATE

## 2024-07-27 PROCEDURE — 6370000000 HC RX 637 (ALT 250 FOR IP): Performed by: INTERNAL MEDICINE

## 2024-07-27 PROCEDURE — 6370000000 HC RX 637 (ALT 250 FOR IP): Performed by: ORTHOPAEDIC SURGERY

## 2024-07-27 PROCEDURE — 99024 POSTOP FOLLOW-UP VISIT: CPT | Performed by: ORTHOPAEDIC SURGERY

## 2024-07-27 RX ADMIN — HYDROCODONE BITARTRATE AND ACETAMINOPHEN 2 TABLET: 5; 325 TABLET ORAL at 16:52

## 2024-07-27 RX ADMIN — HYDROCODONE BITARTRATE AND ACETAMINOPHEN 2 TABLET: 5; 325 TABLET ORAL at 22:35

## 2024-07-27 RX ADMIN — PANTOPRAZOLE SODIUM 40 MG: 40 TABLET, DELAYED RELEASE ORAL at 19:46

## 2024-07-27 RX ADMIN — FERROUS SULFATE TAB 325 MG (65 MG ELEMENTAL FE) 325 MG: 325 (65 FE) TAB at 08:50

## 2024-07-27 RX ADMIN — Medication 1 TABLET: at 08:50

## 2024-07-27 RX ADMIN — DULOXETINE HYDROCHLORIDE 30 MG: 30 CAPSULE, DELAYED RELEASE ORAL at 08:51

## 2024-07-27 RX ADMIN — FOLIC ACID 1 MG: 1 TABLET ORAL at 08:51

## 2024-07-27 RX ADMIN — PANTOPRAZOLE SODIUM 40 MG: 40 TABLET, DELAYED RELEASE ORAL at 08:49

## 2024-07-27 RX ADMIN — METOPROLOL TARTRATE 12.5 MG: 25 TABLET, FILM COATED ORAL at 19:47

## 2024-07-27 RX ADMIN — LACTULOSE 20 G: 20 SOLUTION ORAL at 08:51

## 2024-07-27 RX ADMIN — ONDANSETRON HYDROCHLORIDE 4 MG: 4 TABLET, FILM COATED ORAL at 01:21

## 2024-07-27 RX ADMIN — METOPROLOL TARTRATE 12.5 MG: 25 TABLET, FILM COATED ORAL at 08:50

## 2024-07-27 RX ADMIN — FERROUS SULFATE TAB 325 MG (65 MG ELEMENTAL FE) 325 MG: 325 (65 FE) TAB at 19:47

## 2024-07-27 RX ADMIN — Medication 400 MG: at 08:49

## 2024-07-27 RX ADMIN — Medication 400 MG: at 19:47

## 2024-07-27 RX ADMIN — ONDANSETRON HYDROCHLORIDE 4 MG: 4 TABLET, FILM COATED ORAL at 16:53

## 2024-07-27 ASSESSMENT — PAIN DESCRIPTION - ORIENTATION
ORIENTATION: RIGHT;LEFT
ORIENTATION: RIGHT

## 2024-07-27 ASSESSMENT — PAIN DESCRIPTION - LOCATION
LOCATION: ABDOMEN
LOCATION: ABDOMEN

## 2024-07-27 ASSESSMENT — PAIN SCALES - GENERAL
PAINLEVEL_OUTOF10: 0
PAINLEVEL_OUTOF10: 8
PAINLEVEL_OUTOF10: 0
PAINLEVEL_OUTOF10: 0

## 2024-07-27 ASSESSMENT — PAIN DESCRIPTION - DESCRIPTORS
DESCRIPTORS: SHARP
DESCRIPTORS: ACHING;SORE

## 2024-07-27 NOTE — PROGRESS NOTES
Foraminal stenosis of cervical region    Spinal stenosis of lumbar region with neurogenic claudication    Low hemoglobin    Anemia    Hypotension    Former smoker, 50+ pack years, quit 2016    HLD (hyperlipidemia)    Acute on chronic anemia    Acute kidney injury (HCC)    Ascites due to alcoholic cirrhosis (HCC)    Shortness of breath    Drop in hemoglobin    Esophageal polyp    Acute kidney failure, unspecified (HCC)    Muscle weakness (generalized)    Other abnormalities of gait and mobility    GI bleed    Goals of care, counseling/discussion    ACP (advance care planning)    Palliative care encounter    S/P TIPS (transjugular intrahepatic portosystemic shunt)    Acute metabolic encephalopathy    Lezama's esophagus with dysplasia    Mastoid disorder, bilateral    Acute encephalopathy    Chronic hepatitis C without hepatic coma (HCC)    Swelling of joint of upper arm, right    Anasarca    Lightheadedness    Alcohol withdrawal syndrome, with delirium (HCC)    Hemorrhage of rectum and anus    Electrolyte imbalance    Hyponatremia    Pain of upper abdomen    Moderate malnutrition (HCC)    Delirium due to another medical condition    ROBYN (acute kidney injury) (HCC)    Ileostomy in place (HCC)    Peristomal dermatitis associated with moisture    Cellulitis    Coffee ground emesis    Substance abuse (HCC)    History of abdominal surgery    Status post reversal of ileostomy    Neck pain, chronic    Peripheral polyneuropathy    Ulnar neuropathy of both upper extremities    Intractable nausea and vomiting    Right lower quadrant abdominal pain    Nausea    Elevated CEA    Abnormal abdominal CT scan    Generalized weakness    High carcinoembryonic antigen (CEA)    Wrist arthritis    Pain in joint involving right ankle and foot    Syncope    Other microscopic hematuria    Dizziness    Acute pancreatitis without infection or necrosis    Small bowel obstruction (HCC)    Closed wedge compression fracture of T3 vertebra with  routine healing    Age-related osteoporosis with current pathological fracture with delayed healing    Acute midline thoracic back pain       Plan:  See my orders  Await placement    Júnior Cueva MD MD  7/27/2024 10:40 AM

## 2024-07-27 NOTE — PLAN OF CARE
Problem: Safety - Adult  Goal: Free from fall injury  Recent Flowsheet Documentation  Taken 7/27/2024 0730 by Kishore Olson RN  Free From Fall Injury: Instruct family/caregiver on patient safety     Problem: ABCDS Injury Assessment  Goal: Absence of physical injury  Recent Flowsheet Documentation  Taken 7/27/2024 0730 by Kishore Olson RN  Absence of Physical Injury: Implement safety measures based on patient assessment     Problem: Discharge Planning  Goal: Discharge to home or other facility with appropriate resources  Recent Flowsheet Documentation  Taken 7/27/2024 0730 by Kishore Olson RN  Discharge to home or other facility with appropriate resources:   Identify barriers to discharge with patient and caregiver   Arrange for needed discharge resources and transportation as appropriate   Identify discharge learning needs (meds, wound care, etc)

## 2024-07-27 NOTE — PROGRESS NOTES
[facility]   IN-PATIENT SERVICE   [facility]    HISTORY AND PHYSICAL EXAMINATION            Date:   7/27/2024  Patient name:  Frank Lisa  Date of admission:  7/20/2024  8:18 PM  MRN:   [id number]  Account:  [id number]  YOB: 1960  PCP:    Lopez Rosario PA  Room:   [id number]  Code Status:    Full Code    Chief Complaint:     Chief Complaint   Patient presents with    Shortness of Breath    Fall     Earlier in the day about 2pm       History Obtained From:     patient    History of Present Illness:     The patient is a 63 y.o.  Non- / non  male who presents with Shortness of Breath and Fall (Earlier in the day about 2pm)   and he is admitted to the hospital for the management of      Patient is 63-year-old male with past medical history of atrial fibrillation not on any anticoagulation due to history of liver cirrhosis and GI bleed, COPD, alcoholic liver cirrhosis s/p TIPS procedure hepatitis C electrolyte abnormality, history of esophageal cancer and remission, previous history of bowel perforation with extensive bowel surgery, and recently had ventral hernia repair and SBO presented to the ER after fall.  Patient states that he tripped and fell, patient not sure if he passed out or not,    Patient had pan CT, found to have T3 fracture, admitted under trauma, medicine consulted for medical management.  Breath    7/23/2024  Patient reports pain in the back to be stable  Is n.p.o. 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 Mc, orthopedic, every 3-4 months,  pending, echocardiogram pending. EKG reviewed   Liver cirrhosis secondary to alcohol use and hepatitis, s/p TIPS.  Small volume ascites noted on CT.  Ammonia levels within normal limits.  Continue lactulose  Alcohol use, monitor for Dts, reports last drink  several weeks back  Electrolyte disorders, replete.  Acute/subacute compression fracture at T3, plans for surgery by orthopedics.  EKG reviewed, within normal limits, may proceed with no further cardiac workup deemed necessary.  Will check INR  Replace electrolytes. Moderate risk  Thrombocytopenia, mild.  Okay to proceed with kyphoplasty    7/23/2024  Patient seen and examined  Orthostatics negative, echocardiogram still pending.  Hypomagnesemia, replete  Patient underwent open hernia ventral repair x 2 with mesh on 7/13/2024.  Today abdominal pad shows greenish discharge, no foul smell.  Incisions look clean with no concern for cellulitis around the incision site, could not express any drainage.  White count is normal.  Will have surgery and wound care take a look at the incision site.  Vitamin D deficiency, begin ergocalciferol  INR noted to be 1.6, patient going for kyphoplasty.  Should be okay to proceed if cleared by general surgery from abdominal perspective, Okay to proceed from IM perspective    7/24/2024  Patient seen and examined  Echocardiogram reviewed, EF 60-65%.  No wall motion abnormality.  Left ventricular hypertrophy/increased wall thickness noted.  Serous discharge, evaluated by general surgery.  No indication for antibiotics  Okay to proceed with surgery, moderate risk    7/26/2024  Patient is s/p kyphoplasty T3 7/24/2024  Doing well, states that pain is controlled,  Denied any chest pain shortness of breath  Been stable.  Acute on chronic normocytic normochromic anemia, no reported bleeding, hemoglobin 8.8 today, stays around 9, no active bleeding, will check CBC  TALA, continue pantoprazole, ferrous sulfate, will need outpatient abdominal

## 2024-07-27 NOTE — PROGRESS NOTES
General Surgery Progress Note    Subjective:     Patient without complaints. No nausea or vomiting. Voiding well.  Tolerating diet.  Having bowel movements    Scheduled Meds:   multivitamin  1 tablet Oral Daily    vitamin D  50,000 Units Oral Weekly    lactulose  20 g Oral Daily    magnesium oxide  400 mg Oral BID    [Held by provider] potassium chloride  10 mEq Oral BID    lidocaine  1 patch TransDERmal Daily    DULoxetine  30 mg Oral Daily    ferrous sulfate  325 mg Oral BID    folic acid  1 mg Oral Daily    metoprolol tartrate  12.5 mg Oral BID    pantoprazole  40 mg Oral BID    sodium chloride flush  5-40 mL IntraVENous 2 times per day     Continuous Infusions:   sodium chloride       PRN Meds:ondansetron, tiZANidine, sodium chloride flush, sodium chloride, potassium chloride **OR** potassium alternative oral replacement **OR** potassium chloride, magnesium sulfate, melatonin, polyethylene glycol, bisacodyl, acetaminophen **OR** acetaminophen, morphine, HYDROcodone 5 mg - acetaminophen **OR** HYDROcodone 5 mg - acetaminophen, sodium chloride flush    Objective:   BP (!) 151/72   Pulse 58   Temp 98.2 °F (36.8 °C)   Resp 16   Ht 1.753 m (5' 9\")   Wt 82 kg (180 lb 12.4 oz)   SpO2 95%   BMI 26.70 kg/m²     I/O last 3 completed shifts:  In: -   Out: 2000 [Urine:2000]    Chest: Breath sounds were clear and equal with no rales, wheezes, or rhonchi.     Cardiovascular: Heart sounds were normal with a regular rate and rhythm.  There were no murmurs or gallops.    Abdomen:  Bowel sounds were normal.  The abdomen was soft and non distended.  Incision is clean and packing with mild serous output/drainage consistent with seroma    LABS:  Lab Results   Component Value Date/Time    WBC 7.7 07/25/2024 05:58 AM    RBC 2.82 07/25/2024 05:58 AM    RBC 4.75 04/19/2012 11:30 AM    HGB 8.8 07/25/2024 05:58 AM    HCT 26.6 07/25/2024 05:58 AM    MCV 94.2 07/25/2024 05:58 AM    MCH 31.2 07/25/2024 05:58 AM    MCHC 33.1 07/25/2024

## 2024-07-27 NOTE — PROGRESS NOTES
Comprehensive Nutrition Assessment    Type and Reason for Visit:  Initial, RD Nutrition Re-Screen/LOS    Nutrition Recommendations/Plan:   Continue current diet.   Ensure Plus High Protein x 1 daily.     Malnutrition Assessment:  Malnutrition Status:  At risk for malnutrition (Comment) (07/27/24 1618)    Context:  Acute Illness     Findings of the 6 clinical characteristics of malnutrition:  Energy Intake:  Mild decrease in energy intake (Comment)  Weight Loss:  No significant weight loss     Body Fat Loss:  No significant body fat loss     Muscle Mass Loss:  No significant muscle mass loss    Fluid Accumulation:  No significant fluid accumulation     Strength:  Not Performed    Nutrition Assessment:    Pt fell and was found to have closed fracture of third thoracic vertebra, Hypomagnesemia and Hypokalemia. Kyphoplasty, T3 completed on 7/24. K and Mg now within normal limits. Pt states he is eating okay, but intake is dereased somewhat in the hospital. Willing to reciev one Ensure per day.    Nutrition Related Findings:    Labs and meds reviewed. Hx: liver cirrhosis, esophageal cancer (in remission), HTN, GERD, Hep C; open hernia ventral repair x 2 with mesh on 7/13/2024; additional extensive hx- refer to chart. BM 7/23. Wound Type: Multiple, Surgical Incision, Open Wounds       Current Nutrition Intake & Therapies:    Average Meal Intake: 51-75% (estimated)     ADULT DIET; Regular  ADULT ORAL NUTRITION SUPPLEMENT; Breakfast; Standard High Calorie/High Protein Oral Supplement    Anthropometric Measures:  Height: 175.3 cm (5' 9\")  Ideal Body Weight (IBW): 160 lbs (73 kg)    Admission Body Weight: 80.2 kg (176 lb 12.9 oz)  Current Body Weight: 82.3 kg (181 lb 7 oz), 113.4 % IBW. Weight Source: Bed Scale  Current BMI (kg/m2): 26.8                          BMI Categories: Overweight (BMI 25.0-29.9)    Estimated Daily Nutrient Needs:  Energy Requirements Based On: Formula  Weight Used for Energy Requirements:

## 2024-07-28 ENCOUNTER — APPOINTMENT (OUTPATIENT)
Dept: GENERAL RADIOLOGY | Age: 64
DRG: 479 | End: 2024-07-28
Attending: INTERNAL MEDICINE
Payer: COMMERCIAL

## 2024-07-28 PROCEDURE — 99232 SBSQ HOSP IP/OBS MODERATE 35: CPT | Performed by: INTERNAL MEDICINE

## 2024-07-28 PROCEDURE — 71101 X-RAY EXAM UNILAT RIBS/CHEST: CPT

## 2024-07-28 PROCEDURE — 1200000000 HC SEMI PRIVATE

## 2024-07-28 PROCEDURE — 6370000000 HC RX 637 (ALT 250 FOR IP): Performed by: ORTHOPAEDIC SURGERY

## 2024-07-28 RX ADMIN — Medication 1 TABLET: at 08:53

## 2024-07-28 RX ADMIN — Medication 400 MG: at 20:35

## 2024-07-28 RX ADMIN — PANTOPRAZOLE SODIUM 40 MG: 40 TABLET, DELAYED RELEASE ORAL at 08:54

## 2024-07-28 RX ADMIN — Medication 3 MG: at 23:24

## 2024-07-28 RX ADMIN — METOPROLOL TARTRATE 12.5 MG: 25 TABLET, FILM COATED ORAL at 20:35

## 2024-07-28 RX ADMIN — FERROUS SULFATE TAB 325 MG (65 MG ELEMENTAL FE) 325 MG: 325 (65 FE) TAB at 08:54

## 2024-07-28 RX ADMIN — HYDROCODONE BITARTRATE AND ACETAMINOPHEN 2 TABLET: 5; 325 TABLET ORAL at 18:14

## 2024-07-28 RX ADMIN — FERROUS SULFATE TAB 325 MG (65 MG ELEMENTAL FE) 325 MG: 325 (65 FE) TAB at 20:35

## 2024-07-28 RX ADMIN — HYDROCODONE BITARTRATE AND ACETAMINOPHEN 2 TABLET: 5; 325 TABLET ORAL at 02:21

## 2024-07-28 RX ADMIN — PANTOPRAZOLE SODIUM 40 MG: 40 TABLET, DELAYED RELEASE ORAL at 20:35

## 2024-07-28 RX ADMIN — HYDROCODONE BITARTRATE AND ACETAMINOPHEN 2 TABLET: 5; 325 TABLET ORAL at 08:55

## 2024-07-28 RX ADMIN — Medication 400 MG: at 08:53

## 2024-07-28 RX ADMIN — HYDROCODONE BITARTRATE AND ACETAMINOPHEN 2 TABLET: 5; 325 TABLET ORAL at 13:54

## 2024-07-28 RX ADMIN — FOLIC ACID 1 MG: 1 TABLET ORAL at 08:54

## 2024-07-28 RX ADMIN — DULOXETINE HYDROCHLORIDE 30 MG: 30 CAPSULE, DELAYED RELEASE ORAL at 08:53

## 2024-07-28 RX ADMIN — METOPROLOL TARTRATE 12.5 MG: 25 TABLET, FILM COATED ORAL at 08:53

## 2024-07-28 RX ADMIN — LACTULOSE 20 G: 20 SOLUTION ORAL at 08:57

## 2024-07-28 RX ADMIN — HYDROCODONE BITARTRATE AND ACETAMINOPHEN 2 TABLET: 5; 325 TABLET ORAL at 22:23

## 2024-07-28 ASSESSMENT — PAIN SCALES - GENERAL
PAINLEVEL_OUTOF10: 6
PAINLEVEL_OUTOF10: 2
PAINLEVEL_OUTOF10: 9
PAINLEVEL_OUTOF10: 9
PAINLEVEL_OUTOF10: 8
PAINLEVEL_OUTOF10: 0
PAINLEVEL_OUTOF10: 2
PAINLEVEL_OUTOF10: 2
PAINLEVEL_OUTOF10: 8
PAINLEVEL_OUTOF10: 0
PAINLEVEL_OUTOF10: 8
PAINLEVEL_OUTOF10: 3
PAINLEVEL_OUTOF10: 7

## 2024-07-28 ASSESSMENT — PAIN DESCRIPTION - ORIENTATION
ORIENTATION: RIGHT

## 2024-07-28 ASSESSMENT — PAIN - FUNCTIONAL ASSESSMENT
PAIN_FUNCTIONAL_ASSESSMENT: ACTIVITIES ARE NOT PREVENTED
PAIN_FUNCTIONAL_ASSESSMENT: ACTIVITIES ARE NOT PREVENTED
PAIN_FUNCTIONAL_ASSESSMENT: PREVENTS OR INTERFERES SOME ACTIVE ACTIVITIES AND ADLS
PAIN_FUNCTIONAL_ASSESSMENT: ACTIVITIES ARE NOT PREVENTED
PAIN_FUNCTIONAL_ASSESSMENT: PREVENTS OR INTERFERES SOME ACTIVE ACTIVITIES AND ADLS

## 2024-07-28 ASSESSMENT — PAIN DESCRIPTION - DESCRIPTORS
DESCRIPTORS: SHARP
DESCRIPTORS: SHARP
DESCRIPTORS: JABBING;SHARP
DESCRIPTORS: ACHING;SORE
DESCRIPTORS: ACHING;SORE

## 2024-07-28 ASSESSMENT — PAIN SCALES - WONG BAKER
WONGBAKER_NUMERICALRESPONSE: HURTS A LITTLE BIT
WONGBAKER_NUMERICALRESPONSE: HURTS A LITTLE BIT

## 2024-07-28 ASSESSMENT — PAIN DESCRIPTION - LOCATION
LOCATION: RIB CAGE
LOCATION: FLANK
LOCATION: RIB CAGE

## 2024-07-28 NOTE — PLAN OF CARE
Problem: Nutrition Deficit:  Goal: Optimize nutritional status  7/28/2024 0303 by Michelle Odonnell, RN  Outcome: Progressing  Pt tolerates po intake.     Problem: Chronic Conditions and Co-morbidities  Goal: Patient's chronic conditions and co-morbidity symptoms are monitored and maintained or improved  Outcome: Progressing  Flowsheets (Taken 7/27/2024 1957)  Care Plan - Patient's Chronic Conditions and Co-Morbidity Symptoms are Monitored and Maintained or Improved:   Monitor and assess patient's chronic conditions and comorbid symptoms for stability, deterioration, or improvement   Collaborate with multidisciplinary team to address chronic and comorbid conditions and prevent exacerbation or deterioration   Update acute care plan with appropriate goals if chronic or comorbid symptoms are exacerbated and prevent overall improvement and discharge     Problem: Skin/Tissue Integrity - Adult  Goal: Skin integrity remains intact  Outcome: Progressing  Flowsheets (Taken 7/27/2024 1957)  Skin Integrity Remains Intact: Monitor for areas of redness and/or skin breakdown  Goal: Incisions, wounds, or drain sites healing without S/S of infection  Outcome: Progressing     Problem: Musculoskeletal - Adult  Goal: Return mobility to safest level of function  Outcome: Progressing  Goal: Maintain proper alignment of affected body part  Outcome: Progressing  Goal: Return ADL status to a safe level of function  Outcome: Progressing

## 2024-07-28 NOTE — PLAN OF CARE
Problem: Chronic Conditions and Co-morbidities  Goal: Patient's chronic conditions and co-morbidity symptoms are monitored and maintained or improved  Outcome: Progressing  Flowsheets (Taken 7/28/2024 0730)  Care Plan - Patient's Chronic Conditions and Co-Morbidity Symptoms are Monitored and Maintained or Improved: Monitor and assess patient's chronic conditions and comorbid symptoms for stability, deterioration, or improvement     Problem: Skin/Tissue Integrity - Adult  Goal: Skin integrity remains intact  Outcome: Progressing  Flowsheets (Taken 7/28/2024 0730)  Skin Integrity Remains Intact: Monitor for areas of redness and/or skin breakdown     Problem: Skin/Tissue Integrity - Adult  Goal: Incisions, wounds, or drain sites healing without S/S of infection  Outcome: Progressing  Flowsheets (Taken 7/28/2024 0730)  Incisions, Wounds, or Drain Sites Healing Without Sign and Symptoms of Infection: TWICE DAILY: Assess and document skin integrity     Problem: Musculoskeletal - Adult  Goal: Return mobility to safest level of function  Outcome: Progressing

## 2024-07-28 NOTE — PROGRESS NOTES
General Surgery Progress Note    Subjective:     Patient without complaints. No nausea or vomiting. Voiding well.  Patient with poor oral intake and noted to have prealbumin of 3.5 consistent with severe protein calorie malnutrition    Scheduled Meds:   multivitamin  1 tablet Oral Daily    vitamin D  50,000 Units Oral Weekly    lactulose  20 g Oral Daily    magnesium oxide  400 mg Oral BID    [Held by provider] potassium chloride  10 mEq Oral BID    lidocaine  1 patch TransDERmal Daily    DULoxetine  30 mg Oral Daily    ferrous sulfate  325 mg Oral BID    folic acid  1 mg Oral Daily    metoprolol tartrate  12.5 mg Oral BID    pantoprazole  40 mg Oral BID    sodium chloride flush  5-40 mL IntraVENous 2 times per day     Continuous Infusions:   sodium chloride       PRN Meds:ondansetron, tiZANidine, sodium chloride flush, sodium chloride, potassium chloride **OR** potassium alternative oral replacement **OR** potassium chloride, magnesium sulfate, melatonin, polyethylene glycol, bisacodyl, acetaminophen **OR** acetaminophen, morphine, HYDROcodone 5 mg - acetaminophen **OR** HYDROcodone 5 mg - acetaminophen, sodium chloride flush    Objective:   /66   Pulse 62   Temp 97.9 °F (36.6 °C) (Oral)   Resp 18   Ht 1.753 m (5' 9\")   Wt 82.3 kg (181 lb 7 oz)   SpO2 94%   BMI 26.79 kg/m²     I/O last 3 completed shifts:  In: -   Out: 1150 [Urine:1150]    Chest: Breath sounds were clear and equal with no rales, wheezes, or rhonchi.      Cardiovascular: Heart sounds were normal with a regular rate and rhythm.      Abdomen:  Bowel sounds were normal.  The abdomen was soft and non distended.  Wound is clean with minimal serous output    LABS:  Lab Results   Component Value Date/Time    WBC 7.7 07/25/2024 05:58 AM    RBC 2.82 07/25/2024 05:58 AM    RBC 4.75 04/19/2012 11:30 AM    HGB 8.8 07/25/2024 05:58 AM    HCT 26.6 07/25/2024 05:58 AM    MCV 94.2 07/25/2024 05:58 AM    MCH 31.2 07/25/2024 05:58 AM    MCHC 33.1  07/25/2024 05:58 AM    RDW 14.4 07/25/2024 05:58 AM     07/25/2024 05:58 AM     04/19/2012 11:30 AM    MPV 8.1 07/25/2024 05:58 AM       Lab Results   Component Value Date/Time     07/24/2024 06:29 AM    K 4.7 07/24/2024 06:29 AM     07/24/2024 06:29 AM    CO2 25 07/24/2024 06:29 AM    BUN 6 07/24/2024 06:29 AM    CREATININE 0.9 07/24/2024 06:29 AM    GLUCOSE 103 07/24/2024 06:29 AM    GLUCOSE 71 04/21/2023 04:18 AM    CALCIUM 8.2 07/24/2024 06:29 AM       Assessment/Plan:   Status post incisional herniorrhaphy with mesh and kyphoplasty for T3 wedge fracture  Severe protein calorie malnutrition with albumin of 3.5.  Underlying cirrhosis and COPD  Continue local wound care and encourage oral intake and ambulate as tolerated      Electronically signed by Kvng Waddell DO  on 7/28/2024 at 7:34 AM

## 2024-07-28 NOTE — PROGRESS NOTES
[facility]   IN-PATIENT SERVICE   [facility]    HISTORY AND PHYSICAL EXAMINATION            Date:   7/28/2024  Patient name:  Frank Lisa  Date of admission:  7/20/2024  8:18 PM  MRN:   [id number]  Account:  [id number]  YOB: 1960  PCP:    Lopez Rosario PA  Room:   [id number]  Code Status:    Full Code    Chief Complaint:     Chief Complaint   Patient presents with    Shortness of Breath    Fall     Earlier in the day about 2pm       History Obtained From:     patient    History of Present Illness:     The patient is a 63 y.o.  Non- / non  male who presents with Shortness of Breath and Fall (Earlier in the day about 2pm)   and he is admitted to the hospital for the management of      Patient is 63-year-old male with past medical history of atrial fibrillation not on any anticoagulation due to history of liver cirrhosis and GI bleed, COPD, alcoholic liver cirrhosis s/p TIPS procedure hepatitis C electrolyte abnormality, history of esophageal cancer and remission, previous history of bowel perforation with extensive bowel surgery, and recently had ventral hernia repair and SBO presented to the ER after fall.  Patient states that he tripped and fell, patient not sure if he passed out or not,    Patient had pan CT, found to have T3 fracture, admitted under trauma, medicine consulted for medical management.  Breath    7/23/2024  Patient reports pain in the back to be stable  Is n.p.o. 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 Mc, orthopedic, every 3-4 months,  hour(s)).        Imaging/Diagnostics:    FLUORO FOR SURGICAL PROCEDURES  Radiology exam is complete. No Radiologist dictation. Please follow up   with ordering provider.        Assessment :      Primary Problem  Closed fracture of third thoracic vertebra (HCC)    Active Hospital Problems    Diagnosis Date Noted    Age-related osteoporosis with current pathological fracture with delayed healing [M80.00XG] 07/24/2024    Acute midline thoracic back pain [M54.6] 07/24/2024    Closed fracture of third thoracic vertebra (HCC) [S22.039A] 07/20/2024       Plan:     Patient status Admit as inpatient in the  Progressive Unit/Step down    Patient is 63-year-old male with multiple comorbidities presented to the hospital with fall and questionable syncopal episode, found to have likely T3 fracture admitted under trauma, medicine team has been consulted for medical management patient to get MRI   Presyncope/syncopal episode, patient positive for alcohol, could be secondary to intoxication, repeat orthostats pending s.  Echocardiogram pending have history of A-fib currently in sinus, head CT was done nonacute  Liver cirrhosis secondary to alcohol use and hepatitis s/p TIPS, ammonia was checked normal, on lactulose  Alcohol abuse, watch for withdrawal patient was having visible tremors, but otherwise oriented, stated that he always had direct ammonia was checked no next  Anemia of chronic disease hemoglobin dropped to 8.8 likely secondary to leg trauma, pan CT reviewed  Recently had SBO and ventral hernia repair, abdominal CT done nonacute  RCRI 0, but due to his history of liver disease s/p TIPS GI bleed, patient is at moderate to high risk for any surgical procedure.,  INR is 1.5, platelet count stable  Hypokalemia and hypomagnesemia will replace, likely have led to the presyncopal/syncopal episode  Esophageal cancer    7/22/2024  Presyncope, likely secondary to alcohol use.  Orthostats pending, echocardiogram pending. EKG

## 2024-07-29 VITALS
SYSTOLIC BLOOD PRESSURE: 118 MMHG | OXYGEN SATURATION: 96 % | HEIGHT: 69 IN | HEART RATE: 65 BPM | WEIGHT: 181.44 LBS | BODY MASS INDEX: 26.87 KG/M2 | TEMPERATURE: 98.1 F | RESPIRATION RATE: 20 BRPM | DIASTOLIC BLOOD PRESSURE: 53 MMHG

## 2024-07-29 PROCEDURE — 6370000000 HC RX 637 (ALT 250 FOR IP): Performed by: ORTHOPAEDIC SURGERY

## 2024-07-29 PROCEDURE — 99232 SBSQ HOSP IP/OBS MODERATE 35: CPT | Performed by: INTERNAL MEDICINE

## 2024-07-29 RX ADMIN — PANTOPRAZOLE SODIUM 40 MG: 40 TABLET, DELAYED RELEASE ORAL at 09:43

## 2024-07-29 RX ADMIN — LACTULOSE 20 G: 20 SOLUTION ORAL at 09:42

## 2024-07-29 RX ADMIN — FOLIC ACID 1 MG: 1 TABLET ORAL at 09:43

## 2024-07-29 RX ADMIN — FERROUS SULFATE TAB 325 MG (65 MG ELEMENTAL FE) 325 MG: 325 (65 FE) TAB at 09:43

## 2024-07-29 RX ADMIN — METOPROLOL TARTRATE 12.5 MG: 25 TABLET, FILM COATED ORAL at 09:42

## 2024-07-29 RX ADMIN — Medication 1 TABLET: at 09:42

## 2024-07-29 RX ADMIN — Medication 400 MG: at 09:42

## 2024-07-29 RX ADMIN — HYDROCODONE BITARTRATE AND ACETAMINOPHEN 2 TABLET: 5; 325 TABLET ORAL at 04:58

## 2024-07-29 RX ADMIN — DULOXETINE HYDROCHLORIDE 30 MG: 30 CAPSULE, DELAYED RELEASE ORAL at 09:42

## 2024-07-29 ASSESSMENT — PAIN SCALES - GENERAL
PAINLEVEL_OUTOF10: 4
PAINLEVEL_OUTOF10: 8
PAINLEVEL_OUTOF10: 8

## 2024-07-29 ASSESSMENT — PAIN - FUNCTIONAL ASSESSMENT: PAIN_FUNCTIONAL_ASSESSMENT: ACTIVITIES ARE NOT PREVENTED

## 2024-07-29 ASSESSMENT — PAIN DESCRIPTION - DESCRIPTORS: DESCRIPTORS: SORE

## 2024-07-29 ASSESSMENT — PAIN DESCRIPTION - ORIENTATION: ORIENTATION: RIGHT

## 2024-07-29 ASSESSMENT — PAIN SCALES - WONG BAKER: WONGBAKER_NUMERICALRESPONSE: HURTS A LITTLE BIT

## 2024-07-29 ASSESSMENT — PAIN DESCRIPTION - LOCATION: LOCATION: RIB CAGE

## 2024-07-29 NOTE — CARE COORDINATION
DISCHARGE PLANNING NOTE:    LSW following for discharge to SNF. Patient was accepted to [facility]. Authorization submitted on 7/25. Still pending at this time per Kenyetta in admissions.

## 2024-07-29 NOTE — CARE COORDINATION
DISCHARGE PLANNING NOTE:    Call from Kenyetta in admissions at [facility]. Authorization approved. Patient is ok to admit to facility tonight.     Transportation arranged for patient to go to [facility] at  via [facility]. Facility informed. Bedside nurse informed.     Number for Report: [phone]

## 2024-07-29 NOTE — PROGRESS NOTES
DATE: 2024    NAME: Frank Lisa  MRN: [id number]   : 1960    Patient not seen this date for Physical Therapy due to:      [] Cancel by RN or physician due to:    [] Hemodialysis    [] Critical Lab Value Level     [] Blood transfusion in progress    [] Acute or unstable cardiovascular status   _MAP < 55 or more than >115  _HR < 40 or > 130    [] Acute or unstable pulmonary status   -FiO2 > 60%   _RR < 5 or >40    _O2 sats < 85%    [] Strict Bedrest    [] Off Unit for surgery or procedure    [] Off Unit for testing       [] Pending imaging to R/O fracture    [x] Refusal by Patient :Pt declined d/t pain, ribs, X-ray shows minimally displaced fracture involving the anterolateral aspect of the right 8th rib. Will continue to pursue at a later date.     [] Other      [] PT being discontinued at this time. Patient independent. No further needs.     [] PT being discontinued at this time as the patient has been transferred to hospice care. No further needs.      Jocelyne Cornejo, PTA

## 2024-07-29 NOTE — PROGRESS NOTES
General Surgery Progress Note    Subjective:     Patient without complaints. No nausea or vomiting. Voiding well.  Complaining of some right chest wall tenderness after fall with apparent rib fracture.  Tolerating dinner but poor oral intake on other 2 meals    Scheduled Meds:   multivitamin  1 tablet Oral Daily    vitamin D  50,000 Units Oral Weekly    lactulose  20 g Oral Daily    magnesium oxide  400 mg Oral BID    [Held by provider] potassium chloride  10 mEq Oral BID    lidocaine  1 patch TransDERmal Daily    DULoxetine  30 mg Oral Daily    ferrous sulfate  325 mg Oral BID    folic acid  1 mg Oral Daily    metoprolol tartrate  12.5 mg Oral BID    pantoprazole  40 mg Oral BID    sodium chloride flush  5-40 mL IntraVENous 2 times per day     Continuous Infusions:   sodium chloride       PRN Meds:ondansetron, tiZANidine, sodium chloride flush, sodium chloride, potassium chloride **OR** potassium alternative oral replacement **OR** potassium chloride, magnesium sulfate, melatonin, polyethylene glycol, bisacodyl, acetaminophen **OR** acetaminophen, morphine, HYDROcodone 5 mg - acetaminophen **OR** HYDROcodone 5 mg - acetaminophen, sodium chloride flush    Objective:   BP (!) 118/53   Pulse 65   Temp 98.1 °F (36.7 °C)   Resp 20   Ht 1.753 m (5' 9\")   Wt 82.3 kg (181 lb 7 oz)   SpO2 96%   BMI 26.79 kg/m²     I/O last 3 completed shifts:  In: -   Out: 350 [Urine:350]    Chest: Breath sounds were clear and equal with no rales, wheezes, or rhonchi.  Respiratory effort was normal     Cardiovascular: Heart sounds were normal with a regular rate and rhythm.  T    Abdomen:  Bowel sounds were normal.  The abdomen was soft and non distended.  There was no tenderness, guarding, rebound, or rigidity.  Wound is clean with minimal serous drainage    LABS:  Lab Results   Component Value Date/Time    WBC 7.7 07/25/2024 05:58 AM    RBC 2.82 07/25/2024 05:58 AM    RBC 4.75 04/19/2012 11:30 AM    HGB 8.8 07/25/2024 05:58 AM

## 2024-07-29 NOTE — ADT AUTH CERT
Progress Notes by Jessika Abad MD at 7/28/2024  2:03 PM    Author: Jessika Abad MD Service: Internal Medicine Author Type: Physician   Filed: 7/28/2024  2:06 PM Date of Service: 7/28/2024  2:03 PM Status: Signed   : Jessika Abad MD (Physician)   Expand All Collapse All        [facility]   IN-PATIENT SERVICE   [facility]     HISTORY AND PHYSICAL EXAMINATION            Date:                            7/28/2024  Patient name:              Frank Lisa  Date of admission:      7/20/2024  8:18 PM  MRN:                           [id number]  Account:                      [id number]  YOB: 1960  PCP:                            Lopez Rosario PA  Room:                          2044/2044-01  Code Status:               Full Code     Chief Complaint:           Chief Complaint   Patient presents with    Shortness of Breath    Fall       Earlier in the day about 2pm         History Obtained From:      patient     History of Present Illness:      The patient is a 63 y.o.  Non- / non  male who presents with Shortness of Breath and Fall (Earlier in the day about 2pm)   and he is admitted to the hospital for the management of       Patient is 63-year-old male with past medical history of atrial fibrillation not on any anticoagulation due to history of liver cirrhosis and GI bleed, COPD, alcoholic liver cirrhosis s/p TIPS procedure hepatitis C electrolyte abnormality, history of esophageal cancer and remission, previous history of bowel perforation with extensive bowel surgery, and recently had ventral hernia repair and SBO presented to the ER after fall.  Patient states that he tripped and fell, patient not sure if he passed out or not,     Patient had pan CT, found to have T3 fracture, admitted under trauma, medicine consulted for medical management.  Breath    7/23/2024  Patient reports pain in the back to be stable  Is n.p.o. for planned procedure  COLECTOMY, CREATION IF END ILEOSTOMY performed by Kvng Waddell DO at [facility] OR    NASAL SEPTUM SURGERY        NASOTRACHEAL SUCTIONING   10/18/2023    NERVE BLOCK Right 11/23/2020     NERVE BLOCK RIGHT CERVICAL STEROID INJECTION  C3-C6 performed by Dheeraj Lau MD at [facility] OR    OTHER SURGICAL HISTORY   01/04/2016     steroid injection C7 T1    OTHER SURGICAL HISTORY   11/21/2016     Bilateral Lumbar CACHORRO L4-L5 injections    OTHER SURGICAL HISTORY   12/19/2016     lumbar steroid injection    OTHER SURGICAL HISTORY   09/28/2018     BILATERAL L5 CACHORRO (N/A Back)    OTHER SURGICAL HISTORY Right 11/23/2020     cervical injection    PAIN MANAGEMENT PROCEDURE Left 07/09/2020     EPIDURAL STEROID INJECTION LEFT L4 L5 performed by Dheeraj Lau MD at [facility] OR    PAIN MANAGEMENT PROCEDURE Left 07/20/2020     LEFT L4 L5 EPIDURAL STEROID INJECTION performed by Dheeraj Lau MD at [facility] OR    PAIN MANAGEMENT PROCEDURE Bilateral 08/17/2020     LUMBAR FACET BILATERAL L2-L5 performed by Dheeraj Lau MD at [facility] OR    PAIN MANAGEMENT PROCEDURE Bilateral 12/07/2020     NERVE BLOCK BILATERAL LUMBAR MEDIAL BRANCH L2-L5 performed by Dheeraj Lau MD at [facility] OR    PARACENTESIS         Multiple    RI NEUROPLASTY &/TRANSPOS MEDIAN NRV CARPAL TUNNE Right 08/29/2017     CARPAL TUNNEL RELEASE RIGHT performed by Curtis Villalobos MD at [facility] OR    RI NEUROPLASTY &/TRANSPOS MEDIAN NRV CARPAL TUNNE Left 10/31/2017     CARPAL TUNNEL RELEASE performed by Curtis Villalobos MD at [facility] OR    RI NJX AA&/STRD TFRML EPI LUMBAR/SACRAL 1 LEVEL Bilateral 09/06/2018     BILATERAL L5 CACHORRO performed by Dheeraj Lau MD at [facility] OR    RI NJX AA&/STRD TFRML EPI LUMBAR/SACRAL 1 LEVEL N/A 09/28/2018     BILATERAL L5 CACHORRO performed by Dheeraj Lau MD at [facility] OR    SMALL INTESTINE SURGERY N/A 10/10/2023     EXPLORATORY LAPAROTOMY, REVERSAL ILEOSTOMY WITH ILEOCOLOSTOMY, LYSIS OF ADHESIONS, performed by Kvng Waddell DO at [facility] OR    SPINE SURGERY N/A 7/24/2024

## 2024-07-29 NOTE — PROGRESS NOTES
Occupational Therapy  [facility]  Occupational Therapy Not Seen    DATE: 2024    NAME: rFank Lisa  MRN: [id number]   : 1960    Patient not seen this date for Occupational Therapy due to:      [] Cancel by RN or physician due to:    [] Hemodialysis    [] Critical Lab Value Level     [] Blood transfusion in progress    [] Acute or unstable cardiovascular status   _MAP < 55 or more than >115  _HR < 40 or > 130    [] Acute or unstable pulmonary status   -FiO2 > 60%   _RR < 5 or >40    _O2 sats < 85%    [] Strict Bedrest    [] Off Unit for surgery or procedure    [] Off Unit for testing       [] Pending imaging to R/O fracture    [x] Refusal by Patient (Pt declined d/t pain, ribs, X-ray shows minimally displaced fracture involving the anterolateral aspect of the right 8th rib. Will continue to pursue at a later date).         [] Other      [] OT being discontinued at this time. Patient independent. No further needs.     [] OT being discontinued at this time as the patient has been transferred to hospice care. No further needs.      TASHIA ANN

## 2024-07-29 NOTE — PLAN OF CARE
Problem: Nutrition Deficit:  Goal: Optimize nutritional status  7/29/2024 1048 by Toya Rust RN  Outcome: Progressing  7/29/2024 0554 by Michelle Odonnell RN  Outcome: Progressing     Problem: Chronic Conditions and Co-morbidities  Goal: Patient's chronic conditions and co-morbidity symptoms are monitored and maintained or improved  7/29/2024 1048 by Toya Rust RN  Outcome: Progressing  7/29/2024 0554 by Michelle Odonnell RN  Outcome: Progressing  Flowsheets (Taken 7/28/2024 2037)  Care Plan - Patient's Chronic Conditions and Co-Morbidity Symptoms are Monitored and Maintained or Improved:   Monitor and assess patient's chronic conditions and comorbid symptoms for stability, deterioration, or improvement   Collaborate with multidisciplinary team to address chronic and comorbid conditions and prevent exacerbation or deterioration   Update acute care plan with appropriate goals if chronic or comorbid symptoms are exacerbated and prevent overall improvement and discharge     Problem: Skin/Tissue Integrity - Adult  Goal: Skin integrity remains intact  7/29/2024 1048 by Toya Rust RN  Outcome: Progressing  7/29/2024 0554 by Michelle Odonnell RN  Outcome: Progressing  Flowsheets (Taken 7/28/2024 2037)  Skin Integrity Remains Intact: Monitor for areas of redness and/or skin breakdown  Goal: Incisions, wounds, or drain sites healing without S/S of infection  7/29/2024 1048 by Toya Rust RN  Outcome: Progressing  7/29/2024 0554 by Michelle Odonnell RN  Outcome: Progressing  Flowsheets (Taken 7/28/2024 2037)  Incisions, Wounds, or Drain Sites Healing Without Sign and Symptoms of Infection:   TWICE DAILY: Assess and document skin integrity   TWICE DAILY: Assess and document dressing/incision, wound bed, drain sites and surrounding tissue   Implement wound care per orders   ADMISSION and DAILY: Assess and document risk factors for pressure ulcer development

## 2024-07-29 NOTE — PROGRESS NOTES
Writer messaged the doctor to come to bedside to evaluate the patient's left eye. Writer noticed that his sclera was red/bloody and it appears to have a blister as well.  Doctor Karan came to beside and evaluated and said that he needs to be seen by ophthalmology. I advised the SNF ([facility]) about his for his admission to their facility

## 2024-07-29 NOTE — PLAN OF CARE
Problem: Nutrition Deficit:  Goal: Optimize nutritional status  7/29/2024 0554 by Michelle Odonnell RN  Outcome: Progressing  7/28/2024 1740 by Kishore Olson RN  Outcome: Progressing     Problem: Chronic Conditions and Co-morbidities  Goal: Patient's chronic conditions and co-morbidity symptoms are monitored and maintained or improved  7/29/2024 0554 by Michelle Odonnell RN  Outcome: Progressing  Flowsheets (Taken 7/28/2024 2037)  Care Plan - Patient's Chronic Conditions and Co-Morbidity Symptoms are Monitored and Maintained or Improved:   Monitor and assess patient's chronic conditions and comorbid symptoms for stability, deterioration, or improvement   Collaborate with multidisciplinary team to address chronic and comorbid conditions and prevent exacerbation or deterioration   Update acute care plan with appropriate goals if chronic or comorbid symptoms are exacerbated and prevent overall improvement and discharge       Problem: Skin/Tissue Integrity - Adult  Goal: Skin integrity remains intact  7/29/2024 0554 by Michelle Odonnell RN  Outcome: Progressing      Monitor for areas of redness and/or skin breakdown  Goal: Incisions, wounds, or drain sites healing without S/S of infection  7/29/2024 0554 by Michelle Odonnell RN  Outcome: Progressing  Flowsheets (Taken 7/28/2024 2037)  Incisions, Wounds, or Drain Sites Healing Without Sign and Symptoms of Infection:   TWICE DAILY: Assess and document skin integrity   TWICE DAILY: Assess and document dressing/incision, wound bed, drain sites and surrounding tissue   Implement wound care per orders   ADMISSION and DAILY: Assess and document risk factors for pressure ulcer development  Flowsheets (Taken 7/28/2024 0730)  Incisions, Wounds, or Drain Sites Healing Without Sign and Symptoms of Infection: TWICE DAILY: Assess and document skin integrity     Problem: Musculoskeletal - Adult  Goal: Return mobility to safest level of function  7/29/2024 0554 by Michelle Odonnell,

## 2024-07-29 NOTE — PROGRESS NOTES
Patient has been discharged to [facility]. Report was called to Milana and all questions were answered to her satisfaction. Patient was transported by ambulance to the SNF.

## 2024-07-29 NOTE — PROGRESS NOTES
[facility]   IN-PATIENT SERVICE   [facility]    HISTORY AND PHYSICAL EXAMINATION            Date:   7/29/2024  Patient name:  Frank Lisa  Date of admission:  7/20/2024  8:18 PM  MRN:   [id number]  Account:  [id number]  YOB: 1960  PCP:    Lopez Rosario PA  Room:   [id number]  Code Status:    Full Code    Chief Complaint:     Chief Complaint   Patient presents with    Shortness of Breath    Fall     Earlier in the day about 2pm       History Obtained From:     patient    History of Present Illness:     The patient is a 63 y.o.  Non- / non  male who presents with Shortness of Breath and Fall (Earlier in the day about 2pm)   and he is admitted to the hospital for the management of      Patient is 63-year-old male with past medical history of atrial fibrillation not on any anticoagulation due to history of liver cirrhosis and GI bleed, COPD, alcoholic liver cirrhosis s/p TIPS procedure hepatitis C electrolyte abnormality, history of esophageal cancer and remission, previous history of bowel perforation with extensive bowel surgery, and recently had ventral hernia repair and SBO presented to the ER after fall.  Patient states that he tripped and fell, patient not sure if he passed out or not,    Patient had pan CT, found to have T3 fracture, admitted under trauma, medicine consulted for medical management.  Breath    7/23/2024  Patient reports pain in the back to be stable  Is n.p.o. 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 Mc, orthopedic, every 3-4 months,  ammonia was checked no next  Anemia of chronic disease hemoglobin dropped to 8.8 likely secondary to leg trauma, pan CT reviewed  Recently had SBO and ventral hernia repair, abdominal CT done nonacute  RCRI 0, but due to his history of liver disease s/p TIPS GI bleed, patient is at moderate to high risk for any surgical procedure.,  INR is 1.5, platelet count stable  Hypokalemia and hypomagnesemia will replace, likely have led to the presyncopal/syncopal episode  Esophageal cancer    7/22/2024  Presyncope, likely secondary to alcohol use.  Orthostats pending, echocardiogram pending. EKG reviewed   Liver cirrhosis secondary to alcohol use and hepatitis, s/p TIPS.  Small volume ascites noted on CT.  Ammonia levels within normal limits.  Continue lactulose  Alcohol use, monitor for Dts, reports last drink  several weeks back  Electrolyte disorders, replete.  Acute/subacute compression fracture at T3, plans for surgery by orthopedics.  EKG reviewed, within normal limits, may proceed with no further cardiac workup deemed necessary.  Will check INR  Replace electrolytes. Moderate risk  Thrombocytopenia, mild.  Okay to proceed with kyphoplasty    7/23/2024  Patient seen and examined  Orthostatics negative, echocardiogram still pending.  Hypomagnesemia, replete  Patient underwent open hernia ventral repair x 2 with mesh on 7/13/2024.  Today abdominal pad shows greenish discharge, no foul smell.  Incisions look clean with no concern for cellulitis around the incision site, could not express any drainage.  White count is normal.  Will have surgery and wound care take a look at the incision site.  Vitamin D deficiency, begin ergocalciferol  INR noted to be 1.6, patient going for kyphoplasty.  Should be okay to proceed if cleared by general surgery from abdominal perspective, Okay to proceed from IM perspective    7/24/2024  Patient seen and examined  Echocardiogram reviewed, EF 60-65%.  No wall motion abnormality.  Left

## 2024-07-30 ENCOUNTER — CARE COORDINATION (OUTPATIENT)
Dept: CARE COORDINATION | Age: 64
End: 2024-07-30

## 2024-07-30 NOTE — CARE COORDINATION
Chart notes reviewed, patient sent to SNF after IP stay.     Call to [facility], spoke with Nellie, confirmed patient admission.

## 2024-07-31 ENCOUNTER — HOSPITAL ENCOUNTER (OUTPATIENT)
Age: 64
Setting detail: SPECIMEN
Discharge: HOME | End: 2024-07-31

## 2024-07-31 LAB
ALBUMIN SERPL-MCNC: 2.1 G/DL (ref 3.5–5.2)
ALP SERPL-CCNC: 129 U/L (ref 40–129)
ALT SERPL-CCNC: 10 U/L (ref 5–41)
AMMONIA PLAS-SCNC: 75 UMOL/L (ref 16–60)
ANION GAP SERPL CALCULATED.3IONS-SCNC: 10 MMOL/L (ref 9–17)
AST SERPL-CCNC: 31 U/L
BASOPHILS # BLD: 0.03 K/UL (ref 0–0.2)
BASOPHILS NFR BLD: 1 % (ref 0–2)
BILIRUB SERPL-MCNC: 1.2 MG/DL (ref 0.3–1.2)
BUN SERPL-MCNC: 8 MG/DL (ref 8–23)
BUN/CREAT SERPL: 8 (ref 9–20)
CALCIUM SERPL-MCNC: 8.2 MG/DL (ref 8.6–10.4)
CHLORIDE SERPL-SCNC: 105 MMOL/L (ref 98–107)
CO2 SERPL-SCNC: 22 MMOL/L (ref 20–31)
CREAT SERPL-MCNC: 1 MG/DL (ref 0.7–1.2)
EOSINOPHIL # BLD: 0.32 K/UL (ref 0–0.44)
EOSINOPHILS RELATIVE PERCENT: 5 % (ref 1–4)
ERYTHROCYTE [DISTWIDTH] IN BLOOD BY AUTOMATED COUNT: 14.6 % (ref 11.8–14.4)
GFR, ESTIMATED: 84 ML/MIN/1.73M2
GLUCOSE SERPL-MCNC: 153 MG/DL (ref 70–99)
HCT VFR BLD AUTO: 27.7 % (ref 40.7–50.3)
HGB BLD-MCNC: 8.8 G/DL (ref 13–17)
IMM GRANULOCYTES # BLD AUTO: 0.02 K/UL (ref 0–0.3)
IMM GRANULOCYTES NFR BLD: 0 %
LACTATE BLDV-SCNC: 2.2 MMOL/L (ref 0.5–2.2)
LYMPHOCYTES NFR BLD: 0.99 K/UL (ref 1.1–3.7)
LYMPHOCYTES RELATIVE PERCENT: 15 % (ref 24–43)
MCH RBC QN AUTO: 31.5 PG (ref 25.2–33.5)
MCHC RBC AUTO-ENTMCNC: 31.8 G/DL (ref 28.4–34.8)
MCV RBC AUTO: 99.3 FL (ref 82.6–102.9)
MONOCYTES NFR BLD: 1.11 K/UL (ref 0.1–1.2)
MONOCYTES NFR BLD: 17 % (ref 3–12)
NEUTROPHILS NFR BLD: 62 % (ref 36–65)
NEUTS SEG NFR BLD: 3.95 K/UL (ref 1.5–8.1)
NRBC BLD-RTO: 0 PER 100 WBC
PLATELET # BLD AUTO: 168 K/UL (ref 138–453)
PMV BLD AUTO: 10.2 FL (ref 8.1–13.5)
POTASSIUM SERPL-SCNC: 4.4 MMOL/L (ref 3.7–5.3)
PROT SERPL-MCNC: 4.9 G/DL (ref 6.4–8.3)
RBC # BLD AUTO: 2.79 M/UL (ref 4.21–5.77)
RBC # BLD: ABNORMAL 10*6/UL
SODIUM SERPL-SCNC: 137 MMOL/L (ref 135–144)
WBC OTHER # BLD: 6.4 K/UL (ref 3.5–11.3)

## 2024-07-31 PROCEDURE — 36415 COLL VENOUS BLD VENIPUNCTURE: CPT

## 2024-07-31 PROCEDURE — 85025 COMPLETE CBC W/AUTO DIFF WBC: CPT

## 2024-07-31 PROCEDURE — 82140 ASSAY OF AMMONIA: CPT

## 2024-07-31 PROCEDURE — 83605 ASSAY OF LACTIC ACID: CPT

## 2024-07-31 PROCEDURE — 80053 COMPREHEN METABOLIC PANEL: CPT

## 2024-07-31 PROCEDURE — P9603 ONE-WAY ALLOW PRORATED MILES: HCPCS

## 2024-08-01 NOTE — DISCHARGE SUMMARY
Discharge Summary    Attending Physician: No att. providers found  Admit Date: 7/20/2024  Discharge Date: 7/29/2024   Primary Care Physician: Lopez Rosario PA    Admitting Diagnosis:  Principal Problem:    Closed fracture of third thoracic vertebra (HCC)  Active Problems:    Age-related osteoporosis with current pathological fracture with delayed healing    Acute midline thoracic back pain  Resolved Problems:    * No resolved hospital problems. *        Discharge Diagnoses:  Principal Problem:    Closed fracture of third thoracic vertebra (HCC)  Active Problems:    Age-related osteoporosis with current pathological fracture with delayed healing    Acute midline thoracic back pain  Resolved Problems:    * No resolved hospital problems. 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 Mc, orthopedic, every 3-4 months, gets steroid injection    Lezama esophagus     Bleeding gastric varices 12/29/2022    Bowel perforation (HCC) 03/25/2023    BPH (benign prostatic hypertrophy)     Cholelithiasis     Cirrhosis (HCC)     COVID-19 12/2020    pt reports he had a positive test while at [facility] in 2020, was asymptomatic    COVID-19 vaccine series completed 5/20/2021, 6/22/2021    Moderna 5/20/2021, 6/22/2021    DDD (degenerative disc disease), lumbar     Depression     Esophageal adenocarcinoma (HCC)     Esophageal cancer (HCC)     INVASIVE ADENOCARCINOMA ARISING IN TUBULAR ADENOMA WITH HIGH GRADE DYSPLASIA, ASSOCIATED WITH FOCAL INTESTINAL METAPLASIA     Esophageal varices (HCC)     Fatty liver     GERD (gastroesophageal reflux disease)     GI bleed     Heart murmur   (2) cough or sneeze

## 2024-08-05 ENCOUNTER — TELEPHONE (OUTPATIENT)
Dept: PRIMARY CARE CLINIC | Age: 64
End: 2024-08-05

## 2024-08-05 NOTE — TELEPHONE ENCOUNTER
Care Transitions Initial Follow Up Call    Outreach made within 2 business days of discharge: Yes    Patient: Frank Lisa Patient : 1960   MRN: [id number]  Reason for Admission: back surgery   Discharge Date: 24       Spoke with: Maria L Demarco    Discharge department/facility: [facility] Interactive Patient Contact:  Was patient able to fill all prescriptions: No: the do not know  Was patient instructed to bring all medications to the follow-up visit: Yes  Is patient taking all medications as directed in the discharge summary? Yes  Does patient understand their discharge instructions: Yes  Does patient have questions or concerns that need addressed prior to 7-14 day follow up office visit: no    Additional needs identified to be addressed with provider  No needs identified             Scheduled appointment with PCP within 7-14 days    Follow Up  Future Appointments   Date Time Provider Department Center   2025  1:40 PM Edinson Griffith MD [facility]       LISET RIVERA MA

## 2024-08-05 NOTE — PROGRESS NOTES
Physician Progress Note      PATIENT:               ALHAJI COOLEY  CSN #:                  [id number]  :                       1960  ADMIT DATE:       2024 8:18 PM  DISCH DATE:        2024 5:06 PM  RESPONDING  PROVIDER #:        Kvng Waddell MD          QUERY TEXT:    Patient admitted with closed fracture of third thoracic vertebra. Noted   documentation of severe protein calorie malnutrition in general surgery   progress note on 24. In order to support the diagnosis of severe protein   calorie malnutrition, please include additional clinical indicators in your   documentation.  Or please document if the diagnosis of severe protein calorie   malnutrition has been ruled out after further study.    The medical record reflects the following:  Risk Factors: Cirrhosis, Alcohol abuse, esophageal cancer (in remission),   GERD, Hep C  Clinical Indicators: per  general surgery progress note \" Severe protein   calorie malnutrition with albumin of 3.5\", Per Registered Dietitian note on   24 \"  At risk for malnutrition, Energy Intake:  Mild decrease in energy   intake\" BMI 26.79  Treatment: Comprehensive Nutrition Assessment by Registered Dietitian, Ensure   Plus High Protein once a day. lab monitoring      [email or web address]  Options provided:  -- severe protein calorie malnutrition present as evidenced by, Please   document evidence.  -- severe protein calorie malnutrition was ruled out  -- Other - I will add my own diagnosis  -- Disagree - Not applicable / Not valid  -- Disagree - Clinically unable to determine / Unknown  -- Refer to Clinical Documentation Reviewer    PROVIDER RESPONSE TEXT:    severe protein calorie malnutrition is present as evidenced by low prealbumin    Query created by: MURIEL LATIF on 2024 11:31 AM      Electronically signed by:  Kvng Waddell MD 2024 11:48 AM

## 2024-08-06 DIAGNOSIS — E87.6 HYPOKALEMIA: ICD-10-CM

## 2024-08-06 DIAGNOSIS — E83.42 HYPOMAGNESEMIA: ICD-10-CM

## 2024-08-06 RX ORDER — MAGNESIUM OXIDE 400 MG/1
400 TABLET ORAL DAILY
Qty: 30 TABLET | Refills: 1 | Status: SHIPPED | OUTPATIENT
Start: 2024-08-06

## 2024-08-06 RX ORDER — POTASSIUM CHLORIDE 20 MEQ/1
20 TABLET, EXTENDED RELEASE ORAL DAILY
Qty: 7 TABLET | Refills: 0 | Status: SHIPPED | OUTPATIENT
Start: 2024-08-06

## 2024-08-06 NOTE — TELEPHONE ENCOUNTER
Toya with [facility] is asking for clarification regarding a few medications as well as other concerns.    Midodrine - patient has some on hand in the home however there were no orders for this medication upon discharge from the skilled nursing facility. Should patient be taking this?    Potassium and Magnesium - If patient should still be taking these patient needs a refill, orders pended.    Toya states patient was experiencing hallucinations while she was at patients home. Patients orders included physical therapy occupational therapy and home aide, however patient denied these services and would only like nursing.    Please call Toya back regarding medications.

## 2024-08-09 ENCOUNTER — TELEPHONE (OUTPATIENT)
Dept: PRIMARY CARE CLINIC | Age: 64
End: 2024-08-09
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
## 2024-08-09 DIAGNOSIS — M79.642 PAIN OF LEFT HAND: ICD-10-CM

## 2024-08-09 NOTE — TELEPHONE ENCOUNTER
Nurse was doing a well check and patient had tripped over the vacuum cord. He has an abrasion on left knee and left hand. Lacking ROM in hand, it's swollen, and painful.     Vitals are all wnl /64, HR 62, Resp. 18, temp 97.9, O2 100% RA.    Please advise.

## 2024-08-10 ENCOUNTER — HOSPITAL ENCOUNTER (EMERGENCY)
Age: 64
Discharge: HOME | End: 2024-08-10
Attending: EMERGENCY MEDICINE
Payer: COMMERCIAL

## 2024-08-10 ENCOUNTER — APPOINTMENT (OUTPATIENT)
Dept: CT IMAGING | Age: 64
End: 2024-08-10
Payer: COMMERCIAL

## 2024-08-10 ENCOUNTER — APPOINTMENT (OUTPATIENT)
Dept: GENERAL RADIOLOGY | Age: 64
End: 2024-08-10
Payer: COMMERCIAL

## 2024-08-10 VITALS
OXYGEN SATURATION: 99 % | RESPIRATION RATE: 14 BRPM | TEMPERATURE: 97.9 F | WEIGHT: 172 LBS | SYSTOLIC BLOOD PRESSURE: 144 MMHG | HEART RATE: 80 BPM | DIASTOLIC BLOOD PRESSURE: 94 MMHG | BODY MASS INDEX: 25.48 KG/M2 | HEIGHT: 69 IN

## 2024-08-10 DIAGNOSIS — S89.92XA INJURY OF LEFT KNEE, INITIAL ENCOUNTER: ICD-10-CM

## 2024-08-10 DIAGNOSIS — S60.222A CONTUSION OF LEFT HAND, INITIAL ENCOUNTER: Primary | ICD-10-CM
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
## 2024-08-10 PROCEDURE — 70450 CT HEAD/BRAIN W/O DYE: CPT

## 2024-08-10 PROCEDURE — 99284 EMERGENCY DEPT VISIT MOD MDM: CPT

## 2024-08-10 PROCEDURE — 73562 X-RAY EXAM OF KNEE 3: CPT

## 2024-08-10 PROCEDURE — 73110 X-RAY EXAM OF WRIST: CPT

## 2024-08-10 PROCEDURE — 73130 X-RAY EXAM OF HAND: CPT

## 2024-08-10 ASSESSMENT — ENCOUNTER SYMPTOMS
EYE PAIN: 0
CHEST TIGHTNESS: 0
SHORTNESS OF BREATH: 0
SORE THROAT: 0
BACK PAIN: 0
ABDOMINAL PAIN: 0
VOMITING: 0
NAUSEA: 0
COUGH: 0
DIARRHEA: 0
CONSTIPATION: 0

## 2024-08-10 ASSESSMENT — PAIN - FUNCTIONAL ASSESSMENT: PAIN_FUNCTIONAL_ASSESSMENT: 0-10

## 2024-08-10 ASSESSMENT — PAIN SCALES - GENERAL: PAINLEVEL_OUTOF10: 9

## 2024-08-10 ASSESSMENT — PAIN DESCRIPTION - LOCATION: LOCATION: KNEE

## 2024-08-10 ASSESSMENT — PAIN DESCRIPTION - ORIENTATION: ORIENTATION: LEFT

## 2024-08-10 NOTE — ED TRIAGE NOTES
Mode of arrival (squad #, walk in, police, etc) : walk in        Chief complaint(s): fall, hand injury, knee injury        Arrival Note (brief scenario, treatment PTA, etc).: Pt reports he fell last night over his vacuum  cord. Pt states he is now having L hand and L knee pain. Pt has previous injuries which have been evaluated already. Pt is A&OX4.        C= \"Have you ever felt that you should Cut down on your drinking?\"  No  A= \"Have people Annoyed you by criticizing your drinking?\"  No  G= \"Have you ever felt bad or Guilty about your drinking?\"  No  E= \"Have you ever had a drink as an Eye-opener first thing in the morning to steady your nerves or to help a hangover?\"  No      Deferred []      Reason for deferring: N/A    *If yes to two or more: probable alcohol abuse.*

## 2024-08-10 NOTE — ED PROVIDER NOTES
[facility] ED  eMERGENCY dEPARTMENT eNCOUnter    Pt Name: Frank Lisa  MRN: [id number]  Birthdate 1960  Date of evaluation: 8/10/24  CHIEF COMPLAINT       Chief Complaint   Patient presents with    Fall    Hand Injury    Knee Injury     HISTORY OF PRESENT ILLNESS   HPI  Patient fell last night, he tripped on the vacuum cord. He fell forward and caught himself with his left arm. Since then he is having pain in his left hand, left knee. He denies striking his head or losing consciousness. He uses a walker to ambulate at baseline.     REVIEW OF SYSTEMS     Review of Systems   Constitutional:  Negative for chills and fever.   HENT:  Negative for congestion, ear pain and sore throat.    Eyes:  Negative for pain and visual disturbance.   Respiratory:  Negative for cough, chest tightness and shortness of breath.    Cardiovascular:  Negative for chest pain and palpitations.   Gastrointestinal:  Negative for abdominal pain, constipation, diarrhea, nausea and vomiting.   Genitourinary:  Negative for dysuria and testicular pain.   Musculoskeletal:  Negative for back pain.   Skin:  Negative for rash and wound.   Neurological:  Negative for seizures, syncope and headaches.     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 Mc, orthopedic, every 3-4 months, gets steroid injection    Lezama esophagus     Bleeding gastric varices 12/29/2022    Bowel perforation (HCC) 03/25/2023    BPH (benign prostatic hypertrophy)     Cholelithiasis     Cirrhosis (HCC)     COVID-19 12/2020    pt reports he had a positive test while at [facility] in 2020, was  asymptomatic    COVID-19 vaccine series completed 5/20/2021, 6/22/2021    Moderna 5/20/2021, 6/22/2021    DDD (degenerative disc disease), lumbar     Depression     Esophageal adenocarcinoma (HCC)     Esophageal cancer (HCC)     INVASIVE ADENOCARCINOMA ARISING IN TUBULAR ADENOMA WITH HIGH GRADE DYSPLASIA, ASSOCIATED WITH FOCAL INTESTINAL METAPLASIA     Esophageal varices (HCC)     Fatty liver     GERD (gastroesophageal reflux disease)     GI bleed     Heart murmur     Hep C w/o coma, chronic (HCC)     Hiatal hernia     History of alcohol abuse     6-12 beers a day; quit drinking 2019    History of blood transfusion     History of colon polyps 2016    History of tobacco abuse     [place] (hard of hearing)     Hyperlipidemia     Hypertension     Hyponatremia 07/20/2016    Hypotension 12/20/2021    Ileus (HCC) 04/12/2023    Lumbar radiculitis 11/08/2016    Malignant neoplasm of esophagus (HCC) 03/27/2024    Mastoid disorder, bilateral 12/31/2022    biLateral mastoid effusion    Pericardial effusion     PONV (postoperative nausea and vomiting)     Poor venous access     has port in place.    Port-A-Cath in place     right upper chest    Portal hypertension (HCC)     SBO (small bowel obstruction) (HCC) 04/01/2024    Sciatica     Secondary esophageal varices (HCC) 06/07/2022    Shortness of breath     Sleep difficulties     Small bowel obstruction (HCC) 03/31/2024    Spinal stenosis     Stomach ulcer     hx of    Thrombocytopenia (HCC) 12/23/2020    Tubular adenoma of colon 2016, 2018    Under care of team     PCP- Lopez LEONE    Under care of team     GI- Dr Cordova    Vitamin D deficiency     Wears glasses      SURGICAL HISTORY       Past Surgical History:   Procedure Laterality Date    BUNIONECTOMY      twice on right side    BUNIONECTOMY Left     CARPAL TUNNEL RELEASE Right     COLON SURGERY  10/10/2023    EXPLORATORY LAPAROTOMY, REVERSAL ILEOSTOMY WITH ILEOCOLOSTOMY, LYSIS OF ADHESIONS,    COLONOSCOPY      at age

## 2024-08-13 ENCOUNTER — OFFICE VISIT (OUTPATIENT)
Dept: PRIMARY CARE CLINIC | Age: 64
End: 2024-08-13

## 2024-08-13 VITALS
DIASTOLIC BLOOD PRESSURE: 82 MMHG | SYSTOLIC BLOOD PRESSURE: 130 MMHG | BODY MASS INDEX: 26.54 KG/M2 | HEART RATE: 78 BPM | HEIGHT: 69 IN | WEIGHT: 179.2 LBS | OXYGEN SATURATION: 97 %

## 2024-08-13 DIAGNOSIS — Z09 HOSPITAL DISCHARGE FOLLOW-UP: ICD-10-CM

## 2024-08-13 DIAGNOSIS — I10 ESSENTIAL HYPERTENSION: Primary | ICD-10-CM

## 2024-08-13 DIAGNOSIS — K70.30 ALCOHOLIC CIRRHOSIS, UNSPECIFIED WHETHER ASCITES PRESENT (HCC): ICD-10-CM

## 2024-08-13 DIAGNOSIS — K56.609 SMALL BOWEL OBSTRUCTION (HCC): ICD-10-CM

## 2024-08-13 NOTE — PROGRESS NOTES
cirrhosis of liver (HCC)    Vertebrogenic low back pain    DDD (degenerative disc disease), lumbar    Depression    Tubular adenoma of colon    History of colon polyps    Gynecomastia, male    Lumbar disc herniation    Tinnitus    Eustachian tube dysfunction    Ganglion cyst    Carpal tunnel syndrome of right wrist    History of hepatitis C    Vitamin D deficiency    Pure hypercholesterolemia    Hypokalemia    Essential hypertension    Recurrent major depressive disorder in partial remission (HCC)    S/P epidural steroid injection    Elevated LFTs    Seasonal allergies    Lumbar facet arthropathy    Cervical facet syndrome    Thrombocytopenia (HCC)    Hepatitis C virus infection resolved after antiviral drug therapy    Alcohol abuse    Altered mental status    Hypocalcemia    Hypophosphatemia    Malignant neoplasm of lower third of esophagus (HCC)    COVID-19    Anxiety    Current smoker    Severe comorbid illness    Multifactorial gait disorder    Abnormal findings on diagnostic imaging of spine    Foraminal stenosis of cervical region    Spinal stenosis of lumbar region with neurogenic claudication    Low hemoglobin    Anemia    Hypotension    Former smoker, 50+ pack years, quit 2016    HLD (hyperlipidemia)    Acute on chronic anemia    Acute kidney injury (HCC)    Ascites due to alcoholic cirrhosis (HCC)    Shortness of breath    Drop in hemoglobin    Esophageal polyp    Acute kidney failure, unspecified (HCC)    Muscle weakness (generalized)    Other abnormalities of gait and mobility    GI bleed    Goals of care, counseling/discussion    ACP (advance care planning)    Palliative care encounter    S/P TIPS (transjugular intrahepatic portosystemic shunt)    Acute metabolic encephalopathy    Lezama's esophagus with dysplasia    Mastoid disorder, bilateral    Acute encephalopathy    Chronic hepatitis C without hepatic coma (HCC)    Swelling of joint of upper arm, right    Anasarca    Lightheadedness    Alcohol

## 2024-08-15 ENCOUNTER — TELEPHONE (OUTPATIENT)
Dept: ORTHOPEDIC SURGERY | Age: 64
End: 2024-08-15

## 2024-08-15 DIAGNOSIS — S22.038A OTHER CLOSED FRACTURE OF THIRD THORACIC VERTEBRA, INITIAL ENCOUNTER (HCC): ICD-10-CM

## 2024-08-15 RX ORDER — HYDROCODONE BITARTRATE AND ACETAMINOPHEN 5; 325 MG/1; MG/1
1 TABLET ORAL EVERY 6 HOURS PRN
Qty: 28 TABLET | Refills: 0 | Status: SHIPPED | OUTPATIENT
Start: 2024-08-15 | End: 2024-08-22

## 2024-08-15 NOTE — TELEPHONE ENCOUNTER
Patient would like a refill on Pain medication    Kyphoplasty T3 on 7/24/24  Script given on 7/25/24    [facility] [place]

## 2024-08-29 DIAGNOSIS — S22.038A OTHER CLOSED FRACTURE OF THIRD THORACIC VERTEBRA, INITIAL ENCOUNTER (HCC): ICD-10-CM

## 2024-08-29 NOTE — TELEPHONE ENCOUNTER
Dr. Cueva,     Patient called office requesting a [place] refill.  Patient is status post T3 kyphoplasty on 7/24/24.     Norco 5-325 mg with 28 tabs pended. [facility] on [address] confirmed with patient.     Patient requested a previous refill that you approved on 8/15/24.

## 2024-08-29 NOTE — TELEPHONE ENCOUNTER
Patient called office requesting a [place] refill.  Patient is status post T3 kyphoplasty on 7/24/24.    Norco 5-325 mg with 28 tabs pended. [facility] on [address] confirmed with patient.    Patient requested a previous refill that you approved on 8/15/24.    Of note, I do not see where patient has follow up in office since his surgery. I attempted to contact patient to see about scheduling an appointment but no answer. I was able to leave a voicemail

## 2024-08-29 NOTE — TELEPHONE ENCOUNTER
Patient scheduled for appointment with Alba Owusu on 9/3/24. Declined sooner appointment as patient stated he had \"something to do tomorrow.\"

## 2024-09-03 ENCOUNTER — OFFICE VISIT (OUTPATIENT)
Dept: ORTHOPEDIC SURGERY | Age: 64
End: 2024-09-03
Payer: COMMERCIAL

## 2024-09-03 VITALS — BODY MASS INDEX: 26.57 KG/M2 | WEIGHT: 179.9 LBS

## 2024-09-03 DIAGNOSIS — Z98.890 S/P KYPHOPLASTY: Primary | ICD-10-CM

## 2024-09-03 PROCEDURE — 1123F ACP DISCUSS/DSCN MKR DOCD: CPT | Performed by: PHYSICIAN ASSISTANT

## 2024-09-03 PROCEDURE — 99213 OFFICE O/P EST LOW 20 MIN: CPT | Performed by: PHYSICIAN ASSISTANT

## 2024-09-03 RX ORDER — HYDROCODONE BITARTRATE AND ACETAMINOPHEN 5; 325 MG/1; MG/1
1 TABLET ORAL EVERY 6 HOURS PRN
Qty: 28 TABLET | Refills: 0 | Status: SHIPPED | OUTPATIENT
Start: 2024-09-03 | End: 2024-09-10

## 2024-09-05 NOTE — TELEPHONE ENCOUNTER
Left voicemail with patient informing him of norco script approval and to call the office with any questions or concerns.

## 2024-09-10 ENCOUNTER — OFFICE VISIT (OUTPATIENT)
Dept: ORTHOPEDIC SURGERY | Age: 64
End: 2024-09-10
Payer: COMMERCIAL

## 2024-09-10 VITALS — BODY MASS INDEX: 27.34 KG/M2 | WEIGHT: 184.6 LBS | HEIGHT: 69 IN | RESPIRATION RATE: 14 BRPM

## 2024-09-10 DIAGNOSIS — G56.23 CUBITAL TUNNEL SYNDROME OF BOTH UPPER EXTREMITIES: ICD-10-CM

## 2024-09-10 DIAGNOSIS — M48.02 FORAMINAL STENOSIS OF CERVICAL REGION: ICD-10-CM

## 2024-09-10 DIAGNOSIS — M54.2 NECK PAIN, CHRONIC: Primary | ICD-10-CM

## 2024-09-10 DIAGNOSIS — G89.29 NECK PAIN, CHRONIC: Primary | ICD-10-CM

## 2024-09-10 DIAGNOSIS — G62.9 NEUROPATHY: ICD-10-CM

## 2024-09-10 PROCEDURE — 1123F ACP DISCUSS/DSCN MKR DOCD: CPT | Performed by: ORTHOPAEDIC SURGERY

## 2024-09-10 PROCEDURE — 99213 OFFICE O/P EST LOW 20 MIN: CPT | Performed by: ORTHOPAEDIC SURGERY

## 2024-09-24 ENCOUNTER — HOSPITAL ENCOUNTER (EMERGENCY)
Age: 64
Discharge: HOME | End: 2024-09-24
Attending: EMERGENCY MEDICINE
Payer: COMMERCIAL

## 2024-09-24 VITALS
DIASTOLIC BLOOD PRESSURE: 73 MMHG | OXYGEN SATURATION: 96 % | HEART RATE: 66 BPM | SYSTOLIC BLOOD PRESSURE: 135 MMHG | HEIGHT: 70 IN | WEIGHT: 190 LBS | TEMPERATURE: 97.9 F | BODY MASS INDEX: 27.2 KG/M2 | RESPIRATION RATE: 16 BRPM

## 2024-09-24 DIAGNOSIS — G89.29 ACUTE EXACERBATION OF CHRONIC LOW BACK PAIN: Primary | ICD-10-CM

## 2024-09-24 DIAGNOSIS — M54.50 ACUTE EXACERBATION OF CHRONIC LOW BACK PAIN: Primary | ICD-10-CM

## 2024-09-24 PROCEDURE — 96372 THER/PROPH/DIAG INJ SC/IM: CPT

## 2024-09-24 PROCEDURE — 99284 EMERGENCY DEPT VISIT MOD MDM: CPT

## 2024-09-24 PROCEDURE — 6360000002 HC RX W HCPCS: Performed by: EMERGENCY MEDICINE

## 2024-09-24 RX ORDER — CYCLOBENZAPRINE HCL 10 MG
10 TABLET ORAL 3 TIMES DAILY PRN
Qty: 10 TABLET | Refills: 0 | Status: SHIPPED | OUTPATIENT
Start: 2024-09-24 | End: 2024-10-04

## 2024-09-24 RX ORDER — HYDROCODONE BITARTRATE AND ACETAMINOPHEN 5; 325 MG/1; MG/1
1 TABLET ORAL EVERY 6 HOURS PRN
Qty: 10 TABLET | Refills: 0 | Status: SHIPPED | OUTPATIENT
Start: 2024-09-24 | End: 2024-09-27

## 2024-09-24 RX ADMIN — HYDROMORPHONE HYDROCHLORIDE 1 MG: 1 INJECTION, SOLUTION INTRAMUSCULAR; INTRAVENOUS; SUBCUTANEOUS at 03:57

## 2024-09-24 ASSESSMENT — PAIN SCALES - GENERAL: PAINLEVEL_OUTOF10: 5

## 2024-10-01 ENCOUNTER — OFFICE VISIT (OUTPATIENT)
Dept: ORTHOPEDIC SURGERY | Age: 64
End: 2024-10-01
Payer: COMMERCIAL

## 2024-10-01 ENCOUNTER — HOSPITAL ENCOUNTER (OUTPATIENT)
Dept: PREADMISSION TESTING | Age: 64
Setting detail: OUTPATIENT SURGERY
Discharge: HOME | End: 2024-10-05
Payer: COMMERCIAL

## 2024-10-01 ENCOUNTER — ANESTHESIA EVENT (OUTPATIENT)
Dept: OPERATING ROOM | Age: 64
End: 2024-10-01
Payer: COMMERCIAL

## 2024-10-01 VITALS — BODY MASS INDEX: 26.05 KG/M2 | HEIGHT: 70 IN | WEIGHT: 182 LBS

## 2024-10-01 DIAGNOSIS — S22.080A COMPRESSION FRACTURE OF T12 VERTEBRA, INITIAL ENCOUNTER (HCC): ICD-10-CM

## 2024-10-01 DIAGNOSIS — M54.50 ACUTE MIDLINE LOW BACK PAIN WITHOUT SCIATICA: Primary | ICD-10-CM

## 2024-10-01 DIAGNOSIS — M80.00XA AGE-RELATED OSTEOPOROSIS WITH CURRENT PATHOLOGICAL FRACTURE, INITIAL ENCOUNTER: ICD-10-CM

## 2024-10-01 PROCEDURE — 99213 OFFICE O/P EST LOW 20 MIN: CPT | Performed by: ORTHOPAEDIC SURGERY

## 2024-10-01 PROCEDURE — 1123F ACP DISCUSS/DSCN MKR DOCD: CPT | Performed by: ORTHOPAEDIC SURGERY

## 2024-10-01 RX ORDER — OXYCODONE AND ACETAMINOPHEN 5; 325 MG/1; MG/1
1 TABLET ORAL EVERY 6 HOURS PRN
Qty: 20 TABLET | Refills: 0 | Status: ON HOLD | OUTPATIENT
Start: 2024-10-01 | End: 2024-10-06 | Stop reason: HOSPADM

## 2024-10-01 NOTE — PROGRESS NOTES
spot marking performed by Augusto Cordova MD at [facility]    UPPER GASTROINTESTINAL ENDOSCOPY N/A 02/12/2021    ENDOSCOPIC ULTRASOUND, EGD performed by Yo Santos MD at [facility]    UPPER GASTROINTESTINAL ENDOSCOPY  02/12/2021    EGD DIAGNOSTIC ONLY performed by Yo Santos MD at [facility]    UPPER GASTROINTESTINAL ENDOSCOPY N/A 08/31/2021    EGD BIOPSY performed by Augusto Cordova MD at [facility]    UPPER GASTROINTESTINAL ENDOSCOPY N/A 01/21/2022    EGD BIOPSY performed by Augusto Cordova MD at [facility]    UPPER GASTROINTESTINAL ENDOSCOPY N/A 04/15/2022    EGD ESOPHAGOGASTRODUODENOSCOPY performed by Lucian Harley MD at [facility]    UPPER GASTROINTESTINAL ENDOSCOPY N/A 06/06/2022    EGD BAND LIGATION performed by Bi Dawkins MD at [facility]    UPPER GASTROINTESTINAL ENDOSCOPY N/A 06/09/2022    EGD ESOPHAGOGASTRODUODENOSCOPY performed by Bi Dawkins MD at [facility]    UPPER GASTROINTESTINAL ENDOSCOPY N/A 09/14/2022    EGD ESOPHAGOGASTRODUODENOSCOPY ENDOSCOPIC APC AT GE JUNCTION performed by Jose Mckenzie MD at [facility]    UPPER GASTROINTESTINAL ENDOSCOPY N/A 10/19/2022    EGD BIOPSY performed by Augusto Cordova MD at [facility]    UPPER GASTROINTESTINAL ENDOSCOPY N/A 10/31/2022    EGD with APC performed by Augusto Cordova MD at [facility]    UPPER GASTROINTESTINAL ENDOSCOPY  12/29/2022    EGD ESOPHAGOGASTRODUODENOSCOPY performed by Yo Santos MD at [facility]    UPPER GASTROINTESTINAL ENDOSCOPY N/A 01/03/2023    EGD ESOPHAGOGASTRODUODENOSCOPY performed by Arsen Russo MD at [facility]    UPPER GASTROINTESTINAL ENDOSCOPY N/A 01/26/2023    EGD CONTROL HEMORRHAGE performed by Augusto Cordova MD at [facility]    UPPER GASTROINTESTINAL ENDOSCOPY N/A 02/13/2023    EGD WITH APC GOLD PROBE performed by Augusto Cordova MD at [facility]    UPPER GASTROINTESTINAL ENDOSCOPY N/A 03/17/2023    EGD WITH RESOLUTION CLIP APPLICATION X3 performed by Lucian Harley MD at [facility]

## 2024-10-01 NOTE — PRE-PROCEDURE INSTRUCTIONS
No answer, left message ?   Yes, voicemail                        Unable to leave message ?    When were you told to arrive at hospital ?  0845    Do you have a  ?    Are you on any blood thinners ?        no             If yes when did you stop taking ?    Do you have your prep Rx filled and instruction ?  N/a    Nothing to eat the day before , only clear liquids.n/a    Are you experiencing any covid symptoms ?     Do you have any infections or rash we should be aware of ?      Do you have the Hibiclens soap to use the night before and the morning of surgery ?    Nothing to eat or drink after midnight, only a sip of water to take any medication instructed to take the night before.  Wear comfortable clothing, leave any valuables at home, remove any jewelry and body piercing .

## 2024-10-01 NOTE — PROGRESS NOTES
holding area where you will be prepared for surgery.  A physical assessment will be performed by a nurse practitioner or house officer.  Your IV will be started and you will meet your anesthesiologist.    When you go to surgery, your family will be directed to the surgical waiting room, where the doctor should speak with them after your surgery.    After surgery, you will be taken to the recovery area.  When you are alert and stable, you will receive instructions and be prepared for discharge.     If you use a Bi-PAP or C-PAP machine, please bring it with you and leave it in the car in case it is needed in recovery room.    INSTRUCTIONS REVIEWED WITH ALHAJI, UNDERSTANDING VERBALIZED.  T-12 KYPHOPLASTY- 10/02/2024

## 2024-10-02 ENCOUNTER — ANESTHESIA (OUTPATIENT)
Dept: OPERATING ROOM | Age: 64
End: 2024-10-02
Payer: COMMERCIAL

## 2024-10-02 ENCOUNTER — APPOINTMENT (OUTPATIENT)
Dept: GENERAL RADIOLOGY | Age: 64
DRG: 981 | End: 2024-10-02
Attending: ORTHOPAEDIC SURGERY
Payer: COMMERCIAL

## 2024-10-02 ENCOUNTER — HOSPITAL ENCOUNTER (OUTPATIENT)
Age: 64
Setting detail: OUTPATIENT SURGERY
Discharge: HOME | DRG: 981 | End: 2024-10-02
Attending: ORTHOPAEDIC SURGERY | Admitting: ORTHOPAEDIC SURGERY
Payer: COMMERCIAL

## 2024-10-02 VITALS
BODY MASS INDEX: 26.05 KG/M2 | HEART RATE: 78 BPM | WEIGHT: 182 LBS | DIASTOLIC BLOOD PRESSURE: 76 MMHG | RESPIRATION RATE: 15 BRPM | OXYGEN SATURATION: 98 % | HEIGHT: 70 IN | TEMPERATURE: 96.8 F | SYSTOLIC BLOOD PRESSURE: 154 MMHG

## 2024-10-02 PROBLEM — S22.080A T12 COMPRESSION FRACTURE (HCC): Status: ACTIVE | Noted: 2024-10-02

## 2024-10-02 PROBLEM — M80.00XA OSTEOPOROSIS WITH CURRENT PATHOLOGICAL FRACTURE: Status: ACTIVE | Noted: 2024-07-24

## 2024-10-02 PROCEDURE — C1894 INTRO/SHEATH, NON-LASER: HCPCS | Performed by: ORTHOPAEDIC SURGERY

## 2024-10-02 PROCEDURE — 6360000002 HC RX W HCPCS: Performed by: ORTHOPAEDIC SURGERY

## 2024-10-02 PROCEDURE — 0PU43JZ SUPPLEMENT THORACIC VERTEBRA WITH SYNTHETIC SUBSTITUTE, PERCUTANEOUS APPROACH: ICD-10-PCS | Performed by: ORTHOPAEDIC SURGERY

## 2024-10-02 PROCEDURE — 2709999900 HC NON-CHARGEABLE SUPPLY: Performed by: ORTHOPAEDIC SURGERY

## 2024-10-02 PROCEDURE — 3700000001 HC ADD 15 MINUTES (ANESTHESIA): Performed by: ORTHOPAEDIC SURGERY

## 2024-10-02 PROCEDURE — 7100000010 HC PHASE II RECOVERY - FIRST 15 MIN: Performed by: ORTHOPAEDIC SURGERY

## 2024-10-02 PROCEDURE — 6370000000 HC RX 637 (ALT 250 FOR IP): Performed by: ANESTHESIOLOGY

## 2024-10-02 PROCEDURE — 6360000002 HC RX W HCPCS: Performed by: NURSE ANESTHETIST, CERTIFIED REGISTERED

## 2024-10-02 PROCEDURE — 7100000011 HC PHASE II RECOVERY - ADDTL 15 MIN: Performed by: ORTHOPAEDIC SURGERY

## 2024-10-02 PROCEDURE — 7100000001 HC PACU RECOVERY - ADDTL 15 MIN: Performed by: ORTHOPAEDIC SURGERY

## 2024-10-02 PROCEDURE — 3600000002 HC SURGERY LEVEL 2 BASE: Performed by: ORTHOPAEDIC SURGERY

## 2024-10-02 PROCEDURE — 7100000030 HC ASPR PHASE II RECOVERY - FIRST 15 MIN: Performed by: ORTHOPAEDIC SURGERY

## 2024-10-02 PROCEDURE — 3600000012 HC SURGERY LEVEL 2 ADDTL 15MIN: Performed by: ORTHOPAEDIC SURGERY

## 2024-10-02 PROCEDURE — C1713 ANCHOR/SCREW BN/BN,TIS/BN: HCPCS | Performed by: ORTHOPAEDIC SURGERY

## 2024-10-02 PROCEDURE — 3700000000 HC ANESTHESIA ATTENDED CARE: Performed by: ORTHOPAEDIC SURGERY

## 2024-10-02 PROCEDURE — 2500000003 HC RX 250 WO HCPCS: Performed by: ORTHOPAEDIC SURGERY

## 2024-10-02 PROCEDURE — 2500000003 HC RX 250 WO HCPCS: Performed by: NURSE ANESTHETIST, CERTIFIED REGISTERED

## 2024-10-02 PROCEDURE — 7100000031 HC ASPR PHASE II RECOVERY - ADDTL 15 MIN: Performed by: ORTHOPAEDIC SURGERY

## 2024-10-02 PROCEDURE — 7100000000 HC PACU RECOVERY - FIRST 15 MIN: Performed by: ORTHOPAEDIC SURGERY

## 2024-10-02 PROCEDURE — 6360000004 HC RX CONTRAST MEDICATION: Performed by: ORTHOPAEDIC SURGERY

## 2024-10-02 PROCEDURE — 0PS43ZZ REPOSITION THORACIC VERTEBRA, PERCUTANEOUS APPROACH: ICD-10-PCS | Performed by: ORTHOPAEDIC SURGERY

## 2024-10-02 PROCEDURE — 2580000003 HC RX 258: Performed by: ANESTHESIOLOGY

## 2024-10-02 RX ORDER — SODIUM CHLORIDE 0.9 % (FLUSH) 0.9 %
5-40 SYRINGE (ML) INJECTION PRN
Status: DISCONTINUED | OUTPATIENT
Start: 2024-10-02 | End: 2024-10-02 | Stop reason: HOSPADM

## 2024-10-02 RX ORDER — LIDOCAINE HYDROCHLORIDE 10 MG/ML
1 INJECTION, SOLUTION EPIDURAL; INFILTRATION; INTRACAUDAL; PERINEURAL
Status: DISCONTINUED | OUTPATIENT
Start: 2024-10-02 | End: 2024-10-02 | Stop reason: HOSPADM

## 2024-10-02 RX ORDER — METOCLOPRAMIDE HYDROCHLORIDE 5 MG/ML
10 INJECTION INTRAMUSCULAR; INTRAVENOUS
Status: DISCONTINUED | OUTPATIENT
Start: 2024-10-02 | End: 2024-10-02 | Stop reason: HOSPADM

## 2024-10-02 RX ORDER — EPHEDRINE SULFATE/0.9% NACL/PF 25 MG/5 ML
SYRINGE (ML) INTRAVENOUS
Status: DISCONTINUED | OUTPATIENT
Start: 2024-10-02 | End: 2024-10-02 | Stop reason: SDUPTHER

## 2024-10-02 RX ORDER — HYDROCODONE BITARTRATE AND ACETAMINOPHEN 5; 325 MG/1; MG/1
1 TABLET ORAL EVERY 6 HOURS PRN
Status: COMPLETED | OUTPATIENT
Start: 2024-10-02 | End: 2024-10-02

## 2024-10-02 RX ORDER — HYDRALAZINE HYDROCHLORIDE 20 MG/ML
10 INJECTION INTRAMUSCULAR; INTRAVENOUS
Status: DISCONTINUED | OUTPATIENT
Start: 2024-10-02 | End: 2024-10-02 | Stop reason: HOSPADM

## 2024-10-02 RX ORDER — SUCCINYLCHOLINE/SOD CL,ISO/PF 200MG/10ML
SYRINGE (ML) INTRAVENOUS
Status: DISCONTINUED | OUTPATIENT
Start: 2024-10-02 | End: 2024-10-02 | Stop reason: SDUPTHER

## 2024-10-02 RX ORDER — SODIUM CHLORIDE 9 MG/ML
INJECTION, SOLUTION INTRAVENOUS PRN
Status: DISCONTINUED | OUTPATIENT
Start: 2024-10-02 | End: 2024-10-02 | Stop reason: HOSPADM

## 2024-10-02 RX ORDER — SCOLOPAMINE TRANSDERMAL SYSTEM 1 MG/1
PATCH, EXTENDED RELEASE TRANSDERMAL
Status: DISCONTINUED
Start: 2024-10-02 | End: 2024-10-02 | Stop reason: HOSPADM

## 2024-10-02 RX ORDER — DEXAMETHASONE SODIUM PHOSPHATE 4 MG/ML
INJECTION, SOLUTION INTRA-ARTICULAR; INTRALESIONAL; INTRAMUSCULAR; INTRAVENOUS; SOFT TISSUE
Status: DISCONTINUED | OUTPATIENT
Start: 2024-10-02 | End: 2024-10-02 | Stop reason: SDUPTHER

## 2024-10-02 RX ORDER — ROCURONIUM BROMIDE 10 MG/ML
INJECTION, SOLUTION INTRAVENOUS
Status: DISCONTINUED | OUTPATIENT
Start: 2024-10-02 | End: 2024-10-02 | Stop reason: SDUPTHER

## 2024-10-02 RX ORDER — ONDANSETRON 2 MG/ML
4 INJECTION INTRAMUSCULAR; INTRAVENOUS
Status: DISCONTINUED | OUTPATIENT
Start: 2024-10-02 | End: 2024-10-02 | Stop reason: HOSPADM

## 2024-10-02 RX ORDER — LIDOCAINE HYDROCHLORIDE AND EPINEPHRINE 10; 10 MG/ML; UG/ML
INJECTION, SOLUTION INFILTRATION; PERINEURAL PRN
Status: DISCONTINUED | OUTPATIENT
Start: 2024-10-02 | End: 2024-10-02 | Stop reason: ALTCHOICE

## 2024-10-02 RX ORDER — DIPHENHYDRAMINE HYDROCHLORIDE 50 MG/ML
12.5 INJECTION INTRAMUSCULAR; INTRAVENOUS
Status: DISCONTINUED | OUTPATIENT
Start: 2024-10-02 | End: 2024-10-02 | Stop reason: HOSPADM

## 2024-10-02 RX ORDER — PROPOFOL 10 MG/ML
INJECTION, EMULSION INTRAVENOUS
Status: DISCONTINUED | OUTPATIENT
Start: 2024-10-02 | End: 2024-10-02 | Stop reason: SDUPTHER

## 2024-10-02 RX ORDER — IOPAMIDOL 408 MG/ML
INJECTION, SOLUTION INTRATHECAL PRN
Status: DISCONTINUED | OUTPATIENT
Start: 2024-10-02 | End: 2024-10-02 | Stop reason: ALTCHOICE

## 2024-10-02 RX ORDER — FENTANYL CITRATE 0.05 MG/ML
25 INJECTION, SOLUTION INTRAMUSCULAR; INTRAVENOUS EVERY 5 MIN PRN
Status: DISCONTINUED | OUTPATIENT
Start: 2024-10-02 | End: 2024-10-02 | Stop reason: HOSPADM

## 2024-10-02 RX ORDER — FENTANYL CITRATE 50 UG/ML
INJECTION, SOLUTION INTRAMUSCULAR; INTRAVENOUS
Status: DISCONTINUED | OUTPATIENT
Start: 2024-10-02 | End: 2024-10-02 | Stop reason: SDUPTHER

## 2024-10-02 RX ORDER — SCOLOPAMINE TRANSDERMAL SYSTEM 1 MG/1
1 PATCH, EXTENDED RELEASE TRANSDERMAL ONCE
Status: DISCONTINUED | OUTPATIENT
Start: 2024-10-02 | End: 2024-10-02 | Stop reason: HOSPADM

## 2024-10-02 RX ORDER — ONDANSETRON 2 MG/ML
INJECTION INTRAMUSCULAR; INTRAVENOUS
Status: DISCONTINUED | OUTPATIENT
Start: 2024-10-02 | End: 2024-10-02 | Stop reason: SDUPTHER

## 2024-10-02 RX ORDER — GABAPENTIN 100 MG/1
100 CAPSULE ORAL ONCE
Status: COMPLETED | OUTPATIENT
Start: 2024-10-02 | End: 2024-10-02

## 2024-10-02 RX ORDER — SODIUM CHLORIDE 0.9 % (FLUSH) 0.9 %
5-40 SYRINGE (ML) INJECTION EVERY 12 HOURS SCHEDULED
Status: DISCONTINUED | OUTPATIENT
Start: 2024-10-02 | End: 2024-10-02 | Stop reason: HOSPADM

## 2024-10-02 RX ORDER — ACETAMINOPHEN 500 MG
1000 TABLET ORAL ONCE
Status: DISCONTINUED | OUTPATIENT
Start: 2024-10-02 | End: 2024-10-02 | Stop reason: HOSPADM

## 2024-10-02 RX ORDER — LIDOCAINE HYDROCHLORIDE 20 MG/ML
INJECTION, SOLUTION EPIDURAL; INFILTRATION; INTRACAUDAL; PERINEURAL
Status: DISCONTINUED | OUTPATIENT
Start: 2024-10-02 | End: 2024-10-02 | Stop reason: SDUPTHER

## 2024-10-02 RX ORDER — NALOXONE HYDROCHLORIDE 0.4 MG/ML
INJECTION, SOLUTION INTRAMUSCULAR; INTRAVENOUS; SUBCUTANEOUS PRN
Status: DISCONTINUED | OUTPATIENT
Start: 2024-10-02 | End: 2024-10-02 | Stop reason: HOSPADM

## 2024-10-02 RX ORDER — SODIUM CHLORIDE, SODIUM LACTATE, POTASSIUM CHLORIDE, CALCIUM CHLORIDE 600; 310; 30; 20 MG/100ML; MG/100ML; MG/100ML; MG/100ML
INJECTION, SOLUTION INTRAVENOUS CONTINUOUS
Status: DISCONTINUED | OUTPATIENT
Start: 2024-10-02 | End: 2024-10-02 | Stop reason: HOSPADM

## 2024-10-02 RX ORDER — LABETALOL HYDROCHLORIDE 5 MG/ML
10 INJECTION, SOLUTION INTRAVENOUS
Status: DISCONTINUED | OUTPATIENT
Start: 2024-10-02 | End: 2024-10-02 | Stop reason: HOSPADM

## 2024-10-02 RX ADMIN — Medication 2000 MG: at 11:35

## 2024-10-02 RX ADMIN — PROPOFOL 200 MG: 10 INJECTION, EMULSION INTRAVENOUS at 11:25

## 2024-10-02 RX ADMIN — EPHEDRINE SULFATE 10 MG: 5 INJECTION INTRAVENOUS at 11:41

## 2024-10-02 RX ADMIN — DEXAMETHASONE SODIUM PHOSPHATE 8 MG: 4 INJECTION INTRA-ARTICULAR; INTRALESIONAL; INTRAMUSCULAR; INTRAVENOUS; SOFT TISSUE at 11:37

## 2024-10-02 RX ADMIN — ONDANSETRON 4 MG: 2 INJECTION INTRAMUSCULAR; INTRAVENOUS at 11:21

## 2024-10-02 RX ADMIN — FENTANYL CITRATE 100 MCG: 50 INJECTION INTRAMUSCULAR; INTRAVENOUS at 11:37

## 2024-10-02 RX ADMIN — ROCURONIUM BROMIDE 5 MG: 10 INJECTION, SOLUTION INTRAVENOUS at 11:25

## 2024-10-02 RX ADMIN — SODIUM CHLORIDE, POTASSIUM CHLORIDE, SODIUM LACTATE AND CALCIUM CHLORIDE: 600; 310; 30; 20 INJECTION, SOLUTION INTRAVENOUS at 10:04

## 2024-10-02 RX ADMIN — HYDROCODONE BITARTRATE AND ACETAMINOPHEN 1 TABLET: 5; 325 TABLET ORAL at 13:08

## 2024-10-02 RX ADMIN — Medication 140 MG: at 11:26

## 2024-10-02 RX ADMIN — GABAPENTIN 100 MG: 100 CAPSULE ORAL at 10:08

## 2024-10-02 RX ADMIN — LIDOCAINE HYDROCHLORIDE 100 MG: 20 INJECTION, SOLUTION EPIDURAL; INFILTRATION; INTRACAUDAL; PERINEURAL at 11:25

## 2024-10-02 ASSESSMENT — ENCOUNTER SYMPTOMS
ABDOMINAL PAIN: 1
BACK PAIN: 1
RESPIRATORY NEGATIVE: 1
SHORTNESS OF BREATH: 1
EYES NEGATIVE: 1

## 2024-10-02 ASSESSMENT — LIFESTYLE VARIABLES: SMOKING_STATUS: 0

## 2024-10-02 ASSESSMENT — PAIN - FUNCTIONAL ASSESSMENT
PAIN_FUNCTIONAL_ASSESSMENT: NONE - DENIES PAIN
PAIN_FUNCTIONAL_ASSESSMENT: 0-10
PAIN_FUNCTIONAL_ASSESSMENT: 0-10

## 2024-10-02 ASSESSMENT — PAIN DESCRIPTION - DESCRIPTORS
DESCRIPTORS: ACHING
DESCRIPTORS: ACHING

## 2024-10-02 ASSESSMENT — PAIN SCALES - GENERAL
PAINLEVEL_OUTOF10: 10
PAINLEVEL_OUTOF10: 10

## 2024-10-02 ASSESSMENT — PAIN DESCRIPTION - LOCATION: LOCATION: BACK

## 2024-10-02 NOTE — ANESTHESIA PRE PROCEDURE
Neck ROM: full  Mouth opening: > = 3 FB   Dental:    (+) poor dentition  Comment: Many chipped teeth    Pulmonary:normal exam  breath sounds clear to auscultation  (+)   shortness of breath:             (-) not a current smoker                           Cardiovascular:    (+) hypertension:, hyperlipidemia      ECG reviewed  Rhythm: regular  Rate: normal  Echocardiogram reviewed         Beta Blocker:  Dose within 24 Hrs      ROS comment: The left ventricle appears normal in size.  Mild to moderately increased LV wall thickness  No obvious wall motion abnormality noted  Normal LV systolic function, ejection fraction 60 to 65%  The right ventricle appears normal in size and function       Neuro/Psych:   (+) neuromuscular disease:, psychiatric history:depression/anxiety              ROS comment: T12 compression fracture GI/Hepatic/Renal:   (+) hiatal hernia, GERD: well controlled, PUD, hepatitis: C, liver disease (cirrhosis): esophageal varices, renal disease:          Endo/Other:    (+) blood dyscrasia: anemia:., malignancy/cancer (esophageal cancer).                 Abdominal:             Vascular:   + DVT.       Other Findings:       Anesthesia Plan      general     ASA 3       Induction: intravenous.    MIPS: Postoperative opioids intended and Prophylactic antiemetics administered.  Anesthetic plan and risks discussed with patient.      Plan discussed with CRNA.                Genevieve Chang MD   10/2/2024

## 2024-10-02 NOTE — OP NOTE
[place] [address]                            OPERATIVE REPORT      PATIENT NAME: ALHAJI COOLEY              : 1960  MED REC NO: [id number]                          ROOM:   ACCOUNT NO: [id number]                       ADMIT DATE: 10/02/2024  PROVIDER: Júnior Cueva MD      DATE OF PROCEDURE:  10/02/2024    SURGEON:  Júnior Cueva MD    PREOPERATIVE DIAGNOSIS:  Osteoporotic compression fracture, T12.    POSTOPERATIVE DIAGNOSIS:  Osteoporotic compression fracture, T12.    PROCEDURE PERFORMED:  Kyphoplasty, T12.    ASSISTANT:  None.    ANESTHESIA:  General.    BLOOD LOSS:  Minimal.    COMPLICATIONS:  None.    SPECIMEN:  None.    IMPLANTS:  Kyphon HV-R cement.    FINDINGS:    1. Level localized via fracture deformity of L1 and the rib on T12.  The patient actually has 6 lumbar vertebrae.  2. Good cement fill interdigitation.  3. Cement fill initiated on left, halted after cement leak to a lateral vein.  4. Cement fill on the right, excellent cement fill interdigitation, but halted after cement started leaking out through the superior endplate fracture.    PROCEDURE IN DETAIL:  The patient was taken to operating room, placed under general anesthesia, transferred to the Brant table, checked for padding and positioning.  AP and lateral fluoroscopy were arranged for the procedure.  The back was prepped and draped in usual sterile fashion with the aid of AP fluoroscopy.  18-gauge spinal needles were advanced from the skin to the pedicle docking position, at which point, a lateral fluoroscopic image was obtained.  18-gauge needles were back injected with local anesthetic and stab incisions were made.    The patient was now cannulated bipedicularly with minimal biplanar fluoroscopic views, initial docking, mid pedicle, posterior wall, final resting position.  After cannulating the vertebrae, vertebrae were drilled.  Balloons were

## 2024-10-02 NOTE — DISCHARGE INSTRUCTIONS
DISCHARGE INSTRUCTIONS FOR BACK SURGERY       You can expect your back/neck to feel stiff or sore after surgery.  This should improve in the weeks after surgery.  You may have trouble sitting or standing in one position for very long and may need pain medicine in the weeks after your surgery.  In order to continue your care at home, please follow the instructions below.    For General Anesthesia  Do not drink any alcoholic beverages or make any legal or important decisions for 24 hours.      Diet    Drink plenty of fluids after surgery, unless you are on a fluid restriction.  After general anesthesia, start out eating lightly (broth, soup, crackers, toast, etc.) advancing as tolerated to your usual diet.  Try to avoid spicy or greasy/fatty foods for 24 hours.  Avoid milk/milk product for several hours.  You may notice that your bowel movements are not regular right after your surgery. This is common. Try to avoid constipation and straining with bowel movements. You may want to take a fiber supplement every day. If you have not had a bowel movement after a couple of days, ask your doctor about taking a mild laxative.       Medications  Take medications as instructed by your surgeon.  If your prescribed pain medication is making you sick to your stomach, take it after meals.    Please do not take prescribed pain medication with alcoholic beverages.  When cleared to drive by your surgeon, please do not drive or operate machinery while taking any prescribed pain medication.    Activities  Activities as instructed by your surgeon.  Do back/neck exercises if instructed by surgeon.  Take it easy for the first 24 hours.  Rest when you feel tired.  Getting enough sleep will help you recover.    Try to walk each day. Start by walking a little more than you did the day before. Bit by bit, increase the amount you walk. Walking boosts blood flow and helps prevent pneumonia and constipation.  Walking may decrease your muscle

## 2024-10-02 NOTE — H&P
smoker, 50+ pack years, quit 2016 12/20/2021    HLD (hyperlipidemia) 12/20/2021    Abnormal findings on diagnostic imaging of spine 12/14/2021    Foraminal stenosis of cervical region 12/14/2021    Spinal stenosis of lumbar region with neurogenic claudication 12/14/2021    Severe comorbid illness 11/30/2021    Multifactorial gait disorder 11/30/2021    Current smoker 04/05/2021    COVID-19 02/23/2021    Anxiety 02/23/2021    Malignant neoplasm of lower third of esophagus (HCC)     Hypocalcemia 12/26/2020    Hypophosphatemia 12/26/2020    Alcohol abuse     Altered mental status     Thrombocytopenia (HCC) 12/23/2020    Hepatitis C virus infection resolved after antiviral drug therapy 12/23/2020    Cervical facet syndrome 11/23/2020    Lumbar facet arthropathy 08/17/2020    Elevated LFTs 08/12/2020    Seasonal allergies 08/12/2020    S/P epidural steroid injection 08/05/2020    Essential hypertension 04/24/2019    Recurrent major depressive disorder in partial remission (HCC) 04/24/2019    Pure hypercholesterolemia 02/04/2019    Hypokalemia 02/04/2019    Vitamin D deficiency 09/20/2017    History of hepatitis C 09/11/2017    Ganglion cyst 05/31/2017    Carpal tunnel syndrome of right wrist 05/31/2017    Tinnitus 03/23/2017    Eustachian tube dysfunction 03/23/2017    Lumbar disc herniation 11/08/2016    Gynecomastia, male 10/26/2016    Depression 10/13/2016    Vertebrogenic low back pain 10/06/2016    DDD (degenerative disc disease), lumbar 10/06/2016    Decompensation of cirrhosis of liver (HCC) 09/15/2016    Psychophysiologic insomnia 09/14/2016    Cirrhosis (HCC)     Hep C w/o coma, chronic (HCC)     Fatty liver     Calculus of gallbladder without cholecystitis 08/10/2016    Chronic viral hepatitis B without delta agent and without coma (HCC) 07/22/2016    Hypomagnesemia     Cervical radicular pain 01/04/2016    Tubular adenoma of colon 01/01/2016    History of colon polyps 01/01/2016    Back pain, chronic

## 2024-10-02 NOTE — BRIEF OP NOTE
Brief Postoperative Note      Patient: Frank Lisa  YOB: 1960  MRN: [id number]    Date of Procedure: 10/2/2024    Pre-Op Diagnosis Codes:      * Compression fracture of T12 vertebra, initial encounter ([facility]) [S22.080A]  osteoporosis  Post-Op Diagnosis: Same       Procedure(s):  T12 KYPHOPLASTY    Surgeon(s):  Júnior Cueva MD    Assistant:  * No surgical staff found *    Anesthesia: General    Estimated Blood Loss (mL): Minimal    Complications: None    Specimens:   * No specimens in log *    Implants:  Implant Name Type Inv. Item Serial No.  Lot No. LRB No. Used Action   CEMENT BNE HI VISC RADIOPAQUE KYPHON HV-R - [id number]  CEMENT BNE HI VISC RADIOPAQUE KYPHON HV-R  [email or web address]ANE[employer]-[id number] N/A 1 Implanted         Drains: * No LDAs found *    Findings:  Infection Present At Time Of Surgery (PATOS) (choose all levels that have infection present):  No infection present  Other Findings: see dictaiton    Electronically signed by Júnior Cueva MD on 10/2/2024 at 12:09 PM

## 2024-10-02 NOTE — ANESTHESIA POSTPROCEDURE EVALUATION
Department of Anesthesiology  Postprocedure Note    Patient: Frank Lisa  MRN: [id number]  YOB: 1960  Date of evaluation: 10/2/2024    Procedure Summary       Date: 10/02/24 Room / Location: [address]    Anesthesia Start: 1120 Anesthesia Stop: 1210    Procedure: KYPHOPLASTY T12 (Spine Thoracic) Diagnosis:       Compression fracture of T12 vertebra, initial encounter ([facility])      (Compression fracture of T12 vertebra, initial encounter ([facility]) [S22.080A])    Surgeons: Júnior Cueva MD Responsible Provider: Genevieve Chang MD    Anesthesia Type: general ASA Status: 3            Anesthesia Type: No value filed.    Yen Phase I: Yen Score: 10    Yen Phase II: Yen Score: 10    Anesthesia Post Evaluation    Comments: POST- ANESTHESIA EVALUATION       Pt Name: Frank Lisa  MRN: [id number]  YOB: 1960  Date of evaluation: 10/2/2024  Time:  2:42 PM      BP (!) 154/76   Pulse 78   Temp 96.8 °F (36 °C)   Resp 15   Ht 1.778 m (5' 10\")   Wt 82.6 kg (182 lb)   SpO2 98%   BMI 26.11 kg/m²      Consciousness Level  Awake  Cardiopulmonary Status  Stable  Pain Adequately Treated YES  Nausea / Vomiting  NO  Adequate Hydration  YES  Anesthesia Related Complications NONE      Electronically signed by Genevieve Chang MD on 10/2/2024 at 2:42 PM      No notable events documented.

## 2024-10-03 ENCOUNTER — TELEPHONE (OUTPATIENT)
Dept: PRIMARY CARE CLINIC | Age: 64
End: 2024-10-03

## 2024-10-03 NOTE — TELEPHONE ENCOUNTER
Toya from [facility] called and stated they are planning on discharging patient tomorrow     Patient is not answering the door   Not answering or returning phone calls       Patient was scheduled today and did not answer the door     [facility] will try one more home visit tomorrow but this will be for discharge     Cb for Toya [phone]

## 2024-10-04 ENCOUNTER — APPOINTMENT (OUTPATIENT)
Dept: GENERAL RADIOLOGY | Age: 64
DRG: 981 | End: 2024-10-04
Payer: COMMERCIAL

## 2024-10-04 ENCOUNTER — HOSPITAL ENCOUNTER (INPATIENT)
Age: 64
LOS: 2 days | Discharge: HOME HEALTH | DRG: 981 | End: 2024-10-06
Attending: STUDENT IN AN ORGANIZED HEALTH CARE EDUCATION/TRAINING PROGRAM | Admitting: INTERNAL MEDICINE
Payer: COMMERCIAL

## 2024-10-04 ENCOUNTER — APPOINTMENT (OUTPATIENT)
Dept: CT IMAGING | Age: 64
DRG: 981 | End: 2024-10-04
Payer: COMMERCIAL

## 2024-10-04 DIAGNOSIS — J18.9 PNEUMONIA OF RIGHT LOWER LOBE DUE TO INFECTIOUS ORGANISM: Primary | ICD-10-CM

## 2024-10-04 DIAGNOSIS — R41.82 ALTERED MENTAL STATUS, UNSPECIFIED ALTERED MENTAL STATUS TYPE: ICD-10-CM

## 2024-10-04 LAB
ALBUMIN SERPL-MCNC: 2.9 G/DL (ref 3.5–5.2)
ALP SERPL-CCNC: 184 U/L (ref 40–129)
ALT SERPL-CCNC: 9 U/L (ref 5–41)
AMMONIA PLAS-SCNC: 40 UMOL/L (ref 16–60)
AMPHET UR QL SCN: NEGATIVE
ANION GAP SERPL CALCULATED.3IONS-SCNC: 9 MMOL/L (ref 9–17)
APAP SERPL-MCNC: <5 UG/ML (ref 10–30)
AST SERPL-CCNC: 33 U/L
BACTERIA URNS QL MICRO: ABNORMAL
BARBITURATES UR QL SCN: NEGATIVE
BASOPHILS # BLD: 0 K/UL (ref 0–0.2)
BASOPHILS NFR BLD: 0 % (ref 0–2)
BENZODIAZ UR QL: NEGATIVE
BILIRUB DIRECT SERPL-MCNC: 0.5 MG/DL
BILIRUB INDIRECT SERPL-MCNC: 1.3 MG/DL (ref 0–1)
BILIRUB SERPL-MCNC: 1.8 MG/DL (ref 0.3–1.2)
BILIRUB UR QL STRIP: NEGATIVE
BUN SERPL-MCNC: 17 MG/DL (ref 8–23)
CALCIUM SERPL-MCNC: 9.2 MG/DL (ref 8.6–10.4)
CANNABINOIDS UR QL SCN: NEGATIVE
CASTS #/AREA URNS LPF: ABNORMAL /LPF
CHLORIDE SERPL-SCNC: 105 MMOL/L (ref 98–107)
CLARITY UR: CLEAR
CO2 SERPL-SCNC: 26 MMOL/L (ref 20–31)
COCAINE UR QL SCN: NEGATIVE
COLOR UR: YELLOW
CREAT SERPL-MCNC: 1.1 MG/DL (ref 0.7–1.2)
EOSINOPHIL # BLD: 0 K/UL (ref 0–0.4)
EOSINOPHILS RELATIVE PERCENT: 0 % (ref 0–4)
EPI CELLS #/AREA URNS HPF: ABNORMAL /HPF
ERYTHROCYTE [DISTWIDTH] IN BLOOD BY AUTOMATED COUNT: 14.3 % (ref 11.5–14.9)
ETHANOL PERCENT: <0.01 %
ETHANOLAMINE SERPL-MCNC: <10 MG/DL (ref 0–0.08)
FENTANYL UR QL: POSITIVE
GFR, ESTIMATED: 74 ML/MIN/1.73M2
GLUCOSE SERPL-MCNC: 86 MG/DL (ref 70–99)
GLUCOSE UR STRIP-MCNC: NEGATIVE MG/DL
HCT VFR BLD AUTO: 28.9 % (ref 41–53)
HGB BLD-MCNC: 9.8 G/DL (ref 13.5–17.5)
HGB UR QL STRIP.AUTO: ABNORMAL
INR PPP: 1.4
KETONES UR STRIP-MCNC: NEGATIVE MG/DL
LEUKOCYTE ESTERASE UR QL STRIP: NEGATIVE
LYMPHOCYTES NFR BLD: 0.8 K/UL (ref 1–4.8)
LYMPHOCYTES RELATIVE PERCENT: 8 % (ref 24–44)
MAGNESIUM SERPL-MCNC: 1.6 MG/DL (ref 1.6–2.6)
MCH RBC QN AUTO: 30.9 PG (ref 26–34)
MCHC RBC AUTO-ENTMCNC: 33.8 G/DL (ref 31–37)
MCV RBC AUTO: 91.4 FL (ref 80–100)
METHADONE UR QL: NEGATIVE
MONOCYTES NFR BLD: 1.3 K/UL (ref 0.1–1.3)
MONOCYTES NFR BLD: 13 % (ref 1–7)
NEUTROPHILS NFR BLD: 79 % (ref 36–66)
NEUTS SEG NFR BLD: 7.9 K/UL (ref 1.3–9.1)
NITRITE UR QL STRIP: NEGATIVE
OPIATES UR QL SCN: NEGATIVE
OXYCODONE UR QL SCN: POSITIVE
PCP UR QL SCN: NEGATIVE
PH UR STRIP: 7.5 [PH] (ref 5–8)
PLATELET # BLD AUTO: 165 K/UL (ref 150–450)
PMV BLD AUTO: 7.9 FL (ref 6–12)
POTASSIUM SERPL-SCNC: 3.9 MMOL/L (ref 3.7–5.3)
PROT SERPL-MCNC: 5.9 G/DL (ref 6.4–8.3)
PROT UR STRIP-MCNC: NEGATIVE MG/DL
PROTHROMBIN TIME: 17.6 SEC (ref 11.8–14.6)
RBC # BLD AUTO: 3.17 M/UL (ref 4.5–5.9)
RBC #/AREA URNS HPF: ABNORMAL /HPF
SALICYLATES SERPL-MCNC: <1 MG/DL (ref 3–10)
SODIUM SERPL-SCNC: 140 MMOL/L (ref 135–144)
SP GR UR STRIP: 1.01 (ref 1–1.03)
TEST INFORMATION: ABNORMAL
TSH SERPL DL<=0.05 MIU/L-ACNC: 0.35 UIU/ML (ref 0.3–5)
UROBILINOGEN UR STRIP-ACNC: NORMAL EU/DL (ref 0–1)
WBC #/AREA URNS HPF: ABNORMAL /HPF
WBC OTHER # BLD: 10 K/UL (ref 3.5–11)

## 2024-10-04 PROCEDURE — 82800 BLOOD PH: CPT

## 2024-10-04 PROCEDURE — 70450 CT HEAD/BRAIN W/O DYE: CPT

## 2024-10-04 PROCEDURE — 81001 URINALYSIS AUTO W/SCOPE: CPT

## 2024-10-04 PROCEDURE — 96374 THER/PROPH/DIAG INJ IV PUSH: CPT

## 2024-10-04 PROCEDURE — 6360000002 HC RX W HCPCS: Performed by: STUDENT IN AN ORGANIZED HEALTH CARE EDUCATION/TRAINING PROGRAM

## 2024-10-04 PROCEDURE — 80179 DRUG ASSAY SALICYLATE: CPT

## 2024-10-04 PROCEDURE — 85025 COMPLETE CBC W/AUTO DIFF WBC: CPT

## 2024-10-04 PROCEDURE — 84443 ASSAY THYROID STIM HORMONE: CPT

## 2024-10-04 PROCEDURE — G0480 DRUG TEST DEF 1-7 CLASSES: HCPCS

## 2024-10-04 PROCEDURE — 80076 HEPATIC FUNCTION PANEL: CPT

## 2024-10-04 PROCEDURE — 1200000000 HC SEMI PRIVATE

## 2024-10-04 PROCEDURE — 80048 BASIC METABOLIC PNL TOTAL CA: CPT

## 2024-10-04 PROCEDURE — 85610 PROTHROMBIN TIME: CPT

## 2024-10-04 PROCEDURE — 71045 X-RAY EXAM CHEST 1 VIEW: CPT

## 2024-10-04 PROCEDURE — 93005 ELECTROCARDIOGRAM TRACING: CPT

## 2024-10-04 PROCEDURE — 80307 DRUG TEST PRSMV CHEM ANLYZR: CPT

## 2024-10-04 PROCEDURE — 82805 BLOOD GASES W/O2 SATURATION: CPT

## 2024-10-04 PROCEDURE — 2580000003 HC RX 258: Performed by: STUDENT IN AN ORGANIZED HEALTH CARE EDUCATION/TRAINING PROGRAM

## 2024-10-04 PROCEDURE — 80143 DRUG ASSAY ACETAMINOPHEN: CPT

## 2024-10-04 PROCEDURE — 83735 ASSAY OF MAGNESIUM: CPT

## 2024-10-04 PROCEDURE — 36415 COLL VENOUS BLD VENIPUNCTURE: CPT

## 2024-10-04 PROCEDURE — 99285 EMERGENCY DEPT VISIT HI MDM: CPT

## 2024-10-04 PROCEDURE — 82140 ASSAY OF AMMONIA: CPT

## 2024-10-04 RX ORDER — ALBUTEROL SULFATE 0.83 MG/ML
2.5 SOLUTION RESPIRATORY (INHALATION)
Status: DISCONTINUED | OUTPATIENT
Start: 2024-10-05 | End: 2024-10-06 | Stop reason: HOSPADM

## 2024-10-04 RX ORDER — ONDANSETRON 2 MG/ML
4 INJECTION INTRAMUSCULAR; INTRAVENOUS EVERY 6 HOURS PRN
Status: DISCONTINUED | OUTPATIENT
Start: 2024-10-04 | End: 2024-10-06 | Stop reason: HOSPADM

## 2024-10-04 RX ORDER — POLYETHYLENE GLYCOL 3350 17 G/17G
17 POWDER, FOR SOLUTION ORAL DAILY PRN
Status: DISCONTINUED | OUTPATIENT
Start: 2024-10-04 | End: 2024-10-06 | Stop reason: HOSPADM

## 2024-10-04 RX ORDER — SODIUM CHLORIDE 9 MG/ML
INJECTION, SOLUTION INTRAVENOUS CONTINUOUS
Status: DISCONTINUED | OUTPATIENT
Start: 2024-10-05 | End: 2024-10-06

## 2024-10-04 RX ORDER — ONDANSETRON 4 MG/1
4 TABLET, ORALLY DISINTEGRATING ORAL EVERY 8 HOURS PRN
Status: DISCONTINUED | OUTPATIENT
Start: 2024-10-04 | End: 2024-10-06 | Stop reason: HOSPADM

## 2024-10-04 RX ORDER — AZITHROMYCIN 250 MG/1
500 TABLET, FILM COATED ORAL EVERY 24 HOURS
Status: DISCONTINUED | OUTPATIENT
Start: 2024-10-05 | End: 2024-10-06 | Stop reason: HOSPADM

## 2024-10-04 RX ORDER — ENOXAPARIN SODIUM 100 MG/ML
40 INJECTION SUBCUTANEOUS DAILY
Status: DISCONTINUED | OUTPATIENT
Start: 2024-10-05 | End: 2024-10-06 | Stop reason: HOSPADM

## 2024-10-04 RX ADMIN — CEFTRIAXONE SODIUM 1000 MG: 1 INJECTION, POWDER, FOR SOLUTION INTRAMUSCULAR; INTRAVENOUS at 22:40

## 2024-10-04 ASSESSMENT — PAIN - FUNCTIONAL ASSESSMENT: PAIN_FUNCTIONAL_ASSESSMENT: NONE - DENIES PAIN

## 2024-10-05 ENCOUNTER — APPOINTMENT (OUTPATIENT)
Dept: MRI IMAGING | Age: 64
DRG: 981 | End: 2024-10-05
Payer: COMMERCIAL

## 2024-10-05 LAB
25(OH)D3 SERPL-MCNC: 14.2 NG/ML (ref 30–100)
ABSOLUTE BANDS: 0.12 K/UL (ref 0–1)
ANION GAP SERPL CALCULATED.3IONS-SCNC: 9 MMOL/L (ref 9–17)
BANDS: 1 % (ref 0–10)
BASOPHILS # BLD: 0 K/UL (ref 0–0.2)
BASOPHILS NFR BLD: 0 % (ref 0–2)
BUN SERPL-MCNC: 16 MG/DL (ref 8–23)
CALCIUM SERPL-MCNC: 8.9 MG/DL (ref 8.6–10.4)
CHLORIDE SERPL-SCNC: 106 MMOL/L (ref 98–107)
CO2 SERPL-SCNC: 25 MMOL/L (ref 20–31)
COHGB MFR BLD: 3.4 % (ref 0–5)
CREAT SERPL-MCNC: 1.1 MG/DL (ref 0.7–1.2)
EOSINOPHIL # BLD: 0 K/UL (ref 0–0.4)
EOSINOPHILS RELATIVE PERCENT: 0 % (ref 0–4)
ERYTHROCYTE [DISTWIDTH] IN BLOOD BY AUTOMATED COUNT: 14.8 % (ref 11.5–14.9)
FOLATE SERPL-MCNC: 16.7 NG/ML (ref 4.8–24.2)
GFR, ESTIMATED: 74 ML/MIN/1.73M2
GLUCOSE SERPL-MCNC: 89 MG/DL (ref 70–99)
HCO3 VENOUS: 27.4 MMOL/L (ref 24–30)
HCT VFR BLD AUTO: 29.9 % (ref 41–53)
HGB BLD-MCNC: 10.1 G/DL (ref 13.5–17.5)
LYMPHOCYTES NFR BLD: 1.09 K/UL (ref 1–4.8)
LYMPHOCYTES RELATIVE PERCENT: 9 % (ref 24–44)
MCH RBC QN AUTO: 30.9 PG (ref 26–34)
MCHC RBC AUTO-ENTMCNC: 33.7 G/DL (ref 31–37)
MCV RBC AUTO: 91.8 FL (ref 80–100)
METHEMOGLOBIN: 0.2 % (ref 0–1.9)
MONOCYTES NFR BLD: 1.21 K/UL (ref 0.1–1.3)
MONOCYTES NFR BLD: 10 % (ref 1–7)
MORPHOLOGY: NORMAL
NEUTROPHILS NFR BLD: 80 % (ref 36–66)
NEUTS SEG NFR BLD: 9.68 K/UL (ref 1.3–9.1)
O2 SAT, VEN: 73.5 % (ref 60–85)
PCO2 VENOUS: 42.8 MM HG (ref 39–55)
PH VENOUS: 7.42 (ref 7.32–7.42)
PLATELET # BLD AUTO: 151 K/UL (ref 150–450)
PMV BLD AUTO: 8 FL (ref 6–12)
PO2 VENOUS: 40.5 MM HG (ref 30–50)
POSITIVE BASE EXCESS, VEN: 2.9 MMOL/L (ref 0–2)
POTASSIUM SERPL-SCNC: 3.7 MMOL/L (ref 3.7–5.3)
RBC # BLD AUTO: 3.26 M/UL (ref 4.5–5.9)
SODIUM SERPL-SCNC: 140 MMOL/L (ref 135–144)
TEXT FOR RESPIRATORY: ABNORMAL
VIT B12 SERPL-MCNC: 1190 PG/ML (ref 232–1245)
WBC OTHER # BLD: 12.1 K/UL (ref 3.5–11)

## 2024-10-05 PROCEDURE — 6360000002 HC RX W HCPCS: Performed by: STUDENT IN AN ORGANIZED HEALTH CARE EDUCATION/TRAINING PROGRAM

## 2024-10-05 PROCEDURE — 36415 COLL VENOUS BLD VENIPUNCTURE: CPT

## 2024-10-05 PROCEDURE — 80048 BASIC METABOLIC PNL TOTAL CA: CPT

## 2024-10-05 PROCEDURE — 6360000002 HC RX W HCPCS

## 2024-10-05 PROCEDURE — 85025 COMPLETE CBC W/AUTO DIFF WBC: CPT

## 2024-10-05 PROCEDURE — 2580000003 HC RX 258: Performed by: STUDENT IN AN ORGANIZED HEALTH CARE EDUCATION/TRAINING PROGRAM

## 2024-10-05 PROCEDURE — 94761 N-INVAS EAR/PLS OXIMETRY MLT: CPT

## 2024-10-05 PROCEDURE — 84425 ASSAY OF VITAMIN B-1: CPT

## 2024-10-05 PROCEDURE — 1200000000 HC SEMI PRIVATE

## 2024-10-05 PROCEDURE — 99222 1ST HOSP IP/OBS MODERATE 55: CPT | Performed by: PSYCHIATRY & NEUROLOGY

## 2024-10-05 PROCEDURE — 2580000003 HC RX 258

## 2024-10-05 PROCEDURE — 82746 ASSAY OF FOLIC ACID SERUM: CPT

## 2024-10-05 PROCEDURE — 6370000000 HC RX 637 (ALT 250 FOR IP)

## 2024-10-05 PROCEDURE — 82306 VITAMIN D 25 HYDROXY: CPT

## 2024-10-05 PROCEDURE — 82607 VITAMIN B-12: CPT

## 2024-10-05 PROCEDURE — 99223 1ST HOSP IP/OBS HIGH 75: CPT | Performed by: INTERNAL MEDICINE

## 2024-10-05 PROCEDURE — 94640 AIRWAY INHALATION TREATMENT: CPT

## 2024-10-05 PROCEDURE — 6360000002 HC RX W HCPCS: Performed by: INTERNAL MEDICINE

## 2024-10-05 PROCEDURE — 6370000000 HC RX 637 (ALT 250 FOR IP): Performed by: INTERNAL MEDICINE

## 2024-10-05 PROCEDURE — 70551 MRI BRAIN STEM W/O DYE: CPT

## 2024-10-05 RX ORDER — OXYCODONE HYDROCHLORIDE 5 MG/1
5 TABLET ORAL EVERY 6 HOURS PRN
Status: DISCONTINUED | OUTPATIENT
Start: 2024-10-05 | End: 2024-10-05

## 2024-10-05 RX ORDER — KETOROLAC TROMETHAMINE 30 MG/ML
15 INJECTION, SOLUTION INTRAMUSCULAR; INTRAVENOUS EVERY 6 HOURS PRN
Status: DISCONTINUED | OUTPATIENT
Start: 2024-10-05 | End: 2024-10-06 | Stop reason: HOSPADM

## 2024-10-05 RX ORDER — KETOROLAC TROMETHAMINE 30 MG/ML
30 INJECTION, SOLUTION INTRAMUSCULAR; INTRAVENOUS EVERY 6 HOURS PRN
Status: DISCONTINUED | OUTPATIENT
Start: 2024-10-05 | End: 2024-10-05

## 2024-10-05 RX ORDER — ACETAMINOPHEN 325 MG/1
325 TABLET ORAL EVERY 4 HOURS PRN
Status: DISCONTINUED | OUTPATIENT
Start: 2024-10-05 | End: 2024-10-06 | Stop reason: HOSPADM

## 2024-10-05 RX ORDER — HYDROCODONE BITARTRATE AND ACETAMINOPHEN 5; 325 MG/1; MG/1
1 TABLET ORAL EVERY 6 HOURS PRN
Status: DISCONTINUED | OUTPATIENT
Start: 2024-10-05 | End: 2024-10-06 | Stop reason: HOSPADM

## 2024-10-05 RX ORDER — ASPIRIN 81 MG/1
81 TABLET, CHEWABLE ORAL DAILY
Status: DISCONTINUED | OUTPATIENT
Start: 2024-10-05 | End: 2024-10-06 | Stop reason: HOSPADM

## 2024-10-05 RX ORDER — ATORVASTATIN CALCIUM 40 MG/1
40 TABLET, FILM COATED ORAL NIGHTLY
Status: DISCONTINUED | OUTPATIENT
Start: 2024-10-05 | End: 2024-10-06 | Stop reason: HOSPADM

## 2024-10-05 RX ADMIN — SODIUM CHLORIDE: 9 INJECTION, SOLUTION INTRAVENOUS at 14:18

## 2024-10-05 RX ADMIN — CEFTRIAXONE SODIUM 1000 MG: 1 INJECTION, POWDER, FOR SOLUTION INTRAMUSCULAR; INTRAVENOUS at 22:21

## 2024-10-05 RX ADMIN — AZITHROMYCIN DIHYDRATE 500 MG: 250 TABLET ORAL at 22:20

## 2024-10-05 RX ADMIN — OXYCODONE HYDROCHLORIDE 5 MG: 5 TABLET ORAL at 03:01

## 2024-10-05 RX ADMIN — AZITHROMYCIN 500 MG: 500 INJECTION, POWDER, LYOPHILIZED, FOR SOLUTION INTRAVENOUS at 00:26

## 2024-10-05 RX ADMIN — ALBUTEROL SULFATE 2.5 MG: 2.5 SOLUTION RESPIRATORY (INHALATION) at 11:05

## 2024-10-05 RX ADMIN — ASPIRIN 81 MG 81 MG: 81 TABLET ORAL at 12:05

## 2024-10-05 RX ADMIN — ALBUTEROL SULFATE 2.5 MG: 2.5 SOLUTION RESPIRATORY (INHALATION) at 15:34

## 2024-10-05 RX ADMIN — SODIUM CHLORIDE: 9 INJECTION, SOLUTION INTRAVENOUS at 00:22

## 2024-10-05 RX ADMIN — HYDROCODONE BITARTRATE AND ACETAMINOPHEN 1 TABLET: 5; 325 TABLET ORAL at 09:22

## 2024-10-05 RX ADMIN — ATORVASTATIN CALCIUM 40 MG: 40 TABLET, FILM COATED ORAL at 12:05

## 2024-10-05 RX ADMIN — ENOXAPARIN SODIUM 40 MG: 100 INJECTION SUBCUTANEOUS at 08:09

## 2024-10-05 RX ADMIN — KETOROLAC TROMETHAMINE 15 MG: 30 INJECTION, SOLUTION INTRAMUSCULAR at 13:32

## 2024-10-05 RX ADMIN — ALBUTEROL SULFATE 2.5 MG: 2.5 SOLUTION RESPIRATORY (INHALATION) at 06:48

## 2024-10-05 ASSESSMENT — PAIN DESCRIPTION - LOCATION
LOCATION: BACK

## 2024-10-05 ASSESSMENT — PAIN DESCRIPTION - ORIENTATION
ORIENTATION: MID

## 2024-10-05 ASSESSMENT — PAIN - FUNCTIONAL ASSESSMENT: PAIN_FUNCTIONAL_ASSESSMENT: PREVENTS OR INTERFERES SOME ACTIVE ACTIVITIES AND ADLS

## 2024-10-05 ASSESSMENT — PAIN SCALES - GENERAL
PAINLEVEL_OUTOF10: 5
PAINLEVEL_OUTOF10: 7
PAINLEVEL_OUTOF10: 8
PAINLEVEL_OUTOF10: 8
PAINLEVEL_OUTOF10: 0

## 2024-10-05 ASSESSMENT — PAIN DESCRIPTION - DESCRIPTORS
DESCRIPTORS: ACHING;DULL

## 2024-10-05 ASSESSMENT — PAIN SCALES - WONG BAKER: WONGBAKER_NUMERICALRESPONSE: NO HURT

## 2024-10-05 NOTE — ED PROVIDER NOTES
Heart sounds: Normal heart sounds.   Pulmonary:      Effort: Pulmonary effort is normal.      Breath sounds: Normal breath sounds.   Abdominal:      Palpations: Abdomen is soft.      Tenderness: There is no abdominal tenderness.   Musculoskeletal:         General: No swelling or deformity.      Cervical back: Normal range of motion.   Skin:     General: Skin is warm.      Findings: No rash.   Neurological:      General: No focal deficit present.      Mental Status: He is alert and oriented to person, place, and time.      Comments: Cranial nerves II through XII intact, 5 out of 5 strength in upper and lower extremities, sensation intact throughout, normal finger-nose    NIHSS 0   Psychiatric:         Mood and Affect: Mood normal.      Comments: Cranial nerves II through XII intact, 5 out of 5 strength in upper and lower extremities, sensation intact throughout, normal finger-nose         MEDICAL DECISION MAKING / ED COURSE:     64-year-old presenting with some confusion    1)  Number and Complexity of Problems Addressed at this Encounter  Problem List This Visit: Confusion    Differential Diagnosis: Infection, metabolic encephalopathy, hepatic encephalopathy, electrolyte derangement, intoxication, medication side effect    Diagnoses Considered but Do Not Suspect: CVA, intracranial bleed    Pertinent Comorbid Conditions: See history    2)  Data Reviewed and Analyzed  (Lab and radiology tests/orders below in next section)    External Documents Reviewed:  recent surgery    My EKG interpretation:  see procedures       3)  Treatment and Disposition        ED Course as of 10/04/24 2238   Fri Oct 04, 2024   2045 CBC with Auto Differential(!):    WBC 10.0   RBC 3.17(!)   Hemoglobin Quant 9.8(!)   Hematocrit 28.9(!)   MCV 91.4   MCH 30.9   MCHC 33.8   RDW 14.3   Platelet Count 165   MPV 7.9   Neutrophils % 79(!)   Lymphocyte % 8(!)   Monocytes % 13(!)   Eosinophils % 0   Basophils % 0   Neutrophils Absolute 7.90

## 2024-10-05 NOTE — ED NOTES
Report given to Med-C, RN from .   Report method by phone   The following was reviewed with receiving RN:   Current vital signs:  BP (!) 196/99   Pulse 81   Temp 98 °F (36.7 °C) (Oral)   Resp 22   Ht 1.778 m (5' 10\")   Wt 81.6 kg (180 lb)   SpO2 100%   BMI 25.83 kg/m²                MEWS Score: 0     Any medication or safety alerts were reviewed. Any pending diagnostics and notifications were also reviewed, as well as any safety concerns or issues, abnormal labs, abnormal imaging, and abnormal assessment findings. Questions were answered. Informed RN of azithromycin to be administered.

## 2024-10-05 NOTE — ED TRIAGE NOTES
Mode of arrival (squad #, walk in, police, etc) : walked in        Chief complaint(s): confused post surgery on 10/2/24        Arrival Note (brief scenario, treatment PTA, etc).: confused post surgery bought in by person taking care of dog since surgery has not called found patient was confused         C= \"Have you ever felt that you should Cut down on your drinking?\"  No  A= \"Have people Annoyed you by criticizing your drinking?\"  No  G= \"Have you ever felt bad or Guilty about your drinking?\"  No  E= \"Have you ever had a drink as an Eye-opener first thing in the morning to steady your nerves or to help a hangover?\"  No      Deferred []      Reason for deferring: N/A    *If yes to two or more: probable alcohol abuse.*

## 2024-10-05 NOTE — CARE COORDINATION
Case Management Assessment  Initial Evaluation    Date/Time of Evaluation: 10/5/2024 10:10 AM  Assessment Completed by: Dianne Jarquin RN    If patient is discharged prior to next notation, then this note serves as note for discharge by case management.    Patient Name: Frank Lisa                   YOB: 1960  Diagnosis: Altered mental status, unspecified altered mental status type [R41.82]  Pneumonia of right lower lobe due to infectious organism [J18.9]  AMS (altered mental status) [R41.82]                   Date / Time: 10/4/2024  8:04 PM    Patient Admission Status: Inpatient   Readmission Risk (Low < 19, Mod (19-27), High > 27): Readmission Risk Score: 41.4    Current PCP: Lopez Rosario PA  PCP verified by CM? Yes    Chart Reviewed: Yes      History Provided by: Patient, Medical Record  Patient Orientation: (S) Alert and Oriented (some confusion, had to repeat questions)    Patient Cognition: Alert    Hospitalization in the last 30 days (Readmission):  No    If yes, Readmission Assessment in CM Navigator will be completed.    Advance Directives:      Code Status: Full Code   Patient's Primary Decision Maker is: Named in Scanned ACP Document    Primary Decision Maker: Nilam Lisa - Ex-Spouse - [phone]    Discharge Planning:    Patient lives with: Alone Type of Home: House  Primary Care Giver: Self  Patient Support Systems include: Spouse/Significant Other (EX SPOUSE)   Current Financial resources: Medicare, Medicaid  Current community resources: (S) ECF/Home Care ([facility]-current)  Current services prior to admission: Durable Medical Equipment, Home Care            Current DME: Cane, Walker            Type of Home Care services:  OT, PT, Nursing Services    ADLS  Prior functional level: Independent in ADLs/IADLs  Current functional level: Independent in ADLs/IADLs    PT AM-PAC:   /24  OT AM-PAC:   /24    Family can provide assistance at DC: Yes  Would you like Case

## 2024-10-05 NOTE — H&P
history of COPD history of osteoporosis with recent diagnosis of T12 compression fracture patient received kyphoplasty 3 days ago on October 3 was discharged home yesterday wife found him confused delirious was brought into the emergency room workup was negative for UTI urine drug screen positive for fentanyl and oxycodone  Chest x-ray concerning for right lower lobe pneumonia patient is a poor historian  Will treat for community-acquired pneumonia oral Rocephin and Zithromax  Reduce any opioids avoid any sedatives and Ativan  Patient delirium likely secondary to underlying infection possible superimposed opioid use from recent pain medication prescription from kyphoplasty and compression T12 fracture  OT PT  Dvt prophy  Admit med surg      Consultations:   None     Patient is admitted as inpatient status because of co-morbidities listed above, severity of signs and symptoms as outlined, requirement for current medical therapies and most importantly because of direct risk to patient if care not provided in a hospital setting.  Expected length of stay > 48 hours.    Jaziel Cerda MD  10/5/2024  8:00 AM    Copy sent to Lopez Luke, PA    Please note that this chart was generated using voice recognition Dragon dictation software.  Although every effort was made to ensure the accuracy of this automated transcription, some errors in transcription may have occurred.

## 2024-10-06 VITALS
BODY MASS INDEX: 25.77 KG/M2 | OXYGEN SATURATION: 99 % | DIASTOLIC BLOOD PRESSURE: 57 MMHG | HEART RATE: 70 BPM | TEMPERATURE: 98.5 F | WEIGHT: 180 LBS | HEIGHT: 70 IN | SYSTOLIC BLOOD PRESSURE: 160 MMHG | RESPIRATION RATE: 17 BRPM

## 2024-10-06 LAB
ANION GAP SERPL CALCULATED.3IONS-SCNC: 5 MMOL/L (ref 9–17)
BASOPHILS # BLD: 0 K/UL (ref 0–0.2)
BASOPHILS NFR BLD: 0 % (ref 0–2)
BUN SERPL-MCNC: 16 MG/DL (ref 8–23)
CALCIUM SERPL-MCNC: 8.1 MG/DL (ref 8.6–10.4)
CHLORIDE SERPL-SCNC: 110 MMOL/L (ref 98–107)
CO2 SERPL-SCNC: 25 MMOL/L (ref 20–31)
CREAT SERPL-MCNC: 1.1 MG/DL (ref 0.7–1.2)
EOSINOPHIL # BLD: 0.1 K/UL (ref 0–0.4)
EOSINOPHILS RELATIVE PERCENT: 1 % (ref 0–4)
ERYTHROCYTE [DISTWIDTH] IN BLOOD BY AUTOMATED COUNT: 14.9 % (ref 11.5–14.9)
GFR, ESTIMATED: 74 ML/MIN/1.73M2
GLUCOSE SERPL-MCNC: 97 MG/DL (ref 70–99)
HCT VFR BLD AUTO: 27.3 % (ref 41–53)
HGB BLD-MCNC: 9.1 G/DL (ref 13.5–17.5)
LYMPHOCYTES NFR BLD: 1 K/UL (ref 1–4.8)
LYMPHOCYTES RELATIVE PERCENT: 13 % (ref 24–44)
MAGNESIUM SERPL-MCNC: 1.4 MG/DL (ref 1.6–2.6)
MCH RBC QN AUTO: 30.6 PG (ref 26–34)
MCHC RBC AUTO-ENTMCNC: 33.2 G/DL (ref 31–37)
MCV RBC AUTO: 92.4 FL (ref 80–100)
MONOCYTES NFR BLD: 1.1 K/UL (ref 0.1–1.3)
MONOCYTES NFR BLD: 15 % (ref 1–7)
NEUTROPHILS NFR BLD: 71 % (ref 36–66)
NEUTS SEG NFR BLD: 5.1 K/UL (ref 1.3–9.1)
PLATELET # BLD AUTO: 129 K/UL (ref 150–450)
PMV BLD AUTO: 7.8 FL (ref 6–12)
POTASSIUM SERPL-SCNC: 3.5 MMOL/L (ref 3.7–5.3)
RBC # BLD AUTO: 2.96 M/UL (ref 4.5–5.9)
SODIUM SERPL-SCNC: 140 MMOL/L (ref 135–144)
WBC OTHER # BLD: 7.3 K/UL (ref 3.5–11)

## 2024-10-06 PROCEDURE — 97530 THERAPEUTIC ACTIVITIES: CPT

## 2024-10-06 PROCEDURE — 85025 COMPLETE CBC W/AUTO DIFF WBC: CPT

## 2024-10-06 PROCEDURE — 97166 OT EVAL MOD COMPLEX 45 MIN: CPT

## 2024-10-06 PROCEDURE — 99239 HOSP IP/OBS DSCHRG MGMT >30: CPT | Performed by: INTERNAL MEDICINE

## 2024-10-06 PROCEDURE — 97162 PT EVAL MOD COMPLEX 30 MIN: CPT

## 2024-10-06 PROCEDURE — 99232 SBSQ HOSP IP/OBS MODERATE 35: CPT | Performed by: PSYCHIATRY & NEUROLOGY

## 2024-10-06 PROCEDURE — 6370000000 HC RX 637 (ALT 250 FOR IP): Performed by: INTERNAL MEDICINE

## 2024-10-06 PROCEDURE — 36415 COLL VENOUS BLD VENIPUNCTURE: CPT

## 2024-10-06 PROCEDURE — 83735 ASSAY OF MAGNESIUM: CPT

## 2024-10-06 PROCEDURE — 80048 BASIC METABOLIC PNL TOTAL CA: CPT

## 2024-10-06 RX ORDER — ERGOCALCIFEROL 1.25 MG/1
50000 CAPSULE, LIQUID FILLED ORAL WEEKLY
Qty: 12 CAPSULE | Refills: 1 | Status: SHIPPED | OUTPATIENT
Start: 2024-10-06

## 2024-10-06 RX ORDER — ATORVASTATIN CALCIUM 40 MG/1
40 TABLET, FILM COATED ORAL NIGHTLY
Qty: 30 TABLET | Refills: 3 | Status: SHIPPED | OUTPATIENT
Start: 2024-10-06

## 2024-10-06 RX ORDER — ERGOCALCIFEROL 1.25 MG/1
50000 CAPSULE, LIQUID FILLED ORAL WEEKLY
Status: DISCONTINUED | OUTPATIENT
Start: 2024-10-06 | End: 2024-10-06 | Stop reason: HOSPADM

## 2024-10-06 RX ORDER — ASPIRIN 81 MG/1
81 TABLET, CHEWABLE ORAL DAILY
Qty: 30 TABLET | Refills: 3 | Status: SHIPPED | OUTPATIENT
Start: 2024-10-06

## 2024-10-06 RX ADMIN — HYDROCODONE BITARTRATE AND ACETAMINOPHEN 1 TABLET: 5; 325 TABLET ORAL at 08:19

## 2024-10-06 RX ADMIN — HYDROCODONE BITARTRATE AND ACETAMINOPHEN 1 TABLET: 5; 325 TABLET ORAL at 01:18

## 2024-10-06 RX ADMIN — ERGOCALCIFEROL 50000 UNITS: 1.25 CAPSULE ORAL at 10:40

## 2024-10-06 ASSESSMENT — PAIN SCALES - GENERAL
PAINLEVEL_OUTOF10: 3
PAINLEVEL_OUTOF10: 8
PAINLEVEL_OUTOF10: 7

## 2024-10-06 ASSESSMENT — PAIN DESCRIPTION - ORIENTATION: ORIENTATION: LOWER

## 2024-10-06 ASSESSMENT — PAIN DESCRIPTION - LOCATION
LOCATION: BACK
LOCATION: ABDOMEN
LOCATION: BACK

## 2024-10-06 NOTE — CARE COORDINATION
ONGOING DISCHARGE PLAN:    Patient is alert and oriented x4.    Spoke with patient regarding discharge plan and patient confirms that plan is still home w/ [facility].  Writer spoke w/Lesley at [facility].       PO zith/IV rocephin    PT/OT    Negative MRI Brain      IMM letter provided to patient.  Patient offered four hours to make informed decision regarding appeal process; patient agreeable to discharge.      Will continue to follow for additional discharge needs.    If patient is discharged prior to next notation, then this note serves as note for discharge by case management.    Electronically signed by Dianne Jarquin RN on 10/6/2024 at 9:27 AM

## 2024-10-06 NOTE — PLAN OF CARE
Problem: Discharge Planning  Goal: Discharge to home or other facility with appropriate resources  10/6/2024 0917 by Rich Mary RN  Outcome: Adequate for Discharge     Problem: Safety - Adult  Goal: Free from fall injury  10/6/2024 0917 by Rich Mary RN  Outcome: Adequate for Discharge     Problem: Pain  Goal: Verbalizes/displays adequate comfort level or baseline comfort level  10/6/2024 0917 by Rich Mary RN  Outcome: Adequate for Discharge     Problem: Neurosensory - Adult  Goal: Achieves stable or improved neurological status  10/6/2024 0917 by Rich Mary RN  Outcome: Adequate for Discharge     Problem: Neurosensory - Adult  Goal: Achieves maximal functionality and self care  10/6/2024 0917 by Rich Mary RN  Outcome: Adequate for Discharge     Problem: Respiratory - Adult  Goal: Achieves optimal ventilation and oxygenation  10/6/2024 0917 by Rich Mary RN  Outcome: Adequate for Discharge     Problem: Skin/Tissue Integrity - Adult  Goal: Skin integrity remains intact  10/6/2024 0917 by Rich Mary RN  Outcome: Adequate for Discharge

## 2024-10-06 NOTE — PLAN OF CARE
Problem: Discharge Planning  Goal: Discharge to home or other facility with appropriate resources  10/6/2024 0312 by Ellen Whitman RN  Outcome: Progressing  10/5/2024 1738 by Rich Mary RN  Outcome: Progressing     Problem: Safety - Adult  Goal: Free from fall injury  10/6/2024 0312 by Ellen Whitman RN  Outcome: Progressing  10/5/2024 1738 by Rich Mary RN  Outcome: Progressing     Problem: ABCDS Injury Assessment  Goal: Absence of physical injury  Outcome: Progressing     Problem: Pain  Goal: Verbalizes/displays adequate comfort level or baseline comfort level  10/6/2024 0312 by Ellen Whitman RN  Outcome: Progressing  10/5/2024 1738 by Rich Mary RN  Outcome: Progressing     Problem: Neurosensory - Adult  Goal: Achieves stable or improved neurological status  Outcome: Progressing  Goal: Achieves maximal functionality and self care  Outcome: Progressing     Problem: Respiratory - Adult  Goal: Achieves optimal ventilation and oxygenation  Outcome: Progressing     Problem: Skin/Tissue Integrity - Adult  Goal: Skin integrity remains intact  Outcome: Progressing

## 2024-10-06 NOTE — DISCHARGE INSTR - COC
Fair    Recommended Labs or Other Treatments After Discharge:     Physician Certification: I certify the above information and transfer of Frank Lisa  is necessary for the continuing treatment of the diagnosis listed and that he requires Home Care for less 30 days.     Update Admission H&P: No change in H&P    PHYSICIAN SIGNATURE:  Electronically signed by Jaziel Cerda MD on 10/6/24 at 8:16 AM EDT

## 2024-10-06 NOTE — DISCHARGE INSTR - DIET
Good nutrition is important when healing from an illness, injury, or surgery.  Follow any nutrition recommendations given to you during your hospital stay.   If you were given an oral nutrition supplement while in the hospital, continue to take this supplement at home.  You can take it with meals, in-between meals, and/or before bedtime. These supplements can be purchased at most local grocery stores, pharmacies, and chain [employer]stores.   If you have any questions about your diet or nutrition, call the hospital and ask for the dietitian.  General diet. No restrictions

## 2024-10-06 NOTE — DISCHARGE SUMMARY
TISSUES: The bone marrow signal intensity appears normal. The soft tissues demonstrate no acute abnormality.     No acute intracranial infarction or hemorrhage. Chronic and senescent changes as detailed.     CT Head W/O Contrast    Result Date: 10/4/2024  EXAMINATION: CT OF THE HEAD WITHOUT CONTRAST  10/4/2024 8:57 pm TECHNIQUE: CT of the head was performed without the administration of intravenous contrast. Automated exposure control, iterative reconstruction, and/or weight based adjustment of the mA/kV was utilized to reduce the radiation dose to as low as reasonably achievable. COMPARISON: 8/10/2024 HISTORY: ORDERING SYSTEM PROVIDED HISTORY: confusion TECHNOLOGIST PROVIDED HISTORY: Confusion Decision Support Exception - unselect if not a suspected or confirmed emergency medical condition->Emergency Medical Condition (MA) Reason for Exam: confusion Additional signs and symptoms: recent surgery, AMS 64-year-old male with confusion, altered mental status; recent surgery FINDINGS: BRAIN/VENTRICLES: Bilateral thalamus lacunar infarcts, stable.  Stable mild chronic small vessel ischemic white matter disease.  Atherosclerotic calcification of the parasellar carotid arteries.  Foramen magnum and 4th ventricle appear patent.  Ventricles normal in size and midline in position. No abnormal extra-axial fluid collections.  No acute intracranial hemorrhage, mass, mass effect, or midline shift.  Stable mild atrophy. ORBITS: The visualized portion of the orbits demonstrate no acute abnormality. SINUSES: Mucous retention cyst in the lateral left maxillary sinus on image 21, series 3.  The remainder of the visualized paranasal sinuses and mastoid air cells demonstrate no acute abnormality. SOFT TISSUES/SKULL:  No acute abnormality of the visualized skull or soft tissues.     1. Lateral left maxillary sinus mucous retention cyst. 2. Bilateral thalamus lacunar infarcts.  Stable mild chronic small vessel ischemic white matter

## 2024-10-06 NOTE — PROGRESS NOTES
[facility] Encounter Date/Time: 10/4/2024 2004    Hospital Account: [id number]    MRN: [id number]    Patient: Alhaji Lisa    Contact Serial #: [id number]      ENCOUNTER          Patient Class: I Private Enc?  No Unit RM BD: STCZ MED RADHA    Hospital Service: MED   Encounter DX: Altered mental status, u*   ADM Provider: Kathi Russo MD   Procedure:     ATT Provider: Kathi Russo MD   REF Provider:        Admission DX: Altered mental status, unspecified altered mental status type, Pneumonia of right lower lobe due to infectious organism, AMS (altered mental status) and DX codes: R41.82, J18.9, R41.82      PATIENT             Name: Alhaji Lisa : 1960 (64 yrs)   Address: [address] [place] [address] Sex: Male   City: [place] [address]         Marital Status: Single   Employer: RETIRED         Caodaism: [employer]beterian   Primary Care Provider: Lopez Rosario PA         Primary Phone: [phone]   EMERGENCY CONTACT   Name Home Phone Work Phone Mobile Phone Relationship Lgl DAWIT Andrews [phone] Ex-Spouse No         GUARANTOR            Guarantor: Alhaji Lisa     : 1960   Address: [address] [place] [address] Sex: [place] [address]     Relation to Patient: Self       Home Phone: [phone]   Guarantor ID: [id number]       Work Phone:     Guarantor Employer: RETIRED         Status: RETIRED      COVERAGE        PRIMARY INSURANCE   Payor: [facility]   Payor Address: [place] [address]       Group Number: [id number] Insurance Type: INDEMNITY   Subscriber Name: YASIRALHAJI ARACELIS Subscriber : 1960   Subscriber ID: [id number] Pat. Rel. to Sub: Self   SECONDARY INSURANCE   Payor:   Plan:     Payor Address:  ,           Group Number:   Insurance Type:     Subscriber Name:   Subscriber :     Subscriber ID:   Pat. Rel. to Sub:             CSN: [id number]      Medication 
     [facility] Internal Medicine  Marcelo Sutton MD; Parmjit Parry MD; Jaziel Cerda MD; MD Jessika Diego MD; Alexia Tobar MD    [facility] Internal Medicine   IN-PATIENT SERVICE   [facility]    HISTORY AND PHYSICAL EXAMINATION            Date:   10/6/2024  Patient name:  Frank Lisa  Date of admission:  10/4/2024  8:04 PM  MRN:   [id number]  Account:  [id number]  YOB: 1960  PCP:    Lopez Rosario PA  Room:   2056/2056-01  Code Status:    Full Code    Chief Complaint:     Chief Complaint   Patient presents with    Altered Mental Status     Had back surgery on 10/2/24  3 days ago       History Obtained From:     Patient medical record and nursing staff    History of Present Illness:     Frank Lisa is a 64 y.o. Non- / non  male who presents with Altered Mental Status (Had back surgery on 10/2/24  3 days ago)   and is admitted to the hospital for the management of AMS (altered mental status).    Frank Lisa is a 64 y.o. Non- / non  male who presents with Altered Mental Status (Had back surgery on 10/2/24  3 days ago)   and is admitted to the hospital for the management of AMS (altered mental status).     Patient brought in by family with concerns for altered mentation.  Patient has a significant history of hypertension, hyperlipidemia, alcoholic cirrhosis, and COPD.     Patient's ex-wife went to check on patient and found that he was acting more confused.  Upon assessment patient is confused and I am unable to maintain a train of thought.  Even patient has stated \"I do not know why I am confused right now\". Patient underwent a kyphoplasty with Dr. Cueva on 10/02, upon arrival to ED patient had a scopolamine patch on and was taking Percocet postop for pain, underlying confusion could be due to polypharmacy.     Patient's urine is negative for any urinary tract infection.  Patient does not have any 
    [facility] Internal Medicine  Marcelo Sutton MD; Parmjit Parry MD; Jaziel Cerda MD; MD Jessika Diego MD; Alexia Tobar MD  [facility] Internal Medicine   IN-PATIENT SERVICE  [facility]                 Date:   10/5/2024  Patientname:  Frank Lisa  Date of admission:  10/4/2024  8:04 PM  MRN:   [id number]  Account:  [id number]  YOB: 1960  PCP:    Lopez Rosario PA  Room:   2056/2056-01  Code Status:    Full Code      Chief Complaint:     Chief Complaint   Patient presents with    Altered Mental Status     Had back surgery on 10/2/24  3 days ago       History of Present Illness:     Frank Lisa is a 64 y.o. Non- / non  male who presents with Altered Mental Status (Had back surgery on 10/2/24  3 days ago)   and is admitted to the hospital for the management of AMS (altered mental status).    Patient brought in by family with concerns for altered mentation.  Patient has a significant history of hypertension, hyperlipidemia, alcoholic cirrhosis, and COPD.    Patient's ex-wife went to check on patient and found that he was acting more confused.  Upon assessment patient is confused and I am unable to maintain a train of thought.  Even patient has stated \"I do not know why I am confused right now\". Patient underwent a kyphoplasty with Dr. Cueva on 10/02, upon arrival to ED patient had a scopolamine patch on and was taking Percocet postop for pain, underlying confusion could be due to polypharmacy.    Patient's urine is negative for any urinary tract infection.  Patient does not have any leukocytosis.  Head CT was completed over the patient that shows lateral left maxillary sinus mucous retention cyst. Bilateral thalamus lacunar infarcts.  Stable mild chronic small vessel ischemic white matter disease.  Atherosclerotic calcification of the vasculature.  Stable mild atrophy. No acute intracranial hemorrhage or midline shift.    Chest 
Discharge instructions reviewed with the patient. All questions answered. Patient was walked to the front of the hospital without issue.  
Dr Gunter notified of new consult.  
Old lacunar infarct  No new cva  Asa statin  No need for neuro consult    Jaziel Cerda MD  10/5/2024    
RN made Dr Cerda aware of lacunar thalamus infarct result on CT head and no mention of such infact in previous CT or MRI scans. Physician will review. NIHSS score of 2 performed for baseline.  
RN spoke with the patient's ex-wife who confirmed that the patient has never had a stroke in the past and stated that the patient was acting like he was drunk but hadn't touched alcohol since July.  
Diagnosis: Impaired self-care status  Prognosis: Fair  Decision Making: Medium Complexity  Discharge Recommendations: Patient would benefit from continued therapy after discharge    Activity Tolerance  Activity Tolerance: Patient limited by fatigue, Treatment limited secondary to decreased cognition    Safety Devices  Type of Devices: All fall risk precautions in place, Bed alarm in place, Call light within reach, Gait belt, Patient at risk for falls, Left in bed, Nurse notified    Patient Education  Patient Education  Education Given To: Patient  Education Provided: Role of Therapy, Plan of Care, Transfer Training, Mobility Training  Education Method: Verbal, Demonstration  Barriers to Learning: Cognition, Hearing  Education Outcome: Verbalized understanding, Continued education needed    Functional Outcome Measures  AM-PAC Daily Activity - Inpatient   How much help is needed for putting on and taking off regular lower body clothing?: A Little  How much help is needed for bathing (which includes washing, rinsing, drying)?: A Little  How much help is needed for toileting (which includes using toilet, bedpan, or urinal)?: A Little  How much help is needed for putting on and taking off regular upper body clothing?: A Little  How much help is needed for taking care of personal grooming?: A Little  How much help for eating meals?: A Little  [facility] Inpatient Daily Activity Raw Score: 18  AM-PAC Inpatient ADL T-Scale Score : 38.66  ADL Inpatient CMS 0-100% Score: 46.65  ADL Inpatient CMS G-Code Modifier : CK    Goals  Short Term Goals  Time Frame for Short Term Goals: By discharge  Short Term Goal 1: Pt will complete LB bathing/dressing/grooming with SUP, fair safety, and use of AE/DME/Modified techniques as needed  Short Term Goal 2: Pt will complete functional transfers/mobility with SUP, fair safety, and use of least restrictive device  Short Term Goal 3: Pt will actively participate in 15+ minutes of therapeutic 
2: Contact guard assistance  Quality of Gait 2: Mildly unsteady; no LOB; intermittnet use of external supports for steadying (Furniture/wall)  Gait Deviations: Deviated path;Decreased step length;Decreased step height  Distance: 20'  Stairs/Curb  Stairs?: Yes  Stairs  # Steps : 3  Stairs Height: 4\"  Rails:  (BUE on counter top)  Device: No Device  Assistance: Contact guard assistance     Balance  Posture: Fair  Sitting - Static: Good  Sitting - Dynamic: Fair;+  Standing - Static: Fair (RW)  Standing - Dynamic: Fair;- (RW)  Exercise Treatment: Attempted performance of 5xSTS however cues needed throughout for technique. pt with LOB falling back into chair, and demonstraiting worsening biomechanics/technique with repetition. Pt completes in >20 seconds and is certainly a HIGH fall risk at this time.       AM-PAC - Mobility    AM-PAC Basic Mobility - Inpatient   How much help is needed turning from your back to your side while in a flat bed without using bedrails?: A Little  How much help is needed moving from lying on your back to sitting on the side of a flat bed without using bedrails?: A Little  How much help is needed moving to and from a bed to a chair?: A Little  How much help is needed standing up from a chair using your arms?: A Little  How much help is needed walking in hospital room?: A Little  How much help is needed climbing 3-5 steps with a railing?: A Little  [facility] Inpatient Mobility Raw Score : 18  AM-PAC Inpatient T-Scale Score : 43.63  Mobility Inpatient CMS 0-100% Score: 46.58  Mobility Inpatient CMS G-Code Modifier : CK    Goals  Short Term Goals  Short Term Goal 1: Pt to demo Mod I transfers with RW  Short Term Goal 2: Pt to ambulate 150' with RW and supervision  Short Term Goal 3: Pt to negotiate 3-5 steps with left rail support and SBA  Short Term Goal 4: pt to complete 5xSTS in <15 seconds (without LOB, without Assistance, without using UEs) to demo dec risk for falls  Patient Goals   Patient 
some errors including those of syntax and sound a- like substitutions which may escape proofreading.  In such instances, actual meaning can be extrapolated by contextual derivation.

## 2024-10-07 ENCOUNTER — CARE COORDINATION (OUTPATIENT)
Dept: CARE COORDINATION | Age: 64
End: 2024-10-07

## 2024-10-07 NOTE — CARE COORDINATION
Care Transitions Note    Initial Call - Call within 2 business days of discharge: Yes    Attempted to reach patient for transitions of care follow up. Unable to reach patient.    Outreach Attempts:   HIPAA compliant voicemail left for patient.     Patient: Frank Lisa    Patient : 1960   MRN: [id number]    Reason for Admission: AMS  Discharge Date: 10/6/24  RURS: Readmission Risk Score: 42.2    Last Discharge Facility       Date Complaint Diagnosis Description Type Department Provider    10/4/24 Altered Mental Status Pneumonia of right lower lobe due to infectious organism ... ED to Hosp-Admission (Discharged) (ADMITTED) [facility] Kathi Dong MD; Kenia Casillas..          # 1 attempt-Attempted initial 24 hour hospital follow up call. Left a Hipaa compliant message with name and call back information. Requested return call to [phone].         Was this an external facility discharge? No    Follow Up Appointment:   Patient does not have a follow up appointment scheduled at time of call.     Future Appointments         Provider Specialty Dept Phone    10/8/2024 12:45 PM [facility] RM 1 Pre-Admission Testing [phone]    10/15/2024 2:20 PM Júnior Cueva MD Orthopedic Surgery [phone]    2024 1:40 PM Júnior Cueva MD Orthopedic Surgery [phone]    2025 1:40 PM Edinson Griffith MD Urology [phone]            Plan for follow-up on next business day.  2nd attempt 24 hr HFU call     Samina Mendiola RN

## 2024-10-07 NOTE — H&P
failure, unspecified (HCC) 04/21/2022    Muscle weakness (generalized) 07/28/2016    Other abnormalities of gait and mobility 07/28/2016    AMS (altered mental status) 10/04/2024    T12 compression fracture ([facility]) 10/02/2024    Osteoporosis with current pathological fracture 07/24/2024    Acute midline thoracic back pain 07/24/2024    Closed fracture of third thoracic vertebra ([facility]) 07/20/2024    Small bowel obstruction (HCC) 07/10/2024    Acute pancreatitis without infection or necrosis 06/25/2024    Dizziness 05/29/2024    Syncope 05/23/2024    Other microscopic hematuria 05/23/2024    Wrist arthritis 04/25/2024    Pain in joint involving right ankle and foot 04/25/2024    High carcinoembryonic antigen (CEA) 03/27/2024    Right lower quadrant abdominal pain 01/04/2024    Nausea 01/04/2024    Elevated CEA 01/04/2024    Abnormal abdominal CT scan 01/04/2024    Generalized weakness 01/04/2024    Intractable nausea and vomiting 01/02/2024    Neck pain, chronic 11/28/2023    Peripheral polyneuropathy 11/28/2023    Ulnar neuropathy of both upper extremities 11/28/2023    Status post reversal of ileostomy 10/10/2023    Coffee ground emesis 08/02/2023    Substance abuse ([facility]) 08/02/2023    History of abdominal surgery 08/02/2023    Ileostomy in place ([facility]) 07/31/2023    Peristomal dermatitis associated with moisture 07/31/2023    Cellulitis 07/31/2023    Moderate malnutrition ([facility]) 07/01/2023    ROBYN (acute kidney injury) ([facility]) 06/30/2023    Delirium due to another medical condition 04/14/2023    Electrolyte imbalance 04/12/2023    Hyponatremia 04/12/2023    Pain of upper abdomen 04/12/2023    Hemorrhage of rectum and anus 03/25/2023    Mastoid disorder, bilateral 12/31/2022    Acute on chronic anemia 04/13/2022    Acute kidney injury (HCC) 04/13/2022    Low hemoglobin 12/20/2021    Anemia 12/20/2021    Hypotension 12/20/2021    Former smoker, 50+ pack years, quit 2016 12/20/2021    HLD (hyperlipidemia) 12/20/2021

## 2024-10-08 ENCOUNTER — HOSPITAL ENCOUNTER (OUTPATIENT)
Dept: PREADMISSION TESTING | Age: 64
Discharge: HOME | End: 2024-10-12
Payer: COMMERCIAL

## 2024-10-08 ENCOUNTER — CARE COORDINATION (OUTPATIENT)
Dept: CARE COORDINATION | Age: 64
End: 2024-10-08

## 2024-10-08 ENCOUNTER — TELEPHONE (OUTPATIENT)
Dept: ORTHOPEDIC SURGERY | Age: 64
End: 2024-10-08

## 2024-10-08 VITALS
HEART RATE: 93 BPM | WEIGHT: 175 LBS | HEIGHT: 70 IN | DIASTOLIC BLOOD PRESSURE: 71 MMHG | BODY MASS INDEX: 25.05 KG/M2 | TEMPERATURE: 98.4 F | RESPIRATION RATE: 18 BRPM | OXYGEN SATURATION: 99 % | SYSTOLIC BLOOD PRESSURE: 153 MMHG

## 2024-10-08 DIAGNOSIS — G89.29 NECK PAIN, CHRONIC: Primary | ICD-10-CM

## 2024-10-08 DIAGNOSIS — M48.02 FORAMINAL STENOSIS OF CERVICAL REGION: ICD-10-CM

## 2024-10-08 DIAGNOSIS — R41.82 ALTERED MENTAL STATUS, UNSPECIFIED ALTERED MENTAL STATUS TYPE: Primary | ICD-10-CM

## 2024-10-08 DIAGNOSIS — M54.2 NECK PAIN, CHRONIC: Primary | ICD-10-CM

## 2024-10-08 LAB
ANION GAP SERPL CALCULATED.3IONS-SCNC: 7 MMOL/L (ref 9–16)
BASOPHILS # BLD: 0 K/UL (ref 0–0.2)
BASOPHILS NFR BLD: 0 % (ref 0–2)
BUN SERPL-MCNC: 9 MG/DL (ref 8–23)
CALCIUM SERPL-MCNC: 8.6 MG/DL (ref 8.6–10.4)
CHLORIDE SERPL-SCNC: 102 MMOL/L (ref 98–107)
CO2 SERPL-SCNC: 27 MMOL/L (ref 20–31)
CREAT SERPL-MCNC: 1 MG/DL (ref 0.7–1.2)
EKG ATRIAL RATE: 74 BPM
EKG P AXIS: 65 DEGREES
EKG P-R INTERVAL: 148 MS
EKG Q-T INTERVAL: 410 MS
EKG QRS DURATION: 82 MS
EKG QTC CALCULATION (BAZETT): 455 MS
EKG R AXIS: 22 DEGREES
EKG T AXIS: 0 DEGREES
EKG VENTRICULAR RATE: 74 BPM
EOSINOPHIL # BLD: 0.47 K/UL (ref 0–0.4)
EOSINOPHILS RELATIVE PERCENT: 5 % (ref 0–4)
ERYTHROCYTE [DISTWIDTH] IN BLOOD BY AUTOMATED COUNT: 14.5 % (ref 11.5–14.9)
GFR, ESTIMATED: 84 ML/MIN/1.73M2
GLUCOSE SERPL-MCNC: 108 MG/DL (ref 74–99)
HCT VFR BLD AUTO: 30.8 % (ref 41–53)
HGB BLD-MCNC: 10.2 G/DL (ref 13.5–17.5)
LYMPHOCYTES NFR BLD: 1.21 K/UL (ref 1–4.8)
LYMPHOCYTES RELATIVE PERCENT: 13 % (ref 24–44)
MCH RBC QN AUTO: 31 PG (ref 26–34)
MCHC RBC AUTO-ENTMCNC: 33.3 G/DL (ref 31–37)
MCV RBC AUTO: 93 FL (ref 80–100)
MONOCYTES NFR BLD: 1.95 K/UL (ref 0.1–1.3)
MONOCYTES NFR BLD: 21 % (ref 1–7)
MORPHOLOGY: NORMAL
NEUTROPHILS NFR BLD: 61 % (ref 36–66)
NEUTS SEG NFR BLD: 5.67 K/UL (ref 1.3–9.1)
PLATELET # BLD AUTO: 162 K/UL (ref 150–450)
PMV BLD AUTO: 8 FL (ref 6–12)
POTASSIUM SERPL-SCNC: 4 MMOL/L (ref 3.7–5.3)
RBC # BLD AUTO: 3.31 M/UL (ref 4.5–5.9)
SODIUM SERPL-SCNC: 136 MMOL/L (ref 136–145)
WBC OTHER # BLD: 9.3 K/UL (ref 3.5–11)

## 2024-10-08 PROCEDURE — 1111F DSCHRG MED/CURRENT MED MERGE: CPT

## 2024-10-08 PROCEDURE — 85025 COMPLETE CBC W/AUTO DIFF WBC: CPT

## 2024-10-08 PROCEDURE — 36415 COLL VENOUS BLD VENIPUNCTURE: CPT

## 2024-10-08 PROCEDURE — 80048 BASIC METABOLIC PNL TOTAL CA: CPT

## 2024-10-08 PROCEDURE — APPSS45 APP SPLIT SHARED TIME 31-45 MINUTES: Performed by: NURSE PRACTITIONER

## 2024-10-08 ASSESSMENT — ENCOUNTER SYMPTOMS
NAUSEA: 0
SINUS PRESSURE: 0
SHORTNESS OF BREATH: 0
BACK PAIN: 1
ABDOMINAL PAIN: 0

## 2024-10-08 NOTE — DISCHARGE INSTRUCTIONS
Pre-op Instructions For Out-Patient Surgery    Medication Instructions:  Please stop herbs and any supplements now (includes vitamins and minerals).    Please contact your surgeon and prescribing physician for pre-op instructions for any blood thinners. Stop Aspirin as directed.     If you have inhalers/aerosol treatments at home, please use them the morning of your surgery and bring the inhalers with you to the hospital.    Please take the following medications the morning of your surgery with a sip of water:    Metoprolol, Pantoprazole     Surgery Instructions:  After midnight before surgery:  Do not eat or drink anything, including water, mints, gum, and hard candy.  You may brush your teeth without swallowing.  No smoking, chewing tobacco, or street drugs.    Please shower or bathe before surgery.  If you were given Surgical Scrub Chlorhexidine Gluconate Liquid (CHG), please shower the night before and the morning of your surgery following the detailed instructions you received during your pre-admission visit.     Please do not wear any cologne, lotion, powder, deodorant, jewelry, piercings, perfume, makeup, nail polish, hair accessories, or hair spray on the day of surgery.  Wear loose comfortable clothing.    Leave your valuables at home but bring a payment source for any after-surgery prescriptions you plan to fill at [facility].  Bring a storage case for any glasses/contacts.    An adult who is responsible for you MUST drive you home and should be with you for the first 24 hours after surgery.     If having out-patient knee and foot surgeries, please arrange for planned crutches, walker, or wheelchair before arriving to the hospital.    The Day of Surgery:  Arrive at [facility] Surgery Entrance at the time directed by your surgeon and check in at the desk.     If you have a living will or healthcare power of , please bring a copy.    You will be

## 2024-10-09 ENCOUNTER — OFFICE VISIT (OUTPATIENT)
Dept: PRIMARY CARE CLINIC | Age: 64
End: 2024-10-09

## 2024-10-09 VITALS
HEIGHT: 70 IN | WEIGHT: 184 LBS | BODY MASS INDEX: 26.34 KG/M2 | OXYGEN SATURATION: 98 % | SYSTOLIC BLOOD PRESSURE: 144 MMHG | HEART RATE: 76 BPM | DIASTOLIC BLOOD PRESSURE: 78 MMHG

## 2024-10-09 DIAGNOSIS — K56.609 SMALL BOWEL OBSTRUCTION (HCC): ICD-10-CM

## 2024-10-09 DIAGNOSIS — R41.0 DELIRIUM: ICD-10-CM

## 2024-10-09 DIAGNOSIS — N17.9 ACUTE RENAL FAILURE, UNSPECIFIED ACUTE RENAL FAILURE TYPE (HCC): ICD-10-CM

## 2024-10-09 DIAGNOSIS — K72.90 DECOMPENSATION OF CIRRHOSIS OF LIVER (HCC): ICD-10-CM

## 2024-10-09 DIAGNOSIS — Z91.81 AT HIGH RISK FOR FALLS: ICD-10-CM

## 2024-10-09 DIAGNOSIS — I10 ESSENTIAL HYPERTENSION: ICD-10-CM

## 2024-10-09 DIAGNOSIS — K74.60 DECOMPENSATION OF CIRRHOSIS OF LIVER (HCC): ICD-10-CM

## 2024-10-09 DIAGNOSIS — K70.30 ALCOHOLIC CIRRHOSIS, UNSPECIFIED WHETHER ASCITES PRESENT (HCC): ICD-10-CM

## 2024-10-09 DIAGNOSIS — Z09 HOSPITAL DISCHARGE FOLLOW-UP: Primary | ICD-10-CM

## 2024-10-09 SDOH — ECONOMIC STABILITY: FOOD INSECURITY: WITHIN THE PAST 12 MONTHS, THE FOOD YOU BOUGHT JUST DIDN'T LAST AND YOU DIDN'T HAVE MONEY TO GET MORE.: NEVER TRUE

## 2024-10-09 SDOH — ECONOMIC STABILITY: FOOD INSECURITY: WITHIN THE PAST 12 MONTHS, YOU WORRIED THAT YOUR FOOD WOULD RUN OUT BEFORE YOU GOT MONEY TO BUY MORE.: NEVER TRUE

## 2024-10-09 SDOH — ECONOMIC STABILITY: INCOME INSECURITY: HOW HARD IS IT FOR YOU TO PAY FOR THE VERY BASICS LIKE FOOD, HOUSING, MEDICAL CARE, AND HEATING?: NOT HARD AT ALL

## 2024-10-09 NOTE — PROGRESS NOTES
Post-Discharge Transitional Care  Follow Up      Frank Lisa   YOB: 1960    Date of Office Visit:  10/9/2024  Date of Hospital Admission: 10/4/24  Date of Hospital Discharge: 10/6/24  Risk of hospital readmission (high >=14%. Medium >=10%) :Readmission Risk Score: 42.2      Care management risk score Rising risk (score 2-5) and Complex Care (Scores >=6): No Risk Score On File     Non face to face  following discharge, date last encounter closed (first attempt may have been earlier): 10/08/2024    Call initiated 2 business days of discharge: Yes    ASSESSMENT/PLAN:   Hospital discharge follow-up  -     [place] DISCHARGE MEDS RECONCILED W/ CURRENT OUTPATIENT MED LIST  -     Handicap Placard MISC; Starting Wed 10/9/2024, Disp-1 each, R-0, Print  Essential hypertension  -     Handicap Placard MISC; Starting Wed 10/9/2024, Disp-1 each, R-0, Print  Alcoholic cirrhosis, unspecified whether ascites present (HCC)  -     Handicap Placard MISC; Starting Wed 10/9/2024, Disp-1 each, R-0, Print  -     Lamont Wilkins MD - General Surgery, [facility] [place], [place], Gastroenterology, [place]  Small bowel obstruction (HCC)  -     Handicap Placard MISC; Starting Wed 10/9/2024, Disp-1 each, R-0, Print  Decompensation of cirrhosis of liver (HCC)  -     Handicap Placard MISC; Starting Wed 10/9/2024, Disp-1 each, R-0, Print  -     [facility], [place]  Delirium  -     Handicap Placard MISC; Starting Wed 10/9/2024, Disp-1 each, R-0, Print  At high risk for falls  Acute renal failure, unspecified acute renal failure type (HCC)      Medical Decision Making: high complexity  No follow-ups on file.           Subjective:   HPI:  Follow up of Hospital problems/diagnosis(es): AMS and pneumonia.     Inpatient course: Discharge summary reviewed- see chart.Frank Lisa is a 64 y.o. male who was admitted

## 2024-10-09 NOTE — TELEPHONE ENCOUNTER
Called and left voicemail for patient to let him know Dr Cueva does not believe we can appeal the surgery and get it approved without sending him to physical therapy.    Script created for [facility] and needs to be signed.  
[facility] denied his 10/21 ACDF surgery due to no physical therapy documented.    We have the option to appeal but not P2P.    Would you like to try and appeal given he just had a kypho or do you want to order PT and have him come back into office after?  
Patient called back and I let him know what was going on.  He understood.  His home nurse from [facility] was there and stated she could get him started in home physical therapy through them.  Apparently if he goes to outpatient PT it would cause billing issues for [facility] so he will do home PT at this point through them.  
To get better and follow your care plan as instructed.

## 2024-10-10 ENCOUNTER — TELEPHONE (OUTPATIENT)
Dept: GASTROENTEROLOGY | Age: 64
End: 2024-10-10

## 2024-10-10 LAB — VIT B1 PYROPHOSHATE BLD-SCNC: 95 NMOL/L (ref 70–180)

## 2024-10-10 RX ORDER — LACTULOSE 10 G/15ML
30 SOLUTION ORAL 3 TIMES DAILY
Qty: 946 ML | Refills: 1 | Status: SHIPPED | OUTPATIENT
Start: 2024-10-10

## 2024-10-10 NOTE — TELEPHONE ENCOUNTER
Attempt 1 lvm for pt to schedule next new patient ov-kira  Attempt 2 letter sent-kira    Referred for alcoholic cirrhosis

## 2024-10-11 ENCOUNTER — HOSPITAL ENCOUNTER (OUTPATIENT)
Dept: CT IMAGING | Age: 64
Discharge: HOME | End: 2024-10-13
Attending: SURGERY
Payer: COMMERCIAL

## 2024-10-11 DIAGNOSIS — I10 ESSENTIAL HYPERTENSION: Primary | ICD-10-CM

## 2024-10-11 DIAGNOSIS — L76.82 OTHER POSTPROCEDURAL COMPLICATIONS OF SKIN AND SUBCUTANEOUS TISSUE: ICD-10-CM

## 2024-10-11 PROCEDURE — 74177 CT ABD & PELVIS W/CONTRAST: CPT

## 2024-10-11 PROCEDURE — 6360000004 HC RX CONTRAST MEDICATION: Performed by: SURGERY

## 2024-10-11 RX ORDER — IOPAMIDOL 755 MG/ML
75 INJECTION, SOLUTION INTRAVASCULAR
Status: COMPLETED | OUTPATIENT
Start: 2024-10-11 | End: 2024-10-11

## 2024-10-11 RX ADMIN — IOPAMIDOL 75 ML: 755 INJECTION, SOLUTION INTRAVENOUS at 14:12

## 2024-10-14 ENCOUNTER — TELEPHONE (OUTPATIENT)
Dept: PRIMARY CARE CLINIC | Age: 64
End: 2024-10-14

## 2024-10-14 ENCOUNTER — CARE COORDINATION (OUTPATIENT)
Dept: CARE COORDINATION | Age: 64
End: 2024-10-14

## 2024-10-14 DIAGNOSIS — M51.362 DEGENERATION OF INTERVERTEBRAL DISC OF LUMBAR REGION WITH DISCOGENIC BACK PAIN AND LOWER EXTREMITY PAIN: ICD-10-CM

## 2024-10-14 DIAGNOSIS — K70.30 ALCOHOLIC CIRRHOSIS, UNSPECIFIED WHETHER ASCITES PRESENT (HCC): Primary | ICD-10-CM

## 2024-10-14 NOTE — TELEPHONE ENCOUNTER
Patient called d/t [facility] not accepting the Handicap Placard letter. The letter is missing the ending date and/or signature. Writer is unable to edit the letter.     Patient can be reached at [phone] when a new letter has been completed.    Please advise.

## 2024-10-15 ENCOUNTER — HOSPITAL ENCOUNTER (OUTPATIENT)
Dept: MRI IMAGING | Age: 64
Discharge: HOME | End: 2024-10-17
Attending: ORTHOPAEDIC SURGERY
Payer: COMMERCIAL

## 2024-10-15 ENCOUNTER — OFFICE VISIT (OUTPATIENT)
Dept: ORTHOPEDIC SURGERY | Age: 64
End: 2024-10-15

## 2024-10-15 ENCOUNTER — HOSPITAL ENCOUNTER (EMERGENCY)
Age: 64
Discharge: HOME | End: 2024-10-15
Attending: STUDENT IN AN ORGANIZED HEALTH CARE EDUCATION/TRAINING PROGRAM
Payer: COMMERCIAL

## 2024-10-15 ENCOUNTER — CARE COORDINATION (OUTPATIENT)
Dept: CARE COORDINATION | Age: 64
End: 2024-10-15

## 2024-10-15 VITALS
TEMPERATURE: 97.9 F | RESPIRATION RATE: 16 BRPM | WEIGHT: 175 LBS | SYSTOLIC BLOOD PRESSURE: 148 MMHG | OXYGEN SATURATION: 100 % | BODY MASS INDEX: 25.05 KG/M2 | DIASTOLIC BLOOD PRESSURE: 74 MMHG | HEIGHT: 70 IN | HEART RATE: 94 BPM

## 2024-10-15 VITALS — RESPIRATION RATE: 16 BRPM | WEIGHT: 175 LBS | BODY MASS INDEX: 25.05 KG/M2 | HEIGHT: 70 IN

## 2024-10-15 DIAGNOSIS — S32.010A CLOSED COMPRESSION FRACTURE OF BODY OF L1 VERTEBRA (HCC): ICD-10-CM

## 2024-10-15 DIAGNOSIS — M54.50 ACUTE EXACERBATION OF CHRONIC LOW BACK PAIN: Primary | ICD-10-CM

## 2024-10-15 DIAGNOSIS — G89.29 ACUTE EXACERBATION OF CHRONIC LOW BACK PAIN: Primary | ICD-10-CM

## 2024-10-15 DIAGNOSIS — S22.038A OTHER CLOSED FRACTURE OF THIRD THORACIC VERTEBRA, INITIAL ENCOUNTER (HCC): ICD-10-CM

## 2024-10-15 DIAGNOSIS — Z98.890 S/P KYPHOPLASTY: Primary | ICD-10-CM

## 2024-10-15 DIAGNOSIS — S22.080A COMPRESSION FRACTURE OF T12 VERTEBRA, INITIAL ENCOUNTER (HCC): ICD-10-CM

## 2024-10-15 PROCEDURE — 99024 POSTOP FOLLOW-UP VISIT: CPT | Performed by: ORTHOPAEDIC SURGERY

## 2024-10-15 PROCEDURE — 6360000002 HC RX W HCPCS: Performed by: STUDENT IN AN ORGANIZED HEALTH CARE EDUCATION/TRAINING PROGRAM

## 2024-10-15 PROCEDURE — 72148 MRI LUMBAR SPINE W/O DYE: CPT

## 2024-10-15 PROCEDURE — 96372 THER/PROPH/DIAG INJ SC/IM: CPT

## 2024-10-15 PROCEDURE — 6370000000 HC RX 637 (ALT 250 FOR IP): Performed by: STUDENT IN AN ORGANIZED HEALTH CARE EDUCATION/TRAINING PROGRAM

## 2024-10-15 PROCEDURE — 99284 EMERGENCY DEPT VISIT MOD MDM: CPT

## 2024-10-15 RX ORDER — LIDOCAINE 4 G/G
1 PATCH TOPICAL ONCE
Status: DISCONTINUED | OUTPATIENT
Start: 2024-10-15 | End: 2024-10-15 | Stop reason: HOSPADM

## 2024-10-15 RX ORDER — HYDROCODONE BITARTRATE AND ACETAMINOPHEN 5; 325 MG/1; MG/1
1 TABLET ORAL ONCE
Status: COMPLETED | OUTPATIENT
Start: 2024-10-15 | End: 2024-10-15

## 2024-10-15 RX ORDER — METHOCARBAMOL 750 MG/1
750 TABLET, FILM COATED ORAL ONCE
Status: COMPLETED | OUTPATIENT
Start: 2024-10-15 | End: 2024-10-15

## 2024-10-15 RX ORDER — HYDROCODONE BITARTRATE AND ACETAMINOPHEN 5; 325 MG/1; MG/1
1 TABLET ORAL EVERY 6 HOURS PRN
Qty: 10 TABLET | Refills: 0 | Status: SHIPPED | OUTPATIENT
Start: 2024-10-15 | End: 2024-10-18

## 2024-10-15 RX ADMIN — HYDROCODONE BITARTRATE AND ACETAMINOPHEN 1 TABLET: 5; 325 TABLET ORAL at 07:04

## 2024-10-15 RX ADMIN — HYDROMORPHONE HYDROCHLORIDE 1 MG: 1 INJECTION, SOLUTION INTRAMUSCULAR; INTRAVENOUS; SUBCUTANEOUS at 06:04

## 2024-10-15 RX ADMIN — METHOCARBAMOL 750 MG: 750 TABLET ORAL at 06:03

## 2024-10-15 ASSESSMENT — PAIN DESCRIPTION - LOCATION
LOCATION: BACK
LOCATION: BACK

## 2024-10-15 ASSESSMENT — ENCOUNTER SYMPTOMS
SHORTNESS OF BREATH: 0
BACK PAIN: 1
CHEST TIGHTNESS: 0
DIARRHEA: 0
RHINORRHEA: 0
SORE THROAT: 0
EYE REDNESS: 0
ABDOMINAL PAIN: 0
NAUSEA: 0
VOMITING: 0
EYE DISCHARGE: 0

## 2024-10-15 ASSESSMENT — PAIN DESCRIPTION - DESCRIPTORS
DESCRIPTORS: ACHING
DESCRIPTORS: ACHING

## 2024-10-15 ASSESSMENT — PAIN DESCRIPTION - FREQUENCY: FREQUENCY: CONTINUOUS

## 2024-10-15 ASSESSMENT — PAIN SCALES - GENERAL
PAINLEVEL_OUTOF10: 10
PAINLEVEL_OUTOF10: 10

## 2024-10-15 ASSESSMENT — PAIN DESCRIPTION - ORIENTATION
ORIENTATION: LOWER
ORIENTATION: LOWER

## 2024-10-15 ASSESSMENT — PAIN - FUNCTIONAL ASSESSMENT: PAIN_FUNCTIONAL_ASSESSMENT: 0-10

## 2024-10-15 NOTE — ED PROVIDER NOTES
GERD (gastroesophageal reflux disease) K21.9    Cervical radicular pain M54.12    Hypomagnesemia E83.42    Chronic viral hepatitis B without delta agent and without coma (HCC) B18.1    Calculus of gallbladder without cholecystitis K80.20    Hep C w/o coma, chronic (HCC) B18.2    Fatty liver K76.0    Psychophysiologic insomnia F51.04    Cirrhosis (HCC) K74.60    Decompensation of cirrhosis of liver (HCC) K72.90, K74.60    Vertebrogenic low back pain M54.51    DDD (degenerative disc disease), lumbar M51.369    Depression F32.A    Tubular adenoma of colon D12.6    History of colon polyps Z86.0100    Gynecomastia, male N62    Lumbar disc herniation M51.26    Tinnitus H93.19    Eustachian tube dysfunction H69.90    Ganglion cyst M67.40    Carpal tunnel syndrome of right wrist G56.01    History of hepatitis C Z86.19    Vitamin D deficiency E55.9    Pure hypercholesterolemia E78.00    Hypokalemia E87.6    Essential hypertension I10    Recurrent major depressive disorder in partial remission (HCC) F33.41    S/P epidural steroid injection Z92.241    Elevated LFTs R79.89    Seasonal allergies J30.2    Lumbar facet arthropathy M47.816    Cervical facet syndrome M47.812    Thrombocytopenia (HCC) D69.6    Hepatitis C virus infection resolved after antiviral drug therapy Z86.19    Alcohol abuse F10.10    Altered mental status R41.82    Hypocalcemia E83.51    Hypophosphatemia E83.39    Malignant neoplasm of lower third of esophagus (HCC) C15.5    COVID-19 U07.1    Anxiety F41.9    Current smoker F17.200    Severe comorbid illness R69    Multifactorial gait disorder R26.89    Abnormal findings on diagnostic imaging of spine R93.7    Foraminal stenosis of cervical region M48.02    Spinal stenosis of lumbar region with neurogenic claudication M48.062    Low hemoglobin D64.9    Anemia D64.9    Hypotension I95.9    Former smoker, 50+ pack years, quit 2016 Z87.891    HLD (hyperlipidemia) E78.5    Acute on chronic anemia D64.9    Acute

## 2024-10-15 NOTE — PROGRESS NOTES
Patient ID: Frank Lisa is a 64 y.o. male    Chief Compliant:  No chief complaint on file.       Diagnostic imaging:  AP lateral thoracic spine chronic T12 compression fracture T11 compression fracture status post kyphoplasty no new fractures seen.    AP lateral lumbar spine status post T11 kyphoplasty chronic T12 compression fracture subtle but I believe a new compression fracture of L1 slightly advanced spondylosis for age      Assessment and Plan:  1. S/P kyphoplasty    2. Compression fracture of T12 vertebra, initial encounter ([facility])    3. Other closed fracture of third thoracic vertebra, initial encounter ([facility])    4. Closed compression fracture of body of L1 vertebra ([facility])      S/p T3 kyphoplasty, 7/24/2024.    2 week post T12 kyphoplasty, 10/2/2024.    Low back pain     STAT MRI lumbar spine    Follow up after imaging    HPI:  This is a 64 y.o. male who presents to the clinic today for 2 week post T12 kyphoplasty, 10/2/2024.     Patient is scheduled for C5-6, C6-7 ACDF on 10/21/2024.     Patient was seen in ED today due to his immense pain. He woke up this morning about 4am in extreme pain.      Review of Systems   All other systems reviewed and are negative.      Past History:    Current Outpatient Medications:     HYDROcodone-acetaminophen (NORCO) 5-325 MG per tablet, Take 1 tablet by mouth every 6 hours as needed for Pain for up to 3 days. Intended supply: 3 days. Take lowest dose possible to manage pain Max Daily Amount: 4 tablets, Disp: 10 tablet, Rfl: 0    Handicap Placard MISC, by Does not apply route EXP: 10-, Disp: 1 each, Rfl: 0    lactulose (CHRONULAC) 10 GM/15ML solution, Take 45 mLs by mouth 3 times daily, Disp: 946 mL, Rfl: 1    Handicap Placard MISC, by Does not apply route, Disp: 1 each, Rfl: 0    atorvastatin (LIPITOR) 40 MG tablet, Take 1 tablet by mouth nightly, Disp: 30 tablet, Rfl: 3    aspirin 81 MG chewable tablet, Take 1 tablet by mouth daily, Disp: 30 tablet, Rfl: 3    vitamin

## 2024-10-15 NOTE — CARE COORDINATION
Care Transitions Note    Follow Up Call     Attempted to reach patient for transitions of care follow up.  Unable to reach patient.      Outreach Attempts:   HIPAA compliant voicemail left for patient.     Care Summary Note: 1st attempt-noted that patient was in the ED this am.     Follow Up Appointment:   Future Appointments         Provider Specialty Dept Phone    10/15/2024 4:30 PM (Arrive by 4:00 PM) STV MRI RM 1 (1.5T) Radiology [phone]    10/17/2024 9:30 AM Júnior Cueva MD Orthopedic Surgery [phone]    11/5/2024 1:40 PM Júnior Cueva MD Orthopedic Surgery [phone]    1/7/2025 1:40 PM Edinson Griffith MD Urology [phone]            Plan for follow-up on next business day.  based on severity of symptoms and risk factors. Plan for next call:  ED follow up call    Loretta Jack LPN

## 2024-10-16 ENCOUNTER — CARE COORDINATION (OUTPATIENT)
Dept: CARE COORDINATION | Age: 64
End: 2024-10-16

## 2024-10-16 NOTE — CARE COORDINATION
Care Transitions Note    Follow Up Call     Attempted to reach patient for transitions of care follow up.  Unable to reach patient.      Outreach Attempts:   Multiple attempts to contact patient at phone numbers on file.   HIPAA compliant voicemail left for patient.     Care Summary Note: 2nd attempt sub call/ED follow up call. Will end care transitions if no return call received. Noted that patient did follow up with Dr. Cueva yesterday and had a STAT MRI (results pending) and will be seeing him again tomorrow.    Follow Up Appointment:   Future Appointments         Provider Specialty Dept Phone    10/17/2024 9:30 AM Júnior Cueva MD Orthopedic Surgery [phone]    11/5/2024 1:40 PM Júnior Cueva MD Orthopedic Surgery [phone]    1/7/2025 1:40 PM Edinson Griffith MD Urology [phone]            No further follow-up call indicated based on severity of symptoms and risk factors.     Loretta Jack LPN

## 2024-10-17 ENCOUNTER — TELEPHONE (OUTPATIENT)
Dept: ADMINISTRATIVE | Age: 64
End: 2024-10-17

## 2024-10-17 ENCOUNTER — PREP FOR PROCEDURE (OUTPATIENT)
Dept: ORTHOPEDIC SURGERY | Age: 64
End: 2024-10-17

## 2024-10-17 ENCOUNTER — OFFICE VISIT (OUTPATIENT)
Dept: ORTHOPEDIC SURGERY | Age: 64
End: 2024-10-17
Payer: COMMERCIAL

## 2024-10-17 ENCOUNTER — OFFICE VISIT (OUTPATIENT)
Dept: GASTROENTEROLOGY | Age: 64
End: 2024-10-17
Payer: COMMERCIAL

## 2024-10-17 VITALS
HEART RATE: 105 BPM | HEIGHT: 70 IN | BODY MASS INDEX: 25.88 KG/M2 | RESPIRATION RATE: 19 BRPM | SYSTOLIC BLOOD PRESSURE: 155 MMHG | TEMPERATURE: 98.2 F | WEIGHT: 180.8 LBS | DIASTOLIC BLOOD PRESSURE: 72 MMHG

## 2024-10-17 VITALS — RESPIRATION RATE: 16 BRPM | BODY MASS INDEX: 25.05 KG/M2 | WEIGHT: 175 LBS | HEIGHT: 70 IN

## 2024-10-17 DIAGNOSIS — S32.010A CLOSED COMPRESSION FRACTURE OF BODY OF L1 VERTEBRA (HCC): Primary | ICD-10-CM

## 2024-10-17 DIAGNOSIS — Z98.890 S/P KYPHOPLASTY: ICD-10-CM

## 2024-10-17 DIAGNOSIS — K76.82 HEPATIC ENCEPHALOPATHY (HCC): ICD-10-CM

## 2024-10-17 DIAGNOSIS — K76.6 PORTAL HYPERTENSION (HCC): ICD-10-CM

## 2024-10-17 DIAGNOSIS — Z95.828 S/P TIPS (TRANSJUGULAR INTRAHEPATIC PORTOSYSTEMIC SHUNT): ICD-10-CM

## 2024-10-17 DIAGNOSIS — K70.31 ALCOHOLIC CIRRHOSIS OF LIVER WITH ASCITES (HCC): Primary | ICD-10-CM

## 2024-10-17 PROBLEM — R13.12 DYSPHAGIA, OROPHARYNGEAL PHASE: Status: ACTIVE | Noted: 2024-01-16

## 2024-10-17 PROBLEM — D50.0 IRON DEFICIENCY ANEMIA DUE TO CHRONIC BLOOD LOSS: Status: ACTIVE | Noted: 2024-01-16

## 2024-10-17 PROCEDURE — 3077F SYST BP >= 140 MM HG: CPT | Performed by: INTERNAL MEDICINE

## 2024-10-17 PROCEDURE — G2211 COMPLEX E/M VISIT ADD ON: HCPCS | Performed by: INTERNAL MEDICINE

## 2024-10-17 PROCEDURE — 1123F ACP DISCUSS/DSCN MKR DOCD: CPT | Performed by: ORTHOPAEDIC SURGERY

## 2024-10-17 PROCEDURE — 1123F ACP DISCUSS/DSCN MKR DOCD: CPT | Performed by: INTERNAL MEDICINE

## 2024-10-17 PROCEDURE — 99214 OFFICE O/P EST MOD 30 MIN: CPT | Performed by: INTERNAL MEDICINE

## 2024-10-17 PROCEDURE — 99213 OFFICE O/P EST LOW 20 MIN: CPT | Performed by: ORTHOPAEDIC SURGERY

## 2024-10-17 PROCEDURE — 3078F DIAST BP <80 MM HG: CPT | Performed by: INTERNAL MEDICINE

## 2024-10-17 ASSESSMENT — ENCOUNTER SYMPTOMS
WHEEZING: 0
VOICE CHANGE: 0
ANAL BLEEDING: 0
BACK PAIN: 1
TROUBLE SWALLOWING: 0
CHOKING: 0
BLOOD IN STOOL: 0
ABDOMINAL DISTENTION: 0
VOMITING: 0
ABDOMINAL PAIN: 1
NAUSEA: 0
COLOR CHANGE: 0
CONSTIPATION: 0
RECTAL PAIN: 0
DIARRHEA: 1
COUGH: 0
SORE THROAT: 0

## 2024-10-17 NOTE — PROGRESS NOTES
Patient ID: Frank Lisa is a 64 y.o. male    Chief Compliant:  No chief complaint on file.       Diagnostic imaging:  RI lumbar spine is reviewed I believe I have a discrepancy with the radiologist as to the exact level I am dictating that the patient had a T11 kyphoplasty most recently with a chronic T12 compression fracture sequelae healed patient has a new L1 compression fracture just over 30% in the midportion of the vertebral body      Assessment and Plan:  1. Closed compression fracture of body of L1 vertebra (HCC)    2. S/P kyphoplasty        S/p T3 kyphoplasty, 7/24/2024.    2 week post [id number], 10/2/2024.     Chronic T12 compression fracture    New L1 compression fracture    At this time, the patient is ready to proceed with L1 kyphoplasty.  Risks, benefits, possible complications, and alternatives to therapy were discussed with the patient.  Risks including no relief of pain, infection, neurovascular injury, bleeding, transfusion, need for future surgery, systemic and anesthetic complications were discussed.  The patient understands and wishes to proceed.      Follow up 2 weeks post op    HPI:  This is a 64 y.o. male who presents to the clinic today to review MRI results.     Patient reports his pain is worsening, and thinks this pain is more debilitating than his previous compression fractures.    Review of Systems   All other systems reviewed and are negative.      Past History:    Current Outpatient Medications:     HYDROcodone-acetaminophen (NORCO) 5-325 MG per tablet, Take 1 tablet by mouth every 6 hours as needed for Pain for up to 3 days. Intended supply: 3 days. Take lowest dose possible to manage pain Max Daily Amount: 4 tablets, Disp: 10 tablet, Rfl: 0    Handicap Placard MISC, by Does not apply route EXP: 10-, Disp: 1 each, Rfl: 0    lactulose (CHRONULAC) 10 GM/15ML solution, Take 45 mLs by mouth 3 times daily, Disp: 946 mL, Rfl: 1    Handicap Placard MISC, by Does not apply

## 2024-10-17 NOTE — PROGRESS NOTES
WITH APC TO SMALL BOWEL AVM AND RESOLUTION CLIP APPLIED TO GE JUNCTION performed by Dixon Olivia MD at [facility] ENDO    UPPER GASTROINTESTINAL ENDOSCOPY N/A 01/09/2024    EGD BIOPSY ESOPHAGOGASTRODUODENOSCOPY PEG TUBE INSERTION performed by Kole Guo MD at [facility] ENDO    UPPER GASTROINTESTINAL ENDOSCOPY N/A 05/10/2024    EGD BIOPSY WITH REMOVAL PERCUTANEOUS ENDOSCOPIC GASTROSTOMY TUBE performed by Kole Guo MD at [facility] ENDO    UPPER GASTROINTESTINAL ENDOSCOPY N/A 06/26/2024    ESOPHAGOGASTRODUODENOSCOPY performed by Kole Guo MD at [facility] ENDO    VENTRAL HERNIA REPAIR N/A 07/12/2024    OPEN HERNIA VENTRAL REPAIR  x2  (1 MIDLINE AND ONE RIGHT LOWER QUADRANT) WITH MESH performed by Kvng Waddell DO at [facility] OR    WISDOM TOOTH EXTRACTION         CURRENT MEDICATIONS:    Current Outpatient Medications:     HYDROcodone-acetaminophen (NORCO) 5-325 MG per tablet, Take 1 tablet by mouth every 6 hours as needed for Pain for up to 3 days. Intended supply: 3 days. Take lowest dose possible to manage pain Max Daily Amount: 4 tablets, Disp: 10 tablet, Rfl: 0    Handicap Placard MISC, by Does not apply route EXP: 10-, Disp: 1 each, Rfl: 0    lactulose (CHRONULAC) 10 GM/15ML solution, Take 45 mLs by mouth 3 times daily, Disp: 946 mL, Rfl: 1    Handicap Placard MISC, by Does not apply route, Disp: 1 each, Rfl: 0    atorvastatin (LIPITOR) 40 MG tablet, Take 1 tablet by mouth nightly, Disp: 30 tablet, Rfl: 3    aspirin 81 MG chewable tablet, Take 1 tablet by mouth daily, Disp: 30 tablet, Rfl: 3    vitamin D (ERGOCALCIFEROL) 1.25 MG (49629 UT) CAPS capsule, Take 1 capsule by mouth once a week, Disp: 12 capsule, Rfl: 1    magnesium oxide (MAG-OX) 400 MG tablet, Take 1 tablet by mouth daily, Disp: 30 tablet, Rfl: 1    potassium chloride (KLOR-CON M) 20 MEQ extended release tablet, Take 1 tablet by mouth daily, Disp: 7 tablet, Rfl: 0    ondansetron (ZOFRAN-ODT) 4 MG disintegrating tablet, Take 1 tablet by mouth every 8 hours

## 2024-10-17 NOTE — TELEPHONE ENCOUNTER
Nilam (Ex-Wife) is requesting a return call from Mehul to schedule Frank's procedure with Dr Cueva.  Patient was just seen this morning, 10/17.  Nilam states she schedules all his appointments as he does not hear well and would probably not answer his phone.    Thank you.

## 2024-10-18 ENCOUNTER — HOSPITAL ENCOUNTER (OUTPATIENT)
Dept: PREADMISSION TESTING | Age: 64
Discharge: HOME | End: 2024-10-22

## 2024-10-18 VITALS — HEIGHT: 70 IN | BODY MASS INDEX: 25.05 KG/M2 | WEIGHT: 175 LBS

## 2024-10-18 NOTE — PROGRESS NOTES
Pre-op Instructions For Out-Patient Surgery    Medication Instructions:  Please stop herbs and any supplements now (includes vitamins and minerals).    Please contact your surgeon and prescribing physician for pre-op instructions for any blood thinners. Aspirin     If you have inhalers/aerosol treatments at home, please use them the morning of your surgery and bring the inhalers with you to the hospital.    Please take the following medications the morning of your surgery with a sip of water:    Metoprolol and Pantoprazole     Surgery Instructions:  After midnight before surgery:  Do not eat or drink anything, including water, mints, gum, and hard candy.  You may brush your teeth without swallowing.  No smoking, chewing tobacco, or street drugs.    Please shower or bathe before surgery.  If you were given Surgical Scrub Chlorhexidine Gluconate Liquid (CHG), please shower the night before and the morning of your surgery following the detailed instructions you received during your pre-admission visit.    (Frank has soap from previous surgery he will use)   Please do not wear any cologne, lotion, powder, deodorant, jewelry, piercings, perfume, makeup, nail polish, hair accessories, or hair spray on the day of surgery.  Wear loose comfortable clothing.    Leave your valuables at home but bring a payment source for any after-surgery prescriptions you plan to fill at [facility].  Bring a storage case for any glasses/contacts.    An adult who is responsible for you MUST drive you home and should be with you for the first 24 hours after surgery.     If having out-patient knee and foot surgeries, please arrange for planned crutches, walker, or wheelchair before arriving to the hospital.    The Day of Surgery:  Arrive at [facility] Surgery Entrance at the time directed by your surgeon and check in at the desk. 9:45am    If you have a living will or healthcare power of

## 2024-10-21 ENCOUNTER — OFFICE VISIT (OUTPATIENT)
Age: 64
End: 2024-10-21
Payer: COMMERCIAL

## 2024-10-21 ENCOUNTER — HOSPITAL ENCOUNTER (OUTPATIENT)
Age: 64
Setting detail: SPECIMEN
Discharge: HOME | End: 2024-10-21

## 2024-10-21 VITALS
OXYGEN SATURATION: 98 % | SYSTOLIC BLOOD PRESSURE: 192 MMHG | HEART RATE: 88 BPM | DIASTOLIC BLOOD PRESSURE: 94 MMHG | HEIGHT: 68 IN | WEIGHT: 180 LBS | BODY MASS INDEX: 27.28 KG/M2

## 2024-10-21 DIAGNOSIS — I10 ESSENTIAL HYPERTENSION: ICD-10-CM

## 2024-10-21 DIAGNOSIS — I72.8 SPLENIC ARTERY ANEURYSM (HCC): Primary | ICD-10-CM

## 2024-10-21 DIAGNOSIS — K70.31 ALCOHOLIC CIRRHOSIS OF LIVER WITH ASCITES (HCC): ICD-10-CM

## 2024-10-21 LAB
AFP SERPL-MCNC: 3.2 UG/L
ALBUMIN SERPL-MCNC: 3.1 G/DL (ref 3.5–5.2)
ALBUMIN/GLOB SERPL: 1 {RATIO} (ref 1–2.5)
ALP SERPL-CCNC: 185 U/L (ref 40–129)
ALT SERPL-CCNC: 10 U/L (ref 10–50)
ANION GAP SERPL CALCULATED.3IONS-SCNC: 12 MMOL/L (ref 9–16)
AST SERPL-CCNC: 50 U/L (ref 10–50)
BILIRUB DIRECT SERPL-MCNC: 0.5 MG/DL (ref 0–0.2)
BILIRUB INDIRECT SERPL-MCNC: 0.4 MG/DL (ref 0–1)
BILIRUB SERPL-MCNC: 0.9 MG/DL (ref 0–1.2)
BUN SERPL-MCNC: 7 MG/DL (ref 8–23)
CALCIUM SERPL-MCNC: 8.9 MG/DL (ref 8.6–10.4)
CHLORIDE SERPL-SCNC: 105 MMOL/L (ref 98–107)
CO2 SERPL-SCNC: 25 MMOL/L (ref 20–31)
CREAT SERPL-MCNC: 0.9 MG/DL (ref 0.7–1.2)
CREAT SERPL-MCNC: 0.9 MG/DL (ref 0.7–1.2)
ERYTHROCYTE [DISTWIDTH] IN BLOOD BY AUTOMATED COUNT: 14.5 % (ref 11.8–14.4)
GFR, ESTIMATED: >90 ML/MIN/1.73M2
GFR, ESTIMATED: >90 ML/MIN/1.73M2
GLUCOSE SERPL-MCNC: 80 MG/DL (ref 74–99)
HCT VFR BLD AUTO: 28.9 % (ref 40.7–50.3)
HGB BLD-MCNC: 9.2 G/DL (ref 13–17)
INR PPP: 1.1
MCH RBC QN AUTO: 29.3 PG (ref 25.2–33.5)
MCHC RBC AUTO-ENTMCNC: 31.8 G/DL (ref 28.4–34.8)
MCV RBC AUTO: 92 FL (ref 82.6–102.9)
NRBC BLD-RTO: 0 PER 100 WBC
PLATELET # BLD AUTO: 176 K/UL (ref 138–453)
PMV BLD AUTO: 9.4 FL (ref 8.1–13.5)
POTASSIUM SERPL-SCNC: 3.4 MMOL/L (ref 3.7–5.3)
PROT SERPL-MCNC: 5.8 G/DL (ref 6.6–8.7)
PROTHROMBIN TIME: 14.1 SEC (ref 11.7–14.9)
RBC # BLD AUTO: 3.14 M/UL (ref 4.21–5.77)
SODIUM SERPL-SCNC: 142 MMOL/L (ref 136–145)
WBC OTHER # BLD: 6 K/UL (ref 3.5–11.3)

## 2024-10-21 PROCEDURE — 99205 OFFICE O/P NEW HI 60 MIN: CPT | Performed by: TRANSPLANT SURGERY

## 2024-10-21 PROCEDURE — 3077F SYST BP >= 140 MM HG: CPT | Performed by: TRANSPLANT SURGERY

## 2024-10-21 PROCEDURE — 3080F DIAST BP >= 90 MM HG: CPT | Performed by: TRANSPLANT SURGERY

## 2024-10-21 PROCEDURE — 1123F ACP DISCUSS/DSCN MKR DOCD: CPT | Performed by: TRANSPLANT SURGERY

## 2024-10-21 NOTE — PROGRESS NOTES
Hepatobiliary Surgery Faculty Note  Dr. Lamont Santos MD, FACS      Date of Visit: 10/21/2024   Chief Complaint:  Chief Complaint   Patient presents with    New Patient    Cirrhosis        Referring physician:   VASU Clemente     HPI:  Mr. Frank Lisa is a 64-year-old male with known cirrhosis likely due to ETOH use and chronic hepatitis C infection (treated with Harvoni). Manifestations of the patient's liver disease include portal hypertension, a history of recurrent ascites requiring TIPS placement, hepatic encephalopathy on lactulose, and esophageal varices requiring EGD. The patient's comorbidities include previous exploratory laparotomy and right hemicolectomy for perforated colon, temporary diverting colostomy and subsequent takedown of colostomy and primary anastomosis (2023), history of esophageal cancer status post chemoradiation therapy.     The patient is accompanied by his ex-wife for a surgical consultation regarding his cirrhosis. I reviewed his available records and noted the following pertinent findings related to the patient's chronic liver disease:   Cirrhosis with portal hypertension, s/p TIPS placement   MELD 3.0 score of 14 (based on lab test on 10/11/2024)   Asymptomatic splenic artery aneurysm, 2.8 cm in diameter   Atherosclerosis of the aorta and coronary arteries  PAST MEDICAL HISTORY:  Past Medical History:   Diagnosis Date    Abdominal pain     Abnormal EKG     Acute deep vein thrombosis (DVT) of brachial vein of right upper extremity (HCC) 01/07/2023    Also- Superficial venous thrombosis of the cephalic vein in the forearm    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 Mc, orthopedic, every 3-4 months, gets steroid injection    Lezama esophagus     Bleeding gastric varices 12/29/2022    Bleeds easily (HCC)     Bowel

## 2024-10-22 ENCOUNTER — ANESTHESIA EVENT (OUTPATIENT)
Dept: OPERATING ROOM | Age: 64
End: 2024-10-22
Payer: COMMERCIAL

## 2024-10-22 NOTE — PRE-PROCEDURE INSTRUCTIONS
Nothing to eat after midnight.   Are you taking any blood thinners? When was the last day?  Make sure to use Hibiclens prior to surgery.  Remove any jewelry and body piercings.  Do you wear glasses? If so, please bring a case to store them in.  Are you having any Covid symptoms?  Do you have any new rashes, infections, etc. that we should be aware of?  Do you have a ride home the day of surgery? It cannot be a cab or medical transportation.  Verify surgery time and what time to arrive at hospital.  NO ANSWER, VM LEFT TO ARRIVE AT 0530

## 2024-10-23 ENCOUNTER — HOSPITAL ENCOUNTER (OUTPATIENT)
Age: 64
Setting detail: OUTPATIENT SURGERY
Discharge: HOME | End: 2024-10-23
Attending: ORTHOPAEDIC SURGERY | Admitting: ORTHOPAEDIC SURGERY
Payer: COMMERCIAL

## 2024-10-23 ENCOUNTER — APPOINTMENT (OUTPATIENT)
Dept: GENERAL RADIOLOGY | Age: 64
End: 2024-10-23
Attending: ORTHOPAEDIC SURGERY
Payer: COMMERCIAL

## 2024-10-23 ENCOUNTER — ANESTHESIA (OUTPATIENT)
Dept: OPERATING ROOM | Age: 64
End: 2024-10-23
Payer: COMMERCIAL

## 2024-10-23 VITALS
SYSTOLIC BLOOD PRESSURE: 186 MMHG | WEIGHT: 175 LBS | RESPIRATION RATE: 20 BRPM | TEMPERATURE: 97.3 F | OXYGEN SATURATION: 96 % | DIASTOLIC BLOOD PRESSURE: 81 MMHG | HEART RATE: 84 BPM | HEIGHT: 70 IN | BODY MASS INDEX: 25.05 KG/M2

## 2024-10-23 DIAGNOSIS — S32.010G COMPRESSION FRACTURE OF L1 VERTEBRA WITH DELAYED HEALING, SUBSEQUENT ENCOUNTER: Primary | ICD-10-CM

## 2024-10-23 PROCEDURE — 3700000000 HC ANESTHESIA ATTENDED CARE: Performed by: ORTHOPAEDIC SURGERY

## 2024-10-23 PROCEDURE — C1894 INTRO/SHEATH, NON-LASER: HCPCS | Performed by: ORTHOPAEDIC SURGERY

## 2024-10-23 PROCEDURE — 6360000002 HC RX W HCPCS: Performed by: NURSE ANESTHETIST, CERTIFIED REGISTERED

## 2024-10-23 PROCEDURE — 7100000030 HC ASPR PHASE II RECOVERY - FIRST 15 MIN: Performed by: ORTHOPAEDIC SURGERY

## 2024-10-23 PROCEDURE — 7100000000 HC PACU RECOVERY - FIRST 15 MIN: Performed by: ORTHOPAEDIC SURGERY

## 2024-10-23 PROCEDURE — 7100000001 HC PACU RECOVERY - ADDTL 15 MIN: Performed by: ORTHOPAEDIC SURGERY

## 2024-10-23 PROCEDURE — 2500000003 HC RX 250 WO HCPCS: Performed by: NURSE ANESTHETIST, CERTIFIED REGISTERED

## 2024-10-23 PROCEDURE — 3600000012 HC SURGERY LEVEL 2 ADDTL 15MIN: Performed by: ORTHOPAEDIC SURGERY

## 2024-10-23 PROCEDURE — 2580000003 HC RX 258: Performed by: ANESTHESIOLOGY

## 2024-10-23 PROCEDURE — 7100000031 HC ASPR PHASE II RECOVERY - ADDTL 15 MIN: Performed by: ORTHOPAEDIC SURGERY

## 2024-10-23 PROCEDURE — 6370000000 HC RX 637 (ALT 250 FOR IP): Performed by: ANESTHESIOLOGY

## 2024-10-23 PROCEDURE — C1713 ANCHOR/SCREW BN/BN,TIS/BN: HCPCS | Performed by: ORTHOPAEDIC SURGERY

## 2024-10-23 PROCEDURE — 7100000010 HC PHASE II RECOVERY - FIRST 15 MIN: Performed by: ORTHOPAEDIC SURGERY

## 2024-10-23 PROCEDURE — 2500000003 HC RX 250 WO HCPCS: Performed by: ORTHOPAEDIC SURGERY

## 2024-10-23 PROCEDURE — 7100000011 HC PHASE II RECOVERY - ADDTL 15 MIN: Performed by: ORTHOPAEDIC SURGERY

## 2024-10-23 PROCEDURE — 6360000002 HC RX W HCPCS: Performed by: ANESTHESIOLOGY

## 2024-10-23 PROCEDURE — 3600000002 HC SURGERY LEVEL 2 BASE: Performed by: ORTHOPAEDIC SURGERY

## 2024-10-23 PROCEDURE — 6360000002 HC RX W HCPCS: Performed by: ORTHOPAEDIC SURGERY

## 2024-10-23 PROCEDURE — 3700000001 HC ADD 15 MINUTES (ANESTHESIA): Performed by: ORTHOPAEDIC SURGERY

## 2024-10-23 PROCEDURE — 2500000003 HC RX 250 WO HCPCS: Performed by: ANESTHESIOLOGY

## 2024-10-23 PROCEDURE — 2709999900 HC NON-CHARGEABLE SUPPLY: Performed by: ORTHOPAEDIC SURGERY

## 2024-10-23 DEVICE — CEMENT C01A KYPHX HV-R BONE CEMENT EN
Type: IMPLANTABLE DEVICE | Site: BACK | Status: FUNCTIONAL
Brand: KYPHON® HV-R® BONE CEMENT

## 2024-10-23 RX ORDER — LIDOCAINE HYDROCHLORIDE 10 MG/ML
1 INJECTION, SOLUTION EPIDURAL; INFILTRATION; INTRACAUDAL; PERINEURAL
Status: COMPLETED | OUTPATIENT
Start: 2024-10-23 | End: 2024-10-23

## 2024-10-23 RX ORDER — SODIUM CHLORIDE 0.9 % (FLUSH) 0.9 %
5-40 SYRINGE (ML) INJECTION PRN
Status: DISCONTINUED | OUTPATIENT
Start: 2024-10-23 | End: 2024-10-23 | Stop reason: HOSPADM

## 2024-10-23 RX ORDER — SODIUM CHLORIDE 0.9 % (FLUSH) 0.9 %
5-40 SYRINGE (ML) INJECTION EVERY 12 HOURS SCHEDULED
Status: DISCONTINUED | OUTPATIENT
Start: 2024-10-23 | End: 2024-10-23 | Stop reason: HOSPADM

## 2024-10-23 RX ORDER — SODIUM CHLORIDE, SODIUM LACTATE, POTASSIUM CHLORIDE, CALCIUM CHLORIDE 600; 310; 30; 20 MG/100ML; MG/100ML; MG/100ML; MG/100ML
INJECTION, SOLUTION INTRAVENOUS CONTINUOUS
Status: DISCONTINUED | OUTPATIENT
Start: 2024-10-23 | End: 2024-10-23 | Stop reason: HOSPADM

## 2024-10-23 RX ORDER — LIDOCAINE HYDROCHLORIDE 20 MG/ML
INJECTION, SOLUTION EPIDURAL; INFILTRATION; INTRACAUDAL; PERINEURAL
Status: DISCONTINUED | OUTPATIENT
Start: 2024-10-23 | End: 2024-10-23 | Stop reason: SDUPTHER

## 2024-10-23 RX ORDER — FENTANYL CITRATE 0.05 MG/ML
50 INJECTION, SOLUTION INTRAMUSCULAR; INTRAVENOUS EVERY 5 MIN PRN
Status: DISCONTINUED | OUTPATIENT
Start: 2024-10-23 | End: 2024-10-23 | Stop reason: HOSPADM

## 2024-10-23 RX ORDER — MIDAZOLAM HYDROCHLORIDE 1 MG/ML
INJECTION, SOLUTION INTRAMUSCULAR; INTRAVENOUS
Status: DISCONTINUED | OUTPATIENT
Start: 2024-10-23 | End: 2024-10-23 | Stop reason: SDUPTHER

## 2024-10-23 RX ORDER — HYDROCODONE BITARTRATE AND ACETAMINOPHEN 5; 325 MG/1; MG/1
1 TABLET ORAL
Status: DISCONTINUED | OUTPATIENT
Start: 2024-10-23 | End: 2024-10-23 | Stop reason: HOSPADM

## 2024-10-23 RX ORDER — OXYCODONE AND ACETAMINOPHEN 5; 325 MG/1; MG/1
1 TABLET ORAL EVERY 6 HOURS PRN
Qty: 20 TABLET | Refills: 0 | Status: SHIPPED | OUTPATIENT
Start: 2024-10-23 | End: 2024-10-28

## 2024-10-23 RX ORDER — ROCURONIUM BROMIDE 10 MG/ML
INJECTION, SOLUTION INTRAVENOUS
Status: DISCONTINUED | OUTPATIENT
Start: 2024-10-23 | End: 2024-10-23 | Stop reason: SDUPTHER

## 2024-10-23 RX ORDER — ONDANSETRON 2 MG/ML
4 INJECTION INTRAMUSCULAR; INTRAVENOUS
Status: DISCONTINUED | OUTPATIENT
Start: 2024-10-23 | End: 2024-10-23 | Stop reason: HOSPADM

## 2024-10-23 RX ORDER — GABAPENTIN 100 MG/1
100 CAPSULE ORAL ONCE
Status: COMPLETED | OUTPATIENT
Start: 2024-10-23 | End: 2024-10-23

## 2024-10-23 RX ORDER — DIPHENHYDRAMINE HYDROCHLORIDE 50 MG/ML
12.5 INJECTION INTRAMUSCULAR; INTRAVENOUS
Status: DISCONTINUED | OUTPATIENT
Start: 2024-10-23 | End: 2024-10-23 | Stop reason: HOSPADM

## 2024-10-23 RX ORDER — ONDANSETRON 2 MG/ML
INJECTION INTRAMUSCULAR; INTRAVENOUS
Status: DISCONTINUED | OUTPATIENT
Start: 2024-10-23 | End: 2024-10-23 | Stop reason: SDUPTHER

## 2024-10-23 RX ORDER — BUPIVACAINE HYDROCHLORIDE AND EPINEPHRINE 2.5; 5 MG/ML; UG/ML
INJECTION, SOLUTION EPIDURAL; INFILTRATION; INTRACAUDAL; PERINEURAL PRN
Status: DISCONTINUED | OUTPATIENT
Start: 2024-10-23 | End: 2024-10-23 | Stop reason: ALTCHOICE

## 2024-10-23 RX ORDER — FENTANYL CITRATE 50 UG/ML
INJECTION, SOLUTION INTRAMUSCULAR; INTRAVENOUS
Status: DISCONTINUED | OUTPATIENT
Start: 2024-10-23 | End: 2024-10-23 | Stop reason: SDUPTHER

## 2024-10-23 RX ORDER — SODIUM CHLORIDE 9 MG/ML
INJECTION, SOLUTION INTRAVENOUS PRN
Status: DISCONTINUED | OUTPATIENT
Start: 2024-10-23 | End: 2024-10-23 | Stop reason: HOSPADM

## 2024-10-23 RX ORDER — HYDRALAZINE HYDROCHLORIDE 20 MG/ML
10 INJECTION INTRAMUSCULAR; INTRAVENOUS EVERY 10 MIN PRN
Status: DISCONTINUED | OUTPATIENT
Start: 2024-10-23 | End: 2024-10-23 | Stop reason: HOSPADM

## 2024-10-23 RX ORDER — PROPOFOL 10 MG/ML
INJECTION, EMULSION INTRAVENOUS
Status: DISCONTINUED | OUTPATIENT
Start: 2024-10-23 | End: 2024-10-23 | Stop reason: SDUPTHER

## 2024-10-23 RX ADMIN — SODIUM CHLORIDE, POTASSIUM CHLORIDE, SODIUM LACTATE AND CALCIUM CHLORIDE: 600; 310; 30; 20 INJECTION, SOLUTION INTRAVENOUS at 07:20

## 2024-10-23 RX ADMIN — HYDROMORPHONE HYDROCHLORIDE 0.5 MG: 1 INJECTION, SOLUTION INTRAMUSCULAR; INTRAVENOUS; SUBCUTANEOUS at 08:33

## 2024-10-23 RX ADMIN — ONDANSETRON 4 MG: 2 INJECTION, SOLUTION INTRAMUSCULAR; INTRAVENOUS at 07:59

## 2024-10-23 RX ADMIN — LIDOCAINE HYDROCHLORIDE 100 MG: 20 INJECTION, SOLUTION EPIDURAL; INFILTRATION; INTRACAUDAL; PERINEURAL at 07:30

## 2024-10-23 RX ADMIN — GABAPENTIN 100 MG: 100 CAPSULE ORAL at 07:14

## 2024-10-23 RX ADMIN — SUGAMMADEX 160 MG: 100 INJECTION, SOLUTION INTRAVENOUS at 08:06

## 2024-10-23 RX ADMIN — FENTANYL CITRATE 100 MCG: 50 INJECTION INTRAMUSCULAR; INTRAVENOUS at 07:49

## 2024-10-23 RX ADMIN — Medication 2000 MG: at 07:38

## 2024-10-23 RX ADMIN — MIDAZOLAM 2 MG: 1 INJECTION INTRAMUSCULAR; INTRAVENOUS at 07:28

## 2024-10-23 RX ADMIN — PROPOFOL 200 MG: 10 INJECTION, EMULSION INTRAVENOUS at 07:30

## 2024-10-23 RX ADMIN — LIDOCAINE HYDROCHLORIDE 1 ML: 10 INJECTION, SOLUTION EPIDURAL; INFILTRATION; INTRACAUDAL; PERINEURAL at 07:14

## 2024-10-23 RX ADMIN — HYDROMORPHONE HYDROCHLORIDE 0.5 MG: 1 INJECTION, SOLUTION INTRAMUSCULAR; INTRAVENOUS; SUBCUTANEOUS at 08:29

## 2024-10-23 RX ADMIN — ROCURONIUM BROMIDE 50 MG: 10 INJECTION, SOLUTION INTRAVENOUS at 07:30

## 2024-10-23 ASSESSMENT — PAIN - FUNCTIONAL ASSESSMENT
PAIN_FUNCTIONAL_ASSESSMENT: 0-10
PAIN_FUNCTIONAL_ASSESSMENT: NONE - DENIES PAIN
PAIN_FUNCTIONAL_ASSESSMENT: 0-10
PAIN_FUNCTIONAL_ASSESSMENT: 0-10

## 2024-10-23 ASSESSMENT — PAIN DESCRIPTION - DESCRIPTORS
DESCRIPTORS: ACHING

## 2024-10-23 ASSESSMENT — ENCOUNTER SYMPTOMS
SHORTNESS OF BREATH: 0
COUGH: 1
SHORTNESS OF BREATH: 1
TROUBLE SWALLOWING: 0
ABDOMINAL PAIN: 1
SORE THROAT: 0

## 2024-10-23 ASSESSMENT — PAIN SCALES - WONG BAKER: WONGBAKER_NUMERICALRESPONSE: HURTS LITTLE MORE

## 2024-10-23 ASSESSMENT — PAIN SCALES - GENERAL: PAINLEVEL_OUTOF10: 10

## 2024-10-23 ASSESSMENT — PAIN DESCRIPTION - LOCATION: LOCATION: BACK

## 2024-10-23 NOTE — ANESTHESIA POSTPROCEDURE EVALUATION
Department of Anesthesiology  Postprocedure Note    Patient: Frank Lisa  MRN: [id number]  YOB: 1960  Date of evaluation: 10/23/2024    Procedure Summary       Date: 10/23/24 Room / Location: [address]    Anesthesia Start: 0726 Anesthesia Stop: 0816    Procedure: KYPHOPLASTY L1 (Back) Diagnosis:       Compression fracture of L1 lumbar vertebra (HCC)      (Compression fracture of L1 lumbar vertebra (HCC) [S32.010A])    Surgeons: Júnior Cueva MD Responsible Provider: Gail Bonilla MD    Anesthesia Type: general ASA Status: 3            Anesthesia Type: No value filed.    Yen Phase I: Yen Score: 9    Yen Phase II: Yen Score: 10    Anesthesia Post Evaluation    Comments: POST- ANESTHESIA EVALUATION       Pt Name: Frank Lisa  MRN: [id number]  YOB: 1960  Date of evaluation: 10/23/2024  Time:  1:33 PM      BP (!) 168/73  Pulse 84   Temp 97.3 °F (36.3 °C) (Infrared)   Resp 20   Ht 1.778 m (5' 10\")   Wt 79.4 kg (175 lb)   SpO2 96%   BMI 25.11 kg/m²      Consciousness Level  Awake  Cardiopulmonary Status  Stable  Pain Adequately Treated YES  Nausea / Vomiting  NO  Adequate Hydration  YES  Anesthesia Related Complications NONE      Electronically signed by Gail Bonilla MD on 10/23/2024 at 1:33 PM           No notable events documented.

## 2024-10-23 NOTE — DISCHARGE INSTRUCTIONS
DISCHARGE INSTRUCTIONS FOR KYPHOPLASTY    After a kyphoplasty to relieve pain from compression fractures, your back may feel sore where the needle went into your back.  This should go away in a few days.  In order to continue your care at home, please follow the instructions below.    For General Anesthesia  Do not drink any alcoholic beverages, make any legal or important decisions, or drive for 24 hours.      Diet    After general anesthesia, start out eating lightly (broth, soup, crackers, toast, etc.) advancing as tolerated to your usual diet.  Try to avoid spicy or greasy/fatty foods for 24 hours.    Drink plenty of fluids after surgery, unless you are on a fluid restriction. Avoid milk/milk product for several hours.    Medications  Take medications as instructed by your surgeon.   Please do not take prescribed pain medication with alcoholic beverages.  When cleared to drive by your surgeon, please do not drive or operate machinery while taking any prescribed pain medication.    Activities  As instructed by your surgeon.  Limit your activities for 24 hours.  Rest when you feel tired.  Getting enough sleep will help you recover.  Try to walk each day. Start by walking a little more than you did the day before. Bit by bit, increase the amount you walk. Walking boosts blood flow and helps prevent pneumonia and constipation.   Avoid lifting, pushing,or pulling anything that would make you strain until surgeon approves. This may include heavy grocery bags and milk containers, a heavy briefcase or backpack, cat litter or dog food bags, a vacuum , or a child.    Surgery Area  Always keep surgical site clean and dry  If covered with a dressing, you may remove it in 48 hours, unless instructed otherwise by your surgeon.  If you are sore where the needle was inserted, put ice or a cold pack on your back for 10 to 20 minutes at a time.  Try to do this every 1 to 2 hours for the next 3 days while awake or until the

## 2024-10-23 NOTE — ANESTHESIA PRE PROCEDURE
Department of Anesthesiology  Preprocedure Note       Name:  Frank Lisa   Age:  64 y.o.  :  1960                                          MRN:  [id number]         Date:  10/23/2024      Surgeon: Surgeon(s):  Júnior Cueva MD    Procedure: Procedure(s):  KYPHOPLASTY L1    Medications prior to admission:   Prior to Admission medications    Medication Sig Start Date End Date Taking? Authorizing Provider   lactulose (CHRONULAC) 10 GM/15ML solution Take 45 mLs by mouth 3 times daily 10/10/24  Yes Lopez Rosario PA   atorvastatin (LIPITOR) 40 MG tablet Take 1 tablet by mouth nightly 10/6/24  Yes Jaziel Cerda MD   vitamin D (ERGOCALCIFEROL) 1.25 MG (20109 UT) CAPS capsule Take 1 capsule by mouth once a week 10/6/24  Yes Jaziel Cerda MD   magnesium oxide (MAG-OX) 400 MG tablet Take 1 tablet by mouth daily 24  Yes Lopez Rosario PA   potassium chloride (KLOR-CON M) 20 MEQ extended release tablet Take 1 tablet by mouth daily 24  Yes Lopez Rosario PA   ondansetron (ZOFRAN-ODT) 4 MG disintegrating tablet Take 1 tablet by mouth every 8 hours as needed for Nausea or Vomiting 7/15/24  Yes Jaziel Cerda MD   pantoprazole (PROTONIX) 40 MG tablet Take 1 tablet by mouth in the morning and at bedtime 24  Yes Jessika Abad MD   sucralfate (CARAFATE) 1 GM tablet Take 1 tablet by mouth 4 times daily (before meals and nightly) 24  Yes Jessika Abad MD   metoprolol tartrate (LOPRESSOR) 25 MG tablet Take 0.5 tablets by mouth 2 times daily 24  Yes Jessika Abad MD   ferrous sulfate (IRON 325) 325 (65 Fe) MG tablet Take 1 tablet by mouth in the morning and at bedtime   Yes Dania Peoples MD   folic acid (FOLVITE) 1 MG tablet Take 1 tablet by mouth daily 5/3/24  Yes Roberto Workman MD   DULoxetine (CYMBALTA) 30 MG extended release capsule Take 1 capsule by mouth daily 24  Yes Hamida Loaiza MD   Elastic Bandages & Supports (MEDICAL COMPRESSION STOCKINGS) MISC 2

## 2024-10-23 NOTE — BRIEF OP NOTE
Brief Postoperative Note      Patient: Frank Lisa  YOB: 1960  MRN: [id number]    Date of Procedure: 10/23/2024    Pre-Op Diagnosis Codes:      * Compression fracture of L1 lumbar vertebra (HCC) [S32.010A]    Post-Op Diagnosis: Same       Procedure(s):  KYPHOPLASTY L1    Surgeon(s):  Júnior Cueva MD    Assistant:  * No surgical staff found *    Anesthesia: General    Estimated Blood Loss (mL): Minimal    Complications: None    Specimens:   * No specimens in log *    Implants:  Implant Name Type Inv. Item Serial No.  Lot No. LRB No. Used Action   CEMENT BNE HI VISC RADIOPAQUE KYPHON HV-R - [id number]  CEMENT BNE HI VISC RADIOPAQUE KYPHON HV-R  [employer] - innovate | mentor | invest [id number] N/A 1 Implanted         Drains: * No LDAs found *    Findings:  Infection Present At Time Of Surgery (PATOS) (choose all levels that have infection present):  No infection present  Other Findings: see dictation    Electronically signed by Júnior Cueva MD on 10/23/2024 at 8:08 AM

## 2024-10-23 NOTE — H&P
HISTORY and PHYSICAL  [facility]       NAME:  Frank Lisa  MRN: [id number]   YOB: 1960   Date: 10/23/2024   Age: 64 y.o.  Gender: male       COMPLAINT AND PRESENT HISTORY:     Frank Lisa is 64 y.o.,  male, presents for KYPHOPLASTY L1   Primary dx: Compression fracture of L1 lumbar vertebra (HCC) [S32.010A].    Office note per Dr Cueva on 10/17/2024  HPI:  This is a 64 y.o. male who presents to the clinic today to review MRI results.      Patient reports his pain is worsening, and thinks this pain is more debilitating than his previous compression fractures.  Diagnostic imaging:  MRI lumbar spine is reviewed I believe I have a discrepancy with the radiologist as to the exact level I am dictating that the patient had a T11 kyphoplasty most recently with a chronic T12 compression fracture sequelae healed patient has a new L1 compression fracture just over 30% in the midportion of the vertebral body    Office note per Dr Aguirre on 10/15/2024  HPI:  This is a 64 y.o. male who presents to the clinic today for 2 week post T12 kyphoplasty, 10/2/2024.      Patient is scheduled for C5-6, C6-7 ACDF on 10/21/2024.      Patient was seen in ED today due to his immense pain. He woke up this morning about 4am in extreme pain.    Office note per Dr. Cueva on 9/10/2024, italicized below.  HPI:  This is a 64 y.o. male who presents to the clinic today for neck evaluation. S/p T3 kyphoplasty, 7/24/2024.   Patients main complete today is paresthesia in his bilateral fingers. Patient states he is unable to flip the newspaper pages due to his fingertips being completely numb.  Patient reports he intermittently has low back pain.     Office visit per Dr. Cueva on 10/1/2024  HPI:  This is a 64 y.o. male who presents to the clinic today for follow up post ED visit, 9/24/2024.     Patient reported to ED after exacerbation of back pain on 9/23/2024.  Patient bent over to hook his dog up to a leash which

## 2024-10-23 NOTE — OP NOTE
[place] [address]                            OPERATIVE REPORT      PATIENT NAME: ALHAJI COOLEY              : 1960  MED REC NO: [id number]                          ROOM: [facility]  ACCOUNT NO: [id number]                       ADMIT DATE: 10/23/2024  PROVIDER: Júnior Cueva MD      DATE OF PROCEDURE:  10/23/2024    SURGEON:  Júnior Cueva MD    PREOPERATIVE DIAGNOSIS:  Osteoporotic compression fracture, L1.    POSTOPERATIVE DIAGNOSIS:  Osteoporotic compression fracture, L1.    PROCEDURE PERFORMED:  Kyphoplasty, L1, with fluoroscopic assistance.    ASSISTANT:  None.    ANESTHESIA:  General.    BLOOD LOSS:  Minimal.    COMPLICATIONS:  None.    SPECIMEN:  None.    IMPLANTS:  Kyphon HV-R cement.    DRAINS:  None.    FINDINGS:  The patient with excellent cement fill interdigitation.    PROCEDURE IN DETAIL:  The patient was taken to the operating room, placed under general anesthesia, transferred to the Brant table, checked for padding and positioning.  AP and lateral fluoroscopy were arranged for the procedure.  Level was easily localized as he had a previous kyphoplasty at T11, chronic compression deformity at T12, and a minor superior endplate compression fracture at L1.  After arranging the C-arms, the back was prepped and draped in usual sterile fashion.  With the aid of AP fluoroscopy, 18-gauge spinal needles were advanced from the skin to the pedicle docking position, back injected with local anesthetic, stab incisions made.    The patient was now cannulated bilaterally with minimal biplanar fluoroscopic views in initial docking, mid pedicle, posterior wall, and final resting position.  Vertebral body was now drilled.  Balloons were placed and inflated.  It was a little bit hard to tell whether there was a little bit of an optical illusion or whether real, but he appeared to get some elevation of superior endplate.  We

## 2024-10-25 ENCOUNTER — TELEPHONE (OUTPATIENT)
Dept: PRIMARY CARE CLINIC | Age: 64
End: 2024-10-25

## 2024-10-25 NOTE — TELEPHONE ENCOUNTER
Rohini from [facility] called to notify Lopez Rosario     Physical Therapy     Plan of Care   x1 time a week for x3 weeks for neck pain      [phone]    Will be faxing orders for signature

## 2024-10-28 ENCOUNTER — APPOINTMENT (OUTPATIENT)
Dept: CT IMAGING | Age: 64
End: 2024-10-28
Payer: COMMERCIAL

## 2024-10-28 ENCOUNTER — HOSPITAL ENCOUNTER (INPATIENT)
Age: 64
LOS: 4 days | Discharge: NURSING, REHAB OR LONG-TERM CARE | End: 2024-11-01
Attending: EMERGENCY MEDICINE | Admitting: INTERNAL MEDICINE
Payer: COMMERCIAL

## 2024-10-28 DIAGNOSIS — E87.6 HYPOKALEMIA: ICD-10-CM

## 2024-10-28 DIAGNOSIS — M54.50 ACUTE MIDLINE LOW BACK PAIN WITHOUT SCIATICA: ICD-10-CM

## 2024-10-28 DIAGNOSIS — F10.939 ALCOHOL WITHDRAWAL SYNDROME WITH COMPLICATION (HCC): Primary | ICD-10-CM

## 2024-10-28 DIAGNOSIS — E83.42 HYPOMAGNESEMIA: ICD-10-CM

## 2024-10-28 DIAGNOSIS — S22.000A COMPRESSION FRACTURE OF THORACIC VERTEBRA, UNSPECIFIED THORACIC VERTEBRAL LEVEL, INITIAL ENCOUNTER (HCC): ICD-10-CM

## 2024-10-28 DIAGNOSIS — M48.54XS NON-TRAUMATIC COMPRESSION FRACTURE OF T11 THORACIC VERTEBRA, SEQUELA: ICD-10-CM

## 2024-10-28 PROBLEM — F10.139 ALCOHOL ABUSE WITH WITHDRAWAL (HCC): Status: ACTIVE | Noted: 2024-10-28

## 2024-10-28 LAB
ALBUMIN SERPL-MCNC: 3.2 G/DL (ref 3.5–5.2)
ALP SERPL-CCNC: 228 U/L (ref 40–129)
ALT SERPL-CCNC: 11 U/L (ref 10–50)
ANION GAP SERPL CALCULATED.3IONS-SCNC: 16 MMOL/L (ref 9–16)
AST SERPL-CCNC: 65 U/L (ref 10–50)
BASOPHILS # BLD: 0 K/UL (ref 0–0.2)
BASOPHILS NFR BLD: 0 % (ref 0–2)
BILIRUB SERPL-MCNC: 1.7 MG/DL (ref 0–1.2)
BUN SERPL-MCNC: 6 MG/DL (ref 8–23)
CALCIUM SERPL-MCNC: 9 MG/DL (ref 8.6–10.4)
CHLORIDE SERPL-SCNC: 100 MMOL/L (ref 98–107)
CK SERPL-CCNC: 563 U/L (ref 39–308)
CO2 SERPL-SCNC: 25 MMOL/L (ref 20–31)
CREAT SERPL-MCNC: 0.9 MG/DL (ref 0.7–1.2)
EOSINOPHIL # BLD: 0 K/UL (ref 0–0.4)
EOSINOPHILS RELATIVE PERCENT: 0 % (ref 0–4)
ERYTHROCYTE [DISTWIDTH] IN BLOOD BY AUTOMATED COUNT: 15.7 % (ref 11.5–14.9)
ETHANOL PERCENT: 0.08 %
ETHANOLAMINE SERPL-MCNC: 77 MG/DL (ref 0–0.08)
GFR, ESTIMATED: >90 ML/MIN/1.73M2
GLUCOSE SERPL-MCNC: 85 MG/DL (ref 74–99)
HCT VFR BLD AUTO: 28 % (ref 41–53)
HGB BLD-MCNC: 9.6 G/DL (ref 13.5–17.5)
INR PPP: 1.3
LIPASE SERPL-CCNC: 17 U/L (ref 13–60)
LYMPHOCYTES NFR BLD: 0.7 K/UL (ref 1–4.8)
LYMPHOCYTES RELATIVE PERCENT: 8 % (ref 24–44)
MAGNESIUM SERPL-MCNC: 1.5 MG/DL (ref 1.6–2.4)
MCH RBC QN AUTO: 31.1 PG (ref 26–34)
MCHC RBC AUTO-ENTMCNC: 34.4 G/DL (ref 31–37)
MCV RBC AUTO: 90.2 FL (ref 80–100)
MONOCYTES NFR BLD: 1.3 K/UL (ref 0.1–1.3)
MONOCYTES NFR BLD: 14 % (ref 1–7)
NEUTROPHILS NFR BLD: 78 % (ref 36–66)
NEUTS SEG NFR BLD: 7.3 K/UL (ref 1.3–9.1)
PLATELET # BLD AUTO: 141 K/UL (ref 150–450)
PMV BLD AUTO: 7.1 FL (ref 6–12)
POTASSIUM SERPL-SCNC: 3.3 MMOL/L (ref 3.7–5.3)
PROT SERPL-MCNC: 6.2 G/DL (ref 6.6–8.7)
PROTHROMBIN TIME: 16.9 SEC (ref 11.8–14.6)
RBC # BLD AUTO: 3.1 M/UL (ref 4.5–5.9)
SODIUM SERPL-SCNC: 141 MMOL/L (ref 136–145)
TROPONIN I SERPL HS-MCNC: 23 NG/L (ref 0–22)
TROPONIN I SERPL HS-MCNC: 27 NG/L (ref 0–22)
WBC OTHER # BLD: 9.3 K/UL (ref 3.5–11)

## 2024-10-28 PROCEDURE — 6370000000 HC RX 637 (ALT 250 FOR IP): Performed by: EMERGENCY MEDICINE

## 2024-10-28 PROCEDURE — G0480 DRUG TEST DEF 1-7 CLASSES: HCPCS

## 2024-10-28 PROCEDURE — 85610 PROTHROMBIN TIME: CPT

## 2024-10-28 PROCEDURE — 6370000000 HC RX 637 (ALT 250 FOR IP)

## 2024-10-28 PROCEDURE — 93005 ELECTROCARDIOGRAM TRACING: CPT | Performed by: EMERGENCY MEDICINE

## 2024-10-28 PROCEDURE — 2500000003 HC RX 250 WO HCPCS: Performed by: EMERGENCY MEDICINE

## 2024-10-28 PROCEDURE — 6360000002 HC RX W HCPCS

## 2024-10-28 PROCEDURE — 6360000004 HC RX CONTRAST MEDICATION: Performed by: EMERGENCY MEDICINE

## 2024-10-28 PROCEDURE — 84484 ASSAY OF TROPONIN QUANT: CPT

## 2024-10-28 PROCEDURE — 83690 ASSAY OF LIPASE: CPT

## 2024-10-28 PROCEDURE — 96365 THER/PROPH/DIAG IV INF INIT: CPT

## 2024-10-28 PROCEDURE — 72128 CT CHEST SPINE W/O DYE: CPT

## 2024-10-28 PROCEDURE — 6360000002 HC RX W HCPCS: Performed by: EMERGENCY MEDICINE

## 2024-10-28 PROCEDURE — 2060000000 HC ICU INTERMEDIATE R&B

## 2024-10-28 PROCEDURE — 96375 TX/PRO/DX INJ NEW DRUG ADDON: CPT

## 2024-10-28 PROCEDURE — 85025 COMPLETE CBC W/AUTO DIFF WBC: CPT

## 2024-10-28 PROCEDURE — 83735 ASSAY OF MAGNESIUM: CPT

## 2024-10-28 PROCEDURE — 36415 COLL VENOUS BLD VENIPUNCTURE: CPT

## 2024-10-28 PROCEDURE — 80053 COMPREHEN METABOLIC PANEL: CPT

## 2024-10-28 PROCEDURE — 2580000003 HC RX 258

## 2024-10-28 PROCEDURE — 74177 CT ABD & PELVIS W/CONTRAST: CPT

## 2024-10-28 PROCEDURE — 2580000003 HC RX 258: Performed by: EMERGENCY MEDICINE

## 2024-10-28 PROCEDURE — 72131 CT LUMBAR SPINE W/O DYE: CPT

## 2024-10-28 PROCEDURE — 99285 EMERGENCY DEPT VISIT HI MDM: CPT

## 2024-10-28 PROCEDURE — 82550 ASSAY OF CK (CPK): CPT

## 2024-10-28 RX ORDER — DULOXETIN HYDROCHLORIDE 30 MG/1
30 CAPSULE, DELAYED RELEASE ORAL DAILY
Status: DISCONTINUED | OUTPATIENT
Start: 2024-10-28 | End: 2024-11-01 | Stop reason: HOSPADM

## 2024-10-28 RX ORDER — GAUZE BANDAGE 2" X 2"
100 BANDAGE TOPICAL DAILY
Status: DISCONTINUED | OUTPATIENT
Start: 2024-10-28 | End: 2024-11-01 | Stop reason: HOSPADM

## 2024-10-28 RX ORDER — LORAZEPAM 1 MG/1
2 TABLET ORAL
Status: DISCONTINUED | OUTPATIENT
Start: 2024-10-28 | End: 2024-11-01 | Stop reason: HOSPADM

## 2024-10-28 RX ORDER — FERROUS SULFATE 325(65) MG
325 TABLET ORAL 2 TIMES DAILY
Status: DISCONTINUED | OUTPATIENT
Start: 2024-10-28 | End: 2024-11-01 | Stop reason: HOSPADM

## 2024-10-28 RX ORDER — LORAZEPAM 1 MG/1
3 TABLET ORAL
Status: DISCONTINUED | OUTPATIENT
Start: 2024-10-28 | End: 2024-11-01 | Stop reason: HOSPADM

## 2024-10-28 RX ORDER — LORAZEPAM 1 MG/1
4 TABLET ORAL
Status: DISCONTINUED | OUTPATIENT
Start: 2024-10-28 | End: 2024-11-01 | Stop reason: HOSPADM

## 2024-10-28 RX ORDER — HYDROCODONE BITARTRATE AND ACETAMINOPHEN 5; 325 MG/1; MG/1
1 TABLET ORAL EVERY 6 HOURS PRN
Status: DISCONTINUED | OUTPATIENT
Start: 2024-10-28 | End: 2024-11-01 | Stop reason: HOSPADM

## 2024-10-28 RX ORDER — SODIUM CHLORIDE 0.9 % (FLUSH) 0.9 %
5-40 SYRINGE (ML) INJECTION PRN
Status: DISCONTINUED | OUTPATIENT
Start: 2024-10-28 | End: 2024-11-01 | Stop reason: HOSPADM

## 2024-10-28 RX ORDER — ONDANSETRON 2 MG/ML
4 INJECTION INTRAMUSCULAR; INTRAVENOUS EVERY 6 HOURS PRN
Status: DISCONTINUED | OUTPATIENT
Start: 2024-10-28 | End: 2024-11-01 | Stop reason: HOSPADM

## 2024-10-28 RX ORDER — SODIUM CHLORIDE 0.9 % (FLUSH) 0.9 %
5-40 SYRINGE (ML) INJECTION EVERY 12 HOURS SCHEDULED
Status: DISCONTINUED | OUTPATIENT
Start: 2024-10-28 | End: 2024-11-01 | Stop reason: HOSPADM

## 2024-10-28 RX ORDER — ENOXAPARIN SODIUM 100 MG/ML
40 INJECTION SUBCUTANEOUS DAILY
Status: DISCONTINUED | OUTPATIENT
Start: 2024-10-28 | End: 2024-11-01 | Stop reason: HOSPADM

## 2024-10-28 RX ORDER — ACETAMINOPHEN 650 MG/1
325 SUPPOSITORY RECTAL EVERY 6 HOURS PRN
Status: DISCONTINUED | OUTPATIENT
Start: 2024-10-28 | End: 2024-11-01 | Stop reason: HOSPADM

## 2024-10-28 RX ORDER — ONDANSETRON 4 MG/1
4 TABLET, ORALLY DISINTEGRATING ORAL EVERY 8 HOURS PRN
Status: DISCONTINUED | OUTPATIENT
Start: 2024-10-28 | End: 2024-11-01 | Stop reason: HOSPADM

## 2024-10-28 RX ORDER — FOLIC ACID 1 MG/1
1 TABLET ORAL DAILY
Status: DISCONTINUED | OUTPATIENT
Start: 2024-10-28 | End: 2024-11-01 | Stop reason: HOSPADM

## 2024-10-28 RX ORDER — MAGNESIUM SULFATE HEPTAHYDRATE 40 MG/ML
2000 INJECTION, SOLUTION INTRAVENOUS PRN
Status: DISCONTINUED | OUTPATIENT
Start: 2024-10-28 | End: 2024-11-01 | Stop reason: HOSPADM

## 2024-10-28 RX ORDER — SODIUM CHLORIDE 9 MG/ML
INJECTION, SOLUTION INTRAVENOUS PRN
Status: DISCONTINUED | OUTPATIENT
Start: 2024-10-28 | End: 2024-11-01 | Stop reason: HOSPADM

## 2024-10-28 RX ORDER — 0.9 % SODIUM CHLORIDE 0.9 %
100 INTRAVENOUS SOLUTION INTRAVENOUS ONCE
Status: COMPLETED | OUTPATIENT
Start: 2024-10-28 | End: 2024-10-28

## 2024-10-28 RX ORDER — MAGNESIUM SULFATE HEPTAHYDRATE 40 MG/ML
2000 INJECTION, SOLUTION INTRAVENOUS ONCE
Status: COMPLETED | OUTPATIENT
Start: 2024-10-28 | End: 2024-10-28

## 2024-10-28 RX ORDER — POTASSIUM CHLORIDE 1500 MG/1
40 TABLET, EXTENDED RELEASE ORAL PRN
Status: DISPENSED | OUTPATIENT
Start: 2024-10-28 | End: 2024-10-31

## 2024-10-28 RX ORDER — METOPROLOL TARTRATE 25 MG/1
12.5 TABLET, FILM COATED ORAL 2 TIMES DAILY
Status: DISCONTINUED | OUTPATIENT
Start: 2024-10-28 | End: 2024-11-01 | Stop reason: HOSPADM

## 2024-10-28 RX ORDER — MORPHINE SULFATE 4 MG/ML
4 INJECTION, SOLUTION INTRAMUSCULAR; INTRAVENOUS ONCE
Status: COMPLETED | OUTPATIENT
Start: 2024-10-28 | End: 2024-10-28

## 2024-10-28 RX ORDER — POTASSIUM CHLORIDE 1500 MG/1
40 TABLET, EXTENDED RELEASE ORAL ONCE
Status: COMPLETED | OUTPATIENT
Start: 2024-10-28 | End: 2024-10-28

## 2024-10-28 RX ORDER — POTASSIUM CHLORIDE 7.45 MG/ML
10 INJECTION INTRAVENOUS PRN
Status: ACTIVE | OUTPATIENT
Start: 2024-10-28 | End: 2024-10-31

## 2024-10-28 RX ORDER — DEXTROSE MONOHYDRATE 100 MG/ML
INJECTION, SOLUTION INTRAVENOUS CONTINUOUS PRN
Status: DISCONTINUED | OUTPATIENT
Start: 2024-10-28 | End: 2024-11-01 | Stop reason: HOSPADM

## 2024-10-28 RX ORDER — HYDROCODONE BITARTRATE AND ACETAMINOPHEN 5; 325 MG/1; MG/1
1 TABLET ORAL 2 TIMES DAILY
Status: DISCONTINUED | OUTPATIENT
Start: 2024-10-28 | End: 2024-10-28

## 2024-10-28 RX ORDER — IOPAMIDOL 755 MG/ML
75 INJECTION, SOLUTION INTRAVASCULAR
Status: COMPLETED | OUTPATIENT
Start: 2024-10-28 | End: 2024-10-28

## 2024-10-28 RX ORDER — LORAZEPAM 2 MG/ML
1 INJECTION INTRAMUSCULAR ONCE
Status: COMPLETED | OUTPATIENT
Start: 2024-10-28 | End: 2024-10-28

## 2024-10-28 RX ORDER — PANTOPRAZOLE SODIUM 40 MG/1
40 TABLET, DELAYED RELEASE ORAL 2 TIMES DAILY
Status: DISCONTINUED | OUTPATIENT
Start: 2024-10-28 | End: 2024-11-01 | Stop reason: HOSPADM

## 2024-10-28 RX ORDER — OXYCODONE AND ACETAMINOPHEN 5; 325 MG/1; MG/1
1 TABLET ORAL EVERY 6 HOURS PRN
Status: CANCELLED | OUTPATIENT
Start: 2024-10-28

## 2024-10-28 RX ORDER — LORAZEPAM 2 MG/ML
2 INJECTION INTRAMUSCULAR
Status: DISCONTINUED | OUTPATIENT
Start: 2024-10-28 | End: 2024-11-01 | Stop reason: HOSPADM

## 2024-10-28 RX ORDER — ATORVASTATIN CALCIUM 40 MG/1
40 TABLET, FILM COATED ORAL NIGHTLY
Status: DISCONTINUED | OUTPATIENT
Start: 2024-10-28 | End: 2024-11-01 | Stop reason: HOSPADM

## 2024-10-28 RX ORDER — POLYETHYLENE GLYCOL 3350 17 G/17G
17 POWDER, FOR SOLUTION ORAL DAILY PRN
Status: DISCONTINUED | OUTPATIENT
Start: 2024-10-28 | End: 2024-11-01 | Stop reason: HOSPADM

## 2024-10-28 RX ORDER — LORAZEPAM 1 MG/1
1 TABLET ORAL
Status: DISCONTINUED | OUTPATIENT
Start: 2024-10-28 | End: 2024-11-01 | Stop reason: HOSPADM

## 2024-10-28 RX ORDER — ONDANSETRON 2 MG/ML
8 INJECTION INTRAMUSCULAR; INTRAVENOUS ONCE
Status: COMPLETED | OUTPATIENT
Start: 2024-10-28 | End: 2024-10-28

## 2024-10-28 RX ORDER — LORAZEPAM 2 MG/ML
1 INJECTION INTRAMUSCULAR
Status: DISCONTINUED | OUTPATIENT
Start: 2024-10-28 | End: 2024-11-01 | Stop reason: HOSPADM

## 2024-10-28 RX ORDER — LORAZEPAM 2 MG/ML
4 INJECTION INTRAMUSCULAR
Status: DISCONTINUED | OUTPATIENT
Start: 2024-10-28 | End: 2024-11-01 | Stop reason: HOSPADM

## 2024-10-28 RX ORDER — ACETAMINOPHEN 500 MG
500 TABLET ORAL EVERY 6 HOURS PRN
Status: DISCONTINUED | OUTPATIENT
Start: 2024-10-28 | End: 2024-11-01 | Stop reason: HOSPADM

## 2024-10-28 RX ORDER — LORAZEPAM 2 MG/ML
3 INJECTION INTRAMUSCULAR
Status: DISCONTINUED | OUTPATIENT
Start: 2024-10-28 | End: 2024-11-01 | Stop reason: HOSPADM

## 2024-10-28 RX ORDER — 0.9 % SODIUM CHLORIDE 0.9 %
1000 INTRAVENOUS SOLUTION INTRAVENOUS ONCE
Status: COMPLETED | OUTPATIENT
Start: 2024-10-28 | End: 2024-10-29

## 2024-10-28 RX ORDER — SODIUM CHLORIDE 0.9 % (FLUSH) 0.9 %
10 SYRINGE (ML) INJECTION PRN
Status: DISCONTINUED | OUTPATIENT
Start: 2024-10-28 | End: 2024-11-01 | Stop reason: HOSPADM

## 2024-10-28 RX ORDER — LACTULOSE 10 G/15ML
30 SOLUTION ORAL 3 TIMES DAILY
Status: DISCONTINUED | OUTPATIENT
Start: 2024-10-28 | End: 2024-11-01 | Stop reason: HOSPADM

## 2024-10-28 RX ADMIN — LORAZEPAM 1 MG: 2 INJECTION INTRAMUSCULAR; INTRAVENOUS at 12:32

## 2024-10-28 RX ADMIN — LACTULOSE 30 G: 20 SOLUTION ORAL at 18:22

## 2024-10-28 RX ADMIN — SODIUM CHLORIDE, PRESERVATIVE FREE 10 ML: 5 INJECTION INTRAVENOUS at 13:42

## 2024-10-28 RX ADMIN — PANTOPRAZOLE SODIUM 40 MG: 40 TABLET, DELAYED RELEASE ORAL at 21:13

## 2024-10-28 RX ADMIN — ONDANSETRON 8 MG: 2 INJECTION, SOLUTION INTRAMUSCULAR; INTRAVENOUS at 12:32

## 2024-10-28 RX ADMIN — MORPHINE SULFATE 4 MG: 4 INJECTION, SOLUTION INTRAMUSCULAR; INTRAVENOUS at 12:32

## 2024-10-28 RX ADMIN — FOLIC ACID 1 MG: 1 TABLET ORAL at 18:22

## 2024-10-28 RX ADMIN — LACTULOSE 30 G: 20 SOLUTION ORAL at 21:12

## 2024-10-28 RX ADMIN — METOPROLOL TARTRATE 12.5 MG: 25 TABLET, FILM COATED ORAL at 21:13

## 2024-10-28 RX ADMIN — SODIUM CHLORIDE 1000 ML: 9 INJECTION, SOLUTION INTRAVENOUS at 18:33

## 2024-10-28 RX ADMIN — LORAZEPAM 3 MG: 2 INJECTION INTRAMUSCULAR; INTRAVENOUS at 16:56

## 2024-10-28 RX ADMIN — POTASSIUM CHLORIDE 40 MEQ: 1500 TABLET, EXTENDED RELEASE ORAL at 17:31

## 2024-10-28 RX ADMIN — POTASSIUM CHLORIDE 40 MEQ: 1500 TABLET, EXTENDED RELEASE ORAL at 13:46

## 2024-10-28 RX ADMIN — SODIUM CHLORIDE, PRESERVATIVE FREE 10 ML: 5 INJECTION INTRAVENOUS at 21:13

## 2024-10-28 RX ADMIN — THIAMINE HCL TAB 100 MG 100 MG: 100 TAB at 15:27

## 2024-10-28 RX ADMIN — HYDROCODONE BITARTRATE AND ACETAMINOPHEN 1 TABLET: 5; 325 TABLET ORAL at 17:30

## 2024-10-28 RX ADMIN — MAGNESIUM SULFATE HEPTAHYDRATE 2000 MG: 40 INJECTION, SOLUTION INTRAVENOUS at 13:58

## 2024-10-28 RX ADMIN — ENOXAPARIN SODIUM 40 MG: 100 INJECTION SUBCUTANEOUS at 18:26

## 2024-10-28 RX ADMIN — ATORVASTATIN CALCIUM 40 MG: 40 TABLET, FILM COATED ORAL at 21:13

## 2024-10-28 RX ADMIN — FERROUS SULFATE TAB 325 MG (65 MG ELEMENTAL FE) 325 MG: 325 (65 FE) TAB at 21:13

## 2024-10-28 RX ADMIN — DULOXETINE HYDROCHLORIDE 30 MG: 30 CAPSULE, DELAYED RELEASE ORAL at 18:22

## 2024-10-28 RX ADMIN — SODIUM CHLORIDE 100 ML: 9 INJECTION, SOLUTION INTRAVENOUS at 13:42

## 2024-10-28 RX ADMIN — IOPAMIDOL 75 ML: 755 INJECTION, SOLUTION INTRAVENOUS at 13:42

## 2024-10-28 ASSESSMENT — PAIN DESCRIPTION - LOCATION: LOCATION: BACK

## 2024-10-28 ASSESSMENT — PAIN SCALES - GENERAL
PAINLEVEL_OUTOF10: 10
PAINLEVEL_OUTOF10: 0

## 2024-10-28 ASSESSMENT — ENCOUNTER SYMPTOMS
BACK PAIN: 1
COUGH: 0
VOMITING: 0
DIARRHEA: 1
APNEA: 0
TROUBLE SWALLOWING: 0
ABDOMINAL PAIN: 0
SHORTNESS OF BREATH: 0

## 2024-10-28 ASSESSMENT — PAIN DESCRIPTION - ORIENTATION: ORIENTATION: MID

## 2024-10-28 ASSESSMENT — PAIN - FUNCTIONAL ASSESSMENT: PAIN_FUNCTIONAL_ASSESSMENT: PREVENTS OR INTERFERES SOME ACTIVE ACTIVITIES AND ADLS

## 2024-10-28 NOTE — ED PROVIDER NOTES
EMERGENCY DEPARTMENT ENCOUNTER    Pt Name: Frank Lisa  MRN: [id number]  Birthdate 1960  Date of evaluation: 10/28/24  CHIEF COMPLAINT       Chief Complaint   Patient presents with    Back Pain     HISTORY OF PRESENT ILLNESS   HPI  Low back aches and flank pains, difficulty walking today.  He does have chronic alcoholism, admits to drinking heavily over the weekend.  He ran out of his Norco yesterday.  He takes it for his chronic back pain.  He sees the GI team here for liver disease.  He sees the orthospine doctor here for chronic back pain.  He did not have any falls.  He uses a walker at home.  He felt some back pain when he was leaning over and he felt like he injured his back yesterday.  Denies any hematemesis or hematochezia or melena.  He is a poor historian in general.  He appears to be anxious and in alcohol withdrawal at this time.      REVIEW OF SYSTEMS     Review of Systems   All other systems reviewed and are negative.    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 Mc, orthopedic, every 3-4 months, gets steroid injection    Lezama esophagus     Bleeding gastric varices 12/29/2022    Bleeds easily (HCC)     Bowel perforation (HCC) 03/25/2023    BPH (benign prostatic hypertrophy)     Bruises easily     Cholelithiasis     Cirrhosis (HCC)     COVID-19 12/2020    pt reports he had a positive test while at [facility] in 2020, was asymptomatic    COVID-19 vaccine series completed 5/20/2021, 6/22/2021    Moderna 5/20/2021, 6/22/2021    DDD (degenerative disc disease), lumbar        CK - Abnormal; Notable for the following components:    Total  (*)     All other components within normal limits   LIPASE   URINALYSIS WITH REFLEX TO CULTURE   URINE DRUG SCREEN       Vitals Reviewed:    Vitals:    10/28/24 1200 10/28/24 1239 10/28/24 1344 10/28/24 1345   BP:    (!) 151/68   Pulse: (!) 125 (!) 115 (!) 109    Resp: 20 25 11    Temp:       SpO2: 100% 100% 98%      MEDICATIONS GIVEN TO PATIENT THIS ENCOUNTER:  Orders Placed This Encounter   Medications    LORazepam (ATIVAN) injection 1 mg    morphine injection 4 mg    ondansetron (ZOFRAN) injection 8 mg    potassium chloride (KLOR-CON M) extended release tablet 40 mEq    magnesium sulfate 2000 mg in water 50 mL IVPB    sodium chloride flush 0.9 % injection 10 mL    sodium chloride 0.9 % bolus 100 mL    iopamidol (ISOVUE-370) 76 % injection 75 mL     DISCHARGE PRESCRIPTIONS:  New Prescriptions    No medications on file     PHYSICIAN CONSULTS ORDERED THIS ENCOUNTER:  IP CONSULT TO ORTHOPEDIC SURGERY  IP CONSULT TO HOSPITALIST  IP CONSULT TO VASCULAR ACCESS TEAM  FINAL IMPRESSION      1. Alcohol withdrawal syndrome with complication (HCC)    2. Hypokalemia    3. Hypomagnesemia    4. Compression fracture of thoracic vertebra, unspecified thoracic vertebral level, initial encounter (HCC)    5. Acute midline low back pain without sciatica          DISPOSITION/PLAN   DISPOSITION Decision To Admit 10/28/2024 02:43:59 PM           OUTPATIENT FOLLOW UP THE PATIENT:  No follow-up provider specified.    MD Niles Sultana Wesley D, MD  10/28/24 6200

## 2024-10-28 NOTE — PROGRESS NOTES
Pt arrived to floor via stretcher from ED and was transfered to bed.  Vitals taken, telemetry applied. Partial admission and assessment complete. No distress noted. See doc flowsheet and admission navigator for details. POC and education initiated and reviewed with patient. Call light within reach, SR padded and pt educated on its use. Bed in lowest position, and locked. Side rails up x 2. Denied further questions or needs at this time. Will continue to monitor.

## 2024-10-28 NOTE — H&P
[facility]  IN-PATIENT SERVICE  [facility]  IN-PATIENT SERVICE   [facility]     HISTORY AND PHYSICAL EXAMINATION            Date:   10/28/2024  Patient name:  Frank Lisa  Date of admission:  10/28/2024 11:29 AM  MRN:   [id number]  Account:  [id number]  YOB: 1960  PCP:    Lopez Rosario PA  Room:   2089/2089-01  Code Status:    Full Code    Chief Complaint:     Chief Complaint   Patient presents with    Back Pain       History Obtained From:     patient, electronic medical record    History of Present Illness:     Patient is a 64-year-old male who presents for back pain.  Patient has significant past medical history of cirrhosis s/p TIPS, A-fib with RVR (converted to sinus rhythm and April 2024 (, history of cecal adenocarcinoma/PE radiation therapy, history of GI bleeds/gastroesophageal varices, hepatitis C treated with Harvoni, history of bowel perforation requiring right hemicolectomy with end ileostomy reversed on October 2023, esophagitis grade D, T3, T11,L1 kyphoplasty, T12 compression fracture.    The patient presented this afternoon after not being able to get up from bed after waking up.  His last drink was around 11:30 PM yesterday.  He said that he felt chest pain due to his heart beating a lot, he has been nauseated, both his hands and legs are tremulous.  He said that he had back pain similar to the back pain he had 1 month ago with a compression fracture, and he believes that his back pain is from that.  He rates it as 9-10 out of 10.  He denies cough, change in bowel habits, abdominal pain.  He denies vomiting, shortness of breath, abdominal pain.  Patient's vital signs show tachycardia heart rate 115, blood pressure 155/84.  Patient has bilateral upper and lower tremors, and mid back pain increased with leg flexion.     CBC significant for anemia hemoglobin 9.6 (baseline 9.2),  L5-S1: There is a broad-based disc bulge measuring 4-5 mm with osteophytes. There is moderate facet and ligamentum flavum hypertrophy.  There is moderate to severe narrowing of the left neural foramen.  There is moderate narrowing of the right neural foramen.  The thecal sac is not stenotic. There is mild facet and ligamentum flavum hypertrophy.     Kyphoplasty at T12.  Benign remote compression fracture of L1. New acute compression fracture of the superior endplate of L2 without retropulsion of the vertebral body into the spinal canal. Retrolisthesis of L1 on L2.  Disc and osteophytes result in narrowing of the neural foramina throughout the lumbar region as discussed above.  Mild stenosis of the thecal sac at L4-5.     CT ABDOMEN PELVIS W IV CONTRAST Additional Contrast? None    Result Date: 10/11/2024  EXAMINATION: CT OF THE ABDOMEN AND PELVIS WITH CONTRAST 10/11/2024 1:29 pm TECHNIQUE: CT of the abdomen and pelvis was performed with the administration of intravenous contrast. Multiplanar reformatted images are provided for review. Automated exposure control, iterative reconstruction, and/or weight based adjustment of the mA/kV was utilized to reduce the radiation dose to as low as reasonably achievable. COMPARISON: July 20, 2024 HISTORY: ORDERING SYSTEM PROVIDED HISTORY: Other postprocedural complications of skin and subcutaneous tissue TECHNOLOGIST PROVIDED HISTORY: STAT Creatinine as needed:->No Reason for Exam: postprocedural complications of skin and subcutaneous tissue FINDINGS: Lower Chest: Moderate-sized pericardial effusion is increased since July 2024.  Small a moderate size right pleural effusion is partially imaged. There is small left pleural effusion.The heart is normal size. Organs: *Liver: Small.  No mass lesion visualized. *Spleen: Multiple small calcifications indicate prior granulomatous disease. *Kidneys: Normal *Adrenal Glands: Normal *Pancreas: Normal *Gallbladder and bile ducts: Calcified

## 2024-10-28 NOTE — PROGRESS NOTES
Pharmacy Medication History Note      List of current medications patient is taking is complete.    Source of information: [employer] Scripts, [place]ARRS, Care Everywhere     Changes made to medication list:  Medications removed (include reason, ex. therapy complete or physician discontinued, noncompliance):  Ondansetron - course complete   Potassium chloride - course complete     Medications flagged for provider review:  Pantoprazole - last filled 6/28/24 for 30 days   Sucralfate - last filled 6/28/24 for 30 days    Medications added/doses adjusted:  None     Other notes (ex. Recent course of antibiotics, Coumadin dosing):  Per OARRS, the patient filled Percocet 5-325 mg on 10/23/24 with quantity 20 for 5 days. The patient also filled Percocet 5-325 mg on 10/1/24 with quantity 20 for 5 days.   Per [place]ARRS, the patient filled Norco 5-325 mg on 10/15/24 with quantity 10 for 3 days. The patient also filled Norco 5-325 mg on 9/24/24 with quantity 10 for 3 days.       Current Home Medication List at Time of Admission:  Prior to Admission medications    Medication Sig   oxyCODONE-acetaminophen (PERCOCET) 5-325 MG per tablet Take 1 tablet by mouth every 6 hours as needed for Pain for up to 5 days. Intended supply: 5 days. Take lowest dose possible to manage pain Max Daily Amount: 4 tablets   lactulose (CHRONULAC) 10 GM/15ML solution Take 45 mLs by mouth 3 times daily   atorvastatin (LIPITOR) 40 MG tablet Take 1 tablet by mouth nightly   vitamin D (ERGOCALCIFEROL) 1.25 MG (81372 UT) CAPS capsule Take 1 capsule by mouth once a week   magnesium oxide (MAG-OX) 400 MG tablet Take 1 tablet by mouth daily   pantoprazole (PROTONIX) 40 MG tablet Take 1 tablet by mouth in the morning and at bedtime  Patient not taking: Reported on 10/28/2024   sucralfate (CARAFATE) 1 GM tablet Take 1 tablet by mouth 4 times daily (before meals and nightly)  Patient not taking: Reported on 10/28/2024   metoprolol tartrate (LOPRESSOR) 25 MG tablet Take 0.5

## 2024-10-28 NOTE — ED NOTES
Report given to , RN from PCU .   Report method by phone   The following was reviewed with receiving RN:   Current vital signs:  /61   Pulse 100   Temp 98.1 °F (36.7 °C)   Resp 12   SpO2 98%                MEWS Score: 3     Any medication or safety alerts were reviewed. Any pending diagnostics and notifications were also reviewed, as well as any safety concerns or issues, abnormal labs, abnormal imaging, and abnormal assessment findings. Questions were answered.

## 2024-10-29 PROBLEM — M48.54XS NON-TRAUMATIC COMPRESSION FRACTURE OF T11 THORACIC VERTEBRA, SEQUELA: Status: ACTIVE | Noted: 2024-10-29

## 2024-10-29 PROBLEM — S32.010S CLOSED COMPRESSION FRACTURE OF L1 LUMBAR VERTEBRA, SEQUELA: Status: ACTIVE | Noted: 2024-10-17

## 2024-10-29 PROBLEM — S22.070A CLOSED WEDGE COMPRESSION FRACTURE OF T10 VERTEBRA (HCC): Status: ACTIVE | Noted: 2024-10-29

## 2024-10-29 PROBLEM — M48.02 SPINAL STENOSIS IN CERVICAL REGION: Status: ACTIVE | Noted: 2024-10-29

## 2024-10-29 PROBLEM — M87.052 AVASCULAR NECROSIS OF LEFT FEMUR: Status: ACTIVE | Noted: 2024-10-29

## 2024-10-29 PROBLEM — S22.080S T12 COMPRESSION FRACTURE, SEQUELA: Status: ACTIVE | Noted: 2024-10-02

## 2024-10-29 LAB
ABSOLUTE BANDS: 0.06 K/UL (ref 0–1)
AMMONIA PLAS-SCNC: 27 UMOL/L (ref 16–60)
ANION GAP SERPL CALCULATED.3IONS-SCNC: 9 MMOL/L (ref 9–16)
BANDS: 1 % (ref 0–10)
BASOPHILS # BLD: 0 K/UL (ref 0–0.2)
BASOPHILS NFR BLD: 0 % (ref 0–2)
BUN SERPL-MCNC: 7 MG/DL (ref 8–23)
CALCIUM SERPL-MCNC: 8.2 MG/DL (ref 8.6–10.4)
CHLORIDE SERPL-SCNC: 104 MMOL/L (ref 98–107)
CO2 SERPL-SCNC: 24 MMOL/L (ref 20–31)
CREAT SERPL-MCNC: 0.9 MG/DL (ref 0.7–1.2)
EKG ATRIAL RATE: 120 BPM
EKG ATRIAL RATE: 120 BPM
EKG P-R INTERVAL: 128 MS
EKG Q-T INTERVAL: 336 MS
EKG Q-T INTERVAL: 348 MS
EKG QRS DURATION: 78 MS
EKG QRS DURATION: 82 MS
EKG QTC CALCULATION (BAZETT): 477 MS
EKG QTC CALCULATION (BAZETT): 491 MS
EKG R AXIS: -20 DEGREES
EKG R AXIS: 18 DEGREES
EKG T AXIS: -21 DEGREES
EKG T AXIS: 3 DEGREES
EKG VENTRICULAR RATE: 120 BPM
EKG VENTRICULAR RATE: 121 BPM
EOSINOPHIL # BLD: 0.18 K/UL (ref 0–0.4)
EOSINOPHILS RELATIVE PERCENT: 3 % (ref 0–4)
ERYTHROCYTE [DISTWIDTH] IN BLOOD BY AUTOMATED COUNT: 15.3 % (ref 11.5–14.9)
GFR, ESTIMATED: >90 ML/MIN/1.73M2
GLUCOSE SERPL-MCNC: 85 MG/DL (ref 74–99)
HCT VFR BLD AUTO: 27.1 % (ref 41–53)
HGB BLD-MCNC: 9.2 G/DL (ref 13.5–17.5)
LYMPHOCYTES NFR BLD: 0.54 K/UL (ref 1–4.8)
LYMPHOCYTES RELATIVE PERCENT: 9 % (ref 24–44)
MAGNESIUM SERPL-MCNC: 1.8 MG/DL (ref 1.6–2.4)
MCH RBC QN AUTO: 31 PG (ref 26–34)
MCHC RBC AUTO-ENTMCNC: 33.9 G/DL (ref 31–37)
MCV RBC AUTO: 91.5 FL (ref 80–100)
MONOCYTES NFR BLD: 0.42 K/UL (ref 0.1–1.3)
MONOCYTES NFR BLD: 7 % (ref 1–7)
MORPHOLOGY: ABNORMAL
MORPHOLOGY: ABNORMAL
NEUTROPHILS NFR BLD: 80 % (ref 36–66)
NEUTS SEG NFR BLD: 4.8 K/UL (ref 1.3–9.1)
PLATELET # BLD AUTO: 105 K/UL (ref 150–450)
PMV BLD AUTO: 7.5 FL (ref 6–12)
POTASSIUM SERPL-SCNC: 3.5 MMOL/L (ref 3.7–5.3)
RBC # BLD AUTO: 2.96 M/UL (ref 4.5–5.9)
SODIUM SERPL-SCNC: 137 MMOL/L (ref 136–145)
WBC OTHER # BLD: 6 K/UL (ref 3.5–11)

## 2024-10-29 PROCEDURE — 6360000002 HC RX W HCPCS

## 2024-10-29 PROCEDURE — 2500000003 HC RX 250 WO HCPCS

## 2024-10-29 PROCEDURE — 82140 ASSAY OF AMMONIA: CPT

## 2024-10-29 PROCEDURE — 80048 BASIC METABOLIC PNL TOTAL CA: CPT

## 2024-10-29 PROCEDURE — 99223 1ST HOSP IP/OBS HIGH 75: CPT | Performed by: INTERNAL MEDICINE

## 2024-10-29 PROCEDURE — 85025 COMPLETE CBC W/AUTO DIFF WBC: CPT

## 2024-10-29 PROCEDURE — 83735 ASSAY OF MAGNESIUM: CPT

## 2024-10-29 PROCEDURE — 93010 ELECTROCARDIOGRAM REPORT: CPT | Performed by: INTERNAL MEDICINE

## 2024-10-29 PROCEDURE — 36415 COLL VENOUS BLD VENIPUNCTURE: CPT

## 2024-10-29 PROCEDURE — 2060000000 HC ICU INTERMEDIATE R&B

## 2024-10-29 PROCEDURE — 6370000000 HC RX 637 (ALT 250 FOR IP)

## 2024-10-29 PROCEDURE — 99222 1ST HOSP IP/OBS MODERATE 55: CPT | Performed by: ORTHOPAEDIC SURGERY

## 2024-10-29 PROCEDURE — 2580000003 HC RX 258

## 2024-10-29 RX ORDER — POTASSIUM CHLORIDE 1500 MG/1
40 TABLET, EXTENDED RELEASE ORAL ONCE
Status: COMPLETED | OUTPATIENT
Start: 2024-10-29 | End: 2024-10-29

## 2024-10-29 RX ORDER — MAGNESIUM SULFATE HEPTAHYDRATE 40 MG/ML
2000 INJECTION, SOLUTION INTRAVENOUS ONCE
Status: COMPLETED | OUTPATIENT
Start: 2024-10-29 | End: 2024-10-29

## 2024-10-29 RX ADMIN — SODIUM CHLORIDE, PRESERVATIVE FREE 10 ML: 5 INJECTION INTRAVENOUS at 21:26

## 2024-10-29 RX ADMIN — THIAMINE HCL TAB 100 MG 100 MG: 100 TAB at 10:17

## 2024-10-29 RX ADMIN — LACTULOSE 30 G: 20 SOLUTION ORAL at 10:17

## 2024-10-29 RX ADMIN — ATORVASTATIN CALCIUM 40 MG: 40 TABLET, FILM COATED ORAL at 21:24

## 2024-10-29 RX ADMIN — FERROUS SULFATE TAB 325 MG (65 MG ELEMENTAL FE) 325 MG: 325 (65 FE) TAB at 21:24

## 2024-10-29 RX ADMIN — PANTOPRAZOLE SODIUM 40 MG: 40 TABLET, DELAYED RELEASE ORAL at 21:24

## 2024-10-29 RX ADMIN — MAGNESIUM SULFATE HEPTAHYDRATE 2000 MG: 40 INJECTION, SOLUTION INTRAVENOUS at 08:23

## 2024-10-29 RX ADMIN — POTASSIUM CHLORIDE 40 MEQ: 1500 TABLET, EXTENDED RELEASE ORAL at 10:16

## 2024-10-29 RX ADMIN — FERROUS SULFATE TAB 325 MG (65 MG ELEMENTAL FE) 325 MG: 325 (65 FE) TAB at 10:16

## 2024-10-29 RX ADMIN — DULOXETINE HYDROCHLORIDE 30 MG: 30 CAPSULE, DELAYED RELEASE ORAL at 10:17

## 2024-10-29 RX ADMIN — HYDROCODONE BITARTRATE AND ACETAMINOPHEN 1 TABLET: 5; 325 TABLET ORAL at 04:25

## 2024-10-29 RX ADMIN — PANTOPRAZOLE SODIUM 40 MG: 40 TABLET, DELAYED RELEASE ORAL at 10:17

## 2024-10-29 RX ADMIN — METOPROLOL TARTRATE 12.5 MG: 25 TABLET, FILM COATED ORAL at 21:24

## 2024-10-29 RX ADMIN — LORAZEPAM 2 MG: 2 INJECTION INTRAMUSCULAR; INTRAVENOUS at 21:24

## 2024-10-29 RX ADMIN — LORAZEPAM 2 MG: 2 INJECTION INTRAMUSCULAR; INTRAVENOUS at 16:17

## 2024-10-29 RX ADMIN — METOPROLOL TARTRATE 12.5 MG: 25 TABLET, FILM COATED ORAL at 10:17

## 2024-10-29 RX ADMIN — LORAZEPAM 3 MG: 1 TABLET ORAL at 04:25

## 2024-10-29 RX ADMIN — HYDROCODONE BITARTRATE AND ACETAMINOPHEN 1 TABLET: 5; 325 TABLET ORAL at 21:24

## 2024-10-29 RX ADMIN — SODIUM CHLORIDE, PRESERVATIVE FREE 10 ML: 5 INJECTION INTRAVENOUS at 08:25

## 2024-10-29 RX ADMIN — LACTULOSE 30 G: 20 SOLUTION ORAL at 14:02

## 2024-10-29 RX ADMIN — FOLIC ACID 1 MG: 1 TABLET ORAL at 10:17

## 2024-10-29 RX ADMIN — LORAZEPAM 2 MG: 1 TABLET ORAL at 02:12

## 2024-10-29 ASSESSMENT — PAIN DESCRIPTION - DESCRIPTORS
DESCRIPTORS: ACHING
DESCRIPTORS: ACHING

## 2024-10-29 ASSESSMENT — PAIN SCALES - GENERAL
PAINLEVEL_OUTOF10: 3
PAINLEVEL_OUTOF10: 0
PAINLEVEL_OUTOF10: 8
PAINLEVEL_OUTOF10: 2

## 2024-10-29 ASSESSMENT — PAIN DESCRIPTION - ORIENTATION
ORIENTATION: LOWER
ORIENTATION: LOWER
ORIENTATION: ANTERIOR;MID

## 2024-10-29 ASSESSMENT — PAIN DESCRIPTION - PAIN TYPE: TYPE: CHRONIC PAIN

## 2024-10-29 ASSESSMENT — PAIN DESCRIPTION - LOCATION
LOCATION: ABDOMEN
LOCATION: BACK

## 2024-10-29 ASSESSMENT — ENCOUNTER SYMPTOMS: BACK PAIN: 1

## 2024-10-29 NOTE — PROGRESS NOTES
thecal sac is not stenotic. There is mild facet and ligamentum flavum hypertrophy.  There is mild narrowing of the right neural foramen. L4-L5: There is a broad-based disc bulge measuring 3-4 mm with small osteophytes. There is moderate facet and ligamentum flavum hypertrophy.  The thecal sac is mildly stenotic. Disc and/or osteophytes result in moderate narrowing of the neural foramina bilaterally. The left neural foramen is narrowed greater than right. L5-S1: There is a broad-based disc bulge measuring 4-5 mm with osteophytes. There is moderate facet and ligamentum flavum hypertrophy.  There is moderate to severe narrowing of the left neural foramen.  There is moderate narrowing of the right neural foramen.  The thecal sac is not stenotic. There is mild facet and ligamentum flavum hypertrophy.     Kyphoplasty at T12.  Benign remote compression fracture of L1. New acute compression fracture of the superior endplate of L2 without retropulsion of the vertebral body into the spinal canal. Retrolisthesis of L1 on L2.  Disc and osteophytes result in narrowing of the neural foramina throughout the lumbar region as discussed above.  Mild stenosis of the thecal sac at L4-5.     CT ABDOMEN PELVIS W IV CONTRAST Additional Contrast? None    Result Date: 10/11/2024  EXAMINATION: CT OF THE ABDOMEN AND PELVIS WITH CONTRAST 10/11/2024 1:29 pm TECHNIQUE: CT of the abdomen and pelvis was performed with the administration of intravenous contrast. Multiplanar reformatted images are provided for review. Automated exposure control, iterative reconstruction, and/or weight based adjustment of the mA/kV was utilized to reduce the radiation dose to as low as reasonably achievable. COMPARISON: July 20, 2024 HISTORY: ORDERING SYSTEM PROVIDED HISTORY: Other postprocedural complications of skin and subcutaneous tissue TECHNOLOGIST PROVIDED HISTORY: STAT Creatinine as needed:->No Reason for Exam: postprocedural complications of skin and  normal in October 2024  Patient denies hemoptysis, melena  Follow CBC     Liver cirrhosis, s/p TIPS  Patient has history of alcoholism and hepatitis C  Patient has history of multiple GI bleeds and ascites  S/p TIPS in 2022, follows with GI  Lactulose 3 times as needed; patient has 2 bowel movements on average per day     A-fib with RVR  First captured in April 2024  Patient was reverted to sinus rhythm  On metoprolol 12.5 twice daily  ECG on admission showed sinus rhythm     History of adenocarcinoma of esophagus  S/p radiotherapy  EGD on June 2024 showed severe esophagitis grade D      hyperlipidemia  On Lipitor 40 mg     GI prophylaxis, Protonix 40 mg     DVT prophylaxis, Lovenox 40 mg     Code, full code        Plan will be discussed with the attending,       Kathy Ontiveros MD  PGY I transitional year resident  10/29/2024 7:56 AM     Attending Physician Statement  I have discussed the care of Frank Lisa and I have examined the patient myself and taken ROS and HPI, including pertinent history and exam findings, with the resident. I have reviewed the key elements of all parts of the encounter with the resident.  I agree with the assessment, plan and orders as documented by the resident.  Patient, has history of cirrhosis of liver status post TIPS, A-fib, not on anticoagulation with history of recurrent falls GI bleed  History of adenocarcinoma of esophagus  Admitted with altered mental status, patient at the time my evaluation very confused going through withdrawal  Had T11 fracture  Orthopedic surgery consulted  On CIWA protocol  Replacing electrolytes to keep potassium more than 4 and magnesium more than 2  Patient has extensive workup including MRI brain, EEG done earlier this month during his previous hospitalization was evaluated by neuro,  Electronically signed by Roberto Workman MD

## 2024-10-29 NOTE — CARE COORDINATION
Case Management Assessment  Initial Evaluation    Date/Time of Evaluation: 10/29/2024 4:11 PM  Assessment Completed by: Dianne Jarquin RN    If patient is discharged prior to next notation, then this note serves as note for discharge by case management.    Patient Name: Frank Lisa                   YOB: 1960  Diagnosis: Hypokalemia [E87.6]  Hypomagnesemia [E83.42]  Alcohol withdrawal syndrome with complication ([facility]) [F10.939]  Acute midline low back pain without sciatica [M54.50]  Alcohol abuse with withdrawal (HCC) [F10.139]  Compression fracture of thoracic vertebra, unspecified thoracic vertebral level, initial encounter ([facility]) [S22.000A]                   Date / Time: 10/28/2024 11:29 AM    Patient Admission Status: Inpatient   Readmission Risk (Low < 19, Mod (19-27), High > 27): Readmission Risk Score: 35.7    Current PCP: Lopez Rosario PA  PCP verified by CM? Yes    Chart Reviewed: Yes      History Provided by: Medical Record  Patient Orientation: Other (see comment) (sleeping, would not wake to answer questions)    Patient Cognition:  sleeping    Hospitalization in the last 30 days (Readmission):  Yes    If yes, Readmission Assessment in  Navigator will be completed.      Readmission Assessment  Number of Days since last admission?: 8-30 days  Previous Disposition: Home with Home Health  Who is being Interviewed: (S) Unable to Complete (patient sleeping and not answering questions)  What was the patient's/caregiver's perception as to why they think they needed to return back to the hospital?: (S) Other (Comment) (patient fell at home. elevated alcohol levels.)  Did you visit your Primary Care Physician after you left the hospital, before you returned this time?: No  Why weren't you able to visit your PCP?: Did not have an appointment  Did you see a specialist, such as Cardiac, Pulmonary, Orthopedic Physician, etc. after you left the hospital?: Yes  Who advised the patient to return  to the hospital?: Self-referral  Does the patient report anything that got in the way of taking their medications?: No  In our efforts to provide the best possible care to you and others like you, can you think of anything that we could have done to help you after you left the hospital the first time, so that you might not have needed to return so soon?: (S) Other (Comment) (SNF offered last admit, only agreeable to [facility].)   Advance Directives:      Code Status: Full Code   Patient's Primary Decision Maker is: Named in Scanned ACP Document    Primary Decision Maker: Nilam Lisa - Ex-Spouse - [phone]    Discharge Planning:    Patient lives with: Alone Type of Home: House  Primary Care Giver: Self  Patient Support Systems include: (S) Spouse/Significant Other, Home Care Staff ([employer] SPOUSE)   Current Financial resources: Medicare, Medicaid  Current community resources: (S) ECF/Home Care (Current with [facility])  Current services prior to admission: Durable Medical Equipment, Home Care            Current DME: Cane, Walker            Type of Home Care services:  OT, PT, Nursing Services    ADLS  Prior functional level: Assistance with the following:, Shopping, Housework, Cooking, Independent in ADLs/IADLs  Current functional level: (S) Other (see comment) (TBD)    PT AM-PAC:   /24  OT AM-PAC:   /24    Family can provide assistance at DC: No  Would you like Case Management to discuss the discharge plan with any other family members/significant others, and if so, who? No  Plans to Return to Present Housing: Unknown at present  Other Identified Issues/Barriers to RETURNING to current housing: weakness, multiple falls, lives alone  Potential Assistance needed at discharge: Home Care, Skilled Nursing Facility, Outpatient PT/OT            Potential DME:  TBD  Patient expects to discharge to: House  Plan for transportation at discharge: Wheelchair Van    Financial    Payor: [employer] / Plan:

## 2024-10-29 NOTE — PLAN OF CARE
Problem: Safety - Adult  Goal: Free from fall injury  Outcome: Progressing  Flowsheets (Taken 10/29/2024 0815)  Free From Fall Injury:   Instruct family/caregiver on patient safety   Based on caregiver fall risk screen, instruct family/caregiver to ask for assistance with transferring infant if caregiver noted to have fall risk factors     Problem: Discharge Planning  Goal: Discharge to home or other facility with appropriate resources  Outcome: Progressing  Flowsheets (Taken 10/29/2024 0815)  Discharge to home or other facility with appropriate resources:   Identify barriers to discharge with patient and caregiver   Arrange for needed discharge resources and transportation as appropriate   Identify discharge learning needs (meds, wound care, etc)   Arrange for interpreters to assist at discharge as needed   Refer to discharge planning if patient needs post-hospital services based on physician order or complex needs related to functional status, cognitive ability or social support system     Problem: ABCDS Injury Assessment  Goal: Absence of physical injury  Outcome: Progressing  Flowsheets (Taken 10/29/2024 0815)  Absence of Physical Injury: Implement safety measures based on patient assessment     Problem: Skin/Tissue Integrity  Goal: Absence of new skin breakdown  Description: 1.  Monitor for areas of redness and/or skin breakdown  2.  Assess vascular access sites hourly  3.  Every 4-6 hours minimum:  Change oxygen saturation probe site  4.  Every 4-6 hours:  If on nasal continuous positive airway pressure, respiratory therapy assess nares and determine need for appliance change or resting period.  Outcome: Progressing     Problem: Pain  Goal: Verbalizes/displays adequate comfort level or baseline comfort level  Outcome: Progressing  Flowsheets (Taken 10/29/2024 0945)  Verbalizes/displays adequate comfort level or baseline comfort level:   Encourage patient to monitor pain and request assistance   Administer  analgesics based on type and severity of pain and evaluate response   Assess pain using appropriate pain scale   Implement non-pharmacological measures as appropriate and evaluate response   Consider cultural and social influences on pain and pain management   Notify Licensed Independent Practitioner if interventions unsuccessful or patient reports new pain

## 2024-10-29 NOTE — PLAN OF CARE
Problem: Safety - Adult  Goal: Free from fall injury  Outcome: Progressing  Flowsheets (Taken 10/29/2024 0142)  Free From Fall Injury: Instruct family/caregiver on patient safety     Problem: Discharge Planning  Goal: Discharge to home or other facility with appropriate resources  Outcome: Progressing  Flowsheets (Taken 10/29/2024 0142)  Discharge to home or other facility with appropriate resources:   Identify barriers to discharge with patient and caregiver   Arrange for needed discharge resources and transportation as appropriate   Identify discharge learning needs (meds, wound care, etc)     Problem: ABCDS Injury Assessment  Goal: Absence of physical injury  Outcome: Progressing  Flowsheets (Taken 10/29/2024 0142)  Absence of Physical Injury: Implement safety measures based on patient assessment     Problem: Skin/Tissue Integrity  Goal: Absence of new skin breakdown  Description: 1.  Monitor for areas of redness and/or skin breakdown  2.  Assess vascular access sites hourly  3.  Every 4-6 hours minimum:  Change oxygen saturation probe site  4.  Every 4-6 hours:  If on nasal continuous positive airway pressure, respiratory therapy assess nares and determine need for appliance change or resting period.  Outcome: Progressing

## 2024-10-29 NOTE — PROGRESS NOTES
10/29/24 1412   Encounter Summary   Encounter Overview/Reason Initial Encounter   Service Provided For Patient not available  (Staff with PT)

## 2024-10-29 NOTE — CONSULTS
Inpatient consult to Orthopedic Surgery  Consult performed by: Júnior Cueva MD  Consult ordered by: Quinn Cage MD        Patient: Frank Lisa  Unit/Bed: 2089/2089-01  YOB: 1960  MRN: [id number]  Acct: [id number]   Admitting Diagnosis: Hypokalemia [E87.6]  Hypomagnesemia [E83.42]  Alcohol withdrawal syndrome with complication ([facility]) [F10.939]  Acute midline low back pain without sciatica [M54.50]  Alcohol abuse with withdrawal ([facility]) [F10.139]  Compression fracture of thoracic vertebra, unspecified thoracic vertebral level, initial encounter ([facility]) [S22.000A]  Admit Date:  10/28/2024  Hospital Day: 1    Subjective:    Patient is having problems with  back pain  Back Pain      Patient Seen, Chart, Labs, Radiologystudies, and Consults reviewed.    Patient presented with back pain and evidence of alcohol withdrawal.    Patient admitted to medicine    I believe the patient has been started on lorazepam at this time he is arousable but not to the point where he will wake up and voluntarily talk    Patient with recent MRI and recent kyphoplasty    Objective:   /60   Pulse 79   Temp 98.3 °F (36.8 °C) (Oral)   Resp 18   Ht 1.778 m (5' 10\")   Wt 82 kg (180 lb 12.4 oz)   SpO2 99%   BMI 25.94 kg/m²   Intake/Output Summary (Last 24 hours) at 10/29/2024 0754  Last data filed at 10/28/2024 1916  Gross per 24 hour   Intake --   Output 200 ml   Net -200 ml     Review of Systems   Musculoskeletal:  Positive for back pain.     Physical Exam  Vitals and nursing note reviewed.   Constitutional:       Appearance: He is well-developed.   HENT:      Head: Normocephalic and atraumatic.      Nose: Nose normal.   Eyes:      Conjunctiva/sclera: Conjunctivae normal.   Pulmonary:      Effort: Pulmonary effort is normal. No respiratory distress.   Musculoskeletal:      Cervical back: Normal range of motion and neck supple.      Comments: Appears to move all extremities when aroused     Skin:     General: Skin  (HCC)  Active Problems:    Back pain, chronic    Cubital tunnel syndrome of both upper extremities    T12 compression fracture, sequela    Closed compression fracture of L1 lumbar vertebra, sequela    Closed wedge compression fracture of T10 vertebra (HCC)    Avascular necrosis of left femur    Spinal stenosis in cervical region    Non-traumatic compression fracture of T11 thoracic vertebra, sequela  Resolved Problems:    * No resolved hospital problems. *    Unable to assess pain level at this time appears patient ran out of narcotics    Historically the patient has not been complaining of his AVN of his left hip    Patient has had multiple medical problems this year with hospital admissions causing cancellation of his cervical surgery and he has had kyphoplasties in the interim    Electronically signed by Júnior Cueva MD on 10/29/2024 at 7:54 AM    Rounding Hospitalist

## 2024-10-30 LAB
ANION GAP SERPL CALCULATED.3IONS-SCNC: 9 MMOL/L (ref 9–16)
BASOPHILS # BLD: 0.07 K/UL (ref 0–0.2)
BASOPHILS NFR BLD: 1 % (ref 0–2)
BUN SERPL-MCNC: 6 MG/DL (ref 8–23)
CALCIUM SERPL-MCNC: 8.7 MG/DL (ref 8.6–10.4)
CHLORIDE SERPL-SCNC: 103 MMOL/L (ref 98–107)
CO2 SERPL-SCNC: 26 MMOL/L (ref 20–31)
CREAT SERPL-MCNC: 1 MG/DL (ref 0.7–1.2)
EOSINOPHIL # BLD: 0.2 K/UL (ref 0–0.4)
EOSINOPHILS RELATIVE PERCENT: 3 % (ref 0–4)
ERYTHROCYTE [DISTWIDTH] IN BLOOD BY AUTOMATED COUNT: 14.9 % (ref 11.5–14.9)
GFR, ESTIMATED: 84 ML/MIN/1.73M2
GLUCOSE SERPL-MCNC: 85 MG/DL (ref 74–99)
HCT VFR BLD AUTO: 26.8 % (ref 41–53)
HGB BLD-MCNC: 9.4 G/DL (ref 13.5–17.5)
LYMPHOCYTES NFR BLD: 0.86 K/UL (ref 1–4.8)
LYMPHOCYTES RELATIVE PERCENT: 13 % (ref 24–44)
MAGNESIUM SERPL-MCNC: 1.6 MG/DL (ref 1.6–2.4)
MCH RBC QN AUTO: 31.3 PG (ref 26–34)
MCHC RBC AUTO-ENTMCNC: 35.3 G/DL (ref 31–37)
MCV RBC AUTO: 88.8 FL (ref 80–100)
MONOCYTES NFR BLD: 1.32 K/UL (ref 0.1–1.3)
MONOCYTES NFR BLD: 20 % (ref 1–7)
MORPHOLOGY: NORMAL
NEUTROPHILS NFR BLD: 63 % (ref 36–66)
NEUTS SEG NFR BLD: 4.15 K/UL (ref 1.3–9.1)
PLATELET # BLD AUTO: 111 K/UL (ref 150–450)
PMV BLD AUTO: 7.1 FL (ref 6–12)
POTASSIUM SERPL-SCNC: 3.3 MMOL/L (ref 3.7–5.3)
RBC # BLD AUTO: 3.01 M/UL (ref 4.5–5.9)
SODIUM SERPL-SCNC: 138 MMOL/L (ref 136–145)
WBC OTHER # BLD: 6.6 K/UL (ref 3.5–11)

## 2024-10-30 PROCEDURE — 85025 COMPLETE CBC W/AUTO DIFF WBC: CPT

## 2024-10-30 PROCEDURE — 6370000000 HC RX 637 (ALT 250 FOR IP)

## 2024-10-30 PROCEDURE — 6360000002 HC RX W HCPCS

## 2024-10-30 PROCEDURE — 83735 ASSAY OF MAGNESIUM: CPT

## 2024-10-30 PROCEDURE — 2580000003 HC RX 258

## 2024-10-30 PROCEDURE — 36415 COLL VENOUS BLD VENIPUNCTURE: CPT

## 2024-10-30 PROCEDURE — 99233 SBSQ HOSP IP/OBS HIGH 50: CPT | Performed by: INTERNAL MEDICINE

## 2024-10-30 PROCEDURE — 6370000000 HC RX 637 (ALT 250 FOR IP): Performed by: INTERNAL MEDICINE

## 2024-10-30 PROCEDURE — 80048 BASIC METABOLIC PNL TOTAL CA: CPT

## 2024-10-30 PROCEDURE — 2060000000 HC ICU INTERMEDIATE R&B

## 2024-10-30 RX ORDER — POTASSIUM CHLORIDE 1500 MG/1
40 TABLET, EXTENDED RELEASE ORAL ONCE
Status: COMPLETED | OUTPATIENT
Start: 2024-10-30 | End: 2024-10-30

## 2024-10-30 RX ADMIN — SODIUM CHLORIDE, PRESERVATIVE FREE 10 ML: 5 INJECTION INTRAVENOUS at 21:01

## 2024-10-30 RX ADMIN — LORAZEPAM 1 MG: 2 INJECTION INTRAMUSCULAR; INTRAVENOUS at 10:53

## 2024-10-30 RX ADMIN — SODIUM CHLORIDE, PRESERVATIVE FREE 10 ML: 5 INJECTION INTRAVENOUS at 09:03

## 2024-10-30 RX ADMIN — METOPROLOL TARTRATE 12.5 MG: 25 TABLET, FILM COATED ORAL at 09:02

## 2024-10-30 RX ADMIN — LACTULOSE 30 G: 20 SOLUTION ORAL at 14:08

## 2024-10-30 RX ADMIN — METOPROLOL TARTRATE 12.5 MG: 25 TABLET, FILM COATED ORAL at 20:59

## 2024-10-30 RX ADMIN — THIAMINE HCL TAB 100 MG 100 MG: 100 TAB at 09:02

## 2024-10-30 RX ADMIN — LORAZEPAM 4 MG: 2 INJECTION INTRAMUSCULAR; INTRAVENOUS at 01:34

## 2024-10-30 RX ADMIN — ATORVASTATIN CALCIUM 40 MG: 40 TABLET, FILM COATED ORAL at 20:59

## 2024-10-30 RX ADMIN — PANTOPRAZOLE SODIUM 40 MG: 40 TABLET, DELAYED RELEASE ORAL at 09:02

## 2024-10-30 RX ADMIN — POTASSIUM CHLORIDE 40 MEQ: 1500 TABLET, EXTENDED RELEASE ORAL at 14:04

## 2024-10-30 RX ADMIN — LORAZEPAM 2 MG: 1 TABLET ORAL at 20:59

## 2024-10-30 RX ADMIN — FOLIC ACID 1 MG: 1 TABLET ORAL at 09:02

## 2024-10-30 RX ADMIN — DULOXETINE HYDROCHLORIDE 30 MG: 30 CAPSULE, DELAYED RELEASE ORAL at 09:02

## 2024-10-30 RX ADMIN — PANTOPRAZOLE SODIUM 40 MG: 40 TABLET, DELAYED RELEASE ORAL at 20:59

## 2024-10-30 RX ADMIN — FERROUS SULFATE TAB 325 MG (65 MG ELEMENTAL FE) 325 MG: 325 (65 FE) TAB at 20:59

## 2024-10-30 RX ADMIN — FERROUS SULFATE TAB 325 MG (65 MG ELEMENTAL FE) 325 MG: 325 (65 FE) TAB at 09:02

## 2024-10-30 ASSESSMENT — PAIN SCALES - GENERAL: PAINLEVEL_OUTOF10: 0

## 2024-10-30 NOTE — PROGRESS NOTES
[facility]  IN-PATIENT SERVICE  [facility]    PROGRESS NOTE             10/30/2024    12:30 PM    Name:   Frank Lisa  MRN:     [id number]     Acct:      [id number]   Room:   2089/2089-01   Day:  2  Admit Date:  10/28/2024 11:29 AM    PCP:  Lopez Rosario PA  Code Status:  Full Code    Subjective:     C/C:   Chief Complaint   Patient presents with    Back Pain     Interval History Status: improved.  Patient clinically doing better   Patient required Ativan only once 1 mg   On lactulose   Poor oral intake       Brief History:     Patient is a 64-year-old male who presents for back pain.  Patient has significant past medical history of cirrhosis s/p TIPS, A-fib with RVR (converted to sinus rhythm and April 2024 (, history of cecal adenocarcinoma/PE s/p radiation therapy, history of GI bleeds/gastroesophageal varices, hepatitis C treated with Harvoni, history of bowel perforation requiring right hemicolectomy with end ileostomy reversed on October 2023, esophagitis grade D, T3, T11,L1 kyphoplasty, T12 compression fracture.     The patient presented this afternoon after not being able to get up from bed after waking up.  His last drink was around 11:30 PM the day before.  He said that he felt chest pain due to his heart beating a lot, he has been nauseated, both his hands and legs are tremulous.  He said that he had back pain similar to the back pain he had 1 month ago with a compression fracture, and he believes that his back pain is from that.  He rates it as 9-10 out of 10.  He denies cough, change in bowel habits, abdominal pain.  He denies vomiting, shortness of breath, abdominal pain.  Patient's vital signs show tachycardia heart rate 115, blood pressure 155/84.  Patient has bilateral upper and lower tremors, and mid back pain increased with leg flexion.      CBC significant for anemia hemoglobin 9.6 (baseline 9.2), platelets 141  CMP significant for  [S22.070A] 10/29/2024    Avascular necrosis of left femur [M87.052] 10/29/2024    Spinal stenosis in cervical region [M48.02] 10/29/2024    Non-traumatic compression fracture of T11 thoracic vertebra, sequela [M48.54XS] 10/29/2024    Alcohol abuse with withdrawal (HCC) [F10.139] 10/28/2024    Closed compression fracture of L1 lumbar vertebra, sequela [S32.010S] 10/17/2024    T12 compression fracture, sequela [S22.080S] 10/02/2024    Cubital tunnel syndrome of both upper extremities [G56.23] 11/28/2023    Back pain, chronic [M54.9, G89.29] 04/19/2012       Plan:        Patient status Admit as inpatient in the  Progressive Unit/Step down     Altered mental status, most probably secondary to alcohol withdrawal  History of alcoholism, cirrhosis, presented with tremors and tachycardia heart rate 115  Patient has a history of previous admission for altered mental status for alcohol withdrawal  7/11/1977  Patient was given Ativan and morphine  Decision to admit patient for altered mental status  CIWA protocol in place, patient given thiamine     Back pain/history of vertebral fractures  Patient had a history of multiple vertebral fractures and kyphoplasty  L1 kyphoplasty done a few weeks ago  Patient said that the pain is similar to the pain he had before doing his last surgery  CT lumbar shows compression deformity of L1  Will consult orthopedic surgery, patient given Norco 4 times a day    Electrolyte abnormality  Patient presented with potassium 3.3, magnesium 1.5, replaced  Potassium 3.5 magnesium 1.8 on 10/29, will replace  Follow BMP     Anemia  Baseline hemoglobin 9.2  Hemoglobin on presentation 9.6  Vitamin B12, folate normal in October 2024  Patient denies hemoptysis, melena  Follow CBC     Liver cirrhosis, s/p TIPS  Patient has history of alcoholism and hepatitis C  Patient has history of multiple GI bleeds and ascites  S/p TIPS in 2022, follows with GI  Lactulose 3 times as needed; patient has 2 bowel movements

## 2024-10-30 NOTE — CARE COORDINATION
ONGOING DISCHARGE PLAN:    Patient is alert and oriented x4.    Spoke with patient regarding discharge plan and patient confirms that plan is agreeable to SNF. Referral sent to [facility], facility he prefers.    Requested PT/OT from Dr Jacinta STEPHENS    Ortho consult      Will continue to follow for additional discharge needs.    If patient is discharged prior to next notation, then this note serves as note for discharge by case management.    Electronically signed by Dianne Jarquin RN on 10/30/2024 at 9:17 AM

## 2024-10-30 NOTE — PROGRESS NOTES
Surgical Progress Note    POD:      Patient doing fairly well  Vitals:    10/30/24 0759   BP: (!) 146/65   Pulse: 75   Resp: 16   Temp: 98.2 °F (36.8 °C)   SpO2: 93%      Temp (24hrs), Av.1 °F (36.7 °C), Min:97.8 °F (36.6 °C), Max:98.6 °F (37 °C)       Pain Control ?  No unusual nausea    Exam: sleepy        Lungs:  No respiratory distress    Labs reviewed:  Labs:  WBC/Hgb/Hct/Plts:  6.6/9.4/26.8/111 (10/30 0546)  BUN/Cr/glu/ALT/AST/amyl/lip:  6/1.0/--/--/--/--/-- (10/30 0546)  PT/INR/PTT:  16.9/1.3/-- (10/28 1231)  Na/K/Cl/CO2:  138/3.3/103/26 (10/30 0546)    I/O last 3 completed shifts:  In: 40 [P.O.:40]  Out: 200 [Urine:200]    Assessment:    Patient Active Problem List   Diagnosis    Back pain, chronic    Hearing difficulty    GERD (gastroesophageal reflux disease)    Cervical radicular pain    Hypomagnesemia    Chronic viral hepatitis B without delta agent and without coma (HCC)    Calculus of gallbladder without cholecystitis    Hep C w/o coma, chronic (HCC)    Fatty liver    Psychophysiologic insomnia    Cirrhosis (HCC)    Decompensation of cirrhosis of liver (HCC)    Vertebrogenic low back pain    DDD (degenerative disc disease), lumbar    Depression    Tubular adenoma of colon    History of colon polyps    Gynecomastia, male    Lumbar disc herniation    Tinnitus    Eustachian tube dysfunction    Ganglion cyst    Carpal tunnel syndrome of right wrist    History of hepatitis C    Vitamin D deficiency    Pure hypercholesterolemia    Hypokalemia    Essential hypertension    Recurrent major depressive disorder in partial remission (HCC)    S/P epidural steroid injection    Elevated LFTs    Seasonal allergies    Lumbar facet arthropathy    Cervical facet syndrome    Thrombocytopenia (HCC)    Hepatitis C virus infection resolved after antiviral drug therapy    Alcohol abuse    Altered mental status    Hypocalcemia    Hypophosphatemia    Malignant neoplasm of lower third of esophagus (HCC)    COVID-19

## 2024-10-30 NOTE — PLAN OF CARE
Problem: Discharge Planning  Goal: Discharge to home or other facility with appropriate resources  10/30/2024 0646 by Lilo Hopkins, RN  Outcome: Progressing  Flowsheets (Taken 10/29/2024 2045)  Discharge to home or other facility with appropriate resources: Identify barriers to discharge with patient and caregiver     Problem: ABCDS Injury Assessment  Goal: Absence of physical injury  10/30/2024 0646 by Lilo Hopkins, RN  Outcome: Progressing     Problem: Skin/Tissue Integrity  Goal: Absence of new skin breakdown  Description: 1.  Monitor for areas of redness and/or skin breakdown  2.  Assess vascular access sites hourly  3.  Every 4-6 hours minimum:  Change oxygen saturation probe site  4.  Every 4-6 hours:  If on nasal continuous positive airway pressure, respiratory therapy assess nares and determine need for appliance change or resting period.  10/30/2024 0646 by Lilo Hopkins, RN  Outcome: Progressing

## 2024-10-30 NOTE — PLAN OF CARE
Problem: Safety - Adult  Goal: Free from fall injury  10/30/2024 1556 by Adrienne Louie RN  Outcome: Progressing     Problem: Discharge Planning  Goal: Discharge to home or other facility with appropriate resources  10/30/2024 1556 by Adrienne Louie RN  Outcome: Progressing     Problem: ABCDS Injury Assessment  Goal: Absence of physical injury  10/30/2024 1556 by Adrienne Louie RN  Outcome: Progressing    Problem: ABCDS Injury Assessment  Goal: Absence of physical injury  10/30/2024 1556 by Adrienne Louie RN  Outcome: Progressing     Problem: Skin/Tissue Integrity  Goal: Absence of new skin breakdown  Description: 1.  Monitor for areas of redness and/or skin breakdown  2.  Assess vascular access sites hourly  3.  Every 4-6 hours minimum:  Change oxygen saturation probe site  4.  Every 4-6 hours:  If on nasal continuous positive airway pressure, respiratory therapy assess nares and determine need for appliance change or resting period.  10/30/2024 1556 by Adrienne Louie RN  Outcome: Progressing     Problem: Pain  Goal: Verbalizes/displays adequate comfort level or baseline comfort level  10/30/2024 1556 by Adrienne Louie RN  Outcome: Progressing

## 2024-10-31 LAB
ANION GAP SERPL CALCULATED.3IONS-SCNC: 9 MMOL/L (ref 9–16)
BASOPHILS # BLD: 0 K/UL (ref 0–0.2)
BASOPHILS NFR BLD: 0 % (ref 0–2)
BUN SERPL-MCNC: 6 MG/DL (ref 8–23)
CALCIUM SERPL-MCNC: 8.7 MG/DL (ref 8.6–10.4)
CHLORIDE SERPL-SCNC: 106 MMOL/L (ref 98–107)
CO2 SERPL-SCNC: 25 MMOL/L (ref 20–31)
CREAT SERPL-MCNC: 1 MG/DL (ref 0.7–1.2)
EOSINOPHIL # BLD: 0.2 K/UL (ref 0–0.4)
EOSINOPHILS RELATIVE PERCENT: 3 % (ref 0–4)
ERYTHROCYTE [DISTWIDTH] IN BLOOD BY AUTOMATED COUNT: 15.3 % (ref 11.5–14.9)
GFR, ESTIMATED: 84 ML/MIN/1.73M2
GLUCOSE SERPL-MCNC: 79 MG/DL (ref 74–99)
HCT VFR BLD AUTO: 26.3 % (ref 41–53)
HGB BLD-MCNC: 9.1 G/DL (ref 13.5–17.5)
LYMPHOCYTES NFR BLD: 1.5 K/UL (ref 1–4.8)
LYMPHOCYTES RELATIVE PERCENT: 22 % (ref 24–44)
MAGNESIUM SERPL-MCNC: 1.3 MG/DL (ref 1.6–2.4)
MCH RBC QN AUTO: 30.9 PG (ref 26–34)
MCHC RBC AUTO-ENTMCNC: 34.8 G/DL (ref 31–37)
MCV RBC AUTO: 88.7 FL (ref 80–100)
MONOCYTES NFR BLD: 0.75 K/UL (ref 0.1–1.3)
MONOCYTES NFR BLD: 11 % (ref 1–7)
MORPHOLOGY: ABNORMAL
NEUTROPHILS NFR BLD: 64 % (ref 36–66)
NEUTS SEG NFR BLD: 4.35 K/UL (ref 1.3–9.1)
PLATELET # BLD AUTO: 129 K/UL (ref 150–450)
PMV BLD AUTO: 7.2 FL (ref 6–12)
POTASSIUM SERPL-SCNC: 3.5 MMOL/L (ref 3.7–5.3)
RBC # BLD AUTO: 2.96 M/UL (ref 4.5–5.9)
SODIUM SERPL-SCNC: 140 MMOL/L (ref 136–145)
WBC OTHER # BLD: 6.8 K/UL (ref 3.5–11)

## 2024-10-31 PROCEDURE — 36415 COLL VENOUS BLD VENIPUNCTURE: CPT

## 2024-10-31 PROCEDURE — 2060000000 HC ICU INTERMEDIATE R&B

## 2024-10-31 PROCEDURE — 80048 BASIC METABOLIC PNL TOTAL CA: CPT

## 2024-10-31 PROCEDURE — 83735 ASSAY OF MAGNESIUM: CPT

## 2024-10-31 PROCEDURE — 97162 PT EVAL MOD COMPLEX 30 MIN: CPT

## 2024-10-31 PROCEDURE — 6370000000 HC RX 637 (ALT 250 FOR IP)

## 2024-10-31 PROCEDURE — 97166 OT EVAL MOD COMPLEX 45 MIN: CPT

## 2024-10-31 PROCEDURE — 99233 SBSQ HOSP IP/OBS HIGH 50: CPT | Performed by: INTERNAL MEDICINE

## 2024-10-31 PROCEDURE — 85025 COMPLETE CBC W/AUTO DIFF WBC: CPT

## 2024-10-31 PROCEDURE — 2580000003 HC RX 258

## 2024-10-31 PROCEDURE — 2500000003 HC RX 250 WO HCPCS

## 2024-10-31 RX ORDER — MIRTAZAPINE 15 MG/1
15 TABLET, ORALLY DISINTEGRATING ORAL NIGHTLY
Status: DISCONTINUED | OUTPATIENT
Start: 2024-10-31 | End: 2024-11-01 | Stop reason: HOSPADM

## 2024-10-31 RX ORDER — MAGNESIUM SULFATE HEPTAHYDRATE 40 MG/ML
2000 INJECTION, SOLUTION INTRAVENOUS ONCE
Status: DISCONTINUED | OUTPATIENT
Start: 2024-10-31 | End: 2024-11-01 | Stop reason: HOSPADM

## 2024-10-31 RX ADMIN — DULOXETINE HYDROCHLORIDE 30 MG: 30 CAPSULE, DELAYED RELEASE ORAL at 07:54

## 2024-10-31 RX ADMIN — POTASSIUM CHLORIDE 40 MEQ: 1500 TABLET, EXTENDED RELEASE ORAL at 07:54

## 2024-10-31 RX ADMIN — FERROUS SULFATE TAB 325 MG (65 MG ELEMENTAL FE) 325 MG: 325 (65 FE) TAB at 21:04

## 2024-10-31 RX ADMIN — ATORVASTATIN CALCIUM 40 MG: 40 TABLET, FILM COATED ORAL at 21:04

## 2024-10-31 RX ADMIN — MIRTAZAPINE 15 MG: 15 TABLET, ORALLY DISINTEGRATING ORAL at 21:08

## 2024-10-31 RX ADMIN — PANTOPRAZOLE SODIUM 40 MG: 40 TABLET, DELAYED RELEASE ORAL at 21:04

## 2024-10-31 RX ADMIN — FERROUS SULFATE TAB 325 MG (65 MG ELEMENTAL FE) 325 MG: 325 (65 FE) TAB at 07:54

## 2024-10-31 RX ADMIN — MAGNESIUM SULFATE HEPTAHYDRATE 2000 MG: 40 INJECTION, SOLUTION INTRAVENOUS at 10:23

## 2024-10-31 RX ADMIN — SODIUM CHLORIDE, PRESERVATIVE FREE 10 ML: 5 INJECTION INTRAVENOUS at 21:04

## 2024-10-31 RX ADMIN — THIAMINE HCL TAB 100 MG 100 MG: 100 TAB at 07:54

## 2024-10-31 RX ADMIN — SODIUM CHLORIDE, PRESERVATIVE FREE 10 ML: 5 INJECTION INTRAVENOUS at 07:54

## 2024-10-31 RX ADMIN — METOPROLOL TARTRATE 12.5 MG: 25 TABLET, FILM COATED ORAL at 21:04

## 2024-10-31 RX ADMIN — FOLIC ACID 1 MG: 1 TABLET ORAL at 07:54

## 2024-10-31 RX ADMIN — MAGNESIUM SULFATE HEPTAHYDRATE 2000 MG: 40 INJECTION, SOLUTION INTRAVENOUS at 08:06

## 2024-10-31 RX ADMIN — PANTOPRAZOLE SODIUM 40 MG: 40 TABLET, DELAYED RELEASE ORAL at 07:54

## 2024-10-31 RX ADMIN — METOPROLOL TARTRATE 12.5 MG: 25 TABLET, FILM COATED ORAL at 07:55

## 2024-10-31 NOTE — PROGRESS NOTES
[facility]   Occupational Therapy Evaluation  Date: 10/31/24  Patient Name: Frank Lisa       Room: -01  MRN: [id number]  Account: [id number]   : 1960  (64 y.o.) Gender: male     Discharge Recommendations:  Further Occupational Therapy is recommended upon facility discharge.    OT Equipment Recommendations  Other: TBD    Referring Practitioner: Roberto Workman MD  Diagnosis: Hypokalemia        Treatment Diagnosis: Impaired ADL status    Past Medical History:  has a past medical history of Abdominal pain, Abnormal EKG, Acute deep vein thrombosis (DVT) of brachial vein of right upper extremity (HCC), 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), Alcoholic cirrhosis of liver with ascites (HCC), AMS (altered mental status), Anemia, Anxiety, Arthritis, Back pain, chronic, Lezama esophagus, Bleeding gastric varices, Bleeds easily (HCC), Bowel perforation (HCC), BPH (benign prostatic hypertrophy), Bruises easily, Cholelithiasis, Cirrhosis (HCC), COVID-19, COVID-19 vaccine series completed, DDD (degenerative disc disease), lumbar, Depression, Dizziness, Esophageal adenocarcinoma (HCC), Esophageal cancer (HCC), Esophageal varices (HCC), Extremity numbness, Fatty liver, GERD (gastroesophageal reflux disease), GI bleed, Hallucinations, Hearing loss, Heart murmur, Hep C w/o coma, chronic (HCC), Hiatal hernia, History of alcohol abuse, History of blood transfusion, History of colon polyps, History of tobacco abuse, [place] (hard of hearing), Hyperlipidemia, Hypertension, Hyponatremia, Hypotension, Ileus (HCC), Insomnia, Jaundice, Joint pain, Leg pain, Loss of appetite, Lower extremity weakness, Lumbar radiculitis, Malignant neoplasm of esophagus (HCC), Mastoid disorder, bilateral, Neck pain, Pericardial effusion, Pneumonia, PONV (postoperative nausea and vomiting), Poor venous access, Port-A-Cath in place, Portal hypertension (HCC), SBO (small bowel obstruction) (HCC), Sciatica, Secondary esophageal  A Lot  How much help is needed for bathing (which includes washing, rinsing, drying)?: A Lot  How much help is needed for toileting (which includes using toilet, bedpan, or urinal)?: A Lot  How much help is needed for putting on and taking off regular upper body clothing?: A Little  How much help is needed for taking care of personal grooming?: A Little  How much help for eating meals?: A Little  AM-[facility] Inpatient Daily Activity Raw Score: 15  AM-P[facility] Inpatient ADL T-Scale Score : 34.69  ADL Inpatient CMS 0-100% Score: 56.46  ADL Inpatient CMS G-Code Modifier : CK       Goals     Short Term Goals  Time Frame for Short Term Goals: By D/C  Short Term Goal 1: Pt to demonstrate UB dressing/bathing techniques w/ Supervision for improved safety and independence in UB bathing/dressing tasks.  Short Term Goal 2: Pt to demonstrate LB dressing/bathing techniques w/ Yessica w/ AE/ Adaptive strategies as needed for improved safety and independence in LB bathing/dressing tasks.  Short Term Goal 3: Pt to stand for 5+ min w/ least restrictive device at CGA while completing task of choice for improved standing balance in ADLs and other related tasks.  Short Term Goal 4: Pt to complete functional mobility/ transfers at CGA w/ least restrictive device for improvedsafety and independence within the home/ SNF environment  Short Term Goal 5: Pt to complete 15 min of functional activity/ therex for improvements in self care and improve overall quality of life.  Short Term Goal 6: Pt will demonstrate/ verbalize good understanding of AE/ fall prevention strategies/ adaptive strategies for increased independence in self care strategies.    Plan  Occupational Therapy Plan  Times Per Week: 4-6x/ week  Current Treatment Recommendations: Strengthening, ROM, Balance training, Functional mobility training, Endurance training, Cognitive reorientation, Pain management, Safety education & training, Patient/Caregiver education & training, Equipment

## 2024-10-31 NOTE — PROGRESS NOTES
[facility]  IN-PATIENT SERVICE  [facility]    PROGRESS NOTE             10/31/2024    8:15 AM    Name:   Frank Lisa  MRN:     [id number]     Acct:      [id number]   Room:   2089/2089-01   Day:  3  Admit Date:  10/28/2024 11:29 AM    PCP:  Lopez Rosario PA  Code Status:  Full Code    Subjective:     C/C:   Chief Complaint   Patient presents with    Back Pain     Interval History Status:     -Vitals stable.  -Labs okay.  -Clinically, is alert, drowsy, oriented x3. Was non-cooperative overnight, was given 1 mg Ativan.   -Insurance authorization to start today.      Brief History:     Patient is a 64-year-old male who presents for back pain.  Patient has significant past medical history of cirrhosis s/p TIPS, A-fib with RVR (converted to sinus rhythm and April 2024 (, history of cecal adenocarcinoma/PE s/p radiation therapy, history of GI bleeds/gastroesophageal varices, hepatitis C treated with Harvoni, history of bowel perforation requiring right hemicolectomy with end ileostomy reversed on October 2023, esophagitis grade D, T3, T11,L1 kyphoplasty, T12 compression fracture.     The patient presented this afternoon after not being able to get up from bed after waking up.  His last drink was around 11:30 PM the day before.  He said that he felt chest pain due to his heart beating a lot, he has been nauseated, both his hands and legs are tremulous.  He said that he had back pain similar to the back pain he had 1 month ago with a compression fracture, and he believes that his back pain is from that.  He rates it as 9-10 out of 10.  He denies cough, change in bowel habits, abdominal pain.  He denies vomiting, shortness of breath, abdominal pain.  Patient's vital signs show tachycardia heart rate 115, blood pressure 155/84.  Patient has bilateral upper and lower tremors, and mid back pain increased with leg flexion.      CBC significant for anemia hemoglobin 9.6  compression fracture of T11 thoracic vertebra, sequela [M48.54XS] 10/29/2024    Alcohol abuse with withdrawal (HCC) [F10.139] 10/28/2024    Closed compression fracture of L1 lumbar vertebra, sequela [S32.010S] 10/17/2024    T12 compression fracture, sequela [S22.080S] 10/02/2024    Cubital tunnel syndrome of both upper extremities [G56.23] 11/28/2023    Back pain, chronic [M54.9, G89.29] 04/19/2012       Plan:        Patient status Admit as inpatient in the  Progressive Unit/Step down     Altered mental status, most probably secondary to alcohol withdrawal  History of alcoholism, cirrhosis, presented with tremors and tachycardia heart rate 115  Patient has a history of previous admission for altered mental status for alcohol withdrawal  7/11/1977  Patient was given Ativan and morphine  Decision to admit patient for altered mental status  CIWA protocol in place, patient given thiamine     Back pain/history of vertebral fractures  Patient had a history of multiple vertebral fractures and kyphoplasty  L1 kyphoplasty done a few weeks ago  Patient said that the pain is similar to the pain he had before doing his last surgery  CT lumbar shows compression deformity of L1  Will consult orthopedic surgery, patient given Norco 4 times a day    Electrolyte abnormality  Patient presented with potassium 3.3, magnesium 1.5, replaced  Potassium 3.5 magnesium 1.8 on 10/29, will replace  Follow BMP     Anemia  Baseline hemoglobin 9.2  Hemoglobin on presentation 9.6  Vitamin B12, folate normal in October 2024  Patient denies hemoptysis, melena  Follow CBC     Liver cirrhosis, s/p TIPS  Patient has history of alcoholism and hepatitis C  Patient has history of multiple GI bleeds and ascites  S/p TIPS in 2022, follows with GI  Lactulose 3 times as needed; patient has 2 bowel movements on average per day     A-fib with RVR  First captured in April 2024  Patient was reverted to sinus rhythm  On metoprolol 12.5 twice daily  ECG on

## 2024-10-31 NOTE — PROGRESS NOTES
Education  Patient Education  Education Given To: Patient  Education Provided: Role of Therapy;Plan of Care  Education Method: Demonstration;Verbal  Barriers to Learning: Cognition  Education Outcome: Continued education needed      Therapy Time   Individual Concurrent Group Co-treatment   Time In 0831         Time Out 0850         Minutes 19                 Emmie Steele, PT

## 2024-10-31 NOTE — PLAN OF CARE
Problem: Safety - Adult  Goal: Free from fall injury  10/30/2024 2006 by Camelia Camacho RN  Outcome: Progressing  10/30/2024 1556 by Adrienne Louie RN  Outcome: Progressing  10/30/2024 0646 by Lilo Hopkins RN  Outcome: Progressing     Problem: Discharge Planning  Goal: Discharge to home or other facility with appropriate resources  10/30/2024 2006 by Camelia Camacho RN  Outcome: Progressing  Flowsheets (Taken 10/30/2024 2000)  Discharge to home or other facility with appropriate resources:   Identify barriers to discharge with patient and caregiver   Arrange for needed discharge resources and transportation as appropriate   Identify discharge learning needs (meds, wound care, etc)  10/30/2024 1556 by Adrienne Louie RN  Outcome: Progressing  10/30/2024 0646 by Lilo Hopkins RN  Outcome: Progressing  Flowsheets (Taken 10/29/2024 2045)  Discharge to home or other facility with appropriate resources: Identify barriers to discharge with patient and caregiver     Problem: ABCDS Injury Assessment  Goal: Absence of physical injury  10/30/2024 2006 by Camelia Camacho RN  Outcome: Progressing  Flowsheets (Taken 10/30/2024 2000)  Absence of Physical Injury: Implement safety measures based on patient assessment  10/30/2024 1556 by Adrienne Louie RN  Outcome: Progressing  10/30/2024 0646 by Lilo Hopkins RN  Outcome: Progressing     Problem: Skin/Tissue Integrity  Goal: Absence of new skin breakdown  Description: 1.  Monitor for areas of redness and/or skin breakdown  2.  Assess vascular access sites hourly  3.  Every 4-6 hours minimum:  Change oxygen saturation probe site  4.  Every 4-6 hours:  If on nasal continuous positive airway pressure, respiratory therapy assess nares and determine need for appliance change or resting period.  10/30/2024 2006 by Camelia Camacho RN  Outcome: Progressing  10/30/2024 1556 by Adrienne Louie RN  Outcome: Progressing  10/30/2024 0646 by Lilo Hopkins RN  Outcome: Progressing

## 2024-10-31 NOTE — PLAN OF CARE
Problem: Safety - Adult  Goal: Free from fall injury  Outcome: Progressing     Problem: Discharge Planning  Goal: Discharge to home or other facility with appropriate resources  Outcome: Progressing     Problem: ABCDS Injury Assessment  Goal: Absence of physical injury  Outcome: Progressing     Problem: Skin/Tissue Integrity  Goal: Absence of new skin breakdown  Description: 1.  Monitor for areas of redness and/or skin breakdown  2.  Assess vascular access sites hourly  3.  Every 4-6 hours minimum:  Change oxygen saturation probe site  4.  Every 4-6 hours:  If on nasal continuous positive airway pressure, respiratory therapy assess nares and determine need for appliance change or resting period.  Outcome: Progressing     Problem: Pain  Goal: Verbalizes/displays adequate comfort level or baseline comfort level  Outcome: Progressing

## 2024-10-31 NOTE — PROGRESS NOTES
Patient repeatedly pulling off telemetry leads and is now refusing to be monitored. Education provided.

## 2024-10-31 NOTE — CARE COORDINATION
DISCHARGE PLANNING NOTE:    Received message from Kenyetta at [facility] stating patient can be accepted. Insurance auth will be started once therapy notes are in.    Electronically signed by Leonila Chapa RN on 10/31/2024 at 7:55 AM    Informed Kenyetta from [facility] that therapy notes are in so insurance auth can be started.    Electronically signed by Leonila Chapa RN on 10/31/2024 at 1:07 PM

## 2024-10-31 NOTE — PROGRESS NOTES
Physician Progress Note      PATIENT:               ALHAJI COOLEY  CSN #:                  [id number]  :                       1960  ADMIT DATE:       10/28/2024 11:29 AM  DISCH DATE:  RESPONDING  PROVIDER #:        Latisha Rivera MD          QUERY TEXT:    Pt admitted with alcohol withdraw.  Noted documentation of avascular necrosis   of left femur in 10/29 Ortho note and 10/29 IM note with date of 10/29/24.   Ortho note of 10/29 states \"historically, the patient has not been complaining   of his AVN of his left hip.\"  If possible, please document in progress notes   and discharge summary the clinical indicators to support this diagnosis on   current admission or clarify current status of Avascular Necrosis.    The medical record reflects the following:  Risk Factors: age 64, alcohol abuse, Vitamin D deficiency, arthritis, former   smoker  Clinical Indicators: documentation of avascular necrosis of left femur in   10/29 Ortho note and 10/29 IM note with date of 10/29/24. Ortho note of 10/29   states \"historically, the patient has not been complaining of his AVN of his   left hip.\"  Treatment: ortho consult, imaging, Lipitor, Norco  Options provided:  -- Avascular necrosis of left femur is currently being treated/evaluated as   evidenced by, Please document supportive evidence.  -- Avascular necrosis of left femur is PMH only  -- Other - I will add my own diagnosis  -- Disagree - Not applicable / Not valid  -- Disagree - Clinically unable to determine / Unknown  -- Refer to Clinical Documentation Reviewer    PROVIDER RESPONSE TEXT:    Avn new diagnosis therefore not pmh but not being treated other than expectant   observation    Query created by: Toya Frias on 10/31/2024 8:40 AM      Electronically signed by:  Latisha Rivera MD 10/31/2024 3:42 PM

## 2024-10-31 NOTE — PROGRESS NOTES
Surgical Progress Note    POD:      Patient doing fairly well  Vitals:    10/31/24 1256   BP: (!) 144/57   Pulse: 75   Resp: 18   Temp: 97.9 °F (36.6 °C)   SpO2: 100%      Temp (24hrs), Av.8 °F (36.6 °C), Min:97.2 °F (36.2 °C), Max:98.2 °F (36.8 °C)       Pain Control na  No unusual nausea    Exam: Patient still sleepy        Lungs:  No respiratory distress    Labs reviewed:  Labs:  WBC/Hgb/Hct/Plts:  6.8/9.1/26.3/129 (10/31 0542)  BUN/Cr/glu/ALT/AST/amyl/lip:  6/1.0/--/--/--/--/-- (10/31 0542)     Na/K/Cl/CO2:  140/3.5/106/25 (10/31 0542)    No intake/output data recorded.    Assessment:    Patient Active Problem List   Diagnosis    Back pain, chronic    Hearing difficulty    GERD (gastroesophageal reflux disease)    Cervical radicular pain    Hypomagnesemia    Chronic viral hepatitis B without delta agent and without coma (HCC)    Calculus of gallbladder without cholecystitis    Hep C w/o coma, chronic (HCC)    Fatty liver    Psychophysiologic insomnia    Cirrhosis (HCC)    Decompensation of cirrhosis of liver (HCC)    Vertebrogenic low back pain    DDD (degenerative disc disease), lumbar    Depression    Tubular adenoma of colon    History of colon polyps    Gynecomastia, male    Lumbar disc herniation    Tinnitus    Eustachian tube dysfunction    Ganglion cyst    Carpal tunnel syndrome of right wrist    History of hepatitis C    Vitamin D deficiency    Pure hypercholesterolemia    Hypokalemia    Essential hypertension    Recurrent major depressive disorder in partial remission (HCC)    S/P epidural steroid injection    Elevated LFTs    Seasonal allergies    Lumbar facet arthropathy    Cervical facet syndrome    Thrombocytopenia (HCC)    Hepatitis C virus infection resolved after antiviral drug therapy    Alcohol abuse    Altered mental status    Hypocalcemia    Hypophosphatemia    Malignant neoplasm of lower third of esophagus (HCC)    COVID-19    Anxiety    Current smoker    Severe comorbid illness

## 2024-11-01 VITALS
SYSTOLIC BLOOD PRESSURE: 129 MMHG | WEIGHT: 180.78 LBS | BODY MASS INDEX: 25.88 KG/M2 | DIASTOLIC BLOOD PRESSURE: 58 MMHG | HEART RATE: 72 BPM | HEIGHT: 70 IN | OXYGEN SATURATION: 99 % | RESPIRATION RATE: 18 BRPM | TEMPERATURE: 97.9 F

## 2024-11-01 LAB
ANION GAP SERPL CALCULATED.3IONS-SCNC: 9 MMOL/L (ref 9–16)
BASOPHILS # BLD: 0 K/UL (ref 0–0.2)
BASOPHILS NFR BLD: 0 % (ref 0–2)
BUN SERPL-MCNC: 5 MG/DL (ref 8–23)
CALCIUM SERPL-MCNC: 8.9 MG/DL (ref 8.6–10.4)
CHLORIDE SERPL-SCNC: 106 MMOL/L (ref 98–107)
CO2 SERPL-SCNC: 26 MMOL/L (ref 20–31)
CREAT SERPL-MCNC: 1 MG/DL (ref 0.7–1.2)
EOSINOPHIL # BLD: 0.35 K/UL (ref 0–0.4)
EOSINOPHILS RELATIVE PERCENT: 5 % (ref 0–4)
ERYTHROCYTE [DISTWIDTH] IN BLOOD BY AUTOMATED COUNT: 15.2 % (ref 11.5–14.9)
GFR, ESTIMATED: 84 ML/MIN/1.73M2
GLUCOSE SERPL-MCNC: 95 MG/DL (ref 74–99)
HCT VFR BLD AUTO: 28.3 % (ref 41–53)
HGB BLD-MCNC: 9.7 G/DL (ref 13.5–17.5)
LYMPHOCYTES NFR BLD: 0.98 K/UL (ref 1–4.8)
LYMPHOCYTES RELATIVE PERCENT: 14 % (ref 24–44)
MAGNESIUM SERPL-MCNC: 1.8 MG/DL (ref 1.6–2.4)
MCH RBC QN AUTO: 31.1 PG (ref 26–34)
MCHC RBC AUTO-ENTMCNC: 34.3 G/DL (ref 31–37)
MCV RBC AUTO: 90.7 FL (ref 80–100)
MONOCYTES NFR BLD: 1.12 K/UL (ref 0.1–1.3)
MONOCYTES NFR BLD: 16 % (ref 1–7)
MORPHOLOGY: ABNORMAL
NEUTROPHILS NFR BLD: 65 % (ref 36–66)
NEUTS SEG NFR BLD: 4.55 K/UL (ref 1.3–9.1)
PLATELET # BLD AUTO: 147 K/UL (ref 150–450)
PMV BLD AUTO: 7.3 FL (ref 6–12)
POTASSIUM SERPL-SCNC: 3.5 MMOL/L (ref 3.7–5.3)
RBC # BLD AUTO: 3.12 M/UL (ref 4.5–5.9)
SODIUM SERPL-SCNC: 141 MMOL/L (ref 136–145)
WBC OTHER # BLD: 7 K/UL (ref 3.5–11)

## 2024-11-01 PROCEDURE — 6360000002 HC RX W HCPCS: Performed by: INTERNAL MEDICINE

## 2024-11-01 PROCEDURE — 99239 HOSP IP/OBS DSCHRG MGMT >30: CPT | Performed by: INTERNAL MEDICINE

## 2024-11-01 PROCEDURE — 80048 BASIC METABOLIC PNL TOTAL CA: CPT

## 2024-11-01 PROCEDURE — 85025 COMPLETE CBC W/AUTO DIFF WBC: CPT

## 2024-11-01 PROCEDURE — 6370000000 HC RX 637 (ALT 250 FOR IP)

## 2024-11-01 PROCEDURE — 2580000003 HC RX 258

## 2024-11-01 PROCEDURE — 83735 ASSAY OF MAGNESIUM: CPT

## 2024-11-01 PROCEDURE — 36415 COLL VENOUS BLD VENIPUNCTURE: CPT

## 2024-11-01 RX ORDER — HYDROCODONE BITARTRATE AND ACETAMINOPHEN 5; 325 MG/1; MG/1
1 TABLET ORAL EVERY 8 HOURS PRN
Qty: 10 TABLET | Refills: 0 | Status: SHIPPED | OUTPATIENT
Start: 2024-11-01 | End: 2024-11-01

## 2024-11-01 RX ORDER — HYDROCODONE BITARTRATE AND ACETAMINOPHEN 5; 325 MG/1; MG/1
1 TABLET ORAL EVERY 8 HOURS PRN
Qty: 10 TABLET | Refills: 0 | Status: SHIPPED | OUTPATIENT
Start: 2024-11-01 | End: 2024-11-04

## 2024-11-01 RX ORDER — MIRTAZAPINE 15 MG/1
15 TABLET, ORALLY DISINTEGRATING ORAL NIGHTLY
Qty: 30 TABLET | Refills: 3 | Status: SHIPPED | OUTPATIENT
Start: 2024-11-01

## 2024-11-01 RX ADMIN — METOPROLOL TARTRATE 12.5 MG: 25 TABLET, FILM COATED ORAL at 08:51

## 2024-11-01 RX ADMIN — PANTOPRAZOLE SODIUM 40 MG: 40 TABLET, DELAYED RELEASE ORAL at 08:51

## 2024-11-01 RX ADMIN — FOLIC ACID 1 MG: 1 TABLET ORAL at 08:51

## 2024-11-01 RX ADMIN — ENOXAPARIN SODIUM 40 MG: 100 INJECTION SUBCUTANEOUS at 08:51

## 2024-11-01 RX ADMIN — THIAMINE HCL TAB 100 MG 100 MG: 100 TAB at 08:51

## 2024-11-01 RX ADMIN — SODIUM CHLORIDE, PRESERVATIVE FREE 10 ML: 5 INJECTION INTRAVENOUS at 08:54

## 2024-11-01 RX ADMIN — Medication 10 ML: at 08:56

## 2024-11-01 RX ADMIN — HYDROCODONE BITARTRATE AND ACETAMINOPHEN 1 TABLET: 5; 325 TABLET ORAL at 09:07

## 2024-11-01 RX ADMIN — LACTULOSE 30 G: 20 SOLUTION ORAL at 08:52

## 2024-11-01 RX ADMIN — LACTULOSE 30 G: 20 SOLUTION ORAL at 14:07

## 2024-11-01 RX ADMIN — FERROUS SULFATE TAB 325 MG (65 MG ELEMENTAL FE) 325 MG: 325 (65 FE) TAB at 08:51

## 2024-11-01 RX ADMIN — DULOXETINE HYDROCHLORIDE 30 MG: 30 CAPSULE, DELAYED RELEASE ORAL at 08:51

## 2024-11-01 ASSESSMENT — PAIN SCALES - GENERAL
PAINLEVEL_OUTOF10: 5
PAINLEVEL_OUTOF10: 10

## 2024-11-01 ASSESSMENT — PAIN DESCRIPTION - ORIENTATION
ORIENTATION: LOWER
ORIENTATION: LOWER

## 2024-11-01 ASSESSMENT — PAIN DESCRIPTION - DESCRIPTORS: DESCRIPTORS: DISCOMFORT

## 2024-11-01 ASSESSMENT — PAIN DESCRIPTION - LOCATION
LOCATION: BACK
LOCATION: BACK

## 2024-11-01 ASSESSMENT — PAIN - FUNCTIONAL ASSESSMENT: PAIN_FUNCTIONAL_ASSESSMENT: ACTIVITIES ARE NOT PREVENTED

## 2024-11-01 NOTE — PROGRESS NOTES
Surgical Progress Note    POD:       Patient doing fairly well  Vitals:    24 1715   BP: 125/63   Pulse: 79   Resp: 18   Temp: 98.6 °F (37 °C)   SpO2: 100%      Temp (24hrs), Av °F (36.7 °C), Min:97.5 °F (36.4 °C), Max:98.6 °F (37 °C)       Pain Control co back pain  No unusual nausea    Exam: mentation improved Alert oriented to self place            Lungs:  No respiratory distress    Labs reviewed:  Labs:  WBC/Hgb/Hct/Plts:  7.0/9.7/28.3/147 (543)  BUN/Cr/glu/ALT/AST/amyl/lip:  5/1.0/--/--/--/--/-- (543)     Na/K/Cl/CO2:  141/3.5/106/26 (543)    I/O last 3 completed shifts:  In: -   Out: 425 [Urine:425]    Assessment:    Patient Active Problem List   Diagnosis    Back pain, chronic    Hearing difficulty    GERD (gastroesophageal reflux disease)    Cervical radicular pain    Hypomagnesemia    Chronic viral hepatitis B without delta agent and without coma (HCC)    Calculus of gallbladder without cholecystitis    Hep C w/o coma, chronic (HCC)    Fatty liver    Psychophysiologic insomnia    Cirrhosis (HCC)    Decompensation of cirrhosis of liver (HCC)    Vertebrogenic low back pain    DDD (degenerative disc disease), lumbar    Depression    Tubular adenoma of colon    History of colon polyps    Gynecomastia, male    Lumbar disc herniation    Tinnitus    Eustachian tube dysfunction    Ganglion cyst    Carpal tunnel syndrome of right wrist    History of hepatitis C    Vitamin D deficiency    Pure hypercholesterolemia    Hypokalemia    Essential hypertension    Recurrent major depressive disorder in partial remission (HCC)    S/P epidural steroid injection    Elevated LFTs    Seasonal allergies    Lumbar facet arthropathy    Cervical facet syndrome    Thrombocytopenia (HCC)    Hepatitis C virus infection resolved after antiviral drug therapy    Alcohol abuse    Altered mental status    Hypocalcemia    Hypophosphatemia    Malignant neoplasm of lower third of esophagus (HCC)    COVID-19     Detail Level: Zone Detail Level: Generalized Detail Level: Detailed

## 2024-11-01 NOTE — CARE COORDINATION
Transportation arranged for patient to go to [facility] at [facility] via [facility]. Facility informed. Bedside nurse informed.     Number for Report: [phone]  Electronically signed by LYNDSEY Person on 11/1/2024 at 4:45 PM

## 2024-11-01 NOTE — PROGRESS NOTES
Physician Progress Note      PATIENT:               ALHAJI COOLEY  CSN #:                  [id number]  :                       1960  ADMIT DATE:       10/28/2024 11:29 AM  DISCH DATE:  RESPONDING  PROVIDER #:        Roberto Workman MD          QUERY TEXT:    Pt admitted with back pain, alcohol withdrawal. H & P states \"Altered mental   status, most probably secondary to alcohol withdrawal. Decision to admit   patient for altered mental status.\" Per flowsheet, disoriented to time, place,   and situation, poor judgment/safety awareness/attention/concentration.   Patient restless, flat affect.  If possible, please document in the progress   notes and discharge summary if you are evaluating and / or treating any of the   following:    The medical record reflects the following:  Risk Factors: alcoholic withdrawal, age 64, cirrhosis  Clinical Indicators: H & P states \"Altered mental status, most probably   secondary to alcohol withdrawal. Decision to admit patient for altered mental   status.\" Per flowsheet, disoriented to time, place, and situation, poor   judgment/safety awareness/attention/concentration. Patient restless, flat   affect.  Treatment: labs, CIWA scale, Ativan, IV fluids, thiamine, folic acid, fall   precautions, bed alarm, increased nursing monitoring, and reorientation.  Options provided:  -- Metabolic encephalopathy  -- Other - I will add my own diagnosis  -- Disagree - Not applicable / Not valid  -- Disagree - Clinically unable to determine / Unknown  -- Refer to Clinical Documentation Reviewer    PROVIDER RESPONSE TEXT:    This patient has metabolic encephalopathy.    Query created by: Toya Frias on 10/29/2024 2:34 PM      Electronically signed by:  Roberto Workman MD 2024 12:31 PM

## 2024-11-01 NOTE — CARE COORDINATION
IMM letter provided to patient representative Nilam.  Patient representative offered four hours to make informed decision regarding appeal process; patient representative agreeable to discharge.    Electronically signed by LYNDSEY Person on 11/1/2024 at 4:53 PM

## 2024-11-01 NOTE — TRANSITION OF CARE
Transition of Care Note    HPI: Patient is a 64-year-old male who presents for back pain.  Patient has significant past medical history of cirrhosis s/p TIPS, A-fib with RVR (converted to sinus rhythm and April 2024 (, history of cecal adenocarcinoma/PE s/p radiation therapy, history of GI bleeds/gastroesophageal varices, hepatitis C treated with Harvoni, history of bowel perforation requiring right hemicolectomy with end ileostomy reversed on October 2023, esophagitis grade D, T3, T11,L1 kyphoplasty, T12 compression fracture.     The patient presented this afternoon after not being able to get up from bed after waking up.  His last drink was around 11:30 PM the day before.  He said that he felt chest pain due to his heart beating a lot, he has been nauseated, both his hands and legs are tremulous.  He said that he had back pain similar to the back pain he had 1 month ago with a compression fracture, and he believes that his back pain is from that.  He rates it as 9-10 out of 10.  He denies cough, change in bowel habits, abdominal pain.  He denies vomiting, shortness of breath, abdominal pain.  Patient's vital signs show tachycardia heart rate 115, blood pressure 155/84.  Patient has bilateral upper and lower tremors, and mid back pain increased with leg flexion.      CBC significant for anemia hemoglobin 9.6 (baseline 9.2), platelets 141  CMP significant for hypokalemia 3.3, bilirubin of 1.7  Ethanol level 77  Troponin 27 repeat 23     ECG showed sinus tachycardia     CT abdomen, thoracic, lumbar ordered which showed: H intermediate interval compression deformity of the 11 compression deformity of L1, vertebroplasty at L2 and T12, small right-sided pleural effusion, small fluid containing right inguinal hernia, small fat-containing left inguinal hernia, stable 2.9 cm splenic artery aneurysm, small pericardial effusion.    Admitted for [facility].     Hospital course:  Gradually started requiring less Ativan. Pending

## 2024-11-01 NOTE — PROGRESS NOTES
Writer sent message to Mary () Let her know discharge order were in for patient and LACY had been completed.

## 2024-11-01 NOTE — PROGRESS NOTES
thalamus lacunar infarcts.  Stable mild chronic small vessel ischemic white matter disease.  Atherosclerotic calcification of the vasculature.  Stable mild atrophy. 3. No acute intracranial hemorrhage or midline shift.     XR CHEST PORTABLE    Result Date: 10/4/2024  EXAMINATION: ONE XRAY VIEW OF THE CHEST 10/4/2024 8:33 pm COMPARISON: 07/28/2024 HISTORY: ORDERING SYSTEM PROVIDED HISTORY: confusion TECHNOLOGIST PROVIDED HISTORY: confusion Reason for Exam: confusion FINDINGS: Lines and tubes: None Patchy right lower lobe airspace opacity relate to infectious etiology. Heart size stable.  No pneumothorax.     Right lower lobe airspace opacity concerning for pneumonia.     FLUORO FOR SURGICAL PROCEDURES    Result Date: 10/2/2024  Radiology exam is complete. No Radiologist dictation. Please follow up with ordering provider.         Physical Examination:        Physical Exam    Constitutional:       General: He is awake.   Eyes:      Pupils: Pupils are equal, round, and reactive to light.   Cardiovascular:      Rate and Rhythm: Regular rhythm.       Pulses: Normal pulses.      Heart sounds: Normal heart sounds.   Pulmonary:      Effort: Pulmonary effort is normal.      Breath sounds: Normal breath sounds.   Abdominal:      General: Abdomen is flat.      Palpations: Abdomen is soft.   Musculoskeletal:      Cervical back: Full passive range of motion without pain and normal range of motion.   Skin:     Coloration: Skin is pale. Skin is not jaundiced.   Neurological:      General: No focal deficit present.      Mental Status: He is oriented to person, place, and time.      Psychiatric:         Attention and Perception: He does not perceive auditory or visual hallucinations.         Behavior: Behavior is cooperative.      Assessment:        Primary Problem  Alcohol abuse with withdrawal (HCC)    Active Hospital Problems    Diagnosis Date Noted    Closed wedge compression fracture of T10 vertebra (HCC) [S22.070A] 10/29/2024

## 2024-11-01 NOTE — CARE COORDINATION
HENS COMPLETE  Document ID : [id number]  Electronically signed by LYNDSEY Person on 11/1/2024 at 4:44 PM

## 2024-11-01 NOTE — CARE COORDINATION
DISCHARGE PLANNING NOTE:    Writer is following for potential discharge to [facility]. Writer placed message to Kenyetta to check authorization status. Authorization is pending at this time. Writer attempted to meet with pt for update, sleeping at this time.  Electronically signed by LYNDSEY Person on 11/1/2024 at 11:53 AM    Writer received message from Kenyetta, authorization has been approved for this pt 11/1-11/4. Writer placed call to bedside RN Deanna to verify pt discharge, will reach out to physician for discharge order if appropriate.   Electronically signed by LYNDSEY Person on 11/1/2024 at 3:46 PM

## 2024-11-01 NOTE — PLAN OF CARE
Problem: Safety - Adult  Goal: Free from fall injury  11/1/2024 1653 by Deanna Nunn RN  Outcome: Adequate for Discharge     Problem: Discharge Planning  Goal: Discharge to home or other facility with appropriate resources  11/1/2024 1653 by Deanna Nunn RN  Outcome: Adequate for Discharge     Problem: ABCDS Injury Assessment  Goal: Absence of physical injury  11/1/2024 1653 by Deanna Nunn RN  Outcome: Adequate for Discharge     Problem: Skin/Tissue Integrity  Goal: Absence of new skin breakdown  Description: 1.  Monitor for areas of redness and/or skin breakdown  2.  Assess vascular access sites hourly  3.  Every 4-6 hours minimum:  Change oxygen saturation probe site  4.  Every 4-6 hours:  If on nasal continuous positive airway pressure, respiratory therapy assess nares and determine need for appliance change or resting period.  11/1/2024 1653 by Deanna Nunn RN  Outcome: Adequate for Discharge     Problem: Pain  Goal: Verbalizes/displays adequate comfort level or baseline comfort level  11/1/2024 1653 by Deanna Nunn RN  Outcome: Adequate for Discharge

## 2024-11-01 NOTE — DISCHARGE INSTR - COC
Continuity of Care Form    Patient Name: Frank Lisa   :  1960  MRN:  [id number]    Admit date:  10/28/2024  Discharge date:  ***    Code Status Order: Full Code   Advance Directives:   Advance Care Flowsheet Documentation             Admitting Physician:  Roberto Workman MD  PCP: Lopez Rosario PA    Discharging Nurse: Deanna  Discharging Hospital Unit/Room#: 2089/2089-01  Discharging Unit Phone Number: [phone]    Emergency Contact:   Extended Emergency Contact Information  Primary Emergency Contact: Nilam Lisa  Address: [address] [place] [address]  Home Phone: [phone]  Work Phone: [phone]  Mobile Phone: [phone]  Relation: Ex-Spouse    Past Surgical History:  Past Surgical History:   Procedure Laterality Date    APPENDECTOMY      BUNIONECTOMY      twice on right side    BUNIONECTOMY Left     CARPAL TUNNEL RELEASE Right     COLON SURGERY  10/10/2023    EXPLORATORY LAPAROTOMY, REVERSAL ILEOSTOMY WITH ILEOCOLOSTOMY, LYSIS OF ADHESIONS,    COLONOSCOPY      at age 39    COLONOSCOPY  10/05/2016    polyps-pathology tubular adenoma, and abnormal looking mucosa right colon-pathology-tubular adenoma    COLONOSCOPY N/A 2018    COLONOSCOPY POLYPECTOMY COLD BIOPSY performed by Augusto Cordova MD at [facility] OR    COLONOSCOPY  2018    Small polyp in the sigmoid colon and excised with biopsy forceps--tubular adenoma    COLONOSCOPY N/A 2022    COLONOSCOPY POLYPECTOMY performed by Augusto Cordova MD at [facility] OR    COLOSTOMY      later reversed    CYSTOSCOPY N/A 2024    CYSTOSCOPY performed by Edinson Griffith MD at [facility] OR    ENDOSCOPY, COLON, DIAGNOSTIC      EGD    ESOPHAGOGASTRODUODENOSCOPY  2022    ESOPHAGOGASTRODUODENOSCOPY N/A 2023    IR PORT PLACEMENT EQUAL OR GREATER THAN 5 YEARS  2021    IR PORT PLACEMENT EQUAL OR GREATER THAN 5 YEARS 2021 [facility] SPECIAL PROCEDURES    IR TIPS INSERTION  2022    IR TIPS INSERTION  REVERSAL ILEOSTOMY WITH ILEOCOLOSTOMY, LYSIS OF ADHESIONS, performed by Kvng Waddell DO at [facility] OR    SPINE SURGERY N/A 07/24/2024    KYPHOPLASTY T3 performed by Júnior Cueva MD at [facility] OR    SPINE SURGERY N/A 10/02/2024    KYPHOPLASTY T12 performed by Júnior Cueva MD at [facility] OR    SPINE SURGERY N/A 10/23/2024    KYPHOPLASTY L1 performed by Júnior Cueva MD at [facility] OR    THORACENTESIS      UPPER GASTROINTESTINAL ENDOSCOPY N/A 12/29/2020    EGD BIOPSY performed by Lucian Harley MD at [facility] ENDO    UPPER GASTROINTESTINAL ENDOSCOPY N/A 02/02/2021    EGD BIOPSY and spot marking performed by Augusto Cordova MD at [facility] ENDO    UPPER GASTROINTESTINAL ENDOSCOPY N/A 02/12/2021    ENDOSCOPIC ULTRASOUND, EGD performed by Yo Santos MD at [facility] Endoscopy    UPPER GASTROINTESTINAL ENDOSCOPY  02/12/2021    EGD DIAGNOSTIC ONLY performed by Yo Santos MD at [facility] Endoscopy    UPPER GASTROINTESTINAL ENDOSCOPY N/A 08/31/2021    EGD BIOPSY performed by Augusto oCrdova MD at [facility] ENDO    UPPER GASTROINTESTINAL ENDOSCOPY N/A 01/21/2022    EGD BIOPSY performed by Augusto Cordova MD at [facility] ENDO    UPPER GASTROINTESTINAL ENDOSCOPY N/A 04/15/2022    EGD ESOPHAGOGASTRODUODENOSCOPY performed by Lucian Harley MD at [facility] OR    UPPER GASTROINTESTINAL ENDOSCOPY N/A 06/06/2022    EGD BAND LIGATION performed by Bi Dawkins MD at [facility] ENDO    UPPER GASTROINTESTINAL ENDOSCOPY N/A 06/09/2022    EGD ESOPHAGOGASTRODUODENOSCOPY performed by iB Dawkins MD at [facility] ENDO    UPPER GASTROINTESTINAL ENDOSCOPY N/A 09/14/2022    EGD ESOPHAGOGASTRODUODENOSCOPY ENDOSCOPIC APC AT GE JUNCTION performed by Jose Mckenzie MD at [facility] ENDO    UPPER GASTROINTESTINAL ENDOSCOPY N/A 10/19/2022    EGD BIOPSY performed by Augusto Cordova MD at [facility] ENDO    UPPER GASTROINTESTINAL ENDOSCOPY N/A 10/31/2022    EGD with APC performed by Augusto Cordova MD at [facility] ENDO    UPPER GASTROINTESTINAL ENDOSCOPY  12/29/2022    EGD

## 2024-11-01 NOTE — PLAN OF CARE
Problem: Safety - Adult  Goal: Free from fall injury  11/1/2024 0405 by Arnaud Mena, RN  Outcome: Progressing     Problem: Discharge Planning  Goal: Discharge to home or other facility with appropriate resources  11/1/2024 0405 by Arnaud Mena, RN  Outcome: Progressing     Problem: ABCDS Injury Assessment  Goal: Absence of physical injury  11/1/2024 0405 by Arnaud Mena, RN  Outcome: Progressing     Problem: Pain  Goal: Verbalizes/displays adequate comfort level or baseline comfort level  11/1/2024 0405 by Arnaud Mena, RN  Outcome: Progressing

## 2024-11-01 NOTE — PLAN OF CARE
Problem: Safety - Adult  Goal: Free from fall injury  11/1/2024 1541 by Deanna Nunn RN  Outcome: Progressing     Problem: Discharge Planning  Goal: Discharge to home or other facility with appropriate resources  11/1/2024 1541 by Deanna Nunn RN  Outcome: Progressing     Problem: ABCDS Injury Assessment  Goal: Absence of physical injury  11/1/2024 1541 by Deanna Nunn RN  Outcome: Progressing     Problem: Skin/Tissue Integrity  Goal: Absence of new skin breakdown  Description: 1.  Monitor for areas of redness and/or skin breakdown  2.  Assess vascular access sites hourly  3.  Every 4-6 hours minimum:  Change oxygen saturation probe site  4.  Every 4-6 hours:  If on nasal continuous positive airway pressure, respiratory therapy assess nares and determine need for appliance change or resting period.  Outcome: Progressing     Problem: Pain  Goal: Verbalizes/displays adequate comfort level or baseline comfort level  11/1/2024 1541 by Deanna Nunn RN  Outcome: Progressing

## 2024-11-05 ENCOUNTER — OFFICE VISIT (OUTPATIENT)
Dept: ORTHOPEDIC SURGERY | Age: 64
End: 2024-11-05
Payer: COMMERCIAL

## 2024-11-05 ENCOUNTER — HOSPITAL ENCOUNTER (OUTPATIENT)
Age: 64
Setting detail: SPECIMEN
Discharge: HOME | End: 2024-11-05

## 2024-11-05 VITALS — WEIGHT: 180 LBS | RESPIRATION RATE: 14 BRPM | BODY MASS INDEX: 25.77 KG/M2 | HEIGHT: 70 IN

## 2024-11-05 DIAGNOSIS — M54.50 LOW BACK PAIN, UNSPECIFIED BACK PAIN LATERALITY, UNSPECIFIED CHRONICITY, UNSPECIFIED WHETHER SCIATICA PRESENT: Primary | ICD-10-CM

## 2024-11-05 DIAGNOSIS — S32.010A CLOSED COMPRESSION FRACTURE OF BODY OF L1 VERTEBRA (HCC): ICD-10-CM

## 2024-11-05 DIAGNOSIS — S22.070G CLOSED WEDGE COMPRESSION FRACTURE OF T10 VERTEBRA WITH DELAYED HEALING, SUBSEQUENT ENCOUNTER: ICD-10-CM

## 2024-11-05 LAB
ANION GAP SERPL CALCULATED.3IONS-SCNC: 9 MMOL/L (ref 9–16)
BUN SERPL-MCNC: 9 MG/DL (ref 8–23)
CALCIUM SERPL-MCNC: 8.7 MG/DL (ref 8.6–10.4)
CHLORIDE SERPL-SCNC: 107 MMOL/L (ref 98–107)
CO2 SERPL-SCNC: 25 MMOL/L (ref 20–31)
CREAT SERPL-MCNC: 1 MG/DL (ref 0.7–1.2)
ERYTHROCYTE [DISTWIDTH] IN BLOOD BY AUTOMATED COUNT: 15.9 % (ref 11.8–14.4)
GFR, ESTIMATED: 84 ML/MIN/1.73M2
GLUCOSE SERPL-MCNC: 88 MG/DL (ref 74–99)
HCT VFR BLD AUTO: 32.3 % (ref 40.7–50.3)
HGB BLD-MCNC: 10.1 G/DL (ref 13–17)
MAGNESIUM SERPL-MCNC: 1.4 MG/DL (ref 1.6–2.4)
MCH RBC QN AUTO: 30.2 PG (ref 25.2–33.5)
MCHC RBC AUTO-ENTMCNC: 31.3 G/DL (ref 28.4–34.8)
MCV RBC AUTO: 96.7 FL (ref 82.6–102.9)
NRBC BLD-RTO: 0 PER 100 WBC
PLATELET # BLD AUTO: 150 K/UL (ref 138–453)
PMV BLD AUTO: 10.3 FL (ref 8.1–13.5)
POTASSIUM SERPL-SCNC: 3.5 MMOL/L (ref 3.7–5.3)
RBC # BLD AUTO: 3.34 M/UL (ref 4.21–5.77)
SODIUM SERPL-SCNC: 141 MMOL/L (ref 136–145)
WBC OTHER # BLD: 7.6 K/UL (ref 3.5–11.3)

## 2024-11-05 PROCEDURE — 1123F ACP DISCUSS/DSCN MKR DOCD: CPT | Performed by: ORTHOPAEDIC SURGERY

## 2024-11-05 PROCEDURE — 83735 ASSAY OF MAGNESIUM: CPT

## 2024-11-05 PROCEDURE — 80048 BASIC METABOLIC PNL TOTAL CA: CPT

## 2024-11-05 PROCEDURE — P9603 ONE-WAY ALLOW PRORATED MILES: HCPCS

## 2024-11-05 PROCEDURE — 85027 COMPLETE CBC AUTOMATED: CPT

## 2024-11-05 PROCEDURE — 99213 OFFICE O/P EST LOW 20 MIN: CPT | Performed by: ORTHOPAEDIC SURGERY

## 2024-11-05 PROCEDURE — 36415 COLL VENOUS BLD VENIPUNCTURE: CPT

## 2024-11-05 NOTE — PROGRESS NOTES
Patient ID: Frank Lisa is a 64 y.o. male    Chief Compliant:  No chief complaint on file.       Diagnostic imaging:    AP lateral lumbar spine status post kyphoplasty's T11 and L1 chronic T12 compression fracture patient has also a new inferior endplate compression fracture T10  Assessment and Plan:  1. Low back pain, unspecified back pain laterality, unspecified chronicity, unspecified whether sciatica present    2. Closed compression fracture of body of L1 vertebra (HCC)    3. Closed wedge compression fracture of T10 vertebra with delayed healing, subsequent encounter      Patient hospitalized 1 week ago with 5 days approximately of severe delirium at this time the patient is not talking quite right with a little bit of slurring    Low back pain     S/p T3 kyphoplasty, 7/24/2024.     5 week post T12 kyphoplasty, 10/2/2024.    2 week post L1 kyphoplasty, 10/23/2024    T10 compression fracture    Advised to see PCP    Follow up 2 weeks    HPI:  This is a 64 y.o. male who presents to the clinic today for 2 week post L1 kyphoplasty, 10/23/2024.     S/p T3 kyphoplasty, 7/24/2024.  5 week post T12 kyphoplasty, 10/2/2024.    Patient was scheduled for C5-6, C6-7 ACDF on 10/21/2024, but was cancelled.    Patient continues to have debilitating back pain    Patient was in the hospital for about 1 week and was delirious. He continues to have a hard time speaking.     Patient reports his pain in the bilateral hands has improved    Patient ambulates with a roll aid walker    Review of Systems   All other systems reviewed and are negative.      Past History:    Current Outpatient Medications:     mirtazapine (REMERON SOL-TAB) 15 MG disintegrating tablet, Take 1 tablet by mouth nightly, Disp: 30 tablet, Rfl: 3    lactulose (CHRONULAC) 10 GM/15ML solution, Take 45 mLs by mouth 3 times daily, Disp: 946 mL, Rfl: 1    atorvastatin (LIPITOR) 40 MG tablet, Take 1 tablet by mouth nightly, Disp: 30 tablet, Rfl: 3    vitamin D

## 2024-11-08 NOTE — DISCHARGE SUMMARY
IN-PATIENT SERVICE   [facility]    Discharge Summary     Patient ID: Frank Lisa  :  1960   MRN: [id number]     ACCOUNT:  [id number]   Patient's PCP: Lopez Rosario PA  Admit Date: 10/28/2024   Discharge Date: 2024    Length of Stay: 4  Code Status:  Prior  Admitting Physician: Roberto Workman MD  Discharge Physician: Kathi Russo MD     Active Discharge Diagnoses:     Primary Problem  Alcohol abuse with withdrawal (HCC)      Hospital Problems  Active Hospital Problems    Diagnosis Date Noted    Closed wedge compression fracture of T10 vertebra (HCC) [S22.070A] 10/29/2024    Avascular necrosis of left femur [M87.052] 10/29/2024    Spinal stenosis in cervical region [M48.02] 10/29/2024    Non-traumatic compression fracture of T11 thoracic vertebra, sequela [M48.54XS] 10/29/2024    Alcohol abuse with withdrawal (HCC) [F10.139] 10/28/2024    Closed compression fracture of L1 lumbar vertebra, sequela [S32.010S] 10/17/2024    T12 compression fracture, sequela [S22.080S] 10/02/2024    Cubital tunnel syndrome of both upper extremities [G56.23] 2023    Back pain, chronic [M54.9, G89.29] 2012       Admission Condition:  fair     Discharged Condition: fair    Hospital Stay:     Hospital Course:  Frank Lisa is a 64 y.o. male who was admitted for the management of Alcohol abuse with withdrawal (HCC) , presented to ER with Back Pain  Patient is a 64-year-old male who presents for back pain.  Patient has significant past medical history of cirrhosis s/p TIPS, A-fib with RVR (converted to sinus rhythm and 2024 (, history of cecal adenocarcinoma/PE s/p radiation therapy, history of GI bleeds/gastroesophageal varices, hepatitis C treated with Harvoni, history of bowel perforation requiring right hemicolectomy with end ileostomy reversed on 2023, esophagitis grade D, T3, T11,L1 kyphoplasty, T12 compression fracture.     The patient presented this

## 2024-11-18 ENCOUNTER — TELEPHONE (OUTPATIENT)
Dept: ORTHOPEDIC SURGERY | Age: 64
End: 2024-11-18

## 2024-11-18 NOTE — TELEPHONE ENCOUNTER
Patient called in stating he is in a lot of pain since surgery (KYPHOPLASTY 10/23/2024) and wants to see if he can get something called in for pain  Has taken percocet in the past and its been effective  Please advise  Thank you

## 2024-11-19 ENCOUNTER — OFFICE VISIT (OUTPATIENT)
Dept: ORTHOPEDIC SURGERY | Age: 64
End: 2024-11-19
Payer: COMMERCIAL

## 2024-11-19 ENCOUNTER — PREP FOR PROCEDURE (OUTPATIENT)
Dept: ORTHOPEDIC SURGERY | Age: 64
End: 2024-11-19

## 2024-11-19 VITALS — RESPIRATION RATE: 14 BRPM | WEIGHT: 180 LBS | BODY MASS INDEX: 25.77 KG/M2 | HEIGHT: 70 IN

## 2024-11-19 DIAGNOSIS — S22.070G CLOSED WEDGE COMPRESSION FRACTURE OF T10 VERTEBRA WITH DELAYED HEALING, SUBSEQUENT ENCOUNTER: Primary | ICD-10-CM

## 2024-11-19 DIAGNOSIS — S32.040A CLOSED COMPRESSION FRACTURE OF L4 LUMBAR VERTEBRA, INITIAL ENCOUNTER (HCC): ICD-10-CM

## 2024-11-19 DIAGNOSIS — S22.070G CLOSED WEDGE COMPRESSION FRACTURE OF T10 VERTEBRA WITH DELAYED HEALING, SUBSEQUENT ENCOUNTER: ICD-10-CM

## 2024-11-19 DIAGNOSIS — S32.010A CLOSED COMPRESSION FRACTURE OF BODY OF L1 VERTEBRA (HCC): Primary | ICD-10-CM

## 2024-11-19 PROBLEM — S22.070A COMPRESSION FRACTURE OF T10 VERTEBRA (HCC): Status: ACTIVE | Noted: 2024-11-19

## 2024-11-19 PROCEDURE — 1123F ACP DISCUSS/DSCN MKR DOCD: CPT | Performed by: ORTHOPAEDIC SURGERY

## 2024-11-19 PROCEDURE — 99213 OFFICE O/P EST LOW 20 MIN: CPT | Performed by: ORTHOPAEDIC SURGERY

## 2024-11-19 RX ORDER — HYDROCODONE BITARTRATE AND ACETAMINOPHEN 5; 325 MG/1; MG/1
1 TABLET ORAL EVERY 4 HOURS PRN
Qty: 18 TABLET | Refills: 0 | Status: ON HOLD | OUTPATIENT
Start: 2024-11-19 | End: 2024-11-22 | Stop reason: HOSPADM

## 2024-11-19 NOTE — PROGRESS NOTES
reviewed.   Constitutional:       Appearance: well-developed.   HENT:      Head: Normocephalic and atraumatic.      Nose: Nose normal.   Eyes:      Conjunctiva/sclera: Conjunctivae normal.   Neck:      Musculoskeletal: Normal range of motion and neck supple.   Pulmonary:      Effort: Pulmonary effort is normal. No respiratory distress.   Musculoskeletal:      Comments: Normal gait     Skin:     General: Skin is warm and dry.   Neurological:      Mental Status: Alert and oriented to person, place, and time.      Sensory: No sensory deficit.   Psychiatric:         Behavior: Behavior normal.         Thought Content: Thought content normal.        Provider Attestation:  I, Júnior Cueva, personally performed the services described in this documentation. All medical record entries made by the scribe were at my direction and in my presence. I have reviewed the chart and discharge instructions and agree that the records reflect my personal performance and is accurate and complete. Júnior Cueva MD 11/19/24       Scribe Attestation:  By signing my name below, I, Michelle Chopra, attest that this documentation has been prepared under the direction and in the presence of Dr. Júnior Cueva. Electronically signed: Jazmine Lopez, 11/19/24     Please note that this chart was generated using voice recognition Dragon dictation software.  Although every effort was made to ensure the accuracy of this automated transcription, some errors in transcription may have occurred.

## 2024-11-19 NOTE — PLAN OF CARE
Problem: Discharge Planning  Goal: Discharge to home or other facility with appropriate resources  1/5/2023 1305 by Serafin Roque RN  Outcome: Progressing  1/4/2023 2352 by Ana Paula Olguin RN  Outcome: Progressing     Problem: Pain  Goal: Verbalizes/displays adequate comfort level or baseline comfort level  1/5/2023 1305 by Serafin Roque RN  Outcome: Progressing  1/4/2023 2352 by Ana Paula Olguin RN  Outcome: Progressing     Problem: Cardiovascular - Adult  Goal: Maintains optimal cardiac output and hemodynamic stability  1/5/2023 1305 by Serafin Roque RN  Outcome: Progressing  1/4/2023 2352 by Ana Paula Olguin RN  Outcome: Progressing Detail Level: Detailed Patient Specific Otc Recommendations (Will Not Stick From Patient To Patient): Pt advised to use OTC Sarna sensitive or itchX on back for itching

## 2024-11-21 ENCOUNTER — HOSPITAL ENCOUNTER (OUTPATIENT)
Dept: PREADMISSION TESTING | Age: 64
Setting detail: OUTPATIENT SURGERY
End: 2024-11-21
Payer: COMMERCIAL

## 2024-11-21 ENCOUNTER — ANESTHESIA EVENT (OUTPATIENT)
Dept: OPERATING ROOM | Age: 64
End: 2024-11-21
Payer: COMMERCIAL

## 2024-11-21 VITALS — HEIGHT: 70 IN | WEIGHT: 175 LBS | BODY MASS INDEX: 25.05 KG/M2

## 2024-11-21 DIAGNOSIS — S32.000G COMPRESSION FRACTURE OF LUMBAR VERTEBRA WITH DELAYED HEALING, UNSPECIFIED LUMBAR VERTEBRAL LEVEL, SUBSEQUENT ENCOUNTER: Primary | ICD-10-CM

## 2024-11-21 RX ORDER — HYDROCODONE BITARTRATE AND ACETAMINOPHEN 5; 325 MG/1; MG/1
1 TABLET ORAL EVERY 6 HOURS PRN
Qty: 28 TABLET | Refills: 0 | Status: SHIPPED | OUTPATIENT
Start: 2024-11-21 | End: 2024-11-21

## 2024-11-21 NOTE — PROGRESS NOTES
Pre-op Instructions For Out-Patient Surgery    Medication Instructions:  Please stop herbs and any supplements now (includes vitamins and minerals).    Please contact your surgeon and prescribing physician for pre-op instructions for any blood thinners.    If you have inhalers/aerosol treatments at home, please use them the morning of your surgery and bring the inhalers with you to the hospital.    Please take the following medications the morning of your surgery with a sip of water:    metoprolol, pantoprazole    Surgery Instructions:  After midnight before surgery:  Do not eat or drink anything, including water, mints, gum, and hard candy.  You may brush your teeth without swallowing.  No smoking, chewing tobacco, or street drugs.    Please shower or bathe before surgery.  If you were given Surgical Scrub Chlorhexidine Gluconate Liquid (CHG), please shower the night before and the morning of your surgery following the detailed instructions you received during your pre-admission visit.     Please do not wear any cologne, lotion, powder, deodorant, jewelry, piercings, perfume, makeup, nail polish, hair accessories, or hair spray on the day of surgery.  Wear loose comfortable clothing.    Leave your valuables at home but bring a payment source for any after-surgery prescriptions you plan to fill at [facility].  Bring a storage case for any glasses/contacts.    An adult who is responsible for you MUST drive you home and should be with you for the first 24 hours after surgery.     If having out-patient knee and foot surgeries, please arrange for planned crutches, walker, or wheelchair before arriving to the hospital.    The Day of Surgery:  Arrive at [facility] Entrance at the time directed by your surgeon and check in at the desk.     If you have a living will or healthcare power of , please bring a copy.    You will be taken to the pre-op holding

## 2024-11-22 ENCOUNTER — HOSPITAL ENCOUNTER (OUTPATIENT)
Age: 64
Setting detail: OUTPATIENT SURGERY
Discharge: HOME | End: 2024-11-22
Attending: ORTHOPAEDIC SURGERY | Admitting: ORTHOPAEDIC SURGERY
Payer: COMMERCIAL

## 2024-11-22 ENCOUNTER — ANESTHESIA (OUTPATIENT)
Dept: OPERATING ROOM | Age: 64
End: 2024-11-22
Payer: COMMERCIAL

## 2024-11-22 ENCOUNTER — APPOINTMENT (OUTPATIENT)
Dept: GENERAL RADIOLOGY | Age: 64
End: 2024-11-22
Attending: ORTHOPAEDIC SURGERY
Payer: COMMERCIAL

## 2024-11-22 VITALS
BODY MASS INDEX: 25.05 KG/M2 | SYSTOLIC BLOOD PRESSURE: 152 MMHG | OXYGEN SATURATION: 97 % | HEART RATE: 77 BPM | RESPIRATION RATE: 16 BRPM | DIASTOLIC BLOOD PRESSURE: 78 MMHG | TEMPERATURE: 97 F | WEIGHT: 175 LBS | HEIGHT: 70 IN

## 2024-11-22 DIAGNOSIS — S32.040A CLOSED COMPRESSION FRACTURE OF L4 LUMBAR VERTEBRA, INITIAL ENCOUNTER (HCC): ICD-10-CM

## 2024-11-22 DIAGNOSIS — M48.54XS NON-TRAUMATIC COMPRESSION FRACTURE OF T11 THORACIC VERTEBRA, SEQUELA: Primary | ICD-10-CM

## 2024-11-22 PROCEDURE — 7100000030 HC ASPR PHASE II RECOVERY - FIRST 15 MIN: Performed by: ORTHOPAEDIC SURGERY

## 2024-11-22 PROCEDURE — 7100000011 HC PHASE II RECOVERY - ADDTL 15 MIN: Performed by: ORTHOPAEDIC SURGERY

## 2024-11-22 PROCEDURE — 7100000001 HC PACU RECOVERY - ADDTL 15 MIN: Performed by: ORTHOPAEDIC SURGERY

## 2024-11-22 PROCEDURE — 6360000002 HC RX W HCPCS

## 2024-11-22 PROCEDURE — 6360000002 HC RX W HCPCS: Performed by: ANESTHESIOLOGY

## 2024-11-22 PROCEDURE — 2500000003 HC RX 250 WO HCPCS: Performed by: ORTHOPAEDIC SURGERY

## 2024-11-22 PROCEDURE — 3600000012 HC SURGERY LEVEL 2 ADDTL 15MIN: Performed by: ORTHOPAEDIC SURGERY

## 2024-11-22 PROCEDURE — 2709999900 HC NON-CHARGEABLE SUPPLY: Performed by: ORTHOPAEDIC SURGERY

## 2024-11-22 PROCEDURE — 6370000000 HC RX 637 (ALT 250 FOR IP): Performed by: ANESTHESIOLOGY

## 2024-11-22 PROCEDURE — 3700000000 HC ANESTHESIA ATTENDED CARE: Performed by: ORTHOPAEDIC SURGERY

## 2024-11-22 PROCEDURE — 6360000002 HC RX W HCPCS: Performed by: ORTHOPAEDIC SURGERY

## 2024-11-22 PROCEDURE — 2580000003 HC RX 258: Performed by: ANESTHESIOLOGY

## 2024-11-22 PROCEDURE — 7100000031 HC ASPR PHASE II RECOVERY - ADDTL 15 MIN: Performed by: ORTHOPAEDIC SURGERY

## 2024-11-22 PROCEDURE — 2720000010 HC SURG SUPPLY STERILE: Performed by: ORTHOPAEDIC SURGERY

## 2024-11-22 PROCEDURE — 7100000000 HC PACU RECOVERY - FIRST 15 MIN: Performed by: ORTHOPAEDIC SURGERY

## 2024-11-22 PROCEDURE — 2500000003 HC RX 250 WO HCPCS: Performed by: ANESTHESIOLOGY

## 2024-11-22 PROCEDURE — 3700000001 HC ADD 15 MINUTES (ANESTHESIA): Performed by: ORTHOPAEDIC SURGERY

## 2024-11-22 PROCEDURE — 3600000002 HC SURGERY LEVEL 2 BASE: Performed by: ORTHOPAEDIC SURGERY

## 2024-11-22 PROCEDURE — 2500000003 HC RX 250 WO HCPCS

## 2024-11-22 PROCEDURE — C1894 INTRO/SHEATH, NON-LASER: HCPCS | Performed by: ORTHOPAEDIC SURGERY

## 2024-11-22 PROCEDURE — 6360000004 HC RX CONTRAST MEDICATION: Performed by: ORTHOPAEDIC SURGERY

## 2024-11-22 PROCEDURE — C1713 ANCHOR/SCREW BN/BN,TIS/BN: HCPCS | Performed by: ORTHOPAEDIC SURGERY

## 2024-11-22 PROCEDURE — 7100000010 HC PHASE II RECOVERY - FIRST 15 MIN: Performed by: ORTHOPAEDIC SURGERY

## 2024-11-22 DEVICE — CEMENT C01A KYPHX HV-R BONE CEMENT EN
Type: IMPLANTABLE DEVICE | Site: BACK | Status: FUNCTIONAL
Brand: KYPHON® HV-R® BONE CEMENT

## 2024-11-22 RX ORDER — FENTANYL CITRATE 50 UG/ML
INJECTION, SOLUTION INTRAMUSCULAR; INTRAVENOUS
Status: DISCONTINUED | OUTPATIENT
Start: 2024-11-22 | End: 2024-11-22 | Stop reason: SDUPTHER

## 2024-11-22 RX ORDER — LIDOCAINE HYDROCHLORIDE 10 MG/ML
INJECTION, SOLUTION EPIDURAL; INFILTRATION; INTRACAUDAL; PERINEURAL
Status: DISCONTINUED | OUTPATIENT
Start: 2024-11-22 | End: 2024-11-22 | Stop reason: SDUPTHER

## 2024-11-22 RX ORDER — SODIUM CHLORIDE 0.9 % (FLUSH) 0.9 %
5-40 SYRINGE (ML) INJECTION PRN
Status: DISCONTINUED | OUTPATIENT
Start: 2024-11-22 | End: 2024-11-22 | Stop reason: HOSPADM

## 2024-11-22 RX ORDER — ESMOLOL HYDROCHLORIDE 10 MG/ML
INJECTION INTRAVENOUS
Status: DISCONTINUED | OUTPATIENT
Start: 2024-11-22 | End: 2024-11-22 | Stop reason: SDUPTHER

## 2024-11-22 RX ORDER — SODIUM CHLORIDE 9 MG/ML
INJECTION, SOLUTION INTRAVENOUS PRN
Status: DISCONTINUED | OUTPATIENT
Start: 2024-11-22 | End: 2024-11-22 | Stop reason: HOSPADM

## 2024-11-22 RX ORDER — NALOXONE HYDROCHLORIDE 0.4 MG/ML
INJECTION, SOLUTION INTRAMUSCULAR; INTRAVENOUS; SUBCUTANEOUS PRN
Status: DISCONTINUED | OUTPATIENT
Start: 2024-11-22 | End: 2024-11-22 | Stop reason: HOSPADM

## 2024-11-22 RX ORDER — SODIUM CHLORIDE 0.9 % (FLUSH) 0.9 %
5-40 SYRINGE (ML) INJECTION EVERY 12 HOURS SCHEDULED
Status: DISCONTINUED | OUTPATIENT
Start: 2024-11-22 | End: 2024-11-22 | Stop reason: HOSPADM

## 2024-11-22 RX ORDER — SODIUM CHLORIDE 9 MG/ML
INJECTION, SOLUTION INTRAVENOUS CONTINUOUS
Status: DISCONTINUED | OUTPATIENT
Start: 2024-11-22 | End: 2024-11-22 | Stop reason: HOSPADM

## 2024-11-22 RX ORDER — GABAPENTIN 100 MG/1
100 CAPSULE ORAL ONCE
Status: COMPLETED | OUTPATIENT
Start: 2024-11-22 | End: 2024-11-22

## 2024-11-22 RX ORDER — PROPOFOL 10 MG/ML
INJECTION, EMULSION INTRAVENOUS
Status: DISCONTINUED | OUTPATIENT
Start: 2024-11-22 | End: 2024-11-22 | Stop reason: SDUPTHER

## 2024-11-22 RX ORDER — DEXAMETHASONE SODIUM PHOSPHATE 4 MG/ML
INJECTION, SOLUTION INTRA-ARTICULAR; INTRALESIONAL; INTRAMUSCULAR; INTRAVENOUS; SOFT TISSUE
Status: DISCONTINUED | OUTPATIENT
Start: 2024-11-22 | End: 2024-11-22 | Stop reason: SDUPTHER

## 2024-11-22 RX ORDER — IOPAMIDOL 408 MG/ML
INJECTION, SOLUTION INTRATHECAL PRN
Status: DISCONTINUED | OUTPATIENT
Start: 2024-11-22 | End: 2024-11-22 | Stop reason: ALTCHOICE

## 2024-11-22 RX ORDER — ONDANSETRON 2 MG/ML
INJECTION INTRAMUSCULAR; INTRAVENOUS
Status: DISCONTINUED | OUTPATIENT
Start: 2024-11-22 | End: 2024-11-22 | Stop reason: SDUPTHER

## 2024-11-22 RX ORDER — ROCURONIUM BROMIDE 10 MG/ML
INJECTION, SOLUTION INTRAVENOUS
Status: DISCONTINUED | OUTPATIENT
Start: 2024-11-22 | End: 2024-11-22 | Stop reason: SDUPTHER

## 2024-11-22 RX ORDER — LIDOCAINE HYDROCHLORIDE 10 MG/ML
1 INJECTION, SOLUTION EPIDURAL; INFILTRATION; INTRACAUDAL; PERINEURAL
Status: COMPLETED | OUTPATIENT
Start: 2024-11-22 | End: 2024-11-22

## 2024-11-22 RX ORDER — HYDROCODONE BITARTRATE AND ACETAMINOPHEN 5; 325 MG/1; MG/1
1 TABLET ORAL EVERY 6 HOURS PRN
Qty: 28 TABLET | Refills: 0 | Status: SHIPPED | OUTPATIENT
Start: 2024-11-22 | End: 2024-11-29

## 2024-11-22 RX ORDER — ONDANSETRON 2 MG/ML
4 INJECTION INTRAMUSCULAR; INTRAVENOUS
Status: COMPLETED | OUTPATIENT
Start: 2024-11-22 | End: 2024-11-22

## 2024-11-22 RX ORDER — FENTANYL CITRATE 0.05 MG/ML
50 INJECTION, SOLUTION INTRAMUSCULAR; INTRAVENOUS EVERY 5 MIN PRN
Status: DISCONTINUED | OUTPATIENT
Start: 2024-11-22 | End: 2024-11-22 | Stop reason: HOSPADM

## 2024-11-22 RX ORDER — CEFAZOLIN SODIUM/WATER 2 G/20 ML
2000 SYRINGE (ML) INTRAVENOUS ONCE
Status: COMPLETED | OUTPATIENT
Start: 2024-11-22 | End: 2024-11-22

## 2024-11-22 RX ORDER — MIDAZOLAM HYDROCHLORIDE 1 MG/ML
INJECTION, SOLUTION INTRAMUSCULAR; INTRAVENOUS
Status: DISCONTINUED | OUTPATIENT
Start: 2024-11-22 | End: 2024-11-22 | Stop reason: SDUPTHER

## 2024-11-22 RX ORDER — BUPIVACAINE HYDROCHLORIDE AND EPINEPHRINE 2.5; 5 MG/ML; UG/ML
INJECTION, SOLUTION EPIDURAL; INFILTRATION; INTRACAUDAL; PERINEURAL PRN
Status: DISCONTINUED | OUTPATIENT
Start: 2024-11-22 | End: 2024-11-22 | Stop reason: ALTCHOICE

## 2024-11-22 RX ORDER — DIPHENHYDRAMINE HYDROCHLORIDE 50 MG/ML
12.5 INJECTION INTRAMUSCULAR; INTRAVENOUS
Status: COMPLETED | OUTPATIENT
Start: 2024-11-22 | End: 2024-11-22

## 2024-11-22 RX ADMIN — ONDANSETRON 4 MG: 2 INJECTION, SOLUTION INTRAMUSCULAR; INTRAVENOUS at 17:24

## 2024-11-22 RX ADMIN — DIPHENHYDRAMINE HYDROCHLORIDE 12.5 MG: 50 INJECTION INTRAMUSCULAR; INTRAVENOUS at 17:27

## 2024-11-22 RX ADMIN — MIDAZOLAM 2 MG: 1 INJECTION INTRAMUSCULAR; INTRAVENOUS at 15:52

## 2024-11-22 RX ADMIN — ONDANSETRON 4 MG: 2 INJECTION, SOLUTION INTRAMUSCULAR; INTRAVENOUS at 16:07

## 2024-11-22 RX ADMIN — SUGAMMADEX 200 MG: 100 INJECTION, SOLUTION INTRAVENOUS at 16:49

## 2024-11-22 RX ADMIN — FENTANYL CITRATE 25 MCG: 50 INJECTION INTRAMUSCULAR; INTRAVENOUS at 16:33

## 2024-11-22 RX ADMIN — FENTANYL CITRATE 50 MCG: 50 INJECTION INTRAMUSCULAR; INTRAVENOUS at 15:54

## 2024-11-22 RX ADMIN — DEXAMETHASONE SODIUM PHOSPHATE 8 MG: 4 INJECTION INTRA-ARTICULAR; INTRALESIONAL; INTRAMUSCULAR; INTRAVENOUS; SOFT TISSUE at 16:07

## 2024-11-22 RX ADMIN — SODIUM CHLORIDE, PRESERVATIVE FREE 10 ML: 5 INJECTION INTRAVENOUS at 14:29

## 2024-11-22 RX ADMIN — HYDROMORPHONE HYDROCHLORIDE 0.5 MG: 1 INJECTION, SOLUTION INTRAMUSCULAR; INTRAVENOUS; SUBCUTANEOUS at 17:21

## 2024-11-22 RX ADMIN — GABAPENTIN 100 MG: 100 CAPSULE ORAL at 14:29

## 2024-11-22 RX ADMIN — LIDOCAINE HYDROCHLORIDE 50 MG: 10 INJECTION, SOLUTION EPIDURAL; INFILTRATION; INTRACAUDAL; PERINEURAL at 15:54

## 2024-11-22 RX ADMIN — LIDOCAINE HYDROCHLORIDE 1 ML: 10 INJECTION, SOLUTION EPIDURAL; INFILTRATION; INTRACAUDAL; PERINEURAL at 14:28

## 2024-11-22 RX ADMIN — FENTANYL CITRATE 50 MCG: 50 INJECTION INTRAMUSCULAR; INTRAVENOUS at 16:44

## 2024-11-22 RX ADMIN — FENTANYL CITRATE 50 MCG: 0.05 INJECTION, SOLUTION INTRAMUSCULAR; INTRAVENOUS at 17:59

## 2024-11-22 RX ADMIN — PROPOFOL 160 MG: 10 INJECTION, EMULSION INTRAVENOUS at 15:54

## 2024-11-22 RX ADMIN — ESMOLOL HYDROCHLORIDE 30 MG: 10 INJECTION INTRAVENOUS at 16:21

## 2024-11-22 RX ADMIN — FENTANYL CITRATE 25 MCG: 50 INJECTION INTRAMUSCULAR; INTRAVENOUS at 16:30

## 2024-11-22 RX ADMIN — ROCURONIUM BROMIDE 50 MG: 10 INJECTION, SOLUTION INTRAVENOUS at 15:55

## 2024-11-22 RX ADMIN — FENTANYL CITRATE 25 MCG: 50 INJECTION INTRAMUSCULAR; INTRAVENOUS at 16:52

## 2024-11-22 RX ADMIN — Medication 2000 MG: at 16:03

## 2024-11-22 RX ADMIN — FENTANYL CITRATE 25 MCG: 50 INJECTION INTRAMUSCULAR; INTRAVENOUS at 16:20

## 2024-11-22 RX ADMIN — SODIUM CHLORIDE: 9 INJECTION, SOLUTION INTRAVENOUS at 15:50

## 2024-11-22 ASSESSMENT — ENCOUNTER SYMPTOMS
SHORTNESS OF BREATH: 1
COUGH: 1
ABDOMINAL PAIN: 1
SHORTNESS OF BREATH: 0
SORE THROAT: 0
APNEA: 0
TROUBLE SWALLOWING: 0

## 2024-11-22 ASSESSMENT — PAIN DESCRIPTION - DESCRIPTORS
DESCRIPTORS: ACHING
DESCRIPTORS: SHARP
DESCRIPTORS: ACHING;BURNING
DESCRIPTORS: SHARP

## 2024-11-22 ASSESSMENT — PAIN DESCRIPTION - LOCATION
LOCATION: INCISION;BACK
LOCATION: INCISION;BACK

## 2024-11-22 ASSESSMENT — PAIN - FUNCTIONAL ASSESSMENT
PAIN_FUNCTIONAL_ASSESSMENT: 0-10
PAIN_FUNCTIONAL_ASSESSMENT: NONE - DENIES PAIN
PAIN_FUNCTIONAL_ASSESSMENT: 0-10

## 2024-11-22 ASSESSMENT — PAIN DESCRIPTION - ORIENTATION
ORIENTATION: LOWER;MID
ORIENTATION: LOWER;MID

## 2024-11-22 ASSESSMENT — PAIN DESCRIPTION - PAIN TYPE
TYPE: SURGICAL PAIN
TYPE: SURGICAL PAIN

## 2024-11-22 ASSESSMENT — PAIN SCALES - GENERAL
PAINLEVEL_OUTOF10: 10
PAINLEVEL_OUTOF10: 10

## 2024-11-22 NOTE — ANESTHESIA PRE PROCEDURE
Department of Anesthesiology  Preprocedure Note       Name:  Frank Lisa   Age:  64 y.o.  :  1960                                          MRN:  [id number]         Date:  2024      Surgeon: Surgeon(s):  Júnior Cueva MD    Procedure: Procedure(s):  KYPHOPLASTY L4 & T10    Medications prior to admission:   Prior to Admission medications    Medication Sig Start Date End Date Taking? Authorizing Provider   HYDROcodone-acetaminophen (NORCO) 5-325 MG per tablet Take 1 tablet by mouth every 4 hours as needed for Pain for up to 3 days. Intended supply: 3 days. Take lowest dose possible to manage pain Max Daily Amount: 6 tablets 24 Yes Júnior Cueva MD   mirtazapine (REMERON SOL-TAB) 15 MG disintegrating tablet Take 1 tablet by mouth nightly 24  Yes Kathi Russo MD   lactulose (CHRONULAC) 10 GM/15ML solution Take 45 mLs by mouth 3 times daily 10/10/24  Yes Lopez Rosario PA   atorvastatin (LIPITOR) 40 MG tablet Take 1 tablet by mouth nightly 10/6/24  Yes Jaziel Cerda MD   magnesium oxide (MAG-OX) 400 MG tablet Take 1 tablet by mouth daily 24  Yes Lopez Rosario PA   pantoprazole (PROTONIX) 40 MG tablet Take 1 tablet by mouth in the morning and at bedtime 24  Yes Jessika Abad MD   sucralfate (CARAFATE) 1 GM tablet Take 1 tablet by mouth 4 times daily (before meals and nightly) 24  Yes Jessika Abad MD   metoprolol tartrate (LOPRESSOR) 25 MG tablet Take 0.5 tablets by mouth 2 times daily 24  Yes Jessika Abad MD   ferrous sulfate (IRON 325) 325 (65 Fe) MG tablet Take 1 tablet by mouth in the morning and at bedtime   Yes Dania Peoples MD   folic acid (FOLVITE) 1 MG tablet Take 1 tablet by mouth daily 5/3/24  Yes Roberto Workman MD   DULoxetine (CYMBALTA) 30 MG extended release capsule Take 1 capsule by mouth daily 24  Yes Hamida Loaiza MD   vitamin D (ERGOCALCIFEROL) 1.25 MG (64973 UT) CAPS capsule Take 1 capsule by mouth once a week

## 2024-11-22 NOTE — H&P
HISTORY and PHYSICAL  [facility]       NAME:  Frank Lisa  MRN: [id number]   YOB: 1960   Date: 11/22/2024   Age: 64 y.o.  Gender: male       COMPLAINT AND PRESENT HISTORY:     Frank Lisa is 64 y.o.,  male, presents for KYPHOPLASTY L4 & T10   Primary dx: Closed compression fracture of L4 lumbar vertebra, initial encounter ([facility]) [S32.040A]  Compression fracture of T10 vertebra ([facility]) [S22.070A].    Office note per Dr Cueva on 11/19/2024  This is a 64 y.o. male who presents to the clinic today for follow up of back pain.      Patient reports he had 4 Tylenols today and he still is in excruciating pain     He continues to have debilitating pain, he notes he is in more pain than when he was seen 2 weeks ago.     He was recently hospitalized for 4 days and was delirious during the hospitalization which makes him at higher risk for surgery. He was informed of this      Pain 8  out of 10 difficulty performing ADLs painful to roll over in bed  Diagnostic imaging:     AP lateral lumbar spine utilizing level count from CT scan November 5 patient status post kyphoplasty T12 and L2 chronic L1 compression deformity T10 superior endplate compression fracture known about new last visit not visualized new compression fracture L4 to be low L2 kyphoplasty (count can vary as patient really has 6 lumbar vertebrae we are using the most recent CT scan reading for reference)    UPDATE 11/22/2024  Frank Lisa is 64 y.o.,  male C/O of constant  deep aching to a screeching pain in the lumbar spine area with limitation of the range of motion, radiates to the lower limbs on the Rt and Lt side.  Associated symptoms pain radiates from back into hips/groin into legs.   The symptoms started many years ago.  Reports pain rated 10 /10, typically severe in intensity  Pain Aggravating by walking, standing, sitting for long time, or climbing the stairs.  Previous treatment: oral pain medication, PT done before

## 2024-11-22 NOTE — BRIEF OP NOTE
Brief Postoperative Note      Patient: Frank Lisa  YOB: 1960  MRN: [id number]    Date of Procedure: 11/22/2024    Pre-operatiave Diagnosis T11 and L4    Post-Op Diagnosis: Same       Procedure(s):  KYPHOPLASTY L4 & T11    Surgeon(s):  Júnior Cueva MD    Assistant:  * No surgical staff found *    Anesthesia: General    Estimated Blood Loss (mL): Minimal    Complications: None    Specimens:   * No specimens in log *    Implants:  Implant Name Type Inv. Item Serial No.  Lot No. LRB No. Used Action   CEMENT BNE HI VISC RADIOPAQUE KYPHON HV-R - [id number]  CEMENT BNE HI VISC RADIOPAQUE KYPHON HV-R  MED[employer]opboard SOFOR DANEK-  N/A 1 Implanted         Drains: * No LDAs found *    Findings:  Infection Present At Time Of Surgery (PATOS) (choose all levels that have infection present):  No infection present  Other Findings: see dictation    Electronically signed by Júnior Cueva MD on 11/22/2024 at 4:59 PM

## 2024-11-22 NOTE — ANESTHESIA POSTPROCEDURE EVALUATION
Department of Anesthesiology  Postprocedure Note    Patient: Frank Lisa  MRN: [id number]  YOB: 1960  Date of evaluation: 11/22/2024    Procedure Summary       Date: 11/22/24 Room / Location: [address]    Anesthesia Start: 1550 Anesthesia Stop: 1705    Procedure: KYPHOPLASTY L4 & T11 (Back) Diagnosis:       Closed compression fracture of L4 lumbar vertebra, initial encounter (HCC)      Compression fracture of T10 vertebra (HCC)      (Closed compression fracture of L4 lumbar vertebra, initial encounter (HCC) [S32.040A])      (Compression fracture of T10 vertebra (HCC) [S22.070A])    Surgeons: Júnior Cueva MD Responsible Provider: Genevieve Chang MD    Anesthesia Type: general ASA Status: 3            Anesthesia Type: No value filed.    Yen Phase I: Yen Score: 9    Yen Phase II:      Anesthesia Post Evaluation    Comments: POST- ANESTHESIA EVALUATION       Pt Name: Frank Lisa  MRN: [id number]  YOB: 1960  Date of evaluation: 11/22/2024  Time:  6:28 PM      BP (!) 163/77   Pulse 77   Temp 97.1 °F (36.2 °C) (Infrared)   Resp 12   Ht 1.778 m (5' 10\")   Wt 79.4 kg (175 lb)   SpO2 93%   BMI 25.11 kg/m²      Consciousness Level  Awake  Cardiopulmonary Status  Stable  Pain Adequately Treated YES  Nausea / Vomiting  NO  Adequate Hydration  YES  Anesthesia Related Complications NONE      Electronically signed by Genevieve Chang MD on 11/22/2024 at 6:28 PM      No notable events documented.

## 2024-11-25 NOTE — PROGRESS NOTES
CLINICAL PHARMACY NOTE: MEDS TO BEDS    Total # of Prescriptions Filled: 1   The following medications were delivered to the patient:  Hydrocodone/APAP 5-325MG Tablets    Additional Documentation:  Picked up at the pharmacy by Kami 11/22/24

## 2024-11-25 NOTE — OP NOTE
[id number] [place] [address]                            OPERATIVE REPORT      PATIENT NAME: ALHAJI COOLEY              : 1960  MED REC NO: [id number]                          ROOM: [facility]  ACCOUNT NO: [id number]                       ADMIT DATE: 2024  PROVIDER: Júnior Cueva MD      DATE OF PROCEDURE:  2024    SURGEON:  Júnior Cueva MD    PREOPERATIVE DIAGNOSES:  T11 and L4 compression fractures, osteoporotic levels in reference to a prior kyphoplasty of what we are calling 12. A previous chronic compression fracture at L1 and a previous kyphoplasty at L2.  (Levels can be confusing as the patient has a lumbarized S1).    POSTOPERATIVE DIAGNOSES:  T11 and L4 compression fractures, osteoporotic levels in reference to a prior kyphoplasty of what we are calling 12. A previous chronic compression fracture at L1 and a previous kyphoplasty at L2.  (Levels can be confusing as the patient has a lumbarized S1).    PROCEDURE PERFORMED:  Kyphoplasty T11 and L4 with fluoroscopic assistance.    ASSISTANT:  None.    ANESTHESIA:  General.    BLOOD LOSS:  Minimal.    COMPLICATIONS:  None.    SPECIMEN:  None.    IMPLANTS:  Kyphon HV-R cement.    DRAINS:  None.    FINDINGS:    1. The patient with a little bit of a cement leak right L4 superior endplate necessitating cement halting there.  2. The patient with a fracture cleft inferior endplate T11 with a nice fill throughout the vertebral body and then a very solid fill to the fracture cleft just above the inferior endplate.    PROCEDURE IN DETAIL:  The patient was taken to the operating room, placed under general anesthesia, transferred to the Brant table, and checked for padding and positioning.  AP and lateral fluoroscopy were arranged for the procedure.  Back was prepped and draped in usual sterile fashion.  18-gauge spinal needles were advanced from the skin to the pedicle

## 2024-11-26 ENCOUNTER — TELEPHONE (OUTPATIENT)
Dept: NEUROLOGY | Age: 64
End: 2024-11-26

## 2024-11-26 RX ORDER — LACTULOSE 10 G/15ML
30 SOLUTION ORAL 3 TIMES DAILY
Qty: 946 ML | Refills: 1 | Status: SHIPPED | OUTPATIENT
Start: 2024-11-26

## 2024-11-26 RX ORDER — PANTOPRAZOLE SODIUM 40 MG/1
40 TABLET, DELAYED RELEASE ORAL 2 TIMES DAILY
Qty: 60 TABLET | Refills: 3 | Status: SHIPPED | OUTPATIENT
Start: 2024-11-26

## 2024-11-26 NOTE — TELEPHONE ENCOUNTER
Called Frank to see if he would like us to send his EMG order to Dr. Huggins to get scheduled. Left message with information, asked patient to call us back.

## 2024-11-27 ENCOUNTER — TELEPHONE (OUTPATIENT)
Dept: ORTHOPEDIC SURGERY | Age: 64
End: 2024-11-27

## 2024-11-27 DIAGNOSIS — S32.040A CLOSED COMPRESSION FRACTURE OF L4 LUMBAR VERTEBRA, INITIAL ENCOUNTER (HCC): ICD-10-CM

## 2024-11-27 DIAGNOSIS — M48.54XS NON-TRAUMATIC COMPRESSION FRACTURE OF T11 THORACIC VERTEBRA, SEQUELA: ICD-10-CM

## 2024-11-27 NOTE — TELEPHONE ENCOUNTER
Pt requests refill of Norco. He does not remember the dosage. He says medication is not controlling pain and requests a stronger medication or dose. He says he has medication left until Friday.  DOS 11/22  Post-op 12/3    Pharmacy: [facility] in [place] on [address] [facility]

## 2024-11-29 DIAGNOSIS — S32.040A CLOSED COMPRESSION FRACTURE OF L4 LUMBAR VERTEBRA, INITIAL ENCOUNTER (HCC): ICD-10-CM

## 2024-11-29 DIAGNOSIS — M48.54XS NON-TRAUMATIC COMPRESSION FRACTURE OF T11 THORACIC VERTEBRA, SEQUELA: ICD-10-CM

## 2024-11-30 RX ORDER — HYDROCODONE BITARTRATE AND ACETAMINOPHEN 5; 325 MG/1; MG/1
1 TABLET ORAL EVERY 6 HOURS PRN
Qty: 28 TABLET | Refills: 0 | Status: SHIPPED | OUTPATIENT
Start: 2024-11-30 | End: 2024-12-07

## 2024-12-03 ENCOUNTER — OFFICE VISIT (OUTPATIENT)
Dept: ORTHOPEDIC SURGERY | Age: 64
End: 2024-12-03
Payer: COMMERCIAL

## 2024-12-03 VITALS — RESPIRATION RATE: 14 BRPM | WEIGHT: 198 LBS | BODY MASS INDEX: 28.35 KG/M2 | HEIGHT: 70 IN

## 2024-12-03 DIAGNOSIS — M48.54XS NON-TRAUMATIC COMPRESSION FRACTURE OF T11 THORACIC VERTEBRA, SEQUELA: ICD-10-CM

## 2024-12-03 DIAGNOSIS — S32.040A CLOSED COMPRESSION FRACTURE OF L4 LUMBAR VERTEBRA, INITIAL ENCOUNTER (HCC): Primary | ICD-10-CM

## 2024-12-03 PROCEDURE — 99213 OFFICE O/P EST LOW 20 MIN: CPT | Performed by: ORTHOPAEDIC SURGERY

## 2024-12-03 PROCEDURE — 1123F ACP DISCUSS/DSCN MKR DOCD: CPT | Performed by: ORTHOPAEDIC SURGERY

## 2024-12-03 NOTE — TELEPHONE ENCOUNTER
Dr. Cueva,     Pharmacy requests refill of Norco 5-325 MG Q6H 28# 0 refills. Last refill 11/30/24. First request since kyphoplasty on 11/22/24.

## 2024-12-03 NOTE — PROGRESS NOTES
GASTROINTESTINAL ENDOSCOPY N/A 08/02/2023    EGD WITH APC TO SMALL BOWEL AVM AND RESOLUTION CLIP APPLIED TO GE JUNCTION performed by Dixon Olivia MD at [facility] ENDO    UPPER GASTROINTESTINAL ENDOSCOPY N/A 01/09/2024    EGD BIOPSY ESOPHAGOGASTRODUODENOSCOPY PEG TUBE INSERTION performed by Kole Guo MD at [facility] ENDO    UPPER GASTROINTESTINAL ENDOSCOPY N/A 05/10/2024    EGD BIOPSY WITH REMOVAL PERCUTANEOUS ENDOSCOPIC GASTROSTOMY TUBE performed by Kole Guo MD at [facility] ENDO    UPPER GASTROINTESTINAL ENDOSCOPY N/A 06/26/2024    ESOPHAGOGASTRODUODENOSCOPY performed by Kole Guo MD at [facility] ENDO    VENTRAL HERNIA REPAIR N/A 07/12/2024    OPEN HERNIA VENTRAL REPAIR  x2  (1 MIDLINE AND ONE RIGHT LOWER QUADRANT) WITH MESH performed by Kvng Waddell DO at [facility] OR    WISDOM TOOTH EXTRACTION       Family History   Problem Relation Age of Onset    Cancer Mother         pancreatic    Cancer Father         bone    Diabetes Sister     Asthma Brother     Allergies Brother         MULTIPLE        Physical Exam:  Vitals signs and nursing note reviewed.   Constitutional:       Appearance: well-developed.   HENT:      Head: Normocephalic and atraumatic.      Nose: Nose normal.   Eyes:      Conjunctiva/sclera: Conjunctivae normal.   Neck:      Musculoskeletal: Normal range of motion and neck supple.   Pulmonary:      Effort: Pulmonary effort is normal. No respiratory distress.   Musculoskeletal:      Comments: Normal gait     Skin:     General: Skin is warm and dry.   Neurological:      Mental Status: Alert and oriented to person, place, and time.      Sensory: No sensory deficit.   Psychiatric:         Behavior: Behavior normal.         Thought Content: Thought content normal.        Provider Attestation:  I, Júnior Cueva, personally performed the services described in this documentation. All medical record entries made by the scribe were at my direction and in my presence. I have reviewed the chart and discharge instructions

## 2024-12-04 ENCOUNTER — TELEPHONE (OUTPATIENT)
Dept: VASCULAR SURGERY | Age: 64
End: 2024-12-04

## 2024-12-04 RX ORDER — HYDROCODONE BITARTRATE AND ACETAMINOPHEN 5; 325 MG/1; MG/1
TABLET ORAL
Qty: 28 TABLET | Refills: 0 | OUTPATIENT
Start: 2024-12-04

## 2024-12-05 ENCOUNTER — TELEPHONE (OUTPATIENT)
Dept: GASTROENTEROLOGY | Age: 64
End: 2024-12-05

## 2024-12-05 NOTE — TELEPHONE ENCOUNTER
Received message from Renu with [facility]. She was inquiring if patient followed up with Dr. Olivia or if there were any any appointments scheduled for patient. Called [phone], left message for Renu advising her patient was seen by Dr. Olivia on 10/17/24 and has a follow up appointment scheduled on 1/23/25.

## 2024-12-06 ENCOUNTER — TELEPHONE (OUTPATIENT)
Dept: PRIMARY CARE CLINIC | Age: 64
End: 2024-12-06

## 2024-12-06 NOTE — TELEPHONE ENCOUNTER
Patient not wanting to do physical therapy/occupational therapy, asking for ok to discharge patient? States there is nothing that nursing could do for him.    Rosie fraser/[facility]  [phone]  Ok for detailed VM if no answer

## 2024-12-08 ENCOUNTER — APPOINTMENT (OUTPATIENT)
Dept: CT IMAGING | Age: 64
End: 2024-12-08
Payer: COMMERCIAL

## 2024-12-08 ENCOUNTER — HOSPITAL ENCOUNTER (EMERGENCY)
Age: 64
Discharge: HOME | End: 2024-12-08
Attending: EMERGENCY MEDICINE
Payer: COMMERCIAL

## 2024-12-08 ENCOUNTER — APPOINTMENT (OUTPATIENT)
Dept: GENERAL RADIOLOGY | Age: 64
End: 2024-12-08
Payer: COMMERCIAL

## 2024-12-08 VITALS
WEIGHT: 198 LBS | OXYGEN SATURATION: 99 % | SYSTOLIC BLOOD PRESSURE: 135 MMHG | HEART RATE: 95 BPM | DIASTOLIC BLOOD PRESSURE: 87 MMHG | RESPIRATION RATE: 13 BRPM | BODY MASS INDEX: 28.35 KG/M2 | TEMPERATURE: 97.6 F | HEIGHT: 70 IN

## 2024-12-08 DIAGNOSIS — F10.920 ACUTE ALCOHOLIC INTOXICATION WITHOUT COMPLICATION (HCC): ICD-10-CM
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
## 2024-12-08 LAB
25(OH)D3 SERPL-MCNC: 28.1 NG/ML (ref 30–100)
ABO + RH BLD: NORMAL
ALBUMIN SERPL-MCNC: 3 G/DL (ref 3.5–5.2)
ANION GAP SERPL CALCULATED.3IONS-SCNC: 12 MMOL/L (ref 9–16)
ARM BAND NUMBER: NORMAL
BACTERIA URNS QL MICRO: NORMAL
BILIRUB UR QL STRIP: NEGATIVE
BLOOD BANK SAMPLE EXPIRATION: NORMAL
BLOOD BANK SPECIMEN: ABNORMAL
BLOOD GROUP ANTIBODIES SERPL: NEGATIVE
BODY TEMPERATURE: 37
BUN SERPL-MCNC: 8 MG/DL (ref 8–23)
CASTS #/AREA URNS LPF: NORMAL /LPF (ref 0–8)
CHLORIDE SERPL-SCNC: 107 MMOL/L (ref 98–107)
CLARITY UR: CLEAR
CO2 SERPL-SCNC: 21 MMOL/L (ref 20–31)
COHGB MFR BLD: 1.1 % (ref 0–5)
COLOR UR: YELLOW
CREAT SERPL-MCNC: 1.1 MG/DL (ref 0.7–1.2)
EPI CELLS #/AREA URNS HPF: NORMAL /HPF (ref 0–5)
ERYTHROCYTE [DISTWIDTH] IN BLOOD BY AUTOMATED COUNT: 14.6 % (ref 11.8–14.4)
EST. AVERAGE GLUCOSE BLD GHB EST-MCNC: 71 MG/DL
ETHANOL PERCENT: 0.24 %
ETHANOLAMINE SERPL-MCNC: 237 MG/DL (ref 0–0.08)
FIO2 ON VENT: ABNORMAL %
GFR, ESTIMATED: 46 ML/MIN/1.73M2
GLUCOSE SERPL-MCNC: 96 MG/DL (ref 74–99)
GLUCOSE UR STRIP-MCNC: NEGATIVE MG/DL
HBA1C MFR BLD: 4.1 % (ref 4–6)
HCO3 VENOUS: 22.9 MMOL/L (ref 24–30)
HCT VFR BLD AUTO: 27.9 % (ref 40.7–50.3)
HGB BLD-MCNC: 8.7 G/DL (ref 13–17)
HGB UR QL STRIP.AUTO: NEGATIVE
INR PPP: 1.3
KETONES UR STRIP-MCNC: NEGATIVE MG/DL
LEUKOCYTE ESTERASE UR QL STRIP: NEGATIVE
MCH RBC QN AUTO: 28.9 PG (ref 25.2–33.5)
MCHC RBC AUTO-ENTMCNC: 31.2 G/DL (ref 28.4–34.8)
MCV RBC AUTO: 92.7 FL (ref 82.6–102.9)
MYOGLOBIN SERPL-MCNC: 79 NG/ML (ref 28–72)
NEGATIVE BASE EXCESS, VEN: 2.5 MMOL/L (ref 0–2)
NITRITE UR QL STRIP: NEGATIVE
NRBC BLD-RTO: 0 PER 100 WBC
O2 SAT, VEN: 53.4 % (ref 60–85)
PARTIAL THROMBOPLASTIN TIME: 31.9 SEC (ref 23–36.5)
PCO2 VENOUS: 45.8 MM HG (ref 39–55)
PH UR STRIP: 5.5 [PH] (ref 5–8)
PH VENOUS: 7.32 (ref 7.32–7.42)
PLATELET # BLD AUTO: 166 K/UL (ref 138–453)
PMV BLD AUTO: 9.4 FL (ref 8.1–13.5)
PO2 VENOUS: 32.7 MM HG (ref 30–50)
POTASSIUM SERPL-SCNC: 3.3 MMOL/L (ref 3.7–5.3)
PROT UR STRIP-MCNC: NEGATIVE MG/DL
PROTHROMBIN TIME: 15.5 SEC (ref 11.7–14.9)
RBC # BLD AUTO: 3.01 M/UL (ref 4.21–5.77)
RBC #/AREA URNS HPF: NORMAL /HPF (ref 0–4)
SODIUM SERPL-SCNC: 140 MMOL/L (ref 136–145)
SP GR UR STRIP: 1.01 (ref 1–1.03)
T4 FREE SERPL-MCNC: 1.2 NG/DL (ref 0.92–1.68)
TROPONIN I SERPL HS-MCNC: 17 NG/L (ref 0–22)
TSH SERPL DL<=0.05 MIU/L-ACNC: 1.2 UIU/ML (ref 0.27–4.2)
UROBILINOGEN UR STRIP-ACNC: NORMAL EU/DL (ref 0–1)
VIT B12 SERPL-MCNC: 1425 PG/ML (ref 232–1245)
WBC #/AREA URNS HPF: NORMAL /HPF (ref 0–5)
WBC OTHER # BLD: 8.5 K/UL (ref 3.5–11.3)

## 2024-12-08 PROCEDURE — 84484 ASSAY OF TROPONIN QUANT: CPT

## 2024-12-08 PROCEDURE — 86901 BLOOD TYPING SEROLOGIC RH(D): CPT

## 2024-12-08 PROCEDURE — 70450 CT HEAD/BRAIN W/O DYE: CPT

## 2024-12-08 PROCEDURE — 72125 CT NECK SPINE W/O DYE: CPT

## 2024-12-08 PROCEDURE — 85610 PROTHROMBIN TIME: CPT

## 2024-12-08 PROCEDURE — 70486 CT MAXILLOFACIAL W/O DYE: CPT

## 2024-12-08 PROCEDURE — 96375 TX/PRO/DX INJ NEW DRUG ADDON: CPT | Performed by: EMERGENCY MEDICINE

## 2024-12-08 PROCEDURE — 6360000002 HC RX W HCPCS: Performed by: STUDENT IN AN ORGANIZED HEALTH CARE EDUCATION/TRAINING PROGRAM

## 2024-12-08 PROCEDURE — 73090 X-RAY EXAM OF FOREARM: CPT

## 2024-12-08 PROCEDURE — 6370000000 HC RX 637 (ALT 250 FOR IP): Performed by: STUDENT IN AN ORGANIZED HEALTH CARE EDUCATION/TRAINING PROGRAM

## 2024-12-08 PROCEDURE — 96365 THER/PROPH/DIAG IV INF INIT: CPT | Performed by: EMERGENCY MEDICINE

## 2024-12-08 PROCEDURE — 84703 CHORIONIC GONADOTROPIN ASSAY: CPT

## 2024-12-08 PROCEDURE — 82565 ASSAY OF CREATININE: CPT

## 2024-12-08 PROCEDURE — 82805 BLOOD GASES W/O2 SATURATION: CPT

## 2024-12-08 PROCEDURE — 82306 VITAMIN D 25 HYDROXY: CPT

## 2024-12-08 PROCEDURE — 86900 BLOOD TYPING SEROLOGIC ABO: CPT

## 2024-12-08 PROCEDURE — 99285 EMERGENCY DEPT VISIT HI MDM: CPT | Performed by: EMERGENCY MEDICINE

## 2024-12-08 PROCEDURE — 73130 X-RAY EXAM OF HAND: CPT

## 2024-12-08 PROCEDURE — 86850 RBC ANTIBODY SCREEN: CPT

## 2024-12-08 PROCEDURE — 6360000004 HC RX CONTRAST MEDICATION

## 2024-12-08 PROCEDURE — 82040 ASSAY OF SERUM ALBUMIN: CPT

## 2024-12-08 PROCEDURE — 84443 ASSAY THYROID STIM HORMONE: CPT

## 2024-12-08 PROCEDURE — 2580000003 HC RX 258

## 2024-12-08 PROCEDURE — 2500000003 HC RX 250 WO HCPCS

## 2024-12-08 PROCEDURE — 87086 URINE CULTURE/COLONY COUNT: CPT

## 2024-12-08 PROCEDURE — 82607 VITAMIN B-12: CPT

## 2024-12-08 PROCEDURE — 71260 CT THORAX DX C+: CPT

## 2024-12-08 PROCEDURE — 85730 THROMBOPLASTIN TIME PARTIAL: CPT

## 2024-12-08 PROCEDURE — 84520 ASSAY OF UREA NITROGEN: CPT

## 2024-12-08 PROCEDURE — 80051 ELECTROLYTE PANEL: CPT

## 2024-12-08 PROCEDURE — 93005 ELECTROCARDIOGRAM TRACING: CPT | Performed by: STUDENT IN AN ORGANIZED HEALTH CARE EDUCATION/TRAINING PROGRAM

## 2024-12-08 PROCEDURE — 73110 X-RAY EXAM OF WRIST: CPT

## 2024-12-08 PROCEDURE — 82947 ASSAY GLUCOSE BLOOD QUANT: CPT

## 2024-12-08 PROCEDURE — 83874 ASSAY OF MYOGLOBIN: CPT

## 2024-12-08 PROCEDURE — 81001 URINALYSIS AUTO W/SCOPE: CPT

## 2024-12-08 PROCEDURE — 85027 COMPLETE CBC AUTOMATED: CPT

## 2024-12-08 PROCEDURE — G0480 DRUG TEST DEF 1-7 CLASSES: HCPCS

## 2024-12-08 PROCEDURE — 6810039001 HC L1 TRAUMA PRIORITY

## 2024-12-08 PROCEDURE — 6360000002 HC RX W HCPCS

## 2024-12-08 PROCEDURE — 83036 HEMOGLOBIN GLYCOSYLATED A1C: CPT

## 2024-12-08 PROCEDURE — 84439 ASSAY OF FREE THYROXINE: CPT

## 2024-12-08 PROCEDURE — 96361 HYDRATE IV INFUSION ADD-ON: CPT | Performed by: EMERGENCY MEDICINE

## 2024-12-08 RX ORDER — IOPAMIDOL 755 MG/ML
130 INJECTION, SOLUTION INTRAVASCULAR
Status: COMPLETED | OUTPATIENT
Start: 2024-12-08 | End: 2024-12-08

## 2024-12-08 RX ORDER — FENTANYL CITRATE 50 UG/ML
50 INJECTION, SOLUTION INTRAMUSCULAR; INTRAVENOUS ONCE
Status: COMPLETED | OUTPATIENT
Start: 2024-12-08 | End: 2024-12-08

## 2024-12-08 RX ORDER — 0.9 % SODIUM CHLORIDE 0.9 %
1000 INTRAVENOUS SOLUTION INTRAVENOUS ONCE
Status: COMPLETED | OUTPATIENT
Start: 2024-12-08 | End: 2024-12-08

## 2024-12-08 RX ORDER — POTASSIUM CHLORIDE 1500 MG/1
40 TABLET, EXTENDED RELEASE ORAL ONCE
Status: COMPLETED | OUTPATIENT
Start: 2024-12-08 | End: 2024-12-08

## 2024-12-08 RX ADMIN — SODIUM CHLORIDE 1000 ML: 9 INJECTION, SOLUTION INTRAVENOUS at 13:15

## 2024-12-08 RX ADMIN — THIAMINE HYDROCHLORIDE: 100 INJECTION, SOLUTION INTRAMUSCULAR; INTRAVENOUS at 14:44

## 2024-12-08 RX ADMIN — FENTANYL CITRATE 50 MCG: 50 INJECTION, SOLUTION INTRAMUSCULAR; INTRAVENOUS at 14:22

## 2024-12-08 RX ADMIN — POTASSIUM CHLORIDE 40 MEQ: 1500 TABLET, EXTENDED RELEASE ORAL at 14:49

## 2024-12-08 RX ADMIN — IOPAMIDOL 130 ML: 755 INJECTION, SOLUTION INTRAVENOUS at 13:03

## 2024-12-08 ASSESSMENT — LIFESTYLE VARIABLES
HOW OFTEN DO YOU HAVE A DRINK CONTAINING ALCOHOL: 2-4 TIMES A MONTH
HOW MANY STANDARD DRINKS CONTAINING ALCOHOL DO YOU HAVE ON A TYPICAL DAY: 3 OR 4
HOW OFTEN DO YOU HAVE A DRINK CONTAINING ALCOHOL: 2-4 TIMES A MONTH
HOW MANY STANDARD DRINKS CONTAINING ALCOHOL DO YOU HAVE ON A TYPICAL DAY: 3 OR 4

## 2024-12-08 ASSESSMENT — PAIN - FUNCTIONAL ASSESSMENT: PAIN_FUNCTIONAL_ASSESSMENT: NONE - DENIES PAIN

## 2024-12-08 NOTE — PROGRESS NOTES
[facility]     [place]    PATIENT NAME: Dt Trauma ALHAJI Harris    Shift date: 12-  Shift day: Sunday   Shift # 1    Room # TRAUMA A/TRAUMAA   Name: Dt Trauma Angel            Age: 89 y.o.  Gender: male          Pentecostal: Unknown    Place of Voodoo: Unknown    Trauma/Incident type: Adult Trauma Priority  Admit Date & Time: 12/8/2024 12:40 PM  TRAUMA NAME: Justin Germain Trau    ADVANCE DIRECTIVES IN CHART?  UNKNOWN    NAME OF DECISION MAKER: Assumed pt.    RELATIONSHIP OF DECISION MAKER TO PATIENT: self       12/08/24 1307   Encounter Summary   Encounter Overview/Reason Crisis   Service Provided For Patient   Referral/Consult From Multi-disciplinary team   Support System Family members   Last Encounter  12/08/24   Complexity of Encounter Moderate   Crisis   Type Trauma  (ATP @ 12:39--Fall)   Assessment/Intervention/Outcome   Assessment Anxious  (Pt brought in from home where he fell in his garage.  Son was near by and found pt down.  Pt has ETOH hx.  Pt brought in by EMS.  Pt's cell phone was in front eliza pocket when bagged.  EMS indicated son would be on way.)   Intervention Sustaining Presence/Ministry of presence;Other (Comment)  (Responded to ER, present when pt arrived.  Received info from EMS and gave info to Registration.  Pt's cellphone, jeans, shoes & one sock placed in belongings bag. No interaction with pt.)   Outcome Other (comment)  (Referral to Fr. Puentes for continued support.)     PATIENT BELONGINGS:  With patient    ANY BELONGINGS OF SIGNIFICANT VALUE NOTED:  Cell phone in eliza front pocket.    REGISTRATION STAFF NOTIFIED?  Yes    WHAT IS YOUR SPIRITUAL CARE PLAN FOR THIS PATIENT?:  Remain available and follow-up if requested.    Electronically signed by Chaplain Giles, on 12/8/2024 at 1:15 PM.  [facility]  [phone]

## 2024-12-08 NOTE — ED PROVIDER NOTES
[facility] EMERGENCY DEPARTMENT  Emergency Department  Emergency Medicine Resident Sign-out     Care of Frank Lisa was assumed from Dr. Velazco and is being seen for Fall and Alcohol Intoxication  .  The patient's initial evaluation and plan have been discussed with the prior provider who initially evaluated the patient.     EMERGENCY DEPARTMENT COURSE / MEDICAL DECISION MAKING:       MEDICATIONS GIVEN:  Orders Placed This Encounter   Medications    iopamidol (ISOVUE-370) 76 % injection 130 mL    folic acid 1 mg, thiamine (B-1) 100 mg in sodium chloride 0.9 % 50 mL IVPB    sodium chloride 0.9 % bolus 1,000 mL    fentaNYL (SUBLIMAZE) injection 50 mcg    potassium chloride (KLOR-CON M) extended release tablet 40 mEq       LABS / RADIOLOGY:     Labs Reviewed   ALBUMIN - Abnormal; Notable for the following components:       Result Value    Albumin 3.0 (*)     All other components within normal limits   VITAMIN B12 - Abnormal; Notable for the following components:    Vitamin B-12 1425 (*)     All other components within normal limits   TROP/MYOGLOBIN - Abnormal; Notable for the following components:    Myoglobin 79 (*)     All other components within normal limits   TRAUMA PANEL - Abnormal; Notable for the following components:    Ethanol Lvl 237 (*)     Ethanol percent 0.237 (*)     RBC 3.01 (*)     Hemoglobin 8.7 (*)     Hematocrit 27.9 (*)     RDW 14.6 (*)     Est, Glom Filt Rate 46 (*)     Potassium 3.3 (*)     Protime 15.5 (*)     HCO3, Venous 22.9 (*)     Negative Base Excess, Ryan 2.5 (*)     O2 Sat, Ryan 53.4 (*)     All other components within normal limits   VITAMIN D 25 HYDROXY - Abnormal; Notable for the following components:    Vit D, 25-Hydroxy 28.1 (*)     All other components within normal limits   CULTURE, URINE   T4, FREE   HEMOGLOBIN A1C   TSH   URINALYSIS WITH MICROSCOPIC   AMMONIA   HEPATIC FUNCTION PANEL   TYPE AND SCREEN       CT CHEST ABDOMEN PELVIS W CONTRAST Additional Contrast? 
[facility]     Emergency Department     Faculty Attestation    I performed a history and physical examination of the patient and discussed management with the resident. I reviewed the resident's note and agree with the documented findings and plan of care. Any areas of disagreement are noted on the chart. I was personally present for the key portions of any procedures. I have documented in the chart those procedures where I was not present during the key portions. I have reviewed the emergency nurses triage note. I agree with the chief complaint, past medical history, past surgical history, allergies, medications, social and family history as documented unless otherwise noted below. For Physician Assistant/ Nurse Practitioner cases/documentation I have personally evaluated this patient and have completed at least one if not all key elements of the E/M (history, physical exam, and MDM). Additional findings are as noted.    Trauma priority, good airway, equal breath sounds, heart tones normal, pupils 3 mm reactive, clinically intoxicated, GCS 14.     Armen Noel MD  12/08/24 1257    
posterior oropharyngeal erythema.   Eyes:      Extraocular Movements: Extraocular movements intact.      Conjunctiva/sclera: Conjunctivae normal.      Pupils: Pupils are equal, round, and reactive to light.   Neck:      Comments: C-collar in place.  No midline or paraspinal cervical spine tenderness with palpation  Cardiovascular:      Rate and Rhythm: Normal rate and regular rhythm.      Pulses: Normal pulses.      Heart sounds: Normal heart sounds.   Pulmonary:      Effort: Pulmonary effort is normal. No respiratory distress.      Breath sounds: Normal breath sounds. No wheezing.   Abdominal:      General: There is no distension.      Palpations: Abdomen is soft.      Tenderness: There is no abdominal tenderness. There is no guarding or rebound.   Musculoskeletal:         General: Deformity (Nasal bridge) present. No swelling or tenderness.      Cervical back: Neck supple.   Skin:     General: Skin is warm and dry.      Findings: No erythema or rash.      Comments: Hematoma right forehead just above right eyebrow with periorbital bruising.  Minor superficial skin abrasions periorbitally, upper lip, and on chin with bleeding controlled.   Neurological:      Mental Status: He is alert and oriented to person, place, and time.      Comments: GCS 15.  Moving all 4 extremities with good strength and coordination   Psychiatric:      Comments: Intoxicated           DDX/DIAGNOSTIC RESULTS / EMERGENCY DEPARTMENT COURSE / MDM     Medical Decision Making  Elderly appearing male with unclear medical history who presents emergency department via EMS as a trauma priority after a fall.  Intoxicated on arrival.  C-collar placed by EMS.  Negative E-FAST.  Patient was immediately met by trauma surgery team in the trauma bay.  See HPI and physical exam for further detail.  Trauma surgery team to guide labs and images.  Will obtain EKG due to fall however presentation is likely secondary to intoxication with mechanical fall.  Alert 
40

## 2024-12-08 NOTE — H&P
8.7 (*)     Hematocrit 27.9 (*)     RDW 14.6 (*)     Est, Glom Filt Rate 46 (*)     Potassium 3.3 (*)     Protime 15.5 (*)     HCO3, Venous 22.9 (*)     Negative Base Excess, Ryan 2.5 (*)     O2 Sat, Ryan 53.4 (*)     All other components within normal limits   VITAMIN D 25 HYDROXY - Abnormal; Notable for the following components:    Vit D, 25-Hydroxy 28.1 (*)     All other components within normal limits   CULTURE, URINE   T4, FREE   HEMOGLOBIN A1C   TSH   URINALYSIS WITH MICROSCOPIC   AMMONIA   HEPATIC FUNCTION PANEL   TYPE AND SCREEN       Kiko Hicks MD  PGY-1  Trauma Surgery Service  12/8/24, 3:43 PM

## 2024-12-08 NOTE — PROGRESS NOTES
15 Variable Trauma-Specific Frailty Index   Comorbidities   Cancer History Yes (1) No (0)   Coronary Heart Disease MI (1) CABG (0.75) Mild (0.25)  No (0)   Dementia Severe (1) Moderate (0.5) Mild (0.25)  No (0)   Daily Activities   Help With Grooming Yes (1) No (0)   Help with Managing Money Yes (1) No (0)   Help doing Housework Yes (1) No (0)   Help with Toileting Yes (1) No (0)   Help Walking Wheelchair (1) Walker (0.75) Cane (0.5) No (0)   Health Attitude   Feel Less Useful Most Time (1) Sometimes (0.5) Never (0)   Feel Sad Most Time (1) Sometimes (0.5) Never (0)   Feel Effort to Do Everything Most Time (1) Sometimes (0.5) Never (0)   Feels Lonely Most Time (1) Sometimes (0.5) Never (0)   Falls Most Time (1) Sometimes (0.5) Never (0)   Function   Sexually Active Yes (0)  No(1)   Nutrition   Albumin < 3g/dL (1)  > 3g/dL   Scoring   Score  4.5 FI (4.5/15)  0.3 > 0.25 = Frail   *Based on 2-weeks prior to hospital admission   Trauma Specific Fraility Index > or = to 4 (4/15 = 0.26)  Trauma Specific Fraility Index Score:    4.5/15 = 0.3 = Frail     Kiko Hicks MD  PGY-1  Trauma Surgery Service  12/8/2024, 2:38 PM

## 2024-12-08 NOTE — ED NOTES
Pt arrrived to [facility] from [facility], pt fall on concert, face first, nose deformity.  24oz beer  5-6 episode of LOC per EMS

## 2024-12-08 NOTE — DISCHARGE INSTRUCTIONS
You are seen Emergency Department for a fall.  Imaging study shows no acute injuries.  He has several scrapes and bruises.  He may feel sore for the next couple days.  You can use Tylenol Motrin at home to help out with this.  Please follow-up primary care provider next 1 to 2 weeks for reevaluation.  Please return emerged part for any new or worsening symptoms.

## 2024-12-08 NOTE — PROGRESS NOTES
Evaluation for Spine Clearance:    Pt is a 64 year old male who was admitted on 12/08/2024 s/p FFSH, +LOC, -AC, +Etoh.   Pt w/ complaints of pain to forehead.      C-Spine precautions of C-collar with spinal neutrality maintained since arrival with current exam directed at further evaluation of spine for clearance purposes.    Pt chart and current images reviewed.  CT C-Spine negative for acute fracture, subluxation, or traumatic injury.  Patient does not have a distracting injury, is not acutely intoxicated and is alert, oriented and fully able to participate in exam.      Pt denies c-spine pain while resting in c-collar.  C-collar removed w/ c-spine neutrality maintained.  Pt denies midline pain with palpation of spinous processes and axial loading.  Pt demonstrated full flexion, extension, and SB ROM without complaints of pain.     TLS precautions of supine position maintained since arrival.  Pt denies midline pain with palpation of spinous processes. CT dorsal lumbar negative for acute fracture, subluxation, or traumatic injury.      C-spine is considered cleared w/out need for further imaging, evaluation, or continuation of c-collar.  TLS considered clear w/out need for further imagine, evaluation, or continuation of supine bedrest precautions.    Electronically signed by Kiko Hicks MD on 12/8/2024 at 2:15 PM

## 2024-12-09 LAB
MICROORGANISM SPEC CULT: NO GROWTH
SERVICE CMNT-IMP: NORMAL
SPECIMEN DESCRIPTION: NORMAL

## 2024-12-10 LAB
EKG ATRIAL RATE: 97 BPM
EKG P AXIS: 7 DEGREES
EKG P-R INTERVAL: 146 MS
EKG Q-T INTERVAL: 372 MS
EKG QRS DURATION: 72 MS
EKG QTC CALCULATION (BAZETT): 472 MS
EKG R AXIS: 34 DEGREES
EKG T AXIS: -7 DEGREES
EKG VENTRICULAR RATE: 97 BPM

## 2024-12-10 PROCEDURE — 93010 ELECTROCARDIOGRAM REPORT: CPT | Performed by: INTERNAL MEDICINE

## 2024-12-11 ENCOUNTER — TELEPHONE (OUTPATIENT)
Dept: PRIMARY CARE CLINIC | Age: 64
End: 2024-12-11

## 2024-12-11 NOTE — TELEPHONE ENCOUNTER
Cynthia from [facility] called to report patient was in ER on Sunday. Bruising around right eye, laceration is scabbing over, right wrist sprained and bruised, his blood pressure today was reading 178/96. Pain rate is a 4.

## 2024-12-12 ENCOUNTER — TELEPHONE (OUTPATIENT)
Dept: PRIMARY CARE CLINIC | Age: 64
End: 2024-12-12

## 2024-12-12 RX ORDER — ONDANSETRON 8 MG/1
8 TABLET, ORALLY DISINTEGRATING ORAL 3 TIMES DAILY PRN
Qty: 90 TABLET | Refills: 2 | Status: SHIPPED | OUTPATIENT
Start: 2024-12-12 | End: 2024-12-12

## 2024-12-12 NOTE — TELEPHONE ENCOUNTER
Make sure patient is taking blood pressure meds.  Continue to monitor blood pressure and go to ER with any chest pain, or shortness of breath.  Or if systolic blood pressure greater than 190.

## 2024-12-12 NOTE — TELEPHONE ENCOUNTER
Per pharmacy, patient's insurance is stating patient has long QT syndrome and zofran puts him at major risk for torsades. Ok for them to fill?

## 2024-12-12 NOTE — TELEPHONE ENCOUNTER
Patient is also requesting a script for gabapentin for neuropathy associated with his recent spinal surgery.

## 2024-12-13 ENCOUNTER — TELEPHONE (OUTPATIENT)
Dept: PRIMARY CARE CLINIC | Age: 64
End: 2024-12-13

## 2024-12-17 NOTE — TELEPHONE ENCOUNTER
Call received, discussed message with patient who verbalized understanding.   
Patient states he is nauseous and is asking for a medication in place of the zofran that was cancelled yesterday. [facility]   
Please have patient follow up with gastro for this nausea relief. Otherwise can try OTC barbara chews to try to help.   
05-Oct-2019 15:56
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
## 2025-01-12 NOTE — OP NOTE
Brief Postoperative Note    Trupti Borders  YOB: 1960  [id number]    Pre-operative Diagnosis: ascites    Post-operative Diagnosis: Same    Procedure: paracentesis    Anesthesia: Local   Surgeons/Assistants: Nikolai Pereira MD     Estimated Blood Loss: minimal    Complications: none immediate    Specimens: were obtained      Electronically signed by Nikolai Pereira MD on 11/11/2022 at 2:32 PM Myself

## 2025-01-13 ENCOUNTER — INITIAL CONSULT (OUTPATIENT)
Dept: VASCULAR SURGERY | Age: 65
End: 2025-01-13
Payer: COMMERCIAL

## 2025-01-13 VITALS
BODY MASS INDEX: 28.49 KG/M2 | SYSTOLIC BLOOD PRESSURE: 153 MMHG | RESPIRATION RATE: 16 BRPM | WEIGHT: 199 LBS | HEART RATE: 83 BPM | DIASTOLIC BLOOD PRESSURE: 79 MMHG | HEIGHT: 70 IN | OXYGEN SATURATION: 93 %

## 2025-01-13 DIAGNOSIS — Z95.828 S/P TIPS (TRANSJUGULAR INTRAHEPATIC PORTOSYSTEMIC SHUNT): ICD-10-CM

## 2025-01-13 DIAGNOSIS — I72.8 SPLENIC ARTERY ANEURYSM (HCC): Primary | ICD-10-CM

## 2025-01-13 DIAGNOSIS — B18.2 CHRONIC HEPATITIS C WITHOUT HEPATIC COMA (HCC): ICD-10-CM

## 2025-01-13 DIAGNOSIS — K70.30 ALCOHOLIC CIRRHOSIS, UNSPECIFIED WHETHER ASCITES PRESENT (HCC): ICD-10-CM

## 2025-01-13 PROCEDURE — 99203 OFFICE O/P NEW LOW 30 MIN: CPT | Performed by: SURGERY

## 2025-01-13 PROCEDURE — 3078F DIAST BP <80 MM HG: CPT | Performed by: SURGERY

## 2025-01-13 PROCEDURE — 1123F ACP DISCUSS/DSCN MKR DOCD: CPT | Performed by: SURGERY

## 2025-01-13 PROCEDURE — 3077F SYST BP >= 140 MM HG: CPT | Performed by: SURGERY

## 2025-01-13 NOTE — PROGRESS NOTES
gangrene.  He does not seem to have ascites on abdominal examination.  He has multiple incisional scars on his belly.    Assessment:  1. Splenic artery aneurysm (HCC)    2. Alcoholic cirrhosis, unspecified whether ascites present (HCC)    3. Chronic hepatitis C without hepatic coma (HCC)    4. S/P TIPS (transjugular intrahepatic portosystemic shunt)          Plan:  At this point I would like to see him back in 3 months with a CTA of the abdomen and pelvis.  It might be that his splenic artery aneurysm is in fact thrombosed which would likely reduce his risk of rupture.  Even if it is thrombosed we would need to follow it at least yearly.  When it gets to 3 if ever, he would need repair.  Trans peritoneal repair or open abdominal repair would be ill-advised due to the potential for varices and his cirrhosis.  I think the only reasonable repair would be either placement of a covered stent across the orifice of the aneurysm or coiling of the entirety of the splenic artery.  We will see him in 3 months with his CTA.    Electronically signed by:  Júnior Tripp MD

## 2025-01-23 ENCOUNTER — OFFICE VISIT (OUTPATIENT)
Dept: GASTROENTEROLOGY | Age: 65
End: 2025-01-23

## 2025-01-23 VITALS
HEART RATE: 77 BPM | DIASTOLIC BLOOD PRESSURE: 79 MMHG | WEIGHT: 199 LBS | HEIGHT: 70 IN | BODY MASS INDEX: 28.49 KG/M2 | SYSTOLIC BLOOD PRESSURE: 162 MMHG | RESPIRATION RATE: 20 BRPM | TEMPERATURE: 97.2 F

## 2025-01-23 DIAGNOSIS — Z95.828 S/P TIPS (TRANSJUGULAR INTRAHEPATIC PORTOSYSTEMIC SHUNT): ICD-10-CM

## 2025-01-23 DIAGNOSIS — K70.31 ALCOHOLIC CIRRHOSIS OF LIVER WITH ASCITES (HCC): Primary | ICD-10-CM

## 2025-01-23 DIAGNOSIS — L29.9 GENERALIZED PRURITUS: ICD-10-CM

## 2025-01-23 DIAGNOSIS — K76.82 HEPATIC ENCEPHALOPATHY (HCC): ICD-10-CM

## 2025-01-23 RX ORDER — GABAPENTIN 100 MG/1
100 CAPSULE ORAL DAILY
Qty: 180 CAPSULE | Refills: 1 | Status: SHIPPED | OUTPATIENT
Start: 2025-01-23 | End: 2025-07-22

## 2025-01-23 ASSESSMENT — ENCOUNTER SYMPTOMS
VOICE CHANGE: 0
CONSTIPATION: 0
NAUSEA: 0
COUGH: 0
ABDOMINAL PAIN: 1
DIARRHEA: 1
TROUBLE SWALLOWING: 0
ABDOMINAL DISTENTION: 0
BLOOD IN STOOL: 0
RECTAL PAIN: 0
WHEEZING: 0
CHOKING: 0
BACK PAIN: 1
SORE THROAT: 0
COLOR CHANGE: 0
ANAL BLEEDING: 0
VOMITING: 0

## 2025-01-23 NOTE — PROGRESS NOTES
AM        IMPRESSION: Mr. Lisa is a 64 y.o. male with past history remarkable for cirrhosis secondary to EtOH abuse complicated by EV, recurrent ascites status post TIPS, TALA, hepatitis C treated with Harvoni, GERD, esophageal cancer treated with chemoradiation, history of bowel perforation requiring right hemicolectomy with end ileostomy reversed on 10/10/2023 , who presents for follow-up.    Assessment  1. Alcoholic cirrhosis of liver with ascites (HCC)    2. Generalized pruritus    3. S/P TIPS (transjugular intrahepatic portosystemic shunt)    4. Hepatic encephalopathy (HCC)        - Decompensated with MELD score of 11    Plan:  - Obtain MELD labs (CBC, CMP, INR)  - HCC screening:  US liver + Afp every 6 months, ordered  - Varices: Last EGD 6/2024, repeat later this year  - Ascites: Hx of recurrent ascites, now with TIPS and ascites improved  - Hepatic Encephalopathy: Continue lactulose, titrate to 2-3 bowel movements per day  - Recommend to avoid alcohol  - May need to consider referral for liver transplant evaluation however ongoing alcohol use will be a limiting factor  -Gabapentin 100 mg ordered for chronic pruritus, insomnia and neuropathy      Frank was seen today for follow-up.    Diagnoses and all orders for this visit:    Alcoholic cirrhosis of liver with ascites (HCC)  -     CBC; Future  -     Comprehensive Metabolic Panel with Bilirubin; Future  -     Protime-INR; Future  -     AFP Tumor Marker; Future  -     US LIVER; Future  -     Magnesium; Future    Generalized pruritus    S/P TIPS (transjugular intrahepatic portosystemic shunt)    Hepatic encephalopathy (HCC)    Other orders  -     gabapentin (NEURONTIN) 100 MG capsule; Take 1 capsule by mouth daily for 180 days. Intended supply: 90 days               RTC:2-3months    Additional comments:          Thank you for allowing me to participate in the care of Mr. Lisa. For any further questions please do not hesitate to contact me.      I have

## 2025-01-24 ENCOUNTER — HOSPITAL ENCOUNTER (OUTPATIENT)
Age: 65
Discharge: HOME | End: 2025-01-24
Payer: COMMERCIAL

## 2025-01-24 ENCOUNTER — TELEPHONE (OUTPATIENT)
Dept: GASTROENTEROLOGY | Age: 65
End: 2025-01-24

## 2025-01-24 DIAGNOSIS — K70.31 ALCOHOLIC CIRRHOSIS OF LIVER WITH ASCITES (HCC): ICD-10-CM

## 2025-01-24 LAB
AFP SERPL-MCNC: 2.9 UG/L
ALBUMIN SERPL-MCNC: 3.1 G/DL (ref 3.5–5.2)
ALP SERPL-CCNC: 168 U/L (ref 40–129)
ALT SERPL-CCNC: 9 U/L (ref 10–50)
ANION GAP SERPL CALCULATED.3IONS-SCNC: 8 MMOL/L (ref 9–16)
AST SERPL-CCNC: 29 U/L (ref 10–50)
BILIRUB DIRECT SERPL-MCNC: 0.9 MG/DL (ref 0–0.3)
BILIRUB INDIRECT SERPL-MCNC: 0.6 MG/DL (ref 0–1)
BILIRUB SERPL-MCNC: 1.5 MG/DL (ref 0–1.2)
BUN SERPL-MCNC: 10 MG/DL (ref 8–23)
CALCIUM SERPL-MCNC: 8.9 MG/DL (ref 8.6–10.4)
CHLORIDE SERPL-SCNC: 107 MMOL/L (ref 98–107)
CO2 SERPL-SCNC: 24 MMOL/L (ref 20–31)
CREAT SERPL-MCNC: 1 MG/DL (ref 0.7–1.2)
ERYTHROCYTE [DISTWIDTH] IN BLOOD BY AUTOMATED COUNT: 15.5 % (ref 11.5–14.9)
GFR, ESTIMATED: 84 ML/MIN/1.73M2
GLUCOSE SERPL-MCNC: 97 MG/DL (ref 74–99)
HCT VFR BLD AUTO: 25.7 % (ref 41–53)
HGB BLD-MCNC: 8.4 G/DL (ref 13.5–17.5)
INR PPP: 1.3
MAGNESIUM SERPL-MCNC: 1.6 MG/DL (ref 1.6–2.4)
MCH RBC QN AUTO: 27.6 PG (ref 26–34)
MCHC RBC AUTO-ENTMCNC: 32.6 G/DL (ref 31–37)
MCV RBC AUTO: 84.6 FL (ref 80–100)
PLATELET # BLD AUTO: 183 K/UL (ref 150–450)
PMV BLD AUTO: 7.8 FL (ref 6–12)
POTASSIUM SERPL-SCNC: 4 MMOL/L (ref 3.7–5.3)
PROT SERPL-MCNC: 5.8 G/DL (ref 6.6–8.7)
PROTHROMBIN TIME: 17.6 SEC (ref 11.8–14.6)
RBC # BLD AUTO: 3.04 M/UL (ref 4.5–5.9)
SODIUM SERPL-SCNC: 139 MMOL/L (ref 136–145)
WBC OTHER # BLD: 6.2 K/UL (ref 3.5–11)

## 2025-01-24 PROCEDURE — 83735 ASSAY OF MAGNESIUM: CPT

## 2025-01-24 PROCEDURE — 82248 BILIRUBIN DIRECT: CPT

## 2025-01-24 PROCEDURE — 80053 COMPREHEN METABOLIC PANEL: CPT

## 2025-01-24 PROCEDURE — 36415 COLL VENOUS BLD VENIPUNCTURE: CPT

## 2025-01-24 PROCEDURE — 85610 PROTHROMBIN TIME: CPT

## 2025-01-24 PROCEDURE — 82105 ALPHA-FETOPROTEIN SERUM: CPT

## 2025-01-24 PROCEDURE — 85027 COMPLETE CBC AUTOMATED: CPT

## 2025-01-24 NOTE — TELEPHONE ENCOUNTER
Patient called again regarding this rx. I did explain to him the call back response window. I did inform him that I sent another message.

## 2025-01-27 NOTE — TELEPHONE ENCOUNTER
Patient called and stated there is an issue from the pharmacy on what you prescribed and how was prescribed.  Called pharmacy and told them 90days with 1 refill. Called back patient and let him know.
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
## 2025-02-04 ENCOUNTER — PREP FOR PROCEDURE (OUTPATIENT)
Dept: ORTHOPEDIC SURGERY | Age: 65
End: 2025-02-04

## 2025-02-04 ENCOUNTER — OFFICE VISIT (OUTPATIENT)
Dept: ORTHOPEDIC SURGERY | Age: 65
End: 2025-02-04
Payer: COMMERCIAL

## 2025-02-04 VITALS — BODY MASS INDEX: 28.49 KG/M2 | RESPIRATION RATE: 14 BRPM | HEIGHT: 70 IN | WEIGHT: 199 LBS

## 2025-02-04 DIAGNOSIS — Z98.890 S/P KYPHOPLASTY: Primary | ICD-10-CM

## 2025-02-04 DIAGNOSIS — S32.030A CLOSED COMPRESSION FRACTURE OF L3 LUMBAR VERTEBRA, INITIAL ENCOUNTER (HCC): Primary | ICD-10-CM

## 2025-02-04 DIAGNOSIS — M54.50 ACUTE MIDLINE LOW BACK PAIN WITHOUT SCIATICA: ICD-10-CM

## 2025-02-04 DIAGNOSIS — S32.030A COMPRESSION FRACTURE OF L3 LUMBAR VERTEBRA, CLOSED, INITIAL ENCOUNTER (HCC): ICD-10-CM

## 2025-02-04 DIAGNOSIS — S32.030A CLOSED COMPRESSION FRACTURE OF L3 LUMBAR VERTEBRA, INITIAL ENCOUNTER (HCC): ICD-10-CM

## 2025-02-04 PROCEDURE — 99213 OFFICE O/P EST LOW 20 MIN: CPT | Performed by: ORTHOPAEDIC SURGERY

## 2025-02-04 PROCEDURE — 1123F ACP DISCUSS/DSCN MKR DOCD: CPT | Performed by: ORTHOPAEDIC SURGERY

## 2025-02-04 NOTE — PROGRESS NOTES
Patient ID: Frank Lisa is a 64 y.o. male    Chief Compliant:  No chief complaint on file.       Diagnostic imaging:        Assessment and Plan:  No diagnosis found.    12 weeks post L4 & T11 Kyphoplasty, 11/22/2024.         Follow up       HPI:  This is a 64 y.o. male who presents to the clinic today for 12 weeks post L4 & T11 Kyphoplasty, 11/22/2024.         Review of Systems   All other systems reviewed and are negative.      Past History:    Current Outpatient Medications:     gabapentin (NEURONTIN) 100 MG capsule, Take 1 capsule by mouth daily for 180 days. Intended supply: 90 days, Disp: 180 capsule, Rfl: 1    lactulose (CHRONULAC) 10 GM/15ML solution, Take 45 mLs by mouth 3 times daily, Disp: 946 mL, Rfl: 1    pantoprazole (PROTONIX) 40 MG tablet, Take 1 tablet by mouth in the morning and at bedtime, Disp: 60 tablet, Rfl: 3    mirtazapine (REMERON SOL-TAB) 15 MG disintegrating tablet, Take 1 tablet by mouth nightly, Disp: 30 tablet, Rfl: 3    atorvastatin (LIPITOR) 40 MG tablet, Take 1 tablet by mouth nightly, Disp: 30 tablet, Rfl: 3    vitamin D (ERGOCALCIFEROL) 1.25 MG (82615 UT) CAPS capsule, Take 1 capsule by mouth once a week, Disp: 12 capsule, Rfl: 1    magnesium oxide (MAG-OX) 400 MG tablet, Take 1 tablet by mouth daily, Disp: 30 tablet, Rfl: 1    sucralfate (CARAFATE) 1 GM tablet, Take 1 tablet by mouth 4 times daily (before meals and nightly), Disp: 120 tablet, Rfl: 3    Elastic Bandages & Supports (MEDICAL COMPRESSION STOCKINGS) MISC, 2 each by Does not apply route daily Right and left knee high compression stockings.  30-40 mmHg.  To be worn continuously throughout the day., Disp: 2 each, Rfl: 11    metoprolol tartrate (LOPRESSOR) 25 MG tablet, Take 0.5 tablets by mouth 2 times daily, Disp: 60 tablet, Rfl: 3    ferrous sulfate (IRON 325) 325 (65 Fe) MG tablet, Take 1 tablet by mouth in the morning and at bedtime, Disp: , Rfl:     Handicap Placard MISC, by Does not apply route Exp: 5-14-26, Disp:

## 2025-02-04 NOTE — PROGRESS NOTES
Patient ID: Frank Lisa is a 64 y.o. male    Chief Compliant:  No chief complaint on file.       Diagnostic imaging:  AP lateral lumbar spine status post T11-12 kyphoplasty for osteoporosis chronic L1 compression deformity status post kyphoplasty L2 and L4 new inferior endplate compression fracture L3 with respect to x-rays December 3      Assessment and Plan:  1. S/P kyphoplasty    2. Closed compression fracture of L3 lumbar vertebra, initial encounter ([facility])    3. Acute midline low back pain without sciatica        12 weeks post L4 & T11 Kyphoplasty, 11/22/2024.     Midline back pain radiating to the buttock    New compression fracture inferior endplate L3    L3 Kyphoplasty     Follow up 2 weeks post op    HPI:  This is a 64 y.o. male who presents to the clinic today for 12 weeks post L4 & T11 Kyphoplasty, 11/22/2024.     Patient's main complaint today is of midline back pain radiating to the buttock, onset since last visit    L3 compression fracture      Review of Systems   All other systems reviewed and are negative.      Past History:    Current Outpatient Medications:     gabapentin (NEURONTIN) 100 MG capsule, Take 1 capsule by mouth daily for 180 days. Intended supply: 90 days, Disp: 180 capsule, Rfl: 1    lactulose (CHRONULAC) 10 GM/15ML solution, Take 45 mLs by mouth 3 times daily, Disp: 946 mL, Rfl: 1    pantoprazole (PROTONIX) 40 MG tablet, Take 1 tablet by mouth in the morning and at bedtime, Disp: 60 tablet, Rfl: 3    mirtazapine (REMERON SOL-TAB) 15 MG disintegrating tablet, Take 1 tablet by mouth nightly, Disp: 30 tablet, Rfl: 3    atorvastatin (LIPITOR) 40 MG tablet, Take 1 tablet by mouth nightly, Disp: 30 tablet, Rfl: 3    vitamin D (ERGOCALCIFEROL) 1.25 MG (77750 UT) CAPS capsule, Take 1 capsule by mouth once a week, Disp: 12 capsule, Rfl: 1    magnesium oxide (MAG-OX) 400 MG tablet, Take 1 tablet by mouth daily, Disp: 30 tablet, Rfl: 1    sucralfate (CARAFATE) 1 GM tablet, Take 1 tablet by mouth

## 2025-02-05 ENCOUNTER — HOSPITAL ENCOUNTER (OUTPATIENT)
Dept: PREADMISSION TESTING | Age: 65
Setting detail: OUTPATIENT SURGERY
Discharge: HOME | End: 2025-02-09
Payer: COMMERCIAL

## 2025-02-05 ENCOUNTER — ANESTHESIA EVENT (OUTPATIENT)
Dept: OPERATING ROOM | Age: 65
End: 2025-02-05
Payer: COMMERCIAL

## 2025-02-05 VITALS — WEIGHT: 185 LBS | BODY MASS INDEX: 26.48 KG/M2 | HEIGHT: 70 IN

## 2025-02-05 NOTE — PROGRESS NOTES
Pre-op Instructions For Out-Patient Surgery    Medication Instructions:  Please stop herbs and any supplements now (includes vitamins and minerals).    For these medications:  Dulaglutide (Trulicity), Exenatide (Byetta and Bydureon, Liraglutide (Victoza), Lixisenatide (Adlyxin), Semaglutide (Ozempic and Rybelsus), Tirzepatide (Mounjaro, Zepbound)- Stop 1 week prior if taking weekly or 1 day prior if taking every 12 hours or daily.     Please contact your surgeon and prescribing physician for pre-op instructions for any blood thinners.    If you have inhalers/aerosol treatments at home, please use them the morning of your surgery and bring the inhalers with you to the hospital.    Please take the following medications the morning of your surgery with a sip of water:    metoprolol    Surgery Instructions:  After midnight before surgery:  Do not eat or drink anything, including water, mints, gum, and hard candy.  You may brush your teeth without swallowing.  No smoking, chewing tobacco, or street drugs.    Please shower or bathe before surgery.  If you were given Surgical Scrub Chlorhexidine Gluconate Liquid (CHG), please shower the night before and the morning of your surgery following the detailed instructions you received during your pre-admission visit.     Please do not wear any cologne, lotion, powder, deodorant, jewelry, piercings, perfume, makeup, nail polish, hair accessories, or hair spray on the day of surgery.  Wear loose comfortable clothing.    Leave your valuables at home but bring a payment source for any after-surgery prescriptions you plan to fill at [facility].  Bring a storage case for any glasses/contacts.    An adult who is responsible for you MUST drive you home and should be with you for the first 24 hours after surgery.     If having out-patient knee and foot surgeries, please arrange for planned crutches, walker, or wheelchair before arriving to the

## 2025-02-06 ENCOUNTER — APPOINTMENT (OUTPATIENT)
Dept: GENERAL RADIOLOGY | Age: 65
End: 2025-02-06
Attending: ORTHOPAEDIC SURGERY
Payer: COMMERCIAL

## 2025-02-06 ENCOUNTER — ANESTHESIA (OUTPATIENT)
Dept: OPERATING ROOM | Age: 65
End: 2025-02-06
Payer: COMMERCIAL

## 2025-02-06 ENCOUNTER — HOSPITAL ENCOUNTER (OUTPATIENT)
Age: 65
Setting detail: OUTPATIENT SURGERY
Discharge: HOME | End: 2025-02-06
Attending: ORTHOPAEDIC SURGERY | Admitting: ORTHOPAEDIC SURGERY
Payer: COMMERCIAL

## 2025-02-06 VITALS
DIASTOLIC BLOOD PRESSURE: 71 MMHG | RESPIRATION RATE: 12 BRPM | SYSTOLIC BLOOD PRESSURE: 165 MMHG | BODY MASS INDEX: 26.48 KG/M2 | HEART RATE: 60 BPM | WEIGHT: 185 LBS | HEIGHT: 70 IN | TEMPERATURE: 96.9 F | OXYGEN SATURATION: 98 %

## 2025-02-06 DIAGNOSIS — S32.030A COMPRESSION FRACTURE OF L3 LUMBAR VERTEBRA, CLOSED, INITIAL ENCOUNTER (HCC): Primary | ICD-10-CM

## 2025-02-06 PROBLEM — M80.00XD AGE-RELATED OSTEOPOROSIS WITH CURRENT PATHOLOGICAL FRACTURE WITH ROUTINE HEALING: Status: ACTIVE | Noted: 2024-07-24

## 2025-02-06 PROCEDURE — 7100000010 HC PHASE II RECOVERY - FIRST 15 MIN: Performed by: ORTHOPAEDIC SURGERY

## 2025-02-06 PROCEDURE — 2709999900 HC NON-CHARGEABLE SUPPLY: Performed by: ORTHOPAEDIC SURGERY

## 2025-02-06 PROCEDURE — 7100000001 HC PACU RECOVERY - ADDTL 15 MIN: Performed by: ORTHOPAEDIC SURGERY

## 2025-02-06 PROCEDURE — 3700000000 HC ANESTHESIA ATTENDED CARE: Performed by: ORTHOPAEDIC SURGERY

## 2025-02-06 PROCEDURE — 7100000000 HC PACU RECOVERY - FIRST 15 MIN: Performed by: ORTHOPAEDIC SURGERY

## 2025-02-06 PROCEDURE — 6360000002 HC RX W HCPCS: Performed by: ANESTHESIOLOGY

## 2025-02-06 PROCEDURE — 7100000031 HC ASPR PHASE II RECOVERY - ADDTL 15 MIN: Performed by: ORTHOPAEDIC SURGERY

## 2025-02-06 PROCEDURE — C1894 INTRO/SHEATH, NON-LASER: HCPCS | Performed by: ORTHOPAEDIC SURGERY

## 2025-02-06 PROCEDURE — 2580000003 HC RX 258: Performed by: ANESTHESIOLOGY

## 2025-02-06 PROCEDURE — 3700000001 HC ADD 15 MINUTES (ANESTHESIA): Performed by: ORTHOPAEDIC SURGERY

## 2025-02-06 PROCEDURE — 2500000003 HC RX 250 WO HCPCS

## 2025-02-06 PROCEDURE — 6370000000 HC RX 637 (ALT 250 FOR IP): Performed by: ANESTHESIOLOGY

## 2025-02-06 PROCEDURE — C1713 ANCHOR/SCREW BN/BN,TIS/BN: HCPCS | Performed by: ORTHOPAEDIC SURGERY

## 2025-02-06 PROCEDURE — 2500000003 HC RX 250 WO HCPCS: Performed by: ORTHOPAEDIC SURGERY

## 2025-02-06 PROCEDURE — 6360000002 HC RX W HCPCS

## 2025-02-06 PROCEDURE — 3600000002 HC SURGERY LEVEL 2 BASE: Performed by: ORTHOPAEDIC SURGERY

## 2025-02-06 PROCEDURE — 7100000011 HC PHASE II RECOVERY - ADDTL 15 MIN: Performed by: ORTHOPAEDIC SURGERY

## 2025-02-06 PROCEDURE — 3600000012 HC SURGERY LEVEL 2 ADDTL 15MIN: Performed by: ORTHOPAEDIC SURGERY

## 2025-02-06 PROCEDURE — 7100000030 HC ASPR PHASE II RECOVERY - FIRST 15 MIN: Performed by: ORTHOPAEDIC SURGERY

## 2025-02-06 PROCEDURE — 6360000002 HC RX W HCPCS: Performed by: ORTHOPAEDIC SURGERY

## 2025-02-06 DEVICE — CEMENT C01A KYPHX HV-R BONE CEMENT EN
Type: IMPLANTABLE DEVICE | Site: SPINE LUMBAR | Status: FUNCTIONAL
Brand: KYPHON® HV-R® BONE CEMENT

## 2025-02-06 RX ORDER — DIPHENHYDRAMINE HYDROCHLORIDE 50 MG/ML
12.5 INJECTION INTRAMUSCULAR; INTRAVENOUS
Status: COMPLETED | OUTPATIENT
Start: 2025-02-06 | End: 2025-02-06

## 2025-02-06 RX ORDER — GABAPENTIN 100 MG/1
100 CAPSULE ORAL ONCE
Status: COMPLETED | OUTPATIENT
Start: 2025-02-06 | End: 2025-02-06

## 2025-02-06 RX ORDER — PROPOFOL 10 MG/ML
INJECTION, EMULSION INTRAVENOUS
Status: DISCONTINUED | OUTPATIENT
Start: 2025-02-06 | End: 2025-02-06 | Stop reason: SDUPTHER

## 2025-02-06 RX ORDER — SODIUM CHLORIDE 9 MG/ML
INJECTION, SOLUTION INTRAVENOUS PRN
Status: DISCONTINUED | OUTPATIENT
Start: 2025-02-06 | End: 2025-02-06 | Stop reason: HOSPADM

## 2025-02-06 RX ORDER — SODIUM CHLORIDE 0.9 % (FLUSH) 0.9 %
5-40 SYRINGE (ML) INJECTION EVERY 12 HOURS SCHEDULED
Status: DISCONTINUED | OUTPATIENT
Start: 2025-02-06 | End: 2025-02-06 | Stop reason: HOSPADM

## 2025-02-06 RX ORDER — ONDANSETRON 2 MG/ML
INJECTION INTRAMUSCULAR; INTRAVENOUS
Status: DISCONTINUED | OUTPATIENT
Start: 2025-02-06 | End: 2025-02-06 | Stop reason: SDUPTHER

## 2025-02-06 RX ORDER — SODIUM CHLORIDE 9 MG/ML
INJECTION, SOLUTION INTRAVENOUS CONTINUOUS
Status: DISCONTINUED | OUTPATIENT
Start: 2025-02-06 | End: 2025-02-06 | Stop reason: HOSPADM

## 2025-02-06 RX ORDER — LIDOCAINE HYDROCHLORIDE 10 MG/ML
1 INJECTION, SOLUTION EPIDURAL; INFILTRATION; INTRACAUDAL; PERINEURAL
Status: COMPLETED | OUTPATIENT
Start: 2025-02-06 | End: 2025-02-06

## 2025-02-06 RX ORDER — SODIUM CHLORIDE 0.9 % (FLUSH) 0.9 %
5-40 SYRINGE (ML) INJECTION PRN
Status: DISCONTINUED | OUTPATIENT
Start: 2025-02-06 | End: 2025-02-06 | Stop reason: HOSPADM

## 2025-02-06 RX ORDER — HYDROCODONE BITARTRATE AND ACETAMINOPHEN 5; 325 MG/1; MG/1
1 TABLET ORAL EVERY 4 HOURS PRN
Qty: 18 TABLET | Refills: 0 | Status: SHIPPED | OUTPATIENT
Start: 2025-02-06 | End: 2025-02-09

## 2025-02-06 RX ORDER — ROCURONIUM BROMIDE 10 MG/ML
INJECTION, SOLUTION INTRAVENOUS
Status: DISCONTINUED | OUTPATIENT
Start: 2025-02-06 | End: 2025-02-06 | Stop reason: SDUPTHER

## 2025-02-06 RX ORDER — HYDRALAZINE HYDROCHLORIDE 20 MG/ML
10 INJECTION INTRAMUSCULAR; INTRAVENOUS
Status: DISCONTINUED | OUTPATIENT
Start: 2025-02-06 | End: 2025-02-06 | Stop reason: HOSPADM

## 2025-02-06 RX ORDER — BUPIVACAINE HYDROCHLORIDE AND EPINEPHRINE 2.5; 5 MG/ML; UG/ML
INJECTION, SOLUTION EPIDURAL; INFILTRATION; INTRACAUDAL; PERINEURAL PRN
Status: DISCONTINUED | OUTPATIENT
Start: 2025-02-06 | End: 2025-02-06 | Stop reason: ALTCHOICE

## 2025-02-06 RX ORDER — HYDROCODONE BITARTRATE AND ACETAMINOPHEN 5; 325 MG/1; MG/1
1 TABLET ORAL EVERY 6 HOURS PRN
Status: COMPLETED | OUTPATIENT
Start: 2025-02-06 | End: 2025-02-06

## 2025-02-06 RX ORDER — NALOXONE HYDROCHLORIDE 0.4 MG/ML
INJECTION, SOLUTION INTRAMUSCULAR; INTRAVENOUS; SUBCUTANEOUS PRN
Status: DISCONTINUED | OUTPATIENT
Start: 2025-02-06 | End: 2025-02-06 | Stop reason: HOSPADM

## 2025-02-06 RX ORDER — FENTANYL CITRATE 0.05 MG/ML
25 INJECTION, SOLUTION INTRAMUSCULAR; INTRAVENOUS EVERY 5 MIN PRN
Status: DISCONTINUED | OUTPATIENT
Start: 2025-02-06 | End: 2025-02-06 | Stop reason: HOSPADM

## 2025-02-06 RX ORDER — ONDANSETRON 2 MG/ML
4 INJECTION INTRAMUSCULAR; INTRAVENOUS
Status: COMPLETED | OUTPATIENT
Start: 2025-02-06 | End: 2025-02-06

## 2025-02-06 RX ORDER — LABETALOL HYDROCHLORIDE 5 MG/ML
10 INJECTION, SOLUTION INTRAVENOUS
Status: DISCONTINUED | OUTPATIENT
Start: 2025-02-06 | End: 2025-02-06 | Stop reason: HOSPADM

## 2025-02-06 RX ORDER — LIDOCAINE HYDROCHLORIDE 20 MG/ML
INJECTION, SOLUTION EPIDURAL; INFILTRATION; INTRACAUDAL; PERINEURAL
Status: DISCONTINUED | OUTPATIENT
Start: 2025-02-06 | End: 2025-02-06 | Stop reason: SDUPTHER

## 2025-02-06 RX ORDER — DEXAMETHASONE SODIUM PHOSPHATE 4 MG/ML
INJECTION, SOLUTION INTRA-ARTICULAR; INTRALESIONAL; INTRAMUSCULAR; INTRAVENOUS; SOFT TISSUE
Status: DISCONTINUED | OUTPATIENT
Start: 2025-02-06 | End: 2025-02-06 | Stop reason: SDUPTHER

## 2025-02-06 RX ORDER — METOCLOPRAMIDE HYDROCHLORIDE 5 MG/ML
10 INJECTION INTRAMUSCULAR; INTRAVENOUS
Status: DISCONTINUED | OUTPATIENT
Start: 2025-02-06 | End: 2025-02-06 | Stop reason: HOSPADM

## 2025-02-06 RX ORDER — FENTANYL CITRATE 50 UG/ML
INJECTION, SOLUTION INTRAMUSCULAR; INTRAVENOUS
Status: DISCONTINUED | OUTPATIENT
Start: 2025-02-06 | End: 2025-02-06 | Stop reason: SDUPTHER

## 2025-02-06 RX ADMIN — SUGAMMADEX 100 MG: 100 INJECTION, SOLUTION INTRAVENOUS at 15:15

## 2025-02-06 RX ADMIN — ROCURONIUM BROMIDE 50 MG: 10 INJECTION, SOLUTION INTRAVENOUS at 14:39

## 2025-02-06 RX ADMIN — FENTANYL CITRATE 50 MCG: 50 INJECTION INTRAMUSCULAR; INTRAVENOUS at 15:02

## 2025-02-06 RX ADMIN — HYDROMORPHONE HYDROCHLORIDE 0.5 MG: 1 INJECTION, SOLUTION INTRAMUSCULAR; INTRAVENOUS; SUBCUTANEOUS at 15:41

## 2025-02-06 RX ADMIN — LIDOCAINE HYDROCHLORIDE 80 MG: 20 INJECTION, SOLUTION EPIDURAL; INFILTRATION; INTRACAUDAL; PERINEURAL at 14:37

## 2025-02-06 RX ADMIN — GABAPENTIN 100 MG: 100 CAPSULE ORAL at 13:08

## 2025-02-06 RX ADMIN — Medication 2000 MG: at 14:49

## 2025-02-06 RX ADMIN — DIPHENHYDRAMINE HYDROCHLORIDE 12.5 MG: 50 INJECTION INTRAMUSCULAR; INTRAVENOUS at 15:51

## 2025-02-06 RX ADMIN — DEXAMETHASONE SODIUM PHOSPHATE 4 MG: 4 INJECTION, SOLUTION INTRA-ARTICULAR; INTRALESIONAL; INTRAMUSCULAR; INTRAVENOUS; SOFT TISSUE at 14:59

## 2025-02-06 RX ADMIN — SUGAMMADEX 100 MG: 100 INJECTION, SOLUTION INTRAVENOUS at 15:11

## 2025-02-06 RX ADMIN — HYDROCODONE BITARTRATE AND ACETAMINOPHEN 1 TABLET: 5; 325 TABLET ORAL at 16:29

## 2025-02-06 RX ADMIN — PROPOFOL 200 MG: 10 INJECTION, EMULSION INTRAVENOUS at 14:39

## 2025-02-06 RX ADMIN — ONDANSETRON 4 MG: 2 INJECTION, SOLUTION INTRAMUSCULAR; INTRAVENOUS at 15:43

## 2025-02-06 RX ADMIN — ONDANSETRON 4 MG: 2 INJECTION, SOLUTION INTRAMUSCULAR; INTRAVENOUS at 15:02

## 2025-02-06 RX ADMIN — LIDOCAINE HYDROCHLORIDE 1 ML: 10 INJECTION, SOLUTION EPIDURAL; INFILTRATION; INTRACAUDAL; PERINEURAL at 12:57

## 2025-02-06 RX ADMIN — FENTANYL CITRATE 25 MCG: 0.05 INJECTION, SOLUTION INTRAMUSCULAR; INTRAVENOUS at 15:47

## 2025-02-06 RX ADMIN — FENTANYL CITRATE 50 MCG: 50 INJECTION INTRAMUSCULAR; INTRAVENOUS at 14:37

## 2025-02-06 RX ADMIN — SODIUM CHLORIDE: 9 INJECTION, SOLUTION INTRAVENOUS at 12:57

## 2025-02-06 ASSESSMENT — ENCOUNTER SYMPTOMS
NAUSEA: 0
ABDOMINAL PAIN: 0
SHORTNESS OF BREATH: 1
SINUS PRESSURE: 0
SHORTNESS OF BREATH: 0

## 2025-02-06 ASSESSMENT — PAIN SCALES - GENERAL
PAINLEVEL_OUTOF10: 10
PAINLEVEL_OUTOF10: 6
PAINLEVEL_OUTOF10: 8

## 2025-02-06 ASSESSMENT — PAIN DESCRIPTION - DESCRIPTORS
DESCRIPTORS: ACHING
DESCRIPTORS: ACHING
DESCRIPTORS: SHARP
DESCRIPTORS: SHARP

## 2025-02-06 ASSESSMENT — PAIN DESCRIPTION - LOCATION
LOCATION: BACK
LOCATION: INCISION;BACK
LOCATION: INCISION;BACK

## 2025-02-06 ASSESSMENT — PAIN DESCRIPTION - PAIN TYPE
TYPE: SURGICAL PAIN
TYPE: SURGICAL PAIN

## 2025-02-06 ASSESSMENT — PAIN DESCRIPTION - ORIENTATION
ORIENTATION: LOWER
ORIENTATION: LOWER

## 2025-02-06 NOTE — BRIEF OP NOTE
Brief Postoperative Note      Patient: Frank Lisa  YOB: 1960  MRN: [id number]    Date of Procedure: 2/6/2025    Pre-Op Diagnosis Codes:      * Compression fracture of L3 lumbar vertebra, closed, initial encounter ([facility]) [S32.030A]  Osteoporosis  Post-Op Diagnosis: Same       Procedure(s):  KYPHOPLASTY L3    Surgeon(s):  Júnior Cueva MD    Assistant:  * No surgical staff found *    Anesthesia: General    Estimated Blood Loss (mL): Minimal    Complications: None    Specimens:   * No specimens in log *    Implants:  Implant Name Type Inv. Item Serial No.  Lot No. LRB No. Used Action   CEMENT BNE HI VISC RADIOPAQUE KYPHON HV-R - [id number]  CEMENT BNE HI VISC RADIOPAQUE KYPHON HV-R  [employer] SOFAMOR DANEK-WD [id number] N/A 1 Implanted         Drains: * No LDAs found *    Findings:  Infection Present At Time Of Surgery (PATOS) (choose all levels that have infection present):  No infection present  Other Findings: see dictation    Electronically signed by Júnior Cueva MD on 2/6/2025 at 3:18 PM

## 2025-02-06 NOTE — H&P
region with neurogenic claudication 12/14/2021    Severe comorbid illness 11/30/2021    Multifactorial gait disorder 11/30/2021    Current smoker 04/05/2021    COVID-19 02/23/2021    Anxiety 02/23/2021    Malignant neoplasm of lower third of esophagus (HCC)     Hypocalcemia 12/26/2020    Hypophosphatemia 12/26/2020    Alcohol abuse     Altered mental status     Thrombocytopenia (HCC) 12/23/2020    Hepatitis C virus infection resolved after antiviral drug therapy 12/23/2020    Cervical facet syndrome 11/23/2020    Lumbar facet arthropathy 08/17/2020    Elevated LFTs 08/12/2020    Seasonal allergies 08/12/2020    S/P epidural steroid injection 08/05/2020    Essential hypertension 04/24/2019    Recurrent major depressive disorder in partial remission (HCC) 04/24/2019    Pure hypercholesterolemia 02/04/2019    Hypokalemia 02/04/2019    Vitamin D deficiency 09/20/2017    History of hepatitis C 09/11/2017    Ganglion cyst 05/31/2017    Carpal tunnel syndrome of right wrist 05/31/2017    Tinnitus 03/23/2017    Eustachian tube dysfunction 03/23/2017    Lumbar disc herniation 11/08/2016    Gynecomastia, male 10/26/2016    Depression 10/13/2016    Vertebrogenic low back pain 10/06/2016    DDD (degenerative disc disease), lumbar 10/06/2016    Decompensation of cirrhosis of liver (HCC) 09/15/2016    Psychophysiologic insomnia 09/14/2016    Cirrhosis (HCC)     Hep C w/o coma, chronic (HCC)     Fatty liver     Calculus of gallbladder without cholecystitis 08/10/2016    Chronic viral hepatitis B without delta agent and without coma (HCC) 07/22/2016    Hypomagnesemia     Cervical radicular pain 01/04/2016    Tubular adenoma of colon 01/01/2016    History of colon polyps 01/01/2016    Back pain, chronic 04/19/2012    Hearing difficulty 04/19/2012    GERD (gastroesophageal reflux disease) 04/19/2012           ANITA REES, APRN - CNP on 2/6/2025 at 12:15 PM

## 2025-02-06 NOTE — ANESTHESIA PRE PROCEDURE
Department of Anesthesiology  Preprocedure Note       Name:  Frank Lisa   Age:  64 y.o.  :  1960                                          MRN:  [id number]         Date:  2025      Surgeon: Surgeon(s):  Júnior Cueva MD    Procedure: Procedure(s):  KYPHOPLASTY L3    Medications prior to admission:   Prior to Admission medications    Medication Sig Start Date End Date Taking? Authorizing Provider   gabapentin (NEURONTIN) 100 MG capsule Take 1 capsule by mouth daily for 180 days. Intended supply: 90 days 25 Yes Dixon Olivia MD   lactulose (CHRONULAC) 10 GM/15ML solution Take 45 mLs by mouth 3 times daily 24  Yes Armen Adkins MD   pantoprazole (PROTONIX) 40 MG tablet Take 1 tablet by mouth in the morning and at bedtime 24  Yes Armen Adkins MD   mirtazapine (REMERON SOL-TAB) 15 MG disintegrating tablet Take 1 tablet by mouth nightly 24  Yes Kathi Russo MD   atorvastatin (LIPITOR) 40 MG tablet Take 1 tablet by mouth nightly 10/6/24  Yes Jaziel Cerda MD   magnesium oxide (MAG-OX) 400 MG tablet Take 1 tablet by mouth daily 24  Yes Lopez Rosario PA   sucralfate (CARAFATE) 1 GM tablet Take 1 tablet by mouth 4 times daily (before meals and nightly) 24  Yes Jessika Abad MD   metoprolol tartrate (LOPRESSOR) 25 MG tablet Take 0.5 tablets by mouth 2 times daily 24  Yes Jessika Abad MD   ferrous sulfate (IRON 325) 325 (65 Fe) MG tablet Take 1 tablet by mouth in the morning and at bedtime   Yes Dania Peoples MD   folic acid (FOLVITE) 1 MG tablet Take 1 tablet by mouth daily 5/3/24  Yes Roberto Workman MD   DULoxetine (CYMBALTA) 30 MG extended release capsule Take 1 capsule by mouth daily 24  Yes Hamdia Loaiza MD   vitamin D (ERGOCALCIFEROL) 1.25 MG (82942 UT) CAPS capsule Take 1 capsule by mouth once a week 10/6/24   Jaziel Cerda MD   Elastic Bandages & Supports (MEDICAL COMPRESSION STOCKINGS) MISC 2 each by Does not

## 2025-02-06 NOTE — ANESTHESIA POSTPROCEDURE EVALUATION
Department of Anesthesiology  Postprocedure Note    Patient: Frank Lisa  MRN: [id number]  YOB: 1960  Date of evaluation: 2/6/2025    Procedure Summary       Date: 02/06/25 Room / Location: [address]    Anesthesia Start: 1434 Anesthesia Stop: 1528    Procedure: KYPHOPLASTY L3 (Back) Diagnosis:       Compression fracture of L3 lumbar vertebra, closed, initial encounter (HCC)      (Compression fracture of L3 lumbar vertebra, closed, initial encounter ([facility]) [S32.030A])    Surgeons: Júnior Cueva MD Responsible Provider: Kyle Sanders MD    Anesthesia Type: general ASA Status: 3            Anesthesia Type: No value filed.    Yen Phase I: Yen Score: 9    Yen Phase II: Yen Score: 10    Anesthesia Post Evaluation    Patient location during evaluation: PACU  Patient participation: complete - patient participated  Level of consciousness: awake and alert  Airway patency: patent  Nausea & Vomiting: no vomiting  Cardiovascular status: hemodynamically stable  Respiratory status: acceptable  Hydration status: euvolemic  Comments: POST- ANESTHESIA EVALUATION       Pt Name: Frank Lisa  MRN: [id number]  YOB: 1960  Date of evaluation: 2/6/2025  Time:  5:14 PM      BP (!) 165/71   Pulse 60   Temp 96.9 °F (36.1 °C) (Infrared)   Resp 12   Ht 1.778 m (5' 10\")   Wt 83.9 kg (185 lb)   SpO2 98%   BMI 26.54 kg/m²      Consciousness Level  Awake  Cardiopulmonary Status  Stable  Pain Adequately Treated YES  Nausea / Vomiting  NO  Adequate Hydration  YES  Anesthesia Related Complications NONE      Electronically signed by Kyle Sanders MD on 2/6/2025 at 5:14 PM         Pain management: satisfactory to patient    No notable events documented.

## 2025-02-07 NOTE — OP NOTE
[facility]                [address] [place] [address]                            OPERATIVE REPORT      PATIENT NAME: ALHAJI COOLEY              : 1960  MED REC NO: [id number]                          ROOM: [facility]  ACCOUNT NO: [id number]                       ADMIT DATE: 2025  PROVIDER: Júnior Cueva MD      DATE OF PROCEDURE:  2025    SURGEON:  Júnior Cueva MD    PREOPERATIVE DIAGNOSIS:  Osteoporotic compression fracture, L3.    POSTOPERATIVE DIAGNOSIS:  Osteoporotic compression fracture, L3.    PROCEDURE PERFORMED:  Kyphoplasty of L3 with fluoroscopic assistance.    ASSISTANT:  None.    ANESTHESIA:  General.    ESTIMATED BLOOD LOSS:  Minimal.    COMPLICATIONS:  None.    SPECIMEN:  None.    IMPLANTS:  Kyphon HV-R cement.    DRAINS:  None.    FINDINGS:    1. The patient with transitional anatomy making the level numbering somewhat arbitrary.  2. Fracture level identified by previous films, evidence of kyphoplasty above and below and new fracture deformity.  3. Excellent cement fill interdigitation a little bit unusual and it did not fill as much into the superomedial portion of the vertebral body as I would have anticipated.  4. The patient with some depression of the inferior endplate compression deformity with the balloon tamponade.  5. Excellent cement fill in the inferior aspect of the vertebral body where the fracture actually was with enough interdigitation into the superior portion, particularly laterally to offer very acceptable stability.    PROCEDURE IN DETAIL:  The patient was taken to the operating room, placed under general anesthesia, transferred to the Brant table, checked for padding and positioning.  Back was prepped and draped in usual sterile fashion.    An 18-gauge spinal needle was advanced from the skin to the pedicle docking position. With the aid of fluoroscopic assistance, back injected with local anesthetic.  The

## 2025-02-18 ENCOUNTER — OFFICE VISIT (OUTPATIENT)
Dept: ORTHOPEDIC SURGERY | Age: 65
End: 2025-02-18
Payer: COMMERCIAL

## 2025-02-18 VITALS — BODY MASS INDEX: 26.48 KG/M2 | HEIGHT: 70 IN | WEIGHT: 185 LBS | RESPIRATION RATE: 14 BRPM

## 2025-02-18 DIAGNOSIS — Z98.890 S/P KYPHOPLASTY: Primary | ICD-10-CM

## 2025-02-18 DIAGNOSIS — S32.030A CLOSED COMPRESSION FRACTURE OF L3 LUMBAR VERTEBRA, INITIAL ENCOUNTER (HCC): ICD-10-CM

## 2025-02-18 PROCEDURE — 99213 OFFICE O/P EST LOW 20 MIN: CPT | Performed by: ORTHOPAEDIC SURGERY

## 2025-02-18 PROCEDURE — 1123F ACP DISCUSS/DSCN MKR DOCD: CPT | Performed by: ORTHOPAEDIC SURGERY

## 2025-02-18 NOTE — PROGRESS NOTES
EGD DIAGNOSTIC ONLY performed by Yo Santos MD at [facility] Endoscopy    UPPER GASTROINTESTINAL ENDOSCOPY N/A 08/31/2021    EGD BIOPSY performed by Augusto Cordova MD at [facility] ENDO    UPPER GASTROINTESTINAL ENDOSCOPY N/A 01/21/2022    EGD BIOPSY performed by Augusto Cordova MD at [facility] ENDO    UPPER GASTROINTESTINAL ENDOSCOPY N/A 04/15/2022    EGD ESOPHAGOGASTRODUODENOSCOPY performed by Lucian Harley MD at [facility] OR    UPPER GASTROINTESTINAL ENDOSCOPY N/A 06/06/2022    EGD BAND LIGATION performed by Bi Dawkins MD at [facility] ENDO    UPPER GASTROINTESTINAL ENDOSCOPY N/A 06/09/2022    EGD ESOPHAGOGASTRODUODENOSCOPY performed by Bi Dawkins MD at [facility] ENDO    UPPER GASTROINTESTINAL ENDOSCOPY N/A 09/14/2022    EGD ESOPHAGOGASTRODUODENOSCOPY ENDOSCOPIC APC AT GE JUNCTION performed by Jose Mckenzie MD at [facility]    UPPER GASTROINTESTINAL ENDOSCOPY N/A 10/19/2022    EGD BIOPSY performed by Augusto Cordova MD at [facility] ENDO    UPPER GASTROINTESTINAL ENDOSCOPY N/A 10/31/2022    EGD with APC performed by Augusto Cordova MD at [facility] ENDO    UPPER GASTROINTESTINAL ENDOSCOPY  12/29/2022    EGD ESOPHAGOGASTRODUODENOSCOPY performed by Yo Santos MD at [facility] OR    UPPER GASTROINTESTINAL ENDOSCOPY N/A 01/03/2023    EGD ESOPHAGOGASTRODUODENOSCOPY performed by Arsen Russo MD at [facility] OR    UPPER GASTROINTESTINAL ENDOSCOPY N/A 01/26/2023    EGD CONTROL HEMORRHAGE performed by Augusto Cordova MD at [facility] ENDO    UPPER GASTROINTESTINAL ENDOSCOPY N/A 02/13/2023    EGD WITH APC GOLD PROBE performed by Augusto Cordova MD at [facility] ENDO    UPPER GASTROINTESTINAL ENDOSCOPY N/A 03/17/2023    EGD WITH RESOLUTION CLIP APPLICATION X3 performed by Lucian Harley MD at [facility]    UPPER GASTROINTESTINAL ENDOSCOPY N/A 08/02/2023    EGD WITH APC TO SMALL BOWEL AVM AND RESOLUTION CLIP APPLIED TO GE JUNCTION performed by Dixon Olivia MD at [facility] ENDO    UPPER GASTROINTESTINAL ENDOSCOPY N/A 01/09/2024    EGD BIOPSY

## 2025-02-24 RX ORDER — LACTULOSE 10 G/15ML
30 SOLUTION ORAL 3 TIMES DAILY
Qty: 946 ML | Refills: 0 | Status: SHIPPED | OUTPATIENT
Start: 2025-02-24

## 2025-03-18 ENCOUNTER — OFFICE VISIT (OUTPATIENT)
Dept: ORTHOPEDIC SURGERY | Age: 65
End: 2025-03-18

## 2025-03-18 VITALS — BODY MASS INDEX: 26.48 KG/M2 | RESPIRATION RATE: 14 BRPM | HEIGHT: 70 IN | WEIGHT: 185 LBS

## 2025-03-18 DIAGNOSIS — I72.8 SPLENIC ARTERY ANEURYSM: ICD-10-CM

## 2025-03-18 DIAGNOSIS — Z98.890 S/P KYPHOPLASTY: Primary | ICD-10-CM

## 2025-03-18 NOTE — PROGRESS NOTES
Patient ID: Frank Lisa is a 64 y.o. male    Chief Compliant:  No chief complaint on file.       Diagnostic imaging:  AP lateral lumbar spine status post kyphoplasty T10-11 L1-3 with a chronic T12 compression deformity no new fractures      Assessment and Plan:  1. S/P kyphoplasty        6 weeks post L-3 Kypho 2/6/24.    Improved and tolerable soreness in the back    Improved numbness and tingling in the hands and fingers patient reports that this is actually improved somewhat    Overall he is doing good    Follow up 6 months    HPI:  This is a 64 y.o. male who presents to the clinic today for 6 weeks post L-3 Kypho 2/6/24 . .     He notes soreness in his back, but it is tolerable and improving.    Numbness and tingling in the hands and fingers occurs mostly at night but is mostly resolved.    Review of Systems   All other systems reviewed and are negative.      Past History:    Current Outpatient Medications:     lactulose (CHRONULAC) 10 GM/15ML solution, Take 45 mLs by mouth 3 times daily, Disp: 946 mL, Rfl: 0    gabapentin (NEURONTIN) 100 MG capsule, Take 1 capsule by mouth daily for 180 days. Intended supply: 90 days, Disp: 180 capsule, Rfl: 1    pantoprazole (PROTONIX) 40 MG tablet, Take 1 tablet by mouth in the morning and at bedtime, Disp: 60 tablet, Rfl: 3    mirtazapine (REMERON SOL-TAB) 15 MG disintegrating tablet, Take 1 tablet by mouth nightly, Disp: 30 tablet, Rfl: 3    atorvastatin (LIPITOR) 40 MG tablet, Take 1 tablet by mouth nightly, Disp: 30 tablet, Rfl: 3    vitamin D (ERGOCALCIFEROL) 1.25 MG (92648 UT) CAPS capsule, Take 1 capsule by mouth once a week, Disp: 12 capsule, Rfl: 1    magnesium oxide (MAG-OX) 400 MG tablet, Take 1 tablet by mouth daily, Disp: 30 tablet, Rfl: 1    sucralfate (CARAFATE) 1 GM tablet, Take 1 tablet by mouth 4 times daily (before meals and nightly), Disp: 120 tablet, Rfl: 3    Elastic Bandages & Supports (MEDICAL COMPRESSION STOCKINGS) MISC, 2 each by Does not apply

## 2025-03-21 ENCOUNTER — TELEPHONE (OUTPATIENT)
Dept: GASTROENTEROLOGY | Age: 65
End: 2025-03-21

## 2025-03-21 RX ORDER — GABAPENTIN 100 MG/1
100 CAPSULE ORAL DAILY
Qty: 180 CAPSULE | Refills: 1 | Status: SHIPPED | OUTPATIENT
Start: 2025-03-21 | End: 2025-09-17

## 2025-03-21 NOTE — TELEPHONE ENCOUNTER
Patient lvm stating that he will be going to [place] for 2 weeks next week. Would like Gabapentin reordered ASAP

## 2025-03-26 DIAGNOSIS — L29.9 GENERALIZED PRURITUS: Primary | ICD-10-CM

## 2025-03-26 RX ORDER — GABAPENTIN 100 MG/1
100 CAPSULE ORAL 2 TIMES DAILY
Qty: 180 CAPSULE | Refills: 1 | Status: SHIPPED | OUTPATIENT
Start: 2025-03-26 | End: 2025-09-22

## 2025-03-28 ENCOUNTER — HOSPITAL ENCOUNTER (OUTPATIENT)
Dept: CT IMAGING | Age: 65
Discharge: HOME | End: 2025-03-30
Attending: SURGERY
Payer: COMMERCIAL

## 2025-03-28 ENCOUNTER — HOSPITAL ENCOUNTER (OUTPATIENT)
Dept: ULTRASOUND IMAGING | Age: 65
Discharge: HOME | End: 2025-03-30
Attending: INTERNAL MEDICINE
Payer: COMMERCIAL

## 2025-03-28 DIAGNOSIS — K70.31 ALCOHOLIC CIRRHOSIS OF LIVER WITH ASCITES (HCC): ICD-10-CM

## 2025-03-28 LAB
EGFR, POC: 84 ML/MIN/1.73M2
POC CREATININE: 1 MG/DL (ref 0.51–1.19)

## 2025-03-28 PROCEDURE — 2500000003 HC RX 250 WO HCPCS: Performed by: SURGERY

## 2025-03-28 PROCEDURE — 2580000003 HC RX 258: Performed by: SURGERY

## 2025-03-28 PROCEDURE — 76705 ECHO EXAM OF ABDOMEN: CPT

## 2025-03-28 PROCEDURE — 6360000004 HC RX CONTRAST MEDICATION: Performed by: SURGERY

## 2025-03-28 PROCEDURE — 74174 CTA ABD&PLVS W/CONTRAST: CPT

## 2025-03-28 PROCEDURE — 82565 ASSAY OF CREATININE: CPT

## 2025-03-28 RX ORDER — IOPAMIDOL 755 MG/ML
100 INJECTION, SOLUTION INTRAVASCULAR
Status: COMPLETED | OUTPATIENT
Start: 2025-03-28 | End: 2025-03-28

## 2025-03-28 RX ORDER — 0.9 % SODIUM CHLORIDE 0.9 %
100 INTRAVENOUS SOLUTION INTRAVENOUS ONCE
Status: COMPLETED | OUTPATIENT
Start: 2025-03-28 | End: 2025-03-28

## 2025-03-28 RX ORDER — SODIUM CHLORIDE 0.9 % (FLUSH) 0.9 %
10 SYRINGE (ML) INJECTION PRN
Status: DISCONTINUED | OUTPATIENT
Start: 2025-03-28 | End: 2025-03-31 | Stop reason: HOSPADM

## 2025-03-28 RX ADMIN — IOPAMIDOL 100 ML: 755 INJECTION, SOLUTION INTRAVENOUS at 09:57

## 2025-03-28 RX ADMIN — SODIUM CHLORIDE, PRESERVATIVE FREE 10 ML: 5 INJECTION INTRAVENOUS at 09:58

## 2025-03-28 RX ADMIN — SODIUM CHLORIDE 100 ML: 9 INJECTION, SOLUTION INTRAVENOUS at 09:58

## 2025-04-10 RX ORDER — FOLIC ACID 1 MG/1
1000 TABLET ORAL DAILY
Qty: 30 TABLET | Refills: 0 | Status: SHIPPED | OUTPATIENT
Start: 2025-04-10

## 2025-04-14 ENCOUNTER — OFFICE VISIT (OUTPATIENT)
Dept: VASCULAR SURGERY | Age: 65
End: 2025-04-14
Payer: COMMERCIAL

## 2025-04-14 VITALS
WEIGHT: 185 LBS | HEART RATE: 80 BPM | SYSTOLIC BLOOD PRESSURE: 159 MMHG | DIASTOLIC BLOOD PRESSURE: 83 MMHG | BODY MASS INDEX: 26.48 KG/M2 | OXYGEN SATURATION: 95 % | HEIGHT: 70 IN | RESPIRATION RATE: 16 BRPM

## 2025-04-14 DIAGNOSIS — I72.8 SPLENIC ARTERY ANEURYSM: Primary | ICD-10-CM

## 2025-04-14 PROCEDURE — 3077F SYST BP >= 140 MM HG: CPT | Performed by: SURGERY

## 2025-04-14 PROCEDURE — 3079F DIAST BP 80-89 MM HG: CPT | Performed by: SURGERY

## 2025-04-14 PROCEDURE — 99213 OFFICE O/P EST LOW 20 MIN: CPT | Performed by: SURGERY

## 2025-04-14 PROCEDURE — 1123F ACP DISCUSS/DSCN MKR DOCD: CPT | Performed by: SURGERY

## 2025-04-14 NOTE — PROGRESS NOTES
[facility]  [address] [facility] [address]  Dept: [phone]     Patient: Frank Lisa  : 1960  MRN: [id number]  DOS: 2025            HPI:  Frank Lisa is a 64 y.o. male who returns to the office regarding his splenic artery aneurysm.  I reinvestigated all of his CT examinations starting from .  At that time there was contrast filling the aneurysm measuring 24 x 30 mm.  The latest CT examination which is a CTA does not show contrast filling the aneurysm and the aneurysm still measures 24 x 30.  I do not think that the aneurysm is changing in size and I still am not enthusiastic about offering him repair.  The splenic artery is extremely torturous.  He no longer has ascites but does have varicosities due to portal hypertension.  He has a TIPS in place.    Review of Systems    Vitals:    25 1337   BP: (!) 159/83   BP Site: Right Upper Arm   Patient Position: Sitting   BP Cuff Size: Medium Adult   Pulse: 80   Resp: 16   SpO2: 95%   Weight: 83.9 kg (185 lb)   Height: 1.778 m (5' 10\")          Physical Exam  On examination his abdomen is soft nontender nondistended with normal bowel sounds and no masses.  His feet are warm and well-perfused without ulceration or gangrene.    Assessment:  1. Splenic artery aneurysm          Plan:  We will continue to follow him at yearly intervals with CTA of the abdomen and pelvis to investigate his aneurysm.  If it continues to remain the same size and has no contrast opacification I think I will watch and wait.  The implications of splenic infarction and possible infection in the patient with abdominal varices due to portal hypertension are quite significant and I would rather not get into a situation where he would need operative therapy to remove his spleen.  This would be unusual however possible.  His aneurysm has not increased in size and therefore we will

## 2025-05-01 ENCOUNTER — OFFICE VISIT (OUTPATIENT)
Dept: GASTROENTEROLOGY | Age: 65
End: 2025-05-01
Payer: COMMERCIAL

## 2025-05-01 ENCOUNTER — TELEPHONE (OUTPATIENT)
Dept: GASTROENTEROLOGY | Age: 65
End: 2025-05-01

## 2025-05-01 ENCOUNTER — PREP FOR PROCEDURE (OUTPATIENT)
Dept: GASTROENTEROLOGY | Age: 65
End: 2025-05-01

## 2025-05-01 VITALS
HEIGHT: 70 IN | WEIGHT: 199 LBS | BODY MASS INDEX: 28.49 KG/M2 | RESPIRATION RATE: 20 BRPM | TEMPERATURE: 97.3 F | HEART RATE: 77 BPM | OXYGEN SATURATION: 99 % | DIASTOLIC BLOOD PRESSURE: 81 MMHG | SYSTOLIC BLOOD PRESSURE: 147 MMHG

## 2025-05-01 DIAGNOSIS — K22.711 BARRETT'S ESOPHAGUS WITH HIGH GRADE DYSPLASIA: ICD-10-CM

## 2025-05-01 DIAGNOSIS — K76.82 HEPATIC ENCEPHALOPATHY (HCC): ICD-10-CM

## 2025-05-01 DIAGNOSIS — Z86.0100 HISTORY OF COLON POLYPS: ICD-10-CM

## 2025-05-01 DIAGNOSIS — I10 HYPERTENSION, UNSPECIFIED TYPE: ICD-10-CM

## 2025-05-01 DIAGNOSIS — K70.31 ALCOHOLIC CIRRHOSIS OF LIVER WITH ASCITES (HCC): Primary | ICD-10-CM

## 2025-05-01 DIAGNOSIS — R13.10 DYSPHAGIA, UNSPECIFIED TYPE: ICD-10-CM

## 2025-05-01 DIAGNOSIS — Z12.11 COLON CANCER SCREENING: Primary | ICD-10-CM

## 2025-05-01 DIAGNOSIS — I85.10 SECONDARY ESOPHAGEAL VARICES WITHOUT BLEEDING (HCC): ICD-10-CM

## 2025-05-01 DIAGNOSIS — Z95.828 S/P TIPS (TRANSJUGULAR INTRAHEPATIC PORTOSYSTEMIC SHUNT): ICD-10-CM

## 2025-05-01 PROCEDURE — 3079F DIAST BP 80-89 MM HG: CPT | Performed by: INTERNAL MEDICINE

## 2025-05-01 PROCEDURE — G2211 COMPLEX E/M VISIT ADD ON: HCPCS | Performed by: INTERNAL MEDICINE

## 2025-05-01 PROCEDURE — 99214 OFFICE O/P EST MOD 30 MIN: CPT | Performed by: INTERNAL MEDICINE

## 2025-05-01 PROCEDURE — 1123F ACP DISCUSS/DSCN MKR DOCD: CPT | Performed by: INTERNAL MEDICINE

## 2025-05-01 PROCEDURE — 3077F SYST BP >= 140 MM HG: CPT | Performed by: INTERNAL MEDICINE

## 2025-05-01 RX ORDER — LACTULOSE 10 G/15ML
30 SOLUTION ORAL 4 TIMES DAILY
Qty: 946 ML | Refills: 0 | Status: SHIPPED | OUTPATIENT
Start: 2025-05-01 | End: 2025-07-30

## 2025-05-01 RX ORDER — POLYETHYLENE GLYCOL 3350, SODIUM CHLORIDE, SODIUM BICARBONATE, POTASSIUM CHLORIDE 420; 11.2; 5.72; 1.48 G/4L; G/4L; G/4L; G/4L
POWDER, FOR SOLUTION ORAL
Qty: 1 EACH | Refills: 0 | Status: SHIPPED | OUTPATIENT
Start: 2025-05-01

## 2025-05-01 ASSESSMENT — ENCOUNTER SYMPTOMS
TROUBLE SWALLOWING: 0
COLOR CHANGE: 0
ABDOMINAL PAIN: 1
COUGH: 0
CHOKING: 0
ANAL BLEEDING: 0
CONSTIPATION: 0
WHEEZING: 0
NAUSEA: 0
DIARRHEA: 1
SORE THROAT: 0
VOICE CHANGE: 0
VOMITING: 0
RECTAL PAIN: 0
ABDOMINAL DISTENTION: 0
BACK PAIN: 1
BLOOD IN STOOL: 0

## 2025-05-01 NOTE — PROGRESS NOTES
Reason for Referral:       No referring provider defined for this encounter.    Chief Complaint   Patient presents with    Follow-up     Labs, US Liver, ETOH Cirrohsis,S/P TIPS, Hepatic Enceph, Puritis           HISTORY OF PRESENT ILLNESS: Mr.Mark ARACELIS Lisa is a 64 y.o. male with a past history remarkable for cirrhosis secondary to EtOH abuse complicated by EV, recurrent ascites status post TIPS, TALA, hepatitis C treated with Harvoni, GERD, esophageal cancer treated with chemoradiation, history of bowel perforation requiring right hemicolectomy with end ileostomy reversed on 10/10/2023 , who presents for follow-up.  Was last seen by GI in June 2024 for melena and underwent an EGD that showed grade D esophagitis.    Today he reports no active symptoms.  He had a recent episode of confusion.  He does not drink alcohol.  He was referred to hepatopancreatic biliary surgery as well with appointment coming up.  He takes lactulose.    Interval history 1/23/2025:  Recently had a fall and was seen by trauma in the emergency room at [facility].  Reports having some back pain.  Is following with orthopedic.  Also has generalized itching and difficulty sleeping at night.  Last alcohol drink was 3 to 4 days ago.    Interval history 5/1/2025:  Reports that he occasionally has issues with constipation.  He is still drinking alcohol but has tried to cut down.    Previous Endoscopies    EGD 5/10/2024:  Impression- LA-grade-A esophagtiis                          Mild gastritis, biopsy obtained                          S/P removal of G-tube    EGD 6/26/2024:  Impression- LA-grade-D esophagitis     Previous GI workup   CT A/P 10/11/2024:  IMPRESSION:  1.  Moderate-sized pericardial effusion increased since July 2024.  Bilateral  pleural effusions.  2.  Patent TIPS.  3.  Gastric wall appears thickened.  4.  Splenic artery 2.8 cm aneurysm.    Past Medical,Family, and Social History reviewed and does contribute to the patient

## 2025-05-01 NOTE — TELEPHONE ENCOUNTER
Pt saw Dr. Olivia today in clinic. EGD/Colonoscopy ordered. Pt does not take blood thinners or GLP-1 medications. Pt OK to schedule procedure day after a holiday. Pt aware he will be on a clear liquid diet on holiday.     Procedure scheduled/Dr Olivia  Procedure: EGD/Colonoscopy (Screening)  Dx: Secondary esophageal varices without bleeding; Lezama's esophagus with grade dysplasia; Dysphagia, unspecified type; Hx of colon polyps  Date: Tuesday 05/27/25  Time: 2:00 pm/Arrive at 12:00 pm  Hospital: [facility]: Surgery Entrance, back of hospital  PAT Phone Call: Monday 05/19/25 at 2:00 pm  Bowel Prep instructions given: EGD Prep/Nulytely Split-Bowel Prep  In office/via phone: in office  Clearance needed: N/A  GLP-1: N/A    Pt informed it is required they must have a /responsible adult that takes them to their procedure, stays at the hospital (before, during, and after procedure), and drives pt home. Pt informed /responsible adult must stay inside the hospital during their procedure. Pt voiced understanding of  protocol for procedure.     Pt informed they will receive a PAT Phone Call from a [facility] PAT Nurse 1-2 weeks prior to procedure. Pt informed they must complete PAT Call or procedure may be cancelled.       
Yes

## 2025-05-02 ENCOUNTER — HOSPITAL ENCOUNTER (OUTPATIENT)
Age: 65
Discharge: HOME | End: 2025-05-02
Payer: COMMERCIAL

## 2025-05-02 DIAGNOSIS — K70.31 ALCOHOLIC CIRRHOSIS OF LIVER WITH ASCITES (HCC): ICD-10-CM

## 2025-05-02 LAB
AFP SERPL-MCNC: 2.9 UG/L
ALBUMIN SERPL-MCNC: 2.9 G/DL (ref 3.5–5.2)
ALP SERPL-CCNC: 119 U/L (ref 40–129)
ALT SERPL-CCNC: 11 U/L (ref 10–50)
ANION GAP SERPL CALCULATED.3IONS-SCNC: 10 MMOL/L (ref 9–16)
AST SERPL-CCNC: 35 U/L (ref 10–50)
BILIRUB DIRECT SERPL-MCNC: 0.5 MG/DL (ref 0–0.3)
BILIRUB INDIRECT SERPL-MCNC: 0.4 MG/DL (ref 0–1)
BILIRUB SERPL-MCNC: 0.9 MG/DL (ref 0–1.2)
BUN SERPL-MCNC: 8 MG/DL (ref 8–23)
CALCIUM SERPL-MCNC: 8.8 MG/DL (ref 8.6–10.4)
CHLORIDE SERPL-SCNC: 111 MMOL/L (ref 98–107)
CO2 SERPL-SCNC: 21 MMOL/L (ref 20–31)
CREAT SERPL-MCNC: 1.2 MG/DL (ref 0.7–1.2)
ERYTHROCYTE [DISTWIDTH] IN BLOOD BY AUTOMATED COUNT: 21.4 % (ref 11.5–14.9)
GFR, ESTIMATED: 67 ML/MIN/1.73M2
GLUCOSE SERPL-MCNC: 142 MG/DL (ref 74–99)
HCT VFR BLD AUTO: 26.7 % (ref 41–53)
HGB BLD-MCNC: 8.1 G/DL (ref 13.5–17.5)
INR PPP: 1.2
MCH RBC QN AUTO: 23.6 PG (ref 26–34)
MCHC RBC AUTO-ENTMCNC: 30.3 G/DL (ref 31–37)
MCV RBC AUTO: 77.7 FL (ref 80–100)
PLATELET # BLD AUTO: 175 K/UL (ref 150–450)
PMV BLD AUTO: 8.2 FL (ref 6–12)
POTASSIUM SERPL-SCNC: 4.1 MMOL/L (ref 3.7–5.3)
PROT SERPL-MCNC: 5.8 G/DL (ref 6.6–8.7)
PROTHROMBIN TIME: 16.7 SEC (ref 11.8–14.6)
RBC # BLD AUTO: 3.43 M/UL (ref 4.5–5.9)
SODIUM SERPL-SCNC: 142 MMOL/L (ref 136–145)
WBC OTHER # BLD: 5.7 K/UL (ref 3.5–11)

## 2025-05-02 PROCEDURE — 82248 BILIRUBIN DIRECT: CPT

## 2025-05-02 PROCEDURE — 82105 ALPHA-FETOPROTEIN SERUM: CPT

## 2025-05-02 PROCEDURE — 36415 COLL VENOUS BLD VENIPUNCTURE: CPT

## 2025-05-02 PROCEDURE — 80053 COMPREHEN METABOLIC PANEL: CPT

## 2025-05-02 PROCEDURE — 85610 PROTHROMBIN TIME: CPT

## 2025-05-02 PROCEDURE — 85027 COMPLETE CBC AUTOMATED: CPT

## 2025-05-02 RX ORDER — LACTULOSE 10 G/15ML
SOLUTION ORAL
Qty: 946 ML | Refills: 0 | OUTPATIENT
Start: 2025-05-02

## 2025-05-14 RX ORDER — ATORVASTATIN CALCIUM 40 MG/1
40 TABLET, FILM COATED ORAL NIGHTLY
Qty: 30 TABLET | Refills: 0 | OUTPATIENT
Start: 2025-05-14

## 2025-05-19 ENCOUNTER — HOSPITAL ENCOUNTER (OUTPATIENT)
Dept: PREADMISSION TESTING | Age: 65
Discharge: HOME | End: 2025-05-23

## 2025-05-19 VITALS — WEIGHT: 212 LBS | HEIGHT: 70 IN | BODY MASS INDEX: 30.35 KG/M2

## 2025-05-19 DIAGNOSIS — R18.8 OTHER ASCITES: Primary | ICD-10-CM

## 2025-05-19 NOTE — PROGRESS NOTES
Pre-op Instructions For Out-Patient Endoscopy Surgery    Medication Instructions:  Please stop herbs and any supplements now (includes vitamins and minerals).    For these medications:  Dulaglutide (Trulicity), Exenatide (Byetta and Bydureon, Liraglutide (Victoza), Lixisenatide (Adlyxin), Semaglutide (Ozempic and Rybelsus), Tirzepatide (Mounjaro, Zepbound,Wegovy)- Stop 1 week prior if taking weekly or 1 day prior if taking every 12 hours or daily.     Please contact your surgeon and prescribing physician for pre-op instructions for any blood thinners.    If you have inhalers/aerosol treatments at home, please use them the morning of your surgery and bring the inhalers with you to the hospital.    Please take the following medications the morning of your surgery with a sip of water:    metoprolol    Surgery Instructions:  After midnight before surgery:  Do not eat or drink anything, including water, mints, gum, and hard candy.  You may brush your teeth without swallowing.  No smoking, chewing tobacco, or street drugs.       ** Please Follow Bowel Prep instructions if given by surgeon's office**    Please shower or bathe before surgery.       Please do not wear any cologne, lotion, powder, jewelry, piercings, perfume, makeup, nail polish, hair accessories, or hair spray on the day of surgery.  Wear loose comfortable clothing.    Leave your valuables at home but bring a payment source for any after-surgery prescriptions you plan to fill at [facility].  Bring a storage case for any glasses/contacts.    An adult who is responsible for you MUST remain in the hospital and drive you home and should be with you for the first 24 hours after surgery.     The Day of Surgery:  Arrive at [facility] Surgery Entrance at the time directed by your surgeon and check in at the desk.     If you have a living will or healthcare power of , please bring a copy.    You will be taken to the pre-op holding

## 2025-05-20 ENCOUNTER — APPOINTMENT (OUTPATIENT)
Dept: GENERAL RADIOLOGY | Age: 65
DRG: 812 | End: 2025-05-20
Payer: COMMERCIAL

## 2025-05-20 ENCOUNTER — APPOINTMENT (OUTPATIENT)
Dept: CT IMAGING | Age: 65
DRG: 812 | End: 2025-05-20
Payer: COMMERCIAL

## 2025-05-20 ENCOUNTER — HOSPITAL ENCOUNTER (INPATIENT)
Age: 65
LOS: 2 days | Discharge: HOME HEALTH | DRG: 812 | End: 2025-05-22
Attending: EMERGENCY MEDICINE | Admitting: INTERNAL MEDICINE
Payer: COMMERCIAL

## 2025-05-20 ENCOUNTER — TELEPHONE (OUTPATIENT)
Dept: PRIMARY CARE CLINIC | Age: 65
End: 2025-05-20

## 2025-05-20 DIAGNOSIS — D50.9 IRON DEFICIENCY ANEMIA, UNSPECIFIED IRON DEFICIENCY ANEMIA TYPE: ICD-10-CM

## 2025-05-20 DIAGNOSIS — R07.9 CHEST PAIN, UNSPECIFIED TYPE: ICD-10-CM

## 2025-05-20 DIAGNOSIS — D64.9 ANEMIA, UNSPECIFIED TYPE: Primary | ICD-10-CM

## 2025-05-20 LAB
ALBUMIN SERPL-MCNC: 2.6 G/DL (ref 3.5–5.2)
ALP SERPL-CCNC: 123 U/L (ref 40–129)
ALT SERPL-CCNC: 12 U/L (ref 10–50)
ANION GAP SERPL CALCULATED.3IONS-SCNC: 10 MMOL/L (ref 9–16)
AST SERPL-CCNC: 32 U/L (ref 10–50)
BASOPHILS # BLD: 0.06 K/UL (ref 0–0.2)
BASOPHILS NFR BLD: 1 % (ref 0–2)
BILIRUB SERPL-MCNC: 0.8 MG/DL (ref 0–1.2)
BNP SERPL-MCNC: 525 PG/ML (ref 0–300)
BUN SERPL-MCNC: 10 MG/DL (ref 8–23)
CALCIUM SERPL-MCNC: 8.3 MG/DL (ref 8.6–10.4)
CHLORIDE SERPL-SCNC: 109 MMOL/L (ref 98–107)
CO2 SERPL-SCNC: 20 MMOL/L (ref 20–31)
CREAT SERPL-MCNC: 1.1 MG/DL (ref 0.7–1.2)
DATE, STOOL #1: NORMAL
EOSINOPHIL # BLD: 0.19 K/UL (ref 0–0.44)
EOSINOPHILS RELATIVE PERCENT: 3 % (ref 0–4)
ERYTHROCYTE [DISTWIDTH] IN BLOOD BY AUTOMATED COUNT: 20.2 % (ref 11.5–14.9)
GFR, ESTIMATED: 74 ML/MIN/1.73M2
GLUCOSE SERPL-MCNC: 128 MG/DL (ref 74–99)
HCT VFR BLD AUTO: 23 % (ref 41–53)
HEMOCCULT SP1 STL QL: NEGATIVE
HGB BLD-MCNC: 6.4 G/DL (ref 13.5–17.5)
IMM GRANULOCYTES # BLD AUTO: 0 K/UL (ref 0–0.3)
IMM GRANULOCYTES NFR BLD: 0 %
LIPASE SERPL-CCNC: 31 U/L (ref 13–60)
LYMPHOCYTES NFR BLD: 0.81 K/UL (ref 1.1–3.7)
LYMPHOCYTES RELATIVE PERCENT: 13 % (ref 24–44)
MAGNESIUM SERPL-MCNC: 1.6 MG/DL (ref 1.6–2.4)
MCH RBC QN AUTO: 23.2 PG (ref 26–34)
MCHC RBC AUTO-ENTMCNC: 27.8 G/DL (ref 31–37)
MCV RBC AUTO: 83.3 FL (ref 80–100)
MONOCYTES NFR BLD: 0.87 K/UL (ref 0.1–1.2)
MONOCYTES NFR BLD: 14 % (ref 3–12)
MORPHOLOGY: ABNORMAL
NEUTROPHILS NFR BLD: 69 % (ref 36–66)
NEUTS SEG NFR BLD: 4.27 K/UL (ref 1.5–8.1)
NRBC BLD-RTO: 0 PER 100 WBC
PLATELET # BLD AUTO: 151 K/UL (ref 150–450)
PMV BLD AUTO: 10.7 FL (ref 8–13.5)
POTASSIUM SERPL-SCNC: 4.1 MMOL/L (ref 3.7–5.3)
PROT SERPL-MCNC: 5.3 G/DL (ref 6.6–8.7)
RBC # BLD AUTO: 2.76 M/UL (ref 4.21–5.77)
SODIUM SERPL-SCNC: 139 MMOL/L (ref 136–145)
TIME, STOOL #1: NORMAL
TROPONIN I SERPL HS-MCNC: 14 NG/L (ref 0–22)
TROPONIN I SERPL HS-MCNC: 15 NG/L (ref 0–22)
WBC OTHER # BLD: 6.2 K/UL (ref 3.5–11)

## 2025-05-20 PROCEDURE — 86900 BLOOD TYPING SEROLOGIC ABO: CPT

## 2025-05-20 PROCEDURE — 36430 TRANSFUSION BLD/BLD COMPNT: CPT

## 2025-05-20 PROCEDURE — 83690 ASSAY OF LIPASE: CPT

## 2025-05-20 PROCEDURE — 82270 OCCULT BLOOD FECES: CPT

## 2025-05-20 PROCEDURE — 71260 CT THORAX DX C+: CPT

## 2025-05-20 PROCEDURE — 2580000003 HC RX 258: Performed by: NURSE PRACTITIONER

## 2025-05-20 PROCEDURE — 86920 COMPATIBILITY TEST SPIN: CPT

## 2025-05-20 PROCEDURE — 6370000000 HC RX 637 (ALT 250 FOR IP): Performed by: NURSE PRACTITIONER

## 2025-05-20 PROCEDURE — 2580000003 HC RX 258

## 2025-05-20 PROCEDURE — 74177 CT ABD & PELVIS W/CONTRAST: CPT

## 2025-05-20 PROCEDURE — 2060000000 HC ICU INTERMEDIATE R&B

## 2025-05-20 PROCEDURE — 86850 RBC ANTIBODY SCREEN: CPT

## 2025-05-20 PROCEDURE — 6360000002 HC RX W HCPCS

## 2025-05-20 PROCEDURE — 80053 COMPREHEN METABOLIC PANEL: CPT

## 2025-05-20 PROCEDURE — 86901 BLOOD TYPING SEROLOGIC RH(D): CPT

## 2025-05-20 PROCEDURE — 96374 THER/PROPH/DIAG INJ IV PUSH: CPT

## 2025-05-20 PROCEDURE — 30233N1 TRANSFUSION OF NONAUTOLOGOUS RED BLOOD CELLS INTO PERIPHERAL VEIN, PERCUTANEOUS APPROACH: ICD-10-PCS | Performed by: INTERNAL MEDICINE

## 2025-05-20 PROCEDURE — 85025 COMPLETE CBC W/AUTO DIFF WBC: CPT

## 2025-05-20 PROCEDURE — 2500000003 HC RX 250 WO HCPCS

## 2025-05-20 PROCEDURE — 84484 ASSAY OF TROPONIN QUANT: CPT

## 2025-05-20 PROCEDURE — 83880 ASSAY OF NATRIURETIC PEPTIDE: CPT

## 2025-05-20 PROCEDURE — P9016 RBC LEUKOCYTES REDUCED: HCPCS

## 2025-05-20 PROCEDURE — 83735 ASSAY OF MAGNESIUM: CPT

## 2025-05-20 PROCEDURE — 93005 ELECTROCARDIOGRAM TRACING: CPT

## 2025-05-20 PROCEDURE — 36415 COLL VENOUS BLD VENIPUNCTURE: CPT

## 2025-05-20 PROCEDURE — 2500000003 HC RX 250 WO HCPCS: Performed by: NURSE PRACTITIONER

## 2025-05-20 PROCEDURE — 6360000004 HC RX CONTRAST MEDICATION

## 2025-05-20 PROCEDURE — 71046 X-RAY EXAM CHEST 2 VIEWS: CPT

## 2025-05-20 PROCEDURE — 99285 EMERGENCY DEPT VISIT HI MDM: CPT

## 2025-05-20 RX ORDER — SODIUM CHLORIDE 0.9 % (FLUSH) 0.9 %
5-40 SYRINGE (ML) INJECTION EVERY 12 HOURS SCHEDULED
Status: DISCONTINUED | OUTPATIENT
Start: 2025-05-20 | End: 2025-05-22 | Stop reason: HOSPADM

## 2025-05-20 RX ORDER — ACETAMINOPHEN 650 MG/1
650 SUPPOSITORY RECTAL EVERY 6 HOURS PRN
Status: DISCONTINUED | OUTPATIENT
Start: 2025-05-20 | End: 2025-05-22 | Stop reason: HOSPADM

## 2025-05-20 RX ORDER — DULOXETIN HYDROCHLORIDE 30 MG/1
30 CAPSULE, DELAYED RELEASE ORAL DAILY
Status: DISCONTINUED | OUTPATIENT
Start: 2025-05-21 | End: 2025-05-22 | Stop reason: HOSPADM

## 2025-05-20 RX ORDER — LACTULOSE 10 G/15ML
30 SOLUTION ORAL 4 TIMES DAILY
Status: DISCONTINUED | OUTPATIENT
Start: 2025-05-20 | End: 2025-05-21

## 2025-05-20 RX ORDER — MIRTAZAPINE 15 MG/1
15 TABLET, ORALLY DISINTEGRATING ORAL NIGHTLY
Status: DISCONTINUED | OUTPATIENT
Start: 2025-05-20 | End: 2025-05-22 | Stop reason: HOSPADM

## 2025-05-20 RX ORDER — ACETAMINOPHEN 325 MG/1
650 TABLET ORAL EVERY 6 HOURS PRN
Status: DISCONTINUED | OUTPATIENT
Start: 2025-05-20 | End: 2025-05-22 | Stop reason: HOSPADM

## 2025-05-20 RX ORDER — METOPROLOL TARTRATE 25 MG/1
12.5 TABLET, FILM COATED ORAL 2 TIMES DAILY
Status: DISCONTINUED | OUTPATIENT
Start: 2025-05-20 | End: 2025-05-22

## 2025-05-20 RX ORDER — SODIUM CHLORIDE 0.9 % (FLUSH) 0.9 %
5-40 SYRINGE (ML) INJECTION PRN
Status: DISCONTINUED | OUTPATIENT
Start: 2025-05-20 | End: 2025-05-22 | Stop reason: HOSPADM

## 2025-05-20 RX ORDER — LANOLIN ALCOHOL/MO/W.PET/CERES
400 CREAM (GRAM) TOPICAL DAILY
Status: DISCONTINUED | OUTPATIENT
Start: 2025-05-21 | End: 2025-05-22 | Stop reason: HOSPADM

## 2025-05-20 RX ORDER — FERROUS SULFATE 325(65) MG
325 TABLET ORAL 2 TIMES DAILY
Status: DISCONTINUED | OUTPATIENT
Start: 2025-05-21 | End: 2025-05-22 | Stop reason: HOSPADM

## 2025-05-20 RX ORDER — IOPAMIDOL 755 MG/ML
75 INJECTION, SOLUTION INTRAVASCULAR
Status: COMPLETED | OUTPATIENT
Start: 2025-05-20 | End: 2025-05-20

## 2025-05-20 RX ORDER — 0.9 % SODIUM CHLORIDE 0.9 %
100 INTRAVENOUS SOLUTION INTRAVENOUS ONCE
Status: COMPLETED | OUTPATIENT
Start: 2025-05-20 | End: 2025-05-20

## 2025-05-20 RX ORDER — SODIUM CHLORIDE 9 MG/ML
INJECTION, SOLUTION INTRAVENOUS CONTINUOUS
Status: DISCONTINUED | OUTPATIENT
Start: 2025-05-20 | End: 2025-05-22 | Stop reason: HOSPADM

## 2025-05-20 RX ORDER — GABAPENTIN 100 MG/1
100 CAPSULE ORAL 2 TIMES DAILY
Status: DISCONTINUED | OUTPATIENT
Start: 2025-05-20 | End: 2025-05-22 | Stop reason: HOSPADM

## 2025-05-20 RX ORDER — SODIUM CHLORIDE 9 MG/ML
INJECTION, SOLUTION INTRAVENOUS PRN
Status: DISCONTINUED | OUTPATIENT
Start: 2025-05-20 | End: 2025-05-22 | Stop reason: HOSPADM

## 2025-05-20 RX ORDER — SUCRALFATE 1 G/1
1 TABLET ORAL
Status: DISCONTINUED | OUTPATIENT
Start: 2025-05-21 | End: 2025-05-22 | Stop reason: HOSPADM

## 2025-05-20 RX ORDER — FOLIC ACID 1 MG/1
1000 TABLET ORAL DAILY
Status: DISCONTINUED | OUTPATIENT
Start: 2025-05-21 | End: 2025-05-22 | Stop reason: HOSPADM

## 2025-05-20 RX ORDER — ATORVASTATIN CALCIUM 40 MG/1
40 TABLET, FILM COATED ORAL NIGHTLY
Status: DISCONTINUED | OUTPATIENT
Start: 2025-05-21 | End: 2025-05-22 | Stop reason: HOSPADM

## 2025-05-20 RX ORDER — SODIUM CHLORIDE 0.9 % (FLUSH) 0.9 %
10 SYRINGE (ML) INJECTION PRN
Status: DISCONTINUED | OUTPATIENT
Start: 2025-05-20 | End: 2025-05-22 | Stop reason: HOSPADM

## 2025-05-20 RX ADMIN — METOPROLOL TARTRATE 12.5 MG: 25 TABLET, FILM COATED ORAL at 22:52

## 2025-05-20 RX ADMIN — IOPAMIDOL 75 ML: 755 INJECTION, SOLUTION INTRAVENOUS at 17:33

## 2025-05-20 RX ADMIN — SODIUM CHLORIDE, PRESERVATIVE FREE 10 ML: 5 INJECTION INTRAVENOUS at 17:33

## 2025-05-20 RX ADMIN — MIRTAZAPINE 15 MG: 15 TABLET, ORALLY DISINTEGRATING ORAL at 23:18

## 2025-05-20 RX ADMIN — GABAPENTIN 100 MG: 100 CAPSULE ORAL at 22:52

## 2025-05-20 RX ADMIN — SODIUM CHLORIDE, PRESERVATIVE FREE 10 ML: 5 INJECTION INTRAVENOUS at 22:53

## 2025-05-20 RX ADMIN — PANTOPRAZOLE SODIUM 80 MG: 40 INJECTION, POWDER, FOR SOLUTION INTRAVENOUS at 17:54

## 2025-05-20 RX ADMIN — SODIUM CHLORIDE 100 ML: 9 INJECTION, SOLUTION INTRAVENOUS at 17:34

## 2025-05-20 RX ADMIN — SODIUM CHLORIDE: 9 INJECTION, SOLUTION INTRAVENOUS at 23:03

## 2025-05-20 ASSESSMENT — LIFESTYLE VARIABLES
HOW OFTEN DO YOU HAVE A DRINK CONTAINING ALCOHOL: 2-4 TIMES A MONTH
HOW MANY STANDARD DRINKS CONTAINING ALCOHOL DO YOU HAVE ON A TYPICAL DAY: 1 OR 2

## 2025-05-20 ASSESSMENT — PAIN - FUNCTIONAL ASSESSMENT: PAIN_FUNCTIONAL_ASSESSMENT: NONE - DENIES PAIN

## 2025-05-20 NOTE — CONSENT
Informed Consent for Blood Component Transfusion Note    I have discussed with the patient the rationale for blood component transfusion; its benefits in treating or preventing fatigue, organ damage, or death; and its risk which includes mild transfusion reactions, rare risk of blood borne infection, or more serious but rare reactions. I have discussed the alternatives to transfusion, including the risk and consequences of not receiving transfusion. The patient had an opportunity to ask questions and had agreed to proceed with transfusion of blood components.    Electronically signed by Quinn Cage MD on 5/20/25 at 5:46 PM EDT

## 2025-05-20 NOTE — PROGRESS NOTES
Received call from Frank' \"felt like I may have had a heart attack yesterday after I ate a cheeseburger,was diaphoretic and my heart felt like it was jumping out of my chest...feel better today, what should I do?\"  Encouraged to contact PCP or report to ED if needed.

## 2025-05-20 NOTE — ED PROVIDER NOTES
EMERGENCY DEPARTMENT ENCOUNTER   ATTENDING ATTESTATION     Pt Name: Frank Lisa  MRN: [id number]  Birthdate 1960  Date of evaluation: 5/20/25       Frank Lisa is a 64 y.o. male who presents with Chest Pain (Sudden onset last night with SOB, nausea and diaphoresis/Increased WOB observed, /Radiation to left arm, feels like he's having a heart attack)      MDM:   Some chest tightness.  He is acutely anemic history of GI bleed cirrhosis.  Organ to do an occult blood test transfuse him start him Protonix and admit him.  Checking CT imaging of his chest abdomen pelvis.  He agrees with the plan.  He consents for blood transfusion.    Vitals:   Vitals:    05/20/25 1554 05/20/25 1555 05/20/25 1556 05/20/25 1600   BP: (!) 146/64 (!) 146/64  (!) 172/91   Pulse: 89 87  86   Resp: 20 28  22   Temp:   97.3 °F (36.3 °C)    SpO2: 100% 100%  100%   Weight:  96.2 kg (212 lb)     Height:  1.778 m (5' 10\")           I personally saw and examined the patient. I have reviewed and agree with the resident's findings, including all diagnostic interpretations and treatment plan as written. I was present for the key portions of any procedures performed and the inclusive time noted for any critical care statement.    Quinn Cage MD  Attending Emergency Physician            Quinn Cage MD  05/20/25 2734

## 2025-05-20 NOTE — CONSENT
Informed Consent for Blood Component Transfusion Note    I have discussed with the patient the rationale for blood component transfusion; its benefits in treating or preventing fatigue, organ damage, or death; and its risk which includes mild transfusion reactions, rare risk of blood borne infection, or more serious but rare reactions. I have discussed the alternatives to transfusion, including the risk and consequences of not receiving transfusion. The patient had an opportunity to ask questions and had agreed to proceed with transfusion of blood components.    Electronically signed by Sara Rodriguez MD on 5/20/25 at 5:14 PM EDT

## 2025-05-20 NOTE — TELEPHONE ENCOUNTER
Patient states he believes he had a heart attack yesterday. States after eating he felt nauseated and dizzy and had an episode where it felt like his heart was beating out of his chest. He asked if he should go to the ED or come here, he was advised to go to the ED.

## 2025-05-20 NOTE — ED PROVIDER NOTES
[facility] CARE  Emergency Department Encounter  Emergency Medicine Resident     Pt Name:Frank Lisa  MRN: [id number]  Birthdate 1960  Date of evaluation: 5/20/25  PCP:  Lopez Rosario PA  Note Started: 3:56 PM EDT      CHIEF COMPLAINT       Chief Complaint   Patient presents with    Chest Pain     Sudden onset last night with SOB, nausea and diaphoresis  Increased WOB observed,   Radiation to left arm, feels like he's having a heart attack       HISTORY OF PRESENT ILLNESS  (Location/Symptom, Timing/Onset, Context/Setting, Quality, Duration, Modifying Factors, Severity.)      Frank Lisa is a 64 y.o. male who presents with sudden onset of chest pain with shortness of breath last night associated with nausea and diaphoresis.  Patient states he has had increased work of breathing and shortness of breath since.  Pain at the time radiated to his left arm.  States he felt like he was having heart attack.  No previous history of MIs.  Patient does have a history of esophageal cancer approximately 5 years ago that was treated.  Currently is being worked up with a planned scope coming up for dysphagia.  History of the TIPS procedure and ascites that resolved after his TIPS procedure.  Has had a bowel resections in the past.  Currently denies any fevers, chills.  Currently intermittent chest pain but primarily experiencing shortness of breath.  No sick contacts.  Denies any cough, congestion.  Does feel though his abdomen is slightly distended and has put on some weight recently.  No abdominal pain.  Is having bowel movements.  Denies any black tarry stools or blood in the stool.    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

## 2025-05-21 ENCOUNTER — ANESTHESIA (OUTPATIENT)
Dept: ENDOSCOPY | Age: 65
End: 2025-05-21
Payer: COMMERCIAL

## 2025-05-21 ENCOUNTER — ANESTHESIA EVENT (OUTPATIENT)
Dept: ENDOSCOPY | Age: 65
End: 2025-05-21
Payer: COMMERCIAL

## 2025-05-21 LAB
ALBUMIN SERPL-MCNC: 2.4 G/DL (ref 3.5–5.2)
ALP SERPL-CCNC: 104 U/L (ref 40–129)
ALT SERPL-CCNC: 14 U/L (ref 10–50)
ANION GAP SERPL CALCULATED.3IONS-SCNC: 7 MMOL/L (ref 9–16)
AST SERPL-CCNC: 30 U/L (ref 10–50)
BILIRUB SERPL-MCNC: 1.1 MG/DL (ref 0–1.2)
BUN SERPL-MCNC: 10 MG/DL (ref 8–23)
CALCIUM SERPL-MCNC: 8.1 MG/DL (ref 8.6–10.4)
CHLORIDE SERPL-SCNC: 114 MMOL/L (ref 98–107)
CO2 SERPL-SCNC: 20 MMOL/L (ref 20–31)
CREAT SERPL-MCNC: 1 MG/DL (ref 0.7–1.2)
GFR, ESTIMATED: 84 ML/MIN/1.73M2
GLUCOSE SERPL-MCNC: 89 MG/DL (ref 74–99)
HCT VFR BLD AUTO: 23 % (ref 41–53)
HCT VFR BLD AUTO: 23.9 % (ref 41–53)
HCT VFR BLD AUTO: 24.2 % (ref 41–53)
HCT VFR BLD AUTO: 26 % (ref 41–53)
HGB BLD-MCNC: 7.1 G/DL (ref 13.5–17.5)
HGB BLD-MCNC: 7.2 G/DL (ref 13.5–17.5)
HGB BLD-MCNC: 7.4 G/DL (ref 13.5–17.5)
HGB BLD-MCNC: 7.8 G/DL (ref 13.5–17.5)
INR PPP: 1.3
IRON SATN MFR SERPL: 10 % (ref 20–55)
IRON SERPL-MCNC: 25 UG/DL (ref 61–157)
PARTIAL THROMBOPLASTIN TIME: 38.1 SEC (ref 24–36)
POTASSIUM SERPL-SCNC: 4 MMOL/L (ref 3.7–5.3)
PROT SERPL-MCNC: 4.7 G/DL (ref 6.6–8.7)
PROTHROMBIN TIME: 17.8 SEC (ref 11.8–14.6)
SODIUM SERPL-SCNC: 141 MMOL/L (ref 136–145)
TIBC SERPL-MCNC: 253 UG/DL (ref 250–450)
UNSATURATED IRON BINDING CAPACITY: 228 UG/DL (ref 112–347)

## 2025-05-21 PROCEDURE — 88305 TISSUE EXAM BY PATHOLOGIST: CPT

## 2025-05-21 PROCEDURE — 85014 HEMATOCRIT: CPT

## 2025-05-21 PROCEDURE — 2580000003 HC RX 258: Performed by: INTERNAL MEDICINE

## 2025-05-21 PROCEDURE — 99223 1ST HOSP IP/OBS HIGH 75: CPT | Performed by: INTERNAL MEDICINE

## 2025-05-21 PROCEDURE — 43244 EGD VARICES LIGATION: CPT | Performed by: INTERNAL MEDICINE

## 2025-05-21 PROCEDURE — 6360000002 HC RX W HCPCS: Performed by: NURSE ANESTHETIST, CERTIFIED REGISTERED

## 2025-05-21 PROCEDURE — 2500000003 HC RX 250 WO HCPCS: Performed by: INTERNAL MEDICINE

## 2025-05-21 PROCEDURE — 6360000002 HC RX W HCPCS: Performed by: INTERNAL MEDICINE

## 2025-05-21 PROCEDURE — APPNB30 APP NON BILLABLE TIME 0-30 MINS: Performed by: NURSE PRACTITIONER

## 2025-05-21 PROCEDURE — 7100000010 HC PHASE II RECOVERY - FIRST 15 MIN: Performed by: INTERNAL MEDICINE

## 2025-05-21 PROCEDURE — 3609012300 HC EGD BAND LIGATION ESOPHGEAL/GASTRIC VARICES: Performed by: INTERNAL MEDICINE

## 2025-05-21 PROCEDURE — 6370000000 HC RX 637 (ALT 250 FOR IP): Performed by: NURSE PRACTITIONER

## 2025-05-21 PROCEDURE — 85730 THROMBOPLASTIN TIME PARTIAL: CPT

## 2025-05-21 PROCEDURE — 3700000001 HC ADD 15 MINUTES (ANESTHESIA): Performed by: INTERNAL MEDICINE

## 2025-05-21 PROCEDURE — 6370000000 HC RX 637 (ALT 250 FOR IP): Performed by: INTERNAL MEDICINE

## 2025-05-21 PROCEDURE — 88342 IMHCHEM/IMCYTCHM 1ST ANTB: CPT

## 2025-05-21 PROCEDURE — 2709999900 HC NON-CHARGEABLE SUPPLY: Performed by: INTERNAL MEDICINE

## 2025-05-21 PROCEDURE — 43239 EGD BIOPSY SINGLE/MULTIPLE: CPT | Performed by: INTERNAL MEDICINE

## 2025-05-21 PROCEDURE — 80053 COMPREHEN METABOLIC PANEL: CPT

## 2025-05-21 PROCEDURE — 2580000003 HC RX 258: Performed by: NURSE PRACTITIONER

## 2025-05-21 PROCEDURE — 7100000011 HC PHASE II RECOVERY - ADDTL 15 MIN: Performed by: INTERNAL MEDICINE

## 2025-05-21 PROCEDURE — 0DB68ZX EXCISION OF STOMACH, VIA NATURAL OR ARTIFICIAL OPENING ENDOSCOPIC, DIAGNOSTIC: ICD-10-PCS | Performed by: INTERNAL MEDICINE

## 2025-05-21 PROCEDURE — 3700000000 HC ANESTHESIA ATTENDED CARE: Performed by: INTERNAL MEDICINE

## 2025-05-21 PROCEDURE — 0DB98ZX EXCISION OF DUODENUM, VIA NATURAL OR ARTIFICIAL OPENING ENDOSCOPIC, DIAGNOSTIC: ICD-10-PCS | Performed by: INTERNAL MEDICINE

## 2025-05-21 PROCEDURE — 6370000000 HC RX 637 (ALT 250 FOR IP)

## 2025-05-21 PROCEDURE — 83540 ASSAY OF IRON: CPT

## 2025-05-21 PROCEDURE — 2580000003 HC RX 258: Performed by: NURSE ANESTHETIST, CERTIFIED REGISTERED

## 2025-05-21 PROCEDURE — 2500000003 HC RX 250 WO HCPCS: Performed by: NURSE PRACTITIONER

## 2025-05-21 PROCEDURE — 85610 PROTHROMBIN TIME: CPT

## 2025-05-21 PROCEDURE — 83550 IRON BINDING TEST: CPT

## 2025-05-21 PROCEDURE — 6360000002 HC RX W HCPCS: Performed by: NURSE PRACTITIONER

## 2025-05-21 PROCEDURE — 2060000000 HC ICU INTERMEDIATE R&B

## 2025-05-21 PROCEDURE — 85018 HEMOGLOBIN: CPT

## 2025-05-21 PROCEDURE — 36415 COLL VENOUS BLD VENIPUNCTURE: CPT

## 2025-05-21 PROCEDURE — 0DB58ZX EXCISION OF ESOPHAGUS, VIA NATURAL OR ARTIFICIAL OPENING ENDOSCOPIC, DIAGNOSTIC: ICD-10-PCS | Performed by: INTERNAL MEDICINE

## 2025-05-21 PROCEDURE — 2720000010 HC SURG SUPPLY STERILE: Performed by: INTERNAL MEDICINE

## 2025-05-21 RX ORDER — GABAPENTIN 100 MG/1
100 CAPSULE ORAL ONCE
Status: DISCONTINUED | OUTPATIENT
Start: 2025-05-21 | End: 2025-05-21

## 2025-05-21 RX ORDER — SODIUM CHLORIDE 9 MG/ML
INJECTION, SOLUTION INTRAVENOUS
Status: DISCONTINUED | OUTPATIENT
Start: 2025-05-21 | End: 2025-05-21 | Stop reason: SDUPTHER

## 2025-05-21 RX ORDER — FENTANYL CITRATE 0.05 MG/ML
25 INJECTION, SOLUTION INTRAMUSCULAR; INTRAVENOUS ONCE
Status: COMPLETED | OUTPATIENT
Start: 2025-05-21 | End: 2025-05-21

## 2025-05-21 RX ORDER — LACTULOSE 10 G/15ML
30 SOLUTION ORAL 3 TIMES DAILY
Status: DISCONTINUED | OUTPATIENT
Start: 2025-05-21 | End: 2025-05-22 | Stop reason: HOSPADM

## 2025-05-21 RX ORDER — GLYCOPYRROLATE 0.2 MG/ML
INJECTION INTRAMUSCULAR; INTRAVENOUS
Status: DISCONTINUED | OUTPATIENT
Start: 2025-05-21 | End: 2025-05-21 | Stop reason: SDUPTHER

## 2025-05-21 RX ORDER — PROPOFOL 10 MG/ML
INJECTION, EMULSION INTRAVENOUS
Status: DISCONTINUED | OUTPATIENT
Start: 2025-05-21 | End: 2025-05-21 | Stop reason: SDUPTHER

## 2025-05-21 RX ORDER — PROCHLORPERAZINE EDISYLATE 5 MG/ML
10 INJECTION INTRAMUSCULAR; INTRAVENOUS EVERY 6 HOURS PRN
Status: DISCONTINUED | OUTPATIENT
Start: 2025-05-21 | End: 2025-05-22 | Stop reason: HOSPADM

## 2025-05-21 RX ORDER — LIDOCAINE HYDROCHLORIDE 20 MG/ML
INJECTION, SOLUTION EPIDURAL; INFILTRATION; INTRACAUDAL; PERINEURAL
Status: DISCONTINUED | OUTPATIENT
Start: 2025-05-21 | End: 2025-05-21 | Stop reason: SDUPTHER

## 2025-05-21 RX ADMIN — DULOXETINE HYDROCHLORIDE 30 MG: 30 CAPSULE, DELAYED RELEASE ORAL at 14:58

## 2025-05-21 RX ADMIN — Medication 3 MG: at 00:11

## 2025-05-21 RX ADMIN — GABAPENTIN 100 MG: 100 CAPSULE ORAL at 20:30

## 2025-05-21 RX ADMIN — SODIUM CHLORIDE 40 MG: 9 INJECTION INTRAMUSCULAR; INTRAVENOUS; SUBCUTANEOUS at 08:53

## 2025-05-21 RX ADMIN — PROPOFOL 150 MCG/KG/MIN: 10 INJECTION, EMULSION INTRAVENOUS at 13:29

## 2025-05-21 RX ADMIN — SODIUM CHLORIDE, PRESERVATIVE FREE 10 ML: 5 INJECTION INTRAVENOUS at 20:24

## 2025-05-21 RX ADMIN — SODIUM CHLORIDE 125 MG: 9 INJECTION, SOLUTION INTRAVENOUS at 14:57

## 2025-05-21 RX ADMIN — SODIUM CHLORIDE: 9 INJECTION, SOLUTION INTRAVENOUS at 20:25

## 2025-05-21 RX ADMIN — SODIUM CHLORIDE: 9 INJECTION, SOLUTION INTRAVENOUS at 18:33

## 2025-05-21 RX ADMIN — SUCRALFATE 1 G: 1 TABLET ORAL at 14:59

## 2025-05-21 RX ADMIN — ACETAMINOPHEN 650 MG: 325 TABLET ORAL at 14:01

## 2025-05-21 RX ADMIN — LACTULOSE 30 G: 20 SOLUTION ORAL at 20:28

## 2025-05-21 RX ADMIN — Medication 400 MG: at 14:58

## 2025-05-21 RX ADMIN — ATORVASTATIN CALCIUM 40 MG: 40 TABLET, FILM COATED ORAL at 20:27

## 2025-05-21 RX ADMIN — FOLIC ACID 1000 MCG: 1 TABLET ORAL at 15:06

## 2025-05-21 RX ADMIN — GABAPENTIN 100 MG: 100 CAPSULE ORAL at 08:54

## 2025-05-21 RX ADMIN — MIRTAZAPINE 15 MG: 15 TABLET, ORALLY DISINTEGRATING ORAL at 22:00

## 2025-05-21 RX ADMIN — SODIUM CHLORIDE 40 MG: 9 INJECTION INTRAMUSCULAR; INTRAVENOUS; SUBCUTANEOUS at 20:27

## 2025-05-21 RX ADMIN — PROPOFOL 60 MG: 10 INJECTION, EMULSION INTRAVENOUS at 13:25

## 2025-05-21 RX ADMIN — LIDOCAINE HYDROCHLORIDE 100 MG: 20 INJECTION, SOLUTION EPIDURAL; INFILTRATION; INTRACAUDAL; PERINEURAL at 13:24

## 2025-05-21 RX ADMIN — SODIUM CHLORIDE: 900 INJECTION, SOLUTION INTRAVENOUS at 13:24

## 2025-05-21 RX ADMIN — PROCHLORPERAZINE EDISYLATE 10 MG: 5 INJECTION INTRAMUSCULAR; INTRAVENOUS at 06:00

## 2025-05-21 RX ADMIN — SODIUM CHLORIDE: 9 INJECTION, SOLUTION INTRAVENOUS at 08:51

## 2025-05-21 RX ADMIN — FENTANYL CITRATE 25 MCG: 50 INJECTION INTRAMUSCULAR; INTRAVENOUS at 02:50

## 2025-05-21 RX ADMIN — METOPROLOL TARTRATE 12.5 MG: 25 TABLET, FILM COATED ORAL at 20:29

## 2025-05-21 RX ADMIN — METOPROLOL TARTRATE 12.5 MG: 25 TABLET, FILM COATED ORAL at 08:54

## 2025-05-21 RX ADMIN — SODIUM CHLORIDE, PRESERVATIVE FREE 10 ML: 5 INJECTION INTRAVENOUS at 08:54

## 2025-05-21 RX ADMIN — GLYCOPYRROLATE 0.2 MG: 0.2 INJECTION INTRAMUSCULAR; INTRAVENOUS at 13:23

## 2025-05-21 RX ADMIN — LACTULOSE 30 G: 20 SOLUTION ORAL at 14:59

## 2025-05-21 RX ADMIN — SUCRALFATE 1 G: 1 TABLET ORAL at 20:28

## 2025-05-21 RX ADMIN — FERROUS SULFATE TAB 325 MG (65 MG ELEMENTAL FE) 325 MG: 325 (65 FE) TAB at 20:27

## 2025-05-21 RX ADMIN — Medication 3 MG: at 20:27

## 2025-05-21 ASSESSMENT — PAIN - FUNCTIONAL ASSESSMENT
PAIN_FUNCTIONAL_ASSESSMENT: NONE - DENIES PAIN
PAIN_FUNCTIONAL_ASSESSMENT: ACTIVITIES ARE NOT PREVENTED

## 2025-05-21 ASSESSMENT — PAIN SCALES - GENERAL
PAINLEVEL_OUTOF10: 0
PAINLEVEL_OUTOF10: 9
PAINLEVEL_OUTOF10: 7
PAINLEVEL_OUTOF10: 0

## 2025-05-21 ASSESSMENT — PAIN DESCRIPTION - ORIENTATION: ORIENTATION: RIGHT;LEFT

## 2025-05-21 ASSESSMENT — PAIN SCALES - WONG BAKER: WONGBAKER_NUMERICALRESPONSE: NO HURT

## 2025-05-21 ASSESSMENT — ENCOUNTER SYMPTOMS: SHORTNESS OF BREATH: 1

## 2025-05-21 ASSESSMENT — PAIN DESCRIPTION - LOCATION
LOCATION: HEAD
LOCATION: HAND

## 2025-05-21 ASSESSMENT — PAIN DESCRIPTION - ONSET: ONSET: ON-GOING

## 2025-05-21 ASSESSMENT — PAIN DESCRIPTION - DESCRIPTORS
DESCRIPTORS: OTHER (COMMENT)
DESCRIPTORS: ACHING

## 2025-05-21 ASSESSMENT — PAIN DESCRIPTION - FREQUENCY: FREQUENCY: INTERMITTENT

## 2025-05-21 ASSESSMENT — PAIN DESCRIPTION - PAIN TYPE: TYPE: CHRONIC PAIN

## 2025-05-21 NOTE — CONSULTS
[place] [facility]    GASTROENTEROLOGY CONSULT    Patient:   Frank Lisa   :    1960   Facility:   [facility]  Date:    2025  Admission Dx:  Symptomatic anemia [D64.9]  Anemia, unspecified type [D64.9]  Chest pain, unspecified type [R07.9]  Requesting physician: Jaziel Cerda MD  Reason for consult:  anemia      SUBJECTIVE:  History of Present Illness:  This is a 64 y.o.   male who was admitted 2025 with Symptomatic anemia [D64.9]  Anemia, unspecified type [D64.9]  Chest pain, unspecified type [R07.9]. We have been asked to see the patient in consultation by Jaziel Cerda MD for  anemia. This is a 64 year old male with pmh of cirrhosis secondary to EtOH abuse complicated by EV, recurrent ascites status post TIPS, TALA, hepatitis C treated with Harvoni, GERD, esophageal cancer treated with chemoradiation, history of bowel perforation requiring right hemicolectomy with end ileostomy reversed on 10/10/2023 presents to ED for chest pain with shortness of breath and nausea for one day.  Was found to have hgb of 6.4>7.1 with one unit of blood. (Baseline around 9). He has not had melena hematemesis or hematochezia. Not on anticoagulation meds. Has not been taking iron replacement for \"a while\". C/o 25# weight gain with abdominal distention. No fever or abdominal pain. CT abd shows small amount of ascites with patent TIPS.he states he is supposed to have endoscopy next week. CEA 17.5 in      Endoscopy  Egd 24  Retropharyngeal area was grossly normal appearing     Esophagus: abnormal: severe esophagitis, LA-grade-D from 30 cm to 40 cm from incisors                          Esophagogastric markings: Diaphragmatic hiatus- 40 cm; GE junction- 40 cm; Squamo-columnar junction- 40 cm     Stomach:    Fundus: normal    Body: normal    Antrum: normal     Duodenum:     Descending: normal    Bulb: normal  22 colonoscopy (pangular)small hemorrhoids flat

## 2025-05-21 NOTE — H&P (VIEW-ONLY)
[place] [facility]    GASTROENTEROLOGY CONSULT    Patient:   Frank Lisa   :    1960   Facility:   [facility]  Date:    2025  Admission Dx:  Symptomatic anemia [D64.9]  Anemia, unspecified type [D64.9]  Chest pain, unspecified type [R07.9]  Requesting physician: Jaziel Cerda MD  Reason for consult:  anemia      SUBJECTIVE:  History of Present Illness:  This is a 64 y.o.   male who was admitted 2025 with Symptomatic anemia [D64.9]  Anemia, unspecified type [D64.9]  Chest pain, unspecified type [R07.9]. We have been asked to see the patient in consultation by Jaziel Cerda MD for  anemia. This is a 64 year old male with pmh of cirrhosis secondary to EtOH abuse complicated by EV, recurrent ascites status post TIPS, TALA, hepatitis C treated with Harvoni, GERD, esophageal cancer treated with chemoradiation, history of bowel perforation requiring right hemicolectomy with end ileostomy reversed on 10/10/2023 presents to ED for chest pain with shortness of breath and nausea for one day.  Was found to have hgb of 6.4>7.1 with one unit of blood. (Baseline around 9). He has not had melena hematemesis or hematochezia. Not on anticoagulation meds. Has not been taking iron replacement for \"a while\". C/o 25# weight gain with abdominal distention. No fever or abdominal pain. CT abd shows small amount of ascites with patent TIPS.he states he is supposed to have endoscopy next week. CEA 17.5 in      Endoscopy  Egd 24  Retropharyngeal area was grossly normal appearing     Esophagus: abnormal: severe esophagitis, LA-grade-D from 30 cm to 40 cm from incisors                          Esophagogastric markings: Diaphragmatic hiatus- 40 cm; GE junction- 40 cm; Squamo-columnar junction- 40 cm     Stomach:    Fundus: normal    Body: normal    Antrum: normal     Duodenum:     Descending: normal    Bulb: normal  22 colonoscopy (pangular)small hemorrhoids flat

## 2025-05-21 NOTE — H&P
[facility] Internal Medicine   Jaziel Cerda MD; MD Kathi Diego MD, MD , Shahzad Hernandez MD    [facility] Internal Medicine   IN-PATIENT SERVICE   [facility]    HISTORY AND PHYSICAL EXAMINATION            Date:   5/21/2025  Patient name:  Frank Lisa  Date of admission:  5/20/2025  3:49 PM  MRN:   [id number]  Account:  [id number]  YOB: 1960  PCP:    Lopez Rosario PA  Room:   2081/2081-01  Code Status:    Full Code    Chief Complaint:     Chief Complaint   Patient presents with    Chest Pain     Sudden onset last night with SOB, nausea and diaphoresis  Increased WOB observed,   Radiation to left arm, feels like he's having a heart attack   Dyspnea  fatigue    History Obtained From:     Pt medical record and nursing staff    History of Present Illness:     Frank Lisa is a 64 y.o. Non- / non  male who presents with Chest Pain (Sudden onset last night with SOB, nausea and diaphoresis/Increased WOB observed, /Radiation to left arm, feels like he's having a heart attack)   and is admitted to the hospital for the management of Symptomatic anemia.    Frank Lisa is a 64 y.o. Non- / non  male who presents with Chest Pain (Sudden onset last night with SOB, nausea and diaphoresis/Increased WOB observed, /Radiation to left arm, feels like he's having a heart attack) and is admitted to the hospital for the management of Symptomatic anemia. Medical history significant for cirrhosis s/p tips procedure, hx of GI bleed, esophageal varices, hx of hepatitis C, hx of alcohol abuse, COPD, A-Fib with RVR s/p cardioversion April 2024, hx of cecal adenocarcinoma s/p radiation.      Patient presented to ED complaining of an episode of chest pain yesterday. States that chest pain spontaneously resolved but he has had ongoing shortness of breath, nausea and intermittent diaphoresis. Found to have hgb of  polyps-pathology tubular adenoma, and abnormal looking mucosa right colon-pathology-tubular adenoma    COLONOSCOPY N/A 03/30/2018    COLONOSCOPY POLYPECTOMY COLD BIOPSY performed by Augusto Cordova MD at [facility] OR    COLONOSCOPY  03/30/2018    Small polyp in the sigmoid colon and excised with biopsy forceps--tubular adenoma    COLONOSCOPY N/A 04/16/2022    COLONOSCOPY POLYPECTOMY performed by Augusto Cordova MD at [facility] OR    COLOSTOMY      later reversed    CYSTOSCOPY N/A 07/01/2024    CYSTOSCOPY performed by Edinson Griffith MD at [facility] OR    ENDOSCOPY, COLON, DIAGNOSTIC      EGD    ESOPHAGOGASTRODUODENOSCOPY  12/29/2022    ESOPHAGOGASTRODUODENOSCOPY N/A 01/03/2023    IR PORT PLACEMENT > 5 YEARS  04/19/2021    IR PORT PLACEMENT EQUAL OR GREATER THAN 5 YEARS 4/19/2021 [facility] SPECIAL PROCEDURES    IR TIPS INSERTION  12/01/2022    IR TIPS INSERTION 12/1/2022 Scar VELEZ MD [facility] SPECIAL PROCEDURES    KNEE SURGERY Left     cyst removed    LAPAROTOMY N/A 03/25/2023    LAPAROTOMY EXPLORATORY, RIGHT COLECTOMY, CREATION IF END ILEOSTOMY performed by Kvng Waddell DO at [facility] OR    NASAL SEPTUM SURGERY      NASOTRACHEAL SUCTIONING  10/18/2023    NERVE BLOCK Right 11/23/2020    NERVE BLOCK RIGHT CERVICAL STEROID INJECTION  C3-C6 performed by Dheeraj Lau MD at [facility] OR    OTHER SURGICAL HISTORY  01/04/2016    steroid injection C7 T1    OTHER SURGICAL HISTORY  11/21/2016    Bilateral Lumbar CACHORRO L4-L5 injections    OTHER SURGICAL HISTORY  12/19/2016    lumbar steroid injection    OTHER SURGICAL HISTORY  09/28/2018    BILATERAL L5 CACHORRO (N/A Back)    OTHER SURGICAL HISTORY Right 11/23/2020    cervical injection    PAIN MANAGEMENT PROCEDURE Left 07/09/2020    EPIDURAL STEROID INJECTION LEFT L4 L5 performed by Dheeraj Lau MD at [facility] OR    PAIN MANAGEMENT PROCEDURE Left 07/20/2020    LEFT L4 L5 EPIDURAL STEROID INJECTION performed by Dheeraj Lau MD at [facility] OR    PAIN MANAGEMENT PROCEDURE Bilateral 08/17/2020  negative for swelling/edema   ALLERGIC/IMMUNOLOGIC:  negative for urticaria , itching  ENDOCRINE:  negative increase in drinking, increase in urination, hot or cold intolerance  MUSCULOSKELETAL:  negative joint pains, muscle aches, swelling of joints  NEUROLOGICAL:  negative for headaches, dizziness, lightheadedness, numbness, pain, tingling extremities  BEHAVIOR/PSYCH:  negative for depression, anxiety    Physical Exam:   /60   Pulse 75   Temp 97.5 °F (36.4 °C) (Oral)   Resp 17   Ht 1.778 m (5' 10\")   Wt 96.2 kg (212 lb)   SpO2 97%   BMI 30.42 kg/m²   Temp (24hrs), Av.2 °F (36.8 °C), Min:97.3 °F (36.3 °C), Max:98.6 °F (37 °C)    No results for input(s): \"POCGLU\" in the last 72 hours.    Intake/Output Summary (Last 24 hours) at 2025 1127  Last data filed at 2025 0854  Gross per 24 hour   Intake 947.76 ml   Output 1625 ml   Net -677.24 ml     Midline laparotomy scar noted  General Appearance: alert, well appearing, and in no acute distress  Mental status: oriented to person, place, and time  Head: normocephalic, atraumatic  Eye: no icterus, redness, pupils equal and reactive, extraocular eye movements intact, conjunctiva clear  Ear: normal external ear, no discharge, hearing intact  Nose: no drainage noted  Mouth: mucous membranes moist  Neck: supple, no carotid bruits, thyroid not palpable  Lungs: Bilateral equal air entry, clear to ausculation, no wheezing, rales or rhonchi, normal effort  Cardiovascular: normal rate, regular rhythm, no murmur, gallop, rub  Abdomen: Soft, nontender, nondistended, normal bowel sounds, no hepatomegaly or splenomegaly  Neurologic: There are no new focal motor or sensory deficits, normal muscle tone and bulk, no abnormal sensation, normal speech, cranial nerves II through XII grossly intact  Skin: No gross lesions, rashes, bruising or bleeding on exposed skin area  Extremities: peripheral pulses palpable, no pedal edema or calf pain with palpation  Psych:

## 2025-05-21 NOTE — PROGRESS NOTES
[facility]   OCCUPATIONAL THERAPY MISSED TREATMENT NOTE   INPATIENT   Date: 25  Patient Name: Frank Lisa       Room: [facility] ENDO Pool/NONE  MRN: [id number]   Account #: [id number]    : 1960  (64 y.o.)  Gender: male                 REASON FOR MISSED TREATMENT:  Patient declined   -    Pt requesting therapy attempt later due to tired and wanting to rest. Occupational therapy educated on the benefits of therapy services with pt continuing to decline at this time. Occupational therapy will attempt again if time allows.   [id number]    1410: Pt currently out of room for EGD. Occupational therapy will continue to follow      Electronically signed by STACEY Aguilar on 25 at 2:13 PM EDT'

## 2025-05-21 NOTE — ANESTHESIA PRE PROCEDURE
Department of Anesthesiology  Preprocedure Note       Name:  Frank Lisa   Age:  64 y.o.  :  1960                                          MRN:  [id number]         Date:  2025      Surgeon: Surgeon(s):  Dixon Olivia MD    Procedure: Procedure(s):  ESOPHAGOGASTRODUODENOSCOPY BIOPSY    Medications prior to admission:   Prior to Admission medications    Medication Sig Start Date End Date Taking? Authorizing Provider   lactulose (CHRONULAC) 10 GM/15ML solution Take 45 mLs by mouth in the morning, at noon, in the evening, and at bedtime 25 Yes Dixon Olivia MD   folic acid (FOLVITE) 1 MG tablet TAKE 1 TABLET BY MOUTH EVERY DAY 4/10/25  Yes Brianda Soni MD   gabapentin (NEURONTIN) 100 MG capsule Take 1 capsule by mouth 2 times daily for 180 days. Intended supply: 90 days 3/26/25 9/22/25 Yes Dixon Olivia MD   mirtazapine (REMERON SOL-TAB) 15 MG disintegrating tablet Take 1 tablet by mouth nightly 24  Yes Kathi Russo MD   atorvastatin (LIPITOR) 40 MG tablet Take 1 tablet by mouth nightly 10/6/24  Yes Jaziel Cerda MD   sucralfate (CARAFATE) 1 GM tablet Take 1 tablet by mouth 4 times daily (before meals and nightly) 24  Yes Jessika Abad MD   metoprolol tartrate (LOPRESSOR) 25 MG tablet Take 0.5 tablets by mouth 2 times daily 24  Yes Jessika Abad MD   DULoxetine (CYMBALTA) 30 MG extended release capsule Take 1 capsule by mouth daily 24  Yes Hamida Loaiza MD   polyethylene glycol-electrolytes (NULYTELY) 420 g solution Take as directed for bowel prep.  Patient not taking: Reported on 2025   Dixon Olivia MD   pantoprazole (PROTONIX) 40 MG tablet Take 1 tablet by mouth in the morning and at bedtime 24   Armen Adkins MD   vitamin D (ERGOCALCIFEROL) 1.25 MG (77662 UT) CAPS capsule Take 1 capsule by mouth once a week  Patient not taking: Reported on 2025 10/6/24   Jaziel Cerda MD   magnesium oxide (MAG-OX) 400 MG tablet

## 2025-05-21 NOTE — FLOWSHEET NOTE
05/21/25 0531   Treatment Team Notification   Reason for Communication Evaluate   Name of Team Member Notified Ericka Rushing NP   Treatment Team Role Advanced Practice Nurse   Method of Communication Secure Message   Response See orders   Notification Time 0533       Patient is requesting nausea medication. No prn orders.   See new orders per Ericka Rushing NP.

## 2025-05-21 NOTE — DISCHARGE INSTR - COC
Continuity of Care Form    Patient Name: Frank Lisa   :  1960  MRN:  [id number]    Admit date:  2025  Discharge date:  2025    Code Status Order: Full Code   Advance Directives:    Date/Time Healthcare Directive Type of Healthcare Directive Copy in Chart Healthcare Agent Appointed Healthcare Agent's Name Healthcare Agent's Phone Number    25 1226 Yes, patient has an advance directive for healthcare treatment  Living will;Durable power of  for health care  --  --  --  --             Admitting Physician:  Jaziel Cerda MD  PCP: Lopez Rosario PA    Discharging Nurse: Rosa Maria  [facility] Unit/Room#:   Discharging Unit Phone Number: [phone]    Emergency Contact:   Extended Emergency Contact Information  Primary Emergency Contact: Maria LNilam  Address: [address] [place] [address]  Home Phone: [phone]  Mobile Phone: [phone]  Relation: Ex-Spouse  Secondary Emergency Contact: Nilam Houston  Address: [address] [place] [address]  Home Phone: [phone]  Mobile Phone: [phone]  Relation: Ex-Spouse    Past Surgical History:  Past Surgical History:   Procedure Laterality Date    APPENDECTOMY      BUNIONECTOMY      twice on right side    BUNIONECTOMY Left     CARPAL TUNNEL RELEASE Right     COLON SURGERY  10/10/2023    EXPLORATORY LAPAROTOMY, REVERSAL ILEOSTOMY WITH ILEOCOLOSTOMY, LYSIS OF ADHESIONS,    COLONOSCOPY      at age 39    COLONOSCOPY  10/05/2016    polyps-pathology tubular adenoma, and abnormal looking mucosa right colon-pathology-tubular adenoma    COLONOSCOPY N/A 2018    COLONOSCOPY POLYPECTOMY COLD BIOPSY performed by Augusto Cordova MD at [facility] OR    COLONOSCOPY  2018    Small polyp in the sigmoid colon and excised with biopsy forceps--tubular adenoma    COLONOSCOPY N/A 2022    COLONOSCOPY POLYPECTOMY performed by Augusto Cordova MD at [facility]

## 2025-05-21 NOTE — PROGRESS NOTES
[facility] Internal Medicine  Jaziel Cerda MD; Roberto Workman MD; Alexia Tobar MD;  Kathi Russo MD; Toño Hernandez MD  [facility] Internal Medicine   IN-PATIENT SERVICE  [facility]                 Date:   5/20/2025  Patientname:  Frank Lisa  Date of admission:  5/20/2025  3:49 PM  MRN:   [id number]  Account:  [id number]  YOB: 1960  PCP:    Lopez Rosario PA  Room:   10/10  Code Status:    Full Code      Chief Complaint:     Chief Complaint   Patient presents with    Chest Pain     Sudden onset last night with SOB, nausea and diaphoresis  Increased WOB observed,   Radiation to left arm, feels like he's having a heart attack       History of Present Illness:     Frank Lisa is a 64 y.o. Non- / non  male who presents with Chest Pain (Sudden onset last night with SOB, nausea and diaphoresis/Increased WOB observed, /Radiation to left arm, feels like he's having a heart attack) and is admitted to the hospital for the management of Symptomatic anemia. Medical history significant for cirrhosis s/p tips procedure, hx of GI bleed, esophageal varices, hx of hepatitis C, hx of alcohol abuse, COPD, A-Fib with RVR s/p cardioversion April 2024, hx of cecal adenocarcinoma s/p radiation.     Patient presented to ED complaining of an episode of chest pain yesterday. States that chest pain spontaneously resolved but he has had ongoing shortness of breath, nausea and intermittent diaphoresis. Found to have hgb of 6.4. He does have a history of esophageal varices and GI bleed. Stool for OB negative. Started on IV protonix bolus and transfused 1 unit PRBC. CT abdomen pelvis shows cirrhosis with patent TIPS. Cholelithiasis and small volume ascites. CTA chest negative for pulmonary embolism, cardiomegaly with small pericardial effusion, small right pleural effusion, CANDELARIO nodule 8.9 x 8.5 mm. Normal LFTs despite hx of cirrhosis. States that he is no longer drinking  performed by Lucian Harley MD at [facility] OR    UPPER GASTROINTESTINAL ENDOSCOPY N/A 06/06/2022    EGD BAND LIGATION performed by Bi Dawkins MD at [facility]    UPPER GASTROINTESTINAL ENDOSCOPY N/A 06/09/2022    EGD ESOPHAGOGASTRODUODENOSCOPY performed by Bi Dawkins MD at [facility] ENDO    UPPER GASTROINTESTINAL ENDOSCOPY N/A 09/14/2022    EGD ESOPHAGOGASTRODUODENOSCOPY ENDOSCOPIC APC AT GE JUNCTION performed by Jose Mckenzie MD at [facility]    UPPER GASTROINTESTINAL ENDOSCOPY N/A 10/19/2022    EGD BIOPSY performed by Augusto Cordova MD at [facility]    UPPER GASTROINTESTINAL ENDOSCOPY N/A 10/31/2022    EGD with APC performed by Augusto Cordova MD at [facility]    UPPER GASTROINTESTINAL ENDOSCOPY  12/29/2022    EGD ESOPHAGOGASTRODUODENOSCOPY performed by Yo Santos MD at [facility] OR    UPPER GASTROINTESTINAL ENDOSCOPY N/A 01/03/2023    EGD ESOPHAGOGASTRODUODENOSCOPY performed by Arsen Russo MD at [facility] OR    UPPER GASTROINTESTINAL ENDOSCOPY N/A 01/26/2023    EGD CONTROL HEMORRHAGE performed by Augusto Cordova MD at [facility]    UPPER GASTROINTESTINAL ENDOSCOPY N/A 02/13/2023    EGD WITH APC GOLD PROBE performed by Augusto Cordova MD at [facility]    UPPER GASTROINTESTINAL ENDOSCOPY N/A 03/17/2023    EGD WITH RESOLUTION CLIP APPLICATION X3 performed by Lucian Harley MD at [facility]    UPPER GASTROINTESTINAL ENDOSCOPY N/A 08/02/2023    EGD WITH APC TO SMALL BOWEL AVM AND RESOLUTION CLIP APPLIED TO GE JUNCTION performed by Dixon Olivia MD at [facility] ENDO    UPPER GASTROINTESTINAL ENDOSCOPY N/A 01/09/2024    EGD BIOPSY ESOPHAGOGASTRODUODENOSCOPY PEG TUBE INSERTION performed by Kole Guo MD at [facility]    UPPER GASTROINTESTINAL ENDOSCOPY N/A 05/10/2024    EGD BIOPSY WITH REMOVAL PERCUTANEOUS ENDOSCOPIC GASTROSTOMY TUBE performed by Kole Guo MD at [facility]    UPPER GASTROINTESTINAL ENDOSCOPY N/A 06/26/2024    ESOPHAGOGASTRODUODENOSCOPY performed by Kole Guo MD at [facility]    VENTRAL

## 2025-05-21 NOTE — ANESTHESIA POSTPROCEDURE EVALUATION
Department of Anesthesiology  Postprocedure Note    Patient: Frank Lisa  MRN: [id number]  YOB: 1960  Date of evaluation: 5/21/2025    Procedure Summary       Date: 05/21/25 Room / Location: [place] [address] / [facility]    Anesthesia Start: 1319 Anesthesia Stop: 1342    Procedure: ESOPHAGOGASTRODUODENOSCOPY BIOPSY, esophogeal banding x4 (Esophagus) Diagnosis:       Iron deficiency anemia, unspecified iron deficiency anemia type      (Iron deficiency anemia, unspecified iron deficiency anemia type [D50.9])    Surgeons: Dixon Olivia MD Responsible Provider: Hermelindo Holt MD    Anesthesia Type: general, TIVA ASA Status: 3            Anesthesia Type: No value filed.    Yen Phase I: Yen Score: 10    Yen Phase II: Yen Score: 10    Anesthesia Post Evaluation    Comments: POST- ANESTHESIA EVALUATION       Pt Name: Frank Lisa  MRN: [id number]  YOB: 1960  Date of evaluation: 5/21/2025  Time:  4:26 PM      BP (!) 130/54   Pulse 73   Temp 98.4 °F (36.9 °C) (Oral)   Resp 18   Ht 1.778 m (5' 10\")   Wt 96.2 kg (212 lb)   SpO2 100%   BMI 30.42 kg/m²      Consciousness Level  Awake  Cardiopulmonary Status  Stable  Pain Adequately Treated YES  Nausea / Vomiting  NO  Adequate Hydration  YES  Anesthesia Related Complications NONE      Electronically signed by Hermelindo Holt MD on 5/21/2025 at 4:26 PM           No notable events documented.

## 2025-05-21 NOTE — FLOWSHEET NOTE
05/21/25 0229   Treatment Team Notification   Reason for Communication Evaluate   Name of Team Member Notified Ericka Rushing NP   Treatment Team Role Advanced Practice Nurse   Method of Communication Secure Message   Response See orders   Notification Time 0233       Pt is complaining of BUE neuropathy pain 9/10. See new orders per Ericka Rushing NP.

## 2025-05-21 NOTE — H&P (VIEW-ONLY)
vitamin D (ERGOCALCIFEROL) 1.25 MG (55474 UT) CAPS capsule Take 1 capsule by mouth once a week  Patient not taking: Reported on 5/20/2025 10/6/24   Jaziel Cerda MD   magnesium oxide (MAG-OX) 400 MG tablet Take 1 tablet by mouth daily 8/6/24   Lopez Rosario PA   Elastic Bandages & Supports (MEDICAL COMPRESSION STOCKINGS) MISC 2 each by Does not apply route daily Right and left knee high compression stockings.  30-40 mmHg.  To be worn continuously throughout the day. 6/13/24   Lopez Rosario PA   ferrous sulfate (IRON 325) 325 (65 Fe) MG tablet Take 1 tablet by mouth in the morning and at bedtime    Provider, Historical, MD   Handicap Placard MISC by Does not apply route Exp: 5-14-26 5/14/24   Lopez Rosario PA        Allergies:     Patient has no known allergies.    Social History:     Tobacco:    reports that he quit smoking about 8 years ago. His smoking use included cigarettes. He started smoking about 53 years ago. He has a 45 pack-year smoking history. He has never been exposed to tobacco smoke. He has never used smokeless tobacco.  Alcohol:      reports current alcohol use.  Drug Use:  reports current drug use. Frequency: 1.00 time per week. Drug: Marijuana (Weed).    Family History:     Family History   Problem Relation Age of Onset    Cancer Mother         pancreatic    Cancer Father         bone    Diabetes Sister     Asthma Brother     Allergies Brother         MULTIPLE       Review of Systems:     Positive and Negative as described in HPI.    CONSTITUTIONAL:  negative for fevers, chills, sweats, fatigue, weight loss  HEENT:  negative for vision, hearing changes, runny nose, throat pain  RESPIRATORY: Exertional dyspnea CARDIOVASCULAR:  negative for chest pain, palpitations  GASTROINTESTINAL: Dysphagia and chest pain GENITOURINARY:  negative for difficulty of urination, burning with urination, frequency   INTEGUMENT:  negative for rash, skin lesions, easy bruising   HEMATOLOGIC/LYMPHATIC:   TIPS procedure  History of esophageal high-grade dysplasia in 2021 EGD negative for malignancy since then  History of right hemicolon mesenteric ischemia requiring right hemicolectomy end-to-end anastomosis now  History of anemia secondary liver disease alcohol abuse related bone marrow suppression admitted with some dysphagia chest pain atypical and also has dyspnea on exertion at rest and some orthopnea  Severe anemia hemoglobin 6.4 no overt GI bleed patient is noncompliant with iron supplement  IV Protonix 1 unit of PRBC transfused  GI consult will need repeat EGD  Multiple comorbid condition high risk of mortality and morbidity        Consultations:   IP CONSULT TO GI     Patient is admitted as inpatient status because of co-morbidities listed above, severity of signs and symptoms as outlined, requirement for current medical therapies and most importantly because of direct risk to patient if care not provided in a hospital setting.  Expected length of stay > 48 hours.    Jaziel Creda MD  5/21/2025  11:27 AM    Copy sent to Lopez Luke, PA    Please note that this chart was generated using voice recognition Dragon dictation software.  Although every effort was made to ensure the accuracy of this automated transcription, some errors in transcription may have occurred.

## 2025-05-21 NOTE — PROGRESS NOTES
Pharmacy Medication History Note      List of current medications patient is taking is complete.    Source of information: [employer] Scripts, [place], Care Everywhere     Changes made to medication list:  Medications removed (include reason, ex. therapy complete or physician discontinued, noncompliance):  None     Medications flagged for provider review:  Ergocalciferol - last filled in October 2024 for 84 days   Nulytely - bowel prep only    Medications added/doses adjusted:  None     Other notes (ex. Recent course of antibiotics, Coumadin dosing):  Per [place]ARRS, last fill of gabapentin was on 3/28/25 with quantity 180 for 90 days.       Current Home Medication List at Time of Admission:  Prior to Admission medications    Medication Sig   lactulose (CHRONULAC) 10 GM/15ML solution Take 45 mLs by mouth in the morning, at noon, in the evening, and at bedtime   folic acid (FOLVITE) 1 MG tablet TAKE 1 TABLET BY MOUTH EVERY DAY   gabapentin (NEURONTIN) 100 MG capsule Take 1 capsule by mouth 2 times daily for 180 days. Intended supply: 90 days   mirtazapine (REMERON SOL-TAB) 15 MG disintegrating tablet Take 1 tablet by mouth nightly   atorvastatin (LIPITOR) 40 MG tablet Take 1 tablet by mouth nightly   sucralfate (CARAFATE) 1 GM tablet Take 1 tablet by mouth 4 times daily (before meals and nightly)   metoprolol tartrate (LOPRESSOR) 25 MG tablet Take 0.5 tablets by mouth 2 times daily   DULoxetine (CYMBALTA) 30 MG extended release capsule Take 1 capsule by mouth daily   polyethylene glycol-electrolytes (NULYTELY) 420 g solution Take as directed for bowel prep.  Patient not taking: Reported on 5/20/2025   pantoprazole (PROTONIX) 40 MG tablet Take 1 tablet by mouth in the morning and at bedtime   vitamin D (ERGOCALCIFEROL) 1.25 MG (35054 UT) CAPS capsule Take 1 capsule by mouth once a week  Patient not taking: Reported on 5/20/25   magnesium oxide (MAG-OX) 400 MG tablet Take 1 tablet by mouth daily   ferrous sulfate (IRON 325) 325 (65

## 2025-05-21 NOTE — PROGRESS NOTES
Physical Therapy        Physical Therapy Cancel Note      DATE: 2025    NAME: Frank Lisa  MRN: [id number]   : 1960      Patient not seen this date for Physical Therapy due to:    Testing: pt out of room 1408. Will continue to follow      Electronically signed by Kate Dove PT on 2025 at 2:12 PM

## 2025-05-21 NOTE — PROGRESS NOTES
Pt arrived to floor via  wheelchair  from ED and was transfered to bed.  Vitals taken, telemetry applied. Admission and assessment to follow. No distress noted. See doc flowsheet and admission navigator for details. POC and education initiated and reviewed with patient. Call light within reach, and pt educated on its use. Bed in lowest position, and locked. Side rails up x 2. Denied further questions or needs at this time. Plan of care on going.

## 2025-05-21 NOTE — PLAN OF CARE
Problem: ABCDS Injury Assessment  Goal: Absence of physical injury  Outcome: Progressing     Problem: Safety - Adult  Goal: Free from fall injury  5/21/2025 1528 by Loraine Paez RN  Outcome: Progressing     Problem: Discharge Planning  Goal: Discharge to home or other facility with appropriate resources  Outcome: Progressing     Problem: Pain  Goal: Verbalizes/displays adequate comfort level or baseline comfort level  5/21/2025 1528 by Loraine Paez RN  Outcome: Progressing  Flowsheets (Taken 5/21/2025 0350 by Kamilah Brito RN)  Verbalizes/displays adequate comfort level or baseline comfort level: Assess pain using appropriate pain scale     Problem: Metabolic/Fluid and Electrolytes - Adult  Goal: Electrolytes maintained within normal limits  5/21/2025 1528 by Loraine Paez RN  Outcome: Progressing     Problem: Hematologic - Adult  Goal: Maintains hematologic stability  Outcome: Progressing     Problem: Chronic Conditions and Co-morbidities  Goal: Patient's chronic conditions and co-morbidity symptoms are monitored and maintained or improved  5/21/2025 1528 by Loraine Paez, RN  Outcome: Progressing

## 2025-05-21 NOTE — ACP (ADVANCE CARE PLANNING)
Advance Care Planning     Advance Care Planning Activator (Inpatient)  Conversation Note      Date of ACP Conversation: 5/21/2025     Conversation Conducted with: Patient with Decision Making Capacity    ACP Activator: Sharee Anaya RN    Health Care Decision Maker:     Current Designated Health Care Decision Maker:     Primary Decision Maker: Nilam Lisa - Ex-Spouse - [phone]  Click here to complete Healthcare Decision Makers including section of the Healthcare Decision Maker Relationship (ie \"Primary\")  Today we documented Decision Maker(s) consistent with Legal Next of Kin hierarchy.    Care Preferences    Ventilation:  \"If you were in your present state of health and suddenly became very ill and were unable to breathe on your own, what would your preference be about the use of a ventilator (breathing machine) if it were available to you?\"      Would the patient desire the use of ventilator (breathing machine)?: yes    \"If your health worsens and it becomes clear that your chance of recovery is unlikely, what would your preference be about the use of a ventilator (breathing machine) if it were available to you?\"     Would the patient desire the use of ventilator (breathing machine)?: No      Resuscitation  \"CPR works best to restart the heart when there is a sudden event, like a heart attack, in someone who is otherwise healthy. Unfortunately, CPR does not typically restart the heart for people who have serious health conditions or who are very sick.\"    \"In the event your heart stopped as a result of an underlying serious health condition, would you want attempts to be made to restart your heart (answer \"yes\" for attempt to resuscitate) or would you prefer a natural death (answer \"no\" for do not attempt to resuscitate)?\" yes       [] Yes   [x] No   Educated Patient / Decision Maker regarding differences between Advance Directives and portable DNR orders.    Length of ACP Conversation in minutes:

## 2025-05-21 NOTE — CARE COORDINATION
Case Management Assessment  Initial Evaluation    Date/Time of Evaluation: 5/21/2025 9:47 AM  Assessment Completed by: Sharee Anaya RN    If patient is discharged prior to next notation, then this note serves as note for discharge by case management.    Patient Name: Frank Lisa                   YOB: 1960  Diagnosis: Symptomatic anemia [D64.9]  Anemia, unspecified type [D64.9]  Chest pain, unspecified type [R07.9]                   Date / Time: 5/20/2025  3:49 PM    Patient Admission Status: Inpatient   Readmission Risk (Low < 19, Mod (19-27), High > 27): Readmission Risk Score: 24.4    Current PCP: Lopez Rosario PA  PCP verified by CM? Yes    Chart Reviewed: Yes      History Provided by: Patient  Patient Orientation: Alert and Oriented    Patient Cognition: Alert    Hospitalization in the last 30 days (Readmission):  No    If yes, Readmission Assessment in CM Navigator will be completed.    Advance Directives:      Code Status: Full Code   Patient's Primary Decision Maker is: Named in Scanned ACP Document    Primary Decision Maker: Nilam Lisa - Ex-Spouse - [phone]    Discharge Planning:    Patient lives with: Alone Type of Home: House  Primary Care Giver: Self  Patient Support Systems include: Family Members   Current Financial resources: Medicare, Medicaid  Current community resources: None  Current services prior to admission: Durable Medical Equipment, Home Care            Current DME: Walker, Cane, Shower Chair, Other (Comment) (GB)            Type of Home Care services:  Nursing Services, OT, PT    ADLS  Prior functional level: Independent in ADLs/IADLs  Current functional level: Independent in ADLs/IADLs    PT AM-PAC:   /24  OT AM-PAC:   /24    Family can provide assistance at DC: No  Would you like Case Management to discuss the discharge plan with any other family members/significant others, and if so, who? Yes (Ex-wife, Nilam)  Plans to Return to Present Housing:

## 2025-05-21 NOTE — PLAN OF CARE
Problem: Safety - Adult  Goal: Free from fall injury  Outcome: Progressing   Note: No falls noted this shift. Patient ambulates with x1 staff assistance without difficulty.  Bed kept in low position. Safe environment maintained. Bedside table & call light in reach. Uses call light appropriately when needing assistance.  Hourly rounding in place.    Problem: Pain  Goal: Verbalizes/displays adequate comfort level or baseline comfort level  Outcome: Progressing   Note: Pt medicated with pain medication prn.  Assessed all pain characteristics including level, type, location, frequency, and onset.  Non-pharmacologic interventions offered to pt as well.  Pt states pain is tolerable at this time.     Problem: Metabolic/Fluid and Electrolytes - Adult  Goal: Electrolytes maintained within normal limits  Outcome: Progressing     Problem: Chronic Conditions and Co-morbidities  Goal: Patient's chronic conditions and co-morbidity symptoms are monitored and maintained or improved  Outcome: Progressing

## 2025-05-21 NOTE — OP NOTE
EGD report    Esophagogastroduodenoscopy (EGD) Procedure Note    Procedure:  EGD with biopsies variceal band ligation esophagus    Procedure Date: 5/21/2025    Indications: Esophageal varices, previous TIPS, acute on chronic anemia    Sedation:  MAC    Attending Physician:  Dr. Dixon Olivia MD    Assistant:  None    Procedure Details:    Informed consent was obtained for the procedure, including sedation. Risks of infection, perforation, hemorrhage, adverse drug reaction, and aspiration were discussed. The patient was placed in the left lateral decubitus position. The patient was monitored continuously with ECG tracing, pulse oximetry, blood pressure monitoring, and direct observation.      The gastroscope was inserted into the mouth and advanced under direct vision to second portion of the duodenum.  A careful inspection was made as the gastroscope was withdrawn, including a retroflexed view of the proximal stomach; findings and interventions are described below. Appropriate photodocumentation was obtained.    Findings:  Retropharyngeal area was grossly normal appearing     Esophagus:   Abnormal salmon-colored mucosa noted extending 3 to 4 cm above, biopsies not obtained as there was evidence of varices.  Multiple columns of large esophageal varices noted with red johnathan sign.  4 bands successfully placed  2 to 3 cm hiatal hernia noted.       Stomach:    Fundus: Possible varix on retroflexion view (GOV2 vs IGV1)  Moderate portal hypertensive gastropathy    Body: normal    Antrum: normal  Biopsies obtained to rule out H. pylori     Duodenum:     Bulb: normal    First part: Normal    Second Part: Normal  Biopsies obtained to rule out celiac    Complications:  None           Estimated blood loss:  Minimal    Disposition:  [facility]           Condition: Stable    Specimen Removed: Stomach, duodenum    Impression:    Multiple columns of large esophageal varices, red johnathan sign, 4 band successfully placed with

## 2025-05-22 VITALS
WEIGHT: 229.06 LBS | HEART RATE: 85 BPM | OXYGEN SATURATION: 98 % | RESPIRATION RATE: 18 BRPM | DIASTOLIC BLOOD PRESSURE: 75 MMHG | HEIGHT: 70 IN | SYSTOLIC BLOOD PRESSURE: 136 MMHG | TEMPERATURE: 97.9 F | BODY MASS INDEX: 32.79 KG/M2

## 2025-05-22 LAB
ABO/RH: NORMAL
ALBUMIN SERPL-MCNC: 2.4 G/DL (ref 3.5–5.2)
ALP SERPL-CCNC: 104 U/L (ref 40–129)
ALT SERPL-CCNC: 11 U/L (ref 10–50)
ANION GAP SERPL CALCULATED.3IONS-SCNC: 8 MMOL/L (ref 9–16)
ANTIBODY SCREEN: NEGATIVE
ARM BAND NUMBER: NORMAL
AST SERPL-CCNC: 32 U/L (ref 10–50)
BILIRUB SERPL-MCNC: 0.9 MG/DL (ref 0–1.2)
BLOOD BANK BLOOD PRODUCT EXPIRATION DATE: NORMAL
BLOOD BANK DISPENSE STATUS: NORMAL
BLOOD BANK ISBT PRODUCT BLOOD TYPE: 5100
BLOOD BANK PRODUCT CODE: NORMAL
BLOOD BANK SAMPLE EXPIRATION: NORMAL
BLOOD BANK UNIT TYPE AND RH: NORMAL
BPU ID: NORMAL
BUN SERPL-MCNC: 10 MG/DL (ref 8–23)
CALCIUM SERPL-MCNC: 7.9 MG/DL (ref 8.6–10.4)
CHLORIDE SERPL-SCNC: 113 MMOL/L (ref 98–107)
CO2 SERPL-SCNC: 19 MMOL/L (ref 20–31)
COMPONENT: NORMAL
CREAT SERPL-MCNC: 1 MG/DL (ref 0.7–1.2)
CROSSMATCH RESULT: NORMAL
EKG ATRIAL RATE: 85 BPM
EKG P-R INTERVAL: 152 MS
EKG Q-T INTERVAL: 390 MS
EKG QRS DURATION: 78 MS
EKG QTC CALCULATION (BAZETT): 464 MS
EKG R AXIS: 11 DEGREES
EKG T AXIS: 4 DEGREES
EKG VENTRICULAR RATE: 85 BPM
GFR, ESTIMATED: 84 ML/MIN/1.73M2
GLUCOSE SERPL-MCNC: 126 MG/DL (ref 74–99)
HCT VFR BLD AUTO: 24.6 % (ref 41–53)
HCT VFR BLD AUTO: 25.2 % (ref 41–53)
HGB BLD-MCNC: 7.4 G/DL (ref 13.5–17.5)
HGB BLD-MCNC: 7.5 G/DL (ref 13.5–17.5)
POTASSIUM SERPL-SCNC: 3.8 MMOL/L (ref 3.7–5.3)
PROT SERPL-MCNC: 4.8 G/DL (ref 6.6–8.7)
SODIUM SERPL-SCNC: 140 MMOL/L (ref 136–145)
TRANSFUSION STATUS: NORMAL
UNIT DIVISION: 0
UNIT ISSUE DATE/TIME: NORMAL
UNIT TAG COMMENT: NORMAL

## 2025-05-22 PROCEDURE — 6360000002 HC RX W HCPCS: Performed by: INTERNAL MEDICINE

## 2025-05-22 PROCEDURE — 6370000000 HC RX 637 (ALT 250 FOR IP): Performed by: INTERNAL MEDICINE

## 2025-05-22 PROCEDURE — 6370000000 HC RX 637 (ALT 250 FOR IP): Performed by: NURSE PRACTITIONER

## 2025-05-22 PROCEDURE — 85018 HEMOGLOBIN: CPT

## 2025-05-22 PROCEDURE — APPSS30 APP SPLIT SHARED TIME 16-30 MINUTES: Performed by: NURSE PRACTITIONER

## 2025-05-22 PROCEDURE — 80053 COMPREHEN METABOLIC PANEL: CPT

## 2025-05-22 PROCEDURE — 36415 COLL VENOUS BLD VENIPUNCTURE: CPT

## 2025-05-22 PROCEDURE — 85014 HEMATOCRIT: CPT

## 2025-05-22 PROCEDURE — 99239 HOSP IP/OBS DSCHRG MGMT >30: CPT | Performed by: INTERNAL MEDICINE

## 2025-05-22 PROCEDURE — 2580000003 HC RX 258: Performed by: INTERNAL MEDICINE

## 2025-05-22 RX ORDER — NADOLOL 20 MG/1
20 TABLET ORAL 2 TIMES DAILY
Qty: 30 TABLET | Refills: 3 | Status: SHIPPED | OUTPATIENT
Start: 2025-05-22

## 2025-05-22 RX ORDER — GABAPENTIN 100 MG/1
100 CAPSULE ORAL ONCE
Status: COMPLETED | OUTPATIENT
Start: 2025-05-22 | End: 2025-05-22

## 2025-05-22 RX ORDER — PANTOPRAZOLE SODIUM 40 MG/1
40 TABLET, DELAYED RELEASE ORAL
Status: DISCONTINUED | OUTPATIENT
Start: 2025-05-23 | End: 2025-05-22 | Stop reason: HOSPADM

## 2025-05-22 RX ORDER — NADOLOL 20 MG/1
20 TABLET ORAL 2 TIMES DAILY
Status: DISCONTINUED | OUTPATIENT
Start: 2025-05-22 | End: 2025-05-22 | Stop reason: HOSPADM

## 2025-05-22 RX ADMIN — Medication 400 MG: at 08:44

## 2025-05-22 RX ADMIN — SUCRALFATE 1 G: 1 TABLET ORAL at 06:01

## 2025-05-22 RX ADMIN — GABAPENTIN 100 MG: 100 CAPSULE ORAL at 08:44

## 2025-05-22 RX ADMIN — FOLIC ACID 1000 MCG: 1 TABLET ORAL at 08:44

## 2025-05-22 RX ADMIN — DULOXETINE HYDROCHLORIDE 30 MG: 30 CAPSULE, DELAYED RELEASE ORAL at 08:44

## 2025-05-22 RX ADMIN — METOPROLOL TARTRATE 12.5 MG: 25 TABLET, FILM COATED ORAL at 08:44

## 2025-05-22 RX ADMIN — GABAPENTIN 100 MG: 100 CAPSULE ORAL at 01:05

## 2025-05-22 RX ADMIN — FERROUS SULFATE TAB 325 MG (65 MG ELEMENTAL FE) 325 MG: 325 (65 FE) TAB at 08:44

## 2025-05-22 RX ADMIN — SODIUM CHLORIDE 40 MG: 9 INJECTION INTRAMUSCULAR; INTRAVENOUS; SUBCUTANEOUS at 08:44

## 2025-05-22 RX ADMIN — PROCHLORPERAZINE EDISYLATE 10 MG: 5 INJECTION INTRAMUSCULAR; INTRAVENOUS at 08:59

## 2025-05-22 ASSESSMENT — PAIN DESCRIPTION - ONSET
ONSET: ON-GOING
ONSET: ON-GOING

## 2025-05-22 ASSESSMENT — PAIN DESCRIPTION - PAIN TYPE
TYPE: NEUROPATHIC PAIN
TYPE: NEUROPATHIC PAIN

## 2025-05-22 ASSESSMENT — PAIN SCALES - GENERAL
PAINLEVEL_OUTOF10: 3
PAINLEVEL_OUTOF10: 8

## 2025-05-22 ASSESSMENT — PAIN DESCRIPTION - LOCATION
LOCATION: HAND
LOCATION: HAND

## 2025-05-22 ASSESSMENT — PAIN DESCRIPTION - DESCRIPTORS
DESCRIPTORS: DISCOMFORT;OTHER (COMMENT)
DESCRIPTORS: OTHER (COMMENT)

## 2025-05-22 ASSESSMENT — PAIN DESCRIPTION - FREQUENCY
FREQUENCY: INTERMITTENT
FREQUENCY: INTERMITTENT

## 2025-05-22 ASSESSMENT — PAIN DESCRIPTION - ORIENTATION
ORIENTATION: RIGHT;LEFT
ORIENTATION: RIGHT;LEFT

## 2025-05-22 NOTE — CARE COORDINATION
Continuity of Care Form    Patient Name: Frank Lisa   :  1960  MRN:  [id number]    Admit date:  2025  Discharge date:  2025    Code Status Order: Full Code   Advance Directives:    Date/Time Healthcare Directive Type of Healthcare Directive Copy in Chart Healthcare Agent Appointed Healthcare Agent's Name Healthcare Agent's Phone Number    [phone] Yes, patient has an advance directive for healthcare treatment  Living will;Durable power of  for health care  --  --  --  --             Admitting Physician:  Jaziel Cerda MD  PCP: Lopez Rosario PA    Discharging Nurse: Rosa Maria  [facility] Hospital Unit/Room#:   Discharging Unit Phone Number: [phone]    Emergency Contact:   Extended Emergency Contact Information  Primary Emergency Contact: Maria LNilam  Address: [address] [place] [address]  Home Phone: [phone]  Mobile Phone: [phone]  Relation: Ex-Spouse  Secondary Emergency Contact: Nilam Houston  Address: [address] [place] [address]  Home Phone: [phone]  Mobile Phone: [phone]  Relation: Ex-Spouse    Past Surgical History:  Past Surgical History:   Procedure Laterality Date    APPENDECTOMY      BUNIONECTOMY      twice on right side    BUNIONECTOMY Left     CARPAL TUNNEL RELEASE Right     COLON SURGERY  10/10/2023    EXPLORATORY LAPAROTOMY, REVERSAL ILEOSTOMY WITH ILEOCOLOSTOMY, LYSIS OF ADHESIONS,    COLONOSCOPY      at age 39    COLONOSCOPY  10/05/2016    polyps-pathology tubular adenoma, and abnormal looking mucosa right colon-pathology-tubular adenoma    COLONOSCOPY N/A 2018    COLONOSCOPY POLYPECTOMY COLD BIOPSY performed by Augusto Cordova MD at [facility] OR    COLONOSCOPY  2018    Small polyp in the sigmoid colon and excised with biopsy forceps--tubular adenoma    COLONOSCOPY N/A 2022    COLONOSCOPY POLYPECTOMY performed by Augusto Cordova MD at [facility]  Therapy:   - Oral Diet:  Dysphagia - Soft and Bite Sized    Routes of Feeding: Oral  Liquids: No Restrictions  Daily Fluid Restriction: no  Last Modified Barium Swallow with Video (Video Swallowing Test): done on 02/2024    Treatments at the Time of Hospital Discharge:   Respiratory Treatments: none  Oxygen Therapy:  is not on home oxygen therapy.  Ventilator:    - No ventilator support    Rehab Therapies: Physical Therapy and Occupational Therapy  Weight Bearing Status/Restrictions: No weight bearing restrictions  Other Medical Equipment (for information only, NOT a DME order):  walker and hospital bed  Other Treatments: Skilled Nursing assessment and monitoring. Medication education and monitoring per protocol.     Patient's personal belongings (please select all that are sent with patient):  All patient belongings left with the patient at discharge.     RN SIGNATURE:  Electronically signed by Rosa Maria Tse RN on 5/22/25 at 1:18 PM EDT    CASE MANAGEMENT/SOCIAL WORK SECTION    Inpatient Status Date: 5/20/25    Readmission Risk Assessment Score:  [facility] RISK OF UNPLANNED READMISSION 2.0             24.4 Total Score        Discharging to Facility/ Agency   [facility]  P: [phone]  F: [phone]    Dialysis Facility (if applicable)   Name:  Address:  Dialysis Schedule:  Phone:  Fax:    / signature: Electronically signed by Sharee Anaya RN on 5/22/25 at 1:32 PM EDT    PHYSICIAN SECTION    Prognosis: Good    Condition at Discharge: Stable    Rehab Potential (if transferring to Rehab): Good    Recommended Labs or Other Treatments After Discharge:     Physician Certification: I certify the above information and transfer of Frank Lisa  is necessary for the continuing treatment of the diagnosis listed and that he requires Home Care for less 30 days.     Update Admission H&P: No change in H&P    PHYSICIAN SIGNATURE:  Electronically signed by Jaziel Cerda MD on 5/22/25 at 11:48 AM

## 2025-05-22 NOTE — DISCHARGE SUMMARY
IN-PATIENT SERVICE   [facility] Internal Medicine    Discharge Summary     Patient ID: Frank Lisa  :  1960   MRN: [id number]     ACCOUNT:  [id number]   Patient's PCP: Lopez Rosario PA  Admit Date: 2025   Discharge Date: 2025    Length of Stay: 2  Code Status:  Full Code  Admitting Physician: Jaziel Cerda MD  Discharge Physician: Jaziel Cerda MD     Active Discharge Diagnoses:     Primary Problem  Symptomatic anemia      Hospital Problems  Active Hospital Problems    Diagnosis Date Noted    Symptomatic anemia [D64.9] 2025       Admission Condition:  fair     Discharged Condition: fair    Hospital Stay:     Hospital Course:  Frank Lisa is a 64 y.o. male who was admitted for the management of Symptomatic anemia , presented to ER with Chest Pain (Sudden onset last night with SOB, nausea and diaphoresis/Increased WOB observed, /Radiation to left arm, feels like he's having a heart attack)  64-year-old gentleman with history of hepatitis C history of alcoholic liver cirrhosis with portal hypertension status post TIPS procedure  History of esophageal high-grade dysplasia in  EGD negative for malignancy since then  History of right hemicolon mesenteric ischemia requiring right hemicolectomy end-to-end anastomosis now  History of anemia secondary liver disease alcohol abuse related bone marrow suppression admitted with some dysphagia chest pain atypical and also has dyspnea on exertion at rest and some orthopnea  Severe anemia hemoglobin 6.4 no overt GI bleed patient is noncompliant with iron supplement   Post EGD  Impression:    Multiple columns of large esophageal varices, red johnathan sign, 4 band successfully placed with deflation of varices  Abnormal salmon-colored mucosa extending 3 to 4 cm above, biopsies not obtained as underlying varices.  Moderate portal hypertensive gastropathy.  Possible gastric varix (GOV2 vs IGV1)  Stomach and duodenal biopsies  No evidence of pulmonary embolism. 2. Cardiomegaly with small pericardial effusion. 3. Small right pleural effusion. 4. 8.9 x 8.5 mm noncalcified nodule within the medial aspect of the left upper lobe. 5. Cirrhosis with patent TIPS. 6. Cholelithiasis. 7. Small volume of ascites. 8. Small elliptical fluid collection within the anterior abdominal wall measuring 3.9 x 0.9 cm. This may represent a small seroma. 9. Small hiatal hernia. 10. Small bilateral fat containing inguinal hernias. 11. Stable calcified walled proximal splenic artery aneurysm measuring 3.1 x 2.4 cm and appears to be occluded. RECOMMENDATIONS: Fleischner Society guidelines for follow-up and management of incidentally detected pulmonary nodules: Single Solid Nodule: Nodule size greater than 8 mm In a low-risk patient, consider CT at 3 months, PET/CT, or tissue sampling. In a high-risk patient, consider CT at 3 months, PET/CT, or tissue sampling. - Low risk patients include individuals with minimal or absent history of smoking and other known risk factors. - High risk patients include individuals with a history or smoking or known risk factors. Radiology 2017 http://pubs.rsna.org/doi/full/10.1148/radiol.7054248473     XR CHEST (2 VW)  Result Date: 5/20/2025  EXAMINATION: TWO XRAY VIEWS OF THE CHEST 5/20/2025 4:12 pm COMPARISON: None. HISTORY: ORDERING SYSTEM PROVIDED HISTORY: Chest Pain, sob TECHNOLOGIST PROVIDED HISTORY: Chest Pain, sob Reason for Exam: chest pain, sob FINDINGS: There is a calcified granuloma noted in the the lingula.  Lungs otherwise are clear.  The heart and mediastinal structures appear normal.  Patient had prior upper thoracic and lower thoracic and upper lumbar kyphoplasties. There are no acute abnormalities noted.     No acute cardiopulmonary process.         Consultations:    Consults:     Final Specialist Recommendations/Findings:   IP CONSULT TO GI      The patient was seen and examined on day of discharge and this discharge  summary is in conjunction with any daily progress note from day of discharge.    Discharge plan:     Disposition: Home    Physician Follow Up:   [facility]  [address] [place] [id number] [phone]    They will contact you to set up a time to come to the home for start of care.       Requiring Further Evaluation/Follow Up POST HOSPITALIZATION/Incidental Findings:    Diet: cardiac diet    Activity: As tolerated    Instructions to Patient:     Discharge Medications:      Medication List        START taking these medications      nadolol 20 MG tablet  Commonly known as: CORGARD  Take 1 tablet by mouth 2 times daily            CONTINUE taking these medications      atorvastatin 40 MG tablet  Commonly known as: LIPITOR  Take 1 tablet by mouth nightly     DULoxetine 30 MG extended release capsule  Commonly known as: Cymbalta  Take 1 capsule by mouth daily     ferrous sulfate 325 (65 Fe) MG tablet  Commonly known as: IRON 325     folic acid 1 MG tablet  Commonly known as: FOLVITE  TAKE 1 TABLET BY MOUTH EVERY DAY     gabapentin 100 MG capsule  Commonly known as: NEURONTIN  Take 1 capsule by mouth 2 times daily for 180 days. Intended supply: 90 days     Handicap Placard Misc  by Does not apply route Exp: 5-14-26     lactulose 10 GM/15ML solution  Commonly known as: CHRONULAC  Take 45 mLs by mouth in the morning, at noon, in the evening, and at bedtime     magnesium oxide 400 MG tablet  Commonly known as: MAG-OX  Take 1 tablet by mouth daily     Medical Compression Stockings Misc  2 each by Does not apply route daily Right and left knee high compression stockings.  30-40 mmHg.  To be worn continuously throughout the day.     pantoprazole 40 MG tablet  Commonly known as: PROTONIX  Take 1 tablet by mouth in the morning and at bedtime     sucralfate 1 GM tablet  Commonly known as: CARAFATE  Take 1 tablet by mouth 4 times daily (before meals and nightly)            STOP taking

## 2025-05-22 NOTE — PLAN OF CARE
Problem: ABCDS Injury Assessment  Goal: Absence of physical injury  5/22/2025 1310 by Rosa Maria Tse RN  Outcome: Adequate for Discharge  5/22/2025 1310 by Rosa Maria Tse RN  Outcome: Adequate for Discharge  5/22/2025 1310 by Rosa Maria Tse RN  Outcome: Adequate for Discharge     Problem: Safety - Adult  Goal: Free from fall injury  5/22/2025 1310 by Rosa Maria Tse RN  Outcome: Adequate for Discharge  5/22/2025 1310 by Rosa Maria Tse RN  Outcome: Adequate for Discharge  5/22/2025 1310 by Rosa Maria Tse RN  Outcome: Adequate for Discharge  5/22/2025 0315 by Kamilah Brito RN  Outcome: Progressing  Note: No falls noted this shift. Patient ambulates with x1 staff assistance without difficulty.  Bed kept in low position. Safe environment maintained. Bedside table & call light in reach. Uses call light appropriately when needing assistance.  Hourly rounding in place.      Problem: Discharge Planning  Goal: Discharge to home or other facility with appropriate resources  5/22/2025 1310 by Rosa Maria Tse RN  Outcome: Adequate for Discharge  5/22/2025 1310 by Rosa Maria Tse RN  Outcome: Adequate for Discharge  5/22/2025 1310 by Rosa Maria Tse RN  Outcome: Adequate for Discharge     Problem: Pain  Goal: Verbalizes/displays adequate comfort level or baseline comfort level  5/22/2025 1310 by Rosa Maria Tse RN  Outcome: Adequate for Discharge  5/22/2025 1310 by Rosa Maria Tse RN  Outcome: Adequate for Discharge  5/22/2025 1310 by Rosa Maria Tse RN  Outcome: Adequate for Discharge  5/22/2025 0315 by Kamilah Brito RN  Outcome: Progressing  Flowsheets  Taken 5/22/2025 0205  Verbalizes/displays adequate comfort level or baseline comfort level:   Assess pain using appropriate pain scale   Encourage patient to monitor pain and request assistance  Taken 5/22/2025 0105  Verbalizes/displays adequate comfort level or baseline comfort level: Assess pain using appropriate pain scale  Taken 5/21/2025  1922  Verbalizes/displays adequate comfort level or baseline comfort level: Assess pain using appropriate pain scale  Note: Pt medicated with pain medication prn.  Assessed all pain characteristics including level, type, location, frequency, and onset.  Non-pharmacologic interventions offered to pt as well.  Pt states pain is tolerable at this time.       Problem: Metabolic/Fluid and Electrolytes - Adult  Goal: Electrolytes maintained within normal limits  5/22/2025 1310 by Rosa Maria Tse RN  Outcome: Adequate for Discharge  5/22/2025 1310 by Rosa Maria Tse RN  Outcome: Adequate for Discharge  5/22/2025 1310 by Rosa Maria Tse RN  Outcome: Adequate for Discharge  5/22/2025 0315 by Kamilah Brito RN  Outcome: Progressing  Flowsheets (Taken 5/21/2025 1937)  Electrolytes maintained within normal limits: Monitor labs and assess patient for signs and symptoms of electrolyte imbalances     Problem: Hematologic - Adult  Goal: Maintains hematologic stability  5/22/2025 1310 by Rosa Maria Tse RN  Outcome: Adequate for Discharge  5/22/2025 1310 by Rosa Maria Tse RN  Outcome: Adequate for Discharge  5/22/2025 1310 by Rosa Maria Tse RN  Outcome: Adequate for Discharge     Problem: Chronic Conditions and Co-morbidities  Goal: Patient's chronic conditions and co-morbidity symptoms are monitored and maintained or improved  5/22/2025 1310 by Rosa Maria Tse RN  Outcome: Adequate for Discharge  5/22/2025 1310 by Rosa Maria Tse RN  Outcome: Adequate for Discharge  5/22/2025 1310 by Rosa Maria Tse RN  Outcome: Adequate for Discharge  5/22/2025 0315 by Kamilah Brito RN  Outcome: Progressing  Flowsheets (Taken 5/21/2025 1937)  Care Plan - Patient's Chronic Conditions and Co-Morbidity Symptoms are Monitored and Maintained or Improved: Monitor and assess patient's chronic conditions and comorbid symptoms for stability, deterioration, or improvement

## 2025-05-22 NOTE — FLOWSHEET NOTE
05/22/25 0052   Treatment Team Notification   Reason for Communication Evaluate   Name of Team Member Notified Ericka Rushing NP   Treatment Team Role Advanced Practice Nurse   Method of Communication Secure Message   Response See orders   Notification Time 0054        Pt is complaining of BUE neuropathy pain 8/10. Pt was given scheduled Neurontin 100 mg @2030 pm.  See new orders for Neurontin per Ericka Rushing NP.

## 2025-05-22 NOTE — CARE COORDINATION
Case Management   Daily Progress Note       Patient Name: Frank Lisa                   YOB: 1960  Diagnosis: Symptomatic anemia [D64.9]  Anemia, unspecified type [D64.9]  Chest pain, unspecified type [R07.9]                       GMLOS: 3.3 days  Length of Stay: 2  days    Readmission Risk (Low < 19, Mod (19-27), High > 27): Readmission Risk Score: 25      Patient is alert and oriented.    Spoke with patient, and Current Transitional Plan is:    [] Home Independently    [x] Home with HC; with [facility].    [] Skilled Nursing Facility    [] Acute Rehabilitation    [] Long Term Acute Care (LTAC)    [] Other:     Medical Management: POD #1 EGD with banding of varices.    Testing Ordered: Hgb 7.4 today.    Additional Notes: Plan is to discharge home today with outpatient follow up.     IMM letter provided to patient.  Patient offered four hours to make informed decision regarding appeal process; patient agreeable to discharge.     Electronically signed by Sharee Anaya RN on 5/22/2025 at 11:24 AM

## 2025-05-22 NOTE — PLAN OF CARE
Problem: Safety - Adult  Goal: Free from fall injury  5/22/2025 0315 by Kamilah Brito RN  Outcome: Progressing   Note: No falls noted this shift. Patient ambulates with x1 staff assistance without difficulty.  Bed kept in low position. Safe environment maintained. Bedside table & call light in reach. Uses call light appropriately when needing assistance.  Hourly rounding in place.     Problem: Safety - Adult  Goal: Free from fall injury  5/22/2025 0315 by Kamilah Brito RN  Outcome: Progressing   Note; No falls noted this shift. Patient ambulates with x1 staff assistance without difficulty.  Bed kept in low position. Safe environment maintained. Bedside table & call light in reach. Uses call light appropriately when needing assistance.  Hourly rounding in place.     Problem: Metabolic/Fluid and Electrolytes - Adult  Goal: Electrolytes maintained within normal limits  5/22/2025 0315 by Kamilah Brito RN  Outcome: Progressing     Problem: Chronic Conditions and Co-morbidities  Goal: Patient's chronic conditions and co-morbidity symptoms are monitored and maintained or improved  5/22/2025 0315 by Kamilah Brito RN  Outcome: Progressing

## 2025-05-22 NOTE — PROGRESS NOTES
Discussed new medication(s) with the patient and to stop metoprolol. Discussed to pickup meds at [facility] downstairs. Pt to  after discharge. All questions fully answered. Educated on when to follow up with PCP and GI. Notified that an appointment needs to be scheduled. Pt is alert and oriented. Pt denies chest pain and SOB.  No concerns noted.

## 2025-05-22 NOTE — PROGRESS NOTES
[facility]   OCCUPATIONAL THERAPY MISSED TREATMENT NOTE   INPATIENT   Date: 25  Patient Name: Frank Lisa       Room:   MRN: [id number]   Account #: [id number]    : 1960  (64 y.o.)  Gender: male                 REASON FOR MISSED TREATMENT:  Defer OT evaluation; pt IND with functional mobility without AD and ADLs. Pt denies any concerns regarding d/c home. Please re-order if new OT needs arise.    Electronically signed by Ellen Chiu OT on 25 at 11:36 AM EDT

## 2025-05-22 NOTE — PROGRESS NOTES
CLINICAL PHARMACY NOTE: MEDS TO BEDS    Total # of Prescriptions Filled: 1   The following medications were delivered to the patient:  Nadolol 20mg #60    Additional Documentation: 1:46pm 5/22/25 wife Nilam p/u at [facility]      5/22/25 12:45pm urielg attempted delivery pt does NOT have money wife/pt will  [facility] Nurse Rosa Maria kong via [employer]  11:05am unable to reach pt via room phone, other phone at home spoke with family they will stop at [facility] and pay $10 copay and  discharge meds if pt does not have payment when [id number] attempts delivery

## 2025-05-22 NOTE — PROGRESS NOTES
Spiritual Health History and Assessment/Progress Note  [facility]    (P) Spiritual/Emotional Needs, Initial Encounter,  ,  ,      Name: Frank Lisa MRN: [id number]    Age: 64 y.o.     Sex: male   Language: English   Uatsdin: Tenriism   Symptomatic anemia     Date: 5/22/2025            Total Time Calculated: (P) 2 min              Spiritual Assessment began in [facility]        Referral/Consult From: (P) Rounding   Encounter Overview/Reason: (P) Spiritual/Emotional Needs, Initial Encounter  Service Provided For: (P) Patient not available    Vilma, Belief, Meaning:   Patient unable to assess at this time  Family/Friends No family/friends present      Importance and Influence:  Patient unable to assess at this time  Family/Friends No family/friends present    Community:  Patient Other: Unable to assess  Family/Friends No family/friends present    Assessment and Plan of Care:     Patient Interventions include: Other: Unable to assess as Pt. Was unavailable  Family/Friends Interventions include: No family/friends present    Patient Plan of Care: Spiritual Care available upon further referral  Family/Friends Plan of Care: No family/friends present    Electronically signed by Chaplain Bj on 5/22/2025 at 1:38 PM       05/22/25 1336   Encounter Summary   Encounter Overview/Reason Spiritual/Emotional Needs;Initial Encounter   Service Provided For Patient not available   [facility]   Support System Unknown   Last Encounter  05/22/25   Complexity of Encounter Low   Begin Time 1330   End Time  1332   Total Time Calculated 2 min   Spiritual/Emotional needs   Type Spiritual Support   Assessment/Intervention/Outcome   Assessment Unable to assess;Other (Comment)  (Pt. unavailble)   Intervention Other (Comment)  (Silent Prayer Outside Room)   Outcome Did not respond;Other (comment)  (Pt. unavailable)

## 2025-05-22 NOTE — PROGRESS NOTES
biopsies not obtained as there was evidence of varices.  Multiple columns of large esophageal varices noted with red johnathan sign.  4 bands successfully placed  2 to 3 cm hiatal hernia noted.        Stomach:    Fundus: Possible varix on retroflexion view (GOV2 vs IGV1)  Moderate portal hypertensive gastropathy    Body: normal    Antrum: normal  Biopsies obtained to rule out H. pylori     Duodenum:     Bulb: normal    First part: Normal    Second Part: Normal  Biopsies obtained to rule out celiac        Cta abd /chest 5/20/25  FINDINGS:  CTA CHEST:     Pulmonary Arteries: Pulmonary arteries are adequately opacified for  evaluation.  No evidence of intraluminal filling defect to suggest pulmonary  embolism.  Main pulmonary artery is normal in caliber.     Mediastinum: No evidence of mediastinal lymphadenopathy.  The heart and  pericardium demonstrate no acute abnormality.  There is no acute abnormality  of the thoracic aorta.  The heart size is enlarged.  There is a small  pericardial effusion.     Lungs/pleura: The lung parenchyma demonstrates calcified granulomas.  There  is a noncalcified nodule within the medial aspect of the left upper lobe  measuring 8.9 x 8.5 mm (series 6, image 47).  There is a small right pleural  effusion.  No pneumothoraces are seen.     Soft Tissues/Bones: No acute bone or soft tissue abnormality.  Note is made  of bilateral gynecomastia.     CT SCAN ABDOMEN AND PELVIS:     Organs: The liver is small and nodular in appearance.  There is a TIPS which  is patent.  There are gallstones in the gallbladder.  The spleen, pancreas  and adrenal glands appear unremarkable.  There is symmetric enhancement of  the kidneys.  No hydronephrosis is seen.  No ureteral or bladder calculi are  noted.     GI/Bowel: Evaluation of the bowel is limited as no enteric contrast was  given.  No dilated loops of bowel are seen. I do not see a dilated  appendix.There is streak artifact from endovascular coiling of  esophageal  varices.  Note is made of a small hiatal hernia.     Pelvis: No pelvic masses are seen.  There is a small volume of pelvic ascites.     Peritoneum/Retroperitoneum: The abdominal aorta is not aneurysmal. There are  shotty mesenteric and retroperitoneal lymph nodes but no retroperitoneal or  mesenteric lymphadenopathy is seen.  There is a small volume ascites.  Redemonstrated is a calcified walled proximal splenic artery aneurysm  measuring 3.1 x 2.4 cm and appears to be occluded.     Bones/Soft Tissues: No acute bony abnormalities are noted. There are shotty  inguinal lymph nodes noted.There is subcutaneous posterior body wall edema.  There has been prior multilevel thoracic and lumbar vertebroplasty.  There  small bilateral fat containing inguinal hernias.  There is a small elliptical  fluid collection within the anterior abdominal wall measuring 3.9 x 0.9 cm.     IMPRESSION:  1. No evidence of pulmonary embolism.  2. Cardiomegaly with small pericardial effusion.  3. Small right pleural effusion.  4. 8.9 x 8.5 mm noncalcified nodule within the medial aspect of the left  upper lobe.  5. Cirrhosis with patent TIPS.  6. Cholelithiasis.  7. Small volume of ascites.  8. Small elliptical fluid collection within the anterior abdominal wall  measuring 3.9 x 0.9 cm. This may represent a small seroma.  9. Small hiatal hernia.  10. Small bilateral fat containing inguinal hernias.  11. Stable calcified walled proximal splenic artery aneurysm measuring 3.1 x  2.4 cm and appears to be occluded.     ASSESSMENT/plan   Anemia, hx iron deficiency anemia, pt stopped taking replacement, has not had overt gi bleeding fobt negative s/p egd with banding of EV , Possible varix on retroflexion view (GOV2 vs IGV1)  Moderate portal hypertensive gastropathy   yesterday  -f/u bx results  Ppi daily po  Soft diet x 3 days  Repeat EGD in 2 to 3 weeks to reassess varices     2.etoh cirrhosis with hx TIPS and treated hepatitis c no

## 2025-05-22 NOTE — PROGRESS NOTES
Physical Therapy        Physical Therapy Screen Note      DATE: 2025    NAME: Frank Lisa  MRN: [id number]   : 1960      Patient seen this date for Physical Therapy Screen:    2025 [id number]: D/C PT, pt at baseline and discharging home today. Pt demonstrated independent ambulation w/ no device. Expressed no concerns going home mobility wise     The above documentation completed by Student Physical Therapist was provided under the direct line of sight by supervision. Documentation was reviewed and accepted by supervising Clinical Instructor Gill Gresham PT.        Electronically signed by PAPA Tilley on 2025 at 11:54 AM

## 2025-05-23 RX ORDER — ATORVASTATIN CALCIUM 40 MG/1
40 TABLET, FILM COATED ORAL NIGHTLY
Qty: 30 TABLET | Refills: 1 | Status: SHIPPED | OUTPATIENT
Start: 2025-05-23

## 2025-05-23 NOTE — TELEPHONE ENCOUNTER
Pharmacist from [facility] called asking if PCP will fill patient meds. Last it was sent was from the hospital and they refused to refill for patient.

## 2025-05-27 ENCOUNTER — ANESTHESIA EVENT (OUTPATIENT)
Dept: ENDOSCOPY | Age: 65
End: 2025-05-27
Payer: COMMERCIAL

## 2025-05-27 ENCOUNTER — HOSPITAL ENCOUNTER (OUTPATIENT)
Age: 65
Setting detail: OUTPATIENT SURGERY
Discharge: HOME | End: 2025-05-27
Attending: INTERNAL MEDICINE | Admitting: INTERNAL MEDICINE
Payer: COMMERCIAL

## 2025-05-27 ENCOUNTER — TELEPHONE (OUTPATIENT)
Age: 65
End: 2025-05-27

## 2025-05-27 ENCOUNTER — ANESTHESIA (OUTPATIENT)
Dept: ENDOSCOPY | Age: 65
End: 2025-05-27
Payer: COMMERCIAL

## 2025-05-27 VITALS
TEMPERATURE: 98.1 F | HEIGHT: 70 IN | WEIGHT: 212 LBS | OXYGEN SATURATION: 99 % | HEART RATE: 67 BPM | RESPIRATION RATE: 14 BRPM | BODY MASS INDEX: 30.35 KG/M2 | DIASTOLIC BLOOD PRESSURE: 60 MMHG | SYSTOLIC BLOOD PRESSURE: 136 MMHG

## 2025-05-27 DIAGNOSIS — K74.60 DECOMPENSATION OF CIRRHOSIS OF LIVER (HCC): Primary | ICD-10-CM

## 2025-05-27 DIAGNOSIS — K63.89 COLONIC MASS: ICD-10-CM

## 2025-05-27 DIAGNOSIS — K72.90 DECOMPENSATION OF CIRRHOSIS OF LIVER (HCC): Primary | ICD-10-CM

## 2025-05-27 DIAGNOSIS — Z86.0100 HISTORY OF COLON POLYPS: ICD-10-CM

## 2025-05-27 DIAGNOSIS — D64.9 SYMPTOMATIC ANEMIA: ICD-10-CM

## 2025-05-27 PROCEDURE — 88305 TISSUE EXAM BY PATHOLOGIST: CPT

## 2025-05-27 PROCEDURE — 3609010300 HC COLONOSCOPY W/BIOPSY SINGLE/MULTIPLE: Performed by: INTERNAL MEDICINE

## 2025-05-27 PROCEDURE — 88342 IMHCHEM/IMCYTCHM 1ST ANTB: CPT

## 2025-05-27 PROCEDURE — 88341 IMHCHEM/IMCYTCHM EA ADD ANTB: CPT

## 2025-05-27 PROCEDURE — 2709999900 HC NON-CHARGEABLE SUPPLY: Performed by: INTERNAL MEDICINE

## 2025-05-27 PROCEDURE — 2580000003 HC RX 258: Performed by: ANESTHESIOLOGY

## 2025-05-27 PROCEDURE — 3700000001 HC ADD 15 MINUTES (ANESTHESIA): Performed by: INTERNAL MEDICINE

## 2025-05-27 PROCEDURE — 3700000000 HC ANESTHESIA ATTENDED CARE: Performed by: INTERNAL MEDICINE

## 2025-05-27 PROCEDURE — 7100000011 HC PHASE II RECOVERY - ADDTL 15 MIN: Performed by: INTERNAL MEDICINE

## 2025-05-27 PROCEDURE — 6360000002 HC RX W HCPCS: Performed by: NURSE ANESTHETIST, CERTIFIED REGISTERED

## 2025-05-27 PROCEDURE — 45380 COLONOSCOPY AND BIOPSY: CPT | Performed by: INTERNAL MEDICINE

## 2025-05-27 PROCEDURE — 7100000010 HC PHASE II RECOVERY - FIRST 15 MIN: Performed by: INTERNAL MEDICINE

## 2025-05-27 PROCEDURE — 6360000002 HC RX W HCPCS: Performed by: ANESTHESIOLOGY

## 2025-05-27 PROCEDURE — 45381 COLONOSCOPY SUBMUCOUS NJX: CPT | Performed by: INTERNAL MEDICINE

## 2025-05-27 RX ORDER — SODIUM CHLORIDE, SODIUM LACTATE, POTASSIUM CHLORIDE, CALCIUM CHLORIDE 600; 310; 30; 20 MG/100ML; MG/100ML; MG/100ML; MG/100ML
INJECTION, SOLUTION INTRAVENOUS CONTINUOUS
Status: DISCONTINUED | OUTPATIENT
Start: 2025-05-27 | End: 2025-05-27 | Stop reason: HOSPADM

## 2025-05-27 RX ORDER — ONDANSETRON 2 MG/ML
4 INJECTION INTRAMUSCULAR; INTRAVENOUS ONCE
Status: COMPLETED | OUTPATIENT
Start: 2025-05-27 | End: 2025-05-27

## 2025-05-27 RX ORDER — SIMETHICONE 40MG/0.6ML
SUSPENSION, DROPS(FINAL DOSAGE FORM)(ML) ORAL PRN
Status: DISCONTINUED | OUTPATIENT
Start: 2025-05-27 | End: 2025-05-27 | Stop reason: ALTCHOICE

## 2025-05-27 RX ORDER — LIDOCAINE HYDROCHLORIDE 10 MG/ML
1 INJECTION, SOLUTION EPIDURAL; INFILTRATION; INTRACAUDAL; PERINEURAL ONCE
Status: DISCONTINUED | OUTPATIENT
Start: 2025-05-27 | End: 2025-05-27 | Stop reason: HOSPADM

## 2025-05-27 RX ORDER — SODIUM CHLORIDE 0.9 % (FLUSH) 0.9 %
5-40 SYRINGE (ML) INJECTION PRN
Status: DISCONTINUED | OUTPATIENT
Start: 2025-05-27 | End: 2025-05-27 | Stop reason: HOSPADM

## 2025-05-27 RX ORDER — SODIUM CHLORIDE 0.9 % (FLUSH) 0.9 %
5-40 SYRINGE (ML) INJECTION EVERY 12 HOURS SCHEDULED
Status: DISCONTINUED | OUTPATIENT
Start: 2025-05-27 | End: 2025-05-27 | Stop reason: HOSPADM

## 2025-05-27 RX ORDER — PROPOFOL 10 MG/ML
INJECTION, EMULSION INTRAVENOUS
Status: DISCONTINUED | OUTPATIENT
Start: 2025-05-27 | End: 2025-05-27 | Stop reason: SDUPTHER

## 2025-05-27 RX ORDER — SODIUM CHLORIDE 9 MG/ML
INJECTION, SOLUTION INTRAVENOUS PRN
Status: DISCONTINUED | OUTPATIENT
Start: 2025-05-27 | End: 2025-05-27 | Stop reason: HOSPADM

## 2025-05-27 RX ORDER — LIDOCAINE HYDROCHLORIDE 20 MG/ML
INJECTION, SOLUTION EPIDURAL; INFILTRATION; INTRACAUDAL; PERINEURAL
Status: DISCONTINUED | OUTPATIENT
Start: 2025-05-27 | End: 2025-05-27 | Stop reason: SDUPTHER

## 2025-05-27 RX ADMIN — ONDANSETRON 4 MG: 2 INJECTION, SOLUTION INTRAMUSCULAR; INTRAVENOUS at 13:28

## 2025-05-27 RX ADMIN — LIDOCAINE HYDROCHLORIDE 80 MG: 20 INJECTION, SOLUTION EPIDURAL; INFILTRATION; INTRACAUDAL; PERINEURAL at 14:35

## 2025-05-27 RX ADMIN — SODIUM CHLORIDE, POTASSIUM CHLORIDE, SODIUM LACTATE AND CALCIUM CHLORIDE: 600; 310; 30; 20 INJECTION, SOLUTION INTRAVENOUS at 14:33

## 2025-05-27 RX ADMIN — PROPOFOL 70 MG: 10 INJECTION, EMULSION INTRAVENOUS at 14:35

## 2025-05-27 RX ADMIN — PROPOFOL 130 MCG/KG/MIN: 10 INJECTION, EMULSION INTRAVENOUS at 14:34

## 2025-05-27 ASSESSMENT — PAIN - FUNCTIONAL ASSESSMENT
PAIN_FUNCTIONAL_ASSESSMENT: 0-10
PAIN_FUNCTIONAL_ASSESSMENT: NONE - DENIES PAIN

## 2025-05-27 ASSESSMENT — ENCOUNTER SYMPTOMS: SHORTNESS OF BREATH: 1

## 2025-05-27 NOTE — ANESTHESIA PRE PROCEDURE
function, ejection fraction 60 to 65%       Neuro/Psych:   (+) neuromuscular disease:, TIA, psychiatric history:            GI/Hepatic/Renal:   (+) hiatal hernia, GERD:, PUD, hepatitis: C, liver disease: esophageal varices          Endo/Other:    (+) blood dyscrasia: anemia:..                 Abdominal:             Vascular: negative vascular ROS.         Other Findings:         Anesthesia Plan      general and TIVA     ASA 3       Induction: intravenous.      Anesthetic plan and risks discussed with patient.      Plan discussed with CRNA.                Kyle Sanders MD   5/27/2025

## 2025-05-27 NOTE — OP NOTE
5 cm and appeared normal.  Retroflexion examination in the rectum with large internal hemorrhoids    Specimen Removed: Descending/transverse colon biopsies    Endoscopic Impression:    Good prep.  Circumferential, malignant looking ulcerated mass between 58 to 63 cm from anal verge, 5 cm in length.  Narrowing of the lumen because of the mass, scope able to traverse with mild resistance.  Biopsies obtained.  Tattoo placed distally.  Surgical changes consistent with prior right hemicolectomy and ileocolonic anastomosis noted.  Large internal hemorrhoids    Recommendations:  Follow pathology results.  Obtain CEA level.  Will refer to oncology and colorectal surgery    Attending Attestation: I performed the procedure.    Dixon Olivia MD

## 2025-05-27 NOTE — INTERVAL H&P NOTE
Update History & Physical    The patient's History and Physical of May 21, 2025 was reviewed with the patient and I examined the patient. There was no change. The surgical site was confirmed by the patient and me.     Plan: The risks, benefits, expected outcome, and alternative to the recommended procedure have been discussed with the patient. Patient understands and wants to proceed with the procedure.     Electronically signed by Dixon Olivia MD on 5/27/2025 at 1:17 PM

## 2025-05-27 NOTE — ANESTHESIA POSTPROCEDURE EVALUATION
Department of Anesthesiology  Postprocedure Note    Patient: Frank Lisa  MRN: [id number]  YOB: 1960  Date of evaluation: 5/27/2025    Procedure Summary       Date: 05/27/25 Room / Location: [place] [id number] / [facility]    Anesthesia Start: 1433 Anesthesia Stop: 1520    Procedure: COLONOSCOPY BIOPSY, TATOOING AT TRANSVERSE COLON MASS SITE (Rectum) Diagnosis:       History of colon polyps      (History of colon polyps [Z86.0100])    Surgeons: Dixon Olivia MD Responsible Provider: Kyle Sanders MD    Anesthesia Type: general, TIVA ASA Status: 3            Anesthesia Type: No value filed.    Yen Phase I: Yen Score: 10    Yen Phase II: Yen Score: 8    Anesthesia Post Evaluation    Patient location during evaluation: PACU  Patient participation: complete - patient participated  Level of consciousness: awake and alert  Airway patency: patent  Nausea & Vomiting: no vomiting  Cardiovascular status: hemodynamically stable  Respiratory status: acceptable  Hydration status: euvolemic  Comments: POST- ANESTHESIA EVALUATION       Pt Name: Frank Lisa  MRN: [id number]  YOB: 1960  Date of evaluation: 5/27/2025  Time:  4:22 PM      /60   Pulse 67   Temp 98.1 °F (36.7 °C)   Resp 14   Ht 1.778 m (5' 10\")   Wt 96.2 kg (212 lb)   SpO2 99%   BMI 30.42 kg/m²      Consciousness Level  Awake  Cardiopulmonary Status  Stable  Pain Adequately Treated YES  Nausea / Vomiting  NO  Adequate Hydration  YES  Anesthesia Related Complications NONE      Electronically signed by Kyle Sanders MD on 5/27/2025 at 4:22 PM         Pain management: satisfactory to patient    No notable events documented.

## 2025-05-28 ENCOUNTER — TELEPHONE (OUTPATIENT)
Dept: GASTROENTEROLOGY | Age: 65
End: 2025-05-28

## 2025-05-28 ENCOUNTER — TELEPHONE (OUTPATIENT)
Dept: SURGERY | Age: 65
End: 2025-05-28

## 2025-05-28 LAB — SURGICAL PATHOLOGY REPORT: NORMAL

## 2025-05-28 NOTE — TELEPHONE ENCOUNTER
Left voicemail for patient to call the office to schedule a new patient appointment with Dr. Virginia MCDANIELS due to newly found colonic mass.

## 2025-05-28 NOTE — TELEPHONE ENCOUNTER
[facility] and left a voicemail in that Dr Olivia would like patient to be seen urgently with referrel sent for Dx: Colonic Mass. Dr Olivia confirmed patient is aware of this and was notified with regards to the findings. Writer also notified Jackie Coleman's Nurse in regards to referral as well and that Dr Olivia would like patient to be seen urgently for Dx: Colonic Mass. Jackie thanked writer. Please advise.

## 2025-05-29 LAB — SURGICAL PATHOLOGY REPORT: NORMAL

## 2025-05-30 ENCOUNTER — OFFICE VISIT (OUTPATIENT)
Dept: SURGERY | Age: 65
End: 2025-05-30
Payer: COMMERCIAL

## 2025-05-30 VITALS
WEIGHT: 216 LBS | SYSTOLIC BLOOD PRESSURE: 155 MMHG | BODY MASS INDEX: 30.92 KG/M2 | HEART RATE: 98 BPM | DIASTOLIC BLOOD PRESSURE: 80 MMHG | HEIGHT: 70 IN

## 2025-05-30 DIAGNOSIS — R91.1 PULMONARY NODULE: ICD-10-CM

## 2025-05-30 DIAGNOSIS — K70.31 ALCOHOLIC CIRRHOSIS OF LIVER WITH ASCITES (HCC): ICD-10-CM

## 2025-05-30 DIAGNOSIS — Z91.89 COLON CANCER HIGH RISK: Primary | ICD-10-CM

## 2025-05-30 PROCEDURE — 3079F DIAST BP 80-89 MM HG: CPT | Performed by: COLON & RECTAL SURGERY

## 2025-05-30 PROCEDURE — 3077F SYST BP >= 140 MM HG: CPT | Performed by: COLON & RECTAL SURGERY

## 2025-05-30 PROCEDURE — 1123F ACP DISCUSS/DSCN MKR DOCD: CPT | Performed by: COLON & RECTAL SURGERY

## 2025-05-30 PROCEDURE — 99203 OFFICE O/P NEW LOW 30 MIN: CPT | Performed by: COLON & RECTAL SURGERY

## 2025-05-30 ASSESSMENT — ENCOUNTER SYMPTOMS
SHORTNESS OF BREATH: 1
RECTAL PAIN: 0
ABDOMINAL PAIN: 1
NAUSEA: 0
COUGH: 0
CONSTIPATION: 0
BLOOD IN STOOL: 0
DIARRHEA: 0
BACK PAIN: 0
CHEST TIGHTNESS: 0
APNEA: 0
VOMITING: 0
ABDOMINAL DISTENTION: 1
ANAL BLEEDING: 0
STRIDOR: 0
COLOR CHANGE: 0
WHEEZING: 1

## 2025-05-30 NOTE — PROGRESS NOTES
allergies and immunocompromised state.   Neurological:  Negative for syncope, speech difficulty, weakness, light-headedness, numbness and headaches.   Hematological:  Negative for adenopathy. Bruises/bleeds easily.   Psychiatric/Behavioral:  The patient is not nervous/anxious.        Physical Exam:      BP (!) 155/80 (BP Site: Left Lower Arm, Patient Position: Sitting, BP Cuff Size: Large Adult)   Pulse 98   Ht 1.778 m (5' 10\")   Wt 98 kg (216 lb)   BMI 30.99 kg/m²     HEENT: WNL  Lymph nodes: Normal cervical lymph nodes  Heart: regular rate and rhythm  Lungs: Bilaterally clear to auscultation.  Abdomen: soft, nontender, nondistended, no masses or organomegaly.  Multiple scars noted in abdominal wall including vertical incisions and previous stoma sites.  Extremities: Normal with no palpable edema.      Studies Review:     LABS:  CBC:   Lab Results   Component Value Date    WBC 6.2 05/20/2025    HGB 7.5 (L) 05/22/2025    HCT 24.6 (L) 05/22/2025    MCV 83.3 05/20/2025     05/20/2025        BMP:   Lab Results   Component Value Date/Time     05/22/2025 03:25 AM    K 3.8 05/22/2025 03:25 AM     05/22/2025 03:25 AM    CO2 19 05/22/2025 03:25 AM    BUN 10 05/22/2025 03:25 AM    CREATININE 1.0 05/22/2025 03:25 AM    GLUCOSE 126 05/22/2025 03:25 AM    GLUCOSE 71 04/21/2023 04:18 AM    CALCIUM 7.9 05/22/2025 03:25 AM    LABGLOM 84 05/22/2025 03:25 AM    LABGLOM >90 04/28/2024 03:13 PM         Assessment and Plan:       Frank was seen today for new patient.    Diagnoses and all orders for this visit:    Colon cancer high risk    Alcoholic cirrhosis of liver with ascites (HCC)    Pulmonary nodule         1. Colon cancer high risk    2. Alcoholic cirrhosis of liver with ascites (HCC)    3. Pulmonary nodule    Assessment:  Colonic tumor at 58 cm per GI notes.  Significant medical comorbidities.  History of multiple previous abdominal surgeries with mesh placement and right colon resection    Plan:    1.

## 2025-06-02 ENCOUNTER — TELEPHONE (OUTPATIENT)
Dept: SURGERY | Age: 65
End: 2025-06-02

## 2025-06-02 ENCOUNTER — PREP FOR PROCEDURE (OUTPATIENT)
Dept: SURGERY | Age: 65
End: 2025-06-02

## 2025-06-02 DIAGNOSIS — C18.7 ADENOCARCINOMA OF SIGMOID COLON (HCC): Primary | ICD-10-CM

## 2025-06-02 DIAGNOSIS — C18.7 ADENOCARCINOMA OF SIGMOID COLON (HCC): ICD-10-CM

## 2025-06-02 RX ORDER — POLYETHYLENE GLYCOL 3350, SODIUM SULFATE ANHYDROUS, SODIUM BICARBONATE, SODIUM CHLORIDE, POTASSIUM CHLORIDE 236; 22.74; 6.74; 5.86; 2.97 G/4L; G/4L; G/4L; G/4L; G/4L
POWDER, FOR SOLUTION ORAL
Qty: 4000 ML | Refills: 0 | Status: SHIPPED | OUTPATIENT
Start: 2025-06-02

## 2025-06-03 ENCOUNTER — TELEPHONE (OUTPATIENT)
Age: 65
End: 2025-06-03

## 2025-06-03 ENCOUNTER — OFFICE VISIT (OUTPATIENT)
Age: 65
End: 2025-06-03
Payer: COMMERCIAL

## 2025-06-03 VITALS
BODY MASS INDEX: 31.38 KG/M2 | WEIGHT: 218.7 LBS | SYSTOLIC BLOOD PRESSURE: 152 MMHG | RESPIRATION RATE: 18 BRPM | HEART RATE: 64 BPM | DIASTOLIC BLOOD PRESSURE: 85 MMHG | TEMPERATURE: 96.5 F | OXYGEN SATURATION: 100 %

## 2025-06-03 DIAGNOSIS — C15.5 MALIGNANT NEOPLASM OF LOWER THIRD OF ESOPHAGUS (HCC): Primary | ICD-10-CM

## 2025-06-03 PROCEDURE — 99204 OFFICE O/P NEW MOD 45 MIN: CPT | Performed by: INTERNAL MEDICINE

## 2025-06-03 PROCEDURE — 1123F ACP DISCUSS/DSCN MKR DOCD: CPT | Performed by: INTERNAL MEDICINE

## 2025-06-03 PROCEDURE — 3077F SYST BP >= 140 MM HG: CPT | Performed by: INTERNAL MEDICINE

## 2025-06-03 PROCEDURE — 3079F DIAST BP 80-89 MM HG: CPT | Performed by: INTERNAL MEDICINE

## 2025-06-03 NOTE — TELEPHONE ENCOUNTER
Name: Frank Lisa  : 1960  MRN: [id number]    Oncology Navigation- Initial Note:    Intake-  Contact Type: Telephone    Continuum of Care: Diagnosis/Active Treatment    Notes: Writer reassigned self to navigation as writer previously navigated pt for esophageal cancer. Pt has newly dx colon CA. Writer spoke to pts wife Pierre. Pierre was already familiar with writer and navigation services.     Pt is scheduled for PET on  and colonoscopy with Dr. Coleman on . Pierre states that pt needs a paracentesis and she's been trying to contact Dr. Olivia office. After reviewing chart, writer notes that Dr. Olivia placed the order on . Writer provided Pierre wit IR's number to call and schedule. Pierre appreciative of support and was encouraged to reach out with any needs or questions. Writer will obtain a post surgical f/u also with Dr. Durán once surgery has been scheduled. Will continue to follow.    Electronically signed by Caitie Arrington RN on 6/3/2025 at 12:08 PM

## 2025-06-03 NOTE — PROGRESS NOTES
Patient ID: Frank Lisa, 1960, [id number], 64 y.o.  Referred by : Dr Chung  Reason for consultation:   Esophageal cancer T2N0Mx  Stage II   started on concurrent chemoradiation preoperatively on 5/4/21  Chemoradiation completed 6/9/21  Patient had a PET scan on 7/23/2021 which showed no evidence of recurrence  Patient has been evaluated by thoracic surgeon at [facility] Dr. Velásquez and also hepatologist Dr. Sheffield  At [place] his case was discussed at thoracic tumor oncology board with recommendations NOT to do any surgery because of his liver failure.  So surveillance recommended  He had an upper endoscopy on 8/31/21 which showed no residual cancer but showed Lezama's esophagus with high-grade dysplasia.   Another endoscopy on 1/21/2022 was negative for recurrence  His CEA level is rising therefore PET scan was done on 10/26/2023 showed mild metabolic activity in the distal esophagus and no obvious recurrence noted  Patient had a EGD on 1/9/2024 and the biopsy showed no evidence of recurrence  Patient is still had a colonoscopy on 5/27/2025 which showed large ulcerated mass 58-63 cm from anal verge, in the transverse colon and the biopsy showed invasive adenocarcinoma  CT chest abdomen pelvis in 5/20/2025 showed 8.9 cm nodule in the left upper lobe  HISTORY OF PRESENT ILLNESS:    Oncologic History:  Frank Lisa is a 64 y.o. male with a history of hepatitis C, status post treatment, liver cirrhosis, history of alcohol abuse was seen during initial consultation visit for newly diagnosed esophageal cancer.    Patient reportedly had \"heavy drinking alcohol in about 2016 however recently in December he had 2 beers and he presented with hematemesis.  On 12/29/2020 he had upper endoscopy which showed severe portal hypertension, gastropathy.  The biopsy from the GE junction showed invasive adenocarcinoma.  Patient had a follow-up EGD on 2/2/2021 which confirmed esophageal

## 2025-06-04 ENCOUNTER — HOSPITAL ENCOUNTER (OUTPATIENT)
Dept: ULTRASOUND IMAGING | Age: 65
Discharge: HOME | End: 2025-06-06

## 2025-06-04 DIAGNOSIS — K70.31 ALCOHOLIC CIRRHOSIS OF LIVER WITH ASCITES (HCC): ICD-10-CM

## 2025-06-05 ENCOUNTER — TELEPHONE (OUTPATIENT)
Dept: INTERVENTIONAL RADIOLOGY/VASCULAR | Age: 65
End: 2025-06-05

## 2025-06-05 RX ORDER — LACTULOSE 10 G/15ML
30 SOLUTION ORAL 4 TIMES DAILY
Qty: 946 ML | Refills: 0 | Status: SHIPPED | OUTPATIENT
Start: 2025-06-05 | End: 2025-09-03

## 2025-06-06 ENCOUNTER — HOSPITAL ENCOUNTER (EMERGENCY)
Age: 65
Discharge: HOME | End: 2025-06-07
Attending: STUDENT IN AN ORGANIZED HEALTH CARE EDUCATION/TRAINING PROGRAM
Payer: COMMERCIAL

## 2025-06-06 ENCOUNTER — APPOINTMENT (OUTPATIENT)
Dept: CT IMAGING | Age: 65
End: 2025-06-06
Payer: COMMERCIAL

## 2025-06-06 ENCOUNTER — APPOINTMENT (OUTPATIENT)
Dept: GENERAL RADIOLOGY | Age: 65
End: 2025-06-06
Payer: COMMERCIAL

## 2025-06-06 ENCOUNTER — HOSPITAL ENCOUNTER (OUTPATIENT)
Dept: ULTRASOUND IMAGING | Age: 65
Discharge: HOME | End: 2025-06-08
Payer: COMMERCIAL

## 2025-06-06 VITALS
HEART RATE: 84 BPM | HEIGHT: 70 IN | OXYGEN SATURATION: 96 % | SYSTOLIC BLOOD PRESSURE: 148 MMHG | TEMPERATURE: 98.4 F | DIASTOLIC BLOOD PRESSURE: 73 MMHG | RESPIRATION RATE: 21 BRPM | BODY MASS INDEX: 28.63 KG/M2 | WEIGHT: 200 LBS

## 2025-06-06 VITALS
OXYGEN SATURATION: 100 % | HEART RATE: 72 BPM | SYSTOLIC BLOOD PRESSURE: 152 MMHG | RESPIRATION RATE: 18 BRPM | DIASTOLIC BLOOD PRESSURE: 76 MMHG

## 2025-06-06 DIAGNOSIS — K72.90 DECOMPENSATION OF CIRRHOSIS OF LIVER (HCC): ICD-10-CM

## 2025-06-06 DIAGNOSIS — K74.60 DECOMPENSATION OF CIRRHOSIS OF LIVER (HCC): ICD-10-CM

## 2025-06-06 DIAGNOSIS — R18.8 OTHER ASCITES: ICD-10-CM

## 2025-06-06 DIAGNOSIS — R42 LIGHTHEADEDNESS: Primary | ICD-10-CM

## 2025-06-06 LAB
ALBUMIN FLD-MCNC: 0.6 G/DL
ANION GAP SERPL CALCULATED.3IONS-SCNC: 10 MMOL/L (ref 9–16)
APPEARANCE FLD: NORMAL
BASOPHILS # BLD: 0.07 K/UL (ref 0–0.2)
BASOPHILS NFR BLD: 1 % (ref 0–2)
BNP SERPL-MCNC: 203 PG/ML (ref 0–300)
BODY FLD TYPE: NORMAL
BUN SERPL-MCNC: 8 MG/DL (ref 8–23)
CALCIUM SERPL-MCNC: 7.9 MG/DL (ref 8.6–10.4)
CHLORIDE SERPL-SCNC: 111 MMOL/L (ref 98–107)
CLOT CHECK: NORMAL
CO2 SERPL-SCNC: 23 MMOL/L (ref 20–31)
COLOR FLD: YELLOW
CREAT SERPL-MCNC: 1.1 MG/DL (ref 0.7–1.2)
EKG ATRIAL RATE: 85 BPM
EKG P AXIS: 0 DEGREES
EKG P-R INTERVAL: 176 MS
EKG Q-T INTERVAL: 376 MS
EKG QRS DURATION: 80 MS
EKG QTC CALCULATION (BAZETT): 447 MS
EKG R AXIS: -4 DEGREES
EKG T AXIS: -12 DEGREES
EKG VENTRICULAR RATE: 85 BPM
EOSINOPHIL # BLD: 0.2 K/UL (ref 0–0.44)
EOSINOPHILS RELATIVE PERCENT: 3 % (ref 0–4)
ERYTHROCYTE [DISTWIDTH] IN BLOOD BY AUTOMATED COUNT: 22.1 % (ref 11.5–14.9)
GFR, ESTIMATED: 74 ML/MIN/1.73M2
GLUCOSE SERPL-MCNC: 102 MG/DL (ref 74–99)
HCT VFR BLD AUTO: 25.5 % (ref 41–53)
HGB BLD-MCNC: 7.8 G/DL (ref 13.5–17.5)
IMM GRANULOCYTES # BLD AUTO: 0 K/UL (ref 0–0.3)
IMM GRANULOCYTES NFR BLD: 0 %
LACTATE BLDV-SCNC: 1.7 MMOL/L (ref 0.5–2.2)
LIPASE SERPL-CCNC: 26 U/L (ref 13–60)
LYMPHOCYTES NFR BLD: 1.07 K/UL (ref 1.1–3.7)
LYMPHOCYTES NFR FLD: 74 %
LYMPHOCYTES RELATIVE PERCENT: 16 % (ref 24–44)
MANUAL DIF COMMENT FLD-IMP: NORMAL
MCH RBC QN AUTO: 25.4 PG (ref 26–34)
MCHC RBC AUTO-ENTMCNC: 30.6 G/DL (ref 31–37)
MCV RBC AUTO: 83.1 FL (ref 80–100)
MESOTHELIAL CELLS BODY FLUID: 1 %
MONOCYTES NFR BLD: 1.07 K/UL (ref 0.1–1.2)
MONOCYTES NFR BLD: 16 % (ref 3–12)
MONOCYTES NFR FLD: 18 %
MORPHOLOGY: ABNORMAL
NEUTROPHILS NFR BLD: 64 % (ref 36–66)
NEUTROPHILS NFR FLD: 7 %
NEUTS SEG NFR BLD: 4.29 K/UL (ref 1.5–8.1)
NRBC BLD-RTO: 0 PER 100 WBC
NUC CELL # FLD: 31 CELLS/UL
PLATELET # BLD AUTO: 159 K/UL (ref 150–450)
PMV BLD AUTO: 9.4 FL (ref 8–13.5)
POTASSIUM SERPL-SCNC: 4 MMOL/L (ref 3.7–5.3)
PROT FLD-MCNC: 0.8 G/DL
RBC # BLD AUTO: 3.07 M/UL (ref 4.21–5.77)
RBC # FLD: <3000 CELLS/UL
SODIUM SERPL-SCNC: 144 MMOL/L (ref 136–145)
SPECIMEN TYPE: NORMAL
SPECIMEN TYPE: NORMAL
TROPONIN I SERPL HS-MCNC: 17 NG/L (ref 0–22)
UNIDENT CELLS NFR FLD: NORMAL %
WBC OTHER # BLD: 6.7 K/UL (ref 3.5–11)

## 2025-06-06 PROCEDURE — 99285 EMERGENCY DEPT VISIT HI MDM: CPT

## 2025-06-06 PROCEDURE — 87075 CULTR BACTERIA EXCEPT BLOOD: CPT

## 2025-06-06 PROCEDURE — 96374 THER/PROPH/DIAG INJ IV PUSH: CPT

## 2025-06-06 PROCEDURE — 2580000003 HC RX 258: Performed by: STUDENT IN AN ORGANIZED HEALTH CARE EDUCATION/TRAINING PROGRAM

## 2025-06-06 PROCEDURE — 49083 ABD PARACENTESIS W/IMAGING: CPT

## 2025-06-06 PROCEDURE — 2580000003 HC RX 258

## 2025-06-06 PROCEDURE — 82042 OTHER SOURCE ALBUMIN QUAN EA: CPT

## 2025-06-06 PROCEDURE — 89051 BODY FLUID CELL COUNT: CPT

## 2025-06-06 PROCEDURE — 36415 COLL VENOUS BLD VENIPUNCTURE: CPT

## 2025-06-06 PROCEDURE — 71045 X-RAY EXAM CHEST 1 VIEW: CPT

## 2025-06-06 PROCEDURE — 85025 COMPLETE CBC W/AUTO DIFF WBC: CPT

## 2025-06-06 PROCEDURE — 88305 TISSUE EXAM BY PATHOLOGIST: CPT

## 2025-06-06 PROCEDURE — 2500000003 HC RX 250 WO HCPCS: Performed by: STUDENT IN AN ORGANIZED HEALTH CARE EDUCATION/TRAINING PROGRAM

## 2025-06-06 PROCEDURE — 70498 CT ANGIOGRAPHY NECK: CPT

## 2025-06-06 PROCEDURE — 6360000004 HC RX CONTRAST MEDICATION: Performed by: STUDENT IN AN ORGANIZED HEALTH CARE EDUCATION/TRAINING PROGRAM

## 2025-06-06 PROCEDURE — 93005 ELECTROCARDIOGRAM TRACING: CPT

## 2025-06-06 PROCEDURE — 83880 ASSAY OF NATRIURETIC PEPTIDE: CPT

## 2025-06-06 PROCEDURE — 84157 ASSAY OF PROTEIN OTHER: CPT

## 2025-06-06 PROCEDURE — 83690 ASSAY OF LIPASE: CPT

## 2025-06-06 PROCEDURE — 87205 SMEAR GRAM STAIN: CPT

## 2025-06-06 PROCEDURE — 87070 CULTURE OTHR SPECIMN AEROBIC: CPT

## 2025-06-06 PROCEDURE — 70450 CT HEAD/BRAIN W/O DYE: CPT

## 2025-06-06 PROCEDURE — 6360000002 HC RX W HCPCS

## 2025-06-06 PROCEDURE — 83605 ASSAY OF LACTIC ACID: CPT

## 2025-06-06 PROCEDURE — 88112 CYTOPATH CELL ENHANCE TECH: CPT

## 2025-06-06 PROCEDURE — 84484 ASSAY OF TROPONIN QUANT: CPT

## 2025-06-06 PROCEDURE — 88342 IMHCHEM/IMCYTCHM 1ST ANTB: CPT

## 2025-06-06 PROCEDURE — 80048 BASIC METABOLIC PNL TOTAL CA: CPT

## 2025-06-06 RX ORDER — 0.9 % SODIUM CHLORIDE 0.9 %
1000 INTRAVENOUS SOLUTION INTRAVENOUS ONCE
Status: COMPLETED | OUTPATIENT
Start: 2025-06-06 | End: 2025-06-07

## 2025-06-06 RX ORDER — 0.9 % SODIUM CHLORIDE 0.9 %
100 INTRAVENOUS SOLUTION INTRAVENOUS ONCE
Status: COMPLETED | OUTPATIENT
Start: 2025-06-06 | End: 2025-06-07

## 2025-06-06 RX ORDER — IOPAMIDOL 755 MG/ML
75 INJECTION, SOLUTION INTRAVASCULAR
Status: COMPLETED | OUTPATIENT
Start: 2025-06-06 | End: 2025-06-06

## 2025-06-06 RX ORDER — ONDANSETRON 2 MG/ML
4 INJECTION INTRAMUSCULAR; INTRAVENOUS ONCE
Status: COMPLETED | OUTPATIENT
Start: 2025-06-06 | End: 2025-06-06

## 2025-06-06 RX ORDER — SODIUM CHLORIDE 0.9 % (FLUSH) 0.9 %
10 SYRINGE (ML) INJECTION PRN
Status: COMPLETED | OUTPATIENT
Start: 2025-06-06 | End: 2025-06-06

## 2025-06-06 RX ADMIN — IOPAMIDOL 75 ML: 755 INJECTION, SOLUTION INTRAVENOUS at 23:42

## 2025-06-06 RX ADMIN — SODIUM CHLORIDE, PRESERVATIVE FREE 10 ML: 5 INJECTION INTRAVENOUS at 23:42

## 2025-06-06 RX ADMIN — SODIUM CHLORIDE 1000 ML: 0.9 INJECTION, SOLUTION INTRAVENOUS at 22:46

## 2025-06-06 RX ADMIN — SODIUM CHLORIDE 100 ML: 9 INJECTION, SOLUTION INTRAVENOUS at 23:42

## 2025-06-06 RX ADMIN — ONDANSETRON 4 MG: 2 INJECTION, SOLUTION INTRAMUSCULAR; INTRAVENOUS at 22:47

## 2025-06-06 ASSESSMENT — LIFESTYLE VARIABLES
HOW MANY STANDARD DRINKS CONTAINING ALCOHOL DO YOU HAVE ON A TYPICAL DAY: 1 OR 2
HOW OFTEN DO YOU HAVE A DRINK CONTAINING ALCOHOL: 2-4 TIMES A MONTH

## 2025-06-06 ASSESSMENT — PAIN - FUNCTIONAL ASSESSMENT: PAIN_FUNCTIONAL_ASSESSMENT: NONE - DENIES PAIN

## 2025-06-06 NOTE — BRIEF OP NOTE
Brief Postoperative Note for Paracentesis    Frank Lisa  YOB: 1960  [id number]    Pre-operative Diagnosis:  Ascites     Post-operative Diagnosis: Same    Procedure: Ultrasound guided paracentesis     Anesthesia: 1% Lidocaine     Surgeons/Assistants: David Odom PA-C    Complications: none    EBL: Minimal    Specimens: Were obtained    Ultrasound guided paracentesis performed. 3600 ml clear yellow fluid obtained.  Dressing applied.     Electronically signed by VASU Hook on 6/6/2025 at 3:11 PM

## 2025-06-06 NOTE — PROGRESS NOTES
Patient to US for paracentesis.  JR LEONE and SHARI RDMS at bedside.  Site prepped and draped, area numbed with lidocaine.  3.6L of yellow fluid drained.  Specimen collected.  Dry sterile drsg with tegaderm placed to site.  [facility] tolerated well [facility]

## 2025-06-07 LAB — TROPONIN I SERPL HS-MCNC: 16 NG/L (ref 0–22)

## 2025-06-07 ASSESSMENT — ENCOUNTER SYMPTOMS
ABDOMINAL PAIN: 0
COUGH: 0
DIARRHEA: 0
BACK PAIN: 0
VOMITING: 0
NAUSEA: 1
SHORTNESS OF BREATH: 0

## 2025-06-07 NOTE — ED PROVIDER NOTES
[facility] EMERGENCY DEPARTMENT  Emergency Department Encounter  Emergency Medicine Resident     Pt Name:Frank Lisa  MRN: [id number]  Birthdate 1960  Date of evaluation: 6/6/25  PCP:  Lopez Rosario PA  Note Started: 9:47 PM EDT      CHIEF COMPLAINT       Chief Complaint   Patient presents with    Dizziness    Numbness       HISTORY OF PRESENT ILLNESS  (Location/Symptom, Timing/Onset, Context/Setting, Quality, Duration, Modifying Factors, Severity.)      Frank Lisa is a 64 y.o. male who presents with 90 minutes of numbness and tingling of the extremities as well as nausea without vomiting, lightheadedness without dizziness, and recent paracentesis earlier today.  Patient states that he did use some marijuana to improve his appetite earlier today and he is not sure if his symptoms are related to this.  He does note history of TIPS procedure in the past and known malignancy of the colon with upcoming colonoscopy to locate the lesion to plan for surgery at a quatern[facility] care center.    PAST MEDICAL / SURGICAL / SOCIAL / FAMILY HISTORY      has a past medical history of Abdominal pain, Abnormal EKG, Acute deep vein thrombosis (DVT) of brachial vein of right upper extremity (HCC), 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), Alcoholic cirrhosis of liver with ascites (HCC), AMS (altered mental status), Anemia, Anxiety, Arthritis, Back pain, chronic, Lezama esophagus, Bleeding gastric varices, Bleeds easily, Bowel perforation (HCC), BPH (benign prostatic hypertrophy), Bruises easily, Cholelithiasis, Cirrhosis (HCC), COPD (chronic obstructive pulmonary disease) (HCC), COVID-19, COVID-19 vaccine series completed, DDD (degenerative disc disease), lumbar, Depression, Dizziness, Esophageal adenocarcinoma (HCC), Esophageal cancer (HCC), Esophageal varices (HCC), Extremity numbness, Fatty liver, GERD (gastroesophageal reflux disease), GI bleed, Hallucinations, Hearing loss, Heart murmur, Hep C w/o coma, chronic  [id number]  [phone]    Schedule an appointment as soon as possible for a visit in 1 week        DISCHARGE MEDICATIONS:  Current Discharge Medication List          Derrick Martin MD  Emergency Medicine Resident    (Please note that portions of thisnote were completed with a voice recognition program.  Efforts were made to edit the dictations but occasionally words are mis-transcribed.)

## 2025-06-07 NOTE — DISCHARGE INSTRUCTIONS
Take your medication as indicated and prescribed.   Get up slowly; dangle your feet over the bed before standing up, do not stand up quickly.    If you have not had a stress test in over a year your primary care physician may order this test as further work-up for your lightheadedness.  If you have a cardiologist, then you should also call them to discuss further treatment options.    PLEASE RETURN TO THE EMERGENCY DEPARTMENT IMMEDIATELY for worsening symptoms of increasing pain, shortness of breath, feeling of your heart fluttering or racing, swelling to your feet, unable to lay flat, sensation of the room spinning, slurring of speech, loss of strength, or if you develop any concerning symptoms such as: high fever not relieved by acetaminophen (Tylenol) and/or ibuprofen (Motrin / Advil), chills, persistent nausea and/or vomiting, vomiting up blood, blood in your stool, loss of consciousness, numbness, weakness or tingling in the arms or legs or change in color of the extremities, changes in mental status, persistent headache, blurry vision, loss of bladder / bowel control, unable to follow up with your physician, or any other care or concern.

## 2025-06-07 NOTE — ED PROVIDER NOTES
EMERGENCY DEPARTMENT ENCOUNTER   ATTENDING ATTESTATION     Pt Name: Frank Lisa  MRN: [id number]  Birthdate 1960  Date of evaluation: 6/6/25       Frank Lisa is a 64 y.o. male who presents with Dizziness and Numbness    Patient presenting with some paresthesias in lightheadedness    Did have a paracentesis today    No focal neurologic deficits    Will initiate broad workup given multiple comorbidities    MDM:     Workup is reassuring    Suspect related to marijuana use    No localizing neurologic symptoms labs all reassuring    Doubt CVA, anemia, electrolyte derangement    Vitals:   Vitals:    06/06/25 2148 06/06/25 2200   BP: (!) 170/82 (!) 148/73   Pulse: 88 84   Resp: 18 21   Temp: 98.4 °F (36.9 °C)    TempSrc: Oral    SpO2: 100% 96%   Weight: 90.7 kg (200 lb)    Height: 1.778 m (5' 10\")          I personally saw and examined the patient. I have reviewed and agree with the resident's findings, including all diagnostic interpretations and treatment plan as written. I was present for the key portions of any procedures performed and the inclusive time noted for any critical care statement.    Sigifredo Casillas MD  Attending Emergency Physician            Sigifredo Casillas MD  06/07/25 0024

## 2025-06-08 LAB
MICROORGANISM SPEC CULT: NORMAL
MICROORGANISM/AGENT SPEC: NORMAL
MICROORGANISM/AGENT SPEC: NORMAL
SPECIMEN DESCRIPTION: NORMAL

## 2025-06-09 ENCOUNTER — TELEPHONE (OUTPATIENT)
Dept: SURGERY | Age: 65
End: 2025-06-09

## 2025-06-09 LAB
CASE NUMBER:: NORMAL
EKG ATRIAL RATE: 85 BPM
EKG P AXIS: 0 DEGREES
EKG P-R INTERVAL: 176 MS
EKG Q-T INTERVAL: 376 MS
EKG QRS DURATION: 80 MS
EKG QTC CALCULATION (BAZETT): 447 MS
EKG R AXIS: -4 DEGREES
EKG T AXIS: -12 DEGREES
EKG VENTRICULAR RATE: 85 BPM

## 2025-06-09 NOTE — TELEPHONE ENCOUNTER
06/09/2025- Left voicemail for patient to call the office and go over the instructions below.   06/10/2025- Spoke with patient's ex-spouse Nilam.    Procedure confirmation instructions    Arrival time: 9:45 am  Scheduled time: 11:15 am  Location: [facility]   - Do you have a  that will stay at the hospital, drive you home and will be with you after the procedure? Yes  - Any illness is the last 7 days: No  -call the office ASAP if you are ill prior to your colonoscopy    Blood Thinners: No                 Name: NA                    Last scheduled dose: NA  GLP-1/Weight loss: No    Name: NA   Last scheduled dose: NA    Prep:  Golytely             - Take the prep as directed by the office not on what the label says    - Clear liquid diet the day prior to the appointment- No dairy  - Nothing by mouth after midnight  - The morning of the procedure only take your blood pressure, heart and seizure medication with a small sip of water only.  - No NSAID, ASA, vitamin or herbal supplements 7 days prior  - No smoking the day of the procedure   - Come in comfortable clothes, no make up, nail polish, lotions, leave contacts at home or bring in the case, no jewelry  - Use antibacterial soap (dial soap or Hibiclens) the night before or morning of      Nilam is unsure if he picked up the prep and will check today.   Nilam is going to check for the instructions sent on 06/02 and if she needs them delivered another way she is going to call.   Nilam voiced understanding all the above and will discuss with Jennifer

## 2025-06-10 LAB — SURGICAL PATHOLOGY REPORT: NORMAL

## 2025-06-13 ENCOUNTER — HOSPITAL ENCOUNTER (OUTPATIENT)
Dept: NUCLEAR MEDICINE | Age: 65
Discharge: HOME | End: 2025-06-15
Attending: INTERNAL MEDICINE
Payer: COMMERCIAL

## 2025-06-13 DIAGNOSIS — C15.5 MALIGNANT NEOPLASM OF LOWER THIRD OF ESOPHAGUS (HCC): ICD-10-CM

## 2025-06-13 LAB — GLUCOSE BLD-MCNC: 104 MG/DL (ref 75–110)

## 2025-06-13 PROCEDURE — 78815 PET IMAGE W/CT SKULL-THIGH: CPT

## 2025-06-13 PROCEDURE — 82947 ASSAY GLUCOSE BLOOD QUANT: CPT

## 2025-06-13 PROCEDURE — 3430000000 HC RX DIAGNOSTIC RADIOPHARMACEUTICAL: Performed by: INTERNAL MEDICINE

## 2025-06-13 PROCEDURE — A9609 HC RX DIAGNOSTIC RADIOPHARMACEUTICAL: HCPCS | Performed by: INTERNAL MEDICINE

## 2025-06-13 PROCEDURE — 2500000003 HC RX 250 WO HCPCS: Performed by: INTERNAL MEDICINE

## 2025-06-13 RX ORDER — FLUDEOXYGLUCOSE F 18 200 MCI/ML
10 INJECTION, SOLUTION INTRAVENOUS
Status: COMPLETED | OUTPATIENT
Start: 2025-06-13 | End: 2025-06-13

## 2025-06-13 RX ORDER — SODIUM CHLORIDE 0.9 % (FLUSH) 0.9 %
10 SYRINGE (ML) INJECTION PRN
Status: DISCONTINUED | OUTPATIENT
Start: 2025-06-13 | End: 2025-06-16 | Stop reason: HOSPADM

## 2025-06-13 RX ADMIN — SODIUM CHLORIDE, PRESERVATIVE FREE 10 ML: 5 INJECTION INTRAVENOUS at 13:00

## 2025-06-13 RX ADMIN — FLUDEOXYGLUCOSE F 18 10.85 MILLICURIE: 200 INJECTION, SOLUTION INTRAVENOUS at 13:00

## 2025-06-13 NOTE — PROGRESS NOTES
Physician Progress Note      PATIENT:               ALHAJI COOLEY  CSN #:                  [id number]  :                       1960  ADMIT DATE:       2025 3:49 PM  DISCH DATE:        2025 1:44 PM  RESPONDING  PROVIDER #:        Jaziel Cerda MD          QUERY TEXT:    Anemia is documented in the medical record by Jaziel Cerda MD on    in Internal medicine H&P. Please specify the type:    The clinical indicators include:  This is 64yr old male present with Anemia,    -alcoholic liver cirrhosis    - Symptomatic anemia,History of anemia secondary liver disease alcohol abuse   related bone marrow suppression admitted with some dysphagia chest pain   atypical and also has dyspnea on exertion at rest and some orthopnea  Severe anemia hemoglobin 6.4 no overt GI bleed patient is noncompliant with   iron supplement noted in Internal medicine H&P on  by Jaziel Cerda MD    -Anemia, hx iron deficiency anemia, pt stopped taking replacement, has not had   overt gi bleeding fobt negative  Npo for egd today, likely outpt colonoscopy noted in GI consult note on    by Dixon Olivia MD    - Anemia, hx iron deficiency anemia, pt stopped taking replacement, has not   had overt gi bleeding fobt negative s/p egd with banding of EV , Possible   varix on retroflexion view (GOV2 vs IGV1) noted in GI PN on  by Temi Mccullough APRN - CNP    -EGD ( 2025)- Multiple columns of large esophageal varices, red johnathan   sign, 4 band successfully placed with deflation of varices ,Abnormal   salmon-colored mucosa extending 3 to 4 cm above, biopsies not obtained as   underlying varices. Moderate portal hypertensive gastropathy.    -CT abdomen Pelvis(2025)-Cirrhosis with patent TIPS ,Small volume of   ascites.    -Iron- 25 on  (From Epic lab values)  -Iron % saturation- 10 on  (From Epic lab values)    -ferrous sulfate 325 (65 Fe) MG tablet ,ferric gluconate (FERRLECIT) 125

## 2025-06-17 ENCOUNTER — ANESTHESIA EVENT (OUTPATIENT)
Dept: OPERATING ROOM | Age: 65
End: 2025-06-17
Payer: COMMERCIAL

## 2025-06-18 ENCOUNTER — HOSPITAL ENCOUNTER (OUTPATIENT)
Age: 65
Setting detail: OUTPATIENT SURGERY
Discharge: HOME | End: 2025-06-18
Attending: COLON & RECTAL SURGERY | Admitting: COLON & RECTAL SURGERY
Payer: COMMERCIAL

## 2025-06-18 ENCOUNTER — TELEPHONE (OUTPATIENT)
Dept: GASTROENTEROLOGY | Age: 65
End: 2025-06-18

## 2025-06-18 ENCOUNTER — ANESTHESIA (OUTPATIENT)
Dept: OPERATING ROOM | Age: 65
End: 2025-06-18
Payer: COMMERCIAL

## 2025-06-18 ENCOUNTER — APPOINTMENT (OUTPATIENT)
Dept: GENERAL RADIOLOGY | Age: 65
End: 2025-06-18
Attending: COLON & RECTAL SURGERY
Payer: COMMERCIAL

## 2025-06-18 VITALS
SYSTOLIC BLOOD PRESSURE: 140 MMHG | DIASTOLIC BLOOD PRESSURE: 80 MMHG | RESPIRATION RATE: 17 BRPM | OXYGEN SATURATION: 99 % | TEMPERATURE: 98.8 F | HEART RATE: 80 BPM

## 2025-06-18 PROCEDURE — 6360000002 HC RX W HCPCS: Performed by: STUDENT IN AN ORGANIZED HEALTH CARE EDUCATION/TRAINING PROGRAM

## 2025-06-18 PROCEDURE — 74018 RADEX ABDOMEN 1 VIEW: CPT

## 2025-06-18 PROCEDURE — 3700000001 HC ADD 15 MINUTES (ANESTHESIA): Performed by: COLON & RECTAL SURGERY

## 2025-06-18 PROCEDURE — 3700000000 HC ANESTHESIA ATTENDED CARE: Performed by: COLON & RECTAL SURGERY

## 2025-06-18 PROCEDURE — 7100000010 HC PHASE II RECOVERY - FIRST 15 MIN: Performed by: COLON & RECTAL SURGERY

## 2025-06-18 PROCEDURE — 45330 DIAGNOSTIC SIGMOIDOSCOPY: CPT | Performed by: COLON & RECTAL SURGERY

## 2025-06-18 PROCEDURE — 2709999900 HC NON-CHARGEABLE SUPPLY: Performed by: COLON & RECTAL SURGERY

## 2025-06-18 PROCEDURE — 7100000001 HC PACU RECOVERY - ADDTL 15 MIN: Performed by: COLON & RECTAL SURGERY

## 2025-06-18 PROCEDURE — 3609027000 HC COLONOSCOPY: Performed by: COLON & RECTAL SURGERY

## 2025-06-18 PROCEDURE — 2580000003 HC RX 258: Performed by: STUDENT IN AN ORGANIZED HEALTH CARE EDUCATION/TRAINING PROGRAM

## 2025-06-18 PROCEDURE — 6360000002 HC RX W HCPCS: Performed by: NURSE ANESTHETIST, CERTIFIED REGISTERED

## 2025-06-18 PROCEDURE — 7100000000 HC PACU RECOVERY - FIRST 15 MIN: Performed by: COLON & RECTAL SURGERY

## 2025-06-18 PROCEDURE — 2500000003 HC RX 250 WO HCPCS: Performed by: STUDENT IN AN ORGANIZED HEALTH CARE EDUCATION/TRAINING PROGRAM

## 2025-06-18 RX ORDER — SODIUM CHLORIDE 0.9 % (FLUSH) 0.9 %
5-40 SYRINGE (ML) INJECTION EVERY 12 HOURS SCHEDULED
Status: CANCELLED | OUTPATIENT
Start: 2025-06-18

## 2025-06-18 RX ORDER — PROPOFOL 10 MG/ML
INJECTION, EMULSION INTRAVENOUS
Status: DISCONTINUED | OUTPATIENT
Start: 2025-06-18 | End: 2025-06-18 | Stop reason: SDUPTHER

## 2025-06-18 RX ORDER — HYDRALAZINE HYDROCHLORIDE 20 MG/ML
10 INJECTION INTRAMUSCULAR; INTRAVENOUS
Status: CANCELLED | OUTPATIENT
Start: 2025-06-18

## 2025-06-18 RX ORDER — LABETALOL HYDROCHLORIDE 5 MG/ML
10 INJECTION, SOLUTION INTRAVENOUS
Status: CANCELLED | OUTPATIENT
Start: 2025-06-18

## 2025-06-18 RX ORDER — ONDANSETRON 2 MG/ML
4 INJECTION INTRAMUSCULAR; INTRAVENOUS ONCE
Status: COMPLETED | OUTPATIENT
Start: 2025-06-18 | End: 2025-06-18

## 2025-06-18 RX ORDER — SODIUM CHLORIDE 9 MG/ML
INJECTION, SOLUTION INTRAVENOUS PRN
Status: CANCELLED | OUTPATIENT
Start: 2025-06-18

## 2025-06-18 RX ORDER — FENTANYL CITRATE 50 UG/ML
INJECTION, SOLUTION INTRAMUSCULAR; INTRAVENOUS
Status: DISCONTINUED | OUTPATIENT
Start: 2025-06-18 | End: 2025-06-18 | Stop reason: SDUPTHER

## 2025-06-18 RX ORDER — SODIUM CHLORIDE 9 MG/ML
INJECTION, SOLUTION INTRAVENOUS PRN
Status: DISCONTINUED | OUTPATIENT
Start: 2025-06-18 | End: 2025-06-18 | Stop reason: HOSPADM

## 2025-06-18 RX ORDER — SODIUM CHLORIDE 0.9 % (FLUSH) 0.9 %
5-40 SYRINGE (ML) INJECTION PRN
Status: DISCONTINUED | OUTPATIENT
Start: 2025-06-18 | End: 2025-06-18 | Stop reason: HOSPADM

## 2025-06-18 RX ORDER — SUCCINYLCHOLINE/SOD CL,ISO/PF 100 MG/5ML
SYRINGE (ML) INTRAVENOUS
Status: DISCONTINUED | OUTPATIENT
Start: 2025-06-18 | End: 2025-06-18 | Stop reason: SDUPTHER

## 2025-06-18 RX ORDER — SODIUM CHLORIDE, SODIUM LACTATE, POTASSIUM CHLORIDE, CALCIUM CHLORIDE 600; 310; 30; 20 MG/100ML; MG/100ML; MG/100ML; MG/100ML
INJECTION, SOLUTION INTRAVENOUS CONTINUOUS
Status: DISCONTINUED | OUTPATIENT
Start: 2025-06-18 | End: 2025-06-18 | Stop reason: HOSPADM

## 2025-06-18 RX ORDER — LIDOCAINE HYDROCHLORIDE 10 MG/ML
1 INJECTION, SOLUTION EPIDURAL; INFILTRATION; INTRACAUDAL; PERINEURAL
Status: DISCONTINUED | OUTPATIENT
Start: 2025-06-18 | End: 2025-06-18 | Stop reason: HOSPADM

## 2025-06-18 RX ORDER — LIDOCAINE HYDROCHLORIDE 10 MG/ML
INJECTION, SOLUTION EPIDURAL; INFILTRATION; INTRACAUDAL; PERINEURAL
Status: DISCONTINUED | OUTPATIENT
Start: 2025-06-18 | End: 2025-06-18 | Stop reason: SDUPTHER

## 2025-06-18 RX ORDER — PROCHLORPERAZINE EDISYLATE 5 MG/ML
5 INJECTION INTRAMUSCULAR; INTRAVENOUS
Status: CANCELLED | OUTPATIENT
Start: 2025-06-18

## 2025-06-18 RX ORDER — SODIUM CHLORIDE 0.9 % (FLUSH) 0.9 %
5-40 SYRINGE (ML) INJECTION PRN
Status: CANCELLED | OUTPATIENT
Start: 2025-06-18

## 2025-06-18 RX ORDER — SODIUM CHLORIDE 0.9 % (FLUSH) 0.9 %
5-40 SYRINGE (ML) INJECTION EVERY 12 HOURS SCHEDULED
Status: DISCONTINUED | OUTPATIENT
Start: 2025-06-18 | End: 2025-06-18 | Stop reason: HOSPADM

## 2025-06-18 RX ORDER — NALOXONE HYDROCHLORIDE 0.4 MG/ML
INJECTION, SOLUTION INTRAMUSCULAR; INTRAVENOUS; SUBCUTANEOUS PRN
Status: CANCELLED | OUTPATIENT
Start: 2025-06-18

## 2025-06-18 RX ADMIN — LIDOCAINE HYDROCHLORIDE 50 MG: 10 INJECTION, SOLUTION EPIDURAL; INFILTRATION; INTRACAUDAL; PERINEURAL at 10:35

## 2025-06-18 RX ADMIN — PROPOFOL 160 MG: 10 INJECTION, EMULSION INTRAVENOUS at 10:35

## 2025-06-18 RX ADMIN — FENTANYL CITRATE 100 MCG: 50 INJECTION, SOLUTION INTRAMUSCULAR; INTRAVENOUS at 10:35

## 2025-06-18 RX ADMIN — SODIUM CHLORIDE, SODIUM LACTATE, POTASSIUM CHLORIDE, AND CALCIUM CHLORIDE: .6; .31; .03; .02 INJECTION, SOLUTION INTRAVENOUS at 10:03

## 2025-06-18 RX ADMIN — Medication 80 MG: at 10:35

## 2025-06-18 RX ADMIN — ONDANSETRON 4 MG: 2 INJECTION, SOLUTION INTRAMUSCULAR; INTRAVENOUS at 10:04

## 2025-06-18 RX ADMIN — SODIUM CHLORIDE, PRESERVATIVE FREE 20 MG: 5 INJECTION INTRAVENOUS at 10:04

## 2025-06-18 ASSESSMENT — PAIN - FUNCTIONAL ASSESSMENT
PAIN_FUNCTIONAL_ASSESSMENT: 0-10
PAIN_FUNCTIONAL_ASSESSMENT: NONE - DENIES PAIN

## 2025-06-18 ASSESSMENT — ENCOUNTER SYMPTOMS: SHORTNESS OF BREATH: 1

## 2025-06-18 NOTE — ANESTHESIA PRE PROCEDURE
upper abdomen R10.10    Moderate malnutrition E44.0    Delirium due to another medical condition F05    ROBYN (acute kidney injury) N17.9    Ileostomy in place ([facility]) Z93.2    Peristomal dermatitis associated with moisture L30.8    Cellulitis L03.90    Coffee ground emesis K92.0    Substance abuse ([facility]) F19.10    History of abdominal surgery Z98.890    Status post reversal of ileostomy Z98.890    Neck pain, chronic M54.2, G89.29    Peripheral polyneuropathy G62.9    Cubital tunnel syndrome of both upper extremities G56.23    Intractable nausea and vomiting R11.2    Right lower quadrant abdominal pain R10.31    Nausea R11.0    Elevated CEA R97.0    Abnormal abdominal CT scan R93.5    Generalized weakness R53.1    High carcinoembryonic antigen (CEA) R97.0    Wrist arthritis M19.039    Pain in joint involving right ankle and foot M25.571    Syncope R55    Other microscopic hematuria R31.29    Dizziness R42    Acute pancreatitis without infection or necrosis K85.90    Small bowel obstruction ([facility]) K56.609    Closed fracture of third thoracic vertebra ([facility]) S22.039A    Age-related osteoporosis with current pathological fracture with routine healing M80.00XD    Acute midline thoracic back pain M54.6    T12 compression fracture, sequela S22.080S    AMS (altered mental status) R41.82    Dysphagia, oropharyngeal phase R13.12    Iron deficiency anemia due to chronic blood loss D50.0    Closed compression fracture of L1 lumbar vertebra, sequela S32.010S    Alcohol abuse with withdrawal ([facility]) F10.139    Closed wedge compression fracture of T10 vertebra ([facility]) S22.070A    Avascular necrosis of left femur ([facility]) M87.052    Spinal stenosis in cervical region M48.02    Non-traumatic compression fracture of T11 thoracic vertebra, sequela M48.54XS    Closed compression fracture of L4 lumbar vertebra, initial encounter ([facility]) S32.040A    Compression fracture of T10 vertebra ([facility]) S22.070A    Compression fracture of L3 lumbar vertebra,

## 2025-06-18 NOTE — CONSULTS
Consult order placed to Vascular Access Team for PIV access in pre-op. Writer called and spoke with JERI Redmond. Nyla states that the access team has already arrived, however, this is not the [facility] Vascular Access Team. Outside service used to place peripheral venous access. No current needs at this time.

## 2025-06-18 NOTE — INTERVAL H&P NOTE
Update History & Physical    The patient's History and Physical of May 21, 2025 was reviewed with the patient and I examined the patient. There was no change. The surgical site was confirmed by the patient and me.     Plan: The risks, benefits, expected outcome, and alternative to the recommended procedure have been discussed with the patient. Patient understands and wants to proceed with the procedure.     Electronically signed by Jose A Coleman MD on 6/18/2025 at 9:53 AM

## 2025-06-18 NOTE — DISCHARGE INSTRUCTIONS
Colonoscopy / Sigmoidoscopy Discharge Instructions  Jose A Coleman M.D.    You may have a regular diet unless instructed otherwise and if you do not have any abdominal (belly) pains, nausea, vomiting or fever.    No driving, operating machinery or making important decisions for the next 24 hours.    It is normal to feel distended or full in the abdomen; passing gas will help relieve the fullness.  The fullness is from the air put inside your bowel (colon) during the procedure.    It is normal to pass small amounts of blood after the colonoscopy.    Findings today included:  Circumferential but nonobstructive mass at 58 cm from anal opening which is consistent with the gastroenterologist findings.  Intraoperative x-ray was taken with the colonoscopy tip placed next to the tumor.  Tumor location was in proximal descending or distal transverse colon.    Medications:   No changes to home medication therapy.     We will recommend improvement of liver function and consideration of redoing the TIPS prior to embarking on the surgery.  We will work with the gastroenterologist to discuss the plan.    Schedule a follow up visit as scheduled..    Below are abnormal symptoms which will need medical attention; please call the office at any time if these occur.  Large amount of bleeding through the rectum   Severe abdominal pain or pain in the belly (it is normal to have occasional mild discomfort).  Fever of 101F or above, chills or excessive sweating.  Persistent nausea or vomiting.

## 2025-06-18 NOTE — OP NOTE
Operative Report    Pre-operative Diagnosis: Malignant tumor at 58 cm from anal verge    Post-operative Diagnosis: Same as above    Procedure: Partial colonoscopy with intraoperative x-ray.    Surgeon: AKIRA Coleman  Assistant: RN/ Technician    Type of anesthesia: General anesthesia administered by anesthesiologist    Complications: None    Estimated blood loss: None    Bowel Prep Quality:    [x]  Good     Findings:      [x] Mass/ Tumor: Circumferential but nonobstructive tumor noted at 58 cm consistent with the findings of the gastroenterologist who did his colonoscopy.  Intraoperative flatplate x-ray was taken with the patient on his back and the colonoscopy tip at the site of the tumor.  X-ray showed this to be in proximal descending or distal transverse colon.    Specimens:   * No specimens in log *    Implants:  * No implants in log *      Drains:  * No LDAs found *    Details: The patient was placed in left lateral position on the operating table. After the initiation of intravenous sedatives by the anesthesiologist, digital rectal exam was done, and a flexible pediatric colonoscope of variable stiffness was inserted per rectum and advanced all the way to the tumor..  Tattoo aren was noted distal to the tumor.  At this point a plain x-ray was taken with the patient in supine position to localize tomographically the position of the tumor  Following the x-ray the scope was withdrawn and the patient reversed from anesthesia.    Clinical Impression: Circumferential but nonobstructive malignant mass located at 58 cm from anal verge most likely in the proximal descending or distal transverse colon.    Clinical Recommendation: Optimization of patient's liver dysfunction followed by surgical intervention.    Electronically signed by Jose A Coleman MD on 6/18/2025 at 10:54 AM

## 2025-06-18 NOTE — ANESTHESIA POSTPROCEDURE EVALUATION
Department of Anesthesiology  Postprocedure Note    Patient: Frank Lisa  MRN: [id number]  YOB: 1960  Date of evaluation: 6/18/2025    Procedure Summary       Date: 06/18/25 Room / Location: [facility] PROCEDURE ROOM / [facility]    Anesthesia Start: 1032 Anesthesia Stop: 1103    Procedure: COLONOSCOPY DIAGNOSTIC Diagnosis:       Adenocarcinoma of sigmoid colon (HCC)      (Adenocarcinoma of sigmoid colon (HCC) [C18.7])    Surgeons: Jose A Coleman MD Responsible Provider: Bradley Davis MD    Anesthesia Type: General ASA Status: 3            Anesthesia Type: General    Yen Phase I: Yen Score: 8    Yen Phase II:      Anesthesia Post Evaluation    Patient location during evaluation: PACU  Patient participation: complete - patient participated  Level of consciousness: awake and awake and alert  Pain score: 0  Nausea & Vomiting: no nausea and no vomiting  Cardiovascular status: hemodynamically stable and blood pressure returned to baseline  Respiratory status: acceptable  Hydration status: euvolemic  Multimodal analgesia pain management approach  Pain management: adequate    No notable events documented.

## 2025-06-18 NOTE — TELEPHONE ENCOUNTER
Patient had a diagnostic colonoscopy today with Dr Coleman and was advised to follow up with Dr Olivia as soon as possible.  Patient had fluid drained last week and wife states he has filled up with fluid and is requesting another aspiration.  Patient previously had a standing order for Paracentesis and is asking if another order could be placed. She is also stating he many need another TIPS procedure.    Please return the call to [place] as soon as possible as Frank is very uncomfortable.    Thank you.

## 2025-06-19 DIAGNOSIS — I85.10 SECONDARY ESOPHAGEAL VARICES WITHOUT BLEEDING (HCC): Primary | ICD-10-CM

## 2025-06-19 DIAGNOSIS — K74.69 OTHER CIRRHOSIS OF LIVER (HCC): ICD-10-CM

## 2025-06-19 NOTE — TELEPHONE ENCOUNTER
Colonoscopy completed Dr Grover 6/18/25 Clinical Impression: Circumferential but nonobstructive malignant mass located at 58 cm from anal verge most likely in the proximal descending or distal transverse colon., extensive GI history with malignancy, cirrhosis of the liver with ascites, paracentesis performed last week, pt requesting new order placed for paracentesis-experiencing discomfort/ascites build up. Ok to place standing order for paracentesis? Pt also inquiring about repeat TIPS procedure?

## 2025-06-19 NOTE — TELEPHONE ENCOUNTER
Writer returned call to Nilam in regards to pt, no answer lvm advising this was discussed with provider & new orders in system for paracentesis & TIPS revision, gave central scheduling P#[phone] & left our office call back number if needed.

## 2025-06-27 ENCOUNTER — HOSPITAL ENCOUNTER (OUTPATIENT)
Dept: INTERVENTIONAL RADIOLOGY/VASCULAR | Age: 65
Discharge: HOME | End: 2025-06-29
Payer: COMMERCIAL

## 2025-06-27 VITALS
HEART RATE: 86 BPM | WEIGHT: 227 LBS | SYSTOLIC BLOOD PRESSURE: 121 MMHG | TEMPERATURE: 97.9 F | BODY MASS INDEX: 32.57 KG/M2 | OXYGEN SATURATION: 100 % | DIASTOLIC BLOOD PRESSURE: 86 MMHG | RESPIRATION RATE: 16 BRPM

## 2025-06-27 DIAGNOSIS — I85.10 SECONDARY ESOPHAGEAL VARICES WITHOUT BLEEDING (HCC): ICD-10-CM

## 2025-06-27 PROCEDURE — 7100000010 HC PHASE II RECOVERY - FIRST 15 MIN

## 2025-06-27 PROCEDURE — 7100000011 HC PHASE II RECOVERY - ADDTL 15 MIN

## 2025-06-27 PROCEDURE — 2709999900 IR US GUIDED PARACENTESIS

## 2025-06-27 PROCEDURE — P9047 ALBUMIN (HUMAN), 25%, 50ML: HCPCS | Performed by: PHYSICIAN ASSISTANT

## 2025-06-27 PROCEDURE — 6360000002 HC RX W HCPCS: Performed by: PHYSICIAN ASSISTANT

## 2025-06-27 PROCEDURE — 49083 ABD PARACENTESIS W/IMAGING: CPT

## 2025-06-27 RX ORDER — ALBUMIN (HUMAN) 12.5 G/50ML
50 SOLUTION INTRAVENOUS ONCE
Status: COMPLETED | OUTPATIENT
Start: 2025-06-27 | End: 2025-06-27

## 2025-06-27 RX ADMIN — ALBUMIN (HUMAN) 50 G: 0.25 INJECTION, SOLUTION INTRAVENOUS at 15:35

## 2025-06-27 ASSESSMENT — PAIN - FUNCTIONAL ASSESSMENT
PAIN_FUNCTIONAL_ASSESSMENT: NONE - DENIES PAIN
PAIN_FUNCTIONAL_ASSESSMENT: NONE - DENIES PAIN

## 2025-06-27 ASSESSMENT — ENCOUNTER SYMPTOMS
SINUS PRESSURE: 0
ABDOMINAL PAIN: 0
NAUSEA: 0
SHORTNESS OF BREATH: 0

## 2025-06-27 NOTE — PROGRESS NOTES
Patient tolerated paracentesis without distress. 5000 ml of clear, clear, yellow fluid removed. Dressing to site. Patient returned to room. Nurse updated.

## 2025-06-27 NOTE — BRIEF OP NOTE
Brief Postoperative Note for Paracentesis    Frank Lisa  YOB: 1960  [id number]    Pre-operative Diagnosis:  Ascites     Post-operative Diagnosis: Same    Procedure: Ultrasound guided paracentesis     Anesthesia: 1% Lidocaine     Surgeons/Assistants: Colette Piña PA-C    Complications: none    EBL: Minimal    Specimens: Were obtained    Ultrasound guided paracentesis performed the right abdomen. 5 liters of non-turbid yellow fluid obtained.  Dressing applied. Patient tolerated procedure well. 50 g IV albumin ordered.    Electronically signed by Colette Piña PA-C on 6/27/2025 at 3:27 PM

## 2025-06-27 NOTE — H&P
HISTORY and PHYSICAL  [facility]       NAME:  Frank Lisa  MRN: [id number]   YOB: 1960   Date: 6/27/2025   Age: 64 y.o.  Gender: male       COMPLAINT AND PRESENT HISTORY:     Frank Lisa is 64 y.o.,  male, here today for IR US Guided Paracentesis.   Patient has history of Alcoholic cirrhosis of liver with ascites     Oncology office visit per  Dr. Durán on 6/13/25  HISTORY OF PRESENT ILLNESS:    Oncologic History:  Frank Lisa is a 64 y.o. male with a history of hepatitis C, status post treatment, liver cirrhosis, history of alcohol abuse was seen during initial consultation visit for newly diagnosed esophageal cancer.  Patient reportedly had \"heavy drinking alcohol in about 2016 however recently in December he had 2 beers and he presented with hematemesis.  On 12/29/2020 he had upper endoscopy which showed severe portal hypertension, gastropathy.  The biopsy from the GE junction showed invasive adenocarcinoma.  Patient had a follow-up EGD on 2/2/2021 which confirmed esophageal adenocarcinoma.  Patient had endoscopic ultrasound on 2/12/2021 which showed T2 N0 MX disease with underlying esophageal varices.  In the esophagus hypoechoic lesion noted in the lower esophagus penetrating into submucosa and into muscularis propria.  No lymphadenopathy noted.  Patient was referred to medical oncology for further recommendations.   He denies any difficulty swallowing.  He does not smoke he has quit smoking in 2016.  He has not drank alcohol since December.  He has a history of hepatitis C and he has received treatment.   He lives by himself.  He is accompanied by his ex-wife during today's office visit.     Follow up office note per Dr. Olivia on 5/01/25  HISTORY OF PRESENT ILLNESS: Mr.Mark ARACELIS Lisa is a 64 y.o. male with a past history remarkable for cirrhosis secondary to EtOH abuse complicated by EV, recurrent ascites status post TIPS, TALA, hepatitis C treated with Harvoni,

## 2025-06-27 NOTE — DISCHARGE INSTRUCTIONS
DISCHARGE INSTRUCTIONS FOR PARACENTESIS    In order to continue your care at home, please follow the instructions below.    Medications    Use over-the-counter pain medication as directed on bottle for pain control    Post Procedure Site/Care/Activity  For up to 2 days after the procedure, you may have a small amount of clear fluid coming out of the site where the needle was inserted, especially if you had a lot of fluid removed. You may need to change the bandage on the site.    Do not lie totally flat until morning.  You can do your normal activities after the procedure, unless instructed differently.      Call your doctor or seek immediate medical care if:   You have symptoms of infection, such as increased pain, swelling, warmth, or redness, red streaks or pus.  An oral temperature (by mouth) is 101 degrees or higher (fever), chills, fever.  You are dizzy or lightheaded, or you feel like you may faint.   You have new or worse belly pain.    Watch closely for changes in your health, and be sure to contact your doctor if:   Fluid builds up in your belly again.   You do not get better as expected.      IF YOU CANNOT REACH THE DOCTOR, GO TO THE NEAREST EMERGENCY ROOM OR CALL 911    Phone: Interventional Radiology   [phone] Dr. Escobedo  After hours Radiology   [phone]       5 Liters removed    227lb before procedure  219.7lb after

## 2025-07-10 ENCOUNTER — TELEPHONE (OUTPATIENT)
Dept: GASTROENTEROLOGY | Age: 65
End: 2025-07-10

## 2025-07-10 DIAGNOSIS — K70.31 ASCITES DUE TO ALCOHOLIC CIRRHOSIS (HCC): Primary | ICD-10-CM

## 2025-07-10 NOTE — TELEPHONE ENCOUNTER
Ex wife called stating that patient is supposed to get a procedure done next Wednesday. However patient is having increased abdominal distention. Would like to know if patient should get a paracentesis prior to procedure or not. After speaking with Provider, it is advised that patient does get paracentesis if he is having abdominal distention.

## 2025-07-10 NOTE — TELEPHONE ENCOUNTER
Return call to Nilam and patient in that yes Dr Olivia would like patient to have paracentesis with patient having abdominal distention prior to Procedure. Writer noted the order has been placed and that central scheduling can be called to schedule at [phone]. Nilam and patient thanked writer. Please advise.

## 2025-07-15 ENCOUNTER — HOSPITAL ENCOUNTER (OUTPATIENT)
Dept: ULTRASOUND IMAGING | Age: 65
Discharge: HOME | End: 2025-07-17
Attending: INTERNAL MEDICINE
Payer: COMMERCIAL

## 2025-07-15 ENCOUNTER — HOSPITAL ENCOUNTER (OUTPATIENT)
Dept: VASCULAR LAB | Age: 65
Discharge: HOME | End: 2025-07-17
Attending: INTERNAL MEDICINE
Payer: COMMERCIAL

## 2025-07-15 DIAGNOSIS — I85.10 SECONDARY ESOPHAGEAL VARICES WITHOUT BLEEDING (HCC): ICD-10-CM

## 2025-07-15 DIAGNOSIS — K70.30 ESOPHAGEAL VARICES IN ALCOHOLIC CIRRHOSIS (HCC): Primary | ICD-10-CM

## 2025-07-15 DIAGNOSIS — K74.69 CIRRHOSIS, CRYPTOGENIC (HCC): ICD-10-CM

## 2025-07-15 DIAGNOSIS — I85.10 ESOPHAGEAL VARICES IN ALCOHOLIC CIRRHOSIS (HCC): ICD-10-CM

## 2025-07-15 DIAGNOSIS — I85.10 ESOPHAGEAL VARICES IN ALCOHOLIC CIRRHOSIS (HCC): Primary | ICD-10-CM

## 2025-07-15 DIAGNOSIS — K70.30 ESOPHAGEAL VARICES IN ALCOHOLIC CIRRHOSIS (HCC): ICD-10-CM

## 2025-07-15 DIAGNOSIS — K74.69 OTHER CIRRHOSIS OF LIVER (HCC): ICD-10-CM

## 2025-07-15 PROCEDURE — 76705 ECHO EXAM OF ABDOMEN: CPT

## 2025-07-23 ENCOUNTER — APPOINTMENT (OUTPATIENT)
Dept: GENERAL RADIOLOGY | Age: 65
DRG: 432 | End: 2025-07-23
Payer: COMMERCIAL

## 2025-07-23 ENCOUNTER — ANESTHESIA EVENT (OUTPATIENT)
Dept: ENDOSCOPY | Age: 65
End: 2025-07-23
Payer: COMMERCIAL

## 2025-07-23 ENCOUNTER — ANESTHESIA (OUTPATIENT)
Dept: ENDOSCOPY | Age: 65
End: 2025-07-23
Payer: COMMERCIAL

## 2025-07-23 ENCOUNTER — HOSPITAL ENCOUNTER (INPATIENT)
Age: 65
LOS: 3 days | Discharge: HOME HEALTH | DRG: 432 | End: 2025-07-26
Attending: EMERGENCY MEDICINE | Admitting: INTERNAL MEDICINE
Payer: COMMERCIAL

## 2025-07-23 ENCOUNTER — APPOINTMENT (OUTPATIENT)
Dept: CT IMAGING | Age: 65
DRG: 432 | End: 2025-07-23
Payer: COMMERCIAL

## 2025-07-23 DIAGNOSIS — R18.8 REFRACTORY ASCITES: ICD-10-CM

## 2025-07-23 DIAGNOSIS — K92.0 HEMATEMESIS WITH NAUSEA: Primary | ICD-10-CM

## 2025-07-23 DIAGNOSIS — I48.91 ATRIAL FIBRILLATION, UNSPECIFIED TYPE (HCC): ICD-10-CM

## 2025-07-23 DIAGNOSIS — R79.89 ELEVATED LFTS: ICD-10-CM

## 2025-07-23 DIAGNOSIS — K70.30 ALCOHOLIC CIRRHOSIS, UNSPECIFIED WHETHER ASCITES PRESENT (HCC): ICD-10-CM

## 2025-07-23 PROBLEM — Z85.01 HISTORY OF ESOPHAGEAL CANCER: Status: ACTIVE | Noted: 2025-07-23

## 2025-07-23 PROBLEM — C18.9 COLON ADENOCARCINOMA (HCC): Status: ACTIVE | Noted: 2025-06-02

## 2025-07-23 LAB
ABO + RH BLD: NORMAL
ALBUMIN SERPL-MCNC: 2.7 G/DL (ref 3.5–5.2)
ALP SERPL-CCNC: 123 U/L (ref 40–129)
ALT SERPL-CCNC: 14 U/L (ref 10–50)
ANION GAP SERPL CALCULATED.3IONS-SCNC: 15 MMOL/L (ref 9–16)
ARM BAND NUMBER: NORMAL
AST SERPL-CCNC: 58 U/L (ref 10–50)
BASOPHILS # BLD: 0 K/UL (ref 0–0.2)
BASOPHILS NFR BLD: 0 % (ref 0–2)
BILIRUB DIRECT SERPL-MCNC: 1.4 MG/DL (ref 0–0.3)
BILIRUB INDIRECT SERPL-MCNC: 2 MG/DL (ref 0–1)
BILIRUB SERPL-MCNC: 3.4 MG/DL (ref 0–1.2)
BLOOD BANK SAMPLE EXPIRATION: NORMAL
BLOOD GROUP ANTIBODIES SERPL: NEGATIVE
BUN SERPL-MCNC: 7 MG/DL (ref 8–23)
CALCIUM SERPL-MCNC: 8.5 MG/DL (ref 8.6–10.4)
CHLORIDE SERPL-SCNC: 102 MMOL/L (ref 98–107)
CO2 SERPL-SCNC: 21 MMOL/L (ref 20–31)
CREAT SERPL-MCNC: 1.1 MG/DL (ref 0.7–1.2)
EKG ATRIAL RATE: 106 BPM
EKG P-R INTERVAL: 160 MS
EKG Q-T INTERVAL: 358 MS
EKG QRS DURATION: 78 MS
EKG QTC CALCULATION (BAZETT): 475 MS
EKG R AXIS: 12 DEGREES
EKG T AXIS: -15 DEGREES
EKG VENTRICULAR RATE: 106 BPM
EOSINOPHIL # BLD: 0 K/UL (ref 0–0.4)
EOSINOPHILS RELATIVE PERCENT: 0 % (ref 0–4)
ERYTHROCYTE [DISTWIDTH] IN BLOOD BY AUTOMATED COUNT: 19.1 % (ref 11.5–14.9)
GFR, ESTIMATED: 74 ML/MIN/1.73M2
GLUCOSE SERPL-MCNC: 102 MG/DL (ref 74–99)
HCT VFR BLD AUTO: 31.2 % (ref 41–53)
HGB BLD-MCNC: 9.3 G/DL (ref 13.5–17.5)
IMM GRANULOCYTES # BLD AUTO: 0 K/UL (ref 0–0.3)
IMM GRANULOCYTES NFR BLD: 0 %
INR PPP: 1.3
LIPASE SERPL-CCNC: 19 U/L (ref 13–60)
LYMPHOCYTES NFR BLD: 0.46 K/UL (ref 1–4.8)
LYMPHOCYTES RELATIVE PERCENT: 7 % (ref 24–44)
MAGNESIUM SERPL-MCNC: 1.2 MG/DL (ref 1.6–2.4)
MAGNESIUM SERPL-MCNC: 1.6 MG/DL (ref 1.6–2.4)
MCH RBC QN AUTO: 25 PG (ref 26–34)
MCHC RBC AUTO-ENTMCNC: 29.8 G/DL (ref 31–37)
MCV RBC AUTO: 83.9 FL (ref 80–100)
MONOCYTES NFR BLD: 0.79 K/UL (ref 0.1–1.3)
MONOCYTES NFR BLD: 12 % (ref 1–7)
MORPHOLOGY: ABNORMAL
NEUTROPHILS NFR BLD: 81 % (ref 36–66)
NEUTS SEG NFR BLD: 5.35 K/UL (ref 1.3–9.1)
NRBC BLD-RTO: 0 PER 100 WBC
PARTIAL THROMBOPLASTIN TIME: 34.7 SEC (ref 24–36)
PHOSPHATE SERPL-MCNC: 2.4 MG/DL (ref 2.5–4.5)
PLATELET # BLD AUTO: 104 K/UL (ref 150–450)
PMV BLD AUTO: 9.3 FL (ref 8–13.5)
POTASSIUM SERPL-SCNC: 3.7 MMOL/L (ref 3.7–5.3)
PROT SERPL-MCNC: 5.9 G/DL (ref 6.6–8.7)
PROTHROMBIN TIME: 17.6 SEC (ref 11.8–14.6)
RBC # BLD AUTO: 3.72 M/UL (ref 4.21–5.77)
SODIUM SERPL-SCNC: 138 MMOL/L (ref 136–145)
TROPONIN I SERPL HS-MCNC: 13 NG/L (ref 0–22)
TROPONIN I SERPL HS-MCNC: 14 NG/L (ref 0–22)
WBC OTHER # BLD: 6.6 K/UL (ref 3.5–11)

## 2025-07-23 PROCEDURE — 71045 X-RAY EXAM CHEST 1 VIEW: CPT

## 2025-07-23 PROCEDURE — 6360000002 HC RX W HCPCS: Performed by: INTERNAL MEDICINE

## 2025-07-23 PROCEDURE — 3700000000 HC ANESTHESIA ATTENDED CARE: Performed by: INTERNAL MEDICINE

## 2025-07-23 PROCEDURE — 3700000001 HC ADD 15 MINUTES (ANESTHESIA): Performed by: INTERNAL MEDICINE

## 2025-07-23 PROCEDURE — 74176 CT ABD & PELVIS W/O CONTRAST: CPT

## 2025-07-23 PROCEDURE — 96375 TX/PRO/DX INJ NEW DRUG ADDON: CPT

## 2025-07-23 PROCEDURE — 84484 ASSAY OF TROPONIN QUANT: CPT

## 2025-07-23 PROCEDURE — 43244 EGD VARICES LIGATION: CPT | Performed by: INTERNAL MEDICINE

## 2025-07-23 PROCEDURE — 2580000003 HC RX 258: Performed by: INTERNAL MEDICINE

## 2025-07-23 PROCEDURE — 2500000003 HC RX 250 WO HCPCS: Performed by: INTERNAL MEDICINE

## 2025-07-23 PROCEDURE — 6370000000 HC RX 637 (ALT 250 FOR IP): Performed by: INTERNAL MEDICINE

## 2025-07-23 PROCEDURE — 6370000000 HC RX 637 (ALT 250 FOR IP): Performed by: NURSE ANESTHETIST, CERTIFIED REGISTERED

## 2025-07-23 PROCEDURE — APPNB60 APP NON BILLABLE TIME 46-60 MINS: Performed by: NURSE PRACTITIONER

## 2025-07-23 PROCEDURE — 85610 PROTHROMBIN TIME: CPT

## 2025-07-23 PROCEDURE — 80076 HEPATIC FUNCTION PANEL: CPT

## 2025-07-23 PROCEDURE — 84100 ASSAY OF PHOSPHORUS: CPT

## 2025-07-23 PROCEDURE — 06L38CZ OCCLUSION OF ESOPHAGEAL VEIN WITH EXTRALUMINAL DEVICE, VIA NATURAL OR ARTIFICIAL OPENING ENDOSCOPIC: ICD-10-PCS | Performed by: INTERNAL MEDICINE

## 2025-07-23 PROCEDURE — 93005 ELECTROCARDIOGRAM TRACING: CPT | Performed by: INTERNAL MEDICINE

## 2025-07-23 PROCEDURE — 83690 ASSAY OF LIPASE: CPT

## 2025-07-23 PROCEDURE — 93010 ELECTROCARDIOGRAM REPORT: CPT | Performed by: INTERNAL MEDICINE

## 2025-07-23 PROCEDURE — 99223 1ST HOSP IP/OBS HIGH 75: CPT | Performed by: INTERNAL MEDICINE

## 2025-07-23 PROCEDURE — 80048 BASIC METABOLIC PNL TOTAL CA: CPT

## 2025-07-23 PROCEDURE — 6360000002 HC RX W HCPCS: Performed by: EMERGENCY MEDICINE

## 2025-07-23 PROCEDURE — 2709999900 HC NON-CHARGEABLE SUPPLY: Performed by: INTERNAL MEDICINE

## 2025-07-23 PROCEDURE — 2580000003 HC RX 258: Performed by: EMERGENCY MEDICINE

## 2025-07-23 PROCEDURE — 2580000003 HC RX 258: Performed by: NURSE ANESTHETIST, CERTIFIED REGISTERED

## 2025-07-23 PROCEDURE — 7100000000 HC PACU RECOVERY - FIRST 15 MIN: Performed by: INTERNAL MEDICINE

## 2025-07-23 PROCEDURE — 6360000002 HC RX W HCPCS: Performed by: ANESTHESIOLOGY

## 2025-07-23 PROCEDURE — 6360000002 HC RX W HCPCS: Performed by: NURSE ANESTHETIST, CERTIFIED REGISTERED

## 2025-07-23 PROCEDURE — 2720000010 HC SURG SUPPLY STERILE: Performed by: INTERNAL MEDICINE

## 2025-07-23 PROCEDURE — 86901 BLOOD TYPING SEROLOGIC RH(D): CPT

## 2025-07-23 PROCEDURE — 93005 ELECTROCARDIOGRAM TRACING: CPT | Performed by: EMERGENCY MEDICINE

## 2025-07-23 PROCEDURE — 96374 THER/PROPH/DIAG INJ IV PUSH: CPT

## 2025-07-23 PROCEDURE — 86850 RBC ANTIBODY SCREEN: CPT

## 2025-07-23 PROCEDURE — 83735 ASSAY OF MAGNESIUM: CPT

## 2025-07-23 PROCEDURE — 36415 COLL VENOUS BLD VENIPUNCTURE: CPT

## 2025-07-23 PROCEDURE — 85025 COMPLETE CBC W/AUTO DIFF WBC: CPT

## 2025-07-23 PROCEDURE — 2500000003 HC RX 250 WO HCPCS: Performed by: EMERGENCY MEDICINE

## 2025-07-23 PROCEDURE — 3609012300 HC EGD BAND LIGATION ESOPHGEAL/GASTRIC VARICES: Performed by: INTERNAL MEDICINE

## 2025-07-23 PROCEDURE — 7100000001 HC PACU RECOVERY - ADDTL 15 MIN: Performed by: INTERNAL MEDICINE

## 2025-07-23 PROCEDURE — 99285 EMERGENCY DEPT VISIT HI MDM: CPT

## 2025-07-23 PROCEDURE — 86900 BLOOD TYPING SEROLOGIC ABO: CPT

## 2025-07-23 PROCEDURE — 85730 THROMBOPLASTIN TIME PARTIAL: CPT

## 2025-07-23 PROCEDURE — 2060000000 HC ICU INTERMEDIATE R&B

## 2025-07-23 RX ORDER — ACETAMINOPHEN 650 MG/1
650 SUPPOSITORY RECTAL EVERY 6 HOURS PRN
Status: DISCONTINUED | OUTPATIENT
Start: 2025-07-23 | End: 2025-07-26 | Stop reason: HOSPADM

## 2025-07-23 RX ORDER — SODIUM CHLORIDE 9 MG/ML
INJECTION, SOLUTION INTRAVENOUS PRN
Status: DISCONTINUED | OUTPATIENT
Start: 2025-07-23 | End: 2025-07-26 | Stop reason: HOSPADM

## 2025-07-23 RX ORDER — POTASSIUM CHLORIDE 7.45 MG/ML
10 INJECTION INTRAVENOUS PRN
Status: DISCONTINUED | OUTPATIENT
Start: 2025-07-23 | End: 2025-07-26 | Stop reason: HOSPADM

## 2025-07-23 RX ORDER — POTASSIUM CHLORIDE 1500 MG/1
40 TABLET, EXTENDED RELEASE ORAL PRN
Status: DISCONTINUED | OUTPATIENT
Start: 2025-07-23 | End: 2025-07-26 | Stop reason: HOSPADM

## 2025-07-23 RX ORDER — SODIUM CHLORIDE 0.9 % (FLUSH) 0.9 %
5-40 SYRINGE (ML) INJECTION EVERY 12 HOURS SCHEDULED
Status: DISCONTINUED | OUTPATIENT
Start: 2025-07-23 | End: 2025-07-26 | Stop reason: HOSPADM

## 2025-07-23 RX ORDER — LABETALOL HYDROCHLORIDE 5 MG/ML
10 INJECTION, SOLUTION INTRAVENOUS
Status: DISCONTINUED | OUTPATIENT
Start: 2025-07-23 | End: 2025-07-23 | Stop reason: HOSPADM

## 2025-07-23 RX ORDER — NADOLOL 20 MG/1
20 TABLET ORAL 2 TIMES DAILY
Status: DISCONTINUED | OUTPATIENT
Start: 2025-07-23 | End: 2025-07-26 | Stop reason: HOSPADM

## 2025-07-23 RX ORDER — FENTANYL CITRATE 0.05 MG/ML
25 INJECTION, SOLUTION INTRAMUSCULAR; INTRAVENOUS EVERY 5 MIN PRN
Status: DISCONTINUED | OUTPATIENT
Start: 2025-07-23 | End: 2025-07-23 | Stop reason: HOSPADM

## 2025-07-23 RX ORDER — LIDOCAINE HYDROCHLORIDE 20 MG/ML
5 SOLUTION OROPHARYNGEAL ONCE
Status: COMPLETED | OUTPATIENT
Start: 2025-07-23 | End: 2025-07-23

## 2025-07-23 RX ORDER — DIPHENHYDRAMINE HYDROCHLORIDE 50 MG/ML
12.5 INJECTION, SOLUTION INTRAMUSCULAR; INTRAVENOUS
Status: DISCONTINUED | OUTPATIENT
Start: 2025-07-23 | End: 2025-07-23 | Stop reason: HOSPADM

## 2025-07-23 RX ORDER — SUCRALFATE 1 G/1
1 TABLET ORAL
Status: DISCONTINUED | OUTPATIENT
Start: 2025-07-23 | End: 2025-07-26 | Stop reason: HOSPADM

## 2025-07-23 RX ORDER — 0.9 % SODIUM CHLORIDE 0.9 %
1000 INTRAVENOUS SOLUTION INTRAVENOUS ONCE
Status: COMPLETED | OUTPATIENT
Start: 2025-07-23 | End: 2025-07-23

## 2025-07-23 RX ORDER — POLYETHYLENE GLYCOL 3350 17 G/17G
17 POWDER, FOR SOLUTION ORAL DAILY PRN
Status: DISCONTINUED | OUTPATIENT
Start: 2025-07-23 | End: 2025-07-26 | Stop reason: HOSPADM

## 2025-07-23 RX ORDER — GABAPENTIN 100 MG/1
100 CAPSULE ORAL 2 TIMES DAILY
Status: DISCONTINUED | OUTPATIENT
Start: 2025-07-23 | End: 2025-07-26 | Stop reason: HOSPADM

## 2025-07-23 RX ORDER — LIDOCAINE HYDROCHLORIDE 40 MG/ML
SOLUTION TOPICAL
Status: DISCONTINUED | OUTPATIENT
Start: 2025-07-23 | End: 2025-07-23 | Stop reason: SDUPTHER

## 2025-07-23 RX ORDER — ACETAMINOPHEN 325 MG/1
650 TABLET ORAL EVERY 6 HOURS PRN
Status: DISCONTINUED | OUTPATIENT
Start: 2025-07-23 | End: 2025-07-26 | Stop reason: HOSPADM

## 2025-07-23 RX ORDER — LIDOCAINE HYDROCHLORIDE 20 MG/ML
INJECTION, SOLUTION EPIDURAL; INFILTRATION; INTRACAUDAL; PERINEURAL
Status: DISCONTINUED | OUTPATIENT
Start: 2025-07-23 | End: 2025-07-23 | Stop reason: SDUPTHER

## 2025-07-23 RX ORDER — SODIUM CHLORIDE 0.9 % (FLUSH) 0.9 %
5-40 SYRINGE (ML) INJECTION PRN
Status: DISCONTINUED | OUTPATIENT
Start: 2025-07-23 | End: 2025-07-26 | Stop reason: HOSPADM

## 2025-07-23 RX ORDER — METOPROLOL TARTRATE 1 MG/ML
5 INJECTION, SOLUTION INTRAVENOUS ONCE
Status: COMPLETED | OUTPATIENT
Start: 2025-07-23 | End: 2025-07-23

## 2025-07-23 RX ORDER — SODIUM CHLORIDE 9 MG/ML
INJECTION, SOLUTION INTRAVENOUS CONTINUOUS
Status: ACTIVE | OUTPATIENT
Start: 2025-07-23 | End: 2025-07-24

## 2025-07-23 RX ORDER — HYDRALAZINE HYDROCHLORIDE 20 MG/ML
10 INJECTION INTRAMUSCULAR; INTRAVENOUS
Status: DISCONTINUED | OUTPATIENT
Start: 2025-07-23 | End: 2025-07-23 | Stop reason: HOSPADM

## 2025-07-23 RX ORDER — SODIUM CHLORIDE 0.9 % (FLUSH) 0.9 %
5-40 SYRINGE (ML) INJECTION EVERY 12 HOURS SCHEDULED
Status: DISCONTINUED | OUTPATIENT
Start: 2025-07-23 | End: 2025-07-23 | Stop reason: HOSPADM

## 2025-07-23 RX ORDER — ONDANSETRON 2 MG/ML
4 INJECTION INTRAMUSCULAR; INTRAVENOUS
Status: COMPLETED | OUTPATIENT
Start: 2025-07-23 | End: 2025-07-23

## 2025-07-23 RX ORDER — SODIUM CHLORIDE 0.9 % (FLUSH) 0.9 %
5-40 SYRINGE (ML) INJECTION PRN
Status: DISCONTINUED | OUTPATIENT
Start: 2025-07-23 | End: 2025-07-23 | Stop reason: HOSPADM

## 2025-07-23 RX ORDER — MEPERIDINE HYDROCHLORIDE 25 MG/ML
12.5 INJECTION INTRAMUSCULAR; INTRAVENOUS; SUBCUTANEOUS EVERY 5 MIN PRN
Status: DISCONTINUED | OUTPATIENT
Start: 2025-07-23 | End: 2025-07-23 | Stop reason: HOSPADM

## 2025-07-23 RX ORDER — MAGNESIUM HYDROXIDE/ALUMINUM HYDROXICE/SIMETHICONE 120; 1200; 1200 MG/30ML; MG/30ML; MG/30ML
30 SUSPENSION ORAL ONCE
Status: COMPLETED | OUTPATIENT
Start: 2025-07-23 | End: 2025-07-23

## 2025-07-23 RX ORDER — MAGNESIUM SULFATE HEPTAHYDRATE 40 MG/ML
2000 INJECTION, SOLUTION INTRAVENOUS PRN
Status: DISCONTINUED | OUTPATIENT
Start: 2025-07-23 | End: 2025-07-26 | Stop reason: HOSPADM

## 2025-07-23 RX ORDER — LACTULOSE 10 G/15ML
30 SOLUTION ORAL 4 TIMES DAILY
Status: DISCONTINUED | OUTPATIENT
Start: 2025-07-23 | End: 2025-07-26 | Stop reason: HOSPADM

## 2025-07-23 RX ORDER — ONDANSETRON 2 MG/ML
4 INJECTION INTRAMUSCULAR; INTRAVENOUS EVERY 6 HOURS PRN
Status: DISCONTINUED | OUTPATIENT
Start: 2025-07-23 | End: 2025-07-26 | Stop reason: HOSPADM

## 2025-07-23 RX ORDER — ONDANSETRON 4 MG/1
4 TABLET, ORALLY DISINTEGRATING ORAL EVERY 8 HOURS PRN
Status: DISCONTINUED | OUTPATIENT
Start: 2025-07-23 | End: 2025-07-26 | Stop reason: HOSPADM

## 2025-07-23 RX ORDER — ACETAMINOPHEN 325 MG/1
650 TABLET ORAL
Status: DISCONTINUED | OUTPATIENT
Start: 2025-07-23 | End: 2025-07-23 | Stop reason: HOSPADM

## 2025-07-23 RX ORDER — SODIUM CHLORIDE 9 MG/ML
INJECTION, SOLUTION INTRAVENOUS
Status: DISCONTINUED | OUTPATIENT
Start: 2025-07-23 | End: 2025-07-23 | Stop reason: SDUPTHER

## 2025-07-23 RX ORDER — PROPOFOL 10 MG/ML
INJECTION, EMULSION INTRAVENOUS
Status: DISCONTINUED | OUTPATIENT
Start: 2025-07-23 | End: 2025-07-23 | Stop reason: SDUPTHER

## 2025-07-23 RX ORDER — DILTIAZEM HYDROCHLORIDE 5 MG/ML
10 INJECTION INTRAVENOUS ONCE
Status: COMPLETED | OUTPATIENT
Start: 2025-07-23 | End: 2025-07-23

## 2025-07-23 RX ORDER — ONDANSETRON 2 MG/ML
4 INJECTION INTRAMUSCULAR; INTRAVENOUS ONCE
Status: COMPLETED | OUTPATIENT
Start: 2025-07-23 | End: 2025-07-23

## 2025-07-23 RX ORDER — METOCLOPRAMIDE HYDROCHLORIDE 5 MG/ML
10 INJECTION INTRAMUSCULAR; INTRAVENOUS
Status: DISCONTINUED | OUTPATIENT
Start: 2025-07-23 | End: 2025-07-23 | Stop reason: HOSPADM

## 2025-07-23 RX ORDER — SODIUM CHLORIDE 9 MG/ML
INJECTION, SOLUTION INTRAVENOUS PRN
Status: DISCONTINUED | OUTPATIENT
Start: 2025-07-23 | End: 2025-07-23 | Stop reason: HOSPADM

## 2025-07-23 RX ADMIN — PANTOPRAZOLE SODIUM 8 MG/HR: 40 INJECTION, POWDER, FOR SOLUTION INTRAVENOUS at 10:25

## 2025-07-23 RX ADMIN — ONDANSETRON 4 MG: 2 INJECTION, SOLUTION INTRAMUSCULAR; INTRAVENOUS at 05:49

## 2025-07-23 RX ADMIN — PROPOFOL 70 MG: 10 INJECTION, EMULSION INTRAVENOUS at 16:26

## 2025-07-23 RX ADMIN — METOROPROLOL TARTRATE 5 MG: 5 INJECTION, SOLUTION INTRAVENOUS at 09:59

## 2025-07-23 RX ADMIN — SODIUM CHLORIDE: 900 INJECTION, SOLUTION INTRAVENOUS at 16:10

## 2025-07-23 RX ADMIN — SODIUM CHLORIDE, PRESERVATIVE FREE 10 ML: 5 INJECTION INTRAVENOUS at 11:49

## 2025-07-23 RX ADMIN — ONDANSETRON 4 MG: 2 INJECTION, SOLUTION INTRAMUSCULAR; INTRAVENOUS at 16:57

## 2025-07-23 RX ADMIN — LIDOCAINE HYDROCHLORIDE 80 MG: 20 INJECTION, SOLUTION EPIDURAL; INFILTRATION; INTRACAUDAL; PERINEURAL at 16:17

## 2025-07-23 RX ADMIN — SUCRALFATE 1 G: 1 TABLET ORAL at 20:07

## 2025-07-23 RX ADMIN — LIDOCAINE HYDROCHLORIDE 4 ML: 40 SOLUTION TOPICAL at 16:15

## 2025-07-23 RX ADMIN — GABAPENTIN 100 MG: 100 CAPSULE ORAL at 20:07

## 2025-07-23 RX ADMIN — OCTREOTIDE ACETATE 25 MCG/HR: 500 INJECTION, SOLUTION INTRAVENOUS; SUBCUTANEOUS at 07:43

## 2025-07-23 RX ADMIN — SODIUM CHLORIDE 1000 ML: 0.9 INJECTION, SOLUTION INTRAVENOUS at 05:50

## 2025-07-23 RX ADMIN — OCTREOTIDE ACETATE 25 MCG/HR: 500 INJECTION, SOLUTION INTRAVENOUS; SUBCUTANEOUS at 20:25

## 2025-07-23 RX ADMIN — DILTIAZEM HYDROCHLORIDE 5 MG/HR: 5 INJECTION, SOLUTION INTRAVENOUS at 11:07

## 2025-07-23 RX ADMIN — MAGNESIUM SULFATE HEPTAHYDRATE 2000 MG: 40 INJECTION, SOLUTION INTRAVENOUS at 10:40

## 2025-07-23 RX ADMIN — OCTREOTIDE ACETATE 25 MCG/HR: 500 INJECTION, SOLUTION INTRAVENOUS; SUBCUTANEOUS at 23:47

## 2025-07-23 RX ADMIN — DILTIAZEM HYDROCHLORIDE 5 MG/HR: 5 INJECTION, SOLUTION INTRAVENOUS at 23:46

## 2025-07-23 RX ADMIN — LIDOCAINE HYDROCHLORIDE 5 ML: 20 SOLUTION ORAL at 20:06

## 2025-07-23 RX ADMIN — SODIUM CHLORIDE 40 MG: 9 INJECTION INTRAMUSCULAR; INTRAVENOUS; SUBCUTANEOUS at 05:51

## 2025-07-23 RX ADMIN — PROPOFOL 70 MG: 10 INJECTION, EMULSION INTRAVENOUS at 16:18

## 2025-07-23 RX ADMIN — ALUMINUM HYDROXIDE, MAGNESIUM HYDROXIDE, AND SIMETHICONE 30 ML: 200; 200; 20 SUSPENSION ORAL at 20:06

## 2025-07-23 RX ADMIN — SODIUM CHLORIDE: 0.9 INJECTION, SOLUTION INTRAVENOUS at 10:01

## 2025-07-23 RX ADMIN — DILTIAZEM HYDROCHLORIDE 10 MG: 5 INJECTION, SOLUTION INTRAVENOUS at 10:15

## 2025-07-23 RX ADMIN — CEFTRIAXONE SODIUM 1000 MG: 2 INJECTION, POWDER, FOR SOLUTION INTRAMUSCULAR; INTRAVENOUS at 12:22

## 2025-07-23 RX ADMIN — ACETAMINOPHEN 650 MG: 325 TABLET ORAL at 23:59

## 2025-07-23 RX ADMIN — SODIUM CHLORIDE 80 MG: 9 INJECTION INTRAMUSCULAR; INTRAVENOUS; SUBCUTANEOUS at 11:11

## 2025-07-23 RX ADMIN — MAGNESIUM SULFATE HEPTAHYDRATE 2000 MG: 40 INJECTION, SOLUTION INTRAVENOUS at 20:23

## 2025-07-23 ASSESSMENT — ENCOUNTER SYMPTOMS
VOMITING: 1
ABDOMINAL PAIN: 1
SHORTNESS OF BREATH: 1
SHORTNESS OF BREATH: 0
NAUSEA: 1
DIARRHEA: 1
COUGH: 0
COLOR CHANGE: 0
PHOTOPHOBIA: 0
TROUBLE SWALLOWING: 0

## 2025-07-23 ASSESSMENT — PAIN SCALES - GENERAL
PAINLEVEL_OUTOF10: 7
PAINLEVEL_OUTOF10: 3

## 2025-07-23 ASSESSMENT — PAIN - FUNCTIONAL ASSESSMENT
PAIN_FUNCTIONAL_ASSESSMENT: NONE - DENIES PAIN
PAIN_FUNCTIONAL_ASSESSMENT: 0-10
PAIN_FUNCTIONAL_ASSESSMENT: NONE - DENIES PAIN

## 2025-07-23 ASSESSMENT — PAIN DESCRIPTION - LOCATION
LOCATION: ABDOMEN
LOCATION: HEAD

## 2025-07-23 ASSESSMENT — PAIN DESCRIPTION - DESCRIPTORS: DESCRIPTORS: ACHING

## 2025-07-23 ASSESSMENT — LIFESTYLE VARIABLES
HOW OFTEN DO YOU HAVE A DRINK CONTAINING ALCOHOL: MONTHLY OR LESS
HOW MANY STANDARD DRINKS CONTAINING ALCOHOL DO YOU HAVE ON A TYPICAL DAY: 1 OR 2

## 2025-07-23 ASSESSMENT — HEART SCORE: ECG: NORMAL

## 2025-07-23 ASSESSMENT — PAIN DESCRIPTION - ORIENTATION: ORIENTATION: ANTERIOR

## 2025-07-23 NOTE — PROGRESS NOTES
Per Dr. Youngblood, pt has cardiac clearance for procedure at this time.   [facility] 28-day rehab ~2 months ago [facility] 28-day rehab ~2 months ago [facility] 28-day rehab ~2 months ago

## 2025-07-23 NOTE — ED NOTES
Report given to JERI Osuna from ED.   Report method in person   The following was reviewed with receiving RN:   Current vital signs:  BP (!) 164/94   Pulse (!) 107   Temp 98.3 °F (36.8 °C) (Oral)   Resp 20   Ht 1.778 m (5' 10\")   Wt 95.3 kg (210 lb)   SpO2 98%   BMI 30.13 kg/m²                      Any medication or safety alerts were reviewed. Any pending diagnostics and notifications were also reviewed, as well as any safety concerns or issues, abnormal labs, abnormal imaging, and abnormal assessment findings. Questions were answered.

## 2025-07-23 NOTE — H&P
[facility]   IN-PATIENT SERVICE   [facility]    HISTORY AND PHYSICAL EXAMINATION            Date:   7/23/2025  Patient name:  Frank Lisa  Date of admission:  7/23/2025  5:22 AM  MRN:   [id number]  Account:  [id number]  YOB: 1960  PCP:    Lopez Rosario PA  Room:   [facility]  Code Status:    Full Code    Chief Complaint:     Chief Complaint   Patient presents with    Hematemesis     Pt c/o dark red emesis x2 starting today        History Obtained From:     patient    History of Present Illness:     The patient is a 64 y.o.  Non- / non  male who presents with Hematemesis (Pt c/o dark red emesis x2 starting today )   and he is admitted to the hospital for the management of      Patient is 64-year-old male past medical history of hepatitis C, history of alcoholic liver cirrhosis, with portal hypertension, s/p, diabetes, decompensated with recurrent ascites and varices, history of esophageal cancer, recent diagnosis of adenocarcinoma, colon, follows up with oncology and neurosurgery as outpatient, presented to the ER with 2 episodes of hematemesis  Patient denies any chest pain shortness of breath  Was also found to be in A-fib with RVR, no chest pain no shortness of breath no abdominal pain, requiring frequent paracentesis last 1 was 2 weeks ago,  Past Medical History:     Past Medical History:   Diagnosis Date    Abdominal pain     Abnormal EKG     Acute deep vein thrombosis (DVT) of brachial vein of right upper extremity (HCC) 01/07/2023    Also- Superficial venous thrombosis of the cephalic vein in the forearm    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)     Alcoholic cirrhosis of liver with ascites (HCC)     AMS (altered mental status)     Anemia 04/13/2022    Anemia     Anxiety     Arthritis     Back pain, chronic     dr. Mc, orthopedic, every 3-4 months, gets steroid injection    Lezama esophagus     Bleeding gastric varices 12/29/2022    Bleeds easily      34.0 pg    MCHC 29.8 (L) 31.0 - 37.0 g/dL    RDW 19.1 (H) 11.5 - 14.9 %    Platelets 104 (L) 150 - 450 k/uL    MPV 9.3 8.0 - 13.5 fL    NRBC Automated 0.0 0 per 100 WBC    Neutrophils % 81 (H) 36 - 66 %    Lymphocytes % 7 (L) 24 - 44 %    Monocytes % 12 (H) 1 - 7 %    Eosinophils % 0 0 - 4 %    Basophils % 0 0 - 2 %    Immature Granulocytes % 0 0 %    Neutrophils Absolute 5.35 1.3 - 9.1 k/uL    Lymphocytes Absolute 0.46 (L) 1.0 - 4.8 k/uL    Monocytes Absolute 0.79 0.1 - 1.3 k/uL    Eosinophils Absolute 0.00 0.0 - 0.4 k/uL    Basophils Absolute 0.00 0.0 - 0.2 k/uL    Immature Granulocytes Absolute 0.00 0.00 - 0.30 k/uL    Morphology ANISOCYTOSIS PRESENT     Morphology HYPOCHROMIA PRESENT     Morphology FEW POLYCHROMASIA    Hepatic Function Panel    Collection Time: 07/23/25  5:45 AM   Result Value Ref Range    Albumin 2.7 (L) 3.5 - 5.2 g/dL    Alkaline Phosphatase 123 40 - 129 U/L    ALT 14 10 - 50 U/L    AST 58 (H) 10 - 50 U/L    Total Bilirubin 3.4 (H) 0.0 - 1.2 mg/dL    Bilirubin, Direct 1.4 (H) 0.0 - 0.3 mg/dL    Bilirubin, Indirect 2.0 (H) 0.0 - 1.0 mg/dL    Total Protein 5.9 (L) 6.6 - 8.7 g/dL   Lipase    Collection Time: 07/23/25  5:45 AM   Result Value Ref Range    Lipase 19 13 - 60 U/L   TYPE AND SCREEN    Collection Time: 07/23/25  5:45 AM   Result Value Ref Range    Blood Bank Sample Expiration 07/26/2025,2359     Arm Band Number [id number]     ABO/Rh AB POSITIVE     Antibody Screen NEGATIVE    EKG 12 Lead    Collection Time: 07/23/25  6:00 AM   Result Value Ref Range    Ventricular Rate 106 BPM    Atrial Rate 106 BPM    P-R Interval 160 ms    QRS Duration 78 ms    Q-T Interval 358 ms    QTc Calculation (Bazett) 475 ms    R Axis 12 degrees    T Axis -15 degrees   Troponin    Collection Time: 07/23/25  6:54 AM   Result Value Ref Range    Troponin, High Sensitivity 14 0 - 22 ng/L       Imaging/Diagnostics:        Assessment :      Primary Problem  GI bleed    Active Hospital Problems    Diagnosis Date Noted

## 2025-07-23 NOTE — ED PROVIDER NOTES
EMERGENCY DEPARTMENT ENCOUNTER    Pt Name: Frank Lisa  MRN: [id number]  Birthdate 1960  Date of evaluation: 7/23/25  CHIEF COMPLAINT       Chief Complaint   Patient presents with    Hematemesis     Pt c/o dark red emesis x2 starting today      HISTORY OF PRESENT ILLNESS   64-year-old male presenting to the ER complaining of hematemesis, abdominal pain as well as diarrhea.  Patient does have an underlying history of esophageal and colon cancers.  Patient states that esophageal cancer is not active but the colon cancer is.  The patient also has a history of varices.    The history is provided by the patient.   Nausea & Vomiting  Severity:  Moderate  Duration:  1 day  Associated symptoms: abdominal pain and diarrhea    Associated symptoms: no arthralgias, no cough, no fever and no myalgias            REVIEW OF SYSTEMS     Review of Systems   Constitutional:  Negative for activity change, fatigue and fever.   HENT:  Negative for congestion, ear pain and trouble swallowing.    Eyes:  Negative for photophobia and visual disturbance.   Respiratory:  Negative for cough and shortness of breath.    Cardiovascular:  Negative for chest pain and palpitations.   Gastrointestinal:  Positive for abdominal pain, diarrhea, nausea and vomiting.   Genitourinary:  Negative for dysuria, flank pain and urgency.   Musculoskeletal:  Negative for arthralgias and myalgias.   Skin:  Negative for color change and rash.   Neurological:  Negative for dizziness and facial asymmetry.   Psychiatric/Behavioral:  Negative for agitation and behavioral problems.      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)     Alcoholic cirrhosis of liver with ascites (HCC)     AMS (altered mental status)  injection 4 mg    sodium chloride 0.9 % bolus 1,000 mL    pantoprazole (PROTONIX) 40 mg in sodium chloride (PF) 0.9 % 10 mL injection    octreotide (SANDOSTATIN) 500 mcg in sodium chloride 0.9 % 100 mL infusion    gabapentin (NEURONTIN) capsule 100 mg    lactulose (CHRONULAC) 10 GM/15ML solution 30 g    nadolol (CORGARD) tablet 20 mg    sucralfate (CARAFATE) tablet 1 g    sodium chloride flush 0.9 % injection 5-40 mL    sodium chloride flush 0.9 % injection 5-40 mL    0.9 % sodium chloride infusion    OR Linked Order Group     potassium chloride (KLOR-CON M) extended release tablet 40 mEq     potassium bicarb-citric acid (EFFER-K) effervescent tablet 40 mEq     potassium chloride 10 mEq/100 mL IVPB (Peripheral Line)    magnesium sulfate 2000 mg in water 50 mL IVPB    OR Linked Order Group     ondansetron (ZOFRAN-ODT) disintegrating tablet 4 mg     ondansetron (ZOFRAN) injection 4 mg    polyethylene glycol (GLYCOLAX) packet 17 g    OR Linked Order Group     acetaminophen (TYLENOL) tablet 650 mg     acetaminophen (TYLENOL) suppository 650 mg    0.9 % sodium chloride infusion    pantoprazole (PROTONIX) 80 mg in sodium chloride (PF) 0.9 % 20 mL injection    pantoprazole (PROTONIX) 80 mg in sodium chloride 0.9 % 100 mL infusion    octreotide (SANDOSTATIN) 500 mcg in sodium chloride 0.9 % 100 mL infusion    metoprolol (LOPRESSOR) injection 5 mg    FOLLOWED BY Linked Order Group     dilTIAZem injection 10 mg     dilTIAZem 125 mg in sodium chloride 0.9 % 125 mL infusion      Titrate Infusion?:   No      Infusion Dose::   5 mg/hr    cefTRIAXone (ROCEPHIN) 1,000 mg in sodium chloride 0.9 % 50 mL IVPB (addEASE)     Antimicrobial Indications:   Other     Other Abx Indication:   sbp prophyalxis    naloxone 0.4 mg in 10 mL sodium chloride syringe    sodium chloride flush 0.9 % injection 5-40 mL    sodium chloride flush 0.9 % injection 5-40 mL    0.9 % sodium chloride infusion    acetaminophen (TYLENOL) tablet 650 mg    meperidine

## 2025-07-23 NOTE — CONSULTS
Date:   7/23/2025  Patient name: Frank Lisa  Date of admission:  7/23/2025  5:22 AM  MRN:   [id number]  YOB: 1960  PCP: Lopez Rosario PA    Reason for Admission: GI bleed [K92.2]  Elevated LFTs [R79.89]  Hematemesis with nausea [K92.0]    Cardiology consult: A-fib with RVR       Referring physician: Dr. Kathi Russo    Impression    Admitted 7/23/2025 with hematemesis, abdominal pain, diarrhea, newly diagnosed esophageal cancer  On admission rhythm was sinus rhythm converted to A-fib with RVR around 9 AM  Severe hypomagnesemia on admission most likely secondary to diarrhea  Previous history of A-fib short lasting couple of years  History of palpitation on exertion  No history of coronary intervention  Normal LV systolic function, ejection fraction 60 to 65%, mild to moderate LVH, 2D echo 7/22/2024  History of hepatitis C  Liver cirrhosis, alcohol abuse, ascites multiple paracentesis, last paracentesis was 2 weeks ago  Severe portal hypertension, status post TIPS(transjugular intrahepatic portosystemic shunt)  Anemia thrombocytopenia  Protein malnutrition  History of bowel perforation requiring right hemicolectomy with end ileostomy reversed on 10/10/2023  History of TIA x 3 presented with a speech problem, drooling, facial muscle weakness    Quit smoking 2016    History of present illness  64-year-old male who lives alone got hospitalized on 7/23/2025 with abdominal pain, diarrhea, hematemesis.  Around midnight he had hematemesis preceded by significant abdominal pain and he was also having diarrhea.  He called EMS.    He has past medical history of his of his cell carcinoma, treated with chemoradiation, liver cirrhosis, hepatitis C, history of heavy alcohol consumption, portal hypertension, status post TIPS procedure, history of colon cancer, ileostomy and then reversal of ileostomy.    On admission  Blood pressure 157/90 heart rate 110 oxygen saturation 100% room air temperature 98.2  Sodium  findings on diagnostic imaging of spine     Foraminal stenosis of cervical region     Spinal stenosis of lumbar region with neurogenic claudication     Low hemoglobin     Anemia     Hypotension     Former smoker, 50+ pack years, quit 2016     HLD (hyperlipidemia)     Acute on chronic anemia     Acute kidney injury     Ascites due to alcoholic cirrhosis (HCC)     Shortness of breath     Drop in hemoglobin     Esophageal polyp     Acute kidney failure, unspecified     Muscle weakness (generalized)     Other abnormalities of gait and mobility     GI bleed     Goals of care, counseling/discussion     ACP (advance care planning)     Palliative care encounter     S/P TIPS (transjugular intrahepatic portosystemic shunt)     Acute metabolic encephalopathy     Lezama's esophagus with dysplasia     Mastoid disorder, bilateral     Acute encephalopathy     Chronic hepatitis C without hepatic coma (HCC)     Pneumonia of right lower lobe due to infectious organism     Swelling of joint of upper arm, right     Anasarca     Lightheadedness     Alcohol withdrawal syndrome, with delirium (HCC)     Hemorrhage of rectum and anus     Electrolyte imbalance     Hyponatremia     Pain of upper abdomen     Moderate malnutrition     Delirium due to another medical condition     ROBYN (acute kidney injury)     Ileostomy in place (HCC)     Peristomal dermatitis associated with moisture     Cellulitis     Coffee ground emesis     Substance abuse (HCC)     History of abdominal surgery     Status post reversal of ileostomy     Neck pain, chronic     Peripheral polyneuropathy     Cubital tunnel syndrome of both upper extremities     Intractable nausea and vomiting     Right lower quadrant abdominal pain     Nausea     Elevated CEA     Abnormal abdominal CT scan     Generalized weakness     High carcinoembryonic antigen (CEA)     Wrist arthritis     Pain in joint involving right ankle and foot     Syncope     Other microscopic hematuria

## 2025-07-23 NOTE — ANESTHESIA POSTPROCEDURE EVALUATION
Department of Anesthesiology  Postprocedure Note    Patient: Frank Lisa  MRN: [id number]  YOB: 1960  Date of evaluation: 7/23/2025    Procedure Summary       Date: 07/23/25 Room / Location: [place] [address] / [facility]    Anesthesia Start: 1613 Anesthesia Stop: 1640    Procedure: ESOPHAGOGASTRODUODENOSCOPY BIOPSY WITH BANDING (Esophagus) Diagnosis:       Gastrointestinal hemorrhage, unspecified gastrointestinal hemorrhage type      (Gastrointestinal hemorrhage, unspecified gastrointestinal hemorrhage type [K92.2])    Surgeons: Dixon Olivia MD Responsible Provider: Hermelindo Holt MD    Anesthesia Type: general, TIVA ASA Status: 3            Anesthesia Type: No value filed.    Yen Phase I: Yen Score: 8    Yen Phase II:      Anesthesia Post Evaluation    Comments: POST- ANESTHESIA EVALUATION       Pt Name: Frank Lisa  MRN: [id number]  YOB: 1960  Date of evaluation: 7/23/2025  Time:  5:08 PM      /86   Pulse 91   Temp 98.1 °F (36.7 °C) (Infrared)   Resp 26   Ht 1.778 m (5' 10\")   Wt 95.3 kg (210 lb)   SpO2 98%   BMI 30.13 kg/m²      Consciousness Level  Awake  Cardiopulmonary Status  Stable  Pain Adequately Treated YES  Nausea / Vomiting  NO  Adequate Hydration  YES  Anesthesia Related Complications NONE      Electronically signed by Hermelindo Holt MD on 7/23/2025 at 5:08 PM           No notable events documented.

## 2025-07-23 NOTE — OP NOTE
EGD report    Esophagogastroduodenoscopy (EGD) Procedure Note    Procedure:  EGD with banding of esophageal varices    Procedure Date: 7/23/2025    Indications: Coffee-ground emesis, anemia, nausea/vomiting    Sedation:  MAC    Attending Physician:  Dr. iDxon Olivia MD    Assistant:  None    Procedure Details:    Informed consent was obtained for the procedure, including sedation. Risks of infection, perforation, hemorrhage, adverse drug reaction, and aspiration were discussed. The patient was placed in the left lateral decubitus position. The patient was monitored continuously with ECG tracing, pulse oximetry, blood pressure monitoring, and direct observation.      The gastroscope was inserted into the mouth and advanced under direct vision to second portion of the duodenum.  A careful inspection was made as the gastroscope was withdrawn, including a retroflexed view of the proximal stomach; findings and interventions are described below. Appropriate photodocumentation was obtained.    Findings:  Retropharyngeal area was grossly normal appearing     Esophagus: Few columns of esophageal varices with red johnathan sign noted.  One of the varix with minor oozing of blood, using a speed banding kit, 3 band successfully placed.  Deflation of varices noted completely.    2 to 3 cm hiatal hernia noted  The mucosa in the distal esophagus appeared somewhat discolored, could be related to prior radiation therapy    Z-line irregular with salmon-colored mucosa extending 3 cm above however biopsies not obtained due to presence of varices.     Stomach:    Fundus: Mild portal hypertensive gastropathy, possible varix on retroflexion view (GOV2 vs IGV1)     Body: normal    Antrum: normal     Duodenum:     Bulb: normal    First part: Normal    Second Part: Normal      Complications:  None           Estimated blood loss:  Minimal    Disposition:  [facility]           Condition: Stable    Specimen Removed: None    Impression:    Few  columns of esophageal varices with red johnathan sign and 1 with minor oozing of blood, 3 band successfully placed.  Hemostasis achieved and varices noted to be deflated.  2 to 3 cm hiatal hernia  Abnormal salmon-colored mucosa extending 3 cm above, biopsies not performed due to varices  Discolored mucosa in distal esophagus, radiation related?  Mild portal hypertensive gastropathy  Possible gastric varix on retroflexion view in fundus (GOV2 vs IGV1)     Recommendations:  Continue nadolol 20 mg twice daily, titrate to heart rate 55-60  Continue Protonix and octreotide drip  Clear liquid diet later today if no further bleeding identified  Repeat EGD in 4 to 6 weeks to reassess varices    Attending Attestation: I performed the procedure    Dixon Olivia MD

## 2025-07-23 NOTE — CONSULTS
Gastroenterology Consult Note    Patient:   Frank Lisa   Admit date:  7/23/2025  Facility:   [facility]  Referring/PCP: Lopez Rosario PA  Date:     7/23/2025  Consultant:   MASSIMO Smith NP, Dixon Olivia MD    Subjective:     This 64 y.o. male was admitted 7/23/2025 with a diagnosis of \"GI bleed [K92.2]  Elevated LFTs [R79.89]  Hematemesis with nausea [K92.0]\" and is seen in consultation regarding   Chief Complaint   Patient presents with    Hematemesis     Pt c/o dark red emesis x2 starting today      64/M w/ pmhx of cirrhosis secondary to EtOH abuse complicated by EV, recurrent ascites status post TIPS, TALA, hepatitis C treated with Harvoni, GERD, esophageal cancer treated with chemoradiation, history of bowel perforation requiring right hemicolectomy with end ileostomy reversed on 10/10/2023, colonoscopy 05/2025 revealed circumferential mass in colon positive for invasive adenocarcinoma presents to ED for hematemesis.  Patient reports 2 small episodes of coffee-ground emesis prior to arrival.  Denies any chest pain, shortness of breath, fevers, chills.  He was found to be in A.fib RVR this am.  Cardiology consulted.  Hgb on admission 9.3, plt 104.  PT 17.6, INR 1.3.  , ALT 14, AST 58, Bili 3.4  Mag 1.2.    CT abd/pelvis:  1. Known history of esophageal carcinoma with diffuse mucosal thickening of  the visualized distal esophagus.  2. Cirrhosis with large volume ascites and mesenteric edema.  3. Cholelithiasis.  4. Stable splenic artery aneurysm.  5. Stable right pleural effusion and pericardial effusion.  6. Stable calcified nodule near the anastomosis in the right mid abdomen.  Probable postsurgical change with either fat necrosis or calcified granuloma.  Carcinomatosis is considered less likely given stability.  Attention on  follow-up is advised.    Last EGD 05/2025:   Multiple columns of large esophageal varices, red johnathan sign, 4 band successfully placed

## 2025-07-23 NOTE — ED TRIAGE NOTES
Mode of arrival (squad #, walk in, police, etc) : EMS        Chief complaint(s): Blood in emesis        Arrival Note (brief scenario, treatment PTA, etc).: Pt states they had two episodes of blood emesis, stating the color was a dark red the patient states they have a hx of esophogeal and colon cancer as well as GI bleeds. Pt denies blood thinners but does admit to iron supplements. Pt a/o x4 at triage         C= \"Have you ever felt that you should Cut down on your drinking?\"  No  A= \"Have people Annoyed you by criticizing your drinking?\"  No  G= \"Have you ever felt bad or Guilty about your drinking?\"  No  E= \"Have you ever had a drink as an Eye-opener first thing in the morning to steady your nerves or to help a hangover?\"  No      Deferred []      Reason for deferring: N/A    *If yes to two or more: probable alcohol abuse.*

## 2025-07-23 NOTE — ANESTHESIA PRE PROCEDURE
BMI:   Wt Readings from Last 3 Encounters:   07/23/25 95.3 kg (210 lb)   06/27/25 103 kg (227 lb)   06/06/25 90.7 kg (200 lb)     Body mass index is 30.13 kg/m².    CBC:   Lab Results   Component Value Date/Time    WBC 6.6 07/23/2025 05:45 AM    RBC 3.72 07/23/2025 05:45 AM    RBC 4.75 04/19/2012 11:30 AM    HGB 9.3 07/23/2025 05:45 AM    HCT 31.2 07/23/2025 05:45 AM    MCV 83.9 07/23/2025 05:45 AM    RDW 19.1 07/23/2025 05:45 AM     07/23/2025 05:45 AM     04/19/2012 11:30 AM       CMP:   Lab Results   Component Value Date/Time     07/23/2025 05:45 AM    K 3.7 07/23/2025 05:45 AM     07/23/2025 05:45 AM    CO2 21 07/23/2025 05:45 AM    BUN 7 07/23/2025 05:45 AM    CREATININE 1.1 07/23/2025 05:45 AM    GFRAA >60 09/28/2022 01:33 PM    LABGLOM 74 07/23/2025 05:45 AM    LABGLOM >90 04/28/2024 03:13 PM    GLUCOSE 102 07/23/2025 05:45 AM    GLUCOSE 71 04/21/2023 04:18 AM    CALCIUM 8.5 07/23/2025 05:45 AM    BILITOT 3.4 07/23/2025 05:45 AM    ALKPHOS 123 07/23/2025 05:45 AM    AST 58 07/23/2025 05:45 AM    ALT 14 07/23/2025 05:45 AM       POC Tests: No results for input(s): \"POCGLU\", \"POCNA\", \"POCK\", \"POCCL\", \"POCBUN\", \"POCHEMO\", \"POCHCT\" in the last 72 hours.    Coags:   Lab Results   Component Value Date/Time    PROTIME 17.6 07/23/2025 05:45 AM    INR 1.3 07/23/2025 05:45 AM    APTT 34.7 07/23/2025 05:45 AM       HCG (If Applicable): No results found for: \"PREGTESTUR\", \"PREGSERUM\", \"HCG\", \"HCGQUANT\"     ABGs:   Lab Results   Component Value Date/Time    PHART 7.310 03/25/2023 11:30 PM    PO2ART 336.0 03/25/2023 11:30 PM    TER6FQW 37.4 03/25/2023 11:30 PM    EVT9IGM 18.3 03/25/2023 11:30 PM    F0ZILRQI 100.0 03/25/2023 11:30 PM        Type & Screen (If Applicable):  Lab Results   Component Value Date    ABORH AB POSITIVE 07/23/2025    LABANTI NEGATIVE 07/23/2025       Drug/Infectious Status (If Applicable):  Lab Results   Component Value Date/Time

## 2025-07-23 NOTE — PROGRESS NOTES
Physical Therapy          Physical Therapy Cancel Note      DATE: 2025    NAME: Frank Lisa  MRN: [id number]   : 1960      Patient not seen this date for Physical Therapy due to:    Surgery/Procedure: Off the floor for ESOPHAGOGASTRODUODENOSCOPY BIOPSY at this time. Will continue to follow.       Electronically signed by Carrol Peraza PT on 2025 at 3:55 PM

## 2025-07-24 ENCOUNTER — APPOINTMENT (OUTPATIENT)
Dept: INTERVENTIONAL RADIOLOGY/VASCULAR | Age: 65
DRG: 432 | End: 2025-07-24
Payer: COMMERCIAL

## 2025-07-24 LAB
ANION GAP SERPL CALCULATED.3IONS-SCNC: 13 MMOL/L (ref 9–16)
APPEARANCE FLD: ABNORMAL
BACTERIA URNS QL MICRO: ABNORMAL
BASOPHILS # BLD: 0 K/UL (ref 0–0.2)
BASOPHILS NFR BLD: 0 % (ref 0–2)
BILIRUB UR QL STRIP: NEGATIVE
BLASTS NFR FLD: ABNORMAL %
BODY FLD TYPE: ABNORMAL
BUN SERPL-MCNC: 9 MG/DL (ref 8–23)
CALCIUM SERPL-MCNC: 7.7 MG/DL (ref 8.6–10.4)
CASTS #/AREA URNS LPF: ABNORMAL /LPF
CHLORIDE SERPL-SCNC: 105 MMOL/L (ref 98–107)
CLARITY UR: CLEAR
CO2 SERPL-SCNC: 19 MMOL/L (ref 20–31)
COLOR FLD: YELLOW
COLOR UR: YELLOW
CREAT SERPL-MCNC: 1.2 MG/DL (ref 0.7–1.2)
EKG Q-T INTERVAL: 310 MS
EKG QRS DURATION: 76 MS
EKG QTC CALCULATION (BAZETT): 483 MS
EKG R AXIS: 96 DEGREES
EKG T AXIS: -23 DEGREES
EKG VENTRICULAR RATE: 146 BPM
EOSINOPHIL # BLD: 0.29 K/UL (ref 0–0.4)
EOSINOPHIL NFR FLD: ABNORMAL %
EOSINOPHILS RELATIVE PERCENT: 4 % (ref 0–4)
EPI CELLS #/AREA URNS HPF: ABNORMAL /HPF
ERYTHROCYTE [DISTWIDTH] IN BLOOD BY AUTOMATED COUNT: 19.2 % (ref 11.5–14.9)
EST. AVERAGE GLUCOSE BLD GHB EST-MCNC: 77 MG/DL
GFR, ESTIMATED: 67 ML/MIN/1.73M2
GLUCOSE BLD-MCNC: 112 MG/DL (ref 75–110)
GLUCOSE BLD-MCNC: 139 MG/DL (ref 75–110)
GLUCOSE SERPL-MCNC: 206 MG/DL (ref 74–99)
GLUCOSE UR STRIP-MCNC: NEGATIVE MG/DL
HBA1C MFR BLD: 4.3 % (ref 4–6)
HCT VFR BLD AUTO: 26.9 % (ref 41–53)
HCT VFR BLD AUTO: 27.2 % (ref 41–53)
HCT VFR BLD AUTO: 27.6 % (ref 41–53)
HGB BLD-MCNC: 7.8 G/DL (ref 13.5–17.5)
HGB BLD-MCNC: 7.8 G/DL (ref 13.5–17.5)
HGB BLD-MCNC: 8 G/DL (ref 13.5–17.5)
HGB UR QL STRIP.AUTO: ABNORMAL
IMM GRANULOCYTES # BLD AUTO: 0 K/UL (ref 0–0.3)
IMM GRANULOCYTES NFR BLD: 0 %
KETONES UR STRIP-MCNC: NEGATIVE MG/DL
LDH FLD L TO P-CCNC: 99 U/L
LEUKOCYTE ESTERASE UR QL STRIP: NEGATIVE
LYMPHOCYTES NFR BLD: 0.72 K/UL (ref 1–4.8)
LYMPHOCYTES NFR FLD: 73 %
LYMPHOCYTES RELATIVE PERCENT: 10 % (ref 24–44)
MAGNESIUM SERPL-MCNC: 1.9 MG/DL (ref 1.6–2.4)
MCH RBC QN AUTO: 25.1 PG (ref 26–34)
MCHC RBC AUTO-ENTMCNC: 28.7 G/DL (ref 31–37)
MCV RBC AUTO: 87.5 FL (ref 80–100)
MESOTHELIAL CELLS BODY FLUID: ABNORMAL %
MONOCYTES NFR BLD: 0.5 K/UL (ref 0.1–1.3)
MONOCYTES NFR BLD: 7 % (ref 1–7)
MONOCYTES NFR FLD: 20 %
MORPHOLOGY: ABNORMAL
MORPHOLOGY: ABNORMAL
NEUTROPHILS NFR BLD: 79 % (ref 36–66)
NEUTROPHILS NFR FLD: 6 %
NEUTS SEG NFR BLD: 5.69 K/UL (ref 1.3–9.1)
NITRITE UR QL STRIP: NEGATIVE
NRBC BLD-RTO: 0 PER 100 WBC
NUC CELL # FLD: 59 CELLS/UL
PH UR STRIP: 6 [PH] (ref 5–8)
PLATELET # BLD AUTO: 95 K/UL (ref 150–450)
PMV BLD AUTO: 10.7 FL (ref 8–13.5)
POTASSIUM SERPL-SCNC: 3.6 MMOL/L (ref 3.7–5.3)
PROT FLD-MCNC: 1.5 G/DL
PROT UR STRIP-MCNC: NEGATIVE MG/DL
RBC # BLD AUTO: 3.11 M/UL (ref 4.21–5.77)
RBC # FLD: <3000 CELLS/UL
RBC #/AREA URNS HPF: ABNORMAL /HPF
SODIUM SERPL-SCNC: 137 MMOL/L (ref 136–145)
SP GR UR STRIP: 1.01 (ref 1–1.03)
SPECIMEN TYPE: NORMAL
SPECIMEN TYPE: NORMAL
UNIDENT CELLS NFR FLD: 1 %
UROBILINOGEN UR STRIP-ACNC: NORMAL EU/DL (ref 0–1)
WBC #/AREA URNS HPF: ABNORMAL /HPF
WBC OTHER # BLD: 7.2 K/UL (ref 3.5–11)

## 2025-07-24 PROCEDURE — 87075 CULTR BACTERIA EXCEPT BLOOD: CPT

## 2025-07-24 PROCEDURE — 49083 ABD PARACENTESIS W/IMAGING: CPT

## 2025-07-24 PROCEDURE — 89051 BODY FLUID CELL COUNT: CPT

## 2025-07-24 PROCEDURE — 36415 COLL VENOUS BLD VENIPUNCTURE: CPT

## 2025-07-24 PROCEDURE — 99233 SBSQ HOSP IP/OBS HIGH 50: CPT | Performed by: INTERNAL MEDICINE

## 2025-07-24 PROCEDURE — 6370000000 HC RX 637 (ALT 250 FOR IP): Performed by: INTERNAL MEDICINE

## 2025-07-24 PROCEDURE — 2580000003 HC RX 258: Performed by: INTERNAL MEDICINE

## 2025-07-24 PROCEDURE — 6360000002 HC RX W HCPCS: Performed by: INTERNAL MEDICINE

## 2025-07-24 PROCEDURE — 80048 BASIC METABOLIC PNL TOTAL CA: CPT

## 2025-07-24 PROCEDURE — 87205 SMEAR GRAM STAIN: CPT

## 2025-07-24 PROCEDURE — 83615 LACTATE (LD) (LDH) ENZYME: CPT

## 2025-07-24 PROCEDURE — 82947 ASSAY GLUCOSE BLOOD QUANT: CPT

## 2025-07-24 PROCEDURE — 85025 COMPLETE CBC W/AUTO DIFF WBC: CPT

## 2025-07-24 PROCEDURE — 84157 ASSAY OF PROTEIN OTHER: CPT

## 2025-07-24 PROCEDURE — APPSS30 APP SPLIT SHARED TIME 16-30 MINUTES: Performed by: NURSE PRACTITIONER

## 2025-07-24 PROCEDURE — 81001 URINALYSIS AUTO W/SCOPE: CPT

## 2025-07-24 PROCEDURE — 85014 HEMATOCRIT: CPT

## 2025-07-24 PROCEDURE — 85018 HEMOGLOBIN: CPT

## 2025-07-24 PROCEDURE — 0W9G3ZZ DRAINAGE OF PERITONEAL CAVITY, PERCUTANEOUS APPROACH: ICD-10-PCS | Performed by: INTERNAL MEDICINE

## 2025-07-24 PROCEDURE — 6370000000 HC RX 637 (ALT 250 FOR IP): Performed by: NURSE PRACTITIONER

## 2025-07-24 PROCEDURE — 83735 ASSAY OF MAGNESIUM: CPT

## 2025-07-24 PROCEDURE — 2709999900 IR US GUIDED PARACENTESIS

## 2025-07-24 PROCEDURE — 93010 ELECTROCARDIOGRAM REPORT: CPT | Performed by: INTERNAL MEDICINE

## 2025-07-24 PROCEDURE — 2060000000 HC ICU INTERMEDIATE R&B

## 2025-07-24 PROCEDURE — 83036 HEMOGLOBIN GLYCOSYLATED A1C: CPT

## 2025-07-24 PROCEDURE — 88112 CYTOPATH CELL ENHANCE TECH: CPT

## 2025-07-24 PROCEDURE — 87070 CULTURE OTHR SPECIMN AEROBIC: CPT

## 2025-07-24 PROCEDURE — 2500000003 HC RX 250 WO HCPCS: Performed by: INTERNAL MEDICINE

## 2025-07-24 PROCEDURE — 88305 TISSUE EXAM BY PATHOLOGIST: CPT

## 2025-07-24 RX ORDER — INSULIN LISPRO 100 [IU]/ML
0-8 INJECTION, SOLUTION INTRAVENOUS; SUBCUTANEOUS
Status: DISCONTINUED | OUTPATIENT
Start: 2025-07-24 | End: 2025-07-26 | Stop reason: HOSPADM

## 2025-07-24 RX ORDER — DEXTROSE MONOHYDRATE 100 MG/ML
INJECTION, SOLUTION INTRAVENOUS CONTINUOUS PRN
Status: DISCONTINUED | OUTPATIENT
Start: 2025-07-24 | End: 2025-07-26 | Stop reason: HOSPADM

## 2025-07-24 RX ORDER — DILTIAZEM HYDROCHLORIDE 120 MG/1
120 CAPSULE, COATED, EXTENDED RELEASE ORAL DAILY
Status: DISCONTINUED | OUTPATIENT
Start: 2025-07-24 | End: 2025-07-26 | Stop reason: HOSPADM

## 2025-07-24 RX ORDER — DILTIAZEM HYDROCHLORIDE 120 MG/1
120 CAPSULE, COATED, EXTENDED RELEASE ORAL DAILY
Status: DISCONTINUED | OUTPATIENT
Start: 2025-07-24 | End: 2025-07-24

## 2025-07-24 RX ADMIN — PANTOPRAZOLE SODIUM 8 MG/HR: 40 INJECTION, POWDER, FOR SOLUTION INTRAVENOUS at 23:16

## 2025-07-24 RX ADMIN — OCTREOTIDE ACETATE 25 MCG/HR: 500 INJECTION, SOLUTION INTRAVENOUS; SUBCUTANEOUS at 20:10

## 2025-07-24 RX ADMIN — LACTULOSE 30 G: 20 SOLUTION ORAL at 12:09

## 2025-07-24 RX ADMIN — NADOLOL 20 MG: 20 TABLET ORAL at 08:14

## 2025-07-24 RX ADMIN — SUCRALFATE 1 G: 1 TABLET ORAL at 17:19

## 2025-07-24 RX ADMIN — NADOLOL 20 MG: 20 TABLET ORAL at 19:52

## 2025-07-24 RX ADMIN — DILTIAZEM HYDROCHLORIDE 120 MG: 120 CAPSULE, COATED, EXTENDED RELEASE ORAL at 17:19

## 2025-07-24 RX ADMIN — SODIUM CHLORIDE, PRESERVATIVE FREE 10 ML: 5 INJECTION INTRAVENOUS at 19:49

## 2025-07-24 RX ADMIN — Medication 3 MG: at 19:49

## 2025-07-24 RX ADMIN — PANTOPRAZOLE SODIUM 8 MG/HR: 40 INJECTION, POWDER, FOR SOLUTION INTRAVENOUS at 12:11

## 2025-07-24 RX ADMIN — GABAPENTIN 100 MG: 100 CAPSULE ORAL at 08:14

## 2025-07-24 RX ADMIN — SUCRALFATE 1 G: 1 TABLET ORAL at 05:59

## 2025-07-24 RX ADMIN — CEFTRIAXONE SODIUM 1000 MG: 2 INJECTION, POWDER, FOR SOLUTION INTRAMUSCULAR; INTRAVENOUS at 10:26

## 2025-07-24 RX ADMIN — PANTOPRAZOLE SODIUM 8 MG/HR: 40 INJECTION, POWDER, FOR SOLUTION INTRAVENOUS at 01:01

## 2025-07-24 RX ADMIN — GABAPENTIN 100 MG: 100 CAPSULE ORAL at 19:49

## 2025-07-24 RX ADMIN — SUCRALFATE 1 G: 1 TABLET ORAL at 19:49

## 2025-07-24 RX ADMIN — SUCRALFATE 1 G: 1 TABLET ORAL at 12:10

## 2025-07-24 NOTE — PROGRESS NOTES
Refractory ascites     Hypomagnesemia     Chronic viral hepatitis B without delta agent and without coma (HCC)     Calculus of gallbladder without cholecystitis     Hep C w/o coma, chronic (HCC)     Fatty liver     Psychophysiologic insomnia     Cirrhosis (HCC)     Decompensation of cirrhosis of liver (HCC)     Vertebrogenic low back pain     DDD (degenerative disc disease), lumbar     Depression     Tubular adenoma of colon     History of colon polyps     Gynecomastia, male     Lumbar disc herniation     Tinnitus     Eustachian tube dysfunction     Ganglion cyst     Carpal tunnel syndrome of right wrist     History of hepatitis C     Vitamin D deficiency     Pure hypercholesterolemia     Hypokalemia     Essential hypertension     Recurrent major depressive disorder in partial remission     S/P epidural steroid injection     Elevated LFTs     Seasonal allergies     Lumbar facet arthropathy     Cervical facet syndrome     Thrombocytopenia     Hepatitis C virus infection resolved after antiviral drug therapy     Alcohol abuse     Altered mental status     Hypocalcemia     Hypophosphatemia     Malignant neoplasm of lower third of esophagus (HCC)     COVID-19     Anxiety     Current smoker     Severe comorbid illness     Multifactorial gait disorder     Abnormal findings on diagnostic imaging of spine     Foraminal stenosis of cervical region     Spinal stenosis of lumbar region with neurogenic claudication     Low hemoglobin     Anemia     Hypotension     Former smoker, 50+ pack years, quit 2016     HLD (hyperlipidemia)     Acute on chronic anemia     Acute kidney injury     Ascites due to alcoholic cirrhosis (HCC)     Shortness of breath     Drop in hemoglobin     Esophageal polyp     Acute kidney failure, unspecified     Muscle weakness (generalized)     Other abnormalities of gait and mobility     GI bleed     Goals of care, counseling/discussion     ACP (advance care planning)     Palliative care encounter     S/P

## 2025-07-24 NOTE — PROGRESS NOTES
Physical Therapy          Physical Therapy Cancel Note      DATE: 2025    NAME: Frank Lisa  MRN: [id number]   : 1960      Patient not seen this date for Physical Therapy due to:    1st attempt 10:55A:M-pt request to come back after half hour.   2nd attempt 11:35 a:m- Lunch tray arrives, therapist told pt, will attempt later in the afternoon.   3rd attempt:  pt declined to participate in eval process this afternoon due to pt stated he was too tired and just had paracentesis and wanted to rest. Pt asked if therapy could attempt to see him tomorrow.   Will continue to follow tomorrow.     Electronically signed by Carrol Peraza PT on 2025 at 4:17 PM

## 2025-07-24 NOTE — CARE COORDINATION
Case Management Assessment  Initial Evaluation    Date/Time of Evaluation: 7/24/2025 10:30AM  Assessment Completed by: Leonila Parker RN    If patient is discharged prior to next notation, then this note serves as note for discharge by case management.    Patient Name: Frank Lisa                   YOB: 1960  Diagnosis: GI bleed [K92.2]  Elevated LFTs [R79.89]  Hematemesis with nausea [K92.0]                   Date / Time: 7/23/2025  5:22 AM    Patient Admission Status: Inpatient   Readmission Risk (Low < 19, Mod (19-27), High > 27): Readmission Risk Score: 25.9    Current PCP: Lopez Rosario PA  PCP verified by CM? Yes    Chart Reviewed: Yes      History Provided by: Patient, Medical Record  Patient Orientation: Alert and Oriented    Patient Cognition: Alert    Hospitalization in the last 30 days (Readmission):  No    If yes, Readmission Assessment in CM Navigator will be completed.    Advance Directives:      Code Status: Full Code   Patient's Primary Decision Maker is: Named in Scanned ACP Document    Primary Decision Maker: Nilam Lisa - Ex-Spouse - [phone]    Discharge Planning:    Patient lives with: Alone Type of Home: House  Primary Care Giver: Self  Patient Support Systems include: Family Members, Other (Comment) (ex-spouse)   Current Financial resources: Medicare, Medicaid  Current community resources: ECF/Home Care  Current services prior to admission: Durable Medical Equipment            Current DME: Walker, Cane, Shower Chair            Type of Home Care services:  PT, Nursing Services, OT    ADLS  Prior functional level: Assistance with the following:, Mobility  Current functional level: Assistance with the following:, Mobility    PT AM-PAC:   /24  OT AM-PAC:   /24    Family can provide assistance at DC: Yes  Would you like Case Management to discuss the discharge plan with any other family members/significant others, and if so, who? No  Plans to Return to Present

## 2025-07-24 NOTE — PROGRESS NOTES
[facility]   IN-PATIENT SERVICE   [facility]  Progress note            Date:   7/24/2025  Patient name:  Frank Lisa  Date of admission:  7/23/2025  5:22 AM  MRN:   [id number]  Account:  [id number]  YOB: 1960  PCP:    Lopez Rosario PA  Room:   2081/2081-01  Code Status:    Full Code    Chief Complaint:     Chief Complaint   Patient presents with    Hematemesis     Pt c/o dark red emesis x2 starting today        History Obtained From:     patient    History of Present Illness:     The patient is a 64 y.o.  Non- / non  male who presents with Hematemesis (Pt c/o dark red emesis x2 starting today )   and he is admitted to the hospital for the management of      Patient is 64-year-old male past medical history of hepatitis C, history of alcoholic liver cirrhosis, with portal hypertension, s/p, diabetes, decompensated with recurrent ascites and varices, history of esophageal cancer, recent diagnosis of adenocarcinoma, colon, follows up with oncology and neurosurgery as outpatient, presented to the ER with 2 episodes of hematemesis  Patient denies any chest pain shortness of breath  Was also found to be in A-fib with RVR, no chest pain no shortness of breath no abdominal pain, requiring frequent paracentesis last 1 was 2 weeks ago,      7/24  Patient seen and examined,  No more episodes of hematemesis or melanotic stools, had EGD yesterday  Patient wants to get paracentesis done ASAP, otherwise wanted to get transferred to [facility] if cannot get done today, explained the rationally since it is not emergent, we will reach out to IR if not today she will have it by tomorrow    Past Medical History:     Past Medical History:   Diagnosis Date    Abdominal pain     Abnormal EKG     Acute deep vein thrombosis (DVT) of brachial vein of right upper extremity (HCC) 01/07/2023    Also- Superficial venous thrombosis of the cephalic vein in the forearm    Adenocarcinoma  (LIPITOR) 40 MG tablet Take 1 tablet by mouth nightly 5/23/25  Yes Brianda Soni MD   lactulose (CHRONULAC) 10 GM/15ML solution Take 45 mLs by mouth in the morning, at noon, in the evening, and at bedtime 6/5/25 9/3/25  Dixon Olivia MD   polyethylene glycol (GOLYTELY) 236 g solution Take as directed by office for colonoscopy  Patient not taking: Reported on 7/23/2025 6/2/25   Jose A Coleman MD   nadolol (CORGARD) 20 MG tablet Take 1 tablet by mouth 2 times daily 5/22/25   Jaziel Cerda MD   folic acid (FOLVITE) 1 MG tablet TAKE 1 TABLET BY MOUTH EVERY DAY 4/10/25   Brianda Soni MD   gabapentin (NEURONTIN) 100 MG capsule Take 1 capsule by mouth 2 times daily for 180 days. Intended supply: 90 days 3/26/25 9/22/25  Dixon Olivia MD   pantoprazole (PROTONIX) 40 MG tablet Take 1 tablet by mouth in the morning and at bedtime 11/26/24   Armen Adkins MD   magnesium oxide (MAG-OX) 400 MG tablet Take 1 tablet by mouth daily 8/6/24   Lopez Rosario PA   sucralfate (CARAFATE) 1 GM tablet Take 1 tablet by mouth 4 times daily (before meals and nightly) 6/26/24   Jessika Abad MD   Elastic Bandages & Supports (MEDICAL COMPRESSION STOCKINGS) MISC 2 each by Does not apply route daily Right and left knee high compression stockings.  30-40 mmHg.  To be worn continuously throughout the day. 6/13/24   Lopez Rosario PA   ferrous sulfate (IRON 325) 325 (65 Fe) MG tablet Take 1 tablet by mouth in the morning and at bedtime  Patient not taking: Reported on 7/23/2025    Dania Peoples MD   Handicap Placard MISC by Does not apply route Exp: 5-14-26 5/14/24   Lopez Rosario PA   DULoxetine (CYMBALTA) 30 MG extended release capsule Take 1 capsule by mouth daily  Patient not taking: Reported on 6/27/2025 4/19/24   Hamida Loaiza MD        Allergies:     Patient has no known allergies.    Social History:     Tobacco:    reports that he quit smoking about 8 years ago. His smoking use included

## 2025-07-24 NOTE — PROGRESS NOTES
[facility]   OCCUPATIONAL THERAPY MISSED TREATMENT NOTE   INPATIENT   Date: 25  Patient Name: Frank Lisa       Room:   MRN: [id number]   Account #: [id number]    : 1960  (64 y.o.)  Gender: male                 REASON FOR MISSED TREATMENT:  Pt not seen for the OT evaluation this afternoon due to    -   pt declined to participate in eval process this afternoon due to pt stated he was too tired and just had paracentesis and wanted to rest. Pt asked if therapy could attempt to see him tomorrow.  OT will reattempt to see pt tomorrow.     [id number] Attempt        Electronically signed by STACEY Donovan  on 25 at 4:00 PM EDT

## 2025-07-24 NOTE — PLAN OF CARE
Problem: Discharge Planning  Goal: Discharge to home or other facility with appropriate resources  7/24/2025 1653 by Karrie Philip RN  Outcome: Progressing     Problem: Safety - Adult  Goal: Free from fall injury  7/24/2025 1653 by Karrie Philip RN  Outcome: Progressing     Problem: Pain  Goal: Verbalizes/displays adequate comfort level or baseline comfort level  7/24/2025 1653 by Karrie Philip RN  Outcome: Progressing     Problem: Gastrointestinal - Adult  Goal: Minimal or absence of nausea and vomiting  7/24/2025 1653 by Karrie Philip RN  Outcome: Progressing     Problem: Gastrointestinal - Adult  Goal: Maintains or returns to baseline bowel function  7/24/2025 1653 by Karrie Philip RN  Outcome: Progressing     Problem: Gastrointestinal - Adult  Goal: Maintains adequate nutritional intake  7/24/2025 1653 by Karrie Philip RN  Outcome: Progressing

## 2025-07-24 NOTE — PROGRESS NOTES
GI Progress notes    7/24/2025   8:56 AM    Name:  Frank Lias  MRN:    [id number]     Acct:     [id number]   Room:  2081/2081-01   Day: 1     Admit Date: 7/23/2025  5:22 AM  PCP: Lopez Rosario PA    Subjective:     C/C:   Chief Complaint   Patient presents with    Hematemesis     Pt c/o dark red emesis x2 starting today        Interval History:    Patient seen and examined.  No acute events overnight.  Alert, oriented.   No further episodes of hematemesis.  S/p EGD   Few columns of esophageal varices with red johnathan sign and 1 with minor oozing of blood, 3 band successfully placed.  Hemostasis achieved and varices noted to be deflated.  2 to 3 cm hiatal hernia  Abnormal salmon-colored mucosa extending 3 cm above, biopsies not performed due to varices  Discolored mucosa in distal esophagus, radiation related?  Mild portal hypertensive gastropathy  Possible gastric varix on retroflexion view in fundus (GOV2 vs IGV1)      ROS:  Constitutional: negative for chills, fevers and sweats    Gastrointestinal: negative for abdominal pain, constipation, diarrhea, nausea and vomiting  Neurological: negative for dizziness and headaches    Medications:     Allergies: No Known Allergies    Current Meds: melatonin tablet 3 mg, Nightly PRN  insulin lispro (HUMALOG,ADMELOG) injection vial 0-8 Units, 4x Daily AC & HS  glucose chewable tablet 16 g, PRN  dextrose bolus 10% 125 mL, PRN   Or  dextrose bolus 10% 250 mL, PRN  glucagon injection 1 mg, PRN  dextrose 10 % infusion, Continuous PRN  octreotide (SANDOSTATIN) 500 mcg in sodium chloride 0.9 % 100 mL infusion, Continuous  gabapentin (NEURONTIN) capsule 100 mg, BID  lactulose (CHRONULAC) 10 GM/15ML solution 30 g, 4x daily  nadolol (CORGARD) tablet 20 mg, BID  sucralfate (CARAFATE) tablet 1 g, 4x Daily AC & HS  sodium chloride flush 0.9 % injection 5-40 mL, 2 times per day  sodium chloride flush 0.9 % injection 5-40 mL, PRN  0.9 % sodium chloride infusion, PRN  potassium

## 2025-07-24 NOTE — ACP (ADVANCE CARE PLANNING)
Advance Care Planning     Advance Care Planning Activator (Inpatient)  Conversation Note      Date of ACP Conversation: 7/24/2025     Conversation Conducted with: Patient with Decision Making Capacity    ACP Activator: Leonila Parker RN    Health Care Decision Maker:     Current Designated Health Care Decision Maker:     Primary Decision Maker: Nilam Lisa - Ex-Spouse - [phone]  Click here to complete Healthcare Decision Makers including section of the Healthcare Decision Maker Relationship (ie \"Primary\")  Today we documented Decision Maker(s) consistent with ACP documents on file.    Care Preferences    Ventilation:  \"If you were in your present state of health and suddenly became very ill and were unable to breathe on your own, what would your preference be about the use of a ventilator (breathing machine) if it were available to you?\"      Would the patient desire the use of ventilator (breathing machine)?: yes    \"If your health worsens and it becomes clear that your chance of recovery is unlikely, what would your preference be about the use of a ventilator (breathing machine) if it were available to you?\"     Would the patient desire the use of ventilator (breathing machine)?: No      Resuscitation  \"CPR works best to restart the heart when there is a sudden event, like a heart attack, in someone who is otherwise healthy. Unfortunately, CPR does not typically restart the heart for people who have serious health conditions or who are very sick.\"    \"In the event your heart stopped as a result of an underlying serious health condition, would you want attempts to be made to restart your heart (answer \"yes\" for attempt to resuscitate) or would you prefer a natural death (answer \"no\" for do not attempt to resuscitate)?\" yes       [] Yes   [] No   Educated Patient / Decision Maker regarding differences between Advance Directives and portable DNR orders.    Length of ACP Conversation in minutes:

## 2025-07-24 NOTE — OR NURSING
Patient brought to the IR suite via cart, after consent obtained, patient placed on the procedure table, patient came with monitoring equipment hooked up.  Patient then positioned/prepped/draped

## 2025-07-24 NOTE — PROGRESS NOTES
Comprehensive Nutrition Assessment    Type and Reason for Visit:  Positive nutrition screen (wt loss, poor appetite)    Nutrition Recommendations/Plan:   Recommend 2g Na,  Low Fiber diet with 5 carbohydrate choices per tray when advanced to solid food     Malnutrition Assessment:  Malnutrition Status:  At risk for malnutrition (07/24/25 1246)    Context:  Chronic Illness     Findings of the 6 clinical characteristics of malnutrition:  Energy Intake:  Mild decrease in energy intake  Weight Loss:  No weight loss     Body Fat Loss:  No body fat loss     Muscle Mass Loss:  Mild muscle mass loss    Fluid Accumulation:  Severe Ascites   Strength:  Not Performed    Nutrition Assessment:    Pt admitted due to UGI bleed. He is s/p 7/23 EGD which showed oozing varicies (which were banded). Plan is for paracentesis today with possible discharge 7/25. Pt states wt loss and poor intake PTA. Wt history shows pt's wt is up around 30# over the past year due to ascites. Pt is very hungry for solid food.    Nutrition Related Findings:    no edema, Labs: Glu 206, Meds: Lactulose, PMH: Esophageal Ca, ETOH Cirrhosis, DM Wound Type: None       Current Nutrition Intake & Therapies:    Average Meal Intake: 51-75%     ADULT DIET; Clear Liquid    Anthropometric Measures:  Height: 177.8 cm (5' 10\")  Ideal Body Weight (IBW): 166 lbs (75 kg)    Admission Body Weight: 95.3 kg (210 lb)  Current Body Weight: 103.6 kg (228 lb 6.3 oz) (obtained by RD), 137.6 % IBW. Weight Source: Bed scale  Current BMI (kg/m2): 32.8  Usual Body Weight: 82.1 kg (181 lb) (7/24)     % Weight Change (Calculated): 26.2                    BMI Categories: Obese Class 1 (BMI 30.0-34.9)    Estimated Daily Nutrient Needs:  Energy Requirements Based On: Formula  Weight Used for Energy Requirements: Current  Energy (kcal/day): [place] x 1.2= 2200 kcal  Weight Used for Protein Requirements: Current  Protein (g/day): 0.8-1 g/kg=  g  Method Used for Fluid Requirements:

## 2025-07-24 NOTE — PROGRESS NOTES
Paracentesis to be done this afternoon in IR. After that is complete, orders by Dr. Youngblood to discontinue Cardizem drip and start on 120 mg Cardizem CD.     Patient also requesting after paracentesis he be discharged. Writer messaged Wilmer Jean NP.     I would like him to stay at least until tomorrow as he is still on the octreotide and Protonix drips because he did have significant varices -  Above is message from Wilmer Jean NP.     Writer explained to patient.

## 2025-07-24 NOTE — PROGRESS NOTES
Pt refusing to have labs drawn at this time. Pt upset with the way night shift phlebotomist attempted to draw his blood. Pt states he will get it drawn in the morning when there are different phlebotomist available to attempt. RN educated pt on the importance of lab draws at this time

## 2025-07-25 ENCOUNTER — APPOINTMENT (OUTPATIENT)
Age: 65
DRG: 432 | End: 2025-07-25
Payer: COMMERCIAL

## 2025-07-25 ENCOUNTER — TELEPHONE (OUTPATIENT)
Dept: PRIMARY CARE CLINIC | Age: 65
End: 2025-07-25

## 2025-07-25 LAB
CASE NUMBER:: NORMAL
ECHO AO ROOT DIAM: 3.1 CM
ECHO AO ROOT INDEX: 1.46 CM/M2
ECHO AV AREA PEAK VELOCITY: 2.6 CM2
ECHO AV AREA VTI: 2.6 CM2
ECHO AV AREA/BSA PEAK VELOCITY: 1.2 CM2/M2
ECHO AV AREA/BSA VTI: 1.2 CM2/M2
ECHO AV MEAN GRADIENT: 6 MMHG
ECHO AV MEAN VELOCITY: 1.1 M/S
ECHO AV PEAK GRADIENT: 10 MMHG
ECHO AV PEAK VELOCITY: 1.6 M/S
ECHO AV VELOCITY RATIO: 0.75
ECHO AV VTI: 33.4 CM
ECHO BSA: 2.17 M2
ECHO EST RA PRESSURE: 8 MMHG
ECHO LA AREA 2C: 32.5 CM2
ECHO LA AREA 4C: 25.4 CM2
ECHO LA DIAMETER INDEX: 2.25 CM/M2
ECHO LA DIAMETER: 4.8 CM
ECHO LA MAJOR AXIS: 6.6 CM
ECHO LA MINOR AXIS: 7.4 CM
ECHO LA TO AORTIC ROOT RATIO: 1.55
ECHO LA VOL BP: 99 ML (ref 18–58)
ECHO LA VOL MOD A2C: 116 ML (ref 18–58)
ECHO LA VOL MOD A4C: 76 ML (ref 18–58)
ECHO LA VOL/BSA BIPLANE: 46 ML/M2 (ref 16–34)
ECHO LA VOLUME INDEX MOD A2C: 54 ML/M2 (ref 16–34)
ECHO LA VOLUME INDEX MOD A4C: 36 ML/M2 (ref 16–34)
ECHO LV E' LATERAL VELOCITY: 10.4 CM/S
ECHO LV E' SEPTAL VELOCITY: 6.74 CM/S
ECHO LV EDV A2C: 122 ML
ECHO LV EDV A4C: 106 ML
ECHO LV EDV INDEX A4C: 50 ML/M2
ECHO LV EDV NDEX A2C: 57 ML/M2
ECHO LV EF PHYSICIAN: 55 %
ECHO LV EJECTION FRACTION A2C: 61 %
ECHO LV EJECTION FRACTION A4C: 54 %
ECHO LV EJECTION FRACTION BIPLANE: 59 % (ref 55–100)
ECHO LV ESV A2C: 47 ML
ECHO LV ESV A4C: 48 ML
ECHO LV ESV INDEX A2C: 22 ML/M2
ECHO LV ESV INDEX A4C: 23 ML/M2
ECHO LV FRACTIONAL SHORTENING: 26 % (ref 28–44)
ECHO LV INTERNAL DIMENSION DIASTOLE INDEX: 2.35 CM/M2
ECHO LV INTERNAL DIMENSION DIASTOLIC: 5 CM (ref 4.2–5.9)
ECHO LV INTERNAL DIMENSION SYSTOLIC INDEX: 1.74 CM/M2
ECHO LV INTERNAL DIMENSION SYSTOLIC: 3.7 CM
ECHO LV IVSD: 1.1 CM (ref 0.6–1)
ECHO LV MASS 2D: 207.1 G (ref 88–224)
ECHO LV MASS INDEX 2D: 97.2 G/M2 (ref 49–115)
ECHO LV POSTERIOR WALL DIASTOLIC: 1.1 CM (ref 0.6–1)
ECHO LV RELATIVE WALL THICKNESS RATIO: 0.44
ECHO LVOT AREA: 3.5 CM2
ECHO LVOT AV VTI INDEX: 0.76
ECHO LVOT DIAM: 2.1 CM
ECHO LVOT MEAN GRADIENT: 3 MMHG
ECHO LVOT PEAK GRADIENT: 6 MMHG
ECHO LVOT PEAK VELOCITY: 1.2 M/S
ECHO LVOT STROKE VOLUME INDEX: 41.3 ML/M2
ECHO LVOT SV: 87.9 ML
ECHO LVOT VTI: 25.4 CM
ECHO MV A VELOCITY: 0.78 M/S
ECHO MV AREA VTI: 2.2 CM2
ECHO MV E DECELERATION TIME (DT): 232 MS
ECHO MV E VELOCITY: 0.85 M/S
ECHO MV E/A RATIO: 1.09
ECHO MV E/E' LATERAL: 8.17
ECHO MV E/E' RATIO (AVERAGED): 10.39
ECHO MV E/E' SEPTAL: 12.61
ECHO MV LVOT VTI INDEX: 1.54
ECHO MV MAX VELOCITY: 1 M/S
ECHO MV MEAN GRADIENT: 2 MMHG
ECHO MV MEAN VELOCITY: 0.7 M/S
ECHO MV PEAK GRADIENT: 4 MMHG
ECHO MV VTI: 39.2 CM
ECHO RA AREA 4C: 15 CM2
ECHO RA END SYSTOLIC VOLUME APICAL 4 CHAMBER INDEX BSA: 18 ML/M2
ECHO RA VOLUME: 38 ML
ECHO RIGHT VENTRICULAR SYSTOLIC PRESSURE (RVSP): 32 MMHG
ECHO RV BASAL DIMENSION: 3.7 CM
ECHO RV FREE WALL PEAK S': 19.5 CM/S
ECHO RV TAPSE: 2.2 CM (ref 1.7–?)
ECHO TV REGURGITANT MAX VELOCITY: 2.44 M/S
ECHO TV REGURGITANT PEAK GRADIENT: 24 MMHG
GLUCOSE BLD-MCNC: 124 MG/DL (ref 75–110)
GLUCOSE BLD-MCNC: 127 MG/DL (ref 75–110)
GLUCOSE BLD-MCNC: 144 MG/DL (ref 75–110)
GLUCOSE BLD-MCNC: 146 MG/DL (ref 75–110)
HCT VFR BLD AUTO: 27 % (ref 41–53)
HCT VFR BLD AUTO: 27.4 % (ref 41–53)
HCT VFR BLD AUTO: 29.6 % (ref 41–53)
HGB BLD-MCNC: 7.8 G/DL (ref 13.5–17.5)
HGB BLD-MCNC: 8.1 G/DL (ref 13.5–17.5)
HGB BLD-MCNC: 8.8 G/DL (ref 13.5–17.5)
SPECIMEN DESCRIPTION: NORMAL

## 2025-07-25 PROCEDURE — 6370000000 HC RX 637 (ALT 250 FOR IP): Performed by: INTERNAL MEDICINE

## 2025-07-25 PROCEDURE — 2580000003 HC RX 258: Performed by: INTERNAL MEDICINE

## 2025-07-25 PROCEDURE — APPSS30 APP SPLIT SHARED TIME 16-30 MINUTES: Performed by: NURSE PRACTITIONER

## 2025-07-25 PROCEDURE — 85014 HEMATOCRIT: CPT

## 2025-07-25 PROCEDURE — 6360000002 HC RX W HCPCS: Performed by: INTERNAL MEDICINE

## 2025-07-25 PROCEDURE — 82947 ASSAY GLUCOSE BLOOD QUANT: CPT

## 2025-07-25 PROCEDURE — 6370000000 HC RX 637 (ALT 250 FOR IP): Performed by: NURSE PRACTITIONER

## 2025-07-25 PROCEDURE — 99233 SBSQ HOSP IP/OBS HIGH 50: CPT

## 2025-07-25 PROCEDURE — 93306 TTE W/DOPPLER COMPLETE: CPT

## 2025-07-25 PROCEDURE — 85018 HEMOGLOBIN: CPT

## 2025-07-25 PROCEDURE — 93306 TTE W/DOPPLER COMPLETE: CPT | Performed by: INTERNAL MEDICINE

## 2025-07-25 PROCEDURE — 99233 SBSQ HOSP IP/OBS HIGH 50: CPT | Performed by: INTERNAL MEDICINE

## 2025-07-25 PROCEDURE — 2060000000 HC ICU INTERMEDIATE R&B

## 2025-07-25 PROCEDURE — 36415 COLL VENOUS BLD VENIPUNCTURE: CPT

## 2025-07-25 PROCEDURE — 2500000003 HC RX 250 WO HCPCS: Performed by: INTERNAL MEDICINE

## 2025-07-25 RX ORDER — FUROSEMIDE 20 MG/1
20 TABLET ORAL DAILY
Status: DISCONTINUED | OUTPATIENT
Start: 2025-07-25 | End: 2025-07-26 | Stop reason: HOSPADM

## 2025-07-25 RX ORDER — SPIRONOLACTONE 25 MG/1
50 TABLET ORAL DAILY
Status: DISCONTINUED | OUTPATIENT
Start: 2025-07-25 | End: 2025-07-26 | Stop reason: HOSPADM

## 2025-07-25 RX ORDER — PANTOPRAZOLE SODIUM 40 MG/1
40 TABLET, DELAYED RELEASE ORAL
Status: DISCONTINUED | OUTPATIENT
Start: 2025-07-25 | End: 2025-07-26 | Stop reason: HOSPADM

## 2025-07-25 RX ADMIN — SUCRALFATE 1 G: 1 TABLET ORAL at 17:12

## 2025-07-25 RX ADMIN — PANTOPRAZOLE SODIUM 8 MG/HR: 40 INJECTION, POWDER, FOR SOLUTION INTRAVENOUS at 09:13

## 2025-07-25 RX ADMIN — NADOLOL 20 MG: 20 TABLET ORAL at 10:17

## 2025-07-25 RX ADMIN — NADOLOL 20 MG: 20 TABLET ORAL at 20:45

## 2025-07-25 RX ADMIN — SUCRALFATE 1 G: 1 TABLET ORAL at 05:56

## 2025-07-25 RX ADMIN — Medication 3 MG: at 22:50

## 2025-07-25 RX ADMIN — SODIUM CHLORIDE, PRESERVATIVE FREE 10 ML: 5 INJECTION INTRAVENOUS at 20:45

## 2025-07-25 RX ADMIN — SUCRALFATE 1 G: 1 TABLET ORAL at 20:45

## 2025-07-25 RX ADMIN — GABAPENTIN 100 MG: 100 CAPSULE ORAL at 20:45

## 2025-07-25 RX ADMIN — FUROSEMIDE 20 MG: 20 TABLET ORAL at 15:55

## 2025-07-25 RX ADMIN — CEFTRIAXONE SODIUM 1000 MG: 2 INJECTION, POWDER, FOR SOLUTION INTRAMUSCULAR; INTRAVENOUS at 10:49

## 2025-07-25 RX ADMIN — PANTOPRAZOLE SODIUM 40 MG: 40 TABLET, DELAYED RELEASE ORAL at 17:12

## 2025-07-25 RX ADMIN — SUCRALFATE 1 G: 1 TABLET ORAL at 10:09

## 2025-07-25 RX ADMIN — GABAPENTIN 100 MG: 100 CAPSULE ORAL at 10:10

## 2025-07-25 RX ADMIN — DILTIAZEM HYDROCHLORIDE 120 MG: 120 CAPSULE, COATED, EXTENDED RELEASE ORAL at 10:09

## 2025-07-25 RX ADMIN — SPIRONOLACTONE 50 MG: 25 TABLET ORAL at 15:55

## 2025-07-25 NOTE — PROGRESS NOTES
[facility]   IN-PATIENT SERVICE   [facility]  Progress note            Date:   7/25/2025  Patient name:  Frank Lisa  Date of admission:  7/23/2025  5:22 AM  MRN:   [id number]  Account:  [id number]  YOB: 1960  PCP:    Lopez Rosario PA  Room:   2081/2081-01  Code Status:    Full Code    Chief Complaint:     Chief Complaint   Patient presents with    Hematemesis     Pt c/o dark red emesis x2 starting today        History Obtained From:     patient    History of Present Illness:     The patient is a 64 y.o.  Non- / non  male who presents with Hematemesis (Pt c/o dark red emesis x2 starting today )   and he is admitted to the hospital for the management of      Patient is 64-year-old male past medical history of hepatitis C, history of alcoholic liver cirrhosis, with portal hypertension, s/p, diabetes, decompensated with recurrent ascites and varices, history of esophageal cancer, recent diagnosis of adenocarcinoma, colon, follows up with oncology and neurosurgery as outpatient, presented to the ER with 2 episodes of hematemesis  Patient denies any chest pain shortness of breath  Was also found to be in A-fib with RVR, no chest pain no shortness of breath no abdominal pain, requiring frequent paracentesis last 1 was 2 weeks ago,    Past Medical History:     Past Medical History:   Diagnosis Date    Abdominal pain     Abnormal EKG     Acute deep vein thrombosis (DVT) of brachial vein of right upper extremity (HCC) 01/07/2023    Also- Superficial venous thrombosis of the cephalic vein in the forearm    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)     Alcoholic cirrhosis of liver with ascites (HCC)     AMS (altered mental status)     Anemia 04/13/2022    Anemia     Anxiety     Arthritis     Back pain, chronic     dr. Mc, orthopedic, every 3-4 months, gets steroid injection    Lezama esophagus     Bleeding gastric varices 12/29/2022    Bleeds easily     Bowel perforation  (HCC) 03/25/2023    BPH (benign prostatic hypertrophy)     Bruises easily     Cholelithiasis     Cirrhosis (HCC)     COPD (chronic obstructive pulmonary disease) (HCC)     COVID-19 12/2020    pt reports he had a positive test while at [facility] in 2020, was asymptomatic    COVID-19 vaccine series completed 5/20/2021, 6/22/2021    Moderna 5/20/2021, 6/22/2021    DDD (degenerative disc disease), lumbar     Depression     Dizziness     Esophageal adenocarcinoma (HCC)     Esophageal cancer (HCC)     INVASIVE ADENOCARCINOMA ARISING IN TUBULAR ADENOMA WITH HIGH GRADE DYSPLASIA, ASSOCIATED WITH FOCAL INTESTINAL METAPLASIA     Esophageal varices (HCC)     Extremity numbness     Fatty liver     GERD (gastroesophageal reflux disease)     GI bleed     Hallucinations     Hearing loss     uses bilateral aides    Heart murmur     Hep C w/o coma, chronic (HCC)     Hiatal hernia     History of alcohol abuse     6-12 beers a day; quit drinking 2019    History of blood transfusion     History of colon polyps 2016    History of tobacco abuse     [place] (hard of hearing)     Hyperlipidemia     Hypertension     Hyponatremia 07/20/2016    Hypotension 12/20/2021    Ileus (HCC) 04/12/2023    Insomnia     Jaundice     Joint pain     Leg pain     Loss of appetite     Lower extremity weakness     Lumbar radiculitis 11/08/2016    Malignant neoplasm of esophagus (HCC) 03/27/2024    Mastoid disorder, bilateral 12/31/2022    biLateral mastoid effusion    Neck pain     Neuropathy     Pericardial effusion     Pneumonia     PONV (postoperative nausea and vomiting)     Portal hypertension (HCC)     SBO (small bowel obstruction) (HCC) 04/01/2024    Sciatica     Secondary esophageal varices (HCC) 06/07/2022    Shortness of breath     Sleep difficulties     Small bowel obstruction (HCC) 03/31/2024    Spinal stenosis     Stomach ulcer     hx of    Thrombocytopenia 12/23/2020    w anemia    TIA (transient ischemic attack)     Tubular adenoma of colon

## 2025-07-25 NOTE — PROGRESS NOTES
Date:   7/25/2025  Patient name: Frank Lisa  Date of admission:  7/23/2025  5:22 AM  MRN:   [id number]  YOB: 1960  PCP: Lopez Rosario PA    Reason for Admission: GI bleed [K92.2]  Elevated LFTs [R79.89]  Hematemesis with nausea [K92.0]    Cardiology follow-up: A-fib with RVR        Referring physician: Dr. Kathi Russo     Impression     Admitted 7/23/2025 with hematemesis, abdominal pain, diarrhea, newly diagnosed esophageal cancer  EGD 7/23/2025 esophageal varices, 1 varices with minor bruising of blood, 3 bands successfully placed, deflation of varices noted completely/procedure done by Dr. Joseluis Olivia.  On admission rhythm was sinus rhythm converted to A-fib with RVR around 9 AM  Severe hypomagnesemia on admission most likely secondary to diarrhea  Previous history of A-fib short lasting couple of years  History of palpitation on exertion  No history of coronary intervention  Normal LV systolic function, ejection fraction 60 to 65%, mild to moderate LVH, 2D echo 7/22/2024  History of hepatitis C  Liver cirrhosis, alcohol abuse, ascites multiple paracentesis, last paracentesis was 2 weeks ago  Severe portal hypertension, status post TIPS(transjugular intrahepatic portosystemic shunt)  Anemia thrombocytopenia  Protein malnutrition  History of bowel perforation requiring right hemicolectomy with end ileostomy reversed on 10/10/2023  History of TIA x 3 presented with a speech problem, drooling, facial muscle weakness     Quit smoking 2016    History of present illness  64-year-old male who lives alone got hospitalized on 7/23/2025 with abdominal pain, diarrhea, hematemesis.  Around midnight he had hematemesis preceded by significant abdominal pain and he was also having diarrhea.  He called EMS.     He has past medical history of his of his cell carcinoma, treated with chemoradiation, liver cirrhosis, hepatitis C, history of heavy alcohol consumption, portal hypertension, status post TIPS  portosystemic shunt)     Acute metabolic encephalopathy     Lezama's esophagus with dysplasia     Mastoid disorder, bilateral     Acute encephalopathy     Chronic hepatitis C without hepatic coma (HCC)     Pneumonia of right lower lobe due to infectious organism     Swelling of joint of upper arm, right     Anasarca     Lightheadedness     Alcohol withdrawal syndrome, with delirium (HCC)     Hemorrhage of rectum and anus     Electrolyte imbalance     Hyponatremia     Pain of upper abdomen     Moderate malnutrition     Delirium due to another medical condition     ROBYN (acute kidney injury)     Ileostomy in place (HCC)     Peristomal dermatitis associated with moisture     Cellulitis     Coffee ground emesis     Substance abuse (HCC)     History of abdominal surgery     Status post reversal of ileostomy     Neck pain, chronic     Peripheral polyneuropathy     Cubital tunnel syndrome of both upper extremities     Intractable nausea and vomiting     Right lower quadrant abdominal pain     Nausea     Elevated CEA     Abnormal abdominal CT scan     Generalized weakness     High carcinoembryonic antigen (CEA)     Wrist arthritis     Pain in joint involving right ankle and foot     Syncope     Other microscopic hematuria     Dizziness     Acute pancreatitis without infection or necrosis     Small bowel obstruction (HCC)     Closed fracture of third thoracic vertebra (HCC)     Age-related osteoporosis with current pathological fracture with routine healing     Acute midline thoracic back pain     T12 compression fracture, sequela     AMS (altered mental status)     Dysphagia, oropharyngeal phase     Iron deficiency anemia due to chronic blood loss     Closed compression fracture of L1 lumbar vertebra, sequela     Alcohol abuse with withdrawal (HCC)     Closed wedge compression fracture of T10 vertebra (HCC)     Avascular necrosis of left femur (HCC)     Spinal stenosis in cervical region     Non-traumatic compression

## 2025-07-25 NOTE — CARE COORDINATION
Case Management   Daily Progress Note       Patient Name: Frank Lisa                   YOB: 1960  Diagnosis: GI bleed [K92.2]  Elevated LFTs [R79.89]  Hematemesis with nausea [K92.0]                       GMLOS: 4.9 days  Length of Stay: 2  days    Readmission Risk (Low < 19, Mod (19-27), High > 27): Readmission Risk Score: 26.7      Patient is alert and oriented.    Spoke with Patient, and Current Transitional Plan is:    [] Home Independently    [x] Home with [facility] - [facility]. Denies need for SNF.    [] Skilled Nursing Facility    [] Acute Rehabilitation    [] Long Term Acute Care (LTAC)    [] Other:     Additional Notes:     Active order for IV Rocephin.    Sandostatin and Protonix gtt's d/c'ed per GI.    Soft diet.    Hgb 7.8 today.    S/P paracentesis 7/24 with 4.8L removed.    Electronically signed by Leonila Chapa RN on 7/25/2025 at 12:28 PM     Referral sent to [facility] via [facility] to inform that their current patient has been admitted and will likely d/c tomorrow.    Follow up scheduled with PCP 7/30 @ 2:30pm.    Electronically signed by Leonila Chapa RN on 7/25/2025 at 12:33 PM

## 2025-07-25 NOTE — PLAN OF CARE
Problem: Discharge Planning  Goal: Discharge to home or other facility with appropriate resources  7/25/2025 1632 by Deanna Nunn RN  Outcome: Progressing     Problem: Safety - Adult  Goal: Free from fall injury  7/25/2025 1632 by Deanna Nunn RN  Outcome: Progressing     Problem: Pain  Goal: Verbalizes/displays adequate comfort level or baseline comfort level  7/25/2025 1632 by Deanna Nunn RN  Outcome: Progressing     Problem: Gastrointestinal - Adult  Goal: Minimal or absence of nausea and vomiting  7/25/2025 1632 by Deanna Nunn RN  Outcome: Progressing     Problem: Gastrointestinal - Adult  Goal: Maintains or returns to baseline bowel function  7/25/2025 1632 by Deanna Nunn RN  Outcome: Progressing     Problem: Gastrointestinal - Adult  Goal: Maintains adequate nutritional intake  7/25/2025 1632 by Deanna Nunn RN  Outcome: Progressing     Problem: Nutrition Deficit:  Goal: Optimize nutritional status  7/25/2025 1632 by Deanna Nunn RN  Outcome: Progressing     Problem: ABCDS Injury Assessment  Goal: Absence of physical injury  7/25/2025 1632 by Deanna Nunn RN  Outcome: Progressing

## 2025-07-25 NOTE — TELEPHONE ENCOUNTER
RN (Pam) from [place] called to report patient was admitted into the hospital on 07/23/2025 for hyperemesis. She states patient is expected to be discharged tomorrow.

## 2025-07-25 NOTE — PLAN OF CARE
Problem: Discharge Planning  Goal: Discharge to home or other facility with appropriate resources  7/25/2025 0308 by Frank Murphy RN  Outcome: Progressing     Problem: Safety - Adult  Goal: Free from fall injury  7/25/2025 0308 by Frank Murphy RN  Outcome: Progressing     Problem: Pain  Goal: Verbalizes/displays adequate comfort level or baseline comfort level  7/25/2025 0308 by Frank Murphy RN  Outcome: Progressing     Problem: Gastrointestinal - Adult  Goal: Minimal or absence of nausea and vomiting  7/25/2025 0308 by Frank Murphy RN  Outcome: Progressing     Problem: Gastrointestinal - Adult  Goal: Maintains or returns to baseline bowel function  7/25/2025 0308 by Frank Murphy RN  Outcome: Progressing     Problem: Gastrointestinal - Adult  Goal: Maintains adequate nutritional intake  7/25/2025 0308 by Frank Murphy RN  Outcome: Progressing     Problem: Nutrition Deficit:  Goal: Optimize nutritional status  7/25/2025 0308 by Frank Murphy RN  Outcome: Progressing     Problem: ABCDS Injury Assessment  Goal: Absence of physical injury  Outcome: Progressing

## 2025-07-25 NOTE — PROGRESS NOTES
Lack of Transportation (Non-Medical): No   Physical Activity: Inactive (2022)    Exercise Vital Sign     Days of Exercise per Week: 0 days     Minutes of Exercise per Session: 0 min   Stress: Not on file   Social Connections: Not on file   Intimate Partner Violence: Unknown (2024)    Received from [facility] [place], [facility] [place]    [place] Safety & Environment     Fear of Current or Ex-Partner: Not on file     Emotionally Abused: Not on file     Physically Abused: Not on file     Sexually Abused: Not on file     Physically or Sexually Abused: Not on file   Housing Stability: Low Risk  (2025)    Housing Stability Vital Sign     Unable to Pay for Housing in the Last Year: No     Number of Times Moved in the Last Year: 0     Homeless in the Last Year: No       Vitals:  BP (!) 117/98   Pulse 70   Temp 98.4 °F (36.9 °C) (Oral)   Resp 18   Ht 1.778 m (5' 10\")   Wt 95.3 kg (210 lb)   SpO2 93%   BMI 30.13 kg/m²   Temp (24hrs), Av.1 °F (36.7 °C), Min:97.9 °F (36.6 °C), Max:98.4 °F (36.9 °C)    Recent Labs     25  1648 25  0615 25  1133   POCGLU 139* 112* 144* 127*       I/O (24Hr):    Intake/Output Summary (Last 24 hours) at 2025 1152  Last data filed at 2025 1134  Gross per 24 hour   Intake --   Output 375 ml   Net -375 ml       Labs:    LABORATORY DATA: Reviewed  CBC:  Recent Labs     25  0545 25  0634 25  1517 25  2235 25  0632   WBC 6.6 7.2  --   --   --    HGB 9.3* 7.8* 8.0* 7.8* 7.8*   MCV 83.9 87.5  --   --   --    RDW 19.1* 19.2*  --   --   --    * 95*  --   --   --        ANEMIA STUDIES:  No results for input(s): \"TIBC\", \"FERRITIN\", \"KOGMGVMM58\", \"FOLATE\", \"OCCULTBLD\" in the last 72 hours.    Invalid input(s): \"LABIRON\"    BMP:  Recent Labs     25  0545 25  1445 25  0634     --  137   K 3.7  --  3.6*     --  105   CO2 21  --  19*   BUN 7*  --  9   CREATININE 1.1   Abdominal pain     Abnormal EKG     Acute deep vein thrombosis (DVT) of brachial vein of right upper extremity (HCC) 01/07/2023    Also- Superficial venous thrombosis of the cephalic vein in the forearm    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)     Alcoholic cirrhosis of liver with ascites (HCC)     AMS (altered mental status)     Anemia 04/13/2022    Anemia     Anxiety     Arthritis     Back pain, chronic     dr. Mc, orthopedic, every 3-4 months, gets steroid injection    [place] esophagus     Bleeding gastric varices 12/29/2022    Bleeds easily     Bowel perforation (HCC) 03/25/2023    BPH (benign prostatic hypertrophy)     Bruises easily     Cholelithiasis     Cirrhosis (HCC)     COPD (chronic obstructive pulmonary disease) (HCC)     COVID-19 12/2020    pt reports he had a positive test while at [facility] in 2020, was asymptomatic    COVID-19 vaccine series completed 5/20/2021, 6/22/2021    Moderna 5/20/2021, 6/22/2021    DDD (degenerative disc disease), lumbar     Depression     Dizziness     Esophageal adenocarcinoma (HCC)     Esophageal cancer (HCC)     INVASIVE ADENOCARCINOMA ARISING IN TUBULAR ADENOMA WITH HIGH GRADE DYSPLASIA, ASSOCIATED WITH FOCAL INTESTINAL METAPLASIA     Esophageal varices (HCC)     Extremity numbness     Fatty liver     GERD (gastroesophageal reflux disease)     GI bleed     Hallucinations     Hearing loss     uses bilateral aides    Heart murmur     Hep C w/o coma, chronic (HCC)     Hiatal hernia     History of alcohol abuse     6-12 beers a day; quit drinking 2019    History of blood transfusion     History of colon polyps 2016    History of tobacco abuse     [place] (hard of hearing)     Hyperlipidemia     Hypertension     Hyponatremia 07/20/2016    Hypotension 12/20/2021    Ileus (HCC) 04/12/2023    Insomnia     Jaundice     Joint pain     Leg pain     Loss of appetite     Lower extremity weakness     Lumbar radiculitis 11/08/2016    Malignant neoplasm of esophagus (HCC) 03/27/2024

## 2025-07-25 NOTE — DISCHARGE INSTR - COC
Continuity of Care Form    Patient Name: Frank Lisa   :  1960  MRN:  [id number]    Admit date:  2025  Discharge date:  25    Code Status Order: Full Code   Advance Directives:    Date/Time Healthcare Directive Type of Healthcare Directive Copy in Chart Healthcare Agent Appointed Healthcare Agent's Name Healthcare Agent's Phone Number    25 1545 Yes, patient has an advance directive for healthcare treatment  Living will;Durable power of  for health care  Yes, copy in chart  --  ex wife Anurag  --             Admitting Physician:  Kathi Russo MD  PCP: Lopez Rosario PA    Discharging Nurse: AKIRA Adams RN  Discharging Hospital Unit/Room#:   Discharging Unit Phone Number: [phone]    Emergency Contact:   Extended Emergency Contact Information  Primary Emergency Contact: Maria LNilam  Address: [address] [place] [address]  Home Phone: [phone]  Mobile Phone: [phone]  Relation: Ex-Spouse  Secondary Emergency Contact: Nilam Houston  Address: [address] [place] [address]  Home Phone: [phone]  Mobile Phone: [phone]  Relation: Ex-Spouse    Past Surgical History:  Past Surgical History:   Procedure Laterality Date    APPENDECTOMY      BUNIONECTOMY      twice on right side    BUNIONECTOMY Left     CARPAL TUNNEL RELEASE Right     COLON SURGERY  10/10/2023    EXPLORATORY LAPAROTOMY, REVERSAL ILEOSTOMY WITH ILEOCOLOSTOMY, LYSIS OF ADHESIONS,    COLONOSCOPY      at age 39    COLONOSCOPY  10/05/2016    polyps-pathology tubular adenoma, and abnormal looking mucosa right colon-pathology-tubular adenoma    COLONOSCOPY N/A 2018    COLONOSCOPY POLYPECTOMY COLD BIOPSY performed by Augusto Cordova MD at [facility] OR    COLONOSCOPY  2018    Small polyp in the sigmoid colon and excised with biopsy forceps--tubular adenoma    COLONOSCOPY N/A 2022    COLONOSCOPY POLYPECTOMY performed by  Augusto Cordova MD at [facility] OR    COLONOSCOPY N/A 5/27/2025    COLONOSCOPY BIOPSY, TATOOING AT TRANSVERSE COLON MASS SITE performed by Dixon Olivia MD at [facility] ENDO    COLONOSCOPY N/A 6/18/2025    COLONOSCOPY DIAGNOSTIC performed by Jose A Coleman MD at [facility] OR    COLOSTOMY      later reversed    CYSTOSCOPY N/A 07/01/2024    CYSTOSCOPY performed by Edinson Griffith MD at [facility] OR    ENDOSCOPY, COLON, DIAGNOSTIC      EGD    ESOPHAGOGASTRODUODENOSCOPY  12/29/2022    ESOPHAGOGASTRODUODENOSCOPY N/A 01/03/2023    IR PORT PLACEMENT > 5 YEARS  04/19/2021    IR PORT PLACEMENT EQUAL OR GREATER THAN 5 YEARS 4/19/2021 [facility] SPECIAL PROCEDURES    IR TIPS INSERTION  12/01/2022    IR TIPS INSERTION 12/1/2022 Scar VELEZ MD [facility] SPECIAL PROCEDURES    KNEE SURGERY Left     cyst removed    LAPAROTOMY N/A 03/25/2023    LAPAROTOMY EXPLORATORY, RIGHT COLECTOMY, CREATION IF END ILEOSTOMY performed by Kvng Waddell DO at [facility] OR    NASAL SEPTUM SURGERY      NASOTRACHEAL SUCTIONING  10/18/2023    NERVE BLOCK Right 11/23/2020    NERVE BLOCK RIGHT CERVICAL STEROID INJECTION  C3-C6 performed by Dheeraj Lau MD at [facility] OR    OTHER SURGICAL HISTORY  01/04/2016    steroid injection C7 T1    OTHER SURGICAL HISTORY  11/21/2016    Bilateral Lumbar CACHORRO L4-L5 injections    OTHER SURGICAL HISTORY  12/19/2016    lumbar steroid injection    OTHER SURGICAL HISTORY  09/28/2018    BILATERAL L5 CACHORRO (N/A Back)    OTHER SURGICAL HISTORY Right 11/23/2020    cervical injection    PAIN MANAGEMENT PROCEDURE Left 07/09/2020    EPIDURAL STEROID INJECTION LEFT L4 L5 performed by Dheeraj Lau MD at [facility] OR    PAIN MANAGEMENT PROCEDURE Left 07/20/2020    LEFT L4 L5 EPIDURAL STEROID INJECTION performed by Dheeraj Lau MD at [facility] OR    PAIN MANAGEMENT PROCEDURE Bilateral 08/17/2020    LUMBAR FACET BILATERAL L2-L5 performed by Dheeraj Lau MD at [facility] OR    PAIN MANAGEMENT PROCEDURE Bilateral 12/07/2020    NERVE BLOCK BILATERAL

## 2025-07-26 VITALS
RESPIRATION RATE: 16 BRPM | DIASTOLIC BLOOD PRESSURE: 68 MMHG | TEMPERATURE: 98.1 F | OXYGEN SATURATION: 98 % | HEART RATE: 70 BPM | HEIGHT: 70 IN | BODY MASS INDEX: 30.06 KG/M2 | SYSTOLIC BLOOD PRESSURE: 126 MMHG | WEIGHT: 210 LBS

## 2025-07-26 LAB
ALBUMIN SERPL-MCNC: 2.1 G/DL (ref 3.5–5.2)
ALP SERPL-CCNC: 81 U/L (ref 40–129)
ALT SERPL-CCNC: 9 U/L (ref 10–50)
ANION GAP SERPL CALCULATED.3IONS-SCNC: 10 MMOL/L (ref 9–16)
AST SERPL-CCNC: 36 U/L (ref 10–50)
BASOPHILS # BLD: 0.03 K/UL (ref 0–0.2)
BASOPHILS NFR BLD: 1 % (ref 0–2)
BILIRUB SERPL-MCNC: 1.1 MG/DL (ref 0–1.2)
BUN SERPL-MCNC: 7 MG/DL (ref 8–23)
CALCIUM SERPL-MCNC: 7.8 MG/DL (ref 8.6–10.4)
CHLORIDE SERPL-SCNC: 107 MMOL/L (ref 98–107)
CO2 SERPL-SCNC: 24 MMOL/L (ref 20–31)
CREAT SERPL-MCNC: 1 MG/DL (ref 0.7–1.2)
EOSINOPHIL # BLD: 0.19 K/UL (ref 0–0.44)
EOSINOPHILS RELATIVE PERCENT: 3 % (ref 0–4)
ERYTHROCYTE [DISTWIDTH] IN BLOOD BY AUTOMATED COUNT: 19.2 % (ref 11.5–14.9)
GFR, ESTIMATED: 84 ML/MIN/1.73M2
GLUCOSE BLD-MCNC: 124 MG/DL (ref 75–110)
GLUCOSE BLD-MCNC: 133 MG/DL (ref 75–110)
GLUCOSE SERPL-MCNC: 118 MG/DL (ref 74–99)
HCT VFR BLD AUTO: 26.7 % (ref 41–53)
HCT VFR BLD AUTO: 28.4 % (ref 41–53)
HGB BLD-MCNC: 8.1 G/DL (ref 13.5–17.5)
HGB BLD-MCNC: 8.4 G/DL (ref 13.5–17.5)
IMM GRANULOCYTES # BLD AUTO: <0.03 K/UL (ref 0–0.3)
IMM GRANULOCYTES NFR BLD: 0 %
LYMPHOCYTES NFR BLD: 0.72 K/UL (ref 1.1–3.7)
LYMPHOCYTES RELATIVE PERCENT: 12 % (ref 24–44)
MCH RBC QN AUTO: 25.9 PG (ref 26–34)
MCHC RBC AUTO-ENTMCNC: 30.3 G/DL (ref 31–37)
MCV RBC AUTO: 85.3 FL (ref 80–100)
MONOCYTES NFR BLD: 0.88 K/UL (ref 0.1–1.2)
MONOCYTES NFR BLD: 15 % (ref 3–12)
NEUTROPHILS NFR BLD: 69 % (ref 36–66)
NEUTS SEG NFR BLD: 4.07 K/UL (ref 1.5–8.1)
NRBC BLD-RTO: 0 PER 100 WBC
PLATELET # BLD AUTO: 100 K/UL (ref 150–450)
PMV BLD AUTO: 10.8 FL (ref 8–13.5)
POTASSIUM SERPL-SCNC: 3.6 MMOL/L (ref 3.7–5.3)
PROT SERPL-MCNC: 4.5 G/DL (ref 6.6–8.7)
RBC # BLD AUTO: 3.13 M/UL (ref 4.21–5.77)
SODIUM SERPL-SCNC: 141 MMOL/L (ref 136–145)
WBC OTHER # BLD: 5.9 K/UL (ref 3.5–11)

## 2025-07-26 PROCEDURE — 82947 ASSAY GLUCOSE BLOOD QUANT: CPT

## 2025-07-26 PROCEDURE — 6370000000 HC RX 637 (ALT 250 FOR IP)

## 2025-07-26 PROCEDURE — 6360000002 HC RX W HCPCS: Performed by: INTERNAL MEDICINE

## 2025-07-26 PROCEDURE — 6370000000 HC RX 637 (ALT 250 FOR IP): Performed by: INTERNAL MEDICINE

## 2025-07-26 PROCEDURE — 99233 SBSQ HOSP IP/OBS HIGH 50: CPT | Performed by: STUDENT IN AN ORGANIZED HEALTH CARE EDUCATION/TRAINING PROGRAM

## 2025-07-26 PROCEDURE — 85014 HEMATOCRIT: CPT

## 2025-07-26 PROCEDURE — 80053 COMPREHEN METABOLIC PANEL: CPT

## 2025-07-26 PROCEDURE — 99239 HOSP IP/OBS DSCHRG MGMT >30: CPT

## 2025-07-26 PROCEDURE — 36415 COLL VENOUS BLD VENIPUNCTURE: CPT

## 2025-07-26 PROCEDURE — 2580000003 HC RX 258: Performed by: INTERNAL MEDICINE

## 2025-07-26 PROCEDURE — APPSS30 APP SPLIT SHARED TIME 16-30 MINUTES: Performed by: NURSE PRACTITIONER

## 2025-07-26 PROCEDURE — 6370000000 HC RX 637 (ALT 250 FOR IP): Performed by: NURSE PRACTITIONER

## 2025-07-26 PROCEDURE — 2500000003 HC RX 250 WO HCPCS: Performed by: INTERNAL MEDICINE

## 2025-07-26 PROCEDURE — 85018 HEMOGLOBIN: CPT

## 2025-07-26 PROCEDURE — 85025 COMPLETE CBC W/AUTO DIFF WBC: CPT

## 2025-07-26 RX ORDER — SULFAMETHOXAZOLE AND TRIMETHOPRIM 800; 160 MG/1; MG/1
1 TABLET ORAL DAILY
Qty: 15 TABLET | Refills: 0 | Status: SHIPPED | OUTPATIENT
Start: 2025-07-26 | End: 2025-07-26 | Stop reason: HOSPADM

## 2025-07-26 RX ORDER — FUROSEMIDE 20 MG/1
20 TABLET ORAL DAILY
Qty: 30 TABLET | Refills: 1 | Status: SHIPPED | OUTPATIENT
Start: 2025-07-27

## 2025-07-26 RX ORDER — DILTIAZEM HYDROCHLORIDE 120 MG/1
120 CAPSULE, COATED, EXTENDED RELEASE ORAL DAILY
Qty: 30 CAPSULE | Refills: 1 | Status: SHIPPED | OUTPATIENT
Start: 2025-07-27

## 2025-07-26 RX ORDER — PANTOPRAZOLE SODIUM 40 MG/1
40 TABLET, DELAYED RELEASE ORAL
Qty: 30 TABLET | Refills: 3 | Status: SHIPPED | OUTPATIENT
Start: 2025-07-27

## 2025-07-26 RX ORDER — LACTULOSE 10 G/15ML
30 SOLUTION ORAL 4 TIMES DAILY
Qty: 5400 ML | Refills: 1 | Status: SHIPPED | OUTPATIENT
Start: 2025-07-26

## 2025-07-26 RX ORDER — SPIRONOLACTONE 50 MG/1
50 TABLET, FILM COATED ORAL DAILY
Qty: 30 TABLET | Refills: 1 | Status: SHIPPED | OUTPATIENT
Start: 2025-07-27

## 2025-07-26 RX ORDER — SUCRALFATE 1 G/1
1 TABLET ORAL
Qty: 120 TABLET | Refills: 3 | Status: SHIPPED | OUTPATIENT
Start: 2025-07-26

## 2025-07-26 RX ORDER — CIPROFLOXACIN 500 MG/1
500 TABLET, FILM COATED ORAL DAILY
Qty: 15 TABLET | Refills: 0 | Status: SHIPPED | OUTPATIENT
Start: 2025-07-26 | End: 2025-07-26 | Stop reason: HOSPADM

## 2025-07-26 RX ADMIN — SUCRALFATE 1 G: 1 TABLET ORAL at 12:07

## 2025-07-26 RX ADMIN — SPIRONOLACTONE 50 MG: 25 TABLET ORAL at 09:04

## 2025-07-26 RX ADMIN — PANTOPRAZOLE SODIUM 40 MG: 40 TABLET, DELAYED RELEASE ORAL at 15:31

## 2025-07-26 RX ADMIN — SUCRALFATE 1 G: 1 TABLET ORAL at 05:57

## 2025-07-26 RX ADMIN — FUROSEMIDE 20 MG: 20 TABLET ORAL at 09:04

## 2025-07-26 RX ADMIN — LACTULOSE 30 G: 20 SOLUTION ORAL at 09:03

## 2025-07-26 RX ADMIN — POTASSIUM BICARBONATE 20 MEQ: 782 TABLET, EFFERVESCENT ORAL at 12:07

## 2025-07-26 RX ADMIN — SUCRALFATE 1 G: 1 TABLET ORAL at 15:31

## 2025-07-26 RX ADMIN — DILTIAZEM HYDROCHLORIDE 120 MG: 120 CAPSULE, COATED, EXTENDED RELEASE ORAL at 09:04

## 2025-07-26 RX ADMIN — GABAPENTIN 100 MG: 100 CAPSULE ORAL at 09:04

## 2025-07-26 RX ADMIN — CEFTRIAXONE SODIUM 2000 MG: 2 INJECTION, POWDER, FOR SOLUTION INTRAMUSCULAR; INTRAVENOUS at 09:31

## 2025-07-26 RX ADMIN — NADOLOL 20 MG: 20 TABLET ORAL at 09:05

## 2025-07-26 RX ADMIN — SODIUM CHLORIDE, PRESERVATIVE FREE 10 ML: 5 INJECTION INTRAVENOUS at 09:05

## 2025-07-26 RX ADMIN — PANTOPRAZOLE SODIUM 40 MG: 40 TABLET, DELAYED RELEASE ORAL at 05:57

## 2025-07-26 NOTE — PLAN OF CARE
Problem: Discharge Planning  Goal: Discharge to home or other facility with appropriate resources  7/26/2025 0448 by Deidra Morales RN  Outcome: Progressing     Problem: Safety - Adult  Goal: Free from fall injury  7/26/2025 0448 by Deidra Morales RN  Outcome: Progressing     Problem: Pain  Goal: Verbalizes/displays adequate comfort level or baseline comfort level  7/26/2025 0448 by Deidra Morales RN  Outcome: Progressing     Problem: Gastrointestinal - Adult  Goal: Minimal or absence of nausea and vomiting  7/26/2025 0448 by Deidra Morales RN  Outcome: Progressing     Problem: Gastrointestinal - Adult  Goal: Maintains or returns to baseline bowel function  7/26/2025 0448 by Deidra Morales RN  Outcome: Progressing     Problem: Gastrointestinal - Adult  Goal: Maintains adequate nutritional intake  7/26/2025 0448 by Deidra Morales RN  Outcome: Progressing     Problem: Nutrition Deficit:  Goal: Optimize nutritional status  7/26/2025 0448 by Deidra Morales RN  Outcome: Progressing     Problem: ABCDS Injury Assessment  Goal: Absence of physical injury  7/26/2025 0448 by Deidra Morales RN  Outcome: Progressing

## 2025-07-26 NOTE — PLAN OF CARE
Problem: Discharge Planning  Goal: Discharge to home or other facility with appropriate resources  7/26/2025 1532 by Camille Adams RN  Outcome: Completed  7/26/2025 0448 by Deidra Morales RN  Outcome: Progressing     Problem: Safety - Adult  Goal: Free from fall injury  7/26/2025 1532 by Camille Adams RN  Outcome: Completed  7/26/2025 0448 by Deidra Morales RN  Outcome: Progressing     Problem: Pain  Goal: Verbalizes/displays adequate comfort level or baseline comfort level  7/26/2025 1532 by Camille Adams RN  Outcome: Completed  7/26/2025 0448 by Deidra Morales RN  Outcome: Progressing     Problem: Gastrointestinal - Adult  Goal: Minimal or absence of nausea and vomiting  7/26/2025 1532 by Camille Adams RN  Outcome: Completed  7/26/2025 0448 by Deidra Morales RN  Outcome: Progressing  Goal: Maintains or returns to baseline bowel function  7/26/2025 1532 by Camille Adams RN  Outcome: Completed  7/26/2025 0448 by Deidra Morales RN  Outcome: Progressing  Goal: Maintains adequate nutritional intake  7/26/2025 1532 by Camille Adams RN  Outcome: Completed  7/26/2025 0448 by Deidra Morales RN  Outcome: Progressing     Problem: Nutrition Deficit:  Goal: Optimize nutritional status  7/26/2025 1532 by Camille Adams RN  Outcome: Completed  7/26/2025 0448 by Deidra Morales RN  Outcome: Progressing     Problem: ABCDS Injury Assessment  Goal: Absence of physical injury  7/26/2025 1532 by Camille Adasm RN  Outcome: Completed  7/26/2025 0448 by Deidra Morales RN  Outcome: Progressing

## 2025-07-26 NOTE — PROGRESS NOTES
Patient discharged to home per orders.  IV x 3 discontinued intact. Discharge instructions reviewed written and verbal.  Patient states all belongings are present.  Taken per wheelchair to awaiting cab.

## 2025-07-26 NOTE — CARE COORDINATION
Case Management   Daily Progress Note       Patient Name: Frank Lisa                   YOB: 1960  Diagnosis: GI bleed [K92.2]  Elevated LFTs [R79.89]  Hematemesis with nausea [K92.0]                       GMLOS: 4.9 days  Length of Stay: 3  days    Readmission Risk (Low < 19, Mod (19-27), High > 27): Readmission Risk Score: 26.5      Patient is alert and oriented.    Spoke with Patient, and Current Transitional Plan is:    [] Home Independently    [x] Home with HC    [] Skilled Nursing Facility    [] Acute Rehabilitation    [] Long Term Acute Care (LTAC)    [] Other:     Medical Management: Patient will discharge to home today with Go Dillard     Testing Ordered:     Additional Notes:     Electronically signed by Mary Ellen Resendez on 7/26/2025 at 3:35 PM

## 2025-07-26 NOTE — PROGRESS NOTES
Date:   7/26/2025  Patient name: Frank Lisa  Date of admission:  7/23/2025  5:22 AM  MRN:   [id number]  YOB: 1960  PCP: Lopez Rosario PA    Reason for Admission: GI bleed [K92.2]  Elevated LFTs [R79.89]  Hematemesis with nausea [K92.0]    Cardiology follow-up: A-fib with RVR       Referring physician: Dr. Kathi Russo     Impression     Admitted 7/23/2025 with hematemesis, abdominal pain, diarrhea, newly diagnosed esophageal cancer  EGD 7/23/2025 esophageal varices, 1 varices with minor bruising of blood, 3 bands successfully placed, deflation of varices noted completely/procedure done by Dr. Joseluis Olivia.  On admission rhythm was sinus rhythm converted to A-fib with RVR around 9 AM  Severe hypomagnesemia on admission most likely secondary to diarrhea  Previous history of A-fib short lasting couple of years  History of palpitation on exertion  No history of coronary intervention  Normal LV systolic function, ejection fraction 60 to 65%, mild to moderate LVH, 2D echo 7/22/2024  History of hepatitis C  Liver cirrhosis, alcohol abuse, ascites multiple paracentesis, last paracentesis was 2 weeks ago  Severe portal hypertension, status post TIPS(transjugular intrahepatic portosystemic shunt)  Anemia thrombocytopenia  Protein malnutrition, albumin 2.1  History of bowel perforation requiring right hemicolectomy with end ileostomy reversed on 10/10/2023  History of TIA x 3 presented with a speech problem, drooling, facial muscle weakness     Quit smoking 2016     History of present illness  64-year-old male who lives alone with his dog, father of 2 kids 1 son and 1 daughter got hospitalized on 7/23/2025 with abdominal pain, diarrhea, hematemesis.  Around midnight he had hematemesis preceded by significant abdominal pain and he was also having diarrhea.  He called EMS.     He has past medical history of his of his cell carcinoma, treated with chemoradiation, liver cirrhosis, hepatitis C, history of heavy

## 2025-07-26 NOTE — PROGRESS NOTES
[facility]    Gastroenterology Daily Progress Note      Patient:   Frank Lisa   :    1960   Facility:   [facility]  Date:     2025  Consultant:   MASSIMO Coello - CNP, CNP      SUBJECTIVE  64 y.o. male admitted 2025 with GI bleed [K92.2]  Elevated LFTs [R79.89]  Hematemesis with nausea [K92.0] and seen for cirrhosis and hematemesis. The pt was seen and examined. Remains oriented, no .c/o  nausea.  But does have epigastric pain after eating food. Had non bloody stool yesterday. Hgb 8.1        OBJECTIVE  Scheduled Meds:   pantoprazole  40 mg Oral BID AC    furosemide  20 mg Oral Daily    spironolactone  50 mg Oral Daily    insulin lispro  0-8 Units SubCUTAneous 4x Daily AC & HS    dilTIAZem  120 mg Oral Daily    gabapentin  100 mg Oral BID    lactulose  30 g Oral 4x daily    nadolol  20 mg Oral BID    sucralfate  1 g Oral 4x Daily AC & HS    sodium chloride flush  5-40 mL IntraVENous 2 times per day    cefTRIAXone (ROCEPHIN) IV  1,000 mg IntraVENous Q24H       Vital Signs:  /72   Pulse 69   Temp 98.2 °F (36.8 °C) (Oral)   Resp 16   Ht 1.778 m (5' 10\")   Wt 95.3 kg (210 lb)   SpO2 99%   BMI 30.13 kg/m²    Review of Systems - History obtained from chart review and the patient  General ROS: negative  Respiratory ROS: no cough, shortness of breath, or wheezing  Cardiovascular ROS: no chest pain or dyspnea on exertion  Gastrointestinal ROS: positive for - abdominal pain   Physical Exam:     General Appearance: alert and oriented to person, place and time, well-developed and well-nourished, in no acute distress  Skin: warm and dry, no rash or erythema  Head: normocephalic and atraumatic  Eyes: pupils equal, round, and reactive to light, extraocular eye movements intact, conjunctivae normal  ENT: hearing grossly normal bilaterally  Neck: neck supple and non tender without mass, no thyromegaly or thyroid nodules, no cervical lymphadenopathy

## 2025-07-26 NOTE — PROGRESS NOTES
Right 11/23/2020    NERVE BLOCK RIGHT CERVICAL STEROID INJECTION  C3-C6 performed by Dheeraj Lau MD at [facility] OR    OTHER SURGICAL HISTORY  01/04/2016    steroid injection C7 T1    OTHER SURGICAL HISTORY  11/21/2016    Bilateral Lumbar CACHORRO L4-L5 injections    OTHER SURGICAL HISTORY  12/19/2016    lumbar steroid injection    OTHER SURGICAL HISTORY  09/28/2018    BILATERAL L5 CACHORRO (N/A Back)    OTHER SURGICAL HISTORY Right 11/23/2020    cervical injection    PAIN MANAGEMENT PROCEDURE Left 07/09/2020    EPIDURAL STEROID INJECTION LEFT L4 L5 performed by Dheeraj Lau MD at [facility] OR    PAIN MANAGEMENT PROCEDURE Left 07/20/2020    LEFT L4 L5 EPIDURAL STEROID INJECTION performed by Dheeraj Lau MD at [facility] OR    PAIN MANAGEMENT PROCEDURE Bilateral 08/17/2020    LUMBAR FACET BILATERAL L2-L5 performed by Dheeraj Lau MD at [facility] OR    PAIN MANAGEMENT PROCEDURE Bilateral 12/07/2020    NERVE BLOCK BILATERAL LUMBAR MEDIAL BRANCH L2-L5 performed by Dheeraj Lau MD at [facility] OR    PARACENTESIS      Multiple    ID NEUROPLASTY &/TRANSPOS MEDIAN NRV CARPAL TUNNE Right 08/29/2017    CARPAL TUNNEL RELEASE RIGHT performed by Curtis Villalobos MD at [facility] OR    ID NEUROPLASTY &/TRANSPOS MEDIAN NRV CARPAL TUNNE Left 10/31/2017    CARPAL TUNNEL RELEASE performed by Curtis Villalobos MD at [facility] OR    ID NJX AA&/STRD TFRML EPI LUMBAR/SACRAL 1 LEVEL Bilateral 09/06/2018    BILATERAL L5 CACHORRO performed by Dheeraj Lau MD at [facility] OR    ID NJX AA&/STRD TFRML EPI LUMBAR/SACRAL 1 LEVEL N/A 09/28/2018    BILATERAL L5 CACHORRO performed by Dheeraj Lau MD at [facility] OR    SMALL INTESTINE SURGERY N/A 10/10/2023    EXPLORATORY LAPAROTOMY, REVERSAL ILEOSTOMY WITH ILEOCOLOSTOMY, LYSIS OF ADHESIONS, performed by Kvng Waddell DO at [facility] OR    SPINE SURGERY N/A 07/24/2024    KYPHOPLASTY T3 performed by Júnior Cueva MD at [facility] OR    SPINE SURGERY N/A 10/02/2024    KYPHOPLASTY T12 performed by Júnior Cueva MD at [facility] OR    SPINE SURGERY N/A 10/23/2024        Imaging/Diagnostics:        Assessment :      Primary Problem  GI bleed    Active Hospital Problems    Diagnosis Date Noted    GI bleed [K92.2] 09/13/2022     Priority: Medium    Hematemesis with nausea [K92.0] 07/23/2025    History of esophageal cancer [Z85.01] 07/23/2025    Colon adenocarcinoma (HCC) [C18.9] 06/02/2025    Refractory ascites [R18.8]        Plan:     Patient status Admit as inpatient in the  Progressive Unit/Step down  Patient is 64-year-old male past medical history of has hepatitis C, history of alcoholic liver cirrhosis decompensated with portal hypertension status post TIPS presented with 2 episodes of hematemesis,  Concern for variceal bleeding,  , hemoglobin is currently stable, started on Protonix drip and octreotide drip, GI consulted monitor H&H,    Acute anemia of blood loss secondary variceal bleeding patient had EGD yesterday, found to have varices with oozing of the blood status post banding, and salmon-colored mucosa,  As per GI continue Protonix drip octreotide drip and nadolol patient to have repeat EGD in 4 to 6 weeks currently on clear liquid diet advance as per GI    A-fib with RVR, not a candidate for anticoagulation due recurrent variceal bleed   Started on Cardizem drip yesterday, currently on 5 mg of Cardizem, heart rate in 70s and 80s, will start nadolol, wean down Cardizem, cardiology consulted    Decompensated liver cirrhosis  with variceal bleeding and recurrent ascites,  Patient to have paracentesis,  Rocephin for SBP prophylaxis    History of esophageal cancer in remission    Patient was hyperglycemic this morning check HbA1c insulin sliding scale    Recent diagnosis of adenocarcinoma of colon, follows up with oncology and colorectal surgeon outpatient    Lung nodule, patient to have repeat CT and outpatient evaluation    DVT prophylaxis SCD    7/26  Patient is seen and examined at bedside.  Afebrile, vital signs are stable.  Hemoglobin is stable.  Protonix and

## 2025-07-27 LAB
MICROORGANISM SPEC CULT: NORMAL
MICROORGANISM/AGENT SPEC: NORMAL
SPECIMEN DESCRIPTION: NORMAL

## 2025-07-28 ENCOUNTER — TELEPHONE (OUTPATIENT)
Dept: INTERNAL MEDICINE CLINIC | Age: 65
End: 2025-07-28

## 2025-07-28 LAB — SURGICAL PATHOLOGY REPORT: NORMAL

## 2025-07-28 NOTE — TELEPHONE ENCOUNTER
[facility] contacted office to clarify dose 200mg on the Xifaxan , Rx written in hospital stay by Dr Hernandez.Informing that 200mg is on back order ,gave info to contact patients PCP Dr Rosario     Safety plan discussed with...

## 2025-07-28 NOTE — PROGRESS NOTES
Physician Progress Note      PATIENT:               ALHAJI COOLEY  CSN #:                  [id number]  :                       1960  ADMIT DATE:       2025 5:22 AM  DISCH DATE:        2025 6:32 PM  RESPONDING  PROVIDER #:        Toño Hernandez MD          QUERY TEXT:    Malnutrition is documented in the Cardiology Progress Notes by Stella Youngblood MD at 2025.  Please specify the degree/type:    The clinical indicators include:  - 64-year-old male, cancer, colostomy, alcoholic cirrhosis, esophageal   varices, acute posthemorrhagic anemia    -\"Protein malnutrition, albumin 2.1\" (Cardiology Progress Notes by Stella Youngblood MD at 2025)    Malnutrition Assessment:  Malnutrition Status:  At risk for malnutrition (25 1246)  Context:  Chronic Illness  Findings of the 6 clinical characteristics of malnutrition:  Energy Intake:  Mild decrease in energy intake  Weight Loss:  No weight loss  Body Fat Loss:  No body fat loss  Muscle Mass Loss:  Mild muscle mass loss  Fluid Accumulation:  Severe Ascites   Strength:  Not Performed (Progress Notes by Angie Enamorado, RD, LD at   2025)    - On ; Albumin- 2.7; total protein- 5.9  - On ; Albumin- 2.1; total protein- 4.5      - Current BMI (kg/m2): 32.8    - Continue Current Diet, recommend 2g Na, Low Fiber diet with 5 carbohydrate   choices per tray when advanced to solid food (Progress Notes by Angie Enamorado, RD, LD at 2025), monitor lab values.    Thank you,  Boubacar Merino, JOYCE, [facility]  Options provided:  -- Mild Protein calorie malnutrition  -- Moderate Protein calorie malnutrition  -- Other - I will add my own diagnosis  -- Disagree - Not applicable / Not valid  -- Disagree - Clinically unable to determine / Unknown  -- Refer to Clinical Documentation Reviewer    PROVIDER RESPONSE TEXT:    This patient has mild protein calorie malnutrition.    Query created by: Boubacar Merino on 2025 5:58

## 2025-07-29 ENCOUNTER — TELEPHONE (OUTPATIENT)
Dept: PRIMARY CARE CLINIC | Age: 65
End: 2025-07-29

## 2025-07-29 DIAGNOSIS — C18.7 ADENOCARCINOMA OF SIGMOID COLON (HCC): ICD-10-CM

## 2025-07-29 RX ORDER — FOLIC ACID 1 MG/1
1000 TABLET ORAL DAILY
Qty: 30 TABLET | Refills: 0 | Status: SHIPPED | OUTPATIENT
Start: 2025-07-29

## 2025-07-29 RX ORDER — POLYETHYLENE GLYCOL-3350 AND ELECTROLYTES 236; 6.74; 5.86; 2.97; 22.74 G/274.31G; G/274.31G; G/274.31G; G/274.31G; G/274.31G
POWDER, FOR SOLUTION ORAL
Refills: 0 | OUTPATIENT
Start: 2025-07-29

## 2025-07-29 NOTE — TELEPHONE ENCOUNTER
Care Transitions Initial Follow Up Call    Outreach made within 2 business days of discharge: Yes    Patient: Frank Lisa Patient : 1960   MRN: [id number]  Reason for Admission: Hyperemesis   Discharge Date: 25       Spoke with: Nilam    Discharge department/facility: [facility] Interactive Patient Contact:  Was patient able to fill all prescriptions: Yes  Was patient instructed to bring all medications to the follow-up visit: Yes  Is patient taking all medications as directed in the discharge summary? Yes  Does patient understand their discharge instructions: Yes  Does patient have questions or concerns that need addressed prior to 7-14 day follow up office visit: no    Additional needs identified to be addressed with provider  No needs identified             Scheduled appointment with PCP within 7-14 days    Follow Up  Future Appointments   Date Time Provider Department Center   2025  2:30 PM Lopez Rosario PA [facility] DEP   2025  2:15 PM Dixon Olivia MD [facility] [facility]   2025  8:30 AM Jennifer Richards MD [facility]   2025  1:30 PM Júnior Cueva MD [facility]   10/30/2025  2:45 PM Dixon Olivia MD [facility]   2026 11:45 AM Júnior Tripp MD [facility]       Alba Aguilar MA

## 2025-07-29 NOTE — TELEPHONE ENCOUNTER
Patient already scheuled for a hospital f/u tomorrow 7/30 with Lopez. Patient called and wife Nilam answered who is on HIPAA & TCM done.

## 2025-07-30 ENCOUNTER — TELEPHONE (OUTPATIENT)
Dept: PRIMARY CARE CLINIC | Age: 65
End: 2025-07-30

## 2025-07-30 ENCOUNTER — TELEPHONE (OUTPATIENT)
Age: 65
End: 2025-07-30

## 2025-07-30 ENCOUNTER — OFFICE VISIT (OUTPATIENT)
Dept: PRIMARY CARE CLINIC | Age: 65
End: 2025-07-30

## 2025-07-30 VITALS
BODY MASS INDEX: 29.46 KG/M2 | WEIGHT: 205.8 LBS | DIASTOLIC BLOOD PRESSURE: 68 MMHG | HEART RATE: 62 BPM | HEIGHT: 70 IN | SYSTOLIC BLOOD PRESSURE: 120 MMHG | OXYGEN SATURATION: 98 %

## 2025-07-30 DIAGNOSIS — I85.10 SECONDARY ESOPHAGEAL VARICES WITHOUT BLEEDING (HCC): Primary | ICD-10-CM

## 2025-07-30 DIAGNOSIS — C18.9 COLON ADENOCARCINOMA (HCC): ICD-10-CM

## 2025-07-30 DIAGNOSIS — K74.60 DECOMPENSATION OF CIRRHOSIS OF LIVER (HCC): ICD-10-CM

## 2025-07-30 DIAGNOSIS — B18.1 CHRONIC VIRAL HEPATITIS B WITHOUT DELTA AGENT AND WITHOUT COMA (HCC): ICD-10-CM

## 2025-07-30 DIAGNOSIS — Z09 HOSPITAL DISCHARGE FOLLOW-UP: ICD-10-CM

## 2025-07-30 DIAGNOSIS — K70.31 ASCITES DUE TO ALCOHOLIC CIRRHOSIS (HCC): ICD-10-CM

## 2025-07-30 DIAGNOSIS — B18.2 CHRONIC HEPATITIS C WITHOUT HEPATIC COMA (HCC): ICD-10-CM

## 2025-07-30 DIAGNOSIS — F10.931 ALCOHOL WITHDRAWAL SYNDROME, WITH DELIRIUM (HCC): ICD-10-CM

## 2025-07-30 DIAGNOSIS — F10.139 ALCOHOL ABUSE WITH WITHDRAWAL (HCC): ICD-10-CM

## 2025-07-30 DIAGNOSIS — I10 ESSENTIAL HYPERTENSION: ICD-10-CM

## 2025-07-30 DIAGNOSIS — K72.90 DECOMPENSATION OF CIRRHOSIS OF LIVER (HCC): ICD-10-CM

## 2025-07-30 LAB
MICROORGANISM SPEC CULT: NORMAL
MICROORGANISM/AGENT SPEC: NORMAL
SPECIMEN DESCRIPTION: NORMAL

## 2025-07-30 ASSESSMENT — PATIENT HEALTH QUESTIONNAIRE - PHQ9
7. TROUBLE CONCENTRATING ON THINGS, SUCH AS READING THE NEWSPAPER OR WATCHING TELEVISION: NOT AT ALL
SUM OF ALL RESPONSES TO PHQ QUESTIONS 1-9: 10
4. FEELING TIRED OR HAVING LITTLE ENERGY: SEVERAL DAYS
2. FEELING DOWN, DEPRESSED OR HOPELESS: NEARLY EVERY DAY
3. TROUBLE FALLING OR STAYING ASLEEP: NOT AT ALL
SUM OF ALL RESPONSES TO PHQ QUESTIONS 1-9: 10
6. FEELING BAD ABOUT YOURSELF - OR THAT YOU ARE A FAILURE OR HAVE LET YOURSELF OR YOUR FAMILY DOWN: NOT AT ALL
SUM OF ALL RESPONSES TO PHQ QUESTIONS 1-9: 10
10. IF YOU CHECKED OFF ANY PROBLEMS, HOW DIFFICULT HAVE THESE PROBLEMS MADE IT FOR YOU TO DO YOUR WORK, TAKE CARE OF THINGS AT HOME, OR GET ALONG WITH OTHER PEOPLE: NOT DIFFICULT AT ALL
1. LITTLE INTEREST OR PLEASURE IN DOING THINGS: MORE THAN HALF THE DAYS
SUM OF ALL RESPONSES TO PHQ QUESTIONS 1-9: 10
5. POOR APPETITE OR OVEREATING: NEARLY EVERY DAY
8. MOVING OR SPEAKING SO SLOWLY THAT OTHER PEOPLE COULD HAVE NOTICED. OR THE OPPOSITE, BEING SO FIGETY OR RESTLESS THAT YOU HAVE BEEN MOVING AROUND A LOT MORE THAN USUAL: SEVERAL DAYS
9. THOUGHTS THAT YOU WOULD BE BETTER OFF DEAD, OR OF HURTING YOURSELF: NOT AT ALL

## 2025-07-30 NOTE — PROGRESS NOTES
Post-Discharge Transitional Care  Follow Up      Frank Lisa   YOB: 1960    Date of Office Visit:  7/30/2025  Date of Hospital Admission: 7/23/25  Date of Hospital Discharge: 7/26/25  Risk of hospital readmission (high >=14%. Medium >=10%) :Readmission Risk Score: 26.5      Care management risk score Rising risk (score 2-5) and Complex Care (Scores >=6): No Risk Score On File     Non face to face  following discharge, date last encounter closed (first attempt may have been earlier): 07/29/2025    Call initiated 2 business days of discharge: Yes    ASSESSMENT/PLAN:   Assessment & Plan       Medical Decision Making: high complexity  No follow-ups on file.           Subjective:   Inpatient course: Discharge summary unable to be reviewed because it was not completed in a timely fashion.    History of Present Illness  The patient is a pleasant 64-year-old male who presents today for a hospital follow-up. There is no discharge summary to review as the hospitalist did not complete this in a timely fashion.    He experienced a gastrointestinal bleed last week, which was less severe than previous episodes. Treatment included banding, and he called 911. He has a history of esophageal cancer, which is currently in remission. He has varicose veins that are prone to bleeding.    He underwent a paracentesis procedure at the hospital, despite an ultrasound previously indicating it was unnecessary. His abdomen was notably distended at the time. He reports no increase in abdominal size since the paracentesis. He had a TIPS procedure performed years ago, which initially resolved his ascites, but the condition has since recurred.    He is scheduled for a Watchman procedure for his heart and will be placed on blood thinners post-procedure. He has atrial fibrillation. He is also due for surgery to remove a colon mass, but this will be postponed until after his cardiac issues are addressed.    Alcohol: He has been

## 2025-07-30 NOTE — TELEPHONE ENCOUNTER
Diana from [facility] called in stating she did a start of care on patient yesterday and he will be receiving skilled nursing X2 weekly for 2 weeks and then X1 weekly for 7 weeks. Stated this is subject to change due to the amount of upcoming procedures patient has.

## 2025-07-30 NOTE — TELEPHONE ENCOUNTER
Name: Frank Lisa  : 1960  MRN: [id number]    Oncology Navigation Follow-Up Note    Contact Type:  Telephone    Notes: Writer called pts wife Pierre to touch base regarding future plan for surgery vs tx. No answer, VM left requesting a return call.       Electronically signed by Caitie Arrington RN on 2025 at 8:46 AM

## 2025-07-30 NOTE — TELEPHONE ENCOUNTER
Name: Frank Lisa  : 1960  MRN: [id number]    Oncology Navigation Follow-Up Note    Contact Type:  Telephone    Notes: Writer received a return call from [facility]. She states pt has had a lot going on recently. She states that he had bleeding varices with bloody emesis and was hospitilized recently. She states he's seeing cardiology on  to discuss having a watchman placed And he sees GI this week. Writer has message out to Dr. Coleman's nurse Jackie asking for an update on plan for surgery. Writer will route this note to Jackie to update on above.       Electronically signed by Caitie Arrington RN on 2025 at 9:41 AM

## 2025-08-01 DIAGNOSIS — C18.7 ADENOCARCINOMA OF SIGMOID COLON (HCC): Primary | ICD-10-CM

## 2025-08-12 RX ORDER — DULOXETIN HYDROCHLORIDE 30 MG/1
30 CAPSULE, DELAYED RELEASE ORAL DAILY
Qty: 30 CAPSULE | Refills: 3 | Status: SHIPPED | OUTPATIENT
Start: 2025-08-12 | End: 2025-08-12 | Stop reason: SINTOL

## 2025-08-13 ENCOUNTER — TELEPHONE (OUTPATIENT)
Dept: PRIMARY CARE CLINIC | Age: 65
End: 2025-08-13

## 2025-08-14 ENCOUNTER — TELEPHONE (OUTPATIENT)
Dept: GASTROENTEROLOGY | Age: 65
End: 2025-08-14

## 2025-08-14 ENCOUNTER — OFFICE VISIT (OUTPATIENT)
Dept: GASTROENTEROLOGY | Age: 65
End: 2025-08-14
Payer: COMMERCIAL

## 2025-08-14 VITALS
DIASTOLIC BLOOD PRESSURE: 73 MMHG | BODY MASS INDEX: 27.77 KG/M2 | HEART RATE: 59 BPM | TEMPERATURE: 97.3 F | HEIGHT: 70 IN | RESPIRATION RATE: 20 BRPM | SYSTOLIC BLOOD PRESSURE: 135 MMHG | WEIGHT: 194 LBS

## 2025-08-14 DIAGNOSIS — K76.82 HEPATIC ENCEPHALOPATHY (HCC): ICD-10-CM

## 2025-08-14 DIAGNOSIS — I85.10 SECONDARY ESOPHAGEAL VARICES WITHOUT BLEEDING (HCC): ICD-10-CM

## 2025-08-14 DIAGNOSIS — K22.711 BARRETT'S ESOPHAGUS WITH HIGH GRADE DYSPLASIA: ICD-10-CM

## 2025-08-14 DIAGNOSIS — L29.9 GENERALIZED PRURITUS: ICD-10-CM

## 2025-08-14 DIAGNOSIS — K70.31 ASCITES DUE TO ALCOHOLIC CIRRHOSIS (HCC): Primary | ICD-10-CM

## 2025-08-14 DIAGNOSIS — Z95.828 S/P TIPS (TRANSJUGULAR INTRAHEPATIC PORTOSYSTEMIC SHUNT): ICD-10-CM

## 2025-08-14 PROCEDURE — 1123F ACP DISCUSS/DSCN MKR DOCD: CPT | Performed by: INTERNAL MEDICINE

## 2025-08-14 PROCEDURE — 3075F SYST BP GE 130 - 139MM HG: CPT | Performed by: INTERNAL MEDICINE

## 2025-08-14 PROCEDURE — 99214 OFFICE O/P EST MOD 30 MIN: CPT | Performed by: INTERNAL MEDICINE

## 2025-08-14 PROCEDURE — G2211 COMPLEX E/M VISIT ADD ON: HCPCS | Performed by: INTERNAL MEDICINE

## 2025-08-14 PROCEDURE — 3078F DIAST BP <80 MM HG: CPT | Performed by: INTERNAL MEDICINE

## 2025-08-14 RX ORDER — LACTULOSE 10 G/15ML
30 SOLUTION ORAL 4 TIMES DAILY
Qty: 5400 ML | Refills: 1 | Status: SHIPPED | OUTPATIENT
Start: 2025-08-14

## 2025-08-14 RX ORDER — PANTOPRAZOLE SODIUM 40 MG/1
40 TABLET, DELAYED RELEASE ORAL
Qty: 30 TABLET | Refills: 3 | Status: SHIPPED | OUTPATIENT
Start: 2025-08-14

## 2025-08-14 RX ORDER — FUROSEMIDE 20 MG/1
20 TABLET ORAL DAILY
Qty: 30 TABLET | Refills: 1 | Status: SHIPPED | OUTPATIENT
Start: 2025-08-14

## 2025-08-14 RX ORDER — NADOLOL 20 MG/1
20 TABLET ORAL 2 TIMES DAILY
Qty: 30 TABLET | Refills: 3 | Status: SHIPPED | OUTPATIENT
Start: 2025-08-14

## 2025-08-14 RX ORDER — GABAPENTIN 100 MG/1
200 CAPSULE ORAL 2 TIMES DAILY
Qty: 180 CAPSULE | Refills: 1 | Status: SHIPPED | OUTPATIENT
Start: 2025-08-14 | End: 2026-02-10

## 2025-08-14 RX ORDER — SPIRONOLACTONE 50 MG/1
50 TABLET, FILM COATED ORAL DAILY
Qty: 30 TABLET | Refills: 1 | Status: SHIPPED | OUTPATIENT
Start: 2025-08-14

## 2025-08-14 ASSESSMENT — ENCOUNTER SYMPTOMS
ABDOMINAL DISTENTION: 0
CHOKING: 0
ANAL BLEEDING: 0
BACK PAIN: 1
VOMITING: 0
DIARRHEA: 1
WHEEZING: 0
SORE THROAT: 0
BLOOD IN STOOL: 0
VOICE CHANGE: 0
NAUSEA: 0
TROUBLE SWALLOWING: 0
COUGH: 0
COLOR CHANGE: 0
RECTAL PAIN: 0
CONSTIPATION: 0
ABDOMINAL PAIN: 1

## 2025-08-15 ENCOUNTER — HOSPITAL ENCOUNTER (OUTPATIENT)
Dept: LAB | Age: 65
Discharge: HOME | End: 2025-08-15
Payer: COMMERCIAL

## 2025-08-15 ENCOUNTER — TELEPHONE (OUTPATIENT)
Dept: PRIMARY CARE CLINIC | Age: 65
End: 2025-08-15

## 2025-08-15 DIAGNOSIS — M54.9 BACK PAIN, UNSPECIFIED BACK LOCATION, UNSPECIFIED BACK PAIN LATERALITY, UNSPECIFIED CHRONICITY: ICD-10-CM

## 2025-08-15 DIAGNOSIS — K70.31 ASCITES DUE TO ALCOHOLIC CIRRHOSIS (HCC): ICD-10-CM

## 2025-08-15 DIAGNOSIS — M51.369 DEGENERATION OF INTERVERTEBRAL DISC OF LUMBAR REGION, UNSPECIFIED WHETHER PAIN PRESENT: Primary | ICD-10-CM

## 2025-08-15 LAB
ALBUMIN SERPL-MCNC: 2.9 G/DL (ref 3.5–5.2)
ALP SERPL-CCNC: 110 U/L (ref 40–129)
ALT SERPL-CCNC: 14 U/L (ref 10–50)
ANION GAP SERPL CALCULATED.3IONS-SCNC: 12 MMOL/L (ref 9–16)
AST SERPL-CCNC: 38 U/L (ref 10–50)
BILIRUB DIRECT SERPL-MCNC: 0.6 MG/DL (ref 0–0.3)
BILIRUB INDIRECT SERPL-MCNC: 0.4 MG/DL (ref 0–1)
BILIRUB SERPL-MCNC: 1 MG/DL (ref 0–1.2)
BUN SERPL-MCNC: 19 MG/DL (ref 8–23)
CALCIUM SERPL-MCNC: 8.9 MG/DL (ref 8.6–10.4)
CHLORIDE SERPL-SCNC: 103 MMOL/L (ref 98–107)
CO2 SERPL-SCNC: 19 MMOL/L (ref 20–31)
CREAT SERPL-MCNC: 1.4 MG/DL (ref 0.7–1.2)
ERYTHROCYTE [DISTWIDTH] IN BLOOD BY AUTOMATED COUNT: 18.4 % (ref 11.5–14.9)
GFR, ESTIMATED: 55 ML/MIN/1.73M2
GLUCOSE SERPL-MCNC: 105 MG/DL (ref 74–99)
HCT VFR BLD AUTO: 27.4 % (ref 41–53)
HGB BLD-MCNC: 8.2 G/DL (ref 13.5–17.5)
INR PPP: 1.2
MCH RBC QN AUTO: 24.6 PG (ref 26–34)
MCHC RBC AUTO-ENTMCNC: 29.9 G/DL (ref 31–37)
MCV RBC AUTO: 82 FL (ref 80–100)
NRBC BLD-RTO: 0 PER 100 WBC
PLATELET # BLD AUTO: 206 K/UL (ref 150–450)
PMV BLD AUTO: 10 FL (ref 8–13.5)
POTASSIUM SERPL-SCNC: 4.9 MMOL/L (ref 3.7–5.3)
PROT SERPL-MCNC: 6.1 G/DL (ref 6.6–8.7)
PROTHROMBIN TIME: 16.6 SEC (ref 11.8–14.6)
RBC # BLD AUTO: 3.34 M/UL (ref 4.21–5.77)
SODIUM SERPL-SCNC: 134 MMOL/L (ref 136–145)
WBC OTHER # BLD: 6.9 K/UL (ref 3.5–11)

## 2025-08-15 PROCEDURE — 85610 PROTHROMBIN TIME: CPT

## 2025-08-15 PROCEDURE — 85027 COMPLETE CBC AUTOMATED: CPT

## 2025-08-15 PROCEDURE — 80053 COMPREHEN METABOLIC PANEL: CPT

## 2025-08-15 PROCEDURE — 82248 BILIRUBIN DIRECT: CPT

## 2025-08-15 PROCEDURE — 36415 COLL VENOUS BLD VENIPUNCTURE: CPT

## 2025-08-16 ENCOUNTER — RESULTS FOLLOW-UP (OUTPATIENT)
Dept: GASTROENTEROLOGY | Age: 65
End: 2025-08-16

## 2025-08-16 DIAGNOSIS — R79.89 ELEVATED SERUM CREATININE: Primary | ICD-10-CM

## 2025-08-18 ENCOUNTER — HOSPITAL ENCOUNTER (OUTPATIENT)
Dept: PREADMISSION TESTING | Age: 65
Discharge: HOME | End: 2025-08-22

## 2025-08-18 VITALS — BODY MASS INDEX: 27.77 KG/M2 | HEIGHT: 70 IN | WEIGHT: 194 LBS

## 2025-08-20 ENCOUNTER — HOSPITAL ENCOUNTER (OUTPATIENT)
Dept: LAB | Age: 65
Discharge: HOME | End: 2025-08-20
Payer: COMMERCIAL

## 2025-08-20 ENCOUNTER — TELEPHONE (OUTPATIENT)
Dept: NEUROLOGY | Age: 65
End: 2025-08-20

## 2025-08-20 LAB
ALBUMIN SERPL-MCNC: 3 G/DL (ref 3.5–5.2)
ALP SERPL-CCNC: 109 U/L (ref 40–129)
ALT SERPL-CCNC: 15 U/L (ref 10–50)
ANION GAP SERPL CALCULATED.3IONS-SCNC: 10 MMOL/L (ref 9–16)
AST SERPL-CCNC: 34 U/L (ref 10–50)
BILIRUB DIRECT SERPL-MCNC: 0.5 MG/DL (ref 0–0.3)
BILIRUB INDIRECT SERPL-MCNC: 0.6 MG/DL (ref 0–1)
BILIRUB SERPL-MCNC: 1.1 MG/DL (ref 0–1.2)
BUN SERPL-MCNC: 17 MG/DL (ref 8–23)
CALCIUM SERPL-MCNC: 9.1 MG/DL (ref 8.6–10.4)
CHLORIDE SERPL-SCNC: 101 MMOL/L (ref 98–107)
CO2 SERPL-SCNC: 20 MMOL/L (ref 20–31)
CREAT SERPL-MCNC: 1.4 MG/DL (ref 0.7–1.2)
GFR, ESTIMATED: 55 ML/MIN/1.73M2
GLUCOSE SERPL-MCNC: 85 MG/DL (ref 74–99)
POTASSIUM SERPL-SCNC: 5.3 MMOL/L (ref 3.7–5.3)
PROT SERPL-MCNC: 6.3 G/DL (ref 6.6–8.7)
SODIUM SERPL-SCNC: 131 MMOL/L (ref 136–145)

## 2025-08-20 PROCEDURE — 82248 BILIRUBIN DIRECT: CPT

## 2025-08-20 PROCEDURE — 80053 COMPREHEN METABOLIC PANEL: CPT

## 2025-08-20 PROCEDURE — 36415 COLL VENOUS BLD VENIPUNCTURE: CPT

## 2025-08-21 ENCOUNTER — ANESTHESIA EVENT (OUTPATIENT)
Dept: ENDOSCOPY | Age: 65
End: 2025-08-21
Payer: COMMERCIAL

## 2025-08-22 ENCOUNTER — HOSPITAL ENCOUNTER (OUTPATIENT)
Age: 65
Setting detail: OUTPATIENT SURGERY
Discharge: HOME | End: 2025-08-22
Attending: INTERNAL MEDICINE | Admitting: INTERNAL MEDICINE
Payer: COMMERCIAL

## 2025-08-22 ENCOUNTER — ANESTHESIA (OUTPATIENT)
Dept: ENDOSCOPY | Age: 65
End: 2025-08-22
Payer: COMMERCIAL

## 2025-08-22 VITALS
BODY MASS INDEX: 27.77 KG/M2 | RESPIRATION RATE: 16 BRPM | SYSTOLIC BLOOD PRESSURE: 127 MMHG | HEIGHT: 70 IN | WEIGHT: 194 LBS | HEART RATE: 57 BPM | TEMPERATURE: 97.1 F | DIASTOLIC BLOOD PRESSURE: 58 MMHG | OXYGEN SATURATION: 99 %

## 2025-08-22 DIAGNOSIS — K70.31 ASCITES DUE TO ALCOHOLIC CIRRHOSIS (HCC): ICD-10-CM

## 2025-08-22 DIAGNOSIS — I85.10 SECONDARY ESOPHAGEAL VARICES WITHOUT BLEEDING (HCC): ICD-10-CM

## 2025-08-22 PROCEDURE — 43239 EGD BIOPSY SINGLE/MULTIPLE: CPT | Performed by: INTERNAL MEDICINE

## 2025-08-22 PROCEDURE — 7100000010 HC PHASE II RECOVERY - FIRST 15 MIN: Performed by: INTERNAL MEDICINE

## 2025-08-22 PROCEDURE — 2709999900 HC NON-CHARGEABLE SUPPLY: Performed by: INTERNAL MEDICINE

## 2025-08-22 PROCEDURE — 3700000001 HC ADD 15 MINUTES (ANESTHESIA): Performed by: INTERNAL MEDICINE

## 2025-08-22 PROCEDURE — 88305 TISSUE EXAM BY PATHOLOGIST: CPT

## 2025-08-22 PROCEDURE — 3700000000 HC ANESTHESIA ATTENDED CARE: Performed by: INTERNAL MEDICINE

## 2025-08-22 PROCEDURE — 7100000011 HC PHASE II RECOVERY - ADDTL 15 MIN: Performed by: INTERNAL MEDICINE

## 2025-08-22 PROCEDURE — 6360000002 HC RX W HCPCS: Performed by: NURSE ANESTHETIST, CERTIFIED REGISTERED

## 2025-08-22 PROCEDURE — 3609012400 HC EGD TRANSORAL BIOPSY SINGLE/MULTIPLE: Performed by: INTERNAL MEDICINE

## 2025-08-22 PROCEDURE — 2580000003 HC RX 258: Performed by: ANESTHESIOLOGY

## 2025-08-22 RX ORDER — SODIUM CHLORIDE, SODIUM LACTATE, POTASSIUM CHLORIDE, CALCIUM CHLORIDE 600; 310; 30; 20 MG/100ML; MG/100ML; MG/100ML; MG/100ML
INJECTION, SOLUTION INTRAVENOUS CONTINUOUS
Status: DISCONTINUED | OUTPATIENT
Start: 2025-08-22 | End: 2025-08-22 | Stop reason: HOSPADM

## 2025-08-22 RX ORDER — LIDOCAINE HYDROCHLORIDE 10 MG/ML
1 INJECTION, SOLUTION EPIDURAL; INFILTRATION; INTRACAUDAL; PERINEURAL
Status: DISCONTINUED | OUTPATIENT
Start: 2025-08-22 | End: 2025-08-22 | Stop reason: HOSPADM

## 2025-08-22 RX ORDER — PROPOFOL 10 MG/ML
INJECTION, EMULSION INTRAVENOUS
Status: DISCONTINUED | OUTPATIENT
Start: 2025-08-22 | End: 2025-08-22 | Stop reason: SDUPTHER

## 2025-08-22 RX ORDER — SODIUM CHLORIDE 0.9 % (FLUSH) 0.9 %
5-40 SYRINGE (ML) INJECTION EVERY 12 HOURS SCHEDULED
Status: DISCONTINUED | OUTPATIENT
Start: 2025-08-22 | End: 2025-08-22 | Stop reason: HOSPADM

## 2025-08-22 RX ORDER — SODIUM CHLORIDE 9 MG/ML
INJECTION, SOLUTION INTRAVENOUS PRN
Status: DISCONTINUED | OUTPATIENT
Start: 2025-08-22 | End: 2025-08-22 | Stop reason: HOSPADM

## 2025-08-22 RX ORDER — SODIUM CHLORIDE 0.9 % (FLUSH) 0.9 %
5-40 SYRINGE (ML) INJECTION PRN
Status: DISCONTINUED | OUTPATIENT
Start: 2025-08-22 | End: 2025-08-22 | Stop reason: HOSPADM

## 2025-08-22 RX ORDER — LIDOCAINE HYDROCHLORIDE 20 MG/ML
INJECTION, SOLUTION EPIDURAL; INFILTRATION; INTRACAUDAL; PERINEURAL
Status: DISCONTINUED | OUTPATIENT
Start: 2025-08-22 | End: 2025-08-22 | Stop reason: SDUPTHER

## 2025-08-22 RX ADMIN — SODIUM CHLORIDE, POTASSIUM CHLORIDE, SODIUM LACTATE AND CALCIUM CHLORIDE: 600; 310; 30; 20 INJECTION, SOLUTION INTRAVENOUS at 10:07

## 2025-08-22 RX ADMIN — PROPOFOL 350 MG: 10 INJECTION, EMULSION INTRAVENOUS at 11:05

## 2025-08-22 RX ADMIN — LIDOCAINE HYDROCHLORIDE 80 MG: 20 INJECTION, SOLUTION EPIDURAL; INFILTRATION; INTRACAUDAL; PERINEURAL at 11:05

## 2025-08-22 ASSESSMENT — PAIN SCALES - GENERAL: PAINLEVEL_OUTOF10: 0

## 2025-08-22 ASSESSMENT — PAIN - FUNCTIONAL ASSESSMENT
PAIN_FUNCTIONAL_ASSESSMENT: 0-10
PAIN_FUNCTIONAL_ASSESSMENT: ADULT NONVERBAL PAIN SCALE (NPVS)

## 2025-08-22 ASSESSMENT — ENCOUNTER SYMPTOMS: SHORTNESS OF BREATH: 1

## 2025-08-25 LAB — SURGICAL PATHOLOGY REPORT: NORMAL

## 2025-08-26 ENCOUNTER — TELEPHONE (OUTPATIENT)
Dept: GASTROENTEROLOGY | Age: 65
End: 2025-08-26

## 2025-08-29 RX ORDER — NADOLOL 20 MG/1
20 TABLET ORAL 2 TIMES DAILY
Qty: 30 TABLET | Refills: 3 | Status: SHIPPED | OUTPATIENT
Start: 2025-08-29

## 2025-09-03 DIAGNOSIS — D64.9 ANEMIA, UNSPECIFIED TYPE: ICD-10-CM

## 2025-09-03 DIAGNOSIS — E83.42 HYPOMAGNESEMIA: ICD-10-CM

## 2025-09-03 RX ORDER — MAGNESIUM OXIDE 400 MG/1
400 TABLET ORAL DAILY
Qty: 30 TABLET | Refills: 1 | Status: SHIPPED | OUTPATIENT
Start: 2025-09-03

## 2025-09-03 RX ORDER — FOLIC ACID 1 MG/1
1000 TABLET ORAL DAILY
Qty: 30 TABLET | Refills: 3 | Status: SHIPPED | OUTPATIENT
Start: 2025-09-03

## 2025-09-03 RX ORDER — FERROUS SULFATE 325(65) MG
1 TABLET ORAL 2 TIMES DAILY
Qty: 60 TABLET | Refills: 5 | Status: SHIPPED | OUTPATIENT
Start: 2025-09-03

## (undated) DEVICE — MEDI-VAC NON-CONDUCTIVE SUCTION TUBING: Brand: CARDINAL HEALTH

## (undated) DEVICE — MARKER,SKIN,WI/RULER AND LABELS: Brand: MEDLINE

## (undated) DEVICE — BAG C ARM 22 IN LEN 22 IN W LTX FREE ST SNAP

## (undated) DEVICE — SPONGE LAP W18XL18IN WHT COT 4 PLY FLD STRUNG RADPQ DISP ST 2 PER PACK

## (undated) DEVICE — COVER,MAYO STAND,STERILE: Brand: MEDLINE

## (undated) DEVICE — PAD,ABDOMINAL,5"X9",ST,LF,25/BX: Brand: MEDLINE INDUSTRIES, INC.

## (undated) DEVICE — SINGLE DOSE EPI TY

## (undated) DEVICE — GLOVE ORANGE PI 7   MSG9070

## (undated) DEVICE — TOWEL,OR,DSP,ST,BLUE,STD,6/PK,12PK/CS: Brand: MEDLINE

## (undated) DEVICE — FIAPC® PROBE W/ FILTER 2200 A OD 2.3MM/6.9FR; L 2.2M/7.2FT: Brand: ERBE

## (undated) DEVICE — PROTECTOR ULN NRV PUR FOAM HK LOOP STRP ANATOMICALLY

## (undated) DEVICE — GLOVE ORTHO 8   MSG9480

## (undated) DEVICE — SOLUTION IV 1000ML 0.9% SOD CHL PH 5 INJ USP VIAFLX PLAS

## (undated) DEVICE — GLOVE SURG SZ 8 L12IN FNGR THK79MIL GRN LTX FREE

## (undated) DEVICE — ENDO KIT W/SYRINGE: Brand: MEDLINE INDUSTRIES, INC.

## (undated) DEVICE — NEEDLE, QUINCKE, 18GX3.5": Brand: MEDLINE

## (undated) DEVICE — COVER,TABLE,60X90,STERILE: Brand: MEDLINE

## (undated) DEVICE — SYRINGE MED 10ML LUERLOCK TIP W/O SFTY DISP

## (undated) DEVICE — DEFENDO AIR WATER SUCTION AND BIOPSY VALVE KIT FOR  OLYMPUS: Brand: DEFENDO AIR/WATER/SUCTION AND BIOPSY VALVE

## (undated) DEVICE — BLADE,CARBON-STEEL,15,STRL,DISPOSABLE,TB: Brand: MEDLINE

## (undated) DEVICE — ST CHARLES CYSTO: Brand: MEDLINE INDUSTRIES, INC.

## (undated) DEVICE — TUBING, SUCTION, 9/32" X 20', STRAIGHT: Brand: MEDLINE INDUSTRIES, INC.

## (undated) DEVICE — LIGASURE IMPACT FT10 COMPATIBLE: Brand: MEDLINE

## (undated) DEVICE — ENDOSCOPIC KIT 1.1+ 10 FT OP4 CA DE

## (undated) DEVICE — BITEBLOCK 54FR W/ DENT RIM BLOX

## (undated) DEVICE — SPONGE GZ W4XL4IN RAYON POLY CVR W/NONWOVEN FAB STRL 2/PK

## (undated) DEVICE — STRAP,POSITIONING,KNEE/BODY,FOAM,4X60": Brand: MEDLINE

## (undated) DEVICE — SUTURE PROL SZ 1 L30IN NONABSORBABLE BLU L36MM CT-1 1/2 CIR 8425H

## (undated) DEVICE — BLANKET WRM W40.2XL55.9IN IORT LO BODY + MISTRAL AIR

## (undated) DEVICE — FORCEPS BX L240CM WRK CHN 2.8MM STD CAP W/ NDL MIC MESH

## (undated) DEVICE — SNAP KAP: Brand: UNBRANDED

## (undated) DEVICE — SOLUTION IRRIGATION STRL H2O 1000 ML UROMATIC CONTAINER

## (undated) DEVICE — TOWEL,OR,DSP,ST,BLUE,STD,4/PK,20PK/CS: Brand: MEDLINE

## (undated) DEVICE — GLOVE SURG SZ 75 CRM LTX FREE POLYISOPRENE POLYMER BEAD ANTI

## (undated) DEVICE — 450 ML BOTTLE OF 0.05% CHLORHEXIDINE GLUCONATE IN 99.95% STERILE WATER FOR IRRIGATION, USP AND APPLICATOR.: Brand: IRRISEPT ANTIMICROBIAL WOUND LAVAGE

## (undated) DEVICE — SINGLE USE AIR/WATER, SUCTION AND BIOPSY VALVES SET: Brand: ORCAPOD™

## (undated) DEVICE — STAPLER INT L60MM REG TISS BLU B FRM 8 FIRING 2 ROW AUTO

## (undated) DEVICE — FIAPC® PROBE W/ FILTER 2200 C OD 2.3MM/6.9FR; L 2.2M/7.2FT: Brand: ERBE

## (undated) DEVICE — BANDAGE ADH W1XL3IN UNIV PLAS NAT GEN USE STRP

## (undated) DEVICE — PACK SURG ABD SVMMC

## (undated) DEVICE — GAUZE,PACKING STRIP,IODOFORM,1/2"X5YD,ST: Brand: CURAD

## (undated) DEVICE — SUTURE VICRYL SZ 0 L36IN ABSRB UD CT-1 L36MM 1/2 CIR TAPR PNT VCP946H

## (undated) DEVICE — KIT DRN FLAT W/ 100CC EVAC 10MM FULL PERF

## (undated) DEVICE — CUFF REPROCESS BP TOURN SING BLDR 2 PORT 4X18IN

## (undated) DEVICE — PAD,ABDOMINAL,8"X7.5",ST,LF,20/BX: Brand: MEDLINE INDUSTRIES, INC.

## (undated) DEVICE — GLOVE SURG SZ 8 L12IN FNGR THK13MIL BRN LTX SYN POLYMER W

## (undated) DEVICE — BONE TAMP KIT KPX203NB AF X2 20/3

## (undated) DEVICE — GLOVE ORTHO 7 1/2   MSG9475

## (undated) DEVICE — SUTURE PERMAHAND SZ 0 L18IN NONABSORBABLE BLK SILK BRAID A186H

## (undated) DEVICE — TOWEL,OR,DSP,ST,BLUE,DLX,XR,4/PK,20PK/CS: Brand: MEDLINE

## (undated) DEVICE — GAUZE,SPONGE,FLUFF,6"X6.75",STRL,5/TRAY: Brand: MEDLINE

## (undated) DEVICE — Z DISCONTINUED APPLICATOR SURG PREP 0.35OZ 2% CHG 70% ISO ALC W/ HI LT

## (undated) DEVICE — NEEDLE SCLERO 25GA L240CM OD0.51MM ID0.24MM EXTN L4MM SHTH

## (undated) DEVICE — GOWN,AURORA,NONREINFORCED,LARGE: Brand: MEDLINE

## (undated) DEVICE — LIGASURE REPROCESS IMPACT

## (undated) DEVICE — MIXER A07A CEMENT

## (undated) DEVICE — JACKSON / MODULAR SPINAL TABLE STYLE POSITIONING KIT - STANDARD: Brand: SOULE MEDICAL

## (undated) DEVICE — PAD,NON-ADHERENT,3X8,STERILE,LF,1/PK: Brand: MEDLINE

## (undated) DEVICE — 1010 S-DRAPE TOWEL DRAPE 10/BX: Brand: STERI-DRAPE™

## (undated) DEVICE — Device: Brand: SPOT EX ENDOSCOPIC TATTOO

## (undated) DEVICE — BONE TAMP KIT KPX203PB FF X2 20/3 1 STP: Brand: KYPHOPAK™ TRAY

## (undated) DEVICE — DRESSING,GAUZE,XEROFORM,CURAD,1"X8",ST: Brand: CURAD

## (undated) DEVICE — SUTURE ETHLN SZ 3-0 L18IN NONABSORBABLE BLK FS-1 L24MM 3/8 663H

## (undated) DEVICE — COVER LT HNDL BLU PLAS

## (undated) DEVICE — RELOAD STPL L75MM OPN H3.8MM CLS 1.5MM WIRE DIA0.2MM REG

## (undated) DEVICE — SUTURE PROL SZ 4-0 L30IN NONABSORBABLE BLU SH L26MM 1/2 CIR 8831H

## (undated) DEVICE — NO USE 18 MONTHS GOWN STD LG  1153D

## (undated) DEVICE — GLOVE ORANGE PI 7 1/2   MSG9075

## (undated) DEVICE — SOLUTION IV 1000ML LAC RINGERS PH 6.5 INJ USP VIAFLX PLAS

## (undated) DEVICE — ERBE NESSY® OMEGA PLATE USA (85+23)CM² , WITH CABLE 3 M: Brand: ERBE

## (undated) DEVICE — STERILE HOOK LOCK LATEX FREE ELASTIC BANDAGE 2INX5YD: Brand: HOOK LOCK™

## (undated) DEVICE — CLIP LIG L235CM RESOL 360 BX/20

## (undated) DEVICE — SINGLE PORT MANIFOLD: Brand: NEPTUNE 2

## (undated) DEVICE — GLOVE SURG SZ 75 L12IN FNGR THK87MIL WHT LTX FREE

## (undated) DEVICE — BLADE,CARBON-STEEL,10,STRL,DISPOSABLE,TB: Brand: MEDLINE

## (undated) DEVICE — GLOVE SURG SZ 85 STD WHT LTX SYN POLYMER BEAD REINF ANTI RL

## (undated) DEVICE — SOLUTION IRRIG 1000ML 0.9% SOD CHL USP POUR PLAS BTL

## (undated) DEVICE — BANDAGE ADH W0.75XL3IN NAT PLAS CURAD

## (undated) DEVICE — COUNTER NDL 10 COUNT HLD 20 FOAM BLK SGL MAG

## (undated) DEVICE — BONE TAMP KIT KEX152EB FF E2 15/2 OI: Brand: KYPHON EXPRESS II KYPHOPAK TRAY

## (undated) DEVICE — DRAPE,MEDI-SLUSH,STERILE: Brand: MEDLINE

## (undated) DEVICE — SUTURE PROL SZ 1 L60IN NONABSORBABLE BLU L65MM TP-1 3/8 CIR 8824G

## (undated) DEVICE — 1200CC GUARDIAN II: Brand: GUARDIAN

## (undated) DEVICE — Device

## (undated) DEVICE — PADDING CAST W2INXL4YD ST COT COHESIVE HND TEARABLE SPEC

## (undated) DEVICE — SUTURE PROL SZ 2-0 L30IN NONABSORBABLE BLU L26MM CT-2 1/2 8411H

## (undated) DEVICE — SINGLE-USE BIOPSY FORCEPS: Brand: RADIAL JAW 4

## (undated) DEVICE — EXTENSION SET IV 12 IN 0.45 CC INFUSION MINIBOR TBNG CLMP

## (undated) DEVICE — AIRLIFE™ NASAL OXYGEN CANNULA CURVED, FLARED TIP, WITH 7 FEET (2.1 M) CRUSH RESISTANT TUBING, OVER-THE-EAR STYLE: Brand: AIRLIFE™

## (undated) DEVICE — Device: Brand: SENSURA MIO

## (undated) DEVICE — GAUZE,SPONGE,4"X4",16PLY,STRL,LF,10/TRAY: Brand: MEDLINE

## (undated) DEVICE — BLANKET WRM W29.9XL79.1IN UP BODY FORC AIR MISTRAL-AIR

## (undated) DEVICE — GARMENT,MEDLINE,DVT,INT,CALF,MED, GEN2: Brand: MEDLINE

## (undated) DEVICE — APPLICATOR MEDICATED 26 CC SOLUTION HI LT ORNG CHLORAPREP

## (undated) DEVICE — SOLUTION IRRIG 1000ML STRL H2O USP PLAS POUR BTL

## (undated) DEVICE — ELECTRODE PT RET AD L9FT HI MOIST COND ADH HYDRGEL CORDED

## (undated) DEVICE — SHEET,DRAPE,53X77,STERILE: Brand: MEDLINE

## (undated) DEVICE — MIC* SAFETY PERCUTANEOUS ENDOSCOPIC GASTROSTOMY PEG KIT - 20 FR - PULL: Brand: MIC PEG TUBE

## (undated) DEVICE — LIGATOR ENDOSCP L2.8MM DIA8.6-11.5MM MULT BND SPEEDBAND

## (undated) DEVICE — SUTURE PDS II SZ 0 L60IN ABSRB VLT L65MM TP-1 1/2 CIR Z991G

## (undated) DEVICE — NEEDLE, QUINCKE, 25GX2.5": Brand: MEDLINE

## (undated) DEVICE — ZINACTIVE USE 2539609 APPLICATOR MEDICATED 10.5 CC SOLUTION HI LT ORNG CHLORAPREP

## (undated) DEVICE — NEEDLE SPNL L4.5IN OD22GA QNCKE TYP SPINOCAN

## (undated) DEVICE — SHEET, T, LAPAROTOMY, STERILE: Brand: MEDLINE

## (undated) DEVICE — GOWN,POLY REINFORCED,LG: Brand: MEDLINE

## (undated) DEVICE — DUP USE 393023 JELLY LUBRICATING EZ 2OZ

## (undated) DEVICE — CLEANSER WND CLN DEB SYS IRRISEPT

## (undated) DEVICE — SEALER ENDOSCP NANO COAT OPN DIV CRV L JAW LIGASURE IMPACT

## (undated) DEVICE — ADAPTER CLEANING PORPOISE CLEANING

## (undated) DEVICE — SINGLE SHOT EPIDURAL TRAY: Brand: MEDLINE INDUSTRIES, INC.

## (undated) DEVICE — LIGATOR ENDOSCP DIA8.6-11.5MM MULT DISP SPDBND LIGATOR SUP

## (undated) DEVICE — AMBU AURASTRAIGHT U SIZE 4: Brand: AURASTRAIGHT

## (undated) DEVICE — SYRINGE A08E KIS INFLATION HP: Brand: KYPHON®  INFLATION SYRINGE

## (undated) DEVICE — NEEDLE SPNL 25GA L4 11/16IN BLU HUB DISP

## (undated) DEVICE — SUTURE VCRL + SZ 2-0 L54IN ABSRB VLT TIE POLYGLACTIN 910 VCP286G

## (undated) DEVICE — PREMIUM DRY TRAY LF: Brand: MEDLINE INDUSTRIES, INC.

## (undated) DEVICE — DEVICE INFLATION W/ SYR KYPHON

## (undated) DEVICE — SUTURE VCRL SZ 3-0 L27IN ABSRB UD L26MM SH 1/2 CIR J416H

## (undated) DEVICE — SYRINGE MED 20ML STD CLR PLAS LUERLOCK TIP N CTRL DISP

## (undated) DEVICE — DRESSING TRNSPAR W5XL4.5IN FLM SHT SEMIPERMEABLE WIND

## (undated) DEVICE — STAPLER INT L75MM CUT LN L73MM STPL LN L77MM BLU B FRM 8

## (undated) DEVICE — SOLUTION IV IRRIG WATER 1000ML POUR BRL 2F7114

## (undated) DEVICE — SPONGE DRN W4XL4IN RAYON/POLYESTER 6 PLY NONWOVEN PRECUT 2 PER PK

## (undated) DEVICE — MEDI-VAC YANKAUER SUCTION HANDLE W/BULBOUS TIP: Brand: CARDINAL HEALTH

## (undated) DEVICE — GOWN,AURORA,NONRNF,XL,30/CS: Brand: MEDLINE

## (undated) DEVICE — DRAPE, SLUSH XL, 44X66, STERILE: Brand: MEDLINE

## (undated) DEVICE — BONE TAMP KE152 15/2 EXPRESS II IBT: Brand: KYPHON® EXPRESS™ INFLATABLE BONE TAMP

## (undated) DEVICE — Device: Brand: DEFENDO VALVE AND CONNECTOR KIT

## (undated) DEVICE — 1LYRTR 16FR10ML100%SIL UMS SNP: Brand: MEDLINE INDUSTRIES, INC.

## (undated) DEVICE — ST CHARLES MAJOR ABDOMINAL: Brand: MEDLINE INDUSTRIES, INC.

## (undated) DEVICE — MULTIPLE BAND LIGATOR: Brand: SPEEDBAND SUPERVIEW SUPER 7

## (undated) DEVICE — PERRYSBURG ENDO PACK: Brand: MEDLINE INDUSTRIES, INC.

## (undated) DEVICE — VALVE SUCTION AIR H2O SET ORCA POD + DISP

## (undated) DEVICE — SPONGE GZ 16 PLY WVN COT 4INX4IN STERIL

## (undated) DEVICE — BINDER ABD UNISX 9IN 62IN L AND XL UNIV